# Patient Record
Sex: MALE | Race: BLACK OR AFRICAN AMERICAN | NOT HISPANIC OR LATINO | Employment: OTHER | URBAN - METROPOLITAN AREA
[De-identification: names, ages, dates, MRNs, and addresses within clinical notes are randomized per-mention and may not be internally consistent; named-entity substitution may affect disease eponyms.]

---

## 2017-01-24 DIAGNOSIS — E11.00 UNCONTROLLED TYPE 2 DIABETES MELLITUS WITH HYPEROSMOLARITY WITHOUT COMA, WITHOUT LONG-TERM CURRENT USE OF INSULIN: ICD-10-CM

## 2017-02-14 ENCOUNTER — TELEPHONE (OUTPATIENT)
Dept: WOUND CARE | Facility: CLINIC | Age: 67
End: 2017-02-14

## 2017-02-14 NOTE — TELEPHONE ENCOUNTER
----- Message from Silvia Melendrez sent at 2/14/2017 12:21 PM CST -----  Contact: self  Pt states he has blisters forming around ankle where left foot was amputated. Please call Sajivirgilio garcia @ [phone].Thank you.

## 2017-02-17 ENCOUNTER — OFFICE VISIT (OUTPATIENT)
Dept: WOUND CARE | Facility: CLINIC | Age: 67
End: 2017-02-17
Payer: MEDICARE

## 2017-02-17 VITALS
DIASTOLIC BLOOD PRESSURE: 63 MMHG | TEMPERATURE: 98 F | WEIGHT: 261.81 LBS | SYSTOLIC BLOOD PRESSURE: 142 MMHG | HEIGHT: 69 IN | HEART RATE: 80 BPM | BODY MASS INDEX: 38.78 KG/M2

## 2017-02-17 DIAGNOSIS — S91.302A WOUND, OPEN, FOOT WITH COMPLICATION, LEFT, INITIAL ENCOUNTER: ICD-10-CM

## 2017-02-17 DIAGNOSIS — E11.42 DIABETIC POLYNEUROPATHY ASSOCIATED WITH TYPE 2 DIABETES MELLITUS: ICD-10-CM

## 2017-02-17 DIAGNOSIS — B35.3 TINEA PEDIS OF BOTH FEET: ICD-10-CM

## 2017-02-17 DIAGNOSIS — L97.321 ULCER OF ANKLE, LEFT, LIMITED TO BREAKDOWN OF SKIN: ICD-10-CM

## 2017-02-17 PROBLEM — L97.309 ULCER OF ANKLE: Status: ACTIVE | Noted: 2017-02-17

## 2017-02-17 PROCEDURE — 4010F ACE/ARB THERAPY RXD/TAKEN: CPT | Mod: S$GLB,,, | Performed by: NURSE PRACTITIONER

## 2017-02-17 PROCEDURE — 1159F MED LIST DOCD IN RCRD: CPT | Mod: S$GLB,,, | Performed by: NURSE PRACTITIONER

## 2017-02-17 PROCEDURE — 3078F DIAST BP <80 MM HG: CPT | Mod: S$GLB,,, | Performed by: NURSE PRACTITIONER

## 2017-02-17 PROCEDURE — 99999 PR PBB SHADOW E&M-EST. PATIENT-LVL V: CPT | Mod: PBBFAC,,, | Performed by: NURSE PRACTITIONER

## 2017-02-17 PROCEDURE — 2022F DILAT RTA XM EVC RTNOPTHY: CPT | Mod: S$GLB,,, | Performed by: NURSE PRACTITIONER

## 2017-02-17 PROCEDURE — 99212 OFFICE O/P EST SF 10 MIN: CPT | Mod: 25,S$GLB,, | Performed by: NURSE PRACTITIONER

## 2017-02-17 PROCEDURE — 1125F AMNT PAIN NOTED PAIN PRSNT: CPT | Mod: S$GLB,,, | Performed by: NURSE PRACTITIONER

## 2017-02-17 PROCEDURE — 3044F HG A1C LEVEL LT 7.0%: CPT | Mod: S$GLB,,, | Performed by: NURSE PRACTITIONER

## 2017-02-17 PROCEDURE — 1160F RVW MEDS BY RX/DR IN RCRD: CPT | Mod: S$GLB,,, | Performed by: NURSE PRACTITIONER

## 2017-02-17 PROCEDURE — 11042 DBRDMT SUBQ TIS 1ST 20SQCM/<: CPT | Mod: LT,S$GLB,, | Performed by: NURSE PRACTITIONER

## 2017-02-17 PROCEDURE — 3077F SYST BP >= 140 MM HG: CPT | Mod: S$GLB,,, | Performed by: NURSE PRACTITIONER

## 2017-02-17 PROCEDURE — 1157F ADVNC CARE PLAN IN RCRD: CPT | Mod: S$GLB,,, | Performed by: NURSE PRACTITIONER

## 2017-02-17 NOTE — PATIENT INSTRUCTIONS
"Cleanse wounds with wound .  Triamcinolone cream to left lower leg twice daily.  Econazole cream to feet daily.  Apply hydrofera blue ready to left foot and ankle wounds, cover with gauze and secure with roll gauze.  Compression with one 4" and one 6" ace wrap left lower leg.  "

## 2017-02-17 NOTE — MR AVS SNAPSHOT
[facility]  [address]  [facility] [address]  Phone: [phone]                  Saji Castañeda   2017 10:20 AM   Office Visit    Description:  Male : 1950   Provider:  Priya Uribe NP   Department:  [facility]           Reason for Visit     Wound Check           Diagnoses this Visit        Comments    Uncontrolled type 2 diabetes mellitus with complication, with long-term current use of insulin    -  Primary     Wound, open, foot with complication, left, initial encounter         Ulcer of ankle, left, limited to breakdown of skin         Diabetic polyneuropathy associated with type 2 diabetes mellitus         Tinea pedis of both feet                To Do List           Goals (5 Years of Data)     None      Follow-Up and Disposition     Return in about 2 weeks (around 3/3/2017) for Wound evaluation.      [facility] On Call     [facility] On Call Nurse Care Line -  Assistance  Registered nurses in the [facility] On Call Center provide clinical advisement, health education, appointment booking, and other advisory services.  Call for this free service at [phone].             Medications           Message regarding Medications     Verify the changes and/or additions to your medication regime listed below are the same as discussed with your clinician today.  If any of these changes or additions are incorrect, please notify your healthcare provider.             Verify that the below list of medications is an accurate representation of the medications you are currently taking.  If none reported, the list may be blank. If incorrect, please contact your healthcare provider. Carry this list with you in case of emergency.           Current Medications     ammonium lactate (LAC-HYDRIN) 12 % lotion     aspirin (ECOTRIN) 81 MG EC tablet Take 81 mg by mouth once daily.    atorvastatin (LIPITOR) 20 MG tablet TK 1 T PO QD    atorvastatin (LIPITOR) 40 MG tablet Take 40 mg by  "mouth every evening.     BESIVANCE 0.6 % DrpS INSTILL 1 DROP INTO THE OS TID    blood sugar diagnostic (CONTOUR TEST STRIPS) Strp Inject 1 strip into the skin 2 (two) times daily before meals.    blood sugar diagnostic Strp Accu-Chek Test Strips to be tested 2 times daily    DUREZOL 0.05 % Drop ophthalmic solution INSTILL 1 DROP INTO THE OS QID    econazole nitrate 1 % cream APPLY EXTERNALLY TO THE AFFECTED AREA ONCE DAILY    ILEVRO 0.3 % DrpS INSTILL 1 DROP INTO THE OS QD    insulin detemir (LEVEMIR FLEXTOUCH) 100 unit/mL (3 mL) SubQ InPn pen Inject 25 Units into the skin 2 (two) times daily.    insulin detemir (LEVEMIR FLEXTOUCH) 100 unit/mL (3 mL) SubQ InPn pen Inject 25 units into the skin twice daily    lisinopril 10 MG tablet Take 10 mg by mouth once daily.     losartan (COZAAR) 50 MG tablet     metformin (GLUCOPHAGE) 500 MG tablet Take 2 tablets (1,000 mg total) by mouth 2 (two) times daily with meals.    MICROLET LANCET Misc     pen needle, diabetic (BD ULTRA-FINE ADITYA PEN NEEDLES) 32 gauge x 5/32" Ndle 4 Act by Misc.(Non-Drug; Combo Route) route 4 (four) times daily with meals and nightly.    sitagliptin (JANUVIA) 100 MG Tab Take 1 tablet (100 mg total) by mouth once daily.    tamsulosin (FLOMAX) 0.4 mg Cp24 Take 0.4 mg by mouth once daily.    triamcinolone acetonide 0.1% (KENALOG) 0.1 % cream APPLY EXTERNALLY TO THE AFFECTED AREA TWICE DAILY AS DIRECTED           Clinical Reference Information           Your Vitals Were     Height Weight BMI          5' 9" (1.753 m) 118.8 kg (261 lb 12.8 oz) 38.66 kg/m2        Allergies as of 2/17/2017     No Known Allergies      Immunizations Administered on Date of Encounter - 2/17/2017     None      [facility] Sign-Up     Activating your [facility] account is as easy as 1-2-3!     1) Visit [email or web address], select Sign Up Now, enter this activation code and your date of birth, then select Next.  [email or web address]  Expires: 4/3/2017 10:56 AM      2) Create a username and " "password to use when you visit [facility] in the future and select a security question in case you lose your password and select Next.    3) Enter your e-mail address and click Sign Up!    Additional Information  If you have questions, please e-mail [email or web address] or call [phone] to talk to our [facility] staff. Remember, [facility] is NOT to be used for urgent needs. For medical emergencies, dial 911.         Instructions    Cleanse wounds with wound .  Triamcinolone cream to left lower leg twice daily.  Econazole cream to feet daily.  Apply hydrofera blue ready to left foot and ankle wounds, cover with gauze and secure with roll gauze.  Compression with one 4" and one 6" ace wrap left lower leg.       Language Assistance Services     ATTENTION: Language assistance services are available, free of charge. Please call [phone].      ATENCIÓN: Si scott iniguez, tiene a glover disposición servicios gratuitos de asistencia lingüística. Llame al [phone].     VAL Ý: N?u b?n nói Ti?ng Vi?t, có các d?ch v? h? tr? ngôn ng? mi?n phí dành cho b?n. G?i s? [phone].         [facility] complies with applicable Federal civil rights laws and does not discriminate on the basis of race, color, national origin, age, disability, or sex.        "

## 2017-02-17 NOTE — PROGRESS NOTES
Subjective:       Patient ID: Saji Castañeda is a 67 y.o. male.    Chief Complaint: Wound Check    Wound Check     This patient is well-known to my service.  He is seen today with complaint of blisters to the medial and lateral aspect of the left lower leg.  This was first noticed on 2/8/17.  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  He was given orthotic shoes to wear because of the anatomy of the left leg.  However, he was wearing a pair of tennis shoes instead because he said they were more comfortable.  Because they were ill-fitting, they rubbed the medial and lateral aspect of the left ankle causing blisters.  In addition he has not been using his leg and foot creams routinely as prescribed.  As a result he has a thick callous over the previously healed surgical site that now appears wet. He is afebrile.  He denies increased redness, swelling or purulent drainage.  His pain level is 8/10.  His wound healing has been complicated by his type II diabetes which has been poorly controlled and by leg edema.  He has been keeping the wounds clean and dry but not using anything topically.       Review of Systems   Constitutional: Negative for chills, diaphoresis and fever.   HENT: Negative for hearing loss, postnasal drip, rhinorrhea, sinus pressure, sneezing, sore throat, tinnitus and trouble swallowing.    Eyes: Positive for visual disturbance (blurred vision).   Respiratory: Negative for cough, shortness of breath and wheezing.    Cardiovascular: Positive for leg swelling. Negative for chest pain and palpitations.   Gastrointestinal: Negative for constipation, diarrhea, nausea and vomiting.   Genitourinary: Negative for difficulty urinating, dysuria and hematuria.   Musculoskeletal: Positive for joint swelling (knees). Negative for arthralgias, back pain and myalgias.   Skin: Negative for wound.   Neurological: Negative for dizziness, light-headedness and headaches.   Hematological:  Does not bruise/bleed easily.   Psychiatric/Behavioral: Negative for decreased concentration and sleep disturbance. The patient is not nervous/anxious.        Objective:      Physical Exam   Constitutional: He is oriented to person, place, and time. He appears well-developed and well-nourished. No distress.   HENT:   Head: Normocephalic and atraumatic.   Neck: Normal range of motion.   Pulmonary/Chest: Effort normal.   Musculoskeletal: Normal range of motion. He exhibits edema. He exhibits no tenderness.        Feet:    Neurological: He is alert and oriented to person, place, and time.   Skin: Skin is warm, dry and intact. No rash noted. He is not diaphoretic. No cyanosis or erythema. Nails show no clubbing.   Psychiatric: He has a normal mood and affect. His behavior is normal. Judgment and thought content normal.   Nursing note and vitals reviewed.        Hemoglobin A1C   Date Value Ref Range Status   10/25/2016 6.9 (H) 4.5 - 6.2 % Final     Comment:     According to ADA guidelines, hemoglobin A1C <7.0% represents  optimal control in non-pregnant diabetic patients.  Different  metrics may apply to specific populations.   Standards of Medical Care in Diabetes - 2016.  For the purpose of screening for the presence of diabetes:  <5.7%     Consistent with the absence of diabetes  5.7-6.4%  Consistent with increasing risk for diabetes   (prediabetes)  >or=6.5%  Consistent with diabetes  Currently no consensus exists for use of hemoglobin A1C  for diagnosis of diabetes for children.     07/14/2016 7.8 (H) 4.5 - 6.2 % Final     Comment:     According to ADA guidelines, hemoglobin A1C <7.0% represents  optimal control in non-pregnant diabetic patients.  Different  metrics may apply to specific populations.   Standards of Medical Care in Diabetes - 2016.  For the purpose of screening for the presence of diabetes:  <5.7%     Consistent with the absence of diabetes  5.7-6.4%  Consistent with increasing risk for diabetes  "  (prediabetes)  >or=6.5%  Consistent with diabetes  Currently no consensus exists for use of hemoglobin A1C  for diagnosis of diabetes for children.     04/06/2016 10.5 (H) 4.5 - 6.2 % Final     Procedure: Saji was seen in the clinic room and placed in the supine position on the treatment table. Attention was directed to the wound which was located on the left foot, where an wound measuring 1.7x2.1 cm is noted. The wound was covered with 100% fibrin and a thick callused rim. No acute signs of infection were noted. The wound was non-tender. The wound was prepped with alcohol. Utilizing a #15 scalpel and pickups, I debrided the wound full-thickness through skin and subcutaneous tissue to excise viable and nonviable tissue inside and outside the wound margins. The patient tolerated well. Because of a lack of sensation, anesthesia was not necessary. The wound was flushed with wound . There was minimal bleeding with debridement which was well controlled with direct pressure. The wound was then dressed with hydrofera blue ready. The patient tolerated the procedure well.  Assessment:       1. Uncontrolled type 2 diabetes mellitus with complication, with long-term current use of insulin    2. Wound, open, foot with complication, left, initial encounter    3. Ulcer of ankle, left, limited to breakdown of skin    4. Diabetic polyneuropathy associated with type 2 diabetes mellitus    5. Tinea pedis of both feet        Plan:           Cleanse wounds with wound .  Triamcinolone cream to left lower leg twice daily.  Econazole cream to feet daily.  Apply hydrofera blue ready to left foot and ankle wounds, cover with gauze and secure with roll gauze.  Compression with one 4" and one 6" ace wrap left lower leg.  Return to this clinic in 2 weeks.  Because this patient is homebound, he would qualify for home health care assistance and has had this in the past.  He will contact his PCP to see about ordering " this.    Left foot       Left lateral ankle      Left medial ankle

## 2017-02-24 RX ORDER — INSULIN DETEMIR 100 [IU]/ML
INJECTION, SOLUTION SUBCUTANEOUS
Qty: 15 ML | Refills: 4 | Status: SHIPPED | OUTPATIENT
Start: 2017-02-24 | End: 2017-07-15 | Stop reason: SDUPTHER

## 2017-03-09 ENCOUNTER — TELEPHONE (OUTPATIENT)
Dept: WOUND CARE | Facility: CLINIC | Age: 67
End: 2017-03-09

## 2017-03-09 ENCOUNTER — OFFICE VISIT (OUTPATIENT)
Dept: WOUND CARE | Facility: CLINIC | Age: 67
End: 2017-03-09
Payer: MEDICARE

## 2017-03-09 VITALS
DIASTOLIC BLOOD PRESSURE: 73 MMHG | HEIGHT: 69 IN | WEIGHT: 262.38 LBS | SYSTOLIC BLOOD PRESSURE: 130 MMHG | BODY MASS INDEX: 38.86 KG/M2 | TEMPERATURE: 99 F | HEART RATE: 74 BPM

## 2017-03-09 DIAGNOSIS — L97.321 ULCER OF ANKLE, LEFT, LIMITED TO BREAKDOWN OF SKIN: ICD-10-CM

## 2017-03-09 DIAGNOSIS — B35.3 TINEA PEDIS OF BOTH FEET: ICD-10-CM

## 2017-03-09 DIAGNOSIS — R60.0 BILATERAL EDEMA OF LOWER EXTREMITY: Chronic | ICD-10-CM

## 2017-03-09 DIAGNOSIS — S91.302A WOUND, OPEN, FOOT WITH COMPLICATION, LEFT, INITIAL ENCOUNTER: ICD-10-CM

## 2017-03-09 PROCEDURE — 99211 OFF/OP EST MAY X REQ PHY/QHP: CPT | Mod: S$GLB,,, | Performed by: NURSE PRACTITIONER

## 2017-03-09 PROCEDURE — 99999 PR PBB SHADOW E&M-EST. PATIENT-LVL V: CPT | Mod: PBBFAC,,, | Performed by: NURSE PRACTITIONER

## 2017-03-09 NOTE — MR AVS SNAPSHOT
[facility]  [address]  [facility] [address]  Phone: [phone]                  Saji Castañeda   3/9/2017 11:40 AM   Office Visit    Description:  Male : 1950   Provider:  Priya Uribe NP   Department:  [facility]           Reason for Visit     Wound Check           Diagnoses this Visit        Comments    Uncontrolled type 2 diabetes mellitus with complication, with long-term current use of insulin    -  Primary     Ulcer of ankle, left, limited to breakdown of skin         Wound, open, foot with complication, left, initial encounter         Tinea pedis of both feet         Bilateral edema of lower extremity                To Do List           Future Appointments        Provider Department Dept Phone    3/9/2017 11:40 AM SHAYNE Cm [facility] [phone]      Goals (5 Years of Data)     None      Follow-Up and Disposition     Return if symptoms worsen or fail to improve.      [facility] On Call     [facility] On Call Nurse Care Line -  Assistance  Registered nurses in the [facility] On Call Center provide clinical advisement, health education, appointment booking, and other advisory services.  Call for this free service at [phone].             Medications           Message regarding Medications     Verify the changes and/or additions to your medication regime listed below are the same as discussed with your clinician today.  If any of these changes or additions are incorrect, please notify your healthcare provider.             Verify that the below list of medications is an accurate representation of the medications you are currently taking.  If none reported, the list may be blank. If incorrect, please contact your healthcare provider. Carry this list with you in case of emergency.           Current Medications     ammonium lactate (LAC-HYDRIN) 12 % lotion     aspirin (ECOTRIN) 81 MG EC tablet Take 81 mg by mouth once daily.    atorvastatin  "(LIPITOR) 20 MG tablet TK 1 T PO QD    atorvastatin (LIPITOR) 40 MG tablet Take 40 mg by mouth every evening.     BESIVANCE 0.6 % DrpS INSTILL 1 DROP INTO THE OS TID    blood sugar diagnostic (CONTOUR TEST STRIPS) Strp Inject 1 strip into the skin 2 (two) times daily before meals.    blood sugar diagnostic Strp Accu-Chek Test Strips to be tested 2 times daily    DUREZOL 0.05 % Drop ophthalmic solution INSTILL 1 DROP INTO THE OS QID    econazole nitrate 1 % cream APPLY EXTERNALLY TO THE AFFECTED AREA ONCE DAILY    ILEVRO 0.3 % DrpS INSTILL 1 DROP INTO THE OS QD    insulin detemir (LEVEMIR FLEXTOUCH) 100 unit/mL (3 mL) SubQ InPn pen Inject 25 Units into the skin 2 (two) times daily.    LEVEMIR FLEXTOUCH 100 unit/mL (3 mL) InPn pen INJECT 25 UNITS SUBCUTANEOUS TWICE DAILY    lisinopril 10 MG tablet Take 10 mg by mouth once daily.     losartan (COZAAR) 50 MG tablet     metformin (GLUCOPHAGE) 500 MG tablet Take 2 tablets (1,000 mg total) by mouth 2 (two) times daily with meals.    MICROLET LANCET Misc     pen needle, diabetic (BD ULTRA-FINE ADITYA PEN NEEDLES) 32 gauge x 5/32" Ndle 4 Act by Misc.(Non-Drug; Combo Route) route 4 (four) times daily with meals and nightly.    sitagliptin (JANUVIA) 100 MG Tab Take 1 tablet (100 mg total) by mouth once daily.    tamsulosin (FLOMAX) 0.4 mg Cp24 Take 0.4 mg by mouth once daily.    triamcinolone acetonide 0.1% (KENALOG) 0.1 % cream APPLY EXTERNALLY TO THE AFFECTED AREA TWICE DAILY AS DIRECTED           Clinical Reference Information           Your Vitals Were     Height Weight BMI          5' 9" (1.753 m) 119 kg (262 lb 6.4 oz) 38.75 kg/m2        Allergies as of 3/9/2017     No Known Allergies      Immunizations Administered on Date of Encounter - 3/9/2017     None      [facility] Sign-Up     Activating your [facility] account is as easy as 1-2-3!     1) Visit [email or web address], select Sign Up Now, enter this activation code and your date of birth, then select " "Next.  [email or web address]  Expires: 4/3/2017 10:56 AM      2) Create a username and password to use when you visit [facility] in the future and select a security question in case you lose your password and select Next.    3) Enter your e-mail address and click Sign Up!    Additional Information  If you have questions, please e-mail [email or web address] or call [phone] to talk to our [facility] staff. Remember, [facility] is NOT to be used for urgent needs. For medical emergencies, dial 911.         Instructions    Spectazole cream to feet daily.  Triamcinolone cream to lower leg twice daily.  Compression with one 4" and one 6" ace wrap left lower leg.    Go to [employer] or [employer] to get new footwear.       Language Assistance Services     ATTENTION: Language assistance services are available, free of charge. Please call [phone].      ATENCIÓN: Si scott iniguez, tiene a glover disposición servicios gratuitos de asistencia lingüística. Llame al [phone].     VAL Ý: N?u b?n nói Ti?ng Vi?t, có các d?ch v? h? tr? ngôn ng? mi?n phí dành cho b?n. G?i s? [phone].         [facility] complies with applicable Federal civil rights laws and does not discriminate on the basis of race, color, national origin, age, disability, or sex.        "

## 2017-03-09 NOTE — PATIENT INSTRUCTIONS
"Spectazole cream to feet daily.  Triamcinolone cream to lower leg twice daily.  Compression with one 4" and one 6" ace wrap left lower leg.    Go to [facility] or [employer] to get new footwear.  "

## 2017-03-09 NOTE — TELEPHONE ENCOUNTER
----- Message from Vickie Garg LPN sent at 3/9/2017  2:18 PM CST -----      ----- Message -----     From: Dimitri Marx     Sent: 3/9/2017   2:15 PM       To: [facility] Staff    Patient would like to give the office the number for the home health nurse that comes to his home;Pt doesn't remember her name but the # [phone]//please call back at [phone]//thank you

## 2017-03-09 NOTE — PROGRESS NOTES
Subjective:       Patient ID: Saji Castañeda is a 67 y.o. male.    Chief Complaint: Wound Check    Wound Check     This patient is seen today for reevaluation of blisters to the medial and lateral aspect of the left lower leg.  This was first noticed on 2/8/17.  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  He was given orthotic shoes to wear because of the anatomy of the left leg.  However, he was wearing a pair of tennis shoes instead because he said they were more comfortable.  Because they were ill-fitting, they rubbed the medial and lateral aspect of the left ankle causing blisters.  Hydrofera blue is being used on the wounds and they are now healed. He is afebrile.  He denies increased redness, swelling or purulent drainage.  He does not have any pain.  His medical history is significant for type II diabetes which has been poorly controlled and by leg edema.         Review of Systems   Constitutional: Negative for chills, diaphoresis and fever.   HENT: Negative for hearing loss, postnasal drip, rhinorrhea, sinus pressure, sneezing, sore throat, tinnitus and trouble swallowing.    Eyes: Positive for visual disturbance (blurred vision).   Respiratory: Negative for cough, shortness of breath and wheezing.    Cardiovascular: Positive for leg swelling. Negative for chest pain and palpitations.   Gastrointestinal: Negative for constipation, diarrhea, nausea and vomiting.   Genitourinary: Negative for difficulty urinating, dysuria and hematuria.   Musculoskeletal: Positive for joint swelling (knees). Negative for arthralgias, back pain and myalgias.   Skin: Negative for wound.   Neurological: Negative for dizziness, light-headedness and headaches.   Hematological: Does not bruise/bleed easily.   Psychiatric/Behavioral: Negative for decreased concentration and sleep disturbance. The patient is not nervous/anxious.        Objective:      Physical Exam   Constitutional: He is oriented to  person, place, and time. He appears well-developed and well-nourished. No distress.   HENT:   Head: Normocephalic and atraumatic.   Neck: Normal range of motion.   Pulmonary/Chest: Effort normal.   Musculoskeletal: Normal range of motion. He exhibits edema. He exhibits no tenderness.        Feet:    Neurological: He is alert and oriented to person, place, and time.   Skin: Skin is warm, dry and intact. No rash noted. He is not diaphoretic. No cyanosis or erythema. Nails show no clubbing.   Psychiatric: He has a normal mood and affect. His behavior is normal. Judgment and thought content normal.   Nursing note and vitals reviewed.        Hemoglobin A1C   Date Value Ref Range Status   10/25/2016 6.9 (H) 4.5 - 6.2 % Final     Comment:     According to ADA guidelines, hemoglobin A1C <7.0% represents  optimal control in non-pregnant diabetic patients.  Different  metrics may apply to specific populations.   Standards of Medical Care in Diabetes - 2016.  For the purpose of screening for the presence of diabetes:  <5.7%     Consistent with the absence of diabetes  5.7-6.4%  Consistent with increasing risk for diabetes   (prediabetes)  >or=6.5%  Consistent with diabetes  Currently no consensus exists for use of hemoglobin A1C  for diagnosis of diabetes for children.     07/14/2016 7.8 (H) 4.5 - 6.2 % Final     Comment:     According to ADA guidelines, hemoglobin A1C <7.0% represents  optimal control in non-pregnant diabetic patients.  Different  metrics may apply to specific populations.   Standards of Medical Care in Diabetes - 2016.  For the purpose of screening for the presence of diabetes:  <5.7%     Consistent with the absence of diabetes  5.7-6.4%  Consistent with increasing risk for diabetes   (prediabetes)  >or=6.5%  Consistent with diabetes  Currently no consensus exists for use of hemoglobin A1C  for diagnosis of diabetes for children.     04/06/2016 10.5 (H) 4.5 - 6.2 % Final     Procedure: Saji was seen in the  "clinic room and placed in the supine position on the treatment table. Attention was directed to the wound which was located on the left foot, where an wound measuring 1.7x2.1 cm is noted. The wound was covered with 100% fibrin and a thick callused rim. No acute signs of infection were noted. The wound was non-tender. The wound was prepped with alcohol. Utilizing a #15 scalpel and pickups, I debrided the wound full-thickness through skin and subcutaneous tissue to excise viable and nonviable tissue inside and outside the wound margins. The patient tolerated well. Because of a lack of sensation, anesthesia was not necessary. The wound was flushed with wound . There was minimal bleeding with debridement which was well controlled with direct pressure. The wound was then dressed with hydrofera blue ready. The patient tolerated the procedure well.  Assessment:       1. Uncontrolled type 2 diabetes mellitus with complication, with long-term current use of insulin    2. Ulcer of ankle, left, limited to breakdown of skin    3. Wound, open, foot with complication, left, initial encounter    4. Tinea pedis of both feet    5. Bilateral edema of lower extremity        Plan:           Spectazole cream to feet daily.  Triamcinolone cream to lower leg twice daily.  Compression with one 4" and one 6" ace wrap left lower leg.  Return to this clinic as needed.      Left foot       Left lateral ankle      Left medial ankle                                                                                                                                                                                                                          "

## 2017-03-28 RX ORDER — SITAGLIPTIN 100 MG/1
TABLET, FILM COATED ORAL
Qty: 90 TABLET | Refills: 1 | Status: ON HOLD | OUTPATIENT
Start: 2017-03-28 | End: 2020-12-14

## 2017-04-13 RX ORDER — PEN NEEDLE, DIABETIC 31 GX5/16"
NEEDLE, DISPOSABLE MISCELLANEOUS
Qty: 100 EACH | Refills: 11 | Status: SHIPPED | OUTPATIENT
Start: 2017-04-13 | End: 2018-05-03 | Stop reason: SDUPTHER

## 2017-06-13 ENCOUNTER — ANESTHESIA (OUTPATIENT)
Dept: SURGERY | Facility: OTHER | Age: 67
End: 2017-06-13
Payer: MEDICARE

## 2017-06-13 ENCOUNTER — ANESTHESIA EVENT (OUTPATIENT)
Dept: SURGERY | Facility: OTHER | Age: 67
End: 2017-06-13
Payer: MEDICARE

## 2017-06-13 ENCOUNTER — SURGERY (OUTPATIENT)
Age: 67
End: 2017-06-13

## 2017-06-13 ENCOUNTER — HOSPITAL ENCOUNTER (OUTPATIENT)
Facility: OTHER | Age: 67
Discharge: HOME | End: 2017-06-13
Attending: OPHTHALMOLOGY | Admitting: OPHTHALMOLOGY
Payer: MEDICARE

## 2017-06-13 VITALS
HEART RATE: 60 BPM | OXYGEN SATURATION: 98 % | DIASTOLIC BLOOD PRESSURE: 66 MMHG | TEMPERATURE: 99 F | RESPIRATION RATE: 16 BRPM | SYSTOLIC BLOOD PRESSURE: 146 MMHG

## 2017-06-13 DIAGNOSIS — H25.019 CATARACT CORTICAL, SENILE, UNSPECIFIED LATERALITY: Primary | ICD-10-CM

## 2017-06-13 DIAGNOSIS — H25.012 CATARACT CORTICAL, SENILE, LEFT: ICD-10-CM

## 2017-06-13 LAB
POCT GLUCOSE: 74 MG/DL (ref 70–110)
POCT GLUCOSE: 76 MG/DL (ref 70–110)

## 2017-06-13 PROCEDURE — 36000707: Performed by: OPHTHALMOLOGY

## 2017-06-13 PROCEDURE — 37000009 HC ANESTHESIA EA ADD 15 MINS: Performed by: OPHTHALMOLOGY

## 2017-06-13 PROCEDURE — 25000003 PHARM REV CODE 250: Performed by: SPECIALIST

## 2017-06-13 PROCEDURE — 63600175 PHARM REV CODE 636 W HCPCS: Performed by: NURSE ANESTHETIST, CERTIFIED REGISTERED

## 2017-06-13 PROCEDURE — 25000003 PHARM REV CODE 250: Performed by: OPHTHALMOLOGY

## 2017-06-13 PROCEDURE — 37000008 HC ANESTHESIA 1ST 15 MINUTES: Performed by: OPHTHALMOLOGY

## 2017-06-13 PROCEDURE — 71000015 HC POSTOP RECOV 1ST HR: Performed by: OPHTHALMOLOGY

## 2017-06-13 PROCEDURE — 71000016 HC POSTOP RECOV ADDL HR: Performed by: OPHTHALMOLOGY

## 2017-06-13 PROCEDURE — V2632 POST CHMBR INTRAOCULAR LENS: HCPCS | Performed by: OPHTHALMOLOGY

## 2017-06-13 PROCEDURE — 36000706: Performed by: OPHTHALMOLOGY

## 2017-06-13 PROCEDURE — 25000003 PHARM REV CODE 250: Performed by: NURSE ANESTHETIST, CERTIFIED REGISTERED

## 2017-06-13 DEVICE — LENS 19.0 ACRYSOF: Type: IMPLANTABLE DEVICE | Site: EYE | Status: FUNCTIONAL

## 2017-06-13 RX ORDER — TROPICAMIDE 10 MG/ML
1 SOLUTION/ DROPS OPHTHALMIC EVERY 5 MIN PRN
Status: COMPLETED | OUTPATIENT
Start: 2017-06-13 | End: 2017-06-13

## 2017-06-13 RX ORDER — TETRACAINE HYDROCHLORIDE 5 MG/ML
SOLUTION OPHTHALMIC
Status: DISCONTINUED | OUTPATIENT
Start: 2017-06-13 | End: 2017-06-13 | Stop reason: HOSPADM

## 2017-06-13 RX ORDER — MIDAZOLAM HYDROCHLORIDE 1 MG/ML
INJECTION INTRAMUSCULAR; INTRAVENOUS
Status: DISCONTINUED | OUTPATIENT
Start: 2017-06-13 | End: 2017-06-13

## 2017-06-13 RX ORDER — HYDRALAZINE HYDROCHLORIDE 20 MG/ML
INJECTION INTRAMUSCULAR; INTRAVENOUS
Status: DISCONTINUED | OUTPATIENT
Start: 2017-06-13 | End: 2017-06-13

## 2017-06-13 RX ORDER — FENTANYL CITRATE 50 UG/ML
INJECTION, SOLUTION INTRAMUSCULAR; INTRAVENOUS
Status: DISCONTINUED | OUTPATIENT
Start: 2017-06-13 | End: 2017-06-13

## 2017-06-13 RX ORDER — SODIUM CHLORIDE, SODIUM LACTATE, POTASSIUM CHLORIDE, CALCIUM CHLORIDE 600; 310; 30; 20 MG/100ML; MG/100ML; MG/100ML; MG/100ML
INJECTION, SOLUTION INTRAVENOUS CONTINUOUS PRN
Status: DISCONTINUED | OUTPATIENT
Start: 2017-06-13 | End: 2017-06-13

## 2017-06-13 RX ORDER — LIDOCAINE HYDROCHLORIDE 40 MG/ML
INJECTION, SOLUTION RETROBULBAR
Status: DISCONTINUED | OUTPATIENT
Start: 2017-06-13 | End: 2017-06-13 | Stop reason: HOSPADM

## 2017-06-13 RX ORDER — PHENYLEPHRINE HYDROCHLORIDE 25 MG/ML
1 SOLUTION/ DROPS OPHTHALMIC EVERY 5 MIN PRN
Status: COMPLETED | OUTPATIENT
Start: 2017-06-13 | End: 2017-06-13

## 2017-06-13 RX ORDER — DEXTROSE MONOHYDRATE 50 MG/ML
INJECTION, SOLUTION INTRAVENOUS CONTINUOUS PRN
Status: DISCONTINUED | OUTPATIENT
Start: 2017-06-13 | End: 2017-06-13

## 2017-06-13 RX ORDER — HYDROCODONE BITARTRATE AND ACETAMINOPHEN 5; 325 MG/1; MG/1
1 TABLET ORAL EVERY 4 HOURS PRN
Status: DISCONTINUED | OUTPATIENT
Start: 2017-06-13 | End: 2017-06-13 | Stop reason: HOSPADM

## 2017-06-13 RX ADMIN — HYDRALAZINE HYDROCHLORIDE 5 MG: 20 INJECTION INTRAMUSCULAR; INTRAVENOUS at 02:06

## 2017-06-13 RX ADMIN — MIDAZOLAM HYDROCHLORIDE 1 MG: 1 INJECTION, SOLUTION INTRAMUSCULAR; INTRAVENOUS at 02:06

## 2017-06-13 RX ADMIN — FENTANYL CITRATE 25 MCG: 50 INJECTION, SOLUTION INTRAMUSCULAR; INTRAVENOUS at 02:06

## 2017-06-13 RX ADMIN — TROPICAMIDE 1 DROP: 10 SOLUTION/ DROPS OPHTHALMIC at 01:06

## 2017-06-13 RX ADMIN — DEXTROSE: 5 SOLUTION INTRAVENOUS at 01:06

## 2017-06-13 RX ADMIN — PHENYLEPHRINE HYDROCHLORIDE 1 DROP: 25 SOLUTION/ DROPS OPHTHALMIC at 01:06

## 2017-06-13 RX ADMIN — LIDOCAINE HYDROCHLORIDE 3 DROP: 40 INJECTION, SOLUTION RETROBULBAR; TOPICAL at 01:06

## 2017-06-13 RX ADMIN — BALANCED SALT SOLUTION ENRICHED WITH BICARBONATE, DEXTROSE, AND GLUTATHIONE 15 ML: KIT at 01:06

## 2017-06-13 RX ADMIN — TETRACAINE HYDROCHLORIDE 1 DROP: 5 SOLUTION OPHTHALMIC at 01:06

## 2017-06-13 RX ADMIN — SODIUM CHLORIDE, SODIUM LACTATE, POTASSIUM CHLORIDE, AND CALCIUM CHLORIDE: 600; 310; 30; 20 INJECTION, SOLUTION INTRAVENOUS at 01:06

## 2017-06-13 NOTE — ANESTHESIA PREPROCEDURE EVALUATION
06/13/2017  Saji Castañeda is a 67 y.o., male.    Anesthesia Evaluation    I have reviewed the Patient Summary Reports.    I have reviewed the Nursing Notes.   I have reviewed the Medications.     Review of Systems  Anesthesia Hx:  No problems with previous Anesthesia    Social:  Non-Smoker, No Alcohol Use    Hematology/Oncology:  Hematology Normal   Oncology Normal     EENT/Dental:EENT/Dental Normal   Cardiovascular:   Exercise tolerance: poor Hypertension, well controlled PVD    Pulmonary:  Pulmonary Normal    Renal/:  Renal/ Normal     Hepatic/GI:  Hepatic/GI Normal    Musculoskeletal:  Musculoskeletal Normal    Neurological:  Neurology Normal    Endocrine:   Diabetes, poorly controlled, type 2, using insulin    Dermatological:  Skin Normal    Psych:  Psychiatric Normal           Physical Exam  General:  Well nourished, Obesity    Airway/Jaw/Neck:  Airway Findings: Mouth Opening: Normal Tongue: Normal  General Airway Assessment: Adult, Average  Mallampati: I  TM Distance: Normal, at least 6 cm  Jaw/Neck Findings:  Neck ROM: Normal ROM      Dental:  Dental Findings: Periodontal disease, Severe        Mental Status:  Mental Status Findings:  Cooperative, Alert and Oriented         Anesthesia Plan  Type of Anesthesia, risks & benefits discussed:  Anesthesia Type:  MAC  Patient's Preference:   Intra-op Monitoring Plan:   Intra-op Monitoring Plan Comments:   Post Op Pain Control Plan:   Post Op Pain Control Plan Comments:   Induction:    Beta Blocker:         Informed Consent:  Anesthesia consent signed with patient.  ASA Score: 3     Day of Surgery Review of History & Physical:    H&P update referred to the surgeon.         Ready For Surgery From Anesthesia Perspective.

## 2017-06-13 NOTE — INTERVAL H&P NOTE
The patient has been examined and the H&P has been reviewed:    I concur with the findings and no changes have occurred since H&P was written.    Anesthesia/Surgery risks, benefits and alternative options discussed and understood by patient/family.          Active Hospital Problems    Diagnosis  POA    Cataract cortical, senile [H25.019]  Yes      Resolved Hospital Problems    Diagnosis Date Resolved POA   No resolved problems to display.

## 2017-06-13 NOTE — OP NOTE
SURGEON:Epi Mccarty M.D.   PREOPERATIVE DIAGNOSIS:   Visually significant cataract.   POSTOPERATIVE DIAGNOSIS:   Visually significant cataract.   PROCEDURES:   Phacoemulsification with intraocular lens, left eye (75590)   ANESTHESIA: MAC with topical Lidocaine   COMPLICATIONS: None   ESTIMATED BLOOD LOSS: None   SPECIMENS: None   INDICATIONS:   The patient has a history of painless progressive visual loss and difficulty with activities of daily living secondary to cataract formation. After a thorough discussion of the risks, benefits, and alternatives to cataract surgery, including, but not limited to, the rare risks of infection, retinal detachment, hemorrhage, need for additional surgery, loss of vision, and even loss of the eye, the patient voices understanding and desires to proceed.   DESCRIPTION OF PROCEDURE:   The patients IOL calculations were reviewed, and the lens selection confirmed. After verification and marking of the proper eye in the preop holding area, the patient was brought to the operating room in supine position where the eye was prepped and draped in standard sterile fashion with 5% Betadine and a lid speculum placed in the eye. Topical 4% Lidocaine was used in addition to the preoperative anesthesia and the procedure was begun by the creation of a paracentesis incision through which viscoelastic was used to fill the anterior chamber. Next, a keratome blade was used to create a triplanar temporal clear corneal incision and a cystotome and Utrata forceps used to fashion a continuous curvilinear capsulorrhexis. Hydrodissection was carried out using the Kam hydrodissection cannula and the nucleus was found to be mobile. Phacoemulsification of the nucleus was carried out using a quick chop technique, and all remaining epinuclear and cortical material was removed. The eye was then reformed with Viscoelastic and the intraocular lens was implanted into the capsular bag. All remaining  viscoelastics were removed from the eye and at the end of the case the pupil was round, the lens was well-centered within the capsular bag and all wounds were found to be water tight. Drops of Vigamox and Pred Forte were instilled and a shield was placed over the eye. The patient will follow up with Dr. Mccarty in the morning.

## 2017-06-13 NOTE — BRIEF OP NOTE
"[facility]  Brief Operative Note     SUMMARY     Surgery Date: 6/13/2017     Surgeon(s) and Role:     * Epi Mccarty MD - Primary    Assisting Surgeon: None    Pre-op Diagnosis:  Cataract cortical, senile, left [H25.012]    Post-op Diagnosis:  Post-Op Diagnosis Codes:     * Cataract cortical, senile, left [H25.012]    Procedure(s) (LRB):  PHACOEMULSIFICATION-ASPIRATION-CATARACT (Left)    Anesthesia: Monitor Anesthesia Care    Description of the findings of the procedure: Cataract OS     Findings/Key Components: Cataract left eye    Estimated Blood Loss: * No values recorded between 6/13/2017  2:25 PM and 6/13/2017  2:47 PM *         Specimens:   Specimen (12h ago through future)    None          Discharge Note    SUMMARY     Admit Date: 6/13/2017    Discharge Date and Time:  06/13/2017 2:53 PM    Hospital Course (synopsis of major diagnoses, care, treatment, and services provided during the course of the hospital stay): Cataract surgery achieved     Final Diagnosis: Post-Op Diagnosis Codes:     * Cataract cortical, senile, left [H25.012]    Disposition: Home or Self Care    Follow Up/Patient Instructions:     Medications:  Reconciled Home Medications:   Current Discharge Medication List      CONTINUE these medications which have NOT CHANGED    Details   ammonium lactate (LAC-HYDRIN) 12 % lotion       !! atorvastatin (LIPITOR) 20 MG tablet TK 1 T PO QD  Refills: 1      !! atorvastatin (LIPITOR) 40 MG tablet Take 40 mg by mouth every evening.       BD ULTRA-FINE ADITYA PEN NEEDLES 32 gauge x 5/32" Ndle USE FOUR TIMES DAILY WITH MEALS AND NIGHTLY  Qty: 100 each, Refills: 11    Associated Diagnoses: Type II diabetes mellitus, uncontrolled      DUREZOL 0.05 % Drop ophthalmic solution INSTILL 1 DROP INTO THE OS QID  Refills: 2      econazole nitrate 1 % cream APPLY EXTERNALLY TO THE AFFECTED AREA ONCE DAILY  Qty: 85 g, Refills: 2      ILEVRO 0.3 % DrpS INSTILL 1 DROP INTO THE OS QD  Refills: 2    "   !! insulin detemir (LEVEMIR FLEXTOUCH) 100 unit/mL (3 mL) SubQ InPn pen Inject 25 Units into the skin 2 (two) times daily.  Qty: 1 Box, Refills: 6    Associated Diagnoses: Type II diabetes mellitus, uncontrolled      !! JANUVIA 100 mg Tab TAKE 1 TABLET BY MOUTH ONCE DAILY  Qty: 90 tablet, Refills: 1    Associated Diagnoses: Type 2 diabetes, uncontrolled, with neuropathy      losartan (COZAAR) 50 MG tablet       metformin (GLUCOPHAGE) 500 MG tablet Take 2 tablets (1,000 mg total) by mouth 2 (two) times daily with meals.  Qty: 360 tablet, Refills: 3    Comments: **Patient requests 90 days supply**      !! sitagliptin (JANUVIA) 100 MG Tab Take 1 tablet (100 mg total) by mouth once daily.  Qty: 90 tablet, Refills: 3    Comments: **Patient requests 90 days supply**  Associated Diagnoses: Type 2 diabetes, uncontrolled, with neuropathy      tamsulosin (FLOMAX) 0.4 mg Cp24 Take 0.4 mg by mouth once daily.      triamcinolone acetonide 0.1% (KENALOG) 0.1 % cream APPLY EXTERNALLY TO THE AFFECTED AREA TWICE DAILY AS DIRECTED  Qty: 454 g, Refills: 0    Associated Diagnoses: Dermatitis      aspirin (ECOTRIN) 81 MG EC tablet Take 81 mg by mouth once daily.      BESIVANCE 0.6 % DrpS INSTILL 1 DROP INTO THE OS TID  Refills: 2      !! blood sugar diagnostic (CONTOUR TEST STRIPS) Strp Inject 1 strip into the skin 2 (two) times daily before meals.  Qty: 100 strip, Refills: 6    Comments: To use with Contour Ascencia meter  Associated Diagnoses: Type II diabetes mellitus, uncontrolled      !! blood sugar diagnostic Strp Accu-Chek Test Strips to be tested 2 times daily  Qty: 100 strip, Refills: 6      !! LEVEMIR FLEXTOUCH 100 unit/mL (3 mL) InPn pen INJECT 25 UNITS SUBCUTANEOUS TWICE DAILY  Qty: 15 mL, Refills: 4    Associated Diagnoses: Type II diabetes mellitus, uncontrolled      MICROLET LANCET Misc Refills: 0       !! - Potential duplicate medications found. Please discuss with provider.          Discharge Procedure Orders  Diet  general     Activity as tolerated     Lifting restrictions   Order Comments: No heavy lifting anything over 10 pounds. No bending at the waist for the first night.       Follow-up Information     Epi Mccarty MD In 1 day.    Specialty:  Ophthalmology  Why:  Tomorrow 730am [place] office.  Bring all three eyedrops  Contact information:  [address] [address] [phone]

## 2017-06-13 NOTE — PLAN OF CARE
Patient prefers to have Kye Maite (friend) present for discharge teaching. Please contact them @[phone].

## 2017-06-13 NOTE — DISCHARGE INSTRUCTIONS
Ilevro, Besivance and Durezol qid OS today.      Anesthesia: Monitored Anesthesia Care (MAC)    Anesthesia Safety  · Have an adult family member or friend drive you home after the procedure.  · For the first 24 hours after your surgery:  ¨ Do not drive or use heavy equipment.  ¨ Do not make important decisions or sign documents.  ¨ Avoid alcohol.  ¨ Have someone stay with you, if possible. They can watch for problems and help keep you safe.

## 2017-06-13 NOTE — ANESTHESIA POSTPROCEDURE EVALUATION
Anesthesia Post Evaluation    Patient: Saji Castañeda    Procedure(s) Performed: Procedure(s) (LRB):  PHACOEMULSIFICATION-ASPIRATION-CATARACT (Left)    Final Anesthesia Type: MAC  Patient location during evaluation: [facility]  Patient participation: Yes- Able to Participate  Level of consciousness: awake and alert  Post-procedure vital signs: reviewed and stable  Pain management: adequate  Airway patency: patent  PONV status at discharge: No PONV  Anesthetic complications: no      Cardiovascular status: hypertensive and bradycardic  Respiratory status: unassisted, spontaneous ventilation and room air  Hydration status: euvolemic  Follow-up not needed.        Visit Vitals  /69 (BP Location: Right arm, Patient Position: Sitting, BP Method: Automatic)   Pulse 66   Temp 37.3 °C (99.1 °F) (Oral)   Resp 16   SpO2 97%       Pain/Wing Score: Pain Assessment Performed: Yes (6/13/2017  1:00 PM)  Presence of Pain: complains of pain/discomfort (6/13/2017  1:00 PM)

## 2017-07-15 RX ORDER — INSULIN DETEMIR 100 [IU]/ML
INJECTION, SOLUTION SUBCUTANEOUS
Qty: 1 BOX | Refills: 1 | Status: SHIPPED | OUTPATIENT
Start: 2017-07-15 | End: 2017-09-12 | Stop reason: SDUPTHER

## 2017-07-15 NOTE — TELEPHONE ENCOUNTER
Please call patient and have him schedule an appointment with TYRONE Rivas NP. He has not been seen since July 2016. I would send in rx for insulin with only 1 refill as he needs an appointment.

## 2017-09-05 ENCOUNTER — ANESTHESIA EVENT (OUTPATIENT)
Dept: SURGERY | Facility: OTHER | Age: 67
End: 2017-09-05
Payer: MEDICARE

## 2017-09-05 ENCOUNTER — ANESTHESIA (OUTPATIENT)
Dept: SURGERY | Facility: OTHER | Age: 67
End: 2017-09-05
Payer: MEDICARE

## 2017-09-05 ENCOUNTER — HOSPITAL ENCOUNTER (OUTPATIENT)
Facility: OTHER | Age: 67
Discharge: HOME | End: 2017-09-05
Attending: OPHTHALMOLOGY | Admitting: OPHTHALMOLOGY
Payer: MEDICARE

## 2017-09-05 VITALS
TEMPERATURE: 98 F | OXYGEN SATURATION: 97 % | DIASTOLIC BLOOD PRESSURE: 75 MMHG | BODY MASS INDEX: 38.95 KG/M2 | HEART RATE: 72 BPM | RESPIRATION RATE: 16 BRPM | SYSTOLIC BLOOD PRESSURE: 171 MMHG | WEIGHT: 263 LBS | HEIGHT: 69 IN

## 2017-09-05 DIAGNOSIS — H25.011 CORTICAL AGE-RELATED CATARACT OF RIGHT EYE: Primary | ICD-10-CM

## 2017-09-05 LAB — POCT GLUCOSE: 128 MG/DL (ref 70–110)

## 2017-09-05 PROCEDURE — 36000707: Performed by: OPHTHALMOLOGY

## 2017-09-05 PROCEDURE — 37000009 HC ANESTHESIA EA ADD 15 MINS: Performed by: OPHTHALMOLOGY

## 2017-09-05 PROCEDURE — 37000008 HC ANESTHESIA 1ST 15 MINUTES: Performed by: OPHTHALMOLOGY

## 2017-09-05 PROCEDURE — 82962 GLUCOSE BLOOD TEST: CPT | Performed by: OPHTHALMOLOGY

## 2017-09-05 PROCEDURE — 63600175 PHARM REV CODE 636 W HCPCS: Performed by: NURSE ANESTHETIST, CERTIFIED REGISTERED

## 2017-09-05 PROCEDURE — V2632 POST CHMBR INTRAOCULAR LENS: HCPCS | Performed by: OPHTHALMOLOGY

## 2017-09-05 PROCEDURE — 25000003 PHARM REV CODE 250: Performed by: OPHTHALMOLOGY

## 2017-09-05 PROCEDURE — 36000706: Performed by: OPHTHALMOLOGY

## 2017-09-05 PROCEDURE — 25000003 PHARM REV CODE 250: Performed by: ANESTHESIOLOGY

## 2017-09-05 PROCEDURE — 71000015 HC POSTOP RECOV 1ST HR: Performed by: OPHTHALMOLOGY

## 2017-09-05 DEVICE — LENS 20.0: Type: IMPLANTABLE DEVICE | Site: EYE | Status: FUNCTIONAL

## 2017-09-05 RX ORDER — PHENYLEPHRINE HYDROCHLORIDE 25 MG/ML
1 SOLUTION/ DROPS OPHTHALMIC EVERY 5 MIN PRN
Status: COMPLETED | OUTPATIENT
Start: 2017-09-05 | End: 2017-09-05

## 2017-09-05 RX ORDER — HYDROCODONE BITARTRATE AND ACETAMINOPHEN 5; 325 MG/1; MG/1
1 TABLET ORAL EVERY 4 HOURS PRN
Status: DISCONTINUED | OUTPATIENT
Start: 2017-09-05 | End: 2017-09-05 | Stop reason: HOSPADM

## 2017-09-05 RX ORDER — FENTANYL CITRATE 50 UG/ML
INJECTION, SOLUTION INTRAMUSCULAR; INTRAVENOUS
Status: DISCONTINUED | OUTPATIENT
Start: 2017-09-05 | End: 2017-09-05

## 2017-09-05 RX ORDER — FENTANYL CITRATE 50 UG/ML
25 INJECTION, SOLUTION INTRAMUSCULAR; INTRAVENOUS EVERY 5 MIN PRN
Status: DISCONTINUED | OUTPATIENT
Start: 2017-09-05 | End: 2017-09-05 | Stop reason: HOSPADM

## 2017-09-05 RX ORDER — TROPICAMIDE 10 MG/ML
1 SOLUTION/ DROPS OPHTHALMIC EVERY 5 MIN PRN
Status: COMPLETED | OUTPATIENT
Start: 2017-09-05 | End: 2017-09-05

## 2017-09-05 RX ORDER — HYDROMORPHONE HYDROCHLORIDE 2 MG/ML
0.4 INJECTION, SOLUTION INTRAMUSCULAR; INTRAVENOUS; SUBCUTANEOUS EVERY 5 MIN PRN
Status: DISCONTINUED | OUTPATIENT
Start: 2017-09-05 | End: 2017-09-05 | Stop reason: HOSPADM

## 2017-09-05 RX ORDER — LIDOCAINE HYDROCHLORIDE 40 MG/ML
INJECTION, SOLUTION RETROBULBAR
Status: DISCONTINUED | OUTPATIENT
Start: 2017-09-05 | End: 2017-09-05 | Stop reason: HOSPADM

## 2017-09-05 RX ORDER — SODIUM CHLORIDE, SODIUM LACTATE, POTASSIUM CHLORIDE, CALCIUM CHLORIDE 600; 310; 30; 20 MG/100ML; MG/100ML; MG/100ML; MG/100ML
INJECTION, SOLUTION INTRAVENOUS CONTINUOUS PRN
Status: DISCONTINUED | OUTPATIENT
Start: 2017-09-05 | End: 2017-09-05

## 2017-09-05 RX ORDER — HYDRALAZINE HYDROCHLORIDE 20 MG/ML
INJECTION INTRAMUSCULAR; INTRAVENOUS
Status: DISCONTINUED | OUTPATIENT
Start: 2017-09-05 | End: 2017-09-05

## 2017-09-05 RX ORDER — SODIUM CHLORIDE 0.9 % (FLUSH) 0.9 %
3 SYRINGE (ML) INJECTION
Status: DISCONTINUED | OUTPATIENT
Start: 2017-09-05 | End: 2017-09-05 | Stop reason: HOSPADM

## 2017-09-05 RX ORDER — TETRACAINE HYDROCHLORIDE 5 MG/ML
SOLUTION OPHTHALMIC
Status: DISCONTINUED | OUTPATIENT
Start: 2017-09-05 | End: 2017-09-05 | Stop reason: HOSPADM

## 2017-09-05 RX ORDER — MEPERIDINE HYDROCHLORIDE 50 MG/ML
12.5 INJECTION INTRAMUSCULAR; INTRAVENOUS; SUBCUTANEOUS ONCE AS NEEDED
Status: DISCONTINUED | OUTPATIENT
Start: 2017-09-05 | End: 2017-09-05 | Stop reason: HOSPADM

## 2017-09-05 RX ORDER — OXYCODONE HYDROCHLORIDE 5 MG/1
5 TABLET ORAL
Status: DISCONTINUED | OUTPATIENT
Start: 2017-09-05 | End: 2017-09-05 | Stop reason: HOSPADM

## 2017-09-05 RX ORDER — PREDNISOLONE ACETATE 10 MG/ML
SUSPENSION/ DROPS OPHTHALMIC
Status: DISCONTINUED | OUTPATIENT
Start: 2017-09-05 | End: 2017-09-05 | Stop reason: HOSPADM

## 2017-09-05 RX ORDER — MOXIFLOXACIN 5 MG/ML
SOLUTION/ DROPS OPHTHALMIC
Status: DISCONTINUED | OUTPATIENT
Start: 2017-09-05 | End: 2017-09-05 | Stop reason: HOSPADM

## 2017-09-05 RX ORDER — LIDOCAINE HYDROCHLORIDE 20 MG/ML
JELLY TOPICAL
Status: DISCONTINUED | OUTPATIENT
Start: 2017-09-05 | End: 2017-09-05 | Stop reason: HOSPADM

## 2017-09-05 RX ORDER — MIDAZOLAM HYDROCHLORIDE 1 MG/ML
INJECTION INTRAMUSCULAR; INTRAVENOUS
Status: DISCONTINUED | OUTPATIENT
Start: 2017-09-05 | End: 2017-09-05

## 2017-09-05 RX ADMIN — TROPICAMIDE 1 DROP: 10 SOLUTION/ DROPS OPHTHALMIC at 10:09

## 2017-09-05 RX ADMIN — FENTANYL CITRATE 100 MCG: 50 INJECTION, SOLUTION INTRAMUSCULAR; INTRAVENOUS at 12:09

## 2017-09-05 RX ADMIN — PHENYLEPHRINE HYDROCHLORIDE 1 DROP: 25 SOLUTION/ DROPS OPHTHALMIC at 10:09

## 2017-09-05 RX ADMIN — SODIUM CHLORIDE, SODIUM LACTATE, POTASSIUM CHLORIDE, AND CALCIUM CHLORIDE: 600; 310; 30; 20 INJECTION, SOLUTION INTRAVENOUS at 12:09

## 2017-09-05 RX ADMIN — HYDRALAZINE HYDROCHLORIDE 10 MG: 20 INJECTION INTRAMUSCULAR; INTRAVENOUS at 12:09

## 2017-09-05 RX ADMIN — MIDAZOLAM HYDROCHLORIDE 2 MG: 1 INJECTION, SOLUTION INTRAMUSCULAR; INTRAVENOUS at 12:09

## 2017-09-05 NOTE — ANESTHESIA PREPROCEDURE EVALUATION
09/05/2017  Saji Castañeda is a 67 y.o., male.    Anesthesia Evaluation    I have reviewed the Patient Summary Reports.    I have reviewed the Nursing Notes.   I have reviewed the Medications.     Review of Systems  Anesthesia Hx:  No problems with previous Anesthesia  Denies Family Hx of Anesthesia complications.   Denies Personal Hx of Anesthesia complications.   Social:  Non-Smoker    Hematology/Oncology:  Hematology Normal   Oncology Normal     Cardiovascular:   Hypertension    Pulmonary:  Pulmonary Normal    Renal/:  Renal/ Normal     Hepatic/GI:  Hepatic/GI Normal    Musculoskeletal:   Foot amputation   Neurological:   Peripheral Neuropathy    Endocrine:   Diabetes, well controlled, type 1        Physical Exam  General:  Well nourished    Airway/Jaw/Neck:  Airway Findings: Mouth Opening: Normal Tongue: Normal  General Airway Assessment: Adult  Mallampati: II  TM Distance: Normal, at least 6 cm         Dental:  Dental Findings: Periodontal disease, SevereOnly three upper teeth but carious             Anesthesia Plan  Type of Anesthesia, risks & benefits discussed:  Anesthesia Type:  MAC  Patient's Preference:   Intra-op Monitoring Plan: standard ASA monitors  Intra-op Monitoring Plan Comments:   Post Op Pain Control Plan:   Post Op Pain Control Plan Comments:   Induction:    Beta Blocker:         Informed Consent: Patient understands risks and agrees with Anesthesia plan.  Questions answered. Anesthesia consent signed with patient.  ASA Score: 3     Day of Surgery Review of History & Physical:    H&P update referred to the surgeon.         Ready For Surgery From Anesthesia Perspective.

## 2017-09-05 NOTE — BRIEF OP NOTE
[facility]  Brief Operative Note     SUMMARY     Surgery Date: 9/5/2017     Surgeon(s) and Role:     * Epi Mccarty MD - Primary    Assisting Surgeon: None    Pre-op Diagnosis:  Cataract cortical, senile, right [H25.011]    Post-op Diagnosis:  Post-Op Diagnosis Codes:     * Cataract cortical, senile, right [H25.011]    Procedure(s) (LRB):  PHACOEMULSIFICATION-ASPIRATION-CATARACT (Right)  INSERTION-INTRAOCULAR LENS (IOL) (Right)    Anesthesia: Monitor Anesthesia Care    Description of the findings of the procedure: Phaco with IOL right eye    Findings/Key Components: same    Estimated Blood Loss: * No values recorded between 9/5/2017 12:43 PM and 9/5/2017  1:05 PM *         Specimens:   Specimen (12h ago through future)    None          Discharge Note    SUMMARY     Admit Date: 9/5/2017    Discharge Date and Time:  09/05/2017 1:13 PM    Hospital Course (synopsis of major diagnoses, care, treatment, and services provided during the course of the hospital stay): Uncomplicated surgery     Final Diagnosis: Post-Op Diagnosis Codes:     * Cataract cortical, senile, right [H25.011]    Disposition: Home or Self Care    Follow Up/Patient Instructions:     Medications:  Reconciled Home Medications:   Current Discharge Medication List      CONTINUE these medications which have NOT CHANGED    Details   ammonium lactate (LAC-HYDRIN) 12 % lotion       aspirin (ECOTRIN) 81 MG EC tablet Take 81 mg by mouth once daily.      !! atorvastatin (LIPITOR) 20 MG tablet TK 1 T PO QD  Refills: 1      !! atorvastatin (LIPITOR) 40 MG tablet Take 40 mg by mouth every evening.       BESIVANCE 0.6 % DrpS INSTILL 1 DROP INTO THE OS TID  Refills: 2      DUREZOL 0.05 % Drop ophthalmic solution INSTILL 1 DROP INTO THE OS QID  Refills: 2      econazole nitrate 1 % cream APPLY EXTERNALLY TO THE AFFECTED AREA ONCE DAILY  Qty: 85 g, Refills: 2      ILEVRO 0.3 % DrpS INSTILL 1 DROP INTO THE OS QD  Refills: 2      !! JANUVIA 100 mg  "Tab TAKE 1 TABLET BY MOUTH ONCE DAILY  Qty: 90 tablet, Refills: 1    Associated Diagnoses: Type 2 diabetes, uncontrolled, with neuropathy      LEVEMIR FLEXTOUCH 100 unit/mL (3 mL) InPn pen INJECT 25 UNITS SUBCUTANEOUS TWICE DAILY  Qty: 1 Box, Refills: 1    Associated Diagnoses: Type II diabetes mellitus, uncontrolled      losartan (COZAAR) 50 MG tablet       metformin (GLUCOPHAGE) 500 MG tablet Take 2 tablets (1,000 mg total) by mouth 2 (two) times daily with meals.  Qty: 360 tablet, Refills: 3    Comments: **Patient requests 90 days supply**      !! sitagliptin (JANUVIA) 100 MG Tab Take 1 tablet (100 mg total) by mouth once daily.  Qty: 90 tablet, Refills: 3    Comments: **Patient requests 90 days supply**  Associated Diagnoses: Type 2 diabetes, uncontrolled, with neuropathy      tamsulosin (FLOMAX) 0.4 mg Cp24 Take 0.4 mg by mouth once daily.      triamcinolone acetonide 0.1% (KENALOG) 0.1 % cream APPLY EXTERNALLY TO THE AFFECTED AREA TWICE DAILY AS DIRECTED  Qty: 454 g, Refills: 0    Associated Diagnoses: Dermatitis      BD ULTRA-FINE ADITYA PEN NEEDLES 32 gauge x 5/32" Ndle USE FOUR TIMES DAILY WITH MEALS AND NIGHTLY  Qty: 100 each, Refills: 11    Associated Diagnoses: Type II diabetes mellitus, uncontrolled      !! blood sugar diagnostic (CONTOUR TEST STRIPS) Strp Inject 1 strip into the skin 2 (two) times daily before meals.  Qty: 100 strip, Refills: 6    Comments: To use with Contour Ascencia meter  Associated Diagnoses: Type II diabetes mellitus, uncontrolled      !! blood sugar diagnostic Strp Accu-Chek Test Strips to be tested 2 times daily  Qty: 100 strip, Refills: 6      MICROLET LANCET Misc Refills: 0       !! - Potential duplicate medications found. Please discuss with provider.          Discharge Procedure Orders  Diet general     Activity as tolerated     Lifting restrictions   Order Comments: No heavy lifting anything over 10 pounds. No bending at the waist for the first night.       Follow-up " Information     Epi Mccarty MD In 1 day.    Specialty:  Ophthalmology  Why:  745am. Bring all eyedrops tomorrow  Contact information:  [address] [facility]  [address]  [place] [id number] [phone]

## 2017-09-05 NOTE — PLAN OF CARE
Saji Castañeda has met all discharge criteria from Phase II. Vital Signs are stable, ambulating  without difficulty. Discharge instructions given, patient verbalized understanding. Discharged from facility via wheelchair in stable condition.

## 2017-09-05 NOTE — ANESTHESIA POSTPROCEDURE EVALUATION
"Anesthesia Post Evaluation    Patient: Saji Castañeda    Procedure(s) Performed: Procedure(s) (LRB):  PHACOEMULSIFICATION-ASPIRATION-CATARACT (Right)  INSERTION-INTRAOCULAR LENS (IOL) (Right)    Final Anesthesia Type: MAC  Patient location during evaluation: [facility]  Patient participation: Yes- Able to Participate  Level of consciousness: awake and alert  Post-procedure vital signs: reviewed and stable  Pain management: adequate  Airway patency: patent  PONV status at discharge: No PONV  Anesthetic complications: no      Cardiovascular status: stable  Respiratory status: unassisted, spontaneous ventilation and room air  Hydration status: euvolemic  Follow-up not needed.        Visit Vitals  BP (!) 154/82 (BP Location: Right arm, Patient Position: Sitting)   Pulse 68   Temp 37.2 °C (98.9 °F) (Oral)   Resp 16   Ht 5' 9" (1.753 m)   Wt 119.3 kg (263 lb)   SpO2 97%   BMI 38.84 kg/m²       Pain/Wing Score: Pain Assessment Performed: Yes (9/5/2017 10:24 AM)  Presence of Pain: denies (9/5/2017 10:24 AM)      "

## 2017-09-05 NOTE — OP NOTE
SURGEON:Epi Mccarty M.D.   PREOPERATIVE DIAGNOSIS:   Visually significant cataract.   POSTOPERATIVE DIAGNOSIS:   Visually significant cataract.   PROCEDURES:   Phacoemulsification with intraocular lens, Right eye (58476)   ANESTHESIA: MAC with topical Lidocaine   COMPLICATIONS: None   ESTIMATED BLOOD LOSS: None   SPECIMENS: None   INDICATIONS:   The patient has a history of painless progressive visual loss and difficulty with activities of daily living secondary to cataract formation. After a thorough discussion of the risks, benefits, and alternatives to cataract surgery, including, but not limited to, the rare risks of infection, retinal detachment, hemorrhage, need for additional surgery, loss of vision, and even loss of the eye, the patient voices understanding and desires to proceed.   DESCRIPTION OF PROCEDURE:   The patients IOL calculations were reviewed, and the lens selection confirmed. After verification and marking of the proper eye in the preop holding area, the patient was brought to the operating room in supine position where the eye was prepped and draped in standard sterile fashion with 5% Betadine and a lid speculum placed in the eye. Topical 4% Lidocaine was used in addition to the preoperative anesthesia and the procedure was begun by the creation of a paracentesis incision through which viscoelastic was used to fill the anterior chamber. Next, a keratome blade was used to create a triplanar temporal clear corneal incision and a cystotome and Utrata forceps used to fashion a continuous curvilinear capsulorrhexis. Hydrodissection was carried out using the Kam hydrodissection cannula and the nucleus was found to be mobile. Phacoemulsification of the nucleus was carried out using a quick chop technique, and all remaining epinuclear and cortical material was removed. The eye was then reformed with Viscoelastic and the intraocular lens was implanted into the capsular bag. All remaining  viscoelastics were removed from the eye and at the end of the case the pupil was round, the lens was well-centered within the capsular bag and all wounds were found to be water tight. Drops of Vigamox and Pred Forte were instilled and a shield was placed over the eye. The patient will follow up with Dr. Mccarty in the morning.

## 2017-09-05 NOTE — DISCHARGE INSTRUCTIONS
No bending, lifting, avoid sleeping on operative site     1) Clean shield on topical cases.   2) Keep eye shield on operative site until seen in clinic.  3) Instruct patient to keep operative eye patched or shielded at bedtime for 4 days        Follow any other instructions given per Dr Mccarty!!!      Anesthesia: Monitored Anesthesia Care (MAC)    Anesthesia Safety  · Have an adult family member or friend drive you home after the procedure.  · For the first 24 hours after your surgery:  ¨ Do not drive or use heavy equipment.  ¨ Do not make important decisions or sign documents.  ¨ Avoid alcohol.  ¨ Have someone stay with you, if possible. They can watch for problems and help keep you safe.        Home Care Instructions for Eye Surgeries    1. ACTIVITY:   Limit your activity today. Increase activity gradually. You may return to work or school as directed by your physician.    2. DIET:   Drink plenty of fluids. Resume your normal diet unless instructed otherwise.  3. PAIN:   Expect a moderate amount of pain. If a prescription for pain is not sent home with you, you may take your commonly used pain reliever as directed. If this is not sufficient, call your physician. You may resume any other prescription medication unless otherwise directed by your physician.     4. DRESSING:   Keep your dressing dry and intact.  5. COMMENTS:   No driving, operating heavy machinery or singing legal documents for 24 hours.    No bending forward at the waist.   See attached schedule of eye drops.    Discuss any problem with your physician as soon as it arises. Do not Delay.      EMERGENCY- If you are unable to contact your physician, please go to the nearest Emergency Room.

## 2017-09-05 NOTE — PLAN OF CARE
Patient prefers to have  Raghu marinew present for discharge teaching. Please contact them @ [phone].

## 2017-09-11 RX ORDER — INSULIN DETEMIR 100 [IU]/ML
INJECTION, SOLUTION SUBCUTANEOUS
Qty: 15 ML | Refills: 0 | OUTPATIENT
Start: 2017-09-11

## 2017-09-12 DIAGNOSIS — Z79.4 UNCONTROLLED TYPE 2 DIABETES MELLITUS WITH HYPEROSMOLAR COMA, WITH LONG-TERM CURRENT USE OF INSULIN: ICD-10-CM

## 2017-09-12 DIAGNOSIS — E11.01 UNCONTROLLED TYPE 2 DIABETES MELLITUS WITH HYPEROSMOLAR COMA, WITH LONG-TERM CURRENT USE OF INSULIN: ICD-10-CM

## 2017-09-12 NOTE — TELEPHONE ENCOUNTER
----- Message from Veena Coburn sent at 9/12/2017  2:04 PM CDT -----  Contact: [facility]  Patient needs a refill on Levemir 25 units 2 x's day.  Pharmacy # is [phone]

## 2017-10-05 NOTE — INTERVAL H&P NOTE
The patient has been examined and the H&P has been reviewed:    I concur with the findings and no changes have occurred since H&P was written.    Anesthesia/Surgery risks, benefits and alternative options discussed and understood by patient/family.          Active Hospital Problems    Diagnosis  POA    Cortical age-related cataract of right eye [H25.011]  Yes      Resolved Hospital Problems    Diagnosis Date Resolved POA   No resolved problems to display.

## 2017-11-13 RX ORDER — CALCIUM CITRATE/VITAMIN D3 200MG-6.25
TABLET ORAL
Qty: 100 STRIP | Refills: 0 | Status: CANCELLED | OUTPATIENT
Start: 2017-11-13

## 2018-02-17 DIAGNOSIS — E11.01 UNCONTROLLED TYPE 2 DIABETES MELLITUS WITH HYPEROSMOLAR COMA, WITH LONG-TERM CURRENT USE OF INSULIN: ICD-10-CM

## 2018-02-17 DIAGNOSIS — Z79.4 UNCONTROLLED TYPE 2 DIABETES MELLITUS WITH HYPEROSMOLAR COMA, WITH LONG-TERM CURRENT USE OF INSULIN: ICD-10-CM

## 2018-05-03 RX ORDER — PEN NEEDLE, DIABETIC 31 GX5/16"
NEEDLE, DISPOSABLE MISCELLANEOUS
Qty: 100 EACH | Refills: 6 | Status: SHIPPED | OUTPATIENT
Start: 2018-05-03 | End: 2019-04-21 | Stop reason: SDUPTHER

## 2018-06-10 DIAGNOSIS — Z79.4 UNCONTROLLED TYPE 2 DIABETES MELLITUS WITH HYPEROSMOLAR COMA, WITH LONG-TERM CURRENT USE OF INSULIN: ICD-10-CM

## 2018-06-10 DIAGNOSIS — E11.01 UNCONTROLLED TYPE 2 DIABETES MELLITUS WITH HYPEROSMOLAR COMA, WITH LONG-TERM CURRENT USE OF INSULIN: ICD-10-CM

## 2018-10-01 ENCOUNTER — HOSPITAL ENCOUNTER (EMERGENCY)
Facility: HOSPITAL | Age: 68
Discharge: HOME | End: 2018-10-01
Attending: EMERGENCY MEDICINE
Payer: MEDICARE

## 2018-10-01 VITALS
DIASTOLIC BLOOD PRESSURE: 60 MMHG | BODY MASS INDEX: 38.51 KG/M2 | WEIGHT: 260 LBS | TEMPERATURE: 100 F | HEART RATE: 76 BPM | RESPIRATION RATE: 18 BRPM | HEIGHT: 69 IN | OXYGEN SATURATION: 94 % | SYSTOLIC BLOOD PRESSURE: 123 MMHG

## 2018-10-01 DIAGNOSIS — N39.0 URINARY TRACT INFECTION WITHOUT HEMATURIA, SITE UNSPECIFIED: Primary | ICD-10-CM

## 2018-10-01 LAB
ALBUMIN SERPL BCP-MCNC: 2.7 G/DL
ALP SERPL-CCNC: 104 U/L
ALT SERPL W/O P-5'-P-CCNC: 49 U/L
ANION GAP SERPL CALC-SCNC: 11 MMOL/L
AST SERPL-CCNC: 52 U/L
B-OH-BUTYR BLD STRIP-SCNC: 0.7 MMOL/L
BASOPHILS # BLD AUTO: 0.02 K/UL
BASOPHILS NFR BLD: 0.2 %
BILIRUB SERPL-MCNC: 0.7 MG/DL
BUN SERPL-MCNC: 13 MG/DL
CALCIUM SERPL-MCNC: 9.2 MG/DL
CHLORIDE SERPL-SCNC: 96 MMOL/L
CO2 SERPL-SCNC: 27 MMOL/L
CREAT SERPL-MCNC: 1.3 MG/DL
DIFFERENTIAL METHOD: ABNORMAL
EOSINOPHIL # BLD AUTO: 0 K/UL
EOSINOPHIL NFR BLD: 0 %
ERYTHROCYTE [DISTWIDTH] IN BLOOD BY AUTOMATED COUNT: 15 %
EST. GFR  (AFRICAN AMERICAN): >60 ML/MIN/1.73 M^2
EST. GFR  (NON AFRICAN AMERICAN): 55.7 ML/MIN/1.73 M^2
GLUCOSE SERPL-MCNC: 168 MG/DL
HCT VFR BLD AUTO: 36.7 %
HGB BLD-MCNC: 12 G/DL
IMM GRANULOCYTES # BLD AUTO: 0.06 K/UL
IMM GRANULOCYTES NFR BLD AUTO: 0.6 %
LACTATE SERPL-SCNC: 1.7 MMOL/L
LYMPHOCYTES # BLD AUTO: 0.9 K/UL
LYMPHOCYTES NFR BLD: 8.8 %
MCH RBC QN AUTO: 28.6 PG
MCHC RBC AUTO-ENTMCNC: 32.7 G/DL
MCV RBC AUTO: 87 FL
MONOCYTES # BLD AUTO: 0.5 K/UL
MONOCYTES NFR BLD: 4.7 %
NEUTROPHILS # BLD AUTO: 9 K/UL
NEUTROPHILS NFR BLD: 85.7 %
NRBC BLD-RTO: 0 /100 WBC
PLATELET # BLD AUTO: 355 K/UL
PMV BLD AUTO: 9.8 FL
POTASSIUM SERPL-SCNC: 4.4 MMOL/L
PROT SERPL-MCNC: 8 G/DL
RBC # BLD AUTO: 4.2 M/UL
SODIUM SERPL-SCNC: 134 MMOL/L
WBC # BLD AUTO: 10.48 K/UL

## 2018-10-01 PROCEDURE — 80053 COMPREHEN METABOLIC PANEL: CPT

## 2018-10-01 PROCEDURE — 87040 BLOOD CULTURE FOR BACTERIA: CPT

## 2018-10-01 PROCEDURE — 96361 HYDRATE IV INFUSION ADD-ON: CPT

## 2018-10-01 PROCEDURE — 96365 THER/PROPH/DIAG IV INF INIT: CPT

## 2018-10-01 PROCEDURE — 99284 EMERGENCY DEPT VISIT MOD MDM: CPT | Mod: ,,, | Performed by: EMERGENCY MEDICINE

## 2018-10-01 PROCEDURE — 63600175 PHARM REV CODE 636 W HCPCS: Performed by: EMERGENCY MEDICINE

## 2018-10-01 PROCEDURE — 82010 KETONE BODYS QUAN: CPT

## 2018-10-01 PROCEDURE — 83605 ASSAY OF LACTIC ACID: CPT

## 2018-10-01 PROCEDURE — 85025 COMPLETE CBC W/AUTO DIFF WBC: CPT

## 2018-10-01 PROCEDURE — 99284 EMERGENCY DEPT VISIT MOD MDM: CPT | Mod: 25

## 2018-10-01 PROCEDURE — 25000003 PHARM REV CODE 250: Performed by: EMERGENCY MEDICINE

## 2018-10-01 RX ORDER — ACETAMINOPHEN 500 MG
1000 TABLET ORAL
Status: COMPLETED | OUTPATIENT
Start: 2018-10-01 | End: 2018-10-01

## 2018-10-01 RX ORDER — LEVOFLOXACIN 500 MG/1
500 TABLET, FILM COATED ORAL DAILY
Qty: 7 TABLET | Refills: 0 | Status: SHIPPED | OUTPATIENT
Start: 2018-10-01 | End: 2018-10-06

## 2018-10-01 RX ORDER — TRAMADOL HYDROCHLORIDE 50 MG/1
50 TABLET ORAL EVERY 6 HOURS PRN
Qty: 12 TABLET | Refills: 0 | Status: SHIPPED | OUTPATIENT
Start: 2018-10-01 | End: 2018-10-11

## 2018-10-01 RX ORDER — IBUPROFEN 600 MG/1
600 TABLET ORAL EVERY 6 HOURS PRN
Qty: 20 TABLET | Refills: 0 | Status: ON HOLD | OUTPATIENT
Start: 2018-10-01 | End: 2018-10-16 | Stop reason: HOSPADM

## 2018-10-01 RX ADMIN — CEFTRIAXONE SODIUM 2 G: 2 INJECTION, POWDER, FOR SOLUTION INTRAMUSCULAR; INTRAVENOUS at 09:10

## 2018-10-01 RX ADMIN — SODIUM CHLORIDE 1000 ML: 0.9 INJECTION, SOLUTION INTRAVENOUS at 09:10

## 2018-10-01 RX ADMIN — ACETAMINOPHEN 1000 MG: 500 TABLET ORAL at 09:10

## 2018-10-02 NOTE — ED TRIAGE NOTES
Pt started taking sulfamethoxazole-TMP about two weeks ago for UTI. Pt reports abd pain and diarrhea since starting medication. Pt also reports increase urination.

## 2018-10-02 NOTE — ED PROVIDER NOTES
"Encounter Date: 10/1/2018    SCRIBE #1 NOTE: I, Sarai Muñoz, am scribing for, and in the presence of,  Dr. Claros. I have scribed the entire note.       History     Chief Complaint   Patient presents with    Dysuria     Currently taking Bactrin for "Infection in the urine." Also reports abd pain.      Time patient was seen by the provider: 8:21 PM      The patient is a 68 y.o. male with co-morbidities including: T2DM and HLD, who presents to the ED with a complaint of dysuria x1 week. He was started on bactrim for UTI, but continues to have symptoms, and has developed a fever. He endorses suprapubic discomfort.       The history is provided by the patient.     Review of patient's allergies indicates:  No Known Allergies  Past Medical History:   Diagnosis Date    Arthritis     legs    Diabetes mellitus     Diabetes mellitus, type 2     Hyperlipidemia     Osteomyelitis      Past Surgical History:   Procedure Laterality Date    ANGIOGRAM-EXTREMITY-LOWER Left 5/29/2014    Performed by [facility] at [facility] 2ND FLR    APPLICATION, GRAFT, SKIN, SPLIT-THICKNESS Left 2/19/2014    Performed by [facility]aga [facility] at [facility] OR 2ND FLR    APPLICATION, GRAFT, SKIN, SPLIT-THICKNESS Left 1/15/2013    Performed by David Gandhi MD at [facility] OR 2ND FLR    DEBRIDEMENT-WOUND Left 5/29/2014    Performed by [facility]aga [facility] at [facility] OR 2ND FLR    FOOT AMPUTATION  October 2010    left high midfoot amputation    INSERTION-INTRAOCULAR LENS (IOL) Right 9/5/2017    Performed by Epi [facility]arty [facility] at [facility] OR    PHACOEMULSIFICATION-ASPIRATION-CATARACT Right 9/5/2017    Performed by Epi [facility] at [facility] OR    PHACOEMULSIFICATION-ASPIRATION-CATARACT Left 6/13/2017    Performed by Epi [facility]ccarty [facility] at [facility] OR     Family History   Problem Relation Age of Onset    Diabetes Mother     Heart disease Mother     Stroke Sister     Cancer Brother     Diabetes Sister      Social History     Tobacco Use    " Smoking status: Never Smoker    Smokeless tobacco: Never Used   Substance Use Topics    Alcohol use: No     Comment: occassional    Drug use: No     Review of Systems   Constitutional: Positive for fever.   HENT: Negative for sore throat.    Eyes: Negative for visual disturbance.   Respiratory: Negative for shortness of breath.    Cardiovascular: Negative for chest pain.   Gastrointestinal: Positive for abdominal pain (suprapubic).   Genitourinary: Positive for dysuria.   Musculoskeletal: Negative for back pain.   Skin: Negative for rash.   Neurological: Negative for weakness.       Physical Exam     Initial Vitals [10/01/18 1954]   BP Pulse Resp Temp SpO2   130/60 70 16 (!) 100.9 °F (38.3 °C) 99 %      MAP       --         Physical Exam    Nursing note and vitals reviewed.  Constitutional: He appears well-developed and well-nourished. He is not diaphoretic. No distress.   HENT:   Head: Normocephalic and atraumatic.   Eyes: Conjunctivae and EOM are normal. Pupils are equal, round, and reactive to light.   Neck: Normal range of motion. Neck supple. No JVD present.   Cardiovascular: Normal rate, regular rhythm and normal heart sounds.   No murmur heard.  Pulmonary/Chest: Breath sounds normal. No respiratory distress. He has no wheezes. He has no rhonchi. He has no rales.   Abdominal: Soft. Bowel sounds are normal. He exhibits no distension and no mass. There is no rebound and no guarding.   No focal tenderness to palpation.    Musculoskeletal: Normal range of motion. He exhibits no edema or tenderness.   Left sided below the knee amputation. Venous stasis changes to right lower extremity.    Neurological: He is alert and oriented to person, place, and time. He has normal strength. No cranial nerve deficit or sensory deficit.   Skin: Skin is warm and dry. No rash noted.   Psychiatric: He has a normal mood and affect.         ED Course   Procedures  Labs Reviewed   CBC W/ AUTO DIFFERENTIAL - Abnormal; Notable for the  following components:       Result Value    RBC 4.20 (*)     Hemoglobin 12.0 (*)     Hematocrit 36.7 (*)     RDW 15.0 (*)     Platelets 355 (*)     Immature Granulocytes 0.6 (*)     Gran # (ANC) 9.0 (*)     Immature Grans (Abs) 0.06 (*)     Lymph # 0.9 (*)     Gran% 85.7 (*)     Lymph% 8.8 (*)     All other components within normal limits   COMPREHENSIVE METABOLIC PANEL - Abnormal; Notable for the following components:    Sodium 134 (*)     Glucose 168 (*)     Albumin 2.7 (*)     AST 52 (*)     ALT 49 (*)     eGFR if non  55.7 (*)     All other components within normal limits   BETA - HYDROXYBUTYRATE, SERUM - Abnormal; Notable for the following components:    Beta-Hydroxybutyrate 0.7 (*)     All other components within normal limits   CULTURE, BLOOD   CULTURE, BLOOD   LACTIC ACID, PLASMA   URINALYSIS, REFLEX TO URINE CULTURE          Imaging Results    None          Medical Decision Making:   History:   Old Medical Records: I decided to obtain old medical records.  Clinical Tests:   Lab Tests: Reviewed and Ordered  ED Management:  Patient's vitals improved with fever control, patient's lab work was all grossly within normal limits. This point in time the patient has no vital signs that are consistent with SIRS criteria, because of abnormal blood cell count, normal lactic acid, the patient will be simply started on a more appropriate p.o. antibiotics.  He was given instructions to follow up closely with his primary care physician for further assessment evaluation, and to return to the ER for any acute negative symptoms. The patient verbalized understanding of this medical plan and agreed            Scribe Attestation:   Scribe #1: I performed the above scribed service and the documentation accurately describes the services I performed. I attest to the accuracy of the note.               Clinical Impression:   The encounter diagnosis was Urinary tract infection without hematuria, site  unspecified.                             Ugo Claros MD  10/01/18 8754

## 2018-10-06 LAB
BACTERIA BLD CULT: NORMAL
BACTERIA BLD CULT: NORMAL

## 2018-10-15 ENCOUNTER — HOSPITAL ENCOUNTER (OUTPATIENT)
Facility: HOSPITAL | Age: 68
Discharge: HOME | End: 2018-10-16
Attending: EMERGENCY MEDICINE | Admitting: EMERGENCY MEDICINE
Payer: MEDICARE

## 2018-10-15 DIAGNOSIS — N17.9 AKI (ACUTE KIDNEY INJURY): Primary | ICD-10-CM

## 2018-10-15 DIAGNOSIS — R06.02 SOBOE (SHORTNESS OF BREATH ON EXERTION): ICD-10-CM

## 2018-10-15 DIAGNOSIS — R07.9 CHEST PAIN: ICD-10-CM

## 2018-10-15 DIAGNOSIS — N18.30 CHRONIC KIDNEY DISEASE, STAGE III (MODERATE): ICD-10-CM

## 2018-10-15 DIAGNOSIS — R79.89 ELEVATED TROPONIN: ICD-10-CM

## 2018-10-15 DIAGNOSIS — R07.89 ATYPICAL CHEST PAIN: ICD-10-CM

## 2018-10-15 DIAGNOSIS — R07.89 CHEST DISCOMFORT: ICD-10-CM

## 2018-10-15 PROBLEM — D64.9 ANEMIA: Status: ACTIVE | Noted: 2018-10-15

## 2018-10-15 LAB
ALBUMIN SERPL BCP-MCNC: 2.2 G/DL
ALP SERPL-CCNC: 73 U/L
ALT SERPL W/O P-5'-P-CCNC: 76 U/L
ANION GAP SERPL CALC-SCNC: 7 MMOL/L
AST SERPL-CCNC: 76 U/L
BACTERIA #/AREA URNS AUTO: NORMAL /HPF
BASOPHILS # BLD AUTO: 0.06 K/UL
BASOPHILS NFR BLD: 0.6 %
BILIRUB SERPL-MCNC: 0.3 MG/DL
BILIRUB UR QL STRIP: NEGATIVE
BNP SERPL-MCNC: 292 PG/ML
BUN SERPL-MCNC: 29 MG/DL
CALCIUM SERPL-MCNC: 9.7 MG/DL
CHLORIDE SERPL-SCNC: 102 MMOL/L
CLARITY UR REFRACT.AUTO: CLEAR
CO2 SERPL-SCNC: 26 MMOL/L
COLOR UR AUTO: YELLOW
CREAT SERPL-MCNC: 2 MG/DL
D DIMER PPP IA.FEU-MCNC: 2.47 MG/L FEU
DIFFERENTIAL METHOD: ABNORMAL
EOSINOPHIL # BLD AUTO: 0.1 K/UL
EOSINOPHIL NFR BLD: 0.7 %
ERYTHROCYTE [DISTWIDTH] IN BLOOD BY AUTOMATED COUNT: 15.6 %
EST. GFR  (AFRICAN AMERICAN): 38.2 ML/MIN/1.73 M^2
EST. GFR  (NON AFRICAN AMERICAN): 33.1 ML/MIN/1.73 M^2
FOLATE SERPL-MCNC: 3.6 NG/ML
GLUCOSE SERPL-MCNC: 109 MG/DL
GLUCOSE UR QL STRIP: NEGATIVE
HCT VFR BLD AUTO: 33.2 %
HGB BLD-MCNC: 10.3 G/DL
HGB UR QL STRIP: NEGATIVE
HYALINE CASTS UR QL AUTO: 0 /LPF
IMM GRANULOCYTES # BLD AUTO: 0.04 K/UL
IMM GRANULOCYTES NFR BLD AUTO: 0.4 %
IRON SERPL-MCNC: 47 UG/DL
KETONES UR QL STRIP: NEGATIVE
LEUKOCYTE ESTERASE UR QL STRIP: NEGATIVE
LYMPHOCYTES # BLD AUTO: 0.8 K/UL
LYMPHOCYTES NFR BLD: 8.7 %
MCH RBC QN AUTO: 27.2 PG
MCHC RBC AUTO-ENTMCNC: 31 G/DL
MCV RBC AUTO: 88 FL
MICROSCOPIC COMMENT: NORMAL
MONOCYTES # BLD AUTO: 1 K/UL
MONOCYTES NFR BLD: 10.1 %
NEUTROPHILS # BLD AUTO: 7.6 K/UL
NEUTROPHILS NFR BLD: 79.5 %
NITRITE UR QL STRIP: NEGATIVE
NRBC BLD-RTO: 0 /100 WBC
PH UR STRIP: 7 [PH] (ref 5–8)
PLATELET # BLD AUTO: 352 K/UL
PMV BLD AUTO: 8.7 FL
POCT GLUCOSE: 75 MG/DL (ref 70–110)
POTASSIUM SERPL-SCNC: 4.1 MMOL/L
PROT SERPL-MCNC: 7.4 G/DL
PROT UR QL STRIP: ABNORMAL
RBC # BLD AUTO: 3.78 M/UL
RBC #/AREA URNS AUTO: 0 /HPF (ref 0–4)
SATURATED IRON: 29 %
SODIUM SERPL-SCNC: 135 MMOL/L
SP GR UR STRIP: 1.01 (ref 1–1.03)
SQUAMOUS #/AREA URNS AUTO: 0 /HPF
TOTAL IRON BINDING CAPACITY: 163 UG/DL
TRANSFERRIN SERPL-MCNC: 110 MG/DL
TROPONIN I SERPL DL<=0.01 NG/ML-MCNC: 0.05 NG/ML
TROPONIN I SERPL DL<=0.01 NG/ML-MCNC: 0.05 NG/ML
TROPONIN I SERPL DL<=0.01 NG/ML-MCNC: 0.07 NG/ML
TSH SERPL DL<=0.005 MIU/L-ACNC: 2 UIU/ML
URN SPEC COLLECT METH UR: ABNORMAL
UROBILINOGEN UR STRIP-ACNC: NEGATIVE EU/DL
WBC # BLD AUTO: 9.52 K/UL
WBC #/AREA URNS AUTO: 4 /HPF (ref 0–5)

## 2018-10-15 PROCEDURE — 80053 COMPREHEN METABOLIC PANEL: CPT

## 2018-10-15 PROCEDURE — 99220 PR INITIAL OBSERVATION CARE,LEVL III: CPT | Mod: ,,, | Performed by: PHYSICIAN ASSISTANT

## 2018-10-15 PROCEDURE — 63600175 PHARM REV CODE 636 W HCPCS: Performed by: PHYSICIAN ASSISTANT

## 2018-10-15 PROCEDURE — 99285 EMERGENCY DEPT VISIT HI MDM: CPT | Mod: ,,, | Performed by: PHYSICIAN ASSISTANT

## 2018-10-15 PROCEDURE — 83036 HEMOGLOBIN GLYCOSYLATED A1C: CPT

## 2018-10-15 PROCEDURE — 82962 GLUCOSE BLOOD TEST: CPT

## 2018-10-15 PROCEDURE — 83540 ASSAY OF IRON: CPT

## 2018-10-15 PROCEDURE — 83880 ASSAY OF NATRIURETIC PEPTIDE: CPT

## 2018-10-15 PROCEDURE — 85379 FIBRIN DEGRADATION QUANT: CPT

## 2018-10-15 PROCEDURE — 85025 COMPLETE CBC W/AUTO DIFF WBC: CPT

## 2018-10-15 PROCEDURE — 99285 EMERGENCY DEPT VISIT HI MDM: CPT | Mod: 25

## 2018-10-15 PROCEDURE — 96360 HYDRATION IV INFUSION INIT: CPT | Mod: 59

## 2018-10-15 PROCEDURE — 25000003 PHARM REV CODE 250: Performed by: PHYSICIAN ASSISTANT

## 2018-10-15 PROCEDURE — 36415 COLL VENOUS BLD VENIPUNCTURE: CPT

## 2018-10-15 PROCEDURE — 81001 URINALYSIS AUTO W/SCOPE: CPT

## 2018-10-15 PROCEDURE — 84484 ASSAY OF TROPONIN QUANT: CPT | Mod: 91

## 2018-10-15 PROCEDURE — 25500020 PHARM REV CODE 255: Performed by: EMERGENCY MEDICINE

## 2018-10-15 PROCEDURE — 84443 ASSAY THYROID STIM HORMONE: CPT

## 2018-10-15 PROCEDURE — G0378 HOSPITAL OBSERVATION PER HR: HCPCS

## 2018-10-15 PROCEDURE — 84484 ASSAY OF TROPONIN QUANT: CPT

## 2018-10-15 PROCEDURE — 82746 ASSAY OF FOLIC ACID SERUM: CPT

## 2018-10-15 RX ORDER — LOSARTAN POTASSIUM 50 MG/1
50 TABLET ORAL DAILY
Status: DISCONTINUED | OUTPATIENT
Start: 2018-10-16 | End: 2018-10-16

## 2018-10-15 RX ORDER — IBUPROFEN 200 MG
24 TABLET ORAL
Status: DISCONTINUED | OUTPATIENT
Start: 2018-10-15 | End: 2018-10-16 | Stop reason: HOSPADM

## 2018-10-15 RX ORDER — ONDANSETRON 2 MG/ML
4 INJECTION INTRAMUSCULAR; INTRAVENOUS EVERY 8 HOURS PRN
Status: DISCONTINUED | OUTPATIENT
Start: 2018-10-15 | End: 2018-10-16 | Stop reason: HOSPADM

## 2018-10-15 RX ORDER — HEPARIN SODIUM 5000 [USP'U]/ML
5000 INJECTION, SOLUTION INTRAVENOUS; SUBCUTANEOUS EVERY 8 HOURS
Status: DISCONTINUED | OUTPATIENT
Start: 2018-10-15 | End: 2018-10-16 | Stop reason: HOSPADM

## 2018-10-15 RX ORDER — AMOXICILLIN 250 MG
1 CAPSULE ORAL 2 TIMES DAILY
Status: DISCONTINUED | OUTPATIENT
Start: 2018-10-15 | End: 2018-10-16 | Stop reason: HOSPADM

## 2018-10-15 RX ORDER — ASPIRIN 81 MG/1
81 TABLET ORAL DAILY
Status: DISCONTINUED | OUTPATIENT
Start: 2018-10-16 | End: 2018-10-16 | Stop reason: HOSPADM

## 2018-10-15 RX ORDER — LIDOCAINE 50 MG/G
1 PATCH TOPICAL
Status: DISCONTINUED | OUTPATIENT
Start: 2018-10-16 | End: 2018-10-16 | Stop reason: HOSPADM

## 2018-10-15 RX ORDER — GLUCAGON 1 MG
1 KIT INJECTION
Status: DISCONTINUED | OUTPATIENT
Start: 2018-10-15 | End: 2018-10-16 | Stop reason: HOSPADM

## 2018-10-15 RX ORDER — ATORVASTATIN CALCIUM 20 MG/1
40 TABLET, FILM COATED ORAL NIGHTLY
Status: DISCONTINUED | OUTPATIENT
Start: 2018-10-15 | End: 2018-10-16 | Stop reason: HOSPADM

## 2018-10-15 RX ORDER — ONDANSETRON 8 MG/1
8 TABLET, ORALLY DISINTEGRATING ORAL EVERY 8 HOURS PRN
Status: DISCONTINUED | OUTPATIENT
Start: 2018-10-15 | End: 2018-10-16 | Stop reason: HOSPADM

## 2018-10-15 RX ORDER — POLYETHYLENE GLYCOL 3350 17 G/17G
17 POWDER, FOR SOLUTION ORAL DAILY
Status: DISCONTINUED | OUTPATIENT
Start: 2018-10-16 | End: 2018-10-16 | Stop reason: HOSPADM

## 2018-10-15 RX ORDER — RAMELTEON 8 MG/1
8 TABLET ORAL NIGHTLY PRN
Status: DISCONTINUED | OUTPATIENT
Start: 2018-10-15 | End: 2018-10-16 | Stop reason: HOSPADM

## 2018-10-15 RX ORDER — ACETAMINOPHEN 325 MG/1
650 TABLET ORAL EVERY 8 HOURS PRN
Status: DISCONTINUED | OUTPATIENT
Start: 2018-10-15 | End: 2018-10-16 | Stop reason: HOSPADM

## 2018-10-15 RX ORDER — INSULIN ASPART 100 [IU]/ML
6 INJECTION, SOLUTION INTRAVENOUS; SUBCUTANEOUS
Status: DISCONTINUED | OUTPATIENT
Start: 2018-10-16 | End: 2018-10-16 | Stop reason: HOSPADM

## 2018-10-15 RX ORDER — TAMSULOSIN HYDROCHLORIDE 0.4 MG/1
0.4 CAPSULE ORAL DAILY
Status: DISCONTINUED | OUTPATIENT
Start: 2018-10-16 | End: 2018-10-16 | Stop reason: HOSPADM

## 2018-10-15 RX ORDER — SODIUM CHLORIDE 0.9 % (FLUSH) 0.9 %
5 SYRINGE (ML) INJECTION
Status: DISCONTINUED | OUTPATIENT
Start: 2018-10-15 | End: 2018-10-16 | Stop reason: HOSPADM

## 2018-10-15 RX ORDER — IBUPROFEN 200 MG
16 TABLET ORAL
Status: DISCONTINUED | OUTPATIENT
Start: 2018-10-15 | End: 2018-10-16 | Stop reason: HOSPADM

## 2018-10-15 RX ADMIN — INSULIN DETEMIR 22 UNITS: 100 INJECTION, SOLUTION SUBCUTANEOUS at 10:10

## 2018-10-15 RX ADMIN — SODIUM CHLORIDE 250 ML: 0.9 INJECTION, SOLUTION INTRAVENOUS at 12:10

## 2018-10-15 RX ADMIN — SODIUM CHLORIDE 1000 ML: 0.9 INJECTION, SOLUTION INTRAVENOUS at 02:10

## 2018-10-15 RX ADMIN — HEPARIN SODIUM 5000 UNITS: 5000 INJECTION, SOLUTION INTRAVENOUS; SUBCUTANEOUS at 10:10

## 2018-10-15 RX ADMIN — IOHEXOL 100 ML: 350 INJECTION, SOLUTION INTRAVENOUS at 02:10

## 2018-10-15 NOTE — ED TRIAGE NOTES
+ Fever (seen by gen care dr told has ??pneumonia last week on antibiotics bladder infection, fever)

## 2018-10-15 NOTE — SUBJECTIVE & OBJECTIVE
Past Medical History:   Diagnosis Date    Arthritis     legs    Diabetes mellitus     Diabetes mellitus, type 2     Hyperlipidemia     Osteomyelitis        Past Surgical History:   Procedure Laterality Date    ANGIOGRAM-EXTREMITY-LOWER Left 5/29/2014    Performed by [facility] at [facility] OR 2ND FLR    APPLICATION, GRAFT, SKIN, SPLIT-THICKNESS Left 2/19/2014    Performed by [facility]ett [facility]aga [facility] at [facility] OR 2ND FLR    APPLICATION, GRAFT, SKIN, SPLIT-THICKNESS Left 1/15/2013    Performed by David Gandhi MD at [facility] OR 2ND FLR    DEBRIDEMENT-WOUND Left 5/29/2014    Performed by Lissett Arteaga MD at [facility] OR 2ND FLR    FOOT AMPUTATION  October 2010    left high midfoot amputation    INSERTION-INTRAOCULAR LENS (IOL) Right 9/5/2017    Performed by pEi [facility]ccarty [facility] at [facility]    PHACOEMULSIFICATION-ASPIRATION-CATARACT Right 9/5/2017    Performed by Epi [facility]ccarty [facility] at [facility] OR    PHACOEMULSIFICATION-ASPIRATION-CATARACT Left 6/13/2017    Performed by Epi Mccarty MD at [facility] OR       Review of patient's allergies indicates:  No Known Allergies    No current facility-administered medications on file prior to encounter.      Current Outpatient Medications on File Prior to Encounter   Medication Sig    atorvastatin (LIPITOR) 20 MG tablet TK 1 T PO QD    insulin detemir (LEVEMIR FLEXTOUCH) 100 unit/mL (3 mL) SubQ InPn pen Inject 25 units twice a day, need appt.    JANUVIA 100 mg Tab TAKE 1 TABLET BY MOUTH ONCE DAILY    metformin (GLUCOPHAGE) 500 MG tablet Take 2 tablets (1,000 mg total) by mouth 2 (two) times daily with meals.    sitagliptin (JANUVIA) 100 MG Tab Take 1 tablet (100 mg total) by mouth once daily.    tamsulosin (FLOMAX) 0.4 mg Cp24 Take 0.4 mg by mouth once daily.    ammonium lactate (LAC-HYDRIN) 12 % lotion     aspirin (ECOTRIN) 81 MG EC tablet Take 81 mg by mouth once daily.    atorvastatin (LIPITOR) 40 MG tablet Take 40 mg by mouth every evening.     BD ULTRA-FINE ADITYA  Thoracic Surgery Post Op Clinic Visit    Patient: Breonna Gutierrez Date: 2018   : 1949 Meds: Reviewed and updated in Epic   68 year old female      Date and type of surgery: Robotic-assisted left thoracoscopy with left upper lobectomy and lymph node dissection with Dr. Cesar Pitt on 2018 at [facility]    Reason for visit: Post-op visit    PMD: Holli Chacon MD    Oncologist: Dr. Castellanos     HPI:  Ms. Gutierrez is a 68-year-old female with long-standing history of tobacco use and COPD. She had an initial lung screening CT in 2017 which showed scattered bilateral lung nodules with focal nodular pleural thickening of the left lung apex measuring 1.6 x 1.1 cm.  Follow-up CT chest 2017 noted that the dominant nodule in the left lung apex was not changed, however a satellite nodule had increased in size to 6 mm (previously 3 millimeters).  Subsequently, a CT scan of the chest noted that the lobulated nodule left upper lobe had increased in size to 22 mm.  PET/CT was obtained due to concern for primary lung maligancy which noted an FDG avid left hilar lymph node measuring 1.8 cm, SUV max was 5.8. In addition AP window node with SUV max of 4.2 measuring 1.3 x 1.7 cm. The left apical pleural-based nodule measured 2.2 cm with an SUV of 5.8. CT-guided biopsy confirmed poorly differentiated adenocarcinoma consistent with lung primary. She underwent an endoscopic bronchial ultrasound on 3/30/2018. The trachea, right and left bronchial tree were unremarkable with no endobronchial lesion identified. A left hilar lymph node was positive for malignant cells, consistent with involvement by the patient's previously diagnosed adenocarcinoma.  Clinically staged as T1N2M0 (stage IIIA). She elected to undergo neoadjuvant chemotherapy and radiation prior to consideration for surgical resection. She completed 4500cGy in 25 fractions with concurrent carboplatin/paclitaxel on 2018.  Re-staging PET/CT on  "PEN NEEDLE 32 gauge x 5/32" Ndle USE FOUR TIMES DAILY WITH MEALS AND NIGHTLY    BESIVANCE 0.6 % DrpS INSTILL 1 DROP INTO THE OS TID    blood sugar diagnostic (CONTOUR TEST STRIPS) Strp Inject 1 strip into the skin 2 (two) times daily before meals.    blood sugar diagnostic Strp True metrix  Strips to be tested 2 times daily    DUREZOL 0.05 % Drop ophthalmic solution INSTILL 1 DROP INTO THE OS QID    econazole nitrate 1 % cream APPLY EXTERNALLY TO THE AFFECTED AREA ONCE DAILY    ibuprofen (ADVIL,MOTRIN) 600 MG tablet Take 1 tablet (600 mg total) by mouth every 6 (six) hours as needed.    ILEVRO 0.3 % DrpS INSTILL 1 DROP INTO THE OS QD    losartan (COZAAR) 50 MG tablet     MICROLET LANCET Misc     triamcinolone acetonide 0.1% (KENALOG) 0.1 % cream APPLY EXTERNALLY TO THE AFFECTED AREA TWICE DAILY AS DIRECTED     Family History     Problem Relation (Age of Onset)    Cancer Brother    Diabetes Mother, Sister    Heart disease Mother    Stroke Sister        Tobacco Use    Smoking status: Never Smoker    Smokeless tobacco: Never Used   Substance and Sexual Activity    Alcohol use: No     Comment: occassional    Drug use: No    Sexual activity: Not Currently     ROS   Constitution: Negative for fever, chills, weight loss or gain.   HENT: Negative for sore throat, rhinorrhea, or headache.  Eyes: Negative for blurred or double vision.   Cardiovascular: See above  Pulmonary: Positive for SOB   Gastrointestinal: Negative for abdominal pain, nausea, vomiting, or diarrhea.   : Negative for dysuria.   Neurological: Negative for focal weakness or sensory changes.    Objective:     Vital Signs (Most Recent):  Temp: 98.4 °F (36.9 °C) (10/15/18 0911)  Pulse: 90 (10/15/18 1037)  Resp: 17 (10/15/18 1037)  BP: 113/63 (10/15/18 1037)  SpO2: 96 % (10/15/18 1037) Vital Signs (24h Range):  Temp:  [98.4 °F (36.9 °C)] 98.4 °F (36.9 °C)  Pulse:  [81-90] 90  Resp:  [17-18] 17  SpO2:  [96 %] 96 %  BP: (113-139)/(58-63) 113/63 " 5/21/2018 showed no definite evidence of disease progression.  Chest CT with contrast showed marked decrease in size of the left upper lobe adenocarcinoma as well as decrease in size of the subaortic and left hilar lymphadenopathy.  Lobectomy was indicated. A robotic left upper lobectomy was recommended.  Patient underwent the above mentioned procedure on 6/28/2018.  Chest tube was removed on POD#2 and was discharged home on the same day. Final surgical pathology of pT1aN0.      Patient presents today for post-operative follow-up with her . Feels surgical site pain is not improving. Pain medication makes her drowsy but only slightly improves pain, described mostly as neuropathic. Taking 2 tabs Percocet nightly and 1 tab q4h during the day. Taking Zanaflex in am and nightly. Tried cold compresses which provided a little relief. Trying to walk a little more each day. Denies cough, fever/chills, or issues with surgical incisions. Appetite is fair.     Activity/Exercise: Accomplishing all ADL's Walking    Physical Examination    Vitals:    07/11/18 1420   BP: 108/68   Pulse: 89   Resp: 18   SpO2: 94%   Weight: 76.2 kg   Height: 5' 4\" (1.626 m)       Weight    07/11/18 1420   Weight: 76.2 kg       Lungs:Diminished breath sounds  base - left  Heart:Regular rate and rhythm, S1, S2 normal, no murmur, click, rub or gallop  Incision(s):Left thoracoscopy Clean, dry and intact and chest tube suture removed intact, anterior left chest incision slightly open, no drainage, erythema, warmth or swelling, cleansed and steri strips applied    Diagnostics:     07/11/18   XR Chest PA and Lateral    Impression IMPRESSION:    Postsurgical changes of left upper lobe lobectomy with interval removal of  left apical chest tube. No definite pneumothorax.           Pathology:   Pathologic Diagnosis :     A: Lymph node, station 9L, biopsy:     - Benign lymph node fragments.         B: Lymph node, station 10 L, biopsy:     - Benign lymph      Weight: 117.9 kg (260 lb)  Body mass index is 38.4 kg/m².    SpO2: 96 %       No intake or output data in the 24 hours ending 10/15/18 1355    Lines/Drains/Airways     Peripherally Inserted Central Catheter Line                 PICC Double Lumen 06/19/14 1639 right brachial 1578 days          Drain                 Closed/Suction Drain Left Foot Other (Comment) 1699 days          Airway                 Airway - Non-Surgical 06/13/17 1422 488 days         Airway - Non-Surgical 09/05/17 1230 Nasal Cannula 405 days          Peripheral Intravenous Line                 Peripheral IV - Single Lumen 06/13/17 1342 Left Hand 489 days         Peripheral IV - Single Lumen 10/15/18 1230 Right Hand less than 1 day         Peripheral IV - Single Lumen 10/15/18 1349 Left Forearm less than 1 day                Physical Exam  Constitutional: NAD, conversant  HEENT: Sclera anicteric, PERRLA, EOMI  Neck: No JVD, no carotid bruits  CV: RRR, no murmur, normal S1/S2, No Pericardial rub  Pulm: CTAB with no wheezes, rales, or rhonchi  GI: Abdomen soft, NTND, +BS  Extremities: 2+ edema B/L, stasis dermatitis, left foot stump w/o evidence of tenderness, erythema, swelling or discharge , warm and well perfused, No cyanosis, No clubbing  Psych: AOx3, appropriate affect    Significant Labs:   Blood Culture: No results for input(s): LABBLOO in the last 48 hours., CMP   Recent Labs   Lab  10/15/18   1036   NA  135*   K  4.1   CL  102   CO2  26   GLU  109   BUN  29*   CREATININE  2.0*   CALCIUM  9.7   PROT  7.4   ALBUMIN  2.2*   BILITOT  0.3   ALKPHOS  73   AST  76*   ALT  76*   ANIONGAP  7*   ESTGFRAFRICA  38.2*   EGFRNONAA  33.1*   , CBC   Recent Labs   Lab  10/15/18   1036   WBC  9.52   HGB  10.3*   HCT  33.2*   PLT  352*   , INR No results for input(s): INR, PROTIME in the last 48 hours., Lipid Panel No results for input(s): CHOL, HDL, LDLCALC, TRIG, CHOLHDL in the last 48 hours., Troponin   Recent Labs   Lab  10/15/18   1036   TROPONINI   node fragments.         C: Lymph node, station 7, biopsy:     - Benign lymph node fragments.         D: Lymph node, station 5L, biopsy:     - Benign lymph node fragments with evidence of completely treated tumor.         E: Lymph node, station 12 L, biopsy:     - Benign lymph node fragments with evidence of completely treated tumor.         F: Lymph node, station 11 L, biopsy:     - Benign lymph node fragments.         G: Lung, left upper lobe, lobectomy:     - Minimal residual adenocarcinoma (scattered foci of tumor measure    approximately 1 cm in aggregate) in a background of extensive treatment    effect.     - Two benign lymph nodes (0/2).     - Surgical margin negative.         H: Lymph node, station 8L, biopsy:     - Benign lymph node.         Diagnosis Comment:     LUNG     SPECIMEN          Procedure:   Lobectomy     Specimen Laterality:   Left     TUMOR          Tumor Site:   Upper lobe     Histologic Type :            Invasive adenocarcinoma, predominant subtype cannot be determined:      minimal residual disease          Tumor Size:   Cannot be determined:   scattered minute foci of residual tumor, measuring approximately 1 cm in aggregate     Tumor Focality :   Cannot be determined:   scattered foci of residual tumor     Tumor Extent          Visceral Pleura Invasion:   Not identified     Direct Invasion of Adjacent Structures:   No adjacent structures present     Accessory Findings          Treatment Effect:   Less than 10% residual viable tumor     Lymphovascular Invasion:   Not identified     MARGINS          Margins:   All margins are uninvolved by carcinoma          Margins Examined:   Bronchial, Vascular     Distance of Invasive Carcinoma from Closest Margin in Centimeters:   4.0     Centimeters (cm)          Closest Margin:   Bronchial, Vascular     LYMPH NODES          Number of Lymph Nodes Involved:   0     Number of Lymph Nodes Examined:   Number cannot be determined:   2 intact     lymph  0.052*    and All pertinent lab results from the last 24 hours have been reviewed.    Significant Imaging: Echocardiogram: 2D echo with color flow doppler: No results found for this or any previous visit.   nodes, multiple lymph node fragments          Levar Stations Examined:   9L: Pulmonary ligament, 10L: Hilar, 7:     Subcarinal, 5: Subaortic/ aortopulmonary (AP) / AP window, 11L: Interlobar,     12L: Lobar, 8L: Para-esophageal     PATHOLOGIC STAGE CLASSIFICATION (pTNM, AJCC 8th Edition)          TNM Descriptors:   y (post-treatment)     Primary Tumor (pT):   pT1a: Tumor <= 1 cm in greatest dimension. A     superficial, spreading tumor of any size whose invasive component is limited     to the bronchial wall and may extend proximal to the main bronchus also is     classified as T1a, but these tumors are uncommon     Regional Lymph Nodes (pN):   pN0: No regional lymph node metastasis       Stage: pT1aN0    Impression: Ms. Gutierrez is a 68-year old female with non-small cell lung cancer (adenocarcinoma) of the left upper lobe, [id number] (stage IIIA - 8th), s/p neoadjuvant chemoRT and robotic left upper lobectomy on 6/28/2018. Surgical pathology as above pT1aN0.  Reports persistent moderate surgical site pain not adequately relieved with Percocet and Tizanidine. Cannot take Gabapentin - causes vomiting. Otherwise making a satisfactory recovery.      Plan:   Continue Percocet and Tizanidine  Try Ibuprofen 600mg q6h PRN and cold compresses PRN  Call if pain is not improving with above  Follow-up with Dr. Castellanos as scheduled.   Follow-up with Dr. Pitt as needed.       Thoracic Surgery  Jessica RAMAN [phone]    CC:   MD Dr. Jasmin Joy

## 2018-10-15 NOTE — ASSESSMENT & PLAN NOTE
Mr. Saji Castañeda is a 69 yo M with PMH of HTN, HLD, T2DM on insulin, left foot OM s/p foot amputation, recent UTI treated with bactrim who presented to the ED with c/o right lateral chest pain, right paraspinal back pain, and right hip pain that started about 2 weeks ago when he fell from his wheelchair when trying to stand up to urinate in his bathroom. Cardiology consulted for elevated troponin and BNP.    Recommend:  Admit to [facility] for YNES  CP is non-cardiac; most likely musculoskeletal in nature 2/2 recent traumatic fall  Trend troponin - believe troponin rise is 2/2 underlying YNES  Get TTE to assess baseline LV function  IV fluids for YNES  Can follow up in cardiology clinic if the patient shows persistence in exertional symptoms of CP or SOB

## 2018-10-15 NOTE — ED PROVIDER NOTES
Encounter Date: 10/15/2018    SCRIBE #1 NOTE: I, Luly Mendoza, am scribing for, and in the presence of,  Dr. Maciel . I have scribed the following portions of the note - the EKG reading.       History     Chief Complaint   Patient presents with    Fever     seen by gen care dr told has ??pneumonia last week on antibiotics bladder infection, fever     68 year old male with medical history of DM, HLD, Hx of R foot amputation 2/2 osteomyelitis presenting to the ED with the chief complaint of right sided chest pain. Patient reports the pain began 2 weeks ago after a fall. He describes it as an aching sensation with intermittent sharp pains whenever he takes a deep breath or with position changes. He reports associated green productive cough and subjective fever. He saw his PCP 1 week ago regarding his symptoms and was diagnosed with pneumonia after having a CXR. Patient reports he is currently on ABx, but he does not know the name of the antibiotics. Patient was seen in the ED 2 weeks ago and diagnosed with UTI and given Levaquin that he is also currently still taking. He reports his urinary symptoms have not improved much despite being compliant with ABx therapy. Patient uses a wheelchair and walker for ambulation. He denies shortness of breath, abdominal pain, nausea, vomiting, appetite changes, diarrhea, constipation.           Review of patient's allergies indicates:  No Known Allergies  Past Medical History:   Diagnosis Date    Arthritis     legs    Diabetes mellitus     Diabetes mellitus, type 2     Hyperlipidemia     Osteomyelitis      Past Surgical History:   Procedure Laterality Date    ANGIOGRAM-EXTREMITY-LOWER Left 5/29/2014    Performed by [facility]aga [facility] at [facility] OR 2ND FLR    APPLICATION, GRAFT, SKIN, SPLIT-THICKNESS Left 2/19/2014    Performed by [facility]ett [facility]aga [facility] at [facility] OR 2ND FLR    APPLICATION, GRAFT, SKIN, SPLIT-THICKNESS Left 1/15/2013    Performed by David Gandhi MD at [facility]  OR 2ND FLR    DEBRIDEMENT-WOUND Left 5/29/2014    Performed by [facility] at [facility] OR 2ND FLR    FOOT AMPUTATION  October 2010    left high midfoot amputation    INSERTION-INTRAOCULAR LENS (IOL) Right 9/5/2017    Performed by Epi [facility]ccarty [facility] at [facility] OR    PHACOEMULSIFICATION-ASPIRATION-CATARACT Right 9/5/2017    Performed by Epi [facility]cc[facility] at [facility] OR    PHACOEMULSIFICATION-ASPIRATION-CATARACT Left 6/13/2017    Performed by Epi [facility]ccarty [facility] at [facility] OR     Family History   Problem Relation Age of Onset    Diabetes Mother     Heart disease Mother     Stroke Sister     Cancer Brother     Diabetes Sister      Social History     Tobacco Use    Smoking status: Never Smoker    Smokeless tobacco: Never Used   Substance Use Topics    Alcohol use: No     Comment: occassional    Drug use: No     Review of Systems   Constitutional: Positive for fatigue and fever (subjective). Negative for chills and diaphoresis.   HENT: Negative for congestion, sore throat and trouble swallowing.    Eyes: Negative for pain and redness.   Respiratory: Positive for cough. Negative for chest tightness and shortness of breath.    Cardiovascular: Positive for chest pain (R lower ribs, R lateral thorax).   Gastrointestinal: Negative for abdominal pain, diarrhea, nausea and vomiting.   Genitourinary: Positive for dysuria. Negative for flank pain and hematuria.   Musculoskeletal: Negative for back pain, neck pain and neck stiffness.   Skin: Negative for wound.   Neurological: Negative for weakness, light-headedness and headaches.       Physical Exam     Initial Vitals [10/15/18 0911]   BP Pulse Resp Temp SpO2   (!) 139/58 81 18 98.4 °F (36.9 °C) 96 %      MAP       --         Physical Exam    Constitutional: He appears well-developed and well-nourished. He is not diaphoretic. No distress.   HENT:   Head: Normocephalic and atraumatic.   Mouth/Throat: Oropharynx is clear and moist. No oropharyngeal exudate.   Eyes: EOM  are normal. Pupils are equal, round, and reactive to light.   Neck: Normal range of motion. Neck supple.   Cardiovascular: Normal rate, regular rhythm and intact distal pulses.   +1 pitting edema LLE (R foot amputation)   Pulmonary/Chest: Breath sounds normal. No respiratory distress. He has no wheezes.         He exhibits tenderness (Reproducible R sided chest pain to palpation).       Abdominal: Soft. There is no tenderness.   Genitourinary:   Genitourinary Comments: No CVA tenderness   Musculoskeletal: Normal range of motion. He exhibits no tenderness.   R foot amputation to ankle  No midline spinal tenderness   Neurological: He is alert and oriented to person, place, and time. He has normal strength. No cranial nerve deficit or sensory deficit.   Skin: Skin is warm and dry. No erythema.        ED Course   Procedures  Labs Reviewed   CBC W/ AUTO DIFFERENTIAL - Abnormal; Notable for the following components:       Result Value    RBC 3.78 (*)     Hemoglobin 10.3 (*)     Hematocrit 33.2 (*)     MCHC 31.0 (*)     RDW 15.6 (*)     Platelets 352 (*)     MPV 8.7 (*)     Lymph # 0.8 (*)     Gran% 79.5 (*)     Lymph% 8.7 (*)     All other components within normal limits   TROPONIN I - Abnormal; Notable for the following components:    Troponin I 0.052 (*)     All other components within normal limits   URINALYSIS, REFLEX TO URINE CULTURE - Abnormal; Notable for the following components:    Protein, UA 2+ (*)     All other components within normal limits    Narrative:     Preferred Collection Type->Urine, Clean Catch   B-TYPE NATRIURETIC PEPTIDE - Abnormal; Notable for the following components:     (*)     All other components within normal limits   COMPREHENSIVE METABOLIC PANEL - Abnormal; Notable for the following components:    Sodium 135 (*)     BUN, Bld 29 (*)     Creatinine 2.0 (*)     Albumin 2.2 (*)     AST 76 (*)     ALT 76 (*)     Anion Gap 7 (*)     eGFR if  38.2 (*)     eGFR if non   33.1 (*)     All other components within normal limits   TROPONIN I - Abnormal; Notable for the following components:    Troponin I 0.050 (*)     All other components within normal limits    Narrative:     Please draw a second troponin at 1:26pm   D DIMER, QUANTITATIVE - Abnormal; Notable for the following components:    D-Dimer 2.47 (*)     All other components within normal limits    Narrative:     ADD ON DDIMR PER: ANGELA OWENS MD  10/15/2018  12:36    D DIMER, QUANTITATIVE   URINALYSIS MICROSCOPIC    Narrative:     Preferred Collection Type->Urine, Clean Catch   POCT GLUCOSE   POCT GLUCOSE MONITORING CONTINUOUS     EKG Readings: (Independently Interpreted)   Narrow complex QRS with normal rate of 88. No STEMI. No discernible P waves.        Imaging Results          CTA Chest Non-Coronary (PE Study) (Final result)     Abnormal  Result time 10/15/18 15:52:35    Final result by Meli Witt MD (10/15/18 15:52:35)                 Impression:      Exam significantly limited secondary to extensive patient motion artifact and suboptimal contrast bolus timing.  Within limitations, no evidence of pulmonary embolism into the lobar pulmonary arteries or evidence to suggest pulmonary infarction.    Partially visualized left kidney demonstrates mildly irregular contour; consider dedicated ultrasound evaluation if clinically indicated.    Stable mediastinal lymphadenopathy unchanged dating back to February 2011.    Additional, stable findings as above.    This report was flagged in Epic as abnormal.    Electronically signed by resident: Joo George  Date:    10/15/2018  Time:    15:09    Electronically signed by: Meli Witt MD  Date:    10/15/2018  Time:    15:52             Narrative:    EXAMINATION:  CTA CHEST NON CORONARY    CLINICAL HISTORY:  Chest pain, acute, PE suspected, intermed prob, positive D-dimer;    TECHNIQUE:  Low dose axial images, sagittal and coronal reformations were  obtained from the thoracic inlet to the lung bases following the IV administration of 100 mL of Omnipaque 350.  Contrast timing was optimized to evaluate the pulmonary arteries.  Exam significantly limited secondary to extensive patient motion artifact and suboptimal contrast bolus timing.    COMPARISON:  Chest radiograph 10/15/2018, CT chest 02/09/2011    FINDINGS:  No convincing evidence of abnormality at the base of the neck.  There is left-sided arch with 3 branch vessels.  Aorta maintains normal caliber, contour and course without significant calcific atherosclerosis.    Heart is normal in size without significant pericardial effusion.  Redemonstration of mediastinal lymphadenopathy with paratracheal lymph node measuring up to 1.5 cm in greatest diameter, similar to prior.    Pulmonary arteries distribute normally.  Accounting for significant limitations, there is no intraluminal filling defect out to the lobar pulmonary arteries to suggest pulmonary embolism.  Systemic and pulmonary veno atrial connections are concordant.    Trachea and proximal airways remain patent.  Lungs are symmetrically expanded without evidence of pulmonary infarction, significant consolidation, pleural effusion, pneumothorax, or mass.    Esophagus maintains normal caliber and course.  Partially visualized left kidney demonstrates mildly irregular contour.  No acute osseous abnormality and no acute abnormality of the extrathoracic soft tissues.                               X-Ray Chest PA And Lateral (Final result)  Result time 10/15/18 10:17:32    Final result by Leonel Palma III, MD (10/15/18 10:17:32)                 Impression:      See above    No acute process seen.      Electronically signed by: Leonel Palma MD  Date:    10/15/2018  Time:    10:17             Narrative:    EXAMINATION:  XR CHEST PA AND LATERAL    CLINICAL HISTORY:  Other chest pain    FINDINGS:  There is borderline cardiomegaly and DJD.  Lungs are clear.                                X-Ray Ribs 2 View Right (Final result)  Result time 10/15/18 10:18:09    Final result by Leonel Palma III, MD (10/15/18 10:18:09)                 Impression:      See above      Electronically signed by: Leonel Palma MD  Date:    10/15/2018  Time:    10:18             Narrative:    EXAMINATION:  XR RIBS 2 VIEW RIGHT    CLINICAL HISTORY:  Other chest pain    FINDINGS:  No rib fracture or rib lesion seen.  No pneumothorax pleural fluid or lung contusion seen.                                 Medical Decision Making:   Independently Interpreted Test(s):   I have ordered and independently interpreted EKG Reading(s) - see prior notes       APC / Resident Notes:   68 year old male with medical history of DM, HLD, Hx of R foot amputation 2/2 osteomyelitis presenting to the ED c/o 2 weeks of R sided chest pain and fatigue. DDx includes but not limited to chest wall strain, rib injury, contusion, pneumonia, pneumothorax, ACS, cardiac arrhythmia, electrolyte disturbance, rhabdo, dehydration, YNES, UTI. Will get CXR and rib x-ray. Will get labs.     11:45 PM  Work-up shows WBC 9.5, H/H 10/33, Trop elevated 0.052 (B/L 0.007 on 7/18/11), BNP elevated 292 (B/L 7/17/11), Creatine elevated 2.0 (1.3 on 10/1/18), GFR decreased 38 (>60 10/1/18), Na 135, K 4.1, CO2 26, CXR without acute intrathoracic processes. UA negative for UTI. Will add D-dimer, 2nd Trop, and discuss with Cardiology.    3:42 PM  Cardiology evaluated patient at bedside. Do not feel chest pain related to cardiac process. ECG consistent with Type I AV block per their interpretation. Recommending admission to medicine for IV fluids for YNES, troponing trending, and TTE for LV functioning evaluation. Discussed CTA with radiology and do not see signs of PE. Discussed with CM and will place in observation for further management. Patient agreeable to the plan. I have discussed the care of this patient with my supervising physician.           Scribe Attestation:   Scribe #1: I performed the above scribed service and the documentation accurately describes the services I performed. I attest to the accuracy of the note.    Attending Attestation:     Physician Attestation Statement for NP/PA:   I have conducted a face to face encounter with this patient in addition to the NP/PA, due to Medical Complexity        Physician Attestation for Scribe:  Physician Attestation Statement for Scribe #1: I, Raheem GOMEZ, reviewed documentation, as scribed by in my presence, and it is both accurate and complete.                    Clinical Impression:   The primary encounter diagnosis was YNES (acute kidney injury). Diagnoses of Chest discomfort, Elevated troponin, and SOBOE (shortness of breath on exertion) were also pertinent to this visit.      Disposition:   Disposition: Placed in Observation  Condition: Fair                        Juan Anders PA-C  10/15/18 1819       Hermilo Maciel MD  10/16/18 6528

## 2018-10-15 NOTE — ASSESSMENT & PLAN NOTE
- DM diet  - Home regimen: insulin detemir 30u AM, 26u PM  - Hospital regimen: insulin determir 22u BID; aspart 6u TIDWM  - will hold JANUVIA 100 mg Tab, metformin (GLUCOPHAGE) 500 MG tablet   yes/with open heart surgery

## 2018-10-15 NOTE — ED NOTES
Patient identifiers verified and correct for Saji Castañeda.   LOC: The patient is awake, alert and aware of environment with an appropriate affect, the patient is oriented x 3 and speaking appropriately.   APPEARANCE: Patient appears comfortable and in no acute distress, patient is clean and well groomed.  SKIN: The skin is warm and dry, color consistent with ethnicity, patient has normal skin turgor and moist mucus membranes, skin intact, no breakdown or bruising noted.   MUSCULOSKELETAL: Patient moving all extremities spontaneously, no swelling noted. Pt has special shoe on left foot s/p foot amputation.   RESPIRATORY: Airway is open and patent, respirations are spontaneous, patient has a normal effort and rate, no accessory muscle use noted, pt placed on continuous pulse ox with O2 sats noted at 96% on room air.  CARDIAC: Pt placed on cardiac monitor. Patient has a normal rate and regular rhythm, no edema noted, capillary refill < 3 seconds.   GASTRO: Soft and non tender to palpation, no distention noted, normoactive bowel sounds present in all four quadrants. Pt states bowel movements have been regular.  : Pt denies any pain or frequency with urination.  NEURO: Pt opens eyes spontaneously, behavior appropriate to situation, follows commands, facial expression symmetrical, bilateral hand grasp equal and even, purposeful motor response noted, normal sensation in all extremities when touched with a finger.

## 2018-10-15 NOTE — CONSULTS
[facility]  Cardiology  Consult Note    Patient Name: Saji Castañeda  MRN: [id number]  Admission Date: 10/15/2018  Hospital Length of Stay: 0 days  Code Status: No Order   Attending Provider: Hermilo Maciel MD   Consulting Provider: Brittany Vargas MD  Primary Care Physician: Serge Holland MD  Principal Problem:<principal problem not specified>    Patient information was obtained from patient and past medical records.     Inpatient consult to Cardiology  Consult performed by: Brittany Vargas MD  Consult ordered by: Juan Anders PA-C  Reason for consult: chest pain, elevated troponin        Subjective:     Chief Complaint:  Chest pain     HPI:   Mr. Saji Castañeda is a 67 yo M with PMH of HTN, HLD, T2DM on insulin, left foot OM s/p foot amputation, recent UTI treated with bactrim who presented to the ED with c/o right lateral chest pain, right paraspinal back pain, and right hip pain that started about 2 weeks ago when he fell from his wheelchair when trying to stand up to urinate in his bathroom. He reports he fell on his right side and the point of impact was his right arm and right hip. Reports his right CP is aggravated with right arm movement and deep breathing. Reports chronic SOB after using pushing his wheelchair up to 300 feet. Denies any exertional component to his chest pain. Denies any prior cardiac hx of MI, PCI, CABG. In the ED, his EKG showed NSR, rate 88, 1st degree AVB (P buried in T wave) - ISIAH >400. In the past EKGs, he is known to have this. Tn 0.052, , CXR/RIB series negative for fractures, acute lung infiltrates or evidence of pulmonary edema. Rest of lab work showed a rise in AST 76, ALT 76, TBILI 0.3, Cr 2.0 (up from baseline of 1.1). He was seen in the ED for recent UTI that had failed bactrim so he was prescribed levofloxacin, ibuprofen 600 mg QID PRN, and tramadol.       Past Medical History:   Diagnosis Date    Arthritis     legs    Diabetes  "mellitus     Diabetes mellitus, type 2     Hyperlipidemia     Osteomyelitis        Past Surgical History:   Procedure Laterality Date    ANGIOGRAM-EXTREMITY-LOWER Left 5/29/2014    Performed by L[facility]ett Ar[facility]aga [facility] at [facility] OR 2ND FLR    APPLICATION, GRAFT, SKIN, SPLIT-THICKNESS Left 2/19/2014    Performed by Lissett Arteaga MD at [facility] OR 2ND FLR    APPLICATION, GRAFT, SKIN, SPLIT-THICKNESS Left 1/15/2013    Performed by David Gandhi MD at [facility] OR 2ND FLR    DEBRIDEMENT-WOUND Left 5/29/2014    Performed by Lissett Arteaga MD at [facility] OR 2ND FLR    FOOT AMPUTATION  October 2010    left high midfoot amputation    INSERTION-INTRAOCULAR LENS (IOL) Right 9/5/2017    Performed by [facility] at [facility] OR    PHACOEMULSIFICATION-ASPIRATION-CATARACT Right 9/5/2017    Performed by Epi Mccarty [facility] at [facility] OR    PHACOEMULSIFICATION-ASPIRATION-CATARACT Left 6/13/2017    Performed by Epi Mccarty MD at [facility] OR       Review of patient's allergies indicates:  No Known Allergies    No current facility-administered medications on file prior to encounter.      Current Outpatient Medications on File Prior to Encounter   Medication Sig    atorvastatin (LIPITOR) 20 MG tablet TK 1 T PO QD    insulin detemir (LEVEMIR FLEXTOUCH) 100 unit/mL (3 mL) SubQ InPn pen Inject 25 units twice a day, need appt.    JANUVIA 100 mg Tab TAKE 1 TABLET BY MOUTH ONCE DAILY    metformin (GLUCOPHAGE) 500 MG tablet Take 2 tablets (1,000 mg total) by mouth 2 (two) times daily with meals.    sitagliptin (JANUVIA) 100 MG Tab Take 1 tablet (100 mg total) by mouth once daily.    tamsulosin (FLOMAX) 0.4 mg Cp24 Take 0.4 mg by mouth once daily.    ammonium lactate (LAC-HYDRIN) 12 % lotion     aspirin (ECOTRIN) 81 MG EC tablet Take 81 mg by mouth once daily.    atorvastatin (LIPITOR) 40 MG tablet Take 40 mg by mouth every evening.     BD ULTRA-FINE ADITYA PEN NEEDLE 32 gauge x 5/32" Ndle USE FOUR TIMES DAILY WITH MEALS AND " NIGHTLY    BESIVANCE 0.6 % DrpS INSTILL 1 DROP INTO THE OS TID    blood sugar diagnostic (CONTOUR TEST STRIPS) Strp Inject 1 strip into the skin 2 (two) times daily before meals.    blood sugar diagnostic Strp True metrix  Strips to be tested 2 times daily    DUREZOL 0.05 % Drop ophthalmic solution INSTILL 1 DROP INTO THE OS QID    econazole nitrate 1 % cream APPLY EXTERNALLY TO THE AFFECTED AREA ONCE DAILY    ibuprofen (ADVIL,MOTRIN) 600 MG tablet Take 1 tablet (600 mg total) by mouth every 6 (six) hours as needed.    ILEVRO 0.3 % DrpS INSTILL 1 DROP INTO THE OS QD    losartan (COZAAR) 50 MG tablet     MICROLET LANCET Misc     triamcinolone acetonide 0.1% (KENALOG) 0.1 % cream APPLY EXTERNALLY TO THE AFFECTED AREA TWICE DAILY AS DIRECTED     Family History     Problem Relation (Age of Onset)    Cancer Brother    Diabetes Mother, Sister    Heart disease Mother    Stroke Sister        Tobacco Use    Smoking status: Never Smoker    Smokeless tobacco: Never Used   Substance and Sexual Activity    Alcohol use: No     Comment: occassional    Drug use: No    Sexual activity: Not Currently     ROS   Constitution: Negative for fever, chills, weight loss or gain.   HENT: Negative for sore throat, rhinorrhea, or headache.  Eyes: Negative for blurred or double vision.   Cardiovascular: See above  Pulmonary: Positive for SOB   Gastrointestinal: Negative for abdominal pain, nausea, vomiting, or diarrhea.   : Negative for dysuria.   Neurological: Negative for focal weakness or sensory changes.    Objective:     Vital Signs (Most Recent):  Temp: 98.4 °F (36.9 °C) (10/15/18 0911)  Pulse: 90 (10/15/18 1037)  Resp: 17 (10/15/18 1037)  BP: 113/63 (10/15/18 1037)  SpO2: 96 % (10/15/18 1037) Vital Signs (24h Range):  Temp:  [98.4 °F (36.9 °C)] 98.4 °F (36.9 °C)  Pulse:  [81-90] 90  Resp:  [17-18] 17  SpO2:  [96 %] 96 %  BP: (113-139)/(58-63) 113/63     Weight: 117.9 kg (260 lb)  Body mass index is 38.4 kg/m².    SpO2:  96 %       No intake or output data in the 24 hours ending 10/15/18 1355    Lines/Drains/Airways     Peripherally Inserted Central Catheter Line                 PICC Double Lumen 06/19/14 1639 right brachial 1578 days          Drain                 Closed/Suction Drain Left Foot Other (Comment) 1699 days          Airway                 Airway - Non-Surgical 06/13/17 1422 488 days         Airway - Non-Surgical 09/05/17 1230 Nasal Cannula 405 days          Peripheral Intravenous Line                 Peripheral IV - Single Lumen 06/13/17 1342 Left Hand 489 days         Peripheral IV - Single Lumen 10/15/18 1230 Right Hand less than 1 day         Peripheral IV - Single Lumen 10/15/18 1349 Left Forearm less than 1 day                Physical Exam  Constitutional: NAD, conversant  HEENT: Sclera anicteric, PERRLA, EOMI  Neck: No JVD, no carotid bruits  CV: RRR, no murmur, normal S1/S2, No Pericardial rub  Pulm: CTAB with no wheezes, rales, or rhonchi  GI: Abdomen soft, NTND, +BS  Extremities: 2+ edema B/L, stasis dermatitis, left foot stump w/o evidence of tenderness, erythema, swelling or discharge , warm and well perfused, No cyanosis, No clubbing  Psych: AOx3, appropriate affect    Significant Labs:   Blood Culture: No results for input(s): LABBLOO in the last 48 hours., CMP   Recent Labs   Lab  10/15/18   1036   NA  135*   K  4.1   CL  102   CO2  26   GLU  109   BUN  29*   CREATININE  2.0*   CALCIUM  9.7   PROT  7.4   ALBUMIN  2.2*   BILITOT  0.3   ALKPHOS  73   AST  76*   ALT  76*   ANIONGAP  7*   ESTGFRAFRICA  38.2*   EGFRNONAA  33.1*   , CBC   Recent Labs   Lab  10/15/18   1036   WBC  9.52   HGB  10.3*   HCT  33.2*   PLT  352*   , INR No results for input(s): INR, PROTIME in the last 48 hours., Lipid Panel No results for input(s): CHOL, HDL, LDLCALC, TRIG, CHOLHDL in the last 48 hours., Troponin   Recent Labs   Lab  10/15/18   1036   TROPONINI  0.052*    and All pertinent lab results from the last 24 hours have been  reviewed.    Significant Imaging: Echocardiogram: 2D echo with color flow doppler: No results found for this or any previous visit.    Assessment and Plan:     Atypical chest pain    Mr. Saji Castañeda is a 69 yo M with PMH of HTN, HLD, T2DM on insulin, left foot OM s/p foot amputation, recent UTI treated with bactrim who presented to the ED with c/o right lateral chest pain, right paraspinal back pain, and right hip pain that started about 2 weeks ago when he fell from his wheelchair when trying to stand up to urinate in his bathroom. Cardiology consulted for elevated troponin and BNP.    Recommend:  Admit to HM for YNES  CP is non-cardiac; most likely musculoskeletal in nature 2/2 recent traumatic fall  Trend troponin - believe troponin rise is 2/2 underlying YNES  Get TTE to assess baseline LV function  IV fluids for YNES  Can follow up in cardiology clinic if the patient shows persistence in exertional symptoms of CP or SOB              VTE Risk Mitigation (From admission, onward)    None          Thank you for your consult. I will follow-up with patient. Please contact us if you have any additional questions.    Brittany Vargas MD  Cardiology   [facility]

## 2018-10-15 NOTE — SUBJECTIVE & OBJECTIVE
Past Medical History:   Diagnosis Date    Arthritis     legs    Diabetes mellitus     Diabetes mellitus, type 2     Hyperlipidemia     Osteomyelitis        Past Surgical History:   Procedure Laterality Date    ANGIOGRAM-EXTREMITY-LOWER Left 5/29/2014    Performed by Lissett Ar[facility]aga [facility] at [facility] OR 2ND [facility]    APPLICATION, GRAFT, SKIN, SPLIT-THICKNESS Left 2/19/2014    Performed by Lissett [facility]teaga MD at [facility] OR [facility]    APPLICATION, GRAFT, SKIN, SPLIT-THICKNESS Left 1/15/2013    Performed by David Gandhi MD at [facility] OR [facility]    DEBRIDEMENT-WOUND Left 5/29/2014    Performed by Lissett Arteaga MD at [facility] OR [facility]    FOOT AMPUTATION  October 2010    left high midfoot amputation    INSERTION-INTRAOCULAR LENS (IOL) Right 9/5/2017    Performed by Epi Mccarty MD at [facility] [place]    PHACOEMULSIFICATION-ASPIRATION-CATARACT Right 9/5/2017    Performed by Epi Mccarty MD at [facility] [place]    PHACOEMULSIFICATION-ASPIRATION-CATARACT Left 6/13/2017    Performed by Epi Mccarty MD at [facility] [place]       Review of patient's allergies indicates:  No Known Allergies    No current facility-administered medications on file prior to encounter.      Current Outpatient Medications on File Prior to Encounter   Medication Sig    atorvastatin (LIPITOR) 20 MG tablet TK 1 T PO QD    insulin detemir (LEVEMIR FLEXTOUCH) 100 unit/mL (3 mL) SubQ InPn pen Inject 25 units twice a day, need appt.    JANUVIA 100 mg Tab TAKE 1 TABLET BY MOUTH ONCE DAILY    metformin (GLUCOPHAGE) 500 MG tablet Take 2 tablets (1,000 mg total) by mouth 2 (two) times daily with meals.    sitagliptin (JANUVIA) 100 MG Tab Take 1 tablet (100 mg total) by mouth once daily.    tamsulosin (FLOMAX) 0.4 mg Cp24 Take 0.4 mg by mouth once daily.    ammonium lactate (LAC-HYDRIN) 12 % lotion     aspirin (ECOTRIN) 81 MG EC tablet Take 81 mg by mouth once daily.    atorvastatin (LIPITOR) 40 MG tablet Take 40 mg by mouth every evening.     BD ULTRA-FINE ADITYA  "PEN NEEDLE 32 gauge x 5/32" Ndle USE FOUR TIMES DAILY WITH MEALS AND NIGHTLY    BESIVANCE 0.6 % DrpS INSTILL 1 DROP INTO THE OS TID    blood sugar diagnostic (CONTOUR TEST STRIPS) Strp Inject 1 strip into the skin 2 (two) times daily before meals.    blood sugar diagnostic Strp True metrix  Strips to be tested 2 times daily    DUREZOL 0.05 % Drop ophthalmic solution INSTILL 1 DROP INTO THE OS QID    econazole nitrate 1 % cream APPLY EXTERNALLY TO THE AFFECTED AREA ONCE DAILY    ibuprofen (ADVIL,MOTRIN) 600 MG tablet Take 1 tablet (600 mg total) by mouth every 6 (six) hours as needed.    ILEVRO 0.3 % DrpS INSTILL 1 DROP INTO THE OS QD    losartan (COZAAR) 50 MG tablet     MICROLET LANCET Misc     triamcinolone acetonide 0.1% (KENALOG) 0.1 % cream APPLY EXTERNALLY TO THE AFFECTED AREA TWICE DAILY AS DIRECTED     Family History     Problem Relation (Age of Onset)    Cancer Brother    Diabetes Mother, Sister    Heart disease Mother    Stroke Sister        Tobacco Use    Smoking status: Never Smoker    Smokeless tobacco: Never Used   Substance and Sexual Activity    Alcohol use: No     Comment: occassional    Drug use: No    Sexual activity: Not Currently     Review of Systems   Constitutional: Positive for fever.   HENT: Positive for sinus pressure. Negative for facial swelling, trouble swallowing and voice change.    Eyes: Negative for photophobia and visual disturbance.   Respiratory: Positive for cough (greensh sputum) and shortness of breath.    Cardiovascular: Positive for chest pain and leg swelling. Negative for palpitations.   Gastrointestinal: Positive for nausea. Negative for abdominal pain and constipation.   Genitourinary: Positive for dysuria. Negative for difficulty urinating.   Musculoskeletal: Positive for gait problem. Negative for back pain.   Neurological: Positive for weakness. Negative for dizziness, tremors, light-headedness and headaches.   Psychiatric/Behavioral: Negative for " agitation and behavioral problems.     Objective:     Vital Signs (Most Recent):  Temp: 98.4 °F (36.9 °C) (10/15/18 0911)  Pulse: 62 (10/15/18 1746)  Resp: 17 (10/15/18 1037)  BP: 131/65 (10/15/18 1746)  SpO2: 97 % (10/15/18 1746) Vital Signs (24h Range):  Temp:  [98.4 °F (36.9 °C)] 98.4 °F (36.9 °C)  Pulse:  [62-90] 62  Resp:  [17-18] 17  SpO2:  [96 %-97 %] 97 %  BP: (113-147)/(58-69) 131/65     Weight: 117.9 kg (260 lb)  Body mass index is 38.4 kg/m².    Physical Exam   Constitutional: He is oriented to person, place, and time. He appears well-nourished. No distress.   HENT:   Head: Normocephalic.   Eyes: EOM are normal.   Neck: Normal range of motion.   Cardiovascular: Normal rate and regular rhythm.   Pulmonary/Chest: Effort normal and breath sounds normal. No respiratory distress. He has no wheezes. He exhibits no tenderness.   Abdominal: Soft. Bowel sounds are normal. He exhibits no distension. There is tenderness.   Musculoskeletal: Normal range of motion. He exhibits edema (3+pitting) and tenderness.   L BKA   Neurological: He is alert and oriented to person, place, and time.   Skin: Skin is warm and dry.   Psychiatric: He has a normal mood and affect. His behavior is normal.   Nursing note and vitals reviewed.        CRANIAL NERVES     CN III, IV, VI   Extraocular motions are normal.        Significant Labs: All pertinent labs within the past 24 hours have been reviewed.    Significant Imaging: I have reviewed all pertinent imaging results/findings within the past 24 hours.

## 2018-10-16 VITALS
WEIGHT: 260 LBS | OXYGEN SATURATION: 98 % | HEIGHT: 69 IN | SYSTOLIC BLOOD PRESSURE: 119 MMHG | RESPIRATION RATE: 18 BRPM | BODY MASS INDEX: 38.51 KG/M2 | TEMPERATURE: 98 F | DIASTOLIC BLOOD PRESSURE: 61 MMHG | HEART RATE: 90 BPM

## 2018-10-16 LAB
ALBUMIN SERPL BCP-MCNC: 2 G/DL
ALP SERPL-CCNC: 72 U/L
ALT SERPL W/O P-5'-P-CCNC: 64 U/L
ANION GAP SERPL CALC-SCNC: 8 MMOL/L
ANION GAP SERPL CALC-SCNC: 8 MMOL/L
AORTIC VALVE REGURGITATION: NORMAL
AST SERPL-CCNC: 70 U/L
BASOPHILS # BLD AUTO: 0.07 K/UL
BASOPHILS NFR BLD: 0.9 %
BILIRUB SERPL-MCNC: 0.3 MG/DL
BUN SERPL-MCNC: 23 MG/DL
BUN SERPL-MCNC: 25 MG/DL
CALCIUM SERPL-MCNC: 9.2 MG/DL
CALCIUM SERPL-MCNC: 9.4 MG/DL
CHLORIDE SERPL-SCNC: 103 MMOL/L
CHLORIDE SERPL-SCNC: 107 MMOL/L
CO2 SERPL-SCNC: 19 MMOL/L
CO2 SERPL-SCNC: 22 MMOL/L
CREAT SERPL-MCNC: 1.8 MG/DL
CREAT SERPL-MCNC: 1.9 MG/DL
DIASTOLIC DYSFUNCTION: NO
DIFFERENTIAL METHOD: ABNORMAL
EOSINOPHIL # BLD AUTO: 0.2 K/UL
EOSINOPHIL NFR BLD: 2.7 %
ERYTHROCYTE [DISTWIDTH] IN BLOOD BY AUTOMATED COUNT: 15.9 %
EST. GFR  (AFRICAN AMERICAN): 40.7 ML/MIN/1.73 M^2
EST. GFR  (AFRICAN AMERICAN): 43.4 ML/MIN/1.73 M^2
EST. GFR  (NON AFRICAN AMERICAN): 35.2 ML/MIN/1.73 M^2
EST. GFR  (NON AFRICAN AMERICAN): 37.6 ML/MIN/1.73 M^2
ESTIMATED AVG GLUCOSE: 174 MG/DL
ESTIMATED PA SYSTOLIC PRESSURE: 26.04
GLUCOSE SERPL-MCNC: 215 MG/DL
GLUCOSE SERPL-MCNC: 78 MG/DL
HBA1C MFR BLD HPLC: 7.7 %
HCT VFR BLD AUTO: 33.8 %
HGB BLD-MCNC: 10.1 G/DL
IMM GRANULOCYTES # BLD AUTO: 0.03 K/UL
IMM GRANULOCYTES NFR BLD AUTO: 0.4 %
LYMPHOCYTES # BLD AUTO: 0.9 K/UL
LYMPHOCYTES NFR BLD: 11.5 %
MAGNESIUM SERPL-MCNC: 1.6 MG/DL
MCH RBC QN AUTO: 27.3 PG
MCHC RBC AUTO-ENTMCNC: 29.9 G/DL
MCV RBC AUTO: 91 FL
MITRAL VALVE MOBILITY: NORMAL
MONOCYTES # BLD AUTO: 0.8 K/UL
MONOCYTES NFR BLD: 10.8 %
NEUTROPHILS # BLD AUTO: 5.5 K/UL
NEUTROPHILS NFR BLD: 73.7 %
NRBC BLD-RTO: 0 /100 WBC
PHOSPHATE SERPL-MCNC: 2.9 MG/DL
PLATELET # BLD AUTO: 280 K/UL
PMV BLD AUTO: 9.7 FL
POCT GLUCOSE: 131 MG/DL (ref 70–110)
POCT GLUCOSE: 183 MG/DL (ref 70–110)
POTASSIUM SERPL-SCNC: 4.2 MMOL/L
POTASSIUM SERPL-SCNC: 4.8 MMOL/L
PROT SERPL-MCNC: 6.7 G/DL
RBC # BLD AUTO: 3.7 M/UL
RETIRED EF AND QEF - SEE NOTES: 60 (ref 55–65)
SODIUM SERPL-SCNC: 133 MMOL/L
SODIUM SERPL-SCNC: 134 MMOL/L
TRICUSPID VALVE REGURGITATION: NORMAL
TROPONIN I SERPL DL<=0.01 NG/ML-MCNC: 0.11 NG/ML
WBC # BLD AUTO: 7.48 K/UL

## 2018-10-16 PROCEDURE — 90471 IMMUNIZATION ADMIN: CPT | Performed by: INTERNAL MEDICINE

## 2018-10-16 PROCEDURE — 90670 PCV13 VACCINE IM: CPT | Performed by: INTERNAL MEDICINE

## 2018-10-16 PROCEDURE — 93306 TTE W/DOPPLER COMPLETE: CPT

## 2018-10-16 PROCEDURE — G0378 HOSPITAL OBSERVATION PER HR: HCPCS

## 2018-10-16 PROCEDURE — 85025 COMPLETE CBC W/AUTO DIFF WBC: CPT

## 2018-10-16 PROCEDURE — 93010 ELECTROCARDIOGRAM REPORT: CPT | Mod: ,,, | Performed by: INTERNAL MEDICINE

## 2018-10-16 PROCEDURE — 63600175 PHARM REV CODE 636 W HCPCS: Performed by: PHYSICIAN ASSISTANT

## 2018-10-16 PROCEDURE — 25000003 PHARM REV CODE 250: Performed by: PHYSICIAN ASSISTANT

## 2018-10-16 PROCEDURE — 36415 COLL VENOUS BLD VENIPUNCTURE: CPT

## 2018-10-16 PROCEDURE — 84100 ASSAY OF PHOSPHORUS: CPT

## 2018-10-16 PROCEDURE — 83735 ASSAY OF MAGNESIUM: CPT

## 2018-10-16 PROCEDURE — 93306 TTE W/DOPPLER COMPLETE: CPT | Mod: 26,,, | Performed by: INTERNAL MEDICINE

## 2018-10-16 PROCEDURE — 63600175 PHARM REV CODE 636 W HCPCS: Performed by: INTERNAL MEDICINE

## 2018-10-16 PROCEDURE — 93005 ELECTROCARDIOGRAM TRACING: CPT

## 2018-10-16 PROCEDURE — 80053 COMPREHEN METABOLIC PANEL: CPT

## 2018-10-16 PROCEDURE — 99217 PR OBSERVATION CARE DISCHARGE: CPT | Mod: ,,, | Performed by: PHYSICIAN ASSISTANT

## 2018-10-16 PROCEDURE — 80048 BASIC METABOLIC PNL TOTAL CA: CPT

## 2018-10-16 PROCEDURE — G0009 ADMIN PNEUMOCOCCAL VACCINE: HCPCS | Performed by: INTERNAL MEDICINE

## 2018-10-16 PROCEDURE — 84484 ASSAY OF TROPONIN QUANT: CPT

## 2018-10-16 RX ADMIN — HEPARIN SODIUM 5000 UNITS: 5000 INJECTION, SOLUTION INTRAVENOUS; SUBCUTANEOUS at 05:10

## 2018-10-16 RX ADMIN — POLYETHYLENE GLYCOL 3350 17 G: 17 POWDER, FOR SOLUTION ORAL at 10:10

## 2018-10-16 RX ADMIN — PNEUMOCOCCAL 13-VALENT CONJUGATE VACCINE 0.5 ML: 2.2; 2.2; 2.2; 2.2; 2.2; 4.4; 2.2; 2.2; 2.2; 2.2; 2.2; 2.2; 2.2 INJECTION, SUSPENSION INTRAMUSCULAR at 05:10

## 2018-10-16 RX ADMIN — SENNOSIDES AND DOCUSATE SODIUM 1 TABLET: 8.6; 5 TABLET ORAL at 10:10

## 2018-10-16 RX ADMIN — INSULIN DETEMIR 22 UNITS: 100 INJECTION, SOLUTION SUBCUTANEOUS at 10:10

## 2018-10-16 RX ADMIN — HEPARIN SODIUM 5000 UNITS: 5000 INJECTION, SOLUTION INTRAVENOUS; SUBCUTANEOUS at 02:10

## 2018-10-16 RX ADMIN — TAMSULOSIN HYDROCHLORIDE 0.4 MG: 0.4 CAPSULE ORAL at 09:10

## 2018-10-16 RX ADMIN — SODIUM CHLORIDE 1000 ML: 0.9 INJECTION, SOLUTION INTRAVENOUS at 11:10

## 2018-10-16 RX ADMIN — INSULIN ASPART 6 UNITS: 100 INJECTION, SOLUTION INTRAVENOUS; SUBCUTANEOUS at 06:10

## 2018-10-16 RX ADMIN — INSULIN ASPART 6 UNITS: 100 INJECTION, SOLUTION INTRAVENOUS; SUBCUTANEOUS at 10:10

## 2018-10-16 RX ADMIN — ASPIRIN 81 MG: 81 TABLET, COATED ORAL at 09:10

## 2018-10-16 RX ADMIN — LIDOCAINE 1 PATCH: 50 PATCH TOPICAL at 09:10

## 2018-10-16 NOTE — PROGRESS NOTES
Reviewed AVS d/c instructions with patient, voices understanding. Telemetry d/ahmet. PIV d/ahmet with cath intact, gauze dressing applied. Waiting on mels w/c van transport to arrive.

## 2018-10-16 NOTE — ASSESSMENT & PLAN NOTE
Chronic kidney disease, stage III (moderate)  2.0 on admit (baseline 0.9-1)  - low flow IVFs  - UA negative  - avoid nephrotoxins

## 2018-10-16 NOTE — H&P
"[facility] Hospital Medicine  History & Physical    Patient Name: Saji Castañeda  MRN: [id number]  Admission Date: 10/15/2018  Attending Physician: Hermilo Maciel MD   Primary Care Provider: Serge Holland MD    [facility] Medicine Team: [facility] [place] Desmond Doyle PA-C     Patient information was obtained from patient and ER records.     Subjective:     Principal Problem:Atypical chest pain    Chief Complaint:   Chief Complaint   Patient presents with    Fever     seen by gen care dr told has ??pneumonia last week on antibiotics bladder infection, fever        HPI: Saji Castañeda  is a 69 yo M with PMH of HTN, HLD, T2DM on insulin, left foot OM s/p foot amputation, recent UTI treated with an unknown antibiotic who presents with complaints of right lateral chest pain and right paraspinal back pain that started about 2 weeks ago when he fell from his wheelchair when trying to stand up to urinate in his bathroom. He describes the pain as someone "sticking needles" into his chest.  Pain has become worse over the weekend and is aggravated with movement and palpation. He reports SOB since September that is worsened with exertion. Denies any exertional component to his chest pain. He was seen in the ED for recent UTI and discharged but reports little resolution of symptoms. Patient lives with his two sisters and was brought to the ED by his nephew.     ED: EKG showed NSR, rate 88. Tn 0.052, , CXR/RIB series negative for fractures, acute lung infiltrates or evidence of pulmonary edema.  AST 76, ALT 76, Cr 2.0 (up from baseline of 1.1).     Past Medical History:   Diagnosis Date    Arthritis     legs    Diabetes mellitus     Diabetes mellitus, type 2     Hyperlipidemia     Osteomyelitis        Past Surgical History:   Procedure Laterality Date    ANGIOGRAM-EXTREMITY-LOWER Left 5/29/2014    Performed by [facility]ett [facility]aga [facility] at [facility]    APPLICATION, GRAFT, SKIN, " "SPLIT-THICKNESS Left 2/19/2014    Performed by Lissett Ar[facility]aga MD at [facility] OR 2ND [facility]LR    APPLICATION, GRAFT, SKIN, SPLIT-THICKNESS Left 1/15/2013    Performed by David Gandhi MD at [facility] OR [facility]ND [facility]    DEBRIDEMENT-WOUND Left 5/29/2014    Performed by Lissett Arteaga MD at [facility] OR [facility]    FOOT AMPUTATION  October 2010    left high midfoot amputation    INSERTION-INTRAOCULAR LENS (IOL) Right 9/5/2017    Performed by Epi Mccarty MD at [facility]    PHACOEMULSIFICATION-ASPIRATION-CATARACT Right 9/5/2017    Performed by Epi Mccarty MD at [facility] OR    PHACOEMULSIFICATION-ASPIRATION-CATARACT Left 6/13/2017    Performed by Epi Mccarty MD at [facility] OR       Review of patient's allergies indicates:  No Known Allergies    No current facility-administered medications on file prior to encounter.      Current Outpatient Medications on File Prior to Encounter   Medication Sig    atorvastatin (LIPITOR) 20 MG tablet TK 1 T PO QD    insulin detemir (LEVEMIR FLEXTOUCH) 100 unit/mL (3 mL) SubQ InPn pen Inject 25 units twice a day, need appt.    JANUVIA 100 mg Tab TAKE 1 TABLET BY MOUTH ONCE DAILY    metformin (GLUCOPHAGE) 500 MG tablet Take 2 tablets (1,000 mg total) by mouth 2 (two) times daily with meals.    sitagliptin (JANUVIA) 100 MG Tab Take 1 tablet (100 mg total) by mouth once daily.    tamsulosin (FLOMAX) 0.4 mg Cp24 Take 0.4 mg by mouth once daily.    ammonium lactate (LAC-HYDRIN) 12 % lotion     aspirin (ECOTRIN) 81 MG EC tablet Take 81 mg by mouth once daily.    atorvastatin (LIPITOR) 40 MG tablet Take 40 mg by mouth every evening.     BD ULTRA-FINE ADITYA PEN NEEDLE 32 gauge x 5/32" Ndle USE FOUR TIMES DAILY WITH MEALS AND NIGHTLY    BESIVANCE 0.6 % DrpS INSTILL 1 DROP INTO THE OS TID    blood sugar diagnostic (CONTOUR TEST STRIPS) Strp Inject 1 strip into the skin 2 (two) times daily before meals.    blood sugar diagnostic Strp True metrix  Strips to be tested 2 times daily    DUREZOL " 0.05 % Drop ophthalmic solution INSTILL 1 DROP INTO THE OS QID    econazole nitrate 1 % cream APPLY EXTERNALLY TO THE AFFECTED AREA ONCE DAILY    ibuprofen (ADVIL,MOTRIN) 600 MG tablet Take 1 tablet (600 mg total) by mouth every 6 (six) hours as needed.    ILEVRO 0.3 % DrpS INSTILL 1 DROP INTO THE OS QD    losartan (COZAAR) 50 MG tablet     MICROLET LANCET Misc     triamcinolone acetonide 0.1% (KENALOG) 0.1 % cream APPLY EXTERNALLY TO THE AFFECTED AREA TWICE DAILY AS DIRECTED     Family History     Problem Relation (Age of Onset)    Cancer Brother    Diabetes Mother, Sister    Heart disease Mother    Stroke Sister        Tobacco Use    Smoking status: Never Smoker    Smokeless tobacco: Never Used   Substance and Sexual Activity    Alcohol use: No     Comment: occassional    Drug use: No    Sexual activity: Not Currently     Review of Systems   Constitutional: Positive for fever.   HENT: Positive for sinus pressure. Negative for facial swelling, trouble swallowing and voice change.    Eyes: Negative for photophobia and visual disturbance.   Respiratory: Positive for cough (greensh sputum) and shortness of breath.    Cardiovascular: Positive for chest pain and leg swelling. Negative for palpitations.   Gastrointestinal: Positive for nausea. Negative for abdominal pain and constipation.   Genitourinary: Positive for dysuria. Negative for difficulty urinating.   Musculoskeletal: Positive for gait problem. Negative for back pain.   Neurological: Positive for weakness. Negative for dizziness, tremors, light-headedness and headaches.   Psychiatric/Behavioral: Negative for agitation and behavioral problems.     Objective:     Vital Signs (Most Recent):  Temp: 98.4 °F (36.9 °C) (10/15/18 0911)  Pulse: 62 (10/15/18 1746)  Resp: 17 (10/15/18 1037)  BP: 131/65 (10/15/18 1746)  SpO2: 97 % (10/15/18 1746) Vital Signs (24h Range):  Temp:  [98.4 °F (36.9 °C)] 98.4 °F (36.9 °C)  Pulse:  [62-90] 62  Resp:  [17-18]  17  SpO2:  [96 %-97 %] 97 %  BP: (113-147)/(58-69) 131/65     Weight: 117.9 kg (260 lb)  Body mass index is 38.4 kg/m².    Physical Exam   Constitutional: He is oriented to person, place, and time. He appears well-nourished. No distress.   HENT:   Head: Normocephalic.   Eyes: EOM are normal.   Neck: Normal range of motion.   Cardiovascular: Normal rate and regular rhythm.   Pulmonary/Chest: Effort normal and breath sounds normal. No respiratory distress. He has no wheezes. He exhibits no tenderness.   Abdominal: Soft. Bowel sounds are normal. He exhibits no distension. There is tenderness.   Musculoskeletal: Normal range of motion. He exhibits edema (3+pitting) and tenderness.   L BKA   Neurological: He is alert and oriented to person, place, and time.   Skin: Skin is warm and dry.   Psychiatric: He has a normal mood and affect. His behavior is normal.   Nursing note and vitals reviewed.        CRANIAL NERVES     CN III, IV, VI   Extraocular motions are normal.        Significant Labs: All pertinent labs within the past 24 hours have been reviewed.    Significant Imaging: I have reviewed all pertinent imaging results/findings within the past 24 hours.    Assessment/Plan:     * Atypical chest pain    68 y.o. who presents with atypical chest pain and R chest wall pain that is reproducible on exam.      - EKG without ST-segment elevation or depression, new left bundle branch block\cb3   - troponin 0.052-->0.050   - cards consulted in ED and do not believe pain of cardiac origin   - CXR demonstrating borderline cardiomegaly, no acute process;    - 2D ECHO ordered for further evaluation   - D.dimer 2.47--- CTA within limitations (pt motion), no evidence of pulmonary embolism into the lobar pulmonary arteries or evidence to suggest pulmonary infarction  - CBC, CMP (goal Mag>2, K>4) daily; lipid panel ordered  - PRN anti-inflammatories  - cardiac telemetry        YNES (acute kidney injury)    Chronic kidney disease,  stage III (moderate)  2.0 on admit (baseline 0.9-1)  - low flow IVFs  - UA negative  - avoid nephrotoxins         Anemia    Chronic  - denies active bleeding  - iron studies ordered        Type II diabetes mellitus with complication, uncontrolled    - DM diet  - Home regimen: insulin detemir 30u AM, 26u PM  - Hospital regimen: insulin determir 22u BID; aspart 6u TIDWM  - will hold JANUVIA 100 mg Tab, metformin (GLUCOPHAGE) 500 MG tablet          VTE Risk Mitigation (From admission, onward)        Ordered     heparin (porcine) injection 5,000 Units  Every 8 hours      10/15/18 1750     IP VTE HIGH RISK PATIENT  Once      10/15/18 1750             Desmond Doyle PA-C  Department of Hospital Medicine   [facility]

## 2018-10-16 NOTE — ASSESSMENT & PLAN NOTE
68 y.o. who presents with atypical chest pain and R chest wall pain that is reproducible on exam.      - EKG without ST-segment elevation or depression, new left bundle branch block\cb3   - troponin 0.052-->0.050   - cards consulted in ED and do not believe pain of cardiac origin   - CXR demonstrating borderline cardiomegaly, no acute process;    - 2D ECHO ordered for further evaluation   - D.dimer 2.47--- CTA within limitations (pt motion), no evidence of pulmonary embolism into the lobar pulmonary arteries or evidence to suggest pulmonary infarction  - CBC, CMP (goal Mag>2, K>4) daily; lipid panel ordered  - PRN anti-inflammatories  - cardiac telemetry

## 2018-10-16 NOTE — PLAN OF CARE
LEW was informed by [facility]'s transportation that the WC van transportation has been delayed until 1800. LEW informed the patient's nurse, Ma ([phone]), of above. Will continue to follow.

## 2018-10-16 NOTE — PLAN OF CARE
Problem: Patient Care Overview  Goal: Plan of Care Review  Outcome: Ongoing (interventions implemented as appropriate)  Report received, care assumed. Patient arrived to the unit per stretcher, was able to assist w transfer from stretcher to bed w assist of 1. Unit routines  Safety:  call light in reach, patient oriented to room & instructed how to notify nurse if assistance is needed, questions & concerns addressed, bed in lowest position with wheels locked & side rails up X 2, fall precautions followed, patient free from fall & injury thus far this shift;  VTE/bleeding precautions maintained.  Activity:  patient up with assistance, weight shifted at least every other hour.  Neurological:  patient A&O X 4, follows commands, equal  strength & dorsi/plantarflexion, neuro checks performed  & WDL.  Respiratory:  patient tolerates room air without distress, denies SOB.  Cardiac:  Denies chest pain, BP stable.  HR stable.  NSR on telemetry.  Afebrile this shift.  GI:  Patient tolerates PO intake well, denies nausea, LBM 10/14/18.  :  patient voids clear yellow urine without foul odor spontaneously & without difficulty, adequate output for shift.  Skin:  CDI.  Devices:  PIV CDI, negative for s/sx of infection & infiltration.  Pain:  patient's pain controlled w lidocaine patch.  Will continue to monitor patient.

## 2018-10-16 NOTE — HPI
"Saji Castañeda  is a 67 yo M with PMH of HTN, HLD, T2DM on insulin, left foot OM s/p foot amputation, recent UTI treated with an unknown antibiotic who presents with complaints of right lateral chest pain and right paraspinal back pain that started about 2 weeks ago when he fell from his wheelchair when trying to stand up to urinate in his bathroom. He describes the pain as someone "sticking needles" into his chest.  Pain has become worse over the weekend and is aggravated with movement and palpation. He reports SOB since September that is worsened with exertion. Denies any exertional component to his chest pain. He was seen in the ED for recent UTI and discharged but reports little resolution of symptoms. Patient lives with his two sisters and was brought to the ED by his nephew.     ED: EKG showed NSR, rate 88. Tn 0.052, , CXR/RIB series negative for fractures, acute lung infiltrates or evidence of pulmonary edema.  AST 76, ALT 76, Cr 2.0 (up from baseline of 1.1).   "

## 2018-10-16 NOTE — PROGRESS NOTES
Patient noticed to have occasional PVCs on the monotor, asymptomatic, Most recent Potassium WNLs., troponin elavated. MDs are aware of the lab values. Will continue to monitor.

## 2018-10-16 NOTE — PLAN OF CARE
10/16/18 1135   Discharge Assessment   Assessment Type Discharge Planning Assessment   Confirmed/corrected address and phone number on facesheet? Yes   Assessment information obtained from? Patient   Expected Length of Stay (days) 2   Communicated expected length of stay with patient/caregiver yes   Prior to hospitilization cognitive status: Alert/Oriented   Prior to hospitalization functional status: Assistive Equipment   Current cognitive status: Alert/Oriented   Current Functional Status: Needs Assistance   Lives With sibling(s)  (2 sisters, Mc Silva ([phone]), & January Nicholson ([phone]))   Able to Return to Prior Arrangements yes   Is patient able to care for self after discharge? Yes   Patient's perception of discharge disposition home or selfcare   Readmission Within The Last 30 Days no previous admission in last 30 days   Patient currently being followed by outpatient case management? No   Patient currently receives any other outside agency services? No   Equipment Currently Used at Home walker, rolling;wheelchair;glucometer;other (see comments)  (left boot for amputated foot)   Do you have any problems affording any of your prescribed medications? No   Is the patient taking medications as prescribed? yes   Does the patient have transportation home? No   Does the patient receive services at the [facility]? No   Discharge Plan A Home with family   Discharge Plan B Home Health   Patient/Family In Agreement With Plan yes     Dx: chest pain  Consult: mitzi  Pharm: [facility] f/u appt: Dr. Lalo Heath (PCP) on 10/18/18 at 0900

## 2018-10-16 NOTE — ED NOTES
Telemetry Verification   Patient placed on Telemetry Box  Verified with War Room  Box # [id number]   Monitor Tech Tuyet   Rate 64   Rhythm NSR

## 2018-10-16 NOTE — PLAN OF CARE
LEW was informed by JOHNNY Ruiz that the patient will discharge home today. [facility]  van transportation arranged by this CM for pickup at 1730. LEW informed the patient's nurse, Ma ([phone]), of above. Will continue to follow

## 2018-10-17 NOTE — ASSESSMENT & PLAN NOTE
68 y.o. who presents with atypical chest pain and R chest wall pain that is reproducible on exam.      - EKG without ST-segment elevation or depression, new left bundle branch block\cb3   - troponin 0.052-->0.050-->0.10   - cards consulted in ED and do not believe pain of cardiac origin    - repeat EKG remains unchanged, no ST changes   - cardiology reevaluated, not ischemic chest pain  - CXR demonstrating borderline cardiomegaly, no acute process;    - 2D ECHO unremarkable  - D.dimer 2.47--- CTA within limitations (pt motion), no evidence of pulmonary embolism into the lobar pulmonary arteries or evidence to suggest pulmonary infarction  - CBC, CMP (goal Mag>2, K>4) daily; lipid panel ordered  - PRN anti-inflammatories  - cardiac telemetry

## 2018-10-17 NOTE — DISCHARGE SUMMARY
"[facility] Hospital Medicine  Discharge Summary      Patient Name: Saji Castañeda  MRN: [id number]  Admission Date: 10/15/2018  Hospital Length of Stay: 0 days  Discharge Date and Time: 10/16/2018  7:45 PM  Attending Physician: No att. providers found   Discharging Provider: Moise Ruiz PA-C  Primary Care Provider: Lalo Heath MD  [facility] Medicine Team: [facility] MED E Moise Ruiz PA-C    HPI:   Saji Castañeda  is a 69 yo M with PMH of HTN, HLD, T2DM on insulin, left foot OM s/p foot amputation, recent UTI treated with an unknown antibiotic who presents with complaints of right lateral chest pain and right paraspinal back pain that started about 2 weeks ago when he fell from his wheelchair when trying to stand up to urinate in his bathroom. He describes the pain as someone "sticking needles" into his chest.  Pain has become worse over the weekend and is aggravated with movement and palpation. He reports SOB since September that is worsened with exertion. Denies any exertional component to his chest pain. He was seen in the ED for recent UTI and discharged but reports little resolution of symptoms. Patient lives with his two sisters and was brought to the ED by his nephew.     ED: EKG showed NSR, rate 88. Tn 0.052, , CXR/RIB series negative for fractures, acute lung infiltrates or evidence of pulmonary edema.  AST 76, ALT 76, Cr 2.0 (up from baseline of 1.1).     * No surgery found *      Hospital Course:   Patient was admitted to observation for atypical chest pain. Cardiology consulted, evaluated elevated troponin (0.05->0.10) with no ischemic changes on EKG and determined chest pain is related to fall and likely musculoskeletal. Patient YNES improved with fluids 2.0-->1.8, patient instructed to hold Losartan and Metformin until 10/18/18 and repeat BMP in 1 week. Patient stable for discharge.     Consults:   Consults (From admission, onward)        Status Ordering " Provider     Inpatient consult to Cardiology  Once     Provider:  (Not yet assigned)    Completed MYRIAM WEISS     IP consult to case management  Once     Provider:  (Not yet assigned)    Completed CORWIN CUMMINS          * Atypical chest pain    68 y.o. who presents with atypical chest pain and R chest wall pain that is reproducible on exam.      - EKG without ST-segment elevation or depression, new left bundle branch block\cb3   - troponin 0.052-->0.050-->0.10   - cards consulted in ED and do not believe pain of cardiac origin    - repeat EKG remains unchanged, no ST changes   - cardiology reevaluated, not ischemic chest pain  - CXR demonstrating borderline cardiomegaly, no acute process;    - 2D ECHO unremarkable  - D.dimer 2.47--- CTA within limitations (pt motion), no evidence of pulmonary embolism into the lobar pulmonary arteries or evidence to suggest pulmonary infarction  - CBC, CMP (goal Mag>2, K>4) daily; lipid panel ordered  - PRN anti-inflammatories  - cardiac telemetry        Anemia    Chronic  - denies active bleeding        YNES (acute kidney injury)    Chronic kidney disease, stage III (moderate)  2.0 on admit (baseline 0.9-1)  - low flow IVFs  - UA negative  - avoid nephrotoxins  - improved with fluids, Cr 1.8  - repeat BMP in 1 week        Type II diabetes mellitus with complication, uncontrolled    - DM diet  - Home regimen: insulin detemir 30u AM, 26u PM  - Hospital regimen: insulin determir 22u BID; aspart 6u TIDWM  - will hold JANUVIA 100 mg Tab, metformin (GLUCOPHAGE) 500 MG tablet  - resume metformin on 10/18/18          Final Active Diagnoses:    Diagnosis Date Noted POA    PRINCIPAL PROBLEM:  Atypical chest pain [R07.89] 10/15/2018 Yes    YNES (acute kidney injury) [N17.9] 10/15/2018 Yes    Chronic kidney disease, stage III (moderate) [N18.3] 10/15/2018 Yes    Anemia [D64.9] 10/15/2018 Yes    Type II diabetes mellitus with complication, uncontrolled [E11.8, E11.65] 04/21/2016  Yes      Problems Resolved During this Admission:       Discharged Condition: good    Disposition: Home or Self Care    Follow Up:  Follow-up Information     Lalo Heath MD On 10/18/2018.    Specialty:  Internal Medicine  Why:  at 9:00AM; [facility] to provide transportation  Contact information:  [address]  [facility]  [place] [address]  [phone]                 Patient Instructions:      Basic metabolic panel   Standing Status: Future Standing Exp. Date: 12/15/19     Diet diabetic     Diet Cardiac     Notify your health care provider if you experience any of the following:  temperature >100.4     Notify your health care provider if you experience any of the following:  persistent nausea and vomiting or diarrhea     Notify your health care provider if you experience any of the following:  severe uncontrolled pain     Notify your health care provider if you experience any of the following:  difficulty breathing or increased cough     Activity as tolerated       Significant Diagnostic Studies: Labs:   BMP:   Recent Labs   Lab  10/15/18   1036  10/16/18   0341  10/16/18   1448   GLU  109  78  215*   NA  135*  134*  133*   K  4.1  4.8  4.2   CL  102  107  103   CO2  26  19*  22*   BUN  29*  25*  23   CREATININE  2.0*  1.9*  1.8*   CALCIUM  9.7  9.4  9.2   MG   --   1.6   --    , Troponin   Recent Labs   Lab  10/16/18   1122   TROPONINI  0.106*    and All labs within the past 24 hours have been reviewed  Cardiac Graphics: Echocardiogram:   2D echo with color flow doppler:   Results for orders placed or performed during the hospital encounter of 10/15/18   2D echo with color flow doppler   Result Value Ref Range    EF 60 55 - 65    Diastolic Dysfunction No     Aortic Valve Regurgitation TRIVIAL     Est. PA Systolic Pressure 26.04     Mitral Valve Mobility NORMAL     Tricuspid Valve Regurgitation TRIVIAL        Pending Diagnostic Studies:     None         Medications:  Reconciled Home  "Medications:      Medication List      CONTINUE taking these medications    ammonium lactate 12 % lotion  Commonly known as:  LAC-HYDRIN     aspirin 81 MG EC tablet  Commonly known as:  ECOTRIN  Take 81 mg by mouth once daily.     * atorvastatin 40 MG tablet  Commonly known as:  LIPITOR  Take 40 mg by mouth every evening.     * atorvastatin 20 MG tablet  Commonly known as:  LIPITOR  TK 1 T PO QD     BD ULTRA-FINE ADITYA PEN NEEDLE 32 gauge x 5/32" Ndle  Generic drug:  pen needle, diabetic  USE FOUR TIMES DAILY WITH MEALS AND NIGHTLY     BESIVANCE 0.6 % Drps  Generic drug:  besifloxacin  INSTILL 1 DROP INTO THE OS TID     * blood sugar diagnostic Strp  Commonly known as:  CONTOUR TEST STRIPS  Inject 1 strip into the skin 2 (two) times daily before meals.     * blood sugar diagnostic Strp  True metrix  Strips to be tested 2 times daily     DUREZOL 0.05 % Drop ophthalmic solution  Generic drug:  difluprednate  INSTILL 1 DROP INTO THE OS QID     econazole nitrate 1 % cream  APPLY EXTERNALLY TO THE AFFECTED AREA ONCE DAILY     ILEVRO 0.3 % Drps  Generic drug:  nepafenac  INSTILL 1 DROP INTO THE OS QD     insulin detemir U-100 100 unit/mL (3 mL) Inpn pen  Commonly known as:  LEVEMIR FLEXTOUCH U-100 INSULN  Inject 25 units twice a day, need appt.     losartan 50 MG tablet  Commonly known as:  COZAAR     metFORMIN 500 MG tablet  Commonly known as:  GLUCOPHAGE  Take 2 tablets (1,000 mg total) by mouth 2 (two) times daily with meals.     MICROLET LANCET Misc  Generic drug:  lancets     * SITagliptin 100 MG Tab  Commonly known as:  JANUVIA  Take 1 tablet (100 mg total) by mouth once daily.     * JANUVIA 100 MG Tab  Generic drug:  SITagliptin  TAKE 1 TABLET BY MOUTH ONCE DAILY     tamsulosin 0.4 mg Cap  Commonly known as:  FLOMAX  Take 0.4 mg by mouth once daily.     triamcinolone acetonide 0.1% 0.1 % cream  Commonly known as:  KENALOG  APPLY EXTERNALLY TO THE AFFECTED AREA TWICE DAILY AS DIRECTED         * This list has 6 " medication(s) that are the same as other medications prescribed for you. Read the directions carefully, and ask your doctor or other care provider to review them with you.            STOP taking these medications    ibuprofen 600 MG tablet  Commonly known as:  ADVIL,MOTRIN            Indwelling Lines/Drains at time of discharge:   Lines/Drains/Airways     Peripherally Inserted Central Catheter Line                 PICC Double Lumen 06/19/14 1639 right brachial 1580 days          Drain                 Closed/Suction Drain Left Foot Other (Comment) 1700 days          Airway                 Airway - Non-Surgical 06/13/17 1422 490 days         Airway - Non-Surgical 09/05/17 1230 Nasal Cannula 406 days                Time spent on the discharge of patient: 37 minutes  Patient was seen and examined on the date of discharge and determined to be suitable for discharge.         Moise Ruiz PA-C  Department of Hospital Medicine  [facility]

## 2018-10-17 NOTE — HOSPITAL COURSE
Patient was admitted to observation for atypical chest pain. Cardiology consulted, evaluated elevated troponin (0.05->0.10) with no ischemic changes on EKG and determined chest pain is related to fall and likely musculoskeletal. Patient NYES improved with fluids 2.0-->1.8, patient instructed to hold Losartan and Metformin until 10/18/18 and repeat BMP in 1 week. Patient stable for discharge.

## 2018-10-17 NOTE — ASSESSMENT & PLAN NOTE
Chronic kidney disease, stage III (moderate)  2.0 on admit (baseline 0.9-1)  - low flow IVFs  - UA negative  - avoid nephrotoxins  - improved with fluids, Cr 1.8  - repeat BMP in 1 week

## 2018-10-17 NOTE — PLAN OF CARE
10/17/18 0555   Final Note   Assessment Type Final Discharge Note     Patient discharged home on 10/16/18. [facility]'s  van transportation arranged by LEW Tavarez, BEREKET/LEW. Discharge summary faxed to Dr. Lalo Heath (PCP at [facility]) f [phone].

## 2018-10-17 NOTE — ASSESSMENT & PLAN NOTE
- DM diet  - Home regimen: insulin detemir 30u AM, 26u PM  - Hospital regimen: insulin determir 22u BID; aspart 6u TIDWM  - will hold JANUVIA 100 mg Tab, metformin (GLUCOPHAGE) 500 MG tablet  - resume metformin on 10/18/18

## 2018-12-07 RX ORDER — CALCIUM CITRATE/VITAMIN D3 200MG-6.25
TABLET ORAL
Qty: 100 STRIP | Refills: 6 | Status: ON HOLD | OUTPATIENT
Start: 2018-12-07 | End: 2020-12-14 | Stop reason: ALTCHOICE

## 2019-04-22 RX ORDER — PEN NEEDLE, DIABETIC 31 GX5/16"
NEEDLE, DISPOSABLE MISCELLANEOUS
Qty: 100 EACH | Refills: 0 | Status: SHIPPED | OUTPATIENT
Start: 2019-04-22 | End: 2019-06-04 | Stop reason: SDUPTHER

## 2019-06-04 RX ORDER — PEN NEEDLE, DIABETIC 31 GX5/16"
NEEDLE, DISPOSABLE MISCELLANEOUS
Qty: 100 EACH | Refills: 0 | Status: SHIPPED | OUTPATIENT
Start: 2019-06-04 | End: 2019-08-29 | Stop reason: SDUPTHER

## 2019-08-29 RX ORDER — PEN NEEDLE, DIABETIC 31 GX5/16"
NEEDLE, DISPOSABLE MISCELLANEOUS
Qty: 100 EACH | Refills: 0 | Status: SHIPPED | OUTPATIENT
Start: 2019-08-29 | End: 2019-11-20 | Stop reason: SDUPTHER

## 2019-10-30 RX ORDER — INSULIN DETEMIR 100 [IU]/ML
INJECTION, SOLUTION SUBCUTANEOUS
Qty: 15 ML | Refills: 0 | Status: ON HOLD | OUTPATIENT
Start: 2019-10-30 | End: 2020-12-14

## 2019-11-20 RX ORDER — PEN NEEDLE, DIABETIC 31 GX5/16"
NEEDLE, DISPOSABLE MISCELLANEOUS
Qty: 100 EACH | Refills: 0 | Status: ON HOLD | OUTPATIENT
Start: 2019-11-20

## 2019-12-04 ENCOUNTER — TELEPHONE (OUTPATIENT)
Dept: VASCULAR SURGERY | Facility: CLINIC | Age: 69
End: 2019-12-04

## 2019-12-04 ENCOUNTER — HOSPITAL ENCOUNTER (OUTPATIENT)
Facility: HOSPITAL | Age: 69
Discharge: HOME | DRG: 638 | End: 2019-12-06
Attending: EMERGENCY MEDICINE | Admitting: INTERNAL MEDICINE
Payer: MEDICARE

## 2019-12-04 DIAGNOSIS — I44.1 MOBITZ TYPE 1 SECOND DEGREE AV BLOCK: ICD-10-CM

## 2019-12-04 DIAGNOSIS — L97.509 FOOT ULCER: ICD-10-CM

## 2019-12-04 DIAGNOSIS — I44.1 SECOND DEGREE AV BLOCK, MOBITZ TYPE I: ICD-10-CM

## 2019-12-04 DIAGNOSIS — I10 ESSENTIAL HYPERTENSION: ICD-10-CM

## 2019-12-04 DIAGNOSIS — E11.621 ISCHEMIC ULCER DIABETIC FOOT: Primary | ICD-10-CM

## 2019-12-04 DIAGNOSIS — L97.509 ISCHEMIC ULCER DIABETIC FOOT: Primary | ICD-10-CM

## 2019-12-04 DIAGNOSIS — L98.499 ISCHEMIC ULCER, UNSPECIFIED ULCER STAGE: Primary | ICD-10-CM

## 2019-12-04 PROBLEM — D63.8 ANEMIA OF CHRONIC DISEASE: Status: ACTIVE | Noted: 2018-10-15

## 2019-12-04 LAB
ALBUMIN SERPL BCP-MCNC: 3.1 G/DL (ref 3.5–5.2)
ALP SERPL-CCNC: 72 U/L (ref 55–135)
ALT SERPL W/O P-5'-P-CCNC: 22 U/L (ref 10–44)
ANION GAP SERPL CALC-SCNC: 10 MMOL/L (ref 8–16)
AST SERPL-CCNC: 23 U/L (ref 10–40)
BASOPHILS # BLD AUTO: 0.04 K/UL (ref 0–0.2)
BASOPHILS NFR BLD: 0.6 % (ref 0–1.9)
BILIRUB SERPL-MCNC: 0.3 MG/DL (ref 0.1–1)
BUN SERPL-MCNC: 19 MG/DL (ref 8–23)
CALCIUM SERPL-MCNC: 9.2 MG/DL (ref 8.7–10.5)
CHLORIDE SERPL-SCNC: 105 MMOL/L (ref 95–110)
CO2 SERPL-SCNC: 26 MMOL/L (ref 23–29)
CREAT SERPL-MCNC: 1.3 MG/DL (ref 0.5–1.4)
DIFFERENTIAL METHOD: ABNORMAL
EOSINOPHIL # BLD AUTO: 0.3 K/UL (ref 0–0.5)
EOSINOPHIL NFR BLD: 3.6 % (ref 0–8)
ERYTHROCYTE [DISTWIDTH] IN BLOOD BY AUTOMATED COUNT: 15.2 % (ref 11.5–14.5)
EST. GFR  (AFRICAN AMERICAN): >60 ML/MIN/1.73 M^2
EST. GFR  (NON AFRICAN AMERICAN): 55.3 ML/MIN/1.73 M^2
GLUCOSE SERPL-MCNC: 132 MG/DL (ref 70–110)
HCT VFR BLD AUTO: 35.9 % (ref 40–54)
HGB BLD-MCNC: 10.8 G/DL (ref 14–18)
IMM GRANULOCYTES # BLD AUTO: 0.01 K/UL (ref 0–0.04)
IMM GRANULOCYTES NFR BLD AUTO: 0.1 % (ref 0–0.5)
LYMPHOCYTES # BLD AUTO: 2 K/UL (ref 1–4.8)
LYMPHOCYTES NFR BLD: 28.4 % (ref 18–48)
MCH RBC QN AUTO: 27.7 PG (ref 27–31)
MCHC RBC AUTO-ENTMCNC: 30.1 G/DL (ref 32–36)
MCV RBC AUTO: 92 FL (ref 82–98)
MONOCYTES # BLD AUTO: 0.7 K/UL (ref 0.3–1)
MONOCYTES NFR BLD: 10.4 % (ref 4–15)
NEUTROPHILS # BLD AUTO: 4.1 K/UL (ref 1.8–7.7)
NEUTROPHILS NFR BLD: 56.9 % (ref 38–73)
NRBC BLD-RTO: 0 /100 WBC
PLATELET # BLD AUTO: 261 K/UL (ref 150–350)
PMV BLD AUTO: 10.1 FL (ref 9.2–12.9)
POCT GLUCOSE: 140 MG/DL (ref 70–110)
POTASSIUM SERPL-SCNC: 4.7 MMOL/L (ref 3.5–5.1)
PROT SERPL-MCNC: 7.4 G/DL (ref 6–8.4)
RBC # BLD AUTO: 3.9 M/UL (ref 4.6–6.2)
SODIUM SERPL-SCNC: 141 MMOL/L (ref 136–145)
WBC # BLD AUTO: 7.14 K/UL (ref 3.9–12.7)

## 2019-12-04 PROCEDURE — 99285 EMERGENCY DEPT VISIT HI MDM: CPT | Mod: 25

## 2019-12-04 PROCEDURE — 11000001 HC ACUTE MED/SURG PRIVATE ROOM

## 2019-12-04 PROCEDURE — 99223 1ST HOSP IP/OBS HIGH 75: CPT | Mod: ,,, | Performed by: INTERNAL MEDICINE

## 2019-12-04 PROCEDURE — 85025 COMPLETE CBC W/AUTO DIFF WBC: CPT

## 2019-12-04 PROCEDURE — G0378 HOSPITAL OBSERVATION PER HR: HCPCS

## 2019-12-04 PROCEDURE — 99223 PR INITIAL HOSPITAL CARE,LEVL III: ICD-10-PCS | Mod: ,,, | Performed by: INTERNAL MEDICINE

## 2019-12-04 PROCEDURE — 99285 PR EMERGENCY DEPT VISIT,LEVEL V: ICD-10-PCS | Mod: ,,, | Performed by: PHYSICIAN ASSISTANT

## 2019-12-04 PROCEDURE — 99285 EMERGENCY DEPT VISIT HI MDM: CPT | Mod: ,,, | Performed by: PHYSICIAN ASSISTANT

## 2019-12-04 PROCEDURE — 80053 COMPREHEN METABOLIC PANEL: CPT

## 2019-12-04 PROCEDURE — 82962 GLUCOSE BLOOD TEST: CPT

## 2019-12-04 RX ORDER — ENOXAPARIN SODIUM 100 MG/ML
40 INJECTION SUBCUTANEOUS EVERY 24 HOURS
Status: DISCONTINUED | OUTPATIENT
Start: 2019-12-05 | End: 2019-12-06 | Stop reason: HOSPADM

## 2019-12-04 RX ORDER — SODIUM CHLORIDE 0.9 % (FLUSH) 0.9 %
10 SYRINGE (ML) INJECTION
Status: DISCONTINUED | OUTPATIENT
Start: 2019-12-04 | End: 2019-12-04

## 2019-12-04 RX ORDER — TALC
6 POWDER (GRAM) TOPICAL NIGHTLY PRN
Status: DISCONTINUED | OUTPATIENT
Start: 2019-12-04 | End: 2019-12-06 | Stop reason: HOSPADM

## 2019-12-04 RX ORDER — LOSARTAN POTASSIUM 25 MG/1
50 TABLET ORAL DAILY
Status: DISCONTINUED | OUTPATIENT
Start: 2019-12-05 | End: 2019-12-06 | Stop reason: HOSPADM

## 2019-12-04 RX ORDER — OXYCODONE HYDROCHLORIDE 5 MG/1
5 TABLET ORAL EVERY 6 HOURS PRN
Status: DISCONTINUED | OUTPATIENT
Start: 2019-12-04 | End: 2019-12-06 | Stop reason: HOSPADM

## 2019-12-04 RX ORDER — ATORVASTATIN CALCIUM 20 MG/1
40 TABLET, FILM COATED ORAL NIGHTLY
Status: DISCONTINUED | OUTPATIENT
Start: 2019-12-04 | End: 2019-12-06 | Stop reason: HOSPADM

## 2019-12-04 RX ORDER — ASPIRIN 81 MG/1
81 TABLET ORAL DAILY
Status: DISCONTINUED | OUTPATIENT
Start: 2019-12-05 | End: 2019-12-06 | Stop reason: HOSPADM

## 2019-12-04 RX ORDER — IPRATROPIUM BROMIDE AND ALBUTEROL SULFATE 2.5; .5 MG/3ML; MG/3ML
3 SOLUTION RESPIRATORY (INHALATION) EVERY 6 HOURS PRN
Status: DISCONTINUED | OUTPATIENT
Start: 2019-12-04 | End: 2019-12-06 | Stop reason: HOSPADM

## 2019-12-04 RX ORDER — ACETAMINOPHEN 325 MG/1
650 TABLET ORAL EVERY 4 HOURS PRN
Status: DISCONTINUED | OUTPATIENT
Start: 2019-12-04 | End: 2019-12-06 | Stop reason: HOSPADM

## 2019-12-04 RX ORDER — ONDANSETRON 8 MG/1
8 TABLET, ORALLY DISINTEGRATING ORAL EVERY 8 HOURS PRN
Status: DISCONTINUED | OUTPATIENT
Start: 2019-12-04 | End: 2019-12-06 | Stop reason: HOSPADM

## 2019-12-04 RX ORDER — TAMSULOSIN HYDROCHLORIDE 0.4 MG/1
0.4 CAPSULE ORAL DAILY
Status: DISCONTINUED | OUTPATIENT
Start: 2019-12-05 | End: 2019-12-06 | Stop reason: HOSPADM

## 2019-12-04 RX ORDER — AMOXICILLIN 250 MG
1 CAPSULE ORAL 2 TIMES DAILY PRN
Status: DISCONTINUED | OUTPATIENT
Start: 2019-12-04 | End: 2019-12-06 | Stop reason: HOSPADM

## 2019-12-04 RX ORDER — HYDRALAZINE HYDROCHLORIDE 25 MG/1
25 TABLET, FILM COATED ORAL EVERY 8 HOURS PRN
Status: DISCONTINUED | OUTPATIENT
Start: 2019-12-05 | End: 2019-12-06 | Stop reason: HOSPADM

## 2019-12-04 RX ORDER — SODIUM CHLORIDE 0.9 % (FLUSH) 0.9 %
10 SYRINGE (ML) INJECTION
Status: DISCONTINUED | OUTPATIENT
Start: 2019-12-04 | End: 2019-12-06 | Stop reason: HOSPADM

## 2019-12-04 RX ORDER — DIFLUPREDNATE OPHTHALMIC 0.5 MG/ML
1 EMULSION OPHTHALMIC 4 TIMES DAILY
Status: DISCONTINUED | OUTPATIENT
Start: 2019-12-05 | End: 2019-12-06 | Stop reason: HOSPADM

## 2019-12-04 RX ORDER — PROMETHAZINE HYDROCHLORIDE 25 MG/1
25 TABLET ORAL EVERY 6 HOURS PRN
Status: DISCONTINUED | OUTPATIENT
Start: 2019-12-04 | End: 2019-12-06 | Stop reason: HOSPADM

## 2019-12-04 NOTE — TELEPHONE ENCOUNTER
Contacted Paola in response to message. States she spoke with Hailey and has decided to send patient to ED at [facility] as this is urgent.   ----- Message from Rik Fan sent at 12/4/2019  1:46 PM CST -----  Contact: Paola with Marychuy Moreno in [place]      The caller states that the Pt needs to be seen STAT for Right Hallux Ischemic Ulcer.  Please contact the Pt to schedule asap.    Phone # [phone] for Paola with Dr.Erin Pak

## 2019-12-04 NOTE — TELEPHONE ENCOUNTER
----- Message from Sheri Joe MA sent at 12/4/2019  2:06 PM CST -----  Contact: Paola with Marychuy Moreno in [place]      ----- Message -----  From: Rik Fan  Sent: 12/4/2019   1:46 PM CST  To: Ricco Dukes Staff, [place] FARRIS Staff, #        The caller states that the Pt needs to be seen STAT for Right Hallux Ischemic Ulcer.  Please contact the Pt to schedule asap.    Phone # [phone] for Paola with Dr.Erin Pak

## 2019-12-04 NOTE — ED PROVIDER NOTES
Encounter Date: 12/4/2019       History     Chief Complaint   Patient presents with    Wound Infection     arrived via [facility] from home with c/o infection to right lower leg, possibly gangrene, diabetic     This is a 69 year old male with a PMH of DM who presents to the ED with a chief complaint of foot ulcer. He has a hx of left midfoot amputation. Reports a 2 week history of ulceration to the right hallux. He endorses pain and  paresthesias to the mid lower leg. He does report drainage from the area initially, however no current drainage. He was seen by his podiatrist at an outside clinic today and referred to the ED for admission.  Denies fever, chills, trauma, joint swelling, erythema or additional complaints. Reports good glucose control 100-120s fasting, reports compliance with insulin.        Review of patient's allergies indicates:  No Known Allergies  Past Medical History:   Diagnosis Date    Arthritis     legs    Diabetes mellitus     Diabetes mellitus, type 2     Hyperlipidemia     Osteomyelitis      Past Surgical History:   Procedure Laterality Date    FOOT AMPUTATION  October 2010    left high midfoot amputation     Family History   Problem Relation Age of Onset    Diabetes Mother     Heart disease Mother     Stroke Sister     Cancer Brother     Diabetes Sister      Social History     Tobacco Use    Smoking status: Never Smoker    Smokeless tobacco: Never Used   Substance Use Topics    Alcohol use: No     Comment: occassional    Drug use: No     Review of Systems   Constitutional: Negative for chills and fever.   HENT: Negative for sore throat.    Respiratory: Negative for shortness of breath.    Cardiovascular: Negative for chest pain.   Gastrointestinal: Negative for nausea and vomiting.   Genitourinary: Negative for dysuria.   Musculoskeletal: Negative for back pain.   Skin: Positive for wound.   Neurological: Negative for weakness.   Hematological: Does not bruise/bleed easily.        Physical Exam     Initial Vitals [12/04/19 1548]   BP Pulse Resp Temp SpO2   (!) 156/63 65 16 98.8 °F (37.1 °C) 98 %      MAP       --         Physical Exam    Constitutional: He appears well-developed and well-nourished. No distress.   HENT:   Head: Atraumatic.   Eyes: Conjunctivae and EOM are normal. Pupils are equal, round, and reactive to light.   Cardiovascular: Normal rate, regular rhythm and normal heart sounds.   Bilateral lower extremity edema.  Biphasic DP signal by Doppler in the right foot.   Pulmonary/Chest: Breath sounds normal. No respiratory distress. He has no wheezes. He has no rhonchi. He has no rales.   Abdominal: Soft. Bowel sounds are normal. There is no tenderness.   Neurological: He is alert and oriented to person, place, and time.   Skin: Skin is warm and dry.   Ulceration of right great toe with black eschar.  Decreased cap refill.  No significant surrounding erythema.                     ED Course   Procedures  Labs Reviewed   CBC W/ AUTO DIFFERENTIAL - Abnormal; Notable for the following components:       Result Value    RBC 3.90 (*)     Hemoglobin 10.8 (*)     Hematocrit 35.9 (*)     Mean Corpuscular Hemoglobin Conc 30.1 (*)     RDW 15.2 (*)     All other components within normal limits   COMPREHENSIVE METABOLIC PANEL - Abnormal; Notable for the following components:    Glucose 132 (*)     Albumin 3.1 (*)     eGFR if non  55.3 (*)     All other components within normal limits   POCT GLUCOSE - Abnormal; Notable for the following components:    POCT Glucose 140 (*)     All other components within normal limits   POCT GLUCOSE MONITORING CONTINUOUS          Imaging Results          US Lower Extremity Arteries Bilateral (In process)                X-Ray Foot Complete Right (Final result)  Result time 12/04/19 18:20:20    Final result by Keerthi Mcguire MD (12/04/19 18:20:20)                 Impression:      1. The 5th metatarsal findings may be related to previous  trauma versus surgery.  2. Findings suggestive of air within the soft tissues about the 5th metatarsal.  A soft tissue defect is also identified in the region of the posterior calcaneus.  These may be sites of ulceration for the patient.  No other soft tissue abnormalities that would be compatible with ulceration.  3. No evidence on this exam for osteomyelitis.      Electronically signed by: Keerthi Mcguire MD  Date:    12/04/2019  Time:    18:20             Narrative:    EXAMINATION:  XR FOOT COMPLETE 3 VIEW RIGHT    CLINICAL HISTORY:  . Non-pressure chronic ulcer of other part of unspecified foot with unspecified severity    TECHNIQUE:  AP, lateral, and oblique views of the right foot were performed.    COMPARISON:  08/31/2006    FINDINGS:  There is diffuse osteopenia..  The lateral radiograph is limited by positioning.  The 5th metatarsal diaphysis is irregular in its shape.  No acute fractures are identified.  There is no cortical disruption of this bone.  Furthermore, the remaining osseous structures show no evidence of fracture or cortical destruction.  The soft tissues are remarkable for advanced calcified peripheral vascular atherosclerosis.  There are findings suggestive of subcutaneous air in the soft tissues adjacent to the 5th metatarsal.  This is only appreciated on the AP view and not supported on the remaining views.  Findings suggestive of a soft tissue defect in the region of the posterior calcaneus.  The joint spaces are relatively preserved.                                 Medical Decision Making:   History:   Old Medical Records: I decided to obtain old medical records.  Old Records Summarized: records from clinic visits.  Clinical Tests:   Lab Tests: Ordered and Reviewed  Radiological Study: Ordered and Reviewed  Other:   I have discussed this case with another health care provider.       <> Summary of the Discussion: Hospital medicine       APC / Resident Notes:   69-year-old diabetic male  presenting with ischemic ulceration of right great toe.    Labs demonstrate H&H 10/35, no leukocytosis, stable renal function Cr 1.3, glucose 132.  X-ray of the right foot demonstrates no evidence of osteomyelitis.  There is an incidental note of findings related to the 5th metatarsal, which is unrelated to the presenting complaint.                            Clinical Impression:       ICD-10-CM ICD-9-CM   1. Ischemic ulcer diabetic foot E11.621 250.80    L97.509 707.15   2. Foot ulcer L97.509 707.15         Disposition:   Disposition: Admitted  Condition: Stable                     Yudi Mcfadden PA-C  12/04/19 1939

## 2019-12-04 NOTE — TELEPHONE ENCOUNTER
----- Message from Rik Dafnejonathan sent at 12/4/2019  1:46 PM CST -----  Contact: Paola with [facility] Moreno in [place]      The caller states that the Pt needs to be seen STAT for Right Hallux Ischemic Ulcer.  Please contact the Pt to schedule asap.    Phone # [phone] for Paola with Dr.Erin Pak

## 2019-12-04 NOTE — TELEPHONE ENCOUNTER
"Returned call spoke with Paola ([facility]) states " we are bringing Mr Castañeda to the emergency room."  "

## 2019-12-05 ENCOUNTER — TELEPHONE (OUTPATIENT)
Dept: CARDIOLOGY | Facility: CLINIC | Age: 69
End: 2019-12-05

## 2019-12-05 LAB
ANION GAP SERPL CALC-SCNC: 9 MMOL/L (ref 8–16)
BASOPHILS # BLD AUTO: 0.02 K/UL (ref 0–0.2)
BASOPHILS NFR BLD: 0.3 % (ref 0–1.9)
BUN SERPL-MCNC: 18 MG/DL (ref 8–23)
CALCIUM SERPL-MCNC: 8.9 MG/DL (ref 8.7–10.5)
CHLORIDE SERPL-SCNC: 105 MMOL/L (ref 95–110)
CO2 SERPL-SCNC: 28 MMOL/L (ref 23–29)
CREAT SERPL-MCNC: 1.2 MG/DL (ref 0.5–1.4)
CRP SERPL-MCNC: 12.5 MG/L (ref 0–8.2)
DIFFERENTIAL METHOD: ABNORMAL
EOSINOPHIL # BLD AUTO: 0.2 K/UL (ref 0–0.5)
EOSINOPHIL NFR BLD: 3.8 % (ref 0–8)
ERYTHROCYTE [DISTWIDTH] IN BLOOD BY AUTOMATED COUNT: 15.1 % (ref 11.5–14.5)
ERYTHROCYTE [SEDIMENTATION RATE] IN BLOOD BY WESTERGREN METHOD: 73 MM/HR (ref 0–23)
EST. GFR  (AFRICAN AMERICAN): >60 ML/MIN/1.73 M^2
EST. GFR  (NON AFRICAN AMERICAN): >60 ML/MIN/1.73 M^2
ESTIMATED AVG GLUCOSE: 235 MG/DL (ref 68–131)
GLUCOSE SERPL-MCNC: 101 MG/DL (ref 70–110)
HBA1C MFR BLD HPLC: 9.8 % (ref 4–5.6)
HCT VFR BLD AUTO: 32.6 % (ref 40–54)
HGB BLD-MCNC: 10 G/DL (ref 14–18)
IMM GRANULOCYTES # BLD AUTO: 0.01 K/UL (ref 0–0.04)
IMM GRANULOCYTES NFR BLD AUTO: 0.2 % (ref 0–0.5)
IRON SERPL-MCNC: 34 UG/DL (ref 45–160)
LYMPHOCYTES # BLD AUTO: 1.9 K/UL (ref 1–4.8)
LYMPHOCYTES NFR BLD: 30.3 % (ref 18–48)
MAGNESIUM SERPL-MCNC: 1.7 MG/DL (ref 1.6–2.6)
MCH RBC QN AUTO: 27.9 PG (ref 27–31)
MCHC RBC AUTO-ENTMCNC: 30.7 G/DL (ref 32–36)
MCV RBC AUTO: 91 FL (ref 82–98)
MONOCYTES # BLD AUTO: 0.7 K/UL (ref 0.3–1)
MONOCYTES NFR BLD: 12.1 % (ref 4–15)
NEUTROPHILS # BLD AUTO: 3.3 K/UL (ref 1.8–7.7)
NEUTROPHILS NFR BLD: 53.3 % (ref 38–73)
NRBC BLD-RTO: 0 /100 WBC
PHOSPHATE SERPL-MCNC: 3.2 MG/DL (ref 2.7–4.5)
PLATELET # BLD AUTO: 234 K/UL (ref 150–350)
PMV BLD AUTO: 10.6 FL (ref 9.2–12.9)
POCT GLUCOSE: 101 MG/DL (ref 70–110)
POCT GLUCOSE: 114 MG/DL (ref 70–110)
POCT GLUCOSE: 116 MG/DL (ref 70–110)
POCT GLUCOSE: 123 MG/DL (ref 70–110)
POCT GLUCOSE: 180 MG/DL (ref 70–110)
POTASSIUM SERPL-SCNC: 3.9 MMOL/L (ref 3.5–5.1)
RBC # BLD AUTO: 3.58 M/UL (ref 4.6–6.2)
SATURATED IRON: 14 % (ref 20–50)
SODIUM SERPL-SCNC: 142 MMOL/L (ref 136–145)
TOTAL IRON BINDING CAPACITY: 237 UG/DL (ref 250–450)
TRANSFERRIN SERPL-MCNC: 160 MG/DL (ref 200–375)
WBC # BLD AUTO: 6.11 K/UL (ref 3.9–12.7)

## 2019-12-05 PROCEDURE — 25000003 PHARM REV CODE 250: Performed by: PHYSICIAN ASSISTANT

## 2019-12-05 PROCEDURE — 99223 1ST HOSP IP/OBS HIGH 75: CPT | Mod: ,,, | Performed by: PODIATRIST

## 2019-12-05 PROCEDURE — 83036 HEMOGLOBIN GLYCOSYLATED A1C: CPT

## 2019-12-05 PROCEDURE — 85652 RBC SED RATE AUTOMATED: CPT

## 2019-12-05 PROCEDURE — 83540 ASSAY OF IRON: CPT

## 2019-12-05 PROCEDURE — 25000003 PHARM REV CODE 250: Performed by: INTERNAL MEDICINE

## 2019-12-05 PROCEDURE — 97802 MEDICAL NUTRITION INDIV IN: CPT

## 2019-12-05 PROCEDURE — 63600175 PHARM REV CODE 636 W HCPCS: Performed by: INTERNAL MEDICINE

## 2019-12-05 PROCEDURE — 80048 BASIC METABOLIC PNL TOTAL CA: CPT

## 2019-12-05 PROCEDURE — 84100 ASSAY OF PHOSPHORUS: CPT

## 2019-12-05 PROCEDURE — G0378 HOSPITAL OBSERVATION PER HR: HCPCS

## 2019-12-05 PROCEDURE — 96372 THER/PROPH/DIAG INJ SC/IM: CPT

## 2019-12-05 PROCEDURE — 97165 OT EVAL LOW COMPLEX 30 MIN: CPT

## 2019-12-05 PROCEDURE — 86140 C-REACTIVE PROTEIN: CPT

## 2019-12-05 PROCEDURE — 99222 1ST HOSP IP/OBS MODERATE 55: CPT | Mod: GC,,, | Performed by: SURGERY

## 2019-12-05 PROCEDURE — 83735 ASSAY OF MAGNESIUM: CPT

## 2019-12-05 PROCEDURE — C9399 UNCLASSIFIED DRUGS OR BIOLOG: HCPCS | Performed by: INTERNAL MEDICINE

## 2019-12-05 PROCEDURE — 85025 COMPLETE CBC W/AUTO DIFF WBC: CPT

## 2019-12-05 PROCEDURE — 99223 PR INITIAL HOSPITAL CARE,LEVL III: ICD-10-PCS | Mod: ,,, | Performed by: PODIATRIST

## 2019-12-05 PROCEDURE — 97161 PT EVAL LOW COMPLEX 20 MIN: CPT

## 2019-12-05 PROCEDURE — 36415 COLL VENOUS BLD VENIPUNCTURE: CPT

## 2019-12-05 PROCEDURE — 99222 PR INITIAL HOSPITAL CARE,LEVL II: ICD-10-PCS | Mod: GC,,, | Performed by: SURGERY

## 2019-12-05 PROCEDURE — 11000001 HC ACUTE MED/SURG PRIVATE ROOM

## 2019-12-05 RX ORDER — INSULIN ASPART 100 [IU]/ML
5 INJECTION, SOLUTION INTRAVENOUS; SUBCUTANEOUS
Status: DISCONTINUED | OUTPATIENT
Start: 2019-12-05 | End: 2019-12-06 | Stop reason: HOSPADM

## 2019-12-05 RX ORDER — INSULIN ASPART 100 [IU]/ML
0-5 INJECTION, SOLUTION INTRAVENOUS; SUBCUTANEOUS
Status: DISCONTINUED | OUTPATIENT
Start: 2019-12-05 | End: 2019-12-06 | Stop reason: HOSPADM

## 2019-12-05 RX ORDER — IBUPROFEN 200 MG
24 TABLET ORAL
Status: DISCONTINUED | OUTPATIENT
Start: 2019-12-05 | End: 2019-12-06 | Stop reason: HOSPADM

## 2019-12-05 RX ORDER — DEXTROSE MONOHYDRATE 100 MG/ML
25 INJECTION, SOLUTION INTRAVENOUS
Status: DISCONTINUED | OUTPATIENT
Start: 2019-12-05 | End: 2019-12-06 | Stop reason: HOSPADM

## 2019-12-05 RX ORDER — IBUPROFEN 200 MG
16 TABLET ORAL
Status: DISCONTINUED | OUTPATIENT
Start: 2019-12-05 | End: 2019-12-06 | Stop reason: HOSPADM

## 2019-12-05 RX ORDER — GLUCAGON 1 MG
1 KIT INJECTION
Status: DISCONTINUED | OUTPATIENT
Start: 2019-12-05 | End: 2019-12-06 | Stop reason: HOSPADM

## 2019-12-05 RX ORDER — DEXTROSE MONOHYDRATE 100 MG/ML
12.5 INJECTION, SOLUTION INTRAVENOUS
Status: DISCONTINUED | OUTPATIENT
Start: 2019-12-05 | End: 2019-12-06 | Stop reason: HOSPADM

## 2019-12-05 RX ADMIN — ATORVASTATIN CALCIUM 40 MG: 20 TABLET, FILM COATED ORAL at 12:12

## 2019-12-05 RX ADMIN — ASPIRIN 81 MG: 81 TABLET, DELAYED RELEASE ORAL at 08:12

## 2019-12-05 RX ADMIN — DUREZOL 1 DROP: 0.5 EMULSION OPHTHALMIC at 09:12

## 2019-12-05 RX ADMIN — TAMSULOSIN HYDROCHLORIDE 0.4 MG: 0.4 CAPSULE ORAL at 08:12

## 2019-12-05 RX ADMIN — ENOXAPARIN SODIUM 40 MG: 40 INJECTION SUBCUTANEOUS at 04:12

## 2019-12-05 RX ADMIN — DUREZOL 1 DROP: 0.5 EMULSION OPHTHALMIC at 11:12

## 2019-12-05 RX ADMIN — LOSARTAN POTASSIUM 50 MG: 25 TABLET ORAL at 08:12

## 2019-12-05 RX ADMIN — INSULIN ASPART 5 UNITS: 100 INJECTION, SOLUTION INTRAVENOUS; SUBCUTANEOUS at 08:12

## 2019-12-05 RX ADMIN — HYDRALAZINE HYDROCHLORIDE 25 MG: 25 TABLET, FILM COATED ORAL at 09:12

## 2019-12-05 RX ADMIN — INSULIN ASPART 5 UNITS: 100 INJECTION, SOLUTION INTRAVENOUS; SUBCUTANEOUS at 11:12

## 2019-12-05 RX ADMIN — INSULIN DETEMIR 20 UNITS: 100 INJECTION, SOLUTION SUBCUTANEOUS at 08:12

## 2019-12-05 RX ADMIN — INSULIN DETEMIR 20 UNITS: 100 INJECTION, SOLUTION SUBCUTANEOUS at 09:12

## 2019-12-05 RX ADMIN — ATORVASTATIN CALCIUM 40 MG: 20 TABLET, FILM COATED ORAL at 09:12

## 2019-12-05 RX ADMIN — DUREZOL 1 DROP: 0.5 EMULSION OPHTHALMIC at 04:12

## 2019-12-05 RX ADMIN — INSULIN ASPART 5 UNITS: 100 INJECTION, SOLUTION INTRAVENOUS; SUBCUTANEOUS at 04:12

## 2019-12-05 NOTE — PROGRESS NOTES
"Food & Nutrition Education    Diet Education: A1C Diabetes Management  Learners:Pt    Nutrition education provided with handout: Meal Planning Using the Plate Method    Comments: Pt stated he was aware of A1C levels but could not describe what it was. Explained to pt what A1C levels were and what they monitor. Pt stated he keeps tracks of CHO intake sometimes but usually "falls off" after a few days. Discussed how CHO affects blood sugar. Used handout to give examples of ways to make a balanced meal with appropriate CHO servings. Pt stated he understood how to use the tips in the handout to make dietary adjustments. Left handout with pt.    All questions and concerns answered: yes    Follow up: 12/13/19    Please re-consult as needed.    Thanks!  Va Ayala"

## 2019-12-05 NOTE — CONSULTS
"[facility]  Vascular Surgery  Consult Note    Inpatient consult to Vascular Surgery  Consult performed by: Luma Mcleod MD  Consult ordered by: Nael Villarreal MD  Reason for consult: Eval arterial disease        Subjective:     Chief Complaint/Reason for Admission: Dry gangrene of the R great toe    History of Present Illness: Mr Castañeda is a 68yo male with PMH poorly controlled DM and HTN who was admitted to the medicine service with dry gangrene of the R great toe. Vascular surgery was consulted for evaluation of arterial disease.    Patient states that two weeks ago he developed a blister over his R great toe. This burst and then he noted that his toe began turning black. He denies erythema, warmth, tenderness or discharge from the area. He presented to podiatry clinic yesterday for routine follow up and was recommended to be evaluated at the hospital for admission. He has no history of claudication symptoms, denies chest pain or shortness of breath. Has never smoked. No history of heart attack or stroke. Not currently on aspirin or statin at home. MARIO on the R was 0.9, has L choparts amputation.     Medications Prior to Admission   Medication Sig Dispense Refill Last Dose    ammonium lactate (LAC-HYDRIN) 12 % lotion    12/4/2019    aspirin (ECOTRIN) 81 MG EC tablet Take 81 mg by mouth once daily.   12/4/2019    atorvastatin (LIPITOR) 20 MG tablet TK 1 T PO QD  1 12/4/2019    atorvastatin (LIPITOR) 40 MG tablet Take 40 mg by mouth every evening.    12/4/2019    BD ULTRA-FINE ADITYA PEN NEEDLE 32 gauge x 5/32" Ndle USE FOUR TIMES DAILY WITH MEALS AND NIGHTLY 100 each 0 12/4/2019    BESIVANCE 0.6 % DrpS INSTILL 1 DROP INTO THE OS TID  2 12/4/2019    blood sugar diagnostic (CONTOUR TEST STRIPS) Strp Inject 1 strip into the skin 2 (two) times daily before meals. 100 strip 6 12/4/2019    DUREZOL 0.05 % Drop ophthalmic solution INSTILL 1 DROP INTO THE OS QID  2 12/4/2019    econazole " nitrate 1 % cream APPLY EXTERNALLY TO THE AFFECTED AREA ONCE DAILY 85 g 2 12/4/2019    ILEVRO 0.3 % DrpS INSTILL 1 DROP INTO THE OS QD  2 12/4/2019    JANUVIA 100 mg Tab TAKE 1 TABLET BY MOUTH ONCE DAILY 90 tablet 1 12/4/2019    LEVEMIR FLEXTOUCH U-100 INSULN 100 unit/mL (3 mL) InPn pen INJECT 25 UNITS UNDER THE SKIN TWICE DAILY 15 mL 0 12/4/2019    losartan (COZAAR) 50 MG tablet    12/4/2019    metformin (GLUCOPHAGE) 500 MG tablet Take 2 tablets (1,000 mg total) by mouth 2 (two) times daily with meals. 360 tablet 3 12/4/2019    MICROLET LANCET Misc   0 12/4/2019    sitagliptin (JANUVIA) 100 MG Tab Take 1 tablet (100 mg total) by mouth once daily. 90 tablet 3 12/4/2019    tamsulosin (FLOMAX) 0.4 mg Cp24 Take 0.4 mg by mouth once daily.   12/4/2019    triamcinolone acetonide 0.1% (KENALOG) 0.1 % cream APPLY EXTERNALLY TO THE AFFECTED AREA TWICE DAILY AS DIRECTED 454 g 0 12/4/2019    TRUE METRIX GLUCOSE TEST STRIP Strp USE TO TEST TWICE DAILY 100 strip 6 12/4/2019       Review of patient's allergies indicates:  No Known Allergies    Past Medical History:   Diagnosis Date    Arthritis     legs    Diabetes mellitus     Diabetes mellitus, type 2     Hyperlipidemia     Osteomyelitis      Past Surgical History:   Procedure Laterality Date    FOOT AMPUTATION  October 2010    left high midfoot amputation     Family History     Problem Relation (Age of Onset)    Cancer Brother    Diabetes Mother, Sister    Heart disease Mother    Stroke Sister        Tobacco Use    Smoking status: Never Smoker    Smokeless tobacco: Never Used   Substance and Sexual Activity    Alcohol use: No     Comment: occassional    Drug use: No    Sexual activity: Not Currently     Review of Systems   Constitutional: Negative for chills and fever.   Respiratory: Negative for chest tightness and shortness of breath.    Cardiovascular: Negative for chest pain.   Gastrointestinal: Negative for abdominal pain.   Genitourinary: Negative  for dysuria and hematuria.   Musculoskeletal: Negative for arthralgias.   Neurological: Negative for dizziness and headaches.   Hematological: Negative for adenopathy.   Psychiatric/Behavioral: Negative for agitation.     Objective:     Vital Signs (Most Recent):  Temp: 98.3 °F (36.8 °C) (12/05/19 1100)  Pulse: (!) 40 (12/05/19 1100)  Resp: 18 (12/05/19 1100)  BP: (!) 165/72 (12/05/19 1100)  SpO2: 98 % (12/05/19 1100) Vital Signs (24h Range):  Temp:  [98.2 °F (36.8 °C)-98.8 °F (37.1 °C)] 98.3 °F (36.8 °C)  Pulse:  [40-65] 40  Resp:  [15-18] 18  SpO2:  [97 %-99 %] 98 %  BP: (133-194)/(63-81) 165/72     Weight: 117.9 kg (260 lb)  Body mass index is 38.4 kg/m².    Physical Exam   Constitutional: He is oriented to person, place, and time. He appears well-developed and well-nourished.   HENT:   Head: Normocephalic and atraumatic.   Cardiovascular: Normal rate.   Pulmonary/Chest: Effort normal.   Musculoskeletal:   R DP multiphasic, PT monophasic signals.    R foot with dry gangrene of the great toe (see pictures below). No erythema, warmth, tenderness, drainage. Diffuse woody skin changes to the anterior skin with discoloration consistent with venous stasis and lymphedema.   Neurological: He is alert and oriented to person, place, and time.   Skin: Skin is warm and dry.   Nursing note and vitals reviewed.      Significant Labs:  CBC:   Recent Labs   Lab 12/05/19  0542   WBC 6.11   RBC 3.58*   HGB 10.0*   HCT 32.6*      MCV 91   MCH 27.9   MCHC 30.7*     CMP:   Recent Labs   Lab 12/04/19  1734 12/05/19  0542   * 101   CALCIUM 9.2 8.9   ALBUMIN 3.1*  --    PROT 7.4  --     142   K 4.7 3.9   CO2 26 28    105   BUN 19 18   CREATININE 1.3 1.2   ALKPHOS 72  --    ALT 22  --    AST 23  --    BILITOT 0.3  --        Significant Diagnostics:  U/S:   Impression:       Right-sided MARIO 0.9.    Left-sided MARIO unable to be obtained.   Right-sided arterial waveforms appear relatively well preserved.    Left  arterial waveforms are monophasic beginning in the left superficial femoral artery, but without visualized stenosis or occlusion.      Assessment/Plan:     * Ischemic ulcer diabetic foot  68yo male with dry gangrene of the R great toe    - US with normal MARIO on the R, multiphasic signals in DP - should have adequate blood flow to heal wound, suspect that patient has microvascular disease due to poorly controlled diabetes which is contributing to his non-healing wound  - recommend aspirin 81mg daily, statin and BP control   - ok for discharge from vascular surgery standpoint with outpatient follow up  - will follow peripherally, please call with questions        Thank you for your consult. I will sign off. Please contact us if you have any additional questions.    Luma Mcleod MD  Vascular Surgery  [facility]

## 2019-12-05 NOTE — ED NOTES
Pt's first and last name and birthday confirmed.   LOC: The patient is awake, alert and aware of environment with an appropriate affect, the patient is oriented x 3 and speaking appropriately.  APPEARANCE: Patient resting comfortably and in no acute distress, patient is clean and well groomed.  SKIN: The skin is warm and dry, patient has normal skin turgor and moist mucus membranes, skin intact, no breakdown or brusing noted. Eschar noted to the right 1st toe. Pt reports decreased sensation. Capillary refill is <2 seconds. Dorsalis pedis pulsed obtained by doppler and is strong.   MUSKULOSKELETAL: Patient moving all extremities well, no obvious swelling or deformities noted.Left foot amputation noted.   RESPIRATORY: Airway is open and patent, respirations are spontaneous, patient has a normal effort and rate.   CARDIAC:  No peripheral edema.    ABDOMEN: Soft and non tender to palpation, no distention noted.   NEURO: No neuro deficits, hand grasp equal, no drift noted, no facial droop noted. Speech is clear.

## 2019-12-05 NOTE — HPI
Mr Castañeda is a 70yo male with PMH poorly controlled DM and HTN who was admitted to the medicine service with dry gangrene of the R great toe. Vascular surgery was consulted for evaluation of arterial disease.    Patient states that two weeks ago he developed a blister over his R great toe. This burst and then he noted that his toe began turning black. He denies erythema, warmth, tenderness or discharge from the area. He presented to podiatry clinic yesterday for routine follow up and was recommended to be evaluated at the hospital for admission. He has no history of claudication symptoms, denies chest pain or shortness of breath. Has never smoked. No history of heart attack or stroke. Not currently on aspirin or statin at home. MARIO on the R was 0.9, has L choparts amputation.

## 2019-12-05 NOTE — HPI
Saji Castañeda is a 69 y.o. male who  has a past medical history of Arthritis, Diabetes mellitus, Diabetes mellitus, type 2, Hyperlipidemia, and Osteomyelitis.    Patient Admited for right foot ischemic ulcer.  Has history of left foot choparts amputation.  He states he is regularly seen by podiatrist on [facility].  States that he was seen yesterday by the podiatrist told that his right great toe is turning gangrenous.  States that some weeks ago he had a blister form on his right great toe that since was necrotic.  He states that he did notice some serous fluid.  But denies any purulence.  Denies fever chills nausea vomiting. States that he walks with a walker with a special shoe on the left leg, Complains of bilateral leg swelling that is so bad that he can't put on his shoe on his left leg.

## 2019-12-05 NOTE — TELEPHONE ENCOUNTER
I called and spoke to patient , and we scheduled him an appointment to see , [facility] D/leidy.      Josie Knott (rita).                      ----- Message from Nael Villarreal MD sent at 12/5/2019 10:05 AM CST -----  Please make an appointment for the above patient with Dr. Wu within the next 14 days for PAD, gangrene on right great toe.        Thanks!    Nael Villarreal

## 2019-12-05 NOTE — PLAN OF CARE
Lalo Heath MD       [id number] - [place], [place] - [address] AT [facility] [address]  [address]  [place] [address]  Phone: [phone] Fax: [phone]    Payor: [employer] MEDICARE / Plan: [employer] MEDICARE HMO / Product Type: Capitation /       12/05/19 1100   Discharge Assessment   Assessment Type Discharge Planning Assessment   Confirmed/corrected address and phone number on facesheet? Yes   Assessment information obtained from? Patient   Expected Length of Stay (days) 1   Communicated expected length of stay with patient/caregiver yes   Prior to hospitilization cognitive status: Alert/Oriented   Prior to hospitalization functional status: Needs Assistance;Assistive Equipment   Current cognitive status: Alert/Oriented   Current Functional Status: Assistive Equipment;Needs Assistance   Lives With sibling(s)  (Kandy Starr)   Able to Return to Prior Arrangements yes   Is patient able to care for self after discharge? Yes   Who are your caregiver(s) and their phone number(s)? Kandy Starr     [phone]   Patient's perception of discharge disposition home or selfcare   Readmission Within the Last 30 Days no previous admission in last 30 days   Patient currently being followed by outpatient case management? No   Patient currently receives any other outside agency services? No   Equipment Currently Used at Home wheelchair   Do you have any problems affording any of your prescribed medications? No   Is the patient taking medications as prescribed? yes   Does the patient have transportation home? Yes   Transportation Anticipated family or friend will provide   Dialysis Name and Scheduled days N/A   Does the patient receive services at the Coumadin Clinic? No   Discharge Plan A Home with family   Discharge Plan B Home   DME Needed Upon Discharge  rollator  ( put in order with [facility] for a rollator for the patient.)    Patient/Family in Agreement with Plan yes

## 2019-12-05 NOTE — SUBJECTIVE & OBJECTIVE
"Medications Prior to Admission   Medication Sig Dispense Refill Last Dose    ammonium lactate (LAC-HYDRIN) 12 % lotion    12/4/2019    aspirin (ECOTRIN) 81 MG EC tablet Take 81 mg by mouth once daily.   12/4/2019    atorvastatin (LIPITOR) 20 MG tablet TK 1 T PO QD  1 12/4/2019    atorvastatin (LIPITOR) 40 MG tablet Take 40 mg by mouth every evening.    12/4/2019    BD ULTRA-FINE ADITYA PEN NEEDLE 32 gauge x 5/32" Ndle USE FOUR TIMES DAILY WITH MEALS AND NIGHTLY 100 each 0 12/4/2019    BESIVANCE 0.6 % DrpS INSTILL 1 DROP INTO THE OS TID  2 12/4/2019    blood sugar diagnostic (CONTOUR TEST STRIPS) Strp Inject 1 strip into the skin 2 (two) times daily before meals. 100 strip 6 12/4/2019    DUREZOL 0.05 % Drop ophthalmic solution INSTILL 1 DROP INTO THE OS QID  2 12/4/2019    econazole nitrate 1 % cream APPLY EXTERNALLY TO THE AFFECTED AREA ONCE DAILY 85 g 2 12/4/2019    ILEVRO 0.3 % DrpS INSTILL 1 DROP INTO THE OS QD  2 12/4/2019    JANUVIA 100 mg Tab TAKE 1 TABLET BY MOUTH ONCE DAILY 90 tablet 1 12/4/2019    LEVEMIR FLEXTOUCH U-100 INSULN 100 unit/mL (3 mL) InPn pen INJECT 25 UNITS UNDER THE SKIN TWICE DAILY 15 mL 0 12/4/2019    losartan (COZAAR) 50 MG tablet    12/4/2019    metformin (GLUCOPHAGE) 500 MG tablet Take 2 tablets (1,000 mg total) by mouth 2 (two) times daily with meals. 360 tablet 3 12/4/2019    MICROLET LANCET Misc   0 12/4/2019    sitagliptin (JANUVIA) 100 MG Tab Take 1 tablet (100 mg total) by mouth once daily. 90 tablet 3 12/4/2019    tamsulosin (FLOMAX) 0.4 mg Cp24 Take 0.4 mg by mouth once daily.   12/4/2019    triamcinolone acetonide 0.1% (KENALOG) 0.1 % cream APPLY EXTERNALLY TO THE AFFECTED AREA TWICE DAILY AS DIRECTED 454 g 0 12/4/2019    TRUE METRIX GLUCOSE TEST STRIP Strp USE TO TEST TWICE DAILY 100 strip 6 12/4/2019       Review of patient's allergies indicates:  No Known Allergies    Past Medical History:   Diagnosis Date    Arthritis     legs    Diabetes mellitus     " Diabetes mellitus, type 2     Hyperlipidemia     Osteomyelitis      Past Surgical History:   Procedure Laterality Date    FOOT AMPUTATION  October 2010    left high midfoot amputation     Family History     Problem Relation (Age of Onset)    Cancer Brother    Diabetes Mother, Sister    Heart disease Mother    Stroke Sister        Tobacco Use    Smoking status: Never Smoker    Smokeless tobacco: Never Used   Substance and Sexual Activity    Alcohol use: No     Comment: occassional    Drug use: No    Sexual activity: Not Currently     Review of Systems   Constitutional: Negative for chills and fever.   Respiratory: Negative for chest tightness and shortness of breath.    Cardiovascular: Negative for chest pain.   Gastrointestinal: Negative for abdominal pain.   Genitourinary: Negative for dysuria and hematuria.   Musculoskeletal: Negative for arthralgias.   Neurological: Negative for dizziness and headaches.   Hematological: Negative for adenopathy.   Psychiatric/Behavioral: Negative for agitation.     Objective:     Vital Signs (Most Recent):  Temp: 98.3 °F (36.8 °C) (12/05/19 1100)  Pulse: (!) 40 (12/05/19 1100)  Resp: 18 (12/05/19 1100)  BP: (!) 165/72 (12/05/19 1100)  SpO2: 98 % (12/05/19 1100) Vital Signs (24h Range):  Temp:  [98.2 °F (36.8 °C)-98.8 °F (37.1 °C)] 98.3 °F (36.8 °C)  Pulse:  [40-65] 40  Resp:  [15-18] 18  SpO2:  [97 %-99 %] 98 %  BP: (133-194)/(63-81) 165/72     Weight: 117.9 kg (260 lb)  Body mass index is 38.4 kg/m².    Physical Exam   Constitutional: He is oriented to person, place, and time. He appears well-developed and well-nourished.   HENT:   Head: Normocephalic and atraumatic.   Cardiovascular: Normal rate.   Pulmonary/Chest: Effort normal.   Musculoskeletal:   R DP multiphasic, PT monophasic signals.    R foot with dry gangrene of the great toe (see pictures below). No erythema, warmth, tenderness, drainage. Diffuse woody skin changes to the anterior skin with discoloration  consistent with venous stasis and lymphedema.   Neurological: He is alert and oriented to person, place, and time.   Skin: Skin is warm and dry.   Nursing note and vitals reviewed.      Significant Labs:  CBC:   Recent Labs   Lab 12/05/19  0542   WBC 6.11   RBC 3.58*   HGB 10.0*   HCT 32.6*      MCV 91   MCH 27.9   MCHC 30.7*     CMP:   Recent Labs   Lab 12/04/19  1734 12/05/19  0542   * 101   CALCIUM 9.2 8.9   ALBUMIN 3.1*  --    PROT 7.4  --     142   K 4.7 3.9   CO2 26 28    105   BUN 19 18   CREATININE 1.3 1.2   ALKPHOS 72  --    ALT 22  --    AST 23  --    BILITOT 0.3  --        Significant Diagnostics:  U/S:   Impression:       Right-sided MARIO 0.9.    Left-sided MARIO unable to be obtained.   Right-sided arterial waveforms appear relatively well preserved.    Left arterial waveforms are monophasic beginning in the left superficial femoral artery, but without visualized stenosis or occlusion.

## 2019-12-05 NOTE — NURSING
Assumed care of pt [id number]  Agree w/ prev RN assessment. Remains oriented, up in wheelchair @ time of assessment. Pulse checks unchanged. Denied pain. No other issues this shift. Safety and fall precautions reviewed w/ patient, who indicated understanding. See flowsheet for detail.

## 2019-12-05 NOTE — PT/OT/SLP EVAL
Physical Therapy Evaluation and Discharge Note    Patient Name:  Saji Castañeda   MRN:  [id number]    Recommendations:     Discharge Recommendations:  home   Discharge Equipment Recommendations: rollator   Barriers to discharge: None    Assessment:     Saji Castañeda is a 69 y.o. male admitted with a medical diagnosis of Ischemic ulcer diabetic foot. Patient is independent with all ADLs and functional mobility at this time.  Patient appears to be at baseline for ambulation, transfers and bed mobility.  Patient able to safely ambulate 15 ft with SBA with rolling walker and flexed posture.  Patient with good understanding of current functional level and is without further skilled PT needs at this time.      Recent Surgery: * No surgery found *      Plan:     During this hospitalization, patient does not require further acute PT services.  Please re-consult if situation changes.      Subjective       Patient/Family Comments/goals: To go home.  Pain/Comfort:  · Pain Rating 1: 0/10  · Pain Rating Post-Intervention 1: 0/10    Patients cultural, spiritual, Hinduism conflicts given the current situation: no    Living Environment:  Patient lives with brother and sister in a single story home with 3 steps to enter.  Patient has a Jacuzzi tub and typically uses a wheelchair for most mobility or a standard walker when out of the house.  Equipment available at home: wheelchair, bedside commode, walker, standard, grab bar.   Upon discharge, patient will have assistance from siblings.    Objective:     Communicated with RN prior to session.  Patient found up in wheelchair. with (no lines)  upon PT entry to room.    General Precautions: Standard, fall   Orthopedic Precautions:N/A   Braces: N/A     Exams:  · RLE ROM: WFL  · RLE Strength: WFL  · LLE ROM: WFL  · LLE Strength: WFL  ·     Functional Mobility:  · Bed Mobility:     · Supine to Sit: sitting in wheelchair on arrival reporting getting to wheelchair from bed  independently.  · Transfers:     · Sit to Stand:  stand by assistance with rolling walker  · Gait: Patient ambulated 15 ft with rolling walker and stand by assistance.      Therapeutic Activities and Exercises:   Patient safe with mobility with flexed posture during ambulation but good safety during ambulation.      Patient Education:    Patient educated on Fall risk, gait training, home safety, Home exercise program and transfer training by explanation.  Patient was receptive to education and verbalizes understanding.   AM-PAC 6 CLICK MOBILITY  Total Score:22     Patient left up in chair with all lines intact and call button in reach.    GOALS:   Multidisciplinary Problems     Physical Therapy Goals     Not on file          Multidisciplinary Problems (Resolved)        Problem: Physical Therapy Goal    Goal Priority Disciplines Outcome Goal Variances Interventions   Physical Therapy Goal   (Resolved)     PT, PT/OT Met                     History:     Past Medical History:   Diagnosis Date    Arthritis     legs    Diabetes mellitus     Diabetes mellitus, type 2     Hyperlipidemia     Osteomyelitis        Past Surgical History:   Procedure Laterality Date    FOOT AMPUTATION  October 2010    left high midfoot amputation       Time Tracking:     PT Received On: 12/05/19  PT Start Time: 1416     PT Stop Time: 1429  PT Total Time (min): 13 min     Billable Minutes: Evaluation 13 min        Trip Davis, PT  12/05/2019

## 2019-12-05 NOTE — PT/OT/SLP EVAL
Occupational Therapy   Evaluation and Discharge Note    Name: Saji Castañeda  MRN: [id number]  Admitting Diagnosis:  Ischemic ulcer diabetic foot      Recommendations:     Discharge Recommendations: home  Discharge Equipment Recommendations:  none  Barriers to discharge:  None    Assessment:     Saji Castañeda is a 69 y.o. male with a medical diagnosis of Ischemic ulcer diabetic foot. At this time, patient is functioning at their prior level of function and does not require further acute OT services.     Plan:     During this hospitalization, patient does not require further acute OT services.  Please re-consult if situation changes.    · Plan of Care Reviewed with: patient    Subjective     Chief Complaint: none  Patient/Family Comments/goals: to go home    Occupational Profile:  Living Environment: Pt lives with his brother and sister in a [facility] w/ 3 LON and B HR support that he can reach at the same time. Pt uses a [employer] tub.   Previous level of function: PTA, pt reports being independent w/ ADLs, only requiring assistance to don L sock and shoe. Pt uses a RW and w/c for functional mobility. Pt states he does not have any trouble ascending / descending steps at home or transferring into tub  Roles and Routines: brother  Equipment Used at home:  wheelchair, bedside commode, walker, rolling, grab bar  Assistance upon Discharge: pt states he will continue to have assistance from his brother and sister upon d/c    Pain/Comfort:  · Pain Rating 1: 0/10  · Pain Rating Post-Intervention 1: 0/10    Patients cultural, spiritual, Restorationist conflicts given the current situation: no    Objective:     Communicated with: RN prior to session.  Patient found sitting in the w/c with (no lines ) upon OT entry to room.    General Precautions: Standard, fall   Orthopedic Precautions:N/A   Braces: N/A     Occupational Performance:    Bed Mobility:    · n/a    Functional Mobility/Transfers:  · Patient completed Sit <> Stand Transfer with  supervision  with  rolling walker   · Functional Mobility: Pt ambulated 25 ft w/ supervision using RW w/ no LOB or SOB noted.     Activities of Daily Living:  · Pt had already completed full dressing prior to OT entrance and had completed all other ADLs    Cognitive/Visual Perceptual:  Cognitive/Psychosocial Skills:     -       Oriented to: Person, Place and Time   -       Follows Commands/attention:Follows multistep  commands  -       Communication: clear/fluent    Physical Exam:  Upper Extremity Range of Motion:     -       Right Upper Extremity: WFL  -       Left Upper Extremity: WFL  Upper Extremity Strength:    -       Right Upper Extremity: WFL  -       Left Upper Extremity: WFL   Strength:    -       Right Upper Extremity: WFL  -       Left Upper Extremity: WFL  Fine Motor Coordination:    -       Intact    AMPAC 6 Click ADL:  AMPAC Total Score: 24    Treatment & Education:  - OT role/POC  - Importance of OOB activity to maximize recovery     Patient left up in chair with call button in reach and RN notified    GOALS:   Multidisciplinary Problems     Occupational Therapy Goals     Not on file          Multidisciplinary Problems (Resolved)        Problem: Occupational Therapy Goal    Goal Priority Disciplines Outcome Interventions   Occupational Therapy Goal   (Resolved)     OT, PT/OT Met                    History:     Past Medical History:   Diagnosis Date    Arthritis     legs    Diabetes mellitus     Diabetes mellitus, type 2     Hyperlipidemia     Osteomyelitis        Past Surgical History:   Procedure Laterality Date    FOOT AMPUTATION  October 2010    left high midfoot amputation       Time Tracking:     OT Date of Treatment: 12/05/19  OT Start Time: 1414  OT Stop Time: 1427  OT Total Time (min): 13 min    Billable Minutes:Evaluation 13    Carlyn Roberto OT  12/5/2019

## 2019-12-05 NOTE — CARE UPDATE
Rapid Response Nurse Chart Check     Chart check completed, abnormal VS noted. Bedside RN Trace contacted, no concerns verbalized at this time. MD made of aware of HR in the 40s. Other vital signs stable. Instructed to call [phone] for further concerns or assistance.

## 2019-12-05 NOTE — PLAN OF CARE
OT eval complete. Pt tolerated session well. Pt is functioning at his PLOF and does not require acute OT at this time and pt to be d/c'ed.

## 2019-12-05 NOTE — ASSESSMENT & PLAN NOTE
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 Luisa Lara, CMA

## 2019-12-05 NOTE — SUBJECTIVE & OBJECTIVE
Subjective:             Scheduled Meds:   aspirin  81 mg Oral Daily    atorvastatin  40 mg Oral QHS    difluprednate  1 drop Left Eye QID    enoxaparin  40 mg Subcutaneous Daily    insulin aspart U-100  5 Units Subcutaneous TIDWM    insulin detemir U-100  20 Units Subcutaneous BID    losartan  50 mg Oral Daily    tamsulosin  0.4 mg Oral Daily     Continuous Infusions:  PRN Meds:acetaminophen, albuterol-ipratropium, dextrose 10 % in water (D10W), dextrose 10 % in water (D10W), glucagon (human recombinant), glucose, glucose, hydrALAZINE, insulin aspart U-100, melatonin, ondansetron, oxyCODONE, promethazine, senna-docusate 8.6-50 mg, sodium chloride 0.9%    Review of Systems   Constitutional: Negative for chills and fever.   HENT: Negative for facial swelling and hearing loss.    Respiratory: Negative for cough and shortness of breath.    Cardiovascular: Positive for leg swelling.   Gastrointestinal: Negative for nausea and vomiting.   Musculoskeletal: Negative for arthralgias and joint swelling.   Skin: Positive for wound. Negative for rash.   Psychiatric/Behavioral: Negative for agitation and confusion.     Objective:     Vital Signs (Most Recent):  Temp: 98.5 °F (36.9 °C) (12/05/19 0817)  Pulse: (!) 49 (12/05/19 0817)  Resp: 16 (12/05/19 0518)  BP: (!) 180/81 (12/05/19 0817)  SpO2: 97 % (12/05/19 0817) Vital Signs (24h Range):  Temp:  [98.2 °F (36.8 °C)-98.8 °F (37.1 °C)] 98.5 °F (36.9 °C)  Pulse:  [41-65] 49  Resp:  [15-16] 16  SpO2:  [97 %-99 %] 97 %  BP: (133-194)/(63-81) 180/81     Weight: 117.9 kg (260 lb)  Body mass index is 38.4 kg/m².    Foot Exam    General  Orientation: alert and oriented to person, place, and time   Affect: appropriate       Right Foot/Ankle     Inspection and Palpation  Ecchymosis: none    Neurovascular  Dorsalis pedis: absent  Posterior tibial: absent  Saphenous nerve sensation: diminished  Tibial nerve sensation: diminished  Superficial peroneal nerve sensation:  diminished  Deep peroneal nerve sensation: diminished  Sural nerve sensation: diminished      Left Foot/Ankle      Inspection and Palpation  Ecchymosis: none    Neurovascular  Saphenous nerve sensation: diminished  Tibial nerve sensation: diminished  Superficial peroneal nerve sensation: diminished  Deep peroneal nerve sensation: diminished  Sural nerve sensation: diminished            Laboratory:  A1C:   Recent Labs   Lab 12/05/19  0542   HGBA1C 9.8*     CBC:   Recent Labs   Lab 12/05/19  0542   WBC 6.11   RBC 3.58*   HGB 10.0*   HCT 32.6*      MCV 91   MCH 27.9   MCHC 30.7*     CMP:   Recent Labs   Lab 12/04/19  1734 12/05/19  0542   * 101   CALCIUM 9.2 8.9   ALBUMIN 3.1*  --    PROT 7.4  --     142   K 4.7 3.9   CO2 26 28    105   BUN 19 18   CREATININE 1.3 1.2   ALKPHOS 72  --    ALT 22  --    AST 23  --    BILITOT 0.3  --      CRP: No results for input(s): CRP in the last 168 hours.  ESR: No results for input(s): SEDRATE in the last 168 hours.  Prealbumin: No results for input(s): PREALBUMIN in the last 48 hours.  Wound Cultures: No results for input(s): LABAERO in the last 4320 hours.  Microbiology Results (last 7 days)     ** No results found for the last 168 hours. **          Diagnostic Results:  Imaging Results          US Lower Extremity Arteries Bilateral (Final result)  Result time 12/04/19 21:39:28    Final result by Chilo Paige MD (12/04/19 21:39:28)                 Impression:      Right-sided arterial waveforms appear relatively well preserved.    Left arterial waveforms are monophasic beginning in the left superficial femoral artery, but without visualized stenosis or occlusion.    Electronically signed by resident: Trip Oliveros  Date:    12/04/2019  Time:    20:09    Electronically signed by: Chilo Paige MD  Date:    12/04/2019  Time:    21:39             Narrative:    EXAMINATION:  US LOWER EXTREMITY ARTERIES BILATERAL    CLINICAL HISTORY:  Type 2 diabetes  mellitus with foot ulcer    TECHNIQUE:  Ultrasound arterial lower extremity bilateral    COMPARISON:  03/19/2012    FINDINGS:  Right lower extremity.    CFA : 137 cm/sec, triphasic    DFA: 98.8 cm/sec, biphasic    Prox SFA: 160 cm/sec, triphasic    Mid SFA : 155 cm/sec, triphasic    Dist SFA : 151 cm/sec, triphasic    Pop A: : 202 cm/sec, triphasic    CHRISTIAN: 198 cm/sec, monophasic    Peroneal A: 124 cm/sec, monophasic    PTA: 67 cm/sec, monophasic    Mild spectral broadening in the calf arteries.    Left lower extremity    CFA: 184 cm/sec, monophasic    DFA: 105 cm/sec, triphasic    Prox SFA: 136 cm/sec, monophasic    Mid SFA: 166 cm/sec, monophasic    Dist SFA: 198 cm/sec, monophasic    Pop A: 154 cm/sec, monophasic    CHRISTIAN: 133 cm/sec, monophasic    Peroneal A: 84 cm/sec, monophasic    PTA: 108 cm/sec, monophasic    Mild spectral broadening of the calf arteries.                               X-Ray Foot Complete Right (Final result)  Result time 12/04/19 18:20:20    Final result by Keerthi Mcguire MD (12/04/19 18:20:20)                 Impression:      1. The 5th metatarsal findings may be related to previous trauma versus surgery.  2. Findings suggestive of air within the soft tissues about the 5th metatarsal.  A soft tissue defect is also identified in the region of the posterior calcaneus.  These may be sites of ulceration for the patient.  No other soft tissue abnormalities that would be compatible with ulceration.  3. No evidence on this exam for osteomyelitis.      Electronically signed by: Keerthi Mcguire MD  Date:    12/04/2019  Time:    18:20             Narrative:    EXAMINATION:  XR FOOT COMPLETE 3 VIEW RIGHT    CLINICAL HISTORY:  . Non-pressure chronic ulcer of other part of unspecified foot with unspecified severity    TECHNIQUE:  AP, lateral, and oblique views of the right foot were performed.    COMPARISON:  08/31/2006    FINDINGS:  There is diffuse osteopenia..  The lateral radiograph is limited  by positioning.  The 5th metatarsal diaphysis is irregular in its shape.  No acute fractures are identified.  There is no cortical disruption of this bone.  Furthermore, the remaining osseous structures show no evidence of fracture or cortical destruction.  The soft tissues are remarkable for advanced calcified peripheral vascular atherosclerosis.  There are findings suggestive of subcutaneous air in the soft tissues adjacent to the 5th metatarsal.  This is only appreciated on the AP view and not supported on the remaining views.  Findings suggestive of a soft tissue defect in the region of the posterior calcaneus.  The joint spaces are relatively preserved.                                  Clinical Findings:  Necrotic eschar overlying the medial distal.  No drainage, No purulence, erythema, or malodor      Hallux on the right foot. Show parts amputation on the left ft.  No open wounds.    Bilateral lower extremity edema.  Atrophic skin bilaterally with absent pedal hair growth and nonpalpable pedal pulses.

## 2019-12-05 NOTE — PLAN OF CARE
Problem: Physical Therapy Goal  Goal: Physical Therapy Goal  Outcome: Met     PT evaluation completed and patient demonstrates safe mobility.  Patient appears to be at baseline for ambulation, transfers and bed mobility.  Patient able to safely ambulate 15 ft with SBA with rolling walker and flexed posture.  Patient with good understanding of current functional level and is without further skilled PT needs at this time.      Trip Davis PT, DPT  12/5/2019  Pager #: [phone]

## 2019-12-05 NOTE — ASSESSMENT & PLAN NOTE
Saji Castañeda is a 69 y.o. male with necrotic eschar right hallux.  Stable.    -WBC within normal limits.  Ordered ESR and CRP.  -Imaging: Xray showing no concerning osseous pathology.  -Discussed treatment options with patient.  Plan on local wound care.  Would recommend against antibiotics at this time.  There are concerns for  possible microvascular disease.  Would appreciate recommendations from vascular surgery.  Vascular surgery consulted.  -ft is not infected, no surgical intervention indicated at this time.  Patient okay to be discharged from podiatry point of view pending vascular recommendations.  -patient with Betadine, nursing to paint with Betadine daily  -Elevate legs for edema control    DC Instructions:  Patient is to follow up with his podiatrist within 10 days of discharge.

## 2019-12-05 NOTE — CONSULTS
[facility]  Podiatry  Consult Note    Patient Name: Saji Castañeda  MRN: [id number]  Admission Date: 12/4/2019  Hospital Length of Stay: 1 days  Attending Physician: Juan Munson MD  Primary Care Provider: Lalo Heath MD     Inpatient consult to Podiatry  Consult performed by: Nael Villarreal MD  Consult ordered by: JANE Gibson MD        Subjective:     History of Present Illness:  Saji Castañeda is a 69 y.o. male who  has a past medical history of Arthritis, Diabetes mellitus, Diabetes mellitus, type 2, Hyperlipidemia, and Osteomyelitis.    Patient Admited for right foot ischemic ulcer.  Has history of left foot choparts amputation.  He states he is regularly seen by podiatrist on [facility].  States that he was seen yesterday by the podiatrist told that his right great toe is turning gangrenous.  States that some weeks ago he had a blister form on his right great toe that since was necrotic.  He states that he did notice some serous fluid.  But denies any purulence.  Denies fever chills nausea vomiting. States that he walks with a walker with a special shoe on the left leg, Complains of bilateral leg swelling that is so bad that he can't put on his shoe on his left leg.        Subjective:             Scheduled Meds:   aspirin  81 mg Oral Daily    atorvastatin  40 mg Oral QHS    difluprednate  1 drop Left Eye QID    enoxaparin  40 mg Subcutaneous Daily    insulin aspart U-100  5 Units Subcutaneous TIDWM    insulin detemir U-100  20 Units Subcutaneous BID    losartan  50 mg Oral Daily    tamsulosin  0.4 mg Oral Daily     Continuous Infusions:  PRN Meds:acetaminophen, albuterol-ipratropium, dextrose 10 % in water (D10W), dextrose 10 % in water (D10W), glucagon (human recombinant), glucose, glucose, hydrALAZINE, insulin aspart U-100, melatonin, ondansetron, oxyCODONE, promethazine, senna-docusate 8.6-50 mg, sodium chloride 0.9%    Review of Systems   Constitutional:  Negative for chills and fever.   HENT: Negative for facial swelling and hearing loss.    Respiratory: Negative for cough and shortness of breath.    Cardiovascular: Positive for leg swelling.   Gastrointestinal: Negative for nausea and vomiting.   Musculoskeletal: Negative for arthralgias and joint swelling.   Skin: Positive for wound. Negative for rash.   Psychiatric/Behavioral: Negative for agitation and confusion.     Objective:     Vital Signs (Most Recent):  Temp: 98.5 °F (36.9 °C) (12/05/19 0817)  Pulse: (!) 49 (12/05/19 0817)  Resp: 16 (12/05/19 0518)  BP: (!) 180/81 (12/05/19 0817)  SpO2: 97 % (12/05/19 0817) Vital Signs (24h Range):  Temp:  [98.2 °F (36.8 °C)-98.8 °F (37.1 °C)] 98.5 °F (36.9 °C)  Pulse:  [41-65] 49  Resp:  [15-16] 16  SpO2:  [97 %-99 %] 97 %  BP: (133-194)/(63-81) 180/81     Weight: 117.9 kg (260 lb)  Body mass index is 38.4 kg/m².    Foot Exam    General  Orientation: alert and oriented to person, place, and time   Affect: appropriate       Right Foot/Ankle     Inspection and Palpation  Ecchymosis: none    Neurovascular  Dorsalis pedis: absent  Posterior tibial: absent  Saphenous nerve sensation: diminished  Tibial nerve sensation: diminished  Superficial peroneal nerve sensation: diminished  Deep peroneal nerve sensation: diminished  Sural nerve sensation: diminished      Left Foot/Ankle      Inspection and Palpation  Ecchymosis: none    Neurovascular  Saphenous nerve sensation: diminished  Tibial nerve sensation: diminished  Superficial peroneal nerve sensation: diminished  Deep peroneal nerve sensation: diminished  Sural nerve sensation: diminished            Laboratory:  A1C:   Recent Labs   Lab 12/05/19  0542   HGBA1C 9.8*     CBC:   Recent Labs   Lab 12/05/19  0542   WBC 6.11   RBC 3.58*   HGB 10.0*   HCT 32.6*      MCV 91   MCH 27.9   MCHC 30.7*     CMP:   Recent Labs   Lab 12/04/19  1734 12/05/19  0542   * 101   CALCIUM 9.2 8.9   ALBUMIN 3.1*  --    PROT 7.4  --    NA  141 142   K 4.7 3.9   CO2 26 28    105   BUN 19 18   CREATININE 1.3 1.2   ALKPHOS 72  --    ALT 22  --    AST 23  --    BILITOT 0.3  --      CRP: No results for input(s): CRP in the last 168 hours.  ESR: No results for input(s): SEDRATE in the last 168 hours.  Prealbumin: No results for input(s): PREALBUMIN in the last 48 hours.  Wound Cultures: No results for input(s): LABAERO in the last 4320 hours.  Microbiology Results (last 7 days)     ** No results found for the last 168 hours. **          Diagnostic Results:  Imaging Results          US Lower Extremity Arteries Bilateral (Final result)  Result time 12/04/19 21:39:28    Final result by Chilo Paige MD (12/04/19 21:39:28)                 Impression:      Right-sided arterial waveforms appear relatively well preserved.    Left arterial waveforms are monophasic beginning in the left superficial femoral artery, but without visualized stenosis or occlusion.    Electronically signed by resident: Trip Oliveros  Date:    12/04/2019  Time:    20:09    Electronically signed by: Chilo Paige MD  Date:    12/04/2019  Time:    21:39             Narrative:    EXAMINATION:  US LOWER EXTREMITY ARTERIES BILATERAL    CLINICAL HISTORY:  Type 2 diabetes mellitus with foot ulcer    TECHNIQUE:  Ultrasound arterial lower extremity bilateral    COMPARISON:  03/19/2012    FINDINGS:  Right lower extremity.    CFA : 137 cm/sec, triphasic    DFA: 98.8 cm/sec, biphasic    Prox SFA: 160 cm/sec, triphasic    Mid SFA : 155 cm/sec, triphasic    Dist SFA : 151 cm/sec, triphasic    Pop A: : 202 cm/sec, triphasic    CHRISTIAN: 198 cm/sec, monophasic    Peroneal A: 124 cm/sec, monophasic    PTA: 67 cm/sec, monophasic    Mild spectral broadening in the calf arteries.    Left lower extremity    CFA: 184 cm/sec, monophasic    DFA: 105 cm/sec, triphasic    Prox SFA: 136 cm/sec, monophasic    Mid SFA: 166 cm/sec, monophasic    Dist SFA: 198 cm/sec, monophasic    Pop A: 154 cm/sec,  monophasic    CHRISTIAN: 133 cm/sec, monophasic    Peroneal A: 84 cm/sec, monophasic    PTA: 108 cm/sec, monophasic    Mild spectral broadening of the calf arteries.                               X-Ray Foot Complete Right (Final result)  Result time 12/04/19 18:20:20    Final result by Keerthi Mcguire MD (12/04/19 18:20:20)                 Impression:      1. The 5th metatarsal findings may be related to previous trauma versus surgery.  2. Findings suggestive of air within the soft tissues about the 5th metatarsal.  A soft tissue defect is also identified in the region of the posterior calcaneus.  These may be sites of ulceration for the patient.  No other soft tissue abnormalities that would be compatible with ulceration.  3. No evidence on this exam for osteomyelitis.      Electronically signed by: Keerthi Mcguire MD  Date:    12/04/2019  Time:    18:20             Narrative:    EXAMINATION:  XR FOOT COMPLETE 3 VIEW RIGHT    CLINICAL HISTORY:  . Non-pressure chronic ulcer of other part of unspecified foot with unspecified severity    TECHNIQUE:  AP, lateral, and oblique views of the right foot were performed.    COMPARISON:  08/31/2006    FINDINGS:  There is diffuse osteopenia..  The lateral radiograph is limited by positioning.  The 5th metatarsal diaphysis is irregular in its shape.  No acute fractures are identified.  There is no cortical disruption of this bone.  Furthermore, the remaining osseous structures show no evidence of fracture or cortical destruction.  The soft tissues are remarkable for advanced calcified peripheral vascular atherosclerosis.  There are findings suggestive of subcutaneous air in the soft tissues adjacent to the 5th metatarsal.  This is only appreciated on the AP view and not supported on the remaining views.  Findings suggestive of a soft tissue defect in the region of the posterior calcaneus.  The joint spaces are relatively preserved.                                  Clinical  Findings:  Necrotic eschar overlying the medial distal.  No drainage, No purulence, erythema, or malodor      Hallux on the right foot. Show parts amputation on the left ft.  No open wounds.    Bilateral lower extremity edema.  Atrophic skin bilaterally with absent pedal hair growth and nonpalpable pedal pulses.              Assessment/Plan:     * Ischemic ulcer diabetic foot   Saji Castañeda is a 69 y.o. male with necrotic eschar right hallux.  Stable.    -WBC within normal limits.  Ordered ESR and CRP.  -Imaging: Xray showing no concerning osseous pathology.  -Discussed treatment options with patient.  Plan on local wound care.  Would recommend against antibiotics at this time.  There are concerns for  possible microvascular disease.  Would appreciate recommendations from vascular surgery.  Vascular surgery consulted.  -ft is not infected, no surgical intervention indicated at this time.  Patient okay to be discharged from podiatry point of view pending vascular recommendations.  -patient with Betadine, nursing to paint with Betadine daily  -Elevate legs for edema control    DC Instructions:  Patient is to follow up with his podiatrist within 10 days of discharge.              Anemia of chronic disease  Per primary      Type 2 diabetes, uncontrolled, with neuropathy  A1c 9.8  Per primary          Thank you for your consult.     Nael Soto MD  Podiatry  [facility]

## 2019-12-05 NOTE — H&P
"[facility] Medicine  History & Physical    Patient Name: Saji Castañeda  MRN: [id number]  Admission Date: 12/4/2019  Attending Physician: JANE Gibson MD   Primary Care Provider: Lalo Heath MD    [facility] Medicine Team: [facility] MED B ADAM Gibson MD     Patient information was obtained from patient, past medical records and ER records.     Subjective:     Principal Problem:Ischemic ulcer diabetic foot    Chief Complaint:   Chief Complaint   Patient presents with    Wound Infection     arrived via [facility] from home with c/o infection to right lower leg, possibly gangrene, diabetic        HPI: Mr. Castañeda is a very nice 68yo man with a past medical history of poorly controlled DM2 and HTN.  This led to left foot amputation in the past.  He follows closely with Podiatry as well.  He states that about 2 weeks ago he developed a blister to the right great toe medial aspect.  He put some salve on it and it gradually, "dried up and scabbed over.  I thought it was healing."  He tells me that he followed up with his Podiatrist today, who sent him to the ED.  He denies any pain, redness or drainage to the area.  He denies any fever or chills.  I cannot find any Podiatry notes in our Epic recently.  It does appear a home health nurse from [facility] was trying to get him a clinic appointment with Vascular surgery STAT (multiple telephone encounters for this over the past two days).    Past Medical History:   Diagnosis Date    Arthritis     legs    Diabetes mellitus     Diabetes mellitus, type 2     Hyperlipidemia     Osteomyelitis        Past Surgical History:   Procedure Laterality Date    FOOT AMPUTATION  October 2010    left high midfoot amputation       Review of patient's allergies indicates:  No Known Allergies    No current facility-administered medications on file prior to encounter.      Current Outpatient Medications on File Prior to Encounter   Medication Sig    " "ammonium lactate (LAC-HYDRIN) 12 % lotion     aspirin (ECOTRIN) 81 MG EC tablet Take 81 mg by mouth once daily.    atorvastatin (LIPITOR) 20 MG tablet TK 1 T PO QD    atorvastatin (LIPITOR) 40 MG tablet Take 40 mg by mouth every evening.     BD ULTRA-FINE ADITYA PEN NEEDLE 32 gauge x 5/32" Ndle USE FOUR TIMES DAILY WITH MEALS AND NIGHTLY    BESIVANCE 0.6 % DrpS INSTILL 1 DROP INTO THE OS TID    blood sugar diagnostic (CONTOUR TEST STRIPS) Strp Inject 1 strip into the skin 2 (two) times daily before meals.    DUREZOL 0.05 % Drop ophthalmic solution INSTILL 1 DROP INTO THE OS QID    econazole nitrate 1 % cream APPLY EXTERNALLY TO THE AFFECTED AREA ONCE DAILY    ILEVRO 0.3 % DrpS INSTILL 1 DROP INTO THE OS QD    JANUVIA 100 mg Tab TAKE 1 TABLET BY MOUTH ONCE DAILY    LEVEMIR FLEXTOUCH U-100 INSULN 100 unit/mL (3 mL) InPn pen INJECT 25 UNITS UNDER THE SKIN TWICE DAILY    losartan (COZAAR) 50 MG tablet     metformin (GLUCOPHAGE) 500 MG tablet Take 2 tablets (1,000 mg total) by mouth 2 (two) times daily with meals.    MICROLET LANCET Misc     sitagliptin (JANUVIA) 100 MG Tab Take 1 tablet (100 mg total) by mouth once daily.    tamsulosin (FLOMAX) 0.4 mg Cp24 Take 0.4 mg by mouth once daily.    triamcinolone acetonide 0.1% (KENALOG) 0.1 % cream APPLY EXTERNALLY TO THE AFFECTED AREA TWICE DAILY AS DIRECTED    TRUE METRIX GLUCOSE TEST STRIP Strp USE TO TEST TWICE DAILY     Family History     Problem Relation (Age of Onset)    Cancer Brother    Diabetes Mother, Sister    Heart disease Mother    Stroke Sister        Tobacco Use    Smoking status: Never Smoker    Smokeless tobacco: Never Used   Substance and Sexual Activity    Alcohol use: No     Comment: occassional    Drug use: No    Sexual activity: Not Currently     Review of Systems   Constitutional: Negative for chills, fatigue and fever.   HENT: Negative for congestion.    Eyes: Negative for itching.   Respiratory: Negative for cough and shortness of " breath.    Cardiovascular: Negative for chest pain.   Gastrointestinal: Positive for diarrhea. Negative for abdominal pain, constipation, nausea and vomiting.   Endocrine: Negative for cold intolerance.   Genitourinary: Negative for dysuria.   Musculoskeletal: Negative for myalgias.   Skin: Positive for color change, rash and wound.   Allergic/Immunologic: Negative for immunocompromised state.   Neurological: Negative for dizziness and weakness.   Hematological: Does not bruise/bleed easily.   Psychiatric/Behavioral: Negative for confusion. The patient is not nervous/anxious.      Objective:     Vital Signs (Most Recent):  Temp: 98.2 °F (36.8 °C) (12/04/19 2252)  Pulse: (!) 42 (12/04/19 2252)  Resp: 16 (12/04/19 2252)  BP: (!) 194/77 (12/04/19 2252)  SpO2: 99 % (12/04/19 2252) Vital Signs (24h Range):  Temp:  [98.2 °F (36.8 °C)-98.8 °F (37.1 °C)] 98.2 °F (36.8 °C)  Pulse:  [42-65] 42  Resp:  [15-16] 16  SpO2:  [98 %-99 %] 99 %  BP: (153-194)/(63-77) 194/77     Weight: 117.9 kg (260 lb)  Body mass index is 38.4 kg/m².    Physical Exam   Constitutional: He is oriented to person, place, and time. He appears well-developed and well-nourished.  Non-toxic appearance. He does not have a sickly appearance. He does not appear ill. No distress.   HENT:   Head: Normocephalic and atraumatic.   Nose: Nose normal.   Mouth/Throat: Oropharynx is clear and moist. Abnormal dentition. No oropharyngeal exudate.   Eyes: Pupils are equal, round, and reactive to light. Conjunctivae and EOM are normal. Right eye exhibits no discharge. Left eye exhibits no discharge. No scleral icterus.   Arroyo sclerae   Neck: Normal range of motion. Neck supple. No JVD present. No tracheal deviation present. No thyromegaly present.   Cardiovascular: Normal rate, regular rhythm, normal heart sounds and intact distal pulses. Exam reveals no gallop and no friction rub.   No murmur heard.  Pulmonary/Chest: Effort normal and breath sounds normal. No stridor. No  tachypnea and no bradypnea. No respiratory distress. He has no wheezes. He has no rhonchi. He has no rales. He exhibits no tenderness.   Abdominal: Soft. Bowel sounds are normal. He exhibits no distension and no mass. There is no tenderness. There is no rebound and no guarding.   Genitourinary:   Genitourinary Comments: No fung in place   Musculoskeletal: Normal range of motion. He exhibits no edema or tenderness.   Left BKA with hypertrophic, cornified skin.   Lymphadenopathy:     He has no cervical adenopathy.   No peripheral edema   Neurological: He is alert and oriented to person, place, and time. He displays normal reflexes. No cranial nerve deficit. He exhibits normal muscle tone. Coordination normal. GCS eye subscore is 4. GCS verbal subscore is 5. GCS motor subscore is 6.   Skin: Skin is warm and dry. No rash noted. No erythema. No pallor.   Medial great toe ulcer with hypertrophic dried dead tissue around it.  No surrounding redness or drainage to suggest active infection.     Psychiatric: He has a normal mood and affect. His speech is normal and behavior is normal. Judgment and thought content normal. Cognition and memory are normal.   Nursing note and vitals reviewed.        CRANIAL NERVES     CN III, IV, VI   Pupils are equal, round, and reactive to light.  Extraocular motions are normal.       Significant Labs:   Recent Results (from the past 24 hour(s))   POCT glucose    Collection Time: 12/04/19  5:33 PM   Result Value Ref Range    POCT Glucose 140 (H) 70 - 110 mg/dL   CBC auto differential    Collection Time: 12/04/19  5:34 PM   Result Value Ref Range    WBC 7.14 3.90 - 12.70 K/uL    RBC 3.90 (L) 4.60 - 6.20 M/uL    Hemoglobin 10.8 (L) 14.0 - 18.0 g/dL    Hematocrit 35.9 (L) 40.0 - 54.0 %    Mean Corpuscular Volume 92 82 - 98 fL    Mean Corpuscular Hemoglobin 27.7 27.0 - 31.0 pg    Mean Corpuscular Hemoglobin Conc 30.1 (L) 32.0 - 36.0 g/dL    RDW 15.2 (H) 11.5 - 14.5 %    Platelets 261 150 - 350  K/uL    MPV 10.1 9.2 - 12.9 fL    Immature Granulocytes 0.1 0.0 - 0.5 %    Gran # (ANC) 4.1 1.8 - 7.7 K/uL    Immature Grans (Abs) 0.01 0.00 - 0.04 K/uL    Lymph # 2.0 1.0 - 4.8 K/uL    Mono # 0.7 0.3 - 1.0 K/uL    Eos # 0.3 0.0 - 0.5 K/uL    Baso # 0.04 0.00 - 0.20 K/uL    nRBC 0 0 /100 WBC    Gran% 56.9 38.0 - 73.0 %    Lymph% 28.4 18.0 - 48.0 %    Mono% 10.4 4.0 - 15.0 %    Eosinophil% 3.6 0.0 - 8.0 %    Basophil% 0.6 0.0 - 1.9 %    Differential Method Automated    Comprehensive metabolic panel    Collection Time: 12/04/19  5:34 PM   Result Value Ref Range    Sodium 141 136 - 145 mmol/L    Potassium 4.7 3.5 - 5.1 mmol/L    Chloride 105 95 - 110 mmol/L    CO2 26 23 - 29 mmol/L    Glucose 132 (H) 70 - 110 mg/dL    BUN, Bld 19 8 - 23 mg/dL    Creatinine 1.3 0.5 - 1.4 mg/dL    Calcium 9.2 8.7 - 10.5 mg/dL    Total Protein 7.4 6.0 - 8.4 g/dL    Albumin 3.1 (L) 3.5 - 5.2 g/dL    Total Bilirubin 0.3 0.1 - 1.0 mg/dL    Alkaline Phosphatase 72 55 - 135 U/L    AST 23 10 - 40 U/L    ALT 22 10 - 44 U/L    Anion Gap 10 8 - 16 mmol/L    eGFR if African American >60.0 >60 mL/min/1.73 m^2    eGFR if non  55.3 (A) >60 mL/min/1.73 m^2         Significant Imaging:   XR FOOT COMPLETE 3 VIEW RIGHT    CLINICAL HISTORY:  . Non-pressure chronic ulcer of other part of unspecified foot with unspecified severity    TECHNIQUE:  AP, lateral, and oblique views of the right foot were performed.    COMPARISON:  08/31/2006    FINDINGS:  There is diffuse osteopenia..  The lateral radiograph is limited by positioning.  The 5th metatarsal diaphysis is irregular in its shape.  No acute fractures are identified.  There is no cortical disruption of this bone.  Furthermore, the remaining osseous structures show no evidence of fracture or cortical destruction.  The soft tissues are remarkable for advanced calcified peripheral vascular atherosclerosis.  There are findings suggestive of subcutaneous air in the soft tissues adjacent to the  5th metatarsal.  This is only appreciated on the AP view and not supported on the remaining views.  Findings suggestive of a soft tissue defect in the region of the posterior calcaneus.  The joint spaces are relatively preserved.      Impression       1. The 5th metatarsal findings may be related to previous trauma versus surgery.  2. Findings suggestive of air within the soft tissues about the 5th metatarsal.  A soft tissue defect is also identified in the region of the posterior calcaneus.  These may be sites of ulceration for the patient.  No other soft tissue abnormalities that would be compatible with ulceration.  3. No evidence on this exam for osteomyelitis.      Electronically signed by: Keerthi Mcguire MD  Date: 12/04/2019  Time: 18:20       Assessment/Plan:      Ischemic ulcer diabetic foot, right great toe  -Podiatry consult  -No georgette redness or drainage to suggest acute infection   -Will hold on antibiotics for now  -Ordered bilateral arterial dopplers with MARIO   -Vascular surgery consult if shows macro-vessel disease  -aspirin EC tablet 81 mg, 81 mg, Oral, Daily  -atorvastatin tablet 40 mg, 40 mg, Oral, QHS    Diabetes mellitus 2, uncontrolled, neuropathy  -insulin detemir U-100 pen 20 Units, 20 Units, Subcutaneous, BID   -On 25U BID at home  -Aspart 5U TID with meals  -Check HgA1c  -Low dose SSI protocol    Essential hypertension  -losartan tablet 50 mg, 50 mg, Oral, Daily  -hydrALAZINE tablet 25 mg, 25 mg, Oral, Q8H PRN    Chronic kidney disease, stage III  -Stable at baseline to monitor  -Avoid IV dye    Anemia of chronic disease  -Check B12, folate, Fe studies  -At baseline    BPH without obstruction  -tamsulosin 24 hr capsule 0.4 mg, 0.4 mg, Oral, Daily    VTE Risk Mitigation (From admission, onward)         Ordered     enoxaparin injection 40 mg  Daily      12/04/19 2250     IP VTE HIGH RISK PATIENT  Once      12/04/19 2250                  W Vidal Gibson MD  Department of Hospital Medicine    [facility]

## 2019-12-06 VITALS
HEIGHT: 69 IN | SYSTOLIC BLOOD PRESSURE: 189 MMHG | OXYGEN SATURATION: 100 % | RESPIRATION RATE: 18 BRPM | BODY MASS INDEX: 38.51 KG/M2 | TEMPERATURE: 98 F | HEART RATE: 63 BPM | WEIGHT: 260 LBS | DIASTOLIC BLOOD PRESSURE: 86 MMHG

## 2019-12-06 PROBLEM — I44.1 MOBITZ (TYPE) I (WENCKEBACH'S) ATRIOVENTRICULAR BLOCK: Status: ACTIVE | Noted: 2019-12-06

## 2019-12-06 LAB
ANION GAP SERPL CALC-SCNC: 6 MMOL/L (ref 8–16)
BASOPHILS # BLD AUTO: 0.03 K/UL (ref 0–0.2)
BASOPHILS NFR BLD: 0.5 % (ref 0–1.9)
BUN SERPL-MCNC: 16 MG/DL (ref 8–23)
CALCIUM SERPL-MCNC: 9 MG/DL (ref 8.7–10.5)
CHLORIDE SERPL-SCNC: 105 MMOL/L (ref 95–110)
CO2 SERPL-SCNC: 29 MMOL/L (ref 23–29)
CREAT SERPL-MCNC: 1.1 MG/DL (ref 0.5–1.4)
DIFFERENTIAL METHOD: ABNORMAL
EOSINOPHIL # BLD AUTO: 0.3 K/UL (ref 0–0.5)
EOSINOPHIL NFR BLD: 5.8 % (ref 0–8)
ERYTHROCYTE [DISTWIDTH] IN BLOOD BY AUTOMATED COUNT: 15.1 % (ref 11.5–14.5)
EST. GFR  (AFRICAN AMERICAN): >60 ML/MIN/1.73 M^2
EST. GFR  (NON AFRICAN AMERICAN): >60 ML/MIN/1.73 M^2
GLUCOSE SERPL-MCNC: 134 MG/DL (ref 70–110)
HCT VFR BLD AUTO: 33.7 % (ref 40–54)
HGB BLD-MCNC: 10.3 G/DL (ref 14–18)
IMM GRANULOCYTES # BLD AUTO: 0.01 K/UL (ref 0–0.04)
IMM GRANULOCYTES NFR BLD AUTO: 0.2 % (ref 0–0.5)
LYMPHOCYTES # BLD AUTO: 1.8 K/UL (ref 1–4.8)
LYMPHOCYTES NFR BLD: 32.7 % (ref 18–48)
MAGNESIUM SERPL-MCNC: 1.9 MG/DL (ref 1.6–2.6)
MCH RBC QN AUTO: 27.9 PG (ref 27–31)
MCHC RBC AUTO-ENTMCNC: 30.6 G/DL (ref 32–36)
MCV RBC AUTO: 91 FL (ref 82–98)
MONOCYTES # BLD AUTO: 0.6 K/UL (ref 0.3–1)
MONOCYTES NFR BLD: 11.2 % (ref 4–15)
NEUTROPHILS # BLD AUTO: 2.8 K/UL (ref 1.8–7.7)
NEUTROPHILS NFR BLD: 49.6 % (ref 38–73)
NRBC BLD-RTO: 0 /100 WBC
PHOSPHATE SERPL-MCNC: 3.2 MG/DL (ref 2.7–4.5)
PLATELET # BLD AUTO: 238 K/UL (ref 150–350)
PMV BLD AUTO: 10.3 FL (ref 9.2–12.9)
POCT GLUCOSE: 105 MG/DL (ref 70–110)
POCT GLUCOSE: 161 MG/DL (ref 70–110)
POTASSIUM SERPL-SCNC: 4.3 MMOL/L (ref 3.5–5.1)
RBC # BLD AUTO: 3.69 M/UL (ref 4.6–6.2)
SODIUM SERPL-SCNC: 140 MMOL/L (ref 136–145)
WBC # BLD AUTO: 5.54 K/UL (ref 3.9–12.7)

## 2019-12-06 PROCEDURE — 96372 THER/PROPH/DIAG INJ SC/IM: CPT

## 2019-12-06 PROCEDURE — 99223 1ST HOSP IP/OBS HIGH 75: CPT | Mod: ,,, | Performed by: PODIATRIST

## 2019-12-06 PROCEDURE — 93010 EKG 12-LEAD: ICD-10-PCS | Mod: 76,,, | Performed by: INTERNAL MEDICINE

## 2019-12-06 PROCEDURE — 25000003 PHARM REV CODE 250: Performed by: INTERNAL MEDICINE

## 2019-12-06 PROCEDURE — 99238 HOSP IP/OBS DSCHRG MGMT 30/<: CPT | Mod: ,,, | Performed by: INTERNAL MEDICINE

## 2019-12-06 PROCEDURE — 85025 COMPLETE CBC W/AUTO DIFF WBC: CPT

## 2019-12-06 PROCEDURE — 93010 ELECTROCARDIOGRAM REPORT: CPT | Mod: 76,,, | Performed by: INTERNAL MEDICINE

## 2019-12-06 PROCEDURE — 36415 COLL VENOUS BLD VENIPUNCTURE: CPT

## 2019-12-06 PROCEDURE — 93005 ELECTROCARDIOGRAM TRACING: CPT

## 2019-12-06 PROCEDURE — G0378 HOSPITAL OBSERVATION PER HR: HCPCS

## 2019-12-06 PROCEDURE — 80048 BASIC METABOLIC PNL TOTAL CA: CPT

## 2019-12-06 PROCEDURE — 94761 N-INVAS EAR/PLS OXIMETRY MLT: CPT

## 2019-12-06 PROCEDURE — 83735 ASSAY OF MAGNESIUM: CPT

## 2019-12-06 PROCEDURE — 99238 PR HOSPITAL DISCHARGE DAY,<30 MIN: ICD-10-PCS | Mod: ,,, | Performed by: INTERNAL MEDICINE

## 2019-12-06 PROCEDURE — 99223 PR INITIAL HOSPITAL CARE,LEVL III: ICD-10-PCS | Mod: ,,, | Performed by: PODIATRIST

## 2019-12-06 PROCEDURE — 84100 ASSAY OF PHOSPHORUS: CPT

## 2019-12-06 RX ADMIN — INSULIN ASPART 5 UNITS: 100 INJECTION, SOLUTION INTRAVENOUS; SUBCUTANEOUS at 07:12

## 2019-12-06 RX ADMIN — DUREZOL 1 DROP: 0.5 EMULSION OPHTHALMIC at 09:12

## 2019-12-06 RX ADMIN — ASPIRIN 81 MG: 81 TABLET, DELAYED RELEASE ORAL at 09:12

## 2019-12-06 RX ADMIN — DUREZOL 1 DROP: 0.5 EMULSION OPHTHALMIC at 01:12

## 2019-12-06 RX ADMIN — LOSARTAN POTASSIUM 50 MG: 25 TABLET ORAL at 09:12

## 2019-12-06 RX ADMIN — TAMSULOSIN HYDROCHLORIDE 0.4 MG: 0.4 CAPSULE ORAL at 09:12

## 2019-12-06 RX ADMIN — INSULIN DETEMIR 20 UNITS: 100 INJECTION, SOLUTION SUBCUTANEOUS at 09:12

## 2019-12-06 RX ADMIN — INSULIN ASPART 5 UNITS: 100 INJECTION, SOLUTION INTRAVENOUS; SUBCUTANEOUS at 12:12

## 2019-12-06 NOTE — PLAN OF CARE
12/06/19 0850   Post-Acute Status   Post-Acute Authorization Other   Other Status No Post-Acute Service Needs

## 2019-12-06 NOTE — HPI
70 yo male with a past medical hx of DM type 2 and HTN s/p Left foot amputation here with dry gangrene of R great toe and has received a Vascular surgery consultation for evaluation of this recently. They noted that he has multiphasic peripheral pulses on this foot and while has no evidence of large vessel disease could have an element of microvascular dysfunction that is contributing to his discoloration of his toe. They recommended medical therapy. The patient has no evidence of infection (afebrile and no WCC). Blood cultures are negative.     Cardiology has been consulted for 2:1 AV block in a patient with a hx of known Mobitz type I block. Had this intermittently on a ambulatory visit in 01/07/13 he had this rhythm and was noted to be asymptomatic. When it occurred today the patient was

## 2019-12-06 NOTE — SUBJECTIVE & OBJECTIVE
"Past Medical History:   Diagnosis Date    Arthritis     legs    Diabetes mellitus     Diabetes mellitus, type 2     Hyperlipidemia     Osteomyelitis        Past Surgical History:   Procedure Laterality Date    FOOT AMPUTATION  October 2010    left high midfoot amputation       Review of patient's allergies indicates:  No Known Allergies    No current facility-administered medications on file prior to encounter.      Current Outpatient Medications on File Prior to Encounter   Medication Sig    ammonium lactate (LAC-HYDRIN) 12 % lotion     aspirin (ECOTRIN) 81 MG EC tablet Take 81 mg by mouth once daily.    atorvastatin (LIPITOR) 20 MG tablet TK 1 T PO QD    atorvastatin (LIPITOR) 40 MG tablet Take 40 mg by mouth every evening.     BD ULTRA-FINE ADITYA PEN NEEDLE 32 gauge x 5/32" Ndle USE FOUR TIMES DAILY WITH MEALS AND NIGHTLY    BESIVANCE 0.6 % DrpS INSTILL 1 DROP INTO THE OS TID    blood sugar diagnostic (CONTOUR TEST STRIPS) Strp Inject 1 strip into the skin 2 (two) times daily before meals.    DUREZOL 0.05 % Drop ophthalmic solution INSTILL 1 DROP INTO THE OS QID    econazole nitrate 1 % cream APPLY EXTERNALLY TO THE AFFECTED AREA ONCE DAILY    ILEVRO 0.3 % DrpS INSTILL 1 DROP INTO THE OS QD    JANUVIA 100 mg Tab TAKE 1 TABLET BY MOUTH ONCE DAILY    LEVEMIR FLEXTOUCH U-100 INSULN 100 unit/mL (3 mL) InPn pen INJECT 25 UNITS UNDER THE SKIN TWICE DAILY    losartan (COZAAR) 50 MG tablet     metformin (GLUCOPHAGE) 500 MG tablet Take 2 tablets (1,000 mg total) by mouth 2 (two) times daily with meals.    MICROLET LANCET Misc     sitagliptin (JANUVIA) 100 MG Tab Take 1 tablet (100 mg total) by mouth once daily.    tamsulosin (FLOMAX) 0.4 mg Cp24 Take 0.4 mg by mouth once daily.    triamcinolone acetonide 0.1% (KENALOG) 0.1 % cream APPLY EXTERNALLY TO THE AFFECTED AREA TWICE DAILY AS DIRECTED    TRUE METRIX GLUCOSE TEST STRIP Strp USE TO TEST TWICE DAILY     Family History     Problem Relation (Age " of Onset)    Cancer Brother    Diabetes Mother, Sister    Heart disease Mother    Stroke Sister        Tobacco Use    Smoking status: Never Smoker    Smokeless tobacco: Never Used   Substance and Sexual Activity    Alcohol use: No     Comment: occassional    Drug use: No    Sexual activity: Not Currently     Review of Systems   All other systems reviewed and are negative.    Objective:     Vital Signs (Most Recent):  Temp: 98.3 °F (36.8 °C) (12/06/19 1141)  Pulse: 63 (12/06/19 1141)  Resp: 18 (12/06/19 1141)  BP: (!) 170/77 (12/06/19 1141)  SpO2: 100 % (12/06/19 1141) Vital Signs (24h Range):  Temp:  [97 °F (36.1 °C)-98.7 °F (37.1 °C)] 98.3 °F (36.8 °C)  Pulse:  [36-76] 63  Resp:  [18] 18  SpO2:  [98 %-100 %] 100 %  BP: (136-195)/(60-86) 170/77     Weight: 117.9 kg (260 lb)  Body mass index is 38.4 kg/m².    SpO2: 100 %  O2 Device (Oxygen Therapy): room air      Intake/Output Summary (Last 24 hours) at 12/6/2019 1148  Last data filed at 12/5/2019 1731  Gross per 24 hour   Intake 240 ml   Output 300 ml   Net -60 ml       Lines/Drains/Airways     Peripherally Inserted Central Catheter Line                 PICC Double Lumen 06/19/14 1639 right brachial 1995 days          Drain                 Closed/Suction Drain Left Foot Other (Comment) 2115 days          Airway                 Airway - Non-Surgical 06/13/17 1422 905 days          Peripheral Intravenous Line                 Peripheral IV - Single Lumen 06/13/17 1342 Left Hand 905 days                Physical Exam   Constitutional: He is oriented to person, place, and time. He appears well-developed and well-nourished.   HENT:   Head: Normocephalic and atraumatic.   Eyes: Pupils are equal, round, and reactive to light. EOM are normal.   Neck: Normal range of motion. Neck supple. No JVD present.   Cardiovascular: Normal rate, regular rhythm and intact distal pulses.   No murmur heard.  Pulmonary/Chest: Effort normal and breath sounds normal.   Abdominal: Soft.  Bowel sounds are normal.   Musculoskeletal: Normal range of motion. He exhibits no edema.   Neurological: He is alert and oriented to person, place, and time.   Skin: Skin is warm.     Palpable pulses on DP and PT on right foot. Warm foot noted    Significant Labs:   CMP   Recent Labs   Lab 12/04/19  1734 12/05/19 0542 12/06/19 0518    142 140   K 4.7 3.9 4.3    105 105   CO2 26 28 29   * 101 134*   BUN 19 18 16   CREATININE 1.3 1.2 1.1   CALCIUM 9.2 8.9 9.0   PROT 7.4  --   --    ALBUMIN 3.1*  --   --    BILITOT 0.3  --   --    ALKPHOS 72  --   --    AST 23  --   --    ALT 22  --   --    ANIONGAP 10 9 6*   ESTGFRAFRICA >60.0 >60.0 >60.0   EGFRNONAA 55.3* >60.0 >60.0   , CBC   Recent Labs   Lab 12/04/19  1734 12/05/19 0542 12/06/19 0518   WBC 7.14 6.11 5.54   HGB 10.8* 10.0* 10.3*   HCT 35.9* 32.6* 33.7*    234 238    and All pertinent lab results from the last 24 hours have been reviewed.    Significant Imaging: Echocardiogram:   2D echo with color flow doppler:   Results for orders placed or performed during the hospital encounter of 10/15/18   2D echo with color flow doppler   Result Value Ref Range    QEF 60 55 - 65    Diastolic Dysfunction No     Aortic Valve Regurgitation TRIVIAL     Est. PA Systolic Pressure 26.04     Mitral Valve Mobility NORMAL     Tricuspid Valve Regurgitation TRIVIAL     Narrative    Date of Procedure: 10/16/2018        TEST DESCRIPTION   Technical Quality: This is a technically challenging study. There is poor endocardial definition.     Aorta: The aortic root is normal in size, measuring 3.1 cm at sinotubular junction and 3.5 cm at Sinuses of Valsalva. The proximal ascending aorta is normal in size, measuring 3.4 cm across.     Left Atrium: The left atrial volume index is mildly enlarged, measuring 38.46 cc/m2.     Left Ventricle: The left ventricle is normal in size, with an end-diastolic diameter of 4.1 cm, and an end-systolic diameter of 2.7 cm. LV wall  thickness is normal, with the septum and the posterior wall each measuring 0.8 cm across. Relative wall   thickness was normal at 0.39, and the LV mass index was 46.5 g/m2 consistent with normal left ventricular mass. There are no regional wall motion abnormalities. Left ventricular systolic function appears normal. Visually estimated ejection fraction is   60-65%. The LV Doppler derived stroke volume equals 52.0 ccs.     Diastolic indices: E/e' ratio(avg) 4. Diastolic function is normal.     Right Atrium: The right atrium is normal in size, measuring 4.3 cm in length and 3.6 cm in width in the apical view.     Right Ventricle: The right ventricle is normal in size measuring 3.4 cm at the base in the apical right ventricle-focused view. Global right ventricular systolic function appears normal. Tricuspid annular plane systolic excursion (TAPSE) is 1.9 cm.   Tissue Doppler-derived tricuspid annular peak systolic velocity (S prime) is 9.9 cm/s. The estimated PA systolic pressure is 26 mmHg.     Aortic Valve:  Aortic valve is normal in structure with normal leaflet mobility. The aortic valve is normal in structure with normal leaflet mobility. The aortic valve is tri-leaflet in structure. Additionally, there is trivial aortic regurgitation.     Mitral Valve:  Mitral valve is normal in structure with normal leaflet mobility. The mitral valve is normal in structure with normal leaflet mobility.     Tricuspid Valve:  Tricuspid valve is normal in structure with normal leaflet mobility. The tricuspid valve is normal in structure with normal leaflet mobility. There is trivial tricuspid regurgitation.     Pulmonary Valve:  Pulmonary valve is normal in structure with normal leaflet mobility. The pulmonic valve is normal in structure with normal leaflet mobility. There is trivial pulmonic regurgitation.     IVC: IVC is normal in size and collapses > 50% with a sniff, suggesting normal right atrial pressure of 3 mmHg.      Intracavitary: There is no evidence of pericardial effusion, intracavity mass, thrombi, or vegetation.         CONCLUSIONS     1 - Normal left ventricular systolic function (EF 60-65%).     2 - Normal left ventricular diastolic function.     3 - Normal right ventricular systolic function .     4 - Trivial aortic regurgitation.     5 - Trivial tricuspid regurgitation.     6 - The estimated PA systolic pressure is 26 mmHg.     7 - Mild left atrial enlargement.             This document has been electronically    SIGNED BY: Mart Alcala MD On: 10/16/2018 11:00    and Transthoracic echo (TTE) complete (Cupid Only): No results found for this or any previous visit. and X-Ray: CXR: X-Ray Chest 1 View (CXR): No results found for this visit on 12/04/19.

## 2019-12-06 NOTE — CONSULTS
[facility]  Cardiology  Consult Note    Patient Name: Saji Castañeda  MRN: [id number]  Admission Date: 12/4/2019  Hospital Length of Stay: 2 days  Code Status: Full Code   Attending Provider: Juan Munson MD   Consulting Provider: Anabel Haines MD  Primary Care Physician: Lalo Heath MD  Principal Problem:Ischemic ulcer diabetic foot    Patient information was obtained from patient, past medical records and ER records.     Inpatient consult to Cardiology  Consult performed by: Anabel Haines MD  Consult ordered by: Juan Munson MD  Reason for consult: Asymptomatic bradycardia        Subjective:     Chief Complaint:  Bradycardia     HPI:   70 yo male with a past medical hx of DM type 2 and HTN s/p Left foot amputation here with dry gangrene of R great toe and has received a Vascular surgery consultation for evaluation of this recently. They noted that he has multiphasic peripheral pulses on this foot and while has no evidence of large vessel disease could have an element of microvascular dysfunction that is contributing to his discoloration of his toe. They recommended medical therapy. The patient has no evidence of infection (afebrile and no WCC). Blood cultures are negative.     Cardiology has been consulted for 2:1 AV block in a patient with a hx of known Mobitz type I block. Had this intermittently on a ambulatory visit in 01/07/13 he had this rhythm and was noted to be asymptomatic. When it occurred today the patient was       Past Medical History:   Diagnosis Date    Arthritis     legs    Diabetes mellitus     Diabetes mellitus, type 2     Hyperlipidemia     Osteomyelitis        Past Surgical History:   Procedure Laterality Date    FOOT AMPUTATION  October 2010    left high midfoot amputation       Review of patient's allergies indicates:  No Known Allergies    No current facility-administered medications on file prior to encounter.      Current Outpatient Medications  "on File Prior to Encounter   Medication Sig    ammonium lactate (LAC-HYDRIN) 12 % lotion     aspirin (ECOTRIN) 81 MG EC tablet Take 81 mg by mouth once daily.    atorvastatin (LIPITOR) 20 MG tablet TK 1 T PO QD    atorvastatin (LIPITOR) 40 MG tablet Take 40 mg by mouth every evening.     BD ULTRA-FINE ADITYA PEN NEEDLE 32 gauge x 5/32" Ndle USE FOUR TIMES DAILY WITH MEALS AND NIGHTLY    BESIVANCE 0.6 % DrpS INSTILL 1 DROP INTO THE OS TID    blood sugar diagnostic (CONTOUR TEST STRIPS) Strp Inject 1 strip into the skin 2 (two) times daily before meals.    DUREZOL 0.05 % Drop ophthalmic solution INSTILL 1 DROP INTO THE OS QID    econazole nitrate 1 % cream APPLY EXTERNALLY TO THE AFFECTED AREA ONCE DAILY    ILEVRO 0.3 % DrpS INSTILL 1 DROP INTO THE OS QD    JANUVIA 100 mg Tab TAKE 1 TABLET BY MOUTH ONCE DAILY    LEVEMIR FLEXTOUCH U-100 INSULN 100 unit/mL (3 mL) InPn pen INJECT 25 UNITS UNDER THE SKIN TWICE DAILY    losartan (COZAAR) 50 MG tablet     metformin (GLUCOPHAGE) 500 MG tablet Take 2 tablets (1,000 mg total) by mouth 2 (two) times daily with meals.    MICROLET LANCET Misc     sitagliptin (JANUVIA) 100 MG Tab Take 1 tablet (100 mg total) by mouth once daily.    tamsulosin (FLOMAX) 0.4 mg Cp24 Take 0.4 mg by mouth once daily.    triamcinolone acetonide 0.1% (KENALOG) 0.1 % cream APPLY EXTERNALLY TO THE AFFECTED AREA TWICE DAILY AS DIRECTED    TRUE METRIX GLUCOSE TEST STRIP Strp USE TO TEST TWICE DAILY     Family History     Problem Relation (Age of Onset)    Cancer Brother    Diabetes Mother, Sister    Heart disease Mother    Stroke Sister        Tobacco Use    Smoking status: Never Smoker    Smokeless tobacco: Never Used   Substance and Sexual Activity    Alcohol use: No     Comment: occassional    Drug use: No    Sexual activity: Not Currently     Review of Systems   All other systems reviewed and are negative.    Objective:     Vital Signs (Most Recent):  Temp: 98.3 °F (36.8 °C) " (12/06/19 1141)  Pulse: 63 (12/06/19 1141)  Resp: 18 (12/06/19 1141)  BP: (!) 170/77 (12/06/19 1141)  SpO2: 100 % (12/06/19 1141) Vital Signs (24h Range):  Temp:  [97 °F (36.1 °C)-98.7 °F (37.1 °C)] 98.3 °F (36.8 °C)  Pulse:  [36-76] 63  Resp:  [18] 18  SpO2:  [98 %-100 %] 100 %  BP: (136-195)/(60-86) 170/77     Weight: 117.9 kg (260 lb)  Body mass index is 38.4 kg/m².    SpO2: 100 %  O2 Device (Oxygen Therapy): room air      Intake/Output Summary (Last 24 hours) at 12/6/2019 1148  Last data filed at 12/5/2019 1731  Gross per 24 hour   Intake 240 ml   Output 300 ml   Net -60 ml       Lines/Drains/Airways     Peripherally Inserted Central Catheter Line                 PICC Double Lumen 06/19/14 1639 right brachial 1995 days          Drain                 Closed/Suction Drain Left Foot Other (Comment) 2115 days          Airway                 Airway - Non-Surgical 06/13/17 1422 905 days          Peripheral Intravenous Line                 Peripheral IV - Single Lumen 06/13/17 1342 Left Hand 905 days                Physical Exam   Constitutional: He is oriented to person, place, and time. He appears well-developed and well-nourished.   HENT:   Head: Normocephalic and atraumatic.   Eyes: Pupils are equal, round, and reactive to light. EOM are normal.   Neck: Normal range of motion. Neck supple. No JVD present.   Cardiovascular: Normal rate, regular rhythm and intact distal pulses.   No murmur heard.  Pulmonary/Chest: Effort normal and breath sounds normal.   Abdominal: Soft. Bowel sounds are normal.   Musculoskeletal: Normal range of motion. He exhibits no edema.   Neurological: He is alert and oriented to person, place, and time.   Skin: Skin is warm.     Palpable pulses on DP and PT on right foot. Warm foot noted    Significant Labs:   CMP   Recent Labs   Lab 12/04/19  1734 12/05/19  0542 12/06/19  0518    142 140   K 4.7 3.9 4.3    105 105   CO2 26 28 29   * 101 134*   BUN 19 18 16   CREATININE 1.3  1.2 1.1   CALCIUM 9.2 8.9 9.0   PROT 7.4  --   --    ALBUMIN 3.1*  --   --    BILITOT 0.3  --   --    ALKPHOS 72  --   --    AST 23  --   --    ALT 22  --   --    ANIONGAP 10 9 6*   ESTGFRAFRICA >60.0 >60.0 >60.0   EGFRNONAA 55.3* >60.0 >60.0   , CBC   Recent Labs   Lab 12/04/19  1734 12/05/19  0542 12/06/19  0518   WBC 7.14 6.11 5.54   HGB 10.8* 10.0* 10.3*   HCT 35.9* 32.6* 33.7*    234 238    and All pertinent lab results from the last 24 hours have been reviewed.    Significant Imaging: Echocardiogram:   2D echo with color flow doppler:   Results for orders placed or performed during the hospital encounter of 10/15/18   2D echo with color flow doppler   Result Value Ref Range    QEF 60 55 - 65    Diastolic Dysfunction No     Aortic Valve Regurgitation TRIVIAL     Est. PA Systolic Pressure 26.04     Mitral Valve Mobility NORMAL     Tricuspid Valve Regurgitation TRIVIAL     Narrative    Date of Procedure: 10/16/2018        TEST DESCRIPTION   Technical Quality: This is a technically challenging study. There is poor endocardial definition.     Aorta: The aortic root is normal in size, measuring 3.1 cm at sinotubular junction and 3.5 cm at Sinuses of Valsalva. The proximal ascending aorta is normal in size, measuring 3.4 cm across.     Left Atrium: The left atrial volume index is mildly enlarged, measuring 38.46 cc/m2.     Left Ventricle: The left ventricle is normal in size, with an end-diastolic diameter of 4.1 cm, and an end-systolic diameter of 2.7 cm. LV wall thickness is normal, with the septum and the posterior wall each measuring 0.8 cm across. Relative wall   thickness was normal at 0.39, and the LV mass index was 46.5 g/m2 consistent with normal left ventricular mass. There are no regional wall motion abnormalities. Left ventricular systolic function appears normal. Visually estimated ejection fraction is   60-65%. The LV Doppler derived stroke volume equals 52.0 ccs.     Diastolic indices: E/e'  ratio(avg) 4. Diastolic function is normal.     Right Atrium: The right atrium is normal in size, measuring 4.3 cm in length and 3.6 cm in width in the apical view.     Right Ventricle: The right ventricle is normal in size measuring 3.4 cm at the base in the apical right ventricle-focused view. Global right ventricular systolic function appears normal. Tricuspid annular plane systolic excursion (TAPSE) is 1.9 cm.   Tissue Doppler-derived tricuspid annular peak systolic velocity (S prime) is 9.9 cm/s. The estimated PA systolic pressure is 26 mmHg.     Aortic Valve:  Aortic valve is normal in structure with normal leaflet mobility. The aortic valve is normal in structure with normal leaflet mobility. The aortic valve is tri-leaflet in structure. Additionally, there is trivial aortic regurgitation.     Mitral Valve:  Mitral valve is normal in structure with normal leaflet mobility. The mitral valve is normal in structure with normal leaflet mobility.     Tricuspid Valve:  Tricuspid valve is normal in structure with normal leaflet mobility. The tricuspid valve is normal in structure with normal leaflet mobility. There is trivial tricuspid regurgitation.     Pulmonary Valve:  Pulmonary valve is normal in structure with normal leaflet mobility. The pulmonic valve is normal in structure with normal leaflet mobility. There is trivial pulmonic regurgitation.     IVC: IVC is normal in size and collapses > 50% with a sniff, suggesting normal right atrial pressure of 3 mmHg.     Intracavitary: There is no evidence of pericardial effusion, intracavity mass, thrombi, or vegetation.         CONCLUSIONS     1 - Normal left ventricular systolic function (EF 60-65%).     2 - Normal left ventricular diastolic function.     3 - Normal right ventricular systolic function .     4 - Trivial aortic regurgitation.     5 - Trivial tricuspid regurgitation.     6 - The estimated PA systolic pressure is 26 mmHg.     7 - Mild left atrial  enlargement.             This document has been electronically    SIGNED BY: Mart Alcala MD On: 10/16/2018 11:00    and Transthoracic echo (TTE) complete (Cupid Only): No results found for this or any previous visit. and X-Ray: CXR: X-Ray Chest 1 View (CXR): No results found for this visit on 12/04/19.    Assessment and Plan:     Mobitz (type) I (Wenckebach's) atrioventricular block  68 yo male with a past medical hx of DM type 2 and HTN s/p Left foot amputation here with 2:1 AV conduction that has subsequently resolved on repeat EKG. Asymptomatic at the time of 2:1 AV conduction. Had prior Mobitz type 1 conduction noted in the ambulatory setting when he was asymptomatic. Mobiltz type 1 actually noted today. Therefore patient has asymptomatic Mobitz type 1.      Plan:  - No need for PPM at this time. Safe to discharge.  - If patient develops symptoms such as pre-syncope or syncope would consider holter monitor at that time.    Discussed with Dr. Vargas,        VTE Risk Mitigation (From admission, onward)         Ordered     enoxaparin injection 40 mg  Daily      12/04/19 2250     IP VTE HIGH RISK PATIENT  Once      12/04/19 2250                Thank you for your consult. I will sign off. Please contact us if you have any additional questions.    Anabel Haines MD  Cardiology   [facility]

## 2019-12-06 NOTE — NURSING
"DR Munson notified patient of patient B/ P 169/74 and apical heart 39 . Machine was 41 heart rate . Dr Munson stated, " He will look into it . "  "

## 2019-12-06 NOTE — PLAN OF CARE
Quiet hours. No complaints. Up in chair most of night. Heart rate down in the 40's when asleep, when awake up in the 50-60's. Bp elevated, hydralazine given once and came down. No iv access, none needed at present, md aware because pt is hard stick. Safety maintained.

## 2019-12-06 NOTE — NURSING
Pt discharged to home via wheelchair. Pt  copy of discharged papers instructions. Pt denies pain at this time. Pt educated on signs and symptoms of when to call the doctor. All belongings with the patient . Pt verbalized understanding.

## 2019-12-06 NOTE — ASSESSMENT & PLAN NOTE
70 yo male with a past medical hx of DM type 2 and HTN s/p Left foot amputation here with 2:1 AV conduction that has subsequently resolved on repeat EKG. Asymptomatic at the time of 2:1 AV conduction. Had prior Mobitz type 1 conduction noted in the ambulatory setting when he was asymptomatic. Mobiltz type 1 actually noted today. Therefore patient has asymptomatic Mobitz type 1.      Plan:  - No need for PPM at this time. Safe to discharge.  - If patient develops symptoms such as pre-syncope or syncope would consider holter monitor at that time.    Discussed with Dr. Vargas,

## 2019-12-07 NOTE — DISCHARGE SUMMARY
"Discharge Summary  Hospital Medicine    Attending Provider on Discharge: Juan Munson MD  Hospital Medicine Team: [facility]  Date of Admission:  12/4/2019     Date of Discharge:  12/6/2019  Length of Stay:  LOS: 2 days   Code status: Full Code        Active Hospital Problems    Diagnosis  POA    *Ischemic ulcer diabetic foot [E11.621, L97.509]  Yes    Mobitz (type) I (Wenckebach's) atrioventricular block [I44.1]  No    Chronic kidney disease, stage III (moderate) [N18.3]  Yes    Anemia of chronic disease [D63.8]  Yes    Type 2 diabetes, uncontrolled, with neuropathy [E11.40, E11.65]  Yes      Resolved Hospital Problems    Diagnosis Date Resolved POA    Essential hypertension [I10] 12/06/2019 Yes        HPI Mr. Castañeda is a very nice 68yo man with a past medical history of poorly controlled DM2 and HTN.  This led to left foot amputation in the past.  He follows closely with Podiatry as well.  He states that about 2 weeks ago he developed a blister to the right great toe medial aspect.  He put some salve on it and it gradually, "dried up and scabbed over.  I thought it was healing."  He tells me that he followed up with his Podiatrist today, who sent him to the ED.  He denies any pain, redness or drainage to the area.  He denies any fever or chills.  I cannot find any Podiatry notes in our Epic recently.  It does appear a home health nurse from [facility] was trying to get him a clinic appointment with Vascular surgery STAT (multiple telephone encounters for this over the past two days).      Hospital Course    Ischemic ulcer diabetic foot, right great toe  Seen by podiatry and vascular, no further w/u is needed. No macrovascular disease found.  -No georgette redness or drainage to suggest acute infection              -aspirin EC tablet 81 mg, 81 mg, Oral, Daily  -atorvastatin tablet 40 mg, 40 mg, Oral, QHS     Diabetes mellitus 2, uncontrolled, neuropathy  -insulin detemir U-100 pen 20 Units, 20 Units, " Subcutaneous, BID              -On 25U BID at home  -Aspart 5U TID with meals  -Check HgA1c  -Low dose SSI protocol     Essential hypertension  -losartan tablet 50 mg, 50 mg, Oral, Daily  -hydrALAZINE tablet 25 mg, 25 mg, Oral, Q8H PRN     Chronic kidney disease, stage III  -Stable at baseline to monitor  -Avoid IV dye     Anemia of chronic disease  -Check B12, folate, Fe studies  -At baseline     BPH without obstruction  -tamsulosin 24 hr capsule 0.4 mg, 0.4 mg, Oral, Daily       Recent Labs   Lab 12/04/19  1734 12/05/19 0542 12/06/19 0518   WBC 7.14 6.11 5.54   HGB 10.8* 10.0* 10.3*   HCT 35.9* 32.6* 33.7*    234 238     Recent Labs   Lab 12/04/19  1734 12/05/19 0542 12/06/19 0518    142 140   K 4.7 3.9 4.3    105 105   CO2 26 28 29   BUN 19 18 16   CREATININE 1.3 1.2 1.1   * 101 134*   CALCIUM 9.2 8.9 9.0   MG  --  1.7 1.9   PHOS  --  3.2 3.2     Recent Labs   Lab 12/04/19  1734   ALKPHOS 72   ALT 22   AST 23   ALBUMIN 3.1*   PROT 7.4   BILITOT 0.3      No results for input(s): CPK, CPKMB, MB, TROPONINI in the last 72 hours.  No results for input(s): LACTATE in the last 72 hours.    Recent Labs   Lab 12/05/19  0813 12/05/19  1142 12/05/19  1611 12/05/19  2139 12/06/19  0741 12/06/19  1200   POCTGLUCOSE 116* 123* 114* 180* 105 161*      Hemoglobin A1C   Date Value Ref Range Status   12/05/2019 9.8 (H) 4.0 - 5.6 % Final     Comment:     ADA Screening Guidelines:  5.7-6.4%  Consistent with prediabetes  >or=6.5%  Consistent with diabetes  High levels of fetal hemoglobin interfere with the HbA1C  assay. Heterozygous hemoglobin variants (HbS, HgC, etc)do  not significantly interfere with this assay.   However, presence of multiple variants may affect accuracy.     10/15/2018 7.7 (H) 4.0 - 5.6 % Final     Comment:     ADA Screening Guidelines:  5.7-6.4%  Consistent with prediabetes  >or=6.5%  Consistent with diabetes  High levels of fetal hemoglobin interfere with the HbA1C  assay.  "Heterozygous hemoglobin variants (HbS, HgC, etc)do  not significantly interfere with this assay.   However, presence of multiple variants may affect accuracy.     10/25/2016 6.9 (H) 4.5 - 6.2 % Final     Comment:     According to ADA guidelines, hemoglobin A1C <7.0% represents  optimal control in non-pregnant diabetic patients.  Different  metrics may apply to specific populations.   Standards of Medical Care in Diabetes - 2016.  For the purpose of screening for the presence of diabetes:  <5.7%     Consistent with the absence of diabetes  5.7-6.4%  Consistent with increasing risk for diabetes   (prediabetes)  >or=6.5%  Consistent with diabetes  Currently no consensus exists for use of hemoglobin A1C  for diagnosis of diabetes for children.         Procedures: none    Consultants: Podiatry, vascular    Discharge Medication List as of 12/6/2019  3:01 PM      CONTINUE these medications which have NOT CHANGED    Details   ammonium lactate (LAC-HYDRIN) 12 % lotion Starting 2/5/2015, Until Discontinued, Historical Med      aspirin (ECOTRIN) 81 MG EC tablet Take 81 mg by mouth once daily., Until Discontinued, Historical Med      !! atorvastatin (LIPITOR) 20 MG tablet TK 1 T PO QD, Historical Med      !! atorvastatin (LIPITOR) 40 MG tablet Take 40 mg by mouth every evening. , Starting 5/1/2014, Until Discontinued, Historical Med      BD ULTRA-FINE ADITYA PEN NEEDLE 32 gauge x 5/32" Ndle USE FOUR TIMES DAILY WITH MEALS AND NIGHTLY, Normal      BESIVANCE 0.6 % DrpS INSTILL 1 DROP INTO THE OS TID, Historical Med      !! blood sugar diagnostic (CONTOUR TEST STRIPS) Strp Inject 1 strip into the skin 2 (two) times daily before meals., Starting 5/27/2016, Until Discontinued, Normal      DUREZOL 0.05 % Drop ophthalmic solution INSTILL 1 DROP INTO THE OS QID, Historical Med      econazole nitrate 1 % cream APPLY EXTERNALLY TO THE AFFECTED AREA ONCE DAILY, Normal      ILEVRO 0.3 % DrpS INSTILL 1 DROP INTO THE OS QD, Historical Med    "   !! JANUVIA 100 mg Tab TAKE 1 TABLET BY MOUTH ONCE DAILY, Normal      LEVEMIR FLEXTOUCH U-100 INSULN 100 unit/mL (3 mL) InPn pen INJECT 25 UNITS UNDER THE SKIN TWICE DAILY, Normal      losartan (COZAAR) 50 MG tablet Starting 7/31/2014, Until Discontinued, Historical Med      metformin (GLUCOPHAGE) 500 MG tablet Take 2 tablets (1,000 mg total) by mouth 2 (two) times daily with meals., Starting 7/13/2016, Until Discontinued, Normal      MICROLET LANCET Misc Starting 4/18/2014, Until Discontinued, Historical Med      !! sitagliptin (JANUVIA) 100 MG Tab Take 1 tablet (100 mg total) by mouth once daily., Starting 7/13/2016, Until Discontinued, Normal      tamsulosin (FLOMAX) 0.4 mg Cp24 Take 0.4 mg by mouth once daily., Until Discontinued, Historical Med      triamcinolone acetonide 0.1% (KENALOG) 0.1 % cream APPLY EXTERNALLY TO THE AFFECTED AREA TWICE DAILY AS DIRECTED, Normal      !! TRUE METRIX GLUCOSE TEST STRIP Strp USE TO TEST TWICE DAILY, Normal       !! - Potential duplicate medications found. Please discuss with provider.          Discharge Diet:diabetic diet: 2000 calorie     Activity: activity as tolerated    Discharge Condition: Good    Disposition: Home or Self Care    Tests pending at the time of discharge: none      Time spent  on the discharge of the patient including review of hospital course with the patient. reviewing discharge medications and arranging follow-up care 35 minutes    Discharge examination Patient was seen and examined on the date of discharge and determined to be suitable for discharge.    Discharge plan     Future Appointments   Date Time Provider Department Center   12/23/2019  8:20 AM Cesar Wu MD [facility] CARDIO Goldy Munson MD

## 2019-12-07 NOTE — NURSING
"Patient instructed to follow up with Cardiologist appt. Patient stated,"ok" " Nurse removed cardiac monitor ( tele box) removed . Brother present patient left his wheelchair with brother at his side .  "

## 2019-12-09 NOTE — PLAN OF CARE
Future Appointments   Date Time Provider Department Center   12/23/2019  8:20 AM Cesar Wu MD [facility] CARDIO [facility]        12/09/19 0850   Final Note   Assessment Type Final Discharge Note   Anticipated Discharge Disposition Home   What phone number can be called within the next 1-3 days to see how you are doing after discharge? [phone]   Hospital Follow Up  Appt(s) scheduled? Yes   Discharge plans and expectations educations in teach back method with documentation complete? Yes   Right Care Referral Info   Post Acute Recommendation No Care

## 2019-12-10 NOTE — PHYSICIAN QUERY
PT Name: Saji Castañeda  MR #: [id number]     Physician Query Form - Diabetic Condition Clarification   Racquel Hernandez RN, CCDS  Desk # [phone]; sandip # [phone] [email or web address]    This form is a permanent document in the medical record.     Query Date: December 10, 2019    By submitting this query, we are merely seeking further clarification of documentation to reflect the severity of illness of your patient. Please utilize your independent clinical judgment when addressing the question(s) below.    The Medical record reflects the following:     Indicators   Supporting Clinical Findings Location in Medical Record   x Diabetes uncontrolled documented Diabetes mellitus 2, uncontrolled, neuropathy   H&P     x Lab Value(s), POCT glucose value(s) A1C 9.8 on 12/5  eAVG Glucose 235 on 12/5    Glucose: 132 on 12/4;  101 on 12/5;  134 on 12/6 12/5/2019 11:42 12/5/2019 16:11 12/5/2019 21:39 12/6/2019 07:41 12/6/2019 12:00   POCT Glucose 123 (H) 114 (H) 180 (H) 105 161 (H)    Lab               x Treatment                                 Medication -insulin detemir U-100 pen 20 Units, 20 Units, Subcutaneous, BID  -On 25U BID at home  -Aspart 5U TID with meals  -Check HgA1c  -Low dose SSI protocol H&P   x Other pmhx: poorly controlled DMT2 & HTN  ckd 3; AoCD; BPH w/o obstruction  Left BKA with hypertrophic, cornified skin.?  Medial great toe ulcer with hypertrophic dried dead tissue around it.     Home Meds per H&P:   JANUVIA 100 mg Tab TAKE 1 TABLET BY MOUTH ONCE DAILY    LEVEMIR FLEXTOUCH  INJECT 25 UNITS UNDER THE SKIN TWICE DAILY    metformin 500 MG tablet Take 2 tablets (1,000 mg total) by mouth 2 (two) times daily with meals.    (JANUVIA) 100 MG Tab Take 1 tablet (100 mg total) by mouth once daily.    H&P     Provider, please specify the meaning of the term uncontrolled:    [ x  ] Diabetes mellitus Type 2 with hyperglycemia   [   ] Other diabetes complication (please specify): ____________   [   ]   Clinically Undetermined     Please document in your progress notes daily for the duration of treatment until resolved, and include in your discharge summary.

## 2020-02-21 ENCOUNTER — TELEPHONE (OUTPATIENT)
Dept: CARDIOLOGY | Facility: CLINIC | Age: 70
End: 2020-02-21

## 2020-02-21 DIAGNOSIS — I44.1 MOBITZ (TYPE) I (WENCKEBACH'S) ATRIOVENTRICULAR BLOCK: Primary | ICD-10-CM

## 2020-02-21 DIAGNOSIS — E11.9 DIABETES MELLITUS WITHOUT COMPLICATION: ICD-10-CM

## 2020-12-08 ENCOUNTER — HOSPITAL ENCOUNTER (INPATIENT)
Facility: HOSPITAL | Age: 70
LOS: 6 days | Discharge: HOME HEALTH | DRG: 629 | End: 2020-12-14
Attending: EMERGENCY MEDICINE | Admitting: EMERGENCY MEDICINE
Payer: MEDICARE

## 2020-12-08 DIAGNOSIS — I49.9 ABNORMAL HEART RHYTHM: ICD-10-CM

## 2020-12-08 DIAGNOSIS — M86.162 OTHER ACUTE OSTEOMYELITIS OF LEFT TIBIA: ICD-10-CM

## 2020-12-08 DIAGNOSIS — M86.9 OSTEOMYELITIS: ICD-10-CM

## 2020-12-08 DIAGNOSIS — S91.302A OPEN WOUND OF LEFT FOOT WITH COMPLICATION, INITIAL ENCOUNTER: ICD-10-CM

## 2020-12-08 DIAGNOSIS — M86.9 OSTEOMYELITIS OF LEFT FOOT, UNSPECIFIED TYPE: ICD-10-CM

## 2020-12-08 DIAGNOSIS — M86.9 OSTEOMYELITIS, UNSPECIFIED SITE, UNSPECIFIED TYPE: Primary | ICD-10-CM

## 2020-12-08 DIAGNOSIS — R60.0 EDEMA OF LEG: Chronic | ICD-10-CM

## 2020-12-08 DIAGNOSIS — S91.302D OPEN WOUND OF LEFT FOOT WITH COMPLICATION, SUBSEQUENT ENCOUNTER: ICD-10-CM

## 2020-12-08 DIAGNOSIS — M86.472 CHRONIC OSTEOMYELITIS OF LEFT ANKLE WITH DRAINING SINUS: ICD-10-CM

## 2020-12-08 DIAGNOSIS — I73.9 PERIPHERAL ARTERIAL DISEASE: Chronic | ICD-10-CM

## 2020-12-08 DIAGNOSIS — E11.42 DIABETIC POLYNEUROPATHY ASSOCIATED WITH TYPE 2 DIABETES MELLITUS: ICD-10-CM

## 2020-12-08 DIAGNOSIS — M79.662 PAIN OF LEFT LOWER LEG: ICD-10-CM

## 2020-12-08 DIAGNOSIS — S81.802A LEG WOUND, LEFT: ICD-10-CM

## 2020-12-08 DIAGNOSIS — I48.92 ATRIAL FLUTTER: ICD-10-CM

## 2020-12-08 DIAGNOSIS — L97.509 FOOT ULCER: ICD-10-CM

## 2020-12-08 DIAGNOSIS — S81.802D WOUND OF LEFT LOWER EXTREMITY, SUBSEQUENT ENCOUNTER: ICD-10-CM

## 2020-12-08 LAB
ALBUMIN SERPL BCP-MCNC: 2.9 G/DL (ref 3.5–5.2)
ALP SERPL-CCNC: 76 U/L (ref 55–135)
ALT SERPL W/O P-5'-P-CCNC: 16 U/L (ref 10–44)
ANION GAP SERPL CALC-SCNC: 9 MMOL/L (ref 8–16)
AST SERPL-CCNC: 18 U/L (ref 10–40)
BACTERIA #/AREA URNS AUTO: ABNORMAL /HPF
BASOPHILS # BLD AUTO: 0.02 K/UL (ref 0–0.2)
BASOPHILS NFR BLD: 0.3 % (ref 0–1.9)
BILIRUB SERPL-MCNC: 0.3 MG/DL (ref 0.1–1)
BILIRUB UR QL STRIP: NEGATIVE
BUN SERPL-MCNC: 17 MG/DL (ref 8–23)
CALCIUM SERPL-MCNC: 9.1 MG/DL (ref 8.7–10.5)
CHLORIDE SERPL-SCNC: 99 MMOL/L (ref 95–110)
CLARITY UR REFRACT.AUTO: CLEAR
CO2 SERPL-SCNC: 31 MMOL/L (ref 23–29)
COLOR UR AUTO: YELLOW
CREAT SERPL-MCNC: 1.3 MG/DL (ref 0.5–1.4)
CRP SERPL-MCNC: 31.4 MG/L (ref 0–8.2)
CTP QC/QA: YES
DIFFERENTIAL METHOD: ABNORMAL
EOSINOPHIL # BLD AUTO: 0.3 K/UL (ref 0–0.5)
EOSINOPHIL NFR BLD: 4.7 % (ref 0–8)
ERYTHROCYTE [DISTWIDTH] IN BLOOD BY AUTOMATED COUNT: 16.8 % (ref 11.5–14.5)
ERYTHROCYTE [SEDIMENTATION RATE] IN BLOOD BY WESTERGREN METHOD: 80 MM/HR (ref 0–23)
EST. GFR  (AFRICAN AMERICAN): >60 ML/MIN/1.73 M^2
EST. GFR  (NON AFRICAN AMERICAN): 54.9 ML/MIN/1.73 M^2
ESTIMATED AVG GLUCOSE: 180 MG/DL (ref 68–131)
GLUCOSE SERPL-MCNC: 163 MG/DL (ref 70–110)
GLUCOSE SERPL-MCNC: 185 MG/DL (ref 70–110)
GLUCOSE UR QL STRIP: NEGATIVE
HBA1C MFR BLD HPLC: 7.9 % (ref 4–5.6)
HCT VFR BLD AUTO: 36.3 % (ref 40–54)
HGB BLD-MCNC: 10.9 G/DL (ref 14–18)
HGB UR QL STRIP: ABNORMAL
HYALINE CASTS UR QL AUTO: 0 /LPF
IMM GRANULOCYTES # BLD AUTO: 0.02 K/UL (ref 0–0.04)
IMM GRANULOCYTES NFR BLD AUTO: 0.3 % (ref 0–0.5)
KETONES UR QL STRIP: NEGATIVE
LACTATE SERPL-SCNC: 1.7 MMOL/L (ref 0.5–2.2)
LACTATE SERPL-SCNC: 2.5 MMOL/L (ref 0.5–2.2)
LEUKOCYTE ESTERASE UR QL STRIP: ABNORMAL
LYMPHOCYTES # BLD AUTO: 1.4 K/UL (ref 1–4.8)
LYMPHOCYTES NFR BLD: 22.5 % (ref 18–48)
MCH RBC QN AUTO: 25.5 PG (ref 27–31)
MCHC RBC AUTO-ENTMCNC: 30 G/DL (ref 32–36)
MCV RBC AUTO: 85 FL (ref 82–98)
MICROSCOPIC COMMENT: ABNORMAL
MONOCYTES # BLD AUTO: 0.6 K/UL (ref 0.3–1)
MONOCYTES NFR BLD: 9.6 % (ref 4–15)
NEUTROPHILS # BLD AUTO: 4 K/UL (ref 1.8–7.7)
NEUTROPHILS NFR BLD: 62.6 % (ref 38–73)
NITRITE UR QL STRIP: NEGATIVE
NRBC BLD-RTO: 0 /100 WBC
PH UR STRIP: 6 [PH] (ref 5–8)
PLATELET # BLD AUTO: 325 K/UL (ref 150–350)
PMV BLD AUTO: 10.2 FL (ref 9.2–12.9)
POCT GLUCOSE: 185 MG/DL (ref 70–110)
POCT GLUCOSE: 49 MG/DL (ref 70–110)
POCT GLUCOSE: 71 MG/DL (ref 70–110)
POTASSIUM SERPL-SCNC: 4 MMOL/L (ref 3.5–5.1)
PROCALCITONIN SERPL IA-MCNC: 0.03 NG/ML
PROT SERPL-MCNC: 8.3 G/DL (ref 6–8.4)
PROT UR QL STRIP: ABNORMAL
RBC # BLD AUTO: 4.28 M/UL (ref 4.6–6.2)
RBC #/AREA URNS AUTO: 5 /HPF (ref 0–4)
SARS-COV-2 RDRP RESP QL NAA+PROBE: NEGATIVE
SODIUM SERPL-SCNC: 139 MMOL/L (ref 136–145)
SP GR UR STRIP: 1.01 (ref 1–1.03)
SQUAMOUS #/AREA URNS AUTO: 1 /HPF
URN SPEC COLLECT METH UR: ABNORMAL
WBC # BLD AUTO: 6.36 K/UL (ref 3.9–12.7)
WBC #/AREA URNS AUTO: 36 /HPF (ref 0–5)
YEAST UR QL AUTO: ABNORMAL

## 2020-12-08 PROCEDURE — 63600175 PHARM REV CODE 636 W HCPCS: Performed by: EMERGENCY MEDICINE

## 2020-12-08 PROCEDURE — 81001 URINALYSIS AUTO W/SCOPE: CPT

## 2020-12-08 PROCEDURE — 94761 N-INVAS EAR/PLS OXIMETRY MLT: CPT

## 2020-12-08 PROCEDURE — 85652 RBC SED RATE AUTOMATED: CPT

## 2020-12-08 PROCEDURE — 25000003 PHARM REV CODE 250: Performed by: EMERGENCY MEDICINE

## 2020-12-08 PROCEDURE — 99285 EMERGENCY DEPT VISIT HI MDM: CPT | Mod: 25

## 2020-12-08 PROCEDURE — 87086 URINE CULTURE/COLONY COUNT: CPT

## 2020-12-08 PROCEDURE — 80053 COMPREHEN METABOLIC PANEL: CPT

## 2020-12-08 PROCEDURE — U0002 COVID-19 LAB TEST NON-CDC: HCPCS | Performed by: EMERGENCY MEDICINE

## 2020-12-08 PROCEDURE — 63600175 PHARM REV CODE 636 W HCPCS: Performed by: HOSPITALIST

## 2020-12-08 PROCEDURE — 85025 COMPLETE CBC W/AUTO DIFF WBC: CPT

## 2020-12-08 PROCEDURE — 99233 PR SUBSEQUENT HOSPITAL CARE,LEVL III: ICD-10-PCS | Mod: ,,, | Performed by: PODIATRIST

## 2020-12-08 PROCEDURE — C9399 UNCLASSIFIED DRUGS OR BIOLOG: HCPCS | Performed by: HOSPITALIST

## 2020-12-08 PROCEDURE — 87040 BLOOD CULTURE FOR BACTERIA: CPT

## 2020-12-08 PROCEDURE — 83605 ASSAY OF LACTIC ACID: CPT

## 2020-12-08 PROCEDURE — 12000002 HC ACUTE/MED SURGE SEMI-PRIVATE ROOM

## 2020-12-08 PROCEDURE — 99285 PR EMERGENCY DEPT VISIT,LEVEL V: ICD-10-PCS | Mod: ,,, | Performed by: EMERGENCY MEDICINE

## 2020-12-08 PROCEDURE — 25000003 PHARM REV CODE 250: Performed by: HOSPITALIST

## 2020-12-08 PROCEDURE — 99233 SBSQ HOSP IP/OBS HIGH 50: CPT | Mod: ,,, | Performed by: PODIATRIST

## 2020-12-08 PROCEDURE — 99285 EMERGENCY DEPT VISIT HI MDM: CPT | Mod: ,,, | Performed by: EMERGENCY MEDICINE

## 2020-12-08 PROCEDURE — 86140 C-REACTIVE PROTEIN: CPT

## 2020-12-08 PROCEDURE — 84145 PROCALCITONIN (PCT): CPT

## 2020-12-08 PROCEDURE — 83036 HEMOGLOBIN GLYCOSYLATED A1C: CPT

## 2020-12-08 RX ORDER — AMOXICILLIN 250 MG
1 CAPSULE ORAL DAILY
Status: DISCONTINUED | OUTPATIENT
Start: 2020-12-09 | End: 2020-12-14 | Stop reason: HOSPADM

## 2020-12-08 RX ORDER — ZINC SULFATE 50(220)MG
220 CAPSULE ORAL DAILY
Status: DISCONTINUED | OUTPATIENT
Start: 2020-12-09 | End: 2020-12-14 | Stop reason: HOSPADM

## 2020-12-08 RX ORDER — INSULIN ASPART 100 [IU]/ML
8 INJECTION, SOLUTION INTRAVENOUS; SUBCUTANEOUS
Status: DISCONTINUED | OUTPATIENT
Start: 2020-12-08 | End: 2020-12-14

## 2020-12-08 RX ORDER — TALC
6 POWDER (GRAM) TOPICAL NIGHTLY PRN
Status: DISCONTINUED | OUTPATIENT
Start: 2020-12-08 | End: 2020-12-14 | Stop reason: HOSPADM

## 2020-12-08 RX ORDER — ONDANSETRON 8 MG/1
8 TABLET, ORALLY DISINTEGRATING ORAL EVERY 8 HOURS PRN
Status: DISCONTINUED | OUTPATIENT
Start: 2020-12-08 | End: 2020-12-14 | Stop reason: HOSPADM

## 2020-12-08 RX ORDER — NAPROXEN SODIUM 220 MG/1
81 TABLET, FILM COATED ORAL DAILY
Status: DISCONTINUED | OUTPATIENT
Start: 2020-12-09 | End: 2020-12-14 | Stop reason: HOSPADM

## 2020-12-08 RX ORDER — ACETAMINOPHEN 325 MG/1
650 TABLET ORAL EVERY 6 HOURS PRN
Status: DISCONTINUED | OUTPATIENT
Start: 2020-12-08 | End: 2020-12-14 | Stop reason: HOSPADM

## 2020-12-08 RX ORDER — TALC
6 POWDER (GRAM) TOPICAL NIGHTLY PRN
Status: CANCELLED | OUTPATIENT
Start: 2020-12-08

## 2020-12-08 RX ORDER — IBUPROFEN 200 MG
16 TABLET ORAL
Status: DISCONTINUED | OUTPATIENT
Start: 2020-12-08 | End: 2020-12-14 | Stop reason: HOSPADM

## 2020-12-08 RX ORDER — INSULIN ASPART 100 [IU]/ML
0-5 INJECTION, SOLUTION INTRAVENOUS; SUBCUTANEOUS
Status: DISCONTINUED | OUTPATIENT
Start: 2020-12-08 | End: 2020-12-14 | Stop reason: HOSPADM

## 2020-12-08 RX ORDER — MORPHINE SULFATE 2 MG/ML
2 INJECTION, SOLUTION INTRAMUSCULAR; INTRAVENOUS EVERY 6 HOURS PRN
Status: DISCONTINUED | OUTPATIENT
Start: 2020-12-08 | End: 2020-12-14 | Stop reason: HOSPADM

## 2020-12-08 RX ORDER — GLUCAGON 1 MG
1 KIT INJECTION
Status: DISCONTINUED | OUTPATIENT
Start: 2020-12-08 | End: 2020-12-14 | Stop reason: HOSPADM

## 2020-12-08 RX ORDER — LOSARTAN POTASSIUM 50 MG/1
50 TABLET ORAL DAILY
Status: DISCONTINUED | OUTPATIENT
Start: 2020-12-09 | End: 2020-12-14

## 2020-12-08 RX ORDER — ATORVASTATIN CALCIUM 20 MG/1
40 TABLET, FILM COATED ORAL NIGHTLY
Status: DISCONTINUED | OUTPATIENT
Start: 2020-12-08 | End: 2020-12-14 | Stop reason: HOSPADM

## 2020-12-08 RX ORDER — IBUPROFEN 200 MG
24 TABLET ORAL
Status: DISCONTINUED | OUTPATIENT
Start: 2020-12-08 | End: 2020-12-14 | Stop reason: HOSPADM

## 2020-12-08 RX ORDER — HYDROCODONE BITARTRATE AND ACETAMINOPHEN 5; 325 MG/1; MG/1
1 TABLET ORAL EVERY 6 HOURS PRN
Status: DISCONTINUED | OUTPATIENT
Start: 2020-12-08 | End: 2020-12-14 | Stop reason: HOSPADM

## 2020-12-08 RX ORDER — SODIUM CHLORIDE 0.9 % (FLUSH) 0.9 %
10 SYRINGE (ML) INJECTION
Status: CANCELLED | OUTPATIENT
Start: 2020-12-08

## 2020-12-08 RX ORDER — TAMSULOSIN HYDROCHLORIDE 0.4 MG/1
0.4 CAPSULE ORAL DAILY
Status: DISCONTINUED | OUTPATIENT
Start: 2020-12-09 | End: 2020-12-14 | Stop reason: HOSPADM

## 2020-12-08 RX ORDER — BISACODYL 10 MG
10 SUPPOSITORY, RECTAL RECTAL DAILY PRN
Status: DISCONTINUED | OUTPATIENT
Start: 2020-12-08 | End: 2020-12-14 | Stop reason: HOSPADM

## 2020-12-08 RX ORDER — SODIUM CHLORIDE 0.9 % (FLUSH) 0.9 %
10 SYRINGE (ML) INJECTION
Status: DISCONTINUED | OUTPATIENT
Start: 2020-12-08 | End: 2020-12-14 | Stop reason: HOSPADM

## 2020-12-08 RX ORDER — ENOXAPARIN SODIUM 100 MG/ML
40 INJECTION SUBCUTANEOUS EVERY 24 HOURS
Status: DISCONTINUED | OUTPATIENT
Start: 2020-12-08 | End: 2020-12-10

## 2020-12-08 RX ORDER — HYDRALAZINE HYDROCHLORIDE 25 MG/1
25 TABLET, FILM COATED ORAL EVERY 8 HOURS PRN
Status: DISCONTINUED | OUTPATIENT
Start: 2020-12-08 | End: 2020-12-14 | Stop reason: HOSPADM

## 2020-12-08 RX ADMIN — ENOXAPARIN SODIUM 40 MG: 40 INJECTION SUBCUTANEOUS at 05:12

## 2020-12-08 RX ADMIN — VANCOMYCIN HYDROCHLORIDE 2500 MG: 1 INJECTION, POWDER, LYOPHILIZED, FOR SOLUTION INTRAVENOUS at 08:12

## 2020-12-08 RX ADMIN — Medication 24 G: at 05:12

## 2020-12-08 RX ADMIN — PIPERACILLIN AND TAZOBACTAM 4.5 G: 4; .5 INJECTION, POWDER, LYOPHILIZED, FOR SOLUTION INTRAVENOUS; PARENTERAL at 05:12

## 2020-12-08 RX ADMIN — ATORVASTATIN CALCIUM 40 MG: 20 TABLET, FILM COATED ORAL at 09:12

## 2020-12-08 RX ADMIN — INSULIN DETEMIR 25 UNITS: 100 INJECTION, SOLUTION SUBCUTANEOUS at 09:12

## 2020-12-08 NOTE — CONSULTS
[facility]  Podiatry  Consult Note    Patient Name: Saji Castañeda  MRN: [id number]  Admission Date: 12/8/2020  Hospital Length of Stay: 0 days  Attending Physician: Emily Salcido MD  Primary Care Provider: Lalo Heath MD     Inpatient consult to Podiatry  Consult performed by: Chaka Canas MD  Consult ordered by: Emily Salcido MD        Subjective:     History of Present Illness:  Saji Castañeda is a 70 y.o. male who  has a past medical history of Arthritis, Diabetes mellitus, Diabetes mellitus, type 2, Hyperlipidemia, and Osteomyelitis.    Patient admitted for worsening and nonhealing right and left lower extremity wounds.  The left is worse than right.  He follows with Podiatry at [facility].  He had been on antibiotics for the last 2 weeks with worsening of the wounds.  The was prompted to come to the ED for evaluation.  He denies any fever, nausea, vomiting or chills.  He admits to pain to bilateral lower extremities that is worse at night before going to sleep.  He reports the pain is a sharp and shooting pain.  He reports history of ambulation but has not been ambulatory since he developed the heel wound on the left lower extremity.      Scheduled Meds:   [START ON 12/9/2020] aspirin  81 mg Oral Daily    atorvastatin  40 mg Oral QHS    enoxaparin  40 mg Subcutaneous Q24H    insulin aspart U-100  8 Units Subcutaneous TIDWM    insulin detemir U-100  25 Units Subcutaneous QHS    [START ON 12/9/2020] losartan  50 mg Oral Daily    [START ON 12/9/2020] multivitamin  1 tablet Oral Daily    piperacillin-tazobactam (ZOSYN) IVPB  4.5 g Intravenous Once    [START ON 12/9/2020] piperacillin-tazobactam (ZOSYN) IVPB  4.5 g Intravenous Q8H    [START ON 12/9/2020] senna-docusate 8.6-50 mg  1 tablet Oral Daily    [START ON 12/9/2020] tamsulosin  0.4 mg Oral Daily    vancomycin (VANCOCIN) IVPB  2,500 mg Intravenous Once    [START ON 12/9/2020] vancomycin (VANCOCIN) IVPB  1,500 mg  Intravenous Q12H    [START ON 12/9/2020] zinc sulfate  220 mg Oral Daily     Continuous Infusions:  PRN Meds:acetaminophen, bisacodyL, dextrose 50%, dextrose 50%, glucagon (human recombinant), glucose, glucose, hydrALAZINE, HYDROcodone-acetaminophen, insulin aspart U-100, melatonin, morphine, ondansetron, sodium chloride 0.9%, Pharmacy to dose Vancomycin consult **AND** vancomycin - pharmacy to dose    Review of patient's allergies indicates:  No Known Allergies     Past Medical History:   Diagnosis Date    Arthritis     legs    Diabetes mellitus     Diabetes mellitus, type 2     Hyperlipidemia     Osteomyelitis      Past Surgical History:   Procedure Laterality Date    FOOT AMPUTATION  October 2010    left high midfoot amputation       Family History     Problem Relation (Age of Onset)    Cancer Brother    Diabetes Mother, Sister    Heart disease Mother    Stroke Sister        Tobacco Use    Smoking status: Never Smoker    Smokeless tobacco: Never Used   Substance and Sexual Activity    Alcohol use: No     Comment: occassional    Drug use: No    Sexual activity: Not Currently     Review of Systems   Constitutional: Negative for chills and fever.   Cardiovascular: Positive for leg swelling.   Gastrointestinal: Negative for nausea and vomiting.   Musculoskeletal: Positive for gait problem.   Skin: Positive for color change and wound.     Objective:     Vital Signs (Most Recent):  Temp: 99.4 °F (37.4 °C) (12/08/20 1233)  Pulse: (!) 46 (12/08/20 1315)  Resp: 18 (12/08/20 1315)  BP: (!) 173/71 (12/08/20 1315)  SpO2: 96 % (12/08/20 1315) Vital Signs (24h Range):  Temp:  [99.4 °F (37.4 °C)] 99.4 °F (37.4 °C)  Pulse:  [46-52] 46  Resp:  [18] 18  SpO2:  [96 %] 96 %  BP: (173)/(71) 173/71     Weight: 128.8 kg (284 lb)  Body mass index is 41.94 kg/m².    Foot Exam    Right Foot/Ankle     Inspection and Palpation  Ecchymosis: none  Tenderness: (Tenderness over the superficial peroneal nerve)  Swelling: (Diffuse  lower extremity edema)  Skin Exam: drainage, dry skin, abnormal color and ulcer;     Neurovascular  Dorsalis pedis: 1+  Posterior tibial: 1+  Saphenous nerve sensation: diminished  Tibial nerve sensation: diminished  Superficial peroneal nerve sensation: diminished  Deep peroneal nerve sensation: diminished  Sural nerve sensation: diminished      Left Foot/Ankle      Inspection and Palpation  Ecchymosis: none  Tenderness: (Tenderness over superficial peroneal nerve)  Swelling: (Diffuse lower extremity edema)  Skin Exam: drainage, dry skin, abnormal color and ulcer;     Neurovascular  Dorsalis pedis: 1+  Posterior tibial: 1+  Saphenous nerve sensation: diminished  Tibial nerve sensation: diminished  Superficial peroneal nerve sensation: diminished  Deep peroneal nerve sensation: diminished  Sural nerve sensation: diminished            Laboratory:  CBC:   Recent Labs   Lab 12/08/20  1326   WBC 6.36   RBC 4.28*   HGB 10.9*   HCT 36.3*      MCV 85   MCH 25.5*   MCHC 30.0*     CMP:   Recent Labs   Lab 12/08/20  1326   *   CALCIUM 9.1   ALBUMIN 2.9*   PROT 8.3      K 4.0   CO2 31*   CL 99   BUN 17   CREATININE 1.3   ALKPHOS 76   ALT 16   AST 18   BILITOT 0.3     CRP:   Recent Labs   Lab 12/08/20  1326   CRP 31.4*     ESR:   Recent Labs   Lab 12/08/20  1326   SEDRATE 80*       Diagnostic Results:  I have reviewed all pertinent imaging results/findings within the past 24 hours.  Imaging Results          X-Ray Ankle Complete Left (Final result)  Result time 12/08/20 15:38:52    Final result by John Thomas MD (12/08/20 15:38:52)                 Impression:      Abnormal findings and recommendations as detailed above.      Electronically signed by: John Thomas  Date:    12/08/2020  Time:    15:38             Narrative:    EXAMINATION:  XR ANKLE COMPLETE 3 VIEW LEFT    CLINICAL HISTORY:  Non-pressure chronic ulcer of other part of unspecified foot with unspecified severity    TECHNIQUE:  AP, lateral  and oblique views of the left ankle were performed.    COMPARISON:  March 18, 2012    FINDINGS:  As before, there has been partial amputation of the left foot to include most of the midfoot osseous structures, metatarsals, and toes.  With respect to the remaining hindfoot structures, reconfirmed osteopenia and spurring about the Achilles tendon and plantar aponeurosis attachments.  With respect to the remaining tarsal bones, on the AP view, new areas of suggested cortical irregularity and bone loss of concern for osteomyelitis.  There is associated soft tissue swelling about the stump with areas of questionable soft tissue gas.  Would suggest further evaluation with MRI.  Extensive small vessel vascular calcifications.                               X-Ray Tibia Fibula 2 View Left (Final result)  Result time 12/08/20 14:35:39    Final result by Leonel Palma III, MD (12/08/20 14:35:39)                 Narrative:    EXAMINATION:  XR TIBIA FIBULA 2 VIEW LEFT    CLINICAL HISTORY:  Unspecified open wound, left lower leg, initial encounter    FINDINGS:  Two views tib fib left: There is baseline DJD and soft tissue swelling.  No fracture dislocation bone destruction seen.  There is severe degenerative change of the ankle and hindfoot.  If osteomyelitis is suspected MRI could be helpful.      Electronically signed by: Leonel Palma MD  Date:    12/08/2020  Time:    14:35                             X-Ray Chest AP Portable (Final result)  Result time 12/08/20 14:32:43    Final result by Meli Witt MD (12/08/20 14:32:43)                 Impression:      No pneumonia or other source of sepsis identified.      Electronically signed by: Meli Witt MD  Date:    12/08/2020  Time:    14:32             Narrative:    EXAMINATION:  XR CHEST AP PORTABLE    CLINICAL HISTORY:  Sepsis;    TECHNIQUE:  Single frontal view of the chest was performed.    COMPARISON:  10/15/2018.  03/18/2012.    FINDINGS:  X-ray beam attenuation  and scatter occur in generous overlying soft tissues.    Mediastinal structures are midline. Cardiac silhouette and pulmonary vascular distribution are normal.    Lung volumes are normal and symmetric. I detect no pulmonary disease, pleural fluid, lymph node enlargement, cardiac decompensation, pneumothorax, pneumomediastinum, pneumoperitoneum or significant osseous abnormality.                                Clinical Findings:  Left lower extremity  12/08/2020  Patient has multiple lower leg wounds that are superficial with minor drainage.  There are no periwound erythema, fluctuance or crepitus.  No purulent drainage.    His anterior and posterior foot wound are much deeper.  The posterior heel wound is about 1 cm deep but does not probe or have any exposed bone.  There is no periwound signs of infection to the posterior heel wound.    The anterior wound is deep with bony exposure at the lateral part of the wound.                  Right lower extremity  12/08/2020  Superficial wounds without any significant signs of infection.  There is some minor drainage otherwise there is no fluctuance or crepitus.                  Assessment/Plan:     Osteomyelitis  Patient has bilateral lower extremity venous stasis ulcerations.  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  The anterior wound has exposed bone with likely osteomyelitis.    Plan:  - MRI of the foot is pending.  - plan for bone biopsy tomorrow for antibiotic guidance..  - will likely need 6 weeks of IV antibiotics and Infectious Disease consult.  - nursing to continue local wound care as ordered.  - offload the heels with Z flex boot  - podiatry will follow        Thank you for your consult. I will follow-up with patient. Please contact us if you have any additional questions.    Chaka Canas MD  Podiatry  [facility]

## 2020-12-08 NOTE — ED TRIAGE NOTES
Pt states that the wounds on his lower legs haven't healed for 90 days. Has previously been on antibiotics and said the wounds have not yet resolved.

## 2020-12-08 NOTE — HPI
Saji Catsañeda is a 70 y.o. male who  has a past medical history of Arthritis, Diabetes mellitus, Diabetes mellitus, type 2, Hyperlipidemia, and Osteomyelitis.    Patient admitted for worsening and nonhealing right and left lower extremity wounds.  The left is worse than right.  He follows with Podiatry at [facility].  He had been on antibiotics for the last 2 weeks with worsening of the wounds.  The was prompted to come to the ED for evaluation.  He denies any fever, nausea, vomiting or chills.  He admits to pain to bilateral lower extremities that is worse at night before going to sleep.  He reports the pain is a sharp and shooting pain.  He reports history of ambulation but has not been ambulatory since he developed the heel wound on the left lower extremity.

## 2020-12-08 NOTE — ASSESSMENT & PLAN NOTE
Patient has bilateral lower extremity venous stasis ulcerations.  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  The anterior wound has exposed bone with likely osteomyelitis.    Plan:  - MRI of the foot is pending.  - plan for bone biopsy tomorrow for antibiotic guidance..  - will likely need 6 weeks of IV antibiotics and Infectious Disease consult.  - nursing to continue local wound care as ordered.  - offload the heels with Z flex boot  - podiatry will follow

## 2020-12-08 NOTE — PROGRESS NOTES
Pharmacokinetic Initial Assessment: IV Vancomycin    Assessment/Plan:    Initiate intravenous vancomycin with loading dose of 2500 mg once followed by a maintenance dose of vancomycin 1500 mg IV every 12 hours.  Desired empiric serum trough concentration is 15 to 20 mcg/mL.  Draw vancomycin trough level 60 min prior to fourth dose on 12/10/2020 at 0500.  Pharmacy will continue to follow and monitor vancomycin.      Please contact pharmacy at extension [phone] with any questions regarding this assessment.     Thank you for the consult,   Maryam Soni       Patient brief summary:  Saji Castañeda is a 70 y.o. male initiated on antimicrobial therapy with IV Vancomycin for treatment of suspected bone/joint infection.    Drug Allergies:   Review of patient's allergies indicates:  No Known Allergies    Actual Body Weight:   128.8 kg    Renal Function:   Estimated Creatinine Clearance: 69.2 mL/min (based on SCr of 1.3 mg/dL).    CBC (last 72 hours):  Recent Labs   Lab Result Units 12/08/20  1326   WBC K/uL 6.36   Hemoglobin g/dL 10.9*   Hematocrit % 36.3*   Platelets K/uL 325   Gran % % 62.6   Lymph % % 22.5   Mono % % 9.6   Eosinophil % % 4.7   Basophil % % 0.3   Differential Method  Automated       Metabolic Panel (last 72 hours):  Recent Labs   Lab Result Units 12/08/20  1326   Sodium mmol/L 139   Potassium mmol/L 4.0   Chloride mmol/L 99   CO2 mmol/L 31*   Glucose mg/dL 163*   BUN mg/dL 17   Creatinine mg/dL 1.3   Albumin g/dL 2.9*   Total Bilirubin mg/dL 0.3   Alkaline Phosphatase U/L 76   AST U/L 18   ALT U/L 16       Drug levels (last 3 results):  No results for input(s): VANCOMYCINRA, VANCOMYCINPE, VANCOMYCINTR in the last 72 hours.    Microbiologic Results:  Microbiology Results (last 7 days)     Procedure Component Value Units Date/Time    Blood culture x two cultures. Draw prior to antibiotics. [[id number]] Collected: 12/08/20 1326    Order Status: Sent Specimen: Blood from Peripheral, Hand, Left Updated:  12/08/20 1446    Blood culture x two cultures. Draw prior to antibiotics. [[id number]] Collected: 12/08/20 1326    Order Status: Sent Specimen: Blood from Peripheral, Hand, Right Updated: 12/08/20 1446

## 2020-12-08 NOTE — SUBJECTIVE & OBJECTIVE
Scheduled Meds:   [START ON 12/9/2020] aspirin  81 mg Oral Daily    atorvastatin  40 mg Oral QHS    enoxaparin  40 mg Subcutaneous Q24H    insulin aspart U-100  8 Units Subcutaneous TIDWM    insulin detemir U-100  25 Units Subcutaneous QHS    [START ON 12/9/2020] losartan  50 mg Oral Daily    [START ON 12/9/2020] multivitamin  1 tablet Oral Daily    piperacillin-tazobactam (ZOSYN) IVPB  4.5 g Intravenous Once    [START ON 12/9/2020] piperacillin-tazobactam (ZOSYN) IVPB  4.5 g Intravenous Q8H    [START ON 12/9/2020] senna-docusate 8.6-50 mg  1 tablet Oral Daily    [START ON 12/9/2020] tamsulosin  0.4 mg Oral Daily    vancomycin (VANCOCIN) IVPB  2,500 mg Intravenous Once    [START ON 12/9/2020] vancomycin (VANCOCIN) IVPB  1,500 mg Intravenous Q12H    [START ON 12/9/2020] zinc sulfate  220 mg Oral Daily     Continuous Infusions:  PRN Meds:acetaminophen, bisacodyL, dextrose 50%, dextrose 50%, glucagon (human recombinant), glucose, glucose, hydrALAZINE, HYDROcodone-acetaminophen, insulin aspart U-100, melatonin, morphine, ondansetron, sodium chloride 0.9%, Pharmacy to dose Vancomycin consult **AND** vancomycin - pharmacy to dose    Review of patient's allergies indicates:  No Known Allergies     Past Medical History:   Diagnosis Date    Arthritis     legs    Diabetes mellitus     Diabetes mellitus, type 2     Hyperlipidemia     Osteomyelitis      Past Surgical History:   Procedure Laterality Date    FOOT AMPUTATION  October 2010    left high midfoot amputation       Family History     Problem Relation (Age of Onset)    Cancer Brother    Diabetes Mother, Sister    Heart disease Mother    Stroke Sister        Tobacco Use    Smoking status: Never Smoker    Smokeless tobacco: Never Used   Substance and Sexual Activity    Alcohol use: No     Comment: occassional    Drug use: No    Sexual activity: Not Currently     Review of Systems   Constitutional: Negative for chills and fever.   Cardiovascular:  Positive for leg swelling.   Gastrointestinal: Negative for nausea and vomiting.   Musculoskeletal: Positive for gait problem.   Skin: Positive for color change and wound.     Objective:     Vital Signs (Most Recent):  Temp: 99.4 °F (37.4 °C) (12/08/20 1233)  Pulse: (!) 46 (12/08/20 1315)  Resp: 18 (12/08/20 1315)  BP: (!) 173/71 (12/08/20 1315)  SpO2: 96 % (12/08/20 1315) Vital Signs (24h Range):  Temp:  [99.4 °F (37.4 °C)] 99.4 °F (37.4 °C)  Pulse:  [46-52] 46  Resp:  [18] 18  SpO2:  [96 %] 96 %  BP: (173)/(71) 173/71     Weight: 128.8 kg (284 lb)  Body mass index is 41.94 kg/m².    Foot Exam    Right Foot/Ankle     Inspection and Palpation  Ecchymosis: none  Tenderness: (Tenderness over the superficial peroneal nerve)  Swelling: (Diffuse lower extremity edema)  Skin Exam: drainage, dry skin, abnormal color and ulcer;     Neurovascular  Dorsalis pedis: 1+  Posterior tibial: 1+  Saphenous nerve sensation: diminished  Tibial nerve sensation: diminished  Superficial peroneal nerve sensation: diminished  Deep peroneal nerve sensation: diminished  Sural nerve sensation: diminished      Left Foot/Ankle      Inspection and Palpation  Ecchymosis: none  Tenderness: (Tenderness over superficial peroneal nerve)  Swelling: (Diffuse lower extremity edema)  Skin Exam: drainage, dry skin, abnormal color and ulcer;     Neurovascular  Dorsalis pedis: 1+  Posterior tibial: 1+  Saphenous nerve sensation: diminished  Tibial nerve sensation: diminished  Superficial peroneal nerve sensation: diminished  Deep peroneal nerve sensation: diminished  Sural nerve sensation: diminished            Laboratory:  CBC:   Recent Labs   Lab 12/08/20  1326   WBC 6.36   RBC 4.28*   HGB 10.9*   HCT 36.3*      MCV 85   MCH 25.5*   MCHC 30.0*     CMP:   Recent Labs   Lab 12/08/20  1326   *   CALCIUM 9.1   ALBUMIN 2.9*   PROT 8.3      K 4.0   CO2 31*   CL 99   BUN 17   CREATININE 1.3   ALKPHOS 76   ALT 16   AST 18   BILITOT 0.3      CRP:   Recent Labs   Lab 12/08/20  1326   CRP 31.4*     ESR:   Recent Labs   Lab 12/08/20  1326   SEDRATE 80*       Diagnostic Results:  I have reviewed all pertinent imaging results/findings within the past 24 hours.  Imaging Results          X-Ray Ankle Complete Left (Final result)  Result time 12/08/20 15:38:52    Final result by John Thomas MD (12/08/20 15:38:52)                 Impression:      Abnormal findings and recommendations as detailed above.      Electronically signed by: John Thomas  Date:    12/08/2020  Time:    15:38             Narrative:    EXAMINATION:  XR ANKLE COMPLETE 3 VIEW LEFT    CLINICAL HISTORY:  Non-pressure chronic ulcer of other part of unspecified foot with unspecified severity    TECHNIQUE:  AP, lateral and oblique views of the left ankle were performed.    COMPARISON:  March 18, 2012    FINDINGS:  As before, there has been partial amputation of the left foot to include most of the midfoot osseous structures, metatarsals, and toes.  With respect to the remaining hindfoot structures, reconfirmed osteopenia and spurring about the Achilles tendon and plantar aponeurosis attachments.  With respect to the remaining tarsal bones, on the AP view, new areas of suggested cortical irregularity and bone loss of concern for osteomyelitis.  There is associated soft tissue swelling about the stump with areas of questionable soft tissue gas.  Would suggest further evaluation with MRI.  Extensive small vessel vascular calcifications.                               X-Ray Tibia Fibula 2 View Left (Final result)  Result time 12/08/20 14:35:39    Final result by Leonel Palma III, MD (12/08/20 14:35:39)                 Narrative:    EXAMINATION:  XR TIBIA FIBULA 2 VIEW LEFT    CLINICAL HISTORY:  Unspecified open wound, left lower leg, initial encounter    FINDINGS:  Two views tib fib left: There is baseline DJD and soft tissue swelling.  No fracture dislocation bone destruction seen.  There  is severe degenerative change of the ankle and hindfoot.  If osteomyelitis is suspected MRI could be helpful.      Electronically signed by: Leonel Palma MD  Date:    12/08/2020  Time:    14:35                             X-Ray Chest AP Portable (Final result)  Result time 12/08/20 14:32:43    Final result by Meli Witt MD (12/08/20 14:32:43)                 Impression:      No pneumonia or other source of sepsis identified.      Electronically signed by: Meli Witt MD  Date:    12/08/2020  Time:    14:32             Narrative:    EXAMINATION:  XR CHEST AP PORTABLE    CLINICAL HISTORY:  Sepsis;    TECHNIQUE:  Single frontal view of the chest was performed.    COMPARISON:  10/15/2018.  03/18/2012.    FINDINGS:  X-ray beam attenuation and scatter occur in generous overlying soft tissues.    Mediastinal structures are midline. Cardiac silhouette and pulmonary vascular distribution are normal.    Lung volumes are normal and symmetric. I detect no pulmonary disease, pleural fluid, lymph node enlargement, cardiac decompensation, pneumothorax, pneumomediastinum, pneumoperitoneum or significant osseous abnormality.                                Clinical Findings:  Left lower extremity  12/08/2020  Patient has multiple lower leg wounds that are superficial with minor drainage.  There are no periwound erythema, fluctuance or crepitus.  No purulent drainage.    His anterior and posterior foot wound are much deeper.  The posterior heel wound is about 1 cm deep but does not probe or have any exposed bone.  There is no periwound signs of infection to the posterior heel wound.    The anterior wound is deep with bony exposure at the lateral part of the wound.                  Right lower extremity  12/08/2020  Superficial wounds without any significant signs of infection.  There is some minor drainage otherwise there is no fluctuance or crepitus.

## 2020-12-08 NOTE — ED PROVIDER NOTES
Encounter Date: 12/8/2020       History     Chief Complaint   Patient presents with    Wound Infection     to both lower legs, draining, finished antibiotics last week     71 yo m, h/o DM, HTN, left foot amputation, chronic LE wounds, c/o worsening appearance and pain to LE wounds, L > R.  Pt reports that wounds on his LE have been present for months but he feels that they are worsening.  He was on antibiotics recently, completed 2 weeks ago.  He currently has a home health nurse coming to his home 3x/week to provide wound care, last visit yesterday.  Denies fever/chills.  No n/v/d.     The history is provided by the patient.     Review of patient's allergies indicates:  No Known Allergies  Past Medical History:   Diagnosis Date    Arthritis     legs    Diabetes mellitus     Diabetes mellitus, type 2     Hyperlipidemia     Osteomyelitis      Past Surgical History:   Procedure Laterality Date    FOOT AMPUTATION  October 2010    left high midfoot amputation     Family History   Problem Relation Age of Onset    Diabetes Mother     Heart disease Mother     Stroke Sister     Cancer Brother     Diabetes Sister      Social History     Tobacco Use    Smoking status: Never Smoker    Smokeless tobacco: Never Used   Substance Use Topics    Alcohol use: No     Comment: occassional    Drug use: No     Review of Systems   Constitutional: Negative for chills and fever.   Respiratory: Negative for shortness of breath.    Cardiovascular: Negative for chest pain.   Gastrointestinal: Negative for abdominal pain.   All other systems reviewed and are negative.      Physical Exam     Initial Vitals   BP Pulse Resp Temp SpO2   12/08/20 1315 12/08/20 1233 12/08/20 1233 12/08/20 1233 12/08/20 1233   (!) 173/71 (!) 52 18 99.4 °F (37.4 °C) 96 %      MAP       --                Physical Exam    Nursing note and vitals reviewed.  Constitutional: He is not diaphoretic. He is Obese . He does not appear ill. No distress.   HENT:    Mouth/Throat: Oropharynx is clear and moist.   Eyes: Conjunctivae are normal.   Neck: Neck supple.   Cardiovascular: Normal rate and regular rhythm.   Pulmonary/Chest: No respiratory distress.   Abdominal: Soft. He exhibits no distension. There is no abdominal tenderness.   Musculoskeletal:      Comments: L LE:  Skin is dry, scaly.  Distal stump is black with several ulcerations noted, foul-smelling.  Skin seems warm.  Green drainage on dressing    R LE: skin hyperpigmented but no actual ulcerations, area of drainage, unable to palpate DP pulse   Neurological: He is alert and oriented to person, place, and time.   Skin: Rash noted.                 ED Course   Procedures  Labs Reviewed   CBC W/ AUTO DIFFERENTIAL - Abnormal; Notable for the following components:       Result Value    RBC 4.28 (*)     Hemoglobin 10.9 (*)     Hematocrit 36.3 (*)     MCH 25.5 (*)     MCHC 30.0 (*)     RDW 16.8 (*)     All other components within normal limits   COMPREHENSIVE METABOLIC PANEL - Abnormal; Notable for the following components:    CO2 31 (*)     Glucose 163 (*)     Albumin 2.9 (*)     eGFR if non  54.9 (*)     All other components within normal limits   LACTIC ACID, PLASMA - Abnormal; Notable for the following components:    Lactate (Lactic Acid) 2.5 (*)     All other components within normal limits   URINALYSIS, REFLEX TO URINE CULTURE - Abnormal; Notable for the following components:    Protein, UA 2+ (*)     Occult Blood UA 1+ (*)     Leukocytes, UA Trace (*)     All other components within normal limits    Narrative:     Specimen Source->Urine   SEDIMENTATION RATE - Abnormal; Notable for the following components:    Sed Rate 80 (*)     All other components within normal limits   C-REACTIVE PROTEIN - Abnormal; Notable for the following components:    CRP 31.4 (*)     All other components within normal limits   URINALYSIS MICROSCOPIC - Abnormal; Notable for the following components:    RBC, UA 5 (*)      WBC, UA 36 (*)     Yeast, UA Rare (*)     All other components within normal limits    Narrative:     Specimen Source->Urine   HEMOGLOBIN A1C - Abnormal; Notable for the following components:    Hemoglobin A1C 7.9 (*)     Estimated Avg Glucose 180 (*)     All other components within normal limits    Narrative:     ADD-ON [facility] #[id number] PER CHER ANDRE RN  12/08/2020  20:41    POCT GLUCOSE - Abnormal; Notable for the following components:    POCT Glucose 49 (*)     All other components within normal limits   POCT GLUCOSE - Abnormal; Notable for the following components:    POCT Glucose 185 (*)     All other components within normal limits   CULTURE, BLOOD   CULTURE, BLOOD   CULTURE, URINE   PROCALCITONIN   LACTIC ACID, PLASMA   HEMOGLOBIN A1C   SARS-COV-2 RDRP GENE    Narrative:     This test utilizes isothermal nucleic acid amplification   technology to detect the SARS-CoV-2 RdRp nucleic acid segment.   The analytical sensitivity (limit of detection) is 125 genome   equivalents/mL.   A POSITIVE result implies infection with the SARS-CoV-2 virus;   the patient is presumed to be contagious.     A NEGATIVE result means that SARS-CoV-2 nucleic acids are not   present above the limit of detection. A NEGATIVE result should be   treated as presumptive. It does not rule out the possibility of   COVID-19 and should not be the sole basis for treatment decisions.   If COVID-19 is strongly suspected based on clinical and exposure   history, re-testing using an alternate molecular assay should be   considered.   This test is only for use under the Food and Drug   Administration s Emergency Use Authorization (EUA).   Commercial kits are provided by [employer].   Performance characteristics of the EUA have been independently   verified by [facility] Department of   Pathology and Laboratory Medicine.   _________________________________________________________________   The authorized Fact Sheet for Healthcare  Providers and the authorized Fact   Sheet for Patients of the ID NOW COVID-19 are available on the FDA   website:     https://www.fda.gov/media/950262/download  https://www.fda.gov/media/504516/download       POCT GLUCOSE   POCT GLUCOSE MONITORING CONTINUOUS          Imaging Results          MRI Tibia Fibula Without Contrast Left (Final result)  Result time 12/08/20 20:40:53    Final result by Milton Dove MD (12/08/20 20:40:53)                 Impression:      In this exam severely limited secondary to patient motion artifact, there is diffuse skin thickening and soft tissue edema throughout the left lower extremity, concerning for cellulitis.  Marrow signal of the tibia and fibula are normal without evidence of infiltrative process or edema to suggest osteomyelitis.    Surgical changes of foot amputation.  Increased STIR signal within the osseous structures of the remaining forefoot is suspected to reflect that of artifact given motion versus surgical change.  No associated low T1 signal to suggest definitively changes of osteomyelitis.  If there is continued concern, consider MRI of the foot for further evaluation.    Extensive intramuscular edema throughout the musculature of the left lower extremity.    Electronically signed by resident: Blayne Flores  Date:    12/08/2020  Time:    19:52    Electronically signed by: Milton Dove MD  Date:    12/08/2020  Time:    20:40             Narrative:    EXAMINATION:  MRI TIBIA FIBULA WITHOUT CONTRAST LEFT    CLINICAL HISTORY:  Lower leg pain, osteomyelitis suspected, initial exam;  Pain in left lower leg    TECHNIQUE:  Multiplanar, multisequence imaging was obtained of the left lower extremity (lower leg) without the use of intravenous contrast.    COMPARISON:  Radiograph tibia-fibula two view left 12/08/2020.    FINDINGS:  Exam severely limited secondary to patient motion artifact.    Osseous structures are intact.  No acute fracture.  There is edema like signal  involving the remaining osseous structures of the distal forefoot, versus artifact.  There does not appear to be convincing low T1 signal in the structures to convincingly reflect change of osteomyelitis.  There is slight increased STIR signal in the distal aspect of the calcaneus, this may reflect small focus of marrow edema however no convincing low T1 signal.    Left ankle joint alignment is within normal limits.  No joint effusions noted.    Extensive intramuscular edema throughout the musculature of the left lower extremity.    Diffuse skin thickening and soft tissue edema throughout the left lower extremity.    Visualized vascular structures are unremarkable.                               X-Ray Ankle Complete Left (Final result)  Result time 12/08/20 15:38:52    Final result by John Thomas MD (12/08/20 15:38:52)                 Impression:      Abnormal findings and recommendations as detailed above.      Electronically signed by: John Thomas  Date:    12/08/2020  Time:    15:38             Narrative:    EXAMINATION:  XR ANKLE COMPLETE 3 VIEW LEFT    CLINICAL HISTORY:  Non-pressure chronic ulcer of other part of unspecified foot with unspecified severity    TECHNIQUE:  AP, lateral and oblique views of the left ankle were performed.    COMPARISON:  March 18, 2012    FINDINGS:  As before, there has been partial amputation of the left foot to include most of the midfoot osseous structures, metatarsals, and toes.  With respect to the remaining hindfoot structures, reconfirmed osteopenia and spurring about the Achilles tendon and plantar aponeurosis attachments.  With respect to the remaining tarsal bones, on the AP view, new areas of suggested cortical irregularity and bone loss of concern for osteomyelitis.  There is associated soft tissue swelling about the stump with areas of questionable soft tissue gas.  Would suggest further evaluation with MRI.  Extensive small vessel vascular calcifications.                                X-Ray Tibia Fibula 2 View Left (Final result)  Result time 12/08/20 14:35:39    Final result by Leonel Palma III, MD (12/08/20 14:35:39)                 Narrative:    EXAMINATION:  XR TIBIA FIBULA 2 VIEW LEFT    CLINICAL HISTORY:  Unspecified open wound, left lower leg, initial encounter    FINDINGS:  Two views tib fib left: There is baseline DJD and soft tissue swelling.  No fracture dislocation bone destruction seen.  There is severe degenerative change of the ankle and hindfoot.  If osteomyelitis is suspected MRI could be helpful.      Electronically signed by: Leonel Palma MD  Date:    12/08/2020  Time:    14:35                             X-Ray Chest AP Portable (Final result)  Result time 12/08/20 14:32:43    Final result by Meli Witt MD (12/08/20 14:32:43)                 Impression:      No pneumonia or other source of sepsis identified.      Electronically signed by: Meli Witt MD  Date:    12/08/2020  Time:    14:32             Narrative:    EXAMINATION:  XR CHEST AP PORTABLE    CLINICAL HISTORY:  Sepsis;    TECHNIQUE:  Single frontal view of the chest was performed.    COMPARISON:  10/15/2018.  03/18/2012.    FINDINGS:  X-ray beam attenuation and scatter occur in generous overlying soft tissues.    Mediastinal structures are midline. Cardiac silhouette and pulmonary vascular distribution are normal.    Lung volumes are normal and symmetric. I detect no pulmonary disease, pleural fluid, lymph node enlargement, cardiac decompensation, pneumothorax, pneumomediastinum, pneumoperitoneum or significant osseous abnormality.                                 Medical Decision Making:   History:   Old Medical Records: I decided to obtain old medical records.  Initial Assessment:   71 yo m, complex PMH as above, now here with worsening appearance of LE wounds, L>R    Low grade temp  Differential Diagnosis:   R/o underlying osteomyelitis  Worsening ischemia  Clinical Tests:    Lab Tests: Ordered and Reviewed  Radiological Study: Reviewed and Ordered  Medical Tests: Ordered and Reviewed  ED Management:  Labs, including cultures, ESR/CRP  xrays  Likely admit                   ED Course as of Dec 08 2153   Tue Dec 08, 2020   1640 Osteomyelitis.  Given broad spectrum antibiotics and admitted to medicine for further management   X-Ray Ankle Complete Left [AS]      ED Course User Index  [AS] Kendra Huynh MD            Clinical Impression:       ICD-10-CM ICD-9-CM   1. Osteomyelitis, unspecified site, unspecified type  M86.9 730.20   2. Leg wound, left  S81.802A 891.0   3. Foot ulcer  L97.509 707.15   4. Pain of left lower leg  M79.662 729.5   5. Type 2 diabetes, uncontrolled, with neuropathy  E11.40 250.62    E11.65 357.2   6. Type II diabetes mellitus with complication, uncontrolled  E11.8 250.92    E11.65    7. Open wound of left foot with complication, initial encounter  S91.302A 892.1                          ED Disposition Condition    Admit                             Kendra Huynh MD  12/08/20 5105

## 2020-12-08 NOTE — PLAN OF CARE
Patient seen and examined    Open wound Left leg   Concern for osteomyelitis  MRI LE  Ultrasound arterial  Failed outpatient abx - continue broad spectrum  Levemir 25 nightly and novolog with meals - will need to adjust  Continue aspirin and plavix  Podiatry assessment of left heel as this is predominant issue    Emily Salcido

## 2020-12-09 LAB
ALBUMIN SERPL BCP-MCNC: 2.3 G/DL (ref 3.5–5.2)
ALP SERPL-CCNC: 70 U/L (ref 55–135)
ALT SERPL W/O P-5'-P-CCNC: 13 U/L (ref 10–44)
ANION GAP SERPL CALC-SCNC: 8 MMOL/L (ref 8–16)
AST SERPL-CCNC: 17 U/L (ref 10–40)
BACTERIA UR CULT: NO GROWTH
BASOPHILS # BLD AUTO: 0.03 K/UL (ref 0–0.2)
BASOPHILS NFR BLD: 0.4 % (ref 0–1.9)
BILIRUB SERPL-MCNC: 0.3 MG/DL (ref 0.1–1)
BUN SERPL-MCNC: 16 MG/DL (ref 8–23)
CALCIUM SERPL-MCNC: 8.6 MG/DL (ref 8.7–10.5)
CHLORIDE SERPL-SCNC: 101 MMOL/L (ref 95–110)
CO2 SERPL-SCNC: 28 MMOL/L (ref 23–29)
CREAT SERPL-MCNC: 1.2 MG/DL (ref 0.5–1.4)
DIFFERENTIAL METHOD: ABNORMAL
EOSINOPHIL # BLD AUTO: 0.2 K/UL (ref 0–0.5)
EOSINOPHIL NFR BLD: 2.4 % (ref 0–8)
ERYTHROCYTE [DISTWIDTH] IN BLOOD BY AUTOMATED COUNT: 16.6 % (ref 11.5–14.5)
EST. GFR  (AFRICAN AMERICAN): >60 ML/MIN/1.73 M^2
EST. GFR  (NON AFRICAN AMERICAN): >60 ML/MIN/1.73 M^2
GLUCOSE SERPL-MCNC: 117 MG/DL (ref 70–110)
GRAM STN SPEC: NORMAL
HCT VFR BLD AUTO: 31 % (ref 40–54)
HGB BLD-MCNC: 9.5 G/DL (ref 14–18)
IMM GRANULOCYTES # BLD AUTO: 0.04 K/UL (ref 0–0.04)
IMM GRANULOCYTES NFR BLD AUTO: 0.5 % (ref 0–0.5)
LYMPHOCYTES # BLD AUTO: 1.3 K/UL (ref 1–4.8)
LYMPHOCYTES NFR BLD: 16.6 % (ref 18–48)
MCH RBC QN AUTO: 25.7 PG (ref 27–31)
MCHC RBC AUTO-ENTMCNC: 30.6 G/DL (ref 32–36)
MCV RBC AUTO: 84 FL (ref 82–98)
MONOCYTES # BLD AUTO: 0.8 K/UL (ref 0.3–1)
MONOCYTES NFR BLD: 9.6 % (ref 4–15)
NEUTROPHILS # BLD AUTO: 5.6 K/UL (ref 1.8–7.7)
NEUTROPHILS NFR BLD: 70.5 % (ref 38–73)
NRBC BLD-RTO: 0 /100 WBC
PLATELET # BLD AUTO: 299 K/UL (ref 150–350)
PMV BLD AUTO: 9.4 FL (ref 9.2–12.9)
POCT GLUCOSE: 115 MG/DL (ref 70–110)
POCT GLUCOSE: 138 MG/DL (ref 70–110)
POCT GLUCOSE: 150 MG/DL (ref 70–110)
POCT GLUCOSE: 168 MG/DL (ref 70–110)
POTASSIUM SERPL-SCNC: 3.8 MMOL/L (ref 3.5–5.1)
PROT SERPL-MCNC: 6.8 G/DL (ref 6–8.4)
RBC # BLD AUTO: 3.7 M/UL (ref 4.6–6.2)
SODIUM SERPL-SCNC: 137 MMOL/L (ref 136–145)
WBC # BLD AUTO: 7.9 K/UL (ref 3.9–12.7)

## 2020-12-09 PROCEDURE — 25000003 PHARM REV CODE 250: Performed by: HOSPITALIST

## 2020-12-09 PROCEDURE — 87206 SMEAR FLUORESCENT/ACID STAI: CPT

## 2020-12-09 PROCEDURE — 93005 ELECTROCARDIOGRAM TRACING: CPT

## 2020-12-09 PROCEDURE — 86580 TB INTRADERMAL TEST: CPT | Performed by: HOSPITALIST

## 2020-12-09 PROCEDURE — 88311 PR  DECALCIFY TISSUE: ICD-10-PCS | Mod: 26,,, | Performed by: PATHOLOGY

## 2020-12-09 PROCEDURE — 25000003 PHARM REV CODE 250: Performed by: PHYSICIAN ASSISTANT

## 2020-12-09 PROCEDURE — 97165 OT EVAL LOW COMPLEX 30 MIN: CPT

## 2020-12-09 PROCEDURE — 30200315 PPD INTRADERMAL TEST REV CODE 302: Performed by: HOSPITALIST

## 2020-12-09 PROCEDURE — 63600175 PHARM REV CODE 636 W HCPCS: Performed by: PHYSICIAN ASSISTANT

## 2020-12-09 PROCEDURE — 99223 1ST HOSP IP/OBS HIGH 75: CPT | Mod: ,,, | Performed by: PHYSICIAN ASSISTANT

## 2020-12-09 PROCEDURE — 93010 EKG 12-LEAD: ICD-10-PCS | Mod: ,,, | Performed by: INTERNAL MEDICINE

## 2020-12-09 PROCEDURE — 87205 SMEAR GRAM STAIN: CPT

## 2020-12-09 PROCEDURE — 85025 COMPLETE CBC W/AUTO DIFF WBC: CPT

## 2020-12-09 PROCEDURE — 88307 PR  SURG PATH,LEVEL V: ICD-10-PCS | Mod: 26,,, | Performed by: PATHOLOGY

## 2020-12-09 PROCEDURE — 87075 CULTR BACTERIA EXCEPT BLOOD: CPT

## 2020-12-09 PROCEDURE — 93010 ELECTROCARDIOGRAM REPORT: CPT | Mod: ,,, | Performed by: INTERNAL MEDICINE

## 2020-12-09 PROCEDURE — 87186 SC STD MICRODIL/AGAR DIL: CPT

## 2020-12-09 PROCEDURE — 99233 SBSQ HOSP IP/OBS HIGH 50: CPT | Mod: 25,,, | Performed by: PODIATRIST

## 2020-12-09 PROCEDURE — 88311 DECALCIFY TISSUE: CPT | Mod: 26,,, | Performed by: PATHOLOGY

## 2020-12-09 PROCEDURE — 63600175 PHARM REV CODE 636 W HCPCS: Performed by: HOSPITALIST

## 2020-12-09 PROCEDURE — 97161 PT EVAL LOW COMPLEX 20 MIN: CPT

## 2020-12-09 PROCEDURE — 11044 DBRDMT BONE 1ST 20 SQ CM/<: CPT | Mod: ,,, | Performed by: PODIATRIST

## 2020-12-09 PROCEDURE — 88307 TISSUE EXAM BY PATHOLOGIST: CPT | Mod: 26,,, | Performed by: PATHOLOGY

## 2020-12-09 PROCEDURE — 87070 CULTURE OTHR SPECIMN AEROBIC: CPT

## 2020-12-09 PROCEDURE — 99233 PR SUBSEQUENT HOSPITAL CARE,LEVL III: ICD-10-PCS | Mod: 25,,, | Performed by: PODIATRIST

## 2020-12-09 PROCEDURE — 97542 WHEELCHAIR MNGMENT TRAINING: CPT

## 2020-12-09 PROCEDURE — 88311 DECALCIFY TISSUE: CPT | Performed by: PATHOLOGY

## 2020-12-09 PROCEDURE — 87102 FUNGUS ISOLATION CULTURE: CPT

## 2020-12-09 PROCEDURE — 87077 CULTURE AEROBIC IDENTIFY: CPT

## 2020-12-09 PROCEDURE — 99223 PR INITIAL HOSPITAL CARE,LEVL III: ICD-10-PCS | Mod: ,,, | Performed by: PHYSICIAN ASSISTANT

## 2020-12-09 PROCEDURE — 87116 MYCOBACTERIA CULTURE: CPT

## 2020-12-09 PROCEDURE — 11000001 HC ACUTE MED/SURG PRIVATE ROOM

## 2020-12-09 PROCEDURE — 87015 SPECIMEN INFECT AGNT CONCNTJ: CPT

## 2020-12-09 PROCEDURE — 97535 SELF CARE MNGMENT TRAINING: CPT

## 2020-12-09 PROCEDURE — 88307 TISSUE EXAM BY PATHOLOGIST: CPT | Performed by: PATHOLOGY

## 2020-12-09 PROCEDURE — 11044 PR DEBRIDEMENT, SKIN, SUB-Q TISSUE,MUSCLE,BONE,=<20 SQ CM: ICD-10-PCS | Mod: ,,, | Performed by: PODIATRIST

## 2020-12-09 PROCEDURE — 80053 COMPREHEN METABOLIC PANEL: CPT

## 2020-12-09 RX ORDER — METRONIDAZOLE 500 MG/1
500 TABLET ORAL EVERY 8 HOURS
Status: DISCONTINUED | OUTPATIENT
Start: 2020-12-09 | End: 2020-12-11

## 2020-12-09 RX ORDER — CEFEPIME HYDROCHLORIDE 1 G/1
2 INJECTION, POWDER, FOR SOLUTION INTRAMUSCULAR; INTRAVENOUS
Status: DISCONTINUED | OUTPATIENT
Start: 2020-12-09 | End: 2020-12-13

## 2020-12-09 RX ORDER — ISOSORBIDE DINITRATE AND HYDRALAZINE HYDROCHLORIDE 37.5; 2 MG/1; MG/1
1 TABLET ORAL 3 TIMES DAILY
Status: DISCONTINUED | OUTPATIENT
Start: 2020-12-09 | End: 2020-12-14

## 2020-12-09 RX ADMIN — ASPIRIN 81 MG CHEWABLE TABLET 81 MG: 81 TABLET CHEWABLE at 12:12

## 2020-12-09 RX ADMIN — METRONIDAZOLE 500 MG: 500 TABLET ORAL at 09:12

## 2020-12-09 RX ADMIN — VANCOMYCIN HYDROCHLORIDE 1500 MG: 1.5 INJECTION, POWDER, LYOPHILIZED, FOR SOLUTION INTRAVENOUS at 06:12

## 2020-12-09 RX ADMIN — TAMSULOSIN HYDROCHLORIDE 0.4 MG: 0.4 CAPSULE ORAL at 12:12

## 2020-12-09 RX ADMIN — HYDROCODONE BITARTRATE AND ACETAMINOPHEN 1 TABLET: 5; 325 TABLET ORAL at 04:12

## 2020-12-09 RX ADMIN — HYDRALAZINE HYDROCHLORIDE 25 MG: 25 TABLET ORAL at 04:12

## 2020-12-09 RX ADMIN — HYDRALAZINE HYDROCHLORIDE 25 MG: 25 TABLET ORAL at 12:12

## 2020-12-09 RX ADMIN — INSULIN ASPART 8 UNITS: 100 INJECTION, SOLUTION INTRAVENOUS; SUBCUTANEOUS at 08:12

## 2020-12-09 RX ADMIN — DOCUSATE SODIUM 50MG AND SENNOSIDES 8.6MG 1 TABLET: 8.6; 5 TABLET, FILM COATED ORAL at 12:12

## 2020-12-09 RX ADMIN — INSULIN DETEMIR 25 UNITS: 100 INJECTION, SOLUTION SUBCUTANEOUS at 09:12

## 2020-12-09 RX ADMIN — ZINC SULFATE 220 MG (50 MG) CAPSULE 220 MG: CAPSULE at 03:12

## 2020-12-09 RX ADMIN — PIPERACILLIN AND TAZOBACTAM 4.5 G: 4; .5 INJECTION, POWDER, LYOPHILIZED, FOR SOLUTION INTRAVENOUS; PARENTERAL at 01:12

## 2020-12-09 RX ADMIN — INSULIN ASPART 8 UNITS: 100 INJECTION, SOLUTION INTRAVENOUS; SUBCUTANEOUS at 06:12

## 2020-12-09 RX ADMIN — CEFEPIME 2 G: 1 INJECTION, POWDER, FOR SOLUTION INTRAMUSCULAR; INTRAVENOUS at 10:12

## 2020-12-09 RX ADMIN — ATORVASTATIN CALCIUM 40 MG: 20 TABLET, FILM COATED ORAL at 09:12

## 2020-12-09 RX ADMIN — ENOXAPARIN SODIUM 40 MG: 40 INJECTION SUBCUTANEOUS at 04:12

## 2020-12-09 RX ADMIN — LOSARTAN POTASSIUM 50 MG: 50 TABLET, FILM COATED ORAL at 12:12

## 2020-12-09 RX ADMIN — THERA TABS 1 TABLET: TAB at 12:12

## 2020-12-09 RX ADMIN — ACETAMINOPHEN 650 MG: 325 TABLET ORAL at 12:12

## 2020-12-09 RX ADMIN — TUBERCULIN PURIFIED PROTEIN DERIVATIVE 5 UNITS: 5 INJECTION, SOLUTION INTRADERMAL at 06:12

## 2020-12-09 RX ADMIN — ACETAMINOPHEN 650 MG: 325 TABLET ORAL at 04:12

## 2020-12-09 RX ADMIN — ATORVASTATIN CALCIUM 40 MG: 20 TABLET, FILM COATED ORAL at 12:12

## 2020-12-09 RX ADMIN — INSULIN ASPART 8 UNITS: 100 INJECTION, SOLUTION INTRAVENOUS; SUBCUTANEOUS at 12:12

## 2020-12-09 RX ADMIN — HYDRALAZINE HYDROCHLORIDE AND ISOSORBIDE DINITRATE 1 TABLET: 37.5; 2 TABLET, FILM COATED ORAL at 09:12

## 2020-12-09 RX ADMIN — PIPERACILLIN AND TAZOBACTAM 4.5 G: 4; .5 INJECTION, POWDER, LYOPHILIZED, FOR SOLUTION INTRAVENOUS; PARENTERAL at 12:12

## 2020-12-09 NOTE — ASSESSMENT & PLAN NOTE
70 year old male with chronic stasis ulcers.  US reviewed.  Waveforms are at least biphasic throughout left lower extremity.  Appears to be purely venous in nature    -Recommend elevation of bilateral extremities and compression stockings 40mmHg  -Recommend good local wound care and regular appointments with wound care outpatient.    -No urgent vascular intervention planned.

## 2020-12-09 NOTE — HPI
Saji Castañeda is a 70 y.o. male who  has a past medical history of Arthritis, Diabetes mellitus, Diabetes mellitus, type 2, Hyperlipidemia, and Osteomyelitis, chronic lymphedema with chronic bilateral lower extremity wounds who presents with worsening and nonhealing right and left lower extremity wounds.  The left is worse than right.  He follows with Podiatry at [facility].  He had previous TMA of the L foot.  He had been on antibiotics for the last 2 weeks with worsening of the wounds.  The was prompted to come to the ED for evaluation.  He denies any fever, nausea, vomiting or chills.  He admits to pain to bilateral lower extremities.  He does not wrap or elevate his legs consistently. He as not been ambulatory since he developed the heel wound on the left lower extremity.Vascular surgery is consulted for evaluation

## 2020-12-09 NOTE — CONSULTS
[facility]  Infectious Disease  Consult Note    Patient Name: Saji Castañeda  MRN: [id number]  Admission Date: 12/8/2020  Hospital Length of Stay: 1 days  Attending Physician: Emily Salcido MD  Primary Care Provider: Lalo Heath MD     Isolation Status: No active isolations       Inpatient consult to Infectious Diseases  Consult performed by: Raheem White PA-C  Consult ordered by: Emily Salcido MD        Assessment/Plan:     Osteomyelitis  Saji Castañeda is a 70 y.o. male with DMII, HLD presents to ED with worsening wounds of his bilateral lower extremities. ID consulted for osteo of left foot.     MRI tib/fib: Increased STIR signal within the osseous structures of the remaining forefoot is suspected to reflect that of artifact given motion versus surgical change.  No associated low T1 signal to suggest definitively changes of osteomyelitis    Seen by podiatry, underwent bone biopsy today. On vancomycin and zosyn. Pt remained afebrile, stable. No leukocytosis.     Recommendations  1. Continue vancomycin. Trough goal 15-20. Discontinue zosyn and started cefepime and metronidazole  2. Will adjust abx accordingly  3. Vascular evaluation pending  4. Anticipate long term abx therapy for osteomyelitis  4. ID will follow              Thank you for your consult. I will follow-up with patient. Please contact us if you have any additional questions.    Raheem White PA-C  Infectious Disease  [facility]    Subjective:     Principal Problem: <principal problem not specified>    HPI: Saji Castañeda is a 70 y.o. male who  has a past medical history of Arthritis, Diabetes mellitus, Diabetes mellitus, type 2, Hyperlipidemia, and Osteomyelitis.     Patient admitted for worsening and nonhealing right and left lower extremity wounds.  The left is worse than right.  He follows with Podiatry at [facility].  He had been on antibiotics for the last 2 weeks with worsening of the wounds.   The was prompted to come to the ED for evaluation.  He denies any fever, nausea, vomiting or chills.  He admits to pain to bilateral lower extremities that is worse at night before going to sleep.  He reports the pain is a sharp and shooting pain.  He reports history of ambulation but has not been ambulatory since he developed the heel wound on the left lower extremity.    Per podiatry, Patient has bilateral lower extremity venous stasis ulcerations.  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  The anterior wound has exposed bone with likely osteomyelitis.       Past Medical History:   Diagnosis Date    Arthritis     legs    Diabetes mellitus     Diabetes mellitus, type 2     Hyperlipidemia     Osteomyelitis        Past Surgical History:   Procedure Laterality Date    FOOT AMPUTATION  October 2010    left high midfoot amputation       Review of patient's allergies indicates:  No Known Allergies    Medications:  (Not in a hospital admission)    Antibiotics (From admission, onward)    Start     Stop Route Frequency Ordered    12/09/20 1445  ceFEPIme injection 2 g      -- IV Every 8 hours (non-standard times) 12/09/20 1334    12/09/20 1445  metroNIDAZOLE tablet 500 mg      -- Oral Every 8 hours 12/09/20 1334    12/09/20 0600  vancomycin 1.5 g in dextrose 5 % 250 mL IVPB (ready to mix)      -- IV Every 12 hours (non-standard times) 12/08/20 1635    12/08/20 1723  vancomycin - pharmacy to dose  (vancomycin IVPB)      -- IV pharmacy to manage frequency 12/08/20 1624        Antifungals (From admission, onward)    None        Antivirals (From admission, onward)    None           Immunization History   Administered Date(s) Administered    Pneumococcal Conjugate - 13 Valent 10/16/2018       Family History     Problem Relation (Age of Onset)    Cancer Brother    Diabetes Mother, Sister    Heart disease Mother    Stroke Sister        Social History      Socioeconomic History    Marital status: Single     Spouse name: Not on file    Number of children: Not on file    Years of education: Not on file    Highest education level: Not on file   Occupational History    Not on file   Social Needs    Financial resource strain: Not on file    Food insecurity     Worry: Not on file     Inability: Not on file    Transportation needs     Medical: Not on file     Non-medical: Not on file   Tobacco Use    Smoking status: Never Smoker    Smokeless tobacco: Never Used   Substance and Sexual Activity    Alcohol use: No     Comment: occassional    Drug use: No    Sexual activity: Not Currently   Lifestyle    Physical activity     Days per week: Not on file     Minutes per session: Not on file    Stress: Not on file   Relationships    Social connections     Talks on phone: Not on file     Gets together: Not on file     Attends [employer] service: Not on file     Active member of club or organization: Not on file     Attends meetings of clubs or organizations: Not on file     Relationship status: Not on file   Other Topics Concern    Not on file   Social History Narrative    Not currently working; lives with family     Review of Systems   Constitutional: Negative for activity change, appetite change, chills, diaphoresis, fatigue and fever.   Respiratory: Negative for cough and shortness of breath.    Cardiovascular: Negative for chest pain, palpitations and leg swelling.   Gastrointestinal: Negative for abdominal pain, diarrhea, nausea and vomiting.   Genitourinary: Negative for dysuria.   Musculoskeletal: Negative for back pain.   Skin: Positive for wound. Negative for color change and pallor.   Neurological: Negative for dizziness and headaches.   All other systems reviewed and are negative.    Objective:     Vital Signs (Most Recent):  Temp: 99.1 °F (37.3 °C) (12/09/20 1235)  Pulse: (!) 46 (12/09/20 0610)  Resp: 16 (12/09/20 0610)  BP: (!) 206/84 (12/09/20  1235)  SpO2: 96 % (12/09/20 1235) Vital Signs (24h Range):  Temp:  [98.5 °F (36.9 °C)-99.7 °F (37.6 °C)] 99.1 °F (37.3 °C)  Pulse:  [44-70] 46  Resp:  [16] 16  SpO2:  [96 %-100 %] 96 %  BP: (176-233)/() 206/84     Weight: 128.8 kg (284 lb)  Body mass index is 41.94 kg/m².    Estimated Creatinine Clearance: 75 mL/min (based on SCr of 1.2 mg/dL).    Physical Exam  Vitals signs and nursing note reviewed.   Constitutional:       General: He is not in acute distress.     Appearance: Normal appearance. He is not ill-appearing, toxic-appearing or diaphoretic.   HENT:      Head: Normocephalic.      Nose: Nose normal.   Eyes:      Pupils: Pupils are equal, round, and reactive to light.   Cardiovascular:      Rate and Rhythm: Normal rate and regular rhythm.   Pulmonary:      Effort: Pulmonary effort is normal. No respiratory distress.      Breath sounds: Normal breath sounds. No stridor.   Abdominal:      General: Abdomen is flat. There is no distension.      Palpations: Abdomen is soft. There is no mass.      Tenderness: There is no abdominal tenderness.      Hernia: No hernia is present.   Musculoskeletal:         General: Tenderness present. No swelling, deformity or signs of injury.   Skin:     General: Skin is warm and dry.      Coloration: Skin is not jaundiced or pale.      Comments: BLE wrapped at this time. Refer to media   Neurological:      General: No focal deficit present.      Mental Status: He is alert and oriented to person, place, and time.   Psychiatric:         Mood and Affect: Mood normal.         Significant Labs: All pertinent labs within the past 24 hours have been reviewed.    Significant Imaging: I have reviewed all pertinent imaging results/findings within the past 24 hours.

## 2020-12-09 NOTE — SUBJECTIVE & OBJECTIVE
"Medications Prior to Admission   Medication Sig Dispense Refill Last Dose    metformin (GLUCOPHAGE) 500 MG tablet Take 2 tablets (1,000 mg total) by mouth 2 (two) times daily with meals. 360 tablet 3 12/8/2020    ammonium lactate (LAC-HYDRIN) 12 % lotion        aspirin (ECOTRIN) 81 MG EC tablet Take 81 mg by mouth once daily.       atorvastatin (LIPITOR) 20 MG tablet TK 1 T PO QD  1     atorvastatin (LIPITOR) 40 MG tablet Take 40 mg by mouth every evening.        BD ULTRA-FINE ADITYA PEN NEEDLE 32 gauge x 5/32" Ndle USE FOUR TIMES DAILY WITH MEALS AND NIGHTLY 100 each 0     BESIVANCE 0.6 % DrpS INSTILL 1 DROP INTO THE OS TID  2     blood sugar diagnostic (CONTOUR TEST STRIPS) Strp Inject 1 strip into the skin 2 (two) times daily before meals. 100 strip 6     DUREZOL 0.05 % Drop ophthalmic solution INSTILL 1 DROP INTO THE OS QID  2     econazole nitrate 1 % cream APPLY EXTERNALLY TO THE AFFECTED AREA ONCE DAILY 85 g 2     ILEVRO 0.3 % DrpS INSTILL 1 DROP INTO THE OS QD  2     JANUVIA 100 mg Tab TAKE 1 TABLET BY MOUTH ONCE DAILY 90 tablet 1     LEVEMIR FLEXTOUCH U-100 INSULN 100 unit/mL (3 mL) InPn pen INJECT 25 UNITS UNDER THE SKIN TWICE DAILY 15 mL 0     losartan (COZAAR) 50 MG tablet        MICROLET LANCET Misc   0     sitagliptin (JANUVIA) 100 MG Tab Take 1 tablet (100 mg total) by mouth once daily. 90 tablet 3     tamsulosin (FLOMAX) 0.4 mg Cp24 Take 0.4 mg by mouth once daily.       triamcinolone acetonide 0.1% (KENALOG) 0.1 % cream APPLY EXTERNALLY TO THE AFFECTED AREA TWICE DAILY AS DIRECTED 454 g 0     TRUE METRIX GLUCOSE TEST STRIP Strp USE TO TEST TWICE DAILY 100 strip 6        Review of patient's allergies indicates:  No Known Allergies    Past Medical History:   Diagnosis Date    Arthritis     legs    Diabetes mellitus     Diabetes mellitus, type 2     Hyperlipidemia     Osteomyelitis      Past Surgical History:   Procedure Laterality Date    FOOT AMPUTATION  October 2010    left " high midfoot amputation     Family History     Problem Relation (Age of Onset)    Cancer Brother    Diabetes Mother, Sister    Heart disease Mother    Stroke Sister        Tobacco Use    Smoking status: Never Smoker    Smokeless tobacco: Never Used   Substance and Sexual Activity    Alcohol use: No     Comment: occassional    Drug use: No    Sexual activity: Not Currently     Review of Systems   Constitutional: Negative for activity change, appetite change, chills, diaphoresis, fatigue and fever.   Respiratory: Negative for cough and shortness of breath.    Cardiovascular: Negative for chest pain, palpitations and leg swelling.   Gastrointestinal: Negative for abdominal pain, diarrhea, nausea and vomiting.   Genitourinary: Negative for dysuria.   Musculoskeletal: Negative for back pain.   Skin: Positive for wound. Negative for color change and pallor.   Neurological: Negative for dizziness and headaches.   All other systems reviewed and are negative.    Objective:     Vital Signs (Most Recent):  Temp: 98 °F (36.7 °C) (12/09/20 1553)  Pulse: 63 (12/09/20 1553)  Resp: 18 (12/09/20 1553)  BP: (!) 183/73 (12/09/20 1553)  SpO2: 96 % (12/09/20 1553) Vital Signs (24h Range):  Temp:  [98 °F (36.7 °C)-99.7 °F (37.6 °C)] 98 °F (36.7 °C)  Pulse:  [44-70] 63  Resp:  [16-18] 18  SpO2:  [96 %-100 %] 96 %  BP: (176-233)/() 183/73     Weight: 128.8 kg (284 lb)  Body mass index is 41.94 kg/m².    Physical Exam  Vitals signs and nursing note reviewed.   Constitutional:       General: He is not in acute distress.     Appearance: Normal appearance. He is not ill-appearing, toxic-appearing or diaphoretic.   HENT:      Head: Normocephalic.      Nose: Nose normal.   Eyes:      Pupils: Pupils are equal, round, and reactive to light.   Cardiovascular:      Rate and Rhythm: Normal rate and regular rhythm.      Comments: No palpable distal pulses.  Right foot appears perfused.  Left TMA stump appears perfused.  No doppler  immediately available  Pulmonary:      Effort: Pulmonary effort is normal. No respiratory distress.      Breath sounds: Normal breath sounds. No stridor.   Abdominal:      General: Abdomen is flat. There is no distension.      Palpations: Abdomen is soft. There is no mass.      Tenderness: There is no abdominal tenderness.      Hernia: No hernia is present.   Musculoskeletal:         General: Tenderness present. No swelling, deformity or signs of injury.      Comments: 1cm ulcer at distal aspect of L TMA.  Multiple ulcers scattered along bilateral lower extremities.   Skin:     General: Skin is warm and dry.      Coloration: Skin is not jaundiced or pale.      Comments: BLE with chronic skin changes including thickening and scaling consistent with chronic stasis   Neurological:      General: No focal deficit present.      Mental Status: He is alert and oriented to person, place, and time.   Psychiatric:         Mood and Affect: Mood normal.         Significant Labs:  BMP:   Recent Labs   Lab 12/09/20  0355   *      K 3.8      CO2 28   BUN 16   CREATININE 1.2   CALCIUM 8.6*     CBC:   Recent Labs   Lab 12/09/20  0355   WBC 7.90   RBC 3.70*   HGB 9.5*   HCT 31.0*      MCV 84   MCH 25.7*   MCHC 30.6*       Significant Diagnostics:  EXAMINATION:  US LOWER EXTREMITY ARTERIES BILATERAL     CLINICAL HISTORY:  PVD;  Type 2 diabetes mellitus with diabetic neuropathy, unspecified     TECHNIQUE:  Bilateral lower extremity arterial duplex ultrasound examination performed. Multiple gray scale and color doppler images were obtained in addition to waveform analysis.  Ankle-brachial indices were calculated.     COMPARISON:  Ultrasound bilateral lower extremity arteries 12/04/2019.     FINDINGS:  The peak systolic velocities on the right are as follows, in centimeters/second:     Common femoral artery: 190     Deep femoral artery: 78     Superficial femoral artery, proximal: 118     Superficial femoral  artery, mid portion: 132     Superficial femoral artery, distal: 112     Popliteal artery, proximal: 67     Popliteal artery, distal: 69     Posterior tibial artery: 32     Anterior tibial artery: 150     The peak systolic velocities on the left are as follows, in centimeters/second:     Common femoral artery: 120     Deep femoral artery: 89     Superficial femoral artery, proximal: 119     Superficial femoral artery, mid portion: 176     Superficial femoral artery, distal: 97     Popliteal artery, proximal: 130     Popliteal artery, distal: 125     Posterior tibial artery: 90     Anterior tibial artery: 123     Left arterial waveforms are monophasic beginning in the left superficial femoral artery, but without visualized stenosis or occlusion.  This is similar to exam from 12/04/2019.     Right posterior tibial artery demonstrates monophasic waveforms.     Impression:     Doubling of velocity in the right anterior tibial artery and diminished velocity within the right posterior tibial artery accompanied by abnormal waveforms suggesting significant stenoses.  Abnormal waveforms throughout the left lower extremity without significant velocity change may indicate more proximal stenosis, similar to prior study.

## 2020-12-09 NOTE — PT/OT/SLP EVAL
Occupational Therapy  Co-Evaluation with PT    Name: Saji Castañeda  MRN: [id number]  Admitting Diagnosis:  Wound Infection  Length of Stay: 1 days    Recommendations:     Discharge Recommendations: home with home health, home health OT  Discharge Equipment Recommendations:  none  Barriers to discharge:  None    Plan:     Patient to be seen 2 x/week to address the above listed problems via self-care/home management, therapeutic activities, therapeutic exercises  · Plan of Care Expires: 01/09/21  · Plan of Care Reviewed with: patient    Assessment:     Saji Castañeda is a 70 y.o. male with a medical diagnosis of wound infection.  He presents with the following performance deficits affecting function: weakness, impaired endurance, gait instability, impaired balance, decreased safety awareness, impaired functional mobilty, decreased lower extremity function, impaired skin.      Pt presenting with impaired functional mobility 2* decreased BLE function upon evaluation this date. Pt with LLE foot amputation and performs stand pivot transfers. Primarily uses wheelchair at this time 2* unable to don LLE prosthetic due to leg wounds. Pt would benefit from continued acute skilled OT services at this time to increase functional performance, and improve quality of life. OT to recommend Home Health at D/C once medically appropriate for increased functional independence and to improve patient safety before returning home.    Rehab Prognosis: Good; patient would benefit from acute skilled OT services to address these deficits and reach maximum level of function.       Subjective   Communicated with: RN prior to session.  Patient found seated in wheelchair preparing to enter bathroom with telemetry upon OT entry to room.    Chief Complaint: Wound Infection (to both lower legs, draining, finished antibiotics last week)    Patient/Family Comments/goals: To return to PLOF and be able to use his prosthetic for walking  "again    Pain/Comfort:  Pain Rating 1: 0/10  Pain Rating Post-Intervention 1: 0/10    Patients cultural, spiritual, Uatsdin conflicts given the current situation: no    Occupational Profile:  Living Environment: pt lives with sister and brother in [facility], 3 LON, B rails; reports independently going up/down steps and brings wheelchair with him; reports jacuzzi style tub  Prior Level of Function: Patient reports being Mod-I with mobility & with ADLs; reports gives self sponge baths while seated in wheelchair in bathroom.   Patient uses DME as follows: wheelchair, walker, standard, grab bar.   DME owned (not currently used): none.  Roles/Repsonsibilities:   Hand Dominance: right   Work: no.   Drive: no.   Managing Medicines/Managing Home: yes.   Hobbies: frequently propels self in wheelchair to [employer] to spend day "hanging out".  Equipment Used at Home:  wheelchair, walker, standard, grab bar    Patient reports they will have assistance from sister and brother upon discharge.      Objective:     Patient found with: telemetry   General Precautions: Standard, Cardiac fall   Orthopedic Precautions:LLE weight bearing as tolerated   Braces: N/A   Oxygen Device: Room Air  Vitals: BP (!) 206/84 (BP Location: Right arm, Patient Position: Sitting)   Pulse (!) 46   Temp 99.1 °F (37.3 °C) (Oral)   Resp 16   Ht 5' 9" (1.753 m)   Wt 128.8 kg (284 lb)   SpO2 96%   BMI 41.94 kg/m²     Cognitive and Psychosocial Function:   · AxOx4 -- Person, Place, Time and Situation   · Follows Commands/attention:follows one-step commands  · Communication:  clear/fluent  · Memory: No Deficits noted  · Safety awareness/insight to disability: impaired and wheelchair/ transfer safety   · Mood/Affect/Coping skills/emotional control: Appropriate to situation    Hearing: Intact    Vision:  Intact visual fields    Physical Exam:  Postural examination/scapula alignment:    -       No postural abnormalities identified  Skin integrity: Wound BLE     " Left UE Right UE   UE Edema absent absent   UE ROM AROM WFL AROM WFL   UE Strength 5/5 5/5    Strength grasp WFL grasp WFL   Sensation LUE INTACT:WFL RUE INTACT: WFL   Fine Motor Coordination:  LUE INTACT: WFL RUE INTACT: WFL   Gross Motor Coordination: LUE INTACT: WFL RUE INTACT:WFL     Occupational Performance:  Bed Mobility:    · Pt performed facilitated bed transfer from/to wheelchair with Supervision using stand pivot technique    Functional Mobility/Transfers:   Static Sitting in wheelchair: [place]   Dynamic Sitting in wheelchair: [place]   Patient completed Sit <> Stand Transfer from wheelchair with stand by assistance  with no additional assistive device    Static Standing Balance: SBA while supported with BUE   Dynamic Standing Balance: SBA-CGA while supported with BUE   Patient completed Bed <> WheelChair Transfer using Stand Pivot technique with contact guard assistance with hand-held assist and using rails for support   Patient completed Toilet Transfer from toilet Step Transfer technique with contact guard assistance with  hand-held assist and using grab bars and sink for support      Activities of Daily Living:  · Grooming: independence to wash hands seated at sink in wheelchair  · Upper Body Dressing: minimum assistance to don gown seated in wheelchair  · Lower Body Dressing: stand by assistance to don pants while in stance and unilaterally supporting self with UE   · Toileting: modified independence to perform pericare seated on toilet      AMPAC 6 Click ADL:  AMPAC Total Score: 21    Treatment & Education:  -Pt education on OT role and POC.  -Importance of E/OOB activity with staff assistance, emphasis on daily participation  -Multiple self-care tasks and functional mobility completed -- assistance level noted above  -Safety during functional transfer and mobility ensured  -Patient provided with education on importance of Bilateral UB/LB integration during functional tasks for  improvement in functional performance.   -Education provided/reviewed, questions answered within OT scope of practice.   -Patient demonstrates understanding and learning this date.         Co-treat with PT due to medical complexity of pt and to optimize functional performance.    Patient left seated in wheelchair with all lines intact, call button in reach and RN notified    GOALS:   Multidisciplinary Problems     Occupational Therapy Goals        Problem: Occupational Therapy Goal    Goal Priority Disciplines Outcome Interventions   Occupational Therapy Goal     OT, PT/OT Ongoing, Progressing    Description: Goals set on 12/9, with expiration date 12/23:  Patient will increase functional independence with ADLs by performing:    Bed mobility with Neptune  Grooming while seated at sink with Neptune  UB Dressing with Neptune.  LB Dressing with Neptune.  Toileting from toilet with stand pivot technique with Neptune  for hygiene and clothing management.   Functional mobility of household and community distance with Neptune and AD as needed  Pt will demonstrate understanding of education provided regarding energy conservation and task modification through teach-back method.                       History:     Past Medical History:   Diagnosis Date    Arthritis     legs    Diabetes mellitus     Diabetes mellitus, type 2     Hyperlipidemia     Osteomyelitis          Past Surgical History:   Procedure Laterality Date    FOOT AMPUTATION  October 2010    left high midfoot amputation       Time Tracking:       OT Date of Treatment: 12/09/20  OT Start Time: 1004  OT Stop Time: 1028  OT Total Time (min): 24 min  Additional staff present: PT      Billable Minutes:Evaluation 10 mins  Self Care/Home Management 14 mins      JEANNE Prince  12/9/2020

## 2020-12-09 NOTE — ASSESSMENT & PLAN NOTE
Saji Castañeda is a 70 y.o. male with DMII, HLD presents to ED with worsening wounds of his bilateral lower extremities. ID consulted for osteo of left foot.     MRI tib/fib: Increased STIR signal within the osseous structures of the remaining forefoot is suspected to reflect that of artifact given motion versus surgical change.  No associated low T1 signal to suggest definitively changes of osteomyelitis    Seen by podiatry, underwent bone biopsy today. On vancomycin and zosyn. Pt remained afebrile, stable. No leukocytosis.     Recommendations  1. Continue vancomycin. Trough goal 15-20. Discontinue zosyn and started cefepime and metronidazole  2. Will adjust abx accordingly  3. Vascular evaluation pending  4. Anticipate long term abx therapy for osteomyelitis  4. ID will follow

## 2020-12-09 NOTE — ASSESSMENT & PLAN NOTE
Patient has bilateral lower extremity venous stasis ulcerations.  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  The anterior wound has exposed bone with likely osteomyelitis.    Plan:  - MRI of the leg was reviewed with no deep infection.  - wound was debrided today in cultures were taken of both the anterior foot wound and posterior leg wound.  Details of the procedure below.  - pending culture results.  Patient will need antibiotics according to Infectious Disease recommendations.  - nursing to continue local wound care as ordered.  - offload the heels with Z flex boot  - podiatry will follow

## 2020-12-09 NOTE — H&P
"[facility]  History & Physical    SUBJECTIVE:     Chief Complaint/Reason for Admission: non healing leg wounds    History of Present Illness:  Patient is a 70 y.o. male past medical hx of DM2 insulin deendent, HLP, HTN and peripheral venouse stasis, PVD and prior left foot osteomyelitis requiring amputation presenting with 2 weeks of worsening wounds to left LE - particular issue with heel wound and anterior wound a the stump site. Bone is exposed on the anterior wound. Patient to admitted for osteomyelitis evaluation. MRI is concerning for osteomyelitis. Podiatry with bone biopsy today and redressing all wounds. Infectious disease consulted - cefepime and flagyl. U/S arterial bilateral lower extremities with PVD but no surgical intervention by vascular surgery.     PTA Medications   Medication Sig    metformin (GLUCOPHAGE) 500 MG tablet Take 2 tablets (1,000 mg total) by mouth 2 (two) times daily with meals.    ammonium lactate (LAC-HYDRIN) 12 % lotion     aspirin (ECOTRIN) 81 MG EC tablet Take 81 mg by mouth once daily.    atorvastatin (LIPITOR) 20 MG tablet TK 1 T PO QD    atorvastatin (LIPITOR) 40 MG tablet Take 40 mg by mouth every evening.     BD ULTRA-FINE ADITYA PEN NEEDLE 32 gauge x 5/32" Ndle USE FOUR TIMES DAILY WITH MEALS AND NIGHTLY    BESIVANCE 0.6 % DrpS INSTILL 1 DROP INTO THE OS TID    blood sugar diagnostic (CONTOUR TEST STRIPS) Strp Inject 1 strip into the skin 2 (two) times daily before meals.    DUREZOL 0.05 % Drop ophthalmic solution INSTILL 1 DROP INTO THE OS QID    econazole nitrate 1 % cream APPLY EXTERNALLY TO THE AFFECTED AREA ONCE DAILY    ILEVRO 0.3 % DrpS INSTILL 1 DROP INTO THE OS QD    JANUVIA 100 mg Tab TAKE 1 TABLET BY MOUTH ONCE DAILY    LEVEMIR FLEXTOUCH U-100 INSULN 100 unit/mL (3 mL) InPn pen INJECT 25 UNITS UNDER THE SKIN TWICE DAILY    losartan (COZAAR) 50 MG tablet     MICROLET LANCET Misc     sitagliptin (JANUVIA) 100 MG Tab Take 1 tablet " (100 mg total) by mouth once daily.    tamsulosin (FLOMAX) 0.4 mg Cp24 Take 0.4 mg by mouth once daily.    triamcinolone acetonide 0.1% (KENALOG) 0.1 % cream APPLY EXTERNALLY TO THE AFFECTED AREA TWICE DAILY AS DIRECTED    TRUE METRIX GLUCOSE TEST STRIP Strp USE TO TEST TWICE DAILY       Review of patient's allergies indicates:  No Known Allergies    Past Medical History:   Diagnosis Date    Arthritis     legs    Diabetes mellitus     Diabetes mellitus, type 2     Hyperlipidemia     Osteomyelitis      Past Surgical History:   Procedure Laterality Date    FOOT AMPUTATION  October 2010    left high midfoot amputation     Family History   Problem Relation Age of Onset    Diabetes Mother     Heart disease Mother     Stroke Sister     Cancer Brother     Diabetes Sister      Social History     Tobacco Use    Smoking status: Never Smoker    Smokeless tobacco: Never Used   Substance Use Topics    Alcohol use: No     Comment: occassional    Drug use: No        Review of Systems:   Constitutional: Negative for chills and fever.   Respiratory: Negative for shortness of breath.    Cardiovascular: Negative for chest pain.   Gastrointestinal: Negative for abdominal pain.   All other systems reviewed and are negative.       OBJECTIVE:     Vital Signs (Most Recent):  Temp: 98 °F (36.7 °C) (12/09/20 1553)  Pulse: 63 (12/09/20 1553)  Resp: 18 (12/09/20 1641)  BP: (!) 183/73 (12/09/20 1553)  SpO2: 96 % (12/09/20 1553)    Physical Exam:  Nursing note and vitals reviewed.  Constitutional: He is not diaphoretic. He is Obese . He does not appear ill. No distress.   HENT:   Mouth/Throat: Oropharynx is clear and moist.   Eyes: Conjunctivae are normal.   Neck: Neck supple.   Cardiovascular: Normal rate and regular rhythm.   Pulmonary/Chest: No respiratory distress.   Abdominal: Soft. He exhibits no distension. There is no abdominal tenderness.   Musculoskeletal:      Comments: L LE:  Skin is dry, scaly.  Distal stump is  black with several ulcerations noted, foul-smelling.  Skin seems warm.  Green drainage on dressing    R LE: skin hyperpigmented but no actual ulcerations, area of drainage, unable to palpate DP pulse   Neurological: He is alert and oriented to person, place, and time.   Skin: Rash noted.    Laboratory:  CBC:   Recent Labs   Lab 12/09/20  0355   WBC 7.90   RBC 3.70*   HGB 9.5*   HCT 31.0*      MCV 84   MCH 25.7*   MCHC 30.6*     CMP:   Recent Labs   Lab 12/09/20  0355   *   CALCIUM 8.6*   ALBUMIN 2.3*   PROT 6.8      K 3.8   CO2 28      BUN 16   CREATININE 1.2   ALKPHOS 70   ALT 13   AST 17   BILITOT 0.3       Diagnostic Results:  MRI: Reviewed    ASSESSMENT/PLAN:     Active Hospital Problems    Diagnosis  POA    *Osteomyelitis [M86.9]  Yes    Mobitz (type) I (Wenckebach's) atrioventricular block [I44.1]  Yes    Type 2 diabetes, uncontrolled, with neuropathy [E11.40, E11.65]  Yes    Diabetic neuropathy [E11.40]  Yes    Wound, open, foot with complication [S91.309A]  Yes    Edema of leg [R60.0]  Yes     Chronic      Resolved Hospital Problems   No resolved problems to display.       Plan:    1. Left LE wounds concern for osteomyelitis  - Appreciate podiatry assessment - bone biopsy completed at bedside along with other wound cultures. Dressing and wound care completed today.   - continue cefepime and flagyl per ID recs.   - no indication for vascular surgery at this interval  - most likely will need long term placement for ABX as poor home candidate.     2. DM2  - home regimen lantus 68 U QPM - does not think he takes meal time insulin   - remains on metfomin at home but has stopped januvia.   - inpatient regimen   Levemir 25 U QPM   Novolog 8 U TID  - diabetic diet  - HgA1c pending    3. Hypertension  - home regimen loasartan  - Remains hypertensive in hospital  - Patient with Heart block/bradycardia and LE edema limiting options  - will trial bidil     4. HLP on lipitor 40 mg  daily    5. PVD  - see vascular surgery assesssment  - continue aspirin and statin    6. debiliy  - patient does live independently but with need for IV abx and wound care needs may benefit from SNF    DVT ppx: lovenox  Discharge: pending final ABX recs -SNF may be best choice    Emily martinez

## 2020-12-09 NOTE — CONSULTS
"[facility]  Vascular Surgery  Consult Note    Inpatient consult to Vascular Surgery  Consult performed by: Zane Cameron MD  Consult ordered by: Emily Salcido MD        Subjective:     Chief Complaint/Reason for Admission: Bilateral non-healing leg wounds    History of Present Illness: Saji Castañeda is a 70 y.o. male who  has a past medical history of Arthritis, Diabetes mellitus, Diabetes mellitus, type 2, Hyperlipidemia, and Osteomyelitis, chronic lymphedema with chronic bilateral lower extremity wounds who presents with worsening and nonhealing right and left lower extremity wounds.  The left is worse than right.  He follows with Podiatry at [facility].  He had previous TMA of the L foot.  He had been on antibiotics for the last 2 weeks with worsening of the wounds.  The was prompted to come to the ED for evaluation.  He denies any fever, nausea, vomiting or chills.  He admits to pain to bilateral lower extremities.  He does not wrap or elevate his legs consistently. He as not been ambulatory since he developed the heel wound on the left lower extremity.Vascular surgery is consulted for evaluation    Medications Prior to Admission   Medication Sig Dispense Refill Last Dose    metformin (GLUCOPHAGE) 500 MG tablet Take 2 tablets (1,000 mg total) by mouth 2 (two) times daily with meals. 360 tablet 3 12/8/2020    ammonium lactate (LAC-HYDRIN) 12 % lotion        aspirin (ECOTRIN) 81 MG EC tablet Take 81 mg by mouth once daily.       atorvastatin (LIPITOR) 20 MG tablet TK 1 T PO QD  1     atorvastatin (LIPITOR) 40 MG tablet Take 40 mg by mouth every evening.        BD ULTRA-FINE ADITYA PEN NEEDLE 32 gauge x 5/32" Ndle USE FOUR TIMES DAILY WITH MEALS AND NIGHTLY 100 each 0     BESIVANCE 0.6 % DrpS INSTILL 1 DROP INTO THE OS TID  2     blood sugar diagnostic (CONTOUR TEST STRIPS) Strp Inject 1 strip into the skin 2 (two) times daily before meals. 100 strip 6     DUREZOL 0.05 % Drop " ophthalmic solution INSTILL 1 DROP INTO THE OS QID  2     econazole nitrate 1 % cream APPLY EXTERNALLY TO THE AFFECTED AREA ONCE DAILY 85 g 2     ILEVRO 0.3 % DrpS INSTILL 1 DROP INTO THE OS QD  2     JANUVIA 100 mg Tab TAKE 1 TABLET BY MOUTH ONCE DAILY 90 tablet 1     LEVEMIR FLEXTOUCH U-100 INSULN 100 unit/mL (3 mL) InPn pen INJECT 25 UNITS UNDER THE SKIN TWICE DAILY 15 mL 0     losartan (COZAAR) 50 MG tablet        MICROLET LANCET Misc   0     sitagliptin (JANUVIA) 100 MG Tab Take 1 tablet (100 mg total) by mouth once daily. 90 tablet 3     tamsulosin (FLOMAX) 0.4 mg Cp24 Take 0.4 mg by mouth once daily.       triamcinolone acetonide 0.1% (KENALOG) 0.1 % cream APPLY EXTERNALLY TO THE AFFECTED AREA TWICE DAILY AS DIRECTED 454 g 0     TRUE METRIX GLUCOSE TEST STRIP Strp USE TO TEST TWICE DAILY 100 strip 6        Review of patient's allergies indicates:  No Known Allergies    Past Medical History:   Diagnosis Date    Arthritis     legs    Diabetes mellitus     Diabetes mellitus, type 2     Hyperlipidemia     Osteomyelitis      Past Surgical History:   Procedure Laterality Date    FOOT AMPUTATION  October 2010    left high midfoot amputation     Family History     Problem Relation (Age of Onset)    Cancer Brother    Diabetes Mother, Sister    Heart disease Mother    Stroke Sister        Tobacco Use    Smoking status: Never Smoker    Smokeless tobacco: Never Used   Substance and Sexual Activity    Alcohol use: No     Comment: occassional    Drug use: No    Sexual activity: Not Currently     Review of Systems   Constitutional: Negative for activity change, appetite change, chills, diaphoresis, fatigue and fever.   Respiratory: Negative for cough and shortness of breath.    Cardiovascular: Negative for chest pain, palpitations and leg swelling.   Gastrointestinal: Negative for abdominal pain, diarrhea, nausea and vomiting.   Genitourinary: Negative for dysuria.   Musculoskeletal: Negative for back  pain.   Skin: Positive for wound. Negative for color change and pallor.   Neurological: Negative for dizziness and headaches.   All other systems reviewed and are negative.    Objective:     Vital Signs (Most Recent):  Temp: 98 °F (36.7 °C) (12/09/20 1553)  Pulse: 63 (12/09/20 1553)  Resp: 18 (12/09/20 1553)  BP: (!) 183/73 (12/09/20 1553)  SpO2: 96 % (12/09/20 1553) Vital Signs (24h Range):  Temp:  [98 °F (36.7 °C)-99.7 °F (37.6 °C)] 98 °F (36.7 °C)  Pulse:  [44-70] 63  Resp:  [16-18] 18  SpO2:  [96 %-100 %] 96 %  BP: (176-233)/() 183/73     Weight: 128.8 kg (284 lb)  Body mass index is 41.94 kg/m².    Physical Exam  Vitals signs and nursing note reviewed.   Constitutional:       General: He is not in acute distress.     Appearance: Normal appearance. He is not ill-appearing, toxic-appearing or diaphoretic.   HENT:      Head: Normocephalic.      Nose: Nose normal.   Eyes:      Pupils: Pupils are equal, round, and reactive to light.   Cardiovascular:      Rate and Rhythm: Normal rate and regular rhythm.      Comments: No palpable distal pulses.  Right foot appears perfused.  Left TMA stump appears perfused.  No doppler immediately available  Pulmonary:      Effort: Pulmonary effort is normal. No respiratory distress.      Breath sounds: Normal breath sounds. No stridor.   Abdominal:      General: Abdomen is flat. There is no distension.      Palpations: Abdomen is soft. There is no mass.      Tenderness: There is no abdominal tenderness.      Hernia: No hernia is present.   Musculoskeletal:         General: Tenderness present. No swelling, deformity or signs of injury.      Comments: 1cm ulcer at distal aspect of L TMA.  Multiple ulcers scattered along bilateral lower extremities.   Skin:     General: Skin is warm and dry.      Coloration: Skin is not jaundiced or pale.      Comments: BLE with chronic skin changes including thickening and scaling consistent with chronic stasis   Neurological:      General: No  focal deficit present.      Mental Status: He is alert and oriented to person, place, and time.   Psychiatric:         Mood and Affect: Mood normal.         Significant Labs:  BMP:   Recent Labs   Lab 12/09/20  0355   *      K 3.8      CO2 28   BUN 16   CREATININE 1.2   CALCIUM 8.6*     CBC:   Recent Labs   Lab 12/09/20  0355   WBC 7.90   RBC 3.70*   HGB 9.5*   HCT 31.0*      MCV 84   MCH 25.7*   MCHC 30.6*       Significant Diagnostics:  EXAMINATION:  US LOWER EXTREMITY ARTERIES BILATERAL     CLINICAL HISTORY:  PVD;  Type 2 diabetes mellitus with diabetic neuropathy, unspecified     TECHNIQUE:  Bilateral lower extremity arterial duplex ultrasound examination performed. Multiple gray scale and color doppler images were obtained in addition to waveform analysis.  Ankle-brachial indices were calculated.     COMPARISON:  Ultrasound bilateral lower extremity arteries 12/04/2019.     FINDINGS:  The peak systolic velocities on the right are as follows, in centimeters/second:     Common femoral artery: 190     Deep femoral artery: 78     Superficial femoral artery, proximal: 118     Superficial femoral artery, mid portion: 132     Superficial femoral artery, distal: 112     Popliteal artery, proximal: 67     Popliteal artery, distal: 69     Posterior tibial artery: 32     Anterior tibial artery: 150     The peak systolic velocities on the left are as follows, in centimeters/second:     Common femoral artery: 120     Deep femoral artery: 89     Superficial femoral artery, proximal: 119     Superficial femoral artery, mid portion: 176     Superficial femoral artery, distal: 97     Popliteal artery, proximal: 130     Popliteal artery, distal: 125     Posterior tibial artery: 90     Anterior tibial artery: 123     Left arterial waveforms are monophasic beginning in the left superficial femoral artery, but without visualized stenosis or occlusion.  This is similar to exam from 12/04/2019.     Right  posterior tibial artery demonstrates monophasic waveforms.     Impression:     Doubling of velocity in the right anterior tibial artery and diminished velocity within the right posterior tibial artery accompanied by abnormal waveforms suggesting significant stenoses.  Abnormal waveforms throughout the left lower extremity without significant velocity change may indicate more proximal stenosis, similar to prior study.       Assessment/Plan:     Edema of leg  70 year old male with chronic stasis ulcers.  US reviewed.  Waveforms are at least biphasic throughout left lower extremity.  Appears to be purely venous in nature    -Recommend elevation of bilateral extremities and compression stockings 40mmHg  -Recommend good local wound care and regular appointments with wound care outpatient.    -No urgent vascular intervention planned.        Thank you for your consult. I will follow-up with patient. Please contact us if you have any additional questions.    Zane Cameron MD  Vascular Surgery  [facility]

## 2020-12-09 NOTE — HPI
Saji Castañeda is a 70 y.o. male who  has a past medical history of Arthritis, Diabetes mellitus, Diabetes mellitus, type 2, Hyperlipidemia, and Osteomyelitis.     Patient admitted for worsening and nonhealing right and left lower extremity wounds.  The left is worse than right.  He follows with Podiatry at [facility].  He had been on antibiotics for the last 2 weeks with worsening of the wounds.  The was prompted to come to the ED for evaluation.  He denies any fever, nausea, vomiting or chills.  He admits to pain to bilateral lower extremities that is worse at night before going to sleep.  He reports the pain is a sharp and shooting pain.  He reports history of ambulation but has not been ambulatory since he developed the heel wound on the left lower extremity.    Per podiatry, Patient has bilateral lower extremity venous stasis ulcerations.  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  The anterior wound has exposed bone with likely osteomyelitis.

## 2020-12-09 NOTE — SUBJECTIVE & OBJECTIVE
Past Medical History:   Diagnosis Date    Arthritis     legs    Diabetes mellitus     Diabetes mellitus, type 2     Hyperlipidemia     Osteomyelitis        Past Surgical History:   Procedure Laterality Date    FOOT AMPUTATION  October 2010    left high midfoot amputation       Review of patient's allergies indicates:  No Known Allergies    Medications:  (Not in a hospital admission)    Antibiotics (From admission, onward)    Start     Stop Route Frequency Ordered    12/09/20 1445  ceFEPIme injection 2 g      -- IV Every 8 hours (non-standard times) 12/09/20 1334    12/09/20 1445  metroNIDAZOLE tablet 500 mg      -- Oral Every 8 hours 12/09/20 1334    12/09/20 0600  vancomycin 1.5 g in dextrose 5 % 250 mL IVPB (ready to mix)      -- IV Every 12 hours (non-standard times) 12/08/20 1635    12/08/20 1723  vancomycin - pharmacy to dose  (vancomycin IVPB)      -- IV pharmacy to manage frequency 12/08/20 1624        Antifungals (From admission, onward)    None        Antivirals (From admission, onward)    None           Immunization History   Administered Date(s) Administered    Pneumococcal Conjugate - 13 Valent 10/16/2018       Family History     Problem Relation (Age of Onset)    Cancer Brother    Diabetes Mother, Sister    Heart disease Mother    Stroke Sister        Social History     Socioeconomic History    Marital status: Single     Spouse name: Not on file    Number of children: Not on file    Years of education: Not on file    Highest education level: Not on file   Occupational History    Not on file   Social Needs    Financial resource strain: Not on file    Food insecurity     Worry: Not on file     Inability: Not on file    Transportation needs     Medical: Not on file     Non-medical: Not on file   Tobacco Use    Smoking status: Never Smoker    Smokeless tobacco: Never Used   Substance and Sexual Activity    Alcohol use: No     Comment: occassional    Drug use: No    Sexual activity: Not  Currently   Lifestyle    Physical activity     Days per week: Not on file     Minutes per session: Not on file    Stress: Not on file   Relationships    Social connections     Talks on phone: Not on file     Gets together: Not on file     Attends Gnosticist service: Not on file     Active member of club or organization: Not on file     Attends meetings of clubs or organizations: Not on file     Relationship status: Not on file   Other Topics Concern    Not on file   Social History Narrative    Not currently working; lives with family     Review of Systems   Constitutional: Negative for activity change, appetite change, chills, diaphoresis, fatigue and fever.   Respiratory: Negative for cough and shortness of breath.    Cardiovascular: Negative for chest pain, palpitations and leg swelling.   Gastrointestinal: Negative for abdominal pain, diarrhea, nausea and vomiting.   Genitourinary: Negative for dysuria.   Musculoskeletal: Negative for back pain.   Skin: Positive for wound. Negative for color change and pallor.   Neurological: Negative for dizziness and headaches.   All other systems reviewed and are negative.    Objective:     Vital Signs (Most Recent):  Temp: 99.1 °F (37.3 °C) (12/09/20 1235)  Pulse: (!) 46 (12/09/20 0610)  Resp: 16 (12/09/20 0610)  BP: (!) 206/84 (12/09/20 1235)  SpO2: 96 % (12/09/20 1235) Vital Signs (24h Range):  Temp:  [98.5 °F (36.9 °C)-99.7 °F (37.6 °C)] 99.1 °F (37.3 °C)  Pulse:  [44-70] 46  Resp:  [16] 16  SpO2:  [96 %-100 %] 96 %  BP: (176-233)/() 206/84     Weight: 128.8 kg (284 lb)  Body mass index is 41.94 kg/m².    Estimated Creatinine Clearance: 75 mL/min (based on SCr of 1.2 mg/dL).    Physical Exam  Vitals signs and nursing note reviewed.   Constitutional:       General: He is not in acute distress.     Appearance: Normal appearance. He is not ill-appearing, toxic-appearing or diaphoretic.   HENT:      Head: Normocephalic.      Nose: Nose normal.   Eyes:      Pupils:  Pupils are equal, round, and reactive to light.   Cardiovascular:      Rate and Rhythm: Normal rate and regular rhythm.   Pulmonary:      Effort: Pulmonary effort is normal. No respiratory distress.      Breath sounds: Normal breath sounds. No stridor.   Abdominal:      General: Abdomen is flat. There is no distension.      Palpations: Abdomen is soft. There is no mass.      Tenderness: There is no abdominal tenderness.      Hernia: No hernia is present.   Musculoskeletal:         General: Tenderness present. No swelling, deformity or signs of injury.   Skin:     General: Skin is warm and dry.      Coloration: Skin is not jaundiced or pale.      Comments: BLE wrapped at this time. Refer to media   Neurological:      General: No focal deficit present.      Mental Status: He is alert and oriented to person, place, and time.   Psychiatric:         Mood and Affect: Mood normal.         Significant Labs: All pertinent labs within the past 24 hours have been reviewed.    Significant Imaging: I have reviewed all pertinent imaging results/findings within the past 24 hours.

## 2020-12-09 NOTE — PLAN OF CARE
Saji Castañeda is a 70 y.o. male admitted to [facility] on 2020 for osteomyelitis. Saji Castañeda tolerated evaluation fair today. He presents with bilateral venous stasis ulcerations over BLE (worse on L than R). 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 States he has been previously able to ambulate without device using special shoe to L foot but since wounds have worsened, he has been primarily using wheelchair for mobility at home. Today he demonstrates ability to stand from wheelchair and pivot to/from toilet with supervision. Performed similar transfer from wheelchair to/from bed with supervision as well, no assistive device used. He does bear minimal weight onto LLE (residual Charcot amputation) but primarily relies on RLE for transfers/standing. He uses UE and LE to propel his own wheelchair within the room today. He feels if his wounds improved that he would be able to bear more weight onto his LLE for standing and ambulation. I spoke with Dr. Salcido and Dr. White (podiatry) after session, podiatry adding padding to L residual foot and ok with proceeding with weightbearing onto the L residual limb. Discussed PT role, POC, goals and recommendations (Home with HH PT/OT as he does have potential to ambulate using a walker, offload LLE as needed; no current DME needs as he has walker, wheelchair in place at home) with patient; verbalized understanding. Saji Castañeda would benefit from acute PT services to promote mobility during this admission and improve return to PLOF.    Problem: Physical Therapy Goal  Goal: Physical Therapy Goal  Description: Goals to be met by: 20     Patient will increase functional independence with mobility by performin. Supine to sit with Modified [place] - Not met  2. Sit to stand transfer with Supervision using RW - Not met  3. Gait  x 20 feet with Contact Guard Assistance using Rolling Walker - Not met  4.  Wheelchair propulsion x 100 feet with Supervision using bilateral upper and lower extremities - Not met  5. Stand for 5 minutes with Stand-by Assistance using Rolling Walker or UE support surface as needed - Not met  Outcome: Ongoing, Progressing    Gurmeet Prado, PT  12/9/2020

## 2020-12-09 NOTE — PROGRESS NOTES
[facility]  Podiatry  Progress Note    Patient Name: Saji Castañeda  MRN: [id number]  Admission Date: 12/8/2020  Hospital Length of Stay: 1 days  Attending Physician: Emily Salcido MD  Primary Care Provider: Lalo Heath MD   Interval history:  No acute events overnight.  Patient was resting comfortably in his wheelchair upon examination.  His pants are wet and soiled.    Scheduled Meds:   aspirin  81 mg Oral Daily    atorvastatin  40 mg Oral QHS    ceFEPime (MAXIPIME) IVPB  2 g Intravenous Q8H    enoxaparin  40 mg Subcutaneous Q24H    insulin aspart U-100  8 Units Subcutaneous TIDWM    insulin detemir U-100  25 Units Subcutaneous QHS    losartan  50 mg Oral Daily    metroNIDAZOLE  500 mg Oral Q8H    multivitamin  1 tablet Oral Daily    senna-docusate 8.6-50 mg  1 tablet Oral Daily    tamsulosin  0.4 mg Oral Daily    vancomycin (VANCOCIN) IVPB  1,500 mg Intravenous Q12H    zinc sulfate  220 mg Oral Daily     Continuous Infusions:  PRN Meds:acetaminophen, bisacodyL, dextrose 50%, dextrose 50%, glucagon (human recombinant), glucose, glucose, hydrALAZINE, HYDROcodone-acetaminophen, insulin aspart U-100, melatonin, morphine, ondansetron, sodium chloride 0.9%, Pharmacy to dose Vancomycin consult **AND** vancomycin - pharmacy to dose    Review of patient's allergies indicates:  No Known Allergies     Past Medical History:   Diagnosis Date    Arthritis     legs    Diabetes mellitus     Diabetes mellitus, type 2     Hyperlipidemia     Osteomyelitis      Past Surgical History:   Procedure Laterality Date    FOOT AMPUTATION  October 2010    left high midfoot amputation       Family History     Problem Relation (Age of Onset)    Cancer Brother    Diabetes Mother, Sister    Heart disease Mother    Stroke Sister        Tobacco Use    Smoking status: Never Smoker    Smokeless tobacco: Never Used   Substance and Sexual Activity    Alcohol use: No     Comment: occassional    Drug use:  No    Sexual activity: Not Currently     Review of Systems   Constitutional: Negative for chills and fever.   Cardiovascular: Positive for leg swelling.   Gastrointestinal: Negative for nausea and vomiting.   Musculoskeletal: Positive for gait problem.   Skin: Positive for color change and wound.     Objective:     Vital Signs (Most Recent):  Temp: 99.1 °F (37.3 °C) (12/09/20 1235)  Pulse: (!) 46 (12/09/20 0610)  Resp: 16 (12/09/20 0610)  BP: (!) 206/84 (12/09/20 1235)  SpO2: 96 % (12/09/20 1235) Vital Signs (24h Range):  Temp:  [98.5 °F (36.9 °C)-99.7 °F (37.6 °C)] 99.1 °F (37.3 °C)  Pulse:  [44-70] 46  Resp:  [16] 16  SpO2:  [96 %-100 %] 96 %  BP: (176-233)/() 206/84     Weight: 128.8 kg (284 lb)  Body mass index is 41.94 kg/m².    Foot Exam    Right Foot/Ankle     Inspection and Palpation  Ecchymosis: none  Tenderness: (Tenderness over the superficial peroneal nerve)  Swelling: (Diffuse lower extremity edema)  Skin Exam: drainage, dry skin, abnormal color and ulcer;     Neurovascular  Dorsalis pedis: 1+  Posterior tibial: 1+  Saphenous nerve sensation: diminished  Tibial nerve sensation: diminished  Superficial peroneal nerve sensation: diminished  Deep peroneal nerve sensation: diminished  Sural nerve sensation: diminished      Left Foot/Ankle      Inspection and Palpation  Ecchymosis: none  Tenderness: (Tenderness over superficial peroneal nerve)  Swelling: (Diffuse lower extremity edema)  Skin Exam: drainage, dry skin, abnormal color and ulcer;     Neurovascular  Dorsalis pedis: 1+  Posterior tibial: 1+  Saphenous nerve sensation: diminished  Tibial nerve sensation: diminished  Superficial peroneal nerve sensation: diminished  Deep peroneal nerve sensation: diminished  Sural nerve sensation: diminished            Laboratory:  CBC:   Recent Labs   Lab 12/09/20  0355   WBC 7.90   RBC 3.70*   HGB 9.5*   HCT 31.0*      MCV 84   MCH 25.7*   MCHC 30.6*     CMP:   Recent Labs   Lab 12/09/20  0355   GLU  117*   CALCIUM 8.6*   ALBUMIN 2.3*   PROT 6.8      K 3.8   CO2 28      BUN 16   CREATININE 1.2   ALKPHOS 70   ALT 13   AST 17   BILITOT 0.3     CRP:   Recent Labs   Lab 12/08/20  1326   CRP 31.4*     ESR:   Recent Labs   Lab 12/08/20  1326   SEDRATE 80*       Diagnostic Results:  I have reviewed all pertinent imaging results/findings within the past 24 hours.  Imaging Results          US Lower Extremity Arteries Bilateral (Final result)  Result time 12/09/20 09:51:55   Procedure changed from US Lower Extrem Arteries Bilat with MARIO (xpd)     Final result by Sukumar Krause MD (12/09/20 09:51:55)                 Impression:      Doubling of velocity in the right anterior tibial artery and diminished velocity within the right posterior tibial artery accompanied by abnormal waveforms suggesting significant stenoses.  Abnormal waveforms throughout the left lower extremity without significant velocity change may indicate more proximal stenosis, similar to prior study.    Electronically signed by resident: Luly Narvaez  Date:    12/09/2020  Time:    09:30    Electronically signed by: Sukumar Krause MD  Date:    12/09/2020  Time:    09:51             Narrative:    EXAMINATION:  US LOWER EXTREMITY ARTERIES BILATERAL    CLINICAL HISTORY:  PVD;  Type 2 diabetes mellitus with diabetic neuropathy, unspecified    TECHNIQUE:  Bilateral lower extremity arterial duplex ultrasound examination performed. Multiple gray scale and color doppler images were obtained in addition to waveform analysis.  Ankle-brachial indices were calculated.    COMPARISON:  Ultrasound bilateral lower extremity arteries 12/04/2019.    FINDINGS:  The peak systolic velocities on the right are as follows, in centimeters/second:    Common femoral artery: 190    Deep femoral artery: 78    Superficial femoral artery, proximal: 118    Superficial femoral artery, mid portion: 132    Superficial femoral artery, distal: 112    Popliteal artery, proximal:  67    Popliteal artery, distal: 69    Posterior tibial artery: 32    Anterior tibial artery: 150    The peak systolic velocities on the left are as follows, in centimeters/second:    Common femoral artery: 120    Deep femoral artery: 89    Superficial femoral artery, proximal: 119    Superficial femoral artery, mid portion: 176    Superficial femoral artery, distal: 97    Popliteal artery, proximal: 130    Popliteal artery, distal: 125    Posterior tibial artery: 90    Anterior tibial artery: 123    Left arterial waveforms are monophasic beginning in the left superficial femoral artery, but without visualized stenosis or occlusion.  This is similar to exam from 12/04/2019.    Right posterior tibial artery demonstrates monophasic waveforms.                               MRI Tibia Fibula Without Contrast Left (Final result)  Result time 12/08/20 20:40:53    Final result by Milton Dove MD (12/08/20 20:40:53)                 Impression:      In this exam severely limited secondary to patient motion artifact, there is diffuse skin thickening and soft tissue edema throughout the left lower extremity, concerning for cellulitis.  Marrow signal of the tibia and fibula are normal without evidence of infiltrative process or edema to suggest osteomyelitis.    Surgical changes of foot amputation.  Increased STIR signal within the osseous structures of the remaining forefoot is suspected to reflect that of artifact given motion versus surgical change.  No associated low T1 signal to suggest definitively changes of osteomyelitis.  If there is continued concern, consider MRI of the foot for further evaluation.    Extensive intramuscular edema throughout the musculature of the left lower extremity.    Electronically signed by resident: Blayne Flores  Date:    12/08/2020  Time:    19:52    Electronically signed by: Milton Dove MD  Date:    12/08/2020  Time:    20:40             Narrative:    EXAMINATION:  MRI TIBIA FIBULA  WITHOUT CONTRAST LEFT    CLINICAL HISTORY:  Lower leg pain, osteomyelitis suspected, initial exam;  Pain in left lower leg    TECHNIQUE:  Multiplanar, multisequence imaging was obtained of the left lower extremity (lower leg) without the use of intravenous contrast.    COMPARISON:  Radiograph tibia-fibula two view left 12/08/2020.    FINDINGS:  Exam severely limited secondary to patient motion artifact.    Osseous structures are intact.  No acute fracture.  There is edema like signal involving the remaining osseous structures of the distal forefoot, versus artifact.  There does not appear to be convincing low T1 signal in the structures to convincingly reflect change of osteomyelitis.  There is slight increased STIR signal in the distal aspect of the calcaneus, this may reflect small focus of marrow edema however no convincing low T1 signal.    Left ankle joint alignment is within normal limits.  No joint effusions noted.    Extensive intramuscular edema throughout the musculature of the left lower extremity.    Diffuse skin thickening and soft tissue edema throughout the left lower extremity.    Visualized vascular structures are unremarkable.                               X-Ray Ankle Complete Left (Final result)  Result time 12/08/20 15:38:52    Final result by John Thomas MD (12/08/20 15:38:52)                 Impression:      Abnormal findings and recommendations as detailed above.      Electronically signed by: John Thomas  Date:    12/08/2020  Time:    15:38             Narrative:    EXAMINATION:  XR ANKLE COMPLETE 3 VIEW LEFT    CLINICAL HISTORY:  Non-pressure chronic ulcer of other part of unspecified foot with unspecified severity    TECHNIQUE:  AP, lateral and oblique views of the left ankle were performed.    COMPARISON:  March 18, 2012    FINDINGS:  As before, there has been partial amputation of the left foot to include most of the midfoot osseous structures, metatarsals, and toes.  With respect  to the remaining hindfoot structures, reconfirmed osteopenia and spurring about the Achilles tendon and plantar aponeurosis attachments.  With respect to the remaining tarsal bones, on the AP view, new areas of suggested cortical irregularity and bone loss of concern for osteomyelitis.  There is associated soft tissue swelling about the stump with areas of questionable soft tissue gas.  Would suggest further evaluation with MRI.  Extensive small vessel vascular calcifications.                               X-Ray Tibia Fibula 2 View Left (Final result)  Result time 12/08/20 14:35:39    Final result by Leonel Palma III, MD (12/08/20 14:35:39)                 Narrative:    EXAMINATION:  XR TIBIA FIBULA 2 VIEW LEFT    CLINICAL HISTORY:  Unspecified open wound, left lower leg, initial encounter    FINDINGS:  Two views tib fib left: There is baseline DJD and soft tissue swelling.  No fracture dislocation bone destruction seen.  There is severe degenerative change of the ankle and hindfoot.  If osteomyelitis is suspected MRI could be helpful.      Electronically signed by: Leonel Palma MD  Date:    12/08/2020  Time:    14:35                             X-Ray Chest AP Portable (Final result)  Result time 12/08/20 14:32:43    Final result by Meli Witt MD (12/08/20 14:32:43)                 Impression:      No pneumonia or other source of sepsis identified.      Electronically signed by: Meli Witt MD  Date:    12/08/2020  Time:    14:32             Narrative:    EXAMINATION:  XR CHEST AP PORTABLE    CLINICAL HISTORY:  Sepsis;    TECHNIQUE:  Single frontal view of the chest was performed.    COMPARISON:  10/15/2018.  03/18/2012.    FINDINGS:  X-ray beam attenuation and scatter occur in generous overlying soft tissues.    Mediastinal structures are midline. Cardiac silhouette and pulmonary vascular distribution are normal.    Lung volumes are normal and symmetric. I detect no pulmonary disease, pleural  fluid, lymph node enlargement, cardiac decompensation, pneumothorax, pneumomediastinum, pneumoperitoneum or significant osseous abnormality.                                Clinical Findings:  Left lower extremity  12/08/2020  Patient has multiple lower leg wounds that are superficial with minor drainage.  There are no periwound erythema, fluctuance or crepitus.  No purulent drainage.    His anterior and posterior foot wound are much deeper.  The posterior heel wound is about 1 cm deep but does not probe or have any exposed bone.  There is no periwound signs of infection to the posterior heel wound.    The anterior wound is deep with bony exposure at the lateral part of the wound.                  Right lower extremity  12/08/2020  Superficial wounds without any significant signs of infection.  There is some minor drainage otherwise there is no fluctuance or crepitus.          12/09/2020  Post debridement photo of the left anterior stump wound.  Measures 1 x 1.5 x 1.2 cm.              Assessment/Plan:     Osteomyelitis  Patient has bilateral lower extremity venous stasis ulcerations.  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  The anterior wound has exposed bone with likely osteomyelitis.    Plan:  - MRI of the leg was reviewed with no deep infection.  - wound was debrided today in cultures were taken of both the anterior foot wound and posterior leg wound.  Details of the procedure below.  - pending culture results.  Patient will need antibiotics according to Infectious Disease recommendations.  - nursing to continue local wound care as ordered.  - offload the heels with Z flex boot  - podiatry will follow    Procedure: Excisional Debridement    Supervising Provider: Dr. Narciso Hernandez DPM  Performing Provider: Chaka Canas DPM PGY3    12/09/2020    Written consent was obtained from the patient, the wound was thoroughly irrigated with normal saline and  clean with a sterile gauze.  A rongeur was used to excise 2 small portions of for culture and pathology. A dermal curette was used to debride the wound base and the periwound skin. The wound was debrided to the level of bone. skin, subcutaneous tissue, bone and biofilm was removed and a healthy granular base was established. Patient tolerated the procedure well with no complications.  A swab culture was then taken of the posterior calf wound.  The dressings were changed to bilateral lower extremities    Post-op measurements anterior stump: 1.0x1.5x1.2cm    Chaka Canas MD  Podiatry  [facility]

## 2020-12-09 NOTE — PT/OT/SLP EVAL
Physical Therapy  Evaluation and Treatment    Saji Castañeda   [id number]    Time Tracking:     PT Received On: 12/09/20   PT Start Time: 1004   PT Stop Time: 1029   PT Total Time (min): 25 min    Billable Minutes: Evaluation 1 procedure and Train/Wheelchair Management 10 minutes      *This session was completed as a co-evaluation with OT secondary to initial evaluation where therapists unsure how much assistance would be needed for patient to mobilize*    Recommendations:     Discharge recommendations: Home with  PT/OT     Equipment recommendations: None (has wheelchair, standard walker, grab bars next to toilet at home)    Barriers to Discharge: Inaccessible home environment (3 LON but patient states this has not been a barrier for him, performs independently using handrail)    Patient Information:     Recent Surgery: * No surgery found *      Diagnosis: Osteomyelitis    Length of Stay: 1 days    General Precautions: Standard, fall  Orthopedic Precautions: LLE WBAT (clarified with podiatry, Dr. White)  Brace: None    Assessment:     Saji Castañeda is a 70 y.o. male admitted to [facility] on 12/8/2020 for osteomyelitis. Saji Castañeda tolerated evaluation fair today. He presents with bilateral venous stasis ulcerations over BLE (worse on L than R). 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 States he has been previously able to ambulate without device using special shoe to L foot but since wounds have worsened, he has been primarily using wheelchair for mobility at home. Today he demonstrates ability to stand from wheelchair and pivot to/from toilet with supervision. Performed similar transfer from wheelchair to/from bed with supervision as well, no assistive device used. He does bear minimal weight onto LLE (residual Charcot amputation) but primarily relies on RLE for transfers/standing. He uses UE and LE to propel his own wheelchair within the room today. He feels if  "his wounds improved that he would be able to bear more weight onto his LLE for standing and ambulation. I spoke with Dr. Salcido and Dr. White (podiatry) after session, podiatry adding padding to L residual foot and ok with proceeding with weightbearing onto the L residual limb. Discussed PT role, POC, goals and recommendations (Home with HH PT/OT as he does have potential to ambulate using a walker, offload LLE as needed; no current DME needs as he has walker, wheelchair in place at home) with patient; verbalized understanding. Saji Castañeda would benefit from acute PT services to promote mobility during this admission and improve return to PLOF.    Problem List: weakness, decreased endurance, impaired self-care skills, impaired mobility, decreased sitting or standing balance, gait instability and impaired skin    Rehab Prognosis: Fair; patient would benefit from acute skilled PT services to address these deficits and reach maximum level of function.    Plan:     Patient to be seen 2 x/week to address the above listed problems via gait training, therapeutic activities, therapeutic exercises, neuromuscular re-education, wheelchair management/training    Plan of Care Expires: 01/08/21  Plan of Care reviewed with: patient    Subjective:     Communicated with RN prior to evaluation, appropriate to see for evaluation.    Pt found sitting up in wheelchair in bathroom upon PT entry to room, agreeable to evaluation.    Patient commenting: "I used to be able to walk with a special shoe but now the wounds are getting worse so I've just been using the wheelchair to get around."    Does this patient have any cultural, spiritual, Mu-ism conflicts given the current situation? Patient has no barriers to learning. Patient verbalizes understanding of his/her program and goals and demonstrates them correctly. No cultural, spiritual, or educational needs identified.    Past Medical History:   Diagnosis Date    Arthritis     " legs    Diabetes mellitus     Diabetes mellitus, type 2     Hyperlipidemia     Osteomyelitis       Past Surgical History:   Procedure Laterality Date    FOOT AMPUTATION  October 2010    left high midfoot amputation       Living Environment:  Pt lives with his sister, brother in a 1  with 3 LON with HR available. Has a jacuzzi tub at home for showering but states he simply propels himself in wheelchair next to tub, fills up a basin with soap/water and washes up while sitting in his wheelchair (does not get into tub).    PLOF:  Prior to admission, patient has been able to ambulate without device despite L Charcot amputation (states he was using a special shoe, likely a darco). But recently with wounds worsening, has been relying on his wheelchair to get around the home. Performs toileting mod (I), uses a grab bar next to toilet to assist with standing. Performs bathing while sitting up in wheelchair next to his jacuzzi tub (see living environment above). Family obtains groceries, sometimes he goes with family and uses the power/riding shopping [employer]. His sister is a retired dietician (who does outpatient dialysis) so fixes most of his meals. Brother is healthy and at home, available to assist if needed.    DME:  Patient owns or has access to the following DME: Standard Walker, Wheelchair and Grab bar (next to toilet)    Upon discharge, patient will have assistance from sister, brother.    Objective:     Patient found with: telemetry    Pain:  Pain Rating 1: 0/10  Pain Rating Post-Intervention 1: 0/10    Cognitive Exam:  Patient is oriented to Person, Place, Time and Situation.  Patient follows 100% of single-step commands.    Sensation:   Diminished to BLE for LT    Lower Extremity Range of Motion:  Right Lower Extremity: WFL actively  Left Lower Extremity: WFL actively (L charcot amputation)    Lower Extremity Strength:  Right Lower Extremity: grossly 4/5 via MMT  Left Lower Extremity: grossly 4-/5 via  MMT    Functional Mobility:    · Bed Mobility:  · NT; OOB throughout assessment    · Transfers (patient appropriately locks wheelchair brakes before getting in/out of wheelchair, without cueing from therapist):  · Bed to Chair: Supervision from wheelchair to bed with no AD via stand pivot (relying on RLE but does place minimal weight onto LLE) x 1 trial  · Chair to Bed: Supervision from bed to wheelchair with no AD via stand pivot (relying on RLE but does place minimal weight onto LLE) x 1 trial    · Toilet Transfer: Supervision on/off toilet to/from wheelchair with no AD x 1 trial either direction    · Gait:  · NT due to LE ulcerations. He's able to stand and pivot from bed <> wheelchair, minimal weight onto LLE, decreased stance time on L compared to R (I.e. in standing he is weightshifted to the R to offload L side)    · Balance:  · Static Sit: Independent at EOB    · Static Stand: Supervision with no AD, relying on RLE in standing, minimal weightbearing to LLE    · Wheelchair Propulsion:  · Pt propelled Standard wheelchair x 15 feet on Level tile with  Right upper extremity, Left upper extremity and Right lower extremity with Modified Independent.       Additional Therapeutic Activity/Exercises:     1. I spoke with Dr. Salcido and Dr. White (podiatry) after session, podiatry adding padding to L residual foot and ok with proceeding with weightbearing onto the L residual limb.    2. Discussed PT role, POC, goals and recommendations (Home with HH PT/OT as he does have potential to ambulate using a walker, offload LLE as needed; no current DME needs as he has walker, wheelchair in place at home) with patient; verbalized understanding.    3. Whiteboard was updated.    AM-PAC 6 CLICK MOBILITY  Turning over in bed (including adjusting bedclothes, sheets and blankets)?: 4  Sitting down on and standing up from a chair with arms (e.g., wheelchair, bedside commode, etc.): 4  Moving from lying on back to sitting on the  side of the bed?: 4  Moving to and from a bed to a chair (including a wheelchair)?: 4  Need to walk in hospital room?: 2  Climbing 3-5 steps with a railing?: 2  Basic Mobility Total Score: 20    Patient was left sitting up in wheelchair with all lines intact.    Clinical Decision Making for Evaluation Complexity:  1. Body System(s) Examination: 1-2  2. Clinical Presentation: Evolving  3. Evaluation Complexity: Low    GOALS:   Multidisciplinary Problems     Physical Therapy Goals        Problem: Physical Therapy Goal    Goal Priority Disciplines Outcome Goal Variances Interventions   Physical Therapy Goal     PT, PT/OT      Description: Goals to be met by: 20     Patient will increase functional independence with mobility by performin. Supine to sit with Modified Holt - Not met  2. Sit to stand transfer with Supervision using RW - Not met  3. Gait  x 20 feet with Contact Guard Assistance using Rolling Walker - Not met  4. Wheelchair propulsion x 100 feet with Supervision using bilateral upper and lower extremities - Not met  5. Stand for 5 minutes with Stand-by Assistance using Rolling Walker or UE support surface as needed - Not met                 Gurmeet Prado, PT  2020

## 2020-12-09 NOTE — NURSING
Received patient to room via wheelchair from ED. Report received from BEREKET Alvarez. Patient is AAOx4. Independent with ADLs. AC&HS daily. Insulin pens are in bin. Tele present with Bradycardia. Patient can go into low 40's. Visi placed on patient. Left foot amputated and wrapped in Kerlex and ACE bandage. Right leg dry and  wrapped with Kerlex and ACE bandage. Will continue to monitor patient.

## 2020-12-09 NOTE — SUBJECTIVE & OBJECTIVE
Interval history:  No acute events overnight.  Patient was resting comfortably in his wheelchair upon examination.  His pants are wet and soiled.    Scheduled Meds:   aspirin  81 mg Oral Daily    atorvastatin  40 mg Oral QHS    ceFEPime (MAXIPIME) IVPB  2 g Intravenous Q8H    enoxaparin  40 mg Subcutaneous Q24H    insulin aspart U-100  8 Units Subcutaneous TIDWM    insulin detemir U-100  25 Units Subcutaneous QHS    losartan  50 mg Oral Daily    metroNIDAZOLE  500 mg Oral Q8H    multivitamin  1 tablet Oral Daily    senna-docusate 8.6-50 mg  1 tablet Oral Daily    tamsulosin  0.4 mg Oral Daily    vancomycin (VANCOCIN) IVPB  1,500 mg Intravenous Q12H    zinc sulfate  220 mg Oral Daily     Continuous Infusions:  PRN Meds:acetaminophen, bisacodyL, dextrose 50%, dextrose 50%, glucagon (human recombinant), glucose, glucose, hydrALAZINE, HYDROcodone-acetaminophen, insulin aspart U-100, melatonin, morphine, ondansetron, sodium chloride 0.9%, Pharmacy to dose Vancomycin consult **AND** vancomycin - pharmacy to dose    Review of patient's allergies indicates:  No Known Allergies     Past Medical History:   Diagnosis Date    Arthritis     legs    Diabetes mellitus     Diabetes mellitus, type 2     Hyperlipidemia     Osteomyelitis      Past Surgical History:   Procedure Laterality Date    FOOT AMPUTATION  October 2010    left high midfoot amputation       Family History     Problem Relation (Age of Onset)    Cancer Brother    Diabetes Mother, Sister    Heart disease Mother    Stroke Sister        Tobacco Use    Smoking status: Never Smoker    Smokeless tobacco: Never Used   Substance and Sexual Activity    Alcohol use: No     Comment: occassional    Drug use: No    Sexual activity: Not Currently     Review of Systems   Constitutional: Negative for chills and fever.   Cardiovascular: Positive for leg swelling.   Gastrointestinal: Negative for nausea and vomiting.   Musculoskeletal: Positive for gait  problem.   Skin: Positive for color change and wound.     Objective:     Vital Signs (Most Recent):  Temp: 99.1 °F (37.3 °C) (12/09/20 1235)  Pulse: (!) 46 (12/09/20 0610)  Resp: 16 (12/09/20 0610)  BP: (!) 206/84 (12/09/20 1235)  SpO2: 96 % (12/09/20 1235) Vital Signs (24h Range):  Temp:  [98.5 °F (36.9 °C)-99.7 °F (37.6 °C)] 99.1 °F (37.3 °C)  Pulse:  [44-70] 46  Resp:  [16] 16  SpO2:  [96 %-100 %] 96 %  BP: (176-233)/() 206/84     Weight: 128.8 kg (284 lb)  Body mass index is 41.94 kg/m².    Foot Exam    Right Foot/Ankle     Inspection and Palpation  Ecchymosis: none  Tenderness: (Tenderness over the superficial peroneal nerve)  Swelling: (Diffuse lower extremity edema)  Skin Exam: drainage, dry skin, abnormal color and ulcer;     Neurovascular  Dorsalis pedis: 1+  Posterior tibial: 1+  Saphenous nerve sensation: diminished  Tibial nerve sensation: diminished  Superficial peroneal nerve sensation: diminished  Deep peroneal nerve sensation: diminished  Sural nerve sensation: diminished      Left Foot/Ankle      Inspection and Palpation  Ecchymosis: none  Tenderness: (Tenderness over superficial peroneal nerve)  Swelling: (Diffuse lower extremity edema)  Skin Exam: drainage, dry skin, abnormal color and ulcer;     Neurovascular  Dorsalis pedis: 1+  Posterior tibial: 1+  Saphenous nerve sensation: diminished  Tibial nerve sensation: diminished  Superficial peroneal nerve sensation: diminished  Deep peroneal nerve sensation: diminished  Sural nerve sensation: diminished            Laboratory:  CBC:   Recent Labs   Lab 12/09/20  0355   WBC 7.90   RBC 3.70*   HGB 9.5*   HCT 31.0*      MCV 84   MCH 25.7*   MCHC 30.6*     CMP:   Recent Labs   Lab 12/09/20  0355   *   CALCIUM 8.6*   ALBUMIN 2.3*   PROT 6.8      K 3.8   CO2 28      BUN 16   CREATININE 1.2   ALKPHOS 70   ALT 13   AST 17   BILITOT 0.3     CRP:   Recent Labs   Lab 12/08/20  1326   CRP 31.4*     ESR:   Recent Labs   Lab  12/08/20  1326   SEDRATE 80*       Diagnostic Results:  I have reviewed all pertinent imaging results/findings within the past 24 hours.  Imaging Results          US Lower Extremity Arteries Bilateral (Final result)  Result time 12/09/20 09:51:55   Procedure changed from US Lower Extrem Arteries Bilat with MARIO (xpd)     Final result by Sukumar Krause MD (12/09/20 09:51:55)                 Impression:      Doubling of velocity in the right anterior tibial artery and diminished velocity within the right posterior tibial artery accompanied by abnormal waveforms suggesting significant stenoses.  Abnormal waveforms throughout the left lower extremity without significant velocity change may indicate more proximal stenosis, similar to prior study.    Electronically signed by resident: Luly Narvaez  Date:    12/09/2020  Time:    09:30    Electronically signed by: Sukumar Krause MD  Date:    12/09/2020  Time:    09:51             Narrative:    EXAMINATION:  US LOWER EXTREMITY ARTERIES BILATERAL    CLINICAL HISTORY:  PVD;  Type 2 diabetes mellitus with diabetic neuropathy, unspecified    TECHNIQUE:  Bilateral lower extremity arterial duplex ultrasound examination performed. Multiple gray scale and color doppler images were obtained in addition to waveform analysis.  Ankle-brachial indices were calculated.    COMPARISON:  Ultrasound bilateral lower extremity arteries 12/04/2019.    FINDINGS:  The peak systolic velocities on the right are as follows, in centimeters/second:    Common femoral artery: 190    Deep femoral artery: 78    Superficial femoral artery, proximal: 118    Superficial femoral artery, mid portion: 132    Superficial femoral artery, distal: 112    Popliteal artery, proximal: 67    Popliteal artery, distal: 69    Posterior tibial artery: 32    Anterior tibial artery: 150    The peak systolic velocities on the left are as follows, in centimeters/second:    Common femoral artery: 120    Deep femoral artery:  89    Superficial femoral artery, proximal: 119    Superficial femoral artery, mid portion: 176    Superficial femoral artery, distal: 97    Popliteal artery, proximal: 130    Popliteal artery, distal: 125    Posterior tibial artery: 90    Anterior tibial artery: 123    Left arterial waveforms are monophasic beginning in the left superficial femoral artery, but without visualized stenosis or occlusion.  This is similar to exam from 12/04/2019.    Right posterior tibial artery demonstrates monophasic waveforms.                               MRI Tibia Fibula Without Contrast Left (Final result)  Result time 12/08/20 20:40:53    Final result by Milton Dove MD (12/08/20 20:40:53)                 Impression:      In this exam severely limited secondary to patient motion artifact, there is diffuse skin thickening and soft tissue edema throughout the left lower extremity, concerning for cellulitis.  Marrow signal of the tibia and fibula are normal without evidence of infiltrative process or edema to suggest osteomyelitis.    Surgical changes of foot amputation.  Increased STIR signal within the osseous structures of the remaining forefoot is suspected to reflect that of artifact given motion versus surgical change.  No associated low T1 signal to suggest definitively changes of osteomyelitis.  If there is continued concern, consider MRI of the foot for further evaluation.    Extensive intramuscular edema throughout the musculature of the left lower extremity.    Electronically signed by resident: Blayne Flores  Date:    12/08/2020  Time:    19:52    Electronically signed by: Milton Dove MD  Date:    12/08/2020  Time:    20:40             Narrative:    EXAMINATION:  MRI TIBIA FIBULA WITHOUT CONTRAST LEFT    CLINICAL HISTORY:  Lower leg pain, osteomyelitis suspected, initial exam;  Pain in left lower leg    TECHNIQUE:  Multiplanar, multisequence imaging was obtained of the left lower extremity (lower leg) without the  use of intravenous contrast.    COMPARISON:  Radiograph tibia-fibula two view left 12/08/2020.    FINDINGS:  Exam severely limited secondary to patient motion artifact.    Osseous structures are intact.  No acute fracture.  There is edema like signal involving the remaining osseous structures of the distal forefoot, versus artifact.  There does not appear to be convincing low T1 signal in the structures to convincingly reflect change of osteomyelitis.  There is slight increased STIR signal in the distal aspect of the calcaneus, this may reflect small focus of marrow edema however no convincing low T1 signal.    Left ankle joint alignment is within normal limits.  No joint effusions noted.    Extensive intramuscular edema throughout the musculature of the left lower extremity.    Diffuse skin thickening and soft tissue edema throughout the left lower extremity.    Visualized vascular structures are unremarkable.                               X-Ray Ankle Complete Left (Final result)  Result time 12/08/20 15:38:52    Final result by John Thomas MD (12/08/20 15:38:52)                 Impression:      Abnormal findings and recommendations as detailed above.      Electronically signed by: John Thomas  Date:    12/08/2020  Time:    15:38             Narrative:    EXAMINATION:  XR ANKLE COMPLETE 3 VIEW LEFT    CLINICAL HISTORY:  Non-pressure chronic ulcer of other part of unspecified foot with unspecified severity    TECHNIQUE:  AP, lateral and oblique views of the left ankle were performed.    COMPARISON:  March 18, 2012    FINDINGS:  As before, there has been partial amputation of the left foot to include most of the midfoot osseous structures, metatarsals, and toes.  With respect to the remaining hindfoot structures, reconfirmed osteopenia and spurring about the Achilles tendon and plantar aponeurosis attachments.  With respect to the remaining tarsal bones, on the AP view, new areas of suggested cortical  irregularity and bone loss of concern for osteomyelitis.  There is associated soft tissue swelling about the stump with areas of questionable soft tissue gas.  Would suggest further evaluation with MRI.  Extensive small vessel vascular calcifications.                               X-Ray Tibia Fibula 2 View Left (Final result)  Result time 12/08/20 14:35:39    Final result by Leonel Palma III, MD (12/08/20 14:35:39)                 Narrative:    EXAMINATION:  XR TIBIA FIBULA 2 VIEW LEFT    CLINICAL HISTORY:  Unspecified open wound, left lower leg, initial encounter    FINDINGS:  Two views tib fib left: There is baseline DJD and soft tissue swelling.  No fracture dislocation bone destruction seen.  There is severe degenerative change of the ankle and hindfoot.  If osteomyelitis is suspected MRI could be helpful.      Electronically signed by: Leonel Palma MD  Date:    12/08/2020  Time:    14:35                             X-Ray Chest AP Portable (Final result)  Result time 12/08/20 14:32:43    Final result by Meli Witt MD (12/08/20 14:32:43)                 Impression:      No pneumonia or other source of sepsis identified.      Electronically signed by: Meli Witt MD  Date:    12/08/2020  Time:    14:32             Narrative:    EXAMINATION:  XR CHEST AP PORTABLE    CLINICAL HISTORY:  Sepsis;    TECHNIQUE:  Single frontal view of the chest was performed.    COMPARISON:  10/15/2018.  03/18/2012.    FINDINGS:  X-ray beam attenuation and scatter occur in generous overlying soft tissues.    Mediastinal structures are midline. Cardiac silhouette and pulmonary vascular distribution are normal.    Lung volumes are normal and symmetric. I detect no pulmonary disease, pleural fluid, lymph node enlargement, cardiac decompensation, pneumothorax, pneumomediastinum, pneumoperitoneum or significant osseous abnormality.                                Clinical Findings:  Left lower extremity  12/08/2020  Patient  has multiple lower leg wounds that are superficial with minor drainage.  There are no periwound erythema, fluctuance or crepitus.  No purulent drainage.    His anterior and posterior foot wound are much deeper.  The posterior heel wound is about 1 cm deep but does not probe or have any exposed bone.  There is no periwound signs of infection to the posterior heel wound.    The anterior wound is deep with bony exposure at the lateral part of the wound.                  Right lower extremity  12/08/2020  Superficial wounds without any significant signs of infection.  There is some minor drainage otherwise there is no fluctuance or crepitus.          12/09/2020  Post debridement photo of the left anterior stump wound.  Measures 1 x 1.5 x 1.2 cm.

## 2020-12-09 NOTE — PLAN OF CARE
OT evaluation completed, POC established.    Problem: Occupational Therapy Goal  Goal: Occupational Therapy Goal  Description: Goals set on 12/9, with expiration date 12/23:  Patient will increase functional independence with ADLs by performing:    Bed mobility with [place]  Grooming while seated at sink with [place]  UB Dressing with [place].  LB Dressing with [place].  Toileting from toilet with stand pivot technique with [place]  for hygiene and clothing management.   Functional mobility of household and community distance with [place] and AD as needed  Pt will demonstrate understanding of education provided regarding energy conservation and task modification through teach-back method.      Outcome: Ongoing, Progressing    JEANNE Prince  12/9/2020

## 2020-12-10 PROBLEM — I73.9 PERIPHERAL ARTERIAL DISEASE: Chronic | Status: ACTIVE | Noted: 2020-12-10

## 2020-12-10 LAB
ALBUMIN SERPL BCP-MCNC: 2.4 G/DL (ref 3.5–5.2)
ALP SERPL-CCNC: 64 U/L (ref 55–135)
ALT SERPL W/O P-5'-P-CCNC: 14 U/L (ref 10–44)
ANION GAP SERPL CALC-SCNC: 10 MMOL/L (ref 8–16)
AST SERPL-CCNC: 21 U/L (ref 10–40)
BASOPHILS # BLD AUTO: 0.03 K/UL (ref 0–0.2)
BASOPHILS NFR BLD: 0.5 % (ref 0–1.9)
BILIRUB SERPL-MCNC: 0.3 MG/DL (ref 0.1–1)
BUN SERPL-MCNC: 14 MG/DL (ref 8–23)
CALCIUM SERPL-MCNC: 8.5 MG/DL (ref 8.7–10.5)
CHLORIDE SERPL-SCNC: 102 MMOL/L (ref 95–110)
CO2 SERPL-SCNC: 24 MMOL/L (ref 23–29)
CREAT SERPL-MCNC: 1.2 MG/DL (ref 0.5–1.4)
DIFFERENTIAL METHOD: ABNORMAL
EOSINOPHIL # BLD AUTO: 0.4 K/UL (ref 0–0.5)
EOSINOPHIL NFR BLD: 5.8 % (ref 0–8)
ERYTHROCYTE [DISTWIDTH] IN BLOOD BY AUTOMATED COUNT: 16.6 % (ref 11.5–14.5)
EST. GFR  (AFRICAN AMERICAN): >60 ML/MIN/1.73 M^2
EST. GFR  (NON AFRICAN AMERICAN): >60 ML/MIN/1.73 M^2
GLUCOSE SERPL-MCNC: 121 MG/DL (ref 70–110)
HCT VFR BLD AUTO: 33.3 % (ref 40–54)
HGB BLD-MCNC: 10.4 G/DL (ref 14–18)
IMM GRANULOCYTES # BLD AUTO: 0.03 K/UL (ref 0–0.04)
IMM GRANULOCYTES NFR BLD AUTO: 0.5 % (ref 0–0.5)
LYMPHOCYTES # BLD AUTO: 1 K/UL (ref 1–4.8)
LYMPHOCYTES NFR BLD: 15.2 % (ref 18–48)
MCH RBC QN AUTO: 25.9 PG (ref 27–31)
MCHC RBC AUTO-ENTMCNC: 31.2 G/DL (ref 32–36)
MCV RBC AUTO: 83 FL (ref 82–98)
MONOCYTES # BLD AUTO: 0.7 K/UL (ref 0.3–1)
MONOCYTES NFR BLD: 11.4 % (ref 4–15)
NEUTROPHILS # BLD AUTO: 4.3 K/UL (ref 1.8–7.7)
NEUTROPHILS NFR BLD: 66.6 % (ref 38–73)
NRBC BLD-RTO: 0 /100 WBC
PLATELET # BLD AUTO: 257 K/UL (ref 150–350)
PMV BLD AUTO: 10.7 FL (ref 9.2–12.9)
POCT GLUCOSE: 129 MG/DL (ref 70–110)
POCT GLUCOSE: 200 MG/DL (ref 70–110)
POCT GLUCOSE: 201 MG/DL (ref 70–110)
POCT GLUCOSE: 318 MG/DL (ref 70–110)
POCT GLUCOSE: 325 MG/DL (ref 70–110)
POTASSIUM SERPL-SCNC: 4.2 MMOL/L (ref 3.5–5.1)
PROT SERPL-MCNC: 7.1 G/DL (ref 6–8.4)
RBC # BLD AUTO: 4.02 M/UL (ref 4.6–6.2)
SODIUM SERPL-SCNC: 136 MMOL/L (ref 136–145)
VANCOMYCIN TROUGH SERPL-MCNC: 11.7 UG/ML (ref 10–22)
WBC # BLD AUTO: 6.4 K/UL (ref 3.9–12.7)

## 2020-12-10 PROCEDURE — 63600175 PHARM REV CODE 636 W HCPCS: Performed by: HOSPITALIST

## 2020-12-10 PROCEDURE — 99233 SBSQ HOSP IP/OBS HIGH 50: CPT | Mod: ,,, | Performed by: HOSPITALIST

## 2020-12-10 PROCEDURE — 80202 ASSAY OF VANCOMYCIN: CPT

## 2020-12-10 PROCEDURE — 25000003 PHARM REV CODE 250: Performed by: HOSPITALIST

## 2020-12-10 PROCEDURE — 25000003 PHARM REV CODE 250: Performed by: PHYSICIAN ASSISTANT

## 2020-12-10 PROCEDURE — 99233 SBSQ HOSP IP/OBS HIGH 50: CPT | Mod: ,,, | Performed by: PHYSICIAN ASSISTANT

## 2020-12-10 PROCEDURE — 11000001 HC ACUTE MED/SURG PRIVATE ROOM

## 2020-12-10 PROCEDURE — 80053 COMPREHEN METABOLIC PANEL: CPT

## 2020-12-10 PROCEDURE — 97802 MEDICAL NUTRITION INDIV IN: CPT

## 2020-12-10 PROCEDURE — 36415 COLL VENOUS BLD VENIPUNCTURE: CPT

## 2020-12-10 PROCEDURE — 99233 PR SUBSEQUENT HOSPITAL CARE,LEVL III: ICD-10-PCS | Mod: ,,, | Performed by: PHYSICIAN ASSISTANT

## 2020-12-10 PROCEDURE — 63600175 PHARM REV CODE 636 W HCPCS: Performed by: PHYSICIAN ASSISTANT

## 2020-12-10 PROCEDURE — 99232 SBSQ HOSP IP/OBS MODERATE 35: CPT | Mod: ,,, | Performed by: SURGERY

## 2020-12-10 PROCEDURE — 85025 COMPLETE CBC W/AUTO DIFF WBC: CPT

## 2020-12-10 PROCEDURE — 99233 PR SUBSEQUENT HOSPITAL CARE,LEVL III: ICD-10-PCS | Mod: ,,, | Performed by: HOSPITALIST

## 2020-12-10 PROCEDURE — 99232 PR SUBSEQUENT HOSPITAL CARE,LEVL II: ICD-10-PCS | Mod: ,,, | Performed by: SURGERY

## 2020-12-10 RX ORDER — ENOXAPARIN SODIUM 100 MG/ML
40 INJECTION SUBCUTANEOUS EVERY 12 HOURS
Status: DISCONTINUED | OUTPATIENT
Start: 2020-12-10 | End: 2020-12-14 | Stop reason: HOSPADM

## 2020-12-10 RX ADMIN — HYDRALAZINE HYDROCHLORIDE AND ISOSORBIDE DINITRATE 1 TABLET: 37.5; 2 TABLET, FILM COATED ORAL at 09:12

## 2020-12-10 RX ADMIN — HYDROCODONE BITARTRATE AND ACETAMINOPHEN 1 TABLET: 5; 325 TABLET ORAL at 08:12

## 2020-12-10 RX ADMIN — METRONIDAZOLE 500 MG: 500 TABLET ORAL at 05:12

## 2020-12-10 RX ADMIN — METRONIDAZOLE 500 MG: 500 TABLET ORAL at 10:12

## 2020-12-10 RX ADMIN — CEFEPIME 2 G: 1 INJECTION, POWDER, FOR SOLUTION INTRAMUSCULAR; INTRAVENOUS at 04:12

## 2020-12-10 RX ADMIN — ENOXAPARIN SODIUM 40 MG: 40 INJECTION SUBCUTANEOUS at 10:12

## 2020-12-10 RX ADMIN — ASPIRIN 81 MG CHEWABLE TABLET 81 MG: 81 TABLET CHEWABLE at 09:12

## 2020-12-10 RX ADMIN — INSULIN ASPART 8 UNITS: 100 INJECTION, SOLUTION INTRAVENOUS; SUBCUTANEOUS at 05:12

## 2020-12-10 RX ADMIN — HYDRALAZINE HYDROCHLORIDE AND ISOSORBIDE DINITRATE 1 TABLET: 37.5; 2 TABLET, FILM COATED ORAL at 10:12

## 2020-12-10 RX ADMIN — ATORVASTATIN CALCIUM 40 MG: 20 TABLET, FILM COATED ORAL at 10:12

## 2020-12-10 RX ADMIN — INSULIN ASPART 8 UNITS: 100 INJECTION, SOLUTION INTRAVENOUS; SUBCUTANEOUS at 11:12

## 2020-12-10 RX ADMIN — HYDRALAZINE HYDROCHLORIDE 25 MG: 25 TABLET ORAL at 04:12

## 2020-12-10 RX ADMIN — ZINC SULFATE 220 MG (50 MG) CAPSULE 220 MG: CAPSULE at 09:12

## 2020-12-10 RX ADMIN — VANCOMYCIN HYDROCHLORIDE 1500 MG: 1.5 INJECTION, POWDER, LYOPHILIZED, FOR SOLUTION INTRAVENOUS at 06:12

## 2020-12-10 RX ADMIN — METRONIDAZOLE 500 MG: 500 TABLET ORAL at 01:12

## 2020-12-10 RX ADMIN — VANCOMYCIN HYDROCHLORIDE 1500 MG: 1.5 INJECTION, POWDER, LYOPHILIZED, FOR SOLUTION INTRAVENOUS at 05:12

## 2020-12-10 RX ADMIN — INSULIN DETEMIR 25 UNITS: 100 INJECTION, SOLUTION SUBCUTANEOUS at 10:12

## 2020-12-10 RX ADMIN — INSULIN ASPART 2 UNITS: 100 INJECTION, SOLUTION INTRAVENOUS; SUBCUTANEOUS at 11:12

## 2020-12-10 RX ADMIN — CEFEPIME 2 G: 1 INJECTION, POWDER, FOR SOLUTION INTRAMUSCULAR; INTRAVENOUS at 11:12

## 2020-12-10 RX ADMIN — HYDRALAZINE HYDROCHLORIDE AND ISOSORBIDE DINITRATE 1 TABLET: 37.5; 2 TABLET, FILM COATED ORAL at 03:12

## 2020-12-10 RX ADMIN — INSULIN ASPART 2 UNITS: 100 INJECTION, SOLUTION INTRAVENOUS; SUBCUTANEOUS at 10:12

## 2020-12-10 RX ADMIN — THERA TABS 1 TABLET: TAB at 09:12

## 2020-12-10 RX ADMIN — HYDROCODONE BITARTRATE AND ACETAMINOPHEN 1 TABLET: 5; 325 TABLET ORAL at 03:12

## 2020-12-10 RX ADMIN — LOSARTAN POTASSIUM 50 MG: 50 TABLET, FILM COATED ORAL at 09:12

## 2020-12-10 RX ADMIN — ACETAMINOPHEN 650 MG: 325 TABLET ORAL at 09:12

## 2020-12-10 RX ADMIN — ACETAMINOPHEN 650 MG: 325 TABLET ORAL at 03:12

## 2020-12-10 RX ADMIN — DOCUSATE SODIUM 50MG AND SENNOSIDES 8.6MG 1 TABLET: 8.6; 5 TABLET, FILM COATED ORAL at 09:12

## 2020-12-10 RX ADMIN — TAMSULOSIN HYDROCHLORIDE 0.4 MG: 0.4 CAPSULE ORAL at 09:12

## 2020-12-10 NOTE — NURSING
Notified by Telemetry room patient is in Afib HR dropped down to 36. Pt asymptomatic at time. Denies any chest pain. B/P 196/86 P 69. Dr. Michele notified at 2118. Charge nurse notified. RR nurses notified. Will cont. To monitor pt.

## 2020-12-10 NOTE — NURSING
Pt has remained in Afib throughout the night, HR dropping at times between 38-39, B/P 176/75. PRN Hydralazine administered, See MAR. Reported this to SHAYNE Michele with team GEORGIE. Suggested a consult with cardiology. Charge nurse aware. RR nurses aware. Will cont. To monitor pt closely and respond as needed.

## 2020-12-10 NOTE — SUBJECTIVE & OBJECTIVE
Interval History:   Vascular surgery following, possible angiogram Monday pending vascular studies  Wound cultures +proteus  Bone cultures gram stain +GNR  Aerobic cultures pending  Blood cultures NGTD  Remained afebrile, stable, no acute complaints or concerns.     Review of Systems   Constitutional: Negative for activity change, appetite change, chills, diaphoresis, fatigue and fever.   Respiratory: Negative for cough and shortness of breath.    Cardiovascular: Negative for chest pain, palpitations and leg swelling.   Gastrointestinal: Negative for abdominal pain, diarrhea, nausea and vomiting.   Genitourinary: Negative for dysuria.   Musculoskeletal: Negative for back pain.   Skin: Positive for wound. Negative for color change and pallor.   Neurological: Negative for dizziness and headaches.   All other systems reviewed and are negative.    Objective:     Vital Signs (Most Recent):  Temp: 98.6 °F (37 °C) (12/10/20 0856)  Pulse: 63 (12/10/20 1000)  Resp: 19 (12/10/20 0859)  BP: (!) 188/82 (12/10/20 0856)  SpO2: 98 % (12/10/20 0856) Vital Signs (24h Range):  Temp:  [96.2 °F (35.7 °C)-99.1 °F (37.3 °C)] 98.6 °F (37 °C)  Pulse:  [] 63  Resp:  [18-20] 19  SpO2:  [92 %-98 %] 98 %  BP: (127-206)/(61-84) 188/82     Weight: 136 kg (299 lb 13.2 oz)  Body mass index is 44.28 kg/m².    Estimated Creatinine Clearance: 77.3 mL/min (based on SCr of 1.2 mg/dL).    Physical Exam  Vitals signs and nursing note reviewed.   Constitutional:       General: He is not in acute distress.     Appearance: Normal appearance. He is not ill-appearing, toxic-appearing or diaphoretic.   HENT:      Head: Normocephalic.      Nose: Nose normal.   Eyes:      Pupils: Pupils are equal, round, and reactive to light.   Cardiovascular:      Rate and Rhythm: Normal rate and regular rhythm.   Pulmonary:      Effort: Pulmonary effort is normal. No respiratory distress.      Breath sounds: Normal breath sounds. No stridor.   Abdominal:      General:  Abdomen is flat. There is no distension.      Palpations: Abdomen is soft. There is no mass.      Tenderness: There is no abdominal tenderness.      Hernia: No hernia is present.   Musculoskeletal:         General: Tenderness present. No deformity or signs of injury.      Left lower leg: Edema present.   Skin:     General: Skin is warm and dry.      Coloration: Skin is not jaundiced or pale.      Comments: BLE wrapped at this time. Refer to media   Neurological:      General: No focal deficit present.      Mental Status: He is alert and oriented to person, place, and time.   Psychiatric:         Mood and Affect: Mood normal.         Significant Labs: All pertinent labs within the past 24 hours have been reviewed.    Significant Imaging: I have reviewed all pertinent imaging results/findings within the past 24 hours.

## 2020-12-10 NOTE — CARE UPDATE
"RAPID RESPONSE NURSE PROACTIVE ROUNDING NOTE     Time of Visit: 250    Admit Date: 2020  LOS: 2  Code Status: Prior   Date of Visit: 12/10/2020  : 1950  Age: 70 y.o.  Sex: male  Race: Black or   Bed: 625/[facility] A:   MRN: [id number]  Was the patient discharged from an ICU this admission? no   Was the patient discharged from a PACU within last 24 hours?  no   Did the patient receive conscious sedation/general anesthesia in last 24 hours?  no  Was the patient in the ED within the past 24 hours?  yes  Was the patient started on NIPPV within the past 24 hours?  no  Attending Physician: Emily Salcido MD  Primary Service: [facility] MED A    ASSESSMENT     Notified by charge RN during rounding.  Reason for alert: bradycardia    Diagnosis: Osteomyelitis    Abnormal Vital Signs: BP (!) 160/70 (Patient Position: Lying)   Pulse 68   Temp 98.2 °F (36.8 °C) (Oral)   Resp 18   Ht 5' 9" (1.753 m)   Wt 136 kg (299 lb 13.2 oz)   SpO2 95%   BMI 44.28 kg/m²      Clinical Issues: Dysrythmia    Patient  has a past medical history of Arthritis, Diabetes mellitus, Diabetes mellitus, type 2, Hyperlipidemia, and Osteomyelitis.    While on 6th floor for proactive rounding notified by charge BEREKET Haider and bedside BEREKET Valle of patient having episodes of bradycardia as low as 37bpm noted on telemetry monitor     INTERVENTIONS/ RECOMMENDATIONS     Patient not sustaining bradycardia to 30s. HR irregular with a rate between 40 and 60s. /70. Patient asymptomatic, aaox4. Denies any dizziness/lightheadedness or palpitations. States he has never been told he needs a pacemaker.  Continue telemetry monitoring. Consider consult to cardiology    Discussed plan of care with Leonard CUBA.    PHYSICIAN ESCALATION     Yes/No  no    Disposition: Remain in room 625.    FOLLOW-UP     Call back the Rapid Response Nurse, Racquel Feliciano RN at [phone] for additional questions or concerns.        "

## 2020-12-10 NOTE — PROGRESS NOTES
No complaints, looks okay this morning.  Plan to elevate legs with 3 pillows at all times to reduce edema.  Will check vascular lab noninvasive studies tomorrow.  If necessary, angiogram likely Monday.    Narciso Perez MD PGY-7  Vascular and Endovascular Surgery Fellow  12/10/2020

## 2020-12-10 NOTE — PROGRESS NOTES
Pharmacokinetic Assessment Follow Up: IV Vancomycin    Vancomycin serum concentration assessment(s):  · Trough of 11.7 mcg/mL was drawn appropriately and will be used to guide therapy  · Subtherapeutic trough for target of 15-20 mcg/mL. However, expect that patient will accumulate due to obesity and age.   · Renal function at baseline    Vancomycin Regimen Plan:  1. Will continue vancomycin 1500mg q12 hours  2. Repeat trough prior to 4th dose on 12/12 @ 0500    Drug levels (last 3 results):  Recent Labs   Lab Result Units 12/10/20  0513   Vancomycin-Trough ug/mL 11.7       Pharmacy will continue to follow and monitor vancomycin.    Please contact pharmacy at extension [phone] for questions regarding this assessment.    Thank you for the consult,   Lisbeth Rothman       Patient brief summary:  Saji Castañeda is a 70 y.o. male initiated on antimicrobial therapy with IV Vancomycin for treatment of bone/joint infection    The patient's current regimen is 1500mg q12 hours    Drug Allergies:   Review of patient's allergies indicates:  No Known Allergies    Actual Body Weight:   136 kg    Renal Function:   Estimated Creatinine Clearance: 77.3 mL/min (based on SCr of 1.2 mg/dL).,       CBC (last 72 hours):  Recent Labs   Lab Result Units 12/08/20  1326 12/09/20  0355 12/10/20  0513   WBC K/uL 6.36 7.90 6.40   Hemoglobin g/dL 10.9* 9.5* 10.4*   Hemoglobin A1C % 7.9*  --   --    Hematocrit % 36.3* 31.0* 33.3*   Platelets K/uL 325 299 257   Gran % % 62.6 70.5 66.6   Lymph % % 22.5 16.6* 15.2*   Mono % % 9.6 9.6 11.4   Eosinophil % % 4.7 2.4 5.8   Basophil % % 0.3 0.4 0.5   Differential Method  Automated Automated Automated       Metabolic Panel (last 72 hours):  Recent Labs   Lab Result Units 12/08/20  1326 12/08/20  1533 12/09/20  0355 12/10/20  0513   Sodium mmol/L 139  --  137 136   Potassium mmol/L 4.0  --  3.8 4.2   Chloride mmol/L 99  --  101 102   CO2 mmol/L 31*  --  28 24   Glucose mg/dL 163*  --  117* 121*   Glucose, UA    --  Negative  --   --    BUN mg/dL 17  --  16 14   Creatinine mg/dL 1.3  --  1.2 1.2   Albumin g/dL 2.9*  --  2.3* 2.4*   Total Bilirubin mg/dL 0.3  --  0.3 0.3   Alkaline Phosphatase U/L 76  --  70 64   AST U/L 18  --  17 21   ALT U/L 16  --  13 14       Vancomycin Administrations:  vancomycin given in the last 96 hours                   vancomycin 1.5 g in dextrose 5 % 250 mL IVPB (ready to mix) (mg) 1,500 mg New Bag 12/10/20 0531     1,500 mg New Bag 12/09/20 1840     1,500 mg New Bag  0610    vancomycin (VANCOCIN) 2,500 mg in dextrose 5 % 500 mL IVPB (mg) 2,500 mg New Bag 12/08/20 2004                Microbiologic Results:  Microbiology Results (last 7 days)     Procedure Component Value Units Date/Time    Gram stain [[id number]] Collected: 12/09/20 1153    Order Status: Completed Specimen: Bone from Foot, Left Updated: 12/09/20 2209     Gram Stain Result No WBC's      Rare Gram negative rods    Urine culture [[id number]] Collected: 12/08/20 1533    Order Status: Completed Specimen: Urine Updated: 12/09/20 1944     Urine Culture, Routine No growth    Narrative:      Specimen Source->Urine    Gram stain [[id number]] Collected: 12/09/20 1154    Order Status: Completed Specimen: Wound from Leg, Left Updated: 12/09/20 1717     Gram Stain Result Rare WBC's      Rare Gram positive cocci      Few Gram negative rods    Blood culture x two cultures. Draw prior to antibiotics. [[id number]] Collected: 12/08/20 1326    Order Status: Completed Specimen: Blood from Peripheral, Hand, Left Updated: 12/09/20 1612     Blood Culture, Routine No Growth to date      No Growth to date    Narrative:      Aerobic and anaerobic    Blood culture x two cultures. Draw prior to antibiotics. [[id number]] Collected: 12/08/20 1326    Order Status: Completed Specimen: Blood from Peripheral, Hand, Right Updated: 12/09/20 1612     Blood Culture, Routine No Growth to date      No Growth to date    Narrative:      Aerobic and anaerobic    Culture,  Anaerobe [[id number]] Collected: 12/09/20 1154    Order Status: Sent Specimen: Wound from Leg, Left Updated: 12/09/20 1223    Aerobic culture [[id number]] Collected: 12/09/20 1154    Order Status: Sent Specimen: Wound from Leg, Left Updated: 12/09/20 1222    AFB Culture & Smear [[id number]] Collected: 12/09/20 1153    Order Status: Sent Specimen: Bone from Foot, Left Updated: 12/09/20 1219    Fungus culture [[id number]] Collected: 12/09/20 1153    Order Status: Sent Specimen: Bone from Foot, Left Updated: 12/09/20 1218    Aerobic culture [[id number]] Collected: 12/09/20 1153    Order Status: Sent Specimen: Bone from Foot, Left Updated: 12/09/20 1218    Culture, Anaerobe [[id number]] Collected: 12/09/20 1153    Order Status: Sent Specimen: Bone from Foot, Left Updated: 12/09/20 1217

## 2020-12-10 NOTE — CONSULTS
"  [facility]  Adult Nutrition  Consult Note    SUMMARY     Recommendations    Recommendation/Intervention:   1. Continue Diabetic 2000 kcal diet.  2. Add Kvng TID.   3. Add Beneprotein TID.    Goals: 1. Pt's intake meals >75% x 7 days.  Nutrition Goal Status: new  Communication of RD Recs: reviewed with physician    Reason for Assessment    Reason For Assessment: consult  Diagnosis: (osteoporosis left leg)  Relevant Medical History: s/p amputation left foot, DM, HLD, peripheral venous stasis, PVD  General Information Comments: Pt s/p recent amputation left foot r/t osteomyelitis admitted for worsening wounds to left stump.Pt with 75% intake meals and reports good appetite and good po intake PTA. Completed NFPE today: pt appears nourished, mild lower body edema. Pt with A1c 7.9. Sister cooks for him at home and he drinks sweet tea and eats fried foods regularly. Educated pt on healthy lower carb drinks and carb consistent meals using My Plate method.    Nutrition Discharge Planning: Discharge on high protein, carb consistent diet for wound healing.    Nutrition Risk Screen    Nutrition Risk Screen: large or nonhealing wound, burn or pressure injury    Nutrition/Diet History    Patient Reported Diet/Restrictions/Preferences: general  Spiritual, Cultural Beliefs, Confucianist Practices, Values that Affect Care: no    Anthropometrics    Temp: 98.6 °F (37 °C)  Height Method: Stated  Height: 5' 9" (175.3 cm)  Height (inches): 69 in  Weight Method: Bed Scale  Weight: 136 kg (299 lb 13.2 oz)  Weight (lb): 299.83 lb  Ideal Body Weight (IBW), Male: 160 lb  % Ideal Body Weight, Male (lb): 187.39 %  BMI (Calculated): 44.3       Lab/Procedures/Meds    Pertinent Labs Reviewed: reviewed  Pertinent Labs Comments: A1c 7.9, Glu 117, Alb 2.3   Pertinent Medications Reviewed: reviewed  Pertinent Medications Comments: statin, aspart, detemir, MVI, senna, colace, MVI, zinc sulfate    Estimated/Assessed Needs    Weight " Used For Calorie Calculations: 136 kg (299 lb 13.2 oz)  Energy Calorie Requirements (kcal): 2316 kcal  Energy Need Method: [place]-St Jeor(PAL 1.10)  Protein Requirements: 109-122g  Weight Used For Protein Calculations: 136 kg (299 lb 13.2 oz)        RDA Method (mL): 2316  CHO Requirement: 289 g      Nutrition Prescription Ordered    Current Diet Order: Diabetic 2000 kcal    Evaluation of Received Nutrient/Fluid Intake    Comments: LBM 12/09  % Intake of Estimated Energy Needs: 75 - 100 %  % Meal Intake: 75 - 100 %    Nutrition Risk    Level of Risk/Frequency of Follow-up: low     Assessment and Plan  Nutrition Problem  Increased nutrient needs: protein    Related to (etiology):   Wound healing    Signs and Symptoms (as evidenced by):   Pt with osteomyelities s/p left foot amputation    Interventions(treatment strategy):  Collaboration of care with providers.  Modified diet: carb consistent    Nutrition Diagnosis Status:   New       Monitor and Evaluation    Food and Nutrient Intake: energy intake, food and beverage intake  Food and Nutrient Adminstration: diet order  Knowledge/Beliefs/Attitudes: food and nutrition knowledge/skill  Anthropometric Measurements: weight, weight change, body mass index  Biochemical Data, Medical Tests and Procedures: electrolyte and renal panel, gastrointestinal profile, glucose/endocrine profile, inflammatory profile, lipid profile  Nutrition-Focused Physical Findings: overall appearance, extremities, muscles and bones, head and eyes, skin     Malnutrition Assessment                     Restoration Region (Muscle Loss): well nourished  Clavicle Bone Region (Muscle Loss): well nourished  Clavicle and Acromion Bone Region (Muscle Loss): well nourished  Scapular Bone Region (Muscle Loss): well nourished  Dorsal Hand (Muscle Loss): well nourished  Patellar Region (Muscle Loss): well nourished  Anterior Thigh Region (Muscle Loss): well nourished  Posterior Calf Region (Muscle Loss): well  nourished   Edema (Fluid Accumulation): 2-->mild(Pt holding some fluid in lower body)             Nutrition Follow-Up    RD Follow-up?: Yes

## 2020-12-10 NOTE — PLAN OF CARE
Problem: Adult Inpatient Plan of Care  Goal: Plan of Care Review  Outcome: Ongoing, Progressing   Recommendations     Recommendation/Intervention:   1. Continue Diabetic 2000 kcal diet.  2. Add Kvng TID.   3. Add Beneprotein TID.     Goals: 1. Pt's intake meals >75% x 7 days.  Nutrition Goal Status: new  Communication of RD Recs: reviewed with physician

## 2020-12-10 NOTE — PROGRESS NOTES
[facility]  Infectious Disease  Progress Note    Patient Name: Saji Castañeda  MRN: [id number]  Admission Date: 12/8/2020  Length of Stay: 2 days  Attending Physician: Kun Shook MD  Primary Care Provider: Lalo Heath MD    Isolation Status: No active isolations  Assessment/Plan:      * Osteomyelitis  Saji Castañeda is a 70 y.o. male with DMII, HLD presents to ED with worsening wounds of his bilateral lower extremities. ID consulted for osteo of left foot.     MRI tib/fib: Increased STIR signal within the osseous structures of the remaining forefoot is suspected to reflect that of artifact given motion versus surgical change.  No associated low T1 signal to suggest definitively changes of osteomyelitis    Seen by podiatry, underwent bone biopsy 12/9. Gram stain +GNR. Aerobic/anaerobic cultures pending. Wound cultures +proteus. Was on vancomycin and zosyn and is now on vanc/cefepime/metrondiazole. Pt remained afebrile, stable. No leukocytosis.     Recommendations  1. Continue vancomycin. Trough goal 15-20. continue cefepime and metronidazole  2. Will adjust abx accordingly  3. Vascular surgery following, possible angiogram Monday pending vascular studies.  4. Anticipate long term abx therapy for osteomyelitis. recommend snf/ltac placement if able. Pt agreeable.   4. Continue judicial leg elevation.   5. ID will follow                Thank you for your consult. I will follow-up with patient. Please contact us if you have any additional questions.    Raheem White PA-C  Infectious Disease  [facility]    Subjective:     Principal Problem:Osteomyelitis    HPI: Saji Castañeda is a 70 y.o. male who  has a past medical history of Arthritis, Diabetes mellitus, Diabetes mellitus, type 2, Hyperlipidemia, and Osteomyelitis.     Patient admitted for worsening and nonhealing right and left lower extremity wounds.  The left is worse than right.  He follows with Podiatry at [facility].  He had been on antibiotics for the last 2 weeks with worsening of the wounds.  The was prompted to come to the ED for evaluation.  He denies any fever, nausea, vomiting or chills.  He admits to pain to bilateral lower extremities that is worse at night before going to sleep.  He reports the pain is a sharp and shooting pain.  He reports history of ambulation but has not been ambulatory since he developed the heel wound on the left lower extremity.    Per podiatry, Patient has bilateral lower extremity venous stasis ulcerations.  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  The anterior wound has exposed bone with likely osteomyelitis.     Interval History:   Vascular surgery following, possible angiogram Monday pending vascular studies  Wound cultures +proteus  Bone cultures gram stain +GNR  Aerobic cultures pending  Blood cultures NGTD  Remained afebrile, stable, no acute complaints or concerns.     Review of Systems   Constitutional: Negative for activity change, appetite change, chills, diaphoresis, fatigue and fever.   Respiratory: Negative for cough and shortness of breath.    Cardiovascular: Negative for chest pain, palpitations and leg swelling.   Gastrointestinal: Negative for abdominal pain, diarrhea, nausea and vomiting.   Genitourinary: Negative for dysuria.   Musculoskeletal: Negative for back pain.   Skin: Positive for wound. Negative for color change and pallor.   Neurological: Negative for dizziness and headaches.   All other systems reviewed and are negative.    Objective:     Vital Signs (Most Recent):  Temp: 98.6 °F (37 °C) (12/10/20 0856)  Pulse: 63 (12/10/20 1000)  Resp: 19 (12/10/20 0859)  BP: (!) 188/82 (12/10/20 0856)  SpO2: 98 % (12/10/20 0856) Vital Signs (24h Range):  Temp:  [96.2 °F (35.7 °C)-99.1 °F (37.3 °C)] 98.6 °F (37 °C)  Pulse:  [] 63  Resp:  [18-20] 19  SpO2:  [92 %-98 %] 98 %  BP: (127-206)/(61-84)  188/82     Weight: 136 kg (299 lb 13.2 oz)  Body mass index is 44.28 kg/m².    Estimated Creatinine Clearance: 77.3 mL/min (based on SCr of 1.2 mg/dL).    Physical Exam  Vitals signs and nursing note reviewed.   Constitutional:       General: He is not in acute distress.     Appearance: Normal appearance. He is not ill-appearing, toxic-appearing or diaphoretic.   HENT:      Head: Normocephalic.      Nose: Nose normal.   Eyes:      Pupils: Pupils are equal, round, and reactive to light.   Cardiovascular:      Rate and Rhythm: Normal rate and regular rhythm.   Pulmonary:      Effort: Pulmonary effort is normal. No respiratory distress.      Breath sounds: Normal breath sounds. No stridor.   Abdominal:      General: Abdomen is flat. There is no distension.      Palpations: Abdomen is soft. There is no mass.      Tenderness: There is no abdominal tenderness.      Hernia: No hernia is present.   Musculoskeletal:         General: Tenderness present. No deformity or signs of injury.      Left lower leg: Edema present.   Skin:     General: Skin is warm and dry.      Coloration: Skin is not jaundiced or pale.      Comments: BLE wrapped at this time. Refer to media   Neurological:      General: No focal deficit present.      Mental Status: He is alert and oriented to person, place, and time.   Psychiatric:         Mood and Affect: Mood normal.         Significant Labs: All pertinent labs within the past 24 hours have been reviewed.    Significant Imaging: I have reviewed all pertinent imaging results/findings within the past 24 hours.

## 2020-12-10 NOTE — PROGRESS NOTES
"VASCULAR SURGERY  Afternoon Progress Note    Assessment/Plan: 70 year old male with chronic stasis ulcers.  US reviewed. Waveforms are at least biphasic throughout left lower extremity.  Appears to be purely venous in nature    -Recommend elevation of bilateral extremities and compression stockings 40mmHg  -Recommend good local wound care and regular appointments with wound care outpatient.    -No urgent vascular intervention planned.  - CTA ordered    Subjective:  - No changes since previous exam this morning    Objective:  BP (!) 129/58 (BP Location: Right arm, Patient Position: Sitting)   Pulse 93   Temp 99.3 °F (37.4 °C) (Oral)   Resp 18   Ht 5' 9" (1.753 m)   Wt 136 kg (299 lb 13.2 oz)   SpO2 95%   BMI 44.28 kg/m²   - Resting comfortably  - No changes in physical exam since previous visit  - Reminded to elevate legs    Jori Solis MD  PGY - 1  [facility]  "

## 2020-12-10 NOTE — CONSULTS
Consult received per MD for left lower extremity.    Pt was admitted on 12/8/20 with Osteomyelitis and non healing leg wounds. Pt has a PMHx of DM2 insulin deendent, HLP, HTN and peripheral venouse stasis, PVD and prior left foot osteomyelitis requiring amputation presenting with 2 weeks of worsening wounds to left LE - particular issue with heel wound and anterior wound a the stump site. Chart reflects that bone is exposed on the anterior wound. Patient to admitted for osteomyelitis evaluation.    Podiatry also consulted. Pt was seen by Dr. Canas with Podiatry 12/9/20. Per Dr. Canas's progress note wound was debrided with cultures taken. Nursing to continue local wound care as ordered. Podiatry will follow.    Due to Podiatry following pt, wound care will sign off at this time. Please reconsult if further assistance is needed. Nursing to defer to Podiatry for questions regarding pt's care.

## 2020-12-10 NOTE — PLAN OF CARE
12/10/20 1538   Post-Acute Status   Post-Acute Authorization Placement   Post-Acute Placement Status Awaiting Internal Medical Clearance     Sw sent LTAC referrals as Pt was agreeable for abx and wound care, Sw to follow, RICHARD, Reyna, Yovanny and Tarik consulted.

## 2020-12-10 NOTE — PLAN OF CARE
CM assessment complete, patient AAOX4 and participated in interview at bedside. Pt states he lives with his two sisters. He transfers to his own wheelchair (at bedside) and has other DME for safe ambulation.  He uses [facility] for inclusive health services. Pt states he received HH recently but doesn't remember the name of the agency. He states his correct PCP is Dr. Holland. CM will make changes to demographic section and will cont to follow.    Admit DX: Type II diabetes mellitus with complication, uncontrolled [E11.8, E11.65]  Foot ulcer [L97.509]  Type 2 diabetes, uncontrolled, with neuropathy [E11.40, E11.65]  Leg wound, left [S81.802A]  Pain of left lower leg [M79.662]  Open wound of left foot with complication, initial encounter [S91.302A]  Osteomyelitis, unspecified site, unspecified type [M86.9]    Patient Care Team:  Patient Payor:Payor: MEDICAID / Plan: MEDICAID OF [place] / Product Type: Government /     Patient Pharmacy:  [id number] - [place] - [facility] [address] [facility]  [place] [address] [place] [address]  Phone: [phone] Fax: [phone]     12/10/20 1014   Discharge Assessment   Assessment Type Discharge Planning Reassessment   Confirmed/corrected address and phone number on facesheet? Yes   Assessment information obtained from? Patient   Expected Length of Stay (days) 4   Communicated expected length of stay with patient/caregiver yes   Prior to hospitilization cognitive status: Alert/Oriented   Prior to hospitalization functional status: Assistive Equipment   Current cognitive status: Alert/Oriented   Current Functional Status: Assistive Equipment   Facility Arrived From: home   Lives With sibling(s)   Able to Return to Prior Arrangements   (TBD)   Is patient able to care for self after discharge? Unable to determine at this time (comments)   Who are your caregiver(s) and their phone number(s)? Kandy Curtisner (sister) (  [phone])   Patient's perception of discharge disposition home health   Readmission Within the Last 30 Days no previous admission in last 30 days   Patient currently being followed by outpatient case management? No   Patient currently receives any other outside agency services? Yes   Name and contact number of agency or person providing outside services [facility]   Is it the patient/care giver preference to resume care with the current outside agency? Yes   Equipment Currently Used at Home walker, rolling;bedside commode;wheelchair   Do you have any problems affording any of your prescribed medications? No   Is the patient taking medications as prescribed? yes   Does the patient have transportation home? Yes   Transportation Anticipated family or friend will provide;health plan transportation   Does the patient receive services at the [facility]? No   Discharge Plan A Home;Home Health   Discharge Plan B Skilled Nursing Facility   DME Needed Upon Discharge    (TBD)   Patient/Family in Agreement with Plan yes   Cherrie Wallace, MSN  Case Management  Ext [phone]

## 2020-12-10 NOTE — ASSESSMENT & PLAN NOTE
Saji Castañeda is a 70 y.o. male with DMII, HLD presents to ED with worsening wounds of his bilateral lower extremities. ID consulted for osteo of left foot.     MRI tib/fib: Increased STIR signal within the osseous structures of the remaining forefoot is suspected to reflect that of artifact given motion versus surgical change.  No associated low T1 signal to suggest definitively changes of osteomyelitis    Seen by podiatry, underwent bone biopsy 12/9. Gram stain +GNR. Aerobic/anaerobic cultures pending. Wound cultures +proteus. Was on vancomycin and zosyn and is now on vanc/cefepime/metrondiazole. Pt remained afebrile, stable. No leukocytosis.     Recommendations  1. Continue vancomycin. Trough goal 15-20. continue cefepime and metronidazole  2. Will adjust abx accordingly  3. Vascular surgery following, possible angiogram Monday pending vascular studies.  4. Anticipate long term abx therapy for osteomyelitis. recommend snf/ltac placement if able. Pt agreeable.   4. Continue judicial leg elevation.   5. ID will follow

## 2020-12-11 LAB
ALBUMIN SERPL BCP-MCNC: 2.2 G/DL (ref 3.5–5.2)
ALP SERPL-CCNC: 56 U/L (ref 55–135)
ALT SERPL W/O P-5'-P-CCNC: 11 U/L (ref 10–44)
ANION GAP SERPL CALC-SCNC: 5 MMOL/L (ref 8–16)
AST SERPL-CCNC: 15 U/L (ref 10–40)
BACTERIA SPEC AEROBE CULT: ABNORMAL
BASOPHILS # BLD AUTO: 0.03 K/UL (ref 0–0.2)
BASOPHILS NFR BLD: 0.5 % (ref 0–1.9)
BILIRUB SERPL-MCNC: 0.2 MG/DL (ref 0.1–1)
BUN SERPL-MCNC: 16 MG/DL (ref 8–23)
CALCIUM SERPL-MCNC: 8 MG/DL (ref 8.7–10.5)
CHLORIDE SERPL-SCNC: 101 MMOL/L (ref 95–110)
CO2 SERPL-SCNC: 29 MMOL/L (ref 23–29)
CREAT SERPL-MCNC: 1.3 MG/DL (ref 0.5–1.4)
DIFFERENTIAL METHOD: ABNORMAL
EOSINOPHIL # BLD AUTO: 0.4 K/UL (ref 0–0.5)
EOSINOPHIL NFR BLD: 6.3 % (ref 0–8)
ERYTHROCYTE [DISTWIDTH] IN BLOOD BY AUTOMATED COUNT: 16.9 % (ref 11.5–14.5)
EST. GFR  (AFRICAN AMERICAN): >60 ML/MIN/1.73 M^2
EST. GFR  (NON AFRICAN AMERICAN): 54.9 ML/MIN/1.73 M^2
GLUCOSE SERPL-MCNC: 211 MG/DL (ref 70–110)
HCT VFR BLD AUTO: 29.5 % (ref 40–54)
HGB BLD-MCNC: 9 G/DL (ref 14–18)
IMM GRANULOCYTES # BLD AUTO: 0.01 K/UL (ref 0–0.04)
IMM GRANULOCYTES NFR BLD AUTO: 0.2 % (ref 0–0.5)
LYMPHOCYTES # BLD AUTO: 1 K/UL (ref 1–4.8)
LYMPHOCYTES NFR BLD: 17.3 % (ref 18–48)
MCH RBC QN AUTO: 25.3 PG (ref 27–31)
MCHC RBC AUTO-ENTMCNC: 30.5 G/DL (ref 32–36)
MCV RBC AUTO: 83 FL (ref 82–98)
MONOCYTES # BLD AUTO: 0.7 K/UL (ref 0.3–1)
MONOCYTES NFR BLD: 11.9 % (ref 4–15)
NEUTROPHILS # BLD AUTO: 3.7 K/UL (ref 1.8–7.7)
NEUTROPHILS NFR BLD: 63.8 % (ref 38–73)
NRBC BLD-RTO: 0 /100 WBC
PLATELET # BLD AUTO: 321 K/UL (ref 150–350)
PMV BLD AUTO: 10.1 FL (ref 9.2–12.9)
POCT GLUCOSE: 140 MG/DL (ref 70–110)
POCT GLUCOSE: 175 MG/DL (ref 70–110)
POCT GLUCOSE: 215 MG/DL (ref 70–110)
POCT GLUCOSE: 222 MG/DL (ref 70–110)
POTASSIUM SERPL-SCNC: 4 MMOL/L (ref 3.5–5.1)
PROT SERPL-MCNC: 6.4 G/DL (ref 6–8.4)
RBC # BLD AUTO: 3.56 M/UL (ref 4.6–6.2)
SODIUM SERPL-SCNC: 135 MMOL/L (ref 136–145)
WBC # BLD AUTO: 5.72 K/UL (ref 3.9–12.7)

## 2020-12-11 PROCEDURE — 63600175 PHARM REV CODE 636 W HCPCS: Performed by: HOSPITALIST

## 2020-12-11 PROCEDURE — 25000003 PHARM REV CODE 250: Performed by: HOSPITALIST

## 2020-12-11 PROCEDURE — 99233 PR SUBSEQUENT HOSPITAL CARE,LEVL III: ICD-10-PCS | Mod: ,,, | Performed by: NURSE PRACTITIONER

## 2020-12-11 PROCEDURE — 99233 SBSQ HOSP IP/OBS HIGH 50: CPT | Mod: ,,, | Performed by: HOSPITALIST

## 2020-12-11 PROCEDURE — 99233 SBSQ HOSP IP/OBS HIGH 50: CPT | Mod: ,,, | Performed by: NURSE PRACTITIONER

## 2020-12-11 PROCEDURE — 97116 GAIT TRAINING THERAPY: CPT

## 2020-12-11 PROCEDURE — 80053 COMPREHEN METABOLIC PANEL: CPT

## 2020-12-11 PROCEDURE — 36415 COLL VENOUS BLD VENIPUNCTURE: CPT

## 2020-12-11 PROCEDURE — 99233 PR SUBSEQUENT HOSPITAL CARE,LEVL III: ICD-10-PCS | Mod: ,,, | Performed by: HOSPITALIST

## 2020-12-11 PROCEDURE — 11000001 HC ACUTE MED/SURG PRIVATE ROOM

## 2020-12-11 PROCEDURE — 25500020 PHARM REV CODE 255: Performed by: HOSPITALIST

## 2020-12-11 PROCEDURE — 63600175 PHARM REV CODE 636 W HCPCS: Performed by: PHYSICIAN ASSISTANT

## 2020-12-11 PROCEDURE — 85025 COMPLETE CBC W/AUTO DIFF WBC: CPT

## 2020-12-11 PROCEDURE — 25000003 PHARM REV CODE 250: Performed by: PHYSICIAN ASSISTANT

## 2020-12-11 RX ORDER — GABAPENTIN 100 MG/1
200 CAPSULE ORAL 2 TIMES DAILY
Status: DISCONTINUED | OUTPATIENT
Start: 2020-12-11 | End: 2020-12-14 | Stop reason: HOSPADM

## 2020-12-11 RX ADMIN — INSULIN ASPART 8 UNITS: 100 INJECTION, SOLUTION INTRAVENOUS; SUBCUTANEOUS at 07:12

## 2020-12-11 RX ADMIN — HYDROCODONE BITARTRATE AND ACETAMINOPHEN 1 TABLET: 5; 325 TABLET ORAL at 02:12

## 2020-12-11 RX ADMIN — METRONIDAZOLE 500 MG: 500 TABLET ORAL at 05:12

## 2020-12-11 RX ADMIN — ZINC SULFATE 220 MG (50 MG) CAPSULE 220 MG: CAPSULE at 08:12

## 2020-12-11 RX ADMIN — ENOXAPARIN SODIUM 40 MG: 40 INJECTION SUBCUTANEOUS at 09:12

## 2020-12-11 RX ADMIN — INSULIN ASPART 1 UNITS: 100 INJECTION, SOLUTION INTRAVENOUS; SUBCUTANEOUS at 10:12

## 2020-12-11 RX ADMIN — INSULIN ASPART 8 UNITS: 100 INJECTION, SOLUTION INTRAVENOUS; SUBCUTANEOUS at 04:12

## 2020-12-11 RX ADMIN — VANCOMYCIN HYDROCHLORIDE 1500 MG: 1.5 INJECTION, POWDER, LYOPHILIZED, FOR SOLUTION INTRAVENOUS at 05:12

## 2020-12-11 RX ADMIN — METRONIDAZOLE 500 MG: 500 TABLET ORAL at 02:12

## 2020-12-11 RX ADMIN — INSULIN ASPART 2 UNITS: 100 INJECTION, SOLUTION INTRAVENOUS; SUBCUTANEOUS at 08:12

## 2020-12-11 RX ADMIN — CEFEPIME 2 G: 1 INJECTION, POWDER, FOR SOLUTION INTRAMUSCULAR; INTRAVENOUS at 12:12

## 2020-12-11 RX ADMIN — ASPIRIN 81 MG CHEWABLE TABLET 81 MG: 81 TABLET CHEWABLE at 08:12

## 2020-12-11 RX ADMIN — TAMSULOSIN HYDROCHLORIDE 0.4 MG: 0.4 CAPSULE ORAL at 08:12

## 2020-12-11 RX ADMIN — ATORVASTATIN CALCIUM 40 MG: 20 TABLET, FILM COATED ORAL at 09:12

## 2020-12-11 RX ADMIN — IOHEXOL 125 ML: 350 INJECTION, SOLUTION INTRAVENOUS at 12:12

## 2020-12-11 RX ADMIN — INSULIN ASPART 8 UNITS: 100 INJECTION, SOLUTION INTRAVENOUS; SUBCUTANEOUS at 12:12

## 2020-12-11 RX ADMIN — HYDRALAZINE HYDROCHLORIDE AND ISOSORBIDE DINITRATE 1 TABLET: 37.5; 2 TABLET, FILM COATED ORAL at 09:12

## 2020-12-11 RX ADMIN — INSULIN DETEMIR 25 UNITS: 100 INJECTION, SOLUTION SUBCUTANEOUS at 10:12

## 2020-12-11 RX ADMIN — CEFEPIME 2 G: 1 INJECTION, POWDER, FOR SOLUTION INTRAMUSCULAR; INTRAVENOUS at 05:12

## 2020-12-11 RX ADMIN — HYDROCODONE BITARTRATE AND ACETAMINOPHEN 1 TABLET: 5; 325 TABLET ORAL at 08:12

## 2020-12-11 RX ADMIN — GABAPENTIN 200 MG: 100 CAPSULE ORAL at 09:12

## 2020-12-11 RX ADMIN — ENOXAPARIN SODIUM 40 MG: 40 INJECTION SUBCUTANEOUS at 08:12

## 2020-12-11 RX ADMIN — LOSARTAN POTASSIUM 50 MG: 50 TABLET, FILM COATED ORAL at 08:12

## 2020-12-11 RX ADMIN — CEFEPIME 2 G: 1 INJECTION, POWDER, FOR SOLUTION INTRAMUSCULAR; INTRAVENOUS at 09:12

## 2020-12-11 RX ADMIN — HYDRALAZINE HYDROCHLORIDE AND ISOSORBIDE DINITRATE 1 TABLET: 37.5; 2 TABLET, FILM COATED ORAL at 02:12

## 2020-12-11 RX ADMIN — HYDRALAZINE HYDROCHLORIDE AND ISOSORBIDE DINITRATE 1 TABLET: 37.5; 2 TABLET, FILM COATED ORAL at 08:12

## 2020-12-11 RX ADMIN — THERA TABS 1 TABLET: TAB at 08:12

## 2020-12-11 RX ADMIN — DOCUSATE SODIUM 50MG AND SENNOSIDES 8.6MG 1 TABLET: 8.6; 5 TABLET, FILM COATED ORAL at 08:12

## 2020-12-11 RX ADMIN — HYDROCODONE BITARTRATE AND ACETAMINOPHEN 1 TABLET: 5; 325 TABLET ORAL at 03:12

## 2020-12-11 NOTE — PLAN OF CARE
Update: Pt is established with [facility]  HH with PCP Dr. Holland will cont to follow.  Cherrie Wallace, MSN  Case Management  Ext [phone]

## 2020-12-11 NOTE — NURSING
Pt is in Afib, HR drops drown to 38-39. Pt asymptomatic. Shaka Michele NP notified. Will cont. To monitor.

## 2020-12-11 NOTE — PROGRESS NOTES
Therapy with vancomycin has been discontinued.  Will sign off, please re-consult, if needed.      Anuja Hernandez, Pharm. D.  Pharmacist   [facility]

## 2020-12-11 NOTE — PLAN OF CARE
12/11/20 0936   Post-Acute Status   Post-Acute Authorization Placement   Post-Acute Placement Status Awaiting Internal Medical Clearance     Sushila Montanez will meet with Pt and follow with medical stability.

## 2020-12-11 NOTE — SUBJECTIVE & OBJECTIVE
Interval History:   Afebrile, no leukocytosis.  Sitting up in wheelchair.  No complaints  Vascular surgery following, possible angiogram Monday pending vascular studies  Wound cultures +proteus  Bone cultures + proteus      Review of Systems   Constitutional: Negative for activity change, appetite change, chills, diaphoresis, fatigue and fever.   Respiratory: Negative for cough and shortness of breath.    Cardiovascular: Negative for chest pain, palpitations and leg swelling.   Gastrointestinal: Negative for abdominal pain, diarrhea, nausea and vomiting.   Genitourinary: Negative for dysuria.   Musculoskeletal: Negative for back pain.   Skin: Positive for wound. Negative for color change and pallor.   Neurological: Negative for dizziness and headaches.   All other systems reviewed and are negative.    Objective:     Vital Signs (Most Recent):  Temp: 98.4 °F (36.9 °C) (12/11/20 1106)  Pulse: 83 (12/11/20 1106)  Resp: 18 (12/11/20 0830)  BP: 133/70 (12/11/20 1106)  SpO2: (!) 94 % (12/11/20 1106) Vital Signs (24h Range):  Temp:  [97.3 °F (36.3 °C)-99.3 °F (37.4 °C)] 98.4 °F (36.9 °C)  Pulse:  [43-93] 83  Resp:  [17-18] 18  SpO2:  [94 %-99 %] 94 %  BP: (129-156)/(58-82) 133/70     Weight: 136 kg (299 lb 13.2 oz)  Body mass index is 44.28 kg/m².    Estimated Creatinine Clearance: 71.4 mL/min (based on SCr of 1.3 mg/dL).    Physical Exam  Vitals signs and nursing note reviewed.   Constitutional:       General: He is not in acute distress.     Appearance: Normal appearance. He is not ill-appearing, toxic-appearing or diaphoretic.   HENT:      Head: Normocephalic.      Nose: Nose normal.   Eyes:      Pupils: Pupils are equal, round, and reactive to light.   Cardiovascular:      Rate and Rhythm: Normal rate and regular rhythm.   Pulmonary:      Effort: Pulmonary effort is normal. No respiratory distress.      Breath sounds: Normal breath sounds. No stridor.   Abdominal:      General: Abdomen is flat. There is no distension.       Palpations: Abdomen is soft. There is no mass.      Tenderness: There is no abdominal tenderness.      Hernia: No hernia is present.   Musculoskeletal:         General: Tenderness present. No deformity or signs of injury.      Right lower leg: Edema present.      Left lower leg: Edema present.   Skin:     General: Skin is warm and dry.      Coloration: Skin is not jaundiced or pale.      Comments: BLE wrapped at this time. Podiatry photos reviewed.  See below.    Neurological:      General: No focal deficit present.      Mental Status: He is alert and oriented to person, place, and time.   Psychiatric:         Mood and Affect: Mood normal.                             Significant Labs: All pertinent labs within the past 24 hours have been reviewed.    Significant Imaging: I have reviewed all pertinent imaging results/findings within the past 24 hours.

## 2020-12-11 NOTE — PT/OT/SLP PROGRESS
Physical Therapy Treatment    Patient Name:  Saji Castañeda   MRN:  [id number]    Recommendations:     Discharge Recommendations:  home health PT   Discharge Equipment Recommendations: none   Barriers to discharge: None    Assessment:     Saji Castañeda is a 70 y.o. male admitted with a medical diagnosis of Osteomyelitis.  He presents with the following impairments/functional limitations:  weakness, impaired functional mobilty, decreased lower extremity function, impaired skin, impaired balance, gait instability, orthopedic precautions. Pt tolerated PT session well with good participation. Pt required CGA-Min a for sit<>stand transfer and amb 9 ft with RW WBAT LLE with min A for balance and RW management. Pt's personal w/c needs brakes adjusted for stronger locking. Pt progressing with improved mobility    Rehab Prognosis: Good; patient would benefit from acute skilled PT services to address these deficits and reach maximum level of function.    Recent Surgery: * No surgery found *      Plan:     During this hospitalization, patient to be seen 2 x/week to address the identified rehab impairments via gait training, therapeutic activities, therapeutic exercises, neuromuscular re-education, wheelchair management/training and progress toward the following goals:    · Plan of Care Expires:  01/08/21    Subjective     Chief Complaint: hole in left foot  Patient/Family Comments/goals: this is the second w/c I've had and it needs davonte work  Pain/Comfort:  · Pain Rating 1: 0/10  · Pain Rating Post-Intervention 1: 0/10      Objective:     Communicated with RN prior to session.  Patient found up in personal w/c with telemetry, peripheral IV(PIV inactive) upon PT entry to room.     General Precautions: Standard, fall   Orthopedic Precautions:LLE weight bearing as tolerated   Braces: N/A     Functional Mobility:  · Transfers:     · Sit to Stand:  contact guard assistance- minimum assistance with rolling walker  · Gait: amb 9 ft with  RW WBAT LLE with min A for balance and RW management ; slow faviola; VC to stay properly positioned within RW  · Balance: CGA for staic standing with RW      AM-PAC 6 CLICK MOBILITY  Turning over in bed (including adjusting bedclothes, sheets and blankets)?: 4  Sitting down on and standing up from a chair with arms (e.g., wheelchair, bedside commode, etc.): 3  Moving from lying on back to sitting on the side of the bed?: 3  Moving to and from a bed to a chair (including a wheelchair)?: 3  Need to walk in hospital room?: 3  Climbing 3-5 steps with a railing?: 1  Basic Mobility Total Score: 17       Therapeutic Activities and Exercises:   Patient educated in:  -safety with transfers including hand placement  -gait sequencing and RW management  -OOB activity to maximize recovery   -pt performed 3 sets of 10 reps in sitting: LAQ, hip flex, GS      Patient left in w/c with call button in reach and RN present to give meds..    GOALS:   Multidisciplinary Problems     Physical Therapy Goals        Problem: Physical Therapy Goal    Goal Priority Disciplines Outcome Goal Variances Interventions   Physical Therapy Goal     PT, PT/OT Ongoing, Progressing     Description: Goals to be met by: 20     Patient will increase functional independence with mobility by performin. Supine to sit with Modified [place] - Not met  2. Sit to stand transfer with Supervision using RW - Not met  3. Gait  x 20 feet with Contact Guard Assistance using Rolling Walker - Not met  4. Wheelchair propulsion x 100 feet with Supervision using bilateral upper and lower extremities - Not met  5. Stand for 5 minutes with Stand-by Assistance using Rolling Walker or UE support surface as needed - Not met                   Time Tracking:     PT Received On: 20  PT Start Time:      PT Stop Time: 1612  PT Total Time (min): 16 min     Billable Minutes: Gait Training 16    Treatment Type: Treatment  PT/PTA: PT     PTA Visit Number: 0      Anuja Garcia, PT  12/11/2020

## 2020-12-11 NOTE — PROGRESS NOTES
[facility] Hospital Medicine  Progress Note    Patient Name: Saji Castañeda  MRN: [id number]  Patient Class: IP- Inpatient   Admission Date: 12/8/2020  Length of Stay: 2 days  Attending Physician: Kun Shook MD  Primary Care Provider: Lalo Heath MD    [facility] [place] [facility] [place] [facility] [place] Kun Shook [place]    Subjective:     Principal Problem:Osteomyelitis    Interval History: assumed care of patient    Seen at bedside  Reports bilateral legs hurting today  Pain medication is helping to control pain    S/p Bone biopsy with podiatry yesterday     Urinating normally, having Bm, appetite is stable.     Prior to admit pt was using a prosthetic for left lower leg.     Review of Systems   Constitutional: Negative for appetite change and fever.   Respiratory: Negative for chest tightness and shortness of breath.    Cardiovascular: Negative for chest pain.   Gastrointestinal: Negative for constipation.   Genitourinary: Negative for dysuria.     Objective:     Vital Signs (Most Recent):  Temp: 99.3 °F (37.4 °C) (12/10/20 1158)  Pulse: 93 (12/10/20 1546)  Resp: 18 (12/10/20 1547)  BP: (!) 129/58 (12/10/20 1546)  SpO2: 95 % (12/10/20 1158) Vital Signs (24h Range):  Temp:  [96.2 °F (35.7 °C)-99.3 °F (37.4 °C)] 99.3 °F (37.4 °C)  Pulse:  [] 93  Resp:  [17-20] 18  SpO2:  [92 %-98 %] 95 %  BP: (127-196)/(58-84) 129/58   Intake/[place]:  This Shift:  No intake/output data recorded.    Net I/O past 24h:     Intake/Output Summary (Last 24 hours) at 12/10/2020 1823  Last data filed at 12/10/2020 0600  Gross per 24 hour   Intake 120 ml   Output --   Net 120 ml         Weight: 136 kg (299 lb 13.2 oz)  Body mass index is 44.28 kg/m².  Physical Exam  Constitutional:       General: He is not in acute distress.     Appearance: He is obese. He is not toxic-appearing.   Cardiovascular:      Rate and Rhythm: Normal rate and regular rhythm.      Pulses: Normal pulses.      Heart sounds: No murmur.    Pulmonary:      Effort: Pulmonary effort is normal.      Breath sounds: No rales.   Abdominal:      General: Abdomen is flat. There is no distension.      Palpations: Abdomen is soft.      Tenderness: There is no abdominal tenderness.   Musculoskeletal:      Comments: S/p left BKA, distal end of leg in dressing  RLE/Foot dry skin, shin in wound dressing    Skin:     General: Skin is dry.   Neurological:      Mental Status: He is alert and oriented to person, place, and time.           Significant Labs:   CBC:   Recent Labs   Lab 12/09/20 0355 12/10/20  0513   WBC 7.90 6.40   HGB 9.5* 10.4*   HCT 31.0* 33.3*    257     CMP:   Recent Labs   Lab 12/09/20  0355 12/10/20  0513    136   K 3.8 4.2    102   CO2 28 24   * 121*   BUN 16 14   CREATININE 1.2 1.2   CALCIUM 8.6* 8.5*   PROT 6.8 7.1   ALBUMIN 2.3* 2.4*   BILITOT 0.3 0.3   ALKPHOS 70 64   AST 17 21   ALT 13 14   ANIONGAP 8 10   EGFRNONAA >60.0 >60.0       Significant Imaging: I have reviewed all pertinent imaging results/findings within the past 24 hours.    MEDICATIONS  Scheduled Meds:   aspirin  81 mg Oral Daily    atorvastatin  40 mg Oral QHS    ceFEPime (MAXIPIME) IVPB  2 g Intravenous Q8H    enoxaparin  40 mg Subcutaneous Q12H    insulin aspart U-100  8 Units Subcutaneous TIDWM    insulin detemir U-100  25 Units Subcutaneous QHS    isosorbide-hydrALAZINE 20-37.5 mg  1 tablet Oral TID    losartan  50 mg Oral Daily    metroNIDAZOLE  500 mg Oral Q8H    multivitamin  1 tablet Oral Daily    senna-docusate 8.6-50 mg  1 tablet Oral Daily    tamsulosin  0.4 mg Oral Daily    vancomycin (VANCOCIN) IVPB  1,500 mg Intravenous Q12H    zinc sulfate  220 mg Oral Daily                             Continuous Infusions:  PRN Meds:.acetaminophen, bisacodyL, dextrose 50%, dextrose 50%, glucagon (human recombinant), glucose, glucose, hydrALAZINE, HYDROcodone-acetaminophen, insulin aspart U-100, melatonin, morphine, ondansetron, sodium  chloride 0.9%, Pharmacy to dose Vancomycin consult **AND** vancomycin - pharmacy to dose    VTE Risk Mitigation (From admission, onward)         Ordered     enoxaparin injection 40 mg  Every 12 hours      12/10/20 1027     IP VTE HIGH RISK PATIENT  Once      12/08/20 1629     Place sequential compression device  Until discontinued      12/08/20 1629                Assessment/Plan:     Overview/Hospital Course:     69 y/o AAM hx of Type 2 DM, HLD, PAD s/p Left BKA who is admitted with bilateral LE wounds and concern for LLE osteomyelitis secondary poorly healing wounds/ulcers.     1. Acute osteomyelitis   -s/p Bone Marrow Biopsy LLE (12/09)  - on Empiric vancomycin, Cefepime, Flagyl pending biopsy & bone culture results.   - Podiatry consults following, managing wound care to Left leg and RLE    2. Peripheral arterial disease   -vascular surg recs 3 pillow elevation of legs at all times  -vascular lab studies 12/11 and potential angiography on Monday 12/14.     3. Type 2 Diabetes Mellitus, uncontrolled w/neuropathy   -continue aspart 8 units TID and basal insulin 25 units nightly  -well controlled glucose levels at this time.   -A1c 7.9 @ admission.     4. Essential Hypertension   -continue on BIDIL, Losartan      5. BPH   -continue on home flomax.         Code Status: Prior    High Risk Conditions:  Patient has a condition that poses threat to life and bodily function: Acute Osteomyelitis  Patient is currently on drug therapy requiring intensive monitoring for toxicity: Vancomycin    Discharge Planning   OXANA: 12/15/2020     Code Status: Prior   Is the patient medically ready for discharge?: No    Reason for patient still in hospital (select all that apply): Patient trending condition, Laboratory test, Treatment, Imaging and Consult recommendations  Discharge Plan A: Home, Home Health          Active Hospital Problems    Diagnosis  POA    *Osteomyelitis [M86.9]  Yes     Priority: 1 - High    Peripheral arterial  disease [I73.9]  Yes     Priority: 2      Chronic    Wound, open, foot with complication [S91.309A]  Yes     Priority: 2     Type 2 diabetes, uncontrolled, with neuropathy [E11.40, E11.65]  Yes     Priority: 3      Chronic    Diabetic neuropathy [E11.40]  Yes     Priority: 4      Chronic    Edema of leg [R60.0]  Yes     Priority: 5      Chronic    Mobitz (type) I (Wenckebach's) atrioventricular block [I44.1]  Yes     Priority: 6     Essential hypertension [I10]  Yes     Chronic      Resolved Hospital Problems   No resolved problems to display.                 Kun Shook M.D.  Attending Physician  [facility] Medicine Dept.  Pager: [phone]  [facility]  [id number]

## 2020-12-11 NOTE — PLAN OF CARE
Problem: Physical Therapy Goal  Goal: Physical Therapy Goal  Description: Goals to be met by: 20     Patient will increase functional independence with mobility by performin. Supine to sit with Modified [place] - Not met  2. Sit to stand transfer with Supervision using RW - Not met  3. Gait  x 20 feet with Contact Guard Assistance using Rolling Walker - Not met  4. Wheelchair propulsion x 100 feet with Supervision using bilateral upper and lower extremities - Not met  5. Stand for 5 minutes with Stand-by Assistance using Rolling Walker or UE support surface as needed - Not met  Outcome: Ongoing, Progressing   Goals reviewed and remain appropriate.  Anuja Garcia PT  2020

## 2020-12-12 LAB
ALBUMIN SERPL BCP-MCNC: 2.1 G/DL (ref 3.5–5.2)
ALP SERPL-CCNC: 56 U/L (ref 55–135)
ALT SERPL W/O P-5'-P-CCNC: 15 U/L (ref 10–44)
ANION GAP SERPL CALC-SCNC: 8 MMOL/L (ref 8–16)
AST SERPL-CCNC: 21 U/L (ref 10–40)
BASOPHILS # BLD AUTO: 0.03 K/UL (ref 0–0.2)
BASOPHILS NFR BLD: 0.5 % (ref 0–1.9)
BILIRUB SERPL-MCNC: 0.3 MG/DL (ref 0.1–1)
BUN SERPL-MCNC: 15 MG/DL (ref 8–23)
CALCIUM SERPL-MCNC: 8.3 MG/DL (ref 8.7–10.5)
CHLORIDE SERPL-SCNC: 104 MMOL/L (ref 95–110)
CO2 SERPL-SCNC: 26 MMOL/L (ref 23–29)
CREAT SERPL-MCNC: 1.2 MG/DL (ref 0.5–1.4)
DIFFERENTIAL METHOD: ABNORMAL
EOSINOPHIL # BLD AUTO: 0.5 K/UL (ref 0–0.5)
EOSINOPHIL NFR BLD: 8.2 % (ref 0–8)
ERYTHROCYTE [DISTWIDTH] IN BLOOD BY AUTOMATED COUNT: 17.1 % (ref 11.5–14.5)
EST. GFR  (AFRICAN AMERICAN): >60 ML/MIN/1.73 M^2
EST. GFR  (NON AFRICAN AMERICAN): >60 ML/MIN/1.73 M^2
GLUCOSE SERPL-MCNC: 170 MG/DL (ref 70–110)
HCT VFR BLD AUTO: 31.1 % (ref 40–54)
HGB BLD-MCNC: 9.2 G/DL (ref 14–18)
IMM GRANULOCYTES # BLD AUTO: 0.02 K/UL (ref 0–0.04)
IMM GRANULOCYTES NFR BLD AUTO: 0.3 % (ref 0–0.5)
LYMPHOCYTES # BLD AUTO: 1 K/UL (ref 1–4.8)
LYMPHOCYTES NFR BLD: 15.8 % (ref 18–48)
MCH RBC QN AUTO: 25.2 PG (ref 27–31)
MCHC RBC AUTO-ENTMCNC: 29.6 G/DL (ref 32–36)
MCV RBC AUTO: 85 FL (ref 82–98)
MONOCYTES # BLD AUTO: 0.7 K/UL (ref 0.3–1)
MONOCYTES NFR BLD: 11 % (ref 4–15)
NEUTROPHILS # BLD AUTO: 4 K/UL (ref 1.8–7.7)
NEUTROPHILS NFR BLD: 64.2 % (ref 38–73)
NRBC BLD-RTO: 0 /100 WBC
PLATELET # BLD AUTO: 332 K/UL (ref 150–350)
PMV BLD AUTO: 9.9 FL (ref 9.2–12.9)
POCT GLUCOSE: 153 MG/DL (ref 70–110)
POCT GLUCOSE: 213 MG/DL (ref 70–110)
POCT GLUCOSE: 249 MG/DL (ref 70–110)
POCT GLUCOSE: 251 MG/DL (ref 70–110)
POTASSIUM SERPL-SCNC: 3.9 MMOL/L (ref 3.5–5.1)
PROT SERPL-MCNC: 6.3 G/DL (ref 6–8.4)
RBC # BLD AUTO: 3.65 M/UL (ref 4.6–6.2)
SODIUM SERPL-SCNC: 138 MMOL/L (ref 136–145)
TB INDURATION 48 - 72 HR READ: 72 MM
WBC # BLD AUTO: 6.19 K/UL (ref 3.9–12.7)

## 2020-12-12 PROCEDURE — 63600175 PHARM REV CODE 636 W HCPCS: Performed by: HOSPITALIST

## 2020-12-12 PROCEDURE — 85025 COMPLETE CBC W/AUTO DIFF WBC: CPT

## 2020-12-12 PROCEDURE — 25000003 PHARM REV CODE 250: Performed by: HOSPITALIST

## 2020-12-12 PROCEDURE — 80053 COMPREHEN METABOLIC PANEL: CPT

## 2020-12-12 PROCEDURE — 99232 SBSQ HOSP IP/OBS MODERATE 35: CPT | Mod: ,,, | Performed by: HOSPITALIST

## 2020-12-12 PROCEDURE — 99232 PR SUBSEQUENT HOSPITAL CARE,LEVL II: ICD-10-PCS | Mod: ,,, | Performed by: HOSPITALIST

## 2020-12-12 PROCEDURE — 36415 COLL VENOUS BLD VENIPUNCTURE: CPT

## 2020-12-12 PROCEDURE — 63600175 PHARM REV CODE 636 W HCPCS: Performed by: PHYSICIAN ASSISTANT

## 2020-12-12 PROCEDURE — 11000001 HC ACUTE MED/SURG PRIVATE ROOM

## 2020-12-12 RX ADMIN — INSULIN ASPART 3 UNITS: 100 INJECTION, SOLUTION INTRAVENOUS; SUBCUTANEOUS at 05:12

## 2020-12-12 RX ADMIN — THERA TABS 1 TABLET: TAB at 08:12

## 2020-12-12 RX ADMIN — TAMSULOSIN HYDROCHLORIDE 0.4 MG: 0.4 CAPSULE ORAL at 08:12

## 2020-12-12 RX ADMIN — GABAPENTIN 200 MG: 100 CAPSULE ORAL at 08:12

## 2020-12-12 RX ADMIN — INSULIN ASPART 8 UNITS: 100 INJECTION, SOLUTION INTRAVENOUS; SUBCUTANEOUS at 05:12

## 2020-12-12 RX ADMIN — ENOXAPARIN SODIUM 40 MG: 40 INJECTION SUBCUTANEOUS at 08:12

## 2020-12-12 RX ADMIN — CEFEPIME 2 G: 1 INJECTION, POWDER, FOR SOLUTION INTRAMUSCULAR; INTRAVENOUS at 03:12

## 2020-12-12 RX ADMIN — HYDROCODONE BITARTRATE AND ACETAMINOPHEN 1 TABLET: 5; 325 TABLET ORAL at 04:12

## 2020-12-12 RX ADMIN — HYDRALAZINE HYDROCHLORIDE AND ISOSORBIDE DINITRATE 1 TABLET: 37.5; 2 TABLET, FILM COATED ORAL at 02:12

## 2020-12-12 RX ADMIN — ASPIRIN 81 MG CHEWABLE TABLET 81 MG: 81 TABLET CHEWABLE at 08:12

## 2020-12-12 RX ADMIN — ZINC SULFATE 220 MG (50 MG) CAPSULE 220 MG: CAPSULE at 08:12

## 2020-12-12 RX ADMIN — INSULIN ASPART 8 UNITS: 100 INJECTION, SOLUTION INTRAVENOUS; SUBCUTANEOUS at 08:12

## 2020-12-12 RX ADMIN — HYDRALAZINE HYDROCHLORIDE AND ISOSORBIDE DINITRATE 1 TABLET: 37.5; 2 TABLET, FILM COATED ORAL at 08:12

## 2020-12-12 RX ADMIN — INSULIN ASPART 1 UNITS: 100 INJECTION, SOLUTION INTRAVENOUS; SUBCUTANEOUS at 08:12

## 2020-12-12 RX ADMIN — INSULIN ASPART 8 UNITS: 100 INJECTION, SOLUTION INTRAVENOUS; SUBCUTANEOUS at 12:12

## 2020-12-12 RX ADMIN — INSULIN DETEMIR 25 UNITS: 100 INJECTION, SOLUTION SUBCUTANEOUS at 08:12

## 2020-12-12 RX ADMIN — ATORVASTATIN CALCIUM 40 MG: 20 TABLET, FILM COATED ORAL at 08:12

## 2020-12-12 RX ADMIN — CEFEPIME 2 G: 1 INJECTION, POWDER, FOR SOLUTION INTRAMUSCULAR; INTRAVENOUS at 11:12

## 2020-12-12 RX ADMIN — INSULIN ASPART 2 UNITS: 100 INJECTION, SOLUTION INTRAVENOUS; SUBCUTANEOUS at 12:12

## 2020-12-12 RX ADMIN — LOSARTAN POTASSIUM 50 MG: 50 TABLET, FILM COATED ORAL at 08:12

## 2020-12-12 RX ADMIN — CEFEPIME 2 G: 1 INJECTION, POWDER, FOR SOLUTION INTRAMUSCULAR; INTRAVENOUS at 09:12

## 2020-12-12 NOTE — ASSESSMENT & PLAN NOTE
70 y.o. 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 ID consulted for osteo of left foot.      Per Podiatry he has anterior and posterior wound that are both fairly deep.  The anterior wound has exposed bone with likely osteomyelitis.  An MRI tib/fib inconclusive.  He underwent   bone biopsy on  12/9.   Wound gram stain with rare GPC.  Bone gram stain negative.  Both wound and bone cultures + for Proteus - bone susceptibilities pending.  Currently on vanc/cefepime/metrondiazole.  Vascular surgery is following.  Possible angiogram on Monday pending vascular studies.  Afebrile, stable. No leukocytosis.     Recommendations  1. Discontinue IV vancomycin and flagyl.   2. Continue cefepime for now pending confirmation that bone susceptibilities are same as wound.  If similar, will de-escalate to cefazolin IV.   3. Anticipate 6 weeks of IV antibiotics for osteomyelitis.   4. Will need weekly cbc, cmp, crp  5. Recommend snf/ltac placement if able. Pt agreeable.   6. Leg elevation.   7. ID will follow  cultures from [place] over the weekend and will adjust antibiotics accordingly  8. Will follow up Monday with final recommendations.     For any questions or concerns over the weekend, please call JOHNNY Carter at [phone] during daytime hours or page ID staff on call.     Data reviewed and plan discussed with ID staff  Discussed with Primary Team

## 2020-12-12 NOTE — PROGRESS NOTES
[facility]  Infectious Disease  Progress Note    Patient Name: Saji Castañeda  MRN: [id number]  Admission Date: 12/8/2020  Length of Stay: 3 days  Attending Physician: Kun Shook MD  Primary Care Provider: Lalo Heath MD    Isolation Status: No active isolations  Assessment/Plan:      * Osteomyelitis     70 y.o. 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 ID consulted for osteo of left foot.      Per Podiatry he has anterior and posterior wound that are both fairly deep.  The anterior wound has exposed bone with likely osteomyelitis.  An MRI tib/fib inconclusive.  He underwent   bone biopsy on  12/9.   Wound gram stain with rare GPC.  Bone gram stain negative.  Both wound and bone cultures + for Proteus - bone susceptibilities pending.  Currently on vanc/cefepime/metrondiazole.  Vascular surgery is following.  Possible angiogram on Monday pending vascular studies.  Afebrile, stable. No leukocytosis.     Recommendations  1. Discontinue IV vancomycin and flagyl.   2. Continue cefepime for now pending confirmation that bone susceptibilities are same as wound.  If similar, will de-escalate to cefazolin IV.   3. Anticipate 6 weeks of IV antibiotics for osteomyelitis.   4. Will need weekly cbc, cmp, crp  5. Recommend snf/ltac placement if able. Pt agreeable.   6. Leg elevation.   7. ID will follow  cultures from [place] over the weekend and will adjust antibiotics accordingly  8. Will follow up Monday with final recommendations.     For any questions or concerns over the weekend, please call JOHNNY Carter at [phone] during daytime hours or page ID staff on call.     Data reviewed and plan discussed with ID staff  Discussed with Primary Team            Thank you.   Please call for any questions or concerns.  ANIYAH Maloney, ANP-C  [phone] pager, [place] [phone]    Subjective:      Principal Problem:Osteomyelitis    HPI: Saji Castañeda is a 70 y.o. male who  has a past medical history of Arthritis, Diabetes mellitus, Diabetes mellitus, type 2, Hyperlipidemia, and Osteomyelitis.     Patient admitted for worsening and nonhealing right and left lower extremity wounds.  The left is worse than right.  He follows with Podiatry at [facility].  He had been on antibiotics for the last 2 weeks with worsening of the wounds.  The was prompted to come to the ED for evaluation.  He denies any fever, nausea, vomiting or chills.  He admits to pain to bilateral lower extremities that is worse at night before going to sleep.  He reports the pain is a sharp and shooting pain.  He reports history of ambulation but has not been ambulatory since he developed the heel wound on the left lower extremity.    Per podiatry, Patient has bilateral lower extremity venous stasis ulcerations.  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  The anterior wound has exposed bone with likely osteomyelitis.     Interval History:   Afebrile, no leukocytosis.  Sitting up in wheelchair.  No complaints  Vascular surgery following, possible angiogram Monday pending vascular studies  Wound cultures +proteus  Bone cultures + proteus      Review of Systems   Constitutional: Negative for activity change, appetite change, chills, diaphoresis, fatigue and fever.   Respiratory: Negative for cough and shortness of breath.    Cardiovascular: Negative for chest pain, palpitations and leg swelling.   Gastrointestinal: Negative for abdominal pain, diarrhea, nausea and vomiting.   Genitourinary: Negative for dysuria.   Musculoskeletal: Negative for back pain.   Skin: Positive for wound. Negative for color change and pallor.   Neurological: Negative for dizziness and headaches.   All other systems reviewed and are negative.    Objective:     Vital Signs (Most Recent):  Temp: 98.4 °F  (36.9 °C) (12/11/20 1106)  Pulse: 83 (12/11/20 1106)  Resp: 18 (12/11/20 0830)  BP: 133/70 (12/11/20 1106)  SpO2: (!) 94 % (12/11/20 1106) Vital Signs (24h Range):  Temp:  [97.3 °F (36.3 °C)-99.3 °F (37.4 °C)] 98.4 °F (36.9 °C)  Pulse:  [43-93] 83  Resp:  [17-18] 18  SpO2:  [94 %-99 %] 94 %  BP: (129-156)/(58-82) 133/70     Weight: 136 kg (299 lb 13.2 oz)  Body mass index is 44.28 kg/m².    Estimated Creatinine Clearance: 71.4 mL/min (based on SCr of 1.3 mg/dL).    Physical Exam  Vitals signs and nursing note reviewed.   Constitutional:       General: He is not in acute distress.     Appearance: Normal appearance. He is not ill-appearing, toxic-appearing or diaphoretic.   HENT:      Head: Normocephalic.      Nose: Nose normal.   Eyes:      Pupils: Pupils are equal, round, and reactive to light.   Cardiovascular:      Rate and Rhythm: Normal rate and regular rhythm.   Pulmonary:      Effort: Pulmonary effort is normal. No respiratory distress.      Breath sounds: Normal breath sounds. No stridor.   Abdominal:      General: Abdomen is flat. There is no distension.      Palpations: Abdomen is soft. There is no mass.      Tenderness: There is no abdominal tenderness.      Hernia: No hernia is present.   Musculoskeletal:         General: Tenderness present. No deformity or signs of injury.      Right lower leg: Edema present.      Left lower leg: Edema present.   Skin:     General: Skin is warm and dry.      Coloration: Skin is not jaundiced or pale.      Comments: BLE wrapped at this time. Podiatry photos reviewed.  See below.    Neurological:      General: No focal deficit present.      Mental Status: He is alert and oriented to person, place, and time.   Psychiatric:         Mood and Affect: Mood normal.                             Significant Labs: All pertinent labs within the past 24 hours have been reviewed.    Significant Imaging: I have reviewed all pertinent imaging results/findings within the past 24  hours.

## 2020-12-12 NOTE — PROGRESS NOTES
[facility] Hospital Medicine  Progress Note    Patient Name: Saji Castañeda  MRN: [id number]  Patient Class: IP- Inpatient   Admission Date: 12/8/2020  Length of Stay: 3 days  Attending Physician: Kun Shook MD  Primary Care Provider: Lalo Heath MD    [facility] Medicine Team: [facility] [facility] [place] Kun Shook [place]    Subjective:     Principal Problem:Osteomyelitis    Interval History:   Sitting in wheelchair today     Vascular surgery ordered CTA w/runoff, will have to check if based on results if plans for angiography next week.     Wound cultures growing proteus species. Per ID vancomycin to be discontinued     Prior to admit pt was using a prosthetic for left lower leg.     Review of Systems   Constitutional: Negative for appetite change and fever.   Respiratory: Negative for chest tightness and shortness of breath.    Cardiovascular: Negative for chest pain.   Gastrointestinal: Negative for constipation.   Genitourinary: Negative for dysuria.     Objective:     Vital Signs (Most Recent):  Temp: 98 °F (36.7 °C) (12/11/20 1523)  Pulse: 98 (12/11/20 1721)  Resp: 18 (12/11/20 1555)  BP: 137/60 (12/11/20 1523)  SpO2: 99 % (12/11/20 1523) Vital Signs (24h Range):  Temp:  [97.3 °F (36.3 °C)-98.6 °F (37 °C)] 98 °F (36.7 °C)  Pulse:  [44-98] 98  Resp:  [17-18] 18  SpO2:  [94 %-99 %] 99 %  BP: (133-156)/(60-82) 137/60   Intake/[place]:  This Shift:  No intake/output data recorded.    Net I/O past 24h:     Intake/Output Summary (Last 24 hours) at 12/11/2020 1930  Last data filed at 12/11/2020 0630  Gross per 24 hour   Intake 60 ml   Output --   Net 60 ml         Weight: 136 kg (299 lb 13.2 oz)  Body mass index is 44.28 kg/m².  Physical Exam  Constitutional:       General: He is not in acute distress.     Appearance: He is obese. He is not toxic-appearing.   Cardiovascular:      Rate and Rhythm: Normal rate and regular rhythm.      Pulses: Normal pulses.      Heart sounds: No murmur.   Pulmonary:       Effort: Pulmonary effort is normal.      Breath sounds: No rales.   Abdominal:      General: Abdomen is flat. There is no distension.      Palpations: Abdomen is soft.      Tenderness: There is no abdominal tenderness.   Musculoskeletal:      Comments: S/p left BKA, distal end of leg in dressing  RLE/Foot dry skin, shin in wound dressing    Skin:     General: Skin is dry.   Neurological:      Mental Status: He is alert and oriented to person, place, and time.           Significant Labs:   CBC:   Recent Labs   Lab 12/10/20  0513 12/11/20 0424   WBC 6.40 5.72   HGB 10.4* 9.0*   HCT 33.3* 29.5*    321     CMP:   Recent Labs   Lab 12/10/20  0513 12/11/20  0424    135*   K 4.2 4.0    101   CO2 24 29   * 211*   BUN 14 16   CREATININE 1.2 1.3   CALCIUM 8.5* 8.0*   PROT 7.1 6.4   ALBUMIN 2.4* 2.2*   BILITOT 0.3 0.2   ALKPHOS 64 56   AST 21 15   ALT 14 11   ANIONGAP 10 5*   EGFRNONAA >60.0 54.9*       Significant Imaging: I have reviewed all pertinent imaging results/findings within the past 24 hours.    CTA RUNOFF ABD PEL BILAT LOWER EXT     CLINICAL HISTORY:  Ankle swelling/redness, cellulitis suspected;Peripheral arterial disease (PAD), prior revasc, symptomatic;     TECHNIQUE:  CTA of the abdomen, pelvis and bilateral lower extremities was performed following the intravenous administration of 125 cc Omnipaque 350.  Axial sagittal and coronal reformatted images submitted.     COMPARISON:  CT abdomen pelvis without contrast February 2012     FINDINGS:  Evaluation of the arterial system demonstrates celiac and SMA to be patent.  Likewise right and left renal arteries are patent.  Visualized GEORGIE is patent.     On the right the right common iliac internal and external iliac arteries are patent.  Right common femoral as well as the superficial and profundus femoral arteries are patent.  There is suboptimal contrast opacification.  There is some calcified plaque in the SFA with moderate narrowing  in the proximal popliteal artery at least 50% diameter narrowing.  Scattered calcified plaque in the more distal popliteal though no high-grade stenosis.  Again moderate narrowing in the distal popliteal just proximal to the trifurcation.  There is significant plaque at the origin of the right anterior tibial artery perhaps focal occlusion.  Significant calcified plaque in the tibioperoneal trunk.  There is significant calcified plaque in the peroneal artery.  Anterior tibial artery appears to be the dominant supply to the right foot.     On the left, common iliac as well as internal and external iliac arteries are patent.  No significant stenoses.  Left common femoral as well as the superficial and profundus femoral arteries are patent.  There is scattered plaque in the left SFA approximately 50% narrowing distally.  There is moderate narrowing in the adductor canal extending into the popliteal artery.  Moderate calcified plaque in the popliteal with 50-70% diameter narrowing at the level of femoral condyle.  Plaque extends caudally.  Again there is significant plaque in the distal popliteal at the trifurcation.  There is calcified plaque in all 3 calf vessels.  Again the anterior tibial appears to be the dominant supply with significant calcified plaque in the posterior tibial and peroneal arteries.  More distally all 3 vessels appear patent.  There is calcified plaque in the dorsalis pedis and posterior tibial.  Amputation left foot at the distal tarsal level.  Soft tissue defect anteriorly noted.  There is significant edema bilaterally.     In the chest no significant pleural or pericardial fluid.  Significant respiratory motion artifact.  No confluent consolidation.     In the abdomen liver is normal in size.  No focal masses is visualized on this CTA.  Some high-density about the periphery of the gallbladder wall may be early enhancement.  Pancreas is normal in size and contour.  Spleen not enlarged.  No  adrenal masses.  Hypodensity left kidney consistent with a cyst.  3 mm left lower pole renal calculus.     No significant para-aortic adenopathy.  IVC filter noted.  Nonenlarged pelvic nodes bilaterally.  Likewise bilateral inguinal nodes.  6.5 cm hypodensity in the right hemiscrotum.  Nonemergent ultrasound suggested.  There appears to be a soft tissue component inferiorly.  Prostate mildly enlarged.  Calcification abuts the anterior aspect of the bladder though was present on the prior study.     Evaluation of the bowel demonstrates feces in the colon.  Eventration of the anterior abdominal wall.  No focal bowel dilatation.  No convincing mesenteric adenopathy.     There is some atrophic changes in the bilateral lower extremity musculature.  Soft tissue stranding and edematous changes particularly in the calves though does extend into the thighs.  Skin defect abutting the left forefoot amputation.     Evaluation of the bones demonstrate hypertrophic degenerative changes.  Fusion of the SI joints.  There is some cortical thickening and periosteal new bone about the distal right fibula likely posttraumatic.  Postop change about the left foot with some osseous irregularity at the resection site.  Bones are demineralized.  There is fusion of the left tibia and fibula superiorly.     Impression:     Suboptimal opacification of the arterial system.  There is moderate plaque in the SFA and popliteal arteries bilaterally.  Significant three-vessel calf disease though the dominant supply appears to be the anterior tibial artery bilaterally.  Details as above.     Postop amputation of the left forefoot likely at the distal tarsal bones.  There appears to be some irregularity of the residual navicula and cuboid within adjacent soft tissue defect.  Osteomyelitis not excluded.  MRI left foot may be helpful.     Bilateral lower extremity edema/cellulitis.     Probable posttraumatic change right fibula.     6.5 cm hypodensity  right hemiscrotum.  This may be a hydrocele.  Ultrasound may be helpful.     Additional findings as above.     This report was flagged in Epic as abnormal.        Electronically signed by: Harris Espino MD  Date:                                            12/11/2020    MEDICATIONS  Scheduled Meds:   aspirin  81 mg Oral Daily    atorvastatin  40 mg Oral QHS    ceFEPime (MAXIPIME) IVPB  2 g Intravenous Q8H    enoxaparin  40 mg Subcutaneous Q12H    insulin aspart U-100  8 Units Subcutaneous TIDWM    insulin detemir U-100  25 Units Subcutaneous QHS    isosorbide-hydrALAZINE 20-37.5 mg  1 tablet Oral TID    losartan  50 mg Oral Daily    metroNIDAZOLE  500 mg Oral Q8H    multivitamin  1 tablet Oral Daily    senna-docusate 8.6-50 mg  1 tablet Oral Daily    tamsulosin  0.4 mg Oral Daily    zinc sulfate  220 mg Oral Daily                             Continuous Infusions:  PRN Meds:.acetaminophen, bisacodyL, dextrose 50%, dextrose 50%, glucagon (human recombinant), glucose, glucose, hydrALAZINE, HYDROcodone-acetaminophen, insulin aspart U-100, melatonin, morphine, ondansetron, sodium chloride 0.9%    VTE Risk Mitigation (From admission, onward)         Ordered     enoxaparin injection 40 mg  Every 12 hours      12/10/20 1027     IP VTE HIGH RISK PATIENT  Once      12/08/20 1629     Place sequential compression device  Until discontinued      12/08/20 1629                Assessment/Plan:     Overview/Hospital Course:     69 y/o AAM hx of Type 2 DM, HLD, PAD s/p Left BKA who is admitted with bilateral LE wounds and concern for LLE osteomyelitis secondary poorly healing wounds/ulcers.     1. Acute osteomyelitis   -s/p Bone Marrow Biopsy LLE (12/09)  -Left LEg Wound with Preoteus Species on bone and wound cultures   >ID discontinuing vancomycin    >Continue Cefepime & Flagyl pending final culture results.   - Podiatry consults following, managing wound care to Left leg and RLE    2. Peripheral arterial disease    -vascular surg recs 3 pillow elevation of legs at all times  -vascular ordered CTA with runoff, some suboptimal study quality reported but f/u this weekend with vascular surgery re: any potential angiography on Monday 12/14.     3. Type 2 Diabetes Mellitus, uncontrolled w/neuropathy   -continue aspart 8 units TID and basal insulin 25 units nightly  -well controlled glucose levels at this time.   -A1c 7.9 @ admission.   -Discussed trial of Gabapentin to help with chronic pain in his legs, will start @ 200 mg BID dosage this evening and eval based on pt response.     4. Essential Hypertension   -continue on BIDIL, Losartan      5. BPH   -continue on home flomax.         Code Status: Prior    High Risk Conditions:  Patient has a condition that poses threat to life and bodily function: Acute Osteomyelitis  Patient is currently on drug therapy requiring intensive monitoring for toxicity: Vancomycin    Discharge Planning   OXANA: 12/15/2020     Code Status: Prior   Is the patient medically ready for discharge?: No    Reason for patient still in hospital (select all that apply): Patient trending condition, Laboratory test, Treatment, Imaging and Consult recommendations  Discharge Plan A: Home, Home Health          Active Hospital Problems    Diagnosis  POA    *Osteomyelitis [M86.9]  Yes     Priority: 1 - High    Peripheral arterial disease [I73.9]  Yes     Priority: 2      Chronic    Wound, open, foot with complication [S91.309A]  Yes     Priority: 2     Type 2 diabetes, uncontrolled, with neuropathy [E11.40, E11.65]  Yes     Priority: 3      Chronic    Diabetic neuropathy [E11.40]  Yes     Priority: 4      Chronic    Edema of leg [R60.0]  Yes     Priority: 5      Chronic    Mobitz (type) I (Wenckebach's) atrioventricular block [I44.1]  Yes     Priority: 6     Essential hypertension [I10]  Yes     Chronic      Resolved Hospital Problems   No resolved problems to display.                 Kun Shook,  M.D.  Attending [facility] Medicine Dept.  Pager: [phone]  [facility]  [id number]

## 2020-12-13 LAB
BACTERIA BLD CULT: NORMAL
BACTERIA BLD CULT: NORMAL
POCT GLUCOSE: 210 MG/DL (ref 70–110)
POCT GLUCOSE: 285 MG/DL (ref 70–110)
POCT GLUCOSE: 288 MG/DL (ref 70–110)

## 2020-12-13 PROCEDURE — 25000003 PHARM REV CODE 250: Performed by: HOSPITALIST

## 2020-12-13 PROCEDURE — 63600175 PHARM REV CODE 636 W HCPCS: Performed by: PHYSICIAN ASSISTANT

## 2020-12-13 PROCEDURE — 99232 PR SUBSEQUENT HOSPITAL CARE,LEVL II: ICD-10-PCS | Mod: ,,, | Performed by: HOSPITALIST

## 2020-12-13 PROCEDURE — 63600175 PHARM REV CODE 636 W HCPCS: Performed by: HOSPITALIST

## 2020-12-13 PROCEDURE — 99232 SBSQ HOSP IP/OBS MODERATE 35: CPT | Mod: ,,, | Performed by: HOSPITALIST

## 2020-12-13 PROCEDURE — 11000001 HC ACUTE MED/SURG PRIVATE ROOM

## 2020-12-13 RX ADMIN — INSULIN ASPART 3 UNITS: 100 INJECTION, SOLUTION INTRAVENOUS; SUBCUTANEOUS at 04:12

## 2020-12-13 RX ADMIN — ATORVASTATIN CALCIUM 40 MG: 20 TABLET, FILM COATED ORAL at 08:12

## 2020-12-13 RX ADMIN — ENOXAPARIN SODIUM 40 MG: 40 INJECTION SUBCUTANEOUS at 08:12

## 2020-12-13 RX ADMIN — THERA TABS 1 TABLET: TAB at 08:12

## 2020-12-13 RX ADMIN — ZINC SULFATE 220 MG (50 MG) CAPSULE 220 MG: CAPSULE at 08:12

## 2020-12-13 RX ADMIN — INSULIN ASPART 8 UNITS: 100 INJECTION, SOLUTION INTRAVENOUS; SUBCUTANEOUS at 08:12

## 2020-12-13 RX ADMIN — INSULIN ASPART 1 UNITS: 100 INJECTION, SOLUTION INTRAVENOUS; SUBCUTANEOUS at 08:12

## 2020-12-13 RX ADMIN — TAMSULOSIN HYDROCHLORIDE 0.4 MG: 0.4 CAPSULE ORAL at 08:12

## 2020-12-13 RX ADMIN — LOSARTAN POTASSIUM 50 MG: 50 TABLET, FILM COATED ORAL at 08:12

## 2020-12-13 RX ADMIN — HYDRALAZINE HYDROCHLORIDE AND ISOSORBIDE DINITRATE 1 TABLET: 37.5; 2 TABLET, FILM COATED ORAL at 04:12

## 2020-12-13 RX ADMIN — INSULIN ASPART 3 UNITS: 100 INJECTION, SOLUTION INTRAVENOUS; SUBCUTANEOUS at 11:12

## 2020-12-13 RX ADMIN — INSULIN DETEMIR 25 UNITS: 100 INJECTION, SOLUTION SUBCUTANEOUS at 08:12

## 2020-12-13 RX ADMIN — ASPIRIN 81 MG CHEWABLE TABLET 81 MG: 81 TABLET CHEWABLE at 08:12

## 2020-12-13 RX ADMIN — HYDROCODONE BITARTRATE AND ACETAMINOPHEN 1 TABLET: 5; 325 TABLET ORAL at 08:12

## 2020-12-13 RX ADMIN — GABAPENTIN 200 MG: 100 CAPSULE ORAL at 08:12

## 2020-12-13 RX ADMIN — INSULIN ASPART 8 UNITS: 100 INJECTION, SOLUTION INTRAVENOUS; SUBCUTANEOUS at 11:12

## 2020-12-13 RX ADMIN — CEFEPIME 2 G: 1 INJECTION, POWDER, FOR SOLUTION INTRAMUSCULAR; INTRAVENOUS at 05:12

## 2020-12-13 RX ADMIN — INSULIN ASPART 8 UNITS: 100 INJECTION, SOLUTION INTRAVENOUS; SUBCUTANEOUS at 04:12

## 2020-12-13 RX ADMIN — HYDRALAZINE HYDROCHLORIDE 25 MG: 25 TABLET ORAL at 11:12

## 2020-12-13 RX ADMIN — HYDRALAZINE HYDROCHLORIDE AND ISOSORBIDE DINITRATE 1 TABLET: 37.5; 2 TABLET, FILM COATED ORAL at 08:12

## 2020-12-13 RX ADMIN — CEFEPIME 2 G: 1 INJECTION, POWDER, FOR SOLUTION INTRAMUSCULAR; INTRAVENOUS at 11:12

## 2020-12-13 RX ADMIN — DEXTROSE 6 G: 5 SOLUTION INTRAVENOUS at 09:12

## 2020-12-13 RX ADMIN — INSULIN ASPART 2 UNITS: 100 INJECTION, SOLUTION INTRAVENOUS; SUBCUTANEOUS at 08:12

## 2020-12-13 NOTE — PLAN OF CARE
PT AAOX4, VSS, complaints of pain managed with PRN medication per orders. BS and tele monitoring maintained per orders. TB test read and negative. NO acute changes, see previous notes, will continue to monitor.   Problem: Fall Injury Risk  Goal: Absence of Fall and Fall-Related Injury  Outcome: Ongoing, Progressing  Intervention: Identify and Manage Contributors to Fall Injury Risk  Flowsheets (Taken 12/12/2020 1811)  Self-Care Promotion:   independence encouraged   safe use of adaptive equipment encouraged   BADL personal routines maintained   BADL personal objects within reach   meal setup provided  Medication Review/Management:   medications reviewed   high risk medications identified  Intervention: Promote Injury-Free Environment  Flowsheets (Taken 12/12/2020 1811)  Safety Promotion/Fall Prevention:   side rails raised x 2   bed alarm set   assistive device/personal item within reach   commode/urinal/bedpan at bedside   diversional activities provided   Fall Risk reviewed with patient/family   Fall Risk signage in place   high risk medications identified   lighting adjusted   medications reviewed   nonskid shoes/socks when out of bed   instructed to call staff for mobility  Environmental Safety Modification:   assistive device/personal items within reach   clutter free environment maintained   lighting adjusted   room organization consistent     Problem: Adult Inpatient Plan of Care  Goal: Plan of Care Review  Outcome: Ongoing, Progressing  Flowsheets (Taken 12/12/2020 1811)  Plan of Care Reviewed With: patient  Goal: Patient-Specific Goal (Individualization)  Outcome: Ongoing, Progressing  Goal: Absence of Hospital-Acquired Illness or Injury  Outcome: Ongoing, Progressing  Intervention: Identify and Manage Fall Risk  Flowsheets (Taken 12/12/2020 1811)  Safety Promotion/Fall Prevention:   side rails raised x 2   bed alarm set   assistive device/personal item within reach   commode/urinal/bedpan at bedside    diversional activities provided   Fall Risk reviewed with patient/family   Fall Risk signage in place   high risk medications identified   lighting adjusted   medications reviewed   nonskid shoes/socks when out of bed   instructed to call staff for mobility  Intervention: Prevent VTE (venous thromboembolism)  Flowsheets (Taken 12/12/2020 1811)  VTE Prevention/Management:   ambulation promoted   bleeding precautions maintained   bleeding risk assessed   bleeding risk factor(s) identified, provider notified   fluids promoted  Goal: Optimal Comfort and Wellbeing  Outcome: Ongoing, Progressing  Intervention: Provide Person-Centered Care  Flowsheets (Taken 12/12/2020 1811)  Trust Relationship/Rapport:   care explained   questions encouraged   choices provided   reassurance provided   emotional support provided   thoughts/feelings acknowledged   empathic listening provided   questions answered  Goal: Readiness for Transition of Care  Outcome: Ongoing, Progressing  Goal: Rounds/Family Conference  Outcome: Ongoing, Progressing     Problem: Bariatric Environmental Safety  Goal: Safety Maintained with Care  Outcome: Ongoing, Progressing     Problem: Diabetes Comorbidity  Goal: Blood Glucose Level Within Desired Range  Outcome: Ongoing, Progressing     Problem: Electrolyte Imbalance (Acute Kidney Injury/Impairment)  Goal: Serum Electrolyte Balance  Outcome: Ongoing, Progressing     Problem: Fluid Imbalance (Acute Kidney Injury/Impairment)  Goal: Optimal Fluid Balance  Outcome: Ongoing, Progressing     Problem: Hematologic Alteration (Acute Kidney Injury/Impairment)  Goal: Hemoglobin, Hematocrit and Platelets Within Normal Range  Outcome: Ongoing, Progressing     Problem: Oral Intake Inadequate (Acute Kidney Injury/Impairment)  Goal: Optimal Nutrition Intake  Outcome: Ongoing, Progressing     Problem: Renal Function Impairment (Acute Kidney Injury/Impairment)  Goal: Effective Renal Function  Outcome: Ongoing, Progressing      Problem: Skin Injury Risk Increased  Goal: Skin Health and Integrity  Outcome: Ongoing, Progressing     Problem: Wound  Goal: Optimal Wound Healing  Outcome: Ongoing, Progressing

## 2020-12-13 NOTE — PLAN OF CARE
Problem: Fall Injury Risk  Goal: Absence of Fall and Fall-Related Injury  Outcome: Ongoing, Progressing     Problem: Adult Inpatient Plan of Care  Goal: Plan of Care Review  Outcome: Ongoing, Progressing  Goal: Patient-Specific Goal (Individualization)  Outcome: Ongoing, Progressing  Goal: Absence of Hospital-Acquired Illness or Injury  Outcome: Ongoing, Progressing  Goal: Optimal Comfort and Wellbeing  Outcome: Ongoing, Progressing  Goal: Readiness for Transition of Care  Outcome: Ongoing, Progressing  Goal: Rounds/Family Conference  Outcome: Ongoing, Progressing     Problem: Bariatric Environmental Safety  Goal: Safety Maintained with Care  Outcome: Ongoing, Progressing     Problem: Diabetes Comorbidity  Goal: Blood Glucose Level Within Desired Range  Outcome: Ongoing, Progressing     Problem: Electrolyte Imbalance (Acute Kidney Injury/Impairment)  Goal: Serum Electrolyte Balance  Outcome: Ongoing, Progressing     Problem: Fluid Imbalance (Acute Kidney Injury/Impairment)  Goal: Optimal Fluid Balance  Outcome: Ongoing, Progressing     Problem: Hematologic Alteration (Acute Kidney Injury/Impairment)  Goal: Hemoglobin, Hematocrit and Platelets Within Normal Range  Outcome: Ongoing, Progressing     Problem: Oral Intake Inadequate (Acute Kidney Injury/Impairment)  Goal: Optimal Nutrition Intake  Outcome: Ongoing, Progressing     Problem: Renal Function Impairment (Acute Kidney Injury/Impairment)  Goal: Effective Renal Function  Outcome: Ongoing, Progressing     Problem: Skin Injury Risk Increased  Goal: Skin Health and Integrity  Outcome: Ongoing, Progressing     Problem: Wound  Goal: Optimal Wound Healing  Outcome: Ongoing, Progressing     AAOx4. VS stable. PRN B/P meds administered for SBP >170, relief noted. No falls or injuries. Bed low, wheels locked side rails up x2, call light within reach. Will continue to monitor pt.

## 2020-12-13 NOTE — PLAN OF CARE
Pt alert. V/s stable. Weight shift assistance provided. Uses wheelchair to maneuver independently. Dressing clean, dry, intact. No complaints of pain or discomfort at this time. Will continue to monitor and interventions as appropriate.     Problem: Fall Injury Risk  Goal: Absence of Fall and Fall-Related Injury  Outcome: Ongoing, Progressing     Problem: Adult Inpatient Plan of Care  Goal: Plan of Care Review  Outcome: Ongoing, Progressing     Problem: Adult Inpatient Plan of Care  Goal: Absence of Hospital-Acquired Illness or Injury  Outcome: Ongoing, Progressing     Problem: Adult Inpatient Plan of Care  Goal: Optimal Comfort and Wellbeing  Outcome: Ongoing, Progressing

## 2020-12-13 NOTE — PROGRESS NOTES
[facility] Hospital Medicine  Progress Note    Patient Name: Saji Castañeda  MRN: [id number]  Patient Class: IP- Inpatient   Admission Date: 12/8/2020  Length of Stay: 4 days  Attending Physician: Kun Shook MD  Primary Care Provider: Lalo Heath MD    [facility] Medicine Team: [facility] Kun Shook MD    Subjective:     Principal Problem:Osteomyelitis    Interval History:   Reports intensity of pain has eased some since receiving gabapentin yesterday evening and this morning    Does report some somnolence after med    Slept well overnight    Sensitivity back on Proteus culture will have to discuss final abx choice with ID.  Possible that pt could take Cipro Oral therapy      Review of Systems   Constitutional: Negative for appetite change and fever.   Respiratory: Negative for chest tightness and shortness of breath.    Cardiovascular: Negative for chest pain.   Gastrointestinal: Negative for constipation.   Genitourinary: Negative for dysuria.     Objective:     Vital Signs (Most Recent):  Temp: 99 °F (37.2 °C) (12/12/20 2004)  Pulse: (!) 47 (12/12/20 2004)  Resp: 16 (12/12/20 2004)  BP: (!) 168/72 (12/12/20 2004)  SpO2: (!) 92 % (12/12/20 2004) Vital Signs (24h Range):  Temp:  [97.5 °F (36.4 °C)-99 °F (37.2 °C)] 99 °F (37.2 °C)  Pulse:  [43-83] 47  Resp:  [16-19] 16  SpO2:  [92 %-98 %] 92 %  BP: (130-188)/(70-80) 168/72   Intake/[place]ake:  This Shift:  No intake/output data recorded.    Net I/O past 24h:     Intake/Output Summary (Last 24 hours) at 12/12/2020 2132  Last data filed at 12/12/2020 1416  Gross per 24 hour   Intake 90 ml   Output 450 ml   Net -360 ml         Weight: 136 kg (299 lb 13.2 oz)  Body mass index is 44.28 kg/m².  Physical Exam  Constitutional:       General: He is not in acute distress.     Appearance: He is obese. He is not toxic-appearing.   Cardiovascular:      Rate and Rhythm: Normal rate and regular rhythm.      Pulses: Normal pulses.      Heart  sounds: No murmur.   Pulmonary:      Effort: Pulmonary effort is normal.      Breath sounds: No rales.   Abdominal:      General: Abdomen is flat. There is no distension.      Palpations: Abdomen is soft.      Tenderness: There is no abdominal tenderness.   Musculoskeletal:      Comments: S/p left BKA, distal end of leg in dressing  RLE/Foot dry skin, shin in wound dressing    Skin:     General: Skin is dry.   Neurological:      Mental Status: He is alert and oriented to person, place, and time.           Significant Labs:   CBC:   Recent Labs   Lab 12/11/20  0424 12/12/20  0616   WBC 5.72 6.19   HGB 9.0* 9.2*   HCT 29.5* 31.1*    332     CMP:   Recent Labs   Lab 12/11/20  0424 12/12/20  0616   * 138   K 4.0 3.9    104   CO2 29 26   * 170*   BUN 16 15   CREATININE 1.3 1.2   CALCIUM 8.0* 8.3*   PROT 6.4 6.3   ALBUMIN 2.2* 2.1*   BILITOT 0.2 0.3   ALKPHOS 56 56   AST 15 21   ALT 11 15   ANIONGAP 5* 8   EGFRNONAA 54.9* >60.0       Significant Imaging: I have reviewed all pertinent imaging results/findings within the past 24 hours.    CTA RUNOFF ABD PEL BILAT LOWER EXT     CLINICAL HISTORY:  Ankle swelling/redness, cellulitis suspected;Peripheral arterial disease (PAD), prior revasc, symptomatic;     TECHNIQUE:  CTA of the abdomen, pelvis and bilateral lower extremities was performed following the intravenous administration of 125 cc Omnipaque 350.  Axial sagittal and coronal reformatted images submitted.     COMPARISON:  CT abdomen pelvis without contrast February 2012     FINDINGS:  Evaluation of the arterial system demonstrates celiac and SMA to be patent.  Likewise right and left renal arteries are patent.  Visualized GEORGIE is patent.     On the right the right common iliac internal and external iliac arteries are patent.  Right common femoral as well as the superficial and profundus femoral arteries are patent.  There is suboptimal contrast opacification.  There is some calcified plaque in  the SFA with moderate narrowing in the proximal popliteal artery at least 50% diameter narrowing.  Scattered calcified plaque in the more distal popliteal though no high-grade stenosis.  Again moderate narrowing in the distal popliteal just proximal to the trifurcation.  There is significant plaque at the origin of the right anterior tibial artery perhaps focal occlusion.  Significant calcified plaque in the tibioperoneal trunk.  There is significant calcified plaque in the peroneal artery.  Anterior tibial artery appears to be the dominant supply to the right foot.     On the left, common iliac as well as internal and external iliac arteries are patent.  No significant stenoses.  Left common femoral as well as the superficial and profundus femoral arteries are patent.  There is scattered plaque in the left SFA approximately 50% narrowing distally.  There is moderate narrowing in the adductor canal extending into the popliteal artery.  Moderate calcified plaque in the popliteal with 50-70% diameter narrowing at the level of femoral condyle.  Plaque extends caudally.  Again there is significant plaque in the distal popliteal at the trifurcation.  There is calcified plaque in all 3 calf vessels.  Again the anterior tibial appears to be the dominant supply with significant calcified plaque in the posterior tibial and peroneal arteries.  More distally all 3 vessels appear patent.  There is calcified plaque in the dorsalis pedis and posterior tibial.  Amputation left foot at the distal tarsal level.  Soft tissue defect anteriorly noted.  There is significant edema bilaterally.     In the chest no significant pleural or pericardial fluid.  Significant respiratory motion artifact.  No confluent consolidation.     In the abdomen liver is normal in size.  No focal masses is visualized on this CTA.  Some high-density about the periphery of the gallbladder wall may be early enhancement.  Pancreas is normal in size and contour.   Spleen not enlarged.  No adrenal masses.  Hypodensity left kidney consistent with a cyst.  3 mm left lower pole renal calculus.     No significant para-aortic adenopathy.  IVC filter noted.  Nonenlarged pelvic nodes bilaterally.  Likewise bilateral inguinal nodes.  6.5 cm hypodensity in the right hemiscrotum.  Nonemergent ultrasound suggested.  There appears to be a soft tissue component inferiorly.  Prostate mildly enlarged.  Calcification abuts the anterior aspect of the bladder though was present on the prior study.     Evaluation of the bowel demonstrates feces in the colon.  Eventration of the anterior abdominal wall.  No focal bowel dilatation.  No convincing mesenteric adenopathy.     There is some atrophic changes in the bilateral lower extremity musculature.  Soft tissue stranding and edematous changes particularly in the calves though does extend into the thighs.  Skin defect abutting the left forefoot amputation.     Evaluation of the bones demonstrate hypertrophic degenerative changes.  Fusion of the SI joints.  There is some cortical thickening and periosteal new bone about the distal right fibula likely posttraumatic.  Postop change about the left foot with some osseous irregularity at the resection site.  Bones are demineralized.  There is fusion of the left tibia and fibula superiorly.     Impression:     Suboptimal opacification of the arterial system.  There is moderate plaque in the SFA and popliteal arteries bilaterally.  Significant three-vessel calf disease though the dominant supply appears to be the anterior tibial artery bilaterally.  Details as above.     Postop amputation of the left forefoot likely at the distal tarsal bones.  There appears to be some irregularity of the residual navicula and cuboid within adjacent soft tissue defect.  Osteomyelitis not excluded.  MRI left foot may be helpful.     Bilateral lower extremity edema/cellulitis.     Probable posttraumatic change right  fibula.     6.5 cm hypodensity right hemiscrotum.  This may be a hydrocele.  Ultrasound may be helpful.     Additional findings as above.     This report was flagged in Epic as abnormal.        Electronically signed by: Harris Espino MD  Date:                                            12/11/2020    MEDICATIONS  Scheduled Meds:   aspirin  81 mg Oral Daily    atorvastatin  40 mg Oral QHS    ceFEPime (MAXIPIME) IVPB  2 g Intravenous Q8H    enoxaparin  40 mg Subcutaneous Q12H    gabapentin  200 mg Oral BID    insulin aspart U-100  8 Units Subcutaneous TIDWM    insulin detemir U-100  25 Units Subcutaneous QHS    isosorbide-hydrALAZINE 20-37.5 mg  1 tablet Oral TID    losartan  50 mg Oral Daily    multivitamin  1 tablet Oral Daily    senna-docusate 8.6-50 mg  1 tablet Oral Daily    tamsulosin  0.4 mg Oral Daily    zinc sulfate  220 mg Oral Daily                             Continuous Infusions:  PRN Meds:.acetaminophen, bisacodyL, dextrose 50%, dextrose 50%, glucagon (human recombinant), glucose, glucose, hydrALAZINE, HYDROcodone-acetaminophen, insulin aspart U-100, melatonin, morphine, ondansetron, sodium chloride 0.9%    VTE Risk Mitigation (From admission, onward)         Ordered     enoxaparin injection 40 mg  Every 12 hours      12/10/20 1027     IP VTE HIGH RISK PATIENT  Once      12/08/20 1629     Place sequential compression device  Until discontinued      12/08/20 1629                Assessment/Plan:     Overview/Hospital Course:     69 y/o AAM hx of Type 2 DM, HLD, PAD s/p Left BKA who is admitted with bilateral LE wounds and concern for LLE osteomyelitis secondary poorly healing wounds/ulcers.     1. Acute osteomyelitis   -s/p Bone Marrow Biopsy LLE (12/09)  -Left LEg Wound with Preoteus Species on bone and wound cultures   >ID discontinued vancomycin & flagyl   >Continue Cefepime pending final ID recs.   - Podiatry consults following, managing wound care to Left leg and RLE    2. Peripheral  arterial disease   -vascular surg recs 3 pillow elevation of legs at all times  -vascular ordered CTA with runoff, some suboptimal study quality reported but f/u this weekend with vascular surgery re: any potential angiography on Monday 12/14.     3. Type 2 Diabetes Mellitus, uncontrolled w/neuropathy   -continue aspart 8 units TID and basal insulin 25 units nightly  -well controlled glucose levels at this time.   -A1c 7.9 @ admission.   -Continue Gabapentin 200mg PO BID, if pt tolerating well consider titration in coming days.     4. Essential Hypertension   -continue on BIDIL, Losartan      5. BPH   -continue on home flomax.         Code Status: Full Code    High Risk Conditions:  Patient has a condition that poses threat to life and bodily function: Acute Osteomyelitis  Patient is currently on drug therapy requiring intensive monitoring for toxicity: Vancomycin    Discharge Planning   OXANA: 12/15/2020     Code Status: Prior   Is the patient medically ready for discharge?: No    Reason for patient still in hospital (select all that apply): Patient trending condition, Laboratory test, Treatment, Imaging and Consult recommendations  Discharge Plan A: Home, Home Health          Active Hospital Problems    Diagnosis  POA    *Osteomyelitis [M86.9]  Yes     Priority: 1 - High    Peripheral arterial disease [I73.9]  Yes     Priority: 2      Chronic    Wound, open, foot with complication [S91.309A]  Yes     Priority: 2     Type 2 diabetes, uncontrolled, with neuropathy [E11.40, E11.65]  Yes     Priority: 3      Chronic    Diabetic neuropathy [E11.40]  Yes     Priority: 4      Chronic    Edema of leg [R60.0]  Yes     Priority: 5      Chronic    Mobitz (type) I (Wenckebach's) atrioventricular block [I44.1]  Yes     Priority: 6     Leg wound, left [S81.802A]  Yes    Essential hypertension [I10]  Yes     Chronic      Resolved Hospital Problems   No resolved problems to display.                 Kun Shook,  M.D.  Attending [facility] Medicine Dept.  Pager: [phone]  [facility]  [phone]

## 2020-12-14 VITALS
SYSTOLIC BLOOD PRESSURE: 187 MMHG | DIASTOLIC BLOOD PRESSURE: 84 MMHG | HEART RATE: 81 BPM | HEIGHT: 69 IN | BODY MASS INDEX: 44.4 KG/M2 | WEIGHT: 299.81 LBS | OXYGEN SATURATION: 98 % | TEMPERATURE: 98 F | RESPIRATION RATE: 18 BRPM

## 2020-12-14 LAB
ALBUMIN SERPL BCP-MCNC: 2.2 G/DL (ref 3.5–5.2)
ANION GAP SERPL CALC-SCNC: 9 MMOL/L (ref 8–16)
BACTERIA SPEC AEROBE CULT: ABNORMAL
BASOPHILS # BLD AUTO: 0.05 K/UL (ref 0–0.2)
BASOPHILS NFR BLD: 0.8 % (ref 0–1.9)
BUN SERPL-MCNC: 15 MG/DL (ref 8–23)
CALCIUM SERPL-MCNC: 8.2 MG/DL (ref 8.7–10.5)
CHLORIDE SERPL-SCNC: 104 MMOL/L (ref 95–110)
CO2 SERPL-SCNC: 24 MMOL/L (ref 23–29)
CREAT SERPL-MCNC: 1.3 MG/DL (ref 0.5–1.4)
CRP SERPL-MCNC: 17 MG/L (ref 0–8.2)
DIFFERENTIAL METHOD: ABNORMAL
EOSINOPHIL # BLD AUTO: 0.6 K/UL (ref 0–0.5)
EOSINOPHIL NFR BLD: 9.3 % (ref 0–8)
ERYTHROCYTE [DISTWIDTH] IN BLOOD BY AUTOMATED COUNT: 17 % (ref 11.5–14.5)
EST. GFR  (AFRICAN AMERICAN): >60 ML/MIN/1.73 M^2
EST. GFR  (NON AFRICAN AMERICAN): 54.9 ML/MIN/1.73 M^2
FINAL PATHOLOGIC DIAGNOSIS: NORMAL
GLUCOSE SERPL-MCNC: 225 MG/DL (ref 70–110)
GROSS: NORMAL
HCT VFR BLD AUTO: 30.5 % (ref 40–54)
HGB BLD-MCNC: 9 G/DL (ref 14–18)
IMM GRANULOCYTES # BLD AUTO: 0.04 K/UL (ref 0–0.04)
IMM GRANULOCYTES NFR BLD AUTO: 0.7 % (ref 0–0.5)
LYMPHOCYTES # BLD AUTO: 1.3 K/UL (ref 1–4.8)
LYMPHOCYTES NFR BLD: 20.8 % (ref 18–48)
Lab: NORMAL
MAGNESIUM SERPL-MCNC: 2 MG/DL (ref 1.6–2.6)
MCH RBC QN AUTO: 25.4 PG (ref 27–31)
MCHC RBC AUTO-ENTMCNC: 29.5 G/DL (ref 32–36)
MCV RBC AUTO: 86 FL (ref 82–98)
MONOCYTES # BLD AUTO: 0.7 K/UL (ref 0.3–1)
MONOCYTES NFR BLD: 10.9 % (ref 4–15)
NEUTROPHILS # BLD AUTO: 3.5 K/UL (ref 1.8–7.7)
NEUTROPHILS NFR BLD: 57.5 % (ref 38–73)
NRBC BLD-RTO: 0 /100 WBC
PHOSPHATE SERPL-MCNC: 3 MG/DL (ref 2.7–4.5)
PLATELET # BLD AUTO: 340 K/UL (ref 150–350)
PMV BLD AUTO: 9.7 FL (ref 9.2–12.9)
POCT GLUCOSE: 245 MG/DL (ref 70–110)
POCT GLUCOSE: 258 MG/DL (ref 70–110)
POCT GLUCOSE: 259 MG/DL (ref 70–110)
POTASSIUM SERPL-SCNC: 4 MMOL/L (ref 3.5–5.1)
RBC # BLD AUTO: 3.54 M/UL (ref 4.6–6.2)
SODIUM SERPL-SCNC: 137 MMOL/L (ref 136–145)
WBC # BLD AUTO: 6.15 K/UL (ref 3.9–12.7)

## 2020-12-14 PROCEDURE — 99239 PR HOSPITAL DISCHARGE DAY,>30 MIN: ICD-10-PCS | Mod: ,,, | Performed by: HOSPITALIST

## 2020-12-14 PROCEDURE — 25000003 PHARM REV CODE 250: Performed by: HOSPITALIST

## 2020-12-14 PROCEDURE — 85025 COMPLETE CBC W/AUTO DIFF WBC: CPT

## 2020-12-14 PROCEDURE — 99239 HOSP IP/OBS DSCHRG MGMT >30: CPT | Mod: ,,, | Performed by: HOSPITALIST

## 2020-12-14 PROCEDURE — 80069 RENAL FUNCTION PANEL: CPT

## 2020-12-14 PROCEDURE — 97116 GAIT TRAINING THERAPY: CPT

## 2020-12-14 PROCEDURE — 99233 SBSQ HOSP IP/OBS HIGH 50: CPT | Mod: ,,, | Performed by: PHYSICIAN ASSISTANT

## 2020-12-14 PROCEDURE — 63600175 PHARM REV CODE 636 W HCPCS: Performed by: HOSPITALIST

## 2020-12-14 PROCEDURE — 83735 ASSAY OF MAGNESIUM: CPT

## 2020-12-14 PROCEDURE — 99233 PR SUBSEQUENT HOSPITAL CARE,LEVL III: ICD-10-PCS | Mod: ,,, | Performed by: PHYSICIAN ASSISTANT

## 2020-12-14 PROCEDURE — 36415 COLL VENOUS BLD VENIPUNCTURE: CPT

## 2020-12-14 PROCEDURE — 86140 C-REACTIVE PROTEIN: CPT

## 2020-12-14 PROCEDURE — 97530 THERAPEUTIC ACTIVITIES: CPT

## 2020-12-14 RX ORDER — ACETAMINOPHEN 325 MG/1
650 TABLET ORAL EVERY 6 HOURS PRN
Refills: 0 | Status: ON HOLD | COMMUNITY
Start: 2020-12-14 | End: 2022-06-20 | Stop reason: CLARIF

## 2020-12-14 RX ORDER — LOSARTAN POTASSIUM 50 MG/1
50 TABLET ORAL
Status: DISCONTINUED | OUTPATIENT
Start: 2020-12-14 | End: 2020-12-14 | Stop reason: HOSPADM

## 2020-12-14 RX ORDER — INSULIN GLARGINE 100 [IU]/ML
68 INJECTION, SOLUTION SUBCUTANEOUS NIGHTLY
Status: ON HOLD | COMMUNITY
End: 2021-02-28 | Stop reason: HOSPADM

## 2020-12-14 RX ORDER — CIPROFLOXACIN 750 MG/1
750 TABLET, FILM COATED ORAL EVERY 12 HOURS
Qty: 76 TABLET | Refills: 0 | Status: SHIPPED | OUTPATIENT
Start: 2020-12-14 | End: 2021-01-21

## 2020-12-14 RX ORDER — INSULIN ASPART 100 [IU]/ML
10 INJECTION, SOLUTION INTRAVENOUS; SUBCUTANEOUS
Status: DISCONTINUED | OUTPATIENT
Start: 2020-12-14 | End: 2020-12-14 | Stop reason: HOSPADM

## 2020-12-14 RX ORDER — ISOSORBIDE DINITRATE AND HYDRALAZINE HYDROCHLORIDE 37.5; 2 MG/1; MG/1
2 TABLET ORAL 3 TIMES DAILY
Status: DISCONTINUED | OUTPATIENT
Start: 2020-12-14 | End: 2020-12-14

## 2020-12-14 RX ORDER — HYDROCODONE BITARTRATE AND ACETAMINOPHEN 5; 325 MG/1; MG/1
1 TABLET ORAL EVERY 8 HOURS PRN
Qty: 21 TABLET | Refills: 0 | Status: SHIPPED | OUTPATIENT
Start: 2020-12-14 | End: 2020-12-21

## 2020-12-14 RX ORDER — METFORMIN HYDROCHLORIDE 500 MG/1
500 TABLET, EXTENDED RELEASE ORAL DAILY
COMMUNITY
Start: 2020-11-02 | End: 2021-08-18 | Stop reason: SDUPTHER

## 2020-12-14 RX ORDER — LOSARTAN POTASSIUM 50 MG/1
50 TABLET ORAL 2 TIMES DAILY
Qty: 60 TABLET | Refills: 1 | Status: SHIPPED | OUTPATIENT
Start: 2020-12-14 | End: 2021-06-09

## 2020-12-14 RX ORDER — ZINC SULFATE 50(220)MG
220 CAPSULE ORAL DAILY
Status: ON HOLD | COMMUNITY
Start: 2020-12-15 | End: 2022-06-20

## 2020-12-14 RX ORDER — ISOSORBIDE DINITRATE AND HYDRALAZINE HYDROCHLORIDE 37.5; 2 MG/1; MG/1
1 TABLET ORAL 3 TIMES DAILY
Qty: 90 TABLET | Refills: 2 | Status: ON HOLD | OUTPATIENT
Start: 2020-12-14 | End: 2022-06-20

## 2020-12-14 RX ORDER — ISOSORBIDE DINITRATE AND HYDRALAZINE HYDROCHLORIDE 37.5; 2 MG/1; MG/1
1 TABLET ORAL 3 TIMES DAILY
Status: DISCONTINUED | OUTPATIENT
Start: 2020-12-14 | End: 2020-12-14 | Stop reason: HOSPADM

## 2020-12-14 RX ORDER — ATORVASTATIN CALCIUM 40 MG/1
40 TABLET, FILM COATED ORAL NIGHTLY
Qty: 30 TABLET | Refills: 1 | Status: SHIPPED | OUTPATIENT
Start: 2020-12-14 | End: 2021-06-09

## 2020-12-14 RX ORDER — GLIPIZIDE 10 MG/1
10 TABLET ORAL DAILY
Status: ON HOLD | COMMUNITY
Start: 2020-09-30 | End: 2022-06-24 | Stop reason: HOSPADM

## 2020-12-14 RX ORDER — CIPROFLOXACIN 500 MG/1
500 TABLET ORAL 2 TIMES DAILY
Status: ON HOLD | COMMUNITY
Start: 2020-12-04 | End: 2020-12-14

## 2020-12-14 RX ORDER — GABAPENTIN 100 MG/1
200 CAPSULE ORAL 2 TIMES DAILY
Qty: 120 CAPSULE | Refills: 1 | Status: ON HOLD | OUTPATIENT
Start: 2020-12-14 | End: 2023-10-16

## 2020-12-14 RX ORDER — DOXYCYCLINE 100 MG/1
100 CAPSULE ORAL 2 TIMES DAILY
Status: ON HOLD | COMMUNITY
Start: 2020-11-20 | End: 2020-12-14 | Stop reason: HOSPADM

## 2020-12-14 RX ADMIN — THERA TABS 1 TABLET: TAB at 08:12

## 2020-12-14 RX ADMIN — INSULIN ASPART 10 UNITS: 100 INJECTION, SOLUTION INTRAVENOUS; SUBCUTANEOUS at 12:12

## 2020-12-14 RX ADMIN — HYDRALAZINE HYDROCHLORIDE AND ISOSORBIDE DINITRATE 1 TABLET: 37.5; 2 TABLET, FILM COATED ORAL at 08:12

## 2020-12-14 RX ADMIN — LOSARTAN POTASSIUM 50 MG: 50 TABLET, FILM COATED ORAL at 04:12

## 2020-12-14 RX ADMIN — INSULIN ASPART 2 UNITS: 100 INJECTION, SOLUTION INTRAVENOUS; SUBCUTANEOUS at 08:12

## 2020-12-14 RX ADMIN — LOSARTAN POTASSIUM 50 MG: 50 TABLET, FILM COATED ORAL at 08:12

## 2020-12-14 RX ADMIN — ZINC SULFATE 220 MG (50 MG) CAPSULE 220 MG: CAPSULE at 08:12

## 2020-12-14 RX ADMIN — INSULIN ASPART 8 UNITS: 100 INJECTION, SOLUTION INTRAVENOUS; SUBCUTANEOUS at 08:12

## 2020-12-14 RX ADMIN — ENOXAPARIN SODIUM 40 MG: 40 INJECTION SUBCUTANEOUS at 08:12

## 2020-12-14 RX ADMIN — ASPIRIN 81 MG CHEWABLE TABLET 81 MG: 81 TABLET CHEWABLE at 08:12

## 2020-12-14 RX ADMIN — HYDRALAZINE HYDROCHLORIDE AND ISOSORBIDE DINITRATE 1 TABLET: 37.5; 2 TABLET, FILM COATED ORAL at 02:12

## 2020-12-14 RX ADMIN — TAMSULOSIN HYDROCHLORIDE 0.4 MG: 0.4 CAPSULE ORAL at 08:12

## 2020-12-14 RX ADMIN — INSULIN ASPART 3 UNITS: 100 INJECTION, SOLUTION INTRAVENOUS; SUBCUTANEOUS at 12:12

## 2020-12-14 RX ADMIN — HYDRALAZINE HYDROCHLORIDE 25 MG: 25 TABLET ORAL at 08:12

## 2020-12-14 RX ADMIN — GABAPENTIN 200 MG: 100 CAPSULE ORAL at 08:12

## 2020-12-14 NOTE — PT/OT/SLP PROGRESS
Physical Therapy Treatment    Patient Name:  Saji Castañeda   MRN:  [id number]    Recommendations:     Discharge Recommendations:  home health PT   Discharge Equipment Recommendations: none   Barriers to discharge: None    Assessment:     Saji Castañeda is a 70 y.o. male admitted with a medical diagnosis of 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.  He presents with the following impairments/functional limitations:  weakness, impaired endurance, gait instability, pain, impaired skin, decreased lower extremity function, orthopedic precautions, impaired functional mobilty, decreased safety awareness, impaired balance. Pt was without c/o L foot pain today and was able to increase amb with RW WBAT LLE to 22ft X 2 with SBA and IV pole management. SBA for sit<>stand transfer with RW from EOB. Pt will benefit from HHPT upon d/c when medically cleared.    Rehab Prognosis: Good; patient would benefit from acute skilled PT services to address these deficits and reach maximum level of function.    Recent Surgery: * No surgery found *      Plan:     During this hospitalization, patient to be seen 2 x/week to address the identified rehab impairments via gait training, therapeutic activities, therapeutic exercises, neuromuscular re-education, wheelchair management/training and progress toward the following goals:    · Plan of Care Expires:  01/08/21    Subjective     Chief Complaint: No complaints today  Patient/Family Comments/goals: Reports left foot and leg are much more comfortable allowing more wt to be put on it; will have brother and sister at home to help me  Pain/Comfort:  · Pain Rating 1: 0/10  · Pain Rating Post-Intervention 1: 0/10      Objective:     Communicated with RN prior to session.  Patient found sitting on EOB with telemetry, peripheral IV upon PT entry to room.     General Precautions: Standard, fall   Orthopedic Precautions:LLE weight bearing as tolerated   Braces: N/A     Functional  Mobility:  · Transfers:     · Sit to Stand:  stand by assistance with rolling walker  · Gait:   amb with RW WBAT LLE to 22ft X 2 with SBA and IV pole management; no c/o pain during amb ; not SOB or LOB; good rw management      AM-PAC 6 CLICK MOBILITY  Turning over in bed (including adjusting bedclothes, sheets and blankets)?: 4  Sitting down on and standing up from a chair with arms (e.g., wheelchair, bedside commode, etc.): 3  Moving from lying on back to sitting on the side of the bed?: 3  Moving to and from a bed to a chair (including a wheelchair)?: 3  Need to walk in hospital room?: 3  Climbing 3-5 steps with a railing?: 1  Basic Mobility Total Score: 17       Therapeutic Activities and Exercises:   Patient educated in/reviewed with:  - Plan of Care and goals prior to discharge and post d/c HHPT plans  -safety with transfers including hand placement  -gait sequencing and RW management  -OOB activity to maximize recovery including ambulating with family at home upon d/c        Patient left sitting EOB with all lines intact and call button in reach..    GOALS:   Multidisciplinary Problems     Physical Therapy Goals     Not on file          Multidisciplinary Problems (Resolved)        Problem: Physical Therapy Goal    Goal Priority Disciplines Outcome Goal Variances Interventions   Physical Therapy Goal   (Resolved)     PT, PT/OT Met     Description: Goals to be met by: 20     Patient will increase functional independence with mobility by performin. Supine to sit with Modified [place] - Not met  2. Sit to stand transfer with Supervision using RW - Not met  3. Gait  x 20 feet with Contact Guard Assistance using Rolling Walker - met  4. Wheelchair propulsion x 100 feet with Supervision using bilateral upper and lower extremities - Not met  5. Stand for 5 minutes with Stand-by Assistance using Rolling Walker or UE support surface as needed - Not met                   Time Tracking:     PT Received  On: 12/14/20  PT Start Time: 1457     PT Stop Time: 1509  PT Total Time (min): 12 min     Billable Minutes: Gait Training 12    Treatment Type: Treatment  PT/PTA: PT     PTA Visit Number: 0     Anuja Garcia, PT  12/14/2020

## 2020-12-14 NOTE — CARE UPDATE
Rapid Response Nurse Chart Check     Chart check completed, abnormal VS noted. bedside RNIrish contacted. No concerns verbalized at this time. Instructed to call [phone] for further concerns or assistance.

## 2020-12-14 NOTE — PT/OT/SLP PROGRESS
"Occupational Therapy Treatment    Patient Name:  Saji Castañeda   MRN:  [id number]  Admit Date: 12/8/2020  Admitting Diagnosis:  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   Length of Stay: 6 days  Recent Surgery: * No surgery found *      Recommendations:     Discharge Recommendations: home with home health  Discharge Equipment Recommendations:  none  Barriers to discharge:  None    Plan:     Patient to be seen 2 x/week to address the above listed problems via self-care/home management, therapeutic activities, therapeutic exercises  · Plan of Care Expires: 01/09/21  · Plan of Care Reviewed with: patient    Assessment:   Saji Castañeda is a 70 y.o. male with a medical diagnosis of 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.  He presents with the following performance deficits affecting function: weakness, impaired endurance, impaired self care skills, gait instability, decreased lower extremity function, decreased coordination, decreased safety awareness, impaired functional mobilty, orthopedic precautions, impaired skin.      Pt tolerated session well this date with time spent on discussion and education for safe discharge home anticipated for later this afternoon. Pt worked with PT prior to OT session and declined any additional needs at this time. Patient is progressing towards established goals, and continues to benefit from acute skilled OT services to increase functional performance and improve quality of life. OT to continue to recommend Home Health at D/C to improve pt functional independence and increase patient safety before returning home.      Rehab Prognosis: Good; patient would benefit from acute skilled OT services to address these deficits and reach maximum level of function.        Subjective   Communicated with: RN prior to session.  Patient found seated EOB with telemetry, peripheral IV upon OT entry to room. Pt agreeable to participate at this time.    Patient: "I feel good " "for the most part. I've been having this issue with the wounds. And fluid too."    Pain/Comfort:  · Pain Rating 1: 0/10  · Pain Rating Post-Intervention 1: 0/10    Objective:   Patient found with: telemetry, peripheral IV   General Precautions: Standard, Cardiac fall   Orthopedic Precautions:LLE weight bearing as tolerated   Braces: N/A   Oxygen Device: Room Air  Vitals: BP (!) 187/84 (BP Location: Left arm, Patient Position: Sitting)   Pulse 81   Temp 98 °F (36.7 °C) (Oral)   Resp 18   Ht 5' 9" (1.753 m)   Wt 136 kg (299 lb 13.2 oz)   SpO2 98%   BMI 44.28 kg/m²     Outcome Measures:  AMPAC 6 Click ADL: 21    Cognition:   · Oriented X 4 and Cooperative  · Command following: follows one-step commands  · Communication: clear/fluent    Occupational Performance:  Bed Mobility:    · Patient seated EOB with [place]    Functional Mobility/Transfers:   Static Sitting EOB: [place]   Dynamic Sitting EOB: Colbert    Activities of Daily Living:  · Pt declines any additional needs at this time, states that urinal is near by and he will get dressed when he is disconnected from his IV.     AMPAC 6 Click ADL:  AMPAC Total Score: 21    Treatment & Education:  -Pt education on OT role and POC.  -Importance of E/OOB activity with staff assistance, emphasis on daily participation  -Patient provided with education on importance of Bilateral UB/LB integration during functional tasks for improvement in functional performance.   -Education provided/reviewed, questions answered within OT scope of practice.   -Education provided and reviewed for transition to safe discharge home  -Patient demonstrates understanding and learning this date.         Patient left seated EOB with all lines intact and call button in reach    GOALS:   Multidisciplinary Problems     Occupational Therapy Goals     Not on file          Multidisciplinary Problems (Resolved)        Problem: Occupational Therapy Goal    Goal Priority Disciplines " Outcome Interventions   Occupational Therapy Goal   (Resolved)     OT, PT/OT Met    Description: Goals set on 12/9, with expiration date 12/23:  Patient will increase functional independence with ADLs by performing:    Bed mobility with Morgan  Grooming while seated at sink with Morgan  UB Dressing with Morgan.  LB Dressing with Morgan.  Toileting from toilet with stand pivot technique with Morgan  for hygiene and clothing management.   Functional mobility of household and community distance with Morgan and AD as needed  Pt will demonstrate understanding of education provided regarding energy conservation and task modification through teach-back method.                       Time Tracking:     OT Date of Treatment: 12/14/20  OT Start Time: 1523  OT Stop Time: 1533  OT Total Time (min): 10 min  Additional staff present: N/A      Billable Minutes:Therapeutic Activity 10 mins      JEANNE Prince  12/14/2020

## 2020-12-14 NOTE — PROGRESS NOTES
[facility]  Infectious Disease  Progress Note    Patient Name: Saji Castañeda  MRN: [id number]  Admission Date: 12/8/2020  Length of Stay: 6 days  Attending Physician: Kun Shook MD  Primary Care Provider: Lalo Heath MD    Isolation Status: No active isolations  Assessment/Plan:      * 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     70 y.o. 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 ID consulted for osteo of left foot.      Per Podiatry he has anterior and posterior wound that are both fairly deep.  The anterior wound has exposed bone with likely osteomyelitis.  An MRI tib/fib inconclusive.  He underwent bone biopsy on  12/9.  No surgical intervention indicated per podiatry. Wound gram stain with rare GPC.  Bone gram stain negative.  Both wound and bone cultures + for Proteus. Currently on cefepime/metrondiazole.  Vascular surgery is following. No acute surgical intervention at this time.  Afebrile, stable. No leukocytosis.     Recommendations  2. On cefazolin 6g continuous infusion  3. May discharge on ciprofloxacin 750 mg po bid x 6 weeks, EOC 1/20/2021. Please have pt follow up with wound care and podiatry (pt may see his podiatrist if preferred)   3. Please take ciprofloxacin 2 hours before or after mineral supplements, oral multivitamins, dairy products, or calcium-fortified juices due to food-drug interactions.  4. Will need weekly cbc, cmp, crp  6. Continue Leg elevation.   7. Discussed with ID staff, podiatry, and primary team. ID will sign off. Please call if with questions.              Thank you for your consult. I will sign off. Please contact us if you have any additional questions.    Raheem White PA-C  Infectious Disease  [facility]    Subjective:     Principal Problem:Chronic osteomyelitis of left ankle with draining  sinus    HPI: Saji Castañeda is a 70 y.o. male who  has a past medical history of Arthritis, Diabetes mellitus, Diabetes mellitus, type 2, Hyperlipidemia, and Osteomyelitis.     Patient admitted for worsening and nonhealing right and left lower extremity wounds.  The left is worse than right.  He follows with Podiatry at [facility].  He had been on antibiotics for the last 2 weeks with worsening of the wounds.  The was prompted to come to the ED for evaluation.  He denies any fever, nausea, vomiting or chills.  He admits to pain to bilateral lower extremities that is worse at night before going to sleep.  He reports the pain is a sharp and shooting pain.  He reports history of ambulation but has not been ambulatory since he developed the heel wound on the left lower extremity.    Per podiatry, Patient has bilateral lower extremity venous stasis ulcerations.  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  The anterior wound has exposed bone with likely osteomyelitis.     Interval History:     Wound cultures +proteus  Bone cultures gram stain +proteus  Blood cultures NGTD  Remained afebrile, stable, no acute complaints or concerns.     Review of Systems   Constitutional: Negative for activity change, appetite change, chills, diaphoresis, fatigue and fever.   Respiratory: Negative for cough and shortness of breath.    Cardiovascular: Negative for chest pain, palpitations and leg swelling.   Gastrointestinal: Negative for abdominal pain, diarrhea, nausea and vomiting.   Genitourinary: Negative for dysuria.   Musculoskeletal: Negative for back pain.   Skin: Positive for wound. Negative for color change and pallor.   Neurological: Negative for dizziness and headaches.   All other systems reviewed and are negative.    Objective:     Vital Signs (Most Recent):  Temp: 98 °F (36.7 °C) (12/14/20 1250)  Pulse: (!) 47 (12/14/20 1130)  Resp: 18 (12/14/20 0753)  BP: (!)  187/84 (12/14/20 0753)  SpO2: 98 % (12/14/20 0753) Vital Signs (24h Range):  Temp:  [96.6 °F (35.9 °C)-98.7 °F (37.1 °C)] 98 °F (36.7 °C)  Pulse:  [35-56] 47  Resp:  [15-21] 18  SpO2:  [92 %-98 %] 98 %  BP: (158-187)/(62-84) 187/84     Weight: 136 kg (299 lb 13.2 oz)  Body mass index is 44.28 kg/m².    Estimated Creatinine Clearance: 71.4 mL/min (based on SCr of 1.3 mg/dL).    Physical Exam  Vitals signs and nursing note reviewed.   Constitutional:       General: He is not in acute distress.     Appearance: Normal appearance. He is not ill-appearing, toxic-appearing or diaphoretic.   HENT:      Head: Normocephalic.      Nose: Nose normal.   Eyes:      Pupils: Pupils are equal, round, and reactive to light.   Cardiovascular:      Rate and Rhythm: Normal rate and regular rhythm.   Pulmonary:      Effort: Pulmonary effort is normal. No respiratory distress.      Breath sounds: Normal breath sounds. No stridor.   Abdominal:      General: Abdomen is flat. There is no distension.      Palpations: Abdomen is soft. There is no mass.      Tenderness: There is no abdominal tenderness.      Hernia: No hernia is present.   Musculoskeletal:         General: Tenderness present. No deformity or signs of injury.      Left lower leg: Edema present.   Skin:     General: Skin is warm and dry.      Coloration: Skin is not jaundiced or pale.      Comments: BLE wrapped at this time. Refer to media   Neurological:      General: No focal deficit present.      Mental Status: He is alert and oriented to person, place, and time.   Psychiatric:         Mood and Affect: Mood normal.         Significant Labs: All pertinent labs within the past 24 hours have been reviewed.    Significant Imaging: I have reviewed all pertinent imaging results/findings within the past 24 hours.

## 2020-12-14 NOTE — PLAN OF CARE
Pt 's dc today with follow up Dr. Holland at [facility] on 12/16/20 @0850 [phone]. Pt will cont services with [facility] (awaiting HH orders).   ID appointment 1/21/21 complete. CM attempted to secure appt for podiatry in one week , office will call with appt. Final note will follow when dc is complete.      Cherrie Wallace, MSN  Case Management  Ext [phone]

## 2020-12-14 NOTE — PLAN OF CARE
Upon DC:    Wound Care  - Dressings to be changed M and Th as follows: Remove old dressings to both legs. Cleanse thoroughly with wound cleanser and dry.   - Left leg - pack the anterior and posterior foot wounds with luciana moistened with saline and cover with nicolasa foam. Apply hydrafera classic moistened with saline to the left leg wounds. Secure with megan. Wrap from the foot to the knee with cast padding and secure with ACE with moderate compression. Apply flexinet over the ACE.  - Right leg - Apply hydrafera classic moistened with saline to the left leg wounds. Secure with megan. Wrap from the foot to the knee with cast padding and secure with ACE with moderate compression. Apply flexinet over the ACE    Dressings can be changed more frequently depending on the amount of drainage.     Follow up with Podiatry within 1 week upon DC from facility.

## 2020-12-14 NOTE — PLAN OF CARE
12/14/20 0859   Post-Acute Status   Post-Acute Authorization Placement   Post-Acute Placement Status Referrals Sent     Pt accepted by [facility], submitted to [employer]'s [employer]. Sw met with Pt, agreeable to [facility], will follow in the pm, nurse Francesco diaz. P2P requested by insurance, staff aware, Sw to follow.

## 2020-12-14 NOTE — PROGRESS NOTES
Reviewed with staff. Will plan for angiogram in the near future as OR scheduling permits.    Sammy Cameron MD  Vascular Fellow PGY6

## 2020-12-14 NOTE — ASSESSMENT & PLAN NOTE
70 y.o. 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 ID consulted for osteo of left foot.      Per Podiatry he has anterior and posterior wound that are both fairly deep.  The anterior wound has exposed bone with likely osteomyelitis.  An MRI tib/fib inconclusive.  He underwent bone biopsy on  12/9.  No surgical intervention indicated per podiatry. Wound gram stain with rare GPC.  Bone gram stain negative.  Both wound and bone cultures + for Proteus. Currently on cefepime/metrondiazole.  Vascular surgery is following. No acute surgical intervention at this time.  Afebrile, stable. No leukocytosis.     Recommendations  2. On cefazolin 6g continuous infusion  3. May discharge on ciprofloxacin 750 mg po bid x 6 weeks, EOC 1/20/2021. Please have pt follow up with wound care and podiatry (pt may see his podiatrist if preferred)   3. Please take ciprofloxacin 2 hours before or after mineral supplements, oral multivitamins, dairy products, or calcium-fortified juices due to food-drug interactions.  4. Will need weekly cbc, cmp, crp  6. Continue Leg elevation.   7. Discussed with ID staff, podiatry, and primary team. ID will sign off. Please call if with questions.

## 2020-12-14 NOTE — PLAN OF CARE
Pt alert. V/s stable. Ancef infusing continuously. Weight shift assistance provided. Uses wheelchair to maneuver independently. Dressing clean, dry, intact. No complaints of pain or discomfort at this time. Will continue to monitor and interventions as appropriate.     Problem: Fall Injury Risk  Goal: Absence of Fall and Fall-Related Injury  Outcome: Ongoing, Progressing     Problem: Adult Inpatient Plan of Care  Goal: Plan of Care Review  Outcome: Ongoing, Progressing     Problem: Adult Inpatient Plan of Care  Goal: Absence of Hospital-Acquired Illness or Injury  Outcome: Ongoing, Progressing     Problem: Adult Inpatient Plan of Care  Goal: Optimal Comfort and Wellbeing  Outcome: Ongoing, Progressing

## 2020-12-14 NOTE — SUBJECTIVE & OBJECTIVE
Interval History:     Wound cultures +proteus  Bone cultures gram stain +proteus  Blood cultures NGTD  Remained afebrile, stable, no acute complaints or concerns.     Review of Systems   Constitutional: Negative for activity change, appetite change, chills, diaphoresis, fatigue and fever.   Respiratory: Negative for cough and shortness of breath.    Cardiovascular: Negative for chest pain, palpitations and leg swelling.   Gastrointestinal: Negative for abdominal pain, diarrhea, nausea and vomiting.   Genitourinary: Negative for dysuria.   Musculoskeletal: Negative for back pain.   Skin: Positive for wound. Negative for color change and pallor.   Neurological: Negative for dizziness and headaches.   All other systems reviewed and are negative.    Objective:     Vital Signs (Most Recent):  Temp: 98 °F (36.7 °C) (12/14/20 1250)  Pulse: (!) 47 (12/14/20 1130)  Resp: 18 (12/14/20 0753)  BP: (!) 187/84 (12/14/20 0753)  SpO2: 98 % (12/14/20 0753) Vital Signs (24h Range):  Temp:  [96.6 °F (35.9 °C)-98.7 °F (37.1 °C)] 98 °F (36.7 °C)  Pulse:  [35-56] 47  Resp:  [15-21] 18  SpO2:  [92 %-98 %] 98 %  BP: (158-187)/(62-84) 187/84     Weight: 136 kg (299 lb 13.2 oz)  Body mass index is 44.28 kg/m².    Estimated Creatinine Clearance: 71.4 mL/min (based on SCr of 1.3 mg/dL).    Physical Exam  Vitals signs and nursing note reviewed.   Constitutional:       General: He is not in acute distress.     Appearance: Normal appearance. He is not ill-appearing, toxic-appearing or diaphoretic.   HENT:      Head: Normocephalic.      Nose: Nose normal.   Eyes:      Pupils: Pupils are equal, round, and reactive to light.   Cardiovascular:      Rate and Rhythm: Normal rate and regular rhythm.   Pulmonary:      Effort: Pulmonary effort is normal. No respiratory distress.      Breath sounds: Normal breath sounds. No stridor.   Abdominal:      General: Abdomen is flat. There is no distension.      Palpations: Abdomen is soft. There is no mass.       Tenderness: There is no abdominal tenderness.      Hernia: No hernia is present.   Musculoskeletal:         General: Tenderness present. No deformity or signs of injury.      Left lower leg: Edema present.   Skin:     General: Skin is warm and dry.      Coloration: Skin is not jaundiced or pale.      Comments: BLE wrapped at this time. Refer to media   Neurological:      General: No focal deficit present.      Mental Status: He is alert and oriented to person, place, and time.   Psychiatric:         Mood and Affect: Mood normal.         Significant Labs: All pertinent labs within the past 24 hours have been reviewed.    Significant Imaging: I have reviewed all pertinent imaging results/findings within the past 24 hours.

## 2020-12-14 NOTE — PLAN OF CARE
Pt 's dc today with follow up Dr. Holland at [facility] on 12/16/20 @0850 [phone]. Pt will cont services with [facility] (awaiting HH orders).   ID appointment 1/21/21 complete. CM attempted to secure appt for podiatry in one week , office will call with appt. Final note will follow when dc is complete.      Future Appointments   Date Time Provider Department Center   12/29/2020 10:15 AM ADAM Jauregui III, MD [facility] JONATHANUR Aaron [facility]   1/21/2021 11:30 AM Huy Melendez MD [facility] ID Aaron y          12/14/20 [id number]   Final Note   Assessment Type Final Discharge Note   Anticipated Discharge Disposition Home-Health   Hospital Follow Up  Appt(s) scheduled? Yes   Post-Acute Status   Post-Acute Authorization Home Health   Home Health Status Set-up Complete   Discharge Delays None known at this time   Cherrie Wallace, MSN  Case Management  Ext [phone]

## 2020-12-14 NOTE — PROGRESS NOTES
[facility] Hospital Medicine  Progress Note    Patient Name: Saji Castañeda  MRN: [id number]  Patient Class: IP- Inpatient   Admission Date: 12/8/2020  Length of Stay: 5 days  Attending Physician: Kun Shook MD  Primary Care Provider: Lalo Heath MD    [facility] Medicine Team: [facility] A Kun Shook MD    Subjective:     Principal Problem:Osteomyelitis    Interval History:   Lab holiday    Discuss final care plans with ID/vascular surgery     Sensitivity back on Proteus culture will have to discuss final abx choice with ID.  Possible that pt could take Cipro Oral therapy      Review of Systems   Constitutional: Negative for appetite change and fever.   Respiratory: Negative for chest tightness and shortness of breath.    Cardiovascular: Negative for chest pain.   Gastrointestinal: Negative for constipation.   Genitourinary: Negative for dysuria.     Objective:     Vital Signs (Most Recent):  Temp: 97.3 °F (36.3 °C) (12/13/20 1459)  Pulse: (!) 51 (12/13/20 1927)  Resp: (!) 21 (12/13/20 0710)  BP: (!) 172/77 (12/13/20 1459)  SpO2: 97 % (12/13/20 1459) Vital Signs (24h Range):  Temp:  [97.3 °F (36.3 °C)-99 °F (37.2 °C)] 97.3 °F (36.3 °C)  Pulse:  [41-58] 51  Resp:  [16-21] 21  SpO2:  [92 %-98 %] 97 %  BP: (147-175)/(63-77) 172/77   Intake/Outake:  This Shift:  No intake/output data recorded.    Net I/O past 24h:     Intake/Output Summary (Last 24 hours) at 12/13/2020 1931  Last data filed at 12/13/2020 1700  Gross per 24 hour   Intake 1090 ml   Output 1600 ml   Net -510 ml         Weight: 136 kg (299 lb 13.2 oz)  Body mass index is 44.28 kg/m².  Physical Exam  Constitutional:       General: He is not in acute distress.     Appearance: He is obese. He is not toxic-appearing.   Cardiovascular:      Rate and Rhythm: Normal rate and regular rhythm.      Pulses: Normal pulses.      Heart sounds: No murmur.   Pulmonary:      Effort: Pulmonary effort is normal.      Breath sounds: No  rales.   Abdominal:      General: Abdomen is flat. There is no distension.      Palpations: Abdomen is soft.      Tenderness: There is no abdominal tenderness.   Musculoskeletal:      Comments: S/p left BKA, distal end of leg in dressing  RLE/Foot dry skin, shin in wound dressing    Skin:     General: Skin is dry.   Neurological:      Mental Status: He is alert and oriented to person, place, and time.           Significant Labs:   CBC:   Recent Labs   Lab 12/12/20  0616   WBC 6.19   HGB 9.2*   HCT 31.1*        CMP:   Recent Labs   Lab 12/12/20  0616      K 3.9      CO2 26   *   BUN 15   CREATININE 1.2   CALCIUM 8.3*   PROT 6.3   ALBUMIN 2.1*   BILITOT 0.3   ALKPHOS 56   AST 21   ALT 15   ANIONGAP 8   EGFRNONAA >60.0       Significant Imaging: I have reviewed all pertinent imaging results/findings within the past 24 hours.    CTA RUNOFF ABD PEL BILAT LOWER EXT     CLINICAL HISTORY:  Ankle swelling/redness, cellulitis suspected;Peripheral arterial disease (PAD), prior revasc, symptomatic;     TECHNIQUE:  CTA of the abdomen, pelvis and bilateral lower extremities was performed following the intravenous administration of 125 cc Omnipaque 350.  Axial sagittal and coronal reformatted images submitted.     COMPARISON:  CT abdomen pelvis without contrast February 2012     FINDINGS:  Evaluation of the arterial system demonstrates celiac and SMA to be patent.  Likewise right and left renal arteries are patent.  Visualized GEORGIE is patent.     On the right the right common iliac internal and external iliac arteries are patent.  Right common femoral as well as the superficial and profundus femoral arteries are patent.  There is suboptimal contrast opacification.  There is some calcified plaque in the SFA with moderate narrowing in the proximal popliteal artery at least 50% diameter narrowing.  Scattered calcified plaque in the more distal popliteal though no high-grade stenosis.  Again moderate narrowing  in the distal popliteal just proximal to the trifurcation.  There is significant plaque at the origin of the right anterior tibial artery perhaps focal occlusion.  Significant calcified plaque in the tibioperoneal trunk.  There is significant calcified plaque in the peroneal artery.  Anterior tibial artery appears to be the dominant supply to the right foot.     On the left, common iliac as well as internal and external iliac arteries are patent.  No significant stenoses.  Left common femoral as well as the superficial and profundus femoral arteries are patent.  There is scattered plaque in the left SFA approximately 50% narrowing distally.  There is moderate narrowing in the adductor canal extending into the popliteal artery.  Moderate calcified plaque in the popliteal with 50-70% diameter narrowing at the level of femoral condyle.  Plaque extends caudally.  Again there is significant plaque in the distal popliteal at the trifurcation.  There is calcified plaque in all 3 calf vessels.  Again the anterior tibial appears to be the dominant supply with significant calcified plaque in the posterior tibial and peroneal arteries.  More distally all 3 vessels appear patent.  There is calcified plaque in the dorsalis pedis and posterior tibial.  Amputation left foot at the distal tarsal level.  Soft tissue defect anteriorly noted.  There is significant edema bilaterally.     In the chest no significant pleural or pericardial fluid.  Significant respiratory motion artifact.  No confluent consolidation.     In the abdomen liver is normal in size.  No focal masses is visualized on this CTA.  Some high-density about the periphery of the gallbladder wall may be early enhancement.  Pancreas is normal in size and contour.  Spleen not enlarged.  No adrenal masses.  Hypodensity left kidney consistent with a cyst.  3 mm left lower pole renal calculus.     No significant para-aortic adenopathy.  IVC filter noted.  Nonenlarged pelvic  nodes bilaterally.  Likewise bilateral inguinal nodes.  6.5 cm hypodensity in the right hemiscrotum.  Nonemergent ultrasound suggested.  There appears to be a soft tissue component inferiorly.  Prostate mildly enlarged.  Calcification abuts the anterior aspect of the bladder though was present on the prior study.     Evaluation of the bowel demonstrates feces in the colon.  Eventration of the anterior abdominal wall.  No focal bowel dilatation.  No convincing mesenteric adenopathy.     There is some atrophic changes in the bilateral lower extremity musculature.  Soft tissue stranding and edematous changes particularly in the calves though does extend into the thighs.  Skin defect abutting the left forefoot amputation.     Evaluation of the bones demonstrate hypertrophic degenerative changes.  Fusion of the SI joints.  There is some cortical thickening and periosteal new bone about the distal right fibula likely posttraumatic.  Postop change about the left foot with some osseous irregularity at the resection site.  Bones are demineralized.  There is fusion of the left tibia and fibula superiorly.     Impression:     Suboptimal opacification of the arterial system.  There is moderate plaque in the SFA and popliteal arteries bilaterally.  Significant three-vessel calf disease though the dominant supply appears to be the anterior tibial artery bilaterally.  Details as above.     Postop amputation of the left forefoot likely at the distal tarsal bones.  There appears to be some irregularity of the residual navicula and cuboid within adjacent soft tissue defect.  Osteomyelitis not excluded.  MRI left foot may be helpful.     Bilateral lower extremity edema/cellulitis.     Probable posttraumatic change right fibula.     6.5 cm hypodensity right hemiscrotum.  This may be a hydrocele.  Ultrasound may be helpful.     Additional findings as above.     This report was flagged in Epic as abnormal.        Electronically signed  by: Harris Espino MD  Date:                                            12/11/2020    MEDICATIONS  Scheduled Meds:   aspirin  81 mg Oral Daily    atorvastatin  40 mg Oral QHS    ceFEPime (MAXIPIME) IVPB  2 g Intravenous Q8H    enoxaparin  40 mg Subcutaneous Q12H    gabapentin  200 mg Oral BID    insulin aspart U-100  8 Units Subcutaneous TIDWM    insulin detemir U-100  25 Units Subcutaneous QHS    isosorbide-hydrALAZINE 20-37.5 mg  1 tablet Oral TID    losartan  50 mg Oral Daily    multivitamin  1 tablet Oral Daily    senna-docusate 8.6-50 mg  1 tablet Oral Daily    tamsulosin  0.4 mg Oral Daily    zinc sulfate  220 mg Oral Daily                             Continuous Infusions:  PRN Meds:.acetaminophen, bisacodyL, dextrose 50%, dextrose 50%, glucagon (human recombinant), glucose, glucose, hydrALAZINE, HYDROcodone-acetaminophen, insulin aspart U-100, melatonin, morphine, ondansetron, sodium chloride 0.9%    VTE Risk Mitigation (From admission, onward)         Ordered     enoxaparin injection 40 mg  Every 12 hours      12/10/20 1027     IP VTE HIGH RISK PATIENT  Once      12/08/20 1629     Place sequential compression device  Until discontinued      12/08/20 1629                Assessment/Plan:     Overview/Hospital Course:     71 y/o AAM hx of Type 2 DM, HLD, PAD s/p Left BKA who is admitted with bilateral LE wounds and concern for LLE osteomyelitis secondary poorly healing wounds/ulcers.     1. Acute osteomyelitis   -s/p Bone Marrow Biopsy LLE (12/09)  -Left LEg Wound with Preoteus Species on bone and wound cultures   >ID discontinued vancomycin & flagyl   >Continue Cefepime pending final ID recs.   - Podiatry consults following, managing wound care to Left leg and RLE    2. Peripheral arterial disease   -vascular surg recs 3 pillow elevation of legs at all times   >-Recommend elevation of bilateral extremities and compression stockings 40mmHg  -vascular ordered CTA with runoff, some suboptimal study  quality reported but f/u this weekend with vascular surgery re: any potential angiography on Monday 12/14.       3. Type 2 Diabetes Mellitus, uncontrolled w/neuropathy   -continue aspart 8 units TID and basal insulin 25 units nightly  -well controlled glucose levels at this time.   -A1c 7.9 @ admission.   -Continue Gabapentin 200mg PO BID, if pt tolerating well consider titration in coming days.     4. Essential Hypertension   -continue on BIDIL, Losartan      5. BPH   -continue on home flomax.         Code Status: Full Code    High Risk Conditions:  Patient has a condition that poses threat to life and bodily function: Acute Osteomyelitis  Patient is currently on drug therapy requiring intensive monitoring for toxicity: Vancomycin    Discharge Planning   OXANA: 12/15/2020     Code Status: Full Code   Is the patient medically ready for discharge?: No    Reason for patient still in hospital (select all that apply): Patient trending condition, Laboratory test, Treatment, Imaging and Consult recommendations  Discharge Plan A: Home, Home Health          Active Hospital Problems    Diagnosis  POA    *Osteomyelitis [M86.9]  Yes     Priority: 1 - High    Peripheral arterial disease [I73.9]  Yes     Priority: 2      Chronic    Wound, open, foot with complication [S91.309A]  Yes     Priority: 2     Type 2 diabetes, uncontrolled, with neuropathy [E11.40, E11.65]  Yes     Priority: 3      Chronic    Diabetic neuropathy [E11.40]  Yes     Priority: 4      Chronic    Edema of leg [R60.0]  Yes     Priority: 5      Chronic    Mobitz (type) I (Wenckebach's) atrioventricular block [I44.1]  Yes     Priority: 6     Leg wound, left [S81.802A]  Yes    Essential hypertension [I10]  Yes     Chronic      Resolved Hospital Problems   No resolved problems to display.                 Kun Shook M.D.  Attending Physician  [facility] Medicine Dept.  Pager: [phone]  [facility]  [id number]

## 2020-12-14 NOTE — PLAN OF CARE
[facility]    HOME HEALTH ORDERS  FACE TO FACE ENCOUNTER    Patient Name: Saji Castañeda  YOB: 1950    PCP: Lalo Heath MD   PCP Address: [facility] [facility] / [place]  PCP Phone Number: [phone]  PCP Fax: [phone]    Encounter Date: 12/14/2020    Admit to Home Health    Diagnoses:  Active Hospital Problems    Diagnosis  POA    *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 [M86.472]  Yes     Priority: 1 - High    Peripheral arterial disease [I73.9]  Yes     Priority: 2      Chronic    Wound, open, foot with complication [S91.309A]  Yes     Priority: 2     Type 2 diabetes, uncontrolled, with neuropathy [E11.40, E11.65]  Yes     Priority: 3      Chronic    Diabetic neuropathy [E11.40]  Yes     Priority: 4      Chronic    Edema of leg [R60.0]  Yes     Priority: 5      Chronic    Mobitz (type) I (Wenckebach's) atrioventricular block [I44.1]  Yes     Priority: 6     Leg wound, left [S81.802A]  Yes    Essential hypertension [I10]  Yes     Chronic      Resolved Hospital Problems   No resolved problems to display.       No future appointments.  Follow-up Information     Serge Holland MD.    Specialty: Internal Medicine  Why: Hospital follow up @12/16/20 @0895  Contact information:  [facility] [facility]  [place] [id number]  [phone]             [id number]    Specialty: Podiatry  Why: Hospital follow up in one week, office will call with appointment  Contact information:  [address]  [facility] [address] [phone]  Additional information:  [facility] - Main Building, 5th Floor   Please park in [facility] and use [facility] elevator                   I have seen and examined this patient face to face today. My clinical findings that support the need for the home health skilled services and home bound status are the following:  Weakness/numbness  causing balance and gait disturbance due to Weakness/Debility and Left Lower LImb Osteomyelitis making it taxing to leave home.  Requiring assistive device to leave home due to unsteady gait caused by  Infection, Weakness/Debility and Left Lower Limb Osteomyelitis.  Patient with medication mismanagement issues requiring home bound status as evidenced by  Poor understanding of medication regimen/dosage.  Medical restrictions requiring assistance of another human to leave home due to  Wound care needs and Weight bearing restricted.    Allergies:Review of patient's allergies indicates:  No Known Allergies    Diet: diabetic diet: 2000 calorie    Activities: activity as tolerated    Nursing:   SN to complete comprehensive assessment including routine vital signs. Instruct on disease process and s/s of complications to report to MD. Review/verify medication list sent home with the patient at time of discharge  and instruct patient/caregiver as needed. Frequency may be adjusted depending on start of care date.    Notify MD if SBP > 160 or < 90; DBP > 90 or < 50; HR > 120 or < 50; Temp > 101; Other:  New or Worsening changes to Left Leg Wound.       CONSULTS:    Physical Therapy to evaluate and treat. Evaluate for home safety and equipment needs; Establish/upgrade home exercise program. Perform / instruct on therapeutic exercises, gait training, transfer training, and Range of Motion.  Occupational Therapy to evaluate and treat. Evaluate home environment for safety and equipment needs. Perform/Instruct on transfers, ADL training, ROM, and therapeutic exercises.   to evaluate for community resources/long-range planning.  Aide to provide assistance with personal care, ADLs, and vital signs.    MISCELLANEOUS CARE:  Diabetic Care:   SN to perform and educate Diabetic management with blood glucose monitoring:, Fingerstick blood sugar a.m. and p.m. and Report CBG < 60 or > 350 to physician.    LABORATORY  1. DRAW  CBC, CMP, CRP, ESR WEEKLY -   2. FAX RESULTS to [facility] @ [phone].   ATTN - TUSUE NEW PA-C        WOUND CARE ORDERS  yes:  Foot Ulcer:  Location: Left Lower Extremity BKA Stump  FREQUENCY - CHANGE TWICE WEEKLY - Monday & Thursday  1. Remove old dressings to both legs. Cleanse thoroughly with wound cleanser and dry.   2. pack the anterior and posterior foot wounds with luciana moistened with saline  3. Cover with nicolasa foam.   4. Apply hydrafera classic moistened with saline to the left leg wounds.   5. Secure with megan.   6.Wrap from the foot to the knee with cast padding and secure with ACE with moderate compression.   7. Apply flexinet over the ACE.      Yes - Leg Ulcer   Location - RIght Lower leg   FREQUENCY - CHANGE TWICE WEEKLY - Monday & Thursday  1. Remove old dressings to both legs. Cleanse thoroughly with wound cleanser and dry.   2. Apply hydrafera classic moistened with saline to the left leg wounds.   3. Secure with megan.   4. Wrap from the foot to the knee with cast padding   5. secure with ACE with moderate compression.   6. Apply flexinet over the ACE    Dressings can be changed more frequently depending on the amount of drainage.     Medications: Review discharge medications with patient and family and provide education.      Current Discharge Medication List      START taking these medications    Details   acetaminophen (TYLENOL) 325 MG tablet Take 2 tablets (650 mg total) by mouth every 6 (six) hours as needed for Pain.  Qty:  , Refills: 0      gabapentin (NEURONTIN) 100 MG capsule Take 2 capsules (200 mg total) by mouth 2 (two) times daily.  Qty: 120 capsule, Refills: 1      HYDROcodone-acetaminophen (NORCO) 5-325 mg per tablet Take 1 tablet by mouth every 8 (eight) hours as needed (Severe Foot/Leg pain).  Qty: 21 tablet, Refills: 0    Comments: Quantity prescribed more than 7 day supply? No      isosorbide-hydrALAZINE 20-37.5 mg (BIDIL) 20-37.5 mg Tab Take  "1 tablet by mouth 3 (three) times daily.  Qty: 90 tablet, Refills: 2      multivitamin Tab Take 1 tablet by mouth once daily.  Qty:        zinc sulfate (ZINCATE) 220 (50) mg capsule Take 1 capsule (220 mg total) by mouth once daily.  Qty:           CONTINUE these medications which have CHANGED    Details   atorvastatin (LIPITOR) 40 MG tablet Take 1 tablet (40 mg total) by mouth every evening.  Qty: 30 tablet, Refills: 1      ciprofloxacin HCl (CIPRO) 750 MG tablet Take 1 tablet (750 mg total) by mouth every 12 (twelve) hours. Please take 2 hours before or after mineral supplements, oral multivitamins, dairy products, or calcium-fortified juices due to food-drug interactions.  Qty: 76 tablet, Refills: 0    Comments: Left Lower Limb osteomyelitis      losartan (COZAAR) 50 MG tablet Take 1 tablet (50 mg total) by mouth 2 (two) times a day.  Qty: 60 tablet, Refills: 1    Comments: .         CONTINUE these medications which have NOT CHANGED    Details   aspirin (ECOTRIN) 81 MG EC tablet Take 81 mg by mouth once daily.      BD ULTRA-FINE ADITYA PEN NEEDLE 32 gauge x 5/32" Ndle USE FOUR TIMES DAILY WITH MEALS AND NIGHTLY  Qty: 100 each, Refills: 0    Associated Diagnoses: Type II diabetes mellitus, uncontrolled      blood sugar diagnostic (CONTOUR TEST STRIPS) Strp Inject 1 strip into the skin 2 (two) times daily before meals.  Qty: 100 strip, Refills: 6    Comments: To use with Contour Ascencia meter  Associated Diagnoses: Type II diabetes mellitus, uncontrolled      dimethicon/petrolat/A,C,E/aloe (GOLD BOND ULTIMATE DIABETICS' TOP) Apply 1 application topically every evening. Gold mahoney diabetic lotion      glipiZIDE (GLUCOTROL) 10 MG tablet Take 10 mg by mouth daily with breakfast. HOLD THIS MEDICATION IF YOU ARE SKIPPING A MEAL      insulin (LANTUS SOLOSTAR U-100 INSULIN) glargine 100 units/mL (3mL) SubQ pen Inject 68 Units into the skin every evening.       metFORMIN (GLUCOPHAGE-XR) 500 MG ER 24hr tablet Take 500 mg by " mouth once daily.      MICROLET LANCET Misc Refills: 0         STOP taking these medications       metformin (GLUCOPHAGE) 500 MG tablet Comments:   Reason for Stopping: REPLACED BY DAILY FORMULATION        doxycycline (MONODOX) 100 MG capsule Comments:   Reason for Stopping: REPLACED BY CURRENT ABX              I certify that this patient is confined to his home and needs intermittent skilled nursing care, physical therapy and occupational therapy.        Kun Shook M.D.  Attending Physician  [facility] Medicine Dept.

## 2020-12-15 ENCOUNTER — TELEPHONE (OUTPATIENT)
Dept: WOUND CARE | Facility: CLINIC | Age: 70
End: 2020-12-15

## 2020-12-15 LAB
BACTERIA SPEC ANAEROBE CULT: ABNORMAL
BACTERIA SPEC ANAEROBE CULT: NORMAL

## 2020-12-15 NOTE — TELEPHONE ENCOUNTER
Call no answer left voice message asking patient to return call regarding message from  for appointment with wound care in one week.

## 2020-12-15 NOTE — TELEPHONE ENCOUNTER
----- Message from Yudi Dent RN sent at 12/14/2020  5:02 PM CST -----  Contact: [facility] case [facility] @ [id number]  Vickie,    See below.    Yudi  ----- Message -----  From: Tosin Pitts  Sent: 12/14/2020   3:13 PM CST  To: Jojo Powers Staff    Please call pt at [phone]    Requesting a hospital discharge wound care appt within one week    Thank you

## 2020-12-15 NOTE — PT/OT/SLP DISCHARGE
Occupational Therapy Discharge Summary    Saji Castañeda  MRN: [id number]   Principal Problem: 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      Patient Discharged from acute Occupational Therapy on 12/14/2020.  Please refer to prior OT note dated 12/14/2020 for functional status.    Assessment:      Patient appropriate for care in another setting.    Objective:     GOALS:   Multidisciplinary Problems     Occupational Therapy Goals     Not on file          Multidisciplinary Problems (Resolved)        Problem: Occupational Therapy Goal    Goal Priority Disciplines Outcome Interventions   Occupational Therapy Goal   (Resolved)     OT, PT/OT Met    Description: Goals set on 12/9, with expiration date 12/23:  Patient will increase functional independence with ADLs by performing:    Bed mobility with [place]  Grooming while seated at sink with [place]  UB Dressing with [place].  LB Dressing with [place].  Toileting from toilet with stand pivot technique with [place]  for hygiene and clothing management.   Functional mobility of household and community distance with [place] and AD as needed  Pt will demonstrate understanding of education provided regarding energy conservation and task modification through teach-back method.                     Established OT goals not achieved prior to discharge.    Reasons for Discontinuation of Therapy Services  Transfer to alternate level of care.      Plan:     Patient Discharged to: Home with Home Health Service    Anabel Michaud OT  12/15/2020

## 2020-12-15 NOTE — TELEPHONE ENCOUNTER
----- Message from Yudi Dent RN sent at 12/14/2020  5:01 PM CST -----  Contact: [facility] case [employer] @ [id number]  See below.  Yudi  ----- Message -----  From: Tosin Pitts  Sent: 12/14/2020   3:13 PM CST  To: Jojo Powers Staff    Please call pt at [phone]    Requesting a hospital discharge wound care appt within one week    Thank you

## 2020-12-15 NOTE — TELEPHONE ENCOUNTER
Patient returned call and accepted appointment date/time for Monday, 12/21/2020 at 11am for hospital f/u with wound care clinic.

## 2020-12-16 ENCOUNTER — PATIENT OUTREACH (OUTPATIENT)
Dept: ADMINISTRATIVE | Facility: CLINIC | Age: 70
End: 2020-12-16

## 2020-12-16 LAB — POCT GLUCOSE: 235 MG/DL (ref 70–110)

## 2020-12-16 NOTE — TELEPHONE ENCOUNTER
C3 nurse attempted to contact patient. The following occurred:   C3 nurse attempted to contact Saji Castañeda for a TCC post hospital discharge follow up call. Call cancelled.  The patient had a scheduled HOSFU appointment with [facility] on 12/16/2020. Patient PCP is not within [facility].

## 2020-12-21 ENCOUNTER — OFFICE VISIT (OUTPATIENT)
Dept: WOUND CARE | Facility: CLINIC | Age: 70
End: 2020-12-21
Payer: MEDICARE

## 2020-12-21 VITALS
BODY MASS INDEX: 44.28 KG/M2 | DIASTOLIC BLOOD PRESSURE: 65 MMHG | TEMPERATURE: 99 F | WEIGHT: 299 LBS | SYSTOLIC BLOOD PRESSURE: 180 MMHG | HEART RATE: 45 BPM | HEIGHT: 69 IN

## 2020-12-21 DIAGNOSIS — S91.309A: Primary | ICD-10-CM

## 2020-12-21 PROCEDURE — 99999 PR PBB SHADOW E&M-EST. PATIENT-LVL V: CPT | Mod: PBBFAC,,, | Performed by: NURSE PRACTITIONER

## 2020-12-21 PROCEDURE — 99213 PR OFFICE/OUTPT VISIT, EST, LEVL III, 20-29 MIN: ICD-10-PCS | Mod: S$GLB,,, | Performed by: NURSE PRACTITIONER

## 2020-12-21 PROCEDURE — 99215 OFFICE O/P EST HI 40 MIN: CPT | Mod: PBBFAC | Performed by: NURSE PRACTITIONER

## 2020-12-21 PROCEDURE — 99213 OFFICE O/P EST LOW 20 MIN: CPT | Mod: S$GLB,,, | Performed by: NURSE PRACTITIONER

## 2020-12-21 PROCEDURE — 99999 PR PBB SHADOW E&M-EST. PATIENT-LVL V: ICD-10-PCS | Mod: PBBFAC,,, | Performed by: NURSE PRACTITIONER

## 2020-12-21 NOTE — DISCHARGE SUMMARY
[facility] Hospital Medicine  Discharge Summary      Patient Name: Saji Castañeda  MRN: [id number]  Admission Date: 12/8/2020  Hospital Length of Stay: 6 days  Discharge Date and Time: 12/14/2020  5:32 PM  Attending Physician: Kun Shook MD  Discharging Provider: Kun Shook MD  Primary Care Provider: Lalo Heath MD    [facility] Medicine Team: [facility] [place] [facility] MED A Kun Shook MD    HPI: Patient is a 70 y.o. male past medical hx of DM2 insulin deendent, HLP, HTN and peripheral venouse stasis, PVD and prior left foot osteomyelitis requiring amputation presenting with 2 weeks of worsening wounds to left LE - particular issue with heel wound and anterior wound a the stump site. Bone is exposed on the anterior wound. Patient to admitted for osteomyelitis evaluation. MRI is concerning for osteomyelitis. Podiatry with bone biopsy today and redressing all wounds. Infectious disease consulted - cefepime and flagyl. U/S arterial bilateral lower extremities with PVD but no surgical intervention by vascular surgery.     * No surgery found *      Hospital Course:   69 y/o AAM hx of Type 2 DM, HLD, PAD s/p Left BKA who is admitted with bilateral LE wounds and concern for LLE osteomyelitis secondary poorly healing wounds/ulcers. He was evalauted by podiatry, infectious diseases & vascular surgery consultants.  He had bone biopsy of the left leg wound/ulcer and tissue cultures sent that revealed a Proteus species bacteria.  Initially he was started on broad spectrum IV antibiotics Vancomycin/Cefepime and Flagyl and after final culture and sensitivity returned, antibiotics were narrowed to oral Ciprofloxacin per ID team for long term course.     Vascular surgery evaluated patient and he had vascular ultrasound/CT studies but final recs for outpatient f/u no inpatient angiography or procedure needed.     Podiatry provided wound care management inpatient and gave final discharge wound care recs which  were continued on his home health orders, and he was recommended to follow up with podiatry as an outpatient.     For lower extremity pain he was started on gabapentin low dose 200mg BID which was helping his symptoms and can be uptitrated as an outpatient if needed.     Hypertension was stable on his home medication regimen.       Physical Exam  Constitutional:       General: He is not in acute distress.     Appearance: He is obese. He is not toxic-appearing.   Cardiovascular:      Rate and Rhythm: Normal rate and regular rhythm.      Pulses: Normal pulses.      Heart sounds: No murmur.   Pulmonary:      Effort: Pulmonary effort is normal.      Breath sounds: No rales.   Abdominal:      General: Abdomen is flat. There is no distension.      Palpations: Abdomen is soft.      Tenderness: There is no abdominal tenderness.   Musculoskeletal:      Comments: S/p left BKA, distal end of leg in dressing  RLE/Foot dry skin, shin in wound dressing    Skin:     General: Skin is dry.   Neurological:      Mental Status: He is alert and oriented to person, place, and time.     Consults:   Consults (From admission, onward)        Status Ordering Provider     Inpatient consult to Infectious Diseases  Once     Provider:  (Not yet assigned)    Completed PADMA ALCANTAR     Inpatient consult to Podiatry  Once     Provider:  (Not yet assigned)    Completed PADMA ALCANTAR     Inpatient consult to Registered Dietitian/Nutritionist  Once     Provider:  (Not yet assigned)    PADMA Mandujano     Inpatient consult to Social Work  Once     Provider:  (Not yet assigned)    PADMA Mandujano     Inpatient consult to Vascular Surgery  Once     Provider:  (Not yet assigned)    PADMA Mandujano          Final Active Diagnoses:    Diagnosis Date Noted POA    PRINCIPAL PROBLEM:  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 [M86.472] 06/20/2014 Yes    Peripheral arterial disease [I73.9] 12/10/2020  Yes     Chronic    Wound, open, foot with complication [S91.309A] 02/19/2014 Yes    Type 2 diabetes, uncontrolled, with neuropathy [E11.40, E11.65] 09/23/2014 Yes     Chronic    Diabetic neuropathy [E11.40] 08/05/2014 Yes     Chronic    Edema of leg [R60.0] 07/31/2012 Yes     Chronic    Mobitz (type) I (Wenckebach's) atrioventricular block [I44.1] 12/06/2019 Yes    Leg wound, left [S81.802A]  Yes    Essential hypertension [I10] 08/05/2014 Yes     Chronic      Problems Resolved During this Admission:      Discharged Condition: stable    Disposition: Home-Health Care [facility]    Follow Up:  Follow-up Information     Serge Holland MD.    Specialty: Internal Medicine  Why: Hospital follow up @12/16/20 @0850  Contact information:  [address]  [place] [address]  [phone]             [id number]    Specialty: Podiatry  Why: Hospital follow up in one week, office will call with appointment  Contact information:  [address]  [facility] [address] [phone]  Additional information:  [facility] - Main Building, 5th Floor   Please park in [address] and use Atrium elevator           [facility]  [place] In 1 day.    Specialty: Home Health Services  Contact information:  [address]  [address]  [place] [address]  [phone]             [facility] - Vascular Surg [facility].    Specialty: Vascular Surgery  Why: Hospital follow up office will call patient with appointment  Contact information:  [address]  [facility] [address] [phone]  Additional information:  Main Building, 5th Floor   Please park in [address] and use Clinic elevator               Patient Instructions:      Ambulatory referral/consult to Podiatry   Standing Status: Future   Referral Priority: Routine Referral Type: Consultation   Referral Reason: Specialty Services Required   Requested Specialty: Podiatry   Number of Visits  Requested: 1     Ambulatory referral/consult to Infectious Disease   Standing Status: Future   Referral Priority: Routine Referral Type: Consultation   Referral Reason: Specialty Services Required   Requested Specialty: Infectious Diseases   Number of Visits Requested: 1     Ambulatory referral/consult to Vascular Surgery   Standing Status: Future   Referral Priority: Routine Referral Type: Consultation   Referral Reason: Specialty Services Required   Requested Specialty: Vascular Surgery   Number of Visits Requested: 1     Diet diabetic     Notify your health care provider if you experience any of the following:  temperature >100.4     Notify your health care provider if you experience any of the following:  persistent nausea and vomiting or diarrhea     Notify your health care provider if you experience any of the following:  severe uncontrolled pain     Notify your health care provider if you experience any of the following:  difficulty breathing or increased cough     Notify your health care provider if you experience any of the following:  severe persistent headache     Notify your health care provider if you experience any of the following:  persistent dizziness, light-headedness, or visual disturbances     Notify your health care provider if you experience any of the following:  increased confusion or weakness     Activity as tolerated     Medications:  Reconciled Home Medications:      Medication List      START taking these medications    acetaminophen 325 MG tablet  Commonly known as: TYLENOL  Take 2 tablets (650 mg total) by mouth every 6 (six) hours as needed for Pain.     gabapentin 100 MG capsule  Commonly known as: NEURONTIN  Take 2 capsules (200 mg total) by mouth 2 (two) times daily.     HYDROcodone-acetaminophen 5-325 mg per tablet  Commonly known as: NORCO  Take 1 tablet by mouth every 8 (eight) hours as needed (Severe Foot/Leg pain).     isosorbide-hydrALAZINE 20-37.5 mg 20-37.5 mg Tab  Commonly  "known as: BIDIL  Take 1 tablet by mouth 3 (three) times daily.     multivitamin Tab  Take 1 tablet by mouth once daily.     zinc sulfate 220 (50) mg capsule  Commonly known as: ZINCATE  Take 1 capsule (220 mg total) by mouth once daily.        CHANGE how you take these medications    ciprofloxacin HCl 750 MG tablet  Commonly known as: CIPRO  Take 1 tablet (750 mg total) by mouth every 12 (twelve) hours. Please take 2 hours before or after mineral supplements, oral multivitamins, dairy products, or calcium-fortified juices due to food-drug interactions.  What changed:   · medication strength  · how much to take  · when to take this  · additional instructions     losartan 50 MG tablet  Commonly known as: COZAAR  Take 1 tablet (50 mg total) by mouth 2 (two) times a day.  What changed: when to take this     metFORMIN 500 MG ER 24hr tablet  Commonly known as: GLUCOPHAGE-XR  Take 500 mg by mouth once daily.  What changed: Another medication with the same name was removed. Continue taking this medication, and follow the directions you see here.        CONTINUE taking these medications    aspirin 81 MG EC tablet  Commonly known as: ECOTRIN  Take 81 mg by mouth once daily.     atorvastatin 40 MG tablet  Commonly known as: LIPITOR  Take 1 tablet (40 mg total) by mouth every evening.     BD ULTRA-FINE ADITYA PEN NEEDLE 32 gauge x 5/32" Ndle  Generic drug: pen needle, diabetic  USE FOUR TIMES DAILY WITH MEALS AND NIGHTLY     blood sugar diagnostic Strp  Commonly known as: CONTOUR TEST STRIPS  Inject 1 strip into the skin 2 (two) times daily before meals.     glipiZIDE 10 MG tablet  Commonly known as: GLUCOTROL  Take 10 mg by mouth daily with breakfast. HOLD THIS MEDICATION IF YOU ARE SKIPPING A MEAL     GOLD BOND [facility]' TOP  Apply 1 application topically every evening. Gold mahoney diabetic lotion     LANTUS SOLOSTAR U-100 INSULIN glargine 100 units/mL (3mL) SubQ pen  Generic drug: insulin  Inject 68 Units into the skin " every evening.     MICROLET LANCET Misc  Generic drug: lancets        STOP taking these medications    doxycycline 100 MG capsule  Commonly known as: MONODOX            Significant Diagnostic Studies:    Results for KAYLYN KINSEY (MRN [id number]) as of 12/21/2020 15:08   Ref. Range 12/10/2020 05:13 12/11/2020 04:24 12/12/2020 06:16 12/14/2020 03:58   WBC Latest Ref Range: 3.90 - 12.70 K/uL 6.40 5.72 6.19 6.15   RBC Latest Ref Range: 4.60 - 6.20 M/uL 4.02 (L) 3.56 (L) 3.65 (L) 3.54 (L)   Hemoglobin Latest Ref Range: 14.0 - 18.0 g/dL 10.4 (L) 9.0 (L) 9.2 (L) 9.0 (L)   Hematocrit Latest Ref Range: 40.0 - 54.0 % 33.3 (L) 29.5 (L) 31.1 (L) 30.5 (L)   MCV Latest Ref Range: 82 - 98 fL 83 83 85 86   MCH Latest Ref Range: 27.0 - 31.0 pg 25.9 (L) 25.3 (L) 25.2 (L) 25.4 (L)   MCHC Latest Ref Range: 32.0 - 36.0 g/dL 31.2 (L) 30.5 (L) 29.6 (L) 29.5 (L)   RDW Latest Ref Range: 11.5 - 14.5 % 16.6 (H) 16.9 (H) 17.1 (H) 17.0 (H)   Platelets Latest Ref Range: 150 - 350 K/uL 257 321 332 340   MPV Latest Ref Range: 9.2 - 12.9 fL 10.7 10.1 9.9 9.7   Gran % Latest Ref Range: 38.0 - 73.0 % 66.6 63.8 64.2 57.5   Lymph % Latest Ref Range: 18.0 - 48.0 % 15.2 (L) 17.3 (L) 15.8 (L) 20.8   Mono % Latest Ref Range: 4.0 - 15.0 % 11.4 11.9 11.0 10.9   Eosinophil % Latest Ref Range: 0.0 - 8.0 % 5.8 6.3 8.2 (H) 9.3 (H)   Basophil % Latest Ref Range: 0.0 - 1.9 % 0.5 0.5 0.5 0.8          Procedure Component Value Units Date/Time   Culture, Anaerobe [[id number]] (Abnormal) Collected: 12/09/20 1154   Order Status: Completed Specimen: Wound from Leg, Left Updated: 12/15/20 0843    Anaerobic Culture PRESUMPTIVE PREVOTELLA/PORPHYROMONAS GROUP   Few   Abnormal    Aerobic culture [[id number]] (Abnormal)  Collected: 12/09/20 1154   Order Status: Completed Specimen: Wound from Leg, Left Updated: 12/11/20 1314    Aerobic Bacterial Culture PROTEUS MIRABILIS   Moderate   Abnormal    Susceptibility     Proteus mirabilis     CULTURE, AEROBIC  (SPECIFY SOURCE)      Amox/K Clav'ate <=8/4 mcg/mL Sensitive     Amp/Sulbactam <=8/4 mcg/mL Sensitive     Ampicillin >16 mcg/mL Resistant     Cefazolin <=2 mcg/mL Sensitive     Cefepime <=2 mcg/mL Sensitive     Ceftriaxone <=1 mcg/mL Sensitive     Ciprofloxacin <=1 mcg/mL Sensitive     Ertapenem <=0.5 mcg/mL Sensitive     Gentamicin <=4 mcg/mL Sensitive     Levofloxacin <=2 mcg/mL Sensitive     Meropenem <=1 mcg/mL Sensitive     Piperacillin/Tazo <=16 mcg/mL Sensitive     Tobramycin <=4 mcg/mL Sensitive     Trimeth/Sulfa <=2/38 mcg/mL Sensitive            Linear View         Gram stain [[id number]] Collected: 12/09/20 1154   Order Status: Completed Specimen: Wound from Leg, Left Updated: 12/09/20 1717    Gram Stain Result Rare WBC's     Rare Gram positive cocci     Few Gram negative rods   Culture, Anaerobe [[id number]] Collected: 12/09/20 1153   Order Status: Completed Specimen: Bone from Foot, Left Updated: 12/15/20 0843    Anaerobic Culture No anaerobes isolated   Aerobic culture [[id number]] (Abnormal)  Collected: 12/09/20 1153   Order Status: Completed Specimen: Bone from Foot, Left Updated: 12/14/20 1349    Aerobic Bacterial Culture PROTEUS MIRABILIS   Many   Abnormal    Susceptibility     Proteus mirabilis     CULTURE, AEROBIC  (SPECIFY SOURCE)     Amox/K Clav'ate <=8/4 mcg/mL Sensitive     Amp/Sulbactam <=8/4 mcg/mL Sensitive     Ampicillin >16 mcg/mL Resistant     Cefazolin 4 mcg/mL Sensitive     Cefepime <=2 mcg/mL Sensitive     Ceftriaxone <=1 mcg/mL Sensitive     Ciprofloxacin <=1 mcg/mL Sensitive     Ertapenem <=0.5 mcg/mL Sensitive     Gentamicin <=4 mcg/mL Sensitive     Levofloxacin <=2 mcg/mL Sensitive     Meropenem <=1 mcg/mL Sensitive     Piperacillin/Tazo <=16 mcg/mL Sensitive     Tobramycin <=4 mcg/mL Sensitive     Trimeth/Sulfa <=2/38 mcg/mL Sensitive            Linear View         Gram stain [[id number]] Collected: 12/09/20 1153   Order Status: Completed Specimen: Bone from Foot, Left Updated: 12/09/20 2209    Gram  Stain Result No WBC's     Rare Gram negative rods   AFB Culture & Smear [[id number]] Collected: 12/09/20 1153   Order Status: Completed Specimen: Bone from Foot, Left Updated: 12/10/20 2127    AFB Culture & Smear Culture in progress    AFB CULTURE STAIN No acid fast bacilli seen.   Fungus culture [[id number]] Collected: 12/09/20 1153   Order Status: Completed Specimen: Bone from Foot, Left Updated: 12/10/20 1118    Fungus (Mycology) Culture Culture in progress   Urine culture [[id number]] Collected: 12/08/20 1533   Order Status: Completed Specimen: Urine Updated: 12/09/20 1944    Urine Culture, Routine No growth   Narrative:     Specimen Source->Urine   Blood culture x two cultures. Draw prior to antibiotics. [[id number]] Collected: 12/08/20 1326   Order Status: Completed Specimen: Blood from Peripheral, Hand, Left Updated: 12/13/20 1612    Blood Culture, Routine No growth after 5 days.   Narrative:     Aerobic and anaerobic   Blood culture x two cultures. Draw prior to antibiotics. [[id number]] Collected: 12/08/20 1326   Order Status: Completed Specimen: Blood from Peripheral, Hand, Right Updated: 12/13/20 1612    Blood Culture, Routine No growth after 5 days.   Narrative:     Aerobic and anaerobic     MRI TIBIA FIBULA WITHOUT CONTRAST LEFT     CLINICAL HISTORY:  Lower leg pain, osteomyelitis suspected, initial exam;  Pain in left lower leg     TECHNIQUE:  Multiplanar, multisequence imaging was obtained of the left lower extremity (lower leg) without the use of intravenous contrast.     COMPARISON:  Radiograph tibia-fibula two view left 12/08/2020.     FINDINGS:  Exam severely limited secondary to patient motion artifact.     Osseous structures are intact.  No acute fracture.  There is edema like signal involving the remaining osseous structures of the distal forefoot, versus artifact.  There does not appear to be convincing low T1 signal in the structures to convincingly reflect change of osteomyelitis.  There is  slight increased STIR signal in the distal aspect of the calcaneus, this may reflect small focus of marrow edema however no convincing low T1 signal.     Left ankle joint alignment is within normal limits.  No joint effusions noted.     Extensive intramuscular edema throughout the musculature of the left lower extremity.     Diffuse skin thickening and soft tissue edema throughout the left lower extremity.     Visualized vascular structures are unremarkable.     Impression:     In this exam severely limited secondary to patient motion artifact, there is diffuse skin thickening and soft tissue edema throughout the left lower extremity, concerning for cellulitis.  Marrow signal of the tibia and fibula are normal without evidence of infiltrative process or edema to suggest osteomyelitis.     Surgical changes of foot amputation.  Increased STIR signal within the osseous structures of the remaining forefoot is suspected to reflect that of artifact given motion versus surgical change.  No associated low T1 signal to suggest definitively changes of osteomyelitis.  If there is continued concern, consider MRI of the foot for further evaluation.     Extensive intramuscular edema throughout the musculature of the left lower extremity.     Electronically signed by resident: Blayne Flores  Date:                                            12/08/2020  Time:                                           19:52     Electronically signed by: Milton Dove MD  Date:                                            12/08/2020  Time:                                           20:40    US LOWER EXTREMITY ARTERIES BILATERAL     CLINICAL HISTORY:  PVD;  Type 2 diabetes mellitus with diabetic neuropathy, unspecified     TECHNIQUE:  Bilateral lower extremity arterial duplex ultrasound examination performed. Multiple gray scale and color doppler images were obtained in addition to waveform analysis.  Ankle-brachial indices were  calculated.     COMPARISON:  Ultrasound bilateral lower extremity arteries 12/04/2019.     FINDINGS:  The peak systolic velocities on the right are as follows, in centimeters/second:     Common femoral artery: 190     Deep femoral artery: 78     Superficial femoral artery, proximal: 118     Superficial femoral artery, mid portion: 132     Superficial femoral artery, distal: 112     Popliteal artery, proximal: 67     Popliteal artery, distal: 69     Posterior tibial artery: 32     Anterior tibial artery: 150     The peak systolic velocities on the left are as follows, in centimeters/second:     Common femoral artery: 120     Deep femoral artery: 89     Superficial femoral artery, proximal: 119     Superficial femoral artery, mid portion: 176     Superficial femoral artery, distal: 97     Popliteal artery, proximal: 130     Popliteal artery, distal: 125     Posterior tibial artery: 90     Anterior tibial artery: 123     Left arterial waveforms are monophasic beginning in the left superficial femoral artery, but without visualized stenosis or occlusion.  This is similar to exam from 12/04/2019.     Right posterior tibial artery demonstrates monophasic waveforms.     Impression:     Doubling of velocity in the right anterior tibial artery and diminished velocity within the right posterior tibial artery accompanied by abnormal waveforms suggesting significant stenoses.  Abnormal waveforms throughout the left lower extremity without significant velocity change may indicate more proximal stenosis, similar to prior study.     Electronically signed by resident: Luly Narvaez  Date:                                            12/09/2020  Time:                                           09:30     Electronically signed by: Sukumar Krause MD  Date:                                            12/09/2020    CTA RUNOFF ABD PEL BILAT LOWER EXT     CLINICAL HISTORY:  Ankle swelling/redness, cellulitis suspected;Peripheral arterial  disease (PAD), prior revasc, symptomatic;     TECHNIQUE:  CTA of the abdomen, pelvis and bilateral lower extremities was performed following the intravenous administration of 125 cc Omnipaque 350.  Axial sagittal and coronal reformatted images submitted.     COMPARISON:  CT abdomen pelvis without contrast February 2012     FINDINGS:  Evaluation of the arterial system demonstrates celiac and SMA to be patent.  Likewise right and left renal arteries are patent.  Visualized GEORGIE is patent.     On the right the right common iliac internal and external iliac arteries are patent.  Right common femoral as well as the superficial and profundus femoral arteries are patent.  There is suboptimal contrast opacification.  There is some calcified plaque in the SFA with moderate narrowing in the proximal popliteal artery at least 50% diameter narrowing.  Scattered calcified plaque in the more distal popliteal though no high-grade stenosis.  Again moderate narrowing in the distal popliteal just proximal to the trifurcation.  There is significant plaque at the origin of the right anterior tibial artery perhaps focal occlusion.  Significant calcified plaque in the tibioperoneal trunk.  There is significant calcified plaque in the peroneal artery.  Anterior tibial artery appears to be the dominant supply to the right foot.     On the left, common iliac as well as internal and external iliac arteries are patent.  No significant stenoses.  Left common femoral as well as the superficial and profundus femoral arteries are patent.  There is scattered plaque in the left SFA approximately 50% narrowing distally.  There is moderate narrowing in the adductor canal extending into the popliteal artery.  Moderate calcified plaque in the popliteal with 50-70% diameter narrowing at the level of femoral condyle.  Plaque extends caudally.  Again there is significant plaque in the distal popliteal at the trifurcation.  There is calcified plaque in all  3 calf vessels.  Again the anterior tibial appears to be the dominant supply with significant calcified plaque in the posterior tibial and peroneal arteries.  More distally all 3 vessels appear patent.  There is calcified plaque in the dorsalis pedis and posterior tibial.  Amputation left foot at the distal tarsal level.  Soft tissue defect anteriorly noted.  There is significant edema bilaterally.     In the chest no significant pleural or pericardial fluid.  Significant respiratory motion artifact.  No confluent consolidation.     In the abdomen liver is normal in size.  No focal masses is visualized on this CTA.  Some high-density about the periphery of the gallbladder wall may be early enhancement.  Pancreas is normal in size and contour.  Spleen not enlarged.  No adrenal masses.  Hypodensity left kidney consistent with a cyst.  3 mm left lower pole renal calculus.     No significant para-aortic adenopathy.  IVC filter noted.  Nonenlarged pelvic nodes bilaterally.  Likewise bilateral inguinal nodes.  6.5 cm hypodensity in the right hemiscrotum.  Nonemergent ultrasound suggested.  There appears to be a soft tissue component inferiorly.  Prostate mildly enlarged.  Calcification abuts the anterior aspect of the bladder though was present on the prior study.     Evaluation of the bowel demonstrates feces in the colon.  Eventration of the anterior abdominal wall.  No focal bowel dilatation.  No convincing mesenteric adenopathy.     There is some atrophic changes in the bilateral lower extremity musculature.  Soft tissue stranding and edematous changes particularly in the calves though does extend into the thighs.  Skin defect abutting the left forefoot amputation.     Evaluation of the bones demonstrate hypertrophic degenerative changes.  Fusion of the SI joints.  There is some cortical thickening and periosteal new bone about the distal right fibula likely posttraumatic.  Postop change about the left foot with some  osseous irregularity at the resection site.  Bones are demineralized.  There is fusion of the left tibia and fibula superiorly.     Impression:     Suboptimal opacification of the arterial system.  There is moderate plaque in the SFA and popliteal arteries bilaterally.  Significant three-vessel calf disease though the dominant supply appears to be the anterior tibial artery bilaterally.  Details as above.     Postop amputation of the left forefoot likely at the distal tarsal bones.  There appears to be some irregularity of the residual navicula and cuboid within adjacent soft tissue defect.  Osteomyelitis not excluded.  MRI left foot may be helpful.     Bilateral lower extremity edema/cellulitis.     Probable posttraumatic change right fibula.     6.5 cm hypodensity right hemiscrotum.  This may be a hydrocele.  Ultrasound may be helpful.     Additional findings as above.     This report was flagged in Epic as abnormal.        Electronically signed by: Harris Espino MD  Date:                                            12/11/2020    Pending Diagnostic Studies:     None        Indwelling Lines/Drains at time of discharge:   Lines/Drains/Airways     None                 Time spent on the discharge of patient: 45 minutes  Patient was seen and examined on the date of discharge and determined to be suitable for discharge.         Kun Shook MD  Department of Hospital Medicine  [facility]

## 2020-12-21 NOTE — PROGRESS NOTES
Subjective:       Patient ID: Saji Castañeda is a 70 y.o. male.    Chief Complaint: Wound Check    HPI     Patient is a 70 y.o. male with history of diabetses, HTN, PAD,osteomyelitis of left foot requiring amputation in 2010.  He presetned to the ED recently for worsen wounds of BLE.  He was  hospitalized and discharged with home health on 12/16/20 for treatment of chronic osteomyelitis of LLE and ulcers to BLE that have been presents for a few months.  He was followed by Podiatry and ID in the hop\[facility]", currently on antibiotics.  Had exposed bone that was biospy with no surgical interventions needed per podiatry.  Denies fever, chills, pain, erythema, warmth, drainage.     Past Medical History:   Diagnosis Date    Arthritis     legs    Diabetes mellitus     Diabetes mellitus, type 2     Hyperlipidemia     Osteomyelitis      Past Surgical History:   Procedure Laterality Date    FOOT AMPUTATION  October 2010    left high midfoot amputation       Review of Systems   Constitutional: Negative for diaphoresis and fever.   Respiratory: Negative for shortness of breath.    Cardiovascular: Negative for chest pain and leg swelling.   Musculoskeletal: Positive for gait problem.   Skin: Positive for wound.   All other systems reviewed and are negative.            Objective:      Physical Exam  Vitals signs reviewed.   Constitutional:       General: He is not in acute distress.     Appearance: Normal appearance. He is well-developed.   Cardiovascular:      Rate and Rhythm: Normal rate.   Pulmonary:      Effort: Pulmonary effort is normal. No respiratory distress.   Skin:     General: Skin is warm and dry.      Capillary Refill: Capillary refill takes less than 2 seconds.   Neurological:      Mental Status: He is alert and oriented to person, place, and time.   Psychiatric:         Behavior: Behavior normal.         Assessment:       1. Open wound of foot with complication, unspecified laterality, initial encounter       Ulcers to BLE  Chronic osteomyelitis  PAD         Wound 12/12/20 0730 Left anterior Foot #1 (Active)   12/12/20 0730    Pre-existing:    Primary Wound Type:    Side: Left   Orientation: anterior   Location: Foot   Wound Number (optional): #1   Ankle-Brachial Index:    Pulses:    Removal Indication and Assessment:    Wound Outcome: Amputation   (Retired) Wound Type:    (Retired) Wound Length (cm):    (Retired) Wound Width (cm):    (Retired) Depth (cm):    Wound Description (Comments):    Removal Indications:    Wound Image   12/21/20 1112   Dressing Appearance Intact;Moist drainage 12/21/20 1112   Drainage Amount Moderate 12/21/20 1112   Drainage Characteristics/Odor Serosanguineous 12/21/20 1112   Appearance Red;Slough 12/21/20 1112   Tissue loss description Full thickness 12/21/20 1112   Red (%), Wound Tissue Color 50 % 12/21/20 1112   Yellow (%), Wound Tissue Color 50 % 12/21/20 1112   Periwound Area Edematous;Dry 12/21/20 1112   Wound Edges Undefined 12/21/20 1112   Wound Length (cm) 1.5 cm 12/21/20 1112   Wound Width (cm) 2 cm 12/21/20 1112   Wound Depth (cm) 0.5 cm 12/21/20 1112   Wound Volume (cm^3) 1.5 cm^3 12/21/20 1112   Wound Surface Area (cm^2) 3 cm^2 12/21/20 1112   Care Cleansed with:;Wound cleanser 12/21/20 1112   Dressing Applied;Honey;Other (see comments);Rolled gauze;Cast padding;Elastic bandage 12/21/20 1112            Wound 12/21/20 1114 Ulceration Left posterior Leg (Active)   12/21/20 1114    Pre-existing: Yes   Primary Wound Type: Ulceration   Side: Left   Orientation: posterior   Location: Leg   Wound Number (optional):    Ankle-Brachial Index:    Pulses:    Removal Indication and Assessment:    Wound Outcome:    (Retired) Wound Type:    (Retired) Wound Length (cm):    (Retired) Wound Width (cm):    (Retired) Depth (cm):    Wound Description (Comments):    Removal Indications:    Wound Image   12/21/20 1113   Dressing Appearance Intact;Dried drainage 12/21/20 1113   Drainage Amount  Moderate 12/21/20 1113   Drainage Characteristics/Odor Serosanguineous 12/21/20 1113   Appearance Pink;Slough 12/21/20 1113   Tissue loss description Full thickness 12/21/20 1113   Red (%), Wound Tissue Color 50 % 12/21/20 1113   Yellow (%), Wound Tissue Color 50 % 12/21/20 1113   Periwound Area Dry;Edematous 12/21/20 1113   Wound Edges Undefined 12/21/20 1113   Wound Length (cm) 2 cm 12/21/20 1113   Wound Width (cm) 3 cm 12/21/20 1113   Wound Depth (cm) 0.5 cm 12/21/20 1113   Wound Volume (cm^3) 3 cm^3 12/21/20 1113   Wound Surface Area (cm^2) 6 cm^2 12/21/20 1113   Care Cleansed with:;Wound cleanser 12/21/20 1113   Dressing Applied;Rolled gauze;Elastic bandage;Honey 12/21/20 1113            Wound 12/21/20 1115 Ulceration Left lateral Leg (Active)   12/21/20 1115    Pre-existing: Yes   Primary Wound Type: Ulceration   Side: Left   Orientation: lateral   Location: Leg   Wound Number (optional):    Ankle-Brachial Index:    Pulses:    Removal Indication and Assessment:    Wound Outcome:    (Retired) Wound Type:    (Retired) Wound Length (cm):    (Retired) Wound Width (cm):    (Retired) Depth (cm):    Wound Description (Comments):    Removal Indications:    Wound Image   12/21/20 1113   Dressing Appearance Intact;Dried drainage 12/21/20 1113   Drainage Amount Moderate 12/21/20 1113   Drainage Characteristics/Odor Serosanguineous 12/21/20 1113   Appearance Red;Slough 12/21/20 1113   Tissue loss description Full thickness 12/21/20 1113   Red (%), Wound Tissue Color 50 % 12/21/20 1113   Yellow (%), Wound Tissue Color 50 % 12/21/20 1113   Periwound Area Edematous;Intact 12/21/20 1113   Wound Edges Undefined 12/21/20 1113   Wound Length (cm) 3 cm 12/21/20 1113   Wound Width (cm) 7 cm 12/21/20 1113   Wound Depth (cm) 0.5 cm 12/21/20 1113   Wound Volume (cm^3) 10.5 cm^3 12/21/20 1113   Wound Surface Area (cm^2) 21 cm^2 12/21/20 1113            Wound 12/21/20 1117 Ulceration Left posterior Heel (Active)   12/21/20 1117     Pre-existing: Yes   Primary Wound Type: Ulceration   Side: Left   Orientation: posterior   Location: Heel   Wound Number (optional):    Ankle-Brachial Index:    Pulses:    Removal Indication and Assessment:    Wound Outcome:    (Retired) Wound Type:    (Retired) Wound Length (cm):    (Retired) Wound Width (cm):    (Retired) Depth (cm):    Wound Description (Comments):    Removal Indications:    Wound Image   12/21/20 1113   Dressing Appearance Intact;Dry 12/21/20 1113   Drainage Amount Moderate 12/21/20 1113   Drainage Characteristics/Odor Serosanguineous 12/21/20 1113   Appearance Red;Slough 12/21/20 1113   Tissue loss description Full thickness 12/21/20 1113   Red (%), Wound Tissue Color 50 % 12/21/20 1113   Yellow (%), Wound Tissue Color 50 % 12/21/20 1113   Periwound Area Intact 12/21/20 1113   Wound Edges Undefined 12/21/20 1113   Wound Length (cm) 1 cm 12/21/20 1113   Wound Width (cm) 2 cm 12/21/20 1113   Wound Depth (cm) 0.3 cm 12/21/20 1113   Wound Volume (cm^3) 0.6 cm^3 12/21/20 1113   Wound Surface Area (cm^2) 2 cm^2 12/21/20 1113   Care Cleansed with:;Wound cleanser 12/21/20 1113   Dressing Applied;Honey 12/21/20 1113            Wound 12/21/20 1120 Ulceration Right posterior Leg (Active)   12/21/20 1120    Pre-existing: Yes   Primary Wound Type: Ulceration   Side: Right   Orientation: posterior   Location: Leg   Wound Number (optional):    Ankle-Brachial Index:    Pulses:    Removal Indication and Assessment:    Wound Outcome:    (Retired) Wound Type:    (Retired) Wound Length (cm):    (Retired) Wound Width (cm):    (Retired) Depth (cm):    Wound Description (Comments):    Removal Indications:    Wound Image   12/21/20 1113   Dressing Appearance Dry;Intact 12/21/20 1113   Drainage Amount Small 12/21/20 1113   Appearance Red;Slough 12/21/20 1113   Tissue loss description Full thickness 12/21/20 1113   Red (%), Wound Tissue Color 50 % 12/21/20 1113   Yellow (%), Wound Tissue Color 50 % 12/21/20 1113    Periwound Area Intact 12/21/20 1113   Wound Edges Undefined 12/21/20 1113   Wound Length (cm) 2 cm 12/21/20 1113   Wound Width (cm) 1.7 cm 12/21/20 1113   Wound Depth (cm) 0.3 cm 12/21/20 1113   Wound Volume (cm^3) 1.02 cm^3 12/21/20 1113   Wound Surface Area (cm^2) 3.4 cm^2 12/21/20 1113   Dressing Applied;Honey;Gauze;Tubular bandage 12/21/20 1113           Plan:         Wounds dressed with medihoney and hydrofera blue ready and kerlix and coban  Verbal orders given  RTC 1 month      [facility]    Clean wounds with wound cleanser  Left anterior and posterior and lateral leg: apply medihoney and hydrofera classic moistened with saline and cover with border foam dressing  Wrap from the foot to the knee with kerlix and secure with coban for moderate compression.   Apply flexinet over the ACE.    Right leg ulcers:  Apply medihoney and hydrafera classic moistened with saline, cover with border foam dressing  Wrap from the toes to the knee with cast padding and coban for moderate compression.   Apply flexinet over the ACE     Skilled nursing twice weekly

## 2020-12-23 ENCOUNTER — TELEPHONE (OUTPATIENT)
Dept: INFECTIOUS DISEASES | Facility: CLINIC | Age: 70
End: 2020-12-23

## 2020-12-31 ENCOUNTER — TELEPHONE (OUTPATIENT)
Dept: INFECTIOUS DISEASES | Facility: CLINIC | Age: 70
End: 2020-12-31

## 2021-01-12 LAB — FUNGUS SPEC CULT: NORMAL

## 2021-01-28 ENCOUNTER — TELEPHONE (OUTPATIENT)
Dept: CARDIOLOGY | Facility: CLINIC | Age: 71
End: 2021-01-28

## 2021-01-29 ENCOUNTER — OFFICE VISIT (OUTPATIENT)
Dept: VASCULAR SURGERY | Facility: CLINIC | Age: 71
End: 2021-01-29
Attending: SURGERY
Payer: MEDICARE

## 2021-01-29 ENCOUNTER — LAB VISIT (OUTPATIENT)
Dept: INTERNAL MEDICINE | Facility: CLINIC | Age: 71
End: 2021-01-29
Attending: SURGERY
Payer: MEDICARE

## 2021-01-29 ENCOUNTER — OFFICE VISIT (OUTPATIENT)
Dept: INFECTIOUS DISEASES | Facility: CLINIC | Age: 71
End: 2021-01-29
Payer: MEDICARE

## 2021-01-29 ENCOUNTER — OFFICE VISIT (OUTPATIENT)
Dept: WOUND CARE | Facility: CLINIC | Age: 71
End: 2021-01-29
Payer: MEDICARE

## 2021-01-29 VITALS
BODY MASS INDEX: 44.15 KG/M2 | SYSTOLIC BLOOD PRESSURE: 164 MMHG | TEMPERATURE: 98 F | BODY MASS INDEX: 44.28 KG/M2 | HEIGHT: 69 IN | HEIGHT: 69 IN | SYSTOLIC BLOOD PRESSURE: 172 MMHG | DIASTOLIC BLOOD PRESSURE: 68 MMHG | WEIGHT: 298.94 LBS | DIASTOLIC BLOOD PRESSURE: 67 MMHG | HEART RATE: 52 BPM | HEART RATE: 44 BPM | TEMPERATURE: 98 F | RESPIRATION RATE: 20 BRPM

## 2021-01-29 VITALS
HEART RATE: 62 BPM | BODY MASS INDEX: 44.15 KG/M2 | TEMPERATURE: 98 F | HEIGHT: 69 IN | SYSTOLIC BLOOD PRESSURE: 183 MMHG | DIASTOLIC BLOOD PRESSURE: 76 MMHG

## 2021-01-29 DIAGNOSIS — Z01.818 PREOP EXAMINATION: ICD-10-CM

## 2021-01-29 DIAGNOSIS — I73.9 PERIPHERAL ARTERIAL DISEASE: Chronic | ICD-10-CM

## 2021-01-29 DIAGNOSIS — S91.302A OPEN WOUND OF LEFT FOOT WITH COMPLICATION, INITIAL ENCOUNTER: ICD-10-CM

## 2021-01-29 DIAGNOSIS — L97.222 SKIN ULCER OF LEFT CALF WITH FAT LAYER EXPOSED: ICD-10-CM

## 2021-01-29 DIAGNOSIS — L98.499 ISCHEMIC ULCER, UNSPECIFIED ULCER STAGE: Primary | ICD-10-CM

## 2021-01-29 DIAGNOSIS — B35.3 TINEA PEDIS OF BOTH FEET: ICD-10-CM

## 2021-01-29 DIAGNOSIS — E11.621 ISCHEMIC ULCER DIABETIC FOOT: Primary | ICD-10-CM

## 2021-01-29 DIAGNOSIS — L97.509 ISCHEMIC ULCER DIABETIC FOOT: Primary | ICD-10-CM

## 2021-01-29 DIAGNOSIS — R60.0 EDEMA OF LEG: ICD-10-CM

## 2021-01-29 DIAGNOSIS — L97.522 FOOT ULCER WITH FAT LAYER EXPOSED, LEFT: ICD-10-CM

## 2021-01-29 DIAGNOSIS — I73.9 PERIPHERAL ARTERIAL DISEASE: ICD-10-CM

## 2021-01-29 DIAGNOSIS — I87.2 VENOUS INSUFFICIENCY OF BOTH LOWER EXTREMITIES: ICD-10-CM

## 2021-01-29 DIAGNOSIS — L30.9 DERMATITIS: ICD-10-CM

## 2021-01-29 DIAGNOSIS — S91.302A OPEN WOUND OF LEFT FOOT WITH COMPLICATION, INITIAL ENCOUNTER: Primary | ICD-10-CM

## 2021-01-29 DIAGNOSIS — L97.212 SKIN ULCER OF RIGHT CALF WITH FAT LAYER EXPOSED: ICD-10-CM

## 2021-01-29 PROCEDURE — 3078F DIAST BP <80 MM HG: CPT | Mod: CPTII,S$GLB,, | Performed by: SURGERY

## 2021-01-29 PROCEDURE — 1101F PT FALLS ASSESS-DOCD LE1/YR: CPT | Mod: CPTII,S$GLB,, | Performed by: SURGERY

## 2021-01-29 PROCEDURE — 1126F AMNT PAIN NOTED NONE PRSNT: CPT | Mod: S$GLB,,, | Performed by: SURGERY

## 2021-01-29 PROCEDURE — 99213 OFFICE O/P EST LOW 20 MIN: CPT | Mod: PBBFAC,27 | Performed by: INTERNAL MEDICINE

## 2021-01-29 PROCEDURE — 1159F PR MEDICATION LIST DOCUMENTED IN MEDICAL RECORD: ICD-10-PCS | Mod: S$GLB,,, | Performed by: SURGERY

## 2021-01-29 PROCEDURE — 3078F PR MOST RECENT DIASTOLIC BLOOD PRESSURE < 80 MM HG: ICD-10-PCS | Mod: CPTII,S$GLB,, | Performed by: SURGERY

## 2021-01-29 PROCEDURE — 99213 OFFICE O/P EST LOW 20 MIN: CPT | Mod: S$GLB,,, | Performed by: NURSE PRACTITIONER

## 2021-01-29 PROCEDURE — 99999 PR PBB SHADOW E&M-EST. PATIENT-LVL V: CPT | Mod: PBBFAC,,, | Performed by: NURSE PRACTITIONER

## 2021-01-29 PROCEDURE — 99214 PR OFFICE/OUTPT VISIT, EST, LEVL IV, 30-39 MIN: ICD-10-PCS | Mod: S$GLB,,, | Performed by: SURGERY

## 2021-01-29 PROCEDURE — 3008F BODY MASS INDEX DOCD: CPT | Mod: CPTII,S$GLB,, | Performed by: SURGERY

## 2021-01-29 PROCEDURE — 3077F PR MOST RECENT SYSTOLIC BLOOD PRESSURE >= 140 MM HG: ICD-10-PCS | Mod: CPTII,S$GLB,, | Performed by: SURGERY

## 2021-01-29 PROCEDURE — 99999 PR PBB SHADOW E&M-EST. PATIENT-LVL V: ICD-10-PCS | Mod: PBBFAC,,, | Performed by: NURSE PRACTITIONER

## 2021-01-29 PROCEDURE — 3288F PR FALLS RISK ASSESSMENT DOCUMENTED: ICD-10-PCS | Mod: CPTII,S$GLB,, | Performed by: SURGERY

## 2021-01-29 PROCEDURE — 99214 OFFICE O/P EST MOD 30 MIN: CPT | Mod: S$GLB,,, | Performed by: INTERNAL MEDICINE

## 2021-01-29 PROCEDURE — 99215 OFFICE O/P EST HI 40 MIN: CPT | Mod: PBBFAC | Performed by: NURSE PRACTITIONER

## 2021-01-29 PROCEDURE — 3008F PR BODY MASS INDEX (BMI) DOCUMENTED: ICD-10-PCS | Mod: CPTII,S$GLB,, | Performed by: SURGERY

## 2021-01-29 PROCEDURE — U0003 INFECTIOUS AGENT DETECTION BY NUCLEIC ACID (DNA OR RNA); SEVERE ACUTE RESPIRATORY SYNDROME CORONAVIRUS 2 (SARS-COV-2) (CORONAVIRUS DISEASE [COVID-19]), AMPLIFIED PROBE TECHNIQUE, MAKING USE OF HIGH THROUGHPUT TECHNOLOGIES AS DESCRIBED BY CMS-2020-01-R: HCPCS

## 2021-01-29 PROCEDURE — 3077F SYST BP >= 140 MM HG: CPT | Mod: CPTII,S$GLB,, | Performed by: SURGERY

## 2021-01-29 PROCEDURE — 99999 PR PBB SHADOW E&M-EST. PATIENT-LVL V: CPT | Mod: PBBFAC,,, | Performed by: SURGERY

## 2021-01-29 PROCEDURE — 1126F PR PAIN SEVERITY QUANTIFIED, NO PAIN PRESENT: ICD-10-PCS | Mod: S$GLB,,, | Performed by: SURGERY

## 2021-01-29 PROCEDURE — 99999 PR PBB SHADOW E&M-EST. PATIENT-LVL III: ICD-10-PCS | Mod: PBBFAC,,, | Performed by: INTERNAL MEDICINE

## 2021-01-29 PROCEDURE — 99214 OFFICE O/P EST MOD 30 MIN: CPT | Mod: S$GLB,,, | Performed by: SURGERY

## 2021-01-29 PROCEDURE — 3288F FALL RISK ASSESSMENT DOCD: CPT | Mod: CPTII,S$GLB,, | Performed by: SURGERY

## 2021-01-29 PROCEDURE — 1159F MED LIST DOCD IN RCRD: CPT | Mod: S$GLB,,, | Performed by: SURGERY

## 2021-01-29 PROCEDURE — 99999 PR PBB SHADOW E&M-EST. PATIENT-LVL III: CPT | Mod: PBBFAC,,, | Performed by: INTERNAL MEDICINE

## 2021-01-29 PROCEDURE — 99214 PR OFFICE/OUTPT VISIT, EST, LEVL IV, 30-39 MIN: ICD-10-PCS | Mod: S$GLB,,, | Performed by: INTERNAL MEDICINE

## 2021-01-29 PROCEDURE — 1101F PR PT FALLS ASSESS DOC 0-1 FALLS W/OUT INJ PAST YR: ICD-10-PCS | Mod: CPTII,S$GLB,, | Performed by: SURGERY

## 2021-01-29 PROCEDURE — 99999 PR PBB SHADOW E&M-EST. PATIENT-LVL V: ICD-10-PCS | Mod: PBBFAC,,, | Performed by: SURGERY

## 2021-01-29 PROCEDURE — 99213 PR OFFICE/OUTPT VISIT, EST, LEVL III, 20-29 MIN: ICD-10-PCS | Mod: S$GLB,,, | Performed by: NURSE PRACTITIONER

## 2021-01-29 RX ORDER — KETOCONAZOLE 20 MG/G
CREAM TOPICAL DAILY
Qty: 60 G | Refills: 1 | Status: ON HOLD | OUTPATIENT
Start: 2021-01-29 | End: 2022-06-20

## 2021-01-29 RX ORDER — TRIAMCINOLONE ACETONIDE 1 MG/G
CREAM TOPICAL 2 TIMES DAILY
Qty: 453.6 G | Refills: 0 | Status: ON HOLD | OUTPATIENT
Start: 2021-01-29 | End: 2022-06-20

## 2021-01-30 LAB — SARS-COV-2 RNA RESP QL NAA+PROBE: NOT DETECTED

## 2021-02-01 ENCOUNTER — TELEPHONE (OUTPATIENT)
Dept: VASCULAR SURGERY | Facility: CLINIC | Age: 71
End: 2021-02-01

## 2021-02-02 ENCOUNTER — TELEPHONE (OUTPATIENT)
Dept: VASCULAR SURGERY | Facility: CLINIC | Age: 71
End: 2021-02-02

## 2021-02-02 RX ORDER — PANTOPRAZOLE SODIUM 40 MG/1
40 TABLET, DELAYED RELEASE ORAL DAILY
Status: ON HOLD | COMMUNITY
End: 2022-06-20

## 2021-02-03 ENCOUNTER — ANESTHESIA (OUTPATIENT)
Dept: SURGERY | Facility: HOSPITAL | Age: 71
End: 2021-02-03
Payer: MEDICARE

## 2021-02-03 ENCOUNTER — ANESTHESIA EVENT (OUTPATIENT)
Dept: SURGERY | Facility: HOSPITAL | Age: 71
End: 2021-02-03
Payer: MEDICARE

## 2021-02-03 ENCOUNTER — HOSPITAL ENCOUNTER (OUTPATIENT)
Facility: HOSPITAL | Age: 71
Discharge: HOME | End: 2021-02-03
Attending: SURGERY | Admitting: SURGERY
Payer: MEDICARE

## 2021-02-03 VITALS
HEART RATE: 73 BPM | TEMPERATURE: 98 F | SYSTOLIC BLOOD PRESSURE: 170 MMHG | RESPIRATION RATE: 20 BRPM | OXYGEN SATURATION: 95 % | DIASTOLIC BLOOD PRESSURE: 76 MMHG

## 2021-02-03 DIAGNOSIS — L97.222 SKIN ULCER OF LEFT CALF WITH FAT LAYER EXPOSED: Primary | ICD-10-CM

## 2021-02-03 DIAGNOSIS — I73.9 PVD (PERIPHERAL VASCULAR DISEASE): ICD-10-CM

## 2021-02-03 LAB
POCT GLUCOSE: 138 MG/DL (ref 70–110)
POCT GLUCOSE: 156 MG/DL (ref 70–110)
SARS-COV-2 RDRP RESP QL NAA+PROBE: NEGATIVE

## 2021-02-03 PROCEDURE — 75710 ARTERY X-RAYS ARM/LEG: CPT | Mod: 26,,, | Performed by: SURGERY

## 2021-02-03 PROCEDURE — 36000707: Performed by: SURGERY

## 2021-02-03 PROCEDURE — U0002 COVID-19 LAB TEST NON-CDC: HCPCS

## 2021-02-03 PROCEDURE — D9220A PRA ANESTHESIA: Mod: ANES,,, | Performed by: ANESTHESIOLOGY

## 2021-02-03 PROCEDURE — 63600175 PHARM REV CODE 636 W HCPCS: Performed by: SURGERY

## 2021-02-03 PROCEDURE — 71000015 HC POSTOP RECOV 1ST HR: Performed by: SURGERY

## 2021-02-03 PROCEDURE — 37000009 HC ANESTHESIA EA ADD 15 MINS: Performed by: SURGERY

## 2021-02-03 PROCEDURE — C1887 CATHETER, GUIDING: HCPCS | Performed by: SURGERY

## 2021-02-03 PROCEDURE — 36247 PR PLACE CATH SUBSUBSELECT ART,ABD/PEL: ICD-10-PCS | Mod: LT,,, | Performed by: SURGERY

## 2021-02-03 PROCEDURE — 25000003 PHARM REV CODE 250: Performed by: STUDENT IN AN ORGANIZED HEALTH CARE EDUCATION/TRAINING PROGRAM

## 2021-02-03 PROCEDURE — 82962 GLUCOSE BLOOD TEST: CPT | Performed by: SURGERY

## 2021-02-03 PROCEDURE — 75710 PR  ANGIO EXTREMITY UNILAT: ICD-10-PCS | Mod: 26,,, | Performed by: SURGERY

## 2021-02-03 PROCEDURE — D9220A PRA ANESTHESIA: ICD-10-PCS | Mod: CRNA,,, | Performed by: NURSE ANESTHETIST, CERTIFIED REGISTERED

## 2021-02-03 PROCEDURE — 37000008 HC ANESTHESIA 1ST 15 MINUTES: Performed by: SURGERY

## 2021-02-03 PROCEDURE — C1894 INTRO/SHEATH, NON-LASER: HCPCS | Performed by: SURGERY

## 2021-02-03 PROCEDURE — 63600175 PHARM REV CODE 636 W HCPCS: Performed by: NURSE ANESTHETIST, CERTIFIED REGISTERED

## 2021-02-03 PROCEDURE — C1769 GUIDE WIRE: HCPCS | Performed by: SURGERY

## 2021-02-03 PROCEDURE — 36247 INS CATH ABD/L-EXT ART 3RD: CPT | Mod: LT,,, | Performed by: SURGERY

## 2021-02-03 PROCEDURE — 36000706: Performed by: SURGERY

## 2021-02-03 PROCEDURE — 25500020 PHARM REV CODE 255: Performed by: SURGERY

## 2021-02-03 PROCEDURE — 25000003 PHARM REV CODE 250: Performed by: NURSE ANESTHETIST, CERTIFIED REGISTERED

## 2021-02-03 PROCEDURE — C1760 CLOSURE DEV, VASC: HCPCS | Performed by: SURGERY

## 2021-02-03 PROCEDURE — 71000016 HC POSTOP RECOV ADDL HR: Performed by: SURGERY

## 2021-02-03 PROCEDURE — 25000003 PHARM REV CODE 250: Performed by: SURGERY

## 2021-02-03 PROCEDURE — D9220A PRA ANESTHESIA: Mod: CRNA,,, | Performed by: NURSE ANESTHETIST, CERTIFIED REGISTERED

## 2021-02-03 PROCEDURE — D9220A PRA ANESTHESIA: ICD-10-PCS | Mod: ANES,,, | Performed by: ANESTHESIOLOGY

## 2021-02-03 RX ORDER — MIDAZOLAM HYDROCHLORIDE 1 MG/ML
INJECTION, SOLUTION INTRAMUSCULAR; INTRAVENOUS
Status: DISCONTINUED | OUTPATIENT
Start: 2021-02-03 | End: 2021-02-03

## 2021-02-03 RX ORDER — LORAZEPAM 2 MG/ML
0.25 INJECTION INTRAMUSCULAR ONCE AS NEEDED
Status: DISCONTINUED | OUTPATIENT
Start: 2021-02-03 | End: 2021-02-03 | Stop reason: HOSPADM

## 2021-02-03 RX ORDER — LABETALOL HYDROCHLORIDE 5 MG/ML
INJECTION, SOLUTION INTRAVENOUS
Status: DISCONTINUED | OUTPATIENT
Start: 2021-02-03 | End: 2021-02-03

## 2021-02-03 RX ORDER — SODIUM CHLORIDE 9 MG/ML
INJECTION, SOLUTION INTRAVENOUS CONTINUOUS
Status: DISCONTINUED | OUTPATIENT
Start: 2021-02-03 | End: 2021-02-03 | Stop reason: HOSPADM

## 2021-02-03 RX ORDER — IODIXANOL 320 MG/ML
INJECTION, SOLUTION INTRAVASCULAR
Status: DISCONTINUED | OUTPATIENT
Start: 2021-02-03 | End: 2021-02-03 | Stop reason: HOSPADM

## 2021-02-03 RX ORDER — OXYCODONE AND ACETAMINOPHEN 5; 325 MG/1; MG/1
1 TABLET ORAL EVERY 4 HOURS PRN
Status: DISCONTINUED | OUTPATIENT
Start: 2021-02-03 | End: 2021-02-03 | Stop reason: HOSPADM

## 2021-02-03 RX ORDER — KETAMINE HCL IN 0.9 % NACL 50 MG/5 ML
SYRINGE (ML) INTRAVENOUS
Status: DISCONTINUED | OUTPATIENT
Start: 2021-02-03 | End: 2021-02-03

## 2021-02-03 RX ORDER — PROPOFOL 10 MG/ML
VIAL (ML) INTRAVENOUS CONTINUOUS PRN
Status: DISCONTINUED | OUTPATIENT
Start: 2021-02-03 | End: 2021-02-03

## 2021-02-03 RX ORDER — PROPOFOL 10 MG/ML
VIAL (ML) INTRAVENOUS
Status: DISCONTINUED | OUTPATIENT
Start: 2021-02-03 | End: 2021-02-03

## 2021-02-03 RX ORDER — LIDOCAINE HYDROCHLORIDE 10 MG/ML
INJECTION, SOLUTION EPIDURAL; INFILTRATION; INTRACAUDAL; PERINEURAL
Status: DISCONTINUED | OUTPATIENT
Start: 2021-02-03 | End: 2021-02-03 | Stop reason: HOSPADM

## 2021-02-03 RX ORDER — FENTANYL CITRATE 50 UG/ML
INJECTION, SOLUTION INTRAMUSCULAR; INTRAVENOUS
Status: DISCONTINUED | OUTPATIENT
Start: 2021-02-03 | End: 2021-02-03

## 2021-02-03 RX ORDER — HYDRALAZINE HYDROCHLORIDE 20 MG/ML
INJECTION INTRAMUSCULAR; INTRAVENOUS
Status: DISCONTINUED | OUTPATIENT
Start: 2021-02-03 | End: 2021-02-03

## 2021-02-03 RX ORDER — MEPERIDINE HYDROCHLORIDE 50 MG/ML
12.5 INJECTION INTRAMUSCULAR; INTRAVENOUS; SUBCUTANEOUS ONCE AS NEEDED
Status: DISCONTINUED | OUTPATIENT
Start: 2021-02-03 | End: 2021-02-03 | Stop reason: HOSPADM

## 2021-02-03 RX ORDER — HYDROMORPHONE HYDROCHLORIDE 1 MG/ML
0.2 INJECTION, SOLUTION INTRAMUSCULAR; INTRAVENOUS; SUBCUTANEOUS EVERY 5 MIN PRN
Status: DISCONTINUED | OUTPATIENT
Start: 2021-02-03 | End: 2021-02-03 | Stop reason: HOSPADM

## 2021-02-03 RX ORDER — HEPARIN SODIUM 1000 [USP'U]/ML
INJECTION, SOLUTION INTRAVENOUS; SUBCUTANEOUS
Status: DISCONTINUED | OUTPATIENT
Start: 2021-02-03 | End: 2021-02-03 | Stop reason: HOSPADM

## 2021-02-03 RX ORDER — LABETALOL HYDROCHLORIDE 5 MG/ML
15 INJECTION, SOLUTION INTRAVENOUS EVERY 10 MIN PRN
Status: DISCONTINUED | OUTPATIENT
Start: 2021-02-03 | End: 2021-02-03

## 2021-02-03 RX ADMIN — FENTANYL CITRATE 50 MCG: 50 INJECTION, SOLUTION INTRAMUSCULAR; INTRAVENOUS at 11:02

## 2021-02-03 RX ADMIN — OXYCODONE HYDROCHLORIDE AND ACETAMINOPHEN 1 TABLET: 5; 325 TABLET ORAL at 12:02

## 2021-02-03 RX ADMIN — MIDAZOLAM HYDROCHLORIDE 2 MG: 1 INJECTION, SOLUTION INTRAMUSCULAR; INTRAVENOUS at 10:02

## 2021-02-03 RX ADMIN — FENTANYL CITRATE 25 MCG: 50 INJECTION, SOLUTION INTRAMUSCULAR; INTRAVENOUS at 11:02

## 2021-02-03 RX ADMIN — HYDRALAZINE HYDROCHLORIDE 10 MG: 20 INJECTION INTRAMUSCULAR; INTRAVENOUS at 12:02

## 2021-02-03 RX ADMIN — PROPOFOL 50 MCG/KG/MIN: 10 INJECTION, EMULSION INTRAVENOUS at 11:02

## 2021-02-03 RX ADMIN — Medication 30 MG: at 11:02

## 2021-02-03 RX ADMIN — DEXTROSE 2 G: 50 INJECTION, SOLUTION INTRAVENOUS at 11:02

## 2021-02-03 RX ADMIN — LABETALOL HYDROCHLORIDE 20 MG: 5 INJECTION, SOLUTION INTRAVENOUS at 12:02

## 2021-02-03 RX ADMIN — DEXTROSE 1 G: 50 INJECTION, SOLUTION INTRAVENOUS at 11:02

## 2021-02-03 RX ADMIN — GLYCOPYRROLATE 0.2 MG: 0.2 INJECTION, SOLUTION INTRAMUSCULAR; INTRAVITREAL at 11:02

## 2021-02-03 RX ADMIN — SODIUM CHLORIDE: 0.9 INJECTION, SOLUTION INTRAVENOUS at 10:02

## 2021-02-03 RX ADMIN — PROPOFOL 40 MG: 10 INJECTION, EMULSION INTRAVENOUS at 11:02

## 2021-02-10 LAB
ACID FAST MOD KINY STN SPEC: NORMAL
MYCOBACTERIUM SPEC QL CULT: NORMAL

## 2021-02-15 ENCOUNTER — TELEPHONE (OUTPATIENT)
Dept: WOUND CARE | Facility: CLINIC | Age: 71
End: 2021-02-15

## 2021-02-17 ENCOUNTER — HOSPITAL ENCOUNTER (INPATIENT)
Facility: HOSPITAL | Age: 71
LOS: 12 days | Discharge: NURSING, REHAB OR LONG-TERM CARE | DRG: 628 | End: 2021-03-03
Attending: EMERGENCY MEDICINE | Admitting: HOSPITALIST
Payer: MEDICARE

## 2021-02-17 ENCOUNTER — TELEPHONE (OUTPATIENT)
Dept: WOUND CARE | Facility: CLINIC | Age: 71
End: 2021-02-17

## 2021-02-17 DIAGNOSIS — E66.01 MORBID OBESITY: ICD-10-CM

## 2021-02-17 DIAGNOSIS — M86.072 ACUTE HEMATOGENOUS OSTEOMYELITIS OF LEFT FOOT: Primary | ICD-10-CM

## 2021-02-17 DIAGNOSIS — L97.509 ISCHEMIC ULCER DIABETIC FOOT: ICD-10-CM

## 2021-02-17 DIAGNOSIS — D63.8 ANEMIA OF CHRONIC DISEASE: ICD-10-CM

## 2021-02-17 DIAGNOSIS — L03.116 CELLULITIS OF LEFT LOWER EXTREMITY WITHOUT FOOT: ICD-10-CM

## 2021-02-17 DIAGNOSIS — D50.8 IRON DEFICIENCY ANEMIA SECONDARY TO INADEQUATE DIETARY IRON INTAKE: ICD-10-CM

## 2021-02-17 DIAGNOSIS — E87.6 HYPOKALEMIA: ICD-10-CM

## 2021-02-17 DIAGNOSIS — M86.9 OSTEOMYELITIS OF LEFT FOOT, UNSPECIFIED TYPE: ICD-10-CM

## 2021-02-17 DIAGNOSIS — I10 ESSENTIAL HYPERTENSION: Chronic | ICD-10-CM

## 2021-02-17 DIAGNOSIS — R07.9 CHEST PAIN: ICD-10-CM

## 2021-02-17 DIAGNOSIS — I44.1 MOBITZ (TYPE) I (WENCKEBACH'S) ATRIOVENTRICULAR BLOCK: ICD-10-CM

## 2021-02-17 DIAGNOSIS — Z73.6 LIMITATION OF ACTIVITIES DUE TO DISABILITY: ICD-10-CM

## 2021-02-17 DIAGNOSIS — E11.621 ISCHEMIC ULCER DIABETIC FOOT: ICD-10-CM

## 2021-02-17 DIAGNOSIS — R00.1 BRADYCARDIA: ICD-10-CM

## 2021-02-17 LAB
ALBUMIN SERPL BCP-MCNC: 2.5 G/DL (ref 3.5–5.2)
ALP SERPL-CCNC: 84 U/L (ref 55–135)
ALT SERPL W/O P-5'-P-CCNC: 22 U/L (ref 10–44)
ANION GAP SERPL CALC-SCNC: 6 MMOL/L (ref 8–16)
AST SERPL-CCNC: 25 U/L (ref 10–40)
BASOPHILS # BLD AUTO: 0.03 K/UL (ref 0–0.2)
BASOPHILS NFR BLD: 0.3 % (ref 0–1.9)
BILIRUB SERPL-MCNC: 0.3 MG/DL (ref 0.1–1)
BUN SERPL-MCNC: 10 MG/DL (ref 8–23)
CALCIUM SERPL-MCNC: 9.2 MG/DL (ref 8.7–10.5)
CHLORIDE SERPL-SCNC: 95 MMOL/L (ref 95–110)
CO2 SERPL-SCNC: 32 MMOL/L (ref 23–29)
CREAT SERPL-MCNC: 1.1 MG/DL (ref 0.5–1.4)
CRP SERPL-MCNC: 88.1 MG/L (ref 0–8.2)
DIFFERENTIAL METHOD: ABNORMAL
EOSINOPHIL # BLD AUTO: 0.2 K/UL (ref 0–0.5)
EOSINOPHIL NFR BLD: 1.9 % (ref 0–8)
ERYTHROCYTE [DISTWIDTH] IN BLOOD BY AUTOMATED COUNT: 17.4 % (ref 11.5–14.5)
ERYTHROCYTE [SEDIMENTATION RATE] IN BLOOD BY WESTERGREN METHOD: >120 MM/HR (ref 0–23)
EST. GFR  (AFRICAN AMERICAN): >60 ML/MIN/1.73 M^2
EST. GFR  (NON AFRICAN AMERICAN): >60 ML/MIN/1.73 M^2
GLUCOSE SERPL-MCNC: 165 MG/DL (ref 70–110)
HCT VFR BLD AUTO: 29.6 % (ref 40–54)
HGB BLD-MCNC: 8.9 G/DL (ref 14–18)
IMM GRANULOCYTES # BLD AUTO: 0.04 K/UL (ref 0–0.04)
IMM GRANULOCYTES NFR BLD AUTO: 0.4 % (ref 0–0.5)
LACTATE SERPL-SCNC: 1.9 MMOL/L (ref 0.5–2.2)
LYMPHOCYTES # BLD AUTO: 1.6 K/UL (ref 1–4.8)
LYMPHOCYTES NFR BLD: 15.6 % (ref 18–48)
MCH RBC QN AUTO: 25.4 PG (ref 27–31)
MCHC RBC AUTO-ENTMCNC: 30.1 G/DL (ref 32–36)
MCV RBC AUTO: 85 FL (ref 82–98)
MONOCYTES # BLD AUTO: 0.8 K/UL (ref 0.3–1)
MONOCYTES NFR BLD: 7.7 % (ref 4–15)
NEUTROPHILS # BLD AUTO: 7.4 K/UL (ref 1.8–7.7)
NEUTROPHILS NFR BLD: 74.1 % (ref 38–73)
NRBC BLD-RTO: 0 /100 WBC
PLATELET # BLD AUTO: 627 K/UL (ref 150–350)
PMV BLD AUTO: 9.8 FL (ref 9.2–12.9)
POTASSIUM SERPL-SCNC: 3.4 MMOL/L (ref 3.5–5.1)
PROT SERPL-MCNC: 8.2 G/DL (ref 6–8.4)
RBC # BLD AUTO: 3.5 M/UL (ref 4.6–6.2)
SODIUM SERPL-SCNC: 133 MMOL/L (ref 136–145)
WBC # BLD AUTO: 9.99 K/UL (ref 3.9–12.7)

## 2021-02-17 PROCEDURE — 86140 C-REACTIVE PROTEIN: CPT

## 2021-02-17 PROCEDURE — 93005 ELECTROCARDIOGRAM TRACING: CPT

## 2021-02-17 PROCEDURE — 85652 RBC SED RATE AUTOMATED: CPT

## 2021-02-17 PROCEDURE — 99284 PR EMERGENCY DEPT VISIT,LEVEL IV: ICD-10-PCS | Mod: GC,CS,, | Performed by: EMERGENCY MEDICINE

## 2021-02-17 PROCEDURE — 80053 COMPREHEN METABOLIC PANEL: CPT

## 2021-02-17 PROCEDURE — 99284 EMERGENCY DEPT VISIT MOD MDM: CPT | Mod: GC,CS,, | Performed by: EMERGENCY MEDICINE

## 2021-02-17 PROCEDURE — 83605 ASSAY OF LACTIC ACID: CPT

## 2021-02-17 PROCEDURE — 99285 EMERGENCY DEPT VISIT HI MDM: CPT | Mod: 25

## 2021-02-17 PROCEDURE — 93010 EKG 12-LEAD: ICD-10-PCS | Mod: ,,, | Performed by: INTERNAL MEDICINE

## 2021-02-17 PROCEDURE — 96374 THER/PROPH/DIAG INJ IV PUSH: CPT

## 2021-02-17 PROCEDURE — 63600175 PHARM REV CODE 636 W HCPCS: Performed by: STUDENT IN AN ORGANIZED HEALTH CARE EDUCATION/TRAINING PROGRAM

## 2021-02-17 PROCEDURE — 85025 COMPLETE CBC W/AUTO DIFF WBC: CPT

## 2021-02-17 PROCEDURE — 93010 ELECTROCARDIOGRAM REPORT: CPT | Mod: ,,, | Performed by: INTERNAL MEDICINE

## 2021-02-17 RX ORDER — MORPHINE SULFATE 4 MG/ML
4 INJECTION, SOLUTION INTRAMUSCULAR; INTRAVENOUS
Status: COMPLETED | OUTPATIENT
Start: 2021-02-17 | End: 2021-02-17

## 2021-02-17 RX ADMIN — MORPHINE SULFATE 4 MG: 4 INJECTION INTRAVENOUS at 10:02

## 2021-02-18 PROBLEM — L03.116 CELLULITIS OF LEFT ANKLE: Status: ACTIVE | Noted: 2021-02-18

## 2021-02-18 PROBLEM — Z73.6 LIMITATION OF ACTIVITIES DUE TO DISABILITY: Status: ACTIVE | Noted: 2021-02-18

## 2021-02-18 PROBLEM — E66.01 MORBID OBESITY: Status: ACTIVE | Noted: 2021-02-18

## 2021-02-18 PROBLEM — L89.624 PRESSURE INJURY OF LEFT HEEL, STAGE 4: Status: ACTIVE | Noted: 2021-02-18

## 2021-02-18 PROBLEM — L03.116 CELLULITIS OF LEFT LOWER EXTREMITY WITHOUT FOOT: Status: ACTIVE | Noted: 2021-02-18

## 2021-02-18 PROBLEM — E87.6 HYPOKALEMIA: Status: ACTIVE | Noted: 2021-02-18

## 2021-02-18 LAB
ALBUMIN SERPL BCP-MCNC: 2.3 G/DL (ref 3.5–5.2)
ALP SERPL-CCNC: 80 U/L (ref 55–135)
ALT SERPL W/O P-5'-P-CCNC: 18 U/L (ref 10–44)
ANION GAP SERPL CALC-SCNC: 8 MMOL/L (ref 8–16)
ANISOCYTOSIS BLD QL SMEAR: SLIGHT
AST SERPL-CCNC: 17 U/L (ref 10–40)
BASOPHILS # BLD AUTO: 0.04 K/UL (ref 0–0.2)
BASOPHILS NFR BLD: 0.5 % (ref 0–1.9)
BILIRUB SERPL-MCNC: 0.3 MG/DL (ref 0.1–1)
BUN SERPL-MCNC: 9 MG/DL (ref 8–23)
CALCIUM SERPL-MCNC: 9 MG/DL (ref 8.7–10.5)
CHLORIDE SERPL-SCNC: 98 MMOL/L (ref 95–110)
CO2 SERPL-SCNC: 32 MMOL/L (ref 23–29)
CREAT SERPL-MCNC: 1 MG/DL (ref 0.5–1.4)
CTP QC/QA: YES
DIFFERENTIAL METHOD: ABNORMAL
EOSINOPHIL # BLD AUTO: 0.3 K/UL (ref 0–0.5)
EOSINOPHIL NFR BLD: 2.9 % (ref 0–8)
ERYTHROCYTE [DISTWIDTH] IN BLOOD BY AUTOMATED COUNT: 17.1 % (ref 11.5–14.5)
EST. GFR  (AFRICAN AMERICAN): >60 ML/MIN/1.73 M^2
EST. GFR  (NON AFRICAN AMERICAN): >60 ML/MIN/1.73 M^2
GLUCOSE SERPL-MCNC: 106 MG/DL (ref 70–110)
GLUCOSE SERPL-MCNC: 111 MG/DL (ref 70–110)
GLUCOSE SERPL-MCNC: 116 MG/DL (ref 70–110)
HCT VFR BLD AUTO: 29.8 % (ref 40–54)
HGB BLD-MCNC: 9 G/DL (ref 14–18)
HYPOCHROMIA BLD QL SMEAR: ABNORMAL
IMM GRANULOCYTES # BLD AUTO: 0.04 K/UL (ref 0–0.04)
IMM GRANULOCYTES NFR BLD AUTO: 0.5 % (ref 0–0.5)
LYMPHOCYTES # BLD AUTO: 1.9 K/UL (ref 1–4.8)
LYMPHOCYTES NFR BLD: 21.7 % (ref 18–48)
MAGNESIUM SERPL-MCNC: 1.7 MG/DL (ref 1.6–2.6)
MCH RBC QN AUTO: 25.4 PG (ref 27–31)
MCHC RBC AUTO-ENTMCNC: 30.2 G/DL (ref 32–36)
MCV RBC AUTO: 84 FL (ref 82–98)
MONOCYTES # BLD AUTO: 0.9 K/UL (ref 0.3–1)
MONOCYTES NFR BLD: 10 % (ref 4–15)
NEUTROPHILS # BLD AUTO: 5.5 K/UL (ref 1.8–7.7)
NEUTROPHILS NFR BLD: 64.4 % (ref 38–73)
NRBC BLD-RTO: 0 /100 WBC
OVALOCYTES BLD QL SMEAR: ABNORMAL
PHOSPHATE SERPL-MCNC: 3.6 MG/DL (ref 2.7–4.5)
PLATELET # BLD AUTO: 630 K/UL (ref 150–350)
PLATELET BLD QL SMEAR: ABNORMAL
PMV BLD AUTO: 8.8 FL (ref 9.2–12.9)
POCT GLUCOSE: 106 MG/DL (ref 70–110)
POCT GLUCOSE: 127 MG/DL (ref 70–110)
POCT GLUCOSE: 177 MG/DL (ref 70–110)
POCT GLUCOSE: 267 MG/DL (ref 70–110)
POIKILOCYTOSIS BLD QL SMEAR: SLIGHT
POTASSIUM SERPL-SCNC: 3 MMOL/L (ref 3.5–5.1)
PROT SERPL-MCNC: 7.7 G/DL (ref 6–8.4)
RBC # BLD AUTO: 3.55 M/UL (ref 4.6–6.2)
SARS-COV-2 RDRP RESP QL NAA+PROBE: NEGATIVE
SODIUM SERPL-SCNC: 138 MMOL/L (ref 136–145)
WBC # BLD AUTO: 8.57 K/UL (ref 3.9–12.7)

## 2021-02-18 PROCEDURE — 96372 THER/PROPH/DIAG INJ SC/IM: CPT

## 2021-02-18 PROCEDURE — 85025 COMPLETE CBC W/AUTO DIFF WBC: CPT

## 2021-02-18 PROCEDURE — 25000003 PHARM REV CODE 250: Performed by: INTERNAL MEDICINE

## 2021-02-18 PROCEDURE — 25000003 PHARM REV CODE 250: Performed by: STUDENT IN AN ORGANIZED HEALTH CARE EDUCATION/TRAINING PROGRAM

## 2021-02-18 PROCEDURE — 99233 SBSQ HOSP IP/OBS HIGH 50: CPT | Mod: ,,, | Performed by: PODIATRIST

## 2021-02-18 PROCEDURE — 99220 PR INITIAL OBSERVATION CARE,LEVL III: ICD-10-PCS | Mod: ,,, | Performed by: INTERNAL MEDICINE

## 2021-02-18 PROCEDURE — 96375 TX/PRO/DX INJ NEW DRUG ADDON: CPT

## 2021-02-18 PROCEDURE — C9399 UNCLASSIFIED DRUGS OR BIOLOG: HCPCS | Performed by: INTERNAL MEDICINE

## 2021-02-18 PROCEDURE — 99233 PR SUBSEQUENT HOSPITAL CARE,LEVL III: ICD-10-PCS | Mod: ,,, | Performed by: INTERNAL MEDICINE

## 2021-02-18 PROCEDURE — 25000003 PHARM REV CODE 250: Performed by: PHYSICIAN ASSISTANT

## 2021-02-18 PROCEDURE — G0378 HOSPITAL OBSERVATION PER HR: HCPCS

## 2021-02-18 PROCEDURE — 83735 ASSAY OF MAGNESIUM: CPT

## 2021-02-18 PROCEDURE — 63600175 PHARM REV CODE 636 W HCPCS: Performed by: INTERNAL MEDICINE

## 2021-02-18 PROCEDURE — 84100 ASSAY OF PHOSPHORUS: CPT

## 2021-02-18 PROCEDURE — 99233 SBSQ HOSP IP/OBS HIGH 50: CPT | Mod: ,,, | Performed by: INTERNAL MEDICINE

## 2021-02-18 PROCEDURE — 63600175 PHARM REV CODE 636 W HCPCS: Performed by: STUDENT IN AN ORGANIZED HEALTH CARE EDUCATION/TRAINING PROGRAM

## 2021-02-18 PROCEDURE — 25500020 PHARM REV CODE 255: Performed by: EMERGENCY MEDICINE

## 2021-02-18 PROCEDURE — 99233 PR SUBSEQUENT HOSPITAL CARE,LEVL III: ICD-10-PCS | Mod: ,,, | Performed by: PODIATRIST

## 2021-02-18 PROCEDURE — U0002 COVID-19 LAB TEST NON-CDC: HCPCS | Performed by: EMERGENCY MEDICINE

## 2021-02-18 PROCEDURE — 80053 COMPREHEN METABOLIC PANEL: CPT

## 2021-02-18 PROCEDURE — 99220 PR INITIAL OBSERVATION CARE,LEVL III: CPT | Mod: ,,, | Performed by: INTERNAL MEDICINE

## 2021-02-18 PROCEDURE — 63600175 PHARM REV CODE 636 W HCPCS: Performed by: PHYSICIAN ASSISTANT

## 2021-02-18 PROCEDURE — 96376 TX/PRO/DX INJ SAME DRUG ADON: CPT

## 2021-02-18 RX ORDER — CEFEPIME HYDROCHLORIDE 1 G/1
1 INJECTION, POWDER, FOR SOLUTION INTRAMUSCULAR; INTRAVENOUS
Status: DISCONTINUED | OUTPATIENT
Start: 2021-02-18 | End: 2021-02-26

## 2021-02-18 RX ORDER — PROMETHAZINE HYDROCHLORIDE 25 MG/1
25 TABLET ORAL EVERY 6 HOURS PRN
Status: DISCONTINUED | OUTPATIENT
Start: 2021-02-18 | End: 2021-03-03 | Stop reason: HOSPADM

## 2021-02-18 RX ORDER — HYDRALAZINE HYDROCHLORIDE 25 MG/1
25 TABLET, FILM COATED ORAL EVERY 8 HOURS
Status: DISCONTINUED | OUTPATIENT
Start: 2021-02-18 | End: 2021-02-18

## 2021-02-18 RX ORDER — ENOXAPARIN SODIUM 100 MG/ML
40 INJECTION SUBCUTANEOUS EVERY 24 HOURS
Status: DISCONTINUED | OUTPATIENT
Start: 2021-02-18 | End: 2021-02-19

## 2021-02-18 RX ORDER — TALC
6 POWDER (GRAM) TOPICAL NIGHTLY PRN
Status: DISCONTINUED | OUTPATIENT
Start: 2021-02-18 | End: 2021-03-03 | Stop reason: HOSPADM

## 2021-02-18 RX ORDER — ISOSORBIDE DINITRATE AND HYDRALAZINE HYDROCHLORIDE 37.5; 2 MG/1; MG/1
1 TABLET ORAL 3 TIMES DAILY
Status: DISCONTINUED | OUTPATIENT
Start: 2021-02-18 | End: 2021-02-21

## 2021-02-18 RX ORDER — ACETAMINOPHEN 325 MG/1
650 TABLET ORAL EVERY 8 HOURS PRN
Status: DISCONTINUED | OUTPATIENT
Start: 2021-02-18 | End: 2021-03-03 | Stop reason: HOSPADM

## 2021-02-18 RX ORDER — ATORVASTATIN CALCIUM 20 MG/1
40 TABLET, FILM COATED ORAL DAILY
Status: DISCONTINUED | OUTPATIENT
Start: 2021-02-18 | End: 2021-03-03 | Stop reason: HOSPADM

## 2021-02-18 RX ORDER — POTASSIUM CHLORIDE 20 MEQ/1
40 TABLET, EXTENDED RELEASE ORAL EVERY 4 HOURS
Status: COMPLETED | OUTPATIENT
Start: 2021-02-18 | End: 2021-02-18

## 2021-02-18 RX ORDER — SODIUM CHLORIDE 0.9 % (FLUSH) 0.9 %
10 SYRINGE (ML) INJECTION
Status: DISCONTINUED | OUTPATIENT
Start: 2021-02-18 | End: 2021-03-03 | Stop reason: HOSPADM

## 2021-02-18 RX ORDER — IBUPROFEN 200 MG
16 TABLET ORAL
Status: DISCONTINUED | OUTPATIENT
Start: 2021-02-18 | End: 2021-03-03 | Stop reason: HOSPADM

## 2021-02-18 RX ORDER — CEFEPIME HYDROCHLORIDE 2 G/1
2 INJECTION, POWDER, FOR SOLUTION INTRAVENOUS
Status: COMPLETED | OUTPATIENT
Start: 2021-02-18 | End: 2021-02-18

## 2021-02-18 RX ORDER — PANTOPRAZOLE SODIUM 40 MG/1
40 TABLET, DELAYED RELEASE ORAL DAILY
Status: DISCONTINUED | OUTPATIENT
Start: 2021-02-18 | End: 2021-03-03 | Stop reason: HOSPADM

## 2021-02-18 RX ORDER — HYDRALAZINE HYDROCHLORIDE 20 MG/ML
10 INJECTION INTRAMUSCULAR; INTRAVENOUS ONCE
Status: DISCONTINUED | OUTPATIENT
Start: 2021-02-18 | End: 2021-02-18

## 2021-02-18 RX ORDER — ONDANSETRON 8 MG/1
8 TABLET, ORALLY DISINTEGRATING ORAL EVERY 8 HOURS PRN
Status: DISCONTINUED | OUTPATIENT
Start: 2021-02-18 | End: 2021-03-03 | Stop reason: HOSPADM

## 2021-02-18 RX ORDER — ASPIRIN 81 MG/1
81 TABLET ORAL DAILY
Status: DISCONTINUED | OUTPATIENT
Start: 2021-02-18 | End: 2021-03-03 | Stop reason: HOSPADM

## 2021-02-18 RX ORDER — CEFEPIME HYDROCHLORIDE 1 G/1
1 INJECTION, POWDER, FOR SOLUTION INTRAMUSCULAR; INTRAVENOUS
Status: DISCONTINUED | OUTPATIENT
Start: 2021-02-18 | End: 2021-02-18

## 2021-02-18 RX ORDER — GLUCAGON 1 MG
1 KIT INJECTION
Status: DISCONTINUED | OUTPATIENT
Start: 2021-02-18 | End: 2021-03-03 | Stop reason: HOSPADM

## 2021-02-18 RX ORDER — HYDRALAZINE HYDROCHLORIDE 25 MG/1
25 TABLET, FILM COATED ORAL EVERY 8 HOURS PRN
Status: DISCONTINUED | OUTPATIENT
Start: 2021-02-18 | End: 2021-03-03 | Stop reason: HOSPADM

## 2021-02-18 RX ORDER — IBUPROFEN 200 MG
24 TABLET ORAL
Status: DISCONTINUED | OUTPATIENT
Start: 2021-02-18 | End: 2021-03-03 | Stop reason: HOSPADM

## 2021-02-18 RX ORDER — HYDRALAZINE HYDROCHLORIDE 25 MG/1
25 TABLET, FILM COATED ORAL ONCE
Status: COMPLETED | OUTPATIENT
Start: 2021-02-18 | End: 2021-02-18

## 2021-02-18 RX ORDER — POLYETHYLENE GLYCOL 3350 17 G/17G
17 POWDER, FOR SOLUTION ORAL 2 TIMES DAILY PRN
Status: DISCONTINUED | OUTPATIENT
Start: 2021-02-18 | End: 2021-02-20

## 2021-02-18 RX ORDER — SODIUM CHLORIDE 0.9 % (FLUSH) 0.9 %
10 SYRINGE (ML) INJECTION
Status: CANCELLED | OUTPATIENT
Start: 2021-02-18

## 2021-02-18 RX ORDER — INSULIN ASPART 100 [IU]/ML
0-5 INJECTION, SOLUTION INTRAVENOUS; SUBCUTANEOUS
Status: DISCONTINUED | OUTPATIENT
Start: 2021-02-18 | End: 2021-03-03 | Stop reason: HOSPADM

## 2021-02-18 RX ORDER — LOSARTAN POTASSIUM 50 MG/1
50 TABLET ORAL 2 TIMES DAILY
Status: DISCONTINUED | OUTPATIENT
Start: 2021-02-18 | End: 2021-03-03 | Stop reason: HOSPADM

## 2021-02-18 RX ORDER — MAGNESIUM SULFATE HEPTAHYDRATE 40 MG/ML
2 INJECTION, SOLUTION INTRAVENOUS ONCE
Status: COMPLETED | OUTPATIENT
Start: 2021-02-18 | End: 2021-02-18

## 2021-02-18 RX ORDER — GABAPENTIN 100 MG/1
200 CAPSULE ORAL 2 TIMES DAILY
Status: DISCONTINUED | OUTPATIENT
Start: 2021-02-18 | End: 2021-03-03 | Stop reason: HOSPADM

## 2021-02-18 RX ORDER — TALC
6 POWDER (GRAM) TOPICAL NIGHTLY PRN
Status: CANCELLED | OUTPATIENT
Start: 2021-02-18

## 2021-02-18 RX ORDER — IPRATROPIUM BROMIDE AND ALBUTEROL SULFATE 2.5; .5 MG/3ML; MG/3ML
3 SOLUTION RESPIRATORY (INHALATION) EVERY 6 HOURS PRN
Status: DISCONTINUED | OUTPATIENT
Start: 2021-02-18 | End: 2021-03-03 | Stop reason: HOSPADM

## 2021-02-18 RX ADMIN — CEFEPIME 1 G: 1 INJECTION, POWDER, FOR SOLUTION INTRAMUSCULAR; INTRAVENOUS at 12:02

## 2021-02-18 RX ADMIN — VANCOMYCIN HYDROCHLORIDE 2250 MG: 750 INJECTION, POWDER, LYOPHILIZED, FOR SOLUTION INTRAVENOUS at 02:02

## 2021-02-18 RX ADMIN — ASPIRIN 81 MG: 81 TABLET, COATED ORAL at 09:02

## 2021-02-18 RX ADMIN — LOSARTAN POTASSIUM 50 MG: 50 TABLET, FILM COATED ORAL at 02:02

## 2021-02-18 RX ADMIN — HYDRALAZINE HYDROCHLORIDE 25 MG: 25 TABLET, FILM COATED ORAL at 12:02

## 2021-02-18 RX ADMIN — ATORVASTATIN CALCIUM 40 MG: 20 TABLET, FILM COATED ORAL at 09:02

## 2021-02-18 RX ADMIN — ENOXAPARIN SODIUM 40 MG: 40 INJECTION SUBCUTANEOUS at 04:02

## 2021-02-18 RX ADMIN — CEFEPIME 1 G: 1 INJECTION, POWDER, FOR SOLUTION INTRAMUSCULAR; INTRAVENOUS at 10:02

## 2021-02-18 RX ADMIN — INSULIN DETEMIR 50 UNITS: 100 INJECTION, SOLUTION SUBCUTANEOUS at 03:02

## 2021-02-18 RX ADMIN — GABAPENTIN 200 MG: 100 CAPSULE ORAL at 09:02

## 2021-02-18 RX ADMIN — CEFEPIME 2 G: 2 INJECTION, POWDER, FOR SOLUTION INTRAVENOUS at 01:02

## 2021-02-18 RX ADMIN — HYDRALAZINE HYDROCHLORIDE 25 MG: 25 TABLET, FILM COATED ORAL at 10:02

## 2021-02-18 RX ADMIN — LOSARTAN POTASSIUM 50 MG: 50 TABLET, FILM COATED ORAL at 09:02

## 2021-02-18 RX ADMIN — PANTOPRAZOLE SODIUM 40 MG: 40 TABLET, DELAYED RELEASE ORAL at 09:02

## 2021-02-18 RX ADMIN — ACETAMINOPHEN 650 MG: 325 TABLET ORAL at 10:02

## 2021-02-18 RX ADMIN — HYDRALAZINE HYDROCHLORIDE 25 MG: 25 TABLET, FILM COATED ORAL at 07:02

## 2021-02-18 RX ADMIN — INSULIN DETEMIR 50 UNITS: 100 INJECTION, SOLUTION SUBCUTANEOUS at 10:02

## 2021-02-18 RX ADMIN — IOHEXOL 100 ML: 350 INJECTION, SOLUTION INTRAVENOUS at 12:02

## 2021-02-18 RX ADMIN — VANCOMYCIN HYDROCHLORIDE 1500 MG: 1.5 INJECTION, POWDER, LYOPHILIZED, FOR SOLUTION INTRAVENOUS at 03:02

## 2021-02-18 RX ADMIN — CEFEPIME 1 G: 1 INJECTION, POWDER, FOR SOLUTION INTRAMUSCULAR; INTRAVENOUS at 04:02

## 2021-02-18 RX ADMIN — POTASSIUM CHLORIDE 40 MEQ: 1500 TABLET, EXTENDED RELEASE ORAL at 09:02

## 2021-02-18 RX ADMIN — POTASSIUM CHLORIDE 40 MEQ: 1500 TABLET, EXTENDED RELEASE ORAL at 01:02

## 2021-02-18 RX ADMIN — INSULIN ASPART 1 UNITS: 100 INJECTION, SOLUTION INTRAVENOUS; SUBCUTANEOUS at 10:02

## 2021-02-18 RX ADMIN — HYDRALAZINE HYDROCHLORIDE AND ISOSORBIDE DINITRATE 1 TABLET: 37.5; 2 TABLET, FILM COATED ORAL at 09:02

## 2021-02-18 RX ADMIN — MAGNESIUM SULFATE 2 G: 2 INJECTION INTRAVENOUS at 09:02

## 2021-02-19 LAB
ALBUMIN SERPL BCP-MCNC: 2 G/DL (ref 3.5–5.2)
ALP SERPL-CCNC: 69 U/L (ref 55–135)
ALT SERPL W/O P-5'-P-CCNC: 17 U/L (ref 10–44)
ANION GAP SERPL CALC-SCNC: 7 MMOL/L (ref 8–16)
AST SERPL-CCNC: 16 U/L (ref 10–40)
BASOPHILS # BLD AUTO: 0.03 K/UL (ref 0–0.2)
BASOPHILS NFR BLD: 0.5 % (ref 0–1.9)
BILIRUB SERPL-MCNC: 0.3 MG/DL (ref 0.1–1)
BUN SERPL-MCNC: 9 MG/DL (ref 8–23)
CALCIUM SERPL-MCNC: 8.8 MG/DL (ref 8.7–10.5)
CHLORIDE SERPL-SCNC: 100 MMOL/L (ref 95–110)
CO2 SERPL-SCNC: 32 MMOL/L (ref 23–29)
CREAT SERPL-MCNC: 0.9 MG/DL (ref 0.5–1.4)
DIFFERENTIAL METHOD: ABNORMAL
EOSINOPHIL # BLD AUTO: 0.2 K/UL (ref 0–0.5)
EOSINOPHIL NFR BLD: 3.7 % (ref 0–8)
ERYTHROCYTE [DISTWIDTH] IN BLOOD BY AUTOMATED COUNT: 17.1 % (ref 11.5–14.5)
EST. GFR  (AFRICAN AMERICAN): >60 ML/MIN/1.73 M^2
EST. GFR  (NON AFRICAN AMERICAN): >60 ML/MIN/1.73 M^2
FERRITIN SERPL-MCNC: 190 NG/ML (ref 20–300)
FOLATE SERPL-MCNC: 4.2 NG/ML (ref 4–24)
GLUCOSE SERPL-MCNC: 85 MG/DL (ref 70–110)
HCT VFR BLD AUTO: 27.7 % (ref 40–54)
HGB BLD-MCNC: 8.4 G/DL (ref 14–18)
IMM GRANULOCYTES # BLD AUTO: 0.02 K/UL (ref 0–0.04)
IMM GRANULOCYTES NFR BLD AUTO: 0.3 % (ref 0–0.5)
IRON SERPL-MCNC: 20 UG/DL (ref 45–160)
LYMPHOCYTES # BLD AUTO: 1.2 K/UL (ref 1–4.8)
LYMPHOCYTES NFR BLD: 18.4 % (ref 18–48)
MAGNESIUM SERPL-MCNC: 2.1 MG/DL (ref 1.6–2.6)
MCH RBC QN AUTO: 25 PG (ref 27–31)
MCHC RBC AUTO-ENTMCNC: 30.3 G/DL (ref 32–36)
MCV RBC AUTO: 82 FL (ref 82–98)
MONOCYTES # BLD AUTO: 0.6 K/UL (ref 0.3–1)
MONOCYTES NFR BLD: 9 % (ref 4–15)
NEUTROPHILS # BLD AUTO: 4.4 K/UL (ref 1.8–7.7)
NEUTROPHILS NFR BLD: 68.1 % (ref 38–73)
NRBC BLD-RTO: 0 /100 WBC
PHOSPHATE SERPL-MCNC: 3.4 MG/DL (ref 2.7–4.5)
PLATELET # BLD AUTO: 586 K/UL (ref 150–350)
PMV BLD AUTO: 9.1 FL (ref 9.2–12.9)
POCT GLUCOSE: 117 MG/DL (ref 70–110)
POCT GLUCOSE: 185 MG/DL (ref 70–110)
POCT GLUCOSE: 186 MG/DL (ref 70–110)
POCT GLUCOSE: 234 MG/DL (ref 70–110)
POTASSIUM SERPL-SCNC: 3.5 MMOL/L (ref 3.5–5.1)
PROT SERPL-MCNC: 7 G/DL (ref 6–8.4)
RBC # BLD AUTO: 3.36 M/UL (ref 4.6–6.2)
SATURATED IRON: 13 % (ref 20–50)
SODIUM SERPL-SCNC: 139 MMOL/L (ref 136–145)
TOTAL IRON BINDING CAPACITY: 160 UG/DL (ref 250–450)
TRANSFERRIN SERPL-MCNC: 108 MG/DL (ref 200–375)
VANCOMYCIN TROUGH SERPL-MCNC: 21.3 UG/ML (ref 10–22)
VIT B12 SERPL-MCNC: 403 PG/ML (ref 210–950)
WBC # BLD AUTO: 6.47 K/UL (ref 3.9–12.7)

## 2021-02-19 PROCEDURE — 96372 THER/PROPH/DIAG INJ SC/IM: CPT

## 2021-02-19 PROCEDURE — 63600175 PHARM REV CODE 636 W HCPCS: Performed by: INTERNAL MEDICINE

## 2021-02-19 PROCEDURE — 82746 ASSAY OF FOLIC ACID SERUM: CPT

## 2021-02-19 PROCEDURE — 25500020 PHARM REV CODE 255: Performed by: INTERNAL MEDICINE

## 2021-02-19 PROCEDURE — 80202 ASSAY OF VANCOMYCIN: CPT

## 2021-02-19 PROCEDURE — 83735 ASSAY OF MAGNESIUM: CPT

## 2021-02-19 PROCEDURE — 25000003 PHARM REV CODE 250: Performed by: INTERNAL MEDICINE

## 2021-02-19 PROCEDURE — 11000001 HC ACUTE MED/SURG PRIVATE ROOM

## 2021-02-19 PROCEDURE — 99233 SBSQ HOSP IP/OBS HIGH 50: CPT | Mod: ,,, | Performed by: PODIATRIST

## 2021-02-19 PROCEDURE — 25000003 PHARM REV CODE 250: Performed by: STUDENT IN AN ORGANIZED HEALTH CARE EDUCATION/TRAINING PROGRAM

## 2021-02-19 PROCEDURE — 82728 ASSAY OF FERRITIN: CPT

## 2021-02-19 PROCEDURE — 36415 COLL VENOUS BLD VENIPUNCTURE: CPT

## 2021-02-19 PROCEDURE — A9585 GADOBUTROL INJECTION: HCPCS | Performed by: INTERNAL MEDICINE

## 2021-02-19 PROCEDURE — 83540 ASSAY OF IRON: CPT

## 2021-02-19 PROCEDURE — 96376 TX/PRO/DX INJ SAME DRUG ADON: CPT

## 2021-02-19 PROCEDURE — 84100 ASSAY OF PHOSPHORUS: CPT

## 2021-02-19 PROCEDURE — 82607 VITAMIN B-12: CPT

## 2021-02-19 PROCEDURE — 99233 PR SUBSEQUENT HOSPITAL CARE,LEVL III: ICD-10-PCS | Mod: ,,, | Performed by: PHYSICIAN ASSISTANT

## 2021-02-19 PROCEDURE — 99233 PR SUBSEQUENT HOSPITAL CARE,LEVL III: ICD-10-PCS | Mod: ,,, | Performed by: PODIATRIST

## 2021-02-19 PROCEDURE — 85025 COMPLETE CBC W/AUTO DIFF WBC: CPT

## 2021-02-19 PROCEDURE — 99233 SBSQ HOSP IP/OBS HIGH 50: CPT | Mod: ,,, | Performed by: PHYSICIAN ASSISTANT

## 2021-02-19 PROCEDURE — 63600175 PHARM REV CODE 636 W HCPCS: Performed by: PHYSICIAN ASSISTANT

## 2021-02-19 PROCEDURE — 25000003 PHARM REV CODE 250: Performed by: PHYSICIAN ASSISTANT

## 2021-02-19 PROCEDURE — 80053 COMPREHEN METABOLIC PANEL: CPT

## 2021-02-19 RX ORDER — ENOXAPARIN SODIUM 100 MG/ML
40 INJECTION SUBCUTANEOUS EVERY 12 HOURS
Status: DISCONTINUED | OUTPATIENT
Start: 2021-02-19 | End: 2021-03-03 | Stop reason: HOSPADM

## 2021-02-19 RX ORDER — GADOBUTROL 604.72 MG/ML
10 INJECTION INTRAVENOUS
Status: COMPLETED | OUTPATIENT
Start: 2021-02-19 | End: 2021-02-19

## 2021-02-19 RX ADMIN — CEFEPIME 1 G: 1 INJECTION, POWDER, FOR SOLUTION INTRAMUSCULAR; INTRAVENOUS at 11:02

## 2021-02-19 RX ADMIN — GABAPENTIN 200 MG: 100 CAPSULE ORAL at 08:02

## 2021-02-19 RX ADMIN — LOSARTAN POTASSIUM 50 MG: 50 TABLET, FILM COATED ORAL at 09:02

## 2021-02-19 RX ADMIN — HYDRALAZINE HYDROCHLORIDE AND ISOSORBIDE DINITRATE 1 TABLET: 37.5; 2 TABLET, FILM COATED ORAL at 08:02

## 2021-02-19 RX ADMIN — Medication: at 01:02

## 2021-02-19 RX ADMIN — CEFEPIME 1 G: 1 INJECTION, POWDER, FOR SOLUTION INTRAMUSCULAR; INTRAVENOUS at 05:02

## 2021-02-19 RX ADMIN — VANCOMYCIN HYDROCHLORIDE 1500 MG: 1.5 INJECTION, POWDER, LYOPHILIZED, FOR SOLUTION INTRAVENOUS at 03:02

## 2021-02-19 RX ADMIN — ASPIRIN 81 MG: 81 TABLET, COATED ORAL at 08:02

## 2021-02-19 RX ADMIN — GABAPENTIN 200 MG: 100 CAPSULE ORAL at 09:02

## 2021-02-19 RX ADMIN — INSULIN DETEMIR 50 UNITS: 100 INJECTION, SOLUTION SUBCUTANEOUS at 10:02

## 2021-02-19 RX ADMIN — ENOXAPARIN SODIUM 40 MG: 40 INJECTION SUBCUTANEOUS at 04:02

## 2021-02-19 RX ADMIN — PANTOPRAZOLE SODIUM 40 MG: 40 TABLET, DELAYED RELEASE ORAL at 08:02

## 2021-02-19 RX ADMIN — VANCOMYCIN HYDROCHLORIDE 1250 MG: 1.25 INJECTION, POWDER, LYOPHILIZED, FOR SOLUTION INTRAVENOUS at 04:02

## 2021-02-19 RX ADMIN — CEFEPIME 1 G: 1 INJECTION, POWDER, FOR SOLUTION INTRAMUSCULAR; INTRAVENOUS at 09:02

## 2021-02-19 RX ADMIN — ATORVASTATIN CALCIUM 40 MG: 20 TABLET, FILM COATED ORAL at 08:02

## 2021-02-19 RX ADMIN — HYDRALAZINE HYDROCHLORIDE AND ISOSORBIDE DINITRATE 1 TABLET: 37.5; 2 TABLET, FILM COATED ORAL at 09:02

## 2021-02-19 RX ADMIN — HYDRALAZINE HYDROCHLORIDE AND ISOSORBIDE DINITRATE 1 TABLET: 37.5; 2 TABLET, FILM COATED ORAL at 03:02

## 2021-02-19 RX ADMIN — GADOBUTROL 10 ML: 604.72 INJECTION INTRAVENOUS at 10:02

## 2021-02-19 RX ADMIN — LOSARTAN POTASSIUM 50 MG: 50 TABLET, FILM COATED ORAL at 08:02

## 2021-02-20 LAB
ALBUMIN SERPL BCP-MCNC: 1.9 G/DL (ref 3.5–5.2)
ALP SERPL-CCNC: 66 U/L (ref 55–135)
ALT SERPL W/O P-5'-P-CCNC: 15 U/L (ref 10–44)
ANION GAP SERPL CALC-SCNC: 7 MMOL/L (ref 8–16)
AST SERPL-CCNC: 13 U/L (ref 10–40)
BASOPHILS # BLD AUTO: 0.03 K/UL (ref 0–0.2)
BASOPHILS NFR BLD: 0.4 % (ref 0–1.9)
BILIRUB SERPL-MCNC: 0.2 MG/DL (ref 0.1–1)
BUN SERPL-MCNC: 11 MG/DL (ref 8–23)
CALCIUM SERPL-MCNC: 8.4 MG/DL (ref 8.7–10.5)
CHLORIDE SERPL-SCNC: 100 MMOL/L (ref 95–110)
CO2 SERPL-SCNC: 32 MMOL/L (ref 23–29)
CREAT SERPL-MCNC: 1 MG/DL (ref 0.5–1.4)
DIFFERENTIAL METHOD: ABNORMAL
EOSINOPHIL # BLD AUTO: 0.3 K/UL (ref 0–0.5)
EOSINOPHIL NFR BLD: 4.2 % (ref 0–8)
ERYTHROCYTE [DISTWIDTH] IN BLOOD BY AUTOMATED COUNT: 17 % (ref 11.5–14.5)
EST. GFR  (AFRICAN AMERICAN): >60 ML/MIN/1.73 M^2
EST. GFR  (NON AFRICAN AMERICAN): >60 ML/MIN/1.73 M^2
GLUCOSE SERPL-MCNC: 199 MG/DL (ref 70–110)
HCT VFR BLD AUTO: 26.4 % (ref 40–54)
HGB BLD-MCNC: 8.2 G/DL (ref 14–18)
IMM GRANULOCYTES # BLD AUTO: 0.02 K/UL (ref 0–0.04)
IMM GRANULOCYTES NFR BLD AUTO: 0.3 % (ref 0–0.5)
LYMPHOCYTES # BLD AUTO: 1.3 K/UL (ref 1–4.8)
LYMPHOCYTES NFR BLD: 18.8 % (ref 18–48)
MAGNESIUM SERPL-MCNC: 1.9 MG/DL (ref 1.6–2.6)
MCH RBC QN AUTO: 25.5 PG (ref 27–31)
MCHC RBC AUTO-ENTMCNC: 31.1 G/DL (ref 32–36)
MCV RBC AUTO: 82 FL (ref 82–98)
MONOCYTES # BLD AUTO: 0.7 K/UL (ref 0.3–1)
MONOCYTES NFR BLD: 11 % (ref 4–15)
NEUTROPHILS # BLD AUTO: 4.4 K/UL (ref 1.8–7.7)
NEUTROPHILS NFR BLD: 65.3 % (ref 38–73)
NRBC BLD-RTO: 0 /100 WBC
PHOSPHATE SERPL-MCNC: 3 MG/DL (ref 2.7–4.5)
PLATELET # BLD AUTO: 552 K/UL (ref 150–350)
PMV BLD AUTO: 9.5 FL (ref 9.2–12.9)
POCT GLUCOSE: 185 MG/DL (ref 70–110)
POCT GLUCOSE: 228 MG/DL (ref 70–110)
POTASSIUM SERPL-SCNC: 3.6 MMOL/L (ref 3.5–5.1)
PROT SERPL-MCNC: 6.6 G/DL (ref 6–8.4)
RBC # BLD AUTO: 3.22 M/UL (ref 4.6–6.2)
SODIUM SERPL-SCNC: 139 MMOL/L (ref 136–145)
WBC # BLD AUTO: 6.72 K/UL (ref 3.9–12.7)

## 2021-02-20 PROCEDURE — 99233 SBSQ HOSP IP/OBS HIGH 50: CPT | Mod: ,,, | Performed by: PHYSICIAN ASSISTANT

## 2021-02-20 PROCEDURE — 80053 COMPREHEN METABOLIC PANEL: CPT

## 2021-02-20 PROCEDURE — 63600175 PHARM REV CODE 636 W HCPCS: Performed by: PHYSICIAN ASSISTANT

## 2021-02-20 PROCEDURE — 36415 COLL VENOUS BLD VENIPUNCTURE: CPT

## 2021-02-20 PROCEDURE — 99233 PR SUBSEQUENT HOSPITAL CARE,LEVL III: ICD-10-PCS | Mod: ,,, | Performed by: PHYSICIAN ASSISTANT

## 2021-02-20 PROCEDURE — 25000003 PHARM REV CODE 250: Performed by: INTERNAL MEDICINE

## 2021-02-20 PROCEDURE — 63600175 PHARM REV CODE 636 W HCPCS: Performed by: INTERNAL MEDICINE

## 2021-02-20 PROCEDURE — 11000001 HC ACUTE MED/SURG PRIVATE ROOM

## 2021-02-20 PROCEDURE — 83735 ASSAY OF MAGNESIUM: CPT

## 2021-02-20 PROCEDURE — 25000003 PHARM REV CODE 250: Performed by: PHYSICIAN ASSISTANT

## 2021-02-20 PROCEDURE — 85025 COMPLETE CBC W/AUTO DIFF WBC: CPT

## 2021-02-20 PROCEDURE — 84100 ASSAY OF PHOSPHORUS: CPT

## 2021-02-20 RX ORDER — POLYETHYLENE GLYCOL 3350 17 G/17G
17 POWDER, FOR SOLUTION ORAL 2 TIMES DAILY
Status: DISCONTINUED | OUTPATIENT
Start: 2021-02-20 | End: 2021-03-03 | Stop reason: HOSPADM

## 2021-02-20 RX ORDER — SENNOSIDES 8.6 MG/1
8.6 TABLET ORAL 2 TIMES DAILY
Status: DISCONTINUED | OUTPATIENT
Start: 2021-02-20 | End: 2021-03-03 | Stop reason: HOSPADM

## 2021-02-20 RX ADMIN — HYDRALAZINE HYDROCHLORIDE AND ISOSORBIDE DINITRATE 1 TABLET: 37.5; 2 TABLET, FILM COATED ORAL at 09:02

## 2021-02-20 RX ADMIN — HYDRALAZINE HYDROCHLORIDE AND ISOSORBIDE DINITRATE 1 TABLET: 37.5; 2 TABLET, FILM COATED ORAL at 04:02

## 2021-02-20 RX ADMIN — ASPIRIN 81 MG: 81 TABLET, COATED ORAL at 08:02

## 2021-02-20 RX ADMIN — GABAPENTIN 200 MG: 100 CAPSULE ORAL at 09:02

## 2021-02-20 RX ADMIN — LOSARTAN POTASSIUM 50 MG: 50 TABLET, FILM COATED ORAL at 08:02

## 2021-02-20 RX ADMIN — LOSARTAN POTASSIUM 50 MG: 50 TABLET, FILM COATED ORAL at 09:02

## 2021-02-20 RX ADMIN — INSULIN DETEMIR 50 UNITS: 100 INJECTION, SOLUTION SUBCUTANEOUS at 09:02

## 2021-02-20 RX ADMIN — POLYETHYLENE GLYCOL 3350 17 G: 17 POWDER, FOR SOLUTION ORAL at 12:02

## 2021-02-20 RX ADMIN — CEFEPIME 1 G: 1 INJECTION, POWDER, FOR SOLUTION INTRAMUSCULAR; INTRAVENOUS at 04:02

## 2021-02-20 RX ADMIN — ENOXAPARIN SODIUM 40 MG: 40 INJECTION SUBCUTANEOUS at 09:02

## 2021-02-20 RX ADMIN — INSULIN ASPART 2 UNITS: 100 INJECTION, SOLUTION INTRAVENOUS; SUBCUTANEOUS at 12:02

## 2021-02-20 RX ADMIN — ONDANSETRON 8 MG: 8 TABLET, ORALLY DISINTEGRATING ORAL at 04:02

## 2021-02-20 RX ADMIN — VANCOMYCIN HYDROCHLORIDE 1250 MG: 1.25 INJECTION, POWDER, LYOPHILIZED, FOR SOLUTION INTRAVENOUS at 04:02

## 2021-02-20 RX ADMIN — ATORVASTATIN CALCIUM 40 MG: 20 TABLET, FILM COATED ORAL at 08:02

## 2021-02-20 RX ADMIN — SENNOSIDES 8.6 MG: 8.6 TABLET, FILM COATED ORAL at 09:02

## 2021-02-20 RX ADMIN — ACETAMINOPHEN 650 MG: 325 TABLET ORAL at 12:02

## 2021-02-20 RX ADMIN — CEFEPIME 1 G: 1 INJECTION, POWDER, FOR SOLUTION INTRAMUSCULAR; INTRAVENOUS at 09:02

## 2021-02-20 RX ADMIN — MELATONIN TAB 3 MG 6 MG: 3 TAB at 09:02

## 2021-02-20 RX ADMIN — SENNOSIDES 8.6 MG: 8.6 TABLET, FILM COATED ORAL at 12:02

## 2021-02-20 RX ADMIN — GABAPENTIN 200 MG: 100 CAPSULE ORAL at 08:02

## 2021-02-20 RX ADMIN — INSULIN ASPART 1 UNITS: 100 INJECTION, SOLUTION INTRAVENOUS; SUBCUTANEOUS at 09:02

## 2021-02-20 RX ADMIN — CEFEPIME 1 G: 1 INJECTION, POWDER, FOR SOLUTION INTRAMUSCULAR; INTRAVENOUS at 12:02

## 2021-02-20 RX ADMIN — PANTOPRAZOLE SODIUM 40 MG: 40 TABLET, DELAYED RELEASE ORAL at 08:02

## 2021-02-20 RX ADMIN — ENOXAPARIN SODIUM 40 MG: 40 INJECTION SUBCUTANEOUS at 08:02

## 2021-02-20 RX ADMIN — HYDRALAZINE HYDROCHLORIDE AND ISOSORBIDE DINITRATE 1 TABLET: 37.5; 2 TABLET, FILM COATED ORAL at 08:02

## 2021-02-21 PROBLEM — D50.9 IRON DEFICIENCY ANEMIA: Status: ACTIVE | Noted: 2021-02-21

## 2021-02-21 PROBLEM — M86.9 OSTEOMYELITIS OF LEFT FOOT: Status: ACTIVE | Noted: 2021-02-18

## 2021-02-21 LAB
ALBUMIN SERPL BCP-MCNC: 2 G/DL (ref 3.5–5.2)
ALP SERPL-CCNC: 68 U/L (ref 55–135)
ALT SERPL W/O P-5'-P-CCNC: 14 U/L (ref 10–44)
ANION GAP SERPL CALC-SCNC: 10 MMOL/L (ref 8–16)
ANISOCYTOSIS BLD QL SMEAR: SLIGHT
AST SERPL-CCNC: 14 U/L (ref 10–40)
BASOPHILS # BLD AUTO: 0.06 K/UL (ref 0–0.2)
BASOPHILS NFR BLD: 0.9 % (ref 0–1.9)
BILIRUB SERPL-MCNC: 0.2 MG/DL (ref 0.1–1)
BUN SERPL-MCNC: 12 MG/DL (ref 8–23)
CALCIUM SERPL-MCNC: 8.3 MG/DL (ref 8.7–10.5)
CHLORIDE SERPL-SCNC: 96 MMOL/L (ref 95–110)
CO2 SERPL-SCNC: 28 MMOL/L (ref 23–29)
CREAT SERPL-MCNC: 1.1 MG/DL (ref 0.5–1.4)
DIFFERENTIAL METHOD: ABNORMAL
EOSINOPHIL # BLD AUTO: 0.3 K/UL (ref 0–0.5)
EOSINOPHIL NFR BLD: 5 % (ref 0–8)
ERYTHROCYTE [DISTWIDTH] IN BLOOD BY AUTOMATED COUNT: 17 % (ref 11.5–14.5)
EST. GFR  (AFRICAN AMERICAN): >60 ML/MIN/1.73 M^2
EST. GFR  (NON AFRICAN AMERICAN): >60 ML/MIN/1.73 M^2
GLUCOSE SERPL-MCNC: 279 MG/DL (ref 70–110)
HCT VFR BLD AUTO: 27.6 % (ref 40–54)
HGB BLD-MCNC: 8.4 G/DL (ref 14–18)
HYPOCHROMIA BLD QL SMEAR: ABNORMAL
IMM GRANULOCYTES # BLD AUTO: 0.03 K/UL (ref 0–0.04)
IMM GRANULOCYTES NFR BLD AUTO: 0.4 % (ref 0–0.5)
LYMPHOCYTES # BLD AUTO: 1.3 K/UL (ref 1–4.8)
LYMPHOCYTES NFR BLD: 19.2 % (ref 18–48)
MAGNESIUM SERPL-MCNC: 1.7 MG/DL (ref 1.6–2.6)
MCH RBC QN AUTO: 25.1 PG (ref 27–31)
MCHC RBC AUTO-ENTMCNC: 30.4 G/DL (ref 32–36)
MCV RBC AUTO: 83 FL (ref 82–98)
MONOCYTES # BLD AUTO: 0.6 K/UL (ref 0.3–1)
MONOCYTES NFR BLD: 8.6 % (ref 4–15)
NEUTROPHILS # BLD AUTO: 4.5 K/UL (ref 1.8–7.7)
NEUTROPHILS NFR BLD: 65.9 % (ref 38–73)
NRBC BLD-RTO: 0 /100 WBC
OVALOCYTES BLD QL SMEAR: ABNORMAL
PHOSPHATE SERPL-MCNC: 3.3 MG/DL (ref 2.7–4.5)
PLATELET # BLD AUTO: 528 K/UL (ref 150–350)
PLATELET BLD QL SMEAR: ABNORMAL
PMV BLD AUTO: 9.6 FL (ref 9.2–12.9)
POCT GLUCOSE: 228 MG/DL (ref 70–110)
POCT GLUCOSE: 250 MG/DL (ref 70–110)
POCT GLUCOSE: 252 MG/DL (ref 70–110)
POCT GLUCOSE: 259 MG/DL (ref 70–110)
POCT GLUCOSE: 273 MG/DL (ref 70–110)
POCT GLUCOSE: 292 MG/DL (ref 70–110)
POIKILOCYTOSIS BLD QL SMEAR: SLIGHT
POTASSIUM SERPL-SCNC: 4.1 MMOL/L (ref 3.5–5.1)
PROT SERPL-MCNC: 6.9 G/DL (ref 6–8.4)
RBC # BLD AUTO: 3.34 M/UL (ref 4.6–6.2)
SODIUM SERPL-SCNC: 134 MMOL/L (ref 136–145)
VANCOMYCIN TROUGH SERPL-MCNC: 19 UG/ML (ref 10–22)
WBC # BLD AUTO: 6.83 K/UL (ref 3.9–12.7)

## 2021-02-21 PROCEDURE — 25000003 PHARM REV CODE 250: Performed by: INTERNAL MEDICINE

## 2021-02-21 PROCEDURE — 85025 COMPLETE CBC W/AUTO DIFF WBC: CPT

## 2021-02-21 PROCEDURE — 80202 ASSAY OF VANCOMYCIN: CPT

## 2021-02-21 PROCEDURE — 11000001 HC ACUTE MED/SURG PRIVATE ROOM

## 2021-02-21 PROCEDURE — 84100 ASSAY OF PHOSPHORUS: CPT

## 2021-02-21 PROCEDURE — 99233 PR SUBSEQUENT HOSPITAL CARE,LEVL III: ICD-10-PCS | Mod: ,,, | Performed by: PHYSICIAN ASSISTANT

## 2021-02-21 PROCEDURE — 25000003 PHARM REV CODE 250: Performed by: PHYSICIAN ASSISTANT

## 2021-02-21 PROCEDURE — 80053 COMPREHEN METABOLIC PANEL: CPT

## 2021-02-21 PROCEDURE — 83735 ASSAY OF MAGNESIUM: CPT

## 2021-02-21 PROCEDURE — 99233 PR SUBSEQUENT HOSPITAL CARE,LEVL III: ICD-10-PCS | Mod: ,,, | Performed by: PODIATRIST

## 2021-02-21 PROCEDURE — 99233 SBSQ HOSP IP/OBS HIGH 50: CPT | Mod: ,,, | Performed by: PODIATRIST

## 2021-02-21 PROCEDURE — 99233 SBSQ HOSP IP/OBS HIGH 50: CPT | Mod: ,,, | Performed by: PHYSICIAN ASSISTANT

## 2021-02-21 PROCEDURE — 63600175 PHARM REV CODE 636 W HCPCS: Performed by: INTERNAL MEDICINE

## 2021-02-21 PROCEDURE — 63600175 PHARM REV CODE 636 W HCPCS: Performed by: PHYSICIAN ASSISTANT

## 2021-02-21 RX ORDER — INSULIN ASPART 100 [IU]/ML
5 INJECTION, SOLUTION INTRAVENOUS; SUBCUTANEOUS
Status: DISCONTINUED | OUTPATIENT
Start: 2021-02-21 | End: 2021-02-24

## 2021-02-21 RX ORDER — ISOSORBIDE DINITRATE AND HYDRALAZINE HYDROCHLORIDE 37.5; 2 MG/1; MG/1
2 TABLET ORAL 3 TIMES DAILY
Status: DISCONTINUED | OUTPATIENT
Start: 2021-02-21 | End: 2021-03-03 | Stop reason: HOSPADM

## 2021-02-21 RX ADMIN — ASPIRIN 81 MG: 81 TABLET, COATED ORAL at 08:02

## 2021-02-21 RX ADMIN — MELATONIN TAB 3 MG 6 MG: 3 TAB at 09:02

## 2021-02-21 RX ADMIN — ATORVASTATIN CALCIUM 40 MG: 20 TABLET, FILM COATED ORAL at 08:02

## 2021-02-21 RX ADMIN — INSULIN ASPART 3 UNITS: 100 INJECTION, SOLUTION INTRAVENOUS; SUBCUTANEOUS at 08:02

## 2021-02-21 RX ADMIN — INSULIN ASPART 1 UNITS: 100 INJECTION, SOLUTION INTRAVENOUS; SUBCUTANEOUS at 09:02

## 2021-02-21 RX ADMIN — SENNOSIDES 8.6 MG: 8.6 TABLET, FILM COATED ORAL at 08:02

## 2021-02-21 RX ADMIN — ENOXAPARIN SODIUM 40 MG: 40 INJECTION SUBCUTANEOUS at 08:02

## 2021-02-21 RX ADMIN — PANTOPRAZOLE SODIUM 40 MG: 40 TABLET, DELAYED RELEASE ORAL at 08:02

## 2021-02-21 RX ADMIN — INSULIN ASPART 5 UNITS: 100 INJECTION, SOLUTION INTRAVENOUS; SUBCUTANEOUS at 01:02

## 2021-02-21 RX ADMIN — INSULIN ASPART 2 UNITS: 100 INJECTION, SOLUTION INTRAVENOUS; SUBCUTANEOUS at 05:02

## 2021-02-21 RX ADMIN — CEFEPIME 1 G: 1 INJECTION, POWDER, FOR SOLUTION INTRAMUSCULAR; INTRAVENOUS at 01:02

## 2021-02-21 RX ADMIN — HYDRALAZINE HYDROCHLORIDE AND ISOSORBIDE DINITRATE 2 TABLET: 37.5; 2 TABLET, FILM COATED ORAL at 09:02

## 2021-02-21 RX ADMIN — VANCOMYCIN HYDROCHLORIDE 1250 MG: 1.25 INJECTION, POWDER, LYOPHILIZED, FOR SOLUTION INTRAVENOUS at 05:02

## 2021-02-21 RX ADMIN — GABAPENTIN 200 MG: 100 CAPSULE ORAL at 08:02

## 2021-02-21 RX ADMIN — HYDRALAZINE HYDROCHLORIDE AND ISOSORBIDE DINITRATE 2 TABLET: 37.5; 2 TABLET, FILM COATED ORAL at 03:02

## 2021-02-21 RX ADMIN — ENOXAPARIN SODIUM 40 MG: 40 INJECTION SUBCUTANEOUS at 09:02

## 2021-02-21 RX ADMIN — HYDRALAZINE HYDROCHLORIDE AND ISOSORBIDE DINITRATE 2 TABLET: 37.5; 2 TABLET, FILM COATED ORAL at 08:02

## 2021-02-21 RX ADMIN — SENNOSIDES 8.6 MG: 8.6 TABLET, FILM COATED ORAL at 09:02

## 2021-02-21 RX ADMIN — LOSARTAN POTASSIUM 50 MG: 50 TABLET, FILM COATED ORAL at 08:02

## 2021-02-21 RX ADMIN — POLYETHYLENE GLYCOL 3350 17 G: 17 POWDER, FOR SOLUTION ORAL at 09:02

## 2021-02-21 RX ADMIN — CEFEPIME 1 G: 1 INJECTION, POWDER, FOR SOLUTION INTRAMUSCULAR; INTRAVENOUS at 05:02

## 2021-02-21 RX ADMIN — INSULIN DETEMIR 50 UNITS: 100 INJECTION, SOLUTION SUBCUTANEOUS at 09:02

## 2021-02-21 RX ADMIN — CEFEPIME 1 G: 1 INJECTION, POWDER, FOR SOLUTION INTRAMUSCULAR; INTRAVENOUS at 09:02

## 2021-02-21 RX ADMIN — LOSARTAN POTASSIUM 50 MG: 50 TABLET, FILM COATED ORAL at 09:02

## 2021-02-21 RX ADMIN — GABAPENTIN 200 MG: 100 CAPSULE ORAL at 09:02

## 2021-02-21 RX ADMIN — INSULIN ASPART 3 UNITS: 100 INJECTION, SOLUTION INTRAVENOUS; SUBCUTANEOUS at 01:02

## 2021-02-21 RX ADMIN — INSULIN ASPART 5 UNITS: 100 INJECTION, SOLUTION INTRAVENOUS; SUBCUTANEOUS at 05:02

## 2021-02-22 ENCOUNTER — ANESTHESIA EVENT (OUTPATIENT)
Dept: SURGERY | Facility: HOSPITAL | Age: 71
DRG: 628 | End: 2021-02-22
Payer: MEDICARE

## 2021-02-22 LAB
ALBUMIN SERPL BCP-MCNC: 2 G/DL (ref 3.5–5.2)
ALP SERPL-CCNC: 63 U/L (ref 55–135)
ALT SERPL W/O P-5'-P-CCNC: 13 U/L (ref 10–44)
ANION GAP SERPL CALC-SCNC: 7 MMOL/L (ref 8–16)
AST SERPL-CCNC: 13 U/L (ref 10–40)
BASOPHILS # BLD AUTO: 0.05 K/UL (ref 0–0.2)
BASOPHILS NFR BLD: 0.6 % (ref 0–1.9)
BILIRUB SERPL-MCNC: 0.2 MG/DL (ref 0.1–1)
BUN SERPL-MCNC: 14 MG/DL (ref 8–23)
CALCIUM SERPL-MCNC: 8.4 MG/DL (ref 8.7–10.5)
CHLORIDE SERPL-SCNC: 99 MMOL/L (ref 95–110)
CO2 SERPL-SCNC: 32 MMOL/L (ref 23–29)
CREAT SERPL-MCNC: 1.2 MG/DL (ref 0.5–1.4)
DIFFERENTIAL METHOD: ABNORMAL
EOSINOPHIL # BLD AUTO: 0.3 K/UL (ref 0–0.5)
EOSINOPHIL NFR BLD: 4.4 % (ref 0–8)
ERYTHROCYTE [DISTWIDTH] IN BLOOD BY AUTOMATED COUNT: 17.2 % (ref 11.5–14.5)
EST. GFR  (AFRICAN AMERICAN): >60 ML/MIN/1.73 M^2
EST. GFR  (NON AFRICAN AMERICAN): >60 ML/MIN/1.73 M^2
GLUCOSE SERPL-MCNC: 246 MG/DL (ref 70–110)
HCT VFR BLD AUTO: 26.1 % (ref 40–54)
HGB BLD-MCNC: 7.7 G/DL (ref 14–18)
IMM GRANULOCYTES # BLD AUTO: 0.03 K/UL (ref 0–0.04)
IMM GRANULOCYTES NFR BLD AUTO: 0.4 % (ref 0–0.5)
LYMPHOCYTES # BLD AUTO: 1.5 K/UL (ref 1–4.8)
LYMPHOCYTES NFR BLD: 19.1 % (ref 18–48)
MAGNESIUM SERPL-MCNC: 1.7 MG/DL (ref 1.6–2.6)
MCH RBC QN AUTO: 24.9 PG (ref 27–31)
MCHC RBC AUTO-ENTMCNC: 29.5 G/DL (ref 32–36)
MCV RBC AUTO: 85 FL (ref 82–98)
MONOCYTES # BLD AUTO: 0.6 K/UL (ref 0.3–1)
MONOCYTES NFR BLD: 8.3 % (ref 4–15)
NEUTROPHILS # BLD AUTO: 5.2 K/UL (ref 1.8–7.7)
NEUTROPHILS NFR BLD: 67.2 % (ref 38–73)
NRBC BLD-RTO: 0 /100 WBC
PHOSPHATE SERPL-MCNC: 3.3 MG/DL (ref 2.7–4.5)
PLATELET # BLD AUTO: 497 K/UL (ref 150–350)
PMV BLD AUTO: 9.4 FL (ref 9.2–12.9)
POCT GLUCOSE: 184 MG/DL (ref 70–110)
POCT GLUCOSE: 232 MG/DL (ref 70–110)
POCT GLUCOSE: 235 MG/DL (ref 70–110)
POCT GLUCOSE: 275 MG/DL (ref 70–110)
POCT GLUCOSE: 295 MG/DL (ref 70–110)
POTASSIUM SERPL-SCNC: 3.8 MMOL/L (ref 3.5–5.1)
PROT SERPL-MCNC: 6.7 G/DL (ref 6–8.4)
RBC # BLD AUTO: 3.09 M/UL (ref 4.6–6.2)
SARS-COV-2 RNA RESP QL NAA+PROBE: NOT DETECTED
SODIUM SERPL-SCNC: 138 MMOL/L (ref 136–145)
VANCOMYCIN TROUGH SERPL-MCNC: 20.8 UG/ML (ref 10–22)
WBC # BLD AUTO: 7.7 K/UL (ref 3.9–12.7)

## 2021-02-22 PROCEDURE — 83735 ASSAY OF MAGNESIUM: CPT

## 2021-02-22 PROCEDURE — U0003 INFECTIOUS AGENT DETECTION BY NUCLEIC ACID (DNA OR RNA); SEVERE ACUTE RESPIRATORY SYNDROME CORONAVIRUS 2 (SARS-COV-2) (CORONAVIRUS DISEASE [COVID-19]), AMPLIFIED PROBE TECHNIQUE, MAKING USE OF HIGH THROUGHPUT TECHNOLOGIES AS DESCRIBED BY CMS-2020-01-R: HCPCS

## 2021-02-22 PROCEDURE — 99233 PR SUBSEQUENT HOSPITAL CARE,LEVL III: ICD-10-PCS | Mod: ,,, | Performed by: PHYSICIAN ASSISTANT

## 2021-02-22 PROCEDURE — 11000001 HC ACUTE MED/SURG PRIVATE ROOM

## 2021-02-22 PROCEDURE — 25000003 PHARM REV CODE 250: Performed by: INTERNAL MEDICINE

## 2021-02-22 PROCEDURE — 36415 COLL VENOUS BLD VENIPUNCTURE: CPT

## 2021-02-22 PROCEDURE — 25000003 PHARM REV CODE 250: Performed by: PHYSICIAN ASSISTANT

## 2021-02-22 PROCEDURE — 80053 COMPREHEN METABOLIC PANEL: CPT

## 2021-02-22 PROCEDURE — 84100 ASSAY OF PHOSPHORUS: CPT

## 2021-02-22 PROCEDURE — U0005 INFEC AGEN DETEC AMPLI PROBE: HCPCS

## 2021-02-22 PROCEDURE — 63600175 PHARM REV CODE 636 W HCPCS: Performed by: PHYSICIAN ASSISTANT

## 2021-02-22 PROCEDURE — 63600175 PHARM REV CODE 636 W HCPCS: Performed by: INTERNAL MEDICINE

## 2021-02-22 PROCEDURE — 99233 SBSQ HOSP IP/OBS HIGH 50: CPT | Mod: ,,, | Performed by: PHYSICIAN ASSISTANT

## 2021-02-22 PROCEDURE — 97530 THERAPEUTIC ACTIVITIES: CPT

## 2021-02-22 PROCEDURE — 80202 ASSAY OF VANCOMYCIN: CPT

## 2021-02-22 PROCEDURE — 85025 COMPLETE CBC W/AUTO DIFF WBC: CPT

## 2021-02-22 PROCEDURE — 97165 OT EVAL LOW COMPLEX 30 MIN: CPT

## 2021-02-22 PROCEDURE — 97161 PT EVAL LOW COMPLEX 20 MIN: CPT

## 2021-02-22 RX ORDER — BISACODYL 10 MG
10 SUPPOSITORY, RECTAL RECTAL ONCE
Status: DISCONTINUED | OUTPATIENT
Start: 2021-02-22 | End: 2021-03-03

## 2021-02-22 RX ORDER — VANCOMYCIN HCL IN 5 % DEXTROSE 1G/250ML
1000 PLASTIC BAG, INJECTION (ML) INTRAVENOUS
Status: DISCONTINUED | OUTPATIENT
Start: 2021-02-23 | End: 2021-02-25

## 2021-02-22 RX ADMIN — HYDRALAZINE HYDROCHLORIDE AND ISOSORBIDE DINITRATE 2 TABLET: 37.5; 2 TABLET, FILM COATED ORAL at 03:02

## 2021-02-22 RX ADMIN — VANCOMYCIN HYDROCHLORIDE 1250 MG: 1.25 INJECTION, POWDER, LYOPHILIZED, FOR SOLUTION INTRAVENOUS at 05:02

## 2021-02-22 RX ADMIN — HYDRALAZINE HYDROCHLORIDE AND ISOSORBIDE DINITRATE 2 TABLET: 37.5; 2 TABLET, FILM COATED ORAL at 08:02

## 2021-02-22 RX ADMIN — GABAPENTIN 200 MG: 100 CAPSULE ORAL at 08:02

## 2021-02-22 RX ADMIN — PANTOPRAZOLE SODIUM 40 MG: 40 TABLET, DELAYED RELEASE ORAL at 08:02

## 2021-02-22 RX ADMIN — INSULIN ASPART 5 UNITS: 100 INJECTION, SOLUTION INTRAVENOUS; SUBCUTANEOUS at 04:02

## 2021-02-22 RX ADMIN — INSULIN ASPART 5 UNITS: 100 INJECTION, SOLUTION INTRAVENOUS; SUBCUTANEOUS at 08:02

## 2021-02-22 RX ADMIN — INSULIN ASPART 5 UNITS: 100 INJECTION, SOLUTION INTRAVENOUS; SUBCUTANEOUS at 12:02

## 2021-02-22 RX ADMIN — POLYETHYLENE GLYCOL 3350 17 G: 17 POWDER, FOR SOLUTION ORAL at 08:02

## 2021-02-22 RX ADMIN — CEFEPIME 1 G: 1 INJECTION, POWDER, FOR SOLUTION INTRAMUSCULAR; INTRAVENOUS at 08:02

## 2021-02-22 RX ADMIN — ASPIRIN 81 MG: 81 TABLET, COATED ORAL at 08:02

## 2021-02-22 RX ADMIN — INSULIN ASPART 2 UNITS: 100 INJECTION, SOLUTION INTRAVENOUS; SUBCUTANEOUS at 12:02

## 2021-02-22 RX ADMIN — CEFEPIME 1 G: 1 INJECTION, POWDER, FOR SOLUTION INTRAMUSCULAR; INTRAVENOUS at 12:02

## 2021-02-22 RX ADMIN — SENNOSIDES 8.6 MG: 8.6 TABLET, FILM COATED ORAL at 08:02

## 2021-02-22 RX ADMIN — CEFEPIME 1 G: 1 INJECTION, POWDER, FOR SOLUTION INTRAMUSCULAR; INTRAVENOUS at 05:02

## 2021-02-22 RX ADMIN — INSULIN ASPART 3 UNITS: 100 INJECTION, SOLUTION INTRAVENOUS; SUBCUTANEOUS at 08:02

## 2021-02-22 RX ADMIN — ATORVASTATIN CALCIUM 40 MG: 20 TABLET, FILM COATED ORAL at 08:02

## 2021-02-22 RX ADMIN — INSULIN DETEMIR 50 UNITS: 100 INJECTION, SOLUTION SUBCUTANEOUS at 08:02

## 2021-02-22 RX ADMIN — ENOXAPARIN SODIUM 40 MG: 40 INJECTION SUBCUTANEOUS at 08:02

## 2021-02-22 RX ADMIN — LOSARTAN POTASSIUM 50 MG: 50 TABLET, FILM COATED ORAL at 08:02

## 2021-02-23 ENCOUNTER — ANESTHESIA (OUTPATIENT)
Dept: SURGERY | Facility: HOSPITAL | Age: 71
DRG: 628 | End: 2021-02-23
Payer: MEDICARE

## 2021-02-23 LAB
ABO + RH BLD: NORMAL
ALBUMIN SERPL BCP-MCNC: 2.1 G/DL (ref 3.5–5.2)
ALP SERPL-CCNC: 69 U/L (ref 55–135)
ALT SERPL W/O P-5'-P-CCNC: 15 U/L (ref 10–44)
ANION GAP SERPL CALC-SCNC: 6 MMOL/L (ref 8–16)
AST SERPL-CCNC: 17 U/L (ref 10–40)
BASOPHILS # BLD AUTO: 0.05 K/UL (ref 0–0.2)
BASOPHILS NFR BLD: 0.7 % (ref 0–1.9)
BILIRUB SERPL-MCNC: 0.2 MG/DL (ref 0.1–1)
BLD GP AB SCN CELLS X3 SERPL QL: NORMAL
BUN SERPL-MCNC: 15 MG/DL (ref 8–23)
CALCIUM SERPL-MCNC: 8.8 MG/DL (ref 8.7–10.5)
CHLORIDE SERPL-SCNC: 100 MMOL/L (ref 95–110)
CO2 SERPL-SCNC: 32 MMOL/L (ref 23–29)
CREAT SERPL-MCNC: 1 MG/DL (ref 0.5–1.4)
DIFFERENTIAL METHOD: ABNORMAL
EOSINOPHIL # BLD AUTO: 0.4 K/UL (ref 0–0.5)
EOSINOPHIL NFR BLD: 5.2 % (ref 0–8)
ERYTHROCYTE [DISTWIDTH] IN BLOOD BY AUTOMATED COUNT: 17.2 % (ref 11.5–14.5)
EST. GFR  (AFRICAN AMERICAN): >60 ML/MIN/1.73 M^2
EST. GFR  (NON AFRICAN AMERICAN): >60 ML/MIN/1.73 M^2
GLUCOSE SERPL-MCNC: 170 MG/DL (ref 70–110)
GRAM STN SPEC: NORMAL
GRAM STN SPEC: NORMAL
HCT VFR BLD AUTO: 28.4 % (ref 40–54)
HGB BLD-MCNC: 8.8 G/DL (ref 14–18)
IMM GRANULOCYTES # BLD AUTO: 0.02 K/UL (ref 0–0.04)
IMM GRANULOCYTES NFR BLD AUTO: 0.3 % (ref 0–0.5)
LYMPHOCYTES # BLD AUTO: 1.4 K/UL (ref 1–4.8)
LYMPHOCYTES NFR BLD: 20.7 % (ref 18–48)
MAGNESIUM SERPL-MCNC: 1.7 MG/DL (ref 1.6–2.6)
MCH RBC QN AUTO: 25.1 PG (ref 27–31)
MCHC RBC AUTO-ENTMCNC: 31 G/DL (ref 32–36)
MCV RBC AUTO: 81 FL (ref 82–98)
MONOCYTES # BLD AUTO: 0.6 K/UL (ref 0.3–1)
MONOCYTES NFR BLD: 9 % (ref 4–15)
NEUTROPHILS # BLD AUTO: 4.4 K/UL (ref 1.8–7.7)
NEUTROPHILS NFR BLD: 64.1 % (ref 38–73)
NRBC BLD-RTO: 0 /100 WBC
PLATELET # BLD AUTO: 506 K/UL (ref 150–350)
PMV BLD AUTO: 9 FL (ref 9.2–12.9)
POCT GLUCOSE: 164 MG/DL (ref 70–110)
POCT GLUCOSE: 166 MG/DL (ref 70–110)
POCT GLUCOSE: 187 MG/DL (ref 70–110)
POCT GLUCOSE: 187 MG/DL (ref 70–110)
POCT GLUCOSE: 205 MG/DL (ref 70–110)
POTASSIUM SERPL-SCNC: 4 MMOL/L (ref 3.5–5.1)
PROT SERPL-MCNC: 7.3 G/DL (ref 6–8.4)
RBC # BLD AUTO: 3.51 M/UL (ref 4.6–6.2)
SODIUM SERPL-SCNC: 138 MMOL/L (ref 136–145)
WBC # BLD AUTO: 6.9 K/UL (ref 3.9–12.7)

## 2021-02-23 PROCEDURE — D9220A PRA ANESTHESIA: Mod: ,,, | Performed by: ANESTHESIOLOGY

## 2021-02-23 PROCEDURE — 82962 GLUCOSE BLOOD TEST: CPT | Performed by: PODIATRIST

## 2021-02-23 PROCEDURE — 80053 COMPREHEN METABOLIC PANEL: CPT

## 2021-02-23 PROCEDURE — 88305 TISSUE EXAM BY PATHOLOGIST: CPT | Performed by: STUDENT IN AN ORGANIZED HEALTH CARE EDUCATION/TRAINING PROGRAM

## 2021-02-23 PROCEDURE — 88305 TISSUE EXAM BY PATHOLOGIST: CPT | Mod: 26,,, | Performed by: STUDENT IN AN ORGANIZED HEALTH CARE EDUCATION/TRAINING PROGRAM

## 2021-02-23 PROCEDURE — 11044 DBRDMT BONE 1ST 20 SQ CM/<: CPT | Mod: ,,, | Performed by: PODIATRIST

## 2021-02-23 PROCEDURE — 87075 CULTR BACTERIA EXCEPT BLOOD: CPT

## 2021-02-23 PROCEDURE — 36000706: Performed by: PODIATRIST

## 2021-02-23 PROCEDURE — 88305 TISSUE EXAM BY PATHOLOGIST: ICD-10-PCS | Mod: 26,,, | Performed by: STUDENT IN AN ORGANIZED HEALTH CARE EDUCATION/TRAINING PROGRAM

## 2021-02-23 PROCEDURE — 37000009 HC ANESTHESIA EA ADD 15 MINS: Performed by: PODIATRIST

## 2021-02-23 PROCEDURE — D9220A PRA ANESTHESIA: ICD-10-PCS | Mod: ,,, | Performed by: ANESTHESIOLOGY

## 2021-02-23 PROCEDURE — 25000003 PHARM REV CODE 250: Performed by: PHYSICIAN ASSISTANT

## 2021-02-23 PROCEDURE — 71000015 HC POSTOP RECOV 1ST HR: Performed by: PODIATRIST

## 2021-02-23 PROCEDURE — 63600175 PHARM REV CODE 636 W HCPCS: Performed by: STUDENT IN AN ORGANIZED HEALTH CARE EDUCATION/TRAINING PROGRAM

## 2021-02-23 PROCEDURE — 85025 COMPLETE CBC W/AUTO DIFF WBC: CPT

## 2021-02-23 PROCEDURE — 11047 DBRDMT BONE EACH ADDL: CPT | Mod: ,,, | Performed by: PODIATRIST

## 2021-02-23 PROCEDURE — 11000001 HC ACUTE MED/SURG PRIVATE ROOM

## 2021-02-23 PROCEDURE — 36415 COLL VENOUS BLD VENIPUNCTURE: CPT

## 2021-02-23 PROCEDURE — 99233 SBSQ HOSP IP/OBS HIGH 50: CPT | Mod: ,,, | Performed by: PHYSICIAN ASSISTANT

## 2021-02-23 PROCEDURE — 87205 SMEAR GRAM STAIN: CPT

## 2021-02-23 PROCEDURE — 36000707: Performed by: PODIATRIST

## 2021-02-23 PROCEDURE — 87102 FUNGUS ISOLATION CULTURE: CPT

## 2021-02-23 PROCEDURE — 86900 BLOOD TYPING SEROLOGIC ABO: CPT

## 2021-02-23 PROCEDURE — 99233 PR SUBSEQUENT HOSPITAL CARE,LEVL III: ICD-10-PCS | Mod: ,,, | Performed by: PHYSICIAN ASSISTANT

## 2021-02-23 PROCEDURE — 87116 MYCOBACTERIA CULTURE: CPT

## 2021-02-23 PROCEDURE — 63600175 PHARM REV CODE 636 W HCPCS: Performed by: INTERNAL MEDICINE

## 2021-02-23 PROCEDURE — 87206 SMEAR FLUORESCENT/ACID STAI: CPT

## 2021-02-23 PROCEDURE — 25000003 PHARM REV CODE 250: Performed by: INTERNAL MEDICINE

## 2021-02-23 PROCEDURE — 25000003 PHARM REV CODE 250: Performed by: STUDENT IN AN ORGANIZED HEALTH CARE EDUCATION/TRAINING PROGRAM

## 2021-02-23 PROCEDURE — 87076 CULTURE ANAEROBE IDENT EACH: CPT

## 2021-02-23 PROCEDURE — 11047: ICD-10-PCS | Mod: ,,, | Performed by: PODIATRIST

## 2021-02-23 PROCEDURE — 83735 ASSAY OF MAGNESIUM: CPT

## 2021-02-23 PROCEDURE — 71000044 HC DOSC ROUTINE RECOVERY FIRST HOUR: Performed by: PODIATRIST

## 2021-02-23 PROCEDURE — 11044 PR DEBRIDEMENT, SKIN, SUB-Q TISSUE,MUSCLE,BONE,=<20 SQ CM: ICD-10-PCS | Mod: ,,, | Performed by: PODIATRIST

## 2021-02-23 PROCEDURE — 87070 CULTURE OTHR SPECIMN AEROBIC: CPT

## 2021-02-23 PROCEDURE — 25000003 PHARM REV CODE 250: Performed by: PODIATRIST

## 2021-02-23 PROCEDURE — 37000008 HC ANESTHESIA 1ST 15 MINUTES: Performed by: PODIATRIST

## 2021-02-23 PROCEDURE — 63600175 PHARM REV CODE 636 W HCPCS: Performed by: PHYSICIAN ASSISTANT

## 2021-02-23 RX ORDER — LIDOCAINE HYDROCHLORIDE AND EPINEPHRINE 10; 10 MG/ML; UG/ML
INJECTION, SOLUTION INFILTRATION; PERINEURAL
Status: DISCONTINUED | OUTPATIENT
Start: 2021-02-23 | End: 2021-02-23 | Stop reason: HOSPADM

## 2021-02-23 RX ORDER — BUPIVACAINE HYDROCHLORIDE 5 MG/ML
INJECTION, SOLUTION EPIDURAL; INTRACAUDAL
Status: DISPENSED
Start: 2021-02-23 | End: 2021-02-23

## 2021-02-23 RX ORDER — BUPIVACAINE HYDROCHLORIDE 5 MG/ML
INJECTION, SOLUTION EPIDURAL; INTRACAUDAL
Status: DISCONTINUED | OUTPATIENT
Start: 2021-02-23 | End: 2021-02-23 | Stop reason: HOSPADM

## 2021-02-23 RX ORDER — FENTANYL CITRATE 50 UG/ML
INJECTION, SOLUTION INTRAMUSCULAR; INTRAVENOUS
Status: DISCONTINUED | OUTPATIENT
Start: 2021-02-23 | End: 2021-02-23

## 2021-02-23 RX ORDER — PROPOFOL 10 MG/ML
VIAL (ML) INTRAVENOUS
Status: DISCONTINUED | OUTPATIENT
Start: 2021-02-23 | End: 2021-02-23

## 2021-02-23 RX ORDER — LIDOCAINE HYDROCHLORIDE 10 MG/ML
INJECTION INFILTRATION; PERINEURAL
Status: DISPENSED
Start: 2021-02-23 | End: 2021-02-23

## 2021-02-23 RX ORDER — SODIUM CHLORIDE 0.9 % (FLUSH) 0.9 %
10 SYRINGE (ML) INJECTION
Status: DISCONTINUED | OUTPATIENT
Start: 2021-02-23 | End: 2021-02-23 | Stop reason: HOSPADM

## 2021-02-23 RX ORDER — HYDROMORPHONE HYDROCHLORIDE 1 MG/ML
0.2 INJECTION, SOLUTION INTRAMUSCULAR; INTRAVENOUS; SUBCUTANEOUS EVERY 5 MIN PRN
Status: DISCONTINUED | OUTPATIENT
Start: 2021-02-23 | End: 2021-02-23 | Stop reason: HOSPADM

## 2021-02-23 RX ADMIN — SENNOSIDES 8.6 MG: 8.6 TABLET, FILM COATED ORAL at 08:02

## 2021-02-23 RX ADMIN — ATORVASTATIN CALCIUM 40 MG: 20 TABLET, FILM COATED ORAL at 08:02

## 2021-02-23 RX ADMIN — LOSARTAN POTASSIUM 50 MG: 50 TABLET, FILM COATED ORAL at 08:02

## 2021-02-23 RX ADMIN — PROPOFOL 30 MG: 10 INJECTION, EMULSION INTRAVENOUS at 11:02

## 2021-02-23 RX ADMIN — VANCOMYCIN HYDROCHLORIDE 1000 MG: 1 INJECTION, POWDER, LYOPHILIZED, FOR SOLUTION INTRAVENOUS at 08:02

## 2021-02-23 RX ADMIN — FENTANYL CITRATE 25 MCG: 50 INJECTION, SOLUTION INTRAMUSCULAR; INTRAVENOUS at 11:02

## 2021-02-23 RX ADMIN — GABAPENTIN 200 MG: 100 CAPSULE ORAL at 08:02

## 2021-02-23 RX ADMIN — INSULIN ASPART 2 UNITS: 100 INJECTION, SOLUTION INTRAVENOUS; SUBCUTANEOUS at 05:02

## 2021-02-23 RX ADMIN — CEFEPIME 1 G: 1 INJECTION, POWDER, FOR SOLUTION INTRAMUSCULAR; INTRAVENOUS at 09:02

## 2021-02-23 RX ADMIN — CEFEPIME 1 G: 1 INJECTION, POWDER, FOR SOLUTION INTRAMUSCULAR; INTRAVENOUS at 04:02

## 2021-02-23 RX ADMIN — POLYETHYLENE GLYCOL 3350 17 G: 17 POWDER, FOR SOLUTION ORAL at 08:02

## 2021-02-23 RX ADMIN — PROPOFOL 150 MCG/KG/MIN: 10 INJECTION, EMULSION INTRAVENOUS at 11:02

## 2021-02-23 RX ADMIN — ENOXAPARIN SODIUM 40 MG: 40 INJECTION SUBCUTANEOUS at 08:02

## 2021-02-23 RX ADMIN — INSULIN ASPART 5 UNITS: 100 INJECTION, SOLUTION INTRAVENOUS; SUBCUTANEOUS at 05:02

## 2021-02-23 RX ADMIN — INSULIN DETEMIR 50 UNITS: 100 INJECTION, SOLUTION SUBCUTANEOUS at 09:02

## 2021-02-23 RX ADMIN — HYDRALAZINE HYDROCHLORIDE AND ISOSORBIDE DINITRATE 2 TABLET: 37.5; 2 TABLET, FILM COATED ORAL at 04:02

## 2021-02-23 RX ADMIN — CEFEPIME 1 G: 1 INJECTION, POWDER, FOR SOLUTION INTRAMUSCULAR; INTRAVENOUS at 01:02

## 2021-02-23 RX ADMIN — GLYCOPYRROLATE 0.1 MCG: 0.2 INJECTION, SOLUTION INTRAMUSCULAR; INTRAVITREAL at 11:02

## 2021-02-23 RX ADMIN — ASPIRIN 81 MG: 81 TABLET, COATED ORAL at 08:02

## 2021-02-23 RX ADMIN — HYDRALAZINE HYDROCHLORIDE AND ISOSORBIDE DINITRATE 2 TABLET: 37.5; 2 TABLET, FILM COATED ORAL at 08:02

## 2021-02-23 RX ADMIN — SODIUM CHLORIDE: 0.9 INJECTION, SOLUTION INTRAVENOUS at 11:02

## 2021-02-23 RX ADMIN — PANTOPRAZOLE SODIUM 40 MG: 40 TABLET, DELAYED RELEASE ORAL at 08:02

## 2021-02-24 LAB
ANION GAP SERPL CALC-SCNC: 9 MMOL/L (ref 8–16)
BASOPHILS # BLD AUTO: 0.03 K/UL (ref 0–0.2)
BASOPHILS NFR BLD: 0.5 % (ref 0–1.9)
BUN SERPL-MCNC: 16 MG/DL (ref 8–23)
CALCIUM SERPL-MCNC: 8.8 MG/DL (ref 8.7–10.5)
CHLORIDE SERPL-SCNC: 100 MMOL/L (ref 95–110)
CO2 SERPL-SCNC: 29 MMOL/L (ref 23–29)
CREAT SERPL-MCNC: 1.2 MG/DL (ref 0.5–1.4)
DIFFERENTIAL METHOD: ABNORMAL
EOSINOPHIL # BLD AUTO: 0.3 K/UL (ref 0–0.5)
EOSINOPHIL NFR BLD: 5.1 % (ref 0–8)
ERYTHROCYTE [DISTWIDTH] IN BLOOD BY AUTOMATED COUNT: 17.4 % (ref 11.5–14.5)
EST. GFR  (AFRICAN AMERICAN): >60 ML/MIN/1.73 M^2
EST. GFR  (NON AFRICAN AMERICAN): >60 ML/MIN/1.73 M^2
GLUCOSE SERPL-MCNC: 173 MG/DL (ref 70–110)
HCT VFR BLD AUTO: 27.9 % (ref 40–54)
HGB BLD-MCNC: 8.7 G/DL (ref 14–18)
IMM GRANULOCYTES # BLD AUTO: 0.04 K/UL (ref 0–0.04)
IMM GRANULOCYTES NFR BLD AUTO: 0.6 % (ref 0–0.5)
LYMPHOCYTES # BLD AUTO: 1.3 K/UL (ref 1–4.8)
LYMPHOCYTES NFR BLD: 19.6 % (ref 18–48)
MCH RBC QN AUTO: 24.9 PG (ref 27–31)
MCHC RBC AUTO-ENTMCNC: 31.2 G/DL (ref 32–36)
MCV RBC AUTO: 80 FL (ref 82–98)
MONOCYTES # BLD AUTO: 0.6 K/UL (ref 0.3–1)
MONOCYTES NFR BLD: 9.4 % (ref 4–15)
NEUTROPHILS # BLD AUTO: 4.2 K/UL (ref 1.8–7.7)
NEUTROPHILS NFR BLD: 64.8 % (ref 38–73)
NRBC BLD-RTO: 0 /100 WBC
PLATELET # BLD AUTO: 458 K/UL (ref 150–350)
PMV BLD AUTO: 9.5 FL (ref 9.2–12.9)
POCT GLUCOSE: 207 MG/DL (ref 70–110)
POCT GLUCOSE: 208 MG/DL (ref 70–110)
POCT GLUCOSE: 215 MG/DL (ref 70–110)
POCT GLUCOSE: 266 MG/DL (ref 70–110)
POTASSIUM SERPL-SCNC: 4.2 MMOL/L (ref 3.5–5.1)
RBC # BLD AUTO: 3.49 M/UL (ref 4.6–6.2)
SODIUM SERPL-SCNC: 138 MMOL/L (ref 136–145)
VANCOMYCIN TROUGH SERPL-MCNC: 15.5 UG/ML (ref 10–22)
WBC # BLD AUTO: 6.52 K/UL (ref 3.9–12.7)

## 2021-02-24 PROCEDURE — 85025 COMPLETE CBC W/AUTO DIFF WBC: CPT

## 2021-02-24 PROCEDURE — 63600175 PHARM REV CODE 636 W HCPCS: Performed by: INTERNAL MEDICINE

## 2021-02-24 PROCEDURE — 99223 1ST HOSP IP/OBS HIGH 75: CPT | Mod: ,,, | Performed by: NURSE PRACTITIONER

## 2021-02-24 PROCEDURE — 80202 ASSAY OF VANCOMYCIN: CPT

## 2021-02-24 PROCEDURE — 25000003 PHARM REV CODE 250: Performed by: INTERNAL MEDICINE

## 2021-02-24 PROCEDURE — 99233 SBSQ HOSP IP/OBS HIGH 50: CPT | Mod: ,,, | Performed by: PODIATRIST

## 2021-02-24 PROCEDURE — 97110 THERAPEUTIC EXERCISES: CPT

## 2021-02-24 PROCEDURE — 99223 PR INITIAL HOSPITAL CARE,LEVL III: ICD-10-PCS | Mod: ,,, | Performed by: NURSE PRACTITIONER

## 2021-02-24 PROCEDURE — 99233 PR SUBSEQUENT HOSPITAL CARE,LEVL III: ICD-10-PCS | Mod: ,,, | Performed by: PODIATRIST

## 2021-02-24 PROCEDURE — 63600175 PHARM REV CODE 636 W HCPCS: Performed by: PHYSICIAN ASSISTANT

## 2021-02-24 PROCEDURE — 99233 SBSQ HOSP IP/OBS HIGH 50: CPT | Mod: ,,, | Performed by: PHYSICIAN ASSISTANT

## 2021-02-24 PROCEDURE — 36415 COLL VENOUS BLD VENIPUNCTURE: CPT

## 2021-02-24 PROCEDURE — 11000001 HC ACUTE MED/SURG PRIVATE ROOM

## 2021-02-24 PROCEDURE — 80048 BASIC METABOLIC PNL TOTAL CA: CPT

## 2021-02-24 PROCEDURE — 25000003 PHARM REV CODE 250: Performed by: PHYSICIAN ASSISTANT

## 2021-02-24 PROCEDURE — 99233 PR SUBSEQUENT HOSPITAL CARE,LEVL III: ICD-10-PCS | Mod: ,,, | Performed by: PHYSICIAN ASSISTANT

## 2021-02-24 RX ORDER — INSULIN ASPART 100 [IU]/ML
8 INJECTION, SOLUTION INTRAVENOUS; SUBCUTANEOUS
Status: DISCONTINUED | OUTPATIENT
Start: 2021-02-24 | End: 2021-02-25

## 2021-02-24 RX ORDER — LACTULOSE 10 G/15ML
20 SOLUTION ORAL EVERY 6 HOURS
Status: DISCONTINUED | OUTPATIENT
Start: 2021-02-24 | End: 2021-02-28

## 2021-02-24 RX ORDER — LACTULOSE 10 G/15ML
20 SOLUTION ORAL EVERY 6 HOURS
Status: DISCONTINUED | OUTPATIENT
Start: 2021-02-24 | End: 2021-02-24

## 2021-02-24 RX ADMIN — LOSARTAN POTASSIUM 50 MG: 50 TABLET, FILM COATED ORAL at 09:02

## 2021-02-24 RX ADMIN — ATORVASTATIN CALCIUM 40 MG: 20 TABLET, FILM COATED ORAL at 08:02

## 2021-02-24 RX ADMIN — ENOXAPARIN SODIUM 40 MG: 40 INJECTION SUBCUTANEOUS at 08:02

## 2021-02-24 RX ADMIN — ASPIRIN 81 MG: 81 TABLET, COATED ORAL at 08:02

## 2021-02-24 RX ADMIN — INSULIN ASPART 8 UNITS: 100 INJECTION, SOLUTION INTRAVENOUS; SUBCUTANEOUS at 04:02

## 2021-02-24 RX ADMIN — POLYETHYLENE GLYCOL 3350 17 G: 17 POWDER, FOR SOLUTION ORAL at 09:02

## 2021-02-24 RX ADMIN — CEFEPIME 1 G: 1 INJECTION, POWDER, FOR SOLUTION INTRAMUSCULAR; INTRAVENOUS at 04:02

## 2021-02-24 RX ADMIN — INSULIN ASPART 2 UNITS: 100 INJECTION, SOLUTION INTRAVENOUS; SUBCUTANEOUS at 04:02

## 2021-02-24 RX ADMIN — SENNOSIDES 8.6 MG: 8.6 TABLET, FILM COATED ORAL at 09:02

## 2021-02-24 RX ADMIN — INSULIN DETEMIR 50 UNITS: 100 INJECTION, SOLUTION SUBCUTANEOUS at 09:02

## 2021-02-24 RX ADMIN — LACTULOSE 20 G: 10 SOLUTION ORAL at 08:02

## 2021-02-24 RX ADMIN — INSULIN ASPART 3 UNITS: 100 INJECTION, SOLUTION INTRAVENOUS; SUBCUTANEOUS at 11:02

## 2021-02-24 RX ADMIN — INSULIN ASPART 5 UNITS: 100 INJECTION, SOLUTION INTRAVENOUS; SUBCUTANEOUS at 08:02

## 2021-02-24 RX ADMIN — LACTULOSE 20 G: 10 SOLUTION ORAL at 12:02

## 2021-02-24 RX ADMIN — SENNOSIDES 8.6 MG: 8.6 TABLET, FILM COATED ORAL at 08:02

## 2021-02-24 RX ADMIN — PANTOPRAZOLE SODIUM 40 MG: 40 TABLET, DELAYED RELEASE ORAL at 08:02

## 2021-02-24 RX ADMIN — HYDRALAZINE HYDROCHLORIDE AND ISOSORBIDE DINITRATE 2 TABLET: 37.5; 2 TABLET, FILM COATED ORAL at 09:02

## 2021-02-24 RX ADMIN — ENOXAPARIN SODIUM 40 MG: 40 INJECTION SUBCUTANEOUS at 09:02

## 2021-02-24 RX ADMIN — HYDRALAZINE HYDROCHLORIDE AND ISOSORBIDE DINITRATE 2 TABLET: 37.5; 2 TABLET, FILM COATED ORAL at 08:02

## 2021-02-24 RX ADMIN — VANCOMYCIN HYDROCHLORIDE 1000 MG: 1 INJECTION, POWDER, LYOPHILIZED, FOR SOLUTION INTRAVENOUS at 08:02

## 2021-02-24 RX ADMIN — HYDRALAZINE HYDROCHLORIDE 25 MG: 25 TABLET, FILM COATED ORAL at 08:02

## 2021-02-24 RX ADMIN — INSULIN ASPART 2 UNITS: 100 INJECTION, SOLUTION INTRAVENOUS; SUBCUTANEOUS at 08:02

## 2021-02-24 RX ADMIN — GABAPENTIN 200 MG: 100 CAPSULE ORAL at 08:02

## 2021-02-24 RX ADMIN — Medication 1 CAPSULE: at 08:02

## 2021-02-24 RX ADMIN — INSULIN ASPART 8 UNITS: 100 INJECTION, SOLUTION INTRAVENOUS; SUBCUTANEOUS at 11:02

## 2021-02-24 RX ADMIN — CEFEPIME 1 G: 1 INJECTION, POWDER, FOR SOLUTION INTRAMUSCULAR; INTRAVENOUS at 09:02

## 2021-02-24 RX ADMIN — POLYETHYLENE GLYCOL 3350 17 G: 17 POWDER, FOR SOLUTION ORAL at 08:02

## 2021-02-24 RX ADMIN — VANCOMYCIN HYDROCHLORIDE 1000 MG: 1 INJECTION, POWDER, LYOPHILIZED, FOR SOLUTION INTRAVENOUS at 09:02

## 2021-02-24 RX ADMIN — CEFEPIME 1 G: 1 INJECTION, POWDER, FOR SOLUTION INTRAMUSCULAR; INTRAVENOUS at 12:02

## 2021-02-24 RX ADMIN — LACTULOSE 20 G: 10 SOLUTION ORAL at 05:02

## 2021-02-24 RX ADMIN — LOSARTAN POTASSIUM 50 MG: 50 TABLET, FILM COATED ORAL at 08:02

## 2021-02-24 RX ADMIN — GABAPENTIN 200 MG: 100 CAPSULE ORAL at 09:02

## 2021-02-24 RX ADMIN — HYDRALAZINE HYDROCHLORIDE AND ISOSORBIDE DINITRATE 2 TABLET: 37.5; 2 TABLET, FILM COATED ORAL at 02:02

## 2021-02-25 LAB
ANION GAP SERPL CALC-SCNC: 8 MMOL/L (ref 8–16)
BACTERIA SPEC AEROBE CULT: ABNORMAL
BASOPHILS # BLD AUTO: 0.03 K/UL (ref 0–0.2)
BASOPHILS NFR BLD: 0.5 % (ref 0–1.9)
BUN SERPL-MCNC: 15 MG/DL (ref 8–23)
CALCIUM SERPL-MCNC: 8.4 MG/DL (ref 8.7–10.5)
CHLORIDE SERPL-SCNC: 98 MMOL/L (ref 95–110)
CO2 SERPL-SCNC: 27 MMOL/L (ref 23–29)
CREAT SERPL-MCNC: 1.1 MG/DL (ref 0.5–1.4)
DIFFERENTIAL METHOD: ABNORMAL
EOSINOPHIL # BLD AUTO: 0.3 K/UL (ref 0–0.5)
EOSINOPHIL NFR BLD: 5 % (ref 0–8)
ERYTHROCYTE [DISTWIDTH] IN BLOOD BY AUTOMATED COUNT: 17.9 % (ref 11.5–14.5)
EST. GFR  (AFRICAN AMERICAN): >60 ML/MIN/1.73 M^2
EST. GFR  (NON AFRICAN AMERICAN): >60 ML/MIN/1.73 M^2
GLUCOSE SERPL-MCNC: 208 MG/DL (ref 70–110)
HCT VFR BLD AUTO: 27.8 % (ref 40–54)
HGB BLD-MCNC: 8.5 G/DL (ref 14–18)
IMM GRANULOCYTES # BLD AUTO: 0.02 K/UL (ref 0–0.04)
IMM GRANULOCYTES NFR BLD AUTO: 0.3 % (ref 0–0.5)
LYMPHOCYTES # BLD AUTO: 1.5 K/UL (ref 1–4.8)
LYMPHOCYTES NFR BLD: 23.2 % (ref 18–48)
MCH RBC QN AUTO: 25.5 PG (ref 27–31)
MCHC RBC AUTO-ENTMCNC: 30.6 G/DL (ref 32–36)
MCV RBC AUTO: 84 FL (ref 82–98)
MONOCYTES # BLD AUTO: 0.7 K/UL (ref 0.3–1)
MONOCYTES NFR BLD: 10.6 % (ref 4–15)
NEUTROPHILS # BLD AUTO: 3.8 K/UL (ref 1.8–7.7)
NEUTROPHILS NFR BLD: 60.4 % (ref 38–73)
NRBC BLD-RTO: 0 /100 WBC
PLATELET # BLD AUTO: 423 K/UL (ref 150–350)
PMV BLD AUTO: 9.7 FL (ref 9.2–12.9)
POCT GLUCOSE: 187 MG/DL (ref 70–110)
POCT GLUCOSE: 196 MG/DL (ref 70–110)
POCT GLUCOSE: 198 MG/DL (ref 70–110)
POCT GLUCOSE: 209 MG/DL (ref 70–110)
POTASSIUM SERPL-SCNC: 3.9 MMOL/L (ref 3.5–5.1)
RBC # BLD AUTO: 3.33 M/UL (ref 4.6–6.2)
SODIUM SERPL-SCNC: 133 MMOL/L (ref 136–145)
WBC # BLD AUTO: 6.24 K/UL (ref 3.9–12.7)

## 2021-02-25 PROCEDURE — 25000003 PHARM REV CODE 250: Performed by: PHYSICIAN ASSISTANT

## 2021-02-25 PROCEDURE — C9399 UNCLASSIFIED DRUGS OR BIOLOG: HCPCS | Performed by: PHYSICIAN ASSISTANT

## 2021-02-25 PROCEDURE — 63600175 PHARM REV CODE 636 W HCPCS: Performed by: PHYSICIAN ASSISTANT

## 2021-02-25 PROCEDURE — 25000003 PHARM REV CODE 250: Performed by: INTERNAL MEDICINE

## 2021-02-25 PROCEDURE — 99233 SBSQ HOSP IP/OBS HIGH 50: CPT | Mod: ,,, | Performed by: PHYSICIAN ASSISTANT

## 2021-02-25 PROCEDURE — 97530 THERAPEUTIC ACTIVITIES: CPT | Mod: CQ

## 2021-02-25 PROCEDURE — 63600175 PHARM REV CODE 636 W HCPCS: Performed by: INTERNAL MEDICINE

## 2021-02-25 PROCEDURE — 99233 PR SUBSEQUENT HOSPITAL CARE,LEVL III: ICD-10-PCS | Mod: ,,, | Performed by: PHYSICIAN ASSISTANT

## 2021-02-25 PROCEDURE — 11000001 HC ACUTE MED/SURG PRIVATE ROOM

## 2021-02-25 PROCEDURE — 85025 COMPLETE CBC W/AUTO DIFF WBC: CPT

## 2021-02-25 PROCEDURE — 36415 COLL VENOUS BLD VENIPUNCTURE: CPT

## 2021-02-25 PROCEDURE — 80048 BASIC METABOLIC PNL TOTAL CA: CPT

## 2021-02-25 PROCEDURE — 97110 THERAPEUTIC EXERCISES: CPT | Mod: CQ

## 2021-02-25 RX ORDER — METRONIDAZOLE 500 MG/1
500 TABLET ORAL EVERY 8 HOURS
Status: DISCONTINUED | OUTPATIENT
Start: 2021-02-25 | End: 2021-03-03 | Stop reason: HOSPADM

## 2021-02-25 RX ORDER — VANCOMYCIN HCL IN 5 % DEXTROSE 1G/250ML
1000 PLASTIC BAG, INJECTION (ML) INTRAVENOUS
Status: DISCONTINUED | OUTPATIENT
Start: 2021-02-25 | End: 2021-02-26

## 2021-02-25 RX ORDER — INSULIN ASPART 100 [IU]/ML
10 INJECTION, SOLUTION INTRAVENOUS; SUBCUTANEOUS
Status: DISCONTINUED | OUTPATIENT
Start: 2021-02-25 | End: 2021-02-27

## 2021-02-25 RX ADMIN — INSULIN ASPART 8 UNITS: 100 INJECTION, SOLUTION INTRAVENOUS; SUBCUTANEOUS at 08:02

## 2021-02-25 RX ADMIN — INSULIN ASPART 10 UNITS: 100 INJECTION, SOLUTION INTRAVENOUS; SUBCUTANEOUS at 05:02

## 2021-02-25 RX ADMIN — ASPIRIN 81 MG: 81 TABLET, COATED ORAL at 08:02

## 2021-02-25 RX ADMIN — LOSARTAN POTASSIUM 50 MG: 50 TABLET, FILM COATED ORAL at 08:02

## 2021-02-25 RX ADMIN — ENOXAPARIN SODIUM 40 MG: 40 INJECTION SUBCUTANEOUS at 08:02

## 2021-02-25 RX ADMIN — SENNOSIDES 8.6 MG: 8.6 TABLET, FILM COATED ORAL at 09:02

## 2021-02-25 RX ADMIN — METRONIDAZOLE 500 MG: 500 TABLET ORAL at 02:02

## 2021-02-25 RX ADMIN — CEFEPIME 1 G: 1 INJECTION, POWDER, FOR SOLUTION INTRAMUSCULAR; INTRAVENOUS at 12:02

## 2021-02-25 RX ADMIN — METRONIDAZOLE 500 MG: 500 TABLET ORAL at 09:02

## 2021-02-25 RX ADMIN — VANCOMYCIN HYDROCHLORIDE 1000 MG: 1 INJECTION, POWDER, LYOPHILIZED, FOR SOLUTION INTRAVENOUS at 08:02

## 2021-02-25 RX ADMIN — Medication 1 CAPSULE: at 08:02

## 2021-02-25 RX ADMIN — CEFEPIME 1 G: 1 INJECTION, POWDER, FOR SOLUTION INTRAMUSCULAR; INTRAVENOUS at 09:02

## 2021-02-25 RX ADMIN — INSULIN ASPART 2 UNITS: 100 INJECTION, SOLUTION INTRAVENOUS; SUBCUTANEOUS at 05:02

## 2021-02-25 RX ADMIN — ENOXAPARIN SODIUM 40 MG: 40 INJECTION SUBCUTANEOUS at 09:02

## 2021-02-25 RX ADMIN — HYDRALAZINE HYDROCHLORIDE AND ISOSORBIDE DINITRATE 2 TABLET: 37.5; 2 TABLET, FILM COATED ORAL at 02:02

## 2021-02-25 RX ADMIN — CEFEPIME 1 G: 1 INJECTION, POWDER, FOR SOLUTION INTRAMUSCULAR; INTRAVENOUS at 04:02

## 2021-02-25 RX ADMIN — HYDRALAZINE HYDROCHLORIDE AND ISOSORBIDE DINITRATE 2 TABLET: 37.5; 2 TABLET, FILM COATED ORAL at 08:02

## 2021-02-25 RX ADMIN — HYDRALAZINE HYDROCHLORIDE AND ISOSORBIDE DINITRATE 2 TABLET: 37.5; 2 TABLET, FILM COATED ORAL at 09:02

## 2021-02-25 RX ADMIN — INSULIN DETEMIR 55 UNITS: 100 INJECTION, SOLUTION SUBCUTANEOUS at 09:02

## 2021-02-25 RX ADMIN — LACTULOSE 20 G: 10 SOLUTION ORAL at 12:02

## 2021-02-25 RX ADMIN — LOSARTAN POTASSIUM 50 MG: 50 TABLET, FILM COATED ORAL at 09:02

## 2021-02-25 RX ADMIN — GABAPENTIN 200 MG: 100 CAPSULE ORAL at 09:02

## 2021-02-25 RX ADMIN — INSULIN ASPART 8 UNITS: 100 INJECTION, SOLUTION INTRAVENOUS; SUBCUTANEOUS at 12:02

## 2021-02-25 RX ADMIN — GABAPENTIN 200 MG: 100 CAPSULE ORAL at 08:02

## 2021-02-25 RX ADMIN — PANTOPRAZOLE SODIUM 40 MG: 40 TABLET, DELAYED RELEASE ORAL at 08:02

## 2021-02-25 RX ADMIN — ATORVASTATIN CALCIUM 40 MG: 20 TABLET, FILM COATED ORAL at 08:02

## 2021-02-26 PROBLEM — M86.9 OSTEOMYELITIS OF LEFT FOOT: Status: RESOLVED | Noted: 2021-02-18 | Resolved: 2021-02-26

## 2021-02-26 LAB
ANION GAP SERPL CALC-SCNC: 10 MMOL/L (ref 8–16)
BUN SERPL-MCNC: 17 MG/DL (ref 8–23)
CALCIUM SERPL-MCNC: 8.1 MG/DL (ref 8.7–10.5)
CHLORIDE SERPL-SCNC: 104 MMOL/L (ref 95–110)
CO2 SERPL-SCNC: 25 MMOL/L (ref 23–29)
CREAT SERPL-MCNC: 1.2 MG/DL (ref 0.5–1.4)
CRP SERPL-MCNC: 23.2 MG/L (ref 0–8.2)
ERYTHROCYTE [SEDIMENTATION RATE] IN BLOOD BY WESTERGREN METHOD: >120 MM/HR (ref 0–23)
EST. GFR  (AFRICAN AMERICAN): >60 ML/MIN/1.73 M^2
EST. GFR  (NON AFRICAN AMERICAN): >60 ML/MIN/1.73 M^2
GLUCOSE SERPL-MCNC: 170 MG/DL (ref 70–110)
POCT GLUCOSE: 159 MG/DL (ref 70–110)
POCT GLUCOSE: 176 MG/DL (ref 70–110)
POCT GLUCOSE: 181 MG/DL (ref 70–110)
POCT GLUCOSE: 224 MG/DL (ref 70–110)
POTASSIUM SERPL-SCNC: 4.4 MMOL/L (ref 3.5–5.1)
SODIUM SERPL-SCNC: 139 MMOL/L (ref 136–145)

## 2021-02-26 PROCEDURE — 99233 SBSQ HOSP IP/OBS HIGH 50: CPT | Mod: ,,, | Performed by: PHYSICIAN ASSISTANT

## 2021-02-26 PROCEDURE — 11000001 HC ACUTE MED/SURG PRIVATE ROOM

## 2021-02-26 PROCEDURE — 63600175 PHARM REV CODE 636 W HCPCS: Performed by: PHYSICIAN ASSISTANT

## 2021-02-26 PROCEDURE — 63600175 PHARM REV CODE 636 W HCPCS: Performed by: INTERNAL MEDICINE

## 2021-02-26 PROCEDURE — 25000003 PHARM REV CODE 250: Performed by: PHYSICIAN ASSISTANT

## 2021-02-26 PROCEDURE — 97530 THERAPEUTIC ACTIVITIES: CPT | Mod: CQ

## 2021-02-26 PROCEDURE — 86140 C-REACTIVE PROTEIN: CPT

## 2021-02-26 PROCEDURE — 99233 PR SUBSEQUENT HOSPITAL CARE,LEVL III: ICD-10-PCS | Mod: ,,, | Performed by: PHYSICIAN ASSISTANT

## 2021-02-26 PROCEDURE — 25000003 PHARM REV CODE 250: Performed by: INTERNAL MEDICINE

## 2021-02-26 PROCEDURE — 80048 BASIC METABOLIC PNL TOTAL CA: CPT

## 2021-02-26 PROCEDURE — 85652 RBC SED RATE AUTOMATED: CPT

## 2021-02-26 PROCEDURE — 97530 THERAPEUTIC ACTIVITIES: CPT

## 2021-02-26 PROCEDURE — 36415 COLL VENOUS BLD VENIPUNCTURE: CPT

## 2021-02-26 RX ADMIN — LOSARTAN POTASSIUM 50 MG: 50 TABLET, FILM COATED ORAL at 09:02

## 2021-02-26 RX ADMIN — Medication 1 CAPSULE: at 09:02

## 2021-02-26 RX ADMIN — ASPIRIN 81 MG: 81 TABLET, COATED ORAL at 09:02

## 2021-02-26 RX ADMIN — HYDRALAZINE HYDROCHLORIDE AND ISOSORBIDE DINITRATE 2 TABLET: 37.5; 2 TABLET, FILM COATED ORAL at 09:02

## 2021-02-26 RX ADMIN — ENOXAPARIN SODIUM 40 MG: 40 INJECTION SUBCUTANEOUS at 09:02

## 2021-02-26 RX ADMIN — INSULIN DETEMIR 55 UNITS: 100 INJECTION, SOLUTION SUBCUTANEOUS at 09:02

## 2021-02-26 RX ADMIN — PANTOPRAZOLE SODIUM 40 MG: 40 TABLET, DELAYED RELEASE ORAL at 09:02

## 2021-02-26 RX ADMIN — GABAPENTIN 200 MG: 100 CAPSULE ORAL at 09:02

## 2021-02-26 RX ADMIN — VANCOMYCIN HYDROCHLORIDE 1000 MG: 1 INJECTION, POWDER, LYOPHILIZED, FOR SOLUTION INTRAVENOUS at 12:02

## 2021-02-26 RX ADMIN — CEFEPIME 1 G: 1 INJECTION, POWDER, FOR SOLUTION INTRAMUSCULAR; INTRAVENOUS at 05:02

## 2021-02-26 RX ADMIN — HYDRALAZINE HYDROCHLORIDE AND ISOSORBIDE DINITRATE 2 TABLET: 37.5; 2 TABLET, FILM COATED ORAL at 04:02

## 2021-02-26 RX ADMIN — POLYETHYLENE GLYCOL 3350 17 G: 17 POWDER, FOR SOLUTION ORAL at 09:02

## 2021-02-26 RX ADMIN — INSULIN ASPART 10 UNITS: 100 INJECTION, SOLUTION INTRAVENOUS; SUBCUTANEOUS at 09:02

## 2021-02-26 RX ADMIN — CEFTRIAXONE SODIUM 2 G: 2 INJECTION, POWDER, FOR SOLUTION INTRAMUSCULAR; INTRAVENOUS at 09:02

## 2021-02-26 RX ADMIN — LACTULOSE 20 G: 10 SOLUTION ORAL at 06:02

## 2021-02-26 RX ADMIN — SENNOSIDES 8.6 MG: 8.6 TABLET, FILM COATED ORAL at 09:02

## 2021-02-26 RX ADMIN — LACTULOSE 20 G: 10 SOLUTION ORAL at 01:02

## 2021-02-26 RX ADMIN — LACTULOSE 20 G: 10 SOLUTION ORAL at 12:02

## 2021-02-26 RX ADMIN — CEFEPIME 1 G: 1 INJECTION, POWDER, FOR SOLUTION INTRAMUSCULAR; INTRAVENOUS at 01:02

## 2021-02-26 RX ADMIN — INSULIN ASPART 10 UNITS: 100 INJECTION, SOLUTION INTRAVENOUS; SUBCUTANEOUS at 01:02

## 2021-02-26 RX ADMIN — VANCOMYCIN HYDROCHLORIDE 1000 MG: 1 INJECTION, POWDER, LYOPHILIZED, FOR SOLUTION INTRAVENOUS at 01:02

## 2021-02-26 RX ADMIN — INSULIN ASPART 2 UNITS: 100 INJECTION, SOLUTION INTRAVENOUS; SUBCUTANEOUS at 01:02

## 2021-02-26 RX ADMIN — ATORVASTATIN CALCIUM 40 MG: 20 TABLET, FILM COATED ORAL at 09:02

## 2021-02-26 RX ADMIN — METRONIDAZOLE 500 MG: 500 TABLET ORAL at 01:02

## 2021-02-26 RX ADMIN — METRONIDAZOLE 500 MG: 500 TABLET ORAL at 09:02

## 2021-02-26 RX ADMIN — METRONIDAZOLE 500 MG: 500 TABLET ORAL at 05:02

## 2021-02-26 RX ADMIN — LACTULOSE 20 G: 10 SOLUTION ORAL at 05:02

## 2021-02-26 RX ADMIN — INSULIN ASPART 10 UNITS: 100 INJECTION, SOLUTION INTRAVENOUS; SUBCUTANEOUS at 04:02

## 2021-02-27 LAB
ANION GAP SERPL CALC-SCNC: 6 MMOL/L (ref 8–16)
BUN SERPL-MCNC: 15 MG/DL (ref 8–23)
CALCIUM SERPL-MCNC: 8.4 MG/DL (ref 8.7–10.5)
CHLORIDE SERPL-SCNC: 104 MMOL/L (ref 95–110)
CO2 SERPL-SCNC: 28 MMOL/L (ref 23–29)
CREAT SERPL-MCNC: 1.2 MG/DL (ref 0.5–1.4)
EST. GFR  (AFRICAN AMERICAN): >60 ML/MIN/1.73 M^2
EST. GFR  (NON AFRICAN AMERICAN): >60 ML/MIN/1.73 M^2
GLUCOSE SERPL-MCNC: 176 MG/DL (ref 70–110)
POCT GLUCOSE: 104 MG/DL (ref 70–110)
POCT GLUCOSE: 115 MG/DL (ref 70–110)
POCT GLUCOSE: 144 MG/DL (ref 70–110)
POCT GLUCOSE: 167 MG/DL (ref 70–110)
POTASSIUM SERPL-SCNC: 4.5 MMOL/L (ref 3.5–5.1)
SODIUM SERPL-SCNC: 138 MMOL/L (ref 136–145)

## 2021-02-27 PROCEDURE — 80048 BASIC METABOLIC PNL TOTAL CA: CPT

## 2021-02-27 PROCEDURE — 99233 PR SUBSEQUENT HOSPITAL CARE,LEVL III: ICD-10-PCS | Mod: ,,, | Performed by: PHYSICIAN ASSISTANT

## 2021-02-27 PROCEDURE — 63600175 PHARM REV CODE 636 W HCPCS: Performed by: PHYSICIAN ASSISTANT

## 2021-02-27 PROCEDURE — 36415 COLL VENOUS BLD VENIPUNCTURE: CPT

## 2021-02-27 PROCEDURE — 11000001 HC ACUTE MED/SURG PRIVATE ROOM

## 2021-02-27 PROCEDURE — 25000003 PHARM REV CODE 250: Performed by: PHYSICIAN ASSISTANT

## 2021-02-27 PROCEDURE — 25000003 PHARM REV CODE 250: Performed by: INTERNAL MEDICINE

## 2021-02-27 PROCEDURE — 99233 SBSQ HOSP IP/OBS HIGH 50: CPT | Mod: ,,, | Performed by: PHYSICIAN ASSISTANT

## 2021-02-27 RX ORDER — INSULIN ASPART 100 [IU]/ML
12 INJECTION, SOLUTION INTRAVENOUS; SUBCUTANEOUS
Status: DISCONTINUED | OUTPATIENT
Start: 2021-02-27 | End: 2021-03-03 | Stop reason: HOSPADM

## 2021-02-27 RX ADMIN — PANTOPRAZOLE SODIUM 40 MG: 40 TABLET, DELAYED RELEASE ORAL at 11:02

## 2021-02-27 RX ADMIN — GABAPENTIN 200 MG: 100 CAPSULE ORAL at 11:02

## 2021-02-27 RX ADMIN — ATORVASTATIN CALCIUM 40 MG: 20 TABLET, FILM COATED ORAL at 12:02

## 2021-02-27 RX ADMIN — ENOXAPARIN SODIUM 40 MG: 40 INJECTION SUBCUTANEOUS at 09:02

## 2021-02-27 RX ADMIN — GABAPENTIN 200 MG: 100 CAPSULE ORAL at 09:02

## 2021-02-27 RX ADMIN — INSULIN ASPART 10 UNITS: 100 INJECTION, SOLUTION INTRAVENOUS; SUBCUTANEOUS at 12:02

## 2021-02-27 RX ADMIN — METRONIDAZOLE 500 MG: 500 TABLET ORAL at 09:02

## 2021-02-27 RX ADMIN — Medication 1 CAPSULE: at 11:02

## 2021-02-27 RX ADMIN — LOSARTAN POTASSIUM 50 MG: 50 TABLET, FILM COATED ORAL at 11:02

## 2021-02-27 RX ADMIN — SENNOSIDES 8.6 MG: 8.6 TABLET, FILM COATED ORAL at 09:02

## 2021-02-27 RX ADMIN — HYDRALAZINE HYDROCHLORIDE AND ISOSORBIDE DINITRATE 2 TABLET: 37.5; 2 TABLET, FILM COATED ORAL at 11:02

## 2021-02-27 RX ADMIN — LACTULOSE 20 G: 10 SOLUTION ORAL at 05:02

## 2021-02-27 RX ADMIN — CEFTRIAXONE SODIUM 2 G: 2 INJECTION, POWDER, FOR SOLUTION INTRAMUSCULAR; INTRAVENOUS at 09:02

## 2021-02-27 RX ADMIN — HYDRALAZINE HYDROCHLORIDE AND ISOSORBIDE DINITRATE 2 TABLET: 37.5; 2 TABLET, FILM COATED ORAL at 04:02

## 2021-02-27 RX ADMIN — ENOXAPARIN SODIUM 40 MG: 40 INJECTION SUBCUTANEOUS at 11:02

## 2021-02-27 RX ADMIN — LACTULOSE 20 G: 10 SOLUTION ORAL at 12:02

## 2021-02-27 RX ADMIN — LOSARTAN POTASSIUM 50 MG: 50 TABLET, FILM COATED ORAL at 09:02

## 2021-02-27 RX ADMIN — METRONIDAZOLE 500 MG: 500 TABLET ORAL at 04:02

## 2021-02-27 RX ADMIN — METRONIDAZOLE 500 MG: 500 TABLET ORAL at 05:02

## 2021-02-27 RX ADMIN — POLYETHYLENE GLYCOL 3350 17 G: 17 POWDER, FOR SOLUTION ORAL at 09:02

## 2021-02-27 RX ADMIN — HYDRALAZINE HYDROCHLORIDE AND ISOSORBIDE DINITRATE 2 TABLET: 37.5; 2 TABLET, FILM COATED ORAL at 09:02

## 2021-02-27 RX ADMIN — INSULIN DETEMIR 55 UNITS: 100 INJECTION, SOLUTION SUBCUTANEOUS at 09:02

## 2021-02-27 RX ADMIN — INSULIN ASPART 12 UNITS: 100 INJECTION, SOLUTION INTRAVENOUS; SUBCUTANEOUS at 05:02

## 2021-02-27 RX ADMIN — INSULIN ASPART 10 UNITS: 100 INJECTION, SOLUTION INTRAVENOUS; SUBCUTANEOUS at 08:02

## 2021-02-27 RX ADMIN — ASPIRIN 81 MG: 81 TABLET, COATED ORAL at 11:02

## 2021-02-28 LAB
ANION GAP SERPL CALC-SCNC: 7 MMOL/L (ref 8–16)
BASOPHILS # BLD AUTO: 0.04 K/UL (ref 0–0.2)
BASOPHILS NFR BLD: 0.7 % (ref 0–1.9)
BUN SERPL-MCNC: 15 MG/DL (ref 8–23)
CALCIUM SERPL-MCNC: 8.2 MG/DL (ref 8.7–10.5)
CHLORIDE SERPL-SCNC: 104 MMOL/L (ref 95–110)
CO2 SERPL-SCNC: 24 MMOL/L (ref 23–29)
CREAT SERPL-MCNC: 1.1 MG/DL (ref 0.5–1.4)
DIFFERENTIAL METHOD: ABNORMAL
EOSINOPHIL # BLD AUTO: 0.4 K/UL (ref 0–0.5)
EOSINOPHIL NFR BLD: 7.2 % (ref 0–8)
ERYTHROCYTE [DISTWIDTH] IN BLOOD BY AUTOMATED COUNT: 18.6 % (ref 11.5–14.5)
EST. GFR  (AFRICAN AMERICAN): >60 ML/MIN/1.73 M^2
EST. GFR  (NON AFRICAN AMERICAN): >60 ML/MIN/1.73 M^2
GLUCOSE SERPL-MCNC: 175 MG/DL (ref 70–110)
HCT VFR BLD AUTO: 30.4 % (ref 40–54)
HGB BLD-MCNC: 9.2 G/DL (ref 14–18)
IMM GRANULOCYTES # BLD AUTO: 0.01 K/UL (ref 0–0.04)
IMM GRANULOCYTES NFR BLD AUTO: 0.2 % (ref 0–0.5)
LYMPHOCYTES # BLD AUTO: 1.5 K/UL (ref 1–4.8)
LYMPHOCYTES NFR BLD: 24.3 % (ref 18–48)
MCH RBC QN AUTO: 25.6 PG (ref 27–31)
MCHC RBC AUTO-ENTMCNC: 30.3 G/DL (ref 32–36)
MCV RBC AUTO: 85 FL (ref 82–98)
MONOCYTES # BLD AUTO: 0.6 K/UL (ref 0.3–1)
MONOCYTES NFR BLD: 10.2 % (ref 4–15)
NEUTROPHILS # BLD AUTO: 3.5 K/UL (ref 1.8–7.7)
NEUTROPHILS NFR BLD: 57.4 % (ref 38–73)
NRBC BLD-RTO: 0 /100 WBC
PLATELET # BLD AUTO: 332 K/UL (ref 150–350)
PMV BLD AUTO: 10.3 FL (ref 9.2–12.9)
POCT GLUCOSE: 170 MG/DL (ref 70–110)
POCT GLUCOSE: 171 MG/DL (ref 70–110)
POCT GLUCOSE: 177 MG/DL (ref 70–110)
POCT GLUCOSE: 188 MG/DL (ref 70–110)
POTASSIUM SERPL-SCNC: 4.4 MMOL/L (ref 3.5–5.1)
RBC # BLD AUTO: 3.59 M/UL (ref 4.6–6.2)
SODIUM SERPL-SCNC: 135 MMOL/L (ref 136–145)
WBC # BLD AUTO: 6 K/UL (ref 3.9–12.7)

## 2021-02-28 PROCEDURE — 63600175 PHARM REV CODE 636 W HCPCS: Performed by: PHYSICIAN ASSISTANT

## 2021-02-28 PROCEDURE — 99232 PR SUBSEQUENT HOSPITAL CARE,LEVL II: ICD-10-PCS | Mod: ,,, | Performed by: INTERNAL MEDICINE

## 2021-02-28 PROCEDURE — 25000003 PHARM REV CODE 250: Performed by: PHYSICIAN ASSISTANT

## 2021-02-28 PROCEDURE — 11000001 HC ACUTE MED/SURG PRIVATE ROOM

## 2021-02-28 PROCEDURE — 36415 COLL VENOUS BLD VENIPUNCTURE: CPT

## 2021-02-28 PROCEDURE — 85025 COMPLETE CBC W/AUTO DIFF WBC: CPT

## 2021-02-28 PROCEDURE — 25000003 PHARM REV CODE 250: Performed by: INTERNAL MEDICINE

## 2021-02-28 PROCEDURE — 99232 SBSQ HOSP IP/OBS MODERATE 35: CPT | Mod: ,,, | Performed by: INTERNAL MEDICINE

## 2021-02-28 PROCEDURE — 80048 BASIC METABOLIC PNL TOTAL CA: CPT

## 2021-02-28 RX ORDER — METRONIDAZOLE 500 MG/1
500 TABLET ORAL EVERY 8 HOURS
Start: 2021-02-28 | End: 2022-06-15 | Stop reason: CLARIF

## 2021-02-28 RX ORDER — AMLODIPINE BESYLATE 5 MG/1
5 TABLET ORAL DAILY
Qty: 30 TABLET | Refills: 11
Start: 2021-02-28 | End: 2021-06-09

## 2021-02-28 RX ORDER — LACTULOSE 10 G/15ML
20 SOLUTION ORAL 3 TIMES DAILY
Status: DISCONTINUED | OUTPATIENT
Start: 2021-02-28 | End: 2021-03-03 | Stop reason: HOSPADM

## 2021-02-28 RX ORDER — TALC
6 POWDER (GRAM) TOPICAL NIGHTLY PRN
Refills: 0 | Status: ON HOLD
Start: 2021-02-28 | End: 2022-06-20

## 2021-02-28 RX ORDER — POLYETHYLENE GLYCOL 3350 17 G/17G
17 POWDER, FOR SOLUTION ORAL DAILY
Refills: 0 | Status: ON HOLD
Start: 2021-02-28 | End: 2022-06-20

## 2021-02-28 RX ORDER — INSULIN ASPART 100 [IU]/ML
0-5 INJECTION, SOLUTION INTRAVENOUS; SUBCUTANEOUS
Refills: 0 | Status: ON HOLD
Start: 2021-02-28 | End: 2022-06-20

## 2021-02-28 RX ORDER — SENNOSIDES 8.6 MG/1
1 TABLET ORAL 2 TIMES DAILY
Status: ON HOLD
Start: 2021-02-28 | End: 2022-06-20

## 2021-02-28 RX ORDER — AMLODIPINE BESYLATE 5 MG/1
5 TABLET ORAL DAILY
Status: DISCONTINUED | OUTPATIENT
Start: 2021-02-28 | End: 2021-03-03 | Stop reason: HOSPADM

## 2021-02-28 RX ORDER — LACTULOSE 10 G/15ML
20 SOLUTION ORAL 3 TIMES DAILY
Qty: 900 ML | Refills: 0
Start: 2021-02-28 | End: 2021-03-10

## 2021-02-28 RX ORDER — INSULIN ASPART 100 [IU]/ML
12 INJECTION, SOLUTION INTRAVENOUS; SUBCUTANEOUS 3 TIMES DAILY
Refills: 0
Start: 2021-02-28 | End: 2022-02-28

## 2021-02-28 RX ADMIN — HYDRALAZINE HYDROCHLORIDE AND ISOSORBIDE DINITRATE 2 TABLET: 37.5; 2 TABLET, FILM COATED ORAL at 08:02

## 2021-02-28 RX ADMIN — GABAPENTIN 200 MG: 100 CAPSULE ORAL at 09:02

## 2021-02-28 RX ADMIN — HYDRALAZINE HYDROCHLORIDE AND ISOSORBIDE DINITRATE 2 TABLET: 37.5; 2 TABLET, FILM COATED ORAL at 03:02

## 2021-02-28 RX ADMIN — METRONIDAZOLE 500 MG: 500 TABLET ORAL at 06:02

## 2021-02-28 RX ADMIN — ENOXAPARIN SODIUM 40 MG: 40 INJECTION SUBCUTANEOUS at 08:02

## 2021-02-28 RX ADMIN — METRONIDAZOLE 500 MG: 500 TABLET ORAL at 03:02

## 2021-02-28 RX ADMIN — INSULIN ASPART 12 UNITS: 100 INJECTION, SOLUTION INTRAVENOUS; SUBCUTANEOUS at 05:02

## 2021-02-28 RX ADMIN — ENOXAPARIN SODIUM 40 MG: 40 INJECTION SUBCUTANEOUS at 09:02

## 2021-02-28 RX ADMIN — METRONIDAZOLE 500 MG: 500 TABLET ORAL at 10:02

## 2021-02-28 RX ADMIN — HYDRALAZINE HYDROCHLORIDE AND ISOSORBIDE DINITRATE 2 TABLET: 37.5; 2 TABLET, FILM COATED ORAL at 09:02

## 2021-02-28 RX ADMIN — PANTOPRAZOLE SODIUM 40 MG: 40 TABLET, DELAYED RELEASE ORAL at 09:02

## 2021-02-28 RX ADMIN — SENNOSIDES 8.6 MG: 8.6 TABLET, FILM COATED ORAL at 08:02

## 2021-02-28 RX ADMIN — ATORVASTATIN CALCIUM 40 MG: 20 TABLET, FILM COATED ORAL at 09:02

## 2021-02-28 RX ADMIN — LOSARTAN POTASSIUM 50 MG: 50 TABLET, FILM COATED ORAL at 09:02

## 2021-02-28 RX ADMIN — ASPIRIN 81 MG: 81 TABLET, COATED ORAL at 09:02

## 2021-02-28 RX ADMIN — INSULIN ASPART 12 UNITS: 100 INJECTION, SOLUTION INTRAVENOUS; SUBCUTANEOUS at 12:02

## 2021-02-28 RX ADMIN — POLYETHYLENE GLYCOL 3350 17 G: 17 POWDER, FOR SOLUTION ORAL at 08:02

## 2021-02-28 RX ADMIN — GABAPENTIN 200 MG: 100 CAPSULE ORAL at 08:02

## 2021-02-28 RX ADMIN — CEFTRIAXONE SODIUM 2 G: 2 INJECTION, POWDER, FOR SOLUTION INTRAMUSCULAR; INTRAVENOUS at 08:02

## 2021-02-28 RX ADMIN — LOSARTAN POTASSIUM 50 MG: 50 TABLET, FILM COATED ORAL at 08:02

## 2021-02-28 RX ADMIN — AMLODIPINE BESYLATE 5 MG: 5 TABLET ORAL at 05:02

## 2021-02-28 RX ADMIN — INSULIN DETEMIR 55 UNITS: 100 INJECTION, SOLUTION SUBCUTANEOUS at 08:02

## 2021-02-28 RX ADMIN — INSULIN ASPART 12 UNITS: 100 INJECTION, SOLUTION INTRAVENOUS; SUBCUTANEOUS at 07:02

## 2021-02-28 RX ADMIN — LACTULOSE 20 G: 10 SOLUTION ORAL at 08:02

## 2021-02-28 RX ADMIN — Medication 1 CAPSULE: at 09:02

## 2021-03-01 LAB
ANION GAP SERPL CALC-SCNC: 10 MMOL/L (ref 8–16)
ANISOCYTOSIS BLD QL SMEAR: SLIGHT
BACTERIA SPEC ANAEROBE CULT: NORMAL
BASOPHILS # BLD AUTO: 0.03 K/UL (ref 0–0.2)
BASOPHILS NFR BLD: 0.5 % (ref 0–1.9)
BUN SERPL-MCNC: 14 MG/DL (ref 8–23)
BURR CELLS BLD QL SMEAR: ABNORMAL
CALCIUM SERPL-MCNC: 9.1 MG/DL (ref 8.7–10.5)
CHLORIDE SERPL-SCNC: 103 MMOL/L (ref 95–110)
CO2 SERPL-SCNC: 24 MMOL/L (ref 23–29)
CREAT SERPL-MCNC: 1.1 MG/DL (ref 0.5–1.4)
CRP SERPL-MCNC: 15.7 MG/L (ref 0–8.2)
DIFFERENTIAL METHOD: ABNORMAL
EOSINOPHIL # BLD AUTO: 0.4 K/UL (ref 0–0.5)
EOSINOPHIL NFR BLD: 6.8 % (ref 0–8)
ERYTHROCYTE [DISTWIDTH] IN BLOOD BY AUTOMATED COUNT: 18.7 % (ref 11.5–14.5)
ERYTHROCYTE [SEDIMENTATION RATE] IN BLOOD BY WESTERGREN METHOD: >120 MM/HR (ref 0–23)
EST. GFR  (AFRICAN AMERICAN): >60 ML/MIN/1.73 M^2
EST. GFR  (NON AFRICAN AMERICAN): >60 ML/MIN/1.73 M^2
GLUCOSE SERPL-MCNC: 150 MG/DL (ref 70–110)
HCT VFR BLD AUTO: 34.3 % (ref 40–54)
HGB BLD-MCNC: 10.2 G/DL (ref 14–18)
IMM GRANULOCYTES # BLD AUTO: 0.01 K/UL (ref 0–0.04)
IMM GRANULOCYTES NFR BLD AUTO: 0.2 % (ref 0–0.5)
LYMPHOCYTES # BLD AUTO: 1.6 K/UL (ref 1–4.8)
LYMPHOCYTES NFR BLD: 24.6 % (ref 18–48)
MCH RBC QN AUTO: 25.6 PG (ref 27–31)
MCHC RBC AUTO-ENTMCNC: 29.7 G/DL (ref 32–36)
MCV RBC AUTO: 86 FL (ref 82–98)
MONOCYTES # BLD AUTO: 0.6 K/UL (ref 0.3–1)
MONOCYTES NFR BLD: 10.1 % (ref 4–15)
NEUTROPHILS # BLD AUTO: 3.7 K/UL (ref 1.8–7.7)
NEUTROPHILS NFR BLD: 57.8 % (ref 38–73)
NRBC BLD-RTO: 0 /100 WBC
OVALOCYTES BLD QL SMEAR: ABNORMAL
PLATELET # BLD AUTO: 394 K/UL (ref 150–350)
PMV BLD AUTO: 10.2 FL (ref 9.2–12.9)
POCT GLUCOSE: 135 MG/DL (ref 70–110)
POCT GLUCOSE: 145 MG/DL (ref 70–110)
POCT GLUCOSE: 152 MG/DL (ref 70–110)
POCT GLUCOSE: 163 MG/DL (ref 70–110)
POCT GLUCOSE: 174 MG/DL (ref 70–110)
POCT GLUCOSE: 179 MG/DL (ref 70–110)
POIKILOCYTOSIS BLD QL SMEAR: SLIGHT
POLYCHROMASIA BLD QL SMEAR: ABNORMAL
POTASSIUM SERPL-SCNC: 4.7 MMOL/L (ref 3.5–5.1)
RBC # BLD AUTO: 3.98 M/UL (ref 4.6–6.2)
SODIUM SERPL-SCNC: 137 MMOL/L (ref 136–145)
WBC # BLD AUTO: 6.31 K/UL (ref 3.9–12.7)

## 2021-03-01 PROCEDURE — 97530 THERAPEUTIC ACTIVITIES: CPT

## 2021-03-01 PROCEDURE — 85025 COMPLETE CBC W/AUTO DIFF WBC: CPT

## 2021-03-01 PROCEDURE — 97116 GAIT TRAINING THERAPY: CPT

## 2021-03-01 PROCEDURE — 97535 SELF CARE MNGMENT TRAINING: CPT

## 2021-03-01 PROCEDURE — 25000003 PHARM REV CODE 250: Performed by: INTERNAL MEDICINE

## 2021-03-01 PROCEDURE — 11000001 HC ACUTE MED/SURG PRIVATE ROOM

## 2021-03-01 PROCEDURE — 25000003 PHARM REV CODE 250: Performed by: PHYSICIAN ASSISTANT

## 2021-03-01 PROCEDURE — 36415 COLL VENOUS BLD VENIPUNCTURE: CPT

## 2021-03-01 PROCEDURE — 99232 SBSQ HOSP IP/OBS MODERATE 35: CPT | Mod: ,,, | Performed by: INTERNAL MEDICINE

## 2021-03-01 PROCEDURE — 63600175 PHARM REV CODE 636 W HCPCS: Performed by: PHYSICIAN ASSISTANT

## 2021-03-01 PROCEDURE — 99232 PR SUBSEQUENT HOSPITAL CARE,LEVL II: ICD-10-PCS | Mod: ,,, | Performed by: INTERNAL MEDICINE

## 2021-03-01 PROCEDURE — C9399 UNCLASSIFIED DRUGS OR BIOLOG: HCPCS | Performed by: PHYSICIAN ASSISTANT

## 2021-03-01 PROCEDURE — 86140 C-REACTIVE PROTEIN: CPT

## 2021-03-01 PROCEDURE — 85652 RBC SED RATE AUTOMATED: CPT

## 2021-03-01 PROCEDURE — 80048 BASIC METABOLIC PNL TOTAL CA: CPT

## 2021-03-01 RX ADMIN — LOSARTAN POTASSIUM 50 MG: 50 TABLET, FILM COATED ORAL at 10:03

## 2021-03-01 RX ADMIN — INSULIN DETEMIR 55 UNITS: 100 INJECTION, SOLUTION SUBCUTANEOUS at 10:03

## 2021-03-01 RX ADMIN — ATORVASTATIN CALCIUM 40 MG: 20 TABLET, FILM COATED ORAL at 10:03

## 2021-03-01 RX ADMIN — AMLODIPINE BESYLATE 5 MG: 5 TABLET ORAL at 10:03

## 2021-03-01 RX ADMIN — INSULIN ASPART 12 UNITS: 100 INJECTION, SOLUTION INTRAVENOUS; SUBCUTANEOUS at 08:03

## 2021-03-01 RX ADMIN — ENOXAPARIN SODIUM 40 MG: 40 INJECTION SUBCUTANEOUS at 10:03

## 2021-03-01 RX ADMIN — METRONIDAZOLE 500 MG: 500 TABLET ORAL at 09:03

## 2021-03-01 RX ADMIN — HYDRALAZINE HYDROCHLORIDE AND ISOSORBIDE DINITRATE 2 TABLET: 37.5; 2 TABLET, FILM COATED ORAL at 10:03

## 2021-03-01 RX ADMIN — CEFTRIAXONE SODIUM 2 G: 2 INJECTION, POWDER, FOR SOLUTION INTRAMUSCULAR; INTRAVENOUS at 09:03

## 2021-03-01 RX ADMIN — METRONIDAZOLE 500 MG: 500 TABLET ORAL at 02:03

## 2021-03-01 RX ADMIN — GABAPENTIN 200 MG: 100 CAPSULE ORAL at 10:03

## 2021-03-01 RX ADMIN — INSULIN ASPART 12 UNITS: 100 INJECTION, SOLUTION INTRAVENOUS; SUBCUTANEOUS at 12:03

## 2021-03-01 RX ADMIN — Medication 1 CAPSULE: at 10:03

## 2021-03-01 RX ADMIN — METRONIDAZOLE 500 MG: 500 TABLET ORAL at 06:03

## 2021-03-01 RX ADMIN — GABAPENTIN 200 MG: 100 CAPSULE ORAL at 09:03

## 2021-03-01 RX ADMIN — HYDRALAZINE HYDROCHLORIDE AND ISOSORBIDE DINITRATE 2 TABLET: 37.5; 2 TABLET, FILM COATED ORAL at 03:03

## 2021-03-01 RX ADMIN — INSULIN ASPART 12 UNITS: 100 INJECTION, SOLUTION INTRAVENOUS; SUBCUTANEOUS at 05:03

## 2021-03-01 RX ADMIN — ASPIRIN 81 MG: 81 TABLET, COATED ORAL at 10:03

## 2021-03-01 RX ADMIN — HYDRALAZINE HYDROCHLORIDE AND ISOSORBIDE DINITRATE 2 TABLET: 37.5; 2 TABLET, FILM COATED ORAL at 09:03

## 2021-03-01 RX ADMIN — PANTOPRAZOLE SODIUM 40 MG: 40 TABLET, DELAYED RELEASE ORAL at 10:03

## 2021-03-01 RX ADMIN — ENOXAPARIN SODIUM 40 MG: 40 INJECTION SUBCUTANEOUS at 09:03

## 2021-03-01 RX ADMIN — LOSARTAN POTASSIUM 50 MG: 50 TABLET, FILM COATED ORAL at 09:03

## 2021-03-02 LAB
ANION GAP SERPL CALC-SCNC: 11 MMOL/L (ref 8–16)
BASOPHILS # BLD AUTO: 0.04 K/UL (ref 0–0.2)
BASOPHILS NFR BLD: 0.6 % (ref 0–1.9)
BUN SERPL-MCNC: 13 MG/DL (ref 8–23)
CALCIUM SERPL-MCNC: 9 MG/DL (ref 8.7–10.5)
CHLORIDE SERPL-SCNC: 104 MMOL/L (ref 95–110)
CO2 SERPL-SCNC: 23 MMOL/L (ref 23–29)
CREAT SERPL-MCNC: 1 MG/DL (ref 0.5–1.4)
DIFFERENTIAL METHOD: ABNORMAL
EOSINOPHIL # BLD AUTO: 0.4 K/UL (ref 0–0.5)
EOSINOPHIL NFR BLD: 6 % (ref 0–8)
ERYTHROCYTE [DISTWIDTH] IN BLOOD BY AUTOMATED COUNT: 18.9 % (ref 11.5–14.5)
EST. GFR  (AFRICAN AMERICAN): >60 ML/MIN/1.73 M^2
EST. GFR  (NON AFRICAN AMERICAN): >60 ML/MIN/1.73 M^2
FINAL PATHOLOGIC DIAGNOSIS: NORMAL
GLUCOSE SERPL-MCNC: 161 MG/DL (ref 70–110)
GROSS: NORMAL
HCT VFR BLD AUTO: 31.4 % (ref 40–54)
HGB BLD-MCNC: 9.5 G/DL (ref 14–18)
IMM GRANULOCYTES # BLD AUTO: 0.02 K/UL (ref 0–0.04)
IMM GRANULOCYTES NFR BLD AUTO: 0.3 % (ref 0–0.5)
LYMPHOCYTES # BLD AUTO: 1.4 K/UL (ref 1–4.8)
LYMPHOCYTES NFR BLD: 21.7 % (ref 18–48)
Lab: NORMAL
MCH RBC QN AUTO: 25.5 PG (ref 27–31)
MCHC RBC AUTO-ENTMCNC: 30.3 G/DL (ref 32–36)
MCV RBC AUTO: 84 FL (ref 82–98)
MICROSCOPIC EXAM: NORMAL
MONOCYTES # BLD AUTO: 0.6 K/UL (ref 0.3–1)
MONOCYTES NFR BLD: 8.7 % (ref 4–15)
NEUTROPHILS # BLD AUTO: 4.1 K/UL (ref 1.8–7.7)
NEUTROPHILS NFR BLD: 62.7 % (ref 38–73)
NRBC BLD-RTO: 0 /100 WBC
PLATELET # BLD AUTO: 378 K/UL (ref 150–350)
PMV BLD AUTO: 10 FL (ref 9.2–12.9)
POCT GLUCOSE: 138 MG/DL (ref 70–110)
POCT GLUCOSE: 156 MG/DL (ref 70–110)
POCT GLUCOSE: 162 MG/DL (ref 70–110)
POCT GLUCOSE: 210 MG/DL (ref 70–110)
POTASSIUM SERPL-SCNC: 4.1 MMOL/L (ref 3.5–5.1)
RBC # BLD AUTO: 3.73 M/UL (ref 4.6–6.2)
SARS-COV-2 RNA RESP QL NAA+PROBE: NOT DETECTED
SODIUM SERPL-SCNC: 138 MMOL/L (ref 136–145)
WBC # BLD AUTO: 6.46 K/UL (ref 3.9–12.7)

## 2021-03-02 PROCEDURE — 11000001 HC ACUTE MED/SURG PRIVATE ROOM

## 2021-03-02 PROCEDURE — 25000003 PHARM REV CODE 250: Performed by: PHYSICIAN ASSISTANT

## 2021-03-02 PROCEDURE — U0003 INFECTIOUS AGENT DETECTION BY NUCLEIC ACID (DNA OR RNA); SEVERE ACUTE RESPIRATORY SYNDROME CORONAVIRUS 2 (SARS-COV-2) (CORONAVIRUS DISEASE [COVID-19]), AMPLIFIED PROBE TECHNIQUE, MAKING USE OF HIGH THROUGHPUT TECHNOLOGIES AS DESCRIBED BY CMS-2020-01-R: HCPCS

## 2021-03-02 PROCEDURE — 86580 TB INTRADERMAL TEST: CPT | Performed by: INTERNAL MEDICINE

## 2021-03-02 PROCEDURE — 99232 PR SUBSEQUENT HOSPITAL CARE,LEVL II: ICD-10-PCS | Mod: ,,, | Performed by: INTERNAL MEDICINE

## 2021-03-02 PROCEDURE — 63600175 PHARM REV CODE 636 W HCPCS: Performed by: PHYSICIAN ASSISTANT

## 2021-03-02 PROCEDURE — 30200315 PPD INTRADERMAL TEST REV CODE 302: Performed by: INTERNAL MEDICINE

## 2021-03-02 PROCEDURE — U0005 INFEC AGEN DETEC AMPLI PROBE: HCPCS

## 2021-03-02 PROCEDURE — 99232 SBSQ HOSP IP/OBS MODERATE 35: CPT | Mod: ,,, | Performed by: INTERNAL MEDICINE

## 2021-03-02 PROCEDURE — 25000003 PHARM REV CODE 250: Performed by: INTERNAL MEDICINE

## 2021-03-02 PROCEDURE — 36415 COLL VENOUS BLD VENIPUNCTURE: CPT

## 2021-03-02 PROCEDURE — 85025 COMPLETE CBC W/AUTO DIFF WBC: CPT

## 2021-03-02 PROCEDURE — C9399 UNCLASSIFIED DRUGS OR BIOLOG: HCPCS | Performed by: PHYSICIAN ASSISTANT

## 2021-03-02 PROCEDURE — 80048 BASIC METABOLIC PNL TOTAL CA: CPT

## 2021-03-02 RX ADMIN — INSULIN DETEMIR 55 UNITS: 100 INJECTION, SOLUTION SUBCUTANEOUS at 08:03

## 2021-03-02 RX ADMIN — ATORVASTATIN CALCIUM 40 MG: 20 TABLET, FILM COATED ORAL at 08:03

## 2021-03-02 RX ADMIN — INSULIN ASPART 2 UNITS: 100 INJECTION, SOLUTION INTRAVENOUS; SUBCUTANEOUS at 12:03

## 2021-03-02 RX ADMIN — AMLODIPINE BESYLATE 5 MG: 5 TABLET ORAL at 08:03

## 2021-03-02 RX ADMIN — GABAPENTIN 200 MG: 100 CAPSULE ORAL at 08:03

## 2021-03-02 RX ADMIN — METRONIDAZOLE 500 MG: 500 TABLET ORAL at 05:03

## 2021-03-02 RX ADMIN — INSULIN ASPART 12 UNITS: 100 INJECTION, SOLUTION INTRAVENOUS; SUBCUTANEOUS at 12:03

## 2021-03-02 RX ADMIN — PANTOPRAZOLE SODIUM 40 MG: 40 TABLET, DELAYED RELEASE ORAL at 08:03

## 2021-03-02 RX ADMIN — ENOXAPARIN SODIUM 40 MG: 40 INJECTION SUBCUTANEOUS at 08:03

## 2021-03-02 RX ADMIN — Medication 1 CAPSULE: at 08:03

## 2021-03-02 RX ADMIN — LOSARTAN POTASSIUM 50 MG: 50 TABLET, FILM COATED ORAL at 08:03

## 2021-03-02 RX ADMIN — ASPIRIN 81 MG: 81 TABLET, COATED ORAL at 09:03

## 2021-03-02 RX ADMIN — TUBERCULIN PURIFIED PROTEIN DERIVATIVE 5 UNITS: 5 INJECTION, SOLUTION INTRADERMAL at 11:03

## 2021-03-02 RX ADMIN — METRONIDAZOLE 500 MG: 500 TABLET ORAL at 10:03

## 2021-03-02 RX ADMIN — INSULIN ASPART 12 UNITS: 100 INJECTION, SOLUTION INTRAVENOUS; SUBCUTANEOUS at 05:03

## 2021-03-02 RX ADMIN — CEFTRIAXONE SODIUM 2 G: 2 INJECTION, POWDER, FOR SOLUTION INTRAMUSCULAR; INTRAVENOUS at 11:03

## 2021-03-02 RX ADMIN — HYDRALAZINE HYDROCHLORIDE AND ISOSORBIDE DINITRATE 2 TABLET: 37.5; 2 TABLET, FILM COATED ORAL at 03:03

## 2021-03-02 RX ADMIN — INSULIN ASPART 12 UNITS: 100 INJECTION, SOLUTION INTRAVENOUS; SUBCUTANEOUS at 08:03

## 2021-03-02 RX ADMIN — HYDRALAZINE HYDROCHLORIDE AND ISOSORBIDE DINITRATE 2 TABLET: 37.5; 2 TABLET, FILM COATED ORAL at 08:03

## 2021-03-02 RX ADMIN — METRONIDAZOLE 500 MG: 500 TABLET ORAL at 03:03

## 2021-03-03 VITALS
TEMPERATURE: 99 F | DIASTOLIC BLOOD PRESSURE: 67 MMHG | RESPIRATION RATE: 15 BRPM | WEIGHT: 287.94 LBS | BODY MASS INDEX: 42.65 KG/M2 | OXYGEN SATURATION: 97 % | HEART RATE: 58 BPM | HEIGHT: 69 IN | SYSTOLIC BLOOD PRESSURE: 129 MMHG

## 2021-03-03 LAB
ANION GAP SERPL CALC-SCNC: 9 MMOL/L (ref 8–16)
ANISOCYTOSIS BLD QL SMEAR: SLIGHT
BASOPHILS # BLD AUTO: 0.03 K/UL (ref 0–0.2)
BASOPHILS NFR BLD: 0.5 % (ref 0–1.9)
BUN SERPL-MCNC: 15 MG/DL (ref 8–23)
BURR CELLS BLD QL SMEAR: ABNORMAL
CALCIUM SERPL-MCNC: 8.6 MG/DL (ref 8.7–10.5)
CHLORIDE SERPL-SCNC: 102 MMOL/L (ref 95–110)
CO2 SERPL-SCNC: 24 MMOL/L (ref 23–29)
CREAT SERPL-MCNC: 1.1 MG/DL (ref 0.5–1.4)
DIFFERENTIAL METHOD: ABNORMAL
EOSINOPHIL # BLD AUTO: 0.4 K/UL (ref 0–0.5)
EOSINOPHIL NFR BLD: 6.8 % (ref 0–8)
ERYTHROCYTE [DISTWIDTH] IN BLOOD BY AUTOMATED COUNT: 19.1 % (ref 11.5–14.5)
EST. GFR  (AFRICAN AMERICAN): >60 ML/MIN/1.73 M^2
EST. GFR  (NON AFRICAN AMERICAN): >60 ML/MIN/1.73 M^2
GLUCOSE SERPL-MCNC: 135 MG/DL (ref 70–110)
HCT VFR BLD AUTO: 31.1 % (ref 40–54)
HGB BLD-MCNC: 9.3 G/DL (ref 14–18)
IMM GRANULOCYTES # BLD AUTO: 0.03 K/UL (ref 0–0.04)
IMM GRANULOCYTES NFR BLD AUTO: 0.5 % (ref 0–0.5)
LYMPHOCYTES # BLD AUTO: 1.5 K/UL (ref 1–4.8)
LYMPHOCYTES NFR BLD: 26.7 % (ref 18–48)
MCH RBC QN AUTO: 25.6 PG (ref 27–31)
MCHC RBC AUTO-ENTMCNC: 29.9 G/DL (ref 32–36)
MCV RBC AUTO: 86 FL (ref 82–98)
MONOCYTES # BLD AUTO: 0.6 K/UL (ref 0.3–1)
MONOCYTES NFR BLD: 10 % (ref 4–15)
NEUTROPHILS # BLD AUTO: 3.1 K/UL (ref 1.8–7.7)
NEUTROPHILS NFR BLD: 55.5 % (ref 38–73)
NRBC BLD-RTO: 0 /100 WBC
OVALOCYTES BLD QL SMEAR: ABNORMAL
PLATELET # BLD AUTO: 362 K/UL (ref 150–350)
PLATELET BLD QL SMEAR: ABNORMAL
PMV BLD AUTO: 9.9 FL (ref 9.2–12.9)
POCT GLUCOSE: 124 MG/DL (ref 70–110)
POCT GLUCOSE: 177 MG/DL (ref 70–110)
POIKILOCYTOSIS BLD QL SMEAR: SLIGHT
POTASSIUM SERPL-SCNC: 4.2 MMOL/L (ref 3.5–5.1)
RBC # BLD AUTO: 3.63 M/UL (ref 4.6–6.2)
SODIUM SERPL-SCNC: 135 MMOL/L (ref 136–145)
WBC # BLD AUTO: 5.58 K/UL (ref 3.9–12.7)

## 2021-03-03 PROCEDURE — 25000003 PHARM REV CODE 250: Performed by: PHYSICIAN ASSISTANT

## 2021-03-03 PROCEDURE — 36573 INSJ PICC RS&I 5 YR+: CPT

## 2021-03-03 PROCEDURE — 85025 COMPLETE CBC W/AUTO DIFF WBC: CPT | Performed by: PHYSICIAN ASSISTANT

## 2021-03-03 PROCEDURE — 80048 BASIC METABOLIC PNL TOTAL CA: CPT | Performed by: PHYSICIAN ASSISTANT

## 2021-03-03 PROCEDURE — 76937 US GUIDE VASCULAR ACCESS: CPT

## 2021-03-03 PROCEDURE — 36415 COLL VENOUS BLD VENIPUNCTURE: CPT

## 2021-03-03 PROCEDURE — 25000003 PHARM REV CODE 250: Performed by: INTERNAL MEDICINE

## 2021-03-03 PROCEDURE — 63600175 PHARM REV CODE 636 W HCPCS: Performed by: PHYSICIAN ASSISTANT

## 2021-03-03 PROCEDURE — C1751 CATH, INF, PER/CENT/MIDLINE: HCPCS

## 2021-03-03 PROCEDURE — 99239 PR HOSPITAL DISCHARGE DAY,>30 MIN: ICD-10-PCS | Mod: ,,, | Performed by: INTERNAL MEDICINE

## 2021-03-03 PROCEDURE — 99239 HOSP IP/OBS DSCHRG MGMT >30: CPT | Mod: ,,, | Performed by: INTERNAL MEDICINE

## 2021-03-03 PROCEDURE — A4216 STERILE WATER/SALINE, 10 ML: HCPCS | Performed by: INTERNAL MEDICINE

## 2021-03-03 RX ORDER — SODIUM CHLORIDE 0.9 % (FLUSH) 0.9 %
10 SYRINGE (ML) INJECTION EVERY 6 HOURS
Status: DISCONTINUED | OUTPATIENT
Start: 2021-03-03 | End: 2021-03-03 | Stop reason: HOSPADM

## 2021-03-03 RX ORDER — SODIUM CHLORIDE 0.9 % (FLUSH) 0.9 %
10 SYRINGE (ML) INJECTION
Status: DISCONTINUED | OUTPATIENT
Start: 2021-03-03 | End: 2021-03-03 | Stop reason: HOSPADM

## 2021-03-03 RX ORDER — MUPIROCIN 20 MG/G
OINTMENT TOPICAL 2 TIMES DAILY
Status: DISCONTINUED | OUTPATIENT
Start: 2021-03-03 | End: 2021-03-03 | Stop reason: HOSPADM

## 2021-03-03 RX ADMIN — GABAPENTIN 200 MG: 100 CAPSULE ORAL at 08:03

## 2021-03-03 RX ADMIN — INSULIN ASPART 12 UNITS: 100 INJECTION, SOLUTION INTRAVENOUS; SUBCUTANEOUS at 08:03

## 2021-03-03 RX ADMIN — Medication 1 CAPSULE: at 08:03

## 2021-03-03 RX ADMIN — ATORVASTATIN CALCIUM 40 MG: 20 TABLET, FILM COATED ORAL at 08:03

## 2021-03-03 RX ADMIN — LOSARTAN POTASSIUM 50 MG: 50 TABLET, FILM COATED ORAL at 08:03

## 2021-03-03 RX ADMIN — AMLODIPINE BESYLATE 5 MG: 5 TABLET ORAL at 08:03

## 2021-03-03 RX ADMIN — INSULIN ASPART 12 UNITS: 100 INJECTION, SOLUTION INTRAVENOUS; SUBCUTANEOUS at 01:03

## 2021-03-03 RX ADMIN — PANTOPRAZOLE SODIUM 40 MG: 40 TABLET, DELAYED RELEASE ORAL at 08:03

## 2021-03-03 RX ADMIN — ENOXAPARIN SODIUM 40 MG: 40 INJECTION SUBCUTANEOUS at 08:03

## 2021-03-03 RX ADMIN — Medication 10 ML: at 01:03

## 2021-03-03 RX ADMIN — METRONIDAZOLE 500 MG: 500 TABLET ORAL at 06:03

## 2021-03-03 RX ADMIN — METRONIDAZOLE 500 MG: 500 TABLET ORAL at 01:03

## 2021-03-03 RX ADMIN — HYDRALAZINE HYDROCHLORIDE AND ISOSORBIDE DINITRATE 2 TABLET: 37.5; 2 TABLET, FILM COATED ORAL at 08:03

## 2021-03-03 RX ADMIN — ASPIRIN 81 MG: 81 TABLET, COATED ORAL at 08:03

## 2021-03-04 ENCOUNTER — TELEPHONE (OUTPATIENT)
Dept: PODIATRY | Facility: CLINIC | Age: 71
End: 2021-03-04

## 2021-03-04 DIAGNOSIS — L97.222 SKIN ULCER OF LEFT CALF WITH FAT LAYER EXPOSED: Primary | ICD-10-CM

## 2021-03-04 DIAGNOSIS — S91.302A NON HEALING LEFT HEEL WOUND: ICD-10-CM

## 2021-03-09 ENCOUNTER — OFFICE VISIT (OUTPATIENT)
Dept: PODIATRY | Facility: CLINIC | Age: 71
End: 2021-03-09
Payer: MEDICARE

## 2021-03-09 VITALS
SYSTOLIC BLOOD PRESSURE: 156 MMHG | HEIGHT: 69 IN | HEART RATE: 49 BPM | RESPIRATION RATE: 18 BRPM | DIASTOLIC BLOOD PRESSURE: 65 MMHG | BODY MASS INDEX: 42.52 KG/M2

## 2021-03-09 DIAGNOSIS — S91.302A NON HEALING LEFT HEEL WOUND: Primary | ICD-10-CM

## 2021-03-09 DIAGNOSIS — I73.9 PERIPHERAL ARTERIAL DISEASE: ICD-10-CM

## 2021-03-09 DIAGNOSIS — I73.9 PVD (PERIPHERAL VASCULAR DISEASE): ICD-10-CM

## 2021-03-09 PROCEDURE — 99999 PR PBB SHADOW E&M-EST. PATIENT-LVL III: CPT | Mod: PBBFAC,,, | Performed by: PODIATRIST

## 2021-03-09 PROCEDURE — 99213 OFFICE O/P EST LOW 20 MIN: CPT | Mod: PBBFAC | Performed by: PODIATRIST

## 2021-03-09 PROCEDURE — 99213 OFFICE O/P EST LOW 20 MIN: CPT | Mod: S$GLB,,, | Performed by: PODIATRIST

## 2021-03-09 PROCEDURE — 99999 PR PBB SHADOW E&M-EST. PATIENT-LVL III: ICD-10-PCS | Mod: PBBFAC,,, | Performed by: PODIATRIST

## 2021-03-09 PROCEDURE — 99213 PR OFFICE/OUTPT VISIT, EST, LEVL III, 20-29 MIN: ICD-10-PCS | Mod: S$GLB,,, | Performed by: PODIATRIST

## 2021-03-10 ENCOUNTER — TELEPHONE (OUTPATIENT)
Dept: INFECTIOUS DISEASES | Facility: CLINIC | Age: 71
End: 2021-03-10

## 2021-03-12 ENCOUNTER — TELEPHONE (OUTPATIENT)
Dept: INFECTIOUS DISEASES | Facility: CLINIC | Age: 71
End: 2021-03-12

## 2021-03-15 ENCOUNTER — LAB VISIT (OUTPATIENT)
Dept: LAB | Facility: OTHER | Age: 71
End: 2021-03-15
Payer: MEDICAID

## 2021-03-15 DIAGNOSIS — Z20.822 ENCOUNTER FOR LABORATORY TESTING FOR COVID-19 VIRUS: ICD-10-CM

## 2021-03-15 PROCEDURE — U0003 INFECTIOUS AGENT DETECTION BY NUCLEIC ACID (DNA OR RNA); SEVERE ACUTE RESPIRATORY SYNDROME CORONAVIRUS 2 (SARS-COV-2) (CORONAVIRUS DISEASE [COVID-19]), AMPLIFIED PROBE TECHNIQUE, MAKING USE OF HIGH THROUGHPUT TECHNOLOGIES AS DESCRIBED BY CMS-2020-01-R: HCPCS | Performed by: NURSE PRACTITIONER

## 2021-03-16 LAB — SARS-COV-2 RNA RESP QL NAA+PROBE: NOT DETECTED

## 2021-03-23 ENCOUNTER — OFFICE VISIT (OUTPATIENT)
Dept: INFECTIOUS DISEASES | Facility: CLINIC | Age: 71
End: 2021-03-23
Payer: MEDICARE

## 2021-03-23 ENCOUNTER — OFFICE VISIT (OUTPATIENT)
Dept: PODIATRY | Facility: CLINIC | Age: 71
End: 2021-03-23
Payer: MEDICARE

## 2021-03-23 VITALS
HEART RATE: 53 BPM | DIASTOLIC BLOOD PRESSURE: 69 MMHG | BODY MASS INDEX: 42.52 KG/M2 | HEIGHT: 69 IN | RESPIRATION RATE: 18 BRPM | SYSTOLIC BLOOD PRESSURE: 143 MMHG

## 2021-03-23 DIAGNOSIS — S91.302D OPEN WOUND OF LEFT FOOT WITH COMPLICATION, SUBSEQUENT ENCOUNTER: ICD-10-CM

## 2021-03-23 DIAGNOSIS — Z79.2 RECEIVING INTRAVENOUS ANTIBIOTIC TREATMENT AS OUTPATIENT: Primary | ICD-10-CM

## 2021-03-23 DIAGNOSIS — I73.9 PERIPHERAL ARTERIAL DISEASE: ICD-10-CM

## 2021-03-23 DIAGNOSIS — S91.302A NON HEALING LEFT HEEL WOUND: Primary | ICD-10-CM

## 2021-03-23 DIAGNOSIS — I73.9 PVD (PERIPHERAL VASCULAR DISEASE): ICD-10-CM

## 2021-03-23 DIAGNOSIS — M86.172 ACUTE OSTEOMYELITIS OF CALCANEUM, LEFT: ICD-10-CM

## 2021-03-23 LAB — FUNGUS SPEC CULT: NORMAL

## 2021-03-23 PROCEDURE — 99212 OFFICE O/P EST SF 10 MIN: CPT | Mod: PBBFAC | Performed by: PODIATRIST

## 2021-03-23 PROCEDURE — 99999 PR PBB SHADOW E&M-EST. PATIENT-LVL II: ICD-10-PCS | Mod: PBBFAC,,, | Performed by: PODIATRIST

## 2021-03-23 PROCEDURE — 99213 PR OFFICE/OUTPT VISIT, EST, LEVL III, 20-29 MIN: ICD-10-PCS | Mod: S$GLB,,, | Performed by: PHYSICIAN ASSISTANT

## 2021-03-23 PROCEDURE — 99999 PR PBB SHADOW E&M-EST. PATIENT-LVL III: ICD-10-PCS | Mod: PBBFAC,,, | Performed by: PHYSICIAN ASSISTANT

## 2021-03-23 PROCEDURE — 99999 PR PBB SHADOW E&M-EST. PATIENT-LVL III: CPT | Mod: PBBFAC,,, | Performed by: PHYSICIAN ASSISTANT

## 2021-03-23 PROCEDURE — 99999 PR PBB SHADOW E&M-EST. PATIENT-LVL II: CPT | Mod: PBBFAC,,, | Performed by: PODIATRIST

## 2021-03-23 PROCEDURE — 99213 PR OFFICE/OUTPT VISIT, EST, LEVL III, 20-29 MIN: ICD-10-PCS | Mod: S$GLB,,, | Performed by: PODIATRIST

## 2021-03-23 PROCEDURE — 99213 OFFICE O/P EST LOW 20 MIN: CPT | Mod: S$GLB,,, | Performed by: PODIATRIST

## 2021-03-23 PROCEDURE — 99213 OFFICE O/P EST LOW 20 MIN: CPT | Mod: S$GLB,,, | Performed by: PHYSICIAN ASSISTANT

## 2021-03-23 PROCEDURE — 99213 OFFICE O/P EST LOW 20 MIN: CPT | Mod: PBBFAC | Performed by: PHYSICIAN ASSISTANT

## 2021-03-29 ENCOUNTER — LAB VISIT (OUTPATIENT)
Dept: LAB | Facility: OTHER | Age: 71
End: 2021-03-29
Payer: MEDICARE

## 2021-03-29 DIAGNOSIS — Z20.822 ENCOUNTER FOR LABORATORY TESTING FOR COVID-19 VIRUS: ICD-10-CM

## 2021-03-29 PROCEDURE — U0003 INFECTIOUS AGENT DETECTION BY NUCLEIC ACID (DNA OR RNA); SEVERE ACUTE RESPIRATORY SYNDROME CORONAVIRUS 2 (SARS-COV-2) (CORONAVIRUS DISEASE [COVID-19]), AMPLIFIED PROBE TECHNIQUE, MAKING USE OF HIGH THROUGHPUT TECHNOLOGIES AS DESCRIBED BY CMS-2020-01-R: HCPCS | Performed by: NURSE PRACTITIONER

## 2021-03-30 LAB — SARS-COV-2 RNA RESP QL NAA+PROBE: NOT DETECTED

## 2021-04-27 LAB
ACID FAST MOD KINY STN SPEC: NORMAL
MYCOBACTERIUM SPEC QL CULT: NORMAL

## 2021-06-04 ENCOUNTER — TELEPHONE (OUTPATIENT)
Dept: WOUND CARE | Facility: CLINIC | Age: 71
End: 2021-06-04

## 2021-06-04 PROCEDURE — G0179 PR HOME HEALTH MD RECERTIFICATION: ICD-10-PCS | Mod: ,,, | Performed by: NURSE PRACTITIONER

## 2021-06-04 PROCEDURE — G0179 MD RECERTIFICATION HHA PT: HCPCS | Mod: ,,, | Performed by: NURSE PRACTITIONER

## 2021-06-09 ENCOUNTER — OFFICE VISIT (OUTPATIENT)
Dept: WOUND CARE | Facility: CLINIC | Age: 71
End: 2021-06-09
Payer: MEDICARE

## 2021-06-09 VITALS
HEART RATE: 42 BPM | TEMPERATURE: 99 F | SYSTOLIC BLOOD PRESSURE: 175 MMHG | WEIGHT: 280 LBS | BODY MASS INDEX: 41.47 KG/M2 | HEIGHT: 69 IN | DIASTOLIC BLOOD PRESSURE: 62 MMHG

## 2021-06-09 DIAGNOSIS — S81.802A OPEN WOUND OF LEFT LOWER LEG, INITIAL ENCOUNTER: Primary | ICD-10-CM

## 2021-06-09 DIAGNOSIS — I73.9 PERIPHERAL ARTERIAL DISEASE: Chronic | ICD-10-CM

## 2021-06-09 PROCEDURE — 3051F PR MOST RECENT HEMOGLOBIN A1C LEVEL 7.0 - < 8.0%: ICD-10-PCS | Mod: CPTII,S$GLB,, | Performed by: NURSE PRACTITIONER

## 2021-06-09 PROCEDURE — 3051F HG A1C>EQUAL 7.0%<8.0%: CPT | Mod: CPTII,S$GLB,, | Performed by: NURSE PRACTITIONER

## 2021-06-09 PROCEDURE — 3008F PR BODY MASS INDEX (BMI) DOCUMENTED: ICD-10-PCS | Mod: CPTII,S$GLB,, | Performed by: NURSE PRACTITIONER

## 2021-06-09 PROCEDURE — 1125F AMNT PAIN NOTED PAIN PRSNT: CPT | Mod: S$GLB,,, | Performed by: NURSE PRACTITIONER

## 2021-06-09 PROCEDURE — 99999 PR PBB SHADOW E&M-EST. PATIENT-LVL V: CPT | Mod: PBBFAC,,, | Performed by: NURSE PRACTITIONER

## 2021-06-09 PROCEDURE — 3288F FALL RISK ASSESSMENT DOCD: CPT | Mod: CPTII,S$GLB,, | Performed by: NURSE PRACTITIONER

## 2021-06-09 PROCEDURE — 1101F PT FALLS ASSESS-DOCD LE1/YR: CPT | Mod: CPTII,S$GLB,, | Performed by: NURSE PRACTITIONER

## 2021-06-09 PROCEDURE — 1101F PR PT FALLS ASSESS DOC 0-1 FALLS W/OUT INJ PAST YR: ICD-10-PCS | Mod: CPTII,S$GLB,, | Performed by: NURSE PRACTITIONER

## 2021-06-09 PROCEDURE — 1159F PR MEDICATION LIST DOCUMENTED IN MEDICAL RECORD: ICD-10-PCS | Mod: S$GLB,,, | Performed by: NURSE PRACTITIONER

## 2021-06-09 PROCEDURE — 99214 PR OFFICE/OUTPT VISIT, EST, LEVL IV, 30-39 MIN: ICD-10-PCS | Mod: S$GLB,,, | Performed by: NURSE PRACTITIONER

## 2021-06-09 PROCEDURE — 99999 PR PBB SHADOW E&M-EST. PATIENT-LVL V: ICD-10-PCS | Mod: PBBFAC,,, | Performed by: NURSE PRACTITIONER

## 2021-06-09 PROCEDURE — 99214 OFFICE O/P EST MOD 30 MIN: CPT | Mod: S$GLB,,, | Performed by: NURSE PRACTITIONER

## 2021-06-09 PROCEDURE — 1159F MED LIST DOCD IN RCRD: CPT | Mod: S$GLB,,, | Performed by: NURSE PRACTITIONER

## 2021-06-09 PROCEDURE — 3008F BODY MASS INDEX DOCD: CPT | Mod: CPTII,S$GLB,, | Performed by: NURSE PRACTITIONER

## 2021-06-09 PROCEDURE — 1125F PR PAIN SEVERITY QUANTIFIED, PAIN PRESENT: ICD-10-PCS | Mod: S$GLB,,, | Performed by: NURSE PRACTITIONER

## 2021-06-09 PROCEDURE — 3288F PR FALLS RISK ASSESSMENT DOCUMENTED: ICD-10-PCS | Mod: CPTII,S$GLB,, | Performed by: NURSE PRACTITIONER

## 2021-06-09 RX ORDER — ASCORBIC ACID 500 MG
500 TABLET ORAL 2 TIMES DAILY
Status: ON HOLD | COMMUNITY
Start: 2021-05-05 | End: 2022-06-20

## 2021-06-09 RX ORDER — METFORMIN HYDROCHLORIDE EXTENDED-RELEASE TABLETS 500 MG/1
1000 TABLET, FILM COATED, EXTENDED RELEASE ORAL 2 TIMES DAILY
COMMUNITY
Start: 2021-04-09 | End: 2021-08-18 | Stop reason: SDUPTHER

## 2021-06-09 RX ORDER — INSULIN GLARGINE 100 [IU]/ML
68 INJECTION, SOLUTION SUBCUTANEOUS NIGHTLY
Status: ON HOLD | COMMUNITY
Start: 2021-05-13 | End: 2022-06-24 | Stop reason: SDUPTHER

## 2021-06-09 RX ORDER — LOSARTAN POTASSIUM AND HYDROCHLOROTHIAZIDE 25; 100 MG/1; MG/1
1 TABLET ORAL DAILY
Status: ON HOLD | COMMUNITY
Start: 2021-04-28 | End: 2022-06-20

## 2021-06-09 RX ORDER — ROSUVASTATIN CALCIUM 40 MG/1
40 TABLET, COATED ORAL DAILY
Status: ON HOLD | COMMUNITY
Start: 2021-04-09

## 2021-06-21 ENCOUNTER — TELEPHONE (OUTPATIENT)
Dept: PODIATRY | Facility: CLINIC | Age: 71
End: 2021-06-21

## 2021-06-23 ENCOUNTER — OFFICE VISIT (OUTPATIENT)
Dept: PODIATRY | Facility: CLINIC | Age: 71
End: 2021-06-23
Payer: MEDICARE

## 2021-06-23 VITALS
BODY MASS INDEX: 41.47 KG/M2 | DIASTOLIC BLOOD PRESSURE: 64 MMHG | WEIGHT: 280 LBS | SYSTOLIC BLOOD PRESSURE: 158 MMHG | HEART RATE: 78 BPM | HEIGHT: 69 IN

## 2021-06-23 DIAGNOSIS — L97.222 SKIN ULCER OF LEFT CALF WITH FAT LAYER EXPOSED: Primary | ICD-10-CM

## 2021-06-23 DIAGNOSIS — I73.9 PERIPHERAL ARTERIAL DISEASE: ICD-10-CM

## 2021-06-23 DIAGNOSIS — S91.302A NON HEALING LEFT HEEL WOUND: ICD-10-CM

## 2021-06-23 PROCEDURE — 1159F MED LIST DOCD IN RCRD: CPT | Mod: S$GLB,,, | Performed by: PODIATRIST

## 2021-06-23 PROCEDURE — 11042 DBRDMT SUBQ TIS 1ST 20SQCM/<: CPT | Mod: LT,S$GLB,, | Performed by: PODIATRIST

## 2021-06-23 PROCEDURE — 99213 PR OFFICE/OUTPT VISIT, EST, LEVL III, 20-29 MIN: ICD-10-PCS | Mod: 25,S$GLB,, | Performed by: PODIATRIST

## 2021-06-23 PROCEDURE — 99999 PR PBB SHADOW E&M-EST. PATIENT-LVL IV: ICD-10-PCS | Mod: PBBFAC,,, | Performed by: PODIATRIST

## 2021-06-23 PROCEDURE — 11042 DBRDMT SUBQ TIS 1ST 20SQCM/<: CPT | Mod: PBBFAC,LT | Performed by: PODIATRIST

## 2021-06-23 PROCEDURE — 1159F PR MEDICATION LIST DOCUMENTED IN MEDICAL RECORD: ICD-10-PCS | Mod: S$GLB,,, | Performed by: PODIATRIST

## 2021-06-23 PROCEDURE — 11042 PR DEBRIDEMENT, SKIN, SUB-Q TISSUE,=<20 SQ CM: ICD-10-PCS | Mod: LT,S$GLB,, | Performed by: PODIATRIST

## 2021-06-23 PROCEDURE — 99999 PR PBB SHADOW E&M-EST. PATIENT-LVL IV: CPT | Mod: PBBFAC,,, | Performed by: PODIATRIST

## 2021-06-23 PROCEDURE — 99214 OFFICE O/P EST MOD 30 MIN: CPT | Mod: PBBFAC,25 | Performed by: PODIATRIST

## 2021-06-23 PROCEDURE — 99213 OFFICE O/P EST LOW 20 MIN: CPT | Mod: 25,S$GLB,, | Performed by: PODIATRIST

## 2021-06-24 ENCOUNTER — EXTERNAL HOME HEALTH (OUTPATIENT)
Dept: HOME HEALTH SERVICES | Facility: HOSPITAL | Age: 71
End: 2021-06-24
Payer: MEDICARE

## 2021-06-30 ENCOUNTER — DOCUMENT SCAN (OUTPATIENT)
Dept: HOME HEALTH SERVICES | Facility: HOSPITAL | Age: 71
End: 2021-06-30
Payer: MEDICARE

## 2021-07-07 ENCOUNTER — OFFICE VISIT (OUTPATIENT)
Dept: PODIATRY | Facility: CLINIC | Age: 71
End: 2021-07-07
Payer: MEDICARE

## 2021-07-07 VITALS — RESPIRATION RATE: 18 BRPM | HEIGHT: 69 IN | WEIGHT: 279 LBS | BODY MASS INDEX: 41.32 KG/M2

## 2021-07-07 DIAGNOSIS — I73.9 PERIPHERAL ARTERIAL DISEASE: Primary | ICD-10-CM

## 2021-07-07 DIAGNOSIS — S91.302S OPEN WOUND OF LEFT HEEL, SEQUELA: ICD-10-CM

## 2021-07-07 PROCEDURE — 1159F MED LIST DOCD IN RCRD: CPT | Mod: CPTII,S$GLB,, | Performed by: PODIATRIST

## 2021-07-07 PROCEDURE — 3051F HG A1C>EQUAL 7.0%<8.0%: CPT | Mod: CPTII,S$GLB,, | Performed by: PODIATRIST

## 2021-07-07 PROCEDURE — 99999 PR PBB SHADOW E&M-EST. PATIENT-LVL IV: ICD-10-PCS | Mod: PBBFAC,,, | Performed by: PODIATRIST

## 2021-07-07 PROCEDURE — 99999 PR PBB SHADOW E&M-EST. PATIENT-LVL IV: CPT | Mod: PBBFAC,,, | Performed by: PODIATRIST

## 2021-07-07 PROCEDURE — 99213 PR OFFICE/OUTPT VISIT, EST, LEVL III, 20-29 MIN: ICD-10-PCS | Mod: S$GLB,,, | Performed by: PODIATRIST

## 2021-07-07 PROCEDURE — 1125F PR PAIN SEVERITY QUANTIFIED, PAIN PRESENT: ICD-10-PCS | Mod: CPTII,S$GLB,, | Performed by: PODIATRIST

## 2021-07-07 PROCEDURE — 3008F BODY MASS INDEX DOCD: CPT | Mod: CPTII,S$GLB,, | Performed by: PODIATRIST

## 2021-07-07 PROCEDURE — 3051F PR MOST RECENT HEMOGLOBIN A1C LEVEL 7.0 - < 8.0%: ICD-10-PCS | Mod: CPTII,S$GLB,, | Performed by: PODIATRIST

## 2021-07-07 PROCEDURE — 3008F PR BODY MASS INDEX (BMI) DOCUMENTED: ICD-10-PCS | Mod: CPTII,S$GLB,, | Performed by: PODIATRIST

## 2021-07-07 PROCEDURE — 99213 OFFICE O/P EST LOW 20 MIN: CPT | Mod: S$GLB,,, | Performed by: PODIATRIST

## 2021-07-07 PROCEDURE — 1125F AMNT PAIN NOTED PAIN PRSNT: CPT | Mod: CPTII,S$GLB,, | Performed by: PODIATRIST

## 2021-07-07 PROCEDURE — 1159F PR MEDICATION LIST DOCUMENTED IN MEDICAL RECORD: ICD-10-PCS | Mod: CPTII,S$GLB,, | Performed by: PODIATRIST

## 2021-07-21 ENCOUNTER — TELEPHONE (OUTPATIENT)
Dept: PODIATRY | Facility: CLINIC | Age: 71
End: 2021-07-21

## 2021-07-22 ENCOUNTER — OFFICE VISIT (OUTPATIENT)
Dept: PODIATRY | Facility: CLINIC | Age: 71
End: 2021-07-22
Payer: MEDICARE

## 2021-07-22 VITALS — HEIGHT: 69 IN | BODY MASS INDEX: 41.34 KG/M2 | WEIGHT: 279.13 LBS

## 2021-07-22 DIAGNOSIS — S91.302A NON HEALING LEFT HEEL WOUND: Primary | ICD-10-CM

## 2021-07-22 DIAGNOSIS — L97.222 SKIN ULCER OF LEFT CALF WITH FAT LAYER EXPOSED: ICD-10-CM

## 2021-07-22 DIAGNOSIS — I73.9 PVD (PERIPHERAL VASCULAR DISEASE): ICD-10-CM

## 2021-07-22 PROCEDURE — 11043 PR DEBRIDEMENT, SKIN, SUB-Q TISSUE,MUSCLE,=<20 SQ CM: ICD-10-PCS | Mod: S$GLB,,, | Performed by: PODIATRIST

## 2021-07-22 PROCEDURE — 11042 DBRDMT SUBQ TIS 1ST 20SQCM/<: CPT | Mod: PBBFAC | Performed by: PODIATRIST

## 2021-07-22 PROCEDURE — 99499 NO LOS: ICD-10-PCS | Mod: S$GLB,,, | Performed by: PODIATRIST

## 2021-07-22 PROCEDURE — 99999 PR PBB SHADOW E&M-EST. PATIENT-LVL III: ICD-10-PCS | Mod: PBBFAC,,, | Performed by: PODIATRIST

## 2021-07-22 PROCEDURE — 99499 UNLISTED E&M SERVICE: CPT | Mod: S$GLB,,, | Performed by: PODIATRIST

## 2021-07-22 PROCEDURE — 11044 DBRDMT BONE 1ST 20 SQ CM/<: CPT | Mod: PBBFAC | Performed by: PODIATRIST

## 2021-07-22 PROCEDURE — 99213 OFFICE O/P EST LOW 20 MIN: CPT | Mod: PBBFAC,25 | Performed by: PODIATRIST

## 2021-07-22 PROCEDURE — 11046 DBRDMT MUSC&/FSCA EA ADDL: CPT | Mod: S$GLB,,, | Performed by: PODIATRIST

## 2021-07-22 PROCEDURE — 11046 WOUND DEBRIDEMENT: ICD-10-PCS | Mod: S$GLB,,, | Performed by: PODIATRIST

## 2021-07-22 PROCEDURE — 11043 DBRDMT MUSC&/FSCA 1ST 20/<: CPT | Mod: PBBFAC | Performed by: PODIATRIST

## 2021-07-22 PROCEDURE — 11043 DBRDMT MUSC&/FSCA 1ST 20/<: CPT | Mod: S$GLB,,, | Performed by: PODIATRIST

## 2021-07-22 PROCEDURE — 99999 PR PBB SHADOW E&M-EST. PATIENT-LVL III: CPT | Mod: PBBFAC,,, | Performed by: PODIATRIST

## 2021-07-22 RX ORDER — AMOXICILLIN AND CLAVULANATE POTASSIUM 875; 125 MG/1; MG/1
1 TABLET, FILM COATED ORAL 2 TIMES DAILY
Qty: 20 TABLET | Refills: 0 | Status: SHIPPED | OUTPATIENT
Start: 2021-07-22 | End: 2022-03-09 | Stop reason: ALTCHOICE

## 2021-07-28 ENCOUNTER — OFFICE VISIT (OUTPATIENT)
Dept: PODIATRY | Facility: CLINIC | Age: 71
End: 2021-07-28
Payer: MEDICARE

## 2021-07-28 VITALS — HEIGHT: 69 IN | BODY MASS INDEX: 41.32 KG/M2 | TEMPERATURE: 99 F | WEIGHT: 279 LBS

## 2021-07-28 DIAGNOSIS — I73.9 PVD (PERIPHERAL VASCULAR DISEASE): Primary | ICD-10-CM

## 2021-07-28 DIAGNOSIS — S91.302A NON HEALING LEFT HEEL WOUND: ICD-10-CM

## 2021-07-28 PROCEDURE — 99499 UNLISTED E&M SERVICE: CPT | Mod: S$GLB,,, | Performed by: PODIATRIST

## 2021-07-28 PROCEDURE — 99499 NO LOS: ICD-10-PCS | Mod: S$GLB,,, | Performed by: PODIATRIST

## 2021-07-28 PROCEDURE — 11042 DBRDMT SUBQ TIS 1ST 20SQCM/<: CPT | Mod: S$GLB,,, | Performed by: PODIATRIST

## 2021-07-28 PROCEDURE — 99999 PR PBB SHADOW E&M-EST. PATIENT-LVL III: CPT | Mod: PBBFAC,,, | Performed by: PODIATRIST

## 2021-07-28 PROCEDURE — 11042 PR DEBRIDEMENT, SKIN, SUB-Q TISSUE,=<20 SQ CM: ICD-10-PCS | Mod: S$GLB,,, | Performed by: PODIATRIST

## 2021-07-28 PROCEDURE — 99213 OFFICE O/P EST LOW 20 MIN: CPT | Mod: PBBFAC,25 | Performed by: PODIATRIST

## 2021-07-28 PROCEDURE — 11042 DBRDMT SUBQ TIS 1ST 20SQCM/<: CPT | Mod: PBBFAC | Performed by: PODIATRIST

## 2021-07-28 PROCEDURE — 99999 PR PBB SHADOW E&M-EST. PATIENT-LVL III: ICD-10-PCS | Mod: PBBFAC,,, | Performed by: PODIATRIST

## 2021-07-28 RX ORDER — FUROSEMIDE 40 MG/1
40 TABLET ORAL EVERY OTHER DAY
COMMUNITY
Start: 2021-07-27 | End: 2022-06-15 | Stop reason: CLARIF

## 2021-07-30 ENCOUNTER — TELEPHONE (OUTPATIENT)
Dept: WOUND CARE | Facility: CLINIC | Age: 71
End: 2021-07-30

## 2021-08-04 ENCOUNTER — OFFICE VISIT (OUTPATIENT)
Dept: PODIATRY | Facility: CLINIC | Age: 71
End: 2021-08-04
Payer: MEDICARE

## 2021-08-04 VITALS — TEMPERATURE: 98 F | BODY MASS INDEX: 40.73 KG/M2 | HEIGHT: 69 IN | RESPIRATION RATE: 18 BRPM | WEIGHT: 275 LBS

## 2021-08-04 DIAGNOSIS — S91.302A NON HEALING LEFT HEEL WOUND: ICD-10-CM

## 2021-08-04 DIAGNOSIS — I73.9 PVD (PERIPHERAL VASCULAR DISEASE): Primary | ICD-10-CM

## 2021-08-04 PROCEDURE — 99213 PR OFFICE/OUTPT VISIT, EST, LEVL III, 20-29 MIN: ICD-10-PCS | Mod: S$GLB,,, | Performed by: PODIATRIST

## 2021-08-04 PROCEDURE — 99212 OFFICE O/P EST SF 10 MIN: CPT | Mod: PBBFAC | Performed by: PODIATRIST

## 2021-08-04 PROCEDURE — 99999 PR PBB SHADOW E&M-EST. PATIENT-LVL II: CPT | Mod: PBBFAC,,, | Performed by: PODIATRIST

## 2021-08-04 PROCEDURE — 99999 PR PBB SHADOW E&M-EST. PATIENT-LVL II: ICD-10-PCS | Mod: PBBFAC,,, | Performed by: PODIATRIST

## 2021-08-04 PROCEDURE — 99213 OFFICE O/P EST LOW 20 MIN: CPT | Mod: S$GLB,,, | Performed by: PODIATRIST

## 2021-08-11 ENCOUNTER — EXTERNAL HOME HEALTH (OUTPATIENT)
Dept: HOME HEALTH SERVICES | Facility: HOSPITAL | Age: 71
End: 2021-08-11
Payer: MEDICARE

## 2021-08-18 ENCOUNTER — OFFICE VISIT (OUTPATIENT)
Dept: PODIATRY | Facility: CLINIC | Age: 71
End: 2021-08-18
Payer: MEDICARE

## 2021-08-18 VITALS — HEIGHT: 69 IN | RESPIRATION RATE: 18 BRPM | BODY MASS INDEX: 40.73 KG/M2 | WEIGHT: 275 LBS

## 2021-08-18 DIAGNOSIS — S91.302A NON HEALING LEFT HEEL WOUND: ICD-10-CM

## 2021-08-18 DIAGNOSIS — I73.9 PVD (PERIPHERAL VASCULAR DISEASE): Primary | ICD-10-CM

## 2021-08-18 DIAGNOSIS — I73.9 PERIPHERAL ARTERIAL DISEASE: ICD-10-CM

## 2021-08-18 PROCEDURE — 4010F ACE/ARB THERAPY RXD/TAKEN: CPT | Mod: CPTII,S$GLB,, | Performed by: PODIATRIST

## 2021-08-18 PROCEDURE — 3008F BODY MASS INDEX DOCD: CPT | Mod: CPTII,S$GLB,, | Performed by: PODIATRIST

## 2021-08-18 PROCEDURE — 1159F PR MEDICATION LIST DOCUMENTED IN MEDICAL RECORD: ICD-10-PCS | Mod: CPTII,S$GLB,, | Performed by: PODIATRIST

## 2021-08-18 PROCEDURE — 3008F PR BODY MASS INDEX (BMI) DOCUMENTED: ICD-10-PCS | Mod: CPTII,S$GLB,, | Performed by: PODIATRIST

## 2021-08-18 PROCEDURE — 3051F HG A1C>EQUAL 7.0%<8.0%: CPT | Mod: CPTII,S$GLB,, | Performed by: PODIATRIST

## 2021-08-18 PROCEDURE — 99999 PR PBB SHADOW E&M-EST. PATIENT-LVL IV: CPT | Mod: PBBFAC,,, | Performed by: PODIATRIST

## 2021-08-18 PROCEDURE — 99213 OFFICE O/P EST LOW 20 MIN: CPT | Mod: S$GLB,,, | Performed by: PODIATRIST

## 2021-08-18 PROCEDURE — 1125F AMNT PAIN NOTED PAIN PRSNT: CPT | Mod: CPTII,S$GLB,, | Performed by: PODIATRIST

## 2021-08-18 PROCEDURE — 1125F PR PAIN SEVERITY QUANTIFIED, PAIN PRESENT: ICD-10-PCS | Mod: CPTII,S$GLB,, | Performed by: PODIATRIST

## 2021-08-18 PROCEDURE — 3051F PR MOST RECENT HEMOGLOBIN A1C LEVEL 7.0 - < 8.0%: ICD-10-PCS | Mod: CPTII,S$GLB,, | Performed by: PODIATRIST

## 2021-08-18 PROCEDURE — 99213 PR OFFICE/OUTPT VISIT, EST, LEVL III, 20-29 MIN: ICD-10-PCS | Mod: S$GLB,,, | Performed by: PODIATRIST

## 2021-08-18 PROCEDURE — 1159F MED LIST DOCD IN RCRD: CPT | Mod: CPTII,S$GLB,, | Performed by: PODIATRIST

## 2021-08-18 PROCEDURE — 99999 PR PBB SHADOW E&M-EST. PATIENT-LVL IV: ICD-10-PCS | Mod: PBBFAC,,, | Performed by: PODIATRIST

## 2021-08-18 PROCEDURE — 4010F PR ACE/ARB THEARPY RXD/TAKEN: ICD-10-PCS | Mod: CPTII,S$GLB,, | Performed by: PODIATRIST

## 2021-08-20 ENCOUNTER — TELEPHONE (OUTPATIENT)
Dept: WOUND CARE | Facility: CLINIC | Age: 71
End: 2021-08-20

## 2021-08-24 ENCOUNTER — TELEPHONE (OUTPATIENT)
Dept: PODIATRY | Facility: CLINIC | Age: 71
End: 2021-08-24

## 2021-09-23 ENCOUNTER — TELEPHONE (OUTPATIENT)
Dept: PODIATRY | Facility: CLINIC | Age: 71
End: 2021-09-23

## 2021-09-28 ENCOUNTER — EXTERNAL HOME HEALTH (OUTPATIENT)
Dept: HOME HEALTH SERVICES | Facility: HOSPITAL | Age: 71
End: 2021-09-28
Payer: MEDICARE

## 2021-10-02 PROCEDURE — G0179 PR HOME HEALTH MD RECERTIFICATION: ICD-10-PCS | Mod: ,,, | Performed by: PODIATRIST

## 2021-10-02 PROCEDURE — G0179 MD RECERTIFICATION HHA PT: HCPCS | Mod: ,,, | Performed by: PODIATRIST

## 2021-10-06 ENCOUNTER — OFFICE VISIT (OUTPATIENT)
Dept: PODIATRY | Facility: CLINIC | Age: 71
End: 2021-10-06
Payer: MEDICARE

## 2021-10-06 VITALS — BODY MASS INDEX: 40.6 KG/M2 | WEIGHT: 274.94 LBS

## 2021-10-06 DIAGNOSIS — S91.302A NON HEALING LEFT HEEL WOUND: ICD-10-CM

## 2021-10-06 DIAGNOSIS — I73.9 PVD (PERIPHERAL VASCULAR DISEASE): Primary | ICD-10-CM

## 2021-10-06 PROCEDURE — 99213 OFFICE O/P EST LOW 20 MIN: CPT | Mod: S$GLB,,, | Performed by: PODIATRIST

## 2021-10-06 PROCEDURE — 3008F BODY MASS INDEX DOCD: CPT | Mod: CPTII,S$GLB,, | Performed by: PODIATRIST

## 2021-10-06 PROCEDURE — 3008F PR BODY MASS INDEX (BMI) DOCUMENTED: ICD-10-PCS | Mod: CPTII,S$GLB,, | Performed by: PODIATRIST

## 2021-10-06 PROCEDURE — 3051F PR MOST RECENT HEMOGLOBIN A1C LEVEL 7.0 - < 8.0%: ICD-10-PCS | Mod: CPTII,S$GLB,, | Performed by: PODIATRIST

## 2021-10-06 PROCEDURE — 99999 PR PBB SHADOW E&M-EST. PATIENT-LVL III: ICD-10-PCS | Mod: PBBFAC,,, | Performed by: PODIATRIST

## 2021-10-06 PROCEDURE — 99999 PR PBB SHADOW E&M-EST. PATIENT-LVL III: CPT | Mod: PBBFAC,,, | Performed by: PODIATRIST

## 2021-10-06 PROCEDURE — 4010F ACE/ARB THERAPY RXD/TAKEN: CPT | Mod: CPTII,S$GLB,, | Performed by: PODIATRIST

## 2021-10-06 PROCEDURE — 99213 PR OFFICE/OUTPT VISIT, EST, LEVL III, 20-29 MIN: ICD-10-PCS | Mod: S$GLB,,, | Performed by: PODIATRIST

## 2021-10-06 PROCEDURE — 3051F HG A1C>EQUAL 7.0%<8.0%: CPT | Mod: CPTII,S$GLB,, | Performed by: PODIATRIST

## 2021-10-06 PROCEDURE — 4010F PR ACE/ARB THEARPY RXD/TAKEN: ICD-10-PCS | Mod: CPTII,S$GLB,, | Performed by: PODIATRIST

## 2021-10-06 PROCEDURE — 1125F PR PAIN SEVERITY QUANTIFIED, PAIN PRESENT: ICD-10-PCS | Mod: CPTII,S$GLB,, | Performed by: PODIATRIST

## 2021-10-06 PROCEDURE — 1125F AMNT PAIN NOTED PAIN PRSNT: CPT | Mod: CPTII,S$GLB,, | Performed by: PODIATRIST

## 2021-10-11 ENCOUNTER — EXTERNAL HOME HEALTH (OUTPATIENT)
Dept: HOME HEALTH SERVICES | Facility: HOSPITAL | Age: 71
End: 2021-10-11
Payer: MEDICARE

## 2021-10-19 ENCOUNTER — OFFICE VISIT (OUTPATIENT)
Dept: PODIATRY | Facility: CLINIC | Age: 71
End: 2021-10-19
Payer: MEDICARE

## 2021-10-19 VITALS
WEIGHT: 274.94 LBS | HEART RATE: 43 BPM | SYSTOLIC BLOOD PRESSURE: 118 MMHG | DIASTOLIC BLOOD PRESSURE: 59 MMHG | BODY MASS INDEX: 40.6 KG/M2

## 2021-10-19 DIAGNOSIS — E11.49 TYPE II DIABETES MELLITUS WITH NEUROLOGICAL MANIFESTATIONS: Primary | ICD-10-CM

## 2021-10-19 DIAGNOSIS — L97.422 HEEL ULCER, LEFT, WITH FAT LAYER EXPOSED: ICD-10-CM

## 2021-10-19 DIAGNOSIS — L97.922 LEG ULCER, LEFT, WITH FAT LAYER EXPOSED: ICD-10-CM

## 2021-10-19 PROCEDURE — 3074F SYST BP LT 130 MM HG: CPT | Mod: CPTII,S$GLB,, | Performed by: PODIATRIST

## 2021-10-19 PROCEDURE — 1125F AMNT PAIN NOTED PAIN PRSNT: CPT | Mod: CPTII,S$GLB,, | Performed by: PODIATRIST

## 2021-10-19 PROCEDURE — 3078F PR MOST RECENT DIASTOLIC BLOOD PRESSURE < 80 MM HG: ICD-10-PCS | Mod: CPTII,S$GLB,, | Performed by: PODIATRIST

## 2021-10-19 PROCEDURE — 4010F ACE/ARB THERAPY RXD/TAKEN: CPT | Mod: CPTII,S$GLB,, | Performed by: PODIATRIST

## 2021-10-19 PROCEDURE — 3078F DIAST BP <80 MM HG: CPT | Mod: CPTII,S$GLB,, | Performed by: PODIATRIST

## 2021-10-19 PROCEDURE — 1159F PR MEDICATION LIST DOCUMENTED IN MEDICAL RECORD: ICD-10-PCS | Mod: CPTII,S$GLB,, | Performed by: PODIATRIST

## 2021-10-19 PROCEDURE — 3008F PR BODY MASS INDEX (BMI) DOCUMENTED: ICD-10-PCS | Mod: CPTII,S$GLB,, | Performed by: PODIATRIST

## 2021-10-19 PROCEDURE — 11045 WOUND DEBRIDEMENT: ICD-10-PCS | Mod: S$GLB,,, | Performed by: PODIATRIST

## 2021-10-19 PROCEDURE — 99499 UNLISTED E&M SERVICE: CPT | Mod: S$GLB,,, | Performed by: PODIATRIST

## 2021-10-19 PROCEDURE — 99499 NO LOS: ICD-10-PCS | Mod: S$GLB,,, | Performed by: PODIATRIST

## 2021-10-19 PROCEDURE — 3008F BODY MASS INDEX DOCD: CPT | Mod: CPTII,S$GLB,, | Performed by: PODIATRIST

## 2021-10-19 PROCEDURE — 1159F MED LIST DOCD IN RCRD: CPT | Mod: CPTII,S$GLB,, | Performed by: PODIATRIST

## 2021-10-19 PROCEDURE — 99999 PR PBB SHADOW E&M-EST. PATIENT-LVL IV: CPT | Mod: PBBFAC,,, | Performed by: PODIATRIST

## 2021-10-19 PROCEDURE — 99999 PR PBB SHADOW E&M-EST. PATIENT-LVL IV: ICD-10-PCS | Mod: PBBFAC,,, | Performed by: PODIATRIST

## 2021-10-19 PROCEDURE — 11042 DBRDMT SUBQ TIS 1ST 20SQCM/<: CPT | Mod: S$GLB,,, | Performed by: PODIATRIST

## 2021-10-19 PROCEDURE — 1160F RVW MEDS BY RX/DR IN RCRD: CPT | Mod: CPTII,S$GLB,, | Performed by: PODIATRIST

## 2021-10-19 PROCEDURE — 11045 DBRDMT SUBQ TISS EACH ADDL: CPT | Mod: S$GLB,,, | Performed by: PODIATRIST

## 2021-10-19 PROCEDURE — 11042 WOUND DEBRIDEMENT: ICD-10-PCS | Mod: S$GLB,,, | Performed by: PODIATRIST

## 2021-10-19 PROCEDURE — 4010F PR ACE/ARB THEARPY RXD/TAKEN: ICD-10-PCS | Mod: CPTII,S$GLB,, | Performed by: PODIATRIST

## 2021-10-19 PROCEDURE — 1160F PR REVIEW ALL MEDS BY PRESCRIBER/CLIN PHARMACIST DOCUMENTED: ICD-10-PCS | Mod: CPTII,S$GLB,, | Performed by: PODIATRIST

## 2021-10-19 PROCEDURE — 3074F PR MOST RECENT SYSTOLIC BLOOD PRESSURE < 130 MM HG: ICD-10-PCS | Mod: CPTII,S$GLB,, | Performed by: PODIATRIST

## 2021-10-19 PROCEDURE — 1125F PR PAIN SEVERITY QUANTIFIED, PAIN PRESENT: ICD-10-PCS | Mod: CPTII,S$GLB,, | Performed by: PODIATRIST

## 2021-11-16 ENCOUNTER — OFFICE VISIT (OUTPATIENT)
Dept: PODIATRY | Facility: CLINIC | Age: 71
End: 2021-11-16
Payer: MEDICARE

## 2021-11-16 DIAGNOSIS — L97.922 LEG ULCER, LEFT, WITH FAT LAYER EXPOSED: ICD-10-CM

## 2021-11-16 DIAGNOSIS — L97.422 HEEL ULCER, LEFT, WITH FAT LAYER EXPOSED: ICD-10-CM

## 2021-11-16 DIAGNOSIS — E11.49 TYPE II DIABETES MELLITUS WITH NEUROLOGICAL MANIFESTATIONS: Primary | ICD-10-CM

## 2021-11-16 PROCEDURE — 4010F PR ACE/ARB THEARPY RXD/TAKEN: ICD-10-PCS | Mod: CPTII,S$GLB,, | Performed by: PODIATRIST

## 2021-11-16 PROCEDURE — 99213 PR OFFICE/OUTPT VISIT, EST, LEVL III, 20-29 MIN: ICD-10-PCS | Mod: S$GLB,,, | Performed by: PODIATRIST

## 2021-11-16 PROCEDURE — 4010F ACE/ARB THERAPY RXD/TAKEN: CPT | Mod: CPTII,S$GLB,, | Performed by: PODIATRIST

## 2021-11-16 PROCEDURE — 99213 OFFICE O/P EST LOW 20 MIN: CPT | Mod: S$GLB,,, | Performed by: PODIATRIST

## 2021-11-17 ENCOUNTER — EXTERNAL HOME HEALTH (OUTPATIENT)
Dept: HOME HEALTH SERVICES | Facility: HOSPITAL | Age: 71
End: 2021-11-17
Payer: MEDICARE

## 2021-11-30 ENCOUNTER — OFFICE VISIT (OUTPATIENT)
Dept: PODIATRY | Facility: CLINIC | Age: 71
End: 2021-11-30
Payer: MEDICARE

## 2021-11-30 VITALS — BODY MASS INDEX: 40.88 KG/M2 | RESPIRATION RATE: 18 BRPM | WEIGHT: 276 LBS | HEIGHT: 69 IN

## 2021-11-30 DIAGNOSIS — E11.49 TYPE II DIABETES MELLITUS WITH NEUROLOGICAL MANIFESTATIONS: Primary | ICD-10-CM

## 2021-11-30 DIAGNOSIS — L97.922 LEG ULCER, LEFT, WITH FAT LAYER EXPOSED: ICD-10-CM

## 2021-11-30 DIAGNOSIS — L97.422 HEEL ULCER, LEFT, WITH FAT LAYER EXPOSED: ICD-10-CM

## 2021-11-30 PROCEDURE — 4010F ACE/ARB THERAPY RXD/TAKEN: CPT | Mod: CPTII,S$GLB,, | Performed by: PODIATRIST

## 2021-11-30 PROCEDURE — 99213 OFFICE O/P EST LOW 20 MIN: CPT | Mod: S$GLB,,, | Performed by: PODIATRIST

## 2021-11-30 PROCEDURE — 4010F PR ACE/ARB THEARPY RXD/TAKEN: ICD-10-PCS | Mod: CPTII,S$GLB,, | Performed by: PODIATRIST

## 2021-11-30 PROCEDURE — 99213 PR OFFICE/OUTPT VISIT, EST, LEVL III, 20-29 MIN: ICD-10-PCS | Mod: S$GLB,,, | Performed by: PODIATRIST

## 2021-11-30 PROCEDURE — 99999 PR PBB SHADOW E&M-EST. PATIENT-LVL IV: ICD-10-PCS | Mod: PBBFAC,,, | Performed by: PODIATRIST

## 2021-11-30 PROCEDURE — 99999 PR PBB SHADOW E&M-EST. PATIENT-LVL IV: CPT | Mod: PBBFAC,,, | Performed by: PODIATRIST

## 2021-12-01 ENCOUNTER — EXTERNAL HOME HEALTH (OUTPATIENT)
Dept: HOME HEALTH SERVICES | Facility: HOSPITAL | Age: 71
End: 2021-12-01
Payer: MEDICARE

## 2021-12-27 ENCOUNTER — TELEPHONE (OUTPATIENT)
Dept: PODIATRY | Facility: CLINIC | Age: 71
End: 2021-12-27
Payer: MEDICARE

## 2022-01-04 ENCOUNTER — OFFICE VISIT (OUTPATIENT)
Dept: PODIATRY | Facility: CLINIC | Age: 72
End: 2022-01-04
Payer: MEDICARE

## 2022-01-04 VITALS — BODY MASS INDEX: 40.76 KG/M2 | HEIGHT: 69 IN

## 2022-01-04 DIAGNOSIS — I73.9 PVD (PERIPHERAL VASCULAR DISEASE): ICD-10-CM

## 2022-01-04 DIAGNOSIS — L97.922 LEG ULCER, LEFT, WITH FAT LAYER EXPOSED: ICD-10-CM

## 2022-01-04 DIAGNOSIS — E11.49 TYPE II DIABETES MELLITUS WITH NEUROLOGICAL MANIFESTATIONS: Primary | ICD-10-CM

## 2022-01-04 DIAGNOSIS — L97.422 HEEL ULCER, LEFT, WITH FAT LAYER EXPOSED: ICD-10-CM

## 2022-01-04 PROCEDURE — 99213 OFFICE O/P EST LOW 20 MIN: CPT | Mod: S$GLB,,, | Performed by: PODIATRIST

## 2022-01-04 PROCEDURE — 99999 PR PBB SHADOW E&M-EST. PATIENT-LVL II: CPT | Mod: PBBFAC,,, | Performed by: PODIATRIST

## 2022-01-04 PROCEDURE — 1125F PR PAIN SEVERITY QUANTIFIED, PAIN PRESENT: ICD-10-PCS | Mod: CPTII,S$GLB,, | Performed by: PODIATRIST

## 2022-01-04 PROCEDURE — 1125F AMNT PAIN NOTED PAIN PRSNT: CPT | Mod: CPTII,S$GLB,, | Performed by: PODIATRIST

## 2022-01-04 PROCEDURE — 3008F BODY MASS INDEX DOCD: CPT | Mod: CPTII,S$GLB,, | Performed by: PODIATRIST

## 2022-01-04 PROCEDURE — 3008F PR BODY MASS INDEX (BMI) DOCUMENTED: ICD-10-PCS | Mod: CPTII,S$GLB,, | Performed by: PODIATRIST

## 2022-01-04 PROCEDURE — 99213 PR OFFICE/OUTPT VISIT, EST, LEVL III, 20-29 MIN: ICD-10-PCS | Mod: S$GLB,,, | Performed by: PODIATRIST

## 2022-01-04 PROCEDURE — 99999 PR PBB SHADOW E&M-EST. PATIENT-LVL II: ICD-10-PCS | Mod: PBBFAC,,, | Performed by: PODIATRIST

## 2022-01-04 RX ORDER — APIXABAN 5 MG/1
TABLET, FILM COATED ORAL
Status: ON HOLD | COMMUNITY
Start: 2021-10-14 | End: 2022-06-20

## 2022-01-04 RX ORDER — ERGOCALCIFEROL 1.25 MG/1
50000 CAPSULE ORAL
Status: ON HOLD | COMMUNITY
Start: 2021-08-12 | End: 2022-06-20

## 2022-01-04 RX ORDER — METFORMIN HYDROCHLORIDE 500 MG/1
500 TABLET, EXTENDED RELEASE ORAL DAILY
Status: ON HOLD | COMMUNITY
Start: 2021-10-06 | End: 2022-06-24 | Stop reason: HOSPADM

## 2022-01-20 NOTE — PROGRESS NOTES
Subjective:      Patient ID: Saji Castañeda is a 71 y.o. male.    Chief Complaint: Diabetes Mellitus    Saji is a 71 y.o. male who presents to the clinic for evaluation and treatment of high risk feet. Saji has a past medical history of Arthritis, Diabetes mellitus, Diabetes mellitus, type 2, Hyperlipidemia, and Osteomyelitis. The patient's chief complaint is diabetic foot ulcer, L heel and leg . This patient has documented high risk feet requiring routine maintenance secondary to peripheral neuropathy.    PCP: Serge Holland MD    Date Last Seen by PCP: Patient does not have a PCP or has not yet seen their PCP      Current shoe gear:  Affected Foot: Football and Darco shoe on the affected foot     Unaffected Foot: Darco shoe with no sock or football/dressing.    History of Trauma: negative  Sign of Infection: none    Hemoglobin A1C   Date Value Ref Range Status   12/08/2020 7.9 (H) 4.0 - 5.6 % Final     Comment:     ADA Screening Guidelines:  5.7-6.4%  Consistent with prediabetes  >or=6.5%  Consistent with diabetes  High levels of fetal hemoglobin interfere with the HbA1C  assay. Heterozygous hemoglobin variants (HbS, HgC, etc)do  not significantly interfere with this assay.   However, presence of multiple variants may affect accuracy.     12/05/2019 9.8 (H) 4.0 - 5.6 % Final     Comment:     ADA Screening Guidelines:  5.7-6.4%  Consistent with prediabetes  >or=6.5%  Consistent with diabetes  High levels of fetal hemoglobin interfere with the HbA1C  assay. Heterozygous hemoglobin variants (HbS, HgC, etc)do  not significantly interfere with this assay.   However, presence of multiple variants may affect accuracy.     10/15/2018 7.7 (H) 4.0 - 5.6 % Final     Comment:     ADA Screening Guidelines:  5.7-6.4%  Consistent with prediabetes  >or=6.5%  Consistent with diabetes  High levels of fetal hemoglobin interfere with the HbA1C  assay. Heterozygous hemoglobin variants (HbS, HgC, etc)do  not  significantly interfere with this assay.   However, presence of multiple variants may affect accuracy.         Review of Systems   Constitutional: Negative for chills and fever.   Cardiovascular: Positive for leg swelling. Negative for chest pain and claudication.   Respiratory: Negative for cough and shortness of breath.    Skin: Positive for dry skin, nail changes (R foot only. L foot amputation) and poor wound healing (L leg and heel ulcers). Negative for flushing and itching.   Musculoskeletal: Negative for back pain.        L foot amputation (Choparts)    Gastrointestinal: Negative for nausea and vomiting.   Neurological: Positive for numbness and sensory change. Negative for paresthesias.   Psychiatric/Behavioral: Negative for altered mental status.           Objective:      Physical Exam  Vitals reviewed.   Constitutional:       Appearance: He is well-developed.   HENT:      Head: Normocephalic.   Cardiovascular:      Comments: DP/PT pulses diminished b/l CRT < 3 sec to tips of toes.    Pulmonary:      Effort: No respiratory distress.   Musculoskeletal:      Right lower le+ Edema present.      Left lower le+ Edema present.      Comments: L foot chopart's amputation.      Skin:     General: Skin is warm and dry.      Findings: No erythema.      Comments: Overall thickening of skin of b/l legs and ankles  Mild hyperpigmentation to skin of both ankles and/or feet, consistent with hemosiderin deposits.   Diminished pedal hair b/l.   Interdigital spaces clean, dry and intact .   No erythema noted to R foot or L leg.        Ulcer L heel: 4.9 x 2.7 x 0.2cm   Depth: subcutaneous tissue layer  Periphery: hyperkeratotic rim  Base: granular with biofilm  Drainage: Serosanguinous  SOI: none    Ulcer anterior L mid leg: 5.5 x 5.0 x 0.1cm   Depth: subcutaneous tissue layer  Periphery: rolled in, slightly hyperkeratotic rim  Base: granular with biofilm  Drainage: Serosanguinous  SOI: none    Ulcer Posterior L mid  leg: 0.5 x 0.3x 0.1cm  Depth: subcutaneous tissue layer  Periphery: hyperkeratotic rim  Base: granular with biofilm  Drainage: Serosanguinous  SOI: none   Neurological:      Mental Status: He is alert and oriented to person, place, and time.      Sensory: Sensory deficit present.      Comments: Light touch, proprioception, and sharp/dull sensation are all intact bilaterally. Protective threshold with the [place]-Wienstein monofilament is diminished bilaterally.    Psychiatric:         Behavior: Behavior normal.         Thought Content: Thought content normal.         Judgment: Judgment normal.               Assessment:       Encounter Diagnoses   Name Primary?    Type II diabetes mellitus with neurological manifestations Yes    Heel ulcer, left, with fat layer exposed     Leg ulcer, left, with fat layer exposed     PVD (peripheral vascular disease)          Plan:       Saji was seen today for diabetes mellitus.    Diagnoses and all orders for this visit:    Type II diabetes mellitus with neurological manifestations    Heel ulcer, left, with fat layer exposed    Leg ulcer, left, with fat layer exposed    PVD (peripheral vascular disease)      I counseled the patient on his conditions, their implications and medical management.    L heel and leg ulcers assessed and examined.     area deeply cleansed with saline moistened gauze     Applied Shelly to all wounds, covered with wound foam     No current SOI. Continue HH as ordered .     RTC 2 weeks, sooner PRN

## 2022-01-25 ENCOUNTER — OFFICE VISIT (OUTPATIENT)
Dept: PODIATRY | Facility: CLINIC | Age: 72
End: 2022-01-25
Payer: MEDICARE

## 2022-01-25 VITALS
RESPIRATION RATE: 18 BRPM | HEART RATE: 40 BPM | DIASTOLIC BLOOD PRESSURE: 50 MMHG | BODY MASS INDEX: 40.88 KG/M2 | WEIGHT: 276 LBS | SYSTOLIC BLOOD PRESSURE: 150 MMHG | HEIGHT: 69 IN

## 2022-01-25 DIAGNOSIS — L97.922 LEG ULCER, LEFT, WITH FAT LAYER EXPOSED: ICD-10-CM

## 2022-01-25 DIAGNOSIS — S91.302A NON HEALING LEFT HEEL WOUND: ICD-10-CM

## 2022-01-25 DIAGNOSIS — I73.9 PERIPHERAL ARTERIAL DISEASE: ICD-10-CM

## 2022-01-25 DIAGNOSIS — I73.9 PVD (PERIPHERAL VASCULAR DISEASE): ICD-10-CM

## 2022-01-25 DIAGNOSIS — S80.821A BLISTER OF RIGHT LOWER EXTREMITY, INITIAL ENCOUNTER: ICD-10-CM

## 2022-01-25 DIAGNOSIS — E11.49 TYPE II DIABETES MELLITUS WITH NEUROLOGICAL MANIFESTATIONS: Primary | ICD-10-CM

## 2022-01-25 PROCEDURE — 1159F PR MEDICATION LIST DOCUMENTED IN MEDICAL RECORD: ICD-10-PCS | Mod: CPTII,S$GLB,, | Performed by: PODIATRIST

## 2022-01-25 PROCEDURE — 3078F PR MOST RECENT DIASTOLIC BLOOD PRESSURE < 80 MM HG: ICD-10-PCS | Mod: CPTII,S$GLB,, | Performed by: PODIATRIST

## 2022-01-25 PROCEDURE — 99214 PR OFFICE/OUTPT VISIT, EST, LEVL IV, 30-39 MIN: ICD-10-PCS | Mod: 25,S$GLB,, | Performed by: PODIATRIST

## 2022-01-25 PROCEDURE — 1125F PR PAIN SEVERITY QUANTIFIED, PAIN PRESENT: ICD-10-PCS | Mod: CPTII,S$GLB,, | Performed by: PODIATRIST

## 2022-01-25 PROCEDURE — 29580 PR STRAPPING UNNA BOOT: ICD-10-PCS | Mod: 50,S$GLB,, | Performed by: PODIATRIST

## 2022-01-25 PROCEDURE — 3077F PR MOST RECENT SYSTOLIC BLOOD PRESSURE >= 140 MM HG: ICD-10-PCS | Mod: CPTII,S$GLB,, | Performed by: PODIATRIST

## 2022-01-25 PROCEDURE — 3077F SYST BP >= 140 MM HG: CPT | Mod: CPTII,S$GLB,, | Performed by: PODIATRIST

## 2022-01-25 PROCEDURE — 3008F PR BODY MASS INDEX (BMI) DOCUMENTED: ICD-10-PCS | Mod: CPTII,S$GLB,, | Performed by: PODIATRIST

## 2022-01-25 PROCEDURE — 3078F DIAST BP <80 MM HG: CPT | Mod: CPTII,S$GLB,, | Performed by: PODIATRIST

## 2022-01-25 PROCEDURE — 99999 PR PBB SHADOW E&M-EST. PATIENT-LVL IV: CPT | Mod: PBBFAC,,, | Performed by: PODIATRIST

## 2022-01-25 PROCEDURE — 29580 STRAPPING UNNA BOOT: CPT | Mod: 50,S$GLB,, | Performed by: PODIATRIST

## 2022-01-25 PROCEDURE — 99999 PR PBB SHADOW E&M-EST. PATIENT-LVL IV: ICD-10-PCS | Mod: PBBFAC,,, | Performed by: PODIATRIST

## 2022-01-25 PROCEDURE — 1125F AMNT PAIN NOTED PAIN PRSNT: CPT | Mod: CPTII,S$GLB,, | Performed by: PODIATRIST

## 2022-01-25 PROCEDURE — 1159F MED LIST DOCD IN RCRD: CPT | Mod: CPTII,S$GLB,, | Performed by: PODIATRIST

## 2022-01-25 PROCEDURE — 99214 OFFICE O/P EST MOD 30 MIN: CPT | Mod: 25,S$GLB,, | Performed by: PODIATRIST

## 2022-01-25 PROCEDURE — 3008F BODY MASS INDEX DOCD: CPT | Mod: CPTII,S$GLB,, | Performed by: PODIATRIST

## 2022-01-26 ENCOUNTER — TELEPHONE (OUTPATIENT)
Dept: PODIATRY | Facility: CLINIC | Age: 72
End: 2022-01-26
Payer: MEDICARE

## 2022-01-27 ENCOUNTER — TELEPHONE (OUTPATIENT)
Dept: PODIATRY | Facility: CLINIC | Age: 72
End: 2022-01-27
Payer: MEDICARE

## 2022-01-28 ENCOUNTER — TELEPHONE (OUTPATIENT)
Dept: PODIATRY | Facility: CLINIC | Age: 72
End: 2022-01-28
Payer: MEDICARE

## 2022-01-28 NOTE — TELEPHONE ENCOUNTER
----- Message from Barb Elkins sent at 1/28/2022 10:01 AM CST -----      Please reach out to [facility] regarding above patient, they need to discuss with provider at your earliest convenience      [phone] (Calista)

## 2022-01-28 NOTE — TELEPHONE ENCOUNTER
Pt is no longer with [facility] he is now with [facility]. Message relayed to [facility].

## 2022-01-30 PROCEDURE — G0179 PR HOME HEALTH MD RECERTIFICATION: ICD-10-PCS | Mod: ,,, | Performed by: PODIATRIST

## 2022-01-30 PROCEDURE — G0179 MD RECERTIFICATION HHA PT: HCPCS | Mod: ,,, | Performed by: PODIATRIST

## 2022-02-01 ENCOUNTER — TELEPHONE (OUTPATIENT)
Dept: PODIATRY | Facility: CLINIC | Age: 72
End: 2022-02-01
Payer: MEDICARE

## 2022-02-01 NOTE — TELEPHONE ENCOUNTER
Spoke with rep from [facility] to explain that patient has HH and they are responsible to provide dressing supplies.  Our providers do not sign for additional dressing supplies

## 2022-02-01 NOTE — TELEPHONE ENCOUNTER
----- Message from Vickie Garg LPN sent at 2/1/2022  4:04 PM CST -----  Regarding: FW: speak with nurse  Contact: mariam Castellanos afternoon,    This message was sent in error to our office - this patient is currently seeing Dr. Kenyon in podiatry.  Saji Castañeda - MRN [id number].  See message below    Thank you,  Vickie Garg LPN  [facility]    ----- Message -----  From: Anaya Nicholson  Sent: 2/1/2022   3:52 PM CST  To: [facility] Staff  Subject: speak with nurse                                 please mariam arceo from [facility] [phone] ref#[id number]  calling concerning a standard written form that was sent over in January they have never heard back from the office can someone please call them thanks.

## 2022-02-02 NOTE — PROGRESS NOTES
Subjective:      Patient ID: Saji Castañeda is a 71 y.o. male.    Chief Complaint: Follow-up (Wound care ), Foot Ulcer (Left leg ), Foot Problem (Rt leg blisters and wound ), and Foot Pain    Saji is a 71 y.o. male who presents to the clinic for evaluation and treatment of high risk feet. Saji has a past medical history of Arthritis, Diabetes mellitus, Diabetes mellitus, type 2, Hyperlipidemia, and Osteomyelitis. The patient's chief complaint is diabetic foot ulcer, L heel and leg . This patient has documented high risk feet requiring routine maintenance secondary to peripheral neuropathy.    PCP: Serge Holland MD    Date Last Seen by PCP: Patient does not have a PCP or has not yet seen their PCP     Chief Complaint   Patient presents with    Follow-up     Wound care     Foot Ulcer     Left leg     Foot Problem     Rt leg blisters and wound     Foot Pain     History of Trauma: negative  Sign of Infection: none    Hemoglobin A1C   Date Value Ref Range Status   12/08/2020 7.9 (H) 4.0 - 5.6 % Final     Comment:     ADA Screening Guidelines:  5.7-6.4%  Consistent with prediabetes  >or=6.5%  Consistent with diabetes  High levels of fetal hemoglobin interfere with the HbA1C  assay. Heterozygous hemoglobin variants (HbS, HgC, etc)do  not significantly interfere with this assay.   However, presence of multiple variants may affect accuracy.     12/05/2019 9.8 (H) 4.0 - 5.6 % Final     Comment:     ADA Screening Guidelines:  5.7-6.4%  Consistent with prediabetes  >or=6.5%  Consistent with diabetes  High levels of fetal hemoglobin interfere with the HbA1C  assay. Heterozygous hemoglobin variants (HbS, HgC, etc)do  not significantly interfere with this assay.   However, presence of multiple variants may affect accuracy.     10/15/2018 7.7 (H) 4.0 - 5.6 % Final     Comment:     ADA Screening Guidelines:  5.7-6.4%  Consistent with prediabetes  >or=6.5%  Consistent with diabetes  High levels of fetal  "hemoglobin interfere with the HbA1C  assay. Heterozygous hemoglobin variants (HbS, HgC, etc)do  not significantly interfere with this assay.   However, presence of multiple variants may affect accuracy.         Review of Systems   Constitutional: Negative for chills and fever.   Cardiovascular: Positive for leg swelling. Negative for chest pain and claudication.   Respiratory: Negative for cough and shortness of breath.    Skin: Positive for dry skin, nail changes (R foot only. L foot amputation) and poor wound healing (L leg and heel ulcers). Negative for flushing, itching and rash.   Musculoskeletal: Negative for back pain.        L foot amputation (Choparts)    Gastrointestinal: Negative for nausea and vomiting.   Neurological: Positive for numbness and sensory change. Negative for paresthesias.   Psychiatric/Behavioral: Negative for altered mental status.           Objective:       Vitals:    22 1320   BP: (!) 150/50   Pulse: (!) 40   Resp: 18   Weight: 125.2 kg (276 lb)   Height: 5' 9" (1.753 m)   PainSc:   5   PainLoc: Foot        Physical Exam  Vitals reviewed.   Constitutional:       Appearance: He is well-developed.   HENT:      Head: Normocephalic.   Cardiovascular:      Comments: DP/PT pulses diminished b/l CRT < 3 sec to tips of toes.    Pulmonary:      Effort: No respiratory distress.   Musculoskeletal:      Right lower le+ Edema present.      Left lower le+ Edema present.      Comments: L foot chopart's amputation.      Skin:     General: Skin is warm and dry.      Findings: No erythema.      Comments: Overall thickening of skin of b/l legs and ankles  Mild hyperpigmentation to skin of both ankles and/or feet, consistent with hemosiderin deposits.     Diminished pedal hair b/l.     Interdigital spaces clean, dry and intact .     No erythema noted to R foot or L leg.      Anterior R shin blister No surrounding erythema, edema, malodor, nor drainage noted       Ulcer L heel: 4.9 x 2.7 x " 0.2cm   Depth: subcutaneous tissue layer  Periphery: hyperkeratotic rim  Base: granular with biofilm  Drainage: Serosanguinous  SOI: none    Ulcer anterior L mid leg: 5.5 x 5.0 x 0.1cm   Depth: subcutaneous tissue layer  Periphery: rolled in, slightly hyperkeratotic rim  Base: granular with biofilm  Drainage: Serosanguinous  SOI: none    Ulcer Posterior L mid leg: 0.5 x 0.3x 0.1cm  Depth: subcutaneous tissue layer  Periphery: hyperkeratotic rim  Base: granular with biofilm  Drainage: Serosanguinous  SOI: none   Neurological:      Mental Status: He is alert and oriented to person, place, and time.      Sensory: Sensory deficit present.      Comments: Light touch, proprioception, and sharp/dull sensation are all intact bilaterally. Protective threshold with the [place]-Wienstein monofilament is diminished bilaterally.    Psychiatric:         Behavior: Behavior normal.         Thought Content: Thought content normal.         Judgment: Judgment normal.               Assessment:       Encounter Diagnoses   Name Primary?    Type II diabetes mellitus with neurological manifestations Yes    PVD (peripheral vascular disease)     Peripheral arterial disease     Leg ulcer, left, with fat layer exposed     Non healing left heel wound          Plan:       Saji was seen today for follow-up, foot ulcer, foot problem and foot pain.    Diagnoses and all orders for this visit:    Type II diabetes mellitus with neurological manifestations  -     WHEELCHAIR FOR HOME USE  -     SUBSEQUENT HOME HEALTH ORDERS    PVD (peripheral vascular disease)  -     WHEELCHAIR FOR HOME USE    Peripheral arterial disease  -     WHEELCHAIR FOR HOME USE  -     SUBSEQUENT HOME HEALTH ORDERS    Leg ulcer, left, with fat layer exposed  -     SUBSEQUENT HOME HEALTH ORDERS    Non healing left heel wound  -     SUBSEQUENT HOME HEALTH ORDERS      I counseled the patient on his conditions, their implications and medical management.    L heel and leg ulcers  assessed and examined.     area deeply cleansed with saline moistened gauze     New anterior R leg blister    With patient's permission, I applied an b/l Unna boot dressing (bi-layered pink Coflex brand)  using a spiral technique beginning with the foam layer followed by the cohesive bandage avoiding creases or folds.  The wrap was started behind the first metatarsal and ended below the tibial tubercle of the knee.  There was overlap of each turn half the width of the previous turn in order to better control edema. Patient tolerated well and related comfort to the b/l lower extremity .     Applied HB to all wounds,     Wounds stagnant however no SOI     Advised of importance of not using L leg to propel wheel chair , current chair is broken, new rx dispensed     No current SOI. Continue HH as ordered .     RTC 2 weeks, sooner PRN

## 2022-02-03 ENCOUNTER — TELEPHONE (OUTPATIENT)
Dept: WOUND CARE | Facility: CLINIC | Age: 72
End: 2022-02-03
Payer: MEDICARE

## 2022-02-03 NOTE — TELEPHONE ENCOUNTER
Returned call and spoke with Patel from [facility] - Patel advised wound care orders from 7/30/2021, 9/28/2021 and 11/30/2021 needs to be signed for this patient.  Provider Antonio last saw this patient on 6/09/2021.  Patient seeing [facility] [place] [place] podiatry and saw Dr. Schroeder and has a follow next week on 02/09/2022 with Dr. Schroeder.  Patel will route orders to PCP or to Dr. Schroeder for signature.

## 2022-02-03 NOTE — TELEPHONE ENCOUNTER
----- Message from Annalee Hale sent at 2/3/2022 10:13 AM CST -----  Regarding: [facility]  Regarding:[facility] called about 3 home health orders they need to be signed by  Ms. Alvarez     Requesting Call back number: [phone] Patel

## 2022-02-04 ENCOUNTER — TELEPHONE (OUTPATIENT)
Dept: PODIATRY | Facility: CLINIC | Age: 72
End: 2022-02-04
Payer: MEDICARE

## 2022-02-04 NOTE — TELEPHONE ENCOUNTER
----- Message from Barb Elkins sent at 2/4/2022 11:14 AM CST -----      [facility]    They are requesting to see if Dr. Nunez will sign home health orders that they can send. Please contact at number below if Dr. Nunez approves signing those orders    Please contact  [phone]   [facility]

## 2022-02-04 NOTE — TELEPHONE ENCOUNTER
Spoke with hermilo, original orders signed by physician at rehab facility patient was in, now he is at home and being followed for wound care here by Dr Kenyon.  Informed her that Dr Kenyon would sign subsequent home health wound care orders.

## 2022-02-08 ENCOUNTER — EXTERNAL HOME HEALTH (OUTPATIENT)
Dept: HOME HEALTH SERVICES | Facility: HOSPITAL | Age: 72
End: 2022-02-08
Payer: MEDICARE

## 2022-02-09 ENCOUNTER — OFFICE VISIT (OUTPATIENT)
Dept: PODIATRY | Facility: CLINIC | Age: 72
End: 2022-02-09
Payer: MEDICARE

## 2022-02-09 VITALS
HEART RATE: 67 BPM | DIASTOLIC BLOOD PRESSURE: 60 MMHG | HEIGHT: 69 IN | BODY MASS INDEX: 40.73 KG/M2 | SYSTOLIC BLOOD PRESSURE: 176 MMHG | RESPIRATION RATE: 18 BRPM | WEIGHT: 275 LBS

## 2022-02-09 DIAGNOSIS — L97.424 HEEL ULCER, LEFT, WITH NECROSIS OF BONE: ICD-10-CM

## 2022-02-09 DIAGNOSIS — E11.49 TYPE II DIABETES MELLITUS WITH NEUROLOGICAL MANIFESTATIONS: Primary | ICD-10-CM

## 2022-02-09 PROCEDURE — 11044 DBRDMT BONE 1ST 20 SQ CM/<: CPT | Mod: S$GLB,,, | Performed by: PODIATRIST

## 2022-02-09 PROCEDURE — 99999 PR PBB SHADOW E&M-EST. PATIENT-LVL V: ICD-10-PCS | Mod: PBBFAC,,, | Performed by: PODIATRIST

## 2022-02-09 PROCEDURE — 87077 CULTURE AEROBIC IDENTIFY: CPT | Performed by: PODIATRIST

## 2022-02-09 PROCEDURE — 99999 PR PBB SHADOW E&M-EST. PATIENT-LVL V: CPT | Mod: PBBFAC,,, | Performed by: PODIATRIST

## 2022-02-09 PROCEDURE — 88305 TISSUE EXAM BY PATHOLOGIST: CPT | Performed by: PATHOLOGY

## 2022-02-09 PROCEDURE — 1126F AMNT PAIN NOTED NONE PRSNT: CPT | Mod: CPTII,S$GLB,, | Performed by: PODIATRIST

## 2022-02-09 PROCEDURE — 87186 SC STD MICRODIL/AGAR DIL: CPT | Performed by: PODIATRIST

## 2022-02-09 PROCEDURE — 88305 TISSUE EXAM BY PATHOLOGIST: CPT | Mod: 26,,, | Performed by: PATHOLOGY

## 2022-02-09 PROCEDURE — 87075 CULTR BACTERIA EXCEPT BLOOD: CPT | Performed by: PODIATRIST

## 2022-02-09 PROCEDURE — 3077F SYST BP >= 140 MM HG: CPT | Mod: CPTII,S$GLB,, | Performed by: PODIATRIST

## 2022-02-09 PROCEDURE — 87176 TISSUE HOMOGENIZATION CULTR: CPT | Performed by: PODIATRIST

## 2022-02-09 PROCEDURE — 3008F PR BODY MASS INDEX (BMI) DOCUMENTED: ICD-10-PCS | Mod: CPTII,S$GLB,, | Performed by: PODIATRIST

## 2022-02-09 PROCEDURE — 1126F PR PAIN SEVERITY QUANTIFIED, NO PAIN PRESENT: ICD-10-PCS | Mod: CPTII,S$GLB,, | Performed by: PODIATRIST

## 2022-02-09 PROCEDURE — 87070 CULTURE OTHR SPECIMN AEROBIC: CPT | Performed by: PODIATRIST

## 2022-02-09 PROCEDURE — 3008F BODY MASS INDEX DOCD: CPT | Mod: CPTII,S$GLB,, | Performed by: PODIATRIST

## 2022-02-09 PROCEDURE — 99499 NO LOS: ICD-10-PCS | Mod: S$GLB,,, | Performed by: PODIATRIST

## 2022-02-09 PROCEDURE — 99499 UNLISTED E&M SERVICE: CPT | Mod: S$GLB,,, | Performed by: PODIATRIST

## 2022-02-09 PROCEDURE — 3077F PR MOST RECENT SYSTOLIC BLOOD PRESSURE >= 140 MM HG: ICD-10-PCS | Mod: CPTII,S$GLB,, | Performed by: PODIATRIST

## 2022-02-09 PROCEDURE — 11044 PR DEBRIDEMENT, SKIN, SUB-Q TISSUE,MUSCLE,BONE,=<20 SQ CM: ICD-10-PCS | Mod: S$GLB,,, | Performed by: PODIATRIST

## 2022-02-09 PROCEDURE — 3078F PR MOST RECENT DIASTOLIC BLOOD PRESSURE < 80 MM HG: ICD-10-PCS | Mod: CPTII,S$GLB,, | Performed by: PODIATRIST

## 2022-02-09 PROCEDURE — 88305 TISSUE EXAM BY PATHOLOGIST: ICD-10-PCS | Mod: 26,,, | Performed by: PATHOLOGY

## 2022-02-09 PROCEDURE — 3078F DIAST BP <80 MM HG: CPT | Mod: CPTII,S$GLB,, | Performed by: PODIATRIST

## 2022-02-09 RX ORDER — DOXYCYCLINE HYCLATE 100 MG
100 TABLET ORAL EVERY 12 HOURS
Qty: 20 TABLET | Refills: 0 | Status: SHIPPED | OUTPATIENT
Start: 2022-02-09 | End: 2022-02-19

## 2022-02-12 LAB
BACTERIA SPEC AEROBE CULT: ABNORMAL
BACTERIA SPEC AEROBE CULT: ABNORMAL

## 2022-02-14 ENCOUNTER — TELEPHONE (OUTPATIENT)
Dept: PODIATRY | Facility: CLINIC | Age: 72
End: 2022-02-14
Payer: MEDICARE

## 2022-02-14 LAB — BACTERIA SPEC ANAEROBE CULT: NORMAL

## 2022-02-14 RX ORDER — CIPROFLOXACIN 500 MG/1
500 TABLET ORAL EVERY 12 HOURS
Qty: 28 TABLET | Refills: 0 | Status: SHIPPED | OUTPATIENT
Start: 2022-02-14 | End: 2022-02-28

## 2022-02-14 NOTE — TELEPHONE ENCOUNTER
Left voice message for pt to give the office a call back at [phone]. Called to inform pt to stop taking Doxycycline  and start taking Cipro.

## 2022-02-15 DIAGNOSIS — L97.424 HEEL ULCER, LEFT, WITH NECROSIS OF BONE: ICD-10-CM

## 2022-02-15 DIAGNOSIS — E11.49 TYPE II DIABETES MELLITUS WITH NEUROLOGICAL MANIFESTATIONS: Primary | ICD-10-CM

## 2022-02-15 LAB
FINAL PATHOLOGIC DIAGNOSIS: NORMAL
GROSS: NORMAL
Lab: NORMAL

## 2022-02-23 ENCOUNTER — HOSPITAL ENCOUNTER (OUTPATIENT)
Dept: RADIOLOGY | Facility: HOSPITAL | Age: 72
Discharge: HOME | End: 2022-02-23
Attending: PODIATRIST
Payer: MEDICARE

## 2022-02-23 ENCOUNTER — OFFICE VISIT (OUTPATIENT)
Dept: PODIATRY | Facility: CLINIC | Age: 72
End: 2022-02-23
Payer: MEDICARE

## 2022-02-23 ENCOUNTER — OFFICE VISIT (OUTPATIENT)
Dept: INFECTIOUS DISEASES | Facility: CLINIC | Age: 72
End: 2022-02-23
Payer: MEDICARE

## 2022-02-23 VITALS
HEART RATE: 42 BPM | SYSTOLIC BLOOD PRESSURE: 178 MMHG | BODY MASS INDEX: 40.73 KG/M2 | HEIGHT: 69 IN | RESPIRATION RATE: 18 BRPM | WEIGHT: 275 LBS | DIASTOLIC BLOOD PRESSURE: 63 MMHG

## 2022-02-23 DIAGNOSIS — I73.9 PVD (PERIPHERAL VASCULAR DISEASE): ICD-10-CM

## 2022-02-23 DIAGNOSIS — E11.49 TYPE II DIABETES MELLITUS WITH NEUROLOGICAL MANIFESTATIONS: ICD-10-CM

## 2022-02-23 DIAGNOSIS — L97.222 SKIN ULCER OF LEFT CALF WITH FAT LAYER EXPOSED: ICD-10-CM

## 2022-02-23 DIAGNOSIS — M86.172 ACUTE OSTEOMYELITIS OF CALCANEUM, LEFT: Primary | ICD-10-CM

## 2022-02-23 DIAGNOSIS — L97.424 HEEL ULCER, LEFT, WITH NECROSIS OF BONE: ICD-10-CM

## 2022-02-23 DIAGNOSIS — L97.222 SKIN ULCER OF LEFT CALF WITH FAT LAYER EXPOSED: Primary | ICD-10-CM

## 2022-02-23 PROCEDURE — 73630 XR FOOT COMPLETE 3 VIEW RIGHT: ICD-10-PCS | Mod: 26,RT,, | Performed by: RADIOLOGY

## 2022-02-23 PROCEDURE — 11044 DBRDMT BONE 1ST 20 SQ CM/<: CPT | Mod: S$GLB,,, | Performed by: PODIATRIST

## 2022-02-23 PROCEDURE — 99215 PR OFFICE/OUTPT VISIT, EST, LEVL V, 40-54 MIN: ICD-10-PCS | Mod: S$GLB,,, | Performed by: INTERNAL MEDICINE

## 2022-02-23 PROCEDURE — 3008F BODY MASS INDEX DOCD: CPT | Mod: CPTII,S$GLB,, | Performed by: PODIATRIST

## 2022-02-23 PROCEDURE — 3078F PR MOST RECENT DIASTOLIC BLOOD PRESSURE < 80 MM HG: ICD-10-PCS | Mod: CPTII,S$GLB,, | Performed by: PODIATRIST

## 2022-02-23 PROCEDURE — 99215 OFFICE O/P EST HI 40 MIN: CPT | Mod: S$GLB,,, | Performed by: INTERNAL MEDICINE

## 2022-02-23 PROCEDURE — 73630 X-RAY EXAM OF FOOT: CPT | Mod: TC,RT

## 2022-02-23 PROCEDURE — 99999 PR PBB SHADOW E&M-EST. PATIENT-LVL V: ICD-10-PCS | Mod: PBBFAC,,, | Performed by: PODIATRIST

## 2022-02-23 PROCEDURE — 3078F DIAST BP <80 MM HG: CPT | Mod: CPTII,S$GLB,, | Performed by: PODIATRIST

## 2022-02-23 PROCEDURE — 99999 PR PBB SHADOW E&M-EST. PATIENT-LVL V: CPT | Mod: PBBFAC,,, | Performed by: PODIATRIST

## 2022-02-23 PROCEDURE — 11044 PR DEBRIDEMENT, SKIN, SUB-Q TISSUE,MUSCLE,BONE,=<20 SQ CM: ICD-10-PCS | Mod: S$GLB,,, | Performed by: PODIATRIST

## 2022-02-23 PROCEDURE — 1126F AMNT PAIN NOTED NONE PRSNT: CPT | Mod: CPTII,S$GLB,, | Performed by: PODIATRIST

## 2022-02-23 PROCEDURE — 3077F SYST BP >= 140 MM HG: CPT | Mod: CPTII,S$GLB,, | Performed by: PODIATRIST

## 2022-02-23 PROCEDURE — 3077F PR MOST RECENT SYSTOLIC BLOOD PRESSURE >= 140 MM HG: ICD-10-PCS | Mod: CPTII,S$GLB,, | Performed by: PODIATRIST

## 2022-02-23 PROCEDURE — 1126F PR PAIN SEVERITY QUANTIFIED, NO PAIN PRESENT: ICD-10-PCS | Mod: CPTII,S$GLB,, | Performed by: PODIATRIST

## 2022-02-23 PROCEDURE — 73590 X-RAY EXAM OF LOWER LEG: CPT | Mod: 26,LT,, | Performed by: RADIOLOGY

## 2022-02-23 PROCEDURE — 3008F PR BODY MASS INDEX (BMI) DOCUMENTED: ICD-10-PCS | Mod: CPTII,S$GLB,, | Performed by: PODIATRIST

## 2022-02-23 PROCEDURE — 99214 PR OFFICE/OUTPT VISIT, EST, LEVL IV, 30-39 MIN: ICD-10-PCS | Mod: 25,S$GLB,, | Performed by: PODIATRIST

## 2022-02-23 PROCEDURE — 73590 XR TIBIA FIBULA 2 VIEW LEFT: ICD-10-PCS | Mod: 26,LT,, | Performed by: RADIOLOGY

## 2022-02-23 PROCEDURE — 99214 OFFICE O/P EST MOD 30 MIN: CPT | Mod: 25,S$GLB,, | Performed by: PODIATRIST

## 2022-02-23 PROCEDURE — 73590 X-RAY EXAM OF LOWER LEG: CPT | Mod: TC,LT

## 2022-02-23 PROCEDURE — 73630 X-RAY EXAM OF FOOT: CPT | Mod: 26,RT,, | Performed by: RADIOLOGY

## 2022-02-23 RX ORDER — CIPROFLOXACIN 500 MG/1
500 TABLET ORAL 2 TIMES DAILY
Qty: 84 TABLET | Refills: 0 | Status: SHIPPED | OUTPATIENT
Start: 2022-02-23 | End: 2022-03-09 | Stop reason: SDUPTHER

## 2022-02-23 NOTE — PROGRESS NOTES
"Infectious Diseases Clinic Note    Subjective:       Patient ID: Saji Castañeda is a 72 y.o. male.    Chief Complaint: No chief complaint on file.    HPI    New ulcer anterior R leg, L anterior calcaneus bone exposed cultured 2/9 growing proteus mirabilis and PsA, Xray 2/23 with possible osteo of calcaneus  Past Medical History:   Diagnosis Date    Arthritis     legs    Diabetes mellitus     Diabetes mellitus, type 2     Hyperlipidemia     Osteomyelitis        Social History     Socioeconomic History    Marital status: Single   Tobacco Use    Smoking status: Never Smoker    Smokeless tobacco: Never Used   Substance and Sexual Activity    Alcohol use: No     Comment: occassional    Drug use: No    Sexual activity: Not Currently   Social History Narrative    Not currently working; lives with family         Current Outpatient Medications:     acetaminophen (TYLENOL) 325 MG tablet, Take 2 tablets (650 mg total) by mouth every 6 (six) hours as needed for Pain., Disp:  , Rfl: 0    amoxicillin-clavulanate 875-125mg (AUGMENTIN) 875-125 mg per tablet, Take 1 tablet by mouth 2 (two) times daily., Disp: 20 tablet, Rfl: 0    aspirin (ECOTRIN) 81 MG EC tablet, Take 81 mg by mouth once daily., Disp: , Rfl:     BD ULTRA-FINE ADITYA PEN NEEDLE 32 gauge x 5/32" Ndle, USE FOUR TIMES DAILY WITH MEALS AND NIGHTLY, Disp: 100 each, Rfl: 0    blood sugar diagnostic (CONTOUR TEST STRIPS) Strp, Inject 1 strip into the skin 2 (two) times daily before meals., Disp: 100 strip, Rfl: 6    ciprofloxacin HCl (CIPRO) 500 MG tablet, Take 1 tablet (500 mg total) by mouth every 12 (twelve) hours. for 14 days, Disp: 28 tablet, Rfl: 0    ELIQUIS 5 mg Tab, , Disp: , Rfl:     furosemide (LASIX) 40 MG tablet, Take 40 mg by mouth every other day., Disp: , Rfl:     gabapentin (NEURONTIN) 100 MG capsule, Take 2 capsules (200 mg total) by mouth 2 (two) times daily., Disp: 120 capsule, Rfl: 1    glipiZIDE (GLUCOTROL) 10 MG tablet, Take 10 mg " by mouth daily with breakfast. HOLD THIS MEDICATION IF YOU ARE SKIPPING A MEAL, Disp: , Rfl:     insulin aspart U-100 (NOVOLOG) 100 unit/mL (3 mL) InPn pen, Inject 12 Units into the skin 3 (three) times daily., Disp: , Rfl: 0    insulin aspart U-100 (NOVOLOG) 100 unit/mL (3 mL) InPn pen, Inject 0-5 Units into the skin before meals and at bedtime as needed (Hyperglycemia)., Disp: , Rfl: 0    insulin detemir U-100 (LEVEMIR FLEXTOUCH) 100 unit/mL (3 mL) SubQ InPn pen, Inject 55 Units into the skin every evening., Disp: , Rfl: 0    isosorbide-hydrALAZINE 20-37.5 mg (BIDIL) 20-37.5 mg Tab, Take 1 tablet by mouth 3 (three) times daily., Disp: 90 tablet, Rfl: 2    ketoconazole (NIZORAL) 2 % cream, Apply topically once daily., Disp: 60 g, Rfl: 1    LANTUS SOLOSTAR U-100 INSULIN glargine 100 units/mL (3mL) SubQ pen, , Disp: , Rfl:     losartan-hydrochlorothiazide 100-25 mg (HYZAAR) 100-25 mg per tablet, Take 1 tablet by mouth once daily., Disp: , Rfl:     melatonin (MELATIN) 3 mg tablet, Take 2 tablets (6 mg total) by mouth nightly as needed for Insomnia., Disp: , Rfl: 0    metFORMIN (GLUCOPHAGE-XR) 500 MG ER 24hr tablet, , Disp: , Rfl:     metroNIDAZOLE (FLAGYL) 500 MG tablet, Take 1 tablet (500 mg total) by mouth every 8 (eight) hours., Disp: , Rfl:     MICROLET LANCET Misc, , Disp: , Rfl: 0    multivitamin Tab, Take 1 tablet by mouth once daily., Disp:  , Rfl:     multivitamin with minerals tablet, 1 tablet 2 TIMES DAILY (route: oral), Disp: , Rfl:     pantoprazole (PROTONIX) 40 MG tablet, Take 40 mg by mouth once daily., Disp: , Rfl:     polyethylene glycol (GLYCOLAX) 17 gram PwPk, Take 17 g by mouth once daily., Disp: , Rfl: 0    rosuvastatin (CRESTOR) 40 MG Tab, Take 40 mg by mouth once daily., Disp: , Rfl:     senna (SENOKOT) 8.6 mg tablet, Take 1 tablet by mouth 2 (two) times daily., Disp: , Rfl:     triamcinolone acetonide 0.1% (KENALOG) 0.1 % cream, Apply topically 2 (two) times daily. Apply to  affected area twice daily as directed., Disp: 453.6 g, Rfl: 0    VITAMIN C WITH CARLOS HIPS 500 MG tablet, Take 500 mg by mouth 2 (two) times daily., Disp: , Rfl:     VITAMIN D2 1,250 mcg (50,000 unit) capsule, Take 50,000 Units by mouth every 7 days., Disp: , Rfl:     zinc sulfate (ZINCATE) 220 (50) mg capsule, Take 1 capsule (220 mg total) by mouth once daily., Disp:  , Rfl:     Review of Systems   Constitutional: Negative for activity change, appetite change, chills, fatigue and fever.   HENT: Negative for congestion, dental problem, mouth sores and sinus pressure.    Eyes: Negative for pain, redness and visual disturbance.   Respiratory: Negative for cough, shortness of breath and wheezing.    Cardiovascular: Positive for leg swelling. Negative for chest pain.   Gastrointestinal: Negative for abdominal distention, abdominal pain, diarrhea, nausea and vomiting.   Endocrine: Negative for polyuria.   Genitourinary: Negative for decreased urine volume, dysuria and frequency.   Musculoskeletal: Negative for joint swelling.   Skin: Negative for color change.   Allergic/Immunologic: Negative for food allergies.   Neurological: Negative for dizziness, weakness and headaches.   Hematological: Negative for adenopathy.   Psychiatric/Behavioral: Negative for agitation and confusion. The patient is not nervous/anxious.          Past Surgical History:   Procedure Laterality Date    ANGIOGRAPHY OF LOWER EXTREMITY N/A 2/3/2021    Procedure: Angiogram Extremity Bilateral;  Surgeon: Ernst Chacko MD;  Location: [address];  Service: Peripheral Vascular;  Laterality: N/A;  7.4 mintues fluoroscopy time  816.15 mGy  170.17 Gycm2    AORTOGRAPHY WITH EXTREMITY RUNOFF Bilateral 2/3/2021    Procedure: AORTOGRAM, WITH EXTREMITY RUNOFF;  Surgeon: Ernst Chacko MD;  Location: [address];  Service: Peripheral Vascular;  Laterality: Bilateral;    DEBRIDEMENT OF FOOT Left 2/23/2021    Procedure: DEBRIDEMENT, LEFT  HEEL;  Surgeon: Mayra Schroeder DPM;  Location: [facility] OR [address] [place] [address];  Service: Podiatry;  Laterality: Left;    FOOT AMPUTATION  October 2010    left high midfoot amputation        Reviewed records today as well as relevant labs, cultures, and imaging    Extremely medically complex  Patient is high risk for infectious complications givenwound  Objective:      There were no vitals filed for this visit.  Physical Exam  Constitutional:       Appearance: He is well-developed.   HENT:      Head: Normocephalic and atraumatic.   Eyes:      Conjunctiva/sclera: Conjunctivae normal.   Cardiovascular:      Rate and Rhythm: Normal rate and regular rhythm.      Heart sounds: Normal heart sounds. No murmur heard.  Pulmonary:      Effort: Pulmonary effort is normal. No respiratory distress.      Breath sounds: Normal breath sounds. No wheezing.   Abdominal:      General: Bowel sounds are normal. There is no distension.      Palpations: Abdomen is soft.      Tenderness: There is no abdominal tenderness.   Musculoskeletal:         General: No tenderness. Normal range of motion.      Cervical back: Neck supple.   Lymphadenopathy:      Cervical: No cervical adenopathy.   Skin:     General: Skin is warm and dry.      Findings: No rash.      Comments: B/l LE edema and chronic skin changes, L calcaneous wound with exposed bone   Neurological:      Mental Status: He is alert and oriented to person, place, and time.      Coordination: Coordination normal.   Psychiatric:         Behavior: Behavior normal.             Assessment/Plan:          73 y/o M h/o DM, HLD, CKD3, PVD s/p amputation of L foot, chronic L ankle osteo s/p 6 weeks of ciprofloxacin now presenting with new ulcer anterior R leg, L anterior calcaneus bone exposed cultured 2/9 growing proteus mirabilis and PsA, Xray 2/23 with possible osteo of calcaneus  - will give 6 week course of ciprofloxacin  - seen with podiatry today and we discussed amputation will be definitive  therapy that may help him walk with prosthetic at some point  - labs today  Discussed care with podiatry team/provider

## 2022-03-09 ENCOUNTER — OFFICE VISIT (OUTPATIENT)
Dept: PODIATRY | Facility: CLINIC | Age: 72
End: 2022-03-09
Payer: MEDICARE

## 2022-03-09 VITALS
HEART RATE: 68 BPM | RESPIRATION RATE: 18 BRPM | WEIGHT: 275 LBS | DIASTOLIC BLOOD PRESSURE: 83 MMHG | BODY MASS INDEX: 40.73 KG/M2 | SYSTOLIC BLOOD PRESSURE: 149 MMHG | HEIGHT: 69 IN

## 2022-03-09 DIAGNOSIS — L97.222 SKIN ULCER OF LEFT CALF WITH FAT LAYER EXPOSED: Primary | ICD-10-CM

## 2022-03-09 DIAGNOSIS — M79.89 LEG SWELLING: ICD-10-CM

## 2022-03-09 PROCEDURE — 3008F BODY MASS INDEX DOCD: CPT | Mod: CPTII,S$GLB,, | Performed by: PODIATRIST

## 2022-03-09 PROCEDURE — 3079F DIAST BP 80-89 MM HG: CPT | Mod: CPTII,S$GLB,, | Performed by: PODIATRIST

## 2022-03-09 PROCEDURE — 1159F PR MEDICATION LIST DOCUMENTED IN MEDICAL RECORD: ICD-10-PCS | Mod: CPTII,S$GLB,, | Performed by: PODIATRIST

## 2022-03-09 PROCEDURE — 3079F PR MOST RECENT DIASTOLIC BLOOD PRESSURE 80-89 MM HG: ICD-10-PCS | Mod: CPTII,S$GLB,, | Performed by: PODIATRIST

## 2022-03-09 PROCEDURE — 1125F PR PAIN SEVERITY QUANTIFIED, PAIN PRESENT: ICD-10-PCS | Mod: CPTII,S$GLB,, | Performed by: PODIATRIST

## 2022-03-09 PROCEDURE — 99999 PR PBB SHADOW E&M-EST. PATIENT-LVL III: CPT | Mod: PBBFAC,,, | Performed by: PODIATRIST

## 2022-03-09 PROCEDURE — 11044 PR DEBRIDEMENT, SKIN, SUB-Q TISSUE,MUSCLE,BONE,=<20 SQ CM: ICD-10-PCS | Mod: S$GLB,,, | Performed by: PODIATRIST

## 2022-03-09 PROCEDURE — 11044 DBRDMT BONE 1ST 20 SQ CM/<: CPT | Mod: S$GLB,,, | Performed by: PODIATRIST

## 2022-03-09 PROCEDURE — 3077F PR MOST RECENT SYSTOLIC BLOOD PRESSURE >= 140 MM HG: ICD-10-PCS | Mod: CPTII,S$GLB,, | Performed by: PODIATRIST

## 2022-03-09 PROCEDURE — 1125F AMNT PAIN NOTED PAIN PRSNT: CPT | Mod: CPTII,S$GLB,, | Performed by: PODIATRIST

## 2022-03-09 PROCEDURE — 1159F MED LIST DOCD IN RCRD: CPT | Mod: CPTII,S$GLB,, | Performed by: PODIATRIST

## 2022-03-09 PROCEDURE — 3077F SYST BP >= 140 MM HG: CPT | Mod: CPTII,S$GLB,, | Performed by: PODIATRIST

## 2022-03-09 PROCEDURE — 3008F PR BODY MASS INDEX (BMI) DOCUMENTED: ICD-10-PCS | Mod: CPTII,S$GLB,, | Performed by: PODIATRIST

## 2022-03-09 PROCEDURE — 99214 OFFICE O/P EST MOD 30 MIN: CPT | Mod: 25,S$GLB,, | Performed by: PODIATRIST

## 2022-03-09 PROCEDURE — 99999 PR PBB SHADOW E&M-EST. PATIENT-LVL III: ICD-10-PCS | Mod: PBBFAC,,, | Performed by: PODIATRIST

## 2022-03-09 PROCEDURE — 99214 PR OFFICE/OUTPT VISIT, EST, LEVL IV, 30-39 MIN: ICD-10-PCS | Mod: 25,S$GLB,, | Performed by: PODIATRIST

## 2022-03-09 RX ORDER — CIPROFLOXACIN 500 MG/1
500 TABLET ORAL 2 TIMES DAILY
Qty: 84 TABLET | Refills: 0 | Status: SHIPPED | OUTPATIENT
Start: 2022-03-09 | End: 2022-04-20

## 2022-03-11 ENCOUNTER — HOSPITAL ENCOUNTER (OUTPATIENT)
Dept: VASCULAR SURGERY | Facility: CLINIC | Age: 72
Discharge: HOME | End: 2022-03-11
Attending: PODIATRIST
Payer: MEDICARE

## 2022-03-11 DIAGNOSIS — M79.89 LEG SWELLING: ICD-10-CM

## 2022-03-11 DIAGNOSIS — L97.222 SKIN ULCER OF LEFT CALF WITH FAT LAYER EXPOSED: ICD-10-CM

## 2022-03-11 PROCEDURE — 93971 PR US DUPLEX, UPPER OR LOWER EXT VENOUS,UNILAT OR LTD: ICD-10-PCS | Mod: S$GLB,,, | Performed by: SURGERY

## 2022-03-11 PROCEDURE — 93971 EXTREMITY STUDY: CPT | Mod: S$GLB,,, | Performed by: SURGERY

## 2022-03-21 NOTE — PROGRESS NOTES
Subjective:      Patient ID: Saji Castañeda is a 72 y.o. male.    Chief Complaint: Wound Care (Bilateral leg ), Foot Ulcer, and Dressing Change (Unna Boots )    Saji is a 72 y.o. male who presents to the clinic for evaluation and treatment of high risk feet. Saji has a past medical history of Arthritis, Diabetes mellitus, Diabetes mellitus, type 2, Hyperlipidemia, and Osteomyelitis. The patient's chief complaint is diabetic foot ulcer, L heel and leg . This patient has documented high risk feet requiring routine maintenance secondary to peripheral neuropathy.    PCP: Serge Holland MD    Date Last Seen by PCP: Patient does not have a PCP or has not yet seen their PCP     Chief Complaint   Patient presents with    Wound Care     Bilateral leg     Foot Ulcer    Dressing Change     Unna Boots      History of Trauma: negative  Sign of Infection: none    Hemoglobin A1C   Date Value Ref Range Status   12/08/2020 7.9 (H) 4.0 - 5.6 % Final     Comment:     ADA Screening Guidelines:  5.7-6.4%  Consistent with prediabetes  >or=6.5%  Consistent with diabetes  High levels of fetal hemoglobin interfere with the HbA1C  assay. Heterozygous hemoglobin variants (HbS, HgC, etc)do  not significantly interfere with this assay.   However, presence of multiple variants may affect accuracy.     12/05/2019 9.8 (H) 4.0 - 5.6 % Final     Comment:     ADA Screening Guidelines:  5.7-6.4%  Consistent with prediabetes  >or=6.5%  Consistent with diabetes  High levels of fetal hemoglobin interfere with the HbA1C  assay. Heterozygous hemoglobin variants (HbS, HgC, etc)do  not significantly interfere with this assay.   However, presence of multiple variants may affect accuracy.     10/15/2018 7.7 (H) 4.0 - 5.6 % Final     Comment:     ADA Screening Guidelines:  5.7-6.4%  Consistent with prediabetes  >or=6.5%  Consistent with diabetes  High levels of fetal hemoglobin interfere with the HbA1C  assay. Heterozygous hemoglobin variants  "(HbS, HgC, etc)do  not significantly interfere with this assay.   However, presence of multiple variants may affect accuracy.         Review of Systems   Constitutional: Negative for chills and fever.   Cardiovascular: Positive for leg swelling. Negative for chest pain and claudication.   Respiratory: Negative for cough and shortness of breath.    Skin: Positive for dry skin, nail changes (R foot only. L foot amputation) and poor wound healing (L leg and heel ulcers). Negative for flushing, itching and rash.   Musculoskeletal: Negative for back pain.        L foot amputation (Choparts)    Gastrointestinal: Negative for nausea and vomiting.   Neurological: Positive for numbness and sensory change. Negative for paresthesias.   Psychiatric/Behavioral: Negative for altered mental status.           Objective:       Vitals:    22 1307   BP: (!) 178/63   Pulse: (!) 42   Resp: 18   Weight: 124.7 kg (275 lb)   Height: 5' 9" (1.753 m)   PainSc: 0-No pain        Physical Exam  Vitals reviewed.   Constitutional:       Appearance: He is well-developed.   HENT:      Head: Normocephalic.   Cardiovascular:      Comments: DP/PT pulses diminished b/l CRT < 3 sec to tips of toes.    Pulmonary:      Effort: No respiratory distress.   Musculoskeletal:      Right lower le+ Edema present.      Left lower le+ Edema present.      Comments: L foot chopart's amputation.      Skin:     General: Skin is warm and dry.      Findings: No erythema.      Comments: Overall thickening of skin of b/l legs and ankles  Mild hyperpigmentation to skin of both ankles and/or feet, consistent with hemosiderin deposits.     Diminished pedal hair b/l.     Ulcer L heel: 3.0x2.8x0.6  Depth: bone  Periphery: hyperkeratotic rim + maceration- improved  Base: granular with exposed bone  Drainage: Serosanguinous  SOI:  Exposed bone , improved  Drainage and leg swelling    Ulcer anterior L mid leg: 0.3x0.4x0.1.cm   Depth: subcutaneous tissue " layer  Periphery:intact   Base: granular with biofilm  Drainage: Serosanguinous  SOI: none    Ulcer Posterior L mid leg: 0.5 x 0.3x 0.1cm  Depth: subcutaneous tissue layer  Periphery: hyperkeratotic rim  Base: granular with biofilm  Drainage: Serosanguinous  SOI: none   Neurological:      Mental Status: He is alert and oriented to person, place, and time.      Sensory: Sensory deficit present.      Comments: Light touch, proprioception, and sharp/dull sensation are all intact bilaterally. Protective threshold with the [place]-Wienstein monofilament is diminished bilaterally.    Psychiatric:         Behavior: Behavior normal.         Thought Content: Thought content normal.         Judgment: Judgment normal.               Assessment:       Encounter Diagnoses   Name Primary?    Skin ulcer of left calf with fat layer exposed Yes    Type II diabetes mellitus with neurological manifestations     Heel ulcer, left, with necrosis of bone     PVD (peripheral vascular disease)              Plan:       Saji was seen today for wound care, foot ulcer and dressing change.    Diagnoses and all orders for this visit:    Skin ulcer of left calf with fat layer exposed  -     X-Ray Tibia Fibula 2 View Left; Future    Type II diabetes mellitus with neurological manifestations  -     X-Ray Foot Complete Right; Future    Heel ulcer, left, with necrosis of bone    PVD (peripheral vascular disease)      I counseled the patient on his conditions, their implications and medical management.     heel and leg ulcers assessed and examined.     Radiographs: soft tissue swelling, shows DJD changes, likely chronic ? OM.    L heel ulcer slightly improved in appearance, pt feeling better. Did not get blood work after last visit     Nonviable tissues were debrided beyond the level of  bone utilizing a  sterile No. 15 scalpel and forceps. Minimal bleeding controlled with direct pressure  The patient tolerated this well.     Applied HB to all  wounds,     Ashanti Nolen MA assisted w/ application of football dressing under my direct supervision. Darco shoe applied to offload the area. Advised patient that this should be worn on the affected foot at all times when ambulating     I discussed with the  patient  signs and symptoms of infection including redness, drainage, purulence, odor, pain, elevated BS, streaking, fever, chills, etc . Patient is   to seek medical attention (ER or urgent care) if these symptoms occur      RTC 2 weeks, sooner PRN

## 2022-03-21 NOTE — PROGRESS NOTES
Subjective:      Patient ID: Saji Castañeda is a 72 y.o. male.    Chief Complaint: Wound Care (Bilateral foot and leg wounds ), Foot Ulcer, and Foot Swelling    Saji is a 72 y.o. male who presents to the clinic for evaluation and treatment of high risk feet. Saji has a past medical history of Arthritis, Diabetes mellitus, Diabetes mellitus, type 2, Hyperlipidemia, and Osteomyelitis. The patient's chief complaint is diabetic foot ulcer, L heel and leg . This patient has documented high risk feet requiring routine maintenance secondary to peripheral neuropathy.    PCP: Serge Holland MD    Date Last Seen by PCP: Patient does not have a PCP or has not yet seen their PCP     Chief Complaint   Patient presents with    Wound Care     Bilateral foot and leg wounds     Foot Ulcer    Foot Swelling     History of Trauma: negative  Sign of Infection: none    Hemoglobin A1C   Date Value Ref Range Status   12/08/2020 7.9 (H) 4.0 - 5.6 % Final     Comment:     ADA Screening Guidelines:  5.7-6.4%  Consistent with prediabetes  >or=6.5%  Consistent with diabetes  High levels of fetal hemoglobin interfere with the HbA1C  assay. Heterozygous hemoglobin variants (HbS, HgC, etc)do  not significantly interfere with this assay.   However, presence of multiple variants may affect accuracy.     12/05/2019 9.8 (H) 4.0 - 5.6 % Final     Comment:     ADA Screening Guidelines:  5.7-6.4%  Consistent with prediabetes  >or=6.5%  Consistent with diabetes  High levels of fetal hemoglobin interfere with the HbA1C  assay. Heterozygous hemoglobin variants (HbS, HgC, etc)do  not significantly interfere with this assay.   However, presence of multiple variants may affect accuracy.     10/15/2018 7.7 (H) 4.0 - 5.6 % Final     Comment:     ADA Screening Guidelines:  5.7-6.4%  Consistent with prediabetes  >or=6.5%  Consistent with diabetes  High levels of fetal hemoglobin interfere with the HbA1C  assay. Heterozygous hemoglobin  "variants (HbS, HgC, etc)do  not significantly interfere with this assay.   However, presence of multiple variants may affect accuracy.         Review of Systems   Constitutional: Negative for chills and fever.   Cardiovascular: Positive for leg swelling. Negative for chest pain and claudication.   Respiratory: Negative for cough and shortness of breath.    Skin: Positive for dry skin, nail changes (R foot only. L foot amputation) and poor wound healing (L leg and heel ulcers). Negative for flushing, itching and rash.   Musculoskeletal: Negative for back pain.        L foot amputation (Choparts)    Gastrointestinal: Negative for nausea and vomiting.   Neurological: Positive for numbness and sensory change. Negative for paresthesias.   Psychiatric/Behavioral: Negative for altered mental status.           Objective:       Vitals:    22 1412   BP: (!) 176/60   Pulse: 67   Resp: 18   Weight: 124.7 kg (275 lb)   Height: 5' 9" (1.753 m)   PainSc: 0-No pain        Physical Exam  Vitals reviewed.   Constitutional:       Appearance: He is well-developed.   HENT:      Head: Normocephalic.   Cardiovascular:      Comments: DP/PT pulses diminished b/l CRT < 3 sec to tips of toes.    Pulmonary:      Effort: No respiratory distress.   Musculoskeletal:      Right lower le+ Edema present.      Left lower le+ Edema present.      Comments: L foot chopart's amputation.      Skin:     General: Skin is warm and dry.      Findings: No erythema.      Comments: Overall thickening of skin of b/l legs and ankles  Mild hyperpigmentation to skin of both ankles and/or feet, consistent with hemosiderin deposits.     Diminished pedal hair b/l.     Ulcer L heel: 3.0x3.0x0.6  Depth: bone  Periphery: hyperkeratotic rim + maceration  Base: granular with exposed bone and debris  Drainage: Serosanguinous  SOI: yes, increased depth, exposed bone, increased drainage, leg swelling    Ulcer anterior L mid leg: 0.3x0.4x0.1.cm   Depth: " subcutaneous tissue layer  Periphery:intact   Base: granular with biofilm  Drainage: Serosanguinous  SOI: none    Ulcer Posterior L mid leg: 0.3 x 0.3x 0.1cm  Depth: subcutaneous tissue layer  Periphery: hyperkeratotic rim  Base: granular with biofilm  Drainage: Serosanguinous  SOI: none   Neurological:      Mental Status: He is alert and oriented to person, place, and time.      Sensory: Sensory deficit present.      Comments: Light touch, proprioception, and sharp/dull sensation are all intact bilaterally. Protective threshold with the [place]-Wienstein monofilament is diminished bilaterally.    Psychiatric:         Behavior: Behavior normal.         Thought Content: Thought content normal.         Judgment: Judgment normal.               Assessment:       Encounter Diagnoses   Name Primary?    Type II diabetes mellitus with neurological manifestations Yes    Heel ulcer, left, with necrosis of bone              Plan:       Saji was seen today for wound care, foot ulcer and foot swelling.    Diagnoses and all orders for this visit:    Type II diabetes mellitus with neurological manifestations  -     C-reactive protein; Future  -     Sedimentation rate; Future  -     Comprehensive Metabolic Panel; Future  -     CBC Auto Differential; Future  -     X-Ray Foot Complete Left; Future  -     Cancel: X-Ray Tibia Fibula 2 View Right; Future  -     Aerobic culture  -     Culture, Anaerobic  -     Specimen to Pathology Other    Heel ulcer, left, with necrosis of bone  -     C-reactive protein; Future  -     Sedimentation rate; Future  -     Comprehensive Metabolic Panel; Future  -     CBC Auto Differential; Future  -     X-Ray Foot Complete Left; Future  -     Cancel: X-Ray Tibia Fibula 2 View Right; Future  -     Aerobic culture  -     Culture, Anaerobic  -     Specimen to Pathology Other    Other orders  -     doxycycline (VIBRA-TABS) 100 MG tablet; Take 1 tablet (100 mg total) by mouth every 12 (twelve) hours. for 10  days      I counseled the patient on his conditions, their implications and medical management.     heel and leg ulcers assessed and examined.     L heel ulcer now w/ increased drainage and exposed bone    Nonviable tissues were debrided beyond the level of  bone utilizing a  sterile No. 15 scalpel and forceps. Minimal bleeding controlled with direct pressure  The patient tolerated this well.     R leg blister healed     Increased leg swelling L w/ pt complaining of chills + acute wound worsening     Aerobic/anaerobic cultures obtained from wound. Prescription written for broad spectrum abx, will monitor results and tailor abx as needed.     Rx Doxy     Infection labs  Ordered, ID consulted  Applied HB to all wounds,     I discussed with the  patient  signs and symptoms of infection including redness, drainage, purulence, odor, pain, elevated BS, streaking, fever, chills, etc . Patient is   to seek medical attention (ER or urgent care) if these symptoms occur      RTC 2 weeks, sooner PRN

## 2022-03-21 NOTE — PROGRESS NOTES
Subjective:      Patient ID: Saji Castañeda is a 72 y.o. male.    Chief Complaint: Follow-up (Wound care ), Dressing Change (Unna boots ), and Foot Ulcer (Left leg )    Saji is a 72 y.o. male who presents to the clinic for evaluation and treatment of high risk feet. Saji has a past medical history of Arthritis, Diabetes mellitus, Diabetes mellitus, type 2, Hyperlipidemia, and Osteomyelitis. The patient's chief complaint is diabetic foot ulcer, L heel and leg . This patient has documented high risk feet requiring routine maintenance secondary to peripheral neuropathy.    PCP: Serge Holland MD    Date Last Seen by PCP: Patient does not have a PCP or has not yet seen their PCP     Chief Complaint   Patient presents with    Follow-up     Wound care     Dressing Change     Unna boots     Foot Ulcer     Left leg      History of Trauma: negative  Sign of Infection: none    Hemoglobin A1C   Date Value Ref Range Status   12/08/2020 7.9 (H) 4.0 - 5.6 % Final     Comment:     ADA Screening Guidelines:  5.7-6.4%  Consistent with prediabetes  >or=6.5%  Consistent with diabetes  High levels of fetal hemoglobin interfere with the HbA1C  assay. Heterozygous hemoglobin variants (HbS, HgC, etc)do  not significantly interfere with this assay.   However, presence of multiple variants may affect accuracy.     12/05/2019 9.8 (H) 4.0 - 5.6 % Final     Comment:     ADA Screening Guidelines:  5.7-6.4%  Consistent with prediabetes  >or=6.5%  Consistent with diabetes  High levels of fetal hemoglobin interfere with the HbA1C  assay. Heterozygous hemoglobin variants (HbS, HgC, etc)do  not significantly interfere with this assay.   However, presence of multiple variants may affect accuracy.     10/15/2018 7.7 (H) 4.0 - 5.6 % Final     Comment:     ADA Screening Guidelines:  5.7-6.4%  Consistent with prediabetes  >or=6.5%  Consistent with diabetes  High levels of fetal hemoglobin interfere with the HbA1C  assay. Heterozygous  "hemoglobin variants (HbS, HgC, etc)do  not significantly interfere with this assay.   However, presence of multiple variants may affect accuracy.         Review of Systems   Constitutional: Negative for chills and fever.   Cardiovascular: Positive for leg swelling. Negative for chest pain and claudication.   Respiratory: Negative for cough and shortness of breath.    Skin: Positive for color change, dry skin, nail changes (R foot only. L foot amputation) and poor wound healing (L leg and heel ulcers). Negative for flushing, itching and rash.   Musculoskeletal: Negative for back pain.        L foot amputation (Choparts)    Gastrointestinal: Negative for nausea and vomiting.   Neurological: Positive for numbness and sensory change. Negative for paresthesias.   Psychiatric/Behavioral: Negative for altered mental status.           Objective:       Vitals:    22 1329   BP: (!) 149/83   Pulse: 68   Resp: 18   Weight: 124.7 kg (275 lb)   Height: 5' 9" (1.753 m)   PainSc: 10-Worst pain ever   PainLoc: Leg        Physical Exam  Vitals reviewed.   Constitutional:       Appearance: He is well-developed.   HENT:      Head: Normocephalic.   Cardiovascular:      Comments: DP/PT pulses diminished b/l CRT < 3 sec to tips of toes.    Pulmonary:      Effort: No respiratory distress.   Musculoskeletal:      Right lower le+ Edema present.      Left lower le+ Edema present.      Comments: L foot chopart's amputation.      Skin:     General: Skin is warm and dry.      Findings: No erythema.      Comments: Overall thickening of skin of b/l legs and ankles  Mild hyperpigmentation to skin of both ankles and/or feet, consistent with hemosiderin deposits.     Diminished pedal hair b/l.     Ulcer L heel: 3.0x2.8x0.5  Depth: bone  Periphery: hyperkeratotic rim + maceration- improved  Base: granular with exposed bone  Drainage: Serosanguinous  SOI:  Exposed bone , - stable, leg swelling    Ulcer anterior L mid leg: healed     Ulcer " Posterior L mid leg: 0.5 x 0.3x 0.1cm  Depth: subcutaneous tissue layer  Periphery: hyperkeratotic rim  Base: granular with biofilm  Drainage: Serosanguinous  SOI: none   Neurological:      Mental Status: He is alert and oriented to person, place, and time.      Sensory: Sensory deficit present.      Comments: Light touch, proprioception, and sharp/dull sensation are all intact bilaterally. Protective threshold with the [place]-Wienstein monofilament is diminished bilaterally.    Psychiatric:         Behavior: Behavior normal.         Thought Content: Thought content normal.         Judgment: Judgment normal.               Assessment:       Encounter Diagnoses   Name Primary?    Skin ulcer of left calf with fat layer exposed Yes    Leg swelling              Plan:       Saji was seen today for follow-up, dressing change and foot ulcer.    Diagnoses and all orders for this visit:    Skin ulcer of left calf with fat layer exposed  -     VAS US Venous Leg Left; Future    Leg swelling  -     VAS US Venous Leg Left; Future    Other orders  -     ciprofloxacin HCl (CIPRO) 500 MG tablet; Take 1 tablet (500 mg total) by mouth 2 (two) times daily. (Patient not taking: Reported on 3/9/2022)      I counseled the patient on his conditions, their implications and medical management.     heel and leg ulcers assessed and examined.     Cipro as per Dr Amin-- appreciate his input     Long conversation with patient regarding wound progression. He has an extremely proximal foot amputation which makes fitting of shoe gear unlikely. Currently due to foot pt is wheel chair bound. Current wheel chair is broken. New wheelchair rx dispensed 2 visits ago. Pt has not contacted insurance company regarding fabrication of new wheel chair. He also uses the L foot stump for propulsion. Coupled w/ hx of PVD, chronic pressures, wheelchair bound and utilization of limb for propulsion we discussed BKA. pt currently declines. I do believer her would  benefit from a BKA which should allow weight bearing for proper ambulation.     Nonviable tissues were debrided beyond the level of  bone utilizing a  sterile No. 15 scalpel and forceps. Minimal bleeding controlled with direct pressure  The patient tolerated this well.     Applied HB to all wounds,     Ashanti oNlen MA assisted w/ application of football dressing under my direct supervision. Darco shoe applied to offload the area. Advised patient that this should be worn on the affected foot at all times when ambulating     I discussed with the  patient  signs and symptoms of infection including redness, drainage, purulence, odor, pain, elevated BS, streaking, fever, chills, etc . Patient is   to seek medical attention (ER or urgent care) if these symptoms occur      RTC 2 weeks, sooner PRN

## 2022-03-23 ENCOUNTER — OFFICE VISIT (OUTPATIENT)
Dept: PODIATRY | Facility: CLINIC | Age: 72
End: 2022-03-23
Payer: MEDICARE

## 2022-03-23 VITALS
BODY MASS INDEX: 40.73 KG/M2 | DIASTOLIC BLOOD PRESSURE: 66 MMHG | WEIGHT: 275 LBS | HEIGHT: 69 IN | SYSTOLIC BLOOD PRESSURE: 179 MMHG | HEART RATE: 49 BPM

## 2022-03-23 DIAGNOSIS — L97.222 SKIN ULCER OF LEFT CALF WITH FAT LAYER EXPOSED: Primary | ICD-10-CM

## 2022-03-23 DIAGNOSIS — L97.424 HEEL ULCER, LEFT, WITH NECROSIS OF BONE: ICD-10-CM

## 2022-03-23 DIAGNOSIS — E11.49 TYPE II DIABETES MELLITUS WITH NEUROLOGICAL MANIFESTATIONS: ICD-10-CM

## 2022-03-23 DIAGNOSIS — M79.89 LEG SWELLING: ICD-10-CM

## 2022-03-23 PROCEDURE — 99999 PR PBB SHADOW E&M-EST. PATIENT-LVL II: CPT | Mod: PBBFAC,,, | Performed by: PODIATRIST

## 2022-03-23 PROCEDURE — 3078F PR MOST RECENT DIASTOLIC BLOOD PRESSURE < 80 MM HG: ICD-10-PCS | Mod: CPTII,S$GLB,, | Performed by: PODIATRIST

## 2022-03-23 PROCEDURE — 1125F AMNT PAIN NOTED PAIN PRSNT: CPT | Mod: CPTII,S$GLB,, | Performed by: PODIATRIST

## 2022-03-23 PROCEDURE — 1125F PR PAIN SEVERITY QUANTIFIED, PAIN PRESENT: ICD-10-PCS | Mod: CPTII,S$GLB,, | Performed by: PODIATRIST

## 2022-03-23 PROCEDURE — 99499 NO LOS: ICD-10-PCS | Mod: S$GLB,,, | Performed by: PODIATRIST

## 2022-03-23 PROCEDURE — 3077F PR MOST RECENT SYSTOLIC BLOOD PRESSURE >= 140 MM HG: ICD-10-PCS | Mod: CPTII,S$GLB,, | Performed by: PODIATRIST

## 2022-03-23 PROCEDURE — 99999 PR PBB SHADOW E&M-EST. PATIENT-LVL II: ICD-10-PCS | Mod: PBBFAC,,, | Performed by: PODIATRIST

## 2022-03-23 PROCEDURE — 1159F MED LIST DOCD IN RCRD: CPT | Mod: CPTII,S$GLB,, | Performed by: PODIATRIST

## 2022-03-23 PROCEDURE — 3008F BODY MASS INDEX DOCD: CPT | Mod: CPTII,S$GLB,, | Performed by: PODIATRIST

## 2022-03-23 PROCEDURE — 3008F PR BODY MASS INDEX (BMI) DOCUMENTED: ICD-10-PCS | Mod: CPTII,S$GLB,, | Performed by: PODIATRIST

## 2022-03-23 PROCEDURE — 3077F SYST BP >= 140 MM HG: CPT | Mod: CPTII,S$GLB,, | Performed by: PODIATRIST

## 2022-03-23 PROCEDURE — 11044 DBRDMT BONE 1ST 20 SQ CM/<: CPT | Mod: S$GLB,,, | Performed by: PODIATRIST

## 2022-03-23 PROCEDURE — 99499 UNLISTED E&M SERVICE: CPT | Mod: S$GLB,,, | Performed by: PODIATRIST

## 2022-03-23 PROCEDURE — 11044 PR DEBRIDEMENT, SKIN, SUB-Q TISSUE,MUSCLE,BONE,=<20 SQ CM: ICD-10-PCS | Mod: S$GLB,,, | Performed by: PODIATRIST

## 2022-03-23 PROCEDURE — 1159F PR MEDICATION LIST DOCUMENTED IN MEDICAL RECORD: ICD-10-PCS | Mod: CPTII,S$GLB,, | Performed by: PODIATRIST

## 2022-03-23 PROCEDURE — 3078F DIAST BP <80 MM HG: CPT | Mod: CPTII,S$GLB,, | Performed by: PODIATRIST

## 2022-03-23 NOTE — PROGRESS NOTES
Subjective:      Patient ID: Saji Castañeda is a 72 y.o. male.    Chief Complaint: Wound Care (Left leg large blister and wound ), Foot Ulcer, Dressing Change, Drainage (Left leg ), and Foot Pain    Saji is a 72 y.o. male who presents to the clinic for evaluation and treatment of high risk feet. Saji has a past medical history of Arthritis, Diabetes mellitus, Diabetes mellitus, type 2, Hyperlipidemia, and Osteomyelitis. The patient's chief complaint is diabetic foot ulcer, L heel and leg . This patient has documented high risk feet requiring routine maintenance secondary to peripheral neuropathy.  No issues w/ abx       PCP: Serge Holland MD    Date Last Seen by PCP: Patient does not have a PCP or has not yet seen their PCP     Chief Complaint   Patient presents with    Wound Care     Left leg large blister and wound     Foot Ulcer    Dressing Change    Drainage     Left leg     Foot Pain     History of Trauma: negative  Sign of Infection: none    Hemoglobin A1C   Date Value Ref Range Status   12/08/2020 7.9 (H) 4.0 - 5.6 % Final     Comment:     ADA Screening Guidelines:  5.7-6.4%  Consistent with prediabetes  >or=6.5%  Consistent with diabetes  High levels of fetal hemoglobin interfere with the HbA1C  assay. Heterozygous hemoglobin variants (HbS, HgC, etc)do  not significantly interfere with this assay.   However, presence of multiple variants may affect accuracy.     12/05/2019 9.8 (H) 4.0 - 5.6 % Final     Comment:     ADA Screening Guidelines:  5.7-6.4%  Consistent with prediabetes  >or=6.5%  Consistent with diabetes  High levels of fetal hemoglobin interfere with the HbA1C  assay. Heterozygous hemoglobin variants (HbS, HgC, etc)do  not significantly interfere with this assay.   However, presence of multiple variants may affect accuracy.     10/15/2018 7.7 (H) 4.0 - 5.6 % Final     Comment:     ADA Screening Guidelines:  5.7-6.4%  Consistent with prediabetes  >or=6.5%  Consistent with  "diabetes  High levels of fetal hemoglobin interfere with the HbA1C  assay. Heterozygous hemoglobin variants (HbS, HgC, etc)do  not significantly interfere with this assay.   However, presence of multiple variants may affect accuracy.         Review of Systems   Constitutional: Negative for chills and fever.   Cardiovascular: Positive for leg swelling. Negative for chest pain and claudication.   Respiratory: Negative for cough and shortness of breath.    Skin: Positive for color change, dry skin, nail changes (R foot only. L foot amputation) and poor wound healing (L leg and heel ulcers). Negative for flushing, itching and rash.   Musculoskeletal: Negative for back pain.        L foot amputation (Choparts)    Gastrointestinal: Negative for nausea and vomiting.   Neurological: Positive for numbness and sensory change. Negative for paresthesias.   Psychiatric/Behavioral: Negative for altered mental status.           Objective:       Vitals:    22 1317   BP: (!) 179/66   Pulse: (!) 49   Weight: 124.7 kg (275 lb)   Height: 5' 9" (1.753 m)   PainSc:   9   PainLoc: Leg        Physical Exam  Vitals reviewed.   Constitutional:       Appearance: He is well-developed.   HENT:      Head: Normocephalic.   Cardiovascular:      Comments: DP/PT pulses diminished b/l CRT < 3 sec to tips of toes.    Pulmonary:      Effort: No respiratory distress.   Musculoskeletal:      Right lower le+ Edema present.      Left lower le+ Edema present.      Comments: L foot chopart's amputation.      Skin:     General: Skin is warm and dry.      Coloration: Skin is not jaundiced or pale.      Findings: No bruising or erythema.      Comments: Overall thickening of skin of b/l legs and ankles    Mild hyperpigmentation to skin of both ankles and/or feet, consistent with hemosiderin deposits.     Diminished pedal hair b/l.     Ulcer L heel: 2.9x2.8x0.5  Depth: bone  Periphery: hyperkeratotic rim - improved  Base: granular with exposed " bone  Drainage: Serosanguinous  SOI:  Exposed bone , - stable, leg swelling    Ulcer anterior L mid leg: healed     Ulcer Posterior L mid leg: 0.4 x 0.3x 0.1cm  Depth: subcutaneous tissue layer  Periphery: hyperkeratotic rim  Base: granular with biofilm  Drainage: Serosanguinous  SOI: none   Neurological:      Mental Status: He is alert and oriented to person, place, and time.      Sensory: Sensory deficit present.      Comments: Light touch, proprioception, and sharp/dull sensation are all intact bilaterally. Protective threshold with the [place]-Wienstein monofilament is diminished bilaterally.    Psychiatric:         Behavior: Behavior normal.         Thought Content: Thought content normal.         Judgment: Judgment normal.               Assessment:       Encounter Diagnoses   Name Primary?    Skin ulcer of left calf with fat layer exposed Yes    Leg swelling     Type II diabetes mellitus with neurological manifestations     Heel ulcer, left, with necrosis of bone              Plan:       Saji was seen today for wound care, foot ulcer, dressing change, drainage and foot pain.    Diagnoses and all orders for this visit:    Skin ulcer of left calf with fat layer exposed    Leg swelling    Type II diabetes mellitus with neurological manifestations    Heel ulcer, left, with necrosis of bone      I counseled the patient on his conditions, their implications and medical management.     heel and leg ulcers assessed and examined.     Cipro as per Dr Amin-- appreciate his input     Long conversation with patient regarding wound progression. He has an extremely proximal foot amputation which makes fitting of shoe gear unlikely. Currently due to foot pt is wheel chair bound. Current wheel chair is broken. New wheelchair rx dispensed 2 visits ago. Pt has not contacted insurance company regarding fabrication of new wheel chair. He also uses the L foot stump for propulsion. Coupled w/ hx of PVD, chronic pressures,  wheelchair bound and utilization of limb for propulsion we discussed BKA. pt currently declines. I do believer her would benefit from a BKA which should allow weight bearing for proper ambulation.     Nonviable tissues were debrided beyond the level of  bone utilizing a  sterile No. 15 scalpel and forceps. Minimal bleeding controlled with direct pressure  The patient tolerated this well.     Applied HB to all wounds,     Hailey BRANNON RN  assisted w/ application of football dressing under my direct supervision. Darco shoe applied to offload the area. Advised patient that this should be worn on the affected foot at all times when ambulating     I discussed with the  patient  signs and symptoms of infection including redness, drainage, purulence, odor, pain, elevated BS, streaking, fever, chills, etc . Patient is   to seek medical attention (ER or urgent care) if these symptoms occur      RTC 2 weeks, sooner PRN

## 2022-03-31 PROCEDURE — G0180 MD CERTIFICATION HHA PATIENT: HCPCS | Mod: ,,, | Performed by: PODIATRIST

## 2022-03-31 PROCEDURE — G0180 PR HOME HEALTH MD CERTIFICATION: ICD-10-PCS | Mod: ,,, | Performed by: PODIATRIST

## 2022-04-01 ENCOUNTER — TELEPHONE (OUTPATIENT)
Dept: PODIATRY | Facility: CLINIC | Age: 72
End: 2022-04-01
Payer: MEDICARE

## 2022-04-01 NOTE — TELEPHONE ENCOUNTER
----- Message from Марина Ríos sent at 4/1/2022 11:33 AM CDT -----  Contact: Sandy ([facility])  Sandy with [facility] requesting call back re: updated wound care orders for pt. Caller states she can take a verbal order if that is easier than faxing.    Confirmed contact below:  Contact Name: Sandy  Phone Number: [phone]

## 2022-04-05 ENCOUNTER — EXTERNAL HOME HEALTH (OUTPATIENT)
Dept: HOME HEALTH SERVICES | Facility: HOSPITAL | Age: 72
End: 2022-04-05
Payer: MEDICARE

## 2022-04-06 ENCOUNTER — OFFICE VISIT (OUTPATIENT)
Dept: PODIATRY | Facility: CLINIC | Age: 72
End: 2022-04-06
Payer: MEDICARE

## 2022-04-06 VITALS — BODY MASS INDEX: 40.88 KG/M2 | HEIGHT: 69 IN | WEIGHT: 276 LBS

## 2022-04-06 DIAGNOSIS — E11.49 TYPE II DIABETES MELLITUS WITH NEUROLOGICAL MANIFESTATIONS: Primary | ICD-10-CM

## 2022-04-06 DIAGNOSIS — M79.89 LEG SWELLING: ICD-10-CM

## 2022-04-06 DIAGNOSIS — I73.9 PVD (PERIPHERAL VASCULAR DISEASE): ICD-10-CM

## 2022-04-06 DIAGNOSIS — L97.222 SKIN ULCER OF LEFT CALF WITH FAT LAYER EXPOSED: ICD-10-CM

## 2022-04-06 DIAGNOSIS — L97.424 HEEL ULCER, LEFT, WITH NECROSIS OF BONE: ICD-10-CM

## 2022-04-06 PROCEDURE — 99499 UNLISTED E&M SERVICE: CPT | Mod: S$GLB,,, | Performed by: PODIATRIST

## 2022-04-06 PROCEDURE — 4010F ACE/ARB THERAPY RXD/TAKEN: CPT | Mod: CPTII,S$GLB,, | Performed by: PODIATRIST

## 2022-04-06 PROCEDURE — 11044 PR DEBRIDEMENT, SKIN, SUB-Q TISSUE,MUSCLE,BONE,=<20 SQ CM: ICD-10-PCS | Mod: S$GLB,,, | Performed by: PODIATRIST

## 2022-04-06 PROCEDURE — 99999 PR PBB SHADOW E&M-EST. PATIENT-LVL V: CPT | Mod: PBBFAC,,, | Performed by: PODIATRIST

## 2022-04-06 PROCEDURE — 3008F BODY MASS INDEX DOCD: CPT | Mod: CPTII,S$GLB,, | Performed by: PODIATRIST

## 2022-04-06 PROCEDURE — 99999 PR PBB SHADOW E&M-EST. PATIENT-LVL V: ICD-10-PCS | Mod: PBBFAC,,, | Performed by: PODIATRIST

## 2022-04-06 PROCEDURE — 11044 DBRDMT BONE 1ST 20 SQ CM/<: CPT | Mod: S$GLB,,, | Performed by: PODIATRIST

## 2022-04-06 PROCEDURE — 4010F PR ACE/ARB THEARPY RXD/TAKEN: ICD-10-PCS | Mod: CPTII,S$GLB,, | Performed by: PODIATRIST

## 2022-04-06 PROCEDURE — 3008F PR BODY MASS INDEX (BMI) DOCUMENTED: ICD-10-PCS | Mod: CPTII,S$GLB,, | Performed by: PODIATRIST

## 2022-04-06 PROCEDURE — 1126F PR PAIN SEVERITY QUANTIFIED, NO PAIN PRESENT: ICD-10-PCS | Mod: CPTII,S$GLB,, | Performed by: PODIATRIST

## 2022-04-06 PROCEDURE — 1126F AMNT PAIN NOTED NONE PRSNT: CPT | Mod: CPTII,S$GLB,, | Performed by: PODIATRIST

## 2022-04-06 PROCEDURE — 99499 NO LOS: ICD-10-PCS | Mod: S$GLB,,, | Performed by: PODIATRIST

## 2022-04-06 RX ORDER — CIPROFLOXACIN 500 MG/1
1 TABLET ORAL 2 TIMES DAILY
COMMUNITY
Start: 2022-03-11 | End: 2022-06-15 | Stop reason: CLARIF

## 2022-04-13 ENCOUNTER — TELEPHONE (OUTPATIENT)
Dept: PODIATRY | Facility: CLINIC | Age: 72
End: 2022-04-13
Payer: MEDICARE

## 2022-04-13 NOTE — TELEPHONE ENCOUNTER
----- Message from Chrissy Trujillo sent at 4/13/2022 10:42 AM CDT -----  Lindsay with [facility]  Calling to  Speak with staff regarding pts care     Ph

## 2022-04-20 ENCOUNTER — OFFICE VISIT (OUTPATIENT)
Dept: PODIATRY | Facility: CLINIC | Age: 72
End: 2022-04-20
Payer: MEDICARE

## 2022-04-20 VITALS — WEIGHT: 274 LBS | RESPIRATION RATE: 18 BRPM | BODY MASS INDEX: 40.58 KG/M2 | HEIGHT: 69 IN

## 2022-04-20 DIAGNOSIS — I73.9 PVD (PERIPHERAL VASCULAR DISEASE): ICD-10-CM

## 2022-04-20 DIAGNOSIS — E11.49 TYPE II DIABETES MELLITUS WITH NEUROLOGICAL MANIFESTATIONS: Primary | ICD-10-CM

## 2022-04-20 DIAGNOSIS — L97.222 SKIN ULCER OF LEFT CALF WITH FAT LAYER EXPOSED: ICD-10-CM

## 2022-04-20 DIAGNOSIS — L97.424 HEEL ULCER, LEFT, WITH NECROSIS OF BONE: ICD-10-CM

## 2022-04-20 PROCEDURE — 99999 PR PBB SHADOW E&M-EST. PATIENT-LVL V: ICD-10-PCS | Mod: PBBFAC,,, | Performed by: PODIATRIST

## 2022-04-20 PROCEDURE — 4010F PR ACE/ARB THEARPY RXD/TAKEN: ICD-10-PCS | Mod: CPTII,S$GLB,, | Performed by: PODIATRIST

## 2022-04-20 PROCEDURE — 99213 PR OFFICE/OUTPT VISIT, EST, LEVL III, 20-29 MIN: ICD-10-PCS | Mod: 25,S$GLB,, | Performed by: PODIATRIST

## 2022-04-20 PROCEDURE — 11042 PR DEBRIDEMENT, SKIN, SUB-Q TISSUE,=<20 SQ CM: ICD-10-PCS | Mod: S$GLB,,, | Performed by: PODIATRIST

## 2022-04-20 PROCEDURE — 99213 OFFICE O/P EST LOW 20 MIN: CPT | Mod: 25,S$GLB,, | Performed by: PODIATRIST

## 2022-04-20 PROCEDURE — 3008F PR BODY MASS INDEX (BMI) DOCUMENTED: ICD-10-PCS | Mod: CPTII,S$GLB,, | Performed by: PODIATRIST

## 2022-04-20 PROCEDURE — 11042 DBRDMT SUBQ TIS 1ST 20SQCM/<: CPT | Mod: S$GLB,,, | Performed by: PODIATRIST

## 2022-04-20 PROCEDURE — 1126F PR PAIN SEVERITY QUANTIFIED, NO PAIN PRESENT: ICD-10-PCS | Mod: CPTII,S$GLB,, | Performed by: PODIATRIST

## 2022-04-20 PROCEDURE — 4010F ACE/ARB THERAPY RXD/TAKEN: CPT | Mod: CPTII,S$GLB,, | Performed by: PODIATRIST

## 2022-04-20 PROCEDURE — 1126F AMNT PAIN NOTED NONE PRSNT: CPT | Mod: CPTII,S$GLB,, | Performed by: PODIATRIST

## 2022-04-20 PROCEDURE — 3008F BODY MASS INDEX DOCD: CPT | Mod: CPTII,S$GLB,, | Performed by: PODIATRIST

## 2022-04-20 PROCEDURE — 99999 PR PBB SHADOW E&M-EST. PATIENT-LVL V: CPT | Mod: PBBFAC,,, | Performed by: PODIATRIST

## 2022-04-22 ENCOUNTER — OFFICE VISIT (OUTPATIENT)
Dept: UROLOGY | Facility: CLINIC | Age: 72
End: 2022-04-22
Payer: MEDICARE

## 2022-04-22 VITALS
HEIGHT: 69 IN | HEART RATE: 86 BPM | DIASTOLIC BLOOD PRESSURE: 56 MMHG | BODY MASS INDEX: 40.59 KG/M2 | SYSTOLIC BLOOD PRESSURE: 140 MMHG | WEIGHT: 274.06 LBS

## 2022-04-22 DIAGNOSIS — R35.0 FREQUENCY OF URINATION: Primary | ICD-10-CM

## 2022-04-22 DIAGNOSIS — R97.20 ELEVATED PSA: ICD-10-CM

## 2022-04-22 DIAGNOSIS — E11.49 TYPE II DIABETES MELLITUS WITH NEUROLOGICAL MANIFESTATIONS: ICD-10-CM

## 2022-04-22 LAB
BACTERIA #/AREA URNS AUTO: ABNORMAL /HPF
BILIRUB SERPL-MCNC: ABNORMAL MG/DL
BLOOD URINE, POC: ABNORMAL
CLARITY, POC UA: ABNORMAL
COLOR, POC UA: YELLOW
GLUCOSE UR QL STRIP: 250
KETONES UR QL STRIP: ABNORMAL
LEUKOCYTE ESTERASE URINE, POC: ABNORMAL
MICROSCOPIC COMMENT: ABNORMAL
NITRITE, POC UA: ABNORMAL
PH, POC UA: 5
POC RESIDUAL URINE VOLUME: 237 ML (ref 0–100)
PROTEIN, POC: ABNORMAL
RBC #/AREA URNS AUTO: 28 /HPF (ref 0–4)
SPECIFIC GRAVITY, POC UA: 1.01
SQUAMOUS #/AREA URNS AUTO: 1 /HPF
UROBILINOGEN, POC UA: ABNORMAL
WBC #/AREA URNS AUTO: 92 /HPF (ref 0–5)
WBC CLUMPS UR QL AUTO: ABNORMAL

## 2022-04-22 PROCEDURE — 3288F FALL RISK ASSESSMENT DOCD: CPT | Mod: CPTII,S$GLB,, | Performed by: UROLOGY

## 2022-04-22 PROCEDURE — 3077F PR MOST RECENT SYSTOLIC BLOOD PRESSURE >= 140 MM HG: ICD-10-PCS | Mod: CPTII,S$GLB,, | Performed by: UROLOGY

## 2022-04-22 PROCEDURE — 1159F MED LIST DOCD IN RCRD: CPT | Mod: CPTII,S$GLB,, | Performed by: UROLOGY

## 2022-04-22 PROCEDURE — 3078F DIAST BP <80 MM HG: CPT | Mod: CPTII,S$GLB,, | Performed by: UROLOGY

## 2022-04-22 PROCEDURE — 1101F PT FALLS ASSESS-DOCD LE1/YR: CPT | Mod: CPTII,S$GLB,, | Performed by: UROLOGY

## 2022-04-22 PROCEDURE — 3008F BODY MASS INDEX DOCD: CPT | Mod: CPTII,S$GLB,, | Performed by: UROLOGY

## 2022-04-22 PROCEDURE — 99204 OFFICE O/P NEW MOD 45 MIN: CPT | Mod: S$GLB,,, | Performed by: UROLOGY

## 2022-04-22 PROCEDURE — 87086 URINE CULTURE/COLONY COUNT: CPT | Performed by: UROLOGY

## 2022-04-22 PROCEDURE — 99204 PR OFFICE/OUTPT VISIT, NEW, LEVL IV, 45-59 MIN: ICD-10-PCS | Mod: S$GLB,,, | Performed by: UROLOGY

## 2022-04-22 PROCEDURE — 1126F PR PAIN SEVERITY QUANTIFIED, NO PAIN PRESENT: ICD-10-PCS | Mod: CPTII,S$GLB,, | Performed by: UROLOGY

## 2022-04-22 PROCEDURE — 51798 US URINE CAPACITY MEASURE: CPT | Mod: S$GLB,,, | Performed by: UROLOGY

## 2022-04-22 PROCEDURE — 51798 POCT BLADDER SCAN: ICD-10-PCS | Mod: S$GLB,,, | Performed by: UROLOGY

## 2022-04-22 PROCEDURE — 87088 URINE BACTERIA CULTURE: CPT | Performed by: UROLOGY

## 2022-04-22 PROCEDURE — 3008F PR BODY MASS INDEX (BMI) DOCUMENTED: ICD-10-PCS | Mod: CPTII,S$GLB,, | Performed by: UROLOGY

## 2022-04-22 PROCEDURE — 3078F PR MOST RECENT DIASTOLIC BLOOD PRESSURE < 80 MM HG: ICD-10-PCS | Mod: CPTII,S$GLB,, | Performed by: UROLOGY

## 2022-04-22 PROCEDURE — 81002 URINALYSIS NONAUTO W/O SCOPE: CPT | Mod: S$GLB,,, | Performed by: UROLOGY

## 2022-04-22 PROCEDURE — 1101F PR PT FALLS ASSESS DOC 0-1 FALLS W/OUT INJ PAST YR: ICD-10-PCS | Mod: CPTII,S$GLB,, | Performed by: UROLOGY

## 2022-04-22 PROCEDURE — 99999 PR PBB SHADOW E&M-EST. PATIENT-LVL III: ICD-10-PCS | Mod: PBBFAC,,, | Performed by: UROLOGY

## 2022-04-22 PROCEDURE — 81002 POCT URINE DIPSTICK WITHOUT MICROSCOPE: ICD-10-PCS | Mod: S$GLB,,, | Performed by: UROLOGY

## 2022-04-22 PROCEDURE — 1160F RVW MEDS BY RX/DR IN RCRD: CPT | Mod: CPTII,S$GLB,, | Performed by: UROLOGY

## 2022-04-22 PROCEDURE — 99999 PR PBB SHADOW E&M-EST. PATIENT-LVL III: CPT | Mod: PBBFAC,,, | Performed by: UROLOGY

## 2022-04-22 PROCEDURE — 81001 URINALYSIS AUTO W/SCOPE: CPT | Performed by: UROLOGY

## 2022-04-22 PROCEDURE — 3288F PR FALLS RISK ASSESSMENT DOCUMENTED: ICD-10-PCS | Mod: CPTII,S$GLB,, | Performed by: UROLOGY

## 2022-04-22 PROCEDURE — 87077 CULTURE AEROBIC IDENTIFY: CPT | Performed by: UROLOGY

## 2022-04-22 PROCEDURE — 1159F PR MEDICATION LIST DOCUMENTED IN MEDICAL RECORD: ICD-10-PCS | Mod: CPTII,S$GLB,, | Performed by: UROLOGY

## 2022-04-22 PROCEDURE — 87186 SC STD MICRODIL/AGAR DIL: CPT | Performed by: UROLOGY

## 2022-04-22 PROCEDURE — 3077F SYST BP >= 140 MM HG: CPT | Mod: CPTII,S$GLB,, | Performed by: UROLOGY

## 2022-04-22 PROCEDURE — 1160F PR REVIEW ALL MEDS BY PRESCRIBER/CLIN PHARMACIST DOCUMENTED: ICD-10-PCS | Mod: CPTII,S$GLB,, | Performed by: UROLOGY

## 2022-04-22 PROCEDURE — 1126F AMNT PAIN NOTED NONE PRSNT: CPT | Mod: CPTII,S$GLB,, | Performed by: UROLOGY

## 2022-04-22 RX ORDER — TAMSULOSIN HYDROCHLORIDE 0.4 MG/1
0.8 CAPSULE ORAL DAILY
Qty: 90 CAPSULE | Refills: 3 | Status: ON HOLD | OUTPATIENT
Start: 2022-04-22 | End: 2022-06-20 | Stop reason: SDUPTHER

## 2022-04-22 RX ORDER — TAMSULOSIN HYDROCHLORIDE 0.4 MG/1
CAPSULE ORAL
COMMUNITY
Start: 2021-11-24 | End: 2022-04-22

## 2022-04-22 NOTE — PROGRESS NOTES
Subjective:       Patient ID: Saji Castañeda is a 72 y.o. male.    Chief Complaint: Urinary Frequency     This is a 72 y.o.  male patient that is new to me referred for frequency of urination.  Poorly controlled diabetic on lasix with frequency of urination.  Currently on antibiotic for LE wounds.  Limited mobility.  Significant frequency and nocturia, no incontinence.  Also has intermittency, weak stream, nocturia.  AUA SS 28/6,  and states voided to completion. No hematuria.      IPSS Questionnaire (AUA-SS):  Over the past month    1)  Incomplete Emptying - How often have you had a sensation of not emptying your bladder completely after you finish urinating?  4 - More than half the time   2)  Frequency - How often have you had to urinate again less than two hours after you finished urinating? 4 - More than half the time   3)  Intermittency - How often have you found you stopped and started again several times when you urinated?  5 - Almost always   4) Urgency - How difficult have you found it to postpone urination?  4 - More than half the time   5) Weak Stream - How often have you had a weak urinary stream?  5 - Almost always   6) Straining  - How often have you had to push or strain to begin urination?  2 - Less than half the time   7) Nocturia - How many times did you most typically get up to urinate from the time you went to bed until the time you got up in the morning?  4 - 4 times   Total score:  0-7 mild, 8-19 moderate, 20-35 severe 28   Quality of Life:  6 - Terrible             LAST PSA  Lab Results   Component Value Date    PSA 4.8 (H) 12/23/2005       Lab Results   Component Value Date    CREATININE 1.0 02/23/2022       ---  Past Medical History:   Diagnosis Date    Arthritis     legs    Diabetes mellitus     Diabetes mellitus, type 2     Hyperlipidemia     Osteomyelitis        Past Surgical History:   Procedure Laterality Date    ANGIOGRAPHY OF LOWER EXTREMITY N/A 2/3/2021    Procedure:  "Angiogram Extremity Bilateral;  Surgeon: Ernst Chacko MD;  Location: [facility] OR 2ND FLR;  Service: Peripheral Vascular;  Laterality: N/A;  7.4 mintues fluoroscopy time  816.15 mGy  170.17 Gycm2    AORTOGRAPHY WITH EXTREMITY RUNOFF Bilateral 2/3/2021    Procedure: AORTOGRAM, WITH EXTREMITY RUNOFF;  Surgeon: Ernst Chacko MD;  Location: [facility] OR 2ND FLR;  Service: Peripheral Vascular;  Laterality: Bilateral;    DEBRIDEMENT OF FOOT Left 2/23/2021    Procedure: DEBRIDEMENT, LEFT HEEL;  Surgeon: Mayra Schroeder DPM;  Location: [facility] OR 1ST FLR;  Service: Podiatry;  Laterality: Left;    FOOT AMPUTATION  October 2010    left high midfoot amputation       Family History   Problem Relation Age of Onset    Diabetes Mother     Heart disease Mother     Stroke Sister     Cancer Brother     Diabetes Sister        Social History     Tobacco Use    Smoking status: Never Smoker    Smokeless tobacco: Never Used   Substance Use Topics    Alcohol use: No     Comment: occassional    Drug use: No       Current Outpatient Medications on File Prior to Visit   Medication Sig Dispense Refill    acetaminophen (TYLENOL) 325 MG tablet Take 2 tablets (650 mg total) by mouth every 6 (six) hours as needed for Pain.  0    aspirin (ECOTRIN) 81 MG EC tablet Take 81 mg by mouth once daily.      BD ULTRA-FINE ADITYA PEN NEEDLE 32 gauge x 5/32" Ndle USE FOUR TIMES DAILY WITH MEALS AND NIGHTLY 100 each 0    blood sugar diagnostic (CONTOUR TEST STRIPS) Strp Inject 1 strip into the skin 2 (two) times daily before meals. 100 strip 6    ciprofloxacin HCl (CIPRO) 500 MG tablet Take 1 tablet by mouth 2 (two) times daily.      ELIQUIS 5 mg Tab       furosemide (LASIX) 40 MG tablet Take 40 mg by mouth every other day.      glipiZIDE (GLUCOTROL) 10 MG tablet Take 10 mg by mouth daily with breakfast. HOLD THIS MEDICATION IF YOU ARE SKIPPING A MEAL      ketoconazole (NIZORAL) 2 % cream Apply topically once daily. 60 g 1    " LANTUS SOLOSTAR U-100 INSULIN glargine 100 units/mL (3mL) SubQ pen       losartan-hydrochlorothiazide 100-25 mg (HYZAAR) 100-25 mg per tablet Take 1 tablet by mouth once daily.      melatonin (MELATIN) 3 mg tablet Take 2 tablets (6 mg total) by mouth nightly as needed for Insomnia.  0    metFORMIN (GLUCOPHAGE-XR) 500 MG ER 24hr tablet       metroNIDAZOLE (FLAGYL) 500 MG tablet Take 1 tablet (500 mg total) by mouth every 8 (eight) hours.      MICROLET LANCET Misc   0    multivitamin Tab Take 1 tablet by mouth once daily.      multivitamin with minerals tablet 1 tablet 2 TIMES DAILY (route: oral)      pantoprazole (PROTONIX) 40 MG tablet Take 40 mg by mouth once daily.      polyethylene glycol (GLYCOLAX) 17 gram PwPk Take 17 g by mouth once daily.  0    rosuvastatin (CRESTOR) 40 MG Tab Take 40 mg by mouth once daily.      senna (SENOKOT) 8.6 mg tablet Take 1 tablet by mouth 2 (two) times daily.      triamcinolone acetonide 0.1% (KENALOG) 0.1 % cream Apply topically 2 (two) times daily. Apply to affected area twice daily as directed. 453.6 g 0    VITAMIN C WITH CARLOS HIPS 500 MG tablet Take 500 mg by mouth 2 (two) times daily.      VITAMIN D2 1,250 mcg (50,000 unit) capsule Take 50,000 Units by mouth every 7 days.      zinc sulfate (ZINCATE) 220 (50) mg capsule Take 1 capsule (220 mg total) by mouth once daily.      [DISCONTINUED] tamsulosin (FLOMAX) 0.4 mg Cap 1 capsule  at bedtime  TAKE 1 CAPSULE BY MOUTH AT BEDTIME      gabapentin (NEURONTIN) 100 MG capsule Take 2 capsules (200 mg total) by mouth 2 (two) times daily. 120 capsule 1    insulin aspart U-100 (NOVOLOG) 100 unit/mL (3 mL) InPn pen Inject 0-5 Units into the skin before meals and at bedtime as needed (Hyperglycemia).  0    insulin detemir U-100 (LEVEMIR FLEXTOUCH) 100 unit/mL (3 mL) SubQ InPn pen Inject 55 Units into the skin every evening.  0    isosorbide-hydrALAZINE 20-37.5 mg (BIDIL) 20-37.5 mg Tab Take 1 tablet by mouth 3 (three)  times daily. 90 tablet 2     No current facility-administered medications on file prior to visit.       Review of patient's allergies indicates:  No Known Allergies    Review of Systems   Constitutional: Negative for activity change, chills and fever.   HENT: Negative for congestion.    Respiratory: Negative for cough, chest tightness and shortness of breath.    Cardiovascular: Negative for chest pain and palpitations.   Gastrointestinal: Negative for abdominal distention, abdominal pain, nausea and vomiting.   Genitourinary: Positive for frequency. Negative for difficulty urinating, flank pain, hematuria, penile pain, scrotal swelling and testicular pain.   Musculoskeletal: Negative for gait problem.       Objective:      Physical Exam  Constitutional:       Appearance: Normal appearance.   HENT:      Head: Normocephalic.   Pulmonary:      Effort: Pulmonary effort is normal.      Breath sounds: Normal breath sounds.   Abdominal:      General: Abdomen is flat. Bowel sounds are normal.      Palpations: Abdomen is soft.      Tenderness: There is no abdominal tenderness. There is no right CVA tenderness, left CVA tenderness or guarding.   Musculoskeletal:      Cervical back: Normal range of motion.   Skin:     General: Skin is warm.   Neurological:      Mental Status: He is alert.         Assessment:     Problem Noted   Frequency of Urination 4/22/2022    Frequency of urination, on tamsulosin, likely incomplete emptying from diabetic state and condition with lasix  AUA SS 28/6           Plan:     1. Repeat PSA given elevated in 2005  2. Double tamsulosin, side effects discussed  3. Follow up in 1 month  4. UA and urine culture given possible infection     Hai Watson MD

## 2022-04-25 DIAGNOSIS — N39.0 URINARY TRACT INFECTION WITHOUT HEMATURIA, SITE UNSPECIFIED: Primary | ICD-10-CM

## 2022-04-25 LAB — BACTERIA UR CULT: ABNORMAL

## 2022-04-25 RX ORDER — NITROFURANTOIN 25; 75 MG/1; MG/1
100 CAPSULE ORAL 2 TIMES DAILY
Qty: 14 CAPSULE | Refills: 0 | Status: SHIPPED | OUTPATIENT
Start: 2022-04-25 | End: 2022-05-02

## 2022-05-03 ENCOUNTER — EXTERNAL HOME HEALTH (OUTPATIENT)
Dept: HOME HEALTH SERVICES | Facility: HOSPITAL | Age: 72
End: 2022-05-03
Payer: MEDICARE

## 2022-05-04 ENCOUNTER — TELEPHONE (OUTPATIENT)
Dept: PODIATRY | Facility: CLINIC | Age: 72
End: 2022-05-04
Payer: MEDICARE

## 2022-05-04 NOTE — TELEPHONE ENCOUNTER
Patient was notified Dr. Kenyon is out of clinic to H/h will continue to see him . Please until on 05/18/2022 next visit with Dr. Kenyon

## 2022-05-05 ENCOUNTER — TELEPHONE (OUTPATIENT)
Dept: PODIATRY | Facility: CLINIC | Age: 72
End: 2022-05-05
Payer: MEDICARE

## 2022-05-05 NOTE — TELEPHONE ENCOUNTER
----- Message from Blas Mcbride LPN sent at 5/5/2022  6:52 AM CDT -----  Hi, Can you inform pt about his luzma tomorrow

## 2022-05-05 NOTE — TELEPHONE ENCOUNTER
Called patient to inform him of the appointment and the need to have it done, he verbalized understanding and stated he will be there at 8:00 am at 5th floor vascular lab on 5/6/22

## 2022-05-05 NOTE — TELEPHONE ENCOUNTER
Left voice message for pt to give the office a call back at [phone]. Called to reminding pt about MARIO appointment in the vascular lab on 5/6.

## 2022-05-05 NOTE — TELEPHONE ENCOUNTER
After Visit Summary   9/17/2018    Jo Ann Patel    MRN: [id number]           Patient Information     Date Of Birth          1986        Visit Information        Provider Department      9/17/2018 9:40 AM Rebecca Gil, PT; [place] [employer] [facility]        Today's Diagnoses     Cervical radiculopathy        Lumbago           Follow-ups after your visit        Your next 10 appointments already scheduled     Sep 27, 2018  9:00 AM CDT   TAYA Spine with Rebecca Gil, PT   [facility] ([facility]  )    [address]  [facility] [address]   [phone]            Oct 01, 2018  9:40 AM CDT   TAYA Spine with Rebecca Gil, PT   [facility] ([facility]  )    [address]  [facility] [address]   [phone]            Oct 08, 2018  9:00 AM CDT   TAYA Spine with Rebecca Gil, PT   [facility] ([facility]  )    [address]  [facility] [address]   [phone]            Oct 15, 2018  9:00 AM CDT   TAAY Spine with Rebecca Gil, PT   [facility] ([facility]  )    [address]  [facility] [address]   [phone]            Nov 12, 2018  9:00 AM CST   Nurse Only with NURSE ONLY WOMENS   [facility]    [address]  [facility] [address]   [phone]              Who to contact     If you have questions or need follow up information about today's clinic visit or your schedule please contact [facility] directly at [phone].  Normal or non-critical lab and imaging results will be communicated to you by MyChart, letter or phone within 4 business days after the clinic has received the results. If you do not hear from us within 7  ----- Message from Blas Mcbride LPN sent at 5/5/2022  6:52 AM CDT -----  Hi, Can you inform pt about his luzma tomorrow     "days, please contact the clinic through Qubrit or phone. If you have a critical or abnormal lab result, we will notify you by phone as soon as possible.  Submit refill requests through Qubrit or call your pharmacy and they will forward the refill request to us. Please allow 3 business days for your refill to be completed.          Additional Information About Your Visit        [employer] Information     Qubrit lets you send messages to your doctor, view your test results, renew your prescriptions, schedule appointments and more. To sign up, go to [email or web address] . Click on \"Log in\" on the left side of the screen, which will take you to the Welcome page. Then click on \"Sign up Now\" on the right side of the page.     You will be asked to enter the access code listed below, as well as some personal information. Please follow the directions to create your username and password.     Your access code is: [email or web address]  Expires: 12/10/2018  3:10 PM     Your access code will  in 90 days. If you need help or a new code, please call your [place] clinic or [phone].        Care EveryWhere ID     This is your Care EveryWhere ID. This could be used by other organizations to access your [place] medical records  [id number]        Your Vitals Were     Last Period                   2018            Blood Pressure from Last 3 Encounters:   18 102/66   18 94/62   18 112/70    Weight from Last 3 Encounters:   18 53.6 kg (118 lb 1.6 oz)   18 54 kg (119 lb)   18 54.8 kg (120 lb 12.8 oz)              We Performed the Following     TAYA PROGRESS NOTES REPORT     MANUAL THER TECH,1+REGIONS,EA 15 MIN     THERAPEUTIC EXERCISES        Primary Care Provider Office Phone # Fax #    [facility] [phone] [address]  [facility] [address]        Equal Access to Services     IRAM GORDON AH: liliana Arevalo, " anna gilmorelinda missyin hayaan adeeg kharash la'aan ah. So [facility] [phone].    ATENCIÓN: Si hosea anthony, tiene a schafer disposición servicios gratuitos de asistencia lingüística. Ralph al [phone].    We comply with applicable federal civil rights laws and [place] laws. We do not discriminate on the basis of race, color, national origin, age, disability, sex, sexual orientation, or gender identity.            Thank you!     Thank you for choosing [facility]  for your care. Our goal is always to provide you with excellent care. Hearing back from our patients is one way we can continue to improve our services. Please take a few minutes to complete the written survey that you may receive in the mail after your visit with us. Thank you!             Your Updated Medication List - Protect others around you: Learn how to safely use, store and throw away your medicines at www.disposemymeds.org.          This list is accurate as of 9/17/18 10:59 AM.  Always use your most recent med list.                   Brand Name Dispense Instructions for use Diagnosis    * medroxyPROGESTERone 150 MG/ML injection    DEPO-PROVERA    1 mL    Inject 1 mL (150 mg) into the muscle every 3 months    Encounter for initial prescription of injectable contraceptive       * medroxyPROGESTERone 150 MG/ML injection    DEPO-PROVERA    3 mL    Inject 1 mL (150 mg) into the muscle every 3 months    Encounter for other contraceptive management       * Notice:  This list has 2 medication(s) that are the same as other medications prescribed for you. Read the directions carefully, and ask your doctor or other care provider to review them with you.

## 2022-05-13 ENCOUNTER — TELEPHONE (OUTPATIENT)
Dept: PODIATRY | Facility: CLINIC | Age: 72
End: 2022-05-13
Payer: MEDICARE

## 2022-05-13 NOTE — TELEPHONE ENCOUNTER
called patient to reschedule no answer unable to leave a voice mail. Appointment reschedule with Dr White on Friday 20/22

## 2022-05-16 ENCOUNTER — TELEPHONE (OUTPATIENT)
Dept: PODIATRY | Facility: CLINIC | Age: 72
End: 2022-05-16
Payer: MEDICARE

## 2022-05-16 NOTE — TELEPHONE ENCOUNTER
Message and contact info left on recorder to please continue HH and we would send updated orders at his next visit on Friday.  Note added to appt

## 2022-05-16 NOTE — TELEPHONE ENCOUNTER
----- Message from Chrystal Downing sent at 5/16/2022 10:51 AM CDT -----  Zeb case management w/ [facility] calling regarding stated it's time to recertified pt for wound care.  She want to know if you want pt to continue care with them.  If so, she need updated wound care orders.  call back [phone] and fax  [phone]

## 2022-05-24 NOTE — PROGRESS NOTES
Subjective:      Patient ID: Saji Castañeda is a 72 y.o. male.    Chief Complaint: Follow-up (Wound care ), Foot Ulcer (Left leg ), and Dressing Change (Unna boot )    Saji is a 72 y.o. male who presents to the clinic for evaluation and treatment of high risk feet. Saji has a past medical history of Arthritis, Diabetes mellitus, Diabetes mellitus, type 2, Hyperlipidemia, and Osteomyelitis. The patient's chief complaint is diabetic foot ulcer, L heel and leg . This patient has documented high risk feet requiring routine maintenance secondary to peripheral neuropathy.  No issues w/ abx       PCP: Serge Holland MD    Date Last Seen by PCP: Patient does not have a PCP or has not yet seen their PCP     Chief Complaint   Patient presents with    Follow-up     Wound care     Foot Ulcer     Left leg     Dressing Change     Unna boot      History of Trauma: negative  Sign of Infection: none    Hemoglobin A1C   Date Value Ref Range Status   12/08/2020 7.9 (H) 4.0 - 5.6 % Final     Comment:     ADA Screening Guidelines:  5.7-6.4%  Consistent with prediabetes  >or=6.5%  Consistent with diabetes  High levels of fetal hemoglobin interfere with the HbA1C  assay. Heterozygous hemoglobin variants (HbS, HgC, etc)do  not significantly interfere with this assay.   However, presence of multiple variants may affect accuracy.     12/05/2019 9.8 (H) 4.0 - 5.6 % Final     Comment:     ADA Screening Guidelines:  5.7-6.4%  Consistent with prediabetes  >or=6.5%  Consistent with diabetes  High levels of fetal hemoglobin interfere with the HbA1C  assay. Heterozygous hemoglobin variants (HbS, HgC, etc)do  not significantly interfere with this assay.   However, presence of multiple variants may affect accuracy.     10/15/2018 7.7 (H) 4.0 - 5.6 % Final     Comment:     ADA Screening Guidelines:  5.7-6.4%  Consistent with prediabetes  >or=6.5%  Consistent with diabetes  High levels of fetal hemoglobin interfere with the  "HbA1C  assay. Heterozygous hemoglobin variants (HbS, HgC, etc)do  not significantly interfere with this assay.   However, presence of multiple variants may affect accuracy.         Review of Systems   Constitutional: Negative for chills and fever.   Cardiovascular: Positive for leg swelling. Negative for chest pain and claudication.   Respiratory: Negative for cough and shortness of breath.    Skin: Positive for color change, dry skin, nail changes (R foot only. L foot amputation) and poor wound healing (L leg and heel ulcers). Negative for flushing, itching and rash.   Musculoskeletal: Negative for back pain, falls, gout and joint pain.        L foot amputation (Choparts)    Gastrointestinal: Negative for nausea and vomiting.   Neurological: Positive for numbness and sensory change. Negative for paresthesias.   Psychiatric/Behavioral: Negative for altered mental status.           Objective:       Vitals:    22 1359   Weight: 125.2 kg (276 lb)   Height: 5' 9" (1.753 m)   PainSc: 0-No pain        Physical Exam  Vitals reviewed.   Constitutional:       Appearance: He is well-developed.   HENT:      Head: Normocephalic.   Cardiovascular:      Comments: DP/PT pulses diminished b/l CRT < 3 sec to tips of toes.    Pulmonary:      Effort: No respiratory distress.   Musculoskeletal:      Right lower le+ Edema present.      Left lower le+ Edema present.      Comments: L foot chopart's amputation.      Skin:     General: Skin is warm and dry.      Coloration: Skin is not jaundiced or pale.      Findings: No bruising or erythema.      Comments: Overall thickening of skin of b/l legs and ankles    Mild hyperpigmentation to skin of both ankles and/or feet, consistent with hemosiderin deposits.     Diminished pedal hair b/l.     Ulcer L heel: 4.3x3.2x0.3  Depth: bone  Periphery: hyperkeratotic rim - improved  Base: granular with exposed bone(improved)  Drainage: Serosanguinous  SOI:   - stable, leg swelling    Ulcer " anterior L mid leg: healed     Ulcer Posterior L mid leg: 0.4 x 0.3x 0.1cm  Depth: subcutaneous tissue layer  Periphery: hyperkeratotic rim  Base: granular with biofilm  Drainage: Serosanguinous  SOI: none   Neurological:      Mental Status: He is alert and oriented to person, place, and time.      Sensory: Sensory deficit present.      Comments: Light touch, proprioception, and sharp/dull sensation are all intact bilaterally. Protective threshold with the [place]-Wienstein monofilament is diminished bilaterally.    Psychiatric:         Behavior: Behavior normal.         Thought Content: Thought content normal.         Judgment: Judgment normal.               Assessment:       Encounter Diagnoses   Name Primary?    Type II diabetes mellitus with neurological manifestations Yes    Leg swelling     PVD (peripheral vascular disease)     Heel ulcer, left, with necrosis of bone     Skin ulcer of left calf with fat layer exposed              Plan:       Saji was seen today for follow-up, foot ulcer and dressing change.    Diagnoses and all orders for this visit:    Type II diabetes mellitus with neurological manifestations  -     Ambulatory referral/consult to Urology; Future  -     Ambulatory referral/consult to Wound Clinic; Future    Leg swelling    PVD (peripheral vascular disease)    Heel ulcer, left, with necrosis of bone    Skin ulcer of left calf with fat layer exposed      I counseled the patient on his conditions, their implications and medical management.     heel and leg ulcers assessed and examined.     Nonviable tissues were debrided beyond the level of  bone utilizing a  sterile No. 15 scalpel and forceps. Minimal bleeding controlled with direct pressure  The patient tolerated this well.     Applied HB to all wounds,     Ashanti Nolen MA assisted w/ application of football dressing under my direct supervision. Darco shoe applied to offload the area. Advised patient that this should be worn on the  affected foot at all times when ambulating     I discussed with the  patient  signs and symptoms of infection including redness, drainage, purulence, odor, pain, elevated BS, streaking, fever, chills, etc . Patient is   to seek medical attention (ER or urgent care) if these symptoms occur      RTC 2 weeks, sooner PRN

## 2022-05-27 ENCOUNTER — TELEPHONE (OUTPATIENT)
Dept: PODIATRY | Facility: CLINIC | Age: 72
End: 2022-05-27
Payer: MEDICARE

## 2022-05-27 NOTE — TELEPHONE ENCOUNTER
"Spoke to Tani, pt's HH nurse that states, Pt's insurance company states they have denied pt's HH visits because they are " no longer medically neccessary". Pt's nurse states they have initiated an appeal with the company however, they will no longer be able to see the pt until after that is resolved.     Tani ( Nurse) [phone]    Staff Message sent to Dr. Kostas Alvares  "

## 2022-05-27 NOTE — TELEPHONE ENCOUNTER
----- Message from Dimitri Diaz sent at 5/27/2022  4:27 PM CDT -----  Contact: Bret w [facility] @ [phone]   Caller calling to inform insurance has denied further visit , HH is unable to visit until the issue is  resolved.

## 2022-06-05 ENCOUNTER — TELEPHONE (OUTPATIENT)
Dept: PODIATRY | Facility: CLINIC | Age: 72
End: 2022-06-05
Payer: MEDICARE

## 2022-06-06 ENCOUNTER — TELEPHONE (OUTPATIENT)
Dept: PODIATRY | Facility: CLINIC | Age: 72
End: 2022-06-06
Payer: MEDICARE

## 2022-06-07 ENCOUNTER — OFFICE VISIT (OUTPATIENT)
Dept: PODIATRY | Facility: CLINIC | Age: 72
End: 2022-06-07
Payer: MEDICARE

## 2022-06-07 VITALS — BODY MASS INDEX: 40.47 KG/M2 | WEIGHT: 274.06 LBS

## 2022-06-07 DIAGNOSIS — I73.9 PVD (PERIPHERAL VASCULAR DISEASE): ICD-10-CM

## 2022-06-07 DIAGNOSIS — E11.49 TYPE II DIABETES MELLITUS WITH NEUROLOGICAL MANIFESTATIONS: Primary | ICD-10-CM

## 2022-06-07 DIAGNOSIS — M79.89 LEG SWELLING: ICD-10-CM

## 2022-06-07 DIAGNOSIS — L97.424 HEEL ULCER, LEFT, WITH NECROSIS OF BONE: ICD-10-CM

## 2022-06-07 DIAGNOSIS — L97.222 SKIN ULCER OF LEFT CALF WITH FAT LAYER EXPOSED: ICD-10-CM

## 2022-06-07 DIAGNOSIS — I73.9 PERIPHERAL ARTERIAL DISEASE: ICD-10-CM

## 2022-06-07 PROCEDURE — 4010F PR ACE/ARB THEARPY RXD/TAKEN: ICD-10-PCS | Mod: CPTII,S$GLB,, | Performed by: PODIATRIST

## 2022-06-07 PROCEDURE — 99999 PR PBB SHADOW E&M-EST. PATIENT-LVL IV: ICD-10-PCS | Mod: PBBFAC,,, | Performed by: PODIATRIST

## 2022-06-07 PROCEDURE — 99499 UNLISTED E&M SERVICE: CPT | Mod: S$GLB,,, | Performed by: PODIATRIST

## 2022-06-07 PROCEDURE — 4010F ACE/ARB THERAPY RXD/TAKEN: CPT | Mod: CPTII,S$GLB,, | Performed by: PODIATRIST

## 2022-06-07 PROCEDURE — 99999 PR PBB SHADOW E&M-EST. PATIENT-LVL IV: CPT | Mod: PBBFAC,,, | Performed by: PODIATRIST

## 2022-06-07 PROCEDURE — 11044 DBRDMT BONE 1ST 20 SQ CM/<: CPT | Mod: S$GLB,,, | Performed by: PODIATRIST

## 2022-06-07 PROCEDURE — 1125F AMNT PAIN NOTED PAIN PRSNT: CPT | Mod: CPTII,S$GLB,, | Performed by: PODIATRIST

## 2022-06-07 PROCEDURE — 11044 PR DEBRIDEMENT, SKIN, SUB-Q TISSUE,MUSCLE,BONE,=<20 SQ CM: ICD-10-PCS | Mod: S$GLB,,, | Performed by: PODIATRIST

## 2022-06-07 PROCEDURE — 99499 NO LOS: ICD-10-PCS | Mod: S$GLB,,, | Performed by: PODIATRIST

## 2022-06-07 PROCEDURE — 1125F PR PAIN SEVERITY QUANTIFIED, PAIN PRESENT: ICD-10-PCS | Mod: CPTII,S$GLB,, | Performed by: PODIATRIST

## 2022-06-07 PROCEDURE — 3008F BODY MASS INDEX DOCD: CPT | Mod: CPTII,S$GLB,, | Performed by: PODIATRIST

## 2022-06-07 PROCEDURE — 3008F PR BODY MASS INDEX (BMI) DOCUMENTED: ICD-10-PCS | Mod: CPTII,S$GLB,, | Performed by: PODIATRIST

## 2022-06-07 RX ORDER — LOSARTAN POTASSIUM 100 MG/1
TABLET ORAL
Status: ON HOLD | COMMUNITY
Start: 2022-03-24 | End: 2022-06-20

## 2022-06-07 RX ORDER — HYDROCHLOROTHIAZIDE 25 MG/1
25 TABLET ORAL DAILY
Status: ON HOLD | COMMUNITY
Start: 2022-03-24 | End: 2022-06-24 | Stop reason: SDUPTHER

## 2022-06-07 NOTE — PROGRESS NOTES
Subjective:      Patient ID: Saji Castañeda is a 72 y.o. male.    Chief Complaint: Post-op Evaluation (1st visit  left foot ), Foot Pain, and Wound Care (Unna boot left foot and leg)    Saji is a 72 y.o. male who presents to the clinic for evaluation and treatment of high risk feet. Saji has a past medical history of Arthritis, Diabetes mellitus, Diabetes mellitus, type 2, Hyperlipidemia, and Osteomyelitis. The patient's chief complaint is diabetic foot ulcer, L heel and leg . This patient has documented high risk feet requiring routine maintenance secondary to peripheral neuropathy.  No issues w/ abx   6/7/22: Saji Castañeda presebnts for f/u l heel ulcer. Has been lost to follow up since April. Patient currently  denies nausea, vomiting, fever, chills, fatigue, and  diarrhea. Blood sugars have been stable      PCP: Serge Holland MD    Date Last Seen by PCP: Patient does not have a PCP or has not yet seen their PCP     Chief Complaint   Patient presents with    Post-op Evaluation     1st visit  left foot     Foot Pain    Wound Care     Unna boot left foot and leg     History of Trauma: negative  Sign of Infection: none    Hemoglobin A1C   Date Value Ref Range Status   12/08/2020 7.9 (H) 4.0 - 5.6 % Final     Comment:     ADA Screening Guidelines:  5.7-6.4%  Consistent with prediabetes  >or=6.5%  Consistent with diabetes  High levels of fetal hemoglobin interfere with the HbA1C  assay. Heterozygous hemoglobin variants (HbS, HgC, etc)do  not significantly interfere with this assay.   However, presence of multiple variants may affect accuracy.     12/05/2019 9.8 (H) 4.0 - 5.6 % Final     Comment:     ADA Screening Guidelines:  5.7-6.4%  Consistent with prediabetes  >or=6.5%  Consistent with diabetes  High levels of fetal hemoglobin interfere with the HbA1C  assay. Heterozygous hemoglobin variants (HbS, HgC, etc)do  not significantly interfere with this assay.   However, presence of multiple  variants may affect accuracy.     10/15/2018 7.7 (H) 4.0 - 5.6 % Final     Comment:     ADA Screening Guidelines:  5.7-6.4%  Consistent with prediabetes  >or=6.5%  Consistent with diabetes  High levels of fetal hemoglobin interfere with the HbA1C  assay. Heterozygous hemoglobin variants (HbS, HgC, etc)do  not significantly interfere with this assay.   However, presence of multiple variants may affect accuracy.         Review of Systems   Constitutional: Negative for chills and fever.   Cardiovascular: Positive for leg swelling. Negative for chest pain and claudication.   Respiratory: Negative for cough and shortness of breath.    Skin: Positive for color change, dry skin, nail changes (R foot only. L foot amputation) and poor wound healing (L leg and heel ulcers). Negative for flushing, itching and rash.   Musculoskeletal: Negative for back pain, falls, gout and joint pain.        L foot amputation (Choparts)    Gastrointestinal: Negative for nausea and vomiting.   Neurological: Positive for numbness and sensory change. Negative for paresthesias.   Psychiatric/Behavioral: Negative for altered mental status.           Objective:       Vitals:    22 1301   Weight: 124.3 kg (274 lb 0.5 oz)   PainSc:   9   PainLoc: Foot        Physical Exam  Vitals reviewed.   Constitutional:       Appearance: He is well-developed.   HENT:      Head: Normocephalic.   Cardiovascular:      Comments: DP/PT pulses diminished b/l CRT < 3 sec to tips of toes.    Pulmonary:      Effort: No respiratory distress.   Musculoskeletal:      Right lower le+ Edema present.      Left lower le+ Edema present.      Comments: L foot chopart's amputation.      Skin:     General: Skin is warm and dry.      Coloration: Skin is not jaundiced or pale.      Findings: No bruising or erythema.      Comments: Overall thickening of skin of b/l legs and ankles    Mild hyperpigmentation to skin of both ankles and/or feet, consistent with hemosiderin  deposits.     Diminished pedal hair b/l.     Ulcer L heel: 4.5x3.5x0.3  Depth: bone  Periphery: hyperkeratotic rim w/ minimal maceration  Base: granular with exposed bone  Drainage: Serosanguinous    Ulcer anterior L mid leg: healed     Ulcer Posterior L mid leg: 0.5 x 0.5x 0.3cm  Depth: subcutaneous tissue layer  Periphery: hyperkeratotic rim  Base: granular with biofilm  Drainage: Serosanguinous  SOI: none     Neurological:      Mental Status: He is alert and oriented to person, place, and time.      Sensory: Sensory deficit present.      Comments: Light touch, proprioception, and sharp/dull sensation are all intact bilaterally. Protective threshold with the [place]-Wienstein monofilament is diminished bilaterally.    Psychiatric:         Behavior: Behavior normal.         Thought Content: Thought content normal.         Judgment: Judgment normal.               Assessment:       Encounter Diagnoses   Name Primary?    Type II diabetes mellitus with neurological manifestations Yes    Skin ulcer of left calf with fat layer exposed     PVD (peripheral vascular disease)     Heel ulcer, left, with necrosis of bone     Leg swelling     Peripheral arterial disease              Plan:       Saji was seen today for post-op evaluation, foot pain and wound care.    Diagnoses and all orders for this visit:    Type II diabetes mellitus with neurological manifestations    Skin ulcer of left calf with fat layer exposed    PVD (peripheral vascular disease)    Heel ulcer, left, with necrosis of bone    Leg swelling    Peripheral arterial disease      I counseled the patient on his conditions, their implications and medical management.     heel and leg ulcers assessed and examined.   Patient has been lost to follow up since April   Has had [facility] d/c 2/2 insurance issues   Current dressing has been in place for 2-3 weeks   Leg swelling, non pittting noted, likely due to lack of compression and constant  leg dependency   Discussed  again the chronicity of his L heel condition. Discussed BKA   Pt not interested at this time     Nonviable tissues were debrided beyond the level of  bone utilizing a  sterile No. 15 scalpel and forceps. Minimal bleeding controlled with direct pressure  The patient tolerated this well.     Applied HB to all wounds, With patient's permission, I applied an Unna boot dressing (bi-layered pink Coflex brand)  using a spiral technique beginning with the foam layer followed by the cohesive bandage avoiding creases or folds.  The wrap was started behind the first metatarsal and ended below the tibial tubercle of the knee.  There was overlap of each turn half the width of the previous turn in order to better control edema. Patient tolerated well and related comfort to the L lower extremity .     Marko Marquez MA assisted w/ application of football dressing under my direct supervision. Darco shoe applied to offload the area. Advised patient that this should be worn on the affected foot at all times when ambulating     I discussed with the  patient  signs and symptoms of infection including redness, drainage, purulence, odor, pain, elevated BS, streaking, fever, chills, etc . Patient is   to seek medical attention (ER or urgent care) if these symptoms occur      RTC 1week, sooner PRN

## 2022-06-15 ENCOUNTER — OFFICE VISIT (OUTPATIENT)
Dept: PODIATRY | Facility: CLINIC | Age: 72
End: 2022-06-15
Payer: MEDICARE

## 2022-06-15 ENCOUNTER — HOSPITAL ENCOUNTER (INPATIENT)
Facility: HOSPITAL | Age: 72
LOS: 23 days | Discharge: NURSING, REHAB OR LONG-TERM CARE | DRG: 629 | End: 2022-07-08
Attending: EMERGENCY MEDICINE | Admitting: HOSPITALIST
Payer: MEDICARE

## 2022-06-15 VITALS
WEIGHT: 274.06 LBS | SYSTOLIC BLOOD PRESSURE: 179 MMHG | HEIGHT: 69 IN | HEART RATE: 43 BPM | DIASTOLIC BLOOD PRESSURE: 52 MMHG | BODY MASS INDEX: 40.59 KG/M2

## 2022-06-15 DIAGNOSIS — M79.89 LEG SWELLING: ICD-10-CM

## 2022-06-15 DIAGNOSIS — E11.621 ISCHEMIC ULCER DIABETIC FOOT: ICD-10-CM

## 2022-06-15 DIAGNOSIS — L97.922 LEG ULCER, LEFT, WITH FAT LAYER EXPOSED: ICD-10-CM

## 2022-06-15 DIAGNOSIS — L97.424 HEEL ULCER, LEFT, WITH NECROSIS OF BONE: ICD-10-CM

## 2022-06-15 DIAGNOSIS — M79.672 FOOT PAIN, LEFT: ICD-10-CM

## 2022-06-15 DIAGNOSIS — I73.9 PVD (PERIPHERAL VASCULAR DISEASE): ICD-10-CM

## 2022-06-15 DIAGNOSIS — M86.9 OSTEOMYELITIS OF LEFT LOWER EXTREMITY: ICD-10-CM

## 2022-06-15 DIAGNOSIS — M79.89 LEFT LEG SWELLING: ICD-10-CM

## 2022-06-15 DIAGNOSIS — M86.172 ACUTE OSTEOMYELITIS OF CALCANEUM, LEFT: Primary | ICD-10-CM

## 2022-06-15 DIAGNOSIS — R35.0 FREQUENCY OF URINATION: ICD-10-CM

## 2022-06-15 DIAGNOSIS — R00.1 BRADYCARDIA: ICD-10-CM

## 2022-06-15 DIAGNOSIS — I73.9 PAD (PERIPHERAL ARTERY DISEASE): ICD-10-CM

## 2022-06-15 DIAGNOSIS — L97.509 ISCHEMIC ULCER DIABETIC FOOT: ICD-10-CM

## 2022-06-15 DIAGNOSIS — M79.605 LEG PAIN, DIFFUSE, LEFT: ICD-10-CM

## 2022-06-15 DIAGNOSIS — M79.606 LEG PAIN: ICD-10-CM

## 2022-06-15 DIAGNOSIS — E11.49 TYPE II DIABETES MELLITUS WITH NEUROLOGICAL MANIFESTATIONS: Primary | ICD-10-CM

## 2022-06-15 LAB
ALBUMIN SERPL BCP-MCNC: 2.8 G/DL (ref 3.5–5.2)
ALP SERPL-CCNC: 87 U/L (ref 55–135)
ALT SERPL W/O P-5'-P-CCNC: 11 U/L (ref 10–44)
ANION GAP SERPL CALC-SCNC: 10 MMOL/L (ref 8–16)
AST SERPL-CCNC: 12 U/L (ref 10–40)
BACTERIA #/AREA URNS AUTO: ABNORMAL /HPF
BASOPHILS # BLD AUTO: 0.04 K/UL (ref 0–0.2)
BASOPHILS NFR BLD: 0.6 % (ref 0–1.9)
BILIRUB SERPL-MCNC: 0.4 MG/DL (ref 0.1–1)
BILIRUB UR QL STRIP: NEGATIVE
BUN SERPL-MCNC: 10 MG/DL (ref 8–23)
CALCIUM SERPL-MCNC: 9.2 MG/DL (ref 8.7–10.5)
CHLORIDE SERPL-SCNC: 105 MMOL/L (ref 95–110)
CLARITY UR REFRACT.AUTO: CLEAR
CO2 SERPL-SCNC: 25 MMOL/L (ref 23–29)
COLOR UR AUTO: YELLOW
CREAT SERPL-MCNC: 1.1 MG/DL (ref 0.5–1.4)
CRP SERPL-MCNC: 26.7 MG/L (ref 0–8.2)
CTP QC/QA: YES
DIFFERENTIAL METHOD: ABNORMAL
EOSINOPHIL # BLD AUTO: 0.3 K/UL (ref 0–0.5)
EOSINOPHIL NFR BLD: 4.5 % (ref 0–8)
ERYTHROCYTE [DISTWIDTH] IN BLOOD BY AUTOMATED COUNT: 17.2 % (ref 11.5–14.5)
ERYTHROCYTE [SEDIMENTATION RATE] IN BLOOD BY WESTERGREN METHOD: >120 MM/HR (ref 0–23)
EST. GFR  (AFRICAN AMERICAN): >60 ML/MIN/1.73 M^2
EST. GFR  (NON AFRICAN AMERICAN): >60 ML/MIN/1.73 M^2
GLUCOSE SERPL-MCNC: 68 MG/DL (ref 70–110)
GLUCOSE UR QL STRIP: NEGATIVE
HCT VFR BLD AUTO: 35 % (ref 40–54)
HGB BLD-MCNC: 10.7 G/DL (ref 14–18)
HGB UR QL STRIP: NEGATIVE
HYALINE CASTS UR QL AUTO: 1 /LPF
IMM GRANULOCYTES # BLD AUTO: 0.02 K/UL (ref 0–0.04)
IMM GRANULOCYTES NFR BLD AUTO: 0.3 % (ref 0–0.5)
KETONES UR QL STRIP: NEGATIVE
LACTATE SERPL-SCNC: 1.4 MMOL/L (ref 0.5–2.2)
LEUKOCYTE ESTERASE UR QL STRIP: ABNORMAL
LYMPHOCYTES # BLD AUTO: 1.7 K/UL (ref 1–4.8)
LYMPHOCYTES NFR BLD: 25.6 % (ref 18–48)
MAGNESIUM SERPL-MCNC: 1.7 MG/DL (ref 1.6–2.6)
MCH RBC QN AUTO: 25.2 PG (ref 27–31)
MCHC RBC AUTO-ENTMCNC: 30.6 G/DL (ref 32–36)
MCV RBC AUTO: 82 FL (ref 82–98)
MICROSCOPIC COMMENT: ABNORMAL
MONOCYTES # BLD AUTO: 0.7 K/UL (ref 0.3–1)
MONOCYTES NFR BLD: 9.9 % (ref 4–15)
NEUTROPHILS # BLD AUTO: 3.9 K/UL (ref 1.8–7.7)
NEUTROPHILS NFR BLD: 59.1 % (ref 38–73)
NITRITE UR QL STRIP: NEGATIVE
NRBC BLD-RTO: 0 /100 WBC
PH UR STRIP: 7 [PH] (ref 5–8)
PHOSPHATE SERPL-MCNC: 3.2 MG/DL (ref 2.7–4.5)
PLATELET # BLD AUTO: 341 K/UL (ref 150–450)
PMV BLD AUTO: 9.7 FL (ref 9.2–12.9)
POTASSIUM SERPL-SCNC: 3.9 MMOL/L (ref 3.5–5.1)
PROT SERPL-MCNC: 8 G/DL (ref 6–8.4)
PROT UR QL STRIP: ABNORMAL
RBC # BLD AUTO: 4.25 M/UL (ref 4.6–6.2)
RBC #/AREA URNS AUTO: 4 /HPF (ref 0–4)
SARS-COV-2 RDRP RESP QL NAA+PROBE: NEGATIVE
SODIUM SERPL-SCNC: 140 MMOL/L (ref 136–145)
SP GR UR STRIP: 1.01 (ref 1–1.03)
SQUAMOUS #/AREA URNS AUTO: 0 /HPF
URN SPEC COLLECT METH UR: ABNORMAL
WBC # BLD AUTO: 6.67 K/UL (ref 3.9–12.7)
WBC #/AREA URNS AUTO: 46 /HPF (ref 0–5)

## 2022-06-15 PROCEDURE — 12000002 HC ACUTE/MED SURGE SEMI-PRIVATE ROOM

## 2022-06-15 PROCEDURE — 25000003 PHARM REV CODE 250: Performed by: STUDENT IN AN ORGANIZED HEALTH CARE EDUCATION/TRAINING PROGRAM

## 2022-06-15 PROCEDURE — 99223 PR INITIAL HOSPITAL CARE,LEVL III: ICD-10-PCS | Mod: ,,, | Performed by: HOSPITALIST

## 2022-06-15 PROCEDURE — 80053 COMPREHEN METABOLIC PANEL: CPT | Performed by: STUDENT IN AN ORGANIZED HEALTH CARE EDUCATION/TRAINING PROGRAM

## 2022-06-15 PROCEDURE — 99285 EMERGENCY DEPT VISIT HI MDM: CPT | Mod: 25

## 2022-06-15 PROCEDURE — 87086 URINE CULTURE/COLONY COUNT: CPT | Performed by: STUDENT IN AN ORGANIZED HEALTH CARE EDUCATION/TRAINING PROGRAM

## 2022-06-15 PROCEDURE — 1159F PR MEDICATION LIST DOCUMENTED IN MEDICAL RECORD: ICD-10-PCS | Mod: CPTII,S$GLB,, | Performed by: PODIATRIST

## 2022-06-15 PROCEDURE — 25000003 PHARM REV CODE 250: Performed by: HOSPITALIST

## 2022-06-15 PROCEDURE — 1125F PR PAIN SEVERITY QUANTIFIED, PAIN PRESENT: ICD-10-PCS | Mod: CPTII,S$GLB,, | Performed by: PODIATRIST

## 2022-06-15 PROCEDURE — C9399 UNCLASSIFIED DRUGS OR BIOLOG: HCPCS | Performed by: HOSPITALIST

## 2022-06-15 PROCEDURE — 3077F SYST BP >= 140 MM HG: CPT | Mod: CPTII,S$GLB,, | Performed by: PODIATRIST

## 2022-06-15 PROCEDURE — 85652 RBC SED RATE AUTOMATED: CPT | Performed by: STUDENT IN AN ORGANIZED HEALTH CARE EDUCATION/TRAINING PROGRAM

## 2022-06-15 PROCEDURE — 85025 COMPLETE CBC W/AUTO DIFF WBC: CPT | Performed by: STUDENT IN AN ORGANIZED HEALTH CARE EDUCATION/TRAINING PROGRAM

## 2022-06-15 PROCEDURE — 86140 C-REACTIVE PROTEIN: CPT | Performed by: STUDENT IN AN ORGANIZED HEALTH CARE EDUCATION/TRAINING PROGRAM

## 2022-06-15 PROCEDURE — 3008F BODY MASS INDEX DOCD: CPT | Mod: CPTII,S$GLB,, | Performed by: PODIATRIST

## 2022-06-15 PROCEDURE — 99214 PR OFFICE/OUTPT VISIT, EST, LEVL IV, 30-39 MIN: ICD-10-PCS | Mod: S$GLB,,, | Performed by: PODIATRIST

## 2022-06-15 PROCEDURE — 84100 ASSAY OF PHOSPHORUS: CPT | Performed by: STUDENT IN AN ORGANIZED HEALTH CARE EDUCATION/TRAINING PROGRAM

## 2022-06-15 PROCEDURE — 3008F PR BODY MASS INDEX (BMI) DOCUMENTED: ICD-10-PCS | Mod: CPTII,S$GLB,, | Performed by: PODIATRIST

## 2022-06-15 PROCEDURE — 1125F AMNT PAIN NOTED PAIN PRSNT: CPT | Mod: CPTII,S$GLB,, | Performed by: PODIATRIST

## 2022-06-15 PROCEDURE — 4010F ACE/ARB THERAPY RXD/TAKEN: CPT | Mod: CPTII,S$GLB,, | Performed by: PODIATRIST

## 2022-06-15 PROCEDURE — 83735 ASSAY OF MAGNESIUM: CPT | Performed by: STUDENT IN AN ORGANIZED HEALTH CARE EDUCATION/TRAINING PROGRAM

## 2022-06-15 PROCEDURE — 3078F DIAST BP <80 MM HG: CPT | Mod: CPTII,S$GLB,, | Performed by: PODIATRIST

## 2022-06-15 PROCEDURE — 99285 PR EMERGENCY DEPT VISIT,LEVEL V: ICD-10-PCS | Mod: CS,,, | Performed by: EMERGENCY MEDICINE

## 2022-06-15 PROCEDURE — 3077F PR MOST RECENT SYSTOLIC BLOOD PRESSURE >= 140 MM HG: ICD-10-PCS | Mod: CPTII,S$GLB,, | Performed by: PODIATRIST

## 2022-06-15 PROCEDURE — 3078F PR MOST RECENT DIASTOLIC BLOOD PRESSURE < 80 MM HG: ICD-10-PCS | Mod: CPTII,S$GLB,, | Performed by: PODIATRIST

## 2022-06-15 PROCEDURE — 81001 URINALYSIS AUTO W/SCOPE: CPT | Performed by: STUDENT IN AN ORGANIZED HEALTH CARE EDUCATION/TRAINING PROGRAM

## 2022-06-15 PROCEDURE — 99999 PR PBB SHADOW E&M-EST. PATIENT-LVL IV: ICD-10-PCS | Mod: PBBFAC,,, | Performed by: PODIATRIST

## 2022-06-15 PROCEDURE — 99223 1ST HOSP IP/OBS HIGH 75: CPT | Mod: ,,, | Performed by: HOSPITALIST

## 2022-06-15 PROCEDURE — 87040 BLOOD CULTURE FOR BACTERIA: CPT | Mod: 59 | Performed by: STUDENT IN AN ORGANIZED HEALTH CARE EDUCATION/TRAINING PROGRAM

## 2022-06-15 PROCEDURE — 99999 PR PBB SHADOW E&M-EST. PATIENT-LVL IV: CPT | Mod: PBBFAC,,, | Performed by: PODIATRIST

## 2022-06-15 PROCEDURE — 63600175 PHARM REV CODE 636 W HCPCS: Performed by: HOSPITALIST

## 2022-06-15 PROCEDURE — 99214 OFFICE O/P EST MOD 30 MIN: CPT | Mod: S$GLB,,, | Performed by: PODIATRIST

## 2022-06-15 PROCEDURE — 99285 EMERGENCY DEPT VISIT HI MDM: CPT | Mod: CS,,, | Performed by: EMERGENCY MEDICINE

## 2022-06-15 PROCEDURE — 4010F PR ACE/ARB THEARPY RXD/TAKEN: ICD-10-PCS | Mod: CPTII,S$GLB,, | Performed by: PODIATRIST

## 2022-06-15 PROCEDURE — 1159F MED LIST DOCD IN RCRD: CPT | Mod: CPTII,S$GLB,, | Performed by: PODIATRIST

## 2022-06-15 PROCEDURE — U0002 COVID-19 LAB TEST NON-CDC: HCPCS | Performed by: STUDENT IN AN ORGANIZED HEALTH CARE EDUCATION/TRAINING PROGRAM

## 2022-06-15 PROCEDURE — 83605 ASSAY OF LACTIC ACID: CPT | Performed by: STUDENT IN AN ORGANIZED HEALTH CARE EDUCATION/TRAINING PROGRAM

## 2022-06-15 RX ORDER — IBUPROFEN 200 MG
24 TABLET ORAL
Status: DISCONTINUED | OUTPATIENT
Start: 2022-06-15 | End: 2022-06-15

## 2022-06-15 RX ORDER — IBUPROFEN 200 MG
16 TABLET ORAL
Status: DISCONTINUED | OUTPATIENT
Start: 2022-06-15 | End: 2022-06-15

## 2022-06-15 RX ORDER — ACETAMINOPHEN 325 MG/1
650 TABLET ORAL EVERY 4 HOURS PRN
Status: DISCONTINUED | OUTPATIENT
Start: 2022-06-15 | End: 2022-07-08 | Stop reason: HOSPADM

## 2022-06-15 RX ORDER — IBUPROFEN 200 MG
24 TABLET ORAL
Status: DISCONTINUED | OUTPATIENT
Start: 2022-06-15 | End: 2022-06-18

## 2022-06-15 RX ORDER — ASPIRIN 81 MG/1
81 TABLET ORAL DAILY
Status: DISCONTINUED | OUTPATIENT
Start: 2022-06-17 | End: 2022-07-08 | Stop reason: HOSPADM

## 2022-06-15 RX ORDER — NALOXONE HCL 0.4 MG/ML
0.02 VIAL (ML) INJECTION
Status: DISCONTINUED | OUTPATIENT
Start: 2022-06-15 | End: 2022-07-08 | Stop reason: HOSPADM

## 2022-06-15 RX ORDER — OXYCODONE AND ACETAMINOPHEN 5; 325 MG/1; MG/1
1 TABLET ORAL EVERY 6 HOURS PRN
Status: DISCONTINUED | OUTPATIENT
Start: 2022-06-15 | End: 2022-07-08 | Stop reason: HOSPADM

## 2022-06-15 RX ORDER — HYDRALAZINE HYDROCHLORIDE 50 MG/1
50 TABLET, FILM COATED ORAL EVERY 8 HOURS PRN
Status: DISCONTINUED | OUTPATIENT
Start: 2022-06-15 | End: 2022-06-16

## 2022-06-15 RX ORDER — OXYCODONE AND ACETAMINOPHEN 5; 325 MG/1; MG/1
1 TABLET ORAL
Status: COMPLETED | OUTPATIENT
Start: 2022-06-15 | End: 2022-06-15

## 2022-06-15 RX ORDER — IPRATROPIUM BROMIDE AND ALBUTEROL SULFATE 2.5; .5 MG/3ML; MG/3ML
3 SOLUTION RESPIRATORY (INHALATION) EVERY 6 HOURS PRN
Status: DISCONTINUED | OUTPATIENT
Start: 2022-06-15 | End: 2022-07-08 | Stop reason: HOSPADM

## 2022-06-15 RX ORDER — SODIUM CHLORIDE 0.9 % (FLUSH) 0.9 %
10 SYRINGE (ML) INJECTION
Status: DISCONTINUED | OUTPATIENT
Start: 2022-06-15 | End: 2022-07-08 | Stop reason: HOSPADM

## 2022-06-15 RX ORDER — PROCHLORPERAZINE EDISYLATE 5 MG/ML
5 INJECTION INTRAMUSCULAR; INTRAVENOUS EVERY 6 HOURS PRN
Status: DISCONTINUED | OUTPATIENT
Start: 2022-06-15 | End: 2022-07-08 | Stop reason: HOSPADM

## 2022-06-15 RX ORDER — GABAPENTIN 100 MG/1
200 CAPSULE ORAL 2 TIMES DAILY
Status: DISCONTINUED | OUTPATIENT
Start: 2022-06-15 | End: 2022-07-08 | Stop reason: HOSPADM

## 2022-06-15 RX ORDER — MAG HYDROX/ALUMINUM HYD/SIMETH 200-200-20
30 SUSPENSION, ORAL (FINAL DOSE FORM) ORAL
Status: DISCONTINUED | OUTPATIENT
Start: 2022-06-16 | End: 2022-06-21

## 2022-06-15 RX ORDER — LOSARTAN POTASSIUM AND HYDROCHLOROTHIAZIDE 25; 100 MG/1; MG/1
1 TABLET ORAL DAILY
Status: DISCONTINUED | OUTPATIENT
Start: 2022-06-16 | End: 2022-06-20

## 2022-06-15 RX ORDER — INSULIN ASPART 100 [IU]/ML
1-10 INJECTION, SOLUTION INTRAVENOUS; SUBCUTANEOUS
Status: DISCONTINUED | OUTPATIENT
Start: 2022-06-15 | End: 2022-06-18

## 2022-06-15 RX ORDER — TALC
6 POWDER (GRAM) TOPICAL NIGHTLY PRN
Status: DISCONTINUED | OUTPATIENT
Start: 2022-06-15 | End: 2022-07-08 | Stop reason: HOSPADM

## 2022-06-15 RX ORDER — HYDRALAZINE HYDROCHLORIDE 25 MG/1
50 TABLET, FILM COATED ORAL ONCE
Status: COMPLETED | OUTPATIENT
Start: 2022-06-15 | End: 2022-06-15

## 2022-06-15 RX ORDER — GLUCAGON 1 MG
1 KIT INJECTION
Status: DISCONTINUED | OUTPATIENT
Start: 2022-06-15 | End: 2022-06-18

## 2022-06-15 RX ORDER — TAMSULOSIN HYDROCHLORIDE 0.4 MG/1
0.8 CAPSULE ORAL DAILY
Status: DISCONTINUED | OUTPATIENT
Start: 2022-06-16 | End: 2022-06-20

## 2022-06-15 RX ORDER — ATORVASTATIN CALCIUM 20 MG/1
80 TABLET, FILM COATED ORAL DAILY
Status: DISCONTINUED | OUTPATIENT
Start: 2022-06-16 | End: 2022-07-08 | Stop reason: HOSPADM

## 2022-06-15 RX ORDER — SUCRALFATE 1 G/10ML
1 SUSPENSION ORAL EVERY 6 HOURS
Status: DISCONTINUED | OUTPATIENT
Start: 2022-06-16 | End: 2022-06-21

## 2022-06-15 RX ORDER — ONDANSETRON 2 MG/ML
4 INJECTION INTRAMUSCULAR; INTRAVENOUS EVERY 8 HOURS PRN
Status: DISCONTINUED | OUTPATIENT
Start: 2022-06-15 | End: 2022-07-08 | Stop reason: HOSPADM

## 2022-06-15 RX ORDER — IBUPROFEN 200 MG
16 TABLET ORAL
Status: DISCONTINUED | OUTPATIENT
Start: 2022-06-15 | End: 2022-06-18

## 2022-06-15 RX ADMIN — HYDRALAZINE HYDROCHLORIDE 50 MG: 25 TABLET, FILM COATED ORAL at 08:06

## 2022-06-15 RX ADMIN — INSULIN DETEMIR 15 UNITS: 100 INJECTION, SOLUTION SUBCUTANEOUS at 11:06

## 2022-06-15 RX ADMIN — GABAPENTIN 200 MG: 100 CAPSULE ORAL at 11:06

## 2022-06-15 RX ADMIN — SUCRALFATE 1 G: 1 SUSPENSION ORAL at 11:06

## 2022-06-15 RX ADMIN — INSULIN ASPART 2 UNITS: 100 INJECTION, SOLUTION INTRAVENOUS; SUBCUTANEOUS at 11:06

## 2022-06-15 RX ADMIN — OXYCODONE HYDROCHLORIDE AND ACETAMINOPHEN 1 TABLET: 5; 325 TABLET ORAL at 06:06

## 2022-06-15 RX ADMIN — OXYCODONE HYDROCHLORIDE AND ACETAMINOPHEN 1 TABLET: 5; 325 TABLET ORAL at 11:06

## 2022-06-15 NOTE — PROGRESS NOTES
Subjective:      Patient ID: Saji Castañeda is a 72 y.o. male.    Chief Complaint: Foot Ulcer (Left foot) and Diabetes Mellitus (Pcp - Serge Holland MD - last week)    Saji is a 72 y.o. male who presents to the clinic for evaluation and treatment of high risk feet. Saji has a past medical history of Arthritis, Diabetes mellitus, Diabetes mellitus, type 2, Hyperlipidemia, and Osteomyelitis. The patient's chief complaint is diabetic foot ulcer, L heel and leg . This patient has documented high risk feet requiring routine maintenance secondary to peripheral neuropathy.  No issues w/ abx   6/7/22: Saji Castañeda presebnts for f/u l heel ulcer. Has been lost to follow up since April. Patient currently  denies nausea, vomiting, fever, chills, fatigue, and  diarrhea. Blood sugars have been stable  6/15/22: Saji Castañeda Patient has been in football dressing/unna, x 1 week with out issues       PCP: Serge Holland MD    Date Last Seen by PCP: Patient does not have a PCP or has not yet seen their PCP     Chief Complaint   Patient presents with    Foot Ulcer     Left foot    Diabetes Mellitus     Pcp - Serge Holland MD - last week     History of Trauma: negative  Sign of Infection: none    Hemoglobin A1C   Date Value Ref Range Status   12/08/2020 7.9 (H) 4.0 - 5.6 % Final     Comment:     ADA Screening Guidelines:  5.7-6.4%  Consistent with prediabetes  >or=6.5%  Consistent with diabetes  High levels of fetal hemoglobin interfere with the HbA1C  assay. Heterozygous hemoglobin variants (HbS, HgC, etc)do  not significantly interfere with this assay.   However, presence of multiple variants may affect accuracy.     12/05/2019 9.8 (H) 4.0 - 5.6 % Final     Comment:     ADA Screening Guidelines:  5.7-6.4%  Consistent with prediabetes  >or=6.5%  Consistent with diabetes  High levels of fetal hemoglobin interfere with the HbA1C  assay. Heterozygous hemoglobin variants (HbS, HgC, etc)do  not  "significantly interfere with this assay.   However, presence of multiple variants may affect accuracy.     10/15/2018 7.7 (H) 4.0 - 5.6 % Final     Comment:     ADA Screening Guidelines:  5.7-6.4%  Consistent with prediabetes  >or=6.5%  Consistent with diabetes  High levels of fetal hemoglobin interfere with the HbA1C  assay. Heterozygous hemoglobin variants (HbS, HgC, etc)do  not significantly interfere with this assay.   However, presence of multiple variants may affect accuracy.         Review of Systems   Constitutional: Negative for chills and fever.   Cardiovascular: Positive for leg swelling. Negative for chest pain and claudication.   Respiratory: Negative for cough and shortness of breath.    Skin: Positive for color change, dry skin, nail changes (R foot only. L foot amputation) and poor wound healing (L leg and heel ulcers). Negative for flushing, itching and rash.   Musculoskeletal: Negative for back pain, falls, gout and joint pain.        L foot amputation (Choparts)    Gastrointestinal: Negative for nausea and vomiting.   Neurological: Positive for numbness and sensory change. Negative for paresthesias.   Psychiatric/Behavioral: Negative for altered mental status.           Objective:       Vitals:    06/15/22 1307   BP: (!) 179/52   Pulse: (!) 43   Weight: 124.3 kg (274 lb 0.5 oz)   Height: 5' 9" (1.753 m)   PainSc: 10-Worst pain ever        Physical Exam  Vitals reviewed.   Constitutional:       Appearance: He is well-developed.   HENT:      Head: Normocephalic.   Cardiovascular:      Comments: DP/PT pulses diminished b/l CRT < 3 sec to tips of toes.    Pulmonary:      Effort: No respiratory distress.   Musculoskeletal:      Right lower le+ Edema present.      Left lower le+ Edema present.      Comments: L foot chopart's amputation.      Skin:     General: Skin is warm and dry.      Coloration: Skin is not jaundiced or pale.      Findings: No bruising or erythema.       Diminished pedal hair " b/l.     Ulcer L heel: 4.5x3.5x0.3  Depth: bone  Periphery: hyperkeratotic rim w/ ++ maceration  Base: granular with exposed bone++  Drainage: Serosanguinous copious       Ulcer Posterior L mid leg: 0.5 x 0.5x 0.5cm  Depth: subcutaneous tissue layer  Periphery: hyperkeratotic rim ++maceration  Base: granular with biofilm  Drainage: thick brownish ,malodorous   SOI: odor, depth     Neurological:      Mental Status: He is alert and oriented to person, place, and time.      Sensory: Sensory deficit present.      Comments: Light touch, proprioception, and sharp/dull sensation are all intact bilaterally. Protective threshold with the [place] monofilament is diminished bilaterally.    Psychiatric:         Behavior: Behavior normal.         Thought Content: Thought content normal.         Judgment: Judgment normal.               Assessment:       Encounter Diagnoses   Name Primary?    Type II diabetes mellitus with neurological manifestations Yes    PVD (peripheral vascular disease)     Heel ulcer, left, with necrosis of bone     Leg swelling     Leg ulcer, left, with fat layer exposed     Left leg swelling     Leg pain, diffuse, left              Plan:       Saji was seen today for foot ulcer and diabetes mellitus.    Diagnoses and all orders for this visit:    Type II diabetes mellitus with neurological manifestations    PVD (peripheral vascular disease)    Heel ulcer, left, with necrosis of bone    Leg swelling    Leg ulcer, left, with fat layer exposed    Left leg swelling    Leg pain, diffuse, left      I counseled the patient on his conditions, their implications and medical management.  L foot/leg extremely malodorous, odor noted while standing several feet from patient   Leg swelling has improved however the entire leg is ++++TTP   Posterior leg ulceration w/ increased drainage and depth   L heel stump lesion w/ increased exposed bone   Pt requires IV abx, Leg US, MRI  Escorted to ED by Katarzyna Arteaga  LPN for further evaluation

## 2022-06-15 NOTE — ED TRIAGE NOTES
Saji Castañeda, a 72 y.o. male presents to the ED w/ complaint of worsening left foot infection. Sent from podiatry office. Pt also c/o severe pain to left leg that radiates down to foot. Pt has amputation of all toes to left foot. Pt arrived with leg wrapped in gauze, yellow drainage noted bottom of foot.     Triage note:  Chief Complaint   Patient presents with    Wound Check     Sent from podiatry and stated in pain. L toes amputated     Review of patient's allergies indicates:  No Known Allergies  Past Medical History:   Diagnosis Date    Arthritis     legs    Diabetes mellitus     Diabetes mellitus, type 2     Hyperlipidemia     Osteomyelitis        Patient identifiers verified verbally with patient and correct for Saji Castañeda.    LOC/ APPEARANCE: The patient is AAOx4. Pt is speaking appropriately, no slurred speech. Pt changed into hospital gown. Continuous cardiac monitor, cont pulse ox, and auto BP cuff applied to patient. Pt is clean and well groomed. No JVD visible. Pt reports pain level of 10/10. Pt updated on POC. Bed low and locked with side rails up x2, call bell in pt reach.  SKIN: Positive for wound to left foot, positive for amputation of all toes to left foot.   RESPIRATORY: Airway is open and patent. Respirations-spontaneous, unlabored, regular rate, equal bilaterally on inspiration and expiration. No accessory muscle use noted.  CARDIAC: Patient is bradycardic at baseline. No peripheral edema noted, and patient has no c/o chest pain. Peripheral pulses present equal and strong throughout.  ABDOMEN: Soft and non-tender to palpation with no distention noted. Pt has no complaints of abnormal bowel movements, denies blood in stool. Pt reports normal appetite.   NEUROLOGIC: Eyes open spontaneously and facial expression symmetrical. Pt behavior appropriate to situation, and pt follows commands. Pt reports sensation present in all extremities when touched with a finger, denies any numbness or  tingling. Positive for BLE edema  MUSCULOSKELETAL: Spontaneous movement noted to all extremities.   : Positive for urinary frequency.

## 2022-06-15 NOTE — ED PROVIDER NOTES
Encounter Date: 6/15/2022       History     Chief Complaint   Patient presents with    Wound Check     Sent from podiatry and stated in pain. L toes amputated     71yo male with PMH of arthritis, HLD, DM-2 c/b peripheral neuropathy and osteomyelitis s/p left high midfoot amputation who was referred to the ED from podiatry clinic for evaluation of diabetic left foot wound. He was seen in podiatry clinic on 6/7/22 and was placed in football dressing/unna with plan for 1 wk follow-up today. He now reports there has been malodorous yellow drainage from the heel wound over the past 3-4 days. He denies fevers, chills, nausea, vomiting. Podiatry was concerned for osteo and per their note, recommended patient be brought to the ED for further management.         Review of patient's allergies indicates:  No Known Allergies  Past Medical History:   Diagnosis Date    Arthritis     legs    Diabetes mellitus     Diabetes mellitus, type 2     Hyperlipidemia     Osteomyelitis      Past Surgical History:   Procedure Laterality Date    ANGIOGRAPHY OF LOWER EXTREMITY N/A 2/3/2021    Procedure: Angiogram Extremity Bilateral;  Surgeon: Ernst Chacko MD;  Location: [facility] OR [address] [place] [address];  Service: Peripheral Vascular;  Laterality: N/A;  7.4 mintues fluoroscopy time  816.15 mGy  170.17 Gycm2    AORTOGRAPHY WITH EXTREMITY RUNOFF Bilateral 2/3/2021    Procedure: AORTOGRAM, WITH EXTREMITY RUNOFF;  Surgeon: Ernst Chacko MD;  Location: [facility] OR [address] [place] [address];  Service: Peripheral Vascular;  Laterality: Bilateral;    DEBRIDEMENT OF FOOT Left 2/23/2021    Procedure: DEBRIDEMENT, LEFT HEEL;  Surgeon: Mayra Schroeder DPM;  Location: [facility] OR [address] [place] [address];  Service: Podiatry;  Laterality: Left;    FOOT AMPUTATION  October 2010    left high midfoot amputation     Family History   Problem Relation Age of Onset    Diabetes Mother     Heart disease Mother     Stroke Sister     Cancer Brother     Diabetes Sister      Social History      Tobacco Use    Smoking status: Never Smoker    Smokeless tobacco: Never Used   Substance Use Topics    Alcohol use: No     Comment: occassional    Drug use: No     Review of Systems   Constitutional: Negative for chills and fever.   HENT: Negative for congestion and rhinorrhea.    Eyes: Negative for pain and visual disturbance.   Respiratory: Negative for cough and shortness of breath.    Cardiovascular: Negative for chest pain and palpitations.   Gastrointestinal: Negative for abdominal pain, nausea and vomiting.   Genitourinary: Negative for difficulty urinating and dysuria.   Musculoskeletal: Negative for joint swelling and neck pain.        Foot pain   Skin: Positive for color change and wound.   Neurological: Negative for light-headedness and headaches.       Physical Exam     Initial Vitals [06/15/22 1347]   BP Pulse Resp Temp SpO2   135/70 (!) 55 16 99 °F (37.2 °C) 96 %      MAP       --         Physical Exam    Nursing note and vitals reviewed.  Constitutional: He is not diaphoretic. No distress.   HENT:   Head: Normocephalic and atraumatic.   Mouth/Throat: Oropharynx is clear and moist.   Eyes: Conjunctivae and EOM are normal.   Neck: Neck supple.   Normal range of motion.  Cardiovascular: Normal rate, regular rhythm and normal heart sounds.   Pulmonary/Chest: Breath sounds normal. He has no wheezes. He has no rhonchi. He has no rales.   Abdominal: Abdomen is soft. He exhibits no distension. There is no abdominal tenderness.   Musculoskeletal:         General: Tenderness present. Normal range of motion.      Cervical back: Normal range of motion and neck supple.     Neurological: He is alert and oriented to person, place, and time.   Skin: Skin is warm and dry.   Left heel ulcer with visible bone. Hyperkeratotic tissue at margins. No visible active drainage. Minimal malodor. L posterior calf thickened skin with maceration. No active drainage. (See photos in Media tab)         ED Course    Procedures  Labs Reviewed   CBC W/ AUTO DIFFERENTIAL - Abnormal; Notable for the following components:       Result Value    RBC 4.25 (*)     Hemoglobin 10.7 (*)     Hematocrit 35.0 (*)     MCH 25.2 (*)     MCHC 30.6 (*)     RDW 17.2 (*)     All other components within normal limits   COMPREHENSIVE METABOLIC PANEL - Abnormal; Notable for the following components:    Glucose 68 (*)     Albumin 2.8 (*)     All other components within normal limits   URINALYSIS, REFLEX TO URINE CULTURE - Abnormal; Notable for the following components:    Protein, UA 2+ (*)     Leukocytes, UA 1+ (*)     All other components within normal limits    Narrative:     Specimen Source->Urine   SEDIMENTATION RATE - Abnormal; Notable for the following components:    Sed Rate >120 (*)     All other components within normal limits   C-REACTIVE PROTEIN - Abnormal; Notable for the following components:    CRP 26.7 (*)     All other components within normal limits   URINALYSIS MICROSCOPIC - Abnormal; Notable for the following components:    WBC, UA 46 (*)     All other components within normal limits    Narrative:     Specimen Source->Urine   CULTURE, BLOOD   CULTURE, BLOOD   CULTURE, URINE   LACTIC ACID, PLASMA   MAGNESIUM   PHOSPHORUS   SARS-COV-2 RDRP GENE    Narrative:     This test utilizes isothermal nucleic acid amplification   technology to detect the SARS-CoV-2 RdRp nucleic acid segment.   The analytical sensitivity (limit of detection) is 125 genome   equivalents/mL.   A POSITIVE result implies infection with the SARS-CoV-2 virus;   the patient is presumed to be contagious.     A NEGATIVE result means that SARS-CoV-2 nucleic acids are not   present above the limit of detection. A NEGATIVE result should be   treated as presumptive. It does not rule out the possibility of   COVID-19 and should not be the sole basis for treatment decisions.   If COVID-19 is strongly suspected based on clinical and exposure   history, re-testing using an  alternate molecular assay should be   considered.   This test is only for use under the Food and Drug   Administration s Emergency Use Authorization (EUA).   Commercial kits are provided by [employer].   Performance characteristics of the EUA have been independently   verified by [facility] Department of   Pathology and Laboratory Medicine.   _________________________________________________________________   The authorized Fact Sheet for Healthcare Providers and the authorized Fact   Sheet for Patients of the ID NOW COVID-19 are available on the FDA   website:     https://www.fda.gov/media/237521/download  https://www.fda.gov/media/518805/download           POCT GLUCOSE MONITORING CONTINUOUS          Imaging Results           US Lower Extremity Arteries Left (Final result)  Result time 06/15/22 18:08:46    Final result by Juan Hernandez MD (06/15/22 18:08:46)                 Impression:      Monophasic waveform within the left distal lower extremity, consistent with atherosclerotic disease.  Areas of high-grade stenosis within the popliteal artery.    The left peroneal artery is not visualized, and may be occluded.    This report was flagged in Epic as abnormal.    Electronically signed by resident: Lissett Kearney  Date:    06/15/2022  Time:    17:59    Electronically signed by: Juan Hernandez MD  Date:    06/15/2022  Time:    18:08             Narrative:    EXAMINATION:  US LOWER EXTREMITY ARTERIES LEFT    CLINICAL HISTORY:  Pain in leg, unspecified    TECHNIQUE:  Left lower extremity arterial duplex ultrasound examination performed. Multiple gray scale and color doppler images were obtained in addition to waveform analysis.    COMPARISON:  Ultrasound lower extremity arteries bilateral 12/09/2020    FINDINGS:  The peak systolic velocities on the left are as follows, in centimeters/second:    Common femoral artery: 174, triphasic    Superficial femoral artery, proximal: 130,  triphasic    Superficial femoral artery, mid portion: 177, triphasic    Superficial femoral artery, distal: 144, triphasic    Popliteal artery: 140 proximally, monophasic and 89 distally, monophasic    Posterior tibial artery: 86, monophasic    Anterior tibial artery: 134, monophasic                               X-Ray Tibia Fibula 2 View Left (Final result)  Result time 06/15/22 15:38:24    Final result by Leonel Palma III, MD (06/15/22 15:38:24)                 Impression:      Ulcer crater down to bone with osteomyelitis of the posterior calcaneus.      Electronically signed by: Leonel Palma MD  Date:    06/15/2022  Time:    15:38             Narrative:    EXAMINATION:  XR TIBIA FIBULA 2 VIEW LEFT    CLINICAL HISTORY:  Pain in left foot    FINDINGS:  Tib fib two views left:    There is DJD of the knee and ankle.  There is amputation at the hindfoot midfoot junction.  There is a large ulcer crater with calcaneal destruction posteriorly.  There are diabetic vascular calcifications and there is DJD and neuropathic change.                                 Medications   aspirin EC tablet 81 mg (has no administration in time range)   sodium chloride 0.9% flush 10 mL (has no administration in time range)   albuterol-ipratropium 2.5 mg-0.5 mg/3 mL nebulizer solution 3 mL (has no administration in time range)   melatonin tablet 6 mg (has no administration in time range)   ondansetron injection 4 mg (has no administration in time range)   prochlorperazine injection Soln 5 mg (has no administration in time range)   acetaminophen tablet 650 mg (has no administration in time range)   naloxone 0.4 mg/mL injection 0.02 mg (has no administration in time range)   glucose chewable tablet 16 g (has no administration in time range)   glucose chewable tablet 24 g (has no administration in time range)   glucagon (human recombinant) injection 1 mg (has no administration in time range)   dextrose 10% bolus 125 mL (has no  administration in time range)   dextrose 10% bolus 250 mL (has no administration in time range)   insulin aspart U-100 pen 1-10 Units (has no administration in time range)   hydrALAZINE tablet 50 mg (has no administration in time range)   gabapentin capsule 200 mg (has no administration in time range)   losartan-hydrochlorothiazide 100-25 mg per tablet 1 tablet (has no administration in time range)   sucralfate 100 mg/mL suspension 1 g (has no administration in time range)   aluminum-magnesium hydroxide-simethicone 200-200-20 mg/5 mL suspension 30 mL (has no administration in time range)   atorvastatin tablet 80 mg (has no administration in time range)   tamsulosin 24 hr capsule 0.8 mg (has no administration in time range)   insulin detemir U-100 pen 15 Units (has no administration in time range)   oxyCODONE-acetaminophen 5-325 mg per tablet 1 tablet (has no administration in time range)   oxyCODONE-acetaminophen 5-325 mg per tablet 1 tablet (1 tablet Oral Given 6/15/22 1813)   hydrALAZINE tablet 50 mg (50 mg Oral Given 6/15/22 2028)     Medical Decision Making:   History:   Old Medical Records: I decided to obtain old medical records.  Initial Assessment:   71yo male with PMH of arthritis, HLD, DM-2 c/b peripheral neuropathy and osteomyelitis s/p left high midfoot amputation who was referred to the ED from podiatry clinic for evaluation of diabetic left foot wound.   Differential Diagnosis:   Osteomyelitis  Bacteremia  Cellulitis  Abscess  Chronic diabetic ulcer  Clinical Tests:   Lab Tests: Ordered and Reviewed  Radiological Study: Ordered and Reviewed  ED Management:  Patient is hypertensive but otherwise hemodynamically stable.  Percocet given for pain. EKG shows mildly bradycardic A-Fib with no ST changes.  He appears comfortable and nontoxic.  XR L tib-fib consistent with osteomyelitis. ESR and CRP elevated. He is afebrile, hemodynamically stable with no leukocytosis. Podiatry consulted and are planning to  biopsy in the AM. They requested to hold off on abx until after biopsy.  Patient admitted to Hospital Medicine for further management.  Patient is in agreement with plan.  All questions answered.                       Clinical Impression:   Final diagnoses:  [M79.672] Foot pain, left  [M79.606] Leg pain          ED Disposition Condition    Admit               Aniya Melton MD  Resident  06/15/22 9425

## 2022-06-16 LAB
ALBUMIN SERPL BCP-MCNC: 2.6 G/DL (ref 3.5–5.2)
ALP SERPL-CCNC: 84 U/L (ref 55–135)
ALT SERPL W/O P-5'-P-CCNC: 7 U/L (ref 10–44)
ANION GAP SERPL CALC-SCNC: 10 MMOL/L (ref 8–16)
AST SERPL-CCNC: 11 U/L (ref 10–40)
BACTERIA UR CULT: NO GROWTH
BASOPHILS # BLD AUTO: 0.02 K/UL (ref 0–0.2)
BASOPHILS NFR BLD: 0.4 % (ref 0–1.9)
BILIRUB SERPL-MCNC: 0.5 MG/DL (ref 0.1–1)
BUN SERPL-MCNC: 9 MG/DL (ref 8–23)
CALCIUM SERPL-MCNC: 9.1 MG/DL (ref 8.7–10.5)
CHLORIDE SERPL-SCNC: 104 MMOL/L (ref 95–110)
CO2 SERPL-SCNC: 24 MMOL/L (ref 23–29)
CREAT SERPL-MCNC: 0.9 MG/DL (ref 0.5–1.4)
DIFFERENTIAL METHOD: ABNORMAL
EOSINOPHIL # BLD AUTO: 0.2 K/UL (ref 0–0.5)
EOSINOPHIL NFR BLD: 5.3 % (ref 0–8)
ERYTHROCYTE [DISTWIDTH] IN BLOOD BY AUTOMATED COUNT: 17.1 % (ref 11.5–14.5)
EST. GFR  (AFRICAN AMERICAN): >60 ML/MIN/1.73 M^2
EST. GFR  (NON AFRICAN AMERICAN): >60 ML/MIN/1.73 M^2
ESTIMATED AVG GLUCOSE: 226 MG/DL (ref 68–131)
GLUCOSE SERPL-MCNC: 81 MG/DL (ref 70–110)
GRAM STN SPEC: NORMAL
HBA1C MFR BLD: 9.5 % (ref 4–5.6)
HCT VFR BLD AUTO: 36 % (ref 40–54)
HGB BLD-MCNC: 10.8 G/DL (ref 14–18)
IMM GRANULOCYTES # BLD AUTO: 0.01 K/UL (ref 0–0.04)
IMM GRANULOCYTES NFR BLD AUTO: 0.2 % (ref 0–0.5)
LYMPHOCYTES # BLD AUTO: 1.1 K/UL (ref 1–4.8)
LYMPHOCYTES NFR BLD: 24.6 % (ref 18–48)
MCH RBC QN AUTO: 25.4 PG (ref 27–31)
MCHC RBC AUTO-ENTMCNC: 30 G/DL (ref 32–36)
MCV RBC AUTO: 85 FL (ref 82–98)
MONOCYTES # BLD AUTO: 0.5 K/UL (ref 0.3–1)
MONOCYTES NFR BLD: 10.5 % (ref 4–15)
NEUTROPHILS # BLD AUTO: 2.7 K/UL (ref 1.8–7.7)
NEUTROPHILS NFR BLD: 59 % (ref 38–73)
NRBC BLD-RTO: 0 /100 WBC
PLATELET # BLD AUTO: 320 K/UL (ref 150–450)
PMV BLD AUTO: 9.6 FL (ref 9.2–12.9)
POCT GLUCOSE: 103 MG/DL (ref 70–110)
POCT GLUCOSE: 161 MG/DL (ref 70–110)
POCT GLUCOSE: 165 MG/DL (ref 70–110)
POCT GLUCOSE: 222 MG/DL (ref 70–110)
POCT GLUCOSE: 90 MG/DL (ref 70–110)
POTASSIUM SERPL-SCNC: 3.7 MMOL/L (ref 3.5–5.1)
PROT SERPL-MCNC: 7.5 G/DL (ref 6–8.4)
RBC # BLD AUTO: 4.26 M/UL (ref 4.6–6.2)
SODIUM SERPL-SCNC: 138 MMOL/L (ref 136–145)
WBC # BLD AUTO: 4.56 K/UL (ref 3.9–12.7)

## 2022-06-16 PROCEDURE — 25000003 PHARM REV CODE 250: Performed by: HOSPITALIST

## 2022-06-16 PROCEDURE — 63600175 PHARM REV CODE 636 W HCPCS: Performed by: STUDENT IN AN ORGANIZED HEALTH CARE EDUCATION/TRAINING PROGRAM

## 2022-06-16 PROCEDURE — 87075 CULTR BACTERIA EXCEPT BLOOD: CPT | Performed by: STUDENT IN AN ORGANIZED HEALTH CARE EDUCATION/TRAINING PROGRAM

## 2022-06-16 PROCEDURE — 25000003 PHARM REV CODE 250: Performed by: STUDENT IN AN ORGANIZED HEALTH CARE EDUCATION/TRAINING PROGRAM

## 2022-06-16 PROCEDURE — 87077 CULTURE AEROBIC IDENTIFY: CPT | Performed by: STUDENT IN AN ORGANIZED HEALTH CARE EDUCATION/TRAINING PROGRAM

## 2022-06-16 PROCEDURE — 20220 BONE BIOPSY TROCAR/NDL SUPFC: CPT

## 2022-06-16 PROCEDURE — 20220: ICD-10-PCS | Mod: ,,, | Performed by: STUDENT IN AN ORGANIZED HEALTH CARE EDUCATION/TRAINING PROGRAM

## 2022-06-16 PROCEDURE — 63600175 PHARM REV CODE 636 W HCPCS: Performed by: HOSPITALIST

## 2022-06-16 PROCEDURE — 87205 SMEAR GRAM STAIN: CPT | Mod: 59 | Performed by: STUDENT IN AN ORGANIZED HEALTH CARE EDUCATION/TRAINING PROGRAM

## 2022-06-16 PROCEDURE — 99233 SBSQ HOSP IP/OBS HIGH 50: CPT | Mod: ,,, | Performed by: STUDENT IN AN ORGANIZED HEALTH CARE EDUCATION/TRAINING PROGRAM

## 2022-06-16 PROCEDURE — 93923 PR NON-INVASIVE PHYSIOLOGIC STUDY EXTREMITY 3 LEVELS: ICD-10-PCS | Mod: 26,,, | Performed by: SURGERY

## 2022-06-16 PROCEDURE — 20220 BONE BIOPSY TROCAR/NDL SUPFC: CPT | Mod: ,,, | Performed by: STUDENT IN AN ORGANIZED HEALTH CARE EDUCATION/TRAINING PROGRAM

## 2022-06-16 PROCEDURE — 25500020 PHARM REV CODE 255: Performed by: STUDENT IN AN ORGANIZED HEALTH CARE EDUCATION/TRAINING PROGRAM

## 2022-06-16 PROCEDURE — 99233 PR SUBSEQUENT HOSPITAL CARE,LEVL III: ICD-10-PCS | Mod: ,,, | Performed by: STUDENT IN AN ORGANIZED HEALTH CARE EDUCATION/TRAINING PROGRAM

## 2022-06-16 PROCEDURE — C9399 UNCLASSIFIED DRUGS OR BIOLOG: HCPCS | Performed by: STUDENT IN AN ORGANIZED HEALTH CARE EDUCATION/TRAINING PROGRAM

## 2022-06-16 PROCEDURE — 93923 UPR/LXTR ART STDY 3+ LVLS: CPT | Mod: 26,,, | Performed by: SURGERY

## 2022-06-16 PROCEDURE — 87147 CULTURE TYPE IMMUNOLOGIC: CPT | Performed by: STUDENT IN AN ORGANIZED HEALTH CARE EDUCATION/TRAINING PROGRAM

## 2022-06-16 PROCEDURE — 87186 SC STD MICRODIL/AGAR DIL: CPT | Mod: 59 | Performed by: STUDENT IN AN ORGANIZED HEALTH CARE EDUCATION/TRAINING PROGRAM

## 2022-06-16 PROCEDURE — 87070 CULTURE OTHR SPECIMN AEROBIC: CPT | Mod: 59 | Performed by: STUDENT IN AN ORGANIZED HEALTH CARE EDUCATION/TRAINING PROGRAM

## 2022-06-16 PROCEDURE — 87176 TISSUE HOMOGENIZATION CULTR: CPT | Performed by: STUDENT IN AN ORGANIZED HEALTH CARE EDUCATION/TRAINING PROGRAM

## 2022-06-16 PROCEDURE — 99223 PR INITIAL HOSPITAL CARE,LEVL III: ICD-10-PCS | Mod: ,,, | Performed by: SURGERY

## 2022-06-16 PROCEDURE — 83036 HEMOGLOBIN GLYCOSYLATED A1C: CPT | Performed by: STUDENT IN AN ORGANIZED HEALTH CARE EDUCATION/TRAINING PROGRAM

## 2022-06-16 PROCEDURE — 99223 1ST HOSP IP/OBS HIGH 75: CPT | Mod: ,,, | Performed by: SURGERY

## 2022-06-16 PROCEDURE — 80053 COMPREHEN METABOLIC PANEL: CPT | Performed by: STUDENT IN AN ORGANIZED HEALTH CARE EDUCATION/TRAINING PROGRAM

## 2022-06-16 PROCEDURE — 20600001 HC STEP DOWN PRIVATE ROOM

## 2022-06-16 PROCEDURE — 87076 CULTURE ANAEROBE IDENT EACH: CPT | Performed by: STUDENT IN AN ORGANIZED HEALTH CARE EDUCATION/TRAINING PROGRAM

## 2022-06-16 PROCEDURE — 85025 COMPLETE CBC W/AUTO DIFF WBC: CPT | Performed by: STUDENT IN AN ORGANIZED HEALTH CARE EDUCATION/TRAINING PROGRAM

## 2022-06-16 PROCEDURE — 36415 COLL VENOUS BLD VENIPUNCTURE: CPT | Performed by: STUDENT IN AN ORGANIZED HEALTH CARE EDUCATION/TRAINING PROGRAM

## 2022-06-16 RX ORDER — HYDRALAZINE HYDROCHLORIDE 20 MG/ML
10 INJECTION INTRAMUSCULAR; INTRAVENOUS ONCE
Status: COMPLETED | OUTPATIENT
Start: 2022-06-16 | End: 2022-06-16

## 2022-06-16 RX ORDER — HYDRALAZINE HYDROCHLORIDE 20 MG/ML
10 INJECTION INTRAMUSCULAR; INTRAVENOUS EVERY 6 HOURS PRN
Status: DISCONTINUED | OUTPATIENT
Start: 2022-06-16 | End: 2022-06-18

## 2022-06-16 RX ORDER — CEFEPIME HYDROCHLORIDE 1 G/50ML
2 INJECTION, SOLUTION INTRAVENOUS
Status: DISCONTINUED | OUTPATIENT
Start: 2022-06-16 | End: 2022-06-19

## 2022-06-16 RX ORDER — POTASSIUM CHLORIDE 20 MEQ/1
40 TABLET, EXTENDED RELEASE ORAL ONCE
Status: COMPLETED | OUTPATIENT
Start: 2022-06-16 | End: 2022-06-16

## 2022-06-16 RX ORDER — HYDRALAZINE HYDROCHLORIDE 25 MG/1
25 TABLET, FILM COATED ORAL EVERY 8 HOURS
Status: DISCONTINUED | OUTPATIENT
Start: 2022-06-16 | End: 2022-06-18

## 2022-06-16 RX ADMIN — INSULIN ASPART 2 UNITS: 100 INJECTION, SOLUTION INTRAVENOUS; SUBCUTANEOUS at 09:06

## 2022-06-16 RX ADMIN — OXYCODONE HYDROCHLORIDE AND ACETAMINOPHEN 1 TABLET: 5; 325 TABLET ORAL at 09:06

## 2022-06-16 RX ADMIN — VANCOMYCIN HYDROCHLORIDE 2000 MG: 10 INJECTION, POWDER, LYOPHILIZED, FOR SOLUTION INTRAVENOUS at 06:06

## 2022-06-16 RX ADMIN — ALUMINUM HYDROXIDE, MAGNESIUM HYDROXIDE, AND SIMETHICONE 30 ML: 200; 200; 20 SUSPENSION ORAL at 05:06

## 2022-06-16 RX ADMIN — HYDRALAZINE HYDROCHLORIDE 50 MG: 50 TABLET ORAL at 05:06

## 2022-06-16 RX ADMIN — HYDRALAZINE HYDROCHLORIDE 10 MG: 20 INJECTION, SOLUTION INTRAMUSCULAR; INTRAVENOUS at 12:06

## 2022-06-16 RX ADMIN — GABAPENTIN 200 MG: 100 CAPSULE ORAL at 09:06

## 2022-06-16 RX ADMIN — SUCRALFATE 1 G: 1 SUSPENSION ORAL at 05:06

## 2022-06-16 RX ADMIN — INSULIN DETEMIR 10 UNITS: 100 INJECTION, SOLUTION SUBCUTANEOUS at 09:06

## 2022-06-16 RX ADMIN — ATORVASTATIN CALCIUM 80 MG: 20 TABLET, FILM COATED ORAL at 08:06

## 2022-06-16 RX ADMIN — TAMSULOSIN HYDROCHLORIDE 0.8 MG: 0.4 CAPSULE ORAL at 08:06

## 2022-06-16 RX ADMIN — GABAPENTIN 200 MG: 100 CAPSULE ORAL at 08:06

## 2022-06-16 RX ADMIN — IOHEXOL 100 ML: 350 INJECTION, SOLUTION INTRAVENOUS at 02:06

## 2022-06-16 RX ADMIN — HYDRALAZINE HYDROCHLORIDE 25 MG: 25 TABLET, FILM COATED ORAL at 09:06

## 2022-06-16 RX ADMIN — CEFEPIME HYDROCHLORIDE 2 G: 2 INJECTION, SOLUTION INTRAVENOUS at 05:06

## 2022-06-16 RX ADMIN — HYDRALAZINE HYDROCHLORIDE 25 MG: 25 TABLET, FILM COATED ORAL at 01:06

## 2022-06-16 RX ADMIN — SUCRALFATE 1 G: 1 SUSPENSION ORAL at 06:06

## 2022-06-16 RX ADMIN — ALUMINUM HYDROXIDE, MAGNESIUM HYDROXIDE, AND SIMETHICONE 30 ML: 200; 200; 20 SUSPENSION ORAL at 09:06

## 2022-06-16 RX ADMIN — LOSARTAN POTASSIUM AND HYDROCHLOROTHIAZIDE 1 TABLET: 100; 25 TABLET, FILM COATED ORAL at 08:06

## 2022-06-16 RX ADMIN — POTASSIUM CHLORIDE 40 MEQ: 1500 TABLET, EXTENDED RELEASE ORAL at 03:06

## 2022-06-16 NOTE — PROGRESS NOTES
[facility] - Telemetry [facility] Medicine  Progress Note    Patient Name: Saji Castañeda  MRN: [id number]  Patient Class: IP- Inpatient   Admission Date: 6/15/2022  Length of Stay: 1 days  Attending Physician: Ortiz Bob*  Primary Care Provider: Serge Holland MD        Subjective:     Principal Problem:Osteomyelitis of left lower extremity        HPI:  71 y.o. M with PMHx DM2 and PVD with chronic diabetic foot wounds s/p L sided BKA, HTN, asymptomatic bradycardia, CKD 3 and HLD who presented to the ED for worsening R foot pain and drainage. Pt. With known chronic ulceration/osteomylitis to L heel and ID has recommended BKA for definitive therapy, but the patient reports that he has not been interested in amputation. Today, he had his usual f/u appointment with ID, and the patient was referred to the ED for imaging with bone biopsy and potential abx because of worsening drainage and pain. Pt. Denies any current fevers, chills, nausea, or other systemic signs of infection.      Overview/Hospital Course:  Patient admitted to hospital medicine. Podiatry and vascular surgery consulted.       Interval History: Patient hypertensive overnight and this morning. Patient feels well, has no complaints. Considering surgery.     Review of Systems  Objective:     Vital Signs (Most Recent):  Temp: 98.4 °F (36.9 °C) (06/16/22 1102)  Pulse: (!) 50 (06/16/22 1104)  Resp: 18 (06/16/22 1102)  BP: (!) 183/79 (06/16/22 1104)  SpO2: 95 % (06/16/22 1104)   Vital Signs (24h Range):  Temp:  [98.2 °F (36.8 °C)-99 °F (37.2 °C)] 98.4 °F (36.9 °C)  Pulse:  [40-68] 50  Resp:  [14-19] 18  SpO2:  [93 %-98 %] 95 %  BP: (135-229)/() 183/79     Weight: 124.3 kg (274 lb 0.5 oz)  Body mass index is 40.47 kg/m².    Intake/Output Summary (Last 24 hours) at 6/16/2022 1304  Last data filed at 6/16/2022 0548  Gross per 24 hour   Intake --   Output 700 ml   Net -700 ml      Physical Exam  Constitutional:       General: He is not  in acute distress.     Appearance: Normal appearance. He is not toxic-appearing or diaphoretic.   Cardiovascular:      Rate and Rhythm: Regular rhythm. Bradycardia present.   Pulmonary:      Effort: Pulmonary effort is normal. No respiratory distress.      Breath sounds: No wheezing.   Abdominal:      General: Abdomen is flat. There is no distension.      Palpations: Abdomen is soft.      Tenderness: There is no abdominal tenderness. There is no guarding or rebound.   Musculoskeletal:         General: Deformity present.      Cervical back: Normal range of motion and neck supple.   Skin:     General: Skin is warm and dry.   Neurological:      Mental Status: He is alert.       Computed MELD-Na score unavailable. Necessary lab results were not found in the last year.  Computed MELD score unavailable. Necessary lab results were not found in the last year.    Significant Labs:  CBC:  Recent Labs   Lab 06/15/22  1633 06/16/22  1100   WBC 6.67 4.56   HGB 10.7* 10.8*   HCT 35.0* 36.0*    320     CMP:  Recent Labs   Lab 06/15/22  1633 06/16/22  1100    138   K 3.9 3.7    104   CO2 25 24   GLU 68* 81   BUN 10 9   CREATININE 1.1 0.9   CALCIUM 9.2 9.1   PROT 8.0 7.5   ALBUMIN 2.8* 2.6*   BILITOT 0.4 0.5   ALKPHOS 87 84   AST 12 11   ALT 11 7*   ANIONGAP 10 10   EGFRNONAA >60.0 >60.0     PTINR:  No results for input(s): INR in the last 48 hours.    Significant Procedures:   Dobutamine Stress Test with Color Flow: No results found for this or any previous visit.      Assessment/Plan:      * Osteomyelitis of left lower extremity  -Pt. With acute on chronic osteomyleitis of LLE including heel with purulent foul smelling drainage  -Podiatry consulted, pt. Not spetic appearing. Per ED, podiatry requested hold abx until after bone biopsy  -Pt. NPO at midnight for biopsy, plan to start broad spectrum abx after procedure with ID consult once culture data results for treatment plan      Peripheral arterial  disease  -PVD contributing to current non-healing foot wound. Arterial U/S shows LLE atherosclerotic disease Areas of high-grade stenosis within the L popliteal artery  -Continue home statin, ASA  -Vascular surgery consulted for recommendations regarding potential revascularization although BKA may be the only means of definitive treatment for his foot infection    Asymptomatic Mobitz (type) I (Wenckebach's) atrioventricular block  -Bradycardic in ED but asymptomatic, chronic issue      Chronic kidney disease, stage III (moderate)  -Cr at baseline 1.1, no acute issues  -Continue to monitor    Type 2 diabetes, uncontrolled, with neuropathy  -Last A1c 2020, repeat ordered. Per pt., taking 68 units lantus QHS along with metformin  mg and glipizide 10 mg daily  -Mildly hypoglycemic on admit to 63, moderate dose SSI ordered   -Pt. To be NPO, will recheck BG and give decreased dose of 15 units levemir tonight if pt. Becomes hyperglycemic. Titrate up as needed    Essential hypertension  -Med rec shows pt. Taking losartan/HCTZ and has been dispensed both the combination of this med as well as the separate pills recently. He is uncertain of what he takes, remarking he only takes one small pill in the am for BP  -Markedly HTN in ED, started losartan 100mg and HCTZ 25 mg with hydralazine 25 mg PO q8h      VTE Risk Mitigation (From admission, onward)         Ordered     IP VTE HIGH RISK PATIENT  Once         06/15/22 1957     Place sequential compression device  Until discontinued         06/15/22 1957                Discharge Planning   OXANA:      Code Status: Full Code   Is the patient medically ready for discharge?:     Reason for patient still in hospital (select all that apply): Patient trending condition, Treatment and Consult recommendations             Ortiz Benitez MD  Department of Hospital Medicine   [facility] - Telemetry Stepdown

## 2022-06-16 NOTE — ASSESSMENT & PLAN NOTE
-PVD contributing to current non-healing foot wound. Arterial U/S shows LLE atherosclerotic disease Areas of high-grade stenosis within the L popliteal artery  -Continue home statin, ASA  -Vascular surgery consulted for recommendations regarding poentrial revascularization although BKA may be the only means of definitive treatment for his foot infection

## 2022-06-16 NOTE — NURSING
Pt hypertensive this AM. PRN hydralazine given. Pt asymptomatic at this time. Paged Med A twice, awaiting reply.

## 2022-06-16 NOTE — PLAN OF CARE
Problem: Adult Inpatient Plan of Care  Goal: Plan of Care Review  Outcome: Ongoing, Progressing  Goal: Patient-Specific Goal (Individualized)  Outcome: Ongoing, Progressing  Goal: Optimal Comfort and Wellbeing  Outcome: Ongoing, Progressing     Problem: Diabetes Comorbidity  Goal: Blood Glucose Level Within Targeted Range  Outcome: Ongoing, Progressing  Intervention: Monitor and Manage Glycemia  Flowsheets (Taken 6/16/2022 2973)  Glycemic Management: blood glucose monitored     POC reviewed. Address questions and concerns. AAOX4. Hypertensive. Asymptomatic. Remain IV Abx. Pain manage,prn. Wound care perform by MD-ace wrap to left lower leg. Frequent safety checks. Call light and urinal in reach. TM

## 2022-06-16 NOTE — CONSULTS
[facility] - Telemetry Stepdown  Podiatry  Consult Note    Patient Name: Sjai Castañeda  MRN: [id number]  Admission Date: 6/15/2022  Hospital Length of Stay: 1 days  Attending Physician: Ortiz Bob*  Primary Care Provider: Serge Holland MD     Inpatient consult to Podiatry  Consult performed by: Ortiz John MD  Consult ordered by: Aniya Melton MD        Subjective:     History of Present Illness:  70 yo M with Hx of DM, PAD, CKD, L Chopart amputation admitted 6/15 for L foot infection. Has chronic nonhealing left heel wound with recurrent infections, has been offered/recommended major amputation in the past by multiple services but has declined. Minimal ambulation at baseline, only stands on LLE for transfers. Uses wheelchair for locomotion. Sent to the ED by ID for worsening drainage and pain at the wound. Denies fever, chills, nausea, vomiting.       Scheduled Meds:   aluminum-magnesium hydroxide-simethicone  30 mL Oral QID (AC & HS)    [START ON 6/17/2022] aspirin  81 mg Oral Daily    atorvastatin  80 mg Oral Daily    gabapentin  200 mg Oral BID    hydrALAZINE  25 mg Oral Q8H    insulin detemir U-100  10 Units Subcutaneous QHS    losartan-hydrochlorothiazide 100-25 mg  1 tablet Oral Daily    potassium chloride  40 mEq Oral Once    sucralfate  1 g Oral Q6H    tamsulosin  0.8 mg Oral Daily     Continuous Infusions:  PRN Meds:acetaminophen, albuterol-ipratropium, dextrose 10%, dextrose 10%, glucagon (human recombinant), glucose, glucose, hydrALAZINE, insulin aspart U-100, melatonin, naloxone, ondansetron, oxyCODONE-acetaminophen, prochlorperazine, sodium chloride 0.9%    Review of patient's allergies indicates:  No Known Allergies     Past Medical History:   Diagnosis Date    Arthritis     legs    Diabetes mellitus     Diabetes mellitus, type 2     Hyperlipidemia     Osteomyelitis      Past Surgical History:   Procedure Laterality Date    ANGIOGRAPHY OF LOWER EXTREMITY N/A  2/3/2021    Procedure: Angiogram Extremity Bilateral;  Surgeon: Ernst Chacko MD;  Location: [facility] OR 2ND FLR;  Service: Peripheral Vascular;  Laterality: N/A;  7.4 mintues fluoroscopy time  816.15 mGy  170.17 Gycm2    AORTOGRAPHY WITH EXTREMITY RUNOFF Bilateral 2/3/2021    Procedure: AORTOGRAM, WITH EXTREMITY RUNOFF;  Surgeon: Ernst Chacko MD;  Location: [facility] OR 2ND FLR;  Service: Peripheral Vascular;  Laterality: Bilateral;    DEBRIDEMENT OF FOOT Left 2/23/2021    Procedure: DEBRIDEMENT, LEFT HEEL;  Surgeon: Mayra Schroeder DPM;  Location: [facility] OR 1ST FLR;  Service: Podiatry;  Laterality: Left;    FOOT AMPUTATION  October 2010    left high midfoot amputation       Family History       Problem Relation (Age of Onset)    Cancer Brother    Diabetes Mother, Sister    Heart disease Mother    Stroke Sister          Tobacco Use    Smoking status: Never Smoker    Smokeless tobacco: Never Used   Substance and Sexual Activity    Alcohol use: No     Comment: occassional    Drug use: No    Sexual activity: Not Currently     Review of Systems   Constitutional:  Negative for activity change, chills, fever and unexpected weight change.   HENT:  Negative for congestion and sore throat.    Respiratory:  Negative for cough, shortness of breath and wheezing.    Cardiovascular:  Negative for chest pain, palpitations and leg swelling.   Gastrointestinal:  Negative for abdominal pain, blood in stool, nausea and vomiting.   Genitourinary:  Negative for dysuria and hematuria.   Musculoskeletal:  Positive for gait problem. Negative for arthralgias and neck pain.   Skin:  Positive for wound. Negative for color change and rash.   Neurological:  Positive for numbness. Negative for dizziness and seizures.   Psychiatric/Behavioral:  Negative for hallucinations and suicidal ideas.    All other systems reviewed and are negative.  Objective:     Vital Signs (Most Recent):  Temp: 98.4 °F (36.9 °C) (06/16/22  1102)  Pulse: (!) 50 (06/16/22 1104)  Resp: 18 (06/16/22 1102)  BP: (!) 183/79 (06/16/22 1104)  SpO2: 95 % (06/16/22 1237)   Vital Signs (24h Range):  Temp:  [98.2 °F (36.8 °C)-98.6 °F (37 °C)] 98.4 °F (36.9 °C)  Pulse:  [40-68] 50  Resp:  [14-19] 18  SpO2:  [93 %-98 %] 95 %  BP: (176-229)/() 183/79     Weight: 124.3 kg (274 lb 0.5 oz)  Body mass index is 40.47 kg/m².    Foot Exam    General  Orientation: alert and oriented to person, place, and time   Affect: appropriate       Right Foot/Ankle     Inspection and Palpation  Ecchymosis: none  Tenderness: none   Swelling: (BLE edema)  Skin Exam: skin intact;     Neurovascular  Dorsalis pedis: doppler  Posterior tibial: doppler  Saphenous nerve sensation: diminished  Tibial nerve sensation: diminished  Superficial peroneal nerve sensation: diminished  Deep peroneal nerve sensation: diminished  Sural nerve sensation: diminished      Left Foot/Ankle      Inspection and Palpation  Ecchymosis: none  Tenderness: (Tenderness to the posterior heel wound)  Swelling: (significant b/l LE edema)  Skin Exam: drainage, skin changes and ulcer;     Neurovascular  Dorsalis pedis: doppler  Posterior tibial: doppler  Saphenous nerve sensation: diminished  Tibial nerve sensation: diminished  Superficial peroneal nerve sensation: diminished  Deep peroneal nerve sensation: diminished  Sural nerve sensation: diminished    Comments  Chopart amputation    Laboratory:  Blood Cultures:   Recent Labs   Lab 06/15/22  1633 06/15/22  1634   LABBLOO No Growth to date No Growth to date     CBC:   Recent Labs   Lab 06/16/22  1100   WBC 4.56   RBC 4.26*   HGB 10.8*   HCT 36.0*      MCV 85   MCH 25.4*   MCHC 30.0*     CMP:   Recent Labs   Lab 06/16/22  1100   GLU 81   CALCIUM 9.1   ALBUMIN 2.6*   PROT 7.5      K 3.7   CO2 24      BUN 9   CREATININE 0.9   ALKPHOS 84   ALT 7*   AST 11   BILITOT 0.5     CRP:   Recent Labs   Lab 06/15/22  1633   CRP 26.7*     ESR:   Recent Labs    Lab 06/15/22  1633   SEDRATE >120*     Microbiology Results (last 7 days)       Procedure Component Value Units Date/Time    Gram stain [[id number]] Collected: 06/16/22 1305    Order Status: Sent Specimen: Wound from Foot, Left Updated: 06/16/22 1357    Culture, Anaerobe [[id number]] Collected: 06/16/22 1305    Order Status: Sent Specimen: Wound from Foot, Left Updated: 06/16/22 1355    Aerobic culture [[id number]] Collected: 06/16/22 1305    Order Status: Sent Specimen: Wound from Foot, Left Updated: 06/16/22 1351    Aerobic culture [[id number]] Collected: 06/16/22 1308    Order Status: Sent Specimen: Bone from Foot, Left Updated: 06/16/22 1330    Gram stain [[id number]] Collected: 06/16/22 1308    Order Status: Sent Specimen: Bone from Foot, Left Updated: 06/16/22 1330    Culture, Anaerobe [[id number]] Collected: 06/16/22 1308    Order Status: Sent Specimen: Bone from Foot, Left Updated: 06/16/22 1327    Blood culture x two cultures. Draw prior to antibiotics. [[id number]] Collected: 06/15/22 1633    Order Status: Completed Specimen: Blood from Peripheral, Antecubital, Right Updated: 06/16/22 0115     Blood Culture, Routine No Growth to date    Narrative:      Aerobic and anaerobic    Blood culture x two cultures. Draw prior to antibiotics. [[id number]] Collected: 06/15/22 1634    Order Status: Completed Specimen: Blood from Peripheral, Antecubital, Left Updated: 06/16/22 0115     Blood Culture, Routine No Growth to date    Narrative:      Aerobic and anaerobic    Urine culture [[id number]] Collected: 06/15/22 1817    Order Status: No result Specimen: Urine Updated: 06/15/22 1919          Specimen (24h ago, onward)                 Start     Ordered    06/16/22 1238  Specimen to Pathology, Surgery Orthopedics  Once        Comments: Pre-op Diagnosis: osteomyelitis Number of specimens: 1Name of specimens: left calcaneus     References:    Click here for ordering Quick Tip   Question Answer Comment   Procedure Type:  Orthopedics    Which provider would you like to cc? JG MEJIA    Release to patient Immediate        06/16/22 7899                      Clinical Findings:  -Left posterior heel wound down to subcutaneous tissue with thin soft tissue covering overlying calcaneus. Wound bed viable. Serous drainage. Mild tenderness. Periwound maceration, improving.             Assessment/Plan:     * Osteomyelitis of left lower extremity  Assessment: IDSA moderate to severe diabetic foot infection with left calcaneal osteomyelitis and overlying wound, possible significant PAD. Wound and bone cultures pending. L arterial US + for significant stenoses, L MARIO 1.43. CTA pending.     Plan:  -Left heel wound and bone cultured today.  -ID consulted. Antibiotic plan per ID.  -Appreciate vascular surgery recs. Workup ongoing.   -WB to E for transfers only.   -Dressing changes by nursing, ordered.  -Podiatry will follow.     Foot pain, left  See rest of plan    Peripheral arterial disease  Per vascular surgery        Thank you for your consult. I will follow-up with patient. Please contact us if you have any additional questions.      Jg Mejia DPM PGY-2  Podiatric Medicine & Surgery  [facility]  Secure Chat Preferred  Mobile: [phone]  Pager: [phone]

## 2022-06-16 NOTE — HOSPITAL COURSE
Patient admitted to hospital medicine. Podiatry and vascular surgery consulted. Patient underwent bone biopsy and culture. Infectious disease consulted.

## 2022-06-16 NOTE — HPI
72 yo M with Hx of DM, PAD, CKD, L Chopart amputation admitted 6/15 for L foot infection. Has chronic nonhealing left heel wound with recurrent infections, has been offered/recommended major amputation in the past by multiple services but has declined. Minimal ambulation at baseline, only stands on LLE for transfers. Uses wheelchair for locomotion. Sent to the ED by ID for worsening drainage and pain at the wound. Denies fever, chills, nausea, vomiting.

## 2022-06-16 NOTE — SUBJECTIVE & OBJECTIVE
"Medications Prior to Admission   Medication Sig Dispense Refill Last Dose    acetaminophen (TYLENOL) 325 MG tablet Take 2 tablets (650 mg total) by mouth every 6 (six) hours as needed for Pain.  0     aspirin (ECOTRIN) 81 MG EC tablet Take 81 mg by mouth once daily.       BD ULTRA-FINE ADITYA PEN NEEDLE 32 gauge x 5/32" Ndle USE FOUR TIMES DAILY WITH MEALS AND NIGHTLY 100 each 0     blood sugar diagnostic (CONTOUR TEST STRIPS) Strp Inject 1 strip into the skin 2 (two) times daily before meals. 100 strip 6     ELIQUIS 5 mg Tab        gabapentin (NEURONTIN) 100 MG capsule Take 2 capsules (200 mg total) by mouth 2 (two) times daily. 120 capsule 1     glipiZIDE (GLUCOTROL) 10 MG tablet Take 10 mg by mouth daily with breakfast. HOLD THIS MEDICATION IF YOU ARE SKIPPING A MEAL       hydroCHLOROthiazide (HYDRODIURIL) 25 MG tablet        insulin aspart U-100 (NOVOLOG) 100 unit/mL (3 mL) InPn pen Inject 0-5 Units into the skin before meals and at bedtime as needed (Hyperglycemia).  0     insulin detemir U-100 (LEVEMIR FLEXTOUCH) 100 unit/mL (3 mL) SubQ InPn pen Inject 55 Units into the skin every evening.  0     isosorbide-hydrALAZINE 20-37.5 mg (BIDIL) 20-37.5 mg Tab Take 1 tablet by mouth 3 (three) times daily. 90 tablet 2     ketoconazole (NIZORAL) 2 % cream Apply topically once daily. 60 g 1     LANTUS SOLOSTAR U-100 INSULIN glargine 100 units/mL (3mL) SubQ pen 68 Units every evening.       losartan (COZAAR) 100 MG tablet        losartan-hydrochlorothiazide 100-25 mg (HYZAAR) 100-25 mg per tablet Take 1 tablet by mouth once daily.       melatonin (MELATIN) 3 mg tablet Take 2 tablets (6 mg total) by mouth nightly as needed for Insomnia.  0     metFORMIN (GLUCOPHAGE-XR) 500 MG ER 24hr tablet        MICROLET LANCET Misc   0     multivitamin Tab Take 1 tablet by mouth once daily.       multivitamin with minerals tablet 1 tablet 2 TIMES DAILY (route: oral)       pantoprazole (PROTONIX) 40 MG tablet Take 40 mg by mouth once " daily.       polyethylene glycol (GLYCOLAX) 17 gram PwPk Take 17 g by mouth once daily.  0     rosuvastatin (CRESTOR) 40 MG Tab Take 40 mg by mouth once daily.       senna (SENOKOT) 8.6 mg tablet Take 1 tablet by mouth 2 (two) times daily.       tamsulosin (FLOMAX) 0.4 mg Cap Take 2 capsules (0.8 mg total) by mouth once daily. 90 capsule 3     triamcinolone acetonide 0.1% (KENALOG) 0.1 % cream Apply topically 2 (two) times daily. Apply to affected area twice daily as directed. 453.6 g 0     VITAMIN C WITH CARLOS HIPS 500 MG tablet Take 500 mg by mouth 2 (two) times daily.       VITAMIN D2 1,250 mcg (50,000 unit) capsule Take 50,000 Units by mouth every 7 days.       zinc sulfate (ZINCATE) 220 (50) mg capsule Take 1 capsule (220 mg total) by mouth once daily.          Review of patient's allergies indicates:  No Known Allergies    Past Medical History:   Diagnosis Date    Arthritis     legs    Diabetes mellitus     Diabetes mellitus, type 2     Hyperlipidemia     Osteomyelitis      Past Surgical History:   Procedure Laterality Date    ANGIOGRAPHY OF LOWER EXTREMITY N/A 2/3/2021    Procedure: Angiogram Extremity Bilateral;  Surgeon: Ernst Chacko MD;  Location: [facility] OR [address] [place] [address];  Service: Peripheral Vascular;  Laterality: N/A;  7.4 mintues fluoroscopy time  816.15 mGy  170.17 Gycm2    AORTOGRAPHY WITH EXTREMITY RUNOFF Bilateral 2/3/2021    Procedure: AORTOGRAM, WITH EXTREMITY RUNOFF;  Surgeon: Ernst Chacko MD;  Location: [facility] OR [address] [place] [address];  Service: Peripheral Vascular;  Laterality: Bilateral;    DEBRIDEMENT OF FOOT Left 2/23/2021    Procedure: DEBRIDEMENT, LEFT HEEL;  Surgeon: Mayra Schroeder DPM;  Location: [facility] OR [address] [place] [address];  Service: Podiatry;  Laterality: Left;    FOOT AMPUTATION  October 2010    left high midfoot amputation     Family History       Problem Relation (Age of Onset)    Cancer Brother    Diabetes Mother, Sister    Heart disease Mother    Stroke Sister          Tobacco Use    Smoking  status: Never Smoker    Smokeless tobacco: Never Used   Substance and Sexual Activity    Alcohol use: No     Comment: occassional    Drug use: No    Sexual activity: Not Currently     Review of Systems   All other systems reviewed and are negative.  Objective:     Vital Signs (Most Recent):  Temp: 98.2 °F (36.8 °C) (06/16/22 0341)  Pulse: 64 (06/16/22 0739)  Resp: 14 (06/16/22 0341)  BP: (!) 221/90 (06/16/22 0807)  SpO2: 98 % (06/16/22 0341)   Vital Signs (24h Range):  Temp:  [98.2 °F (36.8 °C)-99 °F (37.2 °C)] 98.2 °F (36.8 °C)  Pulse:  [40-68] 64  Resp:  [14-19] 14  SpO2:  [95 %-98 %] 98 %  BP: (135-229)/() 221/90     Weight: 124.3 kg (274 lb 0.5 oz)  Body mass index is 40.47 kg/m².    Physical Exam  Vitals reviewed.   Constitutional:       General: He is not in acute distress.     Appearance: He is well-developed.   HENT:      Head: Normocephalic and atraumatic.   Eyes:      Extraocular Movements: Extraocular movements intact.      Pupils: Pupils are equal, round, and reactive to light.   Neck:      Vascular: No JVD.      Trachea: No tracheal deviation.   Cardiovascular:      Rate and Rhythm: Regular rhythm. Bradycardia present.      Pulses:           Dorsalis pedis pulses are 0 on the right side and 0 on the left side.      Heart sounds: No murmur heard.    No friction rub. No gallop.   Pulmonary:      Effort: No respiratory distress.      Breath sounds: Normal breath sounds. No wheezing or rales.   Abdominal:      General: Bowel sounds are normal. There is no distension.      Palpations: Abdomen is soft. There is no mass.      Tenderness: There is no abdominal tenderness.   Musculoskeletal:         General: No deformity.      Cervical back: Neck supple.      Comments: LLE BKA, RLE with extensive foot swelling and open wound to heal with purulent drainage   Lymphadenopathy:      Cervical: No cervical adenopathy.   Skin:     General: Skin is warm and dry.      Findings: No rash.   Neurological:      Mental  Status: He is alert and oriented to person, place, and time.           Significant Labs:  All pertinent labs from the last 24 hours have been reviewed.    Significant Diagnostics:  I have reviewed all pertinent imaging results/findings within the past 24 hours.

## 2022-06-16 NOTE — NURSING
Patient to vascular ultrasound via stretcher with telemetry monitor. AAOX4. Pain medication administered.

## 2022-06-16 NOTE — ASSESSMENT & PLAN NOTE
Saji Castañeda is a 70 y.o. male who has a past medical history of Arthritis, Diabetes mellitus, Diabetes mellitus, type 2, Hyperlipidemia, and Osteomyelitis, chronic lymphedema with chronic bilateral lower extremity wounds who presents with worsening and nonhealing left lower extremity wounds:     -- Angiogram from 2/2021: Tortuous but non-diseased aortoiliac system, Left Femoral popliteal segments without disease, Left tibial vessels without disease, 3 vessel runoff to foot, R femoral popliteal segments without disease  , and R 2 vessel runoff to foot.  -- S/p bone biopsy with podiatry   -- Patient will eventually need a BKA vs AKA based on imaging and physical exam   -- He will make a decision in the following days

## 2022-06-16 NOTE — ASSESSMENT & PLAN NOTE
-Pt. With acute on chronic osteomyleitis of LLE including heel with purulent foul smelling drainage  -Podiatry consulted, pt. Not spetic appearing. Per ED, podiatry requested hold abx until after bone biopsy  -Pt. NPO at midnight for biopsy, plan to start broad spectrum abx after procedure with ID consult once culture data results for treatment plan

## 2022-06-16 NOTE — ASSESSMENT & PLAN NOTE
-Last A1c 2020, repeat ordered. Per pt., taking 68 units lantus QHS along with metformin  mg and glipizide 10 mg daily  -Mildly hypoglycemic on admit to 63, moderate dose SSI ordered   -Pt. To be NPO, will recheck BG and give decreased dose of 15 units levemir tonight if pt. Becomes hyperglycemic. Titrate up as needed

## 2022-06-16 NOTE — CARE UPDATE
RAPID RESPONSE NOTE     Called by telemetry tech with concern for bradycardia (HR 38). Per MD notes, pt with known asymptomatic Mobitz type I AV block. Vital signs otherwise stable.     Please call Rapid Response RN, Josefa Mcnamara RN with any questions or concerns at [phone].

## 2022-06-16 NOTE — H&P
[facility] - Emergency Dept  [facility]  History & Physical    Patient Name: Saji Castañeda  MRN: [id number]  Patient Class: IP- Inpatient  Admission Date: 6/15/2022  Attending Physician: Milton Shelton MD   Primary Care Provider: Serge Holland MD         Patient information was obtained from patient, past medical records and ER records.     Subjective:     Principal Problem:Osteomyelitis of left lower extremity    Chief Complaint:   Chief Complaint   Patient presents with    Wound Check     Sent from podiatry and stated in pain. L toes amputated        HPI: 71 y.o. M with PMHx DM2 and PVD with chronic diabetic foot wounds s/p L sided BKA, HTN, asymptomatic bradycardia, CKD 3 and HLD who presented to the ED for worsening R foot pain and drainage. Pt. With known chronic ulceration/osteomylitis to L heel and ID has recommended BKA for definitive therapy, but the patient reports that he has not been interested in amputation. Today, he had his usual f/u appointment with ID, and the patient was referred to the ED for imaging with bone biopsy and potential abx because of worsening drainage and pain. Pt. Denies any current fevers, chills, nausea, or other systemic signs of infection.      Past Medical History:   Diagnosis Date    Arthritis     legs    Diabetes mellitus     Diabetes mellitus, type 2     Hyperlipidemia     Osteomyelitis        Past Surgical History:   Procedure Laterality Date    ANGIOGRAPHY OF LOWER EXTREMITY N/A 2/3/2021    Procedure: Angiogram Extremity Bilateral;  Surgeon: Ernst Chacko MD;  Location: [address];  Service: Peripheral Vascular;  Laterality: N/A;  7.4 mintues fluoroscopy time  816.15 mGy  170.17 Gycm2    AORTOGRAPHY WITH EXTREMITY RUNOFF Bilateral 2/3/2021    Procedure: AORTOGRAM, WITH EXTREMITY RUNOFF;  Surgeon: Ernst Chacko MD;  Location: [facility] OR [address] [place] [address];  Service: Peripheral Vascular;  Laterality: Bilateral;    DEBRIDEMENT OF FOOT Left 2/23/2021  "   Procedure: DEBRIDEMENT, LEFT HEEL;  Surgeon: Mayra Schroeder DPM;  Location: [facility] OR [address] [place] [address];  Service: Podiatry;  Laterality: Left;    FOOT AMPUTATION  October 2010    left high midfoot amputation       Review of patient's allergies indicates:  No Known Allergies    No current facility-administered medications on file prior to encounter.     Current Outpatient Medications on File Prior to Encounter   Medication Sig    acetaminophen (TYLENOL) 325 MG tablet Take 2 tablets (650 mg total) by mouth every 6 (six) hours as needed for Pain.    aspirin (ECOTRIN) 81 MG EC tablet Take 81 mg by mouth once daily.    BD ULTRA-FINE ADITYA PEN NEEDLE 32 gauge x 5/32" Ndle USE FOUR TIMES DAILY WITH MEALS AND NIGHTLY    blood sugar diagnostic (CONTOUR TEST STRIPS) Strp Inject 1 strip into the skin 2 (two) times daily before meals.    ELIQUIS 5 mg Tab     gabapentin (NEURONTIN) 100 MG capsule Take 2 capsules (200 mg total) by mouth 2 (two) times daily.    glipiZIDE (GLUCOTROL) 10 MG tablet Take 10 mg by mouth daily with breakfast. HOLD THIS MEDICATION IF YOU ARE SKIPPING A MEAL    hydroCHLOROthiazide (HYDRODIURIL) 25 MG tablet     insulin aspart U-100 (NOVOLOG) 100 unit/mL (3 mL) InPn pen Inject 0-5 Units into the skin before meals and at bedtime as needed (Hyperglycemia).    insulin detemir U-100 (LEVEMIR FLEXTOUCH) 100 unit/mL (3 mL) SubQ InPn pen Inject 55 Units into the skin every evening.    isosorbide-hydrALAZINE 20-37.5 mg (BIDIL) 20-37.5 mg Tab Take 1 tablet by mouth 3 (three) times daily.    ketoconazole (NIZORAL) 2 % cream Apply topically once daily.    LANTUS SOLOSTAR U-100 INSULIN glargine 100 units/mL (3mL) SubQ pen 68 Units every evening.    losartan (COZAAR) 100 MG tablet     losartan-hydrochlorothiazide 100-25 mg (HYZAAR) 100-25 mg per tablet Take 1 tablet by mouth once daily.    melatonin (MELATIN) 3 mg tablet Take 2 tablets (6 mg total) by mouth nightly as needed for Insomnia.    metFORMIN " (GLUCOPHAGE-XR) 500 MG ER 24hr tablet     MICROLET LANCET Misc     multivitamin Tab Take 1 tablet by mouth once daily.    multivitamin with minerals tablet 1 tablet 2 TIMES DAILY (route: oral)    pantoprazole (PROTONIX) 40 MG tablet Take 40 mg by mouth once daily.    polyethylene glycol (GLYCOLAX) 17 gram PwPk Take 17 g by mouth once daily.    rosuvastatin (CRESTOR) 40 MG Tab Take 40 mg by mouth once daily.    senna (SENOKOT) 8.6 mg tablet Take 1 tablet by mouth 2 (two) times daily.    tamsulosin (FLOMAX) 0.4 mg Cap Take 2 capsules (0.8 mg total) by mouth once daily.    triamcinolone acetonide 0.1% (KENALOG) 0.1 % cream Apply topically 2 (two) times daily. Apply to affected area twice daily as directed.    VITAMIN C WITH CARLOS HIPS 500 MG tablet Take 500 mg by mouth 2 (two) times daily.    VITAMIN D2 1,250 mcg (50,000 unit) capsule Take 50,000 Units by mouth every 7 days.    zinc sulfate (ZINCATE) 220 (50) mg capsule Take 1 capsule (220 mg total) by mouth once daily.    [DISCONTINUED] ciprofloxacin HCl (CIPRO) 500 MG tablet Take 1 tablet by mouth 2 (two) times daily.    [DISCONTINUED] furosemide (LASIX) 40 MG tablet Take 40 mg by mouth every other day.    [DISCONTINUED] metroNIDAZOLE (FLAGYL) 500 MG tablet Take 1 tablet (500 mg total) by mouth every 8 (eight) hours.     Family History       Problem Relation (Age of Onset)    Cancer Brother    Diabetes Mother, Sister    Heart disease Mother    Stroke Sister          Tobacco Use    Smoking status: Never Smoker    Smokeless tobacco: Never Used   Substance and Sexual Activity    Alcohol use: No     Comment: occassional    Drug use: No    Sexual activity: Not Currently     Review of Systems   Constitutional:  Negative for activity change, chills, fever and unexpected weight change.   HENT:  Negative for congestion and sore throat.    Respiratory:  Negative for cough, shortness of breath and wheezing.    Cardiovascular:  Negative for chest pain,  palpitations and leg swelling.   Gastrointestinal:  Negative for abdominal pain, blood in stool, nausea and vomiting.   Genitourinary:  Negative for dysuria and hematuria.   Musculoskeletal:  Negative for arthralgias and neck pain.   Skin:  Positive for wound. Negative for color change and rash.        Owrsening L foot wound with drainage   Neurological:  Negative for dizziness, seizures and numbness.   Psychiatric/Behavioral:  Negative for hallucinations and suicidal ideas.    Objective:     Vital Signs (Most Recent):  Temp: 99 °F (37.2 °C) (06/15/22 1347)  Pulse: (!) 40 (06/15/22 2103)  Resp: 15 (06/15/22 2103)  BP: (!) 182/77 (06/15/22 2103)  SpO2: 95 % (06/15/22 2103)   Vital Signs (24h Range):  Temp:  [99 °F (37.2 °C)] 99 °F (37.2 °C)  Pulse:  [40-55] 40  Resp:  [15-18] 15  SpO2:  [95 %-96 %] 95 %  BP: (135-229)/(52-89) 182/77     Weight: 124.3 kg (274 lb 0.5 oz)  Body mass index is 40.47 kg/m².    Physical Exam  Vitals reviewed.   Constitutional:       General: He is not in acute distress.     Appearance: He is well-developed.   HENT:      Head: Normocephalic and atraumatic.   Eyes:      Extraocular Movements: Extraocular movements intact.      Pupils: Pupils are equal, round, and reactive to light.   Neck:      Vascular: No JVD.      Trachea: No tracheal deviation.   Cardiovascular:      Rate and Rhythm: Regular rhythm. Bradycardia present.      Pulses:           Dorsalis pedis pulses are 0 on the right side and 0 on the left side.      Heart sounds: No murmur heard.    No friction rub. No gallop.   Pulmonary:      Effort: No respiratory distress.      Breath sounds: Normal breath sounds. No wheezing or rales.   Abdominal:      General: Bowel sounds are normal. There is no distension.      Palpations: Abdomen is soft. There is no mass.      Tenderness: There is no abdominal tenderness.   Musculoskeletal:         General: No deformity.      Cervical back: Neck supple.      Comments: LLE BKA, RLE with extensive  foot swelling and open wound to heal with purulent drainage   Lymphadenopathy:      Cervical: No cervical adenopathy.   Skin:     General: Skin is warm and dry.      Findings: No rash.   Neurological:      Mental Status: He is alert and oriented to person, place, and time.         CRANIAL NERVES     CN III, IV, VI   Pupils are equal, round, and reactive to light.     Significant Labs: All pertinent labs within the past 24 hours have been reviewed.    Significant Imaging: I have reviewed all pertinent imaging results/findings within the past 24 hours.    Assessment/Plan:     * Osteomyelitis of left lower extremity  -Pt. With acute on chronic osteomyleitis of LLE including heel with purulent foul smelling drainage  -Podiatry consulted, pt. Not spetic appearing. Per ED, podiatry requested hold abx until after bone biopsy  -Pt. NPO at midnight for biopsy, plan to start broad spectrum abx after procedure with ID consult once culture data results for treatment plan      Peripheral arterial disease  -PVD contributing to current non-healing foot wound. Arterial U/S shows LLE atherosclerotic disease Areas of high-grade stenosis within the L popliteal artery  -Continue home statin, ASA  -Vascular surgery consulted for recommendations regarding poentrial revascularization although BKA may be the only means of definitive treatment for his foot infection    Asymptomatic Mobitz (type) I (Wenckebach's) atrioventricular block  -Bradycardic in ED but asymptomatic, chronic issue      Chronic kidney disease, stage III (moderate)  -Cr at baseline 1.1, no acute issues  -Continue to monitor    Type 2 diabetes, uncontrolled, with neuropathy  -Last A1c 2020, repeat ordered. Per pt., taking 68 units lantus QHS along with metformin  mg and glipizide 10 mg daily  -Mildly hypoglycemic on admit to 63, moderate dose SSI ordered with diabetic diet  -Pt. To be NPO, will recheck BG and give decreased dose of 20 units levemir tonight if pt.  Becomes hyperglycemic. Titrate up as needed      Essential hypertension  -Med rec shows pt. Taking losartan/HCTZ and has been dispensed both the combination of this med as well as the separate pills recently. He is uncertain of what he takes, remarking he only takes one small pill in the am for BP  -Markedly HTN in ED, will start losartan 100mg and HCTZ 25 mg with hydralazine 50 mg PO PRN for BP >170        VTE Risk Mitigation (From admission, onward)         Ordered     IP VTE HIGH RISK PATIENT  Once         06/15/22 1957     Place sequential compression device  Until discontinued         06/15/22 1957                   Esdras Arteaga MD  Department of Hospital Medicine   [facility] - Emergency Dept

## 2022-06-16 NOTE — SUBJECTIVE & OBJECTIVE
Interval History: Patient hypertensive overnight and this morning. Patient feels well, has no complaints. Considering surgery.     Review of Systems  Objective:     Vital Signs (Most Recent):  Temp: 98.4 °F (36.9 °C) (06/16/22 1102)  Pulse: (!) 50 (06/16/22 1104)  Resp: 18 (06/16/22 1102)  BP: (!) 183/79 (06/16/22 1104)  SpO2: 95 % (06/16/22 1104)   Vital Signs (24h Range):  Temp:  [98.2 °F (36.8 °C)-99 °F (37.2 °C)] 98.4 °F (36.9 °C)  Pulse:  [40-68] 50  Resp:  [14-19] 18  SpO2:  [93 %-98 %] 95 %  BP: (135-229)/() 183/79     Weight: 124.3 kg (274 lb 0.5 oz)  Body mass index is 40.47 kg/m².    Intake/Output Summary (Last 24 hours) at 6/16/2022 1304  Last data filed at 6/16/2022 0548  Gross per 24 hour   Intake --   Output 700 ml   Net -700 ml      Physical Exam  Constitutional:       General: He is not in acute distress.     Appearance: Normal appearance. He is not toxic-appearing or diaphoretic.   Cardiovascular:      Rate and Rhythm: Regular rhythm. Bradycardia present.   Pulmonary:      Effort: Pulmonary effort is normal. No respiratory distress.      Breath sounds: No wheezing.   Abdominal:      General: Abdomen is flat. There is no distension.      Palpations: Abdomen is soft.      Tenderness: There is no abdominal tenderness. There is no guarding or rebound.   Musculoskeletal:         General: Deformity present.      Cervical back: Normal range of motion and neck supple.   Skin:     General: Skin is warm and dry.   Neurological:      Mental Status: He is alert.       Computed MELD-Na score unavailable. Necessary lab results were not found in the last year.  Computed MELD score unavailable. Necessary lab results were not found in the last year.    Significant Labs:  CBC:  Recent Labs   Lab 06/15/22  1633 06/16/22  1100   WBC 6.67 4.56   HGB 10.7* 10.8*   HCT 35.0* 36.0*    320     CMP:  Recent Labs   Lab 06/15/22  1633 06/16/22  1100    138   K 3.9 3.7    104   CO2 25 24   GLU 68* 81    BUN 10 9   CREATININE 1.1 0.9   CALCIUM 9.2 9.1   PROT 8.0 7.5   ALBUMIN 2.8* 2.6*   BILITOT 0.4 0.5   ALKPHOS 87 84   AST 12 11   ALT 11 7*   ANIONGAP 10 10   EGFRNONAA >60.0 >60.0     PTINR:  No results for input(s): INR in the last 48 hours.    Significant Procedures:   Dobutamine Stress Test with Color Flow: No results found for this or any previous visit.

## 2022-06-16 NOTE — HPI
71 y.o. M with PMHx DM2 and PVD with chronic diabetic foot wounds s/p L sided BKA, HTN, asymptomatic bradycardia, CKD 3 and HLD who presented to the ED for worsening R foot pain and drainage. Pt. With known chronic ulceration/osteomylitis to L heel and ID has recommended BKA for definitive therapy, but the patient reports that he has not been interested in amputation. Today, he had his usual f/u appointment with ID, and the patient was referred to the ED for imaging with bone biopsy and potential abx because of worsening drainage and pain. Pt. Denies any current fevers, chills, nausea, or other systemic signs of infection.

## 2022-06-16 NOTE — ED NOTES
Telemetry Verification   Patient placed on Telemetry Box  Verified with War Room  Box # [id number]   Monitor Tech    Rate    Rhythm

## 2022-06-16 NOTE — ASSESSMENT & PLAN NOTE
-Med rec shows pt. Taking losartan/HCTZ and has been dispensed both the combination of this med as well as the separate pills recently. He is uncertain of what he takes, remarking he only takes one small pill in the am for BP  -Markedly HTN in ED, will start losartan 100mg and HCTZ 25 mg with hydralazine 50 mg PO PRN for BP >170

## 2022-06-16 NOTE — HPI
71M with T2DM, PVD (s/p L sided BKA,) chronic diabetic foot wounds  HTN, and CKD 3 who presented to the ED for worsening R foot pain and drainage. Pt. With known chronic ulceration/osteomylitis to L heel and ID has recommended BKA for definitive therapy, but the patient reports that he has not been interested in amputation. Today, he had his usual f/u appointment with ID, and the patient was referred to the ED for imaging with bone biopsy and potential abx because of worsening drainage and pain. Pt. Denies any current fevers, chills, nausea, or other systemic signs of infection.Vascular surgery was consulted for evaluation

## 2022-06-16 NOTE — ASSESSMENT & PLAN NOTE
Assessment: IDSA moderate to severe diabetic foot infection with left calcaneal osteomyelitis and overlying wound, possible significant PAD. Wound and bone cultures pending. L arterial US + for significant stenoses, L MARIO 1.43. CTA pending.     Plan:  -Left heel wound and bone cultured today.  -ID consulted. Antibiotic plan per ID.  -Appreciate vascular surgery recs. Workup ongoing.   -WB to [facility] for transfers only.   -Dressing changes by nursing, ordered.  -Podiatry will follow.

## 2022-06-16 NOTE — ASSESSMENT & PLAN NOTE
-PVD contributing to current non-healing foot wound. Arterial U/S shows LLE atherosclerotic disease Areas of high-grade stenosis within the L popliteal artery  -Continue home statin, ASA  -Vascular surgery consulted for recommendations regarding potential revascularization although BKA may be the only means of definitive treatment for his foot infection

## 2022-06-16 NOTE — SUBJECTIVE & OBJECTIVE
Scheduled Meds:   aluminum-magnesium hydroxide-simethicone  30 mL Oral QID (AC & HS)    [START ON 6/17/2022] aspirin  81 mg Oral Daily    atorvastatin  80 mg Oral Daily    gabapentin  200 mg Oral BID    hydrALAZINE  25 mg Oral Q8H    insulin detemir U-100  10 Units Subcutaneous QHS    losartan-hydrochlorothiazide 100-25 mg  1 tablet Oral Daily    potassium chloride  40 mEq Oral Once    sucralfate  1 g Oral Q6H    tamsulosin  0.8 mg Oral Daily     Continuous Infusions:  PRN Meds:acetaminophen, albuterol-ipratropium, dextrose 10%, dextrose 10%, glucagon (human recombinant), glucose, glucose, hydrALAZINE, insulin aspart U-100, melatonin, naloxone, ondansetron, oxyCODONE-acetaminophen, prochlorperazine, sodium chloride 0.9%    Review of patient's allergies indicates:  No Known Allergies     Past Medical History:   Diagnosis Date    Arthritis     legs    Diabetes mellitus     Diabetes mellitus, type 2     Hyperlipidemia     Osteomyelitis      Past Surgical History:   Procedure Laterality Date    ANGIOGRAPHY OF LOWER EXTREMITY N/A 2/3/2021    Procedure: Angiogram Extremity Bilateral;  Surgeon: Ernst Chacko MD;  Location: [facility] OR [address] [place] [address];  Service: Peripheral Vascular;  Laterality: N/A;  7.4 mintues fluoroscopy time  816.15 mGy  170.17 Gycm2    AORTOGRAPHY WITH EXTREMITY RUNOFF Bilateral 2/3/2021    Procedure: AORTOGRAM, WITH EXTREMITY RUNOFF;  Surgeon: Ernst Chacko MD;  Location: [facility] OR [address] [place] [address];  Service: Peripheral Vascular;  Laterality: Bilateral;    DEBRIDEMENT OF FOOT Left 2/23/2021    Procedure: DEBRIDEMENT, LEFT HEEL;  Surgeon: Mayra Schroeder DPM;  Location: [facility] OR [address] [place] [address];  Service: Podiatry;  Laterality: Left;    FOOT AMPUTATION  October 2010    left high midfoot amputation       Family History       Problem Relation (Age of Onset)    Cancer Brother    Diabetes Mother, Sister    Heart disease Mother    Stroke Sister          Tobacco Use    Smoking status: Never Smoker    Smokeless  tobacco: Never Used   Substance and Sexual Activity    Alcohol use: No     Comment: occassional    Drug use: No    Sexual activity: Not Currently     Review of Systems   Constitutional:  Negative for activity change, chills, fever and unexpected weight change.   HENT:  Negative for congestion and sore throat.    Respiratory:  Negative for cough, shortness of breath and wheezing.    Cardiovascular:  Negative for chest pain, palpitations and leg swelling.   Gastrointestinal:  Negative for abdominal pain, blood in stool, nausea and vomiting.   Genitourinary:  Negative for dysuria and hematuria.   Musculoskeletal:  Positive for gait problem. Negative for arthralgias and neck pain.   Skin:  Positive for wound. Negative for color change and rash.   Neurological:  Positive for numbness. Negative for dizziness and seizures.   Psychiatric/Behavioral:  Negative for hallucinations and suicidal ideas.    All other systems reviewed and are negative.  Objective:     Vital Signs (Most Recent):  Temp: 98.4 °F (36.9 °C) (06/16/22 1102)  Pulse: (!) 50 (06/16/22 1104)  Resp: 18 (06/16/22 1102)  BP: (!) 183/79 (06/16/22 1104)  SpO2: 95 % (06/16/22 1237)   Vital Signs (24h Range):  Temp:  [98.2 °F (36.8 °C)-98.6 °F (37 °C)] 98.4 °F (36.9 °C)  Pulse:  [40-68] 50  Resp:  [14-19] 18  SpO2:  [93 %-98 %] 95 %  BP: (176-229)/() 183/79     Weight: 124.3 kg (274 lb 0.5 oz)  Body mass index is 40.47 kg/m².    Foot Exam    General  Orientation: alert and oriented to person, place, and time   Affect: appropriate       Right Foot/Ankle     Inspection and Palpation  Ecchymosis: none  Tenderness: none   Swelling: (BLE edema)  Skin Exam: skin intact;     Neurovascular  Dorsalis pedis: doppler  Posterior tibial: doppler  Saphenous nerve sensation: diminished  Tibial nerve sensation: diminished  Superficial peroneal nerve sensation: diminished  Deep peroneal nerve sensation: diminished  Sural nerve sensation: diminished      Left Foot/Ankle       Inspection and Palpation  Ecchymosis: none  Tenderness: (Tenderness to the posterior heel wound)  Swelling: (significant b/l LE edema)  Skin Exam: drainage, skin changes and ulcer;     Neurovascular  Dorsalis pedis: doppler  Posterior tibial: doppler  Saphenous nerve sensation: diminished  Tibial nerve sensation: diminished  Superficial peroneal nerve sensation: diminished  Deep peroneal nerve sensation: diminished  Sural nerve sensation: diminished    Comments  Chopart amputation    Laboratory:  Blood Cultures:   Recent Labs   Lab 06/15/22  1633 06/15/22  1634   LABBLOO No Growth to date No Growth to date     CBC:   Recent Labs   Lab 06/16/22  1100   WBC 4.56   RBC 4.26*   HGB 10.8*   HCT 36.0*      MCV 85   MCH 25.4*   MCHC 30.0*     CMP:   Recent Labs   Lab 06/16/22  1100   GLU 81   CALCIUM 9.1   ALBUMIN 2.6*   PROT 7.5      K 3.7   CO2 24      BUN 9   CREATININE 0.9   ALKPHOS 84   ALT 7*   AST 11   BILITOT 0.5     CRP:   Recent Labs   Lab 06/15/22  1633   CRP 26.7*     ESR:   Recent Labs   Lab 06/15/22  1633   SEDRATE >120*     Microbiology Results (last 7 days)       Procedure Component Value Units Date/Time    Gram stain [[id number]] Collected: 06/16/22 1305    Order Status: Sent Specimen: Wound from Foot, Left Updated: 06/16/22 1357    Culture, Anaerobe [[id number]] Collected: 06/16/22 1305    Order Status: Sent Specimen: Wound from Foot, Left Updated: 06/16/22 1355    Aerobic culture [[id number]] Collected: 06/16/22 1305    Order Status: Sent Specimen: Wound from Foot, Left Updated: 06/16/22 1351    Aerobic culture [[id number]] Collected: 06/16/22 1308    Order Status: Sent Specimen: Bone from Foot, Left Updated: 06/16/22 1330    Gram stain [[id number]] Collected: 06/16/22 1308    Order Status: Sent Specimen: Bone from Foot, Left Updated: 06/16/22 1330    Culture, Anaerobe [[id number]] Collected: 06/16/22 1308    Order Status: Sent Specimen: Bone from Foot, Left Updated: 06/16/22 1327     Blood culture x two cultures. Draw prior to antibiotics. [[id number]] Collected: 06/15/22 1633    Order Status: Completed Specimen: Blood from Peripheral, Antecubital, Right Updated: 06/16/22 0115     Blood Culture, Routine No Growth to date    Narrative:      Aerobic and anaerobic    Blood culture x two cultures. Draw prior to antibiotics. [[id number]] Collected: 06/15/22 1634    Order Status: Completed Specimen: Blood from Peripheral, Antecubital, Left Updated: 06/16/22 0115     Blood Culture, Routine No Growth to date    Narrative:      Aerobic and anaerobic    Urine culture [[id number]] Collected: 06/15/22 1817    Order Status: No result Specimen: Urine Updated: 06/15/22 1919          Specimen (24h ago, onward)                 Start     Ordered    06/16/22 1238  Specimen to Pathology, Surgery Orthopedics  Once        Comments: Pre-op Diagnosis: osteomyelitis Number of specimens: 1Name of specimens: left calcaneus     References:    Click here for ordering Quick Tip   Question Answer Comment   Procedure Type: Orthopedics    Which provider would you like to cc? JG MEJIA    Release to patient Immediate        06/16/22 1238                      Clinical Findings:  -Left posterior heel wound down to subcutaneous tissue with thin soft tissue covering overlying calcaneus. Wound bed viable. Serous drainage. Mild tenderness. Periwound maceration, improving.

## 2022-06-16 NOTE — PROCEDURES
"Saji Castañeda is a 72 y.o. male patient.    Temp: 98.4 °F (36.9 °C) (06/16/22 1102)  Pulse: (!) 50 (06/16/22 1104)  Resp: 18 (06/16/22 1102)  BP: (!) 183/79 (06/16/22 1104)  SpO2: 95 % (06/16/22 1237)  Weight: 124.3 kg (274 lb 0.5 oz) (06/15/22 1347)  Height: 5' 9" (175.3 cm) (06/15/22 1347)       Bone biopsy: left calcaneus    Date/Time: 6/16/2022 12:00 PM  Performed by: Ortiz John MD  Authorized by: Stacey Tafoya DPM     Consent Done?: Yes (Written)      Position: supine  Anesthesia: local infiltration  Local anesthetic: lidocaine 1% without epinephrine  Aspiration?: No    Biopsy?: Yes    Number of Specimens: 2  Estimated blood loss (cc):5  Patient tolerated the procedure well with no immediate complications.    2 specimens of left calcaneus: 1 for micro and 1 for path        6/16/2022  "

## 2022-06-16 NOTE — SUBJECTIVE & OBJECTIVE
"Past Medical History:   Diagnosis Date    Arthritis     legs    Diabetes mellitus     Diabetes mellitus, type 2     Hyperlipidemia     Osteomyelitis        Past Surgical History:   Procedure Laterality Date    ANGIOGRAPHY OF LOWER EXTREMITY N/A 2/3/2021    Procedure: Angiogram Extremity Bilateral;  Surgeon: Ernst Chacko MD;  Location: [facility] OR [address] [place] [address];  Service: Peripheral Vascular;  Laterality: N/A;  7.4 mintues fluoroscopy time  816.15 mGy  170.17 Gycm2    AORTOGRAPHY WITH EXTREMITY RUNOFF Bilateral 2/3/2021    Procedure: AORTOGRAM, WITH EXTREMITY RUNOFF;  Surgeon: Ernst Chacko MD;  Location: [facility] OR [address] [place] [address];  Service: Peripheral Vascular;  Laterality: Bilateral;    DEBRIDEMENT OF FOOT Left 2/23/2021    Procedure: DEBRIDEMENT, LEFT HEEL;  Surgeon: Mayra Schroeder DPM;  Location: [facility] OR [address] [place] [address];  Service: Podiatry;  Laterality: Left;    FOOT AMPUTATION  October 2010    left high midfoot amputation       Review of patient's allergies indicates:  No Known Allergies    No current facility-administered medications on file prior to encounter.     Current Outpatient Medications on File Prior to Encounter   Medication Sig    acetaminophen (TYLENOL) 325 MG tablet Take 2 tablets (650 mg total) by mouth every 6 (six) hours as needed for Pain.    aspirin (ECOTRIN) 81 MG EC tablet Take 81 mg by mouth once daily.    BD ULTRA-FINE ADITYA PEN NEEDLE 32 gauge x 5/32" Ndle USE FOUR TIMES DAILY WITH MEALS AND NIGHTLY    blood sugar diagnostic (CONTOUR TEST STRIPS) Strp Inject 1 strip into the skin 2 (two) times daily before meals.    ELIQUIS 5 mg Tab     gabapentin (NEURONTIN) 100 MG capsule Take 2 capsules (200 mg total) by mouth 2 (two) times daily.    glipiZIDE (GLUCOTROL) 10 MG tablet Take 10 mg by mouth daily with breakfast. HOLD THIS MEDICATION IF YOU ARE SKIPPING A MEAL    hydroCHLOROthiazide (HYDRODIURIL) 25 MG tablet     insulin aspart U-100 (NOVOLOG) 100 unit/mL (3 mL) InPn pen " Inject 0-5 Units into the skin before meals and at bedtime as needed (Hyperglycemia).    insulin detemir U-100 (LEVEMIR FLEXTOUCH) 100 unit/mL (3 mL) SubQ InPn pen Inject 55 Units into the skin every evening.    isosorbide-hydrALAZINE 20-37.5 mg (BIDIL) 20-37.5 mg Tab Take 1 tablet by mouth 3 (three) times daily.    ketoconazole (NIZORAL) 2 % cream Apply topically once daily.    LANTUS SOLOSTAR U-100 INSULIN glargine 100 units/mL (3mL) SubQ pen 68 Units every evening.    losartan (COZAAR) 100 MG tablet     losartan-hydrochlorothiazide 100-25 mg (HYZAAR) 100-25 mg per tablet Take 1 tablet by mouth once daily.    melatonin (MELATIN) 3 mg tablet Take 2 tablets (6 mg total) by mouth nightly as needed for Insomnia.    metFORMIN (GLUCOPHAGE-XR) 500 MG ER 24hr tablet     MICROLET LANCET Misc     multivitamin Tab Take 1 tablet by mouth once daily.    multivitamin with minerals tablet 1 tablet 2 TIMES DAILY (route: oral)    pantoprazole (PROTONIX) 40 MG tablet Take 40 mg by mouth once daily.    polyethylene glycol (GLYCOLAX) 17 gram PwPk Take 17 g by mouth once daily.    rosuvastatin (CRESTOR) 40 MG Tab Take 40 mg by mouth once daily.    senna (SENOKOT) 8.6 mg tablet Take 1 tablet by mouth 2 (two) times daily.    tamsulosin (FLOMAX) 0.4 mg Cap Take 2 capsules (0.8 mg total) by mouth once daily.    triamcinolone acetonide 0.1% (KENALOG) 0.1 % cream Apply topically 2 (two) times daily. Apply to affected area twice daily as directed.    VITAMIN C WITH CARLOS HIPS 500 MG tablet Take 500 mg by mouth 2 (two) times daily.    VITAMIN D2 1,250 mcg (50,000 unit) capsule Take 50,000 Units by mouth every 7 days.    zinc sulfate (ZINCATE) 220 (50) mg capsule Take 1 capsule (220 mg total) by mouth once daily.    [DISCONTINUED] ciprofloxacin HCl (CIPRO) 500 MG tablet Take 1 tablet by mouth 2 (two) times daily.    [DISCONTINUED] furosemide (LASIX) 40 MG tablet Take 40 mg by mouth every other day.    [DISCONTINUED]  metroNIDAZOLE (FLAGYL) 500 MG tablet Take 1 tablet (500 mg total) by mouth every 8 (eight) hours.     Family History       Problem Relation (Age of Onset)    Cancer Brother    Diabetes Mother, Sister    Heart disease Mother    Stroke Sister          Tobacco Use    Smoking status: Never Smoker    Smokeless tobacco: Never Used   Substance and Sexual Activity    Alcohol use: No     Comment: occassional    Drug use: No    Sexual activity: Not Currently     Review of Systems   Constitutional:  Negative for activity change, chills, fever and unexpected weight change.   HENT:  Negative for congestion and sore throat.    Respiratory:  Negative for cough, shortness of breath and wheezing.    Cardiovascular:  Negative for chest pain, palpitations and leg swelling.   Gastrointestinal:  Negative for abdominal pain, blood in stool, nausea and vomiting.   Genitourinary:  Negative for dysuria and hematuria.   Musculoskeletal:  Negative for arthralgias and neck pain.   Skin:  Positive for wound. Negative for color change and rash.        Owrsening L foot wound with drainage   Neurological:  Negative for dizziness, seizures and numbness.   Psychiatric/Behavioral:  Negative for hallucinations and suicidal ideas.    Objective:     Vital Signs (Most Recent):  Temp: 99 °F (37.2 °C) (06/15/22 1347)  Pulse: (!) 40 (06/15/22 2103)  Resp: 15 (06/15/22 2103)  BP: (!) 182/77 (06/15/22 2103)  SpO2: 95 % (06/15/22 2103)   Vital Signs (24h Range):  Temp:  [99 °F (37.2 °C)] 99 °F (37.2 °C)  Pulse:  [40-55] 40  Resp:  [15-18] 15  SpO2:  [95 %-96 %] 95 %  BP: (135-229)/(52-89) 182/77     Weight: 124.3 kg (274 lb 0.5 oz)  Body mass index is 40.47 kg/m².    Physical Exam  Vitals reviewed.   Constitutional:       General: He is not in acute distress.     Appearance: He is well-developed.   HENT:      Head: Normocephalic and atraumatic.   Eyes:      Extraocular Movements: Extraocular movements intact.      Pupils: Pupils are equal, round, and  reactive to light.   Neck:      Vascular: No JVD.      Trachea: No tracheal deviation.   Cardiovascular:      Rate and Rhythm: Regular rhythm. Bradycardia present.      Pulses:           Dorsalis pedis pulses are 0 on the right side and 0 on the left side.      Heart sounds: No murmur heard.    No friction rub. No gallop.   Pulmonary:      Effort: No respiratory distress.      Breath sounds: Normal breath sounds. No wheezing or rales.   Abdominal:      General: Bowel sounds are normal. There is no distension.      Palpations: Abdomen is soft. There is no mass.      Tenderness: There is no abdominal tenderness.   Musculoskeletal:         General: No deformity.      Cervical back: Neck supple.      Comments: LLE BKA, RLE with extensive foot swelling and open wound to heal with purulent drainage   Lymphadenopathy:      Cervical: No cervical adenopathy.   Skin:     General: Skin is warm and dry.      Findings: No rash.   Neurological:      Mental Status: He is alert and oriented to person, place, and time.         CRANIAL NERVES     CN III, IV, VI   Pupils are equal, round, and reactive to light.     Significant Labs: All pertinent labs within the past 24 hours have been reviewed.    Significant Imaging: I have reviewed all pertinent imaging results/findings within the past 24 hours.

## 2022-06-16 NOTE — CONSULTS
"[facility] - Telemetry Stepdown  Vascular Surgery  Consult Note    Consults  Subjective:     Chief Complaint/Reason for Admission: LLE wound    History of Present Illness: 71M with T2DM, PVD (s/p L sided BKA,) chronic diabetic foot wounds  HTN, and CKD 3 who presented to the ED for worsening R foot pain and drainage. Pt. With known chronic ulceration/osteomylitis to L heel and ID has recommended BKA for definitive therapy, but the patient reports that he has not been interested in amputation. Today, he had his usual f/u appointment with ID, and the patient was referred to the ED for imaging with bone biopsy and potential abx because of worsening drainage and pain. Pt. Denies any current fevers, chills, nausea, or other systemic signs of infection.Vascular surgery was consulted for evaluation         Medications Prior to Admission   Medication Sig Dispense Refill Last Dose    acetaminophen (TYLENOL) 325 MG tablet Take 2 tablets (650 mg total) by mouth every 6 (six) hours as needed for Pain.  0     aspirin (ECOTRIN) 81 MG EC tablet Take 81 mg by mouth once daily.       BD ULTRA-FINE ADITYA PEN NEEDLE 32 gauge x 5/32" Ndle USE FOUR TIMES DAILY WITH MEALS AND NIGHTLY 100 each 0     blood sugar diagnostic (CONTOUR TEST STRIPS) Strp Inject 1 strip into the skin 2 (two) times daily before meals. 100 strip 6     ELIQUIS 5 mg Tab        gabapentin (NEURONTIN) 100 MG capsule Take 2 capsules (200 mg total) by mouth 2 (two) times daily. 120 capsule 1     glipiZIDE (GLUCOTROL) 10 MG tablet Take 10 mg by mouth daily with breakfast. HOLD THIS MEDICATION IF YOU ARE SKIPPING A MEAL       hydroCHLOROthiazide (HYDRODIURIL) 25 MG tablet        insulin aspart U-100 (NOVOLOG) 100 unit/mL (3 mL) InPn pen Inject 0-5 Units into the skin before meals and at bedtime as needed (Hyperglycemia).  0     insulin detemir U-100 (LEVEMIR FLEXTOUCH) 100 unit/mL (3 mL) SubQ InPn pen Inject 55 Units into the skin every evening.  0     " isosorbide-hydrALAZINE 20-37.5 mg (BIDIL) 20-37.5 mg Tab Take 1 tablet by mouth 3 (three) times daily. 90 tablet 2     ketoconazole (NIZORAL) 2 % cream Apply topically once daily. 60 g 1     LANTUS SOLOSTAR U-100 INSULIN glargine 100 units/mL (3mL) SubQ pen 68 Units every evening.       losartan (COZAAR) 100 MG tablet        losartan-hydrochlorothiazide 100-25 mg (HYZAAR) 100-25 mg per tablet Take 1 tablet by mouth once daily.       melatonin (MELATIN) 3 mg tablet Take 2 tablets (6 mg total) by mouth nightly as needed for Insomnia.  0     metFORMIN (GLUCOPHAGE-XR) 500 MG ER 24hr tablet        MICROLET LANCET Misc   0     multivitamin Tab Take 1 tablet by mouth once daily.       multivitamin with minerals tablet 1 tablet 2 TIMES DAILY (route: oral)       pantoprazole (PROTONIX) 40 MG tablet Take 40 mg by mouth once daily.       polyethylene glycol (GLYCOLAX) 17 gram PwPk Take 17 g by mouth once daily.  0     rosuvastatin (CRESTOR) 40 MG Tab Take 40 mg by mouth once daily.       senna (SENOKOT) 8.6 mg tablet Take 1 tablet by mouth 2 (two) times daily.       tamsulosin (FLOMAX) 0.4 mg Cap Take 2 capsules (0.8 mg total) by mouth once daily. 90 capsule 3     triamcinolone acetonide 0.1% (KENALOG) 0.1 % cream Apply topically 2 (two) times daily. Apply to affected area twice daily as directed. 453.6 g 0     VITAMIN C WITH CARLOS HIPS 500 MG tablet Take 500 mg by mouth 2 (two) times daily.       VITAMIN D2 1,250 mcg (50,000 unit) capsule Take 50,000 Units by mouth every 7 days.       zinc sulfate (ZINCATE) 220 (50) mg capsule Take 1 capsule (220 mg total) by mouth once daily.          Review of patient's allergies indicates:  No Known Allergies    Past Medical History:   Diagnosis Date    Arthritis     legs    Diabetes mellitus     Diabetes mellitus, type 2     Hyperlipidemia     Osteomyelitis      Past Surgical History:   Procedure Laterality Date    ANGIOGRAPHY OF LOWER EXTREMITY N/A 2/3/2021     Procedure: Angiogram Extremity Bilateral;  Surgeon: Ernst Chacko MD;  Location: [facility] OR 2ND FLR;  Service: Peripheral Vascular;  Laterality: N/A;  7.4 mintues fluoroscopy time  816.15 mGy  170.17 Gycm2    AORTOGRAPHY WITH EXTREMITY RUNOFF Bilateral 2/3/2021    Procedure: AORTOGRAM, WITH EXTREMITY RUNOFF;  Surgeon: Ernst Chacko MD;  Location: [facility] OR 2ND FLR;  Service: Peripheral Vascular;  Laterality: Bilateral;    DEBRIDEMENT OF FOOT Left 2/23/2021    Procedure: DEBRIDEMENT, LEFT HEEL;  Surgeon: Mayra Schroeder DPM;  Location: [facility] OR 1ST FLR;  Service: Podiatry;  Laterality: Left;    FOOT AMPUTATION  October 2010    left high midfoot amputation     Family History       Problem Relation (Age of Onset)    Cancer Brother    Diabetes Mother, Sister    Heart disease Mother    Stroke Sister          Tobacco Use    Smoking status: Never Smoker    Smokeless tobacco: Never Used   Substance and Sexual Activity    Alcohol use: No     Comment: occassional    Drug use: No    Sexual activity: Not Currently     Review of Systems   All other systems reviewed and are negative.  Objective:     Vital Signs (Most Recent):  Temp: 98.2 °F (36.8 °C) (06/16/22 0341)  Pulse: 64 (06/16/22 0739)  Resp: 14 (06/16/22 0341)  BP: (!) 221/90 (06/16/22 0807)  SpO2: 98 % (06/16/22 0341)   Vital Signs (24h Range):  Temp:  [98.2 °F (36.8 °C)-99 °F (37.2 °C)] 98.2 °F (36.8 °C)  Pulse:  [40-68] 64  Resp:  [14-19] 14  SpO2:  [95 %-98 %] 98 %  BP: (135-229)/() 221/90     Weight: 124.3 kg (274 lb 0.5 oz)  Body mass index is 40.47 kg/m².    Physical Exam  Vitals reviewed.   Constitutional:       General: He is not in acute distress.     Appearance: He is well-developed.   HENT:      Head: Normocephalic and atraumatic.   Eyes:      Extraocular Movements: Extraocular movements intact.      Pupils: Pupils are equal, round, and reactive to light.   Neck:      Vascular: No JVD.      Trachea: No tracheal deviation.    Cardiovascular:      Rate and Rhythm: Regular rhythm. Bradycardia present.      Pulses:           Dorsalis pedis pulses are 0 on the right side and 0 on the left side.      Heart sounds: No murmur heard.    No friction rub. No gallop.   Pulmonary:      Effort: No respiratory distress.      Breath sounds: Normal breath sounds. No wheezing or rales.   Abdominal:      General: Bowel sounds are normal. There is no distension.      Palpations: Abdomen is soft. There is no mass.      Tenderness: There is no abdominal tenderness.   Musculoskeletal:         General: No deformity.      Cervical back: Neck supple.      Comments: LLE BKA, RLE with extensive foot swelling and open wound to heal with purulent drainage   Lymphadenopathy:      Cervical: No cervical adenopathy.   Skin:     General: Skin is warm and dry.      Findings: No rash.   Neurological:      Mental Status: He is alert and oriented to person, place, and time.           Significant Labs:  All pertinent labs from the last 24 hours have been reviewed.    Significant Diagnostics:  I have reviewed all pertinent imaging results/findings within the past 24 hours.    Assessment/Plan:     Peripheral arterial disease  Saji Castañeda is a 70 y.o. male who has a past medical history of Arthritis, Diabetes mellitus, Diabetes mellitus, type 2, Hyperlipidemia, and Osteomyelitis, chronic lymphedema with chronic bilateral lower extremity wounds who presents with worsening and nonhealing left lower extremity wounds:     -- Angiogram from 2/2021: Tortuous but non-diseased aortoiliac system, Left Femoral popliteal segments without disease, Left tibial vessels without disease, 3 vessel runoff to foot, R femoral popliteal segments without disease  , and R 2 vessel runoff to foot.  -- MARIO/TBI  -- Heel protectors   -- Rec consult to podiatry for possible debridement   -- Will consider repeat angiogram pending discussion with staff           Thank you for your consult. I will  follow-up with patient. Please contact us if you have any additional questions.    Ervin Lopez MD  Vascular Surgery  [facility] - Telemetry Stepdown

## 2022-06-16 NOTE — PROGRESS NOTES
Pharmacokinetic Initial Assessment: IV Vancomycin    Assessment/Plan:    Initiate intravenous vancomycin with loading dose of 2000 mg once followed by a maintenance dose of vancomycin 1750mg IV every 12 hours  Desired empiric serum trough concentration is 15 to 20 mcg/mL  Draw vancomycin trough level 60 min prior to fourth dose on 6/18 at approximately 0430  Pharmacy will continue to follow and monitor vancomycin.      Please contact pharmacy at extension [phone] with any questions regarding this assessment.     Thank you for the consult,   Stefan Joya       Patient brief summary:  Saji Castañeda is a 72 y.o. male initiated on antimicrobial therapy with IV Vancomycin for treatment of suspected bone/joint infection    Drug Allergies:   Review of patient's allergies indicates:  No Known Allergies    Actual Body Weight:   124.3kg    Renal Function:   Estimated Creatinine Clearance: 95.2 mL/min (based on SCr of 0.9 mg/dL).,     Dialysis Method (if applicable):  N/A    CBC (last 72 hours):  Recent Labs   Lab Result Units 06/15/22  1633 06/16/22  1100   WBC K/uL 6.67 4.56   Hemoglobin g/dL 10.7* 10.8*   Hemoglobin A1C %  --  9.5*   Hematocrit % 35.0* 36.0*   Platelets K/uL 341 320   Gran % % 59.1 59.0   Lymph % % 25.6 24.6   Mono % % 9.9 10.5   Eosinophil % % 4.5 5.3   Basophil % % 0.6 0.4   Differential Method  Automated Automated       Metabolic Panel (last 72 hours):  Recent Labs   Lab Result Units 06/15/22  1633 06/15/22  1817 06/16/22  1100   Sodium mmol/L 140  --  138   Potassium mmol/L 3.9  --  3.7   Chloride mmol/L 105  --  104   CO2 mmol/L 25  --  24   Glucose mg/dL 68*  --  81   Glucose, UA   --  Negative  --    BUN mg/dL 10  --  9   Creatinine mg/dL 1.1  --  0.9   Albumin g/dL 2.8*  --  2.6*   Total Bilirubin mg/dL 0.4  --  0.5   Alkaline Phosphatase U/L 87  --  84   AST U/L 12  --  11   ALT U/L 11  --  7*   Magnesium mg/dL 1.7  --   --    Phosphorus mg/dL 3.2  --   --        Drug levels (last 3 results):  No  results for input(s): VANCOMYCINRA, VANCORANDOM, VANCOMYCINPE, VANCOPEAK, VANCOMYCINTR, VANCOTROUGH in the last 72 hours.    Microbiologic Results:  Microbiology Results (last 7 days)     Procedure Component Value Units Date/Time    Gram stain [[id number]] Collected: 06/16/22 1305    Order Status: Sent Specimen: Wound from Foot, Left Updated: 06/16/22 1357    Culture, Anaerobe [[id number]] Collected: 06/16/22 1305    Order Status: Sent Specimen: Wound from Foot, Left Updated: 06/16/22 1355    Aerobic culture [[id number]] Collected: 06/16/22 1305    Order Status: Sent Specimen: Wound from Foot, Left Updated: 06/16/22 1351    Aerobic culture [[id number]] Collected: 06/16/22 1308    Order Status: Sent Specimen: Bone from Foot, Left Updated: 06/16/22 1330    Gram stain [[id number]] Collected: 06/16/22 1308    Order Status: Sent Specimen: Bone from Foot, Left Updated: 06/16/22 1330    Culture, Anaerobe [[id number]] Collected: 06/16/22 1308    Order Status: Sent Specimen: Bone from Foot, Left Updated: 06/16/22 1327    Blood culture x two cultures. Draw prior to antibiotics. [[id number]] Collected: 06/15/22 1633    Order Status: Completed Specimen: Blood from Peripheral, Antecubital, Right Updated: 06/16/22 0115     Blood Culture, Routine No Growth to date    Narrative:      Aerobic and anaerobic    Blood culture x two cultures. Draw prior to antibiotics. [[id number]] Collected: 06/15/22 1634    Order Status: Completed Specimen: Blood from Peripheral, Antecubital, Left Updated: 06/16/22 0115     Blood Culture, Routine No Growth to date    Narrative:      Aerobic and anaerobic    Urine culture [[id number]] Collected: 06/15/22 1817    Order Status: No result Specimen: Urine Updated: 06/15/22 1919

## 2022-06-16 NOTE — ASSESSMENT & PLAN NOTE
-Med rec shows pt. Taking losartan/HCTZ and has been dispensed both the combination of this med as well as the separate pills recently. He is uncertain of what he takes, remarking he only takes one small pill in the am for BP  -Markedly HTN in ED, started losartan 100mg and HCTZ 25 mg with hydralazine 25 mg PO q8h

## 2022-06-17 LAB
POCT GLUCOSE: 215 MG/DL (ref 70–110)
POCT GLUCOSE: 229 MG/DL (ref 70–110)
POCT GLUCOSE: 251 MG/DL (ref 70–110)
POCT GLUCOSE: 330 MG/DL (ref 70–110)

## 2022-06-17 PROCEDURE — 99223 1ST HOSP IP/OBS HIGH 75: CPT | Mod: ,,, | Performed by: INTERNAL MEDICINE

## 2022-06-17 PROCEDURE — 99232 PR SUBSEQUENT HOSPITAL CARE,LEVL II: ICD-10-PCS | Mod: ,,, | Performed by: STUDENT IN AN ORGANIZED HEALTH CARE EDUCATION/TRAINING PROGRAM

## 2022-06-17 PROCEDURE — 99223 PR INITIAL HOSPITAL CARE,LEVL III: ICD-10-PCS | Mod: ,,, | Performed by: INTERNAL MEDICINE

## 2022-06-17 PROCEDURE — 20220 BONE BIOPSY TROCAR/NDL SUPFC: CPT | Mod: ,,, | Performed by: PODIATRIST

## 2022-06-17 PROCEDURE — 99223 PR INITIAL HOSPITAL CARE,LEVL III: ICD-10-PCS | Mod: 25,,, | Performed by: PODIATRIST

## 2022-06-17 PROCEDURE — 25000003 PHARM REV CODE 250: Performed by: HOSPITALIST

## 2022-06-17 PROCEDURE — 63600175 PHARM REV CODE 636 W HCPCS: Performed by: STUDENT IN AN ORGANIZED HEALTH CARE EDUCATION/TRAINING PROGRAM

## 2022-06-17 PROCEDURE — 25000003 PHARM REV CODE 250: Performed by: STUDENT IN AN ORGANIZED HEALTH CARE EDUCATION/TRAINING PROGRAM

## 2022-06-17 PROCEDURE — 20600001 HC STEP DOWN PRIVATE ROOM

## 2022-06-17 PROCEDURE — 99232 SBSQ HOSP IP/OBS MODERATE 35: CPT | Mod: ,,, | Performed by: STUDENT IN AN ORGANIZED HEALTH CARE EDUCATION/TRAINING PROGRAM

## 2022-06-17 PROCEDURE — 99223 1ST HOSP IP/OBS HIGH 75: CPT | Mod: 25,,, | Performed by: PODIATRIST

## 2022-06-17 PROCEDURE — 20220 PR BONE BIOPSY,TROCAR/NEEDLE SUPERF: ICD-10-PCS | Mod: ,,, | Performed by: PODIATRIST

## 2022-06-17 RX ADMIN — ALUMINUM HYDROXIDE, MAGNESIUM HYDROXIDE, AND SIMETHICONE 30 ML: 200; 200; 20 SUSPENSION ORAL at 04:06

## 2022-06-17 RX ADMIN — HYDRALAZINE HYDROCHLORIDE 25 MG: 25 TABLET, FILM COATED ORAL at 09:06

## 2022-06-17 RX ADMIN — INSULIN ASPART 4 UNITS: 100 INJECTION, SOLUTION INTRAVENOUS; SUBCUTANEOUS at 05:06

## 2022-06-17 RX ADMIN — TAMSULOSIN HYDROCHLORIDE 0.8 MG: 0.4 CAPSULE ORAL at 08:06

## 2022-06-17 RX ADMIN — ALUMINUM HYDROXIDE, MAGNESIUM HYDROXIDE, AND SIMETHICONE 30 ML: 200; 200; 20 SUSPENSION ORAL at 06:06

## 2022-06-17 RX ADMIN — VANCOMYCIN HYDROCHLORIDE 1750 MG: 10 INJECTION, POWDER, LYOPHILIZED, FOR SOLUTION INTRAVENOUS at 05:06

## 2022-06-17 RX ADMIN — ALUMINUM HYDROXIDE, MAGNESIUM HYDROXIDE, AND SIMETHICONE 30 ML: 200; 200; 20 SUSPENSION ORAL at 12:06

## 2022-06-17 RX ADMIN — LOSARTAN POTASSIUM AND HYDROCHLOROTHIAZIDE 1 TABLET: 100; 25 TABLET, FILM COATED ORAL at 08:06

## 2022-06-17 RX ADMIN — VANCOMYCIN HYDROCHLORIDE 1750 MG: 10 INJECTION, POWDER, LYOPHILIZED, FOR SOLUTION INTRAVENOUS at 06:06

## 2022-06-17 RX ADMIN — CEFEPIME HYDROCHLORIDE 2 G: 2 INJECTION, SOLUTION INTRAVENOUS at 04:06

## 2022-06-17 RX ADMIN — SUCRALFATE 1 G: 1 SUSPENSION ORAL at 12:06

## 2022-06-17 RX ADMIN — SUCRALFATE 1 G: 1 SUSPENSION ORAL at 02:06

## 2022-06-17 RX ADMIN — HYDRALAZINE HYDROCHLORIDE 25 MG: 25 TABLET, FILM COATED ORAL at 06:06

## 2022-06-17 RX ADMIN — CEFEPIME HYDROCHLORIDE 2 G: 2 INJECTION, SOLUTION INTRAVENOUS at 09:06

## 2022-06-17 RX ADMIN — CEFEPIME HYDROCHLORIDE 2 G: 2 INJECTION, SOLUTION INTRAVENOUS at 02:06

## 2022-06-17 RX ADMIN — INSULIN ASPART 4 UNITS: 100 INJECTION, SOLUTION INTRAVENOUS; SUBCUTANEOUS at 08:06

## 2022-06-17 RX ADMIN — ATORVASTATIN CALCIUM 80 MG: 20 TABLET, FILM COATED ORAL at 08:06

## 2022-06-17 RX ADMIN — INSULIN ASPART 4 UNITS: 100 INJECTION, SOLUTION INTRAVENOUS; SUBCUTANEOUS at 09:06

## 2022-06-17 RX ADMIN — ASPIRIN 81 MG: 81 TABLET, COATED ORAL at 08:06

## 2022-06-17 RX ADMIN — HYDRALAZINE HYDROCHLORIDE 25 MG: 25 TABLET, FILM COATED ORAL at 03:06

## 2022-06-17 RX ADMIN — GABAPENTIN 200 MG: 100 CAPSULE ORAL at 09:06

## 2022-06-17 RX ADMIN — ALUMINUM HYDROXIDE, MAGNESIUM HYDROXIDE, AND SIMETHICONE 30 ML: 200; 200; 20 SUSPENSION ORAL at 09:06

## 2022-06-17 RX ADMIN — SUCRALFATE 1 G: 1 SUSPENSION ORAL at 05:06

## 2022-06-17 RX ADMIN — GABAPENTIN 200 MG: 100 CAPSULE ORAL at 08:06

## 2022-06-17 RX ADMIN — SUCRALFATE 1 G: 1 SUSPENSION ORAL at 06:06

## 2022-06-17 NOTE — ASSESSMENT & PLAN NOTE
-Last A1c 2020, repeat ordered. Per pt., taking 68 units lantus QHS along with metformin  mg and glipizide 10 mg daily  -Mildly hypoglycemic on admit to 63, moderate dose SSI ordered   -give decreased dose of 15 units levemir, Titrate up as needed

## 2022-06-17 NOTE — PROGRESS NOTES
[facility] - Telemetry Stepdown  Podiatry  Progress Note    Patient Name: Saji Castañeda  MRN: [id number]  Admission Date: 6/15/2022  Hospital Length of Stay: 2 days  Attending Physician: Ortiz Bob*  Primary Care Provider: Mart Escalante MD     Subjective:     Interval History: Tmax 100.7 F, other vitals stable. No new pedal complaints. Dressings clean dry, intact.       Scheduled Meds:   aluminum-magnesium hydroxide-simethicone  30 mL Oral QID (AC & HS)    aspirin  81 mg Oral Daily    atorvastatin  80 mg Oral Daily    ceFEPime (MAXIPIME) IVPB  2 g Intravenous Q8H    gabapentin  200 mg Oral BID    hydrALAZINE  25 mg Oral Q8H    insulin detemir U-100  15 Units Subcutaneous QHS    losartan-hydrochlorothiazide 100-25 mg  1 tablet Oral Daily    sucralfate  1 g Oral Q6H    tamsulosin  0.8 mg Oral Daily    vancomycin (VANCOCIN) IVPB  1,750 mg Intravenous Q12H     Continuous Infusions:  PRN Meds:acetaminophen, albuterol-ipratropium, dextrose 10%, dextrose 10%, glucagon (human recombinant), glucose, glucose, hydrALAZINE, insulin aspart U-100, melatonin, naloxone, ondansetron, oxyCODONE-acetaminophen, prochlorperazine, sodium chloride 0.9%    Review of Systems   Constitutional:  Negative for activity change, chills, fever and unexpected weight change.   HENT:  Negative for congestion and sore throat.    Respiratory:  Negative for cough, shortness of breath and wheezing.    Cardiovascular:  Negative for chest pain, palpitations and leg swelling.   Gastrointestinal:  Negative for abdominal pain, blood in stool, nausea and vomiting.   Genitourinary:  Negative for dysuria and hematuria.   Musculoskeletal:  Positive for gait problem. Negative for arthralgias and neck pain.   Skin:  Positive for wound. Negative for color change and rash.   Neurological:  Positive for numbness. Negative for dizziness and seizures.   Psychiatric/Behavioral:  Negative for hallucinations and suicidal ideas.    All other systems  reviewed and are negative.  Objective:     Vital Signs (Most Recent):  Temp: 98.9 °F (37.2 °C) (06/17/22 1504)  Pulse: 75 (06/17/22 1504)  Resp: 20 (06/17/22 1504)  BP: (!) 141/64 (06/17/22 1504)  SpO2: 96 % (06/17/22 1637)   Vital Signs (24h Range):  Temp:  [97.9 °F (36.6 °C)-100.7 °F (38.2 °C)] 98.9 °F (37.2 °C)  Pulse:  [48-79] 75  Resp:  [16-20] 20  SpO2:  [91 %-96 %] 96 %  BP: (136-187)/(61-78) 141/64     Weight: 124.3 kg (274 lb 0.5 oz)  Body mass index is 40.47 kg/m².    Foot Exam    General  Orientation: alert and oriented to person, place, and time   Affect: appropriate       Right Foot/Ankle     Inspection and Palpation  Ecchymosis: none  Tenderness: none   Swelling: (BLE edema)  Skin Exam: skin intact;     Neurovascular  Dorsalis pedis: doppler  Posterior tibial: doppler  Saphenous nerve sensation: diminished  Tibial nerve sensation: diminished  Superficial peroneal nerve sensation: diminished  Deep peroneal nerve sensation: diminished  Sural nerve sensation: diminished      Left Foot/Ankle      Inspection and Palpation  Ecchymosis: none  Tenderness: (Tenderness to the posterior heel wound)  Swelling: (significant b/l LE edema)  Skin Exam: drainage, skin changes and ulcer;     Neurovascular  Dorsalis pedis: doppler  Posterior tibial: doppler  Saphenous nerve sensation: diminished  Tibial nerve sensation: diminished  Superficial peroneal nerve sensation: diminished  Deep peroneal nerve sensation: diminished  Sural nerve sensation: diminished    Comments  Chopart amputation    Laboratory:  Blood Cultures: No results for input(s): LABBLOO in the last 48 hours.  CBC:   Recent Labs   Lab 06/16/22  1100   WBC 4.56   RBC 4.26*   HGB 10.8*   HCT 36.0*      MCV 85   MCH 25.4*   MCHC 30.0*     CMP:   Recent Labs   Lab 06/16/22  1100   GLU 81   CALCIUM 9.1   ALBUMIN 2.6*   PROT 7.5      K 3.7   CO2 24      BUN 9   CREATININE 0.9   ALKPHOS 84   ALT 7*   AST 11   BILITOT 0.5     CRP:   Recent Labs    Lab 06/15/22  1633   CRP 26.7*     ESR:   Recent Labs   Lab 06/15/22  1633   SEDRATE >120*     Microbiology Results (last 7 days)       Procedure Component Value Units Date/Time    Aerobic culture [[id number]]  (Abnormal) Collected: 06/16/22 1305    Order Status: Completed Specimen: Wound from Foot, Left Updated: 06/17/22 1030     Aerobic Bacterial Culture STREPTOCOCCUS AGALACTIAE (GROUP B)  Moderate  Beta-hemolytic streptococci are routinely susceptible to   penicillins,cephalosporins and carbapenems.  Susceptibility testing not routinely performed        STAPHYLOCOCCUS AUREUS  Moderate  Susceptibility pending      Culture, Anaerobe [[id number]] Collected: 06/16/22 1308    Order Status: Completed Specimen: Bone from Foot, Left Updated: 06/17/22 0721     Anaerobic Culture Culture in progress    Culture, Anaerobe [[id number]] Collected: 06/16/22 1305    Order Status: Completed Specimen: Wound from Foot, Left Updated: 06/17/22 0721     Anaerobic Culture Culture in progress    Aerobic culture [[id number]] Collected: 06/16/22 1308    Order Status: Completed Specimen: Bone from Foot, Left Updated: 06/17/22 0700     Aerobic Bacterial Culture No growth    Urine culture [[id number]] Collected: 06/15/22 1817    Order Status: Completed Specimen: Urine Updated: 06/16/22 2030     Urine Culture, Routine No growth    Narrative:      Specimen Source->Urine    Gram stain [[id number]] Collected: 06/16/22 1308    Order Status: Completed Specimen: Bone from Foot, Left Updated: 06/16/22 1813     Gram Stain Result No WBC's      No organisms seen    Blood culture x two cultures. Draw prior to antibiotics. [[id number]] Collected: 06/15/22 1634    Order Status: Completed Specimen: Blood from Peripheral, Antecubital, Left Updated: 06/16/22 1812     Blood Culture, Routine No Growth to date      No Growth to date    Narrative:      Aerobic and anaerobic    Blood culture x two cultures. Draw prior to antibiotics. [[id number]] Collected: 06/15/22  1633    Order Status: Completed Specimen: Blood from Peripheral, Antecubital, Right Updated: 06/16/22 1812     Blood Culture, Routine No Growth to date      No Growth to date    Narrative:      Aerobic and anaerobic    Gram stain [[id number]] Collected: 06/16/22 1305    Order Status: Completed Specimen: Wound from Foot, Left Updated: 06/16/22 1803     Gram Stain Result Rare WBC's      Rare Gram positive cocci          Specimen (24h ago, onward)                None            Clinical Findings:  -Left posterior heel wound down to subcutaneous tissue with thin soft tissue covering overlying calcaneus. Wound bed viable. Serous drainage. Mild tenderness. Periwound maceration improved. Periwound callus.              Assessment/Plan:     * Osteomyelitis of left lower extremity  Assessment: IDSA moderate to severe diabetic foot infection with left calcaneal osteomyelitis and overlying wound, possible significant PAD. Wound culture + for GBS and Staph aureus, bone culture pending. L arterial US + for significant stenoses, L MARIO 1.43.     Plan:  -No surgical intervention planned by podiatry. Other services have recommended major amputation, patient not amenable at this time. Attempting limb salvage with wound care and antibiotics.   -Antibiotic plan per ID.  -Appreciate vascular surgery recs: patient will eventually need major amputation.   -WB to E for transfers only.   -Dressing changes by nursing.   -Podiatry will follow.     Foot pain, left  See rest of plan    Peripheral arterial disease  Per vascular surgery          Ortiz John DPM PGY-2  Podiatric Medicine & Surgery  [facility]  Secure Chat Preferred  Mobile: [phone]  Pager: [phone]

## 2022-06-17 NOTE — PROGRESS NOTES
[facility] - Telemetry [facility] Medicine  Progress Note    Patient Name: Saji Castañeda  MRN: [id number]  Patient Class: IP- Inpatient   Admission Date: 6/15/2022  Length of Stay: 2 days  Attending Physician: Ortiz Bob*  Primary Care Provider: Mart Escalante MD        Subjective:     Principal Problem:Osteomyelitis of left lower extremity        HPI:  71 y.o. M with PMHx DM2 and PVD with chronic diabetic foot wounds s/p L sided BKA, HTN, asymptomatic bradycardia, CKD 3 and HLD who presented to the ED for worsening R foot pain and drainage. Pt. With known chronic ulceration/osteomylitis to L heel and ID has recommended BKA for definitive therapy, but the patient reports that he has not been interested in amputation. Today, he had his usual f/u appointment with ID, and the patient was referred to the ED for imaging with bone biopsy and potential abx because of worsening drainage and pain. Pt. Denies any current fevers, chills, nausea, or other systemic signs of infection.      Overview/Hospital Course:  Patient admitted to hospital medicine. Podiatry and vascular surgery consulted. Patient underwent bone biopsy and culture. Infectious disease consulted.       Interval History: Culture positive GBS and SA. Awaiting decision regarding surgery.     Review of Systems  Objective:     Vital Signs (Most Recent):  Temp: 99.2 °F (37.3 °C) (06/17/22 1128)  Pulse: 70 (06/17/22 1128)  Resp: 18 (06/17/22 1128)  BP: 136/67 (06/17/22 1128)  SpO2: (!) 92 % (06/17/22 1237)   Vital Signs (24h Range):  Temp:  [97.5 °F (36.4 °C)-100.7 °F (38.2 °C)] 99.2 °F (37.3 °C)  Pulse:  [48-83] 70  Resp:  [16-20] 18  SpO2:  [91 %-95 %] 92 %  BP: (136-187)/(67-78) 136/67     Weight: 124.3 kg (274 lb 0.5 oz)  Body mass index is 40.47 kg/m².    Intake/Output Summary (Last 24 hours) at 6/17/2022 1337  Last data filed at 6/17/2022 1254  Gross per 24 hour   Intake 600 ml   Output 1700 ml   Net -1100 ml      Physical  Exam  Constitutional:       General: He is not in acute distress.     Appearance: Normal appearance. He is not toxic-appearing or diaphoretic.   Cardiovascular:      Rate and Rhythm: Regular rhythm.   Pulmonary:      Effort: Pulmonary effort is normal. No respiratory distress.      Breath sounds: No wheezing.   Abdominal:      General: Abdomen is flat. There is no distension.      Palpations: Abdomen is soft.      Tenderness: There is no abdominal tenderness. There is no guarding or rebound.   Musculoskeletal:         General: Deformity present.      Cervical back: Normal range of motion and neck supple.   Skin:     General: Skin is warm and dry.   Neurological:      Mental Status: He is alert.       Computed MELD-Na score unavailable. Necessary lab results were not found in the last year.  Computed MELD score unavailable. Necessary lab results were not found in the last year.    Significant Labs:  CBC:  Recent Labs   Lab 06/15/22  1633 06/16/22  1100   WBC 6.67 4.56   HGB 10.7* 10.8*   HCT 35.0* 36.0*    320     CMP:  Recent Labs   Lab 06/15/22  1633 06/16/22  1100    138   K 3.9 3.7    104   CO2 25 24   GLU 68* 81   BUN 10 9   CREATININE 1.1 0.9   CALCIUM 9.2 9.1   PROT 8.0 7.5   ALBUMIN 2.8* 2.6*   BILITOT 0.4 0.5   ALKPHOS 87 84   AST 12 11   ALT 11 7*   ANIONGAP 10 10   EGFRNONAA >60.0 >60.0     PTINR:  No results for input(s): INR in the last 48 hours.    Significant Procedures:   Dobutamine Stress Test with Color Flow: No results found for this or any previous visit.      Assessment/Plan:      * Osteomyelitis of left lower extremity  -Pt. With acute on chronic osteomyleitis of LLE including heel with purulent foul smelling drainage  -Podiatry consulted, pt. Not spetic appearing. S/p bone biopsy.  -ID consulted. Cultures growing SA and GBS      Peripheral arterial disease  -PVD contributing to current non-healing foot wound. Arterial U/S shows LLE atherosclerotic disease Areas of high-grade  stenosis within the L popliteal artery  -Continue home statin, ASA  -Vascular surgery consulted - patient deciding regarding need for surgery    Asymptomatic Mobitz (type) I (Wenckebach's) atrioventricular block  -Bradycardic in ED but asymptomatic, chronic issue      Chronic kidney disease, stage III (moderate)  -Cr at baseline 1.1, no acute issues  -Continue to monitor    Type 2 diabetes, uncontrolled, with neuropathy  -Last A1c 2020, repeat ordered. Per pt., taking 68 units lantus QHS along with metformin  mg and glipizide 10 mg daily  -Mildly hypoglycemic on admit to 63, moderate dose SSI ordered   -give decreased dose of 15 units levemir, Titrate up as needed    Essential hypertension  Continue losartan 100mg and HCTZ 25 mg, with hydralazine 25 mg PO q8h      VTE Risk Mitigation (From admission, onward)         Ordered     IP VTE HIGH RISK PATIENT  Once         06/15/22 1957     Place sequential compression device  Until discontinued         06/15/22 1957                Discharge Planning   OXANA: 6/20/2022     Code Status: Full Code   Is the patient medically ready for discharge?:     Reason for patient still in hospital (select all that apply): Patient trending condition and Consult recommendations  Discharge Plan A: Home with family                  Ortiz Benitez MD  Department of Hospital Medicine   [facility] - Telemetry Stepdown

## 2022-06-17 NOTE — ASSESSMENT & PLAN NOTE
-Pt. With acute on chronic osteomyleitis of LLE including heel with purulent foul smelling drainage  -Podiatry consulted, pt. Not spetic appearing. S/p bone biopsy.  -ID consulted. Cultures growing SA and GBS

## 2022-06-17 NOTE — PLAN OF CARE
[facility] - Telemetry Stepdown  Initial Discharge Assessment       Primary Care Provider: Mart Escalante MD    Admission Diagnosis: Leg pain [M79.606]  Foot pain, left [M79.672]    Admission Date: 6/15/2022  Expected Discharge Date:          Payor: HU[employer] MANAGED MEDICARE / Plan: HUMANA SNP (SPECIAL NEEDS PLAN) / Product Type: Medicare Advantage /     Extended Emergency Contact Information  Primary Emergency Contact: Aleshia Castañedanda   [place]  Mobile Phone: [phone]  Relation: Sister  Secondary Emergency Contact: January Nicholson   [place]  Mobile Phone: [phone]  Relation: Sister    Discharge Plan A: Home with family  Discharge Plan B: [id number] - [place]  [address] [facility] [address] [place] [address]  Phone: [phone] Fax: [phone]      Initial Assessment (most recent)     Adult Discharge Assessment - 06/17/22 1144        Discharge Assessment    Assessment Type Discharge Planning Assessment     Confirmed/corrected address, phone number and insurance Yes     Confirmed Demographics Correct on Facesheet     Source of Information patient     Communicated OXNAA with patient/caregiver Yes     Reason For Admission Left foot pain     Lives With sibling(s)   Patient lives with his brother and sister.    Do you expect to return to your current living situation? Yes     Do you have help at home or someone to help you manage your care at home? Yes     Who are your caregiver(s) and their phone number(s)? Kandy Castañeda (sister) [phone]     Prior to hospitilization cognitive status: Alert/Oriented     Current cognitive status: Alert/Oriented     Walking or Climbing Stairs Difficulty ambulation difficulty, requires equipment     Dressing/Bathing Difficulty none     Equipment Currently Used at Home walker, rolling;wheelchair     Readmission within 30 days? No     Patient currently  being followed by outpatient case management? No     Do you currently have service(s) that help you manage your care at home? No     Do you take prescription medications? Yes     Do you have prescription coverage? Yes     Do you have any problems affording any of your prescribed medications? No     Is the patient taking medications as prescribed? yes     How do you get to doctors appointments? health plan transportation     Are you on dialysis? No     Do you take coumadin? No     Discharge Plan A Home with family     Discharge Plan B Home Health     DME Needed Upon Discharge  other (see comments)   TBD    Discharge Plan discussed with: Patient               Patient states that he goes to the Podiatry Clinic for wound care.    Barb Brown RN  Ext [phone]

## 2022-06-17 NOTE — CONSULTS
[facility] Medicine  Telemedicine Consult Note    Patient Name: Saji Castañeda  MRN: [id number]  Admission Date: 6/15/2022  Hospital Length of Stay: 2 days  Attending Physician: Ortiz Bob*   Primary Care Provider: Mart Escalante MD     Thank you for your consult to [facility]. We have reviewed the patient chart. This patient does meet criteria for [facility] service at this time. Will assume care on 06/18/22 at 7AM.          Salma Strauss MD  Department of Hospital Medicine   [facility] - [facility]

## 2022-06-17 NOTE — PLAN OF CARE
Problem: Adult Inpatient Plan of Care  Goal: Plan of Care Review  Outcome: Ongoing, Progressing  Goal: Optimal Comfort and Wellbeing  Outcome: Ongoing, Progressing     Problem: Diabetes Comorbidity  Goal: Blood Glucose Level Within Targeted Range  Outcome: Ongoing, Progressing  Intervention: Monitor and Manage Glycemia  Flowsheets (Taken 6/17/2022 0810)  Glycemic Management:   blood glucose monitored   supplemental insulin given     Problem: Skin Injury Risk Increased  Goal: Skin Health and Integrity  Outcome: Ongoing, Progressing     Problem: Impaired Wound Healing  Goal: Optimal Wound Healing  Outcome: Ongoing, Progressing     POC reviewed. AAOX4. Wound care performed. No acute changes. Encourage patient to reposition but refused. Frequent safety checks. Call light and urinal within reach. TM

## 2022-06-17 NOTE — ASSESSMENT & PLAN NOTE
-PVD contributing to current non-healing foot wound. Arterial U/S shows LLE atherosclerotic disease Areas of high-grade stenosis within the L popliteal artery  -Continue home statin, ASA  -Vascular surgery consulted - patient deciding regarding need for surgery

## 2022-06-17 NOTE — HPI
Mr. 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 Pt was seen in podiatry clinic on 6/7 and was found to chronic nonhealing left heal with nonviable tissues, BKA was discussed, but pt was not interested. He followed up one week later on 6/15 where the wound was found to be extremely malodorous, posterior leg ulceration with increased drainage and depth. The left hellp stump lesion was found to have increased exposed bone. Therefore the pt was sent to the ED for further eval and IV abx therapy.     To note, pt has minimal ability to ambulate at baseline, uses a wheelchair for locomotion.     Since admission, pt has remained hypertensive, with an isolated fever of 100.7 today on 6/17, remains on RA. No leukocytosis noted, elevated sed rate (>120), elevated CRP (27), low albumin noted, UA without concerns for infection. Blood cultures on 6/15 NGTD, urine culture NGTD. Wound culture from left foot + streptococcus agalactiae (GBS), staph aureus, sensitives pending. Bone culture from left foot pending.     Left lower leg arterial US was concerning for significant stenosis within the popliteal artery. Mild peripheral arterial disease noted on left/right leg ABIs. CTA significant for severe arthrosclerotic disease of the vasculature, narrowing/occlusions of the calf vessels. Tib/fib xray of the left leg notable for ulcer crater down to bone with osteomyelitis of the posterior calcaneus.    Pt is on cefepime and vancomycin. ID was consulted for suspected osteomyelitis left calcaneus.

## 2022-06-17 NOTE — ASSESSMENT & PLAN NOTE
Assessment: IDSA moderate to severe diabetic foot infection with left calcaneal osteomyelitis and overlying wound, possible significant PAD. Wound culture + for GBS and Staph aureus, bone culture pending. L arterial US + for significant stenoses, L MARIO 1.43.     Plan:  -No surgical intervention planned by podiatry. Other services have recommended major amputation, patient not amenable at this time. Attempting limb salvage with wound care and antibiotics.   -Antibiotic plan per ID.  -Appreciate vascular surgery recs: patient will eventually need major amputation.   - to [facility] for transfers only.   -Dressing changes by nursing.   -Podiatry will follow.

## 2022-06-17 NOTE — CARE UPDATE
"RAPID RESPONSE NURSE CHART REVIEW       Chart Reviewed: 06/17/2022, 2:35 PM    MRN: [id number]  Bed: 8092/8092 A    Dx: Osteomyelitis of left lower extremity    Saji Castañeda has a past medical history of Arthritis, Diabetes mellitus, Diabetes mellitus, type 2, Hyperlipidemia, and Osteomyelitis.    Last VS: /67 (BP Location: Left arm, Patient Position: Lying)   Pulse 71   Temp 99.2 °F (37.3 °C) (Oral)   Resp 18   Ht 5' 9" (1.753 m)   Wt 124.3 kg (274 lb 0.5 oz)   SpO2 (!) 92%   BMI 40.47 kg/m²     24H Vital Sign Range:  Temp:  [97.5 °F (36.4 °C)-100.7 °F (38.2 °C)]   Pulse:  [48-83]   Resp:  [16-20]   BP: (136-187)/(67-78)   SpO2:  [91 %-95 %]     Level of Consciousness (AVPU): alert    Recent Labs     06/15/22  1633 06/16/22  1100   WBC 6.67 4.56   HGB 10.7* 10.8*   HCT 35.0* 36.0*    320       Recent Labs     06/15/22  1633 06/16/22  1100    138   K 3.9 3.7    104   CO2 25 24   CREATININE 1.1 0.9   GLU 68* 81   PHOS 3.2  --    MG 1.7  --         No results for input(s): PH, PCO2, PO2, HCO3, POCSATURATED, BE in the last 72 hours.     OXYGEN:        O2 Device (Oxygen Therapy): room air    MEWS score: 1    charge RN, Maria Teresa contacted. No concerns verbalized at this time. Instructed to call [phone] for further concerns or assistance.    Rosalio Granados RN        "

## 2022-06-17 NOTE — SUBJECTIVE & OBJECTIVE
Subjective:     Interval History: Tmax 100.7 F, other vitals stable. No new pedal complaints. Dressings clean dry, intact.       Scheduled Meds:   aluminum-magnesium hydroxide-simethicone  30 mL Oral QID (AC & HS)    aspirin  81 mg Oral Daily    atorvastatin  80 mg Oral Daily    ceFEPime (MAXIPIME) IVPB  2 g Intravenous Q8H    gabapentin  200 mg Oral BID    hydrALAZINE  25 mg Oral Q8H    insulin detemir U-100  15 Units Subcutaneous QHS    losartan-hydrochlorothiazide 100-25 mg  1 tablet Oral Daily    sucralfate  1 g Oral Q6H    tamsulosin  0.8 mg Oral Daily    vancomycin (VANCOCIN) IVPB  1,750 mg Intravenous Q12H     Continuous Infusions:  PRN Meds:acetaminophen, albuterol-ipratropium, dextrose 10%, dextrose 10%, glucagon (human recombinant), glucose, glucose, hydrALAZINE, insulin aspart U-100, melatonin, naloxone, ondansetron, oxyCODONE-acetaminophen, prochlorperazine, sodium chloride 0.9%    Review of Systems   Constitutional:  Negative for activity change, chills, fever and unexpected weight change.   HENT:  Negative for congestion and sore throat.    Respiratory:  Negative for cough, shortness of breath and wheezing.    Cardiovascular:  Negative for chest pain, palpitations and leg swelling.   Gastrointestinal:  Negative for abdominal pain, blood in stool, nausea and vomiting.   Genitourinary:  Negative for dysuria and hematuria.   Musculoskeletal:  Positive for gait problem. Negative for arthralgias and neck pain.   Skin:  Positive for wound. Negative for color change and rash.   Neurological:  Positive for numbness. Negative for dizziness and seizures.   Psychiatric/Behavioral:  Negative for hallucinations and suicidal ideas.    All other systems reviewed and are negative.  Objective:     Vital Signs (Most Recent):  Temp: 98.9 °F (37.2 °C) (06/17/22 1504)  Pulse: 75 (06/17/22 1504)  Resp: 20 (06/17/22 1504)  BP: (!) 141/64 (06/17/22 1504)  SpO2: 96 % (06/17/22 1637)   Vital Signs (24h Range):  Temp:  [97.9  °F (36.6 °C)-100.7 °F (38.2 °C)] 98.9 °F (37.2 °C)  Pulse:  [48-79] 75  Resp:  [16-20] 20  SpO2:  [91 %-96 %] 96 %  BP: (136-187)/(61-78) 141/64     Weight: 124.3 kg (274 lb 0.5 oz)  Body mass index is 40.47 kg/m².    Foot Exam    General  Orientation: alert and oriented to person, place, and time   Affect: appropriate       Right Foot/Ankle     Inspection and Palpation  Ecchymosis: none  Tenderness: none   Swelling: (BLE edema)  Skin Exam: skin intact;     Neurovascular  Dorsalis pedis: doppler  Posterior tibial: doppler  Saphenous nerve sensation: diminished  Tibial nerve sensation: diminished  Superficial peroneal nerve sensation: diminished  Deep peroneal nerve sensation: diminished  Sural nerve sensation: diminished      Left Foot/Ankle      Inspection and Palpation  Ecchymosis: none  Tenderness: (Tenderness to the posterior heel wound)  Swelling: (significant b/l LE edema)  Skin Exam: drainage, skin changes and ulcer;     Neurovascular  Dorsalis pedis: doppler  Posterior tibial: doppler  Saphenous nerve sensation: diminished  Tibial nerve sensation: diminished  Superficial peroneal nerve sensation: diminished  Deep peroneal nerve sensation: diminished  Sural nerve sensation: diminished    Comments  Chopart amputation    Laboratory:  Blood Cultures: No results for input(s): LABBLOO in the last 48 hours.  CBC:   Recent Labs   Lab 06/16/22  1100   WBC 4.56   RBC 4.26*   HGB 10.8*   HCT 36.0*      MCV 85   MCH 25.4*   MCHC 30.0*     CMP:   Recent Labs   Lab 06/16/22  1100   GLU 81   CALCIUM 9.1   ALBUMIN 2.6*   PROT 7.5      K 3.7   CO2 24      BUN 9   CREATININE 0.9   ALKPHOS 84   ALT 7*   AST 11   BILITOT 0.5     CRP:   Recent Labs   Lab 06/15/22  1633   CRP 26.7*     ESR:   Recent Labs   Lab 06/15/22  1633   SEDRATE >120*     Microbiology Results (last 7 days)       Procedure Component Value Units Date/Time    Aerobic culture [[id number]]  (Abnormal) Collected: 06/16/22 1305    Order Status:  Completed Specimen: Wound from Foot, Left Updated: 06/17/22 1030     Aerobic Bacterial Culture STREPTOCOCCUS AGALACTIAE (GROUP B)  Moderate  Beta-hemolytic streptococci are routinely susceptible to   penicillins,cephalosporins and carbapenems.  Susceptibility testing not routinely performed        STAPHYLOCOCCUS AUREUS  Moderate  Susceptibility pending      Culture, Anaerobe [[id number]] Collected: 06/16/22 1308    Order Status: Completed Specimen: Bone from Foot, Left Updated: 06/17/22 0721     Anaerobic Culture Culture in progress    Culture, Anaerobe [[id number]] Collected: 06/16/22 1305    Order Status: Completed Specimen: Wound from Foot, Left Updated: 06/17/22 0721     Anaerobic Culture Culture in progress    Aerobic culture [[id number]] Collected: 06/16/22 1308    Order Status: Completed Specimen: Bone from Foot, Left Updated: 06/17/22 0700     Aerobic Bacterial Culture No growth    Urine culture [[id number]] Collected: 06/15/22 1817    Order Status: Completed Specimen: Urine Updated: 06/16/22 2030     Urine Culture, Routine No growth    Narrative:      Specimen Source->Urine    Gram stain [[id number]] Collected: 06/16/22 1308    Order Status: Completed Specimen: Bone from Foot, Left Updated: 06/16/22 1813     Gram Stain Result No WBC's      No organisms seen    Blood culture x two cultures. Draw prior to antibiotics. [[id number]] Collected: 06/15/22 1634    Order Status: Completed Specimen: Blood from Peripheral, Antecubital, Left Updated: 06/16/22 1812     Blood Culture, Routine No Growth to date      No Growth to date    Narrative:      Aerobic and anaerobic    Blood culture x two cultures. Draw prior to antibiotics. [[id number]] Collected: 06/15/22 1633    Order Status: Completed Specimen: Blood from Peripheral, Antecubital, Right Updated: 06/16/22 1812     Blood Culture, Routine No Growth to date      No Growth to date    Narrative:      Aerobic and anaerobic    Gram stain [[id number]] Collected: 06/16/22 1305     Order Status: Completed Specimen: Wound from Foot, Left Updated: 06/16/22 6518     Gram Stain Result Rare WBC's      Rare Gram positive cocci          Specimen (24h ago, onward)                None            Clinical Findings:  -Left posterior heel wound down to subcutaneous tissue with thin soft tissue covering overlying calcaneus. Wound bed viable. Serous drainage. Mild tenderness. Periwound maceration improved. Periwound callus.

## 2022-06-17 NOTE — SUBJECTIVE & OBJECTIVE
"Past Medical History:   Diagnosis Date    Arthritis     legs    Diabetes mellitus     Diabetes mellitus, type 2     Hyperlipidemia     Osteomyelitis        Past Surgical History:   Procedure Laterality Date    ANGIOGRAPHY OF LOWER EXTREMITY N/A 2/3/2021    Procedure: Angiogram Extremity Bilateral;  Surgeon: Ernst Chacko MD;  Location: [facility] OR [address] [place] [address];  Service: Peripheral Vascular;  Laterality: N/A;  7.4 mintues fluoroscopy time  816.15 mGy  170.17 Gycm2    AORTOGRAPHY WITH EXTREMITY RUNOFF Bilateral 2/3/2021    Procedure: AORTOGRAM, WITH EXTREMITY RUNOFF;  Surgeon: Ernst Chacko MD;  Location: [facility] OR [address] [place] [address];  Service: Peripheral Vascular;  Laterality: Bilateral;    DEBRIDEMENT OF FOOT Left 2/23/2021    Procedure: DEBRIDEMENT, LEFT HEEL;  Surgeon: Mayra Schroeder DPM;  Location: [facility] OR [address] [place] [address];  Service: Podiatry;  Laterality: Left;    FOOT AMPUTATION  October 2010    left high midfoot amputation       Review of patient's allergies indicates:  No Known Allergies    Medications:  Medications Prior to Admission   Medication Sig    acetaminophen (TYLENOL) 325 MG tablet Take 2 tablets (650 mg total) by mouth every 6 (six) hours as needed for Pain.    aspirin (ECOTRIN) 81 MG EC tablet Take 81 mg by mouth once daily.    BD ULTRA-FINE ADITYA PEN NEEDLE 32 gauge x 5/32" Ndle USE FOUR TIMES DAILY WITH MEALS AND NIGHTLY    blood sugar diagnostic (CONTOUR TEST STRIPS) Strp Inject 1 strip into the skin 2 (two) times daily before meals.    ELIQUIS 5 mg Tab     gabapentin (NEURONTIN) 100 MG capsule Take 2 capsules (200 mg total) by mouth 2 (two) times daily.    glipiZIDE (GLUCOTROL) 10 MG tablet Take 10 mg by mouth daily with breakfast. HOLD THIS MEDICATION IF YOU ARE SKIPPING A MEAL    hydroCHLOROthiazide (HYDRODIURIL) 25 MG tablet     insulin aspart U-100 (NOVOLOG) 100 unit/mL (3 mL) InPn pen Inject 0-5 Units into the skin before meals and at bedtime as needed (Hyperglycemia).    insulin detemir U-100 " (LEVEMIR FLEXTOUCH) 100 unit/mL (3 mL) SubQ InPn pen Inject 55 Units into the skin every evening.    isosorbide-hydrALAZINE 20-37.5 mg (BIDIL) 20-37.5 mg Tab Take 1 tablet by mouth 3 (three) times daily.    ketoconazole (NIZORAL) 2 % cream Apply topically once daily.    LANTUS SOLOSTAR U-100 INSULIN glargine 100 units/mL (3mL) SubQ pen 68 Units every evening.    losartan (COZAAR) 100 MG tablet     losartan-hydrochlorothiazide 100-25 mg (HYZAAR) 100-25 mg per tablet Take 1 tablet by mouth once daily.    melatonin (MELATIN) 3 mg tablet Take 2 tablets (6 mg total) by mouth nightly as needed for Insomnia.    metFORMIN (GLUCOPHAGE-XR) 500 MG ER 24hr tablet     MICROLET LANCET Misc     multivitamin Tab Take 1 tablet by mouth once daily.    multivitamin with minerals tablet 1 tablet 2 TIMES DAILY (route: oral)    pantoprazole (PROTONIX) 40 MG tablet Take 40 mg by mouth once daily.    polyethylene glycol (GLYCOLAX) 17 gram PwPk Take 17 g by mouth once daily.    rosuvastatin (CRESTOR) 40 MG Tab Take 40 mg by mouth once daily.    senna (SENOKOT) 8.6 mg tablet Take 1 tablet by mouth 2 (two) times daily.    tamsulosin (FLOMAX) 0.4 mg Cap Take 2 capsules (0.8 mg total) by mouth once daily.    triamcinolone acetonide 0.1% (KENALOG) 0.1 % cream Apply topically 2 (two) times daily. Apply to affected area twice daily as directed.    VITAMIN C WITH CARLOS HIPS 500 MG tablet Take 500 mg by mouth 2 (two) times daily.    VITAMIN D2 1,250 mcg (50,000 unit) capsule Take 50,000 Units by mouth every 7 days.    zinc sulfate (ZINCATE) 220 (50) mg capsule Take 1 capsule (220 mg total) by mouth once daily.     Antibiotics (From admission, onward)                Start     Stop Route Frequency Ordered    06/17/22 0530  vancomycin 1.75 g in 5 % dextrose 500 mL IVPB         -- IV Every 12 hours (non-standard times) 06/16/22 1632    06/16/22 1730  cefepime in dextrose 5 % IVPB 2 g         -- IV Every 8 hours (non-standard times) 06/16/22 4604           Antifungals (From admission, onward)                None          Antivirals (From admission, onward)      None             Immunization History   Administered Date(s) Administered    Influenza - Trivalent (ADULT) 10/08/2020    PPD Test 12/09/2020, 03/02/2021    Pneumococcal Conjugate - 13 Valent 10/16/2018       Family History       Problem Relation (Age of Onset)    Cancer Brother    Diabetes Mother, Sister    Heart disease Mother    Stroke Sister          Social History     Socioeconomic History    Marital status: Single   Tobacco Use    Smoking status: Never Smoker    Smokeless tobacco: Never Used   Substance and Sexual Activity    Alcohol use: No     Comment: occassional    Drug use: No    Sexual activity: Not Currently   Social History Narrative    Not currently working; lives with family     Review of Systems   Constitutional:  Negative for activity change, chills, diaphoresis, fatigue and fever.   Respiratory:  Negative for cough, chest tightness and shortness of breath.    Cardiovascular:  Negative for chest pain, palpitations and leg swelling.   Gastrointestinal:  Negative for abdominal distention, abdominal pain, constipation, diarrhea and vomiting.   Genitourinary:  Negative for difficulty urinating.   Musculoskeletal:  Positive for gait problem. Negative for arthralgias, joint swelling and myalgias.   Skin:  Negative for color change and wound.   Neurological:  Negative for dizziness and headaches.   Psychiatric/Behavioral:  Negative for agitation and confusion. The patient is not nervous/anxious.    Objective:     Vital Signs (Most Recent):  Temp: 99.2 °F (37.3 °C) (06/17/22 1128)  Pulse: 70 (06/17/22 1128)  Resp: 18 (06/17/22 1128)  BP: 136/67 (06/17/22 1128)  SpO2: (!) 92 % (06/17/22 1237)   Vital Signs (24h Range):  Temp:  [97.5 °F (36.4 °C)-100.7 °F (38.2 °C)] 99.2 °F (37.3 °C)  Pulse:  [48-83] 70  Resp:  [16-20] 18  SpO2:  [91 %-95 %] 92 %  BP: (136-187)/(67-78) 136/67     Weight: 124.3 kg  (274 lb 0.5 oz)  Body mass index is 40.47 kg/m².    Estimated Creatinine Clearance: 95.2 mL/min (based on SCr of 0.9 mg/dL).    Physical Exam  Constitutional:       General: He is not in acute distress.     Appearance: Normal appearance. He is normal weight. He is not ill-appearing, toxic-appearing or diaphoretic.   HENT:      Head: Normocephalic.      Nose: Nose normal.      Mouth/Throat:      Mouth: Mucous membranes are moist.      Pharynx: Oropharynx is clear.   Eyes:      Conjunctiva/sclera: Conjunctivae normal.   Cardiovascular:      Rate and Rhythm: Normal rate and regular rhythm.      Pulses: Normal pulses.      Heart sounds: Normal heart sounds. No murmur heard.  Pulmonary:      Effort: Pulmonary effort is normal. No respiratory distress.      Breath sounds: Normal breath sounds.   Abdominal:      General: Abdomen is flat. Bowel sounds are normal. There is no distension.      Palpations: Abdomen is soft.   Musculoskeletal:         General: Swelling and deformity (L foot amputation) present. Normal range of motion.      Cervical back: Normal range of motion.      Right lower leg: Edema present.      Left lower leg: Edema present.   Skin:     General: Skin is warm and dry.   Neurological:      General: No focal deficit present.      Mental Status: He is alert and oriented to person, place, and time. Mental status is at baseline.      Motor: No weakness.      Gait: Gait abnormal.   Psychiatric:         Mood and Affect: Mood normal.         Behavior: Behavior normal.         Thought Content: Thought content normal.         Judgment: Judgment normal.       Significant Labs: All pertinent labs within the past 24 hours have been reviewed.    Significant Imaging: I have reviewed all pertinent imaging results/findings within the past 24 hours.

## 2022-06-17 NOTE — SUBJECTIVE & OBJECTIVE
Interval History: Culture positive GBS and SA. Awaiting decision regarding surgery.     Review of Systems  Objective:     Vital Signs (Most Recent):  Temp: 99.2 °F (37.3 °C) (06/17/22 1128)  Pulse: 70 (06/17/22 1128)  Resp: 18 (06/17/22 1128)  BP: 136/67 (06/17/22 1128)  SpO2: (!) 92 % (06/17/22 1237)   Vital Signs (24h Range):  Temp:  [97.5 °F (36.4 °C)-100.7 °F (38.2 °C)] 99.2 °F (37.3 °C)  Pulse:  [48-83] 70  Resp:  [16-20] 18  SpO2:  [91 %-95 %] 92 %  BP: (136-187)/(67-78) 136/67     Weight: 124.3 kg (274 lb 0.5 oz)  Body mass index is 40.47 kg/m².    Intake/Output Summary (Last 24 hours) at 6/17/2022 1337  Last data filed at 6/17/2022 1254  Gross per 24 hour   Intake 600 ml   Output 1700 ml   Net -1100 ml      Physical Exam  Constitutional:       General: He is not in acute distress.     Appearance: Normal appearance. He is not toxic-appearing or diaphoretic.   Cardiovascular:      Rate and Rhythm: Regular rhythm.   Pulmonary:      Effort: Pulmonary effort is normal. No respiratory distress.      Breath sounds: No wheezing.   Abdominal:      General: Abdomen is flat. There is no distension.      Palpations: Abdomen is soft.      Tenderness: There is no abdominal tenderness. There is no guarding or rebound.   Musculoskeletal:         General: Deformity present.      Cervical back: Normal range of motion and neck supple.   Skin:     General: Skin is warm and dry.   Neurological:      Mental Status: He is alert.       Computed MELD-Na score unavailable. Necessary lab results were not found in the last year.  Computed MELD score unavailable. Necessary lab results were not found in the last year.    Significant Labs:  CBC:  Recent Labs   Lab 06/15/22  1633 06/16/22  1100   WBC 6.67 4.56   HGB 10.7* 10.8*   HCT 35.0* 36.0*    320     CMP:  Recent Labs   Lab 06/15/22  1633 06/16/22  1100    138   K 3.9 3.7    104   CO2 25 24   GLU 68* 81   BUN 10 9   CREATININE 1.1 0.9   CALCIUM 9.2 9.1   PROT 8.0  7.5   ALBUMIN 2.8* 2.6*   BILITOT 0.4 0.5   ALKPHOS 87 84   AST 12 11   ALT 11 7*   ANIONGAP 10 10   EGFRNONAA >60.0 >60.0     PTINR:  No results for input(s): INR in the last 48 hours.    Significant Procedures:   Dobutamine Stress Test with Color Flow: No results found for this or any previous visit.

## 2022-06-17 NOTE — PROGRESS NOTES
"[facility] - Telemetry Stepdown  Vascular Surgery  Progress Note    Patient Name: Saji Castañeda  MRN: [id number]  Admission Date: 6/15/2022  Primary Care Provider: Serge Holland MD    Subjective:     Interval History:   No acute events overnight  Bone biopsy with podiatry     Post-Op Info:  * No surgery found *         Medications Prior to Admission   Medication Sig Dispense Refill Last Dose    acetaminophen (TYLENOL) 325 MG tablet Take 2 tablets (650 mg total) by mouth every 6 (six) hours as needed for Pain.  0     aspirin (ECOTRIN) 81 MG EC tablet Take 81 mg by mouth once daily.       BD ULTRA-FINE ADITYA PEN NEEDLE 32 gauge x 5/32" Ndle USE FOUR TIMES DAILY WITH MEALS AND NIGHTLY 100 each 0     blood sugar diagnostic (CONTOUR TEST STRIPS) Strp Inject 1 strip into the skin 2 (two) times daily before meals. 100 strip 6     ELIQUIS 5 mg Tab        gabapentin (NEURONTIN) 100 MG capsule Take 2 capsules (200 mg total) by mouth 2 (two) times daily. 120 capsule 1     glipiZIDE (GLUCOTROL) 10 MG tablet Take 10 mg by mouth daily with breakfast. HOLD THIS MEDICATION IF YOU ARE SKIPPING A MEAL       hydroCHLOROthiazide (HYDRODIURIL) 25 MG tablet        insulin aspart U-100 (NOVOLOG) 100 unit/mL (3 mL) InPn pen Inject 0-5 Units into the skin before meals and at bedtime as needed (Hyperglycemia).  0     insulin detemir U-100 (LEVEMIR FLEXTOUCH) 100 unit/mL (3 mL) SubQ InPn pen Inject 55 Units into the skin every evening.  0     isosorbide-hydrALAZINE 20-37.5 mg (BIDIL) 20-37.5 mg Tab Take 1 tablet by mouth 3 (three) times daily. 90 tablet 2     ketoconazole (NIZORAL) 2 % cream Apply topically once daily. 60 g 1     LANTUS SOLOSTAR U-100 INSULIN glargine 100 units/mL (3mL) SubQ pen 68 Units every evening.       losartan (COZAAR) 100 MG tablet        losartan-hydrochlorothiazide 100-25 mg (HYZAAR) 100-25 mg per tablet Take 1 tablet by mouth once daily.       melatonin (MELATIN) 3 mg tablet Take 2 tablets (6 mg " total) by mouth nightly as needed for Insomnia.  0     metFORMIN (GLUCOPHAGE-XR) 500 MG ER 24hr tablet        MICROLET LANCET Misc   0     multivitamin Tab Take 1 tablet by mouth once daily.       multivitamin with minerals tablet 1 tablet 2 TIMES DAILY (route: oral)       pantoprazole (PROTONIX) 40 MG tablet Take 40 mg by mouth once daily.       polyethylene glycol (GLYCOLAX) 17 gram PwPk Take 17 g by mouth once daily.  0     rosuvastatin (CRESTOR) 40 MG Tab Take 40 mg by mouth once daily.       senna (SENOKOT) 8.6 mg tablet Take 1 tablet by mouth 2 (two) times daily.       tamsulosin (FLOMAX) 0.4 mg Cap Take 2 capsules (0.8 mg total) by mouth once daily. 90 capsule 3     triamcinolone acetonide 0.1% (KENALOG) 0.1 % cream Apply topically 2 (two) times daily. Apply to affected area twice daily as directed. 453.6 g 0     VITAMIN C WITH CARLOS HIPS 500 MG tablet Take 500 mg by mouth 2 (two) times daily.       VITAMIN D2 1,250 mcg (50,000 unit) capsule Take 50,000 Units by mouth every 7 days.       zinc sulfate (ZINCATE) 220 (50) mg capsule Take 1 capsule (220 mg total) by mouth once daily.          Review of patient's allergies indicates:  No Known Allergies    Past Medical History:   Diagnosis Date    Arthritis     legs    Diabetes mellitus     Diabetes mellitus, type 2     Hyperlipidemia     Osteomyelitis      Past Surgical History:   Procedure Laterality Date    ANGIOGRAPHY OF LOWER EXTREMITY N/A 2/3/2021    Procedure: Angiogram Extremity Bilateral;  Surgeon: Ernst Chacko MD;  Location: [address];  Service: Peripheral Vascular;  Laterality: N/A;  7.4 mintues fluoroscopy time  816.15 mGy  170.17 Gycm2    AORTOGRAPHY WITH EXTREMITY RUNOFF Bilateral 2/3/2021    Procedure: AORTOGRAM, WITH EXTREMITY RUNOFF;  Surgeon: Ernst Chakco MD;  Location: [facility] OR [address] [place] [address];  Service: Peripheral Vascular;  Laterality: Bilateral;    DEBRIDEMENT OF FOOT Left 2/23/2021    Procedure: DEBRIDEMENT,  LEFT HEEL;  Surgeon: Mayra Schroeder DPM;  Location: [address];  Service: Podiatry;  Laterality: Left;    FOOT AMPUTATION  October 2010    left high midfoot amputation     Family History       Problem Relation (Age of Onset)    Cancer Brother    Diabetes Mother, Sister    Heart disease Mother    Stroke Sister          Tobacco Use    Smoking status: Never Smoker    Smokeless tobacco: Never Used   Substance and Sexual Activity    Alcohol use: No     Comment: occassional    Drug use: No    Sexual activity: Not Currently     Review of Systems   All other systems reviewed and are negative.  Objective:     Vital Signs (Most Recent):  Temp: 98.2 °F (36.8 °C) (06/16/22 0341)  Pulse: 64 (06/16/22 0739)  Resp: 14 (06/16/22 0341)  BP: (!) 221/90 (06/16/22 0807)  SpO2: 98 % (06/16/22 0341)   Vital Signs (24h Range):  Temp:  [98.2 °F (36.8 °C)-99 °F (37.2 °C)] 98.2 °F (36.8 °C)  Pulse:  [40-68] 64  Resp:  [14-19] 14  SpO2:  [95 %-98 %] 98 %  BP: (135-229)/() 221/90     Weight: 124.3 kg (274 lb 0.5 oz)  Body mass index is 40.47 kg/m².    Physical Exam  Vitals reviewed.   Constitutional:       General: He is not in acute distress.     Appearance: He is well-developed.   HENT:      Head: Normocephalic and atraumatic.   Eyes:      Extraocular Movements: Extraocular movements intact.      Pupils: Pupils are equal, round, and reactive to light.   Neck:      Vascular: No JVD.      Trachea: No tracheal deviation.   Cardiovascular:      Rate and Rhythm: Regular rhythm. Bradycardia present.      Pulses:           Dorsalis pedis pulses are 0 on the right side and 0 on the left side.      Heart sounds: No murmur heard.    No friction rub. No gallop.   Pulmonary:      Effort: No respiratory distress.      Breath sounds: Normal breath sounds. No wheezing or rales.   Abdominal:      General: Bowel sounds are normal. There is no distension.      Palpations: Abdomen is soft. There is no mass.      Tenderness: There is  no abdominal tenderness.   Musculoskeletal:         General: No deformity.      Cervical back: Neck supple.      Comments: LLE BKA, RLE with extensive foot swelling and open wound to heal with purulent drainage   Lymphadenopathy:      Cervical: No cervical adenopathy.   Skin:     General: Skin is warm and dry.      Findings: No rash.   Neurological:      Mental Status: He is alert and oriented to person, place, and time.           Significant Labs:  All pertinent labs from the last 24 hours have been reviewed.    Significant Diagnostics:  I have reviewed all pertinent imaging results/findings within the past 24 hours.    Assessment/Plan:     Peripheral arterial disease  Saji Castañeda is a 70 y.o. male who has a past medical history of Arthritis, Diabetes mellitus, Diabetes mellitus, type 2, Hyperlipidemia, and Osteomyelitis, chronic lymphedema with chronic bilateral lower extremity wounds who presents with worsening and nonhealing left lower extremity wounds:     -- Angiogram from 2/2021: Tortuous but non-diseased aortoiliac system, Left Femoral popliteal segments without disease, Left tibial vessels without disease, 3 vessel runoff to foot, R femoral popliteal segments without disease  , and R 2 vessel runoff to foot.  -- S/p bone biopsy with podiatry   -- Patient will eventually need a BKA vs AKA based on imaging and physical exam   -- He will make a decision in the following days         Jonathan Hayden MD  Vascular Surgery  [facility] - Tele[facility] Stepdown

## 2022-06-17 NOTE — CONSULTS
[facility] - Tele[facility] Stepdown  Infectious Disease  Consult Note    Patient Name: Saji Castañeda  MRN: [id number]  Admission Date: 6/15/2022  Hospital Length of Stay: 2 days  Attending Physician: Ortiz Bob*  Primary Care Provider: Mart Escalante MD     Isolation Status: No active isolations    Patient information was obtained from patient, past medical records and ER records.      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 Wound was found to be malodorous, ulcerated, increased drainage and depth, and exposed bone. See HPI for further detail.     6/15- blood culture- pending  6/15- L wound culture- + GBS/S. Aureus, pending.   6/15- L bone culture- pending     Isolated fever at 100.7, no leukocytosis noted. Vascular studies concerning for left lower leg arterial stenosis and calf vessel narrowing/occulsions. Tib/fib xray of the left leg notable for ulcer crater down to bone with osteomyelitis of the posterior calcaneus.    Pt is on cefepime and vancomycin. ID was consulted for suspected osteomyelitis left calcaneus.     Recommendations:  - Continue Cefepime and Vancomycin. Inpatient pharmacy to manage vanco dosing.   - Follow culture data, will tailor abxs accordingly.   - Follow vascular/podiatry surgical plans.   - Pt reviewed and seen per ID staff, Dr. Leyva. ID will follow.     Foot pain, left  See A/P above    Peripheral arterial disease  See AP above          Thank you for your consult. I will follow-up with patient. Please contact us if you have any additional questions.    Dolly Gastelum NP  Infectious  [facility] - Telemetry Stepdown    Subjective:     Principal Problem: Osteomyelitis of left lower extremity    HPI: Mr. 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 Pt was seen in podiatry clinic on 6/7 and was found to chronic nonhealing left heal with nonviable tissues, BKA was discussed, but pt was not interested. He followed up one week later on 6/15 where the wound was found to be extremely malodorous, posterior leg ulceration with increased drainage and depth. The left hellp stump lesion was found to have increased exposed bone. Therefore the pt was sent to the ED for further eval and IV abx therapy.     To note, pt has minimal ability to ambulate at baseline, uses a wheelchair for locomotion.     Since admission, pt has remained hypertensive, with an isolated fever of 100.7 today on 6/17, remains on RA. No leukocytosis noted, elevated sed rate (>120), elevated CRP (27), low albumin noted, UA without concerns for infection. Blood cultures on 6/15 NGTD, urine culture NGTD. Wound culture from left foot + streptococcus agalactiae (GBS), staph aureus, sensitives pending. Bone culture from left foot pending.     Left lower leg arterial US was concerning for significant stenosis within the popliteal artery. Mild peripheral arterial disease noted on left/right leg ABIs. CTA significant for severe arthrosclerotic disease of the vasculature, narrowing/occlusions of the calf vessels. Tib/fib xray of the left leg notable for ulcer crater down to bone with osteomyelitis of the posterior calcaneus.    Pt is on cefepime and vancomycin. ID was consulted for suspected osteomyelitis left calcaneus.             Past Medical History:   Diagnosis Date    Arthritis     legs    Diabetes mellitus     Diabetes  "mellitus, type 2     Hyperlipidemia     Osteomyelitis        Past Surgical History:   Procedure Laterality Date    ANGIOGRAPHY OF LOWER EXTREMITY N/A 2/3/2021    Procedure: Angiogram Extremity Bilateral;  Surgeon: Ernst Chacko MD;  Location: [facility] OR [address] [place] [address];  Service: Peripheral Vascular;  Laterality: N/A;  7.4 mintues fluoroscopy time  816.15 mGy  170.17 Gycm2    AORTOGRAPHY WITH EXTREMITY RUNOFF Bilateral 2/3/2021    Procedure: AORTOGRAM, WITH EXTREMITY RUNOFF;  Surgeon: Ernst Chacko MD;  [facility];  Service: Peripheral Vascular;  Laterality: Bilateral;    DEBRIDEMENT OF FOOT Left 2/23/2021    Procedure: DEBRIDEMENT, LEFT HEEL;  Surgeon: Mayra Schroeder DPM;  Location: [facility] OR [address] [place] [address];  Service: Podiatry;  Laterality: Left;    FOOT AMPUTATION  October 2010    left high midfoot amputation       Review of patient's allergies indicates:  No Known Allergies    Medications:  Medications Prior to Admission   Medication Sig    acetaminophen (TYLENOL) 325 MG tablet Take 2 tablets (650 mg total) by mouth every 6 (six) hours as needed for Pain.    aspirin (ECOTRIN) 81 MG EC tablet Take 81 mg by mouth once daily.    BD ULTRA-FINE ADITYA PEN NEEDLE 32 gauge x 5/32" Ndle USE FOUR TIMES DAILY WITH MEALS AND NIGHTLY    blood sugar diagnostic (CONTOUR TEST STRIPS) Strp Inject 1 strip into the skin 2 (two) times daily before meals.    ELIQUIS 5 mg Tab     gabapentin (NEURONTIN) 100 MG capsule Take 2 capsules (200 mg total) by mouth 2 (two) times daily.    glipiZIDE (GLUCOTROL) 10 MG tablet Take 10 mg by mouth daily with breakfast. HOLD THIS MEDICATION IF YOU ARE SKIPPING A MEAL    hydroCHLOROthiazide (HYDRODIURIL) 25 MG tablet     insulin aspart U-100 (NOVOLOG) 100 unit/mL (3 mL) InPn pen Inject 0-5 Units into the skin before meals and at bedtime as needed (Hyperglycemia).    insulin detemir U-100 (LEVEMIR FLEXTOUCH) 100 unit/mL (3 mL) SubQ InPn pen Inject 55 Units into the " skin every evening.    isosorbide-hydrALAZINE 20-37.5 mg (BIDIL) 20-37.5 mg Tab Take 1 tablet by mouth 3 (three) times daily.    ketoconazole (NIZORAL) 2 % cream Apply topically once daily.    LANTUS SOLOSTAR U-100 INSULIN glargine 100 units/mL (3mL) SubQ pen 68 Units every evening.    losartan (COZAAR) 100 MG tablet     losartan-hydrochlorothiazide 100-25 mg (HYZAAR) 100-25 mg per tablet Take 1 tablet by mouth once daily.    melatonin (MELATIN) 3 mg tablet Take 2 tablets (6 mg total) by mouth nightly as needed for Insomnia.    metFORMIN (GLUCOPHAGE-XR) 500 MG ER 24hr tablet     MICROLET LANCET Misc     multivitamin Tab Take 1 tablet by mouth once daily.    multivitamin with minerals tablet 1 tablet 2 TIMES DAILY (route: oral)    pantoprazole (PROTONIX) 40 MG tablet Take 40 mg by mouth once daily.    polyethylene glycol (GLYCOLAX) 17 gram PwPk Take 17 g by mouth once daily.    rosuvastatin (CRESTOR) 40 MG Tab Take 40 mg by mouth once daily.    senna (SENOKOT) 8.6 mg tablet Take 1 tablet by mouth 2 (two) times daily.    tamsulosin (FLOMAX) 0.4 mg Cap Take 2 capsules (0.8 mg total) by mouth once daily.    triamcinolone acetonide 0.1% (KENALOG) 0.1 % cream Apply topically 2 (two) times daily. Apply to affected area twice daily as directed.    VITAMIN C WITH CARLOS HIPS 500 MG tablet Take 500 mg by mouth 2 (two) times daily.    VITAMIN D2 1,250 mcg (50,000 unit) capsule Take 50,000 Units by mouth every 7 days.    zinc sulfate (ZINCATE) 220 (50) mg capsule Take 1 capsule (220 mg total) by mouth once daily.     Antibiotics (From admission, onward)                Start     Stop Route Frequency Ordered    06/17/22 0530  vancomycin 1.75 g in 5 % dextrose 500 mL IVPB         -- IV Every 12 hours (non-standard times) 06/16/22 1632    06/16/22 1730  cefepime in dextrose 5 % IVPB 2 g         -- IV Every 8 hours (non-standard times) 06/16/22 1617          Antifungals (From admission, onward)                None           Antivirals (From admission, onward)      None             Immunization History   Administered Date(s) Administered    Influenza - Trivalent (ADULT) 10/08/2020    PPD Test 12/09/2020, 03/02/2021    Pneumococcal Conjugate - 13 Valent 10/16/2018       Family History       Problem Relation (Age of Onset)    Cancer Brother    Diabetes Mother, Sister    Heart disease Mother    Stroke Sister          Social History     Socioeconomic History    Marital status: Single   Tobacco Use    Smoking status: Never Smoker    Smokeless tobacco: Never Used   Substance and Sexual Activity    Alcohol use: No     Comment: occassional    Drug use: No    Sexual activity: Not Currently   Social History Narrative    Not currently working; lives with family     Review of Systems   Constitutional:  Negative for activity change, chills, diaphoresis, fatigue and fever.   Respiratory:  Negative for cough, chest tightness and shortness of breath.    Cardiovascular:  Negative for chest pain, palpitations and leg swelling.   Gastrointestinal:  Negative for abdominal distention, abdominal pain, constipation, diarrhea and vomiting.   Genitourinary:  Negative for difficulty urinating.   Musculoskeletal:  Positive for gait problem. Negative for arthralgias, joint swelling and myalgias.   Skin:  Negative for color change and wound.   Neurological:  Negative for dizziness and headaches.   Psychiatric/Behavioral:  Negative for agitation and confusion. The patient is not nervous/anxious.    Objective:     Vital Signs (Most Recent):  Temp: 99.2 °F (37.3 °C) (06/17/22 1128)  Pulse: 70 (06/17/22 1128)  Resp: 18 (06/17/22 1128)  BP: 136/67 (06/17/22 1128)  SpO2: (!) 92 % (06/17/22 1237)   Vital Signs (24h Range):  Temp:  [97.5 °F (36.4 °C)-100.7 °F (38.2 °C)] 99.2 °F (37.3 °C)  Pulse:  [48-83] 70  Resp:  [16-20] 18  SpO2:  [91 %-95 %] 92 %  BP: (136-187)/(67-78) 136/67     Weight: 124.3 kg (274 lb 0.5 oz)  Body mass index is 40.47  4 kg/m².    Estimated Creatinine Clearance: 95.2 mL/min (based on SCr of 0.9 mg/dL).    Physical Exam  Constitutional:       General: He is not in acute distress.     Appearance: Normal appearance. He is normal weight. He is not ill-appearing, toxic-appearing or diaphoretic.   HENT:      Head: Normocephalic.      Nose: Nose normal.      Mouth/Throat:      Mouth: Mucous membranes are moist.      Pharynx: Oropharynx is clear.   Eyes:      Conjunctiva/sclera: Conjunctivae normal.   Cardiovascular:      Rate and Rhythm: Normal rate and regular rhythm.      Pulses: Normal pulses.      Heart sounds: Normal heart sounds. No murmur heard.  Pulmonary:      Effort: Pulmonary effort is normal. No respiratory distress.      Breath sounds: Normal breath sounds.   Abdominal:      General: Abdomen is flat. Bowel sounds are normal. There is no distension.      Palpations: Abdomen is soft.   Musculoskeletal:         General: Swelling and deformity (L foot amputation) present. Normal range of motion.      Cervical back: Normal range of motion.      Right lower leg: Edema present.      Left lower leg: Edema present.   Skin:     General: Skin is warm and dry.   Neurological:      General: No focal deficit present.      Mental Status: He is alert and oriented to person, place, and time. Mental status is at baseline.      Motor: No weakness.      Gait: Gait abnormal.   Psychiatric:         Mood and Affect: Mood normal.         Behavior: Behavior normal.         Thought Content: Thought content normal.         Judgment: Judgment normal.       Significant Labs: All pertinent labs within the past 24 hours have been reviewed.    Significant Imaging: I have reviewed all pertinent imaging results/findings within the past 24 hours.

## 2022-06-17 NOTE — ASSESSMENT & PLAN NOTE
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 Wound was found to be malodorous, ulcerated, increased drainage and depth, and exposed bone. See HPI for further detail.     6/15- blood culture- pending  6/15- L wound culture- + GBS/S. Aureus, pending.   6/15- L bone culture- pending     Isolated fever at 100.7, no leukocytosis noted. Vascular studies concerning for left lower leg arterial stenosis and calf vessel narrowing/occulsions. Tib/fib xray of the left leg notable for ulcer crater down to bone with osteomyelitis of the posterior calcaneus.    Pt is on cefepime and vancomycin. ID was consulted for suspected osteomyelitis left calcaneus.     Recommendations:  - Continue Cefepime and Vancomycin. Inpatient pharmacy to manage vanco dosing.   - Follow culture data, will tailor abxs accordingly.   - Follow vascular/podiatry surgical plans.   - Pt reviewed and seen per ID staff, Dr. Leyva. ID will follow.

## 2022-06-18 LAB
ALBUMIN SERPL BCP-MCNC: 2.4 G/DL (ref 3.5–5.2)
ALP SERPL-CCNC: 73 U/L (ref 55–135)
ALT SERPL W/O P-5'-P-CCNC: 8 U/L (ref 10–44)
ANION GAP SERPL CALC-SCNC: 11 MMOL/L (ref 8–16)
AST SERPL-CCNC: 12 U/L (ref 10–40)
BASOPHILS # BLD AUTO: 0.02 K/UL (ref 0–0.2)
BASOPHILS NFR BLD: 0.4 % (ref 0–1.9)
BILIRUB SERPL-MCNC: 0.3 MG/DL (ref 0.1–1)
BUN SERPL-MCNC: 13 MG/DL (ref 8–23)
CALCIUM SERPL-MCNC: 9.2 MG/DL (ref 8.7–10.5)
CHLORIDE SERPL-SCNC: 98 MMOL/L (ref 95–110)
CO2 SERPL-SCNC: 25 MMOL/L (ref 23–29)
CREAT SERPL-MCNC: 1.2 MG/DL (ref 0.5–1.4)
DIFFERENTIAL METHOD: ABNORMAL
EOSINOPHIL # BLD AUTO: 0.3 K/UL (ref 0–0.5)
EOSINOPHIL NFR BLD: 7 % (ref 0–8)
ERYTHROCYTE [DISTWIDTH] IN BLOOD BY AUTOMATED COUNT: 16.9 % (ref 11.5–14.5)
EST. GFR  (AFRICAN AMERICAN): >60 ML/MIN/1.73 M^2
EST. GFR  (NON AFRICAN AMERICAN): 59.6 ML/MIN/1.73 M^2
GLUCOSE SERPL-MCNC: 246 MG/DL (ref 70–110)
HCT VFR BLD AUTO: 32.8 % (ref 40–54)
HGB BLD-MCNC: 10.3 G/DL (ref 14–18)
IMM GRANULOCYTES # BLD AUTO: 0.01 K/UL (ref 0–0.04)
IMM GRANULOCYTES NFR BLD AUTO: 0.2 % (ref 0–0.5)
LYMPHOCYTES # BLD AUTO: 0.9 K/UL (ref 1–4.8)
LYMPHOCYTES NFR BLD: 18.1 % (ref 18–48)
MCH RBC QN AUTO: 25.6 PG (ref 27–31)
MCHC RBC AUTO-ENTMCNC: 31.4 G/DL (ref 32–36)
MCV RBC AUTO: 82 FL (ref 82–98)
MONOCYTES # BLD AUTO: 0.8 K/UL (ref 0.3–1)
MONOCYTES NFR BLD: 15.4 % (ref 4–15)
NEUTROPHILS # BLD AUTO: 2.9 K/UL (ref 1.8–7.7)
NEUTROPHILS NFR BLD: 58.9 % (ref 38–73)
NRBC BLD-RTO: 0 /100 WBC
PLATELET # BLD AUTO: 315 K/UL (ref 150–450)
PMV BLD AUTO: 10.2 FL (ref 9.2–12.9)
POCT GLUCOSE: 254 MG/DL (ref 70–110)
POCT GLUCOSE: 256 MG/DL (ref 70–110)
POCT GLUCOSE: 269 MG/DL (ref 70–110)
POCT GLUCOSE: 286 MG/DL (ref 70–110)
POTASSIUM SERPL-SCNC: 4.3 MMOL/L (ref 3.5–5.1)
PROT SERPL-MCNC: 7.1 G/DL (ref 6–8.4)
RBC # BLD AUTO: 4.02 M/UL (ref 4.6–6.2)
SODIUM SERPL-SCNC: 134 MMOL/L (ref 136–145)
VANCOMYCIN TROUGH SERPL-MCNC: 23 UG/ML (ref 10–22)
WBC # BLD AUTO: 4.86 K/UL (ref 3.9–12.7)

## 2022-06-18 PROCEDURE — 99233 PR SUBSEQUENT HOSPITAL CARE,LEVL III: ICD-10-PCS | Mod: 95,,, | Performed by: INTERNAL MEDICINE

## 2022-06-18 PROCEDURE — C9399 UNCLASSIFIED DRUGS OR BIOLOG: HCPCS | Performed by: INTERNAL MEDICINE

## 2022-06-18 PROCEDURE — 80202 ASSAY OF VANCOMYCIN: CPT | Performed by: STUDENT IN AN ORGANIZED HEALTH CARE EDUCATION/TRAINING PROGRAM

## 2022-06-18 PROCEDURE — 25000003 PHARM REV CODE 250: Performed by: HOSPITALIST

## 2022-06-18 PROCEDURE — 99233 SBSQ HOSP IP/OBS HIGH 50: CPT | Mod: 95,,, | Performed by: INTERNAL MEDICINE

## 2022-06-18 PROCEDURE — 25000003 PHARM REV CODE 250: Performed by: STUDENT IN AN ORGANIZED HEALTH CARE EDUCATION/TRAINING PROGRAM

## 2022-06-18 PROCEDURE — 25000003 PHARM REV CODE 250: Performed by: INTERNAL MEDICINE

## 2022-06-18 PROCEDURE — 85025 COMPLETE CBC W/AUTO DIFF WBC: CPT | Performed by: STUDENT IN AN ORGANIZED HEALTH CARE EDUCATION/TRAINING PROGRAM

## 2022-06-18 PROCEDURE — 36415 COLL VENOUS BLD VENIPUNCTURE: CPT | Performed by: STUDENT IN AN ORGANIZED HEALTH CARE EDUCATION/TRAINING PROGRAM

## 2022-06-18 PROCEDURE — 80053 COMPREHEN METABOLIC PANEL: CPT | Performed by: STUDENT IN AN ORGANIZED HEALTH CARE EDUCATION/TRAINING PROGRAM

## 2022-06-18 PROCEDURE — 20600001 HC STEP DOWN PRIVATE ROOM

## 2022-06-18 PROCEDURE — 63600175 PHARM REV CODE 636 W HCPCS: Performed by: INTERNAL MEDICINE

## 2022-06-18 PROCEDURE — 63600175 PHARM REV CODE 636 W HCPCS: Performed by: STUDENT IN AN ORGANIZED HEALTH CARE EDUCATION/TRAINING PROGRAM

## 2022-06-18 RX ORDER — NIFEDIPINE 30 MG/1
30 TABLET, EXTENDED RELEASE ORAL DAILY
Status: DISCONTINUED | OUTPATIENT
Start: 2022-06-18 | End: 2022-06-20

## 2022-06-18 RX ORDER — INSULIN ASPART 100 [IU]/ML
0-5 INJECTION, SOLUTION INTRAVENOUS; SUBCUTANEOUS
Status: DISCONTINUED | OUTPATIENT
Start: 2022-06-18 | End: 2022-07-08 | Stop reason: HOSPADM

## 2022-06-18 RX ORDER — HYDRALAZINE HYDROCHLORIDE 25 MG/1
25 TABLET, FILM COATED ORAL EVERY 8 HOURS PRN
Status: DISCONTINUED | OUTPATIENT
Start: 2022-06-18 | End: 2022-06-23

## 2022-06-18 RX ORDER — GLUCAGON 1 MG
1 KIT INJECTION
Status: DISCONTINUED | OUTPATIENT
Start: 2022-06-18 | End: 2022-07-08 | Stop reason: HOSPADM

## 2022-06-18 RX ORDER — INSULIN ASPART 100 [IU]/ML
4 INJECTION, SOLUTION INTRAVENOUS; SUBCUTANEOUS
Status: DISCONTINUED | OUTPATIENT
Start: 2022-06-19 | End: 2022-06-19

## 2022-06-18 RX ORDER — IBUPROFEN 200 MG
24 TABLET ORAL
Status: DISCONTINUED | OUTPATIENT
Start: 2022-06-18 | End: 2022-07-08 | Stop reason: HOSPADM

## 2022-06-18 RX ORDER — IBUPROFEN 200 MG
16 TABLET ORAL
Status: DISCONTINUED | OUTPATIENT
Start: 2022-06-18 | End: 2022-07-08 | Stop reason: HOSPADM

## 2022-06-18 RX ADMIN — TAMSULOSIN HYDROCHLORIDE 0.8 MG: 0.4 CAPSULE ORAL at 08:06

## 2022-06-18 RX ADMIN — GABAPENTIN 200 MG: 100 CAPSULE ORAL at 08:06

## 2022-06-18 RX ADMIN — SUCRALFATE 1 G: 1 SUSPENSION ORAL at 06:06

## 2022-06-18 RX ADMIN — ALUMINUM HYDROXIDE, MAGNESIUM HYDROXIDE, AND SIMETHICONE 30 ML: 200; 200; 20 SUSPENSION ORAL at 05:06

## 2022-06-18 RX ADMIN — ALUMINUM HYDROXIDE, MAGNESIUM HYDROXIDE, AND SIMETHICONE 30 ML: 200; 200; 20 SUSPENSION ORAL at 06:06

## 2022-06-18 RX ADMIN — LOSARTAN POTASSIUM AND HYDROCHLOROTHIAZIDE 1 TABLET: 100; 25 TABLET, FILM COATED ORAL at 08:06

## 2022-06-18 RX ADMIN — GABAPENTIN 200 MG: 100 CAPSULE ORAL at 09:06

## 2022-06-18 RX ADMIN — SUCRALFATE 1 G: 1 SUSPENSION ORAL at 12:06

## 2022-06-18 RX ADMIN — CEFEPIME HYDROCHLORIDE 2 G: 2 INJECTION, SOLUTION INTRAVENOUS at 05:06

## 2022-06-18 RX ADMIN — HYDRALAZINE HYDROCHLORIDE 25 MG: 25 TABLET, FILM COATED ORAL at 06:06

## 2022-06-18 RX ADMIN — VANCOMYCIN HYDROCHLORIDE 1500 MG: 1.5 INJECTION, POWDER, LYOPHILIZED, FOR SOLUTION INTRAVENOUS at 06:06

## 2022-06-18 RX ADMIN — ASPIRIN 81 MG: 81 TABLET, COATED ORAL at 08:06

## 2022-06-18 RX ADMIN — NIFEDIPINE 30 MG: 30 TABLET, FILM COATED, EXTENDED RELEASE ORAL at 12:06

## 2022-06-18 RX ADMIN — INSULIN ASPART 1 UNITS: 100 INJECTION, SOLUTION INTRAVENOUS; SUBCUTANEOUS at 09:06

## 2022-06-18 RX ADMIN — INSULIN ASPART 6 UNITS: 100 INJECTION, SOLUTION INTRAVENOUS; SUBCUTANEOUS at 08:06

## 2022-06-18 RX ADMIN — SUCRALFATE 1 G: 1 SUSPENSION ORAL at 01:06

## 2022-06-18 RX ADMIN — VANCOMYCIN HYDROCHLORIDE 1750 MG: 10 INJECTION, POWDER, LYOPHILIZED, FOR SOLUTION INTRAVENOUS at 06:06

## 2022-06-18 RX ADMIN — INSULIN DETEMIR 15 UNITS: 100 INJECTION, SOLUTION SUBCUTANEOUS at 09:06

## 2022-06-18 RX ADMIN — INSULIN ASPART 6 UNITS: 100 INJECTION, SOLUTION INTRAVENOUS; SUBCUTANEOUS at 12:06

## 2022-06-18 RX ADMIN — SUCRALFATE 1 G: 1 SUSPENSION ORAL at 05:06

## 2022-06-18 RX ADMIN — ATORVASTATIN CALCIUM 80 MG: 20 TABLET, FILM COATED ORAL at 08:06

## 2022-06-18 RX ADMIN — CEFEPIME HYDROCHLORIDE 2 G: 2 INJECTION, SOLUTION INTRAVENOUS at 01:06

## 2022-06-18 RX ADMIN — ALUMINUM HYDROXIDE, MAGNESIUM HYDROXIDE, AND SIMETHICONE 30 ML: 200; 200; 20 SUSPENSION ORAL at 12:06

## 2022-06-18 RX ADMIN — INSULIN ASPART 6 UNITS: 100 INJECTION, SOLUTION INTRAVENOUS; SUBCUTANEOUS at 05:06

## 2022-06-18 RX ADMIN — ALUMINUM HYDROXIDE, MAGNESIUM HYDROXIDE, AND SIMETHICONE 30 ML: 200; 200; 20 SUSPENSION ORAL at 09:06

## 2022-06-18 RX ADMIN — CEFEPIME HYDROCHLORIDE 2 G: 2 INJECTION, SOLUTION INTRAVENOUS at 08:06

## 2022-06-18 NOTE — PLAN OF CARE
Problem: Adult Inpatient Plan of Care  Goal: Plan of Care Review  Outcome: Ongoing, Progressing  Goal: Patient-Specific Goal (Individualized)  Outcome: Ongoing, Progressing  Goal: Optimal Comfort and Wellbeing  Outcome: Ongoing, Progressing     Problem: Diabetes Comorbidity  Goal: Blood Glucose Level Within Targeted Range  Outcome: Ongoing, Progressing     Problem: Impaired Wound Healing  Goal: Optimal Wound Healing  Outcome: Ongoing, Progressing     Problem: Renal Function Impairment (Acute Kidney Injury/Impairment)  Goal: Effective Renal Function  Outcome: Ongoing, Progressing     Problem: Skin Injury Risk Increased  Goal: Skin Health and Integrity  Outcome: Ongoing, Progressing     POC reviewed. Pt refused to reposition. Educated on important of prevent skin breakdown. No complaint of discomfort/pain. Call light in reach. Frequent safety check performed. [facility]

## 2022-06-18 NOTE — SUBJECTIVE & OBJECTIVE
Telemedicine  This service was provided by Virtual Visit.    Patient was transferred to [facility] on:  6/18/2022    Chief Complaint   Patient presents with    Wound Check     Sent from podiatry and stated in pain. L toes amputated     The patient location is: 8092/8092 A   Admitted 6/15/2022  2:10 PM  Present with the patient at the time of the telemed/virtual assessment: Telepresenter    Interval History / Events Overnight:   The patient is able to provide adequate history. Additional history was obtained from past medical records. No significant events reported by Nursing. BP elevated.  Patient complains of nothing specific. Symptoms have been unchanged since yesterday. Associated symptoms include: fatigue. Symptoms are stable.     Lab test(s) reviewed: hyperglycemia    Review of Systems   Constitutional:  Negative for fever.   Respiratory:  Negative for shortness of breath.      Objective:     Vital Signs (Most Recent):  Temp: 96.6 °F (35.9 °C) (06/18/22 0713)  Pulse: (!) 53 (06/18/22 0714)  Resp: 20 (06/18/22 0714)  BP: (!) 173/74 (06/18/22 0714)  SpO2: (!) 94 % (06/18/22 0837)   Vital Signs (24h Range):  Temp:  [96.6 °F (35.9 °C)-99.2 °F (37.3 °C)] 96.6 °F (35.9 °C)  Pulse:  [53-75] 53  Resp:  [14-20] 20  SpO2:  [92 %-96 %] 94 %  BP: (136-174)/() 173/74     Weight: 124.3 kg (274 lb 0.5 oz)  Body mass index is 40.47 kg/m².    Intake/Output Summary (Last 24 hours) at 6/18/2022 1020  Last data filed at 6/18/2022 0928  Gross per 24 hour   Intake 940 ml   Output 2050 ml   Net -1110 ml      Physical Exam  Constitutional:       General: He is not in acute distress.     Appearance: Normal appearance.   Eyes:      General: Lids are normal. No scleral icterus.        Right eye: No discharge.         Left eye: No discharge.      Conjunctiva/sclera: Conjunctivae normal.   Neck:      Trachea: Phonation normal.   Cardiovascular:      Rate and Rhythm: Bradycardia present.      Comments: Monitor / Vital  signs reviewed at time of visit  Pulmonary:      Effort: Pulmonary effort is normal. No tachypnea, accessory muscle usage or respiratory distress.   Abdominal:      General: There is no distension.   Musculoskeletal:      Left foot: Deformity (s/p partial amputation) present.   Skin:     Coloration: Skin is not cyanotic.   Neurological:      Mental Status: He is alert. He is not disoriented.   Psychiatric:         Attention and Perception: Attention normal.         Behavior: Behavior is cooperative.       Significant Labs:   Recent Labs   Lab 06/16/22  1100   HGBA1C 9.5*     Recent Labs   Lab 06/18/22  0714 06/18/22  1145 06/18/22  1715   POCTGLUCOSE 256* 286* 254*     Recent Labs   Lab 06/15/22  1633 06/16/22  1100 06/18/22  0328   WBC 6.67 4.56 4.86   HGB 10.7* 10.8* 10.3*   HCT 35.0* 36.0* 32.8*    320 315     Recent Labs   Lab 06/15/22  1633 06/16/22  1100 06/18/22  0328   GRAN 59.1  3.9 59.0  2.7 58.9  2.9   LYMPH 25.6  1.7 24.6  1.1 18.1  0.9*   MONO 9.9  0.7 10.5  0.5 15.4*  0.8   EOS 0.3 0.2 0.3     Recent Labs   Lab 06/15/22  1633 06/16/22  1100 06/18/22  0328    138 134*   K 3.9 3.7 4.3    104 98   CO2 25 24 25   BUN 10 9 13   CREATININE 1.1 0.9 1.2   GLU 68* 81 246*   CALCIUM 9.2 9.1 9.2   ALBUMIN 2.8* 2.6* 2.4*   MG 1.7  --   --    PHOS 3.2  --   --      Recent Labs   Lab 06/15/22  1633 06/16/22  1100 06/18/22  0328   ALKPHOS 87 84 73   ALT 11 7* 8*   AST 12 11 12   PROT 8.0 7.5 7.1   BILITOT 0.4 0.5 0.3   CRP 26.7*  --   --      Recent Labs   Lab 02/23/22  1407 06/15/22  1633   LACTATE  --  1.4   SEDRATE >120* >120*     SARS-CoV2 (COVID-19) Qualitative PCR (no units)   Date Value   03/29/2021 Not Detected   03/15/2021 Not Detected   03/02/2021 Not Detected   02/22/2021 Not Detected   01/29/2021 Not Detected     SARS-CoV-2 RNA, Amplification, Qual (no units)   Date Value   02/03/2021 Negative     POC Rapid COVID (no units)   Date Value   06/15/2022 Negative   02/18/2021  Negative   12/08/2020 Negative     CTA Runoff ABD PEL Bilat Lower Ext  Narrative: EXAMINATION:  CTA RUNOFF ABD PEL BILAT LOWER EXT    CLINICAL HISTORY:  Claudication or leg ischemia;Peripheral arterial disease (PAD), asymptomatic;    TECHNIQUE:  CT angiogram of the abdomen/pelvis with lower extremity runoff using xyz mL of  Omnipaque 350 IV contrast.  Multiplanar reconstructions performed.    COMPARISON:  CT abdomen pelvis 02/03/2012    FINDINGS:  Upper extremities within the gantry resulting in beam hardening artifact limiting assessment.    Abdominopelvic Vasculature:    Visualized aorta demonstrates mild calcific atherosclerosis with no convincing evidence of dissection, aneurysm, high-grade stenosis.    Renal, superior mesenteric, and celiac arteries are patent.  GEORGIE is not well assessed due to artifact and poor opacification however appears patent with possible mild to moderate narrowing at the origin.    Common and external iliac arteries demonstrate no hemodynamically significant stenosis.  Bilateral internal iliac arteries demonstrate calcific and noncalcific atherosclerosis likely resulting in areas of moderate narrowing not well assessed due to artifact and poor opacification.    Right lower extremity:    Right common femoral and proximal superficial femoral arteries demonstrate no hemodynamically significant stenosis.  Right DFA with probable multifocal high-grade narrowing/occlusions versus poor opacification.  Moderate atherosclerotic disease of the mid to distal SFA with suspected areas of moderate to high-grade narrowing difficult to assess due to poor opacification.    Right popliteal artery with suspected multifocal moderate to high-grade narrowing limited assessment due to poor opacification.    Moderate narrowing at the trifurcation.    Suspected moderate narrowing at the peroneal origin with severe atherosclerotic disease and probable occlusions distally.    Severe narrowing at the origin of the  posterior tibial artery with multifocal severe atherosclerotic disease distally limiting assessment.    Suspected high-grade narrowing at the origin of the anterior tibial artery, otherwise patent.    Left lower extremity:    Left common femoral and superficial femoral arteries are likely patent.  Left DFA with suspected multifocal high-grade narrowing/occlusions versus poor opacification.    Suspected multifocal occlusions of the distal left popliteal artery.  Trifurcation with high-grade narrowing.    Severe atherosclerotic disease of the posterior tibial artery and suspected occlusion of the origin.    Multifocal occlusions or high-grade narrowing of the peroneal artery.    Anterior tibial artery with areas of patency otherwise there is severe atherosclerotic disease with multifocal high-grade narrowing and possible short-segment occlusions.    Bilateral lower extremities demonstrate diffuse subcutaneous edema and left-sided asymmetric muscular atrophy compared to the right.  Advanced degenerative changes of the knees.    Inferior Mediastinum/Heart: Normal.    Lung Bases: No nodules or consolidation.    Peritoneal Space: No ascites. No free air.    Liver: No focal mass.    Gallbladder: Tiny suspected gas foci within the gallbladder neck lumen which may suggest stones containing gas.    Bile Ducts: No evidence of dilated ducts.    Pancreas: No mass or peripancreatic fat stranding.    Spleen: Normal.    Adrenals: Unremarkable.    Bowel/Mesentery: No evidence of bowel obstruction or inflammation.  Diverticulosis coli.    Urinary Tract: No evidence of obstructive uropathy. Kidneys enhance symmetrically.  Left renal 1.8 cm cyst.    Retroperitoneum:  No significant adenopathy.    Pelvis:  Prostatomegaly.    Thoracic/Abdominal wall:  Gynecomastia bilaterally.    Bones: Moderate degenerative changes of the spine with multilevel mild endplate compression deformities of the lumbar spine unchanged.  Impression: Poor  contrast opacification of the vasculature limits assessment.    Severe atherosclerotic disease of the vasculature.  Multifocal high-grade narrowing or occlusions of the calf vessels.  Vascular narrowing/occlusions may be overestimated due to poor opacification.    Electronically signed by resident: Jaylan Griffin  Date:    06/16/2022  Time:    15:08    Electronically signed by: Eric Carlos MD  Date:    06/16/2022  Time:    16:18  VAS Toe Brachial Indices Resting  Indication  ========    Peripheral Arterial Disease    Pressure Lower  ============    Rt brachial A syst BP  178 mmHg  Rt PTA BP  255 mmHg  Rt dors pedis A  mmHg  Rt toe BP  130 mmHg  Right MARIO ratio use:   post. tibial  Rt MARIO post tibial (rt post tib A BP / max brach A BP) 1.43  Rt MARIO (rt dors ped A BP / max brach A BP) 1.43  Rt ankle BP / max brach A BP   1.43  Rt TBI (rt toe BP / max brach A BP)    0.73  Lt PTA BP  255 mmHg  Left MARIO ratio use:    post. tibial  Lt MARIO (lt post tibial A BP / max brach A BP)  1.43  Lt ankle BP / max brach A BP   1.43    PPG Lower  =========    Rt toe BP - PPG    130 mmHg  Rt toe digit waveform: severely dampened    Impression  =========    Right Leg: Segmental pressures are unreliable due to medial calcinosis; however, PVR waveforms suggest mild peripheral arterial  occlusive disease.  Left Leg: Segmental pressures are unreliable due to medial calcinosis; however, PVR waveforms suggest mild peripheral arterial  occlusive disease. Absent DPA signal.    -Bilateral Toe PPG's and Pressures were performed.  Rt lower digits: Toe PPG waveforms as described above. - TBI of 0.73 with a Great toe pressure of 130 mmHg mmHg is noted.  Lt lower digits: History of foot amputation.    DATE OF SERVICE: 06/16/2022                                                      Sonographer: Leatha Devi RDMS, RVT  Electronically Signed by: ADAM Jauregui M.D. at 06/16/2022-10:53      Labs and Imaging within the last 24 hours listed above  were reviewed.       Diet: Diet diabetic [facility]; 2000 Calorie; Cardiac (Low Na/Chol)  Significant LDAs:   IV Access Type: Peripheral  Urinary Catheter Indication if present: Patient Does Not Have Urinary Catheter  Other Lines/Tubes/Drains:    HIGH RISK CONDITION(S):   Patient has a condition that poses threat to life and bodily function: ischemic limb with need for probable amputation     Goals of Care:    Previous admission:  2/17/21  Likely prognosis:  Fair  Code Status: Full Code  Comfort Only: No  Hospice: No  Goals at discharge: remain at home, with physician follow-up    Discharge Planning:  OXANA: 6/20/2022     Code Status: Full Code   Is the patient medically ready for discharge?: No    Reason for patient still in hospital (select all that apply): Patient trending condition, Treatment, Consult recommendations, and Pending disposition  Discharge Plan A: Home with family

## 2022-06-19 LAB
POCT GLUCOSE: 152 MG/DL (ref 70–110)
POCT GLUCOSE: 224 MG/DL (ref 70–110)
POCT GLUCOSE: 327 MG/DL (ref 70–110)
POCT GLUCOSE: 341 MG/DL (ref 70–110)

## 2022-06-19 PROCEDURE — 99233 PR SUBSEQUENT HOSPITAL CARE,LEVL III: ICD-10-PCS | Mod: ,,, | Performed by: PHYSICIAN ASSISTANT

## 2022-06-19 PROCEDURE — 99233 PR SUBSEQUENT HOSPITAL CARE,LEVL III: ICD-10-PCS | Mod: 95,,, | Performed by: INTERNAL MEDICINE

## 2022-06-19 PROCEDURE — 20600001 HC STEP DOWN PRIVATE ROOM

## 2022-06-19 PROCEDURE — 93010 EKG 12-LEAD: ICD-10-PCS | Mod: ,,, | Performed by: INTERNAL MEDICINE

## 2022-06-19 PROCEDURE — 25000003 PHARM REV CODE 250: Performed by: INTERNAL MEDICINE

## 2022-06-19 PROCEDURE — 99233 SBSQ HOSP IP/OBS HIGH 50: CPT | Mod: ,,, | Performed by: PHYSICIAN ASSISTANT

## 2022-06-19 PROCEDURE — 99233 SBSQ HOSP IP/OBS HIGH 50: CPT | Mod: 95,,, | Performed by: INTERNAL MEDICINE

## 2022-06-19 PROCEDURE — 63600175 PHARM REV CODE 636 W HCPCS: Performed by: INTERNAL MEDICINE

## 2022-06-19 PROCEDURE — 93010 ELECTROCARDIOGRAM REPORT: CPT | Mod: ,,, | Performed by: INTERNAL MEDICINE

## 2022-06-19 PROCEDURE — 63600175 PHARM REV CODE 636 W HCPCS: Performed by: PHYSICIAN ASSISTANT

## 2022-06-19 PROCEDURE — 93005 ELECTROCARDIOGRAM TRACING: CPT

## 2022-06-19 PROCEDURE — 25000003 PHARM REV CODE 250: Performed by: HOSPITALIST

## 2022-06-19 PROCEDURE — 63600175 PHARM REV CODE 636 W HCPCS: Performed by: STUDENT IN AN ORGANIZED HEALTH CARE EDUCATION/TRAINING PROGRAM

## 2022-06-19 RX ORDER — INSULIN ASPART 100 [IU]/ML
8 INJECTION, SOLUTION INTRAVENOUS; SUBCUTANEOUS
Status: DISCONTINUED | OUTPATIENT
Start: 2022-06-19 | End: 2022-06-20

## 2022-06-19 RX ADMIN — SUCRALFATE 1 G: 1 SUSPENSION ORAL at 06:06

## 2022-06-19 RX ADMIN — CEFEPIME HYDROCHLORIDE 2 G: 2 INJECTION, SOLUTION INTRAVENOUS at 09:06

## 2022-06-19 RX ADMIN — HYDRALAZINE HYDROCHLORIDE 25 MG: 25 TABLET, FILM COATED ORAL at 09:06

## 2022-06-19 RX ADMIN — ALUMINUM HYDROXIDE, MAGNESIUM HYDROXIDE, AND SIMETHICONE 30 ML: 200; 200; 20 SUSPENSION ORAL at 06:06

## 2022-06-19 RX ADMIN — GABAPENTIN 200 MG: 100 CAPSULE ORAL at 08:06

## 2022-06-19 RX ADMIN — SUCRALFATE 1 G: 1 SUSPENSION ORAL at 11:06

## 2022-06-19 RX ADMIN — LOSARTAN POTASSIUM AND HYDROCHLOROTHIAZIDE 1 TABLET: 100; 25 TABLET, FILM COATED ORAL at 08:06

## 2022-06-19 RX ADMIN — INSULIN ASPART 8 UNITS: 100 INJECTION, SOLUTION INTRAVENOUS; SUBCUTANEOUS at 04:06

## 2022-06-19 RX ADMIN — ALUMINUM HYDROXIDE, MAGNESIUM HYDROXIDE, AND SIMETHICONE 30 ML: 200; 200; 20 SUSPENSION ORAL at 09:06

## 2022-06-19 RX ADMIN — ASPIRIN 81 MG: 81 TABLET, COATED ORAL at 08:06

## 2022-06-19 RX ADMIN — INSULIN ASPART 1 UNITS: 100 INJECTION, SOLUTION INTRAVENOUS; SUBCUTANEOUS at 10:06

## 2022-06-19 RX ADMIN — VANCOMYCIN HYDROCHLORIDE 1500 MG: 1.5 INJECTION, POWDER, LYOPHILIZED, FOR SOLUTION INTRAVENOUS at 07:06

## 2022-06-19 RX ADMIN — INSULIN ASPART 4 UNITS: 100 INJECTION, SOLUTION INTRAVENOUS; SUBCUTANEOUS at 07:06

## 2022-06-19 RX ADMIN — GABAPENTIN 200 MG: 100 CAPSULE ORAL at 09:06

## 2022-06-19 RX ADMIN — ATORVASTATIN CALCIUM 80 MG: 20 TABLET, FILM COATED ORAL at 08:06

## 2022-06-19 RX ADMIN — ALUMINUM HYDROXIDE, MAGNESIUM HYDROXIDE, AND SIMETHICONE 30 ML: 200; 200; 20 SUSPENSION ORAL at 11:06

## 2022-06-19 RX ADMIN — SUCRALFATE 1 G: 1 SUSPENSION ORAL at 01:06

## 2022-06-19 RX ADMIN — TAMSULOSIN HYDROCHLORIDE 0.8 MG: 0.4 CAPSULE ORAL at 08:06

## 2022-06-19 RX ADMIN — INSULIN ASPART 5 UNITS: 100 INJECTION, SOLUTION INTRAVENOUS; SUBCUTANEOUS at 07:06

## 2022-06-19 RX ADMIN — VANCOMYCIN HYDROCHLORIDE 1500 MG: 1.5 INJECTION, POWDER, LYOPHILIZED, FOR SOLUTION INTRAVENOUS at 06:06

## 2022-06-19 RX ADMIN — INSULIN ASPART 8 UNITS: 100 INJECTION, SOLUTION INTRAVENOUS; SUBCUTANEOUS at 11:06

## 2022-06-19 RX ADMIN — SUCRALFATE 1 G: 1 SUSPENSION ORAL at 07:06

## 2022-06-19 RX ADMIN — INSULIN ASPART 4 UNITS: 100 INJECTION, SOLUTION INTRAVENOUS; SUBCUTANEOUS at 11:06

## 2022-06-19 RX ADMIN — CEFTRIAXONE 2 G: 2 INJECTION, SOLUTION INTRAVENOUS at 11:06

## 2022-06-19 RX ADMIN — ALUMINUM HYDROXIDE, MAGNESIUM HYDROXIDE, AND SIMETHICONE 30 ML: 200; 200; 20 SUSPENSION ORAL at 04:06

## 2022-06-19 RX ADMIN — CEFEPIME HYDROCHLORIDE 2 G: 2 INJECTION, SOLUTION INTRAVENOUS at 01:06

## 2022-06-19 RX ADMIN — NIFEDIPINE 30 MG: 30 TABLET, FILM COATED, EXTENDED RELEASE ORAL at 08:06

## 2022-06-19 NOTE — ASSESSMENT & PLAN NOTE
-Bradycardic in ED but asymptomatic, chronic issue  -Repeat EKG 6/19:  BPM 44 Normal sinus rhythm - AV block, 2:1 - Nonspecific T wave abnormality

## 2022-06-19 NOTE — PROGRESS NOTES
[facility] - Telemetry Stepdown  Vascular Surgery  Progress Note    Patient Name: Saji Castañeda  MRN: [id number]  Admission Date: 6/15/2022  Primary Care Provider: Mart Escalante MD    Subjective:     Interval History:   No acute events overnight  Bone biopsy with podiatry --> osteo  On vanc and Cefepime    Post-Op Info:  * No surgery found *         No new subjective & objective note has been filed under this hospital service since the last note was generated.    Assessment/Plan:     Peripheral arterial disease  Saji Castañeda is a 70 y.o. male who has a past medical history of Arthritis, Diabetes mellitus, Diabetes mellitus, type 2, Hyperlipidemia, and Osteomyelitis, chronic lymphedema with chronic bilateral lower extremity wounds who presents with worsening and nonhealing left lower extremity wounds:     -- Patient still refusing any amputation at this time   -- CTA shows left Popliteal occlusions and distal SFA disease, tibials appear patent but heavily calcified  -- Angiogram from 2/2021: Tortuous but non-diseased aortoiliac system, Left Femoral popliteal segments without disease, Left tibial vessels without disease, 3 vessel runoff to foot, R femoral popliteal segments without disease, and R 2 vessel runoff to foot.  -- S/P bone biopsy with podiatry    -- He will make a decision in the following days       Manny Darden MD  Vascular Surgery  [facility] - Telemetry Stepdown

## 2022-06-19 NOTE — SUBJECTIVE & OBJECTIVE
Telemedicine  This service was provided by Virtual Visit.    Patient was transferred to [facility] on:  6/18/2022    Chief Complaint   Patient presents with    Wound Check     Sent from podiatry and stated in pain. L toes amputated     The patient location is: 8092/8092 A   Admitted 6/15/2022  2:10 PM  Present with the patient at the time of the telemed/virtual assessment: Telepresenter    Interval History / Events Overnight:   The patient is able to provide adequate history. Additional history was obtained from past medical records. No significant events reported by Nursing. BP elevated. Jose Cruz this AM; asymptomatic  Patient complains of nothing specific. Symptoms have been unchanged since yesterday. Associated symptoms include: fatigue. Symptoms are stable.     Lab test(s) reviewed: hyperglycemia  Other diagnostic test reviewed EKG    Review of Systems   Constitutional:  Negative for fever.   Respiratory:  Negative for shortness of breath.      Objective:     Vital Signs (Most Recent):  Temp: 96.6 °F (35.9 °C) (06/19/22 0706)  Pulse: (!) 43 (06/19/22 0706)  Resp: 18 (06/19/22 0706)  BP: (!) 148/71 (06/19/22 0706)  SpO2: (!) 94 % (06/19/22 0706)   Vital Signs (24h Range):  Temp:  [96.6 °F (35.9 °C)-98.8 °F (37.1 °C)] 96.6 °F (35.9 °C)  Pulse:  [43-82] 43  Resp:  [16-18] 18  SpO2:  [92 %-94 %] 94 %  BP: (128-167)/(60-72) 148/71     Weight: 124.3 kg (274 lb 0.5 oz)  Body mass index is 40.47 kg/m².    Intake/Output Summary (Last 24 hours) at 6/19/2022 1026  Last data filed at 6/19/2022 0901  Gross per 24 hour   Intake 480 ml   Output 1300 ml   Net -820 ml        Physical Exam  Constitutional:       General: He is not in acute distress.     Appearance: Normal appearance.   Eyes:      General: Lids are normal. No scleral icterus.        Right eye: No discharge.         Left eye: No discharge.      Conjunctiva/sclera: Conjunctivae normal.   Neck:      Trachea: Phonation normal.   Cardiovascular:      Rate  and Rhythm: Bradycardia present.      Comments: Monitor / Vital signs reviewed at time of visit  Pulmonary:      Effort: Pulmonary effort is normal. No tachypnea, accessory muscle usage or respiratory distress.   Abdominal:      General: There is no distension.   Musculoskeletal:      Left foot: Deformity (s/p partial amputation) present.   Skin:     Coloration: Skin is not cyanotic.   Neurological:      Mental Status: He is alert. He is not disoriented.   Psychiatric:         Attention and Perception: Attention normal.         Behavior: Behavior is cooperative.       Significant Labs:   Recent Labs   Lab 06/16/22  1100   HGBA1C 9.5*       Recent Labs   Lab 06/18/22  1715 06/18/22 2126 06/19/22  0709   POCTGLUCOSE 254* 269* 327*       Recent Labs   Lab 06/15/22  1633 06/16/22  1100 06/18/22  0328   WBC 6.67 4.56 4.86   HGB 10.7* 10.8* 10.3*   HCT 35.0* 36.0* 32.8*    320 315       Recent Labs   Lab 06/15/22  1633 06/16/22  1100 06/18/22  0328   GRAN 59.1  3.9 59.0  2.7 58.9  2.9   LYMPH 25.6  1.7 24.6  1.1 18.1  0.9*   MONO 9.9  0.7 10.5  0.5 15.4*  0.8   EOS 0.3 0.2 0.3       Recent Labs   Lab 06/15/22  1633 06/16/22  1100 06/18/22  0328    138 134*   K 3.9 3.7 4.3    104 98   CO2 25 24 25   BUN 10 9 13   CREATININE 1.1 0.9 1.2   GLU 68* 81 246*   CALCIUM 9.2 9.1 9.2   ALBUMIN 2.8* 2.6* 2.4*   MG 1.7  --   --    PHOS 3.2  --   --        Recent Labs   Lab 06/15/22  1633 06/16/22  1100 06/18/22  0328   ALKPHOS 87 84 73   ALT 11 7* 8*   AST 12 11 12   PROT 8.0 7.5 7.1   BILITOT 0.4 0.5 0.3   CRP 26.7*  --   --        Recent Labs   Lab 02/23/22  1407 06/15/22  1633   LACTATE  --  1.4   SEDRATE >120* >120*       SARS-CoV2 (COVID-19) Qualitative PCR (no units)   Date Value   03/29/2021 Not Detected   03/15/2021 Not Detected   03/02/2021 Not Detected   02/22/2021 Not Detected   01/29/2021 Not Detected     SARS-CoV-2 RNA, Amplification, Qual (no units)   Date Value   02/03/2021 Negative     POC  Rapid COVID (no units)   Date Value   06/15/2022 Negative   02/18/2021 Negative   12/08/2020 Negative     CTA Runoff ABD PEL Bilat Lower Ext  Narrative: EXAMINATION:  CTA RUNOFF ABD PEL BILAT LOWER EXT    CLINICAL HISTORY:  Claudication or leg ischemia;Peripheral arterial disease (PAD), asymptomatic;    TECHNIQUE:  CT angiogram of the abdomen/pelvis with lower extremity runoff using xyz mL of  Omnipaque 350 IV contrast.  Multiplanar reconstructions performed.    COMPARISON:  CT abdomen pelvis 02/03/2012    FINDINGS:  Upper extremities within the gantry resulting in beam hardening artifact limiting assessment.    Abdominopelvic Vasculature:    Visualized aorta demonstrates mild calcific atherosclerosis with no convincing evidence of dissection, aneurysm, high-grade stenosis.    Renal, superior mesenteric, and celiac arteries are patent.  GEORGIE is not well assessed due to artifact and poor opacification however appears patent with possible mild to moderate narrowing at the origin.    Common and external iliac arteries demonstrate no hemodynamically significant stenosis.  Bilateral internal iliac arteries demonstrate calcific and noncalcific atherosclerosis likely resulting in areas of moderate narrowing not well assessed due to artifact and poor opacification.    Right lower extremity:    Right common femoral and proximal superficial femoral arteries demonstrate no hemodynamically significant stenosis.  Right DFA with probable multifocal high-grade narrowing/occlusions versus poor opacification.  Moderate atherosclerotic disease of the mid to distal SFA with suspected areas of moderate to high-grade narrowing difficult to assess due to poor opacification.    Right popliteal artery with suspected multifocal moderate to high-grade narrowing limited assessment due to poor opacification.    Moderate narrowing at the trifurcation.    Suspected moderate narrowing at the peroneal origin with severe atherosclerotic disease and  probable occlusions distally.    Severe narrowing at the origin of the posterior tibial artery with multifocal severe atherosclerotic disease distally limiting assessment.    Suspected high-grade narrowing at the origin of the anterior tibial artery, otherwise patent.    Left lower extremity:    Left common femoral and superficial femoral arteries are likely patent.  Left DFA with suspected multifocal high-grade narrowing/occlusions versus poor opacification.    Suspected multifocal occlusions of the distal left popliteal artery.  Trifurcation with high-grade narrowing.    Severe atherosclerotic disease of the posterior tibial artery and suspected occlusion of the origin.    Multifocal occlusions or high-grade narrowing of the peroneal artery.    Anterior tibial artery with areas of patency otherwise there is severe atherosclerotic disease with multifocal high-grade narrowing and possible short-segment occlusions.    Bilateral lower extremities demonstrate diffuse subcutaneous edema and left-sided asymmetric muscular atrophy compared to the right.  Advanced degenerative changes of the knees.    Inferior Mediastinum/Heart: Normal.    Lung Bases: No nodules or consolidation.    Peritoneal Space: No ascites. No free air.    Liver: No focal mass.    Gallbladder: Tiny suspected gas foci within the gallbladder neck lumen which may suggest stones containing gas.    Bile Ducts: No evidence of dilated ducts.    Pancreas: No mass or peripancreatic fat stranding.    Spleen: Normal.    Adrenals: Unremarkable.    Bowel/Mesentery: No evidence of bowel obstruction or inflammation.  Diverticulosis coli.    Urinary Tract: No evidence of obstructive uropathy. Kidneys enhance symmetrically.  Left renal 1.8 cm cyst.    Retroperitoneum:  No significant adenopathy.    Pelvis:  Prostatomegaly.    Thoracic/Abdominal wall:  Gynecomastia bilaterally.    Bones: Moderate degenerative changes of the spine with multilevel mild endplate  compression deformities of the lumbar spine unchanged.  Impression: Poor contrast opacification of the vasculature limits assessment.    Severe atherosclerotic disease of the vasculature.  Multifocal high-grade narrowing or occlusions of the calf vessels.  Vascular narrowing/occlusions may be overestimated due to poor opacification.    Electronically signed by resident: Jaylan Griffin  Date:    06/16/2022  Time:    15:08    Electronically signed by: Eric Carlos MD  Date:    06/16/2022  Time:    16:18  VAS Toe Brachial Indices Resting  Indication  ========    Peripheral Arterial Disease    Pressure Lower  ============    Rt brachial A syst BP  178 mmHg  Rt PTA BP  255 mmHg  Rt dors pedis A  mmHg  Rt toe BP  130 mmHg  Right MARIO ratio use:   post. tibial  Rt MARIO post tibial (rt post tib A BP / max brach A BP) 1.43  Rt MARIO (rt dors ped A BP / max brach A BP) 1.43  Rt ankle BP / max brach A BP   1.43  Rt TBI (rt toe BP / max brach A BP)    0.73  Lt PTA BP  255 mmHg  Left MARIO ratio use:    post. tibial  Lt MARIO (lt post tibial A BP / max brach A BP)  1.43  Lt ankle BP / max brach A BP   1.43    PPG Lower  =========    Rt toe BP - PPG    130 mmHg  Rt toe digit waveform: severely dampened    Impression  =========    Right Leg: Segmental pressures are unreliable due to medial calcinosis; however, PVR waveforms suggest mild peripheral arterial  occlusive disease.  Left Leg: Segmental pressures are unreliable due to medial calcinosis; however, PVR waveforms suggest mild peripheral arterial  occlusive disease. Absent DPA signal.    -Bilateral Toe PPG's and Pressures were performed.  Rt lower digits: Toe PPG waveforms as described above. - TBI of 0.73 with a Great toe pressure of 130 mmHg mmHg is noted.  Lt lower digits: History of foot amputation.    DATE OF SERVICE: 06/16/2022                                                      Sonographer: Leatha Devi RDMS, RVT  Electronically Signed by: ADAM Jauregui M.D. at  06/16/2022-10:53      Labs and Imaging within the last 24 hours listed above were reviewed.       Diet: Diet diabetic [facility]; 2000 Calorie; Cardiac (Low Na/Chol)  Significant LDAs:   IV Access Type: Peripheral  Urinary Catheter Indication if present: Patient Does Not Have Urinary Catheter  Other Lines/Tubes/Drains:    HIGH RISK CONDITION(S):   Patient has a condition that poses threat to life and bodily function: ischemic limb with need for probable amputation     Goals of Care:    Previous admission:  2/17/21  Likely prognosis:  Fair  Code Status: Full Code  Comfort Only: No  Hospice: No  Goals at discharge: remain at home, with physician follow-up    Discharge Planning:  OXANA: 6/22/2022     Code Status: Full Code   Is the patient medically ready for discharge?: No    Reason for patient still in hospital (select all that apply): Patient trending condition, Treatment, Consult recommendations, and Pending disposition  Discharge Plan A: Home with family

## 2022-06-19 NOTE — ASSESSMENT & PLAN NOTE
-Last A1c 2020, repeat ordered. Per patient, taking 68 units Lantus QHS along with metformin  mg and glipizide 10 mg daily (Possibly, he is not sure)  -Mildly hypoglycemic on admit with BG 63  -decreased dose of basal insulin to 15 units levemir, Titrate up as needed. Added prandial insulin 6/19

## 2022-06-19 NOTE — ASSESSMENT & PLAN NOTE
-PVD contributing to current non-healing foot wound. Arterial U/S shows LLE atherosclerotic disease Areas of high-grade stenosis within the L popliteal artery  -Continue home statin, ASA  -Vascular surgery consulted - patient considering surgery (BKA); seems reluctant primarily due to the time he expects it to take to obtain a prosthetic limb.

## 2022-06-19 NOTE — PROGRESS NOTES
"[facility] - Telemetry Stepdown  Infectious Disease  Progress Note    Patient Name: Saji Castañeda  MRN: [id number]  Admission Date: 6/15/2022  Length of Stay: 4 days  Attending Physician: Salma Strauss MD  Primary Care Provider: Mart Escalante MD    Isolation Status: No active isolations  Assessment/Plan:      * Osteomyelitis of left lower extremity  72 year old male with history of DM2 with peripheral neuropathy, left foot osteomyelitis s/p left  midtarsal amputation who was admitted for infected left heel ulceration. Wound was found to be malodorous with increased drainage,  Depth and exposed bone. See HPI for further detail.     6/15- blood culture- NGTD  6/15- L wound culture- + GBS and MRSA   6/15- L bone culture- "no significant isolate"    Vascular studies concerning for left lower leg arterial stenosis and calf vessel narrowing/occulsions. Tib/fib xray of the left leg notable for ulcer crater down to bone with osteomyelitis of the posterior calcaneus. Pt is on cefepime and vancomycin. ID was consulted for suspected osteomyelitis left calcaneus. Vascular surgery recommends AKA vs BKA.    Recommendations:  - Continue Vancomycin. Inpatient pharmacy to manage vanco dosing.   - Deescalate Cefepime to Ceftriaxone  - Will need to ask micro lab to work up bone culture further   - Will follow vascular surgery plans and follow cultures to guide antibiotics   - Discussed plan with ID staff. ID will follow.        Thank you for the consult. Please call for any questions.  Kimmy Morales PA-C  ID EVELINA Spectra: [id number]  Subjective:     Principal Problem:Osteomyelitis of left lower extremity    HPI: Mr. 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 Pt was seen in podiatry clinic on 6/7 and was found to chronic " nonhealing left heal with nonviable tissues, BKA was discussed, but pt was not interested. He followed up one week later on 6/15 where the wound was found to be extremely malodorous, posterior leg ulceration with increased drainage and depth. The left hellp stump lesion was found to have increased exposed bone. Therefore the pt was sent to the ED for further eval and IV abx therapy.     To note, pt has minimal ability to ambulate at baseline, uses a wheelchair for locomotion.     Since admission, pt has remained hypertensive, with an isolated fever of 100.7 today on 6/17, remains on RA. No leukocytosis noted, elevated sed rate (>120), elevated CRP (27), low albumin noted, UA without concerns for infection. Blood cultures on 6/15 NGTD, urine culture NGTD. Wound culture from left foot + streptococcus agalactiae (GBS), staph aureus, sensitives pending. Bone culture from left foot pending.     Left lower leg arterial US was concerning for significant stenosis within the popliteal artery. Mild peripheral arterial disease noted on left/right leg ABIs. CTA significant for severe arthrosclerotic disease of the vasculature, narrowing/occlusions of the calf vessels. Tib/fib xray of the left leg notable for ulcer crater down to bone with osteomyelitis of the posterior calcaneus.    Pt is on cefepime and vancomycin. ID was consulted for suspected osteomyelitis left calcaneus.           Interval History: NAEON. Afebrile. No leukocytosis. Patient refusing amputation today but would like to continue thinking about his decision. No new complaints.     Review of Systems   Constitutional:  Negative for chills, diaphoresis, fatigue and fever.   Respiratory:  Negative for cough and shortness of breath.    Cardiovascular:  Negative for chest pain, palpitations and leg swelling.   Gastrointestinal:  Negative for abdominal distention, abdominal pain, diarrhea and vomiting.   Genitourinary:  Negative for difficulty urinating.    Musculoskeletal:  Positive for gait problem. Negative for arthralgias, joint swelling and myalgias.   Skin:  Positive for wound. Negative for color change.   Neurological:  Positive for numbness. Negative for dizziness and headaches.   Psychiatric/Behavioral:  Negative for agitation and confusion. The patient is not nervous/anxious.    Objective:     Vital Signs (Most Recent):  Temp: 98.3 °F (36.8 °C) (06/19/22 1119)  Pulse: (!) 47 (06/19/22 1119)  Resp: 18 (06/19/22 1119)  BP: (!) 153/69 (06/19/22 1119)  SpO2: 95 % (06/19/22 1237)   Vital Signs (24h Range):  Temp:  [96.6 °F (35.9 °C)-98.8 °F (37.1 °C)] 98.3 °F (36.8 °C)  Pulse:  [43-82] 47  Resp:  [16-18] 18  SpO2:  [92 %-95 %] 95 %  BP: (128-162)/(60-71) 153/69     Weight: 124.3 kg (274 lb 0.5 oz)  Body mass index is 40.47 kg/m².    Estimated Creatinine Clearance: 71.4 mL/min (based on SCr of 1.2 mg/dL).    Physical Exam  Constitutional:       General: He is not in acute distress.     Appearance: He is obese. He is not ill-appearing, toxic-appearing or diaphoretic.   HENT:      Head: Normocephalic.      Nose: Nose normal.      Mouth/Throat:      Mouth: Mucous membranes are moist.      Pharynx: Oropharynx is clear.   Eyes:      Conjunctiva/sclera: Conjunctivae normal.   Cardiovascular:      Rate and Rhythm: Normal rate and regular rhythm.   Pulmonary:      Effort: Pulmonary effort is normal. No respiratory distress.      Breath sounds: Normal breath sounds.   Abdominal:      General: Abdomen is flat. There is no distension.      Palpations: Abdomen is soft.      Tenderness: There is no abdominal tenderness.   Musculoskeletal:         General: Swelling and deformity (L foot amputation) present.      Right lower leg: Edema present.      Left lower leg: Edema present.   Skin:     General: Skin is warm and dry.      Comments: Left heel wound dressed. See photo in media tab   Neurological:      Mental Status: He is alert and oriented to person, place, and time. Mental  status is at baseline.      Sensory: Sensory deficit present.      Motor: No weakness.   Psychiatric:         Mood and Affect: Mood normal.         Behavior: Behavior normal.       Significant Labs:   Microbiology Results (last 7 days)       Procedure Component Value Units Date/Time    Blood culture x two cultures. Draw prior to antibiotics. [[id number]] Collected: 06/15/22 1634    Order Status: Completed Specimen: Blood from Peripheral, Antecubital, Left Updated: 06/18/22 1812     Blood Culture, Routine No Growth to date      No Growth to date      No Growth to date      No Growth to date    Narrative:      Aerobic and anaerobic    Blood culture x two cultures. Draw prior to antibiotics. [[id number]] Collected: 06/15/22 1633    Order Status: Completed Specimen: Blood from Peripheral, Antecubital, Right Updated: 06/18/22 1812     Blood Culture, Routine No Growth to date      No Growth to date      No Growth to date      No Growth to date    Narrative:      Aerobic and anaerobic    Aerobic culture [[id number]] Collected: 06/16/22 1308    Order Status: Completed Specimen: Bone from Foot, Left Updated: 06/18/22 1214     Aerobic Bacterial Culture No significant isolate    Aerobic culture [[id number]]  (Abnormal)  (Susceptibility) Collected: 06/16/22 1305    Order Status: Completed Specimen: Wound from Foot, Left Updated: 06/18/22 1124     Aerobic Bacterial Culture STREPTOCOCCUS AGALACTIAE (GROUP B)  Moderate  Beta-hemolytic streptococci are routinely susceptible to   penicillins,cephalosporins and carbapenems.  Susceptibility testing not routinely performed        METHICILLIN RESISTANT STAPHYLOCOCCUS AUREUS  Moderate      Culture, Anaerobe [[id number]] Collected: 06/16/22 1308    Order Status: Completed Specimen: Bone from Foot, Left Updated: 06/17/22 0721     Anaerobic Culture Culture in progress    Culture, Anaerobe [[id number]] Collected: 06/16/22 1305    Order Status: Completed Specimen: Wound from Foot, Left Updated:  06/17/22 0721     Anaerobic Culture Culture in progress    Urine culture [[id number]] Collected: 06/15/22 1817    Order Status: Completed Specimen: Urine Updated: 06/16/22 2030     Urine Culture, Routine No growth    Narrative:      Specimen Source->Urine    Gram stain [[id number]] Collected: 06/16/22 1308    Order Status: Completed Specimen: Bone from Foot, Left Updated: 06/16/22 1813     Gram Stain Result No WBC's      No organisms seen    Gram stain [[id number]] Collected: 06/16/22 1305    Order Status: Completed Specimen: Wound from Foot, Left Updated: 06/16/22 1803     Gram Stain Result Rare WBC's      Rare Gram positive cocci          Recent Lab Results         06/19/22  1119   06/19/22  0709   06/18/22  2126   06/18/22  1715        POCT Glucose 341   327   269   254               Significant Imaging: I have reviewed all pertinent imaging results/findings within the past 24 hours.

## 2022-06-19 NOTE — ASSESSMENT & PLAN NOTE
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 S/p bone biopsy.  -ID consulted. Cultures growing SA and GBS

## 2022-06-19 NOTE — ASSESSMENT & PLAN NOTE
"72 year old male with history of DM2 with peripheral neuropathy, left foot osteomyelitis s/p left  midtarsal amputation who was admitted for infected left heel ulceration. Wound was found to be malodorous with increased drainage,  Depth and exposed bone. See HPI for further detail.     6/15- blood culture- NGTD  6/15- L wound culture- + GBS and MRSA   6/15- L bone culture- "no significant isolate"    Vascular studies concerning for left lower leg arterial stenosis and calf vessel narrowing/occulsions. Tib/fib xray of the left leg notable for ulcer crater down to bone with osteomyelitis of the posterior calcaneus. Pt is on cefepime and vancomycin. ID was consulted for suspected osteomyelitis left calcaneus. Vascular surgery recommends AKA vs BKA.    Recommendations:  - Continue Vancomycin. Inpatient pharmacy to manage vanco dosing.   - Deescalate Cefepime to Ceftriaxone  - Will need to ask micro lab to work up bone culture further   - Will follow vascular surgery plans and follow cultures to guide antibiotics   - Discussed plan with ID staff. ID will follow.  "

## 2022-06-19 NOTE — PROGRESS NOTES
Aaron Berg - Telemetry [facility] Medicine  Telemedicine Progress Note    Patient Name: Saji Castañeda  MRN: [id number]  Patient Class: IP- Inpatient   Admission Date: 6/15/2022  Length of Stay: 4 days  Attending Physician: Salma Strauss MD  Primary Care Provider: Mart Escalante MD      Subjective:     Principal Problem:Osteomyelitis of left lower extremity    HPI:  71 y.o. M with PMHx DM2 and PVD with chronic diabetic foot wounds s/p L sided BKA, HTN, asymptomatic bradycardia, CKD 3 and HLD who presented to the ED for worsening R foot pain and drainage. Pt. With known chronic ulceration/osteomylitis to L heel and ID has recommended BKA for definitive therapy, but the patient reports that he has not been interested in amputation. Today, he had his usual f/u appointment with ID, and the patient was referred to the ED for imaging with bone biopsy and potential abx because of worsening drainage and pain. Pt. Denies any current fevers, chills, nausea, or other systemic signs of infection.    Overview/Hospital Course:  Patient admitted to hospital medicine. Podiatry and vascular surgery consulted. Patient underwent bone biopsy and culture. Infectious disease consulted.     Telemedicine  This service was provided by Virtual Visit.    Patient was transferred to [facility] on:  6/18/2022    Chief Complaint   Patient presents with    Wound Check     Sent from podiatry and stated in pain. L toes amputated     The patient location is: [id number] A   Admitted 6/15/2022  2:10 PM  Present with the patient at the time of the telemed/virtual assessment: Telepresenter    Interval History / Events Overnight:   The patient is able to provide adequate history. Additional history was obtained from past medical records. No significant events reported by Nursing. BP elevated. Jose Cruz this AM; asymptomatic  Patient complains of nothing specific. Symptoms have been unchanged since yesterday. Associated symptoms  include: fatigue. Symptoms are stable.     Lab test(s) reviewed: hyperglycemia  Other diagnostic test reviewed EKG    Review of Systems   Constitutional:  Negative for fever.   Respiratory:  Negative for shortness of breath.      Objective:     Vital Signs (Most Recent):  Temp: 96.6 °F (35.9 °C) (06/19/22 0706)  Pulse: (!) 43 (06/19/22 0706)  Resp: 18 (06/19/22 0706)  BP: (!) 148/71 (06/19/22 0706)  SpO2: (!) 94 % (06/19/22 0706)   Vital Signs (24h Range):  Temp:  [96.6 °F (35.9 °C)-98.8 °F (37.1 °C)] 96.6 °F (35.9 °C)  Pulse:  [43-82] 43  Resp:  [16-18] 18  SpO2:  [92 %-94 %] 94 %  BP: (128-167)/(60-72) 148/71     Weight: 124.3 kg (274 lb 0.5 oz)  Body mass index is 40.47 kg/m².    Intake/Output Summary (Last 24 hours) at 6/19/2022 1026  Last data filed at 6/19/2022 0901  Gross per 24 hour   Intake 480 ml   Output 1300 ml   Net -820 ml        Physical Exam  Constitutional:       General: He is not in acute distress.     Appearance: Normal appearance.   Eyes:      General: Lids are normal. No scleral icterus.        Right eye: No discharge.         Left eye: No discharge.      Conjunctiva/sclera: Conjunctivae normal.   Neck:      Trachea: Phonation normal.   Cardiovascular:      Rate and Rhythm: Bradycardia present.      Comments: Monitor / Vital signs reviewed at time of visit  Pulmonary:      Effort: Pulmonary effort is normal. No tachypnea, accessory muscle usage or respiratory distress.   Abdominal:      General: There is no distension.   Musculoskeletal:      Left foot: Deformity (s/p partial amputation) present.   Skin:     Coloration: Skin is not cyanotic.   Neurological:      Mental Status: He is alert. He is not disoriented.   Psychiatric:         Attention and Perception: Attention normal.         Behavior: Behavior is cooperative.       Significant Labs:   Recent Labs   Lab 06/16/22  1100   HGBA1C 9.5*       Recent Labs   Lab 06/18/22  1715 06/18/22  2126 06/19/22  0709   POCTGLUCOSE 254* 269* 327*        Recent Labs   Lab 06/15/22  1633 06/16/22  1100 06/18/22  0328   WBC 6.67 4.56 4.86   HGB 10.7* 10.8* 10.3*   HCT 35.0* 36.0* 32.8*    320 315       Recent Labs   Lab 06/15/22  1633 06/16/22  1100 06/18/22  0328   GRAN 59.1  3.9 59.0  2.7 58.9  2.9   LYMPH 25.6  1.7 24.6  1.1 18.1  0.9*   MONO 9.9  0.7 10.5  0.5 15.4*  0.8   EOS 0.3 0.2 0.3       Recent Labs   Lab 06/15/22  1633 06/16/22  1100 06/18/22  0328    138 134*   K 3.9 3.7 4.3    104 98   CO2 25 24 25   BUN 10 9 13   CREATININE 1.1 0.9 1.2   GLU 68* 81 246*   CALCIUM 9.2 9.1 9.2   ALBUMIN 2.8* 2.6* 2.4*   MG 1.7  --   --    PHOS 3.2  --   --        Recent Labs   Lab 06/15/22  1633 06/16/22  1100 06/18/22  0328   ALKPHOS 87 84 73   ALT 11 7* 8*   AST 12 11 12   PROT 8.0 7.5 7.1   BILITOT 0.4 0.5 0.3   CRP 26.7*  --   --        Recent Labs   Lab 02/23/22  1407 06/15/22  1633   LACTATE  --  1.4   SEDRATE >120* >120*       SARS-CoV2 (COVID-19) Qualitative PCR (no units)   Date Value   03/29/2021 Not Detected   03/15/2021 Not Detected   03/02/2021 Not Detected   02/22/2021 Not Detected   01/29/2021 Not Detected     SARS-CoV-2 RNA, Amplification, Qual (no units)   Date Value   02/03/2021 Negative     POC Rapid COVID (no units)   Date Value   06/15/2022 Negative   02/18/2021 Negative   12/08/2020 Negative     CTA Runoff ABD PEL Bilat Lower Ext  Narrative: EXAMINATION:  CTA RUNOFF ABD PEL BILAT LOWER EXT    CLINICAL HISTORY:  Claudication or leg ischemia;Peripheral arterial disease (PAD), asymptomatic;    TECHNIQUE:  CT angiogram of the abdomen/pelvis with lower extremity runoff using xyz mL of  Omnipaque 350 IV contrast.  Multiplanar reconstructions performed.    COMPARISON:  CT abdomen pelvis 02/03/2012    FINDINGS:  Upper extremities within the gantry resulting in beam hardening artifact limiting assessment.    Abdominopelvic Vasculature:    Visualized aorta demonstrates mild calcific atherosclerosis with no convincing evidence  of dissection, aneurysm, high-grade stenosis.    Renal, superior mesenteric, and celiac arteries are patent.  GEORGIE is not well assessed due to artifact and poor opacification however appears patent with possible mild to moderate narrowing at the origin.    Common and external iliac arteries demonstrate no hemodynamically significant stenosis.  Bilateral internal iliac arteries demonstrate calcific and noncalcific atherosclerosis likely resulting in areas of moderate narrowing not well assessed due to artifact and poor opacification.    Right lower extremity:    Right common femoral and proximal superficial femoral arteries demonstrate no hemodynamically significant stenosis.  Right DFA with probable multifocal high-grade narrowing/occlusions versus poor opacification.  Moderate atherosclerotic disease of the mid to distal SFA with suspected areas of moderate to high-grade narrowing difficult to assess due to poor opacification.    Right popliteal artery with suspected multifocal moderate to high-grade narrowing limited assessment due to poor opacification.    Moderate narrowing at the trifurcation.    Suspected moderate narrowing at the peroneal origin with severe atherosclerotic disease and probable occlusions distally.    Severe narrowing at the origin of the posterior tibial artery with multifocal severe atherosclerotic disease distally limiting assessment.    Suspected high-grade narrowing at the origin of the anterior tibial artery, otherwise patent.    Left lower extremity:    Left common femoral and superficial femoral arteries are likely patent.  Left DFA with suspected multifocal high-grade narrowing/occlusions versus poor opacification.    Suspected multifocal occlusions of the distal left popliteal artery.  Trifurcation with high-grade narrowing.    Severe atherosclerotic disease of the posterior tibial artery and suspected occlusion of the origin.    Multifocal occlusions or high-grade narrowing of the  peroneal artery.    Anterior tibial artery with areas of patency otherwise there is severe atherosclerotic disease with multifocal high-grade narrowing and possible short-segment occlusions.    Bilateral lower extremities demonstrate diffuse subcutaneous edema and left-sided asymmetric muscular atrophy compared to the right.  Advanced degenerative changes of the knees.    Inferior Mediastinum/Heart: Normal.    Lung Bases: No nodules or consolidation.    Peritoneal Space: No ascites. No free air.    Liver: No focal mass.    Gallbladder: Tiny suspected gas foci within the gallbladder neck lumen which may suggest stones containing gas.    Bile Ducts: No evidence of dilated ducts.    Pancreas: No mass or peripancreatic fat stranding.    Spleen: Normal.    Adrenals: Unremarkable.    Bowel/Mesentery: No evidence of bowel obstruction or inflammation.  Diverticulosis coli.    Urinary Tract: No evidence of obstructive uropathy. Kidneys enhance symmetrically.  Left renal 1.8 cm cyst.    Retroperitoneum:  No significant adenopathy.    Pelvis:  Prostatomegaly.    Thoracic/Abdominal wall:  Gynecomastia bilaterally.    Bones: Moderate degenerative changes of the spine with multilevel mild endplate compression deformities of the lumbar spine unchanged.  Impression: Poor contrast opacification of the vasculature limits assessment.    Severe atherosclerotic disease of the vasculature.  Multifocal high-grade narrowing or occlusions of the calf vessels.  Vascular narrowing/occlusions may be overestimated due to poor opacification.    Electronically signed by resident: Jaylan Griffin  Date:    06/16/2022  Time:    15:08    Electronically signed by: Eric Carlos MD  Date:    06/16/2022  Time:    16:18  VAS Toe Brachial Indices Resting  Indication  ========    Peripheral Arterial Disease    Pressure Lower  ============    Rt brachial A syst BP  178 mmHg  Rt PTA BP  255 mmHg  Rt dors pedis A  mmHg  Rt toe BP  130 mmHg  Right MARIO  ratio use:   post. tibial  Rt MARIO post tibial (rt post tib A BP / max brach A BP) 1.43  Rt MARIO (rt dors ped A BP / max brach A BP) 1.43  Rt ankle BP / max brach A BP   1.43  Rt TBI (rt toe BP / max brach A BP)    0.73  Lt PTA BP  255 mmHg  Left MARIO ratio use:    post. tibial  Lt MARIO (lt post tibial A BP / max brach A BP)  1.43  Lt ankle BP / max brach A BP   1.43    PPG Lower  =========    Rt toe BP - PPG    130 mmHg  Rt toe digit waveform: severely dampened    Impression  =========    Right Leg: Segmental pressures are unreliable due to medial calcinosis; however, PVR waveforms suggest mild peripheral arterial  occlusive disease.  Left Leg: Segmental pressures are unreliable due to medial calcinosis; however, PVR waveforms suggest mild peripheral arterial  occlusive disease. Absent DPA signal.    -Bilateral Toe PPG's and Pressures were performed.  Rt lower digits: Toe PPG waveforms as described above. - TBI of 0.73 with a Great toe pressure of 130 mmHg mmHg is noted.  Lt lower digits: History of foot amputation.    DATE OF SERVICE: 06/16/2022                                                      Sonographer: Leatha Devi RDMS, RVT  Electronically Signed by: ADAM Jauregui M.D. at 06/16/2022-10:53      Labs and Imaging within the last 24 hours listed above were reviewed.       Diet: Diet diabetic [facility]; 2000 Calorie; Cardiac (Low Na/Chol)  Significant LDAs:   IV Access Type: Peripheral  Urinary Catheter Indication if present: Patient Does Not Have Urinary Catheter  Other Lines/Tubes/Drains:    HIGH RISK CONDITION(S):   Patient has a condition that poses threat to life and bodily function: ischemic limb with need for probable amputation     Goals of Care:    Previous admission:  2/17/21  Likely prognosis:  Fair  Code Status: Full Code  Comfort Only: No  Hospice: No  Goals at discharge: remain at home, with physician follow-up    Discharge Planning:  OXANA: 6/22/2022     Code Status: Full Code   Is the patient  medically ready for discharge?: No    Reason for patient still in hospital (select all that apply): Patient trending condition, Treatment, Consult recommendations, and Pending disposition  Discharge Plan A: Home with family       Assessment/Plan:      * Osteomyelitis of left lower extremity  -With acute on chronic osteomyleitis of LLE including heel with purulent foul smelling drainage  -Podiatry consulted, Not septic appearing. S/p bone biopsy.  -ID consulted. Cultures growing Staph aureus and GBS    Peripheral arterial disease  -PVD contributing to current non-healing foot wound. Arterial US shows LLE atherosclerotic disease, areas of high-grade stenosis within the L popliteal artery  -Continue home statin, ASA  -Vascular Surgery consulted - patient considering surgery (BKA); seems reluctant primarily due to the time he expects it to take to obtain a prosthetic limb. Wants to try conservative management with antibiotics.    Asymptomatic Mobitz (type) I (Wenckebach's) atrioventricular block  -Bradycardic in ED but asymptomatic, chronic issue  -Repeat EKG 6/19:  BPM 44 Normal sinus rhythm - AV block, 2:1 - Nonspecific T wave abnormality     Chronic kidney disease, stage III (moderate)  -sCr at baseline 1.1, no acute issues  -Continue to monitor    Type 2 diabetes, uncontrolled, with neuropathy  -Last A1c 2020, repeat > 9%. Per patient, taking 68 units Lantus QHS along with metformin  mg and glipizide 10 mg daily (Possibly, he is not sure)  -Mildly hypoglycemic on admit with BG 63  -decreased dose of basal insulin to 15 units Levemir, Titrate up as needed. Added prandial insulin 6/19  -worsening hyperglycemia as oral agents wash out. Levemir increased to BID and aspart increased with meals.    Essential hypertension  Continue losartan 100mg and HCTZ 25 mg, with hydralazine 25 mg PO q8h PRN  Added nifedipine 6/18  Consult to PharmD for med history.        Active Hospital Problems    Diagnosis  POA     *Osteomyelitis of left lower extremity [M86.9]  Yes    Foot pain, left [M79.672]  Yes    Peripheral arterial disease [I73.9]  Yes     Chronic    Asymptomatic Mobitz (type) I (Wenckebach's) atrioventricular block [I44.1]  Yes    Chronic kidney disease, stage III (moderate) [N18.30]  Yes    Type 2 diabetes, uncontrolled, with neuropathy [E11.40, E11.65]  Yes     Chronic    Essential hypertension [I10]  Yes     Chronic      Resolved Hospital Problems   No resolved problems to display.       Inpatient Medications Prescribed for Management of Current Problems:     Scheduled Meds:    aluminum-magnesium hydroxide-simethicone  30 mL Oral QID (AC & HS)    aspirin  81 mg Oral Daily    atorvastatin  80 mg Oral Daily    cefTRIAXone (ROCEPHIN) IVPB  2 g Intravenous Q24H    gabapentin  200 mg Oral BID    insulin aspart U-100  8 Units Subcutaneous TIDWM    insulin detemir U-100  15 Units Subcutaneous BID    losartan-hydrochlorothiazide 100-25 mg  1 tablet Oral Daily    NIFEdipine  30 mg Oral Daily    sucralfate  1 g Oral Q6H    tamsulosin  0.8 mg Oral Daily    vancomycin (VANCOCIN) IVPB  1,500 mg Intravenous Q12H     Continuous Infusions:   As Needed: acetaminophen, albuterol-ipratropium, dextrose 10%, dextrose 10%, glucagon (human recombinant), glucose, glucose, hydrALAZINE, insulin aspart U-100, melatonin, naloxone, ondansetron, oxyCODONE-acetaminophen, prochlorperazine, sars-cov-2 (covid-19), sodium chloride 0.9%    VTE Risk Mitigation (From admission, onward)         Ordered     IP VTE HIGH RISK PATIENT  Once         06/15/22 1957     Place sequential compression device  Until discontinued         06/15/22 1957              I have assessed these finding virtually using telemed platform and with assistance of bedside nurse     The attending portion of this evaluation, treatment, and documentation was performed per Salma Strauss MD via Telemedicine AudioVisual using the secure [employer] software platform with 2  way audio/video. The provider was located off-site and the patient is located in the hospital. The aforementioned video software was utilized to document the relevant history and physical exam    Salma Strauss MD  Department of Hospital Medicine   [facility] - Telemetry Stepdown

## 2022-06-19 NOTE — ASSESSMENT & PLAN NOTE
-PVD contributing to current non-healing foot wound. Arterial US shows LLE atherosclerotic disease, areas of high-grade stenosis within the L popliteal artery  -Continue home statin, ASA  -Vascular Surgery consulted - patient considering surgery (BKA); seems reluctant primarily due to the time he expects it to take to obtain a prosthetic limb. Wants to try conservative management with antibiotics.

## 2022-06-19 NOTE — SUBJECTIVE & OBJECTIVE
Interval History: NAEON. Afebrile. No leukocytosis. Patient refusing amputation today but would like to continue thinking about his decision. No new complaints.     Review of Systems   Constitutional:  Negative for chills, diaphoresis, fatigue and fever.   Respiratory:  Negative for cough and shortness of breath.    Cardiovascular:  Negative for chest pain, palpitations and leg swelling.   Gastrointestinal:  Negative for abdominal distention, abdominal pain, diarrhea and vomiting.   Genitourinary:  Negative for difficulty urinating.   Musculoskeletal:  Positive for gait problem. Negative for arthralgias, joint swelling and myalgias.   Skin:  Positive for wound. Negative for color change.   Neurological:  Positive for numbness. Negative for dizziness and headaches.   Psychiatric/Behavioral:  Negative for agitation and confusion. The patient is not nervous/anxious.    Objective:     Vital Signs (Most Recent):  Temp: 98.3 °F (36.8 °C) (06/19/22 1119)  Pulse: (!) 47 (06/19/22 1119)  Resp: 18 (06/19/22 1119)  BP: (!) 153/69 (06/19/22 1119)  SpO2: 95 % (06/19/22 1237)   Vital Signs (24h Range):  Temp:  [96.6 °F (35.9 °C)-98.8 °F (37.1 °C)] 98.3 °F (36.8 °C)  Pulse:  [43-82] 47  Resp:  [16-18] 18  SpO2:  [92 %-95 %] 95 %  BP: (128-162)/(60-71) 153/69     Weight: 124.3 kg (274 lb 0.5 oz)  Body mass index is 40.47 kg/m².    Estimated Creatinine Clearance: 71.4 mL/min (based on SCr of 1.2 mg/dL).    Physical Exam  Constitutional:       General: He is not in acute distress.     Appearance: He is obese. He is not ill-appearing, toxic-appearing or diaphoretic.   HENT:      Head: Normocephalic.      Nose: Nose normal.      Mouth/Throat:      Mouth: Mucous membranes are moist.      Pharynx: Oropharynx is clear.   Eyes:      Conjunctiva/sclera: Conjunctivae normal.   Cardiovascular:      Rate and Rhythm: Normal rate and regular rhythm.   Pulmonary:      Effort: Pulmonary effort is normal. No respiratory distress.      Breath  sounds: Normal breath sounds.   Abdominal:      General: Abdomen is flat. There is no distension.      Palpations: Abdomen is soft.      Tenderness: There is no abdominal tenderness.   Musculoskeletal:         General: Swelling and deformity (L foot amputation) present.      Right lower leg: Edema present.      Left lower leg: Edema present.   Skin:     General: Skin is warm and dry.      Comments: Left heel wound dressed. See photo in media tab   Neurological:      Mental Status: He is alert and oriented to person, place, and time. Mental status is at baseline.      Sensory: Sensory deficit present.      Motor: No weakness.   Psychiatric:         Mood and Affect: Mood normal.         Behavior: Behavior normal.       Significant Labs:   Microbiology Results (last 7 days)       Procedure Component Value Units Date/Time    Blood culture x two cultures. Draw prior to antibiotics. [[id number]] Collected: 06/15/22 1634    Order Status: Completed Specimen: Blood from Peripheral, Antecubital, Left Updated: 06/18/22 1812     Blood Culture, Routine No Growth to date      No Growth to date      No Growth to date      No Growth to date    Narrative:      Aerobic and anaerobic    Blood culture x two cultures. Draw prior to antibiotics. [[id number]] Collected: 06/15/22 1633    Order Status: Completed Specimen: Blood from Peripheral, Antecubital, Right Updated: 06/18/22 1812     Blood Culture, Routine No Growth to date      No Growth to date      No Growth to date      No Growth to date    Narrative:      Aerobic and anaerobic    Aerobic culture [[id number]] Collected: 06/16/22 1308    Order Status: Completed Specimen: Bone from Foot, Left Updated: 06/18/22 1214     Aerobic Bacterial Culture No significant isolate    Aerobic culture [[id number]]  (Abnormal)  (Susceptibility) Collected: 06/16/22 1305    Order Status: Completed Specimen: Wound from Foot, Left Updated: 06/18/22 1124     Aerobic Bacterial Culture STREPTOCOCCUS AGALACTIAE  (GROUP B)  Moderate  Beta-hemolytic streptococci are routinely susceptible to   penicillins,cephalosporins and carbapenems.  Susceptibility testing not routinely performed        METHICILLIN RESISTANT STAPHYLOCOCCUS AUREUS  Moderate      Culture, Anaerobe [[id number]] Collected: 06/16/22 1308    Order Status: Completed Specimen: Bone from Foot, Left Updated: 06/17/22 0721     Anaerobic Culture Culture in progress    Culture, Anaerobe [[id number]] Collected: 06/16/22 1305    Order Status: Completed Specimen: Wound from Foot, Left Updated: 06/17/22 0721     Anaerobic Culture Culture in progress    Urine culture [[id number]] Collected: 06/15/22 1817    Order Status: Completed Specimen: Urine Updated: 06/16/22 2030     Urine Culture, Routine No growth    Narrative:      Specimen Source->Urine    Gram stain [[id number]] Collected: 06/16/22 1308    Order Status: Completed Specimen: Bone from Foot, Left Updated: 06/16/22 1813     Gram Stain Result No WBC's      No organisms seen    Gram stain [[id number]] Collected: 06/16/22 1305    Order Status: Completed Specimen: Wound from Foot, Left Updated: 06/16/22 1803     Gram Stain Result Rare WBC's      Rare Gram positive cocci          Recent Lab Results         06/19/22  1119   06/19/22  0709   06/18/22  2126   06/18/22  1715        POCT Glucose 341   327   269   254               Significant Imaging: I have reviewed all pertinent imaging results/findings within the past 24 hours.

## 2022-06-19 NOTE — ASSESSMENT & PLAN NOTE
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 S/p bone biopsy.  -ID consulted. Cultures growing Staph aureus and GBS

## 2022-06-19 NOTE — ASSESSMENT & PLAN NOTE
-Last A1c 2020, repeat > 9%. Per patient, taking 68 units Lantus QHS along with metformin  mg and glipizide 10 mg daily (Possibly, he is not sure)  -Mildly hypoglycemic on admit with BG 63  -decreased dose of basal insulin to 15 units Levemir, Titrate up as needed. Added prandial insulin 6/19  -worsening hyperglycemia as oral agents wash out. Levemir increased to BID and aspart increased with meals.

## 2022-06-19 NOTE — ASSESSMENT & PLAN NOTE
Continue losartan 100mg and HCTZ 25 mg, with hydralazine 25 mg PO q8h PRN  Added nifedipine 6/18  Consult to PharmD for med history.

## 2022-06-19 NOTE — PROGRESS NOTES
[facility] - Telemetry [facility]  Telemedicine Progress Note    Patient Name: Saji Castañeda  MRN: [id number]  Patient Class: IP- Inpatient   Admission Date: 6/15/2022  Length of Stay: 3 days  Attending Physician: Salma Strauss MD  Primary Care Provider: Mart Escalante MD    Subjective:     Principal Problem:Osteomyelitis of left lower extremity    HPI:  71 y.o. M with PMHx DM2 and PVD with chronic diabetic foot wounds s/p L sided BKA, HTN, asymptomatic bradycardia, CKD 3 and HLD who presented to the ED for worsening R foot pain and drainage. Pt. With known chronic ulceration/osteomylitis to L heel and ID has recommended BKA for definitive therapy, but the patient reports that he has not been interested in amputation. Today, he had his usual f/u appointment with ID, and the patient was referred to the ED for imaging with bone biopsy and potential abx because of worsening drainage and pain. Pt. Denies any current fevers, chills, nausea, or other systemic signs of infection.    Overview/Hospital Course:  Patient admitted to hospital medicine. Podiatry and vascular surgery consulted. Patient underwent bone biopsy and culture. Infectious disease consulted.     Telemedicine  This service was provided by Virtual Visit.    Patient was transferred to [facility] on:  6/18/2022    Chief Complaint   Patient presents with    Wound Check     Sent from podiatry and stated in pain. L toes amputated     The patient location is: 8092/8092 A   Admitted 6/15/2022  2:10 PM  Present with the patient at the time of the telemed/virtual assessment: Telepresenter    Interval History / Events Overnight:   The patient is able to provide adequate history. Additional history was obtained from past medical records. No significant events reported by Nursing. BP elevated.  Patient complains of nothing specific. Symptoms have been unchanged since yesterday. Associated symptoms include: fatigue. Symptoms are  stable.     Lab test(s) reviewed: hyperglycemia    Review of Systems   Constitutional:  Negative for fever.   Respiratory:  Negative for shortness of breath.      Objective:     Vital Signs (Most Recent):  Temp: 96.6 °F (35.9 °C) (06/18/22 0713)  Pulse: (!) 53 (06/18/22 0714)  Resp: 20 (06/18/22 0714)  BP: (!) 173/74 (06/18/22 0714)  SpO2: (!) 94 % (06/18/22 0837)   Vital Signs (24h Range):  Temp:  [96.6 °F (35.9 °C)-99.2 °F (37.3 °C)] 96.6 °F (35.9 °C)  Pulse:  [53-75] 53  Resp:  [14-20] 20  SpO2:  [92 %-96 %] 94 %  BP: (136-174)/() 173/74     Weight: 124.3 kg (274 lb 0.5 oz)  Body mass index is 40.47 kg/m².    Intake/Output Summary (Last 24 hours) at 6/18/2022 1020  Last data filed at 6/18/2022 0928  Gross per 24 hour   Intake 940 ml   Output 2050 ml   Net -1110 ml      Physical Exam  Constitutional:       General: He is not in acute distress.     Appearance: Normal appearance.   Eyes:      General: Lids are normal. No scleral icterus.        Right eye: No discharge.         Left eye: No discharge.      Conjunctiva/sclera: Conjunctivae normal.   Neck:      Trachea: Phonation normal.   Cardiovascular:      Rate and Rhythm: Bradycardia present.      Comments: Monitor / Vital signs reviewed at time of visit  Pulmonary:      Effort: Pulmonary effort is normal. No tachypnea, accessory muscle usage or respiratory distress.   Abdominal:      General: There is no distension.   Musculoskeletal:      Left foot: Deformity (s/p partial amputation) present.   Skin:     Coloration: Skin is not cyanotic.   Neurological:      Mental Status: He is alert. He is not disoriented.   Psychiatric:         Attention and Perception: Attention normal.         Behavior: Behavior is cooperative.       Significant Labs:   Recent Labs   Lab 06/16/22  1100   HGBA1C 9.5*     Recent Labs   Lab 06/18/22  0714 06/18/22  1145 06/18/22  1715   POCTGLUCOSE 256* 286* 254*     Recent Labs   Lab 06/15/22  1633 06/16/22  1100 06/18/22  0328   WBC  6.67 4.56 4.86   HGB 10.7* 10.8* 10.3*   HCT 35.0* 36.0* 32.8*    320 315     Recent Labs   Lab 06/15/22  1633 06/16/22  1100 06/18/22  0328   GRAN 59.1  3.9 59.0  2.7 58.9  2.9   LYMPH 25.6  1.7 24.6  1.1 18.1  0.9*   MONO 9.9  0.7 10.5  0.5 15.4*  0.8   EOS 0.3 0.2 0.3     Recent Labs   Lab 06/15/22  1633 06/16/22  1100 06/18/22  0328    138 134*   K 3.9 3.7 4.3    104 98   CO2 25 24 25   BUN 10 9 13   CREATININE 1.1 0.9 1.2   GLU 68* 81 246*   CALCIUM 9.2 9.1 9.2   ALBUMIN 2.8* 2.6* 2.4*   MG 1.7  --   --    PHOS 3.2  --   --      Recent Labs   Lab 06/15/22  1633 06/16/22  1100 06/18/22  0328   ALKPHOS 87 84 73   ALT 11 7* 8*   AST 12 11 12   PROT 8.0 7.5 7.1   BILITOT 0.4 0.5 0.3   CRP 26.7*  --   --      Recent Labs   Lab 02/23/22  1407 06/15/22  1633   LACTATE  --  1.4   SEDRATE >120* >120*     SARS-CoV2 (COVID-19) Qualitative PCR (no units)   Date Value   03/29/2021 Not Detected   03/15/2021 Not Detected   03/02/2021 Not Detected   02/22/2021 Not Detected   01/29/2021 Not Detected     SARS-CoV-2 RNA, Amplification, Qual (no units)   Date Value   02/03/2021 Negative     POC Rapid COVID (no units)   Date Value   06/15/2022 Negative   02/18/2021 Negative   12/08/2020 Negative     CTA Runoff ABD PEL Bilat Lower Ext  Narrative: EXAMINATION:  CTA RUNOFF ABD PEL BILAT LOWER EXT    CLINICAL HISTORY:  Claudication or leg ischemia;Peripheral arterial disease (PAD), asymptomatic;    TECHNIQUE:  CT angiogram of the abdomen/pelvis with lower extremity runoff using xyz mL of  Omnipaque 350 IV contrast.  Multiplanar reconstructions performed.    COMPARISON:  CT abdomen pelvis 02/03/2012    FINDINGS:  Upper extremities within the gantry resulting in beam hardening artifact limiting assessment.    Abdominopelvic Vasculature:    Visualized aorta demonstrates mild calcific atherosclerosis with no convincing evidence of dissection, aneurysm, high-grade stenosis.    Renal, superior mesenteric, and  celiac arteries are patent.  GEORGIE is not well assessed due to artifact and poor opacification however appears patent with possible mild to moderate narrowing at the origin.    Common and external iliac arteries demonstrate no hemodynamically significant stenosis.  Bilateral internal iliac arteries demonstrate calcific and noncalcific atherosclerosis likely resulting in areas of moderate narrowing not well assessed due to artifact and poor opacification.    Right lower extremity:    Right common femoral and proximal superficial femoral arteries demonstrate no hemodynamically significant stenosis.  Right DFA with probable multifocal high-grade narrowing/occlusions versus poor opacification.  Moderate atherosclerotic disease of the mid to distal SFA with suspected areas of moderate to high-grade narrowing difficult to assess due to poor opacification.    Right popliteal artery with suspected multifocal moderate to high-grade narrowing limited assessment due to poor opacification.    Moderate narrowing at the trifurcation.    Suspected moderate narrowing at the peroneal origin with severe atherosclerotic disease and probable occlusions distally.    Severe narrowing at the origin of the posterior tibial artery with multifocal severe atherosclerotic disease distally limiting assessment.    Suspected high-grade narrowing at the origin of the anterior tibial artery, otherwise patent.    Left lower extremity:    Left common femoral and superficial femoral arteries are likely patent.  Left DFA with suspected multifocal high-grade narrowing/occlusions versus poor opacification.    Suspected multifocal occlusions of the distal left popliteal artery.  Trifurcation with high-grade narrowing.    Severe atherosclerotic disease of the posterior tibial artery and suspected occlusion of the origin.    Multifocal occlusions or high-grade narrowing of the peroneal artery.    Anterior tibial artery with areas of patency otherwise there is  severe atherosclerotic disease with multifocal high-grade narrowing and possible short-segment occlusions.    Bilateral lower extremities demonstrate diffuse subcutaneous edema and left-sided asymmetric muscular atrophy compared to the right.  Advanced degenerative changes of the knees.    Inferior Mediastinum/Heart: Normal.    Lung Bases: No nodules or consolidation.    Peritoneal Space: No ascites. No free air.    Liver: No focal mass.    Gallbladder: Tiny suspected gas foci within the gallbladder neck lumen which may suggest stones containing gas.    Bile Ducts: No evidence of dilated ducts.    Pancreas: No mass or peripancreatic fat stranding.    Spleen: Normal.    Adrenals: Unremarkable.    Bowel/Mesentery: No evidence of bowel obstruction or inflammation.  Diverticulosis coli.    Urinary Tract: No evidence of obstructive uropathy. Kidneys enhance symmetrically.  Left renal 1.8 cm cyst.    Retroperitoneum:  No significant adenopathy.    Pelvis:  Prostatomegaly.    Thoracic/Abdominal wall:  Gynecomastia bilaterally.    Bones: Moderate degenerative changes of the spine with multilevel mild endplate compression deformities of the lumbar spine unchanged.  Impression: Poor contrast opacification of the vasculature limits assessment.    Severe atherosclerotic disease of the vasculature.  Multifocal high-grade narrowing or occlusions of the calf vessels.  Vascular narrowing/occlusions may be overestimated due to poor opacification.    Electronically signed by resident: Jaylan Griffin  Date:    06/16/2022  Time:    15:08    Electronically signed by: Eric Carlos MD  Date:    06/16/2022  Time:    16:18  VAS Toe Brachial Indices Resting  Indication  ========    Peripheral Arterial Disease    Pressure Lower  ============    Rt brachial A syst BP  178 mmHg  Rt PTA BP  255 mmHg  Rt dors pedis A  mmHg  Rt toe BP  130 mmHg  Right MARIO ratio use:   post. tibial  Rt MARIO post tibial (rt post tib A BP / max brach A BP)  1.43  Rt MARIO (rt dors ped A BP / max brach A BP) 1.43  Rt ankle BP / max brach A BP   1.43  Rt TBI (rt toe BP / max brach A BP)    0.73  Lt PTA BP  255 mmHg  Left MARIO ratio use:    post. tibial  Lt MARIO (lt post tibial A BP / max brach A BP)  1.43  Lt ankle BP / max brach A BP   1.43    PPG Lower  =========    Rt toe BP - PPG    130 mmHg  Rt toe digit waveform: severely dampened    Impression  =========    Right Leg: Segmental pressures are unreliable due to medial calcinosis; however, PVR waveforms suggest mild peripheral arterial  occlusive disease.  Left Leg: Segmental pressures are unreliable due to medial calcinosis; however, PVR waveforms suggest mild peripheral arterial  occlusive disease. Absent DPA signal.    -Bilateral Toe PPG's and Pressures were performed.  Rt lower digits: Toe PPG waveforms as described above. - TBI of 0.73 with a Great toe pressure of 130 mmHg mmHg is noted.  Lt lower digits: History of foot amputation.    DATE OF SERVICE: 06/16/2022                                                      Sonographer: Leatha Devi RDMS, RVT  Electronically Signed by: ADAM Jauregui M.D. at 06/16/2022-10:53      Labs and Imaging within the last 24 hours listed above were reviewed.       Diet: Diet diabetic [facility]; 2000 Calorie; Cardiac (Low Na/Chol)  Significant LDAs:   IV Access Type: Peripheral  Urinary Catheter Indication if present: Patient Does Not Have Urinary Catheter  Other Lines/Tubes/Drains:    HIGH RISK CONDITION(S):   Patient has a condition that poses threat to life and bodily function: ischemic limb with need for probable amputation     Goals of Care:    Previous admission:  2/17/21  Likely prognosis:  Fair  Code Status: Full Code  Comfort Only: No  Hospice: No  Goals at discharge: remain at home, with physician follow-up    Discharge Planning:  OXANA: 6/20/2022     Code Status: Full Code   Is the patient medically ready for discharge?: No    Reason for patient still in hospital  (select all that apply): Patient trending condition, Treatment, Consult recommendations, and Pending disposition  Discharge Plan A: Home with family       Assessment/Plan:      * Osteomyelitis of left lower extremity  -With acute on chronic osteomyleitis of LLE including heel with purulent foul smelling drainage  -Podiatry consulted, Not septic appearing. S/p bone biopsy.  -ID consulted. Cultures growing SA and GBS    Peripheral arterial disease  -PVD contributing to current non-healing foot wound. Arterial U/S shows LLE atherosclerotic disease Areas of high-grade stenosis within the L popliteal artery  -Continue home statin, ASA  -Vascular surgery consulted - patient considering surgery (BKA); seems reluctant primarily due to the time he expects it to take to obtain a prosthetic limb.    Asymptomatic Mobitz (type) I (Wenckebach's) atrioventricular block  -Bradycardic in ED but asymptomatic, chronic issue    Chronic kidney disease, stage III (moderate)  -Cr at baseline 1.1, no acute issues  -Continue to monitor    Type 2 diabetes, uncontrolled, with neuropathy  -Last A1c 2020, repeat ordered. Per patient, taking 68 units Lantus QHS along with metformin  mg and glipizide 10 mg daily (Possibly, he is not sure)  -Mildly hypoglycemic on admit with BG 63  -decreased dose of basal insulin to 15 units levemir, Titrate up as needed. Added prandial insulin 6/19    Essential hypertension  Continue losartan 100mg and HCTZ 25 mg, with hydralazine 25 mg PO q8h PRN  Added nifedipine 6/18  Consult to PharmD for med history.        Active Hospital Problems    Diagnosis  POA    *Osteomyelitis of left lower extremity [M86.9]  Yes    Foot pain, left [M79.672]  Yes    Peripheral arterial disease [I73.9]  Yes     Chronic    Asymptomatic Mobitz (type) I (Wenckebach's) atrioventricular block [I44.1]  Yes    Chronic kidney disease, stage III (moderate) [N18.30]  Yes    Type 2 diabetes, uncontrolled, with neuropathy [E11.40,  E11.65]  Yes     Chronic    Essential hypertension [I10]  Yes     Chronic      Resolved Hospital Problems   No resolved problems to display.       Inpatient Medications Prescribed for Management of Current Problems:     Scheduled Meds:    aluminum-magnesium hydroxide-simethicone  30 mL Oral QID (AC & HS)    aspirin  81 mg Oral Daily    atorvastatin  80 mg Oral Daily    ceFEPime (MAXIPIME) IVPB  2 g Intravenous Q8H    gabapentin  200 mg Oral BID    [START ON 6/19/2022] insulin aspart U-100  4 Units Subcutaneous TIDWM    insulin detemir U-100  15 Units Subcutaneous QHS    losartan-hydrochlorothiazide 100-25 mg  1 tablet Oral Daily    NIFEdipine  30 mg Oral Daily    sucralfate  1 g Oral Q6H    tamsulosin  0.8 mg Oral Daily    vancomycin (VANCOCIN) IVPB  1,500 mg Intravenous Q12H     Continuous Infusions:   As Needed: acetaminophen, albuterol-ipratropium, dextrose 10%, dextrose 10%, glucagon (human recombinant), glucose, glucose, hydrALAZINE, insulin aspart U-100, melatonin, naloxone, ondansetron, oxyCODONE-acetaminophen, prochlorperazine, sars-cov-2 (covid-19), sodium chloride 0.9%    VTE Risk Mitigation (From admission, onward)         Ordered     IP VTE HIGH RISK PATIENT  Once         06/15/22 1957     Place sequential compression device  Until discontinued         06/15/22 1957              I have assessed these finding virtually using telemed platform and with assistance of bedside nurse     The attending portion of this evaluation, treatment, and documentation was performed per Salma Strauss MD via Telemedicine AudioVisual using the secure [employer] software platform with 2 way audio/video. The provider was located off-site and the patient is located in the hospital. The aforementioned video software was utilized to document the relevant history and physical exam    Salma Strauss MD  Department of Hospital Medicine   [facility] - Telemetry Stepdown

## 2022-06-20 PROBLEM — N28.9 ACUTE RENAL INSUFFICIENCY: Status: ACTIVE | Noted: 2022-06-20

## 2022-06-20 LAB
ALBUMIN SERPL BCP-MCNC: 2.4 G/DL (ref 3.5–5.2)
ALP SERPL-CCNC: 69 U/L (ref 55–135)
ALT SERPL W/O P-5'-P-CCNC: 9 U/L (ref 10–44)
ANION GAP SERPL CALC-SCNC: 9 MMOL/L (ref 8–16)
AST SERPL-CCNC: 14 U/L (ref 10–40)
BACTERIA BLD CULT: NORMAL
BACTERIA BLD CULT: NORMAL
BACTERIA SPEC ANAEROBE CULT: ABNORMAL
BACTERIA SPEC ANAEROBE CULT: ABNORMAL
BACTERIA SPEC ANAEROBE CULT: NORMAL
BASOPHILS # BLD AUTO: 0.03 K/UL (ref 0–0.2)
BASOPHILS NFR BLD: 0.5 % (ref 0–1.9)
BILIRUB SERPL-MCNC: 0.3 MG/DL (ref 0.1–1)
BUN SERPL-MCNC: 20 MG/DL (ref 8–23)
CALCIUM SERPL-MCNC: 9.3 MG/DL (ref 8.7–10.5)
CHLORIDE SERPL-SCNC: 98 MMOL/L (ref 95–110)
CO2 SERPL-SCNC: 28 MMOL/L (ref 23–29)
CREAT SERPL-MCNC: 1.4 MG/DL (ref 0.5–1.4)
DIFFERENTIAL METHOD: ABNORMAL
EOSINOPHIL # BLD AUTO: 0.5 K/UL (ref 0–0.5)
EOSINOPHIL NFR BLD: 7.9 % (ref 0–8)
ERYTHROCYTE [DISTWIDTH] IN BLOOD BY AUTOMATED COUNT: 16.5 % (ref 11.5–14.5)
EST. GFR  (AFRICAN AMERICAN): 57.2 ML/MIN/1.73 M^2
EST. GFR  (NON AFRICAN AMERICAN): 49.5 ML/MIN/1.73 M^2
GLUCOSE SERPL-MCNC: 281 MG/DL (ref 70–110)
HCT VFR BLD AUTO: 33.4 % (ref 40–54)
HGB BLD-MCNC: 10.4 G/DL (ref 14–18)
IMM GRANULOCYTES # BLD AUTO: 0.01 K/UL (ref 0–0.04)
IMM GRANULOCYTES NFR BLD AUTO: 0.2 % (ref 0–0.5)
LYMPHOCYTES # BLD AUTO: 1 K/UL (ref 1–4.8)
LYMPHOCYTES NFR BLD: 16.6 % (ref 18–48)
MCH RBC QN AUTO: 25.5 PG (ref 27–31)
MCHC RBC AUTO-ENTMCNC: 31.1 G/DL (ref 32–36)
MCV RBC AUTO: 82 FL (ref 82–98)
MONOCYTES # BLD AUTO: 0.7 K/UL (ref 0.3–1)
MONOCYTES NFR BLD: 11.7 % (ref 4–15)
NEUTROPHILS # BLD AUTO: 3.9 K/UL (ref 1.8–7.7)
NEUTROPHILS NFR BLD: 63.1 % (ref 38–73)
NRBC BLD-RTO: 0 /100 WBC
PLATELET # BLD AUTO: 342 K/UL (ref 150–450)
PMV BLD AUTO: 9.6 FL (ref 9.2–12.9)
POCT GLUCOSE: 235 MG/DL (ref 70–110)
POCT GLUCOSE: 293 MG/DL (ref 70–110)
POCT GLUCOSE: 357 MG/DL (ref 70–110)
POTASSIUM SERPL-SCNC: 4.3 MMOL/L (ref 3.5–5.1)
PROT SERPL-MCNC: 7 G/DL (ref 6–8.4)
RBC # BLD AUTO: 4.08 M/UL (ref 4.6–6.2)
SODIUM SERPL-SCNC: 135 MMOL/L (ref 136–145)
VANCOMYCIN TROUGH SERPL-MCNC: 29.7 UG/ML (ref 10–22)
WBC # BLD AUTO: 6.22 K/UL (ref 3.9–12.7)

## 2022-06-20 PROCEDURE — 97530 THERAPEUTIC ACTIVITIES: CPT

## 2022-06-20 PROCEDURE — 80053 COMPREHEN METABOLIC PANEL: CPT | Performed by: STUDENT IN AN ORGANIZED HEALTH CARE EDUCATION/TRAINING PROGRAM

## 2022-06-20 PROCEDURE — 80202 ASSAY OF VANCOMYCIN: CPT | Performed by: INTERNAL MEDICINE

## 2022-06-20 PROCEDURE — 27000207 HC ISOLATION

## 2022-06-20 PROCEDURE — 25000003 PHARM REV CODE 250: Performed by: REGISTERED NURSE

## 2022-06-20 PROCEDURE — 63600175 PHARM REV CODE 636 W HCPCS: Performed by: PHYSICIAN ASSISTANT

## 2022-06-20 PROCEDURE — 99232 PR SUBSEQUENT HOSPITAL CARE,LEVL II: ICD-10-PCS | Mod: ,,, | Performed by: SURGERY

## 2022-06-20 PROCEDURE — 99233 SBSQ HOSP IP/OBS HIGH 50: CPT | Mod: 95,,, | Performed by: INTERNAL MEDICINE

## 2022-06-20 PROCEDURE — 97162 PT EVAL MOD COMPLEX 30 MIN: CPT

## 2022-06-20 PROCEDURE — 25000003 PHARM REV CODE 250: Performed by: HOSPITALIST

## 2022-06-20 PROCEDURE — 20600001 HC STEP DOWN PRIVATE ROOM

## 2022-06-20 PROCEDURE — 85025 COMPLETE CBC W/AUTO DIFF WBC: CPT | Performed by: STUDENT IN AN ORGANIZED HEALTH CARE EDUCATION/TRAINING PROGRAM

## 2022-06-20 PROCEDURE — 99232 SBSQ HOSP IP/OBS MODERATE 35: CPT | Mod: ,,, | Performed by: SURGERY

## 2022-06-20 PROCEDURE — 63600175 PHARM REV CODE 636 W HCPCS: Performed by: INTERNAL MEDICINE

## 2022-06-20 PROCEDURE — 25000003 PHARM REV CODE 250: Performed by: INTERNAL MEDICINE

## 2022-06-20 PROCEDURE — 99233 PR SUBSEQUENT HOSPITAL CARE,LEVL III: ICD-10-PCS | Mod: 95,,, | Performed by: INTERNAL MEDICINE

## 2022-06-20 RX ORDER — ASCORBIC ACID 500 MG
500 TABLET ORAL 2 TIMES DAILY
Status: ON HOLD | COMMUNITY

## 2022-06-20 RX ORDER — METRONIDAZOLE 500 MG/1
500 TABLET ORAL EVERY 8 HOURS
Status: DISCONTINUED | OUTPATIENT
Start: 2022-06-20 | End: 2022-07-01

## 2022-06-20 RX ORDER — NIFEDIPINE 30 MG/1
60 TABLET, EXTENDED RELEASE ORAL DAILY
Status: DISCONTINUED | OUTPATIENT
Start: 2022-06-21 | End: 2022-06-21

## 2022-06-20 RX ORDER — ACETAMINOPHEN 500 MG
1000 TABLET ORAL EVERY 8 HOURS PRN
Status: ON HOLD | COMMUNITY
End: 2023-03-15 | Stop reason: HOSPADM

## 2022-06-20 RX ORDER — TAMSULOSIN HYDROCHLORIDE 0.4 MG/1
0.4 CAPSULE ORAL DAILY
Status: DISCONTINUED | OUTPATIENT
Start: 2022-06-21 | End: 2022-07-08 | Stop reason: HOSPADM

## 2022-06-20 RX ORDER — TAMSULOSIN HYDROCHLORIDE 0.4 MG/1
0.4 CAPSULE ORAL DAILY
Start: 2022-06-20 | End: 2023-03-09 | Stop reason: SDUPTHER

## 2022-06-20 RX ORDER — SODIUM CHLORIDE 9 MG/ML
INJECTION, SOLUTION INTRAVENOUS CONTINUOUS
Status: ACTIVE | OUTPATIENT
Start: 2022-06-20 | End: 2022-06-21

## 2022-06-20 RX ORDER — INSULIN ASPART 100 [IU]/ML
10 INJECTION, SOLUTION INTRAVENOUS; SUBCUTANEOUS
Status: DISCONTINUED | OUTPATIENT
Start: 2022-06-20 | End: 2022-06-21

## 2022-06-20 RX ADMIN — ATORVASTATIN CALCIUM 80 MG: 20 TABLET, FILM COATED ORAL at 08:06

## 2022-06-20 RX ADMIN — CEFTRIAXONE 2 G: 2 INJECTION, SOLUTION INTRAVENOUS at 12:06

## 2022-06-20 RX ADMIN — NIFEDIPINE 30 MG: 30 TABLET, FILM COATED, EXTENDED RELEASE ORAL at 08:06

## 2022-06-20 RX ADMIN — SUCRALFATE 1 G: 1 SUSPENSION ORAL at 05:06

## 2022-06-20 RX ADMIN — ALUMINUM HYDROXIDE, MAGNESIUM HYDROXIDE, AND SIMETHICONE 30 ML: 200; 200; 20 SUSPENSION ORAL at 12:06

## 2022-06-20 RX ADMIN — GABAPENTIN 200 MG: 100 CAPSULE ORAL at 08:06

## 2022-06-20 RX ADMIN — METRONIDAZOLE 500 MG: 500 TABLET ORAL at 02:06

## 2022-06-20 RX ADMIN — INSULIN ASPART 8 UNITS: 100 INJECTION, SOLUTION INTRAVENOUS; SUBCUTANEOUS at 08:06

## 2022-06-20 RX ADMIN — ALUMINUM HYDROXIDE, MAGNESIUM HYDROXIDE, AND SIMETHICONE 30 ML: 200; 200; 20 SUSPENSION ORAL at 09:06

## 2022-06-20 RX ADMIN — METRONIDAZOLE 500 MG: 500 TABLET ORAL at 09:06

## 2022-06-20 RX ADMIN — SODIUM CHLORIDE: 0.9 INJECTION, SOLUTION INTRAVENOUS at 09:06

## 2022-06-20 RX ADMIN — SUCRALFATE 1 G: 1 SUSPENSION ORAL at 12:06

## 2022-06-20 RX ADMIN — ALUMINUM HYDROXIDE, MAGNESIUM HYDROXIDE, AND SIMETHICONE 30 ML: 200; 200; 20 SUSPENSION ORAL at 03:06

## 2022-06-20 RX ADMIN — INSULIN ASPART 2 UNITS: 100 INJECTION, SOLUTION INTRAVENOUS; SUBCUTANEOUS at 05:06

## 2022-06-20 RX ADMIN — VANCOMYCIN HYDROCHLORIDE 1500 MG: 1.5 INJECTION, POWDER, LYOPHILIZED, FOR SOLUTION INTRAVENOUS at 06:06

## 2022-06-20 RX ADMIN — GABAPENTIN 200 MG: 100 CAPSULE ORAL at 09:06

## 2022-06-20 RX ADMIN — LOSARTAN POTASSIUM AND HYDROCHLOROTHIAZIDE 1 TABLET: 100; 25 TABLET, FILM COATED ORAL at 08:06

## 2022-06-20 RX ADMIN — ASPIRIN 81 MG: 81 TABLET, COATED ORAL at 08:06

## 2022-06-20 RX ADMIN — SUCRALFATE 1 G: 1 SUSPENSION ORAL at 06:06

## 2022-06-20 RX ADMIN — INSULIN ASPART 1 UNITS: 100 INJECTION, SOLUTION INTRAVENOUS; SUBCUTANEOUS at 09:06

## 2022-06-20 RX ADMIN — INSULIN ASPART 4 UNITS: 100 INJECTION, SOLUTION INTRAVENOUS; SUBCUTANEOUS at 08:06

## 2022-06-20 RX ADMIN — TAMSULOSIN HYDROCHLORIDE 0.8 MG: 0.4 CAPSULE ORAL at 08:06

## 2022-06-20 RX ADMIN — INSULIN ASPART 3 UNITS: 100 INJECTION, SOLUTION INTRAVENOUS; SUBCUTANEOUS at 12:06

## 2022-06-20 RX ADMIN — INSULIN ASPART 10 UNITS: 100 INJECTION, SOLUTION INTRAVENOUS; SUBCUTANEOUS at 05:06

## 2022-06-20 RX ADMIN — INSULIN ASPART 10 UNITS: 100 INJECTION, SOLUTION INTRAVENOUS; SUBCUTANEOUS at 12:06

## 2022-06-20 RX ADMIN — ALUMINUM HYDROXIDE, MAGNESIUM HYDROXIDE, AND SIMETHICONE 30 ML: 200; 200; 20 SUSPENSION ORAL at 06:06

## 2022-06-20 RX ADMIN — HYDRALAZINE HYDROCHLORIDE 25 MG: 25 TABLET, FILM COATED ORAL at 03:06

## 2022-06-20 RX ADMIN — SUCRALFATE 1 G: 1 SUSPENSION ORAL at 11:06

## 2022-06-20 NOTE — SUBJECTIVE & OBJECTIVE
"Medications Prior to Admission   Medication Sig Dispense Refill Last Dose    acetaminophen (TYLENOL) 325 MG tablet Take 2 tablets (650 mg total) by mouth every 6 (six) hours as needed for Pain.  0     aspirin (ECOTRIN) 81 MG EC tablet Take 81 mg by mouth once daily.       BD ULTRA-FINE ADITYA PEN NEEDLE 32 gauge x 5/32" Ndle USE FOUR TIMES DAILY WITH MEALS AND NIGHTLY 100 each 0     blood sugar diagnostic (CONTOUR TEST STRIPS) Strp Inject 1 strip into the skin 2 (two) times daily before meals. 100 strip 6     ELIQUIS 5 mg Tab        gabapentin (NEURONTIN) 100 MG capsule Take 2 capsules (200 mg total) by mouth 2 (two) times daily. 120 capsule 1     glipiZIDE (GLUCOTROL) 10 MG tablet Take 10 mg by mouth daily with breakfast. HOLD THIS MEDICATION IF YOU ARE SKIPPING A MEAL       hydroCHLOROthiazide (HYDRODIURIL) 25 MG tablet        insulin aspart U-100 (NOVOLOG) 100 unit/mL (3 mL) InPn pen Inject 0-5 Units into the skin before meals and at bedtime as needed (Hyperglycemia).  0     insulin detemir U-100 (LEVEMIR FLEXTOUCH) 100 unit/mL (3 mL) SubQ InPn pen Inject 55 Units into the skin every evening.  0     isosorbide-hydrALAZINE 20-37.5 mg (BIDIL) 20-37.5 mg Tab Take 1 tablet by mouth 3 (three) times daily. 90 tablet 2     ketoconazole (NIZORAL) 2 % cream Apply topically once daily. 60 g 1     LANTUS SOLOSTAR U-100 INSULIN glargine 100 units/mL (3mL) SubQ pen 68 Units every evening.       losartan (COZAAR) 100 MG tablet        losartan-hydrochlorothiazide 100-25 mg (HYZAAR) 100-25 mg per tablet Take 1 tablet by mouth once daily.       melatonin (MELATIN) 3 mg tablet Take 2 tablets (6 mg total) by mouth nightly as needed for Insomnia.  0     metFORMIN (GLUCOPHAGE-XR) 500 MG ER 24hr tablet        MICROLET LANCET Misc   0     multivitamin Tab Take 1 tablet by mouth once daily.       multivitamin with minerals tablet 1 tablet 2 TIMES DAILY (route: oral)       pantoprazole (PROTONIX) 40 MG tablet Take 40 mg by mouth once " daily.       polyethylene glycol (GLYCOLAX) 17 gram PwPk Take 17 g by mouth once daily.  0     rosuvastatin (CRESTOR) 40 MG Tab Take 40 mg by mouth once daily.       senna (SENOKOT) 8.6 mg tablet Take 1 tablet by mouth 2 (two) times daily.       tamsulosin (FLOMAX) 0.4 mg Cap Take 2 capsules (0.8 mg total) by mouth once daily. 90 capsule 3     triamcinolone acetonide 0.1% (KENALOG) 0.1 % cream Apply topically 2 (two) times daily. Apply to affected area twice daily as directed. 453.6 g 0     VITAMIN C WITH CARLOS HIPS 500 MG tablet Take 500 mg by mouth 2 (two) times daily.       VITAMIN D2 1,250 mcg (50,000 unit) capsule Take 50,000 Units by mouth every 7 days.       zinc sulfate (ZINCATE) 220 (50) mg capsule Take 1 capsule (220 mg total) by mouth once daily.          Review of patient's allergies indicates:  No Known Allergies    Past Medical History:   Diagnosis Date    Arthritis     legs    Diabetes mellitus     Diabetes mellitus, type 2     Hyperlipidemia     Osteomyelitis      Past Surgical History:   Procedure Laterality Date    ANGIOGRAPHY OF LOWER EXTREMITY N/A 2/3/2021    Procedure: Angiogram Extremity Bilateral;  Surgeon: Ernst Chacko MD;  Location: [facility] OR [address] [place] [address];  Service: Peripheral Vascular;  Laterality: N/A;  7.4 mintues fluoroscopy time  816.15 mGy  170.17 Gycm2    AORTOGRAPHY WITH EXTREMITY RUNOFF Bilateral 2/3/2021    Procedure: AORTOGRAM, WITH EXTREMITY RUNOFF;  Surgeon: Ernst Chacko MD;  Location: [facility] OR [address] [place] [address];  Service: Peripheral Vascular;  Laterality: Bilateral;    DEBRIDEMENT OF FOOT Left 2/23/2021    Procedure: DEBRIDEMENT, LEFT HEEL;  Surgeon: Mayra Schroeder DPM;  Location: [facility] OR [address] [place] [address];  Service: Podiatry;  Laterality: Left;    FOOT AMPUTATION  October 2010    left high midfoot amputation     Family History       Problem Relation (Age of Onset)    Cancer Brother    Diabetes Mother, Sister    Heart disease Mother    Stroke Sister          Tobacco Use    Smoking  status: Never Smoker    Smokeless tobacco: Never Used   Substance and Sexual Activity    Alcohol use: No     Comment: occassional    Drug use: No    Sexual activity: Not Currently     Review of Systems   All other systems reviewed and are negative.  Objective:     Vital Signs (Most Recent):  Temp: 98 °F (36.7 °C) (06/20/22 0728)  Pulse: 67 (06/20/22 0728)  Resp: 20 (06/20/22 0728)  BP: (!) 170/77 (06/20/22 0728)  SpO2: (!) 94 % (06/20/22 0728)   Vital Signs (24h Range):  Temp:  [98 °F (36.7 °C)-98.9 °F (37.2 °C)] 98 °F (36.7 °C)  Pulse:  [47-77] 67  Resp:  [16-25] 20  SpO2:  [92 %-96 %] 94 %  BP: (153-186)/(69-77) 170/77     Weight: 124.3 kg (274 lb 0.5 oz)  Body mass index is 40.47 kg/m².    Physical Exam  Vitals reviewed.   Constitutional:       General: He is not in acute distress.     Appearance: He is well-developed.   HENT:      Head: Normocephalic and atraumatic.   Eyes:      Extraocular Movements: Extraocular movements intact.      Pupils: Pupils are equal, round, and reactive to light.   Neck:      Vascular: No JVD.      Trachea: No tracheal deviation.   Cardiovascular:      Rate and Rhythm: Regular rhythm. Bradycardia present.      Pulses:           Dorsalis pedis pulses are 0 on the right side and 0 on the left side.      Heart sounds: No murmur heard.    No friction rub. No gallop.   Pulmonary:      Effort: No respiratory distress.      Breath sounds: Normal breath sounds. No wheezing or rales.   Abdominal:      General: Bowel sounds are normal. There is no distension.      Palpations: Abdomen is soft. There is no mass.      Tenderness: There is no abdominal tenderness.   Musculoskeletal:         General: No deformity.      Cervical back: Neck supple.      Comments: Ulcer in the LLE stump, non healing   Chronic skin changes    Lymphadenopathy:      Cervical: No cervical adenopathy.   Skin:     General: Skin is warm and dry.      Findings: No rash.   Neurological:      Mental Status: He is alert and  oriented to person, place, and time.           Significant Labs:  All pertinent labs from the last 24 hours have been reviewed.    Significant Diagnostics:  I have reviewed all pertinent imaging results/findings within the past 24 hours.

## 2022-06-20 NOTE — PHARMACY MED REC
"  Admission Medication History     The home medication history was taken by Kylie Rand.    You may go to "Admission" then "Reconcile Home Medications" tabs to review and/or act upon these items.      The home medication list has been updated by the Pharmacy department.    Please read ALL comments highlighted in yellow.    Please address this information as you see fit.     Feel free to contact us if you have any questions or require assistance.      The medications listed below were removed from the home medication list. Please reorder if appropriate:  Patient reports no longer taking the following medication(s):   APIXABAN 5MG   INSULIN ASPART U-100   INSULIN DETEMIR U-100   ISOSORBIDE-HYDRALAZINE 20-37.5MG   KETOCONAZOLE 2% CREAM   LOSARTAN 100MG   LOSARTAN-HYDROCHLOROTHIAZIDE 100-25MG   MELATONIN 3MG   MULTIVITAMIN   MULTIVITAMIN WITH MINERALS   PANTOPRAZOLE 40MG   POLYETHYLENE GLYCOL 17 GRAM   SENNA 8.6MG   TRIAMCINOLONE ACETONIDE 0.1%   VITAMIN D2 1,250 MCG   ZINC SULFATE 220MG    Medications listed below were obtained from: Patient/family  PTA Medications   Medication Sig    acetaminophen (TYLENOL) 500 MG tablet   Take 1,000 mg by mouth every 8 (eight) hours as needed for Pain.    ascorbic acid, vitamin C, (VITAMIN C) 500 MG tablet   Take 500 mg by mouth once daily.    aspirin (ECOTRIN) 81 MG EC tablet   Take 81 mg by mouth once daily.    gabapentin (NEURONTIN) 100 MG capsule   Take 100 mg by mouth once daily.    glipiZIDE (GLUCOTROL) 10 MG tablet   Take 10 mg by mouth once daily. HOLD THIS MEDICATION IF YOU ARE SKIPPING A MEAL    hydroCHLOROthiazide (HYDRODIURIL) 25 MG tablet   Take 25 mg by mouth once daily.    LANTUS SOLOSTAR U-100 INSULIN glargine 100 units/mL (3mL) SubQ pen   Inject 68 Units into the skin every evening.    metFORMIN (GLUCOPHAGE-XR) 500 MG ER 24hr tablet   Take 500 mg by mouth once daily.    rosuvastatin (CRESTOR) 40 MG Tab   Take 40 mg by mouth once daily. " "   tamsulosin (FLOMAX) 0.4 mg Cap   Take 0.4 mg by mouth once daily.    BD ULTRA-FINE ADITYA PEN NEEDLE 32 gauge x 5/32" Ndle   USE FOUR TIMES DAILY WITH MEALS AND NIGHTLY    blood sugar diagnostic (CONTOUR TEST STRIPS) Strp   Inject 1 strip into the skin 2 (two) times daily before meals.    MICROLET LANCET Misc        Potential issues to be addressed PRIOR TO DISCHARGE  Please discuss with the patient barriers to adherence with medication treatment plans  Patient requires education regarding drug therapies     Kylie Rand  [phone]                .          "

## 2022-06-20 NOTE — PLAN OF CARE
PT evaluation complete - see note for details. POC and goals established.    Problem: Physical Therapy  Goal: Physical Therapy Goal  Description: Goals to be met by: 22     Patient will increase functional independence with mobility by performin. Supine to sit with Modified Startex  2. Sit to stand transfer with Supervision  3. Bed to chair transfer with Stand-by Assistance using LRAD  4. Gait  x 10 feet with Stand-by Assistance using LRAD    Outcome: Ongoing, Progressing     2022

## 2022-06-20 NOTE — PLAN OF CARE
Pt not seen today. Chart reviewed. L foot wound anaerobic culture + Prevotella and Porphyromonas Somerae. PO flagyl added. Left foot bone culture + staph species, will continue IV ceftriaxone and vancomycin. Plan reviewed with ID staff, Dr. Collins.

## 2022-06-20 NOTE — SUBJECTIVE & OBJECTIVE
Telemedicine  This service was provided by Virtual Visit.    Patient was transferred to [facility] on:  6/18/2022    Chief Complaint   Patient presents with    Wound Check     Sent from podiatry and stated in pain. L toes amputated     The patient location is: 8092/8092 A   Admitted 6/15/2022  2:10 PM  Present with the patient at the time of the telemed/virtual assessment: Telepresenter    Interval History / Events Overnight:   The patient is able to provide adequate history. Additional history was obtained from past medical records. No significant events reported by Nursing. BP elevated before AM medications. Pharmacy medication history reviewed.  Patient complains of nothing specific. Symptoms have been unchanged since yesterday. Associated symptoms include: fatigue. Symptoms are stable.     Lab test(s) reviewed: hyperglycemia, vancomycin level elevated.    Review of Systems   Constitutional:  Negative for fever.   Respiratory:  Negative for shortness of breath.      Objective:     Vital Signs (Most Recent):  Temp: 98 °F (36.7 °C) (06/20/22 0728)  Pulse: 62 (06/20/22 1041)  Resp: 17 (06/20/22 1041)  BP: (!) 170/77 (06/20/22 0728)  SpO2: (!) 94 % (06/20/22 0728)   Vital Signs (24h Range):  Temp:  [98 °F (36.7 °C)-98.9 °F (37.2 °C)] 98 °F (36.7 °C)  Pulse:  [53-85] 62  Resp:  [16-25] 17  SpO2:  [92 %-96 %] 94 %  BP: (158-186)/(74-77) 170/77     Weight: 124.3 kg (274 lb 0.5 oz)  Body mass index is 40.47 kg/m².    Intake/Output Summary (Last 24 hours) at 6/20/2022 1130  Last data filed at 6/20/2022 0923  Gross per 24 hour   Intake 320 ml   Output 700 ml   Net -380 ml        Physical Exam  Constitutional:       General: He is not in acute distress.     Appearance: Normal appearance.   Eyes:      General: Lids are normal. No scleral icterus.        Right eye: No discharge.         Left eye: No discharge.      Conjunctiva/sclera: Conjunctivae normal.   Neck:      Trachea: Phonation normal.    Cardiovascular:      Rate and Rhythm: Bradycardia present.      Comments: Monitor / Vital signs reviewed at time of visit  Pulmonary:      Effort: Pulmonary effort is normal. No tachypnea, accessory muscle usage or respiratory distress.   Abdominal:      General: There is no distension.   Musculoskeletal:      Left foot: Deformity (s/p partial amputation) present.   Skin:     Coloration: Skin is not cyanotic.   Neurological:      Mental Status: He is alert. He is not disoriented.   Psychiatric:         Attention and Perception: Attention normal.         Behavior: Behavior is cooperative.       Significant Labs:   Recent Labs   Lab 06/16/22  1100   HGBA1C 9.5*       Recent Labs   Lab 06/19/22  1649 06/19/22  2200 06/20/22  0823   POCTGLUCOSE 152* 224* 357*       Recent Labs   Lab 06/16/22  1100 06/18/22  0328 06/20/22  0425   WBC 4.56 4.86 6.22   HGB 10.8* 10.3* 10.4*   HCT 36.0* 32.8* 33.4*    315 342       Recent Labs   Lab 06/16/22  1100 06/18/22 0328 06/20/22  0425   GRAN 59.0  2.7 58.9  2.9 63.1  3.9   LYMPH 24.6  1.1 18.1  0.9* 16.6*  1.0   MONO 10.5  0.5 15.4*  0.8 11.7  0.7   EOS 0.2 0.3 0.5       Recent Labs   Lab 06/15/22  1633 06/16/22  1100 06/18/22  0328 06/20/22  0425    138 134* 135*   K 3.9 3.7 4.3 4.3    104 98 98   CO2 25 24 25 28   BUN 10 9 13 20   CREATININE 1.1 0.9 1.2 1.4   GLU 68* 81 246* 281*   CALCIUM 9.2 9.1 9.2 9.3   ALBUMIN 2.8* 2.6* 2.4* 2.4*   MG 1.7  --   --   --    PHOS 3.2  --   --   --        Recent Labs   Lab 06/15/22  1633 06/16/22  1100 06/18/22  0328 06/20/22  0425   ALKPHOS 87 84 73 69   ALT 11 7* 8* 9*   AST 12 11 12 14   PROT 8.0 7.5 7.1 7.0   BILITOT 0.4 0.5 0.3 0.3   CRP 26.7*  --   --   --        Recent Labs   Lab 02/23/22  1407 06/15/22  1633   LACTATE  --  1.4   SEDRATE >120* >120*       SARS-CoV2 (COVID-19) Qualitative PCR (no units)   Date Value   03/29/2021 Not Detected   03/15/2021 Not Detected   03/02/2021 Not Detected   02/22/2021 Not  Detected   01/29/2021 Not Detected     SARS-CoV-2 RNA, Amplification, Qual (no units)   Date Value   02/03/2021 Negative     POC Rapid COVID (no units)   Date Value   06/15/2022 Negative   02/18/2021 Negative   12/08/2020 Negative     CTA Runoff ABD PEL Bilat Lower Ext  Narrative: EXAMINATION:  CTA RUNOFF ABD PEL BILAT LOWER EXT    CLINICAL HISTORY:  Claudication or leg ischemia;Peripheral arterial disease (PAD), asymptomatic;    TECHNIQUE:  CT angiogram of the abdomen/pelvis with lower extremity runoff using xyz mL of  Omnipaque 350 IV contrast.  Multiplanar reconstructions performed.    COMPARISON:  CT abdomen pelvis 02/03/2012    FINDINGS:  Upper extremities within the gantry resulting in beam hardening artifact limiting assessment.    Abdominopelvic Vasculature:    Visualized aorta demonstrates mild calcific atherosclerosis with no convincing evidence of dissection, aneurysm, high-grade stenosis.    Renal, superior mesenteric, and celiac arteries are patent.  GEORGIE is not well assessed due to artifact and poor opacification however appears patent with possible mild to moderate narrowing at the origin.    Common and external iliac arteries demonstrate no hemodynamically significant stenosis.  Bilateral internal iliac arteries demonstrate calcific and noncalcific atherosclerosis likely resulting in areas of moderate narrowing not well assessed due to artifact and poor opacification.    Right lower extremity:    Right common femoral and proximal superficial femoral arteries demonstrate no hemodynamically significant stenosis.  Right DFA with probable multifocal high-grade narrowing/occlusions versus poor opacification.  Moderate atherosclerotic disease of the mid to distal SFA with suspected areas of moderate to high-grade narrowing difficult to assess due to poor opacification.    Right popliteal artery with suspected multifocal moderate to high-grade narrowing limited assessment due to poor  opacification.    Moderate narrowing at the trifurcation.    Suspected moderate narrowing at the peroneal origin with severe atherosclerotic disease and probable occlusions distally.    Severe narrowing at the origin of the posterior tibial artery with multifocal severe atherosclerotic disease distally limiting assessment.    Suspected high-grade narrowing at the origin of the anterior tibial artery, otherwise patent.    Left lower extremity:    Left common femoral and superficial femoral arteries are likely patent.  Left DFA with suspected multifocal high-grade narrowing/occlusions versus poor opacification.    Suspected multifocal occlusions of the distal left popliteal artery.  Trifurcation with high-grade narrowing.    Severe atherosclerotic disease of the posterior tibial artery and suspected occlusion of the origin.    Multifocal occlusions or high-grade narrowing of the peroneal artery.    Anterior tibial artery with areas of patency otherwise there is severe atherosclerotic disease with multifocal high-grade narrowing and possible short-segment occlusions.    Bilateral lower extremities demonstrate diffuse subcutaneous edema and left-sided asymmetric muscular atrophy compared to the right.  Advanced degenerative changes of the knees.    Inferior Mediastinum/Heart: Normal.    Lung Bases: No nodules or consolidation.    Peritoneal Space: No ascites. No free air.    Liver: No focal mass.    Gallbladder: Tiny suspected gas foci within the gallbladder neck lumen which may suggest stones containing gas.    Bile Ducts: No evidence of dilated ducts.    Pancreas: No mass or peripancreatic fat stranding.    Spleen: Normal.    Adrenals: Unremarkable.    Bowel/Mesentery: No evidence of bowel obstruction or inflammation.  Diverticulosis coli.    Urinary Tract: No evidence of obstructive uropathy. Kidneys enhance symmetrically.  Left renal 1.8 cm cyst.    Retroperitoneum:  No significant adenopathy.    Pelvis:   Prostatomegaly.    Thoracic/Abdominal wall:  Gynecomastia bilaterally.    Bones: Moderate degenerative changes of the spine with multilevel mild endplate compression deformities of the lumbar spine unchanged.  Impression: Poor contrast opacification of the vasculature limits assessment.    Severe atherosclerotic disease of the vasculature.  Multifocal high-grade narrowing or occlusions of the calf vessels.  Vascular narrowing/occlusions may be overestimated due to poor opacification.    Electronically signed by resident: Jaylan Griffin  Date:    06/16/2022  Time:    15:08    Electronically signed by: Eric Carlos MD  Date:    06/16/2022  Time:    16:18  VAS Toe Brachial Indices Resting  Indication  ========    Peripheral Arterial Disease    Pressure Lower  ============    Rt brachial A syst BP  178 mmHg  Rt PTA BP  255 mmHg  Rt dors pedis A  mmHg  Rt toe BP  130 mmHg  Right MARIO ratio use:   post. tibial  Rt MARIO post tibial (rt post tib A BP / max brach A BP) 1.43  Rt MARIO (rt dors ped A BP / max brach A BP) 1.43  Rt ankle BP / max brach A BP   1.43  Rt TBI (rt toe BP / max brach A BP)    0.73  Lt PTA BP  255 mmHg  Left MARIO ratio use:    post. tibial  Lt MARIO (lt post tibial A BP / max brach A BP)  1.43  Lt ankle BP / max brach A BP   1.43    PPG Lower  =========    Rt toe BP - PPG    130 mmHg  Rt toe digit waveform: severely dampened    Impression  =========    Right Leg: Segmental pressures are unreliable due to medial calcinosis; however, PVR waveforms suggest mild peripheral arterial  occlusive disease.  Left Leg: Segmental pressures are unreliable due to medial calcinosis; however, PVR waveforms suggest mild peripheral arterial  occlusive disease. Absent DPA signal.    -Bilateral Toe PPG's and Pressures were performed.  Rt lower digits: Toe PPG waveforms as described above. - TBI of 0.73 with a Great toe pressure of 130 mmHg mmHg is noted.  Lt lower digits: History of foot amputation.    DATE OF SERVICE:  06/16/2022                                                      Sonographer: Leatha Devi RDMS, RVT  Electronically Signed by: ADAM Jauregui M.D. at 06/16/2022-10:53      Labs and Imaging within the last 24 hours listed above were reviewed.       Diet: Diet diabetic [facility]; 2000 Calorie; Cardiac (Low Na/Chol)  Significant LDAs:   IV Access Type: Peripheral  Urinary Catheter Indication if present: Patient Does Not Have Urinary Catheter  Other Lines/Tubes/Drains:    HIGH RISK CONDITION(S):   Patient has a condition that poses threat to life and bodily function: ischemic limb likely in need of amputation     Goals of Care:    Previous admission:  2/17/21  Likely prognosis:  Fair  Code Status: Full Code  Comfort Only: No  Hospice: No  Goals at discharge: remain at home, with physician follow-up    Discharge Planning:  OXANA: 6/22/2022     Code Status: Full Code   Is the patient medically ready for discharge?: No    Reason for patient still in hospital (select all that apply): Patient trending condition, Treatment, Consult recommendations, and Pending disposition  Discharge Plan A: Home with family

## 2022-06-20 NOTE — CONSULTS
PharmD Consult received for:    1.) Admit medication history/reconciliation:  · Completed; please see the pharmacy med rec note documented by Kylie Rand on 06/20/2022 for the updated medication list.     Potentially problematic discrepancies with current MAR  Patient is taking a drug DIFFERENTLY than how ordered upon admit  Tamsulosin 0.4 mg PO daily (home regimen); inpatient order- 0.8 mg daily     Pharmacy will sign-off, please re-consult as needed.      Thank you for the consult,  Sarahy Abebe, PharmD, BCPS  [id number]     **Note: Consults are reviewed Monday-Friday 7:00am-3:30pm. The above recommendations are only suggested. The recommendations should be considered in conjunction with all patient factors.**

## 2022-06-20 NOTE — ASSESSMENT & PLAN NOTE
Saji Castañeda is a 70 y.o. male who has a past medical history of Arthritis, Diabetes mellitus, Diabetes mellitus, type 2, Hyperlipidemia, and Osteomyelitis, chronic lymphedema with chronic bilateral lower extremity wounds who presents with worsening and nonhealing left lower extremity wounds:      -- Patient still refusing any amputation at this time   -- CTA shows left Popliteal occlusions and distal SFA disease, tibials appear patent but heavily calcified  -- Angiogram from 2/2021: Tortuous but non-diseased aortoiliac system, Left Femoral popliteal segments without disease, Left tibial vessels without disease, 3 vessel runoff to foot, R femoral popliteal segments without disease, and R 2 vessel runoff to foot.  -- S/P bone biopsy with podiatry

## 2022-06-20 NOTE — PLAN OF CARE
Problem: Diabetes Comorbidity  Goal: Blood Glucose Level Within Targeted Range  Outcome: Ongoing, Progressing  Intervention: Monitor and Manage Glycemia  Flowsheets (Taken 6/20/2022 0652)  Glycemic Management:   blood glucose monitored   insulin dose matched to carbohydrate intake   supplemental insulin given     Problem: Impaired Wound Healing  Goal: Optimal Wound Healing  Outcome: Ongoing, Progressing  Intervention: Promote Wound Healing  Flowsheets (Taken 6/20/2022 0652)  Oral Nutrition Promotion:   rest periods promoted   safe use of adaptive equipment encouraged  Activity Management:   Rolling - L1   Arm raise - L1     Problem: Skin Injury Risk Increased  Goal: Skin Health and Integrity  Outcome: Ongoing, Progressing  Intervention: Optimize Skin Protection  Flowsheets (Taken 6/20/2022 0652)  Pressure Reduction Techniques:   frequent weight shift encouraged   weight shift assistance provided  Pressure Reduction Devices:   foam padding utilized   heel offloading device utilized  Skin Protection:   skin sealant/moisture barrier applied   tubing/devices free from skin contact  Head of Bed (HOB) Positioning: HOB elevated  Intervention: Promote and Optimize Oral Intake  Flowsheets (Taken 6/20/2022 0652)  Oral Nutrition Promotion:   rest periods promoted   safe use of adaptive equipment encouraged     POC discussed with fito

## 2022-06-20 NOTE — PT/OT/SLP EVAL
Physical Therapy Evaluation and Treatment     Patient Name:  Saji Castañeda  MRN: [id number]    Admit Date: 6/15/2022  Admitting Diagnosis:  Osteomyelitis of left lower extremity  Length of Stay: 5 days  Recent Surgery: * No surgery found *      Recommendations:     Discharge Recommendations: Skilled Nursing Facility   Equipment recommendations: rolling walker and bedside commode  Barriers to discharge: Decreased caregiver support available  and Increased level of skilled assistance required    Assessment:     Saji Castañeda is a 72 y.o. male [place] [facility] [place] with medical diagnosis of Osteomyelitis of left lower extremity. Pt presents with weakness, impaired endurance, impaired sensation, impaired self care skills, impaired functional mobilty, gait instability, impaired balance, orthopedic precautions, decreased lower extremity function. These deficits effect their roles and responsibilities in which they were able to complete prior to admit. Pt agreeable to therapy session. PTA, pt was able to t/f to and from his w/c (I). Saji Castañeda would benefit from acute PT intervention to improve quality of life, focus on recovery of impairments, provide patient/caregiver education, reduce fall risk, and maximize (I) and safety with functional mobility. Once medically stable, recommending pt discharge to Skilled Nursing Facility .      Rehab Prognosis: Good    Plan:     During this hospitalization, patient to be seen 3 x/week to address the identified rehab impairments via gait training, therapeutic activities, therapeutic exercises, neuromuscular re-education and progress towards stated goals.     Plan of Care Expires:  07/20/22  Plan of Care reviewed with: patient    This plan of care has been discussed with the patient/caregiver, who was included in its development and is in agreement with the identified goals and treatment plan.     Subjective     Communicated with RN prior to session.  Patient found supine  "upon PT entry to room, agreeable to evaluation. Pt alone during session.    Chief Complaint: none stated      Patient/Family Comments/goals: "I would really like to be able to walk again"    Pain/Comfort:  · Pain Rating 1: 0/10  · Pain Rating Post-Intervention 1: 0/10    Patients cultural, spiritual, Hoahaoism conflicts given the current situation: None identified     Patient History: information obtained from pt     Living Environment: Pt lives with 2 sisters in single level home  with 3 LON (joe HR, can reach both at same time). Bathroom set-up: tub/shower combo. Pt states that he spongebathes himself in wc  Prior Level of Function: modified (I) for mobility and ADLs using w/c as AD   DME owned: standard walker and wheelchair  Support Available/Caregiver Assistance: pt and his sisters are home 24/7. pt states one sister is currently hospitalized at [facility].   Occupation: pt is not currently working  Driving: pt does not drive    Objective:     Patient found with: telemetry, peripheral IV, PRAFO    Recent Surgery: * No surgery found *    General Precautions: Standard, fall, contact   Orthopedic Precautions:LLE partial weight bearing (to L heel for t/f only)   Braces: N/A   Oxygen Device: room air      Exams:     Cognition:  · Oriented X 4  · Command following: Follows multistep verbal commands  · Communication: clear/fluent     Sensation:   o Light touch sensation: Impaired LLE      Gross Motor Coordination: No deficits noted during functional mobility tasks      Edema/Skin Integrity: None noted; Dry; pt was picking at dry skin, encouraged him not to in order to prevent infection     Postural examination/scapula alignment: Rounded shoulder and Head forward     Lower Extremity Range of Motion:  o Right Lower Extremity: WFL  o Left Lower Extremity: WFL     Lower Extremity Strength:  o Right Lower Extremity: grossly 4+/5  o Left Lower Extremity: grossly 4+/5    Functional Mobility:    Bed Mobility: pt prefers to " get out on the R side of the bed as this is how he does it at home  · Scooting with stand by assistance  · Supine > Sit with minimum assistance  · Assistance with trunk  · Sit > Supine with contact guard assistance    Transfers:   · Sit <> Stand Transfer:  ·  Minimal Assistance x 1 trials from eob with RW AD   · CGA x1 trial from eob with RW  · Bed <> Chair: deferred 2/2 pt had bowel movement while standing             Gait:  · deferred    Balance:  · Dynamic Sitting: FAIR+: Maintains balance through MINIMAL excursions of active trunk motion  · Standing:  · Static: FAIR: Maintains without assist but unable to take challenges   · x2 trials of 1 minute each for static standing trials with RW  · V/c for pt to keep LLE PWB to heel during trials    Outcome Measure: AM-PAC 6 CLICK MOBILITY  Total Score:15     Patient/Caregiver Education:     Therapist educated pt/caregiver regarding:    PT POC and goals for therapy    Safety with mobility and fall risk    Instruction on use of call button and importance of calling nursing staff for assistance with mobility    Time provided for therapeutic counseling and discussion of current health disposition. All questions answered to satisfaction, within scope of PT practice     Patient/caregiver able to verbalize understanding and expressed no further questions this visit; will follow-up with pt/caregiver during current admit for additional questions/concerns within scope of practice.     White board updated.     Patient left supine with all lines intact, call button in reach and PCT notified re: soiled sheets.    GOALS:   Multidisciplinary Problems     Physical Therapy Goals        Problem: Physical Therapy    Goal Priority Disciplines Outcome Goal Variances Interventions   Physical Therapy Goal     PT, PT/OT Ongoing, Progressing     Description: Goals to be met by: 22     Patient will increase functional independence with mobility by performin. Supine to sit with  Modified [place]  2. Sit to stand transfer with Supervision  3. Bed to chair transfer with Stand-by Assistance using LRAD  4. Gait  x 10 feet with Stand-by Assistance using LRAD                       History:     Past Medical History:   Diagnosis Date    Arthritis     legs    Diabetes mellitus     Diabetes mellitus, type 2     Hyperlipidemia     Osteomyelitis        Past Surgical History:   Procedure Laterality Date    ANGIOGRAPHY OF LOWER EXTREMITY N/A 2/3/2021    Procedure: Angiogram Extremity Bilateral;  Surgeon: Ernst Chacko MD;  Location: [facility] OR [address] [place] [address];  Service: Peripheral Vascular;  Laterality: N/A;  7.4 mintues fluoroscopy time  816.15 mGy  170.17 Gycm2    AORTOGRAPHY WITH EXTREMITY RUNOFF Bilateral 2/3/2021    Procedure: AORTOGRAM, WITH EXTREMITY RUNOFF;  Surgeon: Ernst Chacko MD;  Location: [facility] OR [address] [place] [address];  Service: Peripheral Vascular;  Laterality: Bilateral;    DEBRIDEMENT OF FOOT Left 2/23/2021    Procedure: DEBRIDEMENT, LEFT HEEL;  Surgeon: Mayra Schroeder DPM;  Location: [facility] OR [address] [place] [address];  Service: Podiatry;  Laterality: Left;    FOOT AMPUTATION  October 2010    left high midfoot amputation       Time Tracking:     PT Received On: 06/20/22  PT Start Time: 1624     PT Stop Time: 1657  PT Total Time (min): 33 min     Billable Minutes: Evaluation 10 and Therapeutic Activity 23 06/20/2022

## 2022-06-20 NOTE — PLAN OF CARE
SW met with  pt to discuss discharge plan and recommendations.  SW [facility] with patient [facility]erral sent to [facility].  SW sent referral for IV ABX to [facility].  Pt reported he has limited family support at home to provide help.  QUINCY advised pt we will follow up with him once teaching is completed.        06/20/22 1535   Post-Acute Status   Post-Acute Authorization Home Health;IV Infusion   Home Health Status Referrals Sent   IV Infusion Status Referral(s) sent   Discharge Delays None known at this time   Discharge Plan   Discharge Plan A Home Health;Home;Other  (IV ABX pending)   Discharge Plan B Home;Home Health;Other  (IV ABX)     Leonor Levine LMSW  PRN-  [facility]  Ext. [phone]

## 2022-06-20 NOTE — PROGRESS NOTES
"[facility] - Telemetry Stepdown  Vascular Surgery  Progress Note    Patient Name: Saji Castañeda  MRN: [id number]  Admission Date: 6/15/2022  Primary Care Provider: Mart Escalante MD    Subjective:     Interval History:   No acute events overnight   Still refusing an amputation    Post-Op Info:  * No surgery found *         Medications Prior to Admission   Medication Sig Dispense Refill Last Dose    acetaminophen (TYLENOL) 325 MG tablet Take 2 tablets (650 mg total) by mouth every 6 (six) hours as needed for Pain.  0     aspirin (ECOTRIN) 81 MG EC tablet Take 81 mg by mouth once daily.       BD ULTRA-FINE ADITYA PEN NEEDLE 32 gauge x 5/32" Ndle USE FOUR TIMES DAILY WITH MEALS AND NIGHTLY 100 each 0     blood sugar diagnostic (CONTOUR TEST STRIPS) Strp Inject 1 strip into the skin 2 (two) times daily before meals. 100 strip 6     ELIQUIS 5 mg Tab        gabapentin (NEURONTIN) 100 MG capsule Take 2 capsules (200 mg total) by mouth 2 (two) times daily. 120 capsule 1     glipiZIDE (GLUCOTROL) 10 MG tablet Take 10 mg by mouth daily with breakfast. HOLD THIS MEDICATION IF YOU ARE SKIPPING A MEAL       hydroCHLOROthiazide (HYDRODIURIL) 25 MG tablet        insulin aspart U-100 (NOVOLOG) 100 unit/mL (3 mL) InPn pen Inject 0-5 Units into the skin before meals and at bedtime as needed (Hyperglycemia).  0     insulin detemir U-100 (LEVEMIR FLEXTOUCH) 100 unit/mL (3 mL) SubQ InPn pen Inject 55 Units into the skin every evening.  0     isosorbide-hydrALAZINE 20-37.5 mg (BIDIL) 20-37.5 mg Tab Take 1 tablet by mouth 3 (three) times daily. 90 tablet 2     ketoconazole (NIZORAL) 2 % cream Apply topically once daily. 60 g 1     LANTUS SOLOSTAR U-100 INSULIN glargine 100 units/mL (3mL) SubQ pen 68 Units every evening.       losartan (COZAAR) 100 MG tablet        losartan-hydrochlorothiazide 100-25 mg (HYZAAR) 100-25 mg per tablet Take 1 tablet by mouth once daily.       melatonin (MELATIN) 3 mg tablet Take 2 tablets (6 mg " total) by mouth nightly as needed for Insomnia.  0     metFORMIN (GLUCOPHAGE-XR) 500 MG ER 24hr tablet        MICROLET LANCET Misc   0     multivitamin Tab Take 1 tablet by mouth once daily.       multivitamin with minerals tablet 1 tablet 2 TIMES DAILY (route: oral)       pantoprazole (PROTONIX) 40 MG tablet Take 40 mg by mouth once daily.       polyethylene glycol (GLYCOLAX) 17 gram PwPk Take 17 g by mouth once daily.  0     rosuvastatin (CRESTOR) 40 MG Tab Take 40 mg by mouth once daily.       senna (SENOKOT) 8.6 mg tablet Take 1 tablet by mouth 2 (two) times daily.       tamsulosin (FLOMAX) 0.4 mg Cap Take 2 capsules (0.8 mg total) by mouth once daily. 90 capsule 3     triamcinolone acetonide 0.1% (KENALOG) 0.1 % cream Apply topically 2 (two) times daily. Apply to affected area twice daily as directed. 453.6 g 0     VITAMIN C WITH CARLOS HIPS 500 MG tablet Take 500 mg by mouth 2 (two) times daily.       VITAMIN D2 1,250 mcg (50,000 unit) capsule Take 50,000 Units by mouth every 7 days.       zinc sulfate (ZINCATE) 220 (50) mg capsule Take 1 capsule (220 mg total) by mouth once daily.          Review of patient's allergies indicates:  No Known Allergies    Past Medical History:   Diagnosis Date    Arthritis     legs    Diabetes mellitus     Diabetes mellitus, type 2     Hyperlipidemia     Osteomyelitis      Past Surgical History:   Procedure Laterality Date    ANGIOGRAPHY OF LOWER EXTREMITY N/A 2/3/2021    Procedure: Angiogram Extremity Bilateral;  Surgeon: Ernst Chacko MD;  Location: [address];  Service: Peripheral Vascular;  Laterality: N/A;  7.4 mintues fluoroscopy time  816.15 mGy  170.17 Gycm2    AORTOGRAPHY WITH EXTREMITY RUNOFF Bilateral 2/3/2021    Procedure: AORTOGRAM, WITH EXTREMITY RUNOFF;  Surgeon: Ernst Chacko MD;  Location: [facility] OR [address] [place] [address];  Service: Peripheral Vascular;  Laterality: Bilateral;    DEBRIDEMENT OF FOOT Left 2/23/2021    Procedure: DEBRIDEMENT,  LEFT HEEL;  Surgeon: Mayra Schroeder DPM;  Location: [address];  Service: Podiatry;  Laterality: Left;    FOOT AMPUTATION  October 2010    left high midfoot amputation     Family History       Problem Relation (Age of Onset)    Cancer Brother    Diabetes Mother, Sister    Heart disease Mother    Stroke Sister          Tobacco Use    Smoking status: Never Smoker    Smokeless tobacco: Never Used   Substance and Sexual Activity    Alcohol use: No     Comment: occassional    Drug use: No    Sexual activity: Not Currently     Review of Systems   All other systems reviewed and are negative.  Objective:     Vital Signs (Most Recent):  Temp: 98 °F (36.7 °C) (06/20/22 0728)  Pulse: 67 (06/20/22 0728)  Resp: 20 (06/20/22 0728)  BP: (!) 170/77 (06/20/22 0728)  SpO2: (!) 94 % (06/20/22 0728)   Vital Signs (24h Range):  Temp:  [98 °F (36.7 °C)-98.9 °F (37.2 °C)] 98 °F (36.7 °C)  Pulse:  [47-77] 67  Resp:  [16-25] 20  SpO2:  [92 %-96 %] 94 %  BP: (153-186)/(69-77) 170/77     Weight: 124.3 kg (274 lb 0.5 oz)  Body mass index is 40.47 kg/m².    Physical Exam  Vitals reviewed.   Constitutional:       General: He is not in acute distress.     Appearance: He is well-developed.   HENT:      Head: Normocephalic and atraumatic.   Eyes:      Extraocular Movements: Extraocular movements intact.      Pupils: Pupils are equal, round, and reactive to light.   Neck:      Vascular: No JVD.      Trachea: No tracheal deviation.   Cardiovascular:      Rate and Rhythm: Regular rhythm. Bradycardia present.      Pulses:           Dorsalis pedis pulses are 0 on the right side and 0 on the left side.      Heart sounds: No murmur heard.    No friction rub. No gallop.   Pulmonary:      Effort: No respiratory distress.      Breath sounds: Normal breath sounds. No wheezing or rales.   Abdominal:      General: Bowel sounds are normal. There is no distension.      Palpations: Abdomen is soft. There is no mass.      Tenderness: There is no  abdominal tenderness.   Musculoskeletal:         General: No deformity.      Cervical back: Neck supple.      Comments: Ulcer in the LLE stump, non healing   Chronic skin changes    Lymphadenopathy:      Cervical: No cervical adenopathy.   Skin:     General: Skin is warm and dry.      Findings: No rash.   Neurological:      Mental Status: He is alert and oriented to person, place, and time.           Significant Labs:  All pertinent labs from the last 24 hours have been reviewed.    Significant Diagnostics:  I have reviewed all pertinent imaging results/findings within the past 24 hours.    Assessment/Plan:     Peripheral arterial disease  Saji Castañeda is a 70 y.o. male who has a past medical history of Arthritis, Diabetes mellitus, Diabetes mellitus, type 2, Hyperlipidemia, and Osteomyelitis, chronic lymphedema with chronic bilateral lower extremity wounds who presents with worsening and nonhealing left lower extremity wounds:      -- Patient still refusing any amputation at this time   -- CTA shows left Popliteal occlusions and distal SFA disease, tibials appear patent but heavily calcified  -- Angiogram from 2/2021: Tortuous but non-diseased aortoiliac system, Left Femoral popliteal segments without disease, Left tibial vessels without disease, 3 vessel runoff to foot, R femoral popliteal segments without disease, and R 2 vessel runoff to foot.  -- S/P bone biopsy with podiatry            Jonathan Hayden MD  Vascular Surgery  [facility] - Telemetry Stepdown

## 2022-06-20 NOTE — PLAN OF CARE
Problem: Adult Inpatient Plan of Care  Goal: Plan of Care Review  Outcome: Ongoing, Progressing  Goal: Patient-Specific Goal (Individualized)  Outcome: Ongoing, Progressing  Goal: Absence of Hospital-Acquired Illness or Injury  Outcome: Ongoing, Progressing  Goal: Optimal Comfort and Wellbeing  Outcome: Ongoing, Progressing  POC reviewed with pt. A&Ox4. Room air. PRN medication administered for hypertension. No acute changes. Safety checks performed. Bed in lowest position. Wheels locked. Call light in reach. Will continue to monitor.

## 2022-06-20 NOTE — CONSULTS
Pharmacokinetic Assessment Follow Up: IV Vancomycin    Vancomycin serum concentration assessment/plan:   Trough resulted as 29.7 mcg/mL (~9 hour level); Goal 15-20 mcg/mL, Bone/joint Infection    Patient meets criteria for YNES and Serum creatinine increased from 1.2 to 1.4 mg/dL   Hold Vancomycin today; re-dose when random level is less than 20 mcg/mL   Next level to be drawn on 6/21 at 0430    Drug levels (last 3 results):  Recent Labs   Lab Result Units 06/18/22 0328 06/20/22  0425   Vancomycin-Trough ug/mL 23.0* 29.7*       Pharmacy will continue to follow and monitor vancomycin.    Please contact pharmacy at extension [phone] for questions regarding this assessment.    Thank you for the consult,   Sarahy Abebe     Patient brief summary:  Saji Castañeda is a 72 y.o. male initiated on antimicrobial therapy with IV Vancomycin for treatment of bone/joint infection    The patient's current regimen is 1500 mg IV q12h    Drug Allergies:   Review of patient's allergies indicates:  No Known Allergies    Actual Body Weight:   124.3 kg    Renal Function:   Estimated Creatinine Clearance: 61.2 mL/min (based on SCr of 1.4 mg/dL).,     Dialysis Method (if applicable):  N/A    CBC (last 72 hours):  Recent Labs   Lab Result Units 06/18/22 0328 06/20/22  0425   WBC K/uL 4.86 6.22   Hemoglobin g/dL 10.3* 10.4*   Hematocrit % 32.8* 33.4*   Platelets K/uL 315 342   Gran % % 58.9 63.1   Lymph % % 18.1 16.6*   Mono % % 15.4* 11.7   Eosinophil % % 7.0 7.9   Basophil % % 0.4 0.5   Differential Method  Automated Automated       Metabolic Panel (last 72 hours):  Recent Labs   Lab Result Units 06/18/22 0328 06/20/22  0425   Sodium mmol/L 134* 135*   Potassium mmol/L 4.3 4.3   Chloride mmol/L 98 98   CO2 mmol/L 25 28   Glucose mg/dL 246* 281*   BUN mg/dL 13 20   Creatinine mg/dL 1.2 1.4   Albumin g/dL 2.4* 2.4*   Total Bilirubin mg/dL 0.3 0.3   Alkaline Phosphatase U/L 73 69   AST U/L 12 14   ALT U/L 8* 9*       Vancomycin  Administrations:  vancomycin given in the last 96 hours                   vancomycin 1.5 g in dextrose 5 % 250 mL IVPB (ready to mix) (mg) 1,500 mg New Bag 06/20/22 0642     1,500 mg New Bag 06/19/22 1944     1,500 mg New Bag  0629     1,500 mg New Bag 06/18/22 1827    vancomycin 1.75 g in 5 % dextrose 500 mL IVPB (mg) 1,750 mg New Bag 06/18/22 0630     1,750 mg New Bag 06/17/22 1734     1,750 mg New Bag  0619    vancomycin 2 g in dextrose 5 % 500 mL IVPB (mg) 2,000 mg New Bag 06/16/22 1825                Microbiologic Results:  Microbiology Results (last 7 days)     Procedure Component Value Units Date/Time    Aerobic culture [[id number]]  (Abnormal) Collected: 06/16/22 1308    Order Status: Completed Specimen: Bone from Foot, Left Updated: 06/20/22 1234     Aerobic Bacterial Culture STAPHYLOCOCCUS SPECIES  Rare  Identification and susceptibility pending      Culture, Anaerobe [[id number]]  (Abnormal) Collected: 06/16/22 1305    Order Status: Completed Specimen: Wound from Foot, Left Updated: 06/20/22 1209     Anaerobic Culture PRESUMPTIVE PREVOTELLA/PORPHYROMONAS GROUP  Rare        PORPHYROMONAS SOMERAE  Rare      Culture, Anaerobe [[id number]] Collected: 06/16/22 1308    Order Status: Completed Specimen: Bone from Foot, Left Updated: 06/20/22 0736     Anaerobic Culture No anaerobes isolated    Blood culture x two cultures. Draw prior to antibiotics. [[id number]] Collected: 06/15/22 1633    Order Status: Completed Specimen: Blood from Peripheral, Antecubital, Right Updated: 06/19/22 1812     Blood Culture, Routine No Growth to date      No Growth to date      No Growth to date      No Growth to date      No Growth to date    Narrative:      Aerobic and anaerobic    Blood culture x two cultures. Draw prior to antibiotics. [[id number]] Collected: 06/15/22 1634    Order Status: Completed Specimen: Blood from Peripheral, Antecubital, Left Updated: 06/19/22 1812     Blood Culture, Routine No Growth to date      No Growth to  date      No Growth to date      No Growth to date      No Growth to date    Narrative:      Aerobic and anaerobic    Aerobic culture [[id number]]  (Abnormal)  (Susceptibility) Collected: 06/16/22 1305    Order Status: Completed Specimen: Wound from Foot, Left Updated: 06/18/22 1124     Aerobic Bacterial Culture STREPTOCOCCUS AGALACTIAE (GROUP B)  Moderate  Beta-hemolytic streptococci are routinely susceptible to   penicillins,cephalosporins and carbapenems.  Susceptibility testing not routinely performed        METHICILLIN RESISTANT STAPHYLOCOCCUS AUREUS  Moderate      Urine culture [[id number]] Collected: 06/15/22 1817    Order Status: Completed Specimen: Urine Updated: 06/16/22 2030     Urine Culture, Routine No growth    Narrative:      Specimen Source->Urine    Gram stain [[id number]] Collected: 06/16/22 1308    Order Status: Completed Specimen: Bone from Foot, Left Updated: 06/16/22 1813     Gram Stain Result No WBC's      No organisms seen    Gram stain [[id number]] Collected: 06/16/22 1305    Order Status: Completed Specimen: Wound from Foot, Left Updated: 06/16/22 1803     Gram Stain Result Rare WBC's      Rare Gram positive cocci

## 2022-06-21 LAB
POCT GLUCOSE: 206 MG/DL (ref 70–110)
POCT GLUCOSE: 210 MG/DL (ref 70–110)
POCT GLUCOSE: 228 MG/DL (ref 70–110)
POCT GLUCOSE: 235 MG/DL (ref 70–110)
POCT GLUCOSE: 244 MG/DL (ref 70–110)
POCT GLUCOSE: 264 MG/DL (ref 70–110)
VANCOMYCIN SERPL-MCNC: 23.8 UG/ML

## 2022-06-21 PROCEDURE — 63600175 PHARM REV CODE 636 W HCPCS: Performed by: PHYSICIAN ASSISTANT

## 2022-06-21 PROCEDURE — 27000207 HC ISOLATION

## 2022-06-21 PROCEDURE — 25000003 PHARM REV CODE 250: Performed by: HOSPITALIST

## 2022-06-21 PROCEDURE — 25000003 PHARM REV CODE 250: Performed by: INTERNAL MEDICINE

## 2022-06-21 PROCEDURE — 36415 COLL VENOUS BLD VENIPUNCTURE: CPT | Performed by: INTERNAL MEDICINE

## 2022-06-21 PROCEDURE — 99233 SBSQ HOSP IP/OBS HIGH 50: CPT | Mod: ,,, | Performed by: PHYSICIAN ASSISTANT

## 2022-06-21 PROCEDURE — 99233 PR SUBSEQUENT HOSPITAL CARE,LEVL III: ICD-10-PCS | Mod: 95,,, | Performed by: INTERNAL MEDICINE

## 2022-06-21 PROCEDURE — 99233 PR SUBSEQUENT HOSPITAL CARE,LEVL III: ICD-10-PCS | Mod: ,,, | Performed by: PHYSICIAN ASSISTANT

## 2022-06-21 PROCEDURE — 63600175 PHARM REV CODE 636 W HCPCS: Performed by: INTERNAL MEDICINE

## 2022-06-21 PROCEDURE — 99233 SBSQ HOSP IP/OBS HIGH 50: CPT | Mod: 95,,, | Performed by: INTERNAL MEDICINE

## 2022-06-21 PROCEDURE — 20600001 HC STEP DOWN PRIVATE ROOM

## 2022-06-21 PROCEDURE — 86580 TB INTRADERMAL TEST: CPT | Performed by: INTERNAL MEDICINE

## 2022-06-21 PROCEDURE — 30200315 PPD INTRADERMAL TEST REV CODE 302: Performed by: INTERNAL MEDICINE

## 2022-06-21 PROCEDURE — 97165 OT EVAL LOW COMPLEX 30 MIN: CPT

## 2022-06-21 PROCEDURE — 97535 SELF CARE MNGMENT TRAINING: CPT

## 2022-06-21 PROCEDURE — 25000003 PHARM REV CODE 250: Performed by: REGISTERED NURSE

## 2022-06-21 PROCEDURE — 80202 ASSAY OF VANCOMYCIN: CPT | Performed by: INTERNAL MEDICINE

## 2022-06-21 RX ORDER — MAG HYDROX/ALUMINUM HYD/SIMETH 200-200-20
30 SUSPENSION, ORAL (FINAL DOSE FORM) ORAL EVERY 6 HOURS PRN
Status: DISCONTINUED | OUTPATIENT
Start: 2022-06-21 | End: 2022-07-08 | Stop reason: HOSPADM

## 2022-06-21 RX ORDER — INSULIN ASPART 100 [IU]/ML
11 INJECTION, SOLUTION INTRAVENOUS; SUBCUTANEOUS
Status: DISCONTINUED | OUTPATIENT
Start: 2022-06-21 | End: 2022-06-22

## 2022-06-21 RX ORDER — NIFEDIPINE 30 MG/1
90 TABLET, EXTENDED RELEASE ORAL DAILY
Status: DISCONTINUED | OUTPATIENT
Start: 2022-06-22 | End: 2022-07-08 | Stop reason: HOSPADM

## 2022-06-21 RX ADMIN — INSULIN ASPART 2 UNITS: 100 INJECTION, SOLUTION INTRAVENOUS; SUBCUTANEOUS at 08:06

## 2022-06-21 RX ADMIN — INSULIN ASPART 10 UNITS: 100 INJECTION, SOLUTION INTRAVENOUS; SUBCUTANEOUS at 08:06

## 2022-06-21 RX ADMIN — GABAPENTIN 200 MG: 100 CAPSULE ORAL at 09:06

## 2022-06-21 RX ADMIN — METRONIDAZOLE 500 MG: 500 TABLET ORAL at 01:06

## 2022-06-21 RX ADMIN — INSULIN ASPART 2 UNITS: 100 INJECTION, SOLUTION INTRAVENOUS; SUBCUTANEOUS at 05:06

## 2022-06-21 RX ADMIN — INSULIN ASPART 11 UNITS: 100 INJECTION, SOLUTION INTRAVENOUS; SUBCUTANEOUS at 05:06

## 2022-06-21 RX ADMIN — NIFEDIPINE 60 MG: 30 TABLET, FILM COATED, EXTENDED RELEASE ORAL at 08:06

## 2022-06-21 RX ADMIN — Medication 1 CAPSULE: at 09:06

## 2022-06-21 RX ADMIN — ASPIRIN 81 MG: 81 TABLET, COATED ORAL at 08:06

## 2022-06-21 RX ADMIN — TUBERCULIN PURIFIED PROTEIN DERIVATIVE 5 UNITS: 5 INJECTION, SOLUTION INTRADERMAL at 11:06

## 2022-06-21 RX ADMIN — GABAPENTIN 200 MG: 100 CAPSULE ORAL at 08:06

## 2022-06-21 RX ADMIN — HYDRALAZINE HYDROCHLORIDE 25 MG: 25 TABLET, FILM COATED ORAL at 05:06

## 2022-06-21 RX ADMIN — METRONIDAZOLE 500 MG: 500 TABLET ORAL at 09:06

## 2022-06-21 RX ADMIN — INSULIN ASPART 3 UNITS: 100 INJECTION, SOLUTION INTRAVENOUS; SUBCUTANEOUS at 11:06

## 2022-06-21 RX ADMIN — CEFTRIAXONE 2 G: 2 INJECTION, SOLUTION INTRAVENOUS at 11:06

## 2022-06-21 RX ADMIN — VANCOMYCIN HYDROCHLORIDE 750 MG: 750 INJECTION, POWDER, LYOPHILIZED, FOR SOLUTION INTRAVENOUS at 01:06

## 2022-06-21 RX ADMIN — INSULIN ASPART 1 UNITS: 100 INJECTION, SOLUTION INTRAVENOUS; SUBCUTANEOUS at 09:06

## 2022-06-21 RX ADMIN — Medication 1 CAPSULE: at 12:06

## 2022-06-21 RX ADMIN — METRONIDAZOLE 500 MG: 500 TABLET ORAL at 05:06

## 2022-06-21 RX ADMIN — INSULIN ASPART 11 UNITS: 100 INJECTION, SOLUTION INTRAVENOUS; SUBCUTANEOUS at 11:06

## 2022-06-21 RX ADMIN — ALUMINUM HYDROXIDE, MAGNESIUM HYDROXIDE, AND SIMETHICONE 30 ML: 200; 200; 20 SUSPENSION ORAL at 05:06

## 2022-06-21 RX ADMIN — TAMSULOSIN HYDROCHLORIDE 0.4 MG: 0.4 CAPSULE ORAL at 08:06

## 2022-06-21 RX ADMIN — ATORVASTATIN CALCIUM 80 MG: 20 TABLET, FILM COATED ORAL at 08:06

## 2022-06-21 RX ADMIN — SUCRALFATE 1 G: 1 SUSPENSION ORAL at 05:06

## 2022-06-21 NOTE — PROGRESS NOTES
[facility] - Telemetry Stepdown  Infectious Disease  Progress Note    Patient Name: Saji Castañeda  MRN: [id number]  Admission Date: 6/15/2022  Length of Stay: 6 days  Attending Physician: Salma Strauss MD  Primary Care Provider: Mart Escalante MD    Isolation Status: Contact  Assessment/Plan:       Peripheral arterial disease     See assessment and plan below     Osteomyelitis of left lower extremity     72 year old male with history of DM2 with peripheral neuropathy, left midtarsal amputation 2010, left calcaneal osteomyelitis s/p treatment in the past who was admitted for infected left heel ulceration. Tib/fib xray of the left leg notable for osteomyelitis of the posterior calcaneus. Vascular studies concerning for left lower leg arterial stenosis and calf vessel narrowing/occulsions.    Wound culture is positive for GBS, MRSA, prevotella and porphromonas.   Bone culture is positive for Staph species. Pathology pending.     Patient is currently on ceftriaxone, vancomycin and flagyl. Fevers resolved. No leukocytosis. Vascular surgery recommends AKA vs BKA. Patient continues to weigh his decisions.     Recommendations:  - Continue Vancomycin. Inpatient pharmacy to manage vanco dosing.   - Continue Ceftriaxone and Flagyl  - Follow up bone culture and pathology results  - If patient undergoes a more proximal amputation, long term antibiotics will not be indicated   - Discussed plan with ID staff and Dr. Strauss. ID will follow.        Thank you for the consult. Please call for any questions.  Kimmy Morales PA-C  ID EVELINA Spectra: [phone]    Subjective:     Principal Problem:Osteomyelitis of left lower extremity    HPI: Mr. Castañeda is a 72 year old male with PMH of DM2 with peripheral neuropathy, left foot amputation at the midtarsal joint, osteomyelitis s/p left high midfoot amputation who was referred to [facility] ED following podiatry clinic evaluation of diabetic foot wound. Pt was seen in podiatry clinic on 6/7 and  was found to chronic nonhealing left heal with nonviable tissues, BKA was discussed, but pt was not interested. He followed up one week later on 6/15 where the wound was found to be extremely malodorous, posterior leg ulceration with increased drainage and depth. The left hellp stump lesion was found to have increased exposed bone. Therefore the pt was sent to the ED for further eval and IV abx therapy.     To note, pt has minimal ability to ambulate at baseline, uses a wheelchair for locomotion.     Since admission, pt has remained hypertensive, with an isolated fever of 100.7 today on 6/17, remains on RA. No leukocytosis noted, elevated sed rate (>120), elevated CRP (27), low albumin noted, UA without concerns for infection. Blood cultures on 6/15 NGTD, urine culture NGTD. Wound culture from left foot + streptococcus agalactiae (GBS), staph aureus, sensitives pending. Bone culture from left foot pending.     Left lower leg arterial US was concerning for significant stenosis within the popliteal artery. Mild peripheral arterial disease noted on left/right leg ABIs. CTA significant for severe arthrosclerotic disease of the vasculature, narrowing/occlusions of the calf vessels. Tib/fib xray of the left leg notable for ulcer crater down to bone with osteomyelitis of the posterior calcaneus.    Pt is on cefepime and vancomycin. ID was consulted for suspected osteomyelitis left calcaneus.           Interval History: NAEON. Afebrile. No leukocytosis. Patient tolerating antibiotics. Denies fevers, chills, sweats, itching, rash. Reports loose stools with miralax. Discussed amputation today. Patient is nonambulatory. He is very independent at home and transfers himself. His main concern about amputation appears to be getting fitted for a prosthetic limb and he would like more information on this. He likes the idea of achieving source control with amputation and seems more open to this. Discussed with Dr. Strauss.     Review  of Systems   Constitutional:  Negative for chills, diaphoresis, fatigue and fever.   Respiratory:  Negative for cough and shortness of breath.    Cardiovascular:  Negative for chest pain and leg swelling.   Gastrointestinal:  Positive for diarrhea. Negative for abdominal pain and vomiting.   Genitourinary:  Negative for difficulty urinating.   Musculoskeletal:  Positive for gait problem. Negative for arthralgias, joint swelling and myalgias.   Skin:  Positive for wound. Negative for color change.   Neurological:  Positive for numbness. Negative for dizziness and headaches.   Psychiatric/Behavioral:  Negative for agitation and confusion. The patient is not nervous/anxious.    Objective:     Vital Signs (Most Recent):  Temp: 98.3 °F (36.8 °C) (06/21/22 1145)  Pulse: 63 (06/21/22 1145)  Resp: (!) 24 (06/21/22 1145)  BP: (!) 176/76 (06/21/22 1145)  SpO2: 96 % (06/21/22 1145)   Vital Signs (24h Range):  Temp:  [98.1 °F (36.7 °C)-98.6 °F (37 °C)] 98.3 °F (36.8 °C)  Pulse:  [] 63  Resp:  [16-24] 24  SpO2:  [91 %-96 %] 96 %  BP: (128-187)/(62-87) 176/76     Weight: 124.3 kg (274 lb 0.5 oz)  Body mass index is 40.47 kg/m².    Estimated Creatinine Clearance: 61.2 mL/min (based on SCr of 1.4 mg/dL).    Physical Exam  Constitutional:       General: He is not in acute distress.     Appearance: He is obese. He is not ill-appearing, toxic-appearing or diaphoretic.   HENT:      Head: Normocephalic.      Nose: Nose normal.      Mouth/Throat:      Mouth: Mucous membranes are moist.      Pharynx: Oropharynx is clear.   Eyes:      Conjunctiva/sclera: Conjunctivae normal.   Cardiovascular:      Rate and Rhythm: Normal rate and regular rhythm.   Pulmonary:      Effort: Pulmonary effort is normal. No respiratory distress.      Breath sounds: Normal breath sounds.   Abdominal:      General: Abdomen is flat. There is no distension.      Palpations: Abdomen is soft.      Tenderness: There is no abdominal tenderness.   Musculoskeletal:          General: Swelling and deformity (L foot amputation) present.      Right lower leg: Edema present.      Left lower leg: Edema present.   Skin:     General: Skin is warm and dry.      Comments: Left heel wound dressed. See photo in media tab   Neurological:      Mental Status: He is alert and oriented to person, place, and time. Mental status is at baseline.      Sensory: Sensory deficit present.      Motor: No weakness.   Psychiatric:         Mood and Affect: Mood normal.         Behavior: Behavior normal.       Significant Labs:   Microbiology Results (last 7 days)       Procedure Component Value Units Date/Time    Aerobic culture [[id number]]  (Abnormal) Collected: 06/16/22 1308    Order Status: Completed Specimen: Bone from Foot, Left Updated: 06/21/22 0737     Aerobic Bacterial Culture STAPHYLOCOCCUS SPECIES  Rare  Identification and susceptibility pending      Blood culture x two cultures. Draw prior to antibiotics. [[id number]] Collected: 06/15/22 1633    Order Status: Completed Specimen: Blood from Peripheral, Antecubital, Right Updated: 06/20/22 1812     Blood Culture, Routine No growth after 5 days.    Narrative:      Aerobic and anaerobic    Blood culture x two cultures. Draw prior to antibiotics. [[id number]] Collected: 06/15/22 1634    Order Status: Completed Specimen: Blood from Peripheral, Antecubital, Left Updated: 06/20/22 1812     Blood Culture, Routine No growth after 5 days.    Narrative:      Aerobic and anaerobic    Culture, Anaerobe [[id number]]  (Abnormal) Collected: 06/16/22 1305    Order Status: Completed Specimen: Wound from Foot, Left Updated: 06/20/22 1209     Anaerobic Culture PRESUMPTIVE PREVOTELLA/PORPHYROMONAS GROUP  Rare        PORPHYROMONAS SOMERAE  Rare      Culture, Anaerobe [[id number]] Collected: 06/16/22 1308    Order Status: Completed Specimen: Bone from Foot, Left Updated: 06/20/22 0736     Anaerobic Culture No anaerobes isolated    Aerobic culture [[id number]]  (Abnormal)   (Susceptibility) Collected: 06/16/22 1305    Order Status: Completed Specimen: Wound from Foot, Left Updated: 06/18/22 1124     Aerobic Bacterial Culture STREPTOCOCCUS AGALACTIAE (GROUP B)  Moderate  Beta-hemolytic streptococci are routinely susceptible to   penicillins,cephalosporins and carbapenems.  Susceptibility testing not routinely performed        METHICILLIN RESISTANT STAPHYLOCOCCUS AUREUS  Moderate      Urine culture [[id number]] Collected: 06/15/22 1817    Order Status: Completed Specimen: Urine Updated: 06/16/22 2030     Urine Culture, Routine No growth    Narrative:      Specimen Source->Urine    Gram stain [[id number]] Collected: 06/16/22 1308    Order Status: Completed Specimen: Bone from Foot, Left Updated: 06/16/22 1813     Gram Stain Result No WBC's      No organisms seen    Gram stain [[id number]] Collected: 06/16/22 1305    Order Status: Completed Specimen: Wound from Foot, Left Updated: 06/16/22 1803     Gram Stain Result Rare WBC's      Rare Gram positive cocci          Recent Lab Results  (Last 5 results in the past 24 hours)        06/21/22  1136   06/21/22  0944   06/21/22  0817   06/20/22  2104   06/20/22  1731        POCT Glucose 264     228   235   210       Vancomycin, Random   23.8                                    Significant Imaging: I have reviewed all pertinent imaging results/findings within the past 24 hours.

## 2022-06-21 NOTE — ASSESSMENT & PLAN NOTE
Per med history, patient no longer taking ARB.  sCr up slightly and vanco level > 20.  Discontinuing ARB, holding diuretic for now. Trial of gentle hydration.

## 2022-06-21 NOTE — PLAN OF CARE
Problem: Occupational Therapy  Goal: Occupational Therapy Goal  Description: Goals to be met by: 07-02-22     Patient will increase functional independence with ADLs by performing:    UE Dressing with Set-up Assistance.  LE Dressing with Minimal Assistance.  Grooming while seated with Set-up Assistance.  Toileting from bedside commode with Stand-by Assistance for hygiene and clothing management.   Supine to sit with Stand-by Assistance.  Stand pivot transfers with Stand-by Assistance with RW and PWB in left heel only  Toilet transfer to bedside commode with Stand-by Assistance.  Pt. To perform scoot pivot transfer with Supervision to and from the wheelchair    Outcome: Ongoing, Progressing

## 2022-06-21 NOTE — PLAN OF CARE
06/21/22 1434   Post-Acute Status   Post-Acute Authorization Placement   Post-Acute Placement Status Referrals Sent   Home Health Status Discharge Plan Changed   Patient is agreeable to SNF placement, [facility] is first preference. Referrals sent to [facility] as well as several additional SNFs.    Attempted to call in LOCET to [facility] and was informed they will return call to  at a later time.    4:19 PM  Completed LOCET and faxed PASRR to the [facility]. Awaiting issuance of form 142.    Toshia Dennis LMSW  [facility]  Ext. [phone]

## 2022-06-21 NOTE — CONSULTS
Pharmacokinetic Assessment Follow Up: IV Vancomycin    Vancomycin serum concentration assessment/plan:   Random level resulted as 23.8 mcg/mL; Goal 15-20 mcg/mL, Bone/joint Infection    Patient meets criteria for YNES and Serum creatinine increased from 1.2 to 1.4 mg/dL   Re-dose Vancomycin 750 mg IV x 1; continue to dose by level when the random is less than 20 mcg/mL   Next level to be drawn on 6/22 at 0430    Drug levels (last 3 results):  Recent Labs   Lab Result Units 06/20/22  0425 06/21/22  0944   Vancomycin, Random ug/mL  --  23.8   Vancomycin-Trough ug/mL 29.7*  --        Pharmacy will continue to follow and monitor vancomycin.    Please contact pharmacy at extension [phone] for questions regarding this assessment.    Thank you for the consult,   Sarahy Abebe     Patient brief summary:  Saji Castañeda is a 72 y.o. male initiated on antimicrobial therapy with IV Vancomycin for treatment of bone/joint infection    The patient's current regimen is 1500 mg IV q12h    Drug Allergies:   Review of patient's allergies indicates:  No Known Allergies    Actual Body Weight:   124.3 kg    Renal Function:   Estimated Creatinine Clearance: 61.2 mL/min (based on SCr of 1.4 mg/dL).,     Dialysis Method (if applicable):  N/A    CBC (last 72 hours):  Recent Labs   Lab Result Units 06/20/22  0425   WBC K/uL 6.22   Hemoglobin g/dL 10.4*   Hematocrit % 33.4*   Platelets K/uL 342   Gran % % 63.1   Lymph % % 16.6*   Mono % % 11.7   Eosinophil % % 7.9   Basophil % % 0.5   Differential Method  Automated       Metabolic Panel (last 72 hours):  Recent Labs   Lab Result Units 06/20/22  0425   Sodium mmol/L 135*   Potassium mmol/L 4.3   Chloride mmol/L 98   CO2 mmol/L 28   Glucose mg/dL 281*   BUN mg/dL 20   Creatinine mg/dL 1.4   Albumin g/dL 2.4*   Total Bilirubin mg/dL 0.3   Alkaline Phosphatase U/L 69   AST U/L 14   ALT U/L 9*       Vancomycin Administrations:  vancomycin given in the last 96 hours                    vancomycin 1.5 g in dextrose 5 % 250 mL IVPB (ready to mix) (mg) 1,500 mg New Bag 06/20/22 0642     1,500 mg New Bag 06/19/22 1944     1,500 mg New Bag  0629     1,500 mg New Bag 06/18/22 1827    vancomycin 1.75 g in 5 % dextrose 500 mL IVPB (mg) 1,750 mg New Bag 06/18/22 0630     1,750 mg New Bag 06/17/22 1734     1,750 mg New Bag  0619    vancomycin 2 g in dextrose 5 % 500 mL IVPB (mg) 2,000 mg New Bag 06/16/22 1825                Microbiologic Results:  Microbiology Results (last 7 days)     Procedure Component Value Units Date/Time    Aerobic culture [[id number]]  (Abnormal) Collected: 06/16/22 1308    Order Status: Completed Specimen: Bone from Foot, Left Updated: 06/21/22 0737     Aerobic Bacterial Culture STAPHYLOCOCCUS SPECIES  Rare  Identification and susceptibility pending      Blood culture x two cultures. Draw prior to antibiotics. [[id number]] Collected: 06/15/22 1633    Order Status: Completed Specimen: Blood from Peripheral, Antecubital, Right Updated: 06/20/22 1812     Blood Culture, Routine No growth after 5 days.    Narrative:      Aerobic and anaerobic    Blood culture x two cultures. Draw prior to antibiotics. [[id number]] Collected: 06/15/22 1634    Order Status: Completed Specimen: Blood from Peripheral, Antecubital, Left Updated: 06/20/22 1812     Blood Culture, Routine No growth after 5 days.    Narrative:      Aerobic and anaerobic    Culture, Anaerobe [[id number]]  (Abnormal) Collected: 06/16/22 1305    Order Status: Completed Specimen: Wound from Foot, Left Updated: 06/20/22 1209     Anaerobic Culture PRESUMPTIVE PREVOTELLA/PORPHYROMONAS GROUP  Rare        PORPHYROMONAS SOMERAE  Rare      Culture, Anaerobe [[id number]] Collected: 06/16/22 1308    Order Status: Completed Specimen: Bone from Foot, Left Updated: 06/20/22 0736     Anaerobic Culture No anaerobes isolated    Aerobic culture [[id number]]  (Abnormal)  (Susceptibility) Collected: 06/16/22 1305    Order Status: Completed Specimen: Wound  from Foot, Left Updated: 06/18/22 1124     Aerobic Bacterial Culture STREPTOCOCCUS AGALACTIAE (GROUP B)  Moderate  Beta-hemolytic streptococci are routinely susceptible to   penicillins,cephalosporins and carbapenems.  Susceptibility testing not routinely performed        METHICILLIN RESISTANT STAPHYLOCOCCUS AUREUS  Moderate      Urine culture [[id number]] Collected: 06/15/22 1817    Order Status: Completed Specimen: Urine Updated: 06/16/22 2030     Urine Culture, Routine No growth    Narrative:      Specimen Source->Urine    Gram stain [[id number]] Collected: 06/16/22 1308    Order Status: Completed Specimen: Bone from Foot, Left Updated: 06/16/22 1813     Gram Stain Result No WBC's      No organisms seen    Gram stain [[id number]] Collected: 06/16/22 1305    Order Status: Completed Specimen: Wound from Foot, Left Updated: 06/16/22 1803     Gram Stain Result Rare WBC's      Rare Gram positive cocci

## 2022-06-21 NOTE — SUBJECTIVE & OBJECTIVE
Telemedicine  This service was provided by Virtual Visit.    Patient was transferred to [facility] on:  6/18/2022    Chief Complaint   Patient presents with    Wound Check     Sent from podiatry and stated in pain. L toes amputated     The patient location is: 8092/8092 A   Admitted 6/15/2022  2:10 PM  Present with the patient at the time of the telemed/virtual assessment: Telepresenter    Interval History / Events Overnight:   The patient is able to provide adequate history. Additional history was obtained from past medical records. No significant events reported by Nursing.   Patient complains of diarrhea. Symptoms have worsened since yesterday. Associated symptoms include: fatigue. Symptoms are increasing in frequency.     Lab test(s) reviewed: hyperglycemia, vancomycin level elevated.    Review of Systems   Constitutional:  Negative for fever.   Respiratory:  Negative for shortness of breath.      Objective:     Vital Signs (Most Recent):  Temp: 98.6 °F (37 °C) (06/21/22 0712)  Pulse: (!) 58 (06/21/22 0755)  Resp: 16 (06/21/22 0712)  BP: (!) 163/70 (06/21/22 0712)  SpO2: (!) 94 % (06/21/22 0712)   Vital Signs (24h Range):  Temp:  [98.1 °F (36.7 °C)-98.6 °F (37 °C)] 98.6 °F (37 °C)  Pulse:  [] 58  Resp:  [16-20] 16  SpO2:  [91 %-95 %] 94 %  BP: (128-187)/(62-87) 163/70     Weight: 124.3 kg (274 lb 0.5 oz)  Body mass index is 40.47 kg/m².    Intake/Output Summary (Last 24 hours) at 6/21/2022 1121  Last data filed at 6/20/2022 1829  Gross per 24 hour   Intake 400 ml   Output 300 ml   Net 100 ml        Physical Exam  Constitutional:       General: He is not in acute distress.     Appearance: Normal appearance.   Eyes:      General: Lids are normal. No scleral icterus.        Right eye: No discharge.         Left eye: No discharge.      Conjunctiva/sclera: Conjunctivae normal.   Neck:      Trachea: Phonation normal.   Cardiovascular:      Rate and Rhythm: Bradycardia present.      Comments:  Monitor / Vital signs reviewed at time of visit  Pulmonary:      Effort: Pulmonary effort is normal. No tachypnea, accessory muscle usage or respiratory distress.   Abdominal:      General: There is no distension.   Musculoskeletal:      Left foot: Deformity (s/p partial amputation) present.   Skin:     Coloration: Skin is not cyanotic.   Neurological:      Mental Status: He is alert. He is not disoriented.   Psychiatric:         Attention and Perception: Attention normal.         Behavior: Behavior is cooperative.       Significant Labs:   Recent Labs   Lab 06/16/22  1100   HGBA1C 9.5*       Recent Labs   Lab 06/20/22  1139 06/20/22  1526 06/20/22  1731   POCTGLUCOSE 293* 235* 210*       Recent Labs   Lab 06/16/22  1100 06/18/22  0328 06/20/22  0425   WBC 4.56 4.86 6.22   HGB 10.8* 10.3* 10.4*   HCT 36.0* 32.8* 33.4*    315 342       Recent Labs   Lab 06/16/22  1100 06/18/22 0328 06/20/22  0425   GRAN 59.0  2.7 58.9  2.9 63.1  3.9   LYMPH 24.6  1.1 18.1  0.9* 16.6*  1.0   MONO 10.5  0.5 15.4*  0.8 11.7  0.7   EOS 0.2 0.3 0.5       Recent Labs   Lab 06/15/22  1633 06/16/22  1100 06/18/22  0328 06/20/22  0425    138 134* 135*   K 3.9 3.7 4.3 4.3    104 98 98   CO2 25 24 25 28   BUN 10 9 13 20   CREATININE 1.1 0.9 1.2 1.4   GLU 68* 81 246* 281*   CALCIUM 9.2 9.1 9.2 9.3   ALBUMIN 2.8* 2.6* 2.4* 2.4*   MG 1.7  --   --   --    PHOS 3.2  --   --   --        Recent Labs   Lab 06/15/22  1633 06/16/22  1100 06/18/22  0328 06/20/22  0425   ALKPHOS 87 84 73 69   ALT 11 7* 8* 9*   AST 12 11 12 14   PROT 8.0 7.5 7.1 7.0   BILITOT 0.4 0.5 0.3 0.3   CRP 26.7*  --   --   --        Recent Labs   Lab 02/23/22  1407 06/15/22  1633   LACTATE  --  1.4   SEDRATE >120* >120*       SARS-CoV2 (COVID-19) Qualitative PCR (no units)   Date Value   03/29/2021 Not Detected   03/15/2021 Not Detected   03/02/2021 Not Detected   02/22/2021 Not Detected   01/29/2021 Not Detected     SARS-CoV-2 RNA, Amplification, Qual  (no units)   Date Value   02/03/2021 Negative     POC Rapid COVID (no units)   Date Value   06/15/2022 Negative   02/18/2021 Negative   12/08/2020 Negative     CTA Runoff ABD PEL Bilat Lower Ext  Narrative: EXAMINATION:  CTA RUNOFF ABD PEL BILAT LOWER EXT    CLINICAL HISTORY:  Claudication or leg ischemia;Peripheral arterial disease (PAD), asymptomatic;    TECHNIQUE:  CT angiogram of the abdomen/pelvis with lower extremity runoff using xyz mL of  Omnipaque 350 IV contrast.  Multiplanar reconstructions performed.    COMPARISON:  CT abdomen pelvis 02/03/2012    FINDINGS:  Upper extremities within the gantry resulting in beam hardening artifact limiting assessment.    Abdominopelvic Vasculature:    Visualized aorta demonstrates mild calcific atherosclerosis with no convincing evidence of dissection, aneurysm, high-grade stenosis.    Renal, superior mesenteric, and celiac arteries are patent.  GEORGIE is not well assessed due to artifact and poor opacification however appears patent with possible mild to moderate narrowing at the origin.    Common and external iliac arteries demonstrate no hemodynamically significant stenosis.  Bilateral internal iliac arteries demonstrate calcific and noncalcific atherosclerosis likely resulting in areas of moderate narrowing not well assessed due to artifact and poor opacification.    Right lower extremity:    Right common femoral and proximal superficial femoral arteries demonstrate no hemodynamically significant stenosis.  Right DFA with probable multifocal high-grade narrowing/occlusions versus poor opacification.  Moderate atherosclerotic disease of the mid to distal SFA with suspected areas of moderate to high-grade narrowing difficult to assess due to poor opacification.    Right popliteal artery with suspected multifocal moderate to high-grade narrowing limited assessment due to poor opacification.    Moderate narrowing at the trifurcation.    Suspected moderate narrowing at the  peroneal origin with severe atherosclerotic disease and probable occlusions distally.    Severe narrowing at the origin of the posterior tibial artery with multifocal severe atherosclerotic disease distally limiting assessment.    Suspected high-grade narrowing at the origin of the anterior tibial artery, otherwise patent.    Left lower extremity:    Left common femoral and superficial femoral arteries are likely patent.  Left DFA with suspected multifocal high-grade narrowing/occlusions versus poor opacification.    Suspected multifocal occlusions of the distal left popliteal artery.  Trifurcation with high-grade narrowing.    Severe atherosclerotic disease of the posterior tibial artery and suspected occlusion of the origin.    Multifocal occlusions or high-grade narrowing of the peroneal artery.    Anterior tibial artery with areas of patency otherwise there is severe atherosclerotic disease with multifocal high-grade narrowing and possible short-segment occlusions.    Bilateral lower extremities demonstrate diffuse subcutaneous edema and left-sided asymmetric muscular atrophy compared to the right.  Advanced degenerative changes of the knees.    Inferior Mediastinum/Heart: Normal.    Lung Bases: No nodules or consolidation.    Peritoneal Space: No ascites. No free air.    Liver: No focal mass.    Gallbladder: Tiny suspected gas foci within the gallbladder neck lumen which may suggest stones containing gas.    Bile Ducts: No evidence of dilated ducts.    Pancreas: No mass or peripancreatic fat stranding.    Spleen: Normal.    Adrenals: Unremarkable.    Bowel/Mesentery: No evidence of bowel obstruction or inflammation.  Diverticulosis coli.    Urinary Tract: No evidence of obstructive uropathy. Kidneys enhance symmetrically.  Left renal 1.8 cm cyst.    Retroperitoneum:  No significant adenopathy.    Pelvis:  Prostatomegaly.    Thoracic/Abdominal wall:  Gynecomastia bilaterally.    Bones: Moderate degenerative  changes of the spine with multilevel mild endplate compression deformities of the lumbar spine unchanged.  Impression: Poor contrast opacification of the vasculature limits assessment.    Severe atherosclerotic disease of the vasculature.  Multifocal high-grade narrowing or occlusions of the calf vessels.  Vascular narrowing/occlusions may be overestimated due to poor opacification.    Electronically signed by resident: Jaylan Griffin  Date:    06/16/2022  Time:    15:08    Electronically signed by: Eric Carlos MD  Date:    06/16/2022  Time:    16:18  VAS Toe Brachial Indices Resting  Indication  ========    Peripheral Arterial Disease    Pressure Lower  ============    Rt brachial A syst BP  178 mmHg  Rt PTA BP  255 mmHg  Rt dors pedis A  mmHg  Rt toe BP  130 mmHg  Right MARIO ratio use:   post. tibial  Rt MARIO post tibial (rt post tib A BP / max brach A BP) 1.43  Rt MARIO (rt dors ped A BP / max brach A BP) 1.43  Rt ankle BP / max brach A BP   1.43  Rt TBI (rt toe BP / max brach A BP)    0.73  Lt PTA BP  255 mmHg  Left MARIO ratio use:    post. tibial  Lt MARIO (lt post tibial A BP / max brach A BP)  1.43  Lt ankle BP / max brach A BP   1.43    PPG Lower  =========    Rt toe BP - PPG    130 mmHg  Rt toe digit waveform: severely dampened    Impression  =========    Right Leg: Segmental pressures are unreliable due to medial calcinosis; however, PVR waveforms suggest mild peripheral arterial  occlusive disease.  Left Leg: Segmental pressures are unreliable due to medial calcinosis; however, PVR waveforms suggest mild peripheral arterial  occlusive disease. Absent DPA signal.    -Bilateral Toe PPG's and Pressures were performed.  Rt lower digits: Toe PPG waveforms as described above. - TBI of 0.73 with a Great toe pressure of 130 mmHg mmHg is noted.  Lt lower digits: History of foot amputation.    DATE OF SERVICE: 06/16/2022                                                      Sonographer: Leatha Devi RDMS,  RVT  Electronically Signed by: ADAM Jauregui M.D. at 06/16/2022-10:53      Labs and Imaging within the last 24 hours listed above were reviewed.       Diet: Diet diabetic [facility]; 2000 Calorie; Cardiac (Low Na/Chol)  Significant LDAs:   IV Access Type: Peripheral  Urinary Catheter Indication if present: Patient Does Not Have Urinary Catheter  Other Lines/Tubes/Drains:    HIGH RISK CONDITION(S):   Patient has a condition that poses threat to life and bodily function: ischemic limb likely in need of amputation     Goals of Care:    Previous admission:  2/17/21  Likely prognosis:  Fair  Code Status: Full Code  Comfort Only: No  Hospice: No  Goals at discharge: remain at home, with physician follow-up    Discharge Planning:  OXANA: 6/22/2022     Code Status: Full Code   Is the patient medically ready for discharge?: No    Reason for patient still in hospital (select all that apply): Patient trending condition, Treatment, Consult recommendations, and Pending disposition  Discharge Plan A: Home Health, Home, Other (IV ABX pending)

## 2022-06-21 NOTE — ASSESSMENT & PLAN NOTE
72 year old male with history of DM2 with peripheral neuropathy, left midtarsal amputation 2010, left calcaneal osteomyelitis s/p treatment in the past who was admitted for infected left heel ulceration. Tib/fib xray of the left leg notable for osteomyelitis of the posterior calcaneus. Vascular studies concerning for left lower leg arterial stenosis and calf vessel narrowing/occulsions.    Wound culture is positive for GBS, MRSA, prevotella and porphromonas.   Bone culture is positive for Staph species. Pathology pending.     Patient is currently on ceftriaxone, vancomycin and flagyl. Fevers resolved. No leukocytosis. Vascular surgery recommends AKA vs BKA. Patient continues to weigh his decisions.     Recommendations:  - Continue Vancomycin. Inpatient pharmacy to manage vanco dosing.   - Continue Ceftriaxone and Flagyl  - Follow up bone culture and pathology results  - If patient undergoes a more proximal amputation, long term antibiotics will not be indicated   - Discussed plan with ID staff and Dr. Strauss. ID will follow.

## 2022-06-21 NOTE — PROGRESS NOTES
[facility]aron [facility] - Telemetry [facility] Medicine  Telemedicine Progress Note    Patient Name: Saji Castañeda  MRN: [id number]  Patient Class: IP- Inpatient   Admission Date: 6/15/2022  Length of Stay: 5 days  Attending Physician: Salma Strauss MD  Primary Care Provider: Mart Escalante MD      Subjective:     Principal Problem:Osteomyelitis of left lower extremity    HPI:  71 y.o. M with PMHx DM2 and PVD with chronic diabetic foot wounds s/p L sided BKA, HTN, asymptomatic bradycardia, CKD 3 and HLD who presented to the ED for worsening R foot pain and drainage. Pt. With known chronic ulceration/osteomylitis to L heel and ID has recommended BKA for definitive therapy, but the patient reports that he has not been interested in amputation. Today, he had his usual f/u appointment with ID, and the patient was referred to the ED for imaging with bone biopsy and potential abx because of worsening drainage and pain. Pt. Denies any current fevers, chills, nausea, or other systemic signs of infection.      Overview/Hospital Course:  Patient admitted to hospital medicine. Podiatry and vascular surgery consulted. Patient underwent bone biopsy and culture. Infectious disease consulted.       Telemedicine  This service was provided by Virtual Visit.    Patient was transferred to [facility] on:  6/18/2022    Chief Complaint   Patient presents with    Wound Check     Sent from podiatry and stated in pain. L toes amputated     The patient location is: 8092/8092 A   Admitted 6/15/2022  2:10 PM  Present with the patient at the time of the telemed/virtual assessment: Telepresenter    Interval History / Events Overnight:   The patient is able to provide adequate history. Additional history was obtained from past medical records. No significant events reported by Nursing. BP elevated before AM medications. Pharmacy medication history reviewed.  Patient complains of nothing specific. Symptoms have been unchanged since  yesterday. Associated symptoms include: fatigue. Symptoms are stable.     Lab test(s) reviewed: hyperglycemia, vancomycin level elevated.    Review of Systems   Constitutional:  Negative for fever.   Respiratory:  Negative for shortness of breath.      Objective:     Vital Signs (Most Recent):  Temp: 98 °F (36.7 °C) (06/20/22 0728)  Pulse: 62 (06/20/22 1041)  Resp: 17 (06/20/22 1041)  BP: (!) 170/77 (06/20/22 0728)  SpO2: (!) 94 % (06/20/22 0728)   Vital Signs (24h Range):  Temp:  [98 °F (36.7 °C)-98.9 °F (37.2 °C)] 98 °F (36.7 °C)  Pulse:  [53-85] 62  Resp:  [16-25] 17  SpO2:  [92 %-96 %] 94 %  BP: (158-186)/(74-77) 170/77     Weight: 124.3 kg (274 lb 0.5 oz)  Body mass index is 40.47 kg/m².    Intake/Output Summary (Last 24 hours) at 6/20/2022 1130  Last data filed at 6/20/2022 0923  Gross per 24 hour   Intake 320 ml   Output 700 ml   Net -380 ml        Physical Exam  Constitutional:       General: He is not in acute distress.     Appearance: Normal appearance.   Eyes:      General: Lids are normal. No scleral icterus.        Right eye: No discharge.         Left eye: No discharge.      Conjunctiva/sclera: Conjunctivae normal.   Neck:      Trachea: Phonation normal.   Cardiovascular:      Rate and Rhythm: Bradycardia present.      Comments: Monitor / Vital signs reviewed at time of visit  Pulmonary:      Effort: Pulmonary effort is normal. No tachypnea, accessory muscle usage or respiratory distress.   Abdominal:      General: There is no distension.   Musculoskeletal:      Left foot: Deformity (s/p partial amputation) present.   Skin:     Coloration: Skin is not cyanotic.   Neurological:      Mental Status: He is alert. He is not disoriented.   Psychiatric:         Attention and Perception: Attention normal.         Behavior: Behavior is cooperative.       Significant Labs:   Recent Labs   Lab 06/16/22  1100   HGBA1C 9.5*       Recent Labs   Lab 06/19/22  1649 06/19/22  2200 06/20/22  0823   POCTGLUCOSE 152* 224*  357*       Recent Labs   Lab 06/16/22  1100 06/18/22  0328 06/20/22  0425   WBC 4.56 4.86 6.22   HGB 10.8* 10.3* 10.4*   HCT 36.0* 32.8* 33.4*    315 342       Recent Labs   Lab 06/16/22  1100 06/18/22  0328 06/20/22  0425   GRAN 59.0  2.7 58.9  2.9 63.1  3.9   LYMPH 24.6  1.1 18.1  0.9* 16.6*  1.0   MONO 10.5  0.5 15.4*  0.8 11.7  0.7   EOS 0.2 0.3 0.5       Recent Labs   Lab 06/15/22  1633 06/16/22  1100 06/18/22  0328 06/20/22  0425    138 134* 135*   K 3.9 3.7 4.3 4.3    104 98 98   CO2 25 24 25 28   BUN 10 9 13 20   CREATININE 1.1 0.9 1.2 1.4   GLU 68* 81 246* 281*   CALCIUM 9.2 9.1 9.2 9.3   ALBUMIN 2.8* 2.6* 2.4* 2.4*   MG 1.7  --   --   --    PHOS 3.2  --   --   --        Recent Labs   Lab 06/15/22  1633 06/16/22 1100 06/18/22 0328 06/20/22  0425   ALKPHOS 87 84 73 69   ALT 11 7* 8* 9*   AST 12 11 12 14   PROT 8.0 7.5 7.1 7.0   BILITOT 0.4 0.5 0.3 0.3   CRP 26.7*  --   --   --        Recent Labs   Lab 02/23/22  1407 06/15/22  1633   LACTATE  --  1.4   SEDRATE >120* >120*       SARS-CoV2 (COVID-19) Qualitative PCR (no units)   Date Value   03/29/2021 Not Detected   03/15/2021 Not Detected   03/02/2021 Not Detected   02/22/2021 Not Detected   01/29/2021 Not Detected     SARS-CoV-2 RNA, Amplification, Qual (no units)   Date Value   02/03/2021 Negative     POC Rapid COVID (no units)   Date Value   06/15/2022 Negative   02/18/2021 Negative   12/08/2020 Negative     CTA Runoff ABD PEL Bilat Lower Ext  Narrative: EXAMINATION:  CTA RUNOFF ABD PEL BILAT LOWER EXT    CLINICAL HISTORY:  Claudication or leg ischemia;Peripheral arterial disease (PAD), asymptomatic;    TECHNIQUE:  CT angiogram of the abdomen/pelvis with lower extremity runoff using xyz mL of  Omnipaque 350 IV contrast.  Multiplanar reconstructions performed.    COMPARISON:  CT abdomen pelvis 02/03/2012    FINDINGS:  Upper extremities within the gantry resulting in beam hardening artifact limiting assessment.    Abdominopelvic  Vasculature:    Visualized aorta demonstrates mild calcific atherosclerosis with no convincing evidence of dissection, aneurysm, high-grade stenosis.    Renal, superior mesenteric, and celiac arteries are patent.  GEORGIE is not well assessed due to artifact and poor opacification however appears patent with possible mild to moderate narrowing at the origin.    Common and external iliac arteries demonstrate no hemodynamically significant stenosis.  Bilateral internal iliac arteries demonstrate calcific and noncalcific atherosclerosis likely resulting in areas of moderate narrowing not well assessed due to artifact and poor opacification.    Right lower extremity:    Right common femoral and proximal superficial femoral arteries demonstrate no hemodynamically significant stenosis.  Right DFA with probable multifocal high-grade narrowing/occlusions versus poor opacification.  Moderate atherosclerotic disease of the mid to distal SFA with suspected areas of moderate to high-grade narrowing difficult to assess due to poor opacification.    Right popliteal artery with suspected multifocal moderate to high-grade narrowing limited assessment due to poor opacification.    Moderate narrowing at the trifurcation.    Suspected moderate narrowing at the peroneal origin with severe atherosclerotic disease and probable occlusions distally.    Severe narrowing at the origin of the posterior tibial artery with multifocal severe atherosclerotic disease distally limiting assessment.    Suspected high-grade narrowing at the origin of the anterior tibial artery, otherwise patent.    Left lower extremity:    Left common femoral and superficial femoral arteries are likely patent.  Left DFA with suspected multifocal high-grade narrowing/occlusions versus poor opacification.    Suspected multifocal occlusions of the distal left popliteal artery.  Trifurcation with high-grade narrowing.    Severe atherosclerotic disease of the posterior tibial  artery and suspected occlusion of the origin.    Multifocal occlusions or high-grade narrowing of the peroneal artery.    Anterior tibial artery with areas of patency otherwise there is severe atherosclerotic disease with multifocal high-grade narrowing and possible short-segment occlusions.    Bilateral lower extremities demonstrate diffuse subcutaneous edema and left-sided asymmetric muscular atrophy compared to the right.  Advanced degenerative changes of the knees.    Inferior Mediastinum/Heart: Normal.    Lung Bases: No nodules or consolidation.    Peritoneal Space: No ascites. No free air.    Liver: No focal mass.    Gallbladder: Tiny suspected gas foci within the gallbladder neck lumen which may suggest stones containing gas.    Bile Ducts: No evidence of dilated ducts.    Pancreas: No mass or peripancreatic fat stranding.    Spleen: Normal.    Adrenals: Unremarkable.    Bowel/Mesentery: No evidence of bowel obstruction or inflammation.  Diverticulosis coli.    Urinary Tract: No evidence of obstructive uropathy. Kidneys enhance symmetrically.  Left renal 1.8 cm cyst.    Retroperitoneum:  No significant adenopathy.    Pelvis:  Prostatomegaly.    Thoracic/Abdominal wall:  Gynecomastia bilaterally.    Bones: Moderate degenerative changes of the spine with multilevel mild endplate compression deformities of the lumbar spine unchanged.  Impression: Poor contrast opacification of the vasculature limits assessment.    Severe atherosclerotic disease of the vasculature.  Multifocal high-grade narrowing or occlusions of the calf vessels.  Vascular narrowing/occlusions may be overestimated due to poor opacification.    Electronically signed by resident: Jaylan Griffin  Date:    06/16/2022  Time:    15:08    Electronically signed by: Eric Carlos MD  Date:    06/16/2022  Time:    16:18  VAS Toe Brachial Indices Resting  Indication  ========    Peripheral Arterial Disease    Pressure Lower  ============    Rt brachial A  syst BP  178 mmHg  Rt PTA BP  255 mmHg  Rt dors pedis A  mmHg  Rt toe BP  130 mmHg  Right MARIO ratio use:   post. tibial  Rt MARIO post tibial (rt post tib A BP / max brach A BP) 1.43  Rt MARIO (rt dors ped A BP / max brach A BP) 1.43  Rt ankle BP / max brach A BP   1.43  Rt TBI (rt toe BP / max brach A BP)    0.73  Lt PTA BP  255 mmHg  Left MARIO ratio use:    post. tibial  Lt MARIO (lt post tibial A BP / max brach A BP)  1.43  Lt ankle BP / max brach A BP   1.43    PPG Lower  =========    Rt toe BP - PPG    130 mmHg  Rt toe digit waveform: severely dampened    Impression  =========    Right Leg: Segmental pressures are unreliable due to medial calcinosis; however, PVR waveforms suggest mild peripheral arterial  occlusive disease.  Left Leg: Segmental pressures are unreliable due to medial calcinosis; however, PVR waveforms suggest mild peripheral arterial  occlusive disease. Absent DPA signal.    -Bilateral Toe PPG's and Pressures were performed.  Rt lower digits: Toe PPG waveforms as described above. - TBI of 0.73 with a Great toe pressure of 130 mmHg mmHg is noted.  Lt lower digits: History of foot amputation.    DATE OF SERVICE: 06/16/2022                                                      Sonographer: Leatha Devi RDMS, RVT  Electronically Signed by: ADAM Jauregui M.D. at 06/16/2022-10:53      Labs and Imaging within the last 24 hours listed above were reviewed.       Diet: Diet diabetic [facility]; 2000 Calorie; Cardiac (Low Na/Chol)  Significant LDAs:   IV Access Type: Peripheral  Urinary Catheter Indication if present: Patient Does Not Have Urinary Catheter  Other Lines/Tubes/Drains:    HIGH RISK CONDITION(S):   Patient has a condition that poses threat to life and bodily function: ischemic limb likely in need of amputation     Goals of Care:    Previous admission:  2/17/21  Likely prognosis:  Fair  Code Status: Full Code  Comfort Only: No  Hospice: No  Goals at discharge: remain at home, with  physician follow-up    Discharge Planning:  OXANA: 6/22/2022     Code Status: Full Code   Is the patient medically ready for discharge?: No    Reason for patient still in hospital (select all that apply): Patient trending condition, Treatment, Consult recommendations, and Pending disposition  Discharge Plan A: Home with family       Assessment/Plan:      * Osteomyelitis of left lower extremity  -With acute on chronic osteomyleitis of LLE including heel with purulent foul smelling drainage  -Podiatry consulted, Not septic appearing. S/p bone biopsy.  -ID consulted. Cultures growing Staph aureus and GBS   - L foot wound anaerobic culture growing Prevotella and Porphyromonas Somerae. PO Flagyl added.    - L foot bone culture growing Staph species, continuing IV ceftriaxone and vancomycin (per Pharmacy dosing).    Acute renal insufficiency  Per med history, patient no longer taking ARB.  sCr up slightly and vanco level > 20.  Discontinuing ARB, holding diuretic for now. Trial of gentle hydration.    Peripheral arterial disease  -PVD contributing to current non-healing foot wound. Arterial US shows LLE atherosclerotic disease, areas of high-grade stenosis within the L popliteal artery  -Continue home statin, ASA  -Vascular Surgery consulted - patient considering surgery (BKA); seems reluctant primarily due to the time he expects it to take to obtain a prosthetic limb. Wants to try conservative management with antibiotics.    Asymptomatic Mobitz (type) I (Wenckebach's) atrioventricular block  -Bradycardic in ED but asymptomatic, chronic issue  -Repeat EKG 6/19:  BPM 44 Normal sinus rhythm - AV block, 2:1 - Nonspecific T wave abnormality     Chronic kidney disease, stage III (moderate)  -sCr baseline 1.1  -Continue to monitor    Type 2 diabetes, uncontrolled, with neuropathy  -Last A1c 2020, repeat > 9%. Per patient, taking 68 units Lantus QHS along with metformin  mg and glipizide 10 mg daily (Possibly, he is not  sure)  -Mildly hypoglycemic on admit with BG 63  -decreased dose of basal insulin to 15 units Levemir, Titrate up as needed. Added prandial insulin 6/19  -worsening hyperglycemia as oral agents wash out. Levemir increased to BID and aspart increased with meals.    Essential hypertension  Continue losartan 100mg and HCTZ 25 mg, with hydralazine 25 mg PO q8h PRN  Added nifedipine 6/18  Consulted PharmD for med history.  Discontinue ARB, hold HCTZ due to increasing aCr. Continue nifedipine for BP control.        Active Hospital Problems    Diagnosis  POA    *Osteomyelitis of left lower extremity [M86.9]  Yes    Acute renal insufficiency [N28.9]  No    Foot pain, left [M79.672]  Yes    Peripheral arterial disease [I73.9]  Yes     Chronic    Asymptomatic Mobitz (type) I (Wenckebach's) atrioventricular block [I44.1]  Yes    Chronic kidney disease, stage III (moderate) [N18.30]  Yes    Type 2 diabetes, uncontrolled, with neuropathy [E11.40, E11.65]  Yes     Chronic    Essential hypertension [I10]  Yes     Chronic      Resolved Hospital Problems   No resolved problems to display.       Inpatient Medications Prescribed for Management of Current Problems:     Scheduled Meds:    aluminum-magnesium hydroxide-simethicone  30 mL Oral QID (AC & HS)    aspirin  81 mg Oral Daily    atorvastatin  80 mg Oral Daily    cefTRIAXone (ROCEPHIN) IVPB  2 g Intravenous Q24H    gabapentin  200 mg Oral BID    insulin aspart U-100  10 Units Subcutaneous TIDWM    insulin detemir U-100  15 Units Subcutaneous BID    metroNIDAZOLE  500 mg Oral Q8H    [START ON 6/21/2022] NIFEdipine  60 mg Oral Daily    sucralfate  1 g Oral Q6H    [START ON 6/21/2022] tamsulosin  0.4 mg Oral Daily     Continuous Infusions:    sodium chloride 0.9%       As Needed: acetaminophen, albuterol-ipratropium, dextrose 10%, dextrose 10%, glucagon (human recombinant), glucose, glucose, hydrALAZINE, insulin aspart U-100, melatonin, naloxone, ondansetron,  oxyCODONE-acetaminophen, prochlorperazine, sars-cov-2 (covid-19), sodium chloride 0.9%, vancomycin - pharmacy to dose    VTE Risk Mitigation (From admission, onward)         Ordered     IP VTE HIGH RISK PATIENT  Once         06/15/22 1957     Place sequential compression device  Until discontinued         06/15/22 1957              I have assessed these finding virtually using telemed platform and with assistance of bedside nurse     The attending portion of this evaluation, treatment, and documentation was performed per Salma Strauss MD via Telemedicine AudioVisual using the secure [employer] software platform with 2 way audio/video. The provider was located off-site and the patient is located in the hospital. The aforementioned video software was utilized to document the relevant history and physical exam.    Salma Strauss MD  Department of Hospital Medicine   [facility] - Telemetry Stepdown

## 2022-06-21 NOTE — ASSESSMENT & PLAN NOTE
Continue losartan 100mg and HCTZ 25 mg, with hydralazine 25 mg PO q8h PRN  Added nifedipine 6/18  Consulted PharmD for med history.  Discontinue ARB, hold HCTZ due to increasing aCr. Continue nifedipine for BP control.

## 2022-06-21 NOTE — PT/OT/SLP EVAL
Occupational Therapy   Evaluation/tx    Name: Saji Castañeda  MRN: [id number]  Admitting Diagnosis:  Osteomyelitis of left lower extremity  Recent Surgery: * No surgery found *      Recommendations:     Discharge Recommendations: nursing facility, skilled  Discharge Equipment Recommendations:  wheelchair, bedside commode, walker, rolling  Barriers to discharge:  Decreased caregiver support, Inaccessible home environment (sister in hospital; steps to enter)    Assessment:     Saji Castañeda is a 72 y.o. male with a medical diagnosis of Osteomyelitis of left lower extremity.  He presents with deficits in functional mobility and self-care. Pt. agreeable to therapy session and motivated. Pt. Requires Min A for transfer from sit to stand with RW and PWB to LLE heel only.  Pt. Is a high fall risk at this time and would benefit on safety training with ADL task performance. Pt. Is recommended for SNF on d/c from acute care setting.  Performance deficits affecting function: weakness, impaired endurance, impaired self care skills, impaired functional mobilty, gait instability, decreased lower extremity function, decreased upper extremity function, decreased ROM.      Rehab Prognosis: Good; patient would benefit from acute skilled OT services to address these deficits and reach maximum level of function.       Plan:     Patient to be seen 3 x/week to address the above listed problems via self-care/home management, therapeutic activities, therapeutic exercises  · Plan of Care Expires: 07/21/22  · Plan of Care Reviewed with: patient    Subjective     Chief Complaint: reported he keeps going to the bathroom. Nurse notified.   Patient/Family Comments/goals: to get better at moving around     Occupational Profile:  Living Environment: Pt. Re Pt. Reported transferring self to toilet; washing with basin in his w/csides in ss house with 2 sisters one of which is currently in the hospital.  Pt. Has 3 steps to enter the house and BHR.  Pt.  Has been using rails to get into house and also to lift and manage his w/c into his home.    Previous level of function: Pt. Reported transferring self to toilet; washing with basin in his w/c  Roles and Routines: caretaker of self, brother  Equipment Used at Home:  wheelchair, walker, standard  Current wheelchair is in POOR condition  Assistance upon Discharge: one sister may be able to assist some    Pain/Comfort:  · Pain Rating 1: 0/10  · Pain Rating Post-Intervention 1: 0/10    Patients cultural, spiritual, Amish conflicts given the current situation: no    Objective:     Communicated with: nurse prior to session.  Patient found supine with peripheral IV, telemetry upon OT entry to room.    General Precautions: Standard, fall, contact  diabetic  Orthopedic Precautions:LLE partial weight bearing (through heel for transfers only)   Braces:  (has darco shoe he has been wearing on RLE per patient)  Respiratory Status: Room air    Occupational Performance:    Bed Mobility:    · Patient completed Supine to Sit with minimum assistance  · Patient completed Sit to Supine with minimum assistance    Functional Mobility/Transfers:  · Patient completed Sit <> Stand Transfer with minimum assistance  with  rolling walker  and PWB through left heel  · Functional Mobility: not tested  · Activities of Daily Living:  · Upper Body Dressing: minimum assistance to don fresh gown  · Toileting: total assistance for cleaning    Cognitive/Visual Perceptual:  Cognitive/Psychosocial Skills:     -       Oriented to: Person, Place, Time and Situation   -       Follows Commands/attention:Follows multistep  commands  -       Communication: clear/fluent  -       Memory: No Deficits noted  -       Safety awareness/insight to disability: some decreased insight noted   -       Mood/Affect/Coping skills/emotional control: Appropriate to situation  Visual/Perceptual:      -Intact .    Physical Exam:  Balance: -       sit: good; stand:  poor  Postural examination/scapula alignment:    -       Rounded shoulders  -       Forward head  -       Posterior pelvic tilt  Skin integrity: LLE with ace wrap covering effected region  Edema:  Moderate in RUE  Dominant hand: -       right  Upper Extremity Range of Motion:     -       Right Upper Extremity: WFL  -       Left Upper Extremity: WNL   Strength:    -       Right Upper Extremity: WFL  -       Left Upper Extremity: WFL    AMPAC 6 Click ADL:  AMPAC Total Score: 17    Treatment & Education:  Pt. Educated on role of OT and POC  Pt. Educated on safety with mobility and weight bearing precautions to LLE per orders  Education:    Patient left supine with all lines intact, call button in reach and bed alarm on    GOALS:   Multidisciplinary Problems     Occupational Therapy Goals        Problem: Occupational Therapy    Goal Priority Disciplines Outcome Interventions   Occupational Therapy Goal     OT, PT/OT     Description: Goals to be met by: 07-02-22     Patient will increase functional independence with ADLs by performing:    UE Dressing with Set-up Assistance.  LE Dressing with Minimal Assistance.  Grooming while seated with Set-up Assistance.  Toileting from bedside commode with Stand-by Assistance for hygiene and clothing management.   Supine to sit with Stand-by Assistance.  Stand pivot transfers with Stand-by Assistance with RW and PWB in left heel only  Toilet transfer to bedside commode with Stand-by Assistance.  Pt. To perform scoot pivot transfer with Supervision to and from the wheelchair                     History:     Past Medical History:   Diagnosis Date    Arthritis     legs    Diabetes mellitus     Diabetes mellitus, type 2     Hyperlipidemia     Osteomyelitis        Past Surgical History:   Procedure Laterality Date    ANGIOGRAPHY OF LOWER EXTREMITY N/A 2/3/2021    Procedure: Angiogram Extremity Bilateral;  Surgeon: Ernst Chacko MD;  Location: [facility] OR [address] [place] [address];  Service:  Peripheral Vascular;  Laterality: N/A;  7.4 mintues fluoroscopy time  816.15 mGy  170.17 Gycm2    AORTOGRAPHY WITH EXTREMITY RUNOFF Bilateral 2/3/2021    Procedure: AORTOGRAM, WITH EXTREMITY RUNOFF;  Surgeon: Ernst Chacko MD;  Location: [facility] OR [address] [place] [address];  Service: Peripheral Vascular;  Laterality: Bilateral;    DEBRIDEMENT OF FOOT Left 2/23/2021    Procedure: DEBRIDEMENT, LEFT HEEL;  Surgeon: Mayra Schroeder DPM;  Location: [facility] OR [address] [place] [address];  Service: Podiatry;  Laterality: Left;    FOOT AMPUTATION  October 2010    left high midfoot amputation       Time Tracking:     OT Date of Treatment: 06/21/22  OT Start Time: 0929  OT Stop Time: 1002  OT Total Time (min): 33 min    Billable Minutes:Evaluation 15  Self Care/Home Management 18    6/21/2022

## 2022-06-21 NOTE — PLAN OF CARE
Problem: Adult Inpatient Plan of Care  Goal: Plan of Care Review  Outcome: Ongoing, Progressing  Goal: Patient-Specific Goal (Individualized)  Outcome: Ongoing, Progressing  Goal: Absence of Hospital-Acquired Illness or Injury  Outcome: Ongoing, Progressing  Goal: Optimal Comfort and Wellbeing  Outcome: Ongoing, Progressing  Goal: Readiness for Transition of Care  Outcome: Ongoing, Progressing     Alert and oriented x4. Vitals stable. Free from falls and injuries during shift. No concerns or requests voiced. Bed low with side rails up x2. Call bell within reach. Will continue plan of care.

## 2022-06-21 NOTE — SUBJECTIVE & OBJECTIVE
Interval History: NAEON. Afebrile. No leukocytosis. Patient tolerating antibiotics. Denies fevers, chills, sweats, itching, rash. Reports loose stools with miralax. Discussed amputation today. Patient is nonambulatory. He is very independent at home and transfers himself. His main concern about amputation appears to be getting fitted for a prosthetic limb and he would like more information on this. He likes the idea of achieving source control with amputation and seems more open to this. Discussed with Dr. Strauss.     Review of Systems   Constitutional:  Negative for chills, diaphoresis, fatigue and fever.   Respiratory:  Negative for cough and shortness of breath.    Cardiovascular:  Negative for chest pain and leg swelling.   Gastrointestinal:  Positive for diarrhea. Negative for abdominal pain and vomiting.   Genitourinary:  Negative for difficulty urinating.   Musculoskeletal:  Positive for gait problem. Negative for arthralgias, joint swelling and myalgias.   Skin:  Positive for wound. Negative for color change.   Neurological:  Positive for numbness. Negative for dizziness and headaches.   Psychiatric/Behavioral:  Negative for agitation and confusion. The patient is not nervous/anxious.    Objective:     Vital Signs (Most Recent):  Temp: 98.3 °F (36.8 °C) (06/21/22 1145)  Pulse: 63 (06/21/22 1145)  Resp: (!) 24 (06/21/22 1145)  BP: (!) 176/76 (06/21/22 1145)  SpO2: 96 % (06/21/22 1145)   Vital Signs (24h Range):  Temp:  [98.1 °F (36.7 °C)-98.6 °F (37 °C)] 98.3 °F (36.8 °C)  Pulse:  [] 63  Resp:  [16-24] 24  SpO2:  [91 %-96 %] 96 %  BP: (128-187)/(62-87) 176/76     Weight: 124.3 kg (274 lb 0.5 oz)  Body mass index is 40.47 kg/m².    Estimated Creatinine Clearance: 61.2 mL/min (based on SCr of 1.4 mg/dL).    Physical Exam  Constitutional:       General: He is not in acute distress.     Appearance: He is obese. He is not ill-appearing, toxic-appearing or diaphoretic.   HENT:      Head: Normocephalic.       Nose: Nose normal.      Mouth/Throat:      Mouth: Mucous membranes are moist.      Pharynx: Oropharynx is clear.   Eyes:      Conjunctiva/sclera: Conjunctivae normal.   Cardiovascular:      Rate and Rhythm: Normal rate and regular rhythm.   Pulmonary:      Effort: Pulmonary effort is normal. No respiratory distress.      Breath sounds: Normal breath sounds.   Abdominal:      General: Abdomen is flat. There is no distension.      Palpations: Abdomen is soft.      Tenderness: There is no abdominal tenderness.   Musculoskeletal:         General: Swelling and deformity (L foot amputation) present.      Right lower leg: Edema present.      Left lower leg: Edema present.   Skin:     General: Skin is warm and dry.      Comments: Left heel wound dressed. See photo in media tab   Neurological:      Mental Status: He is alert and oriented to person, place, and time. Mental status is at baseline.      Sensory: Sensory deficit present.      Motor: No weakness.   Psychiatric:         Mood and Affect: Mood normal.         Behavior: Behavior normal.       Significant Labs:   Microbiology Results (last 7 days)       Procedure Component Value Units Date/Time    Aerobic culture [[id number]]  (Abnormal) Collected: 06/16/22 1308    Order Status: Completed Specimen: Bone from Foot, Left Updated: 06/21/22 0737     Aerobic Bacterial Culture STAPHYLOCOCCUS SPECIES  Rare  Identification and susceptibility pending      Blood culture x two cultures. Draw prior to antibiotics. [[id number]] Collected: 06/15/22 1633    Order Status: Completed Specimen: Blood from Peripheral, Antecubital, Right Updated: 06/20/22 1812     Blood Culture, Routine No growth after 5 days.    Narrative:      Aerobic and anaerobic    Blood culture x two cultures. Draw prior to antibiotics. [[id number]] Collected: 06/15/22 1634    Order Status: Completed Specimen: Blood from Peripheral, Antecubital, Left Updated: 06/20/22 1812     Blood Culture, Routine No growth after 5  days.    Narrative:      Aerobic and anaerobic    Culture, Anaerobe [[id number]]  (Abnormal) Collected: 06/16/22 1305    Order Status: Completed Specimen: Wound from Foot, Left Updated: 06/20/22 1209     Anaerobic Culture PRESUMPTIVE PREVOTELLA/PORPHYROMONAS GROUP  Rare        PORPHYROMONAS SOMERAE  Rare      Culture, Anaerobe [[id number]] Collected: 06/16/22 1308    Order Status: Completed Specimen: Bone from Foot, Left Updated: 06/20/22 0736     Anaerobic Culture No anaerobes isolated    Aerobic culture [[id number]]  (Abnormal)  (Susceptibility) Collected: 06/16/22 1305    Order Status: Completed Specimen: Wound from Foot, Left Updated: 06/18/22 1124     Aerobic Bacterial Culture STREPTOCOCCUS AGALACTIAE (GROUP B)  Moderate  Beta-hemolytic streptococci are routinely susceptible to   penicillins,cephalosporins and carbapenems.  Susceptibility testing not routinely performed        METHICILLIN RESISTANT STAPHYLOCOCCUS AUREUS  Moderate      Urine culture [[id number]] Collected: 06/15/22 1817    Order Status: Completed Specimen: Urine Updated: 06/16/22 2030     Urine Culture, Routine No growth    Narrative:      Specimen Source->Urine    Gram stain [[id number]] Collected: 06/16/22 1308    Order Status: Completed Specimen: Bone from Foot, Left Updated: 06/16/22 1813     Gram Stain Result No WBC's      No organisms seen    Gram stain [[id number]] Collected: 06/16/22 1305    Order Status: Completed Specimen: Wound from Foot, Left Updated: 06/16/22 1803     Gram Stain Result Rare WBC's      Rare Gram positive cocci          Recent Lab Results  (Last 5 results in the past 24 hours)        06/21/22  1136   06/21/22  0944   06/21/22  0817   06/20/22  2104   06/20/22  1731        POCT Glucose 264     228   235   210       Vancomycin, Random   23.8                                    Significant Imaging: I have reviewed all pertinent imaging results/findings within the past 24 hours.

## 2022-06-21 NOTE — ASSESSMENT & PLAN NOTE
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 S/p bone biopsy.  -ID consulted. Cultures growing Staph aureus and GBS   - L foot wound anaerobic culture growing Prevotella and Porphyromonas Somerae. PO Flagyl added.    - L foot bone culture growing Staph species, continuing IV ceftriaxone and vancomycin (per Pharmacy dosing).

## 2022-06-21 NOTE — PLAN OF CARE
Problem: Adult Inpatient Plan of Care  Goal: Plan of Care Review  Outcome: Ongoing, Progressing  Goal: Patient-Specific Goal (Individualized)  Outcome: Ongoing, Progressing  Goal: Absence of Hospital-Acquired Illness or Injury  Outcome: Ongoing, Progressing  Goal: Optimal Comfort and Wellbeing  Outcome: Ongoing, Progressing  POC reviewed with pt. A&Ox4. PRN medication administered for hypertension. Wound care complete. BG monitored. Safety checks performed. Bed in lowest position. Wheels locked. Call light in reach. WCTM.

## 2022-06-21 NOTE — PLAN OF CARE
[facility] - Telemetry Stepdown  Discharge Reassessment    Primary Care Provider: Mart Escalante MD    Expected Discharge Date: 6/22/2022    Reassessment (most recent)     Discharge Reassessment - 06/21/22 1451        Discharge Reassessment    Assessment Type Discharge Planning Reassessment     Did the patient's condition or plan change since previous assessment? Yes     Discharge Plan discussed with: Patient     Discharge Plan A Skilled Nursing Facility     Discharge Plan B Home Health     DME Needed Upon Discharge  other (see comments)   TBD    Why the patient remains in the hospital Requires continued medical care        Post-Acute Status    Post-Acute Authorization Placement     Post-Acute Placement Status Referrals Sent               Barb Brown RN  Ext [phone]

## 2022-06-22 LAB
ALBUMIN SERPL BCP-MCNC: 2.6 G/DL (ref 3.5–5.2)
ALP SERPL-CCNC: 76 U/L (ref 55–135)
ALT SERPL W/O P-5'-P-CCNC: 10 U/L (ref 10–44)
ANION GAP SERPL CALC-SCNC: 7 MMOL/L (ref 8–16)
AST SERPL-CCNC: 15 U/L (ref 10–40)
BASOPHILS # BLD AUTO: 0.03 K/UL (ref 0–0.2)
BASOPHILS NFR BLD: 0.5 % (ref 0–1.9)
BILIRUB SERPL-MCNC: 0.2 MG/DL (ref 0.1–1)
BUN SERPL-MCNC: 26 MG/DL (ref 8–23)
CALCIUM SERPL-MCNC: 9 MG/DL (ref 8.7–10.5)
CHLORIDE SERPL-SCNC: 100 MMOL/L (ref 95–110)
CO2 SERPL-SCNC: 30 MMOL/L (ref 23–29)
CREAT SERPL-MCNC: 1.7 MG/DL (ref 0.5–1.4)
DIFFERENTIAL METHOD: ABNORMAL
EOSINOPHIL # BLD AUTO: 0.5 K/UL (ref 0–0.5)
EOSINOPHIL NFR BLD: 8.1 % (ref 0–8)
ERYTHROCYTE [DISTWIDTH] IN BLOOD BY AUTOMATED COUNT: 16.6 % (ref 11.5–14.5)
EST. GFR  (AFRICAN AMERICAN): 45.2 ML/MIN/1.73 M^2
EST. GFR  (NON AFRICAN AMERICAN): 39.1 ML/MIN/1.73 M^2
GLUCOSE SERPL-MCNC: 256 MG/DL (ref 70–110)
HCT VFR BLD AUTO: 33 % (ref 40–54)
HGB BLD-MCNC: 10.2 G/DL (ref 14–18)
IMM GRANULOCYTES # BLD AUTO: 0.02 K/UL (ref 0–0.04)
IMM GRANULOCYTES NFR BLD AUTO: 0.3 % (ref 0–0.5)
LYMPHOCYTES # BLD AUTO: 1.3 K/UL (ref 1–4.8)
LYMPHOCYTES NFR BLD: 19.9 % (ref 18–48)
MCH RBC QN AUTO: 25.2 PG (ref 27–31)
MCHC RBC AUTO-ENTMCNC: 30.9 G/DL (ref 32–36)
MCV RBC AUTO: 82 FL (ref 82–98)
MONOCYTES # BLD AUTO: 0.6 K/UL (ref 0.3–1)
MONOCYTES NFR BLD: 9.6 % (ref 4–15)
NEUTROPHILS # BLD AUTO: 3.9 K/UL (ref 1.8–7.7)
NEUTROPHILS NFR BLD: 61.6 % (ref 38–73)
NRBC BLD-RTO: 0 /100 WBC
PLATELET # BLD AUTO: 320 K/UL (ref 150–450)
PMV BLD AUTO: 9.3 FL (ref 9.2–12.9)
POCT GLUCOSE: 237 MG/DL (ref 70–110)
POCT GLUCOSE: 245 MG/DL (ref 70–110)
POCT GLUCOSE: 283 MG/DL (ref 70–110)
POCT GLUCOSE: 333 MG/DL (ref 70–110)
POTASSIUM SERPL-SCNC: 4.7 MMOL/L (ref 3.5–5.1)
PROT SERPL-MCNC: 6.7 G/DL (ref 6–8.4)
RBC # BLD AUTO: 4.05 M/UL (ref 4.6–6.2)
SODIUM SERPL-SCNC: 137 MMOL/L (ref 136–145)
VANCOMYCIN SERPL-MCNC: 16.6 UG/ML
WBC # BLD AUTO: 6.33 K/UL (ref 3.9–12.7)

## 2022-06-22 PROCEDURE — 27000207 HC ISOLATION

## 2022-06-22 PROCEDURE — 63600175 PHARM REV CODE 636 W HCPCS: Performed by: PHYSICIAN ASSISTANT

## 2022-06-22 PROCEDURE — 99233 PR SUBSEQUENT HOSPITAL CARE,LEVL III: ICD-10-PCS | Mod: 95,,, | Performed by: INTERNAL MEDICINE

## 2022-06-22 PROCEDURE — 99233 SBSQ HOSP IP/OBS HIGH 50: CPT | Mod: 95,,, | Performed by: INTERNAL MEDICINE

## 2022-06-22 PROCEDURE — 80053 COMPREHEN METABOLIC PANEL: CPT | Performed by: STUDENT IN AN ORGANIZED HEALTH CARE EDUCATION/TRAINING PROGRAM

## 2022-06-22 PROCEDURE — 99233 SBSQ HOSP IP/OBS HIGH 50: CPT | Mod: ,,, | Performed by: PHYSICIAN ASSISTANT

## 2022-06-22 PROCEDURE — 25000003 PHARM REV CODE 250: Performed by: PHYSICIAN ASSISTANT

## 2022-06-22 PROCEDURE — 97110 THERAPEUTIC EXERCISES: CPT

## 2022-06-22 PROCEDURE — 20600001 HC STEP DOWN PRIVATE ROOM

## 2022-06-22 PROCEDURE — 63600175 PHARM REV CODE 636 W HCPCS: Performed by: INTERNAL MEDICINE

## 2022-06-22 PROCEDURE — 25000003 PHARM REV CODE 250: Performed by: REGISTERED NURSE

## 2022-06-22 PROCEDURE — 25000003 PHARM REV CODE 250: Performed by: INTERNAL MEDICINE

## 2022-06-22 PROCEDURE — 97530 THERAPEUTIC ACTIVITIES: CPT

## 2022-06-22 PROCEDURE — 80202 ASSAY OF VANCOMYCIN: CPT | Performed by: INTERNAL MEDICINE

## 2022-06-22 PROCEDURE — 85025 COMPLETE CBC W/AUTO DIFF WBC: CPT | Performed by: STUDENT IN AN ORGANIZED HEALTH CARE EDUCATION/TRAINING PROGRAM

## 2022-06-22 PROCEDURE — 99233 PR SUBSEQUENT HOSPITAL CARE,LEVL III: ICD-10-PCS | Mod: ,,, | Performed by: PHYSICIAN ASSISTANT

## 2022-06-22 PROCEDURE — 25000003 PHARM REV CODE 250: Performed by: HOSPITALIST

## 2022-06-22 RX ORDER — INSULIN ASPART 100 [IU]/ML
12 INJECTION, SOLUTION INTRAVENOUS; SUBCUTANEOUS
Status: DISCONTINUED | OUTPATIENT
Start: 2022-06-23 | End: 2022-06-23

## 2022-06-22 RX ORDER — VANCOMYCIN HCL IN 5 % DEXTROSE 1G/250ML
1000 PLASTIC BAG, INJECTION (ML) INTRAVENOUS ONCE
Status: COMPLETED | OUTPATIENT
Start: 2022-06-22 | End: 2022-06-22

## 2022-06-22 RX ADMIN — TAMSULOSIN HYDROCHLORIDE 0.4 MG: 0.4 CAPSULE ORAL at 08:06

## 2022-06-22 RX ADMIN — ATORVASTATIN CALCIUM 80 MG: 20 TABLET, FILM COATED ORAL at 08:06

## 2022-06-22 RX ADMIN — INSULIN ASPART 1 UNITS: 100 INJECTION, SOLUTION INTRAVENOUS; SUBCUTANEOUS at 09:06

## 2022-06-22 RX ADMIN — METRONIDAZOLE 500 MG: 500 TABLET ORAL at 03:06

## 2022-06-22 RX ADMIN — DAPTOMYCIN 995 MG: 350 INJECTION, POWDER, LYOPHILIZED, FOR SOLUTION INTRAVENOUS at 06:06

## 2022-06-22 RX ADMIN — INSULIN ASPART 4 UNITS: 100 INJECTION, SOLUTION INTRAVENOUS; SUBCUTANEOUS at 11:06

## 2022-06-22 RX ADMIN — METRONIDAZOLE 500 MG: 500 TABLET ORAL at 09:06

## 2022-06-22 RX ADMIN — VANCOMYCIN HYDROCHLORIDE 1000 MG: 1 INJECTION, POWDER, LYOPHILIZED, FOR SOLUTION INTRAVENOUS at 09:06

## 2022-06-22 RX ADMIN — INSULIN ASPART 2 UNITS: 100 INJECTION, SOLUTION INTRAVENOUS; SUBCUTANEOUS at 05:06

## 2022-06-22 RX ADMIN — INSULIN ASPART 11 UNITS: 100 INJECTION, SOLUTION INTRAVENOUS; SUBCUTANEOUS at 05:06

## 2022-06-22 RX ADMIN — INSULIN ASPART 11 UNITS: 100 INJECTION, SOLUTION INTRAVENOUS; SUBCUTANEOUS at 08:06

## 2022-06-22 RX ADMIN — INSULIN ASPART 11 UNITS: 100 INJECTION, SOLUTION INTRAVENOUS; SUBCUTANEOUS at 11:06

## 2022-06-22 RX ADMIN — HYDRALAZINE HYDROCHLORIDE 25 MG: 25 TABLET, FILM COATED ORAL at 08:06

## 2022-06-22 RX ADMIN — NIFEDIPINE 90 MG: 30 TABLET, FILM COATED, EXTENDED RELEASE ORAL at 08:06

## 2022-06-22 RX ADMIN — METRONIDAZOLE 500 MG: 500 TABLET ORAL at 05:06

## 2022-06-22 RX ADMIN — CEFTRIAXONE 2 G: 2 INJECTION, SOLUTION INTRAVENOUS at 11:06

## 2022-06-22 RX ADMIN — GABAPENTIN 200 MG: 100 CAPSULE ORAL at 08:06

## 2022-06-22 RX ADMIN — Medication 1 CAPSULE: at 08:06

## 2022-06-22 RX ADMIN — INSULIN ASPART 3 UNITS: 100 INJECTION, SOLUTION INTRAVENOUS; SUBCUTANEOUS at 08:06

## 2022-06-22 RX ADMIN — ASPIRIN 81 MG: 81 TABLET, COATED ORAL at 08:06

## 2022-06-22 RX ADMIN — HYDRALAZINE HYDROCHLORIDE 25 MG: 25 TABLET, FILM COATED ORAL at 05:06

## 2022-06-22 NOTE — PROGRESS NOTES
[facility] - Telemetry Stepdown  Infectious Disease  Progress Note    Patient Name: Saji Castañeda  MRN: [id number]  Admission Date: 6/15/2022  Length of Stay: 7 days  Attending Physician: Salma Strauss MD  Primary Care Provider: Mart Escalante MD    Isolation Status: Contact  Assessment/Plan:      * Osteomyelitis of left lower extremity     72 year old male with history of DM2 with peripheral neuropathy, left midtarsal amputation 2010, left calcaneal osteomyelitis s/p treatment in the past who was admitted for infected left heel ulceration. Tib/fib xray of the left leg notable for osteomyelitis of the posterior calcaneus. Vascular studies concerning for left lower leg arterial stenosis and calf vessel narrowing/occulsions.    Wound culture is positive for GBS, MRSA, prevotella and porphromonas.   Bone culture is positive for Staph species. Pathology pending.     Patient is currently on ceftriaxone, vancomycin and flagyl. Fevers resolved. No leukocytosis. Vascular surgery recommends AKA vs BKA. Patient continues to weigh his decisions though appears amenable to BKA.  Mild YNES    Recommendations:  - DC Vancomycin given developing YNES.  - DC ceftriaxone as no GNR on cx and start Daptomycin as covers for MRSA and GBS on wound cx and Staph species on bone cx.  - Continue Flagyl for now given anaerobes on wound culture though none on bone cultures  - Follow up bone culture Staph Species and pathology results  - If patient undergoes a more proximal amputation, long term antibiotics will not be indicated - appear to be possibly amenable to BKA.   - Discussed that efficacy of IV abx in the setting of vascular dz of RLE and likely would have minimal benefit and ultimately may need amputation at some point.   - if refuses any sx, will need line for abx.  - Discussed with ID staff   - Will follow    Foot pain, left  See A/P above    Peripheral arterial disease     See assessment and plan below      Anticipated Disposition:  tana    Thank you for your consult. I will follow-up with patient. Please contact us if you have any additional questions.    JOHNNY Goel  Infectious Disease  [facility] - Telemetry Stepdown    Subjective:     Principal Problem:Osteomyelitis of left lower extremity    HPI: Mr. Castañeda is a 72 year old male with PMH of DM2 with peripheral neuropathy, left foot amputation at the midtarsal joint, osteomyelitis s/p left high midfoot amputation who was referred to [facility] ED following podiatry clinic evaluation of diabetic foot wound. Pt was seen in podiatry clinic on 6/7 and was found to chronic nonhealing left heal with nonviable tissues, BKA was discussed, but pt was not interested. He followed up one week later on 6/15 where the wound was found to be extremely malodorous, posterior leg ulceration with increased drainage and depth. The left hellp stump lesion was found to have increased exposed bone. Therefore the pt was sent to the ED for further eval and IV abx therapy.     To note, pt has minimal ability to ambulate at baseline, uses a wheelchair for locomotion.     Since admission, pt has remained hypertensive, with an isolated fever of 100.7 today on 6/17, remains on RA. No leukocytosis noted, elevated sed rate (>120), elevated CRP (27), low albumin noted, UA without concerns for infection. Blood cultures on 6/15 NGTD, urine culture NGTD. Wound culture from left foot + streptococcus agalactiae (GBS), staph aureus, sensitives pending. Bone culture from left foot pending.     Left lower leg arterial US was concerning for significant stenosis within the popliteal artery. Mild peripheral arterial disease noted on left/right leg ABIs. CTA significant for severe arthrosclerotic disease of the vasculature, narrowing/occlusions of the calf vessels. Tib/fib xray of the left leg notable for ulcer crater down to bone with osteomyelitis of the posterior calcaneus.    Pt is on cefepime and vancomycin. ID was consulted for  suspected osteomyelitis left calcaneus.           Interval History:   No AEON.  Afebrile and WBC WNL.  Cr up to 1.7  OR Bone cultures L foot - Staph Species  The patient denies any recent fever, chills, or sweats.      Review of Systems   Constitutional:  Negative for chills, diaphoresis, fatigue and fever.   Respiratory:  Negative for cough and shortness of breath.    Cardiovascular:  Negative for chest pain and leg swelling.   Gastrointestinal:  Positive for diarrhea. Negative for abdominal pain and vomiting.   Genitourinary:  Negative for difficulty urinating.   Musculoskeletal:  Positive for gait problem. Negative for arthralgias, joint swelling and myalgias.   Skin:  Positive for wound. Negative for color change.   Neurological:  Positive for numbness. Negative for dizziness and headaches.   Psychiatric/Behavioral:  Negative for agitation and confusion. The patient is not nervous/anxious.    Objective:     Vital Signs (Most Recent):  Temp: 98.5 °F (36.9 °C) (06/22/22 0736)  Pulse: (!) 46 (06/22/22 0853)  Resp: 18 (06/22/22 0736)  BP: (!) 163/74 (06/22/22 0736)  SpO2: (!) 92 % (06/22/22 0736)   Vital Signs (24h Range):  Temp:  [96.6 °F (35.9 °C)-98.5 °F (36.9 °C)] 98.5 °F (36.9 °C)  Pulse:  [46-78] 46  Resp:  [15-24] 18  SpO2:  [92 %-97 %] 92 %  BP: (161-176)/(69-77) 163/74     Weight: 124.3 kg (274 lb 0.5 oz)  Body mass index is 40.47 kg/m².    Estimated Creatinine Clearance: 50.4 mL/min (A) (based on SCr of 1.7 mg/dL (H)).    Physical Exam  Constitutional:       General: He is not in acute distress.     Appearance: He is obese. He is not ill-appearing, toxic-appearing or diaphoretic.   HENT:      Head: Normocephalic.      Nose: Nose normal.      Mouth/Throat:      Mouth: Mucous membranes are moist.      Pharynx: Oropharynx is clear.   Eyes:      Conjunctiva/sclera: Conjunctivae normal.   Cardiovascular:      Rate and Rhythm: Normal rate and regular rhythm.   Pulmonary:      Effort: Pulmonary effort is normal.  No respiratory distress.      Breath sounds: Normal breath sounds.   Abdominal:      General: Abdomen is flat. There is no distension.      Palpations: Abdomen is soft.      Tenderness: There is no abdominal tenderness.   Musculoskeletal:         General: Swelling and deformity (L foot amputation) present.      Right lower leg: Edema present.      Left lower leg: Edema present.   Skin:     General: Skin is warm and dry.      Comments: Left heel wound dressed. See photo in media tab   Neurological:      Mental Status: He is alert and oriented to person, place, and time. Mental status is at baseline.      Sensory: Sensory deficit present.      Motor: No weakness.   Psychiatric:         Mood and Affect: Mood normal.         Behavior: Behavior normal.                 Significant Labs:   Microbiology Results (last 7 days)       Procedure Component Value Units Date/Time    Aerobic culture [[id number]]  (Abnormal) Collected: 06/16/22 1308    Order Status: Completed Specimen: Bone from Foot, Left Updated: 06/21/22 0737     Aerobic Bacterial Culture STAPHYLOCOCCUS SPECIES  Rare  Identification and susceptibility pending      Blood culture x two cultures. Draw prior to antibiotics. [[id number]] Collected: 06/15/22 1633    Order Status: Completed Specimen: Blood from Peripheral, Antecubital, Right Updated: 06/20/22 1812     Blood Culture, Routine No growth after 5 days.    Narrative:      Aerobic and anaerobic    Blood culture x two cultures. Draw prior to antibiotics. [[id number]] Collected: 06/15/22 1634    Order Status: Completed Specimen: Blood from Peripheral, Antecubital, Left Updated: 06/20/22 1812     Blood Culture, Routine No growth after 5 days.    Narrative:      Aerobic and anaerobic    Culture, Anaerobe [[id number]]  (Abnormal) Collected: 06/16/22 1305    Order Status: Completed Specimen: Wound from Foot, Left Updated: 06/20/22 1209     Anaerobic Culture PRESUMPTIVE PREVOTELLA/PORPHYROMONAS GROUP  Rare         PORPHYROMONAS SOMERAE  Rare      Culture, Anaerobe [[id number]] Collected: 06/16/22 1308    Order Status: Completed Specimen: Bone from Foot, Left Updated: 06/20/22 0736     Anaerobic Culture No anaerobes isolated    Aerobic culture [[id number]]  (Abnormal)  (Susceptibility) Collected: 06/16/22 1305    Order Status: Completed Specimen: Wound from Foot, Left Updated: 06/18/22 1124     Aerobic Bacterial Culture STREPTOCOCCUS AGALACTIAE (GROUP B)  Moderate  Beta-hemolytic streptococci are routinely susceptible to   penicillins,cephalosporins and carbapenems.  Susceptibility testing not routinely performed        METHICILLIN RESISTANT STAPHYLOCOCCUS AUREUS  Moderate      Urine culture [[id number]] Collected: 06/15/22 1817    Order Status: Completed Specimen: Urine Updated: 06/16/22 2030     Urine Culture, Routine No growth    Narrative:      Specimen Source->Urine    Gram stain [[id number]] Collected: 06/16/22 1308    Order Status: Completed Specimen: Bone from Foot, Left Updated: 06/16/22 1813     Gram Stain Result No WBC's      No organisms seen    Gram stain [[id number]] Collected: 06/16/22 1305    Order Status: Completed Specimen: Wound from Foot, Left Updated: 06/16/22 1803     Gram Stain Result Rare WBC's      Rare Gram positive cocci          Recent Lab Results  (Last 5 results in the past 24 hours)        06/22/22  0735   06/22/22  0416   06/21/22  2109   06/21/22  1732   06/21/22  1136        Albumin   2.6             Alkaline Phosphatase   76             ALT   10             Anion Gap   7             AST   15             Baso #   0.03             Basophil %   0.5             BILIRUBIN TOTAL   0.2  Comment: For infants and newborns, interpretation of results should be based  on gestational age, weight and in agreement with clinical  observations.    Premature Infant recommended reference ranges:  Up to 24 hours.............<8.0 mg/dL  Up to 48 hours............<12.0 mg/dL  3-5 days..................<15.0 mg/dL  6-29  days.................<15.0 mg/dL               BUN   26             Calcium   9.0             Chloride   100             CO2   30             Creatinine   1.7             Differential Method   Automated             eGFR if    45.2             eGFR if non    39.1  Comment: Calculation used to obtain the estimated glomerular filtration  rate (eGFR) is the CKD-EPI equation.                Eos #   0.5             Eosinophil %   8.1             Glucose   256             Gran # (ANC)   3.9             Gran %   61.6             Hematocrit   33.0             Hemoglobin   10.2             Immature Grans (Abs)   0.02  Comment: Mild elevation in immature granulocytes is non specific and   can be seen in a variety of conditions including stress response,   acute inflammation, trauma and pregnancy. Correlation with other   laboratory and clinical findings is essential.               Immature Granulocytes   0.3             Lymph #   1.3             Lymph %   19.9             MCH   25.2             MCHC   30.9             MCV   82             Mono #   0.6             Mono %   9.6             MPV   9.3             nRBC   0             Platelets   320             POCT Glucose 283     244   206   264       Potassium   4.7             PROTEIN TOTAL   6.7             RBC   4.05             RDW   16.6             Sodium   137             Vancomycin, Random   16.6             WBC   6.33                                    Significant Imaging: I have reviewed all pertinent imaging results/findings within the past 24 hours.  CTA Runoff ABD PEL Bilat Lower Ext [[id number]] Resulted: 06/16/22 1618   Order Status: Completed Updated: 06/16/22 1621   Narrative:     EXAMINATION:   CTA RUNOFF ABD PEL BILAT LOWER EXT     CLINICAL HISTORY:   Claudication or leg ischemia;Peripheral arterial disease (PAD), asymptomatic;     TECHNIQUE:   CT angiogram of the abdomen/pelvis with lower extremity runoff using xyz mL of   Omnipaque 350 IV contrast.  Multiplanar reconstructions performed.     COMPARISON:   CT abdomen pelvis 02/03/2012     FINDINGS:   Upper extremities within the gantry resulting in beam hardening artifact limiting assessment.     Abdominopelvic Vasculature:     Visualized aorta demonstrates mild calcific atherosclerosis with no convincing evidence of dissection, aneurysm, high-grade stenosis.     Renal, superior mesenteric, and celiac arteries are patent.  GEORGIE is not well assessed due to artifact and poor opacification however appears patent with possible mild to moderate narrowing at the origin.     Common and external iliac arteries demonstrate no hemodynamically significant stenosis.  Bilateral internal iliac arteries demonstrate calcific and noncalcific atherosclerosis likely resulting in areas of moderate narrowing not well assessed due to artifact and poor opacification.     Right lower extremity:     Right common femoral and proximal superficial femoral arteries demonstrate no hemodynamically significant stenosis.  Right DFA with probable multifocal high-grade narrowing/occlusions versus poor opacification.  Moderate atherosclerotic disease of the mid to distal SFA with suspected areas of moderate to high-grade narrowing difficult to assess due to poor opacification.     Right popliteal artery with suspected multifocal moderate to high-grade narrowing limited assessment due to poor opacification.     Moderate narrowing at the trifurcation.     Suspected moderate narrowing at the peroneal origin with severe atherosclerotic disease and probable occlusions distally.     Severe narrowing at the origin of the posterior tibial artery with multifocal severe atherosclerotic disease distally limiting assessment.     Suspected high-grade narrowing at the origin of the anterior tibial artery, otherwise patent.     Left lower extremity:     Left common femoral and superficial femoral arteries are likely patent.  Left DFA  with suspected multifocal high-grade narrowing/occlusions versus poor opacification.     Suspected multifocal occlusions of the distal left popliteal artery.  Trifurcation with high-grade narrowing.     Severe atherosclerotic disease of the posterior tibial artery and suspected occlusion of the origin.     Multifocal occlusions or high-grade narrowing of the peroneal artery.     Anterior tibial artery with areas of patency otherwise there is severe atherosclerotic disease with multifocal high-grade narrowing and possible short-segment occlusions.     Bilateral lower extremities demonstrate diffuse subcutaneous edema and left-sided asymmetric muscular atrophy compared to the right.  Advanced degenerative changes of the knees.     Inferior Mediastinum/Heart: Normal.     Lung Bases: No nodules or consolidation.     Peritoneal Space: No ascites. No free air.     Liver: No focal mass.     Gallbladder: Tiny suspected gas foci within the gallbladder neck lumen which may suggest stones containing gas.     Bile Ducts: No evidence of dilated ducts.     Pancreas: No mass or peripancreatic fat stranding.     Spleen: Normal.     Adrenals: Unremarkable.     Bowel/Mesentery: No evidence of bowel obstruction or inflammation.  Diverticulosis coli.     Urinary Tract: No evidence of obstructive uropathy. Kidneys enhance symmetrically.  Left renal 1.8 cm cyst.     Retroperitoneum:  No significant adenopathy.     Pelvis:  Prostatomegaly.     Thoracic/Abdominal wall:  Gynecomastia bilaterally.     Bones: Moderate degenerative changes of the spine with multilevel mild endplate compression deformities of the lumbar spine unchanged.    Impression:       Poor contrast opacification of the vasculature limits assessment.     Severe atherosclerotic disease of the vasculature.  Multifocal high-grade narrowing or occlusions of the calf vessels.  Vascular narrowing/occlusions may be overestimated due to poor opacification.     Electronically  signed by resident: Jaylan Griffin   Date: 06/16/2022   Time: 15:08     Electronically signed by: Eric Carlos MD   Date: 06/16/2022   Time: 16:18   VAS Toe Brachial Indices Resting [[id number]] Collected: 06/16/22 0948   Order Status: Completed Updated: 06/16/22 1056   Narrative:       Indication   ========     Peripheral Arterial Disease     Pressure Lower   ============     Rt brachial A syst BP  178 mmHg   Rt PTA BP  255 mmHg   Rt dors pedis A  mmHg   Rt toe BP  130 mmHg   Right MARIO ratio use:   post. tibial   Rt MARIO post tibial (rt post tib A BP / max brach A BP) 1.43   Rt MARIO (rt dors ped A BP / max brach A BP) 1.43   Rt ankle BP / max brach A BP   1.43   Rt TBI (rt toe BP / max brach A BP)    0.73   Lt PTA BP  255 mmHg   Left MARIO ratio use:    post. tibial   Lt MARIO (lt post tibial A BP / max brach A BP)  1.43   Lt ankle BP / max brach A BP   1.43     PPG Lower   =========     Rt toe BP - PPG    130 mmHg   Rt toe digit waveform: severely dampened     Impression   =========     Right Leg: Segmental pressures are unreliable due to medial calcinosis; however, PVR waveforms suggest mild peripheral arterial   occlusive disease.   Left Leg: Segmental pressures are unreliable due to medial calcinosis; however, PVR waveforms suggest mild peripheral arterial   occlusive disease. Absent DPA signal.     -Bilateral Toe PPG's and Pressures were performed.   Rt lower digits: Toe PPG waveforms as described above. - TBI of 0.73 with a Great toe pressure of 130 mmHg mmHg is noted.   Lt lower digits: History of foot amputation.       DATE OF SERVICE: 06/16/2022                                                        Sonographer: Leatha Devi RDMS, RVT   Electronically Signed by: ADAM Jauregui M.D. at 06/16/2022-10:53   US Lower Extremity Arteries Left [[id number]] (Abnormal) Resulted: 06/15/22 1808   Order Status: Completed Updated: 06/15/22 1811   Narrative:     EXAMINATION:   US LOWER EXTREMITY ARTERIES LEFT     CLINICAL  HISTORY:   Pain in leg, unspecified     TECHNIQUE:   Left lower extremity arterial duplex ultrasound examination performed. Multiple gray scale and color doppler images were obtained in addition to waveform analysis.     COMPARISON:   Ultrasound lower extremity arteries bilateral 12/09/2020     FINDINGS:   The peak systolic velocities on the left are as follows, in centimeters/second:     Common femoral artery: 174, triphasic     Superficial femoral artery, proximal: 130, triphasic     Superficial femoral artery, mid portion: 177, triphasic     Superficial femoral artery, distal: 144, triphasic     Popliteal artery: 140 proximally, monophasic and 89 distally, monophasic     Posterior tibial artery: 86, monophasic     Anterior tibial artery: 134, monophasic    Impression:       Monophasic waveform within the left distal lower extremity, consistent with atherosclerotic disease.  Areas of high-grade stenosis within the popliteal artery.     The left peroneal artery is not visualized, and may be occluded.     This report was flagged in Epic as abnormal.     Electronically signed by resident: Lissett Kearney   Date: 06/15/2022   Time: 17:59     Electronically signed by: Juan Hernandez MD   Date: 06/15/2022   Time: 18:08       Imaging History    2022  Date Procedure Name Study Review link PACS Link Status Accession Number Location   06/16/22 03:07 PM CTA Runoff ABD PEL Bilat Lower Ext  Study Review  Images Final [id number] [facility]   06/16/22 08:39 AM VAS Toe Brachial Indices Resting  Study Review  Images Final [id number]    06/15/22 05:28 PM US Lower Extremity Arteries Left  Study Review  Images Final [id number] [facility]   06/15/22 03:36 PM X-Ray Tibia Fibula 2 View Left  Study Review  Images Final [id number] [facility]

## 2022-06-22 NOTE — PROGRESS NOTES
VANCOMYCIN DOSING BY PHARMACY DISCONTINUATION NOTE    Saji Castañeda is a 72 y.o. male who had been consulted for vancomycin dosing.    The pharmacy consult for vancomycin dosing has been discontinued.     Vancomycin Dosing by Pharmacy Consult will sign-off. Please reconsult if necessary. Thank you for allowing us to participate in this patient's care.    Beronica Antunez PharmD  [id number]

## 2022-06-22 NOTE — SUBJECTIVE & OBJECTIVE
Interval History:   No AEON.  Afebrile and WBC WNL.  Cr up to 1.7  OR Bone cultures L foot - Staph Species  The patient denies any recent fever, chills, or sweats.      Review of Systems   Constitutional:  Negative for chills, diaphoresis, fatigue and fever.   Respiratory:  Negative for cough and shortness of breath.    Cardiovascular:  Negative for chest pain and leg swelling.   Gastrointestinal:  Positive for diarrhea. Negative for abdominal pain and vomiting.   Genitourinary:  Negative for difficulty urinating.   Musculoskeletal:  Positive for gait problem. Negative for arthralgias, joint swelling and myalgias.   Skin:  Positive for wound. Negative for color change.   Neurological:  Positive for numbness. Negative for dizziness and headaches.   Psychiatric/Behavioral:  Negative for agitation and confusion. The patient is not nervous/anxious.    Objective:     Vital Signs (Most Recent):  Temp: 98.5 °F (36.9 °C) (06/22/22 0736)  Pulse: (!) 46 (06/22/22 0853)  Resp: 18 (06/22/22 0736)  BP: (!) 163/74 (06/22/22 0736)  SpO2: (!) 92 % (06/22/22 0736)   Vital Signs (24h Range):  Temp:  [96.6 °F (35.9 °C)-98.5 °F (36.9 °C)] 98.5 °F (36.9 °C)  Pulse:  [46-78] 46  Resp:  [15-24] 18  SpO2:  [92 %-97 %] 92 %  BP: (161-176)/(69-77) 163/74     Weight: 124.3 kg (274 lb 0.5 oz)  Body mass index is 40.47 kg/m².    Estimated Creatinine Clearance: 50.4 mL/min (A) (based on SCr of 1.7 mg/dL (H)).    Physical Exam  Constitutional:       General: He is not in acute distress.     Appearance: He is obese. He is not ill-appearing, toxic-appearing or diaphoretic.   HENT:      Head: Normocephalic.      Nose: Nose normal.      Mouth/Throat:      Mouth: Mucous membranes are moist.      Pharynx: Oropharynx is clear.   Eyes:      Conjunctiva/sclera: Conjunctivae normal.   Cardiovascular:      Rate and Rhythm: Normal rate and regular rhythm.   Pulmonary:      Effort: Pulmonary effort is normal. No respiratory distress.      Breath sounds:  Normal breath sounds.   Abdominal:      General: Abdomen is flat. There is no distension.      Palpations: Abdomen is soft.      Tenderness: There is no abdominal tenderness.   Musculoskeletal:         General: Swelling and deformity (L foot amputation) present.      Right lower leg: Edema present.      Left lower leg: Edema present.   Skin:     General: Skin is warm and dry.      Comments: Left heel wound dressed. See photo in media tab   Neurological:      Mental Status: He is alert and oriented to person, place, and time. Mental status is at baseline.      Sensory: Sensory deficit present.      Motor: No weakness.   Psychiatric:         Mood and Affect: Mood normal.         Behavior: Behavior normal.                 Significant Labs:   Microbiology Results (last 7 days)       Procedure Component Value Units Date/Time    Aerobic culture [[id number]]  (Abnormal) Collected: 06/16/22 1308    Order Status: Completed Specimen: Bone from Foot, Left Updated: 06/21/22 0737     Aerobic Bacterial Culture STAPHYLOCOCCUS SPECIES  Rare  Identification and susceptibility pending      Blood culture x two cultures. Draw prior to antibiotics. [[id number]] Collected: 06/15/22 1633    Order Status: Completed Specimen: Blood from Peripheral, Antecubital, Right Updated: 06/20/22 1812     Blood Culture, Routine No growth after 5 days.    Narrative:      Aerobic and anaerobic    Blood culture x two cultures. Draw prior to antibiotics. [[id number]] Collected: 06/15/22 1634    Order Status: Completed Specimen: Blood from Peripheral, Antecubital, Left Updated: 06/20/22 1812     Blood Culture, Routine No growth after 5 days.    Narrative:      Aerobic and anaerobic    Culture, Anaerobe [[id number]]  (Abnormal) Collected: 06/16/22 1305    Order Status: Completed Specimen: Wound from Foot, Left Updated: 06/20/22 1209     Anaerobic Culture PRESUMPTIVE PREVOTELLA/PORPHYROMONAS GROUP  Rare        PORPHYROMONAS SOMERAE  Rare      Culture, Anaerobe  [[id number]] Collected: 06/16/22 1308    Order Status: Completed Specimen: Bone from Foot, Left Updated: 06/20/22 0736     Anaerobic Culture No anaerobes isolated    Aerobic culture [[id number]]  (Abnormal)  (Susceptibility) Collected: 06/16/22 1305    Order Status: Completed Specimen: Wound from Foot, Left Updated: 06/18/22 1124     Aerobic Bacterial Culture STREPTOCOCCUS AGALACTIAE (GROUP B)  Moderate  Beta-hemolytic streptococci are routinely susceptible to   penicillins,cephalosporins and carbapenems.  Susceptibility testing not routinely performed        METHICILLIN RESISTANT STAPHYLOCOCCUS AUREUS  Moderate      Urine culture [[id number]] Collected: 06/15/22 1817    Order Status: Completed Specimen: Urine Updated: 06/16/22 2030     Urine Culture, Routine No growth    Narrative:      Specimen Source->Urine    Gram stain [[id number]] Collected: 06/16/22 1308    Order Status: Completed Specimen: Bone from Foot, Left Updated: 06/16/22 1813     Gram Stain Result No WBC's      No organisms seen    Gram stain [[id number]] Collected: 06/16/22 1305    Order Status: Completed Specimen: Wound from Foot, Left Updated: 06/16/22 1803     Gram Stain Result Rare WBC's      Rare Gram positive cocci          Recent Lab Results  (Last 5 results in the past 24 hours)        06/22/22  0735   06/22/22  0416   06/21/22  2109   06/21/22  1732   06/21/22  1136        Albumin   2.6             Alkaline Phosphatase   76             ALT   10             Anion Gap   7             AST   15             Baso #   0.03             Basophil %   0.5             BILIRUBIN TOTAL   0.2  Comment: For infants and newborns, interpretation of results should be based  on gestational age, weight and in agreement with clinical  observations.    Premature Infant recommended reference ranges:  Up to 24 hours.............<8.0 mg/dL  Up to 48 hours............<12.0 mg/dL  3-5 days..................<15.0 mg/dL  6-29 days.................<15.0 mg/dL               BUN    26             Calcium   9.0             Chloride   100             CO2   30             Creatinine   1.7             Differential Method   Automated             eGFR if    45.2             eGFR if non    39.1  Comment: Calculation used to obtain the estimated glomerular filtration  rate (eGFR) is the CKD-EPI equation.                Eos #   0.5             Eosinophil %   8.1             Glucose   256             Gran # (ANC)   3.9             Gran %   61.6             Hematocrit   33.0             Hemoglobin   10.2             Immature Grans (Abs)   0.02  Comment: Mild elevation in immature granulocytes is non specific and   can be seen in a variety of conditions including stress response,   acute inflammation, trauma and pregnancy. Correlation with other   laboratory and clinical findings is essential.               Immature Granulocytes   0.3             Lymph #   1.3             Lymph %   19.9             MCH   25.2             MCHC   30.9             MCV   82             Mono #   0.6             Mono %   9.6             MPV   9.3             nRBC   0             Platelets   320             POCT Glucose 283     244   206   264       Potassium   4.7             PROTEIN TOTAL   6.7             RBC   4.05             RDW   16.6             Sodium   137             Vancomycin, Random   16.6             WBC   6.33                                    Significant Imaging: I have reviewed all pertinent imaging results/findings within the past 24 hours.  CTA Runoff ABD PEL Bilat Lower Ext [[id number]] Resulted: 06/16/22 1618   Order Status: Completed Updated: 06/16/22 1621   Narrative:     EXAMINATION:   CTA RUNOFF ABD PEL BILAT LOWER EXT     CLINICAL HISTORY:   Claudication or leg ischemia;Peripheral arterial disease (PAD), asymptomatic;     TECHNIQUE:   CT angiogram of the abdomen/pelvis with lower extremity runoff using xyz mL of  Omnipaque 350 IV contrast.  Multiplanar reconstructions  performed.     COMPARISON:   CT abdomen pelvis 02/03/2012     FINDINGS:   Upper extremities within the gantry resulting in beam hardening artifact limiting assessment.     Abdominopelvic Vasculature:     Visualized aorta demonstrates mild calcific atherosclerosis with no convincing evidence of dissection, aneurysm, high-grade stenosis.     Renal, superior mesenteric, and celiac arteries are patent.  GEORGIE is not well assessed due to artifact and poor opacification however appears patent with possible mild to moderate narrowing at the origin.     Common and external iliac arteries demonstrate no hemodynamically significant stenosis.  Bilateral internal iliac arteries demonstrate calcific and noncalcific atherosclerosis likely resulting in areas of moderate narrowing not well assessed due to artifact and poor opacification.     Right lower extremity:     Right common femoral and proximal superficial femoral arteries demonstrate no hemodynamically significant stenosis.  Right DFA with probable multifocal high-grade narrowing/occlusions versus poor opacification.  Moderate atherosclerotic disease of the mid to distal SFA with suspected areas of moderate to high-grade narrowing difficult to assess due to poor opacification.     Right popliteal artery with suspected multifocal moderate to high-grade narrowing limited assessment due to poor opacification.     Moderate narrowing at the trifurcation.     Suspected moderate narrowing at the peroneal origin with severe atherosclerotic disease and probable occlusions distally.     Severe narrowing at the origin of the posterior tibial artery with multifocal severe atherosclerotic disease distally limiting assessment.     Suspected high-grade narrowing at the origin of the anterior tibial artery, otherwise patent.     Left lower extremity:     Left common femoral and superficial femoral arteries are likely patent.  Left DFA with suspected multifocal high-grade narrowing/occlusions  versus poor opacification.     Suspected multifocal occlusions of the distal left popliteal artery.  Trifurcation with high-grade narrowing.     Severe atherosclerotic disease of the posterior tibial artery and suspected occlusion of the origin.     Multifocal occlusions or high-grade narrowing of the peroneal artery.     Anterior tibial artery with areas of patency otherwise there is severe atherosclerotic disease with multifocal high-grade narrowing and possible short-segment occlusions.     Bilateral lower extremities demonstrate diffuse subcutaneous edema and left-sided asymmetric muscular atrophy compared to the right.  Advanced degenerative changes of the knees.     Inferior Mediastinum/Heart: Normal.     Lung Bases: No nodules or consolidation.     Peritoneal Space: No ascites. No free air.     Liver: No focal mass.     Gallbladder: Tiny suspected gas foci within the gallbladder neck lumen which may suggest stones containing gas.     Bile Ducts: No evidence of dilated ducts.     Pancreas: No mass or peripancreatic fat stranding.     Spleen: Normal.     Adrenals: Unremarkable.     Bowel/Mesentery: No evidence of bowel obstruction or inflammation.  Diverticulosis coli.     Urinary Tract: No evidence of obstructive uropathy. Kidneys enhance symmetrically.  Left renal 1.8 cm cyst.     Retroperitoneum:  No significant adenopathy.     Pelvis:  Prostatomegaly.     Thoracic/Abdominal wall:  Gynecomastia bilaterally.     Bones: Moderate degenerative changes of the spine with multilevel mild endplate compression deformities of the lumbar spine unchanged.    Impression:       Poor contrast opacification of the vasculature limits assessment.     Severe atherosclerotic disease of the vasculature.  Multifocal high-grade narrowing or occlusions of the calf vessels.  Vascular narrowing/occlusions may be overestimated due to poor opacification.     Electronically signed by resident: Jaylan Griffin   Date: 06/16/2022   Time:  15:08     Electronically signed by: Eric Carlos MD   Date: 06/16/2022   Time: 16:18   VAS Toe Brachial Indices Resting [[id number]] Collected: 06/16/22 0948   Order Status: Completed Updated: 06/16/22 1056   Narrative:       Indication   ========     Peripheral Arterial Disease     Pressure Lower   ============     Rt brachial A syst BP  178 mmHg   Rt PTA BP  255 mmHg   Rt dors pedis A  mmHg   Rt toe BP  130 mmHg   Right MARIO ratio use:   post. tibial   Rt MARIO post tibial (rt post tib A BP / max brach A BP) 1.43   Rt MARIO (rt dors ped A BP / max brach A BP) 1.43   Rt ankle BP / max brach A BP   1.43   Rt TBI (rt toe BP / max brach A BP)    0.73   Lt PTA BP  255 mmHg   Left MARIO ratio use:    post. tibial   Lt MARIO (lt post tibial A BP / max brach A BP)  1.43   Lt ankle BP / max brach A BP   1.43     PPG Lower   =========     Rt toe BP - PPG    130 mmHg   Rt toe digit waveform: severely dampened     Impression   =========     Right Leg: Segmental pressures are unreliable due to medial calcinosis; however, PVR waveforms suggest mild peripheral arterial   occlusive disease.   Left Leg: Segmental pressures are unreliable due to medial calcinosis; however, PVR waveforms suggest mild peripheral arterial   occlusive disease. Absent DPA signal.     -Bilateral Toe PPG's and Pressures were performed.   Rt lower digits: Toe PPG waveforms as described above. - TBI of 0.73 with a Great toe pressure of 130 mmHg mmHg is noted.   Lt lower digits: History of foot amputation.       DATE OF SERVICE: 06/16/2022                                                        Sonographer: Leatha Devi RDMS, RVT   Electronically Signed by: ADAM Jauregui M.D. at 06/16/2022-10:53   US Lower Extremity Arteries Left [[id number]] (Abnormal) Resulted: 06/15/22 1808   Order Status: Completed Updated: 06/15/22 1811   Narrative:     EXAMINATION:   US LOWER EXTREMITY ARTERIES LEFT     CLINICAL HISTORY:   Pain in leg, unspecified     TECHNIQUE:   Left  lower extremity arterial duplex ultrasound examination performed. Multiple gray scale and color doppler images were obtained in addition to waveform analysis.     COMPARISON:   Ultrasound lower extremity arteries bilateral 12/09/2020     FINDINGS:   The peak systolic velocities on the left are as follows, in centimeters/second:     Common femoral artery: 174, triphasic     Superficial femoral artery, proximal: 130, triphasic     Superficial femoral artery, mid portion: 177, triphasic     Superficial femoral artery, distal: 144, triphasic     Popliteal artery: 140 proximally, monophasic and 89 distally, monophasic     Posterior tibial artery: 86, monophasic     Anterior tibial artery: 134, monophasic    Impression:       Monophasic waveform within the left distal lower extremity, consistent with atherosclerotic disease.  Areas of high-grade stenosis within the popliteal artery.     The left peroneal artery is not visualized, and may be occluded.     This report was flagged in Epic as abnormal.     Electronically signed by resident: Lissett Kearney   Date: 06/15/2022   Time: 17:59     Electronically signed by: Juan Hernandez MD   Date: 06/15/2022   Time: 18:08       Imaging History    2022  Date Procedure Name Study Review link PACS Link Status Accession Number Location   06/16/22 03:07 PM CTA Runoff ABD PEL Bilat Lower Ext  Study Review  Images Final [id number] [facility]   06/16/22 08:39 AM VAS Toe Brachial Indices Resting  Study Review  Images Final [id number]    06/15/22 05:28 PM US Lower Extremity Arteries Left  Study Review  Images Final [id number] [facility]   06/15/22 03:36 PM X-Ray Tibia Fibula 2 View Left  Study Review  Images Final [id number] [facility]

## 2022-06-22 NOTE — PLAN OF CARE
06/22/22 1251   Post-Acute Status   Post-Acute Authorization Placement   Post-Acute Placement Status Pending post-acute provider review/more information requested   [facility]- would be willing to accept if patient is able to obtain a letter of closure from a medicare claim on 5/25/16    [facility]- will place patient on a waiting list for a private room since patient is not fully vaccinated for covid (2 vaccine doses +booster shot is considered fully vaccinated per admissions).    [facility]- spoke with Yulissa in admissions who reports their D.O.N. will review the referral. Yulissa will notify SW of their decision    142/PASRR uploaded into careport.    Toshia Dennis, LORNA  [facility]  Ext. [phone]

## 2022-06-22 NOTE — PLAN OF CARE
[facility]    Will sign-off as discharge disposition has changed. Please refer again should outpatient infusion needs arise.     [facility]  Alma Jimenez Rn, Clinical Educator  Cell [phone]  Office [phone]  Fax [phone]'

## 2022-06-22 NOTE — ASSESSMENT & PLAN NOTE
72 year old male with history of DM2 with peripheral neuropathy, left midtarsal amputation 2010, left calcaneal osteomyelitis s/p treatment in the past who was admitted for infected left heel ulceration. Tib/fib xray of the left leg notable for osteomyelitis of the posterior calcaneus. Vascular studies concerning for left lower leg arterial stenosis and calf vessel narrowing/occulsions.    Wound culture is positive for GBS, MRSA, prevotella and porphromonas.   Bone culture is positive for Staph species. Pathology pending.     Patient is currently on ceftriaxone, vancomycin and flagyl. Fevers resolved. No leukocytosis. Vascular surgery recommends AKA vs BKA. Patient continues to weigh his decisions though appears amenable to BKA.  Mild YNES    Recommendations:  - DC Vancomycin given developing YNES.  - DC ceftriaxone as no GNR on cx and start Daptomycin as covers for MRSA and GBS on wound cx and Staph species on bone cx.  - Continue Flagyl for now given anaerobes on wound culture though none on bone cultures  - Follow up bone culture Staph Species and pathology results  - If patient undergoes a more proximal amputation, long term antibiotics will not be indicated - appear to be possibly amenable to BKA.   - Discussed that efficacy of IV abx in the setting of vascular dz of RLE and likely would have minimal benefit and ultimately may need amputation at some point.   - if refuses any sx, will need line for abx.  - Discussed with ID staff   - Will follow

## 2022-06-22 NOTE — PROGRESS NOTES
Aaron Berg - Telemetry [facility]  Telemedicine Progress Note    Patient Name: Saji Castañeda  MRN: [id number]  Patient Class: IP- Inpatient   Admission Date: 6/15/2022  Length of Stay: 6 days  Attending Physician: Salma Strauss MD  Primary Care Provider: Mart Escalante MD      Subjective:     Principal Problem:Osteomyelitis of left lower extremity    HPI:  71 y.o. M with PMHx DM2 and PVD with chronic diabetic foot wounds s/p L sided BKA, HTN, asymptomatic bradycardia, CKD 3 and HLD who presented to the ED for worsening R foot pain and drainage. Pt. With known chronic ulceration/osteomylitis to L heel and ID has recommended BKA for definitive therapy, but the patient reports that he has not been interested in amputation. Today, he had his usual f/u appointment with ID, and the patient was referred to the ED for imaging with bone biopsy and potential abx because of worsening drainage and pain. Pt. Denies any current fevers, chills, nausea, or other systemic signs of infection.      Overview/Hospital Course:  Patient admitted to hospital medicine. Podiatry and vascular surgery consulted. Patient underwent bone biopsy and culture. Infectious disease consulted.       Telemedicine  This service was provided by Virtual Visit.    Patient was transferred to [facility] on:  6/18/2022    Chief Complaint   Patient presents with    Wound Check     Sent from podiatry and stated in pain. L toes amputated     The patient location is: 8092/8092 A   Admitted 6/15/2022  2:10 PM  Present with the patient at the time of the telemed/virtual assessment: Telepresenter    Interval History / Events Overnight:   The patient is able to provide adequate history. Additional history was obtained from past medical records. No significant events reported by Nursing.   Patient complains of diarrhea. Symptoms have worsened since yesterday. Associated symptoms include: fatigue. Symptoms are increasing in frequency.      Lab test(s) reviewed: hyperglycemia, vancomycin level elevated.    Review of Systems   Constitutional:  Negative for fever.   Respiratory:  Negative for shortness of breath.      Objective:     Vital Signs (Most Recent):  Temp: 98.6 °F (37 °C) (06/21/22 0712)  Pulse: (!) 58 (06/21/22 0755)  Resp: 16 (06/21/22 0712)  BP: (!) 163/70 (06/21/22 0712)  SpO2: (!) 94 % (06/21/22 0712)   Vital Signs (24h Range):  Temp:  [98.1 °F (36.7 °C)-98.6 °F (37 °C)] 98.6 °F (37 °C)  Pulse:  [] 58  Resp:  [16-20] 16  SpO2:  [91 %-95 %] 94 %  BP: (128-187)/(62-87) 163/70     Weight: 124.3 kg (274 lb 0.5 oz)  Body mass index is 40.47 kg/m².    Intake/Output Summary (Last 24 hours) at 6/21/2022 1121  Last data filed at 6/20/2022 1829  Gross per 24 hour   Intake 400 ml   Output 300 ml   Net 100 ml        Physical Exam  Constitutional:       General: He is not in acute distress.     Appearance: Normal appearance.   Eyes:      General: Lids are normal. No scleral icterus.        Right eye: No discharge.         Left eye: No discharge.      Conjunctiva/sclera: Conjunctivae normal.   Neck:      Trachea: Phonation normal.   Cardiovascular:      Rate and Rhythm: Bradycardia present.      Comments: Monitor / Vital signs reviewed at time of visit  Pulmonary:      Effort: Pulmonary effort is normal. No tachypnea, accessory muscle usage or respiratory distress.   Abdominal:      General: There is no distension.   Musculoskeletal:      Left foot: Deformity (s/p partial amputation) present.   Skin:     Coloration: Skin is not cyanotic.   Neurological:      Mental Status: He is alert. He is not disoriented.   Psychiatric:         Attention and Perception: Attention normal.         Behavior: Behavior is cooperative.       Significant Labs:   Recent Labs   Lab 06/16/22  1100   HGBA1C 9.5*       Recent Labs   Lab 06/20/22  1139 06/20/22  1526 06/20/22  1731   POCTGLUCOSE 293* 235* 210*       Recent Labs   Lab 06/16/22  1100 06/18/22  2155  06/20/22  0425   WBC 4.56 4.86 6.22   HGB 10.8* 10.3* 10.4*   HCT 36.0* 32.8* 33.4*    315 342       Recent Labs   Lab 06/16/22  1100 06/18/22  0328 06/20/22  0425   GRAN 59.0  2.7 58.9  2.9 63.1  3.9   LYMPH 24.6  1.1 18.1  0.9* 16.6*  1.0   MONO 10.5  0.5 15.4*  0.8 11.7  0.7   EOS 0.2 0.3 0.5       Recent Labs   Lab 06/15/22  1633 06/16/22  1100 06/18/22  0328 06/20/22  0425    138 134* 135*   K 3.9 3.7 4.3 4.3    104 98 98   CO2 25 24 25 28   BUN 10 9 13 20   CREATININE 1.1 0.9 1.2 1.4   GLU 68* 81 246* 281*   CALCIUM 9.2 9.1 9.2 9.3   ALBUMIN 2.8* 2.6* 2.4* 2.4*   MG 1.7  --   --   --    PHOS 3.2  --   --   --        Recent Labs   Lab 06/15/22  1633 06/16/22  1100 06/18/22 0328 06/20/22  0425   ALKPHOS 87 84 73 69   ALT 11 7* 8* 9*   AST 12 11 12 14   PROT 8.0 7.5 7.1 7.0   BILITOT 0.4 0.5 0.3 0.3   CRP 26.7*  --   --   --        Recent Labs   Lab 02/23/22  1407 06/15/22  1633   LACTATE  --  1.4   SEDRATE >120* >120*       SARS-CoV2 (COVID-19) Qualitative PCR (no units)   Date Value   03/29/2021 Not Detected   03/15/2021 Not Detected   03/02/2021 Not Detected   02/22/2021 Not Detected   01/29/2021 Not Detected     SARS-CoV-2 RNA, Amplification, Qual (no units)   Date Value   02/03/2021 Negative     POC Rapid COVID (no units)   Date Value   06/15/2022 Negative   02/18/2021 Negative   12/08/2020 Negative     CTA Runoff ABD PEL Bilat Lower Ext  Narrative: EXAMINATION:  CTA RUNOFF ABD PEL BILAT LOWER EXT    CLINICAL HISTORY:  Claudication or leg ischemia;Peripheral arterial disease (PAD), asymptomatic;    TECHNIQUE:  CT angiogram of the abdomen/pelvis with lower extremity runoff using xyz mL of  Omnipaque 350 IV contrast.  Multiplanar reconstructions performed.    COMPARISON:  CT abdomen pelvis 02/03/2012    FINDINGS:  Upper extremities within the gantry resulting in beam hardening artifact limiting assessment.    Abdominopelvic Vasculature:    Visualized aorta demonstrates mild  calcific atherosclerosis with no convincing evidence of dissection, aneurysm, high-grade stenosis.    Renal, superior mesenteric, and celiac arteries are patent.  GEORGIE is not well assessed due to artifact and poor opacification however appears patent with possible mild to moderate narrowing at the origin.    Common and external iliac arteries demonstrate no hemodynamically significant stenosis.  Bilateral internal iliac arteries demonstrate calcific and noncalcific atherosclerosis likely resulting in areas of moderate narrowing not well assessed due to artifact and poor opacification.    Right lower extremity:    Right common femoral and proximal superficial femoral arteries demonstrate no hemodynamically significant stenosis.  Right DFA with probable multifocal high-grade narrowing/occlusions versus poor opacification.  Moderate atherosclerotic disease of the mid to distal SFA with suspected areas of moderate to high-grade narrowing difficult to assess due to poor opacification.    Right popliteal artery with suspected multifocal moderate to high-grade narrowing limited assessment due to poor opacification.    Moderate narrowing at the trifurcation.    Suspected moderate narrowing at the peroneal origin with severe atherosclerotic disease and probable occlusions distally.    Severe narrowing at the origin of the posterior tibial artery with multifocal severe atherosclerotic disease distally limiting assessment.    Suspected high-grade narrowing at the origin of the anterior tibial artery, otherwise patent.    Left lower extremity:    Left common femoral and superficial femoral arteries are likely patent.  Left DFA with suspected multifocal high-grade narrowing/occlusions versus poor opacification.    Suspected multifocal occlusions of the distal left popliteal artery.  Trifurcation with high-grade narrowing.    Severe atherosclerotic disease of the posterior tibial artery and suspected occlusion of the  origin.    Multifocal occlusions or high-grade narrowing of the peroneal artery.    Anterior tibial artery with areas of patency otherwise there is severe atherosclerotic disease with multifocal high-grade narrowing and possible short-segment occlusions.    Bilateral lower extremities demonstrate diffuse subcutaneous edema and left-sided asymmetric muscular atrophy compared to the right.  Advanced degenerative changes of the knees.    Inferior Mediastinum/Heart: Normal.    Lung Bases: No nodules or consolidation.    Peritoneal Space: No ascites. No free air.    Liver: No focal mass.    Gallbladder: Tiny suspected gas foci within the gallbladder neck lumen which may suggest stones containing gas.    Bile Ducts: No evidence of dilated ducts.    Pancreas: No mass or peripancreatic fat stranding.    Spleen: Normal.    Adrenals: Unremarkable.    Bowel/Mesentery: No evidence of bowel obstruction or inflammation.  Diverticulosis coli.    Urinary Tract: No evidence of obstructive uropathy. Kidneys enhance symmetrically.  Left renal 1.8 cm cyst.    Retroperitoneum:  No significant adenopathy.    Pelvis:  Prostatomegaly.    Thoracic/Abdominal wall:  Gynecomastia bilaterally.    Bones: Moderate degenerative changes of the spine with multilevel mild endplate compression deformities of the lumbar spine unchanged.  Impression: Poor contrast opacification of the vasculature limits assessment.    Severe atherosclerotic disease of the vasculature.  Multifocal high-grade narrowing or occlusions of the calf vessels.  Vascular narrowing/occlusions may be overestimated due to poor opacification.    Electronically signed by resident: Jaylan Griffin  Date:    06/16/2022  Time:    15:08    Electronically signed by: Eric Carlos MD  Date:    06/16/2022  Time:    16:18  VAS Toe Brachial Indices Resting  Indication  ========    Peripheral Arterial Disease    Pressure Lower  ============    Rt brachial A syst BP  178 mmHg  Rt PTA BP  255  mmHg  Rt dors pedis A  mmHg  Rt toe BP  130 mmHg  Right MARIO ratio use:   post. tibial  Rt MARIO post tibial (rt post tib A BP / max brach A BP) 1.43  Rt MARIO (rt dors ped A BP / max brach A BP) 1.43  Rt ankle BP / max brach A BP   1.43  Rt TBI (rt toe BP / max brach A BP)    0.73  Lt PTA BP  255 mmHg  Left MARIO ratio use:    post. tibial  Lt MARIO (lt post tibial A BP / max brach A BP)  1.43  Lt ankle BP / max brach A BP   1.43    PPG Lower  =========    Rt toe BP - PPG    130 mmHg  Rt toe digit waveform: severely dampened    Impression  =========    Right Leg: Segmental pressures are unreliable due to medial calcinosis; however, PVR waveforms suggest mild peripheral arterial  occlusive disease.  Left Leg: Segmental pressures are unreliable due to medial calcinosis; however, PVR waveforms suggest mild peripheral arterial  occlusive disease. Absent DPA signal.    -Bilateral Toe PPG's and Pressures were performed.  Rt lower digits: Toe PPG waveforms as described above. - TBI of 0.73 with a Great toe pressure of 130 mmHg mmHg is noted.  Lt lower digits: History of foot amputation.    DATE OF SERVICE: 06/16/2022                                                      Sonographer: Leatha Devi RDMS, RVT  Electronically Signed by: ADAM Jauregui M.D. at 06/16/2022-10:53      Labs and Imaging within the last 24 hours listed above were reviewed.       Diet: Diet diabetic [facility]; 2000 Calorie; Cardiac (Low Na/Chol)  Significant LDAs:   IV Access Type: Peripheral  Urinary Catheter Indication if present: Patient Does Not Have Urinary Catheter  Other Lines/Tubes/Drains:    HIGH RISK CONDITION(S):   Patient has a condition that poses threat to life and bodily function: ischemic limb likely in need of amputation     Goals of Care:    Previous admission:  2/17/21  Likely prognosis:  Fair  Code Status: Full Code  Comfort Only: No  Hospice: No  Goals at discharge: remain at home, with physician follow-up    Discharge  Planning:  OXANA: 6/22/2022     Code Status: Full Code   Is the patient medically ready for discharge?: No    Reason for patient still in hospital (select all that apply): Patient trending condition, Treatment, Consult recommendations, and Pending disposition  Discharge Plan A: Home Health, Home, Other (IV ABX pending)       Assessment/Plan:      * Osteomyelitis of left lower extremity  -With acute on chronic osteomyleitis of LLE including heel with purulent foul smelling drainage  -Podiatry consulted, Not septic appearing. S/p bone biopsy.  -ID consulted. Cultures growing Staph aureus and GBS   - L foot wound anaerobic culture growing Prevotella and Porphyromonas Somerae. PO Flagyl added.    - L foot bone culture growing Staph species, continuing IV ceftriaxone and vancomycin (per Pharmacy dosing).    Acute renal insufficiency  Per med history, patient no longer taking ARB.  sCr up slightly and vanco level > 20.  Discontinued ARB, holding diuretic for now. Trial of gentle hydration.    Peripheral arterial disease  -PVD contributing to current non-healing foot wound. Arterial US shows LLE atherosclerotic disease, areas of high-grade stenosis within the L popliteal artery  -Continue home statin, ASA  -Vascular Surgery consulted - patient considering surgery (BKA); seems reluctant primarily due to the time he expects it to take to obtain a prosthetic limb. Wants to try conservative management with antibiotics.    Asymptomatic Mobitz (type) I (Wenckebach's) atrioventricular block  -Bradycardic in ED but asymptomatic, chronic issue  -Repeat EKG 6/19:  BPM 44 Normal sinus rhythm - AV block, 2:1 - Nonspecific T wave abnormality     Chronic kidney disease, stage III (moderate)  -sCr baseline 1.1  -Continue to monitor    Type 2 diabetes, uncontrolled, with neuropathy  -Last A1c 2020, repeat > 9%. Per patient, taking 68 units Lantus QHS along with metformin  mg and glipizide 10 mg daily (Possibly, he is not  sure)  -Mildly hypoglycemic on admit with BG 63  -decreased dose of basal insulin to 15 units Levemir, Titrate up as needed. Added prandial insulin 6/19  -worsening hyperglycemia as oral agents wash out. Levemir increased to BID and aspart increased with meals.  - doses adjusted 6/21    Essential hypertension  Continue losartan 100mg and HCTZ 25 mg, with hydralazine 25 mg PO q8h PRN  Added nifedipine 6/18  Consulted PharmD for med history.  Discontinue ARB, hold HCTZ due to increasing aCr. Continue nifedipine for BP control.        Active Hospital Problems    Diagnosis  POA    *Osteomyelitis of left lower extremity [M86.9]  Yes    Acute renal insufficiency [N28.9]  No    Foot pain, left [M79.672]  Yes    Peripheral arterial disease [I73.9]  Yes     Chronic    Asymptomatic Mobitz (type) I (Wenckebach's) atrioventricular block [I44.1]  Yes    Chronic kidney disease, stage III (moderate) [N18.30]  Yes    Type 2 diabetes, uncontrolled, with neuropathy [E11.40, E11.65]  Yes     Chronic    Essential hypertension [I10]  Yes     Chronic      Resolved Hospital Problems   No resolved problems to display.       Inpatient Medications Prescribed for Management of Current Problems:     Scheduled Meds:    aspirin  81 mg Oral Daily    atorvastatin  80 mg Oral Daily    cefTRIAXone (ROCEPHIN) IVPB  2 g Intravenous Q24H    gabapentin  200 mg Oral BID    insulin aspart U-100  11 Units Subcutaneous TIDWM    insulin detemir U-100  16 Units Subcutaneous BID    Lactobacillus rhamnosus GG  1 capsule Oral BID    metroNIDAZOLE  500 mg Oral Q8H    [START ON 6/22/2022] NIFEdipine  90 mg Oral Daily    tamsulosin  0.4 mg Oral Daily     Continuous Infusions:   As Needed: acetaminophen, albuterol-ipratropium, aluminum-magnesium hydroxide-simethicone, dextrose 10%, dextrose 10%, glucagon (human recombinant), glucose, glucose, hydrALAZINE, insulin aspart U-100, melatonin, naloxone, ondansetron, oxyCODONE-acetaminophen,  prochlorperazine, sars-cov-2 (covid-19), sodium chloride 0.9%, vancomycin - pharmacy to dose    VTE Risk Mitigation (From admission, onward)         Ordered     IP VTE HIGH RISK PATIENT  Once         06/15/22 1957     Place sequential compression device  Until discontinued         06/15/22 1957              I have assessed these finding virtually using telemed platform and with assistance of bedside nurse       The attending portion of this evaluation, treatment, and documentation was performed per Salma Strauss MD via Telemedicine AudioVisual using the secure [employer] Interactive software platform with 2 way audio/video. The provider was located off-site and the patient is located in the hospital. The aforementioned video software was utilized to document the relevant history and physical exam    Salma Strauss MD  Department of Hospital Medicine   [facility] - Telemetry Stepdown

## 2022-06-22 NOTE — PLAN OF CARE
Problem: Adult Inpatient Plan of Care  Goal: Plan of Care Review  Outcome: Ongoing, Progressing  Goal: Patient-Specific Goal (Individualized)  Outcome: Ongoing, Progressing  Goal: Absence of Hospital-Acquired Illness or Injury  Outcome: Ongoing, Progressing  Goal: Optimal Comfort and Wellbeing  Outcome: Ongoing, Progressing  Goal: Readiness for Transition of Care  Outcome: Ongoing, Progressing     Alert and oriented x4. Vitals stable. Free from falls and injuries during shift. No concerns or requests voiced. Bed low with side rails up x2. Call bell within reach. Will continue plan of care.      Calm/Appropriate

## 2022-06-22 NOTE — PT/OT/SLP PROGRESS
Physical Therapy Treatment    Patient Name:  Saji Castañeda   MRN:  [id number]    Recommendations:     Discharge Recommendations:  nursing facility, skilled   Discharge Equipment Recommendations: wheelchair, bedside commode, walker, rolling   Barriers to discharge: Decreased caregiver support and increased assist required    Assessment:     Saji Castañeda is a 72 y.o. male admitted with a medical diagnosis of Osteomyelitis of left lower extremity.  He presents with the following impairments/functional limitations:  weakness, impaired endurance, impaired self care skills, impaired functional mobilty, gait instability, impaired balance, decreased lower extremity function, decreased upper extremity function, impaired skin, decreased safety awareness. Saji Castañeda would benefit from acute PT intervention to address listed functional deficits, provide patient/caregiver education, reduce fall risk, and maximize (I) and safety with functional mobility.    Pt presents with significant functional mobility deficits and is functioning below baseline. Pt continues to participate well in PT sessions, demonstrating good motivation and engagement throughout. Pt limited by weight bearing status, but is making progress towards goals. Session focused on pre-gait training and therex. Pt will continue to benefit from acute PT services in order to maximize safety and (I) with functional mobility.    After hospital discharge, pt would benefit from SNF to continue addressing therapy impairments.    Rehab Prognosis: Good; patient would benefit from acute skilled PT services to address these deficits and reach maximum level of function.      Plan:     During this hospitalization, patient to be seen 3 x/week to address the identified rehab impairments via gait training, therapeutic activities, therapeutic exercises, neuromuscular re-education and progress toward the following goals:    · Plan of Care Expires:  07/20/22    This plan of care  has been discussed with the patient, who was included in its development and is in agreement with the identified goals and treatment plan.     Subjective     Communicated with RN prior to session.  Patient agreeable to participate.     Chief Complaint: none noted  Patient/Family Comments/goals: to get better  Pt pain prior to session: 0/10  Pt pain following session: 0/10    Objective:     Patient found HOB elevated with Other (comments) (no active lines)  upon PT entry to room.    General Precautions: Standard, fall, contact   Orthopedic Precautions:LLE partial weight bearing (through heel for transfers only)   Braces:  (has darco shoe he has been wearing on RLE per patient)     Functional Mobility:    Bed Mobility:  · Supine to Sit: Stand-by Assistance  · Sit to Supine: Stand-by Assistance  · Scooting anteriorly to EOB to plant feet on floor: Stand-by Assistance    Transfers:   · Sit to Stand Transfer: Contact Guard Assistance  from EOB with RW   · Performed x 3 trials  · Cues for hand placement on bed instead of RW             Gait:  · Patient received PRE-gait training with Contact Guard Assistance and rolling walker  · Pt performed static marches with LLE only (RLE weight bearing) x 30 reps  · Practiced advancing RW and safely pulling it back x 5 reps  · Cues to decrease WBing through UEs throuhgout    Balance:  · Static Sit: Supervision at EOB x ~10 minutes  · Static Stand: Contact-Guard Assist with Rolling walker      Therapeutic Activities/Exercises     Pt performed bed level therex:  SAQs 2 x 10  Glute sets 2 x 5  *instructed to perform x 3-5x/day    Patient educated on the importance of early mobility to prevent functional decline during hospital stay    Patient was instructed to utilize staff assistance for mobility/transfers.  Patient was educated on PT POC and all questions answered within PT scope of practice.    Patient able to verbalize understanding; will follow-up with pt during current admit for  additional questions/concerns within scope of practice.     AM-PAC 6 CLICK MOBILITY  Total Score:15     Patient left HOB elevated with all lines intact and call button in reach.        History/Goals:     PAST MEDICAL HISTORY:  Past Medical History:   Diagnosis Date    Arthritis     legs    Diabetes mellitus     Diabetes mellitus, type 2     Hyperlipidemia     Osteomyelitis        Past Surgical History:   Procedure Laterality Date    ANGIOGRAPHY OF LOWER EXTREMITY N/A 2/3/2021    Procedure: Angiogram Extremity Bilateral;  Surgeon: Ernst Chacko MD;  Location: [facility] OR [address] [place] [address];  Service: Peripheral Vascular;  Laterality: N/A;  7.4 mintues fluoroscopy time  816.15 mGy  170.17 Gycm2    AORTOGRAPHY WITH EXTREMITY RUNOFF Bilateral 2/3/2021    Procedure: AORTOGRAM, WITH EXTREMITY RUNOFF;  Surgeon: Ernst Chacko MD;  Location: [facility] OR [address] [place] [address];  Service: Peripheral Vascular;  Laterality: Bilateral;    DEBRIDEMENT OF FOOT Left 2021    Procedure: DEBRIDEMENT, LEFT HEEL;  Surgeon: Mayra Schroeder DPM;  Location: [facility] OR [address] [place] [address];  Service: Podiatry;  Laterality: Left;    FOOT AMPUTATION  2010    left high midfoot amputation       GOALS:   Multidisciplinary Problems     Physical Therapy Goals        Problem: Physical Therapy    Goal Priority Disciplines Outcome Goal Variances Interventions   Physical Therapy Goal     PT, PT/OT Ongoing, Progressing     Description: Goals to be met by: 22     Patient will increase functional independence with mobility by performin. Supine to sit with Modified [place]  2. Sit to stand transfer with Supervision  3. Bed to chair transfer with Stand-by Assistance using LRAD  4. Gait  x 10 feet with Stand-by Assistance using LRAD                     Time Tracking:     PT Received On: 22  PT Start Time: 1416     PT Stop Time: 1447  PT Total Time (min): 31 min     Billable Minutes: Therapeutic Activity 16 and Therapeutic Exercise  15      Sonny Madrid, PT  06/22/2022

## 2022-06-22 NOTE — ASSESSMENT & PLAN NOTE
Per med history, patient no longer taking ARB.  sCr up slightly and vanco level > 20.  Discontinued ARB, holding diuretic for now. Trial of gentle hydration.

## 2022-06-22 NOTE — PLAN OF CARE
Problem: Adult Inpatient Plan of Care  Goal: Plan of Care Review  Outcome: Ongoing, Progressing  Goal: Patient-Specific Goal (Individualized)  Outcome: Ongoing, Progressing  Goal: Absence of Hospital-Acquired Illness or Injury  Outcome: Ongoing, Progressing  Goal: Optimal Comfort and Wellbeing  Outcome: Ongoing, Progressing  Goal: Readiness for Transition of Care  Outcome: Ongoing, Progressing     Problem: Bariatric Environmental Safety  Goal: Safety Maintained with Care  Outcome: Ongoing, Progressing     Problem: Diabetes Comorbidity  Goal: Blood Glucose Level Within Targeted Range  Outcome: Ongoing, Progressing     Problem: Oral Intake Inadequate (Acute Kidney Injury/Impairment)  Goal: Optimal Nutrition Intake  Outcome: Ongoing, Progressing     Problem: Impaired Wound Healing  Goal: Optimal Wound Healing  Outcome: Ongoing, Progressing     Problem: Skin Injury Risk Increased  Goal: Skin Health and Integrity  Outcome: Ongoing, Progressing     Problem: Infection  Goal: Absence of Infection Signs and Symptoms  Outcome: Ongoing, Progressing   Pt was stable and calm throughout shift. Pt had some elevated blood sugars today all were covered by scheduled and PRN insulin. Pt was educated and has no questions at this time.

## 2022-06-22 NOTE — CONSULTS
Pharmacokinetic Assessment Follow Up: IV Vancomycin    Vancomycin serum concentration assessment/plan:   Random level resulted as 16.6 mcg/mL; Goal 15-20 mcg/mL, Bone/joint Infection    Patient meets criteria for YNES and Serum creatinine 1.7 mg/dL   Re-dose Vancomycin 1000 mg IV x 1; continue to dose by level when the random is less than 20 mcg/mL   Next level to be drawn on 6/23 at 0430    Drug levels (last 3 results):  Recent Labs   Lab Result Units 06/20/22  0425 06/21/22  0944 06/22/22  0416   Vancomycin, Random ug/mL  --  23.8 16.6   Vancomycin-Trough ug/mL 29.7*  --   --        Pharmacy will continue to follow and monitor vancomycin.    Please contact pharmacy at extension [phone] for questions regarding this assessment.    Thank you for the consult,   Sarahy Abebe     Patient brief summary:  Saji Castañeda is a 72 y.o. male initiated on antimicrobial therapy with IV Vancomycin for treatment of bone/joint infection    Drug Allergies:   Review of patient's allergies indicates:  No Known Allergies    Actual Body Weight:   124.3 kg    Renal Function:   Estimated Creatinine Clearance: 50.4 mL/min (A) (based on SCr of 1.7 mg/dL (H)).,     Dialysis Method (if applicable):  N/A    CBC (last 72 hours):  Recent Labs   Lab Result Units 06/20/22 0425 06/22/22  0416   WBC K/uL 6.22 6.33   Hemoglobin g/dL 10.4* 10.2*   Hematocrit % 33.4* 33.0*   Platelets K/uL 342 320   Gran % % 63.1 61.6   Lymph % % 16.6* 19.9   Mono % % 11.7 9.6   Eosinophil % % 7.9 8.1*   Basophil % % 0.5 0.5   Differential Method  Automated Automated       Metabolic Panel (last 72 hours):  Recent Labs   Lab Result Units 06/20/22  0425 06/22/22  0416   Sodium mmol/L 135* 137   Potassium mmol/L 4.3 4.7   Chloride mmol/L 98 100   CO2 mmol/L 28 30*   Glucose mg/dL 281* 256*   BUN mg/dL 20 26*   Creatinine mg/dL 1.4 1.7*   Albumin g/dL 2.4* 2.6*   Total Bilirubin mg/dL 0.3 0.2   Alkaline Phosphatase U/L 69 76   AST U/L 14 15   ALT U/L 9* 10        Vancomycin Administrations:  vancomycin given in the last 96 hours                   vancomycin 1.5 g in dextrose 5 % 250 mL IVPB (ready to mix) (mg) 1,500 mg New Bag 06/20/22 0642     1,500 mg New Bag 06/19/22 1944     1,500 mg New Bag  0629     1,500 mg New Bag 06/18/22 1827    vancomycin 1.75 g in 5 % dextrose 500 mL IVPB (mg) 1,750 mg New Bag 06/18/22 0630     1,750 mg New Bag 06/17/22 1734     1,750 mg New Bag  0619    vancomycin 2 g in dextrose 5 % 500 mL IVPB (mg) 2,000 mg New Bag 06/16/22 1825                Microbiologic Results:  Microbiology Results (last 7 days)     Procedure Component Value Units Date/Time    Aerobic culture [[id number]]  (Abnormal) Collected: 06/16/22 1308    Order Status: Completed Specimen: Bone from Foot, Left Updated: 06/21/22 0737     Aerobic Bacterial Culture STAPHYLOCOCCUS SPECIES  Rare  Identification and susceptibility pending      Blood culture x two cultures. Draw prior to antibiotics. [[id number]] Collected: 06/15/22 1633    Order Status: Completed Specimen: Blood from Peripheral, Antecubital, Right Updated: 06/20/22 1812     Blood Culture, Routine No growth after 5 days.    Narrative:      Aerobic and anaerobic    Blood culture x two cultures. Draw prior to antibiotics. [[id number]] Collected: 06/15/22 1634    Order Status: Completed Specimen: Blood from Peripheral, Antecubital, Left Updated: 06/20/22 1812     Blood Culture, Routine No growth after 5 days.    Narrative:      Aerobic and anaerobic    Culture, Anaerobe [[id number]]  (Abnormal) Collected: 06/16/22 1305    Order Status: Completed Specimen: Wound from Foot, Left Updated: 06/20/22 1209     Anaerobic Culture PRESUMPTIVE PREVOTELLA/PORPHYROMONAS GROUP  Rare        PORPHYROMONAS SOMERAE  Rare      Culture, Anaerobe [[id number]] Collected: 06/16/22 1308    Order Status: Completed Specimen: Bone from Foot, Left Updated: 06/20/22 0736     Anaerobic Culture No anaerobes isolated    Aerobic culture [[id number]]   (Abnormal)  (Susceptibility) Collected: 06/16/22 1305    Order Status: Completed Specimen: Wound from Foot, Left Updated: 06/18/22 1124     Aerobic Bacterial Culture STREPTOCOCCUS AGALACTIAE (GROUP B)  Moderate  Beta-hemolytic streptococci are routinely susceptible to   penicillins,cephalosporins and carbapenems.  Susceptibility testing not routinely performed        METHICILLIN RESISTANT STAPHYLOCOCCUS AUREUS  Moderate      Urine culture [[id number]] Collected: 06/15/22 1817    Order Status: Completed Specimen: Urine Updated: 06/16/22 2030     Urine Culture, Routine No growth    Narrative:      Specimen Source->Urine    Gram stain [[id number]] Collected: 06/16/22 1308    Order Status: Completed Specimen: Bone from Foot, Left Updated: 06/16/22 1813     Gram Stain Result No WBC's      No organisms seen    Gram stain [[id number]] Collected: 06/16/22 1305    Order Status: Completed Specimen: Wound from Foot, Left Updated: 06/16/22 1803     Gram Stain Result Rare WBC's      Rare Gram positive cocci

## 2022-06-22 NOTE — SUBJECTIVE & OBJECTIVE
Telemedicine  This service was provided by Virtual Visit.    Patient was transferred to [facility] on:  6/18/2022    Chief Complaint   Patient presents with    Wound Check     Sent from podiatry and stated in pain. L toes amputated     The patient location is: [id number] A   Admitted 6/15/2022  2:10 PM  Present with the patient at the time of the telemed/virtual assessment: Telepresenter    Interval History / Events Overnight:   The patient is able to provide adequate history. Additional history was obtained from past medical records. No significant events reported by Nursing.   Patient complains of diarrhea. Symptoms have resolved since yesterday. Associated symptoms include: fatigue. Symptoms are stable.     Lab test(s) reviewed: hyperglycemia, vancomycin level improved but sCr has increased.    Review of Systems   Constitutional:  Negative for fever.   Respiratory:  Negative for shortness of breath.      Objective:     Vital Signs (Most Recent):  Temp: 98.5 °F (36.9 °C) (06/22/22 0736)  Pulse: (!) 46 (06/22/22 0853)  Resp: 18 (06/22/22 0736)  BP: (!) 163/74 (06/22/22 0736)  SpO2: (!) 92 % (06/22/22 0736)   Vital Signs (24h Range):  Temp:  [96.6 °F (35.9 °C)-98.5 °F (36.9 °C)] 98.5 °F (36.9 °C)  Pulse:  [46-78] 46  Resp:  [15-24] 18  SpO2:  [92 %-97 %] 92 %  BP: (161-176)/(69-77) 163/74     Weight: 124.3 kg (274 lb 0.5 oz)  Body mass index is 40.47 kg/m².    Intake/Output Summary (Last 24 hours) at 6/22/2022 1112  Last data filed at 6/22/2022 0432  Gross per 24 hour   Intake --   Output 300 ml   Net -300 ml        Physical Exam  Constitutional:       General: He is not in acute distress.     Appearance: Normal appearance.   Eyes:      General: Lids are normal. No scleral icterus.        Right eye: No discharge.         Left eye: No discharge.      Conjunctiva/sclera: Conjunctivae normal.   Neck:      Trachea: Phonation normal.   Cardiovascular:      Rate and Rhythm: Bradycardia present.       Comments: Monitor / Vital signs reviewed at time of visit  Pulmonary:      Effort: Pulmonary effort is normal. No tachypnea, accessory muscle usage or respiratory distress.   Abdominal:      General: There is no distension.   Musculoskeletal:      Left foot: Deformity (s/p partial amputation) present.   Skin:     Coloration: Skin is not cyanotic.   Neurological:      Mental Status: He is alert. He is not disoriented.   Psychiatric:         Attention and Perception: Attention normal.         Behavior: Behavior is cooperative.       Significant Labs:   Recent Labs   Lab 06/16/22  1100   HGBA1C 9.5*       Recent Labs   Lab 06/21/22  1732 06/21/22  2109 06/22/22  0735   POCTGLUCOSE 206* 244* 283*       Recent Labs   Lab 06/18/22  0328 06/20/22  0425 06/22/22  0416   WBC 4.86 6.22 6.33   HGB 10.3* 10.4* 10.2*   HCT 32.8* 33.4* 33.0*    342 320       Recent Labs   Lab 06/18/22  0328 06/20/22  0425 06/22/22  0416   GRAN 58.9  2.9 63.1  3.9 61.6  3.9   LYMPH 18.1  0.9* 16.6*  1.0 19.9  1.3   MONO 15.4*  0.8 11.7  0.7 9.6  0.6   EOS 0.3 0.5 0.5       Recent Labs   Lab 06/15/22  1633 06/16/22  1100 06/18/22 0328 06/20/22  0425 06/22/22  0416      < > 134* 135* 137   K 3.9   < > 4.3 4.3 4.7      < > 98 98 100   CO2 25   < > 25 28 30*   BUN 10   < > 13 20 26*   CREATININE 1.1   < > 1.2 1.4 1.7*   GLU 68*   < > 246* 281* 256*   CALCIUM 9.2   < > 9.2 9.3 9.0   ALBUMIN 2.8*   < > 2.4* 2.4* 2.6*   MG 1.7  --   --   --   --    PHOS 3.2  --   --   --   --     < > = values in this interval not displayed.       Recent Labs   Lab 06/15/22  1633 06/16/22  1100 06/18/22  0328 06/20/22  0425 06/22/22  0416   ALKPHOS 87   < > 73 69 76   ALT 11   < > 8* 9* 10   AST 12   < > 12 14 15   PROT 8.0   < > 7.1 7.0 6.7   BILITOT 0.4   < > 0.3 0.3 0.2   CRP 26.7*  --   --   --   --     < > = values in this interval not displayed.       Recent Labs   Lab 02/23/22  1407 06/15/22  1633   LACTATE  --  1.4   SEDRATE >120*  >120*       SARS-CoV2 (COVID-19) Qualitative PCR (no units)   Date Value   03/29/2021 Not Detected   03/15/2021 Not Detected   03/02/2021 Not Detected   02/22/2021 Not Detected   01/29/2021 Not Detected     SARS-CoV-2 RNA, Amplification, Qual (no units)   Date Value   02/03/2021 Negative     POC Rapid COVID (no units)   Date Value   06/15/2022 Negative   02/18/2021 Negative   12/08/2020 Negative     CTA Runoff ABD PEL Bilat Lower Ext  Narrative: EXAMINATION:  CTA RUNOFF ABD PEL BILAT LOWER EXT    CLINICAL HISTORY:  Claudication or leg ischemia;Peripheral arterial disease (PAD), asymptomatic;    TECHNIQUE:  CT angiogram of the abdomen/pelvis with lower extremity runoff using xyz mL of  Omnipaque 350 IV contrast.  Multiplanar reconstructions performed.    COMPARISON:  CT abdomen pelvis 02/03/2012    FINDINGS:  Upper extremities within the gantry resulting in beam hardening artifact limiting assessment.    Abdominopelvic Vasculature:    Visualized aorta demonstrates mild calcific atherosclerosis with no convincing evidence of dissection, aneurysm, high-grade stenosis.    Renal, superior mesenteric, and celiac arteries are patent.  GEORGIE is not well assessed due to artifact and poor opacification however appears patent with possible mild to moderate narrowing at the origin.    Common and external iliac arteries demonstrate no hemodynamically significant stenosis.  Bilateral internal iliac arteries demonstrate calcific and noncalcific atherosclerosis likely resulting in areas of moderate narrowing not well assessed due to artifact and poor opacification.    Right lower extremity:    Right common femoral and proximal superficial femoral arteries demonstrate no hemodynamically significant stenosis.  Right DFA with probable multifocal high-grade narrowing/occlusions versus poor opacification.  Moderate atherosclerotic disease of the mid to distal SFA with suspected areas of moderate to high-grade narrowing difficult to assess  due to poor opacification.    Right popliteal artery with suspected multifocal moderate to high-grade narrowing limited assessment due to poor opacification.    Moderate narrowing at the trifurcation.    Suspected moderate narrowing at the peroneal origin with severe atherosclerotic disease and probable occlusions distally.    Severe narrowing at the origin of the posterior tibial artery with multifocal severe atherosclerotic disease distally limiting assessment.    Suspected high-grade narrowing at the origin of the anterior tibial artery, otherwise patent.    Left lower extremity:    Left common femoral and superficial femoral arteries are likely patent.  Left DFA with suspected multifocal high-grade narrowing/occlusions versus poor opacification.    Suspected multifocal occlusions of the distal left popliteal artery.  Trifurcation with high-grade narrowing.    Severe atherosclerotic disease of the posterior tibial artery and suspected occlusion of the origin.    Multifocal occlusions or high-grade narrowing of the peroneal artery.    Anterior tibial artery with areas of patency otherwise there is severe atherosclerotic disease with multifocal high-grade narrowing and possible short-segment occlusions.    Bilateral lower extremities demonstrate diffuse subcutaneous edema and left-sided asymmetric muscular atrophy compared to the right.  Advanced degenerative changes of the knees.    Inferior Mediastinum/Heart: Normal.    Lung Bases: No nodules or consolidation.    Peritoneal Space: No ascites. No free air.    Liver: No focal mass.    Gallbladder: Tiny suspected gas foci within the gallbladder neck lumen which may suggest stones containing gas.    Bile Ducts: No evidence of dilated ducts.    Pancreas: No mass or peripancreatic fat stranding.    Spleen: Normal.    Adrenals: Unremarkable.    Bowel/Mesentery: No evidence of bowel obstruction or inflammation.  Diverticulosis coli.    Urinary Tract: No evidence of  obstructive uropathy. Kidneys enhance symmetrically.  Left renal 1.8 cm cyst.    Retroperitoneum:  No significant adenopathy.    Pelvis:  Prostatomegaly.    Thoracic/Abdominal wall:  Gynecomastia bilaterally.    Bones: Moderate degenerative changes of the spine with multilevel mild endplate compression deformities of the lumbar spine unchanged.  Impression: Poor contrast opacification of the vasculature limits assessment.    Severe atherosclerotic disease of the vasculature.  Multifocal high-grade narrowing or occlusions of the calf vessels.  Vascular narrowing/occlusions may be overestimated due to poor opacification.    Electronically signed by resident: Jaylan Griffin  Date:    06/16/2022  Time:    15:08    Electronically signed by: Eric Carlos MD  Date:    06/16/2022  Time:    16:18  VAS Toe Brachial Indices Resting  Indication  ========    Peripheral Arterial Disease    Pressure Lower  ============    Rt brachial A syst BP  178 mmHg  Rt PTA BP  255 mmHg  Rt dors pedis A  mmHg  Rt toe BP  130 mmHg  Right MARIO ratio use:   post. tibial  Rt MARIO post tibial (rt post tib A BP / max brach A BP) 1.43  Rt MARIO (rt dors ped A BP / max brach A BP) 1.43  Rt ankle BP / max brach A BP   1.43  Rt TBI (rt toe BP / max brach A BP)    0.73  Lt PTA BP  255 mmHg  Left MARIO ratio use:    post. tibial  Lt MARIO (lt post tibial A BP / max brach A BP)  1.43  Lt ankle BP / max brach A BP   1.43    PPG Lower  =========    Rt toe BP - PPG    130 mmHg  Rt toe digit waveform: severely dampened    Impression  =========    Right Leg: Segmental pressures are unreliable due to medial calcinosis; however, PVR waveforms suggest mild peripheral arterial  occlusive disease.  Left Leg: Segmental pressures are unreliable due to medial calcinosis; however, PVR waveforms suggest mild peripheral arterial  occlusive disease. Absent DPA signal.    -Bilateral Toe PPG's and Pressures were performed.  Rt lower digits: Toe PPG waveforms as described  above. - TBI of 0.73 with a Great toe pressure of 130 mmHg mmHg is noted.  Lt lower digits: History of foot amputation.    DATE OF SERVICE: 06/16/2022                                                      Sonographer: Leatha Devi RDMS, RVT  Electronically Signed by: ADAM Jauregui M.D. at 06/16/2022-10:53      Labs and Imaging within the last 24 hours listed above were reviewed.       Diet: Diet diabetic [facility]; 2000 Calorie; Cardiac (Low Na/Chol)  Significant LDAs:   IV Access Type: Peripheral  Urinary Catheter Indication if present: Patient Does Not Have Urinary Catheter  Other Lines/Tubes/Drains:    HIGH RISK CONDITION(S):   Patient has a condition that poses threat to life and bodily function: ischemic limb likely in need of amputation; vancomycin     Goals of Care:    Previous admission:  2/17/21  Likely prognosis:  Fair  Code Status: Full Code  Comfort Only: No  Hospice: No  Goals at discharge: remain at home, with physician follow-up    Discharge Planning:  OXANA: 6/25/2022     Code Status: Full Code   Is the patient medically ready for discharge?: No    Reason for patient still in hospital (select all that apply): Patient trending condition, Treatment, Consult recommendations, and Pending disposition  Discharge Plan A: Skilled Nursing Facility

## 2022-06-22 NOTE — ASSESSMENT & PLAN NOTE
-Last A1c 2020, repeat > 9%. Per patient, taking 68 units Lantus QHS along with metformin  mg and glipizide 10 mg daily (Possibly, he is not sure)  -Mildly hypoglycemic on admit with BG 63  -decreased dose of basal insulin to 15 units Levemir, Titrate up as needed. Added prandial insulin 6/19  -worsening hyperglycemia as oral agents wash out. Levemir increased to BID and aspart increased with meals.  - doses adjusted 6/21

## 2022-06-23 LAB
ALBUMIN SERPL BCP-MCNC: 2.6 G/DL (ref 3.5–5.2)
ANION GAP SERPL CALC-SCNC: 8 MMOL/L (ref 8–16)
BACTERIA SPEC AEROBE CULT: ABNORMAL
BUN SERPL-MCNC: 27 MG/DL (ref 8–23)
CALCIUM SERPL-MCNC: 9.1 MG/DL (ref 8.7–10.5)
CHLORIDE SERPL-SCNC: 99 MMOL/L (ref 95–110)
CK SERPL-CCNC: 131 U/L (ref 20–200)
CO2 SERPL-SCNC: 28 MMOL/L (ref 23–29)
CREAT SERPL-MCNC: 1.6 MG/DL (ref 0.5–1.4)
EST. GFR  (AFRICAN AMERICAN): 48.7 ML/MIN/1.73 M^2
EST. GFR  (NON AFRICAN AMERICAN): 42.1 ML/MIN/1.73 M^2
GLUCOSE SERPL-MCNC: 295 MG/DL (ref 70–110)
PHOSPHATE SERPL-MCNC: 3.2 MG/DL (ref 2.7–4.5)
POCT GLUCOSE: 214 MG/DL (ref 70–110)
POCT GLUCOSE: 236 MG/DL (ref 70–110)
POCT GLUCOSE: 253 MG/DL (ref 70–110)
POCT GLUCOSE: 269 MG/DL (ref 70–110)
POCT GLUCOSE: 272 MG/DL (ref 70–110)
POTASSIUM SERPL-SCNC: 4.2 MMOL/L (ref 3.5–5.1)
SODIUM SERPL-SCNC: 135 MMOL/L (ref 136–145)

## 2022-06-23 PROCEDURE — 80069 RENAL FUNCTION PANEL: CPT | Performed by: INTERNAL MEDICINE

## 2022-06-23 PROCEDURE — 36415 COLL VENOUS BLD VENIPUNCTURE: CPT | Performed by: PHYSICIAN ASSISTANT

## 2022-06-23 PROCEDURE — 97535 SELF CARE MNGMENT TRAINING: CPT

## 2022-06-23 PROCEDURE — 99233 PR SUBSEQUENT HOSPITAL CARE,LEVL III: ICD-10-PCS | Mod: ,,, | Performed by: PHYSICIAN ASSISTANT

## 2022-06-23 PROCEDURE — 25000003 PHARM REV CODE 250: Performed by: HOSPITALIST

## 2022-06-23 PROCEDURE — 97110 THERAPEUTIC EXERCISES: CPT

## 2022-06-23 PROCEDURE — 99233 SBSQ HOSP IP/OBS HIGH 50: CPT | Mod: ,,, | Performed by: PHYSICIAN ASSISTANT

## 2022-06-23 PROCEDURE — 99233 PR SUBSEQUENT HOSPITAL CARE,LEVL III: ICD-10-PCS | Mod: 95,,, | Performed by: INTERNAL MEDICINE

## 2022-06-23 PROCEDURE — 20600001 HC STEP DOWN PRIVATE ROOM

## 2022-06-23 PROCEDURE — 36415 COLL VENOUS BLD VENIPUNCTURE: CPT | Performed by: INTERNAL MEDICINE

## 2022-06-23 PROCEDURE — 99233 SBSQ HOSP IP/OBS HIGH 50: CPT | Mod: 95,,, | Performed by: INTERNAL MEDICINE

## 2022-06-23 PROCEDURE — 27000207 HC ISOLATION

## 2022-06-23 PROCEDURE — 25000003 PHARM REV CODE 250: Performed by: REGISTERED NURSE

## 2022-06-23 PROCEDURE — 82550 ASSAY OF CK (CPK): CPT | Performed by: PHYSICIAN ASSISTANT

## 2022-06-23 PROCEDURE — 25000003 PHARM REV CODE 250: Performed by: INTERNAL MEDICINE

## 2022-06-23 RX ORDER — HYDRALAZINE HYDROCHLORIDE 50 MG/1
50 TABLET, FILM COATED ORAL EVERY 8 HOURS PRN
Status: DISCONTINUED | OUTPATIENT
Start: 2022-06-23 | End: 2022-06-23

## 2022-06-23 RX ORDER — HYDRALAZINE HYDROCHLORIDE 50 MG/1
50 TABLET, FILM COATED ORAL EVERY 6 HOURS PRN
Status: DISCONTINUED | OUTPATIENT
Start: 2022-06-23 | End: 2022-06-28

## 2022-06-23 RX ORDER — INSULIN ASPART 100 [IU]/ML
15 INJECTION, SOLUTION INTRAVENOUS; SUBCUTANEOUS
Status: DISCONTINUED | OUTPATIENT
Start: 2022-06-24 | End: 2022-06-24

## 2022-06-23 RX ADMIN — METRONIDAZOLE 500 MG: 500 TABLET ORAL at 10:06

## 2022-06-23 RX ADMIN — NIFEDIPINE 90 MG: 30 TABLET, FILM COATED, EXTENDED RELEASE ORAL at 08:06

## 2022-06-23 RX ADMIN — METRONIDAZOLE 500 MG: 500 TABLET ORAL at 02:06

## 2022-06-23 RX ADMIN — INSULIN ASPART 3 UNITS: 100 INJECTION, SOLUTION INTRAVENOUS; SUBCUTANEOUS at 12:06

## 2022-06-23 RX ADMIN — TAMSULOSIN HYDROCHLORIDE 0.4 MG: 0.4 CAPSULE ORAL at 08:06

## 2022-06-23 RX ADMIN — INSULIN DETEMIR 20 UNITS: 100 INJECTION, SOLUTION SUBCUTANEOUS at 10:06

## 2022-06-23 RX ADMIN — Medication 1 CAPSULE: at 10:06

## 2022-06-23 RX ADMIN — METRONIDAZOLE 500 MG: 500 TABLET ORAL at 06:06

## 2022-06-23 RX ADMIN — INSULIN ASPART 1 UNITS: 100 INJECTION, SOLUTION INTRAVENOUS; SUBCUTANEOUS at 10:06

## 2022-06-23 RX ADMIN — INSULIN ASPART 2 UNITS: 100 INJECTION, SOLUTION INTRAVENOUS; SUBCUTANEOUS at 05:06

## 2022-06-23 RX ADMIN — Medication 1 CAPSULE: at 08:06

## 2022-06-23 RX ADMIN — ATORVASTATIN CALCIUM 80 MG: 20 TABLET, FILM COATED ORAL at 08:06

## 2022-06-23 RX ADMIN — INSULIN ASPART 3 UNITS: 100 INJECTION, SOLUTION INTRAVENOUS; SUBCUTANEOUS at 07:06

## 2022-06-23 RX ADMIN — GABAPENTIN 200 MG: 100 CAPSULE ORAL at 10:06

## 2022-06-23 RX ADMIN — INSULIN ASPART 12 UNITS: 100 INJECTION, SOLUTION INTRAVENOUS; SUBCUTANEOUS at 07:06

## 2022-06-23 RX ADMIN — ASPIRIN 81 MG: 81 TABLET, COATED ORAL at 08:06

## 2022-06-23 RX ADMIN — INSULIN ASPART 12 UNITS: 100 INJECTION, SOLUTION INTRAVENOUS; SUBCUTANEOUS at 05:06

## 2022-06-23 RX ADMIN — HYDRALAZINE HYDROCHLORIDE 50 MG: 50 TABLET ORAL at 10:06

## 2022-06-23 RX ADMIN — GABAPENTIN 200 MG: 100 CAPSULE ORAL at 08:06

## 2022-06-23 RX ADMIN — HYDRALAZINE HYDROCHLORIDE 25 MG: 25 TABLET, FILM COATED ORAL at 04:06

## 2022-06-23 RX ADMIN — INSULIN ASPART 12 UNITS: 100 INJECTION, SOLUTION INTRAVENOUS; SUBCUTANEOUS at 12:06

## 2022-06-23 NOTE — SUBJECTIVE & OBJECTIVE
Interval History:   No AEON.  Afebrile and WBC WNL.  Cr down to 1.6  OR Bone cultures L foot - Staph Epi - dapto sensitive.  Desires to try abx alone - defers surgical intervention.  The patient denies any recent fever, chills, or sweats.      Review of Systems   Constitutional:  Negative for chills, diaphoresis, fatigue and fever.   Respiratory:  Negative for cough and shortness of breath.    Cardiovascular:  Negative for chest pain and leg swelling.   Gastrointestinal:  Positive for diarrhea. Negative for abdominal pain and vomiting.   Genitourinary:  Negative for difficulty urinating.   Musculoskeletal:  Positive for gait problem. Negative for arthralgias, joint swelling and myalgias.   Skin:  Positive for wound. Negative for color change.   Neurological:  Positive for numbness. Negative for dizziness and headaches.   Psychiatric/Behavioral:  Negative for agitation and confusion. The patient is not nervous/anxious.    Objective:     Vital Signs (Most Recent):  Temp: 98.5 °F (36.9 °C) (06/23/22 0751)  Pulse: (!) 45 (06/23/22 0751)  Resp: 18 (06/23/22 0751)  BP: (!) 187/77 (06/23/22 0751)  SpO2: (!) 92 % (06/23/22 0751)   Vital Signs (24h Range):  Temp:  [98.1 °F (36.7 °C)-98.8 °F (37.1 °C)] 98.5 °F (36.9 °C)  Pulse:  [43-68] 45  Resp:  [18-29] 18  SpO2:  [92 %-96 %] 92 %  BP: (146-201)/(67-82) 187/77     Weight: 124.3 kg (274 lb 0.5 oz)  Body mass index is 40.47 kg/m².    Estimated Creatinine Clearance: 50.4 mL/min (A) (based on SCr of 1.7 mg/dL (H)).    Physical Exam  Constitutional:       General: He is not in acute distress.     Appearance: He is obese. He is not ill-appearing, toxic-appearing or diaphoretic.   HENT:      Head: Normocephalic.      Nose: Nose normal.      Mouth/Throat:      Mouth: Mucous membranes are moist.      Pharynx: Oropharynx is clear.   Eyes:      Conjunctiva/sclera: Conjunctivae normal.   Cardiovascular:      Rate and Rhythm: Normal rate and regular rhythm.   Pulmonary:      Effort:  Pulmonary effort is normal. No respiratory distress.      Breath sounds: Normal breath sounds.   Abdominal:      General: Abdomen is flat. There is no distension.      Palpations: Abdomen is soft.      Tenderness: There is no abdominal tenderness.   Musculoskeletal:         General: Swelling and deformity (L foot amputation) present.      Right lower leg: Edema present.      Left lower leg: Edema present.   Skin:     General: Skin is warm and dry.      Comments: Left heel wound dressed. See photo in media tab   Neurological:      Mental Status: He is alert and oriented to person, place, and time. Mental status is at baseline.      Sensory: Sensory deficit present.      Motor: No weakness.   Psychiatric:         Mood and Affect: Mood normal.         Behavior: Behavior normal.                 Significant Labs:   Microbiology Results (last 7 days)       Procedure Component Value Units Date/Time    Aerobic culture [[id number]]  (Abnormal)  (Susceptibility) Collected: 06/16/22 1308    Order Status: Completed Specimen: Bone from Foot, Left Updated: 06/23/22 1024     Aerobic Bacterial Culture STAPHYLOCOCCUS EPIDERMIDIS  Rare      Blood culture x two cultures. Draw prior to antibiotics. [[id number]] Collected: 06/15/22 1633    Order Status: Completed Specimen: Blood from Peripheral, Antecubital, Right Updated: 06/20/22 1812     Blood Culture, Routine No growth after 5 days.    Narrative:      Aerobic and anaerobic    Blood culture x two cultures. Draw prior to antibiotics. [[id number]] Collected: 06/15/22 1634    Order Status: Completed Specimen: Blood from Peripheral, Antecubital, Left Updated: 06/20/22 1812     Blood Culture, Routine No growth after 5 days.    Narrative:      Aerobic and anaerobic    Culture, Anaerobe [[id number]]  (Abnormal) Collected: 06/16/22 1305    Order Status: Completed Specimen: Wound from Foot, Left Updated: 06/20/22 1209     Anaerobic Culture PRESUMPTIVE PREVOTELLA/PORPHYROMONAS GROUP  Rare         PORPHYROMONAS SOMERAE  Rare      Culture, Anaerobe [[id number]] Collected: 06/16/22 1308    Order Status: Completed Specimen: Bone from Foot, Left Updated: 06/20/22 0736     Anaerobic Culture No anaerobes isolated    Aerobic culture [[id number]]  (Abnormal)  (Susceptibility) Collected: 06/16/22 1305    Order Status: Completed Specimen: Wound from Foot, Left Updated: 06/18/22 1124     Aerobic Bacterial Culture STREPTOCOCCUS AGALACTIAE (GROUP B)  Moderate  Beta-hemolytic streptococci are routinely susceptible to   penicillins,cephalosporins and carbapenems.  Susceptibility testing not routinely performed        METHICILLIN RESISTANT STAPHYLOCOCCUS AUREUS  Moderate      Urine culture [[id number]] Collected: 06/15/22 1817    Order Status: Completed Specimen: Urine Updated: 06/16/22 2030     Urine Culture, Routine No growth    Narrative:      Specimen Source->Urine    Gram stain [[id number]] Collected: 06/16/22 1308    Order Status: Completed Specimen: Bone from Foot, Left Updated: 06/16/22 1813     Gram Stain Result No WBC's      No organisms seen    Gram stain [[id number]] Collected: 06/16/22 1305    Order Status: Completed Specimen: Wound from Foot, Left Updated: 06/16/22 1803     Gram Stain Result Rare WBC's      Rare Gram positive cocci          Recent Lab Results  (Last 5 results in the past 24 hours)        06/23/22  0724   06/23/22  0439   06/22/22  2142   06/22/22  1519   06/22/22  1137        CPK   131             POCT Glucose 272     237   245   333                              Significant Imaging: I have reviewed all pertinent imaging results/findings within the past 24 hours.  CTA Runoff ABD PEL Bilat Lower Ext [[id number]] Resulted: 06/16/22 1618   Order Status: Completed Updated: 06/16/22 1621   Narrative:     EXAMINATION:   CTA RUNOFF ABD PEL BILAT LOWER EXT     CLINICAL HISTORY:   Claudication or leg ischemia;Peripheral arterial disease (PAD), asymptomatic;     TECHNIQUE:   CT angiogram of the abdomen/pelvis  with lower extremity runoff using xyz mL of  Omnipaque 350 IV contrast.  Multiplanar reconstructions performed.     COMPARISON:   CT abdomen pelvis 02/03/2012     FINDINGS:   Upper extremities within the gantry resulting in beam hardening artifact limiting assessment.     Abdominopelvic Vasculature:     Visualized aorta demonstrates mild calcific atherosclerosis with no convincing evidence of dissection, aneurysm, high-grade stenosis.     Renal, superior mesenteric, and celiac arteries are patent.  GEORGIE is not well assessed due to artifact and poor opacification however appears patent with possible mild to moderate narrowing at the origin.     Common and external iliac arteries demonstrate no hemodynamically significant stenosis.  Bilateral internal iliac arteries demonstrate calcific and noncalcific atherosclerosis likely resulting in areas of moderate narrowing not well assessed due to artifact and poor opacification.     Right lower extremity:     Right common femoral and proximal superficial femoral arteries demonstrate no hemodynamically significant stenosis.  Right DFA with probable multifocal high-grade narrowing/occlusions versus poor opacification.  Moderate atherosclerotic disease of the mid to distal SFA with suspected areas of moderate to high-grade narrowing difficult to assess due to poor opacification.     Right popliteal artery with suspected multifocal moderate to high-grade narrowing limited assessment due to poor opacification.     Moderate narrowing at the trifurcation.     Suspected moderate narrowing at the peroneal origin with severe atherosclerotic disease and probable occlusions distally.     Severe narrowing at the origin of the posterior tibial artery with multifocal severe atherosclerotic disease distally limiting assessment.     Suspected high-grade narrowing at the origin of the anterior tibial artery, otherwise patent.     Left lower extremity:     Left common femoral and superficial  femoral arteries are likely patent.  Left DFA with suspected multifocal high-grade narrowing/occlusions versus poor opacification.     Suspected multifocal occlusions of the distal left popliteal artery.  Trifurcation with high-grade narrowing.     Severe atherosclerotic disease of the posterior tibial artery and suspected occlusion of the origin.     Multifocal occlusions or high-grade narrowing of the peroneal artery.     Anterior tibial artery with areas of patency otherwise there is severe atherosclerotic disease with multifocal high-grade narrowing and possible short-segment occlusions.     Bilateral lower extremities demonstrate diffuse subcutaneous edema and left-sided asymmetric muscular atrophy compared to the right.  Advanced degenerative changes of the knees.     Inferior Mediastinum/Heart: Normal.     Lung Bases: No nodules or consolidation.     Peritoneal Space: No ascites. No free air.     Liver: No focal mass.     Gallbladder: Tiny suspected gas foci within the gallbladder neck lumen which may suggest stones containing gas.     Bile Ducts: No evidence of dilated ducts.     Pancreas: No mass or peripancreatic fat stranding.     Spleen: Normal.     Adrenals: Unremarkable.     Bowel/Mesentery: No evidence of bowel obstruction or inflammation.  Diverticulosis coli.     Urinary Tract: No evidence of obstructive uropathy. Kidneys enhance symmetrically.  Left renal 1.8 cm cyst.     Retroperitoneum:  No significant adenopathy.     Pelvis:  Prostatomegaly.     Thoracic/Abdominal wall:  Gynecomastia bilaterally.     Bones: Moderate degenerative changes of the spine with multilevel mild endplate compression deformities of the lumbar spine unchanged.    Impression:       Poor contrast opacification of the vasculature limits assessment.     Severe atherosclerotic disease of the vasculature.  Multifocal high-grade narrowing or occlusions of the calf vessels.  Vascular narrowing/occlusions may be overestimated due  to poor opacification.     Electronically signed by resident: Jaylan Griffin   Date: 06/16/2022   Time: 15:08     Electronically signed by: Eric Carlos MD   Date: 06/16/2022   Time: 16:18   VAS Toe Brachial Indices Resting [[id number]] Collected: 06/16/22 0948   Order Status: Completed Updated: 06/16/22 1056   Narrative:       Indication   ========     Peripheral Arterial Disease     Pressure Lower   ============     Rt brachial A syst BP  178 mmHg   Rt PTA BP  255 mmHg   Rt dors pedis A  mmHg   Rt toe BP  130 mmHg   Right MARIO ratio use:   post. tibial   Rt MARIO post tibial (rt post tib A BP / max brach A BP) 1.43   Rt MARIO (rt dors ped A BP / max brach A BP) 1.43   Rt ankle BP / max brach A BP   1.43   Rt TBI (rt toe BP / max brach A BP)    0.73   Lt PTA BP  255 mmHg   Left MARIO ratio use:    post. tibial   Lt MARIO (lt post tibial A BP / max brach A BP)  1.43   Lt ankle BP / max brach A BP   1.43     PPG Lower   =========     Rt toe BP - PPG    130 mmHg   Rt toe digit waveform: severely dampened     Impression   =========     Right Leg: Segmental pressures are unreliable due to medial calcinosis; however, PVR waveforms suggest mild peripheral arterial   occlusive disease.   Left Leg: Segmental pressures are unreliable due to medial calcinosis; however, PVR waveforms suggest mild peripheral arterial   occlusive disease. Absent DPA signal.     -Bilateral Toe PPG's and Pressures were performed.   Rt lower digits: Toe PPG waveforms as described above. - TBI of 0.73 with a Great toe pressure of 130 mmHg mmHg is noted.   Lt lower digits: History of foot amputation.       DATE OF SERVICE: 06/16/2022                                                        Sonographer: Leatha Devi RDMS, RVT   Electronically Signed by: ADAM Jauregui M.D. at 06/16/2022-10:53   US Lower Extremity Arteries Left [[id number]] (Abnormal) Resulted: 06/15/22 1808   Order Status: Completed Updated: 06/15/22 1811   Narrative:     EXAMINATION:   US  LOWER EXTREMITY ARTERIES LEFT     CLINICAL HISTORY:   Pain in leg, unspecified     TECHNIQUE:   Left lower extremity arterial duplex ultrasound examination performed. Multiple gray scale and color doppler images were obtained in addition to waveform analysis.     COMPARISON:   Ultrasound lower extremity arteries bilateral 12/09/2020     FINDINGS:   The peak systolic velocities on the left are as follows, in centimeters/second:     Common femoral artery: 174, triphasic     Superficial femoral artery, proximal: 130, triphasic     Superficial femoral artery, mid portion: 177, triphasic     Superficial femoral artery, distal: 144, triphasic     Popliteal artery: 140 proximally, monophasic and 89 distally, monophasic     Posterior tibial artery: 86, monophasic     Anterior tibial artery: 134, monophasic    Impression:       Monophasic waveform within the left distal lower extremity, consistent with atherosclerotic disease.  Areas of high-grade stenosis within the popliteal artery.     The left peroneal artery is not visualized, and may be occluded.     This report was flagged in Epic as abnormal.     Electronically signed by resident: Lissett Kearney   Date: 06/15/2022   Time: 17:59     Electronically signed by: Juan Hernandez MD   Date: 06/15/2022   Time: 18:08       Imaging History    2022  Date Procedure Name Study Review link PACS Link Status Accession Number Location   06/16/22 03:07 PM CTA Runoff ABD PEL Bilat Lower Ext  Study Review  Images Final [id number] [facility]   06/16/22 08:39 AM VAS Toe Brachial Indices Resting  Study Review  Images Final [id number]    06/15/22 05:28 PM US Lower Extremity Arteries Left  [facility]  [id number]   06/15/22 03:36 PM X-Ray Tibia Fibula 2 View Left  Study Review  Images Final [id number] [facility]

## 2022-06-23 NOTE — SUBJECTIVE & OBJECTIVE
Telemedicine  This service was provided by Virtual Visit.    Patient was transferred to [facility] on:  6/18/2022    Chief Complaint   Patient presents with    Wound Check     Sent from podiatry and stated in pain. L toes amputated     The patient location is: [id number] A   Admitted 6/15/2022  2:10 PM  Present with the patient at the time of the telemed/virtual assessment: Telepresenter    Interval History / Events Overnight:   The patient is able to provide adequate history. Additional history was obtained from past medical records. No significant events reported by Nursing. and On Call Provider contacted about hypertension  BP remains elevated.  Patient complains of nothing specific. Symptoms have been unchanged since yesterday. Associated symptoms include: fatigue. Symptoms are stable.     Lab test(s) reviewed: hyperglycemia, sCr stable.    Review of Systems   Constitutional:  Negative for fever.   Respiratory:  Negative for shortness of breath.      Objective:     Vital Signs (Most Recent):  Temp: 98.4 °F (36.9 °C) (06/23/22 1133)  Pulse: (!) 48 (06/23/22 1224)  Resp: 18 (06/23/22 1133)  BP: (!) 169/76 (06/23/22 1133)  SpO2: 95 % (06/23/22 1133)   Vital Signs (24h Range):  Temp:  [98.1 °F (36.7 °C)-98.8 °F (37.1 °C)] 98.4 °F (36.9 °C)  Pulse:  [43-68] 48  Resp:  [18-29] 18  SpO2:  [92 %-96 %] 95 %  BP: (146-201)/(67-82) 169/76     Weight: 124.3 kg (274 lb 0.5 oz)  Body mass index is 40.47 kg/m².    Intake/Output Summary (Last 24 hours) at 6/23/2022 1249  Last data filed at 6/23/2022 0643  Gross per 24 hour   Intake 200 ml   Output 1375 ml   Net -1175 ml        Physical Exam  Constitutional:       General: He is not in acute distress.     Appearance: Normal appearance.   Eyes:      General: Lids are normal. No scleral icterus.        Right eye: No discharge.         Left eye: No discharge.      Conjunctiva/sclera: Conjunctivae normal.   Neck:      Trachea: Phonation normal.   Cardiovascular:       Rate and Rhythm: Bradycardia present.      Comments: Monitor / Vital signs reviewed at time of visit  Pulmonary:      Effort: Pulmonary effort is normal. No tachypnea, accessory muscle usage or respiratory distress.   Abdominal:      General: There is no distension.   Musculoskeletal:      Left foot: Deformity (s/p partial amputation) present.   Skin:     Coloration: Skin is not cyanotic.   Neurological:      Mental Status: He is alert. He is not disoriented.   Psychiatric:         Attention and Perception: Attention normal.         Behavior: Behavior is cooperative.       Significant Labs:   Recent Labs   Lab 06/16/22  1100   HGBA1C 9.5*       Recent Labs   Lab 06/23/22  0724 06/23/22  1131 06/23/22  1235   POCTGLUCOSE 272* 269* 253*       Recent Labs   Lab 06/18/22 0328 06/20/22  0425 06/22/22  0416   WBC 4.86 6.22 6.33   HGB 10.3* 10.4* 10.2*   HCT 32.8* 33.4* 33.0*    342 320       Recent Labs   Lab 06/18/22 0328 06/20/22  0425 06/22/22  0416   GRAN 58.9  2.9 63.1  3.9 61.6  3.9   LYMPH 18.1  0.9* 16.6*  1.0 19.9  1.3   MONO 15.4*  0.8 11.7  0.7 9.6  0.6   EOS 0.3 0.5 0.5       Recent Labs   Lab 06/20/22  0425 06/22/22  0416 06/23/22  0951   * 137 135*   K 4.3 4.7 4.2   CL 98 100 99   CO2 28 30* 28   BUN 20 26* 27*   CREATININE 1.4 1.7* 1.6*   * 256* 295*   CALCIUM 9.3 9.0 9.1   ALBUMIN 2.4* 2.6* 2.6*   PHOS  --   --  3.2       Recent Labs   Lab 06/18/22 0328 06/20/22  0425 06/22/22  0416   ALKPHOS 73 69 76   ALT 8* 9* 10   AST 12 14 15   PROT 7.1 7.0 6.7   BILITOT 0.3 0.3 0.2       Recent Labs   Lab 02/23/22  1407 06/15/22  1633   LACTATE  --  1.4   SEDRATE >120* >120*       SARS-CoV2 (COVID-19) Qualitative PCR (no units)   Date Value   03/29/2021 Not Detected   03/15/2021 Not Detected   03/02/2021 Not Detected   02/22/2021 Not Detected   01/29/2021 Not Detected     SARS-CoV-2 RNA, Amplification, Qual (no units)   Date Value   02/03/2021 Negative     POC Rapid COVID (no units)    Date Value   06/15/2022 Negative   02/18/2021 Negative   12/08/2020 Negative     CTA Runoff ABD PEL Bilat Lower Ext  Narrative: EXAMINATION:  CTA RUNOFF ABD PEL BILAT LOWER EXT    CLINICAL HISTORY:  Claudication or leg ischemia;Peripheral arterial disease (PAD), asymptomatic;    TECHNIQUE:  CT angiogram of the abdomen/pelvis with lower extremity runoff using xyz mL of  Omnipaque 350 IV contrast.  Multiplanar reconstructions performed.    COMPARISON:  CT abdomen pelvis 02/03/2012    FINDINGS:  Upper extremities within the gantry resulting in beam hardening artifact limiting assessment.    Abdominopelvic Vasculature:    Visualized aorta demonstrates mild calcific atherosclerosis with no convincing evidence of dissection, aneurysm, high-grade stenosis.    Renal, superior mesenteric, and celiac arteries are patent.  GEORGIE is not well assessed due to artifact and poor opacification however appears patent with possible mild to moderate narrowing at the origin.    Common and external iliac arteries demonstrate no hemodynamically significant stenosis.  Bilateral internal iliac arteries demonstrate calcific and noncalcific atherosclerosis likely resulting in areas of moderate narrowing not well assessed due to artifact and poor opacification.    Right lower extremity:    Right common femoral and proximal superficial femoral arteries demonstrate no hemodynamically significant stenosis.  Right DFA with probable multifocal high-grade narrowing/occlusions versus poor opacification.  Moderate atherosclerotic disease of the mid to distal SFA with suspected areas of moderate to high-grade narrowing difficult to assess due to poor opacification.    Right popliteal artery with suspected multifocal moderate to high-grade narrowing limited assessment due to poor opacification.    Moderate narrowing at the trifurcation.    Suspected moderate narrowing at the peroneal origin with severe atherosclerotic disease and probable occlusions  distally.    Severe narrowing at the origin of the posterior tibial artery with multifocal severe atherosclerotic disease distally limiting assessment.    Suspected high-grade narrowing at the origin of the anterior tibial artery, otherwise patent.    Left lower extremity:    Left common femoral and superficial femoral arteries are likely patent.  Left DFA with suspected multifocal high-grade narrowing/occlusions versus poor opacification.    Suspected multifocal occlusions of the distal left popliteal artery.  Trifurcation with high-grade narrowing.    Severe atherosclerotic disease of the posterior tibial artery and suspected occlusion of the origin.    Multifocal occlusions or high-grade narrowing of the peroneal artery.    Anterior tibial artery with areas of patency otherwise there is severe atherosclerotic disease with multifocal high-grade narrowing and possible short-segment occlusions.    Bilateral lower extremities demonstrate diffuse subcutaneous edema and left-sided asymmetric muscular atrophy compared to the right.  Advanced degenerative changes of the knees.    Inferior Mediastinum/Heart: Normal.    Lung Bases: No nodules or consolidation.    Peritoneal Space: No ascites. No free air.    Liver: No focal mass.    Gallbladder: Tiny suspected gas foci within the gallbladder neck lumen which may suggest stones containing gas.    Bile Ducts: No evidence of dilated ducts.    Pancreas: No mass or peripancreatic fat stranding.    Spleen: Normal.    Adrenals: Unremarkable.    Bowel/Mesentery: No evidence of bowel obstruction or inflammation.  Diverticulosis coli.    Urinary Tract: No evidence of obstructive uropathy. Kidneys enhance symmetrically.  Left renal 1.8 cm cyst.    Retroperitoneum:  No significant adenopathy.    Pelvis:  Prostatomegaly.    Thoracic/Abdominal wall:  Gynecomastia bilaterally.    Bones: Moderate degenerative changes of the spine with multilevel mild endplate compression deformities of  the lumbar spine unchanged.  Impression: Poor contrast opacification of the vasculature limits assessment.    Severe atherosclerotic disease of the vasculature.  Multifocal high-grade narrowing or occlusions of the calf vessels.  Vascular narrowing/occlusions may be overestimated due to poor opacification.    Electronically signed by resident: Jaylan Griffin  Date:    06/16/2022  Time:    15:08    Electronically signed by: Eric Carlos MD  Date:    06/16/2022  Time:    16:18  VAS Toe Brachial Indices Resting  Indication  ========    Peripheral Arterial Disease    Pressure Lower  ============    Rt brachial A syst BP  178 mmHg  Rt PTA BP  255 mmHg  Rt dors pedis A  mmHg  Rt toe BP  130 mmHg  Right MARIO ratio use:   post. tibial  Rt MARIO post tibial (rt post tib A BP / max brach A BP) 1.43  Rt MARIO (rt dors ped A BP / max brach A BP) 1.43  Rt ankle BP / max brach A BP   1.43  Rt TBI (rt toe BP / max brach A BP)    0.73  Lt PTA BP  255 mmHg  Left MARIO ratio use:    post. tibial  Lt MARIO (lt post tibial A BP / max brach A BP)  1.43  Lt ankle BP / max brach A BP   1.43    PPG Lower  =========    Rt toe BP - PPG    130 mmHg  Rt toe digit waveform: severely dampened    Impression  =========    Right Leg: Segmental pressures are unreliable due to medial calcinosis; however, PVR waveforms suggest mild peripheral arterial  occlusive disease.  Left Leg: Segmental pressures are unreliable due to medial calcinosis; however, PVR waveforms suggest mild peripheral arterial  occlusive disease. Absent DPA signal.    -Bilateral Toe PPG's and Pressures were performed.  Rt lower digits: Toe PPG waveforms as described above. - TBI of 0.73 with a Great toe pressure of 130 mmHg mmHg is noted.  Lt lower digits: History of foot amputation.    DATE OF SERVICE: 06/16/2022                                                      Sonographer: Leatha Devi RDMS, RVT  Electronically Signed by: ADAM Jauregui M.D. at 06/16/2022-10:53      Labs and  Imaging within the last 24 hours listed above were reviewed.       Diet: Diet diabetic [facility]; 2000 Calorie; Cardiac (Low Na/Chol)  Significant LDAs:   IV Access Type: Peripheral  Urinary Catheter Indication if present: Patient Does Not Have Urinary Catheter  Other Lines/Tubes/Drains:    HIGH RISK CONDITION(S):   Patient has a condition that poses threat to life and bodily function: limb ischemia likely to prevent wound healing      Goals of Care:    Previous admission:  2/17/21  Likely prognosis:  Fair  Code Status: Full Code  Comfort Only: No  Hospice: No  Goals at discharge: remain at home, with physician follow-up    Discharge Planning:  OXANA: 6/27/2022     Code Status: Full Code   Is the patient medically ready for discharge?: No    Reason for patient still in hospital (select all that apply): Patient trending condition, Treatment, and Pending disposition  Discharge Plan A: Skilled Nursing Facility

## 2022-06-23 NOTE — PROGRESS NOTES
[facility] - Telemetry Stepdown  Infectious Disease  Progress Note    Patient Name: Saji Castañeda  MRN: [id number]  Admission Date: 6/15/2022  Length of Stay: 8 days  Attending Physician: Salma Strauss MD  Primary Care Provider: Mart Escalante MD    Isolation Status: Contact  Assessment/Plan:      * Osteomyelitis of left lower extremity     72 year old male with history of DM2 with peripheral neuropathy, left midtarsal amputation 2010, left calcaneal osteomyelitis s/p treatment in the past who was admitted for infected left heel ulceration. Tib/fib xray of the left leg notable for osteomyelitis of the posterior calcaneus. Vascular studies concerning for left lower leg arterial stenosis and calf vessel narrowing/occulsions.    Wound culture is positive for GBS, MRSA, prevotella and porphromonas.   Bone culture is positive for Staph species. Pathology pending.     Patient was currently on ceftriaxone, vancomycin and flagyl. Fevers resolved. No leukocytosis. Vascular surgery recommends AKA vs BKA. Patient decided to attempt abx and defers sx.  Mild YNES likely from vanc.  Staph Epi on bone cultures    Recommendations:  -  Continue Daptomycin 8mg/kg IV q24h as covers for MRSA and GBS on wound cx and Staph Epi on bone cx - plan for 6 weeks the po abx x 6 months as is suspected chronic oste given seen on Xray  - Continue Flagyl x 2 weeks total  - weekly labs as below  - Discussed that efficacy of IV abx in the setting of vascular dz of RLE and likely would have minimal benefit and ultimately may need amputation at some point.   - PICC placement  - Rec patient follow up with ID and POD at wound care center at [place] as well as Dr. Wu ([facility] sx) for evaluation for vascular salvage treatment  - Discussed with ID staff   - Will sign off    Outpatient Antibiotic Therapy Plan:    Please send referral to [facility].    1) Infection: Osteomyelitis L calcaneus    2) Discharge  Antibiotics:    Intravenous antibiotics:   Daptomycin 8mg/kg IV q24 h    Oral antibiotics:   Flagyl 500mg po tid x 10d to complete today of 14d    3) Therapy Duration:  6 weeks of Daptomycin    Estimated end date of IV antibiotics: 7/28/22    4) Outpatient Weekly Labs:    Order the following labs to be drawn on Mondays:    CBC   CMP    ESR    CRP   CPK (when on Daptomycin)      5) Fax Lab Results to Infectious Diseases Provider: Patel WORLEY MPH    [facility] Fax Number: [phone]    6) Outpatient Infectious Diseases Follow-up     Follow-up appointment will be arranged by the ID clinic and will be found in the patient's appointments tab.     Prior to discharge, please ensure the patient's follow-up has been scheduled.     If there is still no follow-up scheduled prior to discharge, please send an EPIC message to [facility] in Infectious Diseases.              Foot pain, left  See A/P above    Peripheral arterial disease     See assessment and plan below        Anticipated Disposition: tbd    Thank you for your consult. I will sign off. Please contact us if you have any additional questions.    JOHNNY Goel  Infectious Disease  [facility] - [facility]    Subjective:     Principal Problem:Osteomyelitis of left lower extremity    HPI: Mr. Castañeda is a 72 year old male with PMH of DM2 with peripheral neuropathy, left foot amputation at the midtarsal joint, osteomyelitis s/p left high midfoot amputation who was referred to [facility] ED following podiatry clinic evaluation of diabetic foot wound. Pt was seen in podiatry clinic on 6/7 and was found to chronic nonhealing left heal with nonviable tissues, BKA was discussed, but pt was not interested. He followed up one week later on 6/15 where the wound was found to be extremely malodorous, posterior leg ulceration with increased drainage and depth. The left hellp stump lesion was found to have increased exposed bone. Therefore the pt  was sent to the ED for further eval and IV abx therapy.     To note, pt has minimal ability to ambulate at baseline, uses a wheelchair for locomotion.     Since admission, pt has remained hypertensive, with an isolated fever of 100.7 today on 6/17, remains on RA. No leukocytosis noted, elevated sed rate (>120), elevated CRP (27), low albumin noted, UA without concerns for infection. Blood cultures on 6/15 NGTD, urine culture NGTD. Wound culture from left foot + streptococcus agalactiae (GBS), staph aureus, sensitives pending. Bone culture from left foot pending.     Left lower leg arterial US was concerning for significant stenosis within the popliteal artery. Mild peripheral arterial disease noted on left/right leg ABIs. CTA significant for severe arthrosclerotic disease of the vasculature, narrowing/occlusions of the calf vessels. Tib/fib xray of the left leg notable for ulcer crater down to bone with osteomyelitis of the posterior calcaneus.    Pt is on cefepime and vancomycin. ID was consulted for suspected osteomyelitis left calcaneus.           Interval History:   No AEON.  Afebrile and WBC WNL.  Cr down to 1.6  OR Bone cultures L foot - Staph Epi - dapto sensitive.  Desires to try abx alone - defers surgical intervention.  The patient denies any recent fever, chills, or sweats.      Review of Systems   Constitutional:  Negative for chills, diaphoresis, fatigue and fever.   Respiratory:  Negative for cough and shortness of breath.    Cardiovascular:  Negative for chest pain and leg swelling.   Gastrointestinal:  Positive for diarrhea. Negative for abdominal pain and vomiting.   Genitourinary:  Negative for difficulty urinating.   Musculoskeletal:  Positive for gait problem. Negative for arthralgias, joint swelling and myalgias.   Skin:  Positive for wound. Negative for color change.   Neurological:  Positive for numbness. Negative for dizziness and headaches.   Psychiatric/Behavioral:  Negative for agitation  and confusion. The patient is not nervous/anxious.    Objective:     Vital Signs (Most Recent):  Temp: 98.5 °F (36.9 °C) (06/23/22 0751)  Pulse: (!) 45 (06/23/22 0751)  Resp: 18 (06/23/22 0751)  BP: (!) 187/77 (06/23/22 0751)  SpO2: (!) 92 % (06/23/22 0751)   Vital Signs (24h Range):  Temp:  [98.1 °F (36.7 °C)-98.8 °F (37.1 °C)] 98.5 °F (36.9 °C)  Pulse:  [43-68] 45  Resp:  [18-29] 18  SpO2:  [92 %-96 %] 92 %  BP: (146-201)/(67-82) 187/77     Weight: 124.3 kg (274 lb 0.5 oz)  Body mass index is 40.47 kg/m².    Estimated Creatinine Clearance: 50.4 mL/min (A) (based on SCr of 1.7 mg/dL (H)).    Physical Exam  Constitutional:       General: He is not in acute distress.     Appearance: He is obese. He is not ill-appearing, toxic-appearing or diaphoretic.   HENT:      Head: Normocephalic.      Nose: Nose normal.      Mouth/Throat:      Mouth: Mucous membranes are moist.      Pharynx: Oropharynx is clear.   Eyes:      Conjunctiva/sclera: Conjunctivae normal.   Cardiovascular:      Rate and Rhythm: Normal rate and regular rhythm.   Pulmonary:      Effort: Pulmonary effort is normal. No respiratory distress.      Breath sounds: Normal breath sounds.   Abdominal:      General: Abdomen is flat. There is no distension.      Palpations: Abdomen is soft.      Tenderness: There is no abdominal tenderness.   Musculoskeletal:         General: Swelling and deformity (L foot amputation) present.      Right lower leg: Edema present.      Left lower leg: Edema present.   Skin:     General: Skin is warm and dry.      Comments: Left heel wound dressed. See photo in media tab   Neurological:      Mental Status: He is alert and oriented to person, place, and time. Mental status is at baseline.      Sensory: Sensory deficit present.      Motor: No weakness.   Psychiatric:         Mood and Affect: Mood normal.         Behavior: Behavior normal.                 Significant Labs:   Microbiology Results (last 7 days)       Procedure Component  Value Units Date/Time    Aerobic culture [[id number]]  (Abnormal)  (Susceptibility) Collected: 06/16/22 1308    Order Status: Completed Specimen: Bone from Foot, Left Updated: 06/23/22 1024     Aerobic Bacterial Culture STAPHYLOCOCCUS EPIDERMIDIS  Rare      Blood culture x two cultures. Draw prior to antibiotics. [[id number]] Collected: 06/15/22 1633    Order Status: Completed Specimen: Blood from Peripheral, Antecubital, Right Updated: 06/20/22 1812     Blood Culture, Routine No growth after 5 days.    Narrative:      Aerobic and anaerobic    Blood culture x two cultures. Draw prior to antibiotics. [[id number]] Collected: 06/15/22 1634    Order Status: Completed Specimen: Blood from Peripheral, Antecubital, Left Updated: 06/20/22 1812     Blood Culture, Routine No growth after 5 days.    Narrative:      Aerobic and anaerobic    Culture, Anaerobe [[id number]]  (Abnormal) Collected: 06/16/22 1305    Order Status: Completed Specimen: Wound from Foot, Left Updated: 06/20/22 1209     Anaerobic Culture PRESUMPTIVE PREVOTELLA/PORPHYROMONAS GROUP  Rare        PORPHYROMONAS SOMERAE  Rare      Culture, Anaerobe [[id number]] Collected: 06/16/22 1308    Order Status: Completed Specimen: Bone from Foot, Left Updated: 06/20/22 0736     Anaerobic Culture No anaerobes isolated    Aerobic culture [[id number]]  (Abnormal)  (Susceptibility) Collected: 06/16/22 1305    Order Status: Completed Specimen: Wound from Foot, Left Updated: 06/18/22 1124     Aerobic Bacterial Culture STREPTOCOCCUS AGALACTIAE (GROUP B)  Moderate  Beta-hemolytic streptococci are routinely susceptible to   penicillins,cephalosporins and carbapenems.  Susceptibility testing not routinely performed        METHICILLIN RESISTANT STAPHYLOCOCCUS AUREUS  Moderate      Urine culture [[id number]] Collected: 06/15/22 1817    Order Status: Completed Specimen: Urine Updated: 06/16/22 2030     Urine Culture, Routine No growth    Narrative:      Specimen Source->Urine    Gram stain  [[id number]] Collected: 06/16/22 1308    Order Status: Completed Specimen: Bone from Foot, Left Updated: 06/16/22 1813     Gram Stain Result No WBC's      No organisms seen    Gram stain [[id number]] Collected: 06/16/22 1305    Order Status: Completed Specimen: Wound from Foot, Left Updated: 06/16/22 1803     Gram Stain Result Rare WBC's      Rare Gram positive cocci          Recent Lab Results  (Last 5 results in the past 24 hours)        06/23/22  0724   06/23/22  0439   06/22/22  2142   06/22/22  1519   06/22/22  1137        CPK   131             POCT Glucose 272     237   245   333                              Significant Imaging: I have reviewed all pertinent imaging results/findings within the past 24 hours.  CTA Runoff ABD PEL Bilat Lower Ext [[id number]] Resulted: 06/16/22 1618   Order Status: Completed Updated: 06/16/22 1621   Narrative:     EXAMINATION:   CTA RUNOFF ABD PEL BILAT LOWER EXT     CLINICAL HISTORY:   Claudication or leg ischemia;Peripheral arterial disease (PAD), asymptomatic;     TECHNIQUE:   CT angiogram of the abdomen/pelvis with lower extremity runoff using xyz mL of  Omnipaque 350 IV contrast.  Multiplanar reconstructions performed.     COMPARISON:   CT abdomen pelvis 02/03/2012     FINDINGS:   Upper extremities within the gantry resulting in beam hardening artifact limiting assessment.     Abdominopelvic Vasculature:     Visualized aorta demonstrates mild calcific atherosclerosis with no convincing evidence of dissection, aneurysm, high-grade stenosis.     Renal, superior mesenteric, and celiac arteries are patent.  GEORGIE is not well assessed due to artifact and poor opacification however appears patent with possible mild to moderate narrowing at the origin.     Common and external iliac arteries demonstrate no hemodynamically significant stenosis.  Bilateral internal iliac arteries demonstrate calcific and noncalcific atherosclerosis likely resulting in areas of moderate narrowing not well  assessed due to artifact and poor opacification.     Right lower extremity:     Right common femoral and proximal superficial femoral arteries demonstrate no hemodynamically significant stenosis.  Right DFA with probable multifocal high-grade narrowing/occlusions versus poor opacification.  Moderate atherosclerotic disease of the mid to distal SFA with suspected areas of moderate to high-grade narrowing difficult to assess due to poor opacification.     Right popliteal artery with suspected multifocal moderate to high-grade narrowing limited assessment due to poor opacification.     Moderate narrowing at the trifurcation.     Suspected moderate narrowing at the peroneal origin with severe atherosclerotic disease and probable occlusions distally.     Severe narrowing at the origin of the posterior tibial artery with multifocal severe atherosclerotic disease distally limiting assessment.     Suspected high-grade narrowing at the origin of the anterior tibial artery, otherwise patent.     Left lower extremity:     Left common femoral and superficial femoral arteries are likely patent.  Left DFA with suspected multifocal high-grade narrowing/occlusions versus poor opacification.     Suspected multifocal occlusions of the distal left popliteal artery.  Trifurcation with high-grade narrowing.     Severe atherosclerotic disease of the posterior tibial artery and suspected occlusion of the origin.     Multifocal occlusions or high-grade narrowing of the peroneal artery.     Anterior tibial artery with areas of patency otherwise there is severe atherosclerotic disease with multifocal high-grade narrowing and possible short-segment occlusions.     Bilateral lower extremities demonstrate diffuse subcutaneous edema and left-sided asymmetric muscular atrophy compared to the right.  Advanced degenerative changes of the knees.     Inferior Mediastinum/Heart: Normal.     Lung Bases: No nodules or consolidation.     Peritoneal  Space: No ascites. No free air.     Liver: No focal mass.     Gallbladder: Tiny suspected gas foci within the gallbladder neck lumen which may suggest stones containing gas.     Bile Ducts: No evidence of dilated ducts.     Pancreas: No mass or peripancreatic fat stranding.     Spleen: Normal.     Adrenals: Unremarkable.     Bowel/Mesentery: No evidence of bowel obstruction or inflammation.  Diverticulosis coli.     Urinary Tract: No evidence of obstructive uropathy. Kidneys enhance symmetrically.  Left renal 1.8 cm cyst.     Retroperitoneum:  No significant adenopathy.     Pelvis:  Prostatomegaly.     Thoracic/Abdominal wall:  Gynecomastia bilaterally.     Bones: Moderate degenerative changes of the spine with multilevel mild endplate compression deformities of the lumbar spine unchanged.    Impression:       Poor contrast opacification of the vasculature limits assessment.     Severe atherosclerotic disease of the vasculature.  Multifocal high-grade narrowing or occlusions of the calf vessels.  Vascular narrowing/occlusions may be overestimated due to poor opacification.     Electronically signed by resident: Jaylan Griffin   Date: 06/16/2022   Time: 15:08     Electronically signed by: Eric Carlos MD   Date: 06/16/2022   Time: 16:18   VAS Toe Brachial Indices Resting [[id number]] Collected: 06/16/22 0948   Order Status: Completed Updated: 06/16/22 1056   Narrative:       Indication   ========     Peripheral Arterial Disease     Pressure Lower   ============     Rt brachial A syst BP  178 mmHg   Rt PTA BP  255 mmHg   Rt dors pedis A  mmHg   Rt toe BP  130 mmHg   Right MARIO ratio use:   post. tibial   Rt MARIO post tibial (rt post tib A BP / max brach A BP) 1.43   Rt MARIO (rt dors ped A BP / max brach A BP) 1.43   Rt ankle BP / max brach A BP   1.43   Rt TBI (rt toe BP / max brach A BP)    0.73   Lt PTA BP  255 mmHg   Left MARIO ratio use:    post. tibial   Lt MARIO (lt post tibial A BP / max brach A BP)  1.43   Lt  ankle BP / max brach A BP   1.43     PPG Lower   =========     Rt toe BP - PPG    130 mmHg   Rt toe digit waveform: severely dampened     Impression   =========     Right Leg: Segmental pressures are unreliable due to medial calcinosis; however, PVR waveforms suggest mild peripheral arterial   occlusive disease.   Left Leg: Segmental pressures are unreliable due to medial calcinosis; however, PVR waveforms suggest mild peripheral arterial   occlusive disease. Absent DPA signal.     -Bilateral Toe PPG's and Pressures were performed.   Rt lower digits: Toe PPG waveforms as described above. - TBI of 0.73 with a Great toe pressure of 130 mmHg mmHg is noted.   Lt lower digits: History of foot amputation.       DATE OF SERVICE: 06/16/2022                                                        Sonographer: Leatha Devi RDMS, RVT   Electronically Signed by: ADAM Jauregui M.D. at 06/16/2022-10:53   US Lower Extremity Arteries Left [[id number]] (Abnormal) Resulted: 06/15/22 1808   Order Status: Completed Updated: 06/15/22 1811   Narrative:     EXAMINATION:   US LOWER EXTREMITY ARTERIES LEFT     CLINICAL HISTORY:   Pain in leg, unspecified     TECHNIQUE:   Left lower extremity arterial duplex ultrasound examination performed. Multiple gray scale and color doppler images were obtained in addition to waveform analysis.     COMPARISON:   Ultrasound lower extremity arteries bilateral 12/09/2020     FINDINGS:   The peak systolic velocities on the left are as follows, in centimeters/second:     Common femoral artery: 174, triphasic     Superficial femoral artery, proximal: 130, triphasic     Superficial femoral artery, mid portion: 177, triphasic     Superficial femoral artery, distal: 144, triphasic     Popliteal artery: 140 proximally, monophasic and 89 distally, monophasic     Posterior tibial artery: 86, monophasic     Anterior tibial artery: 134, monophasic    Impression:       Monophasic waveform within the left distal lower  extremity, consistent with atherosclerotic disease.  Areas of high-grade stenosis within the popliteal artery.     The left peroneal artery is not visualized, and may be occluded.     This report was flagged in Epic as abnormal.     Electronically signed by resident: Lissett Kearney   Date: 06/15/2022   Time: 17:59     Electronically signed by: Juan Hernandez MD   Date: 06/15/2022   Time: 18:08       Imaging History    2022  Date Procedure Name Study Review link PACS Link Status Accession Number Location   06/16/22 03:07 PM CTA Runoff ABD PEL Bilat Lower Ext  Study Review  Images Final [id number] [facility]   06/16/22 08:39 AM VAS Toe Brachial Indices Resting  Study Review  Images Final [id number]    06/15/22 05:28 PM US Lower Extremity Arteries Left  Study Review  Images Final [id number] [facility]   06/15/22 03:36 PM X-Ray Tibia Fibula 2 View Left  Study Review  Images Final [id number] [facility]

## 2022-06-23 NOTE — ASSESSMENT & PLAN NOTE
72 year old male with history of DM2 with peripheral neuropathy, left midtarsal amputation 2010, left calcaneal osteomyelitis s/p treatment in the past who was admitted for infected left heel ulceration. Tib/fib xray of the left leg notable for osteomyelitis of the posterior calcaneus. Vascular studies concerning for left lower leg arterial stenosis and calf vessel narrowing/occulsions.    Wound culture is positive for GBS, MRSA, prevotella and porphromonas.   Bone culture is positive for Staph species. Pathology pending.     Patient was currently on ceftriaxone, vancomycin and flagyl. Fevers resolved. No leukocytosis. Vascular surgery recommends AKA vs BKA. Patient decided to attempt abx and defers sx.  Mild YNES likely from vanc.  Staph Epi on bone cultures    Recommendations:  -  Continue Daptomycin 8mg/kg IV q24h as covers for MRSA and GBS on wound cx and Staph Epi on bone cx - plan for 6 weeks the po abx x 6 months as is suspected chronic oste given seen on Xray  - Continue Flagyl x 2 weeks total  - weekly labs as below  - Discussed that efficacy of IV abx in the setting of vascular dz of RLE and likely would have minimal benefit and ultimately may need amputation at some point.   - PICC placement  - Rec patient follow up with ID and POD at wound care center at [place] as well as Dr. Wu ([facility] sx) for evaluation for vascular salvage treatment  - Discussed with ID staff   - Will sign off    Outpatient Antibiotic Therapy Plan:    Please send referral to [facility].    1) Infection: Osteomyelitis L calcaneus    2) Discharge Antibiotics:    Intravenous antibiotics:   Daptomycin 8mg/kg IV q24 h    Oral antibiotics:   Flagyl 500mg po tid x 10d to complete today of 14d    3) Therapy Duration:  6 weeks of Daptomycin    Estimated end date of IV antibiotics: 7/28/22    4) Outpatient Weekly Labs:    Order the following labs to be drawn on Mondays:    CBC   CMP    ESR     CRP   CPK (when on Daptomycin)      5) Fax Lab Results to Infectious Diseases Provider: Patel WORLEY MPH    [facility] Fax Number: [phone]    6) Outpatient Infectious Diseases Follow-up     Follow-up appointment will be arranged by the ID clinic and will be found in the patient's appointments tab.     Prior to discharge, please ensure the patient's follow-up has been scheduled.     If there is still no follow-up scheduled prior to discharge, please send an EPIC message to [facility] in Infectious Diseases.

## 2022-06-23 NOTE — PLAN OF CARE
06/23/22 1450   Post-Acute Status   Post-Acute Authorization Placement   Post-Acute Placement Status Pending medical clearance/testing   Spoke with Yulissa ([phone]) in admissions at [facility] who reports patient is accepted pending insurance auth and family completing paperwork. Informed of current OXANA 6/27/22.    Toshia Dennis LMSW  [facility]  Ext. [phone]

## 2022-06-23 NOTE — PLAN OF CARE
Problem: Adult Inpatient Plan of Care  Goal: Plan of Care Review  Outcome: Ongoing, Progressing  Goal: Patient-Specific Goal (Individualized)  Outcome: Ongoing, Progressing  Goal: Absence of Hospital-Acquired Illness or Injury  Outcome: Ongoing, Progressing  Goal: Optimal Comfort and Wellbeing  Outcome: Ongoing, Progressing  POC reviewed with pt. A&Ox4. Hypertensive. MD notified. PRN medication administered. Mild relief obtained. BG monitored. WC complete. Safety checks performed. Bed in lowest position. Wheels locked. Call light in reach. Will continue to monitor.

## 2022-06-23 NOTE — ASSESSMENT & PLAN NOTE
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 S/p bone biopsy.  -ID consulted. Cultures growing Staph aureus and GBS   - L foot wound anaerobic culture growing Prevotella and Porphyromonas Somerae. PO Flagyl added.    - L foot bone culture growing Staph species, continued IV ceftriaxone and vancomycin (per Pharmacy dosing).  - with acute renal insufficiency, vancomycin changed to daptomycin and ceftriaxone discontinued as not needed based on current cultures. Continuing Flagyl

## 2022-06-23 NOTE — PT/OT/SLP PROGRESS
Occupational Therapy   Treatment    Name: Saji Castañeda  MRN: [id number]  Admitting Diagnosis:  Osteomyelitis of left lower extremity       Recommendations:     Discharge Recommendations: nursing facility, skilled  Discharge Equipment Recommendations:  wheelchair, bedside commode, walker, rolling  Barriers to discharge:  Decreased caregiver support    Assessment:     Saji Castañeda is a 72 y.o. male with a medical diagnosis of Osteomyelitis of left lower extremity.  He presents with deficits in self-care tasks and mobility. Pt. Noted to have pain reported in left heel on this date.Pt. continues to be a high fall risk and would benefit from continued OT services.  Performance deficits affecting function are weakness, impaired endurance, impaired self care skills, impaired functional mobilty, gait instability, pain, decreased lower extremity function.     Rehab Prognosis:  Good; patient would benefit from acute skilled OT services to address these deficits and reach maximum level of function.       Plan:     Patient to be seen 3 x/week to address the above listed problems via self-care/home management, therapeutic activities, therapeutic exercises  · Plan of Care Expires: 07/21/22  · Plan of Care Reviewed with: patient    Subjective   Pt. Reported his left heel was hurting today  Pain/Comfort:  · Pain Rating 1: 5/10  · Location - Side 1: Left  · Location 1: heel  · Pain Rating Post-Intervention 1: 5/10    Objective:     Communicated with: nurse prior to session.  Patient found supine with peripheral IV upon OT entry to room.    General Precautions: Standard, fall, contact   Orthopedic Precautions:LLE partial weight bearing (through heel for transfers only)   Braces:  (Darco shoe reportedly wears on RLE)  Respiratory Status: Room air     Occupational Performance:     Bed Mobility:    · Patient completed Supine to Sit with stand by assistance  · Patient completed Sit to Supine with contact guard assistance     Functional  Mobility/Transfers:  · Patient completed Sit <> Stand Transfer with Min  assistance  with  rolling walker x 2 trials from EOB and maintained stand with RW with NWB on LLE and CGA  · Functional Mobility: not tested    Activities of Daily Living:  · Bathing: moderate assistance with assist to wipe buttocks region when standing with NWB in LLE  · Upper Body Dressing: minimum assistance to don gown   · LBD: Total A to don Darco shoe      AMPAC 6 Click ADL: 17    Treatment & Education:  Pt. Performed 2 sest 10 reps of BUE/BLE AROM there ex for all major planes of available motion seated EOB    Patient left supine with all lines intact and call button in reachEducation:      GOALS:   Multidisciplinary Problems     Occupational Therapy Goals        Problem: Occupational Therapy    Goal Priority Disciplines Outcome Interventions   Occupational Therapy Goal     OT, PT/OT Ongoing, Progressing    Description: Goals to be met by: 07-02-22     Patient will increase functional independence with ADLs by performing:    UE Dressing with Set-up Assistance.  LE Dressing with Minimal Assistance.  Grooming while seated with Set-up Assistance.  Toileting from bedside commode with Stand-by Assistance for hygiene and clothing management.   Supine to sit with Stand-by Assistance.  Stand pivot transfers with Stand-by Assistance with RW and PWB in left heel only  Toilet transfer to bedside commode with Stand-by Assistance.  Pt. To perform scoot pivot transfer with Supervision to and from the wheelchair                     Time Tracking:     OT Date of Treatment: 06/23/22  OT Start Time: 1506  OT Stop Time: 1531  OT Total Time (min): 25 min    Billable Minutes:Self Care/Home Management 15  Therapeutic Exercise 10    OT/KATHLEEN: OT          6/23/2022

## 2022-06-23 NOTE — ASSESSMENT & PLAN NOTE
-Last A1c 2020, repeat > 9%. Per patient, taking 68 units Lantus QHS along with metformin  mg and glipizide 10 mg daily (Possibly, he is not sure)  -Mildly hypoglycemic on admit with BG 63  -decreased dose of basal insulin to 15 units Levemir, Titrate up as needed. Added prandial insulin 6/19  -worsening hyperglycemia as oral agents wash out. Levemir increased to BID and aspart increased with meals.  - doses adjusted 6/21; increased for 6/23

## 2022-06-23 NOTE — PROGRESS NOTES
[facility] - Telemetry [facility] Medicine  Telemedicine Progress Note    Patient Name: Saji Castañeda  MRN: [id number]  Patient Class: IP- Inpatient   Admission Date: 6/15/2022  Length of Stay: 7 days  Attending Physician: Salma Strauss MD  Primary Care Provider: Mart Escalante MD      Subjective:     Principal Problem:Osteomyelitis of left lower extremity    HPI:  71 y.o. M with PMHx DM2 and PVD with chronic diabetic foot wounds s/p L sided BKA, HTN, asymptomatic bradycardia, CKD 3 and HLD who presented to the ED for worsening R foot pain and drainage. Pt. With known chronic ulceration/osteomylitis to L heel and ID has recommended BKA for definitive therapy, but the patient reports that he has not been interested in amputation. Today, he had his usual f/u appointment with ID, and the patient was referred to the ED for imaging with bone biopsy and potential abx because of worsening drainage and pain. Pt. Denies any current fevers, chills, nausea, or other systemic signs of infection.      Overview/Hospital Course:  Patient admitted to hospital medicine. Podiatry and vascular surgery consulted. Patient underwent bone biopsy and culture. Infectious disease consulted.       Telemedicine  This service was provided by Virtual Visit.    Patient was transferred to [facility] on:  6/18/2022    Chief Complaint   Patient presents with    Wound Check     Sent from podiatry and stated in pain. L toes amputated     The patient location is: 8092/8092 A   Admitted 6/15/2022  2:10 PM  Present with the patient at the time of the telemed/virtual assessment: Telepresenter    Interval History / Events Overnight:   The patient is able to provide adequate history. Additional history was obtained from past medical records. No significant events reported by Nursing.   Patient complains of diarrhea. Symptoms have resolved since yesterday. Associated symptoms include: fatigue. Symptoms are stable.     Lab test(s)  reviewed: hyperglycemia, vancomycin level improved but sCr has increased.    Review of Systems   Constitutional:  Negative for fever.   Respiratory:  Negative for shortness of breath.      Objective:     Vital Signs (Most Recent):  Temp: 98.5 °F (36.9 °C) (06/22/22 0736)  Pulse: (!) 46 (06/22/22 0853)  Resp: 18 (06/22/22 0736)  BP: (!) 163/74 (06/22/22 0736)  SpO2: (!) 92 % (06/22/22 0736)   Vital Signs (24h Range):  Temp:  [96.6 °F (35.9 °C)-98.5 °F (36.9 °C)] 98.5 °F (36.9 °C)  Pulse:  [46-78] 46  Resp:  [15-24] 18  SpO2:  [92 %-97 %] 92 %  BP: (161-176)/(69-77) 163/74     Weight: 124.3 kg (274 lb 0.5 oz)  Body mass index is 40.47 kg/m².    Intake/Output Summary (Last 24 hours) at 6/22/2022 1112  Last data filed at 6/22/2022 0432  Gross per 24 hour   Intake --   Output 300 ml   Net -300 ml        Physical Exam  Constitutional:       General: He is not in acute distress.     Appearance: Normal appearance.   Eyes:      General: Lids are normal. No scleral icterus.        Right eye: No discharge.         Left eye: No discharge.      Conjunctiva/sclera: Conjunctivae normal.   Neck:      Trachea: Phonation normal.   Cardiovascular:      Rate and Rhythm: Bradycardia present.      Comments: Monitor / Vital signs reviewed at time of visit  Pulmonary:      Effort: Pulmonary effort is normal. No tachypnea, accessory muscle usage or respiratory distress.   Abdominal:      General: There is no distension.   Musculoskeletal:      Left foot: Deformity (s/p partial amputation) present.   Skin:     Coloration: Skin is not cyanotic.   Neurological:      Mental Status: He is alert. He is not disoriented.   Psychiatric:         Attention and Perception: Attention normal.         Behavior: Behavior is cooperative.       Significant Labs:   Recent Labs   Lab 06/16/22  1100   HGBA1C 9.5*       Recent Labs   Lab 06/21/22  1732 06/21/22  2109 06/22/22  0735   POCTGLUCOSE 206* 244* 283*       Recent Labs   Lab 06/18/22  0327  06/20/22 0425 06/22/22  0416   WBC 4.86 6.22 6.33   HGB 10.3* 10.4* 10.2*   HCT 32.8* 33.4* 33.0*    342 320       Recent Labs   Lab 06/18/22  0328 06/20/22 0425 06/22/22 0416   GRAN 58.9  2.9 63.1  3.9 61.6  3.9   LYMPH 18.1  0.9* 16.6*  1.0 19.9  1.3   MONO 15.4*  0.8 11.7  0.7 9.6  0.6   EOS 0.3 0.5 0.5       Recent Labs   Lab 06/15/22  1633 06/16/22  1100 06/18/22 0328 06/20/22 0425 06/22/22 0416      < > 134* 135* 137   K 3.9   < > 4.3 4.3 4.7      < > 98 98 100   CO2 25   < > 25 28 30*   BUN 10   < > 13 20 26*   CREATININE 1.1   < > 1.2 1.4 1.7*   GLU 68*   < > 246* 281* 256*   CALCIUM 9.2   < > 9.2 9.3 9.0   ALBUMIN 2.8*   < > 2.4* 2.4* 2.6*   MG 1.7  --   --   --   --    PHOS 3.2  --   --   --   --     < > = values in this interval not displayed.       Recent Labs   Lab 06/15/22  1633 06/16/22  1100 06/18/22 0328 06/20/22 0425 06/22/22 0416   ALKPHOS 87   < > 73 69 76   ALT 11   < > 8* 9* 10   AST 12   < > 12 14 15   PROT 8.0   < > 7.1 7.0 6.7   BILITOT 0.4   < > 0.3 0.3 0.2   CRP 26.7*  --   --   --   --     < > = values in this interval not displayed.       Recent Labs   Lab 02/23/22  1407 06/15/22  1633   LACTATE  --  1.4   SEDRATE >120* >120*       SARS-CoV2 (COVID-19) Qualitative PCR (no units)   Date Value   03/29/2021 Not Detected   03/15/2021 Not Detected   03/02/2021 Not Detected   02/22/2021 Not Detected   01/29/2021 Not Detected     SARS-CoV-2 RNA, Amplification, Qual (no units)   Date Value   02/03/2021 Negative     POC Rapid COVID (no units)   Date Value   06/15/2022 Negative   02/18/2021 Negative   12/08/2020 Negative     CTA Runoff ABD PEL Bilat Lower Ext  Narrative: EXAMINATION:  CTA RUNOFF ABD PEL BILAT LOWER EXT    CLINICAL HISTORY:  Claudication or leg ischemia;Peripheral arterial disease (PAD), asymptomatic;    TECHNIQUE:  CT angiogram of the abdomen/pelvis with lower extremity runoff using xyz mL of  Omnipaque 350 IV contrast.  Multiplanar  reconstructions performed.    COMPARISON:  CT abdomen pelvis 02/03/2012    FINDINGS:  Upper extremities within the gantry resulting in beam hardening artifact limiting assessment.    Abdominopelvic Vasculature:    Visualized aorta demonstrates mild calcific atherosclerosis with no convincing evidence of dissection, aneurysm, high-grade stenosis.    Renal, superior mesenteric, and celiac arteries are patent.  GEORGIE is not well assessed due to artifact and poor opacification however appears patent with possible mild to moderate narrowing at the origin.    Common and external iliac arteries demonstrate no hemodynamically significant stenosis.  Bilateral internal iliac arteries demonstrate calcific and noncalcific atherosclerosis likely resulting in areas of moderate narrowing not well assessed due to artifact and poor opacification.    Right lower extremity:    Right common femoral and proximal superficial femoral arteries demonstrate no hemodynamically significant stenosis.  Right DFA with probable multifocal high-grade narrowing/occlusions versus poor opacification.  Moderate atherosclerotic disease of the mid to distal SFA with suspected areas of moderate to high-grade narrowing difficult to assess due to poor opacification.    Right popliteal artery with suspected multifocal moderate to high-grade narrowing limited assessment due to poor opacification.    Moderate narrowing at the trifurcation.    Suspected moderate narrowing at the peroneal origin with severe atherosclerotic disease and probable occlusions distally.    Severe narrowing at the origin of the posterior tibial artery with multifocal severe atherosclerotic disease distally limiting assessment.    Suspected high-grade narrowing at the origin of the anterior tibial artery, otherwise patent.    Left lower extremity:    Left common femoral and superficial femoral arteries are likely patent.  Left DFA with suspected multifocal high-grade narrowing/occlusions  versus poor opacification.    Suspected multifocal occlusions of the distal left popliteal artery.  Trifurcation with high-grade narrowing.    Severe atherosclerotic disease of the posterior tibial artery and suspected occlusion of the origin.    Multifocal occlusions or high-grade narrowing of the peroneal artery.    Anterior tibial artery with areas of patency otherwise there is severe atherosclerotic disease with multifocal high-grade narrowing and possible short-segment occlusions.    Bilateral lower extremities demonstrate diffuse subcutaneous edema and left-sided asymmetric muscular atrophy compared to the right.  Advanced degenerative changes of the knees.    Inferior Mediastinum/Heart: Normal.    Lung Bases: No nodules or consolidation.    Peritoneal Space: No ascites. No free air.    Liver: No focal mass.    Gallbladder: Tiny suspected gas foci within the gallbladder neck lumen which may suggest stones containing gas.    Bile Ducts: No evidence of dilated ducts.    Pancreas: No mass or peripancreatic fat stranding.    Spleen: Normal.    Adrenals: Unremarkable.    Bowel/Mesentery: No evidence of bowel obstruction or inflammation.  Diverticulosis coli.    Urinary Tract: No evidence of obstructive uropathy. Kidneys enhance symmetrically.  Left renal 1.8 cm cyst.    Retroperitoneum:  No significant adenopathy.    Pelvis:  Prostatomegaly.    Thoracic/Abdominal wall:  Gynecomastia bilaterally.    Bones: Moderate degenerative changes of the spine with multilevel mild endplate compression deformities of the lumbar spine unchanged.  Impression: Poor contrast opacification of the vasculature limits assessment.    Severe atherosclerotic disease of the vasculature.  Multifocal high-grade narrowing or occlusions of the calf vessels.  Vascular narrowing/occlusions may be overestimated due to poor opacification.    Electronically signed by resident: Jaylan Griffin  Date:    06/16/2022  Time:    15:08    Electronically  signed by: Eric Carlos MD  Date:    06/16/2022  Time:    16:18  VAS Toe Brachial Indices Resting  Indication  ========    Peripheral Arterial Disease    Pressure Lower  ============    Rt brachial A syst BP  178 mmHg  Rt PTA BP  255 mmHg  Rt dors pedis A  mmHg  Rt toe BP  130 mmHg  Right MARIO ratio use:   post. tibial  Rt MARIO post tibial (rt post tib A BP / max brach A BP) 1.43  Rt MARIO (rt dors ped A BP / max brach A BP) 1.43  Rt ankle BP / max brach A BP   1.43  Rt TBI (rt toe BP / max brach A BP)    0.73  Lt PTA BP  255 mmHg  Left MARIO ratio use:    post. tibial  Lt MARIO (lt post tibial A BP / max brach A BP)  1.43  Lt ankle BP / max brach A BP   1.43    PPG Lower  =========    Rt toe BP - PPG    130 mmHg  Rt toe digit waveform: severely dampened    Impression  =========    Right Leg: Segmental pressures are unreliable due to medial calcinosis; however, PVR waveforms suggest mild peripheral arterial  occlusive disease.  Left Leg: Segmental pressures are unreliable due to medial calcinosis; however, PVR waveforms suggest mild peripheral arterial  occlusive disease. Absent DPA signal.    -Bilateral Toe PPG's and Pressures were performed.  Rt lower digits: Toe PPG waveforms as described above. - TBI of 0.73 with a Great toe pressure of 130 mmHg mmHg is noted.  Lt lower digits: History of foot amputation.    DATE OF SERVICE: 06/16/2022                                                      Sonographer: Leatha Devi RDMS, RVT  Electronically Signed by: ADAM Jauregui M.D. at 06/16/2022-10:53      Labs and Imaging within the last 24 hours listed above were reviewed.       Diet: Diet diabetic [facility]; 2000 Calorie; Cardiac (Low Na/Chol)  Significant LDAs:   IV Access Type: Peripheral  Urinary Catheter Indication if present: Patient Does Not Have Urinary Catheter  Other Lines/Tubes/Drains:    HIGH RISK CONDITION(S):   Patient has a condition that poses threat to life and bodily function: ischemic limb likely  in need of amputation; vancomycin     Goals of Care:    Previous admission:  2/17/21  Likely prognosis:  Fair  Code Status: Full Code  Comfort Only: No  Hospice: No  Goals at discharge: remain at home, with physician follow-up    Discharge Planning:  OXANA: 6/25/2022     Code Status: Full Code   Is the patient medically ready for discharge?: No    Reason for patient still in hospital (select all that apply): Patient trending condition, Treatment, Consult recommendations, and Pending disposition  Discharge Plan A: Skilled Nursing Facility       Assessment/Plan:      * Osteomyelitis of left lower extremity  -With acute on chronic osteomyleitis of LLE including heel with purulent foul smelling drainage  -Podiatry consulted, Not septic appearing. S/p bone biopsy.  -ID consulted. Cultures growing Staph aureus and GBS   - L foot wound anaerobic culture growing Prevotella and Porphyromonas Somerae. PO Flagyl added.    - L foot bone culture growing Staph species, continued IV ceftriaxone and vancomycin (per Pharmacy dosing).  - with acute renal insufficiency, vancomycin changed to daptomycin and ceftriaxone discontinued as not needed based on current cultures. Continuing Flagyl    Acute renal insufficiency  Per med history, patient no longer taking ARB.  sCr up slightly and vanco level > 20.  Discontinued ARB, holding diuretic for now. Trial of gentle hydration ineffective.  Discontinuing vancomycin.    Peripheral arterial disease  -PVD contributing to current non-healing foot wound. Arterial US shows LLE atherosclerotic disease, areas of high-grade stenosis within the L popliteal artery  -Continue home statin, ASA  -Vascular Surgery consulted - patient considering surgery (BKA); seems reluctant primarily due to the time he expects it to take to obtain a prosthetic limb. Wants to try conservative management with antibiotics.    Asymptomatic Mobitz (type) I (Wenckebach's) atrioventricular block  -Bradycardic in ED but  asymptomatic, chronic issue  -Repeat EKG 6/19:  BPM 44 Normal sinus rhythm - AV block, 2:1 - Nonspecific T wave abnormality     Chronic kidney disease, stage III (moderate)  -sCr baseline 1.1  -Continue to monitor    Type 2 diabetes, uncontrolled, with neuropathy  -Last A1c 2020, repeat > 9%. Per patient, taking 68 units Lantus QHS along with metformin  mg and glipizide 10 mg daily (Possibly, he is not sure)  -Mildly hypoglycemic on admit with BG 63  -decreased dose of basal insulin to 15 units Levemir, Titrate up as needed. Added prandial insulin 6/19  -worsening hyperglycemia as oral agents wash out. Levemir increased to BID and aspart increased with meals.  - doses adjusted 6/21; increased for 6/23    Essential hypertension  Continue losartan 100mg and HCTZ 25 mg, with hydralazine 25 mg PO q8h PRN  Added nifedipine 6/18  Consulted PharmD for med history.  Discontinue ARB, hold HCTZ due to increasing sCr. Continue nifedipine for BP control.        Active Hospital Problems    Diagnosis  POA    *Osteomyelitis of left lower extremity [M86.9]  Yes    Acute renal insufficiency [N28.9]  No    Foot pain, left [M79.672]  Yes    Peripheral arterial disease [I73.9]  Yes     Chronic    Asymptomatic Mobitz (type) I (Wenckebach's) atrioventricular block [I44.1]  Yes    Chronic kidney disease, stage III (moderate) [N18.30]  Yes    Type 2 diabetes, uncontrolled, with neuropathy [E11.40, E11.65]  Yes     Chronic    Essential hypertension [I10]  Yes     Chronic      Resolved Hospital Problems   No resolved problems to display.       Inpatient Medications Prescribed for Management of Current Problems:     Scheduled Meds:    aspirin  81 mg Oral Daily    atorvastatin  80 mg Oral Daily    DAPTOmycin (CUBICIN)  IV  8 mg/kg Intravenous Q24H    gabapentin  200 mg Oral BID    [START ON 6/23/2022] insulin aspart U-100  12 Units Subcutaneous TIDWM    [START ON 6/23/2022] insulin detemir U-100  17 Units Subcutaneous BID     Lactobacillus rhamnosus GG  1 capsule Oral BID    metroNIDAZOLE  500 mg Oral Q8H    NIFEdipine  90 mg Oral Daily    tamsulosin  0.4 mg Oral Daily     Continuous Infusions:   As Needed: acetaminophen, albuterol-ipratropium, aluminum-magnesium hydroxide-simethicone, dextrose 10%, dextrose 10%, glucagon (human recombinant), glucose, glucose, hydrALAZINE, insulin aspart U-100, melatonin, naloxone, ondansetron, oxyCODONE-acetaminophen, prochlorperazine, sars-cov-2 (covid-19), sodium chloride 0.9%    VTE Risk Mitigation (From admission, onward)         Ordered     IP VTE HIGH RISK PATIENT  Once         06/15/22 1957     Place sequential compression device  Until discontinued         06/15/22 1957              I have assessed these finding virtually using telemed platform and with assistance of bedside nurse     The attending portion of this evaluation, treatment, and documentation was performed per Salma Strauss MD via Telemedicine AudioVisual using the secure [employer] software platform with 2 way audio/video. The provider was located off-site and the patient is located in the hospital. The aforementioned video software was utilized to document the relevant history and physical exam    Salma Strauss MD  Department of Hospital Medicine   [facility] - Telemetry Stepdown

## 2022-06-23 NOTE — ASSESSMENT & PLAN NOTE
Per med history, patient no longer taking ARB.  sCr up slightly and vanco level > 20.  Discontinued ARB, holding diuretic for now. Trial of gentle hydration ineffective.  Discontinuing vancomycin.

## 2022-06-23 NOTE — CONSULTS
VANCOMYCIN DOSING BY PHARMACY DISCONTINUATION NOTE    Saji Castañeda is a 72 y.o. male who had been consulted for vancomycin dosing.    The pharmacy consult for vancomycin dosing has been discontinued.     Vancomycin Dosing by Pharmacy Consult will sign-off. Please reconsult if necessary. Thank you for allowing us to participate in this patient's care.     Sarahy Abebe, PharmD, BCPS  [id number]

## 2022-06-23 NOTE — ASSESSMENT & PLAN NOTE
Continue losartan 100mg and HCTZ 25 mg, with hydralazine 25 mg PO q8h PRN  Added nifedipine 6/18  Consulted PharmD for med history.  Discontinue ARB, hold HCTZ due to increasing sCr. Continue nifedipine for BP control.

## 2022-06-24 LAB
ALBUMIN SERPL BCP-MCNC: 2.6 G/DL (ref 3.5–5.2)
ALP SERPL-CCNC: 71 U/L (ref 55–135)
ALT SERPL W/O P-5'-P-CCNC: 10 U/L (ref 10–44)
ANION GAP SERPL CALC-SCNC: 9 MMOL/L (ref 8–16)
AST SERPL-CCNC: 18 U/L (ref 10–40)
BASOPHILS # BLD AUTO: 0.03 K/UL (ref 0–0.2)
BASOPHILS NFR BLD: 0.4 % (ref 0–1.9)
BILIRUB SERPL-MCNC: 0.2 MG/DL (ref 0.1–1)
BUN SERPL-MCNC: 24 MG/DL (ref 8–23)
CALCIUM SERPL-MCNC: 8.9 MG/DL (ref 8.7–10.5)
CHLORIDE SERPL-SCNC: 100 MMOL/L (ref 95–110)
CO2 SERPL-SCNC: 24 MMOL/L (ref 23–29)
CREAT SERPL-MCNC: 1.5 MG/DL (ref 0.5–1.4)
DIFFERENTIAL METHOD: ABNORMAL
EOSINOPHIL # BLD AUTO: 0.5 K/UL (ref 0–0.5)
EOSINOPHIL NFR BLD: 6.9 % (ref 0–8)
ERYTHROCYTE [DISTWIDTH] IN BLOOD BY AUTOMATED COUNT: 16.3 % (ref 11.5–14.5)
EST. GFR  (AFRICAN AMERICAN): 52.6 ML/MIN/1.73 M^2
EST. GFR  (NON AFRICAN AMERICAN): 45.5 ML/MIN/1.73 M^2
GLUCOSE SERPL-MCNC: 229 MG/DL (ref 70–110)
HCT VFR BLD AUTO: 32.3 % (ref 40–54)
HGB BLD-MCNC: 10.1 G/DL (ref 14–18)
IMM GRANULOCYTES # BLD AUTO: 0.02 K/UL (ref 0–0.04)
IMM GRANULOCYTES NFR BLD AUTO: 0.3 % (ref 0–0.5)
LYMPHOCYTES # BLD AUTO: 1.4 K/UL (ref 1–4.8)
LYMPHOCYTES NFR BLD: 19.3 % (ref 18–48)
MCH RBC QN AUTO: 25.3 PG (ref 27–31)
MCHC RBC AUTO-ENTMCNC: 31.3 G/DL (ref 32–36)
MCV RBC AUTO: 81 FL (ref 82–98)
MONOCYTES # BLD AUTO: 0.7 K/UL (ref 0.3–1)
MONOCYTES NFR BLD: 9 % (ref 4–15)
NEUTROPHILS # BLD AUTO: 4.6 K/UL (ref 1.8–7.7)
NEUTROPHILS NFR BLD: 64.1 % (ref 38–73)
NRBC BLD-RTO: 0 /100 WBC
PLATELET # BLD AUTO: 333 K/UL (ref 150–450)
PMV BLD AUTO: 10 FL (ref 9.2–12.9)
POCT GLUCOSE: 224 MG/DL (ref 70–110)
POCT GLUCOSE: 228 MG/DL (ref 70–110)
POCT GLUCOSE: 242 MG/DL (ref 70–110)
POCT GLUCOSE: 249 MG/DL (ref 70–110)
POCT GLUCOSE: 260 MG/DL (ref 70–110)
POCT GLUCOSE: 278 MG/DL (ref 70–110)
POTASSIUM SERPL-SCNC: 4.4 MMOL/L (ref 3.5–5.1)
PROT SERPL-MCNC: 6.5 G/DL (ref 6–8.4)
RBC # BLD AUTO: 4 M/UL (ref 4.6–6.2)
SODIUM SERPL-SCNC: 133 MMOL/L (ref 136–145)
WBC # BLD AUTO: 7.24 K/UL (ref 3.9–12.7)

## 2022-06-24 PROCEDURE — 36415 COLL VENOUS BLD VENIPUNCTURE: CPT | Performed by: STUDENT IN AN ORGANIZED HEALTH CARE EDUCATION/TRAINING PROGRAM

## 2022-06-24 PROCEDURE — 99233 PR SUBSEQUENT HOSPITAL CARE,LEVL III: ICD-10-PCS | Mod: 95,,, | Performed by: INTERNAL MEDICINE

## 2022-06-24 PROCEDURE — 25000003 PHARM REV CODE 250: Performed by: HOSPITALIST

## 2022-06-24 PROCEDURE — 25000003 PHARM REV CODE 250: Performed by: REGISTERED NURSE

## 2022-06-24 PROCEDURE — 85025 COMPLETE CBC W/AUTO DIFF WBC: CPT | Performed by: STUDENT IN AN ORGANIZED HEALTH CARE EDUCATION/TRAINING PROGRAM

## 2022-06-24 PROCEDURE — 63600175 PHARM REV CODE 636 W HCPCS: Performed by: PHYSICIAN ASSISTANT

## 2022-06-24 PROCEDURE — 25000003 PHARM REV CODE 250: Performed by: INTERNAL MEDICINE

## 2022-06-24 PROCEDURE — C9399 UNCLASSIFIED DRUGS OR BIOLOG: HCPCS | Performed by: INTERNAL MEDICINE

## 2022-06-24 PROCEDURE — 20600001 HC STEP DOWN PRIVATE ROOM

## 2022-06-24 PROCEDURE — 36573 INSJ PICC RS&I 5 YR+: CPT

## 2022-06-24 PROCEDURE — A4216 STERILE WATER/SALINE, 10 ML: HCPCS | Performed by: INTERNAL MEDICINE

## 2022-06-24 PROCEDURE — 27000207 HC ISOLATION

## 2022-06-24 PROCEDURE — 99233 SBSQ HOSP IP/OBS HIGH 50: CPT | Mod: 95,,, | Performed by: INTERNAL MEDICINE

## 2022-06-24 PROCEDURE — 25000003 PHARM REV CODE 250: Performed by: PHYSICIAN ASSISTANT

## 2022-06-24 PROCEDURE — 80053 COMPREHEN METABOLIC PANEL: CPT | Performed by: STUDENT IN AN ORGANIZED HEALTH CARE EDUCATION/TRAINING PROGRAM

## 2022-06-24 PROCEDURE — C1751 CATH, INF, PER/CENT/MIDLINE: HCPCS

## 2022-06-24 PROCEDURE — 76937 US GUIDE VASCULAR ACCESS: CPT

## 2022-06-24 RX ORDER — INSULIN ASPART 100 [IU]/ML
0-5 INJECTION, SOLUTION INTRAVENOUS; SUBCUTANEOUS
Refills: 0 | Status: ON HOLD
Start: 2022-06-24 | End: 2023-03-15 | Stop reason: HOSPADM

## 2022-06-24 RX ORDER — INSULIN ASPART 100 [IU]/ML
15 INJECTION, SOLUTION INTRAVENOUS; SUBCUTANEOUS
Refills: 0
Start: 2022-06-24 | End: 2022-06-27 | Stop reason: SDUPTHER

## 2022-06-24 RX ORDER — MUPIROCIN 20 MG/G
OINTMENT TOPICAL 2 TIMES DAILY
Status: COMPLETED | OUTPATIENT
Start: 2022-06-24 | End: 2022-06-29

## 2022-06-24 RX ORDER — METRONIDAZOLE 500 MG/1
500 TABLET ORAL EVERY 8 HOURS
Qty: 90 TABLET | Refills: 1
Start: 2022-06-24 | End: 2022-06-24 | Stop reason: SDUPTHER

## 2022-06-24 RX ORDER — HYDROCHLOROTHIAZIDE 25 MG/1
12.5 TABLET ORAL DAILY
Start: 2022-06-28 | End: 2022-06-30 | Stop reason: SDUPTHER

## 2022-06-24 RX ORDER — SODIUM CHLORIDE 0.9 % (FLUSH) 0.9 %
10 SYRINGE (ML) INJECTION
Status: DISCONTINUED | OUTPATIENT
Start: 2022-06-24 | End: 2022-07-08

## 2022-06-24 RX ORDER — INSULIN ASPART 100 [IU]/ML
18 INJECTION, SOLUTION INTRAVENOUS; SUBCUTANEOUS
Status: DISCONTINUED | OUTPATIENT
Start: 2022-06-25 | End: 2022-06-26

## 2022-06-24 RX ORDER — HYDRALAZINE HYDROCHLORIDE 50 MG/1
50 TABLET, FILM COATED ORAL EVERY 12 HOURS
Start: 2022-06-24 | End: 2022-06-29 | Stop reason: SDUPTHER

## 2022-06-24 RX ORDER — METRONIDAZOLE 500 MG/1
500 TABLET ORAL EVERY 8 HOURS
Start: 2022-06-24 | End: 2022-07-08 | Stop reason: HOSPADM

## 2022-06-24 RX ORDER — INSULIN GLARGINE 100 [IU]/ML
40 INJECTION, SOLUTION SUBCUTANEOUS NIGHTLY
Refills: 0
Start: 2022-06-24 | End: 2022-07-08 | Stop reason: SDUPTHER

## 2022-06-24 RX ORDER — SODIUM CHLORIDE 0.9 % (FLUSH) 0.9 %
10 SYRINGE (ML) INJECTION EVERY 6 HOURS
Status: DISCONTINUED | OUTPATIENT
Start: 2022-06-24 | End: 2022-07-08

## 2022-06-24 RX ORDER — NIFEDIPINE 90 MG/1
90 TABLET, EXTENDED RELEASE ORAL DAILY
Start: 2022-06-25 | End: 2023-03-09 | Stop reason: DRUGHIGH

## 2022-06-24 RX ADMIN — Medication 1 CAPSULE: at 09:06

## 2022-06-24 RX ADMIN — INSULIN ASPART 3 UNITS: 100 INJECTION, SOLUTION INTRAVENOUS; SUBCUTANEOUS at 05:06

## 2022-06-24 RX ADMIN — Medication 10 ML: at 05:06

## 2022-06-24 RX ADMIN — Medication 1 CAPSULE: at 08:06

## 2022-06-24 RX ADMIN — INSULIN ASPART 15 UNITS: 100 INJECTION, SOLUTION INTRAVENOUS; SUBCUTANEOUS at 12:06

## 2022-06-24 RX ADMIN — INSULIN ASPART 15 UNITS: 100 INJECTION, SOLUTION INTRAVENOUS; SUBCUTANEOUS at 05:06

## 2022-06-24 RX ADMIN — HYDRALAZINE HYDROCHLORIDE 50 MG: 50 TABLET ORAL at 08:06

## 2022-06-24 RX ADMIN — INSULIN ASPART 3 UNITS: 100 INJECTION, SOLUTION INTRAVENOUS; SUBCUTANEOUS at 12:06

## 2022-06-24 RX ADMIN — TAMSULOSIN HYDROCHLORIDE 0.4 MG: 0.4 CAPSULE ORAL at 08:06

## 2022-06-24 RX ADMIN — INSULIN ASPART 15 UNITS: 100 INJECTION, SOLUTION INTRAVENOUS; SUBCUTANEOUS at 07:06

## 2022-06-24 RX ADMIN — METRONIDAZOLE 500 MG: 500 TABLET ORAL at 09:06

## 2022-06-24 RX ADMIN — INSULIN DETEMIR 20 UNITS: 100 INJECTION, SOLUTION SUBCUTANEOUS at 08:06

## 2022-06-24 RX ADMIN — Medication 10 ML: at 12:06

## 2022-06-24 RX ADMIN — HYDRALAZINE HYDROCHLORIDE 50 MG: 50 TABLET ORAL at 12:06

## 2022-06-24 RX ADMIN — INSULIN ASPART 2 UNITS: 100 INJECTION, SOLUTION INTRAVENOUS; SUBCUTANEOUS at 07:06

## 2022-06-24 RX ADMIN — OXYCODONE HYDROCHLORIDE AND ACETAMINOPHEN 1 TABLET: 5; 325 TABLET ORAL at 03:06

## 2022-06-24 RX ADMIN — METRONIDAZOLE 500 MG: 500 TABLET ORAL at 02:06

## 2022-06-24 RX ADMIN — METRONIDAZOLE 500 MG: 500 TABLET ORAL at 06:06

## 2022-06-24 RX ADMIN — ATORVASTATIN CALCIUM 80 MG: 20 TABLET, FILM COATED ORAL at 08:06

## 2022-06-24 RX ADMIN — Medication 10 ML: at 11:06

## 2022-06-24 RX ADMIN — GABAPENTIN 200 MG: 100 CAPSULE ORAL at 08:06

## 2022-06-24 RX ADMIN — ASPIRIN 81 MG: 81 TABLET, COATED ORAL at 08:06

## 2022-06-24 RX ADMIN — INSULIN DETEMIR 20 UNITS: 100 INJECTION, SOLUTION SUBCUTANEOUS at 09:06

## 2022-06-24 RX ADMIN — GABAPENTIN 200 MG: 100 CAPSULE ORAL at 09:06

## 2022-06-24 RX ADMIN — MUPIROCIN: 20 OINTMENT TOPICAL at 11:06

## 2022-06-24 RX ADMIN — DAPTOMYCIN 995 MG: 350 INJECTION, POWDER, LYOPHILIZED, FOR SOLUTION INTRAVENOUS at 05:06

## 2022-06-24 RX ADMIN — NIFEDIPINE 90 MG: 30 TABLET, FILM COATED, EXTENDED RELEASE ORAL at 08:06

## 2022-06-24 NOTE — ASSESSMENT & PLAN NOTE
-PVD contributing to current non-healing foot wound. Arterial US shows LLE atherosclerotic disease, areas of high-grade stenosis within the L popliteal artery  -Continue home statin, ASA  -Vascular Surgery consulted - patient considering surgery (BKA); seems reluctant primarily due to the time he expects it to take to obtain a prosthetic limb. Wants to try conservative management with antibiotics.  -Plan for SNF for IV antibiotics

## 2022-06-24 NOTE — CONSULTS
Double lumen PICC to right basilic vein.  40 cm in length, 38 cm arm circumference and 0 cm exposed.   Lot # [id number].

## 2022-06-24 NOTE — ASSESSMENT & PLAN NOTE
-Bradycardic in ED but asymptomatic, chronic issue  -Repeat EKG 6/19:  BPM 44 Normal sinus rhythm - AV block, 2:1 - Nonspecific T wave abnormality   -avoid BB and chronotropic CCB

## 2022-06-24 NOTE — PLAN OF CARE
Problem: Adult Inpatient Plan of Care  Goal: Plan of Care Review  Outcome: Ongoing, Progressing  Goal: Patient-Specific Goal (Individualized)  Outcome: Ongoing, Progressing  Goal: Absence of Hospital-Acquired Illness or Injury  Outcome: Ongoing, Progressing  Goal: Optimal Comfort and Wellbeing  Outcome: Ongoing, Progressing  POC reviewed with pt. A&Ox4. Room air. PRN medication administered for hypertension. BG monitored. PRN insulin given. Safety checks performed. Bed in lowest position. Wheels locked. Call light in reach. Will continue to monitor.

## 2022-06-24 NOTE — PLAN OF CARE
06/24/22 0836   Post-Acute Status   Post-Acute Authorization Placement   Post-Acute Placement Status Pending payor review/awaiting authorization (if required)   Spoke with Yulissa ([phone]) in admissions at [facility] who reports family is unable to complete paperwork, but she can complete paperwork with patient at bedside.    Met with patient at bedside and updated on acceptance to [facility] and completing paperwork. Patient is agreeable to [facility] and completing the paperwork.    Updated Yulissa who reports she will update SW after the SNF's morning meeting regarding when she is able to do paperwork with the patient. Yulissa reports insurance auth is still pending.    10:03 AM  Spoke with with Yulissa who reports she will be on her way to visit with patient in about 10 minutes. Updated team.    10:47 AM  Spoke with Yulissa who has arrived to complete paperwork with patient.    12:12 PM  142/PASRR sent in careport to [facility]. Yulissa left voicemail requesting vaccination status. Per MD and RN, patient is vaccinated and the records are in links. [facility] is unable to access the vaccine record. Updated MD.    1:01 PM  Sent requested documents to [facility]. Still need copy of covid vaccine record.    2:43 PM  Received voicemail from Yulissa at [facility] who reports she is waiting on insurance auth. Escalated auth to leadership.    Obtained vaccine status records from [facility]. Attempted to contact Yulissa at [facility] and left voicemail requesting return call.    2:48 PM  Spoke with Yulissa at [facility] who reports [employer] requested additional/updated clinicals to be faxed to them for review. [facility] provided Yulissa with patient's vaccine record information.    3:40 PM  Sent SNF orders to [facility].    Toshia Dennis, [facility]  [facility]  Ext. [phone]

## 2022-06-24 NOTE — PLAN OF CARE
.     NURSING HOME ORDERS    06/30/2022  [facility]  GLADIS SHEEHAN - TELEMETRY STEPDOWN  [address]  [facility] [address]  Dept: [phone]  Loc: [phone]     Admit to Nursing Home:  Skilled Nursing Facility    Diagnoses:  Active Hospital Problems    Diagnosis  POA    *Osteomyelitis of left lower extremity [M86.9]  Yes    Acute deep vein thrombosis (DVT) of right upper extremity [I82.621]  Unknown    Acute renal insufficiency [N28.9]  No    Foot pain, left [M79.672]  Yes    Peripheral arterial disease [I73.9]  Yes     Chronic    Asymptomatic Mobitz (type) I (Wenckebach's) atrioventricular block [I44.1]  Yes    Chronic kidney disease, stage III (moderate) [N18.30]  Yes    Type 2 diabetes, uncontrolled, with neuropathy [E11.40, E11.65]  Yes     Chronic    Essential hypertension [I10]  Yes     Chronic      Resolved Hospital Problems   No resolved problems to display.       Patient is homebound due to:  Osteomyelitis of left lower extremity    Allergies:Review of patient's allergies indicates:  No Known Allergies    Vitals:  Every shift    Diet: diabetic diet: 2000 calorie     Activities:    Up in a chair each morning as tolerated, Activity as tolerated     Weight bearing status: LLE partial weight bearing (through heel for transfers only)     Goals of Care Treatment Preferences:  Code Status: Full Code    Labs:  Per facility protocol       Labs to be drawn on Mondays:   · CBC  · CMP   · ESR   · CRP  · CPK (when on Daptomycin)   Fax Lab Results to Infectious Diseases Provider: Patel WORLEY MPH  --  [facility] Fax Number: [phone]    Nursing:  Fall and Pressure ulcer prevention   -place pressure relieving boots to feet when in bed   - arrange for transport to follow up appointments:     Follow-up Information     Mart Escalante MD. Schedule an appointment as soon as possible for a visit in 3 week(s).    Specialty: Family Medicine  Why: For discharge from  hospital follow up  Contact information:  [address] [place] [address]  [phone]             JOHNNY Goel. Schedule an appointment as soon as possible for a visit in 2 week(s).    Specialty: Infectious Diseases  Why: For discharge from hospital follow up, As previously planned  Contact information:  [address]  [place] [address]  [phone]             Mayra Schroeder DPM. Go in 4 week(s).    Specialty: Podiatry  Why: As Scheduled, For discharge from hospital follow up, Wound check  Contact information:  [address]  [place] [address]  [phone]                       Future Appointments   Date Time Provider Department Center   7/13/2022  1:00 PM DAYNA Teixeira POD [facility]   7/18/2022  1:00 PM Anuja Farrell DO [facility] WOUND [facility]   7/20/2022  1:00 PM DAYNA Teixeira   7/27/2022  1:00 PM DAYNA Teixeira POD [facility]       Consults:   PT to evaluate and treat- 5 times a week, OT to evaluate and treat- 5 times a week, Wound Care and Nutrition to evaluate and recommend diet     Miscellaneous Care:     Midline Care:  Scrub the Hub: Prior to accessing the line, always perform a 30 second alcohol scrub  Each lumen of the midline is to be flushed at least daily with 10 mL Normal Saline and 3 mL Heparin flush (10 units/mL)  Routine MidLine Maintenance:   - Sterile dressing changes are done weekly and as needed.   - Use chlor-hexadine scrub to cleanse site, apply Biopatch to insertion site,       apply securement device dressing   - Injection caps are changed weekly and after EVERY lab draw.   - If sterile gauze is under dressing to control oozing,                 dressing change must be performed every 24 hours until gauze is not needed.    Wound Care: Vascular Wound:  Dressing changes 3x/week: cleanse left foot wound and left posterior leg wound with saline, apply hydrofera blue ready to both,  wrap foot and lower leg with cast padding and coban.            Diabetes Care:  SN to perform and educate Diabetic management with blood glucose monitoring:   Fingerstick blood sugar AC and HS and Report CBG < 70 or > 280 to physician.    Medications: Discontinue all previous medication orders, if any. See new list below.    · Daptomycin 8mg/kg IV q24 h  -  Estimated end date of IV antibiotics: 7/28/22          Medication List      START taking these medications    apixaban 5 mg Tab  Commonly known as: ELIQUIS  Take 2 tablets (10 mg total) by mouth 2 (two) times daily for 4 days, THEN 1 tablet (5 mg total) 2 (two) times daily.  Start taking on: July 8, 2022     hydrALAZINE 25 MG tablet  Commonly known as: APRESOLINE  Take 1 tablet (25 mg total) by mouth 3 (three) times daily.     * insulin aspart U-100 100 unit/mL (3 mL) Inpn pen  Commonly known as: NovoLOG  Inject 0-5 Units into the skin before meals and at bedtime as needed (Hyperglycemia). **LOW CORRECTION DOSE**  Blood Glucose  mg/dL                  Pre-meal                2200  151-200                0 unit                      0 unit  201-250                2 units                    1 unit  251-300                3 units                    1 unit  301-350                4 units                    2 units  >350                     5 units                    3 units  Administer subcutaneously if needed at times designated by monitoring schedule.     * insulin aspart U-100 100 unit/mL (3 mL) Inpn pen  Commonly known as: NovoLOG  Inject 20 Units into the skin 3 (three) times daily with meals.     isosorbide dinitrate 10 MG tablet  Commonly known as: ISORDIL  Take 1 tablet (10 mg total) by mouth 3 (three) times daily.     Lactobacillus rhamnosus GG 10 billion cell capsule  Commonly known as: CULTURELLE  Take 1 capsule by mouth 2 (two) times daily.     NIFEdipine 90 MG (OSM) 24 hr tablet  Commonly known as: PROCARDIA-XL  Take 1 tablet (90 mg total) by mouth once  "daily.     sodium chloride 0.9% SolP 50 mL with DAPTOmycin 500 mg SolR 994.5 mg  Inject 994.5 mg into the vein once daily.         * This list has 2 medication(s) that are the same as other medications prescribed for you. Read the directions carefully, and ask your doctor or other care provider to review them with you.            CHANGE how you take these medications    gabapentin 100 MG capsule  Commonly known as: NEURONTIN  Take 2 capsules (200 mg total) by mouth 2 (two) times daily.  What changed:   · how much to take  · when to take this     LANTUS SOLOSTAR U-100 INSULIN glargine 100 units/mL SubQ pen  Generic drug: insulin  Inject 45 Units into the skin every evening.  What changed: how much to take        CONTINUE taking these medications    acetaminophen 500 MG tablet  Commonly known as: TYLENOL  Take 1,000 mg by mouth every 8 (eight) hours as needed for Pain.     aspirin 81 MG EC tablet  Commonly known as: ECOTRIN  Take 81 mg by mouth once daily.     BD ULTRA-FINE ADITYA PEN NEEDLE 32 gauge x 5/32" Ndle  Generic drug: pen needle, diabetic  USE FOUR TIMES DAILY WITH MEALS AND NIGHTLY     blood sugar diagnostic Strp  Commonly known as: CONTOUR TEST STRIPS  Inject 1 strip into the skin 2 (two) times daily before meals.     MICROLET LANCET Misc  Generic drug: lancets     rosuvastatin 40 MG Tab  Commonly known as: CRESTOR  Take 40 mg by mouth once daily.     tamsulosin 0.4 mg Cap  Commonly known as: FLOMAX  Take 1 capsule (0.4 mg total) by mouth once daily.     VITAMIN C 500 MG tablet  Generic drug: ascorbic acid (vitamin C)  Take 500 mg by mouth once daily.        STOP taking these medications    glipiZIDE 10 MG tablet  Commonly known as: GLUCOTROL     hydroCHLOROthiazide 25 MG tablet  Commonly known as: HYDRODIURIL     metFORMIN 500 MG ER 24hr tablet  Commonly known as: GLUCOPHAGE-XR              Salma Strauss MD  07/08/2022        "

## 2022-06-24 NOTE — SUBJECTIVE & OBJECTIVE
Telemedicine  This service was provided by Virtual Visit.    Patient was transferred to [facility] on:  6/18/2022    Chief Complaint   Patient presents with    Wound Check     Sent from podiatry and stated in pain. L toes amputated     The patient location is: 8092/8092 A   Admitted 6/15/2022  2:10 PM  Present with the patient at the time of the telemed/virtual assessment: Telepresenter    Interval History / Events Overnight:   The patient is able to provide adequate history. Additional history was obtained from past medical records. No significant events reported by Nursing. BP remains elevated but improved.  Patient complains of nothing specific. Symptoms have been unchanged since yesterday. Associated symptoms include: fatigue. Symptoms are stable.     Lab test(s) reviewed: hyperglycemia, sCr stable.    Review of Systems   Constitutional:  Negative for fever.   Respiratory:  Negative for shortness of breath.      Objective:     Vital Signs (Most Recent):  Temp: 98 °F (36.7 °C) (06/24/22 0748)  Pulse: (!) 45 (06/24/22 0748)  Resp: 16 (06/24/22 0748)  BP: (!) 168/72 (06/24/22 0748)  SpO2: (!) 94 % (06/24/22 0748)   Vital Signs (24h Range):  Temp:  [96.1 °F (35.6 °C)-98.5 °F (36.9 °C)] 98 °F (36.7 °C)  Pulse:  [45-79] 45  Resp:  [14-18] 16  SpO2:  [93 %-95 %] 94 %  BP: (144-175)/(65-76) 168/72     Weight: 124.3 kg (274 lb 0.5 oz)  Body mass index is 40.47 kg/m².    Intake/Output Summary (Last 24 hours) at 6/24/2022 1113  Last data filed at 6/24/2022 0955  Gross per 24 hour   Intake 200 ml   Output 400 ml   Net -200 ml        Physical Exam  Constitutional:       General: He is not in acute distress.     Appearance: Normal appearance.   Eyes:      General: Lids are normal. No scleral icterus.        Right eye: No discharge.         Left eye: No discharge.      Conjunctiva/sclera: Conjunctivae normal.   Neck:      Trachea: Phonation normal.   Cardiovascular:      Rate and Rhythm: Bradycardia present.       Comments: Monitor / Vital signs reviewed at time of visit  Pulmonary:      Effort: Pulmonary effort is normal. No tachypnea, accessory muscle usage or respiratory distress.   Abdominal:      General: There is no distension.   Musculoskeletal:      Left foot: Deformity (s/p partial amputation) present.   Skin:     Coloration: Skin is not cyanotic.   Neurological:      Mental Status: He is alert. He is not disoriented.   Psychiatric:         Attention and Perception: Attention normal.         Behavior: Behavior is cooperative.       Significant Labs:   Recent Labs   Lab 06/16/22  1100   HGBA1C 9.5*       Recent Labs   Lab 06/23/22  1741 06/23/22  2210 06/24/22  0749   POCTGLUCOSE 236* 214* 224*       Recent Labs   Lab 06/20/22  0425 06/22/22  0416 06/24/22  0545   WBC 6.22 6.33 7.24   HGB 10.4* 10.2* 10.1*   HCT 33.4* 33.0* 32.3*    320 333       Recent Labs   Lab 06/20/22 0425 06/22/22  0416 06/24/22  0545   GRAN 63.1  3.9 61.6  3.9 64.1  4.6   LYMPH 16.6*  1.0 19.9  1.3 19.3  1.4   MONO 11.7  0.7 9.6  0.6 9.0  0.7   EOS 0.5 0.5 0.5       Recent Labs   Lab 06/22/22  0416 06/23/22  0951 06/24/22  0545    135* 133*   K 4.7 4.2 4.4    99 100   CO2 30* 28 24   BUN 26* 27* 24*   CREATININE 1.7* 1.6* 1.5*   * 295* 229*   CALCIUM 9.0 9.1 8.9   ALBUMIN 2.6* 2.6* 2.6*   PHOS  --  3.2  --        Recent Labs   Lab 06/20/22 0425 06/22/22  0416 06/24/22  0545   ALKPHOS 69 76 71   ALT 9* 10 10   AST 14 15 18   PROT 7.0 6.7 6.5   BILITOT 0.3 0.2 0.2       Recent Labs   Lab 02/23/22  1407 06/15/22  1633   LACTATE  --  1.4   SEDRATE >120* >120*       SARS-CoV2 (COVID-19) Qualitative PCR (no units)   Date Value   03/29/2021 Not Detected   03/15/2021 Not Detected   03/02/2021 Not Detected   02/22/2021 Not Detected   01/29/2021 Not Detected     SARS-CoV-2 RNA, Amplification, Qual (no units)   Date Value   02/03/2021 Negative     POC Rapid COVID (no units)   Date Value   06/15/2022 Negative    02/18/2021 Negative   12/08/2020 Negative     CTA Runoff ABD PEL Bilat Lower Ext  Narrative: EXAMINATION:  CTA RUNOFF ABD PEL BILAT LOWER EXT    CLINICAL HISTORY:  Claudication or leg ischemia;Peripheral arterial disease (PAD), asymptomatic;    TECHNIQUE:  CT angiogram of the abdomen/pelvis with lower extremity runoff using xyz mL of  Omnipaque 350 IV contrast.  Multiplanar reconstructions performed.    COMPARISON:  CT abdomen pelvis 02/03/2012    FINDINGS:  Upper extremities within the gantry resulting in beam hardening artifact limiting assessment.    Abdominopelvic Vasculature:    Visualized aorta demonstrates mild calcific atherosclerosis with no convincing evidence of dissection, aneurysm, high-grade stenosis.    Renal, superior mesenteric, and celiac arteries are patent.  GEORGIE is not well assessed due to artifact and poor opacification however appears patent with possible mild to moderate narrowing at the origin.    Common and external iliac arteries demonstrate no hemodynamically significant stenosis.  Bilateral internal iliac arteries demonstrate calcific and noncalcific atherosclerosis likely resulting in areas of moderate narrowing not well assessed due to artifact and poor opacification.    Right lower extremity:    Right common femoral and proximal superficial femoral arteries demonstrate no hemodynamically significant stenosis.  Right DFA with probable multifocal high-grade narrowing/occlusions versus poor opacification.  Moderate atherosclerotic disease of the mid to distal SFA with suspected areas of moderate to high-grade narrowing difficult to assess due to poor opacification.    Right popliteal artery with suspected multifocal moderate to high-grade narrowing limited assessment due to poor opacification.    Moderate narrowing at the trifurcation.    Suspected moderate narrowing at the peroneal origin with severe atherosclerotic disease and probable occlusions distally.    Severe narrowing at the  origin of the posterior tibial artery with multifocal severe atherosclerotic disease distally limiting assessment.    Suspected high-grade narrowing at the origin of the anterior tibial artery, otherwise patent.    Left lower extremity:    Left common femoral and superficial femoral arteries are likely patent.  Left DFA with suspected multifocal high-grade narrowing/occlusions versus poor opacification.    Suspected multifocal occlusions of the distal left popliteal artery.  Trifurcation with high-grade narrowing.    Severe atherosclerotic disease of the posterior tibial artery and suspected occlusion of the origin.    Multifocal occlusions or high-grade narrowing of the peroneal artery.    Anterior tibial artery with areas of patency otherwise there is severe atherosclerotic disease with multifocal high-grade narrowing and possible short-segment occlusions.    Bilateral lower extremities demonstrate diffuse subcutaneous edema and left-sided asymmetric muscular atrophy compared to the right.  Advanced degenerative changes of the knees.    Inferior Mediastinum/Heart: Normal.    Lung Bases: No nodules or consolidation.    Peritoneal Space: No ascites. No free air.    Liver: No focal mass.    Gallbladder: Tiny suspected gas foci within the gallbladder neck lumen which may suggest stones containing gas.    Bile Ducts: No evidence of dilated ducts.    Pancreas: No mass or peripancreatic fat stranding.    Spleen: Normal.    Adrenals: Unremarkable.    Bowel/Mesentery: No evidence of bowel obstruction or inflammation.  Diverticulosis coli.    Urinary Tract: No evidence of obstructive uropathy. Kidneys enhance symmetrically.  Left renal 1.8 cm cyst.    Retroperitoneum:  No significant adenopathy.    Pelvis:  Prostatomegaly.    Thoracic/Abdominal wall:  Gynecomastia bilaterally.    Bones: Moderate degenerative changes of the spine with multilevel mild endplate compression deformities of the lumbar spine  unchanged.  Impression: Poor contrast opacification of the vasculature limits assessment.    Severe atherosclerotic disease of the vasculature.  Multifocal high-grade narrowing or occlusions of the calf vessels.  Vascular narrowing/occlusions may be overestimated due to poor opacification.    Electronically signed by resident: Jaylan Griffin  Date:    06/16/2022  Time:    15:08    Electronically signed by: Eric Carlos MD  Date:    06/16/2022  Time:    16:18  VAS Toe Brachial Indices Resting  Indication  ========    Peripheral Arterial Disease    Pressure Lower  ============    Rt brachial A syst BP  178 mmHg  Rt PTA BP  255 mmHg  Rt dors pedis A  mmHg  Rt toe BP  130 mmHg  Right MARIO ratio use:   post. tibial  Rt MARIO post tibial (rt post tib A BP / max brach A BP) 1.43  Rt MARIO (rt dors ped A BP / max brach A BP) 1.43  Rt ankle BP / max brach A BP   1.43  Rt TBI (rt toe BP / max brach A BP)    0.73  Lt PTA BP  255 mmHg  Left MARIO ratio use:    post. tibial  Lt MARIO (lt post tibial A BP / max brach A BP)  1.43  Lt ankle BP / max brach A BP   1.43    PPG Lower  =========    Rt toe BP - PPG    130 mmHg  Rt toe digit waveform: severely dampened    Impression  =========    Right Leg: Segmental pressures are unreliable due to medial calcinosis; however, PVR waveforms suggest mild peripheral arterial  occlusive disease.  Left Leg: Segmental pressures are unreliable due to medial calcinosis; however, PVR waveforms suggest mild peripheral arterial  occlusive disease. Absent DPA signal.    -Bilateral Toe PPG's and Pressures were performed.  Rt lower digits: Toe PPG waveforms as described above. - TBI of 0.73 with a Great toe pressure of 130 mmHg mmHg is noted.  Lt lower digits: History of foot amputation.    DATE OF SERVICE: 06/16/2022                                                      Sonographer: Leatha Devi RDMS, RVT  Electronically Signed by: ADAM Jauregui M.D. at 06/16/2022-10:53      Labs and Imaging within the  last 24 hours listed above were reviewed.       Diet: Diet diabetic [facility]; 2000 Calorie; Cardiac (Low Na/Chol)  Significant LDAs:   IV Access Type: Peripheral  Urinary Catheter Indication if present: Patient Does Not Have Urinary Catheter  Other Lines/Tubes/Drains:    HIGH RISK CONDITION(S):   Patient has a condition that poses threat to life and bodily function: limb ischemia likely to prevent wound healing      Goals of Care:    Previous admission:  2/17/21  Likely prognosis:  Fair  Code Status: Full Code  Comfort Only: No  Hospice: No  Goals at discharge: remain at home, with physician follow-up    Discharge Planning:  OXANA: 6/24/2022     Code Status: Full Code   Is the patient medically ready for discharge?: Yes    Reason for patient still in hospital (select all that apply): Patient trending condition, Treatment, and Pending disposition  Discharge Plan A: Skilled Nursing Facility

## 2022-06-24 NOTE — ASSESSMENT & PLAN NOTE
-Last A1c 2020, repeat > 9%. Per patient, taking 68 units Lantus QHS along with metformin  mg and glipizide 10 mg daily (Possibly, he is not sure)  -Per Pharm med history: patient is no longer taking insulin  -Mildly hypoglycemic on admit with BG 63  -decreased dose of basal insulin to 15 units Levemir, Titrate up as needed. Added prandial insulin 6/19  -worsening hyperglycemia as oral agents wash out. Levemir increased to BID and aspart increased with meals.  - doses adjusted 6/21; increased for 6/23, and again for 6/24

## 2022-06-24 NOTE — ASSESSMENT & PLAN NOTE
Assessment: IDSA moderate to severe diabetic foot infection with left calcaneal osteomyelitis and overlying wound, possible significant PAD. Wound culture + for GBS and Staph aureus, bone culture pending. L arterial US + for significant stenoses, L MARIO 1.43.     Plan:  -No surgical intervention planned by podiatry. Other services have recommended major amputation, patient not amenable at this time. Attempting limb salvage with wound care and antibiotics.   -Antibiotic plan per ID.  -Appreciate vascular surgery recs: patient will eventually need major amputation.   -Heel protector boot LLE at all times while in bed or chair.   -WB to LLE for transfers only.   -Dressing changes by nursing.   -Podiatry will follow.   DC Instructions: Please order ambulatory referral to podiatry (Kostas) for followup within 2 weeks of discharge. WB LLE for transfers only. Dressing changes 3x/week: cleanse left foot wound with saline, apply hydrofera blue ready, wrap with cast padding and coban.

## 2022-06-24 NOTE — ASSESSMENT & PLAN NOTE
Continued losartan 100mg and HCTZ 25 mg, with hydralazine 25 mg PO q8h PRN  Added nifedipine 6/18  Consulted PharmD for med history: patient reports no longer taking isosorbide-hydralazine, losartan, and losartan-HCTZ  Discontinued ARB, held HCTZ due to increasing sCr. Continued nifedipine for BP control.

## 2022-06-24 NOTE — ASSESSMENT & PLAN NOTE
Per med history, patient no longer taking ARB.  sCr up slightly and vanco level > 20.  Discontinued ARB, holding diuretic for now. Trial of gentle hydration ineffective.  Discontinued vancomycin.  sCr improved slightly 6/23

## 2022-06-24 NOTE — PROGRESS NOTES
[facility] - Telemetry [facility]  Telemedicine Progress Note    Patient Name: Saji Castañeda  MRN: [id number]  Patient Class: IP- Inpatient   Admission Date: 6/15/2022  Length of Stay: 8 days  Attending Physician: Salma Strauss MD  Primary Care Provider: Mart Escalante MD      Subjective:     Principal Problem:Osteomyelitis of left lower extremity    HPI:  71 y.o. M with PMHx DM2 and PVD with chronic diabetic foot wounds s/p L sided BKA, HTN, asymptomatic bradycardia, CKD 3 and HLD who presented to the ED for worsening R foot pain and drainage. Pt. With known chronic ulceration/osteomylitis to L heel and ID has recommended BKA for definitive therapy, but the patient reports that he has not been interested in amputation. Today, he had his usual f/u appointment with ID, and the patient was referred to the ED for imaging with bone biopsy and potential abx because of worsening drainage and pain. Pt. Denies any current fevers, chills, nausea, or other systemic signs of infection.    Overview/Hospital Course:  Patient admitted to hospital medicine. Podiatry and vascular surgery consulted. Patient underwent bone biopsy and culture. Infectious disease consulted.       Telemedicine  This service was provided by Virtual Visit.    Patient was transferred to [facility] on:  6/18/2022    Chief Complaint   Patient presents with    Wound Check     Sent from podiatry and stated in pain. L toes amputated     The patient location is: 8092/8092 A   Admitted 6/15/2022  2:10 PM  Present with the patient at the time of the telemed/virtual assessment: Telepresenter    Interval History / Events Overnight:   The patient is able to provide adequate history. Additional history was obtained from past medical records. No significant events reported by Nursing. and On Call Provider contacted about hypertension  BP remains elevated.  Patient complains of nothing specific. Symptoms have been unchanged since  yesterday. Associated symptoms include: fatigue. Symptoms are stable.     Lab test(s) reviewed: hyperglycemia, sCr stable.    Review of Systems   Constitutional:  Negative for fever.   Respiratory:  Negative for shortness of breath.      Objective:     Vital Signs (Most Recent):  Temp: 98.4 °F (36.9 °C) (06/23/22 1133)  Pulse: (!) 48 (06/23/22 1224)  Resp: 18 (06/23/22 1133)  BP: (!) 169/76 (06/23/22 1133)  SpO2: 95 % (06/23/22 1133)   Vital Signs (24h Range):  Temp:  [98.1 °F (36.7 °C)-98.8 °F (37.1 °C)] 98.4 °F (36.9 °C)  Pulse:  [43-68] 48  Resp:  [18-29] 18  SpO2:  [92 %-96 %] 95 %  BP: (146-201)/(67-82) 169/76     Weight: 124.3 kg (274 lb 0.5 oz)  Body mass index is 40.47 kg/m².    Intake/Output Summary (Last 24 hours) at 6/23/2022 1249  Last data filed at 6/23/2022 0643  Gross per 24 hour   Intake 200 ml   Output 1375 ml   Net -1175 ml        Physical Exam  Constitutional:       General: He is not in acute distress.     Appearance: Normal appearance.   Eyes:      General: Lids are normal. No scleral icterus.        Right eye: No discharge.         Left eye: No discharge.      Conjunctiva/sclera: Conjunctivae normal.   Neck:      Trachea: Phonation normal.   Cardiovascular:      Rate and Rhythm: Bradycardia present.      Comments: Monitor / Vital signs reviewed at time of visit  Pulmonary:      Effort: Pulmonary effort is normal. No tachypnea, accessory muscle usage or respiratory distress.   Abdominal:      General: There is no distension.   Musculoskeletal:      Left foot: Deformity (s/p partial amputation) present.   Skin:     Coloration: Skin is not cyanotic.   Neurological:      Mental Status: He is alert. He is not disoriented.   Psychiatric:         Attention and Perception: Attention normal.         Behavior: Behavior is cooperative.       Significant Labs:   Recent Labs   Lab 06/16/22  1100   HGBA1C 9.5*       Recent Labs   Lab 06/23/22  0724 06/23/22  1131 06/23/22  1235   POCTGLUCOSE 272* 269* 253*        Recent Labs   Lab 06/18/22 0328 06/20/22  0425 06/22/22  0416   WBC 4.86 6.22 6.33   HGB 10.3* 10.4* 10.2*   HCT 32.8* 33.4* 33.0*    342 320       Recent Labs   Lab 06/18/22 0328 06/20/22  0425 06/22/22  0416   GRAN 58.9  2.9 63.1  3.9 61.6  3.9   LYMPH 18.1  0.9* 16.6*  1.0 19.9  1.3   MONO 15.4*  0.8 11.7  0.7 9.6  0.6   EOS 0.3 0.5 0.5       Recent Labs   Lab 06/20/22 0425 06/22/22  0416 06/23/22  0951   * 137 135*   K 4.3 4.7 4.2   CL 98 100 99   CO2 28 30* 28   BUN 20 26* 27*   CREATININE 1.4 1.7* 1.6*   * 256* 295*   CALCIUM 9.3 9.0 9.1   ALBUMIN 2.4* 2.6* 2.6*   PHOS  --   --  3.2       Recent Labs   Lab 06/18/22 0328 06/20/22  0425 06/22/22  0416   ALKPHOS 73 69 76   ALT 8* 9* 10   AST 12 14 15   PROT 7.1 7.0 6.7   BILITOT 0.3 0.3 0.2       Recent Labs   Lab 02/23/22  1407 06/15/22  1633   LACTATE  --  1.4   SEDRATE >120* >120*       SARS-CoV2 (COVID-19) Qualitative PCR (no units)   Date Value   03/29/2021 Not Detected   03/15/2021 Not Detected   03/02/2021 Not Detected   02/22/2021 Not Detected   01/29/2021 Not Detected     SARS-CoV-2 RNA, Amplification, Qual (no units)   Date Value   02/03/2021 Negative     POC Rapid COVID (no units)   Date Value   06/15/2022 Negative   02/18/2021 Negative   12/08/2020 Negative     CTA Runoff ABD PEL Bilat Lower Ext  Narrative: EXAMINATION:  CTA RUNOFF ABD PEL BILAT LOWER EXT    CLINICAL HISTORY:  Claudication or leg ischemia;Peripheral arterial disease (PAD), asymptomatic;    TECHNIQUE:  CT angiogram of the abdomen/pelvis with lower extremity runoff using xyz mL of  Omnipaque 350 IV contrast.  Multiplanar reconstructions performed.    COMPARISON:  CT abdomen pelvis 02/03/2012    FINDINGS:  Upper extremities within the gantry resulting in beam hardening artifact limiting assessment.    Abdominopelvic Vasculature:    Visualized aorta demonstrates mild calcific atherosclerosis with no convincing evidence of dissection, aneurysm,  high-grade stenosis.    Renal, superior mesenteric, and celiac arteries are patent.  GEORGIE is not well assessed due to artifact and poor opacification however appears patent with possible mild to moderate narrowing at the origin.    Common and external iliac arteries demonstrate no hemodynamically significant stenosis.  Bilateral internal iliac arteries demonstrate calcific and noncalcific atherosclerosis likely resulting in areas of moderate narrowing not well assessed due to artifact and poor opacification.    Right lower extremity:    Right common femoral and proximal superficial femoral arteries demonstrate no hemodynamically significant stenosis.  Right DFA with probable multifocal high-grade narrowing/occlusions versus poor opacification.  Moderate atherosclerotic disease of the mid to distal SFA with suspected areas of moderate to high-grade narrowing difficult to assess due to poor opacification.    Right popliteal artery with suspected multifocal moderate to high-grade narrowing limited assessment due to poor opacification.    Moderate narrowing at the trifurcation.    Suspected moderate narrowing at the peroneal origin with severe atherosclerotic disease and probable occlusions distally.    Severe narrowing at the origin of the posterior tibial artery with multifocal severe atherosclerotic disease distally limiting assessment.    Suspected high-grade narrowing at the origin of the anterior tibial artery, otherwise patent.    Left lower extremity:    Left common femoral and superficial femoral arteries are likely patent.  Left DFA with suspected multifocal high-grade narrowing/occlusions versus poor opacification.    Suspected multifocal occlusions of the distal left popliteal artery.  Trifurcation with high-grade narrowing.    Severe atherosclerotic disease of the posterior tibial artery and suspected occlusion of the origin.    Multifocal occlusions or high-grade narrowing of the peroneal  artery.    Anterior tibial artery with areas of patency otherwise there is severe atherosclerotic disease with multifocal high-grade narrowing and possible short-segment occlusions.    Bilateral lower extremities demonstrate diffuse subcutaneous edema and left-sided asymmetric muscular atrophy compared to the right.  Advanced degenerative changes of the knees.    Inferior Mediastinum/Heart: Normal.    Lung Bases: No nodules or consolidation.    Peritoneal Space: No ascites. No free air.    Liver: No focal mass.    Gallbladder: Tiny suspected gas foci within the gallbladder neck lumen which may suggest stones containing gas.    Bile Ducts: No evidence of dilated ducts.    Pancreas: No mass or peripancreatic fat stranding.    Spleen: Normal.    Adrenals: Unremarkable.    Bowel/Mesentery: No evidence of bowel obstruction or inflammation.  Diverticulosis coli.    Urinary Tract: No evidence of obstructive uropathy. Kidneys enhance symmetrically.  Left renal 1.8 cm cyst.    Retroperitoneum:  No significant adenopathy.    Pelvis:  Prostatomegaly.    Thoracic/Abdominal wall:  Gynecomastia bilaterally.    Bones: Moderate degenerative changes of the spine with multilevel mild endplate compression deformities of the lumbar spine unchanged.  Impression: Poor contrast opacification of the vasculature limits assessment.    Severe atherosclerotic disease of the vasculature.  Multifocal high-grade narrowing or occlusions of the calf vessels.  Vascular narrowing/occlusions may be overestimated due to poor opacification.    Electronically signed by resident: Jaylan Griffin  Date:    06/16/2022  Time:    15:08    Electronically signed by: Eric Carlos MD  Date:    06/16/2022  Time:    16:18  VAS Toe Brachial Indices Resting  Indication  ========    Peripheral Arterial Disease    Pressure Lower  ============    Rt brachial A syst BP  178 mmHg  Rt PTA BP  255 mmHg  Rt dors pedis A  mmHg  Rt toe BP  130 mmHg  Right MARIO ratio use:    post. tibial  Rt MARIO post tibial (rt post tib A BP / max brach A BP) 1.43  Rt MARIO (rt dors ped A BP / max brach A BP) 1.43  Rt ankle BP / max brach A BP   1.43  Rt TBI (rt toe BP / max brach A BP)    0.73  Lt PTA BP  255 mmHg  Left MARIO ratio use:    post. tibial  Lt MARIO (lt post tibial A BP / max brach A BP)  1.43  Lt ankle BP / max brach A BP   1.43    PPG Lower  =========    Rt toe BP - PPG    130 mmHg  Rt toe digit waveform: severely dampened    Impression  =========    Right Leg: Segmental pressures are unreliable due to medial calcinosis; however, PVR waveforms suggest mild peripheral arterial  occlusive disease.  Left Leg: Segmental pressures are unreliable due to medial calcinosis; however, PVR waveforms suggest mild peripheral arterial  occlusive disease. Absent DPA signal.    -Bilateral Toe PPG's and Pressures were performed.  Rt lower digits: Toe PPG waveforms as described above. - TBI of 0.73 with a Great toe pressure of 130 mmHg mmHg is noted.  Lt lower digits: History of foot amputation.    DATE OF SERVICE: 06/16/2022                                                      Sonographer: Leatha Devi RDMS, RVT  Electronically Signed by: ADAM Jauregui M.D. at 06/16/2022-10:53      Labs and Imaging within the last 24 hours listed above were reviewed.       Diet: Diet diabetic [facility]; 2000 Calorie; Cardiac (Low Na/Chol)  Significant LDAs:   IV Access Type: Peripheral  Urinary Catheter Indication if present: Patient Does Not Have Urinary Catheter  Other Lines/Tubes/Drains:    HIGH RISK CONDITION(S):   Patient has a condition that poses threat to life and bodily function: limb ischemia likely to prevent wound healing      Goals of Care:    Previous admission:  2/17/21  Likely prognosis:  Fair  Code Status: Full Code  Comfort Only: No  Hospice: No  Goals at discharge: remain at home, with physician follow-up    Discharge Planning:  OXANA: 6/27/2022     Code Status: Full Code   Is the patient medically  ready for discharge?: No    Reason for patient still in hospital (select all that apply): Patient trending condition, Treatment, and Pending disposition  Discharge Plan A: Skilled Nursing Facility       Assessment/Plan:      * Osteomyelitis of left lower extremity  -With acute on chronic osteomyleitis of LLE including heel with purulent foul smelling drainage  -Podiatry consulted, Not septic appearing. S/p bone biopsy.  -ID consulted. Cultures growing Staph aureus and GBS   - L foot wound anaerobic culture growing Prevotella and Porphyromonas Somerae. PO Flagyl added.    - L foot bone culture growing Staph species, continued IV ceftriaxone and vancomycin (per Pharmacy dosing).  - with acute renal insufficiency, vancomycin changed to daptomycin and ceftriaxone discontinued as not needed based on current cultures. Continuing Flagyl    Acute renal insufficiency  Per med history, patient no longer taking ARB.  sCr up slightly and vanco level > 20.  Discontinued ARB, holding diuretic for now. Trial of gentle hydration ineffective.  Discontinued vancomycin.  sCr improved slightly 6/23    Peripheral arterial disease  -PVD contributing to current non-healing foot wound. Arterial US shows LLE atherosclerotic disease, areas of high-grade stenosis within the L popliteal artery  -Continue home statin, ASA  -Vascular Surgery consulted - patient considering surgery (BKA); seems reluctant primarily due to the time he expects it to take to obtain a prosthetic limb. Wants to try conservative management with antibiotics.  -Plan for SNF for IV antibiotics    Asymptomatic Mobitz (type) I (Wenckebach's) atrioventricular block  -Bradycardic in ED but asymptomatic, chronic issue  -Repeat EKG 6/19:  BPM 44 Normal sinus rhythm - AV block, 2:1 - Nonspecific T wave abnormality   -avoid BB and chronotropic CCB    Chronic kidney disease, stage III (moderate)  -sCr baseline 1.1  -Continue to monitor    Type 2 diabetes, uncontrolled, with  neuropathy  -Last A1c 2020, repeat > 9%. Per patient, taking 68 units Lantus QHS along with metformin  mg and glipizide 10 mg daily (Possibly, he is not sure)  -Per Pharm med history: patient is no longer taking insulin  -Mildly hypoglycemic on admit with BG 63  -decreased dose of basal insulin to 15 units Levemir, Titrate up as needed. Added prandial insulin 6/19  -worsening hyperglycemia as oral agents wash out. Levemir increased to BID and aspart increased with meals.  - doses adjusted 6/21; increased for 6/23, and again for 6/24    Essential hypertension  Continued losartan 100mg and HCTZ 25 mg, with hydralazine 25 mg PO q8h PRN  Added nifedipine 6/18  Consulted PharmD for med history: patient reports no longer taking isosorbide-hydralazine, losartan, and losartan-HCTZ  Discontinued ARB, held HCTZ due to increasing sCr. Continued nifedipine for BP control.        Active Hospital Problems    Diagnosis  POA    *Osteomyelitis of left lower extremity [M86.9]  Yes    Acute renal insufficiency [N28.9]  No    Foot pain, left [M79.672]  Yes    Peripheral arterial disease [I73.9]  Yes     Chronic    Asymptomatic Mobitz (type) I (Wenckebach's) atrioventricular block [I44.1]  Yes    Chronic kidney disease, stage III (moderate) [N18.30]  Yes    Type 2 diabetes, uncontrolled, with neuropathy [E11.40, E11.65]  Yes     Chronic    Essential hypertension [I10]  Yes     Chronic      Resolved Hospital Problems   No resolved problems to display.       Inpatient Medications Prescribed for Management of Current Problems:     Scheduled Meds:    aspirin  81 mg Oral Daily    atorvastatin  80 mg Oral Daily    DAPTOmycin (CUBICIN)  IV  8 mg/kg Intravenous Q24H    gabapentin  200 mg Oral BID    [START ON 6/24/2022] insulin aspart U-100  15 Units Subcutaneous TIDWM    insulin detemir U-100  20 Units Subcutaneous BID    Lactobacillus rhamnosus GG  1 capsule Oral BID    metroNIDAZOLE  500 mg Oral Q8H    NIFEdipine  90  mg Oral Daily    tamsulosin  0.4 mg Oral Daily     Continuous Infusions:   As Needed: acetaminophen, albuterol-ipratropium, aluminum-magnesium hydroxide-simethicone, dextrose 10%, dextrose 10%, glucagon (human recombinant), glucose, glucose, hydrALAZINE, insulin aspart U-100, melatonin, naloxone, ondansetron, oxyCODONE-acetaminophen, prochlorperazine, sars-cov-2 (covid-19), sodium chloride 0.9%    VTE Risk Mitigation (From admission, onward)         Ordered     IP VTE HIGH RISK PATIENT  Once         06/15/22 1957     Place sequential compression device  Until discontinued         06/15/22 1957              I have assessed these finding virtually using telemed platform and with assistance of bedside nurse     The attending portion of this evaluation, treatment, and documentation was performed per Salma Strauss MD via Telemedicine AudioVisual using the secure [employer] software platform with 2 way audio/video. The provider was located off-site and the patient is located in the hospital. The aforementioned video software was utilized to document the relevant history and physical exam. Secure [employer] software platform was used instead of [employer] for this visit.      Salma Strauss MD  Department of Hospital Medicine   [facility] - Telemetry Stepdown

## 2022-06-24 NOTE — SUBJECTIVE & OBJECTIVE
Subjective:     Interval History: Bradycardic, hypertensive, other vitals stable. No new pedal complaints. Dressings clean dry, intact.       Scheduled Meds:   aspirin  81 mg Oral Daily    atorvastatin  80 mg Oral Daily    DAPTOmycin (CUBICIN)  IV  8 mg/kg Intravenous Q24H    gabapentin  200 mg Oral BID    insulin aspart U-100  15 Units Subcutaneous TIDWM    insulin detemir U-100  20 Units Subcutaneous BID    Lactobacillus rhamnosus GG  1 capsule Oral BID    metroNIDAZOLE  500 mg Oral Q8H    NIFEdipine  90 mg Oral Daily    sodium chloride 0.9%  10 mL Intravenous Q6H    tamsulosin  0.4 mg Oral Daily     Continuous Infusions:  PRN Meds:acetaminophen, albuterol-ipratropium, aluminum-magnesium hydroxide-simethicone, dextrose 10%, dextrose 10%, glucagon (human recombinant), glucose, glucose, hydrALAZINE, insulin aspart U-100, melatonin, naloxone, ondansetron, oxyCODONE-acetaminophen, prochlorperazine, sars-cov-2 (covid-19), sodium chloride 0.9%, Flushing PICC Protocol **AND** sodium chloride 0.9% **AND** sodium chloride 0.9%    Review of Systems   Constitutional:  Negative for activity change, chills, fever and unexpected weight change.   HENT:  Negative for congestion and sore throat.    Respiratory:  Negative for cough, shortness of breath and wheezing.    Cardiovascular:  Negative for chest pain, palpitations and leg swelling.   Gastrointestinal:  Negative for abdominal pain, blood in stool, nausea and vomiting.   Genitourinary:  Negative for dysuria and hematuria.   Musculoskeletal:  Positive for gait problem. Negative for arthralgias and neck pain.   Skin:  Positive for wound. Negative for color change and rash.   Neurological:  Positive for numbness. Negative for dizziness and seizures.   Psychiatric/Behavioral:  Negative for hallucinations and suicidal ideas.    All other systems reviewed and are negative.  Objective:     Vital Signs (Most Recent):  Temp: 97.9 °F (36.6 °C) (06/24/22 1513)  Pulse: (!) 53  (06/24/22 1513)  Resp: 17 (06/24/22 1513)  BP: (!) 159/71 (06/24/22 1513)  SpO2: (!) 92 % (06/24/22 1513)   Vital Signs (24h Range):  Temp:  [96.1 °F (35.6 °C)-98.5 °F (36.9 °C)] 97.9 °F (36.6 °C)  Pulse:  [45-79] 53  Resp:  [14-18] 17  SpO2:  [92 %-94 %] 92 %  BP: (144-175)/(65-75) 159/71     Weight: 124.3 kg (274 lb 0.5 oz)  Body mass index is 40.47 kg/m².    Foot Exam    General  Orientation: alert and oriented to person, place, and time   Affect: appropriate       Right Foot/Ankle     Inspection and Palpation  Ecchymosis: none  Tenderness: none   Swelling: (BLE edema)  Skin Exam: skin intact;     Neurovascular  Dorsalis pedis: doppler  Posterior tibial: doppler  Saphenous nerve sensation: diminished  Tibial nerve sensation: diminished  Superficial peroneal nerve sensation: diminished  Deep peroneal nerve sensation: diminished  Sural nerve sensation: diminished      Left Foot/Ankle      Inspection and Palpation  Ecchymosis: none  Tenderness: (Tenderness to the posterior heel wound)  Swelling: (significant b/l LE edema)  Skin Exam: drainage (serous), skin changes and ulcer;     Neurovascular  Dorsalis pedis: doppler  Posterior tibial: doppler  Saphenous nerve sensation: diminished  Tibial nerve sensation: diminished  Superficial peroneal nerve sensation: diminished  Deep peroneal nerve sensation: diminished  Sural nerve sensation: diminished    Comments  Chopart amputation    Laboratory:  Blood Cultures: No results for input(s): LABBLOO in the last 48 hours.  CBC:   Recent Labs   Lab 06/24/22  0545   WBC 7.24   RBC 4.00*   HGB 10.1*   HCT 32.3*      MCV 81*   MCH 25.3*   MCHC 31.3*     CMP:   Recent Labs   Lab 06/24/22  0545   *   CALCIUM 8.9   ALBUMIN 2.6*   PROT 6.5   *   K 4.4   CO2 24      BUN 24*   CREATININE 1.5*   ALKPHOS 71   ALT 10   AST 18   BILITOT 0.2     CRP: No results for input(s): CRP in the last 168 hours.  ESR: No results for input(s): SEDRATE in the last 168  hours.  Microbiology Results (last 7 days)       Procedure Component Value Units Date/Time    Aerobic culture [[id number]]  (Abnormal)  (Susceptibility) Collected: 06/16/22 1305    Order Status: Completed Specimen: Wound from Foot, Left Updated: 06/23/22 1036     Aerobic Bacterial Culture STREPTOCOCCUS AGALACTIAE (GROUP B)  Moderate  Beta-hemolytic streptococci are routinely susceptible to   penicillins,cephalosporins and carbapenems.  Susceptibility testing not routinely performed        METHICILLIN RESISTANT STAPHYLOCOCCUS AUREUS  Moderate      Aerobic culture [[id number]]  (Abnormal)  (Susceptibility) Collected: 06/16/22 1308    Order Status: Completed Specimen: Bone from Foot, Left Updated: 06/23/22 1034     Aerobic Bacterial Culture STAPHYLOCOCCUS EPIDERMIDIS  Rare      Blood culture x two cultures. Draw prior to antibiotics. [[id number]] Collected: 06/15/22 1633    Order Status: Completed Specimen: Blood from Peripheral, Antecubital, Right Updated: 06/20/22 1812     Blood Culture, Routine No growth after 5 days.    Narrative:      Aerobic and anaerobic    Blood culture x two cultures. Draw prior to antibiotics. [[id number]] Collected: 06/15/22 1634    Order Status: Completed Specimen: Blood from Peripheral, Antecubital, Left Updated: 06/20/22 1812     Blood Culture, Routine No growth after 5 days.    Narrative:      Aerobic and anaerobic    Culture, Anaerobe [[id number]]  (Abnormal) Collected: 06/16/22 1305    Order Status: Completed Specimen: Wound from Foot, Left Updated: 06/20/22 1209     Anaerobic Culture PRESUMPTIVE PREVOTELLA/PORPHYROMONAS GROUP  Rare        PORPHYROMONAS SOMERAE  Rare      Culture, Anaerobe [[id number]] Collected: 06/16/22 1308    Order Status: Completed Specimen: Bone from Foot, Left Updated: 06/20/22 0736     Anaerobic Culture No anaerobes isolated          Specimen (24h ago, onward)                None              Clinical Findings:  -Left posterior heel wound down to subcutaneous tissue  with thin soft tissue covering overlying calcaneus. Wound bed viable. Serous drainage. Mild tenderness. Periwound maceration improved. Periwound callus.

## 2022-06-24 NOTE — PROCEDURES
"Saji Castañeda is a 72 y.o. male patient.    Temp: 98 °F (36.7 °C) (06/24/22 0748)  Pulse: (!) 45 (06/24/22 0748)  Resp: 16 (06/24/22 0748)  BP: (!) 168/72 (06/24/22 0748)  SpO2: (!) 94 % (06/24/22 0748)  Weight: 124.3 kg (274 lb 0.5 oz) (06/15/22 1347)  Height: 5' 9" (175.3 cm) (06/15/22 1347)    PICC  Date/Time: 6/24/2022 10:46 AM  Performed by: Yolanda Nicholson RN  Assisting provider: Serge Bennett RN  Consent Done: Yes  Time out: Immediately prior to procedure a time out was called to verify the correct patient, procedure, equipment, support staff and site/side marked as required  Indications: med administration and vascular access  Anesthesia: local infiltration  Local anesthetic: lidocaine 1% without epinephrine  Anesthetic Total (mL): 3  Description of findings: PICC  Preparation: skin prepped with ChloraPrep  Skin prep agent dried: skin prep agent completely dried prior to procedure  Sterile barriers: all five maximum sterile barriers used - cap, mask, sterile gown, sterile gloves, and large sterile sheet  Hand hygiene: hand hygiene performed prior to central venous catheter insertion  Location details: right basilic  Catheter type: double lumen  Catheter size: 5 Fr  Catheter Length: 40cm    Ultrasound guidance: yes  Vessel Caliber: medium and patent, compressibility normal  Vascular Doppler: not done  Needle advanced into vessel with real time Ultrasound guidance.  Guidewire confirmed in vessel.  Image recorded and saved.  Sterile sheath used.  Number of attempts: 1  Post-procedure: blood return through all ports, chlorhexidine patch and sterile dressing applied  Technical procedures used: 3CG  Specimens: No  Implants: No  Assessment: placement verified by x-ray  Complications: none          Name   6/24/2022  "

## 2022-06-24 NOTE — PROGRESS NOTES
[facility] - Telemetry Stepdown  Podiatry  Progress Note    Patient Name: Saji Castañeda  MRN: [id number]  Admission Date: 6/15/2022  Hospital Length of Stay: 9 days  Attending Physician: Salma Strauss MD  Primary Care Provider: Mart Escalante MD     Subjective:     Interval History: Bradycardic, hypertensive, other vitals stable. No new pedal complaints. Dressings clean dry, intact.       Scheduled Meds:   aspirin  81 mg Oral Daily    atorvastatin  80 mg Oral Daily    DAPTOmycin (CUBICIN)  IV  8 mg/kg Intravenous Q24H    gabapentin  200 mg Oral BID    insulin aspart U-100  15 Units Subcutaneous TIDWM    insulin detemir U-100  20 Units Subcutaneous BID    Lactobacillus rhamnosus GG  1 capsule Oral BID    metroNIDAZOLE  500 mg Oral Q8H    NIFEdipine  90 mg Oral Daily    sodium chloride 0.9%  10 mL Intravenous Q6H    tamsulosin  0.4 mg Oral Daily     Continuous Infusions:  PRN Meds:acetaminophen, albuterol-ipratropium, aluminum-magnesium hydroxide-simethicone, dextrose 10%, dextrose 10%, glucagon (human recombinant), glucose, glucose, hydrALAZINE, insulin aspart U-100, melatonin, naloxone, ondansetron, oxyCODONE-acetaminophen, prochlorperazine, sars-cov-2 (covid-19), sodium chloride 0.9%, Flushing PICC Protocol **AND** sodium chloride 0.9% **AND** sodium chloride 0.9%    Review of Systems   Constitutional:  Negative for activity change, chills, fever and unexpected weight change.   HENT:  Negative for congestion and sore throat.    Respiratory:  Negative for cough, shortness of breath and wheezing.    Cardiovascular:  Negative for chest pain, palpitations and leg swelling.   Gastrointestinal:  Negative for abdominal pain, blood in stool, nausea and vomiting.   Genitourinary:  Negative for dysuria and hematuria.   Musculoskeletal:  Positive for gait problem. Negative for arthralgias and neck pain.   Skin:  Positive for wound. Negative for color change and rash.   Neurological:  Positive for numbness.  Negative for dizziness and seizures.   Psychiatric/Behavioral:  Negative for hallucinations and suicidal ideas.    All other systems reviewed and are negative.  Objective:     Vital Signs (Most Recent):  Temp: 97.9 °F (36.6 °C) (06/24/22 1513)  Pulse: (!) 53 (06/24/22 1513)  Resp: 17 (06/24/22 1513)  BP: (!) 159/71 (06/24/22 1513)  SpO2: (!) 92 % (06/24/22 1513)   Vital Signs (24h Range):  Temp:  [96.1 °F (35.6 °C)-98.5 °F (36.9 °C)] 97.9 °F (36.6 °C)  Pulse:  [45-79] 53  Resp:  [14-18] 17  SpO2:  [92 %-94 %] 92 %  BP: (144-175)/(65-75) 159/71     Weight: 124.3 kg (274 lb 0.5 oz)  Body mass index is 40.47 kg/m².    Foot Exam    General  Orientation: alert and oriented to person, place, and time   Affect: appropriate       Right Foot/Ankle     Inspection and Palpation  Ecchymosis: none  Tenderness: none   Swelling: (BLE edema)  Skin Exam: skin intact;     Neurovascular  Dorsalis pedis: doppler  Posterior tibial: doppler  Saphenous nerve sensation: diminished  Tibial nerve sensation: diminished  Superficial peroneal nerve sensation: diminished  Deep peroneal nerve sensation: diminished  Sural nerve sensation: diminished      Left Foot/Ankle      Inspection and Palpation  Ecchymosis: none  Tenderness: (Tenderness to the posterior heel wound)  Swelling: (significant b/l LE edema)  Skin Exam: drainage (serous), skin changes and ulcer;     Neurovascular  Dorsalis pedis: doppler  Posterior tibial: doppler  Saphenous nerve sensation: diminished  Tibial nerve sensation: diminished  Superficial peroneal nerve sensation: diminished  Deep peroneal nerve sensation: diminished  Sural nerve sensation: diminished    Comments  Chopart amputation    Laboratory:  Blood Cultures: No results for input(s): LABBLOO in the last 48 hours.  CBC:   Recent Labs   Lab 06/24/22  0545   WBC 7.24   RBC 4.00*   HGB 10.1*   HCT 32.3*      MCV 81*   MCH 25.3*   MCHC 31.3*     CMP:   Recent Labs   Lab 06/24/22  0545   *   CALCIUM 8.9    ALBUMIN 2.6*   PROT 6.5   *   K 4.4   CO2 24      BUN 24*   CREATININE 1.5*   ALKPHOS 71   ALT 10   AST 18   BILITOT 0.2     CRP: No results for input(s): CRP in the last 168 hours.  ESR: No results for input(s): SEDRATE in the last 168 hours.  Microbiology Results (last 7 days)       Procedure Component Value Units Date/Time    Aerobic culture [[id number]]  (Abnormal)  (Susceptibility) Collected: 06/16/22 1305    Order Status: Completed Specimen: Wound from Foot, Left Updated: 06/23/22 1036     Aerobic Bacterial Culture STREPTOCOCCUS AGALACTIAE (GROUP B)  Moderate  Beta-hemolytic streptococci are routinely susceptible to   penicillins,cephalosporins and carbapenems.  Susceptibility testing not routinely performed        METHICILLIN RESISTANT STAPHYLOCOCCUS AUREUS  Moderate      Aerobic culture [[id number]]  (Abnormal)  (Susceptibility) Collected: 06/16/22 1308    Order Status: Completed Specimen: Bone from Foot, Left Updated: 06/23/22 1034     Aerobic Bacterial Culture STAPHYLOCOCCUS EPIDERMIDIS  Rare      Blood culture x two cultures. Draw prior to antibiotics. [[id number]] Collected: 06/15/22 1633    Order Status: Completed Specimen: Blood from Peripheral, Antecubital, Right Updated: 06/20/22 1812     Blood Culture, Routine No growth after 5 days.    Narrative:      Aerobic and anaerobic    Blood culture x two cultures. Draw prior to antibiotics. [[id number]] Collected: 06/15/22 1634    Order Status: Completed Specimen: Blood from Peripheral, Antecubital, Left Updated: 06/20/22 1812     Blood Culture, Routine No growth after 5 days.    Narrative:      Aerobic and anaerobic    Culture, Anaerobe [[id number]]  (Abnormal) Collected: 06/16/22 1305    Order Status: Completed Specimen: Wound from Foot, Left Updated: 06/20/22 1209     Anaerobic Culture PRESUMPTIVE PREVOTELLA/PORPHYROMONAS GROUP  Rare        PORPHYROMONAS SOMERAE  Rare      Culture, Anaerobe [[id number]] Collected: 06/16/22 1308    Order Status:  Completed Specimen: Bone from Foot, Left Updated: 06/20/22 0736     Anaerobic Culture No anaerobes isolated          Specimen (24h ago, onward)                None              Clinical Findings:  -Left posterior heel wound down to subcutaneous tissue with thin soft tissue covering overlying calcaneus. Wound bed viable. Serous drainage. Mild tenderness. Periwound maceration improved. Periwound callus.              Assessment/Plan:     * Osteomyelitis of left lower extremity  Assessment: IDSA moderate to severe diabetic foot infection with left calcaneal osteomyelitis and overlying wound, possible significant PAD. Wound culture + for GBS and Staph aureus, bone culture pending. L arterial US + for significant stenoses, L MARIO 1.43.     Plan:  -No surgical intervention planned by podiatry. Other services have recommended major amputation, patient not amenable at this time. Attempting limb salvage with wound care and antibiotics.   -Antibiotic plan per ID.  -Appreciate vascular surgery recs: patient will eventually need major amputation.   -Heel protector boot LLE at all times while in bed or chair.   -WB to LLE for transfers only.   -Dressing changes by nursing.   -Podiatry will follow.   DC Instructions: Please order ambulatory referral to podiatry ([facility]) for followup within 2 weeks of discharge. WB LLE for transfers only. Dressing changes 3x/week: cleanse left foot wound with saline, apply hydrofera blue ready, wrap with cast padding and coban.     Peripheral arterial disease  Per vascular surgery        Ortiz John DPM PGY-2  Podiatric Medicine & Surgery  [facility]  Secure Chat Preferred  Mobile: [phone]  Pager: [phone]

## 2022-06-25 LAB
POCT GLUCOSE: 149 MG/DL (ref 70–110)
POCT GLUCOSE: 196 MG/DL (ref 70–110)
POCT GLUCOSE: 225 MG/DL (ref 70–110)
POCT GLUCOSE: 228 MG/DL (ref 70–110)

## 2022-06-25 PROCEDURE — 63600175 PHARM REV CODE 636 W HCPCS: Performed by: PHYSICIAN ASSISTANT

## 2022-06-25 PROCEDURE — 25000003 PHARM REV CODE 250: Performed by: REGISTERED NURSE

## 2022-06-25 PROCEDURE — A4216 STERILE WATER/SALINE, 10 ML: HCPCS | Performed by: INTERNAL MEDICINE

## 2022-06-25 PROCEDURE — 25000003 PHARM REV CODE 250: Performed by: PHYSICIAN ASSISTANT

## 2022-06-25 PROCEDURE — 25000003 PHARM REV CODE 250: Performed by: HOSPITALIST

## 2022-06-25 PROCEDURE — 99233 PR SUBSEQUENT HOSPITAL CARE,LEVL III: ICD-10-PCS | Mod: 95,,, | Performed by: INTERNAL MEDICINE

## 2022-06-25 PROCEDURE — 63600175 PHARM REV CODE 636 W HCPCS: Performed by: INTERNAL MEDICINE

## 2022-06-25 PROCEDURE — 27000207 HC ISOLATION

## 2022-06-25 PROCEDURE — 25000003 PHARM REV CODE 250: Performed by: INTERNAL MEDICINE

## 2022-06-25 PROCEDURE — 20600001 HC STEP DOWN PRIVATE ROOM

## 2022-06-25 PROCEDURE — 99233 SBSQ HOSP IP/OBS HIGH 50: CPT | Mod: 95,,, | Performed by: INTERNAL MEDICINE

## 2022-06-25 RX ADMIN — INSULIN ASPART 18 UNITS: 100 INJECTION, SOLUTION INTRAVENOUS; SUBCUTANEOUS at 06:06

## 2022-06-25 RX ADMIN — METRONIDAZOLE 500 MG: 500 TABLET ORAL at 10:06

## 2022-06-25 RX ADMIN — Medication 10 ML: at 04:06

## 2022-06-25 RX ADMIN — Medication 10 ML: at 11:06

## 2022-06-25 RX ADMIN — Medication 10 ML: at 12:06

## 2022-06-25 RX ADMIN — INSULIN ASPART 18 UNITS: 100 INJECTION, SOLUTION INTRAVENOUS; SUBCUTANEOUS at 12:06

## 2022-06-25 RX ADMIN — INSULIN ASPART 2 UNITS: 100 INJECTION, SOLUTION INTRAVENOUS; SUBCUTANEOUS at 12:06

## 2022-06-25 RX ADMIN — ASPIRIN 81 MG: 81 TABLET, COATED ORAL at 08:06

## 2022-06-25 RX ADMIN — TAMSULOSIN HYDROCHLORIDE 0.4 MG: 0.4 CAPSULE ORAL at 08:06

## 2022-06-25 RX ADMIN — DAPTOMYCIN 995 MG: 350 INJECTION, POWDER, LYOPHILIZED, FOR SOLUTION INTRAVENOUS at 06:06

## 2022-06-25 RX ADMIN — Medication 10 ML: at 06:06

## 2022-06-25 RX ADMIN — INSULIN DETEMIR 23 UNITS: 100 INJECTION, SOLUTION SUBCUTANEOUS at 08:06

## 2022-06-25 RX ADMIN — METRONIDAZOLE 500 MG: 500 TABLET ORAL at 03:06

## 2022-06-25 RX ADMIN — Medication 1 CAPSULE: at 10:06

## 2022-06-25 RX ADMIN — Medication 1 CAPSULE: at 08:06

## 2022-06-25 RX ADMIN — MUPIROCIN: 20 OINTMENT TOPICAL at 08:06

## 2022-06-25 RX ADMIN — GABAPENTIN 200 MG: 100 CAPSULE ORAL at 10:06

## 2022-06-25 RX ADMIN — MUPIROCIN: 20 OINTMENT TOPICAL at 10:06

## 2022-06-25 RX ADMIN — INSULIN DETEMIR 23 UNITS: 100 INJECTION, SOLUTION SUBCUTANEOUS at 10:06

## 2022-06-25 RX ADMIN — INSULIN ASPART 18 UNITS: 100 INJECTION, SOLUTION INTRAVENOUS; SUBCUTANEOUS at 08:06

## 2022-06-25 RX ADMIN — NIFEDIPINE 90 MG: 30 TABLET, FILM COATED, EXTENDED RELEASE ORAL at 08:06

## 2022-06-25 RX ADMIN — METRONIDAZOLE 500 MG: 500 TABLET ORAL at 04:06

## 2022-06-25 RX ADMIN — ATORVASTATIN CALCIUM 80 MG: 20 TABLET, FILM COATED ORAL at 08:06

## 2022-06-25 RX ADMIN — GABAPENTIN 200 MG: 100 CAPSULE ORAL at 08:06

## 2022-06-25 NOTE — PROGRESS NOTES
[facility] - Tele[facility]  Telemedicine Progress Note    Patient Name: Saji Castañeda  MRN: [id number]  Patient Class: IP- Inpatient   Admission Date: 6/15/2022  Length of Stay: 10 days  Attending Physician: Salma Strauss MD  Primary Care Provider: Mart Escalante MD    Subjective:     Principal Problem:Osteomyelitis of left lower extremity    HPI:  71 y.o. M with PMHx DM2 and PVD with chronic diabetic foot wounds s/p L sided BKA, HTN, asymptomatic bradycardia, CKD 3 and HLD who presented to the ED for worsening R foot pain and drainage. Pt. With known chronic ulceration/osteomylitis to L heel and ID has recommended BKA for definitive therapy, but the patient reports that he has not been interested in amputation. Today, he had his usual f/u appointment with ID, and the patient was referred to the ED for imaging with bone biopsy and potential abx because of worsening drainage and pain. Pt. Denies any current fevers, chills, nausea, or other systemic signs of infection.      Overview/Hospital Course:  Patient admitted to hospital medicine. Podiatry and vascular surgery consulted. Patient underwent bone biopsy and culture. Infectious disease consulted.       Telemedicine  This service was provided by Virtual Visit.    Patient was transferred to [facility] on:  6/18/2022    Chief Complaint   Patient presents with    Wound Check     Sent from podiatry and stated in pain. L toes amputated     The patient location is: 8092/8092 A   Admitted 6/15/2022  2:10 PM  Present with the patient at the time of the telemed/virtual assessment: Telepresenter    Interval History / Events Overnight:   The patient is able to provide adequate history. Additional history was obtained from past medical records. No significant events reported by Nursing. BP remains elevated  Patient complains of nothing specific. Symptoms have been unchanged since yesterday. Associated symptoms include: fatigue.  Symptoms are stable.     Lab test(s) reviewed: hyperglycemia, sCr stable.    Review of Systems   Constitutional:  Negative for fever.   Respiratory:  Negative for shortness of breath.      Objective:     Vital Signs (Most Recent):  Temp: 98 °F (36.7 °C) (06/25/22 1529)  Pulse: (!) 46 (06/25/22 1529)  Resp: 19 (06/25/22 1529)  BP: (!) 163/72 (06/25/22 1529)  SpO2: (!) 92 % (06/25/22 1529)   Vital Signs (24h Range):  Temp:  [97.8 °F (36.6 °C)-98.6 °F (37 °C)] 97.8 °F (36.6 °C)  Pulse:  [40-60] 40  Resp:  [17-19] 19  SpO2:  [92 %-98 %] 98 %  BP: (154-187)/(70-82) 187/82     Weight: 124.3 kg (274 lb 0.5 oz)  Body mass index is 40.47 kg/m².    Intake/Output Summary (Last 24 hours) at 6/25/2022 1033  Last data filed at 6/25/2022 0800  Gross per 24 hour   Intake 480 ml   Output 700 ml   Net -220 ml        Physical Exam  Constitutional:       General: He is not in acute distress.     Appearance: Normal appearance.   Eyes:      General: Lids are normal. No scleral icterus.        Right eye: No discharge.         Left eye: No discharge.      Conjunctiva/sclera: Conjunctivae normal.   Neck:      Trachea: Phonation normal.   Cardiovascular:      Rate and Rhythm: Bradycardia present.      Comments: Monitor / Vital signs reviewed at time of visit  Pulmonary:      Effort: Pulmonary effort is normal. No tachypnea, accessory muscle usage or respiratory distress.   Abdominal:      General: There is no distension.   Musculoskeletal:      Left foot: Deformity (s/p partial amputation) present.   Skin:     Coloration: Skin is not cyanotic.   Neurological:      Mental Status: He is alert. He is not disoriented.   Psychiatric:         Attention and Perception: Attention normal.         Behavior: Behavior is cooperative.       Significant Labs:   Recent Labs   Lab 06/16/22  1100   HGBA1C 9.5*       Recent Labs   Lab 06/24/22  1514 06/24/22  1729 06/24/22  2126   POCTGLUCOSE 249* 260* 242*       Recent Labs   Lab 06/20/22  0694  06/22/22  0416 06/24/22  0545   WBC 6.22 6.33 7.24   HGB 10.4* 10.2* 10.1*   HCT 33.4* 33.0* 32.3*    320 333       Recent Labs   Lab 06/20/22  0425 06/22/22  0416 06/24/22  0545   GRAN 63.1  3.9 61.6  3.9 64.1  4.6   LYMPH 16.6*  1.0 19.9  1.3 19.3  1.4   MONO 11.7  0.7 9.6  0.6 9.0  0.7   EOS 0.5 0.5 0.5       Recent Labs   Lab 06/22/22  0416 06/23/22  0951 06/24/22  0545    135* 133*   K 4.7 4.2 4.4    99 100   CO2 30* 28 24   BUN 26* 27* 24*   CREATININE 1.7* 1.6* 1.5*   * 295* 229*   CALCIUM 9.0 9.1 8.9   ALBUMIN 2.6* 2.6* 2.6*   PHOS  --  3.2  --        Recent Labs   Lab 06/20/22  0425 06/22/22  0416 06/24/22  0545   ALKPHOS 69 76 71   ALT 9* 10 10   AST 14 15 18   PROT 7.0 6.7 6.5   BILITOT 0.3 0.2 0.2       Recent Labs   Lab 02/23/22  1407 06/15/22  1633   LACTATE  --  1.4   SEDRATE >120* >120*       SARS-CoV2 (COVID-19) Qualitative PCR (no units)   Date Value   03/29/2021 Not Detected   03/15/2021 Not Detected   03/02/2021 Not Detected   02/22/2021 Not Detected   01/29/2021 Not Detected     SARS-CoV-2 RNA, Amplification, Qual (no units)   Date Value   02/03/2021 Negative     POC Rapid COVID (no units)   Date Value   06/15/2022 Negative   02/18/2021 Negative   12/08/2020 Negative     X-Ray Chest 1 View S/P PICC Line by Nursing  Narrative: EXAMINATION:  XR CHEST 1 VIEW S/P PICC LINE BY NURSING    CLINICAL HISTORY:  s/p picc placement;    TECHNIQUE:  Single frontal view of chest.    COMPARISON:  March 3, 2021    FINDINGS:  Right PICC line now evident, tip superimposing lower superior vena caval soft tissues.  Normal heart size.  Normal pulmonary vasculature.  No focal infiltrates.  No pleural fluid or pneumothorax.  No acute bony findings.  Impression: 1. Right PICC line tip superimposes lower superior vena caval soft tissues.  2. No active cardiac or pulmonary findings    Electronically signed by: John Thomas  Date:    06/24/2022  Time:    12:31      Labs and Imaging within  the last 24 hours listed above were reviewed.       Diet: Diet diabetic [facility]; 2000 Calorie; Cardiac (Low Na/Chol)  Diet diabetic  Significant LDAs:   IV Access Type: Peripheral  Urinary Catheter Indication if present: Patient Does Not Have Urinary Catheter  Other Lines/Tubes/Drains:    HIGH RISK CONDITION(S):   Patient has a condition that poses threat to life and bodily function: limb ischemia likely to prevent wound healing      Goals of Care:    Previous admission:  2/17/21  Likely prognosis:  Fair  Code Status: Full Code  Comfort Only: No  Hospice: No  Goals at discharge: remain at home, with physician follow-up    Discharge Planning:  OXANA: 6/27/2022     Code Status: Full Code   Is the patient medically ready for discharge?: Yes    Reason for patient still in hospital (select all that apply): Patient trending condition, Treatment, and Pending disposition  Discharge Plan A: Skilled Nursing Facility       Assessment/Plan:      * Osteomyelitis of left lower extremity  -With acute on chronic osteomyleitis of LLE including heel with purulent foul smelling drainage  -Podiatry consulted, Not septic appearing. S/p bone biopsy.  -ID consulted. Cultures growing Staph aureus and GBS   - L foot wound anaerobic culture growing Prevotella and Porphyromonas Somerae. PO Flagyl added.    - L foot bone culture growing Staph species, continued IV ceftriaxone and vancomycin (per Pharmacy dosing).  - with acute renal insufficiency, vancomycin changed to daptomycin and ceftriaxone discontinued as not needed based on current cultures. Continuing Flagyl  -Continuing daptomycin and Flagyl    Acute renal insufficiency  Per med history, patient no longer taking ARB.  sCr up slightly and vanco level > 20.  Discontinued ARB, holding diuretic for now. Trial of gentle hydration ineffective.  Discontinued vancomycin.  sCr improved     Peripheral arterial disease  -PVD contributing to current non-healing foot wound. Arterial US  shows LLE atherosclerotic disease, areas of high-grade stenosis within the L popliteal artery  -Continue home statin, ASA  -Vascular Surgery consulted - patient considering surgery (BKA); seems reluctant primarily due to the time he expects it to take to obtain a prosthetic limb. Wants to try conservative management with antibiotics.  -Plan for SNF for IV antibiotics  - Follow up with Podiatry, ID and Vascular Surgery    Asymptomatic Mobitz (type) I (Wenckebach's) atrioventricular block  -Bradycardic in ED but asymptomatic, chronic issue  -Repeat EKG 6/19:  BPM 44 Normal sinus rhythm - AV block, 2:1 - Nonspecific T wave abnormality   -avoid BB and chronotropic CCB    Chronic kidney disease, stage III (moderate)  -sCr baseline 1.1  -Continue to monitor  -sCr remains > baseline    Type 2 diabetes, uncontrolled, with neuropathy  -Last A1c 2020, repeat > 9%. Per patient, taking 68 units Lantus QHS along with metformin  mg and glipizide 10 mg daily (Possibly, he is not sure)  -Per Pharm med history: patient is no longer taking insulin  -Mildly hypoglycemic on admit with BG 63  -decreased dose of basal insulin to 15 units Levemir, Titrate up as needed. Added prandial insulin 6/19  -worsening hyperglycemia as oral agents wash out. Levemir increased to BID and aspart increased with meals.  - doses adjusted 6/21; increased for 6/23, and again for 6/25    Essential hypertension  Continued losartan 100mg and HCTZ 25 mg, with hydralazine 25 mg PO q8h PRN  Added nifedipine 6/18  Consulted PharmD for med history: patient reports no longer taking isosorbide-hydralazine, losartan, and losartan-HCTZ  Discontinued ARB, held HCTZ due to increasing sCr. Continued nifedipine for BP control.  Consider resuming diuretic soon        Active Hospital Problems    Diagnosis  POA    *Osteomyelitis of left lower extremity [M86.9]  Yes    Acute renal insufficiency [N28.9]  No    Foot pain, left [M79.672]  Yes    Peripheral arterial  disease [I73.9]  Yes     Chronic    Asymptomatic Mobitz (type) I (Wenckebach's) atrioventricular block [I44.1]  Yes    Chronic kidney disease, stage III (moderate) [N18.30]  Yes    Type 2 diabetes, uncontrolled, with neuropathy [E11.40, E11.65]  Yes     Chronic    Essential hypertension [I10]  Yes     Chronic      Resolved Hospital Problems   No resolved problems to display.       Inpatient Medications Prescribed for Management of Current Problems:     Scheduled Meds:    aspirin  81 mg Oral Daily    atorvastatin  80 mg Oral Daily    DAPTOmycin (CUBICIN)  IV  8 mg/kg Intravenous Q24H    gabapentin  200 mg Oral BID    insulin aspart U-100  18 Units Subcutaneous TIDWM    insulin detemir U-100  23 Units Subcutaneous BID    Lactobacillus rhamnosus GG  1 capsule Oral BID    metroNIDAZOLE  500 mg Oral Q8H    mupirocin   Nasal BID    NIFEdipine  90 mg Oral Daily    sodium chloride 0.9%  10 mL Intravenous Q6H    tamsulosin  0.4 mg Oral Daily     Continuous Infusions:   As Needed: acetaminophen, albuterol-ipratropium, aluminum-magnesium hydroxide-simethicone, dextrose 10%, dextrose 10%, glucagon (human recombinant), glucose, glucose, hydrALAZINE, insulin aspart U-100, melatonin, naloxone, ondansetron, oxyCODONE-acetaminophen, prochlorperazine, sars-cov-2 (covid-19), sodium chloride 0.9%, Flushing PICC Protocol **AND** sodium chloride 0.9% **AND** sodium chloride 0.9%    VTE Risk Mitigation (From admission, onward)         Ordered     IP VTE HIGH RISK PATIENT  Once         06/15/22 1957     Place sequential compression device  Until discontinued         06/15/22 1957              I have assessed these finding virtually using telemed platform and with assistance of bedside nurse     The attending portion of this evaluation, treatment, and documentation was performed per Salma Strauss MD via Telemedicine AudioVisual using the secure [employer] software platform with 2 way audio/video. The provider was located  off-site and the patient is located in the hospital. The aforementioned video software was utilized to document the relevant history and physical exam.     Salma Strauss MD  Department of Hospital Medicine   [facility] - Telemetry Stepdown

## 2022-06-25 NOTE — ASSESSMENT & PLAN NOTE
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 S/p bone biopsy.  -ID consulted. Cultures growing Staph aureus and GBS   - L foot wound anaerobic culture growing Prevotella and Porphyromonas Somerae. PO Flagyl added.    - L foot bone culture growing Staph species, continued IV ceftriaxone and vancomycin (per Pharmacy dosing).  - with acute renal insufficiency, vancomycin changed to daptomycin and ceftriaxone discontinued as not needed based on current cultures. Continuing Flagyl  -Continuing daptomycin and Flagyl

## 2022-06-25 NOTE — ASSESSMENT & PLAN NOTE
-PVD contributing to current non-healing foot wound. Arterial US shows LLE atherosclerotic disease, areas of high-grade stenosis within the L popliteal artery  -Continue home statin, ASA  -Vascular Surgery consulted - patient considering surgery (BKA); seems reluctant primarily due to the time he expects it to take to obtain a prosthetic limb. Wants to try conservative management with antibiotics.  -Plan for SNF for IV antibiotics  - Follow up with Podiatry, ID and Vascular Surgery

## 2022-06-25 NOTE — PLAN OF CARE
Problem: Adult Inpatient Plan of Care  Goal: Plan of Care Review  Outcome: Ongoing, Progressing  Goal: Patient-Specific Goal (Individualized)  Outcome: Ongoing, Progressing  Goal: Absence of Hospital-Acquired Illness or Injury  Outcome: Ongoing, Progressing  Goal: Optimal Comfort and Wellbeing  Outcome: Ongoing, Progressing  Goal: Readiness for Transition of Care  Outcome: Ongoing, Progressing     Problem: Bariatric Environmental Safety  Goal: Safety Maintained with Care  Outcome: Ongoing, Progressing     Problem: Diabetes Comorbidity  Goal: Blood Glucose Level Within Targeted Range  Outcome: Ongoing, Progressing     Problem: Fluid and Electrolyte Imbalance (Acute Kidney Injury/Impairment)  Goal: Fluid and Electrolyte Balance  Outcome: Ongoing, Progressing     Problem: Oral Intake Inadequate (Acute Kidney Injury/Impairment)  Goal: Optimal Nutrition Intake  Outcome: Ongoing, Progressing     Problem: Renal Function Impairment (Acute Kidney Injury/Impairment)  Goal: Effective Renal Function  Outcome: Ongoing, Progressing     Problem: Impaired Wound Healing  Goal: Optimal Wound Healing  Outcome: Ongoing, Progressing     Problem: Skin Injury Risk Increased  Goal: Skin Health and Integrity  Outcome: Ongoing, Progressing     Problem: Infection  Goal: Absence of Infection Signs and Symptoms  Outcome: Ongoing, Progressing       Pt AO x4, VSS on RA; meds given without issue; pt has orders for cont pulse O2, but there is currently no equip available at this time; pt rested throughout shift; no issues to report

## 2022-06-25 NOTE — ASSESSMENT & PLAN NOTE
-Last A1c 2020, repeat > 9%. Per patient, taking 68 units Lantus QHS along with metformin  mg and glipizide 10 mg daily (Possibly, he is not sure)  -Per Pharm med history: patient is no longer taking insulin  -Mildly hypoglycemic on admit with BG 63  -decreased dose of basal insulin to 15 units Levemir, Titrate up as needed. Added prandial insulin 6/19  -worsening hyperglycemia as oral agents wash out. Levemir increased to BID and aspart increased with meals.  - doses adjusted 6/21; increased for 6/23, and again for 6/25

## 2022-06-25 NOTE — ASSESSMENT & PLAN NOTE
Per med history, patient no longer taking ARB.  sCr up slightly and vanco level > 20.  Discontinued ARB, holding diuretic for now. Trial of gentle hydration ineffective.  Discontinued vancomycin.  sCr improved

## 2022-06-25 NOTE — PROGRESS NOTES
[facility] - Telemetry [facility] Medicine  Telemedicine Progress Note    Patient Name: Saji Castañeda  MRN: [id number]  Patient Class: IP- Inpatient   Admission Date: 6/15/2022  Length of Stay: 9 days  Attending Physician: Salma Strauss MD  Primary Care Provider: Mart Escalante MD    Subjective:     Principal Problem:Osteomyelitis of left lower extremity    HPI:  71 y.o. M with PMHx DM2 and PVD with chronic diabetic foot wounds s/p L sided BKA, HTN, asymptomatic bradycardia, CKD 3 and HLD who presented to the ED for worsening R foot pain and drainage. Pt. With known chronic ulceration/osteomylitis to L heel and ID has recommended BKA for definitive therapy, but the patient reports that he has not been interested in amputation. Today, he had his usual f/u appointment with ID, and the patient was referred to the ED for imaging with bone biopsy and potential abx because of worsening drainage and pain. Pt. Denies any current fevers, chills, nausea, or other systemic signs of infection.      Overview/Hospital Course:  Patient admitted to hospital medicine. Podiatry and vascular surgery consulted. Patient underwent bone biopsy and culture. Infectious disease consulted.       Telemedicine  This service was provided by Virtual Visit.    Patient was transferred to [facility] on:  6/18/2022    Chief Complaint   Patient presents with    Wound Check     Sent from podiatry and stated in pain. L toes amputated     The patient location is: 8092/8092 A   Admitted 6/15/2022  2:10 PM  Present with the patient at the time of the telemed/virtual assessment: Telepresenter    Interval History / Events Overnight:   The patient is able to provide adequate history. Additional history was obtained from past medical records. No significant events reported by Nursing. BP remains elevated but improved.  Patient complains of nothing specific. Symptoms have been unchanged since yesterday. Associated symptoms include:  fatigue. Symptoms are stable.     Lab test(s) reviewed: hyperglycemia, sCr stable.    Review of Systems   Constitutional:  Negative for fever.   Respiratory:  Negative for shortness of breath.      Objective:     Vital Signs (Most Recent):  Temp: 98 °F (36.7 °C) (06/24/22 0748)  Pulse: (!) 45 (06/24/22 0748)  Resp: 16 (06/24/22 0748)  BP: (!) 168/72 (06/24/22 0748)  SpO2: (!) 94 % (06/24/22 0748)   Vital Signs (24h Range):  Temp:  [96.1 °F (35.6 °C)-98.5 °F (36.9 °C)] 98 °F (36.7 °C)  Pulse:  [45-79] 45  Resp:  [14-18] 16  SpO2:  [93 %-95 %] 94 %  BP: (144-175)/(65-76) 168/72     Weight: 124.3 kg (274 lb 0.5 oz)  Body mass index is 40.47 kg/m².    Intake/Output Summary (Last 24 hours) at 6/24/2022 1113  Last data filed at 6/24/2022 0955  Gross per 24 hour   Intake 200 ml   Output 400 ml   Net -200 ml        Physical Exam  Constitutional:       General: He is not in acute distress.     Appearance: Normal appearance.   Eyes:      General: Lids are normal. No scleral icterus.        Right eye: No discharge.         Left eye: No discharge.      Conjunctiva/sclera: Conjunctivae normal.   Neck:      Trachea: Phonation normal.   Cardiovascular:      Rate and Rhythm: Bradycardia present.      Comments: Monitor / Vital signs reviewed at time of visit  Pulmonary:      Effort: Pulmonary effort is normal. No tachypnea, accessory muscle usage or respiratory distress.   Abdominal:      General: There is no distension.   Musculoskeletal:      Left foot: Deformity (s/p partial amputation) present.   Skin:     Coloration: Skin is not cyanotic.   Neurological:      Mental Status: He is alert. He is not disoriented.   Psychiatric:         Attention and Perception: Attention normal.         Behavior: Behavior is cooperative.       Significant Labs:   Recent Labs   Lab 06/16/22  1100   HGBA1C 9.5*       Recent Labs   Lab 06/23/22  1741 06/23/22  2210 06/24/22  0749   POCTGLUCOSE 236* 214* 224*       Recent Labs   Lab 06/20/22  1815  06/22/22  0416 06/24/22  0545   WBC 6.22 6.33 7.24   HGB 10.4* 10.2* 10.1*   HCT 33.4* 33.0* 32.3*    320 333       Recent Labs   Lab 06/20/22  0425 06/22/22  0416 06/24/22  0545   GRAN 63.1  3.9 61.6  3.9 64.1  4.6   LYMPH 16.6*  1.0 19.9  1.3 19.3  1.4   MONO 11.7  0.7 9.6  0.6 9.0  0.7   EOS 0.5 0.5 0.5       Recent Labs   Lab 06/22/22  0416 06/23/22  0951 06/24/22  0545    135* 133*   K 4.7 4.2 4.4    99 100   CO2 30* 28 24   BUN 26* 27* 24*   CREATININE 1.7* 1.6* 1.5*   * 295* 229*   CALCIUM 9.0 9.1 8.9   ALBUMIN 2.6* 2.6* 2.6*   PHOS  --  3.2  --        Recent Labs   Lab 06/20/22  0425 06/22/22  0416 06/24/22  0545   ALKPHOS 69 76 71   ALT 9* 10 10   AST 14 15 18   PROT 7.0 6.7 6.5   BILITOT 0.3 0.2 0.2       Recent Labs   Lab 02/23/22  1407 06/15/22  1633   LACTATE  --  1.4   SEDRATE >120* >120*       SARS-CoV2 (COVID-19) Qualitative PCR (no units)   Date Value   03/29/2021 Not Detected   03/15/2021 Not Detected   03/02/2021 Not Detected   02/22/2021 Not Detected   01/29/2021 Not Detected     SARS-CoV-2 RNA, Amplification, Qual (no units)   Date Value   02/03/2021 Negative     POC Rapid COVID (no units)   Date Value   06/15/2022 Negative   02/18/2021 Negative   12/08/2020 Negative     CTA Runoff ABD PEL Bilat Lower Ext  Narrative: EXAMINATION:  CTA RUNOFF ABD PEL BILAT LOWER EXT    CLINICAL HISTORY:  Claudication or leg ischemia;Peripheral arterial disease (PAD), asymptomatic;    TECHNIQUE:  CT angiogram of the abdomen/pelvis with lower extremity runoff using xyz mL of  Omnipaque 350 IV contrast.  Multiplanar reconstructions performed.    COMPARISON:  CT abdomen pelvis 02/03/2012    FINDINGS:  Upper extremities within the gantry resulting in beam hardening artifact limiting assessment.    Abdominopelvic Vasculature:    Visualized aorta demonstrates mild calcific atherosclerosis with no convincing evidence of dissection, aneurysm, high-grade stenosis.    Renal, superior  mesenteric, and celiac arteries are patent.  GEORGIE is not well assessed due to artifact and poor opacification however appears patent with possible mild to moderate narrowing at the origin.    Common and external iliac arteries demonstrate no hemodynamically significant stenosis.  Bilateral internal iliac arteries demonstrate calcific and noncalcific atherosclerosis likely resulting in areas of moderate narrowing not well assessed due to artifact and poor opacification.    Right lower extremity:    Right common femoral and proximal superficial femoral arteries demonstrate no hemodynamically significant stenosis.  Right DFA with probable multifocal high-grade narrowing/occlusions versus poor opacification.  Moderate atherosclerotic disease of the mid to distal SFA with suspected areas of moderate to high-grade narrowing difficult to assess due to poor opacification.    Right popliteal artery with suspected multifocal moderate to high-grade narrowing limited assessment due to poor opacification.    Moderate narrowing at the trifurcation.    Suspected moderate narrowing at the peroneal origin with severe atherosclerotic disease and probable occlusions distally.    Severe narrowing at the origin of the posterior tibial artery with multifocal severe atherosclerotic disease distally limiting assessment.    Suspected high-grade narrowing at the origin of the anterior tibial artery, otherwise patent.    Left lower extremity:    Left common femoral and superficial femoral arteries are likely patent.  Left DFA with suspected multifocal high-grade narrowing/occlusions versus poor opacification.    Suspected multifocal occlusions of the distal left popliteal artery.  Trifurcation with high-grade narrowing.    Severe atherosclerotic disease of the posterior tibial artery and suspected occlusion of the origin.    Multifocal occlusions or high-grade narrowing of the peroneal artery.    Anterior tibial artery with areas of patency  otherwise there is severe atherosclerotic disease with multifocal high-grade narrowing and possible short-segment occlusions.    Bilateral lower extremities demonstrate diffuse subcutaneous edema and left-sided asymmetric muscular atrophy compared to the right.  Advanced degenerative changes of the knees.    Inferior Mediastinum/Heart: Normal.    Lung Bases: No nodules or consolidation.    Peritoneal Space: No ascites. No free air.    Liver: No focal mass.    Gallbladder: Tiny suspected gas foci within the gallbladder neck lumen which may suggest stones containing gas.    Bile Ducts: No evidence of dilated ducts.    Pancreas: No mass or peripancreatic fat stranding.    Spleen: Normal.    Adrenals: Unremarkable.    Bowel/Mesentery: No evidence of bowel obstruction or inflammation.  Diverticulosis coli.    Urinary Tract: No evidence of obstructive uropathy. Kidneys enhance symmetrically.  Left renal 1.8 cm cyst.    Retroperitoneum:  No significant adenopathy.    Pelvis:  Prostatomegaly.    Thoracic/Abdominal wall:  Gynecomastia bilaterally.    Bones: Moderate degenerative changes of the spine with multilevel mild endplate compression deformities of the lumbar spine unchanged.  Impression: Poor contrast opacification of the vasculature limits assessment.    Severe atherosclerotic disease of the vasculature.  Multifocal high-grade narrowing or occlusions of the calf vessels.  Vascular narrowing/occlusions may be overestimated due to poor opacification.    Electronically signed by resident: Jaylan Griffin  Date:    06/16/2022  Time:    15:08    Electronically signed by: Eric Carlos MD  Date:    06/16/2022  Time:    16:18  VAS Toe Brachial Indices Resting  Indication  ========    Peripheral Arterial Disease    Pressure Lower  ============    Rt brachial A syst BP  178 mmHg  Rt PTA BP  255 mmHg  Rt dors pedis A  mmHg  Rt toe BP  130 mmHg  Right MARIO ratio use:   post. tibial  Rt MARIO post tibial (rt post tib A BP /  max brach A BP) 1.43  Rt MARIO (rt dors ped A BP / max brach A BP) 1.43  Rt ankle BP / max brach A BP   1.43  Rt TBI (rt toe BP / max brach A BP)    0.73  Lt PTA BP  255 mmHg  Left MARIO ratio use:    post. tibial  Lt MARIO (lt post tibial A BP / max brach A BP)  1.43  Lt ankle BP / max brach A BP   1.43    PPG Lower  =========    Rt toe BP - PPG    130 mmHg  Rt toe digit waveform: severely dampened    Impression  =========    Right Leg: Segmental pressures are unreliable due to medial calcinosis; however, PVR waveforms suggest mild peripheral arterial  occlusive disease.  Left Leg: Segmental pressures are unreliable due to medial calcinosis; however, PVR waveforms suggest mild peripheral arterial  occlusive disease. Absent DPA signal.    -Bilateral Toe PPG's and Pressures were performed.  Rt lower digits: Toe PPG waveforms as described above. - TBI of 0.73 with a Great toe pressure of 130 mmHg mmHg is noted.  Lt lower digits: History of foot amputation.    DATE OF SERVICE: 06/16/2022                                                      Sonographer: Leatha Devi RDMS, RVT  Electronically Signed by: ADAM Jauregui M.D. at 06/16/2022-10:53      Labs and Imaging within the last 24 hours listed above were reviewed.       Diet: Diet diabetic [facility]; 2000 Calorie; Cardiac (Low Na/Chol)  Significant LDAs:   IV Access Type: Peripheral  Urinary Catheter Indication if present: Patient Does Not Have Urinary Catheter  Other Lines/Tubes/Drains:    HIGH RISK CONDITION(S):   Patient has a condition that poses threat to life and bodily function: limb ischemia likely to prevent wound healing      Goals of Care:    Previous admission:  2/17/21  Likely prognosis:  Fair  Code Status: Full Code  Comfort Only: No  Hospice: No  Goals at discharge: remain at home, with physician follow-up    Discharge Planning:  OXANA: 6/24/2022     Code Status: Full Code   Is the patient medically ready for discharge?: Yes    Reason for patient still in  hospital (select all that apply): Patient trending condition, Treatment, and Pending disposition  Discharge Plan A: Skilled Nursing Facility       Assessment/Plan:      * Osteomyelitis of left lower extremity  -With acute on chronic osteomyleitis of LLE including heel with purulent foul smelling drainage  -Podiatry consulted, Not septic appearing. S/p bone biopsy.  -ID consulted. Cultures growing Staph aureus and GBS   - L foot wound anaerobic culture growing Prevotella and Porphyromonas Somerae. PO Flagyl added.    - L foot bone culture growing Staph species, continued IV ceftriaxone and vancomycin (per Pharmacy dosing).  - with acute renal insufficiency, vancomycin changed to daptomycin and ceftriaxone discontinued as not needed based on current cultures. Continuing Flagyl  -Continuing daptomycin and Flagyl    Acute renal insufficiency  Per med history, patient no longer taking ARB.  sCr up slightly and vanco level > 20.  Discontinued ARB, holding diuretic for now. Trial of gentle hydration ineffective.  Discontinued vancomycin.  sCr improved     Peripheral arterial disease  -PVD contributing to current non-healing foot wound. Arterial US shows LLE atherosclerotic disease, areas of high-grade stenosis within the L popliteal artery  -Continue home statin, ASA  -Vascular Surgery consulted - patient considering surgery (BKA); seems reluctant primarily due to the time he expects it to take to obtain a prosthetic limb. Wants to try conservative management with antibiotics.  -Plan for SNF for IV antibiotics  - Follow up with Podiatry, ID and Vascular Surgery    Asymptomatic Mobitz (type) I (Wenckebach's) atrioventricular block  -Bradycardic in ED but asymptomatic, chronic issue  -Repeat EKG 6/19:  BPM 44 Normal sinus rhythm - AV block, 2:1 - Nonspecific T wave abnormality   -avoid BB and chronotropic CCB    Chronic kidney disease, stage III (moderate)  -sCr baseline 1.1  -Continue to monitor    Type 2 diabetes,  uncontrolled, with neuropathy  -Last A1c 2020, repeat > 9%. Per patient, taking 68 units Lantus QHS along with metformin  mg and glipizide 10 mg daily (Possibly, he is not sure)  -Per Pharm med history: patient is no longer taking insulin  -Mildly hypoglycemic on admit with BG 63  -decreased dose of basal insulin to 15 units Levemir, Titrate up as needed. Added prandial insulin 6/19  -worsening hyperglycemia as oral agents wash out. Levemir increased to BID and aspart increased with meals.  - doses adjusted 6/21; increased for 6/23, and again for 6/24    Essential hypertension  Continued losartan 100mg and HCTZ 25 mg, with hydralazine 25 mg PO q8h PRN  Added nifedipine 6/18  Consulted PharmD for med history: patient reports no longer taking isosorbide-hydralazine, losartan, and losartan-HCTZ  Discontinued ARB, held HCTZ due to increasing sCr. Continued nifedipine for BP control.  Consider resuming diuretic soon        Active Hospital Problems    Diagnosis  POA    *Osteomyelitis of left lower extremity [M86.9]  Yes    Acute renal insufficiency [N28.9]  No    Foot pain, left [M79.672]  Yes    Peripheral arterial disease [I73.9]  Yes     Chronic    Asymptomatic Mobitz (type) I (Wenckebach's) atrioventricular block [I44.1]  Yes    Chronic kidney disease, stage III (moderate) [N18.30]  Yes    Type 2 diabetes, uncontrolled, with neuropathy [E11.40, E11.65]  Yes     Chronic    Essential hypertension [I10]  Yes     Chronic      Resolved Hospital Problems   No resolved problems to display.       Inpatient Medications Prescribed for Management of Current Problems:     Scheduled Meds:    aspirin  81 mg Oral Daily    atorvastatin  80 mg Oral Daily    DAPTOmycin (CUBICIN)  IV  8 mg/kg Intravenous Q24H    gabapentin  200 mg Oral BID    insulin aspart U-100  15 Units Subcutaneous TIDWM    insulin detemir U-100  20 Units Subcutaneous BID    Lactobacillus rhamnosus GG  1 capsule Oral BID    metroNIDAZOLE  500  mg Oral Q8H    NIFEdipine  90 mg Oral Daily    sodium chloride 0.9%  10 mL Intravenous Q6H    tamsulosin  0.4 mg Oral Daily     Continuous Infusions:   As Needed: acetaminophen, albuterol-ipratropium, aluminum-magnesium hydroxide-simethicone, dextrose 10%, dextrose 10%, glucagon (human recombinant), glucose, glucose, hydrALAZINE, insulin aspart U-100, melatonin, naloxone, ondansetron, oxyCODONE-acetaminophen, prochlorperazine, sars-cov-2 (covid-19), sodium chloride 0.9%, Flushing PICC Protocol **AND** sodium chloride 0.9% **AND** sodium chloride 0.9%    VTE Risk Mitigation (From admission, onward)         Ordered     IP VTE HIGH RISK PATIENT  Once         06/15/22 1957     Place sequential compression device  Until discontinued         06/15/22 1957              I have assessed these finding virtually using telemed platform and with assistance of bedside nurse     The attending portion of this evaluation, treatment, and documentation was performed per Salma Strauss MD via Telemedicine AudioVisual using the secure [employer] software platform with 2 way audio/video. The provider was located off-site and the patient is located in the hospital. The aforementioned video software was utilized to document the relevant history and physical exam.     Salma Strauss MD  Department of Hospital Medicine   [facility] - Telemetry Stepdown

## 2022-06-25 NOTE — ASSESSMENT & PLAN NOTE
Continued losartan 100mg and HCTZ 25 mg, with hydralazine 25 mg PO q8h PRN  Added nifedipine 6/18  Consulted PharmD for med history: patient reports no longer taking isosorbide-hydralazine, losartan, and losartan-HCTZ  Discontinued ARB, held HCTZ due to increasing sCr. Continued nifedipine for BP control.  Consider resuming diuretic soon

## 2022-06-25 NOTE — SUBJECTIVE & OBJECTIVE
Telemedicine  This service was provided by Virtual Visit.    Patient was transferred to [facility] on:  6/18/2022    Chief Complaint   Patient presents with    Wound Check     Sent from podiatry and stated in pain. L toes amputated     The patient location is: 8092/8092 A   Admitted 6/15/2022  2:10 PM  Present with the patient at the time of the telemed/virtual assessment: Telepresenter    Interval History / Events Overnight:   The patient is able to provide adequate history. Additional history was obtained from past medical records. No significant events reported by Nursing. BP remains elevated  Patient complains of nothing specific. Symptoms have been unchanged since yesterday. Associated symptoms include: fatigue. Symptoms are stable.     Lab test(s) reviewed: hyperglycemia, sCr stable.    Review of Systems   Constitutional:  Negative for fever.   Respiratory:  Negative for shortness of breath.      Objective:     Vital Signs (Most Recent):  Temp: 98 °F (36.7 °C) (06/25/22 1529)  Pulse: (!) 46 (06/25/22 1529)  Resp: 19 (06/25/22 1529)  BP: (!) 163/72 (06/25/22 1529)  SpO2: (!) 92 % (06/25/22 1529)   Vital Signs (24h Range):  Temp:  [97.8 °F (36.6 °C)-98.6 °F (37 °C)] 97.8 °F (36.6 °C)  Pulse:  [40-60] 40  Resp:  [17-19] 19  SpO2:  [92 %-98 %] 98 %  BP: (154-187)/(70-82) 187/82     Weight: 124.3 kg (274 lb 0.5 oz)  Body mass index is 40.47 kg/m².    Intake/Output Summary (Last 24 hours) at 6/25/2022 1033  Last data filed at 6/25/2022 0800  Gross per 24 hour   Intake 480 ml   Output 700 ml   Net -220 ml        Physical Exam  Constitutional:       General: He is not in acute distress.     Appearance: Normal appearance.   Eyes:      General: Lids are normal. No scleral icterus.        Right eye: No discharge.         Left eye: No discharge.      Conjunctiva/sclera: Conjunctivae normal.   Neck:      Trachea: Phonation normal.   Cardiovascular:      Rate and Rhythm: Bradycardia present.      Comments:  Monitor / Vital signs reviewed at time of visit  Pulmonary:      Effort: Pulmonary effort is normal. No tachypnea, accessory muscle usage or respiratory distress.   Abdominal:      General: There is no distension.   Musculoskeletal:      Left foot: Deformity (s/p partial amputation) present.   Skin:     Coloration: Skin is not cyanotic.   Neurological:      Mental Status: He is alert. He is not disoriented.   Psychiatric:         Attention and Perception: Attention normal.         Behavior: Behavior is cooperative.       Significant Labs:   Recent Labs   Lab 06/16/22  1100   HGBA1C 9.5*       Recent Labs   Lab 06/24/22  1514 06/24/22  1729 06/24/22  2126   POCTGLUCOSE 249* 260* 242*       Recent Labs   Lab 06/20/22  0425 06/22/22  0416 06/24/22  0545   WBC 6.22 6.33 7.24   HGB 10.4* 10.2* 10.1*   HCT 33.4* 33.0* 32.3*    320 333       Recent Labs   Lab 06/20/22 0425 06/22/22  0416 06/24/22  0545   GRAN 63.1  3.9 61.6  3.9 64.1  4.6   LYMPH 16.6*  1.0 19.9  1.3 19.3  1.4   MONO 11.7  0.7 9.6  0.6 9.0  0.7   EOS 0.5 0.5 0.5       Recent Labs   Lab 06/22/22  0416 06/23/22  0951 06/24/22  0545    135* 133*   K 4.7 4.2 4.4    99 100   CO2 30* 28 24   BUN 26* 27* 24*   CREATININE 1.7* 1.6* 1.5*   * 295* 229*   CALCIUM 9.0 9.1 8.9   ALBUMIN 2.6* 2.6* 2.6*   PHOS  --  3.2  --        Recent Labs   Lab 06/20/22 0425 06/22/22  0416 06/24/22  0545   ALKPHOS 69 76 71   ALT 9* 10 10   AST 14 15 18   PROT 7.0 6.7 6.5   BILITOT 0.3 0.2 0.2       Recent Labs   Lab 02/23/22  1407 06/15/22  1633   LACTATE  --  1.4   SEDRATE >120* >120*       SARS-CoV2 (COVID-19) Qualitative PCR (no units)   Date Value   03/29/2021 Not Detected   03/15/2021 Not Detected   03/02/2021 Not Detected   02/22/2021 Not Detected   01/29/2021 Not Detected     SARS-CoV-2 RNA, Amplification, Qual (no units)   Date Value   02/03/2021 Negative     POC Rapid COVID (no units)   Date Value   06/15/2022 Negative   02/18/2021  Negative   12/08/2020 Negative     X-Ray Chest 1 View S/P PICC Line by Nursing  Narrative: EXAMINATION:  XR CHEST 1 VIEW S/P PICC LINE BY NURSING    CLINICAL HISTORY:  s/p picc placement;    TECHNIQUE:  Single frontal view of chest.    COMPARISON:  March 3, 2021    FINDINGS:  Right PICC line now evident, tip superimposing lower superior vena caval soft tissues.  Normal heart size.  Normal pulmonary vasculature.  No focal infiltrates.  No pleural fluid or pneumothorax.  No acute bony findings.  Impression: 1. Right PICC line tip superimposes lower superior vena caval soft tissues.  2. No active cardiac or pulmonary findings    Electronically signed by: John Thomas  Date:    06/24/2022  Time:    12:31      Labs and Imaging within the last 24 hours listed above were reviewed.       Diet: Diet diabetic [facility]; 2000 Calorie; Cardiac (Low Na/Chol)  Diet diabetic  Significant LDAs:   IV Access Type: Peripheral  Urinary Catheter Indication if present: Patient Does Not Have Urinary Catheter  Other Lines/Tubes/Drains:    HIGH RISK CONDITION(S):   Patient has a condition that poses threat to life and bodily function: limb ischemia likely to prevent wound healing      Goals of Care:    Previous admission:  2/17/21  Likely prognosis:  Fair  Code Status: Full Code  Comfort Only: No  Hospice: No  Goals at discharge: remain at home, with physician follow-up    Discharge Planning:  OXANA: 6/27/2022     Code Status: Full Code   Is the patient medically ready for discharge?: Yes    Reason for patient still in hospital (select all that apply): Patient trending condition, Treatment, and Pending disposition  Discharge Plan A: Skilled Nursing Facility

## 2022-06-25 NOTE — PLAN OF CARE
Problem: Adult Inpatient Plan of Care  Goal: Plan of Care Review  Outcome: Ongoing, Progressing  Goal: Patient-Specific Goal (Individualized)  Outcome: Ongoing, Progressing  Goal: Absence of Hospital-Acquired Illness or Injury  Outcome: Ongoing, Progressing  Goal: Optimal Comfort and Wellbeing  Outcome: Ongoing, Progressing  Goal: Readiness for Transition of Care  Outcome: Ongoing, Progressing     Problem: Bariatric Environmental Safety  Goal: Safety Maintained with Care  Outcome: Ongoing, Progressing     Problem: Diabetes Comorbidity  Goal: Blood Glucose Level Within Targeted Range  Outcome: Ongoing, Progressing     Problem: Fluid and Electrolyte Imbalance (Acute Kidney Injury/Impairment)  Goal: Fluid and Electrolyte Balance  Outcome: Ongoing, Progressing     Problem: Oral Intake Inadequate (Acute Kidney Injury/Impairment)  Goal: Optimal Nutrition Intake  Outcome: Ongoing, Progressing     Problem: Renal Function Impairment (Acute Kidney Injury/Impairment)  Goal: Effective Renal Function  Outcome: Ongoing, Progressing     Problem: Impaired Wound Healing  Goal: Optimal Wound Healing  Outcome: Ongoing, Progressing     Problem: Skin Injury Risk Increased  Goal: Skin Health and Integrity  Outcome: Ongoing, Progressing     Problem: Infection  Goal: Absence of Infection Signs and Symptoms  Outcome: Ongoing, Progressing     Problem: Fall Injury Risk  Goal: Absence of Fall and Fall-Related Injury  Outcome: Ongoing, Progressing     Patient in good condition. Wound care performed. Frequent checks performed and ongoing tele monitoring in progress. Edu and POC reviewed with patient.

## 2022-06-26 LAB
ALBUMIN SERPL BCP-MCNC: 2.5 G/DL (ref 3.5–5.2)
ALP SERPL-CCNC: 69 U/L (ref 55–135)
ALT SERPL W/O P-5'-P-CCNC: 14 U/L (ref 10–44)
ANION GAP SERPL CALC-SCNC: 9 MMOL/L (ref 8–16)
AST SERPL-CCNC: 19 U/L (ref 10–40)
BASOPHILS # BLD AUTO: 0.03 K/UL (ref 0–0.2)
BASOPHILS NFR BLD: 0.4 % (ref 0–1.9)
BILIRUB SERPL-MCNC: 0.2 MG/DL (ref 0.1–1)
BUN SERPL-MCNC: 22 MG/DL (ref 8–23)
CALCIUM SERPL-MCNC: 8.7 MG/DL (ref 8.7–10.5)
CHLORIDE SERPL-SCNC: 103 MMOL/L (ref 95–110)
CO2 SERPL-SCNC: 25 MMOL/L (ref 23–29)
CREAT SERPL-MCNC: 1.3 MG/DL (ref 0.5–1.4)
DIFFERENTIAL METHOD: ABNORMAL
EOSINOPHIL # BLD AUTO: 0.6 K/UL (ref 0–0.5)
EOSINOPHIL NFR BLD: 6.9 % (ref 0–8)
ERYTHROCYTE [DISTWIDTH] IN BLOOD BY AUTOMATED COUNT: 16.4 % (ref 11.5–14.5)
EST. GFR  (AFRICAN AMERICAN): >60 ML/MIN/1.73 M^2
EST. GFR  (NON AFRICAN AMERICAN): 54.1 ML/MIN/1.73 M^2
GLUCOSE SERPL-MCNC: 215 MG/DL (ref 70–110)
HCT VFR BLD AUTO: 34.3 % (ref 40–54)
HGB BLD-MCNC: 10.6 G/DL (ref 14–18)
IMM GRANULOCYTES # BLD AUTO: 0.02 K/UL (ref 0–0.04)
IMM GRANULOCYTES NFR BLD AUTO: 0.2 % (ref 0–0.5)
LYMPHOCYTES # BLD AUTO: 1.4 K/UL (ref 1–4.8)
LYMPHOCYTES NFR BLD: 16.8 % (ref 18–48)
MCH RBC QN AUTO: 25.8 PG (ref 27–31)
MCHC RBC AUTO-ENTMCNC: 30.9 G/DL (ref 32–36)
MCV RBC AUTO: 84 FL (ref 82–98)
MONOCYTES # BLD AUTO: 0.7 K/UL (ref 0.3–1)
MONOCYTES NFR BLD: 8.6 % (ref 4–15)
NEUTROPHILS # BLD AUTO: 5.7 K/UL (ref 1.8–7.7)
NEUTROPHILS NFR BLD: 67.1 % (ref 38–73)
NRBC BLD-RTO: 0 /100 WBC
PLATELET # BLD AUTO: 371 K/UL (ref 150–450)
PMV BLD AUTO: 10.4 FL (ref 9.2–12.9)
POCT GLUCOSE: 175 MG/DL (ref 70–110)
POCT GLUCOSE: 179 MG/DL (ref 70–110)
POCT GLUCOSE: 205 MG/DL (ref 70–110)
POCT GLUCOSE: 258 MG/DL (ref 70–110)
POTASSIUM SERPL-SCNC: 4.1 MMOL/L (ref 3.5–5.1)
PROT SERPL-MCNC: 7.1 G/DL (ref 6–8.4)
RBC # BLD AUTO: 4.11 M/UL (ref 4.6–6.2)
SODIUM SERPL-SCNC: 137 MMOL/L (ref 136–145)
WBC # BLD AUTO: 8.49 K/UL (ref 3.9–12.7)

## 2022-06-26 PROCEDURE — 99233 SBSQ HOSP IP/OBS HIGH 50: CPT | Mod: 95,,, | Performed by: INTERNAL MEDICINE

## 2022-06-26 PROCEDURE — 63600175 PHARM REV CODE 636 W HCPCS: Performed by: PHYSICIAN ASSISTANT

## 2022-06-26 PROCEDURE — 25000003 PHARM REV CODE 250: Performed by: HOSPITALIST

## 2022-06-26 PROCEDURE — 25000003 PHARM REV CODE 250: Performed by: REGISTERED NURSE

## 2022-06-26 PROCEDURE — A4216 STERILE WATER/SALINE, 10 ML: HCPCS | Performed by: INTERNAL MEDICINE

## 2022-06-26 PROCEDURE — 25000003 PHARM REV CODE 250: Performed by: PHYSICIAN ASSISTANT

## 2022-06-26 PROCEDURE — 80053 COMPREHEN METABOLIC PANEL: CPT | Performed by: STUDENT IN AN ORGANIZED HEALTH CARE EDUCATION/TRAINING PROGRAM

## 2022-06-26 PROCEDURE — 99233 PR SUBSEQUENT HOSPITAL CARE,LEVL III: ICD-10-PCS | Mod: 95,,, | Performed by: INTERNAL MEDICINE

## 2022-06-26 PROCEDURE — 94760 N-INVAS EAR/PLS OXIMETRY 1: CPT

## 2022-06-26 PROCEDURE — 36415 COLL VENOUS BLD VENIPUNCTURE: CPT | Performed by: STUDENT IN AN ORGANIZED HEALTH CARE EDUCATION/TRAINING PROGRAM

## 2022-06-26 PROCEDURE — 20600001 HC STEP DOWN PRIVATE ROOM

## 2022-06-26 PROCEDURE — 27000207 HC ISOLATION

## 2022-06-26 PROCEDURE — C9399 UNCLASSIFIED DRUGS OR BIOLOG: HCPCS | Performed by: INTERNAL MEDICINE

## 2022-06-26 PROCEDURE — 25000003 PHARM REV CODE 250: Performed by: INTERNAL MEDICINE

## 2022-06-26 PROCEDURE — 85025 COMPLETE CBC W/AUTO DIFF WBC: CPT | Performed by: STUDENT IN AN ORGANIZED HEALTH CARE EDUCATION/TRAINING PROGRAM

## 2022-06-26 RX ORDER — INSULIN ASPART 100 [IU]/ML
20 INJECTION, SOLUTION INTRAVENOUS; SUBCUTANEOUS
Status: DISCONTINUED | OUTPATIENT
Start: 2022-06-26 | End: 2022-07-08 | Stop reason: HOSPADM

## 2022-06-26 RX ORDER — HYDROCHLOROTHIAZIDE 12.5 MG/1
12.5 TABLET ORAL DAILY
Status: DISCONTINUED | OUTPATIENT
Start: 2022-06-27 | End: 2022-06-30

## 2022-06-26 RX ADMIN — Medication 10 ML: at 12:06

## 2022-06-26 RX ADMIN — INSULIN ASPART 3 UNITS: 100 INJECTION, SOLUTION INTRAVENOUS; SUBCUTANEOUS at 12:06

## 2022-06-26 RX ADMIN — TAMSULOSIN HYDROCHLORIDE 0.4 MG: 0.4 CAPSULE ORAL at 08:06

## 2022-06-26 RX ADMIN — METRONIDAZOLE 500 MG: 500 TABLET ORAL at 03:06

## 2022-06-26 RX ADMIN — METRONIDAZOLE 500 MG: 500 TABLET ORAL at 05:06

## 2022-06-26 RX ADMIN — INSULIN ASPART 20 UNITS: 100 INJECTION, SOLUTION INTRAVENOUS; SUBCUTANEOUS at 06:06

## 2022-06-26 RX ADMIN — INSULIN DETEMIR 23 UNITS: 100 INJECTION, SOLUTION SUBCUTANEOUS at 08:06

## 2022-06-26 RX ADMIN — MUPIROCIN: 20 OINTMENT TOPICAL at 09:06

## 2022-06-26 RX ADMIN — NIFEDIPINE 90 MG: 30 TABLET, FILM COATED, EXTENDED RELEASE ORAL at 08:06

## 2022-06-26 RX ADMIN — GABAPENTIN 200 MG: 100 CAPSULE ORAL at 08:06

## 2022-06-26 RX ADMIN — Medication 1 CAPSULE: at 09:06

## 2022-06-26 RX ADMIN — HYDRALAZINE HYDROCHLORIDE 50 MG: 50 TABLET ORAL at 04:06

## 2022-06-26 RX ADMIN — Medication 10 ML: at 05:06

## 2022-06-26 RX ADMIN — INSULIN ASPART 2 UNITS: 100 INJECTION, SOLUTION INTRAVENOUS; SUBCUTANEOUS at 08:06

## 2022-06-26 RX ADMIN — ATORVASTATIN CALCIUM 80 MG: 20 TABLET, FILM COATED ORAL at 08:06

## 2022-06-26 RX ADMIN — Medication 1 CAPSULE: at 08:06

## 2022-06-26 RX ADMIN — METRONIDAZOLE 500 MG: 500 TABLET ORAL at 09:06

## 2022-06-26 RX ADMIN — GABAPENTIN 200 MG: 100 CAPSULE ORAL at 09:06

## 2022-06-26 RX ADMIN — INSULIN ASPART 20 UNITS: 100 INJECTION, SOLUTION INTRAVENOUS; SUBCUTANEOUS at 12:06

## 2022-06-26 RX ADMIN — DAPTOMYCIN 995 MG: 350 INJECTION, POWDER, LYOPHILIZED, FOR SOLUTION INTRAVENOUS at 05:06

## 2022-06-26 RX ADMIN — ASPIRIN 81 MG: 81 TABLET, COATED ORAL at 08:06

## 2022-06-26 RX ADMIN — MUPIROCIN: 20 OINTMENT TOPICAL at 08:06

## 2022-06-26 RX ADMIN — INSULIN ASPART 18 UNITS: 100 INJECTION, SOLUTION INTRAVENOUS; SUBCUTANEOUS at 08:06

## 2022-06-26 NOTE — PLAN OF CARE
Problem: Adult Inpatient Plan of Care  Goal: Plan of Care Review  Outcome: Ongoing, Progressing  Goal: Patient-Specific Goal (Individualized)  Outcome: Ongoing, Progressing  Goal: Absence of Hospital-Acquired Illness or Injury  Outcome: Ongoing, Progressing  Goal: Optimal Comfort and Wellbeing  Outcome: Ongoing, Progressing  Goal: Readiness for Transition of Care  Outcome: Ongoing, Progressing     Problem: Bariatric Environmental Safety  Goal: Safety Maintained with Care  Outcome: Ongoing, Progressing     Problem: Diabetes Comorbidity  Goal: Blood Glucose Level Within Targeted Range  Outcome: Ongoing, Progressing     Problem: Fluid and Electrolyte Imbalance (Acute Kidney Injury/Impairment)  Goal: Fluid and Electrolyte Balance  Outcome: Ongoing, Progressing     Problem: Oral Intake Inadequate (Acute Kidney Injury/Impairment)  Goal: Optimal Nutrition Intake  Outcome: Ongoing, Progressing     Problem: Renal Function Impairment (Acute Kidney Injury/Impairment)  Goal: Effective Renal Function  Outcome: Ongoing, Progressing     Problem: Impaired Wound Healing  Goal: Optimal Wound Healing  Outcome: Ongoing, Progressing     Problem: Skin Injury Risk Increased  Goal: Skin Health and Integrity  Outcome: Ongoing, Progressing     Problem: Infection  Goal: Absence of Infection Signs and Symptoms  Outcome: Ongoing, Progressing     Problem: Fall Injury Risk  Goal: Absence of Fall and Fall-Related Injury  Outcome: Ongoing, Progressing     Pt in good condition throughout shift, without distress or incident. Continuous tele monitoring in progress. Frequent checks performed. All ADLs completed

## 2022-06-26 NOTE — PLAN OF CARE
Problem: Adult Inpatient Plan of Care  Goal: Plan of Care Review  Outcome: Ongoing, Progressing  Goal: Patient-Specific Goal (Individualized)  Outcome: Ongoing, Progressing  Goal: Absence of Hospital-Acquired Illness or Injury  Outcome: Ongoing, Progressing  Goal: Optimal Comfort and Wellbeing  Outcome: Ongoing, Progressing  Goal: Readiness for Transition of Care  Outcome: Ongoing, Progressing     Problem: Bariatric Environmental Safety  Goal: Safety Maintained with Care  Outcome: Ongoing, Progressing     Problem: Diabetes Comorbidity  Goal: Blood Glucose Level Within Targeted Range  Outcome: Ongoing, Progressing     Problem: Fluid and Electrolyte Imbalance (Acute Kidney Injury/Impairment)  Goal: Fluid and Electrolyte Balance  Outcome: Ongoing, Progressing     Problem: Oral Intake Inadequate (Acute Kidney Injury/Impairment)  Goal: Optimal Nutrition Intake  Outcome: Ongoing, Progressing     Problem: Renal Function Impairment (Acute Kidney Injury/Impairment)  Goal: Effective Renal Function  Outcome: Ongoing, Progressing     Problem: Impaired Wound Healing  Goal: Optimal Wound Healing  Outcome: Ongoing, Progressing     Problem: Skin Injury Risk Increased  Goal: Skin Health and Integrity  Outcome: Ongoing, Progressing     Problem: Infection  Goal: Absence of Infection Signs and Symptoms  Outcome: Ongoing, Progressing     Problem: Fall Injury Risk  Goal: Absence of Fall and Fall-Related Injury  Outcome: Ongoing, Progressing     Pt AO x4, VSS on RA; meds given without issue; BG monitored and covered per order; pt rested throughout shift; no issues to report

## 2022-06-26 NOTE — SUBJECTIVE & OBJECTIVE
Telemedicine  This service was provided by Virtual Visit.    Patient was transferred to [facility] on:  6/18/2022    Chief Complaint   Patient presents with    Wound Check     Sent from podiatry and stated in pain. L toes amputated     The patient location is: 8092/8092 A   Admitted 6/15/2022  2:10 PM  Present with the patient at the time of the telemed/virtual assessment: Telepresenter    Interval History / Events Overnight:   The patient is able to provide adequate history. Additional history was obtained from past medical records. No significant events reported by Nursing. BP remains elevated  Patient complains of nothing specific. Symptoms have been unchanged since yesterday. Associated symptoms include: fatigue. Symptoms are stable.     Lab test(s) reviewed: hyperglycemia, sCr stable.    Review of Systems   Constitutional:  Negative for fever.   Respiratory:  Negative for shortness of breath.      Objective:     Vital Signs (Most Recent):  Temp: 98.7 °F (37.1 °C) (06/26/22 0823)  Pulse: (!) 47 (06/26/22 0823)  Resp: 16 (06/26/22 0823)  BP: (!) 170/72 (06/26/22 0823)  SpO2: 95 % (06/26/22 0823)   Vital Signs (24h Range):  Temp:  [98 °F (36.7 °C)-98.7 °F (37.1 °C)] 98.7 °F (37.1 °C)  Pulse:  [43-53] 47  Resp:  [16-19] 16  SpO2:  [92 %-96 %] 95 %  BP: (153-188)/(67-77) 170/72     Weight: 124.3 kg (274 lb 0.5 oz)  Body mass index is 40.47 kg/m².    Intake/Output Summary (Last 24 hours) at 6/26/2022 1108  Last data filed at 6/26/2022 0800  Gross per 24 hour   Intake 350 ml   Output 1000 ml   Net -650 ml        Physical Exam  Constitutional:       General: He is not in acute distress.     Appearance: Normal appearance.   Eyes:      General: Lids are normal. No scleral icterus.        Right eye: No discharge.         Left eye: No discharge.      Conjunctiva/sclera: Conjunctivae normal.   Neck:      Trachea: Phonation normal.   Cardiovascular:      Rate and Rhythm: Bradycardia present.      Comments:  Monitor / Vital signs reviewed at time of visit  Pulmonary:      Effort: Pulmonary effort is normal. No tachypnea, accessory muscle usage or respiratory distress.   Abdominal:      General: There is no distension.   Musculoskeletal:      Left foot: Deformity (s/p partial amputation) present.   Skin:     Coloration: Skin is not cyanotic.   Neurological:      Mental Status: He is alert. He is not disoriented.   Psychiatric:         Attention and Perception: Attention normal.         Behavior: Behavior is cooperative.       Significant Labs:   Recent Labs   Lab 06/16/22  1100   HGBA1C 9.5*       Recent Labs   Lab 06/25/22  1527 06/25/22  2208 06/26/22  0825   POCTGLUCOSE 196* 149* 205*       Recent Labs   Lab 06/22/22  0416 06/24/22  0545 06/26/22  0538   WBC 6.33 7.24 8.49   HGB 10.2* 10.1* 10.6*   HCT 33.0* 32.3* 34.3*    333 371       Recent Labs   Lab 06/22/22  0416 06/24/22  0545 06/26/22  0538   GRAN 61.6  3.9 64.1  4.6 67.1  5.7   LYMPH 19.9  1.3 19.3  1.4 16.8*  1.4   MONO 9.6  0.6 9.0  0.7 8.6  0.7   EOS 0.5 0.5 0.6*       Recent Labs   Lab 06/23/22  0951 06/24/22  0545 06/26/22  0538   * 133* 137   K 4.2 4.4 4.1   CL 99 100 103   CO2 28 24 25   BUN 27* 24* 22   CREATININE 1.6* 1.5* 1.3   * 229* 215*   CALCIUM 9.1 8.9 8.7   ALBUMIN 2.6* 2.6* 2.5*   PHOS 3.2  --   --        Recent Labs   Lab 06/22/22  0416 06/24/22  0545 06/26/22  0538   ALKPHOS 76 71 69   ALT 10 10 14   AST 15 18 19   PROT 6.7 6.5 7.1   BILITOT 0.2 0.2 0.2       Recent Labs   Lab 02/23/22  1407 06/15/22  1633   LACTATE  --  1.4   SEDRATE >120* >120*       SARS-CoV2 (COVID-19) Qualitative PCR (no units)   Date Value   03/29/2021 Not Detected   03/15/2021 Not Detected   03/02/2021 Not Detected   02/22/2021 Not Detected   01/29/2021 Not Detected     SARS-CoV-2 RNA, Amplification, Qual (no units)   Date Value   02/03/2021 Negative     POC Rapid COVID (no units)   Date Value   06/15/2022 Negative   02/18/2021 Negative    12/08/2020 Negative     X-Ray Chest 1 View S/P PICC Line by Nursing  Narrative: EXAMINATION:  XR CHEST 1 VIEW S/P PICC LINE BY NURSING    CLINICAL HISTORY:  s/p picc placement;    TECHNIQUE:  Single frontal view of chest.    COMPARISON:  March 3, 2021    FINDINGS:  Right PICC line now evident, tip superimposing lower superior vena caval soft tissues.  Normal heart size.  Normal pulmonary vasculature.  No focal infiltrates.  No pleural fluid or pneumothorax.  No acute bony findings.  Impression: 1. Right PICC line tip superimposes lower superior vena caval soft tissues.  2. No active cardiac or pulmonary findings    Electronically signed by: John Thomas  Date:    06/24/2022  Time:    12:31      Labs and Imaging within the last 24 hours listed above were reviewed.       Diet: Diet diabetic [facility]; 2000 Calorie; Cardiac (Low Na/Chol)  Diet diabetic  Significant LDAs:   IV Access Type: Peripheral  Urinary Catheter Indication if present: Patient Does Not Have Urinary Catheter  Other Lines/Tubes/Drains:    HIGH RISK CONDITION(S):   Patient has a condition that poses threat to life and bodily function: limb ischemia likely to prevent wound healing      Goals of Care:    Previous admission:  2/17/21  Likely prognosis:  Fair  Code Status: Full Code  Comfort Only: No  Hospice: No  Goals at discharge: remain at home, with physician follow-up    Discharge Planning:  OXANA: 6/27/2022     Code Status: Full Code   Is the patient medically ready for discharge?: Yes    Reason for patient still in hospital (select all that apply): Patient trending condition, Treatment, and Pending disposition  Discharge Plan A: Skilled Nursing Facility

## 2022-06-27 LAB
ANION GAP SERPL CALC-SCNC: 8 MMOL/L (ref 8–16)
BUN SERPL-MCNC: 23 MG/DL (ref 8–23)
CALCIUM SERPL-MCNC: 8.7 MG/DL (ref 8.7–10.5)
CHLORIDE SERPL-SCNC: 104 MMOL/L (ref 95–110)
CO2 SERPL-SCNC: 24 MMOL/L (ref 23–29)
CREAT SERPL-MCNC: 1.4 MG/DL (ref 0.5–1.4)
EST. GFR  (AFRICAN AMERICAN): 57.2 ML/MIN/1.73 M^2
EST. GFR  (NON AFRICAN AMERICAN): 49.5 ML/MIN/1.73 M^2
GLUCOSE SERPL-MCNC: 168 MG/DL (ref 70–110)
MAGNESIUM SERPL-MCNC: 1.7 MG/DL (ref 1.6–2.6)
POCT GLUCOSE: 134 MG/DL (ref 70–110)
POCT GLUCOSE: 140 MG/DL (ref 70–110)
POCT GLUCOSE: 204 MG/DL (ref 70–110)
POCT GLUCOSE: 228 MG/DL (ref 70–110)
POTASSIUM SERPL-SCNC: 4.2 MMOL/L (ref 3.5–5.1)
SODIUM SERPL-SCNC: 136 MMOL/L (ref 136–145)

## 2022-06-27 PROCEDURE — 25000003 PHARM REV CODE 250: Performed by: HOSPITALIST

## 2022-06-27 PROCEDURE — 83735 ASSAY OF MAGNESIUM: CPT | Performed by: PHYSICIAN ASSISTANT

## 2022-06-27 PROCEDURE — 25000003 PHARM REV CODE 250: Performed by: INTERNAL MEDICINE

## 2022-06-27 PROCEDURE — 99233 PR SUBSEQUENT HOSPITAL CARE,LEVL III: ICD-10-PCS | Mod: 95,,, | Performed by: INTERNAL MEDICINE

## 2022-06-27 PROCEDURE — 25000003 PHARM REV CODE 250: Performed by: PHYSICIAN ASSISTANT

## 2022-06-27 PROCEDURE — 63600175 PHARM REV CODE 636 W HCPCS: Performed by: PHYSICIAN ASSISTANT

## 2022-06-27 PROCEDURE — 20600001 HC STEP DOWN PRIVATE ROOM

## 2022-06-27 PROCEDURE — 27000207 HC ISOLATION

## 2022-06-27 PROCEDURE — 99233 SBSQ HOSP IP/OBS HIGH 50: CPT | Mod: 95,,, | Performed by: INTERNAL MEDICINE

## 2022-06-27 PROCEDURE — A4216 STERILE WATER/SALINE, 10 ML: HCPCS | Performed by: INTERNAL MEDICINE

## 2022-06-27 PROCEDURE — 36415 COLL VENOUS BLD VENIPUNCTURE: CPT | Performed by: PHYSICIAN ASSISTANT

## 2022-06-27 PROCEDURE — 80048 BASIC METABOLIC PNL TOTAL CA: CPT | Performed by: PHYSICIAN ASSISTANT

## 2022-06-27 PROCEDURE — 25000003 PHARM REV CODE 250: Performed by: REGISTERED NURSE

## 2022-06-27 RX ORDER — INSULIN ASPART 100 [IU]/ML
20 INJECTION, SOLUTION INTRAVENOUS; SUBCUTANEOUS
Refills: 0 | Status: ON HOLD
Start: 2022-06-27 | End: 2023-05-25 | Stop reason: SDUPTHER

## 2022-06-27 RX ADMIN — DAPTOMYCIN 995 MG: 350 INJECTION, POWDER, LYOPHILIZED, FOR SOLUTION INTRAVENOUS at 05:06

## 2022-06-27 RX ADMIN — INSULIN ASPART 2 UNITS: 100 INJECTION, SOLUTION INTRAVENOUS; SUBCUTANEOUS at 12:06

## 2022-06-27 RX ADMIN — HYDRALAZINE HYDROCHLORIDE 50 MG: 50 TABLET ORAL at 09:06

## 2022-06-27 RX ADMIN — MUPIROCIN: 20 OINTMENT TOPICAL at 08:06

## 2022-06-27 RX ADMIN — GABAPENTIN 200 MG: 100 CAPSULE ORAL at 08:06

## 2022-06-27 RX ADMIN — METRONIDAZOLE 500 MG: 500 TABLET ORAL at 05:06

## 2022-06-27 RX ADMIN — NIFEDIPINE 90 MG: 30 TABLET, FILM COATED, EXTENDED RELEASE ORAL at 08:06

## 2022-06-27 RX ADMIN — Medication 10 ML: at 06:06

## 2022-06-27 RX ADMIN — INSULIN ASPART 20 UNITS: 100 INJECTION, SOLUTION INTRAVENOUS; SUBCUTANEOUS at 08:06

## 2022-06-27 RX ADMIN — Medication 1 CAPSULE: at 08:06

## 2022-06-27 RX ADMIN — Medication 10 ML: at 05:06

## 2022-06-27 RX ADMIN — INSULIN ASPART 20 UNITS: 100 INJECTION, SOLUTION INTRAVENOUS; SUBCUTANEOUS at 05:06

## 2022-06-27 RX ADMIN — GABAPENTIN 200 MG: 100 CAPSULE ORAL at 09:06

## 2022-06-27 RX ADMIN — INSULIN ASPART 20 UNITS: 100 INJECTION, SOLUTION INTRAVENOUS; SUBCUTANEOUS at 12:06

## 2022-06-27 RX ADMIN — HYDRALAZINE HYDROCHLORIDE 50 MG: 50 TABLET ORAL at 12:06

## 2022-06-27 RX ADMIN — METRONIDAZOLE 500 MG: 500 TABLET ORAL at 01:06

## 2022-06-27 RX ADMIN — ASPIRIN 81 MG: 81 TABLET, COATED ORAL at 09:06

## 2022-06-27 RX ADMIN — Medication 1 CAPSULE: at 09:06

## 2022-06-27 RX ADMIN — TAMSULOSIN HYDROCHLORIDE 0.4 MG: 0.4 CAPSULE ORAL at 08:06

## 2022-06-27 RX ADMIN — Medication 10 ML: at 12:06

## 2022-06-27 RX ADMIN — MUPIROCIN: 20 OINTMENT TOPICAL at 09:06

## 2022-06-27 RX ADMIN — INSULIN ASPART 2 UNITS: 100 INJECTION, SOLUTION INTRAVENOUS; SUBCUTANEOUS at 09:06

## 2022-06-27 RX ADMIN — METRONIDAZOLE 500 MG: 500 TABLET ORAL at 09:06

## 2022-06-27 RX ADMIN — ATORVASTATIN CALCIUM 80 MG: 20 TABLET, FILM COATED ORAL at 08:06

## 2022-06-27 RX ADMIN — HYDROCHLOROTHIAZIDE 12.5 MG: 12.5 TABLET ORAL at 09:06

## 2022-06-27 NOTE — SUBJECTIVE & OBJECTIVE
Telemedicine  This service was provided by Virtual Visit.    Patient was transferred to [facility] on:  6/18/2022    Chief Complaint   Patient presents with    Wound Check     Sent from podiatry and stated in pain. L toes amputated     The patient location is: 8092/8092 A   Admitted 6/15/2022  2:10 PM  Present with the patient at the time of the telemed/virtual assessment: Telepresenter    Interval History / Events Overnight:   The patient is able to provide adequate history. Additional history was obtained from past medical records. No significant events reported by Nursing. BP improved slightly  Patient complains of nothing specific. Symptoms have been unchanged since yesterday. Associated symptoms include: fatigue. Symptoms are stable.     Lab test(s) reviewed: hyperglycemia, sCr stable.    Review of Systems   Constitutional:  Negative for fever.   Respiratory:  Negative for shortness of breath.      Objective:     Vital Signs (Most Recent):  Temp: 98.2 °F (36.8 °C) (06/27/22 0841)  Pulse: 60 (06/27/22 0841)  Resp: 20 (06/27/22 0841)  BP: (!) 159/70 (06/27/22 0903)  SpO2: 97 % (06/27/22 0841)   Vital Signs (24h Range):  Temp:  [98.2 °F (36.8 °C)-98.8 °F (37.1 °C)] 98.2 °F (36.8 °C)  Pulse:  [40-74] 60  Resp:  [18-23] 20  SpO2:  [94 %-97 %] 97 %  BP: (137-185)/(66-78) 159/70     Weight: 124.3 kg (274 lb 0.5 oz)  Body mass index is 40.47 kg/m².    Intake/Output Summary (Last 24 hours) at 6/27/2022 1132  Last data filed at 6/26/2022 2100  Gross per 24 hour   Intake 400 ml   Output 150 ml   Net 250 ml        Physical Exam  Constitutional:       General: He is not in acute distress.     Appearance: Normal appearance.   Eyes:      General: Lids are normal. No scleral icterus.        Right eye: No discharge.         Left eye: No discharge.      Conjunctiva/sclera: Conjunctivae normal.   Neck:      Trachea: Phonation normal.   Cardiovascular:      Rate and Rhythm: Bradycardia present.      Comments:  Monitor / Vital signs reviewed at time of visit  Pulmonary:      Effort: Pulmonary effort is normal. No tachypnea, accessory muscle usage or respiratory distress.   Abdominal:      General: There is no distension.   Musculoskeletal:      Left foot: Deformity (s/p partial amputation) present.   Skin:     Coloration: Skin is not cyanotic.   Neurological:      Mental Status: He is alert. He is not disoriented.   Psychiatric:         Attention and Perception: Attention normal.         Behavior: Behavior is cooperative.       Significant Labs:   Recent Labs   Lab 06/16/22  1100   HGBA1C 9.5*       Recent Labs   Lab 06/26/22  1808 06/26/22  2119 06/27/22  0844   POCTGLUCOSE 179* 175* 228*       Recent Labs   Lab 06/22/22  0416 06/24/22  0545 06/26/22  0538   WBC 6.33 7.24 8.49   HGB 10.2* 10.1* 10.6*   HCT 33.0* 32.3* 34.3*    333 371       Recent Labs   Lab 06/22/22 0416 06/24/22  0545 06/26/22  0538   GRAN 61.6  3.9 64.1  4.6 67.1  5.7   LYMPH 19.9  1.3 19.3  1.4 16.8*  1.4   MONO 9.6  0.6 9.0  0.7 8.6  0.7   EOS 0.5 0.5 0.6*       Recent Labs   Lab 06/23/22  0951 06/24/22  0545 06/26/22  0538 06/27/22  0520   * 133* 137 136   K 4.2 4.4 4.1 4.2   CL 99 100 103 104   CO2 28 24 25 24   BUN 27* 24* 22 23   CREATININE 1.6* 1.5* 1.3 1.4   * 229* 215* 168*   CALCIUM 9.1 8.9 8.7 8.7   ALBUMIN 2.6* 2.6* 2.5*  --    MG  --   --   --  1.7   PHOS 3.2  --   --   --        Recent Labs   Lab 06/22/22 0416 06/24/22  0545 06/26/22  0538   ALKPHOS 76 71 69   ALT 10 10 14   AST 15 18 19   PROT 6.7 6.5 7.1   BILITOT 0.2 0.2 0.2       Recent Labs   Lab 02/23/22  1407 06/15/22  1633   LACTATE  --  1.4   SEDRATE >120* >120*       SARS-CoV2 (COVID-19) Qualitative PCR (no units)   Date Value   03/29/2021 Not Detected   03/15/2021 Not Detected   03/02/2021 Not Detected   02/22/2021 Not Detected   01/29/2021 Not Detected     SARS-CoV-2 RNA, Amplification, Qual (no units)   Date Value   02/03/2021 Negative     POC  Rapid COVID (no units)   Date Value   06/15/2022 Negative   02/18/2021 Negative   12/08/2020 Negative     X-Ray Chest 1 View S/P PICC Line by Nursing  Narrative: EXAMINATION:  XR CHEST 1 VIEW S/P PICC LINE BY NURSING    CLINICAL HISTORY:  s/p picc placement;    TECHNIQUE:  Single frontal view of chest.    COMPARISON:  March 3, 2021    FINDINGS:  Right PICC line now evident, tip superimposing lower superior vena caval soft tissues.  Normal heart size.  Normal pulmonary vasculature.  No focal infiltrates.  No pleural fluid or pneumothorax.  No acute bony findings.  Impression: 1. Right PICC line tip superimposes lower superior vena caval soft tissues.  2. No active cardiac or pulmonary findings    Electronically signed by: John Thomas  Date:    06/24/2022  Time:    12:31      Labs and Imaging within the last 24 hours listed above were reviewed.       Diet: Diet diabetic [facility]; 2000 Calorie; Cardiac (Low Na/Chol)  Diet diabetic  Significant LDAs:   IV Access Type: Peripheral  Urinary Catheter Indication if present: Patient Does Not Have Urinary Catheter  Other Lines/Tubes/Drains:    HIGH RISK CONDITION(S):   Patient has a condition that poses threat to life and bodily function: limb ischemia likely to prevent wound healing      Goals of Care:    Previous admission:  2/17/21  Likely prognosis:  Fair  Code Status: Full Code  Comfort Only: No  Hospice: No  Goals at discharge: remain at home, with physician follow-up    Discharge Planning:  OXANA: 6/27/2022     Code Status: Full Code   Is the patient medically ready for discharge?: Yes    Reason for patient still in hospital (select all that apply): Patient trending condition, Treatment, and Pending disposition  Discharge Plan A: Skilled Nursing Facility

## 2022-06-27 NOTE — PLAN OF CARE
Problem: Adult Inpatient Plan of Care  Goal: Plan of Care Review  Outcome: Ongoing, Progressing  Goal: Patient-Specific Goal (Individualized)  Outcome: Ongoing, Progressing  Goal: Absence of Hospital-Acquired Illness or Injury  Outcome: Ongoing, Progressing  Goal: Optimal Comfort and Wellbeing  Outcome: Ongoing, Progressing  Goal: Readiness for Transition of Care  Outcome: Ongoing, Progressing     Problem: Bariatric Environmental Safety  Goal: Safety Maintained with Care  Outcome: Ongoing, Progressing     Problem: Diabetes Comorbidity  Goal: Blood Glucose Level Within Targeted Range  Outcome: Ongoing, Progressing     Problem: Fluid and Electrolyte Imbalance (Acute Kidney Injury/Impairment)  Goal: Fluid and Electrolyte Balance  Outcome: Ongoing, Progressing     Problem: Oral Intake Inadequate (Acute Kidney Injury/Impairment)  Goal: Optimal Nutrition Intake  Outcome: Ongoing, Progressing     Problem: Renal Function Impairment (Acute Kidney Injury/Impairment)  Goal: Effective Renal Function  Outcome: Ongoing, Progressing     Problem: Impaired Wound Healing  Goal: Optimal Wound Healing  Outcome: Ongoing, Progressing     Problem: Skin Injury Risk Increased  Goal: Skin Health and Integrity  Outcome: Ongoing, Progressing     Problem: Infection  Goal: Absence of Infection Signs and Symptoms  Outcome: Ongoing, Progressing     Problem: Fall Injury Risk  Goal: Absence of Fall and Fall-Related Injury  Outcome: Ongoing, Progressing     Pt AO x4, VSS on RA; meds given without issue; BP monitored and meds given per order; pt rested throughout shift; no issues to report

## 2022-06-27 NOTE — PHYSICIAN QUERY
PT Name: Saji Castañeda  MR #: [id number]     DOCUMENTATION CLARIFICATION     CDS/: Cherrie Jackman RN, CDS   Contact Information: [email or web address]  This form is a permanent document in the medical record.    Query Date: June 27, 2022    By submitting this query, we are merely seeking further clarification of documentation.  Please utilize your independent clinical judgment when addressing the question(s) below.    The Medical Record contains the following:   Indicator Supporting Clinical Findings Location in Medical Record   x Kidney (Renal) Insufficiency Acute renal insufficiency  Per med history, patient no longer taking ARB.  sCr up slightly and vanco level > 20.    vancomycin level improved but sCr has increased.   Hospital Medicine 6/21        [facility] 6/22   x Kidney (Renal) Failure/Injury Mild YNES Infectious disease 6/22    Nephrotoxic Agents     x BUN/Creatinine           GFR BUN    9    CR  0.9    GFR  > 60  BUN  26    CR   1.7   GFR  45.2  BUN   27    CR    1.6  GFR  48.7  BUN  24    CR   1.5   GFR  52.6  BUN  23    CR    1.4  GFR  57.2 Labs 6/16  Labs 6/22  Labs 6/23  Labs 6/24  Labs 6/26   x Urine: Casts         Eosinophils Hyaline casts 1 Urinalysis 6/15    Dehydration      Nausea/Vomiting      Dialysis/CRRT     x Treatment Discontinued ARB, holding diuretic for now. Trial of gentle hydration.   with acute renal insufficiency, vancomycin changed to daptomycin and ceftriaxone discontinued as not needed based on current cultures. Continuing Flagyl    Consider resuming diuretic now that renal insufficiency has resolved. [facility] 6/21    [facility] 6/22        [facility] 6/26    Other         [facility] approved diagnostic criteria for acute kidney injury is based on KDIGO criteria:    An increase in serum creatinine > 0.3mg/dl within 48 hours  OR  Increase in serum creatinine to > 1.5x baseline, which is known or presumed to have occurred within the prior 7  days  OR  Urine volume <0.5 ml/kg/hr for 6 hours       The clinical guidelines noted above are only a system guideline. It does not replace the providers clinical judgment.     Provider, due to a discrepancy in the medical record, please specify the diagnosis associated with the above clinical findings.     [    ] Unspecified Acute Kidney Failure/Injury     [  x   ] Acute Renal Insufficiency  - Consider if SCr rise is transient and normalizes quickly with no efforts at real resuscitation of vital signs and perfusion   [    ] Other (please specify): _______________________________   [  ] Clinically Undetermined       Please document in your progress notes daily for the duration of treatment until resolved and include in your discharge summary.    References:   KDIGO Clinical Practice Guideline for Acute Kidney Injury. (2012, March). Retrieved October 21, 2020, from https://kdigo.org/wp-content/uploads/2016/10/QMIVJ-8263-NMU-Guideline-English.pdf    KVNG Garland MD, HERIBERTO Lemons MD, & VANESSA Ahuja MD. (1960). Renal medullary necrosis [Abstract]. The American Journal of Medicine, 29(1), 132-156. Doi:https://www.sciencedirect.com/science/article/abs/pii/6819676831501063    VANESSA Ramirez MD, & SONAL Gates MD, MS. (2020, June 18). Definition and staging of chronic kidney disease in adults ([id number] HERIBERTO Celestin MD, ScD & [id number] EMILY Cornelius MD, MSc, Eds.). Retrieved October 21, 2020, from https://www.viavoo.[employer]/contents/definition-and-staging-of-chronic-kidney-disease-in-adults?search=ckd%20staging&source=search_result&selectedTitle=1~150&usage_type=default&display_rank=1     JOSEF Chakraborty MD, FACP. (2015, Rina 15). Acute kidney injury revisited. Retrieved October 21, 2020, from https://acphospitalist.org/archives/2015/06/coding-acute-kidney-injury.htm    RUSSEL Molina MD. (2019, July). Renal Cortical Necrosis. Retrieved October 21, 2020, from  https://www.[employer]/professional/genitourinary-disorders/renovascular-disorders/renal-cortical-necrosis    Form No. 51849

## 2022-06-27 NOTE — PROGRESS NOTES
[facility] - Telemetry [facility]  Telemedicine Progress Note    Patient Name: Saji Castañeda  MRN: [id number]  Patient Class: IP- Inpatient   Admission Date: 6/15/2022  Length of Stay: 11 days  Attending Physician: Salma Strauss MD  Primary Care Provider: Mart Escalante MD      Subjective:     Principal Problem:Osteomyelitis of left lower extremity    HPI:  71 y.o. M with PMHx DM2 and PVD with chronic diabetic foot wounds s/p L sided BKA, HTN, asymptomatic bradycardia, CKD 3 and HLD who presented to the ED for worsening R foot pain and drainage. Pt. With known chronic ulceration/osteomylitis to L heel and ID has recommended BKA for definitive therapy, but the patient reports that he has not been interested in amputation. Today, he had his usual f/u appointment with ID, and the patient was referred to the ED for imaging with bone biopsy and potential abx because of worsening drainage and pain. Pt. Denies any current fevers, chills, nausea, or other systemic signs of infection.      Overview/Hospital Course:  Patient admitted to hospital medicine. Podiatry and vascular surgery consulted. Patient underwent bone biopsy and culture. Infectious disease consulted.       Telemedicine  This service was provided by Virtual Visit.    Patient was transferred to [facility] on:  6/18/2022    Chief Complaint   Patient presents with    Wound Check     Sent from podiatry and stated in pain. L toes amputated     The patient location is: 8092/8092 A   Admitted 6/15/2022  2:10 PM  Present with the patient at the time of the telemed/virtual assessment: Telepresenter    Interval History / Events Overnight:   The patient is able to provide adequate history. Additional history was obtained from past medical records. No significant events reported by Nursing. BP remains elevated  Patient complains of nothing specific. Symptoms have been unchanged since yesterday. Associated symptoms include: fatigue.  Symptoms are stable.     Lab test(s) reviewed: hyperglycemia, sCr stable.    Review of Systems   Constitutional:  Negative for fever.   Respiratory:  Negative for shortness of breath.      Objective:     Vital Signs (Most Recent):  Temp: 98.7 °F (37.1 °C) (06/26/22 0823)  Pulse: (!) 47 (06/26/22 0823)  Resp: 16 (06/26/22 0823)  BP: (!) 170/72 (06/26/22 0823)  SpO2: 95 % (06/26/22 0823)   Vital Signs (24h Range):  Temp:  [98 °F (36.7 °C)-98.7 °F (37.1 °C)] 98.7 °F (37.1 °C)  Pulse:  [43-53] 47  Resp:  [16-19] 16  SpO2:  [92 %-96 %] 95 %  BP: (153-188)/(67-77) 170/72     Weight: 124.3 kg (274 lb 0.5 oz)  Body mass index is 40.47 kg/m².    Intake/Output Summary (Last 24 hours) at 6/26/2022 1108  Last data filed at 6/26/2022 0800  Gross per 24 hour   Intake 350 ml   Output 1000 ml   Net -650 ml        Physical Exam  Constitutional:       General: He is not in acute distress.     Appearance: Normal appearance.   Eyes:      General: Lids are normal. No scleral icterus.        Right eye: No discharge.         Left eye: No discharge.      Conjunctiva/sclera: Conjunctivae normal.   Neck:      Trachea: Phonation normal.   Cardiovascular:      Rate and Rhythm: Bradycardia present.      Comments: Monitor / Vital signs reviewed at time of visit  Pulmonary:      Effort: Pulmonary effort is normal. No tachypnea, accessory muscle usage or respiratory distress.   Abdominal:      General: There is no distension.   Musculoskeletal:      Left foot: Deformity (s/p partial amputation) present.   Skin:     Coloration: Skin is not cyanotic.   Neurological:      Mental Status: He is alert. He is not disoriented.   Psychiatric:         Attention and Perception: Attention normal.         Behavior: Behavior is cooperative.       Significant Labs:   Recent Labs   Lab 06/16/22  1100   HGBA1C 9.5*       Recent Labs   Lab 06/25/22  1527 06/25/22  2208 06/26/22  0825   POCTGLUCOSE 196* 149* 205*       Recent Labs   Lab 06/22/22  0416 06/24/22  0545  06/26/22  0538   WBC 6.33 7.24 8.49   HGB 10.2* 10.1* 10.6*   HCT 33.0* 32.3* 34.3*    333 371       Recent Labs   Lab 06/22/22  0416 06/24/22  0545 06/26/22  0538   GRAN 61.6  3.9 64.1  4.6 67.1  5.7   LYMPH 19.9  1.3 19.3  1.4 16.8*  1.4   MONO 9.6  0.6 9.0  0.7 8.6  0.7   EOS 0.5 0.5 0.6*       Recent Labs   Lab 06/23/22  0951 06/24/22  0545 06/26/22  0538   * 133* 137   K 4.2 4.4 4.1   CL 99 100 103   CO2 28 24 25   BUN 27* 24* 22   CREATININE 1.6* 1.5* 1.3   * 229* 215*   CALCIUM 9.1 8.9 8.7   ALBUMIN 2.6* 2.6* 2.5*   PHOS 3.2  --   --        Recent Labs   Lab 06/22/22  0416 06/24/22  0545 06/26/22  0538   ALKPHOS 76 71 69   ALT 10 10 14   AST 15 18 19   PROT 6.7 6.5 7.1   BILITOT 0.2 0.2 0.2       Recent Labs   Lab 02/23/22  1407 06/15/22  1633   LACTATE  --  1.4   SEDRATE >120* >120*       SARS-CoV2 (COVID-19) Qualitative PCR (no units)   Date Value   03/29/2021 Not Detected   03/15/2021 Not Detected   03/02/2021 Not Detected   02/22/2021 Not Detected   01/29/2021 Not Detected     SARS-CoV-2 RNA, Amplification, Qual (no units)   Date Value   02/03/2021 Negative     POC Rapid COVID (no units)   Date Value   06/15/2022 Negative   02/18/2021 Negative   12/08/2020 Negative     X-Ray Chest 1 View S/P PICC Line by Nursing  Narrative: EXAMINATION:  XR CHEST 1 VIEW S/P PICC LINE BY NURSING    CLINICAL HISTORY:  s/p picc placement;    TECHNIQUE:  Single frontal view of chest.    COMPARISON:  March 3, 2021    FINDINGS:  Right PICC line now evident, tip superimposing lower superior vena caval soft tissues.  Normal heart size.  Normal pulmonary vasculature.  No focal infiltrates.  No pleural fluid or pneumothorax.  No acute bony findings.  Impression: 1. Right PICC line tip superimposes lower superior vena caval soft tissues.  2. No active cardiac or pulmonary findings    Electronically signed by: John Thomas  Date:    06/24/2022  Time:    12:31      Labs and Imaging within the last 24 hours  listed above were reviewed.       Diet: Diet diabetic [facility]; 2000 Calorie; Cardiac (Low Na/Chol)  Diet diabetic  Significant LDAs:   IV Access Type: Peripheral  Urinary Catheter Indication if present: Patient Does Not Have Urinary Catheter  Other Lines/Tubes/Drains:    HIGH RISK CONDITION(S):   Patient has a condition that poses threat to life and bodily function: limb ischemia likely to prevent wound healing      Goals of Care:    Previous admission:  2/17/21  Likely prognosis:  Fair  Code Status: Full Code  Comfort Only: No  Hospice: No  Goals at discharge: remain at home, with physician follow-up    Discharge Planning:  OXANA: 6/27/2022     Code Status: Full Code   Is the patient medically ready for discharge?: Yes    Reason for patient still in hospital (select all that apply): Patient trending condition, Treatment, and Pending disposition  Discharge Plan A: Skilled Nursing Facility       Assessment/Plan:      * Osteomyelitis of left lower extremity  -With acute on chronic osteomyleitis of LLE including heel with purulent foul smelling drainage  -Podiatry consulted, Not septic appearing. S/p bone biopsy.  -ID consulted. Cultures growing Staph aureus and GBS   - L foot wound anaerobic culture growing Prevotella and Porphyromonas Somerae. PO Flagyl added.    - L foot bone culture growing Staph species, continued IV ceftriaxone and vancomycin (per Pharmacy dosing).  - with acute renal insufficiency, vancomycin changed to daptomycin and ceftriaxone discontinued as not needed based on current cultures. Continuing Flagyl  -Continuing daptomycin and Flagyl    Acute renal insufficiency  Per med history, patient no longer taking ARB.  sCr up slightly and vanco level > 20.  Discontinued ARB, holding diuretic for now. Trial of gentle hydration ineffective.  Discontinued vancomycin.  sCr improved     Peripheral arterial disease  -PVD contributing to current non-healing foot wound. Arterial US shows LLE  atherosclerotic disease, areas of high-grade stenosis within the L popliteal artery  -Continue home statin, ASA  -Vascular Surgery consulted - patient considering surgery (BKA); seems reluctant primarily due to the time he expects it to take to obtain a prosthetic limb. Wants to try conservative management with antibiotics.  -Plan for SNF for IV antibiotics  - Follow up with Podiatry, ID and Vascular Surgery    Asymptomatic Mobitz (type) I (Wenckebach's) atrioventricular block  -Bradycardic in ED but asymptomatic, chronic issue  -Repeat EKG 6/19:  BPM 44 Normal sinus rhythm - AV block, 2:1 - Nonspecific T wave abnormality   -avoid BB and chronotropic CCB    Chronic kidney disease, stage III (moderate)  -sCr baseline 1.1  -Continue to monitor  -sCr remained > baseline; near to baseline 6/26    Type 2 diabetes, uncontrolled, with neuropathy  -Last A1c 2020, repeat > 9%. Per patient, taking 68 units Lantus QHS along with metformin  mg and glipizide 10 mg daily (Possibly, he is not sure)  -Per Pharm med history: patient is no longer taking insulin  -Mildly hypoglycemic on admit with BG 63  -decreased dose of basal insulin to 15 units Levemir, Titrate up as needed. Added prandial insulin 6/19  -worsening hyperglycemia as oral agents wash out. Levemir increased to BID and aspart increased with meals.  - doses adjusted 6/21; increased for 6/23, and again for 6/25    Essential hypertension  Continued losartan 100mg and HCTZ 25 mg, with hydralazine 25 mg PO q8h PRN  Added nifedipine 6/18  Consulted PharmD for med history: patient reports no longer taking isosorbide-hydralazine, losartan, and losartan-HCTZ  Discontinued ARB, held HCTZ due to increasing sCr. Continued nifedipine for BP control.  Consider resuming diuretic now that renal insufficiency has resolved.        Active Hospital Problems    Diagnosis  POA    *Osteomyelitis of left lower extremity [M86.9]  Yes    Acute renal insufficiency [N28.9]  No    Foot  pain, left [M79.672]  Yes    Peripheral arterial disease [I73.9]  Yes     Chronic    Asymptomatic Mobitz (type) I (Wenckebach's) atrioventricular block [I44.1]  Yes    Chronic kidney disease, stage III (moderate) [N18.30]  Yes    Type 2 diabetes, uncontrolled, with neuropathy [E11.40, E11.65]  Yes     Chronic    Essential hypertension [I10]  Yes     Chronic      Resolved Hospital Problems   No resolved problems to display.       Inpatient Medications Prescribed for Management of Current Problems:     Scheduled Meds:    aspirin  81 mg Oral Daily    atorvastatin  80 mg Oral Daily    DAPTOmycin (CUBICIN)  IV  8 mg/kg Intravenous Q24H    gabapentin  200 mg Oral BID    [START ON 6/27/2022] hydroCHLOROthiazide  12.5 mg Oral Daily    insulin aspart U-100  20 Units Subcutaneous TIDWM    insulin detemir U-100  25 Units Subcutaneous BID    Lactobacillus rhamnosus GG  1 capsule Oral BID    metroNIDAZOLE  500 mg Oral Q8H    mupirocin   Nasal BID    NIFEdipine  90 mg Oral Daily    sodium chloride 0.9%  10 mL Intravenous Q6H    tamsulosin  0.4 mg Oral Daily     Continuous Infusions:   As Needed: acetaminophen, albuterol-ipratropium, aluminum-magnesium hydroxide-simethicone, dextrose 10%, dextrose 10%, glucagon (human recombinant), glucose, glucose, hydrALAZINE, insulin aspart U-100, melatonin, naloxone, ondansetron, oxyCODONE-acetaminophen, prochlorperazine, sars-cov-2 (covid-19), sodium chloride 0.9%, Flushing PICC Protocol **AND** sodium chloride 0.9% **AND** sodium chloride 0.9%    VTE Risk Mitigation (From admission, onward)         Ordered     IP VTE HIGH RISK PATIENT  Once         06/15/22 1957     Place sequential compression device  Until discontinued         06/15/22 1957              I have assessed these finding virtually using telemed platform and with assistance of bedside nurse     The attending portion of this evaluation, treatment, and documentation was performed per Salma Strauss MD via  Telemedicine AudioVisual using the secure TrustedAd software platform with 2 way audio/video. The provider was located off-site and the patient is located in the hospital. The aforementioned video software was utilized to document the relevant history and physical exam.    Salma Strauss MD  Department of Hospital Medicine   [facility] - Telemetry Stepdown

## 2022-06-27 NOTE — ASSESSMENT & PLAN NOTE
Continued losartan 100mg and HCTZ 25 mg, with hydralazine 25 mg PO q8h PRN  Added nifedipine 6/18  Consulted PharmD for med history: patient reports no longer taking isosorbide-hydralazine, losartan, and losartan-HCTZ  Discontinued ARB, held HCTZ due to increasing sCr. Continued nifedipine for BP control.  Consider resuming diuretic now that renal insufficiency has resolved.

## 2022-06-27 NOTE — PLAN OF CARE
06/27/22 0942   Post-Acute Status   Post-Acute Authorization Placement   Post-Acute Placement Status Pending payor review/awaiting authorization (if required)   Spoke with Brandy ([phone]) at [facility] who reports SNF auth is still pending.    10:28 AM  Insurance auth escalated to leadership.    2:17 PM  Received call from Brandy at [facility] who reports [employer] denied the SNF request.    3:03 PM  Met with patient at bedside and provided the [employer] denial letter. Patient reports his sister (who he lives with) is also in the hospital and cannot care for him at home. Patient reports he does not feel he will be able to administer his own IV antibiotics and still needs more PT/OT prior to discharging home safely. QUINCY assisted patient in dialing the fast appeal phone number. SW will follow up with patient at a later time to check status of appeal.    4:43 PM  Spoke with patient who reports he was unable to reach a live individual on the fast appeals line, only robot answering services. QUINCY discussed the option of appointing SW as his representative to navigate the fast appeals process. Patient is agreeable. SW and patient completed the Human appointment of representative form. SW faxed the form to [employer] (Ph: [phone] / Fax: [phone]).    Toshia Dennis, LORNA  [facility]  Ext. [phone]

## 2022-06-28 LAB
ALBUMIN SERPL BCP-MCNC: 2.6 G/DL (ref 3.5–5.2)
ALP SERPL-CCNC: 66 U/L (ref 55–135)
ALT SERPL W/O P-5'-P-CCNC: 14 U/L (ref 10–44)
ANION GAP SERPL CALC-SCNC: 10 MMOL/L (ref 8–16)
AST SERPL-CCNC: 17 U/L (ref 10–40)
BASOPHILS # BLD AUTO: 0.05 K/UL (ref 0–0.2)
BASOPHILS NFR BLD: 0.6 % (ref 0–1.9)
BILIRUB SERPL-MCNC: 0.3 MG/DL (ref 0.1–1)
BUN SERPL-MCNC: 22 MG/DL (ref 8–23)
CALCIUM SERPL-MCNC: 8.8 MG/DL (ref 8.7–10.5)
CHLORIDE SERPL-SCNC: 104 MMOL/L (ref 95–110)
CO2 SERPL-SCNC: 23 MMOL/L (ref 23–29)
CREAT SERPL-MCNC: 1.4 MG/DL (ref 0.5–1.4)
DIFFERENTIAL METHOD: ABNORMAL
EOSINOPHIL # BLD AUTO: 0.7 K/UL (ref 0–0.5)
EOSINOPHIL NFR BLD: 8.5 % (ref 0–8)
ERYTHROCYTE [DISTWIDTH] IN BLOOD BY AUTOMATED COUNT: 16.8 % (ref 11.5–14.5)
EST. GFR  (AFRICAN AMERICAN): 57.2 ML/MIN/1.73 M^2
EST. GFR  (NON AFRICAN AMERICAN): 49.5 ML/MIN/1.73 M^2
GLUCOSE SERPL-MCNC: 174 MG/DL (ref 70–110)
HCT VFR BLD AUTO: 34 % (ref 40–54)
HGB BLD-MCNC: 10.8 G/DL (ref 14–18)
IMM GRANULOCYTES # BLD AUTO: 0.02 K/UL (ref 0–0.04)
IMM GRANULOCYTES NFR BLD AUTO: 0.2 % (ref 0–0.5)
LYMPHOCYTES # BLD AUTO: 1.5 K/UL (ref 1–4.8)
LYMPHOCYTES NFR BLD: 17.6 % (ref 18–48)
MCH RBC QN AUTO: 25.5 PG (ref 27–31)
MCHC RBC AUTO-ENTMCNC: 31.8 G/DL (ref 32–36)
MCV RBC AUTO: 80 FL (ref 82–98)
MONOCYTES # BLD AUTO: 0.8 K/UL (ref 0.3–1)
MONOCYTES NFR BLD: 8.7 % (ref 4–15)
NEUTROPHILS # BLD AUTO: 5.6 K/UL (ref 1.8–7.7)
NEUTROPHILS NFR BLD: 64.4 % (ref 38–73)
NRBC BLD-RTO: 0 /100 WBC
PLATELET # BLD AUTO: 364 K/UL (ref 150–450)
PMV BLD AUTO: 9.9 FL (ref 9.2–12.9)
POCT GLUCOSE: 187 MG/DL (ref 70–110)
POCT GLUCOSE: 218 MG/DL (ref 70–110)
POCT GLUCOSE: 230 MG/DL (ref 70–110)
POCT GLUCOSE: 318 MG/DL (ref 70–110)
POTASSIUM SERPL-SCNC: 4.5 MMOL/L (ref 3.5–5.1)
PROT SERPL-MCNC: 6.6 G/DL (ref 6–8.4)
RBC # BLD AUTO: 4.23 M/UL (ref 4.6–6.2)
SODIUM SERPL-SCNC: 137 MMOL/L (ref 136–145)
WBC # BLD AUTO: 8.71 K/UL (ref 3.9–12.7)

## 2022-06-28 PROCEDURE — 20600001 HC STEP DOWN PRIVATE ROOM

## 2022-06-28 PROCEDURE — 63600175 PHARM REV CODE 636 W HCPCS: Performed by: PHYSICIAN ASSISTANT

## 2022-06-28 PROCEDURE — 97530 THERAPEUTIC ACTIVITIES: CPT

## 2022-06-28 PROCEDURE — 80053 COMPREHEN METABOLIC PANEL: CPT | Performed by: STUDENT IN AN ORGANIZED HEALTH CARE EDUCATION/TRAINING PROGRAM

## 2022-06-28 PROCEDURE — 99233 PR SUBSEQUENT HOSPITAL CARE,LEVL III: ICD-10-PCS | Mod: 95,,, | Performed by: INTERNAL MEDICINE

## 2022-06-28 PROCEDURE — 25000003 PHARM REV CODE 250: Performed by: REGISTERED NURSE

## 2022-06-28 PROCEDURE — 27000207 HC ISOLATION

## 2022-06-28 PROCEDURE — 85025 COMPLETE CBC W/AUTO DIFF WBC: CPT | Performed by: STUDENT IN AN ORGANIZED HEALTH CARE EDUCATION/TRAINING PROGRAM

## 2022-06-28 PROCEDURE — A4216 STERILE WATER/SALINE, 10 ML: HCPCS | Performed by: INTERNAL MEDICINE

## 2022-06-28 PROCEDURE — 99233 SBSQ HOSP IP/OBS HIGH 50: CPT | Mod: 95,,, | Performed by: INTERNAL MEDICINE

## 2022-06-28 PROCEDURE — 25000003 PHARM REV CODE 250: Performed by: NURSE PRACTITIONER

## 2022-06-28 PROCEDURE — 36415 COLL VENOUS BLD VENIPUNCTURE: CPT | Performed by: STUDENT IN AN ORGANIZED HEALTH CARE EDUCATION/TRAINING PROGRAM

## 2022-06-28 PROCEDURE — 25000003 PHARM REV CODE 250: Performed by: INTERNAL MEDICINE

## 2022-06-28 PROCEDURE — 25000003 PHARM REV CODE 250: Performed by: PHYSICIAN ASSISTANT

## 2022-06-28 PROCEDURE — 25000003 PHARM REV CODE 250: Performed by: HOSPITALIST

## 2022-06-28 RX ORDER — HYDRALAZINE HYDROCHLORIDE 25 MG/1
25 TABLET, FILM COATED ORAL ONCE
Status: COMPLETED | OUTPATIENT
Start: 2022-06-28 | End: 2022-06-28

## 2022-06-28 RX ORDER — HYDRALAZINE HYDROCHLORIDE 50 MG/1
50 TABLET, FILM COATED ORAL EVERY 8 HOURS
Status: DISCONTINUED | OUTPATIENT
Start: 2022-06-29 | End: 2022-06-29

## 2022-06-28 RX ADMIN — GABAPENTIN 200 MG: 100 CAPSULE ORAL at 08:06

## 2022-06-28 RX ADMIN — Medication 10 ML: at 12:06

## 2022-06-28 RX ADMIN — MUPIROCIN: 20 OINTMENT TOPICAL at 09:06

## 2022-06-28 RX ADMIN — INSULIN ASPART 20 UNITS: 100 INJECTION, SOLUTION INTRAVENOUS; SUBCUTANEOUS at 05:06

## 2022-06-28 RX ADMIN — ASPIRIN 81 MG: 81 TABLET, COATED ORAL at 08:06

## 2022-06-28 RX ADMIN — HYDRALAZINE HYDROCHLORIDE 25 MG: 25 TABLET, FILM COATED ORAL at 10:06

## 2022-06-28 RX ADMIN — METRONIDAZOLE 500 MG: 500 TABLET ORAL at 06:06

## 2022-06-28 RX ADMIN — GABAPENTIN 200 MG: 100 CAPSULE ORAL at 09:06

## 2022-06-28 RX ADMIN — INSULIN ASPART 20 UNITS: 100 INJECTION, SOLUTION INTRAVENOUS; SUBCUTANEOUS at 11:06

## 2022-06-28 RX ADMIN — NIFEDIPINE 90 MG: 30 TABLET, FILM COATED, EXTENDED RELEASE ORAL at 08:06

## 2022-06-28 RX ADMIN — Medication 10 ML: at 06:06

## 2022-06-28 RX ADMIN — METRONIDAZOLE 500 MG: 500 TABLET ORAL at 01:06

## 2022-06-28 RX ADMIN — Medication 1 CAPSULE: at 09:06

## 2022-06-28 RX ADMIN — HYDROCHLOROTHIAZIDE 12.5 MG: 12.5 TABLET ORAL at 08:06

## 2022-06-28 RX ADMIN — Medication 1 CAPSULE: at 08:06

## 2022-06-28 RX ADMIN — DAPTOMYCIN 995 MG: 350 INJECTION, POWDER, LYOPHILIZED, FOR SOLUTION INTRAVENOUS at 05:06

## 2022-06-28 RX ADMIN — INSULIN ASPART 1 UNITS: 100 INJECTION, SOLUTION INTRAVENOUS; SUBCUTANEOUS at 09:06

## 2022-06-28 RX ADMIN — INSULIN ASPART 20 UNITS: 100 INJECTION, SOLUTION INTRAVENOUS; SUBCUTANEOUS at 08:06

## 2022-06-28 RX ADMIN — Medication 10 ML: at 05:06

## 2022-06-28 RX ADMIN — HYDRALAZINE HYDROCHLORIDE 50 MG: 50 TABLET ORAL at 11:06

## 2022-06-28 RX ADMIN — TAMSULOSIN HYDROCHLORIDE 0.4 MG: 0.4 CAPSULE ORAL at 08:06

## 2022-06-28 RX ADMIN — HYDRALAZINE HYDROCHLORIDE 50 MG: 50 TABLET ORAL at 04:06

## 2022-06-28 RX ADMIN — METRONIDAZOLE 500 MG: 500 TABLET ORAL at 09:06

## 2022-06-28 RX ADMIN — INSULIN ASPART 2 UNITS: 100 INJECTION, SOLUTION INTRAVENOUS; SUBCUTANEOUS at 05:06

## 2022-06-28 RX ADMIN — ATORVASTATIN CALCIUM 80 MG: 20 TABLET, FILM COATED ORAL at 08:06

## 2022-06-28 RX ADMIN — Medication 10 ML: at 11:06

## 2022-06-28 RX ADMIN — HYDRALAZINE HYDROCHLORIDE 50 MG: 50 TABLET ORAL at 07:06

## 2022-06-28 RX ADMIN — INSULIN ASPART 4 UNITS: 100 INJECTION, SOLUTION INTRAVENOUS; SUBCUTANEOUS at 11:06

## 2022-06-28 NOTE — PLAN OF CARE
Problem: Occupational Therapy  Goal: Occupational Therapy Goal  Description: Goals to be met by: 07-02-22     Patient will increase functional independence with ADLs by performing:    UE Dressing with Set-up Assistance.  LE Dressing with Minimal Assistance.  Grooming while seated with Set-up Assistance.  Toileting from bedside commode with Stand-by Assistance for hygiene and clothing management.   Supine to sit with Stand-by Assistance. Goal met 6/28  Stand pivot transfers with Stand-by Assistance with RW and PWB in left heel only  Toilet transfer to bedside commode with Stand-by Assistance.  Pt. To perform scoot pivot transfer with Supervision to and from the wheelchair    Outcome: Ongoing, Progressing   Pts goals remain appropriate.

## 2022-06-28 NOTE — PROGRESS NOTES
[facility] - Tele[facility]  Telemedicine Progress Note    Patient Name: Saji Castañeda  MRN: [id number]  Patient Class: IP- Inpatient   Admission Date: 6/15/2022  Length of Stay: 12 days  Attending Physician: Salma Strauss MD  Primary Care Provider: Mart Escalante MD      Subjective:     Principal Problem:Osteomyelitis of left lower extremity    HPI:  71 y.o. M with PMHx DM2 and PVD with chronic diabetic foot wounds s/p L sided BKA, HTN, asymptomatic bradycardia, CKD 3 and HLD who presented to the ED for worsening R foot pain and drainage. Pt. With known chronic ulceration/osteomylitis to L heel and ID has recommended BKA for definitive therapy, but the patient reports that he has not been interested in amputation. Today, he had his usual f/u appointment with ID, and the patient was referred to the ED for imaging with bone biopsy and potential abx because of worsening drainage and pain. Pt. Denies any current fevers, chills, nausea, or other systemic signs of infection.      Overview/Hospital Course:  Patient admitted to hospital medicine. Podiatry and vascular surgery consulted. Patient underwent bone biopsy and culture. Infectious disease consulted.       Telemedicine  This service was provided by Virtual Visit.    Patient was transferred to [facility] on:  6/18/2022    Chief Complaint   Patient presents with    Wound Check     Sent from podiatry and stated in pain. L toes amputated     The patient location is: 8092/8092 A   Admitted 6/15/2022  2:10 PM  Present with the patient at the time of the telemed/virtual assessment: Telepresenter    Interval History / Events Overnight:   The patient is able to provide adequate history. Additional history was obtained from past medical records. No significant events reported by Nursing. BP improved slightly  Patient complains of nothing specific. Symptoms have been unchanged since yesterday. Associated symptoms include: fatigue.  Symptoms are stable.     Lab test(s) reviewed: hyperglycemia, sCr stable.    Review of Systems   Constitutional:  Negative for fever.   Respiratory:  Negative for shortness of breath.      Objective:     Vital Signs (Most Recent):  Temp: 98.2 °F (36.8 °C) (06/27/22 0841)  Pulse: 60 (06/27/22 0841)  Resp: 20 (06/27/22 0841)  BP: (!) 159/70 (06/27/22 0903)  SpO2: 97 % (06/27/22 0841)   Vital Signs (24h Range):  Temp:  [98.2 °F (36.8 °C)-98.8 °F (37.1 °C)] 98.2 °F (36.8 °C)  Pulse:  [40-74] 60  Resp:  [18-23] 20  SpO2:  [94 %-97 %] 97 %  BP: (137-185)/(66-78) 159/70     Weight: 124.3 kg (274 lb 0.5 oz)  Body mass index is 40.47 kg/m².    Intake/Output Summary (Last 24 hours) at 6/27/2022 1132  Last data filed at 6/26/2022 2100  Gross per 24 hour   Intake 400 ml   Output 150 ml   Net 250 ml        Physical Exam  Constitutional:       General: He is not in acute distress.     Appearance: Normal appearance.   Eyes:      General: Lids are normal. No scleral icterus.        Right eye: No discharge.         Left eye: No discharge.      Conjunctiva/sclera: Conjunctivae normal.   Neck:      Trachea: Phonation normal.   Cardiovascular:      Rate and Rhythm: Bradycardia present.      Comments: Monitor / Vital signs reviewed at time of visit  Pulmonary:      Effort: Pulmonary effort is normal. No tachypnea, accessory muscle usage or respiratory distress.   Abdominal:      General: There is no distension.   Musculoskeletal:      Left foot: Deformity (s/p partial amputation) present.   Skin:     Coloration: Skin is not cyanotic.   Neurological:      Mental Status: He is alert. He is not disoriented.   Psychiatric:         Attention and Perception: Attention normal.         Behavior: Behavior is cooperative.       Significant Labs:   Recent Labs   Lab 06/16/22  1100   HGBA1C 9.5*       Recent Labs   Lab 06/26/22  1808 06/26/22  2119 06/27/22  0844   POCTGLUCOSE 179* 175* 228*       Recent Labs   Lab 06/22/22  0416 06/24/22  0545  06/26/22  0538   WBC 6.33 7.24 8.49   HGB 10.2* 10.1* 10.6*   HCT 33.0* 32.3* 34.3*    333 371       Recent Labs   Lab 06/22/22  0416 06/24/22  0545 06/26/22  0538   GRAN 61.6  3.9 64.1  4.6 67.1  5.7   LYMPH 19.9  1.3 19.3  1.4 16.8*  1.4   MONO 9.6  0.6 9.0  0.7 8.6  0.7   EOS 0.5 0.5 0.6*       Recent Labs   Lab 06/23/22  0951 06/24/22  0545 06/26/22  0538 06/27/22  0520   * 133* 137 136   K 4.2 4.4 4.1 4.2   CL 99 100 103 104   CO2 28 24 25 24   BUN 27* 24* 22 23   CREATININE 1.6* 1.5* 1.3 1.4   * 229* 215* 168*   CALCIUM 9.1 8.9 8.7 8.7   ALBUMIN 2.6* 2.6* 2.5*  --    MG  --   --   --  1.7   PHOS 3.2  --   --   --        Recent Labs   Lab 06/22/22  0416 06/24/22  0545 06/26/22  0538   ALKPHOS 76 71 69   ALT 10 10 14   AST 15 18 19   PROT 6.7 6.5 7.1   BILITOT 0.2 0.2 0.2       Recent Labs   Lab 02/23/22  1407 06/15/22  1633   LACTATE  --  1.4   SEDRATE >120* >120*       SARS-CoV2 (COVID-19) Qualitative PCR (no units)   Date Value   03/29/2021 Not Detected   03/15/2021 Not Detected   03/02/2021 Not Detected   02/22/2021 Not Detected   01/29/2021 Not Detected     SARS-CoV-2 RNA, Amplification, Qual (no units)   Date Value   02/03/2021 Negative     POC Rapid COVID (no units)   Date Value   06/15/2022 Negative   02/18/2021 Negative   12/08/2020 Negative     X-Ray Chest 1 View S/P PICC Line by Nursing  Narrative: EXAMINATION:  XR CHEST 1 VIEW S/P PICC LINE BY NURSING    CLINICAL HISTORY:  s/p picc placement;    TECHNIQUE:  Single frontal view of chest.    COMPARISON:  March 3, 2021    FINDINGS:  Right PICC line now evident, tip superimposing lower superior vena caval soft tissues.  Normal heart size.  Normal pulmonary vasculature.  No focal infiltrates.  No pleural fluid or pneumothorax.  No acute bony findings.  Impression: 1. Right PICC line tip superimposes lower superior vena caval soft tissues.  2. No active cardiac or pulmonary findings    Electronically signed by: John  William  Date:    06/24/2022  Time:    12:31      Labs and Imaging within the last 24 hours listed above were reviewed.       Diet: Diet diabetic [facility]; 2000 Calorie; Cardiac (Low Na/Chol)  Diet diabetic  Significant LDAs:   IV Access Type: Peripheral  Urinary Catheter Indication if present: Patient Does Not Have Urinary Catheter  Other Lines/Tubes/Drains:    HIGH RISK CONDITION(S):   Patient has a condition that poses threat to life and bodily function: limb ischemia likely to prevent wound healing      Goals of Care:    Previous admission:  2/17/21  Likely prognosis:  Fair  Code Status: Full Code  Comfort Only: No  Hospice: No  Goals at discharge: remain at home, with physician follow-up    Discharge Planning:  OXANA: 6/27/2022     Code Status: Full Code   Is the patient medically ready for discharge?: Yes    Reason for patient still in hospital (select all that apply): Patient trending condition, Treatment, and Pending disposition  Discharge Plan A: Skilled Nursing Facility       Assessment/Plan:      * Osteomyelitis of left lower extremity  -With acute on chronic osteomyleitis of LLE including heel with purulent foul smelling drainage  -Podiatry consulted, Not septic appearing. S/p bone biopsy.  -ID consulted. Cultures growing Staph aureus and GBS   - L foot wound anaerobic culture growing Prevotella and Porphyromonas Somerae. PO Flagyl added.    - L foot bone culture growing Staph species, continued IV ceftriaxone and vancomycin (per Pharmacy dosing).  - with acute renal insufficiency, vancomycin changed to daptomycin and ceftriaxone discontinued as not needed based on current cultures. Continuing Flagyl  -Continuing daptomycin and Flagyl; Estimated end date of daptomycin: 7/28/22. Estimated end date of Flagyl: 7/4/22    Acute renal insufficiency  Per med history, patient no longer taking ARB.  sCr up slightly and vanco level > 20.  Discontinued ARB, holding diuretic for now. Trial of gentle hydration  ineffective.  Discontinued vancomycin.  sCr improved     Peripheral arterial disease  -PVD contributing to current non-healing foot wound. Arterial US shows LLE atherosclerotic disease, areas of high-grade stenosis within the L popliteal artery  -Continue home statin, ASA  -Vascular Surgery consulted - patient considering surgery (BKA); seems reluctant primarily due to the time he expects it to take to obtain a prosthetic limb. Wants to try conservative management with antibiotics.  -Plan for SNF for IV antibiotics, OT/PT  - Follow up with Podiatry, ID and Vascular Surgery    Asymptomatic Mobitz (type) I (Wenckebach's) atrioventricular block  -Bradycardic in ED but asymptomatic, chronic issue  -Repeat EKG 6/19:  BPM 44 Normal sinus rhythm - AV block, 2:1 - Nonspecific T wave abnormality   -avoid BB and chronotropic CCB    Chronic kidney disease, stage III (moderate)  -sCr baseline 1.1  -Continue to monitor  -sCr remained > baseline; near to baseline 6/26    Type 2 diabetes, uncontrolled, with neuropathy  -Last A1c 2020, repeat > 9%. Per patient, taking 68 units Lantus QHS along with metformin  mg and glipizide 10 mg daily (Possibly, he is not sure)  -Per Pharm med history: patient is no longer taking insulin  -Mildly hypoglycemic on admit with BG 63  -decreased dose of basal insulin to 15 units Levemir, Titrate up as needed. Added prandial insulin 6/19  -worsening hyperglycemia as oral agents wash out. Levemir increased to BID and aspart increased with meals.  - doses adjusted 6/21; increased for 6/23, and again for 6/25  - some hyperglycemia due to dietary indiscretion; decreasing basal insulin 6/27    Essential hypertension  Continued losartan 100mg and HCTZ 25 mg, with hydralazine 25 mg PO q8h PRN  Added nifedipine 6/18  Consulted PharmD for med history: patient reports no longer taking isosorbide-hydralazine, losartan, and losartan-HCTZ  Discontinued ARB, held HCTZ due to increasing sCr. Continued  nifedipine for BP control.  Resuming diuretic now that renal insufficiency has resolved.        Active Hospital Problems    Diagnosis  POA    *Osteomyelitis of left lower extremity [M86.9]  Yes    Acute renal insufficiency [N28.9]  No    Foot pain, left [M79.672]  Yes    Peripheral arterial disease [I73.9]  Yes     Chronic    Asymptomatic Mobitz (type) I (Wenckebach's) atrioventricular block [I44.1]  Yes    Chronic kidney disease, stage III (moderate) [N18.30]  Yes    Type 2 diabetes, uncontrolled, with neuropathy [E11.40, E11.65]  Yes     Chronic    Essential hypertension [I10]  Yes     Chronic      Resolved Hospital Problems   No resolved problems to display.       Inpatient Medications Prescribed for Management of Current Problems:     Scheduled Meds:    aspirin  81 mg Oral Daily    atorvastatin  80 mg Oral Daily    DAPTOmycin (CUBICIN)  IV  8 mg/kg Intravenous Q24H    gabapentin  200 mg Oral BID    hydroCHLOROthiazide  12.5 mg Oral Daily    insulin aspart U-100  20 Units Subcutaneous TIDWM    insulin detemir U-100  20 Units Subcutaneous BID    Lactobacillus rhamnosus GG  1 capsule Oral BID    metroNIDAZOLE  500 mg Oral Q8H    mupirocin   Nasal BID    NIFEdipine  90 mg Oral Daily    sodium chloride 0.9%  10 mL Intravenous Q6H    tamsulosin  0.4 mg Oral Daily     Continuous Infusions:   As Needed: acetaminophen, albuterol-ipratropium, aluminum-magnesium hydroxide-simethicone, dextrose 10%, dextrose 10%, glucagon (human recombinant), glucose, glucose, hydrALAZINE, insulin aspart U-100, melatonin, naloxone, ondansetron, oxyCODONE-acetaminophen, prochlorperazine, sars-cov-2 (covid-19), sodium chloride 0.9%, Flushing PICC Protocol **AND** sodium chloride 0.9% **AND** sodium chloride 0.9%    VTE Risk Mitigation (From admission, onward)         Ordered     IP VTE HIGH RISK PATIENT  Once         06/15/22 1957     Place sequential compression device  Until discontinued         06/15/22 1957               I have assessed these finding virtually using telemed platform and with assistance of bedside nurse     The attending portion of this evaluation, treatment, and documentation was performed per Salma Strauss MD via Telemedicine AudioVisual using the secure [employer] software platform with 2 way audio/video. The provider was located off-site and the patient is located in the hospital. The aforementioned video software was utilized to document the relevant history and physical exam    Salma Strauss MD  Department of Hospital Medicine   [facility] - Telemetry Stepdown

## 2022-06-28 NOTE — PLAN OF CARE
Problem: Adult Inpatient Plan of Care  Goal: Plan of Care Review  Outcome: Ongoing, Progressing  Goal: Patient-Specific Goal (Individualized)  Outcome: Ongoing, Progressing  Goal: Optimal Comfort and Wellbeing  Outcome: Ongoing, Progressing     Problem: Diabetes Comorbidity  Goal: Blood Glucose Level Within Targeted Range  Outcome: Ongoing, Progressing  Intervention: Monitor and Manage Glycemia  Flowsheets (Taken 6/28/2022 8189)  Glycemic Management:   blood glucose monitored   supplemental insulin given     Problem: Infection  Goal: Absence of Infection Signs and Symptoms  Outcome: Ongoing, Progressing     Problem: Impaired Wound Healing  Goal: Optimal Wound Healing  Outcome: Ongoing, Progressing     Problem: Skin Injury Risk Increased  Goal: Skin Health and Integrity  Outcome: Ongoing, Progressing     Problem: Fall Injury Risk  Goal: Absence of Fall and Fall-Related Injury  Outcome: Ongoing, Progressing     POC reviewed. Address questions and concerns. AAOX4. Hydralazine,prn. Encourage reposition. Remain IV abx.  Heel boots in place. Urinal and call light in reach. No complaint of discomfort/pain. Frequent safety checks performed. TM

## 2022-06-28 NOTE — SUBJECTIVE & OBJECTIVE
Telemedicine  This service was provided by Virtual Visit.    Patient was transferred to [facility] on:  6/18/2022    Chief Complaint   Patient presents with    Wound Check     Sent from podiatry and stated in pain. L toes amputated     The patient location is: 8092/8092 A   Admitted 6/15/2022  2:10 PM  Present with the patient at the time of the telemed/virtual assessment: Telepresenter    Interval History / Events Overnight:   The patient is able to provide adequate history. Additional history was obtained from past medical records. No significant events reported by Nursing.   Patient complains of nothing specific. Symptoms have been unchanged since yesterday. Associated symptoms include: fatigue. Symptoms are stable.     Lab test(s) reviewed:  sCr stable.    Review of Systems   Constitutional:  Negative for fever.   Respiratory:  Negative for shortness of breath.      Objective:     Vital Signs (Most Recent):  Temp: 98.3 °F (36.8 °C) (06/28/22 0745)  Pulse: 63 (06/28/22 0745)  Resp: 18 (06/28/22 0745)  BP: (!) 174/77 (06/28/22 0745)  SpO2: 96 % (06/28/22 0745)   Vital Signs (24h Range):  Temp:  [98.1 °F (36.7 °C)-98.8 °F (37.1 °C)] 98.3 °F (36.8 °C)  Pulse:  [53-72] 63  Resp:  [18] 18  SpO2:  [93 %-96 %] 96 %  BP: (139-174)/(63-77) 174/77     Weight: 124.3 kg (274 lb 0.5 oz)  Body mass index is 40.47 kg/m².    Intake/Output Summary (Last 24 hours) at 6/28/2022 1120  Last data filed at 6/28/2022 0857  Gross per 24 hour   Intake 240 ml   Output 600 ml   Net -360 ml        Physical Exam  Constitutional:       General: He is not in acute distress.     Appearance: Normal appearance.   Eyes:      General: Lids are normal. No scleral icterus.        Right eye: No discharge.         Left eye: No discharge.      Conjunctiva/sclera: Conjunctivae normal.   Neck:      Trachea: Phonation normal.   Cardiovascular:      Rate and Rhythm: Bradycardia present.      Comments: Monitor / Vital signs reviewed at time of  visit  Pulmonary:      Effort: Pulmonary effort is normal. No tachypnea, accessory muscle usage or respiratory distress.   Abdominal:      General: There is no distension.   Musculoskeletal:      Left foot: Deformity (s/p partial amputation) present.   Skin:     Coloration: Skin is not cyanotic.   Neurological:      Mental Status: He is alert. He is not disoriented.   Psychiatric:         Attention and Perception: Attention normal.         Behavior: Behavior is cooperative.       Significant Labs:   Recent Labs   Lab 06/16/22  1100   HGBA1C 9.5*       Recent Labs   Lab 06/27/22  1616 06/27/22  2112 06/28/22  0747   POCTGLUCOSE 134* 140* 187*       Recent Labs   Lab 06/24/22  0545 06/26/22  0538 06/28/22  0621   WBC 7.24 8.49 8.71   HGB 10.1* 10.6* 10.8*   HCT 32.3* 34.3* 34.0*    371 364       Recent Labs   Lab 06/24/22  0545 06/26/22  0538 06/28/22  0621   GRAN 64.1  4.6 67.1  5.7 64.4  5.6   LYMPH 19.3  1.4 16.8*  1.4 17.6*  1.5   MONO 9.0  0.7 8.6  0.7 8.7  0.8   EOS 0.5 0.6* 0.7*       Recent Labs   Lab 06/23/22  0951 06/24/22  0545 06/26/22  0538 06/27/22  0520 06/28/22  0621   * 133* 137 136 137   K 4.2 4.4 4.1 4.2 4.5   CL 99 100 103 104 104   CO2 28 24 25 24 23   BUN 27* 24* 22 23 22   CREATININE 1.6* 1.5* 1.3 1.4 1.4   * 229* 215* 168* 174*   CALCIUM 9.1 8.9 8.7 8.7 8.8   ALBUMIN 2.6* 2.6* 2.5*  --  2.6*   MG  --   --   --  1.7  --    PHOS 3.2  --   --   --   --        Recent Labs   Lab 06/24/22  0545 06/26/22  0538 06/28/22  0621   ALKPHOS 71 69 66   ALT 10 14 14   AST 18 19 17   PROT 6.5 7.1 6.6   BILITOT 0.2 0.2 0.3       Recent Labs   Lab 02/23/22  1407 06/15/22  1633   LACTATE  --  1.4   SEDRATE >120* >120*       SARS-CoV2 (COVID-19) Qualitative PCR (no units)   Date Value   03/29/2021 Not Detected   03/15/2021 Not Detected   03/02/2021 Not Detected   02/22/2021 Not Detected   01/29/2021 Not Detected     SARS-CoV-2 RNA, Amplification, Qual (no units)   Date Value    02/03/2021 Negative     POC Rapid COVID (no units)   Date Value   06/15/2022 Negative   02/18/2021 Negative   12/08/2020 Negative     X-Ray Chest 1 View S/P PICC Line by Nursing  Narrative: EXAMINATION:  XR CHEST 1 VIEW S/P PICC LINE BY NURSING    CLINICAL HISTORY:  s/p picc placement;    TECHNIQUE:  Single frontal view of chest.    COMPARISON:  March 3, 2021    FINDINGS:  Right PICC line now evident, tip superimposing lower superior vena caval soft tissues.  Normal heart size.  Normal pulmonary vasculature.  No focal infiltrates.  No pleural fluid or pneumothorax.  No acute bony findings.  Impression: 1. Right PICC line tip superimposes lower superior vena caval soft tissues.  2. No active cardiac or pulmonary findings    Electronically signed by: John Thomas  Date:    06/24/2022  Time:    12:31      Labs and Imaging within the last 24 hours listed above were reviewed.       Diet: Diet diabetic [facility]; 2000 Calorie; Cardiac (Low Na/Chol)  Diet diabetic  Significant LDAs:   IV Access Type: Peripheral  Urinary Catheter Indication if present: Patient Does Not Have Urinary Catheter  Other Lines/Tubes/Drains:    HIGH RISK CONDITION(S):   Patient has a condition that poses threat to life and bodily function: limb ischemia likely to prevent wound healing      Goals of Care:    Previous admission:  2/17/21  Likely prognosis:  Fair  Code Status: Full Code  Comfort Only: No  Hospice: No  Goals at discharge: remain at home, with physician follow-up    Discharge Planning:  OXANA: 6/28/2022     Code Status: Full Code   Is the patient medically ready for discharge?: Yes    Reason for patient still in hospital (select all that apply): Patient trending condition, Treatment, and Pending disposition  Discharge Plan A: Skilled Nursing Facility

## 2022-06-28 NOTE — PLAN OF CARE
Problem: Adult Inpatient Plan of Care  Goal: Plan of Care Review  Outcome: Ongoing, Progressing  Goal: Patient-Specific Goal (Individualized)  Outcome: Ongoing, Progressing  Goal: Absence of Hospital-Acquired Illness or Injury  Outcome: Ongoing, Progressing  Goal: Optimal Comfort and Wellbeing  Outcome: Ongoing, Progressing  Goal: Readiness for Transition of Care  Outcome: Ongoing, Progressing     Problem: Bariatric Environmental Safety  Goal: Safety Maintained with Care  Outcome: Ongoing, Progressing     Problem: Diabetes Comorbidity  Goal: Blood Glucose Level Within Targeted Range  Outcome: Ongoing, Progressing     Problem: Fluid and Electrolyte Imbalance (Acute Kidney Injury/Impairment)  Goal: Fluid and Electrolyte Balance  Outcome: Ongoing, Progressing     Problem: Oral Intake Inadequate (Acute Kidney Injury/Impairment)  Goal: Optimal Nutrition Intake  Outcome: Ongoing, Progressing     Problem: Renal Function Impairment (Acute Kidney Injury/Impairment)  Goal: Effective Renal Function  Outcome: Ongoing, Progressing     Problem: Impaired Wound Healing  Goal: Optimal Wound Healing  Outcome: Ongoing, Progressing     Problem: Skin Injury Risk Increased  Goal: Skin Health and Integrity  Outcome: Ongoing, Progressing     Problem: Infection  Goal: Absence of Infection Signs and Symptoms  Outcome: Ongoing, Progressing     Problem: Fall Injury Risk  Goal: Absence of Fall and Fall-Related Injury  Outcome: Ongoing, Progressing    Patient is in good condition. Anticipating discharge to SNF. All ADLs me, frequent checks performed. Ongoing tele monitoring in progress.

## 2022-06-28 NOTE — ASSESSMENT & PLAN NOTE
Continued losartan 100mg and HCTZ 25 mg, with hydralazine 25 mg PO q8h PRN  Added nifedipine 6/18  Consulted PharmD for med history: patient reports no longer taking isosorbide-hydralazine, losartan, and losartan-HCTZ  Discontinued ARB, held HCTZ due to increasing sCr. Continued nifedipine for BP control.  Resuming diuretic now that renal insufficiency has resolved.

## 2022-06-28 NOTE — PT/OT/SLP PROGRESS
Occupational Therapy   Treatment    Name: Saji Castañeda  MRN: [id number]  Admitting Diagnosis:  Osteomyelitis of left lower extremity       Recommendations:     Discharge Recommendations: nursing facility, skilled  Discharge Equipment Recommendations:  wheelchair, bedside commode, walker, rolling  Barriers to discharge:  Decreased caregiver support    Assessment:     Saji Castañeda is a 72 y.o. male with a medical diagnosis of Osteomyelitis of left lower extremity.  He presents with agreement to get OOB. Pt required SBA from supine<>sit. Pt total A donning Tata shoe on R foot. Pt appeared disappointed at his performance as he wanted to stand with the RW but was unable secondary to weakness.Performance deficits affecting function are weakness, impaired endurance, impaired self care skills, impaired functional mobilty, gait instability, pain, decreased lower extremity function. Recommending SNF  once medically appropriate for discharge to increase maximal independence, reduce burden of care, and ensure safety. Patient would benefit from continued skilled acute OT 3x/wk to improve functional mobility, increase independence with ADLs, and address established goals.    Rehab Prognosis:  Good; patient would benefit from acute skilled OT services to address these deficits and reach maximum level of function.       Plan:     Patient to be seen 3 x/week to address the above listed problems via self-care/home management, therapeutic activities  · Plan of Care Expires: 07/21/22  · Plan of Care Reviewed with: patient    Subjective     Pain/Comfort:  · Pain Rating 1: other (see comments) (unrated)  · Location - Side 1: Left  · Location - Orientation 1: generalized  · Location 1: hand  · Pain Addressed 1: Distraction    Objective:     Communicated with: nurse prior to session.  Patient found HOB elevated with telemetry upon OT entry to room.    General Precautions: Standard, fall, contact   Orthopedic Precautions:RLE partial  weight bearing   Braces: N/A  Respiratory Status: Room air     Occupational Performance:     Bed Mobility:    · Patient completed Rolling/Turning to Right with stand by assistance  · Patient completed Scooting/Bridging with stand by assistance  · Patient completed Supine to Sit with stand by assistance  · Patient completed Sit to Supine with stand by assistance     Functional Mobility/Transfers:  · Patient completed Sit <> Stand Transfer with contact guard assistance  with  rolling walker   · Functional Mobility:unable to perfrom secondary to weakness upon attempt.    Activities of Daily Living:  · Lower Body Dressing: maximal assistance don/doff Tata shoe RLE      [facility] 6 Click ADL: 17    Treatment & Education:  Role of OT and POC  Safety  ADL retraining  Functional mobility training  Discharge planning  Importance of EOB/OOB activity      Patient left HOB elevated with all lines intact, call button in reach and nurse notifiedEducation:      GOALS:   Multidisciplinary Problems     Occupational Therapy Goals        Problem: Occupational Therapy    Goal Priority Disciplines Outcome Interventions   Occupational Therapy Goal     OT, PT/OT Ongoing, Progressing    Description: Goals to be met by: 07-02-22     Patient will increase functional independence with ADLs by performing:    UE Dressing with Set-up Assistance.  LE Dressing with Minimal Assistance.  Grooming while seated with Set-up Assistance.  Toileting from bedside commode with Stand-by Assistance for hygiene and clothing management.   Supine to sit with Stand-by Assistance.  Stand pivot transfers with Stand-by Assistance with RW and PWB in left heel only  Toilet transfer to bedside commode with Stand-by Assistance.  Pt. To perform scoot pivot transfer with Supervision to and from the wheelchair                     Time Tracking:     OT Date of Treatment: 06/28/22  OT Start Time: 1615  OT Stop Time: 1641  OT Total Time (min): 26 min    Billable  Minutes:Therapeutic Activity 26    OT/KATHLEEN: OT          6/28/2022

## 2022-06-28 NOTE — PLAN OF CARE
06/28/22 0952   Post-Acute Status   Post-Acute Authorization Placement   Post-Acute Placement Status Pending payor review/awaiting authorization (if required)   Spoke with Priya (Ph: [phone], ext [phone] / Fax: [phone]) with [employer]. Discussed the appeals process and faxed all updated documentation to Priya. The decision can take up to 72 hours. Updated medical team and Brandy at [facility].    Toshia Dennis LMSW  [facility]  Ext. [phone]

## 2022-06-28 NOTE — ASSESSMENT & PLAN NOTE
-Last A1c 2020, repeat > 9%. Per patient, taking 68 units Lantus QHS along with metformin  mg and glipizide 10 mg daily (Possibly, he is not sure)  -Per Pharm med history: patient is no longer taking insulin  -Mildly hypoglycemic on admit with BG 63  -decreased dose of basal insulin to 15 units Levemir, Titrate up as needed. Added prandial insulin 6/19  -worsening hyperglycemia as oral agents wash out. Levemir increased to BID and aspart increased with meals.  - doses adjusted 6/21; increased for 6/23, and again for 6/25  - some hyperglycemia due to dietary indiscretion; decreasing basal insulin 6/27

## 2022-06-28 NOTE — PLAN OF CARE
[facility] - Telemetry Stepdown  Discharge Reassessment    Primary Care Provider: Mart Escalante MD    Expected Discharge Date: 6/28/2022    Reassessment (most recent)     Discharge Reassessment - 06/28/22 1503        Discharge Reassessment    Assessment Type Discharge Planning Reassessment     Did the patient's condition or plan change since previous assessment? No     Discharge Plan discussed with: Patient     Discharge Plan A Skilled Nursing Facility     Discharge Plan B Long-term acute care facility (LTAC)     DME Needed Upon Discharge  other (see comments)   TBD    Why the patient remains in the hospital Insurance issues        Post-Acute Status    Post-Acute Authorization Placement     Post-Acute Placement Status Pending payor review/awaiting authorization (if required)               Barb Brown RN  Ext [phone]

## 2022-06-28 NOTE — ASSESSMENT & PLAN NOTE
-PVD contributing to current non-healing foot wound. Arterial US shows LLE atherosclerotic disease, areas of high-grade stenosis within the L popliteal artery  -Continue home statin, ASA  -Vascular Surgery consulted - patient considering surgery (BKA); seems reluctant primarily due to the time he expects it to take to obtain a prosthetic limb. Wants to try conservative management with antibiotics.  -Plan for SNF for IV antibiotics, OT/PT  - Follow up with Podiatry, ID and Vascular Surgery

## 2022-06-28 NOTE — ASSESSMENT & PLAN NOTE
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 S/p bone biopsy.  -ID consulted. Cultures growing Staph aureus and GBS   - L foot wound anaerobic culture growing Prevotella and Porphyromonas Somerae. PO Flagyl added.    - L foot bone culture growing Staph species, continued IV ceftriaxone and vancomycin (per Pharmacy dosing).  - with acute renal insufficiency, vancomycin changed to daptomycin and ceftriaxone discontinued as not needed based on current cultures. Continuing Flagyl  -Continuing daptomycin and Flagyl; Estimated end date of daptomycin: 7/28/22. Estimated end date of Flagyl: 7/4/22

## 2022-06-28 NOTE — PT/OT/SLP PROGRESS
Physical Therapy      Patient Name:  Saji Castañeda   MRN:  [id number]    Patient not seen today secondary to Other (Comment). Per chart review, patient planning to d/c today in PM. POC remains appropriate. Will follow-up when appropriate.

## 2022-06-29 LAB
POCT GLUCOSE: 190 MG/DL (ref 70–110)
POCT GLUCOSE: 226 MG/DL (ref 70–110)
POCT GLUCOSE: 227 MG/DL (ref 70–110)
POCT GLUCOSE: 246 MG/DL (ref 70–110)

## 2022-06-29 PROCEDURE — A4216 STERILE WATER/SALINE, 10 ML: HCPCS | Performed by: INTERNAL MEDICINE

## 2022-06-29 PROCEDURE — 97530 THERAPEUTIC ACTIVITIES: CPT

## 2022-06-29 PROCEDURE — 63600175 PHARM REV CODE 636 W HCPCS: Performed by: PHYSICIAN ASSISTANT

## 2022-06-29 PROCEDURE — 97110 THERAPEUTIC EXERCISES: CPT

## 2022-06-29 PROCEDURE — 99233 SBSQ HOSP IP/OBS HIGH 50: CPT | Mod: 95,,, | Performed by: INTERNAL MEDICINE

## 2022-06-29 PROCEDURE — 99233 PR SUBSEQUENT HOSPITAL CARE,LEVL III: ICD-10-PCS | Mod: 95,,, | Performed by: INTERNAL MEDICINE

## 2022-06-29 PROCEDURE — 25000003 PHARM REV CODE 250: Performed by: REGISTERED NURSE

## 2022-06-29 PROCEDURE — 20600001 HC STEP DOWN PRIVATE ROOM

## 2022-06-29 PROCEDURE — 25000003 PHARM REV CODE 250: Performed by: NURSE PRACTITIONER

## 2022-06-29 PROCEDURE — 27000207 HC ISOLATION

## 2022-06-29 PROCEDURE — 25000003 PHARM REV CODE 250: Performed by: INTERNAL MEDICINE

## 2022-06-29 PROCEDURE — 82550 ASSAY OF CK (CPK): CPT | Performed by: INTERNAL MEDICINE

## 2022-06-29 PROCEDURE — 25000003 PHARM REV CODE 250: Performed by: HOSPITALIST

## 2022-06-29 PROCEDURE — 63600175 PHARM REV CODE 636 W HCPCS: Performed by: INTERNAL MEDICINE

## 2022-06-29 PROCEDURE — 36415 COLL VENOUS BLD VENIPUNCTURE: CPT | Performed by: INTERNAL MEDICINE

## 2022-06-29 PROCEDURE — 25000003 PHARM REV CODE 250: Performed by: PHYSICIAN ASSISTANT

## 2022-06-29 RX ORDER — HYDRALAZINE HYDROCHLORIDE 50 MG/1
75 TABLET, FILM COATED ORAL EVERY 12 HOURS
Start: 2022-06-29 | End: 2022-07-08 | Stop reason: HOSPADM

## 2022-06-29 RX ORDER — HYDRALAZINE HYDROCHLORIDE 25 MG/1
25 TABLET, FILM COATED ORAL EVERY 8 HOURS PRN
Status: DISCONTINUED | OUTPATIENT
Start: 2022-06-29 | End: 2022-07-08 | Stop reason: HOSPADM

## 2022-06-29 RX ORDER — HYDRALAZINE HYDROCHLORIDE 25 MG/1
25 TABLET, FILM COATED ORAL EVERY 8 HOURS
Status: DISCONTINUED | OUTPATIENT
Start: 2022-06-29 | End: 2022-07-02

## 2022-06-29 RX ADMIN — INSULIN ASPART 2 UNITS: 100 INJECTION, SOLUTION INTRAVENOUS; SUBCUTANEOUS at 05:06

## 2022-06-29 RX ADMIN — HYDRALAZINE HYDROCHLORIDE 25 MG: 25 TABLET, FILM COATED ORAL at 01:06

## 2022-06-29 RX ADMIN — INSULIN ASPART 2 UNITS: 100 INJECTION, SOLUTION INTRAVENOUS; SUBCUTANEOUS at 08:06

## 2022-06-29 RX ADMIN — ATORVASTATIN CALCIUM 80 MG: 20 TABLET, FILM COATED ORAL at 08:06

## 2022-06-29 RX ADMIN — TAMSULOSIN HYDROCHLORIDE 0.4 MG: 0.4 CAPSULE ORAL at 08:06

## 2022-06-29 RX ADMIN — Medication 10 ML: at 06:06

## 2022-06-29 RX ADMIN — GABAPENTIN 200 MG: 100 CAPSULE ORAL at 09:06

## 2022-06-29 RX ADMIN — INSULIN ASPART 20 UNITS: 100 INJECTION, SOLUTION INTRAVENOUS; SUBCUTANEOUS at 01:06

## 2022-06-29 RX ADMIN — Medication 10 ML: at 01:06

## 2022-06-29 RX ADMIN — NIFEDIPINE 90 MG: 30 TABLET, FILM COATED, EXTENDED RELEASE ORAL at 08:06

## 2022-06-29 RX ADMIN — METRONIDAZOLE 500 MG: 500 TABLET ORAL at 01:06

## 2022-06-29 RX ADMIN — HYDROCHLOROTHIAZIDE 12.5 MG: 12.5 TABLET ORAL at 08:06

## 2022-06-29 RX ADMIN — ASPIRIN 81 MG: 81 TABLET, COATED ORAL at 08:06

## 2022-06-29 RX ADMIN — GABAPENTIN 200 MG: 100 CAPSULE ORAL at 08:06

## 2022-06-29 RX ADMIN — Medication 1 CAPSULE: at 08:06

## 2022-06-29 RX ADMIN — HYDRALAZINE HYDROCHLORIDE 25 MG: 25 TABLET, FILM COATED ORAL at 09:06

## 2022-06-29 RX ADMIN — DAPTOMYCIN 995 MG: 350 INJECTION, POWDER, LYOPHILIZED, FOR SOLUTION INTRAVENOUS at 06:06

## 2022-06-29 RX ADMIN — Medication 1 CAPSULE: at 09:06

## 2022-06-29 RX ADMIN — INSULIN ASPART 20 UNITS: 100 INJECTION, SOLUTION INTRAVENOUS; SUBCUTANEOUS at 05:06

## 2022-06-29 RX ADMIN — HYDRALAZINE HYDROCHLORIDE 25 MG: 25 TABLET, FILM COATED ORAL at 05:06

## 2022-06-29 RX ADMIN — MUPIROCIN: 20 OINTMENT TOPICAL at 08:06

## 2022-06-29 RX ADMIN — METRONIDAZOLE 500 MG: 500 TABLET ORAL at 09:06

## 2022-06-29 RX ADMIN — METRONIDAZOLE 500 MG: 500 TABLET ORAL at 05:06

## 2022-06-29 RX ADMIN — INSULIN ASPART 20 UNITS: 100 INJECTION, SOLUTION INTRAVENOUS; SUBCUTANEOUS at 08:06

## 2022-06-29 RX ADMIN — INSULIN ASPART 2 UNITS: 100 INJECTION, SOLUTION INTRAVENOUS; SUBCUTANEOUS at 01:06

## 2022-06-29 NOTE — SUBJECTIVE & OBJECTIVE
Telemedicine  This service was provided by Virtual Visit.    Patient was transferred to [facility] on:  6/18/2022    Chief Complaint   Patient presents with    Wound Check     Sent from podiatry and stated in pain. L toes amputated     The patient location is: 8092/8092 A   Admitted 6/15/2022  2:10 PM  Present with the patient at the time of the telemed/virtual assessment: Telepresenter    Interval History / Events Overnight:   The patient is able to provide adequate history. Additional history was obtained from past medical records. No significant events reported by Nursing.   Patient complains of nothing specific. Symptoms have been unchanged since yesterday. Associated symptoms include: fatigue. Symptoms are stable.     Lab test(s) reviewed:  sCr stable.    Review of Systems   Constitutional:  Negative for fever.   Respiratory:  Negative for shortness of breath.    Gastrointestinal:  Negative for diarrhea.     Objective:     Vital Signs (Most Recent):  Temp: 98.3 °F (36.8 °C) (06/29/22 1105)  Pulse: (!) 52 (06/29/22 1105)  Resp: 18 (06/29/22 1105)  BP: 138/61 (06/29/22 1105)  SpO2: 96 % (06/29/22 1105)   Vital Signs (24h Range):  Temp:  [97.1 °F (36.2 °C)-98.4 °F (36.9 °C)] 98.3 °F (36.8 °C)  Pulse:  [52-88] 52  Resp:  [18-20] 18  SpO2:  [96 %-98 %] 96 %  BP: (138-201)/(61-84) 138/61     Weight: 124.3 kg (274 lb 0.5 oz)  Body mass index is 40.47 kg/m².    Intake/Output Summary (Last 24 hours) at 6/29/2022 1121  Last data filed at 6/29/2022 1105  Gross per 24 hour   Intake 120 ml   Output 1100 ml   Net -980 ml        Physical Exam  Constitutional:       General: He is not in acute distress.     Appearance: Normal appearance.   Eyes:      General: Lids are normal. No scleral icterus.        Right eye: No discharge.         Left eye: No discharge.      Conjunctiva/sclera: Conjunctivae normal.   Neck:      Trachea: Phonation normal.   Cardiovascular:      Rate and Rhythm: Bradycardia present.       Comments: Monitor / Vital signs reviewed at time of visit  Pulmonary:      Effort: Pulmonary effort is normal. No tachypnea, accessory muscle usage or respiratory distress.   Abdominal:      General: There is no distension.   Musculoskeletal:      Left foot: Deformity (s/p partial amputation) present.   Skin:     Coloration: Skin is not cyanotic.   Neurological:      Mental Status: He is alert. He is not disoriented.   Psychiatric:         Attention and Perception: Attention normal.         Behavior: Behavior is cooperative.       Significant Labs:   Recent Labs   Lab 06/16/22  1100   HGBA1C 9.5*       Recent Labs   Lab 06/28/22  2112 06/29/22  0735 06/29/22  1106   POCTGLUCOSE 230* 227* 226*       Recent Labs   Lab 06/24/22  0545 06/26/22  0538 06/28/22  0621   WBC 7.24 8.49 8.71   HGB 10.1* 10.6* 10.8*   HCT 32.3* 34.3* 34.0*    371 364       Recent Labs   Lab 06/24/22  0545 06/26/22  0538 06/28/22  0621   GRAN 64.1  4.6 67.1  5.7 64.4  5.6   LYMPH 19.3  1.4 16.8*  1.4 17.6*  1.5   MONO 9.0  0.7 8.6  0.7 8.7  0.8   EOS 0.5 0.6* 0.7*       Recent Labs   Lab 06/23/22  0951 06/24/22  0545 06/26/22  0538 06/27/22  0520 06/28/22  0621   * 133* 137 136 137   K 4.2 4.4 4.1 4.2 4.5   CL 99 100 103 104 104   CO2 28 24 25 24 23   BUN 27* 24* 22 23 22   CREATININE 1.6* 1.5* 1.3 1.4 1.4   * 229* 215* 168* 174*   CALCIUM 9.1 8.9 8.7 8.7 8.8   ALBUMIN 2.6* 2.6* 2.5*  --  2.6*   MG  --   --   --  1.7  --    PHOS 3.2  --   --   --   --        Recent Labs   Lab 06/24/22  0545 06/26/22  0538 06/28/22  0621   ALKPHOS 71 69 66   ALT 10 14 14   AST 18 19 17   PROT 6.5 7.1 6.6   BILITOT 0.2 0.2 0.3       Recent Labs   Lab 02/23/22  1407 06/15/22  1633   LACTATE  --  1.4   SEDRATE >120* >120*       SARS-CoV2 (COVID-19) Qualitative PCR (no units)   Date Value   03/29/2021 Not Detected   03/15/2021 Not Detected   03/02/2021 Not Detected   02/22/2021 Not Detected   01/29/2021 Not Detected     SARS-CoV-2 RNA,  Amplification, Qual (no units)   Date Value   02/03/2021 Negative     POC Rapid COVID (no units)   Date Value   06/15/2022 Negative   02/18/2021 Negative   12/08/2020 Negative     X-Ray Chest 1 View S/P PICC Line by Nursing  Narrative: EXAMINATION:  XR CHEST 1 VIEW S/P PICC LINE BY NURSING    CLINICAL HISTORY:  s/p picc placement;    TECHNIQUE:  Single frontal view of chest.    COMPARISON:  March 3, 2021    FINDINGS:  Right PICC line now evident, tip superimposing lower superior vena caval soft tissues.  Normal heart size.  Normal pulmonary vasculature.  No focal infiltrates.  No pleural fluid or pneumothorax.  No acute bony findings.  Impression: 1. Right PICC line tip superimposes lower superior vena caval soft tissues.  2. No active cardiac or pulmonary findings    Electronically signed by: John Thomas  Date:    06/24/2022  Time:    12:31      Labs and Imaging within the last 24 hours listed above were reviewed.       Diet: Diet diabetic [facility]; 2000 Calorie; Cardiac (Low Na/Chol)  Diet diabetic  Significant LDAs:   IV Access Type: Peripheral  Urinary Catheter Indication if present: Patient Does Not Have Urinary Catheter  Other Lines/Tubes/Drains:    HIGH RISK CONDITION(S):   Patient has a condition that poses threat to life and bodily function: limb ischemia likely to prevent wound healing      Goals of Care:    Previous admission:  2/17/21  Likely prognosis:  Fair  Code Status: Full Code  Comfort Only: No  Hospice: No  Goals at discharge: remain at home, with physician follow-up    Discharge Planning:  OXANA: 6/29/2022     Code Status: Full Code   Is the patient medically ready for discharge?: Yes    Reason for patient still in hospital (select all that apply): Patient trending condition, Treatment, and Pending disposition  Discharge Plan A: Skilled Nursing Facility

## 2022-06-29 NOTE — PT/OT/SLP PROGRESS
"Occupational Therapy   Co-Treatment    Name: Saji Castañeda  MRN: [id number]  Admitting Diagnosis:  Osteomyelitis of left lower extremity        Co-evaluation/treatment performed due to patient's multiple deficits requiring two skilled therapists to appropriately and safely assess patient's strength and endurance while facilitating functional tasks in addition to accommodating for patient's activity tolerance.       Recommendations:     Discharge Recommendations: nursing facility, skilled  Discharge Equipment Recommendations:  wheelchair, bedside commode, walker, rolling  Barriers to discharge:  Decreased caregiver support    Assessment:     Saji Castañeda is a 72 y.o. male with a medical diagnosis of Osteomyelitis of left lower extremity.  Patient participated well in therapy session, and will still benefit from SNF placement.  Pt completed bed mobility with min A, sitting EOB with SBA, and sit to stand transfer with mod A x2 persons with RW.  Pt completed step transfer from EOB to upright chair with mod A x2 persons.  Pt required assistance donning AFO while sitting EOB, and expressed difficulty with TherEx attempts due to edema in BUE.     He presents with performance deficits affecting function are weakness, impaired self care skills, impaired balance, decreased safety awareness, impaired skin, decreased lower extremity function, gait instability, impaired sensation, impaired functional mobilty, orthopedic precautions.     Rehab Prognosis:  Good; patient would benefit from acute skilled OT services to address these deficits and reach maximum level of function.       Plan:     Patient to be seen 3 x/week to address the above listed problems via self-care/home management, therapeutic activities, therapeutic exercises  · Plan of Care Expires: 07/21/22  · Plan of Care Reviewed with: patient    Subjective     "Yeah, I gotta get out of this bed"     Pain/Comfort:  · Pain Rating 1: 0/10  · Pain Rating Post-Intervention 1: " 0/10    Objective:     Communicated with: BEREKET Pelaez prior to session.  Patient found supine with telemetry, PRAFO upon OT entry to room.    General Precautions: Standard, fall, special contact   Orthopedic Precautions:RLE partial weight bearing   Braces: N/A  Respiratory Status: Room air     Occupational Performance:     Bed Mobility:    · Patient completed Rolling/Turning to Left with  minimum assistance  · Patient completed Rolling/Turning to Right with minimum assistance  · Patient completed Scooting/Bridging with minimum assistance  · Patient completed Supine to Sit with minimum assistance with trunk stability     Functional Mobility/Transfers:  · Patient completed Sit <> Stand Transfer with moderate assistance of 2 persons  with  rolling walker   · Patient completed Bed <> Chair Transfer using Step Transfer technique with moderate assistance of 2 persons with rolling walker    Activities of Daily Living:  - Don AFO on right foot, total Assist from EOB   - pt states he can typically don AFO from wheelchair    Therapeutic Exercises:   -From upright chair, pt completed 10reps bilateral shoulder extension/flexion  -From upright chair, pt completed 10reps bilateral shoulder adduction/abduction    [facility] 6 Click ADL: 17    Treatment & Education:   Pt educated on role of OT, POC, and goals for therapy.     POC was dicussed with patient/caregiver, who was included in its development and is in agreement with the identified goals and treatment plan.    Patient and family aware of patient's deficits and therapy progression.    Time provided for therapeutic counseling and discussion of health disposition.    Educated on importance of EOB/OOB mobility, maintaining routine, sitting up in chair, and maximizing independence with ADLs during admission     Pt/caregiver verbalized understanding and expressed no further concerns/questions.      Patient left up in chair with all lines intact, call button in reach and RN  notifiedEducation:  .  Food tray/tabletop set up for pt in upright chair     GOALS:   Multidisciplinary Problems     Occupational Therapy Goals        Problem: Occupational Therapy    Goal Priority Disciplines Outcome Interventions   Occupational Therapy Goal     OT, PT/OT Ongoing, Progressing    Description: Goals to be met by: 07-02-22     Patient will increase functional independence with ADLs by performing:    UE Dressing with Set-up Assistance.  LE Dressing with Minimal Assistance.  Grooming while seated with Set-up Assistance.  Toileting from bedside commode with Stand-by Assistance for hygiene and clothing management.   Supine to sit with Stand-by Assistance.  Stand pivot transfers with Stand-by Assistance with RW and PWB in left heel only  Toilet transfer to bedside commode with Stand-by Assistance.  Pt. To perform scoot pivot transfer with Supervision to and from the wheelchair                     Time Tracking:     OT Date of Treatment: 06/29/22  OT Start Time: 1303  OT Stop Time: 1321  OT Total Time (min): 18 min    Billable Minutes:Therapeutic Exercise 18    OT/KATHLEEN: OT          6/29/2022

## 2022-06-29 NOTE — PT/OT/SLP PROGRESS
Physical Therapy Co-Treatment  Co-treatment with OT for maximal pt participation, safety, and activity tolerance    Patient Name:  Saji Castañeda   MRN:  [id number]  Admitting Diagnosis:  Osteomyelitis of left lower extremity   Recent Surgery: * No surgery found *    Admit Date: 6/15/2022  Length of Stay: 14 days    Recommendations:     Discharge Recommendations:  nursing facility, skilled   Discharge Equipment Recommendations: wheelchair, bedside commode, walker, rolling   Barriers to discharge: Decreased Caregiver support and Evolving Clinical Presentation    Assessment:     Saji Castañeda is a 72 y.o. male admitted with a medical diagnosis of Osteomyelitis of left lower extremity.  He presents with the following impairments/functional limitations:  weakness, impaired self care skills, impaired balance, decreased safety awareness, impaired skin, decreased lower extremity function, gait instability, impaired sensation, impaired endurance, impaired functional mobilty, orthopedic precautions.     Pt presents today eager to get into bedside chair. Bilateral feet in Prevalon boots, R residual foot wrapped in ace wrap. Pt sits EOB from supine with min A for getting torso fully upright. Pt stands from EOB with mod A x2, xfers to bedside chair with mod A x2, heel bearing WB only maintained, DARCO worn on R. Pt tolerated treatment well, continue to progress mobility as able.     2nd visit to assist patient back to bed from bedside chair. Pt stood from chair with maximal assistance and transferred back to bed with step transfer and RW with mod A.     Rehab Prognosis: Fair; patient would benefit from acute skilled PT services to address these deficits and reach maximum level of function.    Recent Surgery: * No surgery found *      Highest level of mobility achieved this visit: mod A x2 step transfer to bedside chair    Activity with RN/PCT: transfer with 2 person assist and walker    Plan:     During this hospitalization,  "patient to be seen 3 x/week to address the identified rehab impairments via gait training, therapeutic activities, therapeutic exercises, neuromuscular re-education and progress toward the following goals:    · Plan of Care Expires:  07/20/22    Subjective     RN notified prior to session. No family present upon PT entrance into room.    Chief Complaint: LE weakness  Patient/Family Comments/goals: "I'll feel better if I get over to that chair."  Pain/Comfort:  · Pain Rating 1: 0/10      Objective:     Additional staff present: OT Lisbeth    Patient found supine with: telemetry, PRAFO   Cognition:   · Alert and Cooperative  · Command following: Follows two-step verbal commands  · Fluency: mildly difficult to understand - mumbles at times  General Precautions: Standard, Cardiac fall, special contact   Orthopedic Precautions:RLE partial weight bearing (heel only for very short transfers)   Braces: N/A   Body mass index is 40.47 kg/m².  Oxygen Device: Room Air      Vitals: /61 (BP Location: Left arm, Patient Position: Lying)   Pulse (!) 52   Temp 98.3 °F (36.8 °C) (Oral)   Resp 18   Ht 5' 9" (1.753 m)   Wt 124.3 kg (274 lb 0.5 oz)   SpO2 96%   BMI 40.47 kg/m²     Outcome Measures:  AM-PAC 6 CLICK MOBILITY  Turning over in bed (including adjusting bedclothes, sheets and blankets)?: 3  Sitting down on and standing up from a chair with arms (e.g., wheelchair, bedside commode, etc.): 2  Moving from lying on back to sitting on the side of the bed?: 3  Moving to and from a bed to a chair (including a wheelchair)?: 2  Need to walk in hospital room?: 2  Climbing 3-5 steps with a railing?: 1  Basic Mobility Total Score: 13     Functional Mobility:    Bed Mobility:   · Supine to Sit: minimum assistance; from R side of bed  · Scooting anteriorly to EOB to have both feet planted on floor: contact guard assistance  · Pt left in bedside chair    Sitting Balance at Edge of Bed:   Static Sitting Balance: Good- : able to " accept minimal resistance   Dynamic Sitting Balance: Good- : able to sit unsupported and weight shift across midline minimally   Assistance Level Required: Supervision   Time: 8 min   Postural deviations noted: mild posterior lean    Transfers:   · Sit <> Stand Transfer: moderate assistance of 2 persons with rolling walker and maximum assistance of 1 person with rolling walker  · Stand <> Sit Transfer: minimum assistance with rolling walker   · [id number] from EOB   · x1 trial from bedside chair  · Bed <> Chair Transfer: Step Transfer technique with moderate assistance of 2 persons with rolling walker  · Chair on patient's R  · Chair <> Bed Transfer: Step transfer technique with moderate assistance with rolling walker    Standing Balance:   Static Standing Balance: Fair : able to stand unsupported without UE support and without LOB for 1 minute   Dynamic Standing Balance: Fair : stand independently unsupported, weight shift, and reach ipsilaterally. LOB noted when crossing midline.   Assistance Level Required: Minimal Assistance   Patient used: rolling walker    Time: 5 min   Postural deviations noted: no deviations noted    Gait:  · Patient ambulated: small steps to bedside chair and back to bed on second visit  · Patient required: minimal assist  · Patient used:  rolling walker   · Gait belt utilized    Therapeutic Exercises:    Seated A: Pt performed Marches, LAQs x 10 repetitions with facilitation for correct performance and sequencing. Exercises performed to develop and maintain pt's  strength and endurance.     Education:   Time provided for education, counseling and discussion of health disposition in regards to patient's current status   All questions answered within PT scope of practice and to patient's satisfaction   PT role in POC to address current functional deficits   Pt educated on proper body mechanics, safety techniques, and energy conservation with PT facilitation and cueing throughout  session    Patient left up in chair with all lines intact, call button in reach and RN notified.  Patient back to bed and left with nursing for clean-up after second visit.    GOALS:   Multidisciplinary Problems     Physical Therapy Goals        Problem: Physical Therapy    Goal Priority Disciplines Outcome Goal Variances Interventions   Physical Therapy Goal     PT, PT/OT Ongoing, Progressing     Description: Goals to be met by: 22     Patient will increase functional independence with mobility by performin. Supine to sit with Modified [place]  2. Sit to stand transfer with Supervision  3. Bed to chair transfer with Stand-by Assistance using LRAD  4. Gait  x 10 feet with Stand-by Assistance using LRAD                   Time Tracking:     PT Received On: 22  PT Start Time: 1303   1450  PT Stop Time: 1321  1500  PT Total Time (min): 18 min + 10 min      Billable Minutes:   · Therapeutic Activity 14 min   · Therapeutic Activity 10 min    Treatment Type: Treatment  PT/PTA: PT       Magdi Kumar, PT, DPT  2022

## 2022-06-29 NOTE — PROGRESS NOTES
"  [facility] - Telemetry Stepdown  Adult Nutrition  Consult Note    SUMMARY     Recommendations    1. Continue current Cardiac, Diabetic diet.   2. RD to monitor & follow-up.    Goals: Meet % EEN, EPN by RD f/u date  Nutrition Goal Status: new  Communication of RD Recs: reviewed with RN    Assessment and Plan    Nutrition Problem:  Excessive CHO intake    Related to (etiology):   Food and nutrition related knowledge deficit     Signs and Symptoms (as evidenced by):   A1C 9.5    Interventions(treatment strategy):  Collaboration of nutrition care w/ other providers    Nutrition Diagnosis Status:   New    Reason for Assessment    Reason For Assessment: identified at risk by screening criteria, length of stay, other (see comments) (A1C 9.5)  Diagnosis: other (see comments) (Osteomylitis)  Relevant Medical History: DM, L. BKA, CKD  Interdisciplinary Rounds: did not attend    General Information Comments: Unable to assess pt this AM, provider at bedside. Per RN documentation, pt tolerating diet w/ 50-75% PO intake. UBW: 275#, per chart review. Appears nourished w/ no indicators of malnutrition. Noted A1C of 9.5 - RD to attach Diabetic diet education to pt's discharge paperwork. Possible discharge today.  Nutrition Discharge Planning: Adequate PO intake    Nutrition/Diet History    Spiritual, Cultural Beliefs, Spiritism Practices, Values that Affect Care: no  Factors Affecting Nutritional Intake: None identified at this time    Anthropometrics    Temp: 98.2 °F (36.8 °C)  Height Method: Stated  Height: 5' 9" (175.3 cm)  Height (inches): 69 in  Weight Method: Bed Scale  Weight: 124.3 kg (274 lb 0.5 oz)  Weight (lb): 274.03 lb  Ideal Body Weight (IBW), Male: 160 lb  % Ideal Body Weight, Male (lb): 171.27 %  BMI (Calculated): 40.4  BMI Grade: greater than 40 - morbid obesity  Amputation %: 5.9  Total Amputation %: 5.9  Amputation Ideal Body Weight (IBW), Male (lb): 154.1 lb  Amputee BMI (kg/m2): 43.12 " kg/m2    Lab/Procedures/Meds    Pertinent Labs Reviewed: reviewed  Pertinent Labs Comments: GFR 57.2, A1C 9.5  Pertinent Medications Reviewed: reviewed  Pertinent Medications Comments: Statin    Estimated/Assessed Needs    Weight Used For Calorie Calculations: 124.3 kg (274 lb 0.5 oz)     Energy Calorie Requirements (kcal): 1978 kcal/d  Energy Need Method: [place]-St Jeor (1.0 PAL)     Protein Requirements: 113 g/d (.9 g/kg)   Weight Used For Protein Calculations: 124.8 kg (275 lb 2.2 oz)     Estimated Fluid Requirement Method: RDA Method (Per MD or 1 mL/kcal)  RDA Method (mL): 1978    Nutrition Prescription Ordered    Current Diet Order: 2000 kcal ADA, Cardiac    Evaluation of Received Nutrient/Fluid Intake    I/O: -8.5L since admit    Comments: LBM: 6/28    Tolerance: tolerating    Nutrition Risk    Level of Risk/Frequency of Follow-up:  (1x/week)     Monitor and Evaluation    Food and Nutrient Intake: energy intake, food and beverage intake  Food and Nutrient Adminstration: diet order  Physical Activity and Function: nutrition-related ADLs and IADLs  Anthropometric Measurements: weight, weight change  Biochemical Data, Medical Tests and Procedures: lipid profile, inflammatory profile, glucose/endocrine profile, gastrointestinal profile, electrolyte and renal panel  Nutrition-Focused Physical Findings: overall appearance     Nutrition Follow-Up    RD Follow-up?: Yes

## 2022-06-29 NOTE — PLAN OF CARE
Recommendations     1. Continue current Cardiac, Diabetic diet.   2. RD to monitor & follow-up.     Goals: Meet % EEN, EPN by RD f/u date  Nutrition Goal Status: new  Communication of RD Recs: reviewed with RN

## 2022-06-29 NOTE — PLAN OF CARE
Problem: Adult Inpatient Plan of Care  Goal: Plan of Care Review  Outcome: Ongoing, Progressing  Goal: Optimal Comfort and Wellbeing  Outcome: Ongoing, Progressing     Problem: Diabetes Comorbidity  Goal: Blood Glucose Level Within Targeted Range  Outcome: Ongoing, Progressing  Intervention: Monitor and Manage Glycemia  Flowsheets (Taken 6/29/2022 1806)  Glycemic Management:   blood glucose monitored   supplemental insulin given     Problem: Fluid and Electrolyte Imbalance (Acute Kidney Injury/Impairment)  Goal: Fluid and Electrolyte Balance  Outcome: Ongoing, Progressing     Problem: Impaired Wound Healing  Goal: Optimal Wound Healing  Outcome: Ongoing, Progressing     Problem: Infection  Goal: Absence of Infection Signs and Symptoms  Outcome: Ongoing, Progressing     Problem: Skin Injury Risk Increased  Goal: Skin Health and Integrity  Outcome: Ongoing, Progressing     POC reviewed. AAOX4. No acute changes. Pt was up in stretcher chair tolerated well. Wound care performed. Frequent safety checks performed. Urinal and call light in reach. TM

## 2022-06-29 NOTE — ASSESSMENT & PLAN NOTE
Per med history, patient no longer taking ARB.  sCr up slightly and vanco level > 20.  Discontinued ARB, holding diuretic for now. Trial of gentle hydration ineffective.  Discontinued vancomycin.  sCr improved, stable

## 2022-06-29 NOTE — PROGRESS NOTES
[facility] - [facility] Medicine  Telemedicine Progress Note    Patient Name: Saji Castañeda  MRN: [id number]  Patient Class: IP- Inpatient   Admission Date: 6/15/2022  Length of Stay: 13 days  Attending Physician: Salma Strauss MD  Primary Care Provider: Mart Escalante MD    Subjective:     Principal Problem:Osteomyelitis of left lower extremity    HPI:  71 y.o. M with PMHx DM2 and PVD with chronic diabetic foot wounds s/p L sided BKA, HTN, asymptomatic bradycardia, CKD 3 and HLD who presented to the ED for worsening R foot pain and drainage. Pt. With known chronic ulceration/osteomylitis to L heel and ID has recommended BKA for definitive therapy, but the patient reports that he has not been interested in amputation. Today, he had his usual f/u appointment with ID, and the patient was referred to the ED for imaging with bone biopsy and potential abx because of worsening drainage and pain. Pt. Denies any current fevers, chills, nausea, or other systemic signs of infection.      Overview/Hospital Course:  Patient admitted to hospital medicine. Podiatry and vascular surgery consulted. Patient underwent bone biopsy and culture. Infectious disease consulted.       Telemedicine  This service was provided by Virtual Visit.    Patient was transferred to [facility] on:  6/18/2022    Chief Complaint   Patient presents with    Wound Check     Sent from podiatry and stated in pain. L toes amputated     The patient location is: 8092/8092 A   Admitted 6/15/2022  2:10 PM  Present with the patient at the time of the telemed/virtual assessment: Telepresenter    Interval History / Events Overnight:   The patient is able to provide adequate history. Additional history was obtained from past medical records. No significant events reported by Nursing.   Patient complains of nothing specific. Symptoms have been unchanged since yesterday. Associated symptoms include: fatigue. Symptoms are stable.      Lab test(s) reviewed:  sCr stable.    Review of Systems   Constitutional:  Negative for fever.   Respiratory:  Negative for shortness of breath.      Objective:     Vital Signs (Most Recent):  Temp: 98.3 °F (36.8 °C) (06/28/22 0745)  Pulse: 63 (06/28/22 0745)  Resp: 18 (06/28/22 0745)  BP: (!) 174/77 (06/28/22 0745)  SpO2: 96 % (06/28/22 0745)   Vital Signs (24h Range):  Temp:  [98.1 °F (36.7 °C)-98.8 °F (37.1 °C)] 98.3 °F (36.8 °C)  Pulse:  [53-72] 63  Resp:  [18] 18  SpO2:  [93 %-96 %] 96 %  BP: (139-174)/(63-77) 174/77     Weight: 124.3 kg (274 lb 0.5 oz)  Body mass index is 40.47 kg/m².    Intake/Output Summary (Last 24 hours) at 6/28/2022 1120  Last data filed at 6/28/2022 0857  Gross per 24 hour   Intake 240 ml   Output 600 ml   Net -360 ml        Physical Exam  Constitutional:       General: He is not in acute distress.     Appearance: Normal appearance.   Eyes:      General: Lids are normal. No scleral icterus.        Right eye: No discharge.         Left eye: No discharge.      Conjunctiva/sclera: Conjunctivae normal.   Neck:      Trachea: Phonation normal.   Cardiovascular:      Rate and Rhythm: Bradycardia present.      Comments: Monitor / Vital signs reviewed at time of visit  Pulmonary:      Effort: Pulmonary effort is normal. No tachypnea, accessory muscle usage or respiratory distress.   Abdominal:      General: There is no distension.   Musculoskeletal:      Left foot: Deformity (s/p partial amputation) present.   Skin:     Coloration: Skin is not cyanotic.   Neurological:      Mental Status: He is alert. He is not disoriented.   Psychiatric:         Attention and Perception: Attention normal.         Behavior: Behavior is cooperative.       Significant Labs:   Recent Labs   Lab 06/16/22  1100   HGBA1C 9.5*       Recent Labs   Lab 06/27/22  1616 06/27/22  2112 06/28/22  0747   POCTGLUCOSE 134* 140* 187*       Recent Labs   Lab 06/24/22  0545 06/26/22  0538 06/28/22  0621   WBC 7.24 8.49 8.71    HGB 10.1* 10.6* 10.8*   HCT 32.3* 34.3* 34.0*    371 364       Recent Labs   Lab 06/24/22  0545 06/26/22  0538 06/28/22  0621   GRAN 64.1  4.6 67.1  5.7 64.4  5.6   LYMPH 19.3  1.4 16.8*  1.4 17.6*  1.5   MONO 9.0  0.7 8.6  0.7 8.7  0.8   EOS 0.5 0.6* 0.7*       Recent Labs   Lab 06/23/22  0951 06/24/22  0545 06/26/22  0538 06/27/22  0520 06/28/22  0621   * 133* 137 136 137   K 4.2 4.4 4.1 4.2 4.5   CL 99 100 103 104 104   CO2 28 24 25 24 23   BUN 27* 24* 22 23 22   CREATININE 1.6* 1.5* 1.3 1.4 1.4   * 229* 215* 168* 174*   CALCIUM 9.1 8.9 8.7 8.7 8.8   ALBUMIN 2.6* 2.6* 2.5*  --  2.6*   MG  --   --   --  1.7  --    PHOS 3.2  --   --   --   --        Recent Labs   Lab 06/24/22  0545 06/26/22  0538 06/28/22  0621   ALKPHOS 71 69 66   ALT 10 14 14   AST 18 19 17   PROT 6.5 7.1 6.6   BILITOT 0.2 0.2 0.3       Recent Labs   Lab 02/23/22  1407 06/15/22  1633   LACTATE  --  1.4   SEDRATE >120* >120*       SARS-CoV2 (COVID-19) Qualitative PCR (no units)   Date Value   03/29/2021 Not Detected   03/15/2021 Not Detected   03/02/2021 Not Detected   02/22/2021 Not Detected   01/29/2021 Not Detected     SARS-CoV-2 RNA, Amplification, Qual (no units)   Date Value   02/03/2021 Negative     POC Rapid COVID (no units)   Date Value   06/15/2022 Negative   02/18/2021 Negative   12/08/2020 Negative     X-Ray Chest 1 View S/P PICC Line by Nursing  Narrative: EXAMINATION:  XR CHEST 1 VIEW S/P PICC LINE BY NURSING    CLINICAL HISTORY:  s/p picc placement;    TECHNIQUE:  Single frontal view of chest.    COMPARISON:  March 3, 2021    FINDINGS:  Right PICC line now evident, tip superimposing lower superior vena caval soft tissues.  Normal heart size.  Normal pulmonary vasculature.  No focal infiltrates.  No pleural fluid or pneumothorax.  No acute bony findings.  Impression: 1. Right PICC line tip superimposes lower superior vena caval soft tissues.  2. No active cardiac or pulmonary findings    Electronically  signed by: John Thomas  Date:    06/24/2022  Time:    12:31      Labs and Imaging within the last 24 hours listed above were reviewed.       Diet: Diet diabetic [facility]; 2000 Calorie; Cardiac (Low Na/Chol)  Diet diabetic  Significant LDAs:   IV Access Type: Peripheral  Urinary Catheter Indication if present: Patient Does Not Have Urinary Catheter  Other Lines/Tubes/Drains:    HIGH RISK CONDITION(S):   Patient has a condition that poses threat to life and bodily function: limb ischemia likely to prevent wound healing      Goals of Care:    Previous admission:  2/17/21  Likely prognosis:  Fair  Code Status: Full Code  Comfort Only: No  Hospice: No  Goals at discharge: remain at home, with physician follow-up    Discharge Planning:  OXANA: 6/28/2022     Code Status: Full Code   Is the patient medically ready for discharge?: Yes    Reason for patient still in hospital (select all that apply): Patient trending condition, Treatment, and Pending disposition  Discharge Plan A: Skilled Nursing Facility         Assessment/Plan:      * Osteomyelitis of left lower extremity  -With acute on chronic osteomyleitis of LLE including heel with purulent foul smelling drainage  -Podiatry consulted, Not septic appearing. S/p bone biopsy.  -ID consulted. Cultures growing Staph aureus and GBS   - L foot wound anaerobic culture growing Prevotella and Porphyromonas Somerae. PO Flagyl added.    - L foot bone culture growing Staph species, continued IV ceftriaxone and vancomycin (per Pharmacy dosing).  - with acute renal insufficiency, vancomycin changed to daptomycin and ceftriaxone discontinued as not needed based on current cultures. Continuing Flagyl  -Continuing daptomycin and Flagyl; Estimated end date of daptomycin: 7/28/22. Estimated end date of Flagyl: 7/4/22    Acute renal insufficiency  Per med history, patient no longer taking ARB.  sCr up slightly and vanco level > 20.  Discontinued ARB, holding diuretic for now. Trial of  gentle hydration ineffective.  Discontinued vancomycin.  sCr improved, stable     Peripheral arterial disease  -PVD contributing to current non-healing foot wound. Arterial US shows LLE atherosclerotic disease, areas of high-grade stenosis within the L popliteal artery  -Continue home statin, ASA  -Vascular Surgery consulted - patient considering surgery (BKA); seems reluctant primarily due to the time he expects it to take to obtain a prosthetic limb. Wants to try conservative management with antibiotics.  -Plan for SNF for IV antibiotics, OT/PT  - Follow up with Podiatry, ID and Vascular Surgery    Asymptomatic Mobitz (type) I (Wenckebach's) atrioventricular block  -Bradycardic in ED but asymptomatic, chronic issue  -Repeat EKG 6/19:  BPM 44 Normal sinus rhythm - AV block, 2:1 - Nonspecific T wave abnormality   -avoid BB and chronotropic CCB    Chronic kidney disease, stage III (moderate)  -sCr baseline 1.1  -Continue to monitor  -sCr remained > baseline; near to baseline 6/26    Type 2 diabetes, uncontrolled, with neuropathy  -Last A1c 2020, repeat > 9%. Per patient, taking 68 units Lantus QHS along with metformin  mg and glipizide 10 mg daily (Possibly, he is not sure)  -Per Pharm med history: patient is no longer taking insulin  -Mildly hypoglycemic on admit with BG 63  -decreased dose of basal insulin to 15 units Levemir, Titrate up as needed. Added prandial insulin 6/19  -worsening hyperglycemia as oral agents wash out. Levemir increased to BID and aspart increased with meals.  - doses adjusted 6/21; increased for 6/23, and again for 6/25  - some hyperglycemia due to dietary indiscretion; decreased basal insulin 6/27    Essential hypertension  Continued losartan 100mg and HCTZ 25 mg, with hydralazine 25 mg PO q8h PRN  Added nifedipine 6/18  Consulted PharmD for med history: patient reports no longer taking isosorbide-hydralazine, losartan, and losartan-HCTZ  Discontinued ARB, held HCTZ due to increasing  sCr. Continued nifedipine for BP control.  Resuming diuretic now that renal insufficiency has resolved.        Active Hospital Problems    Diagnosis  POA    *Osteomyelitis of left lower extremity [M86.9]  Yes    Acute renal insufficiency [N28.9]  No    Foot pain, left [M79.672]  Yes    Peripheral arterial disease [I73.9]  Yes     Chronic    Asymptomatic Mobitz (type) I (Wenckebach's) atrioventricular block [I44.1]  Yes    Chronic kidney disease, stage III (moderate) [N18.30]  Yes    Type 2 diabetes, uncontrolled, with neuropathy [E11.40, E11.65]  Yes     Chronic    Essential hypertension [I10]  Yes     Chronic      Resolved Hospital Problems   No resolved problems to display.       Inpatient Medications Prescribed for Management of Current Problems:     Scheduled Meds:    aspirin  81 mg Oral Daily    atorvastatin  80 mg Oral Daily    DAPTOmycin (CUBICIN)  IV  8 mg/kg Intravenous Q24H    gabapentin  200 mg Oral BID    hydrALAZINE  25 mg Oral Once    [START ON 6/29/2022] hydrALAZINE  50 mg Oral Q8H    hydroCHLOROthiazide  12.5 mg Oral Daily    insulin aspart U-100  20 Units Subcutaneous TIDWM    insulin detemir U-100  20 Units Subcutaneous BID    Lactobacillus rhamnosus GG  1 capsule Oral BID    metroNIDAZOLE  500 mg Oral Q8H    mupirocin   Nasal BID    NIFEdipine  90 mg Oral Daily    sodium chloride 0.9%  10 mL Intravenous Q6H    tamsulosin  0.4 mg Oral Daily     Continuous Infusions:   As Needed: acetaminophen, albuterol-ipratropium, aluminum-magnesium hydroxide-simethicone, dextrose 10%, dextrose 10%, glucagon (human recombinant), glucose, glucose, insulin aspart U-100, melatonin, naloxone, ondansetron, oxyCODONE-acetaminophen, prochlorperazine, sars-cov-2 (covid-19), sodium chloride 0.9%, Flushing PICC Protocol **AND** sodium chloride 0.9% **AND** sodium chloride 0.9%    VTE Risk Mitigation (From admission, onward)         Ordered     IP VTE HIGH RISK PATIENT  Once         06/15/22 1957      Place sequential compression device  Until discontinued         06/15/22 1957                I have assessed these finding virtually using telemed platform and with assistance of bedside nurse     The attending portion of this evaluation, treatment, and documentation was performed per Salma Strauss MD via Telemedicine AudioVisual using the secure [employer] software platform with 2 way audio/video. The provider was located off-site and the patient is located in the hospital. The aforementioned video software was utilized to document the relevant history and physical exam.    Salma Strauss MD  Department of Hospital Medicine   [facility] - Telemetry Stepdown

## 2022-06-29 NOTE — ASSESSMENT & PLAN NOTE
-Last A1c 2020, repeat > 9%. Per patient, taking 68 units Lantus QHS along with metformin  mg and glipizide 10 mg daily (Possibly, he is not sure)  -Per Pharm med history: patient is no longer taking insulin  -Mildly hypoglycemic on admit with BG 63  -decreased dose of basal insulin to 15 units Levemir, Titrate up as needed. Added prandial insulin 6/19  -worsening hyperglycemia as oral agents wash out. Levemir increased to BID and aspart increased with meals.  - doses adjusted 6/21; increased for 6/23, and again for 6/25  - some hyperglycemia due to dietary indiscretion; decreased basal insulin 6/27

## 2022-06-29 NOTE — PLAN OF CARE
Pt tolerated session well, with pleasant demeanor.     Problem: Occupational Therapy  Goal: Occupational Therapy Goal  Description: Goals to be met by: 07-02-22     Patient will increase functional independence with ADLs by performing:    UE Dressing with Set-up Assistance.  LE Dressing with Minimal Assistance.  Grooming while seated with Set-up Assistance.  Toileting from bedside commode with Stand-by Assistance for hygiene and clothing management.   Supine to sit with Stand-by Assistance.  Stand pivot transfers with Stand-by Assistance with RW and PWB in left heel only  Toilet transfer to bedside commode with Stand-by Assistance.  Pt. To perform scoot pivot transfer with Supervision to and from the wheelchair    Outcome: Ongoing, Progressing

## 2022-06-29 NOTE — PLAN OF CARE
Spoke with Arlen (Ph: [phone] / Fax: [phone]) at [facility] who requested updated MD and PT/OT notes. Explained PT note is pending, but will fax the latest MD and OT notes (done). Will send PT note when available. Updated team.    2:51 PM  Faxed updated PT/OT notes to Arlen at [facility].    Toshia Dennis LMSW  [facility]  Ext. [phone]

## 2022-06-29 NOTE — PLAN OF CARE
Fast appeal;Auth escalation submitted to Manager Rachel Velazquez, [facility]  Case Management Social Worker   [facility]

## 2022-06-30 LAB
ALBUMIN SERPL BCP-MCNC: 2.7 G/DL (ref 3.5–5.2)
ALP SERPL-CCNC: 69 U/L (ref 55–135)
ALT SERPL W/O P-5'-P-CCNC: 13 U/L (ref 10–44)
ANION GAP SERPL CALC-SCNC: 7 MMOL/L (ref 8–16)
AST SERPL-CCNC: 15 U/L (ref 10–40)
BASOPHILS # BLD AUTO: 0.06 K/UL (ref 0–0.2)
BASOPHILS NFR BLD: 0.8 % (ref 0–1.9)
BILIRUB SERPL-MCNC: 0.2 MG/DL (ref 0.1–1)
BUN SERPL-MCNC: 27 MG/DL (ref 8–23)
CALCIUM SERPL-MCNC: 8.9 MG/DL (ref 8.7–10.5)
CHLORIDE SERPL-SCNC: 104 MMOL/L (ref 95–110)
CK SERPL-CCNC: 131 U/L (ref 20–200)
CO2 SERPL-SCNC: 25 MMOL/L (ref 23–29)
CREAT SERPL-MCNC: 1.6 MG/DL (ref 0.5–1.4)
DIFFERENTIAL METHOD: ABNORMAL
EOSINOPHIL # BLD AUTO: 0.9 K/UL (ref 0–0.5)
EOSINOPHIL NFR BLD: 11.2 % (ref 0–8)
ERYTHROCYTE [DISTWIDTH] IN BLOOD BY AUTOMATED COUNT: 16.7 % (ref 11.5–14.5)
EST. GFR  (AFRICAN AMERICAN): 48.7 ML/MIN/1.73 M^2
EST. GFR  (NON AFRICAN AMERICAN): 42.1 ML/MIN/1.73 M^2
GLUCOSE SERPL-MCNC: 251 MG/DL (ref 70–110)
HCT VFR BLD AUTO: 32.8 % (ref 40–54)
HGB BLD-MCNC: 10.5 G/DL (ref 14–18)
IMM GRANULOCYTES # BLD AUTO: 0.02 K/UL (ref 0–0.04)
IMM GRANULOCYTES NFR BLD AUTO: 0.3 % (ref 0–0.5)
LYMPHOCYTES # BLD AUTO: 1.6 K/UL (ref 1–4.8)
LYMPHOCYTES NFR BLD: 20.1 % (ref 18–48)
MCH RBC QN AUTO: 25 PG (ref 27–31)
MCHC RBC AUTO-ENTMCNC: 32 G/DL (ref 32–36)
MCV RBC AUTO: 78 FL (ref 82–98)
MONOCYTES # BLD AUTO: 0.7 K/UL (ref 0.3–1)
MONOCYTES NFR BLD: 9.2 % (ref 4–15)
NEUTROPHILS # BLD AUTO: 4.6 K/UL (ref 1.8–7.7)
NEUTROPHILS NFR BLD: 58.4 % (ref 38–73)
NRBC BLD-RTO: 0 /100 WBC
PLATELET # BLD AUTO: 375 K/UL (ref 150–450)
PMV BLD AUTO: 9.4 FL (ref 9.2–12.9)
POCT GLUCOSE: 129 MG/DL (ref 70–110)
POCT GLUCOSE: 209 MG/DL (ref 70–110)
POCT GLUCOSE: 254 MG/DL (ref 70–110)
POTASSIUM SERPL-SCNC: 4.7 MMOL/L (ref 3.5–5.1)
PROT SERPL-MCNC: 6.7 G/DL (ref 6–8.4)
RBC # BLD AUTO: 4.2 M/UL (ref 4.6–6.2)
SODIUM SERPL-SCNC: 136 MMOL/L (ref 136–145)
WBC # BLD AUTO: 7.83 K/UL (ref 3.9–12.7)

## 2022-06-30 PROCEDURE — 93010 ELECTROCARDIOGRAM REPORT: CPT | Mod: ,,, | Performed by: INTERNAL MEDICINE

## 2022-06-30 PROCEDURE — 27000207 HC ISOLATION

## 2022-06-30 PROCEDURE — 80053 COMPREHEN METABOLIC PANEL: CPT | Performed by: STUDENT IN AN ORGANIZED HEALTH CARE EDUCATION/TRAINING PROGRAM

## 2022-06-30 PROCEDURE — 25000003 PHARM REV CODE 250: Performed by: NURSE PRACTITIONER

## 2022-06-30 PROCEDURE — A4216 STERILE WATER/SALINE, 10 ML: HCPCS | Performed by: INTERNAL MEDICINE

## 2022-06-30 PROCEDURE — 20600001 HC STEP DOWN PRIVATE ROOM

## 2022-06-30 PROCEDURE — 25000003 PHARM REV CODE 250: Performed by: REGISTERED NURSE

## 2022-06-30 PROCEDURE — 99232 PR SUBSEQUENT HOSPITAL CARE,LEVL II: ICD-10-PCS | Mod: 95,,, | Performed by: INTERNAL MEDICINE

## 2022-06-30 PROCEDURE — 93005 ELECTROCARDIOGRAM TRACING: CPT

## 2022-06-30 PROCEDURE — 85025 COMPLETE CBC W/AUTO DIFF WBC: CPT | Performed by: STUDENT IN AN ORGANIZED HEALTH CARE EDUCATION/TRAINING PROGRAM

## 2022-06-30 PROCEDURE — 99233 PR SUBSEQUENT HOSPITAL CARE,LEVL III: ICD-10-PCS | Mod: ,,, | Performed by: PODIATRIST

## 2022-06-30 PROCEDURE — 99232 SBSQ HOSP IP/OBS MODERATE 35: CPT | Mod: 95,,, | Performed by: INTERNAL MEDICINE

## 2022-06-30 PROCEDURE — 25000003 PHARM REV CODE 250: Performed by: INTERNAL MEDICINE

## 2022-06-30 PROCEDURE — 63600175 PHARM REV CODE 636 W HCPCS: Performed by: PHYSICIAN ASSISTANT

## 2022-06-30 PROCEDURE — 25000003 PHARM REV CODE 250: Performed by: PHYSICIAN ASSISTANT

## 2022-06-30 PROCEDURE — 36415 COLL VENOUS BLD VENIPUNCTURE: CPT | Performed by: STUDENT IN AN ORGANIZED HEALTH CARE EDUCATION/TRAINING PROGRAM

## 2022-06-30 PROCEDURE — 25000003 PHARM REV CODE 250: Performed by: HOSPITALIST

## 2022-06-30 PROCEDURE — 99233 SBSQ HOSP IP/OBS HIGH 50: CPT | Mod: ,,, | Performed by: PODIATRIST

## 2022-06-30 PROCEDURE — 93010 EKG 12-LEAD: ICD-10-PCS | Mod: ,,, | Performed by: INTERNAL MEDICINE

## 2022-06-30 RX ORDER — OXYCODONE AND ACETAMINOPHEN 5; 325 MG/1; MG/1
1 TABLET ORAL EVERY 6 HOURS PRN
Qty: 15 TABLET | Refills: 0 | Status: SHIPPED | OUTPATIENT
Start: 2022-06-30 | End: 2022-07-08 | Stop reason: HOSPADM

## 2022-06-30 RX ORDER — HYDROCHLOROTHIAZIDE 25 MG/1
12.5 TABLET ORAL
Start: 2022-07-04 | End: 2022-07-08 | Stop reason: HOSPADM

## 2022-06-30 RX ADMIN — DAPTOMYCIN 995 MG: 350 INJECTION, POWDER, LYOPHILIZED, FOR SOLUTION INTRAVENOUS at 05:06

## 2022-06-30 RX ADMIN — INSULIN ASPART 3 UNITS: 100 INJECTION, SOLUTION INTRAVENOUS; SUBCUTANEOUS at 08:06

## 2022-06-30 RX ADMIN — INSULIN ASPART 20 UNITS: 100 INJECTION, SOLUTION INTRAVENOUS; SUBCUTANEOUS at 08:06

## 2022-06-30 RX ADMIN — INSULIN ASPART 20 UNITS: 100 INJECTION, SOLUTION INTRAVENOUS; SUBCUTANEOUS at 12:06

## 2022-06-30 RX ADMIN — HYDROCHLOROTHIAZIDE 12.5 MG: 12.5 TABLET ORAL at 11:06

## 2022-06-30 RX ADMIN — Medication 10 ML: at 05:06

## 2022-06-30 RX ADMIN — OXYCODONE HYDROCHLORIDE AND ACETAMINOPHEN 1 TABLET: 5; 325 TABLET ORAL at 05:06

## 2022-06-30 RX ADMIN — HYDRALAZINE HYDROCHLORIDE 25 MG: 25 TABLET, FILM COATED ORAL at 01:06

## 2022-06-30 RX ADMIN — METRONIDAZOLE 500 MG: 500 TABLET ORAL at 05:06

## 2022-06-30 RX ADMIN — METRONIDAZOLE 500 MG: 500 TABLET ORAL at 09:06

## 2022-06-30 RX ADMIN — NIFEDIPINE 90 MG: 30 TABLET, FILM COATED, EXTENDED RELEASE ORAL at 08:06

## 2022-06-30 RX ADMIN — INSULIN ASPART 2 UNITS: 100 INJECTION, SOLUTION INTRAVENOUS; SUBCUTANEOUS at 12:06

## 2022-06-30 RX ADMIN — Medication 10 ML: at 06:06

## 2022-06-30 RX ADMIN — METRONIDAZOLE 500 MG: 500 TABLET ORAL at 01:06

## 2022-06-30 RX ADMIN — TAMSULOSIN HYDROCHLORIDE 0.4 MG: 0.4 CAPSULE ORAL at 08:06

## 2022-06-30 RX ADMIN — Medication 10 ML: at 12:06

## 2022-06-30 RX ADMIN — HYDRALAZINE HYDROCHLORIDE 25 MG: 25 TABLET, FILM COATED ORAL at 05:06

## 2022-06-30 RX ADMIN — Medication 1 CAPSULE: at 08:06

## 2022-06-30 RX ADMIN — ASPIRIN 81 MG: 81 TABLET, COATED ORAL at 08:06

## 2022-06-30 RX ADMIN — ATORVASTATIN CALCIUM 80 MG: 20 TABLET, FILM COATED ORAL at 08:06

## 2022-06-30 RX ADMIN — HYDRALAZINE HYDROCHLORIDE 25 MG: 25 TABLET, FILM COATED ORAL at 09:06

## 2022-06-30 RX ADMIN — Medication 10 ML: at 01:06

## 2022-06-30 RX ADMIN — GABAPENTIN 200 MG: 100 CAPSULE ORAL at 08:06

## 2022-06-30 RX ADMIN — INSULIN ASPART 20 UNITS: 100 INJECTION, SOLUTION INTRAVENOUS; SUBCUTANEOUS at 05:06

## 2022-06-30 NOTE — PLAN OF CARE
Problem: Adult Inpatient Plan of Care  Goal: Plan of Care Review  Outcome: Ongoing, Progressing  Goal: Patient-Specific Goal (Individualized)  Outcome: Ongoing, Progressing  Goal: Optimal Comfort and Wellbeing  Outcome: Ongoing, Progressing     Problem: Diabetes Comorbidity  Goal: Blood Glucose Level Within Targeted Range  Outcome: Ongoing, Progressing     Problem: Fluid and Electrolyte Imbalance (Acute Kidney Injury/Impairment)  Goal: Fluid and Electrolyte Balance  Outcome: Ongoing, Progressing     Problem: Impaired Wound Healing  Goal: Optimal Wound Healing  Outcome: Ongoing, Progressing     Problem: Skin Injury Risk Increased  Goal: Skin Health and Integrity  Outcome: Ongoing, Progressing     POC reviewed. AAOX4. Pt run SB thru out shift. Episode of hypertensive. SNF decline due Abx. No complaint of discomfort or pain. Frequent safety checks performed. Call light and urinal @bedside. WCTM

## 2022-06-30 NOTE — ASSESSMENT & PLAN NOTE
Assessment: IDSA moderate to severe diabetic foot infection with left calcaneal osteomyelitis and overlying wound, possible significant PAD. Wound culture + for GBS and Staph aureus, bone culture pending. L arterial US + for significant stenoses, L MARIO 1.43.     Plan:  -No surgical intervention planned by podiatry. Other services have recommended major amputation, patient not amenable at this time. Attempting limb salvage with wound care and antibiotics.   -Antibiotic plan per ID.  -Appreciate vascular surgery recs: patient will eventually need major amputation.   -Heel protector boot LLE at all times while in bed or chair.   -WB to LLE for transfers only.   -Dressing changes by nursing.   -Podiatry will follow.   DC Instructions: Please order ambulatory referral to podiatry (Kostas) for followup within 2 weeks of discharge. WB LLE for transfers only. Dressing changes 3x/week: cleanse left foot wound and left posterior leg wound with saline, apply hydrofera blue ready to both, wrap foot and lower leg with cast padding and coban.

## 2022-06-30 NOTE — PLAN OF CARE
Problem: Adult Inpatient Plan of Care  Goal: Plan of Care Review  Outcome: Ongoing, Progressing     Problem: Adult Inpatient Plan of Care  Goal: Absence of Hospital-Acquired Illness or Injury  Outcome: Ongoing, Progressing     Problem: Adult Inpatient Plan of Care  Goal: Optimal Comfort and Wellbeing  Outcome: Ongoing, Progressing     POC reviewed with pt. Answered all questions. A&Ox4. Pt very quiet. Denies needs. Complained of pain around 0530 to LLE - PRN medication given with approval of on call JOHNYN Kaur with current HR. Bed in lowest position, call light within reach, non skid socks on, bed alarm set, instructed to call staff for needs - pt verbalized understanding.

## 2022-06-30 NOTE — PLAN OF CARE
06/30/22 1419   Post-Acute Status   Post-Acute Authorization Placement   Post-Acute Placement Status Set-up Complete/Auth obtained   Updated SNF orders were sent to Brandy at [facility].    Per Brandy roa [facility], nurse can call report to Racquel at [phone] he is going to room 24A. Nurse notified.    Spoke with team, therapy recommending stretcher due to needing two individuals to assist him to/from the w/c. SW met with patient at bedside and provided full update. Patient states he would rather be sent in a w/c van, and noted that his personal w/c is in the room and he prefers this method. Updated team.    QUINCY arranged wheelchair transport via [facility]. Requested  time is 3:35 PM. QUINCY did note to [facility] that patient requires x2 assistance. Requested  time does not guarantee arrival time.    2:38 PM  Received call from [facility]'s D.O.N. who reports they cannot accept if patient is on iv dapto. QUINCY updated team, inquiring if patent is able to have an alternate iv abx. QUINCY is in communication with [facility] to postpone transport request.    3:53 PM  Updated patient at bedside.    Toshia Dennis LMSW  [facility]  Ext. [phone]

## 2022-06-30 NOTE — PLAN OF CARE
Received voicemail from Priya at [facility] who reports SNF has been approved.    Attempted to contact Brandy ([phone]) in admissions at [facility] and left voicemail requesting return call.    10:00 AM  Spoke with Brandy at [facility] who states the approval they were sent by [facility] appears to have the incorrect admit date. It also states [facility] must send 72hrs worth of PT/OT/clinical updates by tomorrow, 7/1/22.    Attempted to contact Anaya (Ph: [phone], ext [phone] / Fax: [phone]) with [facility] and left voicemail requesting return call.    10:48 AM  Attempted to contact Renata at [facility] and left voicemail.    Escalated the above issue to leadership.    11:29 AM  Received call back from Priya at [facility] who attempted to transfer SW to another [facility] department, however, that number was disconnected.    Spoke with Brandy at [facility] who states she has left a voicemail and email for their [facility] representative regarding the admit date issue on the auth.    11:32 AM  Spoke with Anaya at [facility] and explained the above situation. Anaya states she will speak with her supervisor.    11:35 AM  Sent updated SNF orders to [facility].    1:23 PM  Spoke with Brandy at [facility] who reports they can accept patient if SW send the last 72hrs of clinicals. QUINCY sent the requested documentation to [facility]. [facility] reports if patient does not arrive tonight by midnight, the entire insurance authorization process must be restarted. QUINCY updated team.    Toshia Dennis, LORNA  [facility]  Ext. [phone]

## 2022-06-30 NOTE — ASSESSMENT & PLAN NOTE
-sCr baseline 1.1  -Continue to monitor  -sCr remained > baseline; near to baseline 6/26  -sCr 1.4 on 6/29; HCTZ was resumed on 6/27 for hypertension

## 2022-06-30 NOTE — PROGRESS NOTES
Aaron [facility] - Telemetry [facility] Medicine  Telemedicine Progress Note    Patient Name: Saji Castañeda  MRN: [id number]  Patient Class: IP- Inpatient   Admission Date: 6/15/2022  Length of Stay: 14 days  Attending Physician: Salma Strauss MD  Primary Care Provider: Mart Escalante MD    Subjective:     Principal Problem:Osteomyelitis of left lower extremity    HPI:  71 y.o. M with PMHx DM2 and PVD with chronic diabetic foot wounds s/p L sided BKA, HTN, asymptomatic bradycardia, CKD 3 and HLD who presented to the ED for worsening R foot pain and drainage. Pt. With known chronic ulceration/osteomylitis to L heel and ID has recommended BKA for definitive therapy, but the patient reports that he has not been interested in amputation. Today, he had his usual f/u appointment with ID, and the patient was referred to the ED for imaging with bone biopsy and potential abx because of worsening drainage and pain. Pt. Denies any current fevers, chills, nausea, or other systemic signs of infection.    Overview/Hospital Course:  Patient admitted to hospital medicine. Podiatry and vascular surgery consulted. Patient underwent bone biopsy and culture. Infectious disease consulted.     Telemedicine  This service was provided by Virtual Visit.    Patient was transferred to [facility] on:  6/18/2022    Chief Complaint   Patient presents with    Wound Check     Sent from podiatry and stated in pain. L toes amputated     The patient location is: [id number] A   Admitted 6/15/2022  2:10 PM  Present with the patient at the time of the telemed/virtual assessment: Telepresenter    Interval History / Events Overnight:   The patient is able to provide adequate history. Additional history was obtained from past medical records. No significant events reported by Nursing.   Patient complains of nothing specific. Symptoms have been unchanged since yesterday. Associated symptoms include: fatigue. Symptoms are stable.     Lab  test(s) reviewed:  sCr stable.    Review of Systems   Constitutional:  Negative for fever.   Respiratory:  Negative for shortness of breath.    Gastrointestinal:  Negative for diarrhea.     Objective:     Vital Signs (Most Recent):  Temp: 98.3 °F (36.8 °C) (06/29/22 1105)  Pulse: (!) 52 (06/29/22 1105)  Resp: 18 (06/29/22 1105)  BP: 138/61 (06/29/22 1105)  SpO2: 96 % (06/29/22 1105)   Vital Signs (24h Range):  Temp:  [97.1 °F (36.2 °C)-98.4 °F (36.9 °C)] 98.3 °F (36.8 °C)  Pulse:  [52-88] 52  Resp:  [18-20] 18  SpO2:  [96 %-98 %] 96 %  BP: (138-201)/(61-84) 138/61     Weight: 124.3 kg (274 lb 0.5 oz)  Body mass index is 40.47 kg/m².    Intake/Output Summary (Last 24 hours) at 6/29/2022 1121  Last data filed at 6/29/2022 1105  Gross per 24 hour   Intake 120 ml   Output 1100 ml   Net -980 ml        Physical Exam  Constitutional:       General: He is not in acute distress.     Appearance: Normal appearance.   Eyes:      General: Lids are normal. No scleral icterus.        Right eye: No discharge.         Left eye: No discharge.      Conjunctiva/sclera: Conjunctivae normal.   Neck:      Trachea: Phonation normal.   Cardiovascular:      Rate and Rhythm: Bradycardia present.      Comments: Monitor / Vital signs reviewed at time of visit  Pulmonary:      Effort: Pulmonary effort is normal. No tachypnea, accessory muscle usage or respiratory distress.   Abdominal:      General: There is no distension.   Musculoskeletal:      Left foot: Deformity (s/p partial amputation) present.   Skin:     Coloration: Skin is not cyanotic.   Neurological:      Mental Status: He is alert. He is not disoriented.   Psychiatric:         Attention and Perception: Attention normal.         Behavior: Behavior is cooperative.       Significant Labs:   Recent Labs   Lab 06/16/22  1100   HGBA1C 9.5*       Recent Labs   Lab 06/28/22  2112 06/29/22  0735 06/29/22  1106   POCTGLUCOSE 230* 227* 226*       Recent Labs   Lab 06/24/22  0545 06/26/22  0538  06/28/22  0621   WBC 7.24 8.49 8.71   HGB 10.1* 10.6* 10.8*   HCT 32.3* 34.3* 34.0*    371 364       Recent Labs   Lab 06/24/22  0545 06/26/22  0538 06/28/22  0621   GRAN 64.1  4.6 67.1  5.7 64.4  5.6   LYMPH 19.3  1.4 16.8*  1.4 17.6*  1.5   MONO 9.0  0.7 8.6  0.7 8.7  0.8   EOS 0.5 0.6* 0.7*       Recent Labs   Lab 06/23/22  0951 06/24/22  0545 06/26/22  0538 06/27/22  0520 06/28/22  0621   * 133* 137 136 137   K 4.2 4.4 4.1 4.2 4.5   CL 99 100 103 104 104   CO2 28 24 25 24 23   BUN 27* 24* 22 23 22   CREATININE 1.6* 1.5* 1.3 1.4 1.4   * 229* 215* 168* 174*   CALCIUM 9.1 8.9 8.7 8.7 8.8   ALBUMIN 2.6* 2.6* 2.5*  --  2.6*   MG  --   --   --  1.7  --    PHOS 3.2  --   --   --   --        Recent Labs   Lab 06/24/22  0545 06/26/22  0538 06/28/22  0621   ALKPHOS 71 69 66   ALT 10 14 14   AST 18 19 17   PROT 6.5 7.1 6.6   BILITOT 0.2 0.2 0.3       Recent Labs   Lab 02/23/22  1407 06/15/22  1633   LACTATE  --  1.4   SEDRATE >120* >120*       SARS-CoV2 (COVID-19) Qualitative PCR (no units)   Date Value   03/29/2021 Not Detected   03/15/2021 Not Detected   03/02/2021 Not Detected   02/22/2021 Not Detected   01/29/2021 Not Detected     SARS-CoV-2 RNA, Amplification, Qual (no units)   Date Value   02/03/2021 Negative     POC Rapid COVID (no units)   Date Value   06/15/2022 Negative   02/18/2021 Negative   12/08/2020 Negative     X-Ray Chest 1 View S/P PICC Line by Nursing  Narrative: EXAMINATION:  XR CHEST 1 VIEW S/P PICC LINE BY NURSING    CLINICAL HISTORY:  s/p picc placement;    TECHNIQUE:  Single frontal view of chest.    COMPARISON:  March 3, 2021    FINDINGS:  Right PICC line now evident, tip superimposing lower superior vena caval soft tissues.  Normal heart size.  Normal pulmonary vasculature.  No focal infiltrates.  No pleural fluid or pneumothorax.  No acute bony findings.  Impression: 1. Right PICC line tip superimposes lower superior vena caval soft tissues.  2. No active cardiac or  pulmonary findings    Electronically signed by: John Thomas  Date:    06/24/2022  Time:    12:31      Labs and Imaging within the last 24 hours listed above were reviewed.       Diet: Diet diabetic [facility]; 2000 Calorie; Cardiac (Low Na/Chol)  Diet diabetic  Significant LDAs:   IV Access Type: Peripheral  Urinary Catheter Indication if present: Patient Does Not Have Urinary Catheter  Other Lines/Tubes/Drains:    HIGH RISK CONDITION(S):   Patient has a condition that poses threat to life and bodily function: limb ischemia likely to prevent wound healing      Goals of Care:    Previous admission:  2/17/21  Likely prognosis:  Fair  Code Status: Full Code  Comfort Only: No  Hospice: No  Goals at discharge: remain at home, with physician follow-up    Discharge Planning:  OXANA: 6/29/2022     Code Status: Full Code   Is the patient medically ready for discharge?: Yes    Reason for patient still in hospital (select all that apply): Patient trending condition, Treatment, and Pending disposition  Discharge Plan A: Skilled Nursing Facility       Assessment/Plan:      * Osteomyelitis of left lower extremity  -With acute on chronic osteomyleitis of LLE including heel with purulent foul smelling drainage  -Podiatry consulted, Not septic appearing. S/p bone biopsy.  -ID consulted. Cultures growing Staph aureus and GBS   - L foot wound anaerobic culture growing Prevotella and Porphyromonas Somerae. PO Flagyl added.    - L foot bone culture growing Staph species, continued IV ceftriaxone and vancomycin (per Pharmacy dosing).  - with acute renal insufficiency, vancomycin changed to daptomycin and ceftriaxone discontinued as not needed based on current cultures. Continuing Flagyl  -Continuing daptomycin and Flagyl; Estimated end date of daptomycin: 7/28/22. Estimated end date of Flagyl: 7/4/22    Acute renal insufficiency  Per med history, patient no longer taking ARB.  sCr up slightly and vanco level > 20.  Discontinued ARB,  holding diuretic for now. Trial of gentle hydration ineffective.  Discontinued vancomycin.  sCr improved, stable     Peripheral arterial disease  -PVD contributing to current non-healing foot wound. Arterial US shows LLE atherosclerotic disease, areas of high-grade stenosis within the L popliteal artery  -Continue home statin, ASA  -Vascular Surgery consulted - patient considering surgery (BKA); seems reluctant primarily due to the time he expects it to take to obtain a prosthetic limb. Wants to try conservative management with antibiotics.  -Plan for SNF for IV antibiotics, OT/PT  - Follow up with Podiatry, ID and Vascular Surgery    Asymptomatic Mobitz (type) I (Wenckebach's) atrioventricular block  -Bradycardic in ED but asymptomatic, chronic issue  -Repeat EKG 6/19:  BPM 44 Normal sinus rhythm - AV block, 2:1 - Nonspecific T wave abnormality   -avoid BB and chronotropic CCB    Chronic kidney disease, stage III (moderate)  -sCr baseline 1.1  -Continue to monitor  -sCr remained > baseline; near to baseline 6/26  -sCr 1.4 on 6/29; HCTZ was resumed on 6/27 for hypertension    Type 2 diabetes, uncontrolled, with neuropathy  -Last A1c 2020, repeat > 9%. Per patient, taking 68 units Lantus QHS along with metformin  mg and glipizide 10 mg daily (Possibly, he is not sure)  -Per Pharm med history: patient is no longer taking insulin  -Mildly hypoglycemic on admit with BG 63  -decreased dose of basal insulin to 15 units Levemir, Titrate up as needed. Added prandial insulin 6/19  -worsening hyperglycemia as oral agents wash out. Levemir increased to BID and aspart increased with meals.  - doses adjusted 6/21; increased for 6/23, and again for 6/25  - some hyperglycemia due to dietary indiscretion; decreased basal insulin 6/27  - BGs controlled when patient adheres to diet (no regular sodas)    Essential hypertension  Continued losartan 100mg and HCTZ 25 mg, with hydralazine 25 mg PO q8h PRN  Added nifedipine  6/18  Consulted PharmD for med history: patient reports no longer taking isosorbide-hydralazine, losartan, and losartan-HCTZ  Discontinued ARB, held HCTZ due to increasing sCr. Continued nifedipine for BP control.  Resumed diuretic now that renal insufficiency improved; may not be able to tolerate.   Hydralazine dose changed multiple times; continue to adjust.        Active Hospital Problems    Diagnosis  POA    *Osteomyelitis of left lower extremity [M86.9]  Yes    Acute renal insufficiency [N28.9]  No    Foot pain, left [M79.672]  Yes    Peripheral arterial disease [I73.9]  Yes     Chronic    Asymptomatic Mobitz (type) I (Wenckebach's) atrioventricular block [I44.1]  Yes    Chronic kidney disease, stage III (moderate) [N18.30]  Yes    Type 2 diabetes, uncontrolled, with neuropathy [E11.40, E11.65]  Yes     Chronic    Essential hypertension [I10]  Yes     Chronic      Resolved Hospital Problems   No resolved problems to display.       Inpatient Medications Prescribed for Management of Current Problems:     Scheduled Meds:    aspirin  81 mg Oral Daily    atorvastatin  80 mg Oral Daily    DAPTOmycin (CUBICIN)  IV  8 mg/kg Intravenous Q24H    gabapentin  200 mg Oral BID    hydrALAZINE  25 mg Oral Q8H    hydroCHLOROthiazide  12.5 mg Oral Daily    insulin aspart U-100  20 Units Subcutaneous TIDWM    insulin detemir U-100  20 Units Subcutaneous BID    Lactobacillus rhamnosus GG  1 capsule Oral BID    metroNIDAZOLE  500 mg Oral Q8H    NIFEdipine  90 mg Oral Daily    sodium chloride 0.9%  10 mL Intravenous Q6H    tamsulosin  0.4 mg Oral Daily     Continuous Infusions:   As Needed: acetaminophen, albuterol-ipratropium, aluminum-magnesium hydroxide-simethicone, dextrose 10%, dextrose 10%, glucagon (human recombinant), glucose, glucose, hydrALAZINE, insulin aspart U-100, melatonin, naloxone, ondansetron, oxyCODONE-acetaminophen, prochlorperazine, sars-cov-2 (covid-19), sodium chloride 0.9%, Flushing PICC  Protocol **AND** sodium chloride 0.9% **AND** sodium chloride 0.9%    VTE Risk Mitigation (From admission, onward)         Ordered     IP VTE HIGH RISK PATIENT  Once         06/15/22 1957     Place sequential compression device  Until discontinued         06/15/22 1957              I have assessed these finding virtually using telemed platform and with assistance of bedside nurse     The attending portion of this evaluation, treatment, and documentation was performed per Salma Strauss MD via Telemedicine AudioVisual using the secure "[employer]" software platform with 2 way audio/video. The provider was located off-site and the patient is located in the hospital. The aforementioned video software was utilized to document the relevant history and physical exam    Salma Strauss MD  Department of Hospital Medicine   [facility] - Telemetry Stepdown

## 2022-06-30 NOTE — ASSESSMENT & PLAN NOTE
-Last A1c 2020, repeat > 9%. Per patient, taking 68 units Lantus QHS along with metformin  mg and glipizide 10 mg daily (Possibly, he is not sure)  -Per Pharm med history: patient is no longer taking insulin  -Mildly hypoglycemic on admit with BG 63  -decreased dose of basal insulin to 15 units Levemir, Titrate up as needed. Added prandial insulin 6/19  -worsening hyperglycemia as oral agents wash out. Levemir increased to BID and aspart increased with meals.  - doses adjusted 6/21; increased for 6/23, and again for 6/25  - some hyperglycemia due to dietary indiscretion; decreased basal insulin 6/27  - BGs controlled when patient adheres to diet (no regular sodas)

## 2022-06-30 NOTE — SUBJECTIVE & OBJECTIVE
Subjective:     Interval History: Vitals stable. No new pedal complaints. Dressings clean dry, intact. Now with mild left ankle pain over past day.       Scheduled Meds:   aspirin  81 mg Oral Daily    atorvastatin  80 mg Oral Daily    DAPTOmycin (CUBICIN)  IV  8 mg/kg Intravenous Q24H    gabapentin  200 mg Oral BID    hydrALAZINE  25 mg Oral Q8H    hydroCHLOROthiazide  12.5 mg Oral Daily    insulin aspart U-100  20 Units Subcutaneous TIDWM    insulin detemir U-100  20 Units Subcutaneous BID    Lactobacillus rhamnosus GG  1 capsule Oral BID    metroNIDAZOLE  500 mg Oral Q8H    NIFEdipine  90 mg Oral Daily    sodium chloride 0.9%  10 mL Intravenous Q6H    tamsulosin  0.4 mg Oral Daily     Continuous Infusions:  PRN Meds:acetaminophen, albuterol-ipratropium, aluminum-magnesium hydroxide-simethicone, dextrose 10%, dextrose 10%, glucagon (human recombinant), glucose, glucose, hydrALAZINE, insulin aspart U-100, melatonin, naloxone, ondansetron, oxyCODONE-acetaminophen, prochlorperazine, sars-cov-2 (covid-19), sodium chloride 0.9%, Flushing PICC Protocol **AND** sodium chloride 0.9% **AND** sodium chloride 0.9%    Review of Systems   Constitutional:  Negative for activity change, chills, fever and unexpected weight change.   HENT:  Negative for congestion and sore throat.    Respiratory:  Negative for cough, shortness of breath and wheezing.    Cardiovascular:  Negative for chest pain, palpitations and leg swelling.   Gastrointestinal:  Negative for abdominal pain, blood in stool, nausea and vomiting.   Genitourinary:  Negative for dysuria and hematuria.   Musculoskeletal:  Positive for gait problem. Negative for arthralgias and neck pain.   Skin:  Positive for wound. Negative for color change and rash.   Neurological:  Positive for numbness. Negative for dizziness and seizures.   Psychiatric/Behavioral:  Negative for hallucinations and suicidal ideas.    All other systems reviewed and are negative.  Objective:     Vital  Signs (Most Recent):  Temp: 98.3 °F (36.8 °C) (06/30/22 1116)  Pulse: (!) 50 (06/30/22 1116)  Resp: 18 (06/30/22 1116)  BP: (!) 178/76 (06/30/22 1116)  SpO2: 96 % (06/30/22 1237)   Vital Signs (24h Range):  Temp:  [98 °F (36.7 °C)-98.8 °F (37.1 °C)] 98.3 °F (36.8 °C)  Pulse:  [44-69] 50  Resp:  [15-20] 18  SpO2:  [92 %-96 %] 96 %  BP: (146-178)/(67-76) 178/76     Weight: 124.3 kg (274 lb 0.5 oz)  Body mass index is 40.47 kg/m².    Foot Exam    General  Orientation: alert and oriented to person, place, and time   Affect: appropriate       Right Foot/Ankle     Inspection and Palpation  Ecchymosis: none  Tenderness: none   Swelling: (BLE edema)  Skin Exam: skin intact;     Neurovascular  Dorsalis pedis: doppler  Posterior tibial: doppler  Saphenous nerve sensation: diminished  Tibial nerve sensation: diminished  Superficial peroneal nerve sensation: diminished  Deep peroneal nerve sensation: diminished  Sural nerve sensation: diminished      Left Foot/Ankle      Inspection and Palpation  Ecchymosis: none  Tenderness: (Tenderness to the posterior heel wound)  Swelling: (significant b/l LE edema)  Skin Exam: skin changes and ulcer; no drainage (resolved)     Neurovascular  Dorsalis pedis: doppler  Posterior tibial: doppler  Saphenous nerve sensation: diminished  Tibial nerve sensation: diminished  Superficial peroneal nerve sensation: diminished  Deep peroneal nerve sensation: diminished  Sural nerve sensation: diminished    Comments  -Left Chopart amputation  --Left posterior heel wound down to subcutaneous tissue with thin soft tissue covering overlying calcaneus. Wound bed viable. Serous drainage now resolved. Mild tenderness. Periwound maceration improved. Periwound callus.  -Left ankle mild pain with tib-fib squeeze but not with PROM of ankle or end range of ankle.     Laboratory:  Blood Cultures: No results for input(s): LABBLOO in the last 48 hours.  CBC:   Recent Labs   Lab 06/30/22  0541   WBC 7.83   RBC 4.20*    HGB 10.5*   HCT 32.8*      MCV 78*   MCH 25.0*   MCHC 32.0     CMP:   Recent Labs   Lab 06/30/22  0541   *   CALCIUM 8.9   ALBUMIN 2.7*   PROT 6.7      K 4.7   CO2 25      BUN 27*   CREATININE 1.6*   ALKPHOS 69   ALT 13   AST 15   BILITOT 0.2     CRP: No results for input(s): CRP in the last 168 hours.  ESR: No results for input(s): SEDRATE in the last 168 hours.  Microbiology Results (last 7 days)       Procedure Component Value Units Date/Time    Clostridium difficile EIA [[id number]]     Order Status: Canceled Specimen: Stool     Stool culture [[id number]]     Order Status: No result Specimen: Stool           Specimen (24h ago, onward)                None              Clinical Findings:  As above

## 2022-06-30 NOTE — ASSESSMENT & PLAN NOTE
Continued losartan 100mg and HCTZ 25 mg, with hydralazine 25 mg PO q8h PRN  Added nifedipine 6/18  Consulted PharmD for med history: patient reports no longer taking isosorbide-hydralazine, losartan, and losartan-HCTZ  Discontinued ARB, held HCTZ due to increasing sCr. Continued nifedipine for BP control.  Resumed diuretic now that renal insufficiency improved; may not be able to tolerate.   Hydralazine dose changed multiple times; continue to adjust.

## 2022-06-30 NOTE — PROGRESS NOTES
[facility] - Telemetry Stepdown  Podiatry  Progress Note    Patient Name: Saji Castañeda  MRN: [id number]  Admission Date: 6/15/2022  Hospital Length of Stay: 15 days  Attending Physician: Keerthi Mayorga MD  Primary Care Provider: Mart Escalante MD     Subjective:     Interval History: Vitals stable. No new pedal complaints. Dressings clean dry, intact. Now with mild left ankle pain over past day.       Scheduled Meds:   aspirin  81 mg Oral Daily    atorvastatin  80 mg Oral Daily    DAPTOmycin (CUBICIN)  IV  8 mg/kg Intravenous Q24H    gabapentin  200 mg Oral BID    hydrALAZINE  25 mg Oral Q8H    hydroCHLOROthiazide  12.5 mg Oral Daily    insulin aspart U-100  20 Units Subcutaneous TIDWM    insulin detemir U-100  20 Units Subcutaneous BID    Lactobacillus rhamnosus GG  1 capsule Oral BID    metroNIDAZOLE  500 mg Oral Q8H    NIFEdipine  90 mg Oral Daily    sodium chloride 0.9%  10 mL Intravenous Q6H    tamsulosin  0.4 mg Oral Daily     Continuous Infusions:  PRN Meds:acetaminophen, albuterol-ipratropium, aluminum-magnesium hydroxide-simethicone, dextrose 10%, dextrose 10%, glucagon (human recombinant), glucose, glucose, hydrALAZINE, insulin aspart U-100, melatonin, naloxone, ondansetron, oxyCODONE-acetaminophen, prochlorperazine, sars-cov-2 (covid-19), sodium chloride 0.9%, Flushing PICC Protocol **AND** sodium chloride 0.9% **AND** sodium chloride 0.9%    Review of Systems   Constitutional:  Negative for activity change, chills, fever and unexpected weight change.   HENT:  Negative for congestion and sore throat.    Respiratory:  Negative for cough, shortness of breath and wheezing.    Cardiovascular:  Negative for chest pain, palpitations and leg swelling.   Gastrointestinal:  Negative for abdominal pain, blood in stool, nausea and vomiting.   Genitourinary:  Negative for dysuria and hematuria.   Musculoskeletal:  Positive for gait problem. Negative for arthralgias and neck pain.   Skin:  Positive for  wound. Negative for color change and rash.   Neurological:  Positive for numbness. Negative for dizziness and seizures.   Psychiatric/Behavioral:  Negative for hallucinations and suicidal ideas.    All other systems reviewed and are negative.  Objective:     Vital Signs (Most Recent):  Temp: 98.3 °F (36.8 °C) (06/30/22 1116)  Pulse: (!) 50 (06/30/22 1116)  Resp: 18 (06/30/22 1116)  BP: (!) 178/76 (06/30/22 1116)  SpO2: 96 % (06/30/22 1237)   Vital Signs (24h Range):  Temp:  [98 °F (36.7 °C)-98.8 °F (37.1 °C)] 98.3 °F (36.8 °C)  Pulse:  [44-69] 50  Resp:  [15-20] 18  SpO2:  [92 %-96 %] 96 %  BP: (146-178)/(67-76) 178/76     Weight: 124.3 kg (274 lb 0.5 oz)  Body mass index is 40.47 kg/m².    Foot Exam    General  Orientation: alert and oriented to person, place, and time   Affect: appropriate       Right Foot/Ankle     Inspection and Palpation  Ecchymosis: none  Tenderness: none   Swelling: (BLE edema)  Skin Exam: skin intact;     Neurovascular  Dorsalis pedis: doppler  Posterior tibial: doppler  Saphenous nerve sensation: diminished  Tibial nerve sensation: diminished  Superficial peroneal nerve sensation: diminished  Deep peroneal nerve sensation: diminished  Sural nerve sensation: diminished      Left Foot/Ankle      Inspection and Palpation  Ecchymosis: none  Tenderness: (Tenderness to the posterior heel wound)  Swelling: (significant b/l LE edema)  Skin Exam: skin changes and ulcer; no drainage (resolved)     Neurovascular  Dorsalis pedis: doppler  Posterior tibial: doppler  Saphenous nerve sensation: diminished  Tibial nerve sensation: diminished  Superficial peroneal nerve sensation: diminished  Deep peroneal nerve sensation: diminished  Sural nerve sensation: diminished    Comments  -Left Chopart amputation  --Left posterior heel wound down to subcutaneous tissue with thin soft tissue covering overlying calcaneus. Wound bed viable. Serous drainage now resolved. Mild tenderness. Periwound maceration improved.  Periwound callus.  -Left ankle mild pain with tib-fib squeeze but not with PROM of ankle or end range of ankle.     Laboratory:  Blood Cultures: No results for input(s): LABBLOO in the last 48 hours.  CBC:   Recent Labs   Lab 06/30/22  0541   WBC 7.83   RBC 4.20*   HGB 10.5*   HCT 32.8*      MCV 78*   MCH 25.0*   MCHC 32.0     CMP:   Recent Labs   Lab 06/30/22  0541   *   CALCIUM 8.9   ALBUMIN 2.7*   PROT 6.7      K 4.7   CO2 25      BUN 27*   CREATININE 1.6*   ALKPHOS 69   ALT 13   AST 15   BILITOT 0.2     CRP: No results for input(s): CRP in the last 168 hours.  ESR: No results for input(s): SEDRATE in the last 168 hours.  Microbiology Results (last 7 days)       Procedure Component Value Units Date/Time    Clostridium difficile EIA [[id number]]     Order Status: Canceled Specimen: Stool     Stool culture [[id number]]     Order Status: No result Specimen: Stool           Specimen (24h ago, onward)                None              Clinical Findings:  As above                Assessment/Plan:     * Osteomyelitis of left lower extremity  Assessment: IDSA moderate to severe diabetic foot infection with left calcaneal osteomyelitis and overlying wound, possible significant PAD. Wound culture + for GBS and Staph aureus, bone culture pending. L arterial US + for significant stenoses, L MARIO 1.43.     Plan:  -No surgical intervention planned by podiatry. Other services have recommended major amputation, patient not amenable at this time. Attempting limb salvage with wound care and antibiotics.   -Antibiotic plan per ID.  -Appreciate vascular surgery recs: patient will eventually need major amputation.   -Heel protector boot LLE at all times while in bed or chair.   -WB to LLE for transfers only.   -Dressing changes by nursing.   -Podiatry will follow.   DC Instructions: Please order ambulatory referral to podiatry ([facility]) for followup within 2 weeks of discharge. WB LLE for transfers only. Dressing  changes 3x/week: cleanse left foot wound and left posterior leg wound with saline, apply hydrofera blue ready to both, wrap foot and lower leg with cast padding and coban.           Ortiz John DPM PGY-2  Podiatric Medicine & Surgery  [facility]  Secure Chat Preferred  Mobile: [phone]  Pager: [phone]

## 2022-07-01 LAB
ALBUMIN SERPL BCP-MCNC: 2.7 G/DL (ref 3.5–5.2)
ANION GAP SERPL CALC-SCNC: 8 MMOL/L (ref 8–16)
BUN SERPL-MCNC: 27 MG/DL (ref 8–23)
CALCIUM SERPL-MCNC: 9.5 MG/DL (ref 8.7–10.5)
CHLORIDE SERPL-SCNC: 104 MMOL/L (ref 95–110)
CO2 SERPL-SCNC: 24 MMOL/L (ref 23–29)
CREAT SERPL-MCNC: 1.4 MG/DL (ref 0.5–1.4)
EST. GFR  (AFRICAN AMERICAN): 57.2 ML/MIN/1.73 M^2
EST. GFR  (NON AFRICAN AMERICAN): 49.5 ML/MIN/1.73 M^2
GLUCOSE SERPL-MCNC: 165 MG/DL (ref 70–110)
PHOSPHATE SERPL-MCNC: 4 MG/DL (ref 2.7–4.5)
POCT GLUCOSE: 159 MG/DL (ref 70–110)
POCT GLUCOSE: 188 MG/DL (ref 70–110)
POCT GLUCOSE: 190 MG/DL (ref 70–110)
POCT GLUCOSE: 224 MG/DL (ref 70–110)
POCT GLUCOSE: 264 MG/DL (ref 70–110)
POTASSIUM SERPL-SCNC: 4.4 MMOL/L (ref 3.5–5.1)
SODIUM SERPL-SCNC: 136 MMOL/L (ref 136–145)

## 2022-07-01 PROCEDURE — 25000003 PHARM REV CODE 250: Performed by: NURSE PRACTITIONER

## 2022-07-01 PROCEDURE — 80069 RENAL FUNCTION PANEL: CPT | Performed by: INTERNAL MEDICINE

## 2022-07-01 PROCEDURE — 97112 NEUROMUSCULAR REEDUCATION: CPT

## 2022-07-01 PROCEDURE — 99232 SBSQ HOSP IP/OBS MODERATE 35: CPT | Mod: 95,,, | Performed by: INTERNAL MEDICINE

## 2022-07-01 PROCEDURE — 20600001 HC STEP DOWN PRIVATE ROOM

## 2022-07-01 PROCEDURE — 25000003 PHARM REV CODE 250: Performed by: REGISTERED NURSE

## 2022-07-01 PROCEDURE — 27000207 HC ISOLATION

## 2022-07-01 PROCEDURE — 25000003 PHARM REV CODE 250: Performed by: INTERNAL MEDICINE

## 2022-07-01 PROCEDURE — 63600175 PHARM REV CODE 636 W HCPCS: Performed by: PHYSICIAN ASSISTANT

## 2022-07-01 PROCEDURE — 97530 THERAPEUTIC ACTIVITIES: CPT

## 2022-07-01 PROCEDURE — 25000003 PHARM REV CODE 250: Performed by: PHYSICIAN ASSISTANT

## 2022-07-01 PROCEDURE — 25000003 PHARM REV CODE 250: Performed by: HOSPITALIST

## 2022-07-01 PROCEDURE — 99232 PR SUBSEQUENT HOSPITAL CARE,LEVL II: ICD-10-PCS | Mod: 95,,, | Performed by: INTERNAL MEDICINE

## 2022-07-01 PROCEDURE — 36415 COLL VENOUS BLD VENIPUNCTURE: CPT | Performed by: INTERNAL MEDICINE

## 2022-07-01 PROCEDURE — A4216 STERILE WATER/SALINE, 10 ML: HCPCS | Performed by: INTERNAL MEDICINE

## 2022-07-01 RX ORDER — METRONIDAZOLE 500 MG/1
500 TABLET ORAL EVERY 8 HOURS
Status: COMPLETED | OUTPATIENT
Start: 2022-07-01 | End: 2022-07-04

## 2022-07-01 RX ADMIN — INSULIN ASPART 20 UNITS: 100 INJECTION, SOLUTION INTRAVENOUS; SUBCUTANEOUS at 06:07

## 2022-07-01 RX ADMIN — HYDRALAZINE HYDROCHLORIDE 25 MG: 25 TABLET, FILM COATED ORAL at 05:07

## 2022-07-01 RX ADMIN — DAPTOMYCIN 995 MG: 350 INJECTION, POWDER, LYOPHILIZED, FOR SOLUTION INTRAVENOUS at 05:07

## 2022-07-01 RX ADMIN — Medication 10 ML: at 12:07

## 2022-07-01 RX ADMIN — HYDRALAZINE HYDROCHLORIDE 25 MG: 25 TABLET, FILM COATED ORAL at 01:07

## 2022-07-01 RX ADMIN — METRONIDAZOLE 500 MG: 500 TABLET ORAL at 05:07

## 2022-07-01 RX ADMIN — ASPIRIN 81 MG: 81 TABLET, COATED ORAL at 08:07

## 2022-07-01 RX ADMIN — Medication 10 ML: at 10:07

## 2022-07-01 RX ADMIN — INSULIN ASPART 20 UNITS: 100 INJECTION, SOLUTION INTRAVENOUS; SUBCUTANEOUS at 01:07

## 2022-07-01 RX ADMIN — Medication 10 ML: at 06:07

## 2022-07-01 RX ADMIN — INSULIN ASPART 3 UNITS: 100 INJECTION, SOLUTION INTRAVENOUS; SUBCUTANEOUS at 06:07

## 2022-07-01 RX ADMIN — INSULIN ASPART 20 UNITS: 100 INJECTION, SOLUTION INTRAVENOUS; SUBCUTANEOUS at 08:07

## 2022-07-01 RX ADMIN — Medication 1 CAPSULE: at 10:07

## 2022-07-01 RX ADMIN — Medication 10 ML: at 05:07

## 2022-07-01 RX ADMIN — GABAPENTIN 200 MG: 100 CAPSULE ORAL at 10:07

## 2022-07-01 RX ADMIN — INSULIN ASPART 1 UNITS: 100 INJECTION, SOLUTION INTRAVENOUS; SUBCUTANEOUS at 10:07

## 2022-07-01 RX ADMIN — Medication 1 CAPSULE: at 08:07

## 2022-07-01 RX ADMIN — METRONIDAZOLE 500 MG: 500 TABLET ORAL at 10:07

## 2022-07-01 RX ADMIN — NIFEDIPINE 90 MG: 30 TABLET, FILM COATED, EXTENDED RELEASE ORAL at 08:07

## 2022-07-01 RX ADMIN — GABAPENTIN 200 MG: 100 CAPSULE ORAL at 08:07

## 2022-07-01 RX ADMIN — TAMSULOSIN HYDROCHLORIDE 0.4 MG: 0.4 CAPSULE ORAL at 08:07

## 2022-07-01 RX ADMIN — METRONIDAZOLE 500 MG: 500 TABLET ORAL at 01:07

## 2022-07-01 RX ADMIN — Medication 10 ML: at 01:07

## 2022-07-01 RX ADMIN — HYDRALAZINE HYDROCHLORIDE 25 MG: 25 TABLET, FILM COATED ORAL at 10:07

## 2022-07-01 RX ADMIN — ATORVASTATIN CALCIUM 80 MG: 20 TABLET, FILM COATED ORAL at 08:07

## 2022-07-01 NOTE — ASSESSMENT & PLAN NOTE
Continued losartan 100mg and HCTZ 25 mg, with hydralazine 25 mg PO q8h PRN  Added nifedipine 6/18  Consulted PharmD for med history: patient reports no longer taking isosorbide-hydralazine, losartan, and losartan-HCTZ  Discontinued ARB, held HCTZ due to increasing sCr. Continued nifedipine for BP control.  Resumed diuretic now that renal insufficiency improved; but did not tolerate so stopped  Hydralazine dose changed multiple times; continue to adjust.

## 2022-07-01 NOTE — PLAN OF CARE
Problem: Adult Inpatient Plan of Care  Goal: Plan of Care Review  Outcome: Ongoing, Progressing  Goal: Patient-Specific Goal (Individualized)  Outcome: Ongoing, Progressing  Goal: Absence of Hospital-Acquired Illness or Injury  Outcome: Ongoing, Progressing  Goal: Optimal Comfort and Wellbeing  Outcome: Ongoing, Progressing  Goal: Readiness for Transition of Care  Outcome: Ongoing, Progressing     Problem: Diabetes Comorbidity  Goal: Blood Glucose Level Within Targeted Range  Outcome: Ongoing, Progressing     Problem: Skin Injury Risk Increased  Goal: Skin Health and Integrity  Outcome: Ongoing, Progressing     Problem: Impaired Wound Healing  Goal: Optimal Wound Healing  Outcome: Ongoing, Progressing     Problem: Infection  Goal: Absence of Infection Signs and Symptoms  Outcome: Ongoing, Progressing     Problem: Fall Injury Risk  Goal: Absence of Fall and Fall-Related Injury  Outcome: Ongoing, Progressing     POC reviewed. AAOX4. VVS. Up in chair tolerated well. Ambulated to BR with per self.  Remain on Abx. Awaiting authorization to LTAC. No complaint of discomfort. Safety checks performed. Call light in reach. [facility]

## 2022-07-01 NOTE — PT/OT/SLP PROGRESS
"Occupational Therapy  Co- Treatment    Name: Saji Castañeda  MRN: [id number]  Admitting Diagnosis:  Osteomyelitis of left lower extremity        Co-evaluation/treatment performed due to patient's multiple deficits requiring two skilled therapists to appropriately and safely assess patient's strength and endurance while facilitating functional tasks in addition to accommodating for patient's activity tolerance.       Recommendations:     Discharge Recommendations: nursing facility, skilled  Discharge Equipment Recommendations:  wheelchair, bedside commode, walker, rolling  Barriers to discharge:  Decreased caregiver support    Assessment:     Saji Castañeda is a 72 y.o. male with a medical diagnosis of Osteomyelitis of left lower extremity. Pt completed bed mobility with SBA, sit to stand transfers with mod A x 2 persons and RW with moderate amount of reminders for WB status, and step transfer from bed to upright chair with mod A x2.  Pt completed stand to sit with min A.     He presents with performance deficits affecting function are weakness, impaired self care skills, impaired functional mobilty, decreased safety awareness, gait instability, impaired balance, impaired skin, decreased lower extremity function, orthopedic precautions, impaired sensation.     Rehab Prognosis:  Good; patient would benefit from acute skilled OT services to address these deficits and reach maximum level of function.       Plan:     Patient to be seen 3 x/week to address the above listed problems via self-care/home management, therapeutic activities, therapeutic exercises  · Plan of Care Expires: 07/21/22  · Plan of Care Reviewed with: patient    Subjective     "It would be good to get out of this bed"   "My upper body is so weak - I don't know how I'm going to use the wheelchair again"     Pain/Comfort:  · Pain Rating 1: 0/10  · Location - Side 1: Left  · Location - Orientation 1: generalized  · Location 1: hand  · Pain Addressed 1: " Distraction  · Pain Rating Post-Intervention 1: 0/10    Objective:     Communicated with: BEREKET Pelaez prior to session.  Patient found supine with PRAFO, telemetry upon OT entry to room.    General Precautions: Standard, fall, special contact   Orthopedic Precautions:RLE partial weight bearing   Braces: N/A  Respiratory Status: Room air     Occupational Performance:     Bed Mobility:    · Patient completed Rolling/Turning to Left with  stand by assistance  · Patient completed Rolling/Turning to Right with stand by assistance  · Patient completed Scooting/Bridging with stand by assistance  · Patient completed Supine to Sit with stand by assistance     Functional Mobility/Transfers:  · Patient completed Sit <> Stand Transfer   · From EOB with moderate assistance of 2 persons  with  rolling walker   · To upright chair with min A with maximal reminders for safe body dynamics for controlled sit  · Functional Mobility: Pt completed bed to chair step transfer with mod A x2 persons and RW with maximal amount of cues for WB status, and safe dynamics with RW    Activities of Daily Living:  · Lower Body Dressing: maximal assistance donning brace while sitting EOB   · Upper Body Dressing: mod A managing hospital gown to maximize coverage, affix ties      [facility] 6 Click ADL: 17    Treatment & Education:   POC was dicussed with patient/caregiver, who was included in its development and is in agreement with the identified goals and treatment plan.    Time provided for therapeutic counseling and discussion of health disposition.    Educated on importance of EOB/OOB mobility, maintaining routine, sitting up in chair, and maximizing independence with ADLs during admission    Pt completed ADLs and functional mobility for treatment session as noted above    Pt/caregiver verbalized understanding and expressed no further concerns/questions.      Patient left up in chair with all lines intact, call button in reach and RN  notifiedEducation:      GOALS:   Multidisciplinary Problems     Occupational Therapy Goals        Problem: Occupational Therapy    Goal Priority Disciplines Outcome Interventions   Occupational Therapy Goal     OT, PT/OT Ongoing, Progressing    Description: Goals to be met by: 07-02-22     Patient will increase functional independence with ADLs by performing:    UE Dressing with Set-up Assistance.  LE Dressing with Minimal Assistance.  Grooming while seated with Set-up Assistance.  Toileting from bedside commode with Stand-by Assistance for hygiene and clothing management.   Supine to sit with Stand-by Assistance.  Stand pivot transfers with Stand-by Assistance with RW and PWB in left heel only  Toilet transfer to bedside commode with Stand-by Assistance.  Pt. To perform scoot pivot transfer with Supervision to and from the wheelchair                     Time Tracking:     OT Date of Treatment: 07/01/22  OT Start Time: 1454  OT Stop Time: 1507  OT Total Time (min): 13 min    Billable Minutes:Therapeutic Activity 13    OT/KATHLEEN: OT          7/1/2022

## 2022-07-01 NOTE — PROGRESS NOTES
[facility]  Telemedicine Progress Note    Patient Name: Saji Castañeda  MRN: [id number]  Patient Class: IP- Inpatient   Admission Date: 6/15/2022  Length of Stay: 15 days  Attending Physician: Keerthi Mayorga MD  Primary Care Provider: Mart Escalante MD          Subjective:     Principal Problem:Osteomyelitis of left lower extremity        HPI:  71 y.o. M with PMHx DM2 and PVD with chronic diabetic foot wounds s/p L sided BKA, HTN, asymptomatic bradycardia, CKD 3 and HLD who presented to the ED for worsening R foot pain and drainage. Pt. With known chronic ulceration/osteomylitis to L heel and ID has recommended BKA for definitive therapy, but the patient reports that he has not been interested in amputation. Today, he had his usual f/u appointment with ID, and the patient was referred to the ED for imaging with bone biopsy and potential abx because of worsening drainage and pain. Pt. Denies any current fevers, chills, nausea, or other systemic signs of infection.      Overview/Hospital Course:  Patient admitted to hospital medicine. Podiatry and vascular surgery consulted. Patient underwent bone biopsy and culture. Infectious disease consulted.         This encounter was provided through telemedicine.  Patient was transferred to the telemedicine service on:  06/18/2022   [facility] 6/15/2022  2:10 PM.  Present with the patient at the time of the telemed/virtual assessment: Telepresenter    Interval History/Overnight Events:   Clinical record since admit reviewed.  Patient complained of increased left foot pain which started early this a.m. required oxycodone for alleviation; he is not taking oxycodone for several days; podiatry evaluated and updated wound care orders; wound appears stable; increased pain may have been due to work he did with PT the day prior  -he was accepted by a SNF but subsequently declined when they were unable to  provide antibiotic, Daptomycin    Review of Systems   Constitutional:  Positive for activity change.   Respiratory:  Negative for cough and shortness of breath.    Cardiovascular:  Negative for chest pain.   Gastrointestinal:  Negative for diarrhea and vomiting.   Musculoskeletal:  Positive for arthralgias and myalgias.      Inpatient Medications:  Scheduled Meds:   aspirin  81 mg Oral Daily    atorvastatin  80 mg Oral Daily    DAPTOmycin (CUBICIN)  IV  8 mg/kg Intravenous Q24H    gabapentin  200 mg Oral BID    hydrALAZINE  25 mg Oral Q8H    hydroCHLOROthiazide  12.5 mg Oral Daily    insulin aspart U-100  20 Units Subcutaneous TIDWM    insulin detemir U-100  20 Units Subcutaneous BID    Lactobacillus rhamnosus GG  1 capsule Oral BID    metroNIDAZOLE  500 mg Oral Q8H    NIFEdipine  90 mg Oral Daily    sodium chloride 0.9%  10 mL Intravenous Q6H    tamsulosin  0.4 mg Oral Daily     Continuous Infusions:  PRN Meds:.acetaminophen, albuterol-ipratropium, aluminum-magnesium hydroxide-simethicone, dextrose 10%, dextrose 10%, glucagon (human recombinant), glucose, glucose, hydrALAZINE, insulin aspart U-100, melatonin, naloxone, ondansetron, oxyCODONE-acetaminophen, prochlorperazine, sars-cov-2 (covid-19), sodium chloride 0.9%, Flushing PICC Protocol **AND** sodium chloride 0.9% **AND** sodium chloride 0.9%      Objective:     Temp:  [98.1 °F (36.7 °C)-98.5 °F (36.9 °C)] 98.4 °F (36.9 °C)  Pulse:  [44-67] 66  Resp:  [16-20] 16  SpO2:  [92 %-96 %] 94 %  BP: (146-178)/(67-76) 150/67      Intake/Output Summary (Last 24 hours) at 6/30/2022 2008  Last data filed at 6/30/2022 1757  Gross per 24 hour   Intake 580 ml   Output 1400 ml   Net -820 ml        Body mass index is 40.47 kg/m².    Physical Exam  Vitals and nursing note reviewed.   Constitutional:       General: He is not in acute distress.     Appearance: Normal appearance. He is normal weight. He is not ill-appearing.   HENT:      Head: Normocephalic and  atraumatic.      Right Ear: Hearing normal.      Left Ear: Hearing normal.      Nose: Nose normal.   Eyes:      General: No scleral icterus.        Right eye: No discharge.         Left eye: No discharge.      Extraocular Movements: Extraocular movements intact.   Cardiovascular:      Rate and Rhythm: Normal rate.   Pulmonary:      Effort: Pulmonary effort is normal. No accessory muscle usage or respiratory distress.   Musculoskeletal:      Comments: Left foot with dressing; patient notes edema improved from admit   Neurological:      General: No focal deficit present.      Mental Status: He is alert and oriented to person, place, and time.      Cranial Nerves: No cranial nerve deficit.      Motor: No weakness.   Psychiatric:         Attention and Perception: Attention normal.         Mood and Affect: Mood normal.         Speech: Speech normal.         Behavior: Behavior is cooperative.        Labs:  Recent Results (from the past 24 hour(s))   CK    Collection Time: 06/29/22 11:50 PM   Result Value Ref Range     20 - 200 U/L   Comprehensive Metabolic Panel (CMP)    Collection Time: 06/30/22  5:41 AM   Result Value Ref Range    Sodium 136 136 - 145 mmol/L    Potassium 4.7 3.5 - 5.1 mmol/L    Chloride 104 95 - 110 mmol/L    CO2 25 23 - 29 mmol/L    Glucose 251 (H) 70 - 110 mg/dL    BUN 27 (H) 8 - 23 mg/dL    Creatinine 1.6 (H) 0.5 - 1.4 mg/dL    Calcium 8.9 8.7 - 10.5 mg/dL    Total Protein 6.7 6.0 - 8.4 g/dL    Albumin 2.7 (L) 3.5 - 5.2 g/dL    Total Bilirubin 0.2 0.1 - 1.0 mg/dL    Alkaline Phosphatase 69 55 - 135 U/L    AST 15 10 - 40 U/L    ALT 13 10 - 44 U/L    Anion Gap 7 (L) 8 - 16 mmol/L    eGFR if African American 48.7 (A) >60 mL/min/1.73 m^2    eGFR if non  42.1 (A) >60 mL/min/1.73 m^2   CBC with Automated Differential    Collection Time: 06/30/22  5:41 AM   Result Value Ref Range    WBC 7.83 3.90 - 12.70 K/uL    RBC 4.20 (L) 4.60 - 6.20 M/uL    Hemoglobin 10.5 (L) 14.0 - 18.0 g/dL     Hematocrit 32.8 (L) 40.0 - 54.0 %    MCV 78 (L) 82 - 98 fL    MCH 25.0 (L) 27.0 - 31.0 pg    MCHC 32.0 32.0 - 36.0 g/dL    RDW 16.7 (H) 11.5 - 14.5 %    Platelets 375 150 - 450 K/uL    MPV 9.4 9.2 - 12.9 fL    Immature Granulocytes 0.3 0.0 - 0.5 %    Gran # (ANC) 4.6 1.8 - 7.7 K/uL    Immature Grans (Abs) 0.02 0.00 - 0.04 K/uL    Lymph # 1.6 1.0 - 4.8 K/uL    Mono # 0.7 0.3 - 1.0 K/uL    Eos # 0.9 (H) 0.0 - 0.5 K/uL    Baso # 0.06 0.00 - 0.20 K/uL    nRBC 0 0 /100 WBC    Gran % 58.4 38.0 - 73.0 %    Lymph % 20.1 18.0 - 48.0 %    Mono % 9.2 4.0 - 15.0 %    Eosinophil % 11.2 (H) 0.0 - 8.0 %    Basophil % 0.8 0.0 - 1.9 %    Differential Method Automated    POCT glucose    Collection Time: 06/30/22  7:35 AM   Result Value Ref Range    POCT Glucose 254 (H) 70 - 110 mg/dL   POCT glucose    Collection Time: 06/30/22 11:54 AM   Result Value Ref Range    POCT Glucose 209 (H) 70 - 110 mg/dL   POCT glucose    Collection Time: 06/30/22  3:15 PM   Result Value Ref Range    POCT Glucose 129 (H) 70 - 110 mg/dL        Lab Results   Component Value Date    [id number] Negative 06/15/2022       Recent Labs   Lab 06/26/22  0538 06/28/22  0621 06/30/22  0541   WBC 8.49 8.71 7.83   LYMPH 16.8*  1.4 17.6*  1.5 20.1  1.6   HGB 10.6* 10.8* 10.5*   HCT 34.3* 34.0* 32.8*    364 375     Recent Labs   Lab 06/27/22  0520 06/28/22  0621 06/30/22  0541    137 136   K 4.2 4.5 4.7    104 104   CO2 24 23 25   BUN 23 22 27*   CREATININE 1.4 1.4 1.6*   * 174* 251*   CALCIUM 8.7 8.8 8.9   MG 1.7  --   --      Recent Labs   Lab 06/26/22  0538 06/28/22  0621 06/30/22  0541   ALKPHOS 69 66 69   ALT 14 14 13   AST 19 17 15   ALBUMIN 2.5* 2.6* 2.7*   PROT 7.1 6.6 6.7   BILITOT 0.2 0.3 0.2        Recent Labs     06/29/22  2350          All labs within the last 24 hours were reviewed.     Microbiology:  Microbiology Results (last 7 days)       Procedure Component Value Units Date/Time    Clostridium difficile EIA  [[id number]]     Order Status: Canceled Specimen: Stool     Stool culture [[id number]]     Order Status: No result Specimen: Stool               Imaging  ECG Results    None         No results found for this or any previous visit.      X-Ray Chest 1 View S/P PICC Line by Nursing  Narrative: EXAMINATION:  XR CHEST 1 VIEW S/P PICC LINE BY NURSING    CLINICAL HISTORY:  s/p picc placement;    TECHNIQUE:  Single frontal view of chest.    COMPARISON:  March 3, 2021    FINDINGS:  Right PICC line now evident, tip superimposing lower superior vena caval soft tissues.  Normal heart size.  Normal pulmonary vasculature.  No focal infiltrates.  No pleural fluid or pneumothorax.  No acute bony findings.  Impression: 1. Right PICC line tip superimposes lower superior vena caval soft tissues.  2. No active cardiac or pulmonary findings    Electronically signed by: John Thomas  Date:    06/24/2022  Time:    12:31      All imaging within the last 24 hours was reviewed.       Discharge Planning   OXANA: 7/1/2022     Code Status: Full Code   Is the patient medically ready for discharge?: Yes    Reason for patient still in hospital (select all that apply): Patient trending condition, Treatment, and Pending disposition  Discharge Plan A: Skilled Nursing Facility   Discharge Delays: None known at this time          Assessment/Plan:      * Osteomyelitis of left lower extremity  -With acute on chronic osteomyleitis of LLE including heel with purulent foul smelling drainage  -Podiatry consulted, Not septic appearing. S/p bone biopsy.  -ID consulted. Cultures growing Staph aureus and GBS   - L foot wound anaerobic culture growing Prevotella and Porphyromonas Somerae. PO Flagyl added.    - L foot bone culture growing Staph species, continued IV ceftriaxone and vancomycin (per Pharmacy dosing).  - with acute renal insufficiency, vancomycin changed to daptomycin and ceftriaxone discontinued as not needed based on current cultures. Continuing  Flagyl  -Continuing daptomycin and Flagyl; Estimated end date of daptomycin: 7/28/22. Estimated end date of Flagyl: 7/4/22    Acute renal insufficiency  Estimated Creatinine Clearance: 53.6 mL/min (A) (based on SCr of 1.6 mg/dL (H)).  Per med history, patient no longer taking ARB.  sCr up slightly and vanco level > 20.  Discontinued ARB, holding diuretic for now. Trial of gentle hydration ineffective.  Discontinued vancomycin.  -variable sCr     Peripheral arterial disease  -PVD contributing to current non-healing foot wound. Arterial US shows LLE atherosclerotic disease, areas of high-grade stenosis within the L popliteal artery  -Continue home statin, ASA  -Vascular Surgery consulted - patient considering surgery (BKA); seems reluctant primarily due to the time he expects it to take to obtain a prosthetic limb. Wants to try conservative management with antibiotics.  -Plan for SNF for IV antibiotics, OT/PT  - Follow up with Podiatry, ID and Vascular Surgery    Asymptomatic Mobitz (type) I (Wenckebach's) atrioventricular block  -Bradycardic in ED but asymptomatic, chronic issue  -Repeat EKG 6/19:  BPM 44 Normal sinus rhythm - AV block, 2:1 - Nonspecific T wave abnormality   -avoid BB and chronotropic CCB    Chronic kidney disease, stage III (moderate)  -sCr baseline 1.1  -Continue to monitor  -sCr remained > baseline; near to baseline 6/26  -sCr 1.4 on 6/29; HCTZ was resumed on 6/27 for hypertension but stopped on 6/30 when Cr elevated    Type 2 diabetes, uncontrolled, with neuropathy  -Last A1c 2020, repeat > 9%. Per patient, taking 68 units Lantus QHS along with metformin  mg and glipizide 10 mg daily (Possibly, he is not sure)  -Per Pharm med history: patient is no longer taking insulin  -Mildly hypoglycemic on admit with BG 63  -decreased dose of basal insulin to 15 units Levemir, Titrate up as needed. Added prandial insulin 6/19  -worsening hyperglycemia as oral agents wash out. Levemir increased to BID  and aspart increased with meals.  - doses adjusted 6/21; increased for 6/23, and again for 6/25  - some hyperglycemia due to dietary indiscretion; decreased basal insulin 6/27  - BGs controlled when patient adheres to diet (no regular sodas)    Essential hypertension  Continued losartan 100mg and HCTZ 25 mg, with hydralazine 25 mg PO q8h PRN  Added nifedipine 6/18  Consulted PharmD for med history: patient reports no longer taking isosorbide-hydralazine, losartan, and losartan-HCTZ  Discontinued ARB, held HCTZ due to increasing sCr. Continued nifedipine for BP control.  Resumed diuretic now that renal insufficiency improved; but did not tolerate so stopped  Hydralazine dose changed multiple times; continue to adjust.      VTE Risk Mitigation (From admission, onward)         Ordered     IP VTE HIGH RISK PATIENT  Once         06/15/22 1957     Place sequential compression device  Until discontinued         06/15/22 1957              I have assessed these findings virtually using a telemed platform and with assistance of the bedside nurse.        The attending portion of this evaluation, treatment, and documentation was performed per Keerthi Mayorga MD via Telemedicine AudioVisual using the secure [employer] software platform with 2 way audio/video. The provider was located off-site and the patient is located in the hospital. The aforementioned video software was utilized to document the relevant history and physical exam    Keerthi Mayorga MD  Department of Hospital Medicine   [facility] - Telemetry Stepdown

## 2022-07-01 NOTE — ASSESSMENT & PLAN NOTE
-sCr baseline 1.1  -Continue to monitor  -sCr remained > baseline; near to baseline 6/26  -sCr 1.4 on 6/29; HCTZ was resumed on 6/27 for hypertension but stopped on 6/30 when Cr elevated

## 2022-07-01 NOTE — PROGRESS NOTES
Aaron Berg - Telemetry [facility] Medicine  Telemedicine Progress Note    Patient Name: Saji Castañeda  MRN: [id number]  Patient Class: IP- Inpatient   Admission Date: 6/15/2022  Length of Stay: 16 days  Attending Physician: Keerthi Mayorga MD  Primary Care Provider: Mart Escalante MD          Subjective:     Principal Problem:Osteomyelitis of left lower extremity        HPI:  71 y.o. M with PMHx DM2 and PVD with chronic diabetic foot wounds s/p L sided BKA, HTN, asymptomatic bradycardia, CKD 3 and HLD who presented to the ED for worsening R foot pain and drainage. Pt. With known chronic ulceration/osteomylitis to L heel and ID has recommended BKA for definitive therapy, but the patient reports that he has not been interested in amputation. Today, he had his usual f/u appointment with ID, and the patient was referred to the ED for imaging with bone biopsy and potential abx because of worsening drainage and pain. Pt. Denies any current fevers, chills, nausea, or other systemic signs of infection.      Overview/Hospital Course:  Patient admitted to hospital medicine. Podiatry and vascular surgery consulted. Patient underwent bone biopsy and culture. Infectious disease consulted.         This encounter was provided through telemedicine.  Patient was transferred to the telemedicine service on:  06/18/2022   The patient location is: [facility] admitted 6/15/2022  2:10 PM.  Present with the patient at the time of the telemed/virtual assessment: Telepresenter    Interval History/Overnight Events:   -uneventful night; frustrated with denial by the nursing home; foot pain has not recurred since yesterday; otherwise, well  -he was accepted by a SNF but subsequently declined when they were unable to provide antibiotic, Daptomycin    Review of Systems   Constitutional:  Positive for activity change.   Respiratory:  Negative for cough and shortness of breath.    Cardiovascular:  Negative for chest pain.    Gastrointestinal:  Negative for diarrhea and vomiting.   Musculoskeletal:  Negative for arthralgias and myalgias.      Inpatient Medications:  Scheduled Meds:   aspirin  81 mg Oral Daily    atorvastatin  80 mg Oral Daily    DAPTOmycin (CUBICIN)  IV  8 mg/kg Intravenous Q24H    gabapentin  200 mg Oral BID    hydrALAZINE  25 mg Oral Q8H    insulin aspart U-100  20 Units Subcutaneous TIDWM    insulin detemir U-100  20 Units Subcutaneous BID    Lactobacillus rhamnosus GG  1 capsule Oral BID    metroNIDAZOLE  500 mg Oral Q8H    NIFEdipine  90 mg Oral Daily    sodium chloride 0.9%  10 mL Intravenous Q6H    tamsulosin  0.4 mg Oral Daily     Continuous Infusions:  PRN Meds:.acetaminophen, albuterol-ipratropium, aluminum-magnesium hydroxide-simethicone, dextrose 10%, dextrose 10%, glucagon (human recombinant), glucose, glucose, hydrALAZINE, insulin aspart U-100, melatonin, naloxone, ondansetron, oxyCODONE-acetaminophen, prochlorperazine, sars-cov-2 (covid-19), sodium chloride 0.9%, Flushing PICC Protocol **AND** sodium chloride 0.9% **AND** sodium chloride 0.9%      Objective:     Temp:  [98.2 °F (36.8 °C)-99 °F (37.2 °C)] 99 °F (37.2 °C)  Pulse:  [43-69] 69  Resp:  [15-20] 18  SpO2:  [92 %-97 %] 97 %  BP: (138-167)/(64-74) 138/64      Intake/Output Summary (Last 24 hours) at 7/1/2022 1657  Last data filed at 7/1/2022 1315  Gross per 24 hour   Intake 780 ml   Output 1675 ml   Net -895 ml          Body mass index is 40.47 kg/m².    Physical Exam  Vitals and nursing note reviewed.   Constitutional:       General: He is not in acute distress.     Appearance: Normal appearance. He is normal weight. He is not ill-appearing.   HENT:      Head: Normocephalic and atraumatic.      Right Ear: Hearing normal.      Left Ear: Hearing normal.      Nose: Nose normal.   Eyes:      General: No scleral icterus.        Right eye: No discharge.         Left eye: No discharge.      Extraocular Movements: Extraocular movements  intact.   Cardiovascular:      Rate and Rhythm: Normal rate.   Pulmonary:      Effort: Pulmonary effort is normal. No accessory muscle usage or respiratory distress.   Musculoskeletal:      Right lower leg: Edema present.      Left lower leg: Edema present.      Comments: Left foot with dressing; patient notes edema improved from admit   Neurological:      General: No focal deficit present.      Mental Status: He is alert and oriented to person, place, and time.      Cranial Nerves: No cranial nerve deficit.      Motor: No weakness.   Psychiatric:         Attention and Perception: Attention normal.         Mood and Affect: Mood normal.         Speech: Speech normal.         Behavior: Behavior is cooperative.        Labs:  Recent Results (from the past 24 hour(s))   POCT glucose    Collection Time: 06/30/22  8:40 PM   Result Value Ref Range    POCT Glucose 159 (H) 70 - 110 mg/dL   Renal function panel    Collection Time: 07/01/22  4:41 AM   Result Value Ref Range    Glucose 165 (H) 70 - 110 mg/dL    Sodium 136 136 - 145 mmol/L    Potassium 4.4 3.5 - 5.1 mmol/L    Chloride 104 95 - 110 mmol/L    CO2 24 23 - 29 mmol/L    BUN 27 (H) 8 - 23 mg/dL    Calcium 9.5 8.7 - 10.5 mg/dL    Creatinine 1.4 0.5 - 1.4 mg/dL    Albumin 2.7 (L) 3.5 - 5.2 g/dL    Phosphorus 4.0 2.7 - 4.5 mg/dL    eGFR if  57.2 (A) >60 mL/min/1.73 m^2    eGFR if non African American 49.5 (A) >60 mL/min/1.73 m^2    Anion Gap 8 8 - 16 mmol/L   POCT glucose    Collection Time: 07/01/22  7:44 AM   Result Value Ref Range    POCT Glucose 190 (H) 70 - 110 mg/dL   POCT glucose    Collection Time: 07/01/22 11:09 AM   Result Value Ref Range    POCT Glucose 188 (H) 70 - 110 mg/dL   POCT glucose    Collection Time: 07/01/22  3:16 PM   Result Value Ref Range    POCT Glucose 264 (H) 70 - 110 mg/dL        Lab Results   Component Value Date    [id number] Negative 06/15/2022       Recent Labs   Lab 06/26/22  0538 06/28/22  0621 06/30/22  0541   WBC 8.49  8.71 7.83   LYMPH 16.8*  1.4 17.6*  1.5 20.1  1.6   HGB 10.6* 10.8* 10.5*   HCT 34.3* 34.0* 32.8*    364 375       Recent Labs   Lab 06/27/22  0520 06/28/22  0621 06/30/22  0541 07/01/22  0441    137 136 136   K 4.2 4.5 4.7 4.4    104 104 104   CO2 24 23 25 24   BUN 23 22 27* 27*   CREATININE 1.4 1.4 1.6* 1.4   * 174* 251* 165*   CALCIUM 8.7 8.8 8.9 9.5   MG 1.7  --   --   --    PHOS  --   --   --  4.0       Recent Labs   Lab 06/26/22  0538 06/28/22 0621 06/30/22  0541 07/01/22  0441   ALKPHOS 69 66 69  --    ALT 14 14 13  --    AST 19 17 15  --    ALBUMIN 2.5* 2.6* 2.7* 2.7*   PROT 7.1 6.6 6.7  --    BILITOT 0.2 0.3 0.2  --           Recent Labs     06/29/22  2350            All labs within the last 24 hours were reviewed.     Microbiology:  Microbiology Results (last 7 days)       Procedure Component Value Units Date/Time    Clostridium difficile EIA [[id number]]     Order Status: Canceled Specimen: Stool     Stool culture [[id number]]     Order Status: No result Specimen: Stool               Imaging  ECG Results    None         No results found for this or any previous visit.      X-Ray Chest 1 View S/P PICC Line by Nursing  Narrative: EXAMINATION:  XR CHEST 1 VIEW S/P PICC LINE BY NURSING    CLINICAL HISTORY:  s/p picc placement;    TECHNIQUE:  Single frontal view of chest.    COMPARISON:  March 3, 2021    FINDINGS:  Right PICC line now evident, tip superimposing lower superior vena caval soft tissues.  Normal heart size.  Normal pulmonary vasculature.  No focal infiltrates.  No pleural fluid or pneumothorax.  No acute bony findings.  Impression: 1. Right PICC line tip superimposes lower superior vena caval soft tissues.  2. No active cardiac or pulmonary findings    Electronically signed by: John Thomas  Date:    06/24/2022  Time:    12:31      All imaging within the last 24 hours was reviewed.       Discharge Planning   OXANA: 7/5/2022     Code Status: Full Code   Is the  patient medically ready for discharge?: Yes    Reason for patient still in hospital (select all that apply): Patient trending condition, Treatment, and Pending disposition  Discharge Plan A: Skilled Nursing Facility   Discharge Delays: None known at this time          Assessment/Plan:      * Osteomyelitis of left lower extremity  -With acute on chronic osteomyleitis of LLE including heel with purulent foul smelling drainage  -Podiatry consulted, Not septic appearing. S/p bone biopsy.  -ID consulted. Cultures growing Staph aureus and GBS   - L foot wound anaerobic culture growing Prevotella and Porphyromonas Somerae. PO Flagyl added.    - L foot bone culture growing Staph species, continued IV ceftriaxone and vancomycin (per Pharmacy dosing).  - with acute renal insufficiency, vancomycin changed to daptomycin and ceftriaxone discontinued as not needed based on current cultures. Continuing Flagyl  -Continuing daptomycin and Flagyl; Estimated end date of daptomycin: 7/28/22. Estimated end date of Flagyl: 7/4/22    Acute renal insufficiency  Estimated Creatinine Clearance: 61.2 mL/min (based on SCr of 1.4 mg/dL).  Per med history, patient no longer taking ARB.  sCr up slightly and vanco level > 20.  Discontinued ARB, holding diuretic for now. Trial of gentle hydration ineffective.  Discontinued vancomycin.  -variable sCr     Peripheral arterial disease  -PVD contributing to current non-healing foot wound. Arterial US shows LLE atherosclerotic disease, areas of high-grade stenosis within the L popliteal artery  -Continue home statin, ASA  -Vascular Surgery consulted - patient considering surgery (BKA); seems reluctant primarily due to the time he expects it to take to obtain a prosthetic limb. Wants to try conservative management with antibiotics.  -Plan for SNF for IV antibiotics, OT/PT  - Follow up with Podiatry, ID and Vascular Surgery    Asymptomatic Mobitz (type) I (Wenckebach's) atrioventricular block  -Bradycardic  in ED but asymptomatic, chronic issue  -Repeat EKG 6/19:  BPM 44 Normal sinus rhythm - AV block, 2:1 - Nonspecific T wave abnormality   -avoid BB and chronotropic CCB    Chronic kidney disease, stage III (moderate)  -sCr baseline 1.1  -Continue to monitor  -sCr remained > baseline; near to baseline 6/26  -sCr 1.4 on 6/29; HCTZ was resumed on 6/27 for hypertension but stopped on 6/30 when Cr elevated    Type 2 diabetes, uncontrolled, with neuropathy  -Last A1c 2020, repeat > 9%. Per patient, taking 68 units Lantus QHS along with metformin  mg and glipizide 10 mg daily (Possibly, he is not sure)  -Per Pharm med history: patient is no longer taking insulin  -Mildly hypoglycemic on admit with BG 63  -decreased dose of basal insulin to 15 units Levemir, Titrate up as needed. Added prandial insulin 6/19  -worsening hyperglycemia as oral agents wash out. Levemir increased to BID and aspart increased with meals.  - doses adjusted 6/21; increased for 6/23, and again for 6/25  - some hyperglycemia due to dietary indiscretion; decreased basal insulin 6/27  - BGs controlled when patient adheres to diet (no regular sodas)    Essential hypertension  Continued losartan 100mg and HCTZ 25 mg, with hydralazine 25 mg PO q8h PRN  Added nifedipine 6/18  Consulted PharmD for med history: patient reports no longer taking isosorbide-hydralazine, losartan, and losartan-HCTZ  Discontinued ARB, held HCTZ due to increasing sCr. Continued nifedipine for BP control.  Resumed diuretic now that renal insufficiency improved; but did not tolerate so stopped  Hydralazine dose changed multiple times; continue to adjust.      VTE Risk Mitigation (From admission, onward)         Ordered     IP VTE HIGH RISK PATIENT  Once         06/15/22 1957     Place sequential compression device  Until discontinued         06/15/22 1957                I have assessed these findings virtually using a telemed platform and with assistance of the bedside nurse.         The attending portion of this evaluation, treatment, and documentation was performed per Keerthi Mayorga MD via Telemedicine AudioVisual using the secure [employer] software platform with 2 way audio/video. The provider was located off-site and the patient is located in the hospital. The aforementioned video software was utilized to document the relevant history and physical exam    Keerthi Mayorga MD  Department of Hospital Medicine   [facility] - Telemetry Stepdown

## 2022-07-01 NOTE — PLAN OF CARE
Problem: Adult Inpatient Plan of Care  Goal: Plan of Care Review  Outcome: Ongoing, Progressing     Problem: Adult Inpatient Plan of Care  Goal: Optimal Comfort and Wellbeing  Outcome: Ongoing, Progressing     Problem: Adult Inpatient Plan of Care  Goal: Patient-Specific Goal (Individualized)  Outcome: Ongoing, Progressing     POC reviewed with pt. Answered all questions. A&Ox4. Denies pain. Intermittent asymptomatic bradycardia throughout night. Pt noted to be withdrawn. Pt states he is fine. Bed in lowest position, call light within reach, non skid socks on, bed alarm set, instructed to call staff for needs with call bell - pt verbalized understanding.

## 2022-07-01 NOTE — ASSESSMENT & PLAN NOTE
Estimated Creatinine Clearance: 53.6 mL/min (A) (based on SCr of 1.6 mg/dL (H)).  Per med history, patient no longer taking ARB.  sCr up slightly and vanco level > 20.  Discontinued ARB, holding diuretic for now. Trial of gentle hydration ineffective.  Discontinued vancomycin.  -variable sCr

## 2022-07-01 NOTE — ASSESSMENT & PLAN NOTE
Estimated Creatinine Clearance: 61.2 mL/min (based on SCr of 1.4 mg/dL).  Per med history, patient no longer taking ARB.  sCr up slightly and vanco level > 20.  Discontinued ARB, holding diuretic for now. Trial of gentle hydration ineffective.  Discontinued vancomycin.  -variable sCr

## 2022-07-01 NOTE — PLAN OF CARE
07/01/22 0930   Post-Acute Status   Post-Acute Authorization Placement   Post-Acute Placement Status Pending payor review/awaiting authorization (if required)   [facility] and other SNFs have denied patient due to the cost of daptomycin.    A referral was sent to [facility] who has accepted and will submit for auth.    Toshia Dennis LMSW  [facility]  Ext. [phone]

## 2022-07-01 NOTE — SUBJECTIVE & OBJECTIVE
This encounter was provided through telemedicine.  Patient was transferred to the telemedicine service on:  06/18/2022   The patient location is: [id number] A admitted 6/15/2022  2:10 PM.  Present with the patient at the time of the telemed/virtual assessment: Telepresenter    Interval History/Overnight Events:   -uneventful night; frustrated with denial by the nursing home; foot pain has not recurred since yesterday; otherwise, well  -he was accepted by a SNF but subsequently declined when they were unable to provide antibiotic, Daptomycin    Review of Systems   Constitutional:  Positive for activity change.   Respiratory:  Negative for cough and shortness of breath.    Cardiovascular:  Negative for chest pain.   Gastrointestinal:  Negative for diarrhea and vomiting.   Musculoskeletal:  Negative for arthralgias and myalgias.      Inpatient Medications:  Scheduled Meds:   aspirin  81 mg Oral Daily    atorvastatin  80 mg Oral Daily    DAPTOmycin (CUBICIN)  IV  8 mg/kg Intravenous Q24H    gabapentin  200 mg Oral BID    hydrALAZINE  25 mg Oral Q8H    insulin aspart U-100  20 Units Subcutaneous TIDWM    insulin detemir U-100  20 Units Subcutaneous BID    Lactobacillus rhamnosus GG  1 capsule Oral BID    metroNIDAZOLE  500 mg Oral Q8H    NIFEdipine  90 mg Oral Daily    sodium chloride 0.9%  10 mL Intravenous Q6H    tamsulosin  0.4 mg Oral Daily     Continuous Infusions:  PRN Meds:.acetaminophen, albuterol-ipratropium, aluminum-magnesium hydroxide-simethicone, dextrose 10%, dextrose 10%, glucagon (human recombinant), glucose, glucose, hydrALAZINE, insulin aspart U-100, melatonin, naloxone, ondansetron, oxyCODONE-acetaminophen, prochlorperazine, sars-cov-2 (covid-19), sodium chloride 0.9%, Flushing PICC Protocol **AND** sodium chloride 0.9% **AND** sodium chloride 0.9%      Objective:     Temp:  [98.2 °F (36.8 °C)-99 °F (37.2 °C)] 99 °F (37.2 °C)  Pulse:  [43-69] 69  Resp:  [15-20] 18  SpO2:  [92 %-97 %] 97 %  BP:  (138-167)/(64-74) 138/64      Intake/Output Summary (Last 24 hours) at 7/1/2022 1657  Last data filed at 7/1/2022 1315  Gross per 24 hour   Intake 780 ml   Output 1675 ml   Net -895 ml          Body mass index is 40.47 kg/m².    Physical Exam  Vitals and nursing note reviewed.   Constitutional:       General: He is not in acute distress.     Appearance: Normal appearance. He is normal weight. He is not ill-appearing.   HENT:      Head: Normocephalic and atraumatic.      Right Ear: Hearing normal.      Left Ear: Hearing normal.      Nose: Nose normal.   Eyes:      General: No scleral icterus.        Right eye: No discharge.         Left eye: No discharge.      Extraocular Movements: Extraocular movements intact.   Cardiovascular:      Rate and Rhythm: Normal rate.   Pulmonary:      Effort: Pulmonary effort is normal. No accessory muscle usage or respiratory distress.   Musculoskeletal:      Right lower leg: Edema present.      Left lower leg: Edema present.      Comments: Left foot with dressing; patient notes edema improved from admit   Neurological:      General: No focal deficit present.      Mental Status: He is alert and oriented to person, place, and time.      Cranial Nerves: No cranial nerve deficit.      Motor: No weakness.   Psychiatric:         Attention and Perception: Attention normal.         Mood and Affect: Mood normal.         Speech: Speech normal.         Behavior: Behavior is cooperative.        Labs:  Recent Results (from the past 24 hour(s))   POCT glucose    Collection Time: 06/30/22  8:40 PM   Result Value Ref Range    POCT Glucose 159 (H) 70 - 110 mg/dL   Renal function panel    Collection Time: 07/01/22  4:41 AM   Result Value Ref Range    Glucose 165 (H) 70 - 110 mg/dL    Sodium 136 136 - 145 mmol/L    Potassium 4.4 3.5 - 5.1 mmol/L    Chloride 104 95 - 110 mmol/L    CO2 24 23 - 29 mmol/L    BUN 27 (H) 8 - 23 mg/dL    Calcium 9.5 8.7 - 10.5 mg/dL    Creatinine 1.4 0.5 - 1.4 mg/dL    Albumin  2.7 (L) 3.5 - 5.2 g/dL    Phosphorus 4.0 2.7 - 4.5 mg/dL    eGFR if  57.2 (A) >60 mL/min/1.73 m^2    eGFR if non African American 49.5 (A) >60 mL/min/1.73 m^2    Anion Gap 8 8 - 16 mmol/L   POCT glucose    Collection Time: 07/01/22  7:44 AM   Result Value Ref Range    POCT Glucose 190 (H) 70 - 110 mg/dL   POCT glucose    Collection Time: 07/01/22 11:09 AM   Result Value Ref Range    POCT Glucose 188 (H) 70 - 110 mg/dL   POCT glucose    Collection Time: 07/01/22  3:16 PM   Result Value Ref Range    POCT Glucose 264 (H) 70 - 110 mg/dL        Lab Results   Component Value Date    [id number] Negative 06/15/2022       Recent Labs   Lab 06/26/22 0538 06/28/22 0621 06/30/22  0541   WBC 8.49 8.71 7.83   LYMPH 16.8*  1.4 17.6*  1.5 20.1  1.6   HGB 10.6* 10.8* 10.5*   HCT 34.3* 34.0* 32.8*    364 375       Recent Labs   Lab 06/27/22 0520 06/28/22 0621 06/30/22  0541 07/01/22  0441    137 136 136   K 4.2 4.5 4.7 4.4    104 104 104   CO2 24 23 25 24   BUN 23 22 27* 27*   CREATININE 1.4 1.4 1.6* 1.4   * 174* 251* 165*   CALCIUM 8.7 8.8 8.9 9.5   MG 1.7  --   --   --    PHOS  --   --   --  4.0       Recent Labs   Lab 06/26/22  0538 06/28/22  0621 06/30/22  0541 07/01/22  0441   ALKPHOS 69 66 69  --    ALT 14 14 13  --    AST 19 17 15  --    ALBUMIN 2.5* 2.6* 2.7* 2.7*   PROT 7.1 6.6 6.7  --    BILITOT 0.2 0.3 0.2  --           Recent Labs     06/29/22  2350            All labs within the last 24 hours were reviewed.     Microbiology:  Microbiology Results (last 7 days)       Procedure Component Value Units Date/Time    Clostridium difficile EIA [[id number]]     Order Status: Canceled Specimen: Stool     Stool culture [[id number]]     Order Status: No result Specimen: Stool               Imaging  ECG Results    None         No results found for this or any previous visit.      X-Ray Chest 1 View S/P PICC Line by Nursing  Narrative: EXAMINATION:  XR CHEST 1 VIEW S/P PICC LINE  BY NURSING    CLINICAL HISTORY:  s/p picc placement;    TECHNIQUE:  Single frontal view of chest.    COMPARISON:  March 3, 2021    FINDINGS:  Right PICC line now evident, tip superimposing lower superior vena caval soft tissues.  Normal heart size.  Normal pulmonary vasculature.  No focal infiltrates.  No pleural fluid or pneumothorax.  No acute bony findings.  Impression: 1. Right PICC line tip superimposes lower superior vena caval soft tissues.  2. No active cardiac or pulmonary findings    Electronically signed by: John Thomas  Date:    06/24/2022  Time:    12:31      All imaging within the last 24 hours was reviewed.       Discharge Planning   OXANA: 7/5/2022     Code Status: Full Code   Is the patient medically ready for discharge?: Yes    Reason for patient still in hospital (select all that apply): Patient trending condition, Treatment, and Pending disposition  Discharge Plan A: Skilled Nursing Facility   Discharge Delays: None known at this time

## 2022-07-01 NOTE — PLAN OF CARE
CM met with patient to update him on his discharge plan. As [facility] is unable to accept patient, Mr. Castañeda is agreeable to going to an LTAC facility to receive his IV Daptomycin which he will need until 7/28. Referral sent to [facility] in [place]. Will continue to follow.    Barb Brown RN  Ext [phone]

## 2022-07-01 NOTE — SUBJECTIVE & OBJECTIVE
This encounter was provided through telemedicine.  Patient was transferred to the telemedicine service on:  06/18/2022   The patient location is: 8092/8092 A admitted 6/15/2022  2:10 PM.  Present with the patient at the time of the telemed/virtual assessment: Telepresenter    Interval History/Overnight Events:   Clinical record since admit reviewed.  Patient complained of increased left foot pain which started early this a.m. required oxycodone for alleviation; he is not taking oxycodone for several days; podiatry evaluated and updated wound care orders; wound appears stable; increased pain may have been due to work he did with PT the day prior  -he was accepted by a SNF but subsequently declined when they were unable to provide antibiotic, Daptomycin    Review of Systems   Constitutional:  Positive for activity change.   Respiratory:  Negative for cough and shortness of breath.    Cardiovascular:  Negative for chest pain.   Gastrointestinal:  Negative for diarrhea and vomiting.   Musculoskeletal:  Positive for arthralgias and myalgias.      Inpatient Medications:  Scheduled Meds:   aspirin  81 mg Oral Daily    atorvastatin  80 mg Oral Daily    DAPTOmycin (CUBICIN)  IV  8 mg/kg Intravenous Q24H    gabapentin  200 mg Oral BID    hydrALAZINE  25 mg Oral Q8H    hydroCHLOROthiazide  12.5 mg Oral Daily    insulin aspart U-100  20 Units Subcutaneous TIDWM    insulin detemir U-100  20 Units Subcutaneous BID    Lactobacillus rhamnosus GG  1 capsule Oral BID    metroNIDAZOLE  500 mg Oral Q8H    NIFEdipine  90 mg Oral Daily    sodium chloride 0.9%  10 mL Intravenous Q6H    tamsulosin  0.4 mg Oral Daily     Continuous Infusions:  PRN Meds:.acetaminophen, albuterol-ipratropium, aluminum-magnesium hydroxide-simethicone, dextrose 10%, dextrose 10%, glucagon (human recombinant), glucose, glucose, hydrALAZINE, insulin aspart U-100, melatonin, naloxone, ondansetron, oxyCODONE-acetaminophen, prochlorperazine, sars-cov-2 (covid-19),  sodium chloride 0.9%, Flushing PICC Protocol **AND** sodium chloride 0.9% **AND** sodium chloride 0.9%      Objective:     Temp:  [98.1 °F (36.7 °C)-98.5 °F (36.9 °C)] 98.4 °F (36.9 °C)  Pulse:  [44-67] 66  Resp:  [16-20] 16  SpO2:  [92 %-96 %] 94 %  BP: (146-178)/(67-76) 150/67      Intake/Output Summary (Last 24 hours) at 6/30/2022 2008  Last data filed at 6/30/2022 1757  Gross per 24 hour   Intake 580 ml   Output 1400 ml   Net -820 ml        Body mass index is 40.47 kg/m².    Physical Exam  Vitals and nursing note reviewed.   Constitutional:       General: He is not in acute distress.     Appearance: Normal appearance. He is normal weight. He is not ill-appearing.   HENT:      Head: Normocephalic and atraumatic.      Right Ear: Hearing normal.      Left Ear: Hearing normal.      Nose: Nose normal.   Eyes:      General: No scleral icterus.        Right eye: No discharge.         Left eye: No discharge.      Extraocular Movements: Extraocular movements intact.   Cardiovascular:      Rate and Rhythm: Normal rate.   Pulmonary:      Effort: Pulmonary effort is normal. No accessory muscle usage or respiratory distress.   Musculoskeletal:      Comments: Left foot with dressing; patient notes edema improved from admit   Neurological:      General: No focal deficit present.      Mental Status: He is alert and oriented to person, place, and time.      Cranial Nerves: No cranial nerve deficit.      Motor: No weakness.   Psychiatric:         Attention and Perception: Attention normal.         Mood and Affect: Mood normal.         Speech: Speech normal.         Behavior: Behavior is cooperative.        Labs:  Recent Results (from the past 24 hour(s))   CK    Collection Time: 06/29/22 11:50 PM   Result Value Ref Range     20 - 200 U/L   Comprehensive Metabolic Panel (CMP)    Collection Time: 06/30/22  5:41 AM   Result Value Ref Range    Sodium 136 136 - 145 mmol/L    Potassium 4.7 3.5 - 5.1 mmol/L    Chloride 104 95 -  110 mmol/L    CO2 25 23 - 29 mmol/L    Glucose 251 (H) 70 - 110 mg/dL    BUN 27 (H) 8 - 23 mg/dL    Creatinine 1.6 (H) 0.5 - 1.4 mg/dL    Calcium 8.9 8.7 - 10.5 mg/dL    Total Protein 6.7 6.0 - 8.4 g/dL    Albumin 2.7 (L) 3.5 - 5.2 g/dL    Total Bilirubin 0.2 0.1 - 1.0 mg/dL    Alkaline Phosphatase 69 55 - 135 U/L    AST 15 10 - 40 U/L    ALT 13 10 - 44 U/L    Anion Gap 7 (L) 8 - 16 mmol/L    eGFR if African American 48.7 (A) >60 mL/min/1.73 m^2    eGFR if non  42.1 (A) >60 mL/min/1.73 m^2   CBC with Automated Differential    Collection Time: 06/30/22  5:41 AM   Result Value Ref Range    WBC 7.83 3.90 - 12.70 K/uL    RBC 4.20 (L) 4.60 - 6.20 M/uL    Hemoglobin 10.5 (L) 14.0 - 18.0 g/dL    Hematocrit 32.8 (L) 40.0 - 54.0 %    MCV 78 (L) 82 - 98 fL    MCH 25.0 (L) 27.0 - 31.0 pg    MCHC 32.0 32.0 - 36.0 g/dL    RDW 16.7 (H) 11.5 - 14.5 %    Platelets 375 150 - 450 K/uL    MPV 9.4 9.2 - 12.9 fL    Immature Granulocytes 0.3 0.0 - 0.5 %    Gran # (ANC) 4.6 1.8 - 7.7 K/uL    Immature Grans (Abs) 0.02 0.00 - 0.04 K/uL    Lymph # 1.6 1.0 - 4.8 K/uL    Mono # 0.7 0.3 - 1.0 K/uL    Eos # 0.9 (H) 0.0 - 0.5 K/uL    Baso # 0.06 0.00 - 0.20 K/uL    nRBC 0 0 /100 WBC    Gran % 58.4 38.0 - 73.0 %    Lymph % 20.1 18.0 - 48.0 %    Mono % 9.2 4.0 - 15.0 %    Eosinophil % 11.2 (H) 0.0 - 8.0 %    Basophil % 0.8 0.0 - 1.9 %    Differential Method Automated    POCT glucose    Collection Time: 06/30/22  7:35 AM   Result Value Ref Range    POCT Glucose 254 (H) 70 - 110 mg/dL   POCT glucose    Collection Time: 06/30/22 11:54 AM   Result Value Ref Range    POCT Glucose 209 (H) 70 - 110 mg/dL   POCT glucose    Collection Time: 06/30/22  3:15 PM   Result Value Ref Range    POCT Glucose 129 (H) 70 - 110 mg/dL        Lab Results   Component Value Date    [id number] Negative 06/15/2022       Recent Labs   Lab 06/26/22  0538 06/28/22  0621 06/30/22  0541   WBC 8.49 8.71 7.83   LYMPH 16.8*  1.4 17.6*  1.5 20.1  1.6   HGB 10.6*  10.8* 10.5*   HCT 34.3* 34.0* 32.8*    364 375     Recent Labs   Lab 06/27/22  0520 06/28/22  0621 06/30/22  0541    137 136   K 4.2 4.5 4.7    104 104   CO2 24 23 25   BUN 23 22 27*   CREATININE 1.4 1.4 1.6*   * 174* 251*   CALCIUM 8.7 8.8 8.9   MG 1.7  --   --      Recent Labs   Lab 06/26/22  0538 06/28/22  0621 06/30/22  0541   ALKPHOS 69 66 69   ALT 14 14 13   AST 19 17 15   ALBUMIN 2.5* 2.6* 2.7*   PROT 7.1 6.6 6.7   BILITOT 0.2 0.3 0.2        Recent Labs     06/29/22  2350          All labs within the last 24 hours were reviewed.     Microbiology:  Microbiology Results (last 7 days)       Procedure Component Value Units Date/Time    Clostridium difficile EIA [[id number]]     Order Status: Canceled Specimen: Stool     Stool culture [[id number]]     Order Status: No result Specimen: Stool               Imaging  ECG Results    None         No results found for this or any previous visit.      X-Ray Chest 1 View S/P PICC Line by Nursing  Narrative: EXAMINATION:  XR CHEST 1 VIEW S/P PICC LINE BY NURSING    CLINICAL HISTORY:  s/p picc placement;    TECHNIQUE:  Single frontal view of chest.    COMPARISON:  March 3, 2021    FINDINGS:  Right PICC line now evident, tip superimposing lower superior vena caval soft tissues.  Normal heart size.  Normal pulmonary vasculature.  No focal infiltrates.  No pleural fluid or pneumothorax.  No acute bony findings.  Impression: 1. Right PICC line tip superimposes lower superior vena caval soft tissues.  2. No active cardiac or pulmonary findings    Electronically signed by: John Thomas  Date:    06/24/2022  Time:    12:31      All imaging within the last 24 hours was reviewed.       Discharge Planning   OXANA: 7/1/2022     Code Status: Full Code   Is the patient medically ready for discharge?: Yes    Reason for patient still in hospital (select all that apply): Patient trending condition, Treatment, and Pending disposition  Discharge Plan A: Skilled  Nursing Facility   Discharge Delays: None known at this time

## 2022-07-01 NOTE — PT/OT/SLP PROGRESS
Physical Therapy Co Treatment    Patient Name:  Saji Castañeda   MRN:  [id number]  *Co treatment of 2 skilled therapists indicated in order to maximize function and safety of Pt.  Recommendations:     Discharge Recommendations:  nursing facility, skilled   Discharge Equipment Recommendations: wheelchair, bedside commode, walker, rolling   Barriers to discharge: Inaccessible home and Decreased caregiver support    Assessment:     Saji Castañeda is a 72 y.o. male admitted with a medical diagnosis of Osteomyelitis of left lower extremity.  He presents with the following impairments/functional limitations:  weakness, impaired self care skills, impaired functional mobilty, decreased safety awareness, gait instability, impaired balance, impaired skin, decreased lower extremity function, orthopedic precautions, impaired endurance, impaired sensation. Saji Castañeda would benefit from acute PT intervention to address listed functional deficits, provide patient/caregiver education, reduce fall risk, and maximize (I) and safety with functional mobility.    Pt presents with significant functional mobility deficits and is functioning below baseline. Pt participated well throughout session. Performed bed mobility with SBA, sit to stand and bed to chair transfers with min-modA x 2.  Pt will continue to benefit from acute PT services in order to maximize safety and (I) with functional mobility.    After hospital discharge, pt would benefit from SNF to continue addressing therapy impairments.    Rehab Prognosis: Good; patient would benefit from acute skilled PT services to address these deficits and reach maximum level of function.      Plan:     During this hospitalization, patient to be seen 3 x/week to address the identified rehab impairments via gait training, therapeutic activities, therapeutic exercises, neuromuscular re-education and progress toward the following goals:    · Plan of Care Expires:  07/20/22    This plan of care  has been discussed with the patient/caregiver, who was included in its development and is in agreement with the identified goals and treatment plan.     Subjective     Communicated with RN prior to session.  Patient agreeable to participate.     Chief Complaint: none noted  Patient/Family Comments/goals: to get better  Pt pain prior to session: 0/10  Pt pain following session: 0/10    Objective:     Patient found HOB elevated with PRAFO, telemetry  upon PT entry to room.    General Precautions: Standard, fall, special contact   Orthopedic Precautions:RLE partial weight bearing   Braces:       Functional Mobility:    Bed Mobility:  · Supine to Sit: Stand-by Assistance  · Scooting anteriorly to EOB to plant feet on floor: Stand-by Assistance    Transfers:   · Sit to Stand Transfer: Minimal Assistance and Moderate Assistance x 2 from EOB with RW AD   · Bed to Chair: Minimal Assistance and Moderate Assistance x 2 with RW AD     Balance:  · Static Sit: Supervision at EOB x 5 minutes  · Static Stand: Min Assist with Rolling walker   · PT providing cues for hand/foot placement and AD management      Therapeutic Activities/Exercises       Patient educated on the importance of early mobility to prevent functional decline during hospital stay    Patient was instructed to utilize staff assistance for mobility/transfers.  Patient was educated on PT POC and all questions answered within PT scope of practice.    Patient/caregiver able to verbalize understanding; will follow-up with pt/caregiver during current admit for additional questions/concerns within scope of practice.     White board updated.     AM-PAC 6 CLICK MOBILITY  Total Score:13     Patient left up in chair with all lines intact and call button in reach.        History/Goals:     PAST MEDICAL HISTORY:  Past Medical History:   Diagnosis Date    Arthritis     legs    Diabetes mellitus     Diabetes mellitus, type 2     Hyperlipidemia     Osteomyelitis        Past  Surgical History:   Procedure Laterality Date    ANGIOGRAPHY OF LOWER EXTREMITY N/A 2/3/2021    Procedure: Angiogram Extremity Bilateral;  Surgeon: Ernst Chacko MD;  Location: [facility] OR [address] [place] [address];  Service: Peripheral Vascular;  Laterality: N/A;  7.4 mintues fluoroscopy time  816.15 mGy  170.17 Gycm2    AORTOGRAPHY WITH EXTREMITY RUNOFF Bilateral 2/3/2021    Procedure: AORTOGRAM, WITH EXTREMITY RUNOFF;  Surgeon: Ernst Chacko MD;  [facility];  Service: Peripheral Vascular;  Laterality: Bilateral;    DEBRIDEMENT OF FOOT Left 2021    Procedure: DEBRIDEMENT, LEFT HEEL;  Surgeon: Mayra Schroeder DPM;  Location: [facility] OR [address] [place] [address];  Service: Podiatry;  Laterality: Left;    FOOT AMPUTATION  2010    left high midfoot amputation       GOALS:   Multidisciplinary Problems     Physical Therapy Goals        Problem: Physical Therapy    Goal Priority Disciplines Outcome Goal Variances Interventions   Physical Therapy Goal     PT, PT/OT Ongoing, Progressing     Description: Goals to be met by: 22     Patient will increase functional independence with mobility by performin. Supine to sit with Modified [place]  2. Sit to stand transfer with Supervision  3. Bed to chair transfer with Stand-by Assistance using LRAD  4. Gait  x 10 feet with Stand-by Assistance using LRAD                     Time Tracking:     PT Received On: 22  PT Start Time: 1454     PT Stop Time: 1507  PT Total Time (min): 13 min     Billable Minutes: Neuromuscular Re-education 13      Sonny Madrid, PT  2022

## 2022-07-01 NOTE — PLAN OF CARE
Pt tolerated therapy session well, with good motivation and participation     Problem: Occupational Therapy  Goal: Occupational Therapy Goal  Description: Goals to be met by: 07-02-22     Patient will increase functional independence with ADLs by performing:    UE Dressing with Set-up Assistance.  LE Dressing with Minimal Assistance.  Grooming while seated with Set-up Assistance.  Toileting from bedside commode with Stand-by Assistance for hygiene and clothing management.   Supine to sit with Stand-by Assistance.  Stand pivot transfers with Stand-by Assistance with RW and PWB in left heel only  Toilet transfer to bedside commode with Stand-by Assistance.  Pt. To perform scoot pivot transfer with Supervision to and from the wheelchair    Outcome: Ongoing, Progressing

## 2022-07-02 LAB
ALBUMIN SERPL BCP-MCNC: 2.6 G/DL (ref 3.5–5.2)
ALP SERPL-CCNC: 65 U/L (ref 55–135)
ALT SERPL W/O P-5'-P-CCNC: 13 U/L (ref 10–44)
ANION GAP SERPL CALC-SCNC: 10 MMOL/L (ref 8–16)
ANION GAP SERPL CALC-SCNC: 6 MMOL/L (ref 8–16)
AST SERPL-CCNC: 18 U/L (ref 10–40)
BASOPHILS # BLD AUTO: 0.06 K/UL (ref 0–0.2)
BASOPHILS NFR BLD: 0.7 % (ref 0–1.9)
BILIRUB SERPL-MCNC: 0.3 MG/DL (ref 0.1–1)
BUN SERPL-MCNC: 27 MG/DL (ref 8–23)
BUN SERPL-MCNC: 28 MG/DL (ref 8–23)
CALCIUM SERPL-MCNC: 8.5 MG/DL (ref 8.7–10.5)
CALCIUM SERPL-MCNC: 9.1 MG/DL (ref 8.7–10.5)
CHLORIDE SERPL-SCNC: 105 MMOL/L (ref 95–110)
CHLORIDE SERPL-SCNC: 107 MMOL/L (ref 95–110)
CO2 SERPL-SCNC: 20 MMOL/L (ref 23–29)
CO2 SERPL-SCNC: 25 MMOL/L (ref 23–29)
CREAT SERPL-MCNC: 1.4 MG/DL (ref 0.5–1.4)
CREAT SERPL-MCNC: 1.4 MG/DL (ref 0.5–1.4)
DIFFERENTIAL METHOD: ABNORMAL
EOSINOPHIL # BLD AUTO: 0.7 K/UL (ref 0–0.5)
EOSINOPHIL NFR BLD: 9 % (ref 0–8)
ERYTHROCYTE [DISTWIDTH] IN BLOOD BY AUTOMATED COUNT: 17.1 % (ref 11.5–14.5)
EST. GFR  (AFRICAN AMERICAN): 57.2 ML/MIN/1.73 M^2
EST. GFR  (AFRICAN AMERICAN): 57.2 ML/MIN/1.73 M^2
EST. GFR  (NON AFRICAN AMERICAN): 49.5 ML/MIN/1.73 M^2
EST. GFR  (NON AFRICAN AMERICAN): 49.5 ML/MIN/1.73 M^2
GLUCOSE SERPL-MCNC: 160 MG/DL (ref 70–110)
GLUCOSE SERPL-MCNC: 211 MG/DL (ref 70–110)
HCT VFR BLD AUTO: 32.3 % (ref 40–54)
HGB BLD-MCNC: 10 G/DL (ref 14–18)
IMM GRANULOCYTES # BLD AUTO: 0.04 K/UL (ref 0–0.04)
IMM GRANULOCYTES NFR BLD AUTO: 0.5 % (ref 0–0.5)
LYMPHOCYTES # BLD AUTO: 1.8 K/UL (ref 1–4.8)
LYMPHOCYTES NFR BLD: 21.7 % (ref 18–48)
MAGNESIUM SERPL-MCNC: 1.9 MG/DL (ref 1.6–2.6)
MCH RBC QN AUTO: 25.8 PG (ref 27–31)
MCHC RBC AUTO-ENTMCNC: 31 G/DL (ref 32–36)
MCV RBC AUTO: 84 FL (ref 82–98)
MONOCYTES # BLD AUTO: 0.9 K/UL (ref 0.3–1)
MONOCYTES NFR BLD: 10.5 % (ref 4–15)
NEUTROPHILS # BLD AUTO: 4.7 K/UL (ref 1.8–7.7)
NEUTROPHILS NFR BLD: 57.6 % (ref 38–73)
NRBC BLD-RTO: 0 /100 WBC
PHOSPHATE SERPL-MCNC: 3.2 MG/DL (ref 2.7–4.5)
PLATELET # BLD AUTO: 372 K/UL (ref 150–450)
PMV BLD AUTO: 10.2 FL (ref 9.2–12.9)
POCT GLUCOSE: 127 MG/DL (ref 70–110)
POCT GLUCOSE: 168 MG/DL (ref 70–110)
POCT GLUCOSE: 198 MG/DL (ref 70–110)
POCT GLUCOSE: 227 MG/DL (ref 70–110)
POTASSIUM SERPL-SCNC: 4 MMOL/L (ref 3.5–5.1)
POTASSIUM SERPL-SCNC: 5.4 MMOL/L (ref 3.5–5.1)
PROT SERPL-MCNC: 6.6 G/DL (ref 6–8.4)
RBC # BLD AUTO: 3.87 M/UL (ref 4.6–6.2)
SODIUM SERPL-SCNC: 136 MMOL/L (ref 136–145)
SODIUM SERPL-SCNC: 137 MMOL/L (ref 136–145)
WBC # BLD AUTO: 8.1 K/UL (ref 3.9–12.7)

## 2022-07-02 PROCEDURE — 25000003 PHARM REV CODE 250: Performed by: INTERNAL MEDICINE

## 2022-07-02 PROCEDURE — 85025 COMPLETE CBC W/AUTO DIFF WBC: CPT | Performed by: STUDENT IN AN ORGANIZED HEALTH CARE EDUCATION/TRAINING PROGRAM

## 2022-07-02 PROCEDURE — 99232 PR SUBSEQUENT HOSPITAL CARE,LEVL II: ICD-10-PCS | Mod: 95,,, | Performed by: INTERNAL MEDICINE

## 2022-07-02 PROCEDURE — 93010 ELECTROCARDIOGRAM REPORT: CPT | Mod: ,,, | Performed by: INTERNAL MEDICINE

## 2022-07-02 PROCEDURE — 84100 ASSAY OF PHOSPHORUS: CPT | Performed by: INTERNAL MEDICINE

## 2022-07-02 PROCEDURE — 36415 COLL VENOUS BLD VENIPUNCTURE: CPT | Performed by: STUDENT IN AN ORGANIZED HEALTH CARE EDUCATION/TRAINING PROGRAM

## 2022-07-02 PROCEDURE — 99232 SBSQ HOSP IP/OBS MODERATE 35: CPT | Mod: 95,,, | Performed by: INTERNAL MEDICINE

## 2022-07-02 PROCEDURE — 83735 ASSAY OF MAGNESIUM: CPT | Performed by: INTERNAL MEDICINE

## 2022-07-02 PROCEDURE — 93010 EKG 12-LEAD: ICD-10-PCS | Mod: ,,, | Performed by: INTERNAL MEDICINE

## 2022-07-02 PROCEDURE — 93005 ELECTROCARDIOGRAM TRACING: CPT

## 2022-07-02 PROCEDURE — 25000003 PHARM REV CODE 250: Performed by: HOSPITALIST

## 2022-07-02 PROCEDURE — 25000003 PHARM REV CODE 250: Performed by: NURSE PRACTITIONER

## 2022-07-02 PROCEDURE — 20600001 HC STEP DOWN PRIVATE ROOM

## 2022-07-02 PROCEDURE — 80048 BASIC METABOLIC PNL TOTAL CA: CPT | Mod: XB | Performed by: INTERNAL MEDICINE

## 2022-07-02 PROCEDURE — 36415 COLL VENOUS BLD VENIPUNCTURE: CPT | Performed by: INTERNAL MEDICINE

## 2022-07-02 PROCEDURE — 80053 COMPREHEN METABOLIC PANEL: CPT | Performed by: STUDENT IN AN ORGANIZED HEALTH CARE EDUCATION/TRAINING PROGRAM

## 2022-07-02 PROCEDURE — A4216 STERILE WATER/SALINE, 10 ML: HCPCS | Performed by: INTERNAL MEDICINE

## 2022-07-02 PROCEDURE — 27000207 HC ISOLATION

## 2022-07-02 PROCEDURE — 63600175 PHARM REV CODE 636 W HCPCS: Performed by: PHYSICIAN ASSISTANT

## 2022-07-02 PROCEDURE — 25000003 PHARM REV CODE 250: Performed by: PHYSICIAN ASSISTANT

## 2022-07-02 RX ORDER — HYDRALAZINE HYDROCHLORIDE 25 MG/1
25 TABLET, FILM COATED ORAL 3 TIMES DAILY
Status: DISCONTINUED | OUTPATIENT
Start: 2022-07-02 | End: 2022-07-08 | Stop reason: HOSPADM

## 2022-07-02 RX ORDER — ISOSORBIDE DINITRATE 10 MG/1
10 TABLET ORAL 3 TIMES DAILY
Status: DISCONTINUED | OUTPATIENT
Start: 2022-07-02 | End: 2022-07-08 | Stop reason: HOSPADM

## 2022-07-02 RX ADMIN — HYDRALAZINE HYDROCHLORIDE 25 MG: 25 TABLET, FILM COATED ORAL at 09:07

## 2022-07-02 RX ADMIN — INSULIN ASPART 20 UNITS: 100 INJECTION, SOLUTION INTRAVENOUS; SUBCUTANEOUS at 05:07

## 2022-07-02 RX ADMIN — METRONIDAZOLE 500 MG: 500 TABLET ORAL at 01:07

## 2022-07-02 RX ADMIN — ISOSORBIDE DINITRATE 10 MG: 10 TABLET ORAL at 09:07

## 2022-07-02 RX ADMIN — OXYCODONE HYDROCHLORIDE AND ACETAMINOPHEN 1 TABLET: 5; 325 TABLET ORAL at 09:07

## 2022-07-02 RX ADMIN — Medication 10 ML: at 05:07

## 2022-07-02 RX ADMIN — MELATONIN TAB 3 MG 6 MG: 3 TAB at 09:07

## 2022-07-02 RX ADMIN — HYDRALAZINE HYDROCHLORIDE 25 MG: 25 TABLET, FILM COATED ORAL at 05:07

## 2022-07-02 RX ADMIN — Medication 1 CAPSULE: at 08:07

## 2022-07-02 RX ADMIN — ATORVASTATIN CALCIUM 80 MG: 20 TABLET, FILM COATED ORAL at 08:07

## 2022-07-02 RX ADMIN — GABAPENTIN 200 MG: 100 CAPSULE ORAL at 09:07

## 2022-07-02 RX ADMIN — HYDRALAZINE HYDROCHLORIDE 25 MG: 25 TABLET, FILM COATED ORAL at 04:07

## 2022-07-02 RX ADMIN — ISOSORBIDE DINITRATE 10 MG: 10 TABLET ORAL at 04:07

## 2022-07-02 RX ADMIN — INSULIN ASPART 20 UNITS: 100 INJECTION, SOLUTION INTRAVENOUS; SUBCUTANEOUS at 08:07

## 2022-07-02 RX ADMIN — TAMSULOSIN HYDROCHLORIDE 0.4 MG: 0.4 CAPSULE ORAL at 08:07

## 2022-07-02 RX ADMIN — INSULIN DETEMIR 22 UNITS: 100 INJECTION, SOLUTION SUBCUTANEOUS at 09:07

## 2022-07-02 RX ADMIN — DAPTOMYCIN 995 MG: 350 INJECTION, POWDER, LYOPHILIZED, FOR SOLUTION INTRAVENOUS at 05:07

## 2022-07-02 RX ADMIN — Medication 10 ML: at 11:07

## 2022-07-02 RX ADMIN — METRONIDAZOLE 500 MG: 500 TABLET ORAL at 09:07

## 2022-07-02 RX ADMIN — ASPIRIN 81 MG: 81 TABLET, COATED ORAL at 08:07

## 2022-07-02 RX ADMIN — Medication 1 CAPSULE: at 09:07

## 2022-07-02 RX ADMIN — Medication 10 ML: at 12:07

## 2022-07-02 RX ADMIN — INSULIN ASPART 2 UNITS: 100 INJECTION, SOLUTION INTRAVENOUS; SUBCUTANEOUS at 12:07

## 2022-07-02 RX ADMIN — INSULIN ASPART 20 UNITS: 100 INJECTION, SOLUTION INTRAVENOUS; SUBCUTANEOUS at 12:07

## 2022-07-02 RX ADMIN — METRONIDAZOLE 500 MG: 500 TABLET ORAL at 05:07

## 2022-07-02 RX ADMIN — GABAPENTIN 200 MG: 100 CAPSULE ORAL at 08:07

## 2022-07-02 RX ADMIN — NIFEDIPINE 90 MG: 30 TABLET, FILM COATED, EXTENDED RELEASE ORAL at 08:07

## 2022-07-02 NOTE — PROGRESS NOTES
[facility] - Telemetry [facility]  Telemedicine Progress Note    Patient Name: Saji Castañeda  MRN: [id number]  Patient Class: IP- Inpatient   Admission Date: 6/15/2022  Length of Stay: 17 days  Attending Physician: Keerthi Mayorga MD  Primary Care Provider: Mart Escalante MD          Subjective:     Principal Problem:Osteomyelitis of left lower extremity        HPI:  71 y.o. M with PMHx DM2 and PVD with chronic diabetic foot wounds s/p L sided BKA, HTN, asymptomatic bradycardia, CKD 3 and HLD who presented to the ED for worsening R foot pain and drainage. Pt. With known chronic ulceration/osteomylitis to L heel and ID has recommended BKA for definitive therapy, but the patient reports that he has not been interested in amputation. Today, he had his usual f/u appointment with ID, and the patient was referred to the ED for imaging with bone biopsy and potential abx because of worsening drainage and pain. Pt. Denies any current fevers, chills, nausea, or other systemic signs of infection.      Overview/Hospital Course:  Patient admitted to hospital medicine. Podiatry and vascular surgery consulted. Patient underwent bone biopsy and culture. Infectious disease consulted.         This encounter was provided through telemedicine.  Patient was transferred to the telemedicine service on:  06/18/2022   The [facility] 6/15/2022  2:10 PM.  Present with the patient at the time of the telemed/virtual assessment: Telepresenter    Interval History/Overnight Events:   -bradycardic to 30's overnight - patient asymptomatic; he states he has been told he has a slow heart rate but has never seen a cardiologist; no obvious medication etiologies; only first-degree AV block seen in the past on EKG - discussed with cardiology with no further intervention recommended; potassium initially elevated but normal on repeat labs    -he was accepted by a SNF but subsequently declined when they  were unable to provide antibiotic, Daptomycin    Review of Systems   Constitutional:  Positive for activity change.   Respiratory:  Negative for cough and shortness of breath.    Cardiovascular:  Negative for chest pain.   Gastrointestinal:  Negative for diarrhea and vomiting.   Musculoskeletal:  Negative for arthralgias and myalgias.      Inpatient Medications:  Scheduled Meds:   aspirin  81 mg Oral Daily    atorvastatin  80 mg Oral Daily    DAPTOmycin (CUBICIN)  IV  8 mg/kg Intravenous Q24H    gabapentin  200 mg Oral BID    hydrALAZINE  25 mg Oral Q8H    insulin aspart U-100  20 Units Subcutaneous TIDWM    insulin detemir U-100  20 Units Subcutaneous BID    Lactobacillus rhamnosus GG  1 capsule Oral BID    metroNIDAZOLE  500 mg Oral Q8H    NIFEdipine  90 mg Oral Daily    sodium chloride 0.9%  10 mL Intravenous Q6H    tamsulosin  0.4 mg Oral Daily     Continuous Infusions:  PRN Meds:.acetaminophen, albuterol-ipratropium, aluminum-magnesium hydroxide-simethicone, dextrose 10%, dextrose 10%, glucagon (human recombinant), glucose, glucose, hydrALAZINE, insulin aspart U-100, melatonin, naloxone, ondansetron, oxyCODONE-acetaminophen, prochlorperazine, sars-cov-2 (covid-19), sodium chloride 0.9%, Flushing PICC Protocol **AND** sodium chloride 0.9% **AND** sodium chloride 0.9%      Objective:     Temp:  [97.6 °F (36.4 °C)-99 °F (37.2 °C)] 98.4 °F (36.9 °C)  Pulse:  [38-69] 38  Resp:  [18-19] 18  SpO2:  [93 %-98 %] 95 %  BP: (138-177)/(64-72) 165/71      Intake/Output Summary (Last 24 hours) at 7/2/2022 0905  Last data filed at 7/2/2022 0834  Gross per 24 hour   Intake 120 ml   Output 850 ml   Net -730 ml          Body mass index is 40.47 kg/m².    Physical Exam  Vitals and nursing note reviewed.   Constitutional:       General: He is not in acute distress.     Appearance: Normal appearance. He is normal weight. He is not ill-appearing.   HENT:      Head: Normocephalic and atraumatic.      Right Ear: Hearing  normal.      Left Ear: Hearing normal.      Nose: Nose normal.   Eyes:      General: No scleral icterus.        Right eye: No discharge.         Left eye: No discharge.      Extraocular Movements: Extraocular movements intact.   Cardiovascular:      Rate and Rhythm: Normal rate.   Pulmonary:      Effort: Pulmonary effort is normal. No accessory muscle usage or respiratory distress.   Musculoskeletal:      Right lower leg: Edema present.      Left lower leg: Edema present.      Comments: Left foot with dressing; patient notes edema improved from admit   Neurological:      General: No focal deficit present.      Mental Status: He is alert and oriented to person, place, and time.      Cranial Nerves: No cranial nerve deficit.      Motor: No weakness.   Psychiatric:         Attention and Perception: Attention normal.         Mood and Affect: Mood normal.         Speech: Speech normal.         Behavior: Behavior is cooperative.        Labs:  Recent Results (from the past 24 hour(s))   POCT glucose    Collection Time: 07/01/22 11:09 AM   Result Value Ref Range    POCT Glucose 188 (H) 70 - 110 mg/dL   POCT glucose    Collection Time: 07/01/22  3:16 PM   Result Value Ref Range    POCT Glucose 264 (H) 70 - 110 mg/dL   POCT glucose    Collection Time: 07/01/22 10:11 PM   Result Value Ref Range    POCT Glucose 224 (H) 70 - 110 mg/dL   Comprehensive Metabolic Panel (CMP)    Collection Time: 07/02/22  3:18 AM   Result Value Ref Range    Sodium 137 136 - 145 mmol/L    Potassium 5.4 (H) 3.5 - 5.1 mmol/L    Chloride 107 95 - 110 mmol/L    CO2 20 (L) 23 - 29 mmol/L    Glucose 160 (H) 70 - 110 mg/dL    BUN 28 (H) 8 - 23 mg/dL    Creatinine 1.4 0.5 - 1.4 mg/dL    Calcium 8.5 (L) 8.7 - 10.5 mg/dL    Total Protein 6.6 6.0 - 8.4 g/dL    Albumin 2.6 (L) 3.5 - 5.2 g/dL    Total Bilirubin 0.3 0.1 - 1.0 mg/dL    Alkaline Phosphatase 65 55 - 135 U/L    AST 18 10 - 40 U/L    ALT 13 10 - 44 U/L    Anion Gap 10 8 - 16 mmol/L    eGFR if African  American 57.2 (A) >60 mL/min/1.73 m^2    eGFR if non African American 49.5 (A) >60 mL/min/1.73 m^2   CBC with Automated Differential    Collection Time: 07/02/22  3:18 AM   Result Value Ref Range    WBC 8.10 3.90 - 12.70 K/uL    RBC 3.87 (L) 4.60 - 6.20 M/uL    Hemoglobin 10.0 (L) 14.0 - 18.0 g/dL    Hematocrit 32.3 (L) 40.0 - 54.0 %    MCV 84 82 - 98 fL    MCH 25.8 (L) 27.0 - 31.0 pg    MCHC 31.0 (L) 32.0 - 36.0 g/dL    RDW 17.1 (H) 11.5 - 14.5 %    Platelets 372 150 - 450 K/uL    MPV 10.2 9.2 - 12.9 fL    Immature Granulocytes 0.5 0.0 - 0.5 %    Gran # (ANC) 4.7 1.8 - 7.7 K/uL    Immature Grans (Abs) 0.04 0.00 - 0.04 K/uL    Lymph # 1.8 1.0 - 4.8 K/uL    Mono # 0.9 0.3 - 1.0 K/uL    Eos # 0.7 (H) 0.0 - 0.5 K/uL    Baso # 0.06 0.00 - 0.20 K/uL    nRBC 0 0 /100 WBC    Gran % 57.6 38.0 - 73.0 %    Lymph % 21.7 18.0 - 48.0 %    Mono % 10.5 4.0 - 15.0 %    Eosinophil % 9.0 (H) 0.0 - 8.0 %    Basophil % 0.7 0.0 - 1.9 %    Differential Method Automated         Lab Results   Component Value Date    [id number] Negative 06/15/2022       Recent Labs   Lab 06/28/22  0621 06/30/22  0541 07/02/22  0318   WBC 8.71 7.83 8.10   LYMPH 17.6*  1.5 20.1  1.6 21.7  1.8   HGB 10.8* 10.5* 10.0*   HCT 34.0* 32.8* 32.3*    375 372       Recent Labs   Lab 06/27/22 0520 06/28/22  0621 06/30/22  0541 07/01/22 0441 07/02/22 0318      < > 136 136 137   K 4.2   < > 4.7 4.4 5.4*      < > 104 104 107   CO2 24   < > 25 24 20*   BUN 23   < > 27* 27* 28*   CREATININE 1.4   < > 1.6* 1.4 1.4   *   < > 251* 165* 160*   CALCIUM 8.7   < > 8.9 9.5 8.5*   MG 1.7  --   --   --   --    PHOS  --   --   --  4.0  --     < > = values in this interval not displayed.       Recent Labs   Lab 06/28/22 0621 06/30/22 0541 07/01/22 0441 07/02/22 0318   ALKPHOS 66 69  --  65   ALT 14 13  --  13   AST 17 15  --  18   ALBUMIN 2.6* 2.7* 2.7* 2.6*   PROT 6.6 6.7  --  6.6   BILITOT 0.3 0.2  --  0.3          Recent Labs     06/29/22  3031             All labs within the last 24 hours were reviewed.     Microbiology:  Microbiology Results (last 7 days)       Procedure Component Value Units Date/Time    Clostridium difficile EIA [[id number]]     Order Status: Canceled Specimen: Stool     Stool culture [[id number]]     Order Status: No result Specimen: Stool               Imaging  ECG Results    None         No results found for this or any previous visit.      X-Ray Chest 1 View S/P PICC Line by Nursing  Narrative: EXAMINATION:  XR CHEST 1 VIEW S/P PICC LINE BY NURSING    CLINICAL HISTORY:  s/p picc placement;    TECHNIQUE:  Single frontal view of chest.    COMPARISON:  March 3, 2021    FINDINGS:  Right PICC line now evident, tip superimposing lower superior vena caval soft tissues.  Normal heart size.  Normal pulmonary vasculature.  No focal infiltrates.  No pleural fluid or pneumothorax.  No acute bony findings.  Impression: 1. Right PICC line tip superimposes lower superior vena caval soft tissues.  2. No active cardiac or pulmonary findings    Electronically signed by: John Thomas  Date:    06/24/2022  Time:    12:31      All imaging within the last 24 hours was reviewed.       Discharge Planning   OXANA: 7/5/2022     Code Status: Full Code   Is the patient medically ready for discharge?: Yes    Reason for patient still in hospital (select all that apply): Patient trending condition, Treatment, and Pending disposition  Discharge Plan A: Skilled Nursing Facility   Discharge Delays: None known at this time          Assessment/Plan:      * Osteomyelitis of left lower extremity  -With acute on chronic osteomyleitis of LLE including heel with purulent foul smelling drainage  -Podiatry consulted, Not septic appearing. S/p bone biopsy.  -ID consulted. Cultures growing Staph aureus and GBS   - L foot wound anaerobic culture growing Prevotella and Porphyromonas Somerae. PO Flagyl added.    - L foot bone culture growing Staph species, continued IV  ceftriaxone and vancomycin (per Pharmacy dosing).  - with acute renal insufficiency, vancomycin changed to daptomycin and ceftriaxone discontinued as not needed based on current cultures. Continuing Flagyl  -Continuing daptomycin and Flagyl; Estimated end date of daptomycin: 7/28/22. Estimated end date of Flagyl: 7/4/22    Acute renal insufficiency  Estimated Creatinine Clearance: 61.2 mL/min (based on SCr of 1.4 mg/dL).  Per med history, patient no longer taking ARB.  sCr up slightly and vanco level > 20.  Discontinued ARB, holding diuretic for now. Trial of gentle hydration ineffective.  Discontinued vancomycin.  -variable sCr     Peripheral arterial disease  -PVD contributing to current non-healing foot wound. Arterial US shows LLE atherosclerotic disease, areas of high-grade stenosis within the L popliteal artery  -Continue home statin, ASA  -Vascular Surgery consulted - patient considering surgery (BKA); seems reluctant primarily due to the time he expects it to take to obtain a prosthetic limb. Wants to try conservative management with antibiotics.  -Plan for SNF for IV antibiotics, PT/OT  - Follow up with Podiatry, ID and Vascular Surgery    Asymptomatic Mobitz (type) I (Wenckebach's) atrioventricular block  -Bradycardic in ED but asymptomatic, chronic issue  -Repeat EKG 6/19:  BPM 44 Normal sinus rhythm - AV block, 2:1 - Nonspecific T wave abnormality   -avoid BB and chronotropic CCB  -cardiology reviewed with no intervention recommended; will monitor electrolytes    Chronic kidney disease, stage III (moderate)  -sCr baseline 1.1  -Continue to monitor  -sCr remained > baseline; near to baseline 6/26  -sCr 1.4 on 6/29; HCTZ was resumed on 6/27 for hypertension but stopped on 6/30 when Cr elevated    Type 2 diabetes, uncontrolled, with neuropathy  -Last A1c 2020, repeat > 9%. Per patient, taking 68 units Lantus QHS along with metformin  mg and glipizide 10 mg daily (Possibly, he is not sure)  -Per Pharm  med history: patient is no longer taking insulin  -Mildly hypoglycemic on admit with BG 63  -decreased dose of basal insulin to 15 units Levemir, Titrate up as needed. Added prandial insulin 6/19  -worsening hyperglycemia as oral agents wash out. Levemir increased to BID and aspart increased with meals.  - doses adjusted 6/21; increased for 6/23, and again for 6/25  - some hyperglycemia due to dietary indiscretion; decreased basal insulin 6/27  - BGs controlled when patient adheres to diet (no regular sodas)    Essential hypertension  Continued losartan 100mg and HCTZ 25 mg, with hydralazine 25 mg PO q8h PRN  Added nifedipine 6/18  Consulted PharmD for med history: patient reports no longer taking isosorbide-hydralazine, losartan, and losartan-HCTZ  Discontinued ARB, held HCTZ due to increasing sCr. Continued nifedipine for BP control.  Resumed diuretic now that renal insufficiency improved; but did not tolerate so stopped  Hydralazine dose changed multiple times; isordil added to regimen on 7/2      VTE Risk Mitigation (From admission, onward)         Ordered     IP VTE HIGH RISK PATIENT  Once         06/15/22 1957     Place sequential compression device  Until discontinued         06/15/22 1957                I have assessed these findings virtually using a telemed platform and with assistance of the bedside nurse.          The attending portion of this evaluation, treatment, and documentation was performed per Keerthi Mayorga MD via Telemedicine AudioVisual using the secure Vid[employer] software platform with 2 way audio/video. The provider was located off-site and the patient is located in the hospital. The aforementioned video software was utilized to document the relevant history and physical exam    Keerthi Mayorga MD  Department of Hospital Medicine   [facility] - Telemetry Stepdown

## 2022-07-02 NOTE — PLAN OF CARE
Problem: Adult Inpatient Plan of Care  Goal: Plan of Care Review  Outcome: Ongoing, Progressing  Goal: Patient-Specific Goal (Individualized)  Outcome: Ongoing, Progressing  Goal: Optimal Comfort and Wellbeing  Outcome: Ongoing, Progressing  Intervention: Provide Person-Centered Care  Flowsheets (Taken 7/2/2022 1821)  Trust Relationship/Rapport:   reassurance provided   thoughts/feelings acknowledged  Goal: Readiness for Transition of Care  Outcome: Ongoing, Progressing     Problem: Diabetes Comorbidity  Goal: Blood Glucose Level Within Targeted Range  Outcome: Ongoing, Progressing     Problem: Diabetes Comorbidity  Goal: Blood Glucose Level Within Targeted Range  Outcome: Ongoing, Progressing     Problem: Impaired Wound Healing  Goal: Optimal Wound Healing  Outcome: Ongoing, Progressing     Problem: Infection  Goal: Absence of Infection Signs and Symptoms  Outcome: Ongoing, Progressing     Problem: Skin Injury Risk Increased  Goal: Skin Health and Integrity  Outcome: Ongoing, Progressing  Intervention: Optimize Skin Protection  Flowsheets (Taken 7/2/2022 1821)  Pressure Reduction Techniques:   frequent weight shift encouraged   weight shift assistance provided   heels elevated off bed  Pressure Reduction Devices: heel offloading device utilized  Skin Protection:   adhesive use limited   incontinence pads utilized   protective footwear used     Problem: Fall Injury Risk  Goal: Absence of Fall and Fall-Related Injury  Outcome: Ongoing, Progressing  Intervention: Promote Injury-Free Environment  Flowsheets (Taken 7/2/2022 1821)  Safety Promotion/Fall Prevention:   assistive device/personal item within reach   Fall Risk reviewed with patient/family   side rails raised x 3   nonskid shoes/socks when out of bed     POC reviewed. AAOX4. VVS. Wound care performed. Encourage reposition. No problems. Call light and urinal @bedside. Bed lowest position. [facility]

## 2022-07-02 NOTE — ASSESSMENT & PLAN NOTE
-Bradycardic in ED but asymptomatic, chronic issue  -Repeat EKG 6/19:  BPM 44 Normal sinus rhythm - AV block, 2:1 - Nonspecific T wave abnormality   -avoid BB and chronotropic CCB  -cardiology reviewed with no intervention recommended; will monitor electrolytes

## 2022-07-02 NOTE — SUBJECTIVE & OBJECTIVE
This encounter was provided through telemedicine.  Patient was transferred to the telemedicine service on:  06/18/2022   The patient location is: 8092/8092 A admitted 6/15/2022  2:10 PM.  Present with the patient at the time of the telemed/virtual assessment: Telepresenter    Interval History/Overnight Events:   -bradycardic to 30's overnight - patient asymptomatic; he states he has been told he has a slow heart rate but has never seen a cardiologist; no obvious medication etiologies; only first-degree AV block seen in the past on EKG - discussed with cardiology with no further intervention recommended; potassium initially elevated but normal on repeat labs    -he was accepted by a SNF but subsequently declined when they were unable to provide antibiotic, Daptomycin    Review of Systems   Constitutional:  Positive for activity change.   Respiratory:  Negative for cough and shortness of breath.    Cardiovascular:  Negative for chest pain.   Gastrointestinal:  Negative for diarrhea and vomiting.   Musculoskeletal:  Negative for arthralgias and myalgias.      Inpatient Medications:  Scheduled Meds:   aspirin  81 mg Oral Daily    atorvastatin  80 mg Oral Daily    DAPTOmycin (CUBICIN)  IV  8 mg/kg Intravenous Q24H    gabapentin  200 mg Oral BID    hydrALAZINE  25 mg Oral Q8H    insulin aspart U-100  20 Units Subcutaneous TIDWM    insulin detemir U-100  20 Units Subcutaneous BID    Lactobacillus rhamnosus GG  1 capsule Oral BID    metroNIDAZOLE  500 mg Oral Q8H    NIFEdipine  90 mg Oral Daily    sodium chloride 0.9%  10 mL Intravenous Q6H    tamsulosin  0.4 mg Oral Daily     Continuous Infusions:  PRN Meds:.acetaminophen, albuterol-ipratropium, aluminum-magnesium hydroxide-simethicone, dextrose 10%, dextrose 10%, glucagon (human recombinant), glucose, glucose, hydrALAZINE, insulin aspart U-100, melatonin, naloxone, ondansetron, oxyCODONE-acetaminophen, prochlorperazine, sars-cov-2 (covid-19), sodium chloride 0.9%,  Flushing PICC Protocol **AND** sodium chloride 0.9% **AND** sodium chloride 0.9%      Objective:     Temp:  [97.6 °F (36.4 °C)-99 °F (37.2 °C)] 98.4 °F (36.9 °C)  Pulse:  [38-69] 38  Resp:  [18-19] 18  SpO2:  [93 %-98 %] 95 %  BP: (138-177)/(64-72) 165/71      Intake/Output Summary (Last 24 hours) at 7/2/2022 0914  Last data filed at 7/2/2022 0834  Gross per 24 hour   Intake 120 ml   Output 850 ml   Net -730 ml          Body mass index is 40.47 kg/m².    Physical Exam  Vitals and nursing note reviewed.   Constitutional:       General: He is not in acute distress.     Appearance: Normal appearance. He is normal weight. He is not ill-appearing.   HENT:      Head: Normocephalic and atraumatic.      Right Ear: Hearing normal.      Left Ear: Hearing normal.      Nose: Nose normal.   Eyes:      General: No scleral icterus.        Right eye: No discharge.         Left eye: No discharge.      Extraocular Movements: Extraocular movements intact.   Cardiovascular:      Rate and Rhythm: Normal rate.   Pulmonary:      Effort: Pulmonary effort is normal. No accessory muscle usage or respiratory distress.   Musculoskeletal:      Right lower leg: Edema present.      Left lower leg: Edema present.      Comments: Left foot with dressing; patient notes edema improved from admit   Neurological:      General: No focal deficit present.      Mental Status: He is alert and oriented to person, place, and time.      Cranial Nerves: No cranial nerve deficit.      Motor: No weakness.   Psychiatric:         Attention and Perception: Attention normal.         Mood and Affect: Mood normal.         Speech: Speech normal.         Behavior: Behavior is cooperative.        Labs:  Recent Results (from the past 24 hour(s))   POCT glucose    Collection Time: 07/01/22 11:09 AM   Result Value Ref Range    POCT Glucose 188 (H) 70 - 110 mg/dL   POCT glucose    Collection Time: 07/01/22  3:16 PM   Result Value Ref Range    POCT Glucose 264 (H) 70 - 110  mg/dL   POCT glucose    Collection Time: 07/01/22 10:11 PM   Result Value Ref Range    POCT Glucose 224 (H) 70 - 110 mg/dL   Comprehensive Metabolic Panel (CMP)    Collection Time: 07/02/22  3:18 AM   Result Value Ref Range    Sodium 137 136 - 145 mmol/L    Potassium 5.4 (H) 3.5 - 5.1 mmol/L    Chloride 107 95 - 110 mmol/L    CO2 20 (L) 23 - 29 mmol/L    Glucose 160 (H) 70 - 110 mg/dL    BUN 28 (H) 8 - 23 mg/dL    Creatinine 1.4 0.5 - 1.4 mg/dL    Calcium 8.5 (L) 8.7 - 10.5 mg/dL    Total Protein 6.6 6.0 - 8.4 g/dL    Albumin 2.6 (L) 3.5 - 5.2 g/dL    Total Bilirubin 0.3 0.1 - 1.0 mg/dL    Alkaline Phosphatase 65 55 - 135 U/L    AST 18 10 - 40 U/L    ALT 13 10 - 44 U/L    Anion Gap 10 8 - 16 mmol/L    eGFR if African American 57.2 (A) >60 mL/min/1.73 m^2    eGFR if non African American 49.5 (A) >60 mL/min/1.73 m^2   CBC with Automated Differential    Collection Time: 07/02/22  3:18 AM   Result Value Ref Range    WBC 8.10 3.90 - 12.70 K/uL    RBC 3.87 (L) 4.60 - 6.20 M/uL    Hemoglobin 10.0 (L) 14.0 - 18.0 g/dL    Hematocrit 32.3 (L) 40.0 - 54.0 %    MCV 84 82 - 98 fL    MCH 25.8 (L) 27.0 - 31.0 pg    MCHC 31.0 (L) 32.0 - 36.0 g/dL    RDW 17.1 (H) 11.5 - 14.5 %    Platelets 372 150 - 450 K/uL    MPV 10.2 9.2 - 12.9 fL    Immature Granulocytes 0.5 0.0 - 0.5 %    Gran # (ANC) 4.7 1.8 - 7.7 K/uL    Immature Grans (Abs) 0.04 0.00 - 0.04 K/uL    Lymph # 1.8 1.0 - 4.8 K/uL    Mono # 0.9 0.3 - 1.0 K/uL    Eos # 0.7 (H) 0.0 - 0.5 K/uL    Baso # 0.06 0.00 - 0.20 K/uL    nRBC 0 0 /100 WBC    Gran % 57.6 38.0 - 73.0 %    Lymph % 21.7 18.0 - 48.0 %    Mono % 10.5 4.0 - 15.0 %    Eosinophil % 9.0 (H) 0.0 - 8.0 %    Basophil % 0.7 0.0 - 1.9 %    Differential Method Automated         Lab Results   Component Value Date    [id number] Negative 06/15/2022       Recent Labs   Lab 06/28/22  0621 06/30/22  0541 07/02/22  0318   WBC 8.71 7.83 8.10   LYMPH 17.6*  1.5 20.1  1.6 21.7  1.8   HGB 10.8* 10.5* 10.0*   HCT 34.0* 32.8* 32.3*     375 372       Recent Labs   Lab 06/27/22  0520 06/28/22  0621 06/30/22  0541 07/01/22 0441 07/02/22 0318      < > 136 136 137   K 4.2   < > 4.7 4.4 5.4*      < > 104 104 107   CO2 24   < > 25 24 20*   BUN 23   < > 27* 27* 28*   CREATININE 1.4   < > 1.6* 1.4 1.4   *   < > 251* 165* 160*   CALCIUM 8.7   < > 8.9 9.5 8.5*   MG 1.7  --   --   --   --    PHOS  --   --   --  4.0  --     < > = values in this interval not displayed.       Recent Labs   Lab 06/28/22 0621 06/30/22 0541 07/01/22 0441 07/02/22 0318   ALKPHOS 66 69  --  65   ALT 14 13  --  13   AST 17 15  --  18   ALBUMIN 2.6* 2.7* 2.7* 2.6*   PROT 6.6 6.7  --  6.6   BILITOT 0.3 0.2  --  0.3          Recent Labs     06/29/22  2350            All labs within the last 24 hours were reviewed.     Microbiology:  Microbiology Results (last 7 days)       Procedure Component Value Units Date/Time    Clostridium difficile EIA [[id number]]     Order Status: Canceled Specimen: Stool     Stool culture [[id number]]     Order Status: No result Specimen: Stool               Imaging  ECG Results    None         No results found for this or any previous visit.      X-Ray Chest 1 View S/P PICC Line by Nursing  Narrative: EXAMINATION:  XR CHEST 1 VIEW S/P PICC LINE BY NURSING    CLINICAL HISTORY:  s/p picc placement;    TECHNIQUE:  Single frontal view of chest.    COMPARISON:  March 3, 2021    FINDINGS:  Right PICC line now evident, tip superimposing lower superior vena caval soft tissues.  Normal heart size.  Normal pulmonary vasculature.  No focal infiltrates.  No pleural fluid or pneumothorax.  No acute bony findings.  Impression: 1. Right PICC line tip superimposes lower superior vena caval soft tissues.  2. No active cardiac or pulmonary findings    Electronically signed by: John Thomas  Date:    06/24/2022  Time:    12:31      All imaging within the last 24 hours was reviewed.       Discharge Planning   OXANA: 7/5/2022     Code Status:  Full Code   Is the patient medically ready for discharge?: Yes    Reason for patient still in hospital (select all that apply): Patient trending condition, Treatment, and Pending disposition  Discharge Plan A: Skilled Nursing Facility   Discharge Delays: None known at this time

## 2022-07-02 NOTE — ASSESSMENT & PLAN NOTE
Continued losartan 100mg and HCTZ 25 mg, with hydralazine 25 mg PO q8h PRN  Added nifedipine 6/18  Consulted PharmD for med history: patient reports no longer taking isosorbide-hydralazine, losartan, and losartan-HCTZ  Discontinued ARB, held HCTZ due to increasing sCr. Continued nifedipine for BP control.  Resumed diuretic now that renal insufficiency improved; but did not tolerate so stopped  Hydralazine dose changed multiple times; isordil added to regimen on 7/2

## 2022-07-02 NOTE — NURSING
Notified JOHNNY Joyce pt's HR sustaining below 40. Pt still asymptomatic. Rapid RN also came to evaluate pt. Will continue to monitor

## 2022-07-02 NOTE — ASSESSMENT & PLAN NOTE
-PVD contributing to current non-healing foot wound. Arterial US shows LLE atherosclerotic disease, areas of high-grade stenosis within the L popliteal artery  -Continue home statin, ASA  -Vascular Surgery consulted - patient considering surgery (BKA); seems reluctant primarily due to the time he expects it to take to obtain a prosthetic limb. Wants to try conservative management with antibiotics.  -Plan for SNF for IV antibiotics, PT/OT  - Follow up with Podiatry, ID and Vascular Surgery

## 2022-07-02 NOTE — CARE UPDATE
Cardiology Care Update Note     Cardiology consulted given pt's HR in high 30s and 40s at night and during the day. Pt is asymptomatic and having no issues at this time. No syncopal episodes noted.  Would avoid AV tj blocking medications and replete electrolytes agressively. No indication for any further cardiac workup or PPM given pt without sx.    Cardiology will sign off     Nacho Beck   Department of Cardiovascular Disease, PGY-4   [facility]

## 2022-07-03 LAB
POCT GLUCOSE: 102 MG/DL (ref 70–110)
POCT GLUCOSE: 141 MG/DL (ref 70–110)
POCT GLUCOSE: 155 MG/DL (ref 70–110)
POCT GLUCOSE: 91 MG/DL (ref 70–110)

## 2022-07-03 PROCEDURE — 99232 PR SUBSEQUENT HOSPITAL CARE,LEVL II: ICD-10-PCS | Mod: 95,,, | Performed by: INTERNAL MEDICINE

## 2022-07-03 PROCEDURE — 20600001 HC STEP DOWN PRIVATE ROOM

## 2022-07-03 PROCEDURE — C9399 UNCLASSIFIED DRUGS OR BIOLOG: HCPCS | Performed by: INTERNAL MEDICINE

## 2022-07-03 PROCEDURE — A4216 STERILE WATER/SALINE, 10 ML: HCPCS | Performed by: INTERNAL MEDICINE

## 2022-07-03 PROCEDURE — 25000003 PHARM REV CODE 250: Performed by: PHYSICIAN ASSISTANT

## 2022-07-03 PROCEDURE — 25000003 PHARM REV CODE 250: Performed by: INTERNAL MEDICINE

## 2022-07-03 PROCEDURE — 93005 ELECTROCARDIOGRAM TRACING: CPT

## 2022-07-03 PROCEDURE — 25000003 PHARM REV CODE 250: Performed by: HOSPITALIST

## 2022-07-03 PROCEDURE — 93010 ELECTROCARDIOGRAM REPORT: CPT | Mod: ,,, | Performed by: INTERNAL MEDICINE

## 2022-07-03 PROCEDURE — 93010 EKG 12-LEAD: ICD-10-PCS | Mod: ,,, | Performed by: INTERNAL MEDICINE

## 2022-07-03 PROCEDURE — 99232 SBSQ HOSP IP/OBS MODERATE 35: CPT | Mod: 95,,, | Performed by: INTERNAL MEDICINE

## 2022-07-03 PROCEDURE — 63600175 PHARM REV CODE 636 W HCPCS: Performed by: PHYSICIAN ASSISTANT

## 2022-07-03 PROCEDURE — 27000207 HC ISOLATION

## 2022-07-03 RX ADMIN — NIFEDIPINE 90 MG: 30 TABLET, FILM COATED, EXTENDED RELEASE ORAL at 08:07

## 2022-07-03 RX ADMIN — ISOSORBIDE DINITRATE 10 MG: 10 TABLET ORAL at 10:07

## 2022-07-03 RX ADMIN — METRONIDAZOLE 500 MG: 500 TABLET ORAL at 05:07

## 2022-07-03 RX ADMIN — TAMSULOSIN HYDROCHLORIDE 0.4 MG: 0.4 CAPSULE ORAL at 08:07

## 2022-07-03 RX ADMIN — HYDRALAZINE HYDROCHLORIDE 25 MG: 25 TABLET, FILM COATED ORAL at 10:07

## 2022-07-03 RX ADMIN — GABAPENTIN 200 MG: 100 CAPSULE ORAL at 10:07

## 2022-07-03 RX ADMIN — METRONIDAZOLE 500 MG: 500 TABLET ORAL at 10:07

## 2022-07-03 RX ADMIN — Medication 10 ML: at 05:07

## 2022-07-03 RX ADMIN — Medication 1 CAPSULE: at 08:07

## 2022-07-03 RX ADMIN — HYDRALAZINE HYDROCHLORIDE 25 MG: 25 TABLET, FILM COATED ORAL at 08:07

## 2022-07-03 RX ADMIN — Medication 10 ML: at 12:07

## 2022-07-03 RX ADMIN — DAPTOMYCIN 995 MG: 350 INJECTION, POWDER, LYOPHILIZED, FOR SOLUTION INTRAVENOUS at 05:07

## 2022-07-03 RX ADMIN — INSULIN ASPART 20 UNITS: 100 INJECTION, SOLUTION INTRAVENOUS; SUBCUTANEOUS at 08:07

## 2022-07-03 RX ADMIN — INSULIN ASPART 20 UNITS: 100 INJECTION, SOLUTION INTRAVENOUS; SUBCUTANEOUS at 12:07

## 2022-07-03 RX ADMIN — Medication 1 CAPSULE: at 10:07

## 2022-07-03 RX ADMIN — HYDRALAZINE HYDROCHLORIDE 25 MG: 25 TABLET, FILM COATED ORAL at 03:07

## 2022-07-03 RX ADMIN — INSULIN ASPART 20 UNITS: 100 INJECTION, SOLUTION INTRAVENOUS; SUBCUTANEOUS at 05:07

## 2022-07-03 RX ADMIN — ASPIRIN 81 MG: 81 TABLET, COATED ORAL at 08:07

## 2022-07-03 RX ADMIN — ISOSORBIDE DINITRATE 10 MG: 10 TABLET ORAL at 08:07

## 2022-07-03 RX ADMIN — ATORVASTATIN CALCIUM 80 MG: 20 TABLET, FILM COATED ORAL at 08:07

## 2022-07-03 RX ADMIN — ISOSORBIDE DINITRATE 10 MG: 10 TABLET ORAL at 03:07

## 2022-07-03 RX ADMIN — INSULIN DETEMIR 22 UNITS: 100 INJECTION, SOLUTION SUBCUTANEOUS at 08:07

## 2022-07-03 RX ADMIN — INSULIN DETEMIR 22 UNITS: 100 INJECTION, SOLUTION SUBCUTANEOUS at 10:07

## 2022-07-03 RX ADMIN — METRONIDAZOLE 500 MG: 500 TABLET ORAL at 01:07

## 2022-07-03 RX ADMIN — GABAPENTIN 200 MG: 100 CAPSULE ORAL at 08:07

## 2022-07-03 NOTE — PLAN OF CARE
Problem: Adult Inpatient Plan of Care  Goal: Plan of Care Review  Outcome: Ongoing, Progressing  Goal: Patient-Specific Goal (Individualized)  Outcome: Ongoing, Progressing  Goal: Absence of Hospital-Acquired Illness or Injury  Outcome: Ongoing, Progressing  Goal: Optimal Comfort and Wellbeing  Outcome: Ongoing, Progressing  Goal: Readiness for Transition of Care  Outcome: Ongoing, Progressing     Problem: Bariatric Environmental Safety  Goal: Safety Maintained with Care  Outcome: Ongoing, Progressing     Problem: Diabetes Comorbidity  Goal: Blood Glucose Level Within Targeted Range  Outcome: Ongoing, Progressing     Problem: Fluid and Electrolyte Imbalance (Acute Kidney Injury/Impairment)  Goal: Fluid and Electrolyte Balance  Outcome: Ongoing, Progressing     Problem: Oral Intake Inadequate (Acute Kidney Injury/Impairment)  Goal: Optimal Nutrition Intake  Outcome: Ongoing, Progressing     Problem: Renal Function Impairment (Acute Kidney Injury/Impairment)  Goal: Effective Renal Function  Outcome: Ongoing, Progressing     Problem: Impaired Wound Healing  Goal: Optimal Wound Healing  Outcome: Ongoing, Progressing     Problem: Skin Injury Risk Increased  Goal: Skin Health and Integrity  Outcome: Ongoing, Progressing     Problem: Infection  Goal: Absence of Infection Signs and Symptoms  Outcome: Ongoing, Progressing     Problem: Fall Injury Risk  Goal: Absence of Fall and Fall-Related Injury  Outcome: Ongoing, Progressing

## 2022-07-03 NOTE — NURSING
" Pt heart rate dropping as low as 33 but sustaining at high 30" to 40's. Asymptomatic. Virtual team 10 notify.   "

## 2022-07-03 NOTE — CARE UPDATE
RAPID RESPONSE NURSE PROACTIVE ROUNDING NOTE       Time of Visit: 1005    Admit Date: 6/15/2022  LOS: 18  Code Status: Full Code   Date of Visit: 2022  : 1950  Age: 72 y.o.  Sex: male  Race: Black or   Bed: 8092/8092 A:   MRN: [id number]  Was the patient discharged from an ICU this admission? No   Was the patient discharged from a PACU within last 24 hours? No   Did the patient receive conscious sedation/general anesthesia in last 24 hours? No  Was the patient in the ED within the past 24 hours? No  Was the patient on NIPPV within the past 24 hours? No   Attending Physician: Keerthi Mayorga MD  Primary Service: [facility] VIRTUAL TEAM 10   Time spent at the bedside: < 15 min    SITUATION    Notified by telemetry via phone call.  Reason for alert: bradycardia  Called to evaluate the patient for Dysrythmia    BACKGROUND     Why is the patient in the hospital?: Osteomyelitis of left lower extremity    Patient has a past medical history of Arthritis, Diabetes mellitus, Diabetes mellitus, type 2, Hyperlipidemia, and Osteomyelitis.    Last Vitals:  Temp: 97.9 °F (36.6 °C) ( 155)  Pulse: 51 ( 155)  Resp: 18 ( 155)  BP: 137/62 (1559)  SpO2: 94 % (1559)    24 Hours Vitals Range:  Temp:  [97.7 °F (36.5 °C)-98.6 °F (37 °C)]   Pulse:  [40-60]   Resp:  [16-21]   BP: (120-164)/(57-72)   SpO2:  [92 %-98 %]     Labs:  Recent Labs     22   WBC 8.10   HGB 10.0*   HCT 32.3*          Recent Labs     22  0441 22  0318 22  1133    137 136   K 4.4 5.4* 4.0    107 105   CO2 24 20* 25   CREATININE 1.4 1.4 1.4   * 160* 211*   PHOS 4.0  --  3.2   MG  --   --  1.9        No results for input(s): PH, PCO2, PO2, HCO3, POCSATURATED, BE in the last 72 hours.     ASSESSMENT    Physical Exam    INTERVENTIONS    The patient was seen for a Cardiac problem. Staff concerns included bradycardia. The following interventions were performed: continuous  cardiac monitoring.    RECOMMENDATIONS    Primary team already consulted cardiology.  Pt is asymptomatic.  Medication list reviewed.  Recommend a working IV and pacing pads/Zoll nearby.    PROVIDER ESCALATION    Yes/No  no    Orders received and case discussed with NA.    Disposition: Remain in room 8092.    FOLLOW-UP    charge RNGirish updated on plan of care. Instructed to call the Rapid Response Nurse, Marisol Charles RN at [phone] for additional questions or concerns.

## 2022-07-03 NOTE — SUBJECTIVE & OBJECTIVE
This encounter was provided through telemedicine.  Patient was transferred to the telemedicine service on:  06/18/2022   The patient location is: 8092/8092 A admitted 6/15/2022  2:10 PM.  Present with the patient at the time of the telemed/virtual assessment: Telepresenter    Interval History/Overnight Events:   Again bradycardic to 30's and asymptomatic - only 1st degree block on previous ECG; no foot pain; slept well; denies hx of snoring or sleep apnea    -he was accepted by a SNF but subsequently declined when they were unable to provide antibiotic, Daptomycin; now pursuing LTAC    Review of Systems   Constitutional:  Positive for activity change.   Respiratory:  Negative for cough and shortness of breath.    Cardiovascular:  Negative for chest pain.   Gastrointestinal:  Negative for diarrhea and vomiting.   Musculoskeletal:  Negative for arthralgias and myalgias.      Inpatient Medications:  Scheduled Meds:   aspirin  81 mg Oral Daily    atorvastatin  80 mg Oral Daily    DAPTOmycin (CUBICIN)  IV  8 mg/kg Intravenous Q24H    gabapentin  200 mg Oral BID    hydrALAZINE  25 mg Oral TID    insulin aspart U-100  20 Units Subcutaneous TIDWM    insulin detemir U-100  22 Units Subcutaneous BID    isosorbide dinitrate  10 mg Oral TID    Lactobacillus rhamnosus GG  1 capsule Oral BID    metroNIDAZOLE  500 mg Oral Q8H    NIFEdipine  90 mg Oral Daily    sodium chloride 0.9%  10 mL Intravenous Q6H    tamsulosin  0.4 mg Oral Daily     Continuous Infusions:  PRN Meds:.acetaminophen, albuterol-ipratropium, aluminum-magnesium hydroxide-simethicone, dextrose 10%, dextrose 10%, glucagon (human recombinant), glucose, glucose, hydrALAZINE, insulin aspart U-100, melatonin, naloxone, ondansetron, oxyCODONE-acetaminophen, prochlorperazine, sars-cov-2 (covid-19), sodium chloride 0.9%, Flushing PICC Protocol **AND** sodium chloride 0.9% **AND** sodium chloride 0.9%      Objective:     Temp:  [97.5 °F (36.4 °C)-98.6 °F (37 °C)] 98.1 °F  (36.7 °C)  Pulse:  [39-60] 44  Resp:  [16-21] 18  SpO2:  [92 %-98 %] 92 %  BP: (120-164)/(57-71) 144/63      Intake/Output Summary (Last 24 hours) at 7/3/2022 0917  Last data filed at 7/3/2022 0736  Gross per 24 hour   Intake 600 ml   Output 1000 ml   Net -400 ml          Body mass index is 40.47 kg/m².    Physical Exam  Vitals and nursing note reviewed.   Constitutional:       General: He is not in acute distress.     Appearance: Normal appearance. He is normal weight. He is not ill-appearing.   HENT:      Head: Normocephalic and atraumatic.      Right Ear: Hearing normal.      Left Ear: Hearing normal.      Nose: Nose normal.   Eyes:      General: No scleral icterus.        Right eye: No discharge.         Left eye: No discharge.      Extraocular Movements: Extraocular movements intact.   Cardiovascular:      Rate and Rhythm: Normal rate.   Pulmonary:      Effort: Pulmonary effort is normal. No accessory muscle usage or respiratory distress.   Musculoskeletal:      Right lower leg: Edema present.      Left lower leg: Edema present.      Comments: Left foot with dressing; patient notes edema improved from admit   Neurological:      General: No focal deficit present.      Mental Status: He is alert and oriented to person, place, and time.      Cranial Nerves: No cranial nerve deficit.      Motor: No weakness.   Psychiatric:         Attention and Perception: Attention normal.         Mood and Affect: Mood normal.         Speech: Speech normal.         Behavior: Behavior is cooperative.        Labs:  Recent Results (from the past 24 hour(s))   POCT glucose    Collection Time: 07/02/22 11:07 AM   Result Value Ref Range    POCT Glucose 227 (H) 70 - 110 mg/dL   Basic metabolic panel    Collection Time: 07/02/22 11:33 AM   Result Value Ref Range    Sodium 136 136 - 145 mmol/L    Potassium 4.0 3.5 - 5.1 mmol/L    Chloride 105 95 - 110 mmol/L    CO2 25 23 - 29 mmol/L    Glucose 211 (H) 70 - 110 mg/dL    BUN 27 (H) 8 - 23  mg/dL    Creatinine 1.4 0.5 - 1.4 mg/dL    Calcium 9.1 8.7 - 10.5 mg/dL    Anion Gap 6 (L) 8 - 16 mmol/L    eGFR if African American 57.2 (A) >60 mL/min/1.73 m^2    eGFR if non African American 49.5 (A) >60 mL/min/1.73 m^2   Phosphorus    Collection Time: 07/02/22 11:33 AM   Result Value Ref Range    Phosphorus 3.2 2.7 - 4.5 mg/dL   Magnesium    Collection Time: 07/02/22 11:33 AM   Result Value Ref Range    Magnesium 1.9 1.6 - 2.6 mg/dL   POCT glucose    Collection Time: 07/02/22  3:30 PM   Result Value Ref Range    POCT Glucose 168 (H) 70 - 110 mg/dL   POCT glucose    Collection Time: 07/02/22  9:28 PM   Result Value Ref Range    POCT Glucose 127 (H) 70 - 110 mg/dL   POCT glucose    Collection Time: 07/03/22  7:33 AM   Result Value Ref Range    POCT Glucose 155 (H) 70 - 110 mg/dL        Lab Results   Component Value Date    [id number] Negative 06/15/2022       Recent Labs   Lab 06/28/22  0621 06/30/22  0541 07/02/22  0318   WBC 8.71 7.83 8.10   LYMPH 17.6*  1.5 20.1  1.6 21.7  1.8   HGB 10.8* 10.5* 10.0*   HCT 34.0* 32.8* 32.3*    375 372       Recent Labs   Lab 06/27/22  0520 06/28/22  0621 07/01/22  0441 07/02/22  0318 07/02/22  1133      < > 136 137 136   K 4.2   < > 4.4 5.4* 4.0      < > 104 107 105   CO2 24   < > 24 20* 25   BUN 23   < > 27* 28* 27*   CREATININE 1.4   < > 1.4 1.4 1.4   *   < > 165* 160* 211*   CALCIUM 8.7   < > 9.5 8.5* 9.1   MG 1.7  --   --   --  1.9   PHOS  --   --  4.0  --  3.2    < > = values in this interval not displayed.       Recent Labs   Lab 06/28/22  0621 06/30/22  0541 07/01/22  0441 07/02/22  0318   ALKPHOS 66 69  --  65   ALT 14 13  --  13   AST 17 15  --  18   ALBUMIN 2.6* 2.7* 2.7* 2.6*   PROT 6.6 6.7  --  6.6   BILITOT 0.3 0.2  --  0.3          No results for input(s): DDIMER, FERRITIN, CRP, LDH, BNP, TROPONINI, CPK in the last 72 hours.    Invalid input(s): PROCALCITONIN      All labs within the last 24 hours were reviewed.      Microbiology:  Microbiology Results (last 7 days)       Procedure Component Value Units Date/Time    Clostridium difficile EIA [[id number]]     Order Status: Canceled Specimen: Stool     Stool culture [[id number]]     Order Status: Canceled Specimen: Stool               Imaging  ECG Results    None         No results found for this or any previous visit.      X-Ray Chest 1 View S/P PICC Line by Nursing  Narrative: EXAMINATION:  XR CHEST 1 VIEW S/P PICC LINE BY NURSING    CLINICAL HISTORY:  s/p picc placement;    TECHNIQUE:  Single frontal view of chest.    COMPARISON:  March 3, 2021    FINDINGS:  Right PICC line now evident, tip superimposing lower superior vena caval soft tissues.  Normal heart size.  Normal pulmonary vasculature.  No focal infiltrates.  No pleural fluid or pneumothorax.  No acute bony findings.  Impression: 1. Right PICC line tip superimposes lower superior vena caval soft tissues.  2. No active cardiac or pulmonary findings    Electronically signed by: John Thomas  Date:    06/24/2022  Time:    12:31      All imaging within the last 24 hours was reviewed.       Discharge Planning   OXANA: 7/5/2022     Code Status: Full Code   Is the patient medically ready for discharge?: Yes    Reason for patient still in hospital (select all that apply): Patient trending condition, Treatment, and Pending disposition  Discharge Plan A: Skilled Nursing Facility   Discharge Delays: None known at this time

## 2022-07-03 NOTE — PLAN OF CARE
Problem: Adult Inpatient Plan of Care  Goal: Plan of Care Review  Outcome: Ongoing, Progressing  Goal: Patient-Specific Goal (Individualized)  Outcome: Ongoing, Progressing  Goal: Optimal Comfort and Wellbeing  Outcome: Ongoing, Progressing  Intervention: Provide Person-Centered Care  Flowsheets (Taken 7/3/2022 1802)  Trust Relationship/Rapport:   questions encouraged   reassurance provided   thoughts/feelings acknowledged     Problem: Diabetes Comorbidity  Goal: Blood Glucose Level Within Targeted Range  Outcome: Ongoing, Progressing  Intervention: Monitor and Manage Glycemia  Flowsheets (Taken 7/3/2022 1802)  Glycemic Management:   blood glucose monitored   supplemental insulin given     Problem: Skin Injury Risk Increased  Goal: Skin Health and Integrity  Outcome: Ongoing, Progressing  Intervention: Optimize Skin Protection  Flowsheets (Taken 7/3/2022 1802)  Pressure Reduction Techniques:   frequent weight shift encouraged   weight shift assistance provided  Pressure Reduction Devices: heel offloading device utilized  Skin Protection:   adhesive use limited   incontinence pads utilized  Head of Bed (HOB) Positioning: HOB at 30 degrees     Problem: Infection  Goal: Absence of Infection Signs and Symptoms  Outcome: Ongoing, Progressing     Problem: Fall Injury Risk  Goal: Absence of Fall and Fall-Related Injury  Outcome: Ongoing, Progressing  Intervention: Promote Injury-Free Environment  Flowsheets (Taken 7/3/2022 1802)  Safety Promotion/Fall Prevention:   assistive device/personal item within reach   commode/urinal/bedpan at bedside   Fall Risk signage in place   nonskid shoes/socks when out of bed     POC reviewed. AAOX4. Pt remain SB thru-out shift. Asymptomatic. Pt refused to be reposition. No complaint of discomfort/pain. Call light in reach. Bed in lowest positioned. [facility]

## 2022-07-04 LAB
ALBUMIN SERPL BCP-MCNC: 2.5 G/DL (ref 3.5–5.2)
ALP SERPL-CCNC: 64 U/L (ref 55–135)
ALT SERPL W/O P-5'-P-CCNC: 15 U/L (ref 10–44)
ANION GAP SERPL CALC-SCNC: 8 MMOL/L (ref 8–16)
AST SERPL-CCNC: 18 U/L (ref 10–40)
BASOPHILS # BLD AUTO: 0.05 K/UL (ref 0–0.2)
BASOPHILS NFR BLD: 0.6 % (ref 0–1.9)
BILIRUB SERPL-MCNC: 0.3 MG/DL (ref 0.1–1)
BUN SERPL-MCNC: 30 MG/DL (ref 8–23)
CALCIUM SERPL-MCNC: 8.8 MG/DL (ref 8.7–10.5)
CHLORIDE SERPL-SCNC: 106 MMOL/L (ref 95–110)
CK SERPL-CCNC: 187 U/L (ref 20–200)
CO2 SERPL-SCNC: 22 MMOL/L (ref 23–29)
CREAT SERPL-MCNC: 1.5 MG/DL (ref 0.5–1.4)
CRP SERPL-MCNC: 14.5 MG/L (ref 0–8.2)
DIFFERENTIAL METHOD: ABNORMAL
EOSINOPHIL # BLD AUTO: 0.8 K/UL (ref 0–0.5)
EOSINOPHIL NFR BLD: 10.2 % (ref 0–8)
ERYTHROCYTE [DISTWIDTH] IN BLOOD BY AUTOMATED COUNT: 17 % (ref 11.5–14.5)
EST. GFR  (AFRICAN AMERICAN): 52.6 ML/MIN/1.73 M^2
EST. GFR  (NON AFRICAN AMERICAN): 45.5 ML/MIN/1.73 M^2
GLUCOSE SERPL-MCNC: 261 MG/DL (ref 70–110)
HCT VFR BLD AUTO: 32.7 % (ref 40–54)
HGB BLD-MCNC: 9.9 G/DL (ref 14–18)
IMM GRANULOCYTES # BLD AUTO: 0.01 K/UL (ref 0–0.04)
IMM GRANULOCYTES NFR BLD AUTO: 0.1 % (ref 0–0.5)
LYMPHOCYTES # BLD AUTO: 1.4 K/UL (ref 1–4.8)
LYMPHOCYTES NFR BLD: 16.3 % (ref 18–48)
MAGNESIUM SERPL-MCNC: 1.8 MG/DL (ref 1.6–2.6)
MCH RBC QN AUTO: 25.4 PG (ref 27–31)
MCHC RBC AUTO-ENTMCNC: 30.3 G/DL (ref 32–36)
MCV RBC AUTO: 84 FL (ref 82–98)
MONOCYTES # BLD AUTO: 0.7 K/UL (ref 0.3–1)
MONOCYTES NFR BLD: 8.7 % (ref 4–15)
NEUTROPHILS # BLD AUTO: 5.3 K/UL (ref 1.8–7.7)
NEUTROPHILS NFR BLD: 64.1 % (ref 38–73)
NRBC BLD-RTO: 0 /100 WBC
PHOSPHATE SERPL-MCNC: 3.5 MG/DL (ref 2.7–4.5)
PLATELET # BLD AUTO: 364 K/UL (ref 150–450)
PMV BLD AUTO: 10 FL (ref 9.2–12.9)
POCT GLUCOSE: 145 MG/DL (ref 70–110)
POCT GLUCOSE: 192 MG/DL (ref 70–110)
POCT GLUCOSE: 259 MG/DL (ref 70–110)
POCT GLUCOSE: 260 MG/DL (ref 70–110)
POTASSIUM SERPL-SCNC: 4.2 MMOL/L (ref 3.5–5.1)
PROT SERPL-MCNC: 7 G/DL (ref 6–8.4)
RBC # BLD AUTO: 3.9 M/UL (ref 4.6–6.2)
SODIUM SERPL-SCNC: 136 MMOL/L (ref 136–145)
WBC # BLD AUTO: 8.27 K/UL (ref 3.9–12.7)

## 2022-07-04 PROCEDURE — 82550 ASSAY OF CK (CPK): CPT | Performed by: INTERNAL MEDICINE

## 2022-07-04 PROCEDURE — 84100 ASSAY OF PHOSPHORUS: CPT | Performed by: INTERNAL MEDICINE

## 2022-07-04 PROCEDURE — 94761 N-INVAS EAR/PLS OXIMETRY MLT: CPT

## 2022-07-04 PROCEDURE — 85025 COMPLETE CBC W/AUTO DIFF WBC: CPT | Performed by: INTERNAL MEDICINE

## 2022-07-04 PROCEDURE — 63600175 PHARM REV CODE 636 W HCPCS: Performed by: PHYSICIAN ASSISTANT

## 2022-07-04 PROCEDURE — 99232 SBSQ HOSP IP/OBS MODERATE 35: CPT | Mod: 95,,, | Performed by: INTERNAL MEDICINE

## 2022-07-04 PROCEDURE — 63600175 PHARM REV CODE 636 W HCPCS: Performed by: INTERNAL MEDICINE

## 2022-07-04 PROCEDURE — 20600001 HC STEP DOWN PRIVATE ROOM

## 2022-07-04 PROCEDURE — 25000003 PHARM REV CODE 250: Performed by: INTERNAL MEDICINE

## 2022-07-04 PROCEDURE — 83735 ASSAY OF MAGNESIUM: CPT | Performed by: INTERNAL MEDICINE

## 2022-07-04 PROCEDURE — 86140 C-REACTIVE PROTEIN: CPT | Performed by: INTERNAL MEDICINE

## 2022-07-04 PROCEDURE — 25000003 PHARM REV CODE 250: Performed by: NURSE PRACTITIONER

## 2022-07-04 PROCEDURE — A4216 STERILE WATER/SALINE, 10 ML: HCPCS | Performed by: INTERNAL MEDICINE

## 2022-07-04 PROCEDURE — 25000003 PHARM REV CODE 250: Performed by: PHYSICIAN ASSISTANT

## 2022-07-04 PROCEDURE — 25000003 PHARM REV CODE 250: Performed by: HOSPITALIST

## 2022-07-04 PROCEDURE — 99232 PR SUBSEQUENT HOSPITAL CARE,LEVL II: ICD-10-PCS | Mod: 95,,, | Performed by: INTERNAL MEDICINE

## 2022-07-04 PROCEDURE — 36415 COLL VENOUS BLD VENIPUNCTURE: CPT | Performed by: INTERNAL MEDICINE

## 2022-07-04 PROCEDURE — 27000207 HC ISOLATION

## 2022-07-04 PROCEDURE — 80053 COMPREHEN METABOLIC PANEL: CPT | Performed by: INTERNAL MEDICINE

## 2022-07-04 RX ADMIN — INSULIN DETEMIR 22 UNITS: 100 INJECTION, SOLUTION SUBCUTANEOUS at 10:07

## 2022-07-04 RX ADMIN — ATORVASTATIN CALCIUM 80 MG: 20 TABLET, FILM COATED ORAL at 08:07

## 2022-07-04 RX ADMIN — NIFEDIPINE 90 MG: 30 TABLET, FILM COATED, EXTENDED RELEASE ORAL at 08:07

## 2022-07-04 RX ADMIN — GABAPENTIN 200 MG: 100 CAPSULE ORAL at 08:07

## 2022-07-04 RX ADMIN — METRONIDAZOLE 500 MG: 500 TABLET ORAL at 07:07

## 2022-07-04 RX ADMIN — DAPTOMYCIN 995 MG: 350 INJECTION, POWDER, LYOPHILIZED, FOR SOLUTION INTRAVENOUS at 05:07

## 2022-07-04 RX ADMIN — TAMSULOSIN HYDROCHLORIDE 0.4 MG: 0.4 CAPSULE ORAL at 08:07

## 2022-07-04 RX ADMIN — INSULIN ASPART 20 UNITS: 100 INJECTION, SOLUTION INTRAVENOUS; SUBCUTANEOUS at 05:07

## 2022-07-04 RX ADMIN — INSULIN ASPART 20 UNITS: 100 INJECTION, SOLUTION INTRAVENOUS; SUBCUTANEOUS at 07:07

## 2022-07-04 RX ADMIN — HYDRALAZINE HYDROCHLORIDE 25 MG: 25 TABLET, FILM COATED ORAL at 10:07

## 2022-07-04 RX ADMIN — ISOSORBIDE DINITRATE 10 MG: 10 TABLET ORAL at 02:07

## 2022-07-04 RX ADMIN — ASPIRIN 81 MG: 81 TABLET, COATED ORAL at 08:07

## 2022-07-04 RX ADMIN — ISOSORBIDE DINITRATE 10 MG: 10 TABLET ORAL at 08:07

## 2022-07-04 RX ADMIN — OXYCODONE HYDROCHLORIDE AND ACETAMINOPHEN 1 TABLET: 5; 325 TABLET ORAL at 10:07

## 2022-07-04 RX ADMIN — ISOSORBIDE DINITRATE 10 MG: 10 TABLET ORAL at 10:07

## 2022-07-04 RX ADMIN — INSULIN ASPART 20 UNITS: 100 INJECTION, SOLUTION INTRAVENOUS; SUBCUTANEOUS at 11:07

## 2022-07-04 RX ADMIN — HYDRALAZINE HYDROCHLORIDE 25 MG: 25 TABLET, FILM COATED ORAL at 02:07

## 2022-07-04 RX ADMIN — Medication 10 ML: at 12:07

## 2022-07-04 RX ADMIN — INSULIN DETEMIR 22 UNITS: 100 INJECTION, SOLUTION SUBCUTANEOUS at 08:07

## 2022-07-04 RX ADMIN — GABAPENTIN 200 MG: 100 CAPSULE ORAL at 10:07

## 2022-07-04 RX ADMIN — Medication 10 ML: at 06:07

## 2022-07-04 RX ADMIN — Medication 1 CAPSULE: at 08:07

## 2022-07-04 RX ADMIN — Medication 1 CAPSULE: at 10:07

## 2022-07-04 RX ADMIN — HYDRALAZINE HYDROCHLORIDE 25 MG: 25 TABLET, FILM COATED ORAL at 08:07

## 2022-07-04 NOTE — PROGRESS NOTES
[facility] - Telemetry [facility] Medicine  Telemedicine Progress Note    Patient Name: Saji Castañeda  MRN: [id number]  Patient Class: IP- Inpatient   Admission Date: 6/15/2022  Length of Stay: 18 days  Attending Physician: Keerthi Mayorga MD  Primary Care Provider: Mart Escalante MD          Subjective:     Principal Problem:Osteomyelitis of left lower extremity        HPI:  71 y.o. M with PMHx DM2 and PVD with chronic diabetic foot wounds s/p L sided BKA, HTN, asymptomatic bradycardia, CKD 3 and HLD who presented to the ED for worsening R foot pain and drainage. Pt. With known chronic ulceration/osteomylitis to L heel and ID has recommended BKA for definitive therapy, but the patient reports that he has not been interested in amputation. Today, he had his usual f/u appointment with ID, and the patient was referred to the ED for imaging with bone biopsy and potential abx because of worsening drainage and pain. Pt. Denies any current fevers, chills, nausea, or other systemic signs of infection.      Overview/Hospital Course:  Patient admitted to hospital medicine. Podiatry and vascular surgery consulted. Patient underwent bone biopsy and culture. Infectious disease consulted.         This encounter was provided through telemedicine.  Patient was transferred to the telemedicine service on:  06/18/2022   The patient location [facility] admitted 6/15/2022  2:10 PM.  Present with the patient at the time of the telemed/virtual assessment: Telepresenter    Interval History/Overnight Events:   Again bradycardic to 30's and asymptomatic - only 1st degree block on previous ECG; no foot pain; slept well; denies hx of snoring or sleep apnea    -he was accepted by a SNF but subsequently declined when they were unable to provide antibiotic, Daptomycin; now pursuing LTAC    Review of Systems   Constitutional:  Positive for activity change.   Respiratory:  Negative for cough and shortness of breath.     Cardiovascular:  Negative for chest pain.   Gastrointestinal:  Negative for diarrhea and vomiting.   Musculoskeletal:  Negative for arthralgias and myalgias.      Inpatient Medications:  Scheduled Meds:   aspirin  81 mg Oral Daily    atorvastatin  80 mg Oral Daily    DAPTOmycin (CUBICIN)  IV  8 mg/kg Intravenous Q24H    gabapentin  200 mg Oral BID    hydrALAZINE  25 mg Oral TID    insulin aspart U-100  20 Units Subcutaneous TIDWM    insulin detemir U-100  22 Units Subcutaneous BID    isosorbide dinitrate  10 mg Oral TID    Lactobacillus rhamnosus GG  1 capsule Oral BID    metroNIDAZOLE  500 mg Oral Q8H    NIFEdipine  90 mg Oral Daily    sodium chloride 0.9%  10 mL Intravenous Q6H    tamsulosin  0.4 mg Oral Daily     Continuous Infusions:  PRN Meds:.acetaminophen, albuterol-ipratropium, aluminum-magnesium hydroxide-simethicone, dextrose 10%, dextrose 10%, glucagon (human recombinant), glucose, glucose, hydrALAZINE, insulin aspart U-100, melatonin, naloxone, ondansetron, oxyCODONE-acetaminophen, prochlorperazine, sars-cov-2 (covid-19), sodium chloride 0.9%, Flushing PICC Protocol **AND** sodium chloride 0.9% **AND** sodium chloride 0.9%      Objective:     Temp:  [97.5 °F (36.4 °C)-98.6 °F (37 °C)] 98.1 °F (36.7 °C)  Pulse:  [39-60] 44  Resp:  [16-21] 18  SpO2:  [92 %-98 %] 92 %  BP: (120-164)/(57-71) 144/63      Intake/Output Summary (Last 24 hours) at 7/3/2022 0917  Last data filed at 7/3/2022 0736  Gross per 24 hour   Intake 600 ml   Output 1000 ml   Net -400 ml          Body mass index is 40.47 kg/m².    Physical Exam  Vitals and nursing note reviewed.   Constitutional:       General: He is not in acute distress.     Appearance: Normal appearance. He is normal weight. He is not ill-appearing.   HENT:      Head: Normocephalic and atraumatic.      Right Ear: Hearing normal.      Left Ear: Hearing normal.      Nose: Nose normal.   Eyes:      General: No scleral icterus.        Right eye: No discharge.          Left eye: No discharge.      Extraocular Movements: Extraocular movements intact.   Cardiovascular:      Rate and Rhythm: Normal rate.   Pulmonary:      Effort: Pulmonary effort is normal. No accessory muscle usage or respiratory distress.   Musculoskeletal:      Right lower leg: Edema present.      Left lower leg: Edema present.      Comments: Left foot with dressing; patient notes edema improved from admit   Neurological:      General: No focal deficit present.      Mental Status: He is alert and oriented to person, place, and time.      Cranial Nerves: No cranial nerve deficit.      Motor: No weakness.   Psychiatric:         Attention and Perception: Attention normal.         Mood and Affect: Mood normal.         Speech: Speech normal.         Behavior: Behavior is cooperative.        Labs:  Recent Results (from the past 24 hour(s))   POCT glucose    Collection Time: 07/02/22 11:07 AM   Result Value Ref Range    POCT Glucose 227 (H) 70 - 110 mg/dL   Basic metabolic panel    Collection Time: 07/02/22 11:33 AM   Result Value Ref Range    Sodium 136 136 - 145 mmol/L    Potassium 4.0 3.5 - 5.1 mmol/L    Chloride 105 95 - 110 mmol/L    CO2 25 23 - 29 mmol/L    Glucose 211 (H) 70 - 110 mg/dL    BUN 27 (H) 8 - 23 mg/dL    Creatinine 1.4 0.5 - 1.4 mg/dL    Calcium 9.1 8.7 - 10.5 mg/dL    Anion Gap 6 (L) 8 - 16 mmol/L    eGFR if African American 57.2 (A) >60 mL/min/1.73 m^2    eGFR if non African American 49.5 (A) >60 mL/min/1.73 m^2   Phosphorus    Collection Time: 07/02/22 11:33 AM   Result Value Ref Range    Phosphorus 3.2 2.7 - 4.5 mg/dL   Magnesium    Collection Time: 07/02/22 11:33 AM   Result Value Ref Range    Magnesium 1.9 1.6 - 2.6 mg/dL   POCT glucose    Collection Time: 07/02/22  3:30 PM   Result Value Ref Range    POCT Glucose 168 (H) 70 - 110 mg/dL   POCT glucose    Collection Time: 07/02/22  9:28 PM   Result Value Ref Range    POCT Glucose 127 (H) 70 - 110 mg/dL   POCT glucose    Collection Time:  07/03/22  7:33 AM   Result Value Ref Range    POCT Glucose 155 (H) 70 - 110 mg/dL        Lab Results   Component Value Date    [id number] Negative 06/15/2022       Recent Labs   Lab 06/28/22 0621 06/30/22 0541 07/02/22 0318   WBC 8.71 7.83 8.10   LYMPH 17.6*  1.5 20.1  1.6 21.7  1.8   HGB 10.8* 10.5* 10.0*   HCT 34.0* 32.8* 32.3*    375 372       Recent Labs   Lab 06/27/22 0520 06/28/22 0621 07/01/22 0441 07/02/22 0318 07/02/22  1133      < > 136 137 136   K 4.2   < > 4.4 5.4* 4.0      < > 104 107 105   CO2 24   < > 24 20* 25   BUN 23   < > 27* 28* 27*   CREATININE 1.4   < > 1.4 1.4 1.4   *   < > 165* 160* 211*   CALCIUM 8.7   < > 9.5 8.5* 9.1   MG 1.7  --   --   --  1.9   PHOS  --   --  4.0  --  3.2    < > = values in this interval not displayed.       Recent Labs   Lab 06/28/22 0621 06/30/22 0541 07/01/22 0441 07/02/22 0318   ALKPHOS 66 69  --  65   ALT 14 13  --  13   AST 17 15  --  18   ALBUMIN 2.6* 2.7* 2.7* 2.6*   PROT 6.6 6.7  --  6.6   BILITOT 0.3 0.2  --  0.3          No results for input(s): DDIMER, FERRITIN, CRP, LDH, BNP, TROPONINI, CPK in the last 72 hours.    Invalid input(s): PROCALCITONIN      All labs within the last 24 hours were reviewed.     Microbiology:  Microbiology Results (last 7 days)       Procedure Component Value Units Date/Time    Clostridium difficile EIA [[id number]]     Order Status: Canceled Specimen: Stool     Stool culture [[id number]]     Order Status: Canceled Specimen: Stool               Imaging  ECG Results    None         No results found for this or any previous visit.      X-Ray Chest 1 View S/P PICC Line by Nursing  Narrative: EXAMINATION:  XR CHEST 1 VIEW S/P PICC LINE BY NURSING    CLINICAL HISTORY:  s/p picc placement;    TECHNIQUE:  Single frontal view of chest.    COMPARISON:  March 3, 2021    FINDINGS:  Right PICC line now evident, tip superimposing lower superior vena caval soft tissues.  Normal heart size.  Normal pulmonary  vasculature.  No focal infiltrates.  No pleural fluid or pneumothorax.  No acute bony findings.  Impression: 1. Right PICC line tip superimposes lower superior vena caval soft tissues.  2. No active cardiac or pulmonary findings    Electronically signed by: John Thomas  Date:    06/24/2022  Time:    12:31      All imaging within the last 24 hours was reviewed.       Discharge Planning   OXANA: 7/5/2022     Code Status: Full Code   Is the patient medically ready for discharge?: Yes    Reason for patient still in hospital (select all that apply): Patient trending condition, Treatment, and Pending disposition  Discharge Plan A: Skilled Nursing Facility   Discharge Delays: None known at this time          Assessment/Plan:      * Osteomyelitis of left lower extremity  -With acute on chronic osteomyleitis of LLE including heel with purulent foul smelling drainage  -Podiatry consulted, Not septic appearing. S/p bone biopsy.  -ID consulted. Cultures growing Staph aureus and GBS   - L foot wound anaerobic culture growing Prevotella and Porphyromonas Somerae. PO Flagyl added.    - L foot bone culture growing Staph species, continued IV ceftriaxone and vancomycin (per Pharmacy dosing).  - with acute renal insufficiency, vancomycin changed to daptomycin and ceftriaxone discontinued as not needed based on current cultures. Continuing Flagyl  -Continuing daptomycin and Flagyl; Estimated end date of daptomycin: 7/28/22. Estimated end date of Flagyl: 7/4/22    Acute renal insufficiency  Estimated Creatinine Clearance: 61.2 mL/min (based on SCr of 1.4 mg/dL).  Per med history, patient no longer taking ARB.  sCr up slightly and vanco level > 20.  Discontinued ARB, holding diuretic for now. Trial of gentle hydration ineffective.  Discontinued vancomycin.  -variable sCr     Peripheral arterial disease  -PVD contributing to current non-healing foot wound. Arterial US shows LLE atherosclerotic disease, areas of high-grade stenosis within  the L popliteal artery  -Continue home statin, ASA  -Vascular Surgery consulted - patient considering surgery (BKA); seems reluctant primarily due to the time he expects it to take to obtain a prosthetic limb. Wants to try conservative management with antibiotics.  -Plan for SNF for IV antibiotics, PT/OT  - Follow up with Podiatry, ID and Vascular Surgery    Asymptomatic Mobitz (type) I (Wenckebach's) atrioventricular block  -Bradycardic in ED but asymptomatic, chronic issue  -Repeat EKG 6/19:  BPM 44 Normal sinus rhythm - AV block, 2:1 - Nonspecific T wave abnormality   -avoid BB and chronotropic CCB  -cardiology reviewed with no intervention recommended; will monitor electrolytes    Chronic kidney disease, stage III (moderate)  -sCr baseline 1.1  -Continue to monitor  -sCr remained > baseline; near to baseline 6/26  -sCr 1.4 on 6/29; HCTZ was resumed on 6/27 for hypertension but stopped on 6/30 when Cr elevated    Type 2 diabetes, uncontrolled, with neuropathy  -Last A1c 2020, repeat > 9%. Per patient, taking 68 units Lantus QHS along with metformin  mg and glipizide 10 mg daily (Possibly, he is not sure)  -Per Pharm med history: patient is no longer taking insulin  -Mildly hypoglycemic on admit with BG 63  -decreased dose of basal insulin to 15 units Levemir, Titrate up as needed. Added prandial insulin 6/19  -worsening hyperglycemia as oral agents wash out. Levemir increased to BID and aspart increased with meals.  - doses adjusted 6/21; increased for 6/23, and again for 6/25  - some hyperglycemia due to dietary indiscretion; decreased basal insulin 6/27  - BGs controlled when patient adheres to diet (no regular sodas)    Essential hypertension  Continued losartan 100mg and HCTZ 25 mg, with hydralazine 25 mg PO q8h PRN  Added nifedipine 6/18  Consulted PharmD for med history: patient reports no longer taking isosorbide-hydralazine, losartan, and losartan-HCTZ  Discontinued ARB, held HCTZ due to increasing  sCr. Continued nifedipine for BP control.  Resumed diuretic when renal insufficiency improved; but did not tolerate so stopped  Hydralazine dose changed multiple times; isordil added to regimen on 7/2      VTE Risk Mitigation (From admission, onward)         Ordered     IP VTE HIGH RISK PATIENT  Once         06/15/22 1957     Place sequential compression device  Until discontinued         06/15/22 1957                I have assessed these findings virtually using a telemed platform and with assistance of the bedside nurse.        The attending portion of this evaluation, treatment, and documentation was performed per Keerthi Mayorga MD via Telemedicine AudioVisual using the secure [employer] software platform with 2 way audio/video. The provider was located off-site and the patient is located in the hospital. The aforementioned video software was utilized to document the relevant history and physical exam    Keerthi Mayorga MD  Department of Hospital Medicine   [facility] - Telemetry Stepdown

## 2022-07-04 NOTE — PLAN OF CARE
Patient refused to go to US for edema on RUE. Discussed with patient the importance of ruling out DVT. Patient continued to refused, physician notified.

## 2022-07-04 NOTE — SUBJECTIVE & OBJECTIVE
This encounter was provided through telemedicine.  Patient was transferred to the telemedicine service on:  06/18/2022   The patient location is: [id number] A admitted 6/15/2022  2:10 PM.  Present with the patient at the time of the telemed/virtual assessment: Telepresenter    Interval History/Overnight Events:     Right UE edema unchanged - nursing to assist with elevating arm; patient refused US last pm but understands the need to evaluate right UE; continues with asymptomatic nocturnal bradycardia; no recurrence of foot pain    -he was accepted by a SNF but subsequently declined when they were unable to provide antibiotic, Daptomycin; now pursuing LTAC    Review of Systems   Constitutional:  Positive for activity change.   Respiratory:  Negative for cough and shortness of breath.    Cardiovascular:  Negative for chest pain.   Gastrointestinal:  Negative for diarrhea and vomiting.   Musculoskeletal:  Negative for arthralgias and myalgias.      Inpatient Medications:  Scheduled Meds:   aspirin  81 mg Oral Daily    atorvastatin  80 mg Oral Daily    DAPTOmycin (CUBICIN)  IV  8 mg/kg Intravenous Q24H    gabapentin  200 mg Oral BID    hydrALAZINE  25 mg Oral TID    insulin aspart U-100  20 Units Subcutaneous TIDWM    insulin detemir U-100  22 Units Subcutaneous BID    isosorbide dinitrate  10 mg Oral TID    Lactobacillus rhamnosus GG  1 capsule Oral BID    NIFEdipine  90 mg Oral Daily    sodium chloride 0.9%  10 mL Intravenous Q6H    tamsulosin  0.4 mg Oral Daily     Continuous Infusions:  PRN Meds:.acetaminophen, albuterol-ipratropium, aluminum-magnesium hydroxide-simethicone, dextrose 10%, dextrose 10%, glucagon (human recombinant), glucose, glucose, hydrALAZINE, insulin aspart U-100, melatonin, naloxone, ondansetron, oxyCODONE-acetaminophen, prochlorperazine, sars-cov-2 (covid-19), sodium chloride 0.9%, Flushing PICC Protocol **AND** sodium chloride 0.9% **AND** sodium chloride 0.9%      Objective:     Temp:  [97.6  °F (36.4 °C)-98.2 °F (36.8 °C)] 98.2 °F (36.8 °C)  Pulse:  [44-86] 44  Resp:  [16-20] 16  SpO2:  [93 %-96 %] 95 %  BP: (137-187)/(62-75) 152/69      Intake/Output Summary (Last 24 hours) at 7/4/2022 1147  Last data filed at 7/3/2022 1734  Gross per 24 hour   Intake 480 ml   Output 600 ml   Net -120 ml          Body mass index is 40.47 kg/m².    Physical Exam  Vitals and nursing note reviewed.   Constitutional:       General: He is not in acute distress.     Appearance: Normal appearance. He is normal weight. He is not ill-appearing.   HENT:      Head: Normocephalic and atraumatic.      Right Ear: Hearing normal.      Left Ear: Hearing normal.      Nose: Nose normal.   Eyes:      General: No scleral icterus.        Right eye: No discharge.         Left eye: No discharge.      Extraocular Movements: Extraocular movements intact.   Cardiovascular:      Rate and Rhythm: Normal rate.   Pulmonary:      Effort: Pulmonary effort is normal. No accessory muscle usage or respiratory distress.   Musculoskeletal:      Right lower leg: Edema present.      Left lower leg: Edema present.      Comments: Left foot with dressing; patient notes edema improved from admit   Neurological:      General: No focal deficit present.      Mental Status: He is alert and oriented to person, place, and time.      Cranial Nerves: No cranial nerve deficit.      Motor: No weakness.   Psychiatric:         Attention and Perception: Attention normal.         Mood and Affect: Mood normal.         Speech: Speech normal.         Behavior: Behavior is cooperative.        Labs:  Recent Results (from the past 24 hour(s))   POCT glucose    Collection Time: 07/03/22  3:37 PM   Result Value Ref Range    POCT Glucose 91 70 - 110 mg/dL   POCT glucose    Collection Time: 07/03/22  3:59 PM   Result Value Ref Range    POCT Glucose 102 70 - 110 mg/dL   POCT glucose    Collection Time: 07/03/22 10:35 PM   Result Value Ref Range    POCT Glucose 145 (H) 70 - 110 mg/dL    CBC with Automated Differential    Collection Time: 07/04/22  5:10 AM   Result Value Ref Range    WBC 8.27 3.90 - 12.70 K/uL    RBC 3.90 (L) 4.60 - 6.20 M/uL    Hemoglobin 9.9 (L) 14.0 - 18.0 g/dL    Hematocrit 32.7 (L) 40.0 - 54.0 %    MCV 84 82 - 98 fL    MCH 25.4 (L) 27.0 - 31.0 pg    MCHC 30.3 (L) 32.0 - 36.0 g/dL    RDW 17.0 (H) 11.5 - 14.5 %    Platelets 364 150 - 450 K/uL    MPV 10.0 9.2 - 12.9 fL    Immature Granulocytes 0.1 0.0 - 0.5 %    Gran # (ANC) 5.3 1.8 - 7.7 K/uL    Immature Grans (Abs) 0.01 0.00 - 0.04 K/uL    Lymph # 1.4 1.0 - 4.8 K/uL    Mono # 0.7 0.3 - 1.0 K/uL    Eos # 0.8 (H) 0.0 - 0.5 K/uL    Baso # 0.05 0.00 - 0.20 K/uL    nRBC 0 0 /100 WBC    Gran % 64.1 38.0 - 73.0 %    Lymph % 16.3 (L) 18.0 - 48.0 %    Mono % 8.7 4.0 - 15.0 %    Eosinophil % 10.2 (H) 0.0 - 8.0 %    Basophil % 0.6 0.0 - 1.9 %    Differential Method Automated    CK    Collection Time: 07/04/22  5:10 AM   Result Value Ref Range     20 - 200 U/L   Comprehensive Metabolic Panel (CMP)    Collection Time: 07/04/22  5:10 AM   Result Value Ref Range    Sodium 136 136 - 145 mmol/L    Potassium 4.2 3.5 - 5.1 mmol/L    Chloride 106 95 - 110 mmol/L    CO2 22 (L) 23 - 29 mmol/L    Glucose 261 (H) 70 - 110 mg/dL    BUN 30 (H) 8 - 23 mg/dL    Creatinine 1.5 (H) 0.5 - 1.4 mg/dL    Calcium 8.8 8.7 - 10.5 mg/dL    Total Protein 7.0 6.0 - 8.4 g/dL    Albumin 2.5 (L) 3.5 - 5.2 g/dL    Total Bilirubin 0.3 0.1 - 1.0 mg/dL    Alkaline Phosphatase 64 55 - 135 U/L    AST 18 10 - 40 U/L    ALT 15 10 - 44 U/L    Anion Gap 8 8 - 16 mmol/L    eGFR if African American 52.6 (A) >60 mL/min/1.73 m^2    eGFR if non African American 45.5 (A) >60 mL/min/1.73 m^2   C-Reactive Protein    Collection Time: 07/04/22  5:10 AM   Result Value Ref Range    CRP 14.5 (H) 0.0 - 8.2 mg/L   Magnesium    Collection Time: 07/04/22  5:10 AM   Result Value Ref Range    Magnesium 1.8 1.6 - 2.6 mg/dL   Phosphorus    Collection Time: 07/04/22  5:10 AM   Result Value Ref  Range    Phosphorus 3.5 2.7 - 4.5 mg/dL   POCT glucose    Collection Time: 07/04/22  7:28 AM   Result Value Ref Range    POCT Glucose 259 (H) 70 - 110 mg/dL   POCT glucose    Collection Time: 07/04/22 11:02 AM   Result Value Ref Range    POCT Glucose 260 (H) 70 - 110 mg/dL        Lab Results   Component Value Date    [id number] Negative 06/15/2022       Recent Labs   Lab 06/30/22  0541 07/02/22  0318 07/04/22  0510   WBC 7.83 8.10 8.27   LYMPH 20.1  1.6 21.7  1.8 16.3*  1.4   HGB 10.5* 10.0* 9.9*   HCT 32.8* 32.3* 32.7*    372 364       Recent Labs   Lab 07/01/22  0441 07/02/22 0318 07/02/22  1133 07/04/22  0510    137 136 136   K 4.4 5.4* 4.0 4.2    107 105 106   CO2 24 20* 25 22*   BUN 27* 28* 27* 30*   CREATININE 1.4 1.4 1.4 1.5*   * 160* 211* 261*   CALCIUM 9.5 8.5* 9.1 8.8   MG  --   --  1.9 1.8   PHOS 4.0  --  3.2 3.5       Recent Labs   Lab 06/30/22  0541 07/01/22 0441 07/02/22 0318 07/04/22  0510   ALKPHOS 69  --  65 64   ALT 13  --  13 15   AST 15  --  18 18   ALBUMIN 2.7* 2.7* 2.6* 2.5*   PROT 6.7  --  6.6 7.0   BILITOT 0.2  --  0.3 0.3          Recent Labs     07/04/22  0510   CRP 14.5*            All labs within the last 24 hours were reviewed.     Microbiology:  Microbiology Results (last 7 days)       ** No results found for the last 168 hours. **              Imaging  ECG Results    None         No results found for this or any previous visit.      X-Ray Chest 1 View S/P PICC Line by Nursing  Narrative: EXAMINATION:  XR CHEST 1 VIEW S/P PICC LINE BY NURSING    CLINICAL HISTORY:  s/p picc placement;    TECHNIQUE:  Single frontal view of chest.    COMPARISON:  March 3, 2021    FINDINGS:  Right PICC line now evident, tip superimposing lower superior vena caval soft tissues.  Normal heart size.  Normal pulmonary vasculature.  No focal infiltrates.  No pleural fluid or pneumothorax.  No acute bony findings.  Impression: 1. Right PICC line tip superimposes lower  superior vena caval soft tissues.  2. No active cardiac or pulmonary findings    Electronically signed by: John Thomas  Date:    06/24/2022  Time:    12:31      All imaging within the last 24 hours was reviewed.       Discharge Planning   OXANA: 7/5/2022     Code Status: Full Code   Is the patient medically ready for discharge?: Yes    Reason for patient still in hospital (select all that apply): Patient trending condition, Treatment, and Pending disposition  Discharge Plan A: LTAC  Discharge Delays: no accepting SNF

## 2022-07-04 NOTE — ASSESSMENT & PLAN NOTE
Continued losartan 100mg and HCTZ 25 mg, with hydralazine 25 mg PO q8h PRN  Added nifedipine 6/18  Consulted PharmD for med history: patient reports no longer taking isosorbide-hydralazine, losartan, and losartan-HCTZ  Discontinued ARB, held HCTZ due to increasing sCr. Continued nifedipine for BP control.  Resumed diuretic when renal insufficiency improved; but did not tolerate so stopped  Hydralazine dose changed multiple times; isordil added to regimen on 7/2

## 2022-07-05 LAB
POCT GLUCOSE: 116 MG/DL (ref 70–110)
POCT GLUCOSE: 142 MG/DL (ref 70–110)
POCT GLUCOSE: 181 MG/DL (ref 70–110)
POCT GLUCOSE: 204 MG/DL (ref 70–110)
POCT GLUCOSE: 251 MG/DL (ref 70–110)

## 2022-07-05 PROCEDURE — 20600001 HC STEP DOWN PRIVATE ROOM

## 2022-07-05 PROCEDURE — A4216 STERILE WATER/SALINE, 10 ML: HCPCS | Performed by: INTERNAL MEDICINE

## 2022-07-05 PROCEDURE — 25000003 PHARM REV CODE 250: Performed by: INTERNAL MEDICINE

## 2022-07-05 PROCEDURE — 99232 SBSQ HOSP IP/OBS MODERATE 35: CPT | Mod: 95,,, | Performed by: INTERNAL MEDICINE

## 2022-07-05 PROCEDURE — 25000003 PHARM REV CODE 250: Performed by: HOSPITALIST

## 2022-07-05 PROCEDURE — 99232 PR SUBSEQUENT HOSPITAL CARE,LEVL II: ICD-10-PCS | Mod: 95,,, | Performed by: INTERNAL MEDICINE

## 2022-07-05 PROCEDURE — 27000207 HC ISOLATION

## 2022-07-05 RX ADMIN — HYDRALAZINE HYDROCHLORIDE 25 MG: 25 TABLET, FILM COATED ORAL at 09:07

## 2022-07-05 RX ADMIN — Medication 1 CAPSULE: at 09:07

## 2022-07-05 RX ADMIN — Medication 10 ML: at 05:07

## 2022-07-05 RX ADMIN — ISOSORBIDE DINITRATE 10 MG: 10 TABLET ORAL at 03:07

## 2022-07-05 RX ADMIN — TAMSULOSIN HYDROCHLORIDE 0.4 MG: 0.4 CAPSULE ORAL at 09:07

## 2022-07-05 RX ADMIN — ISOSORBIDE DINITRATE 10 MG: 10 TABLET ORAL at 09:07

## 2022-07-05 RX ADMIN — HYDRALAZINE HYDROCHLORIDE 25 MG: 25 TABLET, FILM COATED ORAL at 02:07

## 2022-07-05 RX ADMIN — Medication 10 ML: at 12:07

## 2022-07-05 RX ADMIN — INSULIN ASPART 20 UNITS: 100 INJECTION, SOLUTION INTRAVENOUS; SUBCUTANEOUS at 11:07

## 2022-07-05 RX ADMIN — INSULIN ASPART 20 UNITS: 100 INJECTION, SOLUTION INTRAVENOUS; SUBCUTANEOUS at 07:07

## 2022-07-05 RX ADMIN — ASPIRIN 81 MG: 81 TABLET, COATED ORAL at 09:07

## 2022-07-05 RX ADMIN — OXYCODONE HYDROCHLORIDE AND ACETAMINOPHEN 1 TABLET: 5; 325 TABLET ORAL at 02:07

## 2022-07-05 RX ADMIN — APIXABAN 10 MG: 5 TABLET, FILM COATED ORAL at 09:07

## 2022-07-05 RX ADMIN — INSULIN ASPART 20 UNITS: 100 INJECTION, SOLUTION INTRAVENOUS; SUBCUTANEOUS at 05:07

## 2022-07-05 RX ADMIN — NIFEDIPINE 90 MG: 30 TABLET, FILM COATED, EXTENDED RELEASE ORAL at 09:07

## 2022-07-05 RX ADMIN — Medication 10 ML: at 06:07

## 2022-07-05 RX ADMIN — ATORVASTATIN CALCIUM 80 MG: 20 TABLET, FILM COATED ORAL at 09:07

## 2022-07-05 RX ADMIN — GABAPENTIN 200 MG: 100 CAPSULE ORAL at 09:07

## 2022-07-05 NOTE — PLAN OF CARE
Patient in bed. AAO x 4. ALEKS Billingsley on tele. Reports pain to LLE. Dressing C/D/I, reinforced. Refused repositioning. Educated on prevention of bed wounds. Refused ultrasound, states that he rather complete in AM. Bed locked and lowered. Call bell in reach.      Problem: Adult Inpatient Plan of Care  Goal: Plan of Care Review  Outcome: Ongoing, Progressing     Problem: Diabetes Comorbidity  Goal: Blood Glucose Level Within Targeted Range  Outcome: Ongoing, Progressing     Problem: Impaired Wound Healing  Goal: Optimal Wound Healing  Outcome: Ongoing, Progressing     Problem: Skin Injury Risk Increased  Goal: Skin Health and Integrity  Outcome: Ongoing, Progressing     Problem: Fall Injury Risk  Goal: Absence of Fall and Fall-Related Injury  Outcome: Ongoing, Progressing

## 2022-07-05 NOTE — PLAN OF CARE
Attempted to contact Seun ([phone]) at [facility] to check status of insurance auth and left voicemail requesting return call.    3:21 PM  Spoke with Seun at [facility] who reports they are still waiting on the insurance auth decision.    Toshia Dennis, QUINCY  [facility]  Ext. [phone]

## 2022-07-05 NOTE — PROGRESS NOTES
Aaron Berg - Telemetry [facility] Medicine  Telemedicine Progress Note    Patient Name: Saji Castañeda  MRN: [id number]  Patient Class: IP- Inpatient   Admission Date: 6/15/2022  Length of Stay: 19 days  Attending Physician: Keerthi Mayorga MD  Primary Care Provider: Mart Escalante MD          Subjective:     Principal Problem:Osteomyelitis of left lower extremity        HPI:  71 y.o. M with PMHx DM2 and PVD with chronic diabetic foot wounds s/p L sided BKA, HTN, asymptomatic bradycardia, CKD 3 and HLD who presented to the ED for worsening R foot pain and drainage. Pt. With known chronic ulceration/osteomylitis to L heel and ID has recommended BKA for definitive therapy, but the patient reports that he has not been interested in amputation. Today, he had his usual f/u appointment with ID, and the patient was referred to the ED for imaging with bone biopsy and potential abx because of worsening drainage and pain. Pt. Denies any current fevers, chills, nausea, or other systemic signs of infection.      Overview/Hospital Course:  Patient admitted to hospital medicine. Podiatry and vascular surgery consulted. Patient underwent bone biopsy and culture. Infectious disease consulted.         This encounter was provided through telemedicine.  Patient was transferred to the telemedicine service on:  06/18/2022   The [facility] is[facility] admitted 6/15/2022  2:10 PM.  Present with the patient at the time of the telemed/virtual assessment: Telepresenter    Interval History/Overnight Events:     Right UE edema unchanged - nursing to assist with elevating arm; patient refused US last pm but understands the need to evaluate right UE; continues with asymptomatic nocturnal bradycardia; no recurrence of foot pain    -he was accepted by a SNF but subsequently declined when they were unable to provide antibiotic, Daptomycin; now pursuing LTAC    Review of Systems   Constitutional:  Positive for activity change.    Respiratory:  Negative for cough and shortness of breath.    Cardiovascular:  Negative for chest pain.   Gastrointestinal:  Negative for diarrhea and vomiting.   Musculoskeletal:  Negative for arthralgias and myalgias.      Inpatient Medications:  Scheduled Meds:   aspirin  81 mg Oral Daily    atorvastatin  80 mg Oral Daily    DAPTOmycin (CUBICIN)  IV  8 mg/kg Intravenous Q24H    gabapentin  200 mg Oral BID    hydrALAZINE  25 mg Oral TID    insulin aspart U-100  20 Units Subcutaneous TIDWM    insulin detemir U-100  22 Units Subcutaneous BID    isosorbide dinitrate  10 mg Oral TID    Lactobacillus rhamnosus GG  1 capsule Oral BID    NIFEdipine  90 mg Oral Daily    sodium chloride 0.9%  10 mL Intravenous Q6H    tamsulosin  0.4 mg Oral Daily     Continuous Infusions:  PRN Meds:.acetaminophen, albuterol-ipratropium, aluminum-magnesium hydroxide-simethicone, dextrose 10%, dextrose 10%, glucagon (human recombinant), glucose, glucose, hydrALAZINE, insulin aspart U-100, melatonin, naloxone, ondansetron, oxyCODONE-acetaminophen, prochlorperazine, sars-cov-2 (covid-19), sodium chloride 0.9%, Flushing PICC Protocol **AND** sodium chloride 0.9% **AND** sodium chloride 0.9%      Objective:     Temp:  [97.6 °F (36.4 °C)-98.2 °F (36.8 °C)] 98.2 °F (36.8 °C)  Pulse:  [44-86] 44  Resp:  [16-20] 16  SpO2:  [93 %-96 %] 95 %  BP: (137-187)/(62-75) 152/69      Intake/Output Summary (Last 24 hours) at 7/4/2022 1147  Last data filed at 7/3/2022 1734  Gross per 24 hour   Intake 480 ml   Output 600 ml   Net -120 ml          Body mass index is 40.47 kg/m².    Physical Exam  Vitals and nursing note reviewed.   Constitutional:       General: He is not in acute distress.     Appearance: Normal appearance. He is normal weight. He is not ill-appearing.   HENT:      Head: Normocephalic and atraumatic.      Right Ear: Hearing normal.      Left Ear: Hearing normal.      Nose: Nose normal.   Eyes:      General: No scleral icterus.         Right eye: No discharge.         Left eye: No discharge.      Extraocular Movements: Extraocular movements intact.   Cardiovascular:      Rate and Rhythm: Normal rate.   Pulmonary:      Effort: Pulmonary effort is normal. No accessory muscle usage or respiratory distress.   Musculoskeletal:      Right lower leg: Edema present.      Left lower leg: Edema present.      Comments: Left foot with dressing; patient notes edema improved from admit   Neurological:      General: No focal deficit present.      Mental Status: He is alert and oriented to person, place, and time.      Cranial Nerves: No cranial nerve deficit.      Motor: No weakness.   Psychiatric:         Attention and Perception: Attention normal.         Mood and Affect: Mood normal.         Speech: Speech normal.         Behavior: Behavior is cooperative.        Labs:  Recent Results (from the past 24 hour(s))   POCT glucose    Collection Time: 07/03/22  3:37 PM   Result Value Ref Range    POCT Glucose 91 70 - 110 mg/dL   POCT glucose    Collection Time: 07/03/22  3:59 PM   Result Value Ref Range    POCT Glucose 102 70 - 110 mg/dL   POCT glucose    Collection Time: 07/03/22 10:35 PM   Result Value Ref Range    POCT Glucose 145 (H) 70 - 110 mg/dL   CBC with Automated Differential    Collection Time: 07/04/22  5:10 AM   Result Value Ref Range    WBC 8.27 3.90 - 12.70 K/uL    RBC 3.90 (L) 4.60 - 6.20 M/uL    Hemoglobin 9.9 (L) 14.0 - 18.0 g/dL    Hematocrit 32.7 (L) 40.0 - 54.0 %    MCV 84 82 - 98 fL    MCH 25.4 (L) 27.0 - 31.0 pg    MCHC 30.3 (L) 32.0 - 36.0 g/dL    RDW 17.0 (H) 11.5 - 14.5 %    Platelets 364 150 - 450 K/uL    MPV 10.0 9.2 - 12.9 fL    Immature Granulocytes 0.1 0.0 - 0.5 %    Gran # (ANC) 5.3 1.8 - 7.7 K/uL    Immature Grans (Abs) 0.01 0.00 - 0.04 K/uL    Lymph # 1.4 1.0 - 4.8 K/uL    Mono # 0.7 0.3 - 1.0 K/uL    Eos # 0.8 (H) 0.0 - 0.5 K/uL    Baso # 0.05 0.00 - 0.20 K/uL    nRBC 0 0 /100 WBC    Gran % 64.1 38.0 - 73.0 %    Lymph % 16.3 (L)  18.0 - 48.0 %    Mono % 8.7 4.0 - 15.0 %    Eosinophil % 10.2 (H) 0.0 - 8.0 %    Basophil % 0.6 0.0 - 1.9 %    Differential Method Automated    CK    Collection Time: 07/04/22  5:10 AM   Result Value Ref Range     20 - 200 U/L   Comprehensive Metabolic Panel (CMP)    Collection Time: 07/04/22  5:10 AM   Result Value Ref Range    Sodium 136 136 - 145 mmol/L    Potassium 4.2 3.5 - 5.1 mmol/L    Chloride 106 95 - 110 mmol/L    CO2 22 (L) 23 - 29 mmol/L    Glucose 261 (H) 70 - 110 mg/dL    BUN 30 (H) 8 - 23 mg/dL    Creatinine 1.5 (H) 0.5 - 1.4 mg/dL    Calcium 8.8 8.7 - 10.5 mg/dL    Total Protein 7.0 6.0 - 8.4 g/dL    Albumin 2.5 (L) 3.5 - 5.2 g/dL    Total Bilirubin 0.3 0.1 - 1.0 mg/dL    Alkaline Phosphatase 64 55 - 135 U/L    AST 18 10 - 40 U/L    ALT 15 10 - 44 U/L    Anion Gap 8 8 - 16 mmol/L    eGFR if African American 52.6 (A) >60 mL/min/1.73 m^2    eGFR if non African American 45.5 (A) >60 mL/min/1.73 m^2   C-Reactive Protein    Collection Time: 07/04/22  5:10 AM   Result Value Ref Range    CRP 14.5 (H) 0.0 - 8.2 mg/L   Magnesium    Collection Time: 07/04/22  5:10 AM   Result Value Ref Range    Magnesium 1.8 1.6 - 2.6 mg/dL   Phosphorus    Collection Time: 07/04/22  5:10 AM   Result Value Ref Range    Phosphorus 3.5 2.7 - 4.5 mg/dL   POCT glucose    Collection Time: 07/04/22  7:28 AM   Result Value Ref Range    POCT Glucose 259 (H) 70 - 110 mg/dL   POCT glucose    Collection Time: 07/04/22 11:02 AM   Result Value Ref Range    POCT Glucose 260 (H) 70 - 110 mg/dL        Lab Results   Component Value Date    [id number] Negative 06/15/2022       Recent Labs   Lab 06/30/22  0541 07/02/22  0318 07/04/22  0510   WBC 7.83 8.10 8.27   LYMPH 20.1  1.6 21.7  1.8 16.3*  1.4   HGB 10.5* 10.0* 9.9*   HCT 32.8* 32.3* 32.7*    372 364       Recent Labs   Lab 07/01/22  0441 07/02/22  0318 07/02/22  1133 07/04/22  0510    137 136 136   K 4.4 5.4* 4.0 4.2    107 105 106   CO2 24 20* 25 22*   BUN  27* 28* 27* 30*   CREATININE 1.4 1.4 1.4 1.5*   * 160* 211* 261*   CALCIUM 9.5 8.5* 9.1 8.8   MG  --   --  1.9 1.8   PHOS 4.0  --  3.2 3.5       Recent Labs   Lab 06/30/22  0541 07/01/22  0441 07/02/22  0318 07/04/22  0510   ALKPHOS 69  --  65 64   ALT 13  --  13 15   AST 15  --  18 18   ALBUMIN 2.7* 2.7* 2.6* 2.5*   PROT 6.7  --  6.6 7.0   BILITOT 0.2  --  0.3 0.3          Recent Labs     07/04/22  0510   CRP 14.5*            All labs within the last 24 hours were reviewed.     Microbiology:  Microbiology Results (last 7 days)       ** No results found for the last 168 hours. **              Imaging  ECG Results    None         No results found for this or any previous visit.      X-Ray Chest 1 View S/P PICC Line by Nursing  Narrative: EXAMINATION:  XR CHEST 1 VIEW S/P PICC LINE BY NURSING    CLINICAL HISTORY:  s/p picc placement;    TECHNIQUE:  Single frontal view of chest.    COMPARISON:  March 3, 2021    FINDINGS:  Right PICC line now evident, tip superimposing lower superior vena caval soft tissues.  Normal heart size.  Normal pulmonary vasculature.  No focal infiltrates.  No pleural fluid or pneumothorax.  No acute bony findings.  Impression: 1. Right PICC line tip superimposes lower superior vena caval soft tissues.  2. No active cardiac or pulmonary findings    Electronically signed by: John Thomas  Date:    06/24/2022  Time:    12:31      All imaging within the last 24 hours was reviewed.       Discharge Planning   OXANA: 7/5/2022     Code Status: Full Code   Is the patient medically ready for discharge?: Yes    Reason for patient still in hospital (select all that apply): Patient trending condition, Treatment, and Pending disposition  Discharge Plan A: LTAC  Discharge Delays: no accepting SNF          Assessment/Plan:      * Osteomyelitis of left lower extremity  -With acute on chronic osteomyleitis of LLE including heel with purulent foul smelling drainage  -Podiatry consulted, Not septic  appearing. S/p bone biopsy.  -ID consulted. Cultures growing Staph aureus and GBS   - L foot wound anaerobic culture growing Prevotella and Porphyromonas Somerae. PO Flagyl added.    - L foot bone culture growing Staph species, continued IV ceftriaxone and vancomycin (per Pharmacy dosing).  - with acute renal insufficiency, vancomycin changed to daptomycin and ceftriaxone discontinued as not needed based on current cultures. Continuing Flagyl  -Continuing daptomycin and Flagyl; Estimated end date of daptomycin: 7/28/22. Estimated end date of Flagyl: 7/4/22    Acute renal insufficiency  Estimated Creatinine Clearance: 57.1 mL/min (A) (based on SCr of 1.5 mg/dL (H)).  Per med history, patient no longer taking ARB.  sCr up slightly and vanco level > 20.  Discontinued ARB, holding diuretic for now. Trial of gentle hydration ineffective.  Discontinued vancomycin.  -variable sCr     Peripheral arterial disease  -PVD contributing to current non-healing foot wound. Arterial US shows LLE atherosclerotic disease, areas of high-grade stenosis within the L popliteal artery  -Continue home statin, ASA  -Vascular Surgery consulted - patient considering surgery (BKA); seems reluctant primarily due to the time he expects it to take to obtain a prosthetic limb. Wants to try conservative management with antibiotics.  -Plan for SNF for IV antibiotics, PT/OT  - Follow up with Podiatry, ID and Vascular Surgery    Asymptomatic Mobitz (type) I (Wenckebach's) atrioventricular block  -Bradycardic in ED but asymptomatic, chronic issue  -Repeat EKG 6/19:  BPM 44 Normal sinus rhythm - AV block, 2:1 - Nonspecific T wave abnormality   -avoid BB and chronotropic CCB  -cardiology reviewed with no intervention recommended; will monitor electrolytes    Chronic kidney disease, stage III (moderate)  -sCr baseline 1.1  -Continue to monitor  -sCr remained > baseline; near to baseline 6/26  -sCr 1.4 on 6/29; HCTZ was resumed on 6/27 for hypertension but  stopped on 6/30 when Cr elevated    Type 2 diabetes, uncontrolled, with neuropathy  -Last A1c 2020, repeat > 9%. Per patient, taking 68 units Lantus QHS along with metformin  mg and glipizide 10 mg daily (Possibly, he is not sure)  -Per Pharm med history: patient is no longer taking insulin  -Mildly hypoglycemic on admit with BG 63  -decreased dose of basal insulin to 15 units Levemir, Titrate up as needed. Added prandial insulin 6/19  -worsening hyperglycemia as oral agents wash out. Levemir increased to BID and aspart increased with meals.  - doses adjusted 6/21; increased for 6/23, and again for 6/25  - some hyperglycemia due to dietary indiscretion; decreased basal insulin 6/27  - BGs controlled when patient adheres to diet (no regular sodas)    Essential hypertension  Continued losartan 100mg and HCTZ 25 mg, with hydralazine 25 mg PO q8h PRN  Added nifedipine 6/18  Consulted PharmD for med history: patient reports no longer taking isosorbide-hydralazine, losartan, and losartan-HCTZ  Discontinued ARB, held HCTZ due to increasing sCr. Continued nifedipine for BP control.  Resumed diuretic when renal insufficiency improved; but did not tolerate so stopped  Hydralazine dose changed multiple times; isordil added to regimen on 7/2      VTE Risk Mitigation (From admission, onward)         Ordered     IP VTE HIGH RISK PATIENT  Once         06/15/22 1957     Place sequential compression device  Until discontinued         06/15/22 1957              I have assessed these findings virtually using a telemed platform and with assistance of the bedside nurse.      The attending portion of this evaluation, treatment, and documentation was performed per Keerthi Mayorga MD via Telemedicine AudioVisual using the secure [employer] software platform with 2 way audio/video. The provider was located off-site and the patient is located in the hospital. The aforementioned video software was utilized to document the relevant history  and physical exam    Keerthi Mayorga MD  Department of Hospital Medicine   [facility] - Telemetry Stepdown

## 2022-07-05 NOTE — ASSESSMENT & PLAN NOTE
Estimated Creatinine Clearance: 57.1 mL/min (A) (based on SCr of 1.5 mg/dL (H)).  Per med history, patient no longer taking ARB.  sCr up slightly and vanco level > 20.  Discontinued ARB, holding diuretic for now. Trial of gentle hydration ineffective.  Discontinued vancomycin.  -variable sCr

## 2022-07-05 NOTE — PT/OT/SLP PROGRESS
Occupational Therapy      Patient Name:  Saji Castañeda   MRN:  [id number]    Patient not seen today secondary to Off the floor for procedure/surgery (RN reported patient awaiting transport for ultrasound). Will follow-up tomorrow per OT POC.    KATHLEEN Ramirez    7/5/2022

## 2022-07-06 PROBLEM — I82.621 ACUTE DEEP VEIN THROMBOSIS (DVT) OF RIGHT UPPER EXTREMITY: Status: ACTIVE | Noted: 2022-07-06

## 2022-07-06 LAB
ALBUMIN SERPL BCP-MCNC: 2.5 G/DL (ref 3.5–5.2)
ALP SERPL-CCNC: 62 U/L (ref 55–135)
ALT SERPL W/O P-5'-P-CCNC: 16 U/L (ref 10–44)
ANION GAP SERPL CALC-SCNC: 11 MMOL/L (ref 8–16)
AST SERPL-CCNC: 23 U/L (ref 10–40)
BASOPHILS # BLD AUTO: 0.05 K/UL (ref 0–0.2)
BASOPHILS NFR BLD: 0.6 % (ref 0–1.9)
BILIRUB SERPL-MCNC: 0.3 MG/DL (ref 0.1–1)
BUN SERPL-MCNC: 29 MG/DL (ref 8–23)
CALCIUM SERPL-MCNC: 8.6 MG/DL (ref 8.7–10.5)
CHLORIDE SERPL-SCNC: 108 MMOL/L (ref 95–110)
CO2 SERPL-SCNC: 19 MMOL/L (ref 23–29)
CREAT SERPL-MCNC: 1.5 MG/DL (ref 0.5–1.4)
DIFFERENTIAL METHOD: ABNORMAL
EOSINOPHIL # BLD AUTO: 1 K/UL (ref 0–0.5)
EOSINOPHIL NFR BLD: 11 % (ref 0–8)
ERYTHROCYTE [DISTWIDTH] IN BLOOD BY AUTOMATED COUNT: 17.2 % (ref 11.5–14.5)
EST. GFR  (AFRICAN AMERICAN): 52.6 ML/MIN/1.73 M^2
EST. GFR  (NON AFRICAN AMERICAN): 45.5 ML/MIN/1.73 M^2
GLUCOSE SERPL-MCNC: 147 MG/DL (ref 70–110)
HCT VFR BLD AUTO: 32.6 % (ref 40–54)
HGB BLD-MCNC: 10.3 G/DL (ref 14–18)
IMM GRANULOCYTES # BLD AUTO: 0.05 K/UL (ref 0–0.04)
IMM GRANULOCYTES NFR BLD AUTO: 0.6 % (ref 0–0.5)
LYMPHOCYTES # BLD AUTO: 1.8 K/UL (ref 1–4.8)
LYMPHOCYTES NFR BLD: 20.5 % (ref 18–48)
MCH RBC QN AUTO: 25.4 PG (ref 27–31)
MCHC RBC AUTO-ENTMCNC: 31.6 G/DL (ref 32–36)
MCV RBC AUTO: 81 FL (ref 82–98)
MONOCYTES # BLD AUTO: 0.8 K/UL (ref 0.3–1)
MONOCYTES NFR BLD: 9.2 % (ref 4–15)
NEUTROPHILS # BLD AUTO: 5.2 K/UL (ref 1.8–7.7)
NEUTROPHILS NFR BLD: 58.1 % (ref 38–73)
NRBC BLD-RTO: 0 /100 WBC
PLATELET # BLD AUTO: 324 K/UL (ref 150–450)
PMV BLD AUTO: 10.1 FL (ref 9.2–12.9)
POCT GLUCOSE: 104 MG/DL (ref 70–110)
POCT GLUCOSE: 154 MG/DL (ref 70–110)
POCT GLUCOSE: 154 MG/DL (ref 70–110)
POCT GLUCOSE: 185 MG/DL (ref 70–110)
POTASSIUM SERPL-SCNC: 4.2 MMOL/L (ref 3.5–5.1)
PROT SERPL-MCNC: 6.9 G/DL (ref 6–8.4)
RBC # BLD AUTO: 4.05 M/UL (ref 4.6–6.2)
SODIUM SERPL-SCNC: 138 MMOL/L (ref 136–145)
WBC # BLD AUTO: 8.92 K/UL (ref 3.9–12.7)

## 2022-07-06 PROCEDURE — 97535 SELF CARE MNGMENT TRAINING: CPT

## 2022-07-06 PROCEDURE — 25000003 PHARM REV CODE 250: Performed by: HOSPITALIST

## 2022-07-06 PROCEDURE — 97530 THERAPEUTIC ACTIVITIES: CPT

## 2022-07-06 PROCEDURE — 85025 COMPLETE CBC W/AUTO DIFF WBC: CPT | Performed by: INTERNAL MEDICINE

## 2022-07-06 PROCEDURE — 25000003 PHARM REV CODE 250: Performed by: PHYSICIAN ASSISTANT

## 2022-07-06 PROCEDURE — 63600175 PHARM REV CODE 636 W HCPCS: Performed by: PHYSICIAN ASSISTANT

## 2022-07-06 PROCEDURE — 25000003 PHARM REV CODE 250: Performed by: NURSE PRACTITIONER

## 2022-07-06 PROCEDURE — A4216 STERILE WATER/SALINE, 10 ML: HCPCS | Performed by: INTERNAL MEDICINE

## 2022-07-06 PROCEDURE — 27000207 HC ISOLATION

## 2022-07-06 PROCEDURE — 25000003 PHARM REV CODE 250: Performed by: INTERNAL MEDICINE

## 2022-07-06 PROCEDURE — 20600001 HC STEP DOWN PRIVATE ROOM

## 2022-07-06 PROCEDURE — 97112 NEUROMUSCULAR REEDUCATION: CPT | Mod: CQ

## 2022-07-06 PROCEDURE — 80053 COMPREHEN METABOLIC PANEL: CPT | Performed by: INTERNAL MEDICINE

## 2022-07-06 PROCEDURE — 36415 COLL VENOUS BLD VENIPUNCTURE: CPT | Performed by: INTERNAL MEDICINE

## 2022-07-06 PROCEDURE — 97530 THERAPEUTIC ACTIVITIES: CPT | Mod: CQ

## 2022-07-06 RX ADMIN — Medication 10 ML: at 06:07

## 2022-07-06 RX ADMIN — TAMSULOSIN HYDROCHLORIDE 0.4 MG: 0.4 CAPSULE ORAL at 08:07

## 2022-07-06 RX ADMIN — HYDRALAZINE HYDROCHLORIDE 25 MG: 25 TABLET, FILM COATED ORAL at 08:07

## 2022-07-06 RX ADMIN — HYDRALAZINE HYDROCHLORIDE 25 MG: 25 TABLET, FILM COATED ORAL at 09:07

## 2022-07-06 RX ADMIN — APIXABAN 10 MG: 5 TABLET, FILM COATED ORAL at 08:07

## 2022-07-06 RX ADMIN — Medication 10 ML: at 12:07

## 2022-07-06 RX ADMIN — INSULIN ASPART 20 UNITS: 100 INJECTION, SOLUTION INTRAVENOUS; SUBCUTANEOUS at 04:07

## 2022-07-06 RX ADMIN — NIFEDIPINE 90 MG: 30 TABLET, FILM COATED, EXTENDED RELEASE ORAL at 08:07

## 2022-07-06 RX ADMIN — HYDRALAZINE HYDROCHLORIDE 25 MG: 25 TABLET, FILM COATED ORAL at 12:07

## 2022-07-06 RX ADMIN — ISOSORBIDE DINITRATE 10 MG: 10 TABLET ORAL at 09:07

## 2022-07-06 RX ADMIN — MELATONIN TAB 3 MG 6 MG: 3 TAB at 09:07

## 2022-07-06 RX ADMIN — ISOSORBIDE DINITRATE 10 MG: 10 TABLET ORAL at 04:07

## 2022-07-06 RX ADMIN — Medication 1 CAPSULE: at 09:07

## 2022-07-06 RX ADMIN — ASPIRIN 81 MG: 81 TABLET, COATED ORAL at 09:07

## 2022-07-06 RX ADMIN — APIXABAN 10 MG: 5 TABLET, FILM COATED ORAL at 09:07

## 2022-07-06 RX ADMIN — INSULIN ASPART 20 UNITS: 100 INJECTION, SOLUTION INTRAVENOUS; SUBCUTANEOUS at 12:07

## 2022-07-06 RX ADMIN — DAPTOMYCIN 995 MG: 350 INJECTION, POWDER, LYOPHILIZED, FOR SOLUTION INTRAVENOUS at 12:07

## 2022-07-06 RX ADMIN — INSULIN ASPART 20 UNITS: 100 INJECTION, SOLUTION INTRAVENOUS; SUBCUTANEOUS at 08:07

## 2022-07-06 RX ADMIN — HYDRALAZINE HYDROCHLORIDE 25 MG: 25 TABLET, FILM COATED ORAL at 04:07

## 2022-07-06 RX ADMIN — GABAPENTIN 200 MG: 100 CAPSULE ORAL at 09:07

## 2022-07-06 RX ADMIN — GABAPENTIN 200 MG: 100 CAPSULE ORAL at 08:07

## 2022-07-06 RX ADMIN — DAPTOMYCIN 995 MG: 350 INJECTION, POWDER, LYOPHILIZED, FOR SOLUTION INTRAVENOUS at 06:07

## 2022-07-06 RX ADMIN — OXYCODONE HYDROCHLORIDE AND ACETAMINOPHEN 1 TABLET: 5; 325 TABLET ORAL at 09:07

## 2022-07-06 RX ADMIN — Medication 1 CAPSULE: at 08:07

## 2022-07-06 RX ADMIN — ATORVASTATIN CALCIUM 80 MG: 20 TABLET, FILM COATED ORAL at 08:07

## 2022-07-06 RX ADMIN — ISOSORBIDE DINITRATE 10 MG: 10 TABLET ORAL at 08:07

## 2022-07-06 NOTE — PT/OT/SLP PROGRESS
Occupational Therapy   Co-Treatment  Co-treatment performed due to patient's multiple deficits requiring two skilled therapists to appropriately and safely assess patient's strength and endurance while facilitating functional tasks in addition to accommodating for patient's activity tolerance.     Name: Saji Castañeda  MRN: [id number]  Admitting Diagnosis:  Osteomyelitis of left lower extremity       Recommendations:     Discharge Recommendations: nursing facility, skilled  Discharge Equipment Recommendations:  bedside commode, walker, rolling, wheelchair  Barriers to discharge:  Decreased caregiver support    Assessment:     Saji Castañeda is a 72 y.o. male with a medical diagnosis of Osteomyelitis of left lower extremity. Performance deficits affecting function are weakness, impaired endurance, impaired self care skills, impaired functional mobilty, gait instability, decreased lower extremity function, impaired skin, decreased safety awareness, pain, edema, orthopedic precautions, decreased coordination, impaired balance. Pt tolerated session well with good participation and motivation to progress toward goals. Pt demonstrated improvements today with prolonged sitting/standing from EOB on this date during performance of ADLs and functional mobility. Pt is progressing well overall but does remain limited by generalized weakness and instability therefore requiring Min-Mod (A) at times. Pt also requires increased time to complete tasks along with cues for safety with transfers.     BP beginning of session EOB: 191/74  BP following prolonged seated rest break EOB: 168/77  BP end of session: 143/64  Pt with no c/o throughout     Rehab Prognosis:  Good; patient would benefit from acute skilled OT services to address these deficits and reach maximum level of function.       Plan:     Patient to be seen 3 x/week to address the above listed problems via self-care/home management, therapeutic activities, therapeutic exercises,  "neuromuscular re-education  · Plan of Care Expires: 07/21/22  · Plan of Care Reviewed with: patient    Subjective   "thank you"  Pain/Comfort:  · Pain Rating 1: 0/10 (unrated)  · Pain Rating Post-Intervention 1: 0/10    Objective:     Communicated with: RN prior to session.  Patient found HOB elevated with telemetry, pulse ox (continuous), bed alarm, pressure relief boots upon OT entry to room.    Additional staff present: RODOLFO Odell    General Precautions: Standard, contact, fall   Orthopedic Precautions:LLE partial weight bearing (for tf's only)   Braces:  (Darco shoe RLE)  Respiratory Status: Room air     Occupational Performance:     Bed Mobility:    · Patient completed Scooting with stand by assistance to bring hips anteriorly  · Patient completed Supine to Sit with moderate assistance for trunk management  · Patient completed Sit to Supine with minimum assistance to raise RLE    Functional Mobility/Transfers:  · Sit <> Stand Transfer with moderate assistance and RW  · x3 trials with 1st attempt unsuccessful and required cues for sequencing, body mechanics, and hand placement  · Tactile/verbal cues for upright posture and forward gaze  · Pt able to tolerate static stand x2 trials for ~1 minute each    Activities of Daily Living:  · Feeding:  set-up (A) to drink water from cup  · Grooming: set-up (A)  to wash face with wash cloth  · Lower Body Dressing: total assistance to don darco shoe RLE  · Toileting: total assistance to cleanse anteriorly/posteriorly in standing due to pt found on soiled pads    [facility] 6 Click ADL: 17    Treatment & Education:  -Education on energy conservation and task modification to maximize safety and (I) during ADLs and mobility  -Education on importance of OOB activity to improve overall activity tolerance and promote recovery  -Pt educated to call for assistance and to transfer with hospital staff only  -Provided education regarding role of OT, POC, & discharge recommendations with pt " verbalizing understanding.  Pt had no further questions & when asked whether there were any concerns pt reported none.    Patient left HOB elevated with all lines intact, call button in reach and RN notifiedEducation:      GOALS:   Multidisciplinary Problems     Occupational Therapy Goals        Problem: Occupational Therapy    Goal Priority Disciplines Outcome Interventions   Occupational Therapy Goal     OT, PT/OT Ongoing, Progressing    Description: Goals to be met by: 07-02-22     Patient will increase functional independence with ADLs by performing:    UE Dressing with Set-up Assistance.  LE Dressing with Minimal Assistance.  Grooming while seated with Set-up Assistance.  Toileting from bedside commode with Stand-by Assistance for hygiene and clothing management.   Supine to sit with Stand-by Assistance.  Stand pivot transfers with Stand-by Assistance with RW and PWB in left heel only  Toilet transfer to bedside commode with Stand-by Assistance.  Pt. To perform scoot pivot transfer with Supervision to and from the wheelchair                     Time Tracking:     OT Date of Treatment: 07/06/22  OT Start Time: 1402  OT Stop Time: 1435  OT Total Time (min): 33 min    Billable Minutes:Self Care/Home Management 15  Therapeutic Activity 18    OT/KATHLEEN: OT          7/6/2022

## 2022-07-06 NOTE — PLAN OF CARE
Problem: Impaired Wound Healing  Goal: Optimal Wound Healing  Outcome: Ongoing, Progressing     Problem: Infection  Goal: Absence of Infection Signs and Symptoms  Outcome: Ongoing, Progressing     Problem: Fall Injury Risk  Goal: Absence of Fall and Fall-Related Injury  Outcome: Ongoing, Progressing     PT's RUE PICC LINE REMOVED D/T THROMBUS CONFIRMED PER US. PIV INSERTED TO LLE. PT RECEIVED DAPTOMYCIN ONCE PIV WAS INSERTED. PT TOLERATED PROCEDURE WELL. PT EDUCATED ON USE OF CALL BELL AND VERBALLY UNDERSTANDS TO CALL FOR ASSISTANCE WHEN GETTING OUT OF BED. CALL LIGHT IN REACH AND BED ALARM ACTIVATED.

## 2022-07-06 NOTE — PLAN OF CARE
Spoke with Seun ([phone]) at [facility] who reports insurance auth is still pending. Insurance auth escalated to leadership.    Updated patient at bedside.    12:35 PM  Spoke with Seun at [facility] who reports since patient's particular [employer] insurance plan is not in network with LTACs in the area, [facility] is attempting to obtain a single case agreement.    Toshia Dennis, LORNA  [facility]  Ext. [phone]

## 2022-07-06 NOTE — PT/OT/SLP PROGRESS
Occupational Therapy      Patient Name:  Saji Castañeda   MRN:  [id number]    Patient not seen today secondary to Nurse/ EVELINA hold due to pt's BP read at 194/74 in supine at rest. Will follow-up this PM if pt appropriate for OT treatment session.    7/6/2022

## 2022-07-06 NOTE — PROGRESS NOTES
"  [facility] - Telemetry Stepdown  Adult Nutrition  Consult Note    SUMMARY     Recommendations    1. Continue current Cardiac, Diabetic diet.   2. RD to monitor & follow-up.    Goals: Meet % EEN, EPN by RD f/u date  Nutrition Goal Status: goal met  Communication of RD Recs: reviewed with RN    Assessment and Plan    Nutrition Problem:  Excessive CHO intake     Related to (etiology):   Food and nutrition related knowledge deficit      Signs and Symptoms (as evidenced by):   A1C 9.5     Interventions(treatment strategy):  Collaboration of nutrition care w/ other providers     Nutrition Diagnosis Status:   Continues     Reason for Assessment    Reason For Assessment: RD follow-up  Diagnosis: other (see comments) (Osteomylitis)  Relevant Medical History: DM, L. BKA, CKD  Interdisciplinary Rounds: did not attend    General Information Comments: Pt continues w/ good appetite, tolerating diet w/ 100% PO intake. UBW: 275#; good appetite PTA. No indicators of malnutrition noted. Diabetic diet education attached to pt's discharge paperwork.   Nutrition Discharge Planning: Adequate PO intake    Nutrition/Diet History    Spiritual, Cultural Beliefs, Baptism Practices, Values that Affect Care: no  Factors Affecting Nutritional Intake: None identified at this time    Anthropometrics    Temp: 99 °F (37.2 °C)  Height Method: Stated  Height: 5' 9" (175.3 cm)  Height (inches): 69 in  Weight Method: Bed Scale  Weight: 124.3 kg (274 lb 0.5 oz)  Weight (lb): 274.03 lb  Ideal Body Weight (IBW), Male: 160 lb  % Ideal Body Weight, Male (lb): 171.27 %  BMI (Calculated): 40.4  BMI Grade: greater than 40 - morbid obesity  Amputation %: 5.9  Total Amputation %: 5.9  Amputation Ideal Body Weight (IBW), Male (lb): 154.1 lb  Amputee BMI (kg/m2): 43.12 kg/m2    Lab/Procedures/Meds    Pertinent Labs Reviewed: reviewed  Pertinent Labs Comments: BUN 29, Creat 1.5, GFR 52.6, A1C 9.5  Pertinent Medications Reviewed: reviewed  Pertinent " Medications Comments: Statin    Estimated/Assessed Needs    Weight Used For Calorie Calculations: 124.3 kg (274 lb 0.5 oz)     Energy Calorie Requirements (kcal): 1978 kcal/d  Energy Need Method: [place]-St Jeor (1.0 PAL)     Protein Requirements: 113 g/d (.9 g/kg)  Weight Used For Protein Calculations: 124.8 kg (275 lb 2.2 oz)     Estimated Fluid Requirement Method: RDA Method (Per MD or 1 mL/kcal)  RDA Method (mL): 1978    Nutrition Prescription Ordered    Current Diet Order: 2000 kcal ADA, Cardiac    Evaluation of Received Nutrient/Fluid Intake    I/O: -8L since 6/22    Comments: LBM: 7/5    Tolerance: tolerating    Nutrition Risk    Level of Risk/Frequency of Follow-up:  (1x/week)     Monitor and Evaluation    Food and Nutrient Intake: energy intake, food and beverage intake  Food and Nutrient Adminstration: diet order  Physical Activity and Function: nutrition-related ADLs and IADLs  Anthropometric Measurements: weight, weight change  Biochemical Data, Medical Tests and Procedures: lipid profile, inflammatory profile, glucose/endocrine profile, gastrointestinal profile, electrolyte and renal panel  Nutrition-Focused Physical Findings: overall appearance     Nutrition Follow-Up    RD Follow-up?: Yes

## 2022-07-06 NOTE — ASSESSMENT & PLAN NOTE
Involving the right subclavian, axillary, basilic, and cephalic veins.   -start apixaban therapy and remove PICC line

## 2022-07-06 NOTE — SUBJECTIVE & OBJECTIVE
This encounter was provided through telemedicine.  Patient was transferred to the telemedicine service on:  06/18/2022   The patient location is: 8092/8092 A admitted 6/15/2022  2:10 PM.  Present with the patient at the time of the telemed/virtual assessment: Telepresenter    Interval History/Overnight Events:     Right UE edema mildly improved with elevation- US done showing DVT of RUE - peripheral IV to be placed and PICC line to be pulled; no dyspnea    -he was accepted by a SNF but subsequently declined when they were unable to provide antibiotic, Daptomycin; now pursuing LTAC    Review of Systems   Constitutional:  Positive for activity change.   Respiratory:  Negative for cough and shortness of breath.    Cardiovascular:  Negative for chest pain.   Gastrointestinal:  Negative for diarrhea and vomiting.   Musculoskeletal:  Negative for arthralgias and myalgias.      Inpatient Medications:  Scheduled Meds:   apixaban  10 mg Oral BID    Followed by    [START ON 7/12/2022] apixaban  5 mg Oral BID    aspirin  81 mg Oral Daily    atorvastatin  80 mg Oral Daily    DAPTOmycin (CUBICIN)  IV  8 mg/kg Intravenous Q24H    gabapentin  200 mg Oral BID    hydrALAZINE  25 mg Oral TID    insulin aspart U-100  20 Units Subcutaneous TIDWM    insulin detemir U-100  24 Units Subcutaneous BID    isosorbide dinitrate  10 mg Oral TID    Lactobacillus rhamnosus GG  1 capsule Oral BID    NIFEdipine  90 mg Oral Daily    sodium chloride 0.9%  10 mL Intravenous Q6H    tamsulosin  0.4 mg Oral Daily     Continuous Infusions:  PRN Meds:.acetaminophen, albuterol-ipratropium, aluminum-magnesium hydroxide-simethicone, dextrose 10%, dextrose 10%, glucagon (human recombinant), glucose, glucose, hydrALAZINE, insulin aspart U-100, melatonin, naloxone, ondansetron, oxyCODONE-acetaminophen, prochlorperazine, sars-cov-2 (covid-19), sodium chloride 0.9%, Flushing PICC Protocol **AND** sodium chloride 0.9% **AND** sodium chloride 0.9%      Objective:      Temp:  [98.3 °F (36.8 °C)-98.7 °F (37.1 °C)] 98.3 °F (36.8 °C)  Pulse:  [49-77] 49  Resp:  [16-18] 16  SpO2:  [93 %-98 %] 93 %  BP: (130-146)/(60-71) 146/66      Intake/Output Summary (Last 24 hours) at 7/6/2022 0008  Last data filed at 7/5/2022 0630  Gross per 24 hour   Intake --   Output 300 ml   Net -300 ml          Body mass index is 40.47 kg/m².    Physical Exam  Vitals and nursing note reviewed.   Constitutional:       General: He is not in acute distress.     Appearance: Normal appearance. He is normal weight. He is not ill-appearing.   HENT:      Head: Normocephalic and atraumatic.      Right Ear: Hearing normal.      Left Ear: Hearing normal.      Nose: Nose normal.   Eyes:      General: No scleral icterus.        Right eye: No discharge.         Left eye: No discharge.      Extraocular Movements: Extraocular movements intact.   Cardiovascular:      Rate and Rhythm: Normal rate.   Pulmonary:      Effort: Pulmonary effort is normal. No accessory muscle usage or respiratory distress.   Musculoskeletal:      Right lower leg: Edema present.      Left lower leg: Edema present.      Comments: Left foot with dressing; patient notes edema improved from admit   Neurological:      General: No focal deficit present.      Mental Status: He is alert and oriented to person, place, and time.      Cranial Nerves: No cranial nerve deficit.      Motor: No weakness.   Psychiatric:         Attention and Perception: Attention normal.         Mood and Affect: Mood normal.         Speech: Speech normal.         Behavior: Behavior is cooperative.        Labs:  Recent Results (from the past 24 hour(s))   POCT glucose    Collection Time: 07/05/22  7:23 AM   Result Value Ref Range    POCT Glucose 251 (H) 70 - 110 mg/dL   POCT glucose    Collection Time: 07/05/22 11:32 AM   Result Value Ref Range    POCT Glucose 204 (H) 70 - 110 mg/dL   POCT glucose    Collection Time: 07/05/22  3:29 PM   Result Value Ref Range    POCT Glucose 116  (H) 70 - 110 mg/dL   POCT glucose    Collection Time: 07/05/22  9:49 PM   Result Value Ref Range    POCT Glucose 142 (H) 70 - 110 mg/dL        Lab Results   Component Value Date    QEC52TCIAVPS Negative 06/15/2022       Recent Labs   Lab 06/30/22  0541 07/02/22  0318 07/04/22  0510   WBC 7.83 8.10 8.27   LYMPH 20.1  1.6 21.7  1.8 16.3*  1.4   HGB 10.5* 10.0* 9.9*   HCT 32.8* 32.3* 32.7*    372 364       Recent Labs   Lab 07/01/22  0441 07/02/22  0318 07/02/22  1133 07/04/22  0510    137 136 136   K 4.4 5.4* 4.0 4.2    107 105 106   CO2 24 20* 25 22*   BUN 27* 28* 27* 30*   CREATININE 1.4 1.4 1.4 1.5*   * 160* 211* 261*   CALCIUM 9.5 8.5* 9.1 8.8   MG  --   --  1.9 1.8   PHOS 4.0  --  3.2 3.5       Recent Labs   Lab 06/30/22  0541 07/01/22 0441 07/02/22  0318 07/04/22  0510   ALKPHOS 69  --  65 64   ALT 13  --  13 15   AST 15  --  18 18   ALBUMIN 2.7* 2.7* 2.6* 2.5*   PROT 6.7  --  6.6 7.0   BILITOT 0.2  --  0.3 0.3          Recent Labs     07/04/22  0510   CRP 14.5*            All labs within the last 24 hours were reviewed.     Microbiology:  Microbiology Results (last 7 days)       ** No results found for the last 168 hours. **              Imaging  ECG Results    None         No results found for this or any previous visit.      US Upper Extremity Veins Right  Narrative: EXAMINATION:  US UPPER EXTREMITY VEINS RIGHT    CLINICAL HISTORY:  edema to RUE;    TECHNIQUE:  Duplex and color flow Doppler evaluation and dynamic compression was performed of the right upper extremity veins.    COMPARISON:  None    FINDINGS:  Right PICC line.    Central veins: The right subclavian vein and right axillary vein are thrombosed and noncompressible.  The internal jugular vein is patent and free of thrombus.    Arm veins: The right basilic vein and right cephalic vein are thrombosed and noncompressible.  The brachial vein is patent and compressible.    Contralateral subclavian/internal jugular  veins: The left subclavian and internal jugular veins are patent and free of thrombus.    There is a PICC catheter, partially visualized within the right subclavian and right basilic vein.  Impression: Thrombosis of the right subclavian, axillary, basilic, and cephalic veins.    This critical information above was relayed by Raegan Melvin MD by telephone to Keerthi Mayorga MD on 07/05/2022 at 18:07.    Electronically signed by resident: Raegan Melvin  Date:    07/05/2022  Time:    17:59    Electronically signed by: Milton Dove MD  Date:    07/05/2022  Time:    18:32      All imaging within the last 24 hours was reviewed.       Discharge Planning   OXANA: 7/6/2022     Code Status: Full Code   Is the patient medically ready for discharge?: Yes    Reason for patient still in hospital (select all that apply): Patient trending condition, Treatment, and Pending disposition  Discharge Plan A: LTAC  Discharge Delays: no accepting SNF

## 2022-07-06 NOTE — PT/OT/SLP PROGRESS
"Physical Therapy Treatment    Patient Name:  Saji Castañeda   MRN:  [id number]    Recommendations:     Discharge Recommendations:  nursing facility, skilled   Discharge Equipment Recommendations: wheelchair, bedside commode, walker, rolling   Barriers to discharge: Inaccessible home and Decreased caregiver support    Co-treatment performed due to patient's multiple deficits requiring two skilled therapists to appropriately and safely assess patient's strength and endurance while facilitating functional tasks in addition to accommodating for patient's activity tolerance.   Assessment:     Saji Castañeda is a 72 y.o. male admitted with a medical diagnosis of Osteomyelitis of left lower extremity.  He presents with the following impairments/functional limitations:  weakness, impaired self care skills, impaired endurance, impaired functional mobilty, impaired balance, decreased lower extremity function, gait instability, impaired skin, decreased coordination, orthopedic precautions.    Pt tolerated the session fairly well with good participation. Pt was able to tolerate sitting at the EOB for ~15 minutes and static standing with the rolling walker for up to 1 minute each trial. Pt will continue to benefit from PT services to improve functional mobility independence.    Rehab Prognosis: Good; patient would benefit from acute skilled PT services to address these deficits and reach maximum level of function.    Recent Surgery: * No surgery found *      Plan:     During this hospitalization, patient to be seen 3 x/week to address the identified rehab impairments via gait training, therapeutic activities, therapeutic exercises, neuromuscular re-education and progress toward the following goals:    · Plan of Care Expires:  07/20/22    Subjective     Chief Complaint: low endurance, fatigue  Patient/Family Comments/goals: "Let's rock and roll."  Pain/Comfort:  · Pain Rating 1: 0/10  · Pain Rating Post-Intervention 1: " 0/10      Objective:     Communicated with RN prior to session.  Patient found HOB elevated with telemetry, pulse ox (continuous), pressure relief boots, bed alarm upon PT entry to room.     Additional staff present: Emily WHEATLEY    General Precautions: Standard, contact, fall   Orthopedic Precautions:LLE partial weight bearing (PWB to L heel for t/f only)   Braces: N/A  Respiratory Status: Nasal cannula, flow 2 L/min     Functional Mobility:  · Bed Mobility:     · Scooting: stand by assistance  · Supine to Sit: moderate assistance for trunk management with HOB elevated  · Sit to Supine: minimum assistance for (R) LE management with HOB flat  · Transfers:     · Sit to Stand: minimum assistance and moderate assistance of 2 persons with rolling walker, 2 trials. Cued for technique and weight-shifting  · 1st trial mod (A) of 2 persons  · 2nd trial min (A) of 2 persons  · Balance:   · Sitting: SBA  · Standing: Mod (A) of 2 persons, with intermittent Min (A)  · Facilitation provided to promote anterior weight-shift and hip extension.      AM-PAC 6 CLICK MOBILITY  Turning over in bed (including adjusting bedclothes, sheets and blankets)?: 3  Sitting down on and standing up from a chair with arms (e.g., wheelchair, bedside commode, etc.): 2  Moving from lying on back to sitting on the side of the bed?: 3  Moving to and from a bed to a chair (including a wheelchair)?: 2  Need to walk in hospital room?: 2  Climbing 3-5 steps with a railing?: 1  Basic Mobility Total Score: 13       Therapeutic Activities and Exercises:  Pt educated on the goals of the session, weight-shifting, WB precautions, transfer technique, and PT POC.    Patient's BP recorded as follows:  -sitting @EOB: 191/74 (106)  -sitting @EOB 5 minutes later: 168/77 (110)  -sitting @EOB after 1st standing trial: 143/65 (94)  -sitting @EOB after 2nd standing trial: 124/59 (85)    Patient left HOB elevated with all lines intact, call button in reach and bed alarm  on..    GOALS:   Multidisciplinary Problems     Physical Therapy Goals        Problem: Physical Therapy    Goal Priority Disciplines Outcome Goal Variances Interventions   Physical Therapy Goal     PT, PT/OT Ongoing, Progressing     Description: Goals to be met by: 22     Patient will increase functional independence with mobility by performin. Supine to sit with Modified [place]  2. Sit to stand transfer with Supervision  3. Bed to chair transfer with Stand-by Assistance using LRAD  4. Gait  x 10 feet with Stand-by Assistance using LRAD                     Time Tracking:     PT Received On: 22  PT Start Time: 1402     PT Stop Time: 1435  PT Total Time (min): 33 min     Billable Minutes: Therapeutic Activity 15 and Neuromuscular Re-education 18    Treatment Type: Treatment  PT/PTA: PTA     PTA Visit Number: 1     2022

## 2022-07-06 NOTE — PROGRESS NOTES
Aaron Berg - Telemetry [facility] Medicine  Telemedicine Progress Note    Patient Name: Saji Castañeda  MRN: [id number]  Patient Class: IP- Inpatient   Admission Date: 6/15/2022  Length of Stay: 21 days  Attending Physician: Keerthi Mayorga MD  Primary Care Provider: Mart Escalante MD          Subjective:     Principal Problem:Osteomyelitis of left lower extremity        HPI:  71 y.o. M with PMHx DM2 and PVD with chronic diabetic foot wounds s/p L sided BKA, HTN, asymptomatic bradycardia, CKD 3 and HLD who presented to the ED for worsening R foot pain and drainage. Pt. With known chronic ulceration/osteomylitis to L heel and ID has recommended BKA for definitive therapy, but the patient reports that he has not been interested in amputation. Today, he had his usual f/u appointment with ID, and the patient was referred to the ED for imaging with bone biopsy and potential abx because of worsening drainage and pain. Pt. Denies any current fevers, chills, nausea, or other systemic signs of infection.      Overview/Hospital Course:  Patient admitted to hospital medicine. Podiatry and vascular surgery consulted. Patient underwent bone biopsy and culture. Infectious disease consulted.         This encounter was provided through telemedicine.  Patient was transferred to the telemedicine service on:  06/18/2022   The [facility] 6/15/2022  2:10 PM.  Present with the patient at the time of the telemed/virtual assessment: Telepresenter    Interval History/Overnight Events:     Right UE edema mildly improved with elevation- US done showing DVT of RUE - peripheral IV to be placed and PICC line to be pulled; no dyspnea    -he was accepted by a SNF but subsequently declined when they were unable to provide antibiotic, Daptomycin; now pursuing LTAC    Review of Systems   Constitutional:  Positive for activity change.   Respiratory:  Negative for cough and shortness of breath.     Cardiovascular:  Negative for chest pain.   Gastrointestinal:  Negative for diarrhea and vomiting.   Musculoskeletal:  Negative for arthralgias and myalgias.      Inpatient Medications:  Scheduled Meds:   apixaban  10 mg Oral BID    Followed by    [START ON 7/12/2022] apixaban  5 mg Oral BID    aspirin  81 mg Oral Daily    atorvastatin  80 mg Oral Daily    DAPTOmycin (CUBICIN)  IV  8 mg/kg Intravenous Q24H    gabapentin  200 mg Oral BID    hydrALAZINE  25 mg Oral TID    insulin aspart U-100  20 Units Subcutaneous TIDWM    insulin detemir U-100  24 Units Subcutaneous BID    isosorbide dinitrate  10 mg Oral TID    Lactobacillus rhamnosus GG  1 capsule Oral BID    NIFEdipine  90 mg Oral Daily    sodium chloride 0.9%  10 mL Intravenous Q6H    tamsulosin  0.4 mg Oral Daily     Continuous Infusions:  PRN Meds:.acetaminophen, albuterol-ipratropium, aluminum-magnesium hydroxide-simethicone, dextrose 10%, dextrose 10%, glucagon (human recombinant), glucose, glucose, hydrALAZINE, insulin aspart U-100, melatonin, naloxone, ondansetron, oxyCODONE-acetaminophen, prochlorperazine, sars-cov-2 (covid-19), sodium chloride 0.9%, Flushing PICC Protocol **AND** sodium chloride 0.9% **AND** sodium chloride 0.9%      Objective:     Temp:  [98.3 °F (36.8 °C)-98.7 °F (37.1 °C)] 98.3 °F (36.8 °C)  Pulse:  [49-77] 49  Resp:  [16-18] 16  SpO2:  [93 %-98 %] 93 %  BP: (130-146)/(60-71) 146/66      Intake/Output Summary (Last 24 hours) at 7/6/2022 0008  Last data filed at 7/5/2022 0630  Gross per 24 hour   Intake --   Output 300 ml   Net -300 ml          Body mass index is 40.47 kg/m².    Physical Exam  Vitals and nursing note reviewed.   Constitutional:       General: He is not in acute distress.     Appearance: Normal appearance. He is normal weight. He is not ill-appearing.   HENT:      Head: Normocephalic and atraumatic.      Right Ear: Hearing normal.      Left Ear: Hearing normal.      Nose: Nose normal.   Eyes:       General: No scleral icterus.        Right eye: No discharge.         Left eye: No discharge.      Extraocular Movements: Extraocular movements intact.   Cardiovascular:      Rate and Rhythm: Normal rate.   Pulmonary:      Effort: Pulmonary effort is normal. No accessory muscle usage or respiratory distress.   Musculoskeletal:      Right lower leg: Edema present.      Left lower leg: Edema present.      Comments: Left foot with dressing; patient notes edema improved from admit   Neurological:      General: No focal deficit present.      Mental Status: He is alert and oriented to person, place, and time.      Cranial Nerves: No cranial nerve deficit.      Motor: No weakness.   Psychiatric:         Attention and Perception: Attention normal.         Mood and Affect: Mood normal.         Speech: Speech normal.         Behavior: Behavior is cooperative.        Labs:  Recent Results (from the past 24 hour(s))   POCT glucose    Collection Time: 07/05/22  7:23 AM   Result Value Ref Range    POCT Glucose 251 (H) 70 - 110 mg/dL   POCT glucose    Collection Time: 07/05/22 11:32 AM   Result Value Ref Range    POCT Glucose 204 (H) 70 - 110 mg/dL   POCT glucose    Collection Time: 07/05/22  3:29 PM   Result Value Ref Range    POCT Glucose 116 (H) 70 - 110 mg/dL   POCT glucose    Collection Time: 07/05/22  9:49 PM   Result Value Ref Range    POCT Glucose 142 (H) 70 - 110 mg/dL        Lab Results   Component Value Date    [id number] Negative 06/15/2022       Recent Labs   Lab 06/30/22  0541 07/02/22  0318 07/04/22  0510   WBC 7.83 8.10 8.27   LYMPH 20.1  1.6 21.7  1.8 16.3*  1.4   HGB 10.5* 10.0* 9.9*   HCT 32.8* 32.3* 32.7*    372 364       Recent Labs   Lab 07/01/22  0441 07/02/22  0318 07/02/22  1133 07/04/22  0510    137 136 136   K 4.4 5.4* 4.0 4.2    107 105 106   CO2 24 20* 25 22*   BUN 27* 28* 27* 30*   CREATININE 1.4 1.4 1.4 1.5*   * 160* 211* 261*   CALCIUM 9.5 8.5* 9.1 8.8   MG  --   --   1.9 1.8   PHOS 4.0  --  3.2 3.5       Recent Labs   Lab 06/30/22  0541 07/01/22  0441 07/02/22  0318 07/04/22  0510   ALKPHOS 69  --  65 64   ALT 13  --  13 15   AST 15  --  18 18   ALBUMIN 2.7* 2.7* 2.6* 2.5*   PROT 6.7  --  6.6 7.0   BILITOT 0.2  --  0.3 0.3          Recent Labs     07/04/22  0510   CRP 14.5*            All labs within the last 24 hours were reviewed.     Microbiology:  Microbiology Results (last 7 days)       ** No results found for the last 168 hours. **              Imaging  ECG Results    None         No results found for this or any previous visit.      US Upper Extremity Veins Right  Narrative: EXAMINATION:  US UPPER EXTREMITY VEINS RIGHT    CLINICAL HISTORY:  edema to RUE;    TECHNIQUE:  Duplex and color flow Doppler evaluation and dynamic compression was performed of the right upper extremity veins.    COMPARISON:  None    FINDINGS:  Right PICC line.    Central veins: The right subclavian vein and right axillary vein are thrombosed and noncompressible.  The internal jugular vein is patent and free of thrombus.    Arm veins: The right basilic vein and right cephalic vein are thrombosed and noncompressible.  The brachial vein is patent and compressible.    Contralateral subclavian/internal jugular veins: The left subclavian and internal jugular veins are patent and free of thrombus.    There is a PICC catheter, partially visualized within the right subclavian and right basilic vein.  Impression: Thrombosis of the right subclavian, axillary, basilic, and cephalic veins.    This critical information above was relayed by Raegan Melvin MD by telephone to Keerthi Mayorga MD on 07/05/2022 at 18:07.    Electronically signed by resident: Raegan Melvin  Date:    07/05/2022  Time:    17:59    Electronically signed by: Milton Dove MD  Date:    07/05/2022  Time:    18:32      All imaging within the last 24 hours was reviewed.       Discharge Planning   OXANA: 7/6/2022     Code Status: Full Code   Is  the patient medically ready for discharge?: Yes    Reason for patient still in hospital (select all that apply): Patient trending condition, Treatment, and Pending disposition  Discharge Plan A: LTAC  Discharge Delays: no accepting SNF          Assessment/Plan:      * Osteomyelitis of left lower extremity  -With acute on chronic osteomyleitis of LLE including heel with purulent foul smelling drainage  -Podiatry consulted, Not septic appearing. S/p bone biopsy.  -ID consulted. Cultures growing Staph aureus and GBS   - L foot wound anaerobic culture growing Prevotella and Porphyromonas Somerae. PO Flagyl added.    - L foot bone culture growing Staph species, continued IV ceftriaxone and vancomycin (per Pharmacy dosing).  - with acute renal insufficiency, vancomycin changed to daptomycin and ceftriaxone discontinued as not needed based on current cultures. Continuing Flagyl  -Continuing daptomycin and Flagyl; Estimated end date of daptomycin: 7/28/22. Estimated end date of Flagyl: 7/4/22    Acute deep vein thrombosis (DVT) of right upper extremity   Involving the right subclavian, axillary, basilic, and cephalic veins.   -start apixaban therapy and remove PICC line      Acute renal insufficiency  Estimated Creatinine Clearance: 57.1 mL/min (A) (based on SCr of 1.5 mg/dL (H)).  Per med history, patient no longer taking ARB.  sCr up slightly and vanco level > 20.  Discontinued ARB, holding diuretic for now. Trial of gentle hydration ineffective.  Discontinued vancomycin.  -variable sCr     Peripheral arterial disease  -PVD contributing to current non-healing foot wound. Arterial US shows LLE atherosclerotic disease, areas of high-grade stenosis within the L popliteal artery  -Continue home statin, ASA  -Vascular Surgery consulted - patient considering surgery (BKA); seems reluctant primarily due to the time he expects it to take to obtain a prosthetic limb. Wants to try conservative management with antibiotics.  -Plan  for SNF for IV antibiotics, PT/OT  - Follow up with Podiatry, ID and Vascular Surgery    Asymptomatic Mobitz (type) I (Wenckebach's) atrioventricular block  -Bradycardic in ED but asymptomatic, chronic issue  -Repeat EKG 6/19:  BPM 44 Normal sinus rhythm - AV block, 2:1 - Nonspecific T wave abnormality   -avoid BB and chronotropic CCB  -cardiology reviewed with no intervention recommended; will monitor electrolytes    Chronic kidney disease, stage III (moderate)  -sCr baseline 1.1  -Continue to monitor  -sCr remained > baseline; near to baseline 6/26  -sCr 1.4 on 6/29; HCTZ was resumed on 6/27 for hypertension but stopped on 6/30 when Cr elevated    Type 2 diabetes, uncontrolled, with neuropathy  -Last A1c 2020, repeat > 9%. Per patient, taking 68 units Lantus QHS along with metformin  mg and glipizide 10 mg daily (Possibly, he is not sure)  -Per Pharm med history: patient is no longer taking insulin  -Mildly hypoglycemic on admit with BG 63  -decreased dose of basal insulin to 15 units Levemir, Titrate up as needed. Added prandial insulin 6/19  -worsening hyperglycemia as oral agents wash out. Levemir increased to BID and aspart increased with meals.  - doses adjusted 6/21; increased for 6/23, and again for 6/25  - some hyperglycemia due to dietary indiscretion; decreased basal insulin 6/27  - BGs controlled when patient adheres to diet (no regular sodas)    Essential hypertension  Continued losartan 100mg and HCTZ 25 mg, with hydralazine 25 mg PO q8h PRN  Added nifedipine 6/18  Consulted PharmD for med history: patient reports no longer taking isosorbide-hydralazine, losartan, and losartan-HCTZ  Discontinued ARB, held HCTZ due to increasing sCr. Continued nifedipine for BP control.  Resumed diuretic when renal insufficiency improved; but did not tolerate so stopped  Hydralazine dose changed multiple times; isordil added to regimen on 7/2      VTE Risk Mitigation (From admission, onward)         Ordered      "apixaban tablet 5 mg  2 times daily        "Followed by" Linked Group Details    07/05/22 1858     apixaban tablet 10 mg  2 times daily        "Followed by" Linked Group Details    07/05/22 1858     IP VTE HIGH RISK PATIENT  Once         06/15/22 1957     Place sequential compression device  Until discontinued         06/15/22 1957                    I have assessed these findings virtually using a telemed platform and with assistance of the bedside nurse.        The attending portion of this evaluation, treatment, and documentation was performed per Keerthi Mayorga MD via Telemedicine AudioVisual using the secure [employer] software platform with 2 way audio/video. The provider was located off-site and the patient is located in the hospital. The aforementioned video software was utilized to document the relevant history and physical exam    Keerthi Mayorga MD  Department of Hospital Medicine   [facility] - Telemetry Stepdown  "

## 2022-07-06 NOTE — PT/OT/SLP PROGRESS
Physical Therapy      Patient Name:  Saji Castañeda   MRN:  [id number]    Patient attempted by PT and OT services @1158, but hold per nurse due to high BP recorded at 194/74 mmHg. Will follow-up in the afternoon.

## 2022-07-07 LAB
POCT GLUCOSE: 107 MG/DL (ref 70–110)
POCT GLUCOSE: 136 MG/DL (ref 70–110)
POCT GLUCOSE: 147 MG/DL (ref 70–110)
POCT GLUCOSE: 155 MG/DL (ref 70–110)

## 2022-07-07 PROCEDURE — 97112 NEUROMUSCULAR REEDUCATION: CPT

## 2022-07-07 PROCEDURE — 20600001 HC STEP DOWN PRIVATE ROOM

## 2022-07-07 PROCEDURE — 25000003 PHARM REV CODE 250: Performed by: HOSPITALIST

## 2022-07-07 PROCEDURE — 63600175 PHARM REV CODE 636 W HCPCS: Performed by: PHYSICIAN ASSISTANT

## 2022-07-07 PROCEDURE — 27000207 HC ISOLATION

## 2022-07-07 PROCEDURE — 25000003 PHARM REV CODE 250: Performed by: INTERNAL MEDICINE

## 2022-07-07 PROCEDURE — 99233 SBSQ HOSP IP/OBS HIGH 50: CPT | Mod: 95,,, | Performed by: INTERNAL MEDICINE

## 2022-07-07 PROCEDURE — 99233 PR SUBSEQUENT HOSPITAL CARE,LEVL III: ICD-10-PCS | Mod: 95,,, | Performed by: INTERNAL MEDICINE

## 2022-07-07 PROCEDURE — 97530 THERAPEUTIC ACTIVITIES: CPT

## 2022-07-07 PROCEDURE — 25000003 PHARM REV CODE 250: Performed by: PHYSICIAN ASSISTANT

## 2022-07-07 PROCEDURE — A4216 STERILE WATER/SALINE, 10 ML: HCPCS | Performed by: INTERNAL MEDICINE

## 2022-07-07 PROCEDURE — 97110 THERAPEUTIC EXERCISES: CPT

## 2022-07-07 RX ADMIN — APIXABAN 10 MG: 5 TABLET, FILM COATED ORAL at 11:07

## 2022-07-07 RX ADMIN — Medication 1 CAPSULE: at 11:07

## 2022-07-07 RX ADMIN — Medication 10 ML: at 03:07

## 2022-07-07 RX ADMIN — TAMSULOSIN HYDROCHLORIDE 0.4 MG: 0.4 CAPSULE ORAL at 08:07

## 2022-07-07 RX ADMIN — OXYCODONE HYDROCHLORIDE AND ACETAMINOPHEN 1 TABLET: 5; 325 TABLET ORAL at 11:07

## 2022-07-07 RX ADMIN — INSULIN ASPART 20 UNITS: 100 INJECTION, SOLUTION INTRAVENOUS; SUBCUTANEOUS at 08:07

## 2022-07-07 RX ADMIN — HYDRALAZINE HYDROCHLORIDE 25 MG: 25 TABLET, FILM COATED ORAL at 11:07

## 2022-07-07 RX ADMIN — Medication 10 ML: at 12:07

## 2022-07-07 RX ADMIN — ASPIRIN 81 MG: 81 TABLET, COATED ORAL at 08:07

## 2022-07-07 RX ADMIN — ATORVASTATIN CALCIUM 80 MG: 20 TABLET, FILM COATED ORAL at 08:07

## 2022-07-07 RX ADMIN — ISOSORBIDE DINITRATE 10 MG: 10 TABLET ORAL at 08:07

## 2022-07-07 RX ADMIN — DAPTOMYCIN 995 MG: 350 INJECTION, POWDER, LYOPHILIZED, FOR SOLUTION INTRAVENOUS at 05:07

## 2022-07-07 RX ADMIN — HYDRALAZINE HYDROCHLORIDE 25 MG: 25 TABLET, FILM COATED ORAL at 08:07

## 2022-07-07 RX ADMIN — Medication 10 ML: at 06:07

## 2022-07-07 RX ADMIN — INSULIN ASPART 20 UNITS: 100 INJECTION, SOLUTION INTRAVENOUS; SUBCUTANEOUS at 05:07

## 2022-07-07 RX ADMIN — ISOSORBIDE DINITRATE 10 MG: 10 TABLET ORAL at 03:07

## 2022-07-07 RX ADMIN — Medication 1 CAPSULE: at 08:07

## 2022-07-07 RX ADMIN — APIXABAN 10 MG: 5 TABLET, FILM COATED ORAL at 08:07

## 2022-07-07 RX ADMIN — Medication 10 ML: at 05:07

## 2022-07-07 RX ADMIN — HYDRALAZINE HYDROCHLORIDE 25 MG: 25 TABLET, FILM COATED ORAL at 03:07

## 2022-07-07 RX ADMIN — ISOSORBIDE DINITRATE 10 MG: 10 TABLET ORAL at 11:07

## 2022-07-07 RX ADMIN — GABAPENTIN 200 MG: 100 CAPSULE ORAL at 08:07

## 2022-07-07 RX ADMIN — NIFEDIPINE 90 MG: 30 TABLET, FILM COATED, EXTENDED RELEASE ORAL at 08:07

## 2022-07-07 RX ADMIN — GABAPENTIN 200 MG: 100 CAPSULE ORAL at 11:07

## 2022-07-07 NOTE — SUBJECTIVE & OBJECTIVE
Subjective:     Interval History: Hypertensive and hypoxic otherwise. Vitals stable. No new pedal complaints. Dressings clean dry, intact.          Scheduled Meds:   apixaban  10 mg Oral BID    Followed by    [START ON 7/12/2022] apixaban  5 mg Oral BID    aspirin  81 mg Oral Daily    atorvastatin  80 mg Oral Daily    DAPTOmycin (CUBICIN)  IV  8 mg/kg Intravenous Q24H    gabapentin  200 mg Oral BID    hydrALAZINE  25 mg Oral TID    insulin aspart U-100  20 Units Subcutaneous TIDWM    insulin detemir U-100  24 Units Subcutaneous BID    isosorbide dinitrate  10 mg Oral TID    Lactobacillus rhamnosus GG  1 capsule Oral BID    NIFEdipine  90 mg Oral Daily    sodium chloride 0.9%  10 mL Intravenous Q6H    tamsulosin  0.4 mg Oral Daily     Continuous Infusions:  PRN Meds:acetaminophen, albuterol-ipratropium, aluminum-magnesium hydroxide-simethicone, dextrose 10%, dextrose 10%, glucagon (human recombinant), glucose, glucose, hydrALAZINE, insulin aspart U-100, melatonin, naloxone, ondansetron, oxyCODONE-acetaminophen, prochlorperazine, sars-cov-2 (covid-19), sodium chloride 0.9%, Flushing PICC Protocol **AND** sodium chloride 0.9% **AND** sodium chloride 0.9%    Review of Systems   Constitutional:  Negative for activity change, chills, fever and unexpected weight change.   HENT:  Negative for congestion and sore throat.    Respiratory:  Negative for cough, shortness of breath and wheezing.    Cardiovascular:  Negative for chest pain, palpitations and leg swelling.   Gastrointestinal:  Negative for abdominal pain, blood in stool, nausea and vomiting.   Genitourinary:  Negative for dysuria and hematuria.   Musculoskeletal:  Positive for gait problem. Negative for arthralgias and neck pain.   Skin:  Positive for wound. Negative for color change and rash.   Neurological:  Positive for numbness. Negative for dizziness and seizures.   Psychiatric/Behavioral:  Negative for hallucinations and suicidal ideas.    All other systems  reviewed and are negative.  Objective:     Vital Signs (Most Recent):  Temp: 99.2 °F (37.3 °C) (07/07/22 1125)  Pulse: 61 (07/07/22 1125)  Resp: 18 (07/07/22 1125)  BP: (!) 141/63 (07/07/22 1125)  SpO2: (!) 94 % (07/07/22 1125)   Vital Signs (24h Range):  Temp:  [97.8 °F (36.6 °C)-99.2 °F (37.3 °C)] 99.2 °F (37.3 °C)  Pulse:  [47-77] 61  Resp:  [17-21] 18  SpO2:  [93 %-98 %] 94 %  BP: (118-169)/(63-75) 141/63     Weight: 124.3 kg (274 lb 0.5 oz)  Body mass index is 40.47 kg/m².    Foot Exam    General  Orientation: alert and oriented to person, place, and time   Affect: appropriate       Right Foot/Ankle     Inspection and Palpation  Ecchymosis: none  Tenderness: none   Swelling: (BLE edema)  Skin Exam: skin intact;     Neurovascular  Dorsalis pedis: doppler  Posterior tibial: doppler  Saphenous nerve sensation: diminished  Tibial nerve sensation: diminished  Superficial peroneal nerve sensation: diminished  Deep peroneal nerve sensation: diminished  Sural nerve sensation: diminished      Left Foot/Ankle      Inspection and Palpation  Ecchymosis: none  Tenderness: (Tenderness to the posterior heel wound)  Swelling: (significant b/l LE edema)  Skin Exam: skin changes and ulcer; no drainage (resolved)     Neurovascular  Dorsalis pedis: doppler  Posterior tibial: doppler  Saphenous nerve sensation: diminished  Tibial nerve sensation: diminished  Superficial peroneal nerve sensation: diminished  Deep peroneal nerve sensation: diminished  Sural nerve sensation: diminished    Comments  -Left Chopart amputation  --Left posterior heel wound down to subcutaneous tissue with thin soft tissue covering overlying calcaneus. Wound bed viable. Serous drainage now resolved. Mild tenderness. Periwound maceration improved. Periwound callus.  -Left posterior left partial thickness ulcer with viable wound bed not clinically infected  -Left ankle mild pain with tib-fib squeeze but not with PROM of ankle or end range of ankle.      7/7 7/7 left posterior leg          Laboratory:  Blood Cultures: No results for input(s): LABBLOO in the last 48 hours.  CBC:   Recent Labs   Lab 07/06/22  0406   WBC 8.92   RBC 4.05*   HGB 10.3*   HCT 32.6*      MCV 81*   MCH 25.4*   MCHC 31.6*     CMP:   Recent Labs   Lab 07/06/22  0406   *   CALCIUM 8.6*   ALBUMIN 2.5*   PROT 6.9      K 4.2   CO2 19*      BUN 29*   CREATININE 1.5*   ALKPHOS 62   ALT 16   AST 23   BILITOT 0.3     CRP:   Recent Labs   Lab 07/04/22  0510   CRP 14.5*     ESR: No results for input(s): SEDRATE in the last 168 hours.  Microbiology Results (last 7 days)       ** No results found for the last 168 hours. **          Specimen (24h ago, onward)                None

## 2022-07-07 NOTE — PT/OT/SLP PROGRESS
Physical Therapy Co Treatment    Patient Name:  Saji Castañeda   MRN:  [id number]  *Co treatment of 2 skilled therapists indicated in order to maximize function and safety of Pt.  Recommendations:     Discharge Recommendations:  nursing facility, skilled   Discharge Equipment Recommendations: wheelchair, bedside commode, walker, rolling   Barriers to discharge: None    Assessment:     Saji Castañeda is a 72 y.o. male admitted with a medical diagnosis of Osteomyelitis of left lower extremity.  He presents with the following impairments/functional limitations:  weakness, impaired self care skills, impaired functional mobilty, impaired endurance, gait instability, impaired balance, decreased lower extremity function, decreased safety awareness, decreased coordination, orthopedic precautions. Saji Castañeda would benefit from acute PT intervention to address listed functional deficits, provide patient/caregiver education, reduce fall risk, and maximize (I) and safety with functional mobility.    Pt presents with significant functional mobility deficits and is functioning below baseline. Pt continues to participate well throughout therapy session, displaying high degree of motivation and engagement throughout. Performed 3 sit to stand transfersw with min-modA and performed bed to bedside chair transfer with Karena x 2. Remainder of session focused on therex. Pt will continue to benefit from acute PT services in order to maximize safety and (I) with functional mobility.    After hospital discharge, pt would benefit from SNF to continue addressing therapy impairments.    Rehab Prognosis: Good; patient would benefit from acute skilled PT services to address these deficits and reach maximum level of function.      Plan:     During this hospitalization, patient to be seen 3 x/week to address the identified rehab impairments via gait training, therapeutic activities, therapeutic exercises, neuromuscular re-education and progress  toward the following goals:    · Plan of Care Expires:  07/20/22    This plan of care has been discussed with the patient/caregiver, who was included in its development and is in agreement with the identified goals and treatment plan.     Subjective     Communicated with RN prior to session.  Patient agreeable to participate.     Chief Complaint: none  Patient/Family Comments/goals: to get better  Pt pain prior to session: 0/10  Pt pain following session: 0/10    Objective:     Patient found HOB elevated with telemetry, pulse ox (continuous), pressure relief boots  upon PT entry to room.    General Precautions: Standard, fall, contact   Orthopedic Precautions:LLE partial weight bearing (PWB to L heel for t/f only)   Braces: N/A     Functional Mobility:    Bed Mobility:  · Supine to Sit: Supervision or Set-up Assistance  · Sit to Supine: Supervision or Set-up Assistance  · Scooting anteriorly to EOB to plant feet on floor: Supervision or Set-up Assistance    Transfers:   · Sit to Stand Transfer: Minimal Assistance  from EOB with RW AD   · Performed 3 trials: trials 1 and 2 required Karena; 3rd trial required mod A  · Bed to Chair: Minimal Assistance x 2 with RW AD   · PT providing max verbal and tactile cues for hand/food placement and proper AD use              Balance:  · Static Sit: Independent at EOB x ~5 minutes  · Static Stand: Min Assist with Rolling walker      Therapeutic Activities/Exercises     Pt performed 1 x 10 LAQs    Patient educated on the importance of early mobility to prevent functional decline during hospital stay    Patient was instructed to utilize staff assistance for mobility/transfers.  Patient was educated on PT POC and all questions answered within PT scope of practice.    Patient/caregiver able to verbalize understanding; will follow-up with pt/caregiver during current admit for additional questions/concerns within scope of practice.     White board updated.     AM-PAC 6 CLICK  MOBILITY  Total Score:14     Patient left up in chair with all lines intact and call button in reach.        History/Goals:     PAST MEDICAL HISTORY:  Past Medical History:   Diagnosis Date    Arthritis     legs    Diabetes mellitus     Diabetes mellitus, type 2     Hyperlipidemia     Osteomyelitis        Past Surgical History:   Procedure Laterality Date    ANGIOGRAPHY OF LOWER EXTREMITY N/A 2/3/2021    Procedure: Angiogram Extremity Bilateral;  Surgeon: Ernst Chacko MD;  Location: [facility] OR [address] [place] [address];  Service: Peripheral Vascular;  Laterality: N/A;  7.4 mintues fluoroscopy time  816.15 mGy  170.17 Gycm2    AORTOGRAPHY WITH EXTREMITY RUNOFF Bilateral 2/3/2021    Procedure: AORTOGRAM, WITH EXTREMITY RUNOFF;  Surgeon: Ernst Chacko MD;  Location: [facility] OR [address] [place] [address];  Service: Peripheral Vascular;  Laterality: Bilateral;    DEBRIDEMENT OF FOOT Left 2021    Procedure: DEBRIDEMENT, LEFT HEEL;  Surgeon: Mayra Schroeder DPM;  Location: [facility] OR [address] [place] [address];  Service: Podiatry;  Laterality: Left;    FOOT AMPUTATION  2010    left high midfoot amputation       GOALS:   Multidisciplinary Problems     Physical Therapy Goals        Problem: Physical Therapy    Goal Priority Disciplines Outcome Goal Variances Interventions   Physical Therapy Goal     PT, PT/OT Ongoing, Progressing     Description: Goals to be met by: 22     Patient will increase functional independence with mobility by performin. Supine to sit with Modified [place]  2. Sit to stand transfer with Supervision  3. Bed to chair transfer with Stand-by Assistance using LRAD  4. Gait  x 10 feet with Stand-by Assistance using LRAD                     Time Tracking:     PT Received On: 22  PT Start Time: 1507     PT Stop Time: 1533  PT Total Time (min): 26 min     Billable Minutes: Therapeutic Activity 15 and Neuromuscular Re-education 10      Sonny Madrid, PT  2022

## 2022-07-07 NOTE — PROGRESS NOTES
[facility] - Telemetry Stepdown  Podiatry  Progress Note    Patient Name: Saji Castañeda  MRN: [id number]  Admission Date: 6/15/2022  Hospital Length of Stay: 22 days  Attending Physician: Salma Strauss MD  Primary Care Provider: Mart Escalante MD     Subjective:     Interval History: Hypertensive and hypoxic otherwise. Vitals stable. No new pedal complaints. Dressings clean dry, intact.          Scheduled Meds:   apixaban  10 mg Oral BID    Followed by    [START ON 7/12/2022] apixaban  5 mg Oral BID    aspirin  81 mg Oral Daily    atorvastatin  80 mg Oral Daily    DAPTOmycin (CUBICIN)  IV  8 mg/kg Intravenous Q24H    gabapentin  200 mg Oral BID    hydrALAZINE  25 mg Oral TID    insulin aspart U-100  20 Units Subcutaneous TIDWM    insulin detemir U-100  24 Units Subcutaneous BID    isosorbide dinitrate  10 mg Oral TID    Lactobacillus rhamnosus GG  1 capsule Oral BID    NIFEdipine  90 mg Oral Daily    sodium chloride 0.9%  10 mL Intravenous Q6H    tamsulosin  0.4 mg Oral Daily     Continuous Infusions:  PRN Meds:acetaminophen, albuterol-ipratropium, aluminum-magnesium hydroxide-simethicone, dextrose 10%, dextrose 10%, glucagon (human recombinant), glucose, glucose, hydrALAZINE, insulin aspart U-100, melatonin, naloxone, ondansetron, oxyCODONE-acetaminophen, prochlorperazine, sars-cov-2 (covid-19), sodium chloride 0.9%, Flushing PICC Protocol **AND** sodium chloride 0.9% **AND** sodium chloride 0.9%    Review of Systems   Constitutional:  Negative for activity change, chills, fever and unexpected weight change.   HENT:  Negative for congestion and sore throat.    Respiratory:  Negative for cough, shortness of breath and wheezing.    Cardiovascular:  Negative for chest pain, palpitations and leg swelling.   Gastrointestinal:  Negative for abdominal pain, blood in stool, nausea and vomiting.   Genitourinary:  Negative for dysuria and hematuria.   Musculoskeletal:  Positive for gait problem. Negative for  arthralgias and neck pain.   Skin:  Positive for wound. Negative for color change and rash.   Neurological:  Positive for numbness. Negative for dizziness and seizures.   Psychiatric/Behavioral:  Negative for hallucinations and suicidal ideas.    All other systems reviewed and are negative.  Objective:     Vital Signs (Most Recent):  Temp: 99.2 °F (37.3 °C) (07/07/22 1125)  Pulse: 61 (07/07/22 1125)  Resp: 18 (07/07/22 1125)  BP: (!) 141/63 (07/07/22 1125)  SpO2: (!) 94 % (07/07/22 1125)   Vital Signs (24h Range):  Temp:  [97.8 °F (36.6 °C)-99.2 °F (37.3 °C)] 99.2 °F (37.3 °C)  Pulse:  [47-77] 61  Resp:  [17-21] 18  SpO2:  [93 %-98 %] 94 %  BP: (118-169)/(63-75) 141/63     Weight: 124.3 kg (274 lb 0.5 oz)  Body mass index is 40.47 kg/m².    Foot Exam    General  Orientation: alert and oriented to person, place, and time   Affect: appropriate       Right Foot/Ankle     Inspection and Palpation  Ecchymosis: none  Tenderness: none   Swelling: (BLE edema)  Skin Exam: skin intact;     Neurovascular  Dorsalis pedis: doppler  Posterior tibial: doppler  Saphenous nerve sensation: diminished  Tibial nerve sensation: diminished  Superficial peroneal nerve sensation: diminished  Deep peroneal nerve sensation: diminished  Sural nerve sensation: diminished      Left Foot/Ankle      Inspection and Palpation  Ecchymosis: none  Tenderness: (Tenderness to the posterior heel wound)  Swelling: (significant b/l LE edema)  Skin Exam: skin changes and ulcer; no drainage (resolved)     Neurovascular  Dorsalis pedis: doppler  Posterior tibial: doppler  Saphenous nerve sensation: diminished  Tibial nerve sensation: diminished  Superficial peroneal nerve sensation: diminished  Deep peroneal nerve sensation: diminished  Sural nerve sensation: diminished    Comments  -Left Chopart amputation  --Left posterior heel wound down to subcutaneous tissue with thin soft tissue covering overlying calcaneus. Wound bed viable. Serous drainage now  resolved. Mild tenderness. Periwound maceration improved. Periwound callus.  -Left posterior left partial thickness ulcer with viable wound bed not clinically infected  -Left ankle mild pain with tib-fib squeeze but not with PROM of ankle or end range of ankle.     7/7 7/7 left posterior leg          Laboratory:  Blood Cultures: No results for input(s): LABBLOO in the last 48 hours.  CBC:   Recent Labs   Lab 07/06/22  0406   WBC 8.92   RBC 4.05*   HGB 10.3*   HCT 32.6*      MCV 81*   MCH 25.4*   MCHC 31.6*     CMP:   Recent Labs   Lab 07/06/22  0406   *   CALCIUM 8.6*   ALBUMIN 2.5*   PROT 6.9      K 4.2   CO2 19*      BUN 29*   CREATININE 1.5*   ALKPHOS 62   ALT 16   AST 23   BILITOT 0.3     CRP:   Recent Labs   Lab 07/04/22  0510   CRP 14.5*     ESR: No results for input(s): SEDRATE in the last 168 hours.  Microbiology Results (last 7 days)       ** No results found for the last 168 hours. **          Specimen (24h ago, onward)                None              Assessment/Plan:     * Osteomyelitis of left lower extremity  Assessment: IDSA moderate to severe diabetic foot infection with left calcaneal osteomyelitis and overlying wound, possible significant PAD. Wound culture + for GBS and Staph aureus, bone culture is + for Staphylococcus epidermidis . L arterial US + for significant stenoses, L MARIO 1.43.     Plan:  -No surgical intervention planned by podiatry. Other services have recommended major amputation, patient not amenable at this time. Attempting limb salvage with wound care and antibiotics.   -Antibiotic plan per ID.  -Appreciate vascular surgery recs: patient will eventually need major amputation.   -Heel protector boot LLE at all times while in bed or chair.   -WB to LLE for transfers only.   -Dressing changes by nursing.   -Podiatry will follow.   DC Instructions: Please order ambulatory referral to podiatry ([facility]) for followup within 2 weeks of discharge. WB LLE for  transfers only. Dressing changes 3x/week: cleanse left foot wound and left posterior leg wound with saline, apply hydrofera blue ready to both, wrap foot and lower leg with cast padding and coban.     Foot pain, left  See rest of plan    Peripheral arterial disease  Per vascular surgery    Type 2 diabetes, uncontrolled, with neuropathy  See rest of plan      Herminia Lauren DPM PGY-1  Podiatric Medicine & Surgery  [facility]  Secure Chat Preferred  Mobile: [phone]  Pager: [phone]

## 2022-07-07 NOTE — ASSESSMENT & PLAN NOTE
Assessment: IDSA moderate to severe diabetic foot infection with left calcaneal osteomyelitis and overlying wound, possible significant PAD. Wound culture + for GBS and Staph aureus, bone culture is + for Staphylococcus epidermidis . L arterial US + for significant stenoses, L MARIO 1.43.     Plan:  -No surgical intervention planned by podiatry. Other services have recommended major amputation, patient not amenable at this time. Attempting limb salvage with wound care and antibiotics.   -Antibiotic plan per ID.  -Appreciate vascular surgery recs: patient will eventually need major amputation.   -Heel protector boot LLE at all times while in bed or chair.   -WB to LLE for transfers only.   -Dressing changes by nursing.   -Podiatry will follow.   DC Instructions: Please order ambulatory referral to podiatry ([facility]) for followup within 2 weeks of discharge. WB LLE for transfers only. Dressing changes 3x/week: cleanse left foot wound and left posterior leg wound with saline, apply hydrofera blue ready to both, wrap foot and lower leg with cast padding and coban.

## 2022-07-07 NOTE — PT/OT/SLP PROGRESS
"Occupational Therapy  Co- Treatment    Name: Saji Castañeda  MRN: [id number]  Admitting Diagnosis:  Osteomyelitis of left lower extremity        Co-evaluation/treatment performed due to patient's multiple deficits requiring two skilled therapists to appropriately and safely assess patient's strength and endurance while facilitating functional tasks in addition to accommodating for patient's activity tolerance.       Recommendations:     Discharge Recommendations: nursing facility, skilled  Discharge Equipment Recommendations:  wheelchair, bedside commode, walker, rolling  Barriers to discharge:  Decreased caregiver support    Assessment:     Saji Castañeda is a 72 y.o. male with a medical diagnosis of Osteomyelitis of left lower extremity.  Pt completed therapy session with good motivation, required reminders for safe body mechanics throughout session. Pt completed bed mobility with supervision, and 3 sit to stand transfers with min A, using RW.  Pt completed step transfer from bed to chair with RW, min A with maximal amount of cues.     He presents with performance deficits affecting function are weakness, impaired self care skills, impaired functional mobilty, gait instability, impaired balance, decreased upper extremity function, decreased lower extremity function, decreased safety awareness, decreased ROM, edema.     Rehab Prognosis:  Good; patient would benefit from acute skilled OT services to address these deficits and reach maximum level of function.       Plan:     Patient to be seen 3 x/week to address the above listed problems via self-care/home management, therapeutic activities, therapeutic exercises, neuromuscular re-education  · Plan of Care Expires: 07/21/22  · Plan of Care Reviewed with: patient    Subjective     "Sure I can get up"     Pain/Comfort:  · Pain Rating 1: 0/10  · Pain Rating Post-Intervention 1: 0/10    Objective:     Communicated with: BEREKET Capone prior to session.  Patient found supine " with telemetry, pulse ox (continuous), pressure relief boots upon OT entry to room.    General Precautions: Standard, fall, contact   Orthopedic Precautions:LLE partial weight bearing (PWB to L heel for t/f only)   Braces: N/A  Respiratory Status: Room air     Occupational Performance:     Bed Mobility:    · Patient completed Rolling/Turning to Left with  supervision  · Patient completed Rolling/Turning to Right with supervision  · Patient completed Scooting/Bridging with supervision  · Patient completed Supine to Sit with supervision     Functional Mobility/Transfers:  · Patient completed Sit <> Stand Transfers across 3 trials  · 1st trial: minimum assistance with RW, 2min  · 2nd trial: min A with RW, 1.5 min while this therapist completed toileting ADLs  · 3rd trial: mod A with RW   · Patient completed Bed <> Chair Transfer using Stand Pivot technique with minimum assistance with rolling walker, max cues for body mechanics and safety     Activities of Daily Living:  · Upper Body Dressing: minimum assistance affixing hospital gown tie and managing gown   · Toileting: maximal assistance completing posterior natalie-care     Therapeutic Exercises:   While upright in bedside chair, pt completed BUE in all planes within ability, 10reps x 1 time      AMPAC 6 Click ADL: 17    Treatment & Education:  This therapist ordered BSC for pt to increase mobility/ADL       Pt instructed to complete TherEx 10x, 3 times a day   POC was dicussed with patient/caregiver, who was included in its development and is in agreement with the identified goals and treatment plan.    Patient and family aware of patient's deficits and therapy progression.    Time provided for therapeutic counseling and discussion of health disposition.    Educated on importance of EOB/OOB mobility, maintaining routine, sitting up in chair, and maximizing independence with ADLs during admission    Pt completed ADLs and functional mobility for treatment session  as noted above    Pt/caregiver verbalized understanding and expressed no further concerns/questions.      Patient left up in chair with all lines intact, call button in reach and RN notifiedEducation:      GOALS:   Multidisciplinary Problems     Occupational Therapy Goals        Problem: Occupational Therapy    Goal Priority Disciplines Outcome Interventions   Occupational Therapy Goal     OT, PT/OT Ongoing, Progressing    Description: Goals to be met by: 07-02-22     Patient will increase functional independence with ADLs by performing:    UE Dressing with Set-up Assistance.  LE Dressing with Minimal Assistance.  Grooming while seated with Set-up Assistance.  Toileting from bedside commode with Stand-by Assistance for hygiene and clothing management.   Supine to sit with Stand-by Assistance.  Stand pivot transfers with Stand-by Assistance with RW and PWB in left heel only  Toilet transfer to bedside commode with Stand-by Assistance.  Pt. To perform scoot pivot transfer with Supervision to and from the wheelchair                     Time Tracking:     OT Date of Treatment: 07/07/22  OT Start Time: 1508  OT Stop Time: 1533  OT Total Time (min): 25 min    Billable Minutes:Therapeutic Activity 17  Therapeutic Exercise 8    OT/KATHLEEN: OT          7/7/2022

## 2022-07-07 NOTE — SUBJECTIVE & OBJECTIVE
Telemedicine  This service was provided by Virtual Visit.    Patient was transferred to [facility] on:  6/18/2022    Chief Complaint   Patient presents with    Wound Check     Sent from podiatry and stated in pain. L toes amputated     The patient location is: 8092/8092 A   Admitted 6/15/2022  2:10 PM  Present with the patient at the time of the telemed/virtual assessment: Telepresenter    Interval History / Events Overnight:   The patient is able to provide adequate history. Additional history was obtained from past medical records. No significant events reported by Nursing.   Patient complains of nothing specific. Symptoms have been unchanged since yesterday. Associated symptoms include: fatigue. Symptoms are stable.     Lab test(s) reviewed:  sCr stable.    Review of Systems   Constitutional:  Negative for fever.   Respiratory:  Negative for shortness of breath.    Gastrointestinal:  Negative for diarrhea.     Objective:     Vital Signs (Most Recent):  Temp: 97.8 °F (36.6 °C) (07/07/22 0725)  Pulse: 66 (07/07/22 0725)  Resp: 18 (07/07/22 0725)  BP: (!) 140/67 (07/07/22 0725)  SpO2: (!) 94 % (07/07/22 0725)   Vital Signs (24h Range):  Temp:  [97.8 °F (36.6 °C)-99 °F (37.2 °C)] 97.8 °F (36.6 °C)  Pulse:  [41-77] 66  Resp:  [17-21] 18  SpO2:  [91 %-98 %] 94 %  BP: (118-169)/() 140/67     Weight: 124.3 kg (274 lb 0.5 oz)  Body mass index is 40.47 kg/m².    Intake/Output Summary (Last 24 hours) at 7/7/2022 1018  Last data filed at 7/7/2022 0800  Gross per 24 hour   Intake 1385 ml   Output 1450 ml   Net -65 ml        Physical Exam  Constitutional:       General: He is not in acute distress.     Appearance: Normal appearance.   Eyes:      General: Lids are normal. No scleral icterus.        Right eye: No discharge.         Left eye: No discharge.      Conjunctiva/sclera: Conjunctivae normal.   Neck:      Trachea: Phonation normal.   Cardiovascular:      Rate and Rhythm: Bradycardia present.       Comments: Monitor / Vital signs reviewed at time of visit  Pulmonary:      Effort: Pulmonary effort is normal. No tachypnea, accessory muscle usage or respiratory distress.   Abdominal:      General: There is no distension.   Musculoskeletal:      Left foot: Deformity (s/p partial amputation) present.   Skin:     Coloration: Skin is not cyanotic.   Neurological:      Mental Status: He is alert. He is not disoriented.   Psychiatric:         Attention and Perception: Attention normal.         Behavior: Behavior is cooperative.       Significant Labs:   Recent Labs   Lab 06/16/22  1100   HGBA1C 9.5*       Recent Labs   Lab 07/06/22  1542 07/06/22  2125 07/07/22  0724   POCTGLUCOSE 154* 104 155*       Recent Labs   Lab 07/02/22  0318 07/04/22  0510 07/06/22  0406   WBC 8.10 8.27 8.92   HGB 10.0* 9.9* 10.3*   HCT 32.3* 32.7* 32.6*    364 324       Recent Labs   Lab 07/02/22 0318 07/04/22  0510 07/06/22  0406   GRAN 57.6  4.7 64.1  5.3 58.1  5.2   LYMPH 21.7  1.8 16.3*  1.4 20.5  1.8   MONO 10.5  0.9 8.7  0.7 9.2  0.8   EOS 0.7* 0.8* 1.0*       Recent Labs   Lab 07/01/22  0441 07/02/22 0318 07/02/22  1133 07/04/22  0510 07/06/22  0406    137 136 136 138   K 4.4 5.4* 4.0 4.2 4.2    107 105 106 108   CO2 24 20* 25 22* 19*   BUN 27* 28* 27* 30* 29*   CREATININE 1.4 1.4 1.4 1.5* 1.5*   * 160* 211* 261* 147*   CALCIUM 9.5 8.5* 9.1 8.8 8.6*   ALBUMIN 2.7* 2.6*  --  2.5* 2.5*   MG  --   --  1.9 1.8  --    PHOS 4.0  --  3.2 3.5  --        Recent Labs   Lab 07/02/22 0318 07/04/22  0510 07/06/22  0406   ALKPHOS 65 64 62   ALT 13 15 16   AST 18 18 23   PROT 6.6 7.0 6.9   BILITOT 0.3 0.3 0.3   CRP  --  14.5*  --        Recent Labs   Lab 02/23/22  1407 06/15/22  1633   LACTATE  --  1.4   SEDRATE >120* >120*       SARS-CoV2 (COVID-19) Qualitative PCR (no units)   Date Value   03/29/2021 Not Detected   03/15/2021 Not Detected   03/02/2021 Not Detected   02/22/2021 Not Detected   01/29/2021 Not  Detected     SARS-CoV-2 RNA, Amplification, Qual (no units)   Date Value   02/03/2021 Negative     POC Rapid COVID (no units)   Date Value   06/15/2022 Negative   02/18/2021 Negative   12/08/2020 Negative     US Upper Extremity Veins Right  Narrative: EXAMINATION:  US UPPER EXTREMITY VEINS RIGHT    CLINICAL HISTORY:  edema to RUE;    TECHNIQUE:  Duplex and color flow Doppler evaluation and dynamic compression was performed of the right upper extremity veins.    COMPARISON:  None    FINDINGS:  Right PICC line.    Central veins: The right subclavian vein and right axillary vein are thrombosed and noncompressible.  The internal jugular vein is patent and free of thrombus.    Arm veins: The right basilic vein and right cephalic vein are thrombosed and noncompressible.  The brachial vein is patent and compressible.    Contralateral subclavian/internal jugular veins: The left subclavian and internal jugular veins are patent and free of thrombus.    There is a PICC catheter, partially visualized within the right subclavian and right basilic vein.  Impression: Thrombosis of the right subclavian, axillary, basilic, and cephalic veins.    This critical information above was relayed by Raegan Melvin MD by telephone to Keerthi Mayorga MD on 07/05/2022 at 18:07.    Electronically signed by resident: Raegan Melvin  Date:    07/05/2022  Time:    17:59    Electronically signed by: Milton Dove MD  Date:    07/05/2022  Time:    18:32      Labs and Imaging within the last 24 hours listed above were reviewed.       Diet: Diet diabetic  Diet diabetic [facility]; 2000 Calorie; Cardiac (Low Na/Chol); Isolation Tray - Styrofoam  Significant LDAs:   IV Access Type: Peripheral  Urinary Catheter Indication if present: Patient Does Not Have Urinary Catheter  Other Lines/Tubes/Drains:    HIGH RISK CONDITION(S):   Patient has a condition that poses threat to life and bodily function: limb ischemia likely to prevent wound healing      Goals of  Care:    Previous admission:  2/17/21  Likely prognosis:  Fair  Code Status: Full Code  Comfort Only: No  Hospice: No  Goals at discharge: remain at home, with physician follow-up    Discharge Planning:  OXANA: 7/6/2022     Code Status: Full Code   Is the patient medically ready for discharge?: Yes    Reason for patient still in hospital (select all that apply): Patient trending condition, Treatment, and Pending disposition  Discharge Plan A: Skilled Nursing Facility

## 2022-07-07 NOTE — PLAN OF CARE
Per Carlyn with [facility], [facility] is offering a peer to peer. Attempted to contact Seun ([phone]) at [facility] and left voicemail.    Spoke with Joya ([phone] ext [phone]) who confirmed [facility] is offering a pre-Peer to Peer. Dr. Strauss agreeable for a PtP at 3:30 PM today. Per Joya, Dr. Strauss must call [phone] at 3:30 PM today for her PtP with Dr. Blanc. Updated MD.    2:28 PM  Spoke with Dr. Strauss who reports PtP has been completed. LTAC denied, but per Dr. Blanc, insurance would be willing to work on dapto carve out for SNF.    Spoke with Brandy ([phone]) at [facility] and updated on above, also sent updated clinicals in [facility]. Brandy reports they will review updated clinicals and begin working on SNF auth/carve out.    Toshia Dennis, LORNA  [facility]  Ext. [phone]

## 2022-07-07 NOTE — PLAN OF CARE
Pt tolerated therapy session well, with good motivation.     Problem: Occupational Therapy  Goal: Occupational Therapy Goal  Description: Goals to be met by: 07-02-22     Patient will increase functional independence with ADLs by performing:    UE Dressing with Set-up Assistance.  LE Dressing with Minimal Assistance.  Grooming while seated with Set-up Assistance.  Toileting from bedside commode with Stand-by Assistance for hygiene and clothing management.   Supine to sit with Stand-by Assistance. - MET  UPDATED: 7/7/22 - Supine to sit with mod I  Stand pivot transfers with Stand-by Assistance with RW and PWB in left heel only  Toilet transfer to bedside commode with Stand-by Assistance.  Pt. To perform scoot pivot transfer with Supervision to and from the wheelchair    Outcome: Ongoing, Progressing

## 2022-07-08 VITALS
SYSTOLIC BLOOD PRESSURE: 186 MMHG | HEART RATE: 44 BPM | BODY MASS INDEX: 40.59 KG/M2 | HEIGHT: 69 IN | RESPIRATION RATE: 18 BRPM | TEMPERATURE: 98 F | DIASTOLIC BLOOD PRESSURE: 86 MMHG | WEIGHT: 274.06 LBS | OXYGEN SATURATION: 96 %

## 2022-07-08 LAB
ALBUMIN SERPL BCP-MCNC: 2.6 G/DL (ref 3.5–5.2)
ALP SERPL-CCNC: 69 U/L (ref 55–135)
ALT SERPL W/O P-5'-P-CCNC: 22 U/L (ref 10–44)
ANION GAP SERPL CALC-SCNC: 9 MMOL/L (ref 8–16)
AST SERPL-CCNC: 29 U/L (ref 10–40)
BASOPHILS # BLD AUTO: 0.05 K/UL (ref 0–0.2)
BASOPHILS NFR BLD: 0.7 % (ref 0–1.9)
BILIRUB SERPL-MCNC: 0.3 MG/DL (ref 0.1–1)
BUN SERPL-MCNC: 26 MG/DL (ref 8–23)
CALCIUM SERPL-MCNC: 9.1 MG/DL (ref 8.7–10.5)
CHLORIDE SERPL-SCNC: 109 MMOL/L (ref 95–110)
CO2 SERPL-SCNC: 20 MMOL/L (ref 23–29)
CREAT SERPL-MCNC: 1.4 MG/DL (ref 0.5–1.4)
DIFFERENTIAL METHOD: ABNORMAL
EOSINOPHIL # BLD AUTO: 0.8 K/UL (ref 0–0.5)
EOSINOPHIL NFR BLD: 11.4 % (ref 0–8)
ERYTHROCYTE [DISTWIDTH] IN BLOOD BY AUTOMATED COUNT: 17.5 % (ref 11.5–14.5)
EST. GFR  (AFRICAN AMERICAN): 57.2 ML/MIN/1.73 M^2
EST. GFR  (NON AFRICAN AMERICAN): 49.5 ML/MIN/1.73 M^2
GLUCOSE SERPL-MCNC: 99 MG/DL (ref 70–110)
HCT VFR BLD AUTO: 35.3 % (ref 40–54)
HGB BLD-MCNC: 10.8 G/DL (ref 14–18)
IMM GRANULOCYTES # BLD AUTO: 0.01 K/UL (ref 0–0.04)
IMM GRANULOCYTES NFR BLD AUTO: 0.1 % (ref 0–0.5)
LYMPHOCYTES # BLD AUTO: 1.9 K/UL (ref 1–4.8)
LYMPHOCYTES NFR BLD: 27.8 % (ref 18–48)
MCH RBC QN AUTO: 25.3 PG (ref 27–31)
MCHC RBC AUTO-ENTMCNC: 30.6 G/DL (ref 32–36)
MCV RBC AUTO: 83 FL (ref 82–98)
MONOCYTES # BLD AUTO: 0.7 K/UL (ref 0.3–1)
MONOCYTES NFR BLD: 9.9 % (ref 4–15)
NEUTROPHILS # BLD AUTO: 3.3 K/UL (ref 1.8–7.7)
NEUTROPHILS NFR BLD: 50.1 % (ref 38–73)
NRBC BLD-RTO: 0 /100 WBC
PLATELET # BLD AUTO: 282 K/UL (ref 150–450)
PMV BLD AUTO: 10.2 FL (ref 9.2–12.9)
POCT GLUCOSE: 107 MG/DL (ref 70–110)
POCT GLUCOSE: 179 MG/DL (ref 70–110)
POTASSIUM SERPL-SCNC: 4.3 MMOL/L (ref 3.5–5.1)
PROT SERPL-MCNC: 7.5 G/DL (ref 6–8.4)
RBC # BLD AUTO: 4.27 M/UL (ref 4.6–6.2)
SODIUM SERPL-SCNC: 138 MMOL/L (ref 136–145)
WBC # BLD AUTO: 6.68 K/UL (ref 3.9–12.7)

## 2022-07-08 PROCEDURE — 93005 ELECTROCARDIOGRAM TRACING: CPT

## 2022-07-08 PROCEDURE — 36410 VNPNXR 3YR/> PHY/QHP DX/THER: CPT

## 2022-07-08 PROCEDURE — 97535 SELF CARE MNGMENT TRAINING: CPT

## 2022-07-08 PROCEDURE — 93010 ELECTROCARDIOGRAM REPORT: CPT | Mod: ,,, | Performed by: INTERNAL MEDICINE

## 2022-07-08 PROCEDURE — 85025 COMPLETE CBC W/AUTO DIFF WBC: CPT | Performed by: INTERNAL MEDICINE

## 2022-07-08 PROCEDURE — 99239 HOSP IP/OBS DSCHRG MGMT >30: CPT | Mod: 95,,, | Performed by: INTERNAL MEDICINE

## 2022-07-08 PROCEDURE — A4216 STERILE WATER/SALINE, 10 ML: HCPCS | Performed by: INTERNAL MEDICINE

## 2022-07-08 PROCEDURE — 1111F DSCHRG MED/CURRENT MED MERGE: CPT | Mod: 95,CPTII,, | Performed by: INTERNAL MEDICINE

## 2022-07-08 PROCEDURE — 25000003 PHARM REV CODE 250: Performed by: HOSPITALIST

## 2022-07-08 PROCEDURE — 76937 US GUIDE VASCULAR ACCESS: CPT

## 2022-07-08 PROCEDURE — 25000003 PHARM REV CODE 250: Performed by: INTERNAL MEDICINE

## 2022-07-08 PROCEDURE — 36415 COLL VENOUS BLD VENIPUNCTURE: CPT | Performed by: INTERNAL MEDICINE

## 2022-07-08 PROCEDURE — 1111F PR DISCHARGE MEDS RECONCILED W/ CURRENT OUTPATIENT MED LIST: ICD-10-PCS | Mod: 95,CPTII,, | Performed by: INTERNAL MEDICINE

## 2022-07-08 PROCEDURE — 80053 COMPREHEN METABOLIC PANEL: CPT | Performed by: INTERNAL MEDICINE

## 2022-07-08 PROCEDURE — 93010 EKG 12-LEAD: ICD-10-PCS | Mod: ,,, | Performed by: INTERNAL MEDICINE

## 2022-07-08 PROCEDURE — C1751 CATH, INF, PER/CENT/MIDLINE: HCPCS

## 2022-07-08 PROCEDURE — 99239 PR HOSPITAL DISCHARGE DAY,>30 MIN: ICD-10-PCS | Mod: 95,,, | Performed by: INTERNAL MEDICINE

## 2022-07-08 RX ORDER — INSULIN GLARGINE 100 [IU]/ML
45 INJECTION, SOLUTION SUBCUTANEOUS NIGHTLY
Refills: 0 | Status: ON HOLD
Start: 2022-07-08 | End: 2024-03-17

## 2022-07-08 RX ORDER — ISOSORBIDE DINITRATE 10 MG/1
10 TABLET ORAL 3 TIMES DAILY
Qty: 90 TABLET | Refills: 11 | Status: ON HOLD
Start: 2022-07-08 | End: 2024-03-17

## 2022-07-08 RX ORDER — HYDRALAZINE HYDROCHLORIDE 25 MG/1
25 TABLET, FILM COATED ORAL 3 TIMES DAILY
Qty: 90 TABLET | Refills: 11
Start: 2022-07-08 | End: 2023-10-02

## 2022-07-08 RX ADMIN — TAMSULOSIN HYDROCHLORIDE 0.4 MG: 0.4 CAPSULE ORAL at 09:07

## 2022-07-08 RX ADMIN — ISOSORBIDE DINITRATE 10 MG: 10 TABLET ORAL at 03:07

## 2022-07-08 RX ADMIN — Medication 10 ML: at 12:07

## 2022-07-08 RX ADMIN — NIFEDIPINE 90 MG: 30 TABLET, FILM COATED, EXTENDED RELEASE ORAL at 10:07

## 2022-07-08 RX ADMIN — Medication 10 ML: at 01:07

## 2022-07-08 RX ADMIN — HYDRALAZINE HYDROCHLORIDE 25 MG: 25 TABLET, FILM COATED ORAL at 09:07

## 2022-07-08 RX ADMIN — GABAPENTIN 200 MG: 100 CAPSULE ORAL at 09:07

## 2022-07-08 RX ADMIN — ISOSORBIDE DINITRATE 10 MG: 10 TABLET ORAL at 10:07

## 2022-07-08 RX ADMIN — HYDRALAZINE HYDROCHLORIDE 25 MG: 25 TABLET, FILM COATED ORAL at 03:07

## 2022-07-08 RX ADMIN — ATORVASTATIN CALCIUM 80 MG: 20 TABLET, FILM COATED ORAL at 09:07

## 2022-07-08 RX ADMIN — OXYCODONE HYDROCHLORIDE AND ACETAMINOPHEN 1 TABLET: 5; 325 TABLET ORAL at 03:07

## 2022-07-08 RX ADMIN — APIXABAN 10 MG: 5 TABLET, FILM COATED ORAL at 09:07

## 2022-07-08 RX ADMIN — Medication 1 CAPSULE: at 09:07

## 2022-07-08 RX ADMIN — ASPIRIN 81 MG: 81 TABLET, COATED ORAL at 09:07

## 2022-07-08 RX ADMIN — Medication 10 ML: at 06:07

## 2022-07-08 RX ADMIN — INSULIN ASPART 20 UNITS: 100 INJECTION, SOLUTION INTRAVENOUS; SUBCUTANEOUS at 01:07

## 2022-07-08 NOTE — ASSESSMENT & PLAN NOTE
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 S/p bone biopsy.  -ID consulted. Cultures growing Staph aureus and GBS   - L foot wound anaerobic culture growing Prevotella and Porphyromonas Somerae. PO Flagyl added.    - L foot bone culture growing Staph species, continued IV ceftriaxone and vancomycin (per Pharmacy dosing).  - with acute renal insufficiency, vancomycin changed to daptomycin and ceftriaxone discontinued as not needed based on current cultures. Continuing Flagyl  -Continuing daptomycin (patient's renal function cannot tolerate vancomycin) and Flagyl; Estimated end date of daptomycin: 7/28/22. Estimated end date of Flagyl: 7/4/22

## 2022-07-08 NOTE — CONSULTS
SHELDON placed 18g, 10 cm Midline in left brachial vein using u/s guidance.  Max dwell date 8/6/2022.  Lot # [id number].    MIDLINE inserted for IV abx: Daptomycin est end date 7/28/2022

## 2022-07-08 NOTE — ASSESSMENT & PLAN NOTE
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 S/p bone biopsy.  -ID consulted. Cultures growing Staph aureus and GBS   - L foot wound anaerobic culture growing Prevotella and Porphyromonas Somerae. PO Flagyl added.    - L foot bone culture growing Staph species, continued IV ceftriaxone and vancomycin (per Pharmacy dosing).  - with acute renal insufficiency, vancomycin changed to daptomycin and ceftriaxone discontinued as not needed based on current cultures. Continuing Flagyl. Estimated end date of Flagyl: 7/4/22  -Continuing daptomycin (patient's renal function cannot tolerate vancomycin) and Flagyl (completed); Estimated end date of daptomycin: 7/28/22.

## 2022-07-08 NOTE — ASSESSMENT & PLAN NOTE
Involving the right subclavian, axillary, basilic, and cephalic veins.   -started apixaban therapy and removed PICC line

## 2022-07-08 NOTE — NURSING
RRT was called for EKG. Unable to get to the right person. RN call [phone] but nobody answered. RN got connected to the RRT on the 3rd floor. Message was given. EKG has not done. Will continue to monitor   High Dose Vitamin A Counseling: Side effects reviewed, pt to contact office should one occur.

## 2022-07-08 NOTE — NURSING
Patient's HR is still at 40s. And /76. Dr Strauss notified. Isosorbide dinitrate and nifedipine were questioned if they were the cause.  MD said it was fine if patient took them. Medications given. Will continue to monitor

## 2022-07-08 NOTE — NURSING
Patient is AAOX4, VSS. NAD. Discharged instructions reviewed and explained. Patient verbalized understanding. PIV removed. The new midline remains. Awaiting for being picked up at 1600.

## 2022-07-08 NOTE — ASSESSMENT & PLAN NOTE
-PVD contributing to current non-healing foot wound. Arterial US shows LLE atherosclerotic disease, areas of high-grade stenosis within the L popliteal artery  -Continue home statin, ASA  -Vascular Surgery consulted - patient considering surgery (BKA); seems reluctant primarily due to the time he expects it to take to obtain a prosthetic limb. Wants to try conservative management with antibiotics.  -Plan for SNF for IV antibiotics, PT/OT  - Follow up with Podiatry, ID and Vascular Surgery as OP

## 2022-07-08 NOTE — DISCHARGE SUMMARY
[facility] - Telemetry [facility] Medicine  Telemedicine Discharge Summary      Patient Name: Saji Castañeda  MRN: [id number]  Patient Class: IP- Inpatient  Admission Date: 6/15/2022  Hospital Length of Stay: 23 days  Discharge Date and Time:  07/08/2022 1:41 PM  Attending Physician: Laly Strauss MD   Discharging Provider: Laly Strauss MD  Primary Care Provider: Mart Escalante MD      HPI:   71 y.o. M with PMHx DM2 and PVD with chronic diabetic foot wounds s/p L sided BKA, HTN, asymptomatic bradycardia, CKD 3 and HLD who presented to the ED for worsening R foot pain and drainage. Pt. With known chronic ulceration/osteomylitis to L heel and ID has recommended BKA for definitive therapy, but the patient reports that he has not been interested in amputation. Today, he had his usual f/u appointment with ID, and the patient was referred to the ED for imaging with bone biopsy and potential abx because of worsening drainage and pain. Pt. Denies any current fevers, chills, nausea, or other systemic signs of infection.      * No surgery found *      Hospital Course:   Patient admitted to hospital medicine. Podiatry and vascular surgery consulted. Patient underwent bone biopsy and culture. Infectious disease consulted.      See Problems listed below for addition details of this hospital stay.    Goals of Care Treatment Preferences:  Code Status: Full Code      Consults:   Consults (From admission, onward)        Status Ordering Provider     Inpatient consult to PICC team (NIAS)  Once        Provider:  (Not yet assigned)    Ordered LALY STRAUSS     Inpatient consult to PICC team ([facility]AS)  Once        Provider:  (Not yet assigned)    Completed LALY STRAUSS     Hospital Medicine PharmD Consult  Once        Provider:  (Not yet assigned)    Completed LALY STRAUSS     Inpatient virtual consult to [facility] Medicine  Once        Provider:  (Not yet assigned)    Completed JG MONTERO      "Pharmacy to dose Vancomycin consult  Once        Provider:  (Not yet assigned)   "And" Linked Group Details    Completed JG MONTERO     Inpatient consult to Infectious Diseases  Once        Provider:  (Not yet assigned)    Completed JG MEJIA     Inpatient consult to Vascular Surgery  Once        Provider:  (Not yet assigned)    Completed ARIA DELGADO     Inpatient consult to Podiatry  Once        Provider:  (Not yet assigned)    Completed LAKIA KILLIAN          * Osteomyelitis of left lower extremity  -With acute on chronic osteomyleitis of LLE including heel with purulent foul smelling drainage  -Podiatry consulted, Not septic appearing. S/p bone biopsy.  -ID consulted. Cultures growing Staph aureus and GBS   - L foot wound anaerobic culture growing Prevotella and Porphyromonas Somerae. PO Flagyl added.    - L foot bone culture growing Staph species, continued IV ceftriaxone and vancomycin (per Pharmacy dosing).  - with acute renal insufficiency, vancomycin changed to daptomycin and ceftriaxone discontinued as not needed based on current cultures. Continuing Flagyl. Estimated end date of Flagyl: 7/4/22  -Continuing daptomycin (patient's renal function cannot tolerate vancomycin) and Flagyl (completed); Estimated end date of daptomycin: 7/28/22.     Acute deep vein thrombosis (DVT) of right upper extremity   Involving the right subclavian, axillary, basilic, and cephalic veins.   -started apixaban therapy and removed PICC line    Acute renal insufficiency  Estimated Creatinine Clearance: 61.2 mL/min (based on SCr of 1.4 mg/dL).  Per med history, patient no longer taking ARB.  sCr up slightly and vanco level > 20.  Discontinued ARB, holding diuretic for now. Trial of gentle hydration ineffective.  Discontinued vancomycin.  -variable sCr but near baseline    Peripheral arterial disease  -PVD contributing to current non-healing foot wound. Arterial US shows LLE atherosclerotic disease, " areas of high-grade stenosis within the L popliteal artery  -Continue home statin, ASA  -Vascular Surgery consulted - patient considering surgery (BKA); seems reluctant primarily due to the time he expects it to take to obtain a prosthetic limb. Wants to try conservative management with antibiotics.  -Plan for SNF for IV antibiotics, PT/OT  - Follow up with Podiatry, ID and Vascular Surgery as OP    Asymptomatic Mobitz (type) I (Wenckebach's) atrioventricular block  -Bradycardic in ED but asymptomatic, chronic issue  -Repeat EKG 6/19:  BPM 44 Normal sinus rhythm - AV block, 2:1 - Nonspecific T wave abnormality   -avoid BB and chronotropic CCB  -Cardiology reviewed with no intervention recommended; monitor electrolytes    Chronic kidney disease, stage III (moderate)  -sCr baseline 1.1  -Continue to monitor  -sCr remained > baseline; near to baseline 6/26  -sCr 1.4 on 6/29; HCTZ was resumed on 6/27 for hypertension but stopped on 6/30 when sCr elevated; discontinued    Type 2 diabetes, uncontrolled, with neuropathy  -Last A1c 2020, repeat > 9%. Per patient, taking 68 units Lantus QHS along with metformin  mg and glipizide 10 mg daily (Possibly, he is not sure)  -Per Pharm med history: patient is no longer taking insulin  -Mildly hypoglycemic on admit with BG 63  -decreased dose of basal insulin to 15 units Levemir, Titrate up as needed. Added prandial insulin 6/19  -worsening hyperglycemia as oral agents wash out. Levemir increased to BID and aspart increased with meals.  - doses adjusted 6/21; increased for 6/23, and again for 6/25  - some hyperglycemia due to dietary indiscretion; decreased basal insulin 6/27  - BGs controlled when patient adheres to diet (no regular sodas)  - Lantus 45 units QHS with aspart 20 units TIDWM    Essential hypertension  Continued losartan 100mg and HCTZ 25 mg, with hydralazine 25 mg PO q8h PRN  Added nifedipine 6/18  Consulted PharmD for med history: patient reports no longer  taking isosorbide-hydralazine, losartan, and losartan-HCTZ  Discontinued ARB, held HCTZ due to increasing sCr. Continued nifedipine for BP control.  Resumed diuretic when renal insufficiency improved; but did not tolerate so stopped  Hydralazine dose changed multiple times; isordil added to hydralazine on 7/2      Final Active Diagnoses:    Diagnosis Date Noted POA    PRINCIPAL PROBLEM:  Osteomyelitis of left lower extremity [M86.9] 02/18/2021 Yes    Acute deep vein thrombosis (DVT) of right upper extremity [I82.621] 07/06/2022 No    Acute renal insufficiency [N28.9] 06/20/2022 No    Foot pain, left [M79.672]  Yes    Peripheral arterial disease [I73.9] 12/10/2020 Yes     Chronic    Asymptomatic Mobitz (type) I (Wenckebach's) atrioventricular block [I44.1] 12/06/2019 Yes    Chronic kidney disease, stage III (moderate) [N18.30] 10/15/2018 Yes    Type 2 diabetes, uncontrolled, with neuropathy [E11.40, E11.65] 09/23/2014 Yes     Chronic    Essential hypertension [I10] 08/05/2014 Yes     Chronic      Problems Resolved During this Admission:       Discharged Condition: stable    Disposition: Skilled Nursing Facility    Follow Up:   Follow-up Information     Mart Escalante MD. Schedule an appointment as soon as possible for a visit in 3 week(s).    Specialty: Family Medicine  Why: For discharge from hospital follow up  [facility] information:  [address] [facility] [id number] [phone]             MOY Goel Schedule an appointment as soon as possible for a visit in 2 week(s).    Specialty: Infectious Diseases  Why: For discharge from hospital follow up, As previously planned  Contact information:  [address] [facility] [id number] [phone]             Mayra Schroeder DPM. Go in 4 week(s).    Specialty: Podiatry  Why: As Scheduled, For discharge from hospital follow up, Wound check  Contact information:  [address] [facility] [id number] [phone]                        Future Appointments   Date Time Provider Department Center   7/13/2022  1:00 PM Mayra Schroeder DPM [facility] POD [address]   7/18/2022  1:00 PM Anuja Farrell DO [facility] WOUND [facility]   7/20/2022  1:00 PM Mayra Schroeder DPM [facility] [address]   7/27/2022  1:00 PM Mayra Schroeder DPM [facility] P[facility] [address]       Patient Instructions:      Ambulatory referral/consult to Podiatry   Standing Status: Future   Referral Priority: Urgent Referral Type: Consultation   Referral Reason: Specialty Services Required   Referred to Provider: MAYRA SCHROEDER Requested Specialty: Podiatry   Number of Visits Requested: 1     Ambulatory referral/consult to Infectious Disease   Standing Status: Future   Referral Priority: Routine Referral Type: Consultation   Referral Reason: Specialty Services Required   Referred to Provider: SOFÍA MORRIS JR Requested Specialty: Infectious Diseases   Number of Visits Requested: 1     Diet diabetic     Change dressing (specify)   Order Comments: Dressing change: Dressing changes 3x/week: cleanse left foot wound and left posterior leg wound with saline, apply hydrofera blue ready to both, wrap foot and lower leg with cast padding and coban.     Activity as tolerated     Weight bearing restrictions (specify):   Order Comments: LLE partial weight bearing (through heel for transfers only)       Significant Diagnostic Studies:     Recent Labs   Lab 06/16/22  1100   HGBA1C 9.5*     Recent Labs   Lab 07/04/22  0510 07/06/22  0406 07/08/22  0655   WBC 8.27 8.92 6.68   HGB 9.9* 10.3* 10.8*   HCT 32.7* 32.6* 35.3*    324 282     Recent Labs   Lab 07/04/22  0510 07/06/22  0406 07/08/22  0655   GRAN 64.1  5.3 58.1  5.2 50.1  3.3   LYMPH 16.3*  1.4 20.5  1.8 27.8  1.9   MONO 8.7  0.7 9.2  0.8 9.9  0.7   EOS 0.8* 1.0* 0.8*     Recent Labs   Lab 07/02/22  1133 07/04/22  0510 07/06/22  0406 07/08/22  0655    136 138 138   K 4.0 4.2 4.2 4.3    106  108 109   CO2 25 22* 19* 20*   BUN 27* 30* 29* 26*   CREATININE 1.4 1.5* 1.5* 1.4   * 261* 147* 99   CALCIUM 9.1 8.8 8.6* 9.1   ALBUMIN  --  2.5* 2.5* 2.6*   MG 1.9 1.8  --   --    PHOS 3.2 3.5  --   --      Recent Labs   Lab 07/04/22  0510 07/06/22  0406 07/08/22  0655   ALKPHOS 64 62 69   ALT 15 16 22   AST 18 23 29   PROT 7.0 6.9 7.5   BILITOT 0.3 0.3 0.3   CRP 14.5*  --   --      Recent Labs   Lab 07/04/22  0510        Recent Labs   Lab 02/23/22  1407 06/15/22  1633   LACTATE  --  1.4   SEDRATE >120* >120*     SARS-CoV2 (COVID-19) Qualitative PCR (no units)   Date Value   03/29/2021 Not Detected   03/15/2021 Not Detected   03/02/2021 Not Detected   02/22/2021 Not Detected   01/29/2021 Not Detected     SARS-CoV-2 RNA, Amplification, Qual (no units)   Date Value   02/03/2021 Negative     POC Rapid COVID (no units)   Date Value   06/15/2022 Negative   02/18/2021 Negative   12/08/2020 Negative       US Upper Extremity Veins Right  Narrative: EXAMINATION:  US UPPER EXTREMITY VEINS RIGHT    CLINICAL HISTORY:  edema to RUE;    TECHNIQUE:  Duplex and color flow Doppler evaluation and dynamic compression was performed of the right upper extremity veins.    COMPARISON:  None    FINDINGS:  Right PICC line.    Central veins: The right subclavian vein and right axillary vein are thrombosed and noncompressible.  The internal jugular vein is patent and free of thrombus.    Arm veins: The right basilic vein and right cephalic vein are thrombosed and noncompressible.  The brachial vein is patent and compressible.    Contralateral subclavian/internal jugular veins: The left subclavian and internal jugular veins are patent and free of thrombus.    There is a PICC catheter, partially visualized within the right subclavian and right basilic vein.  Impression: Thrombosis of the right subclavian, axillary, basilic, and cephalic veins.    This critical information above was relayed by Raegan Melvin MD by telephone to Keerthi  Tierra GOMEZ on 07/05/2022 at 18:07.    Electronically signed by resident: Raegan Melvin  Date:    07/05/2022  Time:    17:59    Electronically signed by: Milton Dove MD  Date:    07/05/2022  Time:    18:32      Pending Diagnostic Studies:     Procedure Component Value Units Date/Time    Specimen to Pathology, Surgery Orthopedics [[id number]] Collected: 06/16/22 1300    Order Status: Sent Lab Status: In process Updated: 06/16/22 1321    Specimen: Tissue          Medications:  Reconciled Home Medications:      Medication List      START taking these medications    apixaban 5 mg Tab  Commonly known as: ELIQUIS  Take 2 tablets (10 mg total) by mouth 2 (two) times daily for 4 days, THEN 1 tablet (5 mg total) 2 (two) times daily.  Start taking on: July 8, 2022     hydrALAZINE 25 MG tablet  Commonly known as: APRESOLINE  Take 1 tablet (25 mg total) by mouth 3 (three) times daily.     * insulin aspart U-100 100 unit/mL (3 mL) Inpn pen  Commonly known as: NovoLOG  Inject 0-5 Units into the skin before meals and at bedtime as needed (Hyperglycemia). **LOW CORRECTION DOSE**  Blood Glucose  mg/dL                  Pre-meal                2200  151-200                0 unit                      0 unit  201-250                2 units                    1 unit  251-300                3 units                    1 unit  301-350                4 units                    2 units  >350                     5 units                    3 units  Administer subcutaneously if needed at times designated by monitoring schedule.     * insulin aspart U-100 100 unit/mL (3 mL) Inpn pen  Commonly known as: NovoLOG  Inject 20 Units into the skin 3 (three) times daily with meals.     isosorbide dinitrate 10 MG tablet  Commonly known as: ISORDIL  Take 1 tablet (10 mg total) by mouth 3 (three) times daily.     Lactobacillus rhamnosus GG 10 billion cell capsule  Commonly known as: CULTURELLE  Take 1 capsule by mouth 2 (two) times daily.     NIFEdipine 90 MG  "(OSM) 24 hr tablet  Commonly known as: PROCARDIA-XL  Take 1 tablet (90 mg total) by mouth once daily.     sodium chloride 0.9% SolP 50 mL with DAPTOmycin 500 mg SolR 994.5 mg  Inject 994.5 mg into the vein once daily.         * This list has 2 medication(s) that are the same as other medications prescribed for you. Read the directions carefully, and ask your doctor or other care provider to review them with you.            CHANGE how you take these medications    gabapentin 100 MG capsule  Commonly known as: NEURONTIN  Take 2 capsules (200 mg total) by mouth 2 (two) times daily.  What changed:   · how much to take  · when to take this     LANTUS SOLOSTAR U-100 INSULIN glargine 100 units/mL SubQ pen  Generic drug: insulin  Inject 45 Units into the skin every evening.  What changed: how much to take        CONTINUE taking these medications    acetaminophen 500 MG tablet  Commonly known as: TYLENOL  Take 1,000 mg by mouth every 8 (eight) hours as needed for Pain.     aspirin 81 MG EC tablet  Commonly known as: ECOTRIN  Take 81 mg by mouth once daily.     BD ULTRA-FINE ADITYA PEN NEEDLE 32 gauge x 5/32" Ndle  Generic drug: pen needle, diabetic  USE FOUR TIMES DAILY WITH MEALS AND NIGHTLY     blood sugar diagnostic Strp  Commonly known as: CONTOUR TEST STRIPS  Inject 1 strip into the skin 2 (two) times daily before meals.     MICROLET LANCET Misc  Generic drug: lancets     rosuvastatin 40 MG Tab  Commonly known as: CRESTOR  Take 40 mg by mouth once daily.     tamsulosin 0.4 mg Cap  Commonly known as: FLOMAX  Take 1 capsule (0.4 mg total) by mouth once daily.     VITAMIN C 500 MG tablet  Generic drug: ascorbic acid (vitamin C)  Take 500 mg by mouth once daily.        STOP taking these medications    glipiZIDE 10 MG tablet  Commonly known as: GLUCOTROL     hydroCHLOROthiazide 25 MG tablet  Commonly known as: HYDRODIURIL     metFORMIN 500 MG ER 24hr tablet  Commonly known as: GLUCOPHAGE-XR            Indwelling Lines/Drains " at time of discharge:   Lines/Drains/Airways        Midline                                                                                                                             Placed 7/8/2022               Time spent on the discharge of patient: 45 minutes  This service was provided by Virtual Visit.   Patient was seen and examined on the date of discharge.  Additional time was spent speaking with consultants and case management, reviewing records, and/or discussing the plan of care with patient/family.  [facility]  Admitted 6/15/2022  2:10 PM  Present with the patient at the time of the telemed/virtual assessment: Telepresenter    Patient was transferred to [facility] on:  6/18/2022  This document was prepared by chart review and may not directly reflect my personal knowledge of the patient's case, clinical course, or significant events during the hospital stay.    The attending portion of this evaluation, treatment, and documentation was performed per Salma Strauss MD via Telemedicine AudioVisual using the secure [employer] software platform with 2 way audio/video. The provider was located off-site and the patient is located in the hospital. The aforementioned video software was utilized to document the relevant history and physical exam    Salma Strauss MD  Department of Hospital Medicine  [facility] - Telemetry Stepdown

## 2022-07-08 NOTE — ASSESSMENT & PLAN NOTE
-Bradycardic in ED but asymptomatic, chronic issue  -Repeat EKG 6/19:  BPM 44 Normal sinus rhythm - AV block, 2:1 - Nonspecific T wave abnormality   -avoid BB and chronotropic CCB  -Cardiology reviewed with no intervention recommended; monitor electrolytes

## 2022-07-08 NOTE — SUBJECTIVE & OBJECTIVE
Telemedicine  This service was provided by Virtual Visit.    Patient was transferred to [facility] on:  6/18/2022    Chief Complaint   Patient presents with    Wound Check     Sent from podiatry and stated in pain. L toes amputated     The patient location is: 8092/8092 A   Admitted 6/15/2022  2:10 PM  Present with the patient at the time of the telemed/virtual assessment: Telepresenter    Interval History / Events Overnight:   The patient is able to provide adequate history. Additional history was obtained from past medical records. No significant events reported by Nursing.   Patient complains of nothing specific. Symptoms have been unchanged since yesterday. Associated symptoms include: fatigue. Symptoms are stable.     Lab test(s) reviewed:  sCr stable.    Review of Systems   Constitutional:  Negative for fever.   Respiratory:  Negative for shortness of breath.    Gastrointestinal:  Negative for diarrhea.     Objective:     Vital Signs (Most Recent):  Temp: 98.6 °F (37 °C) (07/08/22 1243)  Pulse: (!) 54 (07/08/22 1243)  Resp: 16 (07/08/22 0800)  BP: (!) 140/67 (07/08/22 1243)  SpO2: 96 % (07/08/22 1243)   Vital Signs (24h Range):  Temp:  [97.7 °F (36.5 °C)-98.6 °F (37 °C)] 98.6 °F (37 °C)  Pulse:  [39-75] 54  Resp:  [14-22] 16  SpO2:  [93 %-98 %] 96 %  BP: (139-181)/(64-76) 140/67     Weight: 124.3 kg (274 lb 0.5 oz)  Body mass index is 40.47 kg/m².    Intake/Output Summary (Last 24 hours) at 7/8/2022 1339  Last data filed at 7/8/2022 1145  Gross per 24 hour   Intake --   Output 725 ml   Net -725 ml        Physical Exam  Constitutional:       General: He is not in acute distress.     Appearance: Normal appearance.   Eyes:      General: Lids are normal. No scleral icterus.        Right eye: No discharge.         Left eye: No discharge.      Conjunctiva/sclera: Conjunctivae normal.   Neck:      Trachea: Phonation normal.   Cardiovascular:      Rate and Rhythm: Bradycardia present.      Comments:  Monitor / Vital signs reviewed at time of visit  Pulmonary:      Effort: Pulmonary effort is normal. No tachypnea, accessory muscle usage or respiratory distress.   Abdominal:      General: There is no distension.   Musculoskeletal:      Left foot: Deformity (s/p partial amputation) present.   Skin:     Coloration: Skin is not cyanotic.   Neurological:      Mental Status: He is alert. He is not disoriented.   Psychiatric:         Attention and Perception: Attention normal.         Behavior: Behavior is cooperative.       Significant Labs:   Recent Labs   Lab 06/16/22  1100   HGBA1C 9.5*       Recent Labs   Lab 07/07/22  2318 07/08/22  0801 07/08/22  1201   POCTGLUCOSE 147* 107 179*       Recent Labs   Lab 07/04/22  0510 07/06/22  0406 07/08/22  0655   WBC 8.27 8.92 6.68   HGB 9.9* 10.3* 10.8*   HCT 32.7* 32.6* 35.3*    324 282       Recent Labs   Lab 07/04/22  0510 07/06/22  0406 07/08/22  0655   GRAN 64.1  5.3 58.1  5.2 50.1  3.3   LYMPH 16.3*  1.4 20.5  1.8 27.8  1.9   MONO 8.7  0.7 9.2  0.8 9.9  0.7   EOS 0.8* 1.0* 0.8*       Recent Labs   Lab 07/02/22  1133 07/04/22  0510 07/06/22  0406 07/08/22  0655    136 138 138   K 4.0 4.2 4.2 4.3    106 108 109   CO2 25 22* 19* 20*   BUN 27* 30* 29* 26*   CREATININE 1.4 1.5* 1.5* 1.4   * 261* 147* 99   CALCIUM 9.1 8.8 8.6* 9.1   ALBUMIN  --  2.5* 2.5* 2.6*   MG 1.9 1.8  --   --    PHOS 3.2 3.5  --   --        Recent Labs   Lab 07/04/22  0510 07/06/22  0406 07/08/22  0655   ALKPHOS 64 62 69   ALT 15 16 22   AST 18 23 29   PROT 7.0 6.9 7.5   BILITOT 0.3 0.3 0.3   CRP 14.5*  --   --        Recent Labs   Lab 02/23/22  1407 06/15/22  1633   LACTATE  --  1.4   SEDRATE >120* >120*       SARS-CoV2 (COVID-19) Qualitative PCR (no units)   Date Value   03/29/2021 Not Detected   03/15/2021 Not Detected   03/02/2021 Not Detected   02/22/2021 Not Detected   01/29/2021 Not Detected     SARS-CoV-2 RNA, Amplification, Qual (no units)   Date Value    02/03/2021 Negative     POC Rapid COVID (no units)   Date Value   06/15/2022 Negative   02/18/2021 Negative   12/08/2020 Negative     US Upper Extremity Veins Right  Narrative: EXAMINATION:  US UPPER EXTREMITY VEINS RIGHT    CLINICAL HISTORY:  edema to RUE;    TECHNIQUE:  Duplex and color flow Doppler evaluation and dynamic compression was performed of the right upper extremity veins.    COMPARISON:  None    FINDINGS:  Right PICC line.    Central veins: The right subclavian vein and right axillary vein are thrombosed and noncompressible.  The internal jugular vein is patent and free of thrombus.    Arm veins: The right basilic vein and right cephalic vein are thrombosed and noncompressible.  The brachial vein is patent and compressible.    Contralateral subclavian/internal jugular veins: The left subclavian and internal jugular veins are patent and free of thrombus.    There is a PICC catheter, partially visualized within the right subclavian and right basilic vein.  Impression: Thrombosis of the right subclavian, axillary, basilic, and cephalic veins.    This critical information above was relayed by Raegan Melvin MD by telephone to Keerthi Mayorga MD on 07/05/2022 at 18:07.    Electronically signed by resident: Raegan Melvin  Date:    07/05/2022  Time:    17:59    Electronically signed by: Milton Dove MD  Date:    07/05/2022  Time:    18:32      Labs and Imaging within the last 24 hours listed above were reviewed.       Diet: Diet diabetic  Diet diabetic [facility]; 2000 Calorie; Cardiac (Low Na/Chol); Isolation Tray - Styrofoam  Significant LDAs:   IV Access Type: Peripheral  Urinary Catheter Indication if present: Patient Does Not Have Urinary Catheter  Other Lines/Tubes/Drains:    HIGH RISK CONDITION(S):   Patient has a condition that poses threat to life and bodily function: limb ischemia likely to prevent wound healing      Goals of Care:    Previous admission:  2/17/21  Likely prognosis:  Fair  Code  Status: Full Code  Comfort Only: No  Hospice: No  Goals at discharge: remain at home, with physician follow-up    Discharge Planning:  OXANA: 7/8/2022     Code Status: Full Code   Is the patient medically ready for discharge?: Yes    Reason for patient still in hospital (select all that apply): Patient trending condition, Treatment, and Pending disposition  Discharge Plan A: Skilled Nursing Facility

## 2022-07-08 NOTE — ASSESSMENT & PLAN NOTE
Estimated Creatinine Clearance: 61.2 mL/min (based on SCr of 1.4 mg/dL).  Per med history, patient no longer taking ARB.  sCr up slightly and vanco level > 20.  Discontinued ARB, holding diuretic for now. Trial of gentle hydration ineffective.  Discontinued vancomycin.  -variable sCr but near baseline

## 2022-07-08 NOTE — PLAN OF CARE
Problem: Adult Inpatient Plan of Care  Goal: Absence of Hospital-Acquired Illness or Injury  Intervention: Identify and Manage Fall Risk  Flowsheets (Taken 7/8/2022 0450)  Safety Promotion/Fall Prevention:   assistive device/personal item within reach   commode/urinal/bedpan at bedside  Intervention: Prevent Skin Injury  Flowsheets (Taken 7/8/2022 0450)  Body Position: position changed independently  Skin Protection: adhesive use limited  Intervention: Prevent and Manage VTE (Venous Thromboembolism) Risk  Flowsheets (Taken 7/8/2022 0450)  Activity Management: Rolling - L1   Pt in bed with no complaints voiced, no acute distress noted at this time.  Assisted pt with bedtime care.  Bed locked and at lowest position.  Call light in hand.  Frequent assessments ongoing

## 2022-07-08 NOTE — ASSESSMENT & PLAN NOTE
-sCr baseline 1.1  -Continue to monitor  -sCr remained > baseline; near to baseline 6/26  -sCr 1.4 on 6/29; HCTZ was resumed on 6/27 for hypertension but stopped on 6/30 when sCr elevated; discontinued

## 2022-07-08 NOTE — ASSESSMENT & PLAN NOTE
-Last A1c 2020, repeat > 9%. Per patient, taking 68 units Lantus QHS along with metformin  mg and glipizide 10 mg daily (Possibly, he is not sure)  -Per Pharm med history: patient is no longer taking insulin  -Mildly hypoglycemic on admit with BG 63  -decreased dose of basal insulin to 15 units Levemir, Titrate up as needed. Added prandial insulin 6/19  -worsening hyperglycemia as oral agents wash out. Levemir increased to BID and aspart increased with meals.  - doses adjusted 6/21; increased for 6/23, and again for 6/25  - some hyperglycemia due to dietary indiscretion; decreased basal insulin 6/27  - BGs controlled when patient adheres to diet (no regular sodas)  - Lantus 45 units QHS with aspart 20 units TIDWM

## 2022-07-08 NOTE — ASSESSMENT & PLAN NOTE
Estimated Creatinine Clearance: 57.1 mL/min (A) (based on SCr of 1.5 mg/dL (H)).  Per med history, patient no longer taking ARB.  sCr up slightly and vanco level > 20.  Discontinued ARB, holding diuretic for now. Trial of gentle hydration ineffective.  Discontinued vancomycin.  -variable sCr but near baseline

## 2022-07-08 NOTE — PLAN OF CARE
Spoke with Brandy ([phone]) at [facility] who reports they will be able to accept patient today, and should be able to receive the Human auth today. Brandy will reach out to [facility] to obtain the auth date for today and will return call to  with room number and report number. Spoke with Dr. Strauss who reports patient is medically cleared for discharge. Will need updated SNF orders, MD aware.    12:14 PM  Per Brandy, [facility] will send the auth to [facility] today.  Nurse can call report in 15 minutes to [place] at [phone] and patient will transfer to [facility] room 24A. RN and MD notified.  will arrange transportation once [place] is able to review the updated [facility] orders.       07/08/22 1332   Post-Acute Status   Post-Acute Authorization Placement   Post-Acute Placement Status Set-up Complete/Auth obtained   Per Brandy, ready for patient transfer. [facility] RN attempted to provide report, but [facility] RN wanted to review the updated SNF orders.  provided [facility] RN's phone number to [facility] for [facility] RN to call and receive report when ready.     arranged wheelchair transport via [facility]. Requested  time is 2:45 PM. Requested  time does not guarantee arrival time.      1:50 PM  Was informed by MD that patient does not currently have a PICC line. Patient will need a PICC line prior to discharge today. PICC line has been ordered. Contacted [facility] and requested w/c van to be rescheduled to 4:00 PM.  will cancel transportation if necessary. Updated Brandy at [facility].    3:14 PM  Patient received a midline. MD updated SNF orders and SW sent orders to [facility]. Spoke with Brandy, ready for patient. Informed that ETA for w/c van is 4:00 PM.    Toshia Dennis QUINCY  [facility]  Ext. [phone]

## 2022-07-08 NOTE — PROGRESS NOTES
[facility] - Telemetry [facility] Medicine  Telemedicine Progress Note    Patient Name: Saji Castañeda  MRN: [id number]  Patient Class: IP- Inpatient   Admission Date: 6/15/2022  Length of Stay: 22 days  Attending Physician: Salma Strauss MD  Primary Care Provider: Mart Escalante MD      Subjective:     Principal Problem:Osteomyelitis of left lower extremity    HPI:  71 y.o. M with PMHx DM2 and PVD with chronic diabetic foot wounds s/p L sided BKA, HTN, asymptomatic bradycardia, CKD 3 and HLD who presented to the ED for worsening R foot pain and drainage. Pt. With known chronic ulceration/osteomylitis to L heel and ID has recommended BKA for definitive therapy, but the patient reports that he has not been interested in amputation. Today, he had his usual f/u appointment with ID, and the patient was referred to the ED for imaging with bone biopsy and potential abx because of worsening drainage and pain. Pt. Denies any current fevers, chills, nausea, or other systemic signs of infection.      Overview/Hospital Course:  Patient admitted to hospital medicine. Podiatry and vascular surgery consulted. Patient underwent bone biopsy and culture. Infectious disease consulted.       Telemedicine  This service was provided by Virtual Visit.    Patient was transferred to [facility] on:  6/18/2022    Chief Complaint   Patient presents with    Wound Check     Sent from podiatry and stated in pain. L toes amputated     The patient location is: 8092/8092 A   Admitted 6/15/2022  2:10 PM  Present with the patient at the time of the telemed/virtual assessment: Telepresenter    Interval History / Events Overnight:   The patient is able to provide adequate history. Additional history was obtained from past medical records. No significant events reported by Nursing.   Patient complains of nothing specific. Symptoms have been unchanged since yesterday. Associated symptoms include: fatigue. Symptoms are stable.      Lab test(s) reviewed:  sCr stable.    Review of Systems   Constitutional:  Negative for fever.   Respiratory:  Negative for shortness of breath.    Gastrointestinal:  Negative for diarrhea.     Objective:     Vital Signs (Most Recent):  Temp: 97.8 °F (36.6 °C) (07/07/22 0725)  Pulse: 66 (07/07/22 0725)  Resp: 18 (07/07/22 0725)  BP: (!) 140/67 (07/07/22 0725)  SpO2: (!) 94 % (07/07/22 0725)   Vital Signs (24h Range):  Temp:  [97.8 °F (36.6 °C)-99 °F (37.2 °C)] 97.8 °F (36.6 °C)  Pulse:  [41-77] 66  Resp:  [17-21] 18  SpO2:  [91 %-98 %] 94 %  BP: (118-169)/() 140/67     Weight: 124.3 kg (274 lb 0.5 oz)  Body mass index is 40.47 kg/m².    Intake/Output Summary (Last 24 hours) at 7/7/2022 1018  Last data filed at 7/7/2022 0800  Gross per 24 hour   Intake 1385 ml   Output 1450 ml   Net -65 ml        Physical Exam  Constitutional:       General: He is not in acute distress.     Appearance: Normal appearance.   Eyes:      General: Lids are normal. No scleral icterus.        Right eye: No discharge.         Left eye: No discharge.      Conjunctiva/sclera: Conjunctivae normal.   Neck:      Trachea: Phonation normal.   Cardiovascular:      Rate and Rhythm: Bradycardia present.      Comments: Monitor / Vital signs reviewed at time of visit  Pulmonary:      Effort: Pulmonary effort is normal. No tachypnea, accessory muscle usage or respiratory distress.   Abdominal:      General: There is no distension.   Musculoskeletal:      Left foot: Deformity (s/p partial amputation) present.   Skin:     Coloration: Skin is not cyanotic.   Neurological:      Mental Status: He is alert. He is not disoriented.   Psychiatric:         Attention and Perception: Attention normal.         Behavior: Behavior is cooperative.       Significant Labs:   Recent Labs   Lab 06/16/22  1100   HGBA1C 9.5*       Recent Labs   Lab 07/06/22  1542 07/06/22  2125 07/07/22  0724   POCTGLUCOSE 154* 104 155*       Recent Labs   Lab 07/02/22  2889  07/04/22  0510 07/06/22  0406   WBC 8.10 8.27 8.92   HGB 10.0* 9.9* 10.3*   HCT 32.3* 32.7* 32.6*    364 324       Recent Labs   Lab 07/02/22 0318 07/04/22  0510 07/06/22  0406   GRAN 57.6  4.7 64.1  5.3 58.1  5.2   LYMPH 21.7  1.8 16.3*  1.4 20.5  1.8   MONO 10.5  0.9 8.7  0.7 9.2  0.8   EOS 0.7* 0.8* 1.0*       Recent Labs   Lab 07/01/22  0441 07/02/22 0318 07/02/22  1133 07/04/22  0510 07/06/22  0406    137 136 136 138   K 4.4 5.4* 4.0 4.2 4.2    107 105 106 108   CO2 24 20* 25 22* 19*   BUN 27* 28* 27* 30* 29*   CREATININE 1.4 1.4 1.4 1.5* 1.5*   * 160* 211* 261* 147*   CALCIUM 9.5 8.5* 9.1 8.8 8.6*   ALBUMIN 2.7* 2.6*  --  2.5* 2.5*   MG  --   --  1.9 1.8  --    PHOS 4.0  --  3.2 3.5  --        Recent Labs   Lab 07/02/22 0318 07/04/22  0510 07/06/22  0406   ALKPHOS 65 64 62   ALT 13 15 16   AST 18 18 23   PROT 6.6 7.0 6.9   BILITOT 0.3 0.3 0.3   CRP  --  14.5*  --        Recent Labs   Lab 02/23/22  1407 06/15/22  1633   LACTATE  --  1.4   SEDRATE >120* >120*       SARS-CoV2 (COVID-19) Qualitative PCR (no units)   Date Value   03/29/2021 Not Detected   03/15/2021 Not Detected   03/02/2021 Not Detected   02/22/2021 Not Detected   01/29/2021 Not Detected     SARS-CoV-2 RNA, Amplification, Qual (no units)   Date Value   02/03/2021 Negative     POC Rapid COVID (no units)   Date Value   06/15/2022 Negative   02/18/2021 Negative   12/08/2020 Negative     US Upper Extremity Veins Right  Narrative: EXAMINATION:  US UPPER EXTREMITY VEINS RIGHT    CLINICAL HISTORY:  edema to RUE;    TECHNIQUE:  Duplex and color flow Doppler evaluation and dynamic compression was performed of the right upper extremity veins.    COMPARISON:  None    FINDINGS:  Right PICC line.    Central veins: The right subclavian vein and right axillary vein are thrombosed and noncompressible.  The internal jugular vein is patent and free of thrombus.    Arm veins: The right basilic vein and right cephalic vein are  thrombosed and noncompressible.  The brachial vein is patent and compressible.    Contralateral subclavian/internal jugular veins: The left subclavian and internal jugular veins are patent and free of thrombus.    There is a PICC catheter, partially visualized within the right subclavian and right basilic vein.  Impression: Thrombosis of the right subclavian, axillary, basilic, and cephalic veins.    This critical information above was relayed by Raegan Melvin MD by telephone to Keerthi Mayorga MD on 07/05/2022 at 18:07.    Electronically signed by resident: Raegan Melvin  Date:    07/05/2022  Time:    17:59    Electronically signed by: Milton Dove MD  Date:    07/05/2022  Time:    18:32      Labs and Imaging within the last 24 hours listed above were reviewed.       Diet: Diet diabetic  Diet diabetic [facility]; 2000 Calorie; Cardiac (Low Na/Chol); Isolation Tray - Styrofoam  Significant LDAs:   IV Access Type: Peripheral  Urinary Catheter Indication if present: Patient Does Not Have Urinary Catheter  Other Lines/Tubes/Drains:    HIGH RISK CONDITION(S):   Patient has a condition that poses threat to life and bodily function: limb ischemia likely to prevent wound healing      Goals of Care:    Previous admission:  2/17/21  Likely prognosis:  Fair  Code Status: Full Code  Comfort Only: No  Hospice: No  Goals at discharge: remain at home, with physician follow-up    Discharge Planning:  OXANA: 7/6/2022     Code Status: Full Code   Is the patient medically ready for discharge?: Yes    Reason for patient still in hospital (select all that apply): Patient trending condition, Treatment, and Pending disposition  Discharge Plan A: Skilled Nursing Facility       Assessment/Plan:      * Osteomyelitis of left lower extremity  -With acute on chronic osteomyleitis of LLE including heel with purulent foul smelling drainage  -Podiatry consulted, Not septic appearing. S/p bone biopsy.  -ID consulted. Cultures growing Staph aureus  and GBS   - L foot wound anaerobic culture growing Prevotella and Porphyromonas Somerae. PO Flagyl added.    - L foot bone culture growing Staph species, continued IV ceftriaxone and vancomycin (per Pharmacy dosing).  - with acute renal insufficiency, vancomycin changed to daptomycin and ceftriaxone discontinued as not needed based on current cultures. Continuing Flagyl  -Continuing daptomycin (patient's renal function cannot tolerate vancomycin) and Flagyl; Estimated end date of daptomycin: 7/28/22. Estimated end date of Flagyl: 7/4/22    Acute deep vein thrombosis (DVT) of right upper extremity   Involving the right subclavian, axillary, basilic, and cephalic veins.   -started apixaban therapy and removed PICC line    Acute renal insufficiency  Estimated Creatinine Clearance: 57.1 mL/min (A) (based on SCr of 1.5 mg/dL (H)).  Per med history, patient no longer taking ARB.  sCr up slightly and vanco level > 20.  Discontinued ARB, holding diuretic for now. Trial of gentle hydration ineffective.  Discontinued vancomycin.  -variable sCr but near baseline    Peripheral arterial disease  -PVD contributing to current non-healing foot wound. Arterial US shows LLE atherosclerotic disease, areas of high-grade stenosis within the L popliteal artery  -Continue home statin, ASA  -Vascular Surgery consulted - patient considering surgery (BKA); seems reluctant primarily due to the time he expects it to take to obtain a prosthetic limb. Wants to try conservative management with antibiotics.  -Plan for SNF for IV antibiotics, PT/OT  - Follow up with Podiatry, ID and Vascular Surgery    Asymptomatic Mobitz (type) I (Wenckebach's) atrioventricular block  -Bradycardic in ED but asymptomatic, chronic issue  -Repeat EKG 6/19:  BPM 44 Normal sinus rhythm - AV block, 2:1 - Nonspecific T wave abnormality   -avoid BB and chronotropic CCB  -Cardiology reviewed with no intervention recommended; monitor electrolytes    Chronic kidney disease,  stage III (moderate)  -sCr baseline 1.1  -Continue to monitor  -sCr remained > baseline; near to baseline 6/26  -sCr 1.4 on 6/29; HCTZ was resumed on 6/27 for hypertension but stopped on 6/30 when Cr elevated    Type 2 diabetes, uncontrolled, with neuropathy  -Last A1c 2020, repeat > 9%. Per patient, taking 68 units Lantus QHS along with metformin  mg and glipizide 10 mg daily (Possibly, he is not sure)  -Per Pharm med history: patient is no longer taking insulin  -Mildly hypoglycemic on admit with BG 63  -decreased dose of basal insulin to 15 units Levemir, Titrate up as needed. Added prandial insulin 6/19  -worsening hyperglycemia as oral agents wash out. Levemir increased to BID and aspart increased with meals.  - doses adjusted 6/21; increased for 6/23, and again for 6/25  - some hyperglycemia due to dietary indiscretion; decreased basal insulin 6/27  - BGs controlled when patient adheres to diet (no regular sodas)    Essential hypertension  Continued losartan 100mg and HCTZ 25 mg, with hydralazine 25 mg PO q8h PRN  Added nifedipine 6/18  Consulted PharmD for med history: patient reports no longer taking isosorbide-hydralazine, losartan, and losartan-HCTZ  Discontinued ARB, held HCTZ due to increasing sCr. Continued nifedipine for BP control.  Resumed diuretic when renal insufficiency improved; but did not tolerate so stopped  Hydralazine dose changed multiple times; isordil added to regimen on 7/2        Active Hospital Problems    Diagnosis  POA    *Osteomyelitis of left lower extremity [M86.9]  Yes    Acute deep vein thrombosis (DVT) of right upper extremity [I82.621]  Unknown    Acute renal insufficiency [N28.9]  No    Foot pain, left [M79.672]  Yes    Peripheral arterial disease [I73.9]  Yes     Chronic    Asymptomatic Mobitz (type) I (Wenckebach's) atrioventricular block [I44.1]  Yes    Chronic kidney disease, stage III (moderate) [N18.30]  Yes    Type 2 diabetes, uncontrolled, with  "neuropathy [E11.40, E11.65]  Yes     Chronic    Essential hypertension [I10]  Yes     Chronic      Resolved Hospital Problems   No resolved problems to display.       Inpatient Medications Prescribed for Management of Current Problems:     Scheduled Meds:    apixaban  10 mg Oral BID    Followed by    [START ON 7/12/2022] apixaban  5 mg Oral BID    aspirin  81 mg Oral Daily    atorvastatin  80 mg Oral Daily    DAPTOmycin (CUBICIN) IV  8 mg/kg Intravenous Q24H    gabapentin  200 mg Oral BID    hydrALAZINE  25 mg Oral TID    insulin aspart U-100  20 Units Subcutaneous TIDWM    insulin detemir U-100  24 Units Subcutaneous BID    isosorbide dinitrate  10 mg Oral TID    Lactobacillus rhamnosus GG  1 capsule Oral BID    NIFEdipine  90 mg Oral Daily    sodium chloride 0.9%  10 mL Intravenous Q6H    tamsulosin  0.4 mg Oral Daily     Continuous Infusions:   As Needed: acetaminophen, albuterol-ipratropium, aluminum-magnesium hydroxide-simethicone, dextrose 10%, dextrose 10%, glucagon (human recombinant), glucose, glucose, hydrALAZINE, insulin aspart U-100, melatonin, naloxone, ondansetron, oxyCODONE-acetaminophen, prochlorperazine, sars-cov-2 (covid-19), sodium chloride 0.9%, Flushing PICC Protocol **AND** sodium chloride 0.9% **AND** sodium chloride 0.9%    VTE Risk Mitigation (From admission, onward)         Ordered     apixaban tablet 5 mg  2 times daily        "Followed by" Linked Group Details    07/05/22 1858     apixaban tablet 10 mg  2 times daily        "Followed by" Linked Group Details    07/05/22 1858     IP VTE HIGH RISK PATIENT  Once         06/15/22 1957     Place sequential compression device  Until discontinued         06/15/22 1957                I have assessed these finding virtually using telemed platform and with assistance of bedside nurse     The attending portion of this evaluation, treatment, and documentation was performed per Salma Strauss MD via Telemedicine AudioVisual using the " secure [employer] software platform with 2 way audio/video. The provider was located off-site and the patient is located in the hospital. The aforementioned video software was utilized to document the relevant history and physical exam.     Salma Strauss MD  Department of Hospital Medicine   [facility] - Telemetry Stepdown

## 2022-07-08 NOTE — PT/OT/SLP PROGRESS
"Occupational Therapy   Treatment    Name: Saji Castañeda  MRN: [id number]  Admitting Diagnosis:  Osteomyelitis of left lower extremity       Recommendations:     Discharge Recommendations: nursing facility, skilled  Discharge Equipment Recommendations:  bedside commode, walker, rolling  Barriers to discharge:  Decreased caregiver support    Assessment:     Saji Castañeda is a 72 y.o. male with a medical diagnosis of Osteomyelitis of left lower extremity.  He presents with good participation and motivation. Pt continues to require (A) with all activities & is at risk for falls. Goals remain appropriate.. Performance deficits affecting function are weakness, impaired endurance, impaired self care skills, impaired functional mobilty, gait instability, impaired balance, decreased coordination, decreased lower extremity function, decreased safety awareness, decreased ROM, edema, impaired skin, pain, orthopedic precautions.     Rehab Prognosis:  Good; patient would benefit from acute skilled OT services to address these deficits and reach maximum level of function.       Plan:     Patient to be seen 3 x/week to address the above listed problems via self-care/home management, therapeutic activities, therapeutic exercises, neuromuscular re-education  · Plan of Care Expires: 07/21/22  · Plan of Care Reviewed with: patient    Subjective   "doing good"  Pain/Comfort:  · Pain Rating 1: 0/10  · Pain Rating Post-Intervention 1: 0/10    Objective:     Communicated with: RN prior to session.  Patient found HOB elevated with telemetry, pulse ox (continuous) upon OT entry to room.    General Precautions: Standard, fall, contact   Orthopedic Precautions:LLE partial weight bearing (PWB to L heel for tf's only)   Braces:  (Darco RLE)  Respiratory Status: Room air     Occupational Performance:     Bed Mobility:    · Patient completed Scooting/Bridging with stand by assistance  · Patient completed Supine to Sit with stand by assistance "     Functional Mobility/Transfers:  · Sit <> Stand Transfer with contact guard assistance and minimum assistance  with  rolling walker   · x2 trials from EOB  · Cues for hand/foot placement  · Patient completed Bed > Chair Transfer using Step Transfer technique with minimum assistance with rolling walker  · Cues for RW management, upright posture, and sequencing    Activities of Daily Living:  · Upper Body Dressing: set up (A) to don/doff gown anteriorly  · Lower Body Dressing: total assistance to don R darco shoe  · Toileting: maximal assistance to wipe posteriorly in standing due to pt found with soiled pads      [facility] 6 Click ADL: 17    Treatment & Education:  -Education on energy conservation and task modification to maximize safety and (I) during ADLs and mobility  -Education on importance of OOB activity to improve overall activity tolerance and promote recovery  -Pt educated to call for assistance and to transfer with hospital staff only  -Provided education regarding role of OT, POC, & discharge recommendations with pt verbalizing understanding.  Pt had no further questions & when asked whether there were any concerns pt reported none.      Patient left up in chair with all lines intact, call button in reach and MD presentEducation:      GOALS:   Multidisciplinary Problems     Occupational Therapy Goals        Problem: Occupational Therapy    Goal Priority Disciplines Outcome Interventions   Occupational Therapy Goal     OT, PT/OT Ongoing, Progressing    Description: Goals to be met by: 07-02-22     Patient will increase functional independence with ADLs by performing:    UE Dressing with Set-up Assistance.  LE Dressing with Minimal Assistance.  Grooming while seated with Set-up Assistance.  Toileting from bedside commode with Stand-by Assistance for hygiene and clothing management.   Supine to sit with Stand-by Assistance.  Stand pivot transfers with Stand-by Assistance with RW and PWB in left heel  only  Toilet transfer to bedside commode with Stand-by Assistance.  Pt. To perform scoot pivot transfer with Supervision to and from the wheelchair                     Time Tracking:     OT Date of Treatment: 07/08/22  OT Start Time: 1207  OT Stop Time: 1222  OT Total Time (min): 15 min    Billable Minutes:Self Care/Home Management 15    OT/KATHLEEN: OT          7/8/2022

## 2022-07-11 NOTE — PLAN OF CARE
[facility] - Telemetry Stepdown  Discharge Final Note    Primary Care Provider: Mart Escalante MD    Expected Discharge Date: 7/8/2022       Future Appointments   Date Time Provider Department Center   7/13/2022  1:00 PM Mayra Scrhoeder DPM [facility] POD [facility]   7/18/2022  1:00 PM Anuja Farrell DO [facility] WOUND [facility]   7/20/2022  1:00 PM Mayra Schroeder DPM [facility] P[facility]   7/27/2022  1:00 PM Mayra Schroeder DPM [facility]         Final Discharge Note (most recent)     Final Note - 07/11/22 1221        Final Note    Assessment Type Final Discharge Note     Anticipated Discharge Disposition Skilled Nursing Facility     What phone number can be called within the next 1-3 days to see how you are doing after discharge? [phone]     Hospital Resources/Appts/Education Provided Appointments scheduled and added to AVS   Ambulatory referral sent to ID.       Post-Acute Status    Post-Acute Authorization Placement     Post-Acute Placement Status Set-up Complete/Auth obtained   [facility]                Important Message from Medicare  Important Message from Medicare regarding Discharge Appeal Rights: Given to patient/caregiver, Explained to patient/caregiver, Signed/date by patient/caregiver     Date IMM was signed: 07/08/22  Time IMM was signed: 4851    Contact Info     Mart Escalante MD   Specialty: Family Medicine   Relationship: PCP - General    [address] [place] [address]   Phone: [phone]       Next Steps: Schedule an appointment as soon as possible for a visit in 3 week(s)    Instructions: For discharge from hospital follow up    JOHNNY Goel   Specialty: Infectious Diseases    [address]  [place] [address]   Phone: [phone]       Next Steps: Schedule an appointment as soon as possible for a visit in 2 week(s)    Instructions: For discharge from hospital follow up, As previously planned    Mayra Schroeder DPM   Specialty:  Podiatry    [address]  [facility] [address]   Phone: [phone]       Next Steps: Go in 4 week(s)    Instructions: As Scheduled, For discharge from hospital follow up, Wound check    [facility]   Specialty: Skilled Nursing Facility    [address] [place] [address]   Phone: [phone]       Next Steps: Follow up          Barb Brown RN  Ext [phone]

## 2022-07-13 ENCOUNTER — OFFICE VISIT (OUTPATIENT)
Dept: PODIATRY | Facility: CLINIC | Age: 72
End: 2022-07-13
Payer: MEDICARE

## 2022-07-13 VITALS
BODY MASS INDEX: 40.59 KG/M2 | HEIGHT: 69 IN | WEIGHT: 274.06 LBS | DIASTOLIC BLOOD PRESSURE: 72 MMHG | HEART RATE: 72 BPM | SYSTOLIC BLOOD PRESSURE: 185 MMHG

## 2022-07-13 DIAGNOSIS — I73.9 PVD (PERIPHERAL VASCULAR DISEASE): ICD-10-CM

## 2022-07-13 DIAGNOSIS — I73.9 PERIPHERAL ARTERIAL DISEASE: ICD-10-CM

## 2022-07-13 DIAGNOSIS — E11.49 TYPE II DIABETES MELLITUS WITH NEUROLOGICAL MANIFESTATIONS: Primary | ICD-10-CM

## 2022-07-13 DIAGNOSIS — M79.89 LEG SWELLING: ICD-10-CM

## 2022-07-13 DIAGNOSIS — L97.424 HEEL ULCER, LEFT, WITH NECROSIS OF BONE: ICD-10-CM

## 2022-07-13 PROCEDURE — 3077F PR MOST RECENT SYSTOLIC BLOOD PRESSURE >= 140 MM HG: ICD-10-PCS | Mod: CPTII,S$GLB,, | Performed by: PODIATRIST

## 2022-07-13 PROCEDURE — 1159F MED LIST DOCD IN RCRD: CPT | Mod: CPTII,S$GLB,, | Performed by: PODIATRIST

## 2022-07-13 PROCEDURE — 4010F ACE/ARB THERAPY RXD/TAKEN: CPT | Mod: CPTII,S$GLB,, | Performed by: PODIATRIST

## 2022-07-13 PROCEDURE — 1126F AMNT PAIN NOTED NONE PRSNT: CPT | Mod: CPTII,S$GLB,, | Performed by: PODIATRIST

## 2022-07-13 PROCEDURE — 3046F PR MOST RECENT HEMOGLOBIN A1C LEVEL > 9.0%: ICD-10-PCS | Mod: CPTII,S$GLB,, | Performed by: PODIATRIST

## 2022-07-13 PROCEDURE — 3078F DIAST BP <80 MM HG: CPT | Mod: CPTII,S$GLB,, | Performed by: PODIATRIST

## 2022-07-13 PROCEDURE — 3046F HEMOGLOBIN A1C LEVEL >9.0%: CPT | Mod: CPTII,S$GLB,, | Performed by: PODIATRIST

## 2022-07-13 PROCEDURE — 1159F PR MEDICATION LIST DOCUMENTED IN MEDICAL RECORD: ICD-10-PCS | Mod: CPTII,S$GLB,, | Performed by: PODIATRIST

## 2022-07-13 PROCEDURE — 99999 PR PBB SHADOW E&M-EST. PATIENT-LVL III: CPT | Mod: PBBFAC,,, | Performed by: PODIATRIST

## 2022-07-13 PROCEDURE — 99213 PR OFFICE/OUTPT VISIT, EST, LEVL III, 20-29 MIN: ICD-10-PCS | Mod: S$GLB,,, | Performed by: PODIATRIST

## 2022-07-13 PROCEDURE — 1126F PR PAIN SEVERITY QUANTIFIED, NO PAIN PRESENT: ICD-10-PCS | Mod: CPTII,S$GLB,, | Performed by: PODIATRIST

## 2022-07-13 PROCEDURE — 3078F PR MOST RECENT DIASTOLIC BLOOD PRESSURE < 80 MM HG: ICD-10-PCS | Mod: CPTII,S$GLB,, | Performed by: PODIATRIST

## 2022-07-13 PROCEDURE — 4010F PR ACE/ARB THEARPY RXD/TAKEN: ICD-10-PCS | Mod: CPTII,S$GLB,, | Performed by: PODIATRIST

## 2022-07-13 PROCEDURE — 3008F PR BODY MASS INDEX (BMI) DOCUMENTED: ICD-10-PCS | Mod: CPTII,S$GLB,, | Performed by: PODIATRIST

## 2022-07-13 PROCEDURE — 1111F DSCHRG MED/CURRENT MED MERGE: CPT | Mod: CPTII,S$GLB,, | Performed by: PODIATRIST

## 2022-07-13 PROCEDURE — 1111F PR DISCHARGE MEDS RECONCILED W/ CURRENT OUTPATIENT MED LIST: ICD-10-PCS | Mod: CPTII,S$GLB,, | Performed by: PODIATRIST

## 2022-07-13 PROCEDURE — 99213 OFFICE O/P EST LOW 20 MIN: CPT | Mod: S$GLB,,, | Performed by: PODIATRIST

## 2022-07-13 PROCEDURE — 99999 PR PBB SHADOW E&M-EST. PATIENT-LVL III: ICD-10-PCS | Mod: PBBFAC,,, | Performed by: PODIATRIST

## 2022-07-13 PROCEDURE — 3077F SYST BP >= 140 MM HG: CPT | Mod: CPTII,S$GLB,, | Performed by: PODIATRIST

## 2022-07-13 PROCEDURE — 3008F BODY MASS INDEX DOCD: CPT | Mod: CPTII,S$GLB,, | Performed by: PODIATRIST

## 2022-07-18 ENCOUNTER — HOSPITAL ENCOUNTER (OUTPATIENT)
Dept: WOUND CARE | Facility: HOSPITAL | Age: 72
Discharge: HOME | End: 2022-07-18
Attending: SURGERY
Payer: MEDICARE

## 2022-07-18 VITALS
DIASTOLIC BLOOD PRESSURE: 73 MMHG | BODY MASS INDEX: 40.58 KG/M2 | HEART RATE: 60 BPM | RESPIRATION RATE: 18 BRPM | TEMPERATURE: 99 F | WEIGHT: 274 LBS | HEIGHT: 69 IN | SYSTOLIC BLOOD PRESSURE: 183 MMHG

## 2022-07-18 DIAGNOSIS — R00.1 BRADYCARDIA: ICD-10-CM

## 2022-07-18 DIAGNOSIS — M86.172 ACUTE OSTEOMYELITIS OF CALCANEUM, LEFT: Primary | ICD-10-CM

## 2022-07-18 DIAGNOSIS — R94.31 ABNORMAL EKG: ICD-10-CM

## 2022-07-18 DIAGNOSIS — S81.802A OPEN WOUND OF LEFT LOWER LEG, INITIAL ENCOUNTER: ICD-10-CM

## 2022-07-18 DIAGNOSIS — I73.9 PERIPHERAL ARTERIAL DISEASE: ICD-10-CM

## 2022-07-18 DIAGNOSIS — R60.0 EDEMA OF LEG: Chronic | ICD-10-CM

## 2022-07-18 DIAGNOSIS — Z01.810 PRE-PROCEDURAL CARDIOVASCULAR EXAMINATION: ICD-10-CM

## 2022-07-18 PROCEDURE — 99204 OFFICE O/P NEW MOD 45 MIN: CPT

## 2022-07-18 PROCEDURE — 11044 DBRDMT BONE 1ST 20 SQ CM/<: CPT

## 2022-07-18 NOTE — PROGRESS NOTES
"                     [facility]    Subjective:       Patient ID: Saji Castañeda is a 72 y.o. male.    Chief Complaint: Non-healing Wound    7/18/22:  Admit to the [facility] with non-healing wound to the left foot, heel, and leg.  Patient stated, " The wounds have been there a long time." he previously lived at home then was admitted to [facility] for stage IV L heel wound and osteo. He was subsequently discharged to SNF/rehab at [facility]. He was referred to the [facility] by Dr. Kenyon for HBO workup regarding Left heel osteomyelitis.  Patient is currently in [place] rehab and is accompanied by [facility].  LUE PICC line in place for IV antibiotics.  PMH:  DM, neuropathy, HTN, PAD, PVD, CKD, acute osteomyelitis of left calcaneum, DVT, anemia, morbid obesity and LE edema. Sharps debridement of Left heel wound per Dr. Farrell.  Work up for HBO:  Cardiology referral, chest x-ray, CRP, sed rate, CMP and CBC.  New wound care orders sent to [facility].    Review of Systems    All systems were reviewed and are negative, except that mentioned in the HPI.    Objective:        Physical Exam  Constitutional:       Appearance: He is well-developed.   HENT:      Head: Normocephalic and atraumatic.   Eyes:      Pupils: Pupils are equal, round, and reactive to light.   Cardiovascular:      Rate and Rhythm: Normal rate.   Pulmonary:      Effort: Pulmonary effort is normal. No respiratory distress.      Breath sounds: No stridor.   Abdominal:      General: There is no distension.   Musculoskeletal:      Cervical back: Normal range of motion.   Skin:     Comments: See Synopsis for wound details   Neurological:      Mental Status: He is alert and oriented to person, place, and time.            Wound 07/18/22 1333 Diabetic Ulcer Left Heel (Active)   07/18/22 1333    Pre-existing: Yes   Primary Wound Type: Diabetic ulc   Side: Left   Orientation:    Location: Heel   Wound Number:  "   Ankle-Brachial Index:    Pulses:    Removal Indication and Assessment:    Wound Outcome:    (Retired) Wound Type:    (Retired) Wound Length (cm):    (Retired) Wound Width (cm):    (Retired) Depth (cm):    Wound Description (Comments):    Removal Indications:    Wound Image   07/18/22 1300   Dressing Appearance Intact;Moist drainage 07/18/22 1300   Drainage Amount Moderate 07/18/22 1300   Drainage Characteristics/Odor Serosanguineous 07/18/22 1300   Appearance Red;Pink;Moist;Yellow;Bone 07/18/22 1300   Tissue loss description Full thickness 07/18/22 1300   Red (%), Wound Tissue Color 90 % 07/18/22 1300   Yellow (%), Wound Tissue Color 10 % 07/18/22 1300   Periwound Area Edematous;Hemosiderin Staining;Moist 07/18/22 1300   Wound Edges Irregular 07/18/22 1300   Wound Length (cm) 4.8 cm 07/18/22 1300   Wound Width (cm) 3.5 cm 07/18/22 1300   Wound Depth (cm) 0.6 cm 07/18/22 1300   Wound Volume (cm^3) 10.08 cm^3 07/18/22 1300   Wound Surface Area (cm^2) 16.8 cm^2 07/18/22 1300   Care Cleansed with:;Sterile normal saline 07/18/22 1300   Dressing Applied;Foam;Absorptive Pad;Cast padding;Hydrofiber;Elastic bandage;Tubular bandage 07/18/22 1300   Periwound Care Absorptive dressing applied;Moisturizer applied 07/18/22 1300   Compression Tubular elasticized bandage 07/18/22 1300   Off Loading Other (see comments) 07/18/22 1300   Dressing Change Due 07/21/22 07/18/22 1300            Wound 07/18/22 1333 Diabetic Ulcer anterior;lower Leg (Active)   07/18/22 1333    Pre-existing: Yes   Primary Wound Type: Diabetic ulc   Side:    Orientation: anterior;lower   Location: Leg   Wound Number:    Ankle-Brachial Index:    Pulses:    Removal Indication and Assessment:    Wound Outcome:    (Retired) Wound Type:    (Retired) Wound Length (cm):    (Retired) Wound Width (cm):    (Retired) Depth (cm):    Wound Description (Comments):    Removal Indications:    Wound Image   07/18/22 1300   Dressing Appearance Intact;Moist drainage 07/18/22  1300   Drainage Amount Small 07/18/22 1300   Drainage Characteristics/Odor Serosanguineous 07/18/22 1300   Appearance Red;Moist 07/18/22 1300   Tissue loss description Partial thickness 07/18/22 1300   Red (%), Wound Tissue Color 100 % 07/18/22 1300   Periwound Area Dry;Edematous;Hemosiderin Staining 07/18/22 1300   Wound Edges Open 07/18/22 1300   Wound Length (cm) 1.4 cm 07/18/22 1300   Wound Width (cm) 1.5 cm 07/18/22 1300   Wound Depth (cm) 0.1 cm 07/18/22 1300   Wound Volume (cm^3) 0.21 cm^3 07/18/22 1300   Wound Surface Area (cm^2) 2.1 cm^2 07/18/22 1300   Care Cleansed with:;Sterile normal saline 07/18/22 1300   Dressing Applied;Hydrofiber;Cast padding;Elastic bandage;Tubular bandage 07/18/22 1300   Periwound Care Absorptive dressing applied;Moisturizer applied 07/18/22 1300   Compression Tubular elasticized bandage 07/18/22 1300   Dressing Change Due 07/21/22 07/18/22 1300            Wound 07/18/22 1335 Diabetic Ulcer Left distal Foot (Active)   07/18/22 1335    Pre-existing: Yes   Primary Wound Type: Diabetic ulc   Side: Left   Orientation: distal   Location: Foot   Wound Number:    Ankle-Brachial Index:    Pulses:    Removal Indication and Assessment:    Wound Outcome:    (Retired) Wound Type:    (Retired) Wound Length (cm):    (Retired) Wound Width (cm):    (Retired) Depth (cm):    Wound Description (Comments):    Removal Indications:    Wound Image   07/18/22 1300   Dressing Appearance Intact;Moist drainage 07/18/22 1300   Drainage Amount Small 07/18/22 1300   Drainage Characteristics/Odor Serosanguineous 07/18/22 1300   Appearance Pink;White;Wet 07/18/22 1300   Tissue loss description Full thickness 07/18/22 1300   Red (%), Wound Tissue Color 100 % 07/18/22 1300   Periwound Area Macerated;Hemosiderin Staining 07/18/22 1300   Wound Edges Undefined 07/18/22 1300   Peralta Classification (diabetic foot ulcers only) Grade 2 07/18/22 1300   Wound Length (cm) 0.5 cm 07/18/22 1300   Wound Width (cm) 1 cm  07/18/22 1300   Wound Depth (cm) 0.7 cm 07/18/22 1300   Wound Volume (cm^3) 0.35 cm^3 07/18/22 1300   Wound Surface Area (cm^2) 0.5 cm^2 07/18/22 1300   Care Cleansed with:;Sterile normal saline 07/18/22 1300   Dressing Applied;Hydrofiber;Cast padding;Elastic bandage;Tubular bandage 07/18/22 1300   Periwound Care Absorptive dressing applied;Moisturizer applied 07/18/22 1300   Compression Tubular elasticized bandage 07/18/22 1300   Dressing Change Due 07/21/22 07/18/22 1300         Assessment:         ICD-10-CM ICD-9-CM   1. Acute osteomyelitis of calcaneum, left  M86.172 730.07   2. Open wound of left lower leg, initial encounter  S81.802A 891.0   3. Peripheral arterial disease  I73.9 443.9   4. Type II diabetes mellitus with complication, uncontrolled  E11.8 250.92    E11.65    5. Abnormal EKG  R94.31 794.31   6. Bradycardia  R00.1 427.89   7. Pre-procedural cardiovascular examination  Z01.810 V72.81   8. Edema of leg  R60.0 782.3         Plan:   Tissue pathology and/or culture taken:  [] Yes [x] No   Sharp debridement performed:   [x] Yes [] No   Labs ordered this visit:   [x] Yes [] No   Imaging ordered this visit:   [x] Yes [] No           Orders Placed This Encounter   Procedures    X-Ray Chest PA And Lateral     Standing Status:   Future     Standing Expiration Date:   7/18/2023     Order Specific Question:   May the Radiologist modify the order per protocol to meet the clinical needs of the patient?     Answer:   Yes     Order Specific Question:   Release to patient     Answer:   Immediate    CBC auto differential     Standing Status:   Future     Standing Expiration Date:   9/16/2023    Comprehensive metabolic panel     Standing Status:   Future     Standing Expiration Date:   9/16/2023    Sedimentation rate     Standing Status:   Future     Standing Expiration Date:   9/16/2023    C-REACTIVE PROTEIN     Standing Status:   Future     Standing Expiration Date:   9/16/2023    Ambulatory referral/consult  to Cardiology     Standing Status:   Future     Standing Expiration Date:   8/18/2023     Referral Priority:   Routine     Referral Type:   Consultation     Referral Reason:   Specialty Services Required     Requested Specialty:   Cardiology     Number of Visits Requested:   1    Change dressing     Left anterior leg, distal foot, and heel  Cleanse wound with: Normal Saline   Lidocaine: PRN   Silver nitrate: PRN  Periwound care: Moisturizer to leg and foot  Primary dressing: Hydrofera ready  Secondary dressing: Abd pad to heel, cast padding x 2, tubigrip, flexinet  Offloading: Samy foam x 3  above heel wound, float heels  Edema control: Tubigrip G from foot to knee, Elevate LEs as often as possible  Frequency: 2 x per week  Follow-up: with Dr. Farrell in 4 weeks    Home Health:  [facility]:  to perform wound care twice weekly and PRN  Other Orders: Cardiology referral, chest x-ray, CRP, Sed rate, CMP, and CBC  Pending workup for HBO    EKG 12-lead     Standing Status:   Future     Standing Expiration Date:   7/18/2023        Follow up in about 4 weeks (around 8/15/2022).

## 2022-07-18 NOTE — PROGRESS NOTES
Subjective:      Patient ID: Saji Castañeda is a 72 y.o. male.    Chief Complaint: Foot Ulcer (Left foot heel) and Diabetes Mellitus (Pcp - Mart Escalante MD - )    Saji is a 72 y.o. male who presents to the clinic for evaluation and treatment of high risk feet. Saji has a past medical history of Arthritis, Diabetes mellitus, Diabetes mellitus, type 2, Hyperlipidemia, and Osteomyelitis. The patient's chief complaint is diabetic foot ulcer, L heel and leg . This patient has documented high risk feet requiring routine maintenance secondary to peripheral neuropathy.  No issues w/ abx   6/7/22: Saji Castañeda presebnts for f/u l heel ulcer. Has been lost to follow up since April. Patient currently  denies nausea, vomiting, fever, chills, fatigue, and  diarrhea. Blood sugars have been stable  6/15/22: Saji Castañeda Patient has been in football dressing/unna, x 1 week with out issues   7/13/22: Saji Castañeda presents f/u hospital d/c . Is currently at [facility]    PCP: Mart Escalante MD    Date Last Seen by PCP: Patient does not have a PCP or has not yet seen their PCP     Chief Complaint   Patient presents with    Foot Ulcer     Left foot heel    Diabetes Mellitus     Pcp - Mart Escalante MD -      History of Trauma: negative  Sign of Infection: none    Hemoglobin A1C   Date Value Ref Range Status   06/16/2022 9.5 (H) 4.0 - 5.6 % Final     Comment:     ADA Screening Guidelines:  5.7-6.4%  Consistent with prediabetes  >or=6.5%  Consistent with diabetes    High levels of fetal hemoglobin interfere with the HbA1C  assay. Heterozygous hemoglobin variants (HbS, HgC, etc)do  not significantly interfere with this assay.   However, presence of multiple variants may affect accuracy.     12/08/2020 7.9 (H) 4.0 - 5.6 % Final     Comment:     ADA Screening Guidelines:  5.7-6.4%  Consistent with prediabetes  >or=6.5%  Consistent with diabetes  High levels of fetal hemoglobin interfere with the HbA1C  assay.  "Heterozygous hemoglobin variants (HbS, HgC, etc)do  not significantly interfere with this assay.   However, presence of multiple variants may affect accuracy.     2019 9.8 (H) 4.0 - 5.6 % Final     Comment:     ADA Screening Guidelines:  5.7-6.4%  Consistent with prediabetes  >or=6.5%  Consistent with diabetes  High levels of fetal hemoglobin interfere with the HbA1C  assay. Heterozygous hemoglobin variants (HbS, HgC, etc)do  not significantly interfere with this assay.   However, presence of multiple variants may affect accuracy.         Review of Systems   Constitutional: Negative for chills and fever.   Cardiovascular: Positive for leg swelling. Negative for chest pain and claudication.   Respiratory: Negative for cough and shortness of breath.    Skin: Positive for color change, dry skin, nail changes (R foot only. L foot amputation) and poor wound healing (L leg and heel ulcers). Negative for flushing, itching and rash.   Musculoskeletal: Negative for back pain, falls, gout and joint pain.        L foot amputation (Choparts)    Gastrointestinal: Negative for nausea and vomiting.   Neurological: Positive for numbness and sensory change. Negative for paresthesias.   Psychiatric/Behavioral: Negative for altered mental status.           Objective:       Vitals:    22 1330   BP: (!) 185/72   Pulse: 72   Weight: 124.3 kg (274 lb 0.5 oz)   Height: 5' 9" (1.753 m)   PainSc: 0-No pain        Physical Exam  Vitals reviewed.   Constitutional:       Appearance: He is well-developed.   HENT:      Head: Normocephalic.   Cardiovascular:      Comments: DP/PT pulses diminished b/l CRT < 3 sec to tips of toes.    Pulmonary:      Effort: No respiratory distress.   Musculoskeletal:      Right lower le+ Edema present.      Left lower le+ Edema present.      Comments: L foot chopart's amputation.      Skin:     General: Skin is warm and dry.      Coloration: Skin is not jaundiced or pale.      Findings: No " bruising or erythema.       Diminished pedal hair b/l.     Ulcer L heel: 5.0x2.0x0.1  Depth: bone  Periphery: hyperkeratotic rim w/ + maceration  Base: granular   Drainage: Serosanguinous    Neurological:      Mental Status: He is alert and oriented to person, place, and time.      Sensory: Sensory deficit present.      Comments: Light touch, proprioception, and sharp/dull sensation are all intact bilaterally. Protective threshold with the [place]-Wienstein monofilament is diminished bilaterally.    Psychiatric:         Behavior: Behavior normal.         Thought Content: Thought content normal.         Judgment: Judgment normal.                   Assessment:       Encounter Diagnoses   Name Primary?    Type II diabetes mellitus with neurological manifestations Yes    PVD (peripheral vascular disease)     Heel ulcer, left, with necrosis of bone     Leg swelling     Peripheral arterial disease              Plan:       Saji was seen today for foot ulcer and diabetes mellitus.    Diagnoses and all orders for this visit:    Type II diabetes mellitus with neurological manifestations    PVD (peripheral vascular disease)    Heel ulcer, left, with necrosis of bone    Leg swelling    Peripheral arterial disease      I counseled the patient on his conditions, their implications and medical management.  L leg swelling stabilized since d/c   L heel ulcer improved   area deeply cleansed with saline moistened gauze   HB foam   Katarzyna Arteaga assisted w/ application of football dressing under my direct supervision. Darco shoe applied to offload the area. Advised patient that this should be worn on the affected foot at all times when ambulating

## 2022-07-18 NOTE — PROCEDURES
"Debridement    Date/Time: 7/18/2022 12:59 PM  Performed by: Anuja Farrell DO  Authorized by: Anuja Farrell DO     Time out: Immediately prior to procedure a "time out" was called to verify the correct patient, procedure, equipment, support staff and site/side marked as required.    Consent Done?:  Yes (Written)  Local anesthesia used?: Yes    Local anesthetic:  Topical anesthetic    Debridement - 1st Wound - General Location: left heel.    Type of Debridement:  Excisional       Length (cm):  4.8       Area (sq cm):  16.8       Width (cm):  3.5       Percent Debrided (%):  25       Depth (cm):  0.6       Total Area Debrided (sq cm):  4.2    Depth of debridement:  Bone    Tissue debrided:  Bone and Subcutaneous    Devitalized tissue debrided:  Slough, Exudate, Biofilm and Fibrin    Instruments:  Forceps and Scissors    Bleeding:  Minimal  Hemostasis Achieved: Yes    Method Used:  Pressure  Patient tolerance:  Patient tolerated the procedure well with no immediate complications      "

## 2022-07-20 ENCOUNTER — OFFICE VISIT (OUTPATIENT)
Dept: PODIATRY | Facility: CLINIC | Age: 72
End: 2022-07-20
Payer: MEDICARE

## 2022-07-20 VITALS — HEIGHT: 69 IN | TEMPERATURE: 98 F | RESPIRATION RATE: 18 BRPM | BODY MASS INDEX: 40.58 KG/M2 | WEIGHT: 274 LBS

## 2022-07-20 DIAGNOSIS — E11.69 DIABETIC FOOT ULCER WITH OSTEOMYELITIS: ICD-10-CM

## 2022-07-20 DIAGNOSIS — I73.9 PVD (PERIPHERAL VASCULAR DISEASE): ICD-10-CM

## 2022-07-20 DIAGNOSIS — L97.529 ULCER OF LEFT FOOT, UNSPECIFIED ULCER STAGE: ICD-10-CM

## 2022-07-20 DIAGNOSIS — E11.49 TYPE II DIABETES MELLITUS WITH NEUROLOGICAL MANIFESTATIONS: ICD-10-CM

## 2022-07-20 DIAGNOSIS — L97.424 HEEL ULCER, LEFT, WITH NECROSIS OF BONE: Primary | ICD-10-CM

## 2022-07-20 DIAGNOSIS — M86.9 DIABETIC FOOT ULCER WITH OSTEOMYELITIS: ICD-10-CM

## 2022-07-20 DIAGNOSIS — E11.621 DIABETIC FOOT ULCER WITH OSTEOMYELITIS: ICD-10-CM

## 2022-07-20 DIAGNOSIS — M79.89 LEG SWELLING: ICD-10-CM

## 2022-07-20 DIAGNOSIS — L97.509 DIABETIC FOOT ULCER WITH OSTEOMYELITIS: ICD-10-CM

## 2022-07-20 PROCEDURE — 87075 CULTR BACTERIA EXCEPT BLOOD: CPT | Performed by: PODIATRIST

## 2022-07-20 PROCEDURE — 4010F ACE/ARB THERAPY RXD/TAKEN: CPT | Mod: CPTII,S$GLB,, | Performed by: PODIATRIST

## 2022-07-20 PROCEDURE — 1111F DSCHRG MED/CURRENT MED MERGE: CPT | Mod: CPTII,S$GLB,, | Performed by: PODIATRIST

## 2022-07-20 PROCEDURE — 87077 CULTURE AEROBIC IDENTIFY: CPT | Performed by: PODIATRIST

## 2022-07-20 PROCEDURE — 99999 PR PBB SHADOW E&M-EST. PATIENT-LVL IV: ICD-10-PCS | Mod: PBBFAC,,, | Performed by: PODIATRIST

## 2022-07-20 PROCEDURE — 11044 PR DEBRIDEMENT, SKIN, SUB-Q TISSUE,MUSCLE,BONE,=<20 SQ CM: ICD-10-PCS | Mod: S$GLB,,, | Performed by: PODIATRIST

## 2022-07-20 PROCEDURE — 87070 CULTURE OTHR SPECIMN AEROBIC: CPT | Performed by: PODIATRIST

## 2022-07-20 PROCEDURE — 4010F PR ACE/ARB THEARPY RXD/TAKEN: ICD-10-PCS | Mod: CPTII,S$GLB,, | Performed by: PODIATRIST

## 2022-07-20 PROCEDURE — 1159F MED LIST DOCD IN RCRD: CPT | Mod: CPTII,S$GLB,, | Performed by: PODIATRIST

## 2022-07-20 PROCEDURE — 99214 OFFICE O/P EST MOD 30 MIN: CPT | Mod: 25,S$GLB,, | Performed by: PODIATRIST

## 2022-07-20 PROCEDURE — 1126F AMNT PAIN NOTED NONE PRSNT: CPT | Mod: CPTII,S$GLB,, | Performed by: PODIATRIST

## 2022-07-20 PROCEDURE — 99999 PR PBB SHADOW E&M-EST. PATIENT-LVL IV: CPT | Mod: PBBFAC,,, | Performed by: PODIATRIST

## 2022-07-20 PROCEDURE — 3046F PR MOST RECENT HEMOGLOBIN A1C LEVEL > 9.0%: ICD-10-PCS | Mod: CPTII,S$GLB,, | Performed by: PODIATRIST

## 2022-07-20 PROCEDURE — 3008F PR BODY MASS INDEX (BMI) DOCUMENTED: ICD-10-PCS | Mod: CPTII,S$GLB,, | Performed by: PODIATRIST

## 2022-07-20 PROCEDURE — 3046F HEMOGLOBIN A1C LEVEL >9.0%: CPT | Mod: CPTII,S$GLB,, | Performed by: PODIATRIST

## 2022-07-20 PROCEDURE — 87186 SC STD MICRODIL/AGAR DIL: CPT | Performed by: PODIATRIST

## 2022-07-20 PROCEDURE — 1159F PR MEDICATION LIST DOCUMENTED IN MEDICAL RECORD: ICD-10-PCS | Mod: CPTII,S$GLB,, | Performed by: PODIATRIST

## 2022-07-20 PROCEDURE — 1111F PR DISCHARGE MEDS RECONCILED W/ CURRENT OUTPATIENT MED LIST: ICD-10-PCS | Mod: CPTII,S$GLB,, | Performed by: PODIATRIST

## 2022-07-20 PROCEDURE — 99214 PR OFFICE/OUTPT VISIT, EST, LEVL IV, 30-39 MIN: ICD-10-PCS | Mod: 25,S$GLB,, | Performed by: PODIATRIST

## 2022-07-20 PROCEDURE — 1126F PR PAIN SEVERITY QUANTIFIED, NO PAIN PRESENT: ICD-10-PCS | Mod: CPTII,S$GLB,, | Performed by: PODIATRIST

## 2022-07-20 PROCEDURE — 11044 DBRDMT BONE 1ST 20 SQ CM/<: CPT | Mod: S$GLB,,, | Performed by: PODIATRIST

## 2022-07-20 PROCEDURE — 3008F BODY MASS INDEX DOCD: CPT | Mod: CPTII,S$GLB,, | Performed by: PODIATRIST

## 2022-07-20 NOTE — PROGRESS NOTES
Subjective:      Patient ID: Saji Castañeda is a 72 y.o. male.    Chief Complaint: Wound Care (Foot ulcer ) and Dressing Change    Saji is a 72 y.o. male who presents to the clinic for evaluation and treatment of high risk feet. Saji has a past medical history of Arthritis, Diabetes mellitus, Diabetes mellitus, type 2, Hyperlipidemia, and Osteomyelitis. The patient's chief complaint is diabetic foot ulcer, L heel and leg . This patient has documented high risk feet requiring routine maintenance secondary to peripheral neuropathy.  No issues w/ abx   6/7/22: Saji Castañeda presebnts for f/u l heel ulcer. Has been lost to follow up since April. Patient currently  denies nausea, vomiting, fever, chills, fatigue, and  diarrhea. Blood sugars have been stable  6/15/22: Saji Castañeda Patient has been in football dressing/unna, x 1 week with out issues   7/13/22: Saji Castañeda presents f/u hospital d/c . Is currently at [facility]  7/20/22: seen 2 days ago at [facility]. Is being worked up for hbot    PCP: Mart Escalante MD    Date Last Seen by PCP: Patient does not have a PCP or has not yet seen their PCP     Chief Complaint   Patient presents with    Wound Care     Foot ulcer     Dressing Change     History of Trauma: negative  Sign of Infection: none    Hemoglobin A1C   Date Value Ref Range Status   06/16/2022 9.5 (H) 4.0 - 5.6 % Final     Comment:     ADA Screening Guidelines:  5.7-6.4%  Consistent with prediabetes  >or=6.5%  Consistent with diabetes    High levels of fetal hemoglobin interfere with the HbA1C  assay. Heterozygous hemoglobin variants (HbS, HgC, etc)do  not significantly interfere with this assay.   However, presence of multiple variants may affect accuracy.     12/08/2020 7.9 (H) 4.0 - 5.6 % Final     Comment:     ADA Screening Guidelines:  5.7-6.4%  Consistent with prediabetes  >or=6.5%  Consistent with diabetes  High levels of fetal hemoglobin interfere with the HbA1C  assay.  "Heterozygous hemoglobin variants (HbS, HgC, etc)do  not significantly interfere with this assay.   However, presence of multiple variants may affect accuracy.     2019 9.8 (H) 4.0 - 5.6 % Final     Comment:     ADA Screening Guidelines:  5.7-6.4%  Consistent with prediabetes  >or=6.5%  Consistent with diabetes  High levels of fetal hemoglobin interfere with the HbA1C  assay. Heterozygous hemoglobin variants (HbS, HgC, etc)do  not significantly interfere with this assay.   However, presence of multiple variants may affect accuracy.         Review of Systems   Constitutional: Negative for chills and fever.   Cardiovascular: Positive for leg swelling. Negative for chest pain and claudication.   Respiratory: Negative for cough and shortness of breath.    Skin: Positive for color change, dry skin, nail changes (R foot only. L foot amputation) and poor wound healing (L leg and heel ulcers). Negative for flushing, itching and rash.   Musculoskeletal: Negative for back pain, falls, gout and joint pain.        L foot amputation (Choparts)    Gastrointestinal: Negative for nausea and vomiting.   Neurological: Positive for numbness and sensory change. Negative for paresthesias.   Psychiatric/Behavioral: Negative for altered mental status.           Objective:       Vitals:    22 1311   Resp: 18   Temp: 98.2 °F (36.8 °C)   TempSrc: Oral   Weight: 124.3 kg (274 lb)   Height: 5' 9" (1.753 m)   PainSc: 0-No pain        Physical Exam  Vitals reviewed.   Constitutional:       Appearance: He is well-developed.   HENT:      Head: Normocephalic.   Cardiovascular:      Comments: DP/PT pulses diminished b/l CRT < 3 sec to tips of toes.    Pulmonary:      Effort: No respiratory distress.   Musculoskeletal:      Right lower le+ Edema present.      Left lower le+ Edema present.      Comments: L foot chopart's amputation.      Skin:     General: Skin is warm and dry.      Coloration: Skin is not jaundiced or pale.      " Findings: No bruising or erythema.       Diminished pedal hair b/l.     Ulcer L heel:5.3x4.0x0.2  Depth: bone  Periphery: hyperkeratotic rim w/ ++ maceration, + malodor  Base: granular   Drainage: Serosanguinous- greenish    Ulcer location:  left, dorsal distal amp    Measurements: 0.8x1.7x0.2 cm   Signs of infection: No  Erythema: No  Undermining: No  Drainage: Bloody  Periwound skin: intact and hyperkeratotic  Wound Base: granular      Neurological:      Mental Status: He is alert and oriented to person, place, and time.      Sensory: Sensory deficit present.      Comments: Light touch, proprioception, and sharp/dull sensation are all intact bilaterally. Protective threshold with the [place]-Wienstein monofilament is diminished bilaterally.    Psychiatric:         Behavior: Behavior normal.         Thought Content: Thought content normal.         Judgment: Judgment normal.                   7.20.22          Assessment:       Encounter Diagnoses   Name Primary?    Heel ulcer, left, with necrosis of bone Yes    Type II diabetes mellitus with neurological manifestations     PVD (peripheral vascular disease)     Diabetic foot ulcer with osteomyelitis     Leg swelling     Ulcer of left foot, unspecified ulcer stage              Plan:       Saji was seen today for wound care and dressing change.    Diagnoses and all orders for this visit:    Heel ulcer, left, with necrosis of bone  -     Aerobic culture  -     Culture, Anaerobic    Type II diabetes mellitus with neurological manifestations    PVD (peripheral vascular disease)    Diabetic foot ulcer with osteomyelitis    Leg swelling    Ulcer of left foot, unspecified ulcer stage      I counseled the patient on his conditions, their implications and medical management.  L leg swelling increased   New ulceration distal amp site, ? 2/2 increased activity w/ PT ?  Increased posterior heel drainage ( greenish color, suspect pseudomonads) , + malodor , increased size,  + exposed bone today   Aerobic/anaerobic cultures obtained from wound. Prescription written for broad spectrum abx, will monitor results and tailor abx as needed.   area deeply cleansed with vashe moistened gauze   Nonviable tissues were debrided beyond the level of bone utilizing a  sterile No. 15 scalpel and forceps. Minimal bleeding controlled with direct pressure  The patient tolerated this well.     Continue current IV abx, will monitor cx and adjust abx prn  HB foam to L foot/leg ulcers x 3  Katarzyna Arteaga assisted w/ application of football dressing under my direct supervision. Darco shoe applied to offload the area. Advised patient that this should be worn on the affected foot at all times when ambulating   Patient dispensed Tubigrip and instructed on use to assist with edema control

## 2022-07-24 LAB — BACTERIA SPEC AEROBE CULT: ABNORMAL

## 2022-07-25 LAB — BACTERIA SPEC ANAEROBE CULT: NORMAL

## 2022-07-27 ENCOUNTER — OFFICE VISIT (OUTPATIENT)
Dept: INFECTIOUS DISEASES | Facility: CLINIC | Age: 72
End: 2022-07-27
Payer: MEDICARE

## 2022-07-27 ENCOUNTER — OFFICE VISIT (OUTPATIENT)
Dept: PODIATRY | Facility: CLINIC | Age: 72
End: 2022-07-27
Payer: MEDICARE

## 2022-07-27 ENCOUNTER — TELEPHONE (OUTPATIENT)
Dept: INFECTIOUS DISEASES | Facility: CLINIC | Age: 72
End: 2022-07-27
Payer: MEDICARE

## 2022-07-27 VITALS
HEIGHT: 69 IN | WEIGHT: 274 LBS | BODY MASS INDEX: 40.58 KG/M2 | DIASTOLIC BLOOD PRESSURE: 56 MMHG | HEART RATE: 50 BPM | RESPIRATION RATE: 18 BRPM | SYSTOLIC BLOOD PRESSURE: 129 MMHG

## 2022-07-27 DIAGNOSIS — M86.672 CHRONIC OSTEOMYELITIS INVOLVING ANKLE AND FOOT, LEFT: Primary | ICD-10-CM

## 2022-07-27 DIAGNOSIS — L97.424 HEEL ULCER, LEFT, WITH NECROSIS OF BONE: ICD-10-CM

## 2022-07-27 DIAGNOSIS — I73.9 PVD (PERIPHERAL VASCULAR DISEASE): ICD-10-CM

## 2022-07-27 DIAGNOSIS — L89.624 PRESSURE INJURY OF LEFT HEEL, STAGE 4: ICD-10-CM

## 2022-07-27 DIAGNOSIS — Z79.2 RECEIVING INTRAVENOUS ANTIBIOTIC TREATMENT AS OUTPATIENT: ICD-10-CM

## 2022-07-27 DIAGNOSIS — E11.49 TYPE II DIABETES MELLITUS WITH NEUROLOGICAL MANIFESTATIONS: Primary | ICD-10-CM

## 2022-07-27 DIAGNOSIS — M79.89 LEG SWELLING: ICD-10-CM

## 2022-07-27 PROCEDURE — 3046F PR MOST RECENT HEMOGLOBIN A1C LEVEL > 9.0%: ICD-10-PCS | Mod: CPTII,S$GLB,, | Performed by: PODIATRIST

## 2022-07-27 PROCEDURE — 99999 PR PBB SHADOW E&M-EST. PATIENT-LVL IV: CPT | Mod: PBBFAC,,, | Performed by: PODIATRIST

## 2022-07-27 PROCEDURE — 3046F HEMOGLOBIN A1C LEVEL >9.0%: CPT | Mod: CPTII,S$GLB,, | Performed by: PODIATRIST

## 2022-07-27 PROCEDURE — 99499 NO LOS: ICD-10-PCS | Mod: S$GLB,,, | Performed by: PODIATRIST

## 2022-07-27 PROCEDURE — 3074F PR MOST RECENT SYSTOLIC BLOOD PRESSURE < 130 MM HG: ICD-10-PCS | Mod: CPTII,S$GLB,, | Performed by: PODIATRIST

## 2022-07-27 PROCEDURE — 99499 UNLISTED E&M SERVICE: CPT | Mod: S$GLB,,, | Performed by: PODIATRIST

## 2022-07-27 PROCEDURE — 1126F PR PAIN SEVERITY QUANTIFIED, NO PAIN PRESENT: ICD-10-PCS | Mod: CPTII,S$GLB,, | Performed by: PODIATRIST

## 2022-07-27 PROCEDURE — 3078F DIAST BP <80 MM HG: CPT | Mod: CPTII,S$GLB,, | Performed by: PODIATRIST

## 2022-07-27 PROCEDURE — 11044 PR DEBRIDEMENT, SKIN, SUB-Q TISSUE,MUSCLE,BONE,=<20 SQ CM: ICD-10-PCS | Mod: S$GLB,,, | Performed by: PODIATRIST

## 2022-07-27 PROCEDURE — 1159F PR MEDICATION LIST DOCUMENTED IN MEDICAL RECORD: ICD-10-PCS | Mod: CPTII,S$GLB,, | Performed by: PODIATRIST

## 2022-07-27 PROCEDURE — 4010F PR ACE/ARB THEARPY RXD/TAKEN: ICD-10-PCS | Mod: CPTII,S$GLB,, | Performed by: PODIATRIST

## 2022-07-27 PROCEDURE — 99999 PR PBB SHADOW E&M-EST. PATIENT-LVL IV: ICD-10-PCS | Mod: PBBFAC,,, | Performed by: PODIATRIST

## 2022-07-27 PROCEDURE — 11044 DBRDMT BONE 1ST 20 SQ CM/<: CPT | Mod: S$GLB,,, | Performed by: PODIATRIST

## 2022-07-27 PROCEDURE — 4010F ACE/ARB THERAPY RXD/TAKEN: CPT | Mod: CPTII,S$GLB,, | Performed by: PHYSICIAN ASSISTANT

## 2022-07-27 PROCEDURE — 3078F PR MOST RECENT DIASTOLIC BLOOD PRESSURE < 80 MM HG: ICD-10-PCS | Mod: CPTII,S$GLB,, | Performed by: PODIATRIST

## 2022-07-27 PROCEDURE — 99214 PR OFFICE/OUTPT VISIT, EST, LEVL IV, 30-39 MIN: ICD-10-PCS | Mod: S$GLB,,, | Performed by: PHYSICIAN ASSISTANT

## 2022-07-27 PROCEDURE — 99214 OFFICE O/P EST MOD 30 MIN: CPT | Mod: S$GLB,,, | Performed by: PHYSICIAN ASSISTANT

## 2022-07-27 PROCEDURE — 3046F HEMOGLOBIN A1C LEVEL >9.0%: CPT | Mod: CPTII,S$GLB,, | Performed by: PHYSICIAN ASSISTANT

## 2022-07-27 PROCEDURE — 1126F AMNT PAIN NOTED NONE PRSNT: CPT | Mod: CPTII,S$GLB,, | Performed by: PODIATRIST

## 2022-07-27 PROCEDURE — 3008F PR BODY MASS INDEX (BMI) DOCUMENTED: ICD-10-PCS | Mod: CPTII,S$GLB,, | Performed by: PODIATRIST

## 2022-07-27 PROCEDURE — 1159F MED LIST DOCD IN RCRD: CPT | Mod: CPTII,S$GLB,, | Performed by: PODIATRIST

## 2022-07-27 PROCEDURE — 1111F PR DISCHARGE MEDS RECONCILED W/ CURRENT OUTPATIENT MED LIST: ICD-10-PCS | Mod: CPTII,S$GLB,, | Performed by: PHYSICIAN ASSISTANT

## 2022-07-27 PROCEDURE — 4010F ACE/ARB THERAPY RXD/TAKEN: CPT | Mod: CPTII,S$GLB,, | Performed by: PODIATRIST

## 2022-07-27 PROCEDURE — 1111F DSCHRG MED/CURRENT MED MERGE: CPT | Mod: CPTII,S$GLB,, | Performed by: PHYSICIAN ASSISTANT

## 2022-07-27 PROCEDURE — 4010F PR ACE/ARB THEARPY RXD/TAKEN: ICD-10-PCS | Mod: CPTII,S$GLB,, | Performed by: PHYSICIAN ASSISTANT

## 2022-07-27 PROCEDURE — 3046F PR MOST RECENT HEMOGLOBIN A1C LEVEL > 9.0%: ICD-10-PCS | Mod: CPTII,S$GLB,, | Performed by: PHYSICIAN ASSISTANT

## 2022-07-27 PROCEDURE — 3074F SYST BP LT 130 MM HG: CPT | Mod: CPTII,S$GLB,, | Performed by: PODIATRIST

## 2022-07-27 PROCEDURE — 3008F BODY MASS INDEX DOCD: CPT | Mod: CPTII,S$GLB,, | Performed by: PODIATRIST

## 2022-07-27 NOTE — TELEPHONE ENCOUNTER
Spoke with Racquel @ [facility] [phone].  Instructed to continue Dapto x 2 more weeks but to also add Cefepime 2 gms q 12 hours x 2 wks.  Also asked for labs to be sent to our office and gave fax number [phone].

## 2022-07-27 NOTE — PROGRESS NOTES
Subjective:      Patient ID: Saji Castañeda is a 72 y.o. male.    Chief Complaint: Follow-up (Wound care ), Dressing Change, and Foot Problem (Drainage )    Saji is a 72 y.o. male who presents to the clinic for evaluation and treatment of high risk feet. Saji has a past medical history of Arthritis, Diabetes mellitus, Diabetes mellitus, type 2, Hyperlipidemia, and Osteomyelitis. The patient's chief complaint is diabetic foot ulcer, L heel and leg . This patient has documented high risk feet requiring routine maintenance secondary to peripheral neuropathy.  No issues w/ abx   6/7/22: Saji Castañeda presebnts for f/u l heel ulcer. Has been lost to follow up since April. Patient currently  denies nausea, vomiting, fever, chills, fatigue, and  diarrhea. Blood sugars have been stable  6/15/22: Saji Castañeda Patient has been in football dressing/unna, x 1 week with out issues   7/13/22: Saji Castañeda presents f/u hospital d/c . Is currently at [facility]  7/20/22: seen 2 days ago at [facility]. Is being worked up for hbot  7/27/22: reports new onset R heel pain. worse when in bed    PCP: Mart Escalante MD    Date Last Seen by PCP: Patient does not have a PCP or has not yet seen their PCP     Chief Complaint   Patient presents with    Follow-up     Wound care     Dressing Change    Foot Problem     Drainage      History of Trauma: negative  Sign of Infection: none    Hemoglobin A1C   Date Value Ref Range Status   06/16/2022 9.5 (H) 4.0 - 5.6 % Final     Comment:     ADA Screening Guidelines:  5.7-6.4%  Consistent with prediabetes  >or=6.5%  Consistent with diabetes    High levels of fetal hemoglobin interfere with the HbA1C  assay. Heterozygous hemoglobin variants (HbS, HgC, etc)do  not significantly interfere with this assay.   However, presence of multiple variants may affect accuracy.     12/08/2020 7.9 (H) 4.0 - 5.6 % Final     Comment:     ADA Screening Guidelines:  5.7-6.4%  Consistent with  "prediabetes  >or=6.5%  Consistent with diabetes  High levels of fetal hemoglobin interfere with the HbA1C  assay. Heterozygous hemoglobin variants (HbS, HgC, etc)do  not significantly interfere with this assay.   However, presence of multiple variants may affect accuracy.     2019 9.8 (H) 4.0 - 5.6 % Final     Comment:     ADA Screening Guidelines:  5.7-6.4%  Consistent with prediabetes  >or=6.5%  Consistent with diabetes  High levels of fetal hemoglobin interfere with the HbA1C  assay. Heterozygous hemoglobin variants (HbS, HgC, etc)do  not significantly interfere with this assay.   However, presence of multiple variants may affect accuracy.         Review of Systems   Constitutional: Negative for chills and fever.   Cardiovascular: Positive for leg swelling. Negative for chest pain and claudication.   Respiratory: Negative for cough and shortness of breath.    Skin: Positive for color change, dry skin, nail changes (R foot only. L foot amputation) and poor wound healing (L leg and heel ulcers). Negative for flushing, itching and rash.   Musculoskeletal: Negative for back pain, falls, gout and joint pain.        L foot amputation (Choparts)    Gastrointestinal: Negative for nausea and vomiting.   Neurological: Positive for numbness and sensory change. Negative for paresthesias.   Psychiatric/Behavioral: Negative for altered mental status.           Objective:       Vitals:    22 1314   BP: (!) 129/56   Pulse: (!) 50   Resp: 18   Weight: 124.3 kg (274 lb)   Height: 5' 9" (1.753 m)   PainSc: 0-No pain        Physical Exam  Vitals reviewed.   Constitutional:       Appearance: He is well-developed.   HENT:      Head: Normocephalic.   Cardiovascular:      Comments: DP/PT pulses diminished b/l CRT < 3 sec to tips of toes.    Pulmonary:      Effort: No respiratory distress.   Musculoskeletal:      Right lower le+ Edema present.      Left lower le+ Edema present.      Comments: L foot chopart's " amputation.      Skin:     General: Skin is warm and dry.      Coloration: Skin is not jaundiced or pale.      Findings: No bruising or erythema.       Diminished pedal hair b/l.     Ulcer L heel:5.4x3.8x0.3  Depth: bone  Periphery: hyperkeratotic rim w/ ++ maceration, + malodor  Base: granular   Drainage: Serosanguinous- greenish    Ulcer location:  left, dorsal distal amp    Measurements: 2.3x1.5x0.4 cm   Signs of infection: No  Erythema: No  Undermining: No  Drainage: Bloody  Periwound skin: intact and hyperkeratotic  Wound Base: granular      Neurological:      Mental Status: He is alert and oriented to person, place, and time.      Sensory: Sensory deficit present.      Comments: Light touch, proprioception, and sharp/dull sensation are all intact bilaterally. Protective threshold with the [place]ien[place] monofilament is diminished bilaterally.    Psychiatric:         Behavior: Behavior normal.         Thought Content: Thought content normal.         Judgment: Judgment normal.                                     Assessment:       Encounter Diagnoses   Name Primary?    Type II diabetes mellitus with neurological manifestations Yes    PVD (peripheral vascular disease)     Heel ulcer, left, with necrosis of bone     Leg swelling              Plan:       Saji was seen today for follow-up, dressing change and foot problem.    Diagnoses and all orders for this visit:    Type II diabetes mellitus with neurological manifestations    PVD (peripheral vascular disease)    Heel ulcer, left, with necrosis of bone    Leg swelling      I counseled the patient on his conditions, their implications and medical management.  L leg swelling  Still present w/ new superficial skin abrasions likely 2/2 edema   Exposed calc again observed   Kimmy  with the Infectious Disease department evaluated patient in the clinic along with me today. Their input is greatly appreciated.    Plan to add IV Cefepime to cover GNR from last   weeks wound swab   Nonviable tissues were debrided beyond the level of bone utilizing a  sterile No. 15 scalpel and forceps. Minimal bleeding controlled with direct pressure  The patient tolerated this well.   HB foam to L foot/leg ulcers x 3  Katarzyna Arteaga assisted w/ application of football dressing under my direct supervision. Darco shoe applied to offload the area. Advised patient that this should be worn on the affected foot at all times when ambulating   Patient dispensed Tubigrip and instructed on use to assist with edema control    R heel pain appears 2/2 pressure while in bed. Pt should be dispensed multi podus boot by facility and instructed on proper usage

## 2022-07-27 NOTE — PROGRESS NOTES
Subjective:      Patient ID: Saji Castañeda is a 72 y.o. male.    Chief Complaint: osteomyelitis     History of Present Illness    HPI    Mr. Julien is a 72 year old male with history of DM2 with peripheral neuropathy, left midtarsal amputation 2010, left calcaneal osteomyelitis s/p treatment in the past who was admitted in June for infected left heel ulceration. Tib/fib xray of the left leg notable for osteomyelitis of the posterior calcaneus. Vascular studies were concerning for left lower leg arterial stenosis and calf vessel narrowing/occulsions. Wound culture returned positive for GBS, MRSA, prevotella and porphromonas. Bone culture grew Staph epidermidis. Bone never sent for pathology. Vascular surgery recommended AKA vs BKA. Patient decided to attempt abx and deferred surgery.  He was discharged to [facility] on Daptomycin x 6 weeks (end 7/28) and PO Flagyl x 14 days.    He is seen today for follow up in podiatry clinic. Patient is tolerating antibiotics well. Denies fevers, chills, sweats. Denies PICC issues. Reports increased LLE swelling and skin breakdown 2/2 swelling. Also has new stump wound.  He last saw podiatry on 7/20. During that visit, increased green colored heel drainage was noted. Bone exposed. Wound culture obtained show E. cloacae (cipro R).    Review of Systems   Constitutional: Negative for chills, fever, malaise/fatigue and night sweats.   Cardiovascular: Positive for leg swelling. Negative for chest pain.   Respiratory: Negative for cough, shortness of breath and sputum production.    Skin: Positive for color change, dry skin, nail changes and poor wound healing. Negative for itching.   Musculoskeletal: Negative for back pain, joint pain and joint swelling.   Gastrointestinal: Negative for nausea and vomiting.   Genitourinary: Negative for dysuria and flank pain.   Neurological: Positive for numbness. Negative for dizziness and weakness.   Psychiatric/Behavioral: Negative for  altered mental status and depression. The patient is not nervous/anxious.      Objective:   Physical Exam  Constitutional:       General: He is not in acute distress.     Appearance: Normal appearance. He is well-developed. He is obese. He is not ill-appearing, toxic-appearing or diaphoretic.   HENT:      Head: Normocephalic and atraumatic.   Eyes:      General: No scleral icterus.        Right eye: No discharge.         Left eye: No discharge.   Cardiovascular:      Rate and Rhythm: Normal rate and regular rhythm.   Pulmonary:      Effort: Pulmonary effort is normal. No respiratory distress.      Breath sounds: Normal breath sounds.   Abdominal:      General: There is no distension.      Palpations: Abdomen is soft.      Tenderness: There is no abdominal tenderness.   Musculoskeletal:         General: Deformity (L foot amputation) present. No tenderness.      Left lower leg: Edema present.      Comments: Left heel wound with granular base and exposed bone. periwound maceration and drainage.   New wound at stump site.  Increased LLE swelling associated with superficial skin breakdown.   Skin:     General: Skin is warm and dry.      Findings: No erythema or rash.      Comments: Chronic skin changes bilateral lower extremities. Dry flaky skin.  PICC c/d/i   Neurological:      General: No focal deficit present.      Mental Status: He is alert and oriented to person, place, and time.      Sensory: Sensory deficit present.   Psychiatric:         Mood and Affect: Mood normal.         Behavior: Behavior normal.         Thought Content: Thought content normal.                    Assessment:       1. Chronic osteomyelitis involving ankle and foot, left    2. Pressure injury of left heel, stage 4    3. Receiving intravenous antibiotic treatment as outpatient          Plan:   · Extend Daptomycin 8mg/kg IV q24h for two more weeks  · Start IV Cefepime for E. Cloacae treatment. Plan for at minimum two weeks of therapy though  ultimate duration TBD pending clinical response  · Updated antibiotics orders sent to [facility].  · No recent labs available for review. Patient reports getting weekly lab draws.  Will have facility fax over all recent lab work. Will need weekly CBC, CMP, CRP and CPK while on IV antibiotics   · Continue aggressive wound care per podiatry. Follow up with Dr. Wu for evaluation for vascular salvage treatment. If not a candidate for revascularization, there is concerns for efficacy of IV antibiotics in setting of vascular disease and ultimately may need amputation if he fails to improve.  · Will plan to transition to oral antibiotics for a total duration of 6 months for chronic osteomyelitis or until bone is covered   · Discussed plan with Dr. Kenyon. RTC in 2 weeks        30 minutes of total time was spent on this encounter, which includes face to face time and non-face to face time preparing to see the patient (eg, review of tests), Obtaining and/or reviewing separately obtained history, documenting clinical information in the electronic or other health record, independently interpreting results (not separately reported) and communicating results to the patient/family/caregiver, or care coordination (not separately reported).

## 2022-07-28 ENCOUNTER — TELEPHONE (OUTPATIENT)
Dept: PODIATRY | Facility: CLINIC | Age: 72
End: 2022-07-28
Payer: MEDICARE

## 2022-07-28 ENCOUNTER — TELEPHONE (OUTPATIENT)
Dept: INFECTIOUS DISEASES | Facility: CLINIC | Age: 72
End: 2022-07-28
Payer: MEDICARE

## 2022-07-28 NOTE — TELEPHONE ENCOUNTER
Received call from [facility] requesting order be re-faxed (extend IV abx).      Order re-faxed to Fx# [phone], called Brandy (cell# [phone] and advised sent) - [facility] to call back if has any questions.

## 2022-07-28 NOTE — TELEPHONE ENCOUNTER
----- Message from Melba Lora sent at 7/28/2022  8:19 AM CDT -----  Regarding: Chart Notes Needed 07/27/22  Contact: Alfred ([facility])[phone]  Caller (Alfred) from [facility] requesting Chart Notes from 07/27/22 visit.  Please fax Chart Notes to [phone].  Please call to discuss further.

## 2022-08-02 ENCOUNTER — TELEPHONE (OUTPATIENT)
Dept: ADMINISTRATIVE | Facility: OTHER | Age: 72
End: 2022-08-02
Payer: MEDICARE

## 2022-08-19 ENCOUNTER — TELEPHONE (OUTPATIENT)
Dept: PODIATRY | Facility: CLINIC | Age: 72
End: 2022-08-19
Payer: MEDICARE

## 2022-08-19 NOTE — TELEPHONE ENCOUNTER
----- Message from Lety Nicholson sent at 8/19/2022 12:28 PM CDT -----  Regarding: Requesting a call back  Contact: pt @[phone]  Pt is requesting a call back regarding scheduling appt for wound on left  foot .pt states he has been in rehab and would like a follow up . Please call to discuss further

## 2022-08-25 ENCOUNTER — OFFICE VISIT (OUTPATIENT)
Dept: PODIATRY | Facility: CLINIC | Age: 72
End: 2022-08-25
Payer: MEDICARE

## 2022-08-25 VITALS
HEIGHT: 69 IN | HEART RATE: 44 BPM | RESPIRATION RATE: 18 BRPM | WEIGHT: 274 LBS | DIASTOLIC BLOOD PRESSURE: 56 MMHG | SYSTOLIC BLOOD PRESSURE: 178 MMHG | BODY MASS INDEX: 40.58 KG/M2

## 2022-08-25 DIAGNOSIS — E11.49 TYPE II DIABETES MELLITUS WITH NEUROLOGICAL MANIFESTATIONS: Primary | ICD-10-CM

## 2022-08-25 DIAGNOSIS — L97.424 HEEL ULCER, LEFT, WITH NECROSIS OF BONE: ICD-10-CM

## 2022-08-25 DIAGNOSIS — I73.9 PVD (PERIPHERAL VASCULAR DISEASE): ICD-10-CM

## 2022-08-25 DIAGNOSIS — M79.89 LEG SWELLING: ICD-10-CM

## 2022-08-25 PROCEDURE — 1159F PR MEDICATION LIST DOCUMENTED IN MEDICAL RECORD: ICD-10-PCS | Mod: CPTII,S$GLB,, | Performed by: PODIATRIST

## 2022-08-25 PROCEDURE — 3078F DIAST BP <80 MM HG: CPT | Mod: CPTII,S$GLB,, | Performed by: PODIATRIST

## 2022-08-25 PROCEDURE — 1126F AMNT PAIN NOTED NONE PRSNT: CPT | Mod: CPTII,S$GLB,, | Performed by: PODIATRIST

## 2022-08-25 PROCEDURE — 99999 PR PBB SHADOW E&M-EST. PATIENT-LVL III: ICD-10-PCS | Mod: PBBFAC,,, | Performed by: PODIATRIST

## 2022-08-25 PROCEDURE — 3046F HEMOGLOBIN A1C LEVEL >9.0%: CPT | Mod: CPTII,S$GLB,, | Performed by: PODIATRIST

## 2022-08-25 PROCEDURE — 99999 PR PBB SHADOW E&M-EST. PATIENT-LVL III: CPT | Mod: PBBFAC,,, | Performed by: PODIATRIST

## 2022-08-25 PROCEDURE — 4010F ACE/ARB THERAPY RXD/TAKEN: CPT | Mod: CPTII,S$GLB,, | Performed by: PODIATRIST

## 2022-08-25 PROCEDURE — 3077F SYST BP >= 140 MM HG: CPT | Mod: CPTII,S$GLB,, | Performed by: PODIATRIST

## 2022-08-25 PROCEDURE — 99499 UNLISTED E&M SERVICE: CPT | Mod: S$GLB,,, | Performed by: PODIATRIST

## 2022-08-25 PROCEDURE — 3077F PR MOST RECENT SYSTOLIC BLOOD PRESSURE >= 140 MM HG: ICD-10-PCS | Mod: CPTII,S$GLB,, | Performed by: PODIATRIST

## 2022-08-25 PROCEDURE — 3046F PR MOST RECENT HEMOGLOBIN A1C LEVEL > 9.0%: ICD-10-PCS | Mod: CPTII,S$GLB,, | Performed by: PODIATRIST

## 2022-08-25 PROCEDURE — 1126F PR PAIN SEVERITY QUANTIFIED, NO PAIN PRESENT: ICD-10-PCS | Mod: CPTII,S$GLB,, | Performed by: PODIATRIST

## 2022-08-25 PROCEDURE — 99499 NO LOS: ICD-10-PCS | Mod: S$GLB,,, | Performed by: PODIATRIST

## 2022-08-25 PROCEDURE — 1159F MED LIST DOCD IN RCRD: CPT | Mod: CPTII,S$GLB,, | Performed by: PODIATRIST

## 2022-08-25 PROCEDURE — 11044 PR DEBRIDEMENT, SKIN, SUB-Q TISSUE,MUSCLE,BONE,=<20 SQ CM: ICD-10-PCS | Mod: S$GLB,,, | Performed by: PODIATRIST

## 2022-08-25 PROCEDURE — 3008F PR BODY MASS INDEX (BMI) DOCUMENTED: ICD-10-PCS | Mod: CPTII,S$GLB,, | Performed by: PODIATRIST

## 2022-08-25 PROCEDURE — 3078F PR MOST RECENT DIASTOLIC BLOOD PRESSURE < 80 MM HG: ICD-10-PCS | Mod: CPTII,S$GLB,, | Performed by: PODIATRIST

## 2022-08-25 PROCEDURE — 4010F PR ACE/ARB THEARPY RXD/TAKEN: ICD-10-PCS | Mod: CPTII,S$GLB,, | Performed by: PODIATRIST

## 2022-08-25 PROCEDURE — 3008F BODY MASS INDEX DOCD: CPT | Mod: CPTII,S$GLB,, | Performed by: PODIATRIST

## 2022-08-25 PROCEDURE — 11044 DBRDMT BONE 1ST 20 SQ CM/<: CPT | Mod: S$GLB,,, | Performed by: PODIATRIST

## 2022-09-17 ENCOUNTER — HOSPITAL ENCOUNTER (OUTPATIENT)
Facility: HOSPITAL | Age: 72
Discharge: HOME | End: 2022-09-20
Attending: EMERGENCY MEDICINE | Admitting: STUDENT IN AN ORGANIZED HEALTH CARE EDUCATION/TRAINING PROGRAM
Payer: MEDICARE

## 2022-09-17 DIAGNOSIS — I21.4 NSTEMI (NON-ST ELEVATED MYOCARDIAL INFARCTION): ICD-10-CM

## 2022-09-17 DIAGNOSIS — I20.89 STABLE ANGINA PECTORIS: ICD-10-CM

## 2022-09-17 DIAGNOSIS — R07.9 CHEST PAIN: Primary | ICD-10-CM

## 2022-09-17 DIAGNOSIS — I44.1 MOBITZ TYPE 1 SECOND DEGREE AV BLOCK: ICD-10-CM

## 2022-09-17 DIAGNOSIS — E87.6 HYPOKALEMIA: ICD-10-CM

## 2022-09-17 DIAGNOSIS — R07.89 CHEST DISCOMFORT: ICD-10-CM

## 2022-09-17 LAB
ALBUMIN SERPL BCP-MCNC: 2.8 G/DL (ref 3.5–5.2)
ALP SERPL-CCNC: 79 U/L (ref 55–135)
ALT SERPL W/O P-5'-P-CCNC: 6 U/L (ref 10–44)
ANION GAP SERPL CALC-SCNC: 11 MMOL/L (ref 8–16)
AST SERPL-CCNC: 12 U/L (ref 10–40)
BACTERIA #/AREA URNS AUTO: ABNORMAL /HPF
BASOPHILS # BLD AUTO: 0.01 K/UL (ref 0–0.2)
BASOPHILS NFR BLD: 0.3 % (ref 0–1.9)
BILIRUB SERPL-MCNC: 0.4 MG/DL (ref 0.1–1)
BILIRUB UR QL STRIP: NEGATIVE
BNP SERPL-MCNC: 63 PG/ML (ref 0–99)
BUN SERPL-MCNC: 15 MG/DL (ref 8–23)
CALCIUM SERPL-MCNC: 8.6 MG/DL (ref 8.7–10.5)
CHLORIDE SERPL-SCNC: 98 MMOL/L (ref 95–110)
CLARITY UR REFRACT.AUTO: CLEAR
CO2 SERPL-SCNC: 31 MMOL/L (ref 23–29)
COLOR UR AUTO: YELLOW
CREAT SERPL-MCNC: 1.4 MG/DL (ref 0.5–1.4)
D DIMER PPP IA.FEU-MCNC: 1.55 MG/L FEU
DIFFERENTIAL METHOD: ABNORMAL
EOSINOPHIL # BLD AUTO: 0.1 K/UL (ref 0–0.5)
EOSINOPHIL NFR BLD: 2 % (ref 0–8)
ERYTHROCYTE [DISTWIDTH] IN BLOOD BY AUTOMATED COUNT: 17.2 % (ref 11.5–14.5)
EST. GFR  (NO RACE VARIABLE): 53.1 ML/MIN/1.73 M^2
ESTIMATED AVG GLUCOSE: 237 MG/DL (ref 68–131)
GLUCOSE SERPL-MCNC: 127 MG/DL (ref 70–110)
GLUCOSE UR QL STRIP: NEGATIVE
HBA1C MFR BLD: 9.9 % (ref 4–5.6)
HCT VFR BLD AUTO: 44.7 % (ref 40–54)
HCV AB SERPL QL IA: NORMAL
HGB BLD-MCNC: 13.8 G/DL (ref 14–18)
HGB UR QL STRIP: NEGATIVE
HIV 1+2 AB+HIV1 P24 AG SERPL QL IA: NORMAL
HYALINE CASTS UR QL AUTO: 0 /LPF
IMM GRANULOCYTES # BLD AUTO: 0.01 K/UL (ref 0–0.04)
IMM GRANULOCYTES NFR BLD AUTO: 0.3 % (ref 0–0.5)
KETONES UR QL STRIP: NEGATIVE
LEUKOCYTE ESTERASE UR QL STRIP: ABNORMAL
LIPASE SERPL-CCNC: 10 U/L (ref 4–60)
LYMPHOCYTES # BLD AUTO: 0.7 K/UL (ref 1–4.8)
LYMPHOCYTES NFR BLD: 18.4 % (ref 18–48)
MAGNESIUM SERPL-MCNC: 1.7 MG/DL (ref 1.6–2.6)
MCH RBC QN AUTO: 25.7 PG (ref 27–31)
MCHC RBC AUTO-ENTMCNC: 30.9 G/DL (ref 32–36)
MCV RBC AUTO: 83 FL (ref 82–98)
MICROSCOPIC COMMENT: ABNORMAL
MONOCYTES # BLD AUTO: 0.3 K/UL (ref 0.3–1)
MONOCYTES NFR BLD: 8.9 % (ref 4–15)
NEUTROPHILS # BLD AUTO: 2.5 K/UL (ref 1.8–7.7)
NEUTROPHILS NFR BLD: 70.1 % (ref 38–73)
NITRITE UR QL STRIP: NEGATIVE
NRBC BLD-RTO: 0 /100 WBC
PH UR STRIP: 7 [PH] (ref 5–8)
PHOSPHATE SERPL-MCNC: 3.5 MG/DL (ref 2.7–4.5)
PLATELET # BLD AUTO: 243 K/UL (ref 150–450)
PMV BLD AUTO: 9.9 FL (ref 9.2–12.9)
POTASSIUM SERPL-SCNC: 3 MMOL/L (ref 3.5–5.1)
PROT SERPL-MCNC: 7.4 G/DL (ref 6–8.4)
PROT UR QL STRIP: ABNORMAL
RBC # BLD AUTO: 5.38 M/UL (ref 4.6–6.2)
RBC #/AREA URNS AUTO: 2 /HPF (ref 0–4)
SODIUM SERPL-SCNC: 140 MMOL/L (ref 136–145)
SP GR UR STRIP: >1.03 (ref 1–1.03)
SQUAMOUS #/AREA URNS AUTO: 1 /HPF
TROPONIN I SERPL DL<=0.01 NG/ML-MCNC: 0.01 NG/ML (ref 0–0.03)
TROPONIN I SERPL DL<=0.01 NG/ML-MCNC: 0.02 NG/ML (ref 0–0.03)
TSH SERPL DL<=0.005 MIU/L-ACNC: 1.17 UIU/ML (ref 0.4–4)
URN SPEC COLLECT METH UR: ABNORMAL
WBC # BLD AUTO: 3.58 K/UL (ref 3.9–12.7)
WBC #/AREA URNS AUTO: 54 /HPF (ref 0–5)

## 2022-09-17 PROCEDURE — 99285 EMERGENCY DEPT VISIT HI MDM: CPT | Mod: 25

## 2022-09-17 PROCEDURE — 25000003 PHARM REV CODE 250: Performed by: NURSE PRACTITIONER

## 2022-09-17 PROCEDURE — 83690 ASSAY OF LIPASE: CPT | Performed by: PHYSICIAN ASSISTANT

## 2022-09-17 PROCEDURE — 80053 COMPREHEN METABOLIC PANEL: CPT | Performed by: PHYSICIAN ASSISTANT

## 2022-09-17 PROCEDURE — 83735 ASSAY OF MAGNESIUM: CPT | Performed by: PHYSICIAN ASSISTANT

## 2022-09-17 PROCEDURE — 25000003 PHARM REV CODE 250: Performed by: PHYSICIAN ASSISTANT

## 2022-09-17 PROCEDURE — G0378 HOSPITAL OBSERVATION PER HR: HCPCS

## 2022-09-17 PROCEDURE — 99220 PR INITIAL OBSERVATION CARE,LEVL III: ICD-10-PCS | Mod: ,,, | Performed by: NURSE PRACTITIONER

## 2022-09-17 PROCEDURE — 84484 ASSAY OF TROPONIN QUANT: CPT | Mod: 91 | Performed by: NURSE PRACTITIONER

## 2022-09-17 PROCEDURE — 99220 PR INITIAL OBSERVATION CARE,LEVL III: CPT | Mod: ,,, | Performed by: NURSE PRACTITIONER

## 2022-09-17 PROCEDURE — 84100 ASSAY OF PHOSPHORUS: CPT | Performed by: PHYSICIAN ASSISTANT

## 2022-09-17 PROCEDURE — 85379 FIBRIN DEGRADATION QUANT: CPT | Performed by: EMERGENCY MEDICINE

## 2022-09-17 PROCEDURE — 83880 ASSAY OF NATRIURETIC PEPTIDE: CPT | Performed by: PHYSICIAN ASSISTANT

## 2022-09-17 PROCEDURE — 93010 ELECTROCARDIOGRAM REPORT: CPT | Mod: ,,, | Performed by: INTERNAL MEDICINE

## 2022-09-17 PROCEDURE — 25500020 PHARM REV CODE 255: Performed by: EMERGENCY MEDICINE

## 2022-09-17 PROCEDURE — 96372 THER/PROPH/DIAG INJ SC/IM: CPT | Mod: 59 | Performed by: NURSE PRACTITIONER

## 2022-09-17 PROCEDURE — 93010 EKG 12-LEAD: ICD-10-PCS | Mod: ,,, | Performed by: INTERNAL MEDICINE

## 2022-09-17 PROCEDURE — 86803 HEPATITIS C AB TEST: CPT | Performed by: PHYSICIAN ASSISTANT

## 2022-09-17 PROCEDURE — 87389 HIV-1 AG W/HIV-1&-2 AB AG IA: CPT | Performed by: PHYSICIAN ASSISTANT

## 2022-09-17 PROCEDURE — 84443 ASSAY THYROID STIM HORMONE: CPT | Performed by: PHYSICIAN ASSISTANT

## 2022-09-17 PROCEDURE — 99285 PR EMERGENCY DEPT VISIT,LEVEL V: ICD-10-PCS | Mod: ,,, | Performed by: PHYSICIAN ASSISTANT

## 2022-09-17 PROCEDURE — 83036 HEMOGLOBIN GLYCOSYLATED A1C: CPT | Performed by: PHYSICIAN ASSISTANT

## 2022-09-17 PROCEDURE — 81001 URINALYSIS AUTO W/SCOPE: CPT | Performed by: NURSE PRACTITIONER

## 2022-09-17 PROCEDURE — 93005 ELECTROCARDIOGRAM TRACING: CPT

## 2022-09-17 PROCEDURE — 87086 URINE CULTURE/COLONY COUNT: CPT | Performed by: NURSE PRACTITIONER

## 2022-09-17 PROCEDURE — 84484 ASSAY OF TROPONIN QUANT: CPT | Mod: 91 | Performed by: PHYSICIAN ASSISTANT

## 2022-09-17 PROCEDURE — 85025 COMPLETE CBC W/AUTO DIFF WBC: CPT | Performed by: PHYSICIAN ASSISTANT

## 2022-09-17 PROCEDURE — 99285 EMERGENCY DEPT VISIT HI MDM: CPT | Mod: ,,, | Performed by: PHYSICIAN ASSISTANT

## 2022-09-17 RX ORDER — GABAPENTIN 100 MG/1
100 CAPSULE ORAL DAILY
Status: DISCONTINUED | OUTPATIENT
Start: 2022-09-18 | End: 2022-09-20 | Stop reason: HOSPADM

## 2022-09-17 RX ORDER — IBUPROFEN 200 MG
16 TABLET ORAL
Status: DISCONTINUED | OUTPATIENT
Start: 2022-09-17 | End: 2022-09-20 | Stop reason: HOSPADM

## 2022-09-17 RX ORDER — MAGNESIUM SULFATE HEPTAHYDRATE 40 MG/ML
2 INJECTION, SOLUTION INTRAVENOUS ONCE
Status: COMPLETED | OUTPATIENT
Start: 2022-09-17 | End: 2022-09-18

## 2022-09-17 RX ORDER — MAG HYDROX/ALUMINUM HYD/SIMETH 200-200-20
30 SUSPENSION, ORAL (FINAL DOSE FORM) ORAL ONCE
Status: COMPLETED | OUTPATIENT
Start: 2022-09-17 | End: 2022-09-17

## 2022-09-17 RX ORDER — ATORVASTATIN CALCIUM 20 MG/1
80 TABLET, FILM COATED ORAL DAILY
Status: DISCONTINUED | OUTPATIENT
Start: 2022-09-18 | End: 2022-09-20 | Stop reason: HOSPADM

## 2022-09-17 RX ORDER — INSULIN ASPART 100 [IU]/ML
0-5 INJECTION, SOLUTION INTRAVENOUS; SUBCUTANEOUS
Status: DISCONTINUED | OUTPATIENT
Start: 2022-09-17 | End: 2022-09-20 | Stop reason: HOSPADM

## 2022-09-17 RX ORDER — ASCORBIC ACID 500 MG
500 TABLET ORAL DAILY
Status: DISCONTINUED | OUTPATIENT
Start: 2022-09-18 | End: 2022-09-20 | Stop reason: HOSPADM

## 2022-09-17 RX ORDER — IBUPROFEN 200 MG
24 TABLET ORAL
Status: DISCONTINUED | OUTPATIENT
Start: 2022-09-17 | End: 2022-09-20 | Stop reason: HOSPADM

## 2022-09-17 RX ORDER — ACETAMINOPHEN 325 MG/1
650 TABLET ORAL EVERY 6 HOURS PRN
Status: DISCONTINUED | OUTPATIENT
Start: 2022-09-17 | End: 2022-09-20 | Stop reason: HOSPADM

## 2022-09-17 RX ORDER — POTASSIUM CHLORIDE 20 MEQ/1
40 TABLET, EXTENDED RELEASE ORAL
Status: COMPLETED | OUTPATIENT
Start: 2022-09-17 | End: 2022-09-17

## 2022-09-17 RX ORDER — NIFEDIPINE 30 MG/1
90 TABLET, EXTENDED RELEASE ORAL DAILY
Status: DISCONTINUED | OUTPATIENT
Start: 2022-09-18 | End: 2022-09-18

## 2022-09-17 RX ORDER — ISOSORBIDE DINITRATE 10 MG/1
10 TABLET ORAL 3 TIMES DAILY
Status: DISCONTINUED | OUTPATIENT
Start: 2022-09-18 | End: 2022-09-17

## 2022-09-17 RX ORDER — TAMSULOSIN HYDROCHLORIDE 0.4 MG/1
0.4 CAPSULE ORAL DAILY
Status: DISCONTINUED | OUTPATIENT
Start: 2022-09-18 | End: 2022-09-20 | Stop reason: HOSPADM

## 2022-09-17 RX ORDER — LIDOCAINE HYDROCHLORIDE 20 MG/ML
15 SOLUTION OROPHARYNGEAL ONCE
Status: COMPLETED | OUTPATIENT
Start: 2022-09-17 | End: 2022-09-17

## 2022-09-17 RX ORDER — INSULIN ASPART 100 [IU]/ML
12 INJECTION, SOLUTION INTRAVENOUS; SUBCUTANEOUS
Status: DISCONTINUED | OUTPATIENT
Start: 2022-09-18 | End: 2022-09-20 | Stop reason: HOSPADM

## 2022-09-17 RX ORDER — ISOSORBIDE DINITRATE 10 MG/1
10 TABLET ORAL 3 TIMES DAILY
Status: DISCONTINUED | OUTPATIENT
Start: 2022-09-17 | End: 2022-09-19

## 2022-09-17 RX ORDER — ASPIRIN 325 MG
325 TABLET ORAL
Status: COMPLETED | OUTPATIENT
Start: 2022-09-17 | End: 2022-09-17

## 2022-09-17 RX ORDER — ACETAMINOPHEN 500 MG
1000 TABLET ORAL ONCE
Status: COMPLETED | OUTPATIENT
Start: 2022-09-17 | End: 2022-09-17

## 2022-09-17 RX ORDER — GLUCAGON 1 MG
1 KIT INJECTION
Status: DISCONTINUED | OUTPATIENT
Start: 2022-09-17 | End: 2022-09-20 | Stop reason: HOSPADM

## 2022-09-17 RX ORDER — ASPIRIN 81 MG/1
81 TABLET ORAL DAILY
Status: DISCONTINUED | OUTPATIENT
Start: 2022-09-18 | End: 2022-09-20 | Stop reason: HOSPADM

## 2022-09-17 RX ORDER — HYDRALAZINE HYDROCHLORIDE 25 MG/1
25 TABLET, FILM COATED ORAL 3 TIMES DAILY
Status: DISCONTINUED | OUTPATIENT
Start: 2022-09-18 | End: 2022-09-19

## 2022-09-17 RX ADMIN — LIDOCAINE HYDROCHLORIDE 15 ML: 20 SOLUTION OROPHARYNGEAL at 11:09

## 2022-09-17 RX ADMIN — ISOSORBIDE DINITRATE 10 MG: 10 TABLET ORAL at 11:09

## 2022-09-17 RX ADMIN — INSULIN DETEMIR 40 UNITS: 100 INJECTION, SOLUTION SUBCUTANEOUS at 11:09

## 2022-09-17 RX ADMIN — Medication 1 CAPSULE: at 11:09

## 2022-09-17 RX ADMIN — POTASSIUM CHLORIDE 40 MEQ: 1500 TABLET, EXTENDED RELEASE ORAL at 05:09

## 2022-09-17 RX ADMIN — ASPIRIN 325 MG ORAL TABLET 325 MG: 325 PILL ORAL at 05:09

## 2022-09-17 RX ADMIN — ALUMINUM HYDROXIDE, MAGNESIUM HYDROXIDE, AND SIMETHICONE 30 ML: 200; 200; 20 SUSPENSION ORAL at 11:09

## 2022-09-17 RX ADMIN — ACETAMINOPHEN 1000 MG: 500 TABLET ORAL at 11:09

## 2022-09-17 RX ADMIN — IOHEXOL 75 ML: 350 INJECTION, SOLUTION INTRAVENOUS at 08:09

## 2022-09-17 NOTE — Clinical Note
Diagnosis: Chest pain [197860]   Future Attending Provider: JESUS SINGH [[id number]]   Admitting Provider:: JESUS SINGH [[id number]]

## 2022-09-17 NOTE — ED TRIAGE NOTES
"Pt. Is a 72 y.o. male presenting to the ED via personal transport from home. Per the pt., he began having midsternal chest pain this morning while at his home. States the pain was "stabbing," denies radiation.   "

## 2022-09-17 NOTE — ED PROVIDER NOTES
"Encounter Date: 9/17/2022       History     Chief Complaint   Patient presents with    Chest Pain     The history is provided by the patient and medical records. No  was used.     Saji Castañeda is a 72 y.o. male with medical history of T2DM, Peripheral Vascular Disease, Asymptomatic Bradycardia, HLD, CKD, Chronic L foot/ankle osteomyelitis, Hx of DVT presenting to the ED with the chief complaint of chest pain.     Patient developed localized "stabbing" left lower sternal chest pain today around 9am while sitting in his wheelchair watching TV. Pain has been waxing and waning. Denies associated fever, cough, SOB, abdominal pain, nausea, vomiting, urinary or bowel movement changes. Denies history of cardiac disease. Currently undergoing wound care treatments for BLE wounds. Last dressings were changed 2 days ago. No recent medication changes.     Review of patient's allergies indicates:  No Known Allergies  Past Medical History:   Diagnosis Date    Arthritis     legs    Diabetes mellitus     Diabetes mellitus, type 2     Hyperlipidemia     Osteomyelitis      Past Surgical History:   Procedure Laterality Date    ANGIOGRAPHY OF LOWER EXTREMITY N/A 2/3/2021    Procedure: Angiogram Extremity Bilateral;  Surgeon: Ernst Chacko MD;  Location: [facility] OR [address] [place] [address];  Service: Peripheral Vascular;  Laterality: N/A;  7.4 mintues fluoroscopy time  816.15 mGy  170.17 Gycm2    AORTOGRAPHY WITH EXTREMITY RUNOFF Bilateral 2/3/2021    Procedure: AORTOGRAM, WITH EXTREMITY RUNOFF;  Surgeon: Ernst Chacko MD;  Location: [facility] OR [address] [place] [address];  Service: Peripheral Vascular;  Laterality: Bilateral;    DEBRIDEMENT OF FOOT Left 2/23/2021    Procedure: DEBRIDEMENT, LEFT HEEL;  Surgeon: Mayra Schroeder DPM;  Location: [facility] OR [address] [place] [address];  Service: Podiatry;  Laterality: Left;    FOOT AMPUTATION  October 2010    left high midfoot amputation     Family History   Problem Relation Age of Onset    Diabetes Mother     " Heart disease Mother     Stroke Sister     Cancer Brother     Diabetes Sister      Social History     Tobacco Use    Smoking status: Never    Smokeless tobacco: Never   Substance Use Topics    Alcohol use: No     Comment: occassional    Drug use: No     Review of Systems   Constitutional:  Negative for fever.   HENT:  Negative for sore throat.    Eyes:  Negative for pain.   Respiratory:  Negative for shortness of breath.    Cardiovascular:  Positive for chest pain.   Gastrointestinal:  Negative for nausea.   Genitourinary:  Negative for dysuria.   Musculoskeletal:  Negative for back pain.   Skin:  Positive for wound. Negative for rash.   Neurological:  Negative for weakness.   Hematological:  Does not bruise/bleed easily.     Physical Exam     Initial Vitals [09/17/22 1520]   BP Pulse Resp Temp SpO2   (!) 145/64 71 20 98.8 °F (37.1 °C) 96 %      MAP       --         Physical Exam    Constitutional: He appears well-developed and well-nourished. He is not diaphoretic. He is easily aroused.   Obese male   HENT:   Head: Normocephalic and atraumatic.   Mouth/Throat: Oropharynx is clear and moist. No oropharyngeal exudate.   Eyes: EOM and lids are normal. Pupils are equal, round, and reactive to light. No scleral icterus.   Neck: Phonation normal. Neck supple. No stridor present.   Normal range of motion.  Cardiovascular:  Normal rate. An irregularly irregular rhythm present.           Pulmonary/Chest: Breath sounds normal. No stridor. No respiratory distress. He has no wheezes. He has no rales.   Abdominal: Abdomen is soft. He exhibits no distension. There is no abdominal tenderness. There is no rebound.   Musculoskeletal:         General: No tenderness or edema. Normal range of motion.      Cervical back: Normal range of motion and neck supple.      Comments: BLE wound dressings in place. L foot amputation     Neurological: He is alert, oriented to person, place, and time and easily aroused. He has normal strength. No  sensory deficit.   Skin: Skin is warm and dry. No rash noted. No erythema.   Psychiatric: He has a normal mood and affect. His speech is normal.       ED Course   Procedures  Labs Reviewed   CBC W/ AUTO DIFFERENTIAL - Abnormal; Notable for the following components:       Result Value    WBC 3.58 (*)     Hemoglobin 13.8 (*)     MCH 25.7 (*)     MCHC 30.9 (*)     RDW 17.2 (*)     Lymph # 0.7 (*)     All other components within normal limits   COMPREHENSIVE METABOLIC PANEL - Abnormal; Notable for the following components:    Potassium 3.0 (*)     CO2 31 (*)     Glucose 127 (*)     Calcium 8.6 (*)     Albumin 2.8 (*)     ALT 6 (*)     eGFR 53.1 (*)     All other components within normal limits   D DIMER, QUANTITATIVE - Abnormal; Notable for the following components:    D-Dimer 1.55 (*)     All other components within normal limits   URINALYSIS, REFLEX TO URINE CULTURE - Abnormal; Notable for the following components:    Specific Gravity, UA >1.030 (*)     Protein, UA 1+ (*)     Leukocytes, UA 3+ (*)     All other components within normal limits    Narrative:     Specimen Source->Urine   URINALYSIS MICROSCOPIC - Abnormal; Notable for the following components:    WBC, UA 54 (*)     All other components within normal limits    Narrative:     Specimen Source->Urine   HEMOGLOBIN A1C - Abnormal; Notable for the following components:    Hemoglobin A1C 9.9 (*)     Estimated Avg Glucose 237 (*)     All other components within normal limits    Narrative:     ADD ON A1C PER DR GHULAM LINDQUIST/ORDER# [id number] @ 23:03   CULTURE, URINE   HIV 1 / 2 ANTIBODY    Narrative:     Release to patient->Immediate   HEPATITIS C ANTIBODY    Narrative:     Release to patient->Immediate   TSH   MAGNESIUM   PHOSPHORUS   B-TYPE NATRIURETIC PEPTIDE   TROPONIN I   LIPASE   TROPONIN I    Narrative:     2nd trop at 9pm   HEMOGLOBIN A1C   POCT GLUCOSE MONITORING CONTINUOUS        ECG Results              EKG 12-lead (Final result)  Result time 09/18/22  12:17:10      Final result by Interface, Lab In [facility] (09/18/22 12:17:10)                   Narrative:    Test Reason :     Vent. Rate : 054 BPM     Atrial Rate : 079 BPM     P-R Int : 000 ms          QRS Dur : 096 ms      QT Int : 332 ms       P-R-T Axes : 054 046 -56 degrees     QTc Int : 314 ms    Sinus rhythm Second degree AV block(type I - Wenckebach) with occasional   Premature ventricular complexes  Nonspecific T wave abnormality  Abnormal ECG  When compared with ECG of 17-SEP-2022 15:24,  Premature ventricular complexes are now Present  Nonspecific T wave abnormality, worse in Inferior leads  T wave inversion now evident in Anterior leads  QT has shortened  Confirmed by DON BULL MD ([id number]) on 9/18/2022 12:16:59 PM    Referred By: [email or web address]   SELF           Confirmed By:DON BULL MD                                     EKG 12-lead (Final result)  Result time 09/18/22 12:04:04      Final result by Interface, Lab In [facility] (09/18/22 12:04:04)                   Narrative:    Test Reason : R07.89,    Vent. Rate : 060 BPM     Atrial Rate : 080 BPM     P-R Int : 000 ms          QRS Dur : 096 ms      QT Int : 432 ms       P-R-T Axes : 059 008 040 degrees     QTc Int : 432 ms    Sinus rhythm with 2nd degree A-V block (Mobitz I)  Abnormal ECG  When compared with ECG of 08-JUL-2022 10:40,  Sinus rhythm is now with 2nd degree A-V block (Mobitz I)  No significant change was found  Confirmed by DON BULL MD ([id number]) on 9/18/2022 12:04:00 PM    Referred By: [email or web address]   SELF           Confirmed By:DNO BULL MD                                  Imaging Results              CTA Chest Non-Coronary (PE Studies) (Final result)  Result time 09/17/22 21:52:49      Final result by Max Pena MD (09/17/22 21:52:49)                   Impression:      Artifact limited study with suboptimal opacification of the pulmonary arteries, however there is no evidence of large pulmonary thromboembolism.  Further  evaluation/follow-up as clinically warranted.    Minimal patchy ground-glass opacities mainly in the left lower lobe could represent early infectious/inflammatory change.    Dilatation of pulmonary trunk, which can represent pulmonary artery hypertension.    Mediastinal lymphadenopathy, stable from prior.    Additional findings above.    Electronically signed by resident: Fatmata Rm  Date:    09/17/2022  Time:    21:01    Electronically signed by: Max Pena MD  Date:    09/17/2022  Time:    21:52               Narrative:    EXAMINATION:  CTA CHEST NON CORONARY (PE STUDIES)    CLINICAL HISTORY:  Pulmonary embolism (PE) suspected, positive D-dimer;    TECHNIQUE:  Low dose axial images, sagittal and coronal reformations were obtained from the thoracic inlet to the lung bases following the IV administration of 75 mL of Omnipaque 350.  MIP images were performed.    COMPARISON:  Chest radiograph 09/17/2022, CTA chest 10/15/2018    FINDINGS:  Evaluation is limited by extensive streak artifact due to the patient's arms overlying the field of view.  Exam quality also mildly limited by motion.    Base of Neck: No significant abnormality.    Thoracic soft tissues: No axillary lymphadenopathy.  Bilateral gynecomastia.    Aorta/vasculature: 3 vessel left-sided aortic arch.  No aneurysm.  No significant calcific atherosclerosis.    Heart: Left atrial enlargement.  Trace calcification of the left-sided coronary arteries.  No pericardial effusion.    Pulmonary vasculature: This study is suboptimal for the evaluation of the pulmonary arteries. There is no large filling defect of the pulmonary arteries to suggest pulmonary thromboembolism.  Dilatation of pulmonary trunk to 3.5 cm.  The pulmonary veins are unremarkable.    Rochelle/Mediastinum: Mediastinal lymphadenopathy similar to prior, for example the right lower paratracheal node measuring up to 1.8 cm (series 2, image 176).    Airways: Patent.    Lungs/Pleura: Detailed  evaluation of the pulmonary parenchyma is limited by motion and beam hardening artifact.  Mild dependent atelectasis.  Subtle patchy ground-glass opacities mainly involving the left lower lobe, which could represent early infectious/inflammatory change.. No focal consolidation, pleural effusion, or pneumothorax.    Esophagus: Slightly patulous and air-filled.    Upper Abdomen: 1.5 cm hypodensity in the superior pole of the left kidney, favored to represent a cyst.  Layering hyperdensity within the lumen of the stomach, likely ingested material.  There is possible peripheral calcifications or vascularity of the gallbladder wall.  Nitrogen containing gallstones are present.  Detailed evaluation of the upper abdomen is markedly limited by severe beam hardening artifact related to positioning of the patient's arms.    Bones: Osteopenic appearing bones.  Diffuse idiopathic skeletal hyperostosis of the spine.  Osseous degenerative changes are noted.  No acute fracture. No suspicious lytic or sclerotic lesions.                                       X-Ray Chest AP Portable (Final result)  Result time 09/17/22 17:01:28      Final result by Milton Dove MD (09/17/22 17:01:28)                   Impression:      1. Pulmonary findings are likely the result of shallow inspiratory effort and habitus, no large focal consolidation.      Electronically signed by: Milton Dove MD  Date:    09/17/2022  Time:    17:01               Narrative:    EXAMINATION:  XR CHEST AP PORTABLE    CLINICAL HISTORY:  Chest pain, unspecified    TECHNIQUE:  Single frontal view of the chest was performed.    COMPARISON:  06/24/2022    FINDINGS:  The cardiomediastinal silhouette is prominent, magnified by technique, similar to the previous examination.  There is elevation of the right hemidiaphragm..  There is no pleural effusion.  The trachea is midline.  The lungs are symmetrically expanded bilaterally with mildly coarse central hilar  interstitial attenuation accentuated by shallow inspiratory effort.  There is bilateral basilar subsegmental atelectasis..  No large focal consolidation seen.  There is no pneumothorax.  The osseous structures are remarkable for degenerative changes..                                       Medications   acetaminophen tablet 650 mg (has no administration in time range)   ascorbic acid (vitamin C) tablet 500 mg (500 mg Oral Given 9/18/22 0846)   aspirin EC tablet 81 mg (81 mg Oral Given 9/18/22 0846)   gabapentin capsule 100 mg (100 mg Oral Given 9/18/22 0846)   hydrALAZINE tablet 25 mg (25 mg Oral Given 9/18/22 1404)   tamsulosin 24 hr capsule 0.4 mg (0.4 mg Oral Given 9/18/22 0846)   atorvastatin tablet 80 mg (80 mg Oral Given 9/18/22 0845)   Lactobacillus rhamnosus GG capsule 1 capsule (1 capsule Oral Given 9/18/22 0846)   glucose chewable tablet 16 g (has no administration in time range)   glucose chewable tablet 24 g (has no administration in time range)   glucagon (human recombinant) injection 1 mg (has no administration in time range)   dextrose 10% bolus 125 mL (has no administration in time range)   dextrose 10% bolus 250 mL (has no administration in time range)   insulin aspart U-100 pen 12 Units (12 Units Subcutaneous Not Given 9/18/22 1130)   insulin aspart U-100 pen 0-5 Units (has no administration in time range)   insulin detemir U-100 pen 40 Units (40 Units Subcutaneous Given 9/17/22 6169)   isosorbide dinitrate tablet 10 mg (10 mg Oral Given 9/18/22 1404)   sodium chloride 0.9% flush 10 mL (has no administration in time range)   melatonin tablet 6 mg (has no administration in time range)   prochlorperazine injection Soln 5 mg (has no administration in time range)   apixaban tablet 5 mg (5 mg Oral Given 9/18/22 0846)   losartan tablet 100 mg (100 mg Oral Given 9/18/22 0846)   cefTRIAXone (ROCEPHIN) 1 g/50 mL D5W IVPB (0 g Intravenous Stopped 9/18/22 9135)   potassium chloride 10 mEq in 100 mL IVPB (10  mEq Intravenous Not Given 9/18/22 1115)   clopidogreL tablet 75 mg (has no administration in time range)   heparin 25,000 units in dextrose 5% 250 mL (100 units/mL) infusion LOW INTENSITY nomogram Anti- Xa (12 Units/kg/hr × 92.1 kg (Adjusted) Intravenous New Bag 9/18/22 1543)   heparin 25,000 units in dextrose 5% (100 units/ml) IV bolus from bag - ADDITIONAL PRN BOLUS - 60 units/kg (max bolus 4000 units) (has no administration in time range)   heparin 25,000 units in dextrose 5% (100 units/ml) IV bolus from bag - ADDITIONAL PRN BOLUS - 30 units/kg (max bolus 4000 units) (has no administration in time range)   potassium chloride SA CR tablet 40 mEq (40 mEq Oral Given 9/17/22 1737)   aspirin tablet 325 mg (325 mg Oral Given 9/17/22 1737)   iohexoL (OMNIPAQUE 350) injection 75 mL (75 mLs Intravenous Given 9/17/22 2059)   magnesium sulfate 2g in water 50mL IVPB (premix) (0 g Intravenous Stopped 9/18/22 0255)   aluminum-magnesium hydroxide-simethicone 200-200-20 mg/5 mL suspension 30 mL (30 mLs Oral Given 9/17/22 2349)     And   LIDOcaine HCl 2% oral solution 15 mL (15 mLs Oral Given 9/17/22 2349)   acetaminophen tablet 1,000 mg (1,000 mg Oral Given 9/17/22 2348)   potassium bicarbonate disintegrating tablet 50 mEq (50 mEq Oral Given 9/18/22 0426)   potassium chloride SA CR tablet 40 mEq (40 mEq Oral Given 9/18/22 1016)   potassium chloride SA CR tablet 40 mEq (40 mEq Oral Given 9/18/22 1404)   clopidogreL tablet 300 mg (300 mg Oral Given 9/18/22 1535)   heparin 25,000 units in dextrose 5% (100 units/ml) IV bolus from bag INITIAL BOLUS (max bolus 4000 units) (4,000 Units Intravenous Bolus from Bag 9/18/22 1549)     Medical Decision Making:   History:   Old Medical Records: I decided to obtain old medical records.  Old Records Summarized: records from clinic visits and records from previous admission(s).  Independently Interpreted Test(s):   I have ordered and independently interpreted EKG Reading(s) - see summary below        <> Summary of EKG Reading(s): Sinus rhythm with 2nd degree AVB Type 1 with PVCs  Clinical Tests:   Lab Tests: Ordered and Reviewed  Radiological Study: Ordered and Reviewed  Medical Tests: Ordered and Reviewed  Other:   I have discussed this case with another health care provider.       <> Summary of the Discussion: Hospital Medicine  Additional MDM:     Well's Criteria Score:  -Clinical symptoms of DVT (leg swelling, pain with palpation) = 0.0  -Other diagnosis less likely than pulmonary embolism =            0.0  -Heart Rate >100 =   0.0  -Immobilization (= or > than 3 days) or surgery in the previous 4 weeks = 1.5  -Previous DVT/PE = 1.5  -Hemoptysis =          0.0  -Malignancy =           0.0  Well's Probability Score =    3      APC / Resident Notes:   72 y.o. male with medical history of T2DM, Peripheral Vascular Disease, Asymptomatic Bradycardia, HLD, CKD, Chronic L foot/ankle osteomyelitis, Hx of DVT presenting to the ED c/o left lower chest pain. DDx includes but not limited to ACS, CHF, cardiac arrhythmia, PNA, viral syndrome, pulmonary embolism, chest wall strain, myocarditis, pericarditis.        ED Course as of 09/18/22 1754   Sat Sep 17, 2022   2108 Troponin I: 0.010 [BA]   2108 WBC(!): 3.58 [BA]   2108 Hemoglobin(!): 13.8 [BA]   2108 Creatinine: 1.4 [BA]   2108 BNP: 63 [BA]   2108 CXR with no significant pulmonary edema or consolidations [BA]   2109 D-Dimer(!): 1.55  D-dimer obtained given DVT history and decreased mobility. CTA PE study subsequently obtained. In process  [BA]   2135 CTA without evidence of large PE. Discussed with , placed in observation for further evaluation of chest pain. Patient expresses understanding and agreeable to the plan. I have discussed the care of this patient with my supervising physician.  [BA]      ED Course User Index  [BA] Juan Anders PA-C                 Clinical Impression:   Final diagnoses:  [R07.89] Chest discomfort  [I44.1] Mobitz type 1 second  degree AV block  [R07.9] Chest pain (Primary)  [E87.6] Hypokalemia        ED Disposition Condition    Observation Stable                Juan Anders PA-C  09/18/22 6331

## 2022-09-17 NOTE — PROVIDER PROGRESS NOTES - EMERGENCY DEPT.
Encounter Date: 9/17/2022    ED Physician Progress Notes         EKG - STEMI Decision  Initial Reading: No STEMI present.  Second-degree type 1 av Wenckebach block.

## 2022-09-18 LAB
ALBUMIN SERPL BCP-MCNC: 2.6 G/DL (ref 3.5–5.2)
ALP SERPL-CCNC: 75 U/L (ref 55–135)
ALT SERPL W/O P-5'-P-CCNC: 7 U/L (ref 10–44)
ANION GAP SERPL CALC-SCNC: 11 MMOL/L (ref 8–16)
APTT BLDCRRT: 36.1 SEC (ref 21–32)
ASCENDING AORTA: 3.59 CM
AST SERPL-CCNC: 12 U/L (ref 10–40)
AV INDEX (PROSTH): 0.71
AV MEAN GRADIENT: 5 MMHG
AV PEAK GRADIENT: 10 MMHG
AV VALVE AREA: 2.93 CM2
AV VELOCITY RATIO: 0.64
BASOPHILS # BLD AUTO: 0.02 K/UL (ref 0–0.2)
BASOPHILS # BLD AUTO: 0.03 K/UL (ref 0–0.2)
BASOPHILS # BLD AUTO: 0.03 K/UL (ref 0–0.2)
BASOPHILS NFR BLD: 0.3 % (ref 0–1.9)
BASOPHILS NFR BLD: 0.4 % (ref 0–1.9)
BASOPHILS NFR BLD: 0.5 % (ref 0–1.9)
BILIRUB SERPL-MCNC: 0.3 MG/DL (ref 0.1–1)
BSA FOR ECHO PROCEDURE: 2.46 M2
BUN SERPL-MCNC: 14 MG/DL (ref 8–23)
CALCIUM SERPL-MCNC: 8.9 MG/DL (ref 8.7–10.5)
CHLORIDE SERPL-SCNC: 98 MMOL/L (ref 95–110)
CO2 SERPL-SCNC: 30 MMOL/L (ref 23–29)
CREAT SERPL-MCNC: 1.1 MG/DL (ref 0.5–1.4)
CRP SERPL-MCNC: 17.9 MG/L (ref 0–8.2)
CV ECHO LV RWT: 0.51 CM
DIFFERENTIAL METHOD: ABNORMAL
DOP CALC AO PEAK VEL: 1.56 M/S
DOP CALC AO VTI: 36.72 CM
DOP CALC LVOT AREA: 4.1 CM2
DOP CALC LVOT DIAMETER: 2.29 CM
DOP CALC LVOT PEAK VEL: 1 M/S
DOP CALC LVOT STROKE VOLUME: 107.73 CM3
DOP CALCLVOT PEAK VEL VTI: 26.17 CM
E WAVE DECELERATION TIME: 188.89 MSEC
E/A RATIO: 1.01
E/E' RATIO: 6.13 M/S
ECHO LV POSTERIOR WALL: 1.07 CM (ref 0.6–1.1)
EJECTION FRACTION: 60 %
EOSINOPHIL # BLD AUTO: 0.2 K/UL (ref 0–0.5)
EOSINOPHIL # BLD AUTO: 0.3 K/UL (ref 0–0.5)
EOSINOPHIL # BLD AUTO: 0.3 K/UL (ref 0–0.5)
EOSINOPHIL NFR BLD: 3 % (ref 0–8)
EOSINOPHIL NFR BLD: 4.4 % (ref 0–8)
EOSINOPHIL NFR BLD: 4.7 % (ref 0–8)
ERYTHROCYTE [DISTWIDTH] IN BLOOD BY AUTOMATED COUNT: 17.1 % (ref 11.5–14.5)
ERYTHROCYTE [DISTWIDTH] IN BLOOD BY AUTOMATED COUNT: 17.2 % (ref 11.5–14.5)
ERYTHROCYTE [DISTWIDTH] IN BLOOD BY AUTOMATED COUNT: 17.3 % (ref 11.5–14.5)
ERYTHROCYTE [SEDIMENTATION RATE] IN BLOOD BY PHOTOMETRIC METHOD: >120 MM/HR (ref 0–23)
EST. GFR  (NO RACE VARIABLE): >60 ML/MIN/1.73 M^2
FACT X PPP CHRO-ACNC: 0.52 IU/ML (ref 0.3–0.7)
FRACTIONAL SHORTENING: 32 % (ref 28–44)
GLUCOSE SERPL-MCNC: 74 MG/DL (ref 70–110)
HCT VFR BLD AUTO: 28.4 % (ref 40–54)
HCT VFR BLD AUTO: 30.2 % (ref 40–54)
HCT VFR BLD AUTO: 30.4 % (ref 40–54)
HGB BLD-MCNC: 9.3 G/DL (ref 14–18)
HGB BLD-MCNC: 9.6 G/DL (ref 14–18)
HGB BLD-MCNC: 9.6 G/DL (ref 14–18)
IMM GRANULOCYTES # BLD AUTO: 0.02 K/UL (ref 0–0.04)
IMM GRANULOCYTES NFR BLD AUTO: 0.3 % (ref 0–0.5)
IMM GRANULOCYTES NFR BLD AUTO: 0.3 % (ref 0–0.5)
IMM GRANULOCYTES NFR BLD AUTO: 0.4 % (ref 0–0.5)
INR PPP: 1.1 (ref 0.8–1.2)
INTERVENTRICULAR SEPTUM: 1.01 CM (ref 0.6–1.1)
IVRT: 94.2 MSEC
LA MAJOR: 6.35 CM
LA MINOR: 6.41 CM
LA WIDTH: 4.37 CM
LEFT ATRIUM SIZE: 3.64 CM
LEFT ATRIUM VOLUME INDEX MOD: 20.7 ML/M2
LEFT ATRIUM VOLUME INDEX: 36.6 ML/M2
LEFT ATRIUM VOLUME MOD: 48.95 CM3
LEFT ATRIUM VOLUME: 86.26 CM3
LEFT INTERNAL DIMENSION IN SYSTOLE: 2.83 CM (ref 2.1–4)
LEFT VENTRICLE DIASTOLIC VOLUME INDEX: 32.78 ML/M2
LEFT VENTRICLE DIASTOLIC VOLUME: 77.35 ML
LEFT VENTRICLE MASS INDEX: 61 G/M2
LEFT VENTRICLE SYSTOLIC VOLUME INDEX: 12.8 ML/M2
LEFT VENTRICLE SYSTOLIC VOLUME: 30.26 ML
LEFT VENTRICULAR INTERNAL DIMENSION IN DIASTOLE: 4.17 CM (ref 3.5–6)
LEFT VENTRICULAR MASS: 143.4 G
LV LATERAL E/E' RATIO: 6.53 M/S
LV SEPTAL E/E' RATIO: 5.76 M/S
LYMPHOCYTES # BLD AUTO: 1.1 K/UL (ref 1–4.8)
LYMPHOCYTES # BLD AUTO: 1.3 K/UL (ref 1–4.8)
LYMPHOCYTES # BLD AUTO: 1.5 K/UL (ref 1–4.8)
LYMPHOCYTES NFR BLD: 19.5 % (ref 18–48)
LYMPHOCYTES NFR BLD: 20.1 % (ref 18–48)
LYMPHOCYTES NFR BLD: 21.1 % (ref 18–48)
MAGNESIUM SERPL-MCNC: 1.8 MG/DL (ref 1.6–2.6)
MCH RBC QN AUTO: 25.9 PG (ref 27–31)
MCH RBC QN AUTO: 26.2 PG (ref 27–31)
MCH RBC QN AUTO: 26.8 PG (ref 27–31)
MCHC RBC AUTO-ENTMCNC: 31.6 G/DL (ref 32–36)
MCHC RBC AUTO-ENTMCNC: 31.8 G/DL (ref 32–36)
MCHC RBC AUTO-ENTMCNC: 32.7 G/DL (ref 32–36)
MCV RBC AUTO: 80 FL (ref 82–98)
MCV RBC AUTO: 82 FL (ref 82–98)
MCV RBC AUTO: 84 FL (ref 82–98)
MONOCYTES # BLD AUTO: 0.6 K/UL (ref 0.3–1)
MONOCYTES # BLD AUTO: 0.6 K/UL (ref 0.3–1)
MONOCYTES # BLD AUTO: 0.7 K/UL (ref 0.3–1)
MONOCYTES NFR BLD: 9.3 % (ref 4–15)
MONOCYTES NFR BLD: 9.8 % (ref 4–15)
MONOCYTES NFR BLD: 9.9 % (ref 4–15)
MV PEAK A VEL: 0.97 M/S
MV PEAK E VEL: 0.98 M/S
MV STENOSIS PRESSURE HALF TIME: 54.78 MS
MV VALVE AREA P 1/2 METHOD: 4.02 CM2
NEUTROPHILS # BLD AUTO: 3.7 K/UL (ref 1.8–7.7)
NEUTROPHILS # BLD AUTO: 3.8 K/UL (ref 1.8–7.7)
NEUTROPHILS # BLD AUTO: 5 K/UL (ref 1.8–7.7)
NEUTROPHILS NFR BLD: 63.7 % (ref 38–73)
NEUTROPHILS NFR BLD: 65.4 % (ref 38–73)
NEUTROPHILS NFR BLD: 66.9 % (ref 38–73)
NRBC BLD-RTO: 0 /100 WBC
PISA TR MAX VEL: 3.08 M/S
PLATELET # BLD AUTO: 291 K/UL (ref 150–450)
PLATELET # BLD AUTO: 316 K/UL (ref 150–450)
PLATELET # BLD AUTO: 332 K/UL (ref 150–450)
PMV BLD AUTO: 9.1 FL (ref 9.2–12.9)
PMV BLD AUTO: 9.2 FL (ref 9.2–12.9)
PMV BLD AUTO: 9.7 FL (ref 9.2–12.9)
POCT GLUCOSE: 144 MG/DL (ref 70–110)
POCT GLUCOSE: 206 MG/DL (ref 70–110)
POCT GLUCOSE: 65 MG/DL (ref 70–110)
POCT GLUCOSE: 74 MG/DL (ref 70–110)
POCT GLUCOSE: 84 MG/DL (ref 70–110)
POTASSIUM SERPL-SCNC: 3.1 MMOL/L (ref 3.5–5.1)
PROCALCITONIN SERPL IA-MCNC: <0.02 NG/ML
PROT SERPL-MCNC: 7.1 G/DL (ref 6–8.4)
PROTHROMBIN TIME: 10.9 SEC (ref 9–12.5)
RA MAJOR: 5 CM
RA WIDTH: 4 CM
RBC # BLD AUTO: 3.55 M/UL (ref 4.6–6.2)
RBC # BLD AUTO: 3.58 M/UL (ref 4.6–6.2)
RBC # BLD AUTO: 3.71 M/UL (ref 4.6–6.2)
RIGHT VENTRICULAR END-DIASTOLIC DIMENSION: 3.77 CM
RV TISSUE DOPPLER FREE WALL SYSTOLIC VELOCITY 1 (APICAL 4 CHAMBER VIEW): 10.35 CM/S
SINUS: 3.54 CM
SODIUM SERPL-SCNC: 139 MMOL/L (ref 136–145)
STJ: 3.28 CM
TDI LATERAL: 0.15 M/S
TDI SEPTAL: 0.17 M/S
TDI: 0.16 M/S
TR MAX PG: 38 MMHG
TRICUSPID ANNULAR PLANE SYSTOLIC EXCURSION: 2.15 CM
TROPONIN I SERPL DL<=0.01 NG/ML-MCNC: 0.03 NG/ML (ref 0–0.03)
TROPONIN I SERPL DL<=0.01 NG/ML-MCNC: 0.03 NG/ML (ref 0–0.03)
TROPONIN I SERPL DL<=0.01 NG/ML-MCNC: 0.04 NG/ML (ref 0–0.03)
TROPONIN I SERPL DL<=0.01 NG/ML-MCNC: 0.04 NG/ML (ref 0–0.03)
WBC # BLD AUTO: 5.63 K/UL (ref 3.9–12.7)
WBC # BLD AUTO: 5.96 K/UL (ref 3.9–12.7)
WBC # BLD AUTO: 7.4 K/UL (ref 3.9–12.7)

## 2022-09-18 PROCEDURE — 96365 THER/PROPH/DIAG IV INF INIT: CPT

## 2022-09-18 PROCEDURE — 85025 COMPLETE CBC W/AUTO DIFF WBC: CPT | Mod: 91 | Performed by: STUDENT IN AN ORGANIZED HEALTH CARE EDUCATION/TRAINING PROGRAM

## 2022-09-18 PROCEDURE — 85520 HEPARIN ASSAY: CPT | Performed by: STUDENT IN AN ORGANIZED HEALTH CARE EDUCATION/TRAINING PROGRAM

## 2022-09-18 PROCEDURE — 85025 COMPLETE CBC W/AUTO DIFF WBC: CPT | Performed by: NURSE PRACTITIONER

## 2022-09-18 PROCEDURE — 96366 THER/PROPH/DIAG IV INF ADDON: CPT

## 2022-09-18 PROCEDURE — 85610 PROTHROMBIN TIME: CPT | Performed by: STUDENT IN AN ORGANIZED HEALTH CARE EDUCATION/TRAINING PROGRAM

## 2022-09-18 PROCEDURE — 83735 ASSAY OF MAGNESIUM: CPT | Performed by: NURSE PRACTITIONER

## 2022-09-18 PROCEDURE — 99225 PR SUBSEQUENT OBSERVATION CARE,LEVEL II: CPT | Mod: ,,, | Performed by: STUDENT IN AN ORGANIZED HEALTH CARE EDUCATION/TRAINING PROGRAM

## 2022-09-18 PROCEDURE — 86140 C-REACTIVE PROTEIN: CPT | Performed by: NURSE PRACTITIONER

## 2022-09-18 PROCEDURE — G0378 HOSPITAL OBSERVATION PER HR: HCPCS

## 2022-09-18 PROCEDURE — 25000003 PHARM REV CODE 250: Performed by: NURSE PRACTITIONER

## 2022-09-18 PROCEDURE — 63600175 PHARM REV CODE 636 W HCPCS: Performed by: STUDENT IN AN ORGANIZED HEALTH CARE EDUCATION/TRAINING PROGRAM

## 2022-09-18 PROCEDURE — 80053 COMPREHEN METABOLIC PANEL: CPT | Performed by: NURSE PRACTITIONER

## 2022-09-18 PROCEDURE — 84145 PROCALCITONIN (PCT): CPT | Performed by: NURSE PRACTITIONER

## 2022-09-18 PROCEDURE — 99225 PR SUBSEQUENT OBSERVATION CARE,LEVEL II: ICD-10-PCS | Mod: ,,, | Performed by: STUDENT IN AN ORGANIZED HEALTH CARE EDUCATION/TRAINING PROGRAM

## 2022-09-18 PROCEDURE — 96375 TX/PRO/DX INJ NEW DRUG ADDON: CPT | Mod: 59

## 2022-09-18 PROCEDURE — 85730 THROMBOPLASTIN TIME PARTIAL: CPT | Performed by: STUDENT IN AN ORGANIZED HEALTH CARE EDUCATION/TRAINING PROGRAM

## 2022-09-18 PROCEDURE — 63600175 PHARM REV CODE 636 W HCPCS: Performed by: NURSE PRACTITIONER

## 2022-09-18 PROCEDURE — 84484 ASSAY OF TROPONIN QUANT: CPT | Performed by: NURSE PRACTITIONER

## 2022-09-18 PROCEDURE — 25000003 PHARM REV CODE 250: Performed by: STUDENT IN AN ORGANIZED HEALTH CARE EDUCATION/TRAINING PROGRAM

## 2022-09-18 PROCEDURE — 36415 COLL VENOUS BLD VENIPUNCTURE: CPT | Performed by: NURSE PRACTITIONER

## 2022-09-18 PROCEDURE — 36415 COLL VENOUS BLD VENIPUNCTURE: CPT | Performed by: STUDENT IN AN ORGANIZED HEALTH CARE EDUCATION/TRAINING PROGRAM

## 2022-09-18 PROCEDURE — 85652 RBC SED RATE AUTOMATED: CPT | Performed by: NURSE PRACTITIONER

## 2022-09-18 PROCEDURE — 96367 TX/PROPH/DG ADDL SEQ IV INF: CPT

## 2022-09-18 RX ORDER — HEPARIN SODIUM,PORCINE/D5W 25000/250
0-40 INTRAVENOUS SOLUTION INTRAVENOUS CONTINUOUS
Status: DISCONTINUED | OUTPATIENT
Start: 2022-09-18 | End: 2022-09-20 | Stop reason: HOSPADM

## 2022-09-18 RX ORDER — CLOPIDOGREL BISULFATE 75 MG/1
75 TABLET ORAL DAILY
Status: DISCONTINUED | OUTPATIENT
Start: 2022-09-19 | End: 2022-09-20 | Stop reason: HOSPADM

## 2022-09-18 RX ORDER — ENOXAPARIN SODIUM 100 MG/ML
40 INJECTION SUBCUTANEOUS EVERY 12 HOURS
Status: DISCONTINUED | OUTPATIENT
Start: 2022-09-18 | End: 2022-09-18

## 2022-09-18 RX ORDER — POTASSIUM CHLORIDE 20 MEQ/1
40 TABLET, EXTENDED RELEASE ORAL ONCE
Status: COMPLETED | OUTPATIENT
Start: 2022-09-18 | End: 2022-09-18

## 2022-09-18 RX ORDER — POTASSIUM CHLORIDE 7.45 MG/ML
10 INJECTION INTRAVENOUS
Status: ACTIVE | OUTPATIENT
Start: 2022-09-18 | End: 2022-09-18

## 2022-09-18 RX ORDER — LOSARTAN POTASSIUM 50 MG/1
100 TABLET ORAL DAILY
Status: DISCONTINUED | OUTPATIENT
Start: 2022-09-18 | End: 2022-09-20 | Stop reason: HOSPADM

## 2022-09-18 RX ORDER — TALC
6 POWDER (GRAM) TOPICAL NIGHTLY PRN
Status: DISCONTINUED | OUTPATIENT
Start: 2022-09-18 | End: 2022-09-20 | Stop reason: HOSPADM

## 2022-09-18 RX ORDER — PROCHLORPERAZINE EDISYLATE 5 MG/ML
5 INJECTION INTRAMUSCULAR; INTRAVENOUS EVERY 6 HOURS PRN
Status: DISCONTINUED | OUTPATIENT
Start: 2022-09-18 | End: 2022-09-20 | Stop reason: HOSPADM

## 2022-09-18 RX ORDER — SODIUM CHLORIDE 0.9 % (FLUSH) 0.9 %
10 SYRINGE (ML) INJECTION EVERY 12 HOURS PRN
Status: DISCONTINUED | OUTPATIENT
Start: 2022-09-18 | End: 2022-09-20 | Stop reason: HOSPADM

## 2022-09-18 RX ORDER — CLOPIDOGREL BISULFATE 75 MG/1
300 TABLET ORAL ONCE
Status: COMPLETED | OUTPATIENT
Start: 2022-09-18 | End: 2022-09-18

## 2022-09-18 RX ADMIN — Medication 1 CAPSULE: at 08:09

## 2022-09-18 RX ADMIN — ISOSORBIDE DINITRATE 10 MG: 10 TABLET ORAL at 08:09

## 2022-09-18 RX ADMIN — CEFTRIAXONE 1 G: 1 INJECTION, SOLUTION INTRAVENOUS at 04:09

## 2022-09-18 RX ADMIN — HYDRALAZINE HYDROCHLORIDE 25 MG: 25 TABLET, FILM COATED ORAL at 08:09

## 2022-09-18 RX ADMIN — GABAPENTIN 100 MG: 100 CAPSULE ORAL at 08:09

## 2022-09-18 RX ADMIN — CLOPIDOGREL 300 MG: 75 TABLET, FILM COATED ORAL at 03:09

## 2022-09-18 RX ADMIN — APIXABAN 5 MG: 5 TABLET, FILM COATED ORAL at 08:09

## 2022-09-18 RX ADMIN — POTASSIUM BICARBONATE 50 MEQ: 977.5 TABLET, EFFERVESCENT ORAL at 04:09

## 2022-09-18 RX ADMIN — POTASSIUM CHLORIDE 40 MEQ: 1500 TABLET, EXTENDED RELEASE ORAL at 10:09

## 2022-09-18 RX ADMIN — ATORVASTATIN CALCIUM 80 MG: 20 TABLET, FILM COATED ORAL at 08:09

## 2022-09-18 RX ADMIN — POTASSIUM CHLORIDE 40 MEQ: 1500 TABLET, EXTENDED RELEASE ORAL at 02:09

## 2022-09-18 RX ADMIN — INSULIN DETEMIR 35 UNITS: 100 INJECTION, SOLUTION SUBCUTANEOUS at 08:09

## 2022-09-18 RX ADMIN — ISOSORBIDE DINITRATE 10 MG: 10 TABLET ORAL at 02:09

## 2022-09-18 RX ADMIN — MAGNESIUM SULFATE 2 G: 2 INJECTION INTRAVENOUS at 12:09

## 2022-09-18 RX ADMIN — LOSARTAN POTASSIUM 100 MG: 50 TABLET, FILM COATED ORAL at 08:09

## 2022-09-18 RX ADMIN — HYDRALAZINE HYDROCHLORIDE 25 MG: 25 TABLET, FILM COATED ORAL at 02:09

## 2022-09-18 RX ADMIN — HEPARIN SODIUM 12 UNITS/KG/HR: 10000 INJECTION, SOLUTION INTRAVENOUS at 03:09

## 2022-09-18 RX ADMIN — OXYCODONE HYDROCHLORIDE AND ACETAMINOPHEN 500 MG: 500 TABLET ORAL at 08:09

## 2022-09-18 RX ADMIN — ASPIRIN 81 MG: 81 TABLET, COATED ORAL at 08:09

## 2022-09-18 RX ADMIN — TAMSULOSIN HYDROCHLORIDE 0.4 MG: 0.4 CAPSULE ORAL at 08:09

## 2022-09-18 NOTE — ASSESSMENT & PLAN NOTE
-Received ASA in the ED, continue daily.   -Troponin 0.018>0.010, continue to trend.  -Reviewed EKG.  -Continuous telemetry monitoring.  -D dimer 1.55  -PRN EKG for chest pain  - TTE  findings 9/18   1 - Normal left ventricular systolic function (EF 60-65%).     2 - Normal left ventricular diastolic function.     3 - Normal right ventricular systolic function .     4 - Trivial aortic regurgitation.     5 - Trivial tricuspid regurgitation.     6 - The estimated PA systolic pressure is 26 mmHg.     7 - Mild left atrial enlargement.      - CTA:   Artifact limited study with suboptimal opacification of the pulmonary arteries, however there is no evidence of large pulmonary thromboembolism.  Further evaluation/follow-up as clinically warranted.Minimal patchy ground-glass opacities mainly in the left lower lobe could represent early infectious/inflammatory change.Dilatation of pulmonary trunk, which can represent pulmonary artery hypertension.Mediastinal lymphadenopathy, stable from prior.    Plan:  - Trops positive. Treat as NSTEMI. Loaded with plavix. Start plavix 75mg qd from tomorrow. Start heparin drip per ACS protocol. Consulted IC for eval for cath since patient ahs risk factors. Recs as follows.  Patient is currently chest pain-free.  Troponins back down to normal.  No EKG changes concerning for ischemia or infarctions.  Echocardiogram unremarkable.  -recommend medical management with aspirin, Plavix (triple therapy x1 week then stop aspirin then continue on Plavix and Eliquis)  -continue statin.  Cannot do beta-blocker given type 1 Mobitz  -patient may follow-up with me in the clinic  -if patient becomes symptomatic, please feel free to reach out to Interventional Cardiology for any questions or concerns.    -Will likely order stress test in outpatient setting  -recommend strict glycemic control    - Check lipid panel and tsh . AIC 9  - Keep K >4and Mg >2

## 2022-09-18 NOTE — ASSESSMENT & PLAN NOTE
-Hypertensive in the ED, received home medications.  -Continue hydralazine, isodil, losartan, and nifedipine.  -Monitor BP closely.

## 2022-09-18 NOTE — PROGRESS NOTES
"[facility] ([address] Medicine  Progress Note    Patient Name: Saji Castañeda  MRN: [id number]  Patient Class: OP- Observation   Admission Date: 9/17/2022  Length of Stay: 0 days  Attending Physician: Merlin Kearney MD  Primary Care Provider: Mart Escalante MD        Subjective:     Principal Problem:Chest pain        HPI:  Saji Castañeda is a 72 y.o. male with a PMHx of DM2, PVD, HLD, CKD, Chronic L ankle/foot osteomyelitis, and RUE DVT who presents to the ED with complaints of chest pain. The patient reports he developed localized "stabbing" left lower sternal chest pain today around 9am while sitting in his wheelchair watching TV. Pain has been waxing and waning. Denies any aggravating or alleviating factors. Currently undergoing wound care treatments for BLE wounds. Last dressings were changed 2 days ago. No recent medication changes. The patient does endorse mild RICCI and non productive cough. Denies associated fever, SOB, abdominal pain, nausea, vomiting, urinary or bowel movement changes.    In the ED, patient noted to be hypertensive, otherwise VSSAF. WBC 3.58k. Potassium 3.0. Glucose 127. D dimer 1.55. TSH WNL. BNP 63. Troponin 0.018>0.010. EKG shows sinus rhythm, rate of 54 with 2nd degree Mobitz type 1, similar to previous. CXR with pulmonary findings are likely the result of shallow inspiratory effort and habitus, no large focal consolidation. CTA chest in process. The patient received ASA and potassium replacement.      Overview/Hospital Course:  No notes on file    Interval History: Patient remains CP free.Denies SOB, F/C.    Review of Systems   Constitutional:  Negative for appetite change, chills, fatigue, fever and unexpected weight change.   HENT:  Negative for congestion, rhinorrhea, sore throat and trouble swallowing.    Eyes:  Negative for photophobia and visual disturbance.   Respiratory:  Negative for cough (non productive), shortness of breath and wheezing.         " +mild RICCI   Cardiovascular:  Positive for leg swelling (chronic). Negative for palpitations.   Gastrointestinal:  Negative for abdominal distention, abdominal pain, diarrhea, nausea and vomiting.   Genitourinary:  Negative for dysuria, flank pain, frequency and hematuria.   Musculoskeletal:  Negative for arthralgias, back pain, gait problem and myalgias.   Skin:  Positive for wound (BLE). Negative for color change, pallor and rash.   Neurological:  Negative for dizziness, speech difficulty, weakness, light-headedness and numbness.   Psychiatric/Behavioral:  Negative for agitation, confusion and suicidal ideas. The patient is not nervous/anxious.    Objective:     Vital Signs (Most Recent):  Temp: 99.1 °F (37.3 °C) (09/18/22 1150)  Pulse: 71 (09/18/22 1150)  Resp: 18 (09/18/22 1150)  BP: (!) 149/71 (09/18/22 1150)  SpO2: (!) 92 % (09/18/22 1150)   Vital Signs (24h Range):  Temp:  [98 °F (36.7 °C)-99.7 °F (37.6 °C)] 99.1 °F (37.3 °C)  Pulse:  [51-82] 71  Resp:  [16-20] 18  SpO2:  [87 %-97 %] 92 %  BP: (143-187)/(64-83) 149/71     Weight: 124.3 kg (274 lb)  Body mass index is 40.46 kg/m².    Intake/Output Summary (Last 24 hours) at 9/18/2022 1337  Last data filed at 9/18/2022 0500  Gross per 24 hour   Intake --   Output 400 ml   Net -400 ml      Physical Exam  Vitals and nursing note reviewed.   Constitutional:       General: He is not in acute distress.     Appearance: He is obese. He is not toxic-appearing or diaphoretic.   HENT:      Head: Normocephalic and atraumatic.      Nose: Nose normal.      Mouth/Throat:      Mouth: Mucous membranes are moist.   Eyes:      Pupils: Pupils are equal, round, and reactive to light.   Cardiovascular:      Rate and Rhythm: Normal rate and regular rhythm.   Pulmonary:      Effort: Pulmonary effort is normal. No respiratory distress.      Breath sounds: No wheezing, rhonchi or rales.   Abdominal:      General: Bowel sounds are normal. There is no distension.      Palpations: Abdomen  is soft.      Tenderness: There is no abdominal tenderness. There is no guarding.   Musculoskeletal:         General: Deformity (L foot amputation) present. Normal range of motion.      Cervical back: Normal range of motion.      Right lower leg: Edema present.      Left lower leg: Edema present.   Skin:     General: Skin is warm and dry.      Capillary Refill: Capillary refill takes 2 to 3 seconds.      Comments: BLE covered with dressings, CDI   Neurological:      General: No focal deficit present.      Mental Status: He is alert and oriented to person, place, and time.      Sensory: Sensory deficit present.      Motor: No weakness.   Psychiatric:         Mood and Affect: Mood normal.         Behavior: Behavior normal.       Computed MELD-Na score unavailable. Necessary lab results were not found in the last year.  Computed MELD score unavailable. Necessary lab results were not found in the last year.    Significant Labs:  CBC:  Recent Labs   Lab 09/17/22  1609 09/18/22  0240 09/18/22  1153   WBC 3.58* 7.40 5.63   HGB 13.8* 9.3* 9.6*   HCT 44.7 28.4* 30.4*    332 316     CMP:  Recent Labs   Lab 09/17/22  1609 09/18/22  0240    139   K 3.0* 3.1*   CL 98 98   CO2 31* 30*   * 74   BUN 15 14   CREATININE 1.4 1.1   CALCIUM 8.6* 8.9   PROT 7.4 7.1   ALBUMIN 2.8* 2.6*   BILITOT 0.4 0.3   ALKPHOS 79 75   AST 12 12   ALT 6* 7*   ANIONGAP 11 11       Assessment/Plan:      * Chest pain  -Received ASA in the ED, continue daily.   -Troponin 0.018>0.010, continue to trend.  -Reviewed EKG.  -Continuous telemetry monitoring.  -D dimer 1.55  -PRN EKG for chest pain  - TTE  findings 9/18   1 - Normal left ventricular systolic function (EF 60-65%).     2 - Normal left ventricular diastolic function.     3 - Normal right ventricular systolic function .     4 - Trivial aortic regurgitation.     5 - Trivial tricuspid regurgitation.     6 - The estimated PA systolic pressure is 26 mmHg.     7 - Mild left atrial  enlargement.      - CTA:   Artifact limited study with suboptimal opacification of the pulmonary arteries, however there is no evidence of large pulmonary thromboembolism.  Further evaluation/follow-up as clinically warranted.Minimal patchy ground-glass opacities mainly in the left lower lobe could represent early infectious/inflammatory change.Dilatation of pulmonary trunk, which can represent pulmonary artery hypertension.Mediastinal lymphadenopathy, stable from prior.         Acute deep vein thrombosis (DVT) of right upper extremity  -Diagnosed during hospitalization in June.  -Continue eliquis.    Morbid obesity  Body mass index is 40.46 kg/m². Morbid obesity complicates all aspects of disease management from diagnostic modalities to treatment. Weight loss encouraged and health benefits explained to patient.     Hypokalemia  Patient has hypokalemia which is currently uncontrolled. Last electrolytes reviewed- Recent Labs   Lab 09/17/22  1609   K 3.0*   . Will replace potassium and monitor electrolytes closely. Continuous telemetry.    Osteomyelitis of left lower extremity  Pt has a PMHx left midtarsal amputation 2010, left calcaneal osteomyelitis s/p treatment in the past who was admitted in June for infected left heel ulceration. Tib/fib xray of the left leg notable for osteomyelitis of the posterior calcaneus. Vascular studies were concerning for left lower leg arterial stenosis and calf vessel narrowing/occulsions. Wound culture returned positive for GBS, MRSA, prevotella and porphromonas. Bone culture grew Staph epidermidis. Bone never sent for pathology. Vascular surgery recommended AKA vs BKA. Patient decided to attempt abx and deferred surgery.  He was discharged to [facility] on Daptomycin x 6 weeks and PO Flagyl x 14 days. Daptomycin was extended for 2 additional weeks and cefepime added for 2 weeks.  -Follows outpatient with podiatry and ID.  -Wound care consult.    Peripheral arterial  disease  -Continue home statin and ASA    Asymptomatic Mobitz (type) I (Wenckebach's) atrioventricular block  -Chronic issue.  -Avoid BB or CCB.    Anemia of chronic disease  Patient's anemia is currently controlled. S/p 0 units of PRBCs.   Current CBC reviewed-   Lab Results   Component Value Date    HGB 13.8 (L) 09/17/2022    HCT 44.7 09/17/2022     Monitor serial CBC and transfuse if patient becomes hemodynamically unstable, symptomatic or H/H drops below 7/21.     Chronic kidney disease, stage III (moderate)  Creatine stable for now. BMP reviewed- noted Estimated Creatinine Clearance: 61.2 mL/min (based on SCr of 1.4 mg/dL). according to latest data. Monitor UOP and serial BMP and adjust therapy as needed. Renally dose meds.    Type 2 diabetes, uncontrolled, with neuropathy  Patient's FSGs are uncontrolled due to hyperglycemia on current medication regimen.  Last A1c reviewed-   Lab Results   Component Value Date    HGBA1C 9.9 (H) 09/17/2022     Most recent fingerstick glucose reviewed- No results for input(s): POCTGLUCOSE in the last 24 hours.  Current correctional scale  Low  Maintain anti-hyperglycemic dose as follows-   Antihyperglycemics (From admission, onward)    Start     Stop Route Frequency Ordered    09/18/22 0715  insulin aspart U-100 pen 12 Units         -- SubQ 3 times daily with meals 09/17/22 2152 09/17/22 2300  insulin detemir U-100 pen 40 Units         -- SubQ Nightly 09/17/22 2235    09/17/22 2252  insulin aspart U-100 pen 0-5 Units         -- SubQ Before meals & nightly PRN 09/17/22 2152        Hold Oral hypoglycemics while patient is in the hospital.  -Accuchecks AC/HS  -Diabetic/cardiac diet    Essential hypertension  -Hypertensive in the ED, received home medications.  -Continue hydralazine, isodil, losartan, and nifedipine.  -Monitor BP closely.      VTE Risk Mitigation (From admission, onward)         Ordered     apixaban tablet 5 mg  2 times daily         09/18/22 0121     IP VTE  HIGH RISK PATIENT  Once         09/18/22 0108     Place sequential compression device  Until discontinued         09/18/22 0108                Discharge Planning   OXANA:      Code Status: Full Code   Is the patient medically ready for discharge?:     Reason for patient still in hospital (select all that apply): Patient trending condition                     Merlin Kearney MD  Department of Hospital Medicine   [facility] - Intensive Care ([place]-[facility])

## 2022-09-18 NOTE — CARE UPDATE
RAPID RESPONSE NURSE PROACTIVE ROUNDING NOTE       Time of Visit: 0850    Admit Date: 2022  LOS: 0  Code Status: Full Code   Date of Visit: 2022  : 1950  Age: 72 y.o.  Sex: male  Race: Black or   Bed: 75663/01310 A:   MRN: [id number]  Was the patient discharged from an ICU this admission? No   Was the patient discharged from a PACU within last 24 hours? No   Did the patient receive conscious sedation/general anesthesia in last 24 hours? No  Was the patient in the ED within the past 24 hours? Yes  Was the patient on NIPPV within the past 24 hours? No   Attending Physician: Merlin Kearney MD  Primary Service: [facility]   Time spent at the bedside: < 15 min    SITUATION    Notified by telemetry via phone call.  Reason for alert: bradycardia  Called to evaluate the patient for Dysrythmia    BACKGROUND     Why is the patient in the hospital?: Chest pain    Patient has a past medical history of Arthritis, Diabetes mellitus, Diabetes mellitus, type 2, Hyperlipidemia, and Osteomyelitis.    Last Vitals:  Temp: 99.7 °F (37.6 °C) (817)  Pulse: 51 (817)  Resp: 20 (817)  BP: 145/65 (817)  SpO2: 96 % (817)    24 Hours Vitals Range:  Temp:  [98 °F (36.7 °C)-99.7 °F (37.6 °C)]   Pulse:  [51-82]   Resp:  [16-20]   BP: (143-187)/(64-83)   SpO2:  [87 %-97 %]     Labs:  Recent Labs     22  1609 22  0240   WBC 3.58* 7.40   HGB 13.8* 9.3*   HCT 44.7 28.4*    332       Recent Labs     22  1609 22  0240    139   K 3.0* 3.1*   CL 98 98   CO2 31* 30*   CREATININE 1.4 1.1   * 74   PHOS 3.5  --    MG 1.7 1.8        No results for input(s): PH, PCO2, PO2, HCO3, POCSATURATED, BE in the last 72 hours.     ASSESSMENT    Notified by telemetry of pt bradycardia HR 41. Upon assessment, pt AOX4, asymptomatic, /65. RN Priya at bedside, notified physician who placed additional orders. Pt on continuous tele as well as bed side  monitor. HR at time of visit 54. Repeat CBC ordered per order set for 4pt drop in less than 12 hours.    Physical Exam  Eyes:      Pupils: Pupils are equal, round, and reactive to light.   Cardiovascular:      Rate and Rhythm: Bradycardia present.   Pulmonary:      Effort: Pulmonary effort is normal.   Skin:     General: Skin is warm and dry.      Capillary Refill: Capillary refill takes less than 2 seconds.   Neurological:      General: No focal deficit present.      Mental Status: He is alert.       INTERVENTIONS    The patient was seen for Cardiac problem. Staff concerns included bradycardia. The following interventions were performed: continuous cardiac monitoring continued and repeat VS, neuro exam.    RECOMMENDATIONS    Monitor HR and BP per unit policy as well as neuro status. Repeat EKG for any changes noted on telemetry. Repeat labs per primary team.    PROVIDER ESCALATION    Yes/No  yes    Orders received and case discussed with Dr. Kearney per primary RN .    Disposition: Remain in room 51519.    FOLLOW-UP    bedside RN, Priya  updated on plan of care. Instructed to call the Rapid Response Nurse, Rosalio Granados RN at [phone] for additional questions or concerns.

## 2022-09-18 NOTE — ASSESSMENT & PLAN NOTE
-Received ASA in the ED, continue daily.   -Troponin 0.018>0.010, continue to trend.  -Reviewed EKG.  -Continuous telemetry monitoring.  -ECHO ordered.  -D dimer 1.55--- CTA in process, although low suspicion given pt has been compliant with eliquis.  -PRN EKG for chest pain.

## 2022-09-18 NOTE — ASSESSMENT & PLAN NOTE
Patient has hypokalemia which is currently uncontrolled. Last electrolytes reviewed-   Recent Labs   Lab 09/17/22  1609 09/18/22  0240   K 3.0* 3.1*   . Will replace potassium and monitor electrolytes closely. Continuous telemetry.

## 2022-09-18 NOTE — ASSESSMENT & PLAN NOTE
Patient's FSGs are uncontrolled due to hyperglycemia on current medication regimen.  Last A1c reviewed-   Lab Results   Component Value Date    HGBA1C 9.9 (H) 09/17/2022     Most recent fingerstick glucose reviewed- No results for input(s): POCTGLUCOSE in the last 24 hours.  Current correctional scale  Low  Maintain anti-hyperglycemic dose as follows-   Antihyperglycemics (From admission, onward)    Start     Stop Route Frequency Ordered    09/18/22 0715  insulin aspart U-100 pen 12 Units         -- SubQ 3 times daily with meals 09/17/22 2152 09/17/22 2300  insulin detemir U-100 pen 40 Units         -- SubQ Nightly 09/17/22 2235    09/17/22 2252  insulin aspart U-100 pen 0-5 Units         -- SubQ Before meals & nightly PRN 09/17/22 2152        Hold Oral hypoglycemics while patient is in the hospital.  -Accuchecks AC/HS  -Diabetic/cardiac diet

## 2022-09-18 NOTE — ASSESSMENT & PLAN NOTE
Patient has hypokalemia which is currently uncontrolled. Last electrolytes reviewed- Recent Labs   Lab 09/17/22  1609   K 3.0*   . Will replace potassium and monitor electrolytes closely. Continuous telemetry.

## 2022-09-18 NOTE — SUBJECTIVE & OBJECTIVE
Interval History: Patient remains CP free.Denies SOB, F/C.    Review of Systems   Constitutional:  Negative for appetite change, chills, fatigue, fever and unexpected weight change.   HENT:  Negative for congestion, rhinorrhea, sore throat and trouble swallowing.    Eyes:  Negative for photophobia and visual disturbance.   Respiratory:  Negative for cough (non productive), shortness of breath and wheezing.         +mild RICCI   Cardiovascular:  Positive for leg swelling (chronic). Negative for palpitations.   Gastrointestinal:  Negative for abdominal distention, abdominal pain, diarrhea, nausea and vomiting.   Genitourinary:  Negative for dysuria, flank pain, frequency and hematuria.   Musculoskeletal:  Negative for arthralgias, back pain, gait problem and myalgias.   Skin:  Positive for wound (BLE). Negative for color change, pallor and rash.   Neurological:  Negative for dizziness, speech difficulty, weakness, light-headedness and numbness.   Psychiatric/Behavioral:  Negative for agitation, confusion and suicidal ideas. The patient is not nervous/anxious.    Objective:     Vital Signs (Most Recent):  Temp: 99.1 °F (37.3 °C) (09/18/22 1150)  Pulse: 71 (09/18/22 1150)  Resp: 18 (09/18/22 1150)  BP: (!) 149/71 (09/18/22 1150)  SpO2: (!) 92 % (09/18/22 1150)   Vital Signs (24h Range):  Temp:  [98 °F (36.7 °C)-99.7 °F (37.6 °C)] 99.1 °F (37.3 °C)  Pulse:  [51-82] 71  Resp:  [16-20] 18  SpO2:  [87 %-97 %] 92 %  BP: (143-187)/(64-83) 149/71     Weight: 124.3 kg (274 lb)  Body mass index is 40.46 kg/m².    Intake/Output Summary (Last 24 hours) at 9/18/2022 1337  Last data filed at 9/18/2022 0500  Gross per 24 hour   Intake --   Output 400 ml   Net -400 ml      Physical Exam  Vitals and nursing note reviewed.   Constitutional:       General: He is not in acute distress.     Appearance: He is obese. He is not toxic-appearing or diaphoretic.   HENT:      Head: Normocephalic and atraumatic.      Nose: Nose normal.       Mouth/Throat:      Mouth: Mucous membranes are moist.   Eyes:      Pupils: Pupils are equal, round, and reactive to light.   Cardiovascular:      Rate and Rhythm: Normal rate and regular rhythm.   Pulmonary:      Effort: Pulmonary effort is normal. No respiratory distress.      Breath sounds: No wheezing, rhonchi or rales.   Abdominal:      General: Bowel sounds are normal. There is no distension.      Palpations: Abdomen is soft.      Tenderness: There is no abdominal tenderness. There is no guarding.   Musculoskeletal:         General: Deformity (L foot amputation) present. Normal range of motion.      Cervical back: Normal range of motion.      Right lower leg: Edema present.      Left lower leg: Edema present.   Skin:     General: Skin is warm and dry.      Capillary Refill: Capillary refill takes 2 to 3 seconds.      Comments: BLE covered with dressings, CDI   Neurological:      General: No focal deficit present.      Mental Status: He is alert and oriented to person, place, and time.      Sensory: Sensory deficit present.      Motor: No weakness.   Psychiatric:         Mood and Affect: Mood normal.         Behavior: Behavior normal.       Computed MELD-Na score unavailable. Necessary lab results were not found in the last year.  Computed MELD score unavailable. Necessary lab results were not found in the last year.    Significant Labs:  CBC:  Recent Labs   Lab 09/17/22  1609 09/18/22  0240 09/18/22  1153   WBC 3.58* 7.40 5.63   HGB 13.8* 9.3* 9.6*   HCT 44.7 28.4* 30.4*    332 316     CMP:  Recent Labs   Lab 09/17/22  1609 09/18/22  0240    139   K 3.0* 3.1*   CL 98 98   CO2 31* 30*   * 74   BUN 15 14   CREATININE 1.4 1.1   CALCIUM 8.6* 8.9   PROT 7.4 7.1   ALBUMIN 2.8* 2.6*   BILITOT 0.4 0.3   ALKPHOS 79 75   AST 12 12   ALT 6* 7*   ANIONGAP 11 11

## 2022-09-18 NOTE — ASSESSMENT & PLAN NOTE
Patient's anemia is currently controlled. S/p 0 units of PRBCs.   Current CBC reviewed-   Lab Results   Component Value Date    HGB 13.8 (L) 09/17/2022    HCT 44.7 09/17/2022     Monitor serial CBC and transfuse if patient becomes hemodynamically unstable, symptomatic or H/H drops below 7/21.

## 2022-09-18 NOTE — ED NOTES
Assumed care of patient. Pt denies complaints at this time.    Patient identifiers verified and correct for Saji Castañeda    LOC: The patient is awake, alert, and aware of environment. The patient is AOX4 and speaking appropriately.   APPEARANCE: No acute distress noted.   HEENT: WDL, PERRLA  PSYCHOSOCIAL: Patient is calm and cooperative. Denies SI/HI.  SKIN: The skin is warm, dry, color consistent with ethnicity. No breakdown or brusing visible.  RESPIRATORY: Airway is open and patent. Bilateral chest rise and fall. Respiratory rate even and unlabored.  No accessory muscle use noted.  CARDIAC: Bradycardic. No complaints of chest pain.  ABDOMEN/GI: Soft, non tender. No distention noted. Denies n/v/d.   URINARY:  Voids independently without difficulty. No complaints of frequency, urgency, burning, or blood in urine.   NEUROLOGIC: Eyes open spontaneously. Speech clear.  Able to follow commands, demonstrating ability to actively and appropriately communicate within context of current conversation. Symmetrical facial muscles. Movement is purposeful. Denies dizziness/lightheadedness.  MUSCULOSKELETAL: Amputation of left foot, Bandage to right LE.   PERIPHERAL VASCULAR: Cap refill <3 secs bilaterally. No peripheral edema noted. Denies numbness and tingling in extremities.

## 2022-09-18 NOTE — H&P
"[facility]  History & Physical    Patient Name: Saji Castañeda  MRN: [id number]  Patient Class: OP- Observation  Admission Date: 9/17/2022  Attending Physician: Merlin Kearney MD   Primary Care Provider: Mart Escalante MD         Patient information was obtained from patient, past medical records, and ER records.     Subjective:     Principal Problem:Chest pain    Chief Complaint:   Chief Complaint   Patient presents with    Chest Pain        HPI: Saji Castañeda is a 72 y.o. male with a PMHx of DM2, PVD, HLD, CKD, Chronic L ankle/foot osteomyelitis, and RUE DVT who presents to the ED with complaints of chest pain. The patient reports he developed localized "stabbing" left lower sternal chest pain today around 9am while sitting in his wheelchair watching TV. Pain has been waxing and waning. Denies any aggravating or alleviating factors. Currently undergoing wound care treatments for BLE wounds. Last dressings were changed 2 days ago. No recent medication changes. The patient does endorse mild RICCI and non productive cough. Denies associated fever, SOB, abdominal pain, nausea, vomiting, urinary or bowel movement changes.    In the ED, patient noted to be hypertensive, otherwise VSSAF. WBC 3.58k. Potassium 3.0. Glucose 127. D dimer 1.55. TSH WNL. BNP 63. Troponin 0.018>0.010. EKG shows sinus rhythm, rate of 54 with 2nd degree Mobitz type 1, similar to previous. CXR with pulmonary findings are likely the result of shallow inspiratory effort and habitus, no large focal consolidation. CTA chest in process. The patient received ASA and potassium replacement.    Past Medical History:   Diagnosis Date    Arthritis     legs    Diabetes mellitus     Diabetes mellitus, type 2     Hyperlipidemia     Osteomyelitis        Past Surgical History:   Procedure Laterality Date    ANGIOGRAPHY OF LOWER EXTREMITY N/A 2/3/2021    Procedure: Angiogram Extremity Bilateral;  Surgeon: Ernst Chacko MD;  " "Location: [facility] OR 2ND FLR;  Service: Peripheral Vascular;  Laterality: N/A;  7.4 mintues fluoroscopy time  816.15 mGy  170.17 Gycm2    AORTOGRAPHY WITH EXTREMITY RUNOFF Bilateral 2/3/2021    Procedure: AORTOGRAM, WITH EXTREMITY RUNOFF;  Surgeon: Ernst Chacko MD;  Location: [facility] OR 2ND FLR;  Service: Peripheral Vascular;  Laterality: Bilateral;    DEBRIDEMENT OF FOOT Left 2/23/2021    Procedure: DEBRIDEMENT, LEFT HEEL;  Surgeon: Mayra Schroeder DPM;  Location: [facility] OR 1ST FLR;  Service: Podiatry;  Laterality: Left;    FOOT AMPUTATION  October 2010    left high midfoot amputation       Review of patient's allergies indicates:  No Known Allergies    No current facility-administered medications on file prior to encounter.     Current Outpatient Medications on File Prior to Encounter   Medication Sig    acetaminophen (TYLENOL) 500 MG tablet Take 1,000 mg by mouth every 8 (eight) hours as needed for Pain.    ascorbic acid, vitamin C, (VITAMIN C) 500 MG tablet Take 500 mg by mouth once daily.    aspirin (ECOTRIN) 81 MG EC tablet Take 81 mg by mouth once daily.    BD ULTRA-FINE ADITYA PEN NEEDLE 32 gauge x 5/32" Ndle USE FOUR TIMES DAILY WITH MEALS AND NIGHTLY    blood sugar diagnostic (CONTOUR TEST STRIPS) Strp Inject 1 strip into the skin 2 (two) times daily before meals.    gabapentin (NEURONTIN) 100 MG capsule Take 2 capsules (200 mg total) by mouth 2 (two) times daily. (Patient taking differently: Take 100 mg by mouth once daily.)    hydrALAZINE (APRESOLINE) 25 MG tablet Take 1 tablet (25 mg total) by mouth 3 (three) times daily.    insulin aspart U-100 (NOVOLOG) 100 unit/mL (3 mL) InPn pen Inject 0-5 Units into the skin before meals and at bedtime as needed (Hyperglycemia). **LOW CORRECTION DOSE**  Blood Glucose  mg/dL                  Pre-meal                2200  151-200                0 unit                      0 unit  201-250                2 units                    1 unit  251-300                3 " units                    1 unit  301-350                4 units                    2 units  >350                     5 units                    3 units  Administer subcutaneously if needed at times designated by monitoring schedule.    insulin aspart U-100 (NOVOLOG) 100 unit/mL (3 mL) InPn pen Inject 20 Units into the skin 3 (three) times daily with meals.    isosorbide dinitrate (ISORDIL) 10 MG tablet Take 1 tablet (10 mg total) by mouth 3 (three) times daily.    Lactobacillus rhamnosus GG (CULTURELLE) 10 billion cell capsule Take 1 capsule by mouth 2 (two) times daily.    LANTUS SOLOSTAR U-100 INSULIN glargine 100 units/mL SubQ pen Inject 45 Units into the skin every evening.    MICROLET LANCET Misc     NIFEdipine (PROCARDIA-XL) 90 MG (OSM) 24 hr tablet Take 1 tablet (90 mg total) by mouth once daily.    rosuvastatin (CRESTOR) 40 MG Tab Take 40 mg by mouth once daily.    tamsulosin (FLOMAX) 0.4 mg Cap Take 1 capsule (0.4 mg total) by mouth once daily.    [DISCONTINUED] hydroCHLOROthiazide (HYDRODIURIL) 25 MG tablet Take 0.5 tablets (12.5 mg total) by mouth every Mon, Wed, Fri. Beginning on 7/4/2022     Family History       Problem Relation (Age of Onset)    Cancer Brother    Diabetes Mother, Sister    Heart disease Mother    Stroke Sister          Tobacco Use    Smoking status: Never    Smokeless tobacco: Never   Substance and Sexual Activity    Alcohol use: No     Comment: occassional    Drug use: No    Sexual activity: Not Currently     Review of Systems   Constitutional:  Negative for appetite change, chills, fatigue, fever and unexpected weight change.   HENT:  Negative for congestion, rhinorrhea, sore throat and trouble swallowing.    Eyes:  Negative for photophobia and visual disturbance.   Respiratory:  Positive for cough (non productive). Negative for shortness of breath and wheezing.         +mild RICCI   Cardiovascular:  Positive for chest pain and leg swelling (chronic). Negative for palpitations.    Gastrointestinal:  Negative for abdominal distention, abdominal pain, diarrhea, nausea and vomiting.   Genitourinary:  Negative for dysuria, flank pain, frequency and hematuria.   Musculoskeletal:  Negative for arthralgias, back pain, gait problem and myalgias.   Skin:  Positive for wound (BLE). Negative for color change, pallor and rash.   Neurological:  Negative for dizziness, speech difficulty, weakness, light-headedness and numbness.   Psychiatric/Behavioral:  Negative for agitation, confusion and suicidal ideas. The patient is not nervous/anxious.    Objective:     Vital Signs (Most Recent):  Temp: 98.7 °F (37.1 °C) (09/17/22 1939)  Pulse: (!) 53 (09/17/22 1939)  Resp: 16 (09/17/22 1939)  BP: (!) 165/73 (09/17/22 1702)  SpO2: 95 % (09/17/22 1939)   Vital Signs (24h Range):  Temp:  [98.7 °F (37.1 °C)-98.9 °F (37.2 °C)] 98.7 °F (37.1 °C)  Pulse:  [52-71] 53  Resp:  [16-20] 16  SpO2:  [95 %-97 %] 95 %  BP: (145-165)/(64-73) 165/73     Weight: 124.3 kg (274 lb)  Body mass index is 40.46 kg/m².    Physical Exam  Vitals and nursing note reviewed.   Constitutional:       General: He is not in acute distress.     Appearance: He is obese. He is not toxic-appearing or diaphoretic.   HENT:      Head: Normocephalic and atraumatic.      Nose: Nose normal.      Mouth/Throat:      Mouth: Mucous membranes are moist.   Eyes:      Pupils: Pupils are equal, round, and reactive to light.   Cardiovascular:      Rate and Rhythm: Normal rate and regular rhythm.   Pulmonary:      Effort: Pulmonary effort is normal. No respiratory distress.      Breath sounds: No wheezing, rhonchi or rales.   Abdominal:      General: Bowel sounds are normal. There is no distension.      Palpations: Abdomen is soft.      Tenderness: There is no abdominal tenderness. There is no guarding.   Musculoskeletal:         General: Deformity (L foot amputation) present. Normal range of motion.      Cervical back: Normal range of motion.      Right lower leg:  Edema present.      Left lower leg: Edema present.   Skin:     General: Skin is warm and dry.      Capillary Refill: Capillary refill takes 2 to 3 seconds.      Comments: BLE covered with dressings, CDI   Neurological:      General: No focal deficit present.      Mental Status: He is alert and oriented to person, place, and time.      Sensory: Sensory deficit present.      Motor: No weakness.   Psychiatric:         Mood and Affect: Mood normal.         Behavior: Behavior normal.         CRANIAL NERVES     CN III, IV, VI   Pupils are equal, round, and reactive to light.     Significant Labs: All pertinent labs within the past 24 hours have been reviewed.  CBC:   Recent Labs   Lab 09/17/22  1609   WBC 3.58*   HGB 13.8*   HCT 44.7        CMP:   Recent Labs   Lab 09/17/22  1609      K 3.0*   CL 98   CO2 31*   *   BUN 15   CREATININE 1.4   CALCIUM 8.6*   PROT 7.4   ALBUMIN 2.8*   BILITOT 0.4   ALKPHOS 79   AST 12   ALT 6*   ANIONGAP 11     Cardiac Markers:   Recent Labs   Lab 09/17/22  1609   BNP 63     Troponin:   Recent Labs   Lab 09/17/22  1609 09/17/22  1909   TROPONINI 0.018 0.010     TSH:   Recent Labs   Lab 09/17/22  1609   TSH 1.172       Significant Imaging: I have reviewed all pertinent imaging results/findings within the past 24 hours.    Imaging Results              CTA Chest Non-Coronary (PE Studies) (In process)  Result time 09/17/22 21:42:51                     X-Ray Chest AP Portable (Final result)  Result time 09/17/22 17:01:28      Final result by Milton Dove MD (09/17/22 17:01:28)                   Impression:      1. Pulmonary findings are likely the result of shallow inspiratory effort and habitus, no large focal consolidation.      Electronically signed by: Milton Dove MD  Date:    09/17/2022  Time:    17:01               Narrative:    EXAMINATION:  XR CHEST AP PORTABLE    CLINICAL HISTORY:  Chest pain, unspecified    TECHNIQUE:  Single frontal view of the chest was  performed.    COMPARISON:  06/24/2022    FINDINGS:  The cardiomediastinal silhouette is prominent, magnified by technique, similar to the previous examination.  There is elevation of the right hemidiaphragm..  There is no pleural effusion.  The trachea is midline.  The lungs are symmetrically expanded bilaterally with mildly coarse central hilar interstitial attenuation accentuated by shallow inspiratory effort.  There is bilateral basilar subsegmental atelectasis..  No large focal consolidation seen.  There is no pneumothorax.  The osseous structures are remarkable for degenerative changes..                                    Assessment/Plan:     * Chest pain  -Received ASA in the ED, continue daily.   -Troponin 0.018>0.010, continue to trend.  -Reviewed EKG.  -Continuous telemetry monitoring.  -ECHO ordered.  -D dimer 1.55--- CTA in process, although low suspicion given pt has been compliant with eliquis.  -PRN EKG for chest pain.    Acute deep vein thrombosis (DVT) of right upper extremity  -Diagnosed during hospitalization in June.  -Continue eliquis.    Morbid obesity  Body mass index is 40.46 kg/m². Morbid obesity complicates all aspects of disease management from diagnostic modalities to treatment. Weight loss encouraged and health benefits explained to patient.     Hypokalemia  Patient has hypokalemia which is currently uncontrolled. Last electrolytes reviewed- Recent Labs   Lab 09/17/22  1609   K 3.0*   . Will replace potassium and monitor electrolytes closely. Continuous telemetry.    Osteomyelitis of left lower extremity  Pt has a PMHx left midtarsal amputation 2010, left calcaneal osteomyelitis s/p treatment in the past who was admitted in June for infected left heel ulceration. Tib/fib xray of the left leg notable for osteomyelitis of the posterior calcaneus. Vascular studies were concerning for left lower leg arterial stenosis and calf vessel narrowing/occulsions. Wound culture returned positive for  GBS, MRSA, prevotella and porphromonas. Bone culture grew Staph epidermidis. Bone never sent for pathology. Vascular surgery recommended AKA vs BKA. Patient decided to attempt abx and deferred surgery.  He was discharged to [facility] on Daptomycin x 6 weeks and PO Flagyl x 14 days. Daptomycin was extended for 2 additional weeks and cefepime added for 2 weeks.  -Follows outpatient with podiatry and ID.  -Wound care consult.    Peripheral arterial disease  -Continue home statin and ASA    Asymptomatic Mobitz (type) I (Wenckebach's) atrioventricular block  -Chronic issue.  -Avoid BB or CCB.    Anemia of chronic disease  Patient's anemia is currently controlled. S/p 0 units of PRBCs.   Current CBC reviewed-   Lab Results   Component Value Date    HGB 13.8 (L) 09/17/2022    HCT 44.7 09/17/2022     Monitor serial CBC and transfuse if patient becomes hemodynamically unstable, symptomatic or H/H drops below 7/21.     Chronic kidney disease, stage III (moderate)  Creatine stable for now. BMP reviewed- noted Estimated Creatinine Clearance: 61.2 mL/min (based on SCr of 1.4 mg/dL). according to latest data. Monitor UOP and serial BMP and adjust therapy as needed. Renally dose meds.    Type 2 diabetes, uncontrolled, with neuropathy  Patient's FSGs are uncontrolled due to hyperglycemia on current medication regimen.  Last A1c reviewed-   Lab Results   Component Value Date    HGBA1C 9.9 (H) 09/17/2022     Most recent fingerstick glucose reviewed- No results for input(s): POCTGLUCOSE in the last 24 hours.  Current correctional scale  Low  Maintain anti-hyperglycemic dose as follows-   Antihyperglycemics (From admission, onward)      Start     Stop Route Frequency Ordered    09/18/22 0715  insulin aspart U-100 pen 12 Units         -- SubQ 3 times daily with meals 09/17/22 2152 09/17/22 2300  insulin detemir U-100 pen 40 Units         -- SubQ Nightly 09/17/22 2235    09/17/22 2252  insulin aspart U-100 pen 0-5 Units          -- SubQ Before meals & nightly PRN 09/17/22 2152          Hold Oral hypoglycemics while patient is in the hospital.  -Accuchecks AC/HS  -Diabetic/cardiac diet    Essential hypertension  -Hypertensive in the ED, received home medications.  -Continue hydralazine, isodil, losartan, and nifedipine.  -Monitor BP closely.    VTE Risk Mitigation (From admission, onward)           Ordered     apixaban tablet 5 mg  2 times daily         09/18/22 0121     IP VTE HIGH RISK PATIENT  Once         09/18/22 0108     Place sequential compression device  Until discontinued         09/18/22 0108                       Rachna Salcido NP  Department of Hospital Medicine   [facility]

## 2022-09-18 NOTE — ASSESSMENT & PLAN NOTE
Creatine stable for now. BMP reviewed- noted Estimated Creatinine Clearance: 61.2 mL/min (based on SCr of 1.4 mg/dL). according to latest data. Monitor UOP and serial BMP and adjust therapy as needed. Renally dose meds.

## 2022-09-18 NOTE — HPI
"Saji Castañeda is a 72 y.o. male with a PMHx of DM2, PVD, HLD, CKD, Chronic L ankle/foot osteomyelitis, and RUE DVT who presents to the ED with complaints of chest pain. The patient reports he developed localized "stabbing" left lower sternal chest pain today around 9am while sitting in his wheelchair watching TV. Pain has been waxing and waning. Denies any aggravating or alleviating factors. Currently undergoing wound care treatments for BLE wounds. Last dressings were changed 2 days ago. No recent medication changes. The patient does endorse mild RICCI and non productive cough. Denies associated fever, SOB, abdominal pain, nausea, vomiting, urinary or bowel movement changes.    In the ED, patient noted to be hypertensive, otherwise VSSAF. WBC 3.58k. Potassium 3.0. Glucose 127. D dimer 1.55. TSH WNL. BNP 63. Troponin 0.018>0.010. EKG shows sinus rhythm, rate of 54 with 2nd degree Mobitz type 1, similar to previous. CXR with pulmonary findings are likely the result of shallow inspiratory effort and habitus, no large focal consolidation. CTA chest in process. The patient received ASA and potassium replacement.  "

## 2022-09-18 NOTE — ASSESSMENT & PLAN NOTE
Body mass index is 40.46 kg/m². Morbid obesity complicates all aspects of disease management from diagnostic modalities to treatment. Weight loss encouraged and health benefits explained to patient.    .

## 2022-09-18 NOTE — ASSESSMENT & PLAN NOTE
Pt has a PMHx left midtarsal amputation 2010, left calcaneal osteomyelitis s/p treatment in the past who was admitted in June for infected left heel ulceration. Tib/fib xray of the left leg notable for osteomyelitis of the posterior calcaneus. Vascular studies were concerning for left lower leg arterial stenosis and calf vessel narrowing/occulsions. Wound culture returned positive for GBS, MRSA, prevotella and porphromonas. Bone culture grew Staph epidermidis. Bone never sent for pathology. Vascular surgery recommended AKA vs BKA. Patient decided to attempt abx and deferred surgery.  He was discharged to [facility] on Daptomycin x 6 weeks and PO Flagyl x 14 days. Daptomycin was extended for 2 additional weeks and cefepime added for 2 weeks.  -Follows outpatient with podiatry and ID.  -Wound care consult.

## 2022-09-18 NOTE — ASSESSMENT & PLAN NOTE
-Received ASA in the ED, continue daily.   -Troponin 0.018>0.010, continue to trend.  -Reviewed EKG.  -Continuous telemetry monitoring.  -D dimer 1.55  -PRN EKG for chest pain  - TTE  findings 9/18   1 - Normal left ventricular systolic function (EF 60-65%).     2 - Normal left ventricular diastolic function.     3 - Normal right ventricular systolic function .     4 - Trivial aortic regurgitation.     5 - Trivial tricuspid regurgitation.     6 - The estimated PA systolic pressure is 26 mmHg.     7 - Mild left atrial enlargement.      - CTA:   Artifact limited study with suboptimal opacification of the pulmonary arteries, however there is no evidence of large pulmonary thromboembolism.  Further evaluation/follow-up as clinically warranted.Minimal patchy ground-glass opacities mainly in the left lower lobe could represent early infectious/inflammatory change.Dilatation of pulmonary trunk, which can represent pulmonary artery hypertension.Mediastinal lymphadenopathy, stable from prior.

## 2022-09-18 NOTE — NURSING
Notified Dr. Kearney, patient HR on tele dips down to 40's, patient VSS, denies chest pain, BS 65 will hold meal bolus. Will cont to monitor patient

## 2022-09-18 NOTE — ED NOTES
Telemetry Verification   Patient placed on Telemetry Box  Verified with War Room  Box # [id number]       Rate    Rhythm

## 2022-09-18 NOTE — SUBJECTIVE & OBJECTIVE
"Past Medical History:   Diagnosis Date    Arthritis     legs    Diabetes mellitus     Diabetes mellitus, type 2     Hyperlipidemia     Osteomyelitis        Past Surgical History:   Procedure Laterality Date    ANGIOGRAPHY OF LOWER EXTREMITY N/A 2/3/2021    Procedure: Angiogram Extremity Bilateral;  Surgeon: Ernst Chacko MD;  Location: [facility] OR [address] [place] [address];  Service: Peripheral Vascular;  Laterality: N/A;  7.4 mintues fluoroscopy time  816.15 mGy  170.17 Gycm2    AORTOGRAPHY WITH EXTREMITY RUNOFF Bilateral 2/3/2021    Procedure: AORTOGRAM, WITH EXTREMITY RUNOFF;  Surgeon: Ernst Chacko MD;  Location: [facility] OR [address] [place] [address];  Service: Peripheral Vascular;  Laterality: Bilateral;    DEBRIDEMENT OF FOOT Left 2/23/2021    Procedure: DEBRIDEMENT, LEFT HEEL;  Surgeon: Mayra Schroeder DPM;  Location: [facility] OR [address] [place] [address];  Service: Podiatry;  Laterality: Left;    FOOT AMPUTATION  October 2010    left high midfoot amputation       Review of patient's allergies indicates:  No Known Allergies    No current facility-administered medications on file prior to encounter.     Current Outpatient Medications on File Prior to Encounter   Medication Sig    acetaminophen (TYLENOL) 500 MG tablet Take 1,000 mg by mouth every 8 (eight) hours as needed for Pain.    ascorbic acid, vitamin C, (VITAMIN C) 500 MG tablet Take 500 mg by mouth once daily.    aspirin (ECOTRIN) 81 MG EC tablet Take 81 mg by mouth once daily.    BD ULTRA-FINE ADITYA PEN NEEDLE 32 gauge x 5/32" Ndle USE FOUR TIMES DAILY WITH MEALS AND NIGHTLY    blood sugar diagnostic (CONTOUR TEST STRIPS) Strp Inject 1 strip into the skin 2 (two) times daily before meals.    gabapentin (NEURONTIN) 100 MG capsule Take 2 capsules (200 mg total) by mouth 2 (two) times daily. (Patient taking differently: Take 100 mg by mouth once daily.)    hydrALAZINE (APRESOLINE) 25 MG tablet Take 1 tablet (25 mg total) by mouth 3 (three) times daily.    insulin aspart U-100 (NOVOLOG) 100 unit/mL " (3 mL) InPn pen Inject 0-5 Units into the skin before meals and at bedtime as needed (Hyperglycemia). **LOW CORRECTION DOSE**  Blood Glucose  mg/dL                  Pre-meal                2200  151-200                0 unit                      0 unit  201-250                2 units                    1 unit  251-300                3 units                    1 unit  301-350                4 units                    2 units  >350                     5 units                    3 units  Administer subcutaneously if needed at times designated by monitoring schedule.    insulin aspart U-100 (NOVOLOG) 100 unit/mL (3 mL) InPn pen Inject 20 Units into the skin 3 (three) times daily with meals.    isosorbide dinitrate (ISORDIL) 10 MG tablet Take 1 tablet (10 mg total) by mouth 3 (three) times daily.    Lactobacillus rhamnosus GG (CULTURELLE) 10 billion cell capsule Take 1 capsule by mouth 2 (two) times daily.    LANTUS SOLOSTAR U-100 INSULIN glargine 100 units/mL SubQ pen Inject 45 Units into the skin every evening.    MICROLET LANCET Misc     NIFEdipine (PROCARDIA-XL) 90 MG (OSM) 24 hr tablet Take 1 tablet (90 mg total) by mouth once daily.    rosuvastatin (CRESTOR) 40 MG Tab Take 40 mg by mouth once daily.    tamsulosin (FLOMAX) 0.4 mg Cap Take 1 capsule (0.4 mg total) by mouth once daily.    [DISCONTINUED] hydroCHLOROthiazide (HYDRODIURIL) 25 MG tablet Take 0.5 tablets (12.5 mg total) by mouth every Mon, Wed, Fri. Beginning on 7/4/2022     Family History       Problem Relation (Age of Onset)    Cancer Brother    Diabetes Mother, Sister    Heart disease Mother    Stroke Sister          Tobacco Use    Smoking status: Never    Smokeless tobacco: Never   Substance and Sexual Activity    Alcohol use: No     Comment: occassional    Drug use: No    Sexual activity: Not Currently     Review of Systems   Constitutional:  Negative for appetite change, chills, fatigue, fever and unexpected weight change.   HENT:  Negative for  congestion, rhinorrhea, sore throat and trouble swallowing.    Eyes:  Negative for photophobia and visual disturbance.   Respiratory:  Positive for cough (non productive). Negative for shortness of breath and wheezing.         +mild RICCI   Cardiovascular:  Positive for chest pain and leg swelling (chronic). Negative for palpitations.   Gastrointestinal:  Negative for abdominal distention, abdominal pain, diarrhea, nausea and vomiting.   Genitourinary:  Negative for dysuria, flank pain, frequency and hematuria.   Musculoskeletal:  Negative for arthralgias, back pain, gait problem and myalgias.   Skin:  Positive for wound (BLE). Negative for color change, pallor and rash.   Neurological:  Negative for dizziness, speech difficulty, weakness, light-headedness and numbness.   Psychiatric/Behavioral:  Negative for agitation, confusion and suicidal ideas. The patient is not nervous/anxious.    Objective:     Vital Signs (Most Recent):  Temp: 98.7 °F (37.1 °C) (09/17/22 1939)  Pulse: (!) 53 (09/17/22 1939)  Resp: 16 (09/17/22 1939)  BP: (!) 165/73 (09/17/22 1702)  SpO2: 95 % (09/17/22 1939)   Vital Signs (24h Range):  Temp:  [98.7 °F (37.1 °C)-98.9 °F (37.2 °C)] 98.7 °F (37.1 °C)  Pulse:  [52-71] 53  Resp:  [16-20] 16  SpO2:  [95 %-97 %] 95 %  BP: (145-165)/(64-73) 165/73     Weight: 124.3 kg (274 lb)  Body mass index is 40.46 kg/m².    Physical Exam  Vitals and nursing note reviewed.   Constitutional:       General: He is not in acute distress.     Appearance: He is obese. He is not toxic-appearing or diaphoretic.   HENT:      Head: Normocephalic and atraumatic.      Nose: Nose normal.      Mouth/Throat:      Mouth: Mucous membranes are moist.   Eyes:      Pupils: Pupils are equal, round, and reactive to light.   Cardiovascular:      Rate and Rhythm: Normal rate and regular rhythm.   Pulmonary:      Effort: Pulmonary effort is normal. No respiratory distress.      Breath sounds: No wheezing, rhonchi or rales.   Abdominal:       General: Bowel sounds are normal. There is no distension.      Palpations: Abdomen is soft.      Tenderness: There is no abdominal tenderness. There is no guarding.   Musculoskeletal:         General: Deformity (L foot amputation) present. Normal range of motion.      Cervical back: Normal range of motion.      Right lower leg: Edema present.      Left lower leg: Edema present.   Skin:     General: Skin is warm and dry.      Capillary Refill: Capillary refill takes 2 to 3 seconds.      Comments: BLE covered with dressings, CDI   Neurological:      General: No focal deficit present.      Mental Status: He is alert and oriented to person, place, and time.      Sensory: Sensory deficit present.      Motor: No weakness.   Psychiatric:         Mood and Affect: Mood normal.         Behavior: Behavior normal.         CRANIAL NERVES     CN III, IV, VI   Pupils are equal, round, and reactive to light.     Significant Labs: All pertinent labs within the past 24 hours have been reviewed.  CBC:   Recent Labs   Lab 09/17/22  1609   WBC 3.58*   HGB 13.8*   HCT 44.7        CMP:   Recent Labs   Lab 09/17/22  1609      K 3.0*   CL 98   CO2 31*   *   BUN 15   CREATININE 1.4   CALCIUM 8.6*   PROT 7.4   ALBUMIN 2.8*   BILITOT 0.4   ALKPHOS 79   AST 12   ALT 6*   ANIONGAP 11     Cardiac Markers:   Recent Labs   Lab 09/17/22  1609   BNP 63     Troponin:   Recent Labs   Lab 09/17/22  1609 09/17/22  1909   TROPONINI 0.018 0.010     TSH:   Recent Labs   Lab 09/17/22  1609   TSH 1.172       Significant Imaging: I have reviewed all pertinent imaging results/findings within the past 24 hours.    Imaging Results              CTA Chest Non-Coronary (PE Studies) (In process)  Result time 09/17/22 21:42:51                     X-Ray Chest AP Portable (Final result)  Result time 09/17/22 17:01:28      Final result by Milton Dove MD (09/17/22 17:01:28)                   Impression:      1. Pulmonary findings are likely  the result of shallow inspiratory effort and habitus, no large focal consolidation.      Electronically signed by: Milton Dove MD  Date:    09/17/2022  Time:    17:01               Narrative:    EXAMINATION:  XR CHEST AP PORTABLE    CLINICAL HISTORY:  Chest pain, unspecified    TECHNIQUE:  Single frontal view of the chest was performed.    COMPARISON:  06/24/2022    FINDINGS:  The cardiomediastinal silhouette is prominent, magnified by technique, similar to the previous examination.  There is elevation of the right hemidiaphragm..  There is no pleural effusion.  The trachea is midline.  The lungs are symmetrically expanded bilaterally with mildly coarse central hilar interstitial attenuation accentuated by shallow inspiratory effort.  There is bilateral basilar subsegmental atelectasis..  No large focal consolidation seen.  There is no pneumothorax.  The osseous structures are remarkable for degenerative changes..

## 2022-09-18 NOTE — PROGRESS NOTES
"[facility] ([address] Medicine  Progress Note    Patient Name: Saji Castañeda  MRN: [id number]  Patient Class: OP- Observation   Admission Date: 9/17/2022  Length of Stay: 0 days  Attending Physician: Merlin Kearney MD  Primary Care Provider: Mart Escalante MD        Subjective:     Principal Problem:Chest pain        HPI:  Saji Castañeda is a 72 y.o. male with a PMHx of DM2, PVD, HLD, CKD, Chronic L ankle/foot osteomyelitis, and RUE DVT who presents to the ED with complaints of chest pain. The patient reports he developed localized "stabbing" left lower sternal chest pain today around 9am while sitting in his wheelchair watching TV. Pain has been waxing and waning. Denies any aggravating or alleviating factors. Currently undergoing wound care treatments for BLE wounds. Last dressings were changed 2 days ago. No recent medication changes. The patient does endorse mild RICCI and non productive cough. Denies associated fever, SOB, abdominal pain, nausea, vomiting, urinary or bowel movement changes.    In the ED, patient noted to be hypertensive, otherwise VSSAF. WBC 3.58k. Potassium 3.0. Glucose 127. D dimer 1.55. TSH WNL. BNP 63. Troponin 0.018>0.010. EKG shows sinus rhythm, rate of 54 with 2nd degree Mobitz type 1, similar to previous. CXR with pulmonary findings are likely the result of shallow inspiratory effort and habitus, no large focal consolidation. CTA chest in process. The patient received ASA and potassium replacement.      Overview/Hospital Course:  No notes on file    No new subjective & objective note has been filed under this hospital service since the last note was generated.      Assessment/Plan:      * Chest pain  -Received ASA in the ED, continue daily.   -Troponin 0.018>0.010, continue to trend.  -Reviewed EKG.  -Continuous telemetry monitoring.  -D dimer 1.55  -PRN EKG for chest pain  - TTE  findings 9/18   1 - Normal left ventricular systolic function (EF 60-65%). "     2 - Normal left ventricular diastolic function.     3 - Normal right ventricular systolic function .     4 - Trivial aortic regurgitation.     5 - Trivial tricuspid regurgitation.     6 - The estimated PA systolic pressure is 26 mmHg.     7 - Mild left atrial enlargement.      - CTA:   Artifact limited study with suboptimal opacification of the pulmonary arteries, however there is no evidence of large pulmonary thromboembolism.  Further evaluation/follow-up as clinically warranted.Minimal patchy ground-glass opacities mainly in the left lower lobe could represent early infectious/inflammatory change.Dilatation of pulmonary trunk, which can represent pulmonary artery hypertension.Mediastinal lymphadenopathy, stable from prior.    - Keep K >4and Mg >2         Acute deep vein thrombosis (DVT) of right upper extremity  -Diagnosed during hospitalization in June.  -Continue eliquis.    Morbid obesity  Body mass index is 40.46 kg/m². Morbid obesity complicates all aspects of disease management from diagnostic modalities to treatment. Weight loss encouraged and health benefits explained to patient.     Hypokalemia  Patient has hypokalemia which is currently uncontrolled. Last electrolytes reviewed-   Recent Labs   Lab 09/17/22  1609 09/18/22  0240   K 3.0* 3.1*   . Will replace potassium and monitor electrolytes closely. Continuous telemetry.    Osteomyelitis of left lower extremity  Pt has a PMHx left midtarsal amputation 2010, left calcaneal osteomyelitis s/p treatment in the past who was admitted in June for infected left heel ulceration. Tib/fib xray of the left leg notable for osteomyelitis of the posterior calcaneus. Vascular studies were concerning for left lower leg arterial stenosis and calf vessel narrowing/occulsions. Wound culture returned positive for GBS, MRSA, prevotella and porphromonas. Bone culture grew Staph epidermidis. Bone never sent for pathology. Vascular surgery recommended AKA vs BKA. Patient  decided to attempt abx and deferred surgery.  He was discharged to [facility] on Daptomycin x 6 weeks and PO Flagyl x 14 days. Daptomycin was extended for 2 additional weeks and cefepime added for 2 weeks.  -Follows outpatient with podiatry and ID.  -Wound care consult.    Peripheral arterial disease  -Continue home statin and ASA    Asymptomatic Mobitz (type) I (Wenckebach's) atrioventricular block  -Chronic issue.  -Avoid BB or CCB.    Anemia of chronic disease  Patient's anemia is currently controlled. S/p 0 units of PRBCs.   Current CBC reviewed-   Lab Results   Component Value Date    HGB 13.8 (L) 09/17/2022    HCT 44.7 09/17/2022     Monitor serial CBC and transfuse if patient becomes hemodynamically unstable, symptomatic or H/H drops below 7/21.     Chronic kidney disease, stage III (moderate)  Creatine stable for now. BMP reviewed- noted Estimated Creatinine Clearance: 61.2 mL/min (based on SCr of 1.4 mg/dL). according to latest data. Monitor UOP and serial BMP and adjust therapy as needed. Renally dose meds.    Type 2 diabetes, uncontrolled, with neuropathy  Patient's FSGs are uncontrolled due to hyperglycemia on current medication regimen.  Last A1c reviewed-   Lab Results   Component Value Date    HGBA1C 9.9 (H) 09/17/2022     Most recent fingerstick glucose reviewed- No results for input(s): POCTGLUCOSE in the last 24 hours.  Current correctional scale  Low  Maintain anti-hyperglycemic dose as follows-   Antihyperglycemics (From admission, onward)    Start     Stop Route Frequency Ordered    09/18/22 0715  insulin aspart U-100 pen 12 Units         -- SubQ 3 times daily with meals 09/17/22 2152 09/17/22 2300  insulin detemir U-100 pen 40 Units         -- SubQ Nightly 09/17/22 2235    09/17/22 2252  insulin aspart U-100 pen 0-5 Units         -- SubQ Before meals & nightly PRN 09/17/22 2152        Hold Oral hypoglycemics while patient is in the hospital.  -Accuchecks AC/HS  -Diabetic/cardiac  diet    Essential hypertension  -Hypertensive in the ED, received home medications.  -Continue hydralazine, isodil, losartan, and nifedipine.  -Monitor BP closely.      VTE Risk Mitigation (From admission, onward)         Ordered     apixaban tablet 5 mg  2 times daily         09/18/22 0121     IP VTE HIGH RISK PATIENT  Once         09/18/22 0108     Place sequential compression device  Until discontinued         09/18/22 0108                Discharge Planning   OXANA:      Code Status: Full Code   Is the patient medically ready for discharge?:     Reason for patient still in hospital (select all that apply): Patient trending condition                     Merlin Kearney MD  Department of Hospital Medicine   [facility] - Intensive Care ([place]-16)

## 2022-09-19 PROBLEM — I21.4 NSTEMI (NON-ST ELEVATED MYOCARDIAL INFARCTION): Status: ACTIVE | Noted: 2022-09-19

## 2022-09-19 LAB
ALBUMIN SERPL BCP-MCNC: 2.4 G/DL (ref 3.5–5.2)
ALP SERPL-CCNC: 71 U/L (ref 55–135)
ALT SERPL W/O P-5'-P-CCNC: 8 U/L (ref 10–44)
ANION GAP SERPL CALC-SCNC: 5 MMOL/L (ref 8–16)
AST SERPL-CCNC: 12 U/L (ref 10–40)
BACTERIA UR CULT: NORMAL
BACTERIA UR CULT: NORMAL
BASOPHILS # BLD AUTO: 0.02 K/UL (ref 0–0.2)
BASOPHILS NFR BLD: 0.4 % (ref 0–1.9)
BILIRUB SERPL-MCNC: 0.4 MG/DL (ref 0.1–1)
BUN SERPL-MCNC: 15 MG/DL (ref 8–23)
CALCIUM SERPL-MCNC: 8.5 MG/DL (ref 8.7–10.5)
CHLORIDE SERPL-SCNC: 99 MMOL/L (ref 95–110)
CHOLEST SERPL-MCNC: 91 MG/DL (ref 120–199)
CHOLEST/HDLC SERPL: 2.3 {RATIO} (ref 2–5)
CO2 SERPL-SCNC: 29 MMOL/L (ref 23–29)
CREAT SERPL-MCNC: 1.2 MG/DL (ref 0.5–1.4)
DIFFERENTIAL METHOD: ABNORMAL
EOSINOPHIL # BLD AUTO: 0.3 K/UL (ref 0–0.5)
EOSINOPHIL NFR BLD: 6.5 % (ref 0–8)
ERYTHROCYTE [DISTWIDTH] IN BLOOD BY AUTOMATED COUNT: 17.4 % (ref 11.5–14.5)
EST. GFR  (NO RACE VARIABLE): >60 ML/MIN/1.73 M^2
FACT X PPP CHRO-ACNC: 0.58 IU/ML (ref 0.3–0.7)
FACT X PPP CHRO-ACNC: 0.61 IU/ML (ref 0.3–0.7)
FACT X PPP CHRO-ACNC: 0.61 IU/ML (ref 0.3–0.7)
FACT X PPP CHRO-ACNC: 0.62 IU/ML (ref 0.3–0.7)
GLUCOSE SERPL-MCNC: 237 MG/DL (ref 70–110)
HCT VFR BLD AUTO: 28.3 % (ref 40–54)
HDLC SERPL-MCNC: 40 MG/DL (ref 40–75)
HDLC SERPL: 44 % (ref 20–50)
HGB BLD-MCNC: 8.9 G/DL (ref 14–18)
IMM GRANULOCYTES # BLD AUTO: 0.01 K/UL (ref 0–0.04)
IMM GRANULOCYTES NFR BLD AUTO: 0.2 % (ref 0–0.5)
LDLC SERPL CALC-MCNC: 37.4 MG/DL (ref 63–159)
LYMPHOCYTES # BLD AUTO: 1.4 K/UL (ref 1–4.8)
LYMPHOCYTES NFR BLD: 26.9 % (ref 18–48)
MAGNESIUM SERPL-MCNC: 1.8 MG/DL (ref 1.6–2.6)
MCH RBC QN AUTO: 25.9 PG (ref 27–31)
MCHC RBC AUTO-ENTMCNC: 31.4 G/DL (ref 32–36)
MCV RBC AUTO: 83 FL (ref 82–98)
MONOCYTES # BLD AUTO: 0.5 K/UL (ref 0.3–1)
MONOCYTES NFR BLD: 10.1 % (ref 4–15)
NEUTROPHILS # BLD AUTO: 2.9 K/UL (ref 1.8–7.7)
NEUTROPHILS NFR BLD: 55.9 % (ref 38–73)
NONHDLC SERPL-MCNC: 51 MG/DL
NRBC BLD-RTO: 0 /100 WBC
PLATELET # BLD AUTO: 289 K/UL (ref 150–450)
PMV BLD AUTO: 9.7 FL (ref 9.2–12.9)
POCT GLUCOSE: 136 MG/DL (ref 70–110)
POCT GLUCOSE: 152 MG/DL (ref 70–110)
POCT GLUCOSE: 233 MG/DL (ref 70–110)
POCT GLUCOSE: 261 MG/DL (ref 70–110)
POTASSIUM SERPL-SCNC: 4.4 MMOL/L (ref 3.5–5.1)
PROT SERPL-MCNC: 6.7 G/DL (ref 6–8.4)
RBC # BLD AUTO: 3.43 M/UL (ref 4.6–6.2)
SODIUM SERPL-SCNC: 133 MMOL/L (ref 136–145)
TRIGL SERPL-MCNC: 68 MG/DL (ref 30–150)
TROPONIN I SERPL DL<=0.01 NG/ML-MCNC: 0.01 NG/ML (ref 0–0.03)
TSH SERPL DL<=0.005 MIU/L-ACNC: 2.48 UIU/ML (ref 0.4–4)
WBC # BLD AUTO: 5.24 K/UL (ref 3.9–12.7)

## 2022-09-19 PROCEDURE — 63600175 PHARM REV CODE 636 W HCPCS: Performed by: NURSE PRACTITIONER

## 2022-09-19 PROCEDURE — 25000003 PHARM REV CODE 250: Performed by: NURSE PRACTITIONER

## 2022-09-19 PROCEDURE — 80053 COMPREHEN METABOLIC PANEL: CPT | Performed by: NURSE PRACTITIONER

## 2022-09-19 PROCEDURE — 99225 PR SUBSEQUENT OBSERVATION CARE,LEVEL II: CPT | Mod: ,,, | Performed by: STUDENT IN AN ORGANIZED HEALTH CARE EDUCATION/TRAINING PROGRAM

## 2022-09-19 PROCEDURE — 84484 ASSAY OF TROPONIN QUANT: CPT | Performed by: STUDENT IN AN ORGANIZED HEALTH CARE EDUCATION/TRAINING PROGRAM

## 2022-09-19 PROCEDURE — 85025 COMPLETE CBC W/AUTO DIFF WBC: CPT | Performed by: STUDENT IN AN ORGANIZED HEALTH CARE EDUCATION/TRAINING PROGRAM

## 2022-09-19 PROCEDURE — 96366 THER/PROPH/DIAG IV INF ADDON: CPT

## 2022-09-19 PROCEDURE — 80061 LIPID PANEL: CPT | Performed by: STUDENT IN AN ORGANIZED HEALTH CARE EDUCATION/TRAINING PROGRAM

## 2022-09-19 PROCEDURE — 96372 THER/PROPH/DIAG INJ SC/IM: CPT | Performed by: NURSE PRACTITIONER

## 2022-09-19 PROCEDURE — 25000003 PHARM REV CODE 250: Performed by: HOSPITALIST

## 2022-09-19 PROCEDURE — 83735 ASSAY OF MAGNESIUM: CPT | Performed by: NURSE PRACTITIONER

## 2022-09-19 PROCEDURE — 85520 HEPARIN ASSAY: CPT | Mod: 91 | Performed by: STUDENT IN AN ORGANIZED HEALTH CARE EDUCATION/TRAINING PROGRAM

## 2022-09-19 PROCEDURE — G0378 HOSPITAL OBSERVATION PER HR: HCPCS

## 2022-09-19 PROCEDURE — 25000003 PHARM REV CODE 250: Performed by: STUDENT IN AN ORGANIZED HEALTH CARE EDUCATION/TRAINING PROGRAM

## 2022-09-19 PROCEDURE — 99214 OFFICE O/P EST MOD 30 MIN: CPT | Mod: ,,, | Performed by: INTERNAL MEDICINE

## 2022-09-19 PROCEDURE — 99225 PR SUBSEQUENT OBSERVATION CARE,LEVEL II: ICD-10-PCS | Mod: ,,, | Performed by: STUDENT IN AN ORGANIZED HEALTH CARE EDUCATION/TRAINING PROGRAM

## 2022-09-19 PROCEDURE — 99214 PR OFFICE/OUTPT VISIT, EST, LEVL IV, 30-39 MIN: ICD-10-PCS | Mod: ,,, | Performed by: INTERNAL MEDICINE

## 2022-09-19 PROCEDURE — 36415 COLL VENOUS BLD VENIPUNCTURE: CPT | Performed by: STUDENT IN AN ORGANIZED HEALTH CARE EDUCATION/TRAINING PROGRAM

## 2022-09-19 PROCEDURE — 85520 HEPARIN ASSAY: CPT | Performed by: STUDENT IN AN ORGANIZED HEALTH CARE EDUCATION/TRAINING PROGRAM

## 2022-09-19 PROCEDURE — 84443 ASSAY THYROID STIM HORMONE: CPT | Performed by: STUDENT IN AN ORGANIZED HEALTH CARE EDUCATION/TRAINING PROGRAM

## 2022-09-19 RX ORDER — HYDRALAZINE HYDROCHLORIDE 50 MG/1
50 TABLET, FILM COATED ORAL 3 TIMES DAILY
Status: DISCONTINUED | OUTPATIENT
Start: 2022-09-20 | End: 2022-09-20 | Stop reason: HOSPADM

## 2022-09-19 RX ORDER — ASPIRIN 81 MG/1
81 TABLET ORAL DAILY
Qty: 7 TABLET | Refills: 0 | Status: SHIPPED | OUTPATIENT
Start: 2022-09-19 | End: 2023-03-09 | Stop reason: SDUPTHER

## 2022-09-19 RX ORDER — ISOSORBIDE DINITRATE 20 MG/1
20 TABLET ORAL 3 TIMES DAILY
Status: DISCONTINUED | OUTPATIENT
Start: 2022-09-20 | End: 2022-09-20 | Stop reason: HOSPADM

## 2022-09-19 RX ORDER — CLOPIDOGREL BISULFATE 75 MG/1
75 TABLET ORAL DAILY
Qty: 60 TABLET | Refills: 0 | Status: ON HOLD | OUTPATIENT
Start: 2022-09-20 | End: 2023-10-24

## 2022-09-19 RX ADMIN — ASPIRIN 81 MG: 81 TABLET, COATED ORAL at 09:09

## 2022-09-19 RX ADMIN — GABAPENTIN 100 MG: 100 CAPSULE ORAL at 09:09

## 2022-09-19 RX ADMIN — TAMSULOSIN HYDROCHLORIDE 0.4 MG: 0.4 CAPSULE ORAL at 09:09

## 2022-09-19 RX ADMIN — ISOSORBIDE DINITRATE 10 MG: 10 TABLET ORAL at 03:09

## 2022-09-19 RX ADMIN — Medication 1 CAPSULE: at 09:09

## 2022-09-19 RX ADMIN — Medication 1 CAPSULE: at 08:09

## 2022-09-19 RX ADMIN — OXYCODONE HYDROCHLORIDE AND ACETAMINOPHEN 500 MG: 500 TABLET ORAL at 09:09

## 2022-09-19 RX ADMIN — INSULIN ASPART 12 UNITS: 100 INJECTION, SOLUTION INTRAVENOUS; SUBCUTANEOUS at 01:09

## 2022-09-19 RX ADMIN — CEFTRIAXONE 1 G: 1 INJECTION, SOLUTION INTRAVENOUS at 09:09

## 2022-09-19 RX ADMIN — HYDRALAZINE HYDROCHLORIDE 25 MG: 25 TABLET, FILM COATED ORAL at 03:09

## 2022-09-19 RX ADMIN — ATORVASTATIN CALCIUM 80 MG: 20 TABLET, FILM COATED ORAL at 09:09

## 2022-09-19 RX ADMIN — INSULIN DETEMIR 35 UNITS: 100 INJECTION, SOLUTION SUBCUTANEOUS at 10:09

## 2022-09-19 RX ADMIN — HYDRALAZINE HYDROCHLORIDE 25 MG: 25 TABLET, FILM COATED ORAL at 08:09

## 2022-09-19 RX ADMIN — NITROGLYCERIN 2 INCH: 20 OINTMENT TOPICAL at 10:09

## 2022-09-19 RX ADMIN — LOSARTAN POTASSIUM 100 MG: 50 TABLET, FILM COATED ORAL at 09:09

## 2022-09-19 RX ADMIN — INSULIN ASPART 1 UNITS: 100 INJECTION, SOLUTION INTRAVENOUS; SUBCUTANEOUS at 10:09

## 2022-09-19 RX ADMIN — ISOSORBIDE DINITRATE 10 MG: 10 TABLET ORAL at 09:09

## 2022-09-19 RX ADMIN — ISOSORBIDE DINITRATE 10 MG: 10 TABLET ORAL at 08:09

## 2022-09-19 RX ADMIN — HYDRALAZINE HYDROCHLORIDE 25 MG: 25 TABLET, FILM COATED ORAL at 09:09

## 2022-09-19 RX ADMIN — CLOPIDOGREL 75 MG: 75 TABLET, FILM COATED ORAL at 09:09

## 2022-09-19 RX ADMIN — INSULIN ASPART 12 UNITS: 100 INJECTION, SOLUTION INTRAVENOUS; SUBCUTANEOUS at 09:09

## 2022-09-19 NOTE — NURSING
Received new order from Dr. Ny to hold heparin drip until Dr. thomson clarify dual anticoagulation.

## 2022-09-19 NOTE — CONSULTS
"[facility] [place]  Consultation  Interventional Cardiology      Saji Castañeda  YOB: 1950  Medical Record Number:  [id number]  Attending Physician:  Merlin Kearney MD   Date of Admission: 9/17/2022       Hospital Day:  0  Current Principal Problem:  Chest pain      History     Cc:  Chest pain    HPI  Saji Castañeda is a 72 y.o. male with a PMHx of DM2, PVD, HLD, CKD, Chronic L ankle/foot osteomyelitis, and RUE DVT who presented to the ED with complaints of chest pain. The patient reports he developed substernal nonradiating "stabbing" chest pain the day prior to admission while sitting in his wheelchair watching TV.  Patient reports continuous chest pain from Friday afternoon throughout Saturday until he came to the hospital.  Denies any associated symptoms such as shortness of breath, arm pain, back pain, neck discomfort, nausea, vomiting, palpitations, lightheaded, dizziness, or syncope.  He has never felt this pain before.  He is currently chest pain-free and on ACS protocol.  He is also being treated for osteomyelitis of left lower extremity.  Troponins 0.026 ->.044 -> .007.  BNP-63.  EKG notable for type 1 Mobitz AV block otherwise no ST changes concerning for ischemia or infarctions.  Echo with EF of 60% and otherwise unremarkable.  Interventional Cardiology consult for NSTEMI.    Medications - Outpatient  Prior to Admission medications    Medication Sig Start Date End Date Taking? Authorizing Provider   acetaminophen (TYLENOL) 500 MG tablet Take 1,000 mg by mouth every 8 (eight) hours as needed for Pain.    Historical Provider   ascorbic acid, vitamin C, (VITAMIN C) 500 MG tablet Take 500 mg by mouth once daily.    Historical Provider   aspirin (ECOTRIN) 81 MG EC tablet Take 81 mg by mouth once daily.    Historical Provider   BD ULTRA-FINE ADITYA PEN NEEDLE 32 gauge x 5/32" Ndle USE FOUR TIMES DAILY WITH MEALS AND NIGHTLY 11/20/19   Miriam Duong NP   blood sugar diagnostic " (CONTOUR TEST STRIPS) Strp Inject 1 strip into the skin 2 (two) times daily before meals. 5/27/16   Rachel Smith, APRN, FNP   gabapentin (NEURONTIN) 100 MG capsule Take 2 capsules (200 mg total) by mouth 2 (two) times daily.  Patient taking differently: Take 100 mg by mouth once daily. 12/14/20 6/20/22  Kun Shook MD   hydrALAZINE (APRESOLINE) 25 MG tablet Take 1 tablet (25 mg total) by mouth 3 (three) times daily. 7/8/22 7/8/23  Salma Strauss MD   insulin aspart U-100 (NOVOLOG) 100 unit/mL (3 mL) InPn pen Inject 0-5 Units into the skin before meals and at bedtime as needed (Hyperglycemia). **LOW CORRECTION DOSE**  Blood Glucose  mg/dL                  Pre-meal                2200  151-200                0 unit                      0 unit  201-250                2 units                    1 unit  251-300                3 units                    1 unit  301-350                4 units                    2 units  >350                     5 units                    3 units  Administer subcutaneously if needed at times designated by monitoring schedule. 6/24/22 6/24/23  Salma Strauss MD   insulin aspart U-100 (NOVOLOG) 100 unit/mL (3 mL) InPn pen Inject 20 Units into the skin 3 (three) times daily with meals. 6/27/22 6/27/23  Salma Strauss MD   isosorbide dinitrate (ISORDIL) 10 MG tablet Take 1 tablet (10 mg total) by mouth 3 (three) times daily. 7/8/22 7/8/23  Salma Strauss MD   Lactobacillus rhamnosus GG (CULTURELLE) 10 billion cell capsule Take 1 capsule by mouth 2 (two) times daily. 6/24/22   Salma Strauss MD   LANTUS SOLOSTAR U-100 INSULIN glargine 100 units/mL SubQ pen Inject 45 Units into the skin every evening. 7/8/22   Salma Strauss MD   MICROLET LANCET Misc  4/18/14   Historical Provider   NIFEdipine (PROCARDIA-XL) 90 MG (OSM) 24 hr tablet Take 1 tablet (90 mg total) by mouth once daily. 6/25/22 6/25/23  Salma Strauss MD   rosuvastatin (CRESTOR) 40 MG Tab Take 40 mg by  mouth once daily. 4/9/21   Historical Provider   tamsulosin (FLOMAX) 0.4 mg Cap Take 1 capsule (0.4 mg total) by mouth once daily. 6/20/22 12/17/22  Salma Strauss MD   hydroCHLOROthiazide (HYDRODIURIL) 25 MG tablet Take 0.5 tablets (12.5 mg total) by mouth every Mon, Wed, Fri. Beginning on 7/4/2022 7/4/22 7/8/22  Keerthi Mayorga MD         Medications - Current  Scheduled Meds:   ascorbic acid (vitamin C)  500 mg Oral Daily    aspirin  81 mg Oral Daily    atorvastatin  80 mg Oral Daily    cefTRIAXone (ROCEPHIN) IVPB  1 g Intravenous Q24H    clopidogreL  75 mg Oral Daily    gabapentin  100 mg Oral Daily    hydrALAZINE  25 mg Oral TID    insulin aspart U-100  12 Units Subcutaneous TIDWM    insulin detemir U-100  35 Units Subcutaneous QHS    isosorbide dinitrate  10 mg Oral TID    Lactobacillus rhamnosus GG  1 capsule Oral BID    losartan  100 mg Oral Daily    tamsulosin  0.4 mg Oral Daily     Continuous Infusions:   heparin (porcine) in D5W Stopped (09/19/22 0700)     PRN Meds:.acetaminophen, dextrose 10%, dextrose 10%, glucagon (human recombinant), glucose, glucose, heparin (PORCINE), heparin (PORCINE), insulin aspart U-100, melatonin, prochlorperazine, sodium chloride 0.9%      Allergies  Review of patient's allergies indicates:  No Known Allergies      Past Medical History  Past Medical History:   Diagnosis Date    Arthritis     legs    Diabetes mellitus     Diabetes mellitus, type 2     Hyperlipidemia     Osteomyelitis          Past Surgical History  Past Surgical History:   Procedure Laterality Date    ANGIOGRAPHY OF LOWER EXTREMITY N/A 2/3/2021    Procedure: Angiogram Extremity Bilateral;  Surgeon: Ernst Chacko MD;  Location: [facility] OR [address] [place] [address];  Service: Peripheral Vascular;  Laterality: N/A;  7.4 mintues fluoroscopy time  816.15 mGy  170.17 Gycm2    AORTOGRAPHY WITH EXTREMITY RUNOFF Bilateral 2/3/2021    Procedure: AORTOGRAM, WITH EXTREMITY RUNOFF;  Surgeon: Ernst Chacko MD;  Location: [facility] OR  2ND FLR;  Service: Peripheral Vascular;  Laterality: Bilateral;    DEBRIDEMENT OF FOOT Left 2/23/2021    Procedure: DEBRIDEMENT, LEFT HEEL;  Surgeon: Mayra Schroeder DPM;  Location: [facility] OR 1ST FLR;  Service: Podiatry;  Laterality: Left;    FOOT AMPUTATION  October 2010    left high midfoot amputation         Social History  Social History     Socioeconomic History    Marital status: Single   Tobacco Use    Smoking status: Never    Smokeless tobacco: Never   Substance and Sexual Activity    Alcohol use: No     Comment: occassional    Drug use: No    Sexual activity: Not Currently   Social History Narrative    Not currently working; lives with family         ROS   Admits Denies   Constitutional  Chills, diaphoresis, malaise   Eyes  Visual changes   ENMT  Dysphagia, Epistaxis, nasal congestion, hearing loss   Cardiovascular  Chest pain, palpitations, edema   Respiratory  Cough, dyspnea, wheezing   Gastrointestinal  Nausea, vomiting, constipation, diarrhea, anorexia.     Genitourinary  Dysuria, incontinence   Musculoskeletal  Myalgias, joint pain, joint swelling   Integumentary  Rash, inflammation, burning   Neruo-Psychiatric  Anxiety, insomnia.  Changes in speech, strength, sensation.     Endocrine     Hematologic  Abnormal bruising, bleeding   Immunologic  Inflammation, pain at IV sites.  Pruritis.         Physical Examination         Vital Signs  Vitals  Temp: 98.1 °F (36.7 °C)  Temp src: Oral  Pulse: (!) 47  Heart Rate Source: Monitor  Resp: 20  SpO2: 97 %  Pulse Oximetry Type: Intermittent  O2 Device (Oxygen Therapy): room air  BP: (!) 172/74  MAP (mmHg): 102  BP Location: Left arm  BP Method: Manual  Patient Position: Lying            24 Hour VS Range    Temp:  [98.1 °F (36.7 °C)-99.8 °F (37.7 °C)]   Pulse:  [47-78]   Resp:  [18-20]   BP: (130-179)/(62-78)   SpO2:  [92 %-97 %]     Intake/Output Summary (Last 24 hours) at 9/19/2022 0800  Last data filed at 9/19/2022 0400  Gross per 24 hour   Intake 240 ml    Output 1100 ml   Net -860 ml         General:  Lying in bed no acute distress  Head: NCAT  Eyes: conjunctivae and lids normal, no scleral icterus, EOMI.  ENMT:  no gingival bleeding, normal oral mucosa without pallor or cyanosis.   Neck:  JVP normal.  Trachea non-displaced.     Chest:  Normal respiratory effort.  Chest clear to auscultation.  No wheezes, rales, or rhonchi.  Heart:  Normal PMI, S1 S2 normal quality, splitting.  No murmurs rubs or gallops.  Peripheral pulses 2+ bilateral carotids.  1+ bilateral radials.  Left foot amputation.  Abdomen:  Non-distended, normal bowel sounds, non-tender.  No hepato-jugular reflux.  Extremities:  Left lower extremity wrapped with dressings clean and dry.    Skin:  Warm and dry.  No cyanosis or pallor.  No ulcers, stasis.  IV sites without tenderness or inflammation.    Neurological / Psychiatric:  Oriented to person, time, and place.  No facial asymmetry, drift.  Fluent without dysarthria.  Mood euthymic, affect normal.       Data       Recent Labs   Lab 09/17/22  1609 09/18/22  0240 09/18/22  1153 09/18/22  1538   WBC 3.58* 7.40 5.63 5.96   HGB 13.8* 9.3* 9.6* 9.6*   HCT 44.7 28.4* 30.4* 30.2*    332 316 291        Recent Labs   Lab 09/18/22  1538   INR 1.1        Recent Labs   Lab 09/17/22  1609 09/18/22  0240    139   K 3.0* 3.1*   CL 98 98   CO2 31* 30*   BUN 15 14   CREATININE 1.4 1.1   ANIONGAP 11 11   CALCIUM 8.6* 8.9        Recent Labs   Lab 09/17/22  1609 09/18/22  0240   PROT 7.4 7.1   ALBUMIN 2.8* 2.6*   BILITOT 0.4 0.3   ALKPHOS 79 75   AST 12 12   ALT 6* 7*        Recent Labs   Lab 09/17/22  1909 09/17/22  2359 09/18/22  0240 09/18/22  0958 09/18/22  1153   TROPONINI 0.010 0.026 0.044* 0.039* 0.032*        BNP (pg/mL)   Date Value   09/17/2022 63   10/15/2018 292 (H)   07/17/2011 15     Assessment & Plan   Non ST elevated MI:  Patient is currently chest pain-free.  Troponins back down to normal.  No EKG changes concerning for ischemia or  infarctions.  Echocardiogram unremarkable.  -recommend medical management with aspirin, Plavix (triple therapy x1 week then stop aspirin then continue on Plavix and Eliquis)  -continue statin.  Cannot do beta-blocker given type 1 Mobitz  -patient may follow-up with me in the clinic  -if patient becomes symptomatic, please feel free to reach out to Interventional Cardiology for any questions or concerns.    -Will likely order stress test in outpatient setting  -recommend strict glycemic control    History of DVT right upper extremity  -continue Eliquis    Signed:  Adal Rios M.D.  Page # [phone]  Cardiovascular Fellow  [facility]         Staff      I have reviewed the notes, assessments, and/or procedures performed by Dr Rios, I concur with her/his documentation of Saji Castañeda.    Kodak Morris MD [facility]  Interventional Cardiology  Structural/Valvular heart disease  [phone]

## 2022-09-19 NOTE — PROGRESS NOTES
"[facility] ([address] Medicine  Progress Note    Patient Name: Saji Castañeda  MRN: [id number]  Patient Class: OP- Observation   Admission Date: 9/17/2022  Length of Stay: 0 days  Attending Physician: Merlin Kearney MD  Primary Care Provider: Mart Escalante MD        Subjective:     Principal Problem:NSTEMI (non-ST elevated myocardial infarction)        HPI:  Saji Castañeda is a 72 y.o. male with a PMHx of DM2, PVD, HLD, CKD, Chronic L ankle/foot osteomyelitis, and RUE DVT who presents to the ED with complaints of chest pain. The patient reports he developed localized "stabbing" left lower sternal chest pain today around 9am while sitting in his wheelchair watching TV. Pain has been waxing and waning. Denies any aggravating or alleviating factors. Currently undergoing wound care treatments for BLE wounds. Last dressings were changed 2 days ago. No recent medication changes. The patient does endorse mild RICCI and non productive cough. Denies associated fever, SOB, abdominal pain, nausea, vomiting, urinary or bowel movement changes.    In the ED, patient noted to be hypertensive, otherwise VSSAF. WBC 3.58k. Potassium 3.0. Glucose 127. D dimer 1.55. TSH WNL. BNP 63. Troponin 0.018>0.010. EKG shows sinus rhythm, rate of 54 with 2nd degree Mobitz type 1, similar to previous. CXR with pulmonary findings are likely the result of shallow inspiratory effort and habitus, no large focal consolidation. CTA chest in process. The patient received ASA and potassium replacement.      Overview/Hospital Course:  No notes on file    Interval History: Patient remains CP free. Denies SOB, n/v. Diaphoresis or lightheadedness.     Review of Systems   Constitutional:  Negative for appetite change, chills, fatigue, fever and unexpected weight change.   HENT:  Negative for congestion, rhinorrhea, sore throat and trouble swallowing.    Eyes:  Negative for photophobia and visual disturbance.   Respiratory:  Negative " for cough (non productive), shortness of breath and wheezing.         +mild RICCI   Cardiovascular:  Positive for leg swelling (chronic). Negative for palpitations.   Gastrointestinal:  Negative for abdominal distention, abdominal pain, diarrhea, nausea and vomiting.   Genitourinary:  Negative for dysuria, flank pain, frequency and hematuria.   Musculoskeletal:  Negative for arthralgias, back pain, gait problem and myalgias.   Skin:  Positive for wound (BLE). Negative for color change, pallor and rash.   Neurological:  Negative for dizziness, speech difficulty, weakness, light-headedness and numbness.   Psychiatric/Behavioral:  Negative for agitation, confusion and suicidal ideas. The patient is not nervous/anxious.    Objective:     Vital Signs (Most Recent):  Temp: 98.9 °F (37.2 °C) (09/19/22 1206)  Pulse: (!) 46 (09/19/22 1206)  Resp: 20 (09/19/22 1206)  BP: (!) 184/79 (09/19/22 1206)  SpO2: 97 % (09/19/22 1206)   Vital Signs (24h Range):  Temp:  [98.1 °F (36.7 °C)-99.8 °F (37.7 °C)] 98.9 °F (37.2 °C)  Pulse:  [46-78] 46  Resp:  [18-20] 20  SpO2:  [93 %-97 %] 97 %  BP: (130-184)/(62-79) 184/79     Weight: 124.3 kg (274 lb)  Body mass index is 40.46 kg/m².    Intake/Output Summary (Last 24 hours) at 9/19/2022 1216  Last data filed at 9/19/2022 0400  Gross per 24 hour   Intake 240 ml   Output 1100 ml   Net -860 ml      Physical Exam  Vitals and nursing note reviewed.   Constitutional:       General: He is not in acute distress.     Appearance: He is obese. He is not toxic-appearing or diaphoretic.   HENT:      Head: Normocephalic and atraumatic.      Nose: Nose normal.      Mouth/Throat:      Mouth: Mucous membranes are moist.   Eyes:      Pupils: Pupils are equal, round, and reactive to light.   Cardiovascular:      Rate and Rhythm: Normal rate and regular rhythm.   Pulmonary:      Effort: Pulmonary effort is normal. No respiratory distress.      Breath sounds: No wheezing, rhonchi or rales.   Abdominal:       General: Bowel sounds are normal. There is no distension.      Palpations: Abdomen is soft.      Tenderness: There is no abdominal tenderness. There is no guarding.   Musculoskeletal:         General: Deformity (L foot amputation) present. Normal range of motion.      Cervical back: Normal range of motion.      Right lower leg: Edema present.      Left lower leg: Edema present.   Skin:     General: Skin is warm and dry.      Capillary Refill: Capillary refill takes 2 to 3 seconds.      Comments: BLE covered with dressings, CDI   Neurological:      General: No focal deficit present.      Mental Status: He is alert and oriented to person, place, and time.      Sensory: Sensory deficit present.      Motor: No weakness.   Psychiatric:         Mood and Affect: Mood normal.         Behavior: Behavior normal.       MELD-Na score: 9 at 9/19/2022  9:33 AM  MELD score: 9 at 9/19/2022  9:33 AM  Calculated from:  Serum Creatinine: 1.2 mg/dL at 9/19/2022  9:33 AM  Serum Sodium: 133 mmol/L at 9/19/2022  9:33 AM  Total Bilirubin: 0.4 mg/dL (Using min of 1 mg/dL) at 9/19/2022  9:33 AM  INR(ratio): 1.1 at 9/18/2022  3:38 PM  Age: 72 years    Significant Labs:  CBC:  Recent Labs   Lab 09/18/22  1153 09/18/22  1538 09/19/22  0933   WBC 5.63 5.96 5.24   HGB 9.6* 9.6* 8.9*   HCT 30.4* 30.2* 28.3*    291 289     CMP:  Recent Labs   Lab 09/17/22  1609 09/18/22  0240 09/19/22  0933    139 133*   K 3.0* 3.1* 4.4   CL 98 98 99   CO2 31* 30* 29   * 74 237*   BUN 15 14 15   CREATININE 1.4 1.1 1.2   CALCIUM 8.6* 8.9 8.5*   PROT 7.4 7.1 6.7   ALBUMIN 2.8* 2.6* 2.4*   BILITOT 0.4 0.3 0.4   ALKPHOS 79 75 71   AST 12 12 12   ALT 6* 7* 8*   ANIONGAP 11 11 5*     PTINR:  Recent Labs   Lab 09/18/22  1538   INR 1.1           Assessment/Plan:      Chest pain  -Received ASA in the ED, continue daily.   -Troponin 0.018>0.010, continue to trend.  -Reviewed EKG.  -Continuous telemetry monitoring.  -D dimer 1.55  -PRN EKG for chest  pain  - TTE  findings 9/18   1 - Normal left ventricular systolic function (EF 60-65%).     2 - Normal left ventricular diastolic function.     3 - Normal right ventricular systolic function .     4 - Trivial aortic regurgitation.     5 - Trivial tricuspid regurgitation.     6 - The estimated PA systolic pressure is 26 mmHg.     7 - Mild left atrial enlargement.      - CTA:   Artifact limited study with suboptimal opacification of the pulmonary arteries, however there is no evidence of large pulmonary thromboembolism.  Further evaluation/follow-up as clinically warranted.Minimal patchy ground-glass opacities mainly in the left lower lobe could represent early infectious/inflammatory change.Dilatation of pulmonary trunk, which can represent pulmonary artery hypertension.Mediastinal lymphadenopathy, stable from prior.    Plan:  - Trops positive. Treat as NSTEMI. Loaded with plavix. Start plavix 75mg qd from tomorrow. Start heparin drip per ACS protocol. Consulted IC for eval for cath since patient ahs risk factors. Recs as follows.  Patient is currently chest pain-free.  Troponins back down to normal.  No EKG changes concerning for ischemia or infarctions.  Echocardiogram unremarkable.  -recommend medical management with aspirin, Plavix (triple therapy x1 week then stop aspirin then continue on Plavix and Eliquis)  -continue statin.  Cannot do beta-blocker given type 1 Mobitz  -patient may follow-up with me in the clinic  -if patient becomes symptomatic, please feel free to reach out to Interventional Cardiology for any questions or concerns.    -Will likely order stress test in outpatient setting  -recommend strict glycemic control    - Check lipid panel and tsh . AIC 9  - Keep K >4and Mg >2         Acute deep vein thrombosis (DVT) of right upper extremity  -Diagnosed during hospitalization in June.  -Continue eliquis.    Morbid obesity  Body mass index is 40.46 kg/m². Morbid obesity complicates all aspects of  disease management from diagnostic modalities to treatment. Weight loss encouraged and health benefits explained to patient.     Hypokalemia  Patient has hypokalemia which is currently uncontrolled. Last electrolytes reviewed-   Recent Labs   Lab 09/17/22  1609 09/18/22  0240   K 3.0* 3.1*   . Will replace potassium and monitor electrolytes closely. Continuous telemetry.    Osteomyelitis of left lower extremity  Pt has a PMHx left midtarsal amputation 2010, left calcaneal osteomyelitis s/p treatment in the past who was admitted in June for infected left heel ulceration. Tib/fib xray of the left leg notable for osteomyelitis of the posterior calcaneus. Vascular studies were concerning for left lower leg arterial stenosis and calf vessel narrowing/occulsions. Wound culture returned positive for GBS, MRSA, prevotella and porphromonas. Bone culture grew Staph epidermidis. Bone never sent for pathology. Vascular surgery recommended AKA vs BKA. Patient decided to attempt abx and deferred surgery.  He was discharged to [facility] on Daptomycin x 6 weeks and PO Flagyl x 14 days. Daptomycin was extended for 2 additional weeks and cefepime added for 2 weeks.  -Follows outpatient with podiatry and ID.  -Wound care consult.    Peripheral arterial disease  -Continue home statin and ASA    Asymptomatic Mobitz (type) I (Wenckebach's) atrioventricular block  -Chronic issue.  -Avoid BB or CCB.    Anemia of chronic disease  Patient's anemia is currently controlled. S/p 0 units of PRBCs.   Current CBC reviewed-   Lab Results   Component Value Date    HGB 13.8 (L) 09/17/2022    HCT 44.7 09/17/2022     Monitor serial CBC and transfuse if patient becomes hemodynamically unstable, symptomatic or H/H drops below 7/21.     Chronic kidney disease, stage III (moderate)  Creatine stable for now. BMP reviewed- noted Estimated Creatinine Clearance: 61.2 mL/min (based on SCr of 1.4 mg/dL). according to latest data. Monitor UOP and serial BMP  and adjust therapy as needed. Renally dose meds.    Type 2 diabetes, uncontrolled, with neuropathy  Patient's FSGs are uncontrolled due to hyperglycemia on current medication regimen.  Last A1c reviewed-   Lab Results   Component Value Date    HGBA1C 9.9 (H) 09/17/2022     Most recent fingerstick glucose reviewed- No results for input(s): POCTGLUCOSE in the last 24 hours.  Current correctional scale  Low  Maintain anti-hyperglycemic dose as follows-   Antihyperglycemics (From admission, onward)    Start     Stop Route Frequency Ordered    09/18/22 0715  insulin aspart U-100 pen 12 Units         -- SubQ 3 times daily with meals 09/17/22 2152 09/17/22 2300  insulin detemir U-100 pen 40 Units         -- SubQ Nightly 09/17/22 2235    09/17/22 2252  insulin aspart U-100 pen 0-5 Units         -- SubQ Before meals & nightly PRN 09/17/22 2152        Hold Oral hypoglycemics while patient is in the hospital.  -Accuchecks AC/HS  -Diabetic/cardiac diet    Essential hypertension  -Hypertensive in the ED, received home medications.  -Continue hydralazine, isodil, losartan, and nifedipine.  -Monitor BP closely.      VTE Risk Mitigation (From admission, onward)         Ordered     heparin 25,000 units in dextrose 5% (100 units/ml) IV bolus from bag - ADDITIONAL PRN BOLUS - 60 units/kg (max bolus 4000 units)  As needed (PRN)        Question:  Heparin Infusion Adjustment (DO NOT MODIFY ANSWER)  Answer:  [email or web address]\[email or web address]\Images\Pharmacy\HeparinInfusions\heparin LOW INTENSITY nomogram with ANTI-XA [id number]    09/18/22 1455     heparin 25,000 units in dextrose 5% (100 units/ml) IV bolus from bag - ADDITIONAL PRN BOLUS - 30 units/kg (max bolus 4000 units)  As needed (PRN)        Question:  Heparin Infusion Adjustment (DO NOT MODIFY ANSWER)  Answer:  [email or web address]\[email or web address]\Images\Pharmacy\HeparinInfusions\heparin LOW INTENSITY nomogram with ANTI-XA [id number]    09/18/22 1455     heparin 25,000 units in dextrose 5% 250 mL (100  units/mL) infusion LOW INTENSITY nomogram Anti- Xa  Continuous        Question Answer Comment   Heparin Infusion Adjustment (DO NOT MODIFY ANSWER) [email or web address]\[email or web address]\Images\Pharmacy\HeparinInfusions\heparin LOW INTENSITY nomogram with ANTI-XA [id number]    Begin at (in units/kg/hr) 12        09/18/22 1455     IP VTE HIGH RISK PATIENT  Once         09/18/22 0108     Place sequential compression device  Until discontinued         09/18/22 0108                Discharge Planning   OXANA: 9/20/2022     Code Status: Full Code   Is the patient medically ready for discharge?:     Reason for patient still in hospital (select all that apply): Patient trending condition  Discharge Plan A: Home Health                  Merlin Kearney MD  Department of Hospital Medicine   [facility] - Intensive Care ([place]-16)

## 2022-09-19 NOTE — PLAN OF CARE
[facility] ([place] [id number])  Initial Discharge Assessment       Primary Care Provider: Mart Escalante MD    Admission Diagnosis: Hypokalemia [E87.6]  Chest discomfort [R07.89]  Chest pain [R07.9]  Mobitz type 1 second degree AV block [I44.1]    Admission Date: 9/17/2022  Expected Discharge Date:     Discharge Barriers Identified: (P) None    Payor: HU[employer] MANAGED MEDICARE / Plan: HU[employer]NA SNP (SPECIAL NEEDS PLAN) / Product Type: Medicare Advantage /     Extended Emergency Contact Information  Primary Emergency Contact: Kandy Castañeda   [place]  Mobile Phone: [phone]  Relation: Sister  Secondary Emergency Contact: January Nicholson   [place]  Mobile Phone: [phone]  Relation: Sister    Discharge Plan A: (P) [id number] - [place] - [facility] [place] [address] AT [facility] [address] [place]  [facility] [place] [address]  Phone: [phone] Fax: [phone]      Initial Assessment (most recent)       Adult Discharge Assessment - 09/19/22 1044          Discharge Assessment    Assessment Type Discharge Planning Assessment (P)      Confirmed/corrected address, phone number and insurance Yes (P)      Source of Information patient (P)      Reason For Admission hypokalemia, chest pain (P)      Lives With sibling(s) (P)      Facility Arrived From: home (P)      Do you expect to return to your current living situation? Yes (P)      Do you have help at home or someone to help you manage your care at home? Yes (P)      Who are your caregiver(s) and their phone number(s)? [facility] (P)      Prior to hospitilization cognitive status: Alert/Oriented (P)      Current cognitive status: Alert/Oriented (P)      Walking or Climbing Stairs Difficulty other (see comments) (P)    pt has a w/c and a walker rarely uses walker, usually w/c bound    Dressing/Bathing Difficulty bathing difficulty, requires equipment (P)       Dressing/Bathing Management wheelchair (P)      Home Accessibility wheelchair accessible (P)      Home Layout Able to live on 1st floor (P)      Equipment Currently Used at Home wheelchair;walker, rolling (P)      Readmission within 30 days? No (P)      Patient currently being followed by outpatient case management? No (P)      Do you currently have service(s) that help you manage your care at home? Yes (P)      Name and Contact number of agency [facility]  (P)      Is the pt/caregiver preference to resume services with current agency Yes (P)      Do you take prescription medications? Yes (P)      Do you have prescription coverage? Yes (P)      Coverage human[employer] and medicaid (P)      Do you have any problems affording any of your prescribed medications? No (P)      Is the patient taking medications as prescribed? yes (P)      Who is going to help you get home at discharge? transport (P)      How do you get to doctors appointments? health plan transportation (P)      Are you on dialysis? No (P)      Do you take coumadin? No (P)      Discharge Plan A Home Health (P)      DME Needed Upon Discharge  none (P)      Discharge Plan discussed with: Patient (P)      Discharge Barriers Identified None (P)                         Pt lives with his brother and sister, has [facility], uses a w/c,

## 2022-09-19 NOTE — PLAN OF CARE
Problem: Adult Inpatient Plan of Care  Goal: Plan of Care Review  Outcome: Ongoing, Progressing     Problem: Adult Inpatient Plan of Care  Goal: Patient-Specific Goal (Individualized)  Outcome: Ongoing, Progressing     Problem: Adult Inpatient Plan of Care  Goal: Absence of Hospital-Acquired Illness or Injury  Outcome: Ongoing, Progressing     Problem: Adult Inpatient Plan of Care  Goal: Optimal Comfort and Wellbeing  Outcome: Ongoing, Progressing     Problem: Adult Inpatient Plan of Care  Goal: Readiness for Transition of Care  Outcome: Ongoing, Progressing     Problem: Diabetes Comorbidity  Goal: Blood Glucose Level Within Targeted Range  Outcome: Ongoing, Progressing     Problem: Fluid and Electrolyte Imbalance (Acute Kidney Injury/Impairment)  Goal: Fluid and Electrolyte Balance  Outcome: Ongoing, Progressing     Problem: Oral Intake Inadequate (Acute Kidney Injury/Impairment)  Goal: Optimal Nutrition Intake  Outcome: Ongoing, Progressing     Problem: Renal Function Impairment (Acute Kidney Injury/Impairment)  Goal: Effective Renal Function  Outcome: Ongoing, Progressing     Problem: Impaired Wound Healing  Goal: Optimal Wound Healing  Outcome: Ongoing, Progressing   Patient AAOX4 no acute distress noted VSS RA denies any complaints of pain or Sob SB on cardiac monitor [id number] Dr. Ny notified of patient status no new orders noted patient sitting up in w/c watching TV safety measures maintained will cont to monitor.

## 2022-09-19 NOTE — PROGRESS NOTES
Subjective:      Patient ID: Saji Castañeda is a 72 y.o. male.    Chief Complaint: Wound Care and foot ball    Saji is a 72 y.o. male who presents to the clinic for evaluation and treatment of high risk feet. Saji has a past medical history of Arthritis, Diabetes mellitus, Diabetes mellitus, type 2, Hyperlipidemia, and Osteomyelitis. The patient's chief complaint is diabetic foot ulcer, L heel and leg . This patient has documented high risk feet requiring routine maintenance secondary to peripheral neuropathy.  No issues w/ abx   6/7/22: Saji Castañeda presebnts for f/u l heel ulcer. Has been lost to follow up since April. Patient currently  denies nausea, vomiting, fever, chills, fatigue, and  diarrhea. Blood sugars have been stable  6/15/22: Saji Castañeda Patient has been in football dressing/unna, x 1 week with out issues   7/13/22: Saji Castañeda presents f/u hospital d/c . Is currently at [facility]  7/20/22: seen 2 days ago at [facility]. Is being worked up for hbot  7/27/22: reports new onset R heel pain. worse when in bed  8/25/22: denies pain to the heel ulceration     PCP: Mart Escalante MD    Date Last Seen by PCP: Patient does not have a PCP or has not yet seen their PCP     Chief Complaint   Patient presents with    Wound Care    foot ball     History of Trauma: negative  Sign of Infection: none    Hemoglobin A1C   Date Value Ref Range Status   09/17/2022 9.9 (H) 4.0 - 5.6 % Final     Comment:     ADA Screening Guidelines:  5.7-6.4%  Consistent with prediabetes  >or=6.5%  Consistent with diabetes    High levels of fetal hemoglobin interfere with the HbA1C  assay. Heterozygous hemoglobin variants (HbS, HgC, etc)do  not significantly interfere with this assay.   However, presence of multiple variants may affect accuracy.     06/16/2022 9.5 (H) 4.0 - 5.6 % Final     Comment:     ADA Screening Guidelines:  5.7-6.4%  Consistent with prediabetes  >or=6.5%  Consistent with diabetes    High levels of fetal  "hemoglobin interfere with the HbA1C  assay. Heterozygous hemoglobin variants (HbS, HgC, etc)do  not significantly interfere with this assay.   However, presence of multiple variants may affect accuracy.     2020 7.9 (H) 4.0 - 5.6 % Final     Comment:     ADA Screening Guidelines:  5.7-6.4%  Consistent with prediabetes  >or=6.5%  Consistent with diabetes  High levels of fetal hemoglobin interfere with the HbA1C  assay. Heterozygous hemoglobin variants (HbS, HgC, etc)do  not significantly interfere with this assay.   However, presence of multiple variants may affect accuracy.         Review of Systems   Constitutional: Negative for chills and fever.   Cardiovascular: Positive for leg swelling. Negative for chest pain and claudication.   Respiratory: Negative for cough and shortness of breath.    Skin: Positive for color change, dry skin, nail changes (R foot only. L foot amputation) and poor wound healing (L leg and heel ulcers). Negative for flushing, itching and rash.   Musculoskeletal: Negative for back pain, falls, gout and joint pain.        L foot amputation (Choparts)    Gastrointestinal: Negative for nausea and vomiting.   Neurological: Positive for numbness and sensory change. Negative for paresthesias.   Psychiatric/Behavioral: Negative for altered mental status.           Objective:       Vitals:    22 1313   BP: (!) 178/56   Pulse: (!) 44   Resp: 18   Weight: 124.3 kg (274 lb)   Height: 5' 9" (1.753 m)   PainSc: 0-No pain        Physical Exam  Vitals reviewed.   Constitutional:       Appearance: He is well-developed.   HENT:      Head: Normocephalic.   Cardiovascular:      Comments: DP/PT pulses diminished b/l CRT < 3 sec to tips of toes.    Pulmonary:      Effort: No respiratory distress.   Musculoskeletal:      Right lower le+ Edema present.      Left lower le+ Edema present.      Comments: L foot chopart's amputation.      Skin:     General: Skin is warm and dry.      Coloration: " Skin is not jaundiced or pale.      Findings: No bruising or erythema.       Diminished pedal hair b/l.     Ulcer L heel:6.5x3.5x0.3  cm  Depth: bone  Periphery: hyperkeratotic rim w/ +maceration,  malodor(improved)  Base: granular   Drainage: Serosanguinous-       Neurological:      Mental Status: He is alert and oriented to person, place, and time.      Sensory: Sensory deficit present.      Comments: Light touch, proprioception, and sharp/dull sensation are all intact bilaterally. Protective threshold with the [place]-Wienstein monofilament is diminished bilaterally.    Psychiatric:         Behavior: Behavior normal.         Thought Content: Thought content normal.         Judgment: Judgment normal.                                 Assessment:       Encounter Diagnoses   Name Primary?    Type II diabetes mellitus with neurological manifestations Yes    PVD (peripheral vascular disease)     Heel ulcer, left, with necrosis of bone     Leg swelling              Plan:       Saji was seen today for wound care and foot ball.    Diagnoses and all orders for this visit:    Type II diabetes mellitus with neurological manifestations    PVD (peripheral vascular disease)    Heel ulcer, left, with necrosis of bone    Leg swelling    I counseled the patient on his conditions, their implications and medical management.  Again. Long discussion w/ pt regarding limb salvageability  Pt w/ incontinence issues, lives sedentary lifestyle due to heel ulcer. Sits on wheel chair w/ foot on the ground for most of the day   Pt still not interested in proximal amputation   Discussed possibility of ;life threatening infection , pt voices understanding    L leg swelling  Still present w/ new superficial skin abrasions likely 2/2 edema   Exposed calc again observed   Nonviable tissues were debrided beyond the level of bone utilizing a  sterile No. 15 scalpel and forceps. Minimal bleeding controlled with direct pressure  The patient tolerated  this well.   HB foam to L foot/leg ulcers x 3  Katarzyna Arteaga assisted w/ application of football dressing under my direct supervision. Darco shoe applied to offload the area. Advised patient that this should be worn on the affected foot at all times when ambulating   Patient dispensed Tubigrip and instructed on use to assist with edema control

## 2022-09-19 NOTE — SUBJECTIVE & OBJECTIVE
Interval History: Patient remains CP free. Denies SOB, n/v. Diaphoresis or lightheadedness.     Review of Systems   Constitutional:  Negative for appetite change, chills, fatigue, fever and unexpected weight change.   HENT:  Negative for congestion, rhinorrhea, sore throat and trouble swallowing.    Eyes:  Negative for photophobia and visual disturbance.   Respiratory:  Negative for cough (non productive), shortness of breath and wheezing.         +mild RICCI   Cardiovascular:  Positive for leg swelling (chronic). Negative for palpitations.   Gastrointestinal:  Negative for abdominal distention, abdominal pain, diarrhea, nausea and vomiting.   Genitourinary:  Negative for dysuria, flank pain, frequency and hematuria.   Musculoskeletal:  Negative for arthralgias, back pain, gait problem and myalgias.   Skin:  Positive for wound (BLE). Negative for color change, pallor and rash.   Neurological:  Negative for dizziness, speech difficulty, weakness, light-headedness and numbness.   Psychiatric/Behavioral:  Negative for agitation, confusion and suicidal ideas. The patient is not nervous/anxious.    Objective:     Vital Signs (Most Recent):  Temp: 98.9 °F (37.2 °C) (09/19/22 1206)  Pulse: (!) 46 (09/19/22 1206)  Resp: 20 (09/19/22 1206)  BP: (!) 184/79 (09/19/22 1206)  SpO2: 97 % (09/19/22 1206)   Vital Signs (24h Range):  Temp:  [98.1 °F (36.7 °C)-99.8 °F (37.7 °C)] 98.9 °F (37.2 °C)  Pulse:  [46-78] 46  Resp:  [18-20] 20  SpO2:  [93 %-97 %] 97 %  BP: (130-184)/(62-79) 184/79     Weight: 124.3 kg (274 lb)  Body mass index is 40.46 kg/m².    Intake/Output Summary (Last 24 hours) at 9/19/2022 1216  Last data filed at 9/19/2022 0400  Gross per 24 hour   Intake 240 ml   Output 1100 ml   Net -860 ml      Physical Exam  Vitals and nursing note reviewed.   Constitutional:       General: He is not in acute distress.     Appearance: He is obese. He is not toxic-appearing or diaphoretic.   HENT:      Head: Normocephalic and  atraumatic.      Nose: Nose normal.      Mouth/Throat:      Mouth: Mucous membranes are moist.   Eyes:      Pupils: Pupils are equal, round, and reactive to light.   Cardiovascular:      Rate and Rhythm: Normal rate and regular rhythm.   Pulmonary:      Effort: Pulmonary effort is normal. No respiratory distress.      Breath sounds: No wheezing, rhonchi or rales.   Abdominal:      General: Bowel sounds are normal. There is no distension.      Palpations: Abdomen is soft.      Tenderness: There is no abdominal tenderness. There is no guarding.   Musculoskeletal:         General: Deformity (L foot amputation) present. Normal range of motion.      Cervical back: Normal range of motion.      Right lower leg: Edema present.      Left lower leg: Edema present.   Skin:     General: Skin is warm and dry.      Capillary Refill: Capillary refill takes 2 to 3 seconds.      Comments: BLE covered with dressings, CDI   Neurological:      General: No focal deficit present.      Mental Status: He is alert and oriented to person, place, and time.      Sensory: Sensory deficit present.      Motor: No weakness.   Psychiatric:         Mood and Affect: Mood normal.         Behavior: Behavior normal.       MELD-Na score: 9 at 9/19/2022  9:33 AM  MELD score: 9 at 9/19/2022  9:33 AM  Calculated from:  Serum Creatinine: 1.2 mg/dL at 9/19/2022  9:33 AM  Serum Sodium: 133 mmol/L at 9/19/2022  9:33 AM  Total Bilirubin: 0.4 mg/dL (Using min of 1 mg/dL) at 9/19/2022  9:33 AM  INR(ratio): 1.1 at 9/18/2022  3:38 PM  Age: 72 years    Significant Labs:  CBC:  Recent Labs   Lab 09/18/22  1153 09/18/22  1538 09/19/22  0933   WBC 5.63 5.96 5.24   HGB 9.6* 9.6* 8.9*   HCT 30.4* 30.2* 28.3*    291 289     CMP:  Recent Labs   Lab 09/17/22  1609 09/18/22  0240 09/19/22  0933    139 133*   K 3.0* 3.1* 4.4   CL 98 98 99   CO2 31* 30* 29   * 74 237*   BUN 15 14 15   CREATININE 1.4 1.1 1.2   CALCIUM 8.6* 8.9 8.5*   PROT 7.4 7.1 6.7    ALBUMIN 2.8* 2.6* 2.4*   BILITOT 0.4 0.3 0.4   ALKPHOS 79 75 71   AST 12 12 12   ALT 6* 7* 8*   ANIONGAP 11 11 5*     PTINR:  Recent Labs   Lab 09/18/22  1538   INR 1.1

## 2022-09-19 NOTE — CONSULTS
NIAS consulted for PIV access.  NIAS not needed at this time due to patient having adequate access. Re consult NIAS if needed.

## 2022-09-19 NOTE — HOSPITAL COURSE
Saji Castañeda is a 72 y.o. male with a PMHx of DM2, PVD, HLD, CKD, Chronic L ankle/foot osteomyelitis, and RUE DVT who presented to the ED with complaints of substernal chest pain at rest. In the ED, patient noted to be hypertensive, otherwise VSSAF. WBC 3.58k. Potassium 3.0. Glucose 127. D dimer 1.55. TSH WNL. BNP 63. Troponin 0.018>0.010. EKG shows sinus rhythm, rate of 54 with 2nd degree Mobitz type 1, similar to previous. CXR with pulmonary findings are likely the result of shallow inspiratory effort and habitus, no large focal consolidation. CTA chest neg for PE. Patient loaded with aspirin and plavix. Started on heparin drip. IC consulted to eval for cath. Per IC no plancs for cath at this time since trop are back to normal. ABIMBOLA unremakable. Continue medical management with aspirin, Plavix (triple therapy x1 week then stop aspirin then continue on Plavix and Eliquis. Continue statin.  Cannot do beta-blocker given type 1 Mobitz. Patient advised to follow-up with Dr. Morris in the clinic. Referral provided. They will likely order stress test in outpatient setting. Recommend strict glycemic control. Patient remains HDS and CP free. FU with PCP in 1 week post discharge.

## 2022-09-19 NOTE — NURSING
Heparin drip held at 0800am by cardiologist during nightshift.     No orders received to restart the heparin yet. Order not d/c'd either.

## 2022-09-19 NOTE — PLAN OF CARE
[facility] ([place] [address])      HOME HEALTH ORDERS  FACE TO FACE ENCOUNTER    Patient Name: Saji Castañeda  YOB: 1950    PCP: Mart Escalante MD   PCP Address: [address] / [place]  PCP Phone Number: [phone]  PCP Fax: [phone]    Encounter Date: 9/17/22    Admit to Home Health    Diagnoses:  Active Hospital Problems    Diagnosis  POA    *NSTEMI (non-ST elevated myocardial infarction) [I21.4]  Unknown     Priority: 1 - High    Chest pain [R07.9]  Yes     Priority: 1 - High    Acute deep vein thrombosis (DVT) of right upper extremity [I82.621]  Yes    Morbid obesity [E66.01]  Yes    Hypokalemia [E87.6]  Yes    Osteomyelitis of left lower extremity [M86.9]  Yes    Peripheral arterial disease [I73.9]  Yes     Chronic    Asymptomatic Mobitz (type) I (Wenckebach's) atrioventricular block [I44.1]  Yes    Anemia of chronic disease [D63.8]  Yes    Chronic kidney disease, stage III (moderate) [N18.30]  Yes    Type 2 diabetes, uncontrolled, with neuropathy [E11.40, E11.65]  Yes     Chronic    Essential hypertension [I10]  Yes     Chronic      Resolved Hospital Problems   No resolved problems to display.       Follow Up Appointments:  Future Appointments   Date Time Provider Department Center   9/22/2022  1:00 PM Anuja Farrell DO [facility] WOUND [facility]       Allergies:Review of patient's allergies indicates:  No Known Allergies    Medications: Review discharge medications with patient and family and provide education.    Current Facility-Administered Medications   Medication Dose Route Frequency Provider Last Rate Last Admin    acetaminophen tablet 650 mg  650 mg Oral Q6H PRN Rachna Salcido NP        ascorbic acid (vitamin C) tablet 500 mg  500 mg Oral Daily Rachna Salcido NP   500 mg at 09/19/22 0909    aspirin EC tablet 81 mg  81 mg Oral Daily Rachna Salcido NP   81 mg at 09/19/22 0908    atorvastatin tablet 80 mg  80 mg Oral Daily Rachna Salcido,  NP   80 mg at 09/19/22 0908    cefTRIAXone (ROCEPHIN) 1 g/50 mL D5W IVPB  1 g Intravenous Q24H Rachna Salcido NP   Stopped at 09/19/22 0949    clopidogreL tablet 75 mg  75 mg Oral Daily Merlin Kearney MD   75 mg at 09/19/22 0908    dextrose 10% bolus 125 mL  12.5 g Intravenous PRN Rachna Salcido NP        dextrose 10% bolus 250 mL  25 g Intravenous PRN Rachna Salcido NP        gabapentin capsule 100 mg  100 mg Oral Daily Rachna Salcido NP   100 mg at 09/19/22 0908    glucagon (human recombinant) injection 1 mg  1 mg Intramuscular PRN Rachna Salcido NP        glucose chewable tablet 16 g  16 g Oral PRN Rachna Salcido NP        glucose chewable tablet 24 g  24 g Oral PRN Rachna Salcido NP        heparin 25,000 units in dextrose 5% (100 units/ml) IV bolus from bag - ADDITIONAL PRN BOLUS - 30 units/kg (max bolus 4000 units)  30 Units/kg (Adjusted) Intravenous PRN Merlin Kearney MD        heparin 25,000 units in dextrose 5% (100 units/ml) IV bolus from bag - ADDITIONAL PRN BOLUS - 60 units/kg (max bolus 4000 units)  43.4 Units/kg (Adjusted) Intravenous PRN Merlin Kearney MD        heparin 25,000 units in dextrose 5% 250 mL (100 units/mL) infusion LOW INTENSITY nomogram Anti- Xa  0-40 Units/kg/hr (Adjusted) Intravenous Continuous Merlin Kearney MD   Held at 09/19/22 0700    hydrALAZINE tablet 25 mg  25 mg Oral TID Rachna Salcido NP   25 mg at 09/19/22 0908    insulin aspart U-100 pen 0-5 Units  0-5 Units Subcutaneous QID (AC + HS) PRN Rachna Salcido NP        insulin aspart U-100 pen 12 Units  12 Units Subcutaneous TIDWM Rachna Salcido NP   12 Units at 09/19/22 0915    insulin detemir U-100 pen 35 Units  35 Units Subcutaneous QHS Rachna Salcido NP   35 Units at 09/18/22 2046    isosorbide dinitrate tablet 10 mg  10 mg Oral TID Rachna Salcido NP   10 mg at 09/19/22 0908    Lactobacillus rhamnosus GG capsule 1 capsule  1 capsule Oral BID Rachna Salcido NP   1 capsule at  "09/19/22 0909    losartan tablet 100 mg  100 mg Oral Daily Rachna Salcido NP   100 mg at 09/19/22 0908    melatonin tablet 6 mg  6 mg Oral Nightly PRN Rachna Salcido NP        prochlorperazine injection Soln 5 mg  5 mg Intravenous Q6H PRN Rachna Salcido NP        sodium chloride 0.9% flush 10 mL  10 mL Intravenous Q12H PRN Rachna Salcido NP        tamsulosin 24 hr capsule 0.4 mg  0.4 mg Oral Daily Rachna Salcido NP   0.4 mg at 09/19/22 0908     Current Discharge Medication List        START taking these medications    Details   apixaban (ELIQUIS) 5 mg Tab Take 1 tablet (5 mg total) by mouth 2 (two) times daily.  Qty: 60 tablet, Refills: 0      clopidogreL (PLAVIX) 75 mg tablet Take 1 tablet (75 mg total) by mouth once daily.  Qty: 60 tablet, Refills: 0           CONTINUE these medications which have CHANGED    Details   aspirin (ECOTRIN) 81 MG EC tablet Take 1 tablet (81 mg total) by mouth once daily. for 6 days  Qty: 7 tablet, Refills: 0           CONTINUE these medications which have NOT CHANGED    Details   acetaminophen (TYLENOL) 500 MG tablet Take 1,000 mg by mouth every 8 (eight) hours as needed for Pain.      ascorbic acid, vitamin C, (VITAMIN C) 500 MG tablet Take 500 mg by mouth once daily.      BD ULTRA-FINE ADITYA PEN NEEDLE 32 gauge x 5/32" Ndle USE FOUR TIMES DAILY WITH MEALS AND NIGHTLY  Qty: 100 each, Refills: 0    Associated Diagnoses: Type II diabetes mellitus, uncontrolled      blood sugar diagnostic (CONTOUR TEST STRIPS) Strp Inject 1 strip into the skin 2 (two) times daily before meals.  Qty: 100 strip, Refills: 6    Comments: To use with Contour Ascencia meter  Associated Diagnoses: Type II diabetes mellitus, uncontrolled      gabapentin (NEURONTIN) 100 MG capsule Take 2 capsules (200 mg total) by mouth 2 (two) times daily.  Qty: 120 capsule, Refills: 1      hydrALAZINE (APRESOLINE) 25 MG tablet Take 1 tablet (25 mg total) by mouth 3 (three) times daily.  Qty: 90 tablet, " Refills: 11    Comments: .      !! insulin aspart U-100 (NOVOLOG) 100 unit/mL (3 mL) InPn pen Inject 0-5 Units into the skin before meals and at bedtime as needed (Hyperglycemia). **LOW CORRECTION DOSE**  Blood Glucose  mg/dL                  Pre-meal                2200  151-200                0 unit                      0 unit  201-250                2 units                    1 unit  251-300                3 units                    1 unit  301-350                4 units                    2 units  >350                     5 units                    3 units  Administer subcutaneously if needed at times designated by monitoring schedule.  Refills: 0      !! insulin aspart U-100 (NOVOLOG) 100 unit/mL (3 mL) InPn pen Inject 20 Units into the skin 3 (three) times daily with meals.  Refills: 0      isosorbide dinitrate (ISORDIL) 10 MG tablet Take 1 tablet (10 mg total) by mouth 3 (three) times daily.  Qty: 90 tablet, Refills: 11      Lactobacillus rhamnosus GG (CULTURELLE) 10 billion cell capsule Take 1 capsule by mouth 2 (two) times daily.      LANTUS SOLOSTAR U-100 INSULIN glargine 100 units/mL SubQ pen Inject 45 Units into the skin every evening.  Refills: 0      MICROLET LANCET Misc Refills: 0      NIFEdipine (PROCARDIA-XL) 90 MG (OSM) 24 hr tablet Take 1 tablet (90 mg total) by mouth once daily.    Comments: .      rosuvastatin (CRESTOR) 40 MG Tab Take 40 mg by mouth once daily.      tamsulosin (FLOMAX) 0.4 mg Cap Take 1 capsule (0.4 mg total) by mouth once daily.    Associated Diagnoses: Frequency of urination       !! - Potential duplicate medications found. Please discuss with provider.        STOP taking these medications       hydroCHLOROthiazide (HYDRODIURIL) 25 MG tablet Comments:   Reason for Stopping:                 I have seen and examined this patient within the last 30 days. My clinical findings that support the need for the home health skilled services and home bound status are the  following:no   Weakness/numbness causing balance and gait disturbance due to Infection and Weakness/Debility making it taxing to leave home.     Diet:   cardiac diet    Labs:  Report Lab results to PCP.    Referrals/ Consults  Aide to provide assistance with personal care, ADLs, and vital signs.    Activities:   activity as tolerated    Nursing:   Agency to admit patient within 24 hours of hospital discharge unless specified on physician order or at patient request    SN to complete comprehensive assessment including routine vital signs. Instruct on disease process and s/s of complications to report to MD. Review/verify medication list sent home with the patient at time of discharge  and instruct patient/caregiver as needed. Frequency may be adjusted depending on start of care date.     Skilled nurse to perform up to 3 visits PRN for symptoms related to diagnosis    Notify MD if SBP > 160 or < 90; DBP > 90 or < 50; HR > 120 or < 50; Temp > 101; O2 < 88%; Other:       Ok to schedule additional visits based on staff availability and patient request on consecutive days within the home health episode.    When multiple disciplines ordered:    Start of Care occurs on Sunday - Wednesday schedule remaining discipline evaluations as ordered on separate consecutive days following the start of care.    Thursday SOC -schedule subsequent evaluations Friday and Monday the following week.     Friday - Saturday SOC - schedule subsequent discipline evaluations on consecutive days starting Monday of the following week.    For all post-discharge communication and subsequent orders please contact patient's primary care physician. If unable to reach primary care physician or do not receive response within 30 minutes, please contact PCP for clinical staff order clarification    Miscellaneous       Home Health Aide:  Nursing Three times weekly and Home Health Aide Three times weekly    Wound Care Orders  yes:  Foot Ulcer:  Location: b/l  LE    Dry or minimal exudate wound: Apply wound gel daily (supplied by [facility]), cover with telfa dressing    I certify that this patient is confined to his home and needs intermittent skilled nursing care.

## 2022-09-20 VITALS
HEIGHT: 69 IN | RESPIRATION RATE: 19 BRPM | DIASTOLIC BLOOD PRESSURE: 65 MMHG | SYSTOLIC BLOOD PRESSURE: 132 MMHG | HEART RATE: 77 BPM | TEMPERATURE: 98 F | OXYGEN SATURATION: 97 % | BODY MASS INDEX: 40.58 KG/M2 | WEIGHT: 274 LBS

## 2022-09-20 LAB
ALBUMIN SERPL BCP-MCNC: 2.6 G/DL (ref 3.5–5.2)
ALP SERPL-CCNC: 74 U/L (ref 55–135)
ALT SERPL W/O P-5'-P-CCNC: 7 U/L (ref 10–44)
ANION GAP SERPL CALC-SCNC: 10 MMOL/L (ref 8–16)
AST SERPL-CCNC: 12 U/L (ref 10–40)
BASOPHILS # BLD AUTO: 0.03 K/UL (ref 0–0.2)
BASOPHILS NFR BLD: 0.5 % (ref 0–1.9)
BILIRUB SERPL-MCNC: 0.3 MG/DL (ref 0.1–1)
BUN SERPL-MCNC: 15 MG/DL (ref 8–23)
CALCIUM SERPL-MCNC: 9.2 MG/DL (ref 8.7–10.5)
CHLORIDE SERPL-SCNC: 103 MMOL/L (ref 95–110)
CO2 SERPL-SCNC: 25 MMOL/L (ref 23–29)
CREAT SERPL-MCNC: 1.3 MG/DL (ref 0.5–1.4)
DIFFERENTIAL METHOD: ABNORMAL
EOSINOPHIL # BLD AUTO: 0.4 K/UL (ref 0–0.5)
EOSINOPHIL NFR BLD: 6.5 % (ref 0–8)
ERYTHROCYTE [DISTWIDTH] IN BLOOD BY AUTOMATED COUNT: 17.5 % (ref 11.5–14.5)
EST. GFR  (NO RACE VARIABLE): 58 ML/MIN/1.73 M^2
FACT X PPP CHRO-ACNC: 0.12 IU/ML (ref 0.3–0.7)
GLUCOSE SERPL-MCNC: 233 MG/DL (ref 70–110)
HCT VFR BLD AUTO: 28.9 % (ref 40–54)
HGB BLD-MCNC: 9.3 G/DL (ref 14–18)
IMM GRANULOCYTES # BLD AUTO: 0.02 K/UL (ref 0–0.04)
IMM GRANULOCYTES NFR BLD AUTO: 0.3 % (ref 0–0.5)
LYMPHOCYTES # BLD AUTO: 1.1 K/UL (ref 1–4.8)
LYMPHOCYTES NFR BLD: 18.4 % (ref 18–48)
MAGNESIUM SERPL-MCNC: 1.8 MG/DL (ref 1.6–2.6)
MCH RBC QN AUTO: 26.8 PG (ref 27–31)
MCHC RBC AUTO-ENTMCNC: 32.2 G/DL (ref 32–36)
MCV RBC AUTO: 83 FL (ref 82–98)
MONOCYTES # BLD AUTO: 0.6 K/UL (ref 0.3–1)
MONOCYTES NFR BLD: 10.4 % (ref 4–15)
NEUTROPHILS # BLD AUTO: 3.8 K/UL (ref 1.8–7.7)
NEUTROPHILS NFR BLD: 63.9 % (ref 38–73)
NRBC BLD-RTO: 0 /100 WBC
PLATELET # BLD AUTO: 295 K/UL (ref 150–450)
PMV BLD AUTO: 10.1 FL (ref 9.2–12.9)
POCT GLUCOSE: 191 MG/DL (ref 70–110)
POCT GLUCOSE: 198 MG/DL (ref 70–110)
POTASSIUM SERPL-SCNC: 4.3 MMOL/L (ref 3.5–5.1)
PROT SERPL-MCNC: 7.2 G/DL (ref 6–8.4)
RBC # BLD AUTO: 3.47 M/UL (ref 4.6–6.2)
SODIUM SERPL-SCNC: 138 MMOL/L (ref 136–145)
WBC # BLD AUTO: 5.87 K/UL (ref 3.9–12.7)

## 2022-09-20 PROCEDURE — 63600175 PHARM REV CODE 636 W HCPCS: Performed by: HOSPITALIST

## 2022-09-20 PROCEDURE — 63600175 PHARM REV CODE 636 W HCPCS: Performed by: NURSE PRACTITIONER

## 2022-09-20 PROCEDURE — 96375 TX/PRO/DX INJ NEW DRUG ADDON: CPT

## 2022-09-20 PROCEDURE — 85025 COMPLETE CBC W/AUTO DIFF WBC: CPT | Performed by: NURSE PRACTITIONER

## 2022-09-20 PROCEDURE — 99217 PR OBSERVATION CARE DISCHARGE: ICD-10-PCS | Mod: ,,, | Performed by: STUDENT IN AN ORGANIZED HEALTH CARE EDUCATION/TRAINING PROGRAM

## 2022-09-20 PROCEDURE — 83735 ASSAY OF MAGNESIUM: CPT | Performed by: NURSE PRACTITIONER

## 2022-09-20 PROCEDURE — 25000003 PHARM REV CODE 250: Performed by: NURSE PRACTITIONER

## 2022-09-20 PROCEDURE — 85520 HEPARIN ASSAY: CPT | Performed by: STUDENT IN AN ORGANIZED HEALTH CARE EDUCATION/TRAINING PROGRAM

## 2022-09-20 PROCEDURE — 80053 COMPREHEN METABOLIC PANEL: CPT | Performed by: NURSE PRACTITIONER

## 2022-09-20 PROCEDURE — 96366 THER/PROPH/DIAG IV INF ADDON: CPT

## 2022-09-20 PROCEDURE — 25000003 PHARM REV CODE 250: Performed by: HOSPITALIST

## 2022-09-20 PROCEDURE — G0378 HOSPITAL OBSERVATION PER HR: HCPCS

## 2022-09-20 PROCEDURE — 25000003 PHARM REV CODE 250: Performed by: STUDENT IN AN ORGANIZED HEALTH CARE EDUCATION/TRAINING PROGRAM

## 2022-09-20 PROCEDURE — 99217 PR OBSERVATION CARE DISCHARGE: CPT | Mod: ,,, | Performed by: STUDENT IN AN ORGANIZED HEALTH CARE EDUCATION/TRAINING PROGRAM

## 2022-09-20 RX ORDER — HYDRALAZINE HYDROCHLORIDE 20 MG/ML
10 INJECTION INTRAMUSCULAR; INTRAVENOUS ONCE
Status: COMPLETED | OUTPATIENT
Start: 2022-09-20 | End: 2022-09-20

## 2022-09-20 RX ORDER — AMLODIPINE BESYLATE 10 MG/1
10 TABLET ORAL DAILY
Status: DISCONTINUED | OUTPATIENT
Start: 2022-09-20 | End: 2022-09-20 | Stop reason: HOSPADM

## 2022-09-20 RX ADMIN — ASPIRIN 81 MG: 81 TABLET, COATED ORAL at 08:09

## 2022-09-20 RX ADMIN — ATORVASTATIN CALCIUM 80 MG: 20 TABLET, FILM COATED ORAL at 08:09

## 2022-09-20 RX ADMIN — ISOSORBIDE DINITRATE 20 MG: 20 TABLET ORAL at 02:09

## 2022-09-20 RX ADMIN — INSULIN ASPART 12 UNITS: 100 INJECTION, SOLUTION INTRAVENOUS; SUBCUTANEOUS at 07:09

## 2022-09-20 RX ADMIN — TAMSULOSIN HYDROCHLORIDE 0.4 MG: 0.4 CAPSULE ORAL at 08:09

## 2022-09-20 RX ADMIN — GABAPENTIN 100 MG: 100 CAPSULE ORAL at 08:09

## 2022-09-20 RX ADMIN — ISOSORBIDE DINITRATE 20 MG: 20 TABLET ORAL at 08:09

## 2022-09-20 RX ADMIN — Medication 1 CAPSULE: at 08:09

## 2022-09-20 RX ADMIN — OXYCODONE HYDROCHLORIDE AND ACETAMINOPHEN 500 MG: 500 TABLET ORAL at 08:09

## 2022-09-20 RX ADMIN — CEFTRIAXONE 1 G: 1 INJECTION, SOLUTION INTRAVENOUS at 06:09

## 2022-09-20 RX ADMIN — HYDRALAZINE HYDROCHLORIDE 50 MG: 50 TABLET ORAL at 08:09

## 2022-09-20 RX ADMIN — LOSARTAN POTASSIUM 100 MG: 50 TABLET, FILM COATED ORAL at 08:09

## 2022-09-20 RX ADMIN — CLOPIDOGREL 75 MG: 75 TABLET, FILM COATED ORAL at 08:09

## 2022-09-20 RX ADMIN — AMLODIPINE BESYLATE 10 MG: 10 TABLET ORAL at 09:09

## 2022-09-20 RX ADMIN — HYDRALAZINE HYDROCHLORIDE 10 MG: 20 INJECTION, SOLUTION INTRAMUSCULAR; INTRAVENOUS at 12:09

## 2022-09-20 RX ADMIN — HYDRALAZINE HYDROCHLORIDE 50 MG: 50 TABLET ORAL at 02:09

## 2022-09-20 RX ADMIN — INSULIN ASPART 12 UNITS: 100 INJECTION, SOLUTION INTRAVENOUS; SUBCUTANEOUS at 12:09

## 2022-09-20 NOTE — DISCHARGE SUMMARY
"[facility] Tele[facility] Medicine  Discharge Summary      Patient Name: Saji Castañeda  MRN: [id number]  Patient Class: OP- Observation  Admission Date: 9/17/2022  Hospital Length of Stay: 0 days  Discharge Date and Time:  09/20/2022 3:01 PM  Attending Physician: Merlin Kearney MD   Discharging Provider: Merlin Kearney MD  Primary Care Provider: Mart Escalante MD  [facility] Medicine Team: [facility] MED A Merlin Kearney MD    HPI:   Saji Castañeda is a 72 y.o. male with a PMHx of DM2, PVD, HLD, CKD, Chronic L ankle/foot osteomyelitis, and RUE DVT who presents to the ED with complaints of chest pain. The patient reports he developed localized "stabbing" left lower sternal chest pain today around 9am while sitting in his wheelchair watching TV. Pain has been waxing and waning. Denies any aggravating or alleviating factors. Currently undergoing wound care treatments for BLE wounds. Last dressings were changed 2 days ago. No recent medication changes. The patient does endorse mild RICCI and non productive cough. Denies associated fever, SOB, abdominal pain, nausea, vomiting, urinary or bowel movement changes.    In the ED, patient noted to be hypertensive, otherwise VSSAF. WBC 3.58k. Potassium 3.0. Glucose 127. D dimer 1.55. TSH WNL. BNP 63. Troponin 0.018>0.010. EKG shows sinus rhythm, rate of 54 with 2nd degree Mobitz type 1, similar to previous. CXR with pulmonary findings are likely the result of shallow inspiratory effort and habitus, no large focal consolidation. CTA chest in process. The patient received ASA and potassium replacement.      * No surgery found *      Hospital Course:   Saji Castañeda is a 72 y.o. male with a PMHx of DM2, PVD, HLD, CKD, Chronic L ankle/foot osteomyelitis, and RUE DVT who presented to the ED with complaints of substernal chest pain at rest. In the ED, patient noted to be hypertensive, otherwise VSSAF. WBC 3.58k. Potassium 3.0. Glucose 127. D dimer 1.55. TSH WNL. BNP 63. " Troponin 0.018>0.010. EKG shows sinus rhythm, rate of 54 with 2nd degree Mobitz type 1, similar to previous. CXR with pulmonary findings are likely the result of shallow inspiratory effort and habitus, no large focal consolidation. CTA chest neg for PE. Patient loaded with aspirin and plavix. Started on heparin drip. IC consulted to eval for cath. Per IC no plancs for cath at this time since trop are back to normal. ABIMBOLA unremakable. Continue medical management with aspirin, Plavix (triple therapy x1 week then stop aspirin then continue on Plavix and Eliquis. Continue statin.  Cannot do beta-blocker given type 1 Mobitz. Patient advised to follow-up with Dr. Morris in the clinic. Referral provided. They will likely order stress test in outpatient setting. Recommend strict glycemic control. Patient remains HDS and CP free. FU with PCP in 1 week post discharge.       Goals of Care Treatment Preferences:  Code Status: Full Code      Consults:   Consults (From admission, onward)        Status Ordering Provider     [facility]  Once        Provider:  (Not yet assigned)    Completed AMEENA, SEEMAL     Inpatient consult to Interventional Cardiology  Once        Provider:  (Not yet assigned)    Completed AMEENA, SEEMAL          No new Assessment & Plan notes have been filed under this hospital service since the last note was generated.  Service: Hospital Medicine    Final Active Diagnoses:    Diagnosis Date Noted POA    PRINCIPAL PROBLEM:  NSTEMI (non-ST elevated myocardial infarction) [I21.4] 09/19/2022 Unknown    Chest pain [R07.9] 10/15/2018 Yes    Acute deep vein thrombosis (DVT) of right upper extremity [I82.621] 07/06/2022 Yes    Morbid obesity [E66.01] 02/18/2021 Yes    Hypokalemia [E87.6] 02/18/2021 Yes    Osteomyelitis of left lower extremity [M86.9] 02/18/2021 Yes    Peripheral arterial disease [I73.9] 12/10/2020 Yes     Chronic    Asymptomatic Mobitz (type) I (Wenckebach's)  atrioventricular block [I44.1] 12/06/2019 Yes    Anemia of chronic disease [D63.8] 10/15/2018 Yes    Chronic kidney disease, stage III (moderate) [N18.30] 10/15/2018 Yes    Type 2 diabetes, uncontrolled, with neuropathy [E11.40, E11.65] 09/23/2014 Yes     Chronic    Essential hypertension [I10] 08/05/2014 Yes     Chronic      Problems Resolved During this Admission:       Discharged Condition: stable    Disposition: Home or Self Care    Follow Up:    Patient Instructions:      Ambulatory referral/consult to Interventional Cardiology   Standing Status: Future   Referral Priority: Routine Referral Type: Consultation   Referral Reason: Specialty Services Required   Referred to Provider: LOVE BRAND Requested Specialty: Interventional Cardiology   Number of Visits Requested: 1       Significant Diagnostic Studies: Labs:   BMP:   Recent Labs   Lab 09/19/22  0933 09/20/22  0445   * 233*   * 138   K 4.4 4.3   CL 99 103   CO2 29 25   BUN 15 15   CREATININE 1.2 1.3   CALCIUM 8.5* 9.2   MG 1.8 1.8   , CMP   Recent Labs   Lab 09/19/22  0933 09/20/22  0445   * 138   K 4.4 4.3   CL 99 103   CO2 29 25   * 233*   BUN 15 15   CREATININE 1.2 1.3   CALCIUM 8.5* 9.2   PROT 6.7 7.2   ALBUMIN 2.4* 2.6*   BILITOT 0.4 0.3   ALKPHOS 71 74   AST 12 12   ALT 8* 7*   ANIONGAP 5* 10   , CBC   Recent Labs   Lab 09/18/22  1538 09/19/22  0933 09/20/22  0445   WBC 5.96 5.24 5.87   HGB 9.6* 8.9* 9.3*   HCT 30.2* 28.3* 28.9*    289 295    and Troponin   Recent Labs   Lab 09/18/22  0958 09/18/22  1153 09/19/22  0933   TROPONINI 0.039* 0.032* 0.007       Pending Diagnostic Studies:     None         Medications:  Reconciled Home Medications:      Medication List      START taking these medications    apixaban 5 mg Tab  Commonly known as: ELIQUIS  Take 1 tablet (5 mg total) by mouth 2 (two) times daily.     clopidogreL 75 mg tablet  Commonly known as: PLAVIX  Take 1 tablet (75 mg total) by mouth once daily.    "     CHANGE how you take these medications    gabapentin 100 MG capsule  Commonly known as: NEURONTIN  Take 2 capsules (200 mg total) by mouth 2 (two) times daily.  What changed:   · how much to take  · when to take this        CONTINUE taking these medications    acetaminophen 500 MG tablet  Commonly known as: TYLENOL  Take 1,000 mg by mouth every 8 (eight) hours as needed for Pain.     ascorbic acid (vitamin C) 500 MG tablet  Commonly known as: VITAMIN C  Take 500 mg by mouth once daily.     aspirin 81 MG EC tablet  Commonly known as: ECOTRIN  Take 1 tablet (81 mg total) by mouth once daily. for 6 days     BD ULTRA-FINE ADITYA PEN NEEDLE 32 gauge x 5/32" Ndle  Generic drug: pen needle, diabetic  USE FOUR TIMES DAILY WITH MEALS AND NIGHTLY     blood sugar diagnostic Strp  Commonly known as: CONTOUR TEST STRIPS  Inject 1 strip into the skin 2 (two) times daily before meals.     hydrALAZINE 25 MG tablet  Commonly known as: APRESOLINE  Take 1 tablet (25 mg total) by mouth 3 (three) times daily.     * insulin aspart U-100 100 unit/mL (3 mL) Inpn pen  Commonly known as: NovoLOG  Inject 0-5 Units into the skin before meals and at bedtime as needed (Hyperglycemia). **LOW CORRECTION DOSE**  Blood Glucose  mg/dL                  Pre-meal                2200  151-200                0 unit                      0 unit  201-250                2 units                    1 unit  251-300                3 units                    1 unit  301-350                4 units                    2 units  >350                     5 units                    3 units  Administer subcutaneously if needed at times designated by monitoring schedule.     * insulin aspart U-100 100 unit/mL (3 mL) Inpn pen  Commonly known as: NovoLOG  Inject 20 Units into the skin 3 (three) times daily with meals.     isosorbide dinitrate 10 MG tablet  Commonly known as: ISORDIL  Take 1 tablet (10 mg total) by mouth 3 (three) times daily.     Lactobacillus rhamnosus GG " 10 billion cell capsule  Commonly known as: CULTURELLE  Take 1 capsule by mouth 2 (two) times daily.     LANTUS SOLOSTAR U-100 INSULIN glargine 100 units/mL SubQ pen  Generic drug: insulin  Inject 45 Units into the skin every evening.     MICROLET LANCET Misc  Generic drug: lancets     NIFEdipine 90 MG (OSM) 24 hr tablet  Commonly known as: PROCARDIA-XL  Take 1 tablet (90 mg total) by mouth once daily.     rosuvastatin 40 MG Tab  Commonly known as: CRESTOR  Take 40 mg by mouth once daily.     tamsulosin 0.4 mg Cap  Commonly known as: FLOMAX  Take 1 capsule (0.4 mg total) by mouth once daily.         * This list has 2 medication(s) that are the same as other medications prescribed for you. Read the directions carefully, and ask your doctor or other care provider to review them with you.            STOP taking these medications    hydroCHLOROthiazide 25 MG tablet  Commonly known as: HYDRODIURIL            Indwelling Lines/Drains at time of discharge:   Lines/Drains/Airways     None                 Time spent on the discharge of patient: 25 minutes         Merlin Kearney MD  Department of Hospital Medicine  [facility] - Tele[facility] Stepdown

## 2022-09-20 NOTE — PLAN OF CARE
Problem: Adult Inpatient Plan of Care  Goal: Plan of Care Review  Outcome: Adequate for Care Transition  Goal: Patient-Specific Goal (Individualized)  Outcome: Adequate for Care Transition  Goal: Absence of Hospital-Acquired Illness or Injury  Outcome: Adequate for Care Transition  Goal: Optimal Comfort and Wellbeing  Outcome: Adequate for Care Transition  Goal: Readiness for Transition of Care  Outcome: Adequate for Care Transition     Problem: Bariatric Environmental Safety  Goal: Safety Maintained with Care  Outcome: Adequate for Care Transition     Problem: Diabetes Comorbidity  Goal: Blood Glucose Level Within Targeted Range  Outcome: Adequate for Care Transition     Problem: Fluid and Electrolyte Imbalance (Acute Kidney Injury/Impairment)  Goal: Fluid and Electrolyte Balance  Outcome: Adequate for Care Transition     Problem: Oral Intake Inadequate (Acute Kidney Injury/Impairment)  Goal: Optimal Nutrition Intake  Outcome: Adequate for Care Transition     Problem: Renal Function Impairment (Acute Kidney Injury/Impairment)  Goal: Effective Renal Function  Outcome: Adequate for Care Transition     Problem: Impaired Wound Healing  Goal: Optimal Wound Healing  Outcome: Adequate for Care Transition     Problem: Skin Injury Risk Increased  Goal: Skin Health and Integrity  Outcome: Adequate for Care Transition

## 2022-09-20 NOTE — PLAN OF CARE
[facility] - Tele[facility] Stepdown  Discharge Final Note    Primary Care Provider: Mart Escalante MD    Expected Discharge Date: 9/20/2022      Future Appointments   Date Time Provider Department Center   9/22/2022  1:00 PM Anuja Farrell DO [facility] W[facility]        Final Discharge Note (most recent)       Final Note - 09/20/22 1521          Final Note    Assessment Type Final Discharge Note     Anticipated Discharge Disposition Home-Health Care Svc     What phone number can be called within the next 1-3 days to see how you are doing after discharge? [phone]     Hospital Resources/Appts/Education Provided Appointments scheduled and added to AVS        Post-Acute Status    Post-Acute Authorization Home Health     Home Health Status Set-up Complete/Auth obtained   [facility]    Discharge Delays None known at this time                     Important Message from Medicare             Contact Info       Mart Escalante MD   Specialty: Family Medicine   Relationship: PCP - General    [address] [place] [address]   Phone: [phone]       Next Steps: Go on 9/23/2022    Instructions: Hospital Follow-up with Dr. Escalante on 9/23/22 at 10:10 AM    [facility] [place]   Specialty: Home Health Services    [address]  [facility] [place] [address]   Phone: [phone]       Next Steps: Follow up    Instructions: Home Health            Barb Brown RN  Ext [phone]

## 2022-09-20 NOTE — PLAN OF CARE
09/20/22 1517   Post-Acute Status   Post-Acute Authorization Home Health   Home Health Status Set-up Complete/Auth obtained  ([facility] (Ph: [phone] / Fax: [phone])- current, accepted. Faxed HH orders.)   Hospital Resources/Appts/Education Provided Appointments scheduled and added to AVS  (PCP appt and HH updated on AVS)     Toshia Dennis LMSW  [facility]  Ext. [phone]

## 2022-09-22 ENCOUNTER — HOSPITAL ENCOUNTER (OUTPATIENT)
Dept: WOUND CARE | Facility: HOSPITAL | Age: 72
Discharge: HOME | End: 2022-09-22
Attending: SURGERY
Payer: MEDICARE

## 2022-09-22 VITALS
DIASTOLIC BLOOD PRESSURE: 62 MMHG | TEMPERATURE: 98 F | SYSTOLIC BLOOD PRESSURE: 138 MMHG | WEIGHT: 274 LBS | HEART RATE: 71 BPM | HEIGHT: 69 IN | BODY MASS INDEX: 40.58 KG/M2

## 2022-09-22 DIAGNOSIS — I27.20 PULMONARY HTN: ICD-10-CM

## 2022-09-22 DIAGNOSIS — R60.0 EDEMA OF LEG: ICD-10-CM

## 2022-09-22 DIAGNOSIS — L97.922 NONHEALING ULCER OF LEFT LOWER LEG WITH FAT LAYER EXPOSED: ICD-10-CM

## 2022-09-22 DIAGNOSIS — M86.672 CHRONIC OSTEOMYELITIS OF ANKLE AND FOOT, LEFT: Primary | ICD-10-CM

## 2022-09-22 DIAGNOSIS — Z01.810 PRE-OPERATIVE CARDIOVASCULAR EXAMINATION, RECENT MI: ICD-10-CM

## 2022-09-22 DIAGNOSIS — I25.10 CORONARY ARTERY DISEASE INVOLVING NATIVE CORONARY ARTERY OF NATIVE HEART WITHOUT ANGINA PECTORIS: ICD-10-CM

## 2022-09-22 PROCEDURE — 11045 DBRDMT SUBQ TISS EACH ADDL: CPT

## 2022-09-22 PROCEDURE — 99213 OFFICE O/P EST LOW 20 MIN: CPT | Mod: 25

## 2022-09-22 PROCEDURE — 11042 DBRDMT SUBQ TIS 1ST 20SQCM/<: CPT

## 2022-09-22 NOTE — PROGRESS NOTES
[facility]    Subjective:       Patient ID: Saji Castañeda is a 72 y.o. male.    Chief Complaint: Diabetic Foot Ulcer and Wound Consult    Readmit to clinic for wounds to bilateral lower extremities. Patient was seen here once before, July 2022, for HBO eval.  He was not sure he would have the transportation for daily tx at that time. Wounds appear stable. Edema noted bilaterally. Dr Farrell assessed patient and debrided wounds. Bilateral ultrasounds ordered. Patient needs cardiac clearance for possible HBO treatments for chronic refractory osteomylitis. Recent NSTEMI noted. Compression therapy along with hydrafera ready started today and orders sent to home health for dressing changes. F/U in 2 weeks. No other c/o.    Review of Systems   All systems were reviewed and are negative, except that mentioned in the HPI.    Objective:      Physical Exam  Constitutional:       Appearance: He is well-developed.   HENT:      Head: Normocephalic and atraumatic.   Eyes:      Pupils: Pupils are equal, round, and reactive to light.   Cardiovascular:      Rate and Rhythm: Normal rate.   Pulmonary:      Effort: Pulmonary effort is normal. No respiratory distress.      Breath sounds: No stridor.   Abdominal:      General: There is no distension.   Musculoskeletal:      Cervical back: Normal range of motion.   Skin:     Comments: See Synopsis for wound details   Neurological:      Mental Status: He is alert and oriented to person, place, and time.          Altered Skin Integrity 09/22/22 0130 Right lower;lateral Leg (Active)   09/22/22 0130   Altered Skin Integrity Present on Admission:    Side: Right   Orientation: lower;lateral   Location: Leg   Wound Number:    Is this injury device related?:    Primary Wound Type:    Description of Altered Skin Integrity:    Ankle-Brachial Index:    Pulses: positive pulses with doppler   Removal Indication and Assessment:    Wound Outcome:     (Retired) Wound Length (cm):    (Retired) Wound Width (cm):    (Retired) Depth (cm):    Wound Description (Comments):    Removal Indications:    Wound Image   09/22/22 1300   Dressing Appearance Intact;Dry 09/22/22 1300   Drainage Amount Scant 09/22/22 1300   Drainage Characteristics/Odor Serosanguineous 09/22/22 1300   Appearance Red;Pink 09/22/22 1300   Tissue loss description Partial thickness 09/22/22 1300   Red (%), Wound Tissue Color 100 % 09/22/22 1300   Periwound Area Dry 09/22/22 1300   Wound Edges Irregular;Other (see comments) 09/22/22 1300   Wound Length (cm) 6 cm 09/22/22 1300   Wound Width (cm) 6.2 cm 09/22/22 1300   Wound Depth (cm) 0.1 cm 09/22/22 1300   Wound Volume (cm^3) 3.72 cm^3 09/22/22 1300   Wound Surface Area (cm^2) 37.2 cm^2 09/22/22 1300   Care Cleansed with:;Sterile normal saline 09/22/22 1300   Dressing Applied;Absorptive Pad;Compression wrap 09/22/22 1300   Compression Two layer compression 09/22/22 1300   Dressing Change Due 09/26/22 09/22/22 1300            Altered Skin Integrity 09/22/22 0130 Left posterior Calf (Active)   09/22/22 0130   Altered Skin Integrity Present on Admission:    Side: Left   Orientation: posterior   Location: Calf   Wound Number:    Is this injury device related?:    Primary Wound Type:    Description of Altered Skin Integrity:    Ankle-Brachial Index:    Pulses: positive with doppler   Removal Indication and Assessment:    Wound Outcome:    (Retired) Wound Length (cm):    (Retired) Wound Width (cm):    (Retired) Depth (cm):    Wound Description (Comments):    Removal Indications:    Wound Image   09/22/22 1300   Drainage Amount Moderate 09/22/22 1300   Drainage Characteristics/Odor Serous 09/22/22 1300   Appearance Red;Yellow 09/22/22 1300   Tissue loss description Full thickness 09/22/22 1300   Red (%), Wound Tissue Color 80 % 09/22/22 1300   Yellow (%), Wound Tissue Color 20 % 09/22/22 1300   Periwound Area Dry;Intact 09/22/22 1300   Wound Edges Defined  09/22/22 1300   Wound Length (cm) 2 cm 09/22/22 1300   Wound Width (cm) 3.5 cm 09/22/22 1300   Wound Depth (cm) 0.5 cm 09/22/22 1300   Wound Volume (cm^3) 3.5 cm^3 09/22/22 1300   Wound Surface Area (cm^2) 7 cm^2 09/22/22 1300   Care Cleansed with:;Sterile normal saline 09/22/22 1300   Dressing Applied;Absorptive Pad;Compression wrap 09/22/22 1300   Compression Two layer compression 09/22/22 1300   Dressing Change Due 09/26/22 09/22/22 1300            (Retired) Wound 09/22/22 1333 Diabetic Ulcer Left Heel (Active)   09/22/22 1333    Pre-existing: Yes   Primary Wound Type: Diabetic ulc   Side: Left   Orientation:    Location: Heel   Wound Number:    Ankle-Brachial Index:    Pulses:    Removal Indication and Assessment:    Wound Outcome:    (Retired) Wound Type:    (Retired) Wound Length (cm):    (Retired) Wound Width (cm):    (Retired) Depth (cm):    Wound Description (Comments):    Removal Indications:    Wound Image   09/22/22 1300   Dressing Appearance Intact;Moist drainage 09/22/22 1300   Drainage Amount Moderate 09/22/22 1300   Drainage Characteristics/Odor Serosanguineous 09/22/22 1300   Appearance Red;Yellow 09/22/22 1300   Tissue loss description Full thickness 09/22/22 1300   Red (%), Wound Tissue Color 80 % 09/22/22 1300   Yellow (%), Wound Tissue Color 20 % 09/22/22 1300   Periwound Area Edematous 09/22/22 1300   Wound Edges Irregular 09/22/22 1300   Wound Length (cm) 5.3 cm 09/22/22 1300   Wound Width (cm) 3.8 cm 09/22/22 1300   Wound Depth (cm) 0.3 cm 09/22/22 1300   Wound Volume (cm^3) 6.042 cm^3 09/22/22 1300   Wound Surface Area (cm^2) 20.14 cm^2 09/22/22 1300   Care Cleansed with:;Sterile normal saline 09/22/22 1300   Dressing Applied;Absorptive Pad;Compression wrap 09/22/22 1300   Periwound Care Absorptive dressing applied 09/22/22 1300   Compression Two layer compression 09/22/22 1300   Off Loading Off loading shoe 09/22/22 1300   Dressing Change Due 09/26/22 09/22/22 1300         Assessment:          ICD-10-CM ICD-9-CM   1. Chronic osteomyelitis of ankle and foot, left  M86.672 730.17   2. Nonhealing ulcer of left lower leg with fat layer exposed  L97.922 707.19   3. Edema of leg  R60.0 782.3   4. Coronary artery disease involving native coronary artery of native heart without angina pectoris  I25.10 414.01   5. Pulmonary HTN  I27.20 416.8   6. Pre-operative cardiovascular examination, recent MI  Z01.810 V72.81    I21.9 410.92         Plan:   Tissue pathology and/or culture taken:  [] Yes [x] No   Sharp debridement performed:   [x] Yes [] No   Labs ordered this visit:   [] Yes [x] No   Imaging ordered this visit:   [] Yes [x] No           Orders Placed This Encounter   Procedures    US Lower Extremity Veins Bilateral Insufficiency     Please remove leg dressings prior to US     Standing Status:   Future     Standing Expiration Date:   9/22/2023     Order Specific Question:   May the Radiologist modify the order per protocol to meet the clinical needs of the patient?     Answer:   Yes     Order Specific Question:   Release to patient     Answer:   Immediate    Ambulatory referral/consult to Cardiology     Standing Status:   Future     Standing Expiration Date:   10/22/2023     Referral Priority:   Routine     Referral Type:   Consultation     Referral Reason:   Specialty Services Required     Requested Specialty:   Cardiology     Number of Visits Requested:   1    Change dressing     Bilateral leg wounds:    Cleanse wound with:Normal Saline  Lidocaine:PRN  Primary dressing:Hydrafera Ready  Secondary dressing:ABD pad  Edema control: Coflex with calomine  Frequency:Mondays, Thursdays and PRN  Follow-up:2 weeks  Home Health:[facility] to change dressing as ordered above.  Other Orders: Bilateral lower extremity ultrasound.      Pt is unsure if he will have transportation for daily HBO treatments.  In addition, pt was referred to [facility] for eval in July and was unable to go. Since then, he has been recently  dc'd from the hospital due to NSTEMI.  F/u appt with cardiology was made for the patient.  This was discussed with the patient. All questions were answered.  Wound care as noted above.  Follow up in about 2 weeks (around 10/6/2022).

## 2022-09-25 PROBLEM — M86.672: Status: ACTIVE | Noted: 2022-09-25

## 2022-09-25 PROBLEM — L97.922: Status: ACTIVE | Noted: 2022-09-25

## 2022-09-25 NOTE — PROCEDURES
"Debridement    Date/Time: 9/22/2022 12:15 PM  Performed by: Anuja Farrell DO  Authorized by: Anuja Farrell DO     Time out: Immediately prior to procedure a "time out" was called to verify the correct patient, procedure, equipment, support staff and site/side marked as required.    Consent Done?:  Yes (Written)  Local anesthesia used?: Yes    Local anesthetic:  Topical anesthetic    Debridement - 1st Wound - General Location: L calf.    Type of Debridement:  Excisional       Length (cm):  2       Area (sq cm):  7       Width (cm):  3.5       Percent Debrided (%):  100       Depth (cm):  0.5       Total Area Debrided (sq cm):  7    Depth of debridement:  Subcutaneous tissue    Tissue debrided:  Subcutaneous    Devitalized tissue debrided:  Slough, Fibrin, Exudate and Biofilm    Instruments:  Curette    2nd Wound Details:     Debridement - 2nd Wound - General Location: L heel.    Type of Debridement:  Excisional       Length (cm):  5.3       Area (sq cm):  20.14       Width (cm):  3.8       Percent Debrided (%):  100       Depth (cm):  0.3       Total Area Debrided (sq cm):  20.14    Depth of debridement:  Subcutaneous tissue    Tissue debrided:  Subcutaneous    Devitalized tissue debrided:  Slough, Fibrin, Exudate and Biofilm    Instruments:  Curette    Bleeding:  Minimal  Hemostasis Achieved: Yes    Method Used:  Pressure  Patient tolerance:  Patient tolerated the procedure well with no immediate complications  "

## 2022-10-06 ENCOUNTER — HOSPITAL ENCOUNTER (OUTPATIENT)
Dept: WOUND CARE | Facility: HOSPITAL | Age: 72
Discharge: HOME | End: 2022-10-06
Attending: SURGERY
Payer: MEDICARE

## 2022-10-06 VITALS
DIASTOLIC BLOOD PRESSURE: 67 MMHG | SYSTOLIC BLOOD PRESSURE: 167 MMHG | BODY MASS INDEX: 40.58 KG/M2 | WEIGHT: 274 LBS | HEIGHT: 69 IN | TEMPERATURE: 98 F | HEART RATE: 40 BPM

## 2022-10-06 DIAGNOSIS — M86.672 CHRONIC OSTEOMYELITIS OF ANKLE AND FOOT, LEFT: Primary | ICD-10-CM

## 2022-10-06 DIAGNOSIS — I25.10 CORONARY ARTERY DISEASE INVOLVING NATIVE CORONARY ARTERY OF NATIVE HEART WITHOUT ANGINA PECTORIS: ICD-10-CM

## 2022-10-06 DIAGNOSIS — R60.0 EDEMA OF LEG: ICD-10-CM

## 2022-10-06 DIAGNOSIS — L97.912 NONHEALING ULCER OF RIGHT LOWER LEG WITH FAT LAYER EXPOSED: ICD-10-CM

## 2022-10-06 DIAGNOSIS — L97.922 NONHEALING ULCER OF LEFT LOWER LEG WITH FAT LAYER EXPOSED: ICD-10-CM

## 2022-10-06 DIAGNOSIS — I27.20 PULMONARY HTN: ICD-10-CM

## 2022-10-06 PROCEDURE — 11042 DBRDMT SUBQ TIS 1ST 20SQCM/<: CPT

## 2022-10-06 PROCEDURE — 97597 DBRDMT OPN WND 1ST 20 CM/<: CPT | Mod: 59

## 2022-10-06 NOTE — PROGRESS NOTES
[facility]    Subjective:       Patient ID: Saji Castañeda is a 72 y.o. male.    Chief Complaint: Wound Care    Patient to wound care center for BLE wounds. He is not sure he can make daily HBOT visits. He did not see cardiology and was marked as a no-show.  No new concerns , continue with current plan of care. We will help him reschedule his cards appt.  Review of Systems      Objective:      Physical Exam  Constitutional:       Appearance: He is well-developed.   HENT:      Head: Normocephalic and atraumatic.   Eyes:      Pupils: Pupils are equal, round, and reactive to light.   Cardiovascular:      Rate and Rhythm: Normal rate.   Pulmonary:      Effort: Pulmonary effort is normal. No respiratory distress.      Breath sounds: No stridor.   Abdominal:      General: There is no distension.   Musculoskeletal:      Cervical back: Normal range of motion.   Skin:     Comments: See Synopsis for wound details   Neurological:      Mental Status: He is alert and oriented to person, place, and time.          Altered Skin Integrity 09/22/22 0130 Right lower;lateral Leg (Active)   09/22/22 0130   Altered Skin Integrity Present on Admission:    Side: Right   Orientation: lower;lateral   Location: Leg   Wound Number:    Is this injury device related?:    Primary Wound Type:    Description of Altered Skin Integrity:    Ankle-Brachial Index:    Pulses: positive pulses with doppler   Removal Indication and Assessment:    Wound Outcome:    (Retired) Wound Length (cm):    (Retired) Wound Width (cm):    (Retired) Depth (cm):    Wound Description (Comments):    Removal Indications:    Wound Image   10/06/22 1622   Dressing Appearance Intact 10/06/22 1622   Drainage Amount None 10/06/22 1622   Appearance Intact 10/06/22 1622   Tissue loss description Partial thickness 10/06/22 1622   Periwound Area Intact;Dry 10/06/22 1622   Wound Edges Irregular 10/06/22 1622   Wound Length (cm) 5.8 cm  10/06/22 1622   Wound Width (cm) 5 cm 10/06/22 1622   Wound Depth (cm) 0.1 cm 10/06/22 1622   Wound Volume (cm^3) 2.9 cm^3 10/06/22 1622   Wound Surface Area (cm^2) 29 cm^2 10/06/22 1622   Care Cleansed with:;Soap and water;Sterile normal saline;Debrided 10/06/22 1622   Dressing Applied;Changed;Hydrofiber;Absorptive Pad;Compression wrap 10/06/22 1622   Compression Four layer compression 10/06/22 1622   Dressing Change Due 10/20/22 10/06/22 1622            Altered Skin Integrity 09/22/22 0130 Left posterior Calf (Active)   09/22/22 0130   Altered Skin Integrity Present on Admission:    Side: Left   Orientation: posterior   Location: Calf   Wound Number:    Is this injury device related?:    Primary Wound Type:    Description of Altered Skin Integrity:    Ankle-Brachial Index:    Pulses: positive with doppler   Removal Indication and Assessment:    Wound Outcome:    (Retired) Wound Length (cm):    (Retired) Wound Width (cm):    (Retired) Depth (cm):    Wound Description (Comments):    Removal Indications:    Wound Image   10/06/22 1622   Drainage Amount Moderate 10/06/22 1622   Drainage Characteristics/Odor Serous 10/06/22 1622   Appearance Intact;Red;Yellow 10/06/22 1622   Tissue loss description Full thickness 10/06/22 1622   Red (%), Wound Tissue Color 80 % 10/06/22 1622   Yellow (%), Wound Tissue Color 20 % 10/06/22 1622   Periwound Area Intact;Dry 10/06/22 1622   Wound Edges Defined 10/06/22 1622   Wound Length (cm) 2 cm 10/06/22 1622   Wound Width (cm) 3.5 cm 10/06/22 1622   Wound Depth (cm) 0.5 cm 10/06/22 1622   Wound Volume (cm^3) 3.5 cm^3 10/06/22 1622   Wound Surface Area (cm^2) 7 cm^2 10/06/22 1622   Care Cleansed with: 10/06/22 1622   Dressing Applied;Changed;Hydrofiber;Compression wrap 10/06/22 1622   Compression Four layer compression 10/06/22 1622   Dressing Change Due 10/20/22 10/06/22 1622            (Retired) Wound 09/22/22 1333 Diabetic Ulcer Left Heel (Active)   09/22/22 1333    Pre-existing: Yes    Primary Wound Type: Diabetic ulc   Side: Left   Orientation:    Location: Heel   Wound Number:    Ankle-Brachial Index:    Pulses:    Removal Indication and Assessment:    Wound Outcome:    (Retired) Wound Type:    (Retired) Wound Length (cm):    (Retired) Wound Width (cm):    (Retired) Depth (cm):    Wound Description (Comments):    Removal Indications:    Dressing Appearance Intact;Moist drainage 10/06/22 1622   Drainage Amount Moderate 10/06/22 1622   Drainage Characteristics/Odor Serosanguineous 10/06/22 1622   Appearance Intact;Red;Yellow 10/06/22 1622   Tissue loss description Full thickness 10/06/22 1622   Red (%), Wound Tissue Color 80 % 10/06/22 1622   Yellow (%), Wound Tissue Color 20 % 10/06/22 1622   Periwound Area Intact;Edematous 10/06/22 1622   Wound Edges Irregular 10/06/22 1622   Wound Length (cm) 5.3 cm 10/06/22 1622   Wound Width (cm) 3.5 cm 10/06/22 1622   Wound Depth (cm) 0.3 cm 10/06/22 1622   Wound Volume (cm^3) 5.565 cm^3 10/06/22 1622   Wound Surface Area (cm^2) 18.55 cm^2 10/06/22 1622   Care Cleansed with:;Soap and water;Sterile normal saline;Debrided 10/06/22 1622   Dressing Applied;Changed;Hydrofiber;Absorptive Pad;Compression wrap 10/06/22 1622   Periwound Care Absorptive dressing applied 10/06/22 1622   Compression Four layer compression 10/06/22 1622   Off Loading Off loading shoe 10/06/22 1622   Dressing Change Due 10/20/22 10/06/22 1622         Assessment:         ICD-10-CM ICD-9-CM   1. Chronic osteomyelitis of ankle and foot, left  M86.672 730.17   2. Nonhealing ulcer of left lower leg with fat layer exposed  L97.922 707.19   3. Nonhealing ulcer of right lower leg with fat layer exposed  L97.912 707.19   4. Edema of leg  R60.0 782.3   5. Coronary artery disease involving native coronary artery of native heart without angina pectoris  I25.10 414.01   6. Pulmonary HTN  I27.20 416.8         Plan:   Tissue pathology and/or culture taken:  [] Yes [x] No   Sharp debridement performed:    [x] Yes [] No   Labs ordered this visit:   [] Yes [x] No   Imaging ordered this visit:   [x] Yes [] No           Orders Placed This Encounter   Procedures    Change dressing     Bilateral leg wounds:     Cleanse wound with: Normal Saline   Lidocaine: PRN   Primary dressing: Hydrofera Ready   Secondary dressing: ABD pad or Mextra  Edema control: Profore 4-layer dressing toes to knee  Frequency: Mondays, Thursdays and PRN   Follow-up: 2 weeks   Home Health: [facility] to change dressing as ordered above.   Other Orders: Bilateral lower extremity ultrasound.      B/l LE US to eval for venous insufficiency are pending.  Cardiology f/u pending  Follow up in about 2 weeks (around 10/20/2022).

## 2022-10-09 PROBLEM — L97.912 NONHEALING ULCER OF RIGHT LOWER LEG WITH FAT LAYER EXPOSED: Status: ACTIVE | Noted: 2022-10-09

## 2022-10-10 NOTE — PROCEDURES
"Debridement    Date/Time: 10/6/2022 1:54 PM  Performed by: Anuja Farrell DO  Authorized by: Anuja Farrell DO     Time out: Immediately prior to procedure a "time out" was called to verify the correct patient, procedure, equipment, support staff and site/side marked as required.    Consent Done?:  Yes (Written)  Local anesthesia used?: Yes    Local anesthetic:  Topical anesthetic    Debridement - 1st Wound - General Location: L calf.    Type of Debridement:  Excisional       Length (cm):  2       Area (sq cm):  7       Width (cm):  3.5       Percent Debrided (%):  100       Depth (cm):  0.5       Total Area Debrided (sq cm):  7    Depth of debridement:  Subcutaneous tissue    Tissue debrided:  Subcutaneous    Devitalized tissue debrided:  Slough, Fibrin, Exudate and Biofilm    Instruments:  Curette    2nd Wound Details:     Debridement - 2nd Wound - General Location: L heel.    Type of Debridement:  Excisional       Length (cm):  5.3       Area (sq cm):  18.55       Width (cm):  3.5       Percent Debrided (%):  50       Depth (cm):  0.3       Total Area Debrided (sq cm):  9.28    Depth of debridement:  Epidermis/Dermis    Tissue debrided:  Epidermis and Dermis    Devitalized tissue debrided:  Callus    Instruments:  Curette    Bleeding:  Minimal  Hemostasis Achieved: Yes    Method Used:  Pressure  "

## 2022-10-20 ENCOUNTER — HOSPITAL ENCOUNTER (OUTPATIENT)
Dept: WOUND CARE | Facility: HOSPITAL | Age: 72
Discharge: HOME | End: 2022-10-20
Attending: SURGERY
Payer: MEDICARE

## 2022-10-20 VITALS
BODY MASS INDEX: 40.58 KG/M2 | DIASTOLIC BLOOD PRESSURE: 61 MMHG | TEMPERATURE: 99 F | WEIGHT: 274 LBS | SYSTOLIC BLOOD PRESSURE: 135 MMHG | HEART RATE: 52 BPM | HEIGHT: 69 IN

## 2022-10-20 DIAGNOSIS — L97.922 NONHEALING ULCER OF LEFT LOWER LEG WITH FAT LAYER EXPOSED: Primary | ICD-10-CM

## 2022-10-20 DIAGNOSIS — L97.912 NONHEALING ULCER OF RIGHT LOWER LEG WITH FAT LAYER EXPOSED: ICD-10-CM

## 2022-10-20 PROCEDURE — 11042 DBRDMT SUBQ TIS 1ST 20SQCM/<: CPT

## 2022-10-20 PROCEDURE — 11045 DBRDMT SUBQ TISS EACH ADDL: CPT

## 2022-10-20 NOTE — PROGRESS NOTES
[facility]    Subjective:       Patient ID: Saji Castañeda is a 72 y.o. male.    Chief Complaint: No chief complaint on file.    LLE posterior leg debrided. Continue POC. Patient did not have ultrasounds done today due to transportation. Rescheduled for next visit. Orders sent to .  Review of Systems      Objective:      Physical Exam  Constitutional:       Appearance: He is well-developed.   HENT:      Head: Normocephalic and atraumatic.   Eyes:      Pupils: Pupils are equal, round, and reactive to light.   Cardiovascular:      Rate and Rhythm: Normal rate.   Pulmonary:      Effort: Pulmonary effort is normal. No respiratory distress.      Breath sounds: No stridor.   Abdominal:      General: There is no distension.   Musculoskeletal:      Cervical back: Normal range of motion.   Skin:     Comments: See Synopsis for wound details   Neurological:      Mental Status: He is alert and oriented to person, place, and time.          Altered Skin Integrity 09/22/22 0130 Right lower;lateral Leg (Active)   09/22/22 0130   Altered Skin Integrity Present on Admission:    Side: Right   Orientation: lower;lateral   Location: Leg   Wound Number:    Is this injury device related?:    Primary Wound Type:    Description of Altered Skin Integrity:    Ankle-Brachial Index:    Pulses: positive pulses with doppler   Removal Indication and Assessment:    Wound Outcome:    (Retired) Wound Length (cm):    (Retired) Wound Width (cm):    (Retired) Depth (cm):    Wound Description (Comments):    Removal Indications:    Wound Image    10/20/22 1606   Dressing Appearance Moist drainage 10/20/22 1606   Drainage Amount Moderate 10/20/22 1606   Drainage Characteristics/Odor Serosanguineous 10/20/22 1606   Appearance Red;Moist 10/20/22 1606   Tissue loss description Partial thickness 10/20/22 1606   Red (%), Wound Tissue Color 100 % 10/20/22 1606   Periwound Area Edematous;Hemosiderin Staining;Dry  10/20/22 1606   Wound Edges Irregular 10/20/22 1606   Wound Length (cm) 7 cm 10/20/22 1606   Wound Width (cm) 5 cm 10/20/22 1606   Wound Depth (cm) 0.1 cm 10/20/22 1606   Wound Volume (cm^3) 3.5 cm^3 10/20/22 1606   Wound Surface Area (cm^2) 35 cm^2 10/20/22 1606   Care Cleansed with:;Sterile normal saline 10/20/22 1606   Dressing Applied;Hydrofiber;Compression wrap;Absorptive Pad 10/20/22 1606   Periwound Care Moisturizer applied 10/20/22 1606   Compression Four layer compression 10/20/22 1606   Off Loading Off loading shoe 10/20/22 1606   Dressing Change Due 10/24/22 10/20/22 1606            Altered Skin Integrity 09/22/22 0130 Left posterior Calf (Active)   09/22/22 0130   Altered Skin Integrity Present on Admission:    Side: Left   Orientation: posterior   Location: Calf   Wound Number:    Is this injury device related?:    Primary Wound Type:    Description of Altered Skin Integrity:    Ankle-Brachial Index:    Pulses: positive with doppler   Removal Indication and Assessment:    Wound Outcome:    (Retired) Wound Length (cm):    (Retired) Wound Width (cm):    (Retired) Depth (cm):    Wound Description (Comments):    Removal Indications:    Drainage Amount Moderate 10/20/22 1606   Drainage Characteristics/Odor Serosanguineous 10/20/22 1606   Appearance Red;Yellow;Moist 10/20/22 1606   Tissue loss description Full thickness 10/20/22 1606   Red (%), Wound Tissue Color 80 % 10/20/22 1606   Yellow (%), Wound Tissue Color 20 % 10/20/22 1606   Periwound Area Dry;Intact;Edematous;Hemosiderin Staining 10/20/22 1606   Wound Edges Irregular 10/20/22 1606   Wound Length (cm) 3.5 cm 10/20/22 1606   Wound Width (cm) 3 cm 10/20/22 1606   Wound Depth (cm) 0.3 cm 10/20/22 1606   Wound Volume (cm^3) 3.15 cm^3 10/20/22 1606   Wound Surface Area (cm^2) 10.5 cm^2 10/20/22 1606   Care Cleansed with:;Sterile normal saline;Debrided 10/20/22 1606   Dressing Applied;Hydrofiber;Compression wrap;Absorptive Pad 10/20/22 1606   Periwound  Care Absorptive dressing applied 10/20/22 1606   Compression Four layer compression 10/20/22 1606   Dressing Change Due 10/24/22 10/20/22 1606            (Retired) Wound 09/22/22 1333 Diabetic Ulcer Left Heel (Active)   09/22/22 1333    Pre-existing: Yes   Primary Wound Type: Diabetic ulc   Side: Left   Orientation:    Location: Heel   Wound Number:    Ankle-Brachial Index:    Pulses:    Removal Indication and Assessment:    Wound Outcome:    (Retired) Wound Type:    (Retired) Wound Length (cm):    (Retired) Wound Width (cm):    (Retired) Depth (cm):    Wound Description (Comments):    Removal Indications:    Wound Image   10/20/22 1606   Dressing Appearance Moist drainage 10/20/22 1606   Drainage Amount Moderate 10/20/22 1606   Drainage Characteristics/Odor Serosanguineous 10/20/22 1606   Appearance Red;Moist 10/20/22 1606   Tissue loss description Full thickness 10/20/22 1606   Red (%), Wound Tissue Color 100 % 10/20/22 1606   Periwound Area Edematous;Hemosiderin Staining 10/20/22 1606   Wound Edges Irregular 10/20/22 1606   Peralta Classification (diabetic foot ulcers only) Grade 2 10/20/22 1606   Wound Length (cm) 6 cm 10/20/22 1606   Wound Width (cm) 3.5 cm 10/20/22 1606   Wound Depth (cm) 0.3 cm 10/20/22 1606   Wound Volume (cm^3) 6.3 cm^3 10/20/22 1606   Wound Surface Area (cm^2) 21 cm^2 10/20/22 1606   Care Cleansed with:;Sterile normal saline 10/20/22 1606   Dressing Applied;Hydrofiber;Absorptive Pad;Compression wrap 10/20/22 1606   Periwound Care Absorptive dressing applied 10/20/22 1606   Compression Four layer compression 10/20/22 1606   Dressing Change Due 10/24/22 10/20/22 1606         Assessment/Plan:         ICD-10-CM ICD-9-CM   1. Nonhealing ulcer of left lower leg with fat layer exposed  L97.922 707.19   2. Nonhealing ulcer of right lower leg with fat layer exposed  L97.912 707.19           Tissue pathology and/or culture taken:  [] Yes [x] No   Sharp debridement performed:   [x] Yes [] No   Labs  ordered this visit:   [] Yes [x] No   Imaging ordered this visit:   [x] Yes [] No           Orders Placed This Encounter   Procedures    Change dressing     Bilateral leg wounds:     Cleanse wound with: Normal Saline   Lidocaine: PRN   Primary dressing: Hydrofera Ready   Secondary dressing: ABD pad or Mextra   Edema control: Profore 4-layer dressing toes to knee   Frequency: Mondays, Thursdays and PRN   Follow-up: 2 weeks 11/3/22  Home Health: [facility] to change dressing as ordered above.   Other Orders: Bilateral lower extremity ultrasound scheduled for next visit.        Follow up in about 2 weeks (around 11/3/2022).

## 2022-10-24 NOTE — PROCEDURES
"Debridement    Date/Time: 10/20/2022 2:00 PM  Performed by: Anuja Farrell DO  Authorized by: Anuja Farrell DO     Time out: Immediately prior to procedure a "time out" was called to verify the correct patient, procedure, equipment, support staff and site/side marked as required.    Consent Done?:  Yes (Written)  Local anesthesia used?: Yes    Local anesthetic:  Topical anesthetic    Debridement - 1st Wound - General Location: L lat leg.    Type of Debridement:  Excisional       Length (cm):  7       Area (sq cm):  35       Width (cm):  5       Percent Debrided (%):  100       Depth (cm):  0.1       Total Area Debrided (sq cm):  35    Depth of debridement:  Subcutaneous tissue    Tissue debrided:  Subcutaneous    Devitalized tissue debrided:  Slough, Fibrin and Biofilm    Instruments:  Curette    2nd Wound Details:     Debridement - 2nd Wound - General Location: L calf.    Type of Debridement:  Excisional    Depth of debridement:  Subcutaneous tissue    Tissue debrided:  Subcutaneous    Devitalized tissue debrided:  Slough, Fibrin, Exudate and Biofilm    Instruments:  Curette    3rd Wound Details:     Debridement - 3rd Wound - General Location: L heel.    Type of Debridement:  Excisional       Length (cm):  6       Area (sq cm):  21       Width (cm):  3.5       Percent Debrided (%):  100       Depth (cm):  0.3       Total Area Debrided (sq cm):  21    Depth of debridement:  Subcutaneous tissue    Tissue debrided:  Subcutaneous    Devitalized tissue debrided:  Slough, Clots and Biofilm    Instruments:  Curette    Bleeding:  Minimal  Hemostasis Achieved: Yes    Method Used:  Pressure  Patient tolerance:  Patient tolerated the procedure well with no immediate complications  "

## 2022-11-03 ENCOUNTER — HOSPITAL ENCOUNTER (OUTPATIENT)
Dept: WOUND CARE | Facility: HOSPITAL | Age: 72
Discharge: HOME | End: 2022-11-03
Attending: SURGERY
Payer: MEDICARE

## 2022-11-03 ENCOUNTER — HOSPITAL ENCOUNTER (OUTPATIENT)
Dept: RADIOLOGY | Facility: HOSPITAL | Age: 72
Discharge: HOME | End: 2022-11-03
Attending: SURGERY
Payer: MEDICARE

## 2022-11-03 DIAGNOSIS — L92.9 HYPERGRANULATION: ICD-10-CM

## 2022-11-03 DIAGNOSIS — M86.672 CHRONIC OSTEOMYELITIS OF ANKLE AND FOOT, LEFT: ICD-10-CM

## 2022-11-03 DIAGNOSIS — I25.10 CORONARY ARTERY DISEASE INVOLVING NATIVE CORONARY ARTERY OF NATIVE HEART WITHOUT ANGINA PECTORIS: ICD-10-CM

## 2022-11-03 DIAGNOSIS — L97.922 NONHEALING ULCER OF LEFT LOWER LEG WITH FAT LAYER EXPOSED: ICD-10-CM

## 2022-11-03 DIAGNOSIS — I27.20 PULMONARY HTN: ICD-10-CM

## 2022-11-03 DIAGNOSIS — R60.0 EDEMA OF LEG: ICD-10-CM

## 2022-11-03 DIAGNOSIS — L97.912 NONHEALING ULCER OF RIGHT LOWER LEG WITH FAT LAYER EXPOSED: ICD-10-CM

## 2022-11-03 DIAGNOSIS — L97.222 SKIN ULCER OF LEFT CALF WITH FAT LAYER EXPOSED: ICD-10-CM

## 2022-11-03 DIAGNOSIS — I87.2 VENOUS REFLUX: Primary | ICD-10-CM

## 2022-11-03 DIAGNOSIS — L97.212 SKIN ULCER OF RIGHT CALF WITH FAT LAYER EXPOSED: ICD-10-CM

## 2022-11-03 PROCEDURE — 93970 US LOWER EXTREMITY VEINS BILATERAL INSUFFICIENCY: ICD-10-PCS | Mod: 26,,, | Performed by: RADIOLOGY

## 2022-11-03 PROCEDURE — 11042 DBRDMT SUBQ TIS 1ST 20SQCM/<: CPT

## 2022-11-03 PROCEDURE — 93970 EXTREMITY STUDY: CPT | Mod: 26,,, | Performed by: RADIOLOGY

## 2022-11-03 PROCEDURE — 17250 CHEM CAUT OF GRANLTJ TISSUE: CPT

## 2022-11-03 PROCEDURE — 93970 EXTREMITY STUDY: CPT | Mod: TC

## 2022-11-03 NOTE — PROGRESS NOTES
[facility]    Subjective:       Patient ID: Saji Castañeda is a 72 y.o. male.    Chief Complaint: Non-healing Wound Follow Up    Follow for ble wounds. Left posterior leg wound debrided, silver nitrate x 1 to left heel for hypergranulation tissue. Ultrasound results discussed with patient and new plan of care initiated. Dr. Wu was not available to see the patient today. Follow up 2 weeks with  and .     Review of Systems   All systems were reviewed and are negative, except that mentioned in the HPI.    Objective:      Physical Exam  Constitutional:       Appearance: He is well-developed.   HENT:      Head: Normocephalic and atraumatic.   Eyes:      Pupils: Pupils are equal, round, and reactive to light.   Cardiovascular:      Rate and Rhythm: Normal rate.   Pulmonary:      Effort: Pulmonary effort is normal. No respiratory distress.      Breath sounds: No stridor.   Abdominal:      General: There is no distension.   Musculoskeletal:      Cervical back: Normal range of motion.   Skin:     Comments: See Synopsis for wound details   Neurological:      Mental Status: He is alert and oriented to person, place, and time.          Altered Skin Integrity 09/22/22 0130 Right lower;lateral Leg (Active)   09/22/22 0130   Altered Skin Integrity Present on Admission:    Side: Right   Orientation: lower;lateral   Location: Leg   Wound Number:    Is this injury device related?:    Primary Wound Type:    Description of Altered Skin Integrity:    Ankle-Brachial Index:    Pulses: positive pulses with doppler   Removal Indication and Assessment:    Wound Outcome:    (Retired) Wound Length (cm):    (Retired) Wound Width (cm):    (Retired) Depth (cm):    Wound Description (Comments):    Removal Indications:    Wound Image   11/03/22 1450   Dressing Appearance Intact;Moist drainage 11/03/22 1450   Drainage Amount Moderate 11/03/22 1450   Drainage  Characteristics/Odor Serosanguineous 11/03/22 1450   Appearance Pink;Moist;Epithelialization 11/03/22 1450   Tissue loss description Partial thickness 11/03/22 1450   Red (%), Wound Tissue Color 100 % 11/03/22 1450   Periwound Area Edematous;Hemosiderin Staining;Dry 11/03/22 1450   Wound Edges Irregular 11/03/22 1450   Wound Length (cm) 6.5 cm 11/03/22 1450   Wound Width (cm) 4.8 cm 11/03/22 1450   Wound Depth (cm) 0.1 cm 11/03/22 1450   Wound Volume (cm^3) 3.12 cm^3 11/03/22 1450   Wound Surface Area (cm^2) 31.2 cm^2 11/03/22 1450   Care Cleansed with:;Sterile normal saline 11/03/22 1450   Dressing Applied;Absorptive Pad;Compression wrap;Silver;Calcium alginate 11/03/22 1450   Periwound Care Absorptive dressing applied;Dry periwound area maintained 11/03/22 1450   Compression Four layer compression 11/03/22 1450   Off Loading Off loading shoe 11/03/22 1450   Dressing Change Due 11/07/22 11/03/22 1450            Altered Skin Integrity 09/22/22 0130 Left posterior Calf (Active)   09/22/22 0130   Altered Skin Integrity Present on Admission:    Side: Left   Orientation: posterior   Location: Calf   Wound Number:    Is this injury device related?:    Primary Wound Type:    Description of Altered Skin Integrity:    Ankle-Brachial Index:    Pulses: positive with doppler   Removal Indication and Assessment:    Wound Outcome:    (Retired) Wound Length (cm):    (Retired) Wound Width (cm):    (Retired) Depth (cm):    Wound Description (Comments):    Removal Indications:    Wound Image   11/03/22 1450   Dressing Appearance Intact;Moist drainage 11/03/22 1450   Drainage Amount Moderate 11/03/22 1450   Drainage Characteristics/Odor Serosanguineous 11/03/22 1450   Appearance Red;Yellow;Moist 11/03/22 1450   Tissue loss description Full thickness 11/03/22 1450   Red (%), Wound Tissue Color 80 % 11/03/22 1450   Yellow (%), Wound Tissue Color 20 % 11/03/22 1450   Periwound Area Macerated;Hemosiderin Staining;Edematous 11/03/22 9569    Wound Edges Irregular 11/03/22 1450   Wound Length (cm) 3.4 cm 11/03/22 1450   Wound Width (cm) 3 cm 11/03/22 1450   Wound Depth (cm) 0.3 cm 11/03/22 1450   Wound Volume (cm^3) 3.06 cm^3 11/03/22 1450   Wound Surface Area (cm^2) 10.2 cm^2 11/03/22 1450   Care Cleansed with:;Sterile normal saline;Debrided 11/03/22 1450   Dressing Applied;Absorptive Pad;Compression wrap;Silver;Calcium alginate 11/03/22 1450   Periwound Care Absorptive dressing applied 11/03/22 1450   Compression Four layer compression 11/03/22 1450   Dressing Change Due 11/07/22 11/03/22 1450            (Retired) Wound 09/22/22 1333 Diabetic Ulcer Left Heel (Active)   09/22/22 1333    Pre-existing: Yes   Primary Wound Type: Diabetic ulc   Side: Left   Orientation:    Location: Heel   Wound Number:    Ankle-Brachial Index:    Pulses:    Removal Indication and Assessment:    Wound Outcome:    (Retired) Wound Type:    (Retired) Wound Length (cm):    (Retired) Wound Width (cm):    (Retired) Depth (cm):    Wound Description (Comments):    Removal Indications:    Wound Image   11/03/22 1450   Dressing Appearance Intact;Moist drainage 11/03/22 1450   Drainage Amount Moderate 11/03/22 1450   Drainage Characteristics/Odor Serosanguineous 11/03/22 1450   Appearance Red;Moist;Hypergranulation 11/03/22 1450   Tissue loss description Full thickness 11/03/22 1450   Red (%), Wound Tissue Color 100 % 11/03/22 1450   Periwound Area Edematous;Hemosiderin Staining;Macerated 11/03/22 1450   Wound Edges Irregular 11/03/22 1450   Wound Length (cm) 5.8 cm 11/03/22 1450   Wound Width (cm) 3.5 cm 11/03/22 1450   Wound Depth (cm) 0.3 cm 11/03/22 1450   Wound Volume (cm^3) 6.09 cm^3 11/03/22 1450   Wound Surface Area (cm^2) 20.3 cm^2 11/03/22 1450   Care Cleansed with:;Sterile normal saline 11/03/22 1450   Dressing Applied;Absorptive Pad;Cast padding;Compression wrap;Silver;Calcium alginate 11/03/22 1450   Periwound Care Absorptive dressing applied 11/03/22 1450   Compression  Four layer compression 11/03/22 1450   Dressing Change Due 11/07/22 11/03/22 1450     US BLE venous insufficiency 11/3/2022:  Report was discussed with patient.  Impression:   1. There is evidence of hemodynamically significant venous reflux within the right lesser saphenous vein.  2. There is no evidence of bilateral lower extremity deep venous thrombosis.         Assessment/Plan:         ICD-10-CM ICD-9-CM   1. Venous reflux  I87.2 459.81   2. Nonhealing ulcer of left lower leg with fat layer exposed  L97.922 707.19   3. Nonhealing ulcer of right lower leg with fat layer exposed  L97.912 707.19   4. Chronic osteomyelitis of ankle and foot, left  M86.672 730.17   5. Edema of leg  R60.0 782.3   6. Coronary artery disease involving native coronary artery of native heart without angina pectoris  I25.10 414.01   7. Pulmonary HTN  I27.20 416.8   8. Skin ulcer of left calf with fat layer exposed  L97.222 707.12   9. Skin ulcer of right calf with fat layer exposed  L97.212 707.12           Tissue pathology and/or culture taken:  [] Yes [x] No   Sharp debridement performed:   [x] Yes [] No   Labs ordered this visit:   [] Yes [x] No   Imaging ordered this visit:   [] Yes [x] No           Orders Placed This Encounter   Procedures    Ambulatory referral/consult to Interventional Cardiology     Standing Status:   Future     Standing Expiration Date:   12/3/2023     Referral Priority:   Routine     Referral Type:   Consultation     Referral Reason:   Specialty Services Required     Referred to Provider:   Cesar Wu MD     Requested Specialty:   Interventional Cardiology     Number of Visits Requested:   1    Change dressing     Bilateral leg wounds:     Cleanse wound with: Normal Saline   Lidocaine: PRN   Primary dressing:Calcium Alginate AG to wounds.   Secondary dressing: ABD pad or Mextra   Edema control: Profore 4-layer dressing toes to knee   Frequency: Mondays, Thursdays and PRN   Follow-up: 2 weeks 11/15/22   Bud and    Home Health: [facility] to change dressing as ordered above.      Silver nitrate x 1 applied to L heel hypergranulation tissue.  Left calf wound debrided.  US bill insuf report discussed. No reflux LLE. Reflex present RLE.  Follow up in about 2 weeks (around 11/17/2022) for  and .

## 2022-11-06 PROBLEM — L92.9 HYPERGRANULATION: Status: ACTIVE | Noted: 2022-11-06

## 2022-11-06 NOTE — PROCEDURES
"Debridement    Date/Time: 11/3/2022 1:49 PM  Performed by: Anuja Farrell DO  Authorized by: Anuja Farrell DO     Time out: Immediately prior to procedure a "time out" was called to verify the correct patient, procedure, equipment, support staff and site/side marked as required.    Consent Done?:  Yes (Written)  Local anesthesia used?: Yes    Local anesthetic:  Topical anesthetic    Debridement - 1st Wound - General Location: L calf.    Type of Debridement:  Excisional       Length (cm):  3.4       Area (sq cm):  10.2       Width (cm):  3       Percent Debrided (%):  100       Depth (cm):  0.3       Total Area Debrided (sq cm):  10.2    Depth of debridement:  Subcutaneous tissue    Tissue debrided:  Subcutaneous    Devitalized tissue debrided:  Slough, Fibrin and Biofilm    Instruments:  Curette    Bleeding:  Minimal  Hemostasis Achieved: Yes    Method Used:  Pressure  Patient tolerance:  Patient tolerated the procedure well with no immediate complications  "

## 2022-12-06 ENCOUNTER — HOSPITAL ENCOUNTER (OUTPATIENT)
Dept: WOUND CARE | Facility: HOSPITAL | Age: 72
Discharge: HOME | End: 2022-12-06
Attending: SURGERY
Payer: MEDICARE

## 2022-12-06 ENCOUNTER — TELEPHONE (OUTPATIENT)
Dept: WOUND CARE | Facility: HOSPITAL | Age: 72
End: 2022-12-06

## 2022-12-06 VITALS
WEIGHT: 274 LBS | HEIGHT: 69 IN | HEART RATE: 79 BPM | TEMPERATURE: 98 F | SYSTOLIC BLOOD PRESSURE: 166 MMHG | DIASTOLIC BLOOD PRESSURE: 77 MMHG | BODY MASS INDEX: 40.58 KG/M2

## 2022-12-06 DIAGNOSIS — L97.222 SKIN ULCER OF LEFT CALF WITH FAT LAYER EXPOSED: ICD-10-CM

## 2022-12-06 DIAGNOSIS — L92.9 HYPERGRANULATION: ICD-10-CM

## 2022-12-06 DIAGNOSIS — L97.922 NONHEALING ULCER OF LEFT LOWER LEG WITH FAT LAYER EXPOSED: Primary | ICD-10-CM

## 2022-12-06 DIAGNOSIS — E66.01 SEVERE OBESITY (BMI >= 40): ICD-10-CM

## 2022-12-06 DIAGNOSIS — R60.0 EDEMA OF LEG: Chronic | ICD-10-CM

## 2022-12-06 DIAGNOSIS — E11.49 OTHER DIABETIC NEUROLOGICAL COMPLICATION ASSOCIATED WITH TYPE 2 DIABETES MELLITUS: Chronic | ICD-10-CM

## 2022-12-06 DIAGNOSIS — L97.212 SKIN ULCER OF RIGHT CALF WITH FAT LAYER EXPOSED: ICD-10-CM

## 2022-12-06 DIAGNOSIS — L97.912 NONHEALING ULCER OF RIGHT LOWER LEG WITH FAT LAYER EXPOSED: ICD-10-CM

## 2022-12-06 DIAGNOSIS — I87.2 VENOUS REFLUX: ICD-10-CM

## 2022-12-06 PROCEDURE — 17250 CHEM CAUT OF GRANLTJ TISSUE: CPT

## 2022-12-06 PROCEDURE — 29581 APPL MULTLAYER CMPRN SYS LEG: CPT | Mod: GZ

## 2022-12-06 NOTE — TELEPHONE ENCOUNTER
Spoke to Missy with [facility] in regards to bilateral knee high CircAid wraps prescription. Missy informed me that insurance does not reimburse for CircAids, other than [employer]. For patient's wanting to purchase CircAid wraps, the out of pocket expense is $130 per wrap.

## 2022-12-06 NOTE — PROGRESS NOTES
[facility]    Subjective:       Patient ID: Saji Castañeda is a 72 y.o. male.    Chief Complaint: Venous Ulcer    To wound care for ble wounds. Edema improved. Wounds improving.  Silver Nitrate x 1 applied to left heel wound for hypergranulation tissue. No new c/o. Discussed compression stockings vs circaids when wounds have healed. Continued with plan of care as ordered.     Review of Systems   All systems were reviewed and are negative, except that mentioned in the HPI.    Objective:      Physical Exam  Constitutional:       Appearance: He is well-developed.   HENT:      Head: Normocephalic and atraumatic.   Eyes:      Pupils: Pupils are equal, round, and reactive to light.   Cardiovascular:      Rate and Rhythm: Normal rate.   Pulmonary:      Effort: Pulmonary effort is normal. No respiratory distress.      Breath sounds: No stridor.   Abdominal:      General: There is no distension.   Musculoskeletal:      Cervical back: Normal range of motion.   Skin:     Comments: See Synopsis for wound details   Neurological:      Mental Status: He is alert and oriented to person, place, and time.          Altered Skin Integrity 09/22/22 0130 Right lower;lateral Leg (Active)   09/22/22 0130   Altered Skin Integrity Present on Admission:    Side: Right   Orientation: lower;lateral   Location: Leg   Wound Number:    Is this injury device related?:    Primary Wound Type:    Description of Altered Skin Integrity:    Ankle-Brachial Index:    Pulses: positive pulses with doppler   Removal Indication and Assessment:    Wound Outcome:    (Retired) Wound Length (cm):    (Retired) Wound Width (cm):    (Retired) Depth (cm):    Wound Description (Comments):    Removal Indications:    Wound Image    12/06/22 1348   Dressing Appearance Intact;Moist drainage 12/06/22 1348   Drainage Amount Scant 12/06/22 1348   Drainage Characteristics/Odor Serosanguineous 12/06/22 1348   Appearance  Red;Moist 12/06/22 1348   Tissue loss description Partial thickness 12/06/22 1348   Red (%), Wound Tissue Color 100 % 12/06/22 1348   Periwound Area Edematous;Hemosiderin Staining;Satellite lesion 12/06/22 1348   Wound Edges Defined 12/06/22 1348   Wound Length (cm) 0.6 cm 12/06/22 1348   Wound Width (cm) 0.4 cm 12/06/22 1348   Wound Depth (cm) 0.1 cm 12/06/22 1348   Wound Volume (cm^3) 0.024 cm^3 12/06/22 1348   Wound Surface Area (cm^2) 0.24 cm^2 12/06/22 1348   Care Cleansed with:;Soap and water 12/06/22 1348   Dressing Applied;Silver;Calcium alginate;Compression wrap;Absorptive Pad 12/06/22 1348   Periwound Care Absorptive dressing applied;Moisturizer applied 12/06/22 1348   Compression Four layer compression 12/06/22 1348   Off Loading Off loading shoe 12/06/22 1348   Dressing Change Due 12/08/22 12/06/22 1348            Altered Skin Integrity 09/22/22 0130 Left posterior Calf (Active)   09/22/22 0130   Altered Skin Integrity Present on Admission:    Side: Left   Orientation: posterior   Location: Calf   Wound Number:    Is this injury device related?:    Primary Wound Type:    Description of Altered Skin Integrity:    Ankle-Brachial Index:    Pulses: positive with doppler   Removal Indication and Assessment:    Wound Outcome:    (Retired) Wound Length (cm):    (Retired) Wound Width (cm):    (Retired) Depth (cm):    Wound Description (Comments):    Removal Indications:    Wound Image   12/06/22 1348   Dressing Appearance Intact;Moist drainage 12/06/22 1348   Drainage Amount Small 12/06/22 1348   Drainage Characteristics/Odor Serosanguineous 12/06/22 1348   Appearance Red;Moist 12/06/22 1348   Tissue loss description Full thickness 12/06/22 1348   Red (%), Wound Tissue Color 100 % 12/06/22 1348   Periwound Area Hemosiderin Staining;Edematous;Pink 12/06/22 1348   Wound Edges Irregular 12/06/22 1348   Wound Length (cm) 2.2 cm 12/06/22 1348   Wound Width (cm) 1.5 cm 12/06/22 1348   Wound Depth (cm) 0.3 cm 12/06/22  1348   Wound Volume (cm^3) 0.99 cm^3 12/06/22 1348   Wound Surface Area (cm^2) 3.3 cm^2 12/06/22 1348   Care Cleansed with:;Soap and water 12/06/22 1348   Dressing Applied;Silver;Calcium alginate;Absorptive Pad;Compression wrap 12/06/22 1348   Periwound Care Absorptive dressing applied;Moisturizer applied 12/06/22 1348   Compression Four layer compression 12/06/22 1348   Dressing Change Due 12/08/22 12/06/22 1348            (Retired) Wound 09/22/22 1333 Diabetic Ulcer Left Heel (Active)   09/22/22 1333    Pre-existing: Yes   Primary Wound Type: Diabetic ulc   Side: Left   Orientation:    Location: Heel   Wound Number:    Ankle-Brachial Index:    Pulses:    Removal Indication and Assessment:    Wound Outcome:    (Retired) Wound Type:    (Retired) Wound Length (cm):    (Retired) Wound Width (cm):    (Retired) Depth (cm):    Wound Description (Comments):    Removal Indications:    Wound Image     12/06/22 1348   Dressing Appearance Intact;Moist drainage 12/06/22 1348   Drainage Amount Small 12/06/22 1348   Drainage Characteristics/Odor Serosanguineous 12/06/22 1348   Appearance Red;Moist;Hypergranulation 12/06/22 1348   Tissue loss description Full thickness 12/06/22 1348   Red (%), Wound Tissue Color 100 % 12/06/22 1348   Periwound Area Edematous;Pink 12/06/22 1348   Wound Edges Irregular 12/06/22 1348   Wound Length (cm) 5 cm 12/06/22 1348   Wound Width (cm) 2.7 cm 12/06/22 1348   Wound Depth (cm) 0.3 cm 12/06/22 1348   Wound Volume (cm^3) 4.05 cm^3 12/06/22 1348   Wound Surface Area (cm^2) 13.5 cm^2 12/06/22 1348   Care Cleansed with:;Sterile normal saline 12/06/22 1348   Dressing Applied;Silver;Calcium alginate;Absorptive Pad;Compression wrap 12/06/22 1348   Periwound Care Absorptive dressing applied;Moisturizer applied 12/06/22 1348   Compression Four layer compression 12/06/22 1348   Dressing Change Due 12/08/22 12/06/22 1348         Assessment/Plan:         ICD-10-CM ICD-9-CM   1. Nonhealing ulcer of left lower  "leg with fat layer exposed  L97.922 707.19   2. Nonhealing ulcer of right lower leg with fat layer exposed  L97.912 707.19   3. Venous reflux  I87.2 459.81   4. Skin ulcer of left calf with fat layer exposed  L97.222 707.12   5. Skin ulcer of right calf with fat layer exposed  L97.212 707.12   6. Edema of leg  R60.0 782.3   7. Other diabetic neurological complication associated with type 2 diabetes mellitus  E11.49 250.60   8. Severe obesity (BMI >= 40)  E66.01 278.01   9. Hypergranulation  L92.9 701.5           Tissue pathology and/or culture taken:  [] Yes [x] No   Sharp debridement performed:   [] Yes [x] No   Labs ordered this visit:   [] Yes [x] No   Imaging ordered this visit:   [] Yes [x] No           Orders Placed This Encounter   Procedures    HME - OTHER     Order Specific Question:   Type of Equipment:     Answer:   bilateral knee high CircAid with sock     Order Specific Question:   Height:     Answer:   5' 9" (1.753 m)     Order Specific Question:   Weight:     Answer:   124.3 kg (274 lb)     Order Specific Question:   Does patient have medical equipment at home?     Answer:   wheelchair    Change dressing     Bilateral leg wounds:     Cleanse wound with: Normal Saline   Lidocaine: PRN   Maru Wound: Sween or Lotion to BLE   Primary dressing:Calcium Alginate AG to any open wounds.   Secondary dressing: ABD pad   Edema control: Profore 4-layer dressing toes to knee   Frequency: Mondays, Thursdays and PRN   Follow-up: 2 weeks 12/20/22 Dr. Farrell    [facility] as ordered above.  Circaids ordered 12/6/22      Silver nitrate x1 applied to L heel hypergranulation.  Follow up in about 2 weeks (around 12/20/2022) for .    "

## 2022-12-07 NOTE — PLAN OF CARE
Problem: Adult Inpatient Plan of Care  Goal: Plan of Care Review  Outcome: Ongoing, Progressing  Goal: Patient-Specific Goal (Individualized)  Outcome: Ongoing, Progressing  Goal: Absence of Hospital-Acquired Illness or Injury  Outcome: Ongoing, Progressing  Goal: Optimal Comfort and Wellbeing  Outcome: Ongoing, Progressing  Goal: Readiness for Transition of Care  Outcome: Ongoing, Progressing     Problem: Bariatric Environmental Safety  Goal: Safety Maintained with Care  Outcome: Ongoing, Progressing     Problem: Diabetes Comorbidity  Goal: Blood Glucose Level Within Targeted Range  Outcome: Ongoing, Progressing     Problem: Fluid and Electrolyte Imbalance (Acute Kidney Injury/Impairment)  Goal: Fluid and Electrolyte Balance  Outcome: Ongoing, Progressing     Problem: Oral Intake Inadequate (Acute Kidney Injury/Impairment)  Goal: Optimal Nutrition Intake  Outcome: Ongoing, Progressing     Problem: Renal Function Impairment (Acute Kidney Injury/Impairment)  Goal: Effective Renal Function  Outcome: Ongoing, Progressing     Problem: Impaired Wound Healing  Goal: Optimal Wound Healing  Outcome: Ongoing, Progressing     Problem: Skin Injury Risk Increased  Goal: Skin Health and Integrity  Outcome: Ongoing, Progressing     Problem: Infection  Goal: Absence of Infection Signs and Symptoms  Outcome: Ongoing, Progressing     Problem: Fall Injury Risk  Goal: Absence of Fall and Fall-Related Injury  Outcome: Ongoing, Progressing      Patient called for medication refill for  -Oxycodone 10 mg tab  QTY:120  -Diazepam 5 mg tab  QTY:60            Thanks

## 2022-12-19 PROBLEM — I21.4 NSTEMI (NON-ST ELEVATED MYOCARDIAL INFARCTION): Status: RESOLVED | Noted: 2022-09-19 | Resolved: 2022-12-19

## 2022-12-20 ENCOUNTER — HOSPITAL ENCOUNTER (OUTPATIENT)
Dept: WOUND CARE | Facility: HOSPITAL | Age: 72
Discharge: HOME | End: 2022-12-20
Attending: SURGERY
Payer: MEDICARE

## 2022-12-20 VITALS
TEMPERATURE: 98 F | BODY MASS INDEX: 40.58 KG/M2 | WEIGHT: 274 LBS | HEIGHT: 69 IN | DIASTOLIC BLOOD PRESSURE: 89 MMHG | SYSTOLIC BLOOD PRESSURE: 224 MMHG | HEART RATE: 40 BPM

## 2022-12-20 DIAGNOSIS — I87.2 VENOUS REFLUX: ICD-10-CM

## 2022-12-20 DIAGNOSIS — L97.912 NONHEALING ULCER OF RIGHT LOWER LEG WITH FAT LAYER EXPOSED: ICD-10-CM

## 2022-12-20 DIAGNOSIS — R60.0 EDEMA OF LEG: ICD-10-CM

## 2022-12-20 DIAGNOSIS — L97.922 NONHEALING ULCER OF LEFT LOWER LEG WITH FAT LAYER EXPOSED: Primary | ICD-10-CM

## 2022-12-20 PROCEDURE — 29581 APPL MULTLAYER CMPRN SYS LEG: CPT

## 2022-12-20 NOTE — PROGRESS NOTES
[facility]    Subjective:       Patient ID: Saji Castañeda is a 72 y.o. male.    Chief Complaint: Venous Ulcer (Left leg)    RLE no open wounds, wounds have healed. Tubigrip F toes to knee. LLE sites improved. Continue POC. Rx for compression stockings provided (with and without zippers)12/20/22. Orders sent to .    Review of Systems   All systems were reviewed and are negative, except that mentioned in the HPI.    Objective:     Vitals:    12/20/22 1339   BP: (!) 224/89   Pulse: (!) 40   Temp: 97.8 °F (36.6 °C)         Physical Exam  Constitutional:       Appearance: He is well-developed.   HENT:      Head: Normocephalic and atraumatic.   Eyes:      Pupils: Pupils are equal, round, and reactive to light.   Cardiovascular:      Rate and Rhythm: Normal rate.   Pulmonary:      Effort: Pulmonary effort is normal. No respiratory distress.      Breath sounds: No stridor.   Abdominal:      General: There is no distension.   Musculoskeletal:      Cervical back: Normal range of motion.   Skin:     Comments: See Synopsis for wound details   Neurological:      Mental Status: He is alert and oriented to person, place, and time.          Altered Skin Integrity 09/22/22 0130 Left posterior Calf (Active)   09/22/22 0130   Altered Skin Integrity Present on Admission:    Side: Left   Orientation: posterior   Location: Calf   Wound Number:    Is this injury device related?:    Primary Wound Type:    Description of Altered Skin Integrity:    Ankle-Brachial Index:    Pulses: positive with doppler   Removal Indication and Assessment:    Wound Outcome:    (Retired) Wound Length (cm):    (Retired) Wound Width (cm):    (Retired) Depth (cm):    Wound Description (Comments):    Removal Indications:    Wound Image   12/20/22 1331   Dressing Appearance Moist drainage 12/20/22 1331   Drainage Amount Moderate 12/20/22 1331   Drainage Characteristics/Odor Yellow;Serous 12/20/22 1331    Appearance Yellow 12/20/22 1331   Tissue loss description Full thickness 12/20/22 1331   Yellow (%), Wound Tissue Color 100 % 12/20/22 1331   Periwound Area Hemosiderin Staining;Edematous 12/20/22 1331   Wound Edges Irregular 12/20/22 1331   Wound Length (cm) 1 cm 12/20/22 1331   Wound Width (cm) 1.4 cm 12/20/22 1331   Wound Depth (cm) 0.5 cm 12/20/22 1331   Wound Volume (cm^3) 0.7 cm^3 12/20/22 1331   Wound Surface Area (cm^2) 1.4 cm^2 12/20/22 1331   Care Cleansed with:;Sterile normal saline 12/20/22 1331   Dressing Applied;Calcium alginate;Silver;Compression wrap 12/20/22 1331   Periwound Care Moisturizer applied 12/20/22 1331   Compression Four layer compression 12/20/22 1331   Dressing Change Due 12/23/22 12/20/22 1331            Altered Skin Integrity 12/20/22 1300 Left Heel (Active)   12/20/22 1300   Altered Skin Integrity Present on Admission: yes   Side: Left   Orientation:    Location: Heel   Wound Number:    Is this injury device related?:    Primary Wound Type:    Description of Altered Skin Integrity:    Ankle-Brachial Index:    Pulses:    Removal Indication and Assessment:    Wound Outcome:    (Retired) Wound Length (cm):    (Retired) Wound Width (cm):    (Retired) Depth (cm):    Wound Description (Comments):    Removal Indications:    Wound Image   12/20/22 1331   Dressing Appearance Moist drainage 12/20/22 1331   Drainage Amount Moderate 12/20/22 1331   Drainage Characteristics/Odor Serosanguineous 12/20/22 1331   Appearance Pink;Moist 12/20/22 1331   Tissue loss description Full thickness 12/20/22 1331   Red (%), Wound Tissue Color 100 % 12/20/22 1331   Periwound Area Dry 12/20/22 1331   Wound Edges Irregular;Callused 12/20/22 1331   Peralta Classification (diabetic foot ulcers only) Grade 2 12/20/22 1331   Wound Length (cm) 5 cm 12/20/22 1331   Wound Width (cm) 2.5 cm 12/20/22 1331   Wound Depth (cm) 0.2 cm 12/20/22 1331   Wound Volume (cm^3) 2.5 cm^3 12/20/22 1331   Wound Surface Area (cm^2) 12.5  cm^2 12/20/22 1331   Care Cleansed with:;Sterile normal saline 12/20/22 1331   Dressing Applied;Calcium alginate;Silver;Compression wrap 12/20/22 1331   Periwound Care Moisturizer applied 12/20/22 1331   Compression Four layer compression 12/20/22 1331   Dressing Change Due 12/26/22 12/20/22 1331         Assessment/Plan:         ICD-10-CM ICD-9-CM   1. Nonhealing ulcer of left lower leg with fat layer exposed  L97.922 707.19   2. Nonhealing ulcer of right lower leg with fat layer exposed  L97.912 707.19   3. Venous reflux  I87.2 459.81   4. Edema of leg  R60.0 782.3           Tissue pathology and/or culture taken:  [] Yes [x] No   Sharp debridement performed:   [] Yes [x] No   Labs ordered this visit:   [] Yes [x] No   Imaging ordered this visit:   [] Yes [x] No           Orders Placed This Encounter   Procedures    COMPRESSION STOCKINGS     Bilateral knee high compression stockings     Order Specific Question:   Pressure amount:     Answer:   20-30 mmHg    COMPRESSION STOCKINGS     Bilateral knee high compression with ZIPPERS     Order Specific Question:   Pressure amount:     Answer:   20-30 mmHg    Change dressing     Left posterior calf and left heel:    Cleanse wound with: Normal Saline   Lidocaine: PRN   Maru Wound: Sween or Lotion to BLE   Primary dressing:Calcium Alginate AG to any open wounds.   Secondary dressing: ABD pad   Edema control: Profore 4-layer dressing toes to knee LLE. Tubigrip F RLE toes to knee.  Frequency: Mondays, Thursdays and PRN   Follow-up: Dr. Farrell 1/3/23  Home Health: [facility] to change dressing as ordered above.   Circaids ordered 12/6/22. Rx for compression stockings 12/20/22.    Change dressing        Follow up in about 16 days (around 1/5/2023).

## 2022-12-29 NOTE — PROGRESS NOTES
[facility]    Subjective:       Patient ID: Saji Castañeda is a 72 y.o. male.    Chief Complaint: Venous Ulcer (Left leg)    RLE no open wounds, wounds have healed. Tubigrip F toes to knee. LLE sites improved. Continue POC. Rx for compression stockings provided (with and without zippers)12/20/22. Orders sent to .    Review of Systems   All systems were reviewed and are negative, except that mentioned in the HPI.    Objective:     Vitals:    12/20/22 1339   BP: (!) 224/89   Pulse: (!) 40   Temp: 97.8 °F (36.6 °C)         Physical Exam  Constitutional:       Appearance: He is well-developed.   HENT:      Head: Normocephalic and atraumatic.   Eyes:      Pupils: Pupils are equal, round, and reactive to light.   Cardiovascular:      Rate and Rhythm: Normal rate.   Pulmonary:      Effort: Pulmonary effort is normal. No respiratory distress.      Breath sounds: No stridor.   Abdominal:      General: There is no distension.   Musculoskeletal:      Cervical back: Normal range of motion.   Skin:     Comments: See Synopsis for wound details   Neurological:      Mental Status: He is alert and oriented to person, place, and time.          Altered Skin Integrity 09/22/22 0130 Left posterior Calf (Active)   09/22/22 0130   Altered Skin Integrity Present on Admission:    Side: Left   Orientation: posterior   Location: Calf   Wound Number:    Is this injury device related?:    Primary Wound Type:    Description of Altered Skin Integrity:    Ankle-Brachial Index:    Pulses: positive with doppler   Removal Indication and Assessment:    Wound Outcome:    (Retired) Wound Length (cm):    (Retired) Wound Width (cm):    (Retired) Depth (cm):    Wound Description (Comments):    Removal Indications:    Wound Image   12/20/22 1331   Dressing Appearance Moist drainage 12/20/22 1331   Drainage Amount Moderate 12/20/22 1331   Drainage Characteristics/Odor Yellow;Serous 12/20/22 1331    Appearance Yellow 12/20/22 1331   Tissue loss description Full thickness 12/20/22 1331   Yellow (%), Wound Tissue Color 100 % 12/20/22 1331   Periwound Area Hemosiderin Staining;Edematous 12/20/22 1331   Wound Edges Irregular 12/20/22 1331   Wound Length (cm) 1 cm 12/20/22 1331   Wound Width (cm) 1.4 cm 12/20/22 1331   Wound Depth (cm) 0.5 cm 12/20/22 1331   Wound Volume (cm^3) 0.7 cm^3 12/20/22 1331   Wound Surface Area (cm^2) 1.4 cm^2 12/20/22 1331   Care Cleansed with:;Sterile normal saline 12/20/22 1331   Dressing Applied;Calcium alginate;Silver;Compression wrap 12/20/22 1331   Periwound Care Moisturizer applied 12/20/22 1331   Compression Four layer compression 12/20/22 1331   Dressing Change Due 12/23/22 12/20/22 1331            Altered Skin Integrity 12/20/22 1300 Left Heel (Active)   12/20/22 1300   Altered Skin Integrity Present on Admission: yes   Side: Left   Orientation:    Location: Heel   Wound Number:    Is this injury device related?:    Primary Wound Type:    Description of Altered Skin Integrity:    Ankle-Brachial Index:    Pulses:    Removal Indication and Assessment:    Wound Outcome:    (Retired) Wound Length (cm):    (Retired) Wound Width (cm):    (Retired) Depth (cm):    Wound Description (Comments):    Removal Indications:    Wound Image   12/20/22 1331   Dressing Appearance Moist drainage 12/20/22 1331   Drainage Amount Moderate 12/20/22 1331   Drainage Characteristics/Odor Serosanguineous 12/20/22 1331   Appearance Pink;Moist 12/20/22 1331   Tissue loss description Full thickness 12/20/22 1331   Red (%), Wound Tissue Color 100 % 12/20/22 1331   Periwound Area Dry 12/20/22 1331   Wound Edges Irregular;Callused 12/20/22 1331   Peralta Classification (diabetic foot ulcers only) Grade 2 12/20/22 1331   Wound Length (cm) 5 cm 12/20/22 1331   Wound Width (cm) 2.5 cm 12/20/22 1331   Wound Depth (cm) 0.2 cm 12/20/22 1331   Wound Volume (cm^3) 2.5 cm^3 12/20/22 1331   Wound Surface Area (cm^2) 12.5  cm^2 12/20/22 1331   Care Cleansed with:;Sterile normal saline 12/20/22 1331   Dressing Applied;Calcium alginate;Silver;Compression wrap 12/20/22 1331   Periwound Care Moisturizer applied 12/20/22 1331   Compression Four layer compression 12/20/22 1331   Dressing Change Due 12/26/22 12/20/22 1331         Assessment/Plan:         ICD-10-CM ICD-9-CM   1. Nonhealing ulcer of left lower leg with fat layer exposed  L97.922 707.19   2. Nonhealing ulcer of right lower leg with fat layer exposed  L97.912 707.19   3. Venous reflux  I87.2 459.81   4. Edema of leg  R60.0 782.3           Tissue pathology and/or culture taken:  [] Yes [x] No   Sharp debridement performed:   [] Yes [x] No   Labs ordered this visit:   [] Yes [x] No   Imaging ordered this visit:   [] Yes [x] No           Orders Placed This Encounter   Procedures    COMPRESSION STOCKINGS     Bilateral knee high compression stockings     Order Specific Question:   Pressure amount:     Answer:   20-30 mmHg    COMPRESSION STOCKINGS     Bilateral knee high compression with ZIPPERS     Order Specific Question:   Pressure amount:     Answer:   20-30 mmHg    Change dressing     Left posterior calf and left heel:    Cleanse wound with: Normal Saline   Lidocaine: PRN   Maru Wound: Sween or Lotion to BLE   Primary dressing:Calcium Alginate AG to any open wounds.   Secondary dressing: ABD pad   Edema control: Profore 4-layer dressing toes to knee LLE. Tubigrip F RLE toes to knee.  Frequency: Mondays, Thursdays and PRN   Follow-up: Dr. Farrell 1/3/23  Home Health: [facility] to change dressing as ordered above.   Circaids ordered 12/6/22. Rx for compression stockings 12/20/22.    Change dressing        Follow up in about 16 days (around 1/5/2023).

## 2023-01-05 ENCOUNTER — HOSPITAL ENCOUNTER (OUTPATIENT)
Dept: WOUND CARE | Facility: HOSPITAL | Age: 73
Discharge: HOME | End: 2023-01-05
Attending: SURGERY
Payer: MEDICARE

## 2023-01-05 DIAGNOSIS — L97.212 SKIN ULCER OF RIGHT CALF WITH FAT LAYER EXPOSED: ICD-10-CM

## 2023-01-05 DIAGNOSIS — L89.624 PRESSURE INJURY OF LEFT HEEL, STAGE 4: ICD-10-CM

## 2023-01-05 DIAGNOSIS — I87.2 VENOUS REFLUX: ICD-10-CM

## 2023-01-05 DIAGNOSIS — L97.922 NONHEALING ULCER OF LEFT LOWER LEG WITH FAT LAYER EXPOSED: Primary | ICD-10-CM

## 2023-01-05 DIAGNOSIS — Z89.432 STATUS POST PARTIAL AMPUTATION OF LEFT FOOT: ICD-10-CM

## 2023-01-05 DIAGNOSIS — S91.302D OPEN WOUND OF LEFT FOOT WITH COMPLICATION, SUBSEQUENT ENCOUNTER: ICD-10-CM

## 2023-01-05 DIAGNOSIS — R60.0 EDEMA OF LEG: Chronic | ICD-10-CM

## 2023-01-05 PROCEDURE — 29581 APPL MULTLAYER CMPRN SYS LEG: CPT | Mod: 50

## 2023-01-05 NOTE — PROGRESS NOTES
[facility]    Subjective:       Patient ID: Saji Castañeda is a 72 y.o. male.    Chief Complaint: Wound Care    1/5/2023  Follow up with Dr Farrell related to left posterior calf and left heel wounds.  New wound present to right lateral leg, patient states it started a week ago, home health put Aquacel Ag and a profore wrap to the right leg.  New blisters present to left anterior leg, unknown origin.  Wound improvement to left heel noted by MD.  Plan of care updated.  Orders faxed to [facility].  Follow up with Dr Farrell in 2 weeks on 1/19/2023.  Venous reflux confirmed on US in 11/2022. Referral placed for eval/recs and treatment with Interventional cardiologist (Dr. Wu). Pt is disabled, has partial amputation of left foot and is unable to apply traditional compression stockings. He is a great candidate for Circaids and we will begint hat application process.      Review of Systems   All systems were reviewed and are negative, except that mentioned in the HPI.    Objective:   There were no vitals filed for this visit.      Physical Exam  Constitutional:       Appearance: He is well-developed.   HENT:      Head: Normocephalic and atraumatic.   Eyes:      Pupils: Pupils are equal, round, and reactive to light.   Cardiovascular:      Rate and Rhythm: Normal rate.   Pulmonary:      Effort: Pulmonary effort is normal. No respiratory distress.      Breath sounds: No stridor.   Abdominal:      General: There is no distension.   Musculoskeletal:      Cervical back: Normal range of motion.   Skin:     Comments: See Synopsis for wound details   Neurological:      Mental Status: He is alert and oriented to person, place, and time.          Altered Skin Integrity 09/22/22 0130 Left posterior Calf (Active)   09/22/22 0130   Altered Skin Integrity Present on Admission:    Side: Left   Orientation: posterior   Location: Calf   Wound Number:    Is this injury device  related?:    Primary Wound Type:    Description of Altered Skin Integrity:    Ankle-Brachial Index:    Pulses: positive with doppler   Removal Indication and Assessment:    Wound Outcome:    (Retired) Wound Length (cm):    (Retired) Wound Width (cm):    (Retired) Depth (cm):    Wound Description (Comments):    Removal Indications:    Wound Image   01/05/23 1500   Description of Altered Skin Integrity Full thickness tissue loss. Subcutaneous fat may be visible but bone, tendon or muscle are not exposed 01/05/23 1500   Dressing Appearance Moist drainage 01/05/23 1500   Drainage Amount Moderate 01/05/23 1500   Drainage Characteristics/Odor Yellow;Serous 01/05/23 1500   Appearance Red;Moist 01/05/23 1500   Tissue loss description Full thickness 01/05/23 1500   Red (%), Wound Tissue Color 100 % 01/05/23 1500   Periwound Area Hemosiderin Staining;Edematous 01/05/23 1500   Wound Edges Irregular 01/05/23 1500   Wound Length (cm) 1.9 cm 01/05/23 1500   Wound Width (cm) 2 cm 01/05/23 1500   Wound Depth (cm) 0.5 cm 01/05/23 1500   Wound Volume (cm^3) 1.9 cm^3 01/05/23 1500   Wound Surface Area (cm^2) 3.8 cm^2 01/05/23 1500   Care Cleansed with:;Soap and water;Sterile normal saline 01/05/23 1500   Dressing Applied;Hydrofiber;Compression wrap;Absorptive Pad 01/05/23 1500   Periwound Care Moisturizer applied 01/05/23 1500   Compression Four layer compression 01/05/23 1500   Dressing Change Due 01/09/23 01/05/23 1500            Altered Skin Integrity 01/05/23 1512 Right lower;lateral Leg (Active)   01/05/23 1512   Altered Skin Integrity Present on Admission: yes   Side: Right   Orientation: lower;lateral   Location: Leg   Wound Number:    Is this injury device related?:    Primary Wound Type:    Description of Altered Skin Integrity:    Ankle-Brachial Index:    Pulses:    Removal Indication and Assessment:    Wound Outcome:    (Retired) Wound Length (cm):    (Retired) Wound Width (cm):    (Retired) Depth (cm):    Wound Description  (Comments):    Removal Indications:    Wound Image   01/05/23 1500   Description of Altered Skin Integrity Full thickness tissue loss. Subcutaneous fat may be visible but bone, tendon or muscle are not exposed 01/05/23 1500   Dressing Appearance Intact;Moist drainage 01/05/23 1500   Drainage Amount Large 01/05/23 1500   Drainage Characteristics/Odor Serosanguineous 01/05/23 1500   Appearance Red;Moist;Yellow 01/05/23 1500   Tissue loss description Full thickness 01/05/23 1500   Red (%), Wound Tissue Color 80 % 01/05/23 1500   Yellow (%), Wound Tissue Color 20 % 01/05/23 1500   Periwound Area Hemosiderin Staining;Dry;Satellite lesion 01/05/23 1500   Wound Edges Open 01/05/23 1500   Wound Length (cm) 10.5 cm 01/05/23 1500   Wound Width (cm) 13 cm 01/05/23 1500   Wound Depth (cm) 0.1 cm 01/05/23 1500   Wound Volume (cm^3) 13.65 cm^3 01/05/23 1500   Wound Surface Area (cm^2) 136.5 cm^2 01/05/23 1500   Care Cleansed with:;Soap and water;Sterile normal saline 01/05/23 1500   Dressing Applied;Hydrofiber;Compression wrap;Absorptive Pad 01/05/23 1500   Periwound Care Moisturizer applied 01/05/23 1500   Compression Four layer compression 01/05/23 1500   Dressing Change Due 01/09/23 01/05/23 1500            Altered Skin Integrity 01/05/23 1512 Left anterior;lower Leg Venous Ulcer (Active)   01/05/23 1512   Altered Skin Integrity Present on Admission: yes   Side: Left   Orientation: anterior;lower   Location: Leg   Wound Number:    Is this injury device related?:    Primary Wound Type: Venous ulcer   Description of Altered Skin Integrity:    Ankle-Brachial Index:    Pulses:    Removal Indication and Assessment:    Wound Outcome:    (Retired) Wound Length (cm):    (Retired) Wound Width (cm):    (Retired) Depth (cm):    Wound Description (Comments):    Removal Indications:    Wound Image   01/05/23 1500   Description of Altered Skin Integrity Partial thickness tissue loss. Shallow open ulcer with a red or pink wound bed, without  slough. Intact or Open/Ruptured Serum-filled blister. 01/05/23 1500   Dressing Appearance Intact;Moist drainage 01/05/23 1500   Drainage Amount Moderate 01/05/23 1500   Drainage Characteristics/Odor Serous 01/05/23 1500   Appearance Pink;Moist 01/05/23 1500   Tissue loss description Partial thickness 01/05/23 1500   Red (%), Wound Tissue Color 100 % 01/05/23 1500   Periwound Area Intact;Satellite lesion;Dry 01/05/23 1500   Wound Edges Open 01/05/23 1500   Wound Length (cm) 4.8 cm 01/05/23 1500   Wound Width (cm) 3.1 cm 01/05/23 1500   Wound Depth (cm) 0.1 cm 01/05/23 1500   Wound Volume (cm^3) 1.488 cm^3 01/05/23 1500   Wound Surface Area (cm^2) 14.88 cm^2 01/05/23 1500   Care Cleansed with:;Soap and water;Sterile normal saline 01/05/23 1500   Dressing Applied;Hydrofiber;Compression wrap;Absorptive Pad 01/05/23 1500   Periwound Care Moisturizer applied 01/05/23 1500   Compression Four layer compression 01/05/23 1500   Dressing Change Due 01/09/23 01/05/23 1500            (Retired) Wound 09/22/22 1333 Diabetic Ulcer Left Heel (Active)   09/22/22 1333    Pre-existing: Yes   Primary Wound Type: Diabetic ulc   Side: Left   Orientation:    Location: Heel   Wound Number:    Ankle-Brachial Index:    Pulses:    Removal Indication and Assessment:    Wound Outcome:    (Retired) Wound Type:    (Retired) Wound Length (cm):    (Retired) Wound Width (cm):    (Retired) Depth (cm):    Wound Description (Comments):    Removal Indications:    Wound Image   01/05/23 1500   Dressing Appearance Intact;Moist drainage 01/05/23 1500   Drainage Amount Moderate 01/05/23 1500   Drainage Characteristics/Odor Yellow;Serosanguineous 01/05/23 1500   Appearance Red;Moist 01/05/23 1500   Tissue loss description Full thickness 01/05/23 1500   Red (%), Wound Tissue Color 100 % 01/05/23 1500   Periwound Area Dry 01/05/23 1500   Wound Edges Irregular 01/05/23 1500   Wound Length (cm) 2.2 cm 01/05/23 1500   Wound Width (cm) 1.8 cm 01/05/23 1500   Wound  Depth (cm) 0.2 cm 01/05/23 1500   Wound Volume (cm^3) 0.792 cm^3 01/05/23 1500   Wound Surface Area (cm^2) 3.96 cm^2 01/05/23 1500   Care Cleansed with:;Soap and water;Sterile normal saline 01/05/23 1500   Dressing Applied;Hydrofiber;Compression wrap;Absorptive Pad 01/05/23 1500   Periwound Care Moisturizer applied 01/05/23 1500   Compression Four layer compression 01/05/23 1500   Dressing Change Due 01/09/23 01/05/23 1500         Assessment/Plan:         ICD-10-CM ICD-9-CM   1. Nonhealing ulcer of left lower leg with fat layer exposed  L97.922 707.19   2. Skin ulcer of right calf with fat layer exposed  L97.212 707.12   3. Open wound of left foot with complication, subsequent encounter  S91.302D V58.89     892.1   4. Pressure injury of left heel, stage 4  L89.624 707.07     707.24   5. Edema of leg  R60.0 782.3   6. Venous reflux  I87.2 459.81   7. Status post partial amputation of left foot  Z89.432 V49.73           Tissue pathology and/or culture taken:  [] Yes [x] No   Sharp debridement performed:   [] Yes [x] No   Labs ordered this visit:   [] Yes [x] No   Imaging ordered this visit:   [] Yes [x] No           Orders Placed This Encounter   Procedures    Change dressing     Right lateral leg, left posterior calf and left heel:     Cleanse wound with: Normal Saline   Lidocaine: PRN   Maru Wound: Sween or Lotion to BLE   Primary dressing: Hydrofera Blue Ready  Secondary dressing: ABD pad   Edema control: Profore 4-layer dressing toes to knee bilateral legs   Frequency: Mondays, Thursdays and PRN   Follow-up: Dr. Farrell 1/19/23   Home Health: [facility] to change dressing as ordered above twice weekly except when patient is in clinic.  Thank you.   Circaids ordered 12/6/22. Rx for compression stockings 12/20/22.      Pt is not a candidate for traditional compression stocking due to his debility, immobility, social circumstances so Circaids ordered.   Pt referred again to interventional cards for eval/recs  re: venous reflux.  All questions were answered.  Follow up in 2 weeks (on 1/19/2023) for Dr Farrell.

## 2023-01-06 DIAGNOSIS — I87.2 VENOUS REFLUX: ICD-10-CM

## 2023-01-06 DIAGNOSIS — L97.212 SKIN ULCER OF RIGHT CALF WITH FAT LAYER EXPOSED: ICD-10-CM

## 2023-01-06 DIAGNOSIS — L89.624 PRESSURE INJURY OF LEFT HEEL, STAGE 4: ICD-10-CM

## 2023-01-06 DIAGNOSIS — R60.0 EDEMA OF LEG: ICD-10-CM

## 2023-01-06 DIAGNOSIS — L97.922 NONHEALING ULCER OF LEFT LOWER LEG WITH FAT LAYER EXPOSED: Primary | ICD-10-CM

## 2023-01-19 ENCOUNTER — HOSPITAL ENCOUNTER (OUTPATIENT)
Dept: WOUND CARE | Facility: HOSPITAL | Age: 73
Discharge: HOME | End: 2023-01-19
Attending: SURGERY
Payer: MEDICARE

## 2023-01-19 VITALS
HEIGHT: 69 IN | SYSTOLIC BLOOD PRESSURE: 117 MMHG | DIASTOLIC BLOOD PRESSURE: 65 MMHG | TEMPERATURE: 98 F | BODY MASS INDEX: 40.58 KG/M2 | WEIGHT: 274 LBS | HEART RATE: 69 BPM

## 2023-01-19 DIAGNOSIS — L97.922 NONHEALING ULCER OF LEFT LOWER LEG WITH FAT LAYER EXPOSED: ICD-10-CM

## 2023-01-19 DIAGNOSIS — Z89.432 STATUS POST PARTIAL AMPUTATION OF LEFT FOOT: ICD-10-CM

## 2023-01-19 DIAGNOSIS — R60.0 EDEMA OF LEG: ICD-10-CM

## 2023-01-19 DIAGNOSIS — L97.912 NONHEALING ULCER OF RIGHT LOWER LEG WITH FAT LAYER EXPOSED: Primary | ICD-10-CM

## 2023-01-19 PROCEDURE — 29581 APPL MULTLAYER CMPRN SYS LEG: CPT

## 2023-01-19 NOTE — PROGRESS NOTES
[facility]    Subjective:       Patient ID: Saji Castañeda is a 72 y.o. male.    Chief Complaint: Wound Care    Follow up for left posterior calf and left heel wounds. LLE wounds appear improved. Wound on RLE presents macerated. Urinary incontinence noted. New Orders noted and sent to     Review of Systems   All systems were reviewed and are negative, except that mentioned in the HPI.    Objective:     Vitals:    01/19/23 1335   BP: 117/65   Pulse: 69   Temp: 97.5 °F (36.4 °C)         Physical Exam  Constitutional:       Appearance: He is well-developed.   HENT:      Head: Normocephalic and atraumatic.   Eyes:      Pupils: Pupils are equal, round, and reactive to light.   Cardiovascular:      Rate and Rhythm: Normal rate.   Pulmonary:      Effort: Pulmonary effort is normal. No respiratory distress.      Breath sounds: No stridor.   Abdominal:      General: There is no distension.   Musculoskeletal:      Cervical back: Normal range of motion.   Skin:     Comments: See Synopsis for wound details   Neurological:      Mental Status: He is alert and oriented to person, place, and time.          Altered Skin Integrity 09/22/22 0130 Left posterior Calf (Active)   09/22/22 0130   Altered Skin Integrity Present on Admission:    Side: Left   Orientation: posterior   Location: Calf   Wound Number:    Is this injury device related?:    Primary Wound Type:    Description of Altered Skin Integrity:    Ankle-Brachial Index:    Pulses: positive with doppler   Removal Indication and Assessment:    Wound Outcome:    (Retired) Wound Length (cm):    (Retired) Wound Width (cm):    (Retired) Depth (cm):    Wound Description (Comments):    Removal Indications:    Wound Image   01/19/23 1546   Dressing Appearance Intact;Moist drainage 01/19/23 1546   Drainage Amount Moderate 01/19/23 1546   Drainage Characteristics/Odor Serosanguineous;Yellow 01/19/23 1546   Appearance Intact;Red;Moist  01/19/23 1546   Tissue loss description Full thickness 01/19/23 1546   Red (%), Wound Tissue Color 100 % 01/19/23 1546   Periwound Area Hemosiderin Staining;Edematous 01/19/23 1546   Wound Edges Irregular 01/19/23 1546   Wound Length (cm) 1.5 cm 01/19/23 1546   Wound Width (cm) 1.8 cm 01/19/23 1546   Wound Depth (cm) 0.5 cm 01/19/23 1546   Wound Volume (cm^3) 1.35 cm^3 01/19/23 1546   Wound Surface Area (cm^2) 2.7 cm^2 01/19/23 1546   Care Cleansed with:;Soap and water;Sterile normal saline 01/19/23 1546   Dressing Applied;Changed;Calcium alginate;Hydrofiber;Absorptive Pad;Compression wrap 01/19/23 1546   Periwound Care Topical treatment applied 01/19/23 1546   Compression Four layer compression 01/19/23 1546   Dressing Change Due 02/02/23 01/19/23 1546            Altered Skin Integrity 01/05/23 1512 Right lower;lateral Leg (Active)   01/05/23 1512   Altered Skin Integrity Present on Admission: yes   Side: Right   Orientation: lower;lateral   Location: Leg   Wound Number:    Is this injury device related?:    Primary Wound Type:    Description of Altered Skin Integrity:    Ankle-Brachial Index:    Pulses:    Removal Indication and Assessment:    Wound Outcome:    (Retired) Wound Length (cm):    (Retired) Wound Width (cm):    (Retired) Depth (cm):    Wound Description (Comments):    Removal Indications:    Wound Image   01/19/23 1546   Dressing Appearance Intact;Moist drainage 01/19/23 1546   Drainage Amount Moderate 01/19/23 1546   Drainage Characteristics/Odor Serosanguineous 01/19/23 1546   Appearance Intact;Red;Moist 01/19/23 1546   Tissue loss description Full thickness 01/19/23 1546   Red (%), Wound Tissue Color 80 % 01/19/23 1546   Yellow (%), Wound Tissue Color 20 % 01/19/23 1546   Periwound Area Intact;Macerated 01/19/23 1546   Wound Edges Irregular;Open 01/19/23 1546   Wound Length (cm) 10 cm 01/19/23 1546   Wound Width (cm) 12.8 cm 01/19/23 1546   Wound Depth (cm) 0.1 cm 01/19/23 1546   Wound Volume (cm^3)  12.8 cm^3 01/19/23 1546   Wound Surface Area (cm^2) 128 cm^2 01/19/23 1546   Care Cleansed with:;Soap and water;Sterile normal saline 01/19/23 1546   Dressing Applied;Changed;Hydrofiber;Calcium alginate;Compression wrap;Absorptive Pad 01/19/23 1546   Periwound Care Topical treatment applied 01/19/23 1546   Compression Four layer compression 01/19/23 1546   Dressing Change Due 02/02/23 01/19/23 1546            Altered Skin Integrity 01/05/23 1512 Left anterior;lower Leg Venous Ulcer (Active)   01/05/23 1512   Altered Skin Integrity Present on Admission: yes   Side: Left   Orientation: anterior;lower   Location: Leg   Wound Number:    Is this injury device related?:    Primary Wound Type: Venous ulcer   Description of Altered Skin Integrity:    Ankle-Brachial Index:    Pulses:    Removal Indication and Assessment:    Wound Outcome:    (Retired) Wound Length (cm):    (Retired) Wound Width (cm):    (Retired) Depth (cm):    Wound Description (Comments):    Removal Indications:    Dressing Appearance Intact;Moist drainage 01/19/23 1546   Drainage Amount Moderate 01/19/23 1546   Drainage Characteristics/Odor Serosanguineous 01/19/23 1546   Appearance Pink;Moist 01/19/23 1546   Tissue loss description Partial thickness 01/19/23 1546   Red (%), Wound Tissue Color 100 % 01/19/23 1546   Periwound Area Intact;Satellite lesion 01/19/23 1546   Wound Edges Open 01/19/23 1546   Wound Length (cm) 4.5 cm 01/19/23 1546   Wound Width (cm) 3 cm 01/19/23 1546   Wound Depth (cm) 0.1 cm 01/19/23 1546   Wound Volume (cm^3) 1.35 cm^3 01/19/23 1546   Wound Surface Area (cm^2) 13.5 cm^2 01/19/23 1546   Care Cleansed with:;Soap and water;Sterile normal saline 01/19/23 1546   Dressing Applied;Changed;Hydrofiber;Calcium alginate;Absorptive Pad;Compression wrap 01/19/23 1546   Periwound Care Topical treatment applied 01/19/23 1546   Compression Four layer compression 01/19/23 1546   Dressing Change Due 02/02/23 01/19/23 1546            (Retired)  Wound 09/22/22 1333 Diabetic Ulcer Left Heel (Active)   09/22/22 1333    Pre-existing: Yes   Primary Wound Type: Diabetic ulc   Side: Left   Orientation:    Location: Heel   Wound Number:    Ankle-Brachial Index:    Pulses:    Removal Indication and Assessment:    Wound Outcome:    (Retired) Wound Type:    (Retired) Wound Length (cm):    (Retired) Wound Width (cm):    (Retired) Depth (cm):    Wound Description (Comments):    Removal Indications:    Wound Image   01/19/23 1546   Dressing Appearance Intact;Moist drainage 01/19/23 1546   Drainage Amount Moderate 01/19/23 1546   Drainage Characteristics/Odor Serosanguineous;Yellow 01/19/23 1546   Appearance Intact;Moist 01/19/23 1546   Tissue loss description Full thickness 01/19/23 1546   Red (%), Wound Tissue Color 100 % 01/19/23 1546   Periwound Area Intact 01/19/23 1546   Wound Edges Irregular 01/19/23 1546   Wound Length (cm) 2.2 cm 01/19/23 1546   Wound Width (cm) 1.7 cm 01/19/23 1546   Wound Depth (cm) 0.2 cm 01/19/23 1546   Wound Volume (cm^3) 0.748 cm^3 01/19/23 1546   Wound Surface Area (cm^2) 3.74 cm^2 01/19/23 1546   Care Cleansed with:;Soap and water;Sterile normal saline 01/19/23 1546   Dressing Applied;Changed;Hydrofiber;Calcium alginate;Absorptive Pad;Compression wrap 01/19/23 1546   Periwound Care Absorptive dressing applied;Topical treatment applied 01/19/23 1546   Compression Four layer compression 01/19/23 1546   Dressing Change Due 02/02/23 01/19/23 1546         Assessment/Plan:         ICD-10-CM ICD-9-CM   1. Nonhealing ulcer of right lower leg with fat layer exposed  L97.912 707.19   2. Nonhealing ulcer of left lower leg with fat layer exposed  L97.922 707.19   3. Edema of leg  R60.0 782.3   4. Status post partial amputation of left foot  Z89.432 V49.73           Tissue pathology and/or culture taken:  [] Yes [x] No   Sharp debridement performed:   [] Yes [x] No   Labs ordered this visit:   [] Yes [x] No   Imaging ordered this visit:   [] Yes [x]  No           Orders Placed This Encounter   Procedures    Change dressing     Right lateral leg, left posterior calf and left heel:     Cleanse wound with: Normal Saline   Lidocaine: PRN   Maru Wound: Sween or Lotion to BLE   Primary dressing: Hydrofera Blue Ready , Calcium alginate  Secondary dressing: ABD pad   Edema control: Profore 4-layer dressing toes to knee bilateral legs   Frequency: Mondays, Thursdays and PRN   Follow-up: Dr. Farrell 02/02/223  Home Health: [facility] to change dressing as ordered above twice weekly except when patient is in clinic.    Circaids ordered 12/6/22. Rx for compression stockings 12/20/22.        Follow up in about 2 weeks (around 2/2/2023).

## 2023-02-09 ENCOUNTER — HOSPITAL ENCOUNTER (OUTPATIENT)
Dept: WOUND CARE | Facility: HOSPITAL | Age: 73
Discharge: HOME | End: 2023-02-09
Attending: SURGERY
Payer: MEDICARE

## 2023-02-09 VITALS
HEART RATE: 40 BPM | TEMPERATURE: 97 F | DIASTOLIC BLOOD PRESSURE: 81 MMHG | SYSTOLIC BLOOD PRESSURE: 201 MMHG | WEIGHT: 274 LBS | HEIGHT: 69 IN | BODY MASS INDEX: 40.58 KG/M2

## 2023-02-09 DIAGNOSIS — L97.922 NONHEALING ULCER OF LEFT LOWER LEG WITH FAT LAYER EXPOSED: ICD-10-CM

## 2023-02-09 DIAGNOSIS — Z89.432 STATUS POST PARTIAL AMPUTATION OF LEFT FOOT: ICD-10-CM

## 2023-02-09 DIAGNOSIS — S91.302A NON HEALING LEFT HEEL WOUND: ICD-10-CM

## 2023-02-09 DIAGNOSIS — R60.0 EDEMA OF LEG: ICD-10-CM

## 2023-02-09 DIAGNOSIS — L92.9 HYPERGRANULATION: ICD-10-CM

## 2023-02-09 DIAGNOSIS — L97.912 NONHEALING ULCER OF RIGHT LOWER LEG WITH FAT LAYER EXPOSED: Primary | ICD-10-CM

## 2023-02-09 PROCEDURE — 11042 DBRDMT SUBQ TIS 1ST 20SQCM/<: CPT

## 2023-02-09 NOTE — PROGRESS NOTES
[facility]    Subjective:       Patient ID: Saji Castañeda is a 72 y.o. male.    Chief Complaint: Non-healing Wound Follow Up    2/9/23 Patient seen for non-healing wounds to BLE.  Repeated /79-42 on LUE, 223/90-41 on LUE.  Patient declined going to ED, stated that he will call PCP.  Left posterior calf wound debrided, Silver Nitrate x 1 used.Tolerated procedure well.  New orders routed to home health.    Review of Systems   All systems were reviewed and are negative, except that mentioned in the HPI.    Objective:     Vitals:    02/09/23 1419   BP: (!) 201/81   Pulse: (!) 40   Temp: 97.4 °F (36.3 °C)         Physical Exam  Constitutional:       Appearance: He is well-developed.   HENT:      Head: Normocephalic and atraumatic.   Eyes:      Pupils: Pupils are equal, round, and reactive to light.   Cardiovascular:      Rate and Rhythm: Normal rate.   Pulmonary:      Effort: Pulmonary effort is normal. No respiratory distress.      Breath sounds: No stridor.   Abdominal:      General: There is no distension.   Musculoskeletal:      Cervical back: Normal range of motion.   Skin:     Comments: See Synopsis for wound details   Neurological:      Mental Status: He is alert and oriented to person, place, and time.          Altered Skin Integrity 09/22/22 0130 Left posterior Calf (Active)   09/22/22 0130   Altered Skin Integrity Present on Admission:    Side: Left   Orientation: posterior   Location: Calf   Wound Number:    Is this injury device related?:    Primary Wound Type:    Description of Altered Skin Integrity:    Ankle-Brachial Index:    Pulses: positive with doppler   Removal Indication and Assessment:    Wound Outcome:    (Retired) Wound Length (cm):    (Retired) Wound Width (cm):    (Retired) Depth (cm):    Wound Description (Comments):    Removal Indications:    Wound Image   02/09/23 1421   Dressing Appearance Moist drainage 02/09/23 1421   Drainage Amount  Moderate 02/09/23 1421   Drainage Characteristics/Odor Serous 02/09/23 1421   Tissue loss description Full thickness 02/09/23 1421   Red (%), Wound Tissue Color 100 % 02/09/23 1421   Periwound Area Macerated;Moist 02/09/23 1421   Wound Edges Open 02/09/23 1421   Wound Length (cm) 0.8 cm 02/09/23 1421   Wound Width (cm) 1 cm 02/09/23 1421   Wound Depth (cm) 0.3 cm 02/09/23 1421   Wound Volume (cm^3) 0.24 cm^3 02/09/23 1421   Wound Surface Area (cm^2) 0.8 cm^2 02/09/23 1421   Care Cleansed with:;Soap and water;Sterile normal saline;Debrided 02/09/23 1421   Dressing Applied;Calcium alginate 02/09/23 1421   Periwound Care Absorptive dressing applied 02/09/23 1421   Compression Four layer compression 02/09/23 1421   Dressing Change Due 02/13/23 02/09/23 1421            Altered Skin Integrity 01/05/23 1512 Right lower;lateral Leg (Active)   01/05/23 1512   Altered Skin Integrity Present on Admission: yes   Side: Right   Orientation: lower;lateral   Location: Leg   Wound Number:    Is this injury device related?:    Primary Wound Type:    Description of Altered Skin Integrity:    Ankle-Brachial Index:    Pulses:    Removal Indication and Assessment:    Wound Outcome:    (Retired) Wound Length (cm):    (Retired) Wound Width (cm):    (Retired) Depth (cm):    Wound Description (Comments):    Removal Indications:    Wound Image   02/09/23 1421   Dressing Appearance Moist drainage 02/09/23 1421   Drainage Amount Moderate 02/09/23 1421   Drainage Characteristics/Odor Serous 02/09/23 1421   Appearance Pink 02/09/23 1421   Tissue loss description Partial thickness 02/09/23 1421   Red (%), Wound Tissue Color 100 % 02/09/23 1421   Periwound Area Dry 02/09/23 1421   Wound Edges Defined 02/09/23 1421   Wound Length (cm) 6 cm 02/09/23 1421   Wound Width (cm) 13 cm 02/09/23 1421   Wound Depth (cm) 0.1 cm 02/09/23 1421   Wound Volume (cm^3) 7.8 cm^3 02/09/23 1421   Wound Surface Area (cm^2) 78 cm^2 02/09/23 1421   Care Cleansed  with:;Soap and water;Sterile normal saline;Moisturizing agent 02/09/23 1421   Dressing Applied;Calcium alginate 02/09/23 1421   Periwound Care Absorptive dressing applied 02/09/23 1421   Compression Four layer compression 02/09/23 1421   Dressing Change Due 02/13/23 02/09/23 1421            (Retired) Wound 09/22/22 1333 Diabetic Ulcer Left Heel (Active)   09/22/22 1333    Pre-existing: Yes   Primary Wound Type: Diabetic ulc   Side: Left   Orientation:    Location: Heel   Wound Number:    Ankle-Brachial Index:    Pulses:    Removal Indication and Assessment:    Wound Outcome:    (Retired) Wound Type:    (Retired) Wound Length (cm):    (Retired) Wound Width (cm):    (Retired) Depth (cm):    Wound Description (Comments):    Removal Indications:    Wound Image   02/09/23 1421   Dressing Appearance Moist drainage 02/09/23 1421   Drainage Amount Moderate 02/09/23 1421   Drainage Characteristics/Odor Serous 02/09/23 1421   Appearance Moist;White 02/09/23 1421   Tissue loss description Full thickness 02/09/23 1421   Red (%), Wound Tissue Color 100 % 02/09/23 1421   Periwound Area Macerated;Moist 02/09/23 1421   Wound Edges Open 02/09/23 1421   Wound Length (cm) 1 cm 02/09/23 1421   Wound Width (cm) 1.8 cm 02/09/23 1421   Wound Depth (cm) 0.2 cm 02/09/23 1421   Wound Volume (cm^3) 0.36 cm^3 02/09/23 1421   Wound Surface Area (cm^2) 1.8 cm^2 02/09/23 1421   Care Cleansed with:;Soap and water;Sterile normal saline 02/09/23 1421   Dressing Applied;Calcium alginate 02/09/23 1421   Periwound Care Absorptive dressing applied 02/09/23 1421   Compression Four layer compression 02/09/23 1421   Dressing Change Due 02/13/23 02/09/23 1421       [REMOVED]      Altered Skin Integrity 01/05/23 1512 Left anterior;lower Leg Venous Ulcer (Removed)   01/05/23 1512   Altered Skin Integrity Present on Admission: yes   Side: Left   Orientation: anterior;lower   Location: Leg   Wound Number:    Is this injury device related?:    Primary Wound  Type: Venous ulcer   Description of Altered Skin Integrity:    Ankle-Brachial Index:    Pulses:    Removal Indication and Assessment:    Wound Outcome:    (Retired) Wound Length (cm):    (Retired) Wound Width (cm):    (Retired) Depth (cm):    Wound Description (Comments):    Removal Indications:    Removed 02/09/23 1427   Wound Image   02/09/23 1421   Wound Length (cm) 0 cm 02/09/23 1421   Wound Width (cm) 0 cm 02/09/23 1421   Wound Depth (cm) 0 cm 02/09/23 1421   Wound Volume (cm^3) 0 cm^3 02/09/23 1421   Wound Surface Area (cm^2) 0 cm^2 02/09/23 1421   Care Cleansed with:;Soap and water;Sterile normal saline 02/09/23 1421   Dressing Applied;Calcium alginate 02/09/23 1421   Periwound Care Absorptive dressing applied 02/09/23 1421   Compression Four layer compression 02/09/23 1421         Assessment/Plan:         ICD-10-CM ICD-9-CM   1. Nonhealing ulcer of right lower leg with fat layer exposed  L97.912 707.19   2. Nonhealing ulcer of left lower leg with fat layer exposed  L97.922 707.19   3. Edema of leg  R60.0 782.3   4. Status post partial amputation of left foot  Z89.432 V49.73           Tissue pathology and/or culture taken:  [] Yes [x] No   Sharp debridement performed:   [x] Yes [] No   Labs ordered this visit:   [] Yes [x] No   Imaging ordered this visit:   [] Yes [x] No           Orders Placed This Encounter   Procedures    Change dressing     Right lateral leg, left posterior calf and left heel:     Cleanse wound with: Normal Saline   Lidocaine: PRN   Maru Wound: Lac Hydrin/ escribed to patient pharmacy   Primary dressing: Calcium alginate packed into wound bed  Secondary dressing: ABD pad   Edema control: Profore 4-layer dressing toes to knee bilateral legs   Frequency: Mondays, Thursdays and PRN   Follow-up: Dr. Farrell in 3 weeks    Home Health: [facility] to change dressing as ordered above twice weekly except when patient is in clinic.        Follow up in about 3 weeks (around 3/2/2023) for   [facility].

## 2023-02-14 PROBLEM — S91.302A NON HEALING LEFT HEEL WOUND: Status: ACTIVE | Noted: 2023-02-14

## 2023-02-14 NOTE — PROCEDURES
"Debridement    Date/Time: 2/9/2023 12:30 PM  Performed by: Anuja Farrell DO  Authorized by: Anuja Farrell DO     Time out: Immediately prior to procedure a "time out" was called to verify the correct patient, procedure, equipment, support staff and site/side marked as required.    Consent Done?:  Yes (Written)  Local anesthesia used?: Yes    Local anesthetic:  Topical anesthetic    Debridement - 1st Wound - General Location: L posterior calf.    Type of Debridement:  Excisional       Length (cm):  0.8       Area (sq cm):  0.8       Width (cm):  1       Percent Debrided (%):  100       Depth (cm):  0.3       Total Area Debrided (sq cm):  0.8    Depth of debridement:  Subcutaneous tissue    Tissue debrided:  Subcutaneous    Devitalized tissue debrided:  Slough, Fibrin, Exudate and Biofilm    Instruments:  Curette    Bleeding:  Minimal  Hemostasis Achieved: Yes    Method Used:  Pressure  Patient tolerance:  Patient tolerated the procedure well with no immediate complications  "

## 2023-03-09 ENCOUNTER — HOSPITAL ENCOUNTER (INPATIENT)
Facility: HOSPITAL | Age: 73
LOS: 11 days | Discharge: NURSING, REHAB OR LONG-TERM CARE | DRG: 629 | End: 2023-03-22
Attending: EMERGENCY MEDICINE | Admitting: INTERNAL MEDICINE
Payer: MEDICARE

## 2023-03-09 ENCOUNTER — HOSPITAL ENCOUNTER (OUTPATIENT)
Dept: WOUND CARE | Facility: HOSPITAL | Age: 73
Discharge: HOME | End: 2023-03-09
Attending: SURGERY
Payer: MEDICARE

## 2023-03-09 VITALS
TEMPERATURE: 98 F | DIASTOLIC BLOOD PRESSURE: 74 MMHG | SYSTOLIC BLOOD PRESSURE: 170 MMHG | BODY MASS INDEX: 40.58 KG/M2 | WEIGHT: 274 LBS | HEIGHT: 69 IN | HEART RATE: 47 BPM

## 2023-03-09 DIAGNOSIS — M86.171 ACUTE OSTEOMYELITIS OF RIGHT ANKLE: ICD-10-CM

## 2023-03-09 DIAGNOSIS — S91.302A NON HEALING LEFT HEEL WOUND: ICD-10-CM

## 2023-03-09 DIAGNOSIS — E11.41 DIABETIC MONONEUROPATHY ASSOCIATED WITH TYPE 2 DIABETES MELLITUS: Chronic | ICD-10-CM

## 2023-03-09 DIAGNOSIS — M86.179 OTHER ACUTE OSTEOMYELITIS, UNSPECIFIED ANKLE AND FOOT: ICD-10-CM

## 2023-03-09 DIAGNOSIS — L03.90 CELLULITIS, UNSPECIFIED CELLULITIS SITE: Primary | ICD-10-CM

## 2023-03-09 DIAGNOSIS — L89.514 DECUBITUS ULCER OF RIGHT ANKLE, STAGE 4: Primary | ICD-10-CM

## 2023-03-09 DIAGNOSIS — M86.272 SUBACUTE OSTEOMYELITIS OF LEFT FOOT: ICD-10-CM

## 2023-03-09 DIAGNOSIS — L97.922 NONHEALING ULCER OF LEFT LOWER LEG WITH FAT LAYER EXPOSED: ICD-10-CM

## 2023-03-09 DIAGNOSIS — R60.0 EDEMA OF LEG: ICD-10-CM

## 2023-03-09 DIAGNOSIS — S81.809A: ICD-10-CM

## 2023-03-09 DIAGNOSIS — M86.672 CHRONIC OSTEOMYELITIS OF ANKLE AND FOOT, LEFT: ICD-10-CM

## 2023-03-09 DIAGNOSIS — Z89.432 STATUS POST TRANSMETATARSAL AMPUTATION OF FOOT, LEFT: ICD-10-CM

## 2023-03-09 DIAGNOSIS — I49.9 ARRHYTHMIA: ICD-10-CM

## 2023-03-09 DIAGNOSIS — R78.81 BACTEREMIA: ICD-10-CM

## 2023-03-09 DIAGNOSIS — M86.172 ACUTE OSTEOMYELITIS OF CALCANEUM, LEFT: ICD-10-CM

## 2023-03-09 DIAGNOSIS — L97.912 NONHEALING ULCER OF RIGHT LOWER LEG WITH FAT LAYER EXPOSED: ICD-10-CM

## 2023-03-09 DIAGNOSIS — L89.624 DECUBITUS ULCER OF LEFT HEEL, STAGE 4: ICD-10-CM

## 2023-03-09 DIAGNOSIS — R32 URINARY INCONTINENCE, UNSPECIFIED TYPE: ICD-10-CM

## 2023-03-09 DIAGNOSIS — M86.072 ACUTE HEMATOGENOUS OSTEOMYELITIS OF LEFT FOOT: ICD-10-CM

## 2023-03-09 DIAGNOSIS — R00.1 BRADYCARDIA: ICD-10-CM

## 2023-03-09 LAB
ALBUMIN SERPL BCP-MCNC: 2.4 G/DL (ref 3.5–5.2)
ALP SERPL-CCNC: 71 U/L (ref 55–135)
ALT SERPL W/O P-5'-P-CCNC: 28 U/L (ref 10–44)
ANION GAP SERPL CALC-SCNC: 12 MMOL/L (ref 8–16)
AST SERPL-CCNC: 32 U/L (ref 10–40)
BASOPHILS # BLD AUTO: 0.03 K/UL (ref 0–0.2)
BASOPHILS NFR BLD: 0.3 % (ref 0–1.9)
BILIRUB SERPL-MCNC: 0.4 MG/DL (ref 0.1–1)
BUN SERPL-MCNC: 18 MG/DL (ref 8–23)
CALCIUM SERPL-MCNC: 9.2 MG/DL (ref 8.7–10.5)
CHLORIDE SERPL-SCNC: 101 MMOL/L (ref 95–110)
CO2 SERPL-SCNC: 25 MMOL/L (ref 23–29)
CREAT SERPL-MCNC: 1.4 MG/DL (ref 0.5–1.4)
DIFFERENTIAL METHOD: ABNORMAL
EOSINOPHIL # BLD AUTO: 0.2 K/UL (ref 0–0.5)
EOSINOPHIL NFR BLD: 1.4 % (ref 0–8)
ERYTHROCYTE [DISTWIDTH] IN BLOOD BY AUTOMATED COUNT: 15.9 % (ref 11.5–14.5)
EST. GFR  (NO RACE VARIABLE): 53 ML/MIN/1.73 M^2
GLUCOSE SERPL-MCNC: 107 MG/DL (ref 70–110)
HCT VFR BLD AUTO: 29.7 % (ref 40–54)
HGB BLD-MCNC: 9.4 G/DL (ref 14–18)
IMM GRANULOCYTES # BLD AUTO: 0.05 K/UL (ref 0–0.04)
IMM GRANULOCYTES NFR BLD AUTO: 0.5 % (ref 0–0.5)
LACTATE SERPL-SCNC: 1.5 MMOL/L (ref 0.5–2.2)
LYMPHOCYTES # BLD AUTO: 2.2 K/UL (ref 1–4.8)
LYMPHOCYTES NFR BLD: 20 % (ref 18–48)
MCH RBC QN AUTO: 26.7 PG (ref 27–31)
MCHC RBC AUTO-ENTMCNC: 31.6 G/DL (ref 32–36)
MCV RBC AUTO: 84 FL (ref 82–98)
MONOCYTES # BLD AUTO: 0.7 K/UL (ref 0.3–1)
MONOCYTES NFR BLD: 6.2 % (ref 4–15)
NEUTROPHILS # BLD AUTO: 7.8 K/UL (ref 1.8–7.7)
NEUTROPHILS NFR BLD: 71.6 % (ref 38–73)
NRBC BLD-RTO: 0 /100 WBC
PLATELET # BLD AUTO: 374 K/UL (ref 150–450)
PMV BLD AUTO: 10.6 FL (ref 9.2–12.9)
POCT GLUCOSE: 100 MG/DL (ref 70–110)
POTASSIUM SERPL-SCNC: 3.8 MMOL/L (ref 3.5–5.1)
PROT SERPL-MCNC: 8.4 G/DL (ref 6–8.4)
RBC # BLD AUTO: 3.52 M/UL (ref 4.6–6.2)
SODIUM SERPL-SCNC: 138 MMOL/L (ref 136–145)
WBC # BLD AUTO: 10.86 K/UL (ref 3.9–12.7)

## 2023-03-09 PROCEDURE — 25000003 PHARM REV CODE 250: Performed by: STUDENT IN AN ORGANIZED HEALTH CARE EDUCATION/TRAINING PROGRAM

## 2023-03-09 PROCEDURE — 99285 EMERGENCY DEPT VISIT HI MDM: CPT | Mod: 25,27

## 2023-03-09 PROCEDURE — 87150 DNA/RNA AMPLIFIED PROBE: CPT | Mod: 59 | Performed by: PHYSICIAN ASSISTANT

## 2023-03-09 PROCEDURE — 87077 CULTURE AEROBIC IDENTIFY: CPT | Performed by: PHYSICIAN ASSISTANT

## 2023-03-09 PROCEDURE — 80053 COMPREHEN METABOLIC PANEL: CPT | Performed by: EMERGENCY MEDICINE

## 2023-03-09 PROCEDURE — G0378 HOSPITAL OBSERVATION PER HR: HCPCS

## 2023-03-09 PROCEDURE — 25000003 PHARM REV CODE 250: Performed by: EMERGENCY MEDICINE

## 2023-03-09 PROCEDURE — 83605 ASSAY OF LACTIC ACID: CPT | Performed by: EMERGENCY MEDICINE

## 2023-03-09 PROCEDURE — 85025 COMPLETE CBC W/AUTO DIFF WBC: CPT | Performed by: PHYSICIAN ASSISTANT

## 2023-03-09 PROCEDURE — 87186 SC STD MICRODIL/AGAR DIL: CPT | Performed by: PHYSICIAN ASSISTANT

## 2023-03-09 PROCEDURE — 99214 OFFICE O/P EST MOD 30 MIN: CPT

## 2023-03-09 PROCEDURE — 63600175 PHARM REV CODE 636 W HCPCS: Performed by: EMERGENCY MEDICINE

## 2023-03-09 PROCEDURE — 87040 BLOOD CULTURE FOR BACTERIA: CPT | Performed by: PHYSICIAN ASSISTANT

## 2023-03-09 PROCEDURE — 96372 THER/PROPH/DIAG INJ SC/IM: CPT | Performed by: STUDENT IN AN ORGANIZED HEALTH CARE EDUCATION/TRAINING PROGRAM

## 2023-03-09 RX ORDER — DEXTROSE 40 %
30 GEL (GRAM) ORAL
Status: DISCONTINUED | OUTPATIENT
Start: 2023-03-09 | End: 2023-03-22 | Stop reason: HOSPADM

## 2023-03-09 RX ORDER — NIFEDIPINE 60 MG/1
60 TABLET, EXTENDED RELEASE ORAL DAILY
COMMUNITY
Start: 2023-02-06 | End: 2023-10-02

## 2023-03-09 RX ORDER — SODIUM CHLORIDE 0.9 % (FLUSH) 0.9 %
10 SYRINGE (ML) INJECTION EVERY 12 HOURS PRN
Status: DISCONTINUED | OUTPATIENT
Start: 2023-03-09 | End: 2023-03-22 | Stop reason: HOSPADM

## 2023-03-09 RX ORDER — LOSARTAN POTASSIUM AND HYDROCHLOROTHIAZIDE 25; 100 MG/1; MG/1
1 TABLET ORAL DAILY
Status: ON HOLD | COMMUNITY
Start: 2023-01-23 | End: 2023-10-16 | Stop reason: HOSPADM

## 2023-03-09 RX ORDER — NALOXONE HCL 0.4 MG/ML
0.02 VIAL (ML) INJECTION
Status: DISCONTINUED | OUTPATIENT
Start: 2023-03-09 | End: 2023-03-22 | Stop reason: HOSPADM

## 2023-03-09 RX ORDER — METFORMIN HYDROCHLORIDE 1000 MG/1
1000 TABLET, FILM COATED, EXTENDED RELEASE ORAL DAILY
Status: ON HOLD | COMMUNITY
Start: 2023-01-17 | End: 2023-10-16 | Stop reason: HOSPADM

## 2023-03-09 RX ORDER — ASCORBIC ACID 500 MG
500 TABLET ORAL DAILY
Status: DISCONTINUED | OUTPATIENT
Start: 2023-03-10 | End: 2023-03-22 | Stop reason: HOSPADM

## 2023-03-09 RX ORDER — METFORMIN HYDROCHLORIDE EXTENDED-RELEASE TABLETS 1000 MG/1
TABLET, FILM COATED, EXTENDED RELEASE ORAL 2 TIMES DAILY
COMMUNITY
Start: 2023-01-03 | End: 2023-03-09 | Stop reason: DRUGHIGH

## 2023-03-09 RX ORDER — FUROSEMIDE 40 MG/1
40 TABLET ORAL
Status: DISCONTINUED | OUTPATIENT
Start: 2023-03-09 | End: 2023-03-13

## 2023-03-09 RX ORDER — ATORVASTATIN CALCIUM 40 MG/1
80 TABLET, FILM COATED ORAL DAILY
Status: DISCONTINUED | OUTPATIENT
Start: 2023-03-10 | End: 2023-03-22 | Stop reason: HOSPADM

## 2023-03-09 RX ORDER — HYDROCHLOROTHIAZIDE 25 MG/1
25 TABLET ORAL DAILY
Status: DISCONTINUED | OUTPATIENT
Start: 2023-03-10 | End: 2023-03-22 | Stop reason: HOSPADM

## 2023-03-09 RX ORDER — DEXTROSE 40 %
15 GEL (GRAM) ORAL
Status: DISCONTINUED | OUTPATIENT
Start: 2023-03-09 | End: 2023-03-22 | Stop reason: HOSPADM

## 2023-03-09 RX ORDER — GLUCAGON 1 MG
1 KIT INJECTION
Status: DISCONTINUED | OUTPATIENT
Start: 2023-03-09 | End: 2023-03-22 | Stop reason: HOSPADM

## 2023-03-09 RX ORDER — GLIPIZIDE 10 MG/1
20 TABLET ORAL 2 TIMES DAILY
Status: ON HOLD | COMMUNITY
Start: 2023-02-15 | End: 2024-03-17 | Stop reason: HOSPADM

## 2023-03-09 RX ORDER — INSULIN ASPART 100 [IU]/ML
0-5 INJECTION, SOLUTION INTRAVENOUS; SUBCUTANEOUS
Status: DISCONTINUED | OUTPATIENT
Start: 2023-03-09 | End: 2023-03-12

## 2023-03-09 RX ORDER — INSULIN ASPART 100 [IU]/ML
20 INJECTION, SOLUTION INTRAVENOUS; SUBCUTANEOUS
Status: DISCONTINUED | OUTPATIENT
Start: 2023-03-10 | End: 2023-03-15

## 2023-03-09 RX ORDER — HYDRALAZINE HYDROCHLORIDE 25 MG/1
25 TABLET, FILM COATED ORAL 3 TIMES DAILY
Status: DISCONTINUED | OUTPATIENT
Start: 2023-03-09 | End: 2023-03-22 | Stop reason: HOSPADM

## 2023-03-09 RX ORDER — AZITHROMYCIN 250 MG/1
TABLET, FILM COATED ORAL
COMMUNITY
Start: 2022-11-21 | End: 2023-03-09

## 2023-03-09 RX ORDER — NIFEDIPINE 30 MG/1
60 TABLET, EXTENDED RELEASE ORAL DAILY
Status: DISCONTINUED | OUTPATIENT
Start: 2023-03-10 | End: 2023-03-22 | Stop reason: HOSPADM

## 2023-03-09 RX ORDER — CLOPIDOGREL BISULFATE 75 MG/1
75 TABLET ORAL DAILY
Status: DISCONTINUED | OUTPATIENT
Start: 2023-03-10 | End: 2023-03-22 | Stop reason: HOSPADM

## 2023-03-09 RX ORDER — FUROSEMIDE 40 MG/1
40 TABLET ORAL DAILY
Status: ON HOLD | COMMUNITY
Start: 2023-01-17 | End: 2023-10-16 | Stop reason: HOSPADM

## 2023-03-09 RX ORDER — ISOSORBIDE DINITRATE 10 MG/1
10 TABLET ORAL 3 TIMES DAILY
Status: DISCONTINUED | OUTPATIENT
Start: 2023-03-09 | End: 2023-03-22 | Stop reason: HOSPADM

## 2023-03-09 RX ORDER — AZITHROMYCIN 500 MG/1
500 TABLET, FILM COATED ORAL
COMMUNITY
Start: 2022-09-15 | End: 2023-03-09

## 2023-03-09 RX ORDER — OXYCODONE HYDROCHLORIDE 5 MG/1
5 TABLET ORAL ONCE
Status: COMPLETED | OUTPATIENT
Start: 2023-03-09 | End: 2023-03-09

## 2023-03-09 RX ORDER — BENZONATATE 200 MG/1
200 CAPSULE ORAL
COMMUNITY
Start: 2022-11-21 | End: 2023-03-09

## 2023-03-09 RX ORDER — VITAMIN B COMPLEX
1 TABLET ORAL DAILY
Status: ON HOLD | COMMUNITY
Start: 2023-01-03

## 2023-03-09 RX ORDER — TAMSULOSIN HYDROCHLORIDE 0.4 MG/1
0.4 CAPSULE ORAL DAILY
Status: DISCONTINUED | OUTPATIENT
Start: 2023-03-10 | End: 2023-03-22 | Stop reason: HOSPADM

## 2023-03-09 RX ORDER — PANTOPRAZOLE SODIUM 40 MG/1
40 TABLET, DELAYED RELEASE ORAL DAILY
Status: ON HOLD | COMMUNITY
Start: 2023-02-06

## 2023-03-09 RX ORDER — ASPIRIN 81 MG/1
81 TABLET ORAL DAILY
Status: DISCONTINUED | OUTPATIENT
Start: 2023-03-10 | End: 2023-03-22 | Stop reason: HOSPADM

## 2023-03-09 RX ORDER — PANTOPRAZOLE SODIUM 40 MG/1
40 TABLET, DELAYED RELEASE ORAL DAILY
Status: DISCONTINUED | OUTPATIENT
Start: 2023-03-10 | End: 2023-03-22 | Stop reason: HOSPADM

## 2023-03-09 RX ORDER — GABAPENTIN 100 MG/1
200 CAPSULE ORAL 2 TIMES DAILY
Status: DISCONTINUED | OUTPATIENT
Start: 2023-03-09 | End: 2023-03-22 | Stop reason: HOSPADM

## 2023-03-09 RX ORDER — TAMSULOSIN HYDROCHLORIDE 0.4 MG/1
0.4 CAPSULE ORAL DAILY
Status: ON HOLD | COMMUNITY

## 2023-03-09 RX ORDER — ASPIRIN 81 MG/1
81 TABLET ORAL DAILY
Status: ON HOLD | COMMUNITY
End: 2023-05-25 | Stop reason: HOSPADM

## 2023-03-09 RX ORDER — LOSARTAN POTASSIUM 50 MG/1
100 TABLET ORAL DAILY
Status: DISCONTINUED | OUTPATIENT
Start: 2023-03-10 | End: 2023-03-22 | Stop reason: HOSPADM

## 2023-03-09 RX ADMIN — HYDRALAZINE HYDROCHLORIDE 25 MG: 25 TABLET, FILM COATED ORAL at 09:03

## 2023-03-09 RX ADMIN — FUROSEMIDE 40 MG: 40 TABLET ORAL at 09:03

## 2023-03-09 RX ADMIN — ISOSORBIDE DINITRATE 10 MG: 10 TABLET ORAL at 09:03

## 2023-03-09 RX ADMIN — OXYCODONE HYDROCHLORIDE 5 MG: 5 TABLET ORAL at 04:03

## 2023-03-09 RX ADMIN — GABAPENTIN 200 MG: 100 CAPSULE ORAL at 09:03

## 2023-03-09 RX ADMIN — INSULIN DETEMIR 35 UNITS: 100 INJECTION, SOLUTION SUBCUTANEOUS at 09:03

## 2023-03-09 RX ADMIN — VANCOMYCIN HYDROCHLORIDE 2500 MG: 500 INJECTION, POWDER, LYOPHILIZED, FOR SOLUTION INTRAVENOUS at 07:03

## 2023-03-09 RX ADMIN — APIXABAN 5 MG: 5 TABLET, FILM COATED ORAL at 09:03

## 2023-03-09 NOTE — ED NOTES
Pt arrived from home complains of right foot pain X3 weeks. Pt report that he notices this new wound due to pain 3 weeks ago. Pt has history of DM. Pt appears to be comfortable and in no distress.

## 2023-03-09 NOTE — ED PROVIDER NOTES
Emergency Department Encounter  Provider Note  Encounter Date: 3/9/2023    Patient Name: Saji Castañeda  MRN: [id number]    History of Present Illness   HPI  History of Present Illness:    Chief Complaint:   Chief Complaint   Patient presents with    Wound Infection     Pt sent from wound care appointment for worsening and new wounds. Bilateral dressings are in place. Wound care reported a new wound to right ankle with tendon exposure and a left heal wound with bone exposure.        73-year-old male with a history of hypertension, hyperlipidemia, CKD, peripheral vascular disease, chronic left lower extremity osteomyelitis, sent from Wound Care for concern of worsening wounds concerning for infection/bone and tendon involvement.  Difficult to obtain history from patient as he could not get off the phone as he was paying his water bill.  Patient complaining of pain.    The following PMH/PSH/SocHx/FamHx has been reviewed by myself:    Past Medical History:   Diagnosis Date    Arthritis     legs    Diabetes mellitus     Diabetes mellitus, type 2     Hyperlipidemia     Hypertension     Osteomyelitis      Past Surgical History:   Procedure Laterality Date    ANGIOGRAPHY OF LOWER EXTREMITY N/A 2/3/2021    Procedure: Angiogram Extremity Bilateral;  Surgeon: Ernst Chacko MD;  Location: [facility] OR [address] [place] [address];  Service: Peripheral Vascular;  Laterality: N/A;  7.4 mintues fluoroscopy time  816.15 mGy  170.17 Gycm2    AORTOGRAPHY WITH EXTREMITY RUNOFF Bilateral 2/3/2021    Procedure: AORTOGRAM, WITH EXTREMITY RUNOFF;  Surgeon: Ernst Chacko MD;  Location: [facility] OR [address] [place] [address];  Service: Peripheral Vascular;  Laterality: Bilateral;    DEBRIDEMENT OF FOOT Left 2/23/2021    Procedure: DEBRIDEMENT, LEFT HEEL;  Surgeon: Mayra Schroeder DPM;  Location: [facility] OR [address] [place] [address];  Service: Podiatry;  Laterality: Left;    FOOT AMPUTATION  October 2010    left high midfoot amputation     Social History     Tobacco Use    Smoking status:  "Never    Smokeless tobacco: Never   Substance Use Topics    Alcohol use: No     Comment: occassional    Drug use: No     Family History   Problem Relation Age of Onset    Diabetes Mother     Heart disease Mother     Stroke Sister     Cancer Brother     Diabetes Sister        Allergies reviewed:  Review of patient's allergies indicates:  No Known Allergies    Medications reviewed:  Medication List with Changes/Refills   Current Medications    ACETAMINOPHEN (TYLENOL) 500 MG TABLET    Take 1,000 mg by mouth every 8 (eight) hours as needed for Pain.    APIXABAN (ELIQUIS) 5 MG TAB    Take 1 tablet (5 mg total) by mouth 2 (two) times daily.    ASCORBIC ACID, VITAMIN C, (VITAMIN C) 500 MG TABLET    Take 500 mg by mouth once daily.    ASPIRIN (ECOTRIN) 81 MG EC TABLET    Take 1 tablet (81 mg total) by mouth once daily. for 6 days    BD ULTRA-FINE ADITYA PEN NEEDLE 32 GAUGE X 5/32" NDLE    USE FOUR TIMES DAILY WITH MEALS AND NIGHTLY    BLOOD SUGAR DIAGNOSTIC (CONTOUR TEST STRIPS) STRP    Inject 1 strip into the skin 2 (two) times daily before meals.    CLOPIDOGREL (PLAVIX) 75 MG TABLET    Take 1 tablet (75 mg total) by mouth once daily.    GABAPENTIN (NEURONTIN) 100 MG CAPSULE    Take 2 capsules (200 mg total) by mouth 2 (two) times daily.    HYDRALAZINE (APRESOLINE) 25 MG TABLET    Take 1 tablet (25 mg total) by mouth 3 (three) times daily.    INSULIN ASPART U-100 (NOVOLOG) 100 UNIT/ML (3 ML) INPN PEN    Inject 0-5 Units into the skin before meals and at bedtime as needed (Hyperglycemia). **LOW CORRECTION DOSE**  Blood Glucose  mg/dL                  Pre-meal                2200  151-200                0 unit                      0 unit  201-250                2 units                    1 unit  251-300                3 units                    1 unit  301-350                4 units                    2 units  >350                     5 units                    3 units  Administer subcutaneously if needed at times designated " by monitoring schedule.    INSULIN ASPART U-100 (NOVOLOG) 100 UNIT/ML (3 ML) INPN PEN    Inject 20 Units into the skin 3 (three) times daily with meals.    ISOSORBIDE DINITRATE (ISORDIL) 10 MG TABLET    Take 1 tablet (10 mg total) by mouth 3 (three) times daily.    LACTOBACILLUS RHAMNOSUS GG (CULTURELLE) 10 BILLION CELL CAPSULE    Take 1 capsule by mouth 2 (two) times daily.    LANTUS SOLOSTAR U-100 INSULIN GLARGINE 100 UNITS/ML SUBQ PEN    Inject 45 Units into the skin every evening.    MICROLET LANCET MISC        NIFEDIPINE (PROCARDIA-XL) 90 MG (OSM) 24 HR TABLET    Take 1 tablet (90 mg total) by mouth once daily.    ROSUVASTATIN (CRESTOR) 40 MG TAB    Take 40 mg by mouth once daily.    TAMSULOSIN (FLOMAX) 0.4 MG CAP    Take 1 capsule (0.4 mg total) by mouth once daily.       ROS  Review of Systems:    Constitutional:  Negative for fever.   HENT:  Negative for sore throat.    Eyes:  Negative for redness.   Respiratory:  Negative for shortness of breath.    Cardiovascular:  Negative for chest pain.   Gastrointestinal:  Negative for nausea.   Genitourinary:  Negative for dysuria.   Musculoskeletal:  Negative for back pain.   Skin:  Negative for rash.   Neurological:  Negative for weakness.   Hematological:  Does not bruise/bleed easily.   Psychiatric/Behavioral:  The patient is not nervous/anxious.      Physical Exam   Physical Exam    Initial Vitals [03/09/23 1439]   BP Pulse Resp Temp SpO2   (!) 184/84 (!) 51 18 99 °F (37.2 °C) 100 %      MAP       --           Triage vital signs reviewed.    Constitutional: Well-nourished, well-developed. Not in acute distress.  Malodorous.  HENT: Normocephalic, atraumatic. Moist mucous membranes.  Eyes: No conjunctival injection.  Resp: Normal respiratory effort, breathing unlabored.  Cardio: Regular rate and rhythm.  GI: Abdomen non-distended.   MSK:  Bilateral lower extremities wrapped.  Thick scaly skin at bilateral shins.  Please see wound care notes from today for pictures  of his wounds.  Skin: Warm and dry.   Neuro: Awake and alert. Moves all extremities.    ED Course   Procedures    Medical Decision Making    History Acquisition     The history is provided by the patient.   Assessment requiring an independent historian and why historian was needed:  Wound care notes, patient unwilling to get off the phone for evaluation    Review of prior external/non ED notes:  Wound care notes    Differential Diagnoses   Based on available information and initial assessment, the working differential diagnoses considered during this evaluation include but are not limited to   Cellulitis, abscess, necrotizing fasciitis, osteomyelitis, septic joint, MRSA, DVT, drug eruption, allergic/contact dermatitis, EM/SJS, viral exanthem, local trauma/contusion      EKG       Labs   Lab tests ordered and independently reviewed by me:    Labs Reviewed   CBC W/ AUTO DIFFERENTIAL - Abnormal; Notable for the following components:       Result Value    RBC 3.52 (*)     Hemoglobin 9.4 (*)     Hematocrit 29.7 (*)     MCH 26.7 (*)     MCHC 31.6 (*)     RDW 15.9 (*)     Gran # (ANC) 7.8 (*)     Immature Grans (Abs) 0.05 (*)     All other components within normal limits   COMPREHENSIVE METABOLIC PANEL - Abnormal; Notable for the following components:    Albumin 2.4 (*)     eGFR 53 (*)     All other components within normal limits   CULTURE, BLOOD   CULTURE, BLOOD   LACTIC ACID, PLASMA         Imaging   Imaging ordered and independently reviewed by me:   Imaging Results              MRI Ankle Without Contrast Right (In process)                      X-Ray Ankle Complete Right (Final result)  Result time 03/09/23 16:51:54      Final result by Juan Hernandez MD (03/09/23 16:51:54)                   Impression:      Distal right lower leg, ankle and hindfoot diffuse nonspecific soft tissue swelling from edema or cellulitis with superimposed more prominent soft tissue swelling along the anterolateral aspect of the distal lower  leg.  Clinical correlation advised.    Additional chronic appearing findings as above.      Electronically signed by: Juan Hernandez MD  Date:    03/09/2023  Time:    16:51               Narrative:    EXAMINATION:  XR ANKLE COMPLETE 3 VIEW RIGHT    CLINICAL HISTORY:  Unspecified open wound, unspecified lower leg, initial encounter    TECHNIQUE:  AP, lateral, and oblique images of the right ankle were performed.    COMPARISON:  Right foot series 02/23/2022    FINDINGS:  Chronic appearing deformity of the mid to distal femoral shaft presumably sequela of remote trauma.  No acute displaced fracture, dislocation or destructive osseous process.  There is DJD.    Nonspecific soft tissue swelling about the distal right lower leg, ankle and dorsal hindfoot particular along the anterior and lateral aspect likely representing cellulitis.  No large soft tissue defect or subcutaneous emphysema seen.  Scattered atherosclerotic vascular and also nonspecific soft tissue calcifications noted.  No radiodense retained foreign body.  Small enthesophyte at the Achilles insertion site.                                       Xr w/o fracture, viewed Rads read     Additional Consideration   Saji Castañeda  presents to the emergency Department today with concern for worsening bilateral lower extremity wounds.  No fever noted.  Patient is sent from Wound Care for evaluation.  Most likely will need admission for antibiotics and podiatry evaluation.    Additional testing considered but not indicated during the course of this workup: further imaging and labwork, not indicated  Co-morbidities/chronic illness/exacerbation of chronic illness considered which impacted clinical decision making:  Advanced age, hypertension, hyperlipidemia, PVD.  Procedures done in the ED or plan for the OR: Yes, describe:  Possible debridement.  Social determinants of care considered during development of treatment plan include: Decreased medical literacy and Financial  insecurities   Discussion of management or test interpretation with external provider: Yes, describe: admitting team  DNR discussion: No    The patient's list of active medications and allergies as documented per RN staff has been reviewed.  Medications given in the ED and/or prescribed:   Medications   vancomycin (VANCOCIN) 2,500 mg in dextrose 5 % (D5W) 500 mL IVPB (has no administration in time range)   oxyCODONE immediate release tablet 5 mg (5 mg Oral Given 3/9/23 5468)             ED Course as of 03/09/23 1826   Thu Mar 09, 2023   1825 Comprehensive metabolic panel(!) [CS]   1825 CBC auto differential(!) [CS]      ED Course User Index  [CS] Latoya Seay MD       Explanation of disposition: admission for MRI, wound evaluation, cellulitis      Clinical Impression:     1. Cellulitis, unspecified cellulitis site    2. Open wound, lower leg         ED Disposition Condition    Observation Stable               Latoya Seay MD  03/09/23 1826

## 2023-03-09 NOTE — PROGRESS NOTES
[facility]    Subjective:       Patient ID: Saji Castañeda is a 73 y.o. male.    Chief Complaint: Non-healing Wound Follow Up    3/9/23 Patient seen for non-healing BLE wounds.  Of his past 6 appointments, he arrived for 2 appts, canceled 2 appts and no-showed for 2 appts. He was last seen in the  clinic on 2/9/23. He arrived 3/9/2023 with a new right anterior ankle wounds with tendon exposed, new left plantar foot wound and palpable bone now present in left heel wound. These were new findings since last visit.  Patient reports increased pain at right anterior foot/ankle at the site of the new wound.  Pt reports the R anterior ankle pain/wound for 1 week. We were not notified of this new wound so unsure on the timing and if HH was aware. We recommended urgent ER evaluation, treatment with possible IV antibiotics, probable imaging and possible OR debridement discussed.  Patient transported to ER.  Charge nurse report given.   from [employer] [facility] updated regarding new wounds and sending patient to ER. Of note, patient was incontinent of stool and urine upon arrival to  today.    Review of Systems   All systems were reviewed and are negative, except that mentioned in the HPI.    Objective:     Vitals:    03/09/23 1318   BP: (!) 170/74   Pulse: (!) 47   Temp: 98.1 °F (36.7 °C)         Physical Exam  Constitutional:       Appearance: He is well-developed.   HENT:      Head: Normocephalic and atraumatic.   Eyes:      Pupils: Pupils are equal, round, and reactive to light.   Cardiovascular:      Rate and Rhythm: Normal rate.   Pulmonary:      Effort: Pulmonary effort is normal. No respiratory distress.      Breath sounds: No stridor.   Abdominal:      General: There is no distension.   Musculoskeletal:      Cervical back: Normal range of motion.   Skin:     Comments: See Synopsis for wound details   Neurological:      Mental Status: He is alert and  oriented to person, place, and time.          Altered Skin Integrity 09/22/22 0130 Left posterior Calf (Active)   09/22/22 0130   Altered Skin Integrity Present on Admission - Did Patient arrive to the hospital with altered skin?:    Side: Left   Orientation: posterior   Location: Calf   Wound Number:    Is this injury device related?:    Primary Wound Type:    Description of Altered Skin Integrity:    Ankle-Brachial Index:    Pulses: positive with doppler   Removal Indication and Assessment:    Wound Outcome:    (Retired) Wound Length (cm):    (Retired) Wound Width (cm):    (Retired) Depth (cm):    Wound Description (Comments):    Removal Indications:    Wound Image   03/09/23 1343   Dressing Appearance Moist drainage 03/09/23 1343   Drainage Amount Small 03/09/23 1343   Drainage Characteristics/Odor Serous 03/09/23 1343   Appearance White 03/09/23 1343   Tissue loss description Full thickness 03/09/23 1343   Yellow (%), Wound Tissue Color 100 % 03/09/23 1343   Wound Edges Open 03/09/23 1343   Wound Length (cm) 0.5 cm 03/09/23 1343   Wound Width (cm) 0.8 cm 03/09/23 1343   Wound Depth (cm) 0.1 cm 03/09/23 1343   Wound Volume (cm^3) 0.04 cm^3 03/09/23 1343   Wound Surface Area (cm^2) 0.4 cm^2 03/09/23 1343   Care Cleansed with:;Soap and water;Sterile normal saline 03/09/23 1343   Dressing Applied;Other (comment);Gauze 03/09/23 1343            Altered Skin Integrity 03/09/23 1347 Right anterior Ankle (Active)   03/09/23 1347   Altered Skin Integrity Present on Admission - Did Patient arrive to the hospital with altered skin?: yes   Side: Right   Orientation: anterior   Location: Ankle   Wound Number:    Is this injury device related?:    Primary Wound Type:    Description of Altered Skin Integrity:    Ankle-Brachial Index:    Pulses:    Removal Indication and Assessment:    Wound Outcome:    (Retired) Wound Length (cm):    (Retired) Wound Width (cm):    (Retired) Depth (cm):    Wound Description (Comments):     Removal Indications:    Wound Image   03/09/23 1343   Description of Altered Skin Integrity Full thickness tissue loss. Base is covered by slough and/or eschar in the wound bed 03/09/23 1343   Dressing Appearance Moist drainage 03/09/23 1343   Drainage Amount Large 03/09/23 1343   Drainage Characteristics/Odor Purulent;Malodorous 03/09/23 1343   Appearance Tendon;Liang 03/09/23 1343   Tissue loss description Full thickness 03/09/23 1343   Black (%), Wound Tissue Color 100 % 03/09/23 1343   Periwound Area Intact;Dry 03/09/23 1343   Wound Edges Open;Defined 03/09/23 1343   Wound Length (cm) 3.5 cm 03/09/23 1343   Wound Width (cm) 4.6 cm 03/09/23 1343   Wound Depth (cm) 0.8 cm 03/09/23 1343   Wound Volume (cm^3) 12.88 cm^3 03/09/23 1343   Wound Surface Area (cm^2) 16.1 cm^2 03/09/23 1343   Care Cleansed with:;Soap and water;Sterile normal saline 03/09/23 1343   Dressing Applied;Cast padding 03/09/23 1343            Altered Skin Integrity 03/09/23 1349 Left plantar Foot (Active)   03/09/23 1349   Altered Skin Integrity Present on Admission - Did Patient arrive to the hospital with altered skin?: yes   Side: Left   Orientation: plantar   Location: Foot   Wound Number:    Is this injury device related?:    Primary Wound Type:    Description of Altered Skin Integrity:    Ankle-Brachial Index:    Pulses:    Removal Indication and Assessment:    Wound Outcome:    (Retired) Wound Length (cm):    (Retired) Wound Width (cm):    (Retired) Depth (cm):    Wound Description (Comments):    Removal Indications:    Description of Altered Skin Integrity Full thickness tissue loss. Subcutaneous fat may be visible but bone, tendon or muscle are not exposed 03/09/23 1343   Dressing Appearance Moist drainage 03/09/23 1343   Drainage Amount Moderate 03/09/23 1343   Drainage Characteristics/Odor Sanguineous 03/09/23 1343   Appearance Red;Wet;Bleeding 03/09/23 1343   Tissue loss description Full thickness 03/09/23 1343   Red (%), Wound  Tissue Color 100 % 03/09/23 1343   Periwound Area Macerated;Moist;Pale white 03/09/23 1343   Wound Edges Irregular;Jagged 03/09/23 1343   Wound Length (cm) 3 cm 03/09/23 1343   Wound Width (cm) 2.5 cm 03/09/23 1343   Wound Depth (cm) 0.7 cm 03/09/23 1343   Wound Volume (cm^3) 5.25 cm^3 03/09/23 1343   Wound Surface Area (cm^2) 7.5 cm^2 03/09/23 1343   Care Cleansed with:;Soap and water;Sterile normal saline 03/09/23 1343   Dressing Applied;Other (comment);Gauze;Absorptive Pad;Cast padding 03/09/23 1343            (Retired) Wound 09/22/22 1333 Diabetic Ulcer Left Heel (Active)   09/22/22 1333    Pre-existing: Yes   Primary Wound Type: Diabetic ulc   Side: Left   Orientation:    Location: Heel   Wound Number:    Ankle-Brachial Index:    Pulses:    Removal Indication and Assessment:    Wound Outcome:    (Retired) Wound Type:    (Retired) Wound Length (cm):    (Retired) Wound Width (cm):    (Retired) Depth (cm):    Wound Description (Comments):    Removal Indications:    Wound Image   03/09/23 1343   Dressing Appearance Moist drainage 03/09/23 1343   Drainage Amount Large 03/09/23 1343   Drainage Characteristics/Odor Serosanguineous 03/09/23 1343   Appearance Pink;White;Moist;Bone 03/09/23 1343   Tissue loss description Full thickness 03/09/23 1343   Red (%), Wound Tissue Color 100 % 03/09/23 1343   Periwound Area Macerated;Satellite lesion 03/09/23 1343   Wound Edges Irregular;Open 03/09/23 1343   Wound Length (cm) 2.5 cm 03/09/23 1343   Wound Width (cm) 4.3 cm 03/09/23 1343   Wound Depth (cm) 0.6 cm 03/09/23 1343   Wound Volume (cm^3) 6.45 cm^3 03/09/23 1343   Wound Surface Area (cm^2) 10.75 cm^2 03/09/23 1343   Care Cleansed with:;Soap and water;Sterile normal saline 03/09/23 1343   Dressing Applied;Absorptive Pad;Other (comment);Cast padding 03/09/23 1343       [REMOVED]      Altered Skin Integrity 01/05/23 1512 Right lower;lateral Leg (Removed)   01/05/23 1512   Altered Skin Integrity Present on Admission - Did  Patient arrive to the hospital with altered skin?: yes   Side: Right   Orientation: lower;lateral   Location: Leg   Wound Number:    Is this injury device related?:    Primary Wound Type:    Description of Altered Skin Integrity:    Ankle-Brachial Index:    Pulses:    Removal Indication and Assessment:    Wound Outcome: Healed   (Retired) Wound Length (cm):    (Retired) Wound Width (cm):    (Retired) Depth (cm):    Wound Description (Comments):    Removal Indications:    Removed 03/09/23 1345   Wound Image   03/09/23 1343         Assessment/Plan:         ICD-10-CM ICD-9-CM   1. Decubitus ulcer of right ankle, stage 4  L89.514 707.06     707.24   2. Decubitus ulcer of left heel, stage 4  L89.624 707.07     707.24   3. Nonhealing ulcer of right lower leg with fat layer exposed  L97.912 707.19   4. Nonhealing ulcer of left lower leg with fat layer exposed  L97.922 707.19   5. Edema of leg  R60.0 782.3   6. Status post transmetatarsal amputation of foot, left  Z89.432 V49.73   7. Non healing left heel wound [S91.302A (ICD-10-CM)]  S91.302A 892.1   8. Acute hematogenous osteomyelitis of left foot  M86.072 730.07   9. Urinary incontinence, unspecified type  R32 788.30           Tissue pathology and/or culture taken:  [] Yes [x] No   Sharp debridement performed:   [] Yes [x] No   Labs ordered this visit:   [] Yes [x] No   Imaging ordered this visit:   [] Yes [x] No           Orders Placed This Encounter   Procedures    Change dressing     BLE   Apply Santyl to all wounds, follow by gauze+ABD+cast padding for now.  Sent patient to ED for eval and treatment as indicated      As noted in HPI, new stage 4 anterior ankle wound and L heel wound deteriorated to a stage 4.  Transferred to ER for additional workup, possible admission, imaging and IV antibiotics.

## 2023-03-09 NOTE — FIRST PROVIDER EVALUATION
Emergency Department TeleTriage Encounter Note      CHIEF COMPLAINT    Chief Complaint   Patient presents with    Wound Infection     Pt sent from wound care appointment for worsening and new wounds. Bilateral dressings are in place. Wound care reported a new wound to right ankle with tendon exposure and a left heal wound with bone exposure.        VITAL SIGNS   Initial Vitals [03/09/23 1439]   BP Pulse Resp Temp SpO2   (!) 184/84 (!) 51 18 99 °F (37.2 °C) 100 %      MAP       --            ALLERGIES    Review of patient's allergies indicates:  No Known Allergies    PROVIDER TRIAGE NOTE  Patient with non-healing wound to right ankle. Sent from wound care for worsening wound and tendon exposure. Patient reports chills but denies fever.       ORDERS  Labs Reviewed - No data to display    ED Orders (720h ago, onward)      None              Virtual Visit Note: The provider triage portion of this emergency department evaluation and documentation was performed via [employer], a HIPAA-compliant telemedicine application, in concert with a tele-presenter in the room. A face to face patient evaluation with one of my colleagues will occur once the patient is placed in an emergency department room.      DISCLAIMER: This note was prepared with [employer] voice recognition transcription software. Garbled syntax, mangled pronouns, and other bizarre constructions may be attributed to that software system.

## 2023-03-10 PROBLEM — M86.171 ACUTE OSTEOMYELITIS OF RIGHT ANKLE: Status: ACTIVE | Noted: 2023-03-10

## 2023-03-10 LAB
ANION GAP SERPL CALC-SCNC: 11 MMOL/L (ref 8–16)
BASOPHILS # BLD AUTO: 0.05 K/UL (ref 0–0.2)
BASOPHILS NFR BLD: 0.6 % (ref 0–1.9)
BUN SERPL-MCNC: 16 MG/DL (ref 8–23)
CALCIUM SERPL-MCNC: 8.9 MG/DL (ref 8.7–10.5)
CHLORIDE SERPL-SCNC: 102 MMOL/L (ref 95–110)
CO2 SERPL-SCNC: 25 MMOL/L (ref 23–29)
CREAT SERPL-MCNC: 1.3 MG/DL (ref 0.5–1.4)
DIFFERENTIAL METHOD: ABNORMAL
EOSINOPHIL # BLD AUTO: 0.2 K/UL (ref 0–0.5)
EOSINOPHIL NFR BLD: 2.4 % (ref 0–8)
ERYTHROCYTE [DISTWIDTH] IN BLOOD BY AUTOMATED COUNT: 15.7 % (ref 11.5–14.5)
EST. GFR  (NO RACE VARIABLE): 58 ML/MIN/1.73 M^2
ESTIMATED AVG GLUCOSE: 194 MG/DL (ref 68–131)
GLUCOSE SERPL-MCNC: 181 MG/DL (ref 70–110)
HBA1C MFR BLD: 8.4 % (ref 4–5.6)
HCT VFR BLD AUTO: 33.1 % (ref 40–54)
HGB BLD-MCNC: 10.1 G/DL (ref 14–18)
IMM GRANULOCYTES # BLD AUTO: 0.03 K/UL (ref 0–0.04)
IMM GRANULOCYTES NFR BLD AUTO: 0.4 % (ref 0–0.5)
LYMPHOCYTES # BLD AUTO: 1.4 K/UL (ref 1–4.8)
LYMPHOCYTES NFR BLD: 17 % (ref 18–48)
MAGNESIUM SERPL-MCNC: 2 MG/DL (ref 1.6–2.6)
MCH RBC QN AUTO: 26.1 PG (ref 27–31)
MCHC RBC AUTO-ENTMCNC: 30.5 G/DL (ref 32–36)
MCV RBC AUTO: 86 FL (ref 82–98)
MONOCYTES # BLD AUTO: 0.7 K/UL (ref 0.3–1)
MONOCYTES NFR BLD: 7.8 % (ref 4–15)
MRSA ID BY PCR: NEGATIVE
NEUTROPHILS # BLD AUTO: 6.1 K/UL (ref 1.8–7.7)
NEUTROPHILS NFR BLD: 71.8 % (ref 38–73)
NRBC BLD-RTO: 0 /100 WBC
PHOSPHATE SERPL-MCNC: 3.2 MG/DL (ref 2.7–4.5)
PLATELET # BLD AUTO: 416 K/UL (ref 150–450)
PMV BLD AUTO: 9.2 FL (ref 9.2–12.9)
POCT GLUCOSE: 102 MG/DL (ref 70–110)
POCT GLUCOSE: 171 MG/DL (ref 70–110)
POCT GLUCOSE: 178 MG/DL (ref 70–110)
POCT GLUCOSE: 203 MG/DL (ref 70–110)
POTASSIUM SERPL-SCNC: 4.8 MMOL/L (ref 3.5–5.1)
RBC # BLD AUTO: 3.87 M/UL (ref 4.6–6.2)
SODIUM SERPL-SCNC: 138 MMOL/L (ref 136–145)
STAPH AUREUS ID BY PCR: NEGATIVE
WBC # BLD AUTO: 8.48 K/UL (ref 3.9–12.7)

## 2023-03-10 PROCEDURE — 87070 CULTURE OTHR SPECIMN AEROBIC: CPT | Performed by: STUDENT IN AN ORGANIZED HEALTH CARE EDUCATION/TRAINING PROGRAM

## 2023-03-10 PROCEDURE — 80048 BASIC METABOLIC PNL TOTAL CA: CPT | Performed by: STUDENT IN AN ORGANIZED HEALTH CARE EDUCATION/TRAINING PROGRAM

## 2023-03-10 PROCEDURE — 63600175 PHARM REV CODE 636 W HCPCS: Performed by: STUDENT IN AN ORGANIZED HEALTH CARE EDUCATION/TRAINING PROGRAM

## 2023-03-10 PROCEDURE — 84100 ASSAY OF PHOSPHORUS: CPT | Performed by: STUDENT IN AN ORGANIZED HEALTH CARE EDUCATION/TRAINING PROGRAM

## 2023-03-10 PROCEDURE — G0378 HOSPITAL OBSERVATION PER HR: HCPCS

## 2023-03-10 PROCEDURE — 25000003 PHARM REV CODE 250: Performed by: STUDENT IN AN ORGANIZED HEALTH CARE EDUCATION/TRAINING PROGRAM

## 2023-03-10 PROCEDURE — 85025 COMPLETE CBC W/AUTO DIFF WBC: CPT | Performed by: STUDENT IN AN ORGANIZED HEALTH CARE EDUCATION/TRAINING PROGRAM

## 2023-03-10 PROCEDURE — 87075 CULTR BACTERIA EXCEPT BLOOD: CPT | Performed by: STUDENT IN AN ORGANIZED HEALTH CARE EDUCATION/TRAINING PROGRAM

## 2023-03-10 PROCEDURE — 11044 DBRDMT BONE 1ST 20 SQ CM/<: CPT | Mod: ,,, | Performed by: STUDENT IN AN ORGANIZED HEALTH CARE EDUCATION/TRAINING PROGRAM

## 2023-03-10 PROCEDURE — 25000003 PHARM REV CODE 250: Performed by: INTERNAL MEDICINE

## 2023-03-10 PROCEDURE — 25000003 PHARM REV CODE 250

## 2023-03-10 PROCEDURE — 87077 CULTURE AEROBIC IDENTIFY: CPT | Performed by: STUDENT IN AN ORGANIZED HEALTH CARE EDUCATION/TRAINING PROGRAM

## 2023-03-10 PROCEDURE — 99223 1ST HOSP IP/OBS HIGH 75: CPT | Mod: 25,,, | Performed by: STUDENT IN AN ORGANIZED HEALTH CARE EDUCATION/TRAINING PROGRAM

## 2023-03-10 PROCEDURE — 87040 BLOOD CULTURE FOR BACTERIA: CPT | Performed by: PHYSICIAN ASSISTANT

## 2023-03-10 PROCEDURE — 63600175 PHARM REV CODE 636 W HCPCS: Performed by: INTERNAL MEDICINE

## 2023-03-10 PROCEDURE — 96372 THER/PROPH/DIAG INJ SC/IM: CPT | Performed by: STUDENT IN AN ORGANIZED HEALTH CARE EDUCATION/TRAINING PROGRAM

## 2023-03-10 PROCEDURE — 87186 SC STD MICRODIL/AGAR DIL: CPT | Performed by: STUDENT IN AN ORGANIZED HEALTH CARE EDUCATION/TRAINING PROGRAM

## 2023-03-10 PROCEDURE — 83036 HEMOGLOBIN GLYCOSYLATED A1C: CPT | Performed by: STUDENT IN AN ORGANIZED HEALTH CARE EDUCATION/TRAINING PROGRAM

## 2023-03-10 PROCEDURE — 11044 DEBRIDEMENT: ICD-10-PCS | Mod: ,,, | Performed by: STUDENT IN AN ORGANIZED HEALTH CARE EDUCATION/TRAINING PROGRAM

## 2023-03-10 PROCEDURE — 83735 ASSAY OF MAGNESIUM: CPT | Performed by: STUDENT IN AN ORGANIZED HEALTH CARE EDUCATION/TRAINING PROGRAM

## 2023-03-10 PROCEDURE — 11047 DBRDMT BONE EACH ADDL: CPT | Mod: ,,, | Performed by: STUDENT IN AN ORGANIZED HEALTH CARE EDUCATION/TRAINING PROGRAM

## 2023-03-10 PROCEDURE — 99223 PR INITIAL HOSPITAL CARE,LEVL III: ICD-10-PCS | Mod: 25,,, | Performed by: STUDENT IN AN ORGANIZED HEALTH CARE EDUCATION/TRAINING PROGRAM

## 2023-03-10 PROCEDURE — 11047 DEBRIDEMENT: ICD-10-PCS | Mod: ,,, | Performed by: STUDENT IN AN ORGANIZED HEALTH CARE EDUCATION/TRAINING PROGRAM

## 2023-03-10 RX ORDER — OXYCODONE HYDROCHLORIDE 5 MG/1
5 TABLET ORAL 4 TIMES DAILY PRN
Status: DISPENSED | OUTPATIENT
Start: 2023-03-10 | End: 2023-03-11

## 2023-03-10 RX ORDER — NALOXONE HCL 0.4 MG/ML
0.4 VIAL (ML) INJECTION
Status: DISCONTINUED | OUTPATIENT
Start: 2023-03-10 | End: 2023-03-10

## 2023-03-10 RX ORDER — SODIUM CHLORIDE 9 MG/ML
INJECTION, SOLUTION INTRAVENOUS
Status: DISCONTINUED | OUTPATIENT
Start: 2023-03-10 | End: 2023-03-22 | Stop reason: HOSPADM

## 2023-03-10 RX ORDER — METRONIDAZOLE 500 MG/1
500 TABLET ORAL EVERY 8 HOURS
Status: DISCONTINUED | OUTPATIENT
Start: 2023-03-10 | End: 2023-03-22 | Stop reason: HOSPADM

## 2023-03-10 RX ADMIN — ATORVASTATIN CALCIUM 80 MG: 40 TABLET, FILM COATED ORAL at 08:03

## 2023-03-10 RX ADMIN — OXYCODONE HYDROCHLORIDE AND ACETAMINOPHEN 500 MG: 500 TABLET ORAL at 08:03

## 2023-03-10 RX ADMIN — ISOSORBIDE DINITRATE 10 MG: 10 TABLET ORAL at 05:03

## 2023-03-10 RX ADMIN — CEFEPIME 2 G: 2 INJECTION, POWDER, FOR SOLUTION INTRAVENOUS at 05:03

## 2023-03-10 RX ADMIN — HYDRALAZINE HYDROCHLORIDE 25 MG: 25 TABLET, FILM COATED ORAL at 09:03

## 2023-03-10 RX ADMIN — INSULIN ASPART 20 UNITS: 100 INJECTION, SOLUTION INTRAVENOUS; SUBCUTANEOUS at 08:03

## 2023-03-10 RX ADMIN — METRONIDAZOLE 500 MG: 500 TABLET ORAL at 09:03

## 2023-03-10 RX ADMIN — GABAPENTIN 200 MG: 100 CAPSULE ORAL at 08:03

## 2023-03-10 RX ADMIN — PANTOPRAZOLE SODIUM 40 MG: 40 TABLET, DELAYED RELEASE ORAL at 08:03

## 2023-03-10 RX ADMIN — CLOPIDOGREL BISULFATE 75 MG: 75 TABLET ORAL at 08:03

## 2023-03-10 RX ADMIN — APIXABAN 5 MG: 5 TABLET, FILM COATED ORAL at 08:03

## 2023-03-10 RX ADMIN — HYDRALAZINE HYDROCHLORIDE 25 MG: 25 TABLET, FILM COATED ORAL at 08:03

## 2023-03-10 RX ADMIN — TAMSULOSIN HYDROCHLORIDE 0.4 MG: 0.4 CAPSULE ORAL at 08:03

## 2023-03-10 RX ADMIN — ASPIRIN 81 MG: 81 TABLET, COATED ORAL at 08:03

## 2023-03-10 RX ADMIN — HYDRALAZINE HYDROCHLORIDE 25 MG: 25 TABLET, FILM COATED ORAL at 02:03

## 2023-03-10 RX ADMIN — NIFEDIPINE 60 MG: 30 TABLET, FILM COATED, EXTENDED RELEASE ORAL at 08:03

## 2023-03-10 RX ADMIN — LOSARTAN POTASSIUM 100 MG: 50 TABLET, FILM COATED ORAL at 08:03

## 2023-03-10 RX ADMIN — INSULIN ASPART 20 UNITS: 100 INJECTION, SOLUTION INTRAVENOUS; SUBCUTANEOUS at 05:03

## 2023-03-10 RX ADMIN — INSULIN DETEMIR 35 UNITS: 100 INJECTION, SOLUTION SUBCUTANEOUS at 09:03

## 2023-03-10 RX ADMIN — VANCOMYCIN HYDROCHLORIDE 2500 MG: 500 INJECTION, POWDER, LYOPHILIZED, FOR SOLUTION INTRAVENOUS at 10:03

## 2023-03-10 RX ADMIN — ISOSORBIDE DINITRATE 10 MG: 10 TABLET ORAL at 09:03

## 2023-03-10 RX ADMIN — ISOSORBIDE DINITRATE 10 MG: 10 TABLET ORAL at 08:03

## 2023-03-10 RX ADMIN — APIXABAN 5 MG: 5 TABLET, FILM COATED ORAL at 09:03

## 2023-03-10 RX ADMIN — SODIUM CHLORIDE: 9 INJECTION, SOLUTION INTRAVENOUS at 05:03

## 2023-03-10 RX ADMIN — GABAPENTIN 200 MG: 100 CAPSULE ORAL at 09:03

## 2023-03-10 NOTE — ED NOTES
Attempted to get second set of cultures without success. Lab called since pt was made lab collect on dayshift and asked to come try to get second culture before starting abx.

## 2023-03-10 NOTE — PLAN OF CARE
SW met with pt via [employer] to complete assessment. Pt is AxO x3 and able to verbally answer assessment questions. Pt able to confirm demographic information. Pt reports to live at home with his sibling Kandy. Pt reports being able to bathe and clothe himself and Kandy helps wit cooking. Pt reports while at home using wheelchair and walker for baseline ambulation. Pt is active with Home health and receive wound care support and education. At time of discharge pt to be transported home by Brother. SW updated whiteboard with [facility] name and contact information. SW confirmed pt understanding of Observation unit and expected discharge plan. SW will continue to follow pt throughout care and assist with any discharge needs.         03/10/23 1314   Discharge Planning   Assessment Type Discharge Planning Brief Assessment   Resource/Environmental Concerns none   Support Systems Family members   Equipment Currently Used at Home walker, rolling;wheelchair   Current Living Arrangements home   Patient/Family Anticipates Transition to home with family   Patient/Family Anticipated Services at Transition home health care   DME Needed Upon Discharge  none   Discharge Plan A Home with family;Home Health       Future Appointments   Date Time Provider Department Center   3/22/2023 10:00 AM Cesar Wu MD [facility] Case Management  [phone]

## 2023-03-10 NOTE — NURSING
Arrived on the unit, AAOX3 bilamy LE with wound dressing on CDI, per ED RN wound care was done by Md . Plan of care explained, safety measures in place, call bell in reach, instructed to call for assistance, verbalized understanding. Vital signs per flow sheet.

## 2023-03-10 NOTE — ED NOTES
Spoke to MD, okay to start abx without second culture d/t pt being difficult stick, multiple people on unit attempting and lab attempts x2 without success

## 2023-03-10 NOTE — PROGRESS NOTES
"[facility] Medicine Progress Note    Primary Team: [facility] Hospitalist Team B  Attending Physician: Vinod Elise MD  Resident: Bethany Ruiz MD  Intern: Vi Bains MD (Nak)    Hospital Day: 0 days    Chief Complaint: Chronic BLE wounds w/ superimposed cellulitis worsening over 3wks    Subjective:   NAEON. Afebrile w/ stable vitals on room air. Patient seen and examined by me this morning. Pt denies any worsening pain in lower extremities. Pt denies fevers, chills, N/V, SOB, chest pain/pressure, abd pain, or numbness/weakness at this time     Review of Systems   All other systems reviewed and are negative.      Objective:   Last 24 Hour Vital Signs:  BP  Min: 151/65  Max: 184/84  Temp  Av.5 °F (36.9 °C)  Min: 98.1 °F (36.7 °C)  Max: 99 °F (37.2 °C)  Pulse  Av  Min: 45  Max: 68  Resp  Av.8  Min: 16  Max: 20  SpO2  Av.3 %  Min: 93 %  Max: 100 %  Height  Av' 9" (175.3 cm)  Min: 5' 9" (175.3 cm)  Max: 5' 9" (175.3 cm)  Weight  Av.3 kg (274 lb 0.3 oz)  Min: 124.3 kg (274 lb)  Max: 124.3 kg (274 lb 0.5 oz)  No intake or output data in the 24 hours ending 03/10/23 0742     Physical Examination:  Physical Exam     Physical Exam   Constitutional: He is oriented to person, place, and time. normal appearance. He appears obese.  Non-toxic appearance. No distress. He does not appear ill.   HENT:   Mouth/Throat: Mucous membranes are moist. Oropharynx is clear.   Eyes: Pupils are equal, round, and reactive to light.   Cardiovascular: Normal rate, regular rhythm, normal heart sounds and normal pulses.  Pulmonary:     Effort: Pulmonary effort is normal.      Breath sounds: Normal breath sounds.   Abdominal: Soft. Normal appearance and bowel sounds are normal.   Musculoskeletal:         General: Swelling and deformity present.      Comments: See media tab on 3/9/23   Neurological: He is alert and oriented to person, place, and time.   Skin: Skin is warm and dry.   See media tab on 3/9/23 "   Psychiatric: His behavior is normal.      Laboratory:  Recent Labs   Lab 03/09/23  1531 03/09/23  1618 03/10/23  0608   WBC 10.86  --  8.48   HGB 9.4*  --  10.1*   HCT 29.7*  --  33.1*     --  416   MCV 84  --  86   RDW 15.9*  --  15.7*   NA  --  138 138   K  --  3.8 4.8   CL  --  101 102   CO2  --  25 25   BUN  --  18 16   CREATININE  --  1.4 1.3   GLU  --  107 181*   PROT  --  8.4  --    ALBUMIN  --  2.4*  --    BILITOT  --  0.4  --    AST  --  32  --    ALKPHOS  --  71  --    ALT  --  28  --      Microbiology Results (last 7 days)       Procedure Component Value Units Date/Time    Blood culture #2 **CANNOT BE ORDERED STAT** [[id number]] Collected: 03/10/23 0608    Order Status: Sent Specimen: Blood Updated: 03/10/23 0608    Blood culture #1 **CANNOT BE ORDERED STAT** [[id number]] Collected: 03/09/23 1525    Order Status: Completed Specimen: Blood from Peripheral, Hand, Right Updated: 03/10/23 0515     Blood Culture, Routine No Growth to date          I have personally reviewed the above laboratory studies.     Imaging:  EKG (my interpretation): no indication for EKG this admit at this time    X-Ray Ankle Complete Right    Result Date: 3/9/2023  EXAMINATION: XR ANKLE COMPLETE 3 VIEW RIGHT CLINICAL HISTORY: Unspecified open wound, unspecified lower leg, initial encounter TECHNIQUE: AP, lateral, and oblique images of the right ankle were performed. COMPARISON: Right foot series 02/23/2022 FINDINGS: Chronic appearing deformity of the mid to distal femoral shaft presumably sequela of remote trauma.  No acute displaced fracture, dislocation or destructive osseous process.  There is DJD. Nonspecific soft tissue swelling about the distal right lower leg, ankle and dorsal hindfoot particular along the anterior and lateral aspect likely representing cellulitis.  No large soft tissue defect or subcutaneous emphysema seen.  Scattered atherosclerotic vascular and also nonspecific soft tissue calcifications noted.  No  radiodense retained foreign body.  Small enthesophyte at the Achilles insertion site.     Distal right lower leg, ankle and hindfoot diffuse nonspecific soft tissue swelling from edema or cellulitis with superimposed more prominent soft tissue swelling along the anterolateral aspect of the distal lower leg.  Clinical correlation advised. Additional chronic appearing findings as above. Electronically signed by: Juan Hernandez MD Date:    03/09/2023 Time:    16:51    MRI Ankle Without Contrast Right    Result Date: 3/9/2023  EXAMINATION: MRI ANKLE WITHOUT CONTRAST RIGHT CLINICAL HISTORY: Soft tissue infection suspected, ankle, xray done; TECHNIQUE: MRI ankle performed per routine protocol without contrast. COMPARISON: MRI right lower extremity from 09/01/2006.  MRI the contralateral ankle from 02/19/2021.  Radiographs of the right ankle from 03/09/2023 taken at 1543. FINDINGS: Bone: There is no evidence of acute osteomyelitis.  Marrow signal is normal.  No marrow edema or T1 hypointense signal. Articulations: There is cartilage loss of the joint between the navicular and the cuneiform bones with adjacent endplate edema and cystic change in the distal pole of the navicular.  Remaining articulations are grossly unremarkable.  There is no evidence of a large joint effusion. Tendons: Flexor, extensor, and peroneal tendons are grossly intact and demonstrate normal signal.  There is no evidence of tenosynovitis.  The Achilles tendon is intact. Ligaments: The anterior inferior and posteroinferior tibiofibular ligaments are intact.  The anterior talofibular ligament is intact.  The calcaneal fibular and posterior talofibular ligaments are intact.  The deltoid and spring ligaments are intact.  The Lisfranc ligament is not entirely included in the field of view. Soft tissues: There is a large dorsal soft tissue ulcer with exposure of the extensor hallucis tendon.  There is circumferential soft tissue edema the ankle extending  into the dorsum the hindfoot.     1. No acute osteomyelitis. 2. Large soft tissue ulcer of the dorsal aspect of the hindfoot and ankle with exposure of the extensor hallucis tendon.  Surrounding soft tissue edema and cellulitis as above. Electronically signed by: Zane Galeana Date:    03/09/2023 Time:    19:45       Current Medications:     Scheduled:   apixaban  5 mg Oral BID    ascorbic acid (vitamin C)  500 mg Oral Daily    aspirin  81 mg Oral Daily    atorvastatin  80 mg Oral Daily    clopidogreL  75 mg Oral Daily    furosemide  40 mg Oral Q48H    gabapentin  200 mg Oral BID    hydrALAZINE  25 mg Oral TID    hydroCHLOROthiazide  25 mg Oral Daily    insulin aspart U-100  20 Units Subcutaneous TID WM    insulin detemir U-100  35 Units Subcutaneous QHS    isosorbide dinitrate  10 mg Oral TID    losartan  100 mg Oral Daily    NIFEdipine  60 mg Oral Daily    pantoprazole  40 mg Oral Daily    tamsulosin  0.4 mg Oral Daily         Infusions:       PRN:  dextrose 10%, dextrose 10%, dextrose, dextrose, glucagon (human recombinant), insulin aspart U-100, naloxone, sodium chloride 0.9%    Antibiotics and Day Number of Therapy:  Antibiotics (From admission, onward)      None          Assessment:     Saji Castañeda is a 73 y.o. male with a PMHx of chronic LLE osteomyelitis,T2DM c/b neuropathy, PVD, DVT in [facility] (June 2022), HTN, CKD, Mobitz Type 1, NSTEMI (9/2022) presenting with worsening BLE wounds that has been getting worse over the past 3 wks. Pt will be admitted to [facility] for the evaluation and management of chronic BLE wounds w/ superimposed cellulitis    Plan:     Chronic BLE wounds w/ superimposed cellulitis  Per Wound Care, pt has new R anterior ankle wounds w/ tendon exposure & a known L plantar foot wound w/ palpable bone in L heel from previous visit. Pt sent to ED for possible IV abx +/- debridement.  Afebrile w/ no elevations in WBC and ANC, however cannot r/o systemic infxn given pt's poor immune status 2/2  "long T2DM hx  1st B-Cx NGTD; unable to obtain 2nd B-Cx due to difficulty obtaining and ultimately pt refusal  Vanc IV  Consulted ID for abx management/regiment given hx of receiving dapto. per ID:  Pending; appreciate recs  MRI R Ankle showed no acute osteomyelitis but showed large soft tissue ulcer of the dorsal aspect of the hindfoot and ankle with exposure of the extensor hallucis tendon w/ surrounding soft tissue edema and cellulitis.   BLE U/S showed no evidence of vasc occlusion/stenosis in RLE/LLE arterial system, but does display findings suggestive of vasc ds  Pending MRI L Foot  Consulted Podiatry for L heel osteo eval; per Podiatry:  Pending; appreciate recs (pt will be seen 3/10/23 afternoon per Dr Orozco)    T2DM w/ Neuropathy  Last A1c 9.9 about 5 months ago  Home Metformin and Rosuvastatin w/ insulin  POCT glucose checks  A1c 8.4  Currently on Atorvastatin and SSI  Continue home Gabapentin for neuropathy  Continue home Lasix and Tamsulosin for hx inability to empty bladder 2/2 neuropathy     PAD w/ hx of DVT in RUE  DVT in RUE in June 2022  DAPT  Continue Eliquis     HTN  Continue home hydralazine, isodil, losartan, and nifedipine.     Hx of Type 1 Mobitz  Avoid BB  Unremarkable ROS and PE suggestive of cardiac process at this time  CTM     Hx of NSTEMI  Hx of NSTEMI in 9/2022  TTE during NSTEMI work-up showed LVEF ~60% w/ notable Pulm HTN (PA systolic pressure 41mmHg)  Unremarkable ROS and PE suggestive of cardiac process at this time  CTM     Ppx: Apixaban  Diet: Diabetic  Code: Full     Disposition: pending resolution/improvement of worsening BLE wound infxn.    Vi "Kassandra Bains MD  [facility] Internal Medicine, PGY-1    [facility] Medicine Hospitalist Pager numbers:   [facility] Hospitalist Medicine Team A (Lorne/Riki): [phone]  [facility] Hospitalist Medicine Team B (Arik/Lucio):  [phone]      "

## 2023-03-10 NOTE — H&P
"[facility] Medicine H&P Note     Admitting Team: [facility] Hospitalist Team B  Attending Physician: Vinod Elise MD  Resident: Bethany Ruiz MD  Intern: Vi Bains MD (Nak)    Date of Admit: 3/9/2023    Chief Complaint     Wound Infection (Pt sent from wound care appointment for worsening and new wounds. Bilateral dressings are in place. Wound care reported a new wound to right ankle with tendon exposure and a left heal wound with bone exposure. )   for ~3wks    Subjective:      History of Present Illness:  Saji Castañeda is a 73 y.o. male with a PMHx of chronic LLE osteomyelitis,T2DM c/b neuropathy, PVD, DVT in RUE (June 2022), HTN, CKD, Mobitz Type 1, NSTEMI (9/2022) presented on 3/9/2023 from [facility] for evaluation of worsening BLE wounds. Per Wound Care, pt has new R anterior ankle wounds w/ tendon exposure & a known L plantar foot wound w/ palpable bone in L heel from previous visit; pt sent to ED for possible IV abx +/- debridement. In the ED, notable PE findings (see media tab for 3/9/23) however pt was afebrile w/ unremarkable CBC. B-Cx was drawn and pt received Vanc. Pt seen at bedside. Limited hx provided as interview conducted around evening time while pt already asleep. Pt stated pain has been worsening in his lower extremities over the last three weeks, however unable/unwilling to provide any further clarity at this time. Pt denied any fevers, chills, night sweats, SOB, chest pain/pressure, abdominal pains, numbness or weakness. Pt currently lives w/ his brother and sister and is aware of his current presentation. Due to notable increase in irritability and less engagement to interview, interview was ceased at that time. Advised pt to notify staff if pt notices worsening signs or symptoms of fever, chills, or malaise. Pt will be admitted to [facility] IM for the evaluation and management of worsening BLE wound.    Past Medical History:  Past Medical History:   Diagnosis Date    Arthritis "     legs    Diabetes mellitus     Diabetes mellitus, type 2     Hyperlipidemia     Hypertension     Osteomyelitis        Past Surgical History:  Past Surgical History:   Procedure Laterality Date    ANGIOGRAPHY OF LOWER EXTREMITY N/A 2/3/2021    Procedure: Angiogram Extremity Bilateral;  Surgeon: Ernst Chacko MD;  Location: [facility] OR [address] [place] [address];  Service: Peripheral Vascular;  Laterality: N/A;  7.4 mintues fluoroscopy time  816.15 mGy  170.17 Gycm2    AORTOGRAPHY WITH EXTREMITY RUNOFF Bilateral 2/3/2021    Procedure: AORTOGRAM, WITH EXTREMITY RUNOFF;  Surgeon: Ernst Chacko MD;  Location: [facility] OR [address] [place] [address];  Service: Peripheral Vascular;  Laterality: Bilateral;    DEBRIDEMENT OF FOOT Left 2/23/2021    Procedure: DEBRIDEMENT, LEFT HEEL;  Surgeon: Mayra Schroeder DPM;  Location: [facility] OR [address] [place] [address];  Service: Podiatry;  Laterality: Left;    FOOT AMPUTATION  October 2010    left high midfoot amputation       Allergies:  Review of patient's allergies indicates:  No Known Allergies    Home Medications:  Prior to Admission medications    Medication Sig Start Date End Date Taking? Authorizing Provider   aspirin (ECOTRIN) 81 MG EC tablet Take 81 mg by mouth once daily.   Yes Historical Provider   B COMPLEX-VITAMIN B12 tablet Take 1 tablet by mouth once daily. 1/3/23  Yes Historical Provider   clopidogreL (PLAVIX) 75 mg tablet Take 1 tablet (75 mg total) by mouth once daily. 9/20/22 9/20/23 Yes Merlin Kearney MD   furosemide (LASIX) 40 MG tablet Take 40 mg by mouth every 48 hours. 1/17/23  Yes Historical Provider   LANTUS SOLOSTAR U-100 INSULIN glargine 100 units/mL SubQ pen Inject 45 Units into the skin every evening. 7/8/22  Yes Salma Strauss MD   losartan-hydrochlorothiazide 100-25 mg (HYZAAR) 100-25 mg per tablet Take 1 tablet by mouth once daily. 1/23/23  Yes Historical Provider   metFORMIN (GLUCOPHAGE-XR) 500 MG ER 24hr tablet Take 1,000 mg by mouth 2 (two) times a day. 1/17/23  Yes Historical Provider  "  NIFEdipine (ADALAT CC) 60 MG TbSR Take 60 mg by mouth once daily. 2/6/23  Yes Historical Provider   pantoprazole (PROTONIX) 40 MG tablet Take 40 mg by mouth once daily. 2/6/23  Yes Historical Provider   rosuvastatin (CRESTOR) 40 MG Tab Take 40 mg by mouth once daily. 4/9/21  Yes Historical Provider   tamsulosin (FLOMAX) 0.4 mg Cap Take 0.4 mg by mouth once daily.   Yes Historical Provider   acetaminophen (TYLENOL) 500 MG tablet Take 1,000 mg by mouth every 8 (eight) hours as needed for Pain.    Historical Provider   apixaban (ELIQUIS) 5 mg Tab Take 1 tablet (5 mg total) by mouth 2 (two) times daily. 9/19/22   Merlin Kearney MD   ascorbic acid, vitamin C, (VITAMIN C) 500 MG tablet Take 500 mg by mouth once daily.    Historical Provider   BD ULTRA-FINE ADITYA PEN NEEDLE 32 gauge x 5/32" Ndle USE FOUR TIMES DAILY WITH MEALS AND NIGHTLY 11/20/19   Miriam Duong NP   blood sugar diagnostic (CONTOUR TEST STRIPS) Strp Inject 1 strip into the skin 2 (two) times daily before meals. 5/27/16   ANIYAH Borges, FNP   gabapentin (NEURONTIN) 100 MG capsule Take 2 capsules (200 mg total) by mouth 2 (two) times daily.  Patient not taking: Reported on 3/9/2023 12/14/20 6/20/22  Kun Shook MD   glipiZIDE (GLUCOTROL) 10 MG tablet Take 20 mg by mouth 2 (two) times a day. 2/15/23   Historical Provider   hydrALAZINE (APRESOLINE) 25 MG tablet Take 1 tablet (25 mg total) by mouth 3 (three) times daily. 7/8/22 7/8/23  Salma Strauss MD   insulin aspart U-100 (NOVOLOG) 100 unit/mL (3 mL) InPn pen Inject 0-5 Units into the skin before meals and at bedtime as needed (Hyperglycemia). **LOW CORRECTION DOSE**  Blood Glucose  mg/dL                  Pre-meal                2200  151-200                0 unit                      0 unit  201-250                2 units                    1 unit  251-300                3 units                    1 unit  301-350                4 units                    2 units  >350                  "    5 units                    3 units  Administer subcutaneously if needed at times designated by monitoring schedule. 6/24/22 6/24/23  Salma Strauss MD   insulin aspart U-100 (NOVOLOG) 100 unit/mL (3 mL) InPn pen Inject 20 Units into the skin 3 (three) times daily with meals. 6/27/22 6/27/23  Salma Strauss MD   isosorbide dinitrate (ISORDIL) 10 MG tablet Take 1 tablet (10 mg total) by mouth 3 (three) times daily. 7/8/22 7/8/23  Salma Strauss MD   Lactobacillus rhamnosus GG (CULTURELLE) 10 billion cell capsule Take 1 capsule by mouth 2 (two) times daily. 6/24/22   Salma Strauss MD   MICROLET LANCET Misc  4/18/14   Historical Provider   aspirin (ECOTRIN) 81 MG EC tablet Take 1 tablet (81 mg total) by mouth once daily. for 6 days 9/19/22 3/9/23  Merlin Kearney MD   azithromycin (Z-HARRISON) 250 MG tablet  11/21/22 3/9/23  Historical Provider   azithromycin (ZITHROMAX) 500 MG tablet Take 500 mg by mouth. 9/15/22 3/9/23  Historical Provider   benzonatate (TESSALON) 200 MG capsule Take 200 mg by mouth. 11/21/22 3/9/23  Historical Provider   hydroCHLOROthiazide (HYDRODIURIL) 25 MG tablet Take 0.5 tablets (12.5 mg total) by mouth every Mon, Wed, Fri. Beginning on 7/4/2022 7/4/22 7/8/22  Keerthi Mayorga MD   metFORMIN (FORTAMET) 1,000 mg 24hr tablet Take by mouth 2 (two) times daily. 1/3/23 3/9/23  Historical Provider   NIFEdipine (PROCARDIA-XL) 90 MG (OSM) 24 hr tablet Take 1 tablet (90 mg total) by mouth once daily. 6/25/22 3/9/23  Salma Strauss MD   tamsulosin (FLOMAX) 0.4 mg Cap Take 1 capsule (0.4 mg total) by mouth once daily. 6/20/22 3/9/23  Salma Strauss MD       Family History:  Family History   Problem Relation Age of Onset    Diabetes Mother     Heart disease Mother     Stroke Sister     Cancer Brother     Diabetes Sister        Social History:  Social History     Tobacco Use    Smoking status: Never    Smokeless tobacco: Never   Substance Use Topics    Alcohol use: No     Comment: occassional  "   Drug use: No       Review of Systems:  Review of Systems   Constitutional:  Negative for chills and fever.   Respiratory:  Negative for shortness of breath.    Cardiovascular:  Negative for chest pain.   Gastrointestinal:  Negative for nausea and vomiting.   Genitourinary:  Negative for dysuria.   Musculoskeletal:         Bilateral LE pain   All other systems reviewed and are negative.    Health Care Maintenance :   Primary Care Physician: [facility]   Immunizations:   Immunization History   Administered Date(s) Administered    Influenza - Trivalent (ADULT) 10/08/2020    PPD Test 2020, 2021, 2022    Pneumococcal Conjugate - 13 Valent 10/16/2018      Cancer Screening:  Colonoscopy: not current    Objective:   Last 24 Hour Vital Signs:  BP  Min: 167/72  Max: 184/84  Temp  Av.6 °F (37 °C)  Min: 98.1 °F (36.7 °C)  Max: 99 °F (37.2 °C)  Pulse  Av.8  Min: 47  Max: 63  Resp  Av  Min: 18  Max: 18  SpO2  Av.7 %  Min: 93 %  Max: 100 %  Height  Av' 9" (175.3 cm)  Min: 5' 9" (175.3 cm)  Max: 5' 9" (175.3 cm)  Weight  Av.3 kg (274 lb 0.3 oz)  Min: 124.3 kg (274 lb)  Max: 124.3 kg (274 lb 0.5 oz)  Body mass index is 40.47 kg/m².  No intake/output data recorded.    Physical Examination:  Physical Exam   Constitutional: He is oriented to person, place, and time. normal appearance. He appears obese.  Non-toxic appearance. No distress. He does not appear ill.   HENT:   Mouth/Throat: Mucous membranes are moist. Oropharynx is clear.   Eyes: Pupils are equal, round, and reactive to light.   Cardiovascular: Normal rate, regular rhythm, normal heart sounds and normal pulses. Pulmonary:      Effort: Pulmonary effort is normal.      Breath sounds: Normal breath sounds.     Abdominal: Soft. Normal appearance and bowel sounds are normal.   Musculoskeletal:         General: Swelling and deformity present.      Comments: See media tab on 3/9/23   Neurological: He is alert and " oriented to person, place, and time.   Skin: Skin is warm and dry.   See media tab on 3/9/23   Psychiatric: His behavior is normal.      Laboratory:  Most Recent Data:  CBC:   Lab Results   Component Value Date    WBC 10.86 03/09/2023    HGB 9.4 (L) 03/09/2023    HCT 29.7 (L) 03/09/2023     03/09/2023    MCV 84 03/09/2023    RDW 15.9 (H) 03/09/2023     WBC Differential: 7.8 % N, 0 % Bands, 2.2 % L, 0.7 % M, 0.2 % Eo, 0.03 % Baso, no additional cells seen  BMP:   Lab Results   Component Value Date     03/09/2023    K 3.8 03/09/2023     03/09/2023    CO2 25 03/09/2023    BUN 18 03/09/2023    CREATININE 1.4 03/09/2023     03/09/2023    CALCIUM 9.2 03/09/2023    MG 1.8 09/20/2022    PHOS 3.5 09/17/2022     LFTs:   Lab Results   Component Value Date    PROT 8.4 03/09/2023    ALBUMIN 2.4 (L) 03/09/2023    BILITOT 0.4 03/09/2023    AST 32 03/09/2023    ALKPHOS 71 03/09/2023    ALT 28 03/09/2023     Coags:   Lab Results   Component Value Date    INR 1.1 09/18/2022     FLP:   Lab Results   Component Value Date    CHOL 91 (L) 09/19/2022    HDL 40 09/19/2022    LDLCALC 37.4 (L) 09/19/2022    TRIG 68 09/19/2022    CHOLHDL 44.0 09/19/2022     DM:   Lab Results   Component Value Date    HGBA1C 9.9 (H) 09/17/2022    HGBA1C 9.5 (H) 06/16/2022    HGBA1C 7.9 (H) 12/08/2020    LDLCALC 37.4 (L) 09/19/2022    CREATININE 1.4 03/09/2023     Thyroid:   Lab Results   Component Value Date    TSH 2.475 09/19/2022     Anemia:   Lab Results   Component Value Date    IRON 20 (L) 02/19/2021    TIBC 160 (L) 02/19/2021    FERRITIN 190 02/19/2021    DFFKSPLF35 403 02/19/2021    FOLATE 4.2 02/19/2021     Cardiac:   Lab Results   Component Value Date    TROPONINI 0.007 09/19/2022    BNP 63 09/17/2022     Urinalysis:   Lab Results   Component Value Date    LABURIN  09/17/2022     Multiple organisms isolated. None in predominance.  Repeat if    LABURIN clinically necessary. 09/17/2022    COLORU Yellow 09/17/2022    CLARITYU  Slightly Cloudy 04/22/2022    SPECGRAV >1.030 (A) 09/17/2022    NITRITE Negative 09/17/2022    KETONESU Negative 09/17/2022    UROBILINOGEN norm 04/22/2022    WBCUA 54 (H) 09/17/2022     Trended Lab Data:  Recent Labs   Lab 03/09/23  1531 03/09/23  1618   WBC 10.86  --    HGB 9.4*  --    HCT 29.7*  --      --    MCV 84  --    RDW 15.9*  --    NA  --  138   K  --  3.8   CL  --  101   CO2  --  25   BUN  --  18   CREATININE  --  1.4   GLU  --  107   PROT  --  8.4   ALBUMIN  --  2.4*   BILITOT  --  0.4   AST  --  32   ALKPHOS  --  71   ALT  --  28     Trended Cardiac Data:  No results for input(s): TROPONINI, CKTOTAL, CKMB, BNP in the last 168 hours.  Microbiology Data:  Microbiology Results (last 7 days)       Procedure Component Value Units Date/Time    Blood culture #1 **CANNOT BE ORDERED STAT** [[id number]] Collected: 03/09/23 1525    Order Status: Sent Specimen: Blood from Peripheral, Hand, Right Updated: 03/09/23 1532    Blood culture #2 **CANNOT BE ORDERED STAT** [[id number]]     Order Status: Sent Specimen: Blood             Other Results:  EKG (my interpretation): no indication for EKG this admit at this time    Radiology:  X-Ray Ankle Complete Right    Result Date: 3/9/2023  EXAMINATION: XR ANKLE COMPLETE 3 VIEW RIGHT CLINICAL HISTORY: Unspecified open wound, unspecified lower leg, initial encounter TECHNIQUE: AP, lateral, and oblique images of the right ankle were performed. COMPARISON: Right foot series 02/23/2022 FINDINGS: Chronic appearing deformity of the mid to distal femoral shaft presumably sequela of remote trauma.  No acute displaced fracture, dislocation or destructive osseous process.  There is DJD. Nonspecific soft tissue swelling about the distal right lower leg, ankle and dorsal hindfoot particular along the anterior and lateral aspect likely representing cellulitis.  No large soft tissue defect or subcutaneous emphysema seen.  Scattered atherosclerotic vascular and also nonspecific soft  tissue calcifications noted.  No radiodense retained foreign body.  Small enthesophyte at the Achilles insertion site.     Distal right lower leg, ankle and hindfoot diffuse nonspecific soft tissue swelling from edema or cellulitis with superimposed more prominent soft tissue swelling along the anterolateral aspect of the distal lower leg.  Clinical correlation advised. Additional chronic appearing findings as above. Electronically signed by: Juan Hernandez MD Date:    03/09/2023 Time:    16:51    MRI Ankle Without Contrast Right    Result Date: 3/9/2023  EXAMINATION: MRI ANKLE WITHOUT CONTRAST RIGHT CLINICAL HISTORY: Soft tissue infection suspected, ankle, xray done; TECHNIQUE: MRI ankle performed per routine protocol without contrast. COMPARISON: MRI right lower extremity from 09/01/2006.  MRI the contralateral ankle from 02/19/2021.  Radiographs of the right ankle from 03/09/2023 taken at 1543. FINDINGS: Bone: There is no evidence of acute osteomyelitis.  Marrow signal is normal.  No marrow edema or T1 hypointense signal. Articulations: There is cartilage loss of the joint between the navicular and the cuneiform bones with adjacent endplate edema and cystic change in the distal pole of the navicular.  Remaining articulations are grossly unremarkable.  There is no evidence of a large joint effusion. Tendons: Flexor, extensor, and peroneal tendons are grossly intact and demonstrate normal signal.  There is no evidence of tenosynovitis.  The Achilles tendon is intact. Ligaments: The anterior inferior and posteroinferior tibiofibular ligaments are intact.  The anterior talofibular ligament is intact.  The calcaneal fibular and posterior talofibular ligaments are intact.  The deltoid and spring ligaments are intact.  The Lisfranc ligament is not entirely included in the field of view. Soft tissues: There is a large dorsal soft tissue ulcer with exposure of the extensor hallucis tendon.  There is circumferential soft  tissue edema the ankle extending into the dorsum the hindfoot.     1. No acute osteomyelitis. 2. Large soft tissue ulcer of the dorsal aspect of the hindfoot and ankle with exposure of the extensor hallucis tendon.  Surrounding soft tissue edema and cellulitis as above. Electronically signed by: Zane Galeana Date:    03/09/2023 Time:    19:45       Assessment:     Saji Castañeda is a 73 y.o. male with a PMHx of chronic LLE osteomyelitis,T2DM c/b neuropathy, PVD, DVT in RUE (June 2022), HTN, CKD, Mobitz Type 1, NSTEMI (9/2022) presenting with worsening BLE wounds that has been getting worse over the past 3 wks. Pt will be admitted to [facility] IM for the evaluation and management of worsening BLE wound infxn.     Plan:     Wound infxn in BLE  Per Wound Care, pt has new R anterior ankle wounds w/ tendon exposure & a known L plantar foot wound w/ palpable bone in L heel from previous visit. Pt sent to ED for possible IV abx +/- debridement.  Afebrile w/ no elevations in WBC and ANC, however cannot r/o systemic infxn given pt's poor immune status 2/2 long T2DM hx  Pending B-Cx (unable to obtain second B-Cx due to pt difficulty obtaining then pt refused)  Vanc IV  Consulted ID for abx management/regiment. per ID:  Pending; appreciate recs  MRI R Ankle showed no acute osteomyelitis but showed large soft tissue ulcer of the dorsal aspect of the hindfoot and ankle with exposure of the extensor hallucis tendon w/ surrounding soft tissue edema and cellulitis.   Pending BLE U/S  Consulted Podiatry for L heel osteo eval; per Podiatry:  Pending; appreciate recs    T2DM w/ Neuropathy  Last A1c 9.9 about 5 months ago  Home Metformin and Rosuvastatin w/ insulin  POCT glucose checks  Pending A1c  Currently on Atorvastatin and SSI  Continue home Gabapentin for neuropathy  Continue home Lasix and Tamsulosin for hx inability to empty bladder 2/2 neuropathy    PAD w/ hx of DVT in RUE  DVT in RUE in June 2022  DAPT  Continue  "Eliquis    HTN  Continue home hydralazine, isodil, losartan, and nifedipine.    Hx of Type 1 Mobitz  Avoid BB  Unremarkable ROS and PE of cardiac process at this time  CTM    Hx of NSTEMI  Hx of NSTEMI in 9/2022  TTE during NSTEMI work-up showed LVEF ~60% w/ notable Pulm HTN (PA systolic pressure 41mmHg)  Unremarkable ROS and PE of cardiac process at this time  CTM    Ppx: Apixaban  Diet: Diabetic  Code: Full    Disposition: pending resolution/improvement of worsening BLE wound infxn.    Vi "Collin" MD [place]  [facility] Internal Medicine, PGY-1    [facility] Medicine Hospitalist Pager numbers:   [facility] Hospitalist Medicine Team A (Lorne/Riki): [phone]  [facility] Hospitalist Medicine Team B (Arik/Lucio):  [phone]    "

## 2023-03-10 NOTE — SUBJECTIVE & OBJECTIVE
Scheduled Meds:   apixaban  5 mg Oral BID    ascorbic acid (vitamin C)  500 mg Oral Daily    aspirin  81 mg Oral Daily    atorvastatin  80 mg Oral Daily    ceFEPime (MAXIPIME) IVPB  2 g Intravenous Q12H    clopidogreL  75 mg Oral Daily    furosemide  40 mg Oral Q48H    gabapentin  200 mg Oral BID    hydrALAZINE  25 mg Oral TID    hydroCHLOROthiazide  25 mg Oral Daily    insulin aspart U-100  20 Units Subcutaneous TID WM    insulin detemir U-100  35 Units Subcutaneous QHS    isosorbide dinitrate  10 mg Oral TID    losartan  100 mg Oral Daily    metroNIDAZOLE  500 mg Oral Q8H    NIFEdipine  60 mg Oral Daily    pantoprazole  40 mg Oral Daily    tamsulosin  0.4 mg Oral Daily    vancomycin (VANCOCIN) IVPB  2,500 mg Intravenous Once    [START ON 3/11/2023] vancomycin (VANCOCIN) IVPB  1,250 mg Intravenous Q12H     Continuous Infusions:  PRN Meds:sodium chloride 0.9%, dextrose 10%, dextrose 10%, dextrose, dextrose, glucagon (human recombinant), insulin aspart U-100, naloxone, oxyCODONE, sodium chloride 0.9%, Pharmacy to dose Vancomycin consult **AND** vancomycin - pharmacy to dose    Review of patient's allergies indicates:  No Known Allergies     Past Medical History:   Diagnosis Date    Arthritis     legs    Diabetes mellitus     Diabetes mellitus, type 2     Hyperlipidemia     Hypertension     Osteomyelitis      Past Surgical History:   Procedure Laterality Date    ANGIOGRAPHY OF LOWER EXTREMITY N/A 2/3/2021    Procedure: Angiogram Extremity Bilateral;  Surgeon: Ernst Chacko MD;  Location: [address];  Service: Peripheral Vascular;  Laterality: N/A;  7.4 mintues fluoroscopy time  816.15 mGy  170.17 Gycm2    AORTOGRAPHY WITH EXTREMITY RUNOFF Bilateral 2/3/2021    Procedure: AORTOGRAM, WITH EXTREMITY RUNOFF;  Surgeon: Ernst Chacko MD;  Location: [facility] OR [address] [place] [address];  Service: Peripheral Vascular;  Laterality: Bilateral;    DEBRIDEMENT OF FOOT Left 2/23/2021    Procedure:  DEBRIDEMENT, LEFT HEEL;  Surgeon: Mayra Schroeder DPM;  Location: [facility] OR [address] [place] [address];  Service: Podiatry;  Laterality: Left;    FOOT AMPUTATION  2010    left high midfoot amputation       Family History       Problem Relation (Age of Onset)    Cancer Brother    Diabetes Mother, Sister    Heart disease Mother    Stroke Sister          Tobacco Use    Smoking status: Never    Smokeless tobacco: Never   Substance and Sexual Activity    Alcohol use: No     Comment: occassional    Drug use: No    Sexual activity: Not Currently     Review of Systems  Objective:     Vital Signs (Most Recent):  Temp: 97.3 °F (36.3 °C) (03/10/23 1719)  Pulse: 71 (03/10/23 1719)  Resp: 20 (03/10/23 1719)  BP: (!) 157/68 (03/10/23 1719)  SpO2: 95 % (03/10/23 1719)   Vital Signs (24h Range):  Temp:  [97.3 °F (36.3 °C)-99.5 °F (37.5 °C)] 97.3 °F (36.3 °C)  Pulse:  [45-81] 71  Resp:  [16-20] 20  SpO2:  [93 %-98 %] 95 %  BP: (139-176)/(62-74) 157/68     Weight: 121.1 kg (267 lb)  Body mass index is 39.43 kg/m².    Foot Exam    Laboratory:  {[facility] Labs:230772824}    Diagnostic Results:  {[facility] Imagin}    Clinical Findings:  There was {POD MILD/MOD/SEV:31983} {Clin Fndgs:38180} at the {POD gen loc:52122} {POD pricilla loc:66001}.

## 2023-03-10 NOTE — PLAN OF CARE
"Informed by on-call Podiatrist Dr Orozco after BLE debridement that pt's LLE had expected chronic osteomyelitis but in RLE, pt now has infectious process involving the R ankle and recommended surgical consultation w/ anaerobic abx coverage. Pt will be place on Cefepime and Flagyl pending cultures.    Vi Bains MD (Nak)  [facility] Internal Medicine, PGY-1    "

## 2023-03-10 NOTE — CONSULTS
[facility] Infectious Diseases Consult Note    Primary Attending Physician: Dr. Elise  Consultant Attending: Dr. Rosa  Consultant Resident: Dr. Mcmahon    Reason for Consult:     Chronic LLE osteomyelitis with worsening wounds    Assessment and Recommendations:      Saji Casatñeda is a 73 y.o. male with chronic LLE osteomyelitis, Type 2 DM, PVD, HTN, CKD who presented with worsening left foot wound and new right foot wound. MRI of left foot pending. Right foot MRI negative for osteomyelitis. Podiatry consulted for possible bone biopsy.    Recommendations:   -Agree with Podiatry consult. Obtain bone biopsy if able.  -Okay to hold of on antibiotics if going to obtain bone biopsy and remains HDS.  -Will follow-up culture and MRI results.    Thank you for allowing us to participate in the care of this patient. Please contact me if you have any questions regarding this consult.    Kendra Mcmahon, DO  [facility] Internal Medicine/Pediatrics PGY-3  [facility]      Subjective:      History of Present Illness:  Saji Castañeda is a 73 y.o. male with a PMHx of chronic LLE osteomyelitis, T2DM, HTN, CKD, and CAD, who presents after being sent from [facility] who he follows with outpatient who noted worsening LLE wound and new RLE. Patient reports increasing pain and drainage from the area for the past week. No new numbness, swelling, or weakness. He denies any fevers, chills, nausea, vomiting, or malaise. He was admitted to [facility] for evaluation of worsening wounds.    Past Medical History:  Past Medical History:   Diagnosis Date    Arthritis     legs    Diabetes mellitus     Diabetes mellitus, type 2     Hyperlipidemia     Hypertension     Osteomyelitis        Past Surgical History:  Past Surgical History:   Procedure Laterality Date    ANGIOGRAPHY OF LOWER EXTREMITY N/A 2/3/2021    Procedure: Angiogram Extremity Bilateral;  Surgeon: Ernst Chacko MD;  Location: [facility] OR [address] [place] [address];  Service: Peripheral  Vascular;  Laterality: N/A;  7.4 mintues fluoroscopy time  816.15 mGy  170.17 Gycm2    AORTOGRAPHY WITH EXTREMITY RUNOFF Bilateral 2/3/2021    Procedure: AORTOGRAM, WITH EXTREMITY RUNOFF;  Surgeon: Ernst Chacko MD;  Location: [facility] OR 2ND FLR;  Service: Peripheral Vascular;  Laterality: Bilateral;    DEBRIDEMENT OF FOOT Left 2/23/2021    Procedure: DEBRIDEMENT, LEFT HEEL;  Surgeon: Mayra Schroeder DPM;  Location: [facility] OR 1ST FLR;  Service: Podiatry;  Laterality: Left;    FOOT AMPUTATION  October 2010    left high midfoot amputation       Allergies:  Review of patient's allergies indicates:  No Known Allergies    Medications:   In-Hospital Scheduled Medications:   apixaban  5 mg Oral BID    ascorbic acid (vitamin C)  500 mg Oral Daily    aspirin  81 mg Oral Daily    atorvastatin  80 mg Oral Daily    clopidogreL  75 mg Oral Daily    furosemide  40 mg Oral Q48H    gabapentin  200 mg Oral BID    hydrALAZINE  25 mg Oral TID    hydroCHLOROthiazide  25 mg Oral Daily    insulin aspart U-100  20 Units Subcutaneous TID WM    insulin detemir U-100  35 Units Subcutaneous QHS    isosorbide dinitrate  10 mg Oral TID    losartan  100 mg Oral Daily    NIFEdipine  60 mg Oral Daily    pantoprazole  40 mg Oral Daily    tamsulosin  0.4 mg Oral Daily      In-Hospital PRN Medications:  dextrose 10%, dextrose 10%, dextrose, dextrose, glucagon (human recombinant), insulin aspart U-100, naloxone, sodium chloride 0.9%   In-Hospital IV Infusion Medications:     Home Medications:  Prior to Admission medications    Medication Sig Start Date End Date Taking? Authorizing Provider   aspirin (ECOTRIN) 81 MG EC tablet Take 81 mg by mouth once daily.   Yes Historical Provider   B COMPLEX-VITAMIN B12 tablet Take 1 tablet by mouth once daily. 1/3/23  Yes Historical Provider   clopidogreL (PLAVIX) 75 mg tablet Take 1 tablet (75 mg total) by mouth once daily. 9/20/22 9/20/23 Yes Merlin Kearney MD   furosemide (LASIX) 40 MG tablet Take 40  "mg by mouth every 48 hours. 1/17/23  Yes Historical Provider   LANTUS SOLOSTAR U-100 INSULIN glargine 100 units/mL SubQ pen Inject 45 Units into the skin every evening. 7/8/22  Yes Salma Strauss MD   losartan-hydrochlorothiazide 100-25 mg (HYZAAR) 100-25 mg per tablet Take 1 tablet by mouth once daily. 1/23/23  Yes Historical Provider   metFORMIN (GLUCOPHAGE-XR) 500 MG ER 24hr tablet Take 1,000 mg by mouth 2 (two) times a day. 1/17/23  Yes Historical Provider   NIFEdipine (ADALAT CC) 60 MG TbSR Take 60 mg by mouth once daily. 2/6/23  Yes Historical Provider   pantoprazole (PROTONIX) 40 MG tablet Take 40 mg by mouth once daily. 2/6/23  Yes Historical Provider   rosuvastatin (CRESTOR) 40 MG Tab Take 40 mg by mouth once daily. 4/9/21  Yes Historical Provider   tamsulosin (FLOMAX) 0.4 mg Cap Take 0.4 mg by mouth once daily.   Yes Historical Provider   acetaminophen (TYLENOL) 500 MG tablet Take 1,000 mg by mouth every 8 (eight) hours as needed for Pain.    Historical Provider   apixaban (ELIQUIS) 5 mg Tab Take 1 tablet (5 mg total) by mouth 2 (two) times daily. 9/19/22   Merlin Kearney MD   ascorbic acid, vitamin C, (VITAMIN C) 500 MG tablet Take 500 mg by mouth once daily.    Historical Provider   BD ULTRA-FINE ADITYA PEN NEEDLE 32 gauge x 5/32" Ndle USE FOUR TIMES DAILY WITH MEALS AND NIGHTLY 11/20/19   Miriam Duong NP   blood sugar diagnostic (CONTOUR TEST STRIPS) Strp Inject 1 strip into the skin 2 (two) times daily before meals. 5/27/16   ANIYAH Borges, FNP   gabapentin (NEURONTIN) 100 MG capsule Take 2 capsules (200 mg total) by mouth 2 (two) times daily.  Patient not taking: Reported on 3/9/2023 12/14/20 6/20/22  Kun Shook MD   glipiZIDE (GLUCOTROL) 10 MG tablet Take 20 mg by mouth 2 (two) times a day. 2/15/23   Historical Provider   hydrALAZINE (APRESOLINE) 25 MG tablet Take 1 tablet (25 mg total) by mouth 3 (three) times daily. 7/8/22 7/8/23  Salma Strauss MD   insulin aspart U-100 " (NOVOLOG) 100 unit/mL (3 mL) InPn pen Inject 0-5 Units into the skin before meals and at bedtime as needed (Hyperglycemia). **LOW CORRECTION DOSE**  Blood Glucose  mg/dL                  Pre-meal                2200  151-200                0 unit                      0 unit  201-250                2 units                    1 unit  251-300                3 units                    1 unit  301-350                4 units                    2 units  >350                     5 units                    3 units  Administer subcutaneously if needed at times designated by monitoring schedule. 6/24/22 6/24/23  Salma Strauss MD   insulin aspart U-100 (NOVOLOG) 100 unit/mL (3 mL) InPn pen Inject 20 Units into the skin 3 (three) times daily with meals. 6/27/22 6/27/23  Salma Strauss MD   isosorbide dinitrate (ISORDIL) 10 MG tablet Take 1 tablet (10 mg total) by mouth 3 (three) times daily. 7/8/22 7/8/23  Salma Strauss MD   Lactobacillus rhamnosus GG (CULTURELLE) 10 billion cell capsule Take 1 capsule by mouth 2 (two) times daily. 6/24/22   Salma Strauss MD   MICROLET LANCET Misc  4/18/14   Historical Provider   hydroCHLOROthiazide (HYDRODIURIL) 25 MG tablet Take 0.5 tablets (12.5 mg total) by mouth every Mon, Wed, Fri. Beginning on 7/4/2022 7/4/22 7/8/22  Keerthi Mayorga MD       Family History:  Family History   Problem Relation Age of Onset    Diabetes Mother     Heart disease Mother     Stroke Sister     Cancer Brother     Diabetes Sister        Social History:  Social History     Tobacco Use    Smoking status: Never    Smokeless tobacco: Never   Substance Use Topics    Alcohol use: No     Comment: occassional    Drug use: No       Review of Systems   Constitutional:  Negative for chills, fever and malaise/fatigue.   HENT:  Negative for congestion, ear pain and sore throat.    Eyes:  Negative for discharge and redness.   Respiratory:  Negative for cough and shortness of breath.    Cardiovascular:  Negative  "for chest pain.   Gastrointestinal:  Negative for abdominal pain, diarrhea, nausea and vomiting.   Genitourinary:  Negative for dysuria, frequency and urgency.   Musculoskeletal:  Negative for falls.        - trauma   Skin:  Negative for rash.        + wounds        Objective:   Last 24 Hour Vital Signs:  BP  Min: 139/63  Max: 184/84  Temp  Av.7 °F (37.1 °C)  Min: 98.1 °F (36.7 °C)  Max: 99.5 °F (37.5 °C)  Pulse  Av.3  Min: 45  Max: 81  Resp  Av  Min: 16  Max: 20  SpO2  Av.3 %  Min: 93 %  Max: 100 %  Height  Av' 9" (175.3 cm)  Min: 5' 9" (175.3 cm)  Max: 5' 9" (175.3 cm)  Weight  Av.2 kg (271 lb 10.8 oz)  Min: 121.1 kg (267 lb)  Max: 124.3 kg (274 lb 0.5 oz)  No intake/output data recorded.    Physical Exam  Constitutional:       General: He is not in acute distress.     Appearance: He is not ill-appearing or diaphoretic.   HENT:      Nose: Nose normal. No congestion or rhinorrhea.      Mouth/Throat:      Mouth: Mucous membranes are moist.      Pharynx: Oropharynx is clear. No oropharyngeal exudate or posterior oropharyngeal erythema.   Eyes:      General:         Right eye: No discharge.         Left eye: No discharge.      Conjunctiva/sclera: Conjunctivae normal.      Pupils: Pupils are equal, round, and reactive to light.   Cardiovascular:      Rate and Rhythm: Normal rate and regular rhythm.      Pulses: Normal pulses.      Heart sounds: Normal heart sounds. No murmur heard.    No friction rub. No gallop.   Pulmonary:      Effort: Pulmonary effort is normal. No respiratory distress.      Breath sounds: Normal breath sounds. No wheezing or rales.   Abdominal:      General: Abdomen is flat. Bowel sounds are normal. There is no distension.      Palpations: Abdomen is soft.      Tenderness: There is no abdominal tenderness. There is no right CVA tenderness or left CVA tenderness.   Musculoskeletal:         General: Deformity present.      Right lower leg: Edema present.      Left lower " leg: Edema present.      Comments: Hyperpigmentation to bilateral lower extremities with non-pitting edema. Bilateral feet wrapped in bandages with foul smelling purulent drainage stage 4 wounds   Lymphadenopathy:      Cervical: No cervical adenopathy.   Skin:     General: Skin is warm and dry.      Capillary Refill: Capillary refill takes less than 2 seconds.      Findings: Lesion present.   Neurological:      General: No focal deficit present.      Mental Status: He is alert.       Laboratory Results:  Most Recent Data:  CBC:   Lab Results   Component Value Date    WBC 8.48 03/10/2023    HGB 10.1 (L) 03/10/2023    HCT 33.1 (L) 03/10/2023     03/10/2023    MCV 86 03/10/2023    RDW 15.7 (H) 03/10/2023     BMP:   Lab Results   Component Value Date     03/10/2023    K 4.8 03/10/2023     03/10/2023    CO2 25 03/10/2023    BUN 16 03/10/2023     (H) 03/10/2023    CALCIUM 8.9 03/10/2023    MG 2.0 03/10/2023    PHOS 3.2 03/10/2023     LFTs:   Lab Results   Component Value Date    PROT 8.4 03/09/2023    ALBUMIN 2.4 (L) 03/09/2023    BILITOT 0.4 03/09/2023    AST 32 03/09/2023    ALKPHOS 71 03/09/2023    ALT 28 03/09/2023     Coags:   Lab Results   Component Value Date    INR 1.1 09/18/2022     FLP:   Lab Results   Component Value Date    CHOL 91 (L) 09/19/2022    HDL 40 09/19/2022    LDLCALC 37.4 (L) 09/19/2022    TRIG 68 09/19/2022    CHOLHDL 44.0 09/19/2022     DM:   Lab Results   Component Value Date    HGBA1C 8.4 (H) 03/10/2023    HGBA1C 9.9 (H) 09/17/2022    HGBA1C 9.5 (H) 06/16/2022    LDLCALC 37.4 (L) 09/19/2022    CREATININE 1.3 03/10/2023     Thyroid:   Lab Results   Component Value Date    TSH 2.475 09/19/2022     Anemia:   Lab Results   Component Value Date    IRON 20 (L) 02/19/2021    TIBC 160 (L) 02/19/2021    FERRITIN 190 02/19/2021    HKGRUUNJ23 403 02/19/2021    FOLATE 4.2 02/19/2021     Cardiac:   Lab Results   Component Value Date    TROPONINI 0.007 09/19/2022    BNP 63 09/17/2022      Urinalysis:   Lab Results   Component Value Date    LABURIN  09/17/2022     Multiple organisms isolated. None in predominance.  Repeat if    LABURIN clinically necessary. 09/17/2022    COLORU Yellow 09/17/2022    CLARITYU Slightly Cloudy 04/22/2022    SPECGRAV >1.030 (A) 09/17/2022    NITRITE Negative 09/17/2022    KETONESU Negative 09/17/2022    UROBILINOGEN norm 04/22/2022       Trended Lab Data:  Recent Labs   Lab 03/09/23  1531 03/09/23  1618 03/10/23  0608   WBC 10.86  --  8.48   HGB 9.4*  --  10.1*   HCT 29.7*  --  33.1*     --  416   MCV 84  --  86   RDW 15.9*  --  15.7*   NA  --  138 138   K  --  3.8 4.8   CL  --  101 102   CO2  --  25 25   BUN  --  18 16   GLU  --  107 181*   PROT  --  8.4  --    ALBUMIN  --  2.4*  --    BILITOT  --  0.4  --    AST  --  32  --    ALKPHOS  --  71  --    ALT  --  28  --          Microbiology Data:  Blood culture x2 3/9: NGTD  Urine culture 3/9: NGTD    Prior culture results include Enterobacter Cloacae, Staph epi, Prevotella, GBS, and MRSA    Antimicrobials:  S/p 1 dose Vancomycin.    Other Results:  Radiology:  X-Ray Ankle Complete Right    Result Date: 3/9/2023  EXAMINATION: XR ANKLE COMPLETE 3 VIEW RIGHT CLINICAL HISTORY: Unspecified open wound, unspecified lower leg, initial encounter TECHNIQUE: AP, lateral, and oblique images of the right ankle were performed. COMPARISON: Right foot series 02/23/2022 FINDINGS: Chronic appearing deformity of the mid to distal femoral shaft presumably sequela of remote trauma.  No acute displaced fracture, dislocation or destructive osseous process.  There is DJD. Nonspecific soft tissue swelling about the distal right lower leg, ankle and dorsal hindfoot particular along the anterior and lateral aspect likely representing cellulitis.  No large soft tissue defect or subcutaneous emphysema seen.  Scattered atherosclerotic vascular and also nonspecific soft tissue calcifications noted.  No radiodense retained foreign body.  Small  enthesophyte at the Achilles insertion site.     Distal right lower leg, ankle and hindfoot diffuse nonspecific soft tissue swelling from edema or cellulitis with superimposed more prominent soft tissue swelling along the anterolateral aspect of the distal lower leg.  Clinical correlation advised. Additional chronic appearing findings as above. Electronically signed by: Juan Hernandez MD Date:    03/09/2023 Time:    16:51    US Lower Extremity Arteries Bilateral    Result Date: 3/10/2023  EXAMINATION: US LOWER EXTREMITY ARTERIES BILATERAL CLINICAL HISTORY: chronic left heel osteo;  Other chronic osteomyelitis, left ankle and foot TECHNIQUE: Bilateral lower extremity arterial duplex ultrasound examination performed. Multiple gray scale and color doppler images were obtained in addition to waveform analysis. COMPARISON: Multiple prior imaging studies including CT and ultrasound, the most recent is ultrasound left June 2022 and CT bilateral June 2022 FINDINGS: The peak systolic velocities on the right are as follows, in centimeters/second: Common femoral artery: 165 Superficial femoral artery, proximal: 149 Superficial femoral artery, mid portion: 187 Superficial femoral artery, distal: 117 Popliteal artery: 165, 185 Posterior tibial artery: 46, monophasic Anterior tibial artery: 270 The peak systolic velocities on the left are as follows, in centimeters/second: Common femoral artery: 196 Superficial femoral artery, proximal: 162 Superficial femoral artery, mid portion: 162 Superficial femoral artery, distal: 162 Popliteal artery: 106, 96, biphasic Posterior tibial artery: 89, monophasic Anterior tibial artery: 184, biphasic     No evidence of vascular occlusion or hemodynamically significant stenosis demonstrated in the right or left lower extremity arterial system.  Scattered abnormal waveforms and elevated velocities indicate the presence of vascular disease. Electronically signed by: Pat Asencio MD  Date:    03/10/2023 Time:    09:57    MRI Ankle Without Contrast Right    Result Date: 3/9/2023  EXAMINATION: MRI ANKLE WITHOUT CONTRAST RIGHT CLINICAL HISTORY: Soft tissue infection suspected, ankle, xray done; TECHNIQUE: MRI ankle performed per routine protocol without contrast. COMPARISON: MRI right lower extremity from 09/01/2006.  MRI the contralateral ankle from 02/19/2021.  Radiographs of the right ankle from 03/09/2023 taken at 1543. FINDINGS: Bone: There is no evidence of acute osteomyelitis.  Marrow signal is normal.  No marrow edema or T1 hypointense signal. Articulations: There is cartilage loss of the joint between the navicular and the cuneiform bones with adjacent endplate edema and cystic change in the distal pole of the navicular.  Remaining articulations are grossly unremarkable.  There is no evidence of a large joint effusion. Tendons: Flexor, extensor, and peroneal tendons are grossly intact and demonstrate normal signal.  There is no evidence of tenosynovitis.  The Achilles tendon is intact. Ligaments: The anterior inferior and posteroinferior tibiofibular ligaments are intact.  The anterior talofibular ligament is intact.  The calcaneal fibular and posterior talofibular ligaments are intact.  The deltoid and spring ligaments are intact.  The Lisfranc ligament is not entirely included in the field of view. Soft tissues: There is a large dorsal soft tissue ulcer with exposure of the extensor hallucis tendon.  There is circumferential soft tissue edema the ankle extending into the dorsum the hindfoot.     1. No acute osteomyelitis. 2. Large soft tissue ulcer of the dorsal aspect of the hindfoot and ankle with exposure of the extensor hallucis tendon.  Surrounding soft tissue edema and cellulitis as above. Electronically signed by: Zane Galeana Date:    03/09/2023 Time:    19:45

## 2023-03-10 NOTE — PROGRESS NOTES
Pt arrived to unit. Introduced self as VN for this shift. Admission questions completed by VN. Educated pt on safety precautions, and VN's role in pt care. Opportunity given for pt's questions. All questions answered.      03/10/23 1045   Nurse Notification   Bedside Nurse Notified? Yes   Name of Bedside Nurse Blaine Jenkins RN   Nurse Notfication Method Secure Chat   Nurse Notified Of Patient Request   Admission   Initial VN Admission Questions Complete   Communication Issues? None   Shift   Virtual Nurse - Patient Verbalized Approval Of Camera Use   Type of Frequent Check   Type Other (see comments)  (Admission)   Safety/Activity   Patient Rounds bed in low position;call light in patient/parent reach;visualized patient;placement of personal items at bedside   Safety Promotion/Fall Prevention assistive device/personal item within reach;Fall Risk reviewed with patient/family;medications reviewed;side rails raised x 2;instructed to call staff for mobility   Positioning   Body Position position changed independently   Head of Bed (HOB) Positioning HOB at 30-45 degrees   Pain/Comfort/Sleep   Preferred Pain Scale number (Numeric Rating Pain Scale)   Pain Body Location - Side Right   Pain Body Location foot   Pain Rating (0-10): Rest 10

## 2023-03-10 NOTE — CONSULTS
Attempted to see patient today, however he is not in his room, will attempt to see later after full MRI results

## 2023-03-10 NOTE — PROGRESS NOTES
Pharmacokinetic Initial Assessment: IV Vancomycin    Assessment/Plan:    Initiate intravenous vancomycin with loading dose of 2500 mg once followed by a maintenance dose of vancomycin 1250 mg IV every 12 hours  Desired empiric serum trough concentration is 15 to 20 mcg/mL  Draw vancomycin trough level 60 min prior to fourth dose on 3/12/23 at approximately 0430  Pharmacy will continue to follow and monitor vancomycin.      Please contact pharmacy at extension [phone] with any questions regarding this assessment.     Thank you for the consult,   Addie Lewis       Patient brief summary:  Saji Castañeda is a 73 y.o. male initiated on antimicrobial therapy with IV Vancomycin for treatment of suspected bone/joint infection    Drug Allergies:   Review of patient's allergies indicates:  No Known Allergies    Actual Body Weight:   121.1 kg    Renal Function:   Estimated Creatinine Clearance: 64.1 mL/min (based on SCr of 1.3 mg/dL).,     Dialysis Method (if applicable):  N/A    CBC (last 72 hours):  Recent Labs   Lab Result Units 03/09/23  1531 03/10/23  0608   WBC K/uL 10.86 8.48   Hemoglobin g/dL 9.4* 10.1*   Hemoglobin A1C %  --  8.4*   Hematocrit % 29.7* 33.1*   Platelets K/uL 374 416   Gran % % 71.6 71.8   Lymph % % 20.0 17.0*   Mono % % 6.2 7.8   Eosinophil % % 1.4 2.4   Basophil % % 0.3 0.6   Differential Method  Automated Automated       Metabolic Panel (last 72 hours):  Recent Labs   Lab Result Units 03/09/23  1618 03/10/23  0608   Sodium mmol/L 138 138   Potassium mmol/L 3.8 4.8   Chloride mmol/L 101 102   CO2 mmol/L 25 25   Glucose mg/dL 107 181*   BUN mg/dL 18 16   Creatinine mg/dL 1.4 1.3   Albumin g/dL 2.4*  --    Total Bilirubin mg/dL 0.4  --    Alkaline Phosphatase U/L 71  --    AST U/L 32  --    ALT U/L 28  --    Magnesium mg/dL  --  2.0   Phosphorus mg/dL  --  3.2       Drug levels (last 3 results):  No results for input(s): VANCOMYCINRA, VANCORANDOM, VANCOMYCINPE, VANCOPEAK, VANCOMYCINTR, VANCOTROUGH in the  last 72 hours.    Microbiologic Results:  Microbiology Results (last 7 days)       Procedure Component Value Units Date/Time    Culture, Anaerobe [[id number]]     Order Status: No result Specimen: Wound from Foot, Right     Aerobic culture [[id number]]     Order Status: No result Specimen: Wound from Foot, Right     Culture, Anaerobe [[id number]]     Order Status: No result Specimen: Wound from Foot, Left     Aerobic culture [[id number]]     Order Status: No result Specimen: Wound from Foot, Left     Blood culture #2 **CANNOT BE ORDERED STAT** [[id number]] Collected: 03/10/23 0608    Order Status: Sent Specimen: Blood Updated: 03/10/23 1344    Aerobic culture [[id number]]     Order Status: No result Specimen: Wound     Blood culture #1 **CANNOT BE ORDERED STAT** [[id number]] Collected: 03/09/23 1525    Order Status: Completed Specimen: Blood from Peripheral, Hand, Right Updated: 03/10/23 0515     Blood Culture, Routine No Growth to date

## 2023-03-10 NOTE — PHARMACY MED REC
"    [facility]           Pharmacy  Admission Medication History     The home medication history was taken by Shaye Morales.      Medication history obtained from Medications listed below were obtained from: [employer]    Based on information gathered for medication list, you may go to "Admission" then "Reconcile Home Medications" tabs to review and/or act upon those items.     The home medication list has been updated by the Pharmacy department.   Please read ALL comments highlighted in yellow.   Please address this information as you see fit.    Feel free to contact us if you have any questions or require assistance.    The medications listed below were removed from the home medication list.  Please reorder if appropriate:    Patient reports NOT TAKING the following medication(s):  Zpak 250mg  Zithromax 500mg   Tessalon 200mg    No current facility-administered medications on file prior to encounter.     Current Outpatient Medications on File Prior to Encounter   Medication Sig Dispense Refill    aspirin (ECOTRIN) 81 MG EC tablet Take 81 mg by mouth once daily.      B COMPLEX-VITAMIN B12 tablet Take 1 tablet by mouth once daily.      clopidogreL (PLAVIX) 75 mg tablet Take 1 tablet (75 mg total) by mouth once daily. 60 tablet 0    furosemide (LASIX) 40 MG tablet Take 40 mg by mouth every 48 hours.      LANTUS SOLOSTAR U-100 INSULIN glargine 100 units/mL SubQ pen Inject 45 Units into the skin every evening.  0    losartan-hydrochlorothiazide 100-25 mg (HYZAAR) 100-25 mg per tablet Take 1 tablet by mouth once daily.      metFORMIN (GLUCOPHAGE-XR) 500 MG ER 24hr tablet Take 1,000 mg by mouth 2 (two) times a day.      NIFEdipine (ADALAT CC) 60 MG TbSR Take 60 mg by mouth once daily.      pantoprazole (PROTONIX) 40 MG tablet Take 40 mg by mouth once daily.      rosuvastatin (CRESTOR) 40 MG Tab Take 40 mg by mouth once daily.      tamsulosin (FLOMAX) 0.4 mg Cap Take 0.4 mg by mouth " "once daily.      acetaminophen (TYLENOL) 500 MG tablet Take 1,000 mg by mouth every 8 (eight) hours as needed for Pain.      apixaban (ELIQUIS) 5 mg Tab Take 1 tablet (5 mg total) by mouth 2 (two) times daily. 60 tablet 0    ascorbic acid, vitamin C, (VITAMIN C) 500 MG tablet Take 500 mg by mouth once daily.      BD ULTRA-FINE ADITYA PEN NEEDLE 32 gauge x 5/32" Ndle USE FOUR TIMES DAILY WITH MEALS AND NIGHTLY 100 each 0    blood sugar diagnostic (CONTOUR TEST STRIPS) Strp Inject 1 strip into the skin 2 (two) times daily before meals. 100 strip 6    gabapentin (NEURONTIN) 100 MG capsule Take 2 capsules (200 mg total) by mouth 2 (two) times daily. (Patient not taking: Reported on 3/9/2023) 120 capsule 1    glipiZIDE (GLUCOTROL) 10 MG tablet Take 20 mg by mouth 2 (two) times a day.      hydrALAZINE (APRESOLINE) 25 MG tablet Take 1 tablet (25 mg total) by mouth 3 (three) times daily. 90 tablet 11    insulin aspart U-100 (NOVOLOG) 100 unit/mL (3 mL) InPn pen Inject 0-5 Units into the skin before meals and at bedtime as needed (Hyperglycemia). **LOW CORRECTION DOSE**  Blood Glucose  mg/dL                  Pre-meal                2200  151-200                0 unit                      0 unit  201-250                2 units                    1 unit  251-300                3 units                    1 unit  301-350                4 units                    2 units  >350                     5 units                    3 units  Administer subcutaneously if needed at times designated by monitoring schedule.  0    insulin aspart U-100 (NOVOLOG) 100 unit/mL (3 mL) InPn pen Inject 20 Units into the skin 3 (three) times daily with meals.  0    isosorbide dinitrate (ISORDIL) 10 MG tablet Take 1 tablet (10 mg total) by mouth 3 (three) times daily. 90 tablet 11    Lactobacillus rhamnosus GG (CULTURELLE) 10 billion cell capsule Take 1 capsule by mouth 2 (two) times daily.      MICROLET LANCET Misc   0       Please address this " information as you see fit.  Feel free to contact us if you have any questions or require assistance.    Shaye Morales  [phone]              .

## 2023-03-10 NOTE — PLAN OF CARE
Pt is stated to be active with wound care/ home health agency.  Pt is active with [facility]. SW sent update via [employer].        03/10/23 1414   Post-Acute Status   Post-Acute Authorization Home Health   Home Health Status Set-up Complete/Auth obtained   Discharge Delays None known at this time   Discharge Plan   Discharge Plan A Home with family;Home Health       Future Appointments   Date Time Provider Department Center   3/22/2023 10:00 AM Cesar Wu MD [facility] CARDIO [facility]     [facility]  [phone]

## 2023-03-11 LAB
ANION GAP SERPL CALC-SCNC: 6 MMOL/L (ref 8–16)
AV INDEX (PROSTH): 0.65
AV MEAN GRADIENT: 4 MMHG
AV PEAK GRADIENT: 7 MMHG
AV VALVE AREA: 2.44 CM2
AV VELOCITY RATIO: 0.71
BASOPHILS # BLD AUTO: 0.03 K/UL (ref 0–0.2)
BASOPHILS NFR BLD: 0.3 % (ref 0–1.9)
BSA FOR ECHO PROCEDURE: 2.45 M2
BUN SERPL-MCNC: 17 MG/DL (ref 8–23)
CALCIUM SERPL-MCNC: 8.5 MG/DL (ref 8.7–10.5)
CHLORIDE SERPL-SCNC: 100 MMOL/L (ref 95–110)
CO2 SERPL-SCNC: 28 MMOL/L (ref 23–29)
CREAT SERPL-MCNC: 1.4 MG/DL (ref 0.5–1.4)
CV ECHO LV RWT: 0.53 CM
DIFFERENTIAL METHOD: ABNORMAL
DOP CALC AO PEAK VEL: 1.36 M/S
DOP CALC AO VTI: 33.6 CM
DOP CALC LVOT AREA: 3.7 CM2
DOP CALC LVOT DIAMETER: 2.18 CM
DOP CALC LVOT PEAK VEL: 0.97 M/S
DOP CALC LVOT STROKE VOLUME: 82.07 CM3
DOP CALC MV VTI: 33.5 CM
DOP CALCLVOT PEAK VEL VTI: 22 CM
E WAVE DECELERATION TIME: 212.48 MSEC
E/A RATIO: 1.23
E/E' RATIO: 8.96 M/S
ECHO LV POSTERIOR WALL: 1.41 CM (ref 0.6–1.1)
EJECTION FRACTION: 70 %
EOSINOPHIL # BLD AUTO: 0.2 K/UL (ref 0–0.5)
EOSINOPHIL NFR BLD: 2.4 % (ref 0–8)
ERYTHROCYTE [DISTWIDTH] IN BLOOD BY AUTOMATED COUNT: 15.5 % (ref 11.5–14.5)
EST. GFR  (NO RACE VARIABLE): 53 ML/MIN/1.73 M^2
FRACTIONAL SHORTENING: 38 % (ref 28–44)
GLUCOSE SERPL-MCNC: 220 MG/DL (ref 70–110)
HCT VFR BLD AUTO: 26.4 % (ref 40–54)
HGB BLD-MCNC: 8.4 G/DL (ref 14–18)
IMM GRANULOCYTES # BLD AUTO: 0.02 K/UL (ref 0–0.04)
IMM GRANULOCYTES NFR BLD AUTO: 0.2 % (ref 0–0.5)
INTERVENTRICULAR SEPTUM: 1.44 CM (ref 0.6–1.1)
IVC DIAMETER: 2.4 CM
IVRT: 138.92 MSEC
LA MAJOR: 7.5 CM
LA MINOR: 6.53 CM
LEFT ATRIUM SIZE: 3.81 CM
LEFT ATRIUM VOLUME INDEX MOD: 48.1 ML/M2
LEFT ATRIUM VOLUME MOD: 112.99 CM3
LEFT INTERNAL DIMENSION IN SYSTOLE: 3.3 CM (ref 2.1–4)
LEFT VENTRICLE DIASTOLIC VOLUME INDEX: 57.35 ML/M2
LEFT VENTRICLE DIASTOLIC VOLUME: 134.78 ML
LEFT VENTRICLE MASS INDEX: 139 G/M2
LEFT VENTRICLE SYSTOLIC VOLUME INDEX: 18.8 ML/M2
LEFT VENTRICLE SYSTOLIC VOLUME: 44.08 ML
LEFT VENTRICULAR INTERNAL DIMENSION IN DIASTOLE: 5.29 CM (ref 3.5–6)
LEFT VENTRICULAR MASS: 326.18 G
LV LATERAL E/E' RATIO: 9.33 M/S
LV SEPTAL E/E' RATIO: 8.62 M/S
LVOT MG: 2.08 MMHG
LVOT MV: 0.67 CM/S
LYMPHOCYTES # BLD AUTO: 1 K/UL (ref 1–4.8)
LYMPHOCYTES NFR BLD: 11.3 % (ref 18–48)
MAGNESIUM SERPL-MCNC: 2.1 MG/DL (ref 1.6–2.6)
MCH RBC QN AUTO: 26.3 PG (ref 27–31)
MCHC RBC AUTO-ENTMCNC: 31.8 G/DL (ref 32–36)
MCV RBC AUTO: 83 FL (ref 82–98)
MONOCYTES # BLD AUTO: 0.9 K/UL (ref 0.3–1)
MONOCYTES NFR BLD: 10 % (ref 4–15)
MV A" WAVE DURATION": 167.46 MSEC
MV MEAN GRADIENT: 2 MMHG
MV PEAK A VEL: 0.91 M/S
MV PEAK E VEL: 1.12 M/S
MV PEAK GRADIENT: 5 MMHG
MV STENOSIS PRESSURE HALF TIME: 62.28 MS
MV VALVE AREA BY CONTINUITY EQUATION: 2.45 CM2
MV VALVE AREA P 1/2 METHOD: 3.53 CM2
NEUTROPHILS # BLD AUTO: 6.8 K/UL (ref 1.8–7.7)
NEUTROPHILS NFR BLD: 75.8 % (ref 38–73)
NRBC BLD-RTO: 0 /100 WBC
PHOSPHATE SERPL-MCNC: 2.7 MG/DL (ref 2.7–4.5)
PISA MRMAX VEL: 2.36 M/S
PISA TR MAX VEL: 2.64 M/S
PLATELET # BLD AUTO: 385 K/UL (ref 150–450)
PMV BLD AUTO: 8.7 FL (ref 9.2–12.9)
POCT GLUCOSE: 118 MG/DL (ref 70–110)
POCT GLUCOSE: 134 MG/DL (ref 70–110)
POCT GLUCOSE: 208 MG/DL (ref 70–110)
POCT GLUCOSE: 217 MG/DL (ref 70–110)
POTASSIUM SERPL-SCNC: 4.2 MMOL/L (ref 3.5–5.1)
PULM VEIN S/D RATIO: 1.76
PV PEAK D VEL: 0.21 M/S
PV PEAK S VEL: 0.37 M/S
RA MAJOR: 5.63 CM
RA PRESSURE: 8 MMHG
RBC # BLD AUTO: 3.2 M/UL (ref 4.6–6.2)
SINUS: 3.5 CM
SODIUM SERPL-SCNC: 134 MMOL/L (ref 136–145)
TDI LATERAL: 0.12 M/S
TDI SEPTAL: 0.13 M/S
TDI: 0.13 M/S
TR MAX PG: 28 MMHG
TV REST PULMONARY ARTERY PRESSURE: 36 MMHG
WBC # BLD AUTO: 8.92 K/UL (ref 3.9–12.7)

## 2023-03-11 PROCEDURE — 83735 ASSAY OF MAGNESIUM: CPT | Performed by: STUDENT IN AN ORGANIZED HEALTH CARE EDUCATION/TRAINING PROGRAM

## 2023-03-11 PROCEDURE — 25000003 PHARM REV CODE 250

## 2023-03-11 PROCEDURE — 63600175 PHARM REV CODE 636 W HCPCS: Performed by: STUDENT IN AN ORGANIZED HEALTH CARE EDUCATION/TRAINING PROGRAM

## 2023-03-11 PROCEDURE — 11000001 HC ACUTE MED/SURG PRIVATE ROOM

## 2023-03-11 PROCEDURE — 63600175 PHARM REV CODE 636 W HCPCS: Mod: TB,JG | Performed by: INTERNAL MEDICINE

## 2023-03-11 PROCEDURE — 85025 COMPLETE CBC W/AUTO DIFF WBC: CPT | Performed by: STUDENT IN AN ORGANIZED HEALTH CARE EDUCATION/TRAINING PROGRAM

## 2023-03-11 PROCEDURE — 25000003 PHARM REV CODE 250: Performed by: INTERNAL MEDICINE

## 2023-03-11 PROCEDURE — 25000003 PHARM REV CODE 250: Performed by: STUDENT IN AN ORGANIZED HEALTH CARE EDUCATION/TRAINING PROGRAM

## 2023-03-11 PROCEDURE — 84100 ASSAY OF PHOSPHORUS: CPT | Performed by: STUDENT IN AN ORGANIZED HEALTH CARE EDUCATION/TRAINING PROGRAM

## 2023-03-11 PROCEDURE — 36415 COLL VENOUS BLD VENIPUNCTURE: CPT | Performed by: STUDENT IN AN ORGANIZED HEALTH CARE EDUCATION/TRAINING PROGRAM

## 2023-03-11 PROCEDURE — 80048 BASIC METABOLIC PNL TOTAL CA: CPT | Performed by: STUDENT IN AN ORGANIZED HEALTH CARE EDUCATION/TRAINING PROGRAM

## 2023-03-11 RX ADMIN — HYDROCHLOROTHIAZIDE 25 MG: 25 TABLET ORAL at 10:03

## 2023-03-11 RX ADMIN — METRONIDAZOLE 500 MG: 500 TABLET ORAL at 09:03

## 2023-03-11 RX ADMIN — ISOSORBIDE DINITRATE 10 MG: 10 TABLET ORAL at 03:03

## 2023-03-11 RX ADMIN — NIFEDIPINE 60 MG: 30 TABLET, FILM COATED, EXTENDED RELEASE ORAL at 10:03

## 2023-03-11 RX ADMIN — HYDRALAZINE HYDROCHLORIDE 25 MG: 25 TABLET, FILM COATED ORAL at 03:03

## 2023-03-11 RX ADMIN — TAMSULOSIN HYDROCHLORIDE 0.4 MG: 0.4 CAPSULE ORAL at 10:03

## 2023-03-11 RX ADMIN — APIXABAN 5 MG: 5 TABLET, FILM COATED ORAL at 09:03

## 2023-03-11 RX ADMIN — METRONIDAZOLE 500 MG: 500 TABLET ORAL at 05:03

## 2023-03-11 RX ADMIN — METRONIDAZOLE 500 MG: 500 TABLET ORAL at 01:03

## 2023-03-11 RX ADMIN — CLOPIDOGREL BISULFATE 75 MG: 75 TABLET ORAL at 10:03

## 2023-03-11 RX ADMIN — GABAPENTIN 200 MG: 100 CAPSULE ORAL at 10:03

## 2023-03-11 RX ADMIN — INSULIN DETEMIR 35 UNITS: 100 INJECTION, SOLUTION SUBCUTANEOUS at 11:03

## 2023-03-11 RX ADMIN — HYDRALAZINE HYDROCHLORIDE 25 MG: 25 TABLET, FILM COATED ORAL at 09:03

## 2023-03-11 RX ADMIN — ASPIRIN 81 MG: 81 TABLET, COATED ORAL at 10:03

## 2023-03-11 RX ADMIN — CEFEPIME 2 G: 2 INJECTION, POWDER, FOR SOLUTION INTRAVENOUS at 05:03

## 2023-03-11 RX ADMIN — PANTOPRAZOLE SODIUM 40 MG: 40 TABLET, DELAYED RELEASE ORAL at 10:03

## 2023-03-11 RX ADMIN — APIXABAN 5 MG: 5 TABLET, FILM COATED ORAL at 10:03

## 2023-03-11 RX ADMIN — INSULIN ASPART 20 UNITS: 100 INJECTION, SOLUTION INTRAVENOUS; SUBCUTANEOUS at 08:03

## 2023-03-11 RX ADMIN — INSULIN ASPART 20 UNITS: 100 INJECTION, SOLUTION INTRAVENOUS; SUBCUTANEOUS at 12:03

## 2023-03-11 RX ADMIN — ISOSORBIDE DINITRATE 10 MG: 10 TABLET ORAL at 10:03

## 2023-03-11 RX ADMIN — LOSARTAN POTASSIUM 100 MG: 50 TABLET, FILM COATED ORAL at 10:03

## 2023-03-11 RX ADMIN — ATORVASTATIN CALCIUM 80 MG: 40 TABLET, FILM COATED ORAL at 10:03

## 2023-03-11 RX ADMIN — ISOSORBIDE DINITRATE 10 MG: 10 TABLET ORAL at 09:03

## 2023-03-11 RX ADMIN — FUROSEMIDE 40 MG: 40 TABLET ORAL at 09:03

## 2023-03-11 RX ADMIN — GABAPENTIN 200 MG: 100 CAPSULE ORAL at 09:03

## 2023-03-11 RX ADMIN — OXYCODONE HYDROCHLORIDE AND ACETAMINOPHEN 500 MG: 500 TABLET ORAL at 10:03

## 2023-03-11 RX ADMIN — HYDRALAZINE HYDROCHLORIDE 25 MG: 25 TABLET, FILM COATED ORAL at 10:03

## 2023-03-11 RX ADMIN — INSULIN ASPART 20 UNITS: 100 INJECTION, SOLUTION INTRAVENOUS; SUBCUTANEOUS at 05:03

## 2023-03-11 RX ADMIN — VANCOMYCIN HYDROCHLORIDE 1250 MG: 1.25 INJECTION, POWDER, LYOPHILIZED, FOR SOLUTION INTRAVENOUS at 11:03

## 2023-03-11 RX ADMIN — VANCOMYCIN HYDROCHLORIDE 1250 MG: 1.25 INJECTION, POWDER, LYOPHILIZED, FOR SOLUTION INTRAVENOUS at 10:03

## 2023-03-11 NOTE — HPI
Pt is a 74 y/o male  has a past medical history of Arthritis, Diabetes mellitus, Diabetes mellitus, type 2, Hyperlipidemia, Hypertension, and Osteomyelitis. Consulted to Podiatry for bilateral foot wounds. He sees Dr. Farrell in wound clinic. Relates to bilateral foot pain. No further pedal complaints.

## 2023-03-11 NOTE — CONSULTS
[facility] - Telemetry  Podiatry  Consult Note    Patient Name: Saji Castañeda  MRN: [id number]  Admission Date: 3/9/2023  Hospital Length of Stay: 0 days  Attending Physician: Vinod Elise MD  Primary Care Provider: [facility]     Consults  Subjective:     History of Present Illness:  Pt is a 74 y/o male  has a past medical history of Arthritis, Diabetes mellitus, Diabetes mellitus, type 2, Hyperlipidemia, Hypertension, and Osteomyelitis. Consulted to Podiatry for bilateral foot wounds. He sees Dr. Farrell in wound clinic. Relates to bilateral foot pain. No further pedal complaints.      Scheduled Meds:   apixaban  5 mg Oral BID    ascorbic acid (vitamin C)  500 mg Oral Daily    aspirin  81 mg Oral Daily    atorvastatin  80 mg Oral Daily    ceFEPime (MAXIPIME) IVPB  2 g Intravenous Q12H    clopidogreL  75 mg Oral Daily    furosemide  40 mg Oral Q48H    gabapentin  200 mg Oral BID    hydrALAZINE  25 mg Oral TID    hydroCHLOROthiazide  25 mg Oral Daily    insulin aspart U-100  20 Units Subcutaneous TID WM    insulin detemir U-100  35 Units Subcutaneous QHS    isosorbide dinitrate  10 mg Oral TID    losartan  100 mg Oral Daily    metroNIDAZOLE  500 mg Oral Q8H    NIFEdipine  60 mg Oral Daily    pantoprazole  40 mg Oral Daily    tamsulosin  0.4 mg Oral Daily    vancomycin (VANCOCIN) IVPB  2,500 mg Intravenous Once    [START ON 3/11/2023] vancomycin (VANCOCIN) IVPB  1,250 mg Intravenous Q12H     Continuous Infusions:  PRN Meds:sodium chloride 0.9%, dextrose 10%, dextrose 10%, dextrose, dextrose, glucagon (human recombinant), insulin aspart U-100, naloxone, oxyCODONE, sodium chloride 0.9%, Pharmacy to dose Vancomycin consult **AND** vancomycin - pharmacy to dose    Review of patient's allergies indicates:  No Known Allergies     Past Medical History:   Diagnosis Date    Arthritis     legs    Diabetes mellitus     Diabetes mellitus, type 2     Hyperlipidemia     Hypertension      Osteomyelitis      Past Surgical History:   Procedure Laterality Date    ANGIOGRAPHY OF LOWER EXTREMITY N/A 2/3/2021    Procedure: Angiogram Extremity Bilateral;  Surgeon: Ernst Chacko MD;  Location: [facility] OR [address] [place] [address];  Service: Peripheral Vascular;  Laterality: N/A;  7.4 mintues fluoroscopy time  816.15 mGy  170.17 Gycm2    AORTOGRAPHY WITH EXTREMITY RUNOFF Bilateral 2/3/2021    Procedure: AORTOGRAM, WITH EXTREMITY RUNOFF;  Surgeon: Ernst Chacko MD;  Location: [facility] OR [address] [place] [address];  Service: Peripheral Vascular;  Laterality: Bilateral;    DEBRIDEMENT OF FOOT Left 2/23/2021    Procedure: DEBRIDEMENT, LEFT HEEL;  Surgeon: Mayra Schroeder DPM;  Location: [facility] OR [address] [place] [address];  Service: Podiatry;  Laterality: Left;    FOOT AMPUTATION  October 2010    left high midfoot amputation       Family History       Problem Relation (Age of Onset)    Cancer Brother    Diabetes Mother, Sister    Heart disease Mother    Stroke Sister          Tobacco Use    Smoking status: Never    Smokeless tobacco: Never   Substance and Sexual Activity    Alcohol use: No     Comment: occassional    Drug use: No    Sexual activity: Not Currently     Review of Systems   Constitutional:  Negative for activity change, chills, diaphoresis and fever.   HENT:  Negative for congestion and hearing loss.    Respiratory:  Negative for cough and shortness of breath.    Cardiovascular:  Positive for leg swelling.   Gastrointestinal:  Negative for nausea and vomiting.   Musculoskeletal:  Negative for back pain, gait problem, joint swelling and myalgias.   Skin:  Positive for color change, pallor and wound. Negative for rash.   Neurological:  Positive for numbness. Negative for tremors, speech difficulty and weakness.   Psychiatric/Behavioral:  Negative for agitation and confusion. The patient is not nervous/anxious.    Objective:     Vital Signs (Most Recent):  Temp: 97.3 °F (36.3 °C) (03/10/23 1719)  Pulse: 71 (03/10/23 1719)  Resp: 20  (03/10/23 1719)  BP: (!) 157/68 (03/10/23 1719)  SpO2: 95 % (03/10/23 1719)   Vital Signs (24h Range):  Temp:  [97.3 °F (36.3 °C)-99.5 °F (37.5 °C)] 97.3 °F (36.3 °C)  Pulse:  [45-81] 71  Resp:  [16-20] 20  SpO2:  [95 %-98 %] 95 %  BP: (139-176)/(62-74) 157/68     Weight: 121.1 kg (267 lb)  Body mass index is 39.43 kg/m².    Foot Exam    General  General Appearance: appears stated age and healthy   Orientation: alert and oriented to person, place, and time   Affect: appropriate       Right Foot/Ankle     Inspection and Palpation  Ecchymosis: none  Tenderness: none   Swelling: none   Skin Exam: ulcer;     Neurovascular  Dorsalis pedis: absent  Posterior tibial: absent  Saphenous nerve sensation: diminished  Tibial nerve sensation: diminished  Superficial peroneal nerve sensation: diminished  Deep peroneal nerve sensation: diminished  Sural nerve sensation: diminished      Left Foot/Ankle      Inspection and Palpation  Ecchymosis: none  Tenderness: none   Swelling: none   Skin Exam: ulcer;     Neurovascular  Dorsalis pedis: absent  Posterior tibial: absent  Saphenous nerve sensation: diminished  Tibial nerve sensation: diminished  Superficial peroneal nerve sensation: diminished  Deep peroneal nerve sensation: diminished  Sural nerve sensation: diminished        Laboratory:  A1C:   Recent Labs   Lab 09/17/22  1609 03/10/23  0608   HGBA1C 9.9* 8.4*     Blood Cultures:   Recent Labs   Lab 03/09/23  1525   LABBLOO No Growth to date     CBC:   Recent Labs   Lab 03/10/23  0608   WBC 8.48   RBC 3.87*   HGB 10.1*   HCT 33.1*      MCV 86   MCH 26.1*   MCHC 30.5*     CMP:   Recent Labs   Lab 03/09/23  1618 03/10/23  0608    181*   CALCIUM 9.2 8.9   ALBUMIN 2.4*  --    PROT 8.4  --     138   K 3.8 4.8   CO2 25 25    102   BUN 18 16   CREATININE 1.4 1.3   ALKPHOS 71  --    ALT 28  --    AST 32  --    BILITOT 0.4  --      CRP: No results for input(s): CRP in the last 168 hours.  ESR: No results for  input(s): SEDRATE in the last 168 hours.  Prealbumin: No results for input(s): PREALBUMIN in the last 48 hours.  Wound Cultures: No results for input(s): LABAERO in the last 4320 hours.  Microbiology Results (last 7 days)       Procedure Component Value Units Date/Time    Culture, Anaerobe [[id number]] Collected: 03/10/23 1741    Order Status: Sent Specimen: Wound from Leg, Left Updated: 03/10/23 1809    Aerobic culture [[id number]] Collected: 03/10/23 1741    Order Status: Sent Specimen: Wound from Leg, Left Updated: 03/10/23 1808    Aerobic culture [[id number]] Collected: 03/10/23 1741    Order Status: Sent Specimen: Wound from Leg, Left Updated: 03/10/23 1808    Culture, Anaerobe [[id number]] Collected: 03/10/23 1741    Order Status: Sent Specimen: Wound from Leg, Left Updated: 03/10/23 1808    Aerobic culture [[id number]] Collected: 03/10/23 1741    Order Status: Sent Specimen: Wound from Leg, Left Updated: 03/10/23 1807    Blood culture #2 **CANNOT BE ORDERED STAT** [[id number]] Collected: 03/10/23 0608    Order Status: Sent Specimen: Blood Updated: 03/10/23 1344    Blood culture #1 **CANNOT BE ORDERED STAT** [[id number]] Collected: 03/09/23 1525    Order Status: Completed Specimen: Blood from Peripheral, Hand, Right Updated: 03/10/23 0515     Blood Culture, Routine No Growth to date          Specimen (24h ago, onward)      None            Diagnostic Results:  MRI: I have reviewed all pertinent results/findings within the past 24 hours.  reviewed  X-Ray: I have reviewed all pertinent results/findings within the past 24 hours.  reviewed    Clinical Findings:    Ulcer x3 to posterior left heel with no acute signs of infection. Posterior left heel ulcer measures 3.0x4.0x0.2cm with bone exposure. Middle ulcer measures 2.5x2.5x0.3cm and probes to bone. Distal ulcer measures 0.5x0.5x0.4cm and probes to bone. All wounds with mixed fibrogranular base, no malodor.       Ulcer to right anterior ankle, post debridement, measures  4.0x5.0x2.5cm probes to bone over ankle joint, with exposed tendon. With mixed granular and fibronecrotic base. Malodorous    Pre-debridement      Post-debridement        Assessment/Plan:     Neuro  Diabetic neuropathy  Management per Hospital Medicine    Cardiac/Vascular  Peripheral arterial disease  Recommend Interventional Cardiology consult    ID  * Subacute osteomyelitis of left foot  MRI reviewed with osteomyelitis to left heel. Heel wound without acute signs of infection, site with bone exposure. Bone debridement performed today and sent for culture, see procedure note. Continue z-flex boots to both lower extremities. Nursing orders in for wound care. Antibiotic management per Infectious Disease    Acute osteomyelitis of right ankle  Malodorous acutely infected ulcer to right anterior ankle which probes to level of bone and deep to ankle joint. Wound debridement to include soft tissue, tendon and bone, sent for culture. General Surgery consult placed for further management and consideration of BKA. Podiatry will sign off        Thank you for your consult. I will sign off. Please contact us if you have any additional questions.    Barbara Orozco DPM  Podiatry  [facility] - Telemetry

## 2023-03-11 NOTE — ASSESSMENT & PLAN NOTE
MRI reviewed with osteomyelitis to left heel. Heel wound without acute signs of infection, site with bone exposure. Bone debridement performed today and sent for culture, see procedure note. Continue z-flex boots to both lower extremities. Nursing orders in for wound care. Antibiotic management per Infectious Disease

## 2023-03-11 NOTE — CONSULTS
Today`s Date: 3/10/2023     Admit Date: 3/9/2023    Admitting Physician: Vinod Elise MD    Patient`s Name: Saji Castañeda , 73 y.o. male    Reason for consultation  Wound evaluation with possible osteomyelitis   Patient Active Problem List:     Edema of leg     Tinea pedis     Wound, open, foot with complication     Acute hematogenous osteomyelitis of left foot     Diabetic neuropathy     Essential hypertension     Type 2 diabetes, uncontrolled, with neuropathy     Type II diabetes mellitus with complication, uncontrolled     Cataract cortical, senile     Cortical age-related cataract of right eye     Chest pain     YNES (acute kidney injury)     Chronic kidney disease, stage III (moderate)     Anemia of chronic disease     Ischemic ulcer diabetic foot     Asymptomatic Mobitz (type) I (Wenckebach's) atrioventricular block     Peripheral arterial disease     Open wound of left lower leg     Skin ulcer of right calf with fat layer exposed     Skin ulcer of left calf with fat layer exposed     Dermatitis     PVD (peripheral vascular disease)     Cellulitis of left ankle     Osteomyelitis of left lower extremity     Pressure injury of left heel, stage 4     Hypokalemia     Morbid obesity     Limitation of activities due to disability     Iron deficiency anemia     Acute osteomyelitis of calcaneum, left     Leg swelling     Frequency of urination     Foot pain, left     Acute renal insufficiency     Acute deep vein thrombosis (DVT) of right upper extremity     Bradycardia     Pre-procedural cardiovascular examination     Nonhealing ulcer of left lower leg with fat layer exposed     Chronic osteomyelitis of ankle and foot, left     Nonhealing ulcer of right lower leg with fat layer exposed     Hypergranulation     Non healing left heel wound     Subacute osteomyelitis of left foot     Acute osteomyelitis of right ankle      Past Medical History:  No date: Arthritis      Comment:  legs  No date: Diabetes mellitus  No  date: Diabetes mellitus, type 2  No date: Hyperlipidemia  No date: Hypertension  No date: Osteomyelitis    Past Surgical History:  2/3/2021: ANGIOGRAPHY OF LOWER EXTREMITY; N/A      Comment:  Procedure: Angiogram Extremity Bilateral;  Surgeon:                Ernst Chacko MD;  [facility];                 Service: Peripheral Vascular;  Laterality: N/A;  7.4                mintues fluoroscopy [id number] [id number] Gycm2  2/3/2021: AORTOGRAPHY WITH EXTREMITY RUNOFF; Bilateral      Comment:  Procedure: AORTOGRAM, WITH EXTREMITY RUNOFF;  Surgeon:                Ernst Chacko MD;  [facility];                 Service: Peripheral Vascular;  Laterality: Bilateral;  2/23/2021: DEBRIDEMENT OF FOOT; Left      Comment:  Procedure: DEBRIDEMENT, LEFT HEEL;  Surgeon: Mayra Schroeder DPM;  Location: [facility];  Service:               Podiatry;  Laterality: Left;  October 2010: FOOT AMPUTATION      Comment:  left high midfoot amputation    Prior to Admission medications :  Medication aspirin (ECOTRIN) 81 MG EC tablet, Sig Take 81 mg by mouth once daily., Start Date , End Date , Taking? Yes, Authorizing Provider Historical Provider    Medication B COMPLEX-VITAMIN B12 tablet, Sig Take 1 tablet by mouth once daily., Start Date 1/3/23, End Date , Taking? Yes, Authorizing Provider Historical Provider    Medication clopidogreL (PLAVIX) 75 mg tablet, Sig Take 1 tablet (75 mg total) by mouth once daily., Start Date 9/20/22, End Date 9/20/23, Taking? Yes, Authorizing Provider Merlin Kearney MD    Medication furosemide (LASIX) 40 MG tablet, Sig Take 40 mg by mouth every 48 hours., Start Date 1/17/23, End Date , Taking? Yes, Authorizing Provider Historical Provider    Medication LANTUS SOLOSTAR U-100 INSULIN glargine 100 units/mL SubQ pen, Sig Inject 45 Units into the skin every evening., Start Date 7/8/22, End Date , Taking? Yes, Authorizing Provider Salma Strauss,  "MD    Medication losartan-hydrochlorothiazide 100-25 mg (HYZAAR) 100-25 mg per tablet, Sig Take 1 tablet by mouth once daily., Start Date 1/23/23, End Date , Taking? Yes, Authorizing Provider Historical Provider    Medication metFORMIN (GLUCOPHAGE-XR) 500 MG ER 24hr tablet, Sig Take 1,000 mg by mouth 2 (two) times a day., Start Date 1/17/23, End Date , Taking? Yes, Authorizing Provider Historical Provider    Medication NIFEdipine (ADALAT CC) 60 MG TbSR, Sig Take 60 mg by mouth once daily., Start Date 2/6/23, End Date , Taking? Yes, Authorizing Provider Historical Provider    Medication pantoprazole (PROTONIX) 40 MG tablet, Sig Take 40 mg by mouth once daily., Start Date 2/6/23, End Date , Taking? Yes, Authorizing Provider Historical Provider    Medication rosuvastatin (CRESTOR) 40 MG Tab, Sig Take 40 mg by mouth once daily., Start Date 4/9/21, End Date , Taking? Yes, Authorizing Provider Historical Provider    Medication tamsulosin (FLOMAX) 0.4 mg Cap, Sig Take 0.4 mg by mouth once daily., Start Date , End Date , Taking? Yes, Authorizing Provider Historical Provider    Medication acetaminophen (TYLENOL) 500 MG tablet, Sig Take 1,000 mg by mouth every 8 (eight) hours as needed for Pain., Start Date , End Date , Taking? , Authorizing Provider Historical Provider    Medication apixaban (ELIQUIS) 5 mg Tab, Sig Take 1 tablet (5 mg total) by mouth 2 (two) times daily., Start Date 9/19/22, End Date , Taking? , Authorizing Provider Merlin Kearney MD    Medication ascorbic acid, vitamin C, (VITAMIN C) 500 MG tablet, Sig Take 500 mg by mouth once daily., Start Date , End Date , Taking? , Authorizing Provider Historical Provider    Medication BD ULTRA-FINE ADITYA PEN NEEDLE 32 gauge x 5/32" Ndle, Sig USE FOUR TIMES DAILY WITH MEALS AND NIGHTLY, Start Date 11/20/19, End Date , Taking? , Authorizing Provider Miriam Duong NP    Medication blood sugar diagnostic (CONTOUR TEST STRIPS) Strp, Sig Inject 1 strip into the skin 2 (two) " times daily before meals., Start Date 5/27/16, End Date , Taking? , Authorizing Provider Rachel Smith, APRN, FNP    Medication gabapentin (NEURONTIN) 100 MG capsule, Sig Take 2 capsules (200 mg total) by mouth 2 (two) times daily.Patient not taking: Reported on 3/9/2023, Start Date 12/14/20, End Date 6/20/22, Taking? , Authorizing Provider Kun Shook MD    Medication glipiZIDE (GLUCOTROL) 10 MG tablet, Sig Take 20 mg by mouth 2 (two) times a day., Start Date 2/15/23, End Date , Taking? , Authorizing Provider Historical Provider    Medication hydrALAZINE (APRESOLINE) 25 MG tablet, Sig Take 1 tablet (25 mg total) by mouth 3 (three) times daily., Start Date 7/8/22, End Date 7/8/23, Taking? , Authorizing Provider Salma Strauss MD    Medication insulin aspart U-100 (NOVOLOG) 100 unit/mL (3 mL) InPn pen, Sig Inject 0-5 Units into the skin before meals and at bedtime as needed (Hyperglycemia). **LOW CORRECTION DOSE**Blood Glucosemg/dL                  Pre-meal                [id number]                0 unit                      0 [id number]                2 units                    1 [id number]                3 units                    1 [id number]                4 units                    2 units>350                     5 units                    3 unitsAdminister subcutaneously if needed at times designated by monitoring schedule., Start Date 6/24/22, End Date 6/24/23, Taking? , Authorizing Provider Salma Strauss MD    Medication insulin aspart U-100 (NOVOLOG) 100 unit/mL (3 mL) InPn pen, Sig Inject 20 Units into the skin 3 (three) times daily with meals., Start Date 6/27/22, End Date 6/27/23, Taking? , Authorizing Provider Salma Strauss MD    Medication isosorbide dinitrate (ISORDIL) 10 MG tablet, Sig Take 1 tablet (10 mg total) by mouth 3 (three) times daily., Start Date 7/8/22, End Date 7/8/23, Taking? , Authorizing Provider Salma Strauss MD    Medication Lactobacillus rhamnosus GG  (CULTURELLE) 10 billion cell capsule, Sig Take 1 capsule by mouth 2 (two) times daily., Start Date 6/24/22, End Date , Taking? , Authorizing Provider Salma Strauss MD    Medication MICROLET LANCET Misc, Sig , Start Date 4/18/14, End Date , Taking? , Authorizing Provider Historical Provider    Medication hydroCHLOROthiazide (HYDRODIURIL) 25 MG tablet, Sig Take 0.5 tablets (12.5 mg total) by mouth every Mon, Wed, Fri. Beginning on 7/4/2022, Start Date 7/4/22, End Date 7/8/22, Taking? , Authorizing Provider Keerthi Mayorga MD      No current facility-administered medications on file prior to encounter.  Current Outpatient Medications on File Prior to Encounter:  aspirin (ECOTRIN) 81 MG EC tablet, Take 81 mg by mouth once daily., Disp: , Rfl:   B COMPLEX-VITAMIN B12 tablet, Take 1 tablet by mouth once daily., Disp: , Rfl:   clopidogreL (PLAVIX) 75 mg tablet, Take 1 tablet (75 mg total) by mouth once daily., Disp: 60 tablet, Rfl: 0  furosemide (LASIX) 40 MG tablet, Take 40 mg by mouth every 48 hours., Disp: , Rfl:   LANTUS SOLOSTAR U-100 INSULIN glargine 100 units/mL SubQ pen, Inject 45 Units into the skin every evening., Disp: , Rfl: 0  losartan-hydrochlorothiazide 100-25 mg (HYZAAR) 100-25 mg per tablet, Take 1 tablet by mouth once daily., Disp: , Rfl:   metFORMIN (GLUCOPHAGE-XR) 500 MG ER 24hr tablet, Take 1,000 mg by mouth 2 (two) times a day., Disp: , Rfl:   NIFEdipine (ADALAT CC) 60 MG TbSR, Take 60 mg by mouth once daily., Disp: , Rfl:   pantoprazole (PROTONIX) 40 MG tablet, Take 40 mg by mouth once daily., Disp: , Rfl:   rosuvastatin (CRESTOR) 40 MG Tab, Take 40 mg by mouth once daily., Disp: , Rfl:   tamsulosin (FLOMAX) 0.4 mg Cap, Take 0.4 mg by mouth once daily., Disp: , Rfl:   acetaminophen (TYLENOL) 500 MG tablet, Take 1,000 mg by mouth every 8 (eight) hours as needed for Pain., Disp: , Rfl:   apixaban (ELIQUIS) 5 mg Tab, Take 1 tablet (5 mg total) by mouth 2 (two) times daily., Disp: 60 tablet, Rfl:  "0  ascorbic acid, vitamin C, (VITAMIN C) 500 MG tablet, Take 500 mg by mouth once daily., Disp: , Rfl:   BD ULTRA-FINE ADITYA PEN NEEDLE 32 gauge x 5/32" Ndle, USE FOUR TIMES DAILY WITH MEALS AND NIGHTLY, Disp: 100 each, Rfl: 0  blood sugar diagnostic (CONTOUR TEST STRIPS) Strp, Inject 1 strip into the skin 2 (two) times daily before meals., Disp: 100 strip, Rfl: 6  gabapentin (NEURONTIN) 100 MG capsule, Take 2 capsules (200 mg total) by mouth 2 (two) times daily. (Patient not taking: Reported on 3/9/2023), Disp: 120 capsule, Rfl: 1  glipiZIDE (GLUCOTROL) 10 MG tablet, Take 20 mg by mouth 2 (two) times a day., Disp: , Rfl:   hydrALAZINE (APRESOLINE) 25 MG tablet, Take 1 tablet (25 mg total) by mouth 3 (three) times daily., Disp: 90 tablet, Rfl: 11  insulin aspart U-100 (NOVOLOG) 100 unit/mL (3 mL) InPn pen, Inject 0-5 Units into the skin before meals and at bedtime as needed (Hyperglycemia). **LOW CORRECTION DOSE**Blood Glucosemg/dL                  Pre-meal                [id number]                0 unit                      0 [id number]                2 units                    1 [id number]                3 units                    1 [id number]                4 units                    2 units>350                     5 units                    3 unitsAdminister subcutaneously if needed at times designated by monitoring schedule., Disp: , Rfl: 0  insulin aspart U-100 (NOVOLOG) 100 unit/mL (3 mL) InPn pen, Inject 20 Units into the skin 3 (three) times daily with meals., Disp: , Rfl: 0  isosorbide dinitrate (ISORDIL) 10 MG tablet, Take 1 tablet (10 mg total) by mouth 3 (three) times daily., Disp: 90 tablet, Rfl: 11  Lactobacillus rhamnosus GG (CULTURELLE) 10 billion cell capsule, Take 1 capsule by mouth 2 (two) times daily., Disp: , Rfl:   MICROLET LANCET Misc, , Disp: , Rfl: 0  [DISCONTINUED] hydroCHLOROthiazide (HYDRODIURIL) 25 MG tablet, Take 0.5 tablets (12.5 mg total) by mouth every Mon, Wed, Fri. Beginning on " 7/4/2022, Disp: , Rfl:          Review of patient's allergies indicates:  No Known Allergies    Social History:   reports that he has never smoked. He has never used smokeless tobacco. He reports that he does not drink alcohol and does not use drugs.     Review of patient's family history indicates:      PHYSICAL EXAMINATION  Temp:  [97.3 °F (36.3 °C)-99.5 °F (37.5 °C)] 97.3 °F (36.3 °C)  Pulse:  [45-81] 71  Resp:  [16-20] 20  SpO2:  [95 %-98 %] 95 %  BP: (139-176)/(62-74) 157/68    General Condition:   Alert x 3     Head & Neck  Anemia: None  Jaundice: None  Neck vein: Not distended  Carotid Bruits: none  Lymph nodes: none palpable  Thyroid: normal    Chest: normal    Heart: normal    Rt. Breast: not examined  Lt. Breast: not examined  Axillary lymph nodes: none    Abdomen: Soft,  None tender with no palpable mass or organ  Hernia: none    Rectal: Defered    Extremities: infected diabetic left TMA stump  and I&D    Vascular: normal    Specific focus Examination    Imp: diabetic infected left foot with osteomyelitis    Plan: check bone culture ID consult ,local wound care may need BKA

## 2023-03-11 NOTE — ASSESSMENT & PLAN NOTE
Malodorous acutely infected ulcer to right anterior ankle which probes to level of bone and deep to ankle joint. Wound debridement to include soft tissue, tendon and bone, sent for culture. General Surgery consult placed for further management and consideration of BKA. Podiatry will sign off

## 2023-03-11 NOTE — PROCEDURES
"Saji Castañeda is a 73 y.o. male patient.    Temp: 97.3 °F (36.3 °C) (03/10/23 1719)  Pulse: 71 (03/10/23 1719)  Resp: 20 (03/10/23 1719)  BP: (!) 157/68 (03/10/23 1719)  SpO2: 95 % (03/10/23 1719)  Weight: 121.1 kg (267 lb) (03/10/23 1021)  Height: 5' 9" (175.3 cm) (03/10/23 1021)       Debridement    Date/Time: 3/10/2023 6:58 PM  Performed by: Barbara Orozco DPM  Authorized by: Barbara Orozco DPM     Consent Done?:  Yes (Verbal)  Local anesthesia used?: No      Wound Details:    Location:  Right foot    Location:  Right Ankle    Type of Debridement:  Excisional       Length (cm):  4       Area (sq cm):  20       Width (cm):  5       Percent Debrided (%):  100       Depth (cm):  2.5       Total Area Debrided (sq cm):  20    Depth of debridement:  Bone    Tissue debrided:  Subcutaneous, Bone, Muscle and Tendon    Devitalized tissue debrided:  Biofilm, Callus, Fibrin and Necrotic/Eschar    Instruments:  Blade and Curette    Bleeding:  Moderate  Hemostasis Achieved: Yes    Method Used:  Pressure  Patient tolerance:  Patient tolerated the procedure well with no immediate complications     Bone debridement with bone cultures taken during this visit     3/10/2023    "

## 2023-03-11 NOTE — PROGRESS NOTES
"Surgery follow up  BP (!) 167/71 (BP Location: Right arm, Patient Position: Lying)   Pulse 87   Temp 97.6 °F (36.4 °C) (Oral)   Resp 20   Ht 5' 9" (1.753 m)   Wt 123.3 kg (271 lb 13.2 oz)   SpO2 (!) 93%   BMI 40.14 kg/m²   I/O last 3 completed shifts:  In: 200 [P.O.:200]  Out: 775 [Urine:775]  No intake/output data recorded.    Recent Results (from the past 336 hour(s))   CBC with Automated Differential    Collection Time: 03/11/23  5:36 AM   Result Value Ref Range    WBC 8.92 3.90 - 12.70 K/uL    Hemoglobin 8.4 (L) 14.0 - 18.0 g/dL    Hematocrit 26.4 (L) 40.0 - 54.0 %    Platelets 385 150 - 450 K/uL   CBC with Automated Differential    Collection Time: 03/10/23  6:08 AM   Result Value Ref Range    WBC 8.48 3.90 - 12.70 K/uL    Hemoglobin 10.1 (L) 14.0 - 18.0 g/dL    Hematocrit 33.1 (L) 40.0 - 54.0 %    Platelets 416 150 - 450 K/uL   CBC auto differential    Collection Time: 03/09/23  3:31 PM   Result Value Ref Range    WBC 10.86 3.90 - 12.70 K/uL    Hemoglobin 9.4 (L) 14.0 - 18.0 g/dL    Hematocrit 29.7 (L) 40.0 - 54.0 %    Platelets 374 150 - 450 K/uL     Awaiting culture reports and ID recommendation .  "

## 2023-03-11 NOTE — PROCEDURES
"Saji Castañeda is a 73 y.o. male patient.    Temp: 97.3 °F (36.3 °C) (03/10/23 1719)  Pulse: 71 (03/10/23 1719)  Resp: 20 (03/10/23 1719)  BP: (!) 157/68 (03/10/23 1719)  SpO2: 95 % (03/10/23 1719)  Weight: 121.1 kg (267 lb) (03/10/23 1021)  Height: 5' 9" (175.3 cm) (03/10/23 1021)       Debridement    Date/Time: 3/10/2023 7:11 PM  Performed by: Barbara Orozco DPM  Authorized by: Barbara Orozco DPM     Consent Done?:  Yes (Verbal)  Local anesthesia used?: No      Wound Details:    Location:  Left foot    Location:  Left Heel    Type of Debridement:  Excisional       Length (cm):  3       Area (sq cm):  12       Width (cm):  4       Percent Debrided (%):  100       Depth (cm):  0.2       Total Area Debrided (sq cm):  12    Depth of debridement:  Bone    Tissue debrided:  Subcutaneous, Other and Bone    Devitalized tissue debrided:  Biofilm, Callus and Fibrin    Instruments:  Blade and Curette    2nd Wound Details:     Location:  Left foot    Location:  Left Plantar    Location:  Left Plantar       Length (cm):  2.5       Area (sq cm):  6.25       Width (cm):  2.5       Percent Debrided (%):  100       Depth (cm):  0.3       Total Area Debrided (sq cm):  6.25    Depth of debridement:  Bone    Tissue debrided:  Bone    Devitalized tissue debrided:  Necrotic/Eschar and Biofilm    Instruments:  Blade and Curette    3rd Wound Details:     Location:  Left foot    Location:  Left Midfoot    Location:  Left Midfoot    Type of Debridement:  Excisional       Length (cm):  0.5       Area (sq cm):  0.25       Width (cm):  0.5       Percent Debrided (%):  100       Depth (cm):  0.4       Total Area Debrided (sq cm):  0.25    Depth of debridement:  Bone    Tissue debrided:  Bone    Devitalized tissue debrided:  Biofilm and Necrotic/Eschar    Instruments:  Blade and Curette    Bleeding:  Minimal  Patient tolerance:  Patient tolerated the procedure well with no immediate complications     Bone cultures were taken during this " visit     3/10/2023

## 2023-03-11 NOTE — NURSING
Shift assessment complete. Pt is alert and oriented x 4. Denies pain at this time. IV antibiotics infusing per orders. Right and left foot dressings clean, dry, intact. Bed in lowest position, locked, and side rails up x 3. Bed alarm on. Call light in reach.

## 2023-03-11 NOTE — PROGRESS NOTES
Patient reviewed, renal function stable, cultures reviewed, no new levels, continue current therapy;     Next levels due: 3/12/2023 @ 08:00

## 2023-03-11 NOTE — SUBJECTIVE & OBJECTIVE
Scheduled Meds:   apixaban  5 mg Oral BID    ascorbic acid (vitamin C)  500 mg Oral Daily    aspirin  81 mg Oral Daily    atorvastatin  80 mg Oral Daily    ceFEPime (MAXIPIME) IVPB  2 g Intravenous Q12H    clopidogreL  75 mg Oral Daily    furosemide  40 mg Oral Q48H    gabapentin  200 mg Oral BID    hydrALAZINE  25 mg Oral TID    hydroCHLOROthiazide  25 mg Oral Daily    insulin aspart U-100  20 Units Subcutaneous TID WM    insulin detemir U-100  35 Units Subcutaneous QHS    isosorbide dinitrate  10 mg Oral TID    losartan  100 mg Oral Daily    metroNIDAZOLE  500 mg Oral Q8H    NIFEdipine  60 mg Oral Daily    pantoprazole  40 mg Oral Daily    tamsulosin  0.4 mg Oral Daily    vancomycin (VANCOCIN) IVPB  2,500 mg Intravenous Once    [START ON 3/11/2023] vancomycin (VANCOCIN) IVPB  1,250 mg Intravenous Q12H     Continuous Infusions:  PRN Meds:sodium chloride 0.9%, dextrose 10%, dextrose 10%, dextrose, dextrose, glucagon (human recombinant), insulin aspart U-100, naloxone, oxyCODONE, sodium chloride 0.9%, Pharmacy to dose Vancomycin consult **AND** vancomycin - pharmacy to dose    Review of patient's allergies indicates:  No Known Allergies     Past Medical History:   Diagnosis Date    Arthritis     legs    Diabetes mellitus     Diabetes mellitus, type 2     Hyperlipidemia     Hypertension     Osteomyelitis      Past Surgical History:   Procedure Laterality Date    ANGIOGRAPHY OF LOWER EXTREMITY N/A 2/3/2021    Procedure: Angiogram Extremity Bilateral;  Surgeon: Ernst Chacko MD;  Location: [address];  Service: Peripheral Vascular;  Laterality: N/A;  7.4 mintues fluoroscopy time  816.15 mGy  170.17 Gycm2    AORTOGRAPHY WITH EXTREMITY RUNOFF Bilateral 2/3/2021    Procedure: AORTOGRAM, WITH EXTREMITY RUNOFF;  Surgeon: Ernst Chacko MD;  Location: [address];  Service: Peripheral Vascular;  Laterality: Bilateral;    DEBRIDEMENT OF FOOT Left 2/23/2021    Procedure: DEBRIDEMENT, LEFT HEEL;  Surgeon:  Mayra Schroeder DPM;  Location: [address];  Service: Podiatry;  Laterality: Left;    FOOT AMPUTATION  October 2010    left high midfoot amputation       Family History       Problem Relation (Age of Onset)    Cancer Brother    Diabetes Mother, Sister    Heart disease Mother    Stroke Sister          Tobacco Use    Smoking status: Never    Smokeless tobacco: Never   Substance and Sexual Activity    Alcohol use: No     Comment: occassional    Drug use: No    Sexual activity: Not Currently     Review of Systems   Constitutional:  Negative for activity change, chills, diaphoresis and fever.   HENT:  Negative for congestion and hearing loss.    Respiratory:  Negative for cough and shortness of breath.    Cardiovascular:  Positive for leg swelling.   Gastrointestinal:  Negative for nausea and vomiting.   Musculoskeletal:  Negative for back pain, gait problem, joint swelling and myalgias.   Skin:  Positive for color change, pallor and wound. Negative for rash.   Neurological:  Positive for numbness. Negative for tremors, speech difficulty and weakness.   Psychiatric/Behavioral:  Negative for agitation and confusion. The patient is not nervous/anxious.    Objective:     Vital Signs (Most Recent):  Temp: 97.3 °F (36.3 °C) (03/10/23 1719)  Pulse: 71 (03/10/23 1719)  Resp: 20 (03/10/23 1719)  BP: (!) 157/68 (03/10/23 1719)  SpO2: 95 % (03/10/23 1719)   Vital Signs (24h Range):  Temp:  [97.3 °F (36.3 °C)-99.5 °F (37.5 °C)] 97.3 °F (36.3 °C)  Pulse:  [45-81] 71  Resp:  [16-20] 20  SpO2:  [95 %-98 %] 95 %  BP: (139-176)/(62-74) 157/68     Weight: 121.1 kg (267 lb)  Body mass index is 39.43 kg/m².    Foot Exam    General  General Appearance: appears stated age and healthy   Orientation: alert and oriented to person, place, and time   Affect: appropriate       Right Foot/Ankle     Inspection and Palpation  Ecchymosis: none  Tenderness: none   Swelling: none   Skin Exam: ulcer;     Neurovascular  Dorsalis pedis:  absent  Posterior tibial: absent  Saphenous nerve sensation: diminished  Tibial nerve sensation: diminished  Superficial peroneal nerve sensation: diminished  Deep peroneal nerve sensation: diminished  Sural nerve sensation: diminished      Left Foot/Ankle      Inspection and Palpation  Ecchymosis: none  Tenderness: none   Swelling: none   Skin Exam: ulcer;     Neurovascular  Dorsalis pedis: absent  Posterior tibial: absent  Saphenous nerve sensation: diminished  Tibial nerve sensation: diminished  Superficial peroneal nerve sensation: diminished  Deep peroneal nerve sensation: diminished  Sural nerve sensation: diminished        Laboratory:  A1C:   Recent Labs   Lab 09/17/22  1609 03/10/23  0608   HGBA1C 9.9* 8.4*     Blood Cultures:   Recent Labs   Lab 03/09/23  1525   LABBLOO No Growth to date     CBC:   Recent Labs   Lab 03/10/23  0608   WBC 8.48   RBC 3.87*   HGB 10.1*   HCT 33.1*      MCV 86   MCH 26.1*   MCHC 30.5*     CMP:   Recent Labs   Lab 03/09/23  1618 03/10/23  0608    181*   CALCIUM 9.2 8.9   ALBUMIN 2.4*  --    PROT 8.4  --     138   K 3.8 4.8   CO2 25 25    102   BUN 18 16   CREATININE 1.4 1.3   ALKPHOS 71  --    ALT 28  --    AST 32  --    BILITOT 0.4  --      CRP: No results for input(s): CRP in the last 168 hours.  ESR: No results for input(s): SEDRATE in the last 168 hours.  Prealbumin: No results for input(s): PREALBUMIN in the last 48 hours.  Wound Cultures: No results for input(s): LABAERO in the last 4320 hours.  Microbiology Results (last 7 days)       Procedure Component Value Units Date/Time    Culture, Anaerobe [[id number]] Collected: 03/10/23 1741    Order Status: Sent Specimen: Wound from Leg, Left Updated: 03/10/23 1809    Aerobic culture [[id number]] Collected: 03/10/23 1741    Order Status: Sent Specimen: Wound from Leg, Left Updated: 03/10/23 1808    Aerobic culture [[id number]] Collected: 03/10/23 1741    Order Status: Sent Specimen: Wound from Leg, Left  Updated: 03/10/23 1808    Culture, Anaerobe [[id number]] Collected: 03/10/23 1741    Order Status: Sent Specimen: Wound from Leg, Left Updated: 03/10/23 1808    Aerobic culture [[id number]] Collected: 03/10/23 1741    Order Status: Sent Specimen: Wound from Leg, Left Updated: 03/10/23 1807    Blood culture #2 **CANNOT BE ORDERED STAT** [[id number]] Collected: 03/10/23 0608    Order Status: Sent Specimen: Blood Updated: 03/10/23 1344    Blood culture #1 **CANNOT BE ORDERED STAT** [[id number]] Collected: 03/09/23 1525    Order Status: Completed Specimen: Blood from Peripheral, Hand, Right Updated: 03/10/23 0515     Blood Culture, Routine No Growth to date          Specimen (24h ago, onward)      None            Diagnostic Results:  MRI: I have reviewed all pertinent results/findings within the past 24 hours.  reviewed  X-Ray: I have reviewed all pertinent results/findings within the past 24 hours.  reviewed    Clinical Findings:    Ulcer x3 to posterior left heel with no acute signs of infection. Posterior left heel ulcer measures 3.0x4.0x0.2cm with bone exposure. Middle ulcer measures 2.5x2.5x0.3cm and probes to bone. Distal ulcer measures 0.5x0.5x0.4cm and probes to bone. All wounds with mixed fibrogranular base, no malodor.       Ulcer to right anterior ankle, post debridement, measures 4.0x5.0x2.5cm probes to bone over ankle joint, with exposed tendon. With mixed granular and fibronecrotic base. Malodorous    Pre-debridement      Post-debridement

## 2023-03-11 NOTE — PROGRESS NOTES
"[facility] Medicine Progress Note    Primary Team: [facility] [place] Hospitalist Team B  Attending Physician: Vinod Elise MD  Resident: Bethany Ruiz MD  Intern: Vi Bains MD (Nak)    Hospital Day: 0 days    Chief Complaint: Chronic BLE wounds w/ superimposed cellulitis worsening over 3wks    Subjective:   NAEON. Pt tolerate the left wound excisional biopsy yesterday. Patient seen and examined by me this morning. Pt denies any worsening pain in lower extremities. Pt denies fevers, chills, N/V, SOB, chest pain/pressure, abd pain, or numbness/weakness.  Pt remain afebrile w/ stable vitals on room air and no leukocytosis. Pt evaluated by surgery  yesterday. Blood culture grow staph    Review of Systems   All other systems reviewed and are negative.      Objective:   Last 24 Hour Vital Signs:  BP  Min: 139/63  Max: 192/81  Temp  Av °F (37.2 °C)  Min: 97.3 °F (36.3 °C)  Max: 100.2 °F (37.9 °C)  Pulse  Av.7  Min: 53  Max: 92  Resp  Av.3  Min: 18  Max: 20  SpO2  Av.7 %  Min: 93 %  Max: 97 %  Weight  Av.3 kg (271 lb 13.2 oz)  Min: 123.3 kg (271 lb 13.2 oz)  Max: 123.3 kg (271 lb 13.2 oz)    Intake/Output Summary (Last 24 hours) at 3/11/2023 1059  Last data filed at 3/11/2023 0615  Gross per 24 hour   Intake 200 ml   Output 775 ml   Net -575 ml        Physical Examination:  Physical Exam     Physical Exam   Constitutional: He is oriented to person, place, and time. normal appearance. He appears obese.  Non-toxic appearance. No distress. He does not appear ill.   HENT:   Mouth/Throat: Mucous membranes are moist. Oropharynx is clear.   Eyes: Pupils are equal, round, and reactive to light.   Cardiovascular: Normal rate, regular rhythm, normal heart sounds and normal pulses.  Pulmonary:     Effort: Pulmonary effort is normal.      Breath sounds: Normal breath sounds.   Abdominal: Soft. Normal appearance and bowel sounds are normal.   Musculoskeletal:         General: Swelling and deformity present.      " Comments: See media tab on 3/9/23   Neurological: He is alert and oriented to person, place, and time.   Skin: Skin is warm and dry.   See media tab on 3/9/23   Psychiatric: His behavior is normal.      Laboratory:  Recent Labs   Lab 03/09/23  1531 03/09/23  1618 03/10/23  0608 03/11/23  0536   WBC 10.86  --  8.48 8.92   HGB 9.4*  --  10.1* 8.4*   HCT 29.7*  --  33.1* 26.4*     --  416 385   MCV 84  --  86 83   RDW 15.9*  --  15.7* 15.5*   NA  --  138 138 134*   K  --  3.8 4.8 4.2   CL  --  101 102 100   CO2  --  25 25 28   BUN  --  18 16 17   CREATININE  --  1.4 1.3 1.4   GLU  --  107 181* 220*   PROT  --  8.4  --   --    ALBUMIN  --  2.4*  --   --    BILITOT  --  0.4  --   --    AST  --  32  --   --    ALKPHOS  --  71  --   --    ALT  --  28  --   --      Microbiology Results (last 7 days)       Procedure Component Value Units Date/Time    Blood culture #1 **CANNOT BE ORDERED STAT** [[id number]]  (Abnormal) Collected: 03/09/23 1525    Order Status: Completed Specimen: Blood from Peripheral, Hand, Right Updated: 03/11/23 0741     Blood Culture, Routine Gram stain aer bottle: Gram positive cocci in clusters resembling Staph      Results called to and read back by:Hamlet Mullen RN 03/10/2023  20:36      STAPHYLOCOCCUS SPECIES  Identification and susceptibility pending      Aerobic culture [[id number]] Collected: 03/10/23 1741    Order Status: Sent Specimen: Wound from Leg, Left Updated: 03/10/23 2255    Culture, Anaerobe [[id number]] Collected: 03/10/23 1741    Order Status: Sent Specimen: Wound from Leg, Left Updated: 03/10/23 2255    MRSA/SA Rapid ID by PCR from Blood culture [[id number]] Collected: 03/09/23 1525    Order Status: Completed Updated: 03/10/23 2214     Staph aureus ID by PCR Negative     MRSA ID by PCR Negative    Blood culture #2 **CANNOT BE ORDERED STAT** [[id number]] Collected: 03/10/23 0608    Order Status: Completed Specimen: Blood Updated: 03/10/23 2115     Blood Culture, Routine No Growth to  date    Aerobic culture [[id number]] Collected: 03/10/23 1741    Order Status: Canceled Specimen: Wound from Leg, Left     Culture, Anaerobe [[id number]] Collected: 03/10/23 1741    Order Status: Canceled Specimen: Wound from Leg, Left     Aerobic culture [[id number]] Collected: 03/10/23 1741    Order Status: Canceled Specimen: Wound from Leg, Left           I have personally reviewed the above laboratory studies.     Imaging:  EKG (my interpretation): no indication for EKG this admit at this time    X-Ray Ankle Complete Right    Result Date: 3/9/2023  EXAMINATION: XR ANKLE COMPLETE 3 VIEW RIGHT CLINICAL HISTORY: Unspecified open wound, unspecified lower leg, initial encounter TECHNIQUE: AP, lateral, and oblique images of the right ankle were performed. COMPARISON: Right foot series 02/23/2022 FINDINGS: Chronic appearing deformity of the mid to distal femoral shaft presumably sequela of remote trauma.  No acute displaced fracture, dislocation or destructive osseous process.  There is DJD. Nonspecific soft tissue swelling about the distal right lower leg, ankle and dorsal hindfoot particular along the anterior and lateral aspect likely representing cellulitis.  No large soft tissue defect or subcutaneous emphysema seen.  Scattered atherosclerotic vascular and also nonspecific soft tissue calcifications noted.  No radiodense retained foreign body.  Small enthesophyte at the Achilles insertion site.     Distal right lower leg, ankle and hindfoot diffuse nonspecific soft tissue swelling from edema or cellulitis with superimposed more prominent soft tissue swelling along the anterolateral aspect of the distal lower leg.  Clinical correlation advised. Additional chronic appearing findings as above. Electronically signed by: Juan Hernandze MD Date:    03/09/2023 Time:    16:51    MRI Ankle Without Contrast Right    Result Date: 3/9/2023  EXAMINATION: MRI ANKLE WITHOUT CONTRAST RIGHT CLINICAL HISTORY: Soft tissue infection  suspected, ankle, xray done; TECHNIQUE: MRI ankle performed per routine protocol without contrast. COMPARISON: MRI right lower extremity from 09/01/2006.  MRI the contralateral ankle from 02/19/2021.  Radiographs of the right ankle from 03/09/2023 taken at 1543. FINDINGS: Bone: There is no evidence of acute osteomyelitis.  Marrow signal is normal.  No marrow edema or T1 hypointense signal. Articulations: There is cartilage loss of the joint between the navicular and the cuneiform bones with adjacent endplate edema and cystic change in the distal pole of the navicular.  Remaining articulations are grossly unremarkable.  There is no evidence of a large joint effusion. Tendons: Flexor, extensor, and peroneal tendons are grossly intact and demonstrate normal signal.  There is no evidence of tenosynovitis.  The Achilles tendon is intact. Ligaments: The anterior inferior and posteroinferior tibiofibular ligaments are intact.  The anterior talofibular ligament is intact.  The calcaneal fibular and posterior talofibular ligaments are intact.  The deltoid and spring ligaments are intact.  The Lisfranc ligament is not entirely included in the field of view. Soft tissues: There is a large dorsal soft tissue ulcer with exposure of the extensor hallucis tendon.  There is circumferential soft tissue edema the ankle extending into the dorsum the hindfoot.     1. No acute osteomyelitis. 2. Large soft tissue ulcer of the dorsal aspect of the hindfoot and ankle with exposure of the extensor hallucis tendon.  Surrounding soft tissue edema and cellulitis as above. Electronically signed by: Zane Galeana Date:    03/09/2023 Time:    19:45       Current Medications:     Scheduled:   apixaban  5 mg Oral BID    ascorbic acid (vitamin C)  500 mg Oral Daily    aspirin  81 mg Oral Daily    atorvastatin  80 mg Oral Daily    ceFEPime (MAXIPIME) IVPB  2 g Intravenous Q12H    clopidogreL  75 mg Oral Daily    furosemide  40 mg Oral Q48H     gabapentin  200 mg Oral BID    hydrALAZINE  25 mg Oral TID    hydroCHLOROthiazide  25 mg Oral Daily    insulin aspart U-100  20 Units Subcutaneous TID WM    insulin detemir U-100  35 Units Subcutaneous QHS    isosorbide dinitrate  10 mg Oral TID    losartan  100 mg Oral Daily    metroNIDAZOLE  500 mg Oral Q8H    NIFEdipine  60 mg Oral Daily    pantoprazole  40 mg Oral Daily    tamsulosin  0.4 mg Oral Daily    vancomycin (VANCOCIN) IVPB  1,250 mg Intravenous Q12H         Infusions:       PRN:  sodium chloride 0.9%, dextrose 10%, dextrose 10%, dextrose, dextrose, glucagon (human recombinant), insulin aspart U-100, naloxone, oxyCODONE, sodium chloride 0.9%, Pharmacy to dose Vancomycin consult **AND** vancomycin - pharmacy to dose    Antibiotics and Day Number of Therapy:  Antibiotics (From admission, onward)      Start     Stop Route Frequency Ordered    03/11/23 0900  vancomycin 1,250 mg in dextrose 5 % (D5W) 250 mL IVPB (Vial-Mate)         -- IV Every 12 hours (non-standard times) 03/10/23 1655    03/10/23 2200  metroNIDAZOLE tablet 500 mg         -- Oral Every 8 hours 03/10/23 1627    03/10/23 1730  ceFEPIme (MAXIPIME) 2 g in dextrose 5 % in water (D5W) 5 % 50 mL IVPB (MB+)         -- IV Every 12 hours (non-standard times) 03/10/23 1627    03/10/23 1725  vancomycin - pharmacy to dose  (vancomycin IVPB)        See Hyperspace for full Linked Orders Report.    -- IV pharmacy to manage frequency 03/10/23 1627          Assessment:     Saji Castañeda is a 73 y.o. male with a PMHx of chronic LLE osteomyelitis,T2DM c/b neuropathy, PVD, DVT in RUE (June 2022), HTN, CKD, Mobitz Type 1, NSTEMI (9/2022) presenting with worsening BLE wounds that has been getting worse over the past 3 wks. Pt will be admitted to [facility] for the evaluation and management of chronic BLE wounds w/ superimposed cellulitis    Plan:     Chronic BLE wounds w/ superimposed cellulitis  Per Wound Care, pt has new R anterior ankle wounds w/ tendon exposure &  a known L plantar foot wound w/ palpable bone in L heel from previous visit. Pt sent to ED for possible IV abx +/- debridement.  Afebrile w/ no elevations in WBC and ANC, however cannot r/o systemic infxn given pt's poor immune status 2/2 long T2DM hx  1st B-Cx NGTD; unable to obtain 2nd B-Cx due to difficulty obtaining and ultimately pt refusal  Vanc IV  Consulted ID for abx management/regiment given hx of receiving dapto. per ID:  Pending; appreciate recs  MRI R Ankle showed no acute osteomyelitis but showed large soft tissue ulcer of the dorsal aspect of the hindfoot and ankle with exposure of the extensor hallucis tendon w/ surrounding soft tissue edema and cellulitis.   BLE U/S showed no evidence of vasc occlusion/stenosis in RLE/LLE arterial system, but does display findings suggestive of vasc ds   MRI L Foot showed Changes of osteomyelitis involving the posterior calcaneus where there is a large soft tissue defect.  It is similar to 02/19/2021 examination.  There is edema involving the distal Achilles tendon near its insertion onto the calcaneus as well.  Consulted Podiatry for L heel osteo eval; per Podiatry:  Pending; appreciate recs (Excisional biopsy done 3/10/23 afternoon per Dr Orozco)  - Blood culture positive for staph. Ordered TTE; pending  -Tissue culture form left leg: pending  -Surgical consulted, check bone culture ID consult ,local wound care may need BKA    T2DM w/ Neuropathy  Last A1c 9.9 about 5 months ago  Home Metformin and Rosuvastatin w/ insulin  POCT glucose checks  A1c 8.4  Currently on Atorvastatin and SSI  Continue home Gabapentin for neuropathy  Continue home Lasix and Tamsulosin for hx inability to empty bladder 2/2 neuropathy     PAD w/ hx of DVT in RUE  DVT in RUE in June 2022  DAPT  Continue Eliquis     HTN  Continue home hydralazine, isodil, losartan, and nifedipine.     Hx of Type 1 Mobitz  Avoid BB  Unremarkable ROS and PE suggestive of cardiac process at this time  CTM      Hx of NSTEMI  Hx of NSTEMI in 9/2022  TTE during NSTEMI work-up showed LVEF ~60% w/ notable Pulm HTN (PA systolic pressure 41mmHg)  Unremarkable ROS and PE suggestive of cardiac process at this time  CTM     Ppx: Apixaban  Diet: Diabetic  Code: Full     Disposition: pending resolution/improvement of worsening BLE wound infxn.    Jayesh Hoang MD  \[facility] [place] [facility]\"" Internal Medicine, PGY-1    \[facility] [place] [facility]\"" Medicine Hospitalist Pager numbers:   \[facility] [place] [facility]\"" Hospitalist Medicine Team A (Lorne/Riki): [phone]  \[facility] [place] [facility]\"" Hospitalist Medicine Team B (Arik/Lucio):  [phone]       Yes

## 2023-03-12 LAB
ANION GAP SERPL CALC-SCNC: 10 MMOL/L (ref 8–16)
BASOPHILS # BLD AUTO: 0.03 K/UL (ref 0–0.2)
BASOPHILS NFR BLD: 0.3 % (ref 0–1.9)
BUN SERPL-MCNC: 23 MG/DL (ref 8–23)
CALCIUM SERPL-MCNC: 8.6 MG/DL (ref 8.7–10.5)
CHLORIDE SERPL-SCNC: 99 MMOL/L (ref 95–110)
CO2 SERPL-SCNC: 25 MMOL/L (ref 23–29)
CREAT SERPL-MCNC: 1.7 MG/DL (ref 0.5–1.4)
DIFFERENTIAL METHOD: ABNORMAL
EOSINOPHIL # BLD AUTO: 0.3 K/UL (ref 0–0.5)
EOSINOPHIL NFR BLD: 3.6 % (ref 0–8)
ERYTHROCYTE [DISTWIDTH] IN BLOOD BY AUTOMATED COUNT: 15.7 % (ref 11.5–14.5)
EST. GFR  (NO RACE VARIABLE): 42 ML/MIN/1.73 M^2
GLUCOSE SERPL-MCNC: 167 MG/DL (ref 70–110)
HCT VFR BLD AUTO: 27.5 % (ref 40–54)
HGB BLD-MCNC: 8.7 G/DL (ref 14–18)
IMM GRANULOCYTES # BLD AUTO: 0.04 K/UL (ref 0–0.04)
IMM GRANULOCYTES NFR BLD AUTO: 0.4 % (ref 0–0.5)
LYMPHOCYTES # BLD AUTO: 1 K/UL (ref 1–4.8)
LYMPHOCYTES NFR BLD: 11.6 % (ref 18–48)
MAGNESIUM SERPL-MCNC: 2.1 MG/DL (ref 1.6–2.6)
MCH RBC QN AUTO: 26.2 PG (ref 27–31)
MCHC RBC AUTO-ENTMCNC: 31.6 G/DL (ref 32–36)
MCV RBC AUTO: 83 FL (ref 82–98)
MONOCYTES # BLD AUTO: 0.8 K/UL (ref 0.3–1)
MONOCYTES NFR BLD: 8.5 % (ref 4–15)
NEUTROPHILS # BLD AUTO: 6.7 K/UL (ref 1.8–7.7)
NEUTROPHILS NFR BLD: 75.6 % (ref 38–73)
NRBC BLD-RTO: 0 /100 WBC
PHOSPHATE SERPL-MCNC: 3.3 MG/DL (ref 2.7–4.5)
PLATELET # BLD AUTO: 409 K/UL (ref 150–450)
PMV BLD AUTO: 9 FL (ref 9.2–12.9)
POCT GLUCOSE: 113 MG/DL (ref 70–110)
POCT GLUCOSE: 151 MG/DL (ref 70–110)
POCT GLUCOSE: 171 MG/DL (ref 70–110)
POCT GLUCOSE: 193 MG/DL (ref 70–110)
POTASSIUM SERPL-SCNC: 3.9 MMOL/L (ref 3.5–5.1)
RBC # BLD AUTO: 3.32 M/UL (ref 4.6–6.2)
SODIUM SERPL-SCNC: 134 MMOL/L (ref 136–145)
VANCOMYCIN TROUGH SERPL-MCNC: 28 UG/ML (ref 10–22)
WBC # BLD AUTO: 8.91 K/UL (ref 3.9–12.7)

## 2023-03-12 PROCEDURE — 80048 BASIC METABOLIC PNL TOTAL CA: CPT | Performed by: STUDENT IN AN ORGANIZED HEALTH CARE EDUCATION/TRAINING PROGRAM

## 2023-03-12 PROCEDURE — 80202 ASSAY OF VANCOMYCIN: CPT | Performed by: INTERNAL MEDICINE

## 2023-03-12 PROCEDURE — 63600175 PHARM REV CODE 636 W HCPCS: Performed by: STUDENT IN AN ORGANIZED HEALTH CARE EDUCATION/TRAINING PROGRAM

## 2023-03-12 PROCEDURE — 11000001 HC ACUTE MED/SURG PRIVATE ROOM

## 2023-03-12 PROCEDURE — 36415 COLL VENOUS BLD VENIPUNCTURE: CPT | Performed by: INTERNAL MEDICINE

## 2023-03-12 PROCEDURE — 85025 COMPLETE CBC W/AUTO DIFF WBC: CPT | Performed by: STUDENT IN AN ORGANIZED HEALTH CARE EDUCATION/TRAINING PROGRAM

## 2023-03-12 PROCEDURE — 25000003 PHARM REV CODE 250

## 2023-03-12 PROCEDURE — 25000003 PHARM REV CODE 250: Performed by: STUDENT IN AN ORGANIZED HEALTH CARE EDUCATION/TRAINING PROGRAM

## 2023-03-12 PROCEDURE — 83735 ASSAY OF MAGNESIUM: CPT | Performed by: STUDENT IN AN ORGANIZED HEALTH CARE EDUCATION/TRAINING PROGRAM

## 2023-03-12 PROCEDURE — 87040 BLOOD CULTURE FOR BACTERIA: CPT | Performed by: INTERNAL MEDICINE

## 2023-03-12 PROCEDURE — 84100 ASSAY OF PHOSPHORUS: CPT | Performed by: STUDENT IN AN ORGANIZED HEALTH CARE EDUCATION/TRAINING PROGRAM

## 2023-03-12 RX ORDER — INSULIN ASPART 100 [IU]/ML
1-10 INJECTION, SOLUTION INTRAVENOUS; SUBCUTANEOUS
Status: DISCONTINUED | OUTPATIENT
Start: 2023-03-12 | End: 2023-03-22 | Stop reason: HOSPADM

## 2023-03-12 RX ADMIN — HYDRALAZINE HYDROCHLORIDE 25 MG: 25 TABLET, FILM COATED ORAL at 08:03

## 2023-03-12 RX ADMIN — GABAPENTIN 200 MG: 100 CAPSULE ORAL at 10:03

## 2023-03-12 RX ADMIN — INSULIN DETEMIR 35 UNITS: 100 INJECTION, SOLUTION SUBCUTANEOUS at 09:03

## 2023-03-12 RX ADMIN — INSULIN ASPART 20 UNITS: 100 INJECTION, SOLUTION INTRAVENOUS; SUBCUTANEOUS at 08:03

## 2023-03-12 RX ADMIN — INSULIN ASPART 1 UNITS: 100 INJECTION, SOLUTION INTRAVENOUS; SUBCUTANEOUS at 09:03

## 2023-03-12 RX ADMIN — METRONIDAZOLE 500 MG: 500 TABLET ORAL at 02:03

## 2023-03-12 RX ADMIN — CLOPIDOGREL BISULFATE 75 MG: 75 TABLET ORAL at 10:03

## 2023-03-12 RX ADMIN — ASPIRIN 81 MG: 81 TABLET, COATED ORAL at 10:03

## 2023-03-12 RX ADMIN — GABAPENTIN 200 MG: 100 CAPSULE ORAL at 08:03

## 2023-03-12 RX ADMIN — CEFEPIME 2 G: 2 INJECTION, POWDER, FOR SOLUTION INTRAVENOUS at 05:03

## 2023-03-12 RX ADMIN — HYDRALAZINE HYDROCHLORIDE 25 MG: 25 TABLET, FILM COATED ORAL at 02:03

## 2023-03-12 RX ADMIN — INSULIN ASPART 20 UNITS: 100 INJECTION, SOLUTION INTRAVENOUS; SUBCUTANEOUS at 12:03

## 2023-03-12 RX ADMIN — ATORVASTATIN CALCIUM 80 MG: 40 TABLET, FILM COATED ORAL at 10:03

## 2023-03-12 RX ADMIN — APIXABAN 5 MG: 5 TABLET, FILM COATED ORAL at 08:03

## 2023-03-12 RX ADMIN — CEFEPIME 2 G: 2 INJECTION, POWDER, FOR SOLUTION INTRAVENOUS at 04:03

## 2023-03-12 RX ADMIN — HYDRALAZINE HYDROCHLORIDE 25 MG: 25 TABLET, FILM COATED ORAL at 10:03

## 2023-03-12 RX ADMIN — ISOSORBIDE DINITRATE 10 MG: 10 TABLET ORAL at 02:03

## 2023-03-12 RX ADMIN — LOSARTAN POTASSIUM 100 MG: 50 TABLET, FILM COATED ORAL at 10:03

## 2023-03-12 RX ADMIN — ISOSORBIDE DINITRATE 10 MG: 10 TABLET ORAL at 08:03

## 2023-03-12 RX ADMIN — INSULIN ASPART 20 UNITS: 100 INJECTION, SOLUTION INTRAVENOUS; SUBCUTANEOUS at 04:03

## 2023-03-12 RX ADMIN — PANTOPRAZOLE SODIUM 40 MG: 40 TABLET, DELAYED RELEASE ORAL at 10:03

## 2023-03-12 RX ADMIN — HYDROCHLOROTHIAZIDE 25 MG: 25 TABLET ORAL at 10:03

## 2023-03-12 RX ADMIN — APIXABAN 5 MG: 5 TABLET, FILM COATED ORAL at 10:03

## 2023-03-12 RX ADMIN — METRONIDAZOLE 500 MG: 500 TABLET ORAL at 05:03

## 2023-03-12 RX ADMIN — ISOSORBIDE DINITRATE 10 MG: 10 TABLET ORAL at 10:03

## 2023-03-12 RX ADMIN — NIFEDIPINE 60 MG: 30 TABLET, FILM COATED, EXTENDED RELEASE ORAL at 10:03

## 2023-03-12 RX ADMIN — TAMSULOSIN HYDROCHLORIDE 0.4 MG: 0.4 CAPSULE ORAL at 10:03

## 2023-03-12 RX ADMIN — OXYCODONE HYDROCHLORIDE AND ACETAMINOPHEN 500 MG: 500 TABLET ORAL at 10:03

## 2023-03-12 RX ADMIN — METRONIDAZOLE 500 MG: 500 TABLET ORAL at 09:03

## 2023-03-12 NOTE — ASSESSMENT & PLAN NOTE
Saji Castañeda is a 73 y.o. male with a PMHx of chronic LLE osteomyelitis, T2DM, HTN, CKD, and CAD, who presents after being sent from [facility] who he follows with outpatient who noted worsening LLE wound and new RLE. Patient reports increasing pain and drainage from the area for the past week. No new numbness, swelling, or weakness. He denies any fevers, chills, nausea, vomiting, or malaise. He was admitted to [facility] for evaluation of worsening wounds.    Patient placed on vanc, cefepime and flagyl - MRI showed no OM of the right ankle but this lesion probed to bone. MRI showed OM of the left heel - this was debridede and cultures pending.  Blood now growing 1/2 bottles of a CNS - staph pasteur.    1. Keep on vanc, cefepime and flagyl  - awaits wound cultures  - now growing proteus - sensitivity to follow   2. Staph pasteur is a CNS but not part of the skin bossman - for now keep on current abx - will treat this specifically as well - await sensitivities   Repeat BC NGTD

## 2023-03-12 NOTE — PLAN OF CARE
Problem: Adult Inpatient Plan of Care  Goal: Plan of Care Review  Outcome: Ongoing, Progressing  Goal: Optimal Comfort and Wellbeing  Outcome: Ongoing, Progressing     Problem: Bariatric Environmental Safety  Goal: Safety Maintained with Care  Outcome: Ongoing, Progressing     Problem: Impaired Wound Healing  Goal: Optimal Wound Healing  Outcome: Ongoing, Progressing     Problem: Hypertension Comorbidity  Goal: Blood Pressure in Desired Range  Outcome: Ongoing, Progressing

## 2023-03-12 NOTE — PROGRESS NOTES
[facility]  Infectious Disease  Progress Note    Patient Name: Saji Castañeda  MRN: [id number]  Admission Date: 3/9/2023  Length of Stay: 1 days  Attending Physician: Vinod Elise MD  Primary Care Provider: [facility]    Isolation Status: No active isolations  Assessment/Plan:      ID  * Subacute osteomyelitis of left foot  Saji Castañeda is a 73 y.o. male with a PMHx of chronic LLE osteomyelitis, T2DM, HTN, CKD, and CAD, who presents after being sent from [facility] who he follows with outpatient who noted worsening LLE wound and new RLE. Patient reports increasing pain and drainage from the area for the past week. No new numbness, swelling, or weakness. He denies any fevers, chills, nausea, vomiting, or malaise. He was admitted to [facility] for evaluation of worsening wounds.    Patient placed on vanc, cefepime and flagyl - MRI showed no OM of the right ankle but this lesion probed to bone. MRI showed OM of the left heel - this was debridede and cultures pending.  Blood now growing 1/2 bottles of a CNS - staph pasteur.    1. Keep on vanc, cefepime and flagyl  - awaits wound cultures   2. Staph pasteur is a CNS but not part of the skin bossman - for now keep on current abx - will treat this specifically as well - await sensitivities     3. ID will order repeat BC x 2 for the AM draw     Anticipated Disposition:     Thank you for your consult. I will follow-up with patient. Please contact us if you have any additional questions.    David Rosa MD  Infectious Disease  [facility]    Subjective:     Principal Problem:Subacute osteomyelitis of left foot    HPI: Saji Castañeda is a 73 y.o. male with a PMHx of chronic LLE osteomyelitis, T2DM, HTN, CKD, and CAD, who presents after being sent from [facility] who he follows with outpatient who noted worsening LLE wound and new RLE. Patient reports increasing pain and drainage from the area for the past week. No new numbness,  swelling, or weakness. He denies any fevers, chills, nausea, vomiting, or malaise. He was admitted to [facility] for evaluation of worsening wounds.    Patient placed on vanc, cefepime and flagyl - MRI showed no OM of the right ankle but this lesion probed to bone. MRI showed OM of the left heel - this was debridede and cultures pending.  Blood now growing 1/2 bottles of a CNS - staph pasteur.         Interval History: Doing OK today - no fevers or chills - some pain in the feet but otherwise no complaints    Review of Systems   Constitutional:  Positive for activity change. Negative for chills, fatigue and fever.   HENT: Negative.     Eyes: Negative.    Respiratory: Negative.     Cardiovascular: Negative.    Gastrointestinal: Negative.    Endocrine: Negative.    Genitourinary: Negative.    Musculoskeletal: Negative.    Skin:  Positive for wound.     Objective:     Vital Signs (Most Recent):  Temp: 96.2 °F (35.7 °C) (03/12/23 0011)  Pulse: (!) 59 (03/12/23 0011)  Resp: 18 (03/12/23 0011)  BP: (!) 143/65 (03/12/23 0011)  SpO2: (!) 92 % (03/12/23 0011)   Vital Signs (24h Range):  Temp:  [96.2 °F (35.7 °C)-100 °F (37.8 °C)] 96.2 °F (35.7 °C)  Pulse:  [53-87] 59  Resp:  [18-20] 18  SpO2:  [92 %-94 %] 92 %  BP: (140-174)/(63-74) 143/65     Weight: 122.9 kg (271 lb)  Body mass index is 40.02 kg/m².    Estimated Creatinine Clearance: 60 mL/min (based on SCr of 1.4 mg/dL).    Physical Exam  Constitutional:       Appearance: Normal appearance.   HENT:      Head: Atraumatic.   Cardiovascular:      Rate and Rhythm: Normal rate and regular rhythm.      Pulses: Normal pulses.      Heart sounds: Normal heart sounds.   Pulmonary:      Effort: Pulmonary effort is normal.      Breath sounds: Normal breath sounds.   Abdominal:      General: Abdomen is flat.      Palpations: Abdomen is soft.   Musculoskeletal:      Cervical back: Normal range of motion and neck supple.   Skin:     Findings: Lesion present.      Comments: Right ankle -  open wound probes to bone with purulent discharge  Left heel chronic changes noted   Neurological:      Mental Status: He is alert.       Significant Labs: All pertinent labs within the past 24 hours have been reviewed.    Significant Imaging: I have reviewed all pertinent imaging results/findings within the past 24 hours.

## 2023-03-12 NOTE — PROGRESS NOTES
"Surgery follow up  BP (!) 151/67 (BP Location: Left arm, Patient Position: Lying)   Pulse (!) 52   Temp 99.3 °F (37.4 °C) (Oral)   Resp 18   Ht 5' 9" (1.753 m)   Wt 122.9 kg (271 lb)   SpO2 95%   BMI 40.02 kg/m²   I/O last 3 completed shifts:  In: 440 [P.O.:440]  Out: 1075 [Urine:1075]  No intake/output data recorded.    Dressing changed wound ok   Awaiting culture report    Does not want to hear about JEANNE   Continue present treatment.    "

## 2023-03-12 NOTE — PROGRESS NOTES
"[facility] Medicine Progress Note    Primary Team: [facility] Hospitalist Team B  Attending Physician: Vinod Elise MD  Resident: Bethany Ruiz MD  Intern: Vi Bains MD (Nak)    Hospital Day: 1 days    Chief Complaint: Chronic BLE wounds w/ superimposed cellulitis worsening over 3wks    Subjective:   Patient seen and examined by me this morning. Patient seen and examined by me this morning. Patient reports that today his pain is less and he feels much better. Pt denies fevers, chills, N/V, SOB, chest pain, abd pain, or numbness/weakness.  Pt remain afebrile w/ stable vitals on room air and no leukocytosis. Pending repeat blood culture.     Review of Systems   All other systems reviewed and are negative.      Objective:   Last 24 Hour Vital Signs:  BP  Min: 120/57  Max: 167/71  Temp  Av.9 °F (36.6 °C)  Min: 96.2 °F (35.7 °C)  Max: 100 °F (37.8 °C)  Pulse  Av.3  Min: 49  Max: 87  Resp  Av.9  Min: 17  Max: 20  SpO2  Av.3 %  Min: 92 %  Max: 96 %  Height  Av' 9" (175.3 cm)  Min: 5' 9" (175.3 cm)  Max: 5' 9" (175.3 cm)  Weight  Av.9 kg (271 lb)  Min: 122.9 kg (271 lb)  Max: 122.9 kg (271 lb)    Intake/Output Summary (Last 24 hours) at 3/12/2023 0842  Last data filed at 3/12/2023 0537  Gross per 24 hour   Intake 240 ml   Output 300 ml   Net -60 ml          Physical Examination:  Physical Exam     Physical Exam   Constitutional: He is oriented to person, place, and time. normal appearance. He appears obese.  Non-toxic appearance. No distress. He does not appear ill.   HENT:   Mouth/Throat: Mucous membranes are moist. Oropharynx is clear.   Eyes: Pupils are equal, round, and reactive to light.   Cardiovascular: Normal rate, regular rhythm, normal heart sounds and normal pulses.  Pulmonary:     Effort: Pulmonary effort is normal.      Breath sounds: Normal breath sounds.   Abdominal: Soft. Normal appearance and bowel sounds are normal.   Musculoskeletal:         General: Swelling and " deformity present.      Comments: See media tab on 3/9/23   Neurological: He is alert and oriented to person, place, and time.   Skin: Skin is warm and dry.   See media tab on 3/9/23   Psychiatric: His behavior is normal.      Laboratory:  Recent Labs   Lab 03/09/23  1618 03/10/23  0608 03/11/23  0536 03/12/23  0647   WBC  --  8.48 8.92 8.91   HGB  --  10.1* 8.4* 8.7*   HCT  --  33.1* 26.4* 27.5*   PLT  --  416 385 409   MCV  --  86 83 83   RDW  --  15.7* 15.5* 15.7*    138 134* 134*   K 3.8 4.8 4.2 3.9    102 100 99   CO2 25 25 28 25   BUN 18 16 17 23   CREATININE 1.4 1.3 1.4 1.7*    181* 220* 167*   PROT 8.4  --   --   --    ALBUMIN 2.4*  --   --   --    BILITOT 0.4  --   --   --    AST 32  --   --   --    ALKPHOS 71  --   --   --    ALT 28  --   --   --        Microbiology Results (last 7 days)       Procedure Component Value Units Date/Time    Blood culture [[id number]] Collected: 03/12/23 0647    Order Status: Sent Specimen: Blood from Antecubital, Left Updated: 03/12/23 0648    Blood culture [[id number]] Collected: 03/12/23 0648    Order Status: Sent Specimen: Blood Updated: 03/12/23 0648    Blood culture #2 **CANNOT BE ORDERED STAT** [[id number]] Collected: 03/10/23 0608    Order Status: Completed Specimen: Blood Updated: 03/11/23 1612     Blood Culture, Routine No Growth to date      No Growth to date    Blood culture #1 **CANNOT BE ORDERED STAT** [[id number]]  (Abnormal) Collected: 03/09/23 1525    Order Status: Completed Specimen: Blood from Peripheral, Hand, Right Updated: 03/11/23 1308     Blood Culture, Routine Gram stain aer bottle: Gram positive cocci in clusters resembling Staph      Results called to and read back by:Hmalet Mullen RN 03/10/2023  20:36      STAPHYLOCOCCUS SPECIES  Susceptibility pending  Further identified as Staphylococcus pasteuri      Aerobic culture [[id number]] Collected: 03/10/23 9284    Order Status: Sent Specimen: Wound from Leg, Left Updated: 03/10/23 2851     Culture, Anaerobe [[id number]] Collected: 03/10/23 1741    Order Status: Sent Specimen: Wound from Leg, Left Updated: 03/10/23 2255    MRSA/SA Rapid ID by PCR from Blood culture [[id number]] Collected: 03/09/23 1525    Order Status: Completed Updated: 03/10/23 2214     Staph aureus ID by PCR Negative     MRSA ID by PCR Negative    Aerobic culture [[id number]] Collected: 03/10/23 1741    Order Status: Canceled Specimen: Wound from Leg, Left     Culture, Anaerobe [[id number]] Collected: 03/10/23 1741    Order Status: Canceled Specimen: Wound from Leg, Left     Aerobic culture [[id number]] Collected: 03/10/23 1741    Order Status: Canceled Specimen: Wound from Leg, Left           I have personally reviewed the above laboratory studies.     Imaging:  EKG (my interpretation): no indication for EKG this admit at this time    X-Ray Ankle Complete Right    Result Date: 3/9/2023  EXAMINATION: XR ANKLE COMPLETE 3 VIEW RIGHT CLINICAL HISTORY: Unspecified open wound, unspecified lower leg, initial encounter TECHNIQUE: AP, lateral, and oblique images of the right ankle were performed. COMPARISON: Right foot series 02/23/2022 FINDINGS: Chronic appearing deformity of the mid to distal femoral shaft presumably sequela of remote trauma.  No acute displaced fracture, dislocation or destructive osseous process.  There is DJD. Nonspecific soft tissue swelling about the distal right lower leg, ankle and dorsal hindfoot particular along the anterior and lateral aspect likely representing cellulitis.  No large soft tissue defect or subcutaneous emphysema seen.  Scattered atherosclerotic vascular and also nonspecific soft tissue calcifications noted.  No radiodense retained foreign body.  Small enthesophyte at the Achilles insertion site.     Distal right lower leg, ankle and hindfoot diffuse nonspecific soft tissue swelling from edema or cellulitis with superimposed more prominent soft tissue swelling along the anterolateral aspect of the  distal lower leg.  Clinical correlation advised. Additional chronic appearing findings as above. Electronically signed by: Juan Hernandez MD Date:    03/09/2023 Time:    16:51    MRI Ankle Without Contrast Right    Result Date: 3/9/2023  EXAMINATION: MRI ANKLE WITHOUT CONTRAST RIGHT CLINICAL HISTORY: Soft tissue infection suspected, ankle, xray done; TECHNIQUE: MRI ankle performed per routine protocol without contrast. COMPARISON: MRI right lower extremity from 09/01/2006.  MRI the contralateral ankle from 02/19/2021.  Radiographs of the right ankle from 03/09/2023 taken at 1543. FINDINGS: Bone: There is no evidence of acute osteomyelitis.  Marrow signal is normal.  No marrow edema or T1 hypointense signal. Articulations: There is cartilage loss of the joint between the navicular and the cuneiform bones with adjacent endplate edema and cystic change in the distal pole of the navicular.  Remaining articulations are grossly unremarkable.  There is no evidence of a large joint effusion. Tendons: Flexor, extensor, and peroneal tendons are grossly intact and demonstrate normal signal.  There is no evidence of tenosynovitis.  The Achilles tendon is intact. Ligaments: The anterior inferior and posteroinferior tibiofibular ligaments are intact.  The anterior talofibular ligament is intact.  The calcaneal fibular and posterior talofibular ligaments are intact.  The deltoid and spring ligaments are intact.  The Lisfranc ligament is not entirely included in the field of view. Soft tissues: There is a large dorsal soft tissue ulcer with exposure of the extensor hallucis tendon.  There is circumferential soft tissue edema the ankle extending into the dorsum the hindfoot.     1. No acute osteomyelitis. 2. Large soft tissue ulcer of the dorsal aspect of the hindfoot and ankle with exposure of the extensor hallucis tendon.  Surrounding soft tissue edema and cellulitis as above. Electronically signed by: Zane Galeana  Date:    03/09/2023 Time:    19:45       Current Medications:     Scheduled:   apixaban  5 mg Oral BID    ascorbic acid (vitamin C)  500 mg Oral Daily    aspirin  81 mg Oral Daily    atorvastatin  80 mg Oral Daily    ceFEPime (MAXIPIME) IVPB  2 g Intravenous Q12H    clopidogreL  75 mg Oral Daily    furosemide  40 mg Oral Q48H    gabapentin  200 mg Oral BID    hydrALAZINE  25 mg Oral TID    hydroCHLOROthiazide  25 mg Oral Daily    insulin aspart U-100  20 Units Subcutaneous TID WM    insulin detemir U-100  35 Units Subcutaneous QHS    isosorbide dinitrate  10 mg Oral TID    losartan  100 mg Oral Daily    metroNIDAZOLE  500 mg Oral Q8H    NIFEdipine  60 mg Oral Daily    pantoprazole  40 mg Oral Daily    tamsulosin  0.4 mg Oral Daily    vancomycin (VANCOCIN) IVPB  1,250 mg Intravenous Q12H         Infusions:       PRN:  sodium chloride 0.9%, dextrose 10%, dextrose 10%, dextrose, dextrose, glucagon (human recombinant), insulin aspart U-100, naloxone, sodium chloride 0.9%, Pharmacy to dose Vancomycin consult **AND** vancomycin - pharmacy to dose    Antibiotics and Day Number of Therapy:  Antibiotics (From admission, onward)      Start     Stop Route Frequency Ordered    03/11/23 0900  vancomycin 1,250 mg in dextrose 5 % (D5W) 250 mL IVPB (Vial-Mate)         -- IV Every 12 hours (non-standard times) 03/10/23 1655    03/10/23 2200  metroNIDAZOLE tablet 500 mg         -- Oral Every 8 hours 03/10/23 1627    03/10/23 1730  ceFEPIme (MAXIPIME) 2 g in dextrose 5 % in water (D5W) 5 % 50 mL IVPB (MB+)         -- IV Every 12 hours (non-standard times) 03/10/23 1627    03/10/23 1725  vancomycin - pharmacy to dose  (vancomycin IVPB)        See Ham for full Linked Orders Report.    -- IV pharmacy to manage frequency 03/10/23 1627          Assessment:     Saji Castañeda is a 73 y.o. male with a PMHx of chronic LLE osteomyelitis,T2DM c/b neuropathy, PVD, DVT in RUE (June 2022), HTN, CKD, Mobitz Type 1, NSTEMI (9/2022) presenting  with worsening BLE wounds that has been getting worse over the past 3 wks. Pt will be admitted to [facility] for the evaluation and management of chronic BLE wounds w/ superimposed cellulitis    Plan:     Chronic BLE wounds w/ superimposed cellulitis  Per Wound Care, pt has new R anterior ankle wounds w/ tendon exposure & a known L plantar foot wound w/ palpable bone in L heel from previous visit. Pt sent to ED for possible IV abx +/- debridement.  Afebrile w/ no elevations in WBC and ANC, however cannot r/o systemic infxn given pt's poor immune status 2/2 long T2DM hx  1st B-Cx NGTD; unable to obtain 2nd B-Cx due to difficulty obtaining and ultimately pt refusal  Vanc IV  Consulted ID for abx management/regiment given hx of receiving dapto. per ID:  Pending; appreciate recs  MRI R Ankle showed no acute osteomyelitis but showed large soft tissue ulcer of the dorsal aspect of the hindfoot and ankle with exposure of the extensor hallucis tendon w/ surrounding soft tissue edema and cellulitis.   BLE U/S showed no evidence of vasc occlusion/stenosis in RLE/LLE arterial system, but does display findings suggestive of vasc ds   MRI L Foot showed Changes of osteomyelitis involving the posterior calcaneus where there is a large soft tissue defect.  It is similar to 02/19/2021 examination.  There is edema involving the distal Achilles tendon near its insertion onto the calcaneus as well.  Consulted Podiatry for L heel osteo eval; per Podiatry:  Pending; appreciate recs (Excisional biopsy done 3/10/23 afternoon per Dr Orozco)  - Blood culture positive for staph pasteuri  -Ordered TTE 3/11;       The left ventricle is mildly enlarged with concentric hypertrophy and normal systolic function.  The estimated ejection fraction is 70%.  Normal left ventricular diastolic function.  Normal right ventricular size with normal right ventricular systolic function.  Severe left atrial enlargement.  Intermediate central venous pressure (8  mmHg).  The estimated PA systolic pressure is 36 mmHg.  Small pericardial effusion.  Valves not well visualized.  -Tissue culture form left leg: pending  -Surgical consulted, local wound care may need BKA  -ID consulted and recommended continue on vanc, cefepime and flagyl  - awaits wound cultures. Staph pasteur is a CNS but not part of the skin bossman - for now keep on current abx - will treat this specifically as well - await sensitivities   -3/12 Repeat BC x 2; pending     T2DM w/ Neuropathy  Last A1c 9.9 about 5 months ago  Home Metformin and Rosuvastatin w/ insulin  POCT glucose checks  A1c 8.4  Currently on Atorvastatin and SSI  Continue home Gabapentin for neuropathy  Continue home Lasix and Tamsulosin for hx inability to empty bladder 2/2 neuropathy     PAD w/ hx of DVT in RUE  DVT in RUE in June 2022  DAPT  Continue Eliquis     HTN  Continue home hydralazine, isodil, losartan, and nifedipine.     Hx of Type 1 Mobitz  Avoid BB  Unremarkable ROS and PE suggestive of cardiac process at this time  CTM     Hx of NSTEMI  Hx of NSTEMI in 9/2022  TTE during NSTEMI work-up showed LVEF ~60% w/ notable Pulm HTN (PA systolic pressure 41mmHg)  Unremarkable ROS and PE suggestive of cardiac process at this time  CTM     Ppx: Apixaban  Diet: Diabetic  Code: Full     Disposition: pending resolution/improvement of worsening BLE wound infxn.    Jayesh Hoang MD  [facility] Internal Medicine, PGY-1    [facility] Medicine Hospitalist Pager numbers:   [facility] Hospitalist Medicine Team A (Lorne/Riki): [phone]  [facility] Hospitalist Medicine Team B (Arik/Lucio):  [phone]

## 2023-03-12 NOTE — PROGRESS NOTES
[facility]  Infectious Disease  Progress Note    Patient Name: Saji Castañeda  MRN: [id number]  Admission Date: 3/9/2023  Length of Stay: 1 days  Attending Physician: Vinod Elise MD  Primary Care Provider: [facility]    Isolation Status: No active isolations  Assessment/Plan:      ID  * Subacute osteomyelitis of left foot  Saji Castañeda is a 73 y.o. male with a PMHx of chronic LLE osteomyelitis, T2DM, HTN, CKD, and CAD, who presents after being sent from [facility] who he follows with outpatient who noted worsening LLE wound and new RLE. Patient reports increasing pain and drainage from the area for the past week. No new numbness, swelling, or weakness. He denies any fevers, chills, nausea, vomiting, or malaise. He was admitted to [facility] for evaluation of worsening wounds.    Patient placed on vanc, cefepime and flagyl - MRI showed no OM of the right ankle but this lesion probed to bone. MRI showed OM of the left heel - this was debridede and cultures pending.  Blood now growing 1/2 bottles of a CNS - staph pasteur.    1. Keep on vanc, cefepime and flagyl  - awaits wound cultures  - now growing proteus - sensitivity to follow   2. Staph pasteur is a CNS but not part of the skin bossman - for now keep on current abx - will treat this specifically as well - await sensitivities   Repeat BC NGTD        Anticipated Disposition:       Thank you for your consult. I will follow-up with patient. Please contact us if you have any additional questions.    David Rosa MD  Infectious Disease  [facility]    Subjective:     Principal Problem:Subacute osteomyelitis of left foot    HPI: Saji Castañeda is a 73 y.o. male with a PMHx of chronic LLE osteomyelitis, T2DM, HTN, CKD, and CAD, who presents after being sent from [facility] who he follows with outpatient who noted worsening LLE wound and new RLE. Patient reports increasing pain and drainage from the area for the past week. No  new numbness, swelling, or weakness. He denies any fevers, chills, nausea, vomiting, or malaise. He was admitted to [facility] for evaluation of worsening wounds.    Patient placed on vanc, cefepime and flagyl - MRI showed no OM of the right ankle but this lesion probed to bone. MRI showed OM of the left heel - this was debridede and cultures pending.  Blood now growing 1/2 bottles of a CNS - staph pasteur.         Interval History: Doing well today - less pain in the feet - no new issues     Review of Systems    Constitutional:  Positive for activity change. Negative for chills, fatigue and fever.   HENT: Negative.     Eyes: Negative.    Respiratory: Negative.     Cardiovascular: Negative.    Gastrointestinal: Negative.    Endocrine: Negative.    Genitourinary: Negative.    Musculoskeletal: Negative.    Skin:  Positive for wound.   Objective:     Vital Signs (Most Recent):  Temp: 99.3 °F (37.4 °C) (03/12/23 1140)  Pulse: (!) 52 (03/12/23 1140)  Resp: 18 (03/12/23 1140)  BP: (!) 151/67 (03/12/23 1140)  SpO2: 95 % (03/12/23 1140)   Vital Signs (24h Range):  Temp:  [96.2 °F (35.7 °C)-100 °F (37.8 °C)] 99.3 °F (37.4 °C)  Pulse:  [49-63] 52  Resp:  [17-20] 18  SpO2:  [92 %-96 %] 95 %  BP: (120-155)/(57-67) 151/67     Weight: 122.9 kg (271 lb)  Body mass index is 40.02 kg/m².    Estimated Creatinine Clearance: 49.4 mL/min (A) (based on SCr of 1.7 mg/dL (H)).    Physical Exam    Constitutional:       Appearance: Normal appearance.   HENT:      Head: Atraumatic.   Cardiovascular:      Rate and Rhythm: Normal rate and regular rhythm.      Pulses: Normal pulses.      Heart sounds: Normal heart sounds.   Pulmonary:      Effort: Pulmonary effort is normal.      Breath sounds: Normal breath sounds.   Abdominal:      General: Abdomen is flat.      Palpations: Abdomen is soft.   Musculoskeletal:      Cervical back: Normal range of motion and neck supple.   Skin:     Findings: Lesion present.      Comments: Right ankle - open wound  probes to bone with purulent discharge  Left heel chronic changes noted   Neurological:      Mental Status: He is alert.   Significant Labs: All pertinent labs within the past 24 hours have been reviewed.    Significant Imaging: I have reviewed all pertinent imaging results/findings within the past 24 hours.

## 2023-03-12 NOTE — PROGRESS NOTES
Pharmacokinetic Assessment Follow Up: IV Vancomycin    Vancomycin serum concentration assessment(s):    The trough level was drawn correctly and can be used to guide therapy at this time. The measurement is above the desired definitive target range of 15 to 20 mcg/mL.    Vancomycin Regimen Plan:    Re-dose when the random level is less than 20 mcg/mL, next level to be drawn at 04:00 on 3/13/2023    Drug levels (last 3 results):  Recent Labs   Lab Result Units 03/12/23  0954   Vancomycin-Trough ug/mL 28.0*       Pharmacy will continue to follow and monitor vancomycin.    Please contact pharmacy at extension [phone] for questions regarding this assessment.    Thank you for the consult,   Flory Thompson       Patient brief summary:  Saji Castañeda is a 73 y.o. male initiated on antimicrobial therapy with IV Vancomycin for treatment of bone/joint infection    The patient's current regimen is vancomycin 1250 mg every 12 hours.    Drug Allergies:   Review of patient's allergies indicates:  No Known Allergies    Actual Body Weight:   122.9 kg    Renal Function:   Estimated Creatinine Clearance: 49.4 mL/min (A) (based on SCr of 1.7 mg/dL (H)).,     Dialysis Method (if applicable):  N/A    CBC (last 72 hours):  Recent Labs   Lab Result Units 03/09/23  1531 03/10/23  0608 03/11/23  0536 03/12/23  0647   WBC K/uL 10.86 8.48 8.92 8.91   Hemoglobin g/dL 9.4* 10.1* 8.4* 8.7*   Hemoglobin A1C %  --  8.4*  --   --    Hematocrit % 29.7* 33.1* 26.4* 27.5*   Platelets K/uL 374 416 385 409   Gran % % 71.6 71.8 75.8* 75.6*   Lymph % % 20.0 17.0* 11.3* 11.6*   Mono % % 6.2 7.8 10.0 8.5   Eosinophil % % 1.4 2.4 2.4 3.6   Basophil % % 0.3 0.6 0.3 0.3   Differential Method  Automated Automated Automated Automated       Metabolic Panel (last 72 hours):  Recent Labs   Lab Result Units 03/09/23  1618 03/10/23  0608 03/11/23  0536 03/12/23  0647   Sodium mmol/L 138 138 134* 134*   Potassium mmol/L 3.8 4.8 4.2 3.9   Chloride mmol/L 101 102 100 99    CO2 mmol/L 25 25 28 25   Glucose mg/dL 107 181* 220* 167*   BUN mg/dL 18 16 17 23   Creatinine mg/dL 1.4 1.3 1.4 1.7*   Albumin g/dL 2.4*  --   --   --    Total Bilirubin mg/dL 0.4  --   --   --    Alkaline Phosphatase U/L 71  --   --   --    AST U/L 32  --   --   --    ALT U/L 28  --   --   --    Magnesium mg/dL  --  2.0 2.1 2.1   Phosphorus mg/dL  --  3.2 2.7 3.3       Vancomycin Administrations:  vancomycin given in the last 96 hours                     vancomycin 1,250 mg in dextrose 5 % (D5W) 250 mL IVPB (Vial-Mate) (mg) 1,250 mg New Bag 03/11/23 2302     1,250 mg New Bag  1058    vancomycin (VANCOCIN) 2,500 mg in dextrose 5 % (D5W) 500 mL IVPB (mg) 2,500 mg New Bag 03/10/23 2230    vancomycin (VANCOCIN) 2,500 mg in dextrose 5 % (D5W) 500 mL IVPB (mg) 2,500 mg New Bag 03/09/23 1948                    Microbiologic Results:  Microbiology Results (last 7 days)       Procedure Component Value Units Date/Time    Aerobic culture [[id number]]  (Abnormal) Collected: 03/10/23 1741    Order Status: Completed Specimen: Wound from Leg, Left Updated: 03/12/23 0938     Aerobic Bacterial Culture PRESUMPTIVE PROTEUS SPECIES  Few  Identification and susceptibility pending      Narrative:      Left heel wound    Blood culture #1 **CANNOT BE ORDERED STAT** [[id number]]  (Abnormal) Collected: 03/09/23 1525    Order Status: Completed Specimen: Blood from Peripheral, Hand, Right Updated: 03/12/23 0907     Blood Culture, Routine Gram stain aer bottle: Gram positive cocci in clusters resembling Staph      Results called to and read back by:Hamlet Mullen RN 03/10/2023  20:36      STAPHYLOCOCCUS SPECIES  Susceptibility pending  Further identified as Staphylococcus pasteuri      Blood culture [[id number]] Collected: 03/12/23 0647    Order Status: Sent Specimen: Blood from Antecubital, Left Updated: 03/12/23 0648    Blood culture [[id number]] Collected: 03/12/23 0648    Order Status: Sent Specimen: Blood Updated: 03/12/23 0648    Blood  culture #2 **CANNOT BE ORDERED STAT** [[id number]] Collected: 03/10/23 0608    Order Status: Completed Specimen: Blood Updated: 03/11/23 1612     Blood Culture, Routine No Growth to date      No Growth to date    Culture, Anaerobe [[id number]] Collected: 03/10/23 1741    Order Status: Sent Specimen: Wound from Leg, Left Updated: 03/10/23 2255    MRSA/SA Rapid ID by PCR from Blood culture [[id number]] Collected: 03/09/23 1525    Order Status: Completed Updated: 03/10/23 2214     Staph aureus ID by PCR Negative     MRSA ID by PCR Negative    Aerobic culture [[id number]] Collected: 03/10/23 1741    Order Status: Canceled Specimen: Wound from Leg, Left     Culture, Anaerobe [[id number]] Collected: 03/10/23 1741    Order Status: Canceled Specimen: Wound from Leg, Left     Aerobic culture [[id number]] Collected: 03/10/23 1741    Order Status: Canceled Specimen: Wound from Leg, Left

## 2023-03-12 NOTE — SUBJECTIVE & OBJECTIVE
Interval History: Doing OK today - no fevers or chills - some pain in the feet but otherwise no complaints    Review of Systems   Constitutional:  Positive for activity change. Negative for chills, fatigue and fever.   HENT: Negative.     Eyes: Negative.    Respiratory: Negative.     Cardiovascular: Negative.    Gastrointestinal: Negative.    Endocrine: Negative.    Genitourinary: Negative.    Musculoskeletal: Negative.    Skin:  Positive for wound.     Objective:     Vital Signs (Most Recent):  Temp: 96.2 °F (35.7 °C) (03/12/23 0011)  Pulse: (!) 59 (03/12/23 0011)  Resp: 18 (03/12/23 0011)  BP: (!) 143/65 (03/12/23 0011)  SpO2: (!) 92 % (03/12/23 0011)   Vital Signs (24h Range):  Temp:  [96.2 °F (35.7 °C)-100 °F (37.8 °C)] 96.2 °F (35.7 °C)  Pulse:  [53-87] 59  Resp:  [18-20] 18  SpO2:  [92 %-94 %] 92 %  BP: (140-174)/(63-74) 143/65     Weight: 122.9 kg (271 lb)  Body mass index is 40.02 kg/m².    Estimated Creatinine Clearance: 60 mL/min (based on SCr of 1.4 mg/dL).    Physical Exam  Constitutional:       Appearance: Normal appearance.   HENT:      Head: Atraumatic.   Cardiovascular:      Rate and Rhythm: Normal rate and regular rhythm.      Pulses: Normal pulses.      Heart sounds: Normal heart sounds.   Pulmonary:      Effort: Pulmonary effort is normal.      Breath sounds: Normal breath sounds.   Abdominal:      General: Abdomen is flat.      Palpations: Abdomen is soft.   Musculoskeletal:      Cervical back: Normal range of motion and neck supple.   Skin:     Findings: Lesion present.      Comments: Right ankle - open wound probes to bone with purulent discharge  Left heel chronic changes noted   Neurological:      Mental Status: He is alert.       Significant Labs: All pertinent labs within the past 24 hours have been reviewed.    Significant Imaging: I have reviewed all pertinent imaging results/findings within the past 24 hours.

## 2023-03-12 NOTE — ASSESSMENT & PLAN NOTE
Saji Castañeda is a 73 y.o. male with a PMHx of chronic LLE osteomyelitis, T2DM, HTN, CKD, and CAD, who presents after being sent from [facility] who he follows with outpatient who noted worsening LLE wound and new RLE. Patient reports increasing pain and drainage from the area for the past week. No new numbness, swelling, or weakness. He denies any fevers, chills, nausea, vomiting, or malaise. He was admitted to [facility] for evaluation of worsening wounds.    Patient placed on vanc, cefepime and flagyl - MRI showed no OM of the right ankle but this lesion probed to bone. MRI showed OM of the left heel - this was debridede and cultures pending.  Blood now growing 1/2 bottles of a CNS - staph pasteur.    1. Keep on vanc, cefepime and flagyl  - awaits wound cultures   2. Staph pasteur is a CNS but not part of the skin bossman - for now keep on current abx - will treat this specifically as well - await sensitivities

## 2023-03-12 NOTE — HPI
Saji Castañeda is a 73 y.o. male with a PMHx of chronic LLE osteomyelitis, T2DM, HTN, CKD, and CAD, who presents after being sent from [facility] who he follows with outpatient who noted worsening LLE wound and new RLE. Patient reports increasing pain and drainage from the area for the past week. No new numbness, swelling, or weakness. He denies any fevers, chills, nausea, vomiting, or malaise. He was admitted to [facility] for evaluation of worsening wounds.    Patient placed on vanc, cefepime and flagyl - MRI showed no OM of the right ankle but this lesion probed to bone. MRI showed OM of the left heel - this was debridede and cultures pending.  Blood now growing 1/2 bottles of a CNS - staph pasteur.

## 2023-03-12 NOTE — SUBJECTIVE & OBJECTIVE
Interval History: Doing well today - less pain in the feet - no new issues     Review of Systems    Constitutional:  Positive for activity change. Negative for chills, fatigue and fever.   HENT: Negative.     Eyes: Negative.    Respiratory: Negative.     Cardiovascular: Negative.    Gastrointestinal: Negative.    Endocrine: Negative.    Genitourinary: Negative.    Musculoskeletal: Negative.    Skin:  Positive for wound.   Objective:     Vital Signs (Most Recent):  Temp: 99.3 °F (37.4 °C) (03/12/23 1140)  Pulse: (!) 52 (03/12/23 1140)  Resp: 18 (03/12/23 1140)  BP: (!) 151/67 (03/12/23 1140)  SpO2: 95 % (03/12/23 1140)   Vital Signs (24h Range):  Temp:  [96.2 °F (35.7 °C)-100 °F (37.8 °C)] 99.3 °F (37.4 °C)  Pulse:  [49-63] 52  Resp:  [17-20] 18  SpO2:  [92 %-96 %] 95 %  BP: (120-155)/(57-67) 151/67     Weight: 122.9 kg (271 lb)  Body mass index is 40.02 kg/m².    Estimated Creatinine Clearance: 49.4 mL/min (A) (based on SCr of 1.7 mg/dL (H)).    Physical Exam    Constitutional:       Appearance: Normal appearance.   HENT:      Head: Atraumatic.   Cardiovascular:      Rate and Rhythm: Normal rate and regular rhythm.      Pulses: Normal pulses.      Heart sounds: Normal heart sounds.   Pulmonary:      Effort: Pulmonary effort is normal.      Breath sounds: Normal breath sounds.   Abdominal:      General: Abdomen is flat.      Palpations: Abdomen is soft.   Musculoskeletal:      Cervical back: Normal range of motion and neck supple.   Skin:     Findings: Lesion present.      Comments: Right ankle - open wound probes to bone with purulent discharge  Left heel chronic changes noted   Neurological:      Mental Status: He is alert.   Significant Labs: All pertinent labs within the past 24 hours have been reviewed.    Significant Imaging: I have reviewed all pertinent imaging results/findings within the past 24 hours.

## 2023-03-13 LAB
ALBUMIN SERPL BCP-MCNC: 1.9 G/DL (ref 3.5–5.2)
ALP SERPL-CCNC: 54 U/L (ref 55–135)
ALT SERPL W/O P-5'-P-CCNC: 18 U/L (ref 10–44)
ANION GAP SERPL CALC-SCNC: 10 MMOL/L (ref 8–16)
AST SERPL-CCNC: 22 U/L (ref 10–40)
BACTERIA BLD CULT: ABNORMAL
BACTERIA SPEC AEROBE CULT: ABNORMAL
BASOPHILS # BLD AUTO: 0.04 K/UL (ref 0–0.2)
BASOPHILS NFR BLD: 0.5 % (ref 0–1.9)
BILIRUB SERPL-MCNC: 0.2 MG/DL (ref 0.1–1)
BUN SERPL-MCNC: 25 MG/DL (ref 8–23)
CALCIUM SERPL-MCNC: 8.5 MG/DL (ref 8.7–10.5)
CHLORIDE SERPL-SCNC: 100 MMOL/L (ref 95–110)
CO2 SERPL-SCNC: 25 MMOL/L (ref 23–29)
CREAT SERPL-MCNC: 1.7 MG/DL (ref 0.5–1.4)
DIFFERENTIAL METHOD: ABNORMAL
EOSINOPHIL # BLD AUTO: 0.4 K/UL (ref 0–0.5)
EOSINOPHIL NFR BLD: 4.7 % (ref 0–8)
ERYTHROCYTE [DISTWIDTH] IN BLOOD BY AUTOMATED COUNT: 15.7 % (ref 11.5–14.5)
EST. GFR  (NO RACE VARIABLE): 42 ML/MIN/1.73 M^2
GLUCOSE SERPL-MCNC: 172 MG/DL (ref 70–110)
HCT VFR BLD AUTO: 25.6 % (ref 40–54)
HGB BLD-MCNC: 8.1 G/DL (ref 14–18)
IMM GRANULOCYTES # BLD AUTO: 0.05 K/UL (ref 0–0.04)
IMM GRANULOCYTES NFR BLD AUTO: 0.6 % (ref 0–0.5)
LYMPHOCYTES # BLD AUTO: 0.9 K/UL (ref 1–4.8)
LYMPHOCYTES NFR BLD: 11 % (ref 18–48)
MAGNESIUM SERPL-MCNC: 2.2 MG/DL (ref 1.6–2.6)
MCH RBC QN AUTO: 26.5 PG (ref 27–31)
MCHC RBC AUTO-ENTMCNC: 31.6 G/DL (ref 32–36)
MCV RBC AUTO: 84 FL (ref 82–98)
MONOCYTES # BLD AUTO: 0.8 K/UL (ref 0.3–1)
MONOCYTES NFR BLD: 9.5 % (ref 4–15)
NEUTROPHILS # BLD AUTO: 5.9 K/UL (ref 1.8–7.7)
NEUTROPHILS NFR BLD: 73.7 % (ref 38–73)
NRBC BLD-RTO: 0 /100 WBC
PHOSPHATE SERPL-MCNC: 3.4 MG/DL (ref 2.7–4.5)
PLATELET # BLD AUTO: 393 K/UL (ref 150–450)
PMV BLD AUTO: 9.1 FL (ref 9.2–12.9)
POCT GLUCOSE: 143 MG/DL (ref 70–110)
POCT GLUCOSE: 163 MG/DL (ref 70–110)
POCT GLUCOSE: 170 MG/DL (ref 70–110)
POCT GLUCOSE: 208 MG/DL (ref 70–110)
POTASSIUM SERPL-SCNC: 4 MMOL/L (ref 3.5–5.1)
PROT SERPL-MCNC: 7.3 G/DL (ref 6–8.4)
RBC # BLD AUTO: 3.06 M/UL (ref 4.6–6.2)
SODIUM SERPL-SCNC: 135 MMOL/L (ref 136–145)
VANCOMYCIN SERPL-MCNC: 18.1 UG/ML
WBC # BLD AUTO: 8.03 K/UL (ref 3.9–12.7)

## 2023-03-13 PROCEDURE — 25000003 PHARM REV CODE 250

## 2023-03-13 PROCEDURE — C1751 CATH, INF, PER/CENT/MIDLINE: HCPCS

## 2023-03-13 PROCEDURE — 63600175 PHARM REV CODE 636 W HCPCS: Performed by: INTERNAL MEDICINE

## 2023-03-13 PROCEDURE — 80053 COMPREHEN METABOLIC PANEL: CPT | Performed by: STUDENT IN AN ORGANIZED HEALTH CARE EDUCATION/TRAINING PROGRAM

## 2023-03-13 PROCEDURE — 25000003 PHARM REV CODE 250: Performed by: INTERNAL MEDICINE

## 2023-03-13 PROCEDURE — 11000001 HC ACUTE MED/SURG PRIVATE ROOM

## 2023-03-13 PROCEDURE — 25000003 PHARM REV CODE 250: Performed by: STUDENT IN AN ORGANIZED HEALTH CARE EDUCATION/TRAINING PROGRAM

## 2023-03-13 PROCEDURE — 36415 COLL VENOUS BLD VENIPUNCTURE: CPT | Performed by: INTERNAL MEDICINE

## 2023-03-13 PROCEDURE — 83735 ASSAY OF MAGNESIUM: CPT | Performed by: STUDENT IN AN ORGANIZED HEALTH CARE EDUCATION/TRAINING PROGRAM

## 2023-03-13 PROCEDURE — 63600175 PHARM REV CODE 636 W HCPCS: Performed by: STUDENT IN AN ORGANIZED HEALTH CARE EDUCATION/TRAINING PROGRAM

## 2023-03-13 PROCEDURE — 80202 ASSAY OF VANCOMYCIN: CPT | Performed by: INTERNAL MEDICINE

## 2023-03-13 PROCEDURE — 36569 INSJ PICC 5 YR+ W/O IMAGING: CPT

## 2023-03-13 PROCEDURE — 84100 ASSAY OF PHOSPHORUS: CPT | Performed by: STUDENT IN AN ORGANIZED HEALTH CARE EDUCATION/TRAINING PROGRAM

## 2023-03-13 PROCEDURE — 85025 COMPLETE CBC W/AUTO DIFF WBC: CPT | Performed by: STUDENT IN AN ORGANIZED HEALTH CARE EDUCATION/TRAINING PROGRAM

## 2023-03-13 RX ORDER — MUPIROCIN 20 MG/G
OINTMENT TOPICAL 2 TIMES DAILY
Status: DISPENSED | OUTPATIENT
Start: 2023-03-13 | End: 2023-03-18

## 2023-03-13 RX ORDER — SODIUM CHLORIDE 0.9 % (FLUSH) 0.9 %
10 SYRINGE (ML) INJECTION
Status: DISCONTINUED | OUTPATIENT
Start: 2023-03-13 | End: 2023-03-22 | Stop reason: HOSPADM

## 2023-03-13 RX ORDER — SODIUM CHLORIDE 0.9 % (FLUSH) 0.9 %
10 SYRINGE (ML) INJECTION EVERY 6 HOURS
Status: DISCONTINUED | OUTPATIENT
Start: 2023-03-14 | End: 2023-03-22 | Stop reason: HOSPADM

## 2023-03-13 RX ADMIN — ISOSORBIDE DINITRATE 10 MG: 10 TABLET ORAL at 03:03

## 2023-03-13 RX ADMIN — GABAPENTIN 200 MG: 100 CAPSULE ORAL at 08:03

## 2023-03-13 RX ADMIN — ASPIRIN 81 MG: 81 TABLET, COATED ORAL at 08:03

## 2023-03-13 RX ADMIN — CEFTRIAXONE 1 G: 1 INJECTION, POWDER, FOR SOLUTION INTRAMUSCULAR; INTRAVENOUS at 10:03

## 2023-03-13 RX ADMIN — INSULIN ASPART 2 UNITS: 100 INJECTION, SOLUTION INTRAVENOUS; SUBCUTANEOUS at 09:03

## 2023-03-13 RX ADMIN — ISOSORBIDE DINITRATE 10 MG: 10 TABLET ORAL at 08:03

## 2023-03-13 RX ADMIN — METRONIDAZOLE 500 MG: 500 TABLET ORAL at 09:03

## 2023-03-13 RX ADMIN — CLOPIDOGREL BISULFATE 75 MG: 75 TABLET ORAL at 08:03

## 2023-03-13 RX ADMIN — CEFEPIME 2 G: 2 INJECTION, POWDER, FOR SOLUTION INTRAVENOUS at 05:03

## 2023-03-13 RX ADMIN — INSULIN DETEMIR 35 UNITS: 100 INJECTION, SOLUTION SUBCUTANEOUS at 08:03

## 2023-03-13 RX ADMIN — ATORVASTATIN CALCIUM 80 MG: 40 TABLET, FILM COATED ORAL at 08:03

## 2023-03-13 RX ADMIN — NIFEDIPINE 60 MG: 30 TABLET, FILM COATED, EXTENDED RELEASE ORAL at 08:03

## 2023-03-13 RX ADMIN — APIXABAN 5 MG: 5 TABLET, FILM COATED ORAL at 08:03

## 2023-03-13 RX ADMIN — PANTOPRAZOLE SODIUM 40 MG: 40 TABLET, DELAYED RELEASE ORAL at 08:03

## 2023-03-13 RX ADMIN — SODIUM CHLORIDE, PRESERVATIVE FREE 10 ML: 5 INJECTION INTRAVENOUS at 11:03

## 2023-03-13 RX ADMIN — INSULIN ASPART 1 UNITS: 100 INJECTION, SOLUTION INTRAVENOUS; SUBCUTANEOUS at 08:03

## 2023-03-13 RX ADMIN — METRONIDAZOLE 500 MG: 500 TABLET ORAL at 05:03

## 2023-03-13 RX ADMIN — VANCOMYCIN HYDROCHLORIDE 1500 MG: 1.5 INJECTION, POWDER, LYOPHILIZED, FOR SOLUTION INTRAVENOUS at 11:03

## 2023-03-13 RX ADMIN — LOSARTAN POTASSIUM 100 MG: 50 TABLET, FILM COATED ORAL at 08:03

## 2023-03-13 RX ADMIN — METRONIDAZOLE 500 MG: 500 TABLET ORAL at 01:03

## 2023-03-13 RX ADMIN — INSULIN ASPART 20 UNITS: 100 INJECTION, SOLUTION INTRAVENOUS; SUBCUTANEOUS at 05:03

## 2023-03-13 RX ADMIN — INSULIN ASPART 4 UNITS: 100 INJECTION, SOLUTION INTRAVENOUS; SUBCUTANEOUS at 11:03

## 2023-03-13 RX ADMIN — OXYCODONE HYDROCHLORIDE AND ACETAMINOPHEN 500 MG: 500 TABLET ORAL at 08:03

## 2023-03-13 RX ADMIN — HYDRALAZINE HYDROCHLORIDE 25 MG: 25 TABLET, FILM COATED ORAL at 08:03

## 2023-03-13 RX ADMIN — INSULIN ASPART 20 UNITS: 100 INJECTION, SOLUTION INTRAVENOUS; SUBCUTANEOUS at 11:03

## 2023-03-13 RX ADMIN — TAMSULOSIN HYDROCHLORIDE 0.4 MG: 0.4 CAPSULE ORAL at 08:03

## 2023-03-13 RX ADMIN — HYDRALAZINE HYDROCHLORIDE 25 MG: 25 TABLET, FILM COATED ORAL at 03:03

## 2023-03-13 RX ADMIN — MUPIROCIN: 20 OINTMENT TOPICAL at 08:03

## 2023-03-13 RX ADMIN — HYDROCHLOROTHIAZIDE 25 MG: 25 TABLET ORAL at 08:03

## 2023-03-13 RX ADMIN — INSULIN ASPART 20 UNITS: 100 INJECTION, SOLUTION INTRAVENOUS; SUBCUTANEOUS at 08:03

## 2023-03-13 NOTE — PROGRESS NOTES
"Surgery follow up  BP (!) 151/69 (BP Location: Right arm, Patient Position: Lying)   Pulse (!) 58   Temp 98 °F (36.7 °C) (Oral)   Resp 18   Ht 5' 9" (1.753 m)   Wt 122.9 kg (271 lb)   SpO2 96%   BMI 40.02 kg/m²   I/O last 3 completed shifts:  In: 240 [P.O.:240]  Out: 300 [Urine:300]  I/O this shift:  In: -   Out: 275 [Urine:275]    Recent Results (from the past 336 hour(s))   CBC auto differential    Collection Time: 03/13/23  4:28 AM   Result Value Ref Range    WBC 8.03 3.90 - 12.70 K/uL    Hemoglobin 8.1 (L) 14.0 - 18.0 g/dL    Hematocrit 25.6 (L) 40.0 - 54.0 %    Platelets 393 150 - 450 K/uL   CBC with Automated Differential    Collection Time: 03/12/23  6:47 AM   Result Value Ref Range    WBC 8.91 3.90 - 12.70 K/uL    Hemoglobin 8.7 (L) 14.0 - 18.0 g/dL    Hematocrit 27.5 (L) 40.0 - 54.0 %    Platelets 409 150 - 450 K/uL   CBC with Automated Differential    Collection Time: 03/11/23  5:36 AM   Result Value Ref Range    WBC 8.92 3.90 - 12.70 K/uL    Hemoglobin 8.4 (L) 14.0 - 18.0 g/dL    Hematocrit 26.4 (L) 40.0 - 54.0 %    Platelets 385 150 - 450 K/uL     Recent Results (from the past 336 hour(s))   Basic Metabolic Panel (BMP)    Collection Time: 03/12/23  6:47 AM   Result Value Ref Range    Sodium 134 (L) 136 - 145 mmol/L    Potassium 3.9 3.5 - 5.1 mmol/L    Chloride 99 95 - 110 mmol/L    CO2 25 23 - 29 mmol/L    BUN 23 8 - 23 mg/dL    Creatinine 1.7 (H) 0.5 - 1.4 mg/dL    Calcium 8.6 (L) 8.7 - 10.5 mg/dL    Anion Gap 10 8 - 16 mmol/L   Basic Metabolic Panel (BMP)    Collection Time: 03/11/23  5:36 AM   Result Value Ref Range    Sodium 134 (L) 136 - 145 mmol/L    Potassium 4.2 3.5 - 5.1 mmol/L    Chloride 100 95 - 110 mmol/L    CO2 28 23 - 29 mmol/L    BUN 17 8 - 23 mg/dL    Creatinine 1.4 0.5 - 1.4 mg/dL    Calcium 8.5 (L) 8.7 - 10.5 mg/dL    Anion Gap 6 (L) 8 - 16 mmol/L   Basic Metabolic Panel (BMP)    Collection Time: 03/10/23  6:08 AM   Result Value Ref Range    Sodium 138 136 - 145 mmol/L    " Potassium 4.8 3.5 - 5.1 mmol/L    Chloride 102 95 - 110 mmol/L    CO2 25 23 - 29 mmol/L    BUN 16 8 - 23 mg/dL    Creatinine 1.3 0.5 - 1.4 mg/dL    Calcium 8.9 8.7 - 10.5 mg/dL    Anion Gap 11 8 - 16 mmol/L     Wounds unchanged , refusing BKA, continue iv antibiotics and local wound care.

## 2023-03-13 NOTE — PROGRESS NOTES
U Infectious Diseases Progress Note    Assessment/Recommendations:     Saji Castañeda is a 73 y.o. male with a PMHx of chronic LLE osteomyelitis, T2DM, HTN, CKD, and CAD, who presents after being sent from [facility] who he follows with outpatient who noted worsening LLE wound and new RLE. Patient reports increasing pain and drainage from the area for the past week. No new numbness, swelling, or weakness. He denies any fevers, chills, nausea, vomiting, or malaise. He was admitted to [facility] for evaluation of worsening wounds.     Patient placed on vanc, cefepime and flagyl - MRI showed no OM of the right ankle but this lesion probed to bone. MRI showed OM of the left heel - this was debridede and cultures pending.  Blood now growing 1/2 bottles of a CNS - staph pasteur.     Recommendations:  1. Keep on vanc and flagyl  - awaits wound cultures  - now growing proteus   - Switch Cefepime to CTX.  - Please obtain PICC (likely will need 6 weeks of therapy)  2. Staph pasteur is a CNS but not part of the skin bossman - for now keep on current abx - will treat this specifically as well - await sensitivities   Repeat BC NGTD      Thank you for allowing us to participate in the care of this patient. Case has been discussed with consult staff, who is in agreement with assessment and plan. ID will continue to follow.     Kendra Mcmahon, DO  [facility] Internal Medicine/Pediatrics PGY-3  [facility] Infectious Diseases Service      Subjective:      No acute events overnight.      Objective:   Last 24 Hour Vital Signs:  BP  Min: 129/75  Max: 175/76  Temp  Av.9 °F (36.6 °C)  Min: 96.6 °F (35.9 °C)  Max: 99.3 °F (37.4 °C)  Pulse  Av.2  Min: 52  Max: 62  Resp  Av  Min: 18  Max: 18  SpO2  Av.3 %  Min: 92 %  Max: 96 %  I/O last 3 completed shifts:  In: 240 [P.O.:240]  Out: 300 [Urine:300]    Physical Exam  Constitutional:       Appearance: Normal appearance. He is not ill-appearing or diaphoretic.   HENT:       Mouth/Throat:      Mouth: Mucous membranes are moist.   Eyes:      General:         Right eye: No discharge.         Left eye: No discharge.      Conjunctiva/sclera: Conjunctivae normal.   Cardiovascular:      Heart sounds: Normal heart sounds. No murmur heard.    No friction rub. No gallop.   Pulmonary:      Effort: Pulmonary effort is normal. No respiratory distress.      Breath sounds: Normal breath sounds. No wheezing.   Abdominal:      General: Abdomen is flat. Bowel sounds are normal.      Palpations: Abdomen is soft.   Musculoskeletal:      Right lower leg: No edema.      Left lower leg: No edema.   Skin:     General: Skin is warm and dry.      Comments: Bandages on bilateral feet. Clean and dry   Neurological:      Mental Status: He is alert.       Laboratory:  Laboratory Data Reviewed: yes  Pertinent Findings:  Recent Labs   Lab 03/09/23  1618 03/10/23  0608 03/11/23  0536 03/12/23  0647 03/13/23  0428   WBC  --    < > 8.92 8.91 8.03   HGB  --    < > 8.4* 8.7* 8.1*   HCT  --    < > 26.4* 27.5* 25.6*   PLT  --    < > 385 409 393   MCV  --    < > 83 83 84   RDW  --    < > 15.5* 15.7* 15.7*      < > 134* 134* 135*   K 3.8   < > 4.2 3.9 4.0      < > 100 99 100   CO2 25   < > 28 25 25   BUN 18   < > 17 23 25*   CREATININE 1.4   < > 1.4 1.7* 1.7*      < > 220* 167* 172*   PROT 8.4  --   --   --  7.3   ALBUMIN 2.4*  --   --   --  1.9*   BILITOT 0.4  --   --   --  0.2   AST 32  --   --   --  22   ALKPHOS 71  --   --   --  54*   ALT 28  --   --   --  18    < > = values in this interval not displayed.         Microbiology Data:  Aerobic culture [[id number]] (Abnormal)  Collected: 03/10/23 2351   Order Status: Completed Specimen: Wound from Leg, Left Updated: 03/13/23 1219    Aerobic Bacterial Culture PROTEUS MIRABILIS   Few   No other significant isolate    Abnormal    Narrative:     Left heel wound   Susceptibility     Proteus mirabilis     CULTURE, AEROBIC  (SPECIFY SOURCE)     Amox/K Clav'ate  <=8/4 mcg/mL Sensitive     Amp/Sulbactam <=8/4 mcg/mL Sensitive     Ampicillin >16 mcg/mL Resistant     Cefazolin 4 mcg/mL Sensitive     Cefepime <=2 mcg/mL Sensitive     Ceftriaxone <=1 mcg/mL Sensitive     Ciprofloxacin <=1 mcg/mL Sensitive     Ertapenem <=0.5 mcg/mL Sensitive     Gentamicin <=4 mcg/mL Sensitive     Levofloxacin <=2 mcg/mL Sensitive     Meropenem <=1 mcg/mL Sensitive     Piperacillin/Tazo <=16 mcg/mL Sensitive     Tobramycin <=4 mcg/mL Sensitive     Trimeth/Sulfa >2/38 mcg/mL Resistant                    Blood culture #1 **CANNOT BE ORDERED STAT** [[id number]] (Abnormal)  Collected: 03/09/23 1525   Order Status: Completed Specimen: Blood from Peripheral, Hand, Right Updated: 03/13/23 1013    Blood Culture, Routine Gram stain aer bottle: Gram positive cocci in clusters resembling Staph     Results called to and read back by:Hamlet Mullen RN 03/10/2023  20:36     STAPHYLOCOCCUS SPECIES   Further identified as Staphylococcus pasteuri    Abnormal    Susceptibility     Staphylococcus species     CULTURE, BLOOD     Clindamycin <=0.5 mcg/mL Sensitive     Erythromycin <=0.5 mcg/mL Sensitive     Oxacillin >2 mcg/mL Resistant     Penicillin 2 mcg/mL Resistant     Tetracycline <=4 mcg/mL Sensitive     Trimeth/Sulfa <=0.5/9.5 m... Sensitive     Vancomycin 2 mcg/mL Sensitive                   Repeat cultures 3/12: NGTD    Antimicrobials:  Vancomycin   Cefepime  Flagyl    Other Results:  Radiology Results:  X-Ray Ankle Complete Right    Result Date: 3/9/2023  EXAMINATION: XR ANKLE COMPLETE 3 VIEW RIGHT CLINICAL HISTORY: Unspecified open wound, unspecified lower leg, initial encounter TECHNIQUE: AP, lateral, and oblique images of the right ankle were performed. COMPARISON: Right foot series 02/23/2022 FINDINGS: Chronic appearing deformity of the mid to distal femoral shaft presumably sequela of remote trauma.  No acute displaced fracture, dislocation or destructive osseous process.  There is DJD. Nonspecific soft  tissue swelling about the distal right lower leg, ankle and dorsal hindfoot particular along the anterior and lateral aspect likely representing cellulitis.  No large soft tissue defect or subcutaneous emphysema seen.  Scattered atherosclerotic vascular and also nonspecific soft tissue calcifications noted.  No radiodense retained foreign body.  Small enthesophyte at the Achilles insertion site.     Distal right lower leg, ankle and hindfoot diffuse nonspecific soft tissue swelling from edema or cellulitis with superimposed more prominent soft tissue swelling along the anterolateral aspect of the distal lower leg.  Clinical correlation advised. Additional chronic appearing findings as above. Electronically signed by: Juan Hernandez MD Date:    03/09/2023 Time:    16:51    US Lower Extremity Arteries Bilateral    Result Date: 3/10/2023  EXAMINATION: US LOWER EXTREMITY ARTERIES BILATERAL CLINICAL HISTORY: chronic left heel osteo;  Other chronic osteomyelitis, left ankle and foot TECHNIQUE: Bilateral lower extremity arterial duplex ultrasound examination performed. Multiple gray scale and color doppler images were obtained in addition to waveform analysis. COMPARISON: Multiple prior imaging studies including CT and ultrasound, the most recent is ultrasound left June 2022 and CT bilateral June 2022 FINDINGS: The peak systolic velocities on the right are as follows, in centimeters/second: Common femoral artery: 165 Superficial femoral artery, proximal: 149 Superficial femoral artery, mid portion: 187 Superficial femoral artery, distal: 117 Popliteal artery: 165, 185 Posterior tibial artery: 46, monophasic Anterior tibial artery: 270 The peak systolic velocities on the left are as follows, in centimeters/second: Common femoral artery: 196 Superficial femoral artery, proximal: 162 Superficial femoral artery, mid portion: 162 Superficial femoral artery, distal: 162 Popliteal artery: 106, 96, biphasic Posterior tibial artery:  89, monophasic Anterior tibial artery: 184, biphasic     No evidence of vascular occlusion or hemodynamically significant stenosis demonstrated in the right or left lower extremity arterial system.  Scattered abnormal waveforms and elevated velocities indicate the presence of vascular disease. Electronically signed by: Pat Asencio MD Date:    03/10/2023 Time:    09:57    MRI Ankle Without Contrast Right    Result Date: 3/9/2023  EXAMINATION: MRI ANKLE WITHOUT CONTRAST RIGHT CLINICAL HISTORY: Soft tissue infection suspected, ankle, xray done; TECHNIQUE: MRI ankle performed per routine protocol without contrast. COMPARISON: MRI right lower extremity from 09/01/2006.  MRI the contralateral ankle from 02/19/2021.  Radiographs of the right ankle from 03/09/2023 taken at 1543. FINDINGS: Bone: There is no evidence of acute osteomyelitis.  Marrow signal is normal.  No marrow edema or T1 hypointense signal. Articulations: There is cartilage loss of the joint between the navicular and the cuneiform bones with adjacent endplate edema and cystic change in the distal pole of the navicular.  Remaining articulations are grossly unremarkable.  There is no evidence of a large joint effusion. Tendons: Flexor, extensor, and peroneal tendons are grossly intact and demonstrate normal signal.  There is no evidence of tenosynovitis.  The Achilles tendon is intact. Ligaments: The anterior inferior and posteroinferior tibiofibular ligaments are intact.  The anterior talofibular ligament is intact.  The calcaneal fibular and posterior talofibular ligaments are intact.  The deltoid and spring ligaments are intact.  The Lisfranc ligament is not entirely included in the field of view. Soft tissues: There is a large dorsal soft tissue ulcer with exposure of the extensor hallucis tendon.  There is circumferential soft tissue edema the ankle extending into the dorsum the hindfoot.     1. No acute osteomyelitis. 2. Large soft tissue ulcer of  the dorsal aspect of the hindfoot and ankle with exposure of the extensor hallucis tendon.  Surrounding soft tissue edema and cellulitis as above. Electronically signed by: Zane Galeana Date:    03/09/2023 Time:    19:45    Echo    Result Date: 3/11/2023  · The left ventricle is mildly enlarged with concentric hypertrophy and normal systolic function. · The estimated ejection fraction is 70%. · Normal left ventricular diastolic function. · Normal right ventricular size with normal right ventricular systolic function. · Severe left atrial enlargement. · Intermediate central venous pressure (8 mmHg). · The estimated PA systolic pressure is 36 mmHg. · Small pericardial effusion. · Valves not well visualized.      MRI Foot (Hindfoot) Left Without Contrast    Result Date: 3/10/2023  EXAMINATION: MRI FOOT (HINDFOOT) LEFT WITHOUT CONTRAST CLINICAL HISTORY: Osteomyelitis, foot;  Other acute osteomyelitis, unspecified ankle and foot TECHNIQUE: Multiplanar, multisequence MR imaging of the  forefoot without the use of intravenous gadolinium IV contrast. COMPARISON: None 02/19/2021 MRI ankle FINDINGS: There is a large soft tissue defect posterior to the calcaneus. Very subtle edema of the posterior calcaneus suggestive of early changes of osteomyelitis. The tibiotalar joint and subtalar joint demonstrate no joint effusion or advanced degenerative change. The soft tissue deep to the Achilles tendon appear within normal limits.  There is mild edema at the insertion of the Achilles tendon onto the calcaneus.. The tendons appear within normal limits.  The visualized ligaments appear to be within normal limits.  The peroneus brevis tendon is thickened. Significant subcutaneous soft tissue edema of the lower leg which is nonspecific. No soft tissue or osseous abscess seen.     Changes of osteomyelitis involving the posterior calcaneus where there is a large soft tissue defect.  It is similar to 02/19/2021 examination. There is edema  involving the distal Achilles tendon near its insertion onto the calcaneus as well. Electronically signed by: Maddy Weeks MD Date:    03/10/2023 Time:    14:45      Current Medications:     Infusions:       Scheduled:   apixaban  5 mg Oral BID    ascorbic acid (vitamin C)  500 mg Oral Daily    aspirin  81 mg Oral Daily    atorvastatin  80 mg Oral Daily    ceFEPime (MAXIPIME) IVPB  2 g Intravenous Q12H    clopidogreL  75 mg Oral Daily    gabapentin  200 mg Oral BID    hydrALAZINE  25 mg Oral TID    hydroCHLOROthiazide  25 mg Oral Daily    insulin aspart U-100  20 Units Subcutaneous TID WM    insulin detemir U-100  35 Units Subcutaneous QHS    isosorbide dinitrate  10 mg Oral TID    losartan  100 mg Oral Daily    metroNIDAZOLE  500 mg Oral Q8H    mupirocin   Nasal BID    NIFEdipine  60 mg Oral Daily    pantoprazole  40 mg Oral Daily    tamsulosin  0.4 mg Oral Daily    vancomycin (VANCOCIN) IVPB  1,500 mg Intravenous Once        PRN:  sodium chloride 0.9%, dextrose 10%, dextrose 10%, dextrose, dextrose, glucagon (human recombinant), insulin aspart U-100, naloxone, sodium chloride 0.9%, Pharmacy to dose Vancomycin consult **AND** vancomycin - pharmacy to dose

## 2023-03-13 NOTE — PLAN OF CARE
Chart reviewed - case discussed in am MDR      dx:  bilat lower extremity wounds   current with [facility]   ID recc P;  Cx P   CM contacted Elba with [facility]  per Secure Chat - referral sent to agency  per [facility].      CM will continue to monitor pt to determine d/c needs.         03/13/23 1055   Post-Acute Status   Post-Acute Authorization Home Health   Home Health Status Referrals Sent   Discharge Delays None known at this time   Discharge Plan   Discharge Plan A Home;Home with family;Home Health

## 2023-03-13 NOTE — PROGRESS NOTES
Pharmacokinetic Assessment Follow Up: IV Vancomycin    Vancomycin serum concentration assessment(s):    The random level was drawn correctly and can be used to guide therapy at this time. The measurement is within the desired definitive target range of 15 to 20 mcg/mL.    Vancomycin Regimen Plan:    Give IV Vancomycin 1500 mg x 1 dose.   Re-dose when the random level is less than 20 mcg/mL, next level to be drawn at 0400 on 3/14    Drug levels (last 3 results):  Recent Labs   Lab Result Units 03/12/23  0954 03/13/23  0428   Vancomycin, Random ug/mL  --  18.1   Vancomycin-Trough ug/mL 28.0*  --        Pharmacy will continue to follow and monitor vancomycin.    Please contact pharmacy at extension [phone] for questions regarding this assessment.    Thank you for the consult,   Dennise Maria       Patient brief summary:  Saji Castañeda is a 73 y.o. male initiated on antimicrobial therapy with IV Vancomycin for treatment of bone/joint infection    The patient's current regimen is pulse dosing d/t YNES    Drug Allergies:   Review of patient's allergies indicates:  No Known Allergies    Actual Body Weight:   122.9 kg    Renal Function:   Estimated Creatinine Clearance: 49.4 mL/min (A) (based on SCr of 1.7 mg/dL (H)).,     Dialysis Method (if applicable):  N/A    CBC (last 72 hours):  Recent Labs   Lab Result Units 03/10/23  0608 03/11/23  0536 03/12/23  0647 03/13/23  0428   WBC K/uL 8.48 8.92 8.91 8.03   Hemoglobin g/dL 10.1* 8.4* 8.7* 8.1*   Hemoglobin A1C % 8.4*  --   --   --    Hematocrit % 33.1* 26.4* 27.5* 25.6*   Platelets K/uL 416 385 409 393   Gran % % 71.8 75.8* 75.6* 73.7*   Lymph % % 17.0* 11.3* 11.6* 11.0*   Mono % % 7.8 10.0 8.5 9.5   Eosinophil % % 2.4 2.4 3.6 4.7   Basophil % % 0.6 0.3 0.3 0.5   Differential Method  Automated Automated Automated Automated       Metabolic Panel (last 72 hours):  Recent Labs   Lab Result Units 03/10/23  0608 03/11/23  0536 03/12/23  0647 03/13/23  0428   Sodium mmol/L 138  134* 134* 135*   Potassium mmol/L 4.8 4.2 3.9 4.0   Chloride mmol/L 102 100 99 100   CO2 mmol/L 25 28 25 25   Glucose mg/dL 181* 220* 167* 172*   BUN mg/dL 16 17 23 25*   Creatinine mg/dL 1.3 1.4 1.7* 1.7*   Albumin g/dL  --   --   --  1.9*   Total Bilirubin mg/dL  --   --   --  0.2   Alkaline Phosphatase U/L  --   --   --  54*   AST U/L  --   --   --  22   ALT U/L  --   --   --  18   Magnesium mg/dL 2.0 2.1 2.1 2.2   Phosphorus mg/dL 3.2 2.7 3.3 3.4       Vancomycin Administrations:  vancomycin given in the last 96 hours                     vancomycin 1,250 mg in dextrose 5 % (D5W) 250 mL IVPB (Vial-Mate) (mg) 1,250 mg New Bag 03/11/23 2302     1,250 mg New Bag  1058    vancomycin (VANCOCIN) 2,500 mg in dextrose 5 % (D5W) 500 mL IVPB (mg) 2,500 mg New Bag 03/10/23 2230    vancomycin (VANCOCIN) 2,500 mg in dextrose 5 % (D5W) 500 mL IVPB (mg) 2,500 mg New Bag 03/09/23 1948                    Microbiologic Results:  Microbiology Results (last 7 days)       Procedure Component Value Units Date/Time    Blood culture [[id number]] Collected: 03/12/23 0647    Order Status: Completed Specimen: Blood from Antecubital, Left Updated: 03/12/23 1945     Blood Culture, Routine No Growth to date    Blood culture [[id number]] Collected: 03/12/23 0648    Order Status: Completed Specimen: Blood Updated: 03/12/23 1945     Blood Culture, Routine No Growth to date    Blood culture #2 **CANNOT BE ORDERED STAT** [[id number]] Collected: 03/10/23 0608    Order Status: Completed Specimen: Blood Updated: 03/12/23 1612     Blood Culture, Routine No Growth to date      No Growth to date      No Growth to date    Culture, Anaerobe [[id number]] Collected: 03/10/23 1741    Order Status: Completed Specimen: Wound from Leg, Left Updated: 03/12/23 1353     Anaerobic Culture Culture in progress    Blood culture #1 **CANNOT BE ORDERED STAT** [[id number]]  (Abnormal) Collected: 03/09/23 1525    Order Status: Completed Specimen: Blood from Peripheral, Hand,  Right Updated: 03/12/23 1304     Blood Culture, Routine Gram stain aer bottle: Gram positive cocci in clusters resembling Staph      Results called to and read back by:Hamlet Mullen RN 03/10/2023  20:36      STAPHYLOCOCCUS SPECIES  Susceptibility pending  Further identified as Staphylococcus pasteuri      Aerobic culture [[id number]]  (Abnormal) Collected: 03/10/23 1741    Order Status: Completed Specimen: Wound from Leg, Left Updated: 03/12/23 0938     Aerobic Bacterial Culture PRESUMPTIVE PROTEUS SPECIES  Few  Identification and susceptibility pending      Narrative:      Left heel wound    MRSA/SA Rapid ID by PCR from Blood culture [[id number]] Collected: 03/09/23 1525    Order Status: Completed Updated: 03/10/23 2214     Staph aureus ID by PCR Negative     MRSA ID by PCR Negative    Aerobic culture [[id number]] Collected: 03/10/23 1741    Order Status: Canceled Specimen: Wound from Leg, Left     Culture, Anaerobe [[id number]] Collected: 03/10/23 1741    Order Status: Canceled Specimen: Wound from Leg, Left     Aerobic culture [[id number]] Collected: 03/10/23 1741    Order Status: Canceled Specimen: Wound from Leg, Left

## 2023-03-13 NOTE — PROGRESS NOTES
"[facility] Medicine Progress Note    Primary Team: [facility] Hospitalist Team B  Attending Physician: Vinod Elise MD  Resident: Seymour Yan MD  Intern: Vi Bains MD (Nak)    Hospital Day: 2 days    Chief Complaint: Chronic BLE wounds w/ superimposed cellulitis worsening over 3wks    Subjective:   NAEON. Afebrile w/ stable vitals on room air. No I&Os recorded overnight. Pt denies fevers, chills, N/V, SOB, chest pain, abd pain, or numbness/weakness. Pt still processing surgery recs during interview.    Review of Systems   All other systems reviewed and are negative.      Objective:   Last 24 Hour Vital Signs:  BP  Min: 120/57  Max: 175/76  Temp  Av.1 °F (36.7 °C)  Min: 96.6 °F (35.9 °C)  Max: 99.3 °F (37.4 °C)  Pulse  Av.3  Min: 49  Max: 62  Resp  Av  Min: 18  Max: 18  SpO2  Av.8 %  Min: 92 %  Max: 96 %  No intake or output data in the 24 hours ending 23 0742     Physical Examination:    Physical Exam   Constitutional: He is oriented to person, place, and time. normal appearance. He appears obese.  Non-toxic appearance. No distress. He does not appear ill.   HENT:   Mouth/Throat: Mucous membranes are moist. Oropharynx is clear.   Eyes: Pupils are equal, round, and reactive to light.   Cardiovascular: Normal rate, regular rhythm, normal heart sounds and normal pulses.  Pulmonary:     Effort: Pulmonary effort is normal.      Breath sounds: Normal breath sounds.   Abdominal: Soft. Normal appearance and bowel sounds are normal.   Musculoskeletal:         General: Swelling and deformity present; stable.      Comments: See media tab on 3/9/23   Neurological: He is alert and oriented to person, place, and time.   Skin: Skin is warm and dry.   See media tab on 3/9/23   Psychiatric: His behavior is normal.      Laboratory:  Recent Labs   Lab 23  1618 03/10/23  0608 23  0536 23  0647 23  0428   WBC  --    < > 8.92 8.91 8.03   HGB  --    < > 8.4* 8.7* 8.1*   HCT  --  "   < > 26.4* 27.5* 25.6*   PLT  --    < > 385 409 393   MCV  --    < > 83 83 84   RDW  --    < > 15.5* 15.7* 15.7*      < > 134* 134* 135*   K 3.8   < > 4.2 3.9 4.0      < > 100 99 100   CO2 25   < > 28 25 25   BUN 18   < > 17 23 25*   CREATININE 1.4   < > 1.4 1.7* 1.7*      < > 220* 167* 172*   PROT 8.4  --   --   --  7.3   ALBUMIN 2.4*  --   --   --  1.9*   BILITOT 0.4  --   --   --  0.2   AST 32  --   --   --  22   ALKPHOS 71  --   --   --  54*   ALT 28  --   --   --  18    < > = values in this interval not displayed.     Microbiology Results (last 7 days)       Procedure Component Value Units Date/Time    Blood culture [[id number]] Collected: 03/12/23 0647    Order Status: Completed Specimen: Blood from Antecubital, Left Updated: 03/12/23 1945     Blood Culture, Routine No Growth to date    Blood culture [[id number]] Collected: 03/12/23 0648    Order Status: Completed Specimen: Blood Updated: 03/12/23 1945     Blood Culture, Routine No Growth to date    Blood culture #2 **CANNOT BE ORDERED STAT** [[id number]] Collected: 03/10/23 0608    Order Status: Completed Specimen: Blood Updated: 03/12/23 1612     Blood Culture, Routine No Growth to date      No Growth to date      No Growth to date    Culture, Anaerobe [[id number]] Collected: 03/10/23 1741    Order Status: Completed Specimen: Wound from Leg, Left Updated: 03/12/23 1353     Anaerobic Culture Culture in progress    Blood culture #1 **CANNOT BE ORDERED STAT** [[id number]]  (Abnormal) Collected: 03/09/23 1525    Order Status: Completed Specimen: Blood from Peripheral, Hand, Right Updated: 03/12/23 1304     Blood Culture, Routine Gram stain aer bottle: Gram positive cocci in clusters resembling Staph      Results called to and read back by:Hamlet Mullen RN 03/10/2023  20:36      STAPHYLOCOCCUS SPECIES  Susceptibility pending  Further identified as Staphylococcus pasteuri      Aerobic culture [[id number]]  (Abnormal) Collected: 03/10/23 1741    Order  Status: Completed Specimen: Wound from Leg, Left Updated: 03/12/23 0938     Aerobic Bacterial Culture PRESUMPTIVE PROTEUS SPECIES  Few  Identification and susceptibility pending      Narrative:      Left heel wound    MRSA/SA Rapid ID by PCR from Blood culture [[id number]] Collected: 03/09/23 1525    Order Status: Completed Updated: 03/10/23 2214     Staph aureus ID by PCR Negative     MRSA ID by PCR Negative    Aerobic culture [[id number]] Collected: 03/10/23 1741    Order Status: Canceled Specimen: Wound from Leg, Left     Culture, Anaerobe [[id number]] Collected: 03/10/23 1741    Order Status: Canceled Specimen: Wound from Leg, Left     Aerobic culture [[id number]] Collected: 03/10/23 1741    Order Status: Canceled Specimen: Wound from Leg, Left           I have personally reviewed the above laboratory studies.     Imaging:  EKG (my interpretation): no indication for EKG this admit at this time    X-Ray Ankle Complete Right    Result Date: 3/9/2023  EXAMINATION: XR ANKLE COMPLETE 3 VIEW RIGHT CLINICAL HISTORY: Unspecified open wound, unspecified lower leg, initial encounter TECHNIQUE: AP, lateral, and oblique images of the right ankle were performed. COMPARISON: Right foot series 02/23/2022 FINDINGS: Chronic appearing deformity of the mid to distal femoral shaft presumably sequela of remote trauma.  No acute displaced fracture, dislocation or destructive osseous process.  There is DJD. Nonspecific soft tissue swelling about the distal right lower leg, ankle and dorsal hindfoot particular along the anterior and lateral aspect likely representing cellulitis.  No large soft tissue defect or subcutaneous emphysema seen.  Scattered atherosclerotic vascular and also nonspecific soft tissue calcifications noted.  No radiodense retained foreign body.  Small enthesophyte at the Achilles insertion site.     Distal right lower leg, ankle and hindfoot diffuse nonspecific soft tissue swelling from edema or cellulitis with  superimposed more prominent soft tissue swelling along the anterolateral aspect of the distal lower leg.  Clinical correlation advised. Additional chronic appearing findings as above. Electronically signed by: Juan Hernandez MD Date:    03/09/2023 Time:    16:51    MRI Ankle Without Contrast Right    Result Date: 3/9/2023  EXAMINATION: MRI ANKLE WITHOUT CONTRAST RIGHT CLINICAL HISTORY: Soft tissue infection suspected, ankle, xray done; TECHNIQUE: MRI ankle performed per routine protocol without contrast. COMPARISON: MRI right lower extremity from 09/01/2006.  MRI the contralateral ankle from 02/19/2021.  Radiographs of the right ankle from 03/09/2023 taken at 1543. FINDINGS: Bone: There is no evidence of acute osteomyelitis.  Marrow signal is normal.  No marrow edema or T1 hypointense signal. Articulations: There is cartilage loss of the joint between the navicular and the cuneiform bones with adjacent endplate edema and cystic change in the distal pole of the navicular.  Remaining articulations are grossly unremarkable.  There is no evidence of a large joint effusion. Tendons: Flexor, extensor, and peroneal tendons are grossly intact and demonstrate normal signal.  There is no evidence of tenosynovitis.  The Achilles tendon is intact. Ligaments: The anterior inferior and posteroinferior tibiofibular ligaments are intact.  The anterior talofibular ligament is intact.  The calcaneal fibular and posterior talofibular ligaments are intact.  The deltoid and spring ligaments are intact.  The Lisfranc ligament is not entirely included in the field of view. Soft tissues: There is a large dorsal soft tissue ulcer with exposure of the extensor hallucis tendon.  There is circumferential soft tissue edema the ankle extending into the dorsum the hindfoot.     1. No acute osteomyelitis. 2. Large soft tissue ulcer of the dorsal aspect of the hindfoot and ankle with exposure of the extensor hallucis tendon.  Surrounding soft tissue  edema and cellulitis as above. Electronically signed by: Zane Galeana Date:    03/09/2023 Time:    19:45       Current Medications:     Scheduled:   apixaban  5 mg Oral BID    ascorbic acid (vitamin C)  500 mg Oral Daily    aspirin  81 mg Oral Daily    atorvastatin  80 mg Oral Daily    ceFEPime (MAXIPIME) IVPB  2 g Intravenous Q12H    clopidogreL  75 mg Oral Daily    gabapentin  200 mg Oral BID    hydrALAZINE  25 mg Oral TID    hydroCHLOROthiazide  25 mg Oral Daily    insulin aspart U-100  20 Units Subcutaneous TID WM    insulin detemir U-100  35 Units Subcutaneous QHS    isosorbide dinitrate  10 mg Oral TID    losartan  100 mg Oral Daily    metroNIDAZOLE  500 mg Oral Q8H    mupirocin   Nasal BID    NIFEdipine  60 mg Oral Daily    pantoprazole  40 mg Oral Daily    tamsulosin  0.4 mg Oral Daily    vancomycin (VANCOCIN) IVPB  1,500 mg Intravenous Once         Infusions:       PRN:  sodium chloride 0.9%, dextrose 10%, dextrose 10%, dextrose, dextrose, glucagon (human recombinant), insulin aspart U-100, naloxone, sodium chloride 0.9%, Pharmacy to dose Vancomycin consult **AND** vancomycin - pharmacy to dose    Antibiotics and Day Number of Therapy:  Antibiotics (From admission, onward)      Start     Stop Route Frequency Ordered    03/13/23 0900  vancomycin 1,500 mg in dextrose 5 % (D5W) 250 mL IVPB (Vial-Mate)         -- IV Once 03/13/23 0543    03/13/23 0900  mupirocin 2 % ointment         03/18 0859 Nasl 2 times daily 03/13/23 0543    03/10/23 2200  metroNIDAZOLE tablet 500 mg         -- Oral Every 8 hours 03/10/23 1627    03/10/23 1730  ceFEPIme (MAXIPIME) 2 g in dextrose 5 % in water (D5W) 5 % 50 mL IVPB (MB+)         -- IV Every 12 hours (non-standard times) 03/10/23 1627    03/10/23 1725  vancomycin - pharmacy to dose  (vancomycin IVPB)        See Hyperspace for full Linked Orders Report.    -- IV pharmacy to manage frequency 03/10/23 1627          Assessment:     Saji Castañeda is a 73 y.o. male with a PMHx of  chronic LLE osteomyelitis,T2DM c/b neuropathy, PVD, DVT in RUE (June 2022), HTN, CKD, Mobitz Type 1, NSTEMI (9/2022) presenting with worsening BLE wounds that has been getting worse over the past 3 wks. Pt will be admitted to [facility] for the evaluation and management of chronic BLE wounds w/ superimposed cellulitis    Plan:     Chronic BLE wounds w/ superimposed cellulitis  Per Wound Care, pt has new R anterior ankle wounds w/ tendon exposure & a known L plantar foot wound w/ palpable bone in L heel from previous visit. Pt sent to ED for possible IV abx +/- debridement.  Afebrile w/ no elevations in WBC and ANC, however cannot r/o systemic infxn given pt's poor immune status 2/2 long T2DM hx  1st B-Cx NGTD; unable to obtain 2nd B-Cx due to difficulty obtaining and ultimately pt refusal  Consulted ID for abx management/regiment given hx of receiving dapto. per ID:  Pending Cxs  Continue Vanc, Cefepime, and Flagyl  Appreciate recs  MRI R Ankle showed no acute osteomyelitis but showed large soft tissue ulcer of the dorsal aspect of the hindfoot and ankle with exposure of the extensor hallucis tendon w/ surrounding soft tissue edema and cellulitis.   BLE U/S showed no evidence of vasc occlusion/stenosis in RLE/LLE arterial system, but does display findings suggestive of vasc ds  MRI L Foot showed changes of osteomyelitis involving the posterior calcaneus where there is a large soft tissue defect.  It is similar to 02/19/2021 examination.  Consulted Podiatry for L heel osteo eval; per Podiatry:  Subacute osteomyelitis of left foot  MRI reviewed with osteomyelitis to left heel. Heel wound without acute signs of infection, site with bone exposure. Bone debridement performed today and sent for culture, see procedure note. Continue z-flex boots to both lower extremities. Nursing orders in for wound care. Antibiotic management per Infectious Disease   Acute osteomyelitis of right ankle  Malodorous acutely infected ulcer to  "right anterior ankle which probes to level of bone and deep to ankle joint. Wound debridement to include soft tissue, tendon and bone, sent for culture. General Surgery consult placed for further management and consideration of BKA. Podiatry will sign off  Appreciate recs  Consulted Surgery for BKA eval; per Surgery:  Discussed BKA w/ pt and refusing at this time; will continue to discuss  Awaiting cultures and ID recs  Appreciate recs  Pending Bone Cx, 3/12 BC x2    T2DM w/ Neuropathy  Last A1c 9.9 about 5 months ago  Home Metformin and Rosuvastatin w/ insulin  POCT glucose checks  A1c 8.4  Currently on Atorvastatin and SSI  Continue home Gabapentin for neuropathy  Continue home Lasix and Tamsulosin for hx inability to empty bladder 2/2 neuropathy     PAD w/ hx of DVT in RUE  DVT in RUE in June 2022  DAPT  Continue Eliquis     HTN  Continue home Hydralazine, Isodil, Losartan, and Nifedipine.     Hx of Type 1 Mobitz  Avoid BB  Unremarkable ROS and PE suggestive of cardiac process at this time  CTM     Hx of NSTEMI  Hx of NSTEMI in 9/2022  TTE during NSTEMI work-up showed LVEF ~60% w/ notable Pulm HTN (PA systolic pressure 41mmHg)  TTE 3/11 showed LVEF ~70% w/ no other acute/significant changes from previous  Unremarkable ROS and PE suggestive of cardiac process at this time  CTM     Ppx: Apixaban  Diet: Diabetic  Code: Full     Disposition: pending improvements w/ BLE wound management    Vi Bains MD (Nak)  [facility] Internal Medicine, PGY-1    [facility] Medicine Hospitalist Pager numbers:   [facility] Hospitalist Medicine Team A (Lorne/Riki): [phone]  [facility] Hospitalist Medicine Team B (Arik/Lucio):  [phone]      "

## 2023-03-14 ENCOUNTER — PATIENT OUTREACH (OUTPATIENT)
Dept: ADMINISTRATIVE | Facility: OTHER | Age: 73
End: 2023-03-14
Payer: MEDICARE

## 2023-03-14 LAB
ALBUMIN SERPL BCP-MCNC: 1.9 G/DL (ref 3.5–5.2)
ALP SERPL-CCNC: 52 U/L (ref 55–135)
ALT SERPL W/O P-5'-P-CCNC: 14 U/L (ref 10–44)
ANION GAP SERPL CALC-SCNC: 9 MMOL/L (ref 8–16)
AST SERPL-CCNC: 16 U/L (ref 10–40)
BASOPHILS # BLD AUTO: 0.02 K/UL (ref 0–0.2)
BASOPHILS NFR BLD: 0.3 % (ref 0–1.9)
BILIRUB SERPL-MCNC: 0.2 MG/DL (ref 0.1–1)
BUN SERPL-MCNC: 29 MG/DL (ref 8–23)
CALCIUM SERPL-MCNC: 8.7 MG/DL (ref 8.7–10.5)
CHLORIDE SERPL-SCNC: 100 MMOL/L (ref 95–110)
CO2 SERPL-SCNC: 25 MMOL/L (ref 23–29)
CREAT SERPL-MCNC: 1.6 MG/DL (ref 0.5–1.4)
DIFFERENTIAL METHOD: ABNORMAL
EOSINOPHIL # BLD AUTO: 0.5 K/UL (ref 0–0.5)
EOSINOPHIL NFR BLD: 6.2 % (ref 0–8)
ERYTHROCYTE [DISTWIDTH] IN BLOOD BY AUTOMATED COUNT: 15.7 % (ref 11.5–14.5)
EST. GFR  (NO RACE VARIABLE): 45 ML/MIN/1.73 M^2
GLUCOSE SERPL-MCNC: 145 MG/DL (ref 70–110)
HCT VFR BLD AUTO: 27.4 % (ref 40–54)
HGB BLD-MCNC: 8.4 G/DL (ref 14–18)
IMM GRANULOCYTES # BLD AUTO: 0.03 K/UL (ref 0–0.04)
IMM GRANULOCYTES NFR BLD AUTO: 0.4 % (ref 0–0.5)
LYMPHOCYTES # BLD AUTO: 1.1 K/UL (ref 1–4.8)
LYMPHOCYTES NFR BLD: 14.4 % (ref 18–48)
MCH RBC QN AUTO: 25.8 PG (ref 27–31)
MCHC RBC AUTO-ENTMCNC: 30.7 G/DL (ref 32–36)
MCV RBC AUTO: 84 FL (ref 82–98)
MONOCYTES # BLD AUTO: 0.7 K/UL (ref 0.3–1)
MONOCYTES NFR BLD: 9.4 % (ref 4–15)
NEUTROPHILS # BLD AUTO: 5.3 K/UL (ref 1.8–7.7)
NEUTROPHILS NFR BLD: 69.3 % (ref 38–73)
NRBC BLD-RTO: 0 /100 WBC
PLATELET # BLD AUTO: 426 K/UL (ref 150–450)
PMV BLD AUTO: 9 FL (ref 9.2–12.9)
POCT GLUCOSE: 169 MG/DL (ref 70–110)
POCT GLUCOSE: 172 MG/DL (ref 70–110)
POCT GLUCOSE: 176 MG/DL (ref 70–110)
POCT GLUCOSE: 179 MG/DL (ref 70–110)
POTASSIUM SERPL-SCNC: 4.1 MMOL/L (ref 3.5–5.1)
PROT SERPL-MCNC: 7.5 G/DL (ref 6–8.4)
RBC # BLD AUTO: 3.25 M/UL (ref 4.6–6.2)
SODIUM SERPL-SCNC: 134 MMOL/L (ref 136–145)
VANCOMYCIN SERPL-MCNC: 14 UG/ML
WBC # BLD AUTO: 7.58 K/UL (ref 3.9–12.7)

## 2023-03-14 PROCEDURE — 63600175 PHARM REV CODE 636 W HCPCS: Performed by: INTERNAL MEDICINE

## 2023-03-14 PROCEDURE — 63600175 PHARM REV CODE 636 W HCPCS: Performed by: STUDENT IN AN ORGANIZED HEALTH CARE EDUCATION/TRAINING PROGRAM

## 2023-03-14 PROCEDURE — 36415 COLL VENOUS BLD VENIPUNCTURE: CPT | Performed by: STUDENT IN AN ORGANIZED HEALTH CARE EDUCATION/TRAINING PROGRAM

## 2023-03-14 PROCEDURE — 25000003 PHARM REV CODE 250

## 2023-03-14 PROCEDURE — 36415 COLL VENOUS BLD VENIPUNCTURE: CPT | Performed by: INTERNAL MEDICINE

## 2023-03-14 PROCEDURE — 25000003 PHARM REV CODE 250: Performed by: STUDENT IN AN ORGANIZED HEALTH CARE EDUCATION/TRAINING PROGRAM

## 2023-03-14 PROCEDURE — 11000001 HC ACUTE MED/SURG PRIVATE ROOM

## 2023-03-14 PROCEDURE — 25000003 PHARM REV CODE 250: Performed by: INTERNAL MEDICINE

## 2023-03-14 PROCEDURE — A4216 STERILE WATER/SALINE, 10 ML: HCPCS | Performed by: STUDENT IN AN ORGANIZED HEALTH CARE EDUCATION/TRAINING PROGRAM

## 2023-03-14 PROCEDURE — 85025 COMPLETE CBC W/AUTO DIFF WBC: CPT | Performed by: STUDENT IN AN ORGANIZED HEALTH CARE EDUCATION/TRAINING PROGRAM

## 2023-03-14 PROCEDURE — 80202 ASSAY OF VANCOMYCIN: CPT | Performed by: INTERNAL MEDICINE

## 2023-03-14 PROCEDURE — 80053 COMPREHEN METABOLIC PANEL: CPT | Performed by: STUDENT IN AN ORGANIZED HEALTH CARE EDUCATION/TRAINING PROGRAM

## 2023-03-14 RX ADMIN — ISOSORBIDE DINITRATE 10 MG: 10 TABLET ORAL at 02:03

## 2023-03-14 RX ADMIN — INSULIN ASPART 20 UNITS: 100 INJECTION, SOLUTION INTRAVENOUS; SUBCUTANEOUS at 05:03

## 2023-03-14 RX ADMIN — INSULIN ASPART 2 UNITS: 100 INJECTION, SOLUTION INTRAVENOUS; SUBCUTANEOUS at 12:03

## 2023-03-14 RX ADMIN — PANTOPRAZOLE SODIUM 40 MG: 40 TABLET, DELAYED RELEASE ORAL at 09:03

## 2023-03-14 RX ADMIN — SODIUM CHLORIDE, PRESERVATIVE FREE 10 ML: 5 INJECTION INTRAVENOUS at 05:03

## 2023-03-14 RX ADMIN — INSULIN DETEMIR 35 UNITS: 100 INJECTION, SOLUTION SUBCUTANEOUS at 08:03

## 2023-03-14 RX ADMIN — ISOSORBIDE DINITRATE 10 MG: 10 TABLET ORAL at 08:03

## 2023-03-14 RX ADMIN — INSULIN ASPART 2 UNITS: 100 INJECTION, SOLUTION INTRAVENOUS; SUBCUTANEOUS at 09:03

## 2023-03-14 RX ADMIN — MUPIROCIN: 20 OINTMENT TOPICAL at 09:03

## 2023-03-14 RX ADMIN — GABAPENTIN 200 MG: 100 CAPSULE ORAL at 09:03

## 2023-03-14 RX ADMIN — CLOPIDOGREL BISULFATE 75 MG: 75 TABLET ORAL at 09:03

## 2023-03-14 RX ADMIN — GABAPENTIN 200 MG: 100 CAPSULE ORAL at 08:03

## 2023-03-14 RX ADMIN — VANCOMYCIN HYDROCHLORIDE 750 MG: 750 INJECTION, POWDER, LYOPHILIZED, FOR SOLUTION INTRAVENOUS at 02:03

## 2023-03-14 RX ADMIN — NIFEDIPINE 60 MG: 30 TABLET, FILM COATED, EXTENDED RELEASE ORAL at 09:03

## 2023-03-14 RX ADMIN — INSULIN ASPART 2 UNITS: 100 INJECTION, SOLUTION INTRAVENOUS; SUBCUTANEOUS at 05:03

## 2023-03-14 RX ADMIN — METRONIDAZOLE 500 MG: 500 TABLET ORAL at 05:03

## 2023-03-14 RX ADMIN — HYDRALAZINE HYDROCHLORIDE 25 MG: 25 TABLET, FILM COATED ORAL at 09:03

## 2023-03-14 RX ADMIN — ISOSORBIDE DINITRATE 10 MG: 10 TABLET ORAL at 09:03

## 2023-03-14 RX ADMIN — INSULIN ASPART 20 UNITS: 100 INJECTION, SOLUTION INTRAVENOUS; SUBCUTANEOUS at 12:03

## 2023-03-14 RX ADMIN — ASPIRIN 81 MG: 81 TABLET, COATED ORAL at 09:03

## 2023-03-14 RX ADMIN — LOSARTAN POTASSIUM 100 MG: 50 TABLET, FILM COATED ORAL at 09:03

## 2023-03-14 RX ADMIN — INSULIN ASPART 20 UNITS: 100 INJECTION, SOLUTION INTRAVENOUS; SUBCUTANEOUS at 09:03

## 2023-03-14 RX ADMIN — METRONIDAZOLE 500 MG: 500 TABLET ORAL at 02:03

## 2023-03-14 RX ADMIN — CEFTRIAXONE SODIUM 2 G: 2 INJECTION, POWDER, FOR SOLUTION INTRAMUSCULAR; INTRAVENOUS at 09:03

## 2023-03-14 RX ADMIN — HYDRALAZINE HYDROCHLORIDE 25 MG: 25 TABLET, FILM COATED ORAL at 02:03

## 2023-03-14 RX ADMIN — APIXABAN 5 MG: 5 TABLET, FILM COATED ORAL at 08:03

## 2023-03-14 RX ADMIN — HYDRALAZINE HYDROCHLORIDE 25 MG: 25 TABLET, FILM COATED ORAL at 08:03

## 2023-03-14 RX ADMIN — APIXABAN 5 MG: 5 TABLET, FILM COATED ORAL at 09:03

## 2023-03-14 RX ADMIN — HYDROCHLOROTHIAZIDE 25 MG: 25 TABLET ORAL at 09:03

## 2023-03-14 RX ADMIN — TAMSULOSIN HYDROCHLORIDE 0.4 MG: 0.4 CAPSULE ORAL at 09:03

## 2023-03-14 RX ADMIN — MUPIROCIN: 20 OINTMENT TOPICAL at 08:03

## 2023-03-14 RX ADMIN — SODIUM CHLORIDE, PRESERVATIVE FREE 10 ML: 5 INJECTION INTRAVENOUS at 11:03

## 2023-03-14 RX ADMIN — METRONIDAZOLE 500 MG: 500 TABLET ORAL at 09:03

## 2023-03-14 RX ADMIN — SODIUM CHLORIDE, PRESERVATIVE FREE 10 ML: 5 INJECTION INTRAVENOUS at 12:03

## 2023-03-14 RX ADMIN — OXYCODONE HYDROCHLORIDE AND ACETAMINOPHEN 500 MG: 500 TABLET ORAL at 09:03

## 2023-03-14 RX ADMIN — ATORVASTATIN CALCIUM 80 MG: 40 TABLET, FILM COATED ORAL at 09:03

## 2023-03-14 NOTE — PROGRESS NOTES
IP Liaison - Initial Visit Note    Patient: Saji Castañeda  MRN:  [id number]  Date of Service:  3/14/2023  Completed by:  LARON Albert    Reason for Visit   Patient presents with    IP Liaison Initial Visit       RSW met with patient at bedside in order to complete SDOH questionnaire and liaison assessment.  Pt has identified no social barriers to care. Pt stated his brother assists pt with transportation. Pt declined the need for resources at this time.    The following were addressed during this visit:  - Review SDOH Questions   - Complete patient assessment   - Complete initial visit with patient        Patient Summary     IP Liaison Patient Assessment    General  Level of Caregiver support: Member independent and does not need caregiver assistance  Have you had to make a decision between paying for any of the following in the last 2 months?: None  Transportation means: Family  Employment status: Disabled  Assessments  Was the PHQ Depression Screening completed this visit?: Yes  Was the DEL-7 Screening completed this visit?: No       LARON Albert

## 2023-03-14 NOTE — NURSING
AAOx4, VS wnl, on room air 96%. Bilateral dressing changed.POC reviewed w/ Pt. Bed locked, in low position, and call light within reach. Will continue to monitor.

## 2023-03-14 NOTE — PROGRESS NOTES
"Surgery follow up  BP (!) 118/58 (BP Location: Right arm, Patient Position: Lying)   Pulse 67   Temp 98 °F (36.7 °C) (Oral)   Resp 18   Ht 5' 9" (1.753 m)   Wt 122.9 kg (271 lb)   SpO2 96%   BMI 40.02 kg/m²   I/O last 3 completed shifts:  In: -   Out: 275 [Urine:275]  No intake/output data recorded.    Recent Results (from the past 336 hour(s))   CBC auto differential    Collection Time: 03/14/23  8:13 AM   Result Value Ref Range    WBC 7.58 3.90 - 12.70 K/uL    Hemoglobin 8.4 (L) 14.0 - 18.0 g/dL    Hematocrit 27.4 (L) 40.0 - 54.0 %    Platelets 426 150 - 450 K/uL   CBC auto differential    Collection Time: 03/13/23  4:28 AM   Result Value Ref Range    WBC 8.03 3.90 - 12.70 K/uL    Hemoglobin 8.1 (L) 14.0 - 18.0 g/dL    Hematocrit 25.6 (L) 40.0 - 54.0 %    Platelets 393 150 - 450 K/uL   CBC with Automated Differential    Collection Time: 03/12/23  6:47 AM   Result Value Ref Range    WBC 8.91 3.90 - 12.70 K/uL    Hemoglobin 8.7 (L) 14.0 - 18.0 g/dL    Hematocrit 27.5 (L) 40.0 - 54.0 %    Platelets 409 150 - 450 K/uL   Patient refusing further surgical options   Continue local treatment and antibiotics.  "

## 2023-03-14 NOTE — PROGRESS NOTES
Pharmacokinetic Assessment Follow Up: IV Vancomycin    Vancomycin serum concentration assessment(s):    The trough level was drawn correctly and can be used to guide therapy at this time. The measurement is within the desired definitive target range of 15 to 20 mcg/mL.    Vancomycin Regimen Plan:    Change regimen to Vancomycin 750 mg IV every 12 hours with next serum trough concentration measured at 1300 prior to 3rd dose on 3/15    Drug levels (last 3 results):  Recent Labs   Lab Result Units 03/12/23  0954 03/13/23 0428 03/14/23  1109   Vancomycin, Random ug/mL  --  18.1 14.0   Vancomycin-Trough ug/mL 28.0*  --   --        Pharmacy will continue to follow and monitor vancomycin.    Please contact pharmacy at extension [phone] for questions regarding this assessment.    Thank you for the consult,   Raymond White       Patient brief summary:  Saji Castañeda is a 73 y.o. male initiated on antimicrobial therapy with IV Vancomycin for treatment of bone/joint infection    The patient's current regimen is vancomycin 750mg IV q12h    Drug Allergies:   Review of patient's allergies indicates:  No Known Allergies    Actual Body Weight:   122.9kg    Renal Function:   Estimated Creatinine Clearance: 52.5 mL/min (A) (based on SCr of 1.6 mg/dL (H)).,     Dialysis Method (if applicable):      CBC (last 72 hours):  Recent Labs   Lab Result Units 03/12/23  0647 03/13/23 0428 03/14/23  0813   WBC K/uL 8.91 8.03 7.58   Hemoglobin g/dL 8.7* 8.1* 8.4*   Hematocrit % 27.5* 25.6* 27.4*   Platelets K/uL 409 393 426   Gran % % 75.6* 73.7* 69.3   Lymph % % 11.6* 11.0* 14.4*   Mono % % 8.5 9.5 9.4   Eosinophil % % 3.6 4.7 6.2   Basophil % % 0.3 0.5 0.3   Differential Method  Automated Automated Automated       Metabolic Panel (last 72 hours):  Recent Labs   Lab Result Units 03/12/23  0647 03/13/23 0428 03/14/23  0813   Sodium mmol/L 134* 135* 134*   Potassium mmol/L 3.9 4.0 4.1   Chloride mmol/L 99 100 100   CO2 mmol/L 25 25 25   Glucose  mg/dL 167* 172* 145*   BUN mg/dL 23 25* 29*   Creatinine mg/dL 1.7* 1.7* 1.6*   Albumin g/dL  --  1.9* 1.9*   Total Bilirubin mg/dL  --  0.2 0.2   Alkaline Phosphatase U/L  --  54* 52*   AST U/L  --  22 16   ALT U/L  --  18 14   Magnesium mg/dL 2.1 2.2  --    Phosphorus mg/dL 3.3 3.4  --        Vancomycin Administrations:  vancomycin given in the last 96 hours                     vancomycin 1,500 mg in dextrose 5 % (D5W) 250 mL IVPB (Vial-Mate) (mg) 1,500 mg New Bag 03/13/23 1132    vancomycin 1,250 mg in dextrose 5 % (D5W) 250 mL IVPB (Vial-Mate) (mg) 1,250 mg New Bag 03/11/23 2302     1,250 mg New Bag  1058    vancomycin (VANCOCIN) 2,500 mg in dextrose 5 % (D5W) 500 mL IVPB (mg) 2,500 mg New Bag 03/10/23 2230                    Microbiologic Results:  Microbiology Results (last 7 days)       Procedure Component Value Units Date/Time    Culture, Anaerobe [[id number]] Collected: 03/10/23 1741    Order Status: Completed Specimen: Wound from Leg, Left Updated: 03/14/23 1057     Anaerobic Culture Culture in progress    Blood culture #2 **CANNOT BE ORDERED STAT** [[id number]] Collected: 03/10/23 0608    Order Status: Completed Specimen: Blood Updated: 03/13/23 1612     Blood Culture, Routine No Growth to date      No Growth to date      No Growth to date      No Growth to date    Blood culture [[id number]] Collected: 03/12/23 0647    Order Status: Completed Specimen: Blood from Antecubital, Left Updated: 03/13/23 1412     Blood Culture, Routine No Growth to date      No Growth to date    Blood culture [[id number]] Collected: 03/12/23 0648    Order Status: Completed Specimen: Blood Updated: 03/13/23 1412     Blood Culture, Routine No Growth to date      No Growth to date    Aerobic culture [[id number]]  (Abnormal)  (Susceptibility) Collected: 03/10/23 1741    Order Status: Completed Specimen: Wound from Leg, Left Updated: 03/13/23 1219     Aerobic Bacterial Culture PROTEUS MIRABILIS  Few  No other significant isolate       Narrative:      Left heel wound    Blood culture #1 **CANNOT BE ORDERED STAT** [[id number]]  (Abnormal)  (Susceptibility) Collected: 03/09/23 1525    Order Status: Completed Specimen: Blood from Peripheral, Hand, Right Updated: 03/13/23 1013     Blood Culture, Routine Gram stain aer bottle: Gram positive cocci in clusters resembling Staph      Results called to and read back by:Hamlet Mullen RN 03/10/2023  20:36      STAPHYLOCOCCUS SPECIES  Further identified as Staphylococcus pasteuri      MRSA/SA Rapid ID by PCR from Blood culture [[id number]] Collected: 03/09/23 1525    Order Status: Completed Updated: 03/10/23 2214     Staph aureus ID by PCR Negative     MRSA ID by PCR Negative    Aerobic culture [[id number]] Collected: 03/10/23 1741    Order Status: Canceled Specimen: Wound from Leg, Left     Culture, Anaerobe [[id number]] Collected: 03/10/23 1741    Order Status: Canceled Specimen: Wound from Leg, Left     Aerobic culture [[id number]] Collected: 03/10/23 1741    Order Status: Canceled Specimen: Wound from Leg, Left

## 2023-03-14 NOTE — DISCHARGE SUMMARY
"[facility] Hospital Medicine Discharge Summary    Primary Team: [facility] Hospitalist Team B  Attending Physician: Vinod Elise MD  Resident: Seymour Yan MD  Intern: Vi Bains MD (Nak)    Date of Admit: 3/9/2023  Date of Discharge: 3/15/2023    Discharge to: Home w/ HH  Condition: Stable    Discharge Diagnoses     Patient Active Problem List   Diagnosis    Edema of leg    Tinea pedis    Wound, open, foot with complication    Diabetic neuropathy    Essential hypertension    Type 2 diabetes, uncontrolled, with neuropathy    Cataract cortical, senile    Chronic kidney disease, stage III (moderate)    Anemia of chronic disease    Asymptomatic Mobitz (type) I (Wenckebach's) atrioventricular block    Peripheral arterial disease    PVD (peripheral vascular disease)    Cellulitis of left ankle    Osteomyelitis of left lower extremity    Pressure injury of left heel, stage 4    Morbid obesity    Iron deficiency anemia    Acute osteomyelitis of calcaneum, left    Leg swelling    Foot pain, left    Acute deep vein thrombosis (DVT) of right upper extremity    Nonhealing ulcer of right lower leg with fat layer exposed    Hypergranulation    Subacute osteomyelitis of left foot    Acute osteomyelitis of right ankle     Consultants and Procedures     Consultants:  Podiatry, General Surgery, ID, CM    Procedures:   Debridement in BLE; PICC line in LUE    Brief History of Present Illness & Summary of Hospital Course   HPI:   Saji Castañeda is a 73 y.o. male with a PMHx of chronic LLE osteomyelitis,T2DM c/b neuropathy, PVD, DVT in RUE (June 2022), HTN, CKD, Mobitz Type 1, NSTEMI (9/2022) presented on 3/9/2023 from [facility] for evaluation of worsening BLE wounds. Per Wound Care, pt has new R anterior ankle wounds w/ tendon exposure & a known L plantar foot wound w/ palpable bone in L heel from previous visit; pt sent to ED for possible IV abx +/- debridement. Work-up in ED notable for chronic BLE wounds w/ " superimposed cellulitis, which pt was admitted for.     Hospital Course:  Over the course of his hospital stay, pt's work-up was concerning for worsening BLE wounds w/ superimposed cellulitis, subacute osteomyelitis of left foot, and acute osteomyelitis of right ankle. Consulted ID for abx management and recommended to continue Vanc IV, Ceftriaxone IV, and Flagyl PO w/ weekly outpatient follow-up labs. Consulted Podiatry for BLE debridement and bone cxs, which the remaining cx can be followed outpatient given pt's clinical improvements. Consulted Surgery for possible bilateral BKA, which has been discussed several times w/ consistent refusals for procedure. Consulted Case Management for discharge planning (home w/ family to  PT/OT v SNF). On the day of discharge, patient was afebrile with stable vital signs and will be discharged in stable condition to home with close follow up.     Discharge Vitals and Physical Exam:   Last 24 Hour Vital Signs:  BP  Min: 120/54  Max: 167/65  Temp  Av °F (36.7 °C)  Min: 97.3 °F (36.3 °C)  Max: 99.2 °F (37.3 °C)  Pulse  Av  Min: 63  Max: 82  Resp  Av  Min: 18  Max: 18  SpO2  Av.2 %  Min: 90 %  Max: 97 %  Body mass index is 40.02 kg/m².  I/O last 3 completed shifts:  In: -   Out: 275 [Urine:275]    Physical Examination:  Physical Exam   Constitutional: He is oriented to person, place, and time. normal appearance. He appears obese.  Non-toxic appearance. No distress. He does not appear ill.   HENT:   Mouth/Throat: Mucous membranes are moist. Oropharynx is clear.   Eyes: Pupils are equal, round, and reactive to light.   Cardiovascular: Normal rate, regular rhythm, normal heart sounds and normal pulses.  Pulmonary:     Effort: Pulmonary effort is normal.      Breath sounds: Normal breath sounds.   Abdominal: Soft. Normal appearance and bowel sounds are normal.   Musculoskeletal:         General: Swelling and deformity present; stable.      Comments: See media tab on  3/9/23   Neurological: He is alert and oriented to person, place, and time.   Skin: Skin is warm and dry. PICC line site in [facility] c/d/i  See media tab on 3/9/23   Psychiatric: His behavior is normal.      Hospital Course By Problem with Pertinent Findings   Chronic BLE wounds w/ superimposed cellulitis (resolved), subacute osteomyelitis of left foot, and acute osteomyelitis of right ankle  Per Wound Care prior to admission, pt had new R anterior ankle wounds w/ tendon exposure & a known L plantar foot wound w/ palpable bone in L heel from previous visit. Pt was sent to ED for possible IV abx +/- debridement.  In ED, afebrile w/ no elevations in WBC and ANC, however unable to r/o systemic infxn given pt's poor immune status 2/2 prolonged T2DM hx  MRI R Ankle showed no acute osteomyelitis but showed large soft tissue ulcer of the dorsal aspect of the hindfoot and ankle with exposure of the extensor hallucis tendon w/ surrounding soft tissue edema and cellulitis.   BLE U/S showed no evidence of vasc occlusion/stenosis in RLE/LLE arterial system, but does display findings suggestive of vasc ds  MRI L Foot showed changes of osteomyelitis involving the posterior calcaneus where there is a large soft tissue defect.   Consulted ID for abx management/regiment given hx of receiving dapto. per ID:  Pt needed PICC line (placed on 3/13 in [facility])  Vanc & Ceftriaxone IV along w/ Flagyl PO for ~6wks (last Vanc Trough 15.6 on 3/15)  End of IV abx date: 4/20/23  Weekly outpt labs on Mon or Tues while on IV abx:  CBC, CMP or BMP, ESR, CRP, CPK, and Vanc trough (goal: 15-20)  Appreciate recs  Consulted Podiatry for L heel osteo eval; per Podiatry:  Subacute osteomyelitis of left foot  MRI reviewed with osteomyelitis to left heel. Heel wound without acute signs of infection, site with bone exposure. Bone debridement performed today and sent for culture, see procedure note. Continue z-flex boots to both lower extremities. Nursing orders in  for wound care. Antibiotic management per Infectious Disease   Acute osteomyelitis of right ankle  Malodorous acutely infected ulcer to right anterior ankle which probes to level of bone and deep to ankle joint. Wound debridement to include soft tissue, tendon and bone, sent for culture. General Surgery consult placed for further management and consideration of BKA.   Appreciate recs  Consulted Surgery for BKA eval; per Surgery:  Dr Almanzar has been discussing w/ pt about BKA however pt has been refusing.  Continue IV Abx and local wound care  Appreciate recs  3/10 Aerobic B-Cx grew Proteus Mirabilis. 3/12 BC x2 NGTD  Pending 3/10 Bone Cx, which can be followed outpatient  Follow-up outpatient w/ PCP and Wound Care    T2DM w/ Neuropathy  3/10 A1c 8.4 (improved from 5 months ago w/ 9.9)  Continue Metformin, Glipizide, and Rosuvastatin w/ insulin  Continue home Gabapentin for neuropathy  Continue home Lasix and Tamsulosin for hx inability to empty bladder 2/2 neuropathy  Follow-up outpatient w/ PCP     PAD w/ hx of DVT in RUE  Hx of DVT in RUE in June 2022  Continue Eliquis & DAPT  Follow-up outpatient w/ PCP & Cardiology     HTN  Continue home Hydralazine, Isordil, Hyzaar, and Nifedipine.  Follow-up outpatient w/ PCP & Cardiology     Hx of Type 1 Mobitz  Avoid BB  Unremarkable ROS and PE suggestive of cardiac process during this hospital admission  Follow-up outpatient w/ PCP & Cardiology     Hx of NSTEMI  Hx of NSTEMI in 9/2022  TTE 9/18/22 during NSTEMI work-up showed LVEF ~60% w/ Pulm HTN (PA systolic pressure 41mmHg)  TTE 3/11/23 showed LVEF ~70% w/ known Pulm HTN (PA systolic pressure 36mmHg); no other acute/significant changes from previous  Unremarkable ROS and PE suggestive of cardiac process during this hospital admission  Follow-up outpatient w/ PCP & Cardiology    Discharge Medications        Medication List        CHANGE how you take these medications      insulin aspart U-100 100 unit/mL (3 mL) Inpn  "pen  Commonly known as: NovoLOG  Inject 20 Units into the skin 3 (three) times daily with meals.  What changed: Another medication with the same name was removed. Continue taking this medication, and follow the directions you see here.            CONTINUE taking these medications      ascorbic acid (vitamin C) 500 MG tablet  Commonly known as: VITAMIN C     aspirin 81 MG EC tablet  Commonly known as: ECOTRIN     B COMPLEX-VITAMIN B12 tablet  Generic drug: b complex vitamins     BD ULTRA-FINE ADITYA PEN NEEDLE 32 gauge x 5/32" Ndle  Generic drug: pen needle, diabetic  USE FOUR TIMES DAILY WITH MEALS AND NIGHTLY     blood sugar diagnostic Strp  Commonly known as: CONTOUR TEST STRIPS  Inject 1 strip into the skin 2 (two) times daily before meals.     clopidogreL 75 mg tablet  Commonly known as: PLAVIX  Take 1 tablet (75 mg total) by mouth once daily.     ELIQUIS 5 mg Tab  Generic drug: apixaban  Take 1 tablet (5 mg total) by mouth 2 (two) times daily.     FLOMAX 0.4 mg Cap  Generic drug: tamsulosin     furosemide 40 MG tablet  Commonly known as: LASIX     gabapentin 100 MG capsule  Commonly known as: NEURONTIN  Take 2 capsules (200 mg total) by mouth 2 (two) times daily.     glipiZIDE 10 MG tablet  Commonly known as: GLUCOTROL     hydrALAZINE 25 MG tablet  Commonly known as: APRESOLINE  Take 1 tablet (25 mg total) by mouth 3 (three) times daily.     isosorbide dinitrate 10 MG tablet  Commonly known as: ISORDIL  Take 1 tablet (10 mg total) by mouth 3 (three) times daily.     Lactobacillus rhamnosus GG 10 billion cell capsule  Commonly known as: CULTURELLE  Take 1 capsule by mouth 2 (two) times daily.     LANTUS SOLOSTAR U-100 INSULIN glargine 100 units/mL SubQ pen  Generic drug: insulin  Inject 45 Units into the skin every evening.     losartan-hydrochlorothiazide 100-25 mg 100-25 mg per tablet  Commonly known as: HYZAAR     metFORMIN 500 MG ER 24hr tablet  Commonly known as: GLUCOPHAGE-XR     MICROLET LANCET " "Misc  Generic drug: lancets     NIFEdipine 60 MG Tbsr  Commonly known as: ADALAT CC     pantoprazole 40 MG tablet  Commonly known as: PROTONIX     rosuvastatin 40 MG Tab  Commonly known as: CRESTOR            STOP taking these medications      acetaminophen 500 MG tablet  Commonly known as: TYLENOL             Discharge Information:   Diet:  Diabetic    Physical Activity:  As tolerated             Instructions:  1. Take all medications as prescribed  2. Keep all follow-up appointments  3. Return to the hospital or call your primary care physicians if any worsening symptoms such as fever, chest pain, shortness of breath, return of symptoms, or any other concerns.    Follow-Up Appointments:  PCP  Wound Care w/ Dr Farrell  Cardiology w/ Dr Jazmin Lebron "Kassandra Bains MD  [facility], PGY-1   "

## 2023-03-14 NOTE — PROCEDURES
"Saji Castañeda is a 73 y.o. male patient.    Temp: 98.7 °F (37.1 °C) (03/13/23 2030)  Pulse: 70 (03/13/23 2030)  Resp: 18 (03/13/23 2030)  BP: 139/63 (03/13/23 2030)  SpO2: 95 % (03/13/23 2030)  Weight: 122.9 kg (271 lb) (03/11/23 1209)  Height: 5' 9" (175.3 cm) (03/11/23 1209)    PICC  Date/Time: 3/13/2023 8:15 PM  Performed by: Chaka Sarkar RN  Consent Done: Yes  Time out: Immediately prior to procedure a time out was called to verify the correct patient, procedure, equipment, support staff and site/side marked as required  Indications: med administration and vascular access  Anesthesia: local infiltration  Local anesthetic: lidocaine 1% without epinephrine  Anesthetic Total (mL): 3  Preparation: skin prepped with ChloraPrep  Skin prep agent dried: skin prep agent completely dried prior to procedure  Sterile barriers: all five maximum sterile barriers used - cap, mask, sterile gown, sterile gloves, and large sterile sheet  Hand hygiene: hand hygiene performed prior to central venous catheter insertion  Location details: left basilic  Catheter type: double lumen  Catheter size: 5 Fr  Catheter Length: 46cm    Ultrasound guidance: yes  Vessel Caliber: large, compressibility normal  Needle advanced into vessel with real time Ultrasound guidance.  Guidewire confirmed in vessel.  Sterile sheath used.  Number of attempts: 1  Post-procedure: blood return through all ports, chlorhexidine patch and sterile dressing applied    Assessment: placement verified by x-ray, tip termination and successful placement  Comments: LUE PICC placed for long-term IV abx up to 6 weeks. First attempt on right side and xray displayed malpositioned picc. Second attempt on LUE.      Name chaka sarkar RN  3/13/2023    "

## 2023-03-14 NOTE — PROGRESS NOTES
[facility] Progress Note    Assessment/Recommendations:     Saji Castañeda is a 73 y.o. male with a PMHx of chronic LLE osteomyelitis, T2DM, HTN, CKD, and CAD, who presents after being sent from [facility] who he follows with outpatient who noted worsening LLE wound and new RLE. Patient reports increasing pain and drainage from the area for the past week. No new numbness, swelling, or weakness. He denies any fevers, chills, nausea, vomiting, or malaise. He was admitted to [facility] for evaluation of worsening wounds.     Patient placed on vanc, cefepime and flagyl - MRI showed no OM of the right ankle but this lesion probed to bone. MRI showed OM of the left heel - this was debridede and cultures pending.  Blood now growing 1/2 bottles of a CNS - staph pasteur.     Recommendations:  1. Keep on vanc and flagyl and CTX will need to complete 6 weeks of IV therapy.    2. Staph pasteur is a CNS but not part of the skin bossman - for now keep on current abx - will treat this specifically as well  Repeat BC NGTD    Infection: left foot osteomyelitis and staph bacteremia.    Discharge antibiotics:   Vancomycin, CTX, and Flagyl    End date of IV antibiotics: 4/20/23    Weekly outpatient laboratory on Monday or Tuesday while on IV antibiotics.   CBC  CMP or BMP  ESR and CRP  Vancomycin trough. Target 15-20  CPK    If vancomycin trough is not at target (15-20) prior to discharge, the please perform vancomycin trough before their fourth outpatient dose.    Need weekly labs CBC, CMP, ESR and CRP. Labs to be faxed to ID office, attention to Dr Gayle, [phone]. Please schedule [facility] follow up 3-4 weeks after discharge. If unable to schedule [facility] - please let [facility] service know.       Thank you for allowing us to participate in the care of this patient. Case has been discussed with consult staff, who is in agreement with assessment and plan. ID will sign off.    Kendra Mcmahon,  DO  [facility]/Pediatric[facility] PGY-3  [facility] Service      Subjective:      No acute events overnight.      Objective:   Last 24 Hour Vital Signs:  BP  Min: 118/58  Max: 178/77  Temp  Av °F (36.7 °C)  Min: 97.1 °F (36.2 °C)  Max: 98.7 °F (37.1 °C)  Pulse  Av.7  Min: 52  Max: 79  Resp  Av  Min: 18  Max: 18  SpO2  Av.8 %  Min: 94 %  Max: 98 %  I/O last 3 completed shifts:  In: -   Out: 275 [Urine:275]    Physical Exam  Constitutional:       Appearance: Normal appearance. He is not ill-appearing or diaphoretic.   HENT:      Mouth/Throat:      Mouth: Mucous membranes are moist.   Eyes:      General:         Right eye: No discharge.         Left eye: No discharge.      Conjunctiva/sclera: Conjunctivae normal.   Cardiovascular:      Heart sounds: Normal heart sounds. No murmur heard.    No friction rub. No gallop.   Pulmonary:      Effort: Pulmonary effort is normal. No respiratory distress.      Breath sounds: Normal breath sounds. No wheezing.   Abdominal:      General: Abdomen is flat. Bowel sounds are normal.      Palpations: Abdomen is soft.   Musculoskeletal:      Right lower leg: No edema.      Left lower leg: No edema.   Skin:     General: Skin is warm and dry.      Comments: Bandages on bilateral feet. Clean and dry   Neurological:      Mental Status: He is alert.       Laboratory:  Laboratory Data Reviewed: yes  Pertinent Findings:  Recent Labs   Lab 23  1618 03/10/23  0608 23  0647 23  0428 23  0813   WBC  --    < > 8.91 8.03 7.58   HGB  --    < > 8.7* 8.1* 8.4*   HCT  --    < > 27.5* 25.6* 27.4*   PLT  --    < > 409 393 426   MCV  --    < > 83 84 84   RDW  --    < > 15.7* 15.7* 15.7*      < > 134* 135* 134*   K 3.8   < > 3.9 4.0 4.1      < > 99 100 100   CO2 25   < > 25 25 25   BUN 18   < > 23 25* 29*   CREATININE 1.4   < > 1.7* 1.7* 1.6*      < > 167* 172* 145*   PROT 8.4  --   --  7.3 7.5   ALBUMIN 2.4*  --   --  1.9* 1.9*    BILITOT 0.4  --   --  0.2 0.2   AST 32  --   --  22 16   ALKPHOS 71  --   --  54* 52*   ALT 28  --   --  18 14    < > = values in this interval not displayed.           Microbiology Data:  Aerobic culture [[id number]] (Abnormal)  Collected: 03/10/23 1741   Order Status: Completed Specimen: Wound from Leg, Left Updated: 03/13/23 1219    Aerobic Bacterial Culture PROTEUS MIRABILIS   Few   No other significant isolate    Abnormal    Narrative:     Left heel wound   Susceptibility     Proteus mirabilis     CULTURE, AEROBIC  (SPECIFY SOURCE)     Amox/K Clav'ate <=8/4 mcg/mL Sensitive     Amp/Sulbactam <=8/4 mcg/mL Sensitive     Ampicillin >16 mcg/mL Resistant     Cefazolin 4 mcg/mL Sensitive     Cefepime <=2 mcg/mL Sensitive     Ceftriaxone <=1 mcg/mL Sensitive     Ciprofloxacin <=1 mcg/mL Sensitive     Ertapenem <=0.5 mcg/mL Sensitive     Gentamicin <=4 mcg/mL Sensitive     Levofloxacin <=2 mcg/mL Sensitive     Meropenem <=1 mcg/mL Sensitive     Piperacillin/Tazo <=16 mcg/mL Sensitive     Tobramycin <=4 mcg/mL Sensitive     Trimeth/Sulfa >2/38 mcg/mL Resistant                    Blood culture #1 **CANNOT BE ORDERED STAT** [[id number]] (Abnormal)  Collected: 03/09/23 1525   Order Status: Completed Specimen: Blood from Peripheral, Hand, Right Updated: 03/13/23 1013    Blood Culture, Routine Gram stain aer bottle: Gram positive cocci in clusters resembling Staph     Results called to and read back by:Hamlet Mullen RN 03/10/2023  20:36     STAPHYLOCOCCUS SPECIES   Further identified as Staphylococcus pasteuri    Abnormal    Susceptibility     Staphylococcus species     CULTURE, BLOOD     Clindamycin <=0.5 mcg/mL Sensitive     Erythromycin <=0.5 mcg/mL Sensitive     Oxacillin >2 mcg/mL Resistant     Penicillin 2 mcg/mL Resistant     Tetracycline <=4 mcg/mL Sensitive     Trimeth/Sulfa <=0.5/9.5 m... Sensitive     Vancomycin 2 mcg/mL Sensitive                   Repeat cultures 3/12: NGTD    Antimicrobials:  Vancomycin    Cefepime  Flagyl    Other Results:  Radiology Results:  X-Ray Ankle Complete Right    Result Date: 3/9/2023  EXAMINATION: XR ANKLE COMPLETE 3 VIEW RIGHT CLINICAL HISTORY: Unspecified open wound, unspecified lower leg, initial encounter TECHNIQUE: AP, lateral, and oblique images of the right ankle were performed. COMPARISON: Right foot series 02/23/2022 FINDINGS: Chronic appearing deformity of the mid to distal femoral shaft presumably sequela of remote trauma.  No acute displaced fracture, dislocation or destructive osseous process.  There is DJD. Nonspecific soft tissue swelling about the distal right lower leg, ankle and dorsal hindfoot particular along the anterior and lateral aspect likely representing cellulitis.  No large soft tissue defect or subcutaneous emphysema seen.  Scattered atherosclerotic vascular and also nonspecific soft tissue calcifications noted.  No radiodense retained foreign body.  Small enthesophyte at the Achilles insertion site.     Distal right lower leg, ankle and hindfoot diffuse nonspecific soft tissue swelling from edema or cellulitis with superimposed more prominent soft tissue swelling along the anterolateral aspect of the distal lower leg.  Clinical correlation advised. Additional chronic appearing findings as above. Electronically signed by: Juan Hernandez MD Date:    03/09/2023 Time:    16:51    US Lower Extremity Arteries Bilateral    Result Date: 3/10/2023  EXAMINATION: US LOWER EXTREMITY ARTERIES BILATERAL CLINICAL HISTORY: chronic left heel osteo;  Other chronic osteomyelitis, left ankle and foot TECHNIQUE: Bilateral lower extremity arterial duplex ultrasound examination performed. Multiple gray scale and color doppler images were obtained in addition to waveform analysis. COMPARISON: Multiple prior imaging studies including CT and ultrasound, the most recent is ultrasound left June 2022 and CT bilateral June 2022 FINDINGS: The peak systolic velocities on the right are as  follows, in centimeters/second: Common femoral artery: 165 Superficial femoral artery, proximal: 149 Superficial femoral artery, mid portion: 187 Superficial femoral artery, distal: 117 Popliteal artery: 165, 185 Posterior tibial artery: 46, monophasic Anterior tibial artery: 270 The peak systolic velocities on the left are as follows, in centimeters/second: Common femoral artery: 196 Superficial femoral artery, proximal: 162 Superficial femoral artery, mid portion: 162 Superficial femoral artery, distal: 162 Popliteal artery: 106, 96, biphasic Posterior tibial artery: 89, monophasic Anterior tibial artery: 184, biphasic     No evidence of vascular occlusion or hemodynamically significant stenosis demonstrated in the right or left lower extremity arterial system.  Scattered abnormal waveforms and elevated velocities indicate the presence of vascular disease. Electronically signed by: Pat Asencio MD Date:    03/10/2023 Time:    09:57    MRI Ankle Without Contrast Right    Result Date: 3/9/2023  EXAMINATION: MRI ANKLE WITHOUT CONTRAST RIGHT CLINICAL HISTORY: Soft tissue infection suspected, ankle, xray done; TECHNIQUE: MRI ankle performed per routine protocol without contrast. COMPARISON: MRI right lower extremity from 09/01/2006.  MRI the contralateral ankle from 02/19/2021.  Radiographs of the right ankle from 03/09/2023 taken at 1543. FINDINGS: Bone: There is no evidence of acute osteomyelitis.  Marrow signal is normal.  No marrow edema or T1 hypointense signal. Articulations: There is cartilage loss of the joint between the navicular and the cuneiform bones with adjacent endplate edema and cystic change in the distal pole of the navicular.  Remaining articulations are grossly unremarkable.  There is no evidence of a large joint effusion. Tendons: Flexor, extensor, and peroneal tendons are grossly intact and demonstrate normal signal.  There is no evidence of tenosynovitis.  The Achilles tendon is intact.  Ligaments: The anterior inferior and posteroinferior tibiofibular ligaments are intact.  The anterior talofibular ligament is intact.  The calcaneal fibular and posterior talofibular ligaments are intact.  The deltoid and spring ligaments are intact.  The Lisfranc ligament is not entirely included in the field of view. Soft tissues: There is a large dorsal soft tissue ulcer with exposure of the extensor hallucis tendon.  There is circumferential soft tissue edema the ankle extending into the dorsum the hindfoot.     1. No acute osteomyelitis. 2. Large soft tissue ulcer of the dorsal aspect of the hindfoot and ankle with exposure of the extensor hallucis tendon.  Surrounding soft tissue edema and cellulitis as above. Electronically signed by: Zane Galeana Date:    03/09/2023 Time:    19:45    Echo    Result Date: 3/11/2023  · The left ventricle is mildly enlarged with concentric hypertrophy and normal systolic function. · The estimated ejection fraction is 70%. · Normal left ventricular diastolic function. · Normal right ventricular size with normal right ventricular systolic function. · Severe left atrial enlargement. · Intermediate central venous pressure (8 mmHg). · The estimated PA systolic pressure is 36 mmHg. · Small pericardial effusion. · Valves not well visualized.      MRI Foot (Hindfoot) Left Without Contrast    Result Date: 3/10/2023  EXAMINATION: MRI FOOT (HINDFOOT) LEFT WITHOUT CONTRAST CLINICAL HISTORY: Osteomyelitis, foot;  Other acute osteomyelitis, unspecified ankle and foot TECHNIQUE: Multiplanar, multisequence MR imaging of the  forefoot without the use of intravenous gadolinium IV contrast. COMPARISON: None 02/19/2021 MRI ankle FINDINGS: There is a large soft tissue defect posterior to the calcaneus. Very subtle edema of the posterior calcaneus suggestive of early changes of osteomyelitis. The tibiotalar joint and subtalar joint demonstrate no joint effusion or advanced degenerative change. The  soft tissue deep to the Achilles tendon appear within normal limits.  There is mild edema at the insertion of the Achilles tendon onto the calcaneus.. The tendons appear within normal limits.  The visualized ligaments appear to be within normal limits.  The peroneus brevis tendon is thickened. Significant subcutaneous soft tissue edema of the lower leg which is nonspecific. No soft tissue or osseous abscess seen.     Changes of osteomyelitis involving the posterior calcaneus where there is a large soft tissue defect.  It is similar to 02/19/2021 examination. There is edema involving the distal Achilles tendon near its insertion onto the calcaneus as well. Electronically signed by: Maddy Weeks MD Date:    03/10/2023 Time:    14:45      Current Medications:     Infusions:       Scheduled:   apixaban  5 mg Oral BID    ascorbic acid (vitamin C)  500 mg Oral Daily    aspirin  81 mg Oral Daily    atorvastatin  80 mg Oral Daily    cefTRIAXone (ROCEPHIN) IVPB  2 g Intravenous Q24H    clopidogreL  75 mg Oral Daily    gabapentin  200 mg Oral BID    hydrALAZINE  25 mg Oral TID    hydroCHLOROthiazide  25 mg Oral Daily    insulin aspart U-100  20 Units Subcutaneous TID WM    insulin detemir U-100  35 Units Subcutaneous QHS    isosorbide dinitrate  10 mg Oral TID    losartan  100 mg Oral Daily    metroNIDAZOLE  500 mg Oral Q8H    mupirocin   Nasal BID    NIFEdipine  60 mg Oral Daily    pantoprazole  40 mg Oral Daily    sodium chloride 0.9%  10 mL Intravenous Q6H    tamsulosin  0.4 mg Oral Daily    vancomycin (VANCOCIN) IVPB  750 mg Intravenous Q12H        PRN:  sodium chloride 0.9%, dextrose 10%, dextrose 10%, dextrose, dextrose, glucagon (human recombinant), insulin aspart U-100, naloxone, sodium chloride 0.9%, Flushing PICC Protocol **AND** sodium chloride 0.9% **AND** sodium chloride 0.9%, Pharmacy to dose Vancomycin consult **AND** vancomycin - pharmacy to dose

## 2023-03-14 NOTE — PROGRESS NOTES
"[facility] Medicine Progress Note    Primary Team: [facility] Hospitalist Team B  Attending Physician: Vinod Elise MD  Resident: Seymour Yan MD  Intern: Vi Bains MD (Nak)    Hospital Day: 3 days    Chief Complaint: Chronic BLE wounds w/ superimposed cellulitis worsening over 3wks    Subjective:   NAEON. Afebrile w/ stable vitals on room air. BUN, Cr, and H&Hs are stable. Pt seen by writer this morning. Pt received PICC line & BLE wound dressing changes overnight. Pt denied fevers, chills, N/V, SOB, chest pain, abd pain, or numbness/weakness. Pt stated that he is still processing surgery recs during interview.    Review of Systems   All other systems reviewed and are negative.      Objective:   Last 24 Hour Vital Signs:  BP  Min: 139/63  Max: 178/77  Temp  Av °F (36.7 °C)  Min: 97.1 °F (36.2 °C)  Max: 98.7 °F (37.1 °C)  Pulse  Av.5  Min: 54  Max: 79  Resp  Av  Min: 18  Max: 18  SpO2  Av.5 %  Min: 94 %  Max: 96 %    Intake/Output Summary (Last 24 hours) at 3/14/2023 0736  Last data filed at 3/13/2023 1212  Gross per 24 hour   Intake --   Output 275 ml   Net -275 ml        Physical Examination:    Physical Exam   Constitutional: He is oriented to person, place, and time. normal appearance. He appears obese.  Non-toxic appearance. No distress. He does not appear ill.   HENT:   Mouth/Throat: Mucous membranes are moist. Oropharynx is clear.   Eyes: Pupils are equal, round, and reactive to light.   Cardiovascular: Normal rate, regular rhythm, normal heart sounds and normal pulses.  Pulmonary:     Effort: Pulmonary effort is normal.      Breath sounds: Normal breath sounds.   Abdominal: Soft. Normal appearance and bowel sounds are normal.   Musculoskeletal:         General: Swelling and deformity present; stable.      Comments: See media tab on 3/9/23   Neurological: He is alert and oriented to person, place, and time.   Skin: Skin is warm and dry. PICC line site in LUE c/d/i  See media tab " on 3/9/23   Psychiatric: His behavior is normal.      Laboratory:  Recent Labs   Lab 03/09/23  1618 03/10/23  0608 03/11/23  0536 03/12/23  0647 03/13/23  0428   WBC  --    < > 8.92 8.91 8.03   HGB  --    < > 8.4* 8.7* 8.1*   HCT  --    < > 26.4* 27.5* 25.6*   PLT  --    < > 385 409 393   MCV  --    < > 83 83 84   RDW  --    < > 15.5* 15.7* 15.7*      < > 134* 134* 135*   K 3.8   < > 4.2 3.9 4.0      < > 100 99 100   CO2 25   < > 28 25 25   BUN 18   < > 17 23 25*   CREATININE 1.4   < > 1.4 1.7* 1.7*      < > 220* 167* 172*   PROT 8.4  --   --   --  7.3   ALBUMIN 2.4*  --   --   --  1.9*   BILITOT 0.4  --   --   --  0.2   AST 32  --   --   --  22   ALKPHOS 71  --   --   --  54*   ALT 28  --   --   --  18    < > = values in this interval not displayed.       Microbiology Results (last 7 days)       Procedure Component Value Units Date/Time    Blood culture #2 **CANNOT BE ORDERED STAT** [[id number]] Collected: 03/10/23 0608    Order Status: Completed Specimen: Blood Updated: 03/13/23 1612     Blood Culture, Routine No Growth to date      No Growth to date      No Growth to date      No Growth to date    Blood culture [[id number]] Collected: 03/12/23 0647    Order Status: Completed Specimen: Blood from Antecubital, Left Updated: 03/13/23 1412     Blood Culture, Routine No Growth to date      No Growth to date    Blood culture [[id number]] Collected: 03/12/23 0648    Order Status: Completed Specimen: Blood Updated: 03/13/23 1412     Blood Culture, Routine No Growth to date      No Growth to date    Aerobic culture [[id number]]  (Abnormal)  (Susceptibility) Collected: 03/10/23 1741    Order Status: Completed Specimen: Wound from Leg, Left Updated: 03/13/23 1219     Aerobic Bacterial Culture PROTEUS MIRABILIS  Few  No other significant isolate      Narrative:      Left heel wound    Blood culture #1 **CANNOT BE ORDERED STAT** [[id number]]  (Abnormal)  (Susceptibility) Collected: 03/09/23 1525    Order Status:  Completed Specimen: Blood from Peripheral, Hand, Right Updated: 03/13/23 1013     Blood Culture, Routine Gram stain aer bottle: Gram positive cocci in clusters resembling Staph      Results called to and read back by:Hamlet Mullen RN 03/10/2023  20:36      STAPHYLOCOCCUS SPECIES  Further identified as Staphylococcus pasteuri      Culture, Anaerobe [[id number]] Collected: 03/10/23 1741    Order Status: Completed Specimen: Wound from Leg, Left Updated: 03/12/23 1353     Anaerobic Culture Culture in progress    MRSA/SA Rapid ID by PCR from Blood culture [[id number]] Collected: 03/09/23 1525    Order Status: Completed Updated: 03/10/23 2214     Staph aureus ID by PCR Negative     MRSA ID by PCR Negative    Aerobic culture [[id number]] Collected: 03/10/23 1741    Order Status: Canceled Specimen: Wound from Leg, Left     Culture, Anaerobe [[id number]] Collected: 03/10/23 1741    Order Status: Canceled Specimen: Wound from Leg, Left     Aerobic culture [[id number]] Collected: 03/10/23 1741    Order Status: Canceled Specimen: Wound from Leg, Left           I have personally reviewed the above laboratory studies.     Imaging:  EKG (my interpretation): no indication for EKG this admit at this time    X-Ray Ankle Complete Right    Result Date: 3/9/2023  EXAMINATION: XR ANKLE COMPLETE 3 VIEW RIGHT CLINICAL HISTORY: Unspecified open wound, unspecified lower leg, initial encounter TECHNIQUE: AP, lateral, and oblique images of the right ankle were performed. COMPARISON: Right foot series 02/23/2022 FINDINGS: Chronic appearing deformity of the mid to distal femoral shaft presumably sequela of remote trauma.  No acute displaced fracture, dislocation or destructive osseous process.  There is DJD. Nonspecific soft tissue swelling about the distal right lower leg, ankle and dorsal hindfoot particular along the anterior and lateral aspect likely representing cellulitis.  No large soft tissue defect or subcutaneous emphysema seen.  Scattered  atherosclerotic vascular and also nonspecific soft tissue calcifications noted.  No radiodense retained foreign body.  Small enthesophyte at the Achilles insertion site.     Distal right lower leg, ankle and hindfoot diffuse nonspecific soft tissue swelling from edema or cellulitis with superimposed more prominent soft tissue swelling along the anterolateral aspect of the distal lower leg.  Clinical correlation advised. Additional chronic appearing findings as above. Electronically signed by: Juan Hernandez MD Date:    03/09/2023 Time:    16:51    MRI Ankle Without Contrast Right    Result Date: 3/9/2023  EXAMINATION: MRI ANKLE WITHOUT CONTRAST RIGHT CLINICAL HISTORY: Soft tissue infection suspected, ankle, xray done; TECHNIQUE: MRI ankle performed per routine protocol without contrast. COMPARISON: MRI right lower extremity from 09/01/2006.  MRI the contralateral ankle from 02/19/2021.  Radiographs of the right ankle from 03/09/2023 taken at 1543. FINDINGS: Bone: There is no evidence of acute osteomyelitis.  Marrow signal is normal.  No marrow edema or T1 hypointense signal. Articulations: There is cartilage loss of the joint between the navicular and the cuneiform bones with adjacent endplate edema and cystic change in the distal pole of the navicular.  Remaining articulations are grossly unremarkable.  There is no evidence of a large joint effusion. Tendons: Flexor, extensor, and peroneal tendons are grossly intact and demonstrate normal signal.  There is no evidence of tenosynovitis.  The Achilles tendon is intact. Ligaments: The anterior inferior and posteroinferior tibiofibular ligaments are intact.  The anterior talofibular ligament is intact.  The calcaneal fibular and posterior talofibular ligaments are intact.  The deltoid and spring ligaments are intact.  The Lisfranc ligament is not entirely included in the field of view. Soft tissues: There is a large dorsal soft tissue ulcer with exposure of the extensor  hallucis tendon.  There is circumferential soft tissue edema the ankle extending into the dorsum the hindfoot.     1. No acute osteomyelitis. 2. Large soft tissue ulcer of the dorsal aspect of the hindfoot and ankle with exposure of the extensor hallucis tendon.  Surrounding soft tissue edema and cellulitis as above. Electronically signed by: Zane Galeana Date:    03/09/2023 Time:    19:45       Current Medications:     Scheduled:   apixaban  5 mg Oral BID    ascorbic acid (vitamin C)  500 mg Oral Daily    aspirin  81 mg Oral Daily    atorvastatin  80 mg Oral Daily    cefTRIAXone (ROCEPHIN) IVPB  1 g Intravenous Q24H    clopidogreL  75 mg Oral Daily    gabapentin  200 mg Oral BID    hydrALAZINE  25 mg Oral TID    hydroCHLOROthiazide  25 mg Oral Daily    insulin aspart U-100  20 Units Subcutaneous TID WM    insulin detemir U-100  35 Units Subcutaneous QHS    isosorbide dinitrate  10 mg Oral TID    losartan  100 mg Oral Daily    metroNIDAZOLE  500 mg Oral Q8H    mupirocin   Nasal BID    NIFEdipine  60 mg Oral Daily    pantoprazole  40 mg Oral Daily    sodium chloride 0.9%  10 mL Intravenous Q6H    tamsulosin  0.4 mg Oral Daily         Infusions:       PRN:  sodium chloride 0.9%, dextrose 10%, dextrose 10%, dextrose, dextrose, glucagon (human recombinant), insulin aspart U-100, naloxone, sodium chloride 0.9%, Flushing PICC Protocol **AND** sodium chloride 0.9% **AND** sodium chloride 0.9%, Pharmacy to dose Vancomycin consult **AND** vancomycin - pharmacy to dose    Antibiotics and Day Number of Therapy:  Antibiotics (From admission, onward)      Start     Stop Route Frequency Ordered    03/13/23 2230  cefTRIAXone (ROCEPHIN) 1 g in dextrose 5 % in water (D5W) 5 % 50 mL IVPB (MB+)         -- IV Every 24 hours (non-standard times) 03/13/23 1953    03/13/23 0900  mupirocin 2 % ointment         03/18 0859 Nasl 2 times daily 03/13/23 0543    03/10/23 2200  metroNIDAZOLE tablet 500 mg         -- Oral Every 8 hours 03/10/23  1627    03/10/23 1725  vancomycin - pharmacy to dose  (vancomycin IVPB)        See Ham for full Linked Orders Report.    -- IV pharmacy to manage frequency 03/10/23 1627          Assessment:     Saji Castañeda is a 73 y.o. male with a PMHx of chronic LLE osteomyelitis,T2DM c/b neuropathy, PVD, DVT in RUE (June 2022), HTN, CKD, Mobitz Type 1, NSTEMI (9/2022) presenting with worsening BLE wounds that has been getting worse over the past 3 wks. Pt will be admitted to [facility] for the evaluation and management of chronic BLE wounds w/ superimposed cellulitis    Plan:     Chronic BLE wounds w/ superimposed cellulitis  Per Wound Care, pt has new R anterior ankle wounds w/ tendon exposure & a known L plantar foot wound w/ palpable bone in L heel from previous visit. Pt sent to ED for possible IV abx +/- debridement.  Afebrile w/ no elevations in WBC and ANC, however cannot r/o systemic infxn given pt's poor immune status 2/2 long T2DM hx  Consulted ID for abx management/regiment given hx of receiving dapto. per ID:  Pending Wound Cxs; now growing Proteus from bone w/ concerns of staph in blood that may be part of bone infxn  Switch Cefepime to Ceftriaxone  PICC line placed on 3/13 in [facility]; will require 6 wks of therapy  Continue Vanc & Ceftriaxone IV; Flagyl PO  Need repeat B-Cxs that are negative after  Appreciate recs  MRI R Ankle showed no acute osteomyelitis but showed large soft tissue ulcer of the dorsal aspect of the hindfoot and ankle with exposure of the extensor hallucis tendon w/ surrounding soft tissue edema and cellulitis.   BLE U/S showed no evidence of vasc occlusion/stenosis in RLE/LLE arterial system, but does display findings suggestive of vasc ds  MRI L Foot showed changes of osteomyelitis involving the posterior calcaneus where there is a large soft tissue defect.  It is similar to 02/19/2021 examination.  Consulted Podiatry for L heel osteo eval; per Podiatry:  Subacute osteomyelitis of left  "foot  MRI reviewed with osteomyelitis to left heel. Heel wound without acute signs of infection, site with bone exposure. Bone debridement performed today and sent for culture, see procedure note. Continue z-flex boots to both lower extremities. Nursing orders in for wound care. Antibiotic management per Infectious Disease   Acute osteomyelitis of right ankle  Malodorous acutely infected ulcer to right anterior ankle which probes to level of bone and deep to ankle joint. Wound debridement to include soft tissue, tendon and bone, sent for culture. General Surgery consult placed for further management and consideration of BKA. Podiatry will sign off  Appreciate recs  Consulted Surgery for BKA eval; per Surgery:  Dr Almanzar has been discussing w/ pt about BKA however pt has been refusing so far.  Continue IV Abx and local wound care  Appreciate recs  3/10 Aerobic B-Cx grew Proteus Mirabilis. 3/12 BC x2 NGTD  Pending 3/10 Bone Cx    T2DM w/ Neuropathy  Last A1c 9.9 about 5 months ago  Home Metformin and Rosuvastatin w/ insulin  POCT glucose checks  A1c 8.4  Currently on Atorvastatin and SSI  Continue home Gabapentin for neuropathy  Continue home Lasix and Tamsulosin for hx inability to empty bladder 2/2 neuropathy     PAD w/ hx of DVT in RUE  DVT in RUE in June 2022  DAPT  Continue Eliquis     HTN  Continue home Hydralazine, Isodil, Losartan, and Nifedipine.     Hx of Type 1 Mobitz  Avoid BB  Unremarkable ROS and PE suggestive of cardiac process at this time  CTM     Hx of NSTEMI  Hx of NSTEMI in 9/2022  TTE during NSTEMI work-up showed LVEF ~60% w/ notable Pulm HTN (PA systolic pressure 41mmHg)  TTE 3/11 showed LVEF ~70% w/ no other acute/significant changes from previous  Unremarkable ROS and PE suggestive of cardiac process at this time  CTM     Ppx: Apixaban  Diet: Diabetic  Code: Full     Disposition: pending improvements w/ BLE wound management    iV Bains MD (Nak)  [facility], PGY-1    [facility] " Medicine Hospitalist Pager numbers:   [facility] Hospitalist Medicine Team A (Lorne/Riki): [phone]  [facility] Hospitalist Medicine Team B (Arik/Lucio):  [phone]

## 2023-03-15 ENCOUNTER — PATIENT OUTREACH (OUTPATIENT)
Dept: ADMINISTRATIVE | Facility: OTHER | Age: 73
End: 2023-03-15
Payer: MEDICARE

## 2023-03-15 PROBLEM — N28.9 ACUTE RENAL INSUFFICIENCY: Status: RESOLVED | Noted: 2022-06-20 | Resolved: 2023-03-15

## 2023-03-15 PROBLEM — R35.0 FREQUENCY OF URINATION: Status: RESOLVED | Noted: 2022-04-22 | Resolved: 2023-03-15

## 2023-03-15 PROBLEM — L97.509 ISCHEMIC ULCER DIABETIC FOOT: Status: RESOLVED | Noted: 2019-12-04 | Resolved: 2023-03-15

## 2023-03-15 PROBLEM — L97.222: Status: RESOLVED | Noted: 2021-01-29 | Resolved: 2023-03-15

## 2023-03-15 PROBLEM — Z73.6 LIMITATION OF ACTIVITIES DUE TO DISABILITY: Status: RESOLVED | Noted: 2021-02-18 | Resolved: 2023-03-15

## 2023-03-15 PROBLEM — E11.621 ISCHEMIC ULCER DIABETIC FOOT: Status: RESOLVED | Noted: 2019-12-04 | Resolved: 2023-03-15

## 2023-03-15 PROBLEM — S91.302A NON HEALING LEFT HEEL WOUND: Status: RESOLVED | Noted: 2023-02-14 | Resolved: 2023-03-15

## 2023-03-15 PROBLEM — M86.672: Status: RESOLVED | Noted: 2022-09-25 | Resolved: 2023-03-15

## 2023-03-15 PROBLEM — E87.6 HYPOKALEMIA: Status: RESOLVED | Noted: 2021-02-18 | Resolved: 2023-03-15

## 2023-03-15 PROBLEM — L30.9 DERMATITIS: Status: RESOLVED | Noted: 2021-01-29 | Resolved: 2023-03-15

## 2023-03-15 PROBLEM — L97.212: Status: RESOLVED | Noted: 2021-01-29 | Resolved: 2023-03-15

## 2023-03-15 PROBLEM — H25.011 CORTICAL AGE-RELATED CATARACT OF RIGHT EYE: Status: RESOLVED | Noted: 2017-09-05 | Resolved: 2023-03-15

## 2023-03-15 PROBLEM — L97.922: Status: RESOLVED | Noted: 2022-09-25 | Resolved: 2023-03-15

## 2023-03-15 PROBLEM — N17.9 AKI (ACUTE KIDNEY INJURY): Status: RESOLVED | Noted: 2018-10-15 | Resolved: 2023-03-15

## 2023-03-15 PROBLEM — R07.9 CHEST PAIN: Status: RESOLVED | Noted: 2018-10-15 | Resolved: 2023-03-15

## 2023-03-15 LAB
ANION GAP SERPL CALC-SCNC: 9 MMOL/L (ref 8–16)
BACTERIA BLD CULT: NORMAL
BACTERIA SPEC ANAEROBE CULT: NORMAL
BUN SERPL-MCNC: 30 MG/DL (ref 8–23)
CALCIUM SERPL-MCNC: 8.8 MG/DL (ref 8.7–10.5)
CHLORIDE SERPL-SCNC: 100 MMOL/L (ref 95–110)
CO2 SERPL-SCNC: 25 MMOL/L (ref 23–29)
CREAT SERPL-MCNC: 1.6 MG/DL (ref 0.5–1.4)
EST. GFR  (NO RACE VARIABLE): 45 ML/MIN/1.73 M^2
GLUCOSE SERPL-MCNC: 128 MG/DL (ref 70–110)
POCT GLUCOSE: 104 MG/DL (ref 70–110)
POCT GLUCOSE: 110 MG/DL (ref 70–110)
POCT GLUCOSE: 149 MG/DL (ref 70–110)
POCT GLUCOSE: 92 MG/DL (ref 70–110)
POTASSIUM SERPL-SCNC: 4.4 MMOL/L (ref 3.5–5.1)
SODIUM SERPL-SCNC: 134 MMOL/L (ref 136–145)
VANCOMYCIN TROUGH SERPL-MCNC: 15.6 UG/ML (ref 10–22)

## 2023-03-15 PROCEDURE — 25000003 PHARM REV CODE 250: Performed by: STUDENT IN AN ORGANIZED HEALTH CARE EDUCATION/TRAINING PROGRAM

## 2023-03-15 PROCEDURE — 63600175 PHARM REV CODE 636 W HCPCS: Performed by: INTERNAL MEDICINE

## 2023-03-15 PROCEDURE — 36415 COLL VENOUS BLD VENIPUNCTURE: CPT | Performed by: STUDENT IN AN ORGANIZED HEALTH CARE EDUCATION/TRAINING PROGRAM

## 2023-03-15 PROCEDURE — 80202 ASSAY OF VANCOMYCIN: CPT | Performed by: INTERNAL MEDICINE

## 2023-03-15 PROCEDURE — 63600175 PHARM REV CODE 636 W HCPCS: Performed by: STUDENT IN AN ORGANIZED HEALTH CARE EDUCATION/TRAINING PROGRAM

## 2023-03-15 PROCEDURE — A4216 STERILE WATER/SALINE, 10 ML: HCPCS | Performed by: STUDENT IN AN ORGANIZED HEALTH CARE EDUCATION/TRAINING PROGRAM

## 2023-03-15 PROCEDURE — 25000003 PHARM REV CODE 250: Performed by: INTERNAL MEDICINE

## 2023-03-15 PROCEDURE — 11000001 HC ACUTE MED/SURG PRIVATE ROOM

## 2023-03-15 PROCEDURE — 36415 COLL VENOUS BLD VENIPUNCTURE: CPT | Performed by: INTERNAL MEDICINE

## 2023-03-15 PROCEDURE — 25000003 PHARM REV CODE 250

## 2023-03-15 PROCEDURE — 80048 BASIC METABOLIC PNL TOTAL CA: CPT | Performed by: STUDENT IN AN ORGANIZED HEALTH CARE EDUCATION/TRAINING PROGRAM

## 2023-03-15 RX ORDER — INSULIN ASPART 100 [IU]/ML
15 INJECTION, SOLUTION INTRAVENOUS; SUBCUTANEOUS
Status: DISCONTINUED | OUTPATIENT
Start: 2023-03-15 | End: 2023-03-22 | Stop reason: HOSPADM

## 2023-03-15 RX ADMIN — HYDRALAZINE HYDROCHLORIDE 25 MG: 25 TABLET, FILM COATED ORAL at 08:03

## 2023-03-15 RX ADMIN — INSULIN ASPART 20 UNITS: 100 INJECTION, SOLUTION INTRAVENOUS; SUBCUTANEOUS at 12:03

## 2023-03-15 RX ADMIN — HYDROCHLOROTHIAZIDE 25 MG: 25 TABLET ORAL at 08:03

## 2023-03-15 RX ADMIN — HYDRALAZINE HYDROCHLORIDE 25 MG: 25 TABLET, FILM COATED ORAL at 09:03

## 2023-03-15 RX ADMIN — CEFTRIAXONE SODIUM 2 G: 2 INJECTION, POWDER, FOR SOLUTION INTRAMUSCULAR; INTRAVENOUS at 09:03

## 2023-03-15 RX ADMIN — APIXABAN 5 MG: 5 TABLET, FILM COATED ORAL at 08:03

## 2023-03-15 RX ADMIN — ATORVASTATIN CALCIUM 80 MG: 40 TABLET, FILM COATED ORAL at 08:03

## 2023-03-15 RX ADMIN — APIXABAN 5 MG: 5 TABLET, FILM COATED ORAL at 09:03

## 2023-03-15 RX ADMIN — MUPIROCIN: 20 OINTMENT TOPICAL at 09:03

## 2023-03-15 RX ADMIN — OXYCODONE HYDROCHLORIDE AND ACETAMINOPHEN 500 MG: 500 TABLET ORAL at 08:03

## 2023-03-15 RX ADMIN — PANTOPRAZOLE SODIUM 40 MG: 40 TABLET, DELAYED RELEASE ORAL at 08:03

## 2023-03-15 RX ADMIN — ISOSORBIDE DINITRATE 10 MG: 10 TABLET ORAL at 09:03

## 2023-03-15 RX ADMIN — MUPIROCIN: 20 OINTMENT TOPICAL at 08:03

## 2023-03-15 RX ADMIN — VANCOMYCIN HYDROCHLORIDE 750 MG: 750 INJECTION, POWDER, LYOPHILIZED, FOR SOLUTION INTRAVENOUS at 01:03

## 2023-03-15 RX ADMIN — NIFEDIPINE 60 MG: 30 TABLET, FILM COATED, EXTENDED RELEASE ORAL at 08:03

## 2023-03-15 RX ADMIN — SODIUM CHLORIDE, PRESERVATIVE FREE 10 ML: 5 INJECTION INTRAVENOUS at 05:03

## 2023-03-15 RX ADMIN — VANCOMYCIN HYDROCHLORIDE 750 MG: 750 INJECTION, POWDER, LYOPHILIZED, FOR SOLUTION INTRAVENOUS at 02:03

## 2023-03-15 RX ADMIN — HYDRALAZINE HYDROCHLORIDE 25 MG: 25 TABLET, FILM COATED ORAL at 02:03

## 2023-03-15 RX ADMIN — INSULIN DETEMIR 35 UNITS: 100 INJECTION, SOLUTION SUBCUTANEOUS at 09:03

## 2023-03-15 RX ADMIN — TAMSULOSIN HYDROCHLORIDE 0.4 MG: 0.4 CAPSULE ORAL at 08:03

## 2023-03-15 RX ADMIN — ASPIRIN 81 MG: 81 TABLET, COATED ORAL at 08:03

## 2023-03-15 RX ADMIN — SODIUM CHLORIDE, PRESERVATIVE FREE 10 ML: 5 INJECTION INTRAVENOUS at 12:03

## 2023-03-15 RX ADMIN — CLOPIDOGREL BISULFATE 75 MG: 75 TABLET ORAL at 08:03

## 2023-03-15 RX ADMIN — GABAPENTIN 200 MG: 100 CAPSULE ORAL at 09:03

## 2023-03-15 RX ADMIN — LOSARTAN POTASSIUM 100 MG: 50 TABLET, FILM COATED ORAL at 08:03

## 2023-03-15 RX ADMIN — METRONIDAZOLE 500 MG: 500 TABLET ORAL at 09:03

## 2023-03-15 RX ADMIN — GABAPENTIN 200 MG: 100 CAPSULE ORAL at 08:03

## 2023-03-15 RX ADMIN — INSULIN ASPART 20 UNITS: 100 INJECTION, SOLUTION INTRAVENOUS; SUBCUTANEOUS at 08:03

## 2023-03-15 RX ADMIN — ISOSORBIDE DINITRATE 10 MG: 10 TABLET ORAL at 08:03

## 2023-03-15 RX ADMIN — METRONIDAZOLE 500 MG: 500 TABLET ORAL at 05:03

## 2023-03-15 RX ADMIN — METRONIDAZOLE 500 MG: 500 TABLET ORAL at 02:03

## 2023-03-15 RX ADMIN — INSULIN ASPART 15 UNITS: 100 INJECTION, SOLUTION INTRAVENOUS; SUBCUTANEOUS at 05:03

## 2023-03-15 RX ADMIN — ISOSORBIDE DINITRATE 10 MG: 10 TABLET ORAL at 02:03

## 2023-03-15 NOTE — PROGRESS NOTES
LEW communicated with Elba - pt's niece Laquita can't come to help with IV abx more than once/day   - the Vanc q 12 will be problematic as she has to come to pt's home to assist -- she can't come more than once a day - pt isn't a good candidate for self - administration. As it seems he will need Vanc and Ceftriaxone - LEW is  working on plcmt for pt.     SNF referrals to be sent per [facility] to [facility], [facility] and other area facilities.    CARL GOMEZ asked to order pt/ot        03-Jan-2023 13:15

## 2023-03-15 NOTE — PROGRESS NOTES
Pharmacokinetic Assessment Follow Up: IV Vancomycin    Vancomycin serum concentration assessment(s):    The trough level was drawn correctly and can be used to guide therapy at this time. The measurement is within the desired definitive target range of 15 to 20 mcg/mL.    Vancomycin Regimen Plan:    Continue regimen to Vancomycin 750 mg IV every 12 hours with next serum trough concentration measured at 1300 prior to 4th dose on 3/17    Drug levels (last 3 results):  Recent Labs   Lab Result Units 03/13/23  0428 03/14/23  1109 03/15/23  1344   Vancomycin, Random ug/mL 18.1 14.0  --    Vancomycin-Trough ug/mL  --   --  15.6       Pharmacy will continue to follow and monitor vancomycin.    Please contact pharmacy at extension [phone] for questions regarding this assessment.    Thank you for the consult,   Raymond White       Patient brief summary:  Saji Castañeda is a 73 y.o. male initiated on antimicrobial therapy with IV Vancomycin for treatment of bone/joint infection    The patient's current regimen is vancomycin 750mg Q12h    Drug Allergies:   Review of patient's allergies indicates:  No Known Allergies    Actual Body Weight:   122.9kg    Renal Function:   Estimated Creatinine Clearance: 52.5 mL/min (A) (based on SCr of 1.6 mg/dL (H)).,     Dialysis Method (if applicable):      CBC (last 72 hours):  Recent Labs   Lab Result Units 03/13/23 0428 03/14/23  0813   WBC K/uL 8.03 7.58   Hemoglobin g/dL 8.1* 8.4*   Hematocrit % 25.6* 27.4*   Platelets K/uL 393 426   Gran % % 73.7* 69.3   Lymph % % 11.0* 14.4*   Mono % % 9.5 9.4   Eosinophil % % 4.7 6.2   Basophil % % 0.5 0.3   Differential Method  Automated Automated       Metabolic Panel (last 72 hours):  Recent Labs   Lab Result Units 03/13/23 0428 03/14/23  0813 03/15/23  0759   Sodium mmol/L 135* 134* 134*   Potassium mmol/L 4.0 4.1 4.4   Chloride mmol/L 100 100 100   CO2 mmol/L 25 25 25   Glucose mg/dL 172* 145* 128*   BUN mg/dL 25* 29* 30*   Creatinine mg/dL 1.7*  1.6* 1.6*   Albumin g/dL 1.9* 1.9*  --    Total Bilirubin mg/dL 0.2 0.2  --    Alkaline Phosphatase U/L 54* 52*  --    AST U/L 22 16  --    ALT U/L 18 14  --    Magnesium mg/dL 2.2  --   --    Phosphorus mg/dL 3.4  --   --        Vancomycin Administrations:  vancomycin given in the last 96 hours                     vancomycin 750 mg in dextrose 5 % (D5W) 250 mL IVPB (Vial-Mate) (mg) 750 mg New Bag 03/15/23 1421     750 mg New Bag  0110     750 mg New Bag 03/14/23 1421    vancomycin 1,500 mg in dextrose 5 % (D5W) 250 mL IVPB (Vial-Mate) (mg) 1,500 mg New Bag 03/13/23 1132    vancomycin 1,250 mg in dextrose 5 % (D5W) 250 mL IVPB (Vial-Mate) (mg) 1,250 mg New Bag 03/11/23 2302                    Microbiologic Results:  Microbiology Results (last 7 days)       Procedure Component Value Units Date/Time    Blood culture [[id number]] Collected: 03/12/23 0648    Order Status: Completed Specimen: Blood Updated: 03/15/23 1412     Blood Culture, Routine No Growth to date      No Growth to date      No Growth to date      No Growth to date    Blood culture [[id number]] Collected: 03/12/23 0647    Order Status: Completed Specimen: Blood from Antecubital, Left Updated: 03/15/23 1412     Blood Culture, Routine No Growth to date      No Growth to date      No Growth to date      No Growth to date    Culture, Anaerobe [[id number]] Collected: 03/10/23 1741    Order Status: Completed Specimen: Wound from Leg, Left Updated: 03/15/23 1103     Anaerobic Culture No anaerobes isolated    Blood culture #2 **CANNOT BE ORDERED STAT** [[id number]] Collected: 03/10/23 0608    Order Status: Completed Specimen: Blood Updated: 03/14/23 1612     Blood Culture, Routine No Growth to date      No Growth to date      No Growth to date      No Growth to date      No Growth to date    Aerobic culture [[id number]]  (Abnormal)  (Susceptibility) Collected: 03/10/23 1741    Order Status: Completed Specimen: Wound from Leg, Left Updated: 03/13/23 1219     Aerobic  Bacterial Culture PROTEUS MIRABILIS  Few  No other significant isolate      Narrative:      Left heel wound    Blood culture #1 **CANNOT BE ORDERED STAT** [[id number]]  (Abnormal)  (Susceptibility) Collected: 03/09/23 1525    Order Status: Completed Specimen: Blood from Peripheral, Hand, Right Updated: 03/13/23 1013     Blood Culture, Routine Gram stain aer bottle: Gram positive cocci in clusters resembling Staph      Results called to and read back by:Hamlet Mullen RN 03/10/2023  20:36      STAPHYLOCOCCUS SPECIES  Further identified as Staphylococcus pasteuri      MRSA/SA Rapid ID by PCR from Blood culture [[id number]] Collected: 03/09/23 1525    Order Status: Completed Updated: 03/10/23 2214     Staph aureus ID by PCR Negative     MRSA ID by PCR Negative    Aerobic culture [[id number]] Collected: 03/10/23 1741    Order Status: Canceled Specimen: Wound from Leg, Left     Culture, Anaerobe [[id number]] Collected: 03/10/23 1741    Order Status: Canceled Specimen: Wound from Leg, Left     Aerobic culture [[id number]] Collected: 03/10/23 1741    Order Status: Canceled Specimen: Wound from Leg, Left

## 2023-03-15 NOTE — PROGRESS NOTES
"Surgery follow up  BP (!) 148/67 (BP Location: Right arm, Patient Position: Sitting)   Pulse 63   Temp 97.3 °F (36.3 °C) (Oral)   Resp 18   Ht 5' 9" (1.753 m)   Wt 122.9 kg (271 lb)   SpO2 95%   BMI 40.02 kg/m²   I/O last 3 completed shifts:  In: 480 [P.O.:480]  Out: 300 [Urine:300]  No intake/output data recorded.    Recent Results (from the past 336 hour(s))   CBC auto differential    Collection Time: 03/14/23  8:13 AM   Result Value Ref Range    WBC 7.58 3.90 - 12.70 K/uL    Hemoglobin 8.4 (L) 14.0 - 18.0 g/dL    Hematocrit 27.4 (L) 40.0 - 54.0 %    Platelets 426 150 - 450 K/uL   CBC auto differential    Collection Time: 03/13/23  4:28 AM   Result Value Ref Range    WBC 8.03 3.90 - 12.70 K/uL    Hemoglobin 8.1 (L) 14.0 - 18.0 g/dL    Hematocrit 25.6 (L) 40.0 - 54.0 %    Platelets 393 150 - 450 K/uL   CBC with Automated Differential    Collection Time: 03/12/23  6:47 AM   Result Value Ref Range    WBC 8.91 3.90 - 12.70 K/uL    Hemoglobin 8.7 (L) 14.0 - 18.0 g/dL    Hematocrit 27.5 (L) 40.0 - 54.0 %    Platelets 409 150 - 450 K/uL     Defer wound care to Dr Wilson .  "

## 2023-03-15 NOTE — PROGRESS NOTES
RSW met with patient to discuss discharge and additional patient barriers to care. Pt identified no additional social barriers to care. Per pt, pt is not in need of resources at this time.    The following were addressed during this visit:  -Complete follow-up with patient    LARON Albert

## 2023-03-15 NOTE — PLAN OF CARE
CM met with pt per [facility] [facility] -   CM spoke with Elsy at [facility] - she is aware that pt may d/c today - updated notes sent - final ID recc P / HH order pending   Per pt - he isn't sure that his sister Kandy can assist with home IV abx administration.   Pt states he is willing to go to SNF post d/c.      CM called and spoke at length with sister Kandy - pt lives with her and brother Noe -- per Ms. Gaitan - she is too ill to help with IV abx administration.  At Ms. Gaitan's suggestion - CM called pt's sister - January  [phone] -- she is open to assisting with IV abx as she has done it in the past but will be going out of town Saturday.   She is going to speak with pt's brother who lives with pt to see if he is open to instruction.  She feels Mr. Liu is capable of assisting.    CM spoke with Elba - explained that CM is still establishing who the family administration  will be.   CM will update Elba.    Kandy Castañeda  Sister  [phone]  Notify on admission  January Nicholson  Sister  [phone]   Notify on admission  Noe Nicholson  Brother  [phone]  LEW rec'd a call from tommy Coelho  [phone]  - she has assisted with home IV abx in the past and is willing to help pt.   CM gave niabel's # to Elba with [employer] asking that she contact niabel for education.  Ms. Coelho states she has transportation to hosp.        03/15/23 1147   Post-Acute Status   Post-Acute Authorization Home Health;IV Infusion  ([facility]; [facility])   Home Health Status Referrals Sent   IV Infusion Status Referral(s) sent   Discharge Delays (!) Other  (final ID recc)   Discharge Plan   Discharge Plan A Home;Home with family;Home Health   Discharge Plan B Skilled Nursing Facility

## 2023-03-15 NOTE — PROGRESS NOTES
"[facility] Medicine Progress Note    Primary Team: hospitals Hospitalist Team B  Attending Physician: Vinod Elise MD  Resident: Seymour Yan MD  Intern: Vi Bains MD (Nak)    Hospital Day: 4 days    Chief Complaint: Chronic BLE wounds w/ superimposed cellulitis worsening over 3wks    Subjective:   NAEON. Afebrile w/ stable vitals on room air. Pt seen by writer this morning. No concerns voiced about PICC line sight & BLE wounds. Pt denied fevers, chills, N/V, SOB, chest pain, abd pain, BLE pains, or numbness/weakness.     Review of Systems   All other systems reviewed and are negative.      Objective:   Last 24 Hour Vital Signs:  BP  Min: 118/58  Max: 167/72  Temp  Av.1 °F (36.7 °C)  Min: 97.8 °F (36.6 °C)  Max: 98.3 °F (36.8 °C)  Pulse  Av.1  Min: 49  Max: 83  Resp  Av  Min: 18  Max: 18  SpO2  Av %  Min: 94 %  Max: 98 %    Intake/Output Summary (Last 24 hours) at 3/15/2023 0722  Last data filed at 3/15/2023 0546  Gross per 24 hour   Intake 480 ml   Output 300 ml   Net 180 ml       Physical Examination:    Physical Exam   Constitutional: He is oriented to person, place, and time. normal appearance. He appears obese.  Non-toxic appearance. No distress. He does not appear ill.   HENT:   Mouth/Throat: Mucous membranes are moist. Oropharynx is clear.   Eyes: Pupils are equal, round, and reactive to light.   Cardiovascular: Normal rate, regular rhythm, normal heart sounds and normal pulses.  Pulmonary:     Effort: Pulmonary effort is normal.      Breath sounds: Normal breath sounds.   Abdominal: Soft. Normal appearance and bowel sounds are normal.   Musculoskeletal:         General: Swelling and deformity present; stable.      Comments: See media tab on 3/9/23   Neurological: He is alert and oriented to person, place, and time.   Skin: Skin is warm and dry. PICC line site in LUE c/d/i  See media tab on 3/9/23   Psychiatric: His behavior is normal.      Laboratory:  Recent Labs   Lab " 03/09/23  1618 03/10/23  0608 03/12/23  0647 03/13/23  0428 03/14/23  0813   WBC  --    < > 8.91 8.03 7.58   HGB  --    < > 8.7* 8.1* 8.4*   HCT  --    < > 27.5* 25.6* 27.4*   PLT  --    < > 409 393 426   MCV  --    < > 83 84 84   RDW  --    < > 15.7* 15.7* 15.7*      < > 134* 135* 134*   K 3.8   < > 3.9 4.0 4.1      < > 99 100 100   CO2 25   < > 25 25 25   BUN 18   < > 23 25* 29*   CREATININE 1.4   < > 1.7* 1.7* 1.6*      < > 167* 172* 145*   PROT 8.4  --   --  7.3 7.5   ALBUMIN 2.4*  --   --  1.9* 1.9*   BILITOT 0.4  --   --  0.2 0.2   AST 32  --   --  22 16   ALKPHOS 71  --   --  54* 52*   ALT 28  --   --  18 14    < > = values in this interval not displayed.       Microbiology Results (last 7 days)       Procedure Component Value Units Date/Time    Blood culture #2 **CANNOT BE ORDERED STAT** [[id number]] Collected: 03/10/23 0608    Order Status: Completed Specimen: Blood Updated: 03/14/23 1612     Blood Culture, Routine No Growth to date      No Growth to date      No Growth to date      No Growth to date      No Growth to date    Blood culture [[id number]] Collected: 03/12/23 0647    Order Status: Completed Specimen: Blood from Antecubital, Left Updated: 03/14/23 1412     Blood Culture, Routine No Growth to date      No Growth to date      No Growth to date    Blood culture [[id number]] Collected: 03/12/23 0648    Order Status: Completed Specimen: Blood Updated: 03/14/23 1412     Blood Culture, Routine No Growth to date      No Growth to date      No Growth to date    Culture, Anaerobe [[id number]] Collected: 03/10/23 3371    Order Status: Completed Specimen: Wound from Leg, Left Updated: 03/14/23 1057     Anaerobic Culture Culture in progress    Aerobic culture [[id number]]  (Abnormal)  (Susceptibility) Collected: 03/10/23 0290    Order Status: Completed Specimen: Wound from Leg, Left Updated: 03/13/23 1219     Aerobic Bacterial Culture PROTEUS MIRABILIS  Few  No other significant isolate       Narrative:      Left heel wound    Blood culture #1 **CANNOT BE ORDERED STAT** [[id number]]  (Abnormal)  (Susceptibility) Collected: 03/09/23 1525    Order Status: Completed Specimen: Blood from Peripheral, Hand, Right Updated: 03/13/23 1013     Blood Culture, Routine Gram stain aer bottle: Gram positive cocci in clusters resembling Staph      Results called to and read back by:Hamlet Mullen RN 03/10/2023  20:36      STAPHYLOCOCCUS SPECIES  Further identified as Staphylococcus pasteuri      MRSA/SA Rapid ID by PCR from Blood culture [[id number]] Collected: 03/09/23 1525    Order Status: Completed Updated: 03/10/23 2214     Staph aureus ID by PCR Negative     MRSA ID by PCR Negative    Aerobic culture [[id number]] Collected: 03/10/23 1741    Order Status: Canceled Specimen: Wound from Leg, Left     Culture, Anaerobe [[id number]] Collected: 03/10/23 1741    Order Status: Canceled Specimen: Wound from Leg, Left     Aerobic culture [[id number]] Collected: 03/10/23 1741    Order Status: Canceled Specimen: Wound from Leg, Left           I have personally reviewed the above laboratory studies.     Imaging:  EKG (my interpretation): no indication for EKG this admit at this time    X-Ray Ankle Complete Right    Result Date: 3/9/2023  EXAMINATION: XR ANKLE COMPLETE 3 VIEW RIGHT CLINICAL HISTORY: Unspecified open wound, unspecified lower leg, initial encounter TECHNIQUE: AP, lateral, and oblique images of the right ankle were performed. COMPARISON: Right foot series 02/23/2022 FINDINGS: Chronic appearing deformity of the mid to distal femoral shaft presumably sequela of remote trauma.  No acute displaced fracture, dislocation or destructive osseous process.  There is DJD. Nonspecific soft tissue swelling about the distal right lower leg, ankle and dorsal hindfoot particular along the anterior and lateral aspect likely representing cellulitis.  No large soft tissue defect or subcutaneous emphysema seen.  Scattered atherosclerotic  vascular and also nonspecific soft tissue calcifications noted.  No radiodense retained foreign body.  Small enthesophyte at the Achilles insertion site.     Distal right lower leg, ankle and hindfoot diffuse nonspecific soft tissue swelling from edema or cellulitis with superimposed more prominent soft tissue swelling along the anterolateral aspect of the distal lower leg.  Clinical correlation advised. Additional chronic appearing findings as above. Electronically signed by: Juan Hernandez MD Date:    03/09/2023 Time:    16:51    MRI Ankle Without Contrast Right    Result Date: 3/9/2023  EXAMINATION: MRI ANKLE WITHOUT CONTRAST RIGHT CLINICAL HISTORY: Soft tissue infection suspected, ankle, xray done; TECHNIQUE: MRI ankle performed per routine protocol without contrast. COMPARISON: MRI right lower extremity from 09/01/2006.  MRI the contralateral ankle from 02/19/2021.  Radiographs of the right ankle from 03/09/2023 taken at 1543. FINDINGS: Bone: There is no evidence of acute osteomyelitis.  Marrow signal is normal.  No marrow edema or T1 hypointense signal. Articulations: There is cartilage loss of the joint between the navicular and the cuneiform bones with adjacent endplate edema and cystic change in the distal pole of the navicular.  Remaining articulations are grossly unremarkable.  There is no evidence of a large joint effusion. Tendons: Flexor, extensor, and peroneal tendons are grossly intact and demonstrate normal signal.  There is no evidence of tenosynovitis.  The Achilles tendon is intact. Ligaments: The anterior inferior and posteroinferior tibiofibular ligaments are intact.  The anterior talofibular ligament is intact.  The calcaneal fibular and posterior talofibular ligaments are intact.  The deltoid and spring ligaments are intact.  The Lisfranc ligament is not entirely included in the field of view. Soft tissues: There is a large dorsal soft tissue ulcer with exposure of the extensor hallucis tendon.   There is circumferential soft tissue edema the ankle extending into the dorsum the hindfoot.     1. No acute osteomyelitis. 2. Large soft tissue ulcer of the dorsal aspect of the hindfoot and ankle with exposure of the extensor hallucis tendon.  Surrounding soft tissue edema and cellulitis as above. Electronically signed by: Zane Galeana Date:    03/09/2023 Time:    19:45       Current Medications:     Scheduled:   apixaban  5 mg Oral BID    ascorbic acid (vitamin C)  500 mg Oral Daily    aspirin  81 mg Oral Daily    atorvastatin  80 mg Oral Daily    cefTRIAXone (ROCEPHIN) IVPB  2 g Intravenous Q24H    clopidogreL  75 mg Oral Daily    gabapentin  200 mg Oral BID    hydrALAZINE  25 mg Oral TID    hydroCHLOROthiazide  25 mg Oral Daily    insulin aspart U-100  20 Units Subcutaneous TID WM    insulin detemir U-100  35 Units Subcutaneous QHS    isosorbide dinitrate  10 mg Oral TID    losartan  100 mg Oral Daily    metroNIDAZOLE  500 mg Oral Q8H    mupirocin   Nasal BID    NIFEdipine  60 mg Oral Daily    pantoprazole  40 mg Oral Daily    sodium chloride 0.9%  10 mL Intravenous Q6H    tamsulosin  0.4 mg Oral Daily    vancomycin (VANCOCIN) IVPB  750 mg Intravenous Q12H         Infusions:       PRN:  sodium chloride 0.9%, dextrose 10%, dextrose 10%, dextrose, dextrose, glucagon (human recombinant), insulin aspart U-100, naloxone, sodium chloride 0.9%, Flushing PICC Protocol **AND** sodium chloride 0.9% **AND** sodium chloride 0.9%, Pharmacy to dose Vancomycin consult **AND** vancomycin - pharmacy to dose    Antibiotics and Day Number of Therapy:  Antibiotics (From admission, onward)      Start     Stop Route Frequency Ordered    03/14/23 2230  cefTRIAXone (ROCEPHIN) 2 g in dextrose 5 % in water (D5W) 5 % 50 mL IVPB (MB+)         -- IV Every 24 hours (non-standard times) 03/14/23 1428    03/14/23 1400  vancomycin 750 mg in dextrose 5 % (D5W) 250 mL IVPB (Vial-Mate)         -- IV Every 12 hours (non-standard times) 03/14/23  1257    03/13/23 0900  mupirocin 2 % ointment         03/18 0859 Nasl 2 times daily 03/13/23 0543    03/10/23 2200  metroNIDAZOLE tablet 500 mg         -- Oral Every 8 hours 03/10/23 1627    03/10/23 1725  vancomycin - pharmacy to dose  (vancomycin IVPB)        See Ham for full Linked Orders Report.    -- IV pharmacy to manage frequency 03/10/23 1627          Assessment:     Saji Castañeda is a 73 y.o. male with a PMHx of chronic LLE osteomyelitis,T2DM c/b neuropathy, PVD, DVT in RUE (June 2022), HTN, CKD, Mobitz Type 1, NSTEMI (9/2022) presenting with worsening BLE wounds that has been getting worse over the past 3 wks. Pt will be admitted to [facility] for the evaluation and management of chronic BLE wounds w/ superimposed cellulitis    Plan:     Chronic BLE wounds w/ superimposed cellulitis  Per Wound Care, pt has new R anterior ankle wounds w/ tendon exposure & a known L plantar foot wound w/ palpable bone in L heel from previous visit. Pt sent to ED for possible IV abx +/- debridement.  Afebrile w/ no elevations in WBC and ANC, however cannot r/o systemic infxn given pt's poor immune status 2/2 long T2DM hx  Consulted ID for abx management/regiment given hx of receiving dapto. per ID:  PICC line placed on 3/13 in E: continueVanc & Ceftriaxone IV along w/ Flagyl PO for ~6wks; end of IV abx date: 4/20/23  Weekly outpt labs on Mon or Tues while on IV abx:  CBC, CMP or BMP, ESR, CRP, CPK, and Vanc trough (goal: 15-20)  Appreciate recs  MRI R Ankle showed no acute osteomyelitis but showed large soft tissue ulcer of the dorsal aspect of the hindfoot and ankle with exposure of the extensor hallucis tendon w/ surrounding soft tissue edema and cellulitis.   BLE U/S showed no evidence of vasc occlusion/stenosis in RLE/LLE arterial system, but does display findings suggestive of vasc ds  MRI L Foot showed changes of osteomyelitis involving the posterior calcaneus where there is a large soft tissue defect.  It is  similar to 02/19/2021 examination.  Consulted Podiatry for L heel osteo eval; per Podiatry:  Subacute osteomyelitis of left foot  MRI reviewed with osteomyelitis to left heel. Heel wound without acute signs of infection, site with bone exposure. Bone debridement performed today and sent for culture, see procedure note. Continue z-flex boots to both lower extremities. Nursing orders in for wound care. Antibiotic management per Infectious Disease   Acute osteomyelitis of right ankle  Malodorous acutely infected ulcer to right anterior ankle which probes to level of bone and deep to ankle joint. Wound debridement to include soft tissue, tendon and bone, sent for culture. General Surgery consult placed for further management and consideration of BKA. Podiatry will sign off  Appreciate recs  Consulted Surgery for BKA eval; per Surgery:  Dr Almnazar has been discussing w/ pt about BKA however pt has been refusing so far.  Continue IV Abx and local wound care  Appreciate recs  3/10 Aerobic B-Cx grew Proteus Mirabilis. 3/12 BC x2 NGTD  Pending 3/10 Bone Cx    T2DM w/ Neuropathy  Last A1c 9.9 about 5 months ago  Home Metformin and Rosuvastatin w/ insulin  POCT glucose checks  A1c 8.4  Currently on Atorvastatin and SSI  Continue home Gabapentin for neuropathy  Continue home Lasix and Tamsulosin for hx inability to empty bladder 2/2 neuropathy     PAD w/ hx of DVT in RUE  DVT in RUE in June 2022  DAPT  Continue Eliquis     HTN  Continue home Hydralazine, Isodil, Losartan, and Nifedipine.     Hx of Type 1 Mobitz  Avoid BB  Unremarkable ROS and PE suggestive of cardiac process at this time  CTM     Hx of NSTEMI  Hx of NSTEMI in 9/2022  TTE during NSTEMI work-up showed LVEF ~60% w/ notable Pulm HTN (PA systolic pressure 41mmHg)  TTE 3/11 showed LVEF ~70% w/ no other acute/significant changes from previous  Unremarkable ROS and PE suggestive of cardiac process at this time  CTM     Ppx: Apixaban  Diet: Diabetic  Code: Full    "  Disposition: pending improvements w/ BLE wound management and appropriate vanc trough levels    Vi Bains MD (Nak)  [facility] Internal Medicine, PGY-1    [facility] Medicine Hospitalist Pager numbers:   [facility] Hospitalist Medicine Team A (Lorne/Riki): [phone]  [facility] Hospitalist Medicine Team B (Arik/Lucio):  [phone]      "

## 2023-03-15 NOTE — PLAN OF CARE
Kandy Garry  Sister  [phone]  Notify on admission  January Nicholson  Sister  [phone]  Notify on admission  Noe Nicholson  Brother  [phone]   03/15/23 1134   Discharge Planning   Assessment Type Discharge Planning Brief Assessment

## 2023-03-16 LAB
POCT GLUCOSE: 187 MG/DL (ref 70–110)
POCT GLUCOSE: 204 MG/DL (ref 70–110)
POCT GLUCOSE: 228 MG/DL (ref 70–110)
POCT GLUCOSE: 229 MG/DL (ref 70–110)

## 2023-03-16 PROCEDURE — 25000003 PHARM REV CODE 250: Performed by: INTERNAL MEDICINE

## 2023-03-16 PROCEDURE — 63600175 PHARM REV CODE 636 W HCPCS: Performed by: INTERNAL MEDICINE

## 2023-03-16 PROCEDURE — 99233 SBSQ HOSP IP/OBS HIGH 50: CPT | Mod: ,,, | Performed by: STUDENT IN AN ORGANIZED HEALTH CARE EDUCATION/TRAINING PROGRAM

## 2023-03-16 PROCEDURE — 11000001 HC ACUTE MED/SURG PRIVATE ROOM

## 2023-03-16 PROCEDURE — 97165 OT EVAL LOW COMPLEX 30 MIN: CPT

## 2023-03-16 PROCEDURE — 63600175 PHARM REV CODE 636 W HCPCS: Performed by: STUDENT IN AN ORGANIZED HEALTH CARE EDUCATION/TRAINING PROGRAM

## 2023-03-16 PROCEDURE — 99233 PR SUBSEQUENT HOSPITAL CARE,LEVL III: ICD-10-PCS | Mod: ,,, | Performed by: STUDENT IN AN ORGANIZED HEALTH CARE EDUCATION/TRAINING PROGRAM

## 2023-03-16 PROCEDURE — 97535 SELF CARE MNGMENT TRAINING: CPT

## 2023-03-16 PROCEDURE — 25000003 PHARM REV CODE 250

## 2023-03-16 PROCEDURE — 25000003 PHARM REV CODE 250: Performed by: STUDENT IN AN ORGANIZED HEALTH CARE EDUCATION/TRAINING PROGRAM

## 2023-03-16 PROCEDURE — 97161 PT EVAL LOW COMPLEX 20 MIN: CPT

## 2023-03-16 PROCEDURE — 97530 THERAPEUTIC ACTIVITIES: CPT

## 2023-03-16 PROCEDURE — A4216 STERILE WATER/SALINE, 10 ML: HCPCS | Performed by: STUDENT IN AN ORGANIZED HEALTH CARE EDUCATION/TRAINING PROGRAM

## 2023-03-16 RX ORDER — METRONIDAZOLE 500 MG/1
500 TABLET ORAL EVERY 8 HOURS
Qty: 108 TABLET | Refills: 0 | Status: SHIPPED | OUTPATIENT
Start: 2023-03-16 | End: 2023-04-22

## 2023-03-16 RX ADMIN — APIXABAN 5 MG: 5 TABLET, FILM COATED ORAL at 08:03

## 2023-03-16 RX ADMIN — ATORVASTATIN CALCIUM 80 MG: 40 TABLET, FILM COATED ORAL at 08:03

## 2023-03-16 RX ADMIN — INSULIN ASPART 15 UNITS: 100 INJECTION, SOLUTION INTRAVENOUS; SUBCUTANEOUS at 04:03

## 2023-03-16 RX ADMIN — HYDRALAZINE HYDROCHLORIDE 25 MG: 25 TABLET, FILM COATED ORAL at 02:03

## 2023-03-16 RX ADMIN — SODIUM CHLORIDE, PRESERVATIVE FREE 10 ML: 5 INJECTION INTRAVENOUS at 06:03

## 2023-03-16 RX ADMIN — INSULIN ASPART 4 UNITS: 100 INJECTION, SOLUTION INTRAVENOUS; SUBCUTANEOUS at 11:03

## 2023-03-16 RX ADMIN — METRONIDAZOLE 500 MG: 500 TABLET ORAL at 05:03

## 2023-03-16 RX ADMIN — HYDRALAZINE HYDROCHLORIDE 25 MG: 25 TABLET, FILM COATED ORAL at 08:03

## 2023-03-16 RX ADMIN — OXYCODONE HYDROCHLORIDE AND ACETAMINOPHEN 500 MG: 500 TABLET ORAL at 08:03

## 2023-03-16 RX ADMIN — METRONIDAZOLE 500 MG: 500 TABLET ORAL at 09:03

## 2023-03-16 RX ADMIN — SODIUM CHLORIDE, PRESERVATIVE FREE 10 ML: 5 INJECTION INTRAVENOUS at 12:03

## 2023-03-16 RX ADMIN — CLOPIDOGREL BISULFATE 75 MG: 75 TABLET ORAL at 08:03

## 2023-03-16 RX ADMIN — PANTOPRAZOLE SODIUM 40 MG: 40 TABLET, DELAYED RELEASE ORAL at 08:03

## 2023-03-16 RX ADMIN — INSULIN ASPART 2 UNITS: 100 INJECTION, SOLUTION INTRAVENOUS; SUBCUTANEOUS at 08:03

## 2023-03-16 RX ADMIN — INSULIN ASPART 2 UNITS: 100 INJECTION, SOLUTION INTRAVENOUS; SUBCUTANEOUS at 04:03

## 2023-03-16 RX ADMIN — INSULIN DETEMIR 35 UNITS: 100 INJECTION, SOLUTION SUBCUTANEOUS at 08:03

## 2023-03-16 RX ADMIN — GABAPENTIN 200 MG: 100 CAPSULE ORAL at 08:03

## 2023-03-16 RX ADMIN — ASPIRIN 81 MG: 81 TABLET, COATED ORAL at 08:03

## 2023-03-16 RX ADMIN — VANCOMYCIN HYDROCHLORIDE 750 MG: 750 INJECTION, POWDER, LYOPHILIZED, FOR SOLUTION INTRAVENOUS at 03:03

## 2023-03-16 RX ADMIN — NIFEDIPINE 60 MG: 30 TABLET, FILM COATED, EXTENDED RELEASE ORAL at 08:03

## 2023-03-16 RX ADMIN — Medication 10 ML: at 05:03

## 2023-03-16 RX ADMIN — ISOSORBIDE DINITRATE 10 MG: 10 TABLET ORAL at 08:03

## 2023-03-16 RX ADMIN — INSULIN ASPART 15 UNITS: 100 INJECTION, SOLUTION INTRAVENOUS; SUBCUTANEOUS at 11:03

## 2023-03-16 RX ADMIN — METRONIDAZOLE 500 MG: 500 TABLET ORAL at 02:03

## 2023-03-16 RX ADMIN — HYDROCHLOROTHIAZIDE 25 MG: 25 TABLET ORAL at 08:03

## 2023-03-16 RX ADMIN — CEFTRIAXONE SODIUM 2 G: 2 INJECTION, POWDER, FOR SOLUTION INTRAMUSCULAR; INTRAVENOUS at 10:03

## 2023-03-16 RX ADMIN — MUPIROCIN: 20 OINTMENT TOPICAL at 08:03

## 2023-03-16 RX ADMIN — ISOSORBIDE DINITRATE 10 MG: 10 TABLET ORAL at 02:03

## 2023-03-16 RX ADMIN — INSULIN ASPART 15 UNITS: 100 INJECTION, SOLUTION INTRAVENOUS; SUBCUTANEOUS at 08:03

## 2023-03-16 RX ADMIN — TAMSULOSIN HYDROCHLORIDE 0.4 MG: 0.4 CAPSULE ORAL at 08:03

## 2023-03-16 RX ADMIN — INSULIN ASPART 4 UNITS: 100 INJECTION, SOLUTION INTRAVENOUS; SUBCUTANEOUS at 08:03

## 2023-03-16 RX ADMIN — VANCOMYCIN HYDROCHLORIDE 750 MG: 750 INJECTION, POWDER, LYOPHILIZED, FOR SOLUTION INTRAVENOUS at 02:03

## 2023-03-16 RX ADMIN — LOSARTAN POTASSIUM 100 MG: 50 TABLET, FILM COATED ORAL at 08:03

## 2023-03-16 NOTE — PROGRESS NOTES
"[facility] Medicine Progress Note    Primary Team: [facility] [place] Hospital[facility] Team B  Attending Physician: Vinod Elise MD  Resident: Seymour Yan MD  Intern: Vi Bains MD (Nak)    Hospital Day: 5 days    Chief Complaint: Chronic BLE wounds w/ superimposed cellulitis worsening over 3wks    Subjective:   NAEON. Afebrile w/ stable vitals on room air. Pt seen by writer this morning. No concerns voiced about PICC line sight & BLE wounds. Pt denied fevers, chills, N/V, SOB, chest pain, abd pain, BLE pains, or numbness/weakness. Pending discharge disposition.    Review of Systems   All other systems reviewed and are negative.      Objective:   Last 24 Hour Vital Signs:  BP  Min: 120/54  Max: 167/65  Temp  Av.9 °F (36.6 °C)  Min: 97.3 °F (36.3 °C)  Max: 98.7 °F (37.1 °C)  Pulse  Av.5  Min: 61  Max: 81  Resp  Av.8  Min: 17  Max: 18  SpO2  Av.2 %  Min: 90 %  Max: 97 %    Intake/Output Summary (Last 24 hours) at 3/16/2023 0823  Last data filed at 3/16/2023 0604  Gross per 24 hour   Intake 1141.84 ml   Output 2500 ml   Net -1358.16 ml       Physical Examination:    Physical Exam   Constitutional: He is oriented to person, place, and time. normal appearance. He appears obese.  Non-toxic appearance. No distress. He does not appear ill.   HENT:   Mouth/Throat: Mucous membranes are moist. Oropharynx is clear.   Eyes: Pupils are equal, round, and reactive to light.   Cardiovascular: Normal rate, regular rhythm, normal heart sounds and normal pulses.  Pulmonary:     Effort: Pulmonary effort is normal.      Breath sounds: Normal breath sounds.   Abdominal: Soft. Normal appearance and bowel sounds are normal.   Musculoskeletal:         General: Swelling and deformity present; stable.      Comments: See media tab on 3/9/23   Neurological: He is alert and oriented to person, place, and time.   Skin: Skin is warm and dry. PICC line site in LUE c/d/i  See media tab on 3/9/23   Psychiatric: His behavior is normal. "      Laboratory:  Recent Labs   Lab 03/09/23  1618 03/10/23  0608 03/12/23  0647 03/13/23  0428 03/14/23  0813 03/15/23  0759   WBC  --    < > 8.91 8.03 7.58  --    HGB  --    < > 8.7* 8.1* 8.4*  --    HCT  --    < > 27.5* 25.6* 27.4*  --    PLT  --    < > 409 393 426  --    MCV  --    < > 83 84 84  --    RDW  --    < > 15.7* 15.7* 15.7*  --       < > 134* 135* 134* 134*   K 3.8   < > 3.9 4.0 4.1 4.4      < > 99 100 100 100   CO2 25   < > 25 25 25 25   BUN 18   < > 23 25* 29* 30*   CREATININE 1.4   < > 1.7* 1.7* 1.6* 1.6*      < > 167* 172* 145* 128*   PROT 8.4  --   --  7.3 7.5  --    ALBUMIN 2.4*  --   --  1.9* 1.9*  --    BILITOT 0.4  --   --  0.2 0.2  --    AST 32  --   --  22 16  --    ALKPHOS 71  --   --  54* 52*  --    ALT 28  --   --  18 14  --     < > = values in this interval not displayed.       Microbiology Results (last 7 days)       Procedure Component Value Units Date/Time    Blood culture #2 **CANNOT BE ORDERED STAT** [[id number]] Collected: 03/10/23 0608    Order Status: Completed Specimen: Blood Updated: 03/15/23 1612     Blood Culture, Routine No growth after 5 days.    Blood culture [[id number]] Collected: 03/12/23 0648    Order Status: Completed Specimen: Blood Updated: 03/15/23 1412     Blood Culture, Routine No Growth to date      No Growth to date      No Growth to date      No Growth to date    Blood culture [[id number]] Collected: 03/12/23 0647    Order Status: Completed Specimen: Blood from Antecubital, Left Updated: 03/15/23 1412     Blood Culture, Routine No Growth to date      No Growth to date      No Growth to date      No Growth to date    Culture, Anaerobe [[id number]] Collected: 03/10/23 1741    Order Status: Completed Specimen: Wound from Leg, Left Updated: 03/15/23 1103     Anaerobic Culture No anaerobes isolated    Aerobic culture [[id number]]  (Abnormal)  (Susceptibility) Collected: 03/10/23 1741    Order Status: Completed Specimen: Wound from Leg, Left Updated:  03/13/23 1219     Aerobic Bacterial Culture PROTEUS MIRABILIS  Few  No other significant isolate      Narrative:      Left heel wound    Blood culture #1 **CANNOT BE ORDERED STAT** [[id number]]  (Abnormal)  (Susceptibility) Collected: 03/09/23 1525    Order Status: Completed Specimen: Blood from Peripheral, Hand, Right Updated: 03/13/23 1013     Blood Culture, Routine Gram stain aer bottle: Gram positive cocci in clusters resembling Staph      Results called to and read back by:Hamlet Mullen RN 03/10/2023  20:36      STAPHYLOCOCCUS SPECIES  Further identified as Staphylococcus pasteuri      MRSA/SA Rapid ID by PCR from Blood culture [[id number]] Collected: 03/09/23 1525    Order Status: Completed Updated: 03/10/23 2214     Staph aureus ID by PCR Negative     MRSA ID by PCR Negative    Aerobic culture [[id number]] Collected: 03/10/23 1741    Order Status: Canceled Specimen: Wound from Leg, Left     Culture, Anaerobe [[id number]] Collected: 03/10/23 1741    Order Status: Canceled Specimen: Wound from Leg, Left     Aerobic culture [[id number]] Collected: 03/10/23 1741    Order Status: Canceled Specimen: Wound from Leg, Left           I have personally reviewed the above laboratory studies.     Imaging:  EKG (my interpretation): no indication for EKG this admit at this time    X-Ray Ankle Complete Right    Result Date: 3/9/2023  EXAMINATION: XR ANKLE COMPLETE 3 VIEW RIGHT CLINICAL HISTORY: Unspecified open wound, unspecified lower leg, initial encounter TECHNIQUE: AP, lateral, and oblique images of the right ankle were performed. COMPARISON: Right foot series 02/23/2022 FINDINGS: Chronic appearing deformity of the mid to distal femoral shaft presumably sequela of remote trauma.  No acute displaced fracture, dislocation or destructive osseous process.  There is DJD. Nonspecific soft tissue swelling about the distal right lower leg, ankle and dorsal hindfoot particular along the anterior and lateral aspect likely representing  cellulitis.  No large soft tissue defect or subcutaneous emphysema seen.  Scattered atherosclerotic vascular and also nonspecific soft tissue calcifications noted.  No radiodense retained foreign body.  Small enthesophyte at the Achilles insertion site.     Distal right lower leg, ankle and hindfoot diffuse nonspecific soft tissue swelling from edema or cellulitis with superimposed more prominent soft tissue swelling along the anterolateral aspect of the distal lower leg.  Clinical correlation advised. Additional chronic appearing findings as above. Electronically signed by: Juan Hernandez MD Date:    03/09/2023 Time:    16:51    MRI Ankle Without Contrast Right    Result Date: 3/9/2023  EXAMINATION: MRI ANKLE WITHOUT CONTRAST RIGHT CLINICAL HISTORY: Soft tissue infection suspected, ankle, xray done; TECHNIQUE: MRI ankle performed per routine protocol without contrast. COMPARISON: MRI right lower extremity from 09/01/2006.  MRI the contralateral ankle from 02/19/2021.  Radiographs of the right ankle from 03/09/2023 taken at 1543. FINDINGS: Bone: There is no evidence of acute osteomyelitis.  Marrow signal is normal.  No marrow edema or T1 hypointense signal. Articulations: There is cartilage loss of the joint between the navicular and the cuneiform bones with adjacent endplate edema and cystic change in the distal pole of the navicular.  Remaining articulations are grossly unremarkable.  There is no evidence of a large joint effusion. Tendons: Flexor, extensor, and peroneal tendons are grossly intact and demonstrate normal signal.  There is no evidence of tenosynovitis.  The Achilles tendon is intact. Ligaments: The anterior inferior and posteroinferior tibiofibular ligaments are intact.  The anterior talofibular ligament is intact.  The calcaneal fibular and posterior talofibular ligaments are intact.  The deltoid and spring ligaments are intact.  The Lisfranc ligament is not entirely included in the field of view.  Soft tissues: There is a large dorsal soft tissue ulcer with exposure of the extensor hallucis tendon.  There is circumferential soft tissue edema the ankle extending into the dorsum the hindfoot.     1. No acute osteomyelitis. 2. Large soft tissue ulcer of the dorsal aspect of the hindfoot and ankle with exposure of the extensor hallucis tendon.  Surrounding soft tissue edema and cellulitis as above. Electronically signed by: Zane Galeana Date:    03/09/2023 Time:    19:45       Current Medications:     Scheduled:   apixaban  5 mg Oral BID    ascorbic acid (vitamin C)  500 mg Oral Daily    aspirin  81 mg Oral Daily    atorvastatin  80 mg Oral Daily    cefTRIAXone (ROCEPHIN) IVPB  2 g Intravenous Q24H    clopidogreL  75 mg Oral Daily    gabapentin  200 mg Oral BID    hydrALAZINE  25 mg Oral TID    hydroCHLOROthiazide  25 mg Oral Daily    insulin aspart U-100  15 Units Subcutaneous TID WM    insulin detemir U-100  35 Units Subcutaneous QHS    isosorbide dinitrate  10 mg Oral TID    losartan  100 mg Oral Daily    metroNIDAZOLE  500 mg Oral Q8H    mupirocin   Nasal BID    NIFEdipine  60 mg Oral Daily    pantoprazole  40 mg Oral Daily    sodium chloride 0.9%  10 mL Intravenous Q6H    tamsulosin  0.4 mg Oral Daily    vancomycin (VANCOCIN) IVPB  750 mg Intravenous Q12H         Infusions:       PRN:  sodium chloride 0.9%, dextrose 10%, dextrose 10%, dextrose, dextrose, glucagon (human recombinant), insulin aspart U-100, naloxone, sodium chloride 0.9%, Flushing PICC Protocol **AND** sodium chloride 0.9% **AND** sodium chloride 0.9%, Pharmacy to dose Vancomycin consult **AND** vancomycin - pharmacy to dose    Antibiotics and Day Number of Therapy:  Antibiotics (From admission, onward)      Start     Stop Route Frequency Ordered    03/14/23 2230  cefTRIAXone (ROCEPHIN) 2 g in dextrose 5 % in water (D5W) 5 % 50 mL IVPB (MB+)         -- IV Every 24 hours (non-standard times) 03/14/23 1428    03/14/23 1400  vancomycin 750 mg in  dextrose 5 % (D5W) 250 mL IVPB (Vial-Mate)         -- IV Every 12 hours (non-standard times) 03/14/23 1257    03/13/23 0900  mupirocin 2 % ointment         03/18 0859 Nasl 2 times daily 03/13/23 0543    03/10/23 2200  metroNIDAZOLE tablet 500 mg         -- Oral Every 8 hours 03/10/23 1627    03/10/23 1725  vancomycin - pharmacy to dose  (vancomycin IVPB)        See Ham for full Linked Orders Report.    -- IV pharmacy to manage frequency 03/10/23 1627          Assessment:     Saji Castañeda is a 73 y.o. male with a PMHx of chronic LLE osteomyelitis,T2DM c/b neuropathy, PVD, DVT in RUE (June 2022), HTN, CKD, Mobitz Type 1, NSTEMI (9/2022) presenting with worsening BLE wounds that has been getting worse over the past 3 wks. Pt will be admitted to [facility] for the evaluation and management of chronic BLE wounds w/ superimposed cellulitis    Plan:     Chronic BLE wounds w/ superimposed cellulitis  Per Wound Care, pt has new R anterior ankle wounds w/ tendon exposure & a known L plantar foot wound w/ palpable bone in L heel from previous visit. Pt sent to ED for possible IV abx +/- debridement.  Afebrile w/ no elevations in WBC and ANC, however cannot r/o systemic infxn given pt's poor immune status 2/2 long T2DM hx  Consulted ID for abx management/regiment given hx of receiving dapto. per ID:  Pt needed PICC line (placed on 3/13 in [facility])  Vanc & Ceftriaxone IV along w/ Flagyl PO for ~6wks (last Vanc Trough 15.6 on 3/15)  End of IV abx date: 4/20/23  Weekly outpt labs on Mon or Tues while on IV abx:  CBC, CMP or BMP, ESR, CRP, CPK, and Vanc trough (goal: 15-20)  Appreciate recs  MRI R Ankle showed no acute osteomyelitis but showed large soft tissue ulcer of the dorsal aspect of the hindfoot and ankle with exposure of the extensor hallucis tendon w/ surrounding soft tissue edema and cellulitis.   BLE U/S showed no evidence of vasc occlusion/stenosis in RLE/LLE arterial system, but does display findings suggestive of  vasc ds  MRI L Foot showed changes of osteomyelitis involving the posterior calcaneus where there is a large soft tissue defect.  It is similar to 02/19/2021 examination.  Consulted Podiatry for L heel osteo eval; per Podiatry:  Subacute osteomyelitis of left foot  MRI reviewed with osteomyelitis to left heel. Heel wound without acute signs of infection, site with bone exposure. Bone debridement performed today and sent for culture, see procedure note. Continue z-flex boots to both lower extremities. Nursing orders in for wound care. Antibiotic management per Infectious Disease   Acute osteomyelitis of right ankle  Malodorous acutely infected ulcer to right anterior ankle which probes to level of bone and deep to ankle joint. Wound debridement to include soft tissue, tendon and bone, sent for culture. General Surgery consult placed for further management and consideration of BKA. Podiatry will sign off  Appreciate recs  Consulted Surgery for BKA eval; per Surgery:  Dr Almanzar has been discussing w/ pt about BKA however pt has been refusing so far.  Continue IV Abx and local wound care  Appreciate recs  3/10 Aerobic B-Cx grew Proteus Mirabilis. 3/12 BC x2 NGTD  3/10 Bone Cx showed no anaerobic growth    T2DM w/ Neuropathy  Last A1c 9.9 about 5 months ago  Home Metformin and Rosuvastatin w/ insulin  POCT glucose checks  A1c 8.4  Currently on Atorvastatin and SSI  Continue home Gabapentin for neuropathy  Continue home Lasix and Tamsulosin for hx inability to empty bladder 2/2 neuropathy     PAD w/ hx of DVT in RUE  DVT in RUE in June 2022  DAPT  Continue Eliquis     HTN  Continue home Hydralazine, Isodil, Losartan, and Nifedipine.     Hx of Type 1 Mobitz  Avoid BB  Unremarkable ROS and PE suggestive of cardiac process at this time  CTM     Hx of NSTEMI  Hx of NSTEMI in 9/2022  TTE during NSTEMI work-up showed LVEF ~60% w/ notable Pulm HTN (PA systolic pressure 41mmHg)  TTE 3/11 showed LVEF ~70% w/ no other  "acute/significant changes from previous  Unremarkable ROS and PE suggestive of cardiac process at this time  CTM     Ppx: Apixaban  Diet: Diabetic  Code: Full     Disposition: medically stable at this time; pending discharge disposition.    Vi Bains MD (Nak)  [facility] Internal Medicine, PGY-1    [facility] Medicine Hospitalist Pager numbers:   [facility] Hospitalist Medicine Team A (Lorne/Riki): [phone]  [facility] Hospitalist Medicine Team B (Arik/Lucio):  [phone]      "

## 2023-03-16 NOTE — PLAN OF CARE
Problem: Adult Inpatient Plan of Care  Goal: Plan of Care Review  Outcome: Ongoing, Progressing  Goal: Patient-Specific Goal (Individualized)  Outcome: Ongoing, Progressing  Goal: Absence of Hospital-Acquired Illness or Injury  Outcome: Ongoing, Progressing  Goal: Optimal Comfort and Wellbeing  Outcome: Ongoing, Progressing  Goal: Readiness for Transition of Care  Outcome: Ongoing, Progressing     Problem: Diabetes Comorbidity  Goal: Blood Glucose Level Within Targeted Range  Outcome: Ongoing, Progressing     Problem: Impaired Wound Healing  Goal: Optimal Wound Healing  Outcome: Ongoing, Progressing     Problem: Hypertension Comorbidity  Goal: Blood Pressure in Desired Range  Outcome: Ongoing, Progressing     Problem: Fall Injury Risk  Goal: Absence of Fall and Fall-Related Injury  Outcome: Ongoing, Progressing     Problem: Skin Injury Risk Increased  Goal: Skin Health and Integrity  Outcome: Ongoing, Progressing     Problem: Infection  Goal: Absence of Infection Signs and Symptoms  Outcome: Ongoing, Progressing

## 2023-03-16 NOTE — PLAN OF CARE
[facility] - Telemetry      HOME HEALTH ORDERS  FACE TO FACE ENCOUNTER    Patient Name: Saji Castañeda  YOB: 1950    PCP: [facility]   [address] [place] [address]  PCP Phone Number: [phone]  PCP Fax: [phone]    Encounter Date: 3/9/23    Admit to Home Health    Diagnoses:  Active Hospital Problems    Diagnosis  POA    *Acute osteomyelitis of right ankle [M86.171]  Yes    Subacute osteomyelitis of left foot [M86.272]  Yes    Peripheral arterial disease [I73.9]  Yes     Chronic    Diabetic neuropathy [E11.40]  Yes     Chronic      Resolved Hospital Problems   No resolved problems to display.       Follow Up Appointments:  Future Appointments   Date Time Provider Department Center   3/22/2023 10:00 AM Cesar Wu MD [facility]       Allergies:Review of patient's allergies indicates:  No Known Allergies    Medications:   Pt requiring 6 weeks of IV antibiotics. End Date 4/21/23 of   IV Vancomycin 750mg BID  IV Ceftriaxone 2g daily  Oral Metronidazole 500 TID    I have seen and examined this patient within the last 30 days. My clinical findings that support the need for the home health skilled services and home bound status are the following:no   Weakness/numbness causing balance and gait disturbance due to Infection making it taxing to leave home.  Requiring assistive device to leave home due to unsteady gait caused by  Infection.  Patient with medication mismanagement issues requiring home bound status as evidenced by  Poor understanding of medication regimen/dosage.     Diet:   diabetic diet 2000 calorie    Labs:  Report Lab results to PCP. and Dr. Gayle at the ID office, Fax [phone]  Weekly labs of CBC, BMP, ESR, CRP, Vanc trough, CPK.    Referrals/ Consults  Physical Therapy to evaluate and treat. Evaluate for home safety and equipment needs; Establish/upgrade home exercise program. Perform / instruct on therapeutic exercises, gait  training, transfer training, and Range of Motion.  Occupational Therapy to evaluate and treat. Evaluate home environment for safety and equipment needs. Perform/Instruct on transfers, ADL training, ROM, and therapeutic exercises.    Activities:   activity as tolerated    Nursing:   Agency to admit patient within 24 hours of hospital discharge unless specified on physician order or at patient request    SN to complete comprehensive assessment including routine vital signs. Instruct on disease process and s/s of complications to report to MD. Review/verify medication list sent home with the patient at time of discharge  and instruct patient/caregiver as needed. Frequency may be adjusted depending on start of care date.     Skilled nurse to perform up to 3 visits PRN for symptoms related to diagnosis    Notify MD if SBP > 160 or < 90; DBP > 90 or < 50; HR > 120 or < 50; Temp > 101; O2 < 88%.    Ok to schedule additional visits based on staff availability and patient request on consecutive days within the home health episode.    Miscellaneous   Home Infusion Therapy:   SN to perform Infusion Therapy/Central Line Care.  Review Central Line Care & Central Line Flush with patient.    Administer (drug and dose): IV Vancomycin 750mg BID end date 4/21/23  Last dose given: 0700 on 3/16/23                         Home dose due: 1900 3/16/23    Administer (drug and dose): IV Ceftriaxone 2g daily 4/21/23  Last dose given: 2200 3/15/23                         Home dose due: 2200 3/16/23    Administer (drug and dose): Oral Metronidazole 500 TID, end date 4/21/23  Last dose given: 0600 3/16/23                         Home dose due: 1400 3/16/23    Scrub the Hub: Prior to accessing the line, always perform a 30 second alcohol scrub  Each lumen of the central line is to be flushed at least daily with 10 mL Normal Saline and 3 mL Heparin flush (10 units/mL)  Skilled Nurse (SN) may draw blood from IV access  Blood Draw Procedure:   -  Aspirate at least 5 mL of blood   - Discard   - Obtain specimen   - Change injection cap   - Flush with 20 mL Normal Saline followed by a                 3-5 mL Heparin flush (10 units/mL)  Central :   - Sterile dressing changes are done weekly and as needed.   - Use chlor-hexadine scrub to cleanse site, apply Biopatch to insertion site,       apply securement device dressing   - Injection caps are changed weekly and after EVERY lab draw.   - If sterile gauze is under dressing to control oozing,                 dressing change must be performed every 24 hours until gauze is not needed.    Home Health Aide:  Nursing Daily, Physical Therapy Three times weekly, and Occupational Therapy Three times weekly    Wound Care Orders  yes:  Foot Ulcer:  Location: Bilateral lower extremity wounds.    Partial Eschar, dissolving tissue or with pink tissue: Collagenase (Santyl) daily, cover with telfa, wrap with with kerlix dressing    I certify that this patient is confined to his home and needs intermittent skilled nursing care, physical therapy, and speech therapy.    Seymour Yan MD  [facility] Internal Medicine -3  [facility] Hospitalist Team B    [facility] Medicine Hospitalist Pager numbers:   [facility] Hospitalist Medicine Team A (Lorne/Riki): [phone]  [facility] Hospitalist Medicine Team B (Arik/Lucio):  [phone]

## 2023-03-16 NOTE — CONSULTS
Requested by podiatry to apply wound vac dressing to R anterior ankle- place vaseline gauze to tendon and covered with black foam- obtained seal at 100mm/Hg continuous setting.  Covered with abd pad and wrapped with cast padding and lite ace.  Don heel boot. Appreciate PCT assistance.  Will change vac dressing 2x/week- Monday and Thursdays.  Notified nurse of care provided and requested Dr. Orozco to remove old orders for R ankle wound and to clarify L foot wound care orders.

## 2023-03-16 NOTE — PT/OT/SLP EVAL
Physical Therapy Evaluation and Treatment    Patient Name:  Saji Castañeda   MRN:  [id number]    Recommendations:     Discharge Recommendations:  (TBD)   Discharge Equipment Recommendations:  (TBD)   Barriers to discharge: Inaccessible home and increased assist required    Assessment:     Saji Castañeda is a 73 y.o. male admitted with a medical diagnosis of Acute osteomyelitis of right ankle.  He presents with the following impairments/functional limitations: weakness, impaired functional mobility, impaired endurance, gait instability, impaired balance, impaired self care skills, decreased lower extremity function, decreased upper extremity function, decreased safety awareness, edema, decreased ROM, orthopedic precautions, impaired skin, impaired joint extensibility . Pt educated on NWB B LE precautions per podiatry. Pt transitioned supine <> sit with Maile. Pt performed seated scooting laterally along EOB with Maile. D/c recs TBD pending progress.     Rehab Prognosis: Good; patient would benefit from acute skilled PT services to address these deficits and reach maximum level of function.    Recent Surgery: * No surgery found *      Plan:     During this hospitalization, patient to be seen 3 x/week to address the identified rehab impairments via therapeutic activities, therapeutic exercises, neuromuscular re-education, wheelchair management/training and progress toward the following goals:    Plan of Care Expires:  04/16/23    Subjective     Chief Complaint: need to have BM  Patient/Family Comments/goals: none stated  Pain/Comfort:  Pain Rating 1: 0/10    Patients cultural, spiritual, Baptism conflicts given the current situation: no    Living Environment:   Pt lives with brother & sister, SSH, 3STE, BHR, tub/shower combo  Prior to admission, patients level of function was pt reports using WC for mobility, states he stand pivots to WC from bed, sponge bathes.  Equipment used at home: wheelchair, walker, standard.   DME owned (not currently used): standard walker.  Upon discharge, patient will have assistance from unknown level of assistance from family.    Objective:     Communicated with nsg prior to session.  Patient found HOB elevated with pressure relief boots, bed alarm, Condom Catheter  upon PT entry to room.    General Precautions: Standard, fall  Orthopedic Precautions:RLE non weight bearing, LLE non weight bearing   Braces: N/A  Respiratory Status: Room air    Exams:  Cognitive Exam:  Patient is oriented to Person, Place, Time, and Situation  Postural Exam:  Patient presented with the following abnormalities:    -       Rounded shoulders  -       Forward head  Sensation: pt denies any numbness/tingling  Skin Integrity/Edema:      -       Skin integrity:  L foot amputation noted with wound at residual limb, and R anterior ankle wound noted; B lower legs/feet in dressing/bandaging and surgical bootie donned; pressure relief boots in  place; discoloration, increased skin thickness, and flaking of skin B LE  -       Edema: Moderate BLE  RLE ROM: WFL except R ankle ROM limited  RLE Strength: grossly 4/5 except R ankle NT  LLE ROM: WFL hip/knee  LLE Strength: grossly 4/5    Functional Mobility:  Bed Mobility:     Rolling Left:  minimum assistance and moderate assistance  Rolling Right: minimum assistance and moderate assistance  Scooting: Maile to maintain B LE NWB for seated scooting laterally along side of bed X 3 scoots and CGA-Maile scooting EOB; Max-TotalA x 2 scooting supine to HOB with use of draw sheet and bed in trend  Supine to Sit: minimum assistance, HOB elevated  Sit to Supine: minimum assistance       AM-PAC 6 CLICK MOBILITY  Total Score:9       Treatment & Education:  Pt educated on role of PT/POC  Pt requires increased time and effort for mobility   Pt educated on NWB B LE precautions per podiatry.   Pt transitioned supine <> sit with Maile.   Pt performed seated scooting laterally along EOB with Maile as  reported above. Assist to maintain BLE NWB   Good static sitting balance EOB  B rolling and hygiene performed 2/2 pt with BM  Pt requires TotalA for hygiene  Pressure relief boots donned       Patient left HOB elevated with all lines intact, call button in reach, bed alarm on, nsg notified, and PCTs present.    GOALS:   Multidisciplinary Problems       Physical Therapy Goals          Problem: Physical Therapy    Goal Priority Disciplines Outcome Goal Variances Interventions   Physical Therapy Goal     PT, PT/OT Ongoing, Progressing     Description: Goals to be met by: 23     Patient will increase functional independence with mobility by performin. Supine to sit with Stand-by Assistance  2. Sit to supine with Stand-by Assistance  3. Rolling to Left and Right with Stand-by Assistance.  4. Bed to chair transfer with Stand-by Assistance using Slideboard  5. Wheelchair propulsion x 80 feet with Modified [place] using bilateral upper extremities                         History:     Past Medical History:   Diagnosis Date    Arthritis     legs    Diabetes mellitus     Diabetes mellitus, type 2     Hyperlipidemia     Hypertension     Osteomyelitis        Past Surgical History:   Procedure Laterality Date    ANGIOGRAPHY OF LOWER EXTREMITY N/A 2/3/2021    Procedure: Angiogram Extremity Bilateral;  Surgeon: Ernst Chacko MD;  Location: [facility] OR [address] [place] [address];  Service: Peripheral Vascular;  Laterality: N/A;  7.4 mintues fluoroscopy time  816.15 mGy  170.17 Gycm2    AORTOGRAPHY WITH EXTREMITY RUNOFF Bilateral 2/3/2021    Procedure: AORTOGRAM, WITH EXTREMITY RUNOFF;  Surgeon: Ernst Chacko MD;  Location: [facility] OR [address] [place] [address];  Service: Peripheral Vascular;  Laterality: Bilateral;    DEBRIDEMENT OF FOOT Left 2021    Procedure: DEBRIDEMENT, LEFT HEEL;  Surgeon: Mayra Schroeder DPM;  Location: [facility] OR [address] [place] [address];  Service: Podiatry;  Laterality: Left;    FOOT AMPUTATION  2010    left high midfoot  amputation       Time Tracking:     PT Received On: 03/16/23  PT Start Time: 1407     PT Stop Time: 1440  PT Total Time (min): 33 min     Billable Minutes: Evaluation 10 and Therapeutic Activity 23 (cotx with OT)      03/16/2023

## 2023-03-16 NOTE — PLAN OF CARE
Placement efforts continue   Family unable to do home IV abx    Pt declined by 7 skilled units:   [facility] - Needs Exceed Capacity   [facility] - Needs Exceed Capacity   [facility] - Needs Exceed Capacity   [facility] - Needs Exceed Capacity   [facility] - Needs Exceed Capacity   [facility] - Needs Exceed Capacity   [facility]  - Needs Exceed Capacity     LEW spoke with [facility] - as pt's wound involves tendon and bone - too severe for [facility] / SNF setting.      Tuyet with [facility] is evaluating pt.   Referral sent per Mau per Ike, RN         03/16/23 2777   Post-Acute Status   Post-Acute Authorization Placement   Post-Acute Placement Status Referrals Sent   Discharge Delays (!) Other  (placement)   Discharge Plan   Discharge Plan A Long-term acute care facility (LTAC)

## 2023-03-16 NOTE — ASSESSMENT & PLAN NOTE
Ulcer improving today to right anterior ankle, continues to probe to bone, however signs of infection appear to be resolving and increased granular tissue noted. Recommend wound vac application. Orders placed.   Cultures previously taken, recommend 6 weeks of IV antibiotics for osteomyelitis

## 2023-03-16 NOTE — ASSESSMENT & PLAN NOTE
MRI reviewed with osteomyelitis to left heel. Heel wound without acute signs of infection, site with bone exposure. Discussed surgical vs conservative treatment options with amputation vs conservative treatment with 6 weeks of IV antibiotics with no guarantee of resolution. Patient elects to continue with antibiotics for now. Bone debridement previously performed with cultures taken. Antibiotic management per Infectious Disease. He will require 6 weeks of IV antibiotics.   Continue z-flex boots to both lower extremities, he is NWB to bilateral lower extremity.   Nursing orders in for wound care, upon d/c home health vs nursing to continue same dressing changes q3x per week. He is to continue follow up at wound clinic with Dr. Farrell

## 2023-03-16 NOTE — PT/OT/SLP EVAL
Occupational Therapy   Evaluation    Name: Saji Castañeda  MRN: [id number]  Admitting Diagnosis: Acute osteomyelitis of right ankle  Recent Surgery: * No surgery found *      Recommendations:     Discharge Recommendations: other (see comments) (TBD)  Discharge Equipment Recommendations:  other (see comments) (TBD)  Barriers to discharge:  Other (Comment) (Pt requires increased level of assistance)    Assessment:     Saji Castañeda is a 73 y.o. male with a medical diagnosis of Acute osteomyelitis of right ankle.  He presents with The primary encounter diagnosis was Cellulitis, unspecified cellulitis site. Diagnoses of Open wound, lower leg, Subacute osteomyelitis of left foot, Chronic osteomyelitis of ankle and foot, left, Nonhealing ulcer of right lower leg with fat layer exposed, Non healing left heel wound, Other acute osteomyelitis, unspecified ankle and foot, Acute osteomyelitis of right ankle, Bacteremia, Diabetic mononeuropathy associated with type 2 diabetes mellitus, and Acute osteomyelitis of calcaneum, left were also pertinent to this visit. Performance deficits affecting function: weakness, impaired functional mobility, gait instability, impaired endurance, decreased lower extremity function, decreased upper extremity function, decreased ROM, edema, impaired skin, orthopedic precautions, impaired self care skills, impaired balance, decreased safety awareness, decreased coordination, pain, impaired joint extensibility.      Pt would benefit from cont OT services in order to maximize functional independence. Recommending TBD pending progress. Per podiatry pt is NWB to BLE, pt educated on WB status, will need reinforcement. Pt performing sup<>sit with Maile. Pt requires TotalA for hygiene & diaper management in supine 2/2 BM. Will progress as able.     Rehab Prognosis: Good; patient would benefit from acute skilled OT services to address these deficits and reach maximum level of function.       Plan:     Patient  to be seen 3 x/week to address the above listed problems via self-care/home management, therapeutic activities, therapeutic exercises  Plan of Care Expires: 04/16/23  Plan of Care Reviewed with: patient    Subjective     Chief Complaint: Pt soiled in BM    Occupational Profile:  Living Environment: Pt lives with brother & sister, SSH, 3STE, BHR, tub/shower combo  Previous level of function: Pt reports using WC for mobility, states he stand pivots to WC from bed, sponge bathes  Equipment Used at Home: wheelchair, walker, standard  Assistance upon Discharge: Unknown level of assistance from family    Pain/Comfort:  Pain Rating 1: 0/10  Pain Addressed 1: Other (see comments) (none stated)    Patients cultural, spiritual, L[employer] conflicts given the current situation: no    Objective:     Communicated with: nsalonzo prior to session.  Patient found HOB elevated with pressure relief boots, bed alarm upon OT entry to room.    General Precautions: Standard, fall  Orthopedic Precautions: RLE non weight bearing, LLE non weight bearing  Braces: N/A  Respiratory Status: Room air    Occupational Performance:    Bed Mobility:    Patient completed Scooting/Bridging with maximal assistance and 2 persons to scoot to HOB with use of draw sheet  Patient completed Supine to Sit with minimum assistance & increased time  Patient completed Sit to Supine with minimum assistance    Functional Mobility/Transfers:  NT 2/2 BLE NWB  While pt EOB, bed height elevated to maintain BLE NWB & pressure relief boots donned    Activities of Daily Living:  Toileting: total assistance for hygiene & diaper management in supine  UBD: Maile with HOB elevated to doff soiled gown & hiren clean gown    Cognitive/Visual Perceptual:  Cognitive/Psychosocial Skills:     -       Oriented to: Person, Place, Time, and Situation   -       Follows Commands/attention:Follows two-step commands and Follows multistep  commands  -       Safety awareness/insight to disability:  impaired   -       Mood/Affect/Coping skills/emotional control: Cooperative and Flat affect    Physical Exam:  Sensation:    -       Intact  light/touch BUE  Upper Extremity Range of Motion:     -       Right Upper Extremity: Deficits: decreased shoulder flex ~70 degrees  -       Left Upper Extremity: Deficits: decreased shoulder flex ~90 degrees  Upper Extremity Strength:    -       Right Upper Extremity: WFL distally  -       Left Upper Extremity: WFL distally   Strength:    -       Right Upper Extremity: WFL  -       Left Upper Extremity: WFL    AMPAC 6 Click ADL:  AMPA Total Score: 16    Treatment & Education:  Pt would benefit from cont OT services in order to maximize functional independence.   Per podiatry pt is NWB to BLE, pt educated on WB status, will need reinforcement.   Pt performing sup<>sit with Maile with increased time.   Pt with good sitting balance EOB.  Pt scooting laterally EOB with Maile while maintaining BLE NWB status.   Pt requires TotalA for hygiene & diaper management in supine 2/2 BM.   Will progress as able.     Patient left HOB elevated with all lines intact, call button in reach, bed alarm on, nsg notified, PCTs present, and B pressure relief boots donned    GOALS:   Multidisciplinary Problems       Occupational Therapy Goals          Problem: Occupational Therapy    Goal Priority Disciplines Outcome Interventions   Occupational Therapy Goal     OT, PT/OT Ongoing, Progressing    Description: Goals to be met by: 4/16/2023     Patient will increase functional independence with ADLs by performing:    UE Dressing with Set-up Assistance.  Grooming while seated with Set-up Assistance.  Toileting from drop-arm bedside commode with Supervision for hygiene and clothing management.   Rolling to Bilateral with Modified [place].   Supine to sit with Modified [place].  Toilet transfer to bedside commode with Minimal Assistance & use of slide board.                         History:      Past Medical History:   Diagnosis Date    Arthritis     legs    Diabetes mellitus     Diabetes mellitus, type 2     Hyperlipidemia     Hypertension     Osteomyelitis          Past Surgical History:   Procedure Laterality Date    ANGIOGRAPHY OF LOWER EXTREMITY N/A 2/3/2021    Procedure: Angiogram Extremity Bilateral;  Surgeon: Ernst Chacko MD;  Location: [facility] OR [address] [place] [address];  Service: Peripheral Vascular;  Laterality: N/A;  7.4 mintues fluoroscopy time  816.15 mGy  170.17 Gycm2    AORTOGRAPHY WITH EXTREMITY RUNOFF Bilateral 2/3/2021    Procedure: AORTOGRAM, WITH EXTREMITY RUNOFF;  Surgeon: Ernst Chacko MD;  Location: [facility] OR [address] [place] [address];  Service: Peripheral Vascular;  Laterality: Bilateral;    DEBRIDEMENT OF FOOT Left 2/23/2021    Procedure: DEBRIDEMENT, LEFT HEEL;  Surgeon: Mayra Schroeder DPM;  Location: [facility] OR [address] [place] [address];  Service: Podiatry;  Laterality: Left;    FOOT AMPUTATION  October 2010    left high midfoot amputation       Time Tracking:     OT Date of Treatment: 03/16/23  OT Start Time: 1407  OT Stop Time: 1440  OT Total Time (min): 33 min with PT    Billable Minutes:Evaluation 10  Self Care/Home Management 13  Therapeutic Activity 10    3/16/2023

## 2023-03-16 NOTE — PLAN OF CARE
Pt would benefit from cont OT services in order to maximize functional independence. Recommending TBD pending progress. Per podiatry pt is NWB to BLE, pt educated on WB status, will need reinforcement. Pt performing sup<>sit with Maile. Pt requires TotalA for hygiene & diaper management in supine 2/2 BM. Will progress as able.     Problem: Occupational Therapy  Goal: Occupational Therapy Goal  Description: Goals to be met by: 4/16/2023     Patient will increase functional independence with ADLs by performing:    UE Dressing with Set-up Assistance.  Grooming while seated with Set-up Assistance.  Toileting from drop-arm bedside commode with Supervision for hygiene and clothing management.   Rolling to Bilateral with Modified [place].   Supine to sit with Modified [place].  Toilet transfer to bedside commode with Minimal Assistance & use of slide board.    Outcome: Ongoing, Progressing

## 2023-03-16 NOTE — DISCHARGE SUMMARY
"[facility] Hospital Medicine Discharge Summary    Primary Team: [facility] Hospitalist Team B  Attending Physician: Vinod Elise MD  Resident: Seymour Yan MD  Intern: Vi Bains MD (Nak)    Date of Admit: 3/9/2023  Date of Discharge: 3/22/2023    Discharge to: [facility]  Condition: Stable    Discharge Diagnoses     Patient Active Problem List   Diagnosis    Edema of leg    Tinea pedis    Wound, open, foot with complication    Diabetic neuropathy    Essential hypertension    Type 2 diabetes, uncontrolled, with neuropathy    Cataract cortical, senile    Chronic kidney disease, stage III (moderate)    Anemia of chronic disease    Asymptomatic Mobitz (type) I (Wenckebach's) atrioventricular block    Peripheral arterial disease    PVD (peripheral vascular disease)    Cellulitis of left ankle    Osteomyelitis of left lower extremity    Pressure injury of left heel, stage 4    Morbid obesity    Iron deficiency anemia    Acute osteomyelitis of calcaneum, left    Leg swelling    Foot pain, left    Acute deep vein thrombosis (DVT) of right upper extremity    Nonhealing ulcer of right lower leg with fat layer exposed    Hypergranulation    Subacute osteomyelitis of left foot    Acute osteomyelitis of right ankle     Consultants and Procedures     Consultants:  Podiatry, General Surgery, ID, Wound Care, CM     Procedures:   Debridement in BLE; PICC line in LUE    Brief History of Present Illness & Summary of Hospital Course   Saji Castañeda is a 73 y.o. male with a PMHx of chronic LLE osteomyelitis,T2DM c/b neuropathy, PVD, DVT in RUE (June 2022), HTN, CKD, Mobitz Type 1, NSTEMI (9/2022) presented on 3/9/2023 from [facility] for evaluation of worsening BLE wounds. Per Wound Care, pt has new R anterior ankle wounds w/ tendon exposure & a known L plantar foot wound w/ palpable bone in L heel from previous visit; pt sent to ED for possible IV abx +/- debridement. Work-up in ED notable for chronic BLE wounds w/ " "superimposed cellulitis, which pt was admitted for.      Hospital Course:  Over the course of his hospital stay, pt's work-up was concerning for worsening BLE wounds w/ superimposed cellulitis, subacute osteomyelitis of left foot, and acute osteomyelitis of right ankle. Consulted ID for abx management and recommended to continue Vanc IV, Ceftriaxone IV, and Flagyl PO w/ weekly outpatient follow-up labs. Consulted Podiatry for BLE debridement and bone cxs, as well as the pt receiving a wound vac in the RLE aided by LINDA. Consulted Surgery for possible bilateral BKA, which has been discussed w/ pt several times w/ consistent refusals for procedure. Consulted Case Management for discharge planning w/ a final discharge disposition to NH. On the day of discharge, patient was afebrile with stable vital signs and will be discharged in stable condition to NH with close follow up.     Discharge Vitals and Physical Exam:   Last 24 Hour Vital Signs:  BP  Min: 130/60  Max: 178/72  Temp  Av.4 °F (36.3 °C)  Min: 96 °F (35.6 °C)  Max: 98.3 °F (36.8 °C)  Pulse  Av.1  Min: 40  Max: 98  Resp  Av  Min: 18  Max: 18  SpO2  Av.5 %  Min: 95 %  Max: 98 %  Height  Av' 9" (175.3 cm)  Min: 5' 9" (175.3 cm)  Max: 5' 9" (175.3 cm)  Weight  Av.1 kg (275 lb 12.7 oz)  Min: 125.1 kg (275 lb 12.7 oz)  Max: 125.1 kg (275 lb 12.7 oz)(  Body mass index is 40.73 kg/m².  Intake/Output - Last 3 Shifts          0700   0659  0700   0659  0700   0659    Urine (mL/kg/hr) 1000 (0.3) 700 (0.2)     Other 50 50     Stool 0 0     Total Output 1050 750     Net -1050 -750            Urine Occurrence 1 x      Stool Occurrence 1 x 1 x           Physical Examination:  Physical Exam   Constitutional: He is oriented to person, place, and time. normal appearance. He appears obese.  Non-toxic appearance. No distress. He does not appear ill.   HENT:   Mouth/Throat: Mucous membranes are moist. Oropharynx is clear. "   Eyes: Pupils are equal, round, and reactive to light.   Cardiovascular: Normal rate, regular rhythm, normal heart sounds and normal pulses.  Pulmonary:     Effort: Pulmonary effort is normal.      Breath sounds: Normal breath sounds.   Abdominal: Soft. Normal appearance and bowel sounds are normal.   Musculoskeletal:         General: Swelling and deformity present on BLE; stable. Wound vac on RLE c/d/i. LLE dressing c/d/i     Comments: See media tab on 3/9/23   Neurological: He is alert and oriented to person, place, and time.   Skin: Skin is warm and dry. PICC line site in LUE c/d/i  See media tab on 3/9/23   Psychiatric: linear thought, constricted affect;     Hospital Course By Problem with Pertinent Findings   Chronic BLE wounds w/ superimposed cellulitis  Per Wound Care, pt has new R anterior ankle wounds w/ tendon exposure & a known L plantar foot wound w/ palpable bone in L heel from previous visit. Pt sent to ED for possible IV abx +/- debridement.  Afebrile w/ no elevations in WBC and ANC, however cannot r/o systemic infxn given pt's poor immune status 2/2 long T2DM hx  Consulted ID for abx management/regiment given hx of receiving dapto. per ID:  Pt needed PICC line (placed on 3/13 in E)  Vanc & Ceftriaxone IV along w/ Flagyl PO for ~6wks (last Vanc Trough 15.6 on 3/15; goal of 15-20)  End of IV abx date: 4/20/23  Weekly outpt labs on Mon or Tues while on IV abx:  CBC, CMP or BMP, ESR, CRP, CPK, and Vanc trough (goal: 15-20)  Appreciate recs  MRI R Ankle showed no acute osteomyelitis but showed large soft tissue ulcer of the dorsal aspect of the hindfoot and ankle with exposure of the extensor hallucis tendon w/ surrounding soft tissue edema and cellulitis.   BLE U/S showed no evidence of vasc occlusion/stenosis in RLE/LLE arterial system, but does display findings suggestive of vasc ds  MRI L Foot showed changes of osteomyelitis involving the posterior calcaneus where there is a large soft tissue  defect.  It is similar to 02/19/2021 examination.  Consulted Podiatry for L heel osteo eval; per Podiatry on 3/16:  Subacute osteomyelitis of left foot  MRI reviewed with osteomyelitis to left heel. Heel wound without acute signs of infection, site with bone exposure. Discussed surgical vs conservative treatment options with amputation vs conservative treatment with 6 weeks of IV antibiotics with no guarantee of resolution.   Patient elects to continue with antibiotics for now. Bone debridement previously performed with cultures taken. Antibiotic management per Infectious Disease. He will require 6 weeks of IV antibiotics.   Continue z-flex boots to both lower extremities, he is NWB to bilateral lower extremity.   Nursing orders in for wound care to left heel, upon d/c home health vs nursing to continue same dressing changes q3x per week. Wound vac change q3x per week, orders to follow. He is to continue follow up at wound clinic with Dr. Farrell  Acute osteomyelitis of right ankle  Ulcer improving today to right anterior ankle, continues to probe to bone, however signs of infection appear to be resolving and increased granular tissue noted. Recommend wound vac application. Orders placed.   Cultures previously taken, recommend 6 weeks of IV antibiotics for osteomyelitis  Appreciate recs/help  Consulted Surgery for BKA eval; per Surgery:  Dr Almanzar has been discussing w/ pt about BKA however pt has been refusing so far.  Continue IV Abx and local wound care  Defer wound care to Dr Orozco  Appreciate recs  3/10 Aerobic B-Cx grew Proteus Mirabilis. 3/12 BC x2 NGTD  3/10 Bone Cx showed no anaerobic growth  3/17 Wound vac placed on RLE for bleed and to be changed 2x a wk per Wound Care; stable  Follow-up outpatient w/ PCP and Wound Care     T2DM w/ Neuropathy  3/10 A1c 8.4 (improved from 5 months ago w/ 9.9)  While inpt: Atorvastatin 80mg daily, insulin aspart 15U TID w/ meals, insulin detemir 40U nightly, and SSI w/  meals and nightly w/ accucheck  Continue outpatient: Metformin, Glipizide, Rosuvastatin, insulin aspart 20U TID w/ meals, insulin glargine 45U nightly  Continue home Gabapentin for neuropathy  Continue home Lasix and Tamsulosin for hx of inability to empty bladder 2/2 neuropathy  Follow-up outpatient w/ PCP     PAD w/ hx of DVT in RUE  Hx of DVT in RUE in June 2022  Continue Eliquis & DAPT  Follow-up outpatient w/ PCP & Cardiology    Bradycardia  Pt's HR have been ranging b/n 40s to 60s  3/19 EKG: A-fib w/ slow ventricular respone w/ competing junc pacemaker  3/19 TSH slightly above normal ranges (4.103) w/ Free T4 wnl  Cardiovascular ROS and PE have been unremarkable at this time.  Follow-up outpatient w/ PCP & Cardiology    HTN  BP elevated intermittently only in SBP, likely 2/2 BLE pain that present intermittently per pt  Continue home Hydralazine, Isordil, Hyzaar, Nifedipine  Follow-up outpatient w/ PCP & Cardiology     Hx of Type 1 Mobitz  Avoid BB  Unremarkable ROS and PE suggestive of cardiac process during this hospital admission  Follow-up outpatient w/ PCP & Cardiology     Hx of NSTEMI  Hx of NSTEMI in 9/2022  TTE 9/18/22 during NSTEMI work-up showed LVEF ~60% w/ Pulm HTN (PA systolic pressure 41mmHg)  TTE 3/11/23 showed LVEF ~70% w/ known Pulm HTN (PA systolic pressure 36mmHg); no other acute/significant changes from previous  Unremarkable ROS and PE suggestive of cardiac process during this hospital admission  Follow-up outpatient w/ PCP & Cardiology    Discharge Medications        Medication List        START taking these medications      dextrose 5 % (D5W) SolP 250 mL with vancomycin 1.25 gram SolR 1,250 mg  Inject 1,250 mg into the vein once daily.     dextrose 5 % in water (D5W) 5 % PgBk 50 mL with cefTRIAXone 2 gram SolR 2 g  Inject 2 g into the vein once daily.     metroNIDAZOLE 500 MG tablet  Commonly known as: FLAGYL  Take 1 tablet (500 mg total) by mouth every 8 (eight) hours.            CHANGE  "how you take these medications      insulin aspart U-100 100 unit/mL (3 mL) Inpn pen  Commonly known as: NovoLOG  Inject 20 Units into the skin 3 (three) times daily with meals.  What changed: Another medication with the same name was removed. Continue taking this medication, and follow the directions you see here.            CONTINUE taking these medications      ascorbic acid (vitamin C) 500 MG tablet  Commonly known as: VITAMIN C     aspirin 81 MG EC tablet  Commonly known as: ECOTRIN     B COMPLEX-VITAMIN B12 tablet  Generic drug: b complex vitamins     BD ULTRA-FINE ADITYA PEN NEEDLE 32 gauge x 5/32" Ndle  Generic drug: pen needle, diabetic  USE FOUR TIMES DAILY WITH MEALS AND NIGHTLY     blood sugar diagnostic Strp  Commonly known as: CONTOUR TEST STRIPS  Inject 1 strip into the skin 2 (two) times daily before meals.     clopidogreL 75 mg tablet  Commonly known as: PLAVIX  Take 1 tablet (75 mg total) by mouth once daily.     ELIQUIS 5 mg Tab  Generic drug: apixaban  Take 1 tablet (5 mg total) by mouth 2 (two) times daily.     FLOMAX 0.4 mg Cap  Generic drug: tamsulosin     furosemide 40 MG tablet  Commonly known as: LASIX     gabapentin 100 MG capsule  Commonly known as: NEURONTIN  Take 2 capsules (200 mg total) by mouth 2 (two) times daily.     glipiZIDE 10 MG tablet  Commonly known as: GLUCOTROL     hydrALAZINE 25 MG tablet  Commonly known as: APRESOLINE  Take 1 tablet (25 mg total) by mouth 3 (three) times daily.     isosorbide dinitrate 10 MG tablet  Commonly known as: ISORDIL  Take 1 tablet (10 mg total) by mouth 3 (three) times daily.     Lactobacillus rhamnosus GG 10 billion cell capsule  Commonly known as: CULTURELLE  Take 1 capsule by mouth 2 (two) times daily.     LANTUS SOLOSTAR U-100 INSULIN glargine 100 units/mL SubQ pen  Generic drug: insulin  Inject 45 Units into the skin every evening.     losartan-hydrochlorothiazide 100-25 mg 100-25 mg per tablet  Commonly known as: HYZAAR     metFORMIN 500 MG " "ER 24hr tablet  Commonly known as: GLUCOPHAGE-XR     MICROLET LANCET Misc  Generic drug: lancets     NIFEdipine 60 MG Tbsr  Commonly known as: ADALAT CC     pantoprazole 40 MG tablet  Commonly known as: PROTONIX     rosuvastatin 40 MG Tab  Commonly known as: CRESTOR            STOP taking these medications      acetaminophen 500 MG tablet  Commonly known as: TYLENOL               Where to Get Your Medications        These medications were sent to [facility] [place]  [address], [place] [address]      Hours: Mon-Fri, 8a-5:30p Phone: [phone]   metroNIDAZOLE 500 MG tablet       Information about where to get these medications is not yet available    Ask your nurse or doctor about these medications  dextrose 5 % (D5W) SolP 250 mL with vancomycin 1.25 gram SolR 1,250 mg  dextrose 5 % in water (D5W) 5 % PgBk 50 mL with cefTRIAXone 2 gram SolR 2 g          Discharge Information:   Diet:  Cardiac/Diabetic    Physical Activity:  As tolerated             Instructions:  1. Take all medications as prescribed  2. Keep all follow-up appointments  3. Return to the hospital or call your primary care physicians if any worsening symptoms such as fever, chest pain, shortness of breath, return of symptoms, or any other concerns.    Follow-Up Appointments:  PCP  Wound Care w/ Dr Farrell  Cardiology w/ Dr Jazmin Lebron "Kassandra Bains MD  [facility] Internal Medicine, PGY-1    "

## 2023-03-16 NOTE — PROGRESS NOTES
[facility] - Telemetry  Podiatry  Progress Note    Patient Name: Saji Castañeda  MRN: [id number]  Admission Date: 3/9/2023  Hospital Length of Stay: 5 days  Attending Physician: Vinod Elise MD  Primary Care Provider: [facility]     Subjective:     Interval History: Patient seen today at bedside, denies pain, no further pedal complaints        Scheduled Meds:   apixaban  5 mg Oral BID    ascorbic acid (vitamin C)  500 mg Oral Daily    aspirin  81 mg Oral Daily    atorvastatin  80 mg Oral Daily    cefTRIAXone (ROCEPHIN) IVPB  2 g Intravenous Q24H    clopidogreL  75 mg Oral Daily    gabapentin  200 mg Oral BID    hydrALAZINE  25 mg Oral TID    hydroCHLOROthiazide  25 mg Oral Daily    insulin aspart U-100  15 Units Subcutaneous TID WM    insulin detemir U-100  35 Units Subcutaneous QHS    isosorbide dinitrate  10 mg Oral TID    losartan  100 mg Oral Daily    metroNIDAZOLE  500 mg Oral Q8H    mupirocin   Nasal BID    NIFEdipine  60 mg Oral Daily    pantoprazole  40 mg Oral Daily    sodium chloride 0.9%  10 mL Intravenous Q6H    tamsulosin  0.4 mg Oral Daily    vancomycin (VANCOCIN) IVPB  750 mg Intravenous Q12H     Continuous Infusions:  PRN Meds:sodium chloride 0.9%, dextrose 10%, dextrose 10%, dextrose, dextrose, glucagon (human recombinant), insulin aspart U-100, naloxone, sodium chloride 0.9%, Flushing PICC Protocol **AND** sodium chloride 0.9% **AND** sodium chloride 0.9%, Pharmacy to dose Vancomycin consult **AND** vancomycin - pharmacy to dose    Review of Systems   Constitutional:  Negative for activity change, chills, diaphoresis and fever.   HENT:  Negative for congestion and hearing loss.    Respiratory:  Negative for cough and shortness of breath.    Cardiovascular:  Positive for leg swelling.   Gastrointestinal:  Negative for nausea and vomiting.   Musculoskeletal:  Negative for back pain, gait problem, joint swelling and myalgias.   Skin:  Positive for color change, pallor and wound. Negative for  rash.   Neurological:  Positive for numbness. Negative for tremors, speech difficulty and weakness.   Psychiatric/Behavioral:  Negative for agitation and confusion. The patient is not nervous/anxious.    Objective:     Vital Signs (Most Recent):  Temp: 98.5 °F (36.9 °C) (03/16/23 1119)  Pulse: 61 (03/16/23 1119)  Resp: 18 (03/16/23 1119)  BP: 127/62 (03/16/23 1119)  SpO2: (!) 94 % (03/16/23 1119)   Vital Signs (24h Range):  Temp:  [97.3 °F (36.3 °C)-98.7 °F (37.1 °C)] 98.5 °F (36.9 °C)  Pulse:  [61-81] 61  Resp:  [17-18] 18  SpO2:  [90 %-97 %] 94 %  BP: (120-167)/(54-67) 127/62     Weight: 122.9 kg (271 lb)  Body mass index is 40.02 kg/m².    Foot Exam    General  General Appearance: appears stated age and healthy   Orientation: alert and oriented to person, place, and time   Affect: appropriate       Right Foot/Ankle     Inspection and Palpation  Ecchymosis: none  Tenderness: none   Swelling: none   Skin Exam: ulcer;     Neurovascular  Dorsalis pedis: absent  Posterior tibial: absent  Saphenous nerve sensation: diminished  Tibial nerve sensation: diminished  Superficial peroneal nerve sensation: diminished  Deep peroneal nerve sensation: diminished  Sural nerve sensation: diminished      Left Foot/Ankle      Inspection and Palpation  Ecchymosis: none  Tenderness: none   Swelling: none   Skin Exam: ulcer;     Neurovascular  Dorsalis pedis: absent  Posterior tibial: absent  Saphenous nerve sensation: diminished  Tibial nerve sensation: diminished  Superficial peroneal nerve sensation: diminished  Deep peroneal nerve sensation: diminished  Sural nerve sensation: diminished        Laboratory:  A1C:   Recent Labs   Lab 09/17/22  1609 03/10/23  0608   HGBA1C 9.9* 8.4*     Blood Cultures: No results for input(s): LABBLOO in the last 48 hours.  CBC:   Recent Labs   Lab 03/14/23  0813   WBC 7.58   RBC 3.25*   HGB 8.4*   HCT 27.4*      MCV 84   MCH 25.8*   MCHC 30.7*     CMP:   Recent Labs   Lab 03/14/23  0813  03/15/23  0759   * 128*   CALCIUM 8.7 8.8   ALBUMIN 1.9*  --    PROT 7.5  --    * 134*   K 4.1 4.4   CO2 25 25    100   BUN 29* 30*   CREATININE 1.6* 1.6*   ALKPHOS 52*  --    ALT 14  --    AST 16  --    BILITOT 0.2  --      CRP: No results for input(s): CRP in the last 168 hours.  ESR: No results for input(s): SEDRATE in the last 168 hours.  Wound Cultures:   Recent Labs   Lab 03/10/23  1741   LABAERO PROTEUS MIRABILIS  Few  No other significant isolate  *     Microbiology Results (last 7 days)       Procedure Component Value Units Date/Time    Blood culture #2 **CANNOT BE ORDERED STAT** [[id number]] Collected: 03/10/23 0608    Order Status: Completed Specimen: Blood Updated: 03/15/23 1612     Blood Culture, Routine No growth after 5 days.    Blood culture [[id number]] Collected: 03/12/23 0648    Order Status: Completed Specimen: Blood Updated: 03/15/23 1412     Blood Culture, Routine No Growth to date      No Growth to date      No Growth to date      No Growth to date    Blood culture [[id number]] Collected: 03/12/23 0647    Order Status: Completed Specimen: Blood from Antecubital, Left Updated: 03/15/23 1412     Blood Culture, Routine No Growth to date      No Growth to date      No Growth to date      No Growth to date    Culture, Anaerobe [[id number]] Collected: 03/10/23 1741    Order Status: Completed Specimen: Wound from Leg, Left Updated: 03/15/23 1103     Anaerobic Culture No anaerobes isolated    Aerobic culture [[id number]]  (Abnormal)  (Susceptibility) Collected: 03/10/23 1741    Order Status: Completed Specimen: Wound from Leg, Left Updated: 03/13/23 1219     Aerobic Bacterial Culture PROTEUS MIRABILIS  Few  No other significant isolate      Narrative:      Left heel wound    Blood culture #1 **CANNOT BE ORDERED STAT** [[id number]]  (Abnormal)  (Susceptibility) Collected: 03/09/23 1525    Order Status: Completed Specimen: Blood from Peripheral, Hand, Right Updated: 03/13/23 1013     Blood  Culture, Routine Gram stain aer bottle: Gram positive cocci in clusters resembling Staph      Results called to and read back by:Hamlet Mullen RN 03/10/2023  20:36      STAPHYLOCOCCUS SPECIES  Further identified as Staphylococcus pasteuri      MRSA/SA Rapid ID by PCR from Blood culture [[id number]] Collected: 03/09/23 1525    Order Status: Completed Updated: 03/10/23 2214     Staph aureus ID by PCR Negative     MRSA ID by PCR Negative    Aerobic culture [[id number]] Collected: 03/10/23 1741    Order Status: Canceled Specimen: Wound from Leg, Left     Culture, Anaerobe [[id number]] Collected: 03/10/23 1741    Order Status: Canceled Specimen: Wound from Leg, Left     Aerobic culture [[id number]] Collected: 03/10/23 1741    Order Status: Canceled Specimen: Wound from Leg, Left           Specimen (24h ago, onward)      None            Diagnostic Results:  I have reviewed all pertinent imaging results/findings within the past 24 hours.    Clinical Findings:    Ulcer x3 to posterior left heel with no acute signs of infection. Posterior left heel ulcer measures 3.0x4.0x0.2cm with bone exposure. Middle ulcer measures 2.5x2.5x0.3cm and probes to bone. Distal ulcer measures 0.5x0.5x0.4cm and probes to bone. All wounds with mixed fibrogranular base, no malodor.     Ulcer to right anterior ankle, post debridement, measures 4.0x5.0x2.5cm probes to bone over ankle joint, with exposed tendon. With mixed fibrogranular base.     Previous Images:            Assessment/Plan:     Neuro  Diabetic neuropathy  Management per Hospital Medicine    Cardiac/Vascular  Peripheral arterial disease  Recommend Interventional Cardiology consult    ID  * Acute osteomyelitis of right ankle  Ulcer improving today to right anterior ankle, continues to probe to bone, however signs of infection appear to be resolving and increased granular tissue noted. Recommend wound vac application. Orders placed.   Cultures previously taken, recommend 6 weeks of IV  antibiotics for osteomyelitis      Subacute osteomyelitis of left foot  MRI reviewed with osteomyelitis to left heel. Heel wound without acute signs of infection, site with bone exposure. Discussed surgical vs conservative treatment options with amputation vs conservative treatment with 6 weeks of IV antibiotics with no guarantee of resolution. Patient elects to continue with antibiotics for now. Bone debridement previously performed with cultures taken. Antibiotic management per Infectious Disease. He will require 6 weeks of IV antibiotics.   Continue z-flex boots to both lower extremities, he is NWB to bilateral lower extremity.   Nursing orders in for wound care to left heel, upon d/c home health vs nursing to continue same dressing changes q3x per week. Wound vac change q3x per week, orders to follow. He is to continue follow up at wound clinic with Dr. Bud Orozco DPM  Podiatry  [place] [facility], [facility]

## 2023-03-16 NOTE — PLAN OF CARE
Problem: Physical Therapy  Goal: Physical Therapy Goal  Description: Goals to be met by: 23     Patient will increase functional independence with mobility by performin. Supine to sit with Stand-by Assistance  2. Sit to supine with Stand-by Assistance  3. Rolling to Left and Right with Stand-by Assistance.  4. Bed to chair transfer with Stand-by Assistance using Slideboard  5. Wheelchair propulsion x 80 feet with Modified [place] using bilateral upper extremities    Outcome: Ongoing, Progressing     PT Eval completed, note to follow. Pt educated on NWB B LE precautions per podiatry. Pt transitioned supine <> sit with Maile. Pt performed seated scooting laterally along EOB with Maile. D/c recs TBD pending progress.

## 2023-03-16 NOTE — SUBJECTIVE & OBJECTIVE
Subjective:     Interval History: Patient seen today at bedside, denies pain, no further pedal complaints        Scheduled Meds:   apixaban  5 mg Oral BID    ascorbic acid (vitamin C)  500 mg Oral Daily    aspirin  81 mg Oral Daily    atorvastatin  80 mg Oral Daily    cefTRIAXone (ROCEPHIN) IVPB  2 g Intravenous Q24H    clopidogreL  75 mg Oral Daily    gabapentin  200 mg Oral BID    hydrALAZINE  25 mg Oral TID    hydroCHLOROthiazide  25 mg Oral Daily    insulin aspart U-100  15 Units Subcutaneous TID WM    insulin detemir U-100  35 Units Subcutaneous QHS    isosorbide dinitrate  10 mg Oral TID    losartan  100 mg Oral Daily    metroNIDAZOLE  500 mg Oral Q8H    mupirocin   Nasal BID    NIFEdipine  60 mg Oral Daily    pantoprazole  40 mg Oral Daily    sodium chloride 0.9%  10 mL Intravenous Q6H    tamsulosin  0.4 mg Oral Daily    vancomycin (VANCOCIN) IVPB  750 mg Intravenous Q12H     Continuous Infusions:  PRN Meds:sodium chloride 0.9%, dextrose 10%, dextrose 10%, dextrose, dextrose, glucagon (human recombinant), insulin aspart U-100, naloxone, sodium chloride 0.9%, Flushing PICC Protocol **AND** sodium chloride 0.9% **AND** sodium chloride 0.9%, Pharmacy to dose Vancomycin consult **AND** vancomycin - pharmacy to dose    Review of Systems   Constitutional:  Negative for activity change, chills, diaphoresis and fever.   HENT:  Negative for congestion and hearing loss.    Respiratory:  Negative for cough and shortness of breath.    Cardiovascular:  Positive for leg swelling.   Gastrointestinal:  Negative for nausea and vomiting.   Musculoskeletal:  Negative for back pain, gait problem, joint swelling and myalgias.   Skin:  Positive for color change, pallor and wound. Negative for rash.   Neurological:  Positive for numbness. Negative for tremors, speech difficulty and weakness.   Psychiatric/Behavioral:  Negative for agitation and confusion. The patient is not nervous/anxious.    Objective:     Vital Signs (Most  Recent):  Temp: 98.5 °F (36.9 °C) (03/16/23 1119)  Pulse: 61 (03/16/23 1119)  Resp: 18 (03/16/23 1119)  BP: 127/62 (03/16/23 1119)  SpO2: (!) 94 % (03/16/23 1119)   Vital Signs (24h Range):  Temp:  [97.3 °F (36.3 °C)-98.7 °F (37.1 °C)] 98.5 °F (36.9 °C)  Pulse:  [61-81] 61  Resp:  [17-18] 18  SpO2:  [90 %-97 %] 94 %  BP: (120-167)/(54-67) 127/62     Weight: 122.9 kg (271 lb)  Body mass index is 40.02 kg/m².    Foot Exam    General  General Appearance: appears stated age and healthy   Orientation: alert and oriented to person, place, and time   Affect: appropriate       Right Foot/Ankle     Inspection and Palpation  Ecchymosis: none  Tenderness: none   Swelling: none   Skin Exam: ulcer;     Neurovascular  Dorsalis pedis: absent  Posterior tibial: absent  Saphenous nerve sensation: diminished  Tibial nerve sensation: diminished  Superficial peroneal nerve sensation: diminished  Deep peroneal nerve sensation: diminished  Sural nerve sensation: diminished      Left Foot/Ankle      Inspection and Palpation  Ecchymosis: none  Tenderness: none   Swelling: none   Skin Exam: ulcer;     Neurovascular  Dorsalis pedis: absent  Posterior tibial: absent  Saphenous nerve sensation: diminished  Tibial nerve sensation: diminished  Superficial peroneal nerve sensation: diminished  Deep peroneal nerve sensation: diminished  Sural nerve sensation: diminished        Laboratory:  A1C:   Recent Labs   Lab 09/17/22  1609 03/10/23  0608   HGBA1C 9.9* 8.4*     Blood Cultures: No results for input(s): LABBLOO in the last 48 hours.  CBC:   Recent Labs   Lab 03/14/23  0813   WBC 7.58   RBC 3.25*   HGB 8.4*   HCT 27.4*      MCV 84   MCH 25.8*   MCHC 30.7*     CMP:   Recent Labs   Lab 03/14/23  0813 03/15/23  0759   * 128*   CALCIUM 8.7 8.8   ALBUMIN 1.9*  --    PROT 7.5  --    * 134*   K 4.1 4.4   CO2 25 25    100   BUN 29* 30*   CREATININE 1.6* 1.6*   ALKPHOS 52*  --    ALT 14  --    AST 16  --    BILITOT 0.2  --       CRP: No results for input(s): CRP in the last 168 hours.  ESR: No results for input(s): SEDRATE in the last 168 hours.  Wound Cultures:   Recent Labs   Lab 03/10/23  1741   LABAERO PROTEUS MIRABILIS  Few  No other significant isolate  *     Microbiology Results (last 7 days)       Procedure Component Value Units Date/Time    Blood culture #2 **CANNOT BE ORDERED STAT** [[id number]] Collected: 03/10/23 0608    Order Status: Completed Specimen: Blood Updated: 03/15/23 1612     Blood Culture, Routine No growth after 5 days.    Blood culture [[id number]] Collected: 03/12/23 0648    Order Status: Completed Specimen: Blood Updated: 03/15/23 1412     Blood Culture, Routine No Growth to date      No Growth to date      No Growth to date      No Growth to date    Blood culture [[id number]] Collected: 03/12/23 0647    Order Status: Completed Specimen: Blood from Antecubital, Left Updated: 03/15/23 1412     Blood Culture, Routine No Growth to date      No Growth to date      No Growth to date      No Growth to date    Culture, Anaerobe [[id number]] Collected: 03/10/23 1741    Order Status: Completed Specimen: Wound from Leg, Left Updated: 03/15/23 1103     Anaerobic Culture No anaerobes isolated    Aerobic culture [[id number]]  (Abnormal)  (Susceptibility) Collected: 03/10/23 1741    Order Status: Completed Specimen: Wound from Leg, Left Updated: 03/13/23 1219     Aerobic Bacterial Culture PROTEUS MIRABILIS  Few  No other significant isolate      Narrative:      Left heel wound    Blood culture #1 **CANNOT BE ORDERED STAT** [[id number]]  (Abnormal)  (Susceptibility) Collected: 03/09/23 1525    Order Status: Completed Specimen: Blood from Peripheral, Hand, Right Updated: 03/13/23 1013     Blood Culture, Routine Gram stain aer bottle: Gram positive cocci in clusters resembling Staph      Results called to and read back by:Hamlet Mullen RN 03/10/2023  20:36      STAPHYLOCOCCUS SPECIES  Further identified as Staphylococcus pasteuri       MRSA/SA Rapid ID by PCR from Blood culture [[id number]] Collected: 03/09/23 1525    Order Status: Completed Updated: 03/10/23 2214     Staph aureus ID by PCR Negative     MRSA ID by PCR Negative    Aerobic culture [[id number]] Collected: 03/10/23 1741    Order Status: Canceled Specimen: Wound from Leg, Left     Culture, Anaerobe [[id number]] Collected: 03/10/23 1741    Order Status: Canceled Specimen: Wound from Leg, Left     Aerobic culture [[id number]] Collected: 03/10/23 1741    Order Status: Canceled Specimen: Wound from Leg, Left           Specimen (24h ago, onward)      None            Diagnostic Results:  I have reviewed all pertinent imaging results/findings within the past 24 hours.    Clinical Findings:    Ulcer x3 to posterior left heel with no acute signs of infection. Posterior left heel ulcer measures 3.0x4.0x0.2cm with bone exposure. Middle ulcer measures 2.5x2.5x0.3cm and probes to bone. Distal ulcer measures 0.5x0.5x0.4cm and probes to bone. All wounds with mixed fibrogranular base, no malodor.     Ulcer to right anterior ankle, post debridement, measures 4.0x5.0x2.5cm probes to bone over ankle joint, with exposed tendon. With mixed fibrogranular base.     Previous Images:

## 2023-03-17 LAB
ALBUMIN SERPL BCP-MCNC: 2 G/DL (ref 3.5–5.2)
ALP SERPL-CCNC: 52 U/L (ref 55–135)
ALT SERPL W/O P-5'-P-CCNC: 16 U/L (ref 10–44)
ANION GAP SERPL CALC-SCNC: 9 MMOL/L (ref 8–16)
AST SERPL-CCNC: 17 U/L (ref 10–40)
BACTERIA BLD CULT: NORMAL
BACTERIA BLD CULT: NORMAL
BASOPHILS # BLD AUTO: 0.03 K/UL (ref 0–0.2)
BASOPHILS NFR BLD: 0.4 % (ref 0–1.9)
BILIRUB SERPL-MCNC: 0.1 MG/DL (ref 0.1–1)
BUN SERPL-MCNC: 36 MG/DL (ref 8–23)
CALCIUM SERPL-MCNC: 8.4 MG/DL (ref 8.7–10.5)
CHLORIDE SERPL-SCNC: 98 MMOL/L (ref 95–110)
CO2 SERPL-SCNC: 26 MMOL/L (ref 23–29)
CREAT SERPL-MCNC: 1.7 MG/DL (ref 0.5–1.4)
DIFFERENTIAL METHOD: ABNORMAL
EOSINOPHIL # BLD AUTO: 0.5 K/UL (ref 0–0.5)
EOSINOPHIL NFR BLD: 5.6 % (ref 0–8)
ERYTHROCYTE [DISTWIDTH] IN BLOOD BY AUTOMATED COUNT: 15.9 % (ref 11.5–14.5)
EST. GFR  (NO RACE VARIABLE): 42 ML/MIN/1.73 M^2
GLUCOSE SERPL-MCNC: 254 MG/DL (ref 70–110)
HCT VFR BLD AUTO: 26 % (ref 40–54)
HGB BLD-MCNC: 8.2 G/DL (ref 14–18)
IMM GRANULOCYTES # BLD AUTO: 0.05 K/UL (ref 0–0.04)
IMM GRANULOCYTES NFR BLD AUTO: 0.6 % (ref 0–0.5)
LYMPHOCYTES # BLD AUTO: 1.2 K/UL (ref 1–4.8)
LYMPHOCYTES NFR BLD: 14.4 % (ref 18–48)
MCH RBC QN AUTO: 26.4 PG (ref 27–31)
MCHC RBC AUTO-ENTMCNC: 31.5 G/DL (ref 32–36)
MCV RBC AUTO: 84 FL (ref 82–98)
MONOCYTES # BLD AUTO: 0.7 K/UL (ref 0.3–1)
MONOCYTES NFR BLD: 8.2 % (ref 4–15)
NEUTROPHILS # BLD AUTO: 6 K/UL (ref 1.8–7.7)
NEUTROPHILS NFR BLD: 70.8 % (ref 38–73)
NRBC BLD-RTO: 0 /100 WBC
PLATELET # BLD AUTO: 482 K/UL (ref 150–450)
PMV BLD AUTO: 8.7 FL (ref 9.2–12.9)
POCT GLUCOSE: 160 MG/DL (ref 70–110)
POCT GLUCOSE: 177 MG/DL (ref 70–110)
POCT GLUCOSE: 198 MG/DL (ref 70–110)
POCT GLUCOSE: 239 MG/DL (ref 70–110)
POTASSIUM SERPL-SCNC: 4.1 MMOL/L (ref 3.5–5.1)
PROT SERPL-MCNC: 7.4 G/DL (ref 6–8.4)
RBC # BLD AUTO: 3.11 M/UL (ref 4.6–6.2)
SODIUM SERPL-SCNC: 133 MMOL/L (ref 136–145)
WBC # BLD AUTO: 8.46 K/UL (ref 3.9–12.7)

## 2023-03-17 PROCEDURE — 25000003 PHARM REV CODE 250: Performed by: STUDENT IN AN ORGANIZED HEALTH CARE EDUCATION/TRAINING PROGRAM

## 2023-03-17 PROCEDURE — 25000003 PHARM REV CODE 250

## 2023-03-17 PROCEDURE — 97110 THERAPEUTIC EXERCISES: CPT | Mod: CQ

## 2023-03-17 PROCEDURE — 80053 COMPREHEN METABOLIC PANEL: CPT | Performed by: STUDENT IN AN ORGANIZED HEALTH CARE EDUCATION/TRAINING PROGRAM

## 2023-03-17 PROCEDURE — 25000003 PHARM REV CODE 250: Performed by: INTERNAL MEDICINE

## 2023-03-17 PROCEDURE — 11000001 HC ACUTE MED/SURG PRIVATE ROOM

## 2023-03-17 PROCEDURE — A4216 STERILE WATER/SALINE, 10 ML: HCPCS | Performed by: STUDENT IN AN ORGANIZED HEALTH CARE EDUCATION/TRAINING PROGRAM

## 2023-03-17 PROCEDURE — 97110 THERAPEUTIC EXERCISES: CPT | Mod: CO

## 2023-03-17 PROCEDURE — 85025 COMPLETE CBC W/AUTO DIFF WBC: CPT | Performed by: STUDENT IN AN ORGANIZED HEALTH CARE EDUCATION/TRAINING PROGRAM

## 2023-03-17 PROCEDURE — 63600175 PHARM REV CODE 636 W HCPCS: Performed by: INTERNAL MEDICINE

## 2023-03-17 PROCEDURE — 63600175 PHARM REV CODE 636 W HCPCS: Performed by: STUDENT IN AN ORGANIZED HEALTH CARE EDUCATION/TRAINING PROGRAM

## 2023-03-17 PROCEDURE — 97530 THERAPEUTIC ACTIVITIES: CPT | Mod: CO

## 2023-03-17 RX ADMIN — PANTOPRAZOLE SODIUM 40 MG: 40 TABLET, DELAYED RELEASE ORAL at 08:03

## 2023-03-17 RX ADMIN — ISOSORBIDE DINITRATE 10 MG: 10 TABLET ORAL at 09:03

## 2023-03-17 RX ADMIN — INSULIN ASPART 2 UNITS: 100 INJECTION, SOLUTION INTRAVENOUS; SUBCUTANEOUS at 04:03

## 2023-03-17 RX ADMIN — INSULIN ASPART 15 UNITS: 100 INJECTION, SOLUTION INTRAVENOUS; SUBCUTANEOUS at 05:03

## 2023-03-17 RX ADMIN — METRONIDAZOLE 500 MG: 500 TABLET ORAL at 01:03

## 2023-03-17 RX ADMIN — GABAPENTIN 200 MG: 100 CAPSULE ORAL at 08:03

## 2023-03-17 RX ADMIN — NIFEDIPINE 60 MG: 30 TABLET, FILM COATED, EXTENDED RELEASE ORAL at 08:03

## 2023-03-17 RX ADMIN — INSULIN ASPART 15 UNITS: 100 INJECTION, SOLUTION INTRAVENOUS; SUBCUTANEOUS at 08:03

## 2023-03-17 RX ADMIN — VANCOMYCIN HYDROCHLORIDE 750 MG: 750 INJECTION, POWDER, LYOPHILIZED, FOR SOLUTION INTRAVENOUS at 02:03

## 2023-03-17 RX ADMIN — INSULIN ASPART 15 UNITS: 100 INJECTION, SOLUTION INTRAVENOUS; SUBCUTANEOUS at 11:03

## 2023-03-17 RX ADMIN — CLOPIDOGREL BISULFATE 75 MG: 75 TABLET ORAL at 08:03

## 2023-03-17 RX ADMIN — ISOSORBIDE DINITRATE 10 MG: 10 TABLET ORAL at 08:03

## 2023-03-17 RX ADMIN — APIXABAN 5 MG: 5 TABLET, FILM COATED ORAL at 09:03

## 2023-03-17 RX ADMIN — SODIUM CHLORIDE, PRESERVATIVE FREE 10 ML: 5 INJECTION INTRAVENOUS at 11:03

## 2023-03-17 RX ADMIN — GABAPENTIN 200 MG: 100 CAPSULE ORAL at 09:03

## 2023-03-17 RX ADMIN — TAMSULOSIN HYDROCHLORIDE 0.4 MG: 0.4 CAPSULE ORAL at 08:03

## 2023-03-17 RX ADMIN — SODIUM CHLORIDE, PRESERVATIVE FREE 10 ML: 5 INJECTION INTRAVENOUS at 12:03

## 2023-03-17 RX ADMIN — HYDRALAZINE HYDROCHLORIDE 25 MG: 25 TABLET, FILM COATED ORAL at 08:03

## 2023-03-17 RX ADMIN — ATORVASTATIN CALCIUM 80 MG: 40 TABLET, FILM COATED ORAL at 08:03

## 2023-03-17 RX ADMIN — LOSARTAN POTASSIUM 100 MG: 50 TABLET, FILM COATED ORAL at 08:03

## 2023-03-17 RX ADMIN — APIXABAN 5 MG: 5 TABLET, FILM COATED ORAL at 08:03

## 2023-03-17 RX ADMIN — SODIUM CHLORIDE, PRESERVATIVE FREE 10 ML: 5 INJECTION INTRAVENOUS at 06:03

## 2023-03-17 RX ADMIN — SODIUM CHLORIDE: 9 INJECTION, SOLUTION INTRAVENOUS at 01:03

## 2023-03-17 RX ADMIN — METRONIDAZOLE 500 MG: 500 TABLET ORAL at 09:03

## 2023-03-17 RX ADMIN — HYDRALAZINE HYDROCHLORIDE 25 MG: 25 TABLET, FILM COATED ORAL at 09:03

## 2023-03-17 RX ADMIN — VANCOMYCIN HYDROCHLORIDE 750 MG: 750 INJECTION, POWDER, LYOPHILIZED, FOR SOLUTION INTRAVENOUS at 01:03

## 2023-03-17 RX ADMIN — ISOSORBIDE DINITRATE 10 MG: 10 TABLET ORAL at 02:03

## 2023-03-17 RX ADMIN — OXYCODONE HYDROCHLORIDE AND ACETAMINOPHEN 500 MG: 500 TABLET ORAL at 08:03

## 2023-03-17 RX ADMIN — INSULIN ASPART 4 UNITS: 100 INJECTION, SOLUTION INTRAVENOUS; SUBCUTANEOUS at 08:03

## 2023-03-17 RX ADMIN — INSULIN DETEMIR 35 UNITS: 100 INJECTION, SOLUTION SUBCUTANEOUS at 09:03

## 2023-03-17 RX ADMIN — HYDRALAZINE HYDROCHLORIDE 25 MG: 25 TABLET, FILM COATED ORAL at 02:03

## 2023-03-17 RX ADMIN — HYDROCHLOROTHIAZIDE 25 MG: 25 TABLET ORAL at 08:03

## 2023-03-17 RX ADMIN — INSULIN ASPART 2 UNITS: 100 INJECTION, SOLUTION INTRAVENOUS; SUBCUTANEOUS at 11:03

## 2023-03-17 RX ADMIN — CEFTRIAXONE SODIUM 2 G: 2 INJECTION, POWDER, FOR SOLUTION INTRAMUSCULAR; INTRAVENOUS at 09:03

## 2023-03-17 RX ADMIN — ASPIRIN 81 MG: 81 TABLET, COATED ORAL at 08:03

## 2023-03-17 RX ADMIN — MUPIROCIN: 20 OINTMENT TOPICAL at 08:03

## 2023-03-17 RX ADMIN — METRONIDAZOLE 500 MG: 500 TABLET ORAL at 06:03

## 2023-03-17 NOTE — PLAN OF CARE
Pt declined by numerous SNF's.  Referral sent to 61 SNF's.  Also pending LTAC auth from [facility].    I called Dr. Leach with [facility] to discuss LTAC auth pending and pt's need for LTAC.  He is in a meeting at this time and will call me back.        03/17/23 1340   Post-Acute Status   Post-Acute Authorization Placement   Post-Acute Placement Status Referrals Sent       Ike Vargas RN   Supervisor Case Management-[place]  [phone]

## 2023-03-17 NOTE — PLAN OF CARE
Problem: Occupational Therapy  Goal: Occupational Therapy Goal  Description: Goals to be met by: 4/16/2023     Patient will increase functional independence with ADLs by performing:    UE Dressing with Set-up Assistance.  Grooming while seated with Set-up Assistance.  Toileting from drop-arm bedside commode with Supervision for hygiene and clothing management.   Rolling to Bilateral with Modified [place].   Supine to sit with Modified [place].  Toilet transfer to bedside commode with Minimal Assistance & use of slide board.    Outcome: Ongoing, Progressing   Saji Castañeda is a 73 y.o. male with a medical diagnosis of Acute osteomyelitis of right ankle.  Performance deficits affecting function are weakness, impaired endurance, impaired self care skills, impaired functional mobility, gait instability, impaired balance, decreased upper extremity function, decreased lower extremity function, pain, decreased ROM, impaired skin, edema, orthopedic precautions, impaired sensation.   Pt found in bed, agreeable to therapy.  Pt tolerated therapy well. Rec post acute at time of discharge to maximize [place] and safety with ADL's and functional mobility. Continue OT services to address functional goals, progressing as able.

## 2023-03-17 NOTE — PROGRESS NOTES
Pharmacokinetic Assessment Follow Up: IV Vancomycin    Vancomycin serum concentration assessment(s):    Vancomycin Regimen Plan:  Trough level scheduled @ 1300 was not drawn prior to vancomycin administration. Will need to reschedule vancomycin trough for tomorrow @ 0100.      Drug levels (last 3 results):  Recent Labs   Lab Result Units 03/15/23  1344   Vancomycin-Trough ug/mL 15.6       Pharmacy will continue to follow and monitor vancomycin.    Please contact pharmacy at extension [phone] for questions regarding this assessment.    Thank you for the consult,   Raymond White       Patient brief summary:  Saji Castañeda is a 73 y.o. male initiated on antimicrobial therapy with IV Vancomycin for treatment of bone/joint infection    The patient's current regimen is vancomycin 750mg IV 12h    Drug Allergies:   Review of patient's allergies indicates:  No Known Allergies    Actual Body Weight:   122.9kg    Renal Function:   Estimated Creatinine Clearance: 49.4 mL/min (A) (based on SCr of 1.7 mg/dL (H)).,     Dialysis Method (if applicable):      CBC (last 72 hours):  Recent Labs   Lab Result Units 03/17/23  0411   WBC K/uL 8.46   Hemoglobin g/dL 8.2*   Hematocrit % 26.0*   Platelets K/uL 482*   Gran % % 70.8   Lymph % % 14.4*   Mono % % 8.2   Eosinophil % % 5.6   Basophil % % 0.4   Differential Method  Automated       Metabolic Panel (last 72 hours):  Recent Labs   Lab Result Units 03/15/23  0759 03/17/23  0411   Sodium mmol/L 134* 133*   Potassium mmol/L 4.4 4.1   Chloride mmol/L 100 98   CO2 mmol/L 25 26   Glucose mg/dL 128* 254*   BUN mg/dL 30* 36*   Creatinine mg/dL 1.6* 1.7*   Albumin g/dL  --  2.0*   Total Bilirubin mg/dL  --  0.1   Alkaline Phosphatase U/L  --  52*   AST U/L  --  17   ALT U/L  --  16       Vancomycin Administrations:  vancomycin given in the last 96 hours                     vancomycin 750 mg in dextrose 5 % (D5W) 250 mL IVPB (Vial-Mate) (mg) 750 mg New Bag 03/17/23 1325     750 mg New Bag  0206      750 mg New Bag 03/16/23 1507     750 mg New Bag  0243     750 mg New Bag 03/15/23 1421     750 mg New Bag  0110     750 mg New Bag 03/14/23 1421                    Microbiologic Results:  Microbiology Results (last 7 days)       Procedure Component Value Units Date/Time    Blood culture [[id number]] Collected: 03/12/23 0647    Order Status: Completed Specimen: Blood from Antecubital, Left Updated: 03/16/23 1412     Blood Culture, Routine No Growth to date      No Growth to date      No Growth to date      No Growth to date      No Growth to date    Blood culture [[id number]] Collected: 03/12/23 0648    Order Status: Completed Specimen: Blood Updated: 03/16/23 1412     Blood Culture, Routine No Growth to date      No Growth to date      No Growth to date      No Growth to date      No Growth to date    Blood culture #2 **CANNOT BE ORDERED STAT** [[id number]] Collected: 03/10/23 0608    Order Status: Completed Specimen: Blood Updated: 03/15/23 1612     Blood Culture, Routine No growth after 5 days.    Culture, Anaerobe [[id number]] Collected: 03/10/23 1741    Order Status: Completed Specimen: Wound from Leg, Left Updated: 03/15/23 1103     Anaerobic Culture No anaerobes isolated    Aerobic culture [[id number]]  (Abnormal)  (Susceptibility) Collected: 03/10/23 1741    Order Status: Completed Specimen: Wound from Leg, Left Updated: 03/13/23 1219     Aerobic Bacterial Culture PROTEUS MIRABILIS  Few  No other significant isolate      Narrative:      Left heel wound    Blood culture #1 **CANNOT BE ORDERED STAT** [[id number]]  (Abnormal)  (Susceptibility) Collected: 03/09/23 1525    Order Status: Completed Specimen: Blood from Peripheral, Hand, Right Updated: 03/13/23 1013     Blood Culture, Routine Gram stain aer bottle: Gram positive cocci in clusters resembling Staph      Results called to and read back by:Hamlet Mullen RN 03/10/2023  20:36      STAPHYLOCOCCUS SPECIES  Further identified as Staphylococcus pasteuri      MRSA/SA  Rapid ID by PCR from Blood culture [[id number]] Collected: 03/09/23 1525    Order Status: Completed Updated: 03/10/23 2214     Staph aureus ID by PCR Negative     MRSA ID by PCR Negative    Aerobic culture [[id number]] Collected: 03/10/23 1741    Order Status: Canceled Specimen: Wound from Leg, Left     Culture, Anaerobe [[id number]] Collected: 03/10/23 1741    Order Status: Canceled Specimen: Wound from Leg, Left     Aerobic culture [[id number]] Collected: 03/10/23 1741    Order Status: Canceled Specimen: Wound from Leg, Left

## 2023-03-17 NOTE — PROGRESS NOTES
"[facility] Medicine Progress Note    Primary Team: \[facility]\"" Hospitalist Team B  Attending Physician: Vinod Elise MD  Resident: Seymour Yan MD  Intern: Vi Bains MD (Nak)    Hospital Day: 6 days    Chief Complaint: Chronic BLE wounds w/ superimposed cellulitis worsening over 3wks    Subjective:   NAEON. Afebrile w/ stable vitals on room air. Pt seen by writer this morning. No concerns voiced about PICC line sight & BLE wounds. Pt denied fevers, chills, N/V, SOB, chest pain, abd pain, BLE pains, or numbness/weakness.     Review of Systems   All other systems reviewed and are negative.      Objective:   Last 24 Hour Vital Signs:  BP  Min: 127/58  Max: 147/67  Temp  Av.4 °F (36.9 °C)  Min: 97.6 °F (36.4 °C)  Max: 98.7 °F (37.1 °C)  Pulse  Av.8  Min: 56  Max: 76  Resp  Av.4  Min: 17  Max: 18  SpO2  Av.2 %  Min: 93 %  Max: 95 %    Intake/Output Summary (Last 24 hours) at 3/17/2023 0723  Last data filed at 3/17/2023 0616  Gross per 24 hour   Intake 1289.06 ml   Output 1800 ml   Net -510.94 ml     Physical Examination:    Physical Exam   Constitutional: He is oriented to person, place, and time. normal appearance. He appears obese.  Non-toxic appearance. No distress. He does not appear ill.   HENT:   Mouth/Throat: Mucous membranes are moist. Oropharynx is clear.   Eyes: Pupils are equal, round, and reactive to light.   Cardiovascular: Normal rate, regular rhythm, normal heart sounds and normal pulses.  Pulmonary:     Effort: Pulmonary effort is normal.      Breath sounds: Normal breath sounds.   Abdominal: Soft. Normal appearance and bowel sounds are normal.   Musculoskeletal:         General: Swelling and deformity present; stable.      Comments: See media tab on 3/9/23   Neurological: He is alert and oriented to person, place, and time.   Skin: Skin is warm and dry. PICC line site in LUE c/d/i  See media tab on 3/9/23   Psychiatric: His behavior is normal.      Laboratory:  Recent Labs "   Lab 03/13/23  0428 03/14/23  0813 03/15/23  0759 03/17/23  0411   WBC 8.03 7.58  --  8.46   HGB 8.1* 8.4*  --  8.2*   HCT 25.6* 27.4*  --  26.0*    426  --  482*   MCV 84 84  --  84   RDW 15.7* 15.7*  --  15.9*   * 134* 134* 133*   K 4.0 4.1 4.4 4.1    100 100 98   CO2 25 25 25 26   BUN 25* 29* 30* 36*   CREATININE 1.7* 1.6* 1.6* 1.7*   * 145* 128* 254*   PROT 7.3 7.5  --  7.4   ALBUMIN 1.9* 1.9*  --  2.0*   BILITOT 0.2 0.2  --  0.1   AST 22 16  --  17   ALKPHOS 54* 52*  --  52*   ALT 18 14  --  16     Microbiology Results (last 7 days)       Procedure Component Value Units Date/Time    Blood culture [[id number]] Collected: 03/12/23 0647    Order Status: Completed Specimen: Blood from Antecubital, Left Updated: 03/16/23 1412     Blood Culture, Routine No Growth to date      No Growth to date      No Growth to date      No Growth to date      No Growth to date    Blood culture [[id number]] Collected: 03/12/23 0648    Order Status: Completed Specimen: Blood Updated: 03/16/23 1412     Blood Culture, Routine No Growth to date      No Growth to date      No Growth to date      No Growth to date      No Growth to date    Blood culture #2 **CANNOT BE ORDERED STAT** [[id number]] Collected: 03/10/23 0608    Order Status: Completed Specimen: Blood Updated: 03/15/23 1612     Blood Culture, Routine No growth after 5 days.    Culture, Anaerobe [[id number]] Collected: 03/10/23 1741    Order Status: Completed Specimen: Wound from Leg, Left Updated: 03/15/23 1103     Anaerobic Culture No anaerobes isolated    Aerobic culture [[id number]]  (Abnormal)  (Susceptibility) Collected: 03/10/23 1741    Order Status: Completed Specimen: Wound from Leg, Left Updated: 03/13/23 1219     Aerobic Bacterial Culture PROTEUS MIRABILIS  Few  No other significant isolate      Narrative:      Left heel wound    Blood culture #1 **CANNOT BE ORDERED STAT** [[id number]]  (Abnormal)  (Susceptibility) Collected: 03/09/23 1525    Order  Status: Completed Specimen: Blood from Peripheral, Hand, Right Updated: 03/13/23 1013     Blood Culture, Routine Gram stain aer bottle: Gram positive cocci in clusters resembling Staph      Results called to and read back by:Hamlet Mullen RN 03/10/2023  20:36      STAPHYLOCOCCUS SPECIES  Further identified as Staphylococcus pasteuri      MRSA/SA Rapid ID by PCR from Blood culture [[id number]] Collected: 03/09/23 1525    Order Status: Completed Updated: 03/10/23 2214     Staph aureus ID by PCR Negative     MRSA ID by PCR Negative    Aerobic culture [[id number]] Collected: 03/10/23 1741    Order Status: Canceled Specimen: Wound from Leg, Left     Culture, Anaerobe [[id number]] Collected: 03/10/23 1741    Order Status: Canceled Specimen: Wound from Leg, Left     Aerobic culture [[id number]] Collected: 03/10/23 1741    Order Status: Canceled Specimen: Wound from Leg, Left           I have personally reviewed the above laboratory studies.     Imaging:  EKG (my interpretation): no indication for EKG this admit at this time    X-Ray Ankle Complete Right    Result Date: 3/9/2023  EXAMINATION: XR ANKLE COMPLETE 3 VIEW RIGHT CLINICAL HISTORY: Unspecified open wound, unspecified lower leg, initial encounter TECHNIQUE: AP, lateral, and oblique images of the right ankle were performed. COMPARISON: Right foot series 02/23/2022 FINDINGS: Chronic appearing deformity of the mid to distal femoral shaft presumably sequela of remote trauma.  No acute displaced fracture, dislocation or destructive osseous process.  There is DJD. Nonspecific soft tissue swelling about the distal right lower leg, ankle and dorsal hindfoot particular along the anterior and lateral aspect likely representing cellulitis.  No large soft tissue defect or subcutaneous emphysema seen.  Scattered atherosclerotic vascular and also nonspecific soft tissue calcifications noted.  No radiodense retained foreign body.  Small enthesophyte at the Achilles insertion site.      Distal right lower leg, ankle and hindfoot diffuse nonspecific soft tissue swelling from edema or cellulitis with superimposed more prominent soft tissue swelling along the anterolateral aspect of the distal lower leg.  Clinical correlation advised. Additional chronic appearing findings as above. Electronically signed by: Juan Hernandez MD Date:    03/09/2023 Time:    16:51    MRI Ankle Without Contrast Right    Result Date: 3/9/2023  EXAMINATION: MRI ANKLE WITHOUT CONTRAST RIGHT CLINICAL HISTORY: Soft tissue infection suspected, ankle, xray done; TECHNIQUE: MRI ankle performed per routine protocol without contrast. COMPARISON: MRI right lower extremity from 09/01/2006.  MRI the contralateral ankle from 02/19/2021.  Radiographs of the right ankle from 03/09/2023 taken at 1543. FINDINGS: Bone: There is no evidence of acute osteomyelitis.  Marrow signal is normal.  No marrow edema or T1 hypointense signal. Articulations: There is cartilage loss of the joint between the navicular and the cuneiform bones with adjacent endplate edema and cystic change in the distal pole of the navicular.  Remaining articulations are grossly unremarkable.  There is no evidence of a large joint effusion. Tendons: Flexor, extensor, and peroneal tendons are grossly intact and demonstrate normal signal.  There is no evidence of tenosynovitis.  The Achilles tendon is intact. Ligaments: The anterior inferior and posteroinferior tibiofibular ligaments are intact.  The anterior talofibular ligament is intact.  The calcaneal fibular and posterior talofibular ligaments are intact.  The deltoid and spring ligaments are intact.  The Lisfranc ligament is not entirely included in the field of view. Soft tissues: There is a large dorsal soft tissue ulcer with exposure of the extensor hallucis tendon.  There is circumferential soft tissue edema the ankle extending into the dorsum the hindfoot.     1. No acute osteomyelitis. 2. Large soft tissue ulcer  of the dorsal aspect of the hindfoot and ankle with exposure of the extensor hallucis tendon.  Surrounding soft tissue edema and cellulitis as above. Electronically signed by: Zane Galeana Date:    03/09/2023 Time:    19:45       Current Medications:     Scheduled:   apixaban  5 mg Oral BID    ascorbic acid (vitamin C)  500 mg Oral Daily    aspirin  81 mg Oral Daily    atorvastatin  80 mg Oral Daily    cefTRIAXone (ROCEPHIN) IVPB  2 g Intravenous Q24H    clopidogreL  75 mg Oral Daily    gabapentin  200 mg Oral BID    hydrALAZINE  25 mg Oral TID    hydroCHLOROthiazide  25 mg Oral Daily    insulin aspart U-100  15 Units Subcutaneous TID WM    insulin detemir U-100  35 Units Subcutaneous QHS    isosorbide dinitrate  10 mg Oral TID    losartan  100 mg Oral Daily    metroNIDAZOLE  500 mg Oral Q8H    mupirocin   Nasal BID    NIFEdipine  60 mg Oral Daily    pantoprazole  40 mg Oral Daily    sodium chloride 0.9%  10 mL Intravenous Q6H    tamsulosin  0.4 mg Oral Daily    vancomycin (VANCOCIN) IVPB  750 mg Intravenous Q12H         Infusions:       PRN:  sodium chloride 0.9%, dextrose 10%, dextrose 10%, dextrose, dextrose, glucagon (human recombinant), insulin aspart U-100, naloxone, sodium chloride 0.9%, Flushing PICC Protocol **AND** sodium chloride 0.9% **AND** sodium chloride 0.9%, Pharmacy to dose Vancomycin consult **AND** vancomycin - pharmacy to dose    Antibiotics and Day Number of Therapy:  Antibiotics (From admission, onward)      Start     Stop Route Frequency Ordered    03/14/23 2230  cefTRIAXone (ROCEPHIN) 2 g in dextrose 5 % in water (D5W) 5 % 50 mL IVPB (MB+)         -- IV Every 24 hours (non-standard times) 03/14/23 1428    03/14/23 1400  vancomycin 750 mg in dextrose 5 % (D5W) 250 mL IVPB (Vial-Mate)         -- IV Every 12 hours (non-standard times) 03/14/23 1257    03/13/23 0900  mupirocin 2 % ointment         03/18 0859 Nasl 2 times daily 03/13/23 0543    03/10/23 2200  metroNIDAZOLE tablet 500 mg          -- Oral Every 8 hours 03/10/23 1627    03/10/23 1725  vancomycin - pharmacy to dose  (vancomycin IVPB)        See Ham for full Linked Orders Report.    -- IV pharmacy to manage frequency 03/10/23 1627          Assessment:     Saji Castañeda is a 73 y.o. male with a PMHx of chronic LLE osteomyelitis,T2DM c/b neuropathy, PVD, DVT in RUE (June 2022), HTN, CKD, Mobitz Type 1, NSTEMI (9/2022) presenting with worsening BLE wounds that has been getting worse over the past 3 wks. Pt will be admitted to [facility] for the evaluation and management of chronic BLE wounds w/ superimposed cellulitis    Plan:     Chronic BLE wounds w/ superimposed cellulitis  Per Wound Care, pt has new R anterior ankle wounds w/ tendon exposure & a known L plantar foot wound w/ palpable bone in L heel from previous visit. Pt sent to ED for possible IV abx +/- debridement.  Afebrile w/ no elevations in WBC and ANC, however cannot r/o systemic infxn given pt's poor immune status 2/2 long T2DM hx  Consulted ID for abx management/regiment given hx of receiving dapto. per ID:  Pt needed PICC line (placed on 3/13 in [facility])  Vanc & Ceftriaxone IV along w/ Flagyl PO for ~6wks (last Vanc Trough 15.6 on 3/15)  End of IV abx date: 4/20/23  Weekly outpt labs on Mon or Tues while on IV abx:  CBC, CMP or BMP, ESR, CRP, CPK, and Vanc trough (goal: 15-20)  Appreciate recs  MRI R Ankle showed no acute osteomyelitis but showed large soft tissue ulcer of the dorsal aspect of the hindfoot and ankle with exposure of the extensor hallucis tendon w/ surrounding soft tissue edema and cellulitis.   BLE U/S showed no evidence of vasc occlusion/stenosis in RLE/LLE arterial system, but does display findings suggestive of vasc ds  MRI L Foot showed changes of osteomyelitis involving the posterior calcaneus where there is a large soft tissue defect.  It is similar to 02/19/2021 examination.  Consulted Podiatry for L heel osteo eval; per Podiatry:  Subacute osteomyelitis of  left foot  Ulcer improving today to right anterior ankle, continues to probe to bone, however signs of infection appear to be resolving and increased granular tissue noted. Recommend wound vac application. Orders placed.   Cultures previously taken, recommend 6 weeks of IV antibiotics for osteomyelitis   Acute osteomyelitis of right ankle  MRI reviewed with osteomyelitis to left heel. Heel wound without acute signs of infection, site with bone exposure. Discussed surgical vs conservative treatment options with amputation vs conservative treatment with 6 weeks of IV antibiotics with no guarantee of resolution. Patient elects to continue with antibiotics for now. Bone debridement previously performed with cultures taken. Antibiotic management per Infectious Disease. He will require 6 weeks of IV antibiotics.   Continue z-flex boots to both lower extremities, he is NWB to bilateral lower extremity.   Nursing orders in for wound care to left heel, upon d/c home health vs nursing to continue same dressing changes q3x per week. Wound vac change q3x per week, orders to follow. He is to continue follow up at wound clinic with Dr. Farrell  Appreciate recs  Consulted Surgery for BKA eval; per Surgery:  Dr Almanzar has been discussing w/ pt about BKA however pt has been refusing so far.  Continue IV Abx and local wound care  Appreciate recs  3/10 Aerobic B-Cx grew Proteus Mirabilis. 3/12 BC x2 NGTD  3/10 Bone Cx showed no anaerobic growth    T2DM w/ Neuropathy  Last A1c 9.9 about 5 months ago  Home Metformin and Rosuvastatin w/ insulin  POCT glucose checks  A1c 8.4  Currently on Atorvastatin and SSI  Continue home Gabapentin for neuropathy  Continue home Lasix and Tamsulosin for hx inability to empty bladder 2/2 neuropathy     PAD w/ hx of DVT in RUE  DVT in RUE in June 2022  DAPT  Continue Eliquis     HTN  Continue home Hydralazine, Isodil, Losartan, and Nifedipine.     Hx of Type 1 Mobitz  Avoid BB  Unremarkable ROS and PE  "suggestive of cardiac process at this time  CTM     Hx of NSTEMI  Hx of NSTEMI in 9/2022  TTE during NSTEMI work-up showed LVEF ~60% w/ notable Pulm HTN (PA systolic pressure 41mmHg)  TTE 3/11 showed LVEF ~70% w/ no other acute/significant changes from previous  Unremarkable ROS and PE suggestive of cardiac process at this time  CTM     Ppx: Apixaban  Diet: Diabetic  Code: Full     Disposition: medically stable at this time; pending discharge disposition.    Vi Bains MD (Nak)  [facility] Internal Medicine, PGY-1    [facility] Medicine Hospitalist Pager numbers:   [facility] Hospitalist Medicine Team A (Lorne/Riki): [phone]  [facility] Hospitalist Medicine Team B (Arik/Lucio):  [phone]      "

## 2023-03-17 NOTE — PT/OT/SLP PROGRESS
Occupational Therapy   Treatment    Name: Saji Castañeda  MRN: [id number]  Admitting Diagnosis:  Acute osteomyelitis of right ankle       Recommendations:     Discharge Recommendations: (post acute placement)  Discharge Equipment Recommendations:  wheelchair (pt needs a new w/c, brakes broken on one he has in room (donated from friend), not sure when pt recieved a w/c last.), drop arm BSC, sliding board  Barriers to discharge:  Other (Comment) (Increased level of assistance)    Assessment:     Saji Castañeda is a 73 y.o. male with a medical diagnosis of Acute osteomyelitis of right ankle.  Performance deficits affecting function are weakness, impaired endurance, impaired self care skills, impaired functional mobility, gait instability, impaired balance, decreased upper extremity function, decreased lower extremity function, pain, decreased ROM, impaired skin, edema, orthopedic precautions, impaired sensation.   Pt found in bed, agreeable to therapy.  Pt tolerated therapy well. Rec post acute at time of discharge to maximize [place] and safety with ADL's and functional mobility. Continue OT services to address functional goals, progressing as able.      Rehab Prognosis:  Good; patient would benefit from acute skilled OT services to address these deficits and reach maximum level of function.       Plan:     Patient to be seen 3 x/week to address the above listed problems via self-care/home management, therapeutic activities, therapeutic exercises  Plan of Care Expires: 04/16/23  Plan of Care Reviewed with: patient    Subjective     Pain/Comfort:  Pain Rating 1:  (increased pain in R toes-did not rate-RN notified)    Objective:     Communicated with: RN prior to session.  Patient found HOB elevated with Condom Catheter, peripheral IV, telemetry upon OT entry to room.    General Precautions: Standard, fall    Orthopedic Precautions:RLE non weight bearing, LLE non weight bearing  Braces: N/A  Respiratory Status: Room  air     Occupational Performance:     Bed Mobility:    Patient completed Rolling/Turning to Right with minimum assistance use of bed rail  Patient completed Scooting/Bridging with stand by assistance to scoot seated to EOB  Patient completed Supine to Sit with minimum assistance to offload RLE and prevent shearing.     Functional Mobility/Transfers:  Deferred transfer 2/2 pts personal wheelchair in room does not lock and is not safe to transfer using a sliding board.     [facility] 6 Click ADL: 17    Treatment & Education:  Pt sat EOB at Mod I level while performing BUE AROM ex x 10 reps for shld flex, scap retractions, hori abd/add.   Pt scooted seated to R side with Min A to maintain RLE NWB'ing in preparation for transfers using SB.   Slight skin tear noted on back of R calf.  RN notified and assessed.     Patient left sitting edge of bed with all lines intact, call button in reach, bed alarm on, and RN notified    GOALS:   Multidisciplinary Problems       Occupational Therapy Goals          Problem: Occupational Therapy    Goal Priority Disciplines Outcome Interventions   Occupational Therapy Goal     OT, PT/OT Ongoing, Progressing    Description: Goals to be met by: 4/16/2023     Patient will increase functional independence with ADLs by performing:    UE Dressing with Set-up Assistance.  Grooming while seated with Set-up Assistance.  Toileting from drop-arm bedside commode with Supervision for hygiene and clothing management.   Rolling to Bilateral with Modified [place].   Supine to sit with Modified [place].  Toilet transfer to bedside commode with Minimal Assistance & use of slide board.                         Time Tracking:     OT Date of Treatment: 03/17/23  OT Start Time: 1120  OT Stop Time: 1145  OT Total Time (min): 25 min    Billable Minutes:Therapeutic Activity 15  Therapeutic Exercise 10            3/17/2023

## 2023-03-17 NOTE — PLAN OF CARE
Problem: Adult Inpatient Plan of Care  Goal: Plan of Care Review  Outcome: Ongoing, Progressing  Goal: Patient-Specific Goal (Individualized)  Outcome: Ongoing, Progressing  Goal: Readiness for Transition of Care  Outcome: Ongoing, Progressing     Problem: Bariatric Environmental Safety  Goal: Safety Maintained with Care  Outcome: Ongoing, Progressing     Problem: Diabetes Comorbidity  Goal: Blood Glucose Level Within Targeted Range  Outcome: Ongoing, Progressing     Problem: Impaired Wound Healing  Goal: Optimal Wound Healing  Outcome: Ongoing, Progressing     Problem: Fall Injury Risk  Goal: Absence of Fall and Fall-Related Injury  Outcome: Ongoing, Progressing     Problem: Skin or Soft Tissue Infection  Goal: Absence of Infection Signs and Symptoms  Outcome: Ongoing, Progressing     Problem: Infection  Goal: Absence of Infection Signs and Symptoms  Outcome: Ongoing, Progressing

## 2023-03-17 NOTE — PROGRESS NOTES
Nurse reports bleeding from R calf.  Upon assessment with Dr. Elise's  resident at bedside- noted hardened adhere pustule lesion draining blood- no erythema- mild fluctuance- pt denies pain to site.  Resident advised to notify Dr. Orozco- sent secure chat with photo- Dr. Orozco advised nursing to manage dressing changes to site using hydrofera blue- nurse notified.

## 2023-03-17 NOTE — PLAN OF CARE
Problem: Adult Inpatient Plan of Care  Goal: Plan of Care Review  Outcome: Ongoing, Progressing  Goal: Patient-Specific Goal (Individualized)  Outcome: Ongoing, Progressing  Goal: Absence of Hospital-Acquired Illness or Injury  Outcome: Ongoing, Progressing  Goal: Readiness for Transition of Care  Outcome: Ongoing, Progressing     Problem: Impaired Wound Healing  Goal: Optimal Wound Healing  Outcome: Ongoing, Progressing     Problem: Fall Injury Risk  Goal: Absence of Fall and Fall-Related Injury  Outcome: Ongoing, Progressing     Problem: Skin or Soft Tissue Infection  Goal: Absence of Infection Signs and Symptoms  Outcome: Ongoing, Progressing     Problem: Skin Injury Risk Increased  Goal: Skin Health and Integrity  Outcome: Ongoing, Progressing     Problem: Infection  Goal: Absence of Infection Signs and Symptoms  Outcome: Ongoing, Progressing

## 2023-03-17 NOTE — PLAN OF CARE
Placement efforts continue   CM spoke with pt's sister and caregiver Ms. Gaitan today --Kandy Castañeda  Sister  [phone]  - advised that efforts are being made to place pt due to the severity of his wound involving bone, tendon and a wound vac and need for IV abx.    CM Supervisor BEREKET Ramires has sent additional referrals.    NO d/c this weekend as pt does not yet have placement   [facility] declined per insurance .  Seven SNF's declined pt. due to inability to meet pt needs.         03/17/23 1414   Post-Acute Status   Post-Acute Authorization Placement   Post-Acute Placement Status Referrals Sent   Discharge Plan   Discharge Plan A Long-term acute care facility (LTAC)   Discharge Plan B Skilled Nursing Facility

## 2023-03-17 NOTE — PT/OT/SLP PROGRESS
Physical Therapy Treatment    Patient Name:  Saji Castañeda   MRN:  [id number]    Recommendations:     Discharge Recommendations:  (TBD pending pt progress), though appears he will need post acute placement  Discharge Equipment Recommendations: wheelchair (pt needs a new w/c, brakes broken on one he has in room (donated from friend), not sure when pt recieved a w/c last.)  Barriers to discharge: Decreased caregiver support    Assessment:     Saji Castañeda is a 73 y.o. male admitted with a medical diagnosis of Acute osteomyelitis of right ankle.  He presents with the following impairments/functional limitations: weakness, impaired endurance, impaired self care skills, impaired functional mobility, impaired balance, decreased lower extremity function, decreased upper extremity function, pain, impaired skin, edema, orthopedic precautions ; pt with fair mobility today, limited session due to pt's w/c being broken and unable to perform t/f's to it. Also, R calf w/ bright red blood dripping onto floor, nsg. Notified and will redress LE's once pt back in bed.    Rehab Prognosis: Good; patient would benefit from acute skilled PT services to address these deficits and reach maximum level of function.    Recent Surgery: * No surgery found *      Plan:     During this hospitalization, patient to be seen 3 x/week to address the identified rehab impairments via therapeutic activities, therapeutic exercises, neuromuscular re-education, wheelchair management/training and progress toward the following goals:    Plan of Care Expires:  04/16/23    Subjective     Chief Complaint: pain in R toes, ever since woundvac was applied  Patient/Family Comments/goals: pt agreeable to session  Pain/Comfort:  Pain Rating 1:  (did not rate)  Location - Side 1: Right  Location 1:  (toes)  Pain Addressed 1: Reposition, Distraction, Nurse notified      Objective:     Communicated with nurse prior to session.  Patient found sitting edge of bed with TAYLOR  "present, woundvac Condom Catheter, peripheral IV, telemetry upon PT entry to room.     General Precautions: Standard, fall  Orthopedic Precautions: RLE non weight bearing, LLE non weight bearing  Braces: N/A  Respiratory Status: Room air     Functional Mobility:  Bed Mobility:     Scooting: stand by assistance and pt scooting in bed to R side, using UE's and bedrail to assist, w/ SBA and cueing.       AM-PAC 6 CLICK MOBILITY  Turning over in bed (including adjusting bedclothes, sheets and blankets)?: 3  Sitting down on and standing up from a chair with arms (e.g., wheelchair, bedside commode, etc.): 1  Moving from lying on back to sitting on the side of the bed?: 3  Moving to and from a bed to a chair (including a wheelchair)?: 1  Need to walk in hospital room?: 1  Climbing 3-5 steps with a railing?: 1  Basic Mobility Total Score: 10       Treatment & Education:  Perf'd seated hip flex, LAQ"s x 5 ea.   OT present and working on UE strengthening as well.    Patient left sitting edge of bed with all lines intact, call button in reach, and nurse notified..    GOALS:   Multidisciplinary Problems       Physical Therapy Goals          Problem: Physical Therapy    Goal Priority Disciplines Outcome Goal Variances Interventions   Physical Therapy Goal     PT, PT/OT Ongoing, Progressing     Description: Goals to be met by: 23     Patient will increase functional independence with mobility by performin. Supine to sit with Stand-by Assistance  2. Sit to supine with Stand-by Assistance  3. Rolling to Left and Right with Stand-by Assistance.  4. Bed to chair transfer with Stand-by Assistance using Slideboard  5. Wheelchair propulsion x 80 feet with Modified Wilkinson using bilateral upper extremities                         Time Tracking:     PT Received On: 23  PT Start Time: 1125     PT Stop Time: 1145  PT Total Time (min): 10 min (10 min. OT w/ pt)    Billable Minutes: Therapeutic Exercise 10    Treatment " Type: Treatment  PT/PTA: PTA     Number of PTA visits since last PT visit: 1 03/17/2023

## 2023-03-18 LAB
POCT GLUCOSE: 151 MG/DL (ref 70–110)
POCT GLUCOSE: 153 MG/DL (ref 70–110)
POCT GLUCOSE: 174 MG/DL (ref 70–110)
POCT GLUCOSE: 235 MG/DL (ref 70–110)
VANCOMYCIN TROUGH SERPL-MCNC: 21.3 UG/ML (ref 10–22)

## 2023-03-18 PROCEDURE — A4216 STERILE WATER/SALINE, 10 ML: HCPCS | Performed by: STUDENT IN AN ORGANIZED HEALTH CARE EDUCATION/TRAINING PROGRAM

## 2023-03-18 PROCEDURE — 80202 ASSAY OF VANCOMYCIN: CPT | Performed by: INTERNAL MEDICINE

## 2023-03-18 PROCEDURE — 63600175 PHARM REV CODE 636 W HCPCS: Performed by: STUDENT IN AN ORGANIZED HEALTH CARE EDUCATION/TRAINING PROGRAM

## 2023-03-18 PROCEDURE — 25000003 PHARM REV CODE 250

## 2023-03-18 PROCEDURE — 25000003 PHARM REV CODE 250: Performed by: INTERNAL MEDICINE

## 2023-03-18 PROCEDURE — 25000003 PHARM REV CODE 250: Performed by: STUDENT IN AN ORGANIZED HEALTH CARE EDUCATION/TRAINING PROGRAM

## 2023-03-18 PROCEDURE — 11000001 HC ACUTE MED/SURG PRIVATE ROOM

## 2023-03-18 PROCEDURE — 97110 THERAPEUTIC EXERCISES: CPT | Mod: CQ

## 2023-03-18 PROCEDURE — 63600175 PHARM REV CODE 636 W HCPCS: Mod: TB,JG | Performed by: INTERNAL MEDICINE

## 2023-03-18 PROCEDURE — 97530 THERAPEUTIC ACTIVITIES: CPT | Mod: CQ

## 2023-03-18 RX ADMIN — APIXABAN 5 MG: 5 TABLET, FILM COATED ORAL at 10:03

## 2023-03-18 RX ADMIN — HYDRALAZINE HYDROCHLORIDE 25 MG: 25 TABLET, FILM COATED ORAL at 02:03

## 2023-03-18 RX ADMIN — SODIUM CHLORIDE, PRESERVATIVE FREE 10 ML: 5 INJECTION INTRAVENOUS at 05:03

## 2023-03-18 RX ADMIN — CEFTRIAXONE SODIUM 2 G: 2 INJECTION, POWDER, FOR SOLUTION INTRAMUSCULAR; INTRAVENOUS at 10:03

## 2023-03-18 RX ADMIN — METRONIDAZOLE 500 MG: 500 TABLET ORAL at 01:03

## 2023-03-18 RX ADMIN — OXYCODONE HYDROCHLORIDE AND ACETAMINOPHEN 500 MG: 500 TABLET ORAL at 08:03

## 2023-03-18 RX ADMIN — INSULIN ASPART 15 UNITS: 100 INJECTION, SOLUTION INTRAVENOUS; SUBCUTANEOUS at 08:03

## 2023-03-18 RX ADMIN — LOSARTAN POTASSIUM 100 MG: 50 TABLET, FILM COATED ORAL at 08:03

## 2023-03-18 RX ADMIN — SODIUM CHLORIDE: 9 INJECTION, SOLUTION INTRAVENOUS at 01:03

## 2023-03-18 RX ADMIN — NIFEDIPINE 60 MG: 30 TABLET, FILM COATED, EXTENDED RELEASE ORAL at 08:03

## 2023-03-18 RX ADMIN — HYDROCHLOROTHIAZIDE 25 MG: 25 TABLET ORAL at 08:03

## 2023-03-18 RX ADMIN — HYDRALAZINE HYDROCHLORIDE 25 MG: 25 TABLET, FILM COATED ORAL at 10:03

## 2023-03-18 RX ADMIN — ISOSORBIDE DINITRATE 10 MG: 10 TABLET ORAL at 02:03

## 2023-03-18 RX ADMIN — INSULIN DETEMIR 35 UNITS: 100 INJECTION, SOLUTION SUBCUTANEOUS at 10:03

## 2023-03-18 RX ADMIN — HYDRALAZINE HYDROCHLORIDE 25 MG: 25 TABLET, FILM COATED ORAL at 08:03

## 2023-03-18 RX ADMIN — ISOSORBIDE DINITRATE 10 MG: 10 TABLET ORAL at 08:03

## 2023-03-18 RX ADMIN — GABAPENTIN 200 MG: 100 CAPSULE ORAL at 10:03

## 2023-03-18 RX ADMIN — GABAPENTIN 200 MG: 100 CAPSULE ORAL at 08:03

## 2023-03-18 RX ADMIN — METRONIDAZOLE 500 MG: 500 TABLET ORAL at 05:03

## 2023-03-18 RX ADMIN — ISOSORBIDE DINITRATE 10 MG: 10 TABLET ORAL at 10:03

## 2023-03-18 RX ADMIN — SODIUM CHLORIDE, PRESERVATIVE FREE 10 ML: 5 INJECTION INTRAVENOUS at 12:03

## 2023-03-18 RX ADMIN — APIXABAN 5 MG: 5 TABLET, FILM COATED ORAL at 08:03

## 2023-03-18 RX ADMIN — PANTOPRAZOLE SODIUM 40 MG: 40 TABLET, DELAYED RELEASE ORAL at 08:03

## 2023-03-18 RX ADMIN — SODIUM CHLORIDE, PRESERVATIVE FREE 10 ML: 5 INJECTION INTRAVENOUS at 11:03

## 2023-03-18 RX ADMIN — CLOPIDOGREL BISULFATE 75 MG: 75 TABLET ORAL at 08:03

## 2023-03-18 RX ADMIN — INSULIN ASPART 15 UNITS: 100 INJECTION, SOLUTION INTRAVENOUS; SUBCUTANEOUS at 11:03

## 2023-03-18 RX ADMIN — VANCOMYCIN HYDROCHLORIDE 1250 MG: 1.25 INJECTION, POWDER, LYOPHILIZED, FOR SOLUTION INTRAVENOUS at 01:03

## 2023-03-18 RX ADMIN — INSULIN ASPART 2 UNITS: 100 INJECTION, SOLUTION INTRAVENOUS; SUBCUTANEOUS at 05:03

## 2023-03-18 RX ADMIN — INSULIN ASPART 4 UNITS: 100 INJECTION, SOLUTION INTRAVENOUS; SUBCUTANEOUS at 11:03

## 2023-03-18 RX ADMIN — TAMSULOSIN HYDROCHLORIDE 0.4 MG: 0.4 CAPSULE ORAL at 08:03

## 2023-03-18 RX ADMIN — SODIUM CHLORIDE, PRESERVATIVE FREE 10 ML: 5 INJECTION INTRAVENOUS at 06:03

## 2023-03-18 RX ADMIN — METRONIDAZOLE 500 MG: 500 TABLET ORAL at 10:03

## 2023-03-18 RX ADMIN — INSULIN ASPART 15 UNITS: 100 INJECTION, SOLUTION INTRAVENOUS; SUBCUTANEOUS at 05:03

## 2023-03-18 RX ADMIN — ASPIRIN 81 MG: 81 TABLET, COATED ORAL at 08:03

## 2023-03-18 RX ADMIN — ATORVASTATIN CALCIUM 80 MG: 40 TABLET, FILM COATED ORAL at 08:03

## 2023-03-18 NOTE — PHYSICIAN QUERY
PT Name: Saji Castañeda  MR #: [id number]     Documentation Clarification      CDS/: Gilma Aranda RN BSN             Contact information:[email or web address]    This form is a permanent document in the medical record.     Query Date: March 18, 2023    By submitting this query, we are merely seeking further clarification of documentation. Please utilize your independent clinical judgment when addressing the question(s) below.    The Medical Record reflects the following:    Supporting Clinical Findings Location in Medical Record   Location:  Right Ankle    Type of Debridement:  Excisional  Tissue debrided:  Subcutaneous, Bone, Muscle and Tendon 3/10/23 Procedure                                                                                Provider, please specify bone that was debrided in Right Ankle:    [   ] Tibia   [   ] Fibula   [  x ] Talus   [   ] Other (please specify): ____________

## 2023-03-18 NOTE — PLAN OF CARE
Problem: Physical Therapy  Goal: Physical Therapy Goal  Description: Goals to be met by: 23     Patient will increase functional independence with mobility by performin. Supine to sit with Stand-by Assistance  2. Sit to supine with Stand-by Assistance  3. Rolling to Left and Right with Stand-by Assistance.  4. Bed to chair transfer with Stand-by Assistance using Slideboard  5. Wheelchair propulsion x 80 feet with Modified Nuckolls using bilateral upper extremities    Outcome: Ongoing, Progressing   Pt continues to work toward all goals. Able to sit at EOB ~18-20 minutes with SBA/sup. Able to perform BUE and BLE therapeutic exercises 1 x 10 reps each exercise with AROM-AAROM. Able to scoot along EOB x 3-5 reps with CGA, PTA holding BLE's off of floor while pt advancing to HOB.

## 2023-03-18 NOTE — PLAN OF CARE
Problem: Adult Inpatient Plan of Care  Goal: Plan of Care Review  Outcome: Ongoing, Progressing  Goal: Patient-Specific Goal (Individualized)  Outcome: Ongoing, Progressing  Goal: Absence of Hospital-Acquired Illness or Injury  Outcome: Ongoing, Progressing  Goal: Optimal Comfort and Wellbeing  Outcome: Ongoing, Progressing  Goal: Readiness for Transition of Care  Outcome: Ongoing, Progressing     Problem: Bariatric Environmental Safety  Goal: Safety Maintained with Care  Outcome: Ongoing, Progressing     Problem: Diabetes Comorbidity  Goal: Blood Glucose Level Within Targeted Range  Outcome: Ongoing, Progressing     Problem: Diabetes Comorbidity  Goal: Blood Glucose Level Within Targeted Range  Outcome: Ongoing, Progressing     Problem: Impaired Wound Healing  Goal: Optimal Wound Healing  Outcome: Ongoing, Progressing     Problem: Hypertension Comorbidity  Goal: Blood Pressure in Desired Range  Outcome: Ongoing, Progressing     Problem: Fall Injury Risk  Goal: Absence of Fall and Fall-Related Injury  Outcome: Ongoing, Progressing     Problem: Skin or Soft Tissue Infection  Goal: Absence of Infection Signs and Symptoms  Outcome: Ongoing, Progressing     Problem: Skin Injury Risk Increased  Goal: Skin Health and Integrity  Outcome: Ongoing, Progressing     Problem: Infection  Goal: Absence of Infection Signs and Symptoms  Outcome: Ongoing, Progressing      snapShot printed and signed  Anh Spivey CNP

## 2023-03-18 NOTE — PT/OT/SLP PROGRESS
"Physical Therapy Treatment    Patient Name:  Saji Castañeda   MRN:  [id number]    Recommendations:     Discharge Recommendations:  (TBD pending progress though appears he will need post acute placement)  Discharge Equipment Recommendations: wheelchair (Pt needs a new wheelchair, brakes are broken on one side. Not sure when pt last received a wheelchair.)  Barriers to discharge: Inaccessible home, Decreased caregiver support, and increased assistance needed    Assessment:     Saji Castañeda is a 73 y.o. male admitted with a medical diagnosis of Acute osteomyelitis of right ankle.  He presents with the following impairments/functional limitations: weakness, impaired endurance, impaired self care skills, impaired functional mobility, gait instability, impaired balance, decreased coordination, decreased upper extremity function, decreased lower extremity function, decreased safety awareness, pain, decreased ROM, impaired coordination, impaired skin, edema, orthopedic precautions, impaired sensation. Pt able to sit at EOB ~18-20 minutes with SBA/Sup. Pt performed BUE and BLE therapeutic exercises 1 x 10 reps AROM-AAROM. Would benefit from continued PT services to increase pt's transfers out of bed increasing pt's wheelchair functional mobility.    Rehab Prognosis: Good; patient would benefit from acute skilled PT services to address these deficits and reach maximum level of function.    Recent Surgery: * No surgery found *      Plan:     During this hospitalization, patient to be seen 3 x/week to address the identified rehab impairments via therapeutic activities, therapeutic exercises, neuromuscular re-education, wheelchair management/training and progress toward the following goals:    Plan of Care Expires:  04/16/23    Subjective     Chief Complaint: None Expressed  Patient/Family Comments/goals: "Yes, I will work with you".  Pain/Comfort:  Pain Rating 1:  (Did not complain of pain)  Pain Rating Post-Intervention 1:  " (Did not complain of pain)      Objective:     Communicated with nurse prior to session.  Patient found supine with bed alarm, Condom Catheter, telemetry upon PT entry to room.     General Precautions: Standard, fall  Orthopedic Precautions: RLE non weight bearing, LLE non weight bearing  Braces: N/A  Respiratory Status: Room air     Functional Mobility:  Bed Mobility:     Rolling Left:  stand by assistance  Rolling Right: stand by assistance  Scooting: supervision and stand by assistance  Supine to Sit: stand by assistance  Sit to Supine: stand by assistance, contact guard assistance, and slight assistance to advance BLE's      AM-PAC 6 CLICK MOBILITY  Turning over in bed (including adjusting bedclothes, sheets and blankets)?: 4  Sitting down on and standing up from a chair with arms (e.g., wheelchair, bedside commode, etc.): 1  Moving from lying on back to sitting on the side of the bed?: 4  Moving to and from a bed to a chair (including a wheelchair)?: 1  Need to walk in hospital room?: 1  Climbing 3-5 steps with a railing?: 1  Basic Mobility Total Score: 12       Treatment & Education:  Pt able to sit at EOB ~18-20 minutes with SBA/Sup. Able to perform BLE's AROM consisting of hip add/abd and LAQ's 1 x 10 reps each. Received gentle manual stretching to BLE hamstrings 2 sets of 5 reps. BUE therapeutic exercises 1 x 10 reps each exercise consisting of AAROM for shoulder internal/external shoulder rotation with elbow in flexion and at 90 degrees, AROM shoulder flexion/extension, and elbow flexion (bicep curls). Pt able to scoot along EOB to reach HOB 3-5 scoots using BUE's with PTA holding BLE's off of floor while advancing to the side.    Patient left supine with all lines intact, call button in reach, bed alarm on, and nurse notified..    GOALS:   Multidisciplinary Problems       Physical Therapy Goals          Problem: Physical Therapy    Goal Priority Disciplines Outcome Goal Variances Interventions   Physical  Therapy Goal     PT, PT/OT Ongoing, Progressing     Description: Goals to be met by: 23     Patient will increase functional independence with mobility by performin. Supine to sit with Stand-by Assistance  2. Sit to supine with Stand-by Assistance  3. Rolling to Left and Right with Stand-by Assistance.  4. Bed to chair transfer with Stand-by Assistance using Slideboard  5. Wheelchair propulsion x 80 feet with Modified Mora using bilateral upper extremities                         Time Tracking:     PT Received On: 23  PT Start Time: 918     PT Stop Time: 947  PT Total Time (min): 29 min     Billable Minutes: Therapeutic Activity 9 and Therapeutic Exercise 20    Treatment Type: Treatment  PT/PTA: PTA     Number of PTA visits since last PT visit: 2     2023

## 2023-03-18 NOTE — PHYSICIAN QUERY
PT Name: Saji Castañeda  MR #: [id number]     Documentation Clarification      CDS/: Gilma Aranda RN BSN             Contact information:[email or web address]    This form is a permanent document in the medical record.     Query Date: March 18, 2023    By submitting this query, we are merely seeking further clarification of documentation. Please utilize your independent clinical judgment when addressing the question(s) below.    The Medical Record reflects the following:    Supporting Clinical Findings Location in Medical Record   Location:  Left Heel    Type of Debridement:  Excisional    Depth of debridement:  Bone    Tissue debrided:  Subcutaneous, Other and Bone 3/10/23 Procedure                                                                                Provider, please specify bone that was debrided Left Heel:    [x] Calcaneus   [   ] Other (please specify): ____________

## 2023-03-18 NOTE — PROGRESS NOTES
Pharmacokinetic Assessment Follow Up: IV Vancomycin    Vancomycin serum concentration assessment(s):    The trough level was drawn correctly and can be used to guide therapy at this time. The measurement is above the desired definitive target range of 15 to 20 mcg/mL.    Vancomycin Regimen Plan:    Change regimen to Vancomycin 1250 mg IV every 24 hours with next serum trough concentration measured at 1200 prior to 3rd  dose on 3/20    Drug levels (last 3 results):  Recent Labs   Lab Result Units 03/15/23  1344 03/18/23  0033   Vancomycin-Trough ug/mL 15.6 21.3       Pharmacy will continue to follow and monitor vancomycin.    Please contact pharmacy at extension [phone] for questions regarding this assessment.    Thank you for the consult,   Amena Justice       Patient brief summary:  Saji Castañeda is a 73 y.o. male initiated on antimicrobial therapy with IV Vancomycin for treatment of bone/joint infection    The patient's current regimen is changed to 1250 mg Q 24 hrs     Drug Allergies:   Review of patient's allergies indicates:  No Known Allergies    Actual Body Weight:   122.9 kg     Renal Function:   Estimated Creatinine Clearance: 49.4 mL/min (A) (based on SCr of 1.7 mg/dL (H)).,       CBC (last 72 hours):  Recent Labs   Lab Result Units 03/17/23  0411   WBC K/uL 8.46   Hemoglobin g/dL 8.2*   Hematocrit % 26.0*   Platelets K/uL 482*   Gran % % 70.8   Lymph % % 14.4*   Mono % % 8.2   Eosinophil % % 5.6   Basophil % % 0.4   Differential Method  Automated       Metabolic Panel (last 72 hours):  Recent Labs   Lab Result Units 03/15/23  0759 03/17/23  0411   Sodium mmol/L 134* 133*   Potassium mmol/L 4.4 4.1   Chloride mmol/L 100 98   CO2 mmol/L 25 26   Glucose mg/dL 128* 254*   BUN mg/dL 30* 36*   Creatinine mg/dL 1.6* 1.7*   Albumin g/dL  --  2.0*   Total Bilirubin mg/dL  --  0.1   Alkaline Phosphatase U/L  --  52*   AST U/L  --  17   ALT U/L  --  16       Vancomycin Administrations:  vancomycin given in the last  96 hours                     vancomycin 750 mg in dextrose 5 % (D5W) 250 mL IVPB (Vial-Mate) (mg) 750 mg New Bag 03/17/23 1325     750 mg New Bag  0206     750 mg New Bag 03/16/23 1507     750 mg New Bag  0243     750 mg New Bag 03/15/23 1421     750 mg New Bag  0110     750 mg New Bag 03/14/23 1421                    Microbiologic Results:  Microbiology Results (last 7 days)       Procedure Component Value Units Date/Time    Blood culture [[id number]] Collected: 03/12/23 0647    Order Status: Completed Specimen: Blood from Antecubital, Left Updated: 03/17/23 1412     Blood Culture, Routine No growth after 5 days.    Blood culture [[id number]] Collected: 03/12/23 0648    Order Status: Completed Specimen: Blood Updated: 03/17/23 1412     Blood Culture, Routine No growth after 5 days.    Blood culture #2 **CANNOT BE ORDERED STAT** [[id number]] Collected: 03/10/23 0608    Order Status: Completed Specimen: Blood Updated: 03/15/23 1612     Blood Culture, Routine No growth after 5 days.    Culture, Anaerobe [[id number]] Collected: 03/10/23 1741    Order Status: Completed Specimen: Wound from Leg, Left Updated: 03/15/23 1103     Anaerobic Culture No anaerobes isolated    Aerobic culture [[id number]]  (Abnormal)  (Susceptibility) Collected: 03/10/23 1741    Order Status: Completed Specimen: Wound from Leg, Left Updated: 03/13/23 1219     Aerobic Bacterial Culture PROTEUS MIRABILIS  Few  No other significant isolate      Narrative:      Left heel wound    Blood culture #1 **CANNOT BE ORDERED STAT** [[id number]]  (Abnormal)  (Susceptibility) Collected: 03/09/23 1525    Order Status: Completed Specimen: Blood from Peripheral, Hand, Right Updated: 03/13/23 1013     Blood Culture, Routine Gram stain aer bottle: Gram positive cocci in clusters resembling Staph      Results called to and read back by:Hamlet Mullen RN 03/10/2023  20:36      STAPHYLOCOCCUS SPECIES  Further identified as Staphylococcus pasteuri

## 2023-03-18 NOTE — PROGRESS NOTES
"[facility] Medicine Progress Note    Primary Team: [facility] Hospitalist Team B  Attending Physician: Vinod Elise MD;Kumar HAGEN  Resident: Seymour Yan MD  Intern: Vi Bains MD (Nak)    Hospital Day: 7 days    Chief Complaint: Chronic BLE wounds w/ superimposed cellulitis worsening over 3wks    Subjective:   NAEON. Afebrile w/ stable vitals on room air. Pt seen by writer this morning. Pt had wound vac placed on RLE for bleeds w/o issues or concerns voiced. Pt denied fevers, chills, N/V, SOB, chest pain, abd pain, BLE pains, or numbness/weakness.     Review of Systems   All other systems reviewed and are negative.      Objective:   Last 24 Hour Vital Signs:  BP  Min: 121/60  Max: 175/71  Temp  Av.4 °F (36.3 °C)  Min: 96.7 °F (35.9 °C)  Max: 97.9 °F (36.6 °C)  Pulse  Av.2  Min: 47  Max: 80  Resp  Av  Min: 17  Max: 20  SpO2  Av %  Min: 95 %  Max: 97 %    Intake/Output Summary (Last 24 hours) at 3/18/2023 0745  Last data filed at 3/17/2023 1754  Gross per 24 hour   Intake 540 ml   Output 950 ml   Net -410 ml       Physical Examination:    Physical Exam   Constitutional: He is oriented to person, place, and time. normal appearance. He appears obese.  Non-toxic appearance. No distress. He does not appear ill.   HENT:   Mouth/Throat: Mucous membranes are moist. Oropharynx is clear.   Eyes: Pupils are equal, round, and reactive to light.   Cardiovascular: Normal rate, regular rhythm, normal heart sounds and normal pulses.  Pulmonary:     Effort: Pulmonary effort is normal.      Breath sounds: Normal breath sounds.   Abdominal: Soft. Normal appearance and bowel sounds are normal.   Musculoskeletal:         General: Swelling and deformity present on BLE; stable. Wound vac on RLE.      Comments: See media tab on 3/9/23   Neurological: He is alert and oriented to person, place, and time.   Skin: Skin is warm and dry. PICC line site in LUE c/d/i  See media tab on 3/9/23   Psychiatric: linear thought, " constricted affect;     Laboratory:  Recent Labs   Lab 03/13/23  0428 03/14/23  0813 03/15/23  0759 03/17/23  0411   WBC 8.03 7.58  --  8.46   HGB 8.1* 8.4*  --  8.2*   HCT 25.6* 27.4*  --  26.0*    426  --  482*   MCV 84 84  --  84   RDW 15.7* 15.7*  --  15.9*   * 134* 134* 133*   K 4.0 4.1 4.4 4.1    100 100 98   CO2 25 25 25 26   BUN 25* 29* 30* 36*   CREATININE 1.7* 1.6* 1.6* 1.7*   * 145* 128* 254*   PROT 7.3 7.5  --  7.4   ALBUMIN 1.9* 1.9*  --  2.0*   BILITOT 0.2 0.2  --  0.1   AST 22 16  --  17   ALKPHOS 54* 52*  --  52*   ALT 18 14  --  16       Microbiology Results (last 7 days)       Procedure Component Value Units Date/Time    Blood culture [[id number]] Collected: 03/12/23 0647    Order Status: Completed Specimen: Blood from Antecubital, Left Updated: 03/17/23 1412     Blood Culture, Routine No growth after 5 days.    Blood culture [[id number]] Collected: 03/12/23 0648    Order Status: Completed Specimen: Blood Updated: 03/17/23 1412     Blood Culture, Routine No growth after 5 days.    Blood culture #2 **CANNOT BE ORDERED STAT** [[id number]] Collected: 03/10/23 0608    Order Status: Completed Specimen: Blood Updated: 03/15/23 1612     Blood Culture, Routine No growth after 5 days.    Culture, Anaerobe [[id number]] Collected: 03/10/23 1741    Order Status: Completed Specimen: Wound from Leg, Left Updated: 03/15/23 1103     Anaerobic Culture No anaerobes isolated    Aerobic culture [[id number]]  (Abnormal)  (Susceptibility) Collected: 03/10/23 1741    Order Status: Completed Specimen: Wound from Leg, Left Updated: 03/13/23 1219     Aerobic Bacterial Culture PROTEUS MIRABILIS  Few  No other significant isolate      Narrative:      Left heel wound    Blood culture #1 **CANNOT BE ORDERED STAT** [[id number]]  (Abnormal)  (Susceptibility) Collected: 03/09/23 1525    Order Status: Completed Specimen: Blood from Peripheral, Hand, Right Updated: 03/13/23 1013     Blood Culture, Routine Gram  stain aer bottle: Gram positive cocci in clusters resembling Staph      Results called to and read back by:Hamlet Mullen RN 03/10/2023  20:36      STAPHYLOCOCCUS SPECIES  Further identified as Staphylococcus pasteuri            I have personally reviewed the above laboratory studies.     Imaging:  EKG (my interpretation): no indication for EKG this admit at this time    X-Ray Ankle Complete Right    Result Date: 3/9/2023  EXAMINATION: XR ANKLE COMPLETE 3 VIEW RIGHT CLINICAL HISTORY: Unspecified open wound, unspecified lower leg, initial encounter TECHNIQUE: AP, lateral, and oblique images of the right ankle were performed. COMPARISON: Right foot series 02/23/2022 FINDINGS: Chronic appearing deformity of the mid to distal femoral shaft presumably sequela of remote trauma.  No acute displaced fracture, dislocation or destructive osseous process.  There is DJD. Nonspecific soft tissue swelling about the distal right lower leg, ankle and dorsal hindfoot particular along the anterior and lateral aspect likely representing cellulitis.  No large soft tissue defect or subcutaneous emphysema seen.  Scattered atherosclerotic vascular and also nonspecific soft tissue calcifications noted.  No radiodense retained foreign body.  Small enthesophyte at the Achilles insertion site.     Distal right lower leg, ankle and hindfoot diffuse nonspecific soft tissue swelling from edema or cellulitis with superimposed more prominent soft tissue swelling along the anterolateral aspect of the distal lower leg.  Clinical correlation advised. Additional chronic appearing findings as above. Electronically signed by: Juan Hernandez MD Date:    03/09/2023 Time:    16:51    MRI Ankle Without Contrast Right    Result Date: 3/9/2023  EXAMINATION: MRI ANKLE WITHOUT CONTRAST RIGHT CLINICAL HISTORY: Soft tissue infection suspected, ankle, xray done; TECHNIQUE: MRI ankle performed per routine protocol without contrast. COMPARISON: MRI right lower extremity  from 09/01/2006.  MRI the contralateral ankle from 02/19/2021.  Radiographs of the right ankle from 03/09/2023 taken at 1543. FINDINGS: Bone: There is no evidence of acute osteomyelitis.  Marrow signal is normal.  No marrow edema or T1 hypointense signal. Articulations: There is cartilage loss of the joint between the navicular and the cuneiform bones with adjacent endplate edema and cystic change in the distal pole of the navicular.  Remaining articulations are grossly unremarkable.  There is no evidence of a large joint effusion. Tendons: Flexor, extensor, and peroneal tendons are grossly intact and demonstrate normal signal.  There is no evidence of tenosynovitis.  The Achilles tendon is intact. Ligaments: The anterior inferior and posteroinferior tibiofibular ligaments are intact.  The anterior talofibular ligament is intact.  The calcaneal fibular and posterior talofibular ligaments are intact.  The deltoid and spring ligaments are intact.  The Lisfranc ligament is not entirely included in the field of view. Soft tissues: There is a large dorsal soft tissue ulcer with exposure of the extensor hallucis tendon.  There is circumferential soft tissue edema the ankle extending into the dorsum the hindfoot.     1. No acute osteomyelitis. 2. Large soft tissue ulcer of the dorsal aspect of the hindfoot and ankle with exposure of the extensor hallucis tendon.  Surrounding soft tissue edema and cellulitis as above. Electronically signed by: Zane Galeana Date:    03/09/2023 Time:    19:45       Current Medications:     Scheduled:   apixaban  5 mg Oral BID    ascorbic acid (vitamin C)  500 mg Oral Daily    aspirin  81 mg Oral Daily    atorvastatin  80 mg Oral Daily    cefTRIAXone (ROCEPHIN) IVPB  2 g Intravenous Q24H    clopidogreL  75 mg Oral Daily    gabapentin  200 mg Oral BID    hydrALAZINE  25 mg Oral TID    hydroCHLOROthiazide  25 mg Oral Daily    insulin aspart U-100  15 Units Subcutaneous TID WM    insulin detemir  U-100  35 Units Subcutaneous QHS    isosorbide dinitrate  10 mg Oral TID    losartan  100 mg Oral Daily    metroNIDAZOLE  500 mg Oral Q8H    mupirocin   Nasal BID    NIFEdipine  60 mg Oral Daily    pantoprazole  40 mg Oral Daily    sodium chloride 0.9%  10 mL Intravenous Q6H    tamsulosin  0.4 mg Oral Daily    vancomycin (VANCOCIN) IVPB  1,250 mg Intravenous Q24H         Infusions:       PRN:  sodium chloride 0.9%, dextrose 10%, dextrose 10%, dextrose, dextrose, glucagon (human recombinant), insulin aspart U-100, naloxone, sodium chloride 0.9%, Flushing PICC Protocol **AND** sodium chloride 0.9% **AND** sodium chloride 0.9%, Pharmacy to dose Vancomycin consult **AND** vancomycin - pharmacy to dose    Antibiotics and Day Number of Therapy:  Antibiotics (From admission, onward)      Start     Stop Route Frequency Ordered    03/18/23 1300  vancomycin 1,250 mg in dextrose 5 % (D5W) 250 mL IVPB (Vial-Mate)         -- IV Every 24 hours (non-standard times) 03/18/23 0251    03/14/23 2230  cefTRIAXone (ROCEPHIN) 2 g in dextrose 5 % in water (D5W) 5 % 50 mL IVPB (MB+)         -- IV Every 24 hours (non-standard times) 03/14/23 1428    03/13/23 0900  mupirocin 2 % ointment         03/18 0859 Nasl 2 times daily 03/13/23 0543    03/10/23 2200  metroNIDAZOLE tablet 500 mg         -- Oral Every 8 hours 03/10/23 1627    03/10/23 1725  vancomycin - pharmacy to dose  (vancomycin IVPB)        See Ham for full Linked Orders Report.    -- IV pharmacy to manage frequency 03/10/23 1627          Assessment:     Saji Castañeda is a 73 y.o. male with a PMHx of chronic LLE osteomyelitis,T2DM c/b neuropathy, PVD, DVT in RU (June 2022), HTN, CKD, Mobitz Type 1, NSTEMI (9/2022) presenting with worsening BLE wounds that has been getting worse over the past 3 wks. Pt will be admitted to [facility] for the evaluation and management of chronic BLE wounds w/ superimposed cellulitis    Plan:     Chronic BLE wounds w/ superimposed cellulitis  Per  Wound Care, pt has new R anterior ankle wounds w/ tendon exposure & a known L plantar foot wound w/ palpable bone in L heel from previous visit. Pt sent to ED for possible IV abx +/- debridement.  Afebrile w/ no elevations in WBC and ANC, however cannot r/o systemic infxn given pt's poor immune status 2/2 long T2DM hx  Consulted ID for abx management/regiment given hx of receiving dapto. per ID:  Pt needed PICC line (placed on 3/13 in [facility])  Vanc & Ceftriaxone IV along w/ Flagyl PO for ~6wks (last Vanc Trough 15.6 on 3/15)  End of IV abx date: 4/20/23  Weekly outpt labs on Mon or Tues while on IV abx:  CBC, CMP or BMP, ESR, CRP, CPK, and Vanc trough (goal: 15-20)  Appreciate recs  MRI R Ankle showed no acute osteomyelitis but showed large soft tissue ulcer of the dorsal aspect of the hindfoot and ankle with exposure of the extensor hallucis tendon w/ surrounding soft tissue edema and cellulitis.   BLE U/S showed no evidence of vasc occlusion/stenosis in RLE/LLE arterial system, but does display findings suggestive of vasc ds  MRI L Foot showed changes of osteomyelitis involving the posterior calcaneus where there is a large soft tissue defect.  It is similar to 02/19/2021 examination.  Consulted Podiatry for L heel osteo eval; per Podiatry:  Subacute osteomyelitis of left foot  Ulcer improving today to right anterior ankle, continues to probe to bone, however signs of infection appear to be resolving and increased granular tissue noted. Recommend wound vac application. Orders placed.   Cultures previously taken, recommend 6 weeks of IV antibiotics for osteomyelitis   Acute osteomyelitis of right ankle  MRI reviewed with osteomyelitis to left heel. Heel wound without acute signs of infection, site with bone exposure. Discussed surgical vs conservative treatment options with amputation vs conservative treatment with 6 weeks of IV antibiotics with no guarantee of resolution. Patient elects to continue with antibiotics  "for now. Bone debridement previously performed with cultures taken. Antibiotic management per Infectious Disease. He will require 6 weeks of IV antibiotics.   Continue z-flex boots to both lower extremities, he is NWB to bilateral lower extremity.   Nursing orders in for wound care to left heel, upon d/c home health vs nursing to continue same dressing changes q3x per week. Wound vac change q3x per week, orders to follow. He is to continue follow up at wound clinic with Dr. Farrell  Appreciate recs  Consulted Surgery for BKA eval; per Surgery:  Dr Almanzar has been discussing w/ pt about BKA however pt has been refusing so far.  Continue IV Abx and local wound care  Appreciate recs  3/10 Aerobic B-Cx grew Proteus Mirabilis. 3/12 BC x2 NGTD  3/10 Bone Cx showed no anaerobic growth  3/17 Wound vac placed on RLE for bleed; stable    T2DM w/ Neuropathy  Last A1c 9.9 about 5 months ago  Home Metformin and Rosuvastatin w/ insulin  POCT glucose checks  A1c 8.4  Currently on Atorvastatin and SSI  Continue home Gabapentin for neuropathy  Continue home Lasix and Tamsulosin for hx inability to empty bladder 2/2 neuropathy     PAD w/ hx of DVT in RUE  DVT in RUE in June 2022  DAPT  Continue Eliquis     HTN  Continue home Hydralazine, Isodil, Losartan, and Nifedipine.     Hx of Type 1 Mobitz  Avoid BB  Unremarkable ROS and PE suggestive of cardiac process at this time  CTM     Hx of NSTEMI  Hx of NSTEMI in 9/2022  TTE during NSTEMI work-up showed LVEF ~60% w/ notable Pulm HTN (PA systolic pressure 41mmHg)  TTE 3/11 showed LVEF ~70% w/ no other acute/significant changes from previous  Unremarkable ROS and PE suggestive of cardiac process at this time  CTM     Ppx: Apixaban  Diet: Diabetic  Code: Full     Disposition: pending definitive post-discharge plans    Vi Bains MD (Nak)  [facility] Internal Medicine, PGY-1    [facility] Medicine Hospitalist Pager numbers:   [facility] Hospitalist Medicine Team A (Lorne/Riki): [phone]  [facility] " Hospitalist Medicine Team B (Arik/Lucio):  [phone]

## 2023-03-19 LAB
ALBUMIN SERPL BCP-MCNC: 2.1 G/DL (ref 3.5–5.2)
ALP SERPL-CCNC: 49 U/L (ref 55–135)
ALT SERPL W/O P-5'-P-CCNC: 19 U/L (ref 10–44)
ANION GAP SERPL CALC-SCNC: 10 MMOL/L (ref 8–16)
AST SERPL-CCNC: 21 U/L (ref 10–40)
BASOPHILS # BLD AUTO: 0.05 K/UL (ref 0–0.2)
BASOPHILS NFR BLD: 0.6 % (ref 0–1.9)
BILIRUB SERPL-MCNC: 0.1 MG/DL (ref 0.1–1)
BUN SERPL-MCNC: 39 MG/DL (ref 8–23)
CALCIUM SERPL-MCNC: 8.4 MG/DL (ref 8.7–10.5)
CHLORIDE SERPL-SCNC: 102 MMOL/L (ref 95–110)
CO2 SERPL-SCNC: 25 MMOL/L (ref 23–29)
CREAT SERPL-MCNC: 1.8 MG/DL (ref 0.5–1.4)
DIFFERENTIAL METHOD: ABNORMAL
EOSINOPHIL # BLD AUTO: 0.5 K/UL (ref 0–0.5)
EOSINOPHIL NFR BLD: 5.6 % (ref 0–8)
ERYTHROCYTE [DISTWIDTH] IN BLOOD BY AUTOMATED COUNT: 15.9 % (ref 11.5–14.5)
EST. GFR  (NO RACE VARIABLE): 39 ML/MIN/1.73 M^2
GLUCOSE SERPL-MCNC: 200 MG/DL (ref 70–110)
HCT VFR BLD AUTO: 26.3 % (ref 40–54)
HGB BLD-MCNC: 8.3 G/DL (ref 14–18)
IMM GRANULOCYTES # BLD AUTO: 0.03 K/UL (ref 0–0.04)
IMM GRANULOCYTES NFR BLD AUTO: 0.4 % (ref 0–0.5)
LYMPHOCYTES # BLD AUTO: 1.4 K/UL (ref 1–4.8)
LYMPHOCYTES NFR BLD: 17 % (ref 18–48)
MCH RBC QN AUTO: 26.5 PG (ref 27–31)
MCHC RBC AUTO-ENTMCNC: 31.6 G/DL (ref 32–36)
MCV RBC AUTO: 84 FL (ref 82–98)
MONOCYTES # BLD AUTO: 0.6 K/UL (ref 0.3–1)
MONOCYTES NFR BLD: 7.2 % (ref 4–15)
NEUTROPHILS # BLD AUTO: 5.6 K/UL (ref 1.8–7.7)
NEUTROPHILS NFR BLD: 69.2 % (ref 38–73)
NRBC BLD-RTO: 0 /100 WBC
PLATELET # BLD AUTO: 489 K/UL (ref 150–450)
PMV BLD AUTO: 8.5 FL (ref 9.2–12.9)
POCT GLUCOSE: 159 MG/DL (ref 70–110)
POCT GLUCOSE: 186 MG/DL (ref 70–110)
POCT GLUCOSE: 186 MG/DL (ref 70–110)
POCT GLUCOSE: 188 MG/DL (ref 70–110)
POTASSIUM SERPL-SCNC: 4.2 MMOL/L (ref 3.5–5.1)
PROT SERPL-MCNC: 7.3 G/DL (ref 6–8.4)
RBC # BLD AUTO: 3.13 M/UL (ref 4.6–6.2)
SODIUM SERPL-SCNC: 137 MMOL/L (ref 136–145)
WBC # BLD AUTO: 8.04 K/UL (ref 3.9–12.7)

## 2023-03-19 PROCEDURE — 63600175 PHARM REV CODE 636 W HCPCS: Mod: TB,JG | Performed by: INTERNAL MEDICINE

## 2023-03-19 PROCEDURE — 25000003 PHARM REV CODE 250: Performed by: STUDENT IN AN ORGANIZED HEALTH CARE EDUCATION/TRAINING PROGRAM

## 2023-03-19 PROCEDURE — 85025 COMPLETE CBC W/AUTO DIFF WBC: CPT | Performed by: STUDENT IN AN ORGANIZED HEALTH CARE EDUCATION/TRAINING PROGRAM

## 2023-03-19 PROCEDURE — 63600175 PHARM REV CODE 636 W HCPCS: Performed by: STUDENT IN AN ORGANIZED HEALTH CARE EDUCATION/TRAINING PROGRAM

## 2023-03-19 PROCEDURE — 93010 EKG 12-LEAD: ICD-10-PCS | Mod: ,,, | Performed by: INTERNAL MEDICINE

## 2023-03-19 PROCEDURE — 25000003 PHARM REV CODE 250: Performed by: INTERNAL MEDICINE

## 2023-03-19 PROCEDURE — 93005 ELECTROCARDIOGRAM TRACING: CPT

## 2023-03-19 PROCEDURE — 93010 ELECTROCARDIOGRAM REPORT: CPT | Mod: ,,, | Performed by: INTERNAL MEDICINE

## 2023-03-19 PROCEDURE — 80053 COMPREHEN METABOLIC PANEL: CPT | Performed by: STUDENT IN AN ORGANIZED HEALTH CARE EDUCATION/TRAINING PROGRAM

## 2023-03-19 PROCEDURE — 25000003 PHARM REV CODE 250

## 2023-03-19 PROCEDURE — 11000001 HC ACUTE MED/SURG PRIVATE ROOM

## 2023-03-19 PROCEDURE — A4216 STERILE WATER/SALINE, 10 ML: HCPCS | Performed by: STUDENT IN AN ORGANIZED HEALTH CARE EDUCATION/TRAINING PROGRAM

## 2023-03-19 RX ADMIN — OXYCODONE HYDROCHLORIDE AND ACETAMINOPHEN 500 MG: 500 TABLET ORAL at 08:03

## 2023-03-19 RX ADMIN — APIXABAN 5 MG: 5 TABLET, FILM COATED ORAL at 10:03

## 2023-03-19 RX ADMIN — ISOSORBIDE DINITRATE 10 MG: 10 TABLET ORAL at 02:03

## 2023-03-19 RX ADMIN — INSULIN ASPART 15 UNITS: 100 INJECTION, SOLUTION INTRAVENOUS; SUBCUTANEOUS at 06:03

## 2023-03-19 RX ADMIN — SODIUM CHLORIDE, PRESERVATIVE FREE 10 ML: 5 INJECTION INTRAVENOUS at 06:03

## 2023-03-19 RX ADMIN — SODIUM CHLORIDE, PRESERVATIVE FREE 10 ML: 5 INJECTION INTRAVENOUS at 01:03

## 2023-03-19 RX ADMIN — ISOSORBIDE DINITRATE 10 MG: 10 TABLET ORAL at 10:03

## 2023-03-19 RX ADMIN — HYDROCHLOROTHIAZIDE 25 MG: 25 TABLET ORAL at 08:03

## 2023-03-19 RX ADMIN — METRONIDAZOLE 500 MG: 500 TABLET ORAL at 05:03

## 2023-03-19 RX ADMIN — INSULIN ASPART 15 UNITS: 100 INJECTION, SOLUTION INTRAVENOUS; SUBCUTANEOUS at 12:03

## 2023-03-19 RX ADMIN — INSULIN ASPART 15 UNITS: 100 INJECTION, SOLUTION INTRAVENOUS; SUBCUTANEOUS at 08:03

## 2023-03-19 RX ADMIN — ISOSORBIDE DINITRATE 10 MG: 10 TABLET ORAL at 08:03

## 2023-03-19 RX ADMIN — CLOPIDOGREL BISULFATE 75 MG: 75 TABLET ORAL at 08:03

## 2023-03-19 RX ADMIN — HYDRALAZINE HYDROCHLORIDE 25 MG: 25 TABLET, FILM COATED ORAL at 10:03

## 2023-03-19 RX ADMIN — METRONIDAZOLE 500 MG: 500 TABLET ORAL at 01:03

## 2023-03-19 RX ADMIN — METRONIDAZOLE 500 MG: 500 TABLET ORAL at 10:03

## 2023-03-19 RX ADMIN — SODIUM CHLORIDE, PRESERVATIVE FREE 10 ML: 5 INJECTION INTRAVENOUS at 11:03

## 2023-03-19 RX ADMIN — ASPIRIN 81 MG: 81 TABLET, COATED ORAL at 08:03

## 2023-03-19 RX ADMIN — HYDRALAZINE HYDROCHLORIDE 25 MG: 25 TABLET, FILM COATED ORAL at 02:03

## 2023-03-19 RX ADMIN — HYDRALAZINE HYDROCHLORIDE 25 MG: 25 TABLET, FILM COATED ORAL at 09:03

## 2023-03-19 RX ADMIN — INSULIN ASPART 2 UNITS: 100 INJECTION, SOLUTION INTRAVENOUS; SUBCUTANEOUS at 08:03

## 2023-03-19 RX ADMIN — ATORVASTATIN CALCIUM 80 MG: 40 TABLET, FILM COATED ORAL at 08:03

## 2023-03-19 RX ADMIN — GABAPENTIN 200 MG: 100 CAPSULE ORAL at 10:03

## 2023-03-19 RX ADMIN — INSULIN ASPART 2 UNITS: 100 INJECTION, SOLUTION INTRAVENOUS; SUBCUTANEOUS at 12:03

## 2023-03-19 RX ADMIN — SODIUM CHLORIDE, PRESERVATIVE FREE 10 ML: 5 INJECTION INTRAVENOUS at 12:03

## 2023-03-19 RX ADMIN — NIFEDIPINE 60 MG: 30 TABLET, FILM COATED, EXTENDED RELEASE ORAL at 08:03

## 2023-03-19 RX ADMIN — PANTOPRAZOLE SODIUM 40 MG: 40 TABLET, DELAYED RELEASE ORAL at 08:03

## 2023-03-19 RX ADMIN — GABAPENTIN 200 MG: 100 CAPSULE ORAL at 08:03

## 2023-03-19 RX ADMIN — INSULIN ASPART 2 UNITS: 100 INJECTION, SOLUTION INTRAVENOUS; SUBCUTANEOUS at 06:03

## 2023-03-19 RX ADMIN — TAMSULOSIN HYDROCHLORIDE 0.4 MG: 0.4 CAPSULE ORAL at 08:03

## 2023-03-19 RX ADMIN — APIXABAN 5 MG: 5 TABLET, FILM COATED ORAL at 08:03

## 2023-03-19 RX ADMIN — LOSARTAN POTASSIUM 100 MG: 50 TABLET, FILM COATED ORAL at 08:03

## 2023-03-19 RX ADMIN — CEFTRIAXONE SODIUM 2 G: 2 INJECTION, POWDER, FOR SOLUTION INTRAMUSCULAR; INTRAVENOUS at 10:03

## 2023-03-19 RX ADMIN — VANCOMYCIN HYDROCHLORIDE 1250 MG: 1.25 INJECTION, POWDER, LYOPHILIZED, FOR SOLUTION INTRAVENOUS at 01:03

## 2023-03-19 RX ADMIN — SODIUM CHLORIDE, PRESERVATIVE FREE 10 ML: 5 INJECTION INTRAVENOUS at 05:03

## 2023-03-19 NOTE — PLAN OF CARE
Problem: Adult Inpatient Plan of Care  Goal: Plan of Care Review  Outcome: Ongoing, Progressing  Goal: Patient-Specific Goal (Individualized)  Outcome: Ongoing, Progressing  Goal: Absence of Hospital-Acquired Illness or Injury  Outcome: Ongoing, Progressing  Goal: Optimal Comfort and Wellbeing  Outcome: Ongoing, Progressing  Goal: Readiness for Transition of Care  Outcome: Ongoing, Progressing     Problem: Bariatric Environmental Safety  Goal: Safety Maintained with Care  Outcome: Ongoing, Progressing     Problem: Diabetes Comorbidity  Goal: Blood Glucose Level Within Targeted Range  Outcome: Ongoing, Progressing     Problem: Impaired Wound Healing  Goal: Optimal Wound Healing  Outcome: Ongoing, Progressing     Problem: Hypertension Comorbidity  Goal: Blood Pressure in Desired Range  Outcome: Ongoing, Progressing     Problem: Fall Injury Risk  Goal: Absence of Fall and Fall-Related Injury  Outcome: Ongoing, Progressing     Problem: Skin or Soft Tissue Infection  Goal: Absence of Infection Signs and Symptoms  Outcome: Ongoing, Progressing     Problem: Skin Injury Risk Increased  Goal: Skin Health and Integrity  Outcome: Ongoing, Progressing     Problem: Infection  Goal: Absence of Infection Signs and Symptoms  Outcome: Ongoing, Progressing

## 2023-03-19 NOTE — PROGRESS NOTES
"[facility] Medicine Progress Note    Primary Team: [facility] Hospitalist Team B  Attending Physician: Kumar Valdes MD  Resident: Seymour Yan MD  Intern: Vi Bains MD (Nak)    Hospital Day: 8 days    Chief Complaint: Chronic BLE wounds w/ superimposed cellulitis worsening over 3wks    Subjective:   NAEON. . Pt seen by writer this morning. He reports tolerable LE pain 4/10. Pt denied fevers, chills, N/V, SOB, chest pain, abd pain, BLE pains, or numbness/weakness. Afebrile, on room air, pt had a few episodes of high SBP 160s- 170s and bradycardia HR 50    Review of Systems   All other systems reviewed and are negative.      Objective:   Last 24 Hour Vital Signs:  BP  Min: 143/64  Max: 170/71  Temp  Av.3 °F (36.3 °C)  Min: 96.8 °F (36 °C)  Max: 97.7 °F (36.5 °C)  Pulse  Av.2  Min: 53  Max: 60  Resp  Av  Min: 18  Max: 18  SpO2  Av.8 %  Min: 93 %  Max: 98 %    Intake/Output Summary (Last 24 hours) at 3/19/2023 0738  Last data filed at 3/19/2023 0638  Gross per 24 hour   Intake 100 ml   Output 725 ml   Net -625 ml     Physical Examination:    Physical Exam   Constitutional: He is oriented to person, place, and time. normal appearance. He appears obese.  Non-toxic appearance. No distress. He does not appear ill.   HENT:   Mouth/Throat: Mucous membranes are moist. Oropharynx is clear.   Eyes: Pupils are equal, round, and reactive to light.   Cardiovascular: Normal rate, regular rhythm, normal heart sounds and normal pulses.  Pulmonary:     Effort: Pulmonary effort is normal.      Breath sounds: Normal breath sounds.   Abdominal: Soft. Normal appearance and bowel sounds are normal.   Musculoskeletal:         General: Swelling and deformity present; stable.      Comments: See media tab on 3/9/23   Neurological: He is alert and oriented to person, place, and time.   Skin: Skin is warm and dry. PICC line site in LUE c/d/i  See media tab on 3/9/23   Psychiatric: His behavior is normal.  "     Laboratory:  Recent Labs   Lab 03/14/23  0813 03/15/23  0759 03/17/23  0411 03/19/23  0445   WBC 7.58  --  8.46 8.04   HGB 8.4*  --  8.2* 8.3*   HCT 27.4*  --  26.0* 26.3*     --  482* 489*   MCV 84  --  84 84   RDW 15.7*  --  15.9* 15.9*   * 134* 133* 137   K 4.1 4.4 4.1 4.2    100 98 102   CO2 25 25 26 25   BUN 29* 30* 36* 39*   CREATININE 1.6* 1.6* 1.7* 1.8*   * 128* 254* 200*   PROT 7.5  --  7.4 7.3   ALBUMIN 1.9*  --  2.0* 2.1*   BILITOT 0.2  --  0.1 0.1   AST 16  --  17 21   ALKPHOS 52*  --  52* 49*   ALT 14  --  16 19     Microbiology Results (last 7 days)       Procedure Component Value Units Date/Time    Blood culture [[id number]] Collected: 03/12/23 0647    Order Status: Completed Specimen: Blood from Antecubital, Left Updated: 03/17/23 1412     Blood Culture, Routine No growth after 5 days.    Blood culture [[id number]] Collected: 03/12/23 0648    Order Status: Completed Specimen: Blood Updated: 03/17/23 1412     Blood Culture, Routine No growth after 5 days.    Blood culture #2 **CANNOT BE ORDERED STAT** [[id number]] Collected: 03/10/23 0608    Order Status: Completed Specimen: Blood Updated: 03/15/23 1612     Blood Culture, Routine No growth after 5 days.    Culture, Anaerobe [[id number]] Collected: 03/10/23 1741    Order Status: Completed Specimen: Wound from Leg, Left Updated: 03/15/23 1103     Anaerobic Culture No anaerobes isolated    Aerobic culture [[id number]]  (Abnormal)  (Susceptibility) Collected: 03/10/23 1741    Order Status: Completed Specimen: Wound from Leg, Left Updated: 03/13/23 1219     Aerobic Bacterial Culture PROTEUS MIRABILIS  Few  No other significant isolate      Narrative:      Left heel wound    Blood culture #1 **CANNOT BE ORDERED STAT** [[id number]]  (Abnormal)  (Susceptibility) Collected: 03/09/23 1525    Order Status: Completed Specimen: Blood from Peripheral, Hand, Right Updated: 03/13/23 1013     Blood Culture, Routine Gram stain aer bottle: Gram  positive cocci in clusters resembling Staph      Results called to and read back by:Hamlet Mullen RN 03/10/2023  20:36      STAPHYLOCOCCUS SPECIES  Further identified as Staphylococcus pasteuri            I have personally reviewed the above laboratory studies.     Imaging:  EKG (my interpretation): no indication for EKG this admit at this time    X-Ray Ankle Complete Right    Result Date: 3/9/2023  EXAMINATION: XR ANKLE COMPLETE 3 VIEW RIGHT CLINICAL HISTORY: Unspecified open wound, unspecified lower leg, initial encounter TECHNIQUE: AP, lateral, and oblique images of the right ankle were performed. COMPARISON: Right foot series 02/23/2022 FINDINGS: Chronic appearing deformity of the mid to distal femoral shaft presumably sequela of remote trauma.  No acute displaced fracture, dislocation or destructive osseous process.  There is DJD. Nonspecific soft tissue swelling about the distal right lower leg, ankle and dorsal hindfoot particular along the anterior and lateral aspect likely representing cellulitis.  No large soft tissue defect or subcutaneous emphysema seen.  Scattered atherosclerotic vascular and also nonspecific soft tissue calcifications noted.  No radiodense retained foreign body.  Small enthesophyte at the Achilles insertion site.     Distal right lower leg, ankle and hindfoot diffuse nonspecific soft tissue swelling from edema or cellulitis with superimposed more prominent soft tissue swelling along the anterolateral aspect of the distal lower leg.  Clinical correlation advised. Additional chronic appearing findings as above. Electronically signed by: Juan Hernandez MD Date:    03/09/2023 Time:    16:51    MRI Ankle Without Contrast Right    Result Date: 3/9/2023  EXAMINATION: MRI ANKLE WITHOUT CONTRAST RIGHT CLINICAL HISTORY: Soft tissue infection suspected, ankle, xray done; TECHNIQUE: MRI ankle performed per routine protocol without contrast. COMPARISON: MRI right lower extremity from 09/01/2006.  MRI  the contralateral ankle from 02/19/2021.  Radiographs of the right ankle from 03/09/2023 taken at 1543. FINDINGS: Bone: There is no evidence of acute osteomyelitis.  Marrow signal is normal.  No marrow edema or T1 hypointense signal. Articulations: There is cartilage loss of the joint between the navicular and the cuneiform bones with adjacent endplate edema and cystic change in the distal pole of the navicular.  Remaining articulations are grossly unremarkable.  There is no evidence of a large joint effusion. Tendons: Flexor, extensor, and peroneal tendons are grossly intact and demonstrate normal signal.  There is no evidence of tenosynovitis.  The Achilles tendon is intact. Ligaments: The anterior inferior and posteroinferior tibiofibular ligaments are intact.  The anterior talofibular ligament is intact.  The calcaneal fibular and posterior talofibular ligaments are intact.  The deltoid and spring ligaments are intact.  The Lisfranc ligament is not entirely included in the field of view. Soft tissues: There is a large dorsal soft tissue ulcer with exposure of the extensor hallucis tendon.  There is circumferential soft tissue edema the ankle extending into the dorsum the hindfoot.     1. No acute osteomyelitis. 2. Large soft tissue ulcer of the dorsal aspect of the hindfoot and ankle with exposure of the extensor hallucis tendon.  Surrounding soft tissue edema and cellulitis as above. Electronically signed by: Zane Galeana Date:    03/09/2023 Time:    19:45       Current Medications:     Scheduled:   apixaban  5 mg Oral BID    ascorbic acid (vitamin C)  500 mg Oral Daily    aspirin  81 mg Oral Daily    atorvastatin  80 mg Oral Daily    cefTRIAXone (ROCEPHIN) IVPB  2 g Intravenous Q24H    clopidogreL  75 mg Oral Daily    gabapentin  200 mg Oral BID    hydrALAZINE  25 mg Oral TID    hydroCHLOROthiazide  25 mg Oral Daily    insulin aspart U-100  15 Units Subcutaneous TID WM    insulin detemir U-100  35 Units  Subcutaneous QHS    isosorbide dinitrate  10 mg Oral TID    losartan  100 mg Oral Daily    metroNIDAZOLE  500 mg Oral Q8H    NIFEdipine  60 mg Oral Daily    pantoprazole  40 mg Oral Daily    sodium chloride 0.9%  10 mL Intravenous Q6H    tamsulosin  0.4 mg Oral Daily    vancomycin (VANCOCIN) IVPB  1,250 mg Intravenous Q24H         Infusions:       PRN:  sodium chloride 0.9%, dextrose 10%, dextrose 10%, dextrose, dextrose, glucagon (human recombinant), insulin aspart U-100, naloxone, sodium chloride 0.9%, Flushing PICC Protocol **AND** sodium chloride 0.9% **AND** sodium chloride 0.9%, Pharmacy to dose Vancomycin consult **AND** vancomycin - pharmacy to dose    Antibiotics and Day Number of Therapy:  Antibiotics (From admission, onward)      Start     Stop Route Frequency Ordered    03/18/23 1300  vancomycin 1,250 mg in dextrose 5 % (D5W) 250 mL IVPB (Vial-Mate)         -- IV Every 24 hours (non-standard times) 03/18/23 0251    03/14/23 2230  cefTRIAXone (ROCEPHIN) 2 g in dextrose 5 % in water (D5W) 5 % 50 mL IVPB (MB+)         -- IV Every 24 hours (non-standard times) 03/14/23 1428    03/13/23 0900  mupirocin 2 % ointment         03/18 0859 Nasl 2 times daily 03/13/23 0543    03/10/23 2200  metroNIDAZOLE tablet 500 mg         -- Oral Every 8 hours 03/10/23 1627    03/10/23 1725  vancomycin - pharmacy to dose  (vancomycin IVPB)        See Hyperspace for full Linked Orders Report.    -- IV pharmacy to manage frequency 03/10/23 1627          Assessment:     Saji Castañeda is a 73 y.o. male with a PMHx of chronic LLE osteomyelitis,T2DM c/b neuropathy, PVD, DVT in RUE (June 2022), HTN, CKD, Mobitz Type 1, NSTEMI (9/2022) presenting with worsening BLE wounds that has been getting worse over the past 3 wks. Pt will be admitted to [facility] for the evaluation and management of chronic BLE wounds w/ superimposed cellulitis    Plan:     Chronic BLE wounds w/ superimposed cellulitis  Per Wound Care, pt has new R anterior ankle  wounds w/ tendon exposure & a known L plantar foot wound w/ palpable bone in L heel from previous visit. Pt sent to ED for possible IV abx +/- debridement.  Afebrile w/ no elevations in WBC and ANC, however cannot r/o systemic infxn given pt's poor immune status 2/2 long T2DM hx  Consulted ID for abx management/regiment given hx of receiving dapto. per ID:  [place] [facility] [place]  Vanc & Ceftriaxone IV along w/ Flagyl PO for ~6wks (last Vanc Trough 15.6 on 3/15)  End of IV abx date: 4/20/23  Weekly outpt labs on Mon or Tues while on IV abx:  CBC, CMP or BMP, ESR, CRP, CPK, and Vanc trough (goal: 15-20)  Appreciate recs  MRI R Ankle showed no acute osteomyelitis but showed large soft tissue ulcer of the dorsal aspect of the hindfoot and ankle with exposure of the extensor hallucis tendon w/ surrounding soft tissue edema and cellulitis.   BLE U/S showed no evidence of vasc occlusion/stenosis in RLE/LLE arterial system, but does display findings suggestive of vasc ds  MRI L Foot showed changes of osteomyelitis involving the posterior calcaneus where there is a large soft tissue defect.  It is similar to 02/19/2021 examination.  Consulted Podiatry for L heel osteo eval; per Podiatry:  Subacute osteomyelitis of left foot  Ulcer improving today to right anterior ankle, continues to probe to bone, however signs of infection appear to be resolving and increased granular tissue noted. Recommend wound vac application. Orders placed.   Cultures previously taken, recommend 6 weeks of IV antibiotics for osteomyelitis   Acute osteomyelitis of right ankle  MRI reviewed with osteomyelitis to left heel. Heel wound without acute signs of infection, site with bone exposure. Discussed surgical vs conservative treatment options with amputation vs conservative treatment with 6 weeks of IV antibiotics with no guarantee of resolution. Patient elects to continue with antibiotics for now. Bone debridement previously  performed with cultures taken. Antibiotic management per Infectious Disease. He will require 6 weeks of IV antibiotics.   Continue z-flex boots to both lower extremities, he is NWB to bilateral lower extremity.   Nursing orders in for wound care to left heel, upon d/c home health vs nursing to continue same dressing changes q3x per week. Wound vac change q3x per week, orders to follow. He is to continue follow up at wound clinic with Dr. Farrell  Appreciate recs  Consulted Surgery for BKA eval; per Surgery:  Dr Almanzar has been discussing w/ pt about BKA however pt has been refusing so far.  Continue IV Abx and local wound care  Appreciate recs  3/10 Aerobic B-Cx grew Proteus Mirabilis. 3/12 BC x2 NGTD  3/10 Bone Cx showed no anaerobic growth    T2DM w/ Neuropathy  Last A1c 9.9 about 5 months ago  Home Metformin and Rosuvastatin w/ insulin  POCT glucose checks  A1c 8.4  Currently on Atorvastatin and SSI  Continue home Gabapentin for neuropathy  Continue home Lasix and Tamsulosin for hx inability to empty bladder 2/2 neuropathy     PAD w/ hx of DVT in RUE  DVT in RUE in June 2022  DAPT  Continue Eliquis     HTN  Bradycardia  Continue home Hydralazine, Isodil, Losartan, and Nifedipine.  Order EKG, TSH: pending     Hx of Type 1 Mobitz  Avoid BB  Unremarkable ROS and PE suggestive of cardiac process at this time  CTM     Hx of NSTEMI  Hx of NSTEMI in 9/2022  TTE during NSTEMI work-up showed LVEF ~60% w/ notable Pulm HTN (PA systolic pressure 41mmHg)  TTE 3/11 showed LVEF ~70% w/ no other acute/significant changes from previous  Unremarkable ROS and PE suggestive of cardiac process at this time  CTM     Ppx: Apixaban  Diet: Diabetic  Code: Full     Disposition: medically stable at this time; pending discharge disposition.    Jayesh Hoang MD  [facility] Internal Medicine, PGY-1    [facility] Hospitalist Pager numbers:   [facility] Hospitalist Medicine Team A (Lorne/Riki): [phone]  [facility] Hospitalist Medicine Team B  (Arik/Lucio):  [phone]

## 2023-03-19 NOTE — PLAN OF CARE
Problem: Adult Inpatient Plan of Care  Goal: Plan of Care Review  Outcome: Ongoing, Progressing     Problem: Adult Inpatient Plan of Care  Goal: Absence of Hospital-Acquired Illness or Injury  Outcome: Ongoing, Progressing     Problem: Diabetes Comorbidity  Goal: Blood Glucose Level Within Targeted Range  Outcome: Ongoing, Progressing     Problem: Impaired Wound Healing  Goal: Optimal Wound Healing  Outcome: Ongoing, Progressing     Problem: Fall Injury Risk  Goal: Absence of Fall and Fall-Related Injury  Outcome: Ongoing, Progressing     Problem: Skin or Soft Tissue Infection  Goal: Absence of Infection Signs and Symptoms  Outcome: Ongoing, Progressing     Problem: Infection  Goal: Absence of Infection Signs and Symptoms  Outcome: Ongoing, Progressing

## 2023-03-20 ENCOUNTER — PATIENT OUTREACH (OUTPATIENT)
Dept: ADMINISTRATIVE | Facility: OTHER | Age: 73
End: 2023-03-20
Payer: MEDICARE

## 2023-03-20 LAB
POCT GLUCOSE: 166 MG/DL (ref 70–110)
POCT GLUCOSE: 190 MG/DL (ref 70–110)
POCT GLUCOSE: 201 MG/DL (ref 70–110)
POCT GLUCOSE: 225 MG/DL (ref 70–110)
T4 FREE SERPL-MCNC: 0.92 NG/DL (ref 0.71–1.51)
TSH SERPL DL<=0.005 MIU/L-ACNC: 4.1 UIU/ML (ref 0.4–4)
VANCOMYCIN TROUGH SERPL-MCNC: 16.1 UG/ML (ref 10–22)

## 2023-03-20 PROCEDURE — 63600175 PHARM REV CODE 636 W HCPCS: Performed by: STUDENT IN AN ORGANIZED HEALTH CARE EDUCATION/TRAINING PROGRAM

## 2023-03-20 PROCEDURE — 63600175 PHARM REV CODE 636 W HCPCS: Mod: TB,JG | Performed by: INTERNAL MEDICINE

## 2023-03-20 PROCEDURE — 84443 ASSAY THYROID STIM HORMONE: CPT

## 2023-03-20 PROCEDURE — 25000003 PHARM REV CODE 250: Performed by: STUDENT IN AN ORGANIZED HEALTH CARE EDUCATION/TRAINING PROGRAM

## 2023-03-20 PROCEDURE — 97530 THERAPEUTIC ACTIVITIES: CPT | Mod: CO

## 2023-03-20 PROCEDURE — 25000003 PHARM REV CODE 250

## 2023-03-20 PROCEDURE — 84439 ASSAY OF FREE THYROXINE: CPT

## 2023-03-20 PROCEDURE — 25000003 PHARM REV CODE 250: Performed by: INTERNAL MEDICINE

## 2023-03-20 PROCEDURE — 80202 ASSAY OF VANCOMYCIN: CPT | Performed by: INTERNAL MEDICINE

## 2023-03-20 PROCEDURE — 97110 THERAPEUTIC EXERCISES: CPT | Mod: CO

## 2023-03-20 PROCEDURE — 11000001 HC ACUTE MED/SURG PRIVATE ROOM

## 2023-03-20 PROCEDURE — A4216 STERILE WATER/SALINE, 10 ML: HCPCS | Performed by: STUDENT IN AN ORGANIZED HEALTH CARE EDUCATION/TRAINING PROGRAM

## 2023-03-20 RX ADMIN — HYDRALAZINE HYDROCHLORIDE 25 MG: 25 TABLET, FILM COATED ORAL at 02:03

## 2023-03-20 RX ADMIN — VANCOMYCIN HYDROCHLORIDE 1250 MG: 1.25 INJECTION, POWDER, LYOPHILIZED, FOR SOLUTION INTRAVENOUS at 01:03

## 2023-03-20 RX ADMIN — OXYCODONE HYDROCHLORIDE AND ACETAMINOPHEN 500 MG: 500 TABLET ORAL at 09:03

## 2023-03-20 RX ADMIN — METRONIDAZOLE 500 MG: 500 TABLET ORAL at 01:03

## 2023-03-20 RX ADMIN — LOSARTAN POTASSIUM 100 MG: 50 TABLET, FILM COATED ORAL at 09:03

## 2023-03-20 RX ADMIN — GABAPENTIN 200 MG: 100 CAPSULE ORAL at 11:03

## 2023-03-20 RX ADMIN — APIXABAN 5 MG: 5 TABLET, FILM COATED ORAL at 09:03

## 2023-03-20 RX ADMIN — INSULIN ASPART 15 UNITS: 100 INJECTION, SOLUTION INTRAVENOUS; SUBCUTANEOUS at 11:03

## 2023-03-20 RX ADMIN — PANTOPRAZOLE SODIUM 40 MG: 40 TABLET, DELAYED RELEASE ORAL at 09:03

## 2023-03-20 RX ADMIN — INSULIN ASPART 2 UNITS: 100 INJECTION, SOLUTION INTRAVENOUS; SUBCUTANEOUS at 09:03

## 2023-03-20 RX ADMIN — ATORVASTATIN CALCIUM 80 MG: 40 TABLET, FILM COATED ORAL at 09:03

## 2023-03-20 RX ADMIN — METRONIDAZOLE 500 MG: 500 TABLET ORAL at 06:03

## 2023-03-20 RX ADMIN — HYDRALAZINE HYDROCHLORIDE 25 MG: 25 TABLET, FILM COATED ORAL at 09:03

## 2023-03-20 RX ADMIN — ISOSORBIDE DINITRATE 10 MG: 10 TABLET ORAL at 11:03

## 2023-03-20 RX ADMIN — NIFEDIPINE 60 MG: 30 TABLET, FILM COATED, EXTENDED RELEASE ORAL at 09:03

## 2023-03-20 RX ADMIN — HYDRALAZINE HYDROCHLORIDE 25 MG: 25 TABLET, FILM COATED ORAL at 11:03

## 2023-03-20 RX ADMIN — SODIUM CHLORIDE, PRESERVATIVE FREE 10 ML: 5 INJECTION INTRAVENOUS at 11:03

## 2023-03-20 RX ADMIN — INSULIN ASPART 15 UNITS: 100 INJECTION, SOLUTION INTRAVENOUS; SUBCUTANEOUS at 05:03

## 2023-03-20 RX ADMIN — TAMSULOSIN HYDROCHLORIDE 0.4 MG: 0.4 CAPSULE ORAL at 09:03

## 2023-03-20 RX ADMIN — HYDROCHLOROTHIAZIDE 25 MG: 25 TABLET ORAL at 09:03

## 2023-03-20 RX ADMIN — INSULIN ASPART 2 UNITS: 100 INJECTION, SOLUTION INTRAVENOUS; SUBCUTANEOUS at 11:03

## 2023-03-20 RX ADMIN — ISOSORBIDE DINITRATE 10 MG: 10 TABLET ORAL at 02:03

## 2023-03-20 RX ADMIN — INSULIN ASPART 15 UNITS: 100 INJECTION, SOLUTION INTRAVENOUS; SUBCUTANEOUS at 09:03

## 2023-03-20 RX ADMIN — METRONIDAZOLE 500 MG: 500 TABLET ORAL at 11:03

## 2023-03-20 RX ADMIN — ISOSORBIDE DINITRATE 10 MG: 10 TABLET ORAL at 09:03

## 2023-03-20 RX ADMIN — CLOPIDOGREL BISULFATE 75 MG: 75 TABLET ORAL at 09:03

## 2023-03-20 RX ADMIN — GABAPENTIN 200 MG: 100 CAPSULE ORAL at 09:03

## 2023-03-20 RX ADMIN — ASPIRIN 81 MG: 81 TABLET, COATED ORAL at 09:03

## 2023-03-20 RX ADMIN — INSULIN ASPART 4 UNITS: 100 INJECTION, SOLUTION INTRAVENOUS; SUBCUTANEOUS at 05:03

## 2023-03-20 RX ADMIN — SODIUM CHLORIDE, PRESERVATIVE FREE 10 ML: 5 INJECTION INTRAVENOUS at 07:03

## 2023-03-20 RX ADMIN — SODIUM CHLORIDE, PRESERVATIVE FREE 10 ML: 5 INJECTION INTRAVENOUS at 06:03

## 2023-03-20 RX ADMIN — APIXABAN 5 MG: 5 TABLET, FILM COATED ORAL at 11:03

## 2023-03-20 RX ADMIN — CEFTRIAXONE SODIUM 2 G: 2 INJECTION, POWDER, FOR SOLUTION INTRAMUSCULAR; INTRAVENOUS at 11:03

## 2023-03-20 NOTE — PROGRESS NOTES
LEW called pt's sister to advise here that pt 1.  has been denied LTAC plcmt per pt's insurance and 2.  Pt has been accepted by only one SNF due to pt's needs/staff and bed availability - [facility] -  phone # repeatedly busy - LEW will reach out to her later this am.     Kandy Castañeda  Sister  [phone]    ------------------------------  pt's sister called LEW back -- Explained that pt has been denied LTAC per insurance  and no other skilled faciliities are able to take pt  -- Ms. Gaitan stated she understands and now agrees with placement at [facility].  LEW called [place] to notify her - [place] will reach out to pt's sister to complete paperwork.

## 2023-03-20 NOTE — PLAN OF CARE
Problem: Occupational Therapy  Goal: Occupational Therapy Goal  Description: Goals to be met by: 4/16/2023     Patient will increase functional independence with ADLs by performing:    UE Dressing with Set-up Assistance.  Grooming while seated with Set-up Assistance.  Toileting from drop-arm bedside commode with Supervision for hygiene and clothing management.   Rolling to Bilateral with Modified [place].   Supine to sit with Modified [place].  Toilet transfer to bedside commode with Minimal Assistance & use of slide board.    Outcome: Ongoing, Progressing   Saji Castañeda is a 73 y.o. male with a medical diagnosis of Acute osteomyelitis of right ankle.   Performance deficits affecting function are weakness, impaired endurance, impaired self care skills, impaired functional mobility, gait instability, impaired balance, decreased upper extremity function, decreased lower extremity function, decreased ROM, impaired coordination, impaired skin, edema, orthopedic precautions. Pt found in bed, agreeable to therapy.  Pt requires Supervision with bed mobility and tolerated EOB sitting ~20 min while performing BUE/BLE therex.  Pt tolerated well. Continue OT services to address functional goals, progressing as able.

## 2023-03-20 NOTE — PROGRESS NOTES
"[facility] Medicine Progress Note    Primary Team: [facility] Hospitalist Team B  Attending Physician: Kumar Valdes MD  Resident: Seymour Yan MD  Intern: Vi Bains MD (Nak)    Hospital Day: 9 days    Chief Complaint: Chronic BLE wounds w/ superimposed cellulitis worsening over 3wks    Subjective:   NAEON. Afebrile w/ stable vitals on room air. Pt seen by writer this morning. Pt stated having intermittent RLE pain in toes that range from 0 to 4 out of 10 pain scale since having wound vac but improved since yesterday as they occur less frequently. Denied having RLE pain during interview. Denied having issues w/ PICC line sight or LLE wound. Denied fevers, chills, N/V, SOB, chest pain, abd pain, BLE pains, or numbness/weakness.      Review of Systems   All other systems reviewed and are negative.      Objective:   Last 24 Hour Vital Signs:  BP  Min: 139/65  Max: 190/79  Temp  Av.4 °F (36.3 °C)  Min: 96.2 °F (35.7 °C)  Max: 98.2 °F (36.8 °C)  Pulse  Av.9  Min: 47  Max: 65  Resp  Av  Min: 18  Max: 18  SpO2  Av.6 %  Min: 94 %  Max: 98 %  Weight  Av.1 kg (275 lb 12.7 oz)  Min: 125.1 kg (275 lb 12.7 oz)  Max: 125.1 kg (275 lb 12.7 oz)    Intake/Output Summary (Last 24 hours) at 3/20/2023 0819  Last data filed at 3/20/2023 0641  Gross per 24 hour   Intake --   Output 600 ml   Net -600 ml     Physical Examination:    Physical Exam   Constitutional: He is oriented to person, place, and time. normal appearance. He appears obese.  Non-toxic appearance. No distress. He does not appear ill.   HENT:   Mouth/Throat: Mucous membranes are moist. Oropharynx is clear.   Eyes: Pupils are equal, round, and reactive to light.   Cardiovascular: Normal rate, regular rhythm, normal heart sounds and normal pulses.  Pulmonary:     Effort: Pulmonary effort is normal.      Breath sounds: Normal breath sounds.   Abdominal: Soft. Normal appearance and bowel sounds are normal.   Musculoskeletal:         " General: Swelling and deformity present on BLE; stable. Wound vac on RLE.      Comments: See media tab on 3/9/23   Neurological: He is alert and oriented to person, place, and time.   Skin: Skin is warm and dry. PICC line site in LUE c/d/i  See media tab on 3/9/23   Psychiatric: linear thought, constricted affect;     Laboratory:  Recent Labs   Lab 03/14/23  0813 03/15/23  0759 03/17/23  0411 03/19/23  0445   WBC 7.58  --  8.46 8.04   HGB 8.4*  --  8.2* 8.3*   HCT 27.4*  --  26.0* 26.3*     --  482* 489*   MCV 84  --  84 84   RDW 15.7*  --  15.9* 15.9*   * 134* 133* 137   K 4.1 4.4 4.1 4.2    100 98 102   CO2 25 25 26 25   BUN 29* 30* 36* 39*   CREATININE 1.6* 1.6* 1.7* 1.8*   * 128* 254* 200*   PROT 7.5  --  7.4 7.3   ALBUMIN 1.9*  --  2.0* 2.1*   BILITOT 0.2  --  0.1 0.1   AST 16  --  17 21   ALKPHOS 52*  --  52* 49*   ALT 14  --  16 19     Microbiology Results (last 7 days)       Procedure Component Value Units Date/Time    Blood culture [[id number]] Collected: 03/12/23 0647    Order Status: Completed Specimen: Blood from Antecubital, Left Updated: 03/17/23 1412     Blood Culture, Routine No growth after 5 days.    Blood culture [[id number]] Collected: 03/12/23 0648    Order Status: Completed Specimen: Blood Updated: 03/17/23 1412     Blood Culture, Routine No growth after 5 days.    Blood culture #2 **CANNOT BE ORDERED STAT** [[id number]] Collected: 03/10/23 0608    Order Status: Completed Specimen: Blood Updated: 03/15/23 1612     Blood Culture, Routine No growth after 5 days.    Culture, Anaerobe [[id number]] Collected: 03/10/23 1741    Order Status: Completed Specimen: Wound from Leg, Left Updated: 03/15/23 1103     Anaerobic Culture No anaerobes isolated    Aerobic culture [[id number]]  (Abnormal)  (Susceptibility) Collected: 03/10/23 5799    Order Status: Completed Specimen: Wound from Leg, Left Updated: 03/13/23 1219     Aerobic Bacterial Culture PROTEUS MIRABILIS  Few  No other  significant isolate      Narrative:      Left heel wound    Blood culture #1 **CANNOT BE ORDERED STAT** [[id number]]  (Abnormal)  (Susceptibility) Collected: 03/09/23 1525    Order Status: Completed Specimen: Blood from Peripheral, Hand, Right Updated: 03/13/23 1013     Blood Culture, Routine Gram stain aer bottle: Gram positive cocci in clusters resembling Staph      Results called to and read back by:Hamlet Mullen RN 03/10/2023  20:36      STAPHYLOCOCCUS SPECIES  Further identified as Staphylococcus pasteuri            I have personally reviewed the above laboratory studies.     Imaging:  EKG (my interpretation): no indication for EKG this admit at this time    X-Ray Ankle Complete Right    Result Date: 3/9/2023  EXAMINATION: XR ANKLE COMPLETE 3 VIEW RIGHT CLINICAL HISTORY: Unspecified open wound, unspecified lower leg, initial encounter TECHNIQUE: AP, lateral, and oblique images of the right ankle were performed. COMPARISON: Right foot series 02/23/2022 FINDINGS: Chronic appearing deformity of the mid to distal femoral shaft presumably sequela of remote trauma.  No acute displaced fracture, dislocation or destructive osseous process.  There is DJD. Nonspecific soft tissue swelling about the distal right lower leg, ankle and dorsal hindfoot particular along the anterior and lateral aspect likely representing cellulitis.  No large soft tissue defect or subcutaneous emphysema seen.  Scattered atherosclerotic vascular and also nonspecific soft tissue calcifications noted.  No radiodense retained foreign body.  Small enthesophyte at the Achilles insertion site.     Distal right lower leg, ankle and hindfoot diffuse nonspecific soft tissue swelling from edema or cellulitis with superimposed more prominent soft tissue swelling along the anterolateral aspect of the distal lower leg.  Clinical correlation advised. Additional chronic appearing findings as above. Electronically signed by: Juan Hernandez MD Date:    03/09/2023  Time:    16:51    MRI Ankle Without Contrast Right    Result Date: 3/9/2023  EXAMINATION: MRI ANKLE WITHOUT CONTRAST RIGHT CLINICAL HISTORY: Soft tissue infection suspected, ankle, xray done; TECHNIQUE: MRI ankle performed per routine protocol without contrast. COMPARISON: MRI right lower extremity from 09/01/2006.  MRI the contralateral ankle from 02/19/2021.  Radiographs of the right ankle from 03/09/2023 taken at 1543. FINDINGS: Bone: There is no evidence of acute osteomyelitis.  Marrow signal is normal.  No marrow edema or T1 hypointense signal. Articulations: There is cartilage loss of the joint between the navicular and the cuneiform bones with adjacent endplate edema and cystic change in the distal pole of the navicular.  Remaining articulations are grossly unremarkable.  There is no evidence of a large joint effusion. Tendons: Flexor, extensor, and peroneal tendons are grossly intact and demonstrate normal signal.  There is no evidence of tenosynovitis.  The Achilles tendon is intact. Ligaments: The anterior inferior and posteroinferior tibiofibular ligaments are intact.  The anterior talofibular ligament is intact.  The calcaneal fibular and posterior talofibular ligaments are intact.  The deltoid and spring ligaments are intact.  The Lisfranc ligament is not entirely included in the field of view. Soft tissues: There is a large dorsal soft tissue ulcer with exposure of the extensor hallucis tendon.  There is circumferential soft tissue edema the ankle extending into the dorsum the hindfoot.     1. No acute osteomyelitis. 2. Large soft tissue ulcer of the dorsal aspect of the hindfoot and ankle with exposure of the extensor hallucis tendon.  Surrounding soft tissue edema and cellulitis as above. Electronically signed by: Zane Galeana Date:    03/09/2023 Time:    19:45       Current Medications:     Scheduled:   apixaban  5 mg Oral BID    ascorbic acid (vitamin C)  500 mg Oral Daily    aspirin  81 mg Oral  Daily    atorvastatin  80 mg Oral Daily    cefTRIAXone (ROCEPHIN) IVPB  2 g Intravenous Q24H    clopidogreL  75 mg Oral Daily    gabapentin  200 mg Oral BID    hydrALAZINE  25 mg Oral TID    hydroCHLOROthiazide  25 mg Oral Daily    insulin aspart U-100  15 Units Subcutaneous TID WM    insulin detemir U-100  40 Units Subcutaneous QHS    isosorbide dinitrate  10 mg Oral TID    losartan  100 mg Oral Daily    metroNIDAZOLE  500 mg Oral Q8H    NIFEdipine  60 mg Oral Daily    pantoprazole  40 mg Oral Daily    sodium chloride 0.9%  10 mL Intravenous Q6H    tamsulosin  0.4 mg Oral Daily    vancomycin (VANCOCIN) IVPB  1,250 mg Intravenous Q24H         Infusions:       PRN:  sodium chloride 0.9%, dextrose 10%, dextrose 10%, dextrose, dextrose, glucagon (human recombinant), insulin aspart U-100, naloxone, sodium chloride 0.9%, Flushing PICC Protocol **AND** sodium chloride 0.9% **AND** sodium chloride 0.9%, Pharmacy to dose Vancomycin consult **AND** vancomycin - pharmacy to dose    Antibiotics and Day Number of Therapy:  Antibiotics (From admission, onward)      Start     Stop Route Frequency Ordered    03/18/23 1300  vancomycin 1,250 mg in dextrose 5 % (D5W) 250 mL IVPB (Vial-Mate)         -- IV Every 24 hours (non-standard times) 03/18/23 0251    03/14/23 2230  cefTRIAXone (ROCEPHIN) 2 g in dextrose 5 % in water (D5W) 5 % 50 mL IVPB (MB+)         -- IV Every 24 hours (non-standard times) 03/14/23 1428    03/13/23 0900  mupirocin 2 % ointment         03/18 0859 Nasl 2 times daily 03/13/23 0543    03/10/23 2200  metroNIDAZOLE tablet 500 mg         -- Oral Every 8 hours 03/10/23 1627    03/10/23 1725  vancomycin - pharmacy to dose  (vancomycin IVPB)        See Ham for full Linked Orders Report.    -- IV pharmacy to manage frequency 03/10/23 1627          Assessment:     Saji Castañeda is a 73 y.o. male with a PMHx of chronic LLE osteomyelitis,T2DM c/b neuropathy, PVD, DVT in RUE (June 2022), HTN, CKD, Mobitz Type 1,  NSTEMI (9/2022) presenting with worsening BLE wounds that has been getting worse over the past 3 wks. Pt will be admitted to [facility] for the evaluation and management of chronic BLE wounds w/ superimposed cellulitis    Plan:     Chronic BLE wounds w/ superimposed cellulitis  Per Wound Care, pt has new R anterior ankle wounds w/ tendon exposure & a known L plantar foot wound w/ palpable bone in L heel from previous visit. Pt sent to ED for possible IV abx +/- debridement.  Afebrile w/ no elevations in WBC and ANC, however cannot r/o systemic infxn given pt's poor immune status 2/2 long T2DM hx  Consulted ID for abx management/regiment given hx of receiving dapto. per ID:  Pt needed PICC line (placed on 3/13 in [facility])  Vanc & Ceftriaxone IV along w/ Flagyl PO for ~6wks (last Vanc Trough 15.6 on 3/15; goal of 15-20)  End of IV abx date: 4/20/23  Weekly outpt labs on Mon or Tues while on IV abx:  CBC, CMP or BMP, ESR, CRP, CPK, and Vanc trough (goal: 15-20)  Appreciate recs  MRI R Ankle showed no acute osteomyelitis but showed large soft tissue ulcer of the dorsal aspect of the hindfoot and ankle with exposure of the extensor hallucis tendon w/ surrounding soft tissue edema and cellulitis.   BLE U/S showed no evidence of vasc occlusion/stenosis in RLE/LLE arterial system, but does display findings suggestive of vasc ds  MRI L Foot showed changes of osteomyelitis involving the posterior calcaneus where there is a large soft tissue defect.  It is similar to 02/19/2021 examination.  Consulted Podiatry for L heel osteo eval; per Podiatry on 3/16:  Subacute osteomyelitis of left foot  MRI reviewed with osteomyelitis to left heel. Heel wound without acute signs of infection, site with bone exposure. Discussed surgical vs conservative treatment options with amputation vs conservative treatment with 6 weeks of IV antibiotics with no guarantee of resolution.   Patient elects to continue with antibiotics for now. Bone debridement  previously performed with cultures taken. Antibiotic management per Infectious Disease. He will require 6 weeks of IV antibiotics.   Continue z-flex boots to both lower extremities, he is NWB to bilateral lower extremity.   Nursing orders in for wound care to left heel, upon d/c home health vs nursing to continue same dressing changes q3x per week. Wound vac change q3x per week, orders to follow. He is to continue follow up at wound clinic with Dr. Farrell  Acute osteomyelitis of right ankle  Ulcer improving today to right anterior ankle, continues to probe to bone, however signs of infection appear to be resolving and increased granular tissue noted. Recommend wound vac application. Orders placed.   Cultures previously taken, recommend 6 weeks of IV antibiotics for osteomyelitis  Appreciate recs  Consulted Surgery for BKA eval; per Surgery:  Dr Almanzar has been discussing w/ pt about BKA however pt has been refusing so far.  Continue IV Abx and local wound care  Defer wound care to Dr Orozco  Appreciate recs  3/10 Aerobic B-Cx grew Proteus Mirabilis. 3/12 BC x2 NGTD  3/10 Bone Cx showed no anaerobic growth  3/17 Wound vac placed on RLE for bleed; stable    T2DM w/ Neuropathy  Last A1c 9.9 about 5 months ago  Home Metformin and Rosuvastatin w/ insulin  POCT glucose checks  A1c 8.4  Currently on Atorvastatin and SSI  Continue home Gabapentin for neuropathy  Continue home Lasix and Tamsulosin for hx inability to empty bladder 2/2 neuropathy     PAD w/ hx of DVT in RUE  DVT in RUE in June 2022  DAPT  Continue Eliquis     HTN  Continue home Hydralazine, Isodil, Losartan, and Nifedipine.  3/19 TSH (pending)  CTM     Hx of Type 1 Mobitz  Avoid BB  Unremarkable ROS and PE suggestive of cardiac process at this time  CTM     Hx of NSTEMI  Hx of NSTEMI in 9/2022  TTE during NSTEMI work-up showed LVEF ~60% w/ notable Pulm HTN (PA systolic pressure 41mmHg)  TTE 3/11 showed LVEF ~70% w/ no other acute/significant changes from  "previous  Unremarkable ROS and PE suggestive of cardiac process at this time  CTM     Ppx: Apixaban  Diet: Diabetic  Code: Full     Disposition: pending definitive post-discharge plans    Vi Bains MD (Nak)  [facility] Internal Medicine, PGY-1    [facility] Medicine Hospitalist Pager numbers:   [facility] Hospitalist Medicine Team A (Lorne/Riki): [phone]  [facility] Hospitalist Medicine Team B (Arik/Lucio):  [phone]      "

## 2023-03-20 NOTE — PT/OT/SLP PROGRESS
Physical Therapy      Patient Name:  Saji Castañeda   MRN:  [id number]    Patient not seen today secondary to Other (Comment), Patient fatigue (Pt reported he wanted to sleep, PT explained that it is important that he work on transfers. pt still declined and reported he will tomorrow.). Will follow-up 3/21/23.

## 2023-03-20 NOTE — PLAN OF CARE
Problem: Adult Inpatient Plan of Care  Goal: Plan of Care Review  Outcome: Ongoing, Progressing     Problem: Adult Inpatient Plan of Care  Goal: Absence of Hospital-Acquired Illness or Injury  Outcome: Ongoing, Progressing     Problem: Diabetes Comorbidity  Goal: Blood Glucose Level Within Targeted Range  Outcome: Ongoing, Progressing     Problem: Impaired Wound Healing  Goal: Optimal Wound Healing  Outcome: Ongoing, Progressing     Problem: Fall Injury Risk  Goal: Absence of Fall and Fall-Related Injury  Outcome: Ongoing, Progressing     Problem: Skin or Soft Tissue Infection  Goal: Absence of Infection Signs and Symptoms  Outcome: Ongoing, Progressing     Problem: Skin Injury Risk Increased  Goal: Skin Health and Integrity  Outcome: Ongoing, Progressing     Problem: Infection  Goal: Absence of Infection Signs and Symptoms  Outcome: Ongoing, Progressing

## 2023-03-20 NOTE — PHYSICIAN QUERY
PT Name: Saji Castañeda  MR #: [id number]     Documentation Clarification      CDS/: Gilma Aranda RN BSN            Contact information:[email or web address]    This form is a permanent document in the medical record.     Query Date: March 20, 2023    By submitting this query, we are merely seeking further clarification of documentation. Please utilize your independent clinical judgment when addressing the question(s) below.    The Medical Record reflects the following:    Supporting Clinical Findings Location in Medical Record     Location:  Left foot    Location:  Left Midfoot  Type of Debridement:  Excisional  Depth of debridement:  Bone 3/10/23 Procedure                                                                                Provider, please specify bone that was debrided Left Midfoot:    [ x  ] Tarsal   [   ] Cuneiform   [   ] Navicular   [   ] Cuboid   [   ] Other (please specify): ____________

## 2023-03-20 NOTE — PHYSICIAN QUERY
PT Name: Saji Castañeda  MR #: [id number]     Documentation Clarification      CDS/: Gilma Aranda RN BSN            Contact information:[email or web address]    This form is a permanent document in the medical record.     Query Date: March 20, 2023    By submitting this query, we are merely seeking further clarification of documentation. Please utilize your independent clinical judgment when addressing the question(s) below.    The Medical Record reflects the following:    Supporting Clinical Findings Location in Medical Record   Location:  Left foot    Location:  Left Plantar  Tissue debrided:  Bone    Devitalized tissue debrided:  Necrotic/Eschar and Biofilm 3/10/23 Procedure                                                                                Provider, please specify bone that was debrided Left Plantar:    [  x ] Calcaneus   [   ] Tarsal   [   ] Other (please specify): ____________

## 2023-03-20 NOTE — PT/OT/SLP PROGRESS
Occupational Therapy   Treatment    Name: Saji Castañeda  MRN: [id number]  Admitting Diagnosis:  Acute osteomyelitis of right ankle       Recommendations:     Discharge Recommendations: other (see comments) (post acute placement)  Discharge Equipment Recommendations:  other (see comments) (TBD)  Barriers to discharge:  Other (Comment) (Increased level of assistance)    Assessment:     Saji Castañeda is a 73 y.o. male with a medical diagnosis of Acute osteomyelitis of right ankle.   Performance deficits affecting function are weakness, impaired endurance, impaired self care skills, impaired functional mobility, gait instability, impaired balance, decreased upper extremity function, decreased lower extremity function, decreased ROM, impaired coordination, impaired skin, edema, orthopedic precautions. Pt found in bed, agreeable to therapy.  Pt requires Supervision with bed mobility and tolerated EOB sitting ~20 min while performing BUE/BLE therex.  Pt tolerated well. Continue OT services to address functional goals, progressing as able.      Rehab Prognosis:  Good; patient would benefit from acute skilled OT services to address these deficits and reach maximum level of function.       Plan:     Patient to be seen 3 x/week to address the above listed problems via self-care/home management, therapeutic activities, therapeutic exercises  Plan of Care Expires: 04/16/23  Plan of Care Reviewed with: patient    Subjective     Pain/Comfort:  Pain Rating 1: 0/10  Pain Rating Post-Intervention 1: 0/10    Objective:     Communicated with: RN prior to session.  Patient found HOB elevated with bed alarm, Condom Catheter, peripheral IV, telemetry upon OT entry to room.    General Precautions: Standard, fall    Orthopedic Precautions:RLE non weight bearing, LLE non weight bearing  Braces: N/A  Respiratory Status: Room air     Occupational Performance:     Bed Mobility:    Patient completed Rolling/Turning to Right with  supervision  Patient completed Scooting/Bridging with supervision to scoot seated to EOB  Patient completed Supine to Sit with supervision, HOB elevated        AMPAC 6 Click ADL: 17    Treatment & Education:  Pt sat EOB at Mod I level while performing BUE and BLE AROM ex 2 x 10 reps all jts/planes. Pt tolerated well with rest breaks 2/2 muscle fatigue.      Patient left sitting edge of bed with all lines intact, call button in reach, bed alarm on, and RN notified    GOALS:   Multidisciplinary Problems       Occupational Therapy Goals          Problem: Occupational Therapy    Goal Priority Disciplines Outcome Interventions   Occupational Therapy Goal     OT, PT/OT Ongoing, Progressing    Description: Goals to be met by: 4/16/2023     Patient will increase functional independence with ADLs by performing:    UE Dressing with Set-up Assistance.  Grooming while seated with Set-up Assistance.  Toileting from drop-arm bedside commode with Supervision for hygiene and clothing management.   Rolling to Bilateral with Modified [place].   Supine to sit with Modified [place].  Toilet transfer to bedside commode with Minimal Assistance & use of slide board.                         Time Tracking:     OT Date of Treatment: 03/20/23  OT Start Time: 1145  OT Stop Time: 1210  OT Total Time (min): 25 min    Billable Minutes:Therapeutic Activity 15  Therapeutic Exercise 10    3/20/2023

## 2023-03-20 NOTE — PROGRESS NOTES
RSW met with patient to discuss discharge and additional patient barriers to care. Pt identified no additional social barriers to care. Per pt, pt is not in need of resources at this time.    Silva Guillen, LARON

## 2023-03-20 NOTE — PROGRESS NOTES
Pharmacokinetic Assessment Follow Up: IV Vancomycin    Vancomycin serum concentration assessment(s):    The trough level was drawn correctly and can be used to guide therapy at this time. The measurement is within the desired definitive target range of 15 to 20 mcg/mL.    Vancomycin Regimen Plan:    Continue regimen to Vancomycin 1250 mg IV every 24 hours with next serum trough concentration measured at 1200 prior to 3rd dose on 03/20/23    Drug levels (last 3 results):  Recent Labs   Lab Result Units 03/18/23  0033 03/20/23  1200   Vancomycin-Trough ug/mL 21.3 16.1       Pharmacy will continue to follow and monitor vancomycin.    Please contact pharmacy at extension [phone] for questions regarding this assessment.    Thank you for the consult,   Carlyn Baumann       Patient brief summary:  Saji Castañeda is a 73 y.o. male initiated on antimicrobial therapy with IV Vancomycin for treatment of bone/joint infection    The patient's current regimen is 1250 q 24 h    Drug Allergies:   Review of patient's allergies indicates:  No Known Allergies    Actual Body Weight:   125.1 kg    Renal Function:   Estimated Creatinine Clearance: 47.1 mL/min (A) (based on SCr of 1.8 mg/dL (H)).,     Dialysis Method (if applicable):  N/A    CBC (last 72 hours):  Recent Labs   Lab Result Units 03/19/23  0445   WBC K/uL 8.04   Hemoglobin g/dL 8.3*   Hematocrit % 26.3*   Platelets K/uL 489*   Gran % % 69.2   Lymph % % 17.0*   Mono % % 7.2   Eosinophil % % 5.6   Basophil % % 0.6   Differential Method  Automated       Metabolic Panel (last 72 hours):  Recent Labs   Lab Result Units 03/19/23  0445   Sodium mmol/L 137   Potassium mmol/L 4.2   Chloride mmol/L 102   CO2 mmol/L 25   Glucose mg/dL 200*   BUN mg/dL 39*   Creatinine mg/dL 1.8*   Albumin g/dL 2.1*   Total Bilirubin mg/dL 0.1   Alkaline Phosphatase U/L 49*   AST U/L 21   ALT U/L 19       Vancomycin Administrations:  vancomycin given in the last 96 hours                     vancomycin  1,250 mg in dextrose 5 % (D5W) 250 mL IVPB (Vial-Mate) (mg) 1,250 mg New Bag 03/20/23 1359     1,250 mg New Bag 03/19/23 1333     1,250 mg New Bag 03/18/23 1352    vancomycin 750 mg in dextrose 5 % (D5W) 250 mL IVPB (Vial-Mate) (mg) 750 mg New Bag 03/17/23 1325     750 mg New Bag  0206                    Microbiologic Results:  Microbiology Results (last 7 days)       Procedure Component Value Units Date/Time    Blood culture [[id number]] Collected: 03/12/23 0647    Order Status: Completed Specimen: Blood from Antecubital, Left Updated: 03/17/23 1412     Blood Culture, Routine No growth after 5 days.    Blood culture [[id number]] Collected: 03/12/23 0648    Order Status: Completed Specimen: Blood Updated: 03/17/23 1412     Blood Culture, Routine No growth after 5 days.    Blood culture #2 **CANNOT BE ORDERED STAT** [[id number]] Collected: 03/10/23 0608    Order Status: Completed Specimen: Blood Updated: 03/15/23 1612     Blood Culture, Routine No growth after 5 days.    Culture, Anaerobe [[id number]] Collected: 03/10/23 1741    Order Status: Completed Specimen: Wound from Leg, Left Updated: 03/15/23 1103     Anaerobic Culture No anaerobes isolated

## 2023-03-20 NOTE — PROGRESS NOTES
Wound care follow up:  Wound dressing changed to BLE wounds- nurse assisted with dressing changes.     R anterior ankle- 3x5x.2cm- bone/tendon exposed- wound vac dressing changed as directed        L heel- granulation tissue increasing- dressing changed as directed        R calf- less bloody drainage to hard lesion- dressing changed as directed

## 2023-03-21 LAB
ALBUMIN SERPL BCP-MCNC: 2.2 G/DL (ref 3.5–5.2)
ALP SERPL-CCNC: 46 U/L (ref 55–135)
ALT SERPL W/O P-5'-P-CCNC: 16 U/L (ref 10–44)
ANION GAP SERPL CALC-SCNC: 7 MMOL/L (ref 8–16)
AST SERPL-CCNC: 14 U/L (ref 10–40)
BASOPHILS # BLD AUTO: 0.04 K/UL (ref 0–0.2)
BASOPHILS NFR BLD: 0.5 % (ref 0–1.9)
BILIRUB SERPL-MCNC: 0.1 MG/DL (ref 0.1–1)
BUN SERPL-MCNC: 36 MG/DL (ref 8–23)
CALCIUM SERPL-MCNC: 8.8 MG/DL (ref 8.7–10.5)
CHLORIDE SERPL-SCNC: 103 MMOL/L (ref 95–110)
CO2 SERPL-SCNC: 27 MMOL/L (ref 23–29)
CREAT SERPL-MCNC: 1.5 MG/DL (ref 0.5–1.4)
DIFFERENTIAL METHOD: ABNORMAL
EOSINOPHIL # BLD AUTO: 0.5 K/UL (ref 0–0.5)
EOSINOPHIL NFR BLD: 5.8 % (ref 0–8)
ERYTHROCYTE [DISTWIDTH] IN BLOOD BY AUTOMATED COUNT: 15.9 % (ref 11.5–14.5)
EST. GFR  (NO RACE VARIABLE): 49 ML/MIN/1.73 M^2
GLUCOSE SERPL-MCNC: 144 MG/DL (ref 70–110)
HCT VFR BLD AUTO: 27.5 % (ref 40–54)
HGB BLD-MCNC: 8.5 G/DL (ref 14–18)
IMM GRANULOCYTES # BLD AUTO: 0.02 K/UL (ref 0–0.04)
IMM GRANULOCYTES NFR BLD AUTO: 0.2 % (ref 0–0.5)
LYMPHOCYTES # BLD AUTO: 1.6 K/UL (ref 1–4.8)
LYMPHOCYTES NFR BLD: 18.8 % (ref 18–48)
MCH RBC QN AUTO: 26.1 PG (ref 27–31)
MCHC RBC AUTO-ENTMCNC: 30.9 G/DL (ref 32–36)
MCV RBC AUTO: 84 FL (ref 82–98)
MONOCYTES # BLD AUTO: 0.7 K/UL (ref 0.3–1)
MONOCYTES NFR BLD: 7.9 % (ref 4–15)
NEUTROPHILS # BLD AUTO: 5.7 K/UL (ref 1.8–7.7)
NEUTROPHILS NFR BLD: 66.8 % (ref 38–73)
NRBC BLD-RTO: 0 /100 WBC
PLATELET # BLD AUTO: 506 K/UL (ref 150–450)
PMV BLD AUTO: 8.4 FL (ref 9.2–12.9)
POCT GLUCOSE: 146 MG/DL (ref 70–110)
POCT GLUCOSE: 177 MG/DL (ref 70–110)
POCT GLUCOSE: 192 MG/DL (ref 70–110)
POCT GLUCOSE: 203 MG/DL (ref 70–110)
POTASSIUM SERPL-SCNC: 4.1 MMOL/L (ref 3.5–5.1)
PROT SERPL-MCNC: 7.2 G/DL (ref 6–8.4)
RBC # BLD AUTO: 3.26 M/UL (ref 4.6–6.2)
SODIUM SERPL-SCNC: 137 MMOL/L (ref 136–145)
WBC # BLD AUTO: 8.46 K/UL (ref 3.9–12.7)

## 2023-03-21 PROCEDURE — 25000003 PHARM REV CODE 250

## 2023-03-21 PROCEDURE — 25000003 PHARM REV CODE 250: Performed by: INTERNAL MEDICINE

## 2023-03-21 PROCEDURE — 85025 COMPLETE CBC W/AUTO DIFF WBC: CPT | Performed by: STUDENT IN AN ORGANIZED HEALTH CARE EDUCATION/TRAINING PROGRAM

## 2023-03-21 PROCEDURE — A4216 STERILE WATER/SALINE, 10 ML: HCPCS | Performed by: STUDENT IN AN ORGANIZED HEALTH CARE EDUCATION/TRAINING PROGRAM

## 2023-03-21 PROCEDURE — 25000003 PHARM REV CODE 250: Performed by: STUDENT IN AN ORGANIZED HEALTH CARE EDUCATION/TRAINING PROGRAM

## 2023-03-21 PROCEDURE — 63600175 PHARM REV CODE 636 W HCPCS: Performed by: STUDENT IN AN ORGANIZED HEALTH CARE EDUCATION/TRAINING PROGRAM

## 2023-03-21 PROCEDURE — 11000001 HC ACUTE MED/SURG PRIVATE ROOM

## 2023-03-21 PROCEDURE — 63600175 PHARM REV CODE 636 W HCPCS: Mod: TB,JG | Performed by: INTERNAL MEDICINE

## 2023-03-21 PROCEDURE — 80053 COMPREHEN METABOLIC PANEL: CPT | Performed by: STUDENT IN AN ORGANIZED HEALTH CARE EDUCATION/TRAINING PROGRAM

## 2023-03-21 PROCEDURE — 97530 THERAPEUTIC ACTIVITIES: CPT | Mod: CO

## 2023-03-21 PROCEDURE — 97530 THERAPEUTIC ACTIVITIES: CPT | Mod: CQ

## 2023-03-21 RX ORDER — ACETAMINOPHEN 325 MG/1
650 TABLET ORAL EVERY 6 HOURS PRN
Status: DISCONTINUED | OUTPATIENT
Start: 2023-03-21 | End: 2023-03-22 | Stop reason: HOSPADM

## 2023-03-21 RX ADMIN — SODIUM CHLORIDE, PRESERVATIVE FREE 10 ML: 5 INJECTION INTRAVENOUS at 06:03

## 2023-03-21 RX ADMIN — ISOSORBIDE DINITRATE 10 MG: 10 TABLET ORAL at 10:03

## 2023-03-21 RX ADMIN — HYDRALAZINE HYDROCHLORIDE 25 MG: 25 TABLET, FILM COATED ORAL at 10:03

## 2023-03-21 RX ADMIN — METRONIDAZOLE 500 MG: 500 TABLET ORAL at 01:03

## 2023-03-21 RX ADMIN — INSULIN ASPART 15 UNITS: 100 INJECTION, SOLUTION INTRAVENOUS; SUBCUTANEOUS at 10:03

## 2023-03-21 RX ADMIN — OXYCODONE HYDROCHLORIDE AND ACETAMINOPHEN 500 MG: 500 TABLET ORAL at 09:03

## 2023-03-21 RX ADMIN — PANTOPRAZOLE SODIUM 40 MG: 40 TABLET, DELAYED RELEASE ORAL at 10:03

## 2023-03-21 RX ADMIN — ISOSORBIDE DINITRATE 10 MG: 10 TABLET ORAL at 03:03

## 2023-03-21 RX ADMIN — ASPIRIN 81 MG: 81 TABLET, COATED ORAL at 10:03

## 2023-03-21 RX ADMIN — INSULIN ASPART 4 UNITS: 100 INJECTION, SOLUTION INTRAVENOUS; SUBCUTANEOUS at 05:03

## 2023-03-21 RX ADMIN — CEFTRIAXONE SODIUM 2 G: 2 INJECTION, POWDER, FOR SOLUTION INTRAMUSCULAR; INTRAVENOUS at 10:03

## 2023-03-21 RX ADMIN — SODIUM CHLORIDE, PRESERVATIVE FREE 10 ML: 5 INJECTION INTRAVENOUS at 12:03

## 2023-03-21 RX ADMIN — INSULIN ASPART 2 UNITS: 100 INJECTION, SOLUTION INTRAVENOUS; SUBCUTANEOUS at 10:03

## 2023-03-21 RX ADMIN — HYDROCHLOROTHIAZIDE 25 MG: 25 TABLET ORAL at 10:03

## 2023-03-21 RX ADMIN — APIXABAN 5 MG: 5 TABLET, FILM COATED ORAL at 10:03

## 2023-03-21 RX ADMIN — SODIUM CHLORIDE, PRESERVATIVE FREE 10 ML: 5 INJECTION INTRAVENOUS at 01:03

## 2023-03-21 RX ADMIN — HYDRALAZINE HYDROCHLORIDE 25 MG: 25 TABLET, FILM COATED ORAL at 09:03

## 2023-03-21 RX ADMIN — GABAPENTIN 200 MG: 100 CAPSULE ORAL at 09:03

## 2023-03-21 RX ADMIN — TAMSULOSIN HYDROCHLORIDE 0.4 MG: 0.4 CAPSULE ORAL at 10:03

## 2023-03-21 RX ADMIN — CLOPIDOGREL BISULFATE 75 MG: 75 TABLET ORAL at 10:03

## 2023-03-21 RX ADMIN — ISOSORBIDE DINITRATE 10 MG: 10 TABLET ORAL at 09:03

## 2023-03-21 RX ADMIN — INSULIN ASPART 15 UNITS: 100 INJECTION, SOLUTION INTRAVENOUS; SUBCUTANEOUS at 05:03

## 2023-03-21 RX ADMIN — INSULIN ASPART 15 UNITS: 100 INJECTION, SOLUTION INTRAVENOUS; SUBCUTANEOUS at 01:03

## 2023-03-21 RX ADMIN — APIXABAN 5 MG: 5 TABLET, FILM COATED ORAL at 09:03

## 2023-03-21 RX ADMIN — ACETAMINOPHEN 650 MG: 325 TABLET ORAL at 01:03

## 2023-03-21 RX ADMIN — LOSARTAN POTASSIUM 100 MG: 50 TABLET, FILM COATED ORAL at 10:03

## 2023-03-21 RX ADMIN — HYDRALAZINE HYDROCHLORIDE 25 MG: 25 TABLET, FILM COATED ORAL at 03:03

## 2023-03-21 RX ADMIN — VANCOMYCIN HYDROCHLORIDE 1250 MG: 1.25 INJECTION, POWDER, LYOPHILIZED, FOR SOLUTION INTRAVENOUS at 01:03

## 2023-03-21 RX ADMIN — GABAPENTIN 200 MG: 100 CAPSULE ORAL at 10:03

## 2023-03-21 RX ADMIN — NIFEDIPINE 60 MG: 30 TABLET, FILM COATED, EXTENDED RELEASE ORAL at 10:03

## 2023-03-21 RX ADMIN — METRONIDAZOLE 500 MG: 500 TABLET ORAL at 09:03

## 2023-03-21 RX ADMIN — METRONIDAZOLE 500 MG: 500 TABLET ORAL at 06:03

## 2023-03-21 RX ADMIN — ATORVASTATIN CALCIUM 80 MG: 40 TABLET, FILM COATED ORAL at 10:03

## 2023-03-21 NOTE — PLAN OF CARE
Problem: Physical Therapy  Goal: Physical Therapy Goal  Description: Goals to be met by: 23     Patient will increase functional independence with mobility by performin. Supine to sit with Stand-by Assistance Met 3/21/23  2. Sit to supine with Stand-by Assistance  3. Rolling to Left and Right with Stand-by Assistance. Met 3/21/23  4. Bed to chair transfer with Stand-by Assistance using Slideboard  5. Wheelchair propulsion x 80 feet with Modified [place] using bilateral upper extremities    Outcome: Ongoing, Progressing

## 2023-03-21 NOTE — PT/OT/SLP PROGRESS
Physical Therapy Treatment    Patient Name:  Saji Castañeda   MRN:  [id number]    Recommendations:     Discharge Recommendations:  (TBD pending progress though appears he will need post acute placement)  Discharge Equipment Recommendations: wheelchair (Pt needs a new wheelchair, brakes are broken on one side. Not sure when pt last received a wheelchair.)  Barriers to discharge:  decreased functional mobility    Assessment:     Saji Castañeda is a 73 y.o. male admitted with a medical diagnosis of Acute osteomyelitis of right ankle.  He presents with the following impairments/functional limitations: weakness, impaired endurance, impaired self care skills, impaired functional mobility, impaired balance, decreased ROM, orthopedic precautions, decreased lower extremity function, decreased upper extremity function, impaired skin Pt would continue to benefit from P.T. To address impairments listed above.  .    Rehab Prognosis: Fair; patient would benefit from acute skilled PT services to address these deficits and reach maximum level of function.    Recent Surgery: * No surgery found *      Plan:     During this hospitalization, patient to be seen 3 x/week to address the identified rehab impairments via therapeutic activities, therapeutic exercises, neuromuscular re-education, wheelchair management/training and progress toward the following goals:    Plan of Care Expires:  04/16/23    Subjective     Patient/Family Comments/goals: Pt agreed to tx.  Pain/Comfort:  Pain Rating 1: 0/10  Pain Rating Post-Intervention 1: 0/10      Objective:     Communicated with RN prior to session.  Patient found up in chair with bed alarm, Condom Catheter, peripheral IV, telemetry, pressure relief boots, wound vac upon PT entry to room.     General Precautions: Standard, fall  Orthopedic Precautions: RLE non weight bearing, LLE non weight bearing  Braces: N/A  Respiratory Status: Room air     Functional Mobility:  Bed Mobility:     Sit to  Supine: modified independence  Transfers:     Chair to EOB maximal assistance and of 2 persons with  slide board  scooting with assist to maintain NWB BLEs  Balance: sitting good      AM-PAC 6 CLICK MOBILITY  Turning over in bed (including adjusting bedclothes, sheets and blankets)?: 4  Sitting down on and standing up from a chair with arms (e.g., wheelchair, bedside commode, etc.): 1  Moving from lying on back to sitting on the side of the bed?: 4  Moving to and from a bed to a chair (including a wheelchair)?: 2  Need to walk in hospital room?: 1  Climbing 3-5 steps with a railing?: 1  Basic Mobility Total Score: 13       Treatment & Education:  Pt tolerated sitting up in w/c x 2 hours today.  Transfer from w/c to EOB via sliding board scooting up hill with Max A of 2 for scooting and to maintain NWB BLEs with vc's for sequencing and hand placement.  Pt had increased difficulty transferring up hill to EOB and will require more training to master.  Pt remained supine with HOB elevated and pressure relief boots on at end of tx.    Patient left supine with all lines intact, call button in reach, bed alarm on, and RN notified..    GOALS:   Multidisciplinary Problems       Physical Therapy Goals          Problem: Physical Therapy    Goal Priority Disciplines Outcome Goal Variances Interventions   Physical Therapy Goal     PT, PT/OT Ongoing, Progressing     Description: Goals to be met by: 23     Patient will increase functional independence with mobility by performin. Supine to sit with Stand-by Assistance  2. Sit to supine with Stand-by Assistance  3. Rolling to Left and Right with Stand-by Assistance.  4. Bed to chair transfer with Stand-by Assistance using Slideboard  5. Wheelchair propulsion x 80 feet with Modified [place] using bilateral upper extremities                         Time Tracking:     PT Received On: 23  PT Start Time: 1355     PT Stop Time: 1419  PT Total Time (min): 24 min      Billable Minutes: Therapeutic Activity 24    Treatment Type: Treatment  PT/PTA: PTA     Number of PTA visits since last PT visit: 4     03/21/2023

## 2023-03-21 NOTE — PROGRESS NOTES
Auth obtained per [facility] -- the  from the NH went to the pt's sister's home to complete paperwork -- the sister misunderstood the apt time and went to her doctor. The NH  will meet with sister late today or early tomm -- for DC tomorrow   Dr. Yan informed.

## 2023-03-21 NOTE — PROGRESS NOTES
"[facility]  [phone]   [place] went to pt's home to meet with sister Kandy to complete paperwork but no one answered the door - [place] had an apt with Ms. Gaitan.    LEW called sister numerous times and left messages asking that she get in touch with [place] -- also tried to reach pt's brother and other sister Ai.  No Answer.      LEW met with pt per [facility] - asked him to call his family with his phone.  His brother answered and told pt "Kandy had to go to the doctor today".  LEW and [place] will keep trying to reach pt's family to get paperwork done.    Kandy Castañeda  Sister  [phone]  Notify on admission  Ai Nicholson  Sister  [phone] -- LEW SPOKE WITH PT'S SISTER AI - SHE WILL ASK MS. GAITAN TO CALL [place] OR  ASAP.    Notify on admission  Noe Nicholson  Brother  [phone]         "

## 2023-03-21 NOTE — PLAN OF CARE
"  Problem: Adult Inpatient Plan of Care  Goal: Plan of Care Review  Outcome: Ongoing, Progressing     Problem: Adult Inpatient Plan of Care  Goal: Optimal Comfort and Wellbeing  Outcome: Ongoing, Progressing     Problem: Diabetes Comorbidity  Goal: Blood Glucose Level Within Targeted Range  Outcome: Ongoing, Progressing     Problem: Impaired Wound Healing  Goal: Optimal Wound Healing  Outcome: Ongoing, Progressing     Problem: Hypertension Comorbidity  Goal: Blood Pressure in Desired Range  Outcome: Ongoing, Progressing     Problem: Skin or Soft Tissue Infection  Goal: Absence of Infection Signs and Symptoms  Outcome: Ongoing, Progressing     .Plan of care reviewed with the patient. Scheduled medicines given and the patient tolerated well.  Fall and safety precautions taken and the standard interventions are in place. On Telemetry monitoring with  Bradycardia Type I Mobitz and the HR was in 40s through out but the patient was asymptomatic, reported to MD Dr. Cordon, no true "red alarms' noted, no acute distress reported either in the shift. Patient's Accu Check was 225 mg/dl, covered with 2 Units of Aspart.  Patient's BP was 177/71 and 181/76, reported to Dr. Cordon and was asked to monitor the patient. Advised the patient to call for the assistance. Continued monitoring the patient.   "

## 2023-03-21 NOTE — PLAN OF CARE
Problem: Adult Inpatient Plan of Care  Goal: Plan of Care Review  3/21/2023 0912 by Judy C Jolissaint, RN  Outcome: Ongoing, Progressing

## 2023-03-21 NOTE — PLAN OF CARE
[facility]    FACILITY TRANSFER ORDERS      Patient Name: Saji Castañeda  YOB: 1950    PCP: [facility]   PCP Address: [address] / [place]  PCP Phone Number: [phone]  PCP Fax: [phone]    Encounter Date: 03/21/2023    Admit to: [facility]    Vital Signs:  Routine    Diagnoses:   Active Hospital Problems    Diagnosis  POA    *Acute osteomyelitis of right ankle [M86.171]  Yes    Subacute osteomyelitis of left foot [M86.272]  Yes    Peripheral arterial disease [I73.9]  Yes     Chronic    Diabetic neuropathy [E11.40]  Yes     Chronic      Resolved Hospital Problems   No resolved problems to display.       Allergies:Review of patient's allergies indicates:  No Known Allergies    Diet: cardiac diet, diabetic diet: 2000 calorie, and renal diet    Activities: Activity as tolerated    Goals of Care Treatment Preferences:  Code Status: Full Code    Nursing: Routine care.     Labs: CBC, BMP, ESR, CRP, and Vanc trough, CPK.   Weekly and fax to [phone] ATTN: Dr. Gayle.      CONSULTS:    Physical Therapy to evaluate and treat.  and Occupational Therapy to evaluate and treat.    MISCELLANEOUS CARE:  Diabetes Care:   SN to perform and educate Diabetic management with blood glucose monitoring: and Fingerstick blood sugar AC and HS    WOUND CARE ORDERS  Yes: Foot Ulcer:  Location: Bilateral lower extremities  Right lower extremity with wound vac placed. Empty wound vac canister daily. Dressing changes q2x per week. Wound vac change q2x per week. place vaseline gauze to tendon and covered with black foam.    Right Lower Extremity: Daily dressing changes with hydrofera blue    IV Infusion Therapy   Administer (drug and dose): IV Vancomycin 1250 mg daily end date 4/21/23  Last dose given: 1400 on 3/21/23        Next dose Due 1400 3/22/23     Administer (drug and dose): IV Ceftriaxone 2g daily 4/21/23  Last dose given: 2200 3/21/23              Next dose due:  2200 3/22/23     Administer (drug and dose): Oral Metronidazole 500 TID, end date 4/21/23  Last dose given: 0600 3/22/23           Next dose dose due:1400 3/22/23     Scrub the Hub: Prior to accessing the line, always perform a 30 second alcohol scrub  Each lumen of the central line is to be flushed at least daily with 10 mL Normal Saline and 3 mL Heparin flush (10 units/mL)  Skilled Nurse (SN) may draw blood from IV access  Blood Draw Procedure:              - Aspirate at least 5 mL of blood              - Discard              - Obtain specimen              - Change injection cap              - Flush with 20 mL Normal Saline followed by a                 3-5 mL Heparin flush (10 units/mL)  Central :              - Sterile dressing changes are done weekly and as needed.              - Use chlor-hexadine scrub to cleanse site, apply Biopatch to insertion site,                 apply securement device dressing              - Injection caps are changed weekly and after EVERY lab draw.              - If sterile gauze is under dressing to control oozing,                 dressing change must be performed every 24 hours until gauze is not needed.    Medications: Review discharge medications with patient and family and provide education.      Current Discharge Medication List        START taking these medications    Details   dextrose 5 % (D5W) SolP 250 mL with vancomycin 1.25 gram SolR 1,250 mg Inject 1,250 mg into the vein once daily.      dextrose 5 % in water (D5W) 5 % PgBk 50 mL with cefTRIAXone 2 gram SolR 2 g Inject 2 g into the vein once daily.      metroNIDAZOLE (FLAGYL) 500 MG tablet Take 1 tablet (500 mg total) by mouth every 8 (eight) hours.  Qty: 108 tablet, Refills: 0           CONTINUE these medications which have NOT CHANGED    Details   aspirin (ECOTRIN) 81 MG EC tablet Take 81 mg by mouth once daily.      B COMPLEX-VITAMIN B12 tablet Take 1 tablet by mouth once daily.      clopidogreL  "(PLAVIX) 75 mg tablet Take 1 tablet (75 mg total) by mouth once daily.  Qty: 60 tablet, Refills: 0      furosemide (LASIX) 40 MG tablet Take 40 mg by mouth every 48 hours.      LANTUS SOLOSTAR U-100 INSULIN glargine 100 units/mL SubQ pen Inject 45 Units into the skin every evening.  Refills: 0      losartan-hydrochlorothiazide 100-25 mg (HYZAAR) 100-25 mg per tablet Take 1 tablet by mouth once daily.      metFORMIN (GLUCOPHAGE-XR) 500 MG ER 24hr tablet Take 1,000 mg by mouth 2 (two) times a day.      NIFEdipine (ADALAT CC) 60 MG TbSR Take 60 mg by mouth once daily.      pantoprazole (PROTONIX) 40 MG tablet Take 40 mg by mouth once daily.      rosuvastatin (CRESTOR) 40 MG Tab Take 40 mg by mouth once daily.      tamsulosin (FLOMAX) 0.4 mg Cap Take 0.4 mg by mouth once daily.      apixaban (ELIQUIS) 5 mg Tab Take 1 tablet (5 mg total) by mouth 2 (two) times daily.  Qty: 60 tablet, Refills: 0      ascorbic acid, vitamin C, (VITAMIN C) 500 MG tablet Take 500 mg by mouth once daily.      BD ULTRA-FINE ADITYA PEN NEEDLE 32 gauge x 5/32" Ndle USE FOUR TIMES DAILY WITH MEALS AND NIGHTLY  Qty: 100 each, Refills: 0    Associated Diagnoses: Type II diabetes mellitus, uncontrolled      blood sugar diagnostic (CONTOUR TEST STRIPS) Strp Inject 1 strip into the skin 2 (two) times daily before meals.  Qty: 100 strip, Refills: 6    Comments: To use with Contour Ascencia meter  Associated Diagnoses: Type II diabetes mellitus, uncontrolled      gabapentin (NEURONTIN) 100 MG capsule Take 2 capsules (200 mg total) by mouth 2 (two) times daily.  Qty: 120 capsule, Refills: 1      glipiZIDE (GLUCOTROL) 10 MG tablet Take 20 mg by mouth 2 (two) times a day.      hydrALAZINE (APRESOLINE) 25 MG tablet Take 1 tablet (25 mg total) by mouth 3 (three) times daily.  Qty: 90 tablet, Refills: 11    Comments: .      insulin aspart U-100 (NOVOLOG) 100 unit/mL (3 mL) InPn pen Inject 20 Units into the skin 3 (three) times daily with meals.  Refills: 0    "   isosorbide dinitrate (ISORDIL) 10 MG tablet Take 1 tablet (10 mg total) by mouth 3 (three) times daily.  Qty: 90 tablet, Refills: 11      Lactobacillus rhamnosus GG (CULTURELLE) 10 billion cell capsule Take 1 capsule by mouth 2 (two) times daily.      MICROLET LANCET Misc Refills: 0           STOP taking these medications       acetaminophen (TYLENOL) 500 MG tablet Comments:   Reason for Stopping:                  Immunizations Administered as of 3/21/2023       No immunizations on file.            This patient has had both covid vaccinations    Some patients may experience side effects after vaccination.  These may include fever, headache, muscle or joint aches.  Most symptoms resolve with 24-48 hours and do not require urgent medical evaluation unless they persist for more than 72 hours or symptoms are concerning for an unrelated medical condition.        Seymour Yan MD  03/21/2023

## 2023-03-21 NOTE — PT/OT/SLP PROGRESS
Physical Therapy Treatment    Patient Name:  Saji Castañeda   MRN:  [id number]    Recommendations:     Discharge Recommendations:  (TBD pending progress though appears he will need post acute placement)  Discharge Equipment Recommendations: wheelchair (Pt needs a new wheelchair, brakes are broken on one side. Not sure when pt last received a wheelchair.)  Barriers to discharge:  decreased functional mobility/caregiver support    Assessment:     Saji Castañeda is a 73 y.o. male admitted with a medical diagnosis of Acute osteomyelitis of right ankle.  He presents with the following impairments/functional limitations: weakness, impaired endurance, impaired self care skills, impaired functional mobility, impaired balance, impaired coordination, decreased coordination, decreased upper extremity function, decreased lower extremity function, orthopedic precautions, edema, impaired skin Pt would continue to benefit from P.T. To address impairments listed above.  .    Rehab Prognosis: Fair; patient would benefit from acute skilled PT services to address these deficits and reach maximum level of function.    Recent Surgery: * No surgery found *      Plan:     During this hospitalization, patient to be seen 3 x/week to address the identified rehab impairments via therapeutic activities, therapeutic exercises, neuromuscular re-education, wheelchair management/training and progress toward the following goals:    Plan of Care Expires:  04/16/23    Subjective       Patient/Family Comments/goals: Pt agreed to tx.  Pain/Comfort:  Pain Rating 1: 0/10  Pain Rating Post-Intervention 1: 0/10      Objective:     Communicated with RN prior to session.  Patient found supine with bed alarm, Condom Catheter, peripheral IV, telemetry, wound vac upon PT entry to room.     General Precautions: Standard, fall  Orthopedic Precautions: RLE non weight bearing, LLE non weight bearing  Braces: N/A  Respiratory Status: Room air     Functional  Mobility:  Bed Mobility:     Rolling Right: modified independence  Scooting: laterally along EOB to the right with SBA NWB BLEs with increased vc's/tc's for proper technique.  Minimal cleared buttock from bed.  Supine to Sit: modified independence  Transfers:     Bed to Chair: sliding board transfer EOB to w/c with Mod A of 1 and Maile of 1.  See below  Balance: sitting fair+/good       AM-PAC 6 CLICK MOBILITY  Turning over in bed (including adjusting bedclothes, sheets and blankets)?: 4  Sitting down on and standing up from a chair with arms (e.g., wheelchair, bedside commode, etc.): 1  Moving from lying on back to sitting on the side of the bed?: 4  Moving to and from a bed to a chair (including a wheelchair)?: 2  Need to walk in hospital room?: 1  Climbing 3-5 steps with a railing?: 1  Basic Mobility Total Score: 13       Treatment & Education:  Pt sat EOB with S. Pt instructed in scooting laterally to the right sitting EOB with vc's for proper technique with bed elevated to assist with pt maintaining NWB.  Minimal buttock clearance from EOB.  Pt was instructed on proper technique to perform sliding board transfers from EOB to w/c.  Pt required set up of board and assistance by PTA to maintain BLE NWB during transfer and assist by KATHLEEN with vc's for sequencing, occasional assist for proper hand placement, and Maile at end to scoot.  Sliding board removed and pt remained up in w/c.        Patient left up in chair with all lines intact, call button in reach, and RN notified..    GOALS:   Multidisciplinary Problems       Physical Therapy Goals          Problem: Physical Therapy    Goal Priority Disciplines Outcome Goal Variances Interventions   Physical Therapy Goal     PT, PT/OT Ongoing, Progressing     Description: Goals to be met by: 23     Patient will increase functional independence with mobility by performin. Supine to sit with Stand-by Assistance  2. Sit to supine with Stand-by Assistance  3.  Rolling to Left and Right with Stand-by Assistance.  4. Bed to chair transfer with Stand-by Assistance using Slideboard  5. Wheelchair propulsion x 80 feet with Modified [place] using bilateral upper extremities                         Time Tracking:     PT Received On: 03/21/23  PT Start Time: 1140     PT Stop Time: 1205  PT Total Time (min): 25 min     Billable Minutes: Therapeutic Activity 25  Overlap with KATHLEEN for portions of session due to complex nature of patient and for safety with mobility to decrease fall risk for patient and caregiver injury requiring two skilled therapists to provide different interventions.       PT/PTA: PTA     Number of PTA visits since last PT visit: 3     03/21/2023

## 2023-03-21 NOTE — PLAN OF CARE
Problem: Occupational Therapy  Goal: Occupational Therapy Goal  Description: Goals to be met by: 4/16/2023     Patient will increase functional independence with ADLs by performing:    UE Dressing with Set-up Assistance.  Grooming while seated with Set-up Assistance.  Toileting from drop-arm bedside commode with Supervision for hygiene and clothing management.   Rolling to Bilateral with Modified [place].   Supine to sit with Modified [place].  Toilet transfer to bedside commode with Minimal Assistance & use of slide board.    Outcome: Ongoing, Progressing   Saji Castañeda is a 73 y.o. male with a medical diagnosis of Acute osteomyelitis of right ankle.  Performance deficits affecting function are weakness, impaired endurance, impaired self care skills, impaired functional mobility, gait instability, impaired balance, decreased upper extremity function, decreased lower extremity function, pain, decreased ROM, impaired skin, edema, orthopedic precautions, impaired sensation. Pt found in bed, agreeable to therapy.  Pt requires increased assistance, Max A x 2 for sliding board transfers,  wc>EOB in order to maintain BLE NWB'ing.  Pt tolerated OOB in wc ~2 hours. Rec post acute placement at time of discharge to maximize [place] and safety with ADL's and functional mobility. Continue OT services to address functional goals, progressing as able.

## 2023-03-21 NOTE — PROGRESS NOTES
"[facility] Medicine Progress Note    Primary Team: [facility] Hospitalist Team B  Attending Physician: Kumar Valdes MD  Resident: Seymour Yan MD  Intern: Vi Bains MD (Nak)    Hospital Day: 10 days    Chief Complaint: Chronic BLE wounds w/ superimposed cellulitis worsening over 3wks    Subjective:   NAEON. Afebrile w/ stable vitals on room air. Pt seen by writer this morning. Pt has stable intermittent RLE pain in toes up to 4/10 pain scale. Denied having pain during interview. Denied issues/concerns w/ PICC line sight or BLE wounds at this time. Denied fevers, chills, N/V, SOB, chest pain, abd pain, BLE pains, or numbness/weakness.      Review of Systems   All other systems reviewed and are negative.      Objective:   Last 24 Hour Vital Signs:  BP  Min: 137/60  Max: 181/76  Temp  Av.9 °F (36.1 °C)  Min: 96.1 °F (35.6 °C)  Max: 97.6 °F (36.4 °C)  Pulse  Av  Min: 42  Max: 60  Resp  Avg: 15.8  Min: 12  Max: 18  SpO2  Av.1 %  Min: 94 %  Max: 98 %    Intake/Output Summary (Last 24 hours) at 3/21/2023 0800  Last data filed at 3/21/2023 0453  Gross per 24 hour   Intake --   Output 1050 ml   Net -1050 ml     Physical Examination:    Physical Exam   Constitutional: He is oriented to person, place, and time. normal appearance. He appears obese.  Non-toxic appearance. No distress. He does not appear ill.   HENT:   Mouth/Throat: Mucous membranes are moist. Oropharynx is clear.   Eyes: Pupils are equal, round, and reactive to light.   Cardiovascular: Normal rate, regular rhythm, normal heart sounds and normal pulses.  Pulmonary:     Effort: Pulmonary effort is normal.      Breath sounds: Normal breath sounds.   Abdominal: Soft. Normal appearance and bowel sounds are normal.   Musculoskeletal:         General: Swelling and deformity present on BLE; stable. Wound vac on RLE c/d/i. LLE dressing c/d/i     Comments: See media tab on 3/9/23   Neurological: He is alert and oriented to person, place, and " time.   Skin: Skin is warm and dry. PICC line site in LUE c/d/i  See media tab on 3/9/23   Psychiatric: linear thought, constricted affect;     Laboratory:  Recent Labs   Lab 03/17/23  0411 03/19/23  0445 03/21/23  0513   WBC 8.46 8.04 8.46   HGB 8.2* 8.3* 8.5*   HCT 26.0* 26.3* 27.5*   * 489* 506*   MCV 84 84 84   RDW 15.9* 15.9* 15.9*   * 137 137   K 4.1 4.2 4.1   CL 98 102 103   CO2 26 25 27   BUN 36* 39* 36*   CREATININE 1.7* 1.8* 1.5*   * 200* 144*   PROT 7.4 7.3 7.2   ALBUMIN 2.0* 2.1* 2.2*   BILITOT 0.1 0.1 0.1   AST 17 21 14   ALKPHOS 52* 49* 46*   ALT 16 19 16     Microbiology Results (last 7 days)       Procedure Component Value Units Date/Time    Blood culture [[id number]] Collected: 03/12/23 0647    Order Status: Completed Specimen: Blood from Antecubital, Left Updated: 03/17/23 1412     Blood Culture, Routine No growth after 5 days.    Blood culture [[id number]] Collected: 03/12/23 0648    Order Status: Completed Specimen: Blood Updated: 03/17/23 1412     Blood Culture, Routine No growth after 5 days.    Blood culture #2 **CANNOT BE ORDERED STAT** [[id number]] Collected: 03/10/23 0608    Order Status: Completed Specimen: Blood Updated: 03/15/23 1612     Blood Culture, Routine No growth after 5 days.    Culture, Anaerobe [[id number]] Collected: 03/10/23 1741    Order Status: Completed Specimen: Wound from Leg, Left Updated: 03/15/23 1103     Anaerobic Culture No anaerobes isolated          I have personally reviewed the above laboratory studies.     Imaging:  EKG (my interpretation): 3/19: A-fib w/ slow ventricular respone w/ competing junc pacemaker    X-Ray Ankle Complete Right    Result Date: 3/9/2023  EXAMINATION: XR ANKLE COMPLETE 3 VIEW RIGHT CLINICAL HISTORY: Unspecified open wound, unspecified lower leg, initial encounter TECHNIQUE: AP, lateral, and oblique images of the right ankle were performed. COMPARISON: Right foot series 02/23/2022 FINDINGS: Chronic appearing deformity of  the mid to distal femoral shaft presumably sequela of remote trauma.  No acute displaced fracture, dislocation or destructive osseous process.  There is DJD. Nonspecific soft tissue swelling about the distal right lower leg, ankle and dorsal hindfoot particular along the anterior and lateral aspect likely representing cellulitis.  No large soft tissue defect or subcutaneous emphysema seen.  Scattered atherosclerotic vascular and also nonspecific soft tissue calcifications noted.  No radiodense retained foreign body.  Small enthesophyte at the Achilles insertion site.     Distal right lower leg, ankle and hindfoot diffuse nonspecific soft tissue swelling from edema or cellulitis with superimposed more prominent soft tissue swelling along the anterolateral aspect of the distal lower leg.  Clinical correlation advised. Additional chronic appearing findings as above. Electronically signed by: Juan Hernandez MD Date:    03/09/2023 Time:    16:51    MRI Ankle Without Contrast Right    Result Date: 3/9/2023  EXAMINATION: MRI ANKLE WITHOUT CONTRAST RIGHT CLINICAL HISTORY: Soft tissue infection suspected, ankle, xray done; TECHNIQUE: MRI ankle performed per routine protocol without contrast. COMPARISON: MRI right lower extremity from 09/01/2006.  MRI the contralateral ankle from 02/19/2021.  Radiographs of the right ankle from 03/09/2023 taken at 1543. FINDINGS: Bone: There is no evidence of acute osteomyelitis.  Marrow signal is normal.  No marrow edema or T1 hypointense signal. Articulations: There is cartilage loss of the joint between the navicular and the cuneiform bones with adjacent endplate edema and cystic change in the distal pole of the navicular.  Remaining articulations are grossly unremarkable.  There is no evidence of a large joint effusion. Tendons: Flexor, extensor, and peroneal tendons are grossly intact and demonstrate normal signal.  There is no evidence of tenosynovitis.  The Achilles tendon is intact.  Ligaments: The anterior inferior and posteroinferior tibiofibular ligaments are intact.  The anterior talofibular ligament is intact.  The calcaneal fibular and posterior talofibular ligaments are intact.  The deltoid and spring ligaments are intact.  The Lisfranc ligament is not entirely included in the field of view. Soft tissues: There is a large dorsal soft tissue ulcer with exposure of the extensor hallucis tendon.  There is circumferential soft tissue edema the ankle extending into the dorsum the hindfoot.     1. No acute osteomyelitis. 2. Large soft tissue ulcer of the dorsal aspect of the hindfoot and ankle with exposure of the extensor hallucis tendon.  Surrounding soft tissue edema and cellulitis as above. Electronically signed by: Zane Galeana Date:    03/09/2023 Time:    19:45       Current Medications:     Scheduled:   apixaban  5 mg Oral BID    ascorbic acid (vitamin C)  500 mg Oral Daily    aspirin  81 mg Oral Daily    atorvastatin  80 mg Oral Daily    cefTRIAXone (ROCEPHIN) IVPB  2 g Intravenous Q24H    clopidogreL  75 mg Oral Daily    gabapentin  200 mg Oral BID    hydrALAZINE  25 mg Oral TID    hydroCHLOROthiazide  25 mg Oral Daily    insulin aspart U-100  15 Units Subcutaneous TID WM    insulin detemir U-100  40 Units Subcutaneous QHS    isosorbide dinitrate  10 mg Oral TID    losartan  100 mg Oral Daily    metroNIDAZOLE  500 mg Oral Q8H    NIFEdipine  60 mg Oral Daily    pantoprazole  40 mg Oral Daily    sodium chloride 0.9%  10 mL Intravenous Q6H    tamsulosin  0.4 mg Oral Daily    vancomycin (VANCOCIN) IVPB  1,250 mg Intravenous Q24H         Infusions:       PRN:  sodium chloride 0.9%, dextrose 10%, dextrose 10%, dextrose, dextrose, glucagon (human recombinant), insulin aspart U-100, naloxone, sodium chloride 0.9%, Flushing PICC Protocol **AND** sodium chloride 0.9% **AND** sodium chloride 0.9%, Pharmacy to dose Vancomycin consult **AND** vancomycin - pharmacy to dose    Antibiotics and Day  Number of Therapy:  Antibiotics (From admission, onward)      Start     Stop Route Frequency Ordered    03/18/23 1300  vancomycin 1,250 mg in dextrose 5 % (D5W) 250 mL IVPB (Vial-Mate)         -- IV Every 24 hours (non-standard times) 03/18/23 0251    03/14/23 2230  cefTRIAXone (ROCEPHIN) 2 g in dextrose 5 % in water (D5W) 5 % 50 mL IVPB (MB+)         -- IV Every 24 hours (non-standard times) 03/14/23 1428    03/13/23 0900  mupirocin 2 % ointment         03/18 0859 Nasl 2 times daily 03/13/23 0543    03/10/23 2200  metroNIDAZOLE tablet 500 mg         -- Oral Every 8 hours 03/10/23 1627    03/10/23 1725  vancomycin - pharmacy to dose  (vancomycin IVPB)        See Yolandece for full Linked Orders Report.    -- IV pharmacy to manage frequency 03/10/23 1627          Assessment:     Saji Castañeda is a 73 y.o. male with a PMHx of chronic LLE osteomyelitis,T2DM c/b neuropathy, PVD, DVT in RUE (June 2022), HTN, CKD, Mobitz Type 1, NSTEMI (9/2022) presenting with worsening BLE wounds that has been getting worse over the past 3 wks. Pt will be admitted to [facility] for the evaluation and management of chronic BLE wounds w/ superimposed cellulitis    Plan:     Chronic BLE wounds w/ superimposed cellulitis  Per Wound Care, pt has new R anterior ankle wounds w/ tendon exposure & a known L plantar foot wound w/ palpable bone in L heel from previous visit. Pt sent to ED for possible IV abx +/- debridement.  Afebrile w/ no elevations in WBC and ANC, however cannot r/o systemic infxn given pt's poor immune status 2/2 long T2DM hx  Consulted ID for abx management/regiment given hx of receiving dapto. per ID:  Pt needed PICC line ([place] 3[place] [facility]  Vanc & Ceftriaxone IV along w/ Flagyl PO for ~6wks (last Vanc Trough 15.6 on 3/15; goal of 15-20)  End of IV abx date: 4/20/23  Weekly outpt labs on Mon or Tues while on IV abx:  CBC, CMP or BMP, ESR, CRP, CPK, and Vanc trough (goal: 15-20)  Appreciate recs  MRI R Ankle showed no acute  osteomyelitis but showed large soft tissue ulcer of the dorsal aspect of the hindfoot and ankle with exposure of the extensor hallucis tendon w/ surrounding soft tissue edema and cellulitis.   BLE U/S showed no evidence of vasc occlusion/stenosis in RLE/LLE arterial system, but does display findings suggestive of vasc ds  MRI L Foot showed changes of osteomyelitis involving the posterior calcaneus where there is a large soft tissue defect.  It is similar to 02/19/2021 examination.  Consulted Podiatry for L heel osteo eval; per Podiatry on 3/16:  Subacute osteomyelitis of left foot  MRI reviewed with osteomyelitis to left heel. Heel wound without acute signs of infection, site with bone exposure. Discussed surgical vs conservative treatment options with amputation vs conservative treatment with 6 weeks of IV antibiotics with no guarantee of resolution.   Patient elects to continue with antibiotics for now. Bone debridement previously performed with cultures taken. Antibiotic management per Infectious Disease. He will require 6 weeks of IV antibiotics.   Continue z-flex boots to both lower extremities, he is NWB to bilateral lower extremity.   Nursing orders in for wound care to left heel, upon d/c home health vs nursing to continue same dressing changes q3x per week. Wound vac change q3x per week, orders to follow. He is to continue follow up at wound clinic with Dr. Farrell  Acute osteomyelitis of right ankle  Ulcer improving today to right anterior ankle, continues to probe to bone, however signs of infection appear to be resolving and increased granular tissue noted. Recommend wound vac application. Orders placed.   Cultures previously taken, recommend 6 weeks of IV antibiotics for osteomyelitis  Appreciate recs  Consulted Surgery for BKA eval; per Surgery:  Dr Almanzar has been discussing w/ pt about BKA however pt has been refusing so far.  Continue IV Abx and local wound care  Defer wound care to   "Pam  Appreciate recs  3/10 Aerobic B-Cx grew Proteus Mirabilis. 3/12 BC x2 NGTD  3/10 Bone Cx showed no anaerobic growth  3/17 Wound vac placed on RLE for bleed and to be changed 2x a wk per Wound Care; stable    T2DM w/ Neuropathy  Last A1c 9.9 about 5 months ago  Home Metformin and Rosuvastatin w/ insulin  POCT glucose checks  A1c 8.4  Currently on Atorvastatin 80mg daily and SSI (insulin aspart 15U TID w/ meals, insulin detemir 40U nightly, and SSI w/ meals and nightly w/ accucheck)  Continue home Gabapentin for neuropathy  Continue home Lasix and Tamsulosin for hx inability to empty bladder 2/2 neuropathy     PAD w/ hx of DVT in RUE  DVT in RUE in June 2022  Continue Eliquis & DAPT    Bradycardia  Pt's HR have been ranging b/n 40s to 60s  3/19 EKG: A-fib w/ slow ventricular respone w/ competing junc pacemaker  3/19 TSH slightly above normal ranges (4.103) w/ Free T4 wnl  Cardiovascular ROS and PE have been unremarkable at this time.  CTM    HTN  BP elevated intermittently only in SBP, likely 2/2 BLE pain that present intermittently per pt  Continue home Hydralazine, Isodil, Losartan, and Nifedipine.  CTM     Hx of Type 1 Mobitz  Avoid BB  Unremarkable ROS and PE suggestive of cardiac process at this time  CTM     Hx of NSTEMI  Hx of NSTEMI in 9/2022  TTE during NSTEMI work-up showed LVEF ~60% w/ notable Pulm HTN (PA systolic pressure 41mmHg)  TTE 3/11 showed LVEF ~70% w/ no other acute/significant changes from previous  Unremarkable ROS and PE suggestive of cardiac process at this time  CTM     Ppx: Apixaban  Diet: Diabetic/Cardiac  Code: Full     Disposition: pending definitive post-discharge plans    Vi Bains MD (Nak)  \[facility] [place] [facility]\"" Internal Medicine, PGY-1    \[facility] [place] [facility]\"" Medicine Hospitalist Pager numbers:   [facility] Hospitalist Medicine Team A (Lorne/Riki): [phone]  [facility] Hospitalist Medicine Team B (Arik/Lucio):  [phone]      "

## 2023-03-21 NOTE — PT/OT/SLP PROGRESS
Occupational Therapy   Treatment    Name: Saji Castañeda  MRN: [id number]  Admitting Diagnosis:  Acute osteomyelitis of right ankle       Recommendations:     Discharge Recommendations: other (see comments) (post acute placement)  Discharge Equipment Recommendations:  other (see comments) (custom wc evaluation, sliding board, drop arm bariatric BSC, TTB)  Barriers to discharge:  Other (Comment), Decreased caregiver support (Increased level of assistance)    Assessment:     Saji Castañeda is a 73 y.o. male with a medical diagnosis of Acute osteomyelitis of right ankle.  Performance deficits affecting function are weakness, impaired endurance, impaired self care skills, impaired functional mobility, gait instability, impaired balance, decreased upper extremity function, decreased lower extremity function, pain, decreased ROM, impaired skin, edema, orthopedic precautions, impaired sensation. Pt found in bed, agreeable to therapy.  Pt requires increased assistance, Max A x 2 for sliding board transfers,  wc>EOB in order to maintain BLE NWB'ing.  Pt tolerated OOB in wc ~2 hours. Rec post acute placement at time of discharge to maximize Leslie and safety with ADL's and functional mobility. Continue OT services to address functional goals, progressing as able.      Rehab Prognosis:  Good; patient would benefit from acute skilled OT services to address these deficits and reach maximum level of function.       Plan:     Patient to be seen 3 x/week to address the above listed problems via self-care/home management, therapeutic activities, therapeutic exercises  Plan of Care Expires: 04/16/23  Plan of Care Reviewed with: patient    Subjective     Pain/Comfort:  Pain Rating 1: 0/10  Pain Rating Post-Intervention 1: 0/10    Objective:     Communicated with: RN prior to session.  Patient found HOB elevated with bed alarm, Condom Catheter, peripheral IV, telemetry, pressure relief boots upon OT entry to room.    General  Precautions: Standard, fall    Orthopedic Precautions:LLE non weight bearing, RLE non weight bearing  Braces: N/A  Respiratory Status: Room air     Occupational Performance:     Bed Mobility:    Patient completed Rolling/Turning to Right with modified independence  Patient completed Scooting/Bridging with modified independence  Patient completed Supine to Sit with modified independence  Patient completed Sit to Supine with modified independence     Functional Mobility/Transfers:  Patient completed Bed >Wheel Chair Transfer using Slide Board technique with Mod A x 1 and Min A x1, and WC(uphill)>EOB with Max A x 2 using sliding board.    [facility] 6 Click ADL: 17    Treatment & Education:  Pt sat EOB at Mod I level.   Pt instructed on effective scooting technique while maintaining BLE NWB in prep for SB transfer. Pt able to laterally scoot at EOB with SBA with bed elevated to avoid wt bearing.   Pt required set up of sliding board and moderate assistance by PTA to maintain BLE NWB during transfer and minimal assist by KATHLEEN to scoot on to SB with vc's for sequencing, occasional assist for proper hand placement. Sliding board removed and pt remained up in w/c.    [facility] Pt required increased assistance to transfer to elevated surface, Max A x 2 and additional assist from PCT to stabilize wc. PT scoots EOB with SBA and sit>supine at Mod I level.       Patient left HOB elevated with all lines intact, call button in reach, bed alarm on, and RN notified    GOALS:   Multidisciplinary Problems       Occupational Therapy Goals          Problem: Occupational Therapy    Goal Priority Disciplines Outcome Interventions   Occupational Therapy Goal     OT, PT/OT Ongoing, Progressing    Description: Goals to be met by: 4/16/2023     Patient will increase functional independence with ADLs by performing:    UE Dressing with Set-up Assistance.  Grooming while seated with Set-up Assistance.  Toileting from drop-arm  bedside commode with Supervision for hygiene and clothing management.   Rolling to Bilateral with Modified [place].   Supine to sit with Modified [place].  Toilet transfer to bedside commode with Minimal Assistance & use of slide board.                         Time Tracking:     OT Date of Treatment: 03/21/23  OT Start Time: 1140 (1355)  OT Stop Time: 1205 (1419)  OT Total Time (min): 25 min/24 min    Billable Minutes:Therapeutic Activity 2/ TA 2            3/21/2023

## 2023-03-22 ENCOUNTER — PATIENT OUTREACH (OUTPATIENT)
Dept: ADMINISTRATIVE | Facility: OTHER | Age: 73
End: 2023-03-22
Payer: MEDICARE

## 2023-03-22 VITALS
HEIGHT: 69 IN | BODY MASS INDEX: 40.85 KG/M2 | DIASTOLIC BLOOD PRESSURE: 63 MMHG | HEART RATE: 59 BPM | TEMPERATURE: 97 F | WEIGHT: 275.81 LBS | SYSTOLIC BLOOD PRESSURE: 163 MMHG | OXYGEN SATURATION: 97 % | RESPIRATION RATE: 17 BRPM

## 2023-03-22 LAB
POCT GLUCOSE: 131 MG/DL (ref 70–110)
POCT GLUCOSE: 171 MG/DL (ref 70–110)

## 2023-03-22 PROCEDURE — 97530 THERAPEUTIC ACTIVITIES: CPT

## 2023-03-22 PROCEDURE — 93010 ELECTROCARDIOGRAM REPORT: CPT | Mod: ,,, | Performed by: INTERNAL MEDICINE

## 2023-03-22 PROCEDURE — 97530 THERAPEUTIC ACTIVITIES: CPT | Mod: CO

## 2023-03-22 PROCEDURE — 93010 EKG 12-LEAD: ICD-10-PCS | Mod: ,,, | Performed by: INTERNAL MEDICINE

## 2023-03-22 PROCEDURE — A4216 STERILE WATER/SALINE, 10 ML: HCPCS | Performed by: STUDENT IN AN ORGANIZED HEALTH CARE EDUCATION/TRAINING PROGRAM

## 2023-03-22 PROCEDURE — 25000003 PHARM REV CODE 250

## 2023-03-22 PROCEDURE — 93005 ELECTROCARDIOGRAM TRACING: CPT

## 2023-03-22 PROCEDURE — 97110 THERAPEUTIC EXERCISES: CPT | Mod: CO

## 2023-03-22 PROCEDURE — 97542 WHEELCHAIR MNGMENT TRAINING: CPT

## 2023-03-22 PROCEDURE — 25000003 PHARM REV CODE 250: Performed by: STUDENT IN AN ORGANIZED HEALTH CARE EDUCATION/TRAINING PROGRAM

## 2023-03-22 PROCEDURE — 99900035 HC TECH TIME PER 15 MIN (STAT)

## 2023-03-22 RX ADMIN — INSULIN ASPART 15 UNITS: 100 INJECTION, SOLUTION INTRAVENOUS; SUBCUTANEOUS at 09:03

## 2023-03-22 RX ADMIN — HYDRALAZINE HYDROCHLORIDE 25 MG: 25 TABLET, FILM COATED ORAL at 09:03

## 2023-03-22 RX ADMIN — HYDROCHLOROTHIAZIDE 25 MG: 25 TABLET ORAL at 09:03

## 2023-03-22 RX ADMIN — ISOSORBIDE DINITRATE 10 MG: 10 TABLET ORAL at 09:03

## 2023-03-22 RX ADMIN — SODIUM CHLORIDE, PRESERVATIVE FREE 10 ML: 5 INJECTION INTRAVENOUS at 06:03

## 2023-03-22 RX ADMIN — METRONIDAZOLE 500 MG: 500 TABLET ORAL at 06:03

## 2023-03-22 RX ADMIN — OXYCODONE HYDROCHLORIDE AND ACETAMINOPHEN 500 MG: 500 TABLET ORAL at 09:03

## 2023-03-22 RX ADMIN — NIFEDIPINE 60 MG: 30 TABLET, FILM COATED, EXTENDED RELEASE ORAL at 09:03

## 2023-03-22 RX ADMIN — TAMSULOSIN HYDROCHLORIDE 0.4 MG: 0.4 CAPSULE ORAL at 09:03

## 2023-03-22 RX ADMIN — APIXABAN 5 MG: 5 TABLET, FILM COATED ORAL at 09:03

## 2023-03-22 RX ADMIN — PANTOPRAZOLE SODIUM 40 MG: 40 TABLET, DELAYED RELEASE ORAL at 09:03

## 2023-03-22 RX ADMIN — INSULIN ASPART 15 UNITS: 100 INJECTION, SOLUTION INTRAVENOUS; SUBCUTANEOUS at 12:03

## 2023-03-22 RX ADMIN — ASPIRIN 81 MG: 81 TABLET, COATED ORAL at 09:03

## 2023-03-22 RX ADMIN — LOSARTAN POTASSIUM 100 MG: 50 TABLET, FILM COATED ORAL at 09:03

## 2023-03-22 RX ADMIN — SODIUM CHLORIDE, PRESERVATIVE FREE 10 ML: 5 INJECTION INTRAVENOUS at 12:03

## 2023-03-22 RX ADMIN — INSULIN ASPART 2 UNITS: 100 INJECTION, SOLUTION INTRAVENOUS; SUBCUTANEOUS at 12:03

## 2023-03-22 RX ADMIN — CLOPIDOGREL BISULFATE 75 MG: 75 TABLET ORAL at 09:03

## 2023-03-22 RX ADMIN — GABAPENTIN 200 MG: 100 CAPSULE ORAL at 09:03

## 2023-03-22 RX ADMIN — ATORVASTATIN CALCIUM 80 MG: 40 TABLET, FILM COATED ORAL at 09:03

## 2023-03-22 NOTE — PLAN OF CARE
Problem: Occupational Therapy  Goal: Occupational Therapy Goal  Outcome: Ongoing, Progressing   Saji Castañeda is a 73 y.o. male with a medical diagnosis of Acute osteomyelitis of right ankle.  Performance deficits affecting function are weakness, impaired endurance, impaired sensation, impaired self care skills, impaired functional mobility, impaired balance, decreased upper extremity function, decreased lower extremity function, decreased safety awareness, pain, impaired skin, edema, orthopedic precautions. Pt found in bed, agreeable to therapy.  Pt required less assist with transfers with wt bearing through R heel only. No complaints of pain. Pt is progressing towards goals. Continue OT services to address functional goals, progressing as able.

## 2023-03-22 NOTE — PROGRESS NOTES
"CM rec'd call from [place]   Paperwork has been completed per pt's sister   She is returning to facility to review orders - will call CM with "ok" to call report and set up transportation this am.   MD informed.   "

## 2023-03-22 NOTE — PLAN OF CARE
"  Problem: Adult Inpatient Plan of Care  Goal: Plan of Care Review  Outcome: Ongoing, Progressing     Problem: Adult Inpatient Plan of Care  Goal: Optimal Comfort and Wellbeing  Outcome: Ongoing, Progressing     Problem: Diabetes Comorbidity  Goal: Blood Glucose Level Within Targeted Range  Outcome: Ongoing, Progressing     Problem: Impaired Wound Healing  Goal: Optimal Wound Healing  Outcome: Ongoing, Progressing     Problem: Skin Injury Risk Increased  Goal: Skin Health and Integrity  Outcome: Ongoing, Progressing     .Plan of care reviewed with the patient. Scheduled medicines given and the patient tolerated well. Fall and safety precautions taken and the standard interventions are in place. On Telemetry monitoring with Bradycardia and second degree AV block, patient HR goed down to high 30s, reported to MD, Dr. Cordon. Also reported on high /72. no true "red alarms' noted, no acute distress reported either in the shift. Patient's Accu Check was 131 mg/dl. Advised the patient to call for the assistance. Continued monitoring the patient.   "

## 2023-03-22 NOTE — PLAN OF CARE
Problem: Physical Therapy  Goal: Physical Therapy Goal  Description: Description: Goals to be met by: 23      Patient will increase functional independence with mobility by performin. Supine to sit with Stand-by Assistance  2. Sit to supine with Stand-by Assistance  3. Rolling to Left and Right with Stand-by Assistance.  4. Bed to chair transfer with Stand-by Assistance using Slideboard  5. Wheelchair propulsion x 80 feet with Modified [place] using bilateral upper extremities  Outcome: Ongoing, Progressing     Pt educated on updated WB precautions to LLE NWB and WB to R heel for transfers only. Pt requires Maile to transfer bed >w/c and ModA x 2 w/c>bed due to elevated surface. Pt propelled w/c short distances with B UE and SBA with VC's for efficiency.

## 2023-03-22 NOTE — PT/OT/SLP PROGRESS
Physical Therapy Treatment    Patient Name:  Saji Castañeda   MRN:  [id number]    Recommendations:     Discharge Recommendations:  (post acute placement)  Discharge Equipment Recommendations: wheelchair (brakes are broken on current w/c)  Barriers to discharge: Decreased caregiver support and increased level of assistance    Assessment:     Saji Castañeda is a 73 y.o. male admitted with a medical diagnosis of Acute osteomyelitis of right ankle.  He presents with the following impairments/functional limitations: gait instability, weakness, impaired endurance, impaired balance, decreased upper extremity function, decreased lower extremity function, decreased ROM, impaired joint extensibility, impaired coordination, orthopedic precautions, impaired skin, impaired functional mobility, edema, impaired self care skills, impaired sensation, decreased safety awareness, decreased coordination, impaired muscle length .Pt educated on updated WB precautions to LLE NWB and WB to R heel for transfers only. Pt requires Maile to transfer bed >w/c and ModA x 2 w/c>bed due to elevated surface. Pt propelled w/c short distances with B UE and SBA with VC's for efficiency.     Rehab Prognosis: Good; patient would benefit from acute skilled PT services to address these deficits and reach maximum level of function.    Recent Surgery: * No surgery found *      Plan:     During this hospitalization, patient to be seen 3 x/week to address the identified rehab impairments via therapeutic activities, therapeutic exercises, neuromuscular re-education, wheelchair management/training and progress toward the following goals:    Plan of Care Expires:  04/16/23    Subjective     Chief Complaint: none  Patient/Family Comments/goals: pt agreeable to therapy  Pain/Comfort:  Pain Rating 1: 0/10  Pain Rating Post-Intervention 1: 0/10      Objective:     Communicated with nsg prior to session.  Patient found HOB elevated with bed alarm, Condom Catheter,  telemetry, peripheral IV, pressure relief boots, wound vac upon PT entry to room.     General Precautions: Standard, fall  Orthopedic Precautions: LLE non weight bearing (per podiatry (Dr. Orozco: pt able to perform R heel WB for transfers only))  Braces: N/A  Respiratory Status: Room air     Functional Mobility:  Bed Mobility:     Rolling Right: modified independence  Scooting: modified independence  Supine to Sit: modified independence and HOB elevated  Sit to Supine: modified independence and HOB elevated  Transfers:     Bed to Chair: Maile x 2 bed > w/c and ModA x 2 w/c > bed due to elevated surface and to assist with LLE NWB  Wheelchair Propulsion:  Pt propelled Standard wheelchair x 20 ft x 5 trials on Level tile and over threshold X 2 trials  with  Bilateral upper extremity with Stand-by Assistance and VC's for efficiency; B hands wrapped with coban for traction due to gloves at home .       AM-PAC 6 CLICK MOBILITY  Turning over in bed (including adjusting bedclothes, sheets and blankets)?: 4  Sitting down on and standing up from a chair with arms (e.g., wheelchair, bedside commode, etc.): 1  Moving from lying on back to sitting on the side of the bed?: 4  Moving to and from a bed to a chair (including a wheelchair)?: 2  Need to walk in hospital room?: 1  Climbing 3-5 steps with a railing?: 1  Basic Mobility Total Score: 13       Treatment & Education:  Pt performed mobility as reported above  Rest breaks taken during w/c propulsion including rest break on outside balcony  Pt educated on pressure relief methods while in wheelchair    Patient left HOB elevated with all lines intact, call button in reach, bed alarm on, and nsg notified..    GOALS:   Multidisciplinary Problems       Physical Therapy Goals          Problem: Physical Therapy    Goal Priority Disciplines Outcome Goal Variances Interventions   Physical Therapy Goal     PT, PT/OT Ongoing, Progressing     Description: Description: Goals to be met by:  23      Patient will increase functional independence with mobility by performin. Supine to sit with Stand-by Assistance  2. Sit to supine with Stand-by Assistance  3. Rolling to Left and Right with Stand-by Assistance.  4. Bed to chair transfer with Stand-by Assistance using Slideboard  5. Wheelchair propulsion x 80 feet with Modified [place] using bilateral upper extremities                 Multidisciplinary Problems (Resolved)          Problem: Physical Therapy    Goal Priority Disciplines Outcome Goal Variances Interventions   Physical Therapy Goal   (Resolved)     PT, PT/OT Met     Description: Goals to be met by: 23     Patient will increase functional independence with mobility by performin. Supine to sit with Stand-by Assistance  2. Sit to supine with Stand-by Assistance  3. Rolling to Left and Right with Stand-by Assistance.  4. Bed to chair transfer with Stand-by Assistance using Slideboard  5. Wheelchair propulsion x 80 feet with Modified [place] using bilateral upper extremities                         Time Tracking:     PT Received On: 23  PT Start Time: 1100     PT Stop Time: 1140  PT Total Time (min): 40 min     Billable Minutes: Therapeutic Activity 25 and Train/Wheelchair Management 15  (overlapped session with OT)    Treatment Type: Treatment  PT/PTA: PT     Number of PTA visits since last PT visit: 0     2023

## 2023-03-22 NOTE — PROGRESS NOTES
Pt planning for d/c- removed wound vac dressing and placed nonadherent dressing to tendon/bone, covered with hydrofera blue/abd pad and secured with Kerlix. Notified nurse.

## 2023-03-22 NOTE — PLAN OF CARE
"Pt for d/c today to [facility]      Per [place] - paperwork done by pt's sister   Nurse to call report to    room 44B  - Nurse Brittney [place]esinos transport set up for "now"  pt is FREDERICK ARVIZU.          03/22/23 1059   Final Note   Assessment Type Final Discharge Note   Anticipated Discharge Disposition SNF  ([facility])   What phone number can be called within the next 1-3 days to see how you are doing after discharge? [phone]   Hospital Resources/Appts/Education Provided Post-Acute resouces added to AVS   Post-Acute Status   Post-Acute Placement Status Set-up Complete/Auth obtained   Discharge Delays None known at this time       "

## 2023-03-22 NOTE — PROGRESS NOTES
"[facility] - Telemetry  Adult Nutrition  Progress Note    SUMMARY       Recommendations    Recommendation:  1. Add Kvng BID and beneprotein TID to promote wound healing.   2. Encourage intake at meals as tolerated.   3. Monitor weight/labs.   4. RD to follow to monitor po intake    Goals:  Pt will tolerate diet with at least 75% intake at meals by RD follow up  Nutrition Goal Status: new  Communication of RD Recs: reviewed with RN    Assessment and Plan  ID  Subacute osteomyelitis of left foot  Contributing Nutrition Diagnosis  Increased energy/protein needs    Related to (etiology):  Wound healing    Signs and Symptoms (as evidenced by):   R ankle. L foot/calf wounds    Interventions:  Collaboration with other providers  Increased protein diet    Nutrition Diagnosis Status:   New      Malnutrition Assessment  Unable to assess NFPE at visit       Reason for Assessment  Reason For Assessment: length of stay  Diagnosis:  (acute osteomyelitis R ankle)  Relevant Medical History: arthritis, DM, HLD, osteomyelitis, HTN, L foot amputation  General Information Comments: Pt on 2000 ADA diet with 75% intake at meals. s/p debridement 3/10. PICC line. Tomas 17- R ankle, L foot, L calf wounds. Weight stable. Unable to assess NFPE. Noted possible d/c tomorrow.  Nutrition Discharge Planning: pt to d/c on ADA diet    Nutrition Risk Screen  Nutrition Risk Screen: no indicators present    Nutrition/Diet History  Food Preferences: no Christian or cultural food prefs identified  Spiritual, Cultural Beliefs, Anabaptist Practices, Values that Affect Care: no  Factors Affecting Nutritional Intake: None identified at this time    Anthropometrics  Temp: 96.5 °F (35.8 °C)  Height Method: Stated  Height: 5' 9" (175.3 cm)  Height (inches): 69 in  Weight Method: Bed Scale  Weight: 125.1 kg (275 lb 12.7 oz)  Weight (lb): 275.8 lb  Ideal Body Weight (IBW), Male: 160 lb  % Ideal Body Weight, Male (lb): 172.38 %  BMI (Calculated): 40.7  BMI Grade: " greater than 40 - morbid obesity     Lab/Procedures/Meds  Pertinent Labs Reviewed: reviewed  Pertinent Labs Comments: BUN 36H, Crea 1.5H, Glu 144H, Alb 2.2L, PAB 17L  Pertinent Medications Reviewed: reviewed  Pertinent Medications Comments: Vit C, aspirin, rocephin, gabapentin, insulin, pantoprazole    Estimated/Assessed Needs  Weight Used For Calorie Calculations: 125.1 kg (275 lb 12.7 oz)  Energy Calorie Requirements (kcal): 2179  Energy Need Method: Luce-St Jeor (x1.1)  Protein Requirements: 100g (0.8g/kg)  Weight Used For Protein Calculations: 125.1 kg (275 lb 12.7 oz)  Estimated Fluid Requirement Method: RDA Method  RDA Method (mL): 2179  CHO Requirement: 225g    Nutrition Prescription Ordered  Current Diet Order: 2000 ADA    Evaluation of Received Nutrient/Fluid Intake  I/O: 0/1050  Energy Calories Required: meeting needs  Protein Required: meeting needs  Fluid Required: meeting needs  Comments: LBM 3/18  % Intake of Estimated Energy Needs: 75 - 100 %  % Meal Intake: 75 - 100 %    Nutrition Risk  Level of Risk/Frequency of Follow-up:  (2xweekly)     Monitor and Evaluation  Food and Nutrient Intake: food and beverage intake  Food and Nutrient Adminstration: diet order  Physical Activity and Function: nutrition-related ADLs and IADLs  Anthropometric Measurements: weight  Biochemical Data, Medical Tests and Procedures: electrolyte and renal panel  Nutrition-Focused Physical Findings: overall appearance     Nutrition Follow-Up    RD Follow-up?: Yes

## 2023-03-22 NOTE — PROGRESS NOTES
IP Liaison - Final Visit Note    Patient: Saji Castañeda  MRN:  [id number]  Date of Service:  3/22/2023  Completed by:  LARON Albert    Reason for Visit   Patient presents with    IP Liaison Chart Review        Patient Summary     Discharge Date: 03/22/2023  Discharge telephone number/address: [phone] / [facility]  Follow up provider: n/a  Follow up appointments: n/a  Home Health agency & telephone number: n/a  DME ordered &  name: n/a  Assigned OPCM RN/SW: n/a  Report sent to follow up team (PCP/OPCM) via in basket message: n/a  Community Resources arranged including agency name & contact info: n/a      LARON Albert

## 2023-03-22 NOTE — PLAN OF CARE
Recommendation:  1. Add Kvng BID and beneprotein TID to promote wound healing.   2. Encourage intake at meals as tolerated.   3. Monitor weight/labs.   4. RD to follow to monitor po intake    Goals:  Pt will tolerate diet with at least 75% intake at meals by RD follow up  Nutrition Goal Status: new

## 2023-03-22 NOTE — PT/OT/SLP PROGRESS
Occupational Therapy   Treatment    Name: Saji Castañeda  MRN: [id number]  Admitting Diagnosis:  Acute osteomyelitis of right ankle       Recommendations:     Discharge Recommendations: other (see comments) (post acute placement)  Discharge Equipment Recommendations:  wheelchair   Barriers to discharge:  Decreased caregiver support, Other (Comment) (Increased level of assistance)    Assessment:     Saji Castañeda is a 73 y.o. male with a medical diagnosis of Acute osteomyelitis of right ankle.  Performance deficits affecting function are weakness, impaired endurance, impaired sensation, impaired self care skills, impaired functional mobility, impaired balance, decreased upper extremity function, decreased lower extremity function, decreased safety awareness, pain, impaired skin, edema, orthopedic precautions. Pt found in bed, agreeable to therapy.  Pt required less assist with transfers with wt bearing through R heel only. No complaints of pain. Pt is progressing towards goals. Continue OT services to address functional goals, progressing as able.      Rehab Prognosis:  Good; patient would benefit from acute skilled OT services to address these deficits and reach maximum level of function.       Plan:     Patient to be seen 3 x/week to address the above listed problems via self-care/home management, therapeutic activities, therapeutic exercises  Plan of Care Expires: 04/16/23  Plan of Care Reviewed with: patient    Subjective     Pain/Comfort:  Pain Rating 1: 0/10  Pain Rating Post-Intervention 1: 0/10    Objective:     Communicated with: RN prior to session.  Patient found HOB elevated with bed alarm, Condom Catheter, peripheral IV, telemetry, pressure relief boots, wound vac upon OT entry to room.    General Precautions: Standard, fall    Orthopedic Precautions:LLE non weight bearing, RLE non weight bearing (per Dr Pam pichardo for R heel wt bear for transfers only)  Braces: N/A  Respiratory Status: Room air      Occupational Performance:     Bed Mobility:    Patient completed Rolling/Turning to Right with modified independence  Patient completed Scooting/Bridging with modified independence  Patient completed Supine to Sit with modified independence, HOB elevated  Patient completed Sit to Supine with modified independence, HOB elevated    Functional Mobility/Transfers:  Patient completed Bed <> Wheelchair Transfer using Scoot Pivot technique with minimum assistance x 2 EOB>WC, and  moderate assistance x 2 persons(to elevated surface) WC>EOB.   Functional Mobility: Pt propelled wc 5 x ~20 feet, before requiring rest break 2/2 muscle fatigue, with SBA and vcs.  Wrapped pts hand with coban for traction as pt reports using gloves at home.     [facility] 6 Click ADL: 17    Treatment & Education:  Pt propelled wc to outside [facility].    Pt educated on pressure relief techs and instructed to perform every 30 min or as needed to prevent sores.      Patient left HOB elevated with all lines intact, call button in reach, bed alarm on, and RN notified    GOALS:   Multidisciplinary Problems       Occupational Therapy Goals       Not on file              Multidisciplinary Problems (Resolved)          Problem: Occupational Therapy    Goal Priority Disciplines Outcome Interventions   Occupational Therapy Goal   (Resolved)     OT, PT/OT Met    Description: Goals to be met by: 4/16/2023     Patient will increase functional independence with ADLs by performing:    UE Dressing with Set-up Assistance.  Grooming while seated with Set-up Assistance.  Toileting from drop-arm bedside commode with Supervision for hygiene and clothing management.   Rolling to Bilateral with Modified [place].   Supine to sit with Modified [place].  Toilet transfer to bedside commode with Minimal Assistance & use of slide board.                         Time Tracking:     OT Date of Treatment: 03/22/23  OT Start Time: 1100  OT Stop Time: 1140  OT Total Time (min):  40 min    Billable Minutes:Therapeutic Activity 25  Therapeutic Exercise 15            3/22/2023

## 2023-03-22 NOTE — ASSESSMENT & PLAN NOTE
Contributing Nutrition Diagnosis  Increased energy/protein needs    Related to (etiology):  Wound healing    Signs and Symptoms (as evidenced by):   R ankle. L foot/calf wounds    Interventions:  Collaboration with other providers  Increased protein diet    Nutrition Diagnosis Status:   New

## 2023-03-24 ENCOUNTER — PATIENT OUTREACH (OUTPATIENT)
Dept: ADMINISTRATIVE | Facility: CLINIC | Age: 73
End: 2023-03-24
Payer: MEDICARE

## 2023-03-28 ENCOUNTER — OUTSIDE PLACE OF SERVICE (OUTPATIENT)
Dept: ADMINISTRATIVE | Facility: OTHER | Age: 73
End: 2023-03-28
Payer: MEDICARE

## 2023-03-28 PROCEDURE — 99305 PR NURSING FACILITY CARE, INIT, MOD SEVERITY: ICD-10-PCS | Mod: ,,, | Performed by: FAMILY MEDICINE

## 2023-03-28 PROCEDURE — 99305 1ST NF CARE MODERATE MDM 35: CPT | Mod: ,,, | Performed by: FAMILY MEDICINE

## 2023-03-29 ENCOUNTER — LAB VISIT (OUTPATIENT)
Dept: LAB | Facility: HOSPITAL | Age: 73
End: 2023-03-29
Attending: FAMILY MEDICINE
Payer: MEDICARE

## 2023-03-29 DIAGNOSIS — Z51.81 ENCOUNTER FOR THERAPEUTIC DRUG MONITORING: Primary | ICD-10-CM

## 2023-03-29 LAB — VANCOMYCIN TROUGH SERPL-MCNC: 18.6 UG/ML (ref 10–22)

## 2023-03-29 PROCEDURE — 36415 COLL VENOUS BLD VENIPUNCTURE: CPT | Mod: PO | Performed by: FAMILY MEDICINE

## 2023-03-29 PROCEDURE — 80202 ASSAY OF VANCOMYCIN: CPT | Performed by: FAMILY MEDICINE

## 2023-04-01 ENCOUNTER — HOSPITAL ENCOUNTER (EMERGENCY)
Facility: HOSPITAL | Age: 73
Discharge: HOME | End: 2023-04-01
Attending: FAMILY MEDICINE
Payer: MEDICARE

## 2023-04-01 VITALS
BODY MASS INDEX: 38.4 KG/M2 | RESPIRATION RATE: 18 BRPM | WEIGHT: 260 LBS | OXYGEN SATURATION: 99 % | SYSTOLIC BLOOD PRESSURE: 153 MMHG | TEMPERATURE: 99 F | DIASTOLIC BLOOD PRESSURE: 67 MMHG | HEART RATE: 64 BPM

## 2023-04-01 DIAGNOSIS — T82.898A OCCLUSION OF PERIPHERALLY INSERTED CENTRAL CATHETER (PICC) LINE, INITIAL ENCOUNTER: Primary | ICD-10-CM

## 2023-04-01 PROCEDURE — 99284 EMERGENCY DEPT VISIT MOD MDM: CPT | Mod: ER

## 2023-04-01 PROCEDURE — 63600175 PHARM REV CODE 636 W HCPCS: Mod: ER | Performed by: PHYSICIAN ASSISTANT

## 2023-04-01 PROCEDURE — 96374 THER/PROPH/DIAG INJ IV PUSH: CPT | Mod: ER

## 2023-04-01 PROCEDURE — 63600175 PHARM REV CODE 636 W HCPCS: Mod: ER | Performed by: FAMILY MEDICINE

## 2023-04-01 RX ORDER — HEPARIN 100 UNIT/ML
1 SYRINGE INTRAVENOUS
Status: COMPLETED | OUTPATIENT
Start: 2023-04-01 | End: 2023-04-01

## 2023-04-01 RX ADMIN — HEPARIN 100 UNITS: 100 SYRINGE at 04:04

## 2023-04-01 RX ADMIN — HEPARIN 100 UNITS: 100 SYRINGE at 03:04

## 2023-04-01 NOTE — ED PROVIDER NOTES
Encounter Date: 4/1/2023       History     Chief Complaint   Patient presents with    Vascular Access Problem     Pt brought in by EMS from [facility] for clogged PICC line. States it became clogged today and was due for anbx's at 2:00 pm.      73-year-old male sent from [facility] for possible blocked PICC line which was placed for IV antibiotics.  He has osteomyelitis and type 2 diabetes mellitus.  Bilateral lower legs and dressing.  Subacute osteomyelitis of left foot Acute osteomyelitis of right ankle- (BLE debridement and bone cxs, as well as the pt receiving a wound vac)  Pt needed PICC line (placed on 3/13 in LUE)  Vanc & Ceftriaxone IV along w/ Flagyl PO for ~6wks (last Vanc Trough 15.6 on 3/15; goal of 15-20)        The history is provided by the patient.   Review of patient's allergies indicates:  No Known Allergies  Past Medical History:   Diagnosis Date    Arthritis     legs    Diabetes mellitus     Diabetes mellitus, type 2     Hyperlipidemia     Hypertension     Osteomyelitis      Past Surgical History:   Procedure Laterality Date    ANGIOGRAPHY OF LOWER EXTREMITY N/A 2/3/2021    Procedure: Angiogram Extremity Bilateral;  Surgeon: Ernst Chacko MD;  Location: [facility] OR [address] [place] [address];  Service: Peripheral Vascular;  Laterality: N/A;  7.4 mintues fluoroscopy time  816.15 mGy  170.17 Gycm2    AORTOGRAPHY WITH EXTREMITY RUNOFF Bilateral 2/3/2021    Procedure: AORTOGRAM, WITH EXTREMITY RUNOFF;  Surgeon: Ernst Chacko MD;  Location: [facility] OR [address] [place] [address];  Service: Peripheral Vascular;  Laterality: Bilateral;    DEBRIDEMENT OF FOOT Left 2/23/2021    Procedure: DEBRIDEMENT, LEFT HEEL;  Surgeon: Mayra Schroeder DPM;  Location: [facility] OR [address] [place] [address];  Service: Podiatry;  Laterality: Left;    FOOT AMPUTATION  October 2010    left high midfoot amputation     Family History   Problem Relation Age of Onset    Diabetes Mother     Heart disease Mother     Stroke Sister     Cancer Brother      Diabetes Sister      Social History     Tobacco Use    Smoking status: Never    Smokeless tobacco: Never   Substance Use Topics    Alcohol use: No     Comment: occassional    Drug use: No     Review of Systems    Physical Exam     Initial Vitals [04/01/23 1502]   BP Pulse Resp Temp SpO2   (!) 146/68 61 18 98.6 °F (37 °C) 100 %      MAP       --         Physical Exam    Nursing note and vitals reviewed.  Constitutional: He appears well-developed and well-nourished.   Eyes: Conjunctivae are normal.   Cardiovascular:  Normal rate.           Pulmonary/Chest: Breath sounds normal. No respiratory distress. He has no wheezes. He has no rales.   Abdominal: Abdomen is soft. Bowel sounds are normal.   Musculoskeletal:         General: Normal range of motion.      Comments: Bilateral lower legs in dressing.    Left arm PICC line in place.  No pain or swelling.  Both lumens flushed and working.       Neurological: He is oriented to person, place, and time. He has normal strength. GCS score is 15. GCS eye subscore is 4. GCS verbal subscore is 5. GCS motor subscore is 6.   Skin: Skin is warm.       ED Course   Procedures  Labs Reviewed - No data to display       Imaging Results    None          Medications   heparin, porcine (PF) 100 unit/mL injection flush 100 Units (has no administration in time range)   heparin, porcine (PF) 100 unit/mL injection flush 100 Units (100 Units Intravenous Given 4/1/23 1539)     Medical Decision Making:   ED Management:  Left arm PICC- both lumens flushed and working.  Patient discharge to [place].  Continue IV antibiotics.                        Clinical Impression:   Final diagnoses:  [T82.898A] Occlusion of peripherally inserted central catheter (PICC) line, initial encounter (Primary)        ED Disposition Condition    Discharge Stable          ED Prescriptions    None       Follow-up Information    None          Avni Baker MD  04/01/23 4725

## 2023-04-26 ENCOUNTER — TELEPHONE (OUTPATIENT)
Dept: WOUND CARE | Facility: HOSPITAL | Age: 73
End: 2023-04-26
Payer: MEDICARE

## 2023-04-26 NOTE — TELEPHONE ENCOUNTER
Nell called pt to confirm appt for tomorrow 4/26/2023 and pt stated that he had an appt at [facility] wound care 4/27/2023 @ 9am, appt for [facility] wound [facility] canceled

## 2023-05-15 ENCOUNTER — HOSPITAL ENCOUNTER (INPATIENT)
Facility: HOSPITAL | Age: 73
LOS: 4 days | Discharge: ANOTHER HOSPITAL | DRG: 623 | End: 2023-05-19
Attending: EMERGENCY MEDICINE | Admitting: STUDENT IN AN ORGANIZED HEALTH CARE EDUCATION/TRAINING PROGRAM
Payer: MEDICARE

## 2023-05-15 DIAGNOSIS — M79.89 LEG SWELLING: ICD-10-CM

## 2023-05-15 DIAGNOSIS — L03.90 WOUND CELLULITIS: ICD-10-CM

## 2023-05-15 DIAGNOSIS — R07.9 CHEST PAIN: ICD-10-CM

## 2023-05-15 DIAGNOSIS — I73.9 PVD (PERIPHERAL VASCULAR DISEASE): ICD-10-CM

## 2023-05-15 DIAGNOSIS — M86.9 OSTEOMYELITIS OF RIGHT LOWER EXTREMITY: ICD-10-CM

## 2023-05-15 DIAGNOSIS — M86.60 CHRONIC OSTEOMYELITIS: Primary | ICD-10-CM

## 2023-05-15 DIAGNOSIS — I73.9 PERIPHERAL VASCULAR DISEASE: ICD-10-CM

## 2023-05-15 PROBLEM — N17.9 AKI (ACUTE KIDNEY INJURY): Status: ACTIVE | Noted: 2018-10-15

## 2023-05-15 PROBLEM — E11.65 HYPERGLYCEMIA DUE TO DIABETES MELLITUS: Status: ACTIVE | Noted: 2023-05-15

## 2023-05-15 LAB
ALBUMIN SERPL BCP-MCNC: 2.7 G/DL (ref 3.5–5.2)
ALP SERPL-CCNC: 73 U/L (ref 55–135)
ALT SERPL W/O P-5'-P-CCNC: 13 U/L (ref 10–44)
ANION GAP SERPL CALC-SCNC: 10 MMOL/L (ref 8–16)
ANION GAP SERPL CALC-SCNC: 8 MMOL/L (ref 8–16)
AST SERPL-CCNC: 14 U/L (ref 10–40)
BACTERIA #/AREA URNS AUTO: ABNORMAL /HPF
BASOPHILS # BLD AUTO: 0.05 K/UL (ref 0–0.2)
BASOPHILS NFR BLD: 0.6 % (ref 0–1.9)
BILIRUB SERPL-MCNC: 0.3 MG/DL (ref 0.1–1)
BILIRUB UR QL STRIP: NEGATIVE
BUN SERPL-MCNC: 34 MG/DL (ref 8–23)
BUN SERPL-MCNC: 41 MG/DL (ref 8–23)
CALCIUM SERPL-MCNC: 9.7 MG/DL (ref 8.7–10.5)
CALCIUM SERPL-MCNC: 9.9 MG/DL (ref 8.7–10.5)
CHLORIDE SERPL-SCNC: 97 MMOL/L (ref 95–110)
CHLORIDE SERPL-SCNC: 98 MMOL/L (ref 95–110)
CLARITY UR REFRACT.AUTO: ABNORMAL
CO2 SERPL-SCNC: 28 MMOL/L (ref 23–29)
CO2 SERPL-SCNC: 31 MMOL/L (ref 23–29)
COLOR UR AUTO: YELLOW
CREAT SERPL-MCNC: 1.6 MG/DL (ref 0.5–1.4)
CREAT SERPL-MCNC: 1.7 MG/DL (ref 0.5–1.4)
CRP SERPL-MCNC: 50.5 MG/L (ref 0–8.2)
DIFFERENTIAL METHOD: ABNORMAL
EOSINOPHIL # BLD AUTO: 0.2 K/UL (ref 0–0.5)
EOSINOPHIL NFR BLD: 1.9 % (ref 0–8)
ERYTHROCYTE [DISTWIDTH] IN BLOOD BY AUTOMATED COUNT: 17.5 % (ref 11.5–14.5)
ERYTHROCYTE [SEDIMENTATION RATE] IN BLOOD BY PHOTOMETRIC METHOD: 105 MM/HR (ref 0–23)
EST. GFR  (NO RACE VARIABLE): 41.8 ML/MIN/1.73 M^2
EST. GFR  (NO RACE VARIABLE): 44.9 ML/MIN/1.73 M^2
GLUCOSE SERPL-MCNC: 368 MG/DL (ref 70–110)
GLUCOSE SERPL-MCNC: 439 MG/DL (ref 70–110)
GLUCOSE UR QL STRIP: ABNORMAL
HCT VFR BLD AUTO: 30.5 % (ref 40–54)
HCV AB SERPL QL IA: NORMAL
HGB BLD-MCNC: 9.2 G/DL (ref 14–18)
HGB UR QL STRIP: ABNORMAL
HIV 1+2 AB+HIV1 P24 AG SERPL QL IA: NORMAL
IMM GRANULOCYTES # BLD AUTO: 0.03 K/UL (ref 0–0.04)
IMM GRANULOCYTES NFR BLD AUTO: 0.4 % (ref 0–0.5)
KETONES UR QL STRIP: NEGATIVE
LACTATE SERPL-SCNC: 1.8 MMOL/L (ref 0.5–2.2)
LEUKOCYTE ESTERASE UR QL STRIP: ABNORMAL
LYMPHOCYTES # BLD AUTO: 1.4 K/UL (ref 1–4.8)
LYMPHOCYTES NFR BLD: 16.3 % (ref 18–48)
MCH RBC QN AUTO: 25.6 PG (ref 27–31)
MCHC RBC AUTO-ENTMCNC: 30.2 G/DL (ref 32–36)
MCV RBC AUTO: 85 FL (ref 82–98)
MICROSCOPIC COMMENT: ABNORMAL
MONOCYTES # BLD AUTO: 0.8 K/UL (ref 0.3–1)
MONOCYTES NFR BLD: 9.7 % (ref 4–15)
NEUTROPHILS # BLD AUTO: 6 K/UL (ref 1.8–7.7)
NEUTROPHILS NFR BLD: 71.1 % (ref 38–73)
NITRITE UR QL STRIP: NEGATIVE
NRBC BLD-RTO: 0 /100 WBC
PH UR STRIP: 6 [PH] (ref 5–8)
PLATELET # BLD AUTO: 406 K/UL (ref 150–450)
PMV BLD AUTO: 11.3 FL (ref 9.2–12.9)
POCT GLUCOSE: 378 MG/DL (ref 70–110)
POCT GLUCOSE: 428 MG/DL (ref 70–110)
POCT GLUCOSE: 461 MG/DL (ref 70–110)
POCT GLUCOSE: >500 MG/DL (ref 70–110)
POTASSIUM SERPL-SCNC: 4.4 MMOL/L (ref 3.5–5.1)
POTASSIUM SERPL-SCNC: 4.5 MMOL/L (ref 3.5–5.1)
PROT SERPL-MCNC: 7.8 G/DL (ref 6–8.4)
PROT UR QL STRIP: ABNORMAL
RBC # BLD AUTO: 3.59 M/UL (ref 4.6–6.2)
RBC #/AREA URNS AUTO: 26 /HPF (ref 0–4)
SODIUM SERPL-SCNC: 136 MMOL/L (ref 136–145)
SODIUM SERPL-SCNC: 136 MMOL/L (ref 136–145)
SODIUM UR-SCNC: 50 MMOL/L (ref 20–250)
SP GR UR STRIP: 1.01 (ref 1–1.03)
SQUAMOUS #/AREA URNS AUTO: 0 /HPF
URN SPEC COLLECT METH UR: ABNORMAL
UUN UR-MCNC: 397 MG/DL (ref 140–1050)
WBC # BLD AUTO: 8.45 K/UL (ref 3.9–12.7)
WBC #/AREA URNS AUTO: >100 /HPF (ref 0–5)
YEAST UR QL AUTO: ABNORMAL

## 2023-05-15 PROCEDURE — 85652 RBC SED RATE AUTOMATED: CPT

## 2023-05-15 PROCEDURE — 63600175 PHARM REV CODE 636 W HCPCS

## 2023-05-15 PROCEDURE — 83605 ASSAY OF LACTIC ACID: CPT

## 2023-05-15 PROCEDURE — 99222 PR INITIAL HOSPITAL CARE,LEVL II: ICD-10-PCS | Mod: GC,,, | Performed by: SURGERY

## 2023-05-15 PROCEDURE — 81001 URINALYSIS AUTO W/SCOPE: CPT

## 2023-05-15 PROCEDURE — 80053 COMPREHEN METABOLIC PANEL: CPT | Performed by: STUDENT IN AN ORGANIZED HEALTH CARE EDUCATION/TRAINING PROGRAM

## 2023-05-15 PROCEDURE — 87186 SC STD MICRODIL/AGAR DIL: CPT

## 2023-05-15 PROCEDURE — 25000003 PHARM REV CODE 250

## 2023-05-15 PROCEDURE — 99222 1ST HOSP IP/OBS MODERATE 55: CPT | Mod: GC,,, | Performed by: SURGERY

## 2023-05-15 PROCEDURE — 96365 THER/PROPH/DIAG IV INF INIT: CPT

## 2023-05-15 PROCEDURE — 87077 CULTURE AEROBIC IDENTIFY: CPT

## 2023-05-15 PROCEDURE — 87088 URINE BACTERIA CULTURE: CPT

## 2023-05-15 PROCEDURE — 86803 HEPATITIS C AB TEST: CPT | Performed by: PHYSICIAN ASSISTANT

## 2023-05-15 PROCEDURE — 99285 EMERGENCY DEPT VISIT HI MDM: CPT | Mod: GC,,, | Performed by: EMERGENCY MEDICINE

## 2023-05-15 PROCEDURE — 84540 ASSAY OF URINE/UREA-N: CPT

## 2023-05-15 PROCEDURE — 85025 COMPLETE CBC W/AUTO DIFF WBC: CPT

## 2023-05-15 PROCEDURE — 84300 ASSAY OF URINE SODIUM: CPT

## 2023-05-15 PROCEDURE — 87040 BLOOD CULTURE FOR BACTERIA: CPT

## 2023-05-15 PROCEDURE — 87086 URINE CULTURE/COLONY COUNT: CPT

## 2023-05-15 PROCEDURE — 96367 TX/PROPH/DG ADDL SEQ IV INF: CPT

## 2023-05-15 PROCEDURE — 99285 EMERGENCY DEPT VISIT HI MDM: CPT | Mod: 25

## 2023-05-15 PROCEDURE — 99285 PR EMERGENCY DEPT VISIT,LEVEL V: ICD-10-PCS | Mod: GC,,, | Performed by: EMERGENCY MEDICINE

## 2023-05-15 PROCEDURE — 99223 1ST HOSP IP/OBS HIGH 75: CPT | Mod: AI,GC,, | Performed by: STUDENT IN AN ORGANIZED HEALTH CARE EDUCATION/TRAINING PROGRAM

## 2023-05-15 PROCEDURE — 86140 C-REACTIVE PROTEIN: CPT | Performed by: STUDENT IN AN ORGANIZED HEALTH CARE EDUCATION/TRAINING PROGRAM

## 2023-05-15 PROCEDURE — 99223 PR INITIAL HOSPITAL CARE,LEVL III: ICD-10-PCS | Mod: AI,GC,, | Performed by: STUDENT IN AN ORGANIZED HEALTH CARE EDUCATION/TRAINING PROGRAM

## 2023-05-15 PROCEDURE — 20600001 HC STEP DOWN PRIVATE ROOM

## 2023-05-15 PROCEDURE — 80048 BASIC METABOLIC PNL TOTAL CA: CPT | Mod: XB

## 2023-05-15 PROCEDURE — 87389 HIV-1 AG W/HIV-1&-2 AB AG IA: CPT | Performed by: PHYSICIAN ASSISTANT

## 2023-05-15 RX ORDER — DEXTROSE 40 %
30 GEL (GRAM) ORAL
Status: DISCONTINUED | OUTPATIENT
Start: 2023-05-15 | End: 2023-05-19 | Stop reason: HOSPADM

## 2023-05-15 RX ORDER — ATORVASTATIN CALCIUM 40 MG/1
80 TABLET, FILM COATED ORAL DAILY
Status: DISCONTINUED | OUTPATIENT
Start: 2023-05-15 | End: 2023-05-19 | Stop reason: HOSPADM

## 2023-05-15 RX ORDER — ISOSORBIDE DINITRATE 10 MG/1
10 TABLET ORAL 3 TIMES DAILY
Status: DISCONTINUED | OUTPATIENT
Start: 2023-05-15 | End: 2023-05-19 | Stop reason: HOSPADM

## 2023-05-15 RX ORDER — GLUCAGON 1 MG
1 KIT INJECTION
Status: DISCONTINUED | OUTPATIENT
Start: 2023-05-15 | End: 2023-05-16

## 2023-05-15 RX ORDER — TAMSULOSIN HYDROCHLORIDE 0.4 MG/1
0.4 CAPSULE ORAL DAILY
Status: DISCONTINUED | OUTPATIENT
Start: 2023-05-15 | End: 2023-05-19 | Stop reason: HOSPADM

## 2023-05-15 RX ORDER — NALOXONE HCL 0.4 MG/ML
0.02 VIAL (ML) INJECTION
Status: DISCONTINUED | OUTPATIENT
Start: 2023-05-15 | End: 2023-05-19 | Stop reason: HOSPADM

## 2023-05-15 RX ORDER — INSULIN ASPART 100 [IU]/ML
1-10 INJECTION, SOLUTION INTRAVENOUS; SUBCUTANEOUS
Status: DISCONTINUED | OUTPATIENT
Start: 2023-05-15 | End: 2023-05-19 | Stop reason: HOSPADM

## 2023-05-15 RX ORDER — SODIUM CHLORIDE 0.9 % (FLUSH) 0.9 %
10 SYRINGE (ML) INJECTION EVERY 12 HOURS PRN
Status: DISCONTINUED | OUTPATIENT
Start: 2023-05-15 | End: 2023-05-19 | Stop reason: HOSPADM

## 2023-05-15 RX ORDER — CLOPIDOGREL BISULFATE 75 MG/1
75 TABLET ORAL DAILY
Status: DISCONTINUED | OUTPATIENT
Start: 2023-05-16 | End: 2023-05-19 | Stop reason: HOSPADM

## 2023-05-15 RX ORDER — NIFEDIPINE 30 MG/1
60 TABLET, EXTENDED RELEASE ORAL DAILY
Status: DISCONTINUED | OUTPATIENT
Start: 2023-05-16 | End: 2023-05-19 | Stop reason: HOSPADM

## 2023-05-15 RX ORDER — INSULIN ASPART 100 [IU]/ML
10 INJECTION, SOLUTION INTRAVENOUS; SUBCUTANEOUS
Status: DISCONTINUED | OUTPATIENT
Start: 2023-05-15 | End: 2023-05-15

## 2023-05-15 RX ORDER — DEXTROSE 40 %
15 GEL (GRAM) ORAL
Status: DISCONTINUED | OUTPATIENT
Start: 2023-05-15 | End: 2023-05-19 | Stop reason: HOSPADM

## 2023-05-15 RX ORDER — ACETAMINOPHEN 500 MG
1000 TABLET ORAL
Status: COMPLETED | OUTPATIENT
Start: 2023-05-15 | End: 2023-05-15

## 2023-05-15 RX ORDER — TALC
6 POWDER (GRAM) TOPICAL NIGHTLY PRN
Status: DISCONTINUED | OUTPATIENT
Start: 2023-05-15 | End: 2023-05-19 | Stop reason: HOSPADM

## 2023-05-15 RX ORDER — DEXTROSE MONOHYDRATE AND SODIUM CHLORIDE 5; .45 G/100ML; G/100ML
INJECTION, SOLUTION INTRAVENOUS CONTINUOUS PRN
Status: DISCONTINUED | OUTPATIENT
Start: 2023-05-15 | End: 2023-05-16

## 2023-05-15 RX ORDER — INSULIN ASPART 100 [IU]/ML
1-10 INJECTION, SOLUTION INTRAVENOUS; SUBCUTANEOUS
Status: DISCONTINUED | OUTPATIENT
Start: 2023-05-15 | End: 2023-05-15

## 2023-05-15 RX ORDER — SODIUM CHLORIDE 9 MG/ML
1000 INJECTION, SOLUTION INTRAVENOUS CONTINUOUS
Status: ACTIVE | OUTPATIENT
Start: 2023-05-15 | End: 2023-05-16

## 2023-05-15 RX ORDER — INSULIN ASPART 100 [IU]/ML
10 INJECTION, SOLUTION INTRAVENOUS; SUBCUTANEOUS ONCE
Status: COMPLETED | OUTPATIENT
Start: 2023-05-15 | End: 2023-05-15

## 2023-05-15 RX ORDER — LOSARTAN POTASSIUM AND HYDROCHLOROTHIAZIDE 25; 100 MG/1; MG/1
1 TABLET ORAL DAILY
Status: DISCONTINUED | OUTPATIENT
Start: 2023-05-15 | End: 2023-05-15

## 2023-05-15 RX ORDER — ASPIRIN 81 MG/1
81 TABLET ORAL DAILY
Status: DISCONTINUED | OUTPATIENT
Start: 2023-05-16 | End: 2023-05-15

## 2023-05-15 RX ORDER — PANTOPRAZOLE SODIUM 40 MG/1
40 TABLET, DELAYED RELEASE ORAL DAILY
Status: DISCONTINUED | OUTPATIENT
Start: 2023-05-15 | End: 2023-05-19 | Stop reason: HOSPADM

## 2023-05-15 RX ORDER — ACETAMINOPHEN 325 MG/1
650 TABLET ORAL EVERY 6 HOURS PRN
Status: DISCONTINUED | OUTPATIENT
Start: 2023-05-15 | End: 2023-05-19 | Stop reason: HOSPADM

## 2023-05-15 RX ORDER — GLUCAGON 1 MG
1 KIT INJECTION
Status: DISCONTINUED | OUTPATIENT
Start: 2023-05-15 | End: 2023-05-19 | Stop reason: HOSPADM

## 2023-05-15 RX ADMIN — ACETAMINOPHEN 650 MG: 325 TABLET ORAL at 09:05

## 2023-05-15 RX ADMIN — APIXABAN 5 MG: 5 TABLET, FILM COATED ORAL at 09:05

## 2023-05-15 RX ADMIN — INSULIN ASPART 10 UNITS: 100 INJECTION, SOLUTION INTRAVENOUS; SUBCUTANEOUS at 02:05

## 2023-05-15 RX ADMIN — PIPERACILLIN SODIUM AND TAZOBACTAM SODIUM 4.5 G: 4; .5 INJECTION, POWDER, FOR SOLUTION INTRAVENOUS at 01:05

## 2023-05-15 RX ADMIN — ATORVASTATIN CALCIUM 80 MG: 40 TABLET, FILM COATED ORAL at 03:05

## 2023-05-15 RX ADMIN — ACETAMINOPHEN 1000 MG: 500 TABLET ORAL at 01:05

## 2023-05-15 RX ADMIN — VANCOMYCIN HYDROCHLORIDE 2000 MG: 500 INJECTION, POWDER, LYOPHILIZED, FOR SOLUTION INTRAVENOUS at 02:05

## 2023-05-15 RX ADMIN — INSULIN ASPART 5 UNITS: 100 INJECTION, SOLUTION INTRAVENOUS; SUBCUTANEOUS at 09:05

## 2023-05-15 RX ADMIN — TAMSULOSIN HYDROCHLORIDE 0.4 MG: 0.4 CAPSULE ORAL at 02:05

## 2023-05-15 RX ADMIN — PANTOPRAZOLE SODIUM 40 MG: 40 TABLET, DELAYED RELEASE ORAL at 02:05

## 2023-05-15 RX ADMIN — ISOSORBIDE DINITRATE 10 MG: 10 TABLET ORAL at 09:05

## 2023-05-15 RX ADMIN — INSULIN HUMAN 0.1 UNITS/KG/HR: 1 INJECTION, SOLUTION INTRAVENOUS at 11:05

## 2023-05-15 NOTE — CONSULTS
[facility] Dept  Vascular Surgery  Consult Note    Inpatient consult to Vascular Surgery  Consult performed by: Manny Darden MD  Consult ordered by: Monica Mathis DO        Subjective:     Chief Complaint/Reason for Admission: bilateral lower leg wounds    History of Present Illness:  73 year old male with history of T2DM c/b neuropathy, chronic bilateral OM s/p left foot amputation, htn, CKD, PVD, DVT of RUE in June 2022, and asymptomatic Mobitz Type 1 AV block presenting for worsening lower extremity wounds. He has had these wound for years, and follows with [facility]. He recently presented 3/2023 for BLE cellulitis, found to have subacute OM of the left foot, and acute OM of the right ankle. He was treated with IV Vanc and Ceftriaxone as well as po flagyl for six weeks, and this was recently completed. During that admission, surgery was consulted for bilateral BKA, which has been discussed several times. He declined    Patient would no longer heal a BKA he would require AKAs he is no longer ambulatory. He has not ambulated in at least a year and a 1/2.       (Not in a hospital admission)      Review of patient's allergies indicates:  No Known Allergies    Past Medical History:   Diagnosis Date    Arthritis     legs    Diabetes mellitus     Diabetes mellitus, type 2     Hyperlipidemia     Hypertension     Osteomyelitis      Past Surgical History:   Procedure Laterality Date    ANGIOGRAPHY OF LOWER EXTREMITY N/A 2/3/2021    Procedure: Angiogram Extremity Bilateral;  Surgeon: Ernst Chacko MD;  Location: [address];  Service: Peripheral Vascular;  Laterality: N/A;  7.4 mintues fluoroscopy time  816.15 mGy  170.17 Gycm2    AORTOGRAPHY WITH EXTREMITY RUNOFF Bilateral 2/3/2021    Procedure: AORTOGRAM, WITH EXTREMITY RUNOFF;  Surgeon: Ernst Chacko MD;  Location: [facility] OR [address] [place] [address];  Service: Peripheral Vascular;  Laterality: Bilateral;    DEBRIDEMENT OF FOOT Left  2/23/2021    Procedure: DEBRIDEMENT, LEFT HEEL;  Surgeon: Mayra Schroeder DPM;  Location: [facility] OR [address] [place] [address];  Service: Podiatry;  Laterality: Left;    FOOT AMPUTATION  October 2010    left high midfoot amputation     Family History       Problem Relation (Age of Onset)    Cancer Brother    Diabetes Mother, Sister    Heart disease Mother    Stroke Sister          Tobacco Use    Smoking status: Never    Smokeless tobacco: Never   Substance and Sexual Activity    Alcohol use: No     Comment: occassional    Drug use: No    Sexual activity: Not Currently     Review of Systems   Constitutional: Negative.    All other systems reviewed and are negative.  Objective:     Vital Signs (Most Recent):  Temp: 98.4 °F (36.9 °C) (05/15/23 1334)  Pulse: (!) 50 (05/15/23 1703)  Resp: 18 (05/15/23 1501)  BP: (!) 168/78 (05/15/23 1703)  SpO2: 96 % (05/15/23 1704) Vital Signs (24h Range):  Temp:  [98.4 °F (36.9 °C)-100 °F (37.8 °C)] 98.4 °F (36.9 °C)  Pulse:  [47-95] 50  Resp:  [18] 18  SpO2:  [96 %-100 %] 96 %  BP: (136-177)/(76-80) 168/78     Weight: 117.9 kg (260 lb)  Body mass index is 38.4 kg/m².      Physical Exam  Vitals and nursing note reviewed.   Constitutional:       Appearance: He is ill-appearing.   Cardiovascular:      Comments: 2+ femoral pulses bilateral   Pulmonary:      Effort: Pulmonary effort is normal.   Abdominal:      General: Abdomen is flat.   Musculoskeletal:         General: Swelling and signs of injury present.      Right lower leg: Edema present.      Left lower leg: Edema present.   Skin:     General: Skin is warm.      Findings: Bruising, lesion and rash present.   Neurological:      Mental Status: He is alert.        Significant Labs:  CBC:   Recent Labs   Lab 05/15/23  1058   WBC 8.45   RBC 3.59*   HGB 9.2*   HCT 30.5*      MCV 85   MCH 25.6*   MCHC 30.2*     CMP:   Recent Labs   Lab 05/15/23  1416   *   CALCIUM 9.7   ALBUMIN 2.7*   PROT 7.8      K 4.5   CO2 28   CL 98    BUN 41*   CREATININE 1.7*   ALKPHOS 73   ALT 13   AST 14   BILITOT 0.3       Significant Diagnostics:  I have reviewed all pertinent imaging results/findings within the past 24 hours.                    Assessment/Plan:     PVD (peripheral vascular disease)  Patient is non ambulatory with bilateral lower extremity wounds, which appear infected. He has had these wounds for a long time at least 6 months.   He has refused amputation in the past.    Only thing we would offer him is an aka. The patient is refusing this.    Please reconsult if he changes his mind            Thank you for your consult. I will sign off. Please contact us if you have any additional questions.    Manny Darden MD  Vascular Surgery  [facility]

## 2023-05-15 NOTE — ASSESSMENT & PLAN NOTE
MARIO 2019, right side 0.9, left side unable to be obtained. 2/2021 bilateral angiography with no targetable intervention. Has not seen vascular surgery outpatient since Jan 2021.     - On asa/plavix at home. Will hold asa, continue plavix and eliquis (see DVT)  - Vasc sx consulted   - F/u MARIO

## 2023-05-15 NOTE — HPI
73 year old male with history of T2DM c/b neuropathy, chronic bilateral OM s/p left foot amputation, htn, CKD, PVD, DVT of RUE in June 2022, and asymptomatic Mobitz Type 1 AV block presenting for worsening lower extremity wounds. He has had these wound for years, and follows with [facility]. He recently presented 3/2023 for BLE cellulitis, found to have subacute OM of the left foot, and acute OM of the right ankle. He was treated with IV Vanc and Ceftriaxone as well as po flagyl for six weeks, and this was recently completed. During that admission, surgery was consulted for bilateral BKA, which has been discussed several times. He declined    Patient would no longer heal a BKA he would require AKAs he is no longer ambulatory. He has not ambulated in at least a year and a 1/2.

## 2023-05-15 NOTE — ASSESSMENT & PLAN NOTE
73 year old male with history of T2DM c/b neuropathy, chronic bilateral OM s/p left foot amputation and recent completion of 6 weeks IV antibiotic therapy, htn, CKD, PVD, and DVT of RULUNA in June 2022 presenting for worsening lower extremity wounds. He has wound care come to his house twice weekly. Today, he noted that his wound wrappings came loose, and he asked his wound care nurses to bring him to the ED. He endorses leg pain, otherwise no signs of systemic infection. On arrival, he is AF, WBC 8.45, lactate WNL. , CRP 50.5. Suspect progression of chronic OM likely due to diabetic nephropathy with poor wound healing due to PVD and PAD.   - Notably, he also required 6 weeks of IV daptomycin in 7/2022, as well as 6 weeks of IV vanc and ceftriaxone 3/2023. Unclear if he ever fully healed after these treatments. Previous biopsies notably positive for proteus, pseudomonas, porphyromonas, prevotella, strep agalactiae, MRSA, and E. cloacae  -Bilateral foot XR on admission with soft tissue defect along the dorsal aspect of the right midfoot, and irregularity and patchy lucency in the calcaneus, talus, and distal tibia of the left foot. MRI bilateral lower extremities pending.  - ID, podiatry, and vascular surgery consulted.     Plan:  - Given hemodynamic stability, will hold abx until MRI in the event he may need a bone biopsy. If fevers or otherwise decompensates, add vanc/cefepime  - F/u consult recommendations   - F/u blood cultures  - Tylenol prn for pain

## 2023-05-15 NOTE — ED NOTES
Pt changed into gown. Personal belongings placed in belongings bag + labeled at bedside. Pt placed on continuous pulse ox and BP cuff to cycle.

## 2023-05-15 NOTE — SUBJECTIVE & OBJECTIVE
"Past Medical History:   Diagnosis Date    Arthritis     legs    Diabetes mellitus     Diabetes mellitus, type 2     Hyperlipidemia     Hypertension     Osteomyelitis        Past Surgical History:   Procedure Laterality Date    ANGIOGRAPHY OF LOWER EXTREMITY N/A 2/3/2021    Procedure: Angiogram Extremity Bilateral;  Surgeon: Ernst Chacko MD;  Location: [facility] OR [address] [place] [address];  Service: Peripheral Vascular;  Laterality: N/A;  7.4 mintues fluoroscopy time  816.15 mGy  170.17 Gycm2    AORTOGRAPHY WITH EXTREMITY RUNOFF Bilateral 2/3/2021    Procedure: AORTOGRAM, WITH EXTREMITY RUNOFF;  Surgeon: Ernst Chacko MD;  Location: [facility] OR [address] [place] [address];  Service: Peripheral Vascular;  Laterality: Bilateral;    DEBRIDEMENT OF FOOT Left 2/23/2021    Procedure: DEBRIDEMENT, LEFT HEEL;  Surgeon: Mayra Schroeder DPM;  Location: [facility] OR [address] [place] [address];  Service: Podiatry;  Laterality: Left;    FOOT AMPUTATION  October 2010    left high midfoot amputation       Review of patient's allergies indicates:  No Known Allergies    No current facility-administered medications on file prior to encounter.     Current Outpatient Medications on File Prior to Encounter   Medication Sig    apixaban (ELIQUIS) 5 mg Tab Take 1 tablet (5 mg total) by mouth 2 (two) times daily.    ascorbic acid, vitamin C, (VITAMIN C) 500 MG tablet Take 500 mg by mouth once daily.    aspirin (ECOTRIN) 81 MG EC tablet Take 81 mg by mouth once daily.    B COMPLEX-VITAMIN B12 tablet Take 1 tablet by mouth once daily.    BD ULTRA-FINE ADITYA PEN NEEDLE 32 gauge x 5/32" Ndle USE FOUR TIMES DAILY WITH MEALS AND NIGHTLY    blood sugar diagnostic (CONTOUR TEST STRIPS) Strp Inject 1 strip into the skin 2 (two) times daily before meals.    clopidogreL (PLAVIX) 75 mg tablet Take 1 tablet (75 mg total) by mouth once daily.    furosemide (LASIX) 40 MG tablet Take 40 mg by mouth every 48 hours.    gabapentin (NEURONTIN) 100 MG capsule Take 2 capsules (200 mg total) by mouth 2 (two) " times daily. (Patient not taking: Reported on 3/9/2023)    glipiZIDE (GLUCOTROL) 10 MG tablet Take 20 mg by mouth 2 (two) times a day.    hydrALAZINE (APRESOLINE) 25 MG tablet Take 1 tablet (25 mg total) by mouth 3 (three) times daily.    insulin aspart U-100 (NOVOLOG) 100 unit/mL (3 mL) InPn pen Inject 20 Units into the skin 3 (three) times daily with meals.    isosorbide dinitrate (ISORDIL) 10 MG tablet Take 1 tablet (10 mg total) by mouth 3 (three) times daily.    Lactobacillus rhamnosus GG (CULTURELLE) 10 billion cell capsule Take 1 capsule by mouth 2 (two) times daily.    LANTUS SOLOSTAR U-100 INSULIN glargine 100 units/mL SubQ pen Inject 45 Units into the skin every evening.    losartan-hydrochlorothiazide 100-25 mg (HYZAAR) 100-25 mg per tablet Take 1 tablet by mouth once daily.    metFORMIN (GLUCOPHAGE-XR) 500 MG ER 24hr tablet Take 1,000 mg by mouth 2 (two) times a day.    MICROLET LANCET Misc     NIFEdipine (ADALAT CC) 60 MG TbSR Take 60 mg by mouth once daily.    pantoprazole (PROTONIX) 40 MG tablet Take 40 mg by mouth once daily.    rosuvastatin (CRESTOR) 40 MG Tab Take 40 mg by mouth once daily.    tamsulosin (FLOMAX) 0.4 mg Cap Take 0.4 mg by mouth once daily.    [DISCONTINUED] hydroCHLOROthiazide (HYDRODIURIL) 25 MG tablet Take 0.5 tablets (12.5 mg total) by mouth every Mon, Wed, Fri. Beginning on 7/4/2022     Family History       Problem Relation (Age of Onset)    Cancer Brother    Diabetes Mother, Sister    Heart disease Mother    Stroke Sister          Tobacco Use    Smoking status: Never    Smokeless tobacco: Never   Substance and Sexual Activity    Alcohol use: No     Comment: occassional    Drug use: No    Sexual activity: Not Currently     Review of Systems   Constitutional:  Negative for activity change, appetite change, chills, fatigue and fever.   HENT:  Negative for congestion.    Eyes:  Negative for visual disturbance.   Respiratory:  Negative for chest tightness, shortness of breath and  wheezing.    Cardiovascular:  Negative for chest pain and palpitations.   Gastrointestinal:  Positive for diarrhea. Negative for abdominal pain, constipation, rectal pain and vomiting.   Genitourinary:  Positive for frequency. Negative for difficulty urinating.   Musculoskeletal:  Positive for gait problem. Negative for back pain.   Skin:  Positive for wound.   Neurological:  Negative for dizziness, light-headedness and headaches.   Hematological:  Does not bruise/bleed easily.   Psychiatric/Behavioral:  Negative for confusion.    Objective:     Vital Signs (Most Recent):  Temp: 98.4 °F (36.9 °C) (05/15/23 1334)  Pulse: 67 (05/15/23 1501)  Resp: 18 (05/15/23 1501)  BP: (!) 177/76 (05/15/23 1501)  SpO2: 98 % (05/15/23 1501) Vital Signs (24h Range):  Temp:  [98.4 °F (36.9 °C)-100 °F (37.8 °C)] 98.4 °F (36.9 °C)  Pulse:  [67-95] 67  Resp:  [18] 18  SpO2:  [97 %-100 %] 98 %  BP: (136-177)/(76-80) 177/76     Weight: 117.9 kg (260 lb)  Body mass index is 38.4 kg/m².     Physical Exam  Vitals and nursing note reviewed.   Constitutional:       General: He is not in acute distress.     Appearance: Normal appearance. He is ill-appearing.   HENT:      Head: Normocephalic and atraumatic.   Eyes:      General: No scleral icterus.  Cardiovascular:      Rate and Rhythm: Normal rate and regular rhythm.      Heart sounds: Normal heart sounds. No murmur heard.  Pulmonary:      Effort: Pulmonary effort is normal. No respiratory distress.      Breath sounds: Normal breath sounds. No wheezing or rales.   Abdominal:      General: Abdomen is flat. There is no distension.      Palpations: Abdomen is soft.      Tenderness: There is no abdominal tenderness.   Musculoskeletal:         General: Normal range of motion.      Cervical back: Normal range of motion.      Right lower leg: No edema.      Left lower leg: No edema.   Skin:     Findings: Lesion present.      Comments: Left lower extremity with amputated left foot, stump is dry and  intact  Diffuse, cracked erythema along the left leg  Ulcerated lesion with exposed fat and tendon on anterior aspect of right ankle   Right foot cool to touch, pulses unable to be palpated    Neurological:      General: No focal deficit present.      Mental Status: He is alert. Mental status is at baseline.   Psychiatric:         Mood and Affect: Mood normal.         Behavior: Behavior normal.              Significant Labs: All pertinent labs within the past 24 hours have been reviewed.    Significant Imaging: I have reviewed all pertinent imaging results/findings within the past 24 hours.

## 2023-05-15 NOTE — ASSESSMENT & PLAN NOTE
Home regimen: hydralazine 25mg TID, isordil 10mg TID, nifedipine 60 daily, hyzaar 100-25 daily     - restart nifedipine, add other meds as appropriate   - hold hyzaar in setting of YNES

## 2023-05-15 NOTE — ASSESSMENT & PLAN NOTE
Patient admitted with significant hyperglycemia. Most recent fingerstick glucose reviewed-   Recent Labs   Lab 05/15/23  1441 05/15/23  1616   POCTGLUCOSE 461* >500*     On admission: HCO3 28, AG 10, Na 136 (corrected to 142), lactate 1.8. Not concerned for DKA given lack of acidosis, more consistent with HHS.    - Insulin gtt with IV NS  - Hourly glucose checks  - BMP q4 hrs, replete K prn  - F/u UA  - transition to subq insulin when glucose < 200

## 2023-05-15 NOTE — ED PROVIDER NOTES
Encounter Date: 5/15/2023       History     Chief Complaint   Patient presents with    Wound Check     Left and right foot wound. Coming from home. Was supposed to go see wound care today, has missed past 5 wound care apps. EMS reports foul odor, redness, and swelling.      73-year-old male with history of diabetes, hypertension, osteomyelitis is presenting to the emergency department complaining acute on chronic foul smelling bilateral feet wounds.  The patient states that he completed 6 weeks of antibiotics through a PICC line.  States he finished these several weeks ago.  He also reports that he has been receiving wound care at home, despite what triage note reports, last visit was 3 days prior.  It is unclear patient has had fevers at home.  He denies any cough, congestion, chest pain, abdominal pain, nausea, vomiting, diarrhea.    The history is provided by the patient. No  was used.   Review of patient's allergies indicates:  No Known Allergies  Past Medical History:   Diagnosis Date    Arthritis     legs    Diabetes mellitus     Diabetes mellitus, type 2     Hyperlipidemia     Hypertension     Osteomyelitis      Past Surgical History:   Procedure Laterality Date    ANGIOGRAPHY OF LOWER EXTREMITY N/A 2/3/2021    Procedure: Angiogram Extremity Bilateral;  Surgeon: Ernst Chacko MD;  Location: [facility] OR [address] [place] [address];  Service: Peripheral Vascular;  Laterality: N/A;  7.4 mintues fluoroscopy time  816.15 mGy  170.17 Gycm2    AORTOGRAPHY WITH EXTREMITY RUNOFF Bilateral 2/3/2021    Procedure: AORTOGRAM, WITH EXTREMITY RUNOFF;  Surgeon: Ernst Chacko MD;  Location: [facility] OR [address] [place] [address];  Service: Peripheral Vascular;  Laterality: Bilateral;    DEBRIDEMENT OF FOOT Left 2/23/2021    Procedure: DEBRIDEMENT, LEFT HEEL;  Surgeon: Mayra Schroeder DPM;  Location: [facility] OR [address] [place] [address];  Service: Podiatry;  Laterality: Left;    FOOT AMPUTATION  October 2010    left high midfoot amputation     Family  History   Problem Relation Age of Onset    Diabetes Mother     Heart disease Mother     Stroke Sister     Cancer Brother     Diabetes Sister      Social History     Tobacco Use    Smoking status: Never    Smokeless tobacco: Never   Substance Use Topics    Alcohol use: No     Comment: occassional    Drug use: No     Review of Systems   Constitutional:         See HPI     Physical Exam     Initial Vitals [05/15/23 0936]   BP Pulse Resp Temp SpO2   136/80 95 18 100 °F (37.8 °C) 97 %      MAP       --         Physical Exam    Nursing note and vitals reviewed.  Constitutional: He is not diaphoretic. No distress.   Disheveled, malodorous   HENT:   Head: Normocephalic and atraumatic.   Eyes: Conjunctivae and EOM are normal.   Neck: Neck supple.   Normal range of motion.  Cardiovascular:  Normal rate, regular rhythm, normal heart sounds and intact distal pulses.           Pulmonary/Chest: Breath sounds normal. No respiratory distress. He has no wheezes. He has no rhonchi. He has no rales.   Abdominal: Abdomen is soft. Bowel sounds are normal. He exhibits no distension. There is no abdominal tenderness.   Patient's pants saturated with urine There is no rebound and no guarding.   Musculoskeletal:         General: Tenderness present. Normal range of motion.      Cervical back: Normal range of motion and neck supple.      Comments: Left foot amputation.  See image below.  Right anterior distal shin with approximately 2 cm in diameter area of granulation tissue.  No significant warmth in lower extremities.  No crepitus.     Neurological: He is alert. He has normal strength.   Skin: Skin is warm.   Psychiatric: He has a normal mood and affect. Thought content normal.                ED Course   Procedures  Labs Reviewed   CBC W/ AUTO DIFFERENTIAL - Abnormal; Notable for the following components:       Result Value    RBC 3.59 (*)     Hemoglobin 9.2 (*)     Hematocrit 30.5 (*)     MCH 25.6 (*)     MCHC 30.2 (*)     RDW 17.5 (*)      Lymph % 16.3 (*)     All other components within normal limits   SEDIMENTATION RATE - Abnormal; Notable for the following components:    Sed Rate 105 (*)     All other components within normal limits   COMPREHENSIVE METABOLIC PANEL - Abnormal; Notable for the following components:    Glucose 439 (*)     BUN 41 (*)     Creatinine 1.7 (*)     Albumin 2.7 (*)     eGFR 41.8 (*)     All other components within normal limits   C-REACTIVE PROTEIN - Abnormal; Notable for the following components:    CRP 50.5 (*)     All other components within normal limits   URINALYSIS, REFLEX TO URINE CULTURE - Abnormal; Notable for the following components:    Appearance, UA Hazy (*)     Protein, UA Trace (*)     Glucose, UA 4+ (*)     Occult Blood UA Trace (*)     Leukocytes, UA 3+ (*)     All other components within normal limits    Narrative:     Specimen Source->Urine   URINALYSIS MICROSCOPIC - Abnormal; Notable for the following components:    RBC, UA 26 (*)     WBC, UA >100 (*)     Bacteria Few (*)     All other components within normal limits    Narrative:     Specimen Source->Urine   POCT GLUCOSE - Abnormal; Notable for the following components:    POCT Glucose 461 (*)     All other components within normal limits   POCT GLUCOSE - Abnormal; Notable for the following components:    POCT Glucose >500 (*)     All other components within normal limits   POCT GLUCOSE - Abnormal; Notable for the following components:    POCT Glucose 378 (*)     All other components within normal limits   HIV 1 / 2 ANTIBODY    Narrative:     Release to patient->Immediate   HEPATITIS C ANTIBODY    Narrative:     Release to patient->Immediate   LACTIC ACID, PLASMA   UREA NITROGEN, URINE, RANDOM    Narrative:     Specimen Source->Urine   SODIUM, URINE, RANDOM    Narrative:     Specimen Source->Urine          Imaging Results              US Retroperitoneal Complete (Final result)  Result time 05/16/23 06:07:35      Final result by Ish Haro MD  (05/16/23 06:07:35)                   Impression:      Slightly elevated renal arterial resistive indices, nonspecific, but can be seen in medical renal disease.    No hydronephrosis.    Electronically signed by resident: Seamus Fernandes  Date:    05/16/2023  Time:    02:57    Electronically signed by: Ish Haro  Date:    05/16/2023  Time:    06:07               Narrative:    EXAMINATION:  US RETROPERITONEAL COMPLETE    CLINICAL HISTORY:  YNES;    TECHNIQUE:  Ultrasound of the kidneys and urinary bladder was performed including color flow and Doppler evaluation of the kidneys.    COMPARISON:  CTA runoff 06/16/2022    FINDINGS:  Right kidney: The right kidney measures 10.3 cm.  No cortical thinning.  No loss of corticomedullary distinction.  Resistive index measures 0.73.  Decreased perfusion.  No solid mass. No shadowing renal stone. No hydronephrosis.    Left kidney: The left kidney measures 12.4 cm.  No cortical thinning. No loss of corticomedullary distinction.   Resistive index measures 0.76.  Decreased perfusion.  Simple cyst within the upper pole measuring 2.1 x 1.8 x 1.3 cm.  No solid mass. No shadowing renal stone. No hydronephrosis.    The bladder is distended without significant abnormality.    Splenic resistive index: 0.65.                                       X-Ray Foot Complete Bilateral (Final result)  Result time 05/15/23 12:14:57   Procedure changed from X-Ray Foot Complete Left     Final result by Max Pena MD (05/15/23 12:14:57)                   Impression:      Abnormal findings involving both feet as described in the body of the report.      Electronically signed by: Max Pena MD  Date:    05/15/2023  Time:    12:14               Narrative:    EXAMINATION:  XR FOOT COMPLETE 3 VIEW BILATERAL    CLINICAL HISTORY:  wound cellulitis; Cellulitis, unspecified    TECHNIQUE:  AP, lateral, and oblique views of both feet were performed.    COMPARISON:  12/08/2020 and  12/04/2019.    FINDINGS:  Right foot: Bones are mildly demineralized and there are moderate to advanced degenerative changes throughout the foot.  There is a possible soft tissue defect along the dorsal aspect of the midfoot, suggest correlation with direct visualization.  Chronic changes in the right 5th metatarsal.  No acute displaced fracture.  No dislocation.  Small plantar calcaneal spur.  Advanced atherosclerotic vascular calcifications.    Left foot: Exam quality is limited by positioning and motion blur.  Postoperative changes of partial foot amputation at the level of the midfoot.  Irregularity and patchy lucency in the calcaneus, talus, and distal tibia.  There is soft tissue gas adjacent to the calcaneus with probable soft tissue wounds along the stump.  Underlying soft tissue infection and osteomyelitis cannot be excluded.  There is diffuse soft tissue edema and advanced atherosclerotic vascular calcifications.                                       Medications   sodium chloride 0.9% flush 10 mL (has no administration in time range)   naloxone 0.4 mg/mL injection 0.02 mg (has no administration in time range)   melatonin tablet 6 mg (has no administration in time range)   apixaban tablet 5 mg (5 mg Oral Given 5/16/23 1104)   atorvastatin tablet 80 mg (80 mg Oral Given 5/16/23 1104)   tamsulosin 24 hr capsule 0.4 mg (0.4 mg Oral Given 5/16/23 1103)   pantoprazole EC tablet 40 mg (40 mg Oral Given 5/16/23 1104)   clopidogreL tablet 75 mg (75 mg Oral Given 5/16/23 1103)   NIFEdipine 24 hr tablet 60 mg (60 mg Oral Given 5/16/23 1103)   isosorbide dinitrate tablet 10 mg (10 mg Oral Given 5/16/23 1559)   dextrose 40 % gel 15,000 mg (has no administration in time range)   dextrose 40 % gel 30,000 mg (has no administration in time range)   dextrose 10% bolus 125 mL 125 mL (has no administration in time range)   dextrose 10% bolus 250 mL 250 mL (has no administration in time range)   glucagon (human recombinant)  injection 1 mg (has no administration in time range)   insulin aspart U-100 pen 1-10 Units (10 Units Subcutaneous Given 5/16/23 1801)   acetaminophen tablet 650 mg (650 mg Oral Given 5/15/23 2101)   0.9%  NaCl infusion (1,000 mLs Intravenous New Bag 5/16/23 0010)   insulin detemir U-100 (Levemir) pen 30 Units (has no administration in time range)   mupirocin 2 % ointment ( Nasal Given 5/16/23 1114)   meropenem (MERREM) 1 g in sodium chloride 0.9 % 100 mL IVPB (MB+) (1 g Intravenous New Bag 5/16/23 1414)   oxyCODONE immediate release tablet 5 mg (5 mg Oral Given 5/16/23 1752)   collagenase ointment (has no administration in time range)   piperacillin-tazobactam (ZOSYN) 4.5 g in dextrose 5 % in water (D5W) 5 % 100 mL IVPB (MB+) (0 g Intravenous Stopped 5/15/23 1351)   vancomycin 2 g in dextrose 5 % 500 mL IVPB (0 mg Intravenous Stopped 5/15/23 1638)   acetaminophen tablet 1,000 mg (1,000 mg Oral Given 5/15/23 1319)   insulin aspart U-100 pen 10 Units (10 Units Subcutaneous Given 5/15/23 1452)   gadobutroL (GADAVIST) injection 10 mL (10 mLs Intravenous Given 5/16/23 1308)     Medical Decision Making:   Initial Assessment:   73-year-old male with history of diabetes, hypertension, osteomyelitis is presenting to the emergency department complaining acute on chronic foul smelling bilateral feet wounds.  He presents with low-grade fever, otherwise hemodynamically stable.  Differential Diagnosis:   Osteomyelitis, cellulitis, neuropathy, abscess  Clinical Tests:   Lab Tests: Ordered and Reviewed  Radiological Study: Ordered and Reviewed  ED Management:  Elevated inflammatory markers.  X-ray concerning for gas.  MRI to evaluate for osteomyelitis pending at the time of sign-out.  MRI to be followed by inpatient team.  Consult to Podiatry has been placed.  Patient will be admitted to the hospital for continuation of broad-spectrum antibiotics which were initiated in the emergency department.          Attending Attestation:    Physician Attestation Statement for Resident:  As the supervising MD   Physician Attestation Statement: I have personally seen and examined this patient.   I agree with the above history.  -:   As the supervising MD I agree with the above PE.     As the supervising MD I agree with the above treatment, course, plan, and disposition.    I have reviewed and agree with the residents interpretation of the following: lab data, x-rays and CT scans.             Attending ED Notes:   STAFF ATTENDING PHYSICIAN NOTE:  I have individually/jointly evaluated Saji Castañeda and discussed their ED management with the resident physician. I have also reviewed their notes, assessments, and procedures documented.  I was present during all critical portions of any procedure(s) performed on Saji Castañeda.   ____________________  Toño Belcher MD, [facility]  Emergency Medicine Staff      ED Course as of 05/16/23 1955   Mon May 15, 2023   1151 WBC: 8.45  No leukocytosis. [KL]   1151 Hemoglobin(!): 9.2  Improved compared to baseline. [KL]   1231 Lactate, Ten: 1.8 [KL]      ED Course User Index  [KL] Karlee Thomas MD                 Clinical Impression:   Final diagnoses:  [L03.90] Wound cellulitis        ED Disposition Condition    Admit                 Karlee Thomas MD  Resident  05/15/23 1514       Adithya Belcher MD  05/16/23 1956

## 2023-05-15 NOTE — SUBJECTIVE & OBJECTIVE
(Not in a hospital admission)      Review of patient's allergies indicates:  No Known Allergies    Past Medical History:   Diagnosis Date    Arthritis     legs    Diabetes mellitus     Diabetes mellitus, type 2     Hyperlipidemia     Hypertension     Osteomyelitis      Past Surgical History:   Procedure Laterality Date    ANGIOGRAPHY OF LOWER EXTREMITY N/A 2/3/2021    Procedure: Angiogram Extremity Bilateral;  Surgeon: Ernst Chacko MD;  Location: [facility] OR [address] [place] [address];  Service: Peripheral Vascular;  Laterality: N/A;  7.4 mintues fluoroscopy time  816.15 mGy  170.17 Gycm2    AORTOGRAPHY WITH EXTREMITY RUNOFF Bilateral 2/3/2021    Procedure: AORTOGRAM, WITH EXTREMITY RUNOFF;  Surgeon: Ernst Chacko MD;  [facility];  Service: Peripheral Vascular;  Laterality: Bilateral;    DEBRIDEMENT OF FOOT Left 2/23/2021    Procedure: DEBRIDEMENT, LEFT HEEL;  Surgeon: Mayra Schroeder DPM;  Location: [facility] OR [address] [place] [address];  Service: Podiatry;  Laterality: Left;    FOOT AMPUTATION  October 2010    left high midfoot amputation     Family History       Problem Relation (Age of Onset)    Cancer Brother    Diabetes Mother, Sister    Heart disease Mother    Stroke Sister          Tobacco Use    Smoking status: Never    Smokeless tobacco: Never   Substance and Sexual Activity    Alcohol use: No     Comment: occassional    Drug use: No    Sexual activity: Not Currently     Review of Systems   Constitutional: Negative.    All other systems reviewed and are negative.  Objective:     Vital Signs (Most Recent):  Temp: 98.4 °F (36.9 °C) (05/15/23 1334)  Pulse: (!) 50 (05/15/23 1703)  Resp: 18 (05/15/23 1501)  BP: (!) 168/78 (05/15/23 1703)  SpO2: 96 % (05/15/23 1704) Vital Signs (24h Range):  Temp:  [98.4 °F (36.9 °C)-100 °F (37.8 °C)] 98.4 °F (36.9 °C)  Pulse:  [47-95] 50  Resp:  [18] 18  SpO2:  [96 %-100 %] 96 %  BP: (136-177)/(76-80) 168/78     Weight: 117.9 kg (260 lb)  Body mass index is 38.4 kg/m².      Physical  Exam  Vitals and nursing note reviewed.   Constitutional:       Appearance: He is ill-appearing.   Cardiovascular:      Comments: 2+ femoral pulses bilateral   Pulmonary:      Effort: Pulmonary effort is normal.   Abdominal:      General: Abdomen is flat.   Musculoskeletal:         General: Swelling and signs of injury present.      Right lower leg: Edema present.      Left lower leg: Edema present.   Skin:     General: Skin is warm.      Findings: Bruising, lesion and rash present.   Neurological:      Mental Status: He is alert.        Significant Labs:  CBC:   Recent Labs   Lab 05/15/23  1058   WBC 8.45   RBC 3.59*   HGB 9.2*   HCT 30.5*      MCV 85   MCH 25.6*   MCHC 30.2*     CMP:   Recent Labs   Lab 05/15/23  1416   *   CALCIUM 9.7   ALBUMIN 2.7*   PROT 7.8      K 4.5   CO2 28   CL 98   BUN 41*   CREATININE 1.7*   ALKPHOS 73   ALT 13   AST 14   BILITOT 0.3       Significant Diagnostics:  I have reviewed all pertinent imaging results/findings within the past 24 hours.

## 2023-05-15 NOTE — ED NOTES
Pt presents to ED via EMS from home w/ c/o bilateral lower extremity wounds. Pt presents to ED w/ bilateral lower extremity dressings; dressings saturated w/ yellow-green + malodorous drainage. Pt states he has a home wound care nurse -- states he saw her on Friday. Per EMS, pt has missed five consecutive wound care appointments. Pt reports receiving ABX through PICC line recently; PICC line not in place upon arrival to ED. Denies any fever, chills, cough, congestion, chest pain, abdominal pain, nausea, vomiting, diarrhea.    Patient identifiers for Saji Castañeda 73 y.o. male checked and correct.  Chief Complaint   Patient presents with    Wound Check     Left and right foot wound. Coming from home. Was supposed to go see wound care today, has missed past 5 wound care apps. EMS reports foul odor, redness, and swelling.      Past Medical History:   Diagnosis Date    Arthritis     legs    Diabetes mellitus     Diabetes mellitus, type 2     Hyperlipidemia     Hypertension     Osteomyelitis

## 2023-05-15 NOTE — ASSESSMENT & PLAN NOTE
Patient is non ambulatory with bilateral lower extremity wounds, which appear infected. He has had these wounds for a long time at least 6 months.   He has refused amputation in the past.    Only thing we would offer him is an aka. The patient is refusing this.    Please reconsult if he changes his mind    Manny Darden MD PGY7  Vascular Surgery Fellow  [phone]

## 2023-05-15 NOTE — ASSESSMENT & PLAN NOTE
Creatinine 1.7 on admit, recent baseline around 1.6 since March 2023, however was normal previous to this. Will treat as YNES, though could be progression of CKD    Plan:   Lab Results   Component Value Date    CREATININE 1.7 (H) 05/15/2023     - UA, urine lytes, RP US  - Avoid nephrotoxic agents such as NSAIDs, gadolinium and IV radiocontrast.  - Renally dose meds to current GFR.  - Maintain MAP > 65.

## 2023-05-15 NOTE — HPI
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 He has had these wound for years, and follows with [facility]. He recently presented 3/2023 for BLE cellulitis, found to have subacute OM of the left foot, and acute OM of the right ankle. He was treated with IV Vanc and Ceftriaxone as well as po flagyl for six weeks, and this was recently completed. During that admission, surgery was consulted for bilateral BKA, which has been discussed several times. He declined.   Wound care comes to his house where he lives with three roommates twice a week, every Monday and Friday. He feels that the wounds are not being adequately wrapped. Today he comes in because the wound wrappings have come loose and he asked his wound care nurses to bring him to the hospital. He does not notice change in appearance to the wounds. There is constant drainage, however has not increased recently. He otherwise does not care for his wounds. He has a shuttle service take him to wound care appointments, however he has had difficulty with transportation recently, thus causing him to miss appointments. He states he can stand on his legs "a little"; he can walk to his front door with a walker. One episode of diarrhea last week, otherwise has been well and without fevers. He has never smoked, does not drink alcohol, does not utilize illicit drugs.     On arrival to [facility], he has a temp of 100, HR 95, /80, on RA. WBC 8.45, Hgb 9.2 (at baseline), plts 406. CMP pending. . CRP pending. Lactate 1.8. Foot XR wtih soft tissue defect along the dorsal aspect of the right midfoot, and irregularity and patchy lucency in the calcaneus, talus, and distal tibia of the left foot. MRI bilateral lower extremities pending. ID, podiatry, and vascular surgery consulted. " He has been admitted to hospital medicine for management of bilateral LE OM.

## 2023-05-15 NOTE — ASSESSMENT & PLAN NOTE
Patient's FSGs are uncontrolled due to hyperglycemia on current medication regimen.  Last A1c reviewed-   Lab Results   Component Value Date    HGBA1C 8.4 (H) 03/10/2023     Most recent fingerstick glucose reviewed-   Recent Labs   Lab 05/15/23  1441 05/15/23  1616   POCTGLUCOSE 461* >500*     Current correctional scale  Medium  Titrate anti-hyperglycemic dose as follows-   Antihyperglycemics (From admission, onward)    Start     Stop Route Frequency Ordered    05/15/23 1745  insulin regular bolus from bag/infusion 11.79 Units 11.79 mL         -- IV Once 05/15/23 1650    05/15/23 1745  insulin regular in 0.9 % NaCl 100 unit/100 mL (1 unit/mL) infusion        Question Answer Comment   Insulin Rate Adjustment (DO NOT MODIFY ANSWER) [email or web address]    Initial dose (DO NOT CHANGE): 0.1 units/kg/hr        -- IV Continuous 05/15/23 1650    05/15/23 1608  insulin aspart U-100 pen 1-10 Units         -- SubQ Before meals & nightly PRN 05/15/23 1509        Hold Oral hypoglycemics while patient is in the hospital. Takes metformin and glipizide at home, as well as 68 units long acting insulin qHS. Patient reports compliance, states he does not take any meal time insulin

## 2023-05-16 ENCOUNTER — TELEPHONE (OUTPATIENT)
Dept: PODIATRY | Facility: CLINIC | Age: 73
End: 2023-05-16
Payer: MEDICARE

## 2023-05-16 PROBLEM — N39.0 UTI (URINARY TRACT INFECTION): Status: ACTIVE | Noted: 2023-05-16

## 2023-05-16 LAB
ANION GAP SERPL CALC-SCNC: 10 MMOL/L (ref 8–16)
ANION GAP SERPL CALC-SCNC: 11 MMOL/L (ref 8–16)
ANION GAP SERPL CALC-SCNC: 9 MMOL/L (ref 8–16)
BASOPHILS # BLD AUTO: 0.05 K/UL (ref 0–0.2)
BASOPHILS NFR BLD: 0.6 % (ref 0–1.9)
BUN SERPL-MCNC: 28 MG/DL (ref 8–23)
BUN SERPL-MCNC: 28 MG/DL (ref 8–23)
BUN SERPL-MCNC: 31 MG/DL (ref 8–23)
CALCIUM SERPL-MCNC: 9.5 MG/DL (ref 8.7–10.5)
CALCIUM SERPL-MCNC: 9.5 MG/DL (ref 8.7–10.5)
CALCIUM SERPL-MCNC: 9.8 MG/DL (ref 8.7–10.5)
CHLORIDE SERPL-SCNC: 101 MMOL/L (ref 95–110)
CHLORIDE SERPL-SCNC: 98 MMOL/L (ref 95–110)
CHLORIDE SERPL-SCNC: 99 MMOL/L (ref 95–110)
CO2 SERPL-SCNC: 28 MMOL/L (ref 23–29)
CO2 SERPL-SCNC: 28 MMOL/L (ref 23–29)
CO2 SERPL-SCNC: 29 MMOL/L (ref 23–29)
CREAT SERPL-MCNC: 1.3 MG/DL (ref 0.5–1.4)
CREAT SERPL-MCNC: 1.6 MG/DL (ref 0.5–1.4)
CREAT SERPL-MCNC: 1.7 MG/DL (ref 0.5–1.4)
DIFFERENTIAL METHOD: ABNORMAL
EOSINOPHIL # BLD AUTO: 0.2 K/UL (ref 0–0.5)
EOSINOPHIL NFR BLD: 2.7 % (ref 0–8)
ERYTHROCYTE [DISTWIDTH] IN BLOOD BY AUTOMATED COUNT: 17 % (ref 11.5–14.5)
EST. GFR  (NO RACE VARIABLE): 41.8 ML/MIN/1.73 M^2
EST. GFR  (NO RACE VARIABLE): 44.9 ML/MIN/1.73 M^2
EST. GFR  (NO RACE VARIABLE): 57.6 ML/MIN/1.73 M^2
GLUCOSE SERPL-MCNC: 379 MG/DL (ref 70–110)
GLUCOSE SERPL-MCNC: 385 MG/DL (ref 70–110)
GLUCOSE SERPL-MCNC: 89 MG/DL (ref 70–110)
HCT VFR BLD AUTO: 29.1 % (ref 40–54)
HGB BLD-MCNC: 8.8 G/DL (ref 14–18)
IMM GRANULOCYTES # BLD AUTO: 0.03 K/UL (ref 0–0.04)
IMM GRANULOCYTES NFR BLD AUTO: 0.4 % (ref 0–0.5)
LEFT ARM BP: 153 MMHG
LYMPHOCYTES # BLD AUTO: 1.4 K/UL (ref 1–4.8)
LYMPHOCYTES NFR BLD: 17.6 % (ref 18–48)
MAGNESIUM SERPL-MCNC: 2 MG/DL (ref 1.6–2.6)
MCH RBC QN AUTO: 25.9 PG (ref 27–31)
MCHC RBC AUTO-ENTMCNC: 30.2 G/DL (ref 32–36)
MCV RBC AUTO: 86 FL (ref 82–98)
MONOCYTES # BLD AUTO: 1.1 K/UL (ref 0.3–1)
MONOCYTES NFR BLD: 13.3 % (ref 4–15)
NEUTROPHILS # BLD AUTO: 5.3 K/UL (ref 1.8–7.7)
NEUTROPHILS NFR BLD: 65.4 % (ref 38–73)
NRBC BLD-RTO: 0 /100 WBC
PHOSPHATE SERPL-MCNC: 2.7 MG/DL (ref 2.7–4.5)
PLATELET # BLD AUTO: 339 K/UL (ref 150–450)
PMV BLD AUTO: 10.5 FL (ref 9.2–12.9)
POCT GLUCOSE: 175 MG/DL (ref 70–110)
POCT GLUCOSE: 238 MG/DL (ref 70–110)
POCT GLUCOSE: 361 MG/DL (ref 70–110)
POCT GLUCOSE: 373 MG/DL (ref 70–110)
POCT GLUCOSE: 388 MG/DL (ref 70–110)
POCT GLUCOSE: 97 MG/DL (ref 70–110)
POCT GLUCOSE: 98 MG/DL (ref 70–110)
POTASSIUM SERPL-SCNC: 4.1 MMOL/L (ref 3.5–5.1)
POTASSIUM SERPL-SCNC: 4.2 MMOL/L (ref 3.5–5.1)
POTASSIUM SERPL-SCNC: 4.6 MMOL/L (ref 3.5–5.1)
RBC # BLD AUTO: 3.4 M/UL (ref 4.6–6.2)
RIGHT ABI: 1.51
RIGHT ARM BP: 169 MMHG
RIGHT DORSALIS PEDIS: 255 MMHG
RIGHT POSTERIOR TIBIAL: 255 MMHG
SODIUM SERPL-SCNC: 136 MMOL/L (ref 136–145)
SODIUM SERPL-SCNC: 137 MMOL/L (ref 136–145)
SODIUM SERPL-SCNC: 140 MMOL/L (ref 136–145)
WBC # BLD AUTO: 8.06 K/UL (ref 3.9–12.7)

## 2023-05-16 PROCEDURE — 87186 SC STD MICRODIL/AGAR DIL: CPT | Mod: 59

## 2023-05-16 PROCEDURE — 99223 PR INITIAL HOSPITAL CARE,LEVL III: ICD-10-PCS | Mod: 25,,, | Performed by: PODIATRIST

## 2023-05-16 PROCEDURE — 83735 ASSAY OF MAGNESIUM: CPT

## 2023-05-16 PROCEDURE — 99223 1ST HOSP IP/OBS HIGH 75: CPT | Mod: 25,,, | Performed by: PODIATRIST

## 2023-05-16 PROCEDURE — 84100 ASSAY OF PHOSPHORUS: CPT

## 2023-05-16 PROCEDURE — 87075 CULTR BACTERIA EXCEPT BLOOD: CPT | Mod: 59

## 2023-05-16 PROCEDURE — 25000003 PHARM REV CODE 250: Performed by: STUDENT IN AN ORGANIZED HEALTH CARE EDUCATION/TRAINING PROGRAM

## 2023-05-16 PROCEDURE — 99223 1ST HOSP IP/OBS HIGH 75: CPT | Mod: ,,, | Performed by: STUDENT IN AN ORGANIZED HEALTH CARE EDUCATION/TRAINING PROGRAM

## 2023-05-16 PROCEDURE — 25000003 PHARM REV CODE 250

## 2023-05-16 PROCEDURE — 87076 CULTURE ANAEROBE IDENT EACH: CPT

## 2023-05-16 PROCEDURE — 99223 PR INITIAL HOSPITAL CARE,LEVL III: ICD-10-PCS | Mod: ,,, | Performed by: STUDENT IN AN ORGANIZED HEALTH CARE EDUCATION/TRAINING PROGRAM

## 2023-05-16 PROCEDURE — 63600175 PHARM REV CODE 636 W HCPCS

## 2023-05-16 PROCEDURE — 99233 PR SUBSEQUENT HOSPITAL CARE,LEVL III: ICD-10-PCS | Mod: GC,,, | Performed by: STUDENT IN AN ORGANIZED HEALTH CARE EDUCATION/TRAINING PROGRAM

## 2023-05-16 PROCEDURE — 87077 CULTURE AEROBIC IDENTIFY: CPT

## 2023-05-16 PROCEDURE — 51798 US URINE CAPACITY MEASURE: CPT

## 2023-05-16 PROCEDURE — 87070 CULTURE OTHR SPECIMN AEROBIC: CPT | Mod: 59

## 2023-05-16 PROCEDURE — 87076 CULTURE ANAEROBE IDENT EACH: CPT | Mod: 59

## 2023-05-16 PROCEDURE — 20600001 HC STEP DOWN PRIVATE ROOM

## 2023-05-16 PROCEDURE — 85025 COMPLETE CBC W/AUTO DIFF WBC: CPT

## 2023-05-16 PROCEDURE — 80048 BASIC METABOLIC PNL TOTAL CA: CPT | Mod: 91

## 2023-05-16 PROCEDURE — 11043 PR DEBRIDEMENT, SKIN, SUB-Q TISSUE,MUSCLE,=<20 SQ CM: ICD-10-PCS | Mod: ,,, | Performed by: PODIATRIST

## 2023-05-16 PROCEDURE — 36415 COLL VENOUS BLD VENIPUNCTURE: CPT

## 2023-05-16 PROCEDURE — 25500020 PHARM REV CODE 255: Performed by: STUDENT IN AN ORGANIZED HEALTH CARE EDUCATION/TRAINING PROGRAM

## 2023-05-16 PROCEDURE — 99233 SBSQ HOSP IP/OBS HIGH 50: CPT | Mod: GC,,, | Performed by: STUDENT IN AN ORGANIZED HEALTH CARE EDUCATION/TRAINING PROGRAM

## 2023-05-16 PROCEDURE — 11043 DBRDMT MUSC&/FSCA 1ST 20/<: CPT | Mod: ,,, | Performed by: PODIATRIST

## 2023-05-16 PROCEDURE — 97165 OT EVAL LOW COMPLEX 30 MIN: CPT

## 2023-05-16 PROCEDURE — A9585 GADOBUTROL INJECTION: HCPCS | Performed by: STUDENT IN AN ORGANIZED HEALTH CARE EDUCATION/TRAINING PROGRAM

## 2023-05-16 PROCEDURE — 97161 PT EVAL LOW COMPLEX 20 MIN: CPT

## 2023-05-16 PROCEDURE — 87075 CULTR BACTERIA EXCEPT BLOOD: CPT

## 2023-05-16 PROCEDURE — 87077 CULTURE AEROBIC IDENTIFY: CPT | Mod: 59

## 2023-05-16 PROCEDURE — 87070 CULTURE OTHR SPECIMN AEROBIC: CPT

## 2023-05-16 PROCEDURE — 97530 THERAPEUTIC ACTIVITIES: CPT

## 2023-05-16 RX ORDER — GADOBUTROL 604.72 MG/ML
10 INJECTION INTRAVENOUS
Status: COMPLETED | OUTPATIENT
Start: 2023-05-16 | End: 2023-05-16

## 2023-05-16 RX ORDER — MUPIROCIN 20 MG/G
OINTMENT TOPICAL 2 TIMES DAILY
Status: DISCONTINUED | OUTPATIENT
Start: 2023-05-16 | End: 2023-05-19 | Stop reason: HOSPADM

## 2023-05-16 RX ORDER — OXYCODONE HYDROCHLORIDE 5 MG/1
5 TABLET ORAL EVERY 6 HOURS PRN
Status: DISCONTINUED | OUTPATIENT
Start: 2023-05-16 | End: 2023-05-19 | Stop reason: HOSPADM

## 2023-05-16 RX ADMIN — PANTOPRAZOLE SODIUM 40 MG: 40 TABLET, DELAYED RELEASE ORAL at 11:05

## 2023-05-16 RX ADMIN — MUPIROCIN: 20 OINTMENT TOPICAL at 09:05

## 2023-05-16 RX ADMIN — OXYCODONE HYDROCHLORIDE 5 MG: 5 TABLET ORAL at 05:05

## 2023-05-16 RX ADMIN — ISOSORBIDE DINITRATE 10 MG: 10 TABLET ORAL at 11:05

## 2023-05-16 RX ADMIN — SODIUM CHLORIDE 1000 ML: 9 INJECTION, SOLUTION INTRAVENOUS at 12:05

## 2023-05-16 RX ADMIN — NIFEDIPINE 60 MG: 30 TABLET, FILM COATED, EXTENDED RELEASE ORAL at 11:05

## 2023-05-16 RX ADMIN — Medication 6 MG: at 09:05

## 2023-05-16 RX ADMIN — ISOSORBIDE DINITRATE 10 MG: 10 TABLET ORAL at 09:05

## 2023-05-16 RX ADMIN — MEROPENEM 1 G: 1 INJECTION INTRAVENOUS at 09:05

## 2023-05-16 RX ADMIN — TAMSULOSIN HYDROCHLORIDE 0.4 MG: 0.4 CAPSULE ORAL at 11:05

## 2023-05-16 RX ADMIN — INSULIN ASPART 10 UNITS: 100 INJECTION, SOLUTION INTRAVENOUS; SUBCUTANEOUS at 06:05

## 2023-05-16 RX ADMIN — ATORVASTATIN CALCIUM 80 MG: 40 TABLET, FILM COATED ORAL at 11:05

## 2023-05-16 RX ADMIN — CLOPIDOGREL BISULFATE 75 MG: 75 TABLET ORAL at 11:05

## 2023-05-16 RX ADMIN — INSULIN ASPART 2 UNITS: 100 INJECTION, SOLUTION INTRAVENOUS; SUBCUTANEOUS at 11:05

## 2023-05-16 RX ADMIN — ACETAMINOPHEN 650 MG: 325 TABLET ORAL at 09:05

## 2023-05-16 RX ADMIN — MUPIROCIN: 20 OINTMENT TOPICAL at 11:05

## 2023-05-16 RX ADMIN — INSULIN DETEMIR 30 UNITS: 100 INJECTION, SOLUTION SUBCUTANEOUS at 09:05

## 2023-05-16 RX ADMIN — ISOSORBIDE DINITRATE 10 MG: 10 TABLET ORAL at 03:05

## 2023-05-16 RX ADMIN — APIXABAN 5 MG: 5 TABLET, FILM COATED ORAL at 11:05

## 2023-05-16 RX ADMIN — MEROPENEM 1 G: 1 INJECTION INTRAVENOUS at 02:05

## 2023-05-16 RX ADMIN — INSULIN ASPART 5 UNITS: 100 INJECTION, SOLUTION INTRAVENOUS; SUBCUTANEOUS at 09:05

## 2023-05-16 RX ADMIN — APIXABAN 5 MG: 5 TABLET, FILM COATED ORAL at 09:05

## 2023-05-16 RX ADMIN — GADOBUTROL 10 ML: 604.72 INJECTION INTRAVENOUS at 01:05

## 2023-05-16 NOTE — ASSESSMENT & PLAN NOTE
MARIO 2019, right side 0.9, left side unable to be obtained. 2/2021 bilateral angiography with no targetable intervention. Has not seen vascular surgery outpatient since Jan 2021.   - Vasc sx consulted, who offered AKA. Patient declined. They have now signed off. Appreciate their help    - On asa/plavix at home. Will hold asa, continue plavix and eliquis (see DVT)  - F/u MARIO

## 2023-05-16 NOTE — ASSESSMENT & PLAN NOTE
Patient's FSGs are uncontrolled due to hyperglycemia on current medication regimen.  Last A1c reviewed-   Lab Results   Component Value Date    HGBA1C 8.4 (H) 03/10/2023     Most recent fingerstick glucose reviewed-   Recent Labs   Lab 05/16/23  0114 05/16/23  0222 05/16/23  0351 05/16/23  0741   POCTGLUCOSE 238* 98 97 175*     Current correctional scale  Medium  Titrate anti-hyperglycemic dose as follows-   Antihyperglycemics (From admission, onward)    Start     Stop Route Frequency Ordered    05/16/23 2100  insulin detemir U-100 (Levemir) pen 30 Units         -- SubQ Nightly 05/16/23 0236    05/15/23 1608  insulin aspart U-100 pen 1-10 Units         -- SubQ Before meals & nightly PRN 05/15/23 1509        Hold Oral hypoglycemics while patient is in the hospital. Takes metformin and glipizide at home, as well as 68 units long acting insulin qHS. Patient reports compliance, states he does not take any meal time insulin

## 2023-05-16 NOTE — PROGRESS NOTES
Pt BG was 428. Correction scale calls for 5 units- Aspart. Nurse admin 5 unit correction scale. Verified by Veronique Saravia LPN.

## 2023-05-16 NOTE — ASSESSMENT & PLAN NOTE
Creatinine 1.7 on admit, recent baseline around 1.6 since March 2023, however was normal previous to this. Will treat as YNES, though could be progression of CKD. Resolved on admission     Plan:   Lab Results   Component Value Date    CREATININE 1.3 05/16/2023

## 2023-05-16 NOTE — HOSPITAL COURSE
Patient admitted to hospital medicine for management of progression of chronic lower extremity osteomyelitis and non healing wounds. Vascular surgery consulted, who recommended AKA. Patient refused. MARIO's unreliable due to non compressible vessels. ID consulted, who noted that antibiotic therapy would likely be ineffective given his degree of vasculopathy and inability to permeate the wounds. Wound care and podiatry also consulted for assistance. Bilateral LE MRI with left chronic OM and right acute OM. Wound cultures with GNRs, presumptive Proteus and Pseudomonas species. Blood cultures NGTD. UA notable for UTI. Has history of ESBL E coli UTI, initiated on empiric meropenem. Culture with pan sensitive Klebsiella, deescalated to ceftriaxone. Completed total 4 days of recommended 7 day course at time of discharge. Insulin gtt initiated on admission for hyperglycemia, transitioned to basal bolus with daily uptitration. Patient still persistently hyperglycemic. Will need continued insulin adjustments in order to obtain proper regimen. He was transferred to [place] for further evaluation with Dr. Patel/interventional cardiology and possible revascularization.     Final podiatry recommendations:  Wound care: 2 times weekly: Cleanse b/l foot and leg wounds with vashe, apply hydrafera blue ready to all wound bases, follow with 4x4s, kerlix, cast padding, and ace.   WB: Non ambulatory   Follow up with  in [facility] podiatry clinic within 2 weeks of discharge. Referral placed.

## 2023-05-16 NOTE — HPI
74 yo M with DM-ID (8.4, improved from 9.9), CKD, PAD, DVT RUE 2022 (on Eliquis), chronic bilateral OM s/p L TMA, with recent hosp admission at 2023 [facility] for staph pasteuri bacteremia and acute on chronic OM (wound cxs+ proteus mirabilis), compelted 6wks Iv-vanc, CTX, and flagyl (ended 23)  - NOW presents (05/15) with acute on chronic OM of left foot/ tibia. Pt c/o increased foul odor of b/L LLE wounds with increased pain.   X-ray L foot: c/f OM of mid/hind foot and distal tibia. MRI pending.  Consulting podiatry, ID and vascular consult. Vascular only offering and recommending AKA (bilaterally) at this time, as pt would not heal from BKAs; but pt again deferring recommended AKA; given near non-ambulatory status of patient and non-healing progressive wounds causing frequent recurrent hosp admissions. Teams will discuss further with patient given the non-healing nature of his wounds. At last hosp admission (2023) with [facility], pt deferred recommended amputation then. Hosp med holding off on abx as pt is stable, and work up his YNES on CKD.  Pt receives wound care at home, but feels getting inadequate WC for his needs, and has transportation issues, so had missed multiple outpt appts. ID consulted for abc recs and management.    Pt afebrile, no leukocytosis. CRP 50, Cr 1.3 (down from 1.6). Blood cxs NGTD.    Past culture data of L foot/le/2023 proteus, 2022 e.cloacae, 2022 GBS / prevotella / MRSA, 2022 PSA (panS).]  Last VAS US (2022): mild peripheral arterial occlusive disease b/L.

## 2023-05-16 NOTE — ASSESSMENT & PLAN NOTE
74 yo M with multiple co-morbidities, non-ambulatory, including advanced PAD of lower extremities, resulting in chronic non-healing wounds and progressive acute on chronic osteo. Pt presents with worsening and progressed acute on chronic OM despite recent completion of Iv-vanc, CTX and flagyl.  recurrent acute on chronic OM Left TMA stump / tibia  Vascular offering / recommending bilateral AKAs, as pt would no longer be able to heal BKAs. Pt still continues to defer recommended amputation, despite non-ambulatory status.      Recommendations / Plan:  · In the setting of PAD with significant stenosis and not a candidate for revascularization, condition not curative with abx alone -- since abx delivery depends on blood flow. Also, will increase her risk for antibiotic related adverse effects.     -- Discussed with ID staff and primary team   -- ID will sign-off. Please re-consult as needed.

## 2023-05-16 NOTE — SUBJECTIVE & OBJECTIVE
Interval History: NAEON. Patient transitioned off insulin gtt, started basal bolus. LE MRI pending. ID and wound care consulted. Started meropenem for symptomatic UTI    Review of Systems   Constitutional:  Negative for fatigue and fever.   HENT:  Negative for congestion.    Eyes:  Negative for visual disturbance.   Respiratory:  Negative for chest tightness, shortness of breath and wheezing.    Cardiovascular:  Negative for chest pain and palpitations.   Gastrointestinal:  Negative for abdominal pain, constipation, diarrhea, rectal pain and vomiting.   Genitourinary:  Positive for frequency. Negative for difficulty urinating.   Musculoskeletal:  Positive for gait problem. Negative for back pain.        Bilateral leg pain   Skin:  Positive for wound.   Neurological:  Negative for dizziness and headaches.   Hematological:  Does not bruise/bleed easily.   Psychiatric/Behavioral:  Negative for confusion.    Objective:     Vital Signs (Most Recent):  Temp: 98.9 °F (37.2 °C) (05/16/23 0806)  Pulse: 69 (05/16/23 0806)  Resp: 17 (05/16/23 0405)  BP: 135/64 (05/16/23 0806)  SpO2: 99 % (05/16/23 0806) Vital Signs (24h Range):  Temp:  [98.2 °F (36.8 °C)-98.9 °F (37.2 °C)] 98.9 °F (37.2 °C)  Pulse:  [47-87] 69  Resp:  [16-19] 17  SpO2:  [92 %-100 %] 99 %  BP: (122-177)/(64-78) 135/64     Weight: 110.5 kg (243 lb 9.7 oz)  Body mass index is 35.97 kg/m².    Intake/Output Summary (Last 24 hours) at 5/16/2023 1346  Last data filed at 5/16/2023 0536  Gross per 24 hour   Intake --   Output 450 ml   Net -450 ml         Physical Exam  Vitals and nursing note reviewed.   Constitutional:       General: He is not in acute distress.     Appearance: Normal appearance. He is not ill-appearing.   HENT:      Head: Normocephalic and atraumatic.   Eyes:      General: No scleral icterus.  Cardiovascular:      Rate and Rhythm: Normal rate and regular rhythm.      Heart sounds: Normal heart sounds. No murmur heard.  Pulmonary:      Effort:  Pulmonary effort is normal. No respiratory distress.      Breath sounds: Normal breath sounds. No wheezing or rales.   Abdominal:      General: Abdomen is flat. There is no distension.      Palpations: Abdomen is soft.      Comments: Suprapubic tenderness   Musculoskeletal:         General: Normal range of motion.      Cervical back: Normal range of motion.      Right lower leg: No edema.      Left lower leg: No edema.   Skin:     Findings: Lesion present.      Comments: Left lower extremity with amputated left foot, stump is dry and intact  Diffuse, cracked erythema along the left leg  Ulcerated lesion with exposed fat and tendon on anterior aspect of right ankle   Right foot cool to touch, pulses unable to be palpated    Neurological:      General: No focal deficit present.      Mental Status: He is alert. Mental status is at baseline.   Psychiatric:      Comments: Flat affect            Significant Labs: All pertinent labs within the past 24 hours have been reviewed.    Significant Imaging: I have reviewed all pertinent imaging results/findings within the past 24 hours.

## 2023-05-16 NOTE — PLAN OF CARE
Problem: Adult Inpatient Plan of Care  Goal: Plan of Care Review  Outcome: Ongoing, Progressing  Goal: Patient-Specific Goal (Individualized)  Outcome: Ongoing, Progressing  Goal: Absence of Hospital-Acquired Illness or Injury  Outcome: Ongoing, Progressing  Goal: Optimal Comfort and Wellbeing  Outcome: Ongoing, Progressing  Goal: Readiness for Transition of Care  Outcome: Ongoing, Progressing     Problem: Impaired Wound Healing  Goal: Optimal Wound Healing  Outcome: Ongoing, Progressing     Problem: Fluid and Electrolyte Imbalance (Acute Kidney Injury/Impairment)  Goal: Fluid and Electrolyte Balance  Outcome: Ongoing, Progressing

## 2023-05-16 NOTE — PLAN OF CARE
PT Evaluation completed and PT POC established.    Problem: Physical Therapy  Goal: Physical Therapy Goal  Description: Goals to be met by: 2023     Patient will increase functional independence with mobility by performin. Supine to sit with [place]  2. Sit to supine with [place]  3. Bed to wheelchair transfer with Supervision using LRAD  4. Wheelchair propulsion x50' with R heel and B UE as needed supvn     Outcome: Ongoing, Progressing

## 2023-05-16 NOTE — CONSULTS
[facility] ([place] [address])  Infectious Disease  Consult Note    Patient Name: Saji Castañeda  MRN: [id number]  Admission Date: 5/15/2023  Hospital Length of Stay: 1 days  Attending Physician: Jm Rosa MD  Primary Care Provider: [facility]     Isolation Status: No active isolations    Patient information was obtained from patient and ER records.      Inpatient consult to Infectious Diseases  Consult performed by: David Dodson PA-C  Consult ordered by: Monica Mathis DO        Assessment/Plan:     ID  * Chronic osteomyelitis  72 yo M with multiple co-morbidities, non-ambulatory, including advanced PAD of lower extremities, resulting in chronic non-healing wounds and progressive acute on chronic osteo. Pt presents with worsening and progressed acute on chronic OM despite recent completion of Iv-vanc, CTX and flagyl.  recurrent acute on chronic OM Left TMA stump / tibia.  Vascular offering / recommending bilateral AKAs, as pt would no longer be able to heal BKAs. Pt still continues to defer recommended amputation, despite non-ambulatory status.      Recommendations / Plan:  In the setting of PAD with significant stenosis and not a candidate for revascularization, condition not curative with abx alone -- since abx delivery depends on blood flow. Also, will increase her risk for antibiotic related adverse effects.     -- Discussed with ID staff and primary team   -- ID will sign-off. Please re-consult as needed.      Thank you for your consult. I will sign off. Please contact us if you have any additional questions.    David Dodson PA-C  Infectious Disease  [facility] ([place] [address])    Subjective:     Principal Problem: Chronic osteomyelitis    HPI: 72 yo M with DM-ID (8.4, improved from 9.9), CKD, PAD, DVT RUE 06/2022 (on Eliquis), chronic bilateral OM s/p L TMA, with recent hosp admission at March/2023 [facility] for staph pasteuri bacteremia and acute on chronic OM  (wound cxs+ proteus mirabilis), compelted 6wks Iv-vanc, CTX, and flagyl (ended 23)  - NOW presents (05/15) with acute on chronic OM of left foot/ tibia. Pt c/o increased foul odor of b/L LLE wounds with increased pain.   X-ray L foot: c/f OM of mid/hind foot and distal tibia. MRI pending.  Consulting podiatry, ID and vascular consult. Vascular only offering and recommending AKA (bilaterally) at this time, as pt would not heal from BKAs; but pt again deferring recommended AKA; given near non-ambulatory status of patient and non-healing progressive wounds causing frequent recurrent hosp admissions. Teams will discuss further with patient given the non-healing nature of his wounds. At last hosp admission (2023) with [facility], pt deferred recommended amputation then. Hosp med holding off on abx as pt is stable, and work up his YNES on CKD.  Pt receives wound care at home, but feels getting inadequate WC for his needs, and has transportation issues, so had missed multiple outpt appts. ID consulted for abc recs and management.    Pt afebrile, no leukocytosis. CRP 50, Cr 1.3 (down from 1.6). Blood cxs NGTD.    Past culture data of L foot/le/2023 proteus, 2022 e.cloacae, 2022 GBS / prevotella / MRSA, 2022 PSA (panS).]  Last VAS US (2022): mild peripheral arterial occlusive disease b/L.                        Past Medical History:   Diagnosis Date    Arthritis     legs    Diabetes mellitus     Diabetes mellitus, type 2     Hyperlipidemia     Hypertension     Osteomyelitis        Past Surgical History:   Procedure Laterality Date    ANGIOGRAPHY OF LOWER EXTREMITY N/A 2/3/2021    Procedure: Angiogram Extremity Bilateral;  Surgeon: Ernst Chacko MD;  Location: [facility] OR [address] [place] [address];  Service: Peripheral Vascular;  Laterality: N/A;  7.4 mintues fluoroscopy time  816.15 mGy  170.17 Gycm2    AORTOGRAPHY WITH EXTREMITY RUNOFF Bilateral 2/3/2021    Procedure: AORTOGRAM, WITH EXTREMITY RUNOFF;  Surgeon:  "Ernst Chacko MD;  Location: [facility] OR [address] [place] [address];  Service: Peripheral Vascular;  Laterality: Bilateral;    DEBRIDEMENT OF FOOT Left 2/23/2021    Procedure: DEBRIDEMENT, LEFT HEEL;  Surgeon: Mayra Schroeder DPM;  Location: [facility] OR [address] [place] [address];  Service: Podiatry;  Laterality: Left;    FOOT AMPUTATION  October 2010    left high midfoot amputation       Review of patient's allergies indicates:  No Known Allergies    Medications:  Medications Prior to Admission   Medication Sig    apixaban (ELIQUIS) 5 mg Tab Take 1 tablet (5 mg total) by mouth 2 (two) times daily.    ascorbic acid, vitamin C, (VITAMIN C) 500 MG tablet Take 500 mg by mouth once daily.    aspirin (ECOTRIN) 81 MG EC tablet Take 81 mg by mouth once daily.    B COMPLEX-VITAMIN B12 tablet Take 1 tablet by mouth once daily.    BD ULTRA-FINE ADITYA PEN NEEDLE 32 gauge x 5/32" Ndle USE FOUR TIMES DAILY WITH MEALS AND NIGHTLY    blood sugar diagnostic (CONTOUR TEST STRIPS) Strp Inject 1 strip into the skin 2 (two) times daily before meals.    clopidogreL (PLAVIX) 75 mg tablet Take 1 tablet (75 mg total) by mouth once daily.    furosemide (LASIX) 40 MG tablet Take 40 mg by mouth every 48 hours.    gabapentin (NEURONTIN) 100 MG capsule Take 2 capsules (200 mg total) by mouth 2 (two) times daily. (Patient not taking: Reported on 3/9/2023)    glipiZIDE (GLUCOTROL) 10 MG tablet Take 20 mg by mouth 2 (two) times a day.    hydrALAZINE (APRESOLINE) 25 MG tablet Take 1 tablet (25 mg total) by mouth 3 (three) times daily.    insulin aspart U-100 (NOVOLOG) 100 unit/mL (3 mL) InPn pen Inject 20 Units into the skin 3 (three) times daily with meals.    isosorbide dinitrate (ISORDIL) 10 MG tablet Take 1 tablet (10 mg total) by mouth 3 (three) times daily.    Lactobacillus rhamnosus GG (CULTURELLE) 10 billion cell capsule Take 1 capsule by mouth 2 (two) times daily.    LANTUS SOLOSTAR U-100 INSULIN glargine 100 units/mL SubQ pen Inject 45 Units into the skin every evening. "    losartan-hydrochlorothiazide 100-25 mg (HYZAAR) 100-25 mg per tablet Take 1 tablet by mouth once daily.    metFORMIN (GLUCOPHAGE-XR) 500 MG ER 24hr tablet Take 1,000 mg by mouth 2 (two) times a day.    MICROLET LANCET Misc     NIFEdipine (ADALAT CC) 60 MG TbSR Take 60 mg by mouth once daily.    pantoprazole (PROTONIX) 40 MG tablet Take 40 mg by mouth once daily.    rosuvastatin (CRESTOR) 40 MG Tab Take 40 mg by mouth once daily.    tamsulosin (FLOMAX) 0.4 mg Cap Take 0.4 mg by mouth once daily.     Antibiotics (From admission, onward)      Start     Stop Route Frequency Ordered    05/16/23 1315  meropenem (MERREM) 1 g in sodium chloride 0.9 % 100 mL IVPB (MB+)         -- IV Every 8 hours (non-standard times) 05/16/23 1200    05/16/23 1030  mupirocin 2 % ointment         05/21 0859 Nasl 2 times daily 05/16/23 0922          Antifungals (From admission, onward)      None          Antivirals (From admission, onward)      None             Immunization History   Administered Date(s) Administered    Influenza - Trivalent (ADULT) 10/08/2020    PPD Test 12/09/2020, 03/02/2021, 06/21/2022    Pneumococcal Conjugate - 13 Valent 10/16/2018       Family History       Problem Relation (Age of Onset)    Cancer Brother    Diabetes Mother, Sister    Heart disease Mother    Stroke Sister          Social History     Socioeconomic History    Marital status: Single   Tobacco Use    Smoking status: Never    Smokeless tobacco: Never   Substance and Sexual Activity    Alcohol use: No     Comment: occassional    Drug use: No    Sexual activity: Not Currently   Social History Narrative    Not currently working; lives with family     Social Determinants of Health     Financial Resource Strain: Low Risk     Difficulty of Paying Living Expenses: Not hard at all   Food Insecurity: No Food Insecurity    Worried About Running Out of Food in the Last Year: Never true    Ran Out of Food in the Last Year: Never true   Transportation Needs: Unmet  Transportation Needs    Lack of Transportation (Medical): Yes    Lack of Transportation (Non-Medical): Yes   Physical Activity: Inactive    Days of Exercise per Week: 0 days    Minutes of Exercise per Session: 0 min   Stress: No Stress Concern Present    Feeling of Stress : Not at all   Social Connections: Unknown    Frequency of Communication with Friends and Family: Once a week    Frequency of Social Gatherings with Friends and Family: Twice a week    Attends Orthodox Services: 1 to 4 times per year    Active Member of Clubs or Organizations: No    Attends Club or Organization Meetings: Never    Marital Status: Patient refused   Housing Stability: Low Risk     Unable to Pay for Housing in the Last Year: No    Number of Places Lived in the Last Year: 1    Unstable Housing in the Last Year: No     Review of Systems   Constitutional:  Negative for appetite change, chills, diaphoresis and fever.   HENT:  Negative for dental problem, facial swelling and trouble swallowing.    Eyes:  Negative for pain and visual disturbance.   Respiratory:  Negative for cough and shortness of breath.    Cardiovascular:  Positive for leg swelling.   Gastrointestinal:  Negative for abdominal distention, abdominal pain, blood in stool, constipation, diarrhea, nausea and vomiting.   Genitourinary:  Negative for decreased urine volume, difficulty urinating, dysuria, flank pain, hematuria and urgency.   Musculoskeletal:  Negative for arthralgias, back pain, joint swelling and neck pain.   Skin:  Positive for color change and wound. Negative for rash.   Neurological:  Negative for weakness and headaches.   Psychiatric/Behavioral:  Negative for confusion.      Objective:     Vital Signs (Most Recent):  Temp: 98.9 °F (37.2 °C) (05/16/23 0806)  Pulse: 69 (05/16/23 0806)  Resp: 17 (05/16/23 0405)  BP: 135/64 (05/16/23 0806)  SpO2: 99 % (05/16/23 0806) Vital Signs (24h Range):  Temp:  [98.2 °F (36.8 °C)-98.9 °F (37.2 °C)] 98.9 °F (37.2 °C)  Pulse:   [47-87] 69  Resp:  [16-19] 17  SpO2:  [92 %-100 %] 99 %  BP: (122-168)/(64-78) 135/64     Weight: 110.5 kg (243 lb 9.7 oz)  Body mass index is 35.97 kg/m².    Estimated Creatinine Clearance: 61.1 mL/min (based on SCr of 1.3 mg/dL).     Physical Exam  Constitutional:       General: He is not in acute distress.     Appearance: Normal appearance. He is ill-appearing (chronically). He is not toxic-appearing or diaphoretic.   HENT:      Head: Normocephalic and atraumatic.      Mouth/Throat:      Mouth: Mucous membranes are moist.      Pharynx: Oropharynx is clear.   Eyes:      General: No scleral icterus.     Conjunctiva/sclera: Conjunctivae normal.   Cardiovascular:      Rate and Rhythm: Normal rate.      Heart sounds: Normal heart sounds.   Pulmonary:      Effort: Pulmonary effort is normal.      Breath sounds: Normal breath sounds.   Abdominal:      General: Bowel sounds are normal. There is no distension.      Palpations: Abdomen is soft.      Tenderness: There is no abdominal tenderness. There is no right CVA tenderness or left CVA tenderness.   Musculoskeletal:         General: Tenderness present. No swelling. Normal range of motion.      Cervical back: Normal range of motion. No rigidity or tenderness.      Right lower leg: No edema.      Left lower leg: No edema.      Comments: Left lower extremity with amputated left foot, stump is dry and intact  Diffuse, cracked erythema along the left leg  Ulcerated lesion with exposed fat and tendon on anterior aspect of right ankle   Right foot cool to touch, pulses unable to be palpated     Lymphadenopathy:      Cervical: No cervical adenopathy.   Skin:     General: Skin is warm and dry.      Findings: Erythema present. No rash.   Neurological:      Mental Status: He is alert and oriented to person, place, and time. Mental status is at baseline.   Psychiatric:         Behavior: Behavior normal.         Thought Content: Thought content normal.        Significant Labs:  Blood Culture:   Recent Labs   Lab 03/10/23  0608 03/12/23  0647 03/12/23  0648 05/15/23  2024 05/15/23  2105   LABBLOO No growth after 5 days. No growth after 5 days. No growth after 5 days. No Growth to date No Growth to date     CBC:   Recent Labs   Lab 05/15/23  1058 05/16/23  0353   WBC 8.45 8.06   HGB 9.2* 8.8*   HCT 30.5* 29.1*    339     CMP:   Recent Labs   Lab 05/15/23  1416 05/15/23  2025 05/16/23  0352    136 140   K 4.5 4.4 4.1   CL 98 97 101   CO2 28 31* 28   * 368* 89   BUN 41* 34* 31*   CREATININE 1.7* 1.6* 1.3   CALCIUM 9.7 9.9 9.5   PROT 7.8  --   --    ALBUMIN 2.7*  --   --    BILITOT 0.3  --   --    ALKPHOS 73  --   --    AST 14  --   --    ALT 13  --   --    ANIONGAP 10 8 11     Microbiology Results (last 7 days)       Procedure Component Value Units Date/Time    Culture, Anaerobe [[id number]] Collected: 05/16/23 1451    Order Status: Sent Specimen: Wound from Foot, Right Updated: 05/16/23 1452    Aerobic culture [[id number]] Collected: 05/16/23 1451    Order Status: Sent Specimen: Wound from Foot, Right Updated: 05/16/23 1452    Aerobic culture [[id number]] Collected: 05/16/23 1451    Order Status: Sent Specimen: Wound from Foot, Right Updated: 05/16/23 1452    Culture, Anaerobe [[id number]] Collected: 05/16/23 1451    Order Status: Sent Specimen: Wound from Foot, Right Updated: 05/16/23 1452    Culture, Anaerobe [[id number]] Collected: 05/16/23 1451    Order Status: Sent Specimen: Wound from Foot, Right Updated: 05/16/23 1452    Aerobic culture [[id number]] Collected: 05/16/23 1451    Order Status: Sent Specimen: Wound from Foot, Right Updated: 05/16/23 1452    Blood culture [[id number]] Collected: 05/15/23 2105    Order Status: Completed Specimen: Blood Updated: 05/16/23 0515     Blood Culture, Routine No Growth to date    Narrative:      Collection has been rescheduled by DF2 at 05/15/2023 20:30 Reason:   Parker RN  patient requesting pain medication.  Collection has been  rescheduled by DF2 at 05/15/2023 20:30 Reason:   Parker RN  patient requesting pain medication.    Blood culture [[id number]] Collected: 05/15/23 2024    Order Status: Completed Specimen: Blood from Peripheral, Left Hand Updated: 05/16/23 0315     Blood Culture, Routine No Growth to date    Urine culture [[id number]] Collected: 05/15/23 1701    Order Status: No result Specimen: Urine Updated: 05/15/23 1730          Wound Culture:   Recent Labs   Lab 03/10/23  1741   LABAERO PROTEUS MIRABILIS  Few  No other significant isolate  *     All pertinent labs within the past 24 hours have been reviewed.    Significant Imaging: I have reviewed all pertinent imaging results/findings within the past 24 hours.    I personally reviewed records today as well as relevant labs, cultures, and imaging. This includes old hospital and clinic records.     Will continue to monitor for antimicrobial toxicities/No toxicities from antimicrobials    Patient is high risk for infectious complications given pt's age, multiple co-morbidities, and case complexity.      Time: 75 minutes   50% of time spent on face-to-face counseling and coordination of care. Counseling included review of test results, diagnosis, and treatment plan with patient and/or family.      \

## 2023-05-16 NOTE — ASSESSMENT & PLAN NOTE
Home regimen: hydralazine 25mg TID, isordil 10mg TID, nifedipine 60 daily, hyzaar 100-25 daily     - continue nifedipine and isordil, add other meds as appropriate

## 2023-05-16 NOTE — ASSESSMENT & PLAN NOTE
UA on admission with 3+ leukocytes, > 100 WBC, many bacteria. He endorses frequency and suprapubic tenderness; no dysuria, urgency, or change in odor of his urine.  - History of ESBL UTI as recently as 2022    - Start empiric meropenem

## 2023-05-16 NOTE — SUBJECTIVE & OBJECTIVE
"                  Past Medical History:   Diagnosis Date    Arthritis     legs    Diabetes mellitus     Diabetes mellitus, type 2     Hyperlipidemia     Hypertension     Osteomyelitis        Past Surgical History:   Procedure Laterality Date    ANGIOGRAPHY OF LOWER EXTREMITY N/A 2/3/2021    Procedure: Angiogram Extremity Bilateral;  Surgeon: Ernst Chacko MD;  Location: [facility] OR [address] [place] [address];  Service: Peripheral Vascular;  Laterality: N/A;  7.4 mintues fluoroscopy time  816.15 mGy  170.17 Gycm2    AORTOGRAPHY WITH EXTREMITY RUNOFF Bilateral 2/3/2021    Procedure: AORTOGRAM, WITH EXTREMITY RUNOFF;  Surgeon: Ernst Chacko MD;  Location: [facility] OR [address] [place] [address];  Service: Peripheral Vascular;  Laterality: Bilateral;    DEBRIDEMENT OF FOOT Left 2/23/2021    Procedure: DEBRIDEMENT, LEFT HEEL;  Surgeon: Mayra Schroeder DPM;  Location: [facility] OR [address] [place] [address];  Service: Podiatry;  Laterality: Left;    FOOT AMPUTATION  October 2010    left high midfoot amputation       Review of patient's allergies indicates:  No Known Allergies    Medications:  Medications Prior to Admission   Medication Sig    apixaban (ELIQUIS) 5 mg Tab Take 1 tablet (5 mg total) by mouth 2 (two) times daily.    ascorbic acid, vitamin C, (VITAMIN C) 500 MG tablet Take 500 mg by mouth once daily.    aspirin (ECOTRIN) 81 MG EC tablet Take 81 mg by mouth once daily.    B COMPLEX-VITAMIN B12 tablet Take 1 tablet by mouth once daily.    BD ULTRA-FINE ADITYA PEN NEEDLE 32 gauge x 5/32" Ndle USE FOUR TIMES DAILY WITH MEALS AND NIGHTLY    blood sugar diagnostic (CONTOUR TEST STRIPS) Strp Inject 1 strip into the skin 2 (two) times daily before meals.    clopidogreL (PLAVIX) 75 mg tablet Take 1 tablet (75 mg total) by mouth once daily.    furosemide (LASIX) 40 MG tablet Take 40 mg by mouth every 48 hours.    gabapentin (NEURONTIN) 100 MG capsule Take 2 capsules (200 mg total) by mouth 2 (two) times daily. (Patient not taking: Reported on 3/9/2023)    glipiZIDE " (GLUCOTROL) 10 MG tablet Take 20 mg by mouth 2 (two) times a day.    hydrALAZINE (APRESOLINE) 25 MG tablet Take 1 tablet (25 mg total) by mouth 3 (three) times daily.    insulin aspart U-100 (NOVOLOG) 100 unit/mL (3 mL) InPn pen Inject 20 Units into the skin 3 (three) times daily with meals.    isosorbide dinitrate (ISORDIL) 10 MG tablet Take 1 tablet (10 mg total) by mouth 3 (three) times daily.    Lactobacillus rhamnosus GG (CULTURELLE) 10 billion cell capsule Take 1 capsule by mouth 2 (two) times daily.    LANTUS SOLOSTAR U-100 INSULIN glargine 100 units/mL SubQ pen Inject 45 Units into the skin every evening.    losartan-hydrochlorothiazide 100-25 mg (HYZAAR) 100-25 mg per tablet Take 1 tablet by mouth once daily.    metFORMIN (GLUCOPHAGE-XR) 500 MG ER 24hr tablet Take 1,000 mg by mouth 2 (two) times a day.    MICROLET LANCET Misc     NIFEdipine (ADALAT CC) 60 MG TbSR Take 60 mg by mouth once daily.    pantoprazole (PROTONIX) 40 MG tablet Take 40 mg by mouth once daily.    rosuvastatin (CRESTOR) 40 MG Tab Take 40 mg by mouth once daily.    tamsulosin (FLOMAX) 0.4 mg Cap Take 0.4 mg by mouth once daily.     Antibiotics (From admission, onward)      Start     Stop Route Frequency Ordered    05/16/23 1315  meropenem (MERREM) 1 g in sodium chloride 0.9 % 100 mL IVPB (MB+)         -- IV Every 8 hours (non-standard times) 05/16/23 1200    05/16/23 1030  mupirocin 2 % ointment         05/21 0859 Nasl 2 times daily 05/16/23 0922          Antifungals (From admission, onward)      None          Antivirals (From admission, onward)      None             Immunization History   Administered Date(s) Administered    Influenza - Trivalent (ADULT) 10/08/2020    PPD Test 12/09/2020, 03/02/2021, 06/21/2022    Pneumococcal Conjugate - 13 Valent 10/16/2018       Family History       Problem Relation (Age of Onset)    Cancer Brother    Diabetes Mother, Sister    Heart disease Mother    Stroke Sister          Social History      Socioeconomic History    Marital status: Single   Tobacco Use    Smoking status: Never    Smokeless tobacco: Never   Substance and Sexual Activity    Alcohol use: No     Comment: occassional    Drug use: No    Sexual activity: Not Currently   Social History Narrative    Not currently working; lives with family     Social Determinants of Health     Financial Resource Strain: Low Risk     Difficulty of Paying Living Expenses: Not hard at all   Food Insecurity: No Food Insecurity    Worried About Running Out of Food in the Last Year: Never true    Ran Out of Food in the Last Year: Never true   Transportation Needs: Unmet Transportation Needs    Lack of Transportation (Medical): Yes    Lack of Transportation (Non-Medical): Yes   Physical Activity: Inactive    Days of Exercise per Week: 0 days    Minutes of Exercise per Session: 0 min   Stress: No Stress Concern Present    Feeling of Stress : Not at all   Social Connections: Unknown    Frequency of Communication with Friends and Family: Once a week    Frequency of Social Gatherings with Friends and Family: Twice a week    Attends Sabianism Services: 1 to 4 times per year    Active Member of Clubs or Organizations: No    Attends Club or Organization Meetings: Never    Marital Status: Patient refused   Housing Stability: Low Risk     Unable to Pay for Housing in the Last Year: No    Number of Places Lived in the Last Year: 1    Unstable Housing in the Last Year: No     Review of Systems   Constitutional:  Negative for appetite change, chills, diaphoresis and fever.   HENT:  Negative for dental problem, facial swelling and trouble swallowing.    Eyes:  Negative for pain and visual disturbance.   Respiratory:  Negative for cough and shortness of breath.    Cardiovascular:  Positive for leg swelling.   Gastrointestinal:  Negative for abdominal distention, abdominal pain, blood in stool, constipation, diarrhea, nausea and vomiting.   Genitourinary:  Negative for decreased  urine volume, difficulty urinating, dysuria, flank pain, hematuria and urgency.   Musculoskeletal:  Negative for arthralgias, back pain, joint swelling and neck pain.   Skin:  Positive for color change and wound. Negative for rash.   Neurological:  Negative for weakness and headaches.   Psychiatric/Behavioral:  Negative for confusion.      Objective:     Vital Signs (Most Recent):  Temp: 98.9 °F (37.2 °C) (05/16/23 0806)  Pulse: 69 (05/16/23 0806)  Resp: 17 (05/16/23 0405)  BP: 135/64 (05/16/23 0806)  SpO2: 99 % (05/16/23 0806) Vital Signs (24h Range):  Temp:  [98.2 °F (36.8 °C)-98.9 °F (37.2 °C)] 98.9 °F (37.2 °C)  Pulse:  [47-87] 69  Resp:  [16-19] 17  SpO2:  [92 %-100 %] 99 %  BP: (122-168)/(64-78) 135/64     Weight: 110.5 kg (243 lb 9.7 oz)  Body mass index is 35.97 kg/m².    Estimated Creatinine Clearance: 61.1 mL/min (based on SCr of 1.3 mg/dL).     Physical Exam  Constitutional:       General: He is not in acute distress.     Appearance: Normal appearance. He is ill-appearing (chronically). He is not toxic-appearing or diaphoretic.   HENT:      Head: Normocephalic and atraumatic.      Mouth/Throat:      Mouth: Mucous membranes are moist.      Pharynx: Oropharynx is clear.   Eyes:      General: No scleral icterus.     Conjunctiva/sclera: Conjunctivae normal.   Cardiovascular:      Rate and Rhythm: Normal rate.      Heart sounds: Normal heart sounds.   Pulmonary:      Effort: Pulmonary effort is normal.      Breath sounds: Normal breath sounds.   Abdominal:      General: Bowel sounds are normal. There is no distension.      Palpations: Abdomen is soft.      Tenderness: There is no abdominal tenderness. There is no right CVA tenderness or left CVA tenderness.   Musculoskeletal:         General: Tenderness present. No swelling. Normal range of motion.      Cervical back: Normal range of motion. No rigidity or tenderness.      Right lower leg: No edema.      Left lower leg: No edema.      Comments: Left lower  extremity with amputated left foot, stump is dry and intact  Diffuse, cracked erythema along the left leg  Ulcerated lesion with exposed fat and tendon on anterior aspect of right ankle   Right foot cool to touch, pulses unable to be palpated     Lymphadenopathy:      Cervical: No cervical adenopathy.   Skin:     General: Skin is warm and dry.      Findings: Erythema present. No rash.   Neurological:      Mental Status: He is alert and oriented to person, place, and time. Mental status is at baseline.   Psychiatric:         Behavior: Behavior normal.         Thought Content: Thought content normal.        Significant Labs: Blood Culture:   Recent Labs   Lab 03/10/23  0608 03/12/23  0647 03/12/23  0648 05/15/23  2024 05/15/23  2105   LABBLOO No growth after 5 days. No growth after 5 days. No growth after 5 days. No Growth to date No Growth to date     CBC:   Recent Labs   Lab 05/15/23  1058 05/16/23  0353   WBC 8.45 8.06   HGB 9.2* 8.8*   HCT 30.5* 29.1*    339     CMP:   Recent Labs   Lab 05/15/23  1416 05/15/23  2025 05/16/23  0352    136 140   K 4.5 4.4 4.1   CL 98 97 101   CO2 28 31* 28   * 368* 89   BUN 41* 34* 31*   CREATININE 1.7* 1.6* 1.3   CALCIUM 9.7 9.9 9.5   PROT 7.8  --   --    ALBUMIN 2.7*  --   --    BILITOT 0.3  --   --    ALKPHOS 73  --   --    AST 14  --   --    ALT 13  --   --    ANIONGAP 10 8 11     Microbiology Results (last 7 days)       Procedure Component Value Units Date/Time    Culture, Anaerobe [[id number]] Collected: 05/16/23 1451    Order Status: Sent Specimen: Wound from Foot, Right Updated: 05/16/23 1452    Aerobic culture [[id number]] Collected: 05/16/23 1451    Order Status: Sent Specimen: Wound from Foot, Right Updated: 05/16/23 1452    Aerobic culture [[id number]] Collected: 05/16/23 1451    Order Status: Sent Specimen: Wound from Foot, Right Updated: 05/16/23 1452    Culture, Anaerobe [[id number]] Collected: 05/16/23 1451    Order Status: Sent Specimen: Wound from  Foot, Right Updated: 05/16/23 1452    Culture, Anaerobe [[id number]] Collected: 05/16/23 1451    Order Status: Sent Specimen: Wound from Foot, Right Updated: 05/16/23 1452    Aerobic culture [[id number]] Collected: 05/16/23 1451    Order Status: Sent Specimen: Wound from Foot, Right Updated: 05/16/23 1452    Blood culture [[id number]] Collected: 05/15/23 2105    Order Status: Completed Specimen: Blood Updated: 05/16/23 0515     Blood Culture, Routine No Growth to date    Narrative:      Collection has been rescheduled by DF2 at 05/15/2023 20:30 Reason:   Parker RN  patient requesting pain medication.  Collection has been rescheduled by DF2 at 05/15/2023 20:30 Reason:   Parker RN  patient requesting pain medication.    Blood culture [[id number]] Collected: 05/15/23 2024    Order Status: Completed Specimen: Blood from Peripheral, Left Hand Updated: 05/16/23 0315     Blood Culture, Routine No Growth to date    Urine culture [[id number]] Collected: 05/15/23 1701    Order Status: No result Specimen: Urine Updated: 05/15/23 1730          Wound Culture:   Recent Labs   Lab 03/10/23  1741   LABAERO PROTEUS MIRABILIS  Few  No other significant isolate  *     All pertinent labs within the past 24 hours have been reviewed.    Significant Imaging: I have reviewed all pertinent imaging results/findings within the past 24 hours.    I personally reviewed records today as well as relevant labs, cultures, and imaging. This includes old hospital and clinic records.     Will continue to monitor for antimicrobial toxicities/No toxicities from antimicrobials    Patient is high risk for infectious complications given pt's age, multiple co-morbidities, and case complexity.      Time: 75 minutes   50% of time spent on face-to-face counseling and coordination of care. Counseling included review of test results, diagnosis, and treatment plan with patient and/or family.      \

## 2023-05-16 NOTE — CONSULTS
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 He has had these wound for years, and [facility]. He recently presented 3/2023 for BLE cellulitis, found to have subacute OM of the left foot, and acute OM of the right ankle. He was treated with IV Vanc and Ceftriaxone as well as po flagyl for six weeks, and this was recently completed. During that admission, surgery was consulted for bilateral BKA, which has been discussed several times. He declined. Wound care comes to his house where he lives with three roommates twice a week, every Monday and Friday. He feels that the wounds are not being adequately wrapped. Today he comes in because the wound wrappings have come loose and he asked his wound care nurses to bring him to the hospital. He does not notice change in appearance to the wounds. There is constant drainage, however has not increased recently. He otherwise does not care for his wounds. He has a shuttle service take him to wound care appointments, however he has had difficulty with transportation recently, thus causing him to miss appointments.      Scheduled Meds:   apixaban  5 mg Oral BID    atorvastatin  80 mg Oral Daily    clopidogreL  75 mg Oral Daily    insulin detemir U-100  30 Units Subcutaneous QHS    isosorbide dinitrate  10 mg Oral TID    meropenem (MERREM) IVPB  1 g  Intravenous Q8H    mupirocin   Nasal BID    NIFEdipine  60 mg Oral Daily    pantoprazole  40 mg Oral Daily    tamsulosin  0.4 mg Oral Daily     Continuous Infusions:  PRN Meds:acetaminophen, dextrose 10%, dextrose 10%, dextrose, dextrose, glucagon (human recombinant), insulin aspart U-100, melatonin, naloxone, oxyCODONE, sodium chloride 0.9%    Review of patient's allergies indicates:  No Known Allergies     Past Medical History:   Diagnosis Date    Arthritis     legs    Diabetes mellitus     Diabetes mellitus, type 2     Hyperlipidemia     Hypertension     Osteomyelitis      Past Surgical History:   Procedure Laterality Date    ANGIOGRAPHY OF LOWER EXTREMITY N/A 2/3/2021    Procedure: Angiogram Extremity Bilateral;  Surgeon: Ernst Chacko MD;  Location: [facility] OR [address] [place] [address];  Service: Peripheral Vascular;  Laterality: N/A;  7.4 mintues fluoroscopy time  816.15 mGy  170.17 Gycm2    AORTOGRAPHY WITH EXTREMITY RUNOFF Bilateral 2/3/2021    Procedure: AORTOGRAM, WITH EXTREMITY RUNOFF;  Surgeon: Ernst Chacko MD;  Location: [facility] OR [address] [place] [address];  Service: Peripheral Vascular;  Laterality: Bilateral;    DEBRIDEMENT OF FOOT Left 2/23/2021    Procedure: DEBRIDEMENT, LEFT HEEL;  Surgeon: Mayra Schroeder DPM;  Location: [facility] OR [address] [place] [address];  Service: Podiatry;  Laterality: Left;    FOOT AMPUTATION  October 2010    left high midfoot amputation       Family History       Problem Relation (Age of Onset)    Cancer Brother    Diabetes Mother, Sister    Heart disease Mother    Stroke Sister          Tobacco Use    Smoking status: Never    Smokeless tobacco: Never   Substance and Sexual Activity    Alcohol use: No     Comment: occassional    Drug use: No    Sexual activity: Not Currently     Review of Systems   Constitutional:  Negative for appetite change, chills, diaphoresis and fever.   HENT:  Negative for dental problem, facial swelling and trouble swallowing.    Eyes:  Negative for pain and visual disturbance.    Respiratory:  Negative for cough and shortness of breath.    Cardiovascular:  Positive for leg swelling.   Gastrointestinal:  Negative for abdominal distention, abdominal pain, blood in stool, constipation, diarrhea, nausea and vomiting.   Genitourinary:  Negative for decreased urine volume, difficulty urinating, dysuria, flank pain, hematuria and urgency.   Musculoskeletal:  Negative for arthralgias, back pain, joint swelling and neck pain.   Skin:  Positive for color change and wound. Negative for rash.   Neurological:  Negative for weakness and headaches.   Psychiatric/Behavioral:  Negative for confusion.    Objective:     Vital Signs (Most Recent):  Temp: 98.9 °F (37.2 °C) (05/16/23 0806)  Pulse: 69 (05/16/23 0806)  Resp: 17 (05/16/23 0405)  BP: 135/64 (05/16/23 0806)  SpO2: 99 % (05/16/23 0806) Vital Signs (24h Range):  Temp:  [98.2 °F (36.8 °C)-98.9 °F (37.2 °C)] 98.9 °F (37.2 °C)  Pulse:  [50-87] 69  Resp:  [16-19] 17  SpO2:  [92 %-100 %] 99 %  BP: (122-168)/(64-78) 135/64     Weight: 110.5 kg (243 lb 9.7 oz)  Body mass index is 35.97 kg/m².    Foot Exam    General  General Appearance: appears stated age and healthy   Orientation: alert and oriented to person, place, and time   Affect: appropriate       Right Foot/Ankle     Inspection and Palpation  Ecchymosis: none  Tenderness: none   Swelling: none   Skin Exam: dry skin, fissure, skin changes, abnormal color and ulcer; no drainage and skin not intact     Neurovascular  Dorsalis pedis: absent  Posterior tibial: absent  Saphenous nerve sensation: diminished  Tibial nerve sensation: diminished  Superficial peroneal nerve sensation: diminished  Deep peroneal nerve sensation: diminished  Sural nerve sensation: diminished    Comments  Right anterior ankle full thickness ulceration with anterior tibial tendon exposed. Tendon appears desiccated, wound base has thick yellow biofilm and slough. Slight mal odor noted. No purulence or active drainage. No erythema or  edema. No increase in local warmth. Probes deep to bone.     Necrotic posterior heel wound     Left Foot/Ankle      Inspection and Palpation  Ecchymosis: none  Tenderness: none   Swelling: none   Skin Exam: ulcer;     Neurovascular  Dorsalis pedis: absent  Posterior tibial: absent  Saphenous nerve sensation: diminished  Tibial nerve sensation: diminished  Superficial peroneal nerve sensation: diminished  Deep peroneal nerve sensation: diminished  Sural nerve sensation: diminished    Comments  Full thickness heel wound 70% fibrotic, 30% granular, no active drainage, dense layer of slough, no purulence, no direct deep probe to bone.     Several blisters noted on anterior leg     Posterior full thickness leg wound 70% fibrotic, 30% granular, no active drainage, dense layer of slough, no purulence, no direct deep probe to bone.       Laboratory:  A1C:   Recent Labs   Lab 03/10/23  0608   HGBA1C 8.4*     CBC:   Recent Labs   Lab 05/16/23  0353   WBC 8.06   RBC 3.40*   HGB 8.8*   HCT 29.1*      MCV 86   MCH 25.9*   MCHC 30.2*     CMP:   Recent Labs   Lab 05/15/23  1416 05/15/23  2025 05/16/23  0352   *   < > 89   CALCIUM 9.7   < > 9.5   ALBUMIN 2.7*  --   --    PROT 7.8  --   --       < > 140   K 4.5   < > 4.1   CO2 28   < > 28   CL 98   < > 101   BUN 41*   < > 31*   CREATININE 1.7*   < > 1.3   ALKPHOS 73  --   --    ALT 13  --   --    AST 14  --   --    BILITOT 0.3  --   --     < > = values in this interval not displayed.     CRP:   Recent Labs   Lab 05/15/23  1416   CRP 50.5*     ESR:   Recent Labs   Lab 05/15/23  1058   SEDRATE 105*     Microbiology Results (last 7 days)       Procedure Component Value Units Date/Time    Urine culture [[id number]]  (Abnormal) Collected: 05/15/23 1701    Order Status: Completed Specimen: Urine Updated: 05/16/23 1609     Urine Culture, Routine GRAM NEGATIVE TODD  10,000 - 49,999 cfu/ml  Identification and susceptibility pending      Narrative:      Specimen  Source->Urine    Culture, Anaerobe [[id number]] Collected: 05/16/23 1451    Order Status: Sent Specimen: Wound from Foot, Right Updated: 05/16/23 1452    Aerobic culture [[id number]] Collected: 05/16/23 1451    Order Status: Sent Specimen: Wound from Foot, Right Updated: 05/16/23 1452    Aerobic culture [[id number]] Collected: 05/16/23 1451    Order Status: Sent Specimen: Wound from Foot, Right Updated: 05/16/23 1452    Culture, Anaerobe [[id number]] Collected: 05/16/23 1451    Order Status: Sent Specimen: Wound from Foot, Right Updated: 05/16/23 1452    Culture, Anaerobe [[id number]] Collected: 05/16/23 1451    Order Status: Sent Specimen: Wound from Foot, Right Updated: 05/16/23 1452    Aerobic culture [[id number]] Collected: 05/16/23 1451    Order Status: Sent Specimen: Wound from Foot, Right Updated: 05/16/23 1452    Blood culture [[id number]] Collected: 05/15/23 2105    Order Status: Completed Specimen: Blood Updated: 05/16/23 0515     Blood Culture, Routine No Growth to date    Narrative:      Collection has been rescheduled by DF2 at 05/15/2023 20:30 Reason:   Parker RN  patient requesting pain medication.  Collection has been rescheduled by DF2 at 05/15/2023 20:30 Reason:   Parker RN  patient requesting pain medication.    Blood culture [[id number]] Collected: 05/15/23 2024    Order Status: Completed Specimen: Blood from Peripheral, Left Hand Updated: 05/16/23 0315     Blood Culture, Routine No Growth to date          Specimen (24h ago, onward)      None          All pertinent labs reviewed within the last 24 hours.    Diagnostic Results:  I have reviewed all pertinent imaging results/findings within the past 24 hours.    Clinical Findings:                                        Assessment/Plan:     ID  Osteomyelitis of right lower extremity  See rest of plan     Osteomyelitis of left lower extremity  See rest of plan     Orthopedic  Nonhealing ulcer of right lower leg with fat layer exposed  IDSA moderate  diabetic foot infection with bilateral full-thickness ulcerations of the lower extremity and osteomyelitis. Right foot MRI with findings concerning for early osteomyelitis of the navicular and medial cuneiform.  Left foot MRI indicates posterior calcaneal osteomyelitis.  Right foot x-ray with chronic changes in the 5th metatarsal.  Left foot x-ray with potential solitary bubble of soft tissue emphysema just inferior to the calcaneus.  ABIs were ordered and deemed unreliable due to noncompressible calcified vessels.  Bilateral lower extremity ultrasound without evidence for vascular occlusion or hemodynamically significant stenosis of the right or left lower extremity arterial system.  Vascular surgery assess the patient and deemed that he was not a revascularization candidate due to nonambulatory status, noting patient can no longer heel BKA is now recommending bilateral AKA's.  Vascular surgery notes the patient is not amenable.  When I spoke with patient he seemed willing to consider the possibility.    No surgical or bedside intervention planned form Podiatry  Right anterior ankle wound cultures obtained in addition to left plantar wound cultures as well  Santyl enzymatic wound debridment ontment ordered   Hydrafera blue ready ordered   Heel offloading boots ordered, to be wore at all times   Wounds cleansed and dressed per podiatry   Excisional wound debridment of right anterior ankle, necrosing tendon exposed was excsied as serves for path for infection   Antibiotics per primary/ID   Nursing wound care routine ordered  Discussed that if vascular surgery was not confident in the patient's potential to heal a BKA likelihood of antibiotics penetrating and having the necessary bio availability to heal the lower extremity bone infections is very low.  Podiatry will follow    Thank you for your consult. I will follow-up with patient. Please contact us if you have any additional questions.    Ronaldo Duff DPM,  PGY-2  Podiatry / Foot and Ankle Surgery   Page # [phone]  Secure chat preferred

## 2023-05-16 NOTE — PT/OT/SLP EVAL
Occupational Therapy   Evaluation    Name: Saji Castañeda  MRN: [id number]  Admitting Diagnosis: Chronic osteomyelitis  Recent Surgery: * No surgery found *      Recommendations:     Discharge Recommendations: other (see comments)  Discharge Equipment Recommendations:     Barriers to discharge:  Inaccessible home environment, Other (Comment) (pt. has 3 steps with BHR)    Assessment:     Saji Castañeda is a 73 y.o. male with a medical diagnosis of Chronic osteomyelitis.  He presents with deficits in functional mobility as well as ADL tasks. Pt. Pleasant and cooperative on this date. Pt. With significantly impaired skin on RLE anterior and posterior surface as well as previous amputation on LLE (foot). Pt. Reportedly typically slides in and out wheelchair and is able to propel self in w/c. LBD has been difficult for pt. Per chart, it appears as though pt. Is declining sx options . Pt. Does have 3 steps with BHR to enter home and reports not really leaving house. Pt. With NWB to LLE and WB through R heel for transfers only as clarified by MD/( Monica Mathis)  on this date which will make getting back into house challenging for family. Patient would benefit from continued OT services to maximize level of safety and independence with self-care tasks.    . Performance deficits affecting function: weakness, impaired endurance, impaired self care skills, orthopedic precautions, pain, decreased lower extremity function, impaired skin, impaired functional mobility.      Rehab Prognosis: Good; patient would benefit from acute skilled OT services to address these deficits and reach maximum level of function.       Plan:     Patient to be seen 2 x/week to address the above listed problems via self-care/home management, therapeutic activities, therapeutic exercises, neuromuscular re-education  Plan of Care Expires: 06/15/23  Plan of Care Reviewed with: patient, sibling    Subjective     Chief Complaint: sore on LLE  heel  Patient/Family Comments/goals: to get his wheelchair to hospital so he can move around     Occupational Profile:  Living Environment: Pt. Resides with his brother and sister in ss house with 3 steps to enter and BHR. Pt. Has a tub shower but sponge bathes  Previous level of function: Pt. Reported that he typically slides himself into his wheelchair from his bed and then can propel himself. He does not have a slide board. Pt. Reports more difficulty residing pants lately and OT instructed pt. On donning in supine (athletic type shorts)  Roles and Routines: caretaker of self, brother  Equipment Used at Home: walker, standard, wheelchair, grab bar  Assistance upon Discharge: brother and sister    Pain/Comfort:  Pain Rating 1:  (did not quantify)  Location - Side 1: Left  Location - Orientation 1: lower  Location 1: leg  Pain Addressed 1: Reposition, Distraction, Pre-medicate for activity  Pain Rating Post-Intervention 1:  (no increases in pain noted)    Patients cultural, spiritual, Latter-day conflicts given the current situation: no    Objective:     Communicated with: nurse prior to session.  Patient found supine with   upon OT entry to room.    General Precautions: Standard, fall  Orthopedic Precautions: LLE non weight bearing (RLE WB through heel for TRANSFERS ONLY)  Braces: N/A  Respiratory Status: Room air    Occupational Performance:    Bed Mobility:    Patient completed Scooting/Bridging with SBA along EOB using bUE  Patient completed Supine to Sit with supervision  Patient completed Sit to Supine with stand by assistance    Functional Mobility/Transfers:  Not tested    Activities of Daily Living:  Lower Body Dressing: educated on technique in supine and to use shorts with wider leg openings    Cognitive/Visual Perceptual:  Cognitive/Psychosocial Skills:     -       Oriented to: Person, Place, Time, and Situation   -       Follows Commands/attention:Follows multistep  commands  -       Communication:  clear/fluent  -       Memory: No Deficits noted  -       Safety awareness/insight to disability: intact   -       Mood/Affect/Coping skills/emotional control: Appropriate to situation  Visual/Perceptual:      -Intact .    Physical Exam:  Balance: -       sit: good  Postural examination/scapula alignment:    -       Rounded shoulders  -       Posterior pelvic tilt  Skin integrity: Wound RLE anterior ankle and posterior heel region, and LLE  Upper Extremity Range of Motion:     -       Right Upper Extremity: WNL  -       Left Upper Extremity: WNL  Upper Extremity Strength:    -       Right Upper Extremity: WNL  -       Left Upper Extremity: WNL   Strength:    -       Right Upper Extremity: WNL  -       Left Upper Extremity: WNL    AMPAC 6 Click ADL:  AMPAC Total Score: 21    Treatment & Education:  Pt. Educated on role of OT and pOC    Patient left supine with all lines intact and call button in reach    GOALS:   Multidisciplinary Problems       Occupational Therapy Goals          Problem: Occupational Therapy    Goal Priority Disciplines Outcome Interventions   Occupational Therapy Goal     OT, PT/OT Ongoing, Progressing    Description: Goals to be met by: 05-26-23     Patient will increase functional independence with ADLs by performing:    LE Dressing with Set-up Assistance in supine  Toileting from bedside commode  (drop arm) with Modified [place] for hygiene and clothing management.   Toilet transfer to toilet with Modified [place].  Pt. To be I with hEP to improve level of endurance for BUE                         History:     Past Medical History:   Diagnosis Date    Arthritis     legs    Diabetes mellitus     Diabetes mellitus, type 2     Hyperlipidemia     Hypertension     Osteomyelitis          Past Surgical History:   Procedure Laterality Date    ANGIOGRAPHY OF LOWER EXTREMITY N/A 2/3/2021    Procedure: Angiogram Extremity Bilateral;  Surgeon: Ernst Chacko MD;  Location: [facility]  FLR;  Service: Peripheral Vascular;  Laterality: N/A;  7.4 mintues fluoroscopy time  816.15 mGy  170.17 Gycm2    AORTOGRAPHY WITH EXTREMITY RUNOFF Bilateral 2/3/2021    Procedure: AORTOGRAM, WITH EXTREMITY RUNOFF;  Surgeon: Ernst Chacko MD;  Location: [facility] OR 2ND FLR;  Service: Peripheral Vascular;  Laterality: Bilateral;    DEBRIDEMENT OF FOOT Left 2/23/2021    Procedure: DEBRIDEMENT, LEFT HEEL;  Surgeon: Mayra Schroeder DPM;  Location: [facility] OR 1ST FLR;  Service: Podiatry;  Laterality: Left;    FOOT AMPUTATION  October 2010    left high midfoot amputation       Time Tracking:     OT Date of Treatment: 05/16/23  OT Start Time: 1138  OT Stop Time: 1151  OT Total Time (min): 13 min    Billable Minutes:Evaluation 13    5/16/2023

## 2023-05-16 NOTE — PT/OT/SLP EVAL
"Physical Therapy Evaluation    Patient Name:  Saji Castañeda   MRN:  [id number]    Recommendations:     Discharge Recommendations: other (see comments)   Discharge Equipment Recommendations: none   Barriers to discharge: Pt currently requiring increased level of assistance with mobility    Assessment:     Saji Castañeda is a 73 y.o. male admitted with a medical diagnosis of Chronic osteomyelitis.  He presents with the following impairments/functional limitations: weakness, impaired endurance, impaired functional mobility, decreased lower extremity function.    Pleasant and cooperative, motivated. Pt performed bed mobility with 1 person for safety and good sitting balance. Unable to assess transfer due to R heel WB precaution with no [employer] shoe present in room, MD aware and notified. Pt will benefit from skilled PT services while in house in order to address the aforementioned deficits.      Rehab Prognosis: Good; patient would benefit from acute skilled PT services to address these deficits and reach maximum level of function.    Recent Surgery: * No surgery found *      Plan:     During this hospitalization, patient to be seen 3 x/week to address the identified rehab impairments via therapeutic activities, therapeutic exercises, neuromuscular re-education, wheelchair management/training and progress toward the following goals:    Plan of Care Expires:  06/16/23    Subjective     "I'm the oldest of my siblings"    Pain/Comfort:  Pain Rating 1: 0/10  Pain Rating Post-Intervention 1: 0/10    Patients cultural, spiritual, Denominational conflicts given the current situation: no    Living Environment:  Per pt, pt resides with siblings in [facility] with 3 LON B HR. Pt receives sponge baths at home.  Prior to admission, patients level of function was ind squat pivot transfer to wheelchair.  Equipment used at home: walker, standard, wheelchair, grab bar.  DME owned (not currently used): none.  Upon discharge, patient will have " assistance from siblings.    Objective:     Communicated with RN prior to session.  Patient found HOB elevated with    upon PT entry to room.    General Precautions: Standard, fall  Orthopedic Precautions:LLE non weight bearing, RLE partial weight bearing (R heel only)   Braces: N/A  Respiratory Status: Room air    Exams:  RLE ROM: WFL  RLE Strength: Grossly 3/5  LLE ROM: WFL  LLE Strength: Grossly 3/5    Functional Mobility:  Bed Mobility:     Scooting: contact guard assistance  Supine to Sit: contact guard assistance  Sit to Supine: contact guard assistance  Balance:   Good sitting balance      AM-PAC 6 CLICK MOBILITY  Total Score:16       Treatment & Education:  Demonstrated multiple reps of B triceps ext to lift hips off mat  Required new rafiq pads and performed rolling supvn    Educated pt on PT role/POC  Educated pt on importance of OOB activity and daily ambulation   Pt educated on proper body mechanics, safety techniques, and energy conservation with PT facilitation and cueing throughout session   Pt verbalized understanding      Patient left HOB elevated with call button in reach and RN notified.    GOALS:   Multidisciplinary Problems       Physical Therapy Goals          Problem: Physical Therapy    Goal Priority Disciplines Outcome Goal Variances Interventions   Physical Therapy Goal     PT, PT/OT Ongoing, Progressing     Description: Goals to be met by: 2023     Patient will increase functional independence with mobility by performin. Supine to sit with [place]  2. Sit to supine with [place]  3. Bed to wheelchair transfer with Supervision using LRAD  4. Wheelchair propulsion x50' with R heel and B UE as needed supvn                          History:     Past Medical History:   Diagnosis Date    Arthritis     legs    Diabetes mellitus     Diabetes mellitus, type 2     Hyperlipidemia     Hypertension     Osteomyelitis        Past Surgical History:   Procedure Laterality Date     ANGIOGRAPHY OF LOWER EXTREMITY N/A 2/3/2021    Procedure: Angiogram Extremity Bilateral;  Surgeon: Ernst Chacko MD;  Location: [facility] OR 2ND FLR;  Service: Peripheral Vascular;  Laterality: N/A;  7.4 mintues fluoroscopy time  816.15 mGy  170.17 Gycm2    AORTOGRAPHY WITH EXTREMITY RUNOFF Bilateral 2/3/2021    Procedure: AORTOGRAM, WITH EXTREMITY RUNOFF;  Surgeon: Ernst Chacko MD;  Location: [facility] OR 2ND FLR;  Service: Peripheral Vascular;  Laterality: Bilateral;    DEBRIDEMENT OF FOOT Left 2/23/2021    Procedure: DEBRIDEMENT, LEFT HEEL;  Surgeon: Mayra Schroeder DPM;  Location: [facility] OR 1ST FLR;  Service: Podiatry;  Laterality: Left;    FOOT AMPUTATION  October 2010    left high midfoot amputation       Time Tracking:     PT Received On: 05/16/23  PT Start Time: 1603     PT Stop Time: 1619  PT Total Time (min): 16 min     Billable Minutes: Evaluation 8 and Therapeutic Activity 8      05/16/2023

## 2023-05-16 NOTE — HPI
73 year old male with history of T2DM c/b neuropathy, chronic bilateral OM s/p left foot amputation, htn, CKD, PVD, DVT of RUE in June 2022, and asymptomatic Mobitz Type 1 AV block presenting for worsening lower extremity wounds. He has had these wound for years, and follows with [facility]. He recently presented 3/2023 for BLE cellulitis, found to have subacute OM of the left foot, and acute OM of the right ankle. He was treated with IV Vanc and Ceftriaxone as well as po flagyl for six weeks, and this was recently completed. During that admission, surgery was consulted for bilateral BKA, which has been discussed several times. He declined. Wound care comes to his house where he lives with three roommates twice a week, every Monday and Friday. He feels that the wounds are not being adequately wrapped. Today he comes in because the wound wrappings have come loose and he asked his wound care nurses to bring him to the hospital. He does not notice change in appearance to the wounds. There is constant drainage, however has not increased recently. He otherwise does not care for his wounds. He has a shuttle service take him to wound care appointments, however he has had difficulty with transportation recently, thus causing him to miss appointments.

## 2023-05-16 NOTE — PLAN OF CARE
Problem: Occupational Therapy  Goal: Occupational Therapy Goal  Description: Goals to be met by: 05-26-23     Patient will increase functional independence with ADLs by performing:    LE Dressing with Set-up Assistance in supine  Toileting from bedside commode  (drop arm) with Modified [place] for hygiene and clothing management.   Toilet transfer to toilet with Modified [place].  Pt. To be I with hEP to improve level of endurance for BUE    Outcome: Ongoing, Progressing

## 2023-05-16 NOTE — NURSING
Will notify pt refused MRI. Pt educated on the importance of scan, following medical orders,  and hospital policy. Will continue to  monitor pt.

## 2023-05-16 NOTE — PROGRESS NOTES
"[facility] - Intensive Care ([address] Medicine  Progress Note    Patient Name: Saji Castañeda  MRN: [id number]  Patient Class: IP- Inpatient   Admission Date: 5/15/2023  Length of Stay: 1 days  Attending Physician: Jm Rosa MD  Primary Care Provider: [facility]        Subjective:     Principal Problem:Chronic osteomyelitis        HPI:  73 year old male with history of T2DM c/b neuropathy, chronic bilateral OM s/p left foot amputation, htn, CKD, PVD, DVT of RUE in June 2022, and asymptomatic Mobitz Type 1 AV block presenting for worsening lower extremity wounds. He has had these wound for years, and follows with [facility]. He recently presented 3/2023 for BLE cellulitis, found to have subacute OM of the left foot, and acute OM of the right ankle. He was treated with IV Vanc and Ceftriaxone as well as po flagyl for six weeks, and this was recently completed. During that admission, surgery was consulted for bilateral BKA, which has been discussed several times. He declined.   Wound care comes to his house where he lives with three roommates twice a week, every Monday and Friday. He feels that the wounds are not being adequately wrapped. Today he comes in because the wound wrappings have come loose and he asked his wound care nurses to bring him to the hospital. He does not notice change in appearance to the wounds. There is constant drainage, however has not increased recently. He otherwise does not care for his wounds. He has a shuttle service take him to wound care appointments, however he has had difficulty with transportation recently, thus causing him to miss appointments. He states he can stand on his legs "a little"; he can walk to his front door with a walker. One episode of diarrhea last week, otherwise has been well and without fevers. He has never smoked, does not drink alcohol, does not utilize illicit drugs.     On arrival to [facility] [place], [place], he has a temp of 100, HR 95, " /80, on RA. WBC 8.45, Hgb 9.2 (at baseline), plts 406. CMP pending. . CRP pending. Lactate 1.8. Foot XR wtih soft tissue defect along the dorsal aspect of the right midfoot, and irregularity and patchy lucency in the calcaneus, talus, and distal tibia of the left foot. MRI bilateral lower extremities pending. ID, podiatry, and vascular surgery consulted. He has been admitted to hospital medicine for management of bilateral LE OM.       Overview/Hospital Course:  Patient admitted to hospital medicine for management of progression of chronic lower extremity osteomyelitis and non healing wounds. Vascular surgery consulted, who recommended AKA. Patient refused. ID consulted. Wound care consulted. Bilateral LE MRI pending. Blood cultures NGTD. Glucose resolved with insulin gtt, transitioned to basal bolus. UA notable for UTI, patient also endorsing frequency and suprapubic tenderness. Has history of ESBL E coli UTI, initiated on empiric meropenem.       Interval History: NAEON. Patient transitioned off insulin gtt, started basal bolus. LE MRI pending. ID and wound care consulted. Started meropenem for symptomatic UTI    Review of Systems   Constitutional:  Negative for fatigue and fever.   HENT:  Negative for congestion.    Eyes:  Negative for visual disturbance.   Respiratory:  Negative for chest tightness, shortness of breath and wheezing.    Cardiovascular:  Negative for chest pain and palpitations.   Gastrointestinal:  Negative for abdominal pain, constipation, diarrhea, rectal pain and vomiting.   Genitourinary:  Positive for frequency. Negative for difficulty urinating.   Musculoskeletal:  Positive for gait problem. Negative for back pain.        Bilateral leg pain   Skin:  Positive for wound.   Neurological:  Negative for dizziness and headaches.   Hematological:  Does not bruise/bleed easily.   Psychiatric/Behavioral:  Negative for confusion.    Objective:     Vital Signs (Most Recent):  Temp: 98.9  °F (37.2 °C) (05/16/23 0806)  Pulse: 69 (05/16/23 0806)  Resp: 17 (05/16/23 0405)  BP: 135/64 (05/16/23 0806)  SpO2: 99 % (05/16/23 0806) Vital Signs (24h Range):  Temp:  [98.2 °F (36.8 °C)-98.9 °F (37.2 °C)] 98.9 °F (37.2 °C)  Pulse:  [47-87] 69  Resp:  [16-19] 17  SpO2:  [92 %-100 %] 99 %  BP: (122-177)/(64-78) 135/64     Weight: 110.5 kg (243 lb 9.7 oz)  Body mass index is 35.97 kg/m².    Intake/Output Summary (Last 24 hours) at 5/16/2023 1346  Last data filed at 5/16/2023 0536  Gross per 24 hour   Intake --   Output 450 ml   Net -450 ml         Physical Exam  Vitals and nursing note reviewed.   Constitutional:       General: He is not in acute distress.     Appearance: Normal appearance. He is not ill-appearing.   HENT:      Head: Normocephalic and atraumatic.   Eyes:      General: No scleral icterus.  Cardiovascular:      Rate and Rhythm: Normal rate and regular rhythm.      Heart sounds: Normal heart sounds. No murmur heard.  Pulmonary:      Effort: Pulmonary effort is normal. No respiratory distress.      Breath sounds: Normal breath sounds. No wheezing or rales.   Abdominal:      General: Abdomen is flat. There is no distension.      Palpations: Abdomen is soft.      Comments: Suprapubic tenderness   Musculoskeletal:         General: Normal range of motion.      Cervical back: Normal range of motion.      Right lower leg: No edema.      Left lower leg: No edema.   Skin:     Findings: Lesion present.      Comments: Left lower extremity with amputated left foot, stump is dry and intact  Diffuse, cracked erythema along the left leg  Ulcerated lesion with exposed fat and tendon on anterior aspect of right ankle   Right foot cool to touch, pulses unable to be palpated    Neurological:      General: No focal deficit present.      Mental Status: He is alert. Mental status is at baseline.   Psychiatric:      Comments: Flat affect            Significant Labs: All pertinent labs within the past 24 hours have been  reviewed.    Significant Imaging: I have reviewed all pertinent imaging results/findings within the past 24 hours.      Assessment/Plan:      * Chronic osteomyelitis  73 year old male with history of T2DM c/b neuropathy, chronic bilateral OM s/p left foot amputation and recent completion of 6 weeks IV antibiotic therapy, htn, CKD, PVD, and DVT of RUE in June 2022 presenting for worsening lower extremity wounds. He noted that his wound wrappings came loose, and he asked his wound care nurses to bring him to the ED. He endorses leg pain, otherwise no signs of systemic infection. On arrival, he is AF, WBC 8.45, lactate WNL. , CRP 50.5. Suspect progression of chronic OM likely due to diabetic nephropathy with poor wound healing due to PVD/PAD.   - Notably, he also required 6 weeks of IV daptomycin in 7/2022, as well as 6 weeks of IV vanc and ceftriaxone 3/2023. Unclear if he ever fully healed after these treatments. Previous biopsies notably positive for proteus, pseudomonas, porphyromonas, prevotella, strep agalactiae, MRSA, and E. cloacae  -Bilateral foot XR on admission with soft tissue defect along the dorsal aspect of the right midfoot, and irregularity and patchy lucency in the calcaneus, talus, and distal tibia of the left foot. MRI bilateral lower extremities pending.  - Blood cx NGTD  - ID, podiatry, and vascular surgery consulted.     Plan:  - On empiric meropenem for UTI, expect this will also provide coverage for wounds. Anticipate this to influence any future wound culture results, should that be recommended this admission   - F/u ID and podiatry  - F/u MRI  - Tylenol prn for pain       Osteomyelitis of left lower extremity  See chronic OM      Osteomyelitis of right lower extremity  See chronic OM      Nonhealing ulcer of right lower leg with fat layer exposed  See chronic OM      Hyperglycemia due to diabetes mellitus  Patient admitted with significant hyperglycemia. Most recent fingerstick glucose  reviewed-   Recent Labs   Lab 05/16/23  0114 05/16/23  0222 05/16/23  0351 05/16/23  0741   POCTGLUCOSE 238* 98 97 175*     On admission: HCO3 28, AG 10, Na 136 (corrected to 142), lactate 1.8. Not concerned for DKA given lack of acidosis, more consistent with HHS. Now off insulin gtt and on basal bolus regimen. Titrating as necessary     UTI (urinary tract infection)  UA on admission with 3+ leukocytes, > 100 WBC, many bacteria. He endorses frequency and suprapubic tenderness; no dysuria, urgency, or change in odor of his urine.  - History of ESBL UTI as recently as 2022    - Start empiric meropenem       Acute deep vein thrombosis (DVT) of right upper extremity  Noted 6/2022, due to PICC that had been in place at the time    - Continue Eliquis      PVD (peripheral vascular disease)  - See PAD      Peripheral arterial disease  MARIO 2019, right side 0.9, left side unable to be obtained. 2/2021 bilateral angiography with no targetable intervention. Has not seen vascular surgery outpatient since Jan 2021.   - Vasc sx consulted, who offered AKA. Patient declined. They have now signed off. Appreciate their help    - On asa/plavix at home. Will hold asa, continue plavix and eliquis (see DVT)  - F/u MARIO      Asymptomatic Mobitz (type) I (Wenckebach's) atrioventricular block  Patient asymptomatic. Does not follow with cardiology outpatient. HR WNL.     - Cont tele       YNES (acute kidney injury)  Creatinine 1.7 on admit, recent baseline around 1.6 since March 2023, however was normal previous to this. Will treat as YNES, though could be progression of CKD. Resolved on admission     Plan:   Lab Results   Component Value Date    CREATININE 1.3 05/16/2023           Insulin dependent type 2 diabetes mellitus  Patient's FSGs are uncontrolled due to hyperglycemia on current medication regimen.  Last A1c reviewed-   Lab Results   Component Value Date    HGBA1C 8.4 (H) 03/10/2023     Most recent fingerstick glucose reviewed-    Recent Labs   Lab 05/16/23  0114 05/16/23  0222 05/16/23  0351 05/16/23  0741   POCTGLUCOSE 238* 98 97 175*     Current correctional scale  Medium  Titrate anti-hyperglycemic dose as follows-   Antihyperglycemics (From admission, onward)    Start     Stop Route Frequency Ordered    05/16/23 2100  insulin detemir U-100 (Levemir) pen 30 Units         -- SubQ Nightly 05/16/23 0236    05/15/23 1608  insulin aspart U-100 pen 1-10 Units         -- SubQ Before meals & nightly PRN 05/15/23 1509        Hold Oral hypoglycemics while patient is in the hospital. Takes metformin and glipizide at home, as well as 68 units long acting insulin qHS. Patient reports compliance, states he does not take any meal time insulin     Essential hypertension  Home regimen: hydralazine 25mg TID, isordil 10mg TID, nifedipine 60 daily, hyzaar 100-25 daily     - continue nifedipine and isordil, add other meds as appropriate     Diabetic neuropathy  See type 2 DM  - Likely a large source of his nonhealing wounds      VTE Risk Mitigation (From admission, onward)         Ordered     apixaban tablet 5 mg  2 times daily         05/15/23 1433     IP VTE HIGH RISK PATIENT  Once         05/15/23 1431                Discharge Planning   OXANA: 5/19/2023     Code Status: Full Code   Is the patient medically ready for discharge?: No    Reason for patient still in hospital (select all that apply): Patient trending condition and Consult recommendations           Monica Mathis DO PGY1  Department of Hospital Medicine   [facility] - Intensive [facility] ([place]-[facility])

## 2023-05-16 NOTE — ASSESSMENT & PLAN NOTE
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 He noted that his wound wrappings came loose, and he asked his wound care nurses to bring him to the ED. He endorses leg pain, otherwise no signs of systemic infection. On arrival, he is AF, WBC 8.45, lactate WNL. , CRP 50.5. Suspect progression of chronic OM likely due to diabetic nephropathy with poor wound healing due to PVD/PAD.   - Notably, he also required 6 weeks of IV daptomycin in 7/2022, as well as 6 weeks of IV vanc and ceftriaxone 3/2023. Unclear if he ever fully healed after these treatments. Previous biopsies notably positive for proteus, pseudomonas, porphyromonas, prevotella, strep agalactiae, MRSA, and E. cloacae  -Bilateral foot XR on admission with soft tissue defect along the dorsal aspect of the right midfoot, and irregularity and patchy lucency in the calcaneus, talus, and distal tibia of the left foot. MRI bilateral lower extremities pending.  - Blood cx NGTD  - ID, podiatry, and vascular surgery consulted.     Plan:  - On empiric meropenem for UTI, expect this will also provide coverage for wounds. Anticipate this to influence any future wound culture results, should that be recommended this admission   - F/u ID and podiatry  - F/u MRI  - Tylenol prn for pain

## 2023-05-16 NOTE — ASSESSMENT & PLAN NOTE
Patient admitted with significant hyperglycemia. Most recent fingerstick glucose reviewed-   Recent Labs   Lab 05/16/23  0114 05/16/23  0222 05/16/23  0351 05/16/23  0741   POCTGLUCOSE 238* 98 97 175*     On admission: HCO3 28, AG 10, Na 136 (corrected to 142), lactate 1.8. Not concerned for DKA given lack of acidosis, more consistent with HHS. Now off insulin gtt and on basal bolus regimen. Titrating as necessary

## 2023-05-16 NOTE — PLAN OF CARE
[facility] - Intensive Care ([place] [address])  Initial Discharge Assessment       Primary Care Provider: [facility]    Admission Diagnosis: Peripheral vascular disease [I73.9]  Wound cellulitis [L03.90]  Chest pain [R07.9]    Admission Date: 5/15/2023  Expected Discharge Date: 5/19/2023    Transition of Care Barriers: Other (see comments) (resides with sister and sister is requesting help)    Payor: [employer] MEDICARE / Plan: [employer] HMO PPO SPECIAL NEEDS / Product Type: Medicare Advantage /     Extended Emergency Contact Information  Primary Emergency Contact: Kandy Castañeda   [place]  Mobile Phone: [phone]  Relation: Sister  Secondary Emergency Contact: January Nicholson   [place]  Mobile Phone: [phone]  Relation: Sister    Discharge Plan A: Long-term acute care facility (LTAC)  Discharge Plan B: New Nursing Home placement - [facility] care facility      [id number]  [facility] [place] [address]  Phone: [phone] Fax: [phone]      Initial Assessment (most recent)       Adult Discharge Assessment - 05/16/23 1337          Discharge Assessment    Assessment Type Discharge Planning Assessment     Confirmed/corrected address, phone number and insurance Yes     Source of Information family   Kandy Castañeda (Sister)   [phone] (Mobile)    When was your last doctors appointment? --   2/2023    Reason For Admission Chronic osteomyelitis     People in Home sibling(s)     Do you expect to return to your current living situation? Yes     Do you have help at home or someone to help you manage your care at home? No     Prior to hospitilization cognitive status: Alert/Oriented     Current cognitive status: Alert/Oriented     Home Accessibility stairs to enter home     Number of Stairs, Main Entrance six     Stair Railings, Main Entrance railings safe  and in good condition     Equipment Currently Used at Home wheelchair;glucometer     Readmission within 30 days? No     Patient currently being followed by outpatient case management? No   will refer    Do you currently have service(s) that help you manage your care at home? No     Do you take prescription medications? Yes     Do you have prescription coverage? Yes     Coverage [employer] MANA[employer]ED MEDICARE - [employer] [facility] HMO PPO SPECIAL NEEDS     Do you have any problems affording any of your prescribed medications? No     Is the patient taking medications as prescribed? yes     Who is going to help you get home at discharge? Kandy Castañeda (Sister)   [phone] (Mobile)   but states she needs help d/t stroke    How do you get to doctors appointments? health plan transportation     Are you on dialysis? No     Do you take coumadin? No     Discharge Plan A Long-term acute care facility (LTAC)     Discharge Plan B New Nursing Home placement - senior living care facility     Discharge Plan discussed with: Sibling     Name(s) and Number(s) Kandy Castañeda (Sister)   [phone] (Mobile)   Bob Nicholson [phone]    Transition of Care Barriers Other (see comments)   resides with sister and sister is requesting help       Physical Activity    On average, how many days per week do you engage in moderate to strenuous exercise (like a brisk walk)? 0 days     On average, how many minutes do you engage in exercise at this level? 10 min        Financial Resource Strain    How hard is it for you to pay for the very basics like food, housing, medical care, and heating? Not very hard        Housing Stability    In the last 12 months, was there a time when you were not able to pay the mortgage or rent on time? No     In the last 12 months, was there a time when you did not have a steady place to sleep or slept in a shelter (including now)? No        Transportation Needs    In the past 12 months, has lack of transportation kept you  "from medical appointments or from getting medications? No     In the past 12 months, has lack of transportation kept you from meetings, work, or from getting things needed for daily living? No        Food Insecurity    Within the past 12 months, you worried that your food would run out before you got the money to buy more. Never true     Within the past 12 months, the food you bought just didn't last and you didn't have money to get more. Never true        Stress    Do you feel stress - tense, restless, nervous, or anxious, or unable to sleep at night because your mind is troubled all the time - these days? Rather much        Social Connections    In a typical week, how many times do you talk on the phone with family, friends, or neighbors? Never     How often do you get together with friends or relatives? Never     How often do you attend Baptist or Orthodox services? Never     How often do you attend meetings of the clubs or organizations you belong to? Never     Are you , , , , never , or living with a partner? Never         Alcohol Use    Q1: How often do you have a drink containing alcohol? Patient refused     Q2: How many drinks containing alcohol do you have on a typical day when you are drinking? Patient refused     Q3: How often do you have six or more drinks on one occasion? Patient refused        OTHER    Name(s) of People in Home Kandy Castañeda (Sister)   [phone] (Mobile)                      Cm spoke to sister, Kandy Castañeda (Sister) [phone] (Mobile) and patient resides with sister. Patients resides in a house with sister and with 6 LON.  Patient has a w/c for mobility and patients sister states, " I am not able to take care of him, he needs a bath and I had a stroke and I am not able."  Patient is not HD and is on a BT: Eliquis 5 mg BID.  Patients pharmacy is [facility] #[id number].  Patient sister stated. " [facility], if " "they can deliver it to the bedside."  Patient diop not drive and patients uses health  plan transportation.  Patient last HH services were with [facility] back in 3/15/23. Patients sister states the patient maybe with [facility].    CM sent an e-mail to Jackelyn KATHLEEN with [facility] and waiting on a response.    Patient may benefit from LTAC service  vs NH placement.  [facility] refuses NH placement.    Will refer to OP LEW Alex RN  Case Management  [facility]  [phone]              "

## 2023-05-16 NOTE — ASSESSMENT & PLAN NOTE
IDSA moderate diabetic foot infection with bilateral full-thickness ulcerations of the lower extremity and osteomyelitis. Right foot MRI with findings concerning for early osteomyelitis of the navicular and medial cuneiform.  Left foot MRI indicates posterior calcaneal osteomyelitis.  Right foot x-ray with chronic changes in the 5th metatarsal.  Left foot x-ray with potential solitary bubble of soft tissue emphysema just inferior to the calcaneus.  ABIs were ordered and deemed unreliable due to noncompressible calcified vessels.  Bilateral lower extremity ultrasound without evidence for vascular occlusion or hemodynamically significant stenosis of the right or left lower extremity arterial system.  Vascular surgery assess the patient and deemed that he was not a revascularization candidate due to nonambulatory status, noting patient can no longer heel BKA is now recommending bilateral AKA's.  Vascular surgery notes the patient is not amenable.  When I spoke with patient he seemed willing to consider the possibility. Discussed with hospital medicine and Dr. Barlow, and decided to transfer patient to [facility] for interventional cardiology workup      Patient to be transferred to [facility] facility for interventional cardiology  Right anterior ankle wound cultures obtained in addition to left plantar wound cultures as well  Santyl enzymatic wound debridment ontment ordered   Hydrafera blue ready ordered   Heel offloading boots ordered, to be wore at all times   Wounds cleansed and dressed per podiatry   Excisional wound debridment of right anterior ankle, necrosing tendon exposed was excsied as serves for path for infection   Antibiotics per primary/ID   Nursing wound care routine ordered  Podiatry will follow

## 2023-05-17 LAB
ANION GAP SERPL CALC-SCNC: 11 MMOL/L (ref 8–16)
ANION GAP SERPL CALC-SCNC: 6 MMOL/L (ref 8–16)
ANION GAP SERPL CALC-SCNC: 8 MMOL/L (ref 8–16)
ANION GAP SERPL CALC-SCNC: 9 MMOL/L (ref 8–16)
BACTERIA UR CULT: ABNORMAL
BASOPHILS # BLD AUTO: 0.03 K/UL (ref 0–0.2)
BASOPHILS NFR BLD: 0.5 % (ref 0–1.9)
BUN SERPL-MCNC: 25 MG/DL (ref 8–23)
BUN SERPL-MCNC: 26 MG/DL (ref 8–23)
BUN SERPL-MCNC: 29 MG/DL (ref 8–23)
BUN SERPL-MCNC: 29 MG/DL (ref 8–23)
CALCIUM SERPL-MCNC: 9.1 MG/DL (ref 8.7–10.5)
CALCIUM SERPL-MCNC: 9.2 MG/DL (ref 8.7–10.5)
CALCIUM SERPL-MCNC: 9.4 MG/DL (ref 8.7–10.5)
CHLORIDE SERPL-SCNC: 101 MMOL/L (ref 95–110)
CHLORIDE SERPL-SCNC: 102 MMOL/L (ref 95–110)
CHLORIDE SERPL-SCNC: 103 MMOL/L (ref 95–110)
CHLORIDE SERPL-SCNC: 104 MMOL/L (ref 95–110)
CO2 SERPL-SCNC: 26 MMOL/L (ref 23–29)
CO2 SERPL-SCNC: 28 MMOL/L (ref 23–29)
CO2 SERPL-SCNC: 30 MMOL/L (ref 23–29)
CREAT SERPL-MCNC: 1.4 MG/DL (ref 0.5–1.4)
CREAT SERPL-MCNC: 1.5 MG/DL (ref 0.5–1.4)
CREAT SERPL-MCNC: 1.6 MG/DL (ref 0.5–1.4)
CREAT SERPL-MCNC: 1.6 MG/DL (ref 0.5–1.4)
DIFFERENTIAL METHOD: ABNORMAL
EOSINOPHIL # BLD AUTO: 0.2 K/UL (ref 0–0.5)
EOSINOPHIL NFR BLD: 3.8 % (ref 0–8)
ERYTHROCYTE [DISTWIDTH] IN BLOOD BY AUTOMATED COUNT: 17.1 % (ref 11.5–14.5)
EST. GFR  (NO RACE VARIABLE): 44.9 ML/MIN/1.73 M^2
EST. GFR  (NO RACE VARIABLE): 44.9 ML/MIN/1.73 M^2
EST. GFR  (NO RACE VARIABLE): 48.6 ML/MIN/1.73 M^2
EST. GFR  (NO RACE VARIABLE): 52.7 ML/MIN/1.73 M^2
GLUCOSE SERPL-MCNC: 201 MG/DL (ref 70–110)
GLUCOSE SERPL-MCNC: 299 MG/DL (ref 70–110)
GLUCOSE SERPL-MCNC: 301 MG/DL (ref 70–110)
GLUCOSE SERPL-MCNC: 302 MG/DL (ref 70–110)
GLUCOSE SERPL-MCNC: 347 MG/DL (ref 70–110)
GLUCOSE SERPL-MCNC: 354 MG/DL (ref 70–110)
HCT VFR BLD AUTO: 27.1 % (ref 40–54)
HGB BLD-MCNC: 8.3 G/DL (ref 14–18)
IMM GRANULOCYTES # BLD AUTO: 0.02 K/UL (ref 0–0.04)
IMM GRANULOCYTES NFR BLD AUTO: 0.3 % (ref 0–0.5)
LYMPHOCYTES # BLD AUTO: 1.6 K/UL (ref 1–4.8)
LYMPHOCYTES NFR BLD: 24.8 % (ref 18–48)
MAGNESIUM SERPL-MCNC: 1.9 MG/DL (ref 1.6–2.6)
MCH RBC QN AUTO: 26.3 PG (ref 27–31)
MCHC RBC AUTO-ENTMCNC: 30.6 G/DL (ref 32–36)
MCV RBC AUTO: 86 FL (ref 82–98)
MONOCYTES # BLD AUTO: 0.8 K/UL (ref 0.3–1)
MONOCYTES NFR BLD: 11.9 % (ref 4–15)
NEUTROPHILS # BLD AUTO: 3.7 K/UL (ref 1.8–7.7)
NEUTROPHILS NFR BLD: 58.7 % (ref 38–73)
NRBC BLD-RTO: 0 /100 WBC
PHOSPHATE SERPL-MCNC: 3.6 MG/DL (ref 2.7–4.5)
PLATELET # BLD AUTO: 309 K/UL (ref 150–450)
PMV BLD AUTO: 10.4 FL (ref 9.2–12.9)
POCT GLUCOSE: 201 MG/DL (ref 70–110)
POCT GLUCOSE: 286 MG/DL (ref 70–110)
POCT GLUCOSE: 317 MG/DL (ref 70–110)
POCT GLUCOSE: 318 MG/DL (ref 70–110)
POTASSIUM SERPL-SCNC: 4.2 MMOL/L (ref 3.5–5.1)
POTASSIUM SERPL-SCNC: 4.3 MMOL/L (ref 3.5–5.1)
POTASSIUM SERPL-SCNC: 4.4 MMOL/L (ref 3.5–5.1)
POTASSIUM SERPL-SCNC: 4.5 MMOL/L (ref 3.5–5.1)
RBC # BLD AUTO: 3.15 M/UL (ref 4.6–6.2)
SODIUM SERPL-SCNC: 138 MMOL/L (ref 136–145)
SODIUM SERPL-SCNC: 138 MMOL/L (ref 136–145)
SODIUM SERPL-SCNC: 139 MMOL/L (ref 136–145)
SODIUM SERPL-SCNC: 139 MMOL/L (ref 136–145)
SODIUM SERPL-SCNC: 140 MMOL/L (ref 136–145)
SODIUM SERPL-SCNC: 140 MMOL/L (ref 136–145)
WBC # BLD AUTO: 6.32 K/UL (ref 3.9–12.7)

## 2023-05-17 PROCEDURE — 99232 SBSQ HOSP IP/OBS MODERATE 35: CPT | Mod: GC,,, | Performed by: STUDENT IN AN ORGANIZED HEALTH CARE EDUCATION/TRAINING PROGRAM

## 2023-05-17 PROCEDURE — 25000003 PHARM REV CODE 250

## 2023-05-17 PROCEDURE — 63600175 PHARM REV CODE 636 W HCPCS

## 2023-05-17 PROCEDURE — 83735 ASSAY OF MAGNESIUM: CPT

## 2023-05-17 PROCEDURE — 84100 ASSAY OF PHOSPHORUS: CPT

## 2023-05-17 PROCEDURE — 85025 COMPLETE CBC W/AUTO DIFF WBC: CPT

## 2023-05-17 PROCEDURE — 25000003 PHARM REV CODE 250: Performed by: STUDENT IN AN ORGANIZED HEALTH CARE EDUCATION/TRAINING PROGRAM

## 2023-05-17 PROCEDURE — 99233 SBSQ HOSP IP/OBS HIGH 50: CPT | Mod: ,,, | Performed by: PODIATRIST

## 2023-05-17 PROCEDURE — 99233 PR SUBSEQUENT HOSPITAL CARE,LEVL III: ICD-10-PCS | Mod: ,,, | Performed by: PODIATRIST

## 2023-05-17 PROCEDURE — 80048 BASIC METABOLIC PNL TOTAL CA: CPT | Mod: 91

## 2023-05-17 PROCEDURE — 99232 PR SUBSEQUENT HOSPITAL CARE,LEVL II: ICD-10-PCS | Mod: GC,,, | Performed by: STUDENT IN AN ORGANIZED HEALTH CARE EDUCATION/TRAINING PROGRAM

## 2023-05-17 PROCEDURE — 20600001 HC STEP DOWN PRIVATE ROOM

## 2023-05-17 PROCEDURE — 36415 COLL VENOUS BLD VENIPUNCTURE: CPT

## 2023-05-17 RX ORDER — INSULIN ASPART 100 [IU]/ML
8 INJECTION, SOLUTION INTRAVENOUS; SUBCUTANEOUS
Status: DISCONTINUED | OUTPATIENT
Start: 2023-05-17 | End: 2023-05-18

## 2023-05-17 RX ORDER — AMOXICILLIN 250 MG
1 CAPSULE ORAL 2 TIMES DAILY
Status: CANCELLED | OUTPATIENT
Start: 2023-05-17

## 2023-05-17 RX ORDER — POLYETHYLENE GLYCOL 3350 17 G/17G
17 POWDER, FOR SOLUTION ORAL 2 TIMES DAILY
Status: CANCELLED | OUTPATIENT
Start: 2023-05-17

## 2023-05-17 RX ADMIN — COLLAGENASE SANTYL: 250 OINTMENT TOPICAL at 10:05

## 2023-05-17 RX ADMIN — INSULIN ASPART 8 UNITS: 100 INJECTION, SOLUTION INTRAVENOUS; SUBCUTANEOUS at 01:05

## 2023-05-17 RX ADMIN — ISOSORBIDE DINITRATE 10 MG: 10 TABLET ORAL at 08:05

## 2023-05-17 RX ADMIN — INSULIN DETEMIR 30 UNITS: 100 INJECTION, SOLUTION SUBCUTANEOUS at 08:05

## 2023-05-17 RX ADMIN — ACETAMINOPHEN 650 MG: 325 TABLET ORAL at 10:05

## 2023-05-17 RX ADMIN — APIXABAN 5 MG: 5 TABLET, FILM COATED ORAL at 08:05

## 2023-05-17 RX ADMIN — MEROPENEM 1 G: 1 INJECTION INTRAVENOUS at 08:05

## 2023-05-17 RX ADMIN — ISOSORBIDE DINITRATE 10 MG: 10 TABLET ORAL at 04:05

## 2023-05-17 RX ADMIN — INSULIN ASPART 8 UNITS: 100 INJECTION, SOLUTION INTRAVENOUS; SUBCUTANEOUS at 04:05

## 2023-05-17 RX ADMIN — INSULIN ASPART 8 UNITS: 100 INJECTION, SOLUTION INTRAVENOUS; SUBCUTANEOUS at 08:05

## 2023-05-17 RX ADMIN — NIFEDIPINE 60 MG: 30 TABLET, FILM COATED, EXTENDED RELEASE ORAL at 08:05

## 2023-05-17 RX ADMIN — TAMSULOSIN HYDROCHLORIDE 0.4 MG: 0.4 CAPSULE ORAL at 08:05

## 2023-05-17 RX ADMIN — OXYCODONE HYDROCHLORIDE 5 MG: 5 TABLET ORAL at 12:05

## 2023-05-17 RX ADMIN — OXYCODONE HYDROCHLORIDE 5 MG: 5 TABLET ORAL at 05:05

## 2023-05-17 RX ADMIN — PANTOPRAZOLE SODIUM 40 MG: 40 TABLET, DELAYED RELEASE ORAL at 08:05

## 2023-05-17 RX ADMIN — MUPIROCIN: 20 OINTMENT TOPICAL at 08:05

## 2023-05-17 RX ADMIN — INSULIN ASPART 6 UNITS: 100 INJECTION, SOLUTION INTRAVENOUS; SUBCUTANEOUS at 04:05

## 2023-05-17 RX ADMIN — MEROPENEM 1 G: 1 INJECTION INTRAVENOUS at 06:05

## 2023-05-17 RX ADMIN — CLOPIDOGREL BISULFATE 75 MG: 75 TABLET ORAL at 08:05

## 2023-05-17 RX ADMIN — MEROPENEM 1 G: 1 INJECTION INTRAVENOUS at 12:05

## 2023-05-17 RX ADMIN — ATORVASTATIN CALCIUM 80 MG: 40 TABLET, FILM COATED ORAL at 08:05

## 2023-05-17 NOTE — NURSING
Nurses Note -- 4 Eyes      5/16/2023   7:06 PM      Skin assessed during: Admit      [] No Altered Skin Integrity Present    []Prevention Measures Documented      [x] Yes- Altered Skin Integrity Present or Discovered   [] LDA Added if Not in Epic (Describe Wound)   [x] New Altered Skin Integrity was Present on Admit and Documented in LDA   [] Wound Image Taken    Wound Care Consulted? Yes    Attending Nurse:  Con Nguyễn RN     Second RN/Staff Member:  BEREKET Velez

## 2023-05-17 NOTE — SUBJECTIVE & OBJECTIVE
Interval History: Continuing IV Meropenem to treat UTI; awaiting speciation. Spoke with patient regarding grim prognosis with continuing IV antibiotics to treat Osteomyelitis- patient voiced understanding and reported that he would speak with his sister about whether or not to pursue a bilateral AKA.     Review of Systems   Constitutional:  Negative for fatigue and fever.   HENT:  Negative for congestion.    Eyes:  Negative for visual disturbance.   Respiratory:  Negative for chest tightness, shortness of breath and wheezing.    Cardiovascular:  Negative for chest pain and palpitations.   Gastrointestinal:  Negative for abdominal pain, constipation, diarrhea, rectal pain and vomiting.   Genitourinary:  Positive for frequency. Negative for difficulty urinating.   Musculoskeletal:  Positive for gait problem. Negative for back pain.        Bilateral leg pain   Skin:  Positive for wound.   Neurological:  Negative for dizziness and headaches.   Hematological:  Does not bruise/bleed easily.   Psychiatric/Behavioral:  Negative for confusion.    Objective:     Vital Signs (Most Recent):  Temp: 97.9 °F (36.6 °C) (05/17/23 1541)  Pulse: (!) 58 (05/17/23 1541)  Resp: 20 (05/17/23 1541)  BP: (!) 149/64 (05/17/23 1541)  SpO2: 98 % (05/17/23 1541) Vital Signs (24h Range):  Temp:  [97.2 °F (36.2 °C)-98.3 °F (36.8 °C)] 97.9 °F (36.6 °C)  Pulse:  [58-66] 58  Resp:  [18-20] 20  SpO2:  [94 %-98 %] 98 %  BP: (130-165)/(64-72) 149/64     Weight: 110.5 kg (243 lb 9.7 oz)  Body mass index is 35.97 kg/m².  No intake or output data in the 24 hours ending 05/17/23 1641      Physical Exam  Vitals and nursing note reviewed.   Constitutional:       General: He is not in acute distress.     Appearance: Normal appearance. He is not ill-appearing.   HENT:      Head: Normocephalic and atraumatic.   Eyes:      General: No scleral icterus.  Cardiovascular:      Rate and Rhythm: Normal rate and regular rhythm.      Heart sounds: Normal heart sounds.  No murmur heard.  Pulmonary:      Effort: Pulmonary effort is normal. No respiratory distress.      Breath sounds: Normal breath sounds. No wheezing or rales.   Abdominal:      General: Abdomen is flat. There is no distension.      Palpations: Abdomen is soft.      Comments: Suprapubic tenderness   Musculoskeletal:         General: Normal range of motion.      Cervical back: Normal range of motion.      Right lower leg: No edema.      Left lower leg: No edema.   Skin:     Findings: Lesion present.      Comments: Left lower extremity with amputated left foot, stump is dry and intact  Diffuse, cracked erythema along the left leg  Ulcerated lesion with exposed fat and tendon on anterior aspect of right ankle   Right foot cool to touch, pulses unable to be palpated    Neurological:      General: No focal deficit present.      Mental Status: He is alert. Mental status is at baseline.   Psychiatric:      Comments: Flat affect            Significant Labs: All pertinent labs within the past 24 hours have been reviewed.    Significant Imaging: I have reviewed all pertinent imaging results/findings within the past 24 hours.

## 2023-05-17 NOTE — ASSESSMENT & PLAN NOTE
MARIO 2019, right side 0.9, left side unable to be obtained. 2/2021 bilateral angiography with no targetable intervention. Has not seen vascular surgery outpatient since Jan 2021.   - Vasc sx consulted, who offered AKA. Patient declined. They have now signed off. Appreciate their help    - On asa/plavix at home. Will hold asa, continue plavix and eliquis (see DVT)  - F/u MARIO  - Possible Vascular Surgery consult if patient agrees to proceed with AKA

## 2023-05-17 NOTE — PLAN OF CARE
Problem: Adult Inpatient Plan of Care  Goal: Plan of Care Review  Outcome: Ongoing, Progressing  Goal: Patient-Specific Goal (Individualized)  Outcome: Ongoing, Progressing  Goal: Absence of Hospital-Acquired Illness or Injury  Outcome: Ongoing, Progressing  Goal: Optimal Comfort and Wellbeing  Outcome: Ongoing, Progressing  Goal: Readiness for Transition of Care  Outcome: Ongoing, Progressing       Patient resting in bed with some pain, pain meds given.  Wound care done to bilateral lower extremities by MD.  AAOx4, bed alarm on with call light in reach.

## 2023-05-17 NOTE — PLAN OF CARE
Problem: Adult Inpatient Plan of Care  Goal: Plan of Care Review  Outcome: Ongoing, Progressing  Goal: Patient-Specific Goal (Individualized)  Outcome: Ongoing, Progressing  Goal: Absence of Hospital-Acquired Illness or Injury  Outcome: Ongoing, Progressing  Goal: Optimal Comfort and Wellbeing  Outcome: Ongoing, Progressing  Goal: Readiness for Transition of Care  Outcome: Ongoing, Progressing     Problem: Impaired Wound Healing  Goal: Optimal Wound Healing  Outcome: Ongoing, Progressing     Problem: Fluid and Electrolyte Imbalance (Acute Kidney Injury/Impairment)  Goal: Fluid and Electrolyte Balance  Outcome: Ongoing, Progressing     Problem: Oral Intake Inadequate (Acute Kidney Injury/Impairment)  Goal: Optimal Nutrition Intake  Outcome: Ongoing, Progressing

## 2023-05-17 NOTE — PROVIDER TRANSFER
(Physician in Lead of Transfers)   Outside Transfer Acceptance Note / Regional Referral Center    Referring facility: [facility]  Referring provider: COLEEN DIOP  Accepting facility: [facility]  Reason for transfer:  Higher level of care  Transfer diagnosis: chronic bilateral OM   Transfer specialty requested: Interventional Cardiology  Transfer specialty notified: Yes  Transfer level: 2  Isolation status: No active isolations   Admission class or status: IP- Inpatient    Narrative     Patient is a 73 y.o. male who has a past medical history of  T2DM c/b neuropathy, chronic bilateral OM s/p left foot amputation, htn, CKD, PVD, DVT of RUE in June 2022, and asymptomatic Mobitz Type 1 AV block presenting for worsening lower extremity wounds. Patient has chronic lower extremity osteomyelitis and non healing wounds. ID consulted and patient being treated with empiric meropenem, however there is very poor blood flow to the wounds, so do not expect much healing to result from IV antibiotics. Vascular surgery consulted who recommended AKA however patient refused. Facility seeking transfer for interventional cardiology consult for possible revascularization. Interventional cardiology connected to referring and has agreed to consult. Stable for transfer. Transfer cancelled last night (patient refused) but now is agreeable     Objective     Vitals: Temp: 97.9 °F (36.6 °C) (05/17/23 1541)  Pulse: (!) 58 (05/17/23 1541)  Resp: 20 (05/17/23 1759)  BP: (!) 149/64 (05/17/23 1541)  SpO2: 98 % (05/17/23 1541)    Recent Labs: see EPIC  CBC:  Recent Labs   Lab 05/17/23  0445   WBC 6.32   HGB 8.3*   HCT 27.1*        CMP:  Recent Labs   Lab 05/17/23  1529   CALCIUM 9.1      K 4.2   CO2 28      BUN 26*   CREATININE 1.6*     Recent Labs   Lab 05/15/23  1058   LACTATE 1.8     No results for input(s): CPK, CPKMB, TROPONINI, MB in the last 168 hours.  BNP  No results for input(s): BNP,  BNPTRIAGEBLO in the last 168 hours.  ABG  No results for input(s): PH, PO2, PCO2, HCO3, BE in the last 168 hours.   Lab Results   Component Value Date    INR 1.1 09/18/2022    INR 1.0 02/20/2014    INR 1.4 (H) 02/19/2014       Recent imaging:   Ankle Brachial Indices (MARIO)  · MARIO unreliable due to non-compressible vessels.     MRI Foot Toes W WO Contrast Right  Narrative: EXAMINATION:  MRI FOOT TOES W WO CONTRAST RIGHT    CLINICAL HISTORY:  Osteomyelitis, foot;    TECHNIQUE:  Multiplanar, multisequence MR imaging of the right forefoot was obtained after the administration of 10 cc Gadavist intravenous contrast.    COMPARISON:  MRI ankle 03/09/2023, foot radiograph 05/15/2023    FINDINGS:  No acute fracture.  Chronic deformity of the 5th metatarsal likely sequelae of prior trauma.  Skin ulcer at the dorsal midfoot overlying the naviculocuneiform joints.  Subtle diminished T1 signal at the dorsum of the navicular and medial cuneiform with marrow edema and cortical indistinctness (sagittal T1 series 3, image 22) concerning for osteomyelitis.  No organized fluid collection.    Mild degenerative change of the midfoot. Hallux-sesamoid complex is unremarkable.  No joint effusions.    Flexor and extensor tendons of the toes demonstrate normal signal characteristics.  No tenosynovitis.    Lisfranc ligament is intact.  No acute ligamentous injury.    Evidence of diabetes related chronic denervation with severe atrophy of the intrinsic foot musculature.  Dorsal subcutaneous soft tissue edema.  No Contreras's neuroma.  Impression: 1. Dorsal midfoot ulcer with findings at the underlying navicular medial cuneiform joint concerning for early osteomyelitis.  2. Additional findings, as above.    Electronically signed by resident: Kaleb Marc MD  Date:    05/16/2023  Time:    13:23    Electronically signed by: Sukumar Krause MD  Date:    05/16/2023  Time:    14:05  MRI Ankle W WO Contrast Left  Narrative: EXAMINATION:  MRI ANKLE W  WO CONTRAST LEFT    CLINICAL HISTORY:  Osteomyelitis, ankle;    TECHNIQUE:  Multiplanar, multisequence MR imaging of the left ankle was obtained before and after the administration of 10 cc Gadavist intravenous contrast.  Examination is significantly degraded by patient motion artifact.    COMPARISON:  Foot radiograph 05/15/2023, MRI left hindfoot 03/10/2023    FINDINGS:  Operative change of mid tarsal amputation.  Large soft tissue ulcer overlying the calcaneus with confluent low T1 signal, marrow edema, and indistinctness of the posterior calcaneal cortex consistent with osteomyelitis.  Findings are similar to recent prior above comparison MRI.  No discrete marrow signal change of the distal tibia, fibula, or talus.  Tibiotalar and subtalar joint spaces are grossly maintained.    Additional skin defect at the anteromedial midfoot near the resection stump, though evaluation of the underlying bone is again degraded by motion.  No focal subcutaneous fluid collection.    Fusiform thickening and intermediate signal of the distal Achilles tendon.  Thickening of the distal peroneus brevis tendon.  Ligaments are poorly evaluated.  Extensive subcutaneous soft tissue edema and skin thickening.  Impression: 1. Posterior calcaneal osteomyelitis with large overlying skin ulcer.  2. Additional stable findings detailed above.    Electronically signed by resident: Kaleb Marc MD  Date:    05/16/2023  Time:    13:10    Electronically signed by: Sukumar Krause MD  Date:    05/16/2023  Time:    13:42  US Retroperitoneal Complete  Narrative: EXAMINATION:  US RETROPERITONEAL COMPLETE    CLINICAL HISTORY:  YNES;    TECHNIQUE:  Ultrasound of the kidneys and urinary bladder was performed including color flow and Doppler evaluation of the kidneys.    COMPARISON:  CTA runoff 06/16/2022    FINDINGS:  Right kidney: The right kidney measures 10.3 cm.  No cortical thinning.  No loss of corticomedullary distinction.  Resistive index measures  0.73.  Decreased perfusion.  No solid mass. No shadowing renal stone. No hydronephrosis.    Left kidney: The left kidney measures 12.4 cm.  No cortical thinning. No loss of corticomedullary distinction.   Resistive index measures 0.76.  Decreased perfusion.  Simple cyst within the upper pole measuring 2.1 x 1.8 x 1.3 cm.  No solid mass. No shadowing renal stone. No hydronephrosis.    The bladder is distended without significant abnormality.    Splenic resistive index: 0.65.  Impression: Slightly elevated renal arterial resistive indices, nonspecific, but can be seen in medical renal disease.    No hydronephrosis.    Electronically signed by resident: Seamus Fernandes  Date:    05/16/2023  Time:    02:57    Electronically signed by: Ish Haro  Date:    05/16/2023  Time:    06:07      Airway:     Vent settings:         IV access:        Peripheral IV - Single Lumen 05/15/23 1135 20 G Right Antecubital (Active)   Site Assessment Clean;Dry;Intact 05/17/23 1500   Extremity Assessment Distal to IV No abnormal discoloration 05/17/23 0800   Line Status Flushed 05/17/23 0800   Dressing Status Clean;Dry;Intact 05/17/23 1500   Dressing Intervention Integrity maintained 05/16/23 2000            Peripheral IV - Single Lumen 05/16/23 0000 22 G Anterior;Left Forearm (Active)   Site Assessment Clean;Dry;Intact 05/17/23 1500   Extremity Assessment Distal to IV No abnormal discoloration 05/17/23 0800   Line Status Flushed 05/17/23 0800   Dressing Status Clean;Dry;Intact 05/17/23 1500   Dressing Intervention Integrity maintained 05/16/23 2000     Allergies: Review of patient's allergies indicates:  No Known Allergies   NPO: No    Anticoagulation:   Anticoagulants       Ordered     Route Frequency Start Stop    05/15/23 1433  apixaban         Oral 2 times daily 05/15/23 2100 --             Instructions      [facility]  Admit to Hospital Medicine  Upon patient arrival to floor, please contact Hospital Medicine on call.

## 2023-05-17 NOTE — PROCEDURES
"Saji Castañeda is a 73 y.o. male patient.    Temp: 98.3 °F (36.8 °C) (05/16/23 2032)  Pulse: 66 (05/16/23 2032)  Resp: 18 (05/16/23 2032)  BP: 130/65 (05/16/23 2032)  SpO2: 96 % (05/16/23 2032)  Weight: 110.5 kg (243 lb 9.7 oz) (05/15/23 1846)       Debridement    Date/Time: 5/16/2023 2:17 PM  Performed by: Ronaldo Duff DPM  Authorized by: Toribio Wilson DPM     Time out: Immediately prior to procedure a "time out" was called to verify the correct patient, procedure, equipment, support staff and site/side marked as required.    Consent Done?:  Yes (Verbal)    Preparation: Patient was prepped and draped with clean technique    Local anesthesia used?: No      Wound Details:    Location:  Right foot    Location:  Right Ankle    Type of Debridement:  Excisional       Length (cm):  3.5       Area (sq cm):  8.75       Width (cm):  2.5       Percent Debrided (%):  100       Depth (cm):  1.5       Total Area Debrided (sq cm):  8.75    Depth of debridement:  Muscle/fascia/tendon    Tissue debrided:  Adipose, Dermis, Epidermis, Subcutaneous and Tendon    Devitalized tissue debrided:  Biofilm, Callus, Exudate, Fibrin and Slough    Instruments:  Curette  Bleeding:  Minimal  Hemostasis Achieved: Yes  Method Used:  Pressure  Patient tolerance:  Patient tolerated the procedure well with no immediate complications  1st Wound Pain Assessment: 0  Specimen Collected: Specimen sent to microbiology    5/16/2023          Ronaldo Duff DPM, PGY-2  Podiatry / Foot and Ankle Surgery   Page # [phone]  Secure chat preferred     "

## 2023-05-17 NOTE — PROGRESS NOTES
"[facility] ([address] Medicine  Progress Note    Patient Name: Saji Castañeda  MRN: [id number]  Patient Class: IP- Inpatient   Admission Date: 5/15/2023  Length of Stay: 2 days  Attending Physician: Jm Rosa MD  Primary Care Provider: [facility]        Subjective:     Principal Problem:Chronic osteomyelitis        HPI:  73 year old male with history of T2DM c/b neuropathy, chronic bilateral OM s/p left foot amputation, htn, CKD, PVD, DVT of RUE in June 2022, and asymptomatic Mobitz Type 1 AV block presenting for worsening lower extremity wounds. He has had these wound for years, and follows with [facility]. He recently presented 3/2023 for BLE cellulitis, found to have subacute OM of the left foot, and acute OM of the right ankle. He was treated with IV Vanc and Ceftriaxone as well as po flagyl for six weeks, and this was recently completed. During that admission, surgery was consulted for bilateral BKA, which has been discussed several times. He declined.   Wound care comes to his house where he lives with three roommates twice a week, every Monday and Friday. He feels that the wounds are not being adequately wrapped. Today he comes in because the wound wrappings have come loose and he asked his wound care nurses to bring him to the hospital. He does not notice change in appearance to the wounds. There is constant drainage, however has not increased recently. He otherwise does not care for his wounds. He has a shuttle service take him to wound care appointments, however he has had difficulty with transportation recently, thus causing him to miss appointments. He states he can stand on his legs "a little"; he can walk to his front door with a walker. One episode of diarrhea last week, otherwise has been well and without fevers. He has never smoked, does not drink alcohol, does not utilize illicit drugs.     On arrival to [facility], he has a temp of 100, HR 95, " /80, on RA. WBC 8.45, Hgb 9.2 (at baseline), plts 406. CMP pending. . CRP pending. Lactate 1.8. Foot XR wtih soft tissue defect along the dorsal aspect of the right midfoot, and irregularity and patchy lucency in the calcaneus, talus, and distal tibia of the left foot. MRI bilateral lower extremities pending. ID, podiatry, and vascular surgery consulted. He has been admitted to hospital medicine for management of bilateral LE OM.       Overview/Hospital Course:  Patient admitted to hospital medicine for management of progression of chronic lower extremity osteomyelitis and non healing wounds. Vascular surgery consulted, who recommended AKA. Patient refused. ID consulted. Wound care consulted. Bilateral LE MRI pending. Blood cultures NGTD. Glucose resolved with insulin gtt, transitioned to basal bolus. UA notable for UTI, patient also endorsing frequency and suprapubic tenderness. Has history of ESBL E coli UTI, initiated on empiric meropenem. Imaging showed recurrence of acute osteomyelitis, however due to poor peripheral perfusion, IV antibiotics will not have enough penetration to heal wounds.       Interval History: Continuing IV Meropenem to treat UTI; awaiting speciation. Spoke with patient regarding grim prognosis with continuing IV antibiotics to treat Osteomyelitis- patient voiced understanding and reported that he would speak with his sister about whether or not to pursue a bilateral AKA.     Review of Systems   Constitutional:  Negative for fatigue and fever.   HENT:  Negative for congestion.    Eyes:  Negative for visual disturbance.   Respiratory:  Negative for chest tightness, shortness of breath and wheezing.    Cardiovascular:  Negative for chest pain and palpitations.   Gastrointestinal:  Negative for abdominal pain, constipation, diarrhea, rectal pain and vomiting.   Genitourinary:  Positive for frequency. Negative for difficulty urinating.   Musculoskeletal:  Positive for gait problem.  Negative for back pain.        Bilateral leg pain   Skin:  Positive for wound.   Neurological:  Negative for dizziness and headaches.   Hematological:  Does not bruise/bleed easily.   Psychiatric/Behavioral:  Negative for confusion.    Objective:     Vital Signs (Most Recent):  Temp: 97.9 °F (36.6 °C) (05/17/23 1541)  Pulse: (!) 58 (05/17/23 1541)  Resp: 20 (05/17/23 1541)  BP: (!) 149/64 (05/17/23 1541)  SpO2: 98 % (05/17/23 1541) Vital Signs (24h Range):  Temp:  [97.2 °F (36.2 °C)-98.3 °F (36.8 °C)] 97.9 °F (36.6 °C)  Pulse:  [58-66] 58  Resp:  [18-20] 20  SpO2:  [94 %-98 %] 98 %  BP: (130-165)/(64-72) 149/64     Weight: 110.5 kg (243 lb 9.7 oz)  Body mass index is 35.97 kg/m².  No intake or output data in the 24 hours ending 05/17/23 1641      Physical Exam  Vitals and nursing note reviewed.   Constitutional:       General: He is not in acute distress.     Appearance: Normal appearance. He is not ill-appearing.   HENT:      Head: Normocephalic and atraumatic.   Eyes:      General: No scleral icterus.  Cardiovascular:      Rate and Rhythm: Normal rate and regular rhythm.      Heart sounds: Normal heart sounds. No murmur heard.  Pulmonary:      Effort: Pulmonary effort is normal. No respiratory distress.      Breath sounds: Normal breath sounds. No wheezing or rales.   Abdominal:      General: Abdomen is flat. There is no distension.      Palpations: Abdomen is soft.      Comments: Suprapubic tenderness   Musculoskeletal:         General: Normal range of motion.      Cervical back: Normal range of motion.      Right lower leg: No edema.      Left lower leg: No edema.   Skin:     Findings: Lesion present.      Comments: Left lower extremity with amputated left foot, stump is dry and intact  Diffuse, cracked erythema along the left leg  Ulcerated lesion with exposed fat and tendon on anterior aspect of right ankle   Right foot cool to touch, pulses unable to be palpated    Neurological:      General: No focal deficit  present.      Mental Status: He is alert. Mental status is at baseline.   Psychiatric:      Comments: Flat affect            Significant Labs: All pertinent labs within the past 24 hours have been reviewed.    Significant Imaging: I have reviewed all pertinent imaging results/findings within the past 24 hours.      Assessment/Plan:      * Chronic osteomyelitis  73 year old male with history of T2DM c/b neuropathy, chronic bilateral OM s/p left foot amputation and recent completion of 6 weeks IV antibiotic therapy, htn, CKD, PVD, and DVT of RUE in June 2022 presenting for worsening lower extremity wounds. He noted that his wound wrappings came loose, and he asked his wound care nurses to bring him to the ED. He endorses leg pain, otherwise no signs of systemic infection. On arrival, he is AF, WBC 8.45, lactate WNL. , CRP 50.5. Suspect progression of chronic OM likely due to diabetic nephropathy with poor wound healing due to PVD/PAD.   - Notably, he also required 6 weeks of IV daptomycin in 7/2022, as well as 6 weeks of IV vanc and ceftriaxone 3/2023. Unclear if he ever fully healed after these treatments. Previous biopsies notably positive for proteus, pseudomonas, porphyromonas, prevotella, strep agalactiae, MRSA, and E. cloacae  -Bilateral foot XR on admission with soft tissue defect along the dorsal aspect of the right midfoot, and irregularity and patchy lucency in the calcaneus, talus, and distal tibia of the left foot. MRI bilateral lower extremities pending.  - Blood cx NGTD  - ID, podiatry, and vascular surgery consulted.     Plan:  - On empiric meropenem for UTI, however there is very poor blood flow to the wounds, so do not expect much healing to result from IV antibiotics  - Consider Vascular Surgery consult for AKA if patient agrees to proceed  - F/u ID and podiatry  - F/u MRI  - Tylenol prn for pain       UTI (urinary tract infection)  UA on admission with 3+ leukocytes, > 100 WBC, many bacteria.  He endorses frequency and suprapubic tenderness; no dysuria, urgency, or change in odor of his urine.  - History of ESBL UTI as recently as 2022    - Start empiric meropenem       Hyperglycemia due to diabetes mellitus  Patient admitted with significant hyperglycemia. Most recent fingerstick glucose reviewed-   Recent Labs   Lab 05/16/23  0114 05/16/23  0222 05/16/23  0351 05/16/23  0741   POCTGLUCOSE 238* 98 97 175*     On admission: HCO3 28, AG 10, Na 136 (corrected to 142), lactate 1.8. Not concerned for DKA given lack of acidosis, more consistent with HHS. Now off insulin gtt and on basal bolus regimen. Titrating as necessary     Osteomyelitis of right lower extremity  See chronic OM      Nonhealing ulcer of right lower leg with fat layer exposed  See chronic OM      Acute deep vein thrombosis (DVT) of right upper extremity  Noted 6/2022, due to PICC that had been in place at the time    - Continue Eliquis      Osteomyelitis of left lower extremity  See chronic OM      PVD (peripheral vascular disease)  - See PAD      Peripheral arterial disease  MARIO 2019, right side 0.9, left side unable to be obtained. 2/2021 bilateral angiography with no targetable intervention. Has not seen vascular surgery outpatient since Jan 2021.   - Vasc sx consulted, who offered AKA. Patient declined. They have now signed off. Appreciate their help    - On asa/plavix at home. Will hold asa, continue plavix and eliquis (see DVT)  - F/u MARIO  - Possible Vascular Surgery consult if patient agrees to proceed with AKA      Asymptomatic Mobitz (type) I (Wenckebach's) atrioventricular block  Patient asymptomatic. Does not follow with cardiology outpatient. HR WNL.     - Cont tele       YNES (acute kidney injury)  Creatinine 1.7 on admit, recent baseline around 1.6 since March 2023, however was normal previous to this. Will treat as YNES, though could be progression of CKD. Resolved on admission     Plan:   Lab Results   Component Value Date     CREATININE 1.6 (H) 05/17/2023           Insulin dependent type 2 diabetes mellitus  Patient's FSGs are uncontrolled due to hyperglycemia on current medication regimen.  Last A1c reviewed-   Lab Results   Component Value Date    HGBA1C 8.4 (H) 03/10/2023     Most recent fingerstick glucose reviewed-   Recent Labs   Lab 05/16/23  2033 05/17/23  0847 05/17/23  1222 05/17/23  1546   POCTGLUCOSE 373* 317* 318* 286*     Current correctional scale  Medium  Titrate anti-hyperglycemic dose as follows-   Antihyperglycemics (From admission, onward)    Start     Stop Route Frequency Ordered    05/17/23 1130  insulin aspart U-100 pen 8 Units         -- SubQ 3 times daily with meals 05/17/23 0821    05/16/23 2100  insulin detemir U-100 (Levemir) pen 30 Units         -- SubQ Nightly 05/16/23 0236    05/15/23 1608  insulin aspart U-100 pen 1-10 Units         -- SubQ Before meals & nightly PRN 05/15/23 1509        Hold Oral hypoglycemics while patient is in the hospital. Takes metformin and glipizide at home, as well as 68 units long acting insulin qHS. Patient reports compliance, states he does not take any meal time insulin     Essential hypertension  Home regimen: hydralazine 25mg TID, isordil 10mg TID, nifedipine 60 daily, hyzaar 100-25 daily     - continue nifedipine and isordil, add other meds as appropriate     Diabetic neuropathy  See type 2 DM  - Likely a large source of his nonhealing wounds        VTE Risk Mitigation (From admission, onward)         Ordered     apixaban tablet 5 mg  2 times daily         05/15/23 1433     IP VTE HIGH RISK PATIENT  Once         05/15/23 1431                Discharge Planning   OXANA: 5/23/2023     Code Status: Full Code   Is the patient medically ready for discharge?: No    Reason for patient still in hospital (select all that apply): Patient trending condition  Discharge Plan A: Long-term acute care facility (LTAC)                  Kim Rosas MD  Department of Hospital Medicine    [facility] ([place])

## 2023-05-17 NOTE — ASSESSMENT & PLAN NOTE
Patient's FSGs are uncontrolled due to hyperglycemia on current medication regimen.  Last A1c reviewed-   Lab Results   Component Value Date    HGBA1C 8.4 (H) 03/10/2023     Most recent fingerstick glucose reviewed-   Recent Labs   Lab 05/16/23  2033 05/17/23  0847 05/17/23  1222 05/17/23  1546   POCTGLUCOSE 373* 317* 318* 286*     Current correctional scale  Medium  Titrate anti-hyperglycemic dose as follows-   Antihyperglycemics (From admission, onward)    Start     Stop Route Frequency Ordered    05/17/23 1130  insulin aspart U-100 pen 8 Units         -- SubQ 3 times daily with meals 05/17/23 0821    05/16/23 2100  insulin detemir U-100 (Levemir) pen 30 Units         -- SubQ Nightly 05/16/23 0236    05/15/23 1608  insulin aspart U-100 pen 1-10 Units         -- SubQ Before meals & nightly PRN 05/15/23 1509        Hold Oral hypoglycemics while patient is in the hospital. Takes metformin and glipizide at home, as well as 68 units long acting insulin qHS. Patient reports compliance, states he does not take any meal time insulin

## 2023-05-17 NOTE — ASSESSMENT & PLAN NOTE
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 He noted that his wound wrappings came loose, and he asked his wound care nurses to bring him to the ED. He endorses leg pain, otherwise no signs of systemic infection. On arrival, he is AF, WBC 8.45, lactate WNL. , CRP 50.5. Suspect progression of chronic OM likely due to diabetic nephropathy with poor wound healing due to PVD/PAD.   - Notably, he also required 6 weeks of IV daptomycin in 7/2022, as well as 6 weeks of IV vanc and ceftriaxone 3/2023. Unclear if he ever fully healed after these treatments. Previous biopsies notably positive for proteus, pseudomonas, porphyromonas, prevotella, strep agalactiae, MRSA, and E. cloacae  -Bilateral foot XR on admission with soft tissue defect along the dorsal aspect of the right midfoot, and irregularity and patchy lucency in the calcaneus, talus, and distal tibia of the left foot. MRI bilateral lower extremities pending.  - Blood cx NGTD  - ID, podiatry, and vascular surgery consulted.     Plan:  - On empiric meropenem for UTI, however there is very poor blood flow to the wounds, so do not expect much healing to result from IV antibiotics  - Consider Vascular Surgery consult for AKA if patient agrees to proceed  - F/u ID and podiatry  - F/u MRI  - Tylenol prn for pain

## 2023-05-17 NOTE — ASSESSMENT & PLAN NOTE
Creatinine 1.7 on admit, recent baseline around 1.6 since March 2023, however was normal previous to this. Will treat as YNES, though could be progression of CKD. Resolved on admission     Plan:   Lab Results   Component Value Date    CREATININE 1.6 (H) 05/17/2023

## 2023-05-17 NOTE — ACP (ADVANCE CARE PLANNING)
Advance Care Planning     Date: 05/17/2023    [facility]  I engaged the patient in a conversation about advance care planning and we specifically addressed what the goals of care would be moving forward. I spoke with Mr. Castañeda and explained that his osteomyelitis was worsening and could no longer be treated with IV antibiotics due to very poor peripheral perfusion. I explained to Mr. Castañeda that we had exhausted all treatment options excluding a bilateral AKA. The patient acknowledged the severity of his condition and shared why he was reluctant to undergo an AKA in the past. I explained that with prosthesis, Mr. Castañeda could potentially have better mobility and less pain than he currently does. Mr. Castañeda was understanding of his diagnosis and stated that he would discuss with his sister about whether to undergo a bilateral AKA or not.   Will reengage patient tomorrow regarding undergoing above the knee amputation.     Kim Rosas MD PGY2  Internal Medicine

## 2023-05-17 NOTE — PHARMACY MED REC
"  Admission Medication History     The home medication history was taken by Nicole Cullen.    You may go to "Admission" then "Reconcile Home Medications" tabs to review and/or act upon these items.     The home medication list has been updated by the Pharmacy department.   Please read ALL comments highlighted in yellow.   Please address this information as you see fit.    Feel free to contact us if you have any questions or require assistance.      The medications listed below were removed from the home medication list. Please reorder if appropriate:  Patient reports no longer taking the following medication(s):  LACTOBACILLUS RHAMNOSUS GG CAP    Medications listed below were obtained from: Patient/Analytic software- [employer] &  Nurse  PTA Medications   Medication Sig    ascorbic acid, vitamin C, (VITAMIN C) 500 MG tablet   Take 500 mg by mouth once daily.    clopidogreL (PLAVIX) 75 mg tablet   Take 1 tablet (75 mg total) by mouth once daily.    NIFEdipine (ADALAT CC) 60 MG TbSR   Take 60 mg by mouth once daily.    pantoprazole (PROTONIX) 40 MG tablet   Take 40 mg by mouth once daily.    rosuvastatin (CRESTOR) 40 MG Tab   Take 40 mg by mouth once daily.    apixaban (ELIQUIS) 5 mg Tab Take 1 tablet (5 mg total) by mouth 2 (two) times daily.      aspirin (ECOTRIN) 81 MG EC tablet   Take 81 mg by mouth once daily.    B COMPLEX-VITAMIN B12 tablet   Take 1 tablet by mouth once daily.    BD ULTRA-FINE ADITYA PEN NEEDLE 32 gauge x 5/32" Ndle   USE FOUR TIMES DAILY WITH MEALS AND NIGHTLY    blood sugar diagnostic (CONTOUR TEST STRIPS) Strp   Inject 1 strip into the skin 2 (two) times daily before meals.    furosemide (LASIX) 40 MG tablet   Take 40 mg by mouth once daily.    gabapentin (NEURONTIN) 100 MG capsule   Take 300 mg by mouth 2 (two) times daily.      glipiZIDE (GLUCOTROL) 10 MG tablet   Take 20 mg by mouth 2 (two) times a day.    hydrALAZINE (APRESOLINE) 25 MG tablet   Take 25 mg by mouth 2 (two) times a day.    " isosorbide dinitrate (ISORDIL) 10 MG tablet   Take 1 tablet (10 mg total) by mouth 3 (three) times daily.    LANTUS SOLOSTAR U-100 INSULIN glargine 100 units/mL SubQ pen   Inject 50 Units into the skin every evening.    losartan-hydrochlorothiazide 100-25 mg (HYZAAR) 100-25 mg per tablet   Take 1 tablet by mouth once daily.    metFORMIN (GLUCOPHAGE-XR) 500 MG ER 24hr tablet   Take 1,000 mg by mouth 2 (two) times a day.    MICROLET LANCET Misc     tamsulosin (FLOMAX) 0.4 mg Cap Take 0.4 mg by mouth once daily.       Potential issues to be addressed PRIOR TO DISCHARGE  Patient reported not taking the following medications: (NOVOLOG). These medications remain on the home medication list. Please address accordingly.     Please discuss with the patient barriers to adherence with medication treatment plans    Patient requires education regarding drug therapies     Per ZACHARIAH Wheeler Nurse, medications were prepared in advance, however patient has been non-complaint.          Nicole Cullen  EXT [id number]                .

## 2023-05-18 LAB
ANION GAP SERPL CALC-SCNC: 10 MMOL/L (ref 8–16)
ANION GAP SERPL CALC-SCNC: 10 MMOL/L (ref 8–16)
ANION GAP SERPL CALC-SCNC: 7 MMOL/L (ref 8–16)
ANION GAP SERPL CALC-SCNC: 9 MMOL/L (ref 8–16)
BASOPHILS # BLD AUTO: 0.04 K/UL (ref 0–0.2)
BASOPHILS NFR BLD: 0.6 % (ref 0–1.9)
BUN SERPL-MCNC: 21 MG/DL (ref 8–23)
BUN SERPL-MCNC: 22 MG/DL (ref 8–23)
BUN SERPL-MCNC: 24 MG/DL (ref 8–23)
BUN SERPL-MCNC: 24 MG/DL (ref 8–23)
CALCIUM SERPL-MCNC: 8.8 MG/DL (ref 8.7–10.5)
CALCIUM SERPL-MCNC: 9.1 MG/DL (ref 8.7–10.5)
CALCIUM SERPL-MCNC: 9.2 MG/DL (ref 8.7–10.5)
CALCIUM SERPL-MCNC: 9.3 MG/DL (ref 8.7–10.5)
CHLORIDE SERPL-SCNC: 100 MMOL/L (ref 95–110)
CHLORIDE SERPL-SCNC: 102 MMOL/L (ref 95–110)
CHLORIDE SERPL-SCNC: 102 MMOL/L (ref 95–110)
CHLORIDE SERPL-SCNC: 103 MMOL/L (ref 95–110)
CO2 SERPL-SCNC: 26 MMOL/L (ref 23–29)
CO2 SERPL-SCNC: 26 MMOL/L (ref 23–29)
CO2 SERPL-SCNC: 28 MMOL/L (ref 23–29)
CO2 SERPL-SCNC: 30 MMOL/L (ref 23–29)
CREAT SERPL-MCNC: 1.2 MG/DL (ref 0.5–1.4)
CREAT SERPL-MCNC: 1.3 MG/DL (ref 0.5–1.4)
CREAT SERPL-MCNC: 1.4 MG/DL (ref 0.5–1.4)
CREAT SERPL-MCNC: 1.4 MG/DL (ref 0.5–1.4)
DIFFERENTIAL METHOD: ABNORMAL
EOSINOPHIL # BLD AUTO: 0.3 K/UL (ref 0–0.5)
EOSINOPHIL NFR BLD: 4.8 % (ref 0–8)
ERYTHROCYTE [DISTWIDTH] IN BLOOD BY AUTOMATED COUNT: 16.9 % (ref 11.5–14.5)
EST. GFR  (NO RACE VARIABLE): 52.7 ML/MIN/1.73 M^2
EST. GFR  (NO RACE VARIABLE): 52.7 ML/MIN/1.73 M^2
EST. GFR  (NO RACE VARIABLE): 57.6 ML/MIN/1.73 M^2
EST. GFR  (NO RACE VARIABLE): >60 ML/MIN/1.73 M^2
GLUCOSE SERPL-MCNC: 231 MG/DL (ref 70–110)
GLUCOSE SERPL-MCNC: 244 MG/DL (ref 70–110)
GLUCOSE SERPL-MCNC: 261 MG/DL (ref 70–110)
GLUCOSE SERPL-MCNC: 291 MG/DL (ref 70–110)
HCT VFR BLD AUTO: 27.6 % (ref 40–54)
HGB BLD-MCNC: 8.4 G/DL (ref 14–18)
IMM GRANULOCYTES # BLD AUTO: 0.01 K/UL (ref 0–0.04)
IMM GRANULOCYTES NFR BLD AUTO: 0.1 % (ref 0–0.5)
LYMPHOCYTES # BLD AUTO: 1.4 K/UL (ref 1–4.8)
LYMPHOCYTES NFR BLD: 19.9 % (ref 18–48)
MAGNESIUM SERPL-MCNC: 1.8 MG/DL (ref 1.6–2.6)
MCH RBC QN AUTO: 26.4 PG (ref 27–31)
MCHC RBC AUTO-ENTMCNC: 30.4 G/DL (ref 32–36)
MCV RBC AUTO: 87 FL (ref 82–98)
MONOCYTES # BLD AUTO: 0.6 K/UL (ref 0.3–1)
MONOCYTES NFR BLD: 8 % (ref 4–15)
NEUTROPHILS # BLD AUTO: 4.6 K/UL (ref 1.8–7.7)
NEUTROPHILS NFR BLD: 66.6 % (ref 38–73)
NRBC BLD-RTO: 0 /100 WBC
PHOSPHATE SERPL-MCNC: 3.1 MG/DL (ref 2.7–4.5)
PLATELET # BLD AUTO: 318 K/UL (ref 150–450)
PMV BLD AUTO: 9.9 FL (ref 9.2–12.9)
POCT GLUCOSE: 262 MG/DL (ref 70–110)
POCT GLUCOSE: 277 MG/DL (ref 70–110)
POCT GLUCOSE: 280 MG/DL (ref 70–110)
POCT GLUCOSE: 315 MG/DL (ref 70–110)
POTASSIUM SERPL-SCNC: 4.2 MMOL/L (ref 3.5–5.1)
POTASSIUM SERPL-SCNC: 4.3 MMOL/L (ref 3.5–5.1)
POTASSIUM SERPL-SCNC: 4.5 MMOL/L (ref 3.5–5.1)
POTASSIUM SERPL-SCNC: 4.6 MMOL/L (ref 3.5–5.1)
RBC # BLD AUTO: 3.18 M/UL (ref 4.6–6.2)
SODIUM SERPL-SCNC: 137 MMOL/L (ref 136–145)
SODIUM SERPL-SCNC: 138 MMOL/L (ref 136–145)
SODIUM SERPL-SCNC: 139 MMOL/L (ref 136–145)
SODIUM SERPL-SCNC: 139 MMOL/L (ref 136–145)
WBC # BLD AUTO: 6.88 K/UL (ref 3.9–12.7)

## 2023-05-18 PROCEDURE — 25000003 PHARM REV CODE 250

## 2023-05-18 PROCEDURE — 97535 SELF CARE MNGMENT TRAINING: CPT | Mod: CO

## 2023-05-18 PROCEDURE — 85025 COMPLETE CBC W/AUTO DIFF WBC: CPT

## 2023-05-18 PROCEDURE — 80048 BASIC METABOLIC PNL TOTAL CA: CPT

## 2023-05-18 PROCEDURE — 99233 PR SUBSEQUENT HOSPITAL CARE,LEVL III: ICD-10-PCS | Mod: GC,,, | Performed by: STUDENT IN AN ORGANIZED HEALTH CARE EDUCATION/TRAINING PROGRAM

## 2023-05-18 PROCEDURE — 83735 ASSAY OF MAGNESIUM: CPT

## 2023-05-18 PROCEDURE — 84100 ASSAY OF PHOSPHORUS: CPT

## 2023-05-18 PROCEDURE — 20600001 HC STEP DOWN PRIVATE ROOM

## 2023-05-18 PROCEDURE — 63600175 PHARM REV CODE 636 W HCPCS

## 2023-05-18 PROCEDURE — 97110 THERAPEUTIC EXERCISES: CPT | Mod: CQ

## 2023-05-18 PROCEDURE — 80048 BASIC METABOLIC PNL TOTAL CA: CPT | Mod: 91

## 2023-05-18 PROCEDURE — 99233 SBSQ HOSP IP/OBS HIGH 50: CPT | Mod: GC,,, | Performed by: STUDENT IN AN ORGANIZED HEALTH CARE EDUCATION/TRAINING PROGRAM

## 2023-05-18 PROCEDURE — 97530 THERAPEUTIC ACTIVITIES: CPT | Mod: CO

## 2023-05-18 PROCEDURE — 36415 COLL VENOUS BLD VENIPUNCTURE: CPT

## 2023-05-18 RX ORDER — INSULIN ASPART 100 [IU]/ML
10 INJECTION, SOLUTION INTRAVENOUS; SUBCUTANEOUS
Status: DISCONTINUED | OUTPATIENT
Start: 2023-05-18 | End: 2023-05-19

## 2023-05-18 RX ORDER — LOSARTAN POTASSIUM AND HYDROCHLOROTHIAZIDE 25; 100 MG/1; MG/1
1 TABLET ORAL DAILY
Status: DISCONTINUED | OUTPATIENT
Start: 2023-05-18 | End: 2023-05-19 | Stop reason: HOSPADM

## 2023-05-18 RX ADMIN — COLLAGENASE SANTYL: 250 OINTMENT TOPICAL at 08:05

## 2023-05-18 RX ADMIN — MUPIROCIN: 20 OINTMENT TOPICAL at 08:05

## 2023-05-18 RX ADMIN — ISOSORBIDE DINITRATE 10 MG: 10 TABLET ORAL at 03:05

## 2023-05-18 RX ADMIN — CEFTRIAXONE 1 G: 1 INJECTION, POWDER, FOR SOLUTION INTRAMUSCULAR; INTRAVENOUS at 11:05

## 2023-05-18 RX ADMIN — INSULIN ASPART 10 UNITS: 100 INJECTION, SOLUTION INTRAVENOUS; SUBCUTANEOUS at 08:05

## 2023-05-18 RX ADMIN — OXYCODONE HYDROCHLORIDE 5 MG: 5 TABLET ORAL at 08:05

## 2023-05-18 RX ADMIN — INSULIN ASPART 10 UNITS: 100 INJECTION, SOLUTION INTRAVENOUS; SUBCUTANEOUS at 11:05

## 2023-05-18 RX ADMIN — ISOSORBIDE DINITRATE 10 MG: 10 TABLET ORAL at 08:05

## 2023-05-18 RX ADMIN — INSULIN ASPART 3 UNITS: 100 INJECTION, SOLUTION INTRAVENOUS; SUBCUTANEOUS at 09:05

## 2023-05-18 RX ADMIN — NIFEDIPINE 60 MG: 30 TABLET, FILM COATED, EXTENDED RELEASE ORAL at 08:05

## 2023-05-18 RX ADMIN — ATORVASTATIN CALCIUM 80 MG: 40 TABLET, FILM COATED ORAL at 08:05

## 2023-05-18 RX ADMIN — APIXABAN 5 MG: 5 TABLET, FILM COATED ORAL at 08:05

## 2023-05-18 RX ADMIN — TAMSULOSIN HYDROCHLORIDE 0.4 MG: 0.4 CAPSULE ORAL at 08:05

## 2023-05-18 RX ADMIN — PANTOPRAZOLE SODIUM 40 MG: 40 TABLET, DELAYED RELEASE ORAL at 08:05

## 2023-05-18 RX ADMIN — INSULIN ASPART 6 UNITS: 100 INJECTION, SOLUTION INTRAVENOUS; SUBCUTANEOUS at 05:05

## 2023-05-18 RX ADMIN — OXYCODONE HYDROCHLORIDE 5 MG: 5 TABLET ORAL at 03:05

## 2023-05-18 RX ADMIN — LOSARTAN POTASSIUM AND HYDROCHLOROTHIAZIDE 1 TABLET: 100; 25 TABLET, FILM COATED ORAL at 03:05

## 2023-05-18 RX ADMIN — INSULIN ASPART 6 UNITS: 100 INJECTION, SOLUTION INTRAVENOUS; SUBCUTANEOUS at 08:05

## 2023-05-18 RX ADMIN — INSULIN ASPART 10 UNITS: 100 INJECTION, SOLUTION INTRAVENOUS; SUBCUTANEOUS at 05:05

## 2023-05-18 RX ADMIN — OXYCODONE HYDROCHLORIDE 5 MG: 5 TABLET ORAL at 09:05

## 2023-05-18 RX ADMIN — CLOPIDOGREL BISULFATE 75 MG: 75 TABLET ORAL at 08:05

## 2023-05-18 RX ADMIN — MEROPENEM 1 G: 1 INJECTION INTRAVENOUS at 04:05

## 2023-05-18 RX ADMIN — INSULIN ASPART 8 UNITS: 100 INJECTION, SOLUTION INTRAVENOUS; SUBCUTANEOUS at 11:05

## 2023-05-18 NOTE — PLAN OF CARE
ID  Osteomyelitis of right lower extremity  See rest of plan      Osteomyelitis of left lower extremity  See rest of plan      Orthopedic  Nonhealing ulcer of right lower leg with fat layer exposed  IDSA moderate diabetic foot infection with bilateral full-thickness ulcerations of the lower extremity and osteomyelitis. Right foot MRI with findings concerning for early osteomyelitis of the navicular and medial cuneiform.  Left foot MRI indicates posterior calcaneal osteomyelitis.  Right foot x-ray with chronic changes in the 5th metatarsal.  Left foot x-ray with potential solitary bubble of soft tissue emphysema just inferior to the calcaneus.  ABIs were ordered and deemed unreliable due to noncompressible calcified vessels.  Bilateral lower extremity ultrasound without evidence for vascular occlusion or hemodynamically significant stenosis of the right or left lower extremity arterial system.  Vascular surgery assess the patient and deemed that he was not a revascularization candidate due to nonambulatory status, noting patient can no longer heel BKA is now recommending bilateral AKA's.  Vascular surgery notes the patient is not amenable.  When I spoke with patient he seemed willing to consider the possibility. Discussed with hospital medicine and Dr. Barlow, and decided to transfer patient to [facility] for interventional cardiology workup        Patient was to be transferred to [facility] facility for interventional cardiology, but is not amenable stating he does not like the facility and the providers/nurses at the [facility], refuses to discuss any further. There will be no plans for intervention from podiatry, okay to discharge. See discharge recs below.   Right anterior ankle wound cultures obtained in addition to left plantar wound cultures as well  Santyl enzymatic wound debridment ontment ordered   Hydrafera blue ready ordered   Heel offloading boots ordered, to be wore at all times   Wounds cleansed and  dressed per podiatry   Excisional wound debridment of right anterior ankle, necrosing tendon exposed was excsied as serves for path for infection   Antibiotics per primary/ID   Nursing wound care routine ordered  Podiatry will follow    Discharge recs:   Wound care: 2 times weekly: Cleanse b/l foot and leg wounds with vashe, apply hydrafera blue ready to all wound bases, follow with 4x4s, kerlix, cast padding, and ace.   Abx Per ID   WB: Non ambulatory   Follow up with  in [facility] podiatry clinic within 2 weeks of discharge, please place outpatient ambulatory referral.         Ronaldo Duff DPM, PGY-2  Podiatry / Foot and Ankle Surgery   Page # [phone]  Secure chat preferred

## 2023-05-18 NOTE — PROGRESS NOTES
"[facility] ([address] Medicine  Progress Note    Patient Name: Saji Castañeda  MRN: [id number]  Patient Class: IP- Inpatient   Admission Date: 5/15/2023  Length of Stay: 3 days  Attending Physician: Cristian Simpson MD  Primary Care Provider: [facility]        Subjective:     Principal Problem:Chronic osteomyelitis        HPI:  73 year old male with history of T2DM c/b neuropathy, chronic bilateral OM s/p left foot amputation, htn, CKD, PVD, DVT of RUE in June 2022, and asymptomatic Mobitz Type 1 AV block presenting for worsening lower extremity wounds. He has had these wound for years, and follows with [facility]. He recently presented 3/2023 for BLE cellulitis, found to have subacute OM of the left foot, and acute OM of the right ankle. He was treated with IV Vanc and Ceftriaxone as well as po flagyl for six weeks, and this was recently completed. During that admission, surgery was consulted for bilateral BKA, which has been discussed several times. He declined.   Wound care comes to his house where he lives with three roommates twice a week, every Monday and Friday. He feels that the wounds are not being adequately wrapped. Today he comes in because the wound wrappings have come loose and he asked his wound care nurses to bring him to the hospital. He does not notice change in appearance to the wounds. There is constant drainage, however has not increased recently. He otherwise does not care for his wounds. He has a shuttle service take him to wound care appointments, however he has had difficulty with transportation recently, thus causing him to miss appointments. He states he can stand on his legs "a little"; he can walk to his front door with a walker. One episode of diarrhea last week, otherwise has been well and without fevers. He has never smoked, does not drink alcohol, does not utilize illicit drugs.     On arrival to [facility], he has a temp of 100, HR " 95, /80, on RA. WBC 8.45, Hgb 9.2 (at baseline), plts 406. CMP pending. . CRP pending. Lactate 1.8. Foot XR wtih soft tissue defect along the dorsal aspect of the right midfoot, and irregularity and patchy lucency in the calcaneus, talus, and distal tibia of the left foot. MRI bilateral lower extremities pending. ID, podiatry, and vascular surgery consulted. He has been admitted to hospital medicine for management of bilateral LE OM.       Overview/Hospital Course:  Patient admitted to hospital medicine for management of progression of chronic lower extremity osteomyelitis and non healing wounds. Vascular surgery consulted, who recommended AKA. Patient refused. MARIO's unreliable due to non compressible vessels. ID consulted, who noted that antibiotic therapy would likely be ineffective given his degree of vasculopathy and inability to permeate the wounds. Wound care and podiatry also consulted for assistance. Bilateral LE MRI with left chronic OM and right acute OM. Blood cultures NGTD. Glucose resolved with insulin gtt, transitioned to basal bolus. UA notable for UTI. Has history of ESBL E coli UTI, initiated on empiric meropenem. Culture with pan sensitive Klebsiella, deescalated to ceftriaxone. Planning to transfer to [place] for further evaluation with vascular surgery       Interval History: NAEON. Pt initially refusing transfer, upon conversation, patient willing to transfer to [place] for further vascular evaluation. Pain well controlled. No other concerns.    Review of Systems   Constitutional:  Negative for fatigue and fever.   HENT:  Negative for congestion.    Eyes:  Negative for visual disturbance.   Respiratory:  Negative for chest tightness, shortness of breath and wheezing.    Cardiovascular:  Negative for chest pain and palpitations.   Gastrointestinal:  Negative for abdominal pain, constipation, diarrhea, rectal pain and vomiting.   Genitourinary:  Positive for frequency. Negative for  difficulty urinating.   Musculoskeletal:  Positive for gait problem. Negative for back pain.        Bilateral leg pain   Skin:  Positive for wound.   Neurological:  Negative for dizziness and headaches.   Hematological:  Does not bruise/bleed easily.   Psychiatric/Behavioral:  Negative for confusion.    Objective:     Vital Signs (Most Recent):  Temp: 98.7 °F (37.1 °C) (05/18/23 0753)  Pulse: 92 (05/18/23 1110)  Resp: 20 (05/18/23 1110)  BP: (!) 140/63 (05/18/23 1110)  SpO2: 97 % (05/18/23 1110) Vital Signs (24h Range):  Temp:  [97.9 °F (36.6 °C)-98.7 °F (37.1 °C)] 98.7 °F (37.1 °C)  Pulse:  [55-92] 92  Resp:  [18-20] 20  SpO2:  [96 %-98 %] 97 %  BP: (140-177)/(63-80) 140/63     Weight: 110.5 kg (243 lb 9.7 oz)  Body mass index is 35.97 kg/m².  No intake or output data in the 24 hours ending 05/18/23 1411      Physical Exam  Vitals and nursing note reviewed.   Constitutional:       General: He is not in acute distress.     Appearance: Normal appearance. He is not ill-appearing.   HENT:      Head: Normocephalic and atraumatic.   Eyes:      General: No scleral icterus.  Cardiovascular:      Rate and Rhythm: Normal rate and regular rhythm.      Heart sounds: Normal heart sounds. No murmur heard.  Pulmonary:      Effort: Pulmonary effort is normal. No respiratory distress.      Breath sounds: Normal breath sounds. No wheezing or rales.   Abdominal:      General: Abdomen is flat. There is no distension.      Palpations: Abdomen is soft.   Musculoskeletal:         General: Normal range of motion.      Cervical back: Normal range of motion.      Right lower leg: No edema.      Left lower leg: No edema.   Skin:     Findings: Lesion present.      Comments: Left lower extremity with amputated left foot, stump is dry and intact  Diffuse, cracked erythema along the left leg  Ulcerated lesion with exposed fat and tendon on anterior aspect of right ankle   Right foot cool to touch, pulses unable to be palpated    Neurological:       General: No focal deficit present.      Mental Status: He is alert. Mental status is at baseline.   Psychiatric:      Comments: Flat affect            Significant Labs: All pertinent labs within the past 24 hours have been reviewed.    Significant Imaging: I have reviewed all pertinent imaging results/findings within the past 24 hours.      Assessment/Plan:      * Chronic osteomyelitis  73 year old male with history of T2DM c/b neuropathy, chronic bilateral OM s/p left foot amputation and recent completion of 6 weeks IV antibiotic therapy, htn, CKD, PVD, and DVT of RUE in June 2022 presenting for worsening lower extremity wounds. He noted that his wound wrappings came loose, and he asked his wound care nurses to bring him to the ED. He endorses leg pain, otherwise no signs of systemic infection. On arrival, he is AF, WBC 8.45, lactate WNL. , CRP 50.5. Suspect progression of chronic OM likely due to diabetic nephropathy with poor wound healing due to PVD/PAD.   - Notably, he also required 6 weeks of IV daptomycin in 7/2022, as well as 6 weeks of IV vanc and ceftriaxone 3/2023. Unclear if he ever fully healed after these treatments. Previous biopsies notably positive for proteus, pseudomonas, porphyromonas, prevotella, strep agalactiae, MRSA, and E. cloacae  -Bilateral foot XR on admission with soft tissue defect along the dorsal aspect of the right midfoot, and irregularity and patchy lucency in the calcaneus, talus, and distal tibia of the left foot. MRI bilateral lower extremities pending.  - Blood cx NGTD  - ID does not recommend antibiotic therapy given his degree of vascular injury     Plan:  - Plan to transfer to [place] for further evaluation, as all options for medical management have been exhausted   - Appreciate assistance of podiatry and wound care    Osteomyelitis of left lower extremity  See chronic OM      Osteomyelitis of right lower extremity  See chronic OM      Nonhealing ulcer of right  lower leg with fat layer exposed  See chronic OM      Hyperglycemia due to diabetes mellitus  Patient admitted with significant hyperglycemia. Most recent fingerstick glucose reviewed-   Recent Labs   Lab 05/17/23  1546 05/17/23 2015 05/18/23  0754 05/18/23  1118   POCTGLUCOSE 286* 201* 277* 315*     On admission: HCO3 28, AG 10, Na 136 (corrected to 142), lactate 1.8. Not concerned for DKA given lack of acidosis, more consistent with HHS. Now off insulin gtt and on basal bolus regimen. Titrating as necessary     UTI (urinary tract infection)  UA on admission with 3+ leukocytes, > 100 WBC, many bacteria. He endorses frequency and suprapubic tenderness; no dysuria, urgency, or change in odor of his urine.  - History of ESBL UTI as recently as 2022  - Urine culture this admission with pan sensitive klebsiella \    - Deescalate meropenem to CTX      Acute deep vein thrombosis (DVT) of right upper extremity  Noted 6/2022, due to PICC that had been in place at the time    - Continue Eliquis      PVD (peripheral vascular disease)  - See PAD      Peripheral arterial disease  MARIO 2019, right side 0.9, left side unable to be obtained. 2/2021 bilateral angiography with no targetable intervention. Has not seen vascular surgery outpatient since Jan 2021.   - Vasc sx consulted, who offered AKA. Patient declined. They have now signed off. Appreciate their help  - ABIs unreliable due to non compressible vessels    - On asa/plavix at home. Will hold asa, continue plavix and eliquis (see DVT)  - Planning to transfer to [place] for further vascular surgery evaluation       Asymptomatic Mobitz (type) I (Wenckebach's) atrioventricular block  Patient asymptomatic. Does not follow with cardiology outpatient. HR WNL.     - Cont tele       YNES (acute kidney injury)  Creatinine 1.7 on admit, recent baseline around 1.6 since March 2023, however was normal previous to this. Will treat as YNES, though could be progression of CKD. Resolved on  admission     Plan:   Lab Results   Component Value Date    CREATININE 1.3 05/18/2023           Insulin dependent type 2 diabetes mellitus  Patient's FSGs are uncontrolled due to hyperglycemia on current medication regimen.  Last A1c reviewed-   Lab Results   Component Value Date    HGBA1C 8.4 (H) 03/10/2023     Most recent fingerstick glucose reviewed-   Recent Labs   Lab 05/17/23  1546 05/17/23 2015 05/18/23  0754 05/18/23  1118   POCTGLUCOSE 286* 201* 277* 315*     Current correctional scale  Medium  Titrate anti-hyperglycemic dose as follows-   Antihyperglycemics (From admission, onward)    Start     Stop Route Frequency Ordered    05/18/23 2100  insulin detemir U-100 (Levemir) pen 34 Units         -- SubQ Nightly 05/18/23 0733    05/18/23 1130  insulin aspart U-100 pen 10 Units         -- SubQ 3 times daily with meals 05/18/23 0733    05/15/23 1608  insulin aspart U-100 pen 1-10 Units         -- SubQ Before meals & nightly PRN 05/15/23 1509        Hold Oral hypoglycemics while patient is in the hospital. Takes metformin and glipizide at home, as well as 68 units long acting insulin qHS. Patient reports compliance, states he does not take any meal time insulin     Essential hypertension  Home regimen: hydralazine 25mg TID, isordil 10mg TID, nifedipine 60 daily, hyzaar 100-25 daily     - continue nifedipine, isordil, and hyzaar     Diabetic neuropathy  See type 2 DM  - Likely a large source of his nonhealing wounds        VTE Risk Mitigation (From admission, onward)         Ordered     apixaban tablet 5 mg  2 times daily         05/15/23 1433     IP VTE HIGH RISK PATIENT  Once         05/15/23 1431                Discharge Planning   OXANA: 5/23/2023     Code Status: Full Code   Is the patient medically ready for discharge?: No    Reason for patient still in hospital (select all that apply): Pending disposition  Discharge Plan A: Home Health   Discharge Delays: None known at this time    Monica Abbeville, DO   PGY1  Department of Hospital Medicine   [facility] ([place]

## 2023-05-18 NOTE — PT/OT/SLP PROGRESS
"Physical Therapy Treatment    Patient Name:  Saji Castañdea   MRN:  [id number]    Recommendations:     Discharge Recommendations: other (see comments)  Discharge Equipment Recommendations: none  Barriers to discharge:  increased level of assistance with mobility    Assessment:     Saji Castañeda is a 73 y.o. male admitted with a medical diagnosis of Chronic osteomyelitis.  He presents with the following impairments/functional limitations: weakness, impaired endurance, impaired functional mobility, decreased safety awareness, pain, decreased lower extremity function. Pt reported he was having a lot of pain and unable to sit EOB this session but agreed to performing supine theraex. Pt would benefit from continued skilled PT intervention to address functional mobility and goals.    Rehab Prognosis: Good; patient would benefit from acute skilled PT services to address these deficits and reach maximum level of function.    Recent Surgery: * No surgery found *      Plan:     During this hospitalization, patient to be seen 3 x/week to address the identified rehab impairments via therapeutic activities, therapeutic exercises, neuromuscular re-education, wheelchair management/training and progress toward the following goals:    Plan of Care Expires:  06/16/23    Subjective     Chief Complaint: Pain  Patient/Family Comments/goals: "I sat up early today and don't feel like sitting up again."  Pain/Comfort:  Pain Rating 1: 7/10  Location - Side 1: Left  Location - Orientation 1: lower  Location 1: leg  Pain Addressed 1: Reposition, Distraction  Pain Rating Post-Intervention 1: other (see comments) (unrated)      Objective:     Communicated with RN prior to session.  Patient found HOB elevated with PureWick, peripheral IV upon PT entry to room.     General Precautions: Standard, fall  Orthopedic Precautions: RLE partial weight bearing, LLE non weight bearing  Braces:  (darco shoe on R foot)  Respiratory Status: Room air   "   Functional Mobility:  Bed Mobility:     Rolling Left:  contact guard assistance  Rolling Right: contact guard assistance      AM-PAC 6 CLICK MOBILITY  Turning over in bed (including adjusting bedclothes, sheets and blankets)?: 4  Sitting down on and standing up from a chair with arms (e.g., wheelchair, bedside commode, etc.): 3  Moving from lying on back to sitting on the side of the bed?: 3  Moving to and from a bed to a chair (including a wheelchair)?: 3  Need to walk in hospital room?: 2  Climbing 3-5 steps with a railing?: 1  Basic Mobility Total Score: 16       Treatment & Education:  Pt performed supine hip abduction, heel slides, SLR x 10 reps each leg. Educated pt to continue working on exercises throughout the day to prevent loss of strength. Pt verbally understood.    Pt and educated on:  - Role of PT and POC/goals for therapy   - Safety with mobility and fall risk   - Instructed to call nursing staff for assistance with mobility as needed     Pt verbalized understanding and expressed no further concerns/questions.      Patient left HOB elevated with all lines intact and call button in reach..    GOALS:   Multidisciplinary Problems       Physical Therapy Goals          Problem: Physical Therapy    Goal Priority Disciplines Outcome Goal Variances Interventions   Physical Therapy Goal     PT, PT/OT Ongoing, Progressing     Description: Goals to be met by: 2023     Patient will increase functional independence with mobility by performin. Supine to sit with Nolan  2. Sit to supine with Nolan  3. Bed to wheelchair transfer with Supervision using LRAD  4. Wheelchair propulsion x50' with R heel and B UE as needed supvn                          Time Tracking:     PT Received On: 23  PT Start Time: 1349     PT Stop Time: 1402  PT Total Time (min): 13 min     Billable Minutes: Therapeutic Exercise 13 mins    Treatment Type: Treatment  PT/PTA: PTA     Number of PTA visits since last  PT visit: 1     05/18/2023

## 2023-05-18 NOTE — SUBJECTIVE & OBJECTIVE
Interval History: NAEON. Pt initially refusing transfer, upon conversation, patient willing to transfer to [place] for further vascular evaluation. Pain well controlled. No other concerns.    Review of Systems   Constitutional:  Negative for fatigue and fever.   HENT:  Negative for congestion.    Eyes:  Negative for visual disturbance.   Respiratory:  Negative for chest tightness, shortness of breath and wheezing.    Cardiovascular:  Negative for chest pain and palpitations.   Gastrointestinal:  Negative for abdominal pain, constipation, diarrhea, rectal pain and vomiting.   Genitourinary:  Positive for frequency. Negative for difficulty urinating.   Musculoskeletal:  Positive for gait problem. Negative for back pain.        Bilateral leg pain   Skin:  Positive for wound.   Neurological:  Negative for dizziness and headaches.   Hematological:  Does not bruise/bleed easily.   Psychiatric/Behavioral:  Negative for confusion.    Objective:     Vital Signs (Most Recent):  Temp: 98.7 °F (37.1 °C) (05/18/23 0753)  Pulse: 92 (05/18/23 1110)  Resp: 20 (05/18/23 1110)  BP: (!) 140/63 (05/18/23 1110)  SpO2: 97 % (05/18/23 1110) Vital Signs (24h Range):  Temp:  [97.9 °F (36.6 °C)-98.7 °F (37.1 °C)] 98.7 °F (37.1 °C)  Pulse:  [55-92] 92  Resp:  [18-20] 20  SpO2:  [96 %-98 %] 97 %  BP: (140-177)/(63-80) 140/63     Weight: 110.5 kg (243 lb 9.7 oz)  Body mass index is 35.97 kg/m².  No intake or output data in the 24 hours ending 05/18/23 1411      Physical Exam  Vitals and nursing note reviewed.   Constitutional:       General: He is not in acute distress.     Appearance: Normal appearance. He is not ill-appearing.   HENT:      Head: Normocephalic and atraumatic.   Eyes:      General: No scleral icterus.  Cardiovascular:      Rate and Rhythm: Normal rate and regular rhythm.      Heart sounds: Normal heart sounds. No murmur heard.  Pulmonary:      Effort: Pulmonary effort is normal. No respiratory distress.      Breath sounds:  Normal breath sounds. No wheezing or rales.   Abdominal:      General: Abdomen is flat. There is no distension.      Palpations: Abdomen is soft.   Musculoskeletal:         General: Normal range of motion.      Cervical back: Normal range of motion.      Right lower leg: No edema.      Left lower leg: No edema.   Skin:     Findings: Lesion present.      Comments: Left lower extremity with amputated left foot, stump is dry and intact  Diffuse, cracked erythema along the left leg  Ulcerated lesion with exposed fat and tendon on anterior aspect of right ankle   Right foot cool to touch, pulses unable to be palpated    Neurological:      General: No focal deficit present.      Mental Status: He is alert. Mental status is at baseline.   Psychiatric:      Comments: Flat affect            Significant Labs: All pertinent labs within the past 24 hours have been reviewed.    Significant Imaging: I have reviewed all pertinent imaging results/findings within the past 24 hours.

## 2023-05-18 NOTE — PT/OT/SLP PROGRESS
"Occupational Therapy   Treatment    Name: Saji Castañeda  MRN: [id number]  Admitting Diagnosis:  Chronic osteomyelitis       Recommendations:     Discharge Recommendations: other (see comments)  Discharge Equipment Recommendations:     Barriers to discharge:       Assessment:     Saji Castañeda is a 73 y.o. male with a medical diagnosis of Chronic osteomyelitis.  He presents with the following performance deficits affecting function  weakness, impaired endurance, impaired functional mobility, decreased safety awareness, pain.     Pt agreeable to therapy and tolerated session well. Pt was cooperative but required extra time to initiate some tasks. Pt c/o pain in B ankles. Pt able to complete bed mobility with light assistance.     Rehab Prognosis:  Good; patient would benefit from acute skilled OT services to address these deficits and reach maximum level of function.       Plan:     Patient to be seen 2 x/week to address the above listed problems via self-care/home management, therapeutic activities, therapeutic exercises, neuromuscular re-education  Plan of Care Expires: 06/15/23  Plan of Care Reviewed with: patient    Subjective     Chief Complaint: pain in B ankles, itchy back  Patient/Family Comments/goals: "Just give me some time"  Pain/Comfort:  Pain Rating 1: 9/10  Location - Side 1: Bilateral  Location 1: ankle  Pain Addressed 1: Reposition, Distraction  Pain Rating Post-Intervention 1: other (see comments) (unrated)    Objective:     Communicated with: BEREKET Araujo prior to session.  Patient found HOB elevated with peripheral IV, PureWick upon OT entry to room.  A client care conference was completed by the OTR and the TAYLOR prior to treatment by the TAYLOR to discuss the patient's POC and current status.    General Precautions: Standard, fall    Orthopedic Precautions:RLE partial weight bearing, LLE non weight bearing (R WBAT through Heel only, transfers only)  Braces:  (darco shoe on R foot)  Respiratory Status: " Room air     Occupational Performance:     Bed Mobility:    Patient completed Rolling/Turning to Left with  stand by assistance  Patient completed Rolling/Turning to Right with stand by assistance  Patient completed Scooting/Bridging with stand by assistance  Patient completed Supine to Sit with stand by assistance  Patient completed Sit to Supine with stand by assistance   Pt performed 4 lateral scoots towards HOB, clearing buttocks from surface.     Functional Mobility/Transfers:  Functional Mobility: not performed d/t orthopedic precautions     Activities of Daily Living:  Grooming: stand by assistance seated EOB to complete oral hygiene and facial care      [facility] 6 Click ADL: 21    Treatment & Education:  Pt educated on OT POC and frequency during hospital stay.    Pt performed 4 lateral scoots towards HOB, clearing buttocks from surface.     Addressed all patient questions/concerns within TAYLOR scope of practice.    Patient left HOB elevated with all lines intact and call button in reach    GOALS:   Multidisciplinary Problems       Occupational Therapy Goals          Problem: Occupational Therapy    Goal Priority Disciplines Outcome Interventions   Occupational Therapy Goal     OT, PT/OT Ongoing, Progressing    Description: Goals to be met by: 05-26-23     Patient will increase functional independence with ADLs by performing:    LE Dressing with Set-up Assistance in supine  Toileting from bedside commode  (drop arm) with Modified Armstrong for hygiene and clothing management.   Toilet transfer to toilet with Modified Armstrong.  Pt. To be I with hEP to improve level of endurance for BUE                         Time Tracking:     OT Date of Treatment: 05/18/23  OT Start Time: 1003  OT Stop Time: 1028  OT Total Time (min): 25 min    Billable Minutes:Self Care/Home Management 8  Therapeutic Activity 13    OT/KATHLEEN: KATHLEEN     Number of KATHLEEN visits since last OT visit: 1    5/18/2023

## 2023-05-18 NOTE — ASSESSMENT & PLAN NOTE
Creatinine 1.7 on admit, recent baseline around 1.6 since March 2023, however was normal previous to this. Will treat as YNES, though could be progression of CKD. Resolved on admission     Plan:   Lab Results   Component Value Date    CREATININE 1.3 05/18/2023

## 2023-05-18 NOTE — PROGRESS NOTES
Day nurse Van made aware of pt refusal for transfer to [facility].Pt in room with no complaints at this time. Care rendered.

## 2023-05-18 NOTE — NURSING
Patient resting in bed today with some complaints of pain, pain meds given.    AAOx4, bed alarm on and call light in reach.

## 2023-05-18 NOTE — ASSESSMENT & PLAN NOTE
Patient admitted with significant hyperglycemia. Most recent fingerstick glucose reviewed-   Recent Labs   Lab 05/17/23  1546 05/17/23 2015 05/18/23  0754 05/18/23  1118   POCTGLUCOSE 286* 201* 277* 315*     On admission: HCO3 28, AG 10, Na 136 (corrected to 142), lactate 1.8. Not concerned for DKA given lack of acidosis, more consistent with HHS. Now off insulin gtt and on basal bolus regimen. Titrating as necessary

## 2023-05-18 NOTE — PROGRESS NOTES
[facility] ([place] [address])  Podiatry  Progress Note    Patient Name: Saji Castañeda  MRN: [id number]  Admission Date: 5/15/2023  Hospital Length of Stay: 2 days  Attending Physician: Jm Rosa MD  Primary Care Provider: [facility]     Subjective:     Interval History: Patient is seen laying in bed today. Patient admits to pain to his feet b/l. Patient denies any fever chills nausea or vomiting. Patient has no other pedal complaints at this time.         Scheduled Meds:   apixaban  5 mg Oral BID    atorvastatin  80 mg Oral Daily    clopidogreL  75 mg Oral Daily    collagenase   Topical (Top) Daily    insulin aspart U-100  8 Units Subcutaneous TIDWM    insulin detemir U-100  30 Units Subcutaneous QHS    isosorbide dinitrate  10 mg Oral TID    meropenem (MERREM) IVPB  1 g Intravenous Q8H    mupirocin   Nasal BID    NIFEdipine  60 mg Oral Daily    pantoprazole  40 mg Oral Daily    tamsulosin  0.4 mg Oral Daily     Continuous Infusions:  PRN Meds:acetaminophen, dextrose 10%, dextrose 10%, dextrose, dextrose, glucagon (human recombinant), insulin aspart U-100, melatonin, naloxone, oxyCODONE, sodium chloride 0.9%    Review of Systems   Constitutional:  Negative for appetite change, chills, diaphoresis and fever.   HENT:  Negative for dental problem, facial swelling and trouble swallowing.    Eyes:  Negative for pain and visual disturbance.   Respiratory:  Negative for cough and shortness of breath.    Cardiovascular:  Positive for leg swelling.   Gastrointestinal:  Negative for abdominal distention, abdominal pain, blood in stool, constipation, diarrhea, nausea and vomiting.   Genitourinary:  Negative for decreased urine volume, difficulty urinating, dysuria, flank pain, hematuria and urgency.   Musculoskeletal:  Negative for arthralgias, back pain, joint swelling and neck pain.   Skin:  Positive for color change and wound. Negative for rash.   Neurological:  Negative for weakness  and headaches.   Psychiatric/Behavioral:  Negative for confusion.    Objective:     Vital Signs (Most Recent):  Temp: 98 °F (36.7 °C) (05/17/23 2013)  Pulse: (!) 55 (05/17/23 2013)  Resp: 18 (05/17/23 2013)  BP: (!) 159/72 (05/17/23 2013)  SpO2: 98 % (05/17/23 2013) Vital Signs (24h Range):  Temp:  [97.2 °F (36.2 °C)-98 °F (36.7 °C)] 98 °F (36.7 °C)  Pulse:  [55-64] 55  Resp:  [18-20] 18  SpO2:  [94 %-98 %] 98 %  BP: (148-165)/(64-72) 159/72     Weight: 110.5 kg (243 lb 9.7 oz)  Body mass index is 35.97 kg/m².    Foot Exam    Right Foot/Ankle     Inspection and Palpation  Skin Exam: dry skin, fissure, skin changes, abnormal color and ulcer;     Neurovascular  Dorsalis pedis: absent  Posterior tibial: absent  Saphenous nerve sensation: diminished  Tibial nerve sensation: diminished  Superficial peroneal nerve sensation: diminished  Deep peroneal nerve sensation: diminished  Sural nerve sensation: diminished    Comments  Right anterior ankle full thickness ulceration with anterior tibial tendon exposed. Tendon appears desiccated, wound base has thick yellow biofilm and slough. Slight mal odor noted. No purulence or active drainage. No erythema or edema. No increase in local warmth. Probes deep to bone.     Left Foot/Ankle      Inspection and Palpation  Skin Exam: dry skin, skin changes and ulcer;     Neurovascular  Dorsalis pedis: absent  Posterior tibial: absent  Saphenous nerve sensation: diminished  Tibial nerve sensation: diminished  Superficial peroneal nerve sensation: diminished  Deep peroneal nerve sensation: diminished  Sural nerve sensation: diminished    Comments  Full thickness heel wound 70% fibrotic, 30% granular, no active drainage, dense layer of slough, no purulence, no direct deep probe to bone.      Several blisters noted on anterior leg      Posterior full thickness leg wound 70% fibrotic, 30% granular, no active drainage, dense layer of slough, no purulence, no direct deep probe to bone.                                  Laboratory:  A1C:   Recent Labs   Lab 03/10/23  0608   HGBA1C 8.4*     Blood Cultures: No results for input(s): LABBLOO in the last 48 hours.  CBC:   Recent Labs   Lab 05/17/23  0445   WBC 6.32   RBC 3.15*   HGB 8.3*   HCT 27.1*      MCV 86   MCH 26.3*   MCHC 30.6*     CMP:   Recent Labs   Lab 05/15/23  1416 05/15/23  2025 05/17/23  1940   *   < > 201*   CALCIUM 9.7   < > 9.2   ALBUMIN 2.7*  --   --    PROT 7.8  --   --       < > 140   K 4.5   < > 4.5   CO2 28   < > 30*   CL 98   < > 104   BUN 41*   < > 25*   CREATININE 1.7*   < > 1.5*   ALKPHOS 73  --   --    ALT 13  --   --    AST 14  --   --    BILITOT 0.3  --   --     < > = values in this interval not displayed.     Coagulation: No results for input(s): PT, INR, APTT in the last 168 hours.  CRP:   Recent Labs   Lab 05/15/23  1416   CRP 50.5*     ESR:   Recent Labs   Lab 05/15/23  1058   SEDRATE 105*     Prealbumin: No results for input(s): PREALBUMIN in the last 48 hours.  Wound Cultures:   Recent Labs   Lab 03/10/23  1741   LABAERO PROTEUS MIRABILIS  Few  No other significant isolate  *     Microbiology Results (last 7 days)       Procedure Component Value Units Date/Time    Urine culture [[id number]]  (Abnormal)  (Susceptibility) Collected: 05/15/23 1701    Order Status: Completed Specimen: Urine Updated: 05/17/23 2143     Urine Culture, Routine KLEBSIELLA PNEUMONIAE  10,000 - 49,999 cfu/ml      Narrative:      Specimen Source->Urine    Blood culture [[id number]] Collected: 05/15/23 2105    Order Status: Completed Specimen: Blood Updated: 05/16/23 2222     Blood Culture, Routine No Growth to date      No Growth to date    Narrative:      Collection has been rescheduled by DF2 at 05/15/2023 20:30 Reason:   Parker CUBA  patient requesting pain medication.  Collection has been rescheduled by DF2 at 05/15/2023 20:30 Reason:   Justice RN  patient requesting pain medication.    Blood culture [[id number]] Collected:  05/15/23 2024    Order Status: Completed Specimen: Blood from Peripheral, Left Hand Updated: 05/16/23 2212     Blood Culture, Routine No Growth to date      No Growth to date    Aerobic culture [[id number]] Collected: 05/16/23 1451    Order Status: Sent Specimen: Wound from Foot, Right Updated: 05/16/23 1751    Aerobic culture [[id number]] Collected: 05/16/23 1451    Order Status: Sent Specimen: Wound from Foot, Right Updated: 05/16/23 1750    Culture, Anaerobe [[id number]] Collected: 05/16/23 1451    Order Status: Sent Specimen: Wound from Foot, Right Updated: 05/16/23 1750    Culture, Anaerobe [[id number]] Collected: 05/16/23 1451    Order Status: Sent Specimen: Wound from Foot, Right Updated: 05/16/23 1749    Culture, Anaerobe [[id number]] Collected: 05/16/23 1451    Order Status: Sent Specimen: Wound from Foot, Right Updated: 05/16/23 1452    Aerobic culture [[id number]] Collected: 05/16/23 1451    Order Status: Canceled Specimen: Wound from Foot, Right           Specimen (24h ago, onward)      None              Assessment/Plan:     ID  Osteomyelitis of right lower extremity  See rest of plan     Osteomyelitis of left lower extremity  See rest of plan     Orthopedic  Nonhealing ulcer of right lower leg with fat layer exposed  IDSA moderate diabetic foot infection with bilateral full-thickness ulcerations of the lower extremity and osteomyelitis. Right foot MRI with findings concerning for early osteomyelitis of the navicular and medial cuneiform.  Left foot MRI indicates posterior calcaneal osteomyelitis.  Right foot x-ray with chronic changes in the 5th metatarsal.  Left foot x-ray with potential solitary bubble of soft tissue emphysema just inferior to the calcaneus.  ABIs were ordered and deemed unreliable due to noncompressible calcified vessels.  Bilateral lower extremity ultrasound without evidence for vascular occlusion or hemodynamically significant stenosis of the right or left lower extremity arterial  system.  Vascular surgery assess the patient and deemed that he was not a revascularization candidate due to nonambulatory status, noting patient can no longer heel BKA is now recommending bilateral AKA's.  Vascular surgery notes the patient is not amenable.  When I spoke with patient he seemed willing to consider the possibility. Discussed with hospital medicine and Dr. Barlow, and decided to transfer patient to [facility] for interventional cardiology workup      Patient to be transferred to [facility] for interventional cardiology  Right anterior ankle wound cultures obtained in addition to left plantar wound cultures as well  Santyl enzymatic wound debridment ontment ordered   Hydrafera blue ready ordered   Heel offloading boots ordered, to be wore at all times   Wounds cleansed and dressed per podiatry   Excisional wound debridment of right anterior ankle, necrosing tendon exposed was excsied as serves for path for infection   Antibiotics per primary/ID   Nursing wound care routine ordered  Podiatry will follow        Herminia Lauren DPM PGY-1  Podiatric Medicine & Surgery  [facility]  Secure Chat Preferred  Mobile: [phone]  Pager: [phone]

## 2023-05-18 NOTE — ASSESSMENT & PLAN NOTE
Home regimen: hydralazine 25mg TID, isordil 10mg TID, nifedipine 60 daily, hyzaar 100-25 daily     - continue nifedipine, isordil, and hyzaar

## 2023-05-18 NOTE — ASSESSMENT & PLAN NOTE
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 He noted that his wound wrappings came loose, and he asked his wound care nurses to bring him to the ED. He endorses leg pain, otherwise no signs of systemic infection. On arrival, he is AF, WBC 8.45, lactate WNL. , CRP 50.5. Suspect progression of chronic OM likely due to diabetic nephropathy with poor wound healing due to PVD/PAD.   - Notably, he also required 6 weeks of IV daptomycin in 7/2022, as well as 6 weeks of IV vanc and ceftriaxone 3/2023. Unclear if he ever fully healed after these treatments. Previous biopsies notably positive for proteus, pseudomonas, porphyromonas, prevotella, strep agalactiae, MRSA, and E. cloacae  -Bilateral foot XR on admission with soft tissue defect along the dorsal aspect of the right midfoot, and irregularity and patchy lucency in the calcaneus, talus, and distal tibia of the left foot. MRI bilateral lower extremities pending.  - Blood cx NGTD  - ID does not recommend antibiotic therapy given his degree of vascular injury     Plan:  - Plan to transfer to [place] for further evaluation, as all options for medical management have been exhausted   - Appreciate assistance of podiatry and wound care

## 2023-05-18 NOTE — ASSESSMENT & PLAN NOTE
Patient's FSGs are uncontrolled due to hyperglycemia on current medication regimen.  Last A1c reviewed-   Lab Results   Component Value Date    HGBA1C 8.4 (H) 03/10/2023     Most recent fingerstick glucose reviewed-   Recent Labs   Lab 05/17/23  1546 05/17/23 2015 05/18/23  0754 05/18/23  1118   POCTGLUCOSE 286* 201* 277* 315*     Current correctional scale  Medium  Titrate anti-hyperglycemic dose as follows-   Antihyperglycemics (From admission, onward)    Start     Stop Route Frequency Ordered    05/18/23 2100  insulin detemir U-100 (Levemir) pen 34 Units         -- SubQ Nightly 05/18/23 0733    05/18/23 1130  insulin aspart U-100 pen 10 Units         -- SubQ 3 times daily with meals 05/18/23 0733    05/15/23 1608  insulin aspart U-100 pen 1-10 Units         -- SubQ Before meals & nightly PRN 05/15/23 1509        Hold Oral hypoglycemics while patient is in the hospital. Takes metformin and glipizide at home, as well as 68 units long acting insulin qHS. Patient reports compliance, states he does not take any meal time insulin

## 2023-05-18 NOTE — PLAN OF CARE
05/18/23 1113   Discharge Reassessment   Assessment Type Discharge Planning Reassessment   Discharge Plan discussed with: Sibling   Name(s) and Number(s) Kandy Castañeda (Sister)   [phone] (Mobile   Discharge Plan A Home Health   Discharge Plan B Home with family   Transition of Care Barriers None   Why the patient remains in the hospital Requires continued medical care   Post-Acute Status   Discharge Delays None known at this time     CM spoke to sister, Kandy Castañeda (Sister)  [phone] (Mobile] and informed of discharge planning home with family and home health.      LEW spoke with [facility]  to discuss discharge planning home with family and home health     Keerthi Alex RN  Case Management  [facility]  [phone]

## 2023-05-18 NOTE — SUBJECTIVE & OBJECTIVE
Subjective:     Interval History: Patient is seen laying in bed today. Patient admits to pain to his feet b/l. Patient denies any fever chills nausea or vomiting. Patient has no other pedal complaints at this time.         Scheduled Meds:   apixaban  5 mg Oral BID    atorvastatin  80 mg Oral Daily    clopidogreL  75 mg Oral Daily    collagenase   Topical (Top) Daily    insulin aspart U-100  8 Units Subcutaneous TIDWM    insulin detemir U-100  30 Units Subcutaneous QHS    isosorbide dinitrate  10 mg Oral TID    meropenem (MERREM) IVPB  1 g Intravenous Q8H    mupirocin   Nasal BID    NIFEdipine  60 mg Oral Daily    pantoprazole  40 mg Oral Daily    tamsulosin  0.4 mg Oral Daily     Continuous Infusions:  PRN Meds:acetaminophen, dextrose 10%, dextrose 10%, dextrose, dextrose, glucagon (human recombinant), insulin aspart U-100, melatonin, naloxone, oxyCODONE, sodium chloride 0.9%    Review of Systems   Constitutional:  Negative for appetite change, chills, diaphoresis and fever.   HENT:  Negative for dental problem, facial swelling and trouble swallowing.    Eyes:  Negative for pain and visual disturbance.   Respiratory:  Negative for cough and shortness of breath.    Cardiovascular:  Positive for leg swelling.   Gastrointestinal:  Negative for abdominal distention, abdominal pain, blood in stool, constipation, diarrhea, nausea and vomiting.   Genitourinary:  Negative for decreased urine volume, difficulty urinating, dysuria, flank pain, hematuria and urgency.   Musculoskeletal:  Negative for arthralgias, back pain, joint swelling and neck pain.   Skin:  Positive for color change and wound. Negative for rash.   Neurological:  Negative for weakness and headaches.   Psychiatric/Behavioral:  Negative for confusion.    Objective:     Vital Signs (Most Recent):  Temp: 98 °F (36.7 °C) (05/17/23 2013)  Pulse: (!) 55 (05/17/23 2013)  Resp: 18 (05/17/23 2013)  BP: (!) 159/72 (05/17/23 2013)  SpO2: 98 % (05/17/23 2013) Vital  Signs (24h Range):  Temp:  [97.2 °F (36.2 °C)-98 °F (36.7 °C)] 98 °F (36.7 °C)  Pulse:  [55-64] 55  Resp:  [18-20] 18  SpO2:  [94 %-98 %] 98 %  BP: (148-165)/(64-72) 159/72     Weight: 110.5 kg (243 lb 9.7 oz)  Body mass index is 35.97 kg/m².    Foot Exam    Right Foot/Ankle     Inspection and Palpation  Skin Exam: dry skin, fissure, skin changes, abnormal color and ulcer;     Neurovascular  Dorsalis pedis: absent  Posterior tibial: absent  Saphenous nerve sensation: diminished  Tibial nerve sensation: diminished  Superficial peroneal nerve sensation: diminished  Deep peroneal nerve sensation: diminished  Sural nerve sensation: diminished    Comments  Right anterior ankle full thickness ulceration with anterior tibial tendon exposed. Tendon appears desiccated, wound base has thick yellow biofilm and slough. Slight mal odor noted. No purulence or active drainage. No erythema or edema. No increase in local warmth. Probes deep to bone.     Left Foot/Ankle      Inspection and Palpation  Skin Exam: dry skin, skin changes and ulcer;     Neurovascular  Dorsalis pedis: absent  Posterior tibial: absent  Saphenous nerve sensation: diminished  Tibial nerve sensation: diminished  Superficial peroneal nerve sensation: diminished  Deep peroneal nerve sensation: diminished  Sural nerve sensation: diminished    Comments  Full thickness heel wound 70% fibrotic, 30% granular, no active drainage, dense layer of slough, no purulence, no direct deep probe to bone.      Several blisters noted on anterior leg      Posterior full thickness leg wound 70% fibrotic, 30% granular, no active drainage, dense layer of slough, no purulence, no direct deep probe to bone.                                 Laboratory:  A1C:   Recent Labs   Lab 03/10/23  0608   HGBA1C 8.4*     Blood Cultures: No results for input(s): LABBLOO in the last 48 hours.  CBC:   Recent Labs   Lab 05/17/23  0445   WBC 6.32   RBC 3.15*   HGB 8.3*   HCT 27.1*      MCV 86    MCH 26.3*   MCHC 30.6*     CMP:   Recent Labs   Lab 05/15/23  1416 05/15/23  2025 05/17/23  1940   *   < > 201*   CALCIUM 9.7   < > 9.2   ALBUMIN 2.7*  --   --    PROT 7.8  --   --       < > 140   K 4.5   < > 4.5   CO2 28   < > 30*   CL 98   < > 104   BUN 41*   < > 25*   CREATININE 1.7*   < > 1.5*   ALKPHOS 73  --   --    ALT 13  --   --    AST 14  --   --    BILITOT 0.3  --   --     < > = values in this interval not displayed.     Coagulation: No results for input(s): PT, INR, APTT in the last 168 hours.  CRP:   Recent Labs   Lab 05/15/23  1416   CRP 50.5*     ESR:   Recent Labs   Lab 05/15/23  1058   SEDRATE 105*     Prealbumin: No results for input(s): PREALBUMIN in the last 48 hours.  Wound Cultures:   Recent Labs   Lab 03/10/23  1741   LABAERO PROTEUS MIRABILIS  Few  No other significant isolate  *     Microbiology Results (last 7 days)       Procedure Component Value Units Date/Time    Urine culture [[id number]]  (Abnormal)  (Susceptibility) Collected: 05/15/23 1701    Order Status: Completed Specimen: Urine Updated: 05/17/23 2143     Urine Culture, Routine KLEBSIELLA PNEUMONIAE  10,000 - 49,999 cfu/ml      Narrative:      Specimen Source->Urine    Blood culture [[id number]] Collected: 05/15/23 2105    Order Status: Completed Specimen: Blood Updated: 05/16/23 2222     Blood Culture, Routine No Growth to date      No Growth to date    Narrative:      Collection has been rescheduled by DF2 at 05/15/2023 20:30 Reason:   Parker CUBA  patient requesting pain medication.  Collection has been rescheduled by DF2 at 05/15/2023 20:30 Reason:   Parker CUBA  patient requesting pain medication.    Blood culture [[id number]] Collected: 05/15/23 2024    Order Status: Completed Specimen: Blood from Peripheral, Left Hand Updated: 05/16/23 2212     Blood Culture, Routine No Growth to date      No Growth to date    Aerobic culture [[id number]] Collected: 05/16/23 1451    Order Status: Sent Specimen: Wound from Foot,  Right Updated: 05/16/23 1751    Aerobic culture [[id number]] Collected: 05/16/23 1451    Order Status: Sent Specimen: Wound from Foot, Right Updated: 05/16/23 1750    Culture, Anaerobe [[id number]] Collected: 05/16/23 1451    Order Status: Sent Specimen: Wound from Foot, Right Updated: 05/16/23 1750    Culture, Anaerobe [[id number]] Collected: 05/16/23 1451    Order Status: Sent Specimen: Wound from Foot, Right Updated: 05/16/23 1749    Culture, Anaerobe [[id number]] Collected: 05/16/23 1451    Order Status: Sent Specimen: Wound from Foot, Right Updated: 05/16/23 1452    Aerobic culture [[id number]] Collected: 05/16/23 1451    Order Status: Canceled Specimen: Wound from Foot, Right           Specimen (24h ago, onward)      None

## 2023-05-18 NOTE — ASSESSMENT & PLAN NOTE
UA on admission with 3+ leukocytes, > 100 WBC, many bacteria. He endorses frequency and suprapubic tenderness; no dysuria, urgency, or change in odor of his urine.  - History of ESBL UTI as recently as 2022  - Urine culture this admission with pan sensitive klebsiella \    - Deescalate meropenem to CTX

## 2023-05-18 NOTE — ASSESSMENT & PLAN NOTE
MARIO 2019, right side 0.9, left side unable to be obtained. 2/2021 bilateral angiography with no targetable intervention. Has not seen vascular surgery outpatient since Jan 2021.   - Vasc sx consulted, who offered AKA. Patient declined. They have now signed off. Appreciate their help  - ABIs unreliable due to non compressible vessels    - On asa/plavix at home. Will hold asa, continue plavix and eliquis (see DVT)  - Planning to transfer to [place] for further vascular surgery evaluation

## 2023-05-19 ENCOUNTER — HOSPITAL ENCOUNTER (INPATIENT)
Facility: HOSPITAL | Age: 73
LOS: 6 days | Discharge: HOME HEALTH | DRG: 638 | End: 2023-05-25
Attending: STUDENT IN AN ORGANIZED HEALTH CARE EDUCATION/TRAINING PROGRAM | Admitting: STUDENT IN AN ORGANIZED HEALTH CARE EDUCATION/TRAINING PROGRAM
Payer: MEDICARE

## 2023-05-19 VITALS
DIASTOLIC BLOOD PRESSURE: 67 MMHG | OXYGEN SATURATION: 100 % | BODY MASS INDEX: 35.03 KG/M2 | HEART RATE: 60 BPM | WEIGHT: 244.69 LBS | TEMPERATURE: 98 F | RESPIRATION RATE: 18 BRPM | HEIGHT: 70 IN | SYSTOLIC BLOOD PRESSURE: 155 MMHG

## 2023-05-19 DIAGNOSIS — M86.9 OSTEOMYELITIS: ICD-10-CM

## 2023-05-19 DIAGNOSIS — S91.301D OPEN WOUND OF RIGHT FOOT WITH COMPLICATION, SUBSEQUENT ENCOUNTER: Primary | ICD-10-CM

## 2023-05-19 DIAGNOSIS — M86.60 CHRONIC OSTEOMYELITIS: ICD-10-CM

## 2023-05-19 DIAGNOSIS — R00.1 BRADYCARDIA: ICD-10-CM

## 2023-05-19 DIAGNOSIS — I73.9 PVD (PERIPHERAL VASCULAR DISEASE): ICD-10-CM

## 2023-05-19 DIAGNOSIS — M86.9 OSTEOMYELITIS OF RIGHT LOWER EXTREMITY: ICD-10-CM

## 2023-05-19 DIAGNOSIS — R00.1 SINUS BRADYCARDIA: ICD-10-CM

## 2023-05-19 DIAGNOSIS — R07.9 CHEST PAIN: ICD-10-CM

## 2023-05-19 PROBLEM — I82.721 CHRONIC DEEP VEIN THROMBOSIS (DVT) OF RIGHT UPPER EXTREMITY: Status: ACTIVE | Noted: 2022-07-06

## 2023-05-19 LAB
ANION GAP SERPL CALC-SCNC: 8 MMOL/L (ref 8–16)
BACTERIA SPEC AEROBE CULT: ABNORMAL
BASOPHILS # BLD AUTO: 0.04 K/UL (ref 0–0.2)
BASOPHILS NFR BLD: 0.6 % (ref 0–1.9)
BUN SERPL-MCNC: 21 MG/DL (ref 8–23)
CALCIUM SERPL-MCNC: 9 MG/DL (ref 8.7–10.5)
CHLORIDE SERPL-SCNC: 100 MMOL/L (ref 95–110)
CO2 SERPL-SCNC: 28 MMOL/L (ref 23–29)
CREAT SERPL-MCNC: 1.2 MG/DL (ref 0.5–1.4)
DIFFERENTIAL METHOD: ABNORMAL
EOSINOPHIL # BLD AUTO: 0.4 K/UL (ref 0–0.5)
EOSINOPHIL NFR BLD: 5.5 % (ref 0–8)
ERYTHROCYTE [DISTWIDTH] IN BLOOD BY AUTOMATED COUNT: 17 % (ref 11.5–14.5)
EST. GFR  (NO RACE VARIABLE): >60 ML/MIN/1.73 M^2
GLUCOSE SERPL-MCNC: 315 MG/DL (ref 70–110)
HCT VFR BLD AUTO: 27.3 % (ref 40–54)
HGB BLD-MCNC: 8.3 G/DL (ref 14–18)
IMM GRANULOCYTES # BLD AUTO: 0.02 K/UL (ref 0–0.04)
IMM GRANULOCYTES NFR BLD AUTO: 0.3 % (ref 0–0.5)
LYMPHOCYTES # BLD AUTO: 1.4 K/UL (ref 1–4.8)
LYMPHOCYTES NFR BLD: 21.9 % (ref 18–48)
MAGNESIUM SERPL-MCNC: 1.8 MG/DL (ref 1.6–2.6)
MCH RBC QN AUTO: 26.5 PG (ref 27–31)
MCHC RBC AUTO-ENTMCNC: 30.4 G/DL (ref 32–36)
MCV RBC AUTO: 87 FL (ref 82–98)
MONOCYTES # BLD AUTO: 0.6 K/UL (ref 0.3–1)
MONOCYTES NFR BLD: 8.9 % (ref 4–15)
NEUTROPHILS # BLD AUTO: 4.1 K/UL (ref 1.8–7.7)
NEUTROPHILS NFR BLD: 62.8 % (ref 38–73)
NRBC BLD-RTO: 0 /100 WBC
PHOSPHATE SERPL-MCNC: 3.4 MG/DL (ref 2.7–4.5)
PLATELET # BLD AUTO: 323 K/UL (ref 150–450)
PMV BLD AUTO: 10.2 FL (ref 9.2–12.9)
POCT GLUCOSE: 144 MG/DL (ref 70–110)
POCT GLUCOSE: 226 MG/DL (ref 70–110)
POCT GLUCOSE: 236 MG/DL (ref 70–110)
POCT GLUCOSE: 277 MG/DL (ref 70–110)
POCT GLUCOSE: 98 MG/DL (ref 70–110)
POTASSIUM SERPL-SCNC: 4.2 MMOL/L (ref 3.5–5.1)
RBC # BLD AUTO: 3.13 M/UL (ref 4.6–6.2)
SODIUM SERPL-SCNC: 136 MMOL/L (ref 136–145)
WBC # BLD AUTO: 6.53 K/UL (ref 3.9–12.7)

## 2023-05-19 PROCEDURE — 99223 1ST HOSP IP/OBS HIGH 75: CPT | Mod: ,,, | Performed by: NURSE PRACTITIONER

## 2023-05-19 PROCEDURE — 1111F PR DISCHARGE MEDS RECONCILED W/ CURRENT OUTPATIENT MED LIST: ICD-10-PCS | Mod: CPTII,GC,, | Performed by: STUDENT IN AN ORGANIZED HEALTH CARE EDUCATION/TRAINING PROGRAM

## 2023-05-19 PROCEDURE — 25000003 PHARM REV CODE 250

## 2023-05-19 PROCEDURE — 99223 PR INITIAL HOSPITAL CARE,LEVL III: ICD-10-PCS | Mod: ,,, | Performed by: INTERNAL MEDICINE

## 2023-05-19 PROCEDURE — 25000003 PHARM REV CODE 250: Performed by: STUDENT IN AN ORGANIZED HEALTH CARE EDUCATION/TRAINING PROGRAM

## 2023-05-19 PROCEDURE — 63600175 PHARM REV CODE 636 W HCPCS: Performed by: STUDENT IN AN ORGANIZED HEALTH CARE EDUCATION/TRAINING PROGRAM

## 2023-05-19 PROCEDURE — 80048 BASIC METABOLIC PNL TOTAL CA: CPT

## 2023-05-19 PROCEDURE — 85025 COMPLETE CBC W/AUTO DIFF WBC: CPT

## 2023-05-19 PROCEDURE — 99239 PR HOSPITAL DISCHARGE DAY,>30 MIN: ICD-10-PCS | Mod: GC,,, | Performed by: STUDENT IN AN ORGANIZED HEALTH CARE EDUCATION/TRAINING PROGRAM

## 2023-05-19 PROCEDURE — 99223 1ST HOSP IP/OBS HIGH 75: CPT | Mod: ,,, | Performed by: INTERNAL MEDICINE

## 2023-05-19 PROCEDURE — 84100 ASSAY OF PHOSPHORUS: CPT

## 2023-05-19 PROCEDURE — 83735 ASSAY OF MAGNESIUM: CPT

## 2023-05-19 PROCEDURE — 36415 COLL VENOUS BLD VENIPUNCTURE: CPT

## 2023-05-19 PROCEDURE — 99223 PR INITIAL HOSPITAL CARE,LEVL III: ICD-10-PCS | Mod: ,,, | Performed by: NURSE PRACTITIONER

## 2023-05-19 PROCEDURE — 11000001 HC ACUTE MED/SURG PRIVATE ROOM

## 2023-05-19 PROCEDURE — 99239 HOSP IP/OBS DSCHRG MGMT >30: CPT | Mod: GC,,, | Performed by: STUDENT IN AN ORGANIZED HEALTH CARE EDUCATION/TRAINING PROGRAM

## 2023-05-19 PROCEDURE — 1111F DSCHRG MED/CURRENT MED MERGE: CPT | Mod: CPTII,GC,, | Performed by: STUDENT IN AN ORGANIZED HEALTH CARE EDUCATION/TRAINING PROGRAM

## 2023-05-19 RX ORDER — SODIUM CHLORIDE 0.9 % (FLUSH) 0.9 %
10 SYRINGE (ML) INJECTION EVERY 12 HOURS PRN
Status: DISCONTINUED | OUTPATIENT
Start: 2023-05-19 | End: 2023-05-25 | Stop reason: HOSPADM

## 2023-05-19 RX ORDER — GLUCAGON 1 MG
1 KIT INJECTION
Status: DISCONTINUED | OUTPATIENT
Start: 2023-05-19 | End: 2023-05-25 | Stop reason: HOSPADM

## 2023-05-19 RX ORDER — INSULIN ASPART 100 [IU]/ML
15 INJECTION, SOLUTION INTRAVENOUS; SUBCUTANEOUS
Status: DISCONTINUED | OUTPATIENT
Start: 2023-05-19 | End: 2023-05-20

## 2023-05-19 RX ORDER — NALOXONE HCL 0.4 MG/ML
0.02 VIAL (ML) INJECTION
Status: DISCONTINUED | OUTPATIENT
Start: 2023-05-19 | End: 2023-05-25 | Stop reason: HOSPADM

## 2023-05-19 RX ORDER — ISOSORBIDE DINITRATE 10 MG/1
10 TABLET ORAL 3 TIMES DAILY
Status: DISCONTINUED | OUTPATIENT
Start: 2023-05-19 | End: 2023-05-25 | Stop reason: HOSPADM

## 2023-05-19 RX ORDER — DEXTROSE 40 %
15 GEL (GRAM) ORAL
Status: DISCONTINUED | OUTPATIENT
Start: 2023-05-19 | End: 2023-05-25 | Stop reason: HOSPADM

## 2023-05-19 RX ORDER — INSULIN ASPART 100 [IU]/ML
13 INJECTION, SOLUTION INTRAVENOUS; SUBCUTANEOUS
Status: DISCONTINUED | OUTPATIENT
Start: 2023-05-19 | End: 2023-05-19

## 2023-05-19 RX ORDER — TALC
6 POWDER (GRAM) TOPICAL NIGHTLY PRN
Status: DISCONTINUED | OUTPATIENT
Start: 2023-05-19 | End: 2023-05-25 | Stop reason: HOSPADM

## 2023-05-19 RX ORDER — CLOPIDOGREL BISULFATE 75 MG/1
75 TABLET ORAL DAILY
Status: DISCONTINUED | OUTPATIENT
Start: 2023-05-20 | End: 2023-05-25 | Stop reason: HOSPADM

## 2023-05-19 RX ORDER — INSULIN ASPART 100 [IU]/ML
13 INJECTION, SOLUTION INTRAVENOUS; SUBCUTANEOUS
Status: DISCONTINUED | OUTPATIENT
Start: 2023-05-19 | End: 2023-05-19 | Stop reason: HOSPADM

## 2023-05-19 RX ORDER — INSULIN ASPART 100 [IU]/ML
1-10 INJECTION, SOLUTION INTRAVENOUS; SUBCUTANEOUS
Status: DISCONTINUED | OUTPATIENT
Start: 2023-05-19 | End: 2023-05-25 | Stop reason: HOSPADM

## 2023-05-19 RX ORDER — LOSARTAN POTASSIUM 50 MG/1
100 TABLET ORAL DAILY
Status: DISCONTINUED | OUTPATIENT
Start: 2023-05-19 | End: 2023-05-25 | Stop reason: HOSPADM

## 2023-05-19 RX ORDER — TAMSULOSIN HYDROCHLORIDE 0.4 MG/1
0.4 CAPSULE ORAL DAILY
Status: DISCONTINUED | OUTPATIENT
Start: 2023-05-20 | End: 2023-05-25 | Stop reason: HOSPADM

## 2023-05-19 RX ORDER — PANTOPRAZOLE SODIUM 40 MG/1
40 TABLET, DELAYED RELEASE ORAL DAILY
Status: DISCONTINUED | OUTPATIENT
Start: 2023-05-20 | End: 2023-05-25 | Stop reason: HOSPADM

## 2023-05-19 RX ORDER — MUPIROCIN 20 MG/G
OINTMENT TOPICAL 2 TIMES DAILY
Status: COMPLETED | OUTPATIENT
Start: 2023-05-19 | End: 2023-05-20

## 2023-05-19 RX ORDER — NIFEDIPINE 60 MG/1
60 TABLET, EXTENDED RELEASE ORAL DAILY
Status: DISCONTINUED | OUTPATIENT
Start: 2023-05-20 | End: 2023-05-25 | Stop reason: HOSPADM

## 2023-05-19 RX ORDER — ACETAMINOPHEN 325 MG/1
650 TABLET ORAL EVERY 6 HOURS PRN
Status: DISCONTINUED | OUTPATIENT
Start: 2023-05-19 | End: 2023-05-25 | Stop reason: HOSPADM

## 2023-05-19 RX ORDER — OXYCODONE HYDROCHLORIDE 5 MG/1
5 TABLET ORAL EVERY 6 HOURS PRN
Status: DISCONTINUED | OUTPATIENT
Start: 2023-05-19 | End: 2023-05-25 | Stop reason: HOSPADM

## 2023-05-19 RX ORDER — DEXTROSE 40 %
30 GEL (GRAM) ORAL
Status: DISCONTINUED | OUTPATIENT
Start: 2023-05-19 | End: 2023-05-25 | Stop reason: HOSPADM

## 2023-05-19 RX ORDER — ATORVASTATIN CALCIUM 40 MG/1
80 TABLET, FILM COATED ORAL DAILY
Status: DISCONTINUED | OUTPATIENT
Start: 2023-05-20 | End: 2023-05-25 | Stop reason: HOSPADM

## 2023-05-19 RX ADMIN — OXYCODONE HYDROCHLORIDE 5 MG: 5 TABLET ORAL at 03:05

## 2023-05-19 RX ADMIN — INSULIN ASPART 6 UNITS: 100 INJECTION, SOLUTION INTRAVENOUS; SUBCUTANEOUS at 08:05

## 2023-05-19 RX ADMIN — ISOSORBIDE DINITRATE 10 MG: 10 TABLET ORAL at 04:05

## 2023-05-19 RX ADMIN — Medication 6 MG: at 09:05

## 2023-05-19 RX ADMIN — LOSARTAN POTASSIUM 100 MG: 50 TABLET, FILM COATED ORAL at 12:05

## 2023-05-19 RX ADMIN — APIXABAN 5 MG: 5 TABLET, FILM COATED ORAL at 08:05

## 2023-05-19 RX ADMIN — COLLAGENASE SANTYL: 250 OINTMENT TOPICAL at 08:05

## 2023-05-19 RX ADMIN — ISOSORBIDE DINITRATE 10 MG: 10 TABLET ORAL at 08:05

## 2023-05-19 RX ADMIN — PANTOPRAZOLE SODIUM 40 MG: 40 TABLET, DELAYED RELEASE ORAL at 08:05

## 2023-05-19 RX ADMIN — INSULIN ASPART 13 UNITS: 100 INJECTION, SOLUTION INTRAVENOUS; SUBCUTANEOUS at 08:05

## 2023-05-19 RX ADMIN — MUPIROCIN: 20 OINTMENT TOPICAL at 08:05

## 2023-05-19 RX ADMIN — CEFTRIAXONE 1 G: 1 INJECTION, POWDER, FOR SOLUTION INTRAMUSCULAR; INTRAVENOUS at 12:05

## 2023-05-19 RX ADMIN — TAMSULOSIN HYDROCHLORIDE 0.4 MG: 0.4 CAPSULE ORAL at 08:05

## 2023-05-19 RX ADMIN — INSULIN DETEMIR 25 UNITS: 100 INJECTION, SOLUTION SUBCUTANEOUS at 12:05

## 2023-05-19 RX ADMIN — OXYCODONE HYDROCHLORIDE 5 MG: 5 TABLET ORAL at 08:05

## 2023-05-19 RX ADMIN — ATORVASTATIN CALCIUM 80 MG: 40 TABLET, FILM COATED ORAL at 08:05

## 2023-05-19 RX ADMIN — CLOPIDOGREL BISULFATE 75 MG: 75 TABLET ORAL at 08:05

## 2023-05-19 RX ADMIN — INSULIN ASPART 4 UNITS: 100 INJECTION, SOLUTION INTRAVENOUS; SUBCUTANEOUS at 12:05

## 2023-05-19 RX ADMIN — INSULIN ASPART 15 UNITS: 100 INJECTION, SOLUTION INTRAVENOUS; SUBCUTANEOUS at 12:05

## 2023-05-19 RX ADMIN — ISOSORBIDE DINITRATE 10 MG: 10 TABLET ORAL at 09:05

## 2023-05-19 RX ADMIN — APIXABAN 5 MG: 5 TABLET, FILM COATED ORAL at 09:05

## 2023-05-19 RX ADMIN — NIFEDIPINE 60 MG: 30 TABLET, FILM COATED, EXTENDED RELEASE ORAL at 08:05

## 2023-05-19 RX ADMIN — INSULIN ASPART 15 UNITS: 100 INJECTION, SOLUTION INTRAVENOUS; SUBCUTANEOUS at 04:05

## 2023-05-19 RX ADMIN — MUPIROCIN: 20 OINTMENT TOPICAL at 09:05

## 2023-05-19 NOTE — PLAN OF CARE
05/19/23 0908   Final Note   Assessment Type Final Discharge Note   Anticipated Discharge Disposition Other Fac [place]  ([facility] [phone])   What phone number can be called within the next 1-3 days to see how you are doing after discharge? [phone]   Post-Acute Status   Post-Acute Authorization Other   Coverage [employer] MANAGED MEDICARE - [employer] [facility] HMO PPO SPECIAL NEEDS   Other Status See Comments   Discharge Delays None known at this time       CM spoke with patient re: discharging to [facility] and patient was in agreement.  Cm called and spoke to sister, Kandy Castañeda re:  facility transfer and verbalized an understanding.    CM sent an secure e-mail to assigned CM with [facility] Tiffanie Alex RN  Case Management  [facility]  [phone]

## 2023-05-19 NOTE — CONSULTS
[facility] - Intensive Care  Cardiology  Consult Note    Patient Name: Saji Castañeda  MRN: [id number]  Admission Date: 5/19/2023  Hospital Length of Stay: 0 days  Code Status: Full Code   Attending Provider: Anna Mcdonnell MD   Consulting Provider: ANIYAH Dc ANP  Primary Care Physician: [facility]  Principal Problem:Chronic osteomyelitis    Patient information was obtained from patient, past medical records and ER records.     Inpatient consult to Cardiology[facility]  Consult performed by: ANIYAH Martínez ANP  Consult ordered by: Anna Mcdonnell MD  Reason for consult: CLI         Subjective:     Chief Complaint:  Bilateral foot wounds      HPI:   74yo male with HTN, DMII- insulin dependent, debility- wheelchair bound, type I AVB, PVD, chronic osteomyelitis, TUI, CELINE and RUE DVT on Eliquis who was transferred from [facility] for evaluation of CLI and for possible revascularization. Mr. Castañeda reports his wounds have been present for years and have not gotten better. He reports bilateral heel wounds along with wound to his right amputation site. He reports wound to the surgical site to his left since 2012 (chart reviewed with surgical intervention [id number]). He presented to the ER at [facility] with concerns for worsening wounds. He was admitted to Hospital Medicine and vascular surgery was consulted with recommendation for AKA however he declined therefore [facility] Interventional Cardiology was consulted for evaluation. He reports being wheelchair bound for the past several months and does not walk independently. WBCs 6K H&H 8.3&27.3 Blood cultures NGTD right foot culture with pseudomonas, proteus mirabilis, klebsiella pneumoniae, proteus penneri. On IV Rocephin. HR and BP stable.      Of note on chart review, history of non compliance with missed wound care appts due to transportation issues. Also referred to Cards clinic by Dr. Farrell with missed appts with Dr. Wu and  Dr. Henry making compliance a concern     Hospital Course: 5/19/2023 per HPI      Past Medical History:   Diagnosis Date    Arthritis     legs    Diabetes mellitus     Diabetes mellitus, type 2     Hyperlipidemia     Hypertension     Osteomyelitis        Past Surgical History:   Procedure Laterality Date    ANGIOGRAPHY OF LOWER EXTREMITY N/A 2/3/2021    Procedure: Angiogram Extremity Bilateral;  Surgeon: Ernst Chacko MD;  Location: [facility] OR [address] [place] [address];  Service: Peripheral Vascular;  Laterality: N/A;  7.4 mintues fluoroscopy time  816.15 mGy  170.17 Gycm2    AORTOGRAPHY WITH EXTREMITY RUNOFF Bilateral 2/3/2021    Procedure: AORTOGRAM, WITH EXTREMITY RUNOFF;  Surgeon: Ernst Chacko MD;  Location: [facility] OR [address] [place] [address];  Service: Peripheral Vascular;  Laterality: Bilateral;    DEBRIDEMENT OF FOOT Left 2/23/2021    Procedure: DEBRIDEMENT, LEFT HEEL;  Surgeon: Mayra Schroeder DPM;  Location: [facility] OR [address] [place] [address];  Service: Podiatry;  Laterality: Left;    FOOT AMPUTATION  October 2010    left high midfoot amputation       Review of patient's allergies indicates:  No Known Allergies    Current Facility-Administered Medications on File Prior to Encounter   Medication    [DISCONTINUED] acetaminophen tablet 650 mg    [DISCONTINUED] apixaban tablet 5 mg    [DISCONTINUED] atorvastatin tablet 80 mg    [DISCONTINUED] cefTRIAXone (ROCEPHIN) 1 g in dextrose 5 % in water (D5W) 5 % 50 mL IVPB (MB+)    [DISCONTINUED] clopidogreL tablet 75 mg    [DISCONTINUED] collagenase ointment    [DISCONTINUED] dextrose 10% bolus 125 mL 125 mL    [DISCONTINUED] dextrose 10% bolus 250 mL 250 mL    [DISCONTINUED] dextrose 40 % gel 15,000 mg    [DISCONTINUED] dextrose 40 % gel 30,000 mg    [DISCONTINUED] glucagon (human recombinant) injection 1 mg    [DISCONTINUED] insulin aspart U-100 pen 1-10 Units    [DISCONTINUED] insulin aspart U-100 pen 10 Units    [DISCONTINUED] insulin aspart U-100 pen 13 Units     "[DISCONTINUED] insulin detemir U-100 (Levemir) pen 25 Units    [DISCONTINUED] insulin detemir U-100 (Levemir) pen 34 Units    [DISCONTINUED] insulin detemir U-100 (Levemir) pen 40 Units    [DISCONTINUED] isosorbide dinitrate tablet 10 mg    [DISCONTINUED] losartan-hydrochlorothiazide 100-25 mg per tablet 1 tablet    [DISCONTINUED] melatonin tablet 6 mg    [DISCONTINUED] mupirocin 2 % ointment    [DISCONTINUED] naloxone 0.4 mg/mL injection 0.02 mg    [DISCONTINUED] NIFEdipine 24 hr tablet 60 mg    [DISCONTINUED] oxyCODONE immediate release tablet 5 mg    [DISCONTINUED] pantoprazole EC tablet 40 mg    [DISCONTINUED] sodium chloride 0.9% flush 10 mL    [DISCONTINUED] tamsulosin 24 hr capsule 0.4 mg     Current Outpatient Medications on File Prior to Encounter   Medication Sig    apixaban (ELIQUIS) 5 mg Tab Take 1 tablet (5 mg total) by mouth 2 (two) times daily.    ascorbic acid, vitamin C, (VITAMIN C) 500 MG tablet Take 500 mg by mouth once daily.    aspirin (ECOTRIN) 81 MG EC tablet Take 81 mg by mouth once daily.    B COMPLEX-VITAMIN B12 tablet Take 1 tablet by mouth once daily.    BD ULTRA-FINE ADITYA PEN NEEDLE 32 gauge x 5/32" Ndle USE FOUR TIMES DAILY WITH MEALS AND NIGHTLY    blood sugar diagnostic (CONTOUR TEST STRIPS) Strp Inject 1 strip into the skin 2 (two) times daily before meals.    clopidogreL (PLAVIX) 75 mg tablet Take 1 tablet (75 mg total) by mouth once daily.    furosemide (LASIX) 40 MG tablet Take 40 mg by mouth once daily.    gabapentin (NEURONTIN) 100 MG capsule Take 2 capsules (200 mg total) by mouth 2 (two) times daily. (Patient taking differently: Take 300 mg by mouth 2 (two) times daily.)    glipiZIDE (GLUCOTROL) 10 MG tablet Take 20 mg by mouth 2 (two) times a day.    hydrALAZINE (APRESOLINE) 25 MG tablet Take 1 tablet (25 mg total) by mouth 3 (three) times daily. (Patient taking differently: Take 25 mg by mouth 2 (two) times a day.)    insulin aspart U-100 (NOVOLOG) " 100 unit/mL (3 mL) InPn pen Inject 20 Units into the skin 3 (three) times daily with meals. (Patient not taking: Reported on 5/17/2023)    isosorbide dinitrate (ISORDIL) 10 MG tablet Take 1 tablet (10 mg total) by mouth 3 (three) times daily.    LANTUS SOLOSTAR U-100 INSULIN glargine 100 units/mL SubQ pen Inject 45 Units into the skin every evening. (Patient taking differently: Inject 50 Units into the skin every evening.)    losartan-hydrochlorothiazide 100-25 mg (HYZAAR) 100-25 mg per tablet Take 1 tablet by mouth once daily.    metFORMIN (GLUCOPHAGE-XR) 500 MG ER 24hr tablet Take 1,000 mg by mouth 2 (two) times a day.    MICROLET LANCET Misc     NIFEdipine (ADALAT CC) 60 MG TbSR Take 60 mg by mouth once daily.    pantoprazole (PROTONIX) 40 MG tablet Take 40 mg by mouth once daily.    rosuvastatin (CRESTOR) 40 MG Tab Take 40 mg by mouth once daily.    tamsulosin (FLOMAX) 0.4 mg Cap Take 0.4 mg by mouth once daily.    [DISCONTINUED] hydroCHLOROthiazide (HYDRODIURIL) 25 MG tablet Take 0.5 tablets (12.5 mg total) by mouth every Mon, Wed, Fri. Beginning on 7/4/2022     Family History       Problem Relation (Age of Onset)    Cancer Brother    Diabetes Mother, Sister    Heart disease Mother    Stroke Sister          Tobacco Use    Smoking status: Never    Smokeless tobacco: Never   Substance and Sexual Activity    Alcohol use: No     Comment: occassional    Drug use: No    Sexual activity: Not Currently     Review of Systems   Constitutional: Negative for chills, decreased appetite, diaphoresis and fever.   Cardiovascular:  Negative for chest pain, claudication, cyanosis, dyspnea on exertion, irregular heartbeat, leg swelling, near-syncope, orthopnea, palpitations, paroxysmal nocturnal dyspnea and syncope.   Respiratory:  Negative for cough, hemoptysis, shortness of breath and wheezing.    Skin:  Positive for poor wound healing.   Gastrointestinal:  Negative for bloating, abdominal pain, constipation,  diarrhea, melena, nausea and vomiting.   Neurological:  Negative for dizziness and weakness.   Objective:     Vital Signs (Most Recent):  Temp: 98.4 °F (36.9 °C) (05/19/23 1115)  Pulse: 65 (05/19/23 1300)  Resp: 19 (05/19/23 1300)  BP: (!) 154/71 (05/19/23 1300)  SpO2: 97 % (05/19/23 1300) Vital Signs (24h Range):  Temp:  [97.9 °F (36.6 °C)-99.3 °F (37.4 °C)] 98.4 °F (36.9 °C)  Pulse:  [54-75] 65  Resp:  [15-35] 19  SpO2:  [94 %-100 %] 97 %  BP: (118-167)/(51-78) 154/71     Weight: 111.5 kg (245 lb 13 oz)  Body mass index is 36.3 kg/m².    SpO2: 97 %         Intake/Output Summary (Last 24 hours) at 5/19/2023 1412  Last data filed at 5/19/2023 1357  Gross per 24 hour   Intake 41.77 ml   Output --   Net 41.77 ml       Lines/Drains/Airways       Drain  Duration             Female External Urinary Catheter 05/16/23 1730 2 days              Peripheral Intravenous Line  Duration                  Peripheral IV - Single Lumen 05/15/23 1135 20 G Right Antecubital 4 days         Peripheral IV - Single Lumen 05/16/23 0000 22 G Anterior;Left Forearm 3 days                     Physical Exam  Constitutional:       General: He is not in acute distress.     Appearance: He is well-developed.   Cardiovascular:      Rate and Rhythm: Normal rate and regular rhythm.      Heart sounds: No murmur heard.    No gallop.   Pulmonary:      Effort: Pulmonary effort is normal. No respiratory distress.      Breath sounds: Normal breath sounds. No wheezing.   Abdominal:      General: Bowel sounds are normal. There is no distension.      Palpations: Abdomen is soft.      Tenderness: There is no abdominal tenderness.   Skin:     General: Skin is warm and dry.      Comments: Bilateral dressings intact    Neurological:      Mental Status: He is alert and oriented to person, place, and time.        Significant Labs: BMP:   Recent Labs   Lab 05/18/23  0328 05/18/23  0819 05/18/23  1251 05/19/23  0429   * 244* 291* 315*    138 137 136   K  "4.5 4.3 4.2 4.2    102 100 100   CO2 26 26 28 28   BUN 24* 22 21 21   CREATININE 1.4 1.2 1.3 1.2   CALCIUM 9.1 9.2 9.3 9.0   MG 1.8  --   --  1.8    and CBC   Recent Labs   Lab 05/18/23  0328 05/19/23  0429   WBC 6.88 6.53   HGB 8.4* 8.3*   HCT 27.6* 27.3*    323       Significant Imaging: Echocardiogram: Transthoracic echo (TTE) complete ([facility] Only):   Results for orders placed or performed during the hospital encounter of 03/09/23   Echo   Result Value Ref Range    BSA 2.45 m2    TDI SEPTAL 0.13 m/s    LV LATERAL E/E' RATIO 9.33 m/s    LV SEPTAL E/E' RATIO 8.62 m/s    IVC diameter 2.4 cm    Left Ventricular Outflow Tract Mean Velocity 0.67 cm/s    Left Ventricular Outflow Tract Mean Gradient 2.08 mmHg    TDI LATERAL 0.12 m/s    LVIDd 5.29 3.5 - 6.0 cm    IVS 1.44 (A) 0.6 - 1.1 cm    Posterior Wall 1.41 (A) 0.6 - 1.1 cm    LVIDs 3.30 2.1 - 4.0 cm    FS 38 28 - 44 %    LV mass 326.18 g    LA size 3.81 cm    Left Ventricle Relative Wall Thickness 0.53 cm    AV mean gradient 4 mmHg    AV valve area 2.44 cm2    AV Velocity Ratio 0.71     AV index (prosthetic) 0.65     MV mean gradient 2 mmHg    MV valve area p 1/2 method 3.53 cm2    MV valve area by continuity eq 2.45 cm2    E/A ratio 1.23     Mean e' 0.13 m/s    E wave deceleration time 212.48 msec    IVRT 138.92 msec    MV "A" wave duration 167.178258053013512 msec    Pulm vein S/D ratio 1.76     LVOT diameter 2.18 cm    LVOT area 3.7 cm2    LVOT peak dl 0.97 m/s    LVOT peak VTI 22.00 cm    Ao peak dl 1.36 m/s    Ao VTI 33.6 cm    Mr max dl 2.36 m/s    LVOT stroke volume 82.07 cm3    AV peak gradient 7 mmHg    MV peak gradient 5 mmHg    E/E' ratio 8.96 m/s    MV Peak E Dl 1.12 m/s    TR Max Dl 2.64 m/s    MV VTI 33.5 cm    MV stenosis pressure 1/2 time 62.28 ms    MV Peak A Dl 0.91 m/s    PV Peak S Dl 0.37 m/s    PV Peak D Dl 0.21 m/s    LV Systolic Volume 44.08 mL    LV Systolic Volume Index 18.8 mL/m2    LV Diastolic Volume 134.78 mL    LV " Diastolic Volume Index 57.35 mL/m2    LV Mass Index 139 g/m2    RA Major Axis 5.63 cm    Left Atrium Minor Axis 6.53 cm    Left Atrium Major Axis 7.50 cm    Triscuspid Valve Regurgitation Peak Gradient 28 mmHg    LA Volume Index (Mod) 48.1 mL/m2    LA volume (mod) 112.99 cm3    Right Atrial Pressure (from IVC) 8 mmHg    EF 70 %    Sinus 3.50 cm    TV rest pulmonary artery pressure 36 mmHg    Narrative    · The left ventricle is mildly enlarged with concentric hypertrophy and   normal systolic function.  · The estimated ejection fraction is 70%.  · Normal left ventricular diastolic function.  · Normal right ventricular size with normal right ventricular systolic   function.  · Severe left atrial enlargement.  · Intermediate central venous pressure (8 mmHg).  · The estimated PA systolic pressure is 36 mmHg.  · Small pericardial effusion.  · Valves not well visualized.        Assessment and Plan:     * Chronic osteomyelitis  - bilateral foot wounds for several months/years  - Blood culture pending; wound culture with multiple organisms  - on IV Rocephin  - further recs per ID, Podiatry and primary team     Chronic deep vein thrombosis (DVT) of right upper extremity  - UE venous ultrasound 2022 with thrombosis of the right subclavian, axillary, basilic, and cephalic veins with notation of PICC at that time  - treated with Eliquis and remains on Eliquis  - no significant swelling noted     PVD (peripheral vascular disease)  - long standing wounds with skin graft to left TMA in [id number]; LE angio in 2014 with patent left CFA, SFA, PFA with 3V run off on LE angiogram by Vascular surgery  - CTA LE with run off 6/2022 with moderate to high grade disease of right popliteal with severely diseased AT, PT, peroneal and multiple occlusion of left popliteal with occluded PT and peroneal and short segment occlusion of AT- patient denies any previous intervention  - BLE arterial ultrasound 3/2023 with similar findings suggestive of  bilateral popliteal and BTK disease; seen by Vascular surgery at [facility] with recommendation for amputation- patient declined  - transferred to [facility] for evaluation for revascularization; extensive disease and multiple co morbidities along with debility/bed bound status and concern for non compliance- uncertain if good candidate for revascularization; will discuss and review case with IC staff and defer final decision to staff  - for now continue Plavix and statin therapy; no ASA likely due to Eliquis use; if decision to proceed with revascularization will need to hold Eliquis for 48 hours     Essential hypertension  - SBP 110s-170s  - on Isordil 10 TID, Nifedipine 60mg Hydralazine 25mg TID, Losartan HCTZ 100/25mg as an outpatient  - goal BP less than 130/80; on Isordil and Nifedipine only; BP not fully controlled anticipate resumption of Losartan and Hydralazine for optimal control  - continue to monitor BP         VTE Risk Mitigation (From admission, onward)         Ordered     apixaban tablet 5 mg  2 times daily         05/19/23 1140                Thank you for your consult. I will follow-up with patient. Please contact us if you have any additional questions.    ANIYAH Dc, ANP  Cardiology   [place] - Intensive Care

## 2023-05-19 NOTE — NURSING
Nursing Transfer Note      5/19/2023     Reason patient is being transferred: Interventional Cardiology    Transfer From: [facility] [place]    Transfer via stretcher    Transfer with wheelchair    Transported by EMS    Medicines sent: Novolog and Asterir pens,Santyl    Any special needs or follow-up needed: notified Dr. Mcdonnell of arrival    Chart send with patient: Yes    Notified: patient notified his brother, Noe    Patient reassessed at: 0950on 5/19/23    Upon arrival to floor: cardiac monitor applied, patient oriented to room, call bell in reach, and bed in lowest position

## 2023-05-19 NOTE — ASSESSMENT & PLAN NOTE
- long standing wounds with skin graft to left TMA in [id number]; LE angio in 2014 with patent left CFA, SFA, PFA with 3V run off on LE angiogram by Vascular surgery  - CTA LE with run off 6/2022 with moderate to high grade disease of right popliteal with severely diseased AT, PT, peroneal and multiple occlusion of left popliteal with occluded PT and peroneal and short segment occlusion of AT- patient denies any previous intervention  - BLE arterial ultrasound 3/2023 with similar findings suggestive of bilateral popliteal and BTK disease; seen by Vascular surgery at [facility] with recommendation for amputation- patient declined  - transferred to [facility] for evaluation for revascularization; extensive disease and multiple co morbidities along with debility/bed bound status and concern for non compliance- uncertain if good candidate for revascularization; will discuss and review case with IC staff and defer final decision to staff  - for now continue Plavix and statin therapy; no ASA likely due to Eliquis use; if decision to proceed with revascularization will need to hold Eliquis for 48 hours

## 2023-05-19 NOTE — NURSING
Discharge paperwork and education provided. Patient left via [facility] with all belongings in stable condition.

## 2023-05-19 NOTE — ASSESSMENT & PLAN NOTE
- bilateral foot wounds for several months/years  - Blood culture pending; wound culture with multiple organisms  - on IV Rocephin  - further recs per ID, Podiatry and primary team

## 2023-05-19 NOTE — ASSESSMENT & PLAN NOTE
Patient's FSGs are uncontrolled due to hyperglycemia on current medication regimen.  Last A1c reviewed-   Lab Results   Component Value Date    HGBA1C 8.4 (H) 03/10/2023     Most recent fingerstick glucose reviewed-   Recent Labs   Lab 05/18/23  1956 05/19/23  0732 05/19/23  1114 05/19/23  1208   POCTGLUCOSE 280* 277* 236* 226*     Current correctional scale  Medium  Increase anti-hyperglycemic dose as follows-   Antihyperglycemics (From admission, onward)    Start     Stop Route Frequency Ordered    05/19/23 1145  insulin detemir U-100 (Levemir) pen 25 Units         -- SubQ 2 times daily 05/19/23 1140    05/19/23 1145  insulin aspart U-100 pen 15 Units         -- SubQ 3 times daily with meals 05/19/23 1140    05/19/23 1138  insulin aspart U-100 pen 1-10 Units         -- SubQ Before meals & nightly PRN 05/19/23 1140        Hold Oral hypoglycemics while patient is in the hospital.

## 2023-05-19 NOTE — ASSESSMENT & PLAN NOTE
H/o chronic osteomyelitis s/p L TMA with severe underlying PAD  Vascular surgery at [facility] recommends bilateral AKA but patient adamantly declines     Cardiology consult to evaluate revascularization candidacy.   Currently on eliquis, if determined a candidate for revascularization- will need to switch to heparin or lovenox     Not on antibiotics per ID recommendations at [facility] given poor blood flow. Clinically stable at this time.

## 2023-05-19 NOTE — NURSING
"At 1930 Received call from transfer center and pt. Has bed at [place]. When nurse informed pt. Of plan he said "fuck no, shit no, I'll go in the morning." Transfer center, charge nurse, and on call physician notified.   "

## 2023-05-19 NOTE — SUBJECTIVE & OBJECTIVE
Past Medical History:   Diagnosis Date    Arthritis     legs    Diabetes mellitus     Diabetes mellitus, type 2     Hyperlipidemia     Hypertension     Osteomyelitis        Past Surgical History:   Procedure Laterality Date    ANGIOGRAPHY OF LOWER EXTREMITY N/A 2/3/2021    Procedure: Angiogram Extremity Bilateral;  Surgeon: Ernst Chacko MD;  Location: [facility] OR [address] [place] [address];  Service: Peripheral Vascular;  Laterality: N/A;  7.4 mintues fluoroscopy time  816.15 mGy  170.17 Gycm2    AORTOGRAPHY WITH EXTREMITY RUNOFF Bilateral 2/3/2021    Procedure: AORTOGRAM, WITH EXTREMITY RUNOFF;  Surgeon: Ernst Chacko MD;  Location: [facility] OR [address] [place] [address];  Service: Peripheral Vascular;  Laterality: Bilateral;    DEBRIDEMENT OF FOOT Left 2/23/2021    Procedure: DEBRIDEMENT, LEFT HEEL;  Surgeon: Mayra Schroeder DPM;  Location: [facility] OR [address] [place] [address];  Service: Podiatry;  Laterality: Left;    FOOT AMPUTATION  October 2010    left high midfoot amputation       Review of patient's allergies indicates:  No Known Allergies    Current Facility-Administered Medications on File Prior to Encounter   Medication    [DISCONTINUED] acetaminophen tablet 650 mg    [DISCONTINUED] apixaban tablet 5 mg    [DISCONTINUED] atorvastatin tablet 80 mg    [DISCONTINUED] cefTRIAXone (ROCEPHIN) 1 g in dextrose 5 % in water (D5W) 5 % 50 mL IVPB (MB+)    [DISCONTINUED] clopidogreL tablet 75 mg    [DISCONTINUED] collagenase ointment    [DISCONTINUED] dextrose 10% bolus 125 mL 125 mL    [DISCONTINUED] dextrose 10% bolus 250 mL 250 mL    [DISCONTINUED] dextrose 40 % gel 15,000 mg    [DISCONTINUED] dextrose 40 % gel 30,000 mg    [DISCONTINUED] glucagon (human recombinant) injection 1 mg    [DISCONTINUED] insulin aspart U-100 pen 1-10 Units    [DISCONTINUED] insulin aspart U-100 pen 10 Units    [DISCONTINUED] insulin aspart U-100 pen 13 Units    [DISCONTINUED] insulin detemir U-100 (Levemir) pen 25 Units    [DISCONTINUED] insulin detemir U-100 (Levemir) pen 34  "Units    [DISCONTINUED] insulin detemir U-100 (Levemir) pen 40 Units    [DISCONTINUED] isosorbide dinitrate tablet 10 mg    [DISCONTINUED] losartan-hydrochlorothiazide 100-25 mg per tablet 1 tablet    [DISCONTINUED] melatonin tablet 6 mg    [DISCONTINUED] mupirocin 2 % ointment    [DISCONTINUED] naloxone 0.4 mg/mL injection 0.02 mg    [DISCONTINUED] NIFEdipine 24 hr tablet 60 mg    [DISCONTINUED] oxyCODONE immediate release tablet 5 mg    [DISCONTINUED] pantoprazole EC tablet 40 mg    [DISCONTINUED] sodium chloride 0.9% flush 10 mL    [DISCONTINUED] tamsulosin 24 hr capsule 0.4 mg     Current Outpatient Medications on File Prior to Encounter   Medication Sig    apixaban (ELIQUIS) 5 mg Tab Take 1 tablet (5 mg total) by mouth 2 (two) times daily.    ascorbic acid, vitamin C, (VITAMIN C) 500 MG tablet Take 500 mg by mouth once daily.    aspirin (ECOTRIN) 81 MG EC tablet Take 81 mg by mouth once daily.    B COMPLEX-VITAMIN B12 tablet Take 1 tablet by mouth once daily.    BD ULTRA-FINE ADITYA PEN NEEDLE 32 gauge x 5/32" Ndle USE FOUR TIMES DAILY WITH MEALS AND NIGHTLY    blood sugar diagnostic (CONTOUR TEST STRIPS) Strp Inject 1 strip into the skin 2 (two) times daily before meals.    clopidogreL (PLAVIX) 75 mg tablet Take 1 tablet (75 mg total) by mouth once daily.    furosemide (LASIX) 40 MG tablet Take 40 mg by mouth once daily.    gabapentin (NEURONTIN) 100 MG capsule Take 2 capsules (200 mg total) by mouth 2 (two) times daily. (Patient taking differently: Take 300 mg by mouth 2 (two) times daily.)    glipiZIDE (GLUCOTROL) 10 MG tablet Take 20 mg by mouth 2 (two) times a day.    hydrALAZINE (APRESOLINE) 25 MG tablet Take 1 tablet (25 mg total) by mouth 3 (three) times daily. (Patient taking differently: Take 25 mg by mouth 2 (two) times a day.)    insulin aspart U-100 (NOVOLOG) 100 unit/mL (3 mL) InPn pen Inject 20 Units into the skin 3 (three) times daily with meals. (Patient not taking: Reported on 5/17/2023)    " isosorbide dinitrate (ISORDIL) 10 MG tablet Take 1 tablet (10 mg total) by mouth 3 (three) times daily.    LANTUS SOLOSTAR U-100 INSULIN glargine 100 units/mL SubQ pen Inject 45 Units into the skin every evening. (Patient taking differently: Inject 50 Units into the skin every evening.)    losartan-hydrochlorothiazide 100-25 mg (HYZAAR) 100-25 mg per tablet Take 1 tablet by mouth once daily.    metFORMIN (GLUCOPHAGE-XR) 500 MG ER 24hr tablet Take 1,000 mg by mouth 2 (two) times a day.    MICROLET LANCET Misc     NIFEdipine (ADALAT CC) 60 MG TbSR Take 60 mg by mouth once daily.    pantoprazole (PROTONIX) 40 MG tablet Take 40 mg by mouth once daily.    rosuvastatin (CRESTOR) 40 MG Tab Take 40 mg by mouth once daily.    tamsulosin (FLOMAX) 0.4 mg Cap Take 0.4 mg by mouth once daily.    [DISCONTINUED] hydroCHLOROthiazide (HYDRODIURIL) 25 MG tablet Take 0.5 tablets (12.5 mg total) by mouth every Mon, Wed, Fri. Beginning on 7/4/2022     Family History       Problem Relation (Age of Onset)    Cancer Brother    Diabetes Mother, Sister    Heart disease Mother    Stroke Sister          Tobacco Use    Smoking status: Never    Smokeless tobacco: Never   Substance and Sexual Activity    Alcohol use: No     Comment: occassional    Drug use: No    Sexual activity: Not Currently     Review of Systems   Constitutional:  Negative for appetite change and fever.   Respiratory:  Negative for cough and shortness of breath.    Cardiovascular:  Negative for chest pain and leg swelling.   Gastrointestinal:  Negative for abdominal pain, constipation and diarrhea.   Skin:  Positive for wound.   Neurological:  Negative for light-headedness and headaches.   Psychiatric/Behavioral:  Negative for confusion.    Objective:     Vital Signs (Most Recent):  Temp: 98.4 °F (36.9 °C) (05/19/23 1115)  Pulse: 65 (05/19/23 1300)  Resp: 19 (05/19/23 1300)  BP: (!) 154/71 (05/19/23 1300)  SpO2: 97 % (05/19/23 1300) Vital Signs (24h Range):  Temp:  [97.9 °F  (36.6 °C)-99.3 °F (37.4 °C)] 98.4 °F (36.9 °C)  Pulse:  [54-75] 65  Resp:  [15-35] 19  SpO2:  [94 %-100 %] 97 %  BP: (118-167)/(51-78) 154/71     Weight: 111.5 kg (245 lb 13 oz)  Body mass index is 36.3 kg/m².     Physical Exam  Vitals and nursing note reviewed.   Constitutional:       General: He is not in acute distress.     Appearance: He is well-developed. He is obese. He is not diaphoretic.   HENT:      Head: Normocephalic and atraumatic.   Eyes:      General: No scleral icterus.  Neck:      Trachea: No tracheal deviation.   Cardiovascular:      Rate and Rhythm: Normal rate and regular rhythm.      Heart sounds: Normal heart sounds. No murmur heard.  Pulmonary:      Effort: Pulmonary effort is normal. No respiratory distress.      Breath sounds: Normal breath sounds. No wheezing.   Abdominal:      General: Bowel sounds are normal. There is no distension.      Palpations: Abdomen is soft.      Tenderness: There is no abdominal tenderness.   Skin:     General: Skin is warm and dry.      Comments: Bilateral LE with chronic skin changes, wounds/ bandaged. S/p L metatarsal amputation   Neurological:      Mental Status: He is alert and oriented to person, place, and time.   Psychiatric:         Behavior: Behavior normal.              Significant Labs: All pertinent labs within the past 24 hours have been reviewed.    Significant Imaging: I have reviewed all pertinent imaging results/findings within the past 24 hours.

## 2023-05-19 NOTE — ASSESSMENT & PLAN NOTE
- SBP 110s-170s  - on Isordil 10 TID, Nifedipine 60mg Hydralazine 25mg TID, Losartan HCTZ 100/25mg as an outpatient  - goal BP less than 130/80; on Isordil and Nifedipine only; BP not fully controlled anticipate resumption of Losartan and Hydralazine for optimal control  - continue to monitor BP

## 2023-05-19 NOTE — ASSESSMENT & PLAN NOTE
Advance Care Planning     Code Status  In light of the patient's medical co-morbidities, I engaged the patient in a conversation about the patient's preferences for care at the very end of life. The patient wishes to have aggressive measures including CPR, intubation. I communicated to the patient that a full code order will be placed in the chart.  I spent a total of 5 minutes engaging the patient in this advance care planning discussion.    Does not have significant other or children. Lives with siblings and would like his siblings specially the sister younger to him Meliza to make medical decisions for him if he is unable to.

## 2023-05-19 NOTE — SUBJECTIVE & OBJECTIVE
Past Medical History:   Diagnosis Date    Arthritis     legs    Diabetes mellitus     Diabetes mellitus, type 2     Hyperlipidemia     Hypertension     Osteomyelitis        Past Surgical History:   Procedure Laterality Date    ANGIOGRAPHY OF LOWER EXTREMITY N/A 2/3/2021    Procedure: Angiogram Extremity Bilateral;  Surgeon: Ernst Chacko MD;  Location: [facility] OR [address] [place] [address];  Service: Peripheral Vascular;  Laterality: N/A;  7.4 mintues fluoroscopy time  816.15 mGy  170.17 Gycm2    AORTOGRAPHY WITH EXTREMITY RUNOFF Bilateral 2/3/2021    Procedure: AORTOGRAM, WITH EXTREMITY RUNOFF;  Surgeon: Ernst Chacko MD;  Location: [facility] OR [address] [place] [address];  Service: Peripheral Vascular;  Laterality: Bilateral;    DEBRIDEMENT OF FOOT Left 2/23/2021    Procedure: DEBRIDEMENT, LEFT HEEL;  Surgeon: Mayra Schroeder DPM;  Location: [facility] OR [address] [place] [address];  Service: Podiatry;  Laterality: Left;    FOOT AMPUTATION  October 2010    left high midfoot amputation       Review of patient's allergies indicates:  No Known Allergies    Current Facility-Administered Medications on File Prior to Encounter   Medication    [DISCONTINUED] acetaminophen tablet 650 mg    [DISCONTINUED] apixaban tablet 5 mg    [DISCONTINUED] atorvastatin tablet 80 mg    [DISCONTINUED] cefTRIAXone (ROCEPHIN) 1 g in dextrose 5 % in water (D5W) 5 % 50 mL IVPB (MB+)    [DISCONTINUED] clopidogreL tablet 75 mg    [DISCONTINUED] collagenase ointment    [DISCONTINUED] dextrose 10% bolus 125 mL 125 mL    [DISCONTINUED] dextrose 10% bolus 250 mL 250 mL    [DISCONTINUED] dextrose 40 % gel 15,000 mg    [DISCONTINUED] dextrose 40 % gel 30,000 mg    [DISCONTINUED] glucagon (human recombinant) injection 1 mg    [DISCONTINUED] insulin aspart U-100 pen 1-10 Units    [DISCONTINUED] insulin aspart U-100 pen 10 Units    [DISCONTINUED] insulin aspart U-100 pen 13 Units    [DISCONTINUED] insulin detemir U-100 (Levemir) pen 25 Units    [DISCONTINUED] insulin detemir U-100 (Levemir) pen 34  "Units    [DISCONTINUED] insulin detemir U-100 (Levemir) pen 40 Units    [DISCONTINUED] isosorbide dinitrate tablet 10 mg    [DISCONTINUED] losartan-hydrochlorothiazide 100-25 mg per tablet 1 tablet    [DISCONTINUED] melatonin tablet 6 mg    [DISCONTINUED] mupirocin 2 % ointment    [DISCONTINUED] naloxone 0.4 mg/mL injection 0.02 mg    [DISCONTINUED] NIFEdipine 24 hr tablet 60 mg    [DISCONTINUED] oxyCODONE immediate release tablet 5 mg    [DISCONTINUED] pantoprazole EC tablet 40 mg    [DISCONTINUED] sodium chloride 0.9% flush 10 mL    [DISCONTINUED] tamsulosin 24 hr capsule 0.4 mg     Current Outpatient Medications on File Prior to Encounter   Medication Sig    apixaban (ELIQUIS) 5 mg Tab Take 1 tablet (5 mg total) by mouth 2 (two) times daily.    ascorbic acid, vitamin C, (VITAMIN C) 500 MG tablet Take 500 mg by mouth once daily.    aspirin (ECOTRIN) 81 MG EC tablet Take 81 mg by mouth once daily.    B COMPLEX-VITAMIN B12 tablet Take 1 tablet by mouth once daily.    BD ULTRA-FINE ADITYA PEN NEEDLE 32 gauge x 5/32" Ndle USE FOUR TIMES DAILY WITH MEALS AND NIGHTLY    blood sugar diagnostic (CONTOUR TEST STRIPS) Strp Inject 1 strip into the skin 2 (two) times daily before meals.    clopidogreL (PLAVIX) 75 mg tablet Take 1 tablet (75 mg total) by mouth once daily.    furosemide (LASIX) 40 MG tablet Take 40 mg by mouth once daily.    gabapentin (NEURONTIN) 100 MG capsule Take 2 capsules (200 mg total) by mouth 2 (two) times daily. (Patient taking differently: Take 300 mg by mouth 2 (two) times daily.)    glipiZIDE (GLUCOTROL) 10 MG tablet Take 20 mg by mouth 2 (two) times a day.    hydrALAZINE (APRESOLINE) 25 MG tablet Take 1 tablet (25 mg total) by mouth 3 (three) times daily. (Patient taking differently: Take 25 mg by mouth 2 (two) times a day.)    insulin aspart U-100 (NOVOLOG) 100 unit/mL (3 mL) InPn pen Inject 20 Units into the skin 3 (three) times daily with meals. (Patient not taking: Reported on 5/17/2023)    " isosorbide dinitrate (ISORDIL) 10 MG tablet Take 1 tablet (10 mg total) by mouth 3 (three) times daily.    LANTUS SOLOSTAR U-100 INSULIN glargine 100 units/mL SubQ pen Inject 45 Units into the skin every evening. (Patient taking differently: Inject 50 Units into the skin every evening.)    losartan-hydrochlorothiazide 100-25 mg (HYZAAR) 100-25 mg per tablet Take 1 tablet by mouth once daily.    metFORMIN (GLUCOPHAGE-XR) 500 MG ER 24hr tablet Take 1,000 mg by mouth 2 (two) times a day.    MICROLET LANCET Misc     NIFEdipine (ADALAT CC) 60 MG TbSR Take 60 mg by mouth once daily.    pantoprazole (PROTONIX) 40 MG tablet Take 40 mg by mouth once daily.    rosuvastatin (CRESTOR) 40 MG Tab Take 40 mg by mouth once daily.    tamsulosin (FLOMAX) 0.4 mg Cap Take 0.4 mg by mouth once daily.    [DISCONTINUED] hydroCHLOROthiazide (HYDRODIURIL) 25 MG tablet Take 0.5 tablets (12.5 mg total) by mouth every Mon, Wed, Fri. Beginning on 7/4/2022     Family History       Problem Relation (Age of Onset)    Cancer Brother    Diabetes Mother, Sister    Heart disease Mother    Stroke Sister          Tobacco Use    Smoking status: Never    Smokeless tobacco: Never   Substance and Sexual Activity    Alcohol use: No     Comment: occassional    Drug use: No    Sexual activity: Not Currently     Review of Systems   Constitutional: Negative for chills, decreased appetite, diaphoresis and fever.   Cardiovascular:  Negative for chest pain, claudication, cyanosis, dyspnea on exertion, irregular heartbeat, leg swelling, near-syncope, orthopnea, palpitations, paroxysmal nocturnal dyspnea and syncope.   Respiratory:  Negative for cough, hemoptysis, shortness of breath and wheezing.    Skin:  Positive for poor wound healing.   Gastrointestinal:  Negative for bloating, abdominal pain, constipation, diarrhea, melena, nausea and vomiting.   Neurological:  Negative for dizziness and weakness.   Objective:     Vital Signs (Most Recent):  Temp: 98.4 °F (36.9  °C) (05/19/23 1115)  Pulse: 65 (05/19/23 1300)  Resp: 19 (05/19/23 1300)  BP: (!) 154/71 (05/19/23 1300)  SpO2: 97 % (05/19/23 1300) Vital Signs (24h Range):  Temp:  [97.9 °F (36.6 °C)-99.3 °F (37.4 °C)] 98.4 °F (36.9 °C)  Pulse:  [54-75] 65  Resp:  [15-35] 19  SpO2:  [94 %-100 %] 97 %  BP: (118-167)/(51-78) 154/71     Weight: 111.5 kg (245 lb 13 oz)  Body mass index is 36.3 kg/m².    SpO2: 97 %         Intake/Output Summary (Last 24 hours) at 5/19/2023 1412  Last data filed at 5/19/2023 1357  Gross per 24 hour   Intake 41.77 ml   Output --   Net 41.77 ml       Lines/Drains/Airways       Drain  Duration             Female External Urinary Catheter 05/16/23 1730 2 days              Peripheral Intravenous Line  Duration                  Peripheral IV - Single Lumen 05/15/23 1135 20 G Right Antecubital 4 days         Peripheral IV - Single Lumen 05/16/23 0000 22 G Anterior;Left Forearm 3 days                     Physical Exam  Constitutional:       General: He is not in acute distress.     Appearance: He is well-developed.   Cardiovascular:      Rate and Rhythm: Normal rate and regular rhythm.      Heart sounds: No murmur heard.    No gallop.   Pulmonary:      Effort: Pulmonary effort is normal. No respiratory distress.      Breath sounds: Normal breath sounds. No wheezing.   Abdominal:      General: Bowel sounds are normal. There is no distension.      Palpations: Abdomen is soft.      Tenderness: There is no abdominal tenderness.   Skin:     General: Skin is warm and dry.      Comments: Bilateral dressings intact    Neurological:      Mental Status: He is alert and oriented to person, place, and time.        Significant Labs: BMP:   Recent Labs   Lab 05/18/23  0328 05/18/23  0819 05/18/23  1251 05/19/23  0429   * 244* 291* 315*    138 137 136   K 4.5 4.3 4.2 4.2    102 100 100   CO2 26 26 28 28   BUN 24* 22 21 21   CREATININE 1.4 1.2 1.3 1.2   CALCIUM 9.1 9.2 9.3 9.0   MG 1.8  --   --  1.8    and  "CBC   Recent Labs   Lab 05/18/23  0328 05/19/23  0429   WBC 6.88 6.53   HGB 8.4* 8.3*   HCT 27.6* 27.3*    323       Significant Imaging: Echocardiogram: Transthoracic echo (TTE) complete ([facility] Only):   Results for orders placed or performed during the hospital encounter of 03/09/23   Echo   Result Value Ref Range    BSA 2.45 m2    TDI SEPTAL 0.13 m/s    LV LATERAL E/E' RATIO 9.33 m/s    LV SEPTAL E/E' RATIO 8.62 m/s    IVC diameter 2.4 cm    Left Ventricular Outflow Tract Mean Velocity 0.67 cm/s    Left Ventricular Outflow Tract Mean Gradient 2.08 mmHg    TDI LATERAL 0.12 m/s    LVIDd 5.29 3.5 - 6.0 cm    IVS 1.44 (A) 0.6 - 1.1 cm    Posterior Wall 1.41 (A) 0.6 - 1.1 cm    LVIDs 3.30 2.1 - 4.0 cm    FS 38 28 - 44 %    LV mass 326.18 g    LA size 3.81 cm    Left Ventricle Relative Wall Thickness 0.53 cm    AV mean gradient 4 mmHg    AV valve area 2.44 cm2    AV Velocity Ratio 0.71     AV index (prosthetic) 0.65     MV mean gradient 2 mmHg    MV valve area p 1/2 method 3.53 cm2    MV valve area by continuity eq 2.45 cm2    E/A ratio 1.23     Mean e' 0.13 m/s    E wave deceleration time 212.48 msec    IVRT 138.92 msec    MV "A" wave duration [id number] msec    Pulm vein S/D ratio 1.76     LVOT diameter 2.18 cm    LVOT area 3.7 cm2    LVOT peak dl 0.97 m/s    LVOT peak VTI 22.00 cm    Ao peak dl 1.36 m/s    Ao VTI 33.6 cm    Mr max dl 2.36 m/s    LVOT stroke volume 82.07 cm3    AV peak gradient 7 mmHg    MV peak gradient 5 mmHg    E/E' ratio 8.96 m/s    MV Peak E Dl 1.12 m/s    TR Max Dl 2.64 m/s    MV VTI 33.5 cm    MV stenosis pressure 1/2 time 62.28 ms    MV Peak A Dl 0.91 m/s    PV Peak S Dl 0.37 m/s    PV Peak D Dl 0.21 m/s    LV Systolic Volume 44.08 mL    LV Systolic Volume Index 18.8 mL/m2    LV Diastolic Volume 134.78 mL    LV Diastolic Volume Index 57.35 mL/m2    LV Mass Index 139 g/m2    RA Major Axis 5.63 cm    Left Atrium Minor Axis 6.53 cm    Left Atrium Major Axis 7.50 cm    " Triscuspid Valve Regurgitation Peak Gradient 28 mmHg    LA Volume Index (Mod) 48.1 mL/m2    LA volume (mod) 112.99 cm3    Right Atrial Pressure (from IVC) 8 mmHg    EF 70 %    Sinus 3.50 cm    TV rest pulmonary artery pressure 36 mmHg    Narrative    · The left ventricle is mildly enlarged with concentric hypertrophy and   normal systolic function.  · The estimated ejection fraction is 70%.  · Normal left ventricular diastolic function.  · Normal right ventricular size with normal right ventricular systolic   function.  · Severe left atrial enlargement.  · Intermediate central venous pressure (8 mmHg).  · The estimated PA systolic pressure is 36 mmHg.  · Small pericardial effusion.  · Valves not well visualized.

## 2023-05-19 NOTE — DISCHARGE SUMMARY
"[facility] - [facility] ([address] Medicine  Discharge Summary      Patient Name: Saji Castañeda  MRN: [id number]  BREEZY: [id number]  Patient Class: IP- Inpatient  Admission Date: 5/15/2023  Hospital Length of Stay: 4 days  Discharge Date and Time:  05/19/2023 9:59 AM  Attending Physician: Sejal att. 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 He has had these wound for years, and follows with [facility]. He recently presented 3/2023 for BLE cellulitis, found to have subacute OM of the left foot, and acute OM of the right ankle. He was treated with IV Vanc and Ceftriaxone as well as po flagyl for six weeks, and this was recently completed. During that admission, surgery was consulted for bilateral BKA, which has been discussed several times. He declined.   Wound care comes to his house where he lives with three roommates twice a week, every Monday and Friday. He feels that the wounds are not being adequately wrapped. Today he comes in because the wound wrappings have come loose and he asked his wound care nurses to bring him to the hospital. He does not notice change in appearance to the wounds. There is constant drainage, however has not increased recently. He otherwise does not care for his wounds. He has a shuttle service take him to wound care appointments, however he has had difficulty with transportation recently, thus causing him to miss appointments. He states he can stand on his legs "a little"; he can walk to his front door with a walker. One episode of diarrhea last week, otherwise has been " well and without fevers. He has never smoked, does not drink alcohol, does not utilize illicit drugs.     On arrival to [facility], he has a temp of 100, HR 95, /80, on RA. WBC 8.45, Hgb 9.2 (at baseline), plts 406. CMP pending. . CRP pending. Lactate 1.8. Foot XR wtih soft tissue defect along the dorsal aspect of the right midfoot, and irregularity and patchy lucency in the calcaneus, talus, and distal tibia of the left foot. MRI bilateral lower extremities pending. ID, podiatry, and vascular surgery consulted. He has been admitted to hospital medicine for management of bilateral LE OM.       * No surgery found *      Hospital Course:   Patient admitted to hospital medicine for management of progression of chronic lower extremity osteomyelitis and non healing wounds. Vascular surgery consulted, who recommended AKA. Patient refused. MARIO's unreliable due to non compressible vessels. ID consulted, who noted that antibiotic therapy would likely be ineffective given his degree of vasculopathy and inability to permeate the wounds. Wound care and podiatry also consulted for assistance. Bilateral LE MRI with left chronic OM and right acute OM. Wound cultures with GNRs, presumptive Proteus and Pseudomonas species. Blood cultures NGTD. UA notable for UTI. Has history of ESBL E coli UTI, initiated on empiric meropenem. Culture with pan sensitive Klebsiella, deescalated to ceftriaxone. Completed total 4 days of recommended 7 day course at time of discharge. Insulin gtt initiated on admission for hyperglycemia, transitioned to basal bolus with daily uptitration. Patient still persistently hyperglycemic. Will need continued insulin adjustments in order to obtain proper regimen. He was transferred to [place] for further evaluation with Dr. Patel/interventional cardiology and possible revascularization.     Final podiatry recommendations:  Wound care: 2 times weekly: Cleanse b/l foot and leg wounds with vashe, apply  hydrafera blue ready to all wound bases, follow with 4x4s, kerlix, cast padding, and ace.   WB: Non ambulatory   [place] [facility] [place] within 2 weeks of discharge. Referral placed.       Goals of Care Treatment Preferences:  Code Status: Full Code      Consults:   Consults (From admission, onward)          Status Ordering Provider     Inpatient consult to Vascular Surgery  Once        Provider:  (Not yet assigned)    Completed PRISCILLA STEVE     Inpatient consult to Infectious Diseases  Once        Provider:  (Not yet assigned)    Completed PRISCILLA STEVE     Inpatient consult to Podiatry  Once        Provider:  (Not yet assigned)    Completed FELIZ SWEENEY            No new Assessment & Plan notes have been filed under this hospital service since the last note was generated.  Service: Hospital Medicine    Final Active Diagnoses:    Diagnosis Date Noted POA    PRINCIPAL PROBLEM:  Chronic osteomyelitis [M86.60] 05/15/2023 Yes    Osteomyelitis of left lower extremity [M86.9] 02/18/2021 Yes    Osteomyelitis of right lower extremity [M86.9] 03/10/2023 Yes    Nonhealing ulcer of right lower leg with fat layer exposed [L97.912] 10/09/2022 Yes    Hyperglycemia due to diabetes mellitus [E11.65] 05/15/2023 Yes    UTI (urinary tract infection) [N39.0] 05/16/2023 Yes    Acute deep vein thrombosis (DVT) of right upper extremity [I82.621] 07/06/2022 Yes    PVD (peripheral vascular disease) [I73.9] 02/03/2021 Yes    Peripheral arterial disease [I73.9] 12/10/2020 Yes     Chronic    Asymptomatic Mobitz (type) I (Wenckebach's) atrioventricular block [I44.1] 12/06/2019 Yes    YNES (acute kidney injury) [N17.9] 10/15/2018 Yes    Insulin dependent type 2 diabetes mellitus [E11.9, Z79.4] 09/23/2014 Not Applicable    Essential hypertension [I10] 08/05/2014 Yes     Chronic    Diabetic neuropathy [E11.40] 08/05/2014 Yes     Chronic      Problems Resolved During this Admission:       Discharged Condition:   Transferred (Goldy)    Disposition: Discharged to Other Faci*    Follow Up:    Patient Instructions:      Ambulatory referral/consult to [facility]   Standing Status: Future   Referral Priority: Routine Referral Type: Consultation   Referral Reason: Specialty Services Required   Number of Visits Requested: 1     Ambulatory referral/consult to Podiatry   Standing Status: Future   Referral Priority: Routine Referral Type: Consultation   Referral Reason: Specialty Services Required   Referred to Provider: HINA CAMEJO Requested Specialty: Podiatry   Number of Visits Requested: 1       Significant Diagnostic Studies: Labs:   BMP:   Recent Labs   Lab 05/18/23  0328 05/18/23  0819 05/18/23  1251 05/19/23  0429   * 244* 291* 315*    138 137 136   K 4.5 4.3 4.2 4.2    102 100 100   CO2 26 26 28 28   BUN 24* 22 21 21   CREATININE 1.4 1.2 1.3 1.2   CALCIUM 9.1 9.2 9.3 9.0   MG 1.8  --   --  1.8    and CBC   Recent Labs   Lab 05/18/23  0328 05/19/23  0429   WBC 6.88 6.53   HGB 8.4* 8.3*   HCT 27.6* 27.3*    323     Microbiology:   Blood Culture   Lab Results   Component Value Date    LABBLOO No Growth to date 05/15/2023    LABBLOO No Growth to date 05/15/2023    LABBLOO No Growth to date 05/15/2023    LABBLOO No Growth to date 05/15/2023   , Urine Culture    Lab Results   Component Value Date    LABURIN KLEBSIELLA PNEUMONIAE  10,000 - 49,999 cfu/ml   (A) 05/15/2023       Pending Diagnostic Studies:       Procedure Component Value Units Date/Time    Basic Metabolic Panel (BMP) [[id number]] Collected: 05/16/23 1638    Order Status: Sent Lab Status: In process Updated: 05/16/23 1640    Specimen: Blood     Narrative:      Collection has been rescheduled by DF2 at 05/16/2023 12:44 Reason:   Patient unavailable.  In MRI.  RENDI [id number].  Was in a procedure     Basic Metabolic Panel (BMP) [[id number]] Collected: 05/16/23 1640    Order Status: Sent Lab Status: In process Updated:  05/16/23 1640    Specimen: Blood     Narrative:      Collection has been rescheduled by VS1 at 05/16/2023 09:05 Reason:   Patient unavailable Nurse eduar notified  Collection has been rescheduled by DF2 at 05/16/2023 12:44 Reason:   Patient unavailable.  In MRI.  RENDI [id number].  Needs ro be rescheduled              Indwelling Lines/Drains at time of discharge:   Lines/Drains/Airways       none              Time spent on the discharge of patient: 40 minutes         Monica Mathis DO PGY1  Department of Hospital Medicine  [facility] - Intensive Care ([place])

## 2023-05-19 NOTE — ASSESSMENT & PLAN NOTE
- UE venous ultrasound 2022 with thrombosis of the right subclavian, axillary, basilic, and cephalic veins with notation of PICC at that time  - treated with Eliquis and remains on Eliquis  - no significant swelling noted

## 2023-05-19 NOTE — PHYSICIAN QUERY
PT Name: Saji Castañeda  MR #: [id number]     DOCUMENTATION CLARIFICATION     CDS/: Gilma Aranda               Contact information:  This form is a permanent document in the medical record.    Query Date: May 19, 2023    By submitting this query, we are merely seeking further clarification of documentation.  Please utilize your independent clinical judgment when addressing the question(s) below.    The Medical Record contains the following:   Indicator Supporting Clinical Findings Location in Medical Record    Kidney (Renal) Insufficiency     x Kidney (Renal) Failure/Injury YNES (acute kidney injury)  Creatinine 1.7 on admit, recent baseline around 1.6 since March 2023, however was normal previous to this    YNES 5/15/23 H&P        5/19/23 D/C Summary    Nephrotoxic Agents     x BUN/Creatinine           GFR 5/15 Creat  1.6  5/16 Creat  1.4  5/17 Creat   1.5  5/18 Creat  1.3 5/15-5/18/23 Lab    Urine: Casts         Eosinophils      Urine Output      Dehydration      Nausea/Vomiting      Dialysis/CRRT     x Treatment 5/16 IV 0.9% NaCl @ 125 ml/hr 5/16/23 Infusion Summary    Other         [facility] approved diagnostic criteria for acute kidney injury is based on KDIGO criteria:    An increase in serum creatinine > 0.3mg/dl within 48 hours  OR  Increase in serum creatinine to > 1.5x baseline, which is known or presumed to have occurred within the prior 7 days  OR  Urine volume <0.5 ml/kg/hr for 6 hours       The clinical guidelines noted above are only a system guideline. It does not replace the providers clinical judgment.     Provider, please clarify the YNES diagnosis:     [  x  ] Unspecified Acute Kidney Failure/Injury     [    ] Acute Renal Insufficiency  - Consider if SCr rise is transient and normalizes quickly with no efforts at real resuscitation of vital signs and perfusion   [   ] YNES ruled out   [    ] Other (please specify): _______________________________         Please document in your progress  notes daily for the duration of treatment until resolved and include in your discharge summary.    References:   KDIGO Clinical Practice Guideline for Acute Kidney Injury. (2012, March). Retrieved October 21, 2020, from https://kdigo.org/wp-content/uploads/2016/10/ARGZZ-5911-EJV-Guideline-English.pdf    KVNG Garland MD, HERIBERTO Lemons MD, & VANESSA Ahuja MD. (1960). Renal medullary necrosis [Abstract]. The American Journal of Medicine, 29(1), 132-156. Doi:https://www.sciencedirect.com/science/article/abs/pii/8165514411017760    VANESSA Ramirez MD, & SONAL Gates MD, MS. (2020, June 18). Definition and staging of chronic kidney disease in adults ([id number] HERIBERTO Celestin MD, ScD & [id number] EMILY Cornelius MD, MSc, Eds.). Retrieved October 21, 2020, from https://www.[employer]/contents/definition-and-staging-of-chronic-kidney-disease-in-adults?search=ckd%20staging&source=search_result&selectedTitle=1~150&usage_type=default&display_rank=1     JOSEF Chakraborty MD, FACP. (2015, Rina 15). Acute kidney injury revisited. Retrieved October 21, 2020, from https://acphospitalist.org/archives/2015/06/coding-acute-kidney-injury.htm    RUSSEL Molina MD. (2019, July). Renal Cortical Necrosis. Retrieved October 21, 2020, from https://www.[employer]/professional/genitourinary-disorders/renovascular-disorders/renal-cortical-necrosis    Form No. 12526

## 2023-05-19 NOTE — HPI
73-year-old male with PMH of bilateral lower extremity chronic osteomyelitis s/p left foot metatarsal amputation, hypertension, CKD, RUE DVT on anticoagulation presented with worsening bilateral lower extremity wounds at [facility].  Imaging concerning for osteo.  Patient was seen by Podiatry, vascular surgery and Infectious Disease.  Vascular surgery recommended AKA given nonhealing wounds but patient declined.  Superficial wound cultures positive.  Blood cultures negative.  Infectious Disease recommended against antibiotics as compromised blood flow to lower extremities in setting of PAD would affect antibiotic delivery.  Urine cultures 5/15 grew Klebsiella pneumoniae for which she was started on ceftriaxone.  Hyperglycemic for which he was placed on IV insulin, since transitioned to basal/bolus insulin.  Per report, case was discussed with interventional Cardiology Dr. Barlow who agreed to evaluate for possible revascularization and recommended transfer to Hospital Medicine.    Patient had breakfast this morning and is currently on Eliquis for DVT.  He denies any new complaints during my encounter.  Vital signs stable.  He was transferred to ICU per transfer protocol.  Hemodynamically stable with no ICU needs.  Transfer orders to med/tele placed.

## 2023-05-19 NOTE — HPI
74yo male with HTN, DMII- insulin dependent, debility- wheelchair bound, type I AVB, PVD, chronic osteomyelitis, TUI, CELINE and RUE DVT on Eliquis who was transferred from [facility] for evaluation of CLI and for possible revascularization. Mr. Castañeda reports his wounds have been present for years and have not gotten better. He reports bilateral heel wounds along with wound to his right amputation site. He reports wound to the surgical site to his left since 2012 (chart reviewed with surgical intervention [id number]). He presented to the ER at [facility] with concerns for worsening wounds. He was admitted to Hospital Medicine and vascular surgery was consulted with recommendation for AKA however he declined therefore [facility] Interventional Cardiology was consulted for evaluation. He reports being wheelchair bound for the past several months and does not walk independently. WBCs 6K H&H 8.3&27.3 Blood cultures NGTD right foot culture with pseudomonas, proteus mirabilis, klebsiella pneumoniae, proteus penneri. On IV Rocephin. HR and BP stable.      Of note on chart review, history of non compliance with missed wound care appts due to transportation issues. Also referred to Cards clinic by Dr. Farrell with missed appts with Dr. Wu and Dr. Henry making compliance a concern

## 2023-05-19 NOTE — PLAN OF CARE
Problem: Adult Inpatient Plan of Care  Goal: Plan of Care Review  Outcome: Ongoing, Progressing  Goal: Patient-Specific Goal (Individualized)  Outcome: Ongoing, Progressing  Goal: Absence of Hospital-Acquired Illness or Injury  Outcome: Ongoing, Progressing  Goal: Optimal Comfort and Wellbeing  Outcome: Ongoing, Progressing  Goal: Readiness for Transition of Care  Outcome: Ongoing, Progressing     Problem: Oral Intake Inadequate (Acute Kidney Injury/Impairment)  Goal: Optimal Nutrition Intake  Outcome: Ongoing, Progressing     Problem: Impaired Wound Healing  Goal: Optimal Wound Healing  Outcome: Ongoing, Not Progressing

## 2023-05-19 NOTE — ASSESSMENT & PLAN NOTE
H/o chronic osteomyelitis s/p L TMA with severe underlying PAD  Vascular surgery at [facility] recommends bilateral AKA but patient adamantly declines     Cardiology consult to evaluate revascularization candidacy.   Currently on eliquis, if determined a candidate for revascularization- will need to switch to heparin or lovenox     Not on antibiotics per ID recommendations at [facility] given poor blood flow. Clinically stable at this time.    Wound care consult. Will place orders based on [facility] recommendations.

## 2023-05-19 NOTE — PLAN OF CARE
[facility] ([facility]  Discharge Final Note    Primary Care Provider: [facility]    Expected Discharge Date: 5/19/2023    Final Discharge Note (most recent)       Final Note - 05/19/23 0908          Final Note    Assessment Type Final Discharge Note     Anticipated Discharge Disposition Discharged to Other Facility with Planned Readmission   [facility] [phone]    What phone number can be called within the next 1-3 days to see how you are doing after discharge? [phone]        Post-Acute Status    Post-Acute Authorization Other     Coverage [employer] MANAGED MEDICARE - [employer] [facility] HMO PPO SPECIAL NEEDS     Other Status See Comments     Discharge Delays None known at this time                        Patient discharge to [facility] room K556.    5100  CM called and spoke to Marisol in Patient information and was not able to provide a number to the ICU unit.    2723  LEW informed sister of transfer and assigned CM with Marychuy Yan # [phone]     Keerthi Alex RN  Case Management  [facility]  [phone]

## 2023-05-19 NOTE — PROGRESS NOTES
The pt's d/c assessment was completed at [facility] 5/16/23. The pt is a transfer from [facility].      [facility] - Intensive Care ([place] [address])  Initial Discharge Assessment        Primary Care Provider: [facility] [place]     Admission Diagnosis: Peripheral vascular disease [I73.9]  Wound cellulitis [L03.90]  Chest pain [R07.9]     Admission Date: 5/15/2023  Expected Discharge Date: 5/19/2023     Transition of Care Barriers: Other (see comments) (resides with sister and sister is requesting help)     Payor: [employer] MEDICARE / Plan: [employer] HMO PPO SPECIAL NEEDS / Product Type: Medicare Advantage /      Extended Emergency Contact Information  Primary Emergency Contact: Kandy Castañeda   [place]  Mobile Phone: [phone]  Relation: Sister  Secondary Emergency Contact: January Nicholson   [place]  Mobile Phone: [phone]  Relation: Sister     Discharge Plan A: Long-term acute care facility (LTAC)  Discharge Plan B: New Nursing Home placement - senior living care facility        [id number] - [place] - [address] AT [facility] [address]  [facility] [address] [address]  Phone: [phone] Fax: [phone]        Initial Assessment (most recent)         Adult Discharge Assessment - 05/16/23 1337                    Discharge Assessment     Assessment Type Discharge Planning Assessment      Confirmed/corrected address, phone number and insurance Yes      Source of Information family   GarryKandy (Sister)   [phone] (Mobile)     When was your last doctors appointment? --   2/2023     Reason For Admission Chronic osteomyelitis      People in Home sibling(s)      Do you expect to return to your current living situation? Yes      Do you have help at home or someone to help you manage your care at home? No      Prior to hospitilization cognitive status: Alert/Oriented      Current cognitive  status: Alert/Oriented      Home Accessibility stairs to enter home      Number of Stairs, Main Entrance six      Stair Railings, Main Entrance railings safe and in good condition      Equipment Currently Used at Home wheelchair;glucometer      Readmission within 30 days? No      Patient currently being followed by outpatient case management? No   will refer     Do you currently have service(s) that help you manage your care at home? No      Do you take prescription medications? Yes      Do you have prescription coverage? Yes      Coverage HUMANA MANAGED MEDICARE - [facility]NA [facility] SPECIAL NEEDS      Do you have any problems affording any of your prescribed medications? No      Is the patient taking medications as prescribed? yes      Who is going to help you get home at discharge? Kandy Castañeda (Sister)   [phone] (Mobile)   but states she needs help d/t stroke     How do you get to doctors appointments? health plan transportation      Are you on dialysis? No      Do you take coumadin? No      Discharge Plan A Long-term acute care facility (LTAC)      Discharge Plan B New Nursing Home placement - snf care facility      Discharge Plan discussed with: Sibling      Name(s) and Number(s) Kandy Castañeda (Sister)   [phone] (Mobile)   Bob Nicholson [phone]     Transition of Care Barriers Other (see comments)   resides with sister and sister is requesting help           Physical Activity     On average, how many days per week do you engage in moderate to strenuous exercise (like a brisk walk)? 0 days      On average, how many minutes do you engage in exercise at this level? 10 min            Financial Resource Strain     How hard is it for you to pay for the very basics like food, housing, medical care, and heating? Not very hard            Housing Stability     In the last 12 months, was there a time when you were not able to pay the mortgage or rent on time? No      In the last 12 months, was  "there a time when you did not have a steady place to sleep or slept in a shelter (including now)? No            Transportation Needs     In the past 12 months, has lack of transportation kept you from medical appointments or from getting medications? No      In the past 12 months, has lack of transportation kept you from meetings, work, or from getting things needed for daily living? No            Food Insecurity     Within the past 12 months, you worried that your food would run out before you got the money to buy more. Never true      Within the past 12 months, the food you bought just didn't last and you didn't have money to get more. Never true            Stress     Do you feel stress - tense, restless, nervous, or anxious, or unable to sleep at night because your mind is troubled all the time - these days? Rather much            Social Connections     In a typical week, how many times do you talk on the phone with family, friends, or neighbors? Never      How often do you get together with friends or relatives? Never      How often do you attend Buddhism or Samaritan services? Never      How often do you attend meetings of the clubs or organizations you belong to? Never      Are you , , , , never , or living with a partner? Never             Alcohol Use     Q1: How often do you have a drink containing alcohol? Patient refused      Q2: How many drinks containing alcohol do you have on a typical day when you are drinking? Patient refused      Q3: How often do you have six or more drinks on one occasion? Patient refused            OTHER     Name(s) of People in Home Kandy Castañeda (Sister)   [phone] (Mobile)                                       Cm spoke to sister, Kandy Castañeda (Sister) [phone] (Mobile) and patient resides with sister. Patients resides in a house with sister and with 6 LON.  Patient has a w/c for mobility and patients sister states, " I am not " "able to take care of him, he needs a bath and I had a stroke and I am not able."  Patient is not HD and is on a BT: Eliquis 5 mg BID.  Patients pharmacy is [id number].  Patient sister stated. " He can get his med's from [facility], if they can deliver it to the bedside."  Patient diop not drive and patients uses health  plan transportation.  Patient last HH services were with [facility] back in 3/15/23. Patients sister states the patient maybe with [facility].     CM sent an e-mail to Jackelyn WANG with [facility] and waiting on a response.     Patient may benefit from LTAC service  vs NH placement.  Patent refuses NH placement.     Will refer to OP CM  "

## 2023-05-19 NOTE — HOSPITAL COURSE
5/19/2023 per HPI   5/23/2023 CBC and BMP unchanged. HR down to 30s-40s with 2nd degree AVB at rest and up to 70s with activity- patient asymptomatic.

## 2023-05-19 NOTE — H&P
[facility] Medicine  History & Physical    Patient Name: Saji Castañeda  MRN: [id number]  Patient Class: IP- Inpatient  Admission Date: 5/19/2023  Attending Physician: Anna Mcdonnell MD   Primary Care Provider: [facility]         Patient information was obtained from patient, past medical records and ER records.     Subjective:     Principal Problem:Chronic osteomyelitis    Chief Complaint:   Chief Complaint   Patient presents with    Transfer from [facility] for revascularization        HPI: 73-year-old male with PMH of bilateral lower extremity chronic osteomyelitis s/p left foot metatarsal amputation, hypertension, CKD, RUE DVT on anticoagulation presented with worsening bilateral lower extremity wounds at [facility].  Imaging concerning for osteo.  Patient was seen by Podiatry, vascular surgery and Infectious Disease.  Vascular surgery recommended AKA given nonhealing wounds but patient declined.  Superficial wound cultures positive.  Blood cultures negative.  Infectious Disease recommended against antibiotics as compromised blood flow to lower extremities in setting of PAD would affect antibiotic delivery.  Urine cultures 5/15 grew Klebsiella pneumoniae for which she was started on ceftriaxone.  Hyperglycemic for which he was placed on IV insulin, since transitioned to basal/bolus insulin.  Per report, case was discussed with interventional Cardiology Dr. Barlow who agreed to evaluate for possible revascularization and recommended transfer to Hospital Medicine.    Patient had breakfast this morning and is currently on Eliquis for DVT.  He denies any new complaints during my encounter.  Vital signs stable.  He was transferred to ICU per transfer protocol.  Hemodynamically stable with no ICU needs.  Transfer orders to med/tele placed.      Past Medical History:   Diagnosis Date    Arthritis     legs    Diabetes mellitus     Diabetes mellitus, type 2     Hyperlipidemia      Hypertension     Osteomyelitis        Past Surgical History:   Procedure Laterality Date    ANGIOGRAPHY OF LOWER EXTREMITY N/A 2/3/2021    Procedure: Angiogram Extremity Bilateral;  Surgeon: Ernst Chacko MD;  Location: [facility] OR [address] [place] [address];  Service: Peripheral Vascular;  Laterality: N/A;  7.4 mintues fluoroscopy time  816.15 mGy  170.17 Gycm2    AORTOGRAPHY WITH EXTREMITY RUNOFF Bilateral 2/3/2021    Procedure: AORTOGRAM, WITH EXTREMITY RUNOFF;  Surgeon: Ernst Chacko MD;  Location: [facility] OR [address] [place] [address];  Service: Peripheral Vascular;  Laterality: Bilateral;    DEBRIDEMENT OF FOOT Left 2/23/2021    Procedure: DEBRIDEMENT, LEFT HEEL;  Surgeon: Mayra Schroeder DPM;  Location: [facility] OR [address] [place] [address];  Service: Podiatry;  Laterality: Left;    FOOT AMPUTATION  October 2010    left high midfoot amputation       Review of patient's allergies indicates:  No Known Allergies    Current Facility-Administered Medications on File Prior to Encounter   Medication    [DISCONTINUED] acetaminophen tablet 650 mg    [DISCONTINUED] apixaban tablet 5 mg    [DISCONTINUED] atorvastatin tablet 80 mg    [DISCONTINUED] cefTRIAXone (ROCEPHIN) 1 g in dextrose 5 % in water (D5W) 5 % 50 mL IVPB (MB+)    [DISCONTINUED] clopidogreL tablet 75 mg    [DISCONTINUED] collagenase ointment    [DISCONTINUED] dextrose 10% bolus 125 mL 125 mL    [DISCONTINUED] dextrose 10% bolus 250 mL 250 mL    [DISCONTINUED] dextrose 40 % gel 15,000 mg    [DISCONTINUED] dextrose 40 % gel 30,000 mg    [DISCONTINUED] glucagon (human recombinant) injection 1 mg    [DISCONTINUED] insulin aspart U-100 pen 1-10 Units    [DISCONTINUED] insulin aspart U-100 pen 10 Units    [DISCONTINUED] insulin aspart U-100 pen 13 Units    [DISCONTINUED] insulin detemir U-100 (Levemir) pen 25 Units    [DISCONTINUED] insulin detemir U-100 (Levemir) pen 34 Units    [DISCONTINUED] insulin detemir U-100 (Levemir) pen 40 Units    [DISCONTINUED] isosorbide dinitrate tablet 10 mg     "[DISCONTINUED] losartan-hydrochlorothiazide 100-25 mg per tablet 1 tablet    [DISCONTINUED] melatonin tablet 6 mg    [DISCONTINUED] mupirocin 2 % ointment    [DISCONTINUED] naloxone 0.4 mg/mL injection 0.02 mg    [DISCONTINUED] NIFEdipine 24 hr tablet 60 mg    [DISCONTINUED] oxyCODONE immediate release tablet 5 mg    [DISCONTINUED] pantoprazole EC tablet 40 mg    [DISCONTINUED] sodium chloride 0.9% flush 10 mL    [DISCONTINUED] tamsulosin 24 hr capsule 0.4 mg     Current Outpatient Medications on File Prior to Encounter   Medication Sig    apixaban (ELIQUIS) 5 mg Tab Take 1 tablet (5 mg total) by mouth 2 (two) times daily.    ascorbic acid, vitamin C, (VITAMIN C) 500 MG tablet Take 500 mg by mouth once daily.    aspirin (ECOTRIN) 81 MG EC tablet Take 81 mg by mouth once daily.    B COMPLEX-VITAMIN B12 tablet Take 1 tablet by mouth once daily.    BD ULTRA-FINE ADITYA PEN NEEDLE 32 gauge x 5/32" Ndle USE FOUR TIMES DAILY WITH MEALS AND NIGHTLY    blood sugar diagnostic (CONTOUR TEST STRIPS) Strp Inject 1 strip into the skin 2 (two) times daily before meals.    clopidogreL (PLAVIX) 75 mg tablet Take 1 tablet (75 mg total) by mouth once daily.    furosemide (LASIX) 40 MG tablet Take 40 mg by mouth once daily.    gabapentin (NEURONTIN) 100 MG capsule Take 2 capsules (200 mg total) by mouth 2 (two) times daily. (Patient taking differently: Take 300 mg by mouth 2 (two) times daily.)    glipiZIDE (GLUCOTROL) 10 MG tablet Take 20 mg by mouth 2 (two) times a day.    hydrALAZINE (APRESOLINE) 25 MG tablet Take 1 tablet (25 mg total) by mouth 3 (three) times daily. (Patient taking differently: Take 25 mg by mouth 2 (two) times a day.)    insulin aspart U-100 (NOVOLOG) 100 unit/mL (3 mL) InPn pen Inject 20 Units into the skin 3 (three) times daily with meals. (Patient not taking: Reported on 5/17/2023)    isosorbide dinitrate (ISORDIL) 10 MG tablet Take 1 tablet (10 mg total) by mouth 3 (three) times daily.    NILDA KING U-100 " INSULIN glargine 100 units/mL SubQ pen Inject 45 Units into the skin every evening. (Patient taking differently: Inject 50 Units into the skin every evening.)    losartan-hydrochlorothiazide 100-25 mg (HYZAAR) 100-25 mg per tablet Take 1 tablet by mouth once daily.    metFORMIN (GLUCOPHAGE-XR) 500 MG ER 24hr tablet Take 1,000 mg by mouth 2 (two) times a day.    MICROLET LANCET Misc     NIFEdipine (ADALAT CC) 60 MG TbSR Take 60 mg by mouth once daily.    pantoprazole (PROTONIX) 40 MG tablet Take 40 mg by mouth once daily.    rosuvastatin (CRESTOR) 40 MG Tab Take 40 mg by mouth once daily.    tamsulosin (FLOMAX) 0.4 mg Cap Take 0.4 mg by mouth once daily.    [DISCONTINUED] hydroCHLOROthiazide (HYDRODIURIL) 25 MG tablet Take 0.5 tablets (12.5 mg total) by mouth every Mon, Wed, Fri. Beginning on 7/4/2022     Family History       Problem Relation (Age of Onset)    Cancer Brother    Diabetes Mother, Sister    Heart disease Mother    Stroke Sister          Tobacco Use    Smoking status: Never    Smokeless tobacco: Never   Substance and Sexual Activity    Alcohol use: No     Comment: occassional    Drug use: No    Sexual activity: Not Currently     Review of Systems   Constitutional:  Negative for appetite change and fever.   Respiratory:  Negative for cough and shortness of breath.    Cardiovascular:  Negative for chest pain and leg swelling.   Gastrointestinal:  Negative for abdominal pain, constipation and diarrhea.   Skin:  Positive for wound.   Neurological:  Negative for light-headedness and headaches.   Psychiatric/Behavioral:  Negative for confusion.    Objective:     Vital Signs (Most Recent):  Temp: 98.4 °F (36.9 °C) (05/19/23 1115)  Pulse: 65 (05/19/23 1300)  Resp: 19 (05/19/23 1300)  BP: (!) 154/71 (05/19/23 1300)  SpO2: 97 % (05/19/23 1300) Vital Signs (24h Range):  Temp:  [97.9 °F (36.6 °C)-99.3 °F (37.4 °C)] 98.4 °F (36.9 °C)  Pulse:  [54-75] 65  Resp:  [15-35] 19  SpO2:  [94 %-100 %] 97 %  BP:  (118-167)/(51-78) 154/71     Weight: 111.5 kg (245 lb 13 oz)  Body mass index is 36.3 kg/m².     Physical Exam  Vitals and nursing note reviewed.   Constitutional:       General: He is not in acute distress.     Appearance: He is well-developed. He is obese. He is not diaphoretic.   HENT:      Head: Normocephalic and atraumatic.   Eyes:      General: No scleral icterus.  Neck:      Trachea: No tracheal deviation.   Cardiovascular:      Rate and Rhythm: Normal rate and regular rhythm.      Heart sounds: Normal heart sounds. No murmur heard.  Pulmonary:      Effort: Pulmonary effort is normal. No respiratory distress.      Breath sounds: Normal breath sounds. No wheezing.   Abdominal:      General: Bowel sounds are normal. There is no distension.      Palpations: Abdomen is soft.      Tenderness: There is no abdominal tenderness.   Skin:     General: Skin is warm and dry.      Comments: Bilateral LE with chronic skin changes, wounds/ bandaged. S/p L metatarsal amputation   Neurological:      Mental Status: He is alert and oriented to person, place, and time.   Psychiatric:         Behavior: Behavior normal.              Significant Labs: All pertinent labs within the past 24 hours have been reviewed.    Significant Imaging: I have reviewed all pertinent imaging results/findings within the past 24 hours.    Assessment/Plan:     * Chronic osteomyelitis  H/o chronic osteomyelitis s/p L TMA with severe underlying PAD  Vascular surgery at [facility] recommends bilateral AKA but patient adamantly declines     Cardiology consult to evaluate revascularization candidacy.   Currently on eliquis, if determined a candidate for revascularization- will need to switch to heparin or lovenox     Not on antibiotics per ID recommendations at [facility] given poor blood flow. Clinically stable at this time.    Wound care consult. Will place orders based on [facility] recommendations.         UTI (urinary tract infection)  Urine  culture 5/15 growing Klebseila    On ceftriaxone to complete 7 days       Goals of care, counseling/discussion  Advance Care Planning     Code Status  In light of the patient's medical co-morbidities, I engaged the patient in a conversation about the patient's preferences for care at the very end of life. The patient wishes to have aggressive measures including CPR, intubation. I communicated to the patient that a full code order will be placed in the chart.  I spent a total of 5 minutes engaging the patient in this advance care planning discussion.    Does not have significant other or children. Lives with siblings and would like his siblings specially the sister younger to him Meliza to make medical decisions for him if he is unable to.          Chronic deep vein thrombosis (DVT) of right upper extremity  On eliquis      PVD (peripheral vascular disease)  As above      Asymptomatic Mobitz (type) I (Wenckebach's) atrioventricular block  Not on beta blocker. Avoid AV tj blocking agent  Telemetry       Insulin dependent type 2 diabetes mellitus  Patient's FSGs are uncontrolled due to hyperglycemia on current medication regimen.  Last A1c reviewed-   Lab Results   Component Value Date    HGBA1C 8.4 (H) 03/10/2023     Most recent fingerstick glucose reviewed-   Recent Labs   Lab 05/18/23  1956 05/19/23  0732 05/19/23  1114 05/19/23  1208   POCTGLUCOSE 280* 277* 236* 226*     Current correctional scale  Medium  Increase anti-hyperglycemic dose as follows-   Antihyperglycemics (From admission, onward)      Start     Stop Route Frequency Ordered    05/19/23 1145  insulin detemir U-100 (Levemir) pen 25 Units         -- SubQ 2 times daily 05/19/23 1140    05/19/23 1145  insulin aspart U-100 pen 15 Units         -- SubQ 3 times daily with meals 05/19/23 1140    05/19/23 1138  insulin aspart U-100 pen 1-10 Units         -- SubQ Before meals & nightly PRN 05/19/23 1140          Hold Oral hypoglycemics while patient is in  the hospital.    Essential hypertension  Stable  Continue home meds        VTE Risk Mitigation (From admission, onward)           Ordered     apixaban tablet 5 mg  2 times daily         05/19/23 1140                    Transfer orders to floor placed.       Anna Mcdonnell MD  Department of Hospital Medicine  [place] - Intensive Care

## 2023-05-20 LAB
ANION GAP SERPL CALC-SCNC: 8 MMOL/L (ref 8–16)
BACTERIA BLD CULT: NORMAL
BACTERIA BLD CULT: NORMAL
BACTERIA SPEC AEROBE CULT: ABNORMAL
BACTERIA SPEC AEROBE CULT: ABNORMAL
BASOPHILS # BLD AUTO: 0.04 K/UL (ref 0–0.2)
BASOPHILS NFR BLD: 0.6 % (ref 0–1.9)
BUN SERPL-MCNC: 20 MG/DL (ref 8–23)
CALCIUM SERPL-MCNC: 8.9 MG/DL (ref 8.7–10.5)
CHLORIDE SERPL-SCNC: 102 MMOL/L (ref 95–110)
CO2 SERPL-SCNC: 27 MMOL/L (ref 23–29)
CREAT SERPL-MCNC: 1.1 MG/DL (ref 0.5–1.4)
DIFFERENTIAL METHOD: ABNORMAL
EOSINOPHIL # BLD AUTO: 0.5 K/UL (ref 0–0.5)
EOSINOPHIL NFR BLD: 7.2 % (ref 0–8)
ERYTHROCYTE [DISTWIDTH] IN BLOOD BY AUTOMATED COUNT: 16.9 % (ref 11.5–14.5)
EST. GFR  (NO RACE VARIABLE): >60 ML/MIN/1.73 M^2
GLUCOSE SERPL-MCNC: 122 MG/DL (ref 70–110)
HCT VFR BLD AUTO: 25.5 % (ref 40–54)
HGB BLD-MCNC: 7.9 G/DL (ref 14–18)
IMM GRANULOCYTES # BLD AUTO: 0.02 K/UL (ref 0–0.04)
IMM GRANULOCYTES NFR BLD AUTO: 0.3 % (ref 0–0.5)
LYMPHOCYTES # BLD AUTO: 1.5 K/UL (ref 1–4.8)
LYMPHOCYTES NFR BLD: 21.8 % (ref 18–48)
MCH RBC QN AUTO: 26.4 PG (ref 27–31)
MCHC RBC AUTO-ENTMCNC: 31 G/DL (ref 32–36)
MCV RBC AUTO: 85 FL (ref 82–98)
MONOCYTES # BLD AUTO: 0.6 K/UL (ref 0.3–1)
MONOCYTES NFR BLD: 8.4 % (ref 4–15)
NEUTROPHILS # BLD AUTO: 4.1 K/UL (ref 1.8–7.7)
NEUTROPHILS NFR BLD: 61.7 % (ref 38–73)
NRBC BLD-RTO: 0 /100 WBC
PLATELET # BLD AUTO: 339 K/UL (ref 150–450)
PMV BLD AUTO: 9.8 FL (ref 9.2–12.9)
POCT GLUCOSE: 112 MG/DL (ref 70–110)
POCT GLUCOSE: 176 MG/DL (ref 70–110)
POCT GLUCOSE: 269 MG/DL (ref 70–110)
POCT GLUCOSE: 288 MG/DL (ref 70–110)
POTASSIUM SERPL-SCNC: 4.1 MMOL/L (ref 3.5–5.1)
RBC # BLD AUTO: 2.99 M/UL (ref 4.6–6.2)
SODIUM SERPL-SCNC: 137 MMOL/L (ref 136–145)
WBC # BLD AUTO: 6.64 K/UL (ref 3.9–12.7)

## 2023-05-20 PROCEDURE — 97161 PT EVAL LOW COMPLEX 20 MIN: CPT

## 2023-05-20 PROCEDURE — 99900035 HC TECH TIME PER 15 MIN (STAT)

## 2023-05-20 PROCEDURE — 93010 ELECTROCARDIOGRAM REPORT: CPT | Mod: ,,, | Performed by: INTERNAL MEDICINE

## 2023-05-20 PROCEDURE — 63600175 PHARM REV CODE 636 W HCPCS: Performed by: STUDENT IN AN ORGANIZED HEALTH CARE EDUCATION/TRAINING PROGRAM

## 2023-05-20 PROCEDURE — 21400001 HC TELEMETRY ROOM

## 2023-05-20 PROCEDURE — 97165 OT EVAL LOW COMPLEX 30 MIN: CPT

## 2023-05-20 PROCEDURE — 97530 THERAPEUTIC ACTIVITIES: CPT

## 2023-05-20 PROCEDURE — 93010 EKG 12-LEAD: ICD-10-PCS | Mod: ,,, | Performed by: INTERNAL MEDICINE

## 2023-05-20 PROCEDURE — 85025 COMPLETE CBC W/AUTO DIFF WBC: CPT | Performed by: STUDENT IN AN ORGANIZED HEALTH CARE EDUCATION/TRAINING PROGRAM

## 2023-05-20 PROCEDURE — 93005 ELECTROCARDIOGRAM TRACING: CPT

## 2023-05-20 PROCEDURE — 80048 BASIC METABOLIC PNL TOTAL CA: CPT | Performed by: STUDENT IN AN ORGANIZED HEALTH CARE EDUCATION/TRAINING PROGRAM

## 2023-05-20 PROCEDURE — 97535 SELF CARE MNGMENT TRAINING: CPT

## 2023-05-20 PROCEDURE — 25000003 PHARM REV CODE 250: Performed by: STUDENT IN AN ORGANIZED HEALTH CARE EDUCATION/TRAINING PROGRAM

## 2023-05-20 PROCEDURE — 36415 COLL VENOUS BLD VENIPUNCTURE: CPT | Performed by: STUDENT IN AN ORGANIZED HEALTH CARE EDUCATION/TRAINING PROGRAM

## 2023-05-20 RX ORDER — INSULIN ASPART 100 [IU]/ML
12 INJECTION, SOLUTION INTRAVENOUS; SUBCUTANEOUS
Status: DISCONTINUED | OUTPATIENT
Start: 2023-05-20 | End: 2023-05-25 | Stop reason: HOSPADM

## 2023-05-20 RX ADMIN — TAMSULOSIN HYDROCHLORIDE 0.4 MG: 0.4 CAPSULE ORAL at 09:05

## 2023-05-20 RX ADMIN — PANTOPRAZOLE SODIUM 40 MG: 40 TABLET, DELAYED RELEASE ORAL at 09:05

## 2023-05-20 RX ADMIN — APIXABAN 5 MG: 5 TABLET, FILM COATED ORAL at 08:05

## 2023-05-20 RX ADMIN — ISOSORBIDE DINITRATE 10 MG: 10 TABLET ORAL at 09:05

## 2023-05-20 RX ADMIN — INSULIN ASPART 12 UNITS: 100 INJECTION, SOLUTION INTRAVENOUS; SUBCUTANEOUS at 04:05

## 2023-05-20 RX ADMIN — LOSARTAN POTASSIUM 100 MG: 50 TABLET, FILM COATED ORAL at 09:05

## 2023-05-20 RX ADMIN — MUPIROCIN: 20 OINTMENT TOPICAL at 08:05

## 2023-05-20 RX ADMIN — ATORVASTATIN CALCIUM 80 MG: 40 TABLET, FILM COATED ORAL at 09:05

## 2023-05-20 RX ADMIN — MUPIROCIN: 20 OINTMENT TOPICAL at 09:05

## 2023-05-20 RX ADMIN — INSULIN ASPART 6 UNITS: 100 INJECTION, SOLUTION INTRAVENOUS; SUBCUTANEOUS at 04:05

## 2023-05-20 RX ADMIN — ISOSORBIDE DINITRATE 10 MG: 10 TABLET ORAL at 04:05

## 2023-05-20 RX ADMIN — CEFTRIAXONE 1 G: 1 INJECTION, POWDER, FOR SOLUTION INTRAMUSCULAR; INTRAVENOUS at 12:05

## 2023-05-20 RX ADMIN — INSULIN ASPART 6 UNITS: 100 INJECTION, SOLUTION INTRAVENOUS; SUBCUTANEOUS at 12:05

## 2023-05-20 RX ADMIN — INSULIN ASPART 12 UNITS: 100 INJECTION, SOLUTION INTRAVENOUS; SUBCUTANEOUS at 12:05

## 2023-05-20 RX ADMIN — ISOSORBIDE DINITRATE 10 MG: 10 TABLET ORAL at 08:05

## 2023-05-20 RX ADMIN — INSULIN ASPART 2 UNITS: 100 INJECTION, SOLUTION INTRAVENOUS; SUBCUTANEOUS at 08:05

## 2023-05-20 RX ADMIN — CLOPIDOGREL BISULFATE 75 MG: 75 TABLET ORAL at 09:05

## 2023-05-20 RX ADMIN — COLLAGENASE SANTYL: 250 OINTMENT TOPICAL at 09:05

## 2023-05-20 RX ADMIN — APIXABAN 5 MG: 5 TABLET, FILM COATED ORAL at 09:05

## 2023-05-20 NOTE — PT/OT/SLP EVAL
"Physical Therapy Evaluation and Treatment    Patient Name:  Saji Castañeda   MRN:  [id number]    Recommendations:     Discharge Recommendations: LTACH (long-term acute care hospital)   Discharge Equipment Recommendations: slide board, hospital bed (w/c cushion)   Barriers to discharge: Inaccessible home and Decreased caregiver support    Assessment:     Saji Castañeda is a 73 y.o. male admitted with a medical diagnosis of Chronic osteomyelitis.  He presents with the following impairments/functional limitations: weakness, impaired endurance, impaired self care skills, impaired sensation, impaired functional mobility, gait instability, decreased lower extremity function, decreased safety awareness, impaired cognition, decreased ROM, impaired skin, edema, orthopedic precautions  PT evaluation was completed. Pt required CG/SBA with rolling L/R and supine<>sit needing cues for proper technique and safety. Pt was educated in WB precautions: L LE NWB and R LE PWB ( R heel only) in Darco shoe. Pt  required MODA x 1 for bed<>w/c scoot/ squat pivot transfer needing assist /cues for adherence B LE WB status. Pt required increased time to perform functional tasks. Pt was resistant to follow B LE WB precautions and therapist's instructions for  proper technique/safety with bed<>w/c transfer. Recommend LTAC  upon d/c  to address wound care and PT training to address functional mobility deficits..    Rehab Prognosis: Fair; patient would benefit from acute skilled PT services to address these deficits and reach maximum level of function.    Recent Surgery: * No surgery found *      Plan:     During this hospitalization, patient to be seen 5 x/week to address the identified rehab impairments via therapeutic activities, therapeutic exercises, wheelchair management/training and progress toward the following goals:    Plan of Care Expires:  06/20/23    Subjective     Chief Complaint: " I need to take my time."  Patient/Family " Comments/goals: to return to PLOF  Pain/Comfort:  Pain Rating 1: 0/10  Pain Rating Post-Intervention 1: 0/10    Patients cultural, spiritual, Jainism conflicts given the current situation: no    Living Environment:  Pt lives with brother and sister in [facility], 3 LON with B rails to enter. Pt was taking sponge baths.  PLOF was  independence with bed mobility, bed<>w/c squat pivot transfer and w/c propulsion.  Prior to admission, patients level of function was Independent with mobility using w/c and bed<>w/c transfer. Per pt report, pt hasn't ambulated in awhile.  Equipment used at home: wheelchair, walker, standard.  DME owned (not currently used): standard walker.  Upon discharge, patient will have limited assistance from siblings.    Objective:     Communicated with RN prior to session.  Patient found HOB elevated with bed alarm, PureWick, telemetry, pressure relief boots  upon PT entry to room.    General Precautions: Standard, fall, contact  Orthopedic Precautions:RLE partial weight bearing, LLE non weight bearing (PWB R LE with heel only in darco shoe)   Braces:  (R darco shoe)  Respiratory Status: Room air    Exams:  Gross Motor Coordination:  impaired B LE due to decreased strength  Postural Exam:  Patient presented with the following abnormalities:    -       Rounded shoulders  Sensation: unable to assess due to  distal B  LE wrapped in bandages  Skin Integrity/Edema:      -       Skin integrity: distal B LE wrapped in bandages  RLE ROM: WFL except limited active/passive ankle DF  RLE Strength: grossly 3/5 hip/knee mm  LLE ROM: WFL except limited active/passive ankle DF ( fixed in PF  LLE Strength: grossly 3/5 hip/knee mm    Functional Mobility:  Bed Mobility:     Rolling Left:  stand by assistance using bed rail   Rolling Right: stand by assistance using bed rail  Supine to Sit: stand by assistance and contact guard assistance with cues for proper technique  Sit to Supine: stand by assistance and contact  guard assistance with cues  for proper technique  Transfers:     Bed to  wheel chair: moderate assistance  using  Scoot Pivot with assist/ cues for adherence of  L LE NWB and R  LE PWB (heel only), proper technique and safety  Balance: Static sit : G      AM-PAC 6 CLICK MOBILITY  Total Score:11       Treatment & Education:  Pt was educated in the role of PT and POC  Pt was educated in performing ROM to B LE in supine when not in therapy.  Pt performed supine<>sit as reported above and bed<>w/c scoot/squat pivot as reported above. Pt was resistant to following B LE WB precautions and following therapist's instructions with safe bed <>w/c transfer. Pt was educated in positioning w/c closest to R LE during bed<>w/c transfer to increase safety and efficiency.    Patient left HOB elevated with all lines intact, call button in reach, bed alarm on, and RN notified.    GOALS:   Multidisciplinary Problems       Physical Therapy Goals          Problem: Physical Therapy    Goal Priority Disciplines Outcome Goal Variances Interventions   Physical Therapy Goal     PT, PT/OT Ongoing, Progressing     Description: Goals to be met by: 23     Patient will increase functional independence with mobility by performin. Supine to sit with Modified [place]  2. Sit to supine with Modified [place]  3. Bed to chair transfer with Supervision  squat pivot or using sliding board   4. Wheelchair propulsion x 100 feet with Modified [place] using bilateral uppper extremities                         History:     Past Medical History:   Diagnosis Date    Arthritis     legs    Diabetes mellitus     Diabetes mellitus, type 2     Hyperlipidemia     Hypertension     Osteomyelitis        Past Surgical History:   Procedure Laterality Date    ANGIOGRAPHY OF LOWER EXTREMITY N/A 2/3/2021    Procedure: Angiogram Extremity Bilateral;  Surgeon: Ernst Chacko MD;  Location: [facility] OR [address] [place] [address];  Service: Peripheral Vascular;   Laterality: N/A;  7.4 mintues fluoroscopy time  816.15 mGy  170.17 Gycm2    AORTOGRAPHY WITH EXTREMITY RUNOFF Bilateral 2/3/2021    Procedure: AORTOGRAM, WITH EXTREMITY RUNOFF;  Surgeon: Ernst Chacko MD;  Location: [facility] OR [address] [place] [address];  Service: Peripheral Vascular;  Laterality: Bilateral;    DEBRIDEMENT OF FOOT Left 2/23/2021    Procedure: DEBRIDEMENT, LEFT HEEL;  Surgeon: Mayra Schroeder DPM;  Location: [facility] OR [address] [place] [address];  Service: Podiatry;  Laterality: Left;    FOOT AMPUTATION  October 2010    left high midfoot amputation       Time Tracking:     PT Received On: 05/20/23  PT Start Time: 0949     PT Stop Time: 1027  PT Total Time (min): 38 min     Billable Minutes: Evaluation 15 and Therapeutic Activity 15      05/20/2023

## 2023-05-20 NOTE — PROGRESS NOTES
[facility] Medicine  Progress Note    Patient Name: Saji Castañeda  MRN: [id number]  Patient Class: IP- Inpatient   Admission Date: 5/19/2023  Length of Stay: 1 days  Attending Physician: Anna Mcdonnell MD  Primary Care Provider: [facility]        Subjective:     Principal Problem:Chronic osteomyelitis      HPI:  73-year-old male with PMH of bilateral lower extremity chronic osteomyelitis s/p left foot metatarsal amputation, hypertension, CKD, RUE DVT on anticoagulation presented with worsening bilateral lower extremity wounds at [facility].  Imaging concerning for osteo.  Patient was seen by Podiatry, vascular surgery and Infectious Disease.  Vascular surgery recommended AKA given nonhealing wounds but patient declined.  Superficial wound cultures positive.  Blood cultures negative.  Infectious Disease recommended against antibiotics as compromised blood flow to lower extremities in setting of PAD would affect antibiotic delivery.  Urine cultures 5/15 grew Klebsiella pneumoniae for which she was started on ceftriaxone.  Hyperglycemic for which he was placed on IV insulin, since transitioned to basal/bolus insulin.  Per report, case was discussed with interventional Cardiology Dr. Barlow who agreed to evaluate for possible revascularization and recommended transfer to Hospital Medicine.    Patient had breakfast this morning and is currently on Eliquis for DVT.  He denies any new complaints during my encounter.  Vital signs stable.  He was transferred to ICU per transfer protocol.  Hemodynamically stable with no ICU needs.  Transfer orders to [facility]/tele placed.      Overview/Hospital Course:  No notes on file    Interval History:  Bradycardic with heart rate in the 30s.  Asymptomatic.  No new complaints.     Review of Systems   Respiratory:  Negative for shortness of breath.    Gastrointestinal:  Negative for abdominal pain and nausea.   Objective:     Vital Signs (Most  Recent):  Temp: 98.6 °F (37 °C) (05/20/23 1205)  Pulse: (!) 56 (05/20/23 1206)  Resp: 18 (05/20/23 1205)  BP: 136/61 (05/20/23 1205)  SpO2: 96 % (05/20/23 1205) Vital Signs (24h Range):  Temp:  [97.9 °F (36.6 °C)-98.6 °F (37 °C)] 98.6 °F (37 °C)  Pulse:  [39-88] 56  Resp:  [16-30] 18  SpO2:  [93 %-98 %] 96 %  BP: (125-154)/(57-68) 136/61     Weight: 111.5 kg (245 lb 13 oz)  Body mass index is 36.3 kg/m².    Intake/Output Summary (Last 24 hours) at 5/20/2023 1415  Last data filed at 5/19/2023 1741  Gross per 24 hour   Intake 240 ml   Output 350 ml   Net -110 ml         Physical Exam  Vitals and nursing note reviewed.   Constitutional:       General: He is not in acute distress.     Appearance: He is well-developed.   HENT:      Mouth/Throat:      Mouth: Mucous membranes are moist.   Eyes:      General: No scleral icterus.  Neck:      Trachea: No tracheal deviation.   Cardiovascular:      Rate and Rhythm: Regular rhythm. Bradycardia present.      Heart sounds: Normal heart sounds. No murmur heard.  Pulmonary:      Effort: Pulmonary effort is normal. No respiratory distress.      Breath sounds: Normal breath sounds. No wheezing.   Abdominal:      General: Bowel sounds are normal. There is no distension.      Palpations: Abdomen is soft.      Tenderness: There is no abdominal tenderness.   Musculoskeletal:      Right lower leg: Edema present.      Left lower leg: Edema present.      Comments: Bilateral LE wrapped, s/p L TMA   Skin:     General: Skin is warm and dry.   Neurological:      Mental Status: He is alert and oriented to person, place, and time.   Psychiatric:         Behavior: Behavior normal.           Significant Labs: All pertinent labs within the past 24 hours have been reviewed.    Significant Imaging: I have reviewed all pertinent imaging results/findings within the past 24 hours.      Assessment/Plan:      * Chronic osteomyelitis  H/o chronic osteomyelitis s/p L TMA with severe underlying PAD  Vascular  surgery at [facility] recommends bilateral AKA but patient adamantly declines     Cardiology consult to evaluate revascularization candidacy.   Currently on eliquis, if determined a candidate for revascularization- will need to switch to heparin or lovenox     Not on antibiotics per ID recommendations at [facility] given poor blood flow. Clinically stable at this time.    Wound care consult. Will place orders based on [facility] recommendations.         UTI (urinary tract infection)  Urine culture 5/15 growing Klebseila    On ceftriaxone to complete 7 days       Goals of care, counseling/discussion  Advance Care Planning     Code Status  In light of the patient's medical co-morbidities, I engaged the patient in a conversation about the patient's preferences for care at the very end of life. The patient wishes to have aggressive measures including CPR, intubation. I communicated to the patient that a full code order will be placed in the chart.  I spent a total of 5 minutes engaging the patient in this advance care planning discussion.    Does not have significant other or children. Lives with siblings and would like his siblings specially the sister younger to him Meliza to make medical decisions for him if he is unable to.          Chronic deep vein thrombosis (DVT) of right upper extremity  On eliquis      PVD (peripheral vascular disease)  As above      Asymptomatic Mobitz (type) I (Wenckebach's) atrioventricular block  Not on beta blocker. Avoid AV tj blocking agent  Telemetry       Insulin dependent type 2 diabetes mellitus  Patient's FSGs are controlled on current medication regimen.  Last A1c reviewed-   Lab Results   Component Value Date    HGBA1C 8.4 (H) 03/10/2023     Most recent fingerstick glucose reviewed-   Recent Labs   Lab 05/19/23  1602 05/19/23 2011 05/20/23  0515 05/20/23  1207   POCTGLUCOSE 144* 98 112* 288*     Current correctional scale  Medium  Decrease anti-hyperglycemic dose as  follows-   Antihyperglycemics (From admission, onward)      Start     Stop Route Frequency Ordered    05/20/23 1130  insulin aspart U-100 pen 12 Units         -- SubQ 3 times daily with meals 05/20/23 0805    05/20/23 0900  insulin detemir U-100 (Levemir) pen 18 Units         -- SubQ 2 times daily 05/20/23 0805    05/19/23 1138  insulin aspart U-100 pen 1-10 Units         -- SubQ Before meals & nightly PRN 05/19/23 1140          Hold Oral hypoglycemics while patient is in the hospital.    Essential hypertension  Stable  Continue home meds        VTE Risk Mitigation (From admission, onward)           Ordered     apixaban tablet 5 mg  2 times daily         05/19/23 1140                    Discharge Planning   OXANA:      Code Status: Full Code   Is the patient medically ready for discharge?:     Reason for patient still in hospital (select all that apply): Consult recommendations               Anna Mcdonnell MD  Department of Hospital Medicine   [facility] Surg

## 2023-05-20 NOTE — SUBJECTIVE & OBJECTIVE
Interval History:  Bradycardic with heart rate in the 30s.  Asymptomatic.  No new complaints.     Review of Systems   Respiratory:  Negative for shortness of breath.    Gastrointestinal:  Negative for abdominal pain and nausea.   Objective:     Vital Signs (Most Recent):  Temp: 98.6 °F (37 °C) (05/20/23 1205)  Pulse: (!) 56 (05/20/23 1206)  Resp: 18 (05/20/23 1205)  BP: 136/61 (05/20/23 1205)  SpO2: 96 % (05/20/23 1205) Vital Signs (24h Range):  Temp:  [97.9 °F (36.6 °C)-98.6 °F (37 °C)] 98.6 °F (37 °C)  Pulse:  [39-88] 56  Resp:  [16-30] 18  SpO2:  [93 %-98 %] 96 %  BP: (125-154)/(57-68) 136/61     Weight: 111.5 kg (245 lb 13 oz)  Body mass index is 36.3 kg/m².    Intake/Output Summary (Last 24 hours) at 5/20/2023 1415  Last data filed at 5/19/2023 1741  Gross per 24 hour   Intake 240 ml   Output 350 ml   Net -110 ml         Physical Exam  Vitals and nursing note reviewed.   Constitutional:       General: He is not in acute distress.     Appearance: He is well-developed.   HENT:      Mouth/Throat:      Mouth: Mucous membranes are moist.   Eyes:      General: No scleral icterus.  Neck:      Trachea: No tracheal deviation.   Cardiovascular:      Rate and Rhythm: Regular rhythm. Bradycardia present.      Heart sounds: Normal heart sounds. No murmur heard.  Pulmonary:      Effort: Pulmonary effort is normal. No respiratory distress.      Breath sounds: Normal breath sounds. No wheezing.   Abdominal:      General: Bowel sounds are normal. There is no distension.      Palpations: Abdomen is soft.      Tenderness: There is no abdominal tenderness.   Musculoskeletal:      Right lower leg: Edema present.      Left lower leg: Edema present.      Comments: Bilateral LE wrapped, s/p L TMA   Skin:     General: Skin is warm and dry.   Neurological:      Mental Status: He is alert and oriented to person, place, and time.   Psychiatric:         Behavior: Behavior normal.           Significant Labs: All pertinent labs within the  past 24 hours have been reviewed.    Significant Imaging: I have reviewed all pertinent imaging results/findings within the past 24 hours.

## 2023-05-20 NOTE — PLAN OF CARE
Problem: Occupational Therapy  Goal: Occupational Therapy Goal  Description: Goals to be met by: 06/20/23     Patient will increase functional independence with ADLs by performing:    LE Dressing with Minimal Assistance.  Grooming while seated at sink with Modified [place].  Toileting from bedside commode with Minimal Assistance for hygiene and clothing management.   Toilet transfer to bedside commode with Stand-by Assistance and maintaining weight-bearing precaution(s).  Upper extremity exercise program 3x20 reps per handout, with supervision to improve func mobility skills.    Outcome: Ongoing, Progressing     Pt was agreeable to and participated in OT/PT evaluation.  Pt reports that he was mod I with func mobiity and ADLs at PLOF.  During evaluation, pt noted to perform func mobility with SBA-mod A and ADLS with set up - max A.  Pt noted with WB precautions (NWB on LLE and RLE partial WB on heel only with Darco shoe for transfers only).  Pt noted with difficulty following WB limitations during transfers from bed <-> w/c regardless of verbal cues.  Goals established to assist pt with returning to [facility] regarding ADLs and func mobility.  Pt will benefit from skilled OT services in order to increase his level of safety and independence with ADLs and mobility.      Racquel Wheeler, OT  5/20/2023

## 2023-05-20 NOTE — PLAN OF CARE
Problem: Physical Therapy  Goal: Physical Therapy Goal  Description: Goals to be met by: 23     Patient will increase functional independence with mobility by performin. Supine to sit with Modified [place]  2. Sit to supine with Modified [place]  3. Bed to chair transfer with Supervision  squat pivot or using sliding board   4. Wheelchair propulsion x 100 feet with Modified [place] using bilateral uppper extremities    Outcome: Ongoing, Progressing   PT evaluation was completed. Pt required CG/SBA with rolling L/R and supine<>sit needing cues for proper technique and safety. Pt was educated in WB precautions: L LE NWB and R LE PWB ( R heel only) in Darco shoe. Pt  required MODA x 1 for bed<>w/c scoot/ squat pivot transfer needing assist /cues for adherence B LE WB status. Pt required increased time to perform functional tasks. Pt was resistant to follow B LE WB precautions and therapist's instructions for  proper technique/safety with bed<>w/c transfer. Recommend LTAC  upon d/c  to address wound care and PT training to address functional mobility deficits.

## 2023-05-20 NOTE — PLAN OF CARE
Problem: Adult Inpatient Plan of Care  Goal: Plan of Care Review  Outcome: Ongoing, Progressing  Goal: Patient-Specific Goal (Individualized)  Outcome: Ongoing, Progressing  Goal: Absence of Hospital-Acquired Illness or Injury  Outcome: Ongoing, Progressing  Goal: Optimal Comfort and Wellbeing  Outcome: Ongoing, Progressing     Problem: Diabetes Comorbidity  Goal: Blood Glucose Level Within Targeted Range  Outcome: Ongoing, Progressing     Problem: Oral Intake Inadequate (Acute Kidney Injury/Impairment)  Goal: Optimal Nutrition Intake  Outcome: Ongoing, Progressing     Problem: Impaired Wound Healing  Goal: Optimal Wound Healing  Outcome: Ongoing, Progressing     Problem: Fall Injury Risk  Goal: Absence of Fall and Fall-Related Injury  Outcome: Ongoing, Progressing

## 2023-05-20 NOTE — PT/OT/SLP EVAL
Occupational Therapy   Evaluation    Name: Saji Castañeda  MRN: [id number]  Admitting Diagnosis: Chronic osteomyelitis  Recent Surgery: * No surgery found *      Recommendations:     Discharge Recommendations: LTACH (long-term acute care hospital)  Discharge Equipment Recommendations:  slide board, hospital bed (wheelchair cushion)  Barriers to discharge:  Inaccessible home environment, Decreased caregiver support    Assessment:     Saji Castañeda is a 73 y.o. male with a medical diagnosis of Chronic osteomyelitis.  He presents with the following performance deficits affecting function: weakness, impaired endurance, impaired self care skills, impaired sensation, impaired functional mobility, gait instability, impaired cognition, decreased lower extremity function, decreased safety awareness, pain, decreased ROM, impaired skin, edema, orthopedic precautions.  Pt was agreeable to and participated in OT/PT evaluation.  Pt reports that he was mod I with func mobiity and ADLs at [facility].  During evaluation, pt noted to perform func mobility with SBA-mod A and ADLS with set up - max A.  Pt noted with WB precautions (NWB on LLE and RLE partial WB on heel only with Darco shoe for transfers only).  Pt noted with difficulty following WB limitations during transfers from bed <-> w/c regardless of verbal cues.  Goals established to assist pt with returning to [facility] regarding ADLs and func mobility.  Pt will benefit from skilled OT services in order to increase his level of safety and independence with ADLs and mobility.      Rehab Prognosis: Fair; patient would benefit from acute skilled OT services to address these deficits and reach maximum level of function.       Plan:     Patient to be seen 3 x/week to address the above listed problems via self-care/home management, therapeutic activities, therapeutic exercises  Plan of Care Expires: 06/19/23  Plan of Care Reviewed with: patient    Subjective     Chief Complaint: no c/o pain at  "the time, but has hx of pain from LE wounds  Patient/Family Comments/goals: none stated    Occupational Profile:  Living Environment: pt lives with his brother and sister in [facility] - has 3 LON with BHR to enter - has t/s combo for bathing but takes sponge baths at sink while seated in w/c  Previous level of function: per pt, he was mod I with ADLS and func mobility - however, not following WB precautions stating, "I do what I have to do"    Equipment Used at Home: wheelchair, walker, standard - states he does not use SW  Assistance upon Discharge: limited    Pain/Comfort:  Pain Rating 1: 0/10 (pt did not c/o pain)  Pain Rating Post-Intervention 1: 0/10    Patients cultural, spiritual, Advent conflicts given the current situation: no    Objective:     Communicated with: nurse prior to session.  Patient found HOB elevated with bed alarm, PureWick, telemetry, pressure relief boots upon OT entry to room.    General Precautions: Standard, fall  Orthopedic Precautions: RLE partial weight bearing, LLE non weight bearing (WB on R heel only with Darco shoe on for transfers ONLY)  Braces:  (R darco shoe)  Respiratory Status: Room air    Occupational Performance:    Bed Mobility:    Patient completed Rolling/Turning to Left with  stand by assistance  Patient completed Rolling/Turning to Right with stand by assistance and with side rail  Patient completed Scooting/Bridging with stand by assistance  Patient completed Supine to Sit with stand by assistance and with side rail  Patient completed Sit to Supine with stand by assistance and with side rail    Functional Mobility/Transfers:  Patient completed Bed <> Chair Transfer using Squat Pivot technique with assistance with no assistive device  Functional Mobility: Pt reminded of WB precautions for LEs prior to transfer in/out of bed - PT provided physical assistance on LLE, however, pt asked her to stop and let him do it - on transfer back to bed, pt performed transfer " with mod A but needed A with relieving weight on LLE - refer to PT note for further explanation    Activities of Daily Living:  Grooming: set up A sitting EOB  Bathing: minimum assistance sponge bath for most of body while seated EOB - needed A to wash buttocks  Upper Body Dressing: set up A  sitting EOB  Lower Body Dressing: total assistance with Darco and pressure relief boots  Toileting: moderate assistance - pt using purewick and asked for purewick to be placed on again at end of session  - able to wipe front perineal but needed A to wipe buttocks and hiren/doff briefs    Cognitive/Visual Perceptual:  Cognitive/Psychosocial Skills:     -       Oriented to: Person, Place, and Situation   -       Follows Commands/attention:Easily distracted and Follows one-step commands  -       Communication: clear/fluent  -       Memory: No Deficits noted  -       Safety awareness/insight to disability: impaired   -       Mood/Affect/Coping skills/emotional control: Appropriate to situation    Physical Exam:  Balance: -       sitting = good:  standing = NT due to WB precautions  Postural examination/scapula alignment:    -       Rounded shoulders  -       Forward head  -       Posterior pelvic tilt  Skin integrity: multiple wounds on B LEs/feet - B lower legs bandaged  Edema:  Moderate B Les  Sensation: -       Impaired  feet  Upper Extremity Range of Motion:   BUEs = WFL (except for shoulders, but still functional for ADLs and transfers)  Upper Extremity Strength:  BUEs = WFL   Strength:  BUEs = WFL    AMPAC 6 Click ADL:  AMPAC Total Score: 17    Treatment & Education:  Pt completed ADLs and func mobility activities for tx session as noted above  Pt reminded of WB precautions - performing transfers with some weight on LEs regardless of assistance and cues  Pt educated on role of OT and POC      Patient left HOB elevated with all lines intact, call button in reach, bed alarm on, and PCA notified to assist with replacing  cherri    GOALS:   Multidisciplinary Problems       Occupational Therapy Goals          Problem: Occupational Therapy    Goal Priority Disciplines Outcome Interventions   Occupational Therapy Goal     OT, PT/OT Ongoing, Progressing    Description: Goals to be met by: 06/20/23     Patient will increase functional independence with ADLs by performing:    LE Dressing with Minimal Assistance.  Grooming while seated at sink with Modified [place].  Toileting from bedside commode with Minimal Assistance for hygiene and clothing management.   Toilet transfer to bedside commode with Stand-by Assistance and maintaining weight-bearing precaution(s).  Upper extremity exercise program 3x20 reps per handout, with supervision to improve func mobility skills.                         History:     Past Medical History:   Diagnosis Date    Arthritis     legs    Diabetes mellitus     Diabetes mellitus, type 2     Hyperlipidemia     Hypertension     Osteomyelitis          Past Surgical History:   Procedure Laterality Date    ANGIOGRAPHY OF LOWER EXTREMITY N/A 2/3/2021    Procedure: Angiogram Extremity Bilateral;  Surgeon: Ernst Chacko MD;  Location: [facility] OR [address] [place] [address];  Service: Peripheral Vascular;  Laterality: N/A;  7.4 mintues fluoroscopy time  816.15 mGy  170.17 Gycm2    AORTOGRAPHY WITH EXTREMITY RUNOFF Bilateral 2/3/2021    Procedure: AORTOGRAM, WITH EXTREMITY RUNOFF;  Surgeon: Ernst Chacko MD;  Location: [facility] OR [address] [place] [address];  Service: Peripheral Vascular;  Laterality: Bilateral;    DEBRIDEMENT OF FOOT Left 2/23/2021    Procedure: DEBRIDEMENT, LEFT HEEL;  Surgeon: Mayra Schroeder DPM;  Location: [facility] OR [address] [place] [address];  Service: Podiatry;  Laterality: Left;    FOOT AMPUTATION  October 2010    left high midfoot amputation       Time Tracking:     OT Date of Treatment: 05/20/23  OT Start Time: 0949  OT Stop Time: 1027  OT Total Time (min): 38 min - coeval with PT    Billable Minutes:Evaluation 15  Self Care/Home  Management 23    5/20/2023

## 2023-05-20 NOTE — ASSESSMENT & PLAN NOTE
Patient's FSGs are controlled on current medication regimen.  Last A1c reviewed-   Lab Results   Component Value Date    HGBA1C 8.4 (H) 03/10/2023     Most recent fingerstick glucose reviewed-   Recent Labs   Lab 05/19/23  1602 05/19/23 2011 05/20/23  0515 05/20/23  1207   POCTGLUCOSE 144* 98 112* 288*     Current correctional scale  Medium  Decrease anti-hyperglycemic dose as follows-   Antihyperglycemics (From admission, onward)    Start     Stop Route Frequency Ordered    05/20/23 1130  insulin aspart U-100 pen 12 Units         -- SubQ 3 times daily with meals 05/20/23 0805    05/20/23 0900  insulin detemir U-100 (Levemir) pen 18 Units         -- SubQ 2 times daily 05/20/23 0805    05/19/23 1138  insulin aspart U-100 pen 1-10 Units         -- SubQ Before meals & nightly PRN 05/19/23 1140        Hold Oral hypoglycemics while patient is in the hospital.

## 2023-05-20 NOTE — NURSING
Wound care performed to bilateral lower extremities per wound care orders. Pt premedicated per MAR, tolerated well. Zflex boots applied to bilateral feet. Bed in lowest position, locked and bed alarms set. Pt instructed to call when need, verbalized understanding. Call bell within pt's reach.

## 2023-05-20 NOTE — NURSING
Pt on continuous cardiac monitoring. Medication administered per MAR. Pt safety maintained. Bed in lowest position, locked and bed alarms on. Pt instructed to call when need, verbalized understanding. Call bell within pt's reach.

## 2023-05-21 LAB
POCT GLUCOSE: 102 MG/DL (ref 70–110)
POCT GLUCOSE: 105 MG/DL (ref 70–110)
POCT GLUCOSE: 179 MG/DL (ref 70–110)
POCT GLUCOSE: 187 MG/DL (ref 70–110)

## 2023-05-21 PROCEDURE — 25000003 PHARM REV CODE 250: Performed by: STUDENT IN AN ORGANIZED HEALTH CARE EDUCATION/TRAINING PROGRAM

## 2023-05-21 PROCEDURE — 21400001 HC TELEMETRY ROOM

## 2023-05-21 PROCEDURE — 63600175 PHARM REV CODE 636 W HCPCS: Performed by: STUDENT IN AN ORGANIZED HEALTH CARE EDUCATION/TRAINING PROGRAM

## 2023-05-21 RX ADMIN — ISOSORBIDE DINITRATE 10 MG: 10 TABLET ORAL at 09:05

## 2023-05-21 RX ADMIN — COLLAGENASE SANTYL: 250 OINTMENT TOPICAL at 09:05

## 2023-05-21 RX ADMIN — APIXABAN 5 MG: 5 TABLET, FILM COATED ORAL at 10:05

## 2023-05-21 RX ADMIN — CLOPIDOGREL BISULFATE 75 MG: 75 TABLET ORAL at 09:05

## 2023-05-21 RX ADMIN — NIFEDIPINE 60 MG: 60 TABLET, FILM COATED, EXTENDED RELEASE ORAL at 09:05

## 2023-05-21 RX ADMIN — CEFTRIAXONE 1 G: 1 INJECTION, POWDER, FOR SOLUTION INTRAMUSCULAR; INTRAVENOUS at 12:05

## 2023-05-21 RX ADMIN — ISOSORBIDE DINITRATE 10 MG: 10 TABLET ORAL at 10:05

## 2023-05-21 RX ADMIN — Medication 6 MG: at 10:05

## 2023-05-21 RX ADMIN — PANTOPRAZOLE SODIUM 40 MG: 40 TABLET, DELAYED RELEASE ORAL at 09:05

## 2023-05-21 RX ADMIN — TAMSULOSIN HYDROCHLORIDE 0.4 MG: 0.4 CAPSULE ORAL at 09:05

## 2023-05-21 RX ADMIN — INSULIN ASPART 2 UNITS: 100 INJECTION, SOLUTION INTRAVENOUS; SUBCUTANEOUS at 06:05

## 2023-05-21 RX ADMIN — APIXABAN 5 MG: 5 TABLET, FILM COATED ORAL at 09:05

## 2023-05-21 RX ADMIN — ISOSORBIDE DINITRATE 10 MG: 10 TABLET ORAL at 02:05

## 2023-05-21 RX ADMIN — INSULIN ASPART 12 UNITS: 100 INJECTION, SOLUTION INTRAVENOUS; SUBCUTANEOUS at 12:05

## 2023-05-21 RX ADMIN — INSULIN ASPART 12 UNITS: 100 INJECTION, SOLUTION INTRAVENOUS; SUBCUTANEOUS at 05:05

## 2023-05-21 RX ADMIN — INSULIN ASPART 12 UNITS: 100 INJECTION, SOLUTION INTRAVENOUS; SUBCUTANEOUS at 09:05

## 2023-05-21 RX ADMIN — LOSARTAN POTASSIUM 100 MG: 50 TABLET, FILM COATED ORAL at 09:05

## 2023-05-21 RX ADMIN — OXYCODONE HYDROCHLORIDE 5 MG: 5 TABLET ORAL at 10:05

## 2023-05-21 RX ADMIN — INSULIN ASPART 2 UNITS: 100 INJECTION, SOLUTION INTRAVENOUS; SUBCUTANEOUS at 12:05

## 2023-05-21 RX ADMIN — ATORVASTATIN CALCIUM 80 MG: 40 TABLET, FILM COATED ORAL at 09:05

## 2023-05-21 NOTE — SUBJECTIVE & OBJECTIVE
Interval History:  remains bradycardic. Asymptomatic. EKG with ?AV dissociation. No new complaints.     Review of Systems   Respiratory:  Negative for shortness of breath.    Gastrointestinal:  Negative for abdominal pain and nausea.   Objective:     Vital Signs (Most Recent):  Temp: 98.6 °F (37 °C) (05/21/23 1133)  Pulse: (!) 44 (05/21/23 1150)  Resp: 18 (05/21/23 1133)  BP: 123/61 (05/21/23 1133)  SpO2: 96 % (05/21/23 1133) Vital Signs (24h Range):  Temp:  [98.4 °F (36.9 °C)-98.8 °F (37.1 °C)] 98.6 °F (37 °C)  Pulse:  [40-72] 44  Resp:  [16-18] 18  SpO2:  [94 %-96 %] 96 %  BP: (108-159)/(51-71) 123/61     Weight: 112.3 kg (247 lb 9.2 oz)  Body mass index is 36.56 kg/m².    Intake/Output Summary (Last 24 hours) at 5/21/2023 1425  Last data filed at 5/21/2023 1200  Gross per 24 hour   Intake 167.74 ml   Output 500 ml   Net -332.26 ml           Physical Exam  Vitals and nursing note reviewed.   Constitutional:       General: He is not in acute distress.     Appearance: He is well-developed.   HENT:      Mouth/Throat:      Mouth: Mucous membranes are moist.   Eyes:      General: No scleral icterus.  Neck:      Trachea: No tracheal deviation.   Cardiovascular:      Rate and Rhythm: Regular rhythm. Bradycardia present.      Heart sounds: Normal heart sounds. No murmur heard.  Pulmonary:      Effort: Pulmonary effort is normal. No respiratory distress.      Breath sounds: Normal breath sounds. No wheezing.   Abdominal:      General: Bowel sounds are normal. There is no distension.      Palpations: Abdomen is soft.      Tenderness: There is no abdominal tenderness.   Musculoskeletal:      Right lower leg: Edema present.      Left lower leg: Edema present.      Comments: Bilateral LE wrapped, s/p L TMA   Skin:     General: Skin is warm and dry.   Neurological:      Mental Status: He is alert and oriented to person, place, and time.   Psychiatric:         Behavior: Behavior normal.           Significant Labs: All pertinent  labs within the past 24 hours have been reviewed.    Significant Imaging: I have reviewed all pertinent imaging results/findings within the past 24 hours.

## 2023-05-21 NOTE — PROGRESS NOTES
[facility] Medicine  Progress Note    Patient Name: Saji Castañeda  MRN: [id number]  Patient Class: IP- Inpatient   Admission Date: 5/19/2023  Length of Stay: 2 days  Attending Physician: Anna Mcdonnell MD  Primary Care Provider: [facility]        Subjective:     Principal Problem:Chronic osteomyelitis    HPI:  73-year-old male with PMH of bilateral lower extremity chronic osteomyelitis s/p left foot metatarsal amputation, hypertension, CKD, RUE DVT on anticoagulation presented with worsening bilateral lower extremity wounds at [facility].  Imaging concerning for osteo.  Patient was seen by Podiatry, vascular surgery and Infectious Disease.  Vascular surgery recommended AKA given nonhealing wounds but patient declined.  Superficial wound cultures positive.  Blood cultures negative.  Infectious Disease recommended against antibiotics as compromised blood flow to lower extremities in setting of PAD would affect antibiotic delivery.  Urine cultures 5/15 grew Klebsiella pneumoniae for which she was started on ceftriaxone.  Hyperglycemic for which he was placed on IV insulin, since transitioned to basal/bolus insulin.  Per report, case was discussed with interventional Cardiology Dr. Barlow who agreed to evaluate for possible revascularization and recommended transfer to Hospital Medicine.    Patient had breakfast this morning and is currently on Eliquis for DVT.  He denies any new complaints during my encounter.  Vital signs stable.  He was transferred to ICU per transfer protocol.  Hemodynamically stable with no ICU needs.  Transfer orders to [facility]/tele placed.      Overview/Hospital Course:  No notes on file    Interval History:  remains bradycardic. Asymptomatic. EKG with ?AV dissociation. No new complaints.     Review of Systems   Respiratory:  Negative for shortness of breath.    Gastrointestinal:  Negative for abdominal pain and nausea.   Objective:     Vital Signs (Most  Recent):  Temp: 98.6 °F (37 °C) (05/21/23 1133)  Pulse: (!) 44 (05/21/23 1150)  Resp: 18 (05/21/23 1133)  BP: 123/61 (05/21/23 1133)  SpO2: 96 % (05/21/23 1133) Vital Signs (24h Range):  Temp:  [98.4 °F (36.9 °C)-98.8 °F (37.1 °C)] 98.6 °F (37 °C)  Pulse:  [40-72] 44  Resp:  [16-18] 18  SpO2:  [94 %-96 %] 96 %  BP: (108-159)/(51-71) 123/61     Weight: 112.3 kg (247 lb 9.2 oz)  Body mass index is 36.56 kg/m².    Intake/Output Summary (Last 24 hours) at 5/21/2023 1425  Last data filed at 5/21/2023 1200  Gross per 24 hour   Intake 167.74 ml   Output 500 ml   Net -332.26 ml           Physical Exam  Vitals and nursing note reviewed.   Constitutional:       General: He is not in acute distress.     Appearance: He is well-developed.   HENT:      Mouth/Throat:      Mouth: Mucous membranes are moist.   Eyes:      General: No scleral icterus.  Neck:      Trachea: No tracheal deviation.   Cardiovascular:      Rate and Rhythm: Regular rhythm. Bradycardia present.      Heart sounds: Normal heart sounds. No murmur heard.  Pulmonary:      Effort: Pulmonary effort is normal. No respiratory distress.      Breath sounds: Normal breath sounds. No wheezing.   Abdominal:      General: Bowel sounds are normal. There is no distension.      Palpations: Abdomen is soft.      Tenderness: There is no abdominal tenderness.   Musculoskeletal:      Right lower leg: Edema present.      Left lower leg: Edema present.      Comments: Bilateral LE wrapped, s/p L TMA   Skin:     General: Skin is warm and dry.   Neurological:      Mental Status: He is alert and oriented to person, place, and time.   Psychiatric:         Behavior: Behavior normal.           Significant Labs: All pertinent labs within the past 24 hours have been reviewed.    Significant Imaging: I have reviewed all pertinent imaging results/findings within the past 24 hours.      Assessment/Plan:      * Chronic osteomyelitis  H/o chronic osteomyelitis s/p L TMA with severe underlying  PAD  Vascular surgery at [facility] recommends bilateral AKA but patient adamantly declines     Cardiology consult to evaluate revascularization candidacy.   Currently on eliquis, if determined a candidate for revascularization- will need to switch to heparin or lovenox     Not on antibiotics per ID recommendations at [facility] given poor blood flow. Clinically stable at this time.    Wound care consult. Will place orders based on [facility] recommendations.         UTI (urinary tract infection)  Urine culture 5/15 growing Klebseila    On ceftriaxone to complete 7 days       Goals of care, counseling/discussion  Advance Care Planning     Code Status  In light of the patient's medical co-morbidities, I engaged the patient in a conversation about the patient's preferences for care at the very end of life. The patient wishes to have aggressive measures including CPR, intubation. I communicated to the patient that a full code order will be placed in the chart.  I spent a total of 5 minutes engaging the patient in this advance care planning discussion.    Does not have significant other or children. Lives with siblings and would like his siblings specially the sister younger to him Meliza to make medical decisions for him if he is unable to.          Chronic deep vein thrombosis (DVT) of right upper extremity  On eliquis      PVD (peripheral vascular disease)  As above      Asymptomatic Mobitz (type) I (Wenckebach's) atrioventricular block  Bradycardic with intermittent AV dissociation. Asymptomatic. BP stable  Not on beta blocker. Avoid AV tj blocking agent  Telemetry     Cardiology consult      Insulin dependent type 2 diabetes mellitus  Patient's FSGs are controlled on current medication regimen.  Last A1c reviewed-   Lab Results   Component Value Date    HGBA1C 8.4 (H) 03/10/2023     Most recent fingerstick glucose reviewed-   Recent Labs   Lab 05/19/23  1602 05/19/23 2011 05/20/23  0515 05/20/23  1207    POCTGLUCOSE 144* 98 112* 288*     Current correctional scale  Medium  Decrease anti-hyperglycemic dose as follows-   Antihyperglycemics (From admission, onward)    Start     Stop Route Frequency Ordered    05/20/23 1130  insulin aspart U-100 pen 12 Units         -- SubQ 3 times daily with meals 05/20/23 0805    05/20/23 0900  insulin detemir U-100 (Levemir) pen 18 Units         -- SubQ 2 times daily 05/20/23 0805    05/19/23 1138  insulin aspart U-100 pen 1-10 Units         -- SubQ Before meals & nightly PRN 05/19/23 1140        Hold Oral hypoglycemics while patient is in the hospital.    Essential hypertension  Stable  Continue home meds      VTE Risk Mitigation (From admission, onward)         Ordered     apixaban tablet 5 mg  2 times daily         05/19/23 1140                Discharge Planning   OXANA:      Code Status: Full Code   Is the patient medically ready for discharge?:     Reason for patient still in hospital (select all that apply): Consult recommendations           Anna Mcdonnell MD  Department of Hospital Medicine   [facility]

## 2023-05-21 NOTE — PLAN OF CARE
Pt is AAOX4. Scheduled meds were given per MAR. No C/O pain or N/V. Family is not present at the bedside. Bed in lowest position, bed alarm is set. Call light within reach.   Problem: Adult Inpatient Plan of Care  Goal: Absence of Hospital-Acquired Illness or Injury  Outcome: Ongoing, Progressing  Goal: Optimal Comfort and Wellbeing  Outcome: Ongoing, Progressing  Goal: Readiness for Transition of Care  Outcome: Ongoing, Progressing     Problem: Diabetes Comorbidity  Goal: Blood Glucose Level Within Targeted Range  Outcome: Ongoing, Progressing     Problem: Fluid and Electrolyte Imbalance (Acute Kidney Injury/Impairment)  Goal: Fluid and Electrolyte Balance  Outcome: Ongoing, Progressing

## 2023-05-21 NOTE — ASSESSMENT & PLAN NOTE
Bradycardic with intermittent AV dissociation. Asymptomatic. BP stable  Not on beta blocker. Avoid AV tj blocking agent  Telemetry     Cardiology consult

## 2023-05-22 LAB
ANION GAP SERPL CALC-SCNC: 10 MMOL/L (ref 8–16)
BACTERIA SPEC ANAEROBE CULT: NORMAL
BASOPHILS # BLD AUTO: 0.03 K/UL (ref 0–0.2)
BASOPHILS NFR BLD: 0.4 % (ref 0–1.9)
BUN SERPL-MCNC: 34 MG/DL (ref 8–23)
CALCIUM SERPL-MCNC: 8.9 MG/DL (ref 8.7–10.5)
CHLORIDE SERPL-SCNC: 102 MMOL/L (ref 95–110)
CO2 SERPL-SCNC: 26 MMOL/L (ref 23–29)
CREAT SERPL-MCNC: 1.4 MG/DL (ref 0.5–1.4)
DIFFERENTIAL METHOD: ABNORMAL
EOSINOPHIL # BLD AUTO: 0.4 K/UL (ref 0–0.5)
EOSINOPHIL NFR BLD: 5.6 % (ref 0–8)
ERYTHROCYTE [DISTWIDTH] IN BLOOD BY AUTOMATED COUNT: 17 % (ref 11.5–14.5)
EST. GFR  (NO RACE VARIABLE): 53 ML/MIN/1.73 M^2
GLUCOSE SERPL-MCNC: 121 MG/DL (ref 70–110)
HCT VFR BLD AUTO: 25.7 % (ref 40–54)
HGB BLD-MCNC: 8.1 G/DL (ref 14–18)
IMM GRANULOCYTES # BLD AUTO: 0.03 K/UL (ref 0–0.04)
IMM GRANULOCYTES NFR BLD AUTO: 0.4 % (ref 0–0.5)
LYMPHOCYTES # BLD AUTO: 1.4 K/UL (ref 1–4.8)
LYMPHOCYTES NFR BLD: 20.5 % (ref 18–48)
MCH RBC QN AUTO: 26.8 PG (ref 27–31)
MCHC RBC AUTO-ENTMCNC: 31.5 G/DL (ref 32–36)
MCV RBC AUTO: 85 FL (ref 82–98)
MONOCYTES # BLD AUTO: 0.6 K/UL (ref 0.3–1)
MONOCYTES NFR BLD: 9.1 % (ref 4–15)
NEUTROPHILS # BLD AUTO: 4.4 K/UL (ref 1.8–7.7)
NEUTROPHILS NFR BLD: 64 % (ref 38–73)
NRBC BLD-RTO: 0 /100 WBC
PLATELET # BLD AUTO: 330 K/UL (ref 150–450)
PMV BLD AUTO: 9.4 FL (ref 9.2–12.9)
POCT GLUCOSE: 127 MG/DL (ref 70–110)
POCT GLUCOSE: 128 MG/DL (ref 70–110)
POCT GLUCOSE: 221 MG/DL (ref 70–110)
POCT GLUCOSE: 266 MG/DL (ref 70–110)
POTASSIUM SERPL-SCNC: 4.2 MMOL/L (ref 3.5–5.1)
RBC # BLD AUTO: 3.02 M/UL (ref 4.6–6.2)
SODIUM SERPL-SCNC: 138 MMOL/L (ref 136–145)
WBC # BLD AUTO: 6.84 K/UL (ref 3.9–12.7)

## 2023-05-22 PROCEDURE — 93010 ELECTROCARDIOGRAM REPORT: CPT | Mod: ,,, | Performed by: INTERNAL MEDICINE

## 2023-05-22 PROCEDURE — 93005 ELECTROCARDIOGRAM TRACING: CPT

## 2023-05-22 PROCEDURE — 63600175 PHARM REV CODE 636 W HCPCS: Performed by: STUDENT IN AN ORGANIZED HEALTH CARE EDUCATION/TRAINING PROGRAM

## 2023-05-22 PROCEDURE — 36415 COLL VENOUS BLD VENIPUNCTURE: CPT | Performed by: STUDENT IN AN ORGANIZED HEALTH CARE EDUCATION/TRAINING PROGRAM

## 2023-05-22 PROCEDURE — 27000207 HC ISOLATION

## 2023-05-22 PROCEDURE — 93010 EKG 12-LEAD: ICD-10-PCS | Mod: ,,, | Performed by: INTERNAL MEDICINE

## 2023-05-22 PROCEDURE — 80048 BASIC METABOLIC PNL TOTAL CA: CPT | Performed by: STUDENT IN AN ORGANIZED HEALTH CARE EDUCATION/TRAINING PROGRAM

## 2023-05-22 PROCEDURE — 25000003 PHARM REV CODE 250: Performed by: STUDENT IN AN ORGANIZED HEALTH CARE EDUCATION/TRAINING PROGRAM

## 2023-05-22 PROCEDURE — 85025 COMPLETE CBC W/AUTO DIFF WBC: CPT | Performed by: STUDENT IN AN ORGANIZED HEALTH CARE EDUCATION/TRAINING PROGRAM

## 2023-05-22 PROCEDURE — 21400001 HC TELEMETRY ROOM

## 2023-05-22 PROCEDURE — 99900035 HC TECH TIME PER 15 MIN (STAT)

## 2023-05-22 RX ORDER — AMOXICILLIN 250 MG
1 CAPSULE ORAL 2 TIMES DAILY
Status: DISCONTINUED | OUTPATIENT
Start: 2023-05-22 | End: 2023-05-25 | Stop reason: HOSPADM

## 2023-05-22 RX ORDER — POLYETHYLENE GLYCOL 3350 17 G/17G
17 POWDER, FOR SOLUTION ORAL DAILY
Status: DISCONTINUED | OUTPATIENT
Start: 2023-05-22 | End: 2023-05-25 | Stop reason: HOSPADM

## 2023-05-22 RX ADMIN — APIXABAN 5 MG: 5 TABLET, FILM COATED ORAL at 08:05

## 2023-05-22 RX ADMIN — NIFEDIPINE 60 MG: 60 TABLET, FILM COATED, EXTENDED RELEASE ORAL at 08:05

## 2023-05-22 RX ADMIN — ISOSORBIDE DINITRATE 10 MG: 10 TABLET ORAL at 08:05

## 2023-05-22 RX ADMIN — INSULIN ASPART 12 UNITS: 100 INJECTION, SOLUTION INTRAVENOUS; SUBCUTANEOUS at 08:05

## 2023-05-22 RX ADMIN — CEFTRIAXONE 1 G: 1 INJECTION, POWDER, FOR SOLUTION INTRAMUSCULAR; INTRAVENOUS at 12:05

## 2023-05-22 RX ADMIN — POLYETHYLENE GLYCOL 3350 17 G: 17 POWDER, FOR SOLUTION ORAL at 01:05

## 2023-05-22 RX ADMIN — OXYCODONE HYDROCHLORIDE 5 MG: 5 TABLET ORAL at 08:05

## 2023-05-22 RX ADMIN — LOSARTAN POTASSIUM 100 MG: 50 TABLET, FILM COATED ORAL at 08:05

## 2023-05-22 RX ADMIN — TAMSULOSIN HYDROCHLORIDE 0.4 MG: 0.4 CAPSULE ORAL at 08:05

## 2023-05-22 RX ADMIN — Medication 6 MG: at 08:05

## 2023-05-22 RX ADMIN — PANTOPRAZOLE SODIUM 40 MG: 40 TABLET, DELAYED RELEASE ORAL at 08:05

## 2023-05-22 RX ADMIN — INSULIN ASPART 12 UNITS: 100 INJECTION, SOLUTION INTRAVENOUS; SUBCUTANEOUS at 05:05

## 2023-05-22 RX ADMIN — DOCUSATE SODIUM AND SENNOSIDES 1 TABLET: 8.6; 5 TABLET, FILM COATED ORAL at 01:05

## 2023-05-22 RX ADMIN — INSULIN ASPART 3 UNITS: 100 INJECTION, SOLUTION INTRAVENOUS; SUBCUTANEOUS at 08:05

## 2023-05-22 RX ADMIN — COLLAGENASE SANTYL: 250 OINTMENT TOPICAL at 04:05

## 2023-05-22 RX ADMIN — CLOPIDOGREL BISULFATE 75 MG: 75 TABLET ORAL at 08:05

## 2023-05-22 RX ADMIN — ATORVASTATIN CALCIUM 80 MG: 40 TABLET, FILM COATED ORAL at 08:05

## 2023-05-22 RX ADMIN — OXYCODONE HYDROCHLORIDE 5 MG: 5 TABLET ORAL at 03:05

## 2023-05-22 RX ADMIN — INSULIN ASPART 12 UNITS: 100 INJECTION, SOLUTION INTRAVENOUS; SUBCUTANEOUS at 12:05

## 2023-05-22 RX ADMIN — DOCUSATE SODIUM AND SENNOSIDES 1 TABLET: 8.6; 5 TABLET, FILM COATED ORAL at 08:05

## 2023-05-22 RX ADMIN — ISOSORBIDE DINITRATE 10 MG: 10 TABLET ORAL at 03:05

## 2023-05-22 NOTE — PLAN OF CARE
SW met with pt via [employer] to discuss dc planning. Pt expressed that he resides with his sister. Pt expressed upon dc he will return to his sisters home. Pt reports that he is agreeable to any type of rehab placement. Pt expressed that he declines any prison placement. SW expressed understanding. SW will continue to follow pt throughout his transitions of care and assist with any dc needs.     Future Appointments   Date Time Provider Department Center   5/30/2023  2:30 PM [facility]arc[facility]        05/22/23 1547   Post-Acute Status   Post-Acute Authorization Other

## 2023-05-22 NOTE — PLAN OF CARE
SW sent [facility] referrals via [employer].        05/22/23 0753   Post-Acute Status   Post-Acute Authorization Placement   Post-Acute Placement Status Referrals Sent

## 2023-05-22 NOTE — CARE UPDATE
Alerted by RN staff of HR ranges 38-45 on telemetry. EKG with notation of SB with 1st degree AVB. Telemetry reviewed with notation of HR ranges and 1st degree AVB. Some events difficult to fully discern if 2nd degree AVB noted. BP stable and patient asymptomatic. BMP WNL. Not on BB or other AV tj blockers. Outpatient HR 42-72 per Epic. Continue to monitor on telemetry and avoid AV tj blockers

## 2023-05-22 NOTE — PT/OT/SLP PROGRESS
"Occupational Therapy      Patient Name:  Saji Castañeda   MRN:  [id number]    Patient not seen OOB today secondary to Patient unwilling to participate. Upon OT arrival, and explanation of treatment rationales, focus of session, Patient responding x2 "Please put me down for tomorrow." OT will follow-up as indicated and as agreed upon with Patient this date.    5/22/2023  "

## 2023-05-22 NOTE — SUBJECTIVE & OBJECTIVE
Interval History:  remains bradycardic. Asymptomatic. No new complains.     Review of Systems   Respiratory:  Negative for shortness of breath.    Gastrointestinal:  Negative for abdominal pain and nausea.   Objective:     Vital Signs (Most Recent):  Temp: 98 °F (36.7 °C) (05/22/23 1131)  Pulse: (!) 36 (05/22/23 1131)  Resp: 18 (05/22/23 1131)  BP: (!) 145/66 (05/22/23 1131)  SpO2: 98 % (05/22/23 1131) Vital Signs (24h Range):  Temp:  [97.3 °F (36.3 °C)-98.8 °F (37.1 °C)] 98 °F (36.7 °C)  Pulse:  [36-58] 36  Resp:  [17-19] 18  SpO2:  [95 %-98 %] 98 %  BP: (114-180)/(57-73) 145/66     Weight: 113.3 kg (249 lb 12.5 oz)  Body mass index is 36.89 kg/m².    Intake/Output Summary (Last 24 hours) at 5/22/2023 1201  Last data filed at 5/22/2023 0448  Gross per 24 hour   Intake 168.83 ml   Output 650 ml   Net -481.17 ml           Physical Exam  Vitals and nursing note reviewed.   Constitutional:       General: He is not in acute distress.     Appearance: He is well-developed.   HENT:      Mouth/Throat:      Mouth: Mucous membranes are moist.   Eyes:      General: No scleral icterus.  Neck:      Trachea: No tracheal deviation.   Cardiovascular:      Rate and Rhythm: Regular rhythm. Bradycardia present.      Heart sounds: Normal heart sounds. No murmur heard.  Pulmonary:      Effort: Pulmonary effort is normal. No respiratory distress.      Breath sounds: Normal breath sounds. No wheezing.   Abdominal:      General: Bowel sounds are normal. There is no distension.      Palpations: Abdomen is soft.      Tenderness: There is no abdominal tenderness.   Musculoskeletal:      Right lower leg: Edema present.      Left lower leg: Edema present.      Comments: Bilateral LE wrapped, s/p L TMA   Skin:     General: Skin is warm and dry.   Neurological:      Mental Status: He is alert and oriented to person, place, and time.   Psychiatric:         Behavior: Behavior normal.           Significant Labs: All pertinent labs within the past 24  hours have been reviewed.    Significant Imaging: I have reviewed all pertinent imaging results/findings within the past 24 hours.

## 2023-05-22 NOTE — CARE UPDATE
Patient seen briefly on rounds with Dr. Wu. Chart reviewed. Dr. Banks's note reviewed. Dr. Wu to discuss with General Surgery- Dr. Almanzar with further recs to follow. Continue Plavix and statin therapy

## 2023-05-22 NOTE — PT/OT/SLP PROGRESS
Physical Therapy      Patient Name:  Saji Castañeda   MRN:  [id number]    Patient not seen today secondary to  (pt refusal today with increased encouragement). Will follow-up tomorrow.

## 2023-05-22 NOTE — PLAN OF CARE
Pt AAO x4.  VSS.  Pt remained afebrile throughout this shift.    Pt remained free of falls this shift.   Pt c/o of np pain this shift.  Blood glucose monitored, corrected via SSI.  Plan of care reviewed. Patient verbalizes understanding.   Pt moving/turing q 2 hours. Frequent weight shifting encouraged.  Patient SB on monitor.   Bed low, side rails up x 2, wheels locked, call light in reach.   Bed alarm maintained for safety.   Patient instructed to call for assistance.   Hourly rounding completed.   24 hour chart check completed.  Will continue to monitor.

## 2023-05-22 NOTE — PROGRESS NOTES
[facility] Medicine  Progress Note    Patient Name: Saji Castañeda  MRN: [id number]  Patient Class: IP- Inpatient   Admission Date: 5/19/2023  Length of Stay: 3 days  Attending Physician: Anna Mcdonnell MD  Primary Care Provider: [facility]        Subjective:     Principal Problem:Chronic osteomyelitis    HPI:  73-year-old male with PMH of bilateral lower extremity chronic osteomyelitis s/p left foot metatarsal amputation, hypertension, CKD, RUE DVT on anticoagulation presented with worsening bilateral lower extremity wounds at [facility].  Imaging concerning for osteo.  Patient was seen by Podiatry, vascular surgery and Infectious Disease.  Vascular surgery recommended AKA given nonhealing wounds but patient declined.  Superficial wound cultures positive.  Blood cultures negative.  Infectious Disease recommended against antibiotics as compromised blood flow to lower extremities in setting of PAD would affect antibiotic delivery.  Urine cultures 5/15 grew Klebsiella pneumoniae for which she was started on ceftriaxone.  Hyperglycemic for which he was placed on IV insulin, since transitioned to basal/bolus insulin.  Per report, case was discussed with interventional Cardiology Dr. Barlow who agreed to evaluate for possible revascularization and recommended transfer to Hospital Medicine.    Patient had breakfast this morning and is currently on Eliquis for DVT.  He denies any new complaints during my encounter.  Vital signs stable.  He was transferred to ICU per transfer protocol.  Hemodynamically stable with no ICU needs.  Transfer orders to [facility]/tele placed.      Overview/Hospital Course:  No notes on file    Interval History:  remains bradycardic. Asymptomatic. No new complains.     Review of Systems   Respiratory:  Negative for shortness of breath.    Gastrointestinal:  Negative for abdominal pain and nausea.   Objective:     Vital Signs (Most Recent):  Temp: 98 °F (36.7 °C)  (05/22/23 1131)  Pulse: (!) 36 (05/22/23 1131)  Resp: 18 (05/22/23 1131)  BP: (!) 145/66 (05/22/23 1131)  SpO2: 98 % (05/22/23 1131) Vital Signs (24h Range):  Temp:  [97.3 °F (36.3 °C)-98.8 °F (37.1 °C)] 98 °F (36.7 °C)  Pulse:  [36-58] 36  Resp:  [17-19] 18  SpO2:  [95 %-98 %] 98 %  BP: (114-180)/(57-73) 145/66     Weight: 113.3 kg (249 lb 12.5 oz)  Body mass index is 36.89 kg/m².    Intake/Output Summary (Last 24 hours) at 5/22/2023 1201  Last data filed at 5/22/2023 0448  Gross per 24 hour   Intake 168.83 ml   Output 650 ml   Net -481.17 ml           Physical Exam  Vitals and nursing note reviewed.   Constitutional:       General: He is not in acute distress.     Appearance: He is well-developed.   HENT:      Mouth/Throat:      Mouth: Mucous membranes are moist.   Eyes:      General: No scleral icterus.  Neck:      Trachea: No tracheal deviation.   Cardiovascular:      Rate and Rhythm: Regular rhythm. Bradycardia present.      Heart sounds: Normal heart sounds. No murmur heard.  Pulmonary:      Effort: Pulmonary effort is normal. No respiratory distress.      Breath sounds: Normal breath sounds. No wheezing.   Abdominal:      General: Bowel sounds are normal. There is no distension.      Palpations: Abdomen is soft.      Tenderness: There is no abdominal tenderness.   Musculoskeletal:      Right lower leg: Edema present.      Left lower leg: Edema present.      Comments: Bilateral LE wrapped, s/p L TMA   Skin:     General: Skin is warm and dry.   Neurological:      Mental Status: He is alert and oriented to person, place, and time.   Psychiatric:         Behavior: Behavior normal.           Significant Labs: All pertinent labs within the past 24 hours have been reviewed.    Significant Imaging: I have reviewed all pertinent imaging results/findings within the past 24 hours.      Assessment/Plan:      * Chronic osteomyelitis  H/o chronic osteomyelitis s/p L TMA with severe underlying PAD  Vascular surgery at [facility]  Index recommends bilateral AKA but patient adamantly declines     Cardiology consult to evaluate revascularization candidacy.   Currently on eliquis, if determined a candidate for revascularization- will need to switch to heparin or lovenox     Not on antibiotics per ID recommendations at [facility] given poor blood flow. Clinically stable at this time.    Wound care consult. Will place orders based on [facility] recommendations.         UTI (urinary tract infection)  Urine culture 5/15 growing Klebseila    On ceftriaxone to complete 7 days       Goals of care, counseling/discussion  Advance Care Planning     Code Status  In light of the patient's medical co-morbidities, I engaged the patient in a conversation about the patient's preferences for care at the very end of life. The patient wishes to have aggressive measures including CPR, intubation. I communicated to the patient that a full code order will be placed in the chart.  I spent a total of 5 minutes engaging the patient in this advance care planning discussion.    Does not have significant other or children. Lives with siblings and would like his siblings specially the sister younger to him Meliza to make medical decisions for him if he is unable to.          Chronic deep vein thrombosis (DVT) of right upper extremity  On eliquis      PVD (peripheral vascular disease)  As above      Asymptomatic Mobitz (type) I (Wenckebach's) atrioventricular block  Bradycardic with intermittent AV dissociation. Asymptomatic. BP stable  Not on beta blocker. Avoid AV tj blocking agent  Telemetry     Cardiology consult      Insulin dependent type 2 diabetes mellitus  Patient's FSGs are controlled on current medication regimen.  Last A1c reviewed-   Lab Results   Component Value Date    HGBA1C 8.4 (H) 03/10/2023     Most recent fingerstick glucose reviewed-   Recent Labs   Lab 05/19/23  1602 05/19/23 2011 05/20/23  0515 05/20/23  1207   POCTGLUCOSE 144* 98 112* 288*      Current correctional scale  Medium  Decrease anti-hyperglycemic dose as follows-   Antihyperglycemics (From admission, onward)    Start     Stop Route Frequency Ordered    05/20/23 1130  insulin aspart U-100 pen 12 Units         -- SubQ 3 times daily with meals 05/20/23 0805    05/20/23 0900  insulin detemir U-100 (Levemir) pen 18 Units         -- SubQ 2 times daily 05/20/23 0805    05/19/23 1138  insulin aspart U-100 pen 1-10 Units         -- SubQ Before meals & nightly PRN 05/19/23 1140        Hold Oral hypoglycemics while patient is in the hospital.    Essential hypertension  Stable  Continue home meds        VTE Risk Mitigation (From admission, onward)         Ordered     apixaban tablet 5 mg  2 times daily         05/19/23 1140                Discharge Planning   OXANA:      Code Status: Full Code   Is the patient medically ready for discharge?:     Reason for patient still in hospital (select all that apply): Patient trending condition and Consult recommendations           Anna Mcdonnell MD  Department of Hospital Medicine   [facility] S[facility]

## 2023-05-22 NOTE — PROGRESS NOTES
05/22/23 1020   Patient Request   Patient Requested None   Nurse Notification   Charge Nurse Notified? No   Bedside Nurse Notified? Yes   Name of Bedside Nurse Arun Vargas   Nurse Notfication Method Secure Chat   Nurse Notified Of Other  (Patient heart rate 35)   Provider Notification   Provider Notified? Yes   Name of Provider Linda Mckeon NP   Provider Notification Method Secure Chat   Provider Notified Of Other (See Comment)  (Heart rate 35)    Notification    Notified? No   Shift   Pain Management Interventions pillow support provided   Virtual Nurse - Patient Verbalized Approval Of Camera Use;VN Rounding   Type of Frequent Check   Type Other (see comments)   Significant Events This Shift   Significant Events Other (comment)   Virtual Nurse - Description of Significant Event Heart rate 35   Safety/Activity   Patient Rounds bed in low position;placement of personal items at bedside;visualized patient;clutter free environment maintained   Safety Promotion/Fall Prevention side rails raised x 2   Safety Precautions emergency equipment at bedside   Positioning   Body Position supine   Head of Bed (HOB) Positioning HOB at 45 degrees   Positioning/Transfer Devices pillows   Pain/Comfort/Sleep   Comfort/Acceptable Pain Level 0   Pain Rating: Activity 0 - no pain   Fever Reduction/Comfort Measures lightweight bedding   Sleep/Rest/Relaxation appears asleep;other (see comments)   Cued into patient's room. Patient asleep. Arouses to verbal stimuli. Denies chest pain, shortness of breath. No discomfort. RENETTA Mckeon NP and bedside nurse notified of HR 35. See orders

## 2023-05-22 NOTE — PLAN OF CARE
Patient is AAOx4. Complaints of 9/10 pain in bilateral lower extremities, PRN medications administered. No complaints of N/V. Tele monitored. Glucose monitored. Medications administered per MAR. Wound care and dressing change performed. Safety maintained. Instructed to call for assistance.

## 2023-05-22 NOTE — ASSESSMENT & PLAN NOTE
H/o chronic osteomyelitis s/p L TMA with severe underlying PAD  Vascular surgery at [facility] recommends bilateral AKA but patient adamantly declines     Cardiology consult to evaluate revascularization candidacy.   Currently on eliquis, if determined a candidate for revascularization- will need to switch to heparin or lovenox     Not on antibiotics per ID recommendations at [facility] given poor blood flow. Clinically stable at this time.    Wound care consult. Will place orders based on [facility] recommendations.

## 2023-05-23 ENCOUNTER — PATIENT OUTREACH (OUTPATIENT)
Dept: ADMINISTRATIVE | Facility: OTHER | Age: 73
End: 2023-05-23
Payer: MEDICARE

## 2023-05-23 LAB
BACTERIA SPEC ANAEROBE CULT: NORMAL
POCT GLUCOSE: 151 MG/DL (ref 70–110)
POCT GLUCOSE: 179 MG/DL (ref 70–110)
POCT GLUCOSE: 224 MG/DL (ref 70–110)
POCT GLUCOSE: 230 MG/DL (ref 70–110)
POCT GLUCOSE: 248 MG/DL (ref 70–110)
POCT GLUCOSE: 257 MG/DL (ref 70–110)

## 2023-05-23 PROCEDURE — 21400001 HC TELEMETRY ROOM

## 2023-05-23 PROCEDURE — 97110 THERAPEUTIC EXERCISES: CPT | Mod: CQ

## 2023-05-23 PROCEDURE — 97530 THERAPEUTIC ACTIVITIES: CPT | Mod: CQ

## 2023-05-23 PROCEDURE — 25000003 PHARM REV CODE 250: Performed by: STUDENT IN AN ORGANIZED HEALTH CARE EDUCATION/TRAINING PROGRAM

## 2023-05-23 PROCEDURE — 27000207 HC ISOLATION

## 2023-05-23 RX ORDER — LACTULOSE 10 G/15ML
30 SOLUTION ORAL ONCE
Status: DISCONTINUED | OUTPATIENT
Start: 2023-05-23 | End: 2023-05-25 | Stop reason: HOSPADM

## 2023-05-23 RX ADMIN — COLLAGENASE SANTYL: 250 OINTMENT TOPICAL at 08:05

## 2023-05-23 RX ADMIN — ISOSORBIDE DINITRATE 10 MG: 10 TABLET ORAL at 09:05

## 2023-05-23 RX ADMIN — OXYCODONE HYDROCHLORIDE 5 MG: 5 TABLET ORAL at 06:05

## 2023-05-23 RX ADMIN — APIXABAN 5 MG: 5 TABLET, FILM COATED ORAL at 08:05

## 2023-05-23 RX ADMIN — ATORVASTATIN CALCIUM 80 MG: 40 TABLET, FILM COATED ORAL at 08:05

## 2023-05-23 RX ADMIN — Medication 6 MG: at 08:05

## 2023-05-23 RX ADMIN — INSULIN ASPART 12 UNITS: 100 INJECTION, SOLUTION INTRAVENOUS; SUBCUTANEOUS at 05:05

## 2023-05-23 RX ADMIN — PANTOPRAZOLE SODIUM 40 MG: 40 TABLET, DELAYED RELEASE ORAL at 08:05

## 2023-05-23 RX ADMIN — POLYETHYLENE GLYCOL 3350 17 G: 17 POWDER, FOR SOLUTION ORAL at 08:05

## 2023-05-23 RX ADMIN — ISOSORBIDE DINITRATE 10 MG: 10 TABLET ORAL at 08:05

## 2023-05-23 RX ADMIN — INSULIN ASPART 1 UNITS: 100 INJECTION, SOLUTION INTRAVENOUS; SUBCUTANEOUS at 08:05

## 2023-05-23 RX ADMIN — OXYCODONE HYDROCHLORIDE 5 MG: 5 TABLET ORAL at 08:05

## 2023-05-23 RX ADMIN — INSULIN ASPART 6 UNITS: 100 INJECTION, SOLUTION INTRAVENOUS; SUBCUTANEOUS at 12:05

## 2023-05-23 RX ADMIN — INSULIN ASPART 12 UNITS: 100 INJECTION, SOLUTION INTRAVENOUS; SUBCUTANEOUS at 12:05

## 2023-05-23 RX ADMIN — LOSARTAN POTASSIUM 100 MG: 50 TABLET, FILM COATED ORAL at 08:05

## 2023-05-23 RX ADMIN — INSULIN ASPART 4 UNITS: 100 INJECTION, SOLUTION INTRAVENOUS; SUBCUTANEOUS at 08:05

## 2023-05-23 RX ADMIN — DOCUSATE SODIUM AND SENNOSIDES 1 TABLET: 8.6; 5 TABLET, FILM COATED ORAL at 08:05

## 2023-05-23 RX ADMIN — INSULIN ASPART 4 UNITS: 100 INJECTION, SOLUTION INTRAVENOUS; SUBCUTANEOUS at 05:05

## 2023-05-23 RX ADMIN — ISOSORBIDE DINITRATE 10 MG: 10 TABLET ORAL at 02:05

## 2023-05-23 RX ADMIN — NIFEDIPINE 60 MG: 60 TABLET, FILM COATED, EXTENDED RELEASE ORAL at 08:05

## 2023-05-23 RX ADMIN — INSULIN ASPART 12 UNITS: 100 INJECTION, SOLUTION INTRAVENOUS; SUBCUTANEOUS at 08:05

## 2023-05-23 RX ADMIN — CLOPIDOGREL BISULFATE 75 MG: 75 TABLET ORAL at 08:05

## 2023-05-23 RX ADMIN — TAMSULOSIN HYDROCHLORIDE 0.4 MG: 0.4 CAPSULE ORAL at 08:05

## 2023-05-23 NOTE — NURSING
"Patient refusing lactulose at this time.  Patient states "I'll have a bowel movement when my body is ready".  Patient educated on the importance of the lactulose.  Will continue to monitor.   "

## 2023-05-23 NOTE — PLAN OF CARE
Plan of care reviewed with patient.  Patient verbalized understanding and had no further questions.  Patient wound care completed by nurse on previous shift.  Patient refusing lactulose this shift, and physician notified.  Patient being turned every two hours with pillows and heel boots applied.  Patient now resting comfortably in bed locked in lowest position, side rails up x3, and call bell in reach.  Will continue to monitor.       Problem: Adult Inpatient Plan of Care  Goal: Plan of Care Review  Outcome: Ongoing, Progressing  Goal: Patient-Specific Goal (Individualized)  Outcome: Ongoing, Progressing  Goal: Absence of Hospital-Acquired Illness or Injury  Outcome: Ongoing, Progressing  Goal: Optimal Comfort and Wellbeing  Outcome: Ongoing, Progressing  Goal: Readiness for Transition of Care  Outcome: Ongoing, Progressing     Problem: Diabetes Comorbidity  Goal: Blood Glucose Level Within Targeted Range  Outcome: Ongoing, Progressing     Problem: Fluid and Electrolyte Imbalance (Acute Kidney Injury/Impairment)  Goal: Fluid and Electrolyte Balance  Outcome: Ongoing, Progressing     Problem: Oral Intake Inadequate (Acute Kidney Injury/Impairment)  Goal: Optimal Nutrition Intake  Outcome: Ongoing, Progressing     Problem: Renal Function Impairment (Acute Kidney Injury/Impairment)  Goal: Effective Renal Function  Outcome: Ongoing, Progressing     Problem: Impaired Wound Healing  Goal: Optimal Wound Healing  Outcome: Ongoing, Progressing     Problem: Skin Injury Risk Increased  Goal: Skin Health and Integrity  Outcome: Ongoing, Progressing     Problem: Fall Injury Risk  Goal: Absence of Fall and Fall-Related Injury  Outcome: Ongoing, Progressing     Problem: Infection  Goal: Absence of Infection Signs and Symptoms  Outcome: Ongoing, Progressing     Problem: UTI (Urinary Tract Infection)  Goal: Improved Infection Symptoms  Outcome: Ongoing, Progressing     Problem: Hypertension Comorbidity  Goal: Blood Pressure in  Desired Range  Outcome: Ongoing, Progressing

## 2023-05-23 NOTE — PT/OT/SLP PROGRESS
"Occupational Therapy      Patient Name:  Saji Castañeda   MRN:  [id number]    Patient not seen today secondary to Patient unwilling to participate (AM: Pt unwilling to particiapte stating " I am not ready now, come back later."  PM: Pt working with PT.). Will follow-up at later time.    5/23/2023  "

## 2023-05-23 NOTE — PLAN OF CARE
SW met with pt at bedside during rounds with MD. No dc today. Pts dispo pending at this time. SW will continue to follow pt throughout his transitions of care and assist with any dc needs.     Future Appointments   Date Time Provider Department Center   5/30/2023  2:30 PM Narciso Hernandez DPM [facility]        05/23/23 1029   Post-Acute Status   Post-Acute Authorization Other

## 2023-05-23 NOTE — ASSESSMENT & PLAN NOTE
H/o chronic osteomyelitis s/p L TMA with severe underlying PAD  Vascular surgery at [facility] recommends bilateral AKA but patient adamantly declines     Cardiology consult -not a candidate for revascularization    Surgery consult.  Patient has declined amputations in the past.  Consider palliative care consult.    Not on antibiotics per ID recommendations at [facility] given poor blood flow. Clinically stable at this time.    Wound care consult. Will place orders based on [facility] recommendations.

## 2023-05-23 NOTE — PROGRESS NOTES
IP Liaison - Initial Visit Note    Patient: Saji Castañeda  MRN:  [id number]  Date of Service:  5/23/2023  Completed by:  LARON Albert    Reason for Visit   Patient presents with    IP Liaison Initial Visit       RSW met with patient at bedside in order to complete SDOH questionnaire and liaison assessment.  Pt has identified no social barriers to care. Pt declined the need for resources at this time.    The following were addressed during this visit:  - Review SDOH Questions   - Complete patient assessment   - Complete initial visit with patient        Patient Summary     IP Liaison Patient Assessment    General  Level of Caregiver support: Member independent and does not need caregiver assistance  Have you had to make a decision between paying for any of the following in the last 2 months?: None  Transportation means: Family  Employment status: Disabled  Assessments  Was the PHQ Depression Screening completed this visit?: No  Was the DEL-7 Screening completed this visit?: No       LARON Albert

## 2023-05-23 NOTE — PLAN OF CARE
Problem: Adult Inpatient Plan of Care  Goal: Plan of Care Review  Outcome: Ongoing, Progressing  Goal: Patient-Specific Goal (Individualized)  Outcome: Ongoing, Progressing  Goal: Absence of Hospital-Acquired Illness or Injury  Outcome: Ongoing, Progressing  Goal: Optimal Comfort and Wellbeing  Outcome: Ongoing, Progressing  Goal: Readiness for Transition of Care  Outcome: Ongoing, Progressing     Problem: Diabetes Comorbidity  Goal: Blood Glucose Level Within Targeted Range  Outcome: Ongoing, Progressing     Problem: Fluid and Electrolyte Imbalance (Acute Kidney Injury/Impairment)  Goal: Fluid and Electrolyte Balance  Outcome: Ongoing, Progressing     Problem: Oral Intake Inadequate (Acute Kidney Injury/Impairment)  Goal: Optimal Nutrition Intake  Outcome: Ongoing, Progressing     Problem: Renal Function Impairment (Acute Kidney Injury/Impairment)  Goal: Effective Renal Function  Outcome: Ongoing, Progressing     Problem: Impaired Wound Healing  Goal: Optimal Wound Healing  Outcome: Ongoing, Progressing     Problem: Skin Injury Risk Increased  Goal: Skin Health and Integrity  Outcome: Ongoing, Progressing     Problem: Fall Injury Risk  Goal: Absence of Fall and Fall-Related Injury  Outcome: Ongoing, Progressing     Problem: Infection  Goal: Absence of Infection Signs and Symptoms  Outcome: Ongoing, Progressing     Problem: UTI (Urinary Tract Infection)  Goal: Improved Infection Symptoms  Outcome: Ongoing, Progressing     Problem: Hypertension Comorbidity  Goal: Blood Pressure in Desired Range  Outcome: Ongoing, Progressing

## 2023-05-23 NOTE — PLAN OF CARE
Problem: Physical Therapy  Goal: Physical Therapy Goal  Description: Goals to be met by: 23     Patient will increase functional independence with mobility by performin. Supine to sit with Modified [place]  2. Sit to supine with Modified [place]  3. Bed to chair transfer with Supervision  squat pivot or using sliding board   4. Wheelchair propulsion x 100 feet with Modified [place] using bilateral uppper extremities    Outcome: Ongoing, Progressing

## 2023-05-23 NOTE — SUBJECTIVE & OBJECTIVE
Interval History:  Not a revascularization candidate.  Surgery consult pending.  No bowel movement in more than 5 days.  Lactulose guarded but patient refused.  No family at bedside.    Review of Systems   Respiratory:  Negative for shortness of breath.    Gastrointestinal:  Negative for abdominal pain and nausea.   Objective:     Vital Signs (Most Recent):  Temp: 98.1 °F (36.7 °C) (05/23/23 1154)  Pulse: (!) 38 (05/23/23 1208)  Resp: 20 (05/23/23 1154)  BP: 134/63 (05/23/23 1154)  SpO2: 97 % (05/23/23 1154) Vital Signs (24h Range):  Temp:  [96.9 °F (36.1 °C)-98.6 °F (37 °C)] 98.1 °F (36.7 °C)  Pulse:  [38-54] 38  Resp:  [18-20] 20  SpO2:  [95 %-98 %] 97 %  BP: (130-155)/(63-70) 134/63     Weight: 113.3 kg (249 lb 12.5 oz)  Body mass index is 36.89 kg/m².    Intake/Output Summary (Last 24 hours) at 5/23/2023 1401  Last data filed at 5/22/2023 2054  Gross per 24 hour   Intake --   Output 600 ml   Net -600 ml           Physical Exam  Vitals and nursing note reviewed.   Constitutional:       General: He is not in acute distress.     Appearance: He is well-developed.   HENT:      Mouth/Throat:      Mouth: Mucous membranes are moist.   Eyes:      General: No scleral icterus.  Neck:      Trachea: No tracheal deviation.   Cardiovascular:      Rate and Rhythm: Regular rhythm. Bradycardia present.      Heart sounds: Normal heart sounds. No murmur heard.  Pulmonary:      Effort: Pulmonary effort is normal. No respiratory distress.      Breath sounds: Normal breath sounds. No wheezing.   Abdominal:      General: Bowel sounds are normal. There is no distension.      Palpations: Abdomen is soft.      Tenderness: There is no abdominal tenderness.   Musculoskeletal:      Right lower leg: Edema present.      Left lower leg: Edema present.      Comments: Bilateral LE wrapped, s/p L TMA   Skin:     General: Skin is warm and dry.   Neurological:      Mental Status: He is alert and oriented to person, place, and time.   Psychiatric:          Behavior: Behavior normal.           Significant Labs: All pertinent labs within the past 24 hours have been reviewed.    Significant Imaging: I have reviewed all pertinent imaging results/findings within the past 24 hours.

## 2023-05-23 NOTE — CONSULTS
Today`s Date: 5/23/2023     Admit Date: 5/19/2023    Admitting Physician: Anna Mcdonnell MD    Patient`s Name: Saji Castañeda , 73 y.o. male    Reason for consultation  Evaluation for amputation.   Patient Active Problem List:     Edema of leg     Tinea pedis     Wound, open, foot with complication     Diabetic neuropathy     Essential hypertension     Insulin dependent type 2 diabetes mellitus     Cataract cortical, senile     YNES (acute kidney injury)     Anemia of chronic disease     Asymptomatic Mobitz (type) I (Wenckebach's) atrioventricular block     Peripheral arterial disease     PVD (peripheral vascular disease)     Cellulitis of left ankle     Osteomyelitis of left lower extremity     Pressure injury of left heel, stage 4     Morbid obesity     Iron deficiency anemia     Acute osteomyelitis of calcaneum, left     Leg swelling     Foot pain, left     Chronic deep vein thrombosis (DVT) of right upper extremity     Goals of care, counseling/discussion     Nonhealing ulcer of right lower leg with fat layer exposed     Hypergranulation     Subacute osteomyelitis of left foot     Osteomyelitis of right lower extremity     Chronic osteomyelitis     Hyperglycemia due to diabetes mellitus     UTI (urinary tract infection)      Past Medical History:  No date: Arthritis      Comment:  legs  No date: Diabetes mellitus  No date: Diabetes mellitus, type 2  No date: Hyperlipidemia  No date: Hypertension  No date: Osteomyelitis    Past Surgical History:  2/3/2021: ANGIOGRAPHY OF LOWER EXTREMITY; N/A      Comment:  Procedure: Angiogram Extremity Bilateral;  Surgeon:                Ernst Chacko MD;  Location: [facility] OR [address] [place] [address];                 Service: Peripheral Vascular;  Laterality: N/A;  7.4                mintues fluoroscopy [id number] Gycm2  2/3/2021: AORTOGRAPHY WITH EXTREMITY RUNOFF; Bilateral      Comment:  Procedure: AORTOGRAM, WITH EXTREMITY RUNOFF;  Surgeon:                Ernst Chacko,  "MD;  Location: [facility] OR [address] [place] [address];                 Service: Peripheral Vascular;  Laterality: Bilateral;  2/23/2021: DEBRIDEMENT OF FOOT; Left      Comment:  Procedure: DEBRIDEMENT, LEFT HEEL;  Surgeon: Mayra Schroeder DPM;  Location: [facility] OR [address] [place] [address];  Service:               Podiatry;  Laterality: Left;  October 2010: FOOT AMPUTATION      Comment:  left high midfoot amputation    Prior to Admission medications :  Medication apixaban (ELIQUIS) 5 mg Tab, Sig Take 1 tablet (5 mg total) by mouth 2 (two) times daily., Start Date 9/19/22, End Date , Taking? , Authorizing Provider Merlin Kearney MD    Medication ascorbic acid, vitamin C, (VITAMIN C) 500 MG tablet, Sig Take 500 mg by mouth once daily., Start Date , End Date , Taking? , Authorizing Provider Historical Provider    Medication aspirin (ECOTRIN) 81 MG EC tablet, Sig Take 81 mg by mouth once daily., Start Date , End Date , Taking? , Authorizing Provider Historical Provider    Medication B COMPLEX-VITAMIN B12 tablet, Sig Take 1 tablet by mouth once daily., Start Date 1/3/23, End Date , Taking? , Authorizing Provider Historical Provider    Medication BD ULTRA-FINE ADITYA PEN NEEDLE 32 gauge x 5/32" Ndle, Sig USE FOUR TIMES DAILY WITH MEALS AND NIGHTLY, Start Date 11/20/19, End Date , Taking? , Authorizing Provider Miriam Duong NP    Medication blood sugar diagnostic (CONTOUR TEST STRIPS) Strp, Sig Inject 1 strip into the skin 2 (two) times daily before meals., Start Date 5/27/16, End Date , Taking? , Authorizing Provider ANIYAH Borges, FNP    Medication clopidogreL (PLAVIX) 75 mg tablet, Sig Take 1 tablet (75 mg total) by mouth once daily., Start Date 9/20/22, End Date 9/20/23, Taking? , Authorizing Provider Merlin Kearney MD    Medication furosemide (LASIX) 40 MG tablet, Sig Take 40 mg by mouth once daily., Start Date 1/17/23, End Date , Taking? , Authorizing Provider Historical Provider    Medication gabapentin (NEURONTIN) 100 MG " capsule, Sig Take 2 capsules (200 mg total) by mouth 2 (two) times daily.Patient taking differently: Take 300 mg by mouth 2 (two) times daily., Start Date 12/14/20, End Date 6/20/22, Taking? , Authorizing Provider Kun Shook MD    Medication glipiZIDE (GLUCOTROL) 10 MG tablet, Sig Take 20 mg by mouth 2 (two) times a day., Start Date 2/15/23, End Date , Taking? , Authorizing Provider Historical Provider    Medication hydrALAZINE (APRESOLINE) 25 MG tablet, Sig Take 1 tablet (25 mg total) by mouth 3 (three) times daily.Patient taking differently: Take 25 mg by mouth 2 (two) times a day., Start Date 7/8/22, End Date 7/8/23, Taking? , Authorizing Provider Salma Strauss MD    Medication insulin aspart U-100 (NOVOLOG) 100 unit/mL (3 mL) InPn pen, Sig Inject 20 Units into the skin 3 (three) times daily with meals.Patient not taking: Reported on 5/17/2023, Start Date 6/27/22, End Date 6/27/23, Taking? , Authorizing Provider Salma Strauss MD    Medication isosorbide dinitrate (ISORDIL) 10 MG tablet, Sig Take 1 tablet (10 mg total) by mouth 3 (three) times daily., Start Date 7/8/22, End Date 7/8/23, Taking? , Authorizing Provider Salma Strauss MD    Medication LANTUS SOLOSTAR U-100 INSULIN glargine 100 units/mL SubQ pen, Sig Inject 45 Units into the skin every evening.Patient taking differently: Inject 50 Units into the skin every evening., Start Date 7/8/22, End Date , Taking? , Authorizing Provider Salma Strauss MD    Medication losartan-hydrochlorothiazide 100-25 mg (HYZAAR) 100-25 mg per tablet, Sig Take 1 tablet by mouth once daily., Start Date 1/23/23, End Date , Taking? , Authorizing Provider Historical Provider    Medication metFORMIN (GLUCOPHAGE-XR) 500 MG ER 24hr tablet, Sig Take 1,000 mg by mouth 2 (two) times a day., Start Date 1/17/23, End Date , Taking? , Authorizing Provider Historical Provider    Medication MICROLET LANCET Misc, Sig , Start Date 4/18/14, End Date , Taking? , Authorizing  "Provider Historical Provider    Medication NIFEdipine (ADALAT CC) 60 MG TbSR, Sig Take 60 mg by mouth once daily., Start Date 2/6/23, End Date , Taking? , Authorizing Provider Historical Provider    Medication pantoprazole (PROTONIX) 40 MG tablet, Sig Take 40 mg by mouth once daily., Start Date 2/6/23, End Date , Taking? , Authorizing Provider Historical Provider    Medication rosuvastatin (CRESTOR) 40 MG Tab, Sig Take 40 mg by mouth once daily., Start Date 4/9/21, End Date , Taking? , Authorizing Provider Historical Provider    Medication tamsulosin (FLOMAX) 0.4 mg Cap, Sig Take 0.4 mg by mouth once daily., Start Date , End Date , Taking? , Authorizing Provider Historical Provider    Medication hydroCHLOROthiazide (HYDRODIURIL) 25 MG tablet, Sig Take 0.5 tablets (12.5 mg total) by mouth every Mon, Wed, Fri. Beginning on 7/4/2022, Start Date 7/4/22, End Date 7/8/22, Taking? , Authorizing Provider Keerthi Mayorga MD      No current facility-administered medications on file prior to encounter.  Current Outpatient Medications on File Prior to Encounter:  apixaban (ELIQUIS) 5 mg Tab, Take 1 tablet (5 mg total) by mouth 2 (two) times daily., Disp: 60 tablet, Rfl: 0  ascorbic acid, vitamin C, (VITAMIN C) 500 MG tablet, Take 500 mg by mouth once daily., Disp: , Rfl:   aspirin (ECOTRIN) 81 MG EC tablet, Take 81 mg by mouth once daily., Disp: , Rfl:   B COMPLEX-VITAMIN B12 tablet, Take 1 tablet by mouth once daily., Disp: , Rfl:   BD ULTRA-FINE ADITYA PEN NEEDLE 32 gauge x 5/32" Ndle, USE FOUR TIMES DAILY WITH MEALS AND NIGHTLY, Disp: 100 each, Rfl: 0  blood sugar diagnostic (CONTOUR TEST STRIPS) Strp, Inject 1 strip into the skin 2 (two) times daily before meals., Disp: 100 strip, Rfl: 6  clopidogreL (PLAVIX) 75 mg tablet, Take 1 tablet (75 mg total) by mouth once daily., Disp: 60 tablet, Rfl: 0  furosemide (LASIX) 40 MG tablet, Take 40 mg by mouth once daily., Disp: , Rfl:   gabapentin (NEURONTIN) 100 MG capsule, Take 2 " capsules (200 mg total) by mouth 2 (two) times daily. (Patient taking differently: Take 300 mg by mouth 2 (two) times daily.), Disp: 120 capsule, Rfl: 1  glipiZIDE (GLUCOTROL) 10 MG tablet, Take 20 mg by mouth 2 (two) times a day., Disp: , Rfl:   hydrALAZINE (APRESOLINE) 25 MG tablet, Take 1 tablet (25 mg total) by mouth 3 (three) times daily. (Patient taking differently: Take 25 mg by mouth 2 (two) times a day.), Disp: 90 tablet, Rfl: 11  insulin aspart U-100 (NOVOLOG) 100 unit/mL (3 mL) InPn pen, Inject 20 Units into the skin 3 (three) times daily with meals. (Patient not taking: Reported on 5/17/2023), Disp: , Rfl: 0  isosorbide dinitrate (ISORDIL) 10 MG tablet, Take 1 tablet (10 mg total) by mouth 3 (three) times daily., Disp: 90 tablet, Rfl: 11  LANTUS SOLOSTAR U-100 INSULIN glargine 100 units/mL SubQ pen, Inject 45 Units into the skin every evening. (Patient taking differently: Inject 50 Units into the skin every evening.), Disp: , Rfl: 0  losartan-hydrochlorothiazide 100-25 mg (HYZAAR) 100-25 mg per tablet, Take 1 tablet by mouth once daily., Disp: , Rfl:   metFORMIN (GLUCOPHAGE-XR) 500 MG ER 24hr tablet, Take 1,000 mg by mouth 2 (two) times a day., Disp: , Rfl:   MICROLET LANCET Misc, , Disp: , Rfl: 0  NIFEdipine (ADALAT CC) 60 MG TbSR, Take 60 mg by mouth once daily., Disp: , Rfl:   pantoprazole (PROTONIX) 40 MG tablet, Take 40 mg by mouth once daily., Disp: , Rfl:   rosuvastatin (CRESTOR) 40 MG Tab, Take 40 mg by mouth once daily., Disp: , Rfl:   tamsulosin (FLOMAX) 0.4 mg Cap, Take 0.4 mg by mouth once daily., Disp: , Rfl:   [DISCONTINUED] hydroCHLOROthiazide (HYDRODIURIL) 25 MG tablet, Take 0.5 tablets (12.5 mg total) by mouth every Mon, Wed, Fri. Beginning on 7/4/2022, Disp: , Rfl:          Review of patient's allergies indicates:  No Known Allergies    Social History:   reports that he has never smoked. He has never used smokeless tobacco. He reports that he does not drink alcohol and does not use  drugs.     Review of patient's family history indicates:      PHYSICAL EXAMINATION  Temp:  [96.9 °F (36.1 °C)-98.2 °F (36.8 °C)] 97.6 °F (36.4 °C)  Pulse:  [38-54] 44  Resp:  [18-20] 20  SpO2:  [95 %-99 %] 99 %  BP: (130-155)/(63-70) 132/63    General Condition:   Alert x  3     Head & Neck  Anemia: None  Jaundice: None  Neck vein: Not distended  Carotid Bruits: none  Lymph nodes: none palpable  Thyroid: normal    Chest: normal    Heart: normal    Rt. Breast: not examined  Lt. Breast: not examined  Axillary lymph nodes: none    Abdomen: Soft,  None tender with no palpable mass or organ  Hernia: none    Rectal: Defered    Extremities: normal    Vascular: normal    Specific focus Examination      Imp: chronic osteomyelitis lft foot stump and right ankle  Dm   Htn    Plan: continue present treatment with further local debridement .Patient does not want amputation

## 2023-05-23 NOTE — PT/OT/SLP PROGRESS
Physical Therapy Treatment    Patient Name:  Saji Castañeda   MRN:  [id number]    Recommendations:     Discharge Recommendations: LTACH (long-term acute care [facility])  Discharge Equipment Recommendations: slide board, hospital bed (w/c cushion)  Barriers to discharge:  decreased mobility,strength and endurance    Assessment:     Saji Castañeda is a 73 y.o. male admitted with a medical diagnosis of Chronic osteomyelitis.  He presents with the following impairments/functional limitations: weakness, impaired endurance, impaired functional mobility, impaired balance, decreased lower extremity function, pain, decreased ROM, impaired skin, orthopedic precautions,pt with good participation and requires assistance with transfers at this time,pt unable to ambulate and has difficulty maintaining PWB at R heel with Darco,pt will benefit from LTACH services upon discharge.    Rehab Prognosis: Fair; patient would benefit from acute skilled PT services to address these deficits and reach maximum level of function.    Recent Surgery: * No surgery found *      Plan:     During this hospitalization, patient to be seen 5 x/week to address the identified rehab impairments via therapeutic activities, therapeutic exercises, neuromuscular re-education and progress toward the following goals:    Plan of Care Expires:  06/20/23    Subjective     Chief Complaint: n/a  Patient/Family Comments/goals: pt agreeable to rx.  Pain/Comfort:  Pain Rating 1:  (no rating)  Location - Side 1: Bilateral  Location - Orientation 1: lower  Location 1:  (legs)  Pain Addressed 1: Reposition, Distraction, Cessation of Activity      Objective:     Communicated with nsg prior to session.  Patient found supine with bed alarm, fung catheter, pressure relief boots, peripheral IV upon PT entry to room.     General Precautions: Standard, contact, fall  Orthopedic Precautions: LLE non weight bearing (PWB R heel with Darco)  Braces:  (R Darco shoe)  Respiratory Status:  Room air     Functional Mobility:  Bed Mobility:     Supine to Sit: contact guard assistance  Sit to Supine: stand by assistance  Transfers:     Sit to Stand:  moderate assistance and X 2 attempts with rolling walker and R Darco donned  Balance: good sitting balance      AM-PAC 6 CLICK MOBILITY  Turning over in bed (including adjusting bedclothes, sheets and blankets)?: 3  Sitting down on and standing up from a chair with arms (e.g., wheelchair, bedside commode, etc.): 2  Moving from lying on back to sitting on the side of the bed?: 3  Moving to and from a bed to a chair (including a wheelchair)?: 2  Need to walk in hospital room?: 1  Climbing 3-5 steps with a railing?: 1  Basic Mobility Total Score: 12       Treatment & Education: pt stood ~ 6-8 seconds X 2 trials with Mod A NWB on L le and unable to maintain PWB at R heel with Darco so had pt to sit back down,le seated ex's X 15 reps inc: laq's and hip flex.      Patient left supine with all lines intact, call button in reach, bed alarm on, and nsg notified..    GOALS: see general POC  Multidisciplinary Problems       Physical Therapy Goals          Problem: Physical Therapy    Goal Priority Disciplines Outcome Goal Variances Interventions   Physical Therapy Goal     PT, PT/OT Ongoing, Progressing     Description: Goals to be met by: 23     Patient will increase functional independence with mobility by performin. Supine to sit with Modified Arroyo  2. Sit to supine with Modified [place]  3. Bed to chair transfer with Supervision  squat pivot or using sliding board   4. Wheelchair propulsion x 100 feet with Modified Arroyo using bilateral uppper extremities                         Time Tracking:     PT Received On: 23  PT Start Time:       PT Stop Time:    PT Total Time (min):       Billable Minutes: Therapeutic Activity 18 and Therapeutic Exercise 10    Treatment Type: Treatment  PT/PTA: PTA     Number of PTA visits since last PT  visit: 1     05/23/2023

## 2023-05-24 ENCOUNTER — PATIENT OUTREACH (OUTPATIENT)
Dept: ADMINISTRATIVE | Facility: OTHER | Age: 73
End: 2023-05-24
Payer: MEDICARE

## 2023-05-24 PROBLEM — Z51.5 PALLIATIVE CARE ENCOUNTER: Status: ACTIVE | Noted: 2023-05-24

## 2023-05-24 PROBLEM — I44.1 AV BLOCK, 2ND DEGREE: Status: ACTIVE | Noted: 2023-05-24

## 2023-05-24 LAB
ANION GAP SERPL CALC-SCNC: 6 MMOL/L (ref 8–16)
BASOPHILS # BLD AUTO: 0.03 K/UL (ref 0–0.2)
BASOPHILS NFR BLD: 0.4 % (ref 0–1.9)
BUN SERPL-MCNC: 33 MG/DL (ref 8–23)
CALCIUM SERPL-MCNC: 8.8 MG/DL (ref 8.7–10.5)
CHLORIDE SERPL-SCNC: 101 MMOL/L (ref 95–110)
CO2 SERPL-SCNC: 28 MMOL/L (ref 23–29)
CREAT SERPL-MCNC: 1.4 MG/DL (ref 0.5–1.4)
DIFFERENTIAL METHOD: ABNORMAL
EOSINOPHIL # BLD AUTO: 0.5 K/UL (ref 0–0.5)
EOSINOPHIL NFR BLD: 5.8 % (ref 0–8)
ERYTHROCYTE [DISTWIDTH] IN BLOOD BY AUTOMATED COUNT: 17.1 % (ref 11.5–14.5)
EST. GFR  (NO RACE VARIABLE): 53 ML/MIN/1.73 M^2
GLUCOSE SERPL-MCNC: 167 MG/DL (ref 70–110)
HCT VFR BLD AUTO: 26.3 % (ref 40–54)
HGB BLD-MCNC: 8.1 G/DL (ref 14–18)
IMM GRANULOCYTES # BLD AUTO: 0.02 K/UL (ref 0–0.04)
IMM GRANULOCYTES NFR BLD AUTO: 0.3 % (ref 0–0.5)
LYMPHOCYTES # BLD AUTO: 1.4 K/UL (ref 1–4.8)
LYMPHOCYTES NFR BLD: 18.1 % (ref 18–48)
MCH RBC QN AUTO: 26.3 PG (ref 27–31)
MCHC RBC AUTO-ENTMCNC: 30.8 G/DL (ref 32–36)
MCV RBC AUTO: 85 FL (ref 82–98)
MONOCYTES # BLD AUTO: 0.8 K/UL (ref 0.3–1)
MONOCYTES NFR BLD: 9.8 % (ref 4–15)
NEUTROPHILS # BLD AUTO: 5.2 K/UL (ref 1.8–7.7)
NEUTROPHILS NFR BLD: 65.6 % (ref 38–73)
NRBC BLD-RTO: 0 /100 WBC
PLATELET # BLD AUTO: 339 K/UL (ref 150–450)
PMV BLD AUTO: 9.5 FL (ref 9.2–12.9)
POCT GLUCOSE: 159 MG/DL (ref 70–110)
POCT GLUCOSE: 165 MG/DL (ref 70–110)
POCT GLUCOSE: 170 MG/DL (ref 70–110)
POCT GLUCOSE: 171 MG/DL (ref 70–110)
POCT GLUCOSE: 233 MG/DL (ref 70–110)
POTASSIUM SERPL-SCNC: 4.4 MMOL/L (ref 3.5–5.1)
RBC # BLD AUTO: 3.08 M/UL (ref 4.6–6.2)
SODIUM SERPL-SCNC: 135 MMOL/L (ref 136–145)
WBC # BLD AUTO: 7.92 K/UL (ref 3.9–12.7)

## 2023-05-24 PROCEDURE — 25000003 PHARM REV CODE 250: Performed by: STUDENT IN AN ORGANIZED HEALTH CARE EDUCATION/TRAINING PROGRAM

## 2023-05-24 PROCEDURE — 27000207 HC ISOLATION

## 2023-05-24 PROCEDURE — 97530 THERAPEUTIC ACTIVITIES: CPT | Mod: CO

## 2023-05-24 PROCEDURE — 99223 1ST HOSP IP/OBS HIGH 75: CPT | Mod: 25,,,

## 2023-05-24 PROCEDURE — 99233 PR SUBSEQUENT HOSPITAL CARE,LEVL III: ICD-10-PCS | Mod: FS,,, | Performed by: INTERNAL MEDICINE

## 2023-05-24 PROCEDURE — 85025 COMPLETE CBC W/AUTO DIFF WBC: CPT | Performed by: STUDENT IN AN ORGANIZED HEALTH CARE EDUCATION/TRAINING PROGRAM

## 2023-05-24 PROCEDURE — 99497 PR ADVNCD CARE PLAN 30 MIN: ICD-10-PCS | Mod: 25,,,

## 2023-05-24 PROCEDURE — 97530 THERAPEUTIC ACTIVITIES: CPT | Mod: CQ

## 2023-05-24 PROCEDURE — 99233 SBSQ HOSP IP/OBS HIGH 50: CPT | Mod: FS,,, | Performed by: INTERNAL MEDICINE

## 2023-05-24 PROCEDURE — 99497 ADVNCD CARE PLAN 30 MIN: CPT | Mod: 25,,,

## 2023-05-24 PROCEDURE — 21400001 HC TELEMETRY ROOM

## 2023-05-24 PROCEDURE — 99223 PR INITIAL HOSPITAL CARE,LEVL III: ICD-10-PCS | Mod: 25,,,

## 2023-05-24 PROCEDURE — 97535 SELF CARE MNGMENT TRAINING: CPT | Mod: CO

## 2023-05-24 PROCEDURE — 80048 BASIC METABOLIC PNL TOTAL CA: CPT | Performed by: STUDENT IN AN ORGANIZED HEALTH CARE EDUCATION/TRAINING PROGRAM

## 2023-05-24 PROCEDURE — 36415 COLL VENOUS BLD VENIPUNCTURE: CPT | Performed by: STUDENT IN AN ORGANIZED HEALTH CARE EDUCATION/TRAINING PROGRAM

## 2023-05-24 PROCEDURE — 97110 THERAPEUTIC EXERCISES: CPT | Mod: CQ

## 2023-05-24 RX ADMIN — INSULIN ASPART 12 UNITS: 100 INJECTION, SOLUTION INTRAVENOUS; SUBCUTANEOUS at 08:05

## 2023-05-24 RX ADMIN — Medication 6 MG: at 09:05

## 2023-05-24 RX ADMIN — LOSARTAN POTASSIUM 100 MG: 50 TABLET, FILM COATED ORAL at 08:05

## 2023-05-24 RX ADMIN — INSULIN ASPART 1 UNITS: 100 INJECTION, SOLUTION INTRAVENOUS; SUBCUTANEOUS at 09:05

## 2023-05-24 RX ADMIN — INSULIN ASPART 2 UNITS: 100 INJECTION, SOLUTION INTRAVENOUS; SUBCUTANEOUS at 05:05

## 2023-05-24 RX ADMIN — TAMSULOSIN HYDROCHLORIDE 0.4 MG: 0.4 CAPSULE ORAL at 08:05

## 2023-05-24 RX ADMIN — COLLAGENASE SANTYL: 250 OINTMENT TOPICAL at 08:05

## 2023-05-24 RX ADMIN — ISOSORBIDE DINITRATE 10 MG: 10 TABLET ORAL at 05:05

## 2023-05-24 RX ADMIN — NIFEDIPINE 60 MG: 60 TABLET, FILM COATED, EXTENDED RELEASE ORAL at 08:05

## 2023-05-24 RX ADMIN — PANTOPRAZOLE SODIUM 40 MG: 40 TABLET, DELAYED RELEASE ORAL at 08:05

## 2023-05-24 RX ADMIN — INSULIN ASPART 12 UNITS: 100 INJECTION, SOLUTION INTRAVENOUS; SUBCUTANEOUS at 05:05

## 2023-05-24 RX ADMIN — DOCUSATE SODIUM AND SENNOSIDES 1 TABLET: 8.6; 5 TABLET, FILM COATED ORAL at 08:05

## 2023-05-24 RX ADMIN — CLOPIDOGREL BISULFATE 75 MG: 75 TABLET ORAL at 08:05

## 2023-05-24 RX ADMIN — ATORVASTATIN CALCIUM 80 MG: 40 TABLET, FILM COATED ORAL at 08:05

## 2023-05-24 RX ADMIN — ISOSORBIDE DINITRATE 10 MG: 10 TABLET ORAL at 09:05

## 2023-05-24 RX ADMIN — INSULIN ASPART 12 UNITS: 100 INJECTION, SOLUTION INTRAVENOUS; SUBCUTANEOUS at 12:05

## 2023-05-24 RX ADMIN — APIXABAN 5 MG: 5 TABLET, FILM COATED ORAL at 08:05

## 2023-05-24 RX ADMIN — DOCUSATE SODIUM AND SENNOSIDES 1 TABLET: 8.6; 5 TABLET, FILM COATED ORAL at 09:05

## 2023-05-24 RX ADMIN — ISOSORBIDE DINITRATE 10 MG: 10 TABLET ORAL at 08:05

## 2023-05-24 RX ADMIN — INSULIN ASPART 4 UNITS: 100 INJECTION, SOLUTION INTRAVENOUS; SUBCUTANEOUS at 12:05

## 2023-05-24 NOTE — PLAN OF CARE
Problem: Physical Therapy  Goal: Physical Therapy Goal  Description: Goals to be met by: 23     Patient will increase functional independence with mobility by performin. Supine to sit with Modified Hopkins  2. Sit to supine with Modified [place]  3. Bed to chair transfer with Supervision  squat pivot or using sliding board   4. Wheelchair propulsion x 100 feet with Modified Hopkins using bilateral uppper extremities    Outcome: Ongoing, Progressing

## 2023-05-24 NOTE — PLAN OF CARE
Problem: Adult Inpatient Plan of Care  Goal: Plan of Care Review  Outcome: Ongoing, Progressing  Goal: Patient-Specific Goal (Individualized)  Outcome: Ongoing, Progressing  Goal: Absence of Hospital-Acquired Illness or Injury  Outcome: Ongoing, Progressing  Goal: Optimal Comfort and Wellbeing  Outcome: Ongoing, Progressing  Goal: Readiness for Transition of Care  Outcome: Ongoing, Progressing     Problem: Diabetes Comorbidity  Goal: Blood Glucose Level Within Targeted Range  Outcome: Ongoing, Progressing     Problem: Fluid and Electrolyte Imbalance (Acute Kidney Injury/Impairment)  Goal: Fluid and Electrolyte Balance  Outcome: Ongoing, Progressing     Problem: Oral Intake Inadequate (Acute Kidney Injury/Impairment)  Goal: Optimal Nutrition Intake  Outcome: Ongoing, Progressing     Problem: Renal Function Impairment (Acute Kidney Injury/Impairment)  Goal: Effective Renal Function  Outcome: Ongoing, Progressing     Problem: Impaired Wound Healing  Goal: Optimal Wound Healing  Outcome: Ongoing, Progressing     Problem: Skin Injury Risk Increased  Goal: Skin Health and Integrity  Outcome: Ongoing, Progressing     Problem: Fall Injury Risk  Goal: Absence of Fall and Fall-Related Injury  Outcome: Ongoing, Progressing     Problem: Infection  Goal: Absence of Infection Signs and Symptoms  Outcome: Ongoing, Progressing     Problem: UTI (Urinary Tract Infection)  Goal: Improved Infection Symptoms  Outcome: Ongoing, Progressing     Problem: Hypertension Comorbidity  Goal: Blood Pressure in Desired Range  Outcome: Ongoing, Progressing       POC reviewed with pt, following- NADN, pt resting quietly this shift. PRN medication given for pain. Contact precautions maintained. No falls or injuries noted, CB in reach at all times.

## 2023-05-24 NOTE — ASSESSMENT & PLAN NOTE
Patient's FSGs are controlled on current medication regimen.  Last A1c reviewed-   Lab Results   Component Value Date    HGBA1C 8.4 (H) 03/10/2023     Most recent fingerstick glucose reviewed-   Recent Labs   Lab 05/23/23  1925 05/23/23  2336 05/24/23  0421 05/24/23  1153   POCTGLUCOSE 179* 151* 170* 233*     Current correctional scale  Medium  Decrease anti-hyperglycemic dose as follows-   Antihyperglycemics (From admission, onward)    Start     Stop Route Frequency Ordered    05/20/23 1130  insulin aspart U-100 pen 12 Units         -- SubQ 3 times daily with meals 05/20/23 0805    05/20/23 0900  insulin detemir U-100 (Levemir) pen 18 Units         -- SubQ 2 times daily 05/20/23 0805    05/19/23 1138  insulin aspart U-100 pen 1-10 Units         -- SubQ Before meals & nightly PRN 05/19/23 1140        Hold Oral hypoglycemics while patient is in the hospital.

## 2023-05-24 NOTE — ASSESSMENT & PLAN NOTE
Mr. Castañeda is a 73-y/oM with a pmx of type 2 diabetes mellitus, hypertension, CKD, peripheral arterial disease, chronic bilateral osteomyelitis status post left transmetatarsal amputation, history of DVT in right upper extremity admitted for worsening bilateral lower extremity infections.  Right foot MRI with osteomyelitis of navicular and medial cuneiform.  Left foot MRI with posterior calcaneal osteomyelitis.  Vascular surgery and cardiology deemed pt not a revascularization candidate,  recommending bilateral AKAs.  ID has recommended discontinuation of antibiotics related to lower extremity osteomyelitis given lack of perfusion/antibiotic penetration due to vascular compromise.  Patient also had UTI on admission.  Pt currently not agreeable to amputation. Palliative consulted for goals of care/ advanced care planning.     -At time of initial encounter, pt resting in bed, recently completed PT/OT session.  Pt reports feeling well overall.  Pt lives at home with his 2 siblings who assist with his care when they are home.  Pt is not  and has no children. Pt is wheelchair bound but still able to move around the house and get out to the store.  Pt reports that he recently completed a 6 week course of abx at a SNF and has been home for about 2-3 weeks.  Pt reports having home wound care and home health services. He report that he is unsure of he legs are improving or worsening ans reports that he does not partake in any of his wound care.  -We discussed the recs of ID, cardiology, and general surgery.  Pt reports that he understands the risks and concerns associated with progression of osteomyelitis as well as the risks and options associated with amputation.   Pt reports that he is still considering his options.  He reports that his main opposition to amputation is that he would lose his remaining mobility. Pt has not been ambulatory is at least 6 months but is still able to transfer himself at home and  values this independence.  - Pt able to verbally express that without surgical intervention, there is a high likelihood that his life will be shortened. He also verbalizes that this is not what he wants at this time.  He again expresses that he is still weighing his options.  -Pt has muddled goals of care as he wants to focus on life prolonging interventions, but at the same time does not find interventions acceptable.  Pt places high priority on his independence as well.  Will continue to discuss goals of care and offer support.   -Establishes MPOA.  Pt chose his sister, January Nicholson, as his MPOA if he were unable to make his wishes known.  He did not elect to name a second choice.  Paper work completed and placed in the pts file to be scanned into his chart.   -Pt remains a full code.   -If pt were to forgo amputation, he would certainly be hospice qualified, but this does not align with pts stated goals at this time either.

## 2023-05-24 NOTE — ASSESSMENT & PLAN NOTE
- HR upper 30s to 40s with second degree AVB; HR up to 60s-70s this AM with activity  - asymptomatic and BP stable  - no indication for pacemaker as of now; high risk for progression to CHB but pacemaker placement likely to be problematic given chronic osteomyelitis and current BLE wounds

## 2023-05-24 NOTE — NURSING
"RAPID RESPONSE NURSE PROACTIVE ROUNDING NOTE     Time of Visit:     Admit Date: 2023  LOS: 4  Code Status: Full Code   Date of Visit: 2023  : 1950  Age: 73 y.o.  Sex: male  Race: Black or   Bed: K523/K523 A:   MRN: [id number]  Was the patient discharged from an ICU this admission? No   Was the patient discharged from a PACU within last 24 hours?  No  Did the patient receive conscious sedation/general anesthesia in last 24 hours?  No  Was the patient in the ED within the past 24 hours?  No  Was the patient started on NIPPV within the past 24 hours?  No  Attending Physician: Anna Mcdonnell MD  Primary Service: Networked reference to record PCT     ASSESSMENT     Notified by charge RN during rounding.  Reason for alert: asymptomatic bradycardia     Diagnosis: Chronic osteomyelitis    Abnormal Vital Signs: BP (!) 130/58   Pulse (!) 41   Temp 98 °F (36.7 °C)   Resp 18   Ht 5' 9" (1.753 m)   Wt 113.3 kg (249 lb 12.5 oz)   SpO2 97%   BMI 36.89 kg/m²      Clinical Issues: Dysrythmia    Patient  has a past medical history of Arthritis, Diabetes mellitus, Diabetes mellitus, type 2, Hyperlipidemia, Hypertension, and Osteomyelitis.      Patient awake lying in bed.  No complaints at this time.       INTERVENTIONS/ RECOMMENDATIONS     Pacer pads placed and dated on patient.      Discussed plan of care with RNLuly.    PHYSICIAN ESCALATION     Yes/No  No    Orders received and case discussed with NA.    Disposition: Remain in room 523.    FOLLOW-UP     Call back the Proactive Round NurseANTONIA at [phone] for additional questions or concerns.         "

## 2023-05-24 NOTE — PLAN OF CARE
Problem: Occupational Therapy  Goal: Occupational Therapy Goal  Description: Goals to be met by: 06/20/23     Patient will increase functional independence with ADLs by performing:    LE Dressing with Minimal Assistance.  Grooming while seated at sink with Modified Arrey.  Toileting from bedside commode with Minimal Assistance for hygiene and clothing management.   Toilet transfer to bedside commode with Stand-by Assistance and maintaining weight-bearing precaution(s).  Upper extremity exercise program 3x20 reps per handout, with supervision to improve func mobility skills.    Outcome: Ongoing, Progressing   Saji Castañeda is a 73 y.o. male with a medical diagnosis of Chronic osteomyelitis.  Performance deficits affecting function are weakness, impaired endurance, impaired self care skills, impaired functional mobility, gait instability, impaired balance, decreased upper extremity function, decreased lower extremity function, decreased safety awareness, pain, decreased ROM, impaired coordination, impaired fine motor, impaired skin, edema, orthopedic precautions.   Pt found in bed, agreeable to therapy.  Pt transferred EOB<>drop arm BSC with CGA.  Pt unable to maintain RLE WB on heel only during transfer and reports he will never be able to do it.  Pt is slowly progressing towards goals.  Continue OT services to address functional goals, progressing as able.

## 2023-05-24 NOTE — ASSESSMENT & PLAN NOTE
H/o chronic osteomyelitis s/p L TMA with severe underlying PAD  Vascular surgery at [facility] recommends bilateral AKA but patient adamantly declines     Cardiology consult -not a candidate for revascularization    Surgery consult.  Patient declined amputations until 5/23, but is thinking about it now. If patient agreeable, will need to discuss with surgery regarding amputation this admission vs outpatient.     Palliative care consult given non ambulatory status, with b/l leg wounds w/ chronic osteomyelitis not a candidate for revascularization & reluctance to get amputation.     Not on antibiotics per ID recommendations at [facility] given poor blood flow. Clinically stable at this time.    Wound care consult. Will place orders based on [facility] recommendations.

## 2023-05-24 NOTE — NURSING
RAPID RESPONSE NURSE PROACTIVE ROUNDING NOTE       Admit Date: 2023  LOS: 5  Code Status: Full Code   Date of Visit: 2023  : 1950  Age: 73 y.o.  Sex: male  Race: Black or   Bed: K523/K523 A:   MRN: [id number]  Was the patient discharged from an ICU this admission? Yes   Was the patient discharged from a PACU within last 24 hours? No   Did the patient receive conscious sedation/general anesthesia in last 24 hours? No   Was the patient in the ED within the past 24 hours? No   Was the patient on NIPPV within the past 24 hours? No   Attending Physician: Anna Mcdonnell MD  Primary Service: Hospitalist     SITUATION  Reason for alert: Rapid response List    Diagnosis: Chronic osteomyelitis   has a past medical history of Arthritis, Diabetes mellitus, Diabetes mellitus, type 2, Hyperlipidemia, Hypertension, and Osteomyelitis.    Last Vitals:  Temp: 98.7 °F (37.1 °C) (752)  Pulse: 51 (752)  Resp: 20 (752)  BP: 159/70 (752)  SpO2: 97 % (752)    24 Hour Vitals Range:  Temp:  [97.6 °F (36.4 °C)-98.7 °F (37.1 °C)]   Pulse:  [38-54]   Resp:  [18-20]   BP: (130-159)/(58-70)   SpO2:  [97 %-99 %]     Clinical Issues: Circulatory    INTERVENTIONS/RECOMMENDATIONS  Patient HR in the 50s-60s this am, pacer pads placed last night at 10pm, will need to replace pads tonight if needed, able to intervene if needed    Discussed plan of care with Charge RNSuzette    FOLLOW UP    Call back the Rapid Response Nurse, Yudi Ramirez at [phone] for additional questions or concerns.

## 2023-05-24 NOTE — PLAN OF CARE
Problem: Adult Inpatient Plan of Care  Goal: Plan of Care Review  Outcome: Ongoing, Progressing   Chart check complete. Vitals, orders, labs, and progress notes reviewed. Care plan updated.

## 2023-05-24 NOTE — CONSULTS
[facility] Surg  Palliative Medicine  Consult Note    Patient Name: Saji Castañeda  MRN: [id number]  Admission Date: 5/19/2023  Hospital Length of Stay: 5 days  Code Status: Full Code   Attending Provider: Anna Mcdonnell MD  Consulting Provider: Marisol Pizano NP  Primary Care Physician: [facility]  Principal Problem:Chronic osteomyelitis    Patient information was obtained from patient, past medical records and primary team.      Inpatient consult to Palliative Care  Consult performed by: Marisol Pizano NP  Consult ordered by: Anna Mcdonnell MD  Reason for consult: goals of care/ advanced care planning         Assessment/Plan:     Palliative Care  Palliative care encounter   Mr. Castañeda is a 73-y/oM with a pmx of type 2 diabetes mellitus, hypertension, CKD, peripheral arterial disease, chronic bilateral osteomyelitis status post left transmetatarsal amputation, history of DVT in right upper extremity admitted for worsening bilateral lower extremity infections.  Right foot MRI with osteomyelitis of navicular and medial cuneiform.  Left foot MRI with posterior calcaneal osteomyelitis.  Vascular surgery and cardiology deemed pt not a revascularization candidate,  recommending bilateral AKAs.  ID has recommended discontinuation of antibiotics related to lower extremity osteomyelitis given lack of perfusion/antibiotic penetration due to vascular compromise.  Patient also had UTI on admission.  Pt currently not agreeable to amputation. Palliative consulted for goals of care/ advanced care planning.     -At time of initial encounter, pt resting in bed, recently completed PT/OT session.  Pt reports feeling well overall.  Pt lives at home with his 2 siblings who assist with his care when they are home.  Pt is not  and has no children. Pt is wheelchair bound but still able to move around the house and get out to the store.  Pt reports that he recently completed a 6 week course of abx at a SNF and has  "been home for about 2-3 weeks.  Pt reports having home wound care and home health services. He report that he is unsure of he legs are improving or worsening ans reports that he does not partake in any of his wound care.  -We discussed the recs of ID, cardiology, and general surgery.  Pt reports that he understands the risks and concerns associated with progression of osteomyelitis as well as the risks and options associated with amputation.   Pt reports that he is still considering his options.  He reports that his main opposition to amputation is that he would lose his remaining mobility. Pt has not been ambulatory is at least 6 months but is still able to transfer himself at home and values this independence.  - Pt able to verbally express that without surgical intervention, there is a high likelihood that his life will be shortened. He also verbalizes that this is not what he wants at this time.  He again expresses that he is still weighing his options.  -Pt has muddled goals of care as he wants to focus on life prolonging interventions, but at the same time does not find interventions acceptable.  Pt places high priority on his independence as well.  Will continue to discuss goals of care and offer support.   -Establishes MPOA.  Pt chose his sister, January Nicholson, as his MPOA if he were unable to make his wishes known.  He did not elect to name a second choice.  Paper work completed and placed in the pts file to be scanned into his chart.   -Pt remains a full code.   -If pt were to forgo amputation, he would certainly be hospice qualified, but this does not align with pts stated goals at this time either.          Subjective:     HPI:   Per chart, " 73-year-old male with PMH of bilateral lower extremity chronic osteomyelitis s/p left foot metatarsal amputation, hypertension, CKD, RUE DVT on anticoagulation presented with worsening bilateral lower extremity wounds at [facility].  Imaging concerning " "for osteo.  Patient was seen by Podiatry, vascular surgery and Infectious Disease.  Vascular surgery recommended AKA given nonhealing wounds but patient declined.  Superficial wound cultures positive.  Blood cultures negative.  Infectious Disease recommended against antibiotics as compromised blood flow to lower extremities in setting of PAD would affect antibiotic delivery.  Urine cultures 5/15 grew Klebsiella pneumoniae for which she was started on ceftriaxone.  Hyperglycemic for which he was placed on IV insulin, since transitioned to basal/bolus insulin.  Per report, case was discussed with interventional Cardiology Dr. Barlow who agreed to evaluate for possible revascularization and recommended transfer to Hospital Medicine.    Patient had breakfast this morning and is currently on Eliquis for DVT.  He denies any new complaints during my encounter.  Vital signs stable.  He was transferred to ICU per transfer protocol.  Hemodynamically stable with no ICU needs.  Transfer orders to med/tele placed."    Interval History: Pt reports no acute events, denies any distress or pain.  Pt followed by cardiology, general surgery, PT/OT.     Past Medical History:   Diagnosis Date    Arthritis     legs    Diabetes mellitus     Diabetes mellitus, type 2     Hyperlipidemia     Hypertension     Osteomyelitis        Past Surgical History:   Procedure Laterality Date    ANGIOGRAPHY OF LOWER EXTREMITY N/A 2/3/2021    Procedure: Angiogram Extremity Bilateral;  Surgeon: Ernst Chacko MD;  Location: [facility] OR [address] [place] [address];  Service: Peripheral Vascular;  Laterality: N/A;  7.4 mintues fluoroscopy time  816.15 mGy  170.17 Gycm2    AORTOGRAPHY WITH EXTREMITY RUNOFF Bilateral 2/3/2021    Procedure: AORTOGRAM, WITH EXTREMITY RUNOFF;  Surgeon: Ernst Chacko MD;  Location: [facility] OR [address] [place] [address];  Service: Peripheral Vascular;  Laterality: Bilateral;    DEBRIDEMENT OF FOOT Left 2/23/2021    Procedure: DEBRIDEMENT, LEFT HEEL;  " Surgeon: Mayra Schroeder DPM;  Location: [facility] OR [address] [place] [address];  Service: Podiatry;  Laterality: Left;    FOOT AMPUTATION  October 2010    left high midfoot amputation       Review of patient's allergies indicates:  No Known Allergies    Medications:  Continuous Infusions:  Scheduled Meds:   atorvastatin  80 mg Oral Daily    clopidogreL  75 mg Oral Daily    collagenase   Topical (Top) Daily    insulin aspart U-100  12 Units Subcutaneous TIDWM    insulin detemir U-100  18 Units Subcutaneous BID    isosorbide dinitrate  10 mg Oral TID    lactulose  30 g Oral Once    losartan  100 mg Oral Daily    NIFEdipine  60 mg Oral Daily    pantoprazole  40 mg Oral Daily    polyethylene glycol  17 g Oral Daily    senna-docusate 8.6-50 mg  1 tablet Oral BID    tamsulosin  0.4 mg Oral Daily     PRN Meds:acetaminophen, dextrose 10%, dextrose 10%, dextrose, dextrose, glucagon (human recombinant), insulin aspart U-100, melatonin, naloxone, oxyCODONE, sodium chloride 0.9%    Family History       Problem Relation (Age of Onset)    Cancer Brother    Diabetes Mother, Sister    Heart disease Mother    Stroke Sister          Tobacco Use    Smoking status: Never    Smokeless tobacco: Never   Substance and Sexual Activity    Alcohol use: No     Comment: occassional    Drug use: No    Sexual activity: Not Currently       Review of Systems   Constitutional:  Positive for fatigue.   Respiratory: Negative.     Cardiovascular: Negative.    Skin:  Positive for wound.   Objective:     Vital Signs (Most Recent):  Temp: 97.9 °F (36.6 °C) (05/24/23 1151)  Pulse: (!) 39 (05/24/23 1223)  Resp: 18 (05/24/23 1151)  BP: (!) 140/65 (05/24/23 1151)  SpO2: 97 % (05/24/23 1151) Vital Signs (24h Range):  Temp:  [97.6 °F (36.4 °C)-98.7 °F (37.1 °C)] 97.9 °F (36.6 °C)  Pulse:  [38-51] 39  Resp:  [18-20] 18  SpO2:  [97 %-99 %] 97 %  BP: (130-159)/(58-70) 140/65     Weight: 113.3 kg (249 lb 12.5 oz)  Body mass index is 36.89 kg/m².       Physical  Exam  Vitals and nursing note reviewed.   Constitutional:       General: He is not in acute distress.     Appearance: He is obese. He is ill-appearing. He is not toxic-appearing.   HENT:      Head: Normocephalic.      Nose: Nose normal.      Mouth/Throat:      Mouth: Mucous membranes are moist.      Comments: Poor dentition   Cardiovascular:      Rate and Rhythm: Bradycardia present.   Pulmonary:      Effort: Pulmonary effort is normal.      Breath sounds: Normal breath sounds.   Abdominal:      General: Abdomen is flat.      Palpations: Abdomen is soft.   Skin:     General: Skin is warm and dry.      Findings: Wound present.      Comments: See photos    Neurological:      General: No focal deficit present.      Mental Status: He is alert and oriented to person, place, and time.   Psychiatric:         Mood and Affect: Mood normal.         Behavior: Behavior normal.         Thought Content: Thought content normal.         Judgment: Judgment normal.          Review of Symptoms      Symptom Assessment (ESAS 0-10 Scale)  Pain:  0  Dyspnea:  0  Anxiety:  3  Nausea:  0  Depression:  0  Anorexia:  0  Fatigue:  3  Insomnia:  0  Restlessness:  0  Agitation:  0         Performance Status:  40    Living Arrangements:  Lives in home and Lives with family    Psychosocial/Cultural:   See Palliative Psychosocial Note: No  Social Issues Identified: Coping deficit pt/family and New Diagnosis/Trauma  Bereavement Risk: No  Caregiver Needs Discussed. Caregiver Distress: Yes: Intensity of family caregiving and Caregiver Burnout Risk  Cultural: none identified   **Primary  to Follow**  Palliative Care  Consult: No    Spiritual:  F - K[place]y and Belief:  Congregation   I - Importance:  Moderate   C - Community:  Family support   A - Address in Care:  Pastoral visits prn      Time-Based Charting:  Yes  Chart Review: 25 minutes  Face to Face: 19 minutes  Symptom Assessment: 11 minutes  Coordination of Care: 15  minutes  Discharge Plannin minutes  Advance Care Plannin minutes  Goals of Care: 8 minutes    Total Time Spent: 91 minutes      Advance Care Planning   Advance Directives:   Living Will: No    LaPOST: No    Do Not Resuscitate Status: No    Medical Power of : Yes    Agent's Name:  January Koch   Agent's Contact Number:  [phone]    Decision Making:  Patient answered questions  Goals of Care: The patient endorses that what is most important right now is to focus on avoiding the hospital and remaining as independent as possible    Accordingly, we have decided that the best plan to meet the patient's goals includes continuing with treatment and considering all options.        Significant Labs: All pertinent labs within the past 24 hours have been reviewed.  CBC:   Recent Labs   Lab 23   WBC 7.92   HGB 8.1*   HCT 26.3*   MCV 85        BMP:  Recent Labs   Lab 23   *   *   K 4.4      CO2 28   BUN 33*   CREATININE 1.4   CALCIUM 8.8     LFT:  Lab Results   Component Value Date    AST 14 05/15/2023    ALKPHOS 73 05/15/2023    BILITOT 0.3 05/15/2023     Albumin:   Albumin   Date Value Ref Range Status   05/15/2023 2.7 (L) 3.5 - 5.2 g/dL Final     Protein:   Total Protein   Date Value Ref Range Status   05/15/2023 7.8 6.0 - 8.4 g/dL Final     Lactic acid:   Lab Results   Component Value Date    LACTATE 1.8 05/15/2023    LACTATE 1.5 2023       Significant Imaging: I have reviewed all pertinent imaging results/findings within the past 24 hours.      Marisol Pizano NP  Palliative Medicine  [facility] Surg    Advance Care Planning     Date: 2023    Power of   I initiated the process of advance care planning today and explained the importance of this process to the patient.  I introduced the concept of advance directives to the patient, as well. Then the patient received detailed information about the importance of designating a Health  Care Power of  (HCPOA). He was also instructed to communicate with this person about their wishes for future healthcare, should he become sick and lose decision-making capacity. The patient has not previously appointed a HCPOA. After our discussion, the patient has decided to complete a HCPOA and has appointed his sister, health care agent: January Nicholson [phone] &  I spent a total time of 16 minutes discussing this issue with the patient.

## 2023-05-24 NOTE — PLAN OF CARE
12:43 pm SW contacted pts sister Kandy Castañeda [phone] and line was busy. QUINCY unable to leave voice message.        05/24/23 1243   Post-Acute Status   Post-Acute Authorization Other

## 2023-05-24 NOTE — ASSESSMENT & PLAN NOTE
Bradycardic with intermittent AV dissociation. Asymptomatic. BP stable  Not on beta blocker. Avoid AV tj blocking agent  Telemetry     Cardiology consult  No indication for a pacemaker at this time. May not be a good candidate in case PPM is indicated given chronic osteo and b/l foot wounds

## 2023-05-24 NOTE — PROGRESS NOTES
"Surgery follow up  BP (!) 140/65   Pulse (!) 39   Temp 97.9 °F (36.6 °C)   Resp 18   Ht 5' 9" (1.753 m)   Wt 113.3 kg (249 lb 12.5 oz)   SpO2 97%   BMI 36.89 kg/m²   I/O last 3 completed shifts:  In: -   Out: 600 [Urine:600]  No intake/output data recorded.    Recent Results (from the past 336 hour(s))   CBC with Automated Differential    Collection Time: 05/24/23  3:53 AM   Result Value Ref Range    WBC 7.92 3.90 - 12.70 K/uL    Hemoglobin 8.1 (L) 14.0 - 18.0 g/dL    Hematocrit 26.3 (L) 40.0 - 54.0 %    Platelets 339 150 - 450 K/uL   CBC with Automated Differential    Collection Time: 05/22/23  3:47 AM   Result Value Ref Range    WBC 6.84 3.90 - 12.70 K/uL    Hemoglobin 8.1 (L) 14.0 - 18.0 g/dL    Hematocrit 25.7 (L) 40.0 - 54.0 %    Platelets 330 150 - 450 K/uL   CBC with Automated Differential    Collection Time: 05/20/23  3:53 AM   Result Value Ref Range    WBC 6.64 3.90 - 12.70 K/uL    Hemoglobin 7.9 (L) 14.0 - 18.0 g/dL    Hematocrit 25.5 (L) 40.0 - 54.0 %    Platelets 339 150 - 450 K/uL     Recent Results (from the past 336 hour(s))   Basic Metabolic Panel (BMP)    Collection Time: 05/24/23  3:53 AM   Result Value Ref Range    Sodium 135 (L) 136 - 145 mmol/L    Potassium 4.4 3.5 - 5.1 mmol/L    Chloride 101 95 - 110 mmol/L    CO2 28 23 - 29 mmol/L    BUN 33 (H) 8 - 23 mg/dL    Creatinine 1.4 0.5 - 1.4 mg/dL    Calcium 8.8 8.7 - 10.5 mg/dL    Anion Gap 6 (L) 8 - 16 mmol/L   Basic Metabolic Panel (BMP)    Collection Time: 05/22/23  3:47 AM   Result Value Ref Range    Sodium 138 136 - 145 mmol/L    Potassium 4.2 3.5 - 5.1 mmol/L    Chloride 102 95 - 110 mmol/L    CO2 26 23 - 29 mmol/L    BUN 34 (H) 8 - 23 mg/dL    Creatinine 1.4 0.5 - 1.4 mg/dL    Calcium 8.9 8.7 - 10.5 mg/dL    Anion Gap 10 8 - 16 mmol/L   Basic Metabolic Panel (BMP)    Collection Time: 05/20/23  3:53 AM   Result Value Ref Range    Sodium 137 136 - 145 mmol/L    Potassium 4.1 3.5 - 5.1 mmol/L    Chloride 102 95 - 110 mmol/L    CO2 27 23 " - 29 mmol/L    BUN 20 8 - 23 mg/dL    Creatinine 1.1 0.5 - 1.4 mg/dL    Calcium 8.9 8.7 - 10.5 mg/dL    Anion Gap 8 8 - 16 mmol/L   Still thinking about surgery .

## 2023-05-24 NOTE — PROGRESS NOTES
[facility] Medicine  Progress Note    Patient Name: Saji Castañeda  MRN: [id number]  Patient Class: IP- Inpatient   Admission Date: 5/19/2023  Length of Stay: 5 days  Attending Physician: Anna Mcdonnell MD  Primary Care Provider: [facility]        Subjective:     Principal Problem:Chronic osteomyelitis    HPI:  73-year-old male with PMH of bilateral lower extremity chronic osteomyelitis s/p left foot metatarsal amputation, hypertension, CKD, RUE DVT on anticoagulation presented with worsening bilateral lower extremity wounds at [facility].  Imaging concerning for osteo.  Patient was seen by Podiatry, vascular surgery and Infectious Disease.  Vascular surgery recommended AKA given nonhealing wounds but patient declined.  Superficial wound cultures positive.  Blood cultures negative.  Infectious Disease recommended against antibiotics as compromised blood flow to lower extremities in setting of PAD would affect antibiotic delivery.  Urine cultures 5/15 grew Klebsiella pneumoniae for which she was started on ceftriaxone.  Hyperglycemic for which he was placed on IV insulin, since transitioned to basal/bolus insulin.  Per report, case was discussed with interventional Cardiology Dr. Barlow who agreed to evaluate for possible revascularization and recommended transfer to Hospital Medicine.    Patient had breakfast this morning and is currently on Eliquis for DVT.  He denies any new complaints during my encounter.  Vital signs stable.  He was transferred to ICU per transfer protocol.  Hemodynamically stable with no ICU needs.  Transfer orders to [facility]/tele placed.      Overview/Hospital Course:  No notes on file    Interval History:  no acute ON events. No new complains. He declined amputation yesterday but would like to speak to the surgeon today. He is re-thinking his decision on amputation.   Palliative care consult pending    Review of Systems  Objective:     Vital Signs (Most  Recent):  Temp: 97.9 °F (36.6 °C) (05/24/23 1151)  Pulse: (!) 39 (05/24/23 1223)  Resp: 18 (05/24/23 1151)  BP: (!) 140/65 (05/24/23 1151)  SpO2: 97 % (05/24/23 1151) Vital Signs (24h Range):  Temp:  [97.6 °F (36.4 °C)-98.7 °F (37.1 °C)] 97.9 °F (36.6 °C)  Pulse:  [38-51] 39  Resp:  [18-20] 18  SpO2:  [97 %-99 %] 97 %  BP: (130-159)/(58-70) 140/65     Weight: 113.3 kg (249 lb 12.5 oz)  Body mass index is 36.89 kg/m².  No intake or output data in the 24 hours ending 05/24/23 1252        Physical Exam  Vitals and nursing note reviewed.   Constitutional:       General: He is not in acute distress.     Appearance: He is well-developed.   Cardiovascular:      Rate and Rhythm: Regular rhythm. Bradycardia present.      Heart sounds: Normal heart sounds. No murmur heard.  Pulmonary:      Effort: Pulmonary effort is normal. No respiratory distress.      Breath sounds: Normal breath sounds.   Abdominal:      Palpations: Abdomen is soft.      Tenderness: There is no abdominal tenderness.   Musculoskeletal:      Right lower leg: Edema present.      Left lower leg: Edema present.      Comments: Bilateral LE wrapped, s/p L TMA   Skin:     Comments: B/l LE chronic skin wounds   Neurological:      Mental Status: He is alert and oriented to person, place, and time.           Significant Labs: All pertinent labs within the past 24 hours have been reviewed.    Significant Imaging: I have reviewed all pertinent imaging results/findings within the past 24 hours.      Assessment/Plan:      * Chronic osteomyelitis  H/o chronic osteomyelitis s/p L TMA with severe underlying PAD  Vascular surgery at [facility] recommends bilateral AKA but patient adamantly declines     Cardiology consult -not a candidate for revascularization    Surgery consult.  Patient declined amputations until 5/23, but is thinking about it now. If patient agreeable, will need to discuss with surgery regarding amputation this admission vs outpatient.     Palliative  care consult given non ambulatory status, with b/l leg wounds w/ chronic osteomyelitis not a candidate for revascularization & reluctance to get amputation.     Not on antibiotics per ID recommendations at [facility] given poor blood flow. Clinically stable at this time.    Wound care consult. Will place orders based on [facility] recommendations.         UTI (urinary tract infection)  Urine culture 5/15 growing Klebseila    On ceftriaxone to complete 7 days       Goals of care, counseling/discussion  Advance Care Planning     Code Status  In light of the patient's medical co-morbidities, I engaged the patient in a conversation about the patient's preferences for care at the very end of life. The patient wishes to have aggressive measures including CPR, intubation. I communicated to the patient that a full code order will be placed in the chart.  I spent a total of 5 minutes engaging the patient in this advance care planning discussion.    Does not have significant other or children. Lives with siblings and would like his siblings specially the sister younger to him Meliza to make medical decisions for him if he is unable to.          Chronic deep vein thrombosis (DVT) of right upper extremity  On eliquis      PVD (peripheral vascular disease)  As above      Asymptomatic Mobitz (type) I (Wenckebach's) atrioventricular block  Bradycardic with intermittent AV dissociation. Asymptomatic. BP stable  Not on beta blocker. Avoid AV tj blocking agent  Telemetry     Cardiology consult  No indication for a pacemaker at this time. May not be a good candidate in case PPM is indicated given chronic osteo and b/l foot wounds      Insulin dependent type 2 diabetes mellitus  Patient's FSGs are controlled on current medication regimen.  Last A1c reviewed-   Lab Results   Component Value Date    HGBA1C 8.4 (H) 03/10/2023     Most recent fingerstick glucose reviewed-   Recent Labs   Lab 05/23/23 1925 05/23/23  9529  05/24/23  0421 05/24/23  1153   POCTGLUCOSE 179* 151* 170* 233*     Current correctional scale  Medium  Decrease anti-hyperglycemic dose as follows-   Antihyperglycemics (From admission, onward)    Start     Stop Route Frequency Ordered    05/20/23 1130  insulin aspart U-100 pen 12 Units         -- SubQ 3 times daily with meals 05/20/23 0805    05/20/23 0900  insulin detemir U-100 (Levemir) pen 18 Units         -- SubQ 2 times daily 05/20/23 0805    05/19/23 1138  insulin aspart U-100 pen 1-10 Units         -- SubQ Before meals & nightly PRN 05/19/23 1140        Hold Oral hypoglycemics while patient is in the hospital.    Essential hypertension  Stable  Continue home meds      VTE Risk Mitigation (From admission, onward)    None          Discharge Planning   OXANA:      Code Status: Full Code   Is the patient medically ready for discharge?:     Reason for patient still in hospital (select all that apply): Consult recommendations           Anna Mcdonnell MD  Department of Hospital Medicine   [facility]

## 2023-05-24 NOTE — PT/OT/SLP PROGRESS
Occupational Therapy   Treatment    Name: Saji Castañeda  MRN: [id number]  Admitting Diagnosis:  Chronic osteomyelitis       Recommendations:     Discharge Recommendations: LTACH (long-term acute care hospital)  Discharge Equipment Recommendations:  other (see comments) (drop arm BSC)  Barriers to discharge:  Inaccessible home environment, Decreased caregiver support, Other (Comment) (Increased level of assistance)    Assessment:     Saji Castañeda is a 73 y.o. male with a medical diagnosis of Chronic osteomyelitis.  Performance deficits affecting function are weakness, impaired endurance, impaired self care skills, impaired functional mobility, gait instability, impaired balance, decreased upper extremity function, decreased lower extremity function, decreased safety awareness, pain, decreased ROM, impaired coordination, impaired fine motor, impaired skin, edema, orthopedic precautions. Pt found in bed, agreeable to therapy.  Pt transferred EOB<>drop arm BSC with CGA.  Pt unable to maintain RLE WB on heel only during transfer and reports he will never be able to do it.  Pt is slowly progressing towards goals.  Continue OT services to address functional goals, progressing as able.      Rehab Prognosis:  Good; patient would benefit from acute skilled OT services to address these deficits and reach maximum level of function.       Plan:     Patient to be seen 3 x/week to address the above listed problems via self-care/home management, therapeutic activities, therapeutic exercises  Plan of Care Expires: 06/19/23  Plan of Care Reviewed with: patient    Subjective     Chief Complaint: It is impossible for me to not put weight on my leg.  Patient/Family Comments/goals: to have BM  Pain/Comfort:  Pain Rating 1: 0/10  Pain Rating Post-Intervention 1: 0/10    Objective:     Communicated with: RN prior to session.  Patient found HOB elevated with bed alarm, Condom Catheter, peripheral IV, telemetry upon OT entry to  room.    General Precautions: Standard, contact, fall    Orthopedic Precautions:LLE non weight bearing  Braces:  (R Darco)  Respiratory Status: Room air     Occupational Performance:     Bed Mobility:    Patient completed Rolling/Turning to Left with  modified independence  Patient completed Rolling/Turning to Right with modified independence  Patient completed Scooting/Bridging with modified independence  Patient completed Supine to Sit with modified independence  Patient completed Sit to Supine with modified independence     Functional Mobility/Transfers: *Unable to maintain wt bearing precautions during transfers  Patient completed Sit <> Stand Transfer with contact guard assistance  with  no assistive device.   Patient completed Toilet Transfer Squat Pivot technique with contact guard assistance with  drop arm commode  Functional Mobility: Unable     Activities of Daily Living:  Toileting: maximal assistance for hygiene after BM seated on BSC.  Pt attempting to stand for cleaning despite instruction to sit in order to maintain wt bear precautions.        [facility] 6 Click ADL: 17    Treatment & Education:  Pt provided with, instructed on, and performed BUE red theraband exercises 2 x 10 reps for shld flex, abd, hori abd/add, bicep curls and tricep extensions.  Pt tolerated well with short rest breaks secondary to muscle fatigue and SOB.       Patient left up in chair with all lines intact, call button in reach, bed alarm on, and RN notified    GOALS:   Multidisciplinary Problems       Occupational Therapy Goals          Problem: Occupational Therapy    Goal Priority Disciplines Outcome Interventions   Occupational Therapy Goal     OT, PT/OT Ongoing, Progressing    Description: Goals to be met by: 06/20/23     Patient will increase functional independence with ADLs by performing:    LE Dressing with Minimal Assistance.  Grooming while seated at sink with Modified [place].  Toileting from bedside commode with  Minimal Assistance for hygiene and clothing management.   Toilet transfer to bedside commode with Stand-by Assistance and maintaining weight-bearing precaution(s).  Upper extremity exercise program 3x20 reps per handout, with supervision to improve [facility] mobility skills.                         Time Tracking:     OT Date of Treatment: 05/24/23  OT Start Time: 1419  OT Stop Time: 1459  OT Total Time (min): 40 min    Billable Minutes:Self Care/Home Management 15  Therapeutic Activity 25               5/24/2023

## 2023-05-24 NOTE — PT/OT/SLP PROGRESS
Physical Therapy Treatment    Patient Name:  Saji Castañeda   MRN:  [id number]    Recommendations:     Discharge Recommendations: LTACH (long-term acute care hospital)  Discharge Equipment Recommendations:  (TBD)  Barriers to discharge:  decreased mobility,strength and endurance    Assessment:     Saji Castañeda is a 73 y.o. male admitted with a medical diagnosis of Chronic osteomyelitis.  He presents with the following impairments/functional limitations: weakness, impaired endurance, impaired functional mobility, impaired balance, decreased upper extremity function, decreased lower extremity function, decreased safety awareness, decreased ROM, impaired coordination, impaired skin, impaired joint extensibility, orthopedic precautions,pt with good participation and requires assistance with all mobility at this time and is unable to maintain PWB at R heel in standing(Darco shoe),pt will benefit from LTACH services upon discharge.    Rehab Prognosis: Fair; patient would benefit from acute skilled PT services to address these deficits and reach maximum level of function.    Recent Surgery: * No surgery found *      Plan:     During this hospitalization, patient to be seen 5 x/week to address the identified rehab impairments via therapeutic activities, therapeutic exercises, neuromuscular re-education and progress toward the following goals:    Plan of Care Expires:  06/20/23    Subjective     Chief Complaint: n/a  Patient/Family Comments/goals: pt agreeable to EOB sitting.  Pain/Comfort:  Pain Rating 1:  (no rating)  Location - Side 1: Bilateral  Location - Orientation 1: lower  Location 1:  (legs)  Pain Addressed 1: Reposition, Distraction      Objective:     Communicated with nsg prior to session.  Patient found supine with bed alarm, fung catheter, pressure relief boots, peripheral IV upon PT entry to room.     General Precautions: Standard, contact, fall  Orthopedic Precautions: LLE non weight bearing (PWB on R with  Darco)  Braces:  (R Darco shoe)  Respiratory Status: Room air     Functional Mobility:  Bed Mobility:     Supine to Sit: supervision  Sit to Supine: stand by assistance  Balance: good sitting balance      AM-PAC 6 CLICK MOBILITY  Turning over in bed (including adjusting bedclothes, sheets and blankets)?: 3  Sitting down on and standing up from a chair with arms (e.g., wheelchair, bedside commode, etc.): 2  Moving from lying on back to sitting on the side of the bed?: 3  Moving to and from a bed to a chair (including a wheelchair)?: 2  Need to walk in hospital room?: 1  Climbing 3-5 steps with a railing?: 1  Basic Mobility Total Score: 12       Treatment & Education: pt sat EOB X ~14 mins with S,le seated ex's X 15-20 reps inc: laq's,hip flex and abd/add.      Patient left supine with all lines intact, call button in reach, nsg  notified, and pressure relief boots donned ..    GOALS: see general POC  Multidisciplinary Problems       Physical Therapy Goals          Problem: Physical Therapy    Goal Priority Disciplines Outcome Goal Variances Interventions   Physical Therapy Goal     PT, PT/OT Ongoing, Progressing     Description: Goals to be met by: 23     Patient will increase functional independence with mobility by performin. Supine to sit with Modified [place]  2. Sit to supine with Modified [place]  3. Bed to chair transfer with Supervision  squat pivot or using sliding board   4. Wheelchair propulsion x 100 feet with Modified [place] using bilateral uppper extremities                         Time Tracking:     PT Received On: 23  PT Start Time: 927     PT Stop Time: 953  PT Total Time (min): 26 min     Billable Minutes: Therapeutic Activity 16 and Therapeutic Exercise 10    Treatment Type: Treatment  PT/PTA: PTA     Number of PTA visits since last PT visit: 2     2023

## 2023-05-24 NOTE — SUBJECTIVE & OBJECTIVE
Interval History: Pt reports no acute events, denies any distress or pain.  Pt followed by cardiology, general surgery, PT/OT.     Past Medical History:   Diagnosis Date    Arthritis     legs    Diabetes mellitus     Diabetes mellitus, type 2     Hyperlipidemia     Hypertension     Osteomyelitis        Past Surgical History:   Procedure Laterality Date    ANGIOGRAPHY OF LOWER EXTREMITY N/A 2/3/2021    Procedure: Angiogram Extremity Bilateral;  Surgeon: Ernst Chacko MD;  Location: [facility] OR [address] [place] [address];  Service: Peripheral Vascular;  Laterality: N/A;  7.4 mintues fluoroscopy time  816.15 mGy  170.17 Gycm2    AORTOGRAPHY WITH EXTREMITY RUNOFF Bilateral 2/3/2021    Procedure: AORTOGRAM, WITH EXTREMITY RUNOFF;  Surgeon: Ernst Chacko MD;  Location: [facility] OR [address] [place] [address];  Service: Peripheral Vascular;  Laterality: Bilateral;    DEBRIDEMENT OF FOOT Left 2/23/2021    Procedure: DEBRIDEMENT, LEFT HEEL;  Surgeon: Mayra Schroeder DPM;  Location: [facility] OR [address] [place] [address];  Service: Podiatry;  Laterality: Left;    FOOT AMPUTATION  October 2010    left high midfoot amputation       Review of patient's allergies indicates:  No Known Allergies    Medications:  Continuous Infusions:  Scheduled Meds:   atorvastatin  80 mg Oral Daily    clopidogreL  75 mg Oral Daily    collagenase   Topical (Top) Daily    insulin aspart U-100  12 Units Subcutaneous TIDWM    insulin detemir U-100  18 Units Subcutaneous BID    isosorbide dinitrate  10 mg Oral TID    lactulose  30 g Oral Once    losartan  100 mg Oral Daily    NIFEdipine  60 mg Oral Daily    pantoprazole  40 mg Oral Daily    polyethylene glycol  17 g Oral Daily    senna-docusate 8.6-50 mg  1 tablet Oral BID    tamsulosin  0.4 mg Oral Daily     PRN Meds:acetaminophen, dextrose 10%, dextrose 10%, dextrose, dextrose, glucagon (human recombinant), insulin aspart U-100, melatonin, naloxone, oxyCODONE, sodium chloride 0.9%    Family History       Problem Relation (Age of Onset)    Cancer  Brother    Diabetes Mother, Sister    Heart disease Mother    Stroke Sister          Tobacco Use    Smoking status: Never    Smokeless tobacco: Never   Substance and Sexual Activity    Alcohol use: No     Comment: occassional    Drug use: No    Sexual activity: Not Currently       Review of Systems   Constitutional:  Positive for fatigue.   Respiratory: Negative.     Cardiovascular: Negative.    Skin:  Positive for wound.   Objective:     Vital Signs (Most Recent):  Temp: 97.9 °F (36.6 °C) (05/24/23 1151)  Pulse: (!) 39 (05/24/23 1223)  Resp: 18 (05/24/23 1151)  BP: (!) 140/65 (05/24/23 1151)  SpO2: 97 % (05/24/23 1151) Vital Signs (24h Range):  Temp:  [97.6 °F (36.4 °C)-98.7 °F (37.1 °C)] 97.9 °F (36.6 °C)  Pulse:  [38-51] 39  Resp:  [18-20] 18  SpO2:  [97 %-99 %] 97 %  BP: (130-159)/(58-70) 140/65     Weight: 113.3 kg (249 lb 12.5 oz)  Body mass index is 36.89 kg/m².       Physical Exam  Vitals and nursing note reviewed.   Constitutional:       General: He is not in acute distress.     Appearance: He is obese. He is ill-appearing. He is not toxic-appearing.   HENT:      Head: Normocephalic.      Nose: Nose normal.      Mouth/Throat:      Mouth: Mucous membranes are moist.      Comments: Poor dentition   Cardiovascular:      Rate and Rhythm: Bradycardia present.   Pulmonary:      Effort: Pulmonary effort is normal.      Breath sounds: Normal breath sounds.   Abdominal:      General: Abdomen is flat.      Palpations: Abdomen is soft.   Skin:     General: Skin is warm and dry.      Findings: Wound present.      Comments: See photos    Neurological:      General: No focal deficit present.      Mental Status: He is alert and oriented to person, place, and time.   Psychiatric:         Mood and Affect: Mood normal.         Behavior: Behavior normal.         Thought Content: Thought content normal.         Judgment: Judgment normal.          Review of Symptoms      Symptom Assessment (ESAS 0-10 Scale)  Pain:  0  Dyspnea:   0  Anxiety:  3  Nausea:  0  Depression:  0  Anorexia:  0  Fatigue:  3  Insomnia:  0  Restlessness:  0  Agitation:  0         Performance Status:  40    Living Arrangements:  Lives in home and Lives with family    Psychosocial/Cultural:   See Palliative Psychosocial Note: No  Social Issues Identified: Coping deficit pt/family and New Diagnosis/Trauma  Bereavement Risk: No  Caregiver Needs Discussed. Caregiver Distress: Yes: Intensity of family caregiving and Caregiver Burnout Risk  Cultural: none identified   **Primary  to Follow**  Palliative Care  Consult: No    Spiritual:  F - [place] and Belief:  Rastafari   I - Importance:  Moderate   C - Community:  Family support   A - Address in Care:  Pastoral visits prn      Time-Based Charting:  Yes  Chart Review: 25 minutes  Face to Face: 19 minutes  Symptom Assessment: 11 minutes  Coordination of Care: 15 minutes  Discharge Plannin minutes  Advance Care Plannin minutes  Goals of Care: 8 minutes    Total Time Spent: 91 minutes      Advance Care Planning   Advance Directives:   Living Will: No    [place]: No    Do Not Resuscitate Status: No    Medical Power of : Yes    Agent's Name:  January Koch   Agent's Contact Number:  [phone]    Decision Making:  Patient answered questions  Goals of Care: The patient endorses that what is most important right now is to focus on avoiding the hospital and remaining as independent as possible    Accordingly, we have decided that the best plan to meet the patient's goals includes continuing with treatment and considering all options.        Significant Labs: All pertinent labs within the past 24 hours have been reviewed.  CBC:   Recent Labs   Lab 23  0353   WBC 7.92   HGB 8.1*   HCT 26.3*   MCV 85        BMP:  Recent Labs   Lab 23  0353   *   *   K 4.4      CO2 28   BUN 33*   CREATININE 1.4   CALCIUM 8.8     LFT:  Lab Results   Component Value Date    AST  14 05/15/2023    ALKPHOS 73 05/15/2023    BILITOT 0.3 05/15/2023     Albumin:   Albumin   Date Value Ref Range Status   05/15/2023 2.7 (L) 3.5 - 5.2 g/dL Final     Protein:   Total Protein   Date Value Ref Range Status   05/15/2023 7.8 6.0 - 8.4 g/dL Final     Lactic acid:   Lab Results   Component Value Date    LACTATE 1.8 05/15/2023    LACTATE 1.5 03/09/2023       Significant Imaging: I have reviewed all pertinent imaging results/findings within the past 24 hours.

## 2023-05-24 NOTE — SUBJECTIVE & OBJECTIVE
Review of Systems   Constitutional: Negative for chills, decreased appetite, diaphoresis and fever.   Cardiovascular:  Negative for chest pain, claudication, cyanosis, dyspnea on exertion, irregular heartbeat, leg swelling, near-syncope, orthopnea, palpitations, paroxysmal nocturnal dyspnea and syncope.   Respiratory:  Negative for cough, hemoptysis, shortness of breath and wheezing.    Skin:  Positive for poor wound healing.   Gastrointestinal:  Negative for bloating, abdominal pain, constipation, diarrhea, melena, nausea and vomiting.   Neurological:  Negative for dizziness and weakness.   Objective:     Vital Signs (Most Recent):  Temp: 97.9 °F (36.6 °C) (05/24/23 1151)  Pulse: (!) 39 (05/24/23 1223)  Resp: 18 (05/24/23 1151)  BP: (!) 140/65 (05/24/23 1151)  SpO2: 97 % (05/24/23 1151) Vital Signs (24h Range):  Temp:  [97.6 °F (36.4 °C)-98.7 °F (37.1 °C)] 97.9 °F (36.6 °C)  Pulse:  [38-51] 39  Resp:  [18-20] 18  SpO2:  [97 %-99 %] 97 %  BP: (130-159)/(58-70) 140/65     Weight: 113.3 kg (249 lb 12.5 oz)  Body mass index is 36.89 kg/m².     SpO2: 97 %       No intake or output data in the 24 hours ending 05/24/23 1231    Lines/Drains/Airways       Drain  Duration             Female External Urinary Catheter 05/16/23 1730 7 days              Peripheral Intravenous Line  Duration                  Peripheral IV - Single Lumen 05/15/23 1135 20 G Right Antecubital 9 days         Peripheral IV - Single Lumen 05/16/23 0000 22 G Anterior;Left Forearm 8 days                       Physical Exam  Constitutional:       General: He is not in acute distress.     Appearance: He is well-developed.   Cardiovascular:      Rate and Rhythm: Normal rate and regular rhythm.      Heart sounds: No murmur heard.    No gallop.   Pulmonary:      Effort: Pulmonary effort is normal. No respiratory distress.      Breath sounds: Normal breath sounds. No wheezing.   Abdominal:      General: Bowel sounds are normal. There is no distension.       "Palpations: Abdomen is soft.      Tenderness: There is no abdominal tenderness.   Skin:     General: Skin is warm and dry.   Neurological:      Mental Status: He is alert and oriented to person, place, and time.          Significant Labs: BMP:   Recent Labs   Lab 05/24/23  0353   *   *   K 4.4      CO2 28   BUN 33*   CREATININE 1.4   CALCIUM 8.8    and CBC   Recent Labs   Lab 05/24/23  0353   WBC 7.92   HGB 8.1*   HCT 26.3*          Significant Imaging: Echocardiogram: Transthoracic echo (TTE) complete (Cupid Only):   Results for orders placed or performed during the hospital encounter of 03/09/23   Echo   Result Value Ref Range    BSA 2.45 m2    TDI SEPTAL 0.13 m/s    LV LATERAL E/E' RATIO 9.33 m/s    LV SEPTAL E/E' RATIO 8.62 m/s    IVC diameter 2.4 cm    Left Ventricular Outflow Tract Mean Velocity 0.67 cm/s    Left Ventricular Outflow Tract Mean Gradient 2.08 mmHg    TDI LATERAL 0.12 m/s    LVIDd 5.29 3.5 - 6.0 cm    IVS 1.44 (A) 0.6 - 1.1 cm    Posterior Wall 1.41 (A) 0.6 - 1.1 cm    LVIDs 3.30 2.1 - 4.0 cm    FS 38 28 - 44 %    LV mass 326.18 g    LA size 3.81 cm    Left Ventricle Relative Wall Thickness 0.53 cm    AV mean gradient 4 mmHg    AV valve area 2.44 cm2    AV Velocity Ratio 0.71     AV index (prosthetic) 0.65     MV mean gradient 2 mmHg    MV valve area p 1/2 method 3.53 cm2    MV valve area by continuity eq 2.45 cm2    E/A ratio 1.23     Mean e' 0.13 m/s    E wave deceleration time 212.48 msec    IVRT 138.92 msec    MV "A" wave duration [id number] msec    Pulm vein S/D ratio 1.76     LVOT diameter 2.18 cm    LVOT area 3.7 cm2    LVOT peak dl 0.97 m/s    LVOT peak VTI 22.00 cm    Ao peak dl 1.36 m/s    Ao VTI 33.6 cm    Mr max dl 2.36 m/s    LVOT stroke volume 82.07 cm3    AV peak gradient 7 mmHg    MV peak gradient 5 mmHg    E/E' ratio 8.96 m/s    MV Peak E Dl 1.12 m/s    TR Max Dl 2.64 m/s    MV VTI 33.5 cm    MV stenosis pressure 1/2 time 62.28 ms    MV " Peak A Dl 0.91 m/s    PV Peak S Dl 0.37 m/s    PV Peak D Dl 0.21 m/s    LV Systolic Volume 44.08 mL    LV Systolic Volume Index 18.8 mL/m2    LV Diastolic Volume 134.78 mL    LV Diastolic Volume Index 57.35 mL/m2    LV Mass Index 139 g/m2    RA Major Axis 5.63 cm    Left Atrium Minor Axis 6.53 cm    Left Atrium Major Axis 7.50 cm    Triscuspid Valve Regurgitation Peak Gradient 28 mmHg    LA Volume Index (Mod) 48.1 mL/m2    LA volume (mod) 112.99 cm3    Right Atrial Pressure (from IVC) 8 mmHg    EF 70 %    Sinus 3.50 cm    TV rest pulmonary artery pressure 36 mmHg    Narrative    · The left ventricle is mildly enlarged with concentric hypertrophy and   normal systolic function.  · The estimated ejection fraction is 70%.  · Normal left ventricular diastolic function.  · Normal right ventricular size with normal right ventricular systolic   function.  · Severe left atrial enlargement.  · Intermediate central venous pressure (8 mmHg).  · The estimated PA systolic pressure is 36 mmHg.  · Small pericardial effusion.  · Valves not well visualized.

## 2023-05-24 NOTE — ASSESSMENT & PLAN NOTE
- long standing wounds with skin graft to left TMA in [id number]; LE angio in 2014 with patent left CFA, SFA, PFA with 3V run off on LE angiogram by Vascular surgery  - CTA LE with run off 6/2022 with moderate to high grade disease of right popliteal with severely diseased AT, PT, peroneal and multiple occlusion of left popliteal with occluded PT and peroneal and short segment occlusion of AT- patient denies any previous intervention  - BLE arterial ultrasound 3/2023 with similar findings suggestive of bilateral popliteal and BTK disease; seen by Vascular surgery at [facility] with recommendation for amputation- patient declined  - transferred to [facility] for evaluation for revascularization; extensive disease and multiple co morbidities along with debility/bed bound status and concern for non compliance- case discussed with staff and deemed not a  Revascularization candidate; will continue medical management with statin and Plavix; no ASA given risk of bleeding with triple therapy  - seen by General surgery; discussed with patient high risk for limb loss- patient declined amputation per General surgery  - recommend follow up with Dr. Almanzar and Dr. Wu as an outpatient

## 2023-05-24 NOTE — PROGRESS NOTES
[facility] - Med Surg  Cardiology  Progress Note    Patient Name: Saji Castañeda  MRN: [id number]  Admission Date: 5/19/2023  Hospital Length of Stay: 5 days  Code Status: Full Code   Attending Physician: Anna Mcdonnell MD   Primary Care Physician: [facility] [place]  Expected Discharge Date:   Principal Problem:Chronic osteomyelitis    Subjective:     Hospital Course:   5/19/2023 per HPI   5/23/2023 CBC and BMP unchanged. HR down to 30s-40s with 2nd degree AVB at rest and up to 70s with activity- patient asymptomatic.           Review of Systems   Constitutional: Negative for chills, decreased appetite, diaphoresis and fever.   Cardiovascular:  Negative for chest pain, claudication, cyanosis, dyspnea on exertion, irregular heartbeat, leg swelling, near-syncope, orthopnea, palpitations, paroxysmal nocturnal dyspnea and syncope.   Respiratory:  Negative for cough, hemoptysis, shortness of breath and wheezing.    Skin:  Positive for poor wound healing.   Gastrointestinal:  Negative for bloating, abdominal pain, constipation, diarrhea, melena, nausea and vomiting.   Neurological:  Negative for dizziness and weakness.   Objective:     Vital Signs (Most Recent):  Temp: 97.9 °F (36.6 °C) (05/24/23 1151)  Pulse: (!) 39 (05/24/23 1223)  Resp: 18 (05/24/23 1151)  BP: (!) 140/65 (05/24/23 1151)  SpO2: 97 % (05/24/23 1151) Vital Signs (24h Range):  Temp:  [97.6 °F (36.4 °C)-98.7 °F (37.1 °C)] 97.9 °F (36.6 °C)  Pulse:  [38-51] 39  Resp:  [18-20] 18  SpO2:  [97 %-99 %] 97 %  BP: (130-159)/(58-70) 140/65     Weight: 113.3 kg (249 lb 12.5 oz)  Body mass index is 36.89 kg/m².     SpO2: 97 %       No intake or output data in the 24 hours ending 05/24/23 1231    Lines/Drains/Airways       Drain  Duration             Female External Urinary Catheter 05/16/23 1730 7 days              Peripheral Intravenous Line  Duration                  Peripheral IV - Single Lumen 05/15/23 1135 20 G Right Antecubital 9 days         Peripheral IV -  Single Lumen 05/16/23 0000 22 G Anterior;Left Forearm 8 days                       Physical Exam  Constitutional:       General: He is not in acute distress.     Appearance: He is well-developed.   Cardiovascular:      Rate and Rhythm: Normal rate and regular rhythm.      Heart sounds: No murmur heard.    No gallop.   Pulmonary:      Effort: Pulmonary effort is normal. No respiratory distress.      Breath sounds: Normal breath sounds. No wheezing.   Abdominal:      General: Bowel sounds are normal. There is no distension.      Palpations: Abdomen is soft.      Tenderness: There is no abdominal tenderness.   Skin:     General: Skin is warm and dry.   Neurological:      Mental Status: He is alert and oriented to person, place, and time.          Significant Labs: BMP:   Recent Labs   Lab 05/24/23  0353   *   *   K 4.4      CO2 28   BUN 33*   CREATININE 1.4   CALCIUM 8.8    and CBC   Recent Labs   Lab 05/24/23  0353   WBC 7.92   HGB 8.1*   HCT 26.3*          Significant Imaging: Echocardiogram: Transthoracic echo (TTE) complete (Cupid Only):   Results for orders placed or performed during the hospital encounter of 03/09/23   Echo   Result Value Ref Range    BSA 2.45 m2    TDI SEPTAL 0.13 m/s    LV LATERAL E/E' RATIO 9.33 m/s    LV SEPTAL E/E' RATIO 8.62 m/s    IVC diameter 2.4 cm    Left Ventricular Outflow Tract Mean Velocity 0.67 cm/s    Left Ventricular Outflow Tract Mean Gradient 2.08 mmHg    TDI LATERAL 0.12 m/s    LVIDd 5.29 3.5 - 6.0 cm    IVS 1.44 (A) 0.6 - 1.1 cm    Posterior Wall 1.41 (A) 0.6 - 1.1 cm    LVIDs 3.30 2.1 - 4.0 cm    FS 38 28 - 44 %    LV mass 326.18 g    LA size 3.81 cm    Left Ventricle Relative Wall Thickness 0.53 cm    AV mean gradient 4 mmHg    AV valve area 2.44 cm2    AV Velocity Ratio 0.71     AV index (prosthetic) 0.65     MV mean gradient 2 mmHg    MV valve area p 1/2 method 3.53 cm2    MV valve area by continuity eq 2.45 cm2    E/A ratio 1.23     Mean e' 0.13  "m/s    E wave deceleration time 212.48 msec    IVRT 138.92 msec    MV "A" wave duration [id number] msec    Pulm vein S/D ratio 1.76     LVOT diameter 2.18 cm    LVOT area 3.7 cm2    LVOT peak dl 0.97 m/s    LVOT peak VTI 22.00 cm    Ao peak dl 1.36 m/s    Ao VTI 33.6 cm    Mr max dl 2.36 m/s    LVOT stroke volume 82.07 cm3    AV peak gradient 7 mmHg    MV peak gradient 5 mmHg    E/E' ratio 8.96 m/s    MV Peak E Dl 1.12 m/s    TR Max Dl 2.64 m/s    MV VTI 33.5 cm    MV stenosis pressure 1/2 time 62.28 ms    MV Peak A Dl 0.91 m/s    PV Peak S Dl 0.37 m/s    PV Peak D Dl 0.21 m/s    LV Systolic Volume 44.08 mL    LV Systolic Volume Index 18.8 mL/m2    LV Diastolic Volume 134.78 mL    LV Diastolic Volume Index 57.35 mL/m2    LV Mass Index 139 g/m2    RA Major Axis 5.63 cm    Left Atrium Minor Axis 6.53 cm    Left Atrium Major Axis 7.50 cm    Triscuspid Valve Regurgitation Peak Gradient 28 mmHg    LA Volume Index (Mod) 48.1 mL/m2    LA volume (mod) 112.99 cm3    Right Atrial Pressure (from IVC) 8 mmHg    EF 70 %    Sinus 3.50 cm    TV rest pulmonary artery pressure 36 mmHg    Narrative    · The left ventricle is mildly enlarged with concentric hypertrophy and   normal systolic function.  · The estimated ejection fraction is 70%.  · Normal left ventricular diastolic function.  · Normal right ventricular size with normal right ventricular systolic   function.  · Severe left atrial enlargement.  · Intermediate central venous pressure (8 mmHg).  · The estimated PA systolic pressure is 36 mmHg.  · Small pericardial effusion.  · Valves not well visualized.        Assessment and Plan:     Brief HPI: Seen on afternoon rounds with Dr. Wu while resting in bed. Discussed POC as detailed below-verbalized understanding and agrees with POC     * Chronic osteomyelitis  - bilateral foot wounds for several months/years  - Blood culture pending; wound culture with multiple organisms  - on IV Rocephin  - further recs " per ID, Podiatry and primary team     AV block, 2nd degree  - HR upper 30s to 40s with second degree AVB; HR up to 60s-70s this AM with activity  - asymptomatic and BP stable  - no indication for pacemaker as of now; high risk for progression to CHB but pacemaker placement likely to be problematic given chronic osteomyelitis and current BLE wounds     Chronic deep vein thrombosis (DVT) of right upper extremity  - UE venous ultrasound 2022 with thrombosis of the right subclavian, axillary, basilic, and cephalic veins with notation of PICC at that time  - treated with Eliquis and remains on Eliquis  - no significant swelling noted     PVD (peripheral vascular disease)  - long standing wounds with skin graft to left TMA in [id number]; LE angio in 2014 with patent left CFA, SFA, PFA with 3V run off on LE angiogram by Vascular surgery  - CTA LE with run off 6/2022 with moderate to high grade disease of right popliteal with severely diseased AT, PT, peroneal and multiple occlusion of left popliteal with occluded PT and peroneal and short segment occlusion of AT- patient denies any previous intervention  - BLE arterial ultrasound 3/2023 with similar findings suggestive of bilateral popliteal and BTK disease; seen by V[facility] surgery at [facility] with recommendation for amputation- patient declined  - transferred to [facility] for evaluation for revascularization; extensive disease and multiple co morbidities along with debility/bed bound status and concern for non compliance- case discussed with staff and deemed not a  Revascularization candidate; will continue medical management with statin and Plavix; no ASA given risk of bleeding with triple therapy  - seen by General surgery; discussed with patient high risk for limb loss- patient declined amputation per General surgery  - recommend follow up with Dr. Almanzar and Dr. Wu as an outpatient     Asymptomatic Mobitz (type) I (Wenckebach's) atrioventricular block  - noted on  admit EKG with intermittent type II AVB  - avoid AV tj blockers     Essential hypertension  - on Isordil 10 TID, Nifedipine 60mg Losartan 100mg   - goal BP less than 130/80  - well controlled  - continue to monitor BP         VTE Risk Mitigation (From admission, onward)         Ordered     apixaban tablet 5 mg  2 times daily         05/19/23 1149                ANIYAH Dc, ANP  Cardiology  [facility]

## 2023-05-24 NOTE — HPI
"Per chart, " 73-year-old male with PMH of bilateral lower extremity chronic osteomyelitis s/p left foot metatarsal amputation, hypertension, CKD, RUE DVT on anticoagulation presented with worsening bilateral lower extremity wounds at [facility].  Imaging concerning for osteo.  Patient was seen by Podiatry, vascular surgery and Infectious Disease.  Vascular surgery recommended AKA given nonhealing wounds but patient declined.  Superficial wound cultures positive.  Blood cultures negative.  Infectious Disease recommended against antibiotics as compromised blood flow to lower extremities in setting of PAD would affect antibiotic delivery.  Urine cultures 5/15 grew Klebsiella pneumoniae for which she was started on ceftriaxone.  Hyperglycemic for which he was placed on IV insulin, since transitioned to basal/bolus insulin.  Per report, case was discussed with interventional Cardiology Dr. Barlow who agreed to evaluate for possible revascularization and recommended transfer to Hospital Medicine.    Patient had breakfast this morning and is currently on Eliquis for DVT.  He denies any new complaints during my encounter.  Vital signs stable.  He was transferred to ICU per transfer protocol.  Hemodynamically stable with no ICU needs.  Transfer orders to med/tele placed."  "

## 2023-05-24 NOTE — ASSESSMENT & PLAN NOTE
- on Isordil 10 TID, Nifedipine 60mg Losartan 100mg   - goal BP less than 130/80  - well controlled  - continue to monitor BP

## 2023-05-24 NOTE — SUBJECTIVE & OBJECTIVE
Interval History:  no acute ON events. No new complains. He declined amputation yesterday but would like to speak to the surgeon today. He is re-thinking his decision on amputation.   Palliative care consult pending    Review of Systems  Objective:     Vital Signs (Most Recent):  Temp: 97.9 °F (36.6 °C) (05/24/23 1151)  Pulse: (!) 39 (05/24/23 1223)  Resp: 18 (05/24/23 1151)  BP: (!) 140/65 (05/24/23 1151)  SpO2: 97 % (05/24/23 1151) Vital Signs (24h Range):  Temp:  [97.6 °F (36.4 °C)-98.7 °F (37.1 °C)] 97.9 °F (36.6 °C)  Pulse:  [38-51] 39  Resp:  [18-20] 18  SpO2:  [97 %-99 %] 97 %  BP: (130-159)/(58-70) 140/65     Weight: 113.3 kg (249 lb 12.5 oz)  Body mass index is 36.89 kg/m².  No intake or output data in the 24 hours ending 05/24/23 1252        Physical Exam  Vitals and nursing note reviewed.   Constitutional:       General: He is not in acute distress.     Appearance: He is well-developed.   Cardiovascular:      Rate and Rhythm: Regular rhythm. Bradycardia present.      Heart sounds: Normal heart sounds. No murmur heard.  Pulmonary:      Effort: Pulmonary effort is normal. No respiratory distress.      Breath sounds: Normal breath sounds.   Abdominal:      Palpations: Abdomen is soft.      Tenderness: There is no abdominal tenderness.   Musculoskeletal:      Right lower leg: Edema present.      Left lower leg: Edema present.      Comments: Bilateral LE wrapped, s/p L TMA   Skin:     Comments: B/l LE chronic skin wounds   Neurological:      Mental Status: He is alert and oriented to person, place, and time.           Significant Labs: All pertinent labs within the past 24 hours have been reviewed.    Significant Imaging: I have reviewed all pertinent imaging results/findings within the past 24 hours.

## 2023-05-24 NOTE — PROGRESS NOTES
"Surgery follow up  BP (!) 159/70   Pulse (!) 51   Temp 98.7 °F (37.1 °C)   Resp 20   Ht 5' 9" (1.753 m)   Wt 113.3 kg (249 lb 12.5 oz)   SpO2 97%   BMI 36.89 kg/m²   I/O last 3 completed shifts:  In: -   Out: 600 [Urine:600]  No intake/output data recorded.    Recent Results (from the past 336 hour(s))   CBC with Automated Differential    Collection Time: 05/24/23  3:53 AM   Result Value Ref Range    WBC 7.92 3.90 - 12.70 K/uL    Hemoglobin 8.1 (L) 14.0 - 18.0 g/dL    Hematocrit 26.3 (L) 40.0 - 54.0 %    Platelets 339 150 - 450 K/uL   CBC with Automated Differential    Collection Time: 05/22/23  3:47 AM   Result Value Ref Range    WBC 6.84 3.90 - 12.70 K/uL    Hemoglobin 8.1 (L) 14.0 - 18.0 g/dL    Hematocrit 25.7 (L) 40.0 - 54.0 %    Platelets 330 150 - 450 K/uL   CBC with Automated Differential    Collection Time: 05/20/23  3:53 AM   Result Value Ref Range    WBC 6.64 3.90 - 12.70 K/uL    Hemoglobin 7.9 (L) 14.0 - 18.0 g/dL    Hematocrit 25.5 (L) 40.0 - 54.0 %    Platelets 339 150 - 450 K/uL   Still not decided about amputation .  Will check xray right foot , on eloquest , will hold for possible surgery .    "

## 2023-05-25 VITALS
OXYGEN SATURATION: 97 % | HEART RATE: 37 BPM | DIASTOLIC BLOOD PRESSURE: 69 MMHG | SYSTOLIC BLOOD PRESSURE: 155 MMHG | TEMPERATURE: 98 F | BODY MASS INDEX: 37 KG/M2 | HEIGHT: 69 IN | WEIGHT: 249.81 LBS | RESPIRATION RATE: 20 BRPM

## 2023-05-25 LAB
POCT GLUCOSE: 171 MG/DL (ref 70–110)
POCT GLUCOSE: 182 MG/DL (ref 70–110)

## 2023-05-25 PROCEDURE — 97110 THERAPEUTIC EXERCISES: CPT | Mod: CQ

## 2023-05-25 PROCEDURE — 25000003 PHARM REV CODE 250: Performed by: STUDENT IN AN ORGANIZED HEALTH CARE EDUCATION/TRAINING PROGRAM

## 2023-05-25 PROCEDURE — 97530 THERAPEUTIC ACTIVITIES: CPT | Mod: CQ

## 2023-05-25 RX ORDER — INSULIN ASPART 100 [IU]/ML
12 INJECTION, SOLUTION INTRAVENOUS; SUBCUTANEOUS
Qty: 30 ML | Refills: 3 | Status: ON HOLD | OUTPATIENT
Start: 2023-05-25 | End: 2023-10-16 | Stop reason: SDUPTHER

## 2023-05-25 RX ADMIN — CLOPIDOGREL BISULFATE 75 MG: 75 TABLET ORAL at 08:05

## 2023-05-25 RX ADMIN — INSULIN ASPART 12 UNITS: 100 INJECTION, SOLUTION INTRAVENOUS; SUBCUTANEOUS at 08:05

## 2023-05-25 RX ADMIN — ATORVASTATIN CALCIUM 80 MG: 40 TABLET, FILM COATED ORAL at 08:05

## 2023-05-25 RX ADMIN — OXYCODONE HYDROCHLORIDE 5 MG: 5 TABLET ORAL at 09:05

## 2023-05-25 RX ADMIN — ISOSORBIDE DINITRATE 10 MG: 10 TABLET ORAL at 08:05

## 2023-05-25 RX ADMIN — INSULIN ASPART 2 UNITS: 100 INJECTION, SOLUTION INTRAVENOUS; SUBCUTANEOUS at 12:05

## 2023-05-25 RX ADMIN — LOSARTAN POTASSIUM 100 MG: 50 TABLET, FILM COATED ORAL at 08:05

## 2023-05-25 RX ADMIN — COLLAGENASE SANTYL: 250 OINTMENT TOPICAL at 08:05

## 2023-05-25 RX ADMIN — PANTOPRAZOLE SODIUM 40 MG: 40 TABLET, DELAYED RELEASE ORAL at 08:05

## 2023-05-25 RX ADMIN — INSULIN ASPART 12 UNITS: 100 INJECTION, SOLUTION INTRAVENOUS; SUBCUTANEOUS at 12:05

## 2023-05-25 RX ADMIN — TAMSULOSIN HYDROCHLORIDE 0.4 MG: 0.4 CAPSULE ORAL at 08:05

## 2023-05-25 RX ADMIN — NIFEDIPINE 60 MG: 60 TABLET, FILM COATED, EXTENDED RELEASE ORAL at 08:05

## 2023-05-25 NOTE — PLAN OF CARE
Problem: Diabetes Comorbidity  Goal: Blood Glucose Level Within Targeted Range  Outcome: Ongoing, Progressing     Problem: Renal Function Impairment (Acute Kidney Injury/Impairment)  Goal: Effective Renal Function  Outcome: Ongoing, Progressing     Problem: Impaired Wound Healing  Goal: Optimal Wound Healing  Outcome: Ongoing, Progressing     Problem: Fall Injury Risk  Goal: Absence of Fall and Fall-Related Injury  Outcome: Ongoing, Progressing

## 2023-05-25 NOTE — PLAN OF CARE
Pt continues to decline B AKA.  He says he wants to go home and have more time to think about having B AKA.  I discussed hospice at home with pt.  Pt declines and says he would like to discharge home with family and home health.  I spoke with pt's sister Kandy [phone].  We discussed pt is discharging home today with home health.  She verbalized understanding.  She will be home to accept pt this afternoon.      Ambulance ETA 15:30.    Surgery f/u appt requested.  Pt has been accepted by [facility].  They will let me know if he is active with another agency or not.      Future Appointments   Date Time Provider Department Center   5/30/2023  2:30 PM Narciso Hernandez DPM [facility]          05/25/23 1314   Final Note   Assessment Type Final Discharge Note   Anticipated Discharge Disposition Home-Health   Post-Acute Status   Post-Acute Placement Status Referrals Sent   Discharge Delays None known at this time       Ike Vargas RN   Supervisor [facility] Management[facility]  [phone]

## 2023-05-25 NOTE — DISCHARGE SUMMARY
[facility] Medicine  Discharge Summary      Patient Name: Saji Castañeda  MRN: [id number]  BREEZY: [id number]  Patient Class: IP- Inpatient  Admission Date: 5/19/2023  Hospital Length of Stay: 6 days  Discharge Date and Time: 5/25/2023  4:06 PM  Attending Physician: No att. providers found   Discharging Provider: Anna Mcdonnell MD  Primary Care Provider: [facility]    Primary Care Team: Networked reference to record PCT     HPI:   73-year-old male with PMH of bilateral lower extremity chronic osteomyelitis s/p left foot metatarsal amputation, hypertension, CKD, RUE DVT on anticoagulation presented with worsening bilateral lower extremity wounds at [facility].  Imaging concerning for osteo.  Patient was seen by Podiatry, vascular surgery and Infectious Disease.  Vascular surgery recommended AKA given nonhealing wounds but patient declined.  Superficial wound cultures positive.  Blood cultures negative.  Infectious Disease recommended against antibiotics as compromised blood flow to lower extremities in setting of PAD would affect antibiotic delivery.  Urine cultures 5/15 grew Klebsiella pneumoniae for which she was started on ceftriaxone.  Hyperglycemic for which he was placed on IV insulin, since transitioned to basal/bolus insulin.  Per report, case was discussed with interventional Cardiology Dr. Barlow who agreed to evaluate for possible revascularization and recommended transfer to Hospital Medicine.    Patient had breakfast this morning and is currently on Eliquis for DVT.  He denies any new complaints during my encounter.  Vital signs stable.  He was transferred to ICU per transfer protocol.  Hemodynamically stable with no ICU needs.  Transfer orders to med/tele placed.      * No surgery found *      Hospital Course:   73-year-old male with PMH of chronic bilateral lower extremity osteomyelitis s/p left foot metatarsal amputation, hypertension, right upper extremity DVT on  anticoagulation, type 2 diabetes mellitus, peripheral artery disease, type 1 Mobitz AV block who was transferred from [facility] to evaluate for revascularization candidacy by Interventional Cardiology.  He was not deemed to be a revascularization candidate by Cardiology.  Surgery was consulted -patient initially refused amputation but later wanted to think about it.  Infectious Disease at [facility] prior to transfer recommended no antibiotics given poor circulation and clinical stability.  Completed 7 days of ceftriaxone for Klebsiella UTI.  Noted to have asymptomatic Mobitz type 1 AV block with intermittent type 2 block.  Given patient was asymptomatic with a stable blood pressure, cardiology recommended avoiding AV tj blocking agents and monitoring.  Patient was offered nursing home placement but declined.  He was seen by the palliative care team and elected to be a full code.  Expressed he understood the risk of not pursuing amputation including but not limited to recurrent infections and death.  He chose to go home with home health which was ordered.  Outpatient cardiology, surgery follow-up ordered.  Per Cardiology recommendations, he was continued on Eliquis and Plavix.  Aspirin was discontinued to avoid triple therapy and risk of bleeding.    Physical Exam  Vitals and nursing note reviewed.   Constitutional:       General: He is not in acute distress.     Appearance: He is well-developed.   Cardiovascular:      Rate and Rhythm: Regular rhythm. Bradycardia present.      Heart sounds: Normal heart sounds. No murmur heard.  Pulmonary:      Effort: Pulmonary effort is normal. No respiratory distress.      Breath sounds: Normal breath sounds.   Abdominal:      Palpations: Abdomen is soft.      Tenderness: There is no abdominal tenderness.   Musculoskeletal:      Right lower leg: Edema present.      Left lower leg: Edema present.      Comments: Bilateral LE wrapped, s/p L TMA   Skin:     Comments: B/l LE  chronic skin wounds   Neurological:      Mental Status: He is alert and oriented to person, place, and time.           Goals of Care Treatment Preferences:  Code Status: Full Code    Health care agent: January Nicholson [phone]  Health care agent number: [phone]          What is most important right now is to focus on avoiding the hospital, remaining as independent as possible.  Accordingly, we have decided that the best plan to meet the patient's goals includes continuing with treatment      Consults:   Consults (From admission, onward)        Status Ordering Provider     Inpatient consult to Palliative Care  Once        Provider:  (Not yet assigned)    Completed JANUSZ JACKSON     Inpatient consult to Cardiology-[facility]  Once        Provider:  (Not yet assigned)    Completed JANUSZ JACKSON     IP consult to case management  Once        Provider:  (Not yet assigned)    Completed JANUSZ JACKSON          Final Active Diagnoses:    Diagnosis Date Noted POA    PRINCIPAL PROBLEM:  Chronic osteomyelitis [M86.60] 05/15/2023 Yes    AV block, 2nd degree [I44.1] 05/24/2023 Yes    Palliative care encounter [Z51.5] 05/24/2023 Not Applicable    UTI (urinary tract infection) [N39.0] 05/16/2023 Yes    Goals of care, counseling/discussion [Z71.89]  Not Applicable    Chronic deep vein thrombosis (DVT) of right upper extremity [I82.721] 07/06/2022 Yes    Morbid obesity [E66.01] 02/18/2021 Yes    PVD (peripheral vascular disease) [I73.9] 02/03/2021 Yes    Asymptomatic Mobitz (type) I (Wenckebach's) atrioventricular block [I44.1] 12/06/2019 Yes    Insulin dependent type 2 diabetes mellitus [E11.9, Z79.4] 09/23/2014 Not Applicable    Essential hypertension [I10] 08/05/2014 Yes     Chronic      Problems Resolved During this Admission:       Discharged Condition: stable    Disposition: Home or Self Care    Follow Up:    Patient Instructions:      Ambulatory referral/consult to General Surgery   Standing Status:  Future   Referral Priority: Routine Referral Type: Consultation   Referral Reason: Specialty Services Required   Referred to Provider: LEON NASH Requested Specialty: General Surgery   Number of Visits Requested: 1     Ambulatory referral/consult to Cardiology   Standing Status: Future   Referral Priority: Routine Referral Type: Consultation   Referral Reason: Specialty Services Required   Requested Specialty: Cardiology   Number of Visits Requested: 1       Significant Diagnostic Studies: Labs:   BMP:   Recent Labs   Lab 05/24/23  0353   *   *   K 4.4      CO2 28   BUN 33*   CREATININE 1.4   CALCIUM 8.8    and CBC   Recent Labs   Lab 05/24/23  0353   WBC 7.92   HGB 8.1*   HCT 26.3*          Pending Diagnostic Studies:     None         Medications:  Reconciled Home Medications:      Medication List      CHANGE how you take these medications    gabapentin 100 MG capsule  Commonly known as: NEURONTIN  Take 2 capsules (200 mg total) by mouth 2 (two) times daily.  What changed: how much to take     hydrALAZINE 25 MG tablet  Commonly known as: APRESOLINE  Take 1 tablet (25 mg total) by mouth 3 (three) times daily.  What changed: when to take this     LANTUS SOLOSTAR U-100 INSULIN glargine 100 units/mL SubQ pen  Generic drug: insulin  Inject 45 Units into the skin every evening.  What changed: how much to take     NovoLOG FlexPen U-100 Insulin 100 unit/mL (3 mL) Inpn pen  Generic drug: insulin aspart U-100  Inject 12 Units into the skin 3 (three) times daily with meals.  What changed: how much to take        CONTINUE taking these medications    ascorbic acid (vitamin C) 500 MG tablet  Commonly known as: VITAMIN C  Take 500 mg by mouth once daily.     B COMPLEX-VITAMIN B12 tablet  Generic drug: b complex vitamins  Take 1 tablet by mouth once daily.     blood sugar diagnostic Strp  Commonly known as: CONTOUR TEST STRIPS  Inject 1 strip into the skin 2 (two) times daily before meals.    "  clopidogreL 75 mg tablet  Commonly known as: PLAVIX  Take 1 tablet (75 mg total) by mouth once daily.     ELIQUIS 5 mg Tab  Generic drug: apixaban  Take 1 tablet (5 mg total) by mouth 2 (two) times daily.     FLOMAX 0.4 mg Cap  Generic drug: tamsulosin  Take 0.4 mg by mouth once daily.     furosemide 40 MG tablet  Commonly known as: LASIX  Take 40 mg by mouth once daily.     glipiZIDE 10 MG tablet  Commonly known as: GLUCOTROL  Take 20 mg by mouth 2 (two) times a day.     isosorbide dinitrate 10 MG tablet  Commonly known as: ISORDIL  Take 1 tablet (10 mg total) by mouth 3 (three) times daily.     losartan-hydrochlorothiazide 100-25 mg 100-25 mg per tablet  Commonly known as: HYZAAR  Take 1 tablet by mouth once daily.     metFORMIN 500 MG ER 24hr tablet  Commonly known as: GLUCOPHAGE-XR  Take 1,000 mg by mouth 2 (two) times a day.     MICROLET LANCET Misc  Generic drug: lancets     NIFEdipine 60 MG Tbsr  Commonly known as: ADALAT CC  Take 60 mg by mouth once daily.     pantoprazole 40 MG tablet  Commonly known as: PROTONIX  Take 40 mg by mouth once daily.     rosuvastatin 40 MG Tab  Commonly known as: CRESTOR  Take 40 mg by mouth once daily.        STOP taking these medications    aspirin 81 MG EC tablet  Commonly known as: ECOTRIN        ASK your doctor about these medications    * BD ULTRA-FINE ADITYA PEN NEEDLE 32 gauge x 5/32" Ndle  Generic drug: pen needle, diabetic  USE FOUR TIMES DAILY WITH MEALS AND NIGHTLY  Ask about: Which instructions should I use?     * BD ULTRA-FINE ADITYA PEN NEEDLE 32 gauge x 5/32" Ndle  Generic drug: pen needle, diabetic  use as directed with insulin  Ask about: Which instructions should I use?         * This list has 2 medication(s) that are the same as other medications prescribed for you. Read the directions carefully, and ask your doctor or other care provider to review them with you.                Indwelling Lines/Drains at time of discharge:   Lines/Drains/Airways     None        "          Time spent on the discharge of patient: 38 minutes         Anna Mcdonnell MD  Department of Hospital Medicine  [facility]

## 2023-05-25 NOTE — PLAN OF CARE
[facility]      HOME HEALTH ORDERS  FACE TO FACE ENCOUNTER    Patient Name: Saji Castañeda  YOB: 1950    PCP: [facility]   PCP Address: [address] [place] [address] [id number]  PCP Phone Number: [phone]  PCP Fax: [phone]    Encounter Date: 5/18/23    Admit to Home Health    Diagnoses:  Active Hospital Problems    Diagnosis  POA    *Chronic osteomyelitis [M86.60]  Yes    AV block, 2nd degree [I44.1]  Yes    Palliative care encounter [Z51.5]  Not Applicable    UTI (urinary tract infection) [N39.0]  Yes    Goals of care, counseling/discussion [Z71.89]  Not Applicable    Chronic deep vein thrombosis (DVT) of right upper extremity [I82.721]  Yes    Morbid obesity [E66.01]  Yes    PVD (peripheral vascular disease) [I73.9]  Yes    Asymptomatic Mobitz (type) I (Wenckebach's) atrioventricular block [I44.1]  Yes    Insulin dependent type 2 diabetes mellitus [E11.9, Z79.4]  Not Applicable    Essential hypertension [I10]  Yes     Chronic      Resolved Hospital Problems   No resolved problems to display.       Follow Up Appointments:  Future Appointments   Date Time Provider Department Center   5/30/2023  2:30 PM Narciso Hernandez DPM [facility]       Allergies:Review of patient's allergies indicates:  No Known Allergies    Medications: Review discharge medications with patient and family and provide education.    Current Facility-Administered Medications   Medication Dose Route Frequency Provider Last Rate Last Admin    acetaminophen tablet 650 mg  650 mg Oral Q6H PRN Anna Mcdonnell MD        atorvastatin tablet 80 mg  80 mg Oral Daily Anna Mcdonnell MD   80 mg at 05/25/23 0851    clopidogreL tablet 75 mg  75 mg Oral Daily Anna Mcdonnell MD   75 mg at 05/25/23 0851    collagenase ointment   Topical (Top) Daily Anna Mcdonnell MD   Given at 05/25/23 0851    dextrose 10% bolus 125 mL 125 mL  12.5 g Intravenous PRN Anna Mcdonnell MD        dextrose 10% bolus 250 mL 250 mL   25 g Intravenous PRN Anna Mcdonnell MD        dextrose 40 % gel 15,000 mg  15 g Oral PRN Anna Mcdonnell MD        dextrose 40 % gel 30,000 mg  30 g Oral PRN Anna Mcdonnell MD        glucagon (human recombinant) injection 1 mg  1 mg Intramuscular PRN Anna Mcdonnell MD        insulin aspart U-100 pen 1-10 Units  1-10 Units Subcutaneous QID (AC + HS) PRN Anna Mcdonnell MD   2 Units at 05/25/23 1224    insulin aspart U-100 pen 12 Units  12 Units Subcutaneous TIDWM Anna Mcdonnell MD   12 Units at 05/25/23 1223    insulin detemir U-100 (Levemir) pen 18 Units  18 Units Subcutaneous BID Anna Mcdonnell MD   18 Units at 05/25/23 0851    isosorbide dinitrate tablet 10 mg  10 mg Oral TID Anna Mcdonnell MD   10 mg at 05/25/23 0851    lactulose 20 gram/30 mL solution Soln 30 g  30 g Oral Once Anna Mcdonnell MD        losartan tablet 100 mg  100 mg Oral Daily Anna Mcdonnell MD   100 mg at 05/25/23 0851    melatonin tablet 6 mg  6 mg Oral Nightly PRN Anna Mcdonnell MD   6 mg at 05/24/23 2136    naloxone 0.4 mg/mL injection 0.02 mg  0.02 mg Intravenous PRN Anna Mcdonnell MD        NIFEdipine 24 hr tablet 60 mg  60 mg Oral Daily Anna Mcdonnell MD   60 mg at 05/25/23 0851    oxyCODONE immediate release tablet 5 mg  5 mg Oral Q6H PRN Anna Mcdonnell MD   5 mg at 05/25/23 0900    pantoprazole EC tablet 40 mg  40 mg Oral Daily Anna Mcdonnell MD   40 mg at 05/25/23 0851    polyethylene glycol packet 17 g  17 g Oral Daily Anna Mcdonnell MD   17 g at 05/23/23 0850    senna-docusate 8.6-50 mg per tablet 1 tablet  1 tablet Oral BID Anna Mcdonnell MD   1 tablet at 05/24/23 2125    sodium chloride 0.9% flush 10 mL  10 mL Intravenous Q12H PRN Anna Mcdonnell MD        tamsulosin 24 hr capsule 0.4 mg  0.4 mg Oral Daily Anna Mcdonnell MD   0.4 mg at 05/25/23 0819     Current Discharge Medication List        CONTINUE these medications which have CHANGED    Details   insulin aspart U-100 (NOVOLOG) 100 unit/mL (3 mL) InPn pen Inject  "12 Units into the skin 3 (three) times daily with meals.  Qty: 30 mL, Refills: 3           CONTINUE these medications which have NOT CHANGED    Details   apixaban (ELIQUIS) 5 mg Tab Take 1 tablet (5 mg total) by mouth 2 (two) times daily.  Qty: 60 tablet, Refills: 0      ascorbic acid, vitamin C, (VITAMIN C) 500 MG tablet Take 500 mg by mouth once daily.      B COMPLEX-VITAMIN B12 tablet Take 1 tablet by mouth once daily.      BD ULTRA-FINE ADITYA PEN NEEDLE 32 gauge x 5/32" Ndle USE FOUR TIMES DAILY WITH MEALS AND NIGHTLY  Qty: 100 each, Refills: 0    Associated Diagnoses: Type II diabetes mellitus, uncontrolled      blood sugar diagnostic (CONTOUR TEST STRIPS) Strp Inject 1 strip into the skin 2 (two) times daily before meals.  Qty: 100 strip, Refills: 6    Comments: To use with Contour Ascencia meter  Associated Diagnoses: Type II diabetes mellitus, uncontrolled      clopidogreL (PLAVIX) 75 mg tablet Take 1 tablet (75 mg total) by mouth once daily.  Qty: 60 tablet, Refills: 0      furosemide (LASIX) 40 MG tablet Take 40 mg by mouth once daily.      gabapentin (NEURONTIN) 100 MG capsule Take 2 capsules (200 mg total) by mouth 2 (two) times daily.  Qty: 120 capsule, Refills: 1      glipiZIDE (GLUCOTROL) 10 MG tablet Take 20 mg by mouth 2 (two) times a day.      hydrALAZINE (APRESOLINE) 25 MG tablet Take 1 tablet (25 mg total) by mouth 3 (three) times daily.  Qty: 90 tablet, Refills: 11    Comments: .      isosorbide dinitrate (ISORDIL) 10 MG tablet Take 1 tablet (10 mg total) by mouth 3 (three) times daily.  Qty: 90 tablet, Refills: 11      LANTUS SOLOSTAR U-100 INSULIN glargine 100 units/mL SubQ pen Inject 45 Units into the skin every evening.  Refills: 0      losartan-hydrochlorothiazide 100-25 mg (HYZAAR) 100-25 mg per tablet Take 1 tablet by mouth once daily.      metFORMIN (GLUCOPHAGE-XR) 500 MG ER 24hr tablet Take 1,000 mg by mouth 2 (two) times a day.      MICROLET LANCET Misc Refills: 0      NIFEdipine " (ADALAT CC) 60 MG TbSR Take 60 mg by mouth once daily.      pantoprazole (PROTONIX) 40 MG tablet Take 40 mg by mouth once daily.      rosuvastatin (CRESTOR) 40 MG Tab Take 40 mg by mouth once daily.      tamsulosin (FLOMAX) 0.4 mg Cap Take 0.4 mg by mouth once daily.           STOP taking these medications       aspirin (ECOTRIN) 81 MG EC tablet Comments:   Reason for Stopping:                 I have seen and examined this patient within the last 30 days. My clinical findings that support the need for the home health skilled services and home bound status are the following:no   Weakness/numbness causing balance and gait disturbance due to Infection and Weakness/Debility making it taxing to leave home.  Requiring assistive device to leave home due to unsteady gait caused by  Infection and Weakness/Debility.     Diet:   cardiac diet and diabetic diet 2000 calorie    Referrals/ Consults  Physical Therapy to evaluate and treat. Evaluate for home safety and equipment needs; Establish/upgrade home exercise program. Perform / instruct on therapeutic exercises, gait training, transfer training, and Range of Motion.  Occupational Therapy to evaluate and treat. Evaluate home environment for safety and equipment needs. Perform/Instruct on transfers, ADL training, ROM, and therapeutic exercises.    Activities:   Non weight bearing on BL LE    Nursing:   Agency to admit patient within 24 hours of hospital discharge unless specified on physician order or at patient request    SN to complete comprehensive assessment including routine vital signs. Instruct on disease process and s/s of complications to report to MD. Review/verify medication list sent home with the patient at time of discharge  and instruct patient/caregiver as needed. Frequency may be adjusted depending on start of care date.     Skilled nurse to perform up to 3 visits PRN for symptoms related to diagnosis    Notify MD if SBP > 160 or < 90; DBP > 90 or < 50; HR >  120 or < 50; Temp > 101; O2 < 88%    Ok to schedule additional visits based on staff availability and patient request on consecutive days within the home health episode.    When multiple disciplines ordered:    Start of Care occurs on Sunday - Wednesday schedule remaining discipline evaluations as ordered on separate consecutive days following the start of care.    Thursday SOC -schedule subsequent evaluations Friday and Monday the following week.     Friday - Saturday SOC - schedule subsequent discipline evaluations on consecutive days starting Monday of the following week.    For all post-discharge communication and subsequent orders please contact patient's primary care physician.     Miscellaneous   Diabetic Care:   SN to perform and educate Diabetic management with blood glucose monitoring:, Fingerstick blood sugar AC and HS, and Report CBG < 60 or > 350 to physician.    Home Health Aide:  Nursing Three times weekly, Physical Therapy Twice weekly, and Occupational Therapy Twice weekly    Wound Care Orders  Wound change twice a week : Cleanse bilateral lower extremity wounds with Vashe.  Dry thoroughly. Apply generous layer of Santyl enzymatic wound debrider to bilateral lower extremity wound bases. Apply Hydrofera blue directly to bilateral lower extremity wound bases, follow with Kerlix, cast padding, and Ace bandages without compression.    I certify that this patient is confined to his home and needs intermittent skilled nursing care, physical therapy, and occupational therapy.

## 2023-05-25 NOTE — HOSPITAL COURSE
73-year-old male with PMH of chronic bilateral lower extremity osteomyelitis s/p left foot metatarsal amputation, hypertension, right upper extremity DVT on anticoagulation, type 2 diabetes mellitus, peripheral artery disease, type 1 Mobitz AV block who was transferred from [facility] to evaluate for revascularization candidacy by Interventional Cardiology.  He was not deemed to be a revascularization candidate by Cardiology.  Surgery was consulted -patient initially refused amputation but later wanted to think about it.  Infectious Disease at [facility] prior to transfer recommended no antibiotics given poor circulation and clinical stability.  Completed 7 days of ceftriaxone for Klebsiella UTI.  Noted to have asymptomatic Mobitz type 1 AV block with intermittent type 2 block.  Given patient was asymptomatic with a stable blood pressure, cardiology recommended avoiding AV tj blocking agents and monitoring.  Patient was offered nursing home placement but declined.  He was seen by the palliative care team and elected to be a full code.  Expressed he understood the risk of not pursuing amputation including but not limited to recurrent infections and death.  He chose to go home with home health which was ordered.  Outpatient cardiology, surgery follow-up ordered.  Per Cardiology recommendations, he was continued on Eliquis and Plavix.  Aspirin was discontinued to avoid triple therapy and risk of bleeding.    Physical Exam  Vitals and nursing note reviewed.   Constitutional:       General: He is not in acute distress.     Appearance: He is well-developed.   Cardiovascular:      Rate and Rhythm: Regular rhythm. Bradycardia present.      Heart sounds: Normal heart sounds. No murmur heard.  Pulmonary:      Effort: Pulmonary effort is normal. No respiratory distress.      Breath sounds: Normal breath sounds.   Abdominal:      Palpations: Abdomen is soft.      Tenderness: There is no abdominal tenderness.    Musculoskeletal:      Right lower leg: Edema present.      Left lower leg: Edema present.      Comments: Bilateral LE wrapped, s/p L TMA   Skin:     Comments: B/l LE chronic skin wounds   Neurological:      Mental Status: He is alert and oriented to person, place, and time.

## 2023-05-25 NOTE — PLAN OF CARE
Problem: Physical Therapy  Goal: Physical Therapy Goal  Description: Goals to be met by: 23     Patient will increase functional independence with mobility by performin. Supine to sit with Modified [place]  2. Sit to supine with Modified [place]  3. Bed to chair transfer with Supervision  squat pivot or using sliding board   4. Wheelchair propulsion x 100 feet with Modified [place] using bilateral uppper extremities    Outcome: Ongoing, Progressing

## 2023-05-25 NOTE — NURSING
AVS reviewed by VN. Bedside delivery of medications. Pt verbalized understanding of insulin administration, and states has used dial pens previously. Reinforced that education section in AVS could provide additional information if needed. WC completed by this RN prior to d/c, pt tolerated well. Z flex boots on pt at discharge. D/c via [employer] stretcher with all personal belongings including cell phone, pt's own WC with transport personnel at time of discharge.

## 2023-05-25 NOTE — PROGRESS NOTES
Discharge orders noted. Additional clinical references attached. Patient's discharge instructions given by bedside RN and reviewed via this VN.  Education provided on new medication, diagnosis, and follow-up appointments.  Teach back method used. Patient verbalized understanding. All questions answered. Patient awaiting hospital assisted transportation to home. Bedside nurse updated on patient status.   05/25/23 2976   AVS Confirmation   Discharge instructions and AVS given to and reviewed with patient and/or significant other. Yes

## 2023-05-25 NOTE — NURSING
Medications given per MAR. Telemetry in progress remains bradycardic 37-39 overnight. Blood glucose checked as ordered. New condom cath placed this am also diaper and white pad changed patient incontinent of urine in bed. No complaints overnight. Safety maintained, isolation precautions maintained, call light in reach, bed alarm in use.

## 2023-05-26 ENCOUNTER — PATIENT OUTREACH (OUTPATIENT)
Dept: ADMINISTRATIVE | Facility: CLINIC | Age: 73
End: 2023-05-26
Payer: MEDICARE

## 2023-05-26 ENCOUNTER — PATIENT OUTREACH (OUTPATIENT)
Dept: ADMINISTRATIVE | Facility: OTHER | Age: 73
End: 2023-05-26
Payer: MEDICARE

## 2023-05-26 NOTE — PROGRESS NOTES
IP Liaison - Final Visit Note    Patient: Saji Castañeda  MRN:  [id number]  Date of Service:  5/26/2023  Completed by:  LARON Albert    Reason for Visit   Patient presents with    IP Liaison Chart Review        Patient Summary     Discharge Date: 5/25/2023  Discharge telephone number/address: [phone] / [address] [place] [address]  Follow up provider: Narciso Hernandez DPM  Follow up appointments: 5/30/2023 @ 2:30pm  Home Health agency & telephone number: [facility]   DME ordered &  name: n/a  Assigned OPCM RN/SW: n/a  Report sent to follow up team (PCP/OPCM) via in basket message: n/a  Community Resources arranged including agency name & contact info: n/a      LARON Albert

## 2023-05-26 NOTE — PROGRESS NOTES
C3 nurse attempted to contact Saji Castañeda for a TCC post hospital discharge follow up call. No answer. The patient does not have a scheduled HOSFU appointment, and the pt does not have an [facility] PCP.

## 2023-05-30 ENCOUNTER — TELEPHONE (OUTPATIENT)
Dept: PODIATRY | Facility: CLINIC | Age: 73
End: 2023-05-30

## 2023-05-30 NOTE — TELEPHONE ENCOUNTER
Attempted to call pt at [phone] with no answer and was unable to leave a message.    Called pt sister Kandy and left message on her machine for us to be contacted at [phone] to get pt reschedule for missed appt on 5/30/2023

## 2023-06-05 ENCOUNTER — TELEPHONE (OUTPATIENT)
Dept: PODIATRY | Facility: CLINIC | Age: 73
End: 2023-06-05
Payer: MEDICARE

## 2023-06-05 NOTE — TELEPHONE ENCOUNTER
Patient called inquiring about Home health nurse have not came to perform wound care in 2 weeks. He has bilateral wounds that need attention. His sister Kandy left message to contact her to get information on care. Called Loli and Ken to reach out if they care for Saji Castañeda.

## 2023-06-05 NOTE — TELEPHONE ENCOUNTER
Attempted 2 times to call pt. Sister busy signal on both attempts. Calling in regards to message on advice how to get home health restarted.

## 2023-06-16 ENCOUNTER — EXTERNAL HOME HEALTH (OUTPATIENT)
Dept: HOME HEALTH SERVICES | Facility: HOSPITAL | Age: 73
End: 2023-06-16
Payer: MEDICARE

## 2023-06-21 ENCOUNTER — DOCUMENT SCAN (OUTPATIENT)
Dept: HOME HEALTH SERVICES | Facility: HOSPITAL | Age: 73
End: 2023-06-21
Payer: MEDICARE

## 2023-08-14 PROBLEM — N17.9 AKI (ACUTE KIDNEY INJURY): Status: RESOLVED | Noted: 2018-10-15 | Resolved: 2023-08-14

## 2023-08-21 PROBLEM — N39.0 UTI (URINARY TRACT INFECTION): Status: RESOLVED | Noted: 2023-05-16 | Resolved: 2023-08-21

## 2023-10-02 ENCOUNTER — HOSPITAL ENCOUNTER (INPATIENT)
Facility: HOSPITAL | Age: 73
LOS: 17 days | Discharge: NURSING, REHAB OR LONG-TERM CARE | DRG: 853 | End: 2023-10-19
Attending: EMERGENCY MEDICINE | Admitting: INTERNAL MEDICINE
Payer: MEDICARE

## 2023-10-02 DIAGNOSIS — I44.1 AV BLOCK, 2ND DEGREE: ICD-10-CM

## 2023-10-02 DIAGNOSIS — E87.5 HYPERKALEMIA: ICD-10-CM

## 2023-10-02 DIAGNOSIS — N17.9 AKI (ACUTE KIDNEY INJURY): ICD-10-CM

## 2023-10-02 DIAGNOSIS — M86.60 CHRONIC OSTEOMYELITIS: ICD-10-CM

## 2023-10-02 DIAGNOSIS — I48.0 PAROXYSMAL ATRIAL FIBRILLATION: Chronic | ICD-10-CM

## 2023-10-02 DIAGNOSIS — Z45.2 ENCOUNTER FOR CENTRAL LINE PLACEMENT: ICD-10-CM

## 2023-10-02 DIAGNOSIS — E16.2 HYPOGLYCEMIA: ICD-10-CM

## 2023-10-02 DIAGNOSIS — I44.2 THIRD DEGREE AV BLOCK: ICD-10-CM

## 2023-10-02 DIAGNOSIS — R53.1 WEAKNESS: ICD-10-CM

## 2023-10-02 DIAGNOSIS — R78.81 BACTEREMIA DUE TO GRAM-NEGATIVE BACTERIA: ICD-10-CM

## 2023-10-02 DIAGNOSIS — M86.9 OSTEOMYELITIS OF LEFT LOWER EXTREMITY: Primary | ICD-10-CM

## 2023-10-02 DIAGNOSIS — I44.2 COMPLETE AV BLOCK: ICD-10-CM

## 2023-10-02 DIAGNOSIS — M86.9 OSTEOMYELITIS OF RIGHT LOWER EXTREMITY: ICD-10-CM

## 2023-10-02 DIAGNOSIS — R57.0 CARDIOGENIC SHOCK: ICD-10-CM

## 2023-10-02 DIAGNOSIS — I44.2 COMPLETE HEART BLOCK: ICD-10-CM

## 2023-10-02 PROBLEM — A41.9 SEPSIS: Status: ACTIVE | Noted: 2023-10-02

## 2023-10-02 PROBLEM — M86.672 CHRONIC OSTEOMYELITIS OF LEFT FOOT: Status: ACTIVE | Noted: 2021-03-23

## 2023-10-02 LAB
ALBUMIN SERPL BCP-MCNC: 2.3 G/DL (ref 3.5–5.2)
ALLENS TEST: ABNORMAL
ALP SERPL-CCNC: 38 U/L (ref 55–135)
ALT SERPL W/O P-5'-P-CCNC: 13 U/L (ref 10–44)
ANION GAP SERPL CALC-SCNC: 9 MMOL/L (ref 8–16)
AST SERPL-CCNC: 16 U/L (ref 10–40)
BACTERIA #/AREA URNS AUTO: ABNORMAL /HPF
BASOPHILS # BLD AUTO: 0.04 K/UL (ref 0–0.2)
BASOPHILS NFR BLD: 0.5 % (ref 0–1.9)
BILIRUB SERPL-MCNC: 0.2 MG/DL (ref 0.1–1)
BILIRUB UR QL STRIP: NEGATIVE
BUN SERPL-MCNC: 48 MG/DL (ref 8–23)
CALCIUM SERPL-MCNC: 9 MG/DL (ref 8.7–10.5)
CHLORIDE SERPL-SCNC: 105 MMOL/L (ref 95–110)
CLARITY UR REFRACT.AUTO: ABNORMAL
CO2 SERPL-SCNC: 25 MMOL/L (ref 23–29)
COLOR UR AUTO: YELLOW
CREAT SERPL-MCNC: 2.6 MG/DL (ref 0.5–1.4)
DELSYS: ABNORMAL
DIFFERENTIAL METHOD: ABNORMAL
EOSINOPHIL # BLD AUTO: 0.2 K/UL (ref 0–0.5)
EOSINOPHIL NFR BLD: 3.1 % (ref 0–8)
ERYTHROCYTE [DISTWIDTH] IN BLOOD BY AUTOMATED COUNT: 16.7 % (ref 11.5–14.5)
EST. GFR  (NO RACE VARIABLE): 25.1 ML/MIN/1.73 M^2
GLUCOSE SERPL-MCNC: 43 MG/DL (ref 70–110)
GLUCOSE UR QL STRIP: NEGATIVE
HCT VFR BLD AUTO: 26.9 % (ref 40–54)
HGB BLD-MCNC: 8.5 G/DL (ref 14–18)
HGB UR QL STRIP: ABNORMAL
HYALINE CASTS UR QL AUTO: 0 /LPF
IMM GRANULOCYTES # BLD AUTO: 0.01 K/UL (ref 0–0.04)
IMM GRANULOCYTES NFR BLD AUTO: 0.1 % (ref 0–0.5)
KETONES UR QL STRIP: NEGATIVE
LDH SERPL L TO P-CCNC: 2.29 MMOL/L (ref 0.5–2.2)
LEUKOCYTE ESTERASE UR QL STRIP: ABNORMAL
LYMPHOCYTES # BLD AUTO: 1 K/UL (ref 1–4.8)
LYMPHOCYTES NFR BLD: 13.6 % (ref 18–48)
MAGNESIUM SERPL-MCNC: 1.8 MG/DL (ref 1.6–2.6)
MCH RBC QN AUTO: 27.8 PG (ref 27–31)
MCHC RBC AUTO-ENTMCNC: 31.6 G/DL (ref 32–36)
MCV RBC AUTO: 88 FL (ref 82–98)
MICROSCOPIC COMMENT: ABNORMAL
MONOCYTES # BLD AUTO: 0.7 K/UL (ref 0.3–1)
MONOCYTES NFR BLD: 8.7 % (ref 4–15)
NEUTROPHILS # BLD AUTO: 5.6 K/UL (ref 1.8–7.7)
NEUTROPHILS NFR BLD: 74 % (ref 38–73)
NITRITE UR QL STRIP: NEGATIVE
NRBC BLD-RTO: 0 /100 WBC
PH UR STRIP: 5 [PH] (ref 5–8)
PHOSPHATE SERPL-MCNC: 3.3 MG/DL (ref 2.7–4.5)
PLATELET # BLD AUTO: 184 K/UL (ref 150–450)
PMV BLD AUTO: 10.4 FL (ref 9.2–12.9)
POCT GLUCOSE: 123 MG/DL (ref 70–110)
POCT GLUCOSE: 139 MG/DL (ref 70–110)
POCT GLUCOSE: 27 MG/DL (ref 70–110)
POCT GLUCOSE: 96 MG/DL (ref 70–110)
POTASSIUM SERPL-SCNC: 4 MMOL/L (ref 3.5–5.1)
PROT SERPL-MCNC: 6.7 G/DL (ref 6–8.4)
PROT UR QL STRIP: ABNORMAL
RBC # BLD AUTO: 3.06 M/UL (ref 4.6–6.2)
RBC #/AREA URNS AUTO: 9 /HPF (ref 0–4)
SAMPLE: ABNORMAL
SITE: ABNORMAL
SODIUM SERPL-SCNC: 139 MMOL/L (ref 136–145)
SP GR UR STRIP: 1.01 (ref 1–1.03)
SQUAMOUS #/AREA URNS AUTO: 1 /HPF
TROPONIN I SERPL DL<=0.01 NG/ML-MCNC: 0.02 NG/ML (ref 0–0.03)
URN SPEC COLLECT METH UR: ABNORMAL
WBC # BLD AUTO: 7.5 K/UL (ref 3.9–12.7)
WBC #/AREA URNS AUTO: 87 /HPF (ref 0–5)

## 2023-10-02 PROCEDURE — 93005 ELECTROCARDIOGRAM TRACING: CPT

## 2023-10-02 PROCEDURE — 25000003 PHARM REV CODE 250

## 2023-10-02 PROCEDURE — 25000003 PHARM REV CODE 250: Performed by: STUDENT IN AN ORGANIZED HEALTH CARE EDUCATION/TRAINING PROGRAM

## 2023-10-02 PROCEDURE — 36556 INSERT NON-TUNNEL CV CATH: CPT

## 2023-10-02 PROCEDURE — 99291 PR CRITICAL CARE, E/M 30-74 MINUTES: ICD-10-PCS | Mod: GC,,, | Performed by: INTERNAL MEDICINE

## 2023-10-02 PROCEDURE — 80053 COMPREHEN METABOLIC PANEL: CPT | Performed by: EMERGENCY MEDICINE

## 2023-10-02 PROCEDURE — 82962 GLUCOSE BLOOD TEST: CPT

## 2023-10-02 PROCEDURE — 99285 EMERGENCY DEPT VISIT HI MDM: CPT | Mod: 25

## 2023-10-02 PROCEDURE — 93010 ELECTROCARDIOGRAM REPORT: CPT | Mod: ,,, | Performed by: INTERNAL MEDICINE

## 2023-10-02 PROCEDURE — 25000003 PHARM REV CODE 250: Performed by: EMERGENCY MEDICINE

## 2023-10-02 PROCEDURE — 63600175 PHARM REV CODE 636 W HCPCS: Performed by: STUDENT IN AN ORGANIZED HEALTH CARE EDUCATION/TRAINING PROGRAM

## 2023-10-02 PROCEDURE — 87186 SC STD MICRODIL/AGAR DIL: CPT | Performed by: INTERNAL MEDICINE

## 2023-10-02 PROCEDURE — 99291 CRITICAL CARE FIRST HOUR: CPT | Mod: GC,,, | Performed by: INTERNAL MEDICINE

## 2023-10-02 PROCEDURE — 83605 ASSAY OF LACTIC ACID: CPT

## 2023-10-02 PROCEDURE — 12000002 HC ACUTE/MED SURGE SEMI-PRIVATE ROOM

## 2023-10-02 PROCEDURE — 84100 ASSAY OF PHOSPHORUS: CPT | Performed by: EMERGENCY MEDICINE

## 2023-10-02 PROCEDURE — 87150 DNA/RNA AMPLIFIED PROBE: CPT | Performed by: INTERNAL MEDICINE

## 2023-10-02 PROCEDURE — 84484 ASSAY OF TROPONIN QUANT: CPT | Performed by: EMERGENCY MEDICINE

## 2023-10-02 PROCEDURE — 25000003 PHARM REV CODE 250: Performed by: INTERNAL MEDICINE

## 2023-10-02 PROCEDURE — 63600175 PHARM REV CODE 636 W HCPCS: Performed by: INTERNAL MEDICINE

## 2023-10-02 PROCEDURE — 87040 BLOOD CULTURE FOR BACTERIA: CPT | Mod: 59 | Performed by: INTERNAL MEDICINE

## 2023-10-02 PROCEDURE — 81001 URINALYSIS AUTO W/SCOPE: CPT | Performed by: EMERGENCY MEDICINE

## 2023-10-02 PROCEDURE — 87086 URINE CULTURE/COLONY COUNT: CPT | Performed by: EMERGENCY MEDICINE

## 2023-10-02 PROCEDURE — 96366 THER/PROPH/DIAG IV INF ADDON: CPT

## 2023-10-02 PROCEDURE — 85025 COMPLETE CBC W/AUTO DIFF WBC: CPT | Performed by: EMERGENCY MEDICINE

## 2023-10-02 PROCEDURE — 96365 THER/PROPH/DIAG IV INF INIT: CPT

## 2023-10-02 PROCEDURE — 99900035 HC TECH TIME PER 15 MIN (STAT)

## 2023-10-02 PROCEDURE — 87154 CUL TYP ID BLD PTHGN 6+ TRGT: CPT | Performed by: INTERNAL MEDICINE

## 2023-10-02 PROCEDURE — 83735 ASSAY OF MAGNESIUM: CPT | Performed by: EMERGENCY MEDICINE

## 2023-10-02 PROCEDURE — 93010 EKG 12-LEAD: ICD-10-PCS | Mod: ,,, | Performed by: INTERNAL MEDICINE

## 2023-10-02 PROCEDURE — 87077 CULTURE AEROBIC IDENTIFY: CPT | Mod: 59 | Performed by: INTERNAL MEDICINE

## 2023-10-02 PROCEDURE — 63600175 PHARM REV CODE 636 W HCPCS

## 2023-10-02 PROCEDURE — 96375 TX/PRO/DX INJ NEW DRUG ADDON: CPT

## 2023-10-02 RX ORDER — METRONIDAZOLE 500 MG/1
500 TABLET ORAL EVERY 8 HOURS
Status: ON HOLD | COMMUNITY
End: 2023-10-04

## 2023-10-02 RX ORDER — ACETAMINOPHEN 325 MG/1
650 TABLET ORAL EVERY 6 HOURS PRN
Status: ON HOLD | COMMUNITY

## 2023-10-02 RX ORDER — DEXTROSE 50 % IN WATER (D50W) INTRAVENOUS SYRINGE
25
Status: COMPLETED | OUTPATIENT
Start: 2023-10-02 | End: 2023-10-02

## 2023-10-02 RX ORDER — HYDRALAZINE HYDROCHLORIDE 25 MG/1
25 TABLET, FILM COATED ORAL EVERY 8 HOURS
Status: DISCONTINUED | OUTPATIENT
Start: 2023-10-02 | End: 2023-10-02

## 2023-10-02 RX ORDER — LINEZOLID 2 MG/ML
600 INJECTION, SOLUTION INTRAVENOUS
Status: DISCONTINUED | OUTPATIENT
Start: 2023-10-02 | End: 2023-10-02

## 2023-10-02 RX ORDER — HYDRALAZINE HYDROCHLORIDE 20 MG/ML
10 INJECTION INTRAMUSCULAR; INTRAVENOUS ONCE
Status: COMPLETED | OUTPATIENT
Start: 2023-10-02 | End: 2023-10-02

## 2023-10-02 RX ORDER — TRIAMCINOLONE ACETONIDE 0.25 MG/G
OINTMENT TOPICAL 2 TIMES DAILY PRN
Status: ON HOLD | COMMUNITY

## 2023-10-02 RX ORDER — DEXTROSE MONOHYDRATE 100 MG/ML
INJECTION, SOLUTION INTRAVENOUS CONTINUOUS
Status: DISCONTINUED | OUTPATIENT
Start: 2023-10-02 | End: 2023-10-02

## 2023-10-02 RX ORDER — CLOPIDOGREL BISULFATE 75 MG/1
75 TABLET ORAL DAILY
Status: DISCONTINUED | OUTPATIENT
Start: 2023-10-03 | End: 2023-10-11

## 2023-10-02 RX ORDER — ATORVASTATIN CALCIUM 40 MG/1
80 TABLET, FILM COATED ORAL NIGHTLY
Status: DISCONTINUED | OUTPATIENT
Start: 2023-10-02 | End: 2023-10-19 | Stop reason: HOSPADM

## 2023-10-02 RX ORDER — DEXTROSE MONOHYDRATE 100 MG/ML
INJECTION, SOLUTION INTRAVENOUS CONTINUOUS
Status: DISCONTINUED | OUTPATIENT
Start: 2023-10-02 | End: 2023-10-04

## 2023-10-02 RX ORDER — DIPHENHYDRAMINE HCL 25 MG
CAPSULE ORAL
Status: ON HOLD | COMMUNITY
End: 2024-03-17 | Stop reason: HOSPADM

## 2023-10-02 RX ORDER — DOBUTAMINE HYDROCHLORIDE 400 MG/100ML
2.5 INJECTION INTRAVENOUS CONTINUOUS
Status: DISCONTINUED | OUTPATIENT
Start: 2023-10-02 | End: 2023-10-04

## 2023-10-02 RX ORDER — HYDRALAZINE HYDROCHLORIDE 50 MG/1
50 TABLET, FILM COATED ORAL EVERY 8 HOURS
Status: DISCONTINUED | OUTPATIENT
Start: 2023-10-03 | End: 2023-10-03

## 2023-10-02 RX ORDER — SODIUM HYPOCHLORITE 5 MG/ML
SOLUTION TOPICAL DAILY
Status: ON HOLD | COMMUNITY
End: 2024-03-17 | Stop reason: HOSPADM

## 2023-10-02 RX ORDER — SODIUM CHLORIDE 0.9 % (FLUSH) 0.9 %
10 SYRINGE (ML) INJECTION
Status: DISCONTINUED | OUTPATIENT
Start: 2023-10-02 | End: 2023-10-19 | Stop reason: HOSPADM

## 2023-10-02 RX ORDER — MULTIVITAMIN
1 TABLET ORAL DAILY
Status: ON HOLD | COMMUNITY

## 2023-10-02 RX ORDER — HYDRALAZINE HYDROCHLORIDE 25 MG/1
25 TABLET, FILM COATED ORAL ONCE
Status: COMPLETED | OUTPATIENT
Start: 2023-10-02 | End: 2023-10-02

## 2023-10-02 RX ORDER — BIOTIN 10 MG
1 TABLET ORAL 2 TIMES DAILY
Status: ON HOLD | COMMUNITY

## 2023-10-02 RX ADMIN — DEXTROSE MONOHYDRATE: 10 INJECTION, SOLUTION INTRAVENOUS at 04:10

## 2023-10-02 RX ADMIN — PIPERACILLIN AND TAZOBACTAM 4.5 G: 4; .5 INJECTION, POWDER, LYOPHILIZED, FOR SOLUTION INTRAVENOUS; PARENTERAL at 05:10

## 2023-10-02 RX ADMIN — SODIUM CHLORIDE 1000 ML: 9 INJECTION, SOLUTION INTRAVENOUS at 05:10

## 2023-10-02 RX ADMIN — HYDRALAZINE HYDROCHLORIDE 25 MG: 25 TABLET, FILM COATED ORAL at 08:10

## 2023-10-02 RX ADMIN — ATORVASTATIN CALCIUM 80 MG: 40 TABLET, FILM COATED ORAL at 08:10

## 2023-10-02 RX ADMIN — APIXABAN 5 MG: 5 TABLET, FILM COATED ORAL at 08:10

## 2023-10-02 RX ADMIN — Medication 25 G: at 01:10

## 2023-10-02 RX ADMIN — HYDRALAZINE HYDROCHLORIDE 25 MG: 25 TABLET, FILM COATED ORAL at 06:10

## 2023-10-02 RX ADMIN — DOBUTAMINE HYDROCHLORIDE 5 MCG/KG/MIN: 400 INJECTION INTRAVENOUS at 04:10

## 2023-10-02 RX ADMIN — HYDRALAZINE HYDROCHLORIDE 10 MG: 20 INJECTION INTRAMUSCULAR; INTRAVENOUS at 07:10

## 2023-10-02 RX ADMIN — VANCOMYCIN HYDROCHLORIDE 2000 MG: 500 INJECTION, POWDER, LYOPHILIZED, FOR SOLUTION INTRAVENOUS at 06:10

## 2023-10-02 RX ADMIN — DEXTROSE MONOHYDRATE: 100 INJECTION, SOLUTION INTRAVENOUS at 01:10

## 2023-10-02 NOTE — ASSESSMENT & PLAN NOTE
Came from NH with IV linezolid, cefepime, and metronidazole  ID consultation for assistance in management

## 2023-10-02 NOTE — ASSESSMENT & PLAN NOTE
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

## 2023-10-02 NOTE — PROCEDURES
Saji Castañeda is a 73 y.o. male patient.    Temp: 98.4 °F (36.9 °C) (10/02/23 1113)  Pulse: (!) 43 (10/02/23 1642)  Resp: (!) 22 (10/02/23 1642)  BP: (!) 193/80 (10/02/23 1642)  SpO2: 100 % (10/02/23 1642)  Weight: 117.9 kg (260 lb) (10/02/23 1550)       Cordis Insertion    Date/Time: 10/2/2023 5:12 PM    Performed by: Adama Lara MD  Authorized by: Adama Lara MD    Location procedure was performed:  [facility] EMERGENCY DEPARTMENT  Pre-operative diagnosis:  Sepsis, CHB  Post-operative diagnosis:  Sepsis, CHB  Consent Done ?:  Emergent Situation  Time out complete?: Verified correct patient, procedure, equipment, staff, and site/side    Indications:  Med administration, vascular access and hemodynamic monitoring  Anesthesia:  Local infiltration  Local anesthetic:  Lidocaine 1% without epinephrine  Anesthetic total (ml):  2  Preparation:  Skin prepped with ChloraPrep  Skin prep agent dried: Skin prep agent completely dried prior to procedure    Sterile barriers: All five maximal sterile barriers used - gloves, gown, cap, mask and large sterile sheet    Hand hygiene: Hand hygiene performed immediately prior to central venous catheter insertion    Location:  Right internal jugular  Catheter size:  8.5 Fr  Inserted Catheter Length (cm):  10  Ultrasound guidance: Yes    Vessel Caliber:  Small   patent  Comprressibility:  Normal  Needle advanced into vessel with real time ultrasound guidance.    Guidewire confirmed in vessel.    Image recorded and saved.    Steril sheath on probe.    Sterile gel used.  Manometry: No    Number of attempts:  1  Securement:  Line sutured, chlorhexidine patch, sterile dressing applied and blood return through all ports  Complications: No    Estimated blood loss (mL):  2  Specimens: No    Implants: No    Other Complications:  CXR pending, will follow   CXR pending, will followTermination Site: superior vena cava  Please call with questions or concerns.     Adama Lara MD  Cardiology  PGY6  [facility]  10/2/2023

## 2023-10-02 NOTE — ED PROVIDER NOTES
"Chief Complaint   Hypotension (From [facility]. Hypotensive per staff with systolic in the 60s, normotensive per ems with systolic in the 180s with no intervention. Pt currently on IV antibx for an infection)      History Of Present Illness   Saji Castañeda is a 73 y.o. male presenting with generalized weakness and hypotension.  The states patient states he has felt weak and terrible for a few days at least, but is not clear as to when this worsened.  He is current on antibiotics via PICC line.  He was hypotensive at [facility] this morning, so he was sent here for further evaluation.  Currently, his systolic blood pressure is 193.        Review of patient's allergies indicates:  No Known Allergies    No current facility-administered medications on file prior to encounter.     Current Outpatient Medications on File Prior to Encounter   Medication Sig Dispense Refill    apixaban (ELIQUIS) 5 mg Tab Take 1 tablet (5 mg total) by mouth 2 (two) times daily. 60 tablet 0    ascorbic acid, vitamin C, (VITAMIN C) 500 MG tablet Take 500 mg by mouth once daily.      B COMPLEX-VITAMIN B12 tablet Take 1 tablet by mouth once daily.      BD ULTRA-FINE ADITYA PEN NEEDLE 32 gauge x 5/32" Ndle USE FOUR TIMES DAILY WITH MEALS AND NIGHTLY 100 each 0    blood sugar diagnostic (CONTOUR TEST STRIPS) Strp Inject 1 strip into the skin 2 (two) times daily before meals. 100 strip 6    clopidogreL (PLAVIX) 75 mg tablet Take 1 tablet (75 mg total) by mouth once daily. 60 tablet 0    furosemide (LASIX) 40 MG tablet Take 40 mg by mouth once daily.      gabapentin (NEURONTIN) 100 MG capsule Take 2 capsules (200 mg total) by mouth 2 (two) times daily. (Patient taking differently: Take 300 mg by mouth 2 (two) times daily.) 120 capsule 1    glipiZIDE (GLUCOTROL) 10 MG tablet Take 20 mg by mouth 2 (two) times a day.      hydrALAZINE (APRESOLINE) 25 MG tablet Take 1 tablet (25 mg total) by mouth 3 (three) times daily. 90 tablet 11    insulin aspart " "U-100 (NOVOLOG) 100 unit/mL (3 mL) InPn pen Inject 12 Units into the skin 3 (three) times daily with meals. 30 mL 3    isosorbide dinitrate (ISORDIL) 10 MG tablet Take 1 tablet (10 mg total) by mouth 3 (three) times daily. 90 tablet 11    LANTUS SOLOSTAR U-100 INSULIN glargine 100 units/mL SubQ pen Inject 45 Units into the skin every evening.  0    losartan-hydrochlorothiazide 100-25 mg (HYZAAR) 100-25 mg per tablet Take 1 tablet by mouth once daily.      metFORMIN (GLUCOPHAGE-XR) 500 MG ER 24hr tablet Take 1,000 mg by mouth 2 (two) times a day.      MICROLET LANCET Misc   0    NIFEdipine (ADALAT CC) 60 MG TbSR Take 60 mg by mouth once daily.      pantoprazole (PROTONIX) 40 MG tablet Take 40 mg by mouth once daily.      pen needle, diabetic 32 gauge x 5/32" Ndle use as directed with insulin 100 each 2    rosuvastatin (CRESTOR) 40 MG Tab Take 40 mg by mouth once daily.      tamsulosin (FLOMAX) 0.4 mg Cap Take 0.4 mg by mouth once daily.      [DISCONTINUED] hydroCHLOROthiazide (HYDRODIURIL) 25 MG tablet Take 0.5 tablets (12.5 mg total) by mouth every Mon, Wed, Fri. Beginning on 7/4/2022         Past History   As per HPI and below:  Past Medical History:   Diagnosis Date    Arthritis     legs    Diabetes mellitus     Diabetes mellitus, type 2     Hyperlipidemia     Hypertension     Osteomyelitis      Past Surgical History:   Procedure Laterality Date    ANGIOGRAPHY OF LOWER EXTREMITY N/A 2/3/2021    Procedure: Angiogram Extremity Bilateral;  Surgeon: Ernst Chacko MD;  Location: [facility] OR [address] [place] [address];  Service: Peripheral Vascular;  Laterality: N/A;  7.4 mintues fluoroscopy time  816.15 mGy  170.17 Gycm2    AORTOGRAPHY WITH EXTREMITY RUNOFF Bilateral 2/3/2021    Procedure: AORTOGRAM, WITH EXTREMITY RUNOFF;  Surgeon: Ernst Chacko MD;  Location: [facility] OR [address] [place] [address];  Service: Peripheral Vascular;  Laterality: Bilateral;    DEBRIDEMENT OF FOOT Left 2/23/2021    Procedure: DEBRIDEMENT, LEFT HEEL;  Surgeon: Mayra PRUITT" DAYNA Schroeder;  Location: [address];  Service: Podiatry;  Laterality: Left;    FOOT AMPUTATION  October 2010    left high midfoot amputation       Social History     Socioeconomic History    Marital status: Single   Tobacco Use    Smoking status: Never    Smokeless tobacco: Never   Substance and Sexual Activity    Alcohol use: No     Comment: occassional    Drug use: No    Sexual activity: Not Currently   Social History Narrative    Not currently working; lives with family     Social Determinants of Health     Financial Resource Strain: Low Risk  (5/23/2023)    Overall Financial Resource Strain (CARDIA)     Difficulty of Paying Living Expenses: Not very hard   Food Insecurity: No Food Insecurity (5/23/2023)    Hunger Vital Sign     Worried About Running Out of Food in the Last Year: Never true     Ran Out of Food in the Last Year: Never true   Transportation Needs: No Transportation Needs (5/23/2023)    PRAPARE - Transportation     Lack of Transportation (Medical): No     Lack of Transportation (Non-Medical): No   Recent Concern: Transportation Needs - Unmet Transportation Needs (5/15/2023)    PRAPARE - Transportation     Lack of Transportation (Medical): Yes     Lack of Transportation (Non-Medical): Yes   Physical Activity: Inactive (5/23/2023)    Exercise Vital Sign     Days of Exercise per Week: 0 days     Minutes of Exercise per Session: 0 min   Stress: Stress Concern Present (5/23/2023)    [employer] - Occupational Stress Questionnaire     Feeling of Stress : Rather much   Social Connections: Socially Isolated (5/23/2023)    Social Connection and Isolation Panel [NHANES]     Frequency of Communication with Friends and Family: Once a week     Frequency of Social Gatherings with Friends and Family: Once a week     Attends [employer] Services: Never     Active Member of Clubs or Organizations: No     Attends Club or Organization Meetings: Never     Marital Status: Never     Housing Stability: Low Risk  (5/23/2023)    Housing Stability Vital Sign     Unable to Pay for Housing in the Last Year: No     Number of Places Lived in the Last Year: 1     Unstable Housing in the Last Year: No       Family History   Problem Relation Age of Onset    Diabetes Mother     Heart disease Mother     Stroke Sister     Cancer Brother     Diabetes Sister        Physical Exam     Vitals:    10/02/23 1334 10/02/23 1413 10/02/23 1414 10/02/23 1415   BP:   (!) 189/85    Pulse: (!) 44 (!) 41  (!) 42   Resp: (!) 23 20  19   Temp:       TempSrc:       SpO2: 100% 100%  100%     Appearance: No acute distress.  Skin: Small amount of scratch-related excoriations to both buttocks, no decubitus or cellulitis noted.  Good turgor.  No abrasions.  No ecchymoses.  Eyes: No conjunctival injection.  ENT: Oropharynx clear.    Chest: Clear to auscultation bilaterally.  Good air movement.  No wheezes.  No rhonchi.  Cardiovascular: Regular rate and rhythm.  No murmurs. No gallops. No rubs.  Abdomen: Soft.  Not distended.  Nontender.  No guarding.  No rebound.  Musculoskeletal: Good range of motion all joints.  No deformities.  Neck supple.  No meningismus.  Neurologic: Motor intact.  Sensation intact.   Cranial nerves intact.  Mental Status:  Alert and oriented x 3.  Appropriate, conversant.        Initial MDM   Generalized weakness with reported hypotension at the nursing home.  However, the patient is hypertensive here.  He he states the weakness is no different than it was this morning and has been present for a few days.  Given his comorbidities, will do a workup to see if there is any underlying worsening symptoms that we are missing.  It also may be just a spurious blood pressure reading.  Vasovagal event is in the differential as well.  Patient will require hospitalization if labs are concerning or hypotension returns.        Medications Given     Medications   dextrose 10 % infusion ( Intravenous Canceled Entry  10/2/23 1328)   dextrose 50 % in water (D50W) injection 25 g (25 g Intravenous Given 10/2/23 1318)       Results and Course     Labs Reviewed   CBC W/ AUTO DIFFERENTIAL - Abnormal; Notable for the following components:       Result Value    RBC 3.06 (*)     Hemoglobin 8.5 (*)     Hematocrit 26.9 (*)     MCHC 31.6 (*)     RDW 16.7 (*)     Gran % 74.0 (*)     Lymph % 13.6 (*)     All other components within normal limits   COMPREHENSIVE METABOLIC PANEL - Abnormal; Notable for the following components:    Glucose 43 (*)     BUN 48 (*)     Creatinine 2.6 (*)     Albumin 2.3 (*)     Alkaline Phosphatase 38 (*)     eGFR 25.1 (*)     All other components within normal limits   ISTAT LACTATE - Abnormal; Notable for the following components:    POC Lactate 2.29 (*)     All other components within normal limits   POCT GLUCOSE - Abnormal; Notable for the following components:    POCT Glucose 27 (*)     All other components within normal limits   POCT GLUCOSE - Abnormal; Notable for the following components:    POCT Glucose 123 (*)     All other components within normal limits   MAGNESIUM   PHOSPHORUS   TROPONIN I   URINALYSIS, REFLEX TO URINE CULTURE       Imaging Results    None       EKG shows heart rate of 42, complete heart block, neuro complex QRS, no acute ischemia.  Second EKG similar.  EKG from May per my chart review shows 2-1 AV block versus complete heart block, similar in appearance to today.    ED Course as of 10/02/23 1453   Mon Oct 02, 2023   1314 POC Lactate(!): 2.29 [DC]   1314 Glucose(!!): 43  Will start d10 and feed patient [DC]   1314 Magnesium : 1.8 [DC]   1315 Phosphorus: 3.3 [DC]   1315 Creatinine(!): 2.6  Baseline 1.4 [DC]   1315 WBC: 7.50 [DC]   1315 Hemoglobin(!): 8.5  baseline [DC]   1315 Platelets: 184 [DC]      ED Course User Index  [DC] Syemour Pagan MD     Critical Care   Date: 10/02/2023  Performed by: Seymour Pagan MD   Authorized by: Seymour Pagan MD    Total critical care time  (exclusive of procedural time) : 45 minutes  Critical care was necessary to treat or prevent imminent or life-threatening deterioration of the following conditions:  heart block        MDM, Impression and Plan   73 y.o. male with complete heart block, hypoglycemia, YNES.  Discussed with hospital medicine and cardiology.  If cardiology plans TVP/pacemaker, they will take to CCU, otherwise will admit to hospital medicine/telemetry.  I think he has had the block for months given the EKG from May.  Hypoglycemia treated with D50 and D10, will continue to monitor.         Final diagnoses:  [R53.1] Weakness  [E16.2] Hypoglycemia (Primary)  [N17.9] YNES (acute kidney injury)  [I44.2] Complete heart block        ED Disposition Condition    Observation Stable                  Seymour Pagan MD  10/02/23 1595

## 2023-10-02 NOTE — ASSESSMENT & PLAN NOTE
ECGs with atrial fibrillation with slow VR in the past  On ELiquis for VTE of the RUE as well  CHADS2-VASc 4

## 2023-10-02 NOTE — Clinical Note
"  Subjective    Daphnie Jay is a 63 y.o. female. she is here today for follow-up.    History of Present Illness       REASON - Diabetes     Duration 10 years      Timing - Diabetes is Constant     Quality - Uncontrolled - better controlled      Severity - moderate     Complications - none     Current symptoms/problems  polydipsia and polyuria - no longer      Alleviating Factors: Compliance      Side Effects none     Current diet  in general, an \"unhealthy\" diet - improved diet      Current exercise none     Current monitoring regimen: home blood tests - 3 times daily      Lab Results   Component Value Date    HGBA1C 10.70 (H) 12/10/2019             Home blood sugar records:       States has been stressed and sugars are higher      Hypoglycemia none         The following portions of the patient's history were reviewed and updated as appropriate:   Past Medical History:   Diagnosis Date   • Type 2 diabetes mellitus without complication (CMS/[facility]) 8/20/2016     Past Surgical History:   Procedure Laterality Date   • FRACTURE SURGERY Left      Family History   Problem Relation Age of Onset   • Diabetes Mother      OB History    None       Current Outpatient Medications   Medication Sig Dispense Refill   • aspirin 81 MG chewable tablet Chew 81 mg daily.     • Canagliflozin 100 MG tablet One tablet daily by mouth before breakfast 90 tablet 3   • glucose blood test strip One touch ultra blue test strips to test 4 times a day  Dx-e11.9 450 each 3   • Insulin Glargine-Lixisenatide (SOLIQUA) 100-33 UNT-MCG/ML solution pen-injector injection Inject 60 Units under the skin into the appropriate area as directed Daily. 15 pen 11   • Insulin Lispro (HUMALOG) 100 UNIT/ML solution pen-injector 30 units with meals 9 pen 11   • Insulin Pen Needle (B-D UF III MINI PEN NEEDLES) 31G X 5 MM misc Use 4 times daily  Dx-e11.9 360 each 3   • lisinopril (PRINIVIL,ZESTRIL) 5 MG tablet   0   • rosuvastatin (CRESTOR) 20 MG tablet Take 1 " The wire was removed from the right femoral vein. tablet by mouth Daily. 30 tablet 11   • insulin aspart (NOVOLOG FLEXPEN) 100 UNIT/ML solution pen-injector sc pen Injecting 30 up to 50 TID for meals 7 pen 11     No current facility-administered medications for this visit.      Allergies   Allergen Reactions   • Demerol [Meperidine] Shortness Of Breath     Social History     Socioeconomic History   • Marital status:      Spouse name: Not on file   • Number of children: Not on file   • Years of education: Not on file   • Highest education level: Not on file   Tobacco Use   • Smoking status: Never Smoker   • Smokeless tobacco: Never Used   Substance and Sexual Activity   • Alcohol use: No       Review of Systems  Review of Systems   Constitutional: Negative for activity change, appetite change, diaphoresis and fatigue.   HENT: Negative for facial swelling, sneezing, sore throat, tinnitus, trouble swallowing and voice change.    Eyes: Negative for photophobia, pain, discharge, redness, itching and visual disturbance.   Respiratory: Negative for apnea, cough, choking, chest tightness and shortness of breath.    Cardiovascular: Negative for chest pain, palpitations and leg swelling.   Gastrointestinal: Negative for abdominal distention, abdominal pain, constipation, diarrhea, nausea and vomiting.   Endocrine: Negative for cold intolerance, heat intolerance, polydipsia, polyphagia and polyuria.   Genitourinary: Negative for difficulty urinating, dysuria, frequency, hematuria and urgency.   Musculoskeletal: Negative for arthralgias, back pain, gait problem, joint swelling, myalgias, neck pain and neck stiffness.   Skin: Negative for color change, pallor, rash and wound.   Neurological: Negative for dizziness, tremors, weakness, light-headedness, numbness and headaches.   Hematological: Negative for adenopathy. Does not bruise/bleed easily.   Psychiatric/Behavioral: Negative for behavioral problems, confusion and sleep disturbance.        Objective    /74    "Pulse 85   Ht 160 cm (63\")   Wt 113 kg (250 lb)   SpO2 98%   BMI 44.29 kg/m²   Physical Exam   Constitutional: She is oriented to person, place, and time. She appears well-developed and well-nourished. No distress.   HENT:   Head: Normocephalic and atraumatic.   Right Ear: External ear normal.   Left Ear: External ear normal.   Nose: Nose normal.   Eyes: Pupils are equal, round, and reactive to light. Conjunctivae and EOM are normal.   Neck: Normal range of motion. Neck supple. No tracheal deviation present. No thyromegaly present.   Cardiovascular: Normal rate, regular rhythm and normal heart sounds.   No murmur heard.  Pulmonary/Chest: Effort normal and breath sounds normal. No respiratory distress. She has no wheezes.   Abdominal: Soft. Bowel sounds are normal. There is no tenderness. There is no rebound and no guarding.   Musculoskeletal: Normal range of motion. She exhibits no edema, tenderness or deformity.   Neurological: She is alert and oriented to person, place, and time. No cranial nerve deficit.   Skin: Skin is warm and dry. No rash noted.   Psychiatric: She has a normal mood and affect. Her behavior is normal. Judgment and thought content normal.       Lab Review  Glucose (mg/dL)   Date Value   12/10/2019 196 (H)   12/17/2018 216 (H)   02/13/2018 124 (H)     Sodium (mmol/L)   Date Value   12/10/2019 143   12/17/2018 137   02/13/2018 141     Potassium (mmol/L)   Date Value   12/10/2019 4.0   12/17/2018 4.4   02/13/2018 4.1     Chloride (mmol/L)   Date Value   12/10/2019 102   12/17/2018 101   02/13/2018 102     CO2 (mmol/L)   Date Value   12/10/2019 20.1 (L)   12/17/2018 22.0   02/13/2018 24.0     BUN (mg/dL)   Date Value   12/10/2019 15   12/17/2018 15   02/13/2018 12     Creatinine (mg/dL)   Date Value   12/10/2019 0.71   12/17/2018 0.72   02/13/2018 0.54     Hemoglobin A1C (%)   Date Value   12/10/2019 10.70 (H)   12/17/2018 9.0 (H)   02/13/2018 9.3 (H)   10/19/2017 8.3 (H)     Triglycerides "   Date Value   12/10/2019 198 mg/dL (H)   10/19/2017 147 mg/dL   12/22/2016 205 mg/dl (H)     LDL Cholesterol    Date Value   12/10/2019 101 mg/dL (H)   10/19/2017 98 mg/dL   12/22/2016 161 mg/dl (H)       Assessment/Plan      1. Type 2 diabetes mellitus without complication, with long-term current use of insulin (CMS/[facility])    2. Mixed hyperlipidemia due to type 2 diabetes mellitus (CMS/[facility])    3. Hypertension associated with diabetes (CMS/[facility])    4. Vitamin D deficiency    .    Medications prescribed:  Outpatient Encounter Medications as of 12/11/2019   Medication Sig Dispense Refill   • aspirin 81 MG chewable tablet Chew 81 mg daily.     • Canagliflozin 100 MG tablet One tablet daily by mouth before breakfast 90 tablet 3   • glucose blood test strip One touch ultra blue test strips to test 4 times a day  Dx-e11.9 450 each 3   • Insulin Glargine-Lixisenatide (SOLIQUA) 100-33 UNT-MCG/ML solution pen-injector injection Inject 60 Units under the skin into the appropriate area as directed Daily. 15 pen 11   • Insulin Lispro (HUMALOG) 100 UNIT/ML solution pen-injector 30 units with meals 9 pen 11   • Insulin Pen Needle (B-D UF III MINI PEN NEEDLES) 31G X 5 MM misc Use 4 times daily  Dx-e11.9 360 each 3   • lisinopril (PRINIVIL,ZESTRIL) 5 MG tablet   0   • rosuvastatin (CRESTOR) 20 MG tablet Take 1 tablet by mouth Daily. 30 tablet 11   • [DISCONTINUED] Canagliflozin 100 MG tablet One tablet daily by mouth before breakfast 90 tablet 3   • [DISCONTINUED] Insulin Glargine-Lixisenatide 100-33 UNT-MCG/ML solution pen-injector Inject 60 Units under the skin into the appropriate area as directed Daily. 15 pen 11   • [DISCONTINUED] lovastatin (MEVACOR) 40 MG tablet   0   • insulin aspart (NOVOLOG FLEXPEN) 100 UNIT/ML solution pen-injector sc pen Injecting 30 up to 50 TID for meals 7 pen 11     No facility-administered encounter medications on file as of 12/11/2019.        Orders placed during this encounter include:  Orders  Placed This Encounter   Procedures   • Comprehensive Metabolic Panel   • Hemoglobin A1c   • Lipid Panel   • Protein / Creatinine Ratio, Urine - Urine, Clean Catch   • Microalbumin / Creatinine Urine Ratio - Urine, Clean Catch   • TSH   • Vitamin D 25 Hydroxy   • CBC & Differential     Order Specific Question:   Manual Differential     Answer:   No      Type 2 Diabetes Mellitus      Lab Results   Component Value Date    HGBA1C 10.70 (H) 12/10/2019                Metformin , can't take , diarrhea , even ER      Soliqua - 50 units ----increase to 55 units      Humalog ---changed to Novolog per insurance       3 units per carb ----  Increase to 4 units per carb       and 2 per 50       invokana 100 mg daily ----called in today      Scarring , use afrezza instead         ---     Has had pneumovax before age 64  Flu Vaccine in 2016  --     Microvascular Monitoring     Yearly eye exam      Neuropathy-- bilateral feet      No medication requested at this time                  Morbid obesity due to excess calories      Patient's Body mass index is 44.29 kg/m². BMI is above normal parameters. Recommendations include: educational material.      Decrease daily caloric intake by 500 calories per day           Mixed hyperlipidemia due to type 2 diabetes mellitus         crestor 20 mg at night       Total Cholesterol   Date Value Ref Range Status   12/10/2019 185 0 - 200 mg/dL Final     Triglycerides   Date Value Ref Range Status   12/10/2019 198 (H) 0 - 150 mg/dL Final     HDL Cholesterol   Date Value Ref Range Status   12/10/2019 44 40 - 60 mg/dL Final     LDL Cholesterol    Date Value Ref Range Status   12/10/2019 101 (H) 0 - 100 mg/dL Final           Hypertension associated with diabetes          On lisinopril 5 mg daily           Vitamin d def.          Start taking vitamin d 5000 units daily         Component      Latest Ref Rng & Units 12/10/2019   25 Hydroxy, Vitamin D      30.0 - 100.0 ng/ml 19.1 (L)         4. Follow-up:  Return in about 3 months (around 3/11/2020) for Recheck.

## 2023-10-02 NOTE — H&P
"[facility] - [facility] Dept  Cardiology  History and Physical     Patient Name: Saji Castañeda  MRN: [id number]  Admission Date: 10/2/2023  Code Status: Full Code   Attending Provider: Blanca Marin MD   Primary Care Physician: [facility] -  Principal Problem:Complete heart block    Patient information was obtained from patient, past medical records and ER records.     Subjective:     Chief Complaint:  Weakness, "feel like I'm going to die"     HPI:  Patient is a 73 year old male with PVD c/b chronic bilateral OM with left foot amputation, paroxysmal atrial fibrillation on Eliquis, T2DM c/b neuropathy, HTN, CKD, PVD, VTE of RUE in June 2022, and asymptomatic Mobitz Type 1 AV block. The patient presented from [facility] due to concerns of weakness and feeling like he was going to die. Patient reports that he does not feel any dizziness or headaches on the time of admission encounter. He states he knows his name and where he is and the current year (and accurately reports "Saji Castañeda" and "[facility]" and "2023") but had trouble remembering which nursing home he came from (he said he forgot).   CBG on arrival was in the 20s, given dextrose in ED, CBG improved to 96 mg/dL.  YNES (baseline Cr 1.3) to 2.6  Lactic acid elevation 2.29      Past Medical History:   Diagnosis Date    Arthritis     legs    Diabetes mellitus     Diabetes mellitus, type 2     Hyperlipidemia     Hypertension     Osteomyelitis        Past Surgical History:   Procedure Laterality Date    ANGIOGRAPHY OF LOWER EXTREMITY N/A 2/3/2021    Procedure: Angiogram Extremity Bilateral;  Surgeon: Ernst Chacko MD;  Location: [facility] OR [address] [place] [address];  Service: Peripheral Vascular;  Laterality: N/A;  7.4 mintues fluoroscopy time  816.15 mGy  170.17 Gycm2    AORTOGRAPHY WITH EXTREMITY RUNOFF Bilateral 2/3/2021    Procedure: AORTOGRAM, WITH EXTREMITY RUNOFF;  Surgeon: Ernst Chacko MD;  Location: [facility] OR [address] [place] [address];  Service: " "Peripheral Vascular;  Laterality: Bilateral;    DEBRIDEMENT OF FOOT Left 2/23/2021    Procedure: DEBRIDEMENT, LEFT HEEL;  Surgeon: Mayra Schroeder DPM;  Location: [facility] OR [address] [place] [address];  Service: Podiatry;  Laterality: Left;    FOOT AMPUTATION  October 2010    left high midfoot amputation       Review of patient's allergies indicates:  No Known Allergies    No current facility-administered medications on file prior to encounter.     Current Outpatient Medications on File Prior to Encounter   Medication Sig    apixaban (ELIQUIS) 5 mg Tab Take 1 tablet (5 mg total) by mouth 2 (two) times daily.    ascorbic acid, vitamin C, (VITAMIN C) 500 MG tablet Take 500 mg by mouth once daily.    B COMPLEX-VITAMIN B12 tablet Take 1 tablet by mouth once daily.    BD ULTRA-FINE ADITYA PEN NEEDLE 32 gauge x 5/32" Ndle USE FOUR TIMES DAILY WITH MEALS AND NIGHTLY    blood sugar diagnostic (CONTOUR TEST STRIPS) Strp Inject 1 strip into the skin 2 (two) times daily before meals.    clopidogreL (PLAVIX) 75 mg tablet Take 1 tablet (75 mg total) by mouth once daily.    furosemide (LASIX) 40 MG tablet Take 40 mg by mouth once daily.    gabapentin (NEURONTIN) 100 MG capsule Take 2 capsules (200 mg total) by mouth 2 (two) times daily. (Patient taking differently: Take 300 mg by mouth 2 (two) times daily.)    glipiZIDE (GLUCOTROL) 10 MG tablet Take 20 mg by mouth 2 (two) times a day.    hydrALAZINE (APRESOLINE) 25 MG tablet Take 1 tablet (25 mg total) by mouth 3 (three) times daily.    insulin aspart U-100 (NOVOLOG) 100 unit/mL (3 mL) InPn pen Inject 12 Units into the skin 3 (three) times daily with meals.    isosorbide dinitrate (ISORDIL) 10 MG tablet Take 1 tablet (10 mg total) by mouth 3 (three) times daily.    LANTUS SOLOSTAR U-100 INSULIN glargine 100 units/mL SubQ pen Inject 45 Units into the skin every evening.    losartan-hydrochlorothiazide 100-25 mg (HYZAAR) 100-25 mg per tablet Take 1 tablet by mouth once daily.    " "metFORMIN (GLUCOPHAGE-XR) 500 MG ER 24hr tablet Take 1,000 mg by mouth 2 (two) times a day.    MICROLET LANCET Misc     NIFEdipine (ADALAT CC) 60 MG TbSR Take 60 mg by mouth once daily.    pantoprazole (PROTONIX) 40 MG tablet Take 40 mg by mouth once daily.    pen needle, diabetic 32 gauge x 5/32" Ndle use as directed with insulin    rosuvastatin (CRESTOR) 40 MG Tab Take 40 mg by mouth once daily.    tamsulosin (FLOMAX) 0.4 mg Cap Take 0.4 mg by mouth once daily.    [DISCONTINUED] hydroCHLOROthiazide (HYDRODIURIL) 25 MG tablet Take 0.5 tablets (12.5 mg total) by mouth every Mon, Wed, Fri. Beginning on 7/4/2022     Family History       Problem Relation (Age of Onset)    Cancer Brother    Diabetes Mother, Sister    Heart disease Mother    Stroke Sister          Tobacco Use    Smoking status: Never    Smokeless tobacco: Never   Substance and Sexual Activity    Alcohol use: No     Comment: occassional    Drug use: No    Sexual activity: Not Currently     Review of Systems   Constitutional: Negative for chills and fever.   Cardiovascular:  Negative for chest pain, orthopnea and palpitations.   Respiratory:  Negative for cough and shortness of breath.    Gastrointestinal:  Negative for abdominal pain.   Neurological:  Negative for dizziness, headaches and light-headedness.   Psychiatric/Behavioral:  Negative for altered mental status.      Objective:     Vital Signs (Most Recent):  Temp: 98.4 °F (36.9 °C) (10/02/23 1113)  Pulse: (!) 42 (10/02/23 1415)  Resp: 19 (10/02/23 1415)  BP: (!) 189/85 (10/02/23 1414)  SpO2: 100 % (10/02/23 1415) Vital Signs (24h Range):  Temp:  [98.4 °F (36.9 °C)] 98.4 °F (36.9 °C)  Pulse:  [41-60] 42  Resp:  [17-23] 19  SpO2:  [99 %-100 %] 100 %  BP: (189-193)/(81-93) 189/85        There is no height or weight on file to calculate BMI.    SpO2: 100 %       No intake or output data in the 24 hours ending 10/02/23 1531    Lines/Drains/Airways       Peripherally Inserted Central Catheter " Line  Duration             PICC Double Lumen 10/02/23 0000 left basilic <1 day                     Physical Exam  Vitals reviewed.   Constitutional:       General: He is not in acute distress.     Appearance: Normal appearance. He is obese. He is ill-appearing. He is not toxic-appearing.   HENT:      Head: Normocephalic and atraumatic.      Mouth/Throat:      Mouth: Mucous membranes are moist.   Cardiovascular:      Rate and Rhythm: Bradycardia present. Rhythm irregular.      Heart sounds: No murmur heard.     No friction rub. No gallop.   Pulmonary:      Effort: Pulmonary effort is normal. No respiratory distress.      Breath sounds: Normal breath sounds.   Abdominal:      Palpations: Abdomen is soft.   Musculoskeletal:      Right lower leg: No edema.      Left lower leg: No edema.      Comments: Left foot amputation  R foot dorsal surface wound, dry, white   Skin:     General: Skin is warm.      Comments: Scaly legs, does not appear overloaded   Neurological:      General: No focal deficit present.      Mental Status: He is alert.        Significant Labs: BMP:   Recent Labs   Lab 10/02/23  1213   GLU 43*      K 4.0      CO2 25   BUN 48*   CREATININE 2.6*   CALCIUM 9.0   MG 1.8   , CMP   Recent Labs   Lab 10/02/23  1213      K 4.0      CO2 25   GLU 43*   BUN 48*   CREATININE 2.6*   CALCIUM 9.0   PROT 6.7   ALBUMIN 2.3*   BILITOT 0.2   ALKPHOS 38*   AST 16   ALT 13   ANIONGAP 9   , CBC   Recent Labs   Lab 10/02/23  1213   WBC 7.50   HGB 8.5*   HCT 26.9*      , and All pertinent lab results from the last 24 hours have been reviewed.    Significant Imaging: Echocardiogram: Transthoracic echo (TTE) complete (Cupid Only):   Results for orders placed or performed during the hospital encounter of 03/09/23   Echo   Result Value Ref Range    BSA 2.45 m2    TDI SEPTAL 0.13 m/s    LV LATERAL E/E' RATIO 9.33 m/s    LV SEPTAL E/E' RATIO 8.62 m/s    IVC diameter 2.4 cm    Left Ventricular Outflow  "Tract Mean Velocity 0.67 cm/s    Left Ventricular Outflow Tract Mean Gradient 2.08 mmHg    TDI LATERAL 0.12 m/s    LVIDd 5.29 3.5 - 6.0 cm    IVS 1.44 (A) 0.6 - 1.1 cm    Posterior Wall 1.41 (A) 0.6 - 1.1 cm    LVIDs 3.30 2.1 - 4.0 cm    FS 38 28 - 44 %    LV mass 326.18 g    LA size 3.81 cm    Left Ventricle Relative Wall Thickness 0.53 cm    AV mean gradient 4 mmHg    AV valve area 2.44 cm2    AV Velocity Ratio 0.71     AV index (prosthetic) 0.65     MV mean gradient 2 mmHg    MV valve area p 1/2 method 3.53 cm2    MV valve area by continuity eq 2.45 cm2    E/A ratio 1.23     Mean e' 0.13 m/s    E wave deceleration time 212.48 msec    IVRT 138.92 msec    MV "A" wave duration [id number] msec    Pulm vein S/D ratio 1.76     LVOT diameter 2.18 cm    LVOT area 3.7 cm2    LVOT peak dl 0.97 m/s    LVOT peak VTI 22.00 cm    Ao peak dl 1.36 m/s    Ao VTI 33.6 cm    Mr max dl 2.36 m/s    LVOT stroke volume 82.07 cm3    AV peak gradient 7 mmHg    MV peak gradient 5 mmHg    E/E' ratio 8.96 m/s    MV Peak E Dl 1.12 m/s    TR Max Dl 2.64 m/s    MV VTI 33.5 cm    MV stenosis pressure 1/2 time 62.28 ms    MV Peak A Dl 0.91 m/s    PV Peak S Dl 0.37 m/s    PV Peak D Dl 0.21 m/s    LV Systolic Volume 44.08 mL    LV Systolic Volume Index 18.8 mL/m2    LV Diastolic Volume 134.78 mL    LV Diastolic Volume Index 57.35 mL/m2    LV Mass Index 139 g/m2    RA Major Axis 5.63 cm    Left Atrium Minor Axis 6.53 cm    Left Atrium Major Axis 7.50 cm    Triscuspid Valve Regurgitation Peak Gradient 28 mmHg    LA Volume Index (Mod) 48.1 mL/m2    LA volume (mod) 112.99 cm3    Right Atrial Pressure (from IVC) 8 mmHg    EF 70 %    Sinus 3.50 cm    TV resting pulmonary artery pressure 36 mmHg    Narrative    · The left ventricle is mildly enlarged with concentric hypertrophy and   normal systolic function.  · The estimated ejection fraction is 70%.  · Normal left ventricular diastolic function.  · Normal right ventricular size with " normal right ventricular systolic   function.  · Severe left atrial enlargement.  · Intermediate central venous pressure (8 mmHg).  · The estimated PA systolic pressure is 36 mmHg.  · Small pericardial effusion.  · Valves not well visualized.        Assessment and Plan:     * Complete heart block  Patient is a 73 year old male with PVD c/b chronic bilateral OM with left foot amputation, paroxysmal atrial fibrillation on Eliquis, T2DM c/b neuropathy, HTN, CKD, PVD, VTE of RUE in June 2022, and asymptomatic Mobitz Type 1 AV block.   Admit to CCU  Start antibiotics for concerns of sepsis  Difficult to place device in setting of possible infection, narrow escape with junctional rhythm, discussed with EP as curbside, monitor for now and consider Cordis placement in prep for possible TVP if needed  Start dobutamine 5 mcg/kg/min  Called January (sister) at [phone] 3 times but no response, tried Kandy at [phone] but not response  Emergent Cordis placement +/- TVP in the near future in dobutamine does not amplify the heart rate    Sepsis  History of OM with Klebsiella and Pseudomonas with VRE in the past   Blood and wound cultures  Antibiotics  ID consult    Paroxysmal atrial fibrillation  ECGs with atrial fibrillation with slow VR in the past  On ELiquis for VTE of the RUE as well  CHADS2-VASc 4    Chronic osteomyelitis  Came from NH with IV linezolid, cefepime, and metronidazole  ID consultation for assistance in management    Nonhealing ulcer of right lower leg with fat layer exposed  See PAD    Chronic deep vein thrombosis (DVT) of right upper extremity  Continue Eliquis 5 mg PO BID    Chronic osteomyelitis of left foot  Came from NH with IV linezolid, cefepime, and metronidazole  ID consultation for assistance in management    Peripheral arterial disease  - long standing wounds with skin graft to left TMA in [id number]; LE angio in 2014 with patent left CFA, SFA, PFA with 3V run off on LE angiogram by  Vascular surgery  - CTA LE with run off 6/2022 with moderate to high grade disease of right popliteal with severely diseased AT, PT, peroneal and multiple occlusion of left popliteal with occluded PT and peroneal and short segment occlusion of AT- patient denies any previous intervention  - BLE arterial ultrasound 3/2023 with similar findings suggestive of bilateral popliteal and BTK disease; seen by Vascular surgery at [facility] with recommendation for amputation- patient declined  - transferred to [facility] 05/2023 for evaluation for revascularization; extensive disease and multiple co morbidities along with debility/bed bound status and concern for non compliance, deemed not a  Revascularization candidate and placed on statin and Plavix; no ASA given risk of bleeding with triple therapy      S/p amputation of L foot    Insulin dependent type 2 diabetes mellitus  Insulin at NH  Continue insulin carefully considering admitting glucose very low    Advanced care planning   patient is a full code for now  Continue palliative discussions    VTE Risk Mitigation (From admission, onward)         Ordered     apixaban tablet 5 mg  2 times daily         10/02/23 1544     IP VTE HIGH RISK PATIENT  Once         10/02/23 1513     Place sequential compression device  Until discontinued         10/02/23 1513            Discussed with staff, Dr Marin.     Adama Lara MD  Cardiology PGY6  [facility]

## 2023-10-02 NOTE — ASSESSMENT & PLAN NOTE
Patient is a 73 year old male with PVD c/b chronic bilateral OM with left foot amputation, paroxysmal atrial fibrillation on Eliquis, T2DM c/b neuropathy, HTN, CKD, PVD, VTE of RUE in June 2022, and asymptomatic Mobitz Type 1 AV block.   Admit to CCU  Start dobutamine 5 mcg/kg/min  Called January (sister) at [phone] 3 times but no response, tried Kandy at [phone] but not response  Emergent Cordis placement +/- TVP in the near future in dobutamine does not amplify the heart rate  Start antibiotics for concerns of sepsis

## 2023-10-02 NOTE — Clinical Note
Diagnosis: Weakness [008650]   Future Attending Provider: DANIELLE CASTILLO [[id number]]   Admitting Provider:: DANIELLE CASTILLO [[id number]]   Special Needs:: Fall Risk [15]

## 2023-10-02 NOTE — HPI
"Patient is a 73 year old male with PVD c/b chronic bilateral OM with left foot amputation, paroxysmal atrial fibrillation on Eliquis, T2DM c/b neuropathy, HTN, CKD, PVD, VTE of RUE in June 2022, and asymptomatic Mobitz Type 1 AV block. The patient presented from [facility] due to concerns of weakness and feeling like he was going to die. Patient reports that he does not feel any dizziness or headaches on the time of admission encounter. He states he knows his name and where he is and the current year (and accurately reports "Saji Garry" and "[facility]" and "2023") but had trouble remembering which nursing home he came from (he said he forgot).   CBG on arrival was in the 20s, given dextrose in ED, CBG improved to 96 mg/dL.  YNES (baseline Cr 1.3) to 2.6  Lactic acid elevation 2.29  "

## 2023-10-02 NOTE — PROGRESS NOTES
"Pharmacokinetic Initial Assessment & PLan: IV Vancomycin     Initiate IV Vancomycin 2000 mg x 1   Pt has YNES, Subsequent doses based on random levels  Obtain a Vancomycin random tomorrow at 1100   Desired empiric serum trough concentration is 15 to 20 mcg/mL     Pharmacy will continue to follow and monitor vancomycin. [id number] with any questions regarding this assessment.      Thank you for the consult,   Darwin Benito       Patient brief summary:  Saji Castañeda is a 73 y.o. male initiated on antimicrobial therapy with IV Vancomycin for treatment of suspected bone/joint infection    Drug Allergies:   Review of patient's allergies indicates:  No Known Allergies    Actual Body Weight:   117.9 kg    Renal Function:   Estimated Creatinine Clearance: 31.6 mL/min (A) (based on SCr of 2.6 mg/dL (H)).,     Dialysis Method (if applicable):  N/A    CBC (last 72 hours):  Recent Labs   Lab Result Units 10/02/23  1213   WBC K/uL 7.50   Hemoglobin g/dL 8.5*   Hematocrit % 26.9*   Platelets K/uL 184   Gran % % 74.0*   Lymph % % 13.6*   Mono % % 8.7   Eosinophil % % 3.1   Basophil % % 0.5   Differential Method  Automated       Metabolic Panel (last 72 hours):  Recent Labs   Lab Result Units 10/02/23  1213   Sodium mmol/L 139   Potassium mmol/L 4.0   Chloride mmol/L 105   CO2 mmol/L 25   Glucose mg/dL 43*   BUN mg/dL 48*   Creatinine mg/dL 2.6*   Albumin g/dL 2.3*   Total Bilirubin mg/dL 0.2   Alkaline Phosphatase U/L 38*   AST U/L 16   ALT U/L 13   Magnesium mg/dL 1.8   Phosphorus mg/dL 3.3       Drug levels (last 3 results):  No results for input(s): "VANCOMYCINRA", "VANCORANDOM", "VANCOMYCINPE", "VANCOPEAK", "VANCOMYCINTR", "VANCOTROUGH" in the last 72 hours.    Microbiologic Results:  Microbiology Results (last 7 days)       Procedure Component Value Units Date/Time    Aerobic culture [[id number]]     Order Status: No result Specimen: Skin from Ankle, Right     Blood culture [[id number]]     Order Status: Sent Specimen: Blood     " Blood culture [[id number]]     Order Status: Sent Specimen: Blood

## 2023-10-02 NOTE — PHARMACY MED REC
"Admission Medication History     The home medication history was taken by Pooja Rey.    You may go to "Admission" then "Reconcile Home Medications" tabs to review and/or act upon these items.     The home medication list has been updated by the Pharmacy department.   Please read ALL comments highlighted in yellow.   Please address this information as you see fit.    Feel free to contact us if you have any questions or require assistance.      The medications listed below were removed from the home medication list. Please reorder if appropriate:  Patient reports no longer taking the following medication(s):  HYDRALAZINE 25 MG TABLET  HYDROCHLOROTHIAZIDE 25 MG TABLET  NIFEDIPINE CC 60 MG TABLET      Current Outpatient Medications on File Prior to Encounter   Medication Sig    acetaminophen (TYLENOL) 325 MG tablet   Take 650 mg by mouth every 6 (six) hours as needed for Temperature greater than.    acidophilus-pectin, citrus 100 million cell-10 mg Cap   Take 1 capsule by mouth 2 (two) times a day.    apixaban (ELIQUIS) 5 mg Tab     Take 1 tablet (5 mg total) by mouth 2 (two) times daily.    ascorbic acid, vitamin C, (VITAMIN C) 500 MG tablet   Take 500 mg by mouth 2 (two) times daily.    B COMPLEX-VITAMIN B12 tablet   Take 1 tablet by mouth once daily.    cefepime HCl (CEFEPIME 2 G/50 ML D5W, READY TO MIX,)   Inject 2 g into the vein every 12 (twelve) hours as needed (for infection of the skin).    clopidogreL (PLAVIX) 75 mg tablet   Take 1 tablet (75 mg total) by mouth once daily.    diphenhydrAMINE (BENADRYL) 25 mg capsule   No directions listed on the patients medication list.    furosemide (LASIX) 40 MG tablet   Take 40 mg by mouth once daily.    gabapentin (NEURONTIN) 100 MG capsule   Take 300 mg by mouth once daily.    glipiZIDE (GLUCOTROL) 10 MG tablet   Take 20 mg by mouth 2 (two) times a day.    insulin aspart U-100 (NOVOLOG) 100 unit/mL (3 mL) InPn pen   Inject 3 Units into the skin 3 (three) times daily " "with meals.    isosorbide dinitrate (ISORDIL) 10 MG tablet   Take 1 tablet (10 mg total) by mouth 3 (three) times daily.    LANTUS SOLOSTAR U-100 INSULIN glargine 100 units/mL SubQ pen   Inject 45 Units into the skin once daily.    linezolid in dextrose 5% (LINEZOLID/PLACEBO) 600 mg/300 mL SolP IVPB   Inject 600 mg into the vein once daily for skin infection    losartan-hydrochlorothiazide 100-25 mg (HYZAAR) 100-25 mg per tablet   Take 1 tablet by mouth once daily.    metFORMIN (GLUMETZA) 1000 MG (MOD) 24hr tablet     Take 1,000 mg by mouth daily.    metroNIDAZOLE (FLAGYL) 500 MG tablet   Take 500 mg by mouth every 8 (eight) hours.    multivitamin (THERAGRAN) per tablet   Take 1 tablet by mouth once daily.    oxyCODONE 5 mg TbOr     Take 1 tablet by mouth every 6 (six) hours as needed (Pain (Max 5 daily)).    pantoprazole (PROTONIX) 40 MG tablet   Take 40 mg by mouth once daily before breakfast    rosuvastatin (CRESTOR) 40 MG Tab   Take 40 mg by mouth once daily.    sodium hypochlorite 0.5 % (DAKIN'S SOLUTION) external solution Apply topically once daily.    tamsulosin (FLOMAX) 0.4 mg Cap Take 0.4 mg by mouth once daily.    triamcinolone acetonide 0.025% (KENALOG) 0.025 % Oint   Apply topically 2 (two) times daily as needed (to affected area).    BD ULTRA-FINE ADITYA PEN NEEDLE 32 gauge x 5/32" Ndle   USE FOUR TIMES DAILY WITH MEALS AND NIGHTLY    blood sugar diagnostic (CONTOUR TEST STRIPS) Strp   Inject 1 strip into the skin 2 (two) times daily before meals.    MICROLET LANCET Misc       pen needle, diabetic 32 gauge x 5/32" Ndle   use as directed with insulin       Pooja Rey  EXT [id number]                  .          "

## 2023-10-02 NOTE — SUBJECTIVE & OBJECTIVE
"Past Medical History:   Diagnosis Date    Arthritis     legs    Diabetes mellitus     Diabetes mellitus, type 2     Hyperlipidemia     Hypertension     Osteomyelitis        Past Surgical History:   Procedure Laterality Date    ANGIOGRAPHY OF LOWER EXTREMITY N/A 2/3/2021    Procedure: Angiogram Extremity Bilateral;  Surgeon: Ernst Chacko MD;  Location: [facility] OR [address] [place] [address];  Service: Peripheral Vascular;  Laterality: N/A;  7.4 mintues fluoroscopy time  816.15 mGy  170.17 Gycm2    AORTOGRAPHY WITH EXTREMITY RUNOFF Bilateral 2/3/2021    Procedure: AORTOGRAM, WITH EXTREMITY RUNOFF;  Surgeon: Ernst Chacko MD;  Location: [facility] OR [address] [place] [address];  Service: Peripheral Vascular;  Laterality: Bilateral;    DEBRIDEMENT OF FOOT Left 2/23/2021    Procedure: DEBRIDEMENT, LEFT HEEL;  Surgeon: Mayra Scrhoeder DPM;  Location: [facility] OR [address] [place] [address];  Service: Podiatry;  Laterality: Left;    FOOT AMPUTATION  October 2010    left high midfoot amputation       Review of patient's allergies indicates:  No Known Allergies    No current facility-administered medications on file prior to encounter.     Current Outpatient Medications on File Prior to Encounter   Medication Sig    apixaban (ELIQUIS) 5 mg Tab Take 1 tablet (5 mg total) by mouth 2 (two) times daily.    ascorbic acid, vitamin C, (VITAMIN C) 500 MG tablet Take 500 mg by mouth once daily.    B COMPLEX-VITAMIN B12 tablet Take 1 tablet by mouth once daily.    BD ULTRA-FINE ADITYA PEN NEEDLE 32 gauge x 5/32" Ndle USE FOUR TIMES DAILY WITH MEALS AND NIGHTLY    blood sugar diagnostic (CONTOUR TEST STRIPS) Strp Inject 1 strip into the skin 2 (two) times daily before meals.    clopidogreL (PLAVIX) 75 mg tablet Take 1 tablet (75 mg total) by mouth once daily.    furosemide (LASIX) 40 MG tablet Take 40 mg by mouth once daily.    gabapentin (NEURONTIN) 100 MG capsule Take 2 capsules (200 mg total) by mouth 2 (two) times daily. (Patient taking differently: Take 300 mg by mouth 2 (two) " "times daily.)    glipiZIDE (GLUCOTROL) 10 MG tablet Take 20 mg by mouth 2 (two) times a day.    hydrALAZINE (APRESOLINE) 25 MG tablet Take 1 tablet (25 mg total) by mouth 3 (three) times daily.    insulin aspart U-100 (NOVOLOG) 100 unit/mL (3 mL) InPn pen Inject 12 Units into the skin 3 (three) times daily with meals.    isosorbide dinitrate (ISORDIL) 10 MG tablet Take 1 tablet (10 mg total) by mouth 3 (three) times daily.    LANTUS SOLOSTAR U-100 INSULIN glargine 100 units/mL SubQ pen Inject 45 Units into the skin every evening.    losartan-hydrochlorothiazide 100-25 mg (HYZAAR) 100-25 mg per tablet Take 1 tablet by mouth once daily.    metFORMIN (GLUCOPHAGE-XR) 500 MG ER 24hr tablet Take 1,000 mg by mouth 2 (two) times a day.    MICROLET LANCET Misc     NIFEdipine (ADALAT CC) 60 MG TbSR Take 60 mg by mouth once daily.    pantoprazole (PROTONIX) 40 MG tablet Take 40 mg by mouth once daily.    pen needle, diabetic 32 gauge x 5/32" Ndle use as directed with insulin    rosuvastatin (CRESTOR) 40 MG Tab Take 40 mg by mouth once daily.    tamsulosin (FLOMAX) 0.4 mg Cap Take 0.4 mg by mouth once daily.    [DISCONTINUED] hydroCHLOROthiazide (HYDRODIURIL) 25 MG tablet Take 0.5 tablets (12.5 mg total) by mouth every Mon, Wed, Fri. Beginning on 7/4/2022     Family History       Problem Relation (Age of Onset)    Cancer Brother    Diabetes Mother, Sister    Heart disease Mother    Stroke Sister          Tobacco Use    Smoking status: Never    Smokeless tobacco: Never   Substance and Sexual Activity    Alcohol use: No     Comment: occassional    Drug use: No    Sexual activity: Not Currently     Review of Systems   Constitutional: Negative for chills and fever.   Cardiovascular:  Negative for chest pain, orthopnea and palpitations.   Respiratory:  Negative for cough and shortness of breath.    Gastrointestinal:  Negative for abdominal pain.   Neurological:  Negative for dizziness, headaches and light-headedness. "   Psychiatric/Behavioral:  Negative for altered mental status.      Objective:     Vital Signs (Most Recent):  Temp: 98.4 °F (36.9 °C) (10/02/23 1113)  Pulse: (!) 42 (10/02/23 1415)  Resp: 19 (10/02/23 1415)  BP: (!) 189/85 (10/02/23 1414)  SpO2: 100 % (10/02/23 1415) Vital Signs (24h Range):  Temp:  [98.4 °F (36.9 °C)] 98.4 °F (36.9 °C)  Pulse:  [41-60] 42  Resp:  [17-23] 19  SpO2:  [99 %-100 %] 100 %  BP: (189-193)/(81-93) 189/85        There is no height or weight on file to calculate BMI.    SpO2: 100 %       No intake or output data in the 24 hours ending 10/02/23 1531    Lines/Drains/Airways       Peripherally Inserted Central Catheter Line  Duration             PICC Double Lumen 10/02/23 0000 left basilic <1 day                     Physical Exam  Vitals reviewed.   Constitutional:       General: He is not in acute distress.     Appearance: Normal appearance. He is obese. He is ill-appearing. He is not toxic-appearing.   HENT:      Head: Normocephalic and atraumatic.      Mouth/Throat:      Mouth: Mucous membranes are moist.   Cardiovascular:      Rate and Rhythm: Bradycardia present. Rhythm irregular.      Heart sounds: No murmur heard.     No friction rub. No gallop.   Pulmonary:      Effort: Pulmonary effort is normal. No respiratory distress.      Breath sounds: Normal breath sounds.   Abdominal:      Palpations: Abdomen is soft.   Musculoskeletal:      Right lower leg: No edema.      Left lower leg: No edema.      Comments: Left foot amputation  R foot dorsal surface wound, dry, white   Skin:     General: Skin is warm.      Comments: Scaly legs, does not appear overloaded   Neurological:      General: No focal deficit present.      Mental Status: He is alert.        Significant Labs: BMP:   Recent Labs   Lab 10/02/23  1213   GLU 43*      K 4.0      CO2 25   BUN 48*   CREATININE 2.6*   CALCIUM 9.0   MG 1.8   , CMP   Recent Labs   Lab 10/02/23  1213      K 4.0      CO2 25   GLU 43*  "  BUN 48*   CREATININE 2.6*   CALCIUM 9.0   PROT 6.7   ALBUMIN 2.3*   BILITOT 0.2   ALKPHOS 38*   AST 16   ALT 13   ANIONGAP 9   , CBC   Recent Labs   Lab 10/02/23  1213   WBC 7.50   HGB 8.5*   HCT 26.9*      , and All pertinent lab results from the last 24 hours have been reviewed.    Significant Imaging: Echocardiogram: Transthoracic echo (TTE) complete (Cupid Only):   Results for orders placed or performed during the hospital encounter of 03/09/23   Echo   Result Value Ref Range    BSA 2.45 m2    TDI SEPTAL 0.13 m/s    LV LATERAL E/E' RATIO 9.33 m/s    LV SEPTAL E/E' RATIO 8.62 m/s    IVC diameter 2.4 cm    Left Ventricular Outflow Tract Mean Velocity 0.67 cm/s    Left Ventricular Outflow Tract Mean Gradient 2.08 mmHg    TDI LATERAL 0.12 m/s    LVIDd 5.29 3.5 - 6.0 cm    IVS 1.44 (A) 0.6 - 1.1 cm    Posterior Wall 1.41 (A) 0.6 - 1.1 cm    LVIDs 3.30 2.1 - 4.0 cm    FS 38 28 - 44 %    LV mass 326.18 g    LA size 3.81 cm    Left Ventricle Relative Wall Thickness 0.53 cm    AV mean gradient 4 mmHg    AV valve area 2.44 cm2    AV Velocity Ratio 0.71     AV index (prosthetic) 0.65     MV mean gradient 2 mmHg    MV valve area p 1/2 method 3.53 cm2    MV valve area by continuity eq 2.45 cm2    E/A ratio 1.23     Mean e' 0.13 m/s    E wave deceleration time 212.48 msec    IVRT 138.92 msec    MV "A" wave duration [id number] msec    Pulm vein S/D ratio 1.76     LVOT diameter 2.18 cm    LVOT area 3.7 cm2    LVOT peak dl 0.97 m/s    LVOT peak VTI 22.00 cm    Ao peak dl 1.36 m/s    Ao VTI 33.6 cm    Mr max dl 2.36 m/s    LVOT stroke volume 82.07 cm3    AV peak gradient 7 mmHg    MV peak gradient 5 mmHg    E/E' ratio 8.96 m/s    MV Peak E Dl 1.12 m/s    TR Max Dl 2.64 m/s    MV VTI 33.5 cm    MV stenosis pressure 1/2 time 62.28 ms    MV Peak A Dl 0.91 m/s    PV Peak S Dl 0.37 m/s    PV Peak D Dl 0.21 m/s    LV Systolic Volume 44.08 mL    LV Systolic Volume Index 18.8 mL/m2    LV Diastolic Volume 134.78 " mL    LV Diastolic Volume Index 57.35 mL/m2    LV Mass Index 139 g/m2    RA Major Axis 5.63 cm    Left Atrium Minor Axis 6.53 cm    Left Atrium Major Axis 7.50 cm    Triscuspid Valve Regurgitation Peak Gradient 28 mmHg    LA Volume Index (Mod) 48.1 mL/m2    LA volume (mod) 112.99 cm3    Right Atrial Pressure (from IVC) 8 mmHg    EF 70 %    Sinus 3.50 cm    TV resting pulmonary artery pressure 36 mmHg    Narrative    · The left ventricle is mildly enlarged with concentric hypertrophy and   normal systolic function.  · The estimated ejection fraction is 70%.  · Normal left ventricular diastolic function.  · Normal right ventricular size with normal right ventricular systolic   function.  · Severe left atrial enlargement.  · Intermediate central venous pressure (8 mmHg).  · The estimated PA systolic pressure is 36 mmHg.  · Small pericardial effusion.  · Valves not well visualized.

## 2023-10-03 PROBLEM — A41.50 GRAM-NEGATIVE SEPSIS: Status: ACTIVE | Noted: 2021-03-23

## 2023-10-03 LAB
ACINETOBACTER CALCOACETICUS/BAUMANNII COMPLEX: DETECTED
ALBUMIN SERPL BCP-MCNC: 2.3 G/DL (ref 3.5–5.2)
ALP SERPL-CCNC: 39 U/L (ref 55–135)
ALT SERPL W/O P-5'-P-CCNC: 12 U/L (ref 10–44)
ANION GAP SERPL CALC-SCNC: 6 MMOL/L (ref 8–16)
ASCENDING AORTA: 3.12 CM
AST SERPL-CCNC: 17 U/L (ref 10–40)
AV INDEX (PROSTH): 0.79
AV MEAN GRADIENT: 6 MMHG
AV PEAK GRADIENT: 9 MMHG
AV VALVE AREA BY VELOCITY RATIO: 3.27 CM²
AV VALVE AREA: 3.05 CM²
AV VELOCITY RATIO: 0.85
BACTERIA UR CULT: NO GROWTH
BACTEROIDES FRAGILIS: NOT DETECTED
BASOPHILS # BLD AUTO: 0.05 K/UL (ref 0–0.2)
BASOPHILS NFR BLD: 0.6 % (ref 0–1.9)
BILIRUB SERPL-MCNC: 0.3 MG/DL (ref 0.1–1)
BSA FOR ECHO PROCEDURE: 2.34 M2
BUN SERPL-MCNC: 43 MG/DL (ref 8–23)
CALCIUM SERPL-MCNC: 9 MG/DL (ref 8.7–10.5)
CANDIDA ALBICANS: NOT DETECTED
CANDIDA AURIS: NOT DETECTED
CANDIDA GLABRATA: NOT DETECTED
CANDIDA KRUSEI: NOT DETECTED
CANDIDA PARAPSILOSIS: NOT DETECTED
CANDIDA TROPICALIS: NOT DETECTED
CHLORIDE SERPL-SCNC: 106 MMOL/L (ref 95–110)
CO2 SERPL-SCNC: 27 MMOL/L (ref 23–29)
CREAT SERPL-MCNC: 2.4 MG/DL (ref 0.5–1.4)
CRP SERPL-MCNC: 11.2 MG/L (ref 0–8.2)
CRYPTOCOCCUS NEOFORMANS/GATTII: NOT DETECTED
CTX-M GENE: NOT DETECTED
CV ECHO LV RWT: 0.54 CM
DIFFERENTIAL METHOD: ABNORMAL
DOP CALC AO PEAK VEL: 1.49 M/S
DOP CALC AO VTI: 31.69 CM
DOP CALC LVOT AREA: 3.9 CM2
DOP CALC LVOT DIAMETER: 2.22 CM
DOP CALC LVOT PEAK VEL: 1.26 M/S
DOP CALC LVOT STROKE VOLUME: 96.57 CM3
DOP CALCLVOT PEAK VEL VTI: 24.96 CM
E WAVE DECELERATION TIME: 187.9 MSEC
E/A RATIO: 1.31
E/E' RATIO: 4.9 M/S
ECHO LV POSTERIOR WALL: 1.24 CM (ref 0.6–1.1)
EJECTION FRACTION: 65 %
ENTEROBACTER CLOACAE COMPLEX: NOT DETECTED
ENTEROBACTERALES: NOT DETECTED
ENTEROCOCCUS FAECALIS: NOT DETECTED
ENTEROCOCCUS FAECIUM: NOT DETECTED
EOSINOPHIL # BLD AUTO: 0.8 K/UL (ref 0–0.5)
EOSINOPHIL NFR BLD: 9.1 % (ref 0–8)
ERYTHROCYTE [DISTWIDTH] IN BLOOD BY AUTOMATED COUNT: 16.5 % (ref 11.5–14.5)
ERYTHROCYTE [SEDIMENTATION RATE] IN BLOOD BY PHOTOMETRIC METHOD: 108 MM/HR (ref 0–23)
ESCHERICHIA COLI: NOT DETECTED
EST. GFR  (NO RACE VARIABLE): 27.6 ML/MIN/1.73 M^2
FRACTIONAL SHORTENING: 38 % (ref 28–44)
GLUCOSE SERPL-MCNC: 39 MG/DL (ref 70–110)
GRAM STN SPEC: NORMAL
GRAM STN SPEC: NORMAL
HAEMOPHILUS INFLUENZAE: NOT DETECTED
HCT VFR BLD AUTO: 28.1 % (ref 40–54)
HGB BLD-MCNC: 8.7 G/DL (ref 14–18)
IMM GRANULOCYTES # BLD AUTO: 0.02 K/UL (ref 0–0.04)
IMM GRANULOCYTES NFR BLD AUTO: 0.2 % (ref 0–0.5)
IMP GENE: NOT DETECTED
INTERVENTRICULAR SEPTUM: 0.73 CM (ref 0.6–1.1)
KLEBSIELLA AEROGENES: NOT DETECTED
KLEBSIELLA OXYTOCA: NOT DETECTED
KLEBSIELLA PNEUMONIAE GROUP: NOT DETECTED
KPC: NOT DETECTED
LA MAJOR: 5.52 CM
LA MINOR: 5.2 CM
LA WIDTH: 3.66 CM
LEFT ATRIUM SIZE: 3.89 CM
LEFT ATRIUM VOLUME INDEX: 28.7 ML/M2
LEFT ATRIUM VOLUME: 64.81 CM3
LEFT INTERNAL DIMENSION IN SYSTOLE: 2.88 CM (ref 2.1–4)
LEFT VENTRICLE DIASTOLIC VOLUME INDEX: 43.19 ML/M2
LEFT VENTRICLE DIASTOLIC VOLUME: 97.6 ML
LEFT VENTRICLE MASS INDEX: 69 G/M2
LEFT VENTRICLE SYSTOLIC VOLUME INDEX: 14 ML/M2
LEFT VENTRICLE SYSTOLIC VOLUME: 31.71 ML
LEFT VENTRICULAR INTERNAL DIMENSION IN DIASTOLE: 4.61 CM (ref 3.5–6)
LEFT VENTRICULAR MASS: 156.12 G
LISTERIA MONOCYTOGENES: NOT DETECTED
LV LATERAL E/E' RATIO: 3.92 M/S
LV SEPTAL E/E' RATIO: 6.53 M/S
LYMPHOCYTES # BLD AUTO: 1 K/UL (ref 1–4.8)
LYMPHOCYTES NFR BLD: 11.3 % (ref 18–48)
MAGNESIUM SERPL-MCNC: 1.8 MG/DL (ref 1.6–2.6)
MCH RBC QN AUTO: 26.9 PG (ref 27–31)
MCHC RBC AUTO-ENTMCNC: 31 G/DL (ref 32–36)
MCR-1: ABNORMAL
MCV RBC AUTO: 87 FL (ref 82–98)
MEC A/C AND MREJ (MRSA): ABNORMAL
MEC A/C: ABNORMAL
MONOCYTES # BLD AUTO: 1 K/UL (ref 0.3–1)
MONOCYTES NFR BLD: 11.3 % (ref 4–15)
MV PEAK A VEL: 0.75 M/S
MV PEAK E VEL: 0.98 M/S
MV STENOSIS PRESSURE HALF TIME: 54.49 MS
MV VALVE AREA P 1/2 METHOD: 4.04 CM2
NDM GENE: NOT DETECTED
NEISSERIA MENINGITIDIS: NOT DETECTED
NEUTROPHILS # BLD AUTO: 5.8 K/UL (ref 1.8–7.7)
NEUTROPHILS NFR BLD: 67.5 % (ref 38–73)
NRBC BLD-RTO: 0 /100 WBC
OXA-48-LIKE: ABNORMAL
PLATELET # BLD AUTO: 174 K/UL (ref 150–450)
PMV BLD AUTO: 9.5 FL (ref 9.2–12.9)
POCT GLUCOSE: 105 MG/DL (ref 70–110)
POCT GLUCOSE: 157 MG/DL (ref 70–110)
POCT GLUCOSE: 241 MG/DL (ref 70–110)
POCT GLUCOSE: 299 MG/DL (ref 70–110)
POCT GLUCOSE: 311 MG/DL (ref 70–110)
POCT GLUCOSE: 71 MG/DL (ref 70–110)
POCT GLUCOSE: 76 MG/DL (ref 70–110)
POTASSIUM SERPL-SCNC: 3.6 MMOL/L (ref 3.5–5.1)
PROCALCITONIN SERPL IA-MCNC: 0.24 NG/ML
PROT SERPL-MCNC: 6.5 G/DL (ref 6–8.4)
PROTEUS SPECIES: NOT DETECTED
PSEUDOMONAS AERUGINOSA: NOT DETECTED
RA MAJOR: 4.42 CM
RA PRESSURE ESTIMATED: 3 MMHG
RA WIDTH: 4.71 CM
RBC # BLD AUTO: 3.23 M/UL (ref 4.6–6.2)
RIGHT VENTRICULAR END-DIASTOLIC DIMENSION: 3.5 CM
SALMONELLA SP: NOT DETECTED
SERRATIA MARCESCENS: NOT DETECTED
SINUS: 3.54 CM
SODIUM SERPL-SCNC: 139 MMOL/L (ref 136–145)
STAPHYLOCOCCUS AUREUS: NOT DETECTED
STAPHYLOCOCCUS EPIDERMIDIS: NOT DETECTED
STAPHYLOCOCCUS LUGDUNESIS: NOT DETECTED
STAPHYLOCOCCUS SPECIES: NOT DETECTED
STENOTROPHOMONAS MALTOPHILIA: NOT DETECTED
STJ: 3.14 CM
STREPTOCOCCUS AGALACTIAE: NOT DETECTED
STREPTOCOCCUS PNEUMONIAE: NOT DETECTED
STREPTOCOCCUS PYOGENES: NOT DETECTED
STREPTOCOCCUS SPECIES: NOT DETECTED
TDI LATERAL: 0.25 M/S
TDI SEPTAL: 0.15 M/S
TDI: 0.2 M/S
VAN A/B: ABNORMAL
VANCOMYCIN SERPL-MCNC: 18.9 UG/ML
VIM GENE: NOT DETECTED
WBC # BLD AUTO: 8.53 K/UL (ref 3.9–12.7)
Z-SCORE OF LEFT VENTRICULAR DIMENSION IN END DIASTOLE: -5.87
Z-SCORE OF LEFT VENTRICULAR DIMENSION IN END SYSTOLE: -4.39

## 2023-10-03 PROCEDURE — 93005 ELECTROCARDIOGRAM TRACING: CPT

## 2023-10-03 PROCEDURE — 25000003 PHARM REV CODE 250

## 2023-10-03 PROCEDURE — 87106 FUNGI IDENTIFICATION YEAST: CPT

## 2023-10-03 PROCEDURE — 93010 EKG 12-LEAD: ICD-10-PCS | Mod: ,,, | Performed by: INTERNAL MEDICINE

## 2023-10-03 PROCEDURE — 63600175 PHARM REV CODE 636 W HCPCS: Performed by: STUDENT IN AN ORGANIZED HEALTH CARE EDUCATION/TRAINING PROGRAM

## 2023-10-03 PROCEDURE — 99291 CRITICAL CARE FIRST HOUR: CPT | Mod: ,,, | Performed by: INTERNAL MEDICINE

## 2023-10-03 PROCEDURE — 87070 CULTURE OTHR SPECIMN AEROBIC: CPT

## 2023-10-03 PROCEDURE — 99233 SBSQ HOSP IP/OBS HIGH 50: CPT | Mod: GC,,, | Performed by: INTERNAL MEDICINE

## 2023-10-03 PROCEDURE — 99291 CRITICAL CARE FIRST HOUR: CPT | Mod: GC,,, | Performed by: INTERNAL MEDICINE

## 2023-10-03 PROCEDURE — 80202 ASSAY OF VANCOMYCIN: CPT

## 2023-10-03 PROCEDURE — 63600175 PHARM REV CODE 636 W HCPCS

## 2023-10-03 PROCEDURE — 99223 1ST HOSP IP/OBS HIGH 75: CPT | Mod: ,,, | Performed by: PODIATRIST

## 2023-10-03 PROCEDURE — 85652 RBC SED RATE AUTOMATED: CPT

## 2023-10-03 PROCEDURE — 25000003 PHARM REV CODE 250: Performed by: STUDENT IN AN ORGANIZED HEALTH CARE EDUCATION/TRAINING PROGRAM

## 2023-10-03 PROCEDURE — 99233 PR SUBSEQUENT HOSPITAL CARE,LEVL III: ICD-10-PCS | Mod: GC,,, | Performed by: INTERNAL MEDICINE

## 2023-10-03 PROCEDURE — 25000003 PHARM REV CODE 250: Performed by: INTERNAL MEDICINE

## 2023-10-03 PROCEDURE — 20000000 HC ICU ROOM

## 2023-10-03 PROCEDURE — 84145 PROCALCITONIN (PCT): CPT

## 2023-10-03 PROCEDURE — 83735 ASSAY OF MAGNESIUM: CPT | Performed by: INTERNAL MEDICINE

## 2023-10-03 PROCEDURE — 25500020 PHARM REV CODE 255: Performed by: INTERNAL MEDICINE

## 2023-10-03 PROCEDURE — 87186 SC STD MICRODIL/AGAR DIL: CPT

## 2023-10-03 PROCEDURE — 87205 SMEAR GRAM STAIN: CPT

## 2023-10-03 PROCEDURE — 87077 CULTURE AEROBIC IDENTIFY: CPT

## 2023-10-03 PROCEDURE — 80053 COMPREHEN METABOLIC PANEL: CPT | Performed by: INTERNAL MEDICINE

## 2023-10-03 PROCEDURE — 63600175 PHARM REV CODE 636 W HCPCS: Performed by: INTERNAL MEDICINE

## 2023-10-03 PROCEDURE — 99291 PR CRITICAL CARE, E/M 30-74 MINUTES: ICD-10-PCS | Mod: GC,,, | Performed by: INTERNAL MEDICINE

## 2023-10-03 PROCEDURE — 25000003 PHARM REV CODE 250: Performed by: EMERGENCY MEDICINE

## 2023-10-03 PROCEDURE — 99291 PR CRITICAL CARE, E/M 30-74 MINUTES: ICD-10-PCS | Mod: ,,, | Performed by: INTERNAL MEDICINE

## 2023-10-03 PROCEDURE — 85025 COMPLETE CBC W/AUTO DIFF WBC: CPT | Performed by: INTERNAL MEDICINE

## 2023-10-03 PROCEDURE — 99223 PR INITIAL HOSPITAL CARE,LEVL III: ICD-10-PCS | Mod: ,,, | Performed by: PODIATRIST

## 2023-10-03 PROCEDURE — 93010 ELECTROCARDIOGRAM REPORT: CPT | Mod: ,,, | Performed by: INTERNAL MEDICINE

## 2023-10-03 PROCEDURE — 94761 N-INVAS EAR/PLS OXIMETRY MLT: CPT

## 2023-10-03 PROCEDURE — 87075 CULTR BACTERIA EXCEPT BLOOD: CPT

## 2023-10-03 PROCEDURE — 86140 C-REACTIVE PROTEIN: CPT

## 2023-10-03 RX ORDER — GLUCAGON 1 MG
1 KIT INJECTION
Status: DISCONTINUED | OUTPATIENT
Start: 2023-10-03 | End: 2023-10-19 | Stop reason: HOSPADM

## 2023-10-03 RX ORDER — INSULIN ASPART 100 [IU]/ML
0-5 INJECTION, SOLUTION INTRAVENOUS; SUBCUTANEOUS
Status: DISCONTINUED | OUTPATIENT
Start: 2023-10-03 | End: 2023-10-19 | Stop reason: HOSPADM

## 2023-10-03 RX ORDER — HYDRALAZINE HYDROCHLORIDE 20 MG/ML
10 INJECTION INTRAMUSCULAR; INTRAVENOUS ONCE
Status: COMPLETED | OUTPATIENT
Start: 2023-10-03 | End: 2023-10-03

## 2023-10-03 RX ORDER — LORAZEPAM 2 MG/ML
1 INJECTION INTRAMUSCULAR ONCE
Status: COMPLETED | OUTPATIENT
Start: 2023-10-03 | End: 2023-10-03

## 2023-10-03 RX ORDER — HYDRALAZINE HYDROCHLORIDE 20 MG/ML
10 INJECTION INTRAMUSCULAR; INTRAVENOUS EVERY 6 HOURS PRN
Status: DISCONTINUED | OUTPATIENT
Start: 2023-10-03 | End: 2023-10-03

## 2023-10-03 RX ORDER — IBUPROFEN 200 MG
24 TABLET ORAL
Status: DISCONTINUED | OUTPATIENT
Start: 2023-10-03 | End: 2023-10-19 | Stop reason: HOSPADM

## 2023-10-03 RX ORDER — VANCOMYCIN HCL IN 5 % DEXTROSE 1.25 G/25
1250 PLASTIC BAG, INJECTION (ML) INTRAVENOUS ONCE
Status: DISCONTINUED | OUTPATIENT
Start: 2023-10-03 | End: 2023-10-03

## 2023-10-03 RX ORDER — DEXTROSE MONOHYDRATE 50 MG/ML
INJECTION, SOLUTION INTRAVENOUS CONTINUOUS
Status: DISCONTINUED | OUTPATIENT
Start: 2023-10-03 | End: 2023-10-03

## 2023-10-03 RX ORDER — IBUPROFEN 200 MG
16 TABLET ORAL
Status: DISCONTINUED | OUTPATIENT
Start: 2023-10-03 | End: 2023-10-19 | Stop reason: HOSPADM

## 2023-10-03 RX ADMIN — HYDRALAZINE HYDROCHLORIDE 50 MG: 50 TABLET ORAL at 09:10

## 2023-10-03 RX ADMIN — HYDRALAZINE HYDROCHLORIDE 50 MG: 50 TABLET ORAL at 05:10

## 2023-10-03 RX ADMIN — INSULIN ASPART 1 UNITS: 100 INJECTION, SOLUTION INTRAVENOUS; SUBCUTANEOUS at 09:10

## 2023-10-03 RX ADMIN — HYDRALAZINE HYDROCHLORIDE 50 MG: 50 TABLET ORAL at 01:10

## 2023-10-03 RX ADMIN — HYDRALAZINE HYDROCHLORIDE 10 MG: 20 INJECTION, SOLUTION INTRAMUSCULAR; INTRAVENOUS at 10:10

## 2023-10-03 RX ADMIN — HUMAN ALBUMIN MICROSPHERES AND PERFLUTREN 0.66 MG: 10; .22 INJECTION, SOLUTION INTRAVENOUS at 12:10

## 2023-10-03 RX ADMIN — APIXABAN 5 MG: 5 TABLET, FILM COATED ORAL at 09:10

## 2023-10-03 RX ADMIN — CLOPIDOGREL BISULFATE 75 MG: 75 TABLET ORAL at 09:10

## 2023-10-03 RX ADMIN — VANCOMYCIN HYDROCHLORIDE 1250 MG: 1.25 INJECTION, POWDER, LYOPHILIZED, FOR SOLUTION INTRAVENOUS at 04:10

## 2023-10-03 RX ADMIN — PIPERACILLIN AND TAZOBACTAM 4.5 G: 4; .5 INJECTION, POWDER, LYOPHILIZED, FOR SOLUTION INTRAVENOUS; PARENTERAL at 05:10

## 2023-10-03 RX ADMIN — PIPERACILLIN AND TAZOBACTAM 4.5 G: 4; .5 INJECTION, POWDER, LYOPHILIZED, FOR SOLUTION INTRAVENOUS; PARENTERAL at 08:10

## 2023-10-03 RX ADMIN — DEXTROSE MONOHYDRATE: 100 INJECTION, SOLUTION INTRAVENOUS at 08:10

## 2023-10-03 RX ADMIN — PIPERACILLIN AND TAZOBACTAM 4.5 G: 4; .5 INJECTION, POWDER, LYOPHILIZED, FOR SOLUTION INTRAVENOUS; PARENTERAL at 12:10

## 2023-10-03 RX ADMIN — ATORVASTATIN CALCIUM 80 MG: 40 TABLET, FILM COATED ORAL at 09:10

## 2023-10-03 RX ADMIN — LORAZEPAM 1 MG: 2 INJECTION INTRAMUSCULAR at 03:10

## 2023-10-03 NOTE — PROGRESS NOTES
[facility] - [facility]ensive [facility]  Cardiology  Progress Note    Patient Name: Saji Castañeda  MRN: [id number]  Admission Date: 10/2/2023  Hospital Length of Stay: 1 days  Code Status: Full Code   Attending Physician: Blanca Marin MD   Primary Care Physician: [facility] -  Expected Discharge Date: 10/10/2023  Principal Problem:Complete heart block    Subjective:     Hospital Course:   Pt with osteomyelitis admitted for encephalopathy and weakness concerning for septic shock. EKG with bradycardia and complete heart block so admitted to CCU. Started on dobutamine for inotropic support in setting of hypertension. Pt started on Vanc and Zosyn. BCx +GNR bacteremia with PCR positive for Acinetobacter. Due to complaints of aphasia, CT head ordered which did not show acute stroke. MRI ordered to clinically follow-up. ID consulted as pt has been on Linezolid for osteomyelitis. PICC line to be removed and replaced. Podiatry following who recommended extremities XR, wound cultured.       No new subjective & objective note has been filed under this hospital service since the last note was generated.    Assessment and Plan:       * Complete heart block  Patient is a 73 year old male with PVD c/b chronic bilateral OM with left foot amputation, paroxysmal atrial fibrillation on Eliquis, T2DM c/b neuropathy, HTN, CKD, PVD, VTE of RUE in June 2022, and asymptomatic Mobitz Type 1 AV block.       Plan:  -continue dobutamine ftt  -EP consulted, plans for leadless PPM when infection resolved  -telemetry and electrolyte replacements  -treating severe sepsis     Sepsis  History of OM with Klebsiella and Pseudomonas with VRE in the past   Blood and wound cultures  Antibiotics  ID consult    Paroxysmal atrial fibrillation  ECGs with atrial fibrillation with slow VR in the past  On ELiquis for VTE of the RUE as well  CHADS2-VASc 4    Chronic osteomyelitis  Came from NH with IV linezolid, cefepime, and metronidazole  ID  consultation for assistance in management    Nonhealing ulcer of right lower leg with fat layer exposed  See PAD    Chronic deep vein thrombosis (DVT) of right upper extremity  Continue Eliquis 5 mg PO BID    Gram-negative sepsis  Chronic OM of L foot    Came from NH with IV linezolid, cefepime, and metronidazole for chronic osteo.    -podiatry following, appreciate recs  -ID consultation for assistance in management  -PICC line removal + replacement  -continue Vanc+Zosyn  -f/u wound culture, bcx    Peripheral arterial disease  - long standing wounds with skin graft to left TMA in [id number]; LE angio in 2014 with patent left CFA, SFA, PFA with 3V run off on LE angiogram by Vascular surgery  - CTA LE with run off 6/2022 with moderate to high grade disease of right popliteal with severely diseased AT, PT, peroneal and multiple occlusion of left popliteal with occluded PT and peroneal and short segment occlusion of AT- patient denies any previous intervention  - BLE arterial ultrasound 3/2023 with similar findings suggestive of bilateral popliteal and BTK disease; seen by Vascular surgery at [facility] with recommendation for amputation- patient declined  - transferred to [facility] 05/2023 for evaluation for revascularization; extensive disease and multiple co morbidities along with debility/bed bound status and concern for non compliance, deemed not a  Revascularization candidate and placed on statin and Plavix; no ASA given risk of bleeding with triple therapy    PLAN:  -continue plavix, statin  -podiatry and vascular surgery following   -pending ABIs      Insulin dependent type 2 diabetes mellitus  Insulin at NH, persistent hypoglycemia now rebounded back to hyperglycemia    -LDSSI due to renal dysfunction  -diabetic diet, dysphagia        VTE Risk Mitigation (From admission, onward)         Ordered     apixaban tablet 5 mg  2 times daily         10/02/23 1544     IP VTE HIGH RISK PATIENT  Once         10/02/23 1513      Place sequential compression device  Until discontinued         10/02/23 1513                Meli Escobedo MD  Cardiology  [facility] - [facility]

## 2023-10-03 NOTE — SUBJECTIVE & OBJECTIVE
Interval History: HR stable at 70s. Pt still with complaints of difficulty with word finding so MRI ordered. Podiatry following for wounds, pending MARIO and XR.     Review of Systems   Constitutional: Negative for chills and fever.   Eyes:  Negative for blurred vision and double vision.   Cardiovascular:  Negative for chest pain and syncope.   Musculoskeletal:  Positive for myalgias (bilateral lower extremity).   Gastrointestinal:  Negative for abdominal pain, melena and vomiting.   Genitourinary:  Negative for dysuria and hematuria.   Neurological:  Negative for dizziness.        (+) speech change   Psychiatric/Behavioral:  Negative for depression and hallucinations.      Objective:     Vital Signs (Most Recent):  Temp: 98.1 °F (36.7 °C) (10/03/23 1505)  Pulse: 70 (10/03/23 1600)  Resp: (!) 22 (10/03/23 1600)  BP: (!) 159/69 (10/03/23 1600)  SpO2: 95 % (10/03/23 1600) Vital Signs (24h Range):  Temp:  [97.6 °F (36.4 °C)-98.5 °F (36.9 °C)] 98.1 °F (36.7 °C)  Pulse:  [] 70  Resp:  [17-24] 22  SpO2:  [94 %-100 %] 95 %  BP: (136-218)/() 159/69     Weight: 112.5 kg (248 lb)  Body mass index is 36.62 kg/m².     SpO2: 95 %         Intake/Output Summary (Last 24 hours) at 10/3/2023 1655  Last data filed at 10/3/2023 1602  Gross per 24 hour   Intake 2391.93 ml   Output --   Net 2391.93 ml       Lines/Drains/Airways       Peripherally Inserted Central Catheter Line  Duration             PICC Double Lumen 10/02/23 0000 left basilic 1 day              Central Venous Catheter Line  Duration                  Percutaneous Central Line Insertion/Assessment - Cordis 10/02/23 1650 Internal Jugular Right 1 day              Drain  Duration             Male External Urinary Catheter 10/03/23 0844 Small <1 day                       Physical Exam  Constitutional:       Appearance: He is obese. He is ill-appearing (chronically).   HENT:      Head: Normocephalic and atraumatic.      Nose: Nose normal. No congestion or rhinorrhea.       Mouth/Throat:      Mouth: Mucous membranes are moist.   Eyes:      General: No scleral icterus.        Right eye: No discharge.         Left eye: No discharge.      Extraocular Movements: Extraocular movements intact.   Cardiovascular:      Rate and Rhythm: Normal rate.      Heart sounds:      No friction rub.      Comments: 3rd degree heart block.   Pulmonary:      Effort: Pulmonary effort is normal. No respiratory distress.      Breath sounds: No stridor. No wheezing.   Abdominal:      General: Abdomen is flat. There is no distension.      Palpations: Abdomen is soft.   Musculoskeletal:      Right lower leg: Edema present.      Left lower leg: Edema present.   Skin:     General: Skin is warm and dry.      Comments: Bilateral venous stasis changes, flaky skin to bilateral lower extremities. S/p partial foot L appears infected.    Neurological:      Mental Status: He is alert and oriented to person, place, and time.      Gait: Gait abnormal.      Comments: Mild expressive aphasia.    Psychiatric:         Mood and Affect: Mood normal.         Behavior: Behavior normal.            Significant Labs: Blood Culture:   Recent Labs   Lab 10/02/23  1701   LABBLOO Gram stain aer bottle: Gram negative rods  Gram stain jumana bottle: Gram negative rods  Results called to and read back by:Alejandra Figueroa RN 10/03/2023  06:29  Gram stain aer bottle: Gram negative rods  Gram stain jumana bottle: Gram negative rods  Results called to and read back by:Alejandra Figueroa RN 10/03/2023  06:29   , BMP:   Recent Labs   Lab 10/02/23  1213 10/03/23  0439   GLU 43* 39*    139   K 4.0 3.6    106   CO2 25 27   BUN 48* 43*   CREATININE 2.6* 2.4*   CALCIUM 9.0 9.0   MG 1.8 1.8   , CBC   Recent Labs   Lab 10/02/23  1213 10/03/23  0439   WBC 7.50 8.53   HGB 8.5* 8.7*   HCT 26.9* 28.1*    174   , and All pertinent lab results from the last 24 hours have been reviewed.    Significant Imaging: All pertinent imaging results  from the last 24 hours have been reviewed.

## 2023-10-03 NOTE — ASSESSMENT & PLAN NOTE
Patient is a 73 year old male with PVD c/b chronic bilateral OM with left foot amputation, paroxysmal atrial fibrillation on Eliquis, T2DM c/b neuropathy, HTN, CKD, PVD, VTE of RUE in June 2022, and asymptomatic Mobitz Type 1 AV block.       Plan:  -continue dobutamine ftt  -EP consulted, plans for leadless PPM when infection resolved  -telemetry and electrolyte replacements  -treating severe sepsis

## 2023-10-03 NOTE — PLAN OF CARE
SW went to bedside to attempt to complete initial discharge assessment but pt was FRANKIE with no one at bedside.  SW to attempt again later.      Anuja Ro LMSW  [facility]  [id number]

## 2023-10-03 NOTE — HPI
73-year-old male with history of afib, T2DM, PVD s/p left foot amputation, chronic osteomyelitis, presented from nursing home with weakness. Patient found to have mobitz type 1 AV block, admitted to CCU. Patient started on dobutamine IV. ID consulted for management of osteomyelitis of left foot. He was being treated with linezolid, cefepime, metronidazole at his nursing home. He had a left PICC line. Patient found to have Acinetobacter baumanii bacteremia. Patient reported feeling better compared to admission, denied having any pain.

## 2023-10-03 NOTE — CONSULTS
[facility] - Cardiac Intensive Care  Vascular Surgery  Consult Note    Inpatient consult to Vascular Surgery  Consult performed by: Aliya Linder MD  Consult ordered by: Tank Bhardwaj MD        Subjective:     Chief Complaint/Reason for Admission: heart block     History of Present Illness: Saji Castañeda is a 73 y.o. male with a history of PVD c/b chronic bilateral OM with left foot s/p chopart amputation, pAfib on Eliquis, DM2 c/b neuropathy, HTN, CKD, VTE of RUE in June 2022, and asymptomatic Mobitz Type 1 AV block. Patient originally presented from [facility] with complaints of weakness and feelings of impending doom. Was found to have complete heart block. Currently admitted to the CICU for management. Patient has a long standing history of bilateral lower extremity swelling and chronic ulcerations of bilateral lower extremities. He has been having wound care performed at his nursing home, but he cannot recall where this is. He was most recently hospitalized in May of this year with worsening of his BLE wounds at which time he was offered, and refused, bilateral AKAs. During this admission, found to have Acinetobacter bacteremia likely secondary to infected PICC line. Vascular surgery consulted to evaluate. On exam, patient with granulation tissue of wounds of BLE. Bilateral femoral pulses 2+. L AT and PT triphasic. R AT triphasic. Patient appears to be very slowly healing his wounds.           Medications Prior to Admission   Medication Sig Dispense Refill Last Dose    acetaminophen (TYLENOL) 325 MG tablet Take 650 mg by mouth every 6 (six) hours as needed for Temperature greater than.       acidophilus-pectin, citrus 100 million cell-10 mg Cap Take 1 capsule by mouth 2 (two) times a day.       apixaban (ELIQUIS) 5 mg Tab Take 1 tablet (5 mg total) by mouth 2 (two) times daily. 60 tablet 0     ascorbic acid, vitamin C, (VITAMIN C) 500 MG tablet Take 500 mg by mouth 2 (two) times daily.       B  COMPLEX-VITAMIN B12 tablet Take 1 tablet by mouth once daily.       cefepime HCl (CEFEPIME 2 G/50 ML D5W, READY TO MIX,) Inject 2 g into the vein every 12 (twelve) hours as needed (for infection of the skin).       clopidogreL (PLAVIX) 75 mg tablet Take 1 tablet (75 mg total) by mouth once daily. 60 tablet 0     diphenhydrAMINE (BENADRYL) 25 mg capsule No directions listed on the patients medication list.       furosemide (LASIX) 40 MG tablet Take 40 mg by mouth once daily.       gabapentin (NEURONTIN) 100 MG capsule Take 2 capsules (200 mg total) by mouth 2 (two) times daily. (Patient taking differently: Take 300 mg by mouth once daily.) 120 capsule 1     glipiZIDE (GLUCOTROL) 10 MG tablet Take 20 mg by mouth 2 (two) times a day.       insulin aspart U-100 (NOVOLOG) 100 unit/mL (3 mL) InPn pen Inject 12 Units into the skin 3 (three) times daily with meals. (Patient taking differently: Inject 3 Units into the skin 3 (three) times daily with meals.) 30 mL 3     isosorbide dinitrate (ISORDIL) 10 MG tablet Take 1 tablet (10 mg total) by mouth 3 (three) times daily. 90 tablet 11     LANTUS SOLOSTAR U-100 INSULIN glargine 100 units/mL SubQ pen Inject 45 Units into the skin every evening. (Patient taking differently: Inject 45 Units into the skin once daily.)  0     linezolid in dextrose 5% (LINEZOLID/PLACEBO) 600 mg/300 mL SolP IVPB Inject 600 mg into the vein once daily. For skin infection       losartan-hydrochlorothiazide 100-25 mg (HYZAAR) 100-25 mg per tablet Take 1 tablet by mouth once daily.       metFORMIN (GLUMETZA) 1000 MG (MOD) 24hr tablet Take 1,000 mg by mouth Daily.       metroNIDAZOLE (FLAGYL) 500 MG tablet Take 500 mg by mouth every 8 (eight) hours.       multivitamin (THERAGRAN) per tablet Take 1 tablet by mouth once daily.       oxyCODONE 5 mg TbOr Take 1 tablet by mouth every 6 (six) hours as needed (Pain (Max 5 daily)).       pantoprazole (PROTONIX) 40 MG tablet Take 40 mg by mouth once daily. Before  "breakfast       rosuvastatin (CRESTOR) 40 MG Tab Take 40 mg by mouth once daily.       sodium hypochlorite 0.5 % (DAKIN'S SOLUTION) external solution Apply topically once daily.       tamsulosin (FLOMAX) 0.4 mg Cap Take 0.4 mg by mouth once daily.       triamcinolone acetonide 0.025% (KENALOG) 0.025 % Oint Apply topically 2 (two) times daily as needed (to affected area).       BD ULTRA-FINE ADITYA PEN NEEDLE 32 gauge x 5/32" Ndle USE FOUR TIMES DAILY WITH MEALS AND NIGHTLY 100 each 0     blood sugar diagnostic (CONTOUR TEST STRIPS) Strp Inject 1 strip into the skin 2 (two) times daily before meals. 100 strip 6     MICROLET LANCET Misc   0     pen needle, diabetic 32 gauge x 5/32" Ndle use as directed with insulin 100 each 2        Review of patient's allergies indicates:  No Known Allergies    Past Medical History:   Diagnosis Date    Arthritis     legs    Diabetes mellitus     Diabetes mellitus, type 2     Hyperlipidemia     Hypertension     Osteomyelitis      Past Surgical History:   Procedure Laterality Date    ANGIOGRAPHY OF LOWER EXTREMITY N/A 2/3/2021    Procedure: Angiogram Extremity Bilateral;  Surgeon: Ernst Chacko MD;  Location: [facility] OR [address] [place] [address];  Service: Peripheral Vascular;  Laterality: N/A;  7.4 mintues fluoroscopy time  816.15 mGy  170.17 Gycm2    AORTOGRAPHY WITH EXTREMITY RUNOFF Bilateral 2/3/2021    Procedure: AORTOGRAM, WITH EXTREMITY RUNOFF;  Surgeon: Ernst Chacko MD;  Location: [facility] OR [address] [place] [address];  Service: Peripheral Vascular;  Laterality: Bilateral;    DEBRIDEMENT OF FOOT Left 2/23/2021    Procedure: DEBRIDEMENT, LEFT HEEL;  Surgeon: Mayra Schroeder DPM;  Location: [facility] OR [address] [place] [address];  Service: Podiatry;  Laterality: Left;    FOOT AMPUTATION  October 2010    left high midfoot amputation     Family History       Problem Relation (Age of Onset)    Cancer Brother    Diabetes Mother, Sister    Heart disease Mother    Stroke Sister          Tobacco Use    Smoking status: Never    " Smokeless tobacco: Never   Substance and Sexual Activity    Alcohol use: No     Comment: occassional    Drug use: No    Sexual activity: Not Currently     Review of Systems   Constitutional:  Negative for chills, diaphoresis and fever.   HENT:  Negative for rhinorrhea and sore throat.    Respiratory:  Negative for cough and shortness of breath.    Cardiovascular:  Negative for chest pain and leg swelling.   Gastrointestinal:  Negative for abdominal pain, diarrhea, nausea and vomiting.   Genitourinary:  Negative for dysuria and hematuria.   Musculoskeletal:  Negative for arthralgias and myalgias.   Skin:  Negative for rash.   Neurological:  Negative for headaches.     Objective:     Vital Signs (Most Recent):  Temp: 98.1 °F (36.7 °C) (10/03/23 1505)  Pulse: 82 (10/03/23 1702)  Resp: (!) 23 (10/03/23 1702)  BP: (!) 176/67 (10/03/23 1702)  SpO2: 95 % (10/03/23 1702) Vital Signs (24h Range):  Temp:  [97.6 °F (36.4 °C)-98.5 °F (36.9 °C)] 98.1 °F (36.7 °C)  Pulse:  [] 82  Resp:  [17-24] 23  SpO2:  [94 %-100 %] 95 %  BP: (136-218)/() 176/67     Weight: 112.5 kg (248 lb)  Body mass index is 36.62 kg/m².      Physical Exam  Vitals reviewed.   Constitutional:       General: He is not in acute distress.     Appearance: He is not toxic-appearing.   HENT:      Head: Normocephalic and atraumatic.      Nose: Nose normal.   Cardiovascular:      Rate and Rhythm: Normal rate.      Comments: Bilateral femoral pulses 2+   L AT and PT triphasic  R AT triphasic  Pulmonary:      Effort: Pulmonary effort is normal. No respiratory distress.   Abdominal:      General: Abdomen is flat.      Palpations: Abdomen is soft.   Musculoskeletal:      Comments: Bilateral lower extremities edematous with extensive flaking and scaling   Bilateral lower extremity wounds. L heel wound with granulation tissue does not probe to bone. R anterior ankle wound with granulation tissue does not probe to bone.    Neurological:      General: No focal  deficit present.      Mental Status: He is alert.   Psychiatric:         Mood and Affect: Mood normal.          Significant Labs:  CBC:   Recent Labs   Lab 10/03/23  0439   WBC 8.53   RBC 3.23*   HGB 8.7*   HCT 28.1*      MCV 87   MCH 26.9*   MCHC 31.0*     CMP:   Recent Labs   Lab 10/03/23  0439   GLU 39*   CALCIUM 9.0   ALBUMIN 2.3*   PROT 6.5      K 3.6   CO2 27      BUN 43*   CREATININE 2.4*   ALKPHOS 39*   ALT 12   AST 17   BILITOT 0.3     All pertinent labs from the last 24 hours have been reviewed.    Significant Diagnostics:  I have reviewed all pertinent imaging results/findings within the past 24 hours.    Assessment/Plan:     Nonhealing ulcer of right lower leg with fat layer exposed  73M w/ history of PVD c/b chronic bilateral OM with left foot s/p chopart amputation, pAfib on Eliquis, DM2 c/b neuropathy, HTN, CKD, VTE of RUE in June 2022, and asymptomatic Mobitz Type 1 AV block who is admitted for new found complete heart block and acinetobacter bacteremia likely source being PICC line. Vascular surgery consulted to evaluate. Patient with flow to bilateral feet. Wounds appear to be very slowly healing (since his last admission in May). It appears that he may be able to heal his wounds. He would benefit from continued wound care during this admission as well as elevation and compression.     -- no acute surgical intervention indicated   -- podiatry on board, appreciate assistance  -- wounds appear to be slowly healing, granulation tissue in wound bases  -- recommend wound care consult  -- compression and elevation   -- will follow up MARIO/pvrs   -- remainder of care per primary team                 Aliya Linder MD  Vascular Surgery  [facility] - [facility]    VASCULAR SURGERY ATTENDING ATTESTATION    I have seen and examined the patient and I have taken the history and reviewed the chart/studies and personally reviewed and interpreted the relevant imaging. Wounds are healing  with good granulation tissue. Suspect delayed wound healing is due to severe edema. No evidence of arterial disease based on physical exam. Will obtain MARIO/PVR to confirm. Would NOT recommend any amputation based on physical exam. Recommend elevation and compression to reduce his edema.     HERIBERTO Back II, MD, [facility]  Vascular Surgery  [facility]

## 2023-10-03 NOTE — SUBJECTIVE & OBJECTIVE
Past Medical History:   Diagnosis Date    Arthritis     legs    Diabetes mellitus     Diabetes mellitus, type 2     Hyperlipidemia     Hypertension     Osteomyelitis        Past Surgical History:   Procedure Laterality Date    ANGIOGRAPHY OF LOWER EXTREMITY N/A 2/3/2021    Procedure: Angiogram Extremity Bilateral;  Surgeon: Ernst Chacko MD;  Location: [facility] OR [address] [place] [address];  Service: Peripheral Vascular;  Laterality: N/A;  7.4 mintues fluoroscopy time  816.15 mGy  170.17 Gycm2    AORTOGRAPHY WITH EXTREMITY RUNOFF Bilateral 2/3/2021    Procedure: AORTOGRAM, WITH EXTREMITY RUNOFF;  Surgeon: Ernst Chacko MD;  Location: [facility] OR [address] [place] [address];  Service: Peripheral Vascular;  Laterality: Bilateral;    DEBRIDEMENT OF FOOT Left 2/23/2021    Procedure: DEBRIDEMENT, LEFT HEEL;  Surgeon: Mayra Schroeder DPM;  Location: [facility] OR [address] [place] [address];  Service: Podiatry;  Laterality: Left;    FOOT AMPUTATION  October 2010    left high midfoot amputation       Review of patient's allergies indicates:  No Known Allergies    No current facility-administered medications on file prior to encounter.     Current Outpatient Medications on File Prior to Encounter   Medication Sig    acetaminophen (TYLENOL) 325 MG tablet Take 650 mg by mouth every 6 (six) hours as needed for Temperature greater than.    acidophilus-pectin, citrus 100 million cell-10 mg Cap Take 1 capsule by mouth 2 (two) times a day.    apixaban (ELIQUIS) 5 mg Tab Take 1 tablet (5 mg total) by mouth 2 (two) times daily.    ascorbic acid, vitamin C, (VITAMIN C) 500 MG tablet Take 500 mg by mouth 2 (two) times daily.    B COMPLEX-VITAMIN B12 tablet Take 1 tablet by mouth once daily.    cefepime HCl (CEFEPIME 2 G/50 ML D5W, READY TO MIX,) Inject 2 g into the vein every 12 (twelve) hours as needed (for infection of the skin).    clopidogreL (PLAVIX) 75 mg tablet Take 1 tablet (75 mg total) by mouth once daily.    diphenhydrAMINE (BENADRYL) 25 mg capsule No directions listed on  "the patients medication list.    furosemide (LASIX) 40 MG tablet Take 40 mg by mouth once daily.    gabapentin (NEURONTIN) 100 MG capsule Take 2 capsules (200 mg total) by mouth 2 (two) times daily. (Patient taking differently: Take 300 mg by mouth once daily.)    glipiZIDE (GLUCOTROL) 10 MG tablet Take 20 mg by mouth 2 (two) times a day.    insulin aspart U-100 (NOVOLOG) 100 unit/mL (3 mL) InPn pen Inject 12 Units into the skin 3 (three) times daily with meals. (Patient taking differently: Inject 3 Units into the skin 3 (three) times daily with meals.)    isosorbide dinitrate (ISORDIL) 10 MG tablet Take 1 tablet (10 mg total) by mouth 3 (three) times daily.    LANTUS SOLOSTAR U-100 INSULIN glargine 100 units/mL SubQ pen Inject 45 Units into the skin every evening. (Patient taking differently: Inject 45 Units into the skin once daily.)    linezolid in dextrose 5% (LINEZOLID/PLACEBO) 600 mg/300 mL SolP IVPB Inject 600 mg into the vein once daily. For skin infection    losartan-hydrochlorothiazide 100-25 mg (HYZAAR) 100-25 mg per tablet Take 1 tablet by mouth once daily.    metFORMIN (GLUMETZA) 1000 MG (MOD) 24hr tablet Take 1,000 mg by mouth Daily.    metroNIDAZOLE (FLAGYL) 500 MG tablet Take 500 mg by mouth every 8 (eight) hours.    multivitamin (THERAGRAN) per tablet Take 1 tablet by mouth once daily.    oxyCODONE 5 mg TbOr Take 1 tablet by mouth every 6 (six) hours as needed (Pain (Max 5 daily)).    pantoprazole (PROTONIX) 40 MG tablet Take 40 mg by mouth once daily. Before breakfast    rosuvastatin (CRESTOR) 40 MG Tab Take 40 mg by mouth once daily.    sodium hypochlorite 0.5 % (DAKIN'S SOLUTION) external solution Apply topically once daily.    tamsulosin (FLOMAX) 0.4 mg Cap Take 0.4 mg by mouth once daily.    triamcinolone acetonide 0.025% (KENALOG) 0.025 % Oint Apply topically 2 (two) times daily as needed (to affected area).    BD ULTRA-FINE ADITYA PEN NEEDLE 32 gauge x 5/32" Ndle USE FOUR TIMES DAILY WITH MEALS " "AND NIGHTLY    blood sugar diagnostic (CONTOUR TEST STRIPS) Strp Inject 1 strip into the skin 2 (two) times daily before meals.    MICROLET LANCET Misc     pen needle, diabetic 32 gauge x 5/32" Ndle use as directed with insulin    [DISCONTINUED] hydroCHLOROthiazide (HYDRODIURIL) 25 MG tablet Take 0.5 tablets (12.5 mg total) by mouth every Mon, Wed, Fri. Beginning on 7/4/2022     Family History       Problem Relation (Age of Onset)    Cancer Brother    Diabetes Mother, Sister    Heart disease Mother    Stroke Sister          Tobacco Use    Smoking status: Never    Smokeless tobacco: Never   Substance and Sexual Activity    Alcohol use: No     Comment: occassional    Drug use: No    Sexual activity: Not Currently     Review of Systems   Constitutional: Negative for chills, fever and night sweats.   HENT:  Negative for congestion and hearing loss.    Eyes:  Negative for blurred vision, discharge, pain and visual disturbance.   Cardiovascular:  Negative for chest pain, dyspnea on exertion, leg swelling, near-syncope and palpitations.   Respiratory:  Negative for cough, hemoptysis, shortness of breath and wheezing.    Endocrine: Negative for cold intolerance and heat intolerance.   Skin:  Negative for flushing.   Musculoskeletal:  Negative for muscle weakness, myalgias, neck pain and stiffness.   Gastrointestinal:  Negative for abdominal pain, constipation, diarrhea, nausea and vomiting.   Genitourinary:  Negative for dysuria and hematuria.   Neurological:  Negative for focal weakness, headaches, numbness and paresthesias.   Psychiatric/Behavioral:  Negative for altered mental status and memory loss. The patient is not nervous/anxious.      Objective:     Vital Signs (Most Recent):  Temp: 98.1 °F (36.7 °C) (10/03/23 1505)  Pulse: 69 (10/03/23 1505)  Resp: (!) 24 (10/03/23 1505)  BP: (!) 171/73 (10/03/23 1505)  SpO2: 95 % (10/03/23 1505) Vital Signs (24h Range):  Temp:  [97.6 °F (36.4 °C)-98.5 °F (36.9 °C)] 98.1 °F (36.7 " °C)  Pulse:  [] 69  Resp:  [17-24] 24  SpO2:  [94 %-100 %] 95 %  BP: (136-218)/() 171/73       Weight: 112.5 kg (248 lb)  Body mass index is 36.62 kg/m².    SpO2: 95 %        Physical Exam  Vitals reviewed.   Constitutional:       Appearance: He is well-developed.   HENT:      Head: Normocephalic and atraumatic.   Eyes:      Conjunctiva/sclera: Conjunctivae normal.      Pupils: Pupils are equal, round, and reactive to light.   Neck:      Vascular: Normal carotid pulses. No carotid bruit or JVD.   Cardiovascular:      Rate and Rhythm: Tachycardia present. Rhythm irregular.      Heart sounds: No murmur heard.     No friction rub. No gallop.   Pulmonary:      Effort: Pulmonary effort is normal. No respiratory distress.   Abdominal:      General: There is no distension.      Palpations: Abdomen is soft.      Tenderness: There is no abdominal tenderness.   Musculoskeletal:      Cervical back: Normal range of motion and neck supple.   Skin:     General: Skin is warm and dry.      Nails: There is no clubbing.   Neurological:      Mental Status: He is alert and oriented to person, place, and time.   Psychiatric:         Behavior: Behavior normal.            Significant Labs: All pertinent lab results from the last 24 hours have been reviewed.

## 2023-10-03 NOTE — CONSULTS
[facility]  Infectious Disease  Consult Note    Patient Name: Saji Castañeda  MRN: [id number]  Admission Date: 10/2/2023  Hospital Length of Stay: 1 days  Attending Physician: Blanca Marin MD  Primary Care Provider: [facility] -     Isolation Status: No active isolations    Patient information was obtained from patient and past medical records.      Inpatient consult to Infectious Diseases  Consult performed by: Joellen Rachel MD  Consult ordered by: Adama Lara MD        Assessment/Plan:     ID  Gram-negative sepsis  73-year-old male with history of afib, T2DM, PVD s/p left foot amputation, chronic osteomyelitis on IV antibiotics at nursing home, presented from nursing home with weakness, requiring dobutamine IV, found to have Acinetobacter bacteremia - likely source includes PICC line - also consider from left heel wound.    Recommendations:  - Continue vanc / pip-tazo for now  - Remove PICC line  - Follow-up wound cultures  - Follow-up Acinetobacter susceptibilities  - Repeat blood cultures x2 ordered for AM        Critical care time: 35 minutes   I personally spent critical care time on the following: evaluating this patient's organ dysfunction, development of treatment plan, discussing treatment plan with patient or surrogate and bedside caregivers, discussions with critical care service and/or consultants, evaluation of patient's response to treatment, physical examination of patient, ordering and review of treatments interventions, laboratory studies, and radiographic studies, re-evaluation of patient's condition. This critical care time did not overlap with that of any other provider of the same specialty or involve time for procedures.       Thank you for your consult. I will follow-up with patient. Please contact us if you have any additional questions.    Joellen Rachel MD  Infectious Disease  [facility]    Subjective:     Principal  Problem: Complete heart block    HPI: 73-year-old male with history of afib, T2DM, PVD s/p left foot amputation, chronic osteomyelitis, presented from nursing home with weakness. Patient found to have mobitz type 1 AV block, admitted to CCU. Patient started on dobutamine IV. ID consulted for management of osteomyelitis of left foot. He was being treated with linezolid, cefepime, metronidazole at his nursing home. He had a left PICC line. Patient found to have Acinetobacter baumanii bacteremia. Patient reported feeling better compared to admission, denied having any pain.      Past Medical History:   Diagnosis Date    Arthritis     legs    Diabetes mellitus     Diabetes mellitus, type 2     Hyperlipidemia     Hypertension     Osteomyelitis        Past Surgical History:   Procedure Laterality Date    ANGIOGRAPHY OF LOWER EXTREMITY N/A 2/3/2021    Procedure: Angiogram Extremity Bilateral;  Surgeon: Ernst Chacko MD;  Location: [facility] OR [address] [place] [address];  Service: Peripheral Vascular;  Laterality: N/A;  7.4 mintues fluoroscopy time  816.15 mGy  170.17 Gycm2    AORTOGRAPHY WITH EXTREMITY RUNOFF Bilateral 2/3/2021    Procedure: AORTOGRAM, WITH EXTREMITY RUNOFF;  Surgeon: Ernst Chacko MD;  Location: [facility] OR [address] [place] [address];  Service: Peripheral Vascular;  Laterality: Bilateral;    DEBRIDEMENT OF FOOT Left 2/23/2021    Procedure: DEBRIDEMENT, LEFT HEEL;  Surgeon: Mayra Schroeder DPM;  Location: [facility] OR [address] [place] [address];  Service: Podiatry;  Laterality: Left;    FOOT AMPUTATION  October 2010    left high midfoot amputation       Review of patient's allergies indicates:  No Known Allergies    Medications:  Medications Prior to Admission   Medication Sig    acetaminophen (TYLENOL) 325 MG tablet Take 650 mg by mouth every 6 (six) hours as needed for Temperature greater than.    acidophilus-pectin, citrus 100 million cell-10 mg Cap Take 1 capsule by mouth 2 (two) times a day.    apixaban (ELIQUIS) 5 mg Tab Take 1 tablet  (5 mg total) by mouth 2 (two) times daily.    ascorbic acid, vitamin C, (VITAMIN C) 500 MG tablet Take 500 mg by mouth 2 (two) times daily.    B COMPLEX-VITAMIN B12 tablet Take 1 tablet by mouth once daily.    cefepime HCl (CEFEPIME 2 G/50 ML D5W, READY TO MIX,) Inject 2 g into the vein every 12 (twelve) hours as needed (for infection of the skin).    clopidogreL (PLAVIX) 75 mg tablet Take 1 tablet (75 mg total) by mouth once daily.    diphenhydrAMINE (BENADRYL) 25 mg capsule No directions listed on the patients medication list.    furosemide (LASIX) 40 MG tablet Take 40 mg by mouth once daily.    gabapentin (NEURONTIN) 100 MG capsule Take 2 capsules (200 mg total) by mouth 2 (two) times daily. (Patient taking differently: Take 300 mg by mouth once daily.)    glipiZIDE (GLUCOTROL) 10 MG tablet Take 20 mg by mouth 2 (two) times a day.    insulin aspart U-100 (NOVOLOG) 100 unit/mL (3 mL) InPn pen Inject 12 Units into the skin 3 (three) times daily with meals. (Patient taking differently: Inject 3 Units into the skin 3 (three) times daily with meals.)    isosorbide dinitrate (ISORDIL) 10 MG tablet Take 1 tablet (10 mg total) by mouth 3 (three) times daily.    LANTUS SOLOSTAR U-100 INSULIN glargine 100 units/mL SubQ pen Inject 45 Units into the skin every evening. (Patient taking differently: Inject 45 Units into the skin once daily.)    linezolid in dextrose 5% (LINEZOLID/PLACEBO) 600 mg/300 mL SolP IVPB Inject 600 mg into the vein once daily. For skin infection    losartan-hydrochlorothiazide 100-25 mg (HYZAAR) 100-25 mg per tablet Take 1 tablet by mouth once daily.    metFORMIN (GLUMETZA) 1000 MG (MOD) 24hr tablet Take 1,000 mg by mouth Daily.    metroNIDAZOLE (FLAGYL) 500 MG tablet Take 500 mg by mouth every 8 (eight) hours.    multivitamin (THERAGRAN) per tablet Take 1 tablet by mouth once daily.    oxyCODONE 5 mg TbOr Take 1 tablet by mouth every 6 (six) hours as needed (Pain (Max 5 daily)).     "pantoprazole (PROTONIX) 40 MG tablet Take 40 mg by mouth once daily. Before breakfast    rosuvastatin (CRESTOR) 40 MG Tab Take 40 mg by mouth once daily.    sodium hypochlorite 0.5 % (DAKIN'S SOLUTION) external solution Apply topically once daily.    tamsulosin (FLOMAX) 0.4 mg Cap Take 0.4 mg by mouth once daily.    triamcinolone acetonide 0.025% (KENALOG) 0.025 % Oint Apply topically 2 (two) times daily as needed (to affected area).    BD ULTRA-FINE ADITYA PEN NEEDLE 32 gauge x 5/32" Ndle USE FOUR TIMES DAILY WITH MEALS AND NIGHTLY    blood sugar diagnostic (CONTOUR TEST STRIPS) Strp Inject 1 strip into the skin 2 (two) times daily before meals.    MICROLET LANCET Misc     pen needle, diabetic 32 gauge x 5/32" Ndle use as directed with insulin     Antibiotics (From admission, onward)      Start     Stop Route Frequency Ordered    10/03/23 1515  vancomycin 1,250 mg in dextrose 5 % (D5W) 250 mL IVPB (Vial-Mate)         -- IV Once 10/03/23 1513    10/03/23 0100  piperacillin-tazobactam (ZOSYN) 4.5 g in dextrose 5 % in water (D5W) 100 mL IVPB (MB+)         -- IV Every 8 hours (non-standard times) 10/02/23 1542    10/02/23 1648  vancomycin - pharmacy to dose  (vancomycin IVPB (PEDS and ADULTS))        See Hyperspace for full Linked Orders Report.    -- IV pharmacy to manage frequency 10/02/23 1549          Antifungals (From admission, onward)      None          Antivirals (From admission, onward)      None             Immunization History   Administered Date(s) Administered    Influenza - Trivalent (ADULT) 10/08/2020    PPD Test 12/09/2020, 03/02/2021, 06/21/2022    Pneumococcal Conjugate - 13 Valent 10/16/2018       Family History       Problem Relation (Age of Onset)    Cancer Brother    Diabetes Mother, Sister    Heart disease Mother    Stroke Sister          Social History     Socioeconomic History    Marital status: Single   Tobacco Use    Smoking status: Never    Smokeless tobacco: Never   Substance " and Sexual Activity    Alcohol use: No     Comment: occassional    Drug use: No    Sexual activity: Not Currently   Social History Narrative    Not currently working; lives with family     Social Determinants of Health     Financial Resource Strain: Low Risk  (10/3/2023)    Overall Financial Resource Strain (CARDIA)     Difficulty of Paying Living Expenses: Not hard at all   Food Insecurity: No Food Insecurity (10/3/2023)    Hunger Vital Sign     Worried About Running Out of Food in the Last Year: Never true     Ran Out of Food in the Last Year: Never true   Transportation Needs: No Transportation Needs (10/3/2023)    PRAPARE - Transportation     Lack of Transportation (Medical): No     Lack of Transportation (Non-Medical): No   Physical Activity: Unknown (10/3/2023)    Exercise Vital Sign     Days of Exercise per Week: Patient refused     Minutes of Exercise per Session: Patient refused   Stress: Stress Concern Present (5/23/2023)    [employer] [place] - Occupational Stress Questionnaire     Feeling of Stress : Rather much   Social Connections: Moderately Isolated (10/3/2023)    Social Connection and Isolation Panel [NHANES]     Frequency of Communication with Friends and Family: More than three times a week     Frequency of Social Gatherings with Friends and Family: Patient refused     Attends [employer] Services: More than 4 times per year     Active Member of Clubs or Organizations: No     Attends Club or Organization Meetings: Never     Marital Status: Never    Housing Stability: Low Risk  (10/3/2023)    Housing Stability Vital Sign     Unable to Pay for Housing in the Last Year: No     Number of Places Lived in the Last Year: 1     Unstable Housing in the Last Year: No     Review of Systems   Constitutional:  Negative for chills, diaphoresis and fever.   HENT:  Negative for rhinorrhea and sore throat.    Respiratory:  Negative for cough and shortness of breath.     Cardiovascular:  Negative for chest pain and leg swelling.   Gastrointestinal:  Negative for abdominal pain, diarrhea, nausea and vomiting.   Genitourinary:  Negative for dysuria and hematuria.   Musculoskeletal:  Negative for arthralgias and myalgias.   Skin:  Negative for rash.   Neurological:  Negative for headaches.     Objective:     Vital Signs (Most Recent):  Temp: 98.1 °F (36.7 °C) (10/03/23 1505)  Pulse: 82 (10/03/23 1702)  Resp: (!) 23 (10/03/23 1702)  BP: (!) 176/67 (10/03/23 1702)  SpO2: 95 % (10/03/23 1702) Vital Signs (24h Range):  Temp:  [97.6 °F (36.4 °C)-98.5 °F (36.9 °C)] 98.1 °F (36.7 °C)  Pulse:  [] 82  Resp:  [17-24] 23  SpO2:  [94 %-100 %] 95 %  BP: (136-218)/() 176/67     Weight: 112.5 kg (248 lb)  Body mass index is 36.62 kg/m².    Estimated Creatinine Clearance: 33.4 mL/min (A) (based on SCr of 2.4 mg/dL (H)).     Physical Exam  Vitals reviewed.   Constitutional:       General: He is not in acute distress.     Appearance: He is well-developed. He is not diaphoretic.   HENT:      Head: Normocephalic and atraumatic.      Nose: Nose normal.   Eyes:      Conjunctiva/sclera: Conjunctivae normal.   Pulmonary:      Effort: Pulmonary effort is normal. No respiratory distress.   Abdominal:      General: Abdomen is flat. There is no distension.   Musculoskeletal:      Cervical back: Normal range of motion.      Right lower leg: No edema.      Left lower leg: No edema.   Skin:     General: Skin is warm and dry.      Findings: No erythema or rash.      Comments: Right anterior foot wound. Left lower extremity dressed. Bilateral legs with skin thickening. Left arm with picc line, soiled dressing   Neurological:      Mental Status: He is alert.   Psychiatric:         Behavior: Behavior normal.          Significant Labs: All pertinent labs within the past 24 hours have been reviewed.    Significant Imaging: I have reviewed all pertinent imaging results/findings within the past 24  hours.

## 2023-10-03 NOTE — ASSESSMENT & PLAN NOTE
Chronic OM of L foot    Came from NH with IV linezolid, cefepime, and metronidazole for chronic osteo.    -podiatry following, appreciate recs  -ID consultation for assistance in management  -PICC line removal + replacement  -continue Vanc+Zosyn  -f/u wound culture, bcx

## 2023-10-03 NOTE — MEDICAL/APP STUDENT
"[facility] - Medical ICU  Infectious Disease  Consult Note    Patient Name: Saji Castañeda  YOB: 1950  Admission Date: 10/2/2023   Hospital Length of Stay: 1   Code Status: Full Code   Attending Provider: Blanca Marin MD  Primary Care Provider: [facility] -  Principal Problem: Complete heart block    SUBJECTIVE:     HPI:  No notes on file  Patient is a 73 year old male with PVD c/b chronic bilateral OM with left foot amputation, paroxysmal atrial fibrillation on Eliquis, T2DM c/b neuropathy, HTN, CKD, PVD, VTE of RUE in June 2022, and asymptomatic Mobitz Type 1 AV block. The patient presented from [facility] due to concerns of weakness and feeling like he was going to die. Patient reports that he does not feel any dizziness or headaches on the time of admission encounter. He states he knows his name and where he is and the current year (and accurately reports "Saji Castañeda" and "[facility]" and "2023") but had trouble remembering which nursing home he came from (he said he forgot).   CBG on arrival was in the 20s, given dextrose in ED, CBG improved to 96 mg/dL.  YNES (baseline Cr 1.3) to 2.6  Lactic acid elevation 2.29    History of recurrent infections, including with Klebsiella, Pseudomonas, VRE, and proteus.      Review of Systems   Unable to perform ROS: Mental status change       OBJECTIVE:     Vital Signs (Most Recent):  Temp: 98.1 °F (36.7 °C)  Pulse: 69  Resp: (!) 24  BP: (!) 171/73  SpO2: 95 %    Vital Signs (24h Range):  Temp:  [97.6 °F (36.4 °C)-98.5 °F (36.9 °C)] 98.1 °F (36.7 °C)  Pulse:  [] 69  Resp:  [17-24] 24  SpO2:  [94 %-100 %] 95 %  BP: (136-218)/() 171/73     Weight: 112.5 kg (248 lb)   Body mass index is 36.62 kg/m².    I & O (Last 24H):  Intake/Output Summary (Last 24 hours) at 10/3/2023 1651  Last data filed at 10/3/2023 1543  Gross per 24 hour   Intake 2140.11 ml   Output --   Net 2140.11 ml       Net IO Since Admission: 2,301.36 mL " [10/03/23 1651]    Physical Exam  Vitals and nursing note reviewed.   Constitutional:       General: He is not in acute distress.     Appearance: He is obese.   HENT:      Head: Normocephalic and atraumatic.   Eyes:      Extraocular Movements: Extraocular movements intact.      Pupils: Pupils are equal, round, and reactive to light.   Cardiovascular:      Rate and Rhythm: Tachycardia present. Rhythm irregular.   Pulmonary:      Effort: Pulmonary effort is normal.   Abdominal:      General: There is no distension.      Palpations: Abdomen is soft.      Tenderness: There is no abdominal tenderness.   Musculoskeletal:         General: Normal range of motion.      Cervical back: Normal range of motion.      Left Lower Extremity: Left leg is amputated below ankle.   Skin:     General: Skin is warm and dry.   Neurological:      Mental Status: He is alert and oriented to person, place, and time. He is confused.      GCS: GCS eye subscore is 4. GCS verbal subscore is 4. GCS motor subscore is 6.      Motor: Motor function is intact.            Lines/Drains/Airways       Peripherally Inserted Central Catheter Line  Duration             PICC Double Lumen 10/02/23 0000 left basilic 1 day              Central Venous Catheter Line  Duration                  Percutaneous Central Line Insertion/Assessment - Cordis 10/02/23 1650 Internal Jugular Right 1 day              Drain  Duration             Male External Urinary Catheter 10/03/23 0844 Small <1 day                    Significant Labs:    CBC  Recent Labs   Lab 10/02/23  1213 10/03/23  0439   WBC 7.50 8.53   HGB 8.5* 8.7*   HCT 26.9* 28.1*    174       CMP  Recent Labs   Lab 10/02/23  1213 10/03/23  0439    139   K 4.0 3.6    106   CO2 25 27   ANIONGAP 9 6*   BUN 48* 43*   CREATININE 2.6* 2.4*   GLU 43* 39*   CALCIUM 9.0 9.0   MG 1.8 1.8   PHOS 3.3  --    ALKPHOS 38* 39*   ALT 13 12   AST 16 17   ALBUMIN 2.3* 2.3*   PROT 6.7 6.5   BILITOT 0.2 0.3      Lab  "Results   Component Value Date    INR 1.1 09/18/2022    INR 1.0 02/20/2014    INR 1.4 (H) 02/19/2014     Lab Results   Component Value Date    HGBA1C 11.0 (H) 09/13/2023     Recent Labs     10/02/23  1331 10/02/23  1354 10/02/23  1435 10/03/23  0611 10/03/23  0640 10/03/23  0859 10/03/23  1113 10/03/23  1608   POCTGLUCOSE 139* 123* 96 71 105 76 157* 241*       Recent Labs     10/03/23  1310   CRP 11.2*   SEDRATE 108*   PROCAL 0.24        No results for input(s): "CPK", "CPKMB", "TROPONINI", "MB" in the last 24 hours.     Urinalysis  No results for input(s): "COLORU", "CLARITYU", "SPECGRAV", "PHUR", "PROTEINUA", "GLUCOSEU", "BILIRUBINCON", "BLOODU", "WBCU", "RBCU", "BACTERIA", "MUCUS", "NITRITE", "LEUKOCYTESUR", "UROBILINOGEN", "HYALINECASTS" in the last 24 hours.       Microbiology  Microbiology Results (last 7 days)       Procedure Component Value Units Date/Time    Gram stain [[id number]] Collected: 10/03/23 1216    Order Status: Sent Specimen: Wound from Foot, Left Updated: 10/03/23 1358    Culture, Anaerobe [[id number]] Collected: 10/03/23 1216    Order Status: Sent Specimen: Wound from Foot, Left Updated: 10/03/23 1357    Aerobic culture [[id number]] Collected: 10/03/23 1216    Order Status: Sent Specimen: Wound from Foot, Left Updated: 10/03/23 1357    Blood culture [[id number]] Collected: 10/02/23 1701    Order Status: Completed Specimen: Blood from Line, PICC Left Basilic Updated: 10/03/23 0915     Blood Culture, Routine Gram stain aer bottle: Gram negative rods      Gram stain jumana bottle: Gram negative rods      Results called to and read back by:Alejandra Figueroa RN 10/03/2023  06:29    Rapid Organism ID by PCR (from Blood culture) [[id number]]  (Abnormal) Collected: 10/02/23 1701    Order Status: Completed Updated: 10/03/23 0912     Enterococcus faecalis Not Detected     Enterococcus faecium Not Detected     Listeria monocytogenes Not Detected     Staphylococcus spp. Not Detected     Staphylococcus aureus " Not Detected     Staphylococcus epidermidis Not Detected     Staphylococcus lugdunensis Not Detected     Streptococcus species Not Detected     Streptococcus agalactiae Not Detected     Streptococcus pneumoniae Not Detected     Streptococcus pyogenes Not Detected     Acinetobacter calcoaceticus/baumannii complex Detected     Bacteroides fragilis Not Detected     Enterobacterales Not Detected     Enterobacter cloacae complex Not Detected     Escherichia coli Not Detected     Klebsiella aerogenes Not Detected     Klebsiella oxytoca Not Detected     Klebsiella pneumoniae group Not Detected     Proteus Not Detected     Salmonella sp Not Detected     Serratia marcescens Not Detected     Haemophilus influenzae Not Detected     Neisseria meningtidis Not Detected     Pseudomonas aeruginosa Not Detected     Stenotrophomonas maltophilia Not Detected     Candida albicans Not Detected     Candida auris Not Detected     Candida glabrata Not Detected     Candida krusei Not Detected     Candida parapsilosis Not Detected     Candida tropicalis Not Detected     Cryptococcus neoformans/gattii Not Detected     CTX-M (ESBL ) Not Detected     IMP (Carbapenem resistant) Not Detected     KPC resistance gene (Carbapenem resistant) Not Detected     mcr-1  Test Not Applicable     mec A/C  Test Not Applicable     mec A/C and MREJ (MRSA) gene Test Not Applicable     NDM (Carbapenem resistant) Not Detected     OXA-48-like (Carbapenem resistant) Test Not Applicable     van A/B (VRE gene) Test Not Applicable     VIM (Carbapenem resistant) Not Detected    Blood culture [[id number]] Collected: 10/02/23 1701    Order Status: Completed Specimen: Blood from Line, PICC Left Basilic Updated: 10/03/23 0630     Blood Culture, Routine Gram stain aer bottle: Gram negative rods      Gram stain jumana bottle: Gram negative rods      Results called to and read back by:Alejandra Figueroa RN 10/03/2023  06:29    Urine culture [[id number]] Collected: 10/02/23  1554    Order Status: No result Specimen: Urine Updated: 10/02/23 1738    Aerobic culture [[id number]]     Order Status: No result Specimen: Skin from Ankle, Right              Significant Imaging:  Imaging  Imaging Results              CT Head Without Contrast (Final result)  Result time 10/03/23 00:44:35      Final result by Radha Vargas MD (10/03/23 00:44:35)                   Impression:      1. No CT evidence of acute intracranial abnormality.  If there is clinical concern for acute ischemia, further evaluation with MRI is advised if there are no clinical contraindications.  2. Generalized cerebral volume loss and microangiopathic change.  3. Findings concerning for acute left maxillary sinusitis.  Clinical correlation advised.      Electronically signed by: Radha Vargas MD  Date:    10/03/2023  Time:    00:44               Narrative:    EXAMINATION:  CT HEAD WITHOUT CONTRAST    CLINICAL HISTORY:  Neuro deficit, acute, stroke suspected;    TECHNIQUE:  Low dose axial images were obtained through the head.  Coronal and sagittal reformations were also performed. Contrast was not administered.    COMPARISON:  None.    FINDINGS:  There is posterior right scalp soft tissue induration.  There is no acute intracranial hemorrhage, hydrocephalus, midline shift or mass effect. There is generalized cerebral volume loss compensatory prominence of cerebral sulci and the ventricular system.  Gray-white matter differentiation appears maintained with microangiopathic change.  If there is clinical concern for acute ischemia, further assessment with MRI is advised if there are no clinical contraindications.  Basal cisterns are patent.  There is mucosal thickening and fluid/aerated secretions in the left maxillary sinus.  Correlation for acute sinusitis advised.  Remaining paranasal sinuses and mastoid air cells are clear.  The visualized bones of the calvarium demonstrate no acute osseous abnormality.                                        X-Ray Chest AP Portable (Final result)  Result time 10/02/23 17:54:38      Final result by Milton Dove MD (10/02/23 17:54:38)                   Impression:      As above      Electronically signed by: Milton Dove MD  Date:    10/02/2023  Time:    17:54               Narrative:    EXAMINATION:  XR CHEST AP PORTABLE    CLINICAL HISTORY:  Encounter for adjustment and management of vascular access device    TECHNIQUE:  Single frontal view of the chest was performed.    COMPARISON:  10/02/2023    FINDINGS:  Right central venous catheter tip projects at the level of the jugular/SVC junction.  Left PICC catheter tip projects in the region of the brachiocephalic.  There is no pneumothorax or significant interval detrimental change otherwise in the cardiopulmonary status since the previous exam.                                       X-Ray Chest 1 View (Final result)  Result time 10/02/23 16:33:55      Final result by Jean Claude Silveira MD (10/02/23 16:33:55)                   Impression:      1.  Interval retraction of the left-sided PICC line with the tip in the left brachiocephalic vein.    2.  Cardiomegaly with mild bilateral interstitial opacities.  No focal consolidation.      Electronically signed by: Jean Claude Silveira MD  Date:    10/02/2023  Time:    16:33               Narrative:    EXAMINATION:  XR CHEST 1 VIEW    CLINICAL HISTORY:  Congestion.    TECHNIQUE:  Single frontal view of the chest was performed.    COMPARISON:  03/15/2023    03/13/2023    09/17/2022    FINDINGS:  There is a left-sided PICC line in place.  The distal component of the catheter is difficult to identified.  There appears to have been retraction into the left brachiocephalic vein.  Monitoring EKG leads are present.    The trachea is unremarkable.  There are calcifications of the aortic knob.  The cardiomediastinal silhouette is within normal limits.  There is no evidence of free air beneath the hemidiaphragms.  There are no  pleural effusions.  There is no evidence of a pneumothorax.  There is no evidence of pneumomediastinum.  There are bilateral interstitial opacities.  There is no focal consolidation.  There are degenerative changes in the osseous structures.                                         Diagnostic Results:  Labs: Reviewed  X-Ray: Reviewed  CT: Reviewed    ASSESSMENT/PLAN:     No notes have been filed under this hospital service.  Service: Infectious Diseases    Sepsis   Blood cultures from 10/2 positive for Acinetobacter on Rapid ID PCR. UA from 10/2 indicative of UTI, culture pending. Potential sources: contaminated PICC line vs urosepsis vs OM. Has history of bacteremia, most recently with Proteus and Staph in Mar 2023.     - follow up susceptibility and urine cultures   - continue Vanc/Zosyn  - narrow antibiotics based on susceptibility results  - replace PICC line    Chronic osteomyelitis  Chronic bilateral lower extremity OM with history cultures positive for Klebsiella, Pseudomonas, VRE, and Proteus. Was started on Linezolid, cefepime, and metronidazole at NH prior to hospitalization. This was switched to Zosyn and Vancomycin.     - follow up wound cultures  - continue antibiotics         Critical care was time spent personally by me on the following activities: development of treatment plan with patient or surrogate and bedside caregivers, discussions with consultants, evaluation of patient's response to treatment, examination of patient, ordering and performing treatments and interventions, ordering and review of laboratory studies, ordering and review of radiographic studies, pulse oximetry, re-evaluation of patient's condition. This critical care time did not overlap with that of any other provider or involve time for any procedures.      Kaleb Tanner, MS4  [facility]

## 2023-10-03 NOTE — ASSESSMENT & PLAN NOTE
Chronotropic response to dobutamine. At baseline patient has a narrow escape with rate of 42. No evidence of hypoperfusion or hypotension from bradycardia. He has not required TVP. He will need ID clearance before consideration of PPM.

## 2023-10-03 NOTE — HPI
Consult reason: bradycardia    HPI: 72 y/o M with diabetes, CLTI with active ulcer and osteomyelitis, CKD, paroxysmal AF on eliquis who was brought from nursing home due to weakness and feeling like he is going to die. In the ED he was found to have bradycardia with junctional escape. He was started on dobutamine with improvement of his heart rate, however now with HR 110s and irregular. PCR of his blood was positive for acinetobacter. Previous cultures positive for pseudomonas and klebsiella. EP consulted for evaluation.    anticoagulation: eliquis

## 2023-10-03 NOTE — HOSPITAL COURSE
Pt with osteomyelitis admitted for encephalopathy and weakness concerning for septic shock. EKG with bradycardia and complete heart block so admitted to CCU. Started on dobutamine for inotropic support in setting of hypertension. Pt started on Vanc and Zosyn. BCx +GNR bacteremia with PCR positive for Acinetobacter, Klebsiella pneumoniae. Due to complaints of aphasia, CT head ordered which did not show acute stroke. MRI also without acute ischemia. ID consulted as pt has been on Linezolid, Cefepime, and Flagyl for osteomyelitis. PICC line removed and access replaced in anticipation for long-term IV abx. Podiatry following who recommended extremities XR, wound cultured, and wound care consult. Course also with worsening acute on chronic renal failure likely due to sepsis. Renal US without obstruction, pt given fluid resuscitation. With treatment of bacteremia, pt's heart rate able to augment appropriately with exertion with p waves transmitting. Repeat EKG showing 2nd degree AV block which replaced junctional rhythm.

## 2023-10-03 NOTE — ASSESSMENT & PLAN NOTE
Insulin at NH, persistent hypoglycemia now rebounded back to hyperglycemia    -LDSSI due to renal dysfunction  -diabetic diet, dysphagia

## 2023-10-03 NOTE — ASSESSMENT & PLAN NOTE
73M w/ history of PVD c/b chronic bilateral OM with left foot s/p chopart amputation, pAfib on Eliquis, DM2 c/b neuropathy, HTN, CKD, VTE of RUE in June 2022, and asymptomatic Mobitz Type 1 AV block who is admitted for new found complete heart block and acinetobacter bacteremia likely source being PICC line. Vascular surgery consulted to evaluate. Patient with flow to bilateral feet. Wounds appear to be very slowly healing (since his last admission in May). It appears that he may be able to heal his wounds. He would benefit from continued wound care during this admission as well as elevation and compression.     -- no acute surgical intervention indicated   -- podiatry on board, appreciate assistance  -- wounds appear to be slowly healing, granulation tissue in wound bases  -- recommend wound care consult  -- compression and elevation   -- will follow up MARIO/pvrs   -- remainder of care per primary team

## 2023-10-03 NOTE — ED NOTES
Spoke with provider, parameters for pulse rate while on continuous Dobutamine should be 60 and above.

## 2023-10-03 NOTE — PLAN OF CARE
[facility] - Cardiac Intensive Care  Initial Discharge Assessment       Primary Care Provider: [facility] -    Admission Diagnosis: Complete heart block [I44.2]  Complete AV block [I44.2]  Weakness [R53.1]  Hypoglycemia [E16.2]  Encounter for central line placement [Z45.2]  YNES (acute kidney injury) [N17.9]    Admission Date: 10/2/2023  Expected Discharge Date: 10/10/2023    Transition of Care Barriers: None    Payor: [employer] MEDICARE / Plan: [employer] HMO PPO SPECIAL NEEDS / Product Type: Medicare Advantage /     Extended Emergency Contact Information  Primary Emergency Contact: Kandy Casatñeda   [place]  Mobile Phone: [phone]  Relation: Sister  Secondary Emergency Contact: January Nicholson   [place]  Mobile Phone: [phone]  Relation: Sister    Discharge Plan A: Home with family, Home Health  Discharge Plan B: Skilled Nursing Facility      [id number] [place] [facility] [address] [facility]  [address]  [facility] [place] [address]  Phone: [phone] Fax: [phone]      Initial Assessment (most recent)       Adult Discharge Assessment - 10/03/23 1607          Discharge Assessment    Assessment Type Discharge Planning Assessment     Confirmed/corrected address, phone number and insurance Yes     Confirmed Demographics Correct on Facesheet     Source of Information patient;health record     Communicated OXANA with patient/caregiver Date not available/Unable to determine     Reason For Admission Complete heart block     People in Home sibling(s)     Facility Arrived From: [facility]     Do you have help at home or someone to help you manage your care at home? Yes     Who are your caregiver(s) and their phone number(s)? Kandy Carnes (sister) [phone], January Nicholson (sister) [phone]     Prior to hospitilization cognitive status: Alert/Oriented     Current cognitive status:  Alert/Oriented     Walking or Climbing Stairs ambulation difficulty, requires equipment     Mobility Management rollator     Dressing/Bathing bathing difficulty, assistance 1 person;dressing difficulty, assistance 1 person     Dressing/Bathing Management nursing home staff     Home Layout Able to live on 1st floor     Equipment Currently Used at Home rollator     Readmission within 30 days? No     Patient currently being followed by outpatient case management? No     Do you currently have service(s) that help you manage your care at home? No     Do you take prescription medications? Yes     Do you have prescription coverage? Yes     Coverage [employer]     Do you have any problems affording any of your prescribed medications? No     Is the patient taking medications as prescribed? yes     Who is going to help you get home at discharge? Family     How do you get to doctors appointments? family or friend will provide     Are you on dialysis? No     Do you take coumadin? No     DME Needed Upon Discharge  none     Discharge Plan discussed with: Patient     Transition of Care Barriers None     Discharge Plan A Home with family;Home Health     Discharge Plan B Skilled Nursing Facility        Physical Activity    On average, how many days per week do you engage in moderate to strenuous exercise (like a brisk walk)? Patient refused     On average, how many minutes do you engage in exercise at this level? Patient refused        Financial Resource Strain    How hard is it for you to pay for the very basics like food, housing, medical care, and heating? Not hard at all        Housing Stability    In the last 12 months, was there a time when you were not able to pay the mortgage or rent on time? No     In the last 12 months, how many places have you lived? 1     In the last 12 months, was there a time when you did not have a steady place to sleep or slept in a shelter (including now)? No        Transportation Needs    In the past  12 months, has lack of transportation kept you from medical appointments or from getting medications? No     In the past 12 months, has lack of transportation kept you from meetings, work, or from getting things needed for daily living? No        Food Insecurity    Within the past 12 months, you worried that your food would run out before you got the money to buy more. Never true     Within the past 12 months, the food you bought just didn't last and you didn't have money to get more. Never true        Social Connections    In a typical week, how many times do you talk on the phone with family, friends, or neighbors? More than three times a week     How often do you get together with friends or relatives? Patient refused     How often do you attend Tenriism or Judaism services? More than 4 times per year     Do you belong to any clubs or organizations such as Tenriism groups, unions, fraternal or athletic groups, or school groups? No     How often do you attend meetings of the clubs or organizations you belong to? Never     Are you , , , , never , or living with a partner? Never         Alcohol Use    Q1: How often do you have a drink containing alcohol? Never     Q2: How many drinks containing alcohol do you have on a typical day when you are drinking? Patient does not drink     Q3: How often do you have six or more drinks on one occasion? Never        OTHER    Name(s) of People in Home Kandy Deyvi (sister) [phone], January Nicholson (sister) [phone]                 SW met with pt at bedside to discuss discharge planning.  Pt admitted from [facility] for SNF but lives with his two sisters and one brother in a one-story house.  Pt reported using a rollator for ambulation and getting assistance from facility staff for ADLs.  Pt will have transportation and assistance from his siblings at discharge.  Pt stated that he wants to go home at discharge, not  back to SNF.  Discharge disposition pending medical clearance.  QUINCY name and ext on whiteboard; discharge planning booklet provided.  Will continue to follow.      Anuja Ro LMSW  [facility]  [id number]

## 2023-10-03 NOTE — ASSESSMENT & PLAN NOTE
73-year-old male with history of afib, T2DM, PVD s/p left foot amputation, chronic osteomyelitis on IV antibiotics at nursing home, presented from nursing home with weakness, requiring dobutamine IV, found to have Acinetobacter bacteremia - likely source includes PICC line - also consider from left heel wound.    Recommendations:  - Continue vanc / pip-tazo for now  - Remove PICC line  - Follow-up wound cultures  - Follow-up Acinetobacter susceptibilities  - Repeat blood cultures x2 ordered for AM

## 2023-10-03 NOTE — CONSULTS
[facility] - Cardiac Intensive [facility]  Cardiac Electrophysiology  Consult Note    Admission Date: 10/2/2023  Code Status: Full Code   Attending Provider: Blanca Marin MD  Consulting Provider: Mason Lawson MD  Principal Problem:Complete heart block    Inpatient consult to Electrophysiology  Consult performed by: Mason Lawson MD  Consult ordered by: Mason Lawson MD        Subjective:     Chief Complaint:  bradycardia     HPI:   Consult reason: bradycardia    HPI: 72 y/o M with diabetes, CLTI with active ulcer and osteomyelitis, CKD, paroxysmal AF on eliquis who was brought from nursing home due to weakness and feeling like he is going to die. In the ED he was found to have bradycardia with junctional escape. He was started on dobutamine with improvement of his heart rate, however now with HR 110s and irregular. PCR of his blood was positive for acinetobacter. Previous cultures positive for pseudomonas and klebsiella. EP consulted for evaluation.    anticoagulation: eliquis      Past Medical History:   Diagnosis Date    Arthritis     legs    Diabetes mellitus     Diabetes mellitus, type 2     Hyperlipidemia     Hypertension     Osteomyelitis        Past Surgical History:   Procedure Laterality Date    ANGIOGRAPHY OF LOWER EXTREMITY N/A 2/3/2021    Procedure: Angiogram Extremity Bilateral;  Surgeon: Ernst Chacko MD;  Location: [facility] OR [address] [place] [address];  Service: Peripheral Vascular;  Laterality: N/A;  7.4 mintues fluoroscopy time  816.15 mGy  170.17 Gycm2    AORTOGRAPHY WITH EXTREMITY RUNOFF Bilateral 2/3/2021    Procedure: AORTOGRAM, WITH EXTREMITY RUNOFF;  Surgeon: Ernst Chacko MD;  Location: [facility] OR [address] [place] [address];  Service: Peripheral Vascular;  Laterality: Bilateral;    DEBRIDEMENT OF FOOT Left 2/23/2021    Procedure: DEBRIDEMENT, LEFT HEEL;  Surgeon: Mayra Schroeder DPM;  Location: [facility] OR [address] [place] [address];  Service: Podiatry;  Laterality: Left;    FOOT AMPUTATION  October 2010    left  high midfoot amputation       Review of patient's allergies indicates:  No Known Allergies    No current facility-administered medications on file prior to encounter.     Current Outpatient Medications on File Prior to Encounter   Medication Sig    acetaminophen (TYLENOL) 325 MG tablet Take 650 mg by mouth every 6 (six) hours as needed for Temperature greater than.    acidophilus-pectin, citrus 100 million cell-10 mg Cap Take 1 capsule by mouth 2 (two) times a day.    apixaban (ELIQUIS) 5 mg Tab Take 1 tablet (5 mg total) by mouth 2 (two) times daily.    ascorbic acid, vitamin C, (VITAMIN C) 500 MG tablet Take 500 mg by mouth 2 (two) times daily.    B COMPLEX-VITAMIN B12 tablet Take 1 tablet by mouth once daily.    cefepime HCl (CEFEPIME 2 G/50 ML D5W, READY TO MIX,) Inject 2 g into the vein every 12 (twelve) hours as needed (for infection of the skin).    clopidogreL (PLAVIX) 75 mg tablet Take 1 tablet (75 mg total) by mouth once daily.    diphenhydrAMINE (BENADRYL) 25 mg capsule No directions listed on the patients medication list.    furosemide (LASIX) 40 MG tablet Take 40 mg by mouth once daily.    gabapentin (NEURONTIN) 100 MG capsule Take 2 capsules (200 mg total) by mouth 2 (two) times daily. (Patient taking differently: Take 300 mg by mouth once daily.)    glipiZIDE (GLUCOTROL) 10 MG tablet Take 20 mg by mouth 2 (two) times a day.    insulin aspart U-100 (NOVOLOG) 100 unit/mL (3 mL) InPn pen Inject 12 Units into the skin 3 (three) times daily with meals. (Patient taking differently: Inject 3 Units into the skin 3 (three) times daily with meals.)    isosorbide dinitrate (ISORDIL) 10 MG tablet Take 1 tablet (10 mg total) by mouth 3 (three) times daily.    LANTUS SOLOSTAR U-100 INSULIN glargine 100 units/mL SubQ pen Inject 45 Units into the skin every evening. (Patient taking differently: Inject 45 Units into the skin once daily.)    linezolid in dextrose 5% (LINEZOLID/PLACEBO) 600 mg/300 mL SolP  "IVPB Inject 600 mg into the vein once daily. For skin infection    losartan-hydrochlorothiazide 100-25 mg (HYZAAR) 100-25 mg per tablet Take 1 tablet by mouth once daily.    metFORMIN (GLUMETZA) 1000 MG (MOD) 24hr tablet Take 1,000 mg by mouth Daily.    metroNIDAZOLE (FLAGYL) 500 MG tablet Take 500 mg by mouth every 8 (eight) hours.    multivitamin (THERAGRAN) per tablet Take 1 tablet by mouth once daily.    oxyCODONE 5 mg TbOr Take 1 tablet by mouth every 6 (six) hours as needed (Pain (Max 5 daily)).    pantoprazole (PROTONIX) 40 MG tablet Take 40 mg by mouth once daily. Before breakfast    rosuvastatin (CRESTOR) 40 MG Tab Take 40 mg by mouth once daily.    sodium hypochlorite 0.5 % (DAKIN'S SOLUTION) external solution Apply topically once daily.    tamsulosin (FLOMAX) 0.4 mg Cap Take 0.4 mg by mouth once daily.    triamcinolone acetonide 0.025% (KENALOG) 0.025 % Oint Apply topically 2 (two) times daily as needed (to affected area).    BD ULTRA-FINE ADITYA PEN NEEDLE 32 gauge x 5/32" Ndle USE FOUR TIMES DAILY WITH MEALS AND NIGHTLY    blood sugar diagnostic (CONTOUR TEST STRIPS) Strp Inject 1 strip into the skin 2 (two) times daily before meals.    MICROLET LANCET Misc     pen needle, diabetic 32 gauge x 5/32" Ndle use as directed with insulin    [DISCONTINUED] hydroCHLOROthiazide (HYDRODIURIL) 25 MG tablet Take 0.5 tablets (12.5 mg total) by mouth every Mon, Wed, Fri. Beginning on 7/4/2022     Family History       Problem Relation (Age of Onset)    Cancer Brother    Diabetes Mother, Sister    Heart disease Mother    Stroke Sister          Tobacco Use    Smoking status: Never    Smokeless tobacco: Never   Substance and Sexual Activity    Alcohol use: No     Comment: occassional    Drug use: No    Sexual activity: Not Currently     Review of Systems   Constitutional: Negative for chills, fever and night sweats.   HENT:  Negative for congestion and hearing loss.    Eyes:  Negative for blurred " vision, discharge, pain and visual disturbance.   Cardiovascular:  Negative for chest pain, dyspnea on exertion, leg swelling, near-syncope and palpitations.   Respiratory:  Negative for cough, hemoptysis, shortness of breath and wheezing.    Endocrine: Negative for cold intolerance and heat intolerance.   Skin:  Negative for flushing.   Musculoskeletal:  Negative for muscle weakness, myalgias, neck pain and stiffness.   Gastrointestinal:  Negative for abdominal pain, constipation, diarrhea, nausea and vomiting.   Genitourinary:  Negative for dysuria and hematuria.   Neurological:  Negative for focal weakness, headaches, numbness and paresthesias.   Psychiatric/Behavioral:  Negative for altered mental status and memory loss. The patient is not nervous/anxious.      Objective:     Vital Signs (Most Recent):  Temp: 98.1 °F (36.7 °C) (10/03/23 1505)  Pulse: 69 (10/03/23 1505)  Resp: (!) 24 (10/03/23 1505)  BP: (!) 171/73 (10/03/23 1505)  SpO2: 95 % (10/03/23 1505) Vital Signs (24h Range):  Temp:  [97.6 °F (36.4 °C)-98.5 °F (36.9 °C)] 98.1 °F (36.7 °C)  Pulse:  [] 69  Resp:  [17-24] 24  SpO2:  [94 %-100 %] 95 %  BP: (136-218)/() 171/73       Weight: 112.5 kg (248 lb)  Body mass index is 36.62 kg/m².    SpO2: 95 %        Physical Exam  Vitals reviewed.   Constitutional:       Appearance: He is well-developed.   HENT:      Head: Normocephalic and atraumatic.   Eyes:      Conjunctiva/sclera: Conjunctivae normal.      Pupils: Pupils are equal, round, and reactive to light.   Neck:      Vascular: Normal carotid pulses. No carotid bruit or JVD.   Cardiovascular:      Rate and Rhythm: Tachycardia present. Rhythm irregular.      Heart sounds: No murmur heard.     No friction rub. No gallop.   Pulmonary:      Effort: Pulmonary effort is normal. No respiratory distress.   Abdominal:      General: There is no distension.      Palpations: Abdomen is soft.      Tenderness: There is no abdominal tenderness.    Musculoskeletal:      Cervical back: Normal range of motion and neck supple.   Skin:     General: Skin is warm and dry.      Nails: There is no clubbing.   Neurological:      Mental Status: He is alert and oriented to person, place, and time.   Psychiatric:         Behavior: Behavior normal.            Significant Labs: All pertinent lab results from the last 24 hours have been reviewed.                  Assessment and Plan:     * Complete heart block  Chronotropic response to dobutamine. At baseline patient has a narrow escape with rate of 42. No evidence of hypoperfusion or hypotension from bradycardia. He has not required TVP. He will need ID clearance before consideration of PPM.        Thank you for your consult. I will follow-up with patient. Please contact us if you have any additional questions.    Mason Lawson MD  Cardiac Electrophysiology  [facility] - [facility]

## 2023-10-03 NOTE — PROGRESS NOTES
Pharmacokinetic Assessment Follow Up: IV Vancomycin    Vancomycin serum concentration assessment(s):    The random level was an approximately 16.5 post dose level. The measurement is within the desired definitive target range of 15 to 20 mcg/mL.  Patient has an YNES with SCR trending down 2.6->2.4mg/dL. Baseline per May values 1-1.4mg/dL. Per nurse patient is making urine but has not been able to capture amount.      Vancomycin Regimen Plan:    Redose conservatively vancomycin 1250mg x 1 dose, with a level with AM labs (approximately 12 hour post dose level).   Redose per level and renal function.     Drug levels (last 3 results):  Recent Labs   Lab Result Units 10/03/23  1114   Vancomycin, Random ug/mL 18.9       Pharmacy will continue to follow and monitor vancomycin.    Please contact pharmacy at extension [phone] for questions regarding this assessment.    Thank you for the consult,   Rosana Larkin, PharmD, BCPS, [facility] Clinical Pharmacy Specialist  EXT [phone]       Patient brief summary:  Saji Castañeda is a 73 y.o. male initiated on antimicrobial therapy with IV Vancomycin for treatment of bacteremia    The patient's current regimen is pulse dosing    Drug Allergies:   Review of patient's allergies indicates:  No Known Allergies    Actual Body Weight:   112.5kg    Renal Function:   Estimated Creatinine Clearance: 33.4 mL/min (A) (based on SCr of 2.4 mg/dL (H)).,     Dialysis Method (if applicable):  N/A    CBC (last 72 hours):  Recent Labs   Lab Result Units 10/02/23  1213 10/03/23  0439   WBC K/uL 7.50 8.53   Hemoglobin g/dL 8.5* 8.7*   Hematocrit % 26.9* 28.1*   Platelets K/uL 184 174   Gran % % 74.0* 67.5   Lymph % % 13.6* 11.3*   Mono % % 8.7 11.3   Eosinophil % % 3.1 9.1*   Basophil % % 0.5 0.6   Differential Method  Automated Automated       Metabolic Panel (last 72 hours):  Recent Labs   Lab Result Units 10/02/23  1213 10/02/23  1554 10/03/23  0439   Sodium mmol/L 139  --  139    Potassium mmol/L 4.0  --  3.6   Chloride mmol/L 105  --  106   CO2 mmol/L 25  --  27   Glucose mg/dL 43*  --  39*   Glucose, UA   --  Negative  --    BUN mg/dL 48*  --  43*   Creatinine mg/dL 2.6*  --  2.4*   Albumin g/dL 2.3*  --  2.3*   Total Bilirubin mg/dL 0.2  --  0.3   Alkaline Phosphatase U/L 38*  --  39*   AST U/L 16  --  17   ALT U/L 13  --  12   Magnesium mg/dL 1.8  --  1.8   Phosphorus mg/dL 3.3  --   --        Vancomycin Administrations:  vancomycin given in the last 96 hours                     vancomycin 2 g in dextrose 5 % 500 mL IVPB (mg) 2,000 mg New Bag 10/02/23 1821                    Microbiologic Results:  Microbiology Results (last 7 days)       Procedure Component Value Units Date/Time    Gram stain [[id number]] Collected: 10/03/23 1216    Order Status: Sent Specimen: Wound from Foot, Left Updated: 10/03/23 1358    Culture, Anaerobe [[id number]] Collected: 10/03/23 1216    Order Status: Sent Specimen: Wound from Foot, Left Updated: 10/03/23 1357    Aerobic culture [[id number]] Collected: 10/03/23 1216    Order Status: Sent Specimen: Wound from Foot, Left Updated: 10/03/23 1357    Blood culture [[id number]] Collected: 10/02/23 1701    Order Status: Completed Specimen: Blood from Line, PICC Left Basilic Updated: 10/03/23 0915     Blood Culture, Routine Gram stain aer bottle: Gram negative rods      Gram stain jumana bottle: Gram negative rods      Results called to and read back by:Alejandra Figueroa RN 10/03/2023  06:29    Rapid Organism ID by PCR (from Blood culture) [[id number]]  (Abnormal) Collected: 10/02/23 1701    Order Status: Completed Updated: 10/03/23 0912     Enterococcus faecalis Not Detected     Enterococcus faecium Not Detected     Listeria monocytogenes Not Detected     Staphylococcus spp. Not Detected     Staphylococcus aureus Not Detected     Staphylococcus epidermidis Not Detected     Staphylococcus lugdunensis Not Detected     Streptococcus species Not Detected     Streptococcus  agalactiae Not Detected     Streptococcus pneumoniae Not Detected     Streptococcus pyogenes Not Detected     Acinetobacter calcoaceticus/baumannii complex Detected     Bacteroides fragilis Not Detected     Enterobacterales Not Detected     Enterobacter cloacae complex Not Detected     Escherichia coli Not Detected     Klebsiella aerogenes Not Detected     Klebsiella oxytoca Not Detected     Klebsiella pneumoniae group Not Detected     Proteus Not Detected     Salmonella sp Not Detected     Serratia marcescens Not Detected     Haemophilus influenzae Not Detected     Neisseria meningtidis Not Detected     Pseudomonas aeruginosa Not Detected     Stenotrophomonas maltophilia Not Detected     Candida albicans Not Detected     Candida auris Not Detected     Candida glabrata Not Detected     Candida krusei Not Detected     Candida parapsilosis Not Detected     Candida tropicalis Not Detected     Cryptococcus neoformans/gattii Not Detected     CTX-M (ESBL ) Not Detected     IMP (Carbapenem resistant) Not Detected     KPC resistance gene (Carbapenem resistant) Not Detected     mcr-1  Test Not Applicable     mec A/C  Test Not Applicable     mec A/C and MREJ (MRSA) gene Test Not Applicable     NDM (Carbapenem resistant) Not Detected     OXA-48-like (Carbapenem resistant) Test Not Applicable     van A/B (VRE gene) Test Not Applicable     VIM (Carbapenem resistant) Not Detected    Blood culture [[id number]] Collected: 10/02/23 1701    Order Status: Completed Specimen: Blood from Line, PICC Left Basilic Updated: 10/03/23 0630     Blood Culture, Routine Gram stain aer bottle: Gram negative rods      Gram stain jumana bottle: Gram negative rods      Results called to and read back by:Alejandra Figueroa RN 10/03/2023  06:29    Urine culture [[id number]] Collected: 10/02/23 1554    Order Status: No result Specimen: Urine Updated: 10/02/23 1738    Aerobic culture [[id number]]     Order Status: No result Specimen: Skin from Ankle,  Right

## 2023-10-03 NOTE — SUBJECTIVE & OBJECTIVE
Medications Prior to Admission   Medication Sig Dispense Refill Last Dose    acetaminophen (TYLENOL) 325 MG tablet Take 650 mg by mouth every 6 (six) hours as needed for Temperature greater than.       acidophilus-pectin, citrus 100 million cell-10 mg Cap Take 1 capsule by mouth 2 (two) times a day.       apixaban (ELIQUIS) 5 mg Tab Take 1 tablet (5 mg total) by mouth 2 (two) times daily. 60 tablet 0     ascorbic acid, vitamin C, (VITAMIN C) 500 MG tablet Take 500 mg by mouth 2 (two) times daily.       B COMPLEX-VITAMIN B12 tablet Take 1 tablet by mouth once daily.       cefepime HCl (CEFEPIME 2 G/50 ML D5W, READY TO MIX,) Inject 2 g into the vein every 12 (twelve) hours as needed (for infection of the skin).       clopidogreL (PLAVIX) 75 mg tablet Take 1 tablet (75 mg total) by mouth once daily. 60 tablet 0     diphenhydrAMINE (BENADRYL) 25 mg capsule No directions listed on the patients medication list.       furosemide (LASIX) 40 MG tablet Take 40 mg by mouth once daily.       gabapentin (NEURONTIN) 100 MG capsule Take 2 capsules (200 mg total) by mouth 2 (two) times daily. (Patient taking differently: Take 300 mg by mouth once daily.) 120 capsule 1     glipiZIDE (GLUCOTROL) 10 MG tablet Take 20 mg by mouth 2 (two) times a day.       insulin aspart U-100 (NOVOLOG) 100 unit/mL (3 mL) InPn pen Inject 12 Units into the skin 3 (three) times daily with meals. (Patient taking differently: Inject 3 Units into the skin 3 (three) times daily with meals.) 30 mL 3     isosorbide dinitrate (ISORDIL) 10 MG tablet Take 1 tablet (10 mg total) by mouth 3 (three) times daily. 90 tablet 11     LANTUS SOLOSTAR U-100 INSULIN glargine 100 units/mL SubQ pen Inject 45 Units into the skin every evening. (Patient taking differently: Inject 45 Units into the skin once daily.)  0     linezolid in dextrose 5% (LINEZOLID/PLACEBO) 600 mg/300 mL SolP IVPB Inject 600 mg into the vein once daily. For skin infection        "losartan-hydrochlorothiazide 100-25 mg (HYZAAR) 100-25 mg per tablet Take 1 tablet by mouth once daily.       metFORMIN (GLUMETZA) 1000 MG (MOD) 24hr tablet Take 1,000 mg by mouth Daily.       metroNIDAZOLE (FLAGYL) 500 MG tablet Take 500 mg by mouth every 8 (eight) hours.       multivitamin (THERAGRAN) per tablet Take 1 tablet by mouth once daily.       oxyCODONE 5 mg TbOr Take 1 tablet by mouth every 6 (six) hours as needed (Pain (Max 5 daily)).       pantoprazole (PROTONIX) 40 MG tablet Take 40 mg by mouth once daily. Before breakfast       rosuvastatin (CRESTOR) 40 MG Tab Take 40 mg by mouth once daily.       sodium hypochlorite 0.5 % (DAKIN'S SOLUTION) external solution Apply topically once daily.       tamsulosin (FLOMAX) 0.4 mg Cap Take 0.4 mg by mouth once daily.       triamcinolone acetonide 0.025% (KENALOG) 0.025 % Oint Apply topically 2 (two) times daily as needed (to affected area).       BD ULTRA-FINE ADITYA PEN NEEDLE 32 gauge x 5/32" Ndle USE FOUR TIMES DAILY WITH MEALS AND NIGHTLY 100 each 0     blood sugar diagnostic (CONTOUR TEST STRIPS) Strp Inject 1 strip into the skin 2 (two) times daily before meals. 100 strip 6     MICROLET LANCET Misc   0     pen needle, diabetic 32 gauge x 5/32" Ndle use as directed with insulin 100 each 2        Review of patient's allergies indicates:  No Known Allergies    Past Medical History:   Diagnosis Date    Arthritis     legs    Diabetes mellitus     Diabetes mellitus, type 2     Hyperlipidemia     Hypertension     Osteomyelitis      Past Surgical History:   Procedure Laterality Date    ANGIOGRAPHY OF LOWER EXTREMITY N/A 2/3/2021    Procedure: Angiogram Extremity Bilateral;  Surgeon: Ernst Chacko MD;  Location: [facility] OR [address] [place] [address];  Service: Peripheral Vascular;  Laterality: N/A;  7.4 mintues fluoroscopy time  816.15 mGy  170.17 Gycm2    AORTOGRAPHY WITH EXTREMITY RUNOFF Bilateral 2/3/2021    Procedure: AORTOGRAM, WITH EXTREMITY RUNOFF;  Surgeon: Ernst DIAZ" MD Ricco;  Location: [facility] OR [address] [place] [address];  Service: Peripheral Vascular;  Laterality: Bilateral;    DEBRIDEMENT OF FOOT Left 2/23/2021    Procedure: DEBRIDEMENT, LEFT HEEL;  Surgeon: Mayra Schroeder DPM;  Location: [facility] OR [address] [place] [address];  Service: Podiatry;  Laterality: Left;    FOOT AMPUTATION  October 2010    left high midfoot amputation     Family History       Problem Relation (Age of Onset)    Cancer Brother    Diabetes Mother, Sister    Heart disease Mother    Stroke Sister          Tobacco Use    Smoking status: Never    Smokeless tobacco: Never   Substance and Sexual Activity    Alcohol use: No     Comment: occassional    Drug use: No    Sexual activity: Not Currently     Review of Systems   Constitutional:  Negative for chills, diaphoresis and fever.   HENT:  Negative for rhinorrhea and sore throat.    Respiratory:  Negative for cough and shortness of breath.    Cardiovascular:  Negative for chest pain and leg swelling.   Gastrointestinal:  Negative for abdominal pain, diarrhea, nausea and vomiting.   Genitourinary:  Negative for dysuria and hematuria.   Musculoskeletal:  Negative for arthralgias and myalgias.   Skin:  Negative for rash.   Neurological:  Negative for headaches.     Objective:     Vital Signs (Most Recent):  Temp: 98.1 °F (36.7 °C) (10/03/23 1505)  Pulse: 82 (10/03/23 1702)  Resp: (!) 23 (10/03/23 1702)  BP: (!) 176/67 (10/03/23 1702)  SpO2: 95 % (10/03/23 1702) Vital Signs (24h Range):  Temp:  [97.6 °F (36.4 °C)-98.5 °F (36.9 °C)] 98.1 °F (36.7 °C)  Pulse:  [] 82  Resp:  [17-24] 23  SpO2:  [94 %-100 %] 95 %  BP: (136-218)/() 176/67     Weight: 112.5 kg (248 lb)  Body mass index is 36.62 kg/m².      Physical Exam  Vitals reviewed.   Constitutional:       General: He is not in acute distress.     Appearance: He is not toxic-appearing.   HENT:      Head: Normocephalic and atraumatic.      Nose: Nose normal.   Cardiovascular:      Rate and Rhythm: Normal rate.      Comments:  Bilateral femoral pulses 2+   L AT and PT triphasic  R AT triphasic  Pulmonary:      Effort: Pulmonary effort is normal. No respiratory distress.   Abdominal:      General: Abdomen is flat.      Palpations: Abdomen is soft.   Musculoskeletal:      Comments: Bilateral lower extremities edematous with extensive flaking and scaling   Bilateral lower extremity wounds. L heel wound with granulation tissue does not probe to bone. R anterior ankle wound with granulation tissue does not probe to bone.    Neurological:      General: No focal deficit present.      Mental Status: He is alert.   Psychiatric:         Mood and Affect: Mood normal.          Significant Labs:  CBC:   Recent Labs   Lab 10/03/23  0439   WBC 8.53   RBC 3.23*   HGB 8.7*   HCT 28.1*      MCV 87   MCH 26.9*   MCHC 31.0*     CMP:   Recent Labs   Lab 10/03/23  0439   GLU 39*   CALCIUM 9.0   ALBUMIN 2.3*   PROT 6.5      K 3.6   CO2 27      BUN 43*   CREATININE 2.4*   ALKPHOS 39*   ALT 12   AST 17   BILITOT 0.3     All pertinent labs from the last 24 hours have been reviewed.    Significant Diagnostics:  I have reviewed all pertinent imaging results/findings within the past 24 hours.

## 2023-10-03 NOTE — HPI
73 year old male with PVD c/b chronic bilateral OM with left foot chopart amputation, paroxysmal atrial fibrillation on Eliquis, T2DM c/b neuropathy, HTN, CKD, PVD, VTE of RUE in June 2022, and asymptomatic Mobitz Type 1 AV block. The patient presented from [facility] due to concerns of weakness and feeling like he was going to die.  Patient was found to have complete heart block and is currently in the cardiac ICU.  Patient is a poor historian.  Podiatry was consulted for bilateral foot and lower leg wounds.  Patient says he has had these wounds for a while but does not exactly remember how long.  Patient is nonambulatory.  Patient says he receives wound care at the nursing home but does not remember which nursing home.  Patient does not follow podiatry outpatient.  Patient admits to left lower leg pain.  Patient describes his pain as aching and rates it a 10/10.  Patient has no other pedal complaints at this time.

## 2023-10-03 NOTE — HPI
Saji Castañeda is a 73 y.o. male with a history of PVD c/b chronic bilateral OM with left foot s/p chopart amputation, pAfib on Eliquis, DM2 c/b neuropathy, HTN, CKD, VTE of RUE in June 2022, and asymptomatic Mobitz Type 1 AV block. Patient originally presented from [facility] with complaints of weakness and feelings of impending doom. Was found to have complete heart block. Currently admitted to the CICU for management. Patient has a long standing history of bilateral lower extremity swelling and chronic ulcerations of bilateral lower extremities. He has been having wound care performed at his nursing home, but he cannot recall where this is. He was most recently hospitalized in May of this year with worsening of his BLE wounds at which time he was offered, and refused, bilateral AKAs. During this admission, found to have Acinetobacter bacteremia likely secondary to infected PICC line. Vascular surgery consulted to evaluate. On exam, patient with granulation tissue of wounds of BLE. Bilateral femoral pulses 2+. L AT and PT ***. R AT triphasic. Patient appears to be very slowly healing his wounds.

## 2023-10-03 NOTE — CONSULTS
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 The patient presented from [facility] due to concerns of weakness and feeling like he was going to die.  Patient was found to have complete heart block and is currently in the cardiac ICU.  Patient is a poor historian.  Podiatry was consulted for bilateral foot and lower leg wounds.  Patient says he has had these wounds for a while but does not exactly remember how long.  Patient is nonambulatory.  Patient says he receives wound care at the nursing home but does not remember which nursing home.  Patient does not follow podiatry outpatient.  Patient admits to left lower leg pain.  Patient describes his pain as aching and rates it a 10/10.  Patient has no other pedal complaints at this time.      Scheduled Meds:   apixaban  5 mg Oral BID    atorvastatin  80 mg Oral QHS    clopidogreL  75 mg Oral Daily    hydrALAZINE  50 mg Oral Q8H    piperacillin-tazobactam (Zosyn) IV (PEDS and ADULTS) (extended infusion is not appropriate)  4.5 g Intravenous Q8H     Continuous Infusions:   dextrose 10 % in water (D10W) 100 mL/hr at 10/03/23 0918    DOBUTamine IV infusion (non-titrating) 2.5 mcg/kg/min (10/03/23 0918)     PRN Meds:sodium chloride 0.9%, Pharmacy to dose Vancomycin consult **AND** vancomycin - pharmacy to  dose    Review of patient's allergies indicates:  No Known Allergies     Past Medical History:   Diagnosis Date    Arthritis     legs    Diabetes mellitus     Diabetes mellitus, type 2     Hyperlipidemia     Hypertension     Osteomyelitis      Past Surgical History:   Procedure Laterality Date    ANGIOGRAPHY OF LOWER EXTREMITY N/A 2/3/2021    Procedure: Angiogram Extremity Bilateral;  Surgeon: Ernst Chacko MD;  Location: [facility] OR [address] [place] [address];  Service: Peripheral Vascular;  Laterality: N/A;  7.4 mintues fluoroscopy time  816.15 mGy  170.17 Gycm2    AORTOGRAPHY WITH EXTREMITY RUNOFF Bilateral 2/3/2021    Procedure: AORTOGRAM, WITH EXTREMITY RUNOFF;  Surgeon: Ernst Chacko MD;  [facility];  Service: Peripheral Vascular;  Laterality: Bilateral;    DEBRIDEMENT OF FOOT Left 2/23/2021    Procedure: DEBRIDEMENT, LEFT HEEL;  Surgeon: Mayra Schroeder DPM;  Location: [facility] OR [address] [place] [address];  Service: Podiatry;  Laterality: Left;    FOOT AMPUTATION  October 2010    left high midfoot amputation       Family History       Problem Relation (Age of Onset)    Cancer Brother    Diabetes Mother, Sister    Heart disease Mother    Stroke Sister          Tobacco Use    Smoking status: Never    Smokeless tobacco: Never   Substance and Sexual Activity    Alcohol use: No     Comment: occassional    Drug use: No    Sexual activity: Not Currently     Review of Systems   Unable to perform ROS: Mental status change     Objective:     Vital Signs (Most Recent):  Temp: 98.1 °F (36.7 °C) (10/03/23 0805)  Pulse: 61 (10/03/23 0905)  Resp: (!) 22 (10/03/23 0805)  BP: (!) 164/70 (10/03/23 0905)  SpO2: 95 % (10/03/23 1006) Vital Signs (24h Range):  Temp:  [97.6 °F (36.4 °C)-98.5 °F (36.9 °C)] 98.1 °F (36.7 °C)  Pulse:  [] 61  Resp:  [17-24] 22  SpO2:  [94 %-100 %] 95 %  BP: (156-218)/(67-90) 164/70     Weight: 112.5 kg (248 lb)  Body mass index is 36.62 kg/m².    Foot Exam    General  Orientation: unable  to assess       Right Foot/Ankle     Inspection and Palpation  Skin Exam: dry skin, fissure, skin changes, abnormal color and ulcer;     Neurovascular  Dorsalis pedis: absent  Posterior tibial: absent  Saphenous nerve sensation: diminished  Tibial nerve sensation: diminished  Superficial peroneal nerve sensation: diminished  Deep peroneal nerve sensation: diminished  Sural nerve sensation: diminished    Comments  Right anterior ankle superficial wound.  Wound is granular in nature.  Diffuse xerosis noted in are round the periwound area.  No erythema noted.  Swelling noted.  No probe to bone noted.  No fluctuance crepitus or purulence noted.  Mildly tender to touch.  Mild malodor noted    Left Foot/Ankle      Inspection and Palpation  Skin Exam: dry skin, skin changes and ulcer;     Neurovascular  Dorsalis pedis: absent  Posterior tibial: absent  Saphenous nerve sensation: diminished  Tibial nerve sensation: diminished  Superficial peroneal nerve sensation: diminished  Deep peroneal nerve sensation: diminished  Sural nerve sensation: diminished    Comments  Full-thickness Heel wound that was present on admission noted that is mostly granular in nature.  Slight fibrotic edges.  No probe to bone noted.  No fluctuance crepitus or purulence noted.  Periwound area is quite tender to touch.  No periwound erythema noted.  Mild swelling noted.  Mild malodor noted     Posterior proximal wound is mostly granular in nature.  No probe to bone noted.  No fluctuance crepitus or purulence noted.  Tender to touch.  No periwound erythema noted                  Laboratory:  A1C:   Recent Labs   Lab 09/13/23  0616   HGBA1C 11.0*     Blood Cultures:   Recent Labs   Lab 10/02/23  1701   LABBLOO Gram stain aer bottle: Gram negative rods  Gram stain jumana bottle: Gram negative rods  Results called to and read back by:Alejandra Figueroa RN 10/03/2023  06:29  Gram stain aer bottle: Gram negative rods  Gram stain jumana bottle: Gram negative  "rods  Results called to and read back by:Alejandra Figueroa RN 10/03/2023  06:29     BMP:   Recent Labs   Lab 10/03/23  0439   GLU 39*      K 3.6      CO2 27   BUN 43*   CREATININE 2.4*   CALCIUM 9.0   MG 1.8     CBC:   Recent Labs   Lab 10/03/23  0439   WBC 8.53   RBC 3.23*   HGB 8.7*   HCT 28.1*      MCV 87   MCH 26.9*   MCHC 31.0*     CMP:   Recent Labs   Lab 10/03/23  0439   GLU 39*   CALCIUM 9.0   ALBUMIN 2.3*   PROT 6.5      K 3.6   CO2 27      BUN 43*   CREATININE 2.4*   ALKPHOS 39*   ALT 12   AST 17   BILITOT 0.3     Coagulation: No results for input(s): "PT", "INR", "APTT" in the last 168 hours.  CRP: No results for input(s): "CRP" in the last 168 hours.  ESR: No results for input(s): "SEDRATE" in the last 168 hours.  Prealbumin: No results for input(s): "PREALBUMIN" in the last 48 hours.  Wound Cultures:   Recent Labs   Lab 05/16/23  1451   LABAERO PSEUDOMONAS AERUGINOSA  Few  *  KLEBSIELLA PNEUMONIAE  Few  Skin bossman also present  *  PROTEUS MIRABILIS  Many  *  KLEBSIELLA PNEUMONIAE  Many  *  PROTEUS PENNERI  Many  Skin bossman also present  *     Microbiology Results (last 7 days)       Procedure Component Value Units Date/Time    Gram stain [[id number]] Collected: 10/03/23 1216    Order Status: Sent Specimen: Wound from Foot, Left Updated: 10/03/23 1216    Culture, Anaerobe [[id number]] Collected: 10/03/23 1216    Order Status: Sent Specimen: Wound from Foot, Left Updated: 10/03/23 1216    Aerobic culture [[id number]] Collected: 10/03/23 1216    Order Status: Sent Specimen: Wound from Foot, Left Updated: 10/03/23 1216    Blood culture [[id number]] Collected: 10/02/23 1701    Order Status: Completed Specimen: Blood from Line, PICC Left Basilic Updated: 10/03/23 0915     Blood Culture, Routine Gram stain aer bottle: Gram negative rods      Gram stain jumaan bottle: Gram negative rods      Results called to and read back by:Alejandra Figueroa RN 10/03/2023  06:29    Rapid " Organism ID by PCR (from Blood culture) [[id number]]  (Abnormal) Collected: 10/02/23 1701    Order Status: Completed Updated: 10/03/23 0912     Enterococcus faecalis Not Detected     Enterococcus faecium Not Detected     Listeria monocytogenes Not Detected     Staphylococcus spp. Not Detected     Staphylococcus aureus Not Detected     Staphylococcus epidermidis Not Detected     Staphylococcus lugdunensis Not Detected     Streptococcus species Not Detected     Streptococcus agalactiae Not Detected     Streptococcus pneumoniae Not Detected     Streptococcus pyogenes Not Detected     Acinetobacter calcoaceticus/baumannii complex Detected     Bacteroides fragilis Not Detected     Enterobacterales Not Detected     Enterobacter cloacae complex Not Detected     Escherichia coli Not Detected     Klebsiella aerogenes Not Detected     Klebsiella oxytoca Not Detected     Klebsiella pneumoniae group Not Detected     Proteus Not Detected     Salmonella sp Not Detected     Serratia marcescens Not Detected     Haemophilus influenzae Not Detected     Neisseria meningtidis Not Detected     Pseudomonas aeruginosa Not Detected     Stenotrophomonas maltophilia Not Detected     Candida albicans Not Detected     Candida auris Not Detected     Candida glabrata Not Detected     Candida krusei Not Detected     Candida parapsilosis Not Detected     Candida tropicalis Not Detected     Cryptococcus neoformans/gattii Not Detected     CTX-M (ESBL ) Not Detected     IMP (Carbapenem resistant) Not Detected     KPC resistance gene (Carbapenem resistant) Not Detected     mcr-1  Test Not Applicable     mec A/C  Test Not Applicable     mec A/C and MREJ (MRSA) gene Test Not Applicable     NDM (Carbapenem resistant) Not Detected     OXA-48-like (Carbapenem resistant) Test Not Applicable     van A/B (VRE gene) Test Not Applicable     VIM (Carbapenem resistant) Not Detected    Blood culture [[id number]] Collected: 10/02/23 1701    Order Status:  Completed Specimen: Blood from Line, PICC Left Basilic Updated: 10/03/23 0630     Blood Culture, Routine Gram stain aer bottle: Gram negative rods      Gram stain jumana bottle: Gram negative rods      Results called to and read back by:Alejandra Figueroa RN 10/03/2023  06:29    Urine culture [[id number]] Collected: 10/02/23 1554    Order Status: No result Specimen: Urine Updated: 10/02/23 1738    Aerobic culture [[id number]]     Order Status: No result Specimen: Skin from Ankle, Right           Specimen (24h ago, onward)      None              Assessment/Plan:     ID  Chronic osteomyelitis of left foot  Bilateral foot wounds noted.  Wound seemed granular in nature compared to when patient was previously seen. Wbc=8.53.  ESR CRP and procalcitonin pending  - no surgical intervention indicated from Podiatry at this time.  Patient is nonambulatory and more granulation tissue was noted since patient was last seen.  Wound seems stable.  Recommend wound care and offloading  - x-rays bilateral tib-fib, left ankle, and right foot pending  - VAS Abis pending   - wound cultures taken from left heel wound.  Pending  - antibiotic plan per primary/ID  - recommend waffle air mattress and heel offloading boots  - dressings applied Podiatry today as follows:  To right foot Hydrofera blue and Mepilex border dressing, to left foot heel wound Hydrofera blue followed by Kerlix secured by tape.  And to left proximal posterior lower leg wound Hydrofera Blue secured by Mepilex border dressing  - nursing wound care routine to be ordered  - pain management per primary  - podiatry will follow         Thank you for your consult. I will follow-up with patient. Please contact us if you have any additional questions.    Herminia Lauren DPM PGY-2  Podiatric Medicine & Surgery  [facility]  Secure Chat Preferred  Mobile: [phone]  Pager: [phone]

## 2023-10-03 NOTE — ASSESSMENT & PLAN NOTE
Bilateral foot wounds noted.  Wound seemed granular in nature compared to when patient was previously seen. Wbc=8.53.  ESR CRP and procalcitonin pending  - no surgical intervention indicated from Podiatry at this time.  Patient is nonambulatory and more granulation tissue was noted since patient was last seen.  Wound seems stable.  Recommend wound care and offloading  - x-rays bilateral tib-fib, left ankle, and right foot pending  - VAS Abis pending   - wound cultures taken from left heel wound.  Pending  - antibiotic plan per primary/ID  - recommend waffle air mattress and heel offloading boots  - dressings applied Podiatry today as follows:  To right foot Hydrofera blue and Mepilex border dressing, to left foot heel wound Hydrofera blue followed by Kerlix secured by tape.  And to left proximal posterior lower leg wound Hydrofera Blue secured by Mepilex border dressing  - nursing wound care routine to be ordered  - pain management per primary  - podiatry will follow

## 2023-10-03 NOTE — SUBJECTIVE & OBJECTIVE
Past Medical History:   Diagnosis Date    Arthritis     legs    Diabetes mellitus     Diabetes mellitus, type 2     Hyperlipidemia     Hypertension     Osteomyelitis        Past Surgical History:   Procedure Laterality Date    ANGIOGRAPHY OF LOWER EXTREMITY N/A 2/3/2021    Procedure: Angiogram Extremity Bilateral;  Surgeon: Ernst Chacko MD;  Location: [facility] OR [address] [place] [address];  Service: Peripheral Vascular;  Laterality: N/A;  7.4 mintues fluoroscopy time  816.15 mGy  170.17 Gycm2    AORTOGRAPHY WITH EXTREMITY RUNOFF Bilateral 2/3/2021    Procedure: AORTOGRAM, WITH EXTREMITY RUNOFF;  Surgeon: Ernst Chacko MD;  Location: [facility] OR [address] [place] [address];  Service: Peripheral Vascular;  Laterality: Bilateral;    DEBRIDEMENT OF FOOT Left 2/23/2021    Procedure: DEBRIDEMENT, LEFT HEEL;  Surgeon: Mayra Schroeder DPM;  Location: [facility] OR [address] [place] [address];  Service: Podiatry;  Laterality: Left;    FOOT AMPUTATION  October 2010    left high midfoot amputation       Review of patient's allergies indicates:  No Known Allergies    Medications:  Medications Prior to Admission   Medication Sig    acetaminophen (TYLENOL) 325 MG tablet Take 650 mg by mouth every 6 (six) hours as needed for Temperature greater than.    acidophilus-pectin, citrus 100 million cell-10 mg Cap Take 1 capsule by mouth 2 (two) times a day.    apixaban (ELIQUIS) 5 mg Tab Take 1 tablet (5 mg total) by mouth 2 (two) times daily.    ascorbic acid, vitamin C, (VITAMIN C) 500 MG tablet Take 500 mg by mouth 2 (two) times daily.    B COMPLEX-VITAMIN B12 tablet Take 1 tablet by mouth once daily.    cefepime HCl (CEFEPIME 2 G/50 ML D5W, READY TO MIX,) Inject 2 g into the vein every 12 (twelve) hours as needed (for infection of the skin).    clopidogreL (PLAVIX) 75 mg tablet Take 1 tablet (75 mg total) by mouth once daily.    diphenhydrAMINE (BENADRYL) 25 mg capsule No directions listed on the patients medication list.    furosemide (LASIX) 40 MG tablet Take 40 mg by mouth once  "daily.    gabapentin (NEURONTIN) 100 MG capsule Take 2 capsules (200 mg total) by mouth 2 (two) times daily. (Patient taking differently: Take 300 mg by mouth once daily.)    glipiZIDE (GLUCOTROL) 10 MG tablet Take 20 mg by mouth 2 (two) times a day.    insulin aspart U-100 (NOVOLOG) 100 unit/mL (3 mL) InPn pen Inject 12 Units into the skin 3 (three) times daily with meals. (Patient taking differently: Inject 3 Units into the skin 3 (three) times daily with meals.)    isosorbide dinitrate (ISORDIL) 10 MG tablet Take 1 tablet (10 mg total) by mouth 3 (three) times daily.    LANTUS SOLOSTAR U-100 INSULIN glargine 100 units/mL SubQ pen Inject 45 Units into the skin every evening. (Patient taking differently: Inject 45 Units into the skin once daily.)    linezolid in dextrose 5% (LINEZOLID/PLACEBO) 600 mg/300 mL SolP IVPB Inject 600 mg into the vein once daily. For skin infection    losartan-hydrochlorothiazide 100-25 mg (HYZAAR) 100-25 mg per tablet Take 1 tablet by mouth once daily.    metFORMIN (GLUMETZA) 1000 MG (MOD) 24hr tablet Take 1,000 mg by mouth Daily.    metroNIDAZOLE (FLAGYL) 500 MG tablet Take 500 mg by mouth every 8 (eight) hours.    multivitamin (THERAGRAN) per tablet Take 1 tablet by mouth once daily.    oxyCODONE 5 mg TbOr Take 1 tablet by mouth every 6 (six) hours as needed (Pain (Max 5 daily)).    pantoprazole (PROTONIX) 40 MG tablet Take 40 mg by mouth once daily. Before breakfast    rosuvastatin (CRESTOR) 40 MG Tab Take 40 mg by mouth once daily.    sodium hypochlorite 0.5 % (DAKIN'S SOLUTION) external solution Apply topically once daily.    tamsulosin (FLOMAX) 0.4 mg Cap Take 0.4 mg by mouth once daily.    triamcinolone acetonide 0.025% (KENALOG) 0.025 % Oint Apply topically 2 (two) times daily as needed (to affected area).    BD ULTRA-FINE ADITYA PEN NEEDLE 32 gauge x 5/32" Ndle USE FOUR TIMES DAILY WITH MEALS AND NIGHTLY    blood sugar diagnostic (CONTOUR TEST STRIPS) Strp Inject 1 strip into the " "skin 2 (two) times daily before meals.    MICROLET LANCET Misc     pen needle, diabetic 32 gauge x 5/32" Ndle use as directed with insulin     Antibiotics (From admission, onward)      Start     Stop Route Frequency Ordered    10/03/23 1515  vancomycin 1,250 mg in dextrose 5 % (D5W) 250 mL IVPB (Vial-Mate)         -- IV Once 10/03/23 1513    10/03/23 0100  piperacillin-tazobactam (ZOSYN) 4.5 g in dextrose 5 % in water (D5W) 100 mL IVPB (MB+)         -- IV Every 8 hours (non-standard times) 10/02/23 1542    10/02/23 1648  vancomycin - pharmacy to dose  (vancomycin IVPB (PEDS and ADULTS))        See Hyperspace for full Linked Orders Report.    -- IV pharmacy to manage frequency 10/02/23 1549          Antifungals (From admission, onward)      None          Antivirals (From admission, onward)      None             Immunization History   Administered Date(s) Administered    Influenza - Trivalent (ADULT) 10/08/2020    PPD Test 12/09/2020, 03/02/2021, 06/21/2022    Pneumococcal Conjugate - 13 Valent 10/16/2018       Family History       Problem Relation (Age of Onset)    Cancer Brother    Diabetes Mother, Sister    Heart disease Mother    Stroke Sister          Social History     Socioeconomic History    Marital status: Single   Tobacco Use    Smoking status: Never    Smokeless tobacco: Never   Substance and Sexual Activity    Alcohol use: No     Comment: occassional    Drug use: No    Sexual activity: Not Currently   Social History Narrative    Not currently working; lives with family     Social Determinants of Health     Financial Resource Strain: Low Risk  (10/3/2023)    Overall Financial Resource Strain (CARDIA)     Difficulty of Paying Living Expenses: Not hard at all   Food Insecurity: No Food Insecurity (10/3/2023)    Hunger Vital Sign     Worried About Running Out of Food in the Last Year: Never true     Ran Out of Food in the Last Year: Never true   Transportation Needs: No Transportation Needs (10/3/2023)    " PRAPARE - Transportation     Lack of Transportation (Medical): No     Lack of Transportation (Non-Medical): No   Physical Activity: Unknown (10/3/2023)    Exercise Vital Sign     Days of Exercise per Week: Patient refused     Minutes of Exercise per Session: Patient refused   Stress: Stress Concern Present (5/23/2023)    [employer] - Occupational Stress Questionnaire     Feeling of Stress : Rather much   Social Connections: Moderately Isolated (10/3/2023)    Social Connection and Isolation Panel [NHANES]     Frequency of Communication with Friends and Family: More than three times a week     Frequency of Social Gatherings with Friends and Family: Patient refused     Attends [employer] Services: More than 4 times per year     Active Member of Clubs or Organizations: No     Attends Club or Organization Meetings: Never     Marital Status: Never    Housing Stability: Low Risk  (10/3/2023)    Housing Stability Vital Sign     Unable to Pay for Housing in the Last Year: No     Number of Places Lived in the Last Year: 1     Unstable Housing in the Last Year: No     Review of Systems   Constitutional:  Negative for chills, diaphoresis and fever.   HENT:  Negative for rhinorrhea and sore throat.    Respiratory:  Negative for cough and shortness of breath.    Cardiovascular:  Negative for chest pain and leg swelling.   Gastrointestinal:  Negative for abdominal pain, diarrhea, nausea and vomiting.   Genitourinary:  Negative for dysuria and hematuria.   Musculoskeletal:  Negative for arthralgias and myalgias.   Skin:  Negative for rash.   Neurological:  Negative for headaches.     Objective:     Vital Signs (Most Recent):  Temp: 98.1 °F (36.7 °C) (10/03/23 1505)  Pulse: 82 (10/03/23 1702)  Resp: (!) 23 (10/03/23 1702)  BP: (!) 176/67 (10/03/23 1702)  SpO2: 95 % (10/03/23 1702) Vital Signs (24h Range):  Temp:  [97.6 °F (36.4 °C)-98.5 °F (36.9 °C)] 98.1 °F (36.7 °C)  Pulse:  [] 82  Resp:   [17-24] 23  SpO2:  [94 %-100 %] 95 %  BP: (136-218)/() 176/67     Weight: 112.5 kg (248 lb)  Body mass index is 36.62 kg/m².    Estimated Creatinine Clearance: 33.4 mL/min (A) (based on SCr of 2.4 mg/dL (H)).     Physical Exam  Vitals reviewed.   Constitutional:       General: He is not in acute distress.     Appearance: He is well-developed. He is not diaphoretic.   HENT:      Head: Normocephalic and atraumatic.      Nose: Nose normal.   Eyes:      Conjunctiva/sclera: Conjunctivae normal.   Pulmonary:      Effort: Pulmonary effort is normal. No respiratory distress.   Abdominal:      General: Abdomen is flat. There is no distension.   Musculoskeletal:      Cervical back: Normal range of motion.      Right lower leg: No edema.      Left lower leg: No edema.   Skin:     General: Skin is warm and dry.      Findings: No erythema or rash.      Comments: Right anterior foot wound. Left lower extremity dressed. Bilateral legs with skin thickening. Left arm with picc line, soiled dressing   Neurological:      Mental Status: He is alert.   Psychiatric:         Behavior: Behavior normal.          Significant Labs: All pertinent labs within the past 24 hours have been reviewed.    Significant Imaging: I have reviewed all pertinent imaging results/findings within the past 24 hours.

## 2023-10-03 NOTE — PROGRESS NOTES
[facility]  Cardiology  Progress Note    Patient Name: Saji Castañeda  MRN: [id number]  Admission Date: 10/2/2023  Hospital Length of Stay: 1 days  Code Status: Full Code   Attending Physician: Blanca Marin MD   Primary Care Physician: [facility] -  Expected Discharge Date: 10/10/2023  Principal Problem:Complete heart block    Subjective:     Hospital Course:   Pt with osteomyelitis admitted for encephalopathy and weakness concerning for septic shock. EKG with bradycardia and complete heart block so admitted to CCU. Started on dobutamine for inotropic support in setting of hypertension. Pt started on Vanc and Zosyn. BCx +GNR bacteremia with PCR positive for Acinetobacter. Due to complaints of aphasia, CT head ordered which did not show acute stroke. MRI ordered to clinically follow-up. ID consulted as pt has been on Linezolid for osteomyelitis. PICC line to be removed and replaced. Podiatry following who recommended extremities XR, wound cultured.       Interval History: HR stable at 70s. Pt still with complaints of difficulty with word finding so MRI ordered. Podiatry following for wounds, pending MARIO and XR.     Review of Systems   Constitutional: Negative for chills and fever.   Eyes:  Negative for blurred vision and double vision.   Cardiovascular:  Negative for chest pain and syncope.   Musculoskeletal:  Positive for myalgias (bilateral lower extremity).   Gastrointestinal:  Negative for abdominal pain, melena and vomiting.   Genitourinary:  Negative for dysuria and hematuria.   Neurological:  Negative for dizziness.        (+) speech change   Psychiatric/Behavioral:  Negative for depression and hallucinations.      Objective:     Vital Signs (Most Recent):  Temp: 98.1 °F (36.7 °C) (10/03/23 1505)  Pulse: 70 (10/03/23 1600)  Resp: (!) 22 (10/03/23 1600)  BP: (!) 159/69 (10/03/23 1600)  SpO2: 95 % (10/03/23 1600) Vital Signs (24h Range):  Temp:  [97.6 °F (36.4 °C)-98.5  °F (36.9 °C)] 98.1 °F (36.7 °C)  Pulse:  [] 70  Resp:  [17-24] 22  SpO2:  [94 %-100 %] 95 %  BP: (136-218)/() 159/69     Weight: 112.5 kg (248 lb)  Body mass index is 36.62 kg/m².     SpO2: 95 %         Intake/Output Summary (Last 24 hours) at 10/3/2023 1655  Last data filed at 10/3/2023 1602  Gross per 24 hour   Intake 2391.93 ml   Output --   Net 2391.93 ml       Lines/Drains/Airways       Peripherally Inserted Central Catheter Line  Duration             PICC Double Lumen 10/02/23 0000 left basilic 1 day              Central Venous Catheter Line  Duration                  Percutaneous Central Line Insertion/Assessment - Cordis 10/02/23 1650 Internal Jugular Right 1 day              Drain  Duration             Male External Urinary Catheter 10/03/23 0844 Small <1 day                       Physical Exam  Constitutional:       Appearance: He is obese. He is ill-appearing (chronically).   HENT:      Head: Normocephalic and atraumatic.      Nose: Nose normal. No congestion or rhinorrhea.      Mouth/Throat:      Mouth: Mucous membranes are moist.   Eyes:      General: No scleral icterus.        Right eye: No discharge.         Left eye: No discharge.      Extraocular Movements: Extraocular movements intact.   Cardiovascular:      Rate and Rhythm: Normal rate.      Heart sounds:      No friction rub.      Comments: 3rd degree heart block.   Pulmonary:      Effort: Pulmonary effort is normal. No respiratory distress.      Breath sounds: No stridor. No wheezing.   Abdominal:      General: Abdomen is flat. There is no distension.      Palpations: Abdomen is soft.   Musculoskeletal:      Right lower leg: Edema present.      Left lower leg: Edema present.   Skin:     General: Skin is warm and dry.      Comments: Bilateral venous stasis changes, flaky skin to bilateral lower extremities. S/p partial foot L appears infected.    Neurological:      Mental Status: He is alert and oriented to person, place, and time.       Gait: Gait abnormal.      Comments: Mild expressive aphasia.    Psychiatric:         Mood and Affect: Mood normal.         Behavior: Behavior normal.            Significant Labs: Blood Culture:   Recent Labs   Lab 10/02/23  1701   LABBLOO Gram stain aer bottle: Gram negative rods  Gram stain jumana bottle: Gram negative rods  Results called to and read back by:Alejandra Figueroa RN 10/03/2023  06:29  Gram stain aer bottle: Gram negative rods  Gram stain jumana bottle: Gram negative rods  Results called to and read back by:Alejandra Figueroa RN 10/03/2023  06:29   , BMP:   Recent Labs   Lab 10/02/23  1213 10/03/23  0439   GLU 43* 39*    139   K 4.0 3.6    106   CO2 25 27   BUN 48* 43*   CREATININE 2.6* 2.4*   CALCIUM 9.0 9.0   MG 1.8 1.8   , CBC   Recent Labs   Lab 10/02/23  1213 10/03/23  0439   WBC 7.50 8.53   HGB 8.5* 8.7*   HCT 26.9* 28.1*    174   , and All pertinent lab results from the last 24 hours have been reviewed.    Significant Imaging: All pertinent imaging results from the last 24 hours have been reviewed.    Assessment and Plan:       * Complete heart block  Patient is a 73 year old male with PVD c/b chronic bilateral OM with left foot amputation, paroxysmal atrial fibrillation on Eliquis, T2DM c/b neuropathy, HTN, CKD, PVD, VTE of RUE in June 2022, and asymptomatic Mobitz Type 1 AV block.       Plan:  -continue dobutamine ftt  -EP consulted, plans for leadless PPM when infection resolved  -telemetry and electrolyte replacements  -treating severe sepsis     Sepsis  History of OM with Klebsiella and Pseudomonas with VRE in the past   Blood and wound cultures  Antibiotics  ID consult    Paroxysmal atrial fibrillation  ECGs with atrial fibrillation with slow VR in the past  On ELiquis for VTE of the RUE as well  CHADS2-VASc 4    Chronic osteomyelitis  Came from NH with IV linezolid, cefepime, and metronidazole  ID consultation for assistance in management    Nonhealing ulcer of right  lower leg with fat layer exposed  See PAD    Chronic deep vein thrombosis (DVT) of right upper extremity  Continue Eliquis 5 mg PO BID    Gram-negative sepsis  Chronic OM of L foot    Came from NH with IV linezolid, cefepime, and metronidazole for chronic osteo.    -podiatry following, appreciate recs  -ID consultation for assistance in management  -PICC line removal + replacement  -continue Vanc+Zosyn  -f/u wound culture, bcx    Peripheral arterial disease  - long standing wounds with skin graft to left TMA in [id number]; LE angio in 2014 with patent left CFA, SFA, PFA with 3V run off on LE angiogram by Vascular surgery  - CTA LE with run off 6/2022 with moderate to high grade disease of right popliteal with severely diseased AT, PT, peroneal and multiple occlusion of left popliteal with occluded PT and peroneal and short segment occlusion of AT- patient denies any previous intervention  - BLE arterial ultrasound 3/2023 with similar findings suggestive of bilateral popliteal and BTK disease; seen by Vascular surgery at [facility] with recommendation for amputation- patient declined  - transferred to [facility] 05/2023 for evaluation for revascularization; extensive disease and multiple co morbidities along with debility/bed bound status and concern for non compliance, deemed not a  Revascularization candidate and placed on statin and Plavix; no ASA given risk of bleeding with triple therapy    PLAN:  -continue plavix, statin  -podiatry and vascular surgery following   -pending ABIs      Insulin dependent type 2 diabetes mellitus  Insulin at NH, persistent hypoglycemia now rebounded back to hyperglycemia    -LDSSI due to renal dysfunction  -diabetic diet, dysphagia        VTE Risk Mitigation (From admission, onward)         Ordered     apixaban tablet 5 mg  2 times daily         10/02/23 1544     IP VTE HIGH RISK PATIENT  Once         10/02/23 1513     Place sequential compression device  Until discontinued          10/02/23 1513                Meli Escobedo MD  Cardiology  [facility]

## 2023-10-03 NOTE — ASSESSMENT & PLAN NOTE
- long standing wounds with skin graft to left TMA in [id number]; LE angio in 2014 with patent left CFA, SFA, PFA with 3V run off on LE angiogram by Vascular surgery  - CTA LE with run off 6/2022 with moderate to high grade disease of right popliteal with severely diseased AT, PT, peroneal and multiple occlusion of left popliteal with occluded PT and peroneal and short segment occlusion of AT- patient denies any previous intervention  - BLE arterial ultrasound 3/2023 with similar findings suggestive of bilateral popliteal and BTK disease; seen by V[facility] surgery at [facility] with recommendation for amputation- patient declined  - transferred to [facility] 05/2023 for evaluation for revascularization; extensive disease and multiple co morbidities along with debility/bed bound status and concern for non compliance, deemed not a  Revascularization candidate and placed on statin and Plavix; no ASA given risk of bleeding with triple therapy    PLAN:  -continue plavix, statin  -podiatry and vascular surgery following   -pending ABIs

## 2023-10-03 NOTE — SUBJECTIVE & OBJECTIVE
Scheduled Meds:   apixaban  5 mg Oral BID    atorvastatin  80 mg Oral QHS    clopidogreL  75 mg Oral Daily    hydrALAZINE  50 mg Oral Q8H    piperacillin-tazobactam (Zosyn) IV (PEDS and ADULTS) (extended infusion is not appropriate)  4.5 g Intravenous Q8H     Continuous Infusions:   dextrose 10 % in water (D10W) 100 mL/hr at 10/03/23 0918    DOBUTamine IV infusion (non-titrating) 2.5 mcg/kg/min (10/03/23 0918)     PRN Meds:sodium chloride 0.9%, Pharmacy to dose Vancomycin consult **AND** vancomycin - pharmacy to dose    Review of patient's allergies indicates:  No Known Allergies     Past Medical History:   Diagnosis Date    Arthritis     legs    Diabetes mellitus     Diabetes mellitus, type 2     Hyperlipidemia     Hypertension     Osteomyelitis      Past Surgical History:   Procedure Laterality Date    ANGIOGRAPHY OF LOWER EXTREMITY N/A 2/3/2021    Procedure: Angiogram Extremity Bilateral;  Surgeon: Ernst Chacko MD;  Location: [facility] OR [address] [place] [address];  Service: Peripheral Vascular;  Laterality: N/A;  7.4 mintues fluoroscopy time  816.15 mGy  170.17 Gycm2    AORTOGRAPHY WITH EXTREMITY RUNOFF Bilateral 2/3/2021    Procedure: AORTOGRAM, WITH EXTREMITY RUNOFF;  Surgeon: Ernst Chacko MD;  Location: [facility] OR [address] [place] [address];  Service: Peripheral Vascular;  Laterality: Bilateral;    DEBRIDEMENT OF FOOT Left 2/23/2021    Procedure: DEBRIDEMENT, LEFT HEEL;  Surgeon: Mayra Schroeder DPM;  Location: [facility] OR [address] [place] [address];  Service: Podiatry;  Laterality: Left;    FOOT AMPUTATION  October 2010    left high midfoot amputation       Family History       Problem Relation (Age of Onset)    Cancer Brother    Diabetes Mother, Sister    Heart disease Mother    Stroke Sister          Tobacco Use    Smoking status: Never    Smokeless tobacco: Never   Substance and Sexual Activity    Alcohol use: No     Comment: occassional    Drug use: No    Sexual activity: Not Currently     Review of Systems   Unable to perform ROS: Mental status  change     Objective:     Vital Signs (Most Recent):  Temp: 98.1 °F (36.7 °C) (10/03/23 0805)  Pulse: 61 (10/03/23 0905)  Resp: (!) 22 (10/03/23 0805)  BP: (!) 164/70 (10/03/23 0905)  SpO2: 95 % (10/03/23 1006) Vital Signs (24h Range):  Temp:  [97.6 °F (36.4 °C)-98.5 °F (36.9 °C)] 98.1 °F (36.7 °C)  Pulse:  [] 61  Resp:  [17-24] 22  SpO2:  [94 %-100 %] 95 %  BP: (156-218)/(67-90) 164/70     Weight: 112.5 kg (248 lb)  Body mass index is 36.62 kg/m².    Foot Exam    General  Orientation: unable to assess       Right Foot/Ankle     Inspection and Palpation  Skin Exam: dry skin, fissure, skin changes, abnormal color and ulcer;     Neurovascular  Dorsalis pedis: absent  Posterior tibial: absent  Saphenous nerve sensation: diminished  Tibial nerve sensation: diminished  Superficial peroneal nerve sensation: diminished  Deep peroneal nerve sensation: diminished  Sural nerve sensation: diminished    Comments  Right anterior ankle superficial wound.  Wound is granular in nature.  Diffuse xerosis noted in are round the periwound area.  No erythema noted.  Swelling noted.  No probe to bone noted.  No fluctuance crepitus or purulence noted.  Mildly tender to touch.  Mild malodor noted    Left Foot/Ankle      Inspection and Palpation  Skin Exam: dry skin, skin changes and ulcer;     Neurovascular  Dorsalis pedis: absent  Posterior tibial: absent  Saphenous nerve sensation: diminished  Tibial nerve sensation: diminished  Superficial peroneal nerve sensation: diminished  Deep peroneal nerve sensation: diminished  Sural nerve sensation: diminished    Comments  Full-thickness Heel wound that was present on admission noted that is mostly granular in nature.  Slight fibrotic edges.  No probe to bone noted.  No fluctuance crepitus or purulence noted.  Periwound area is quite tender to touch.  No periwound erythema noted.  Mild swelling noted.  Mild malodor noted     Posterior proximal wound is mostly granular in nature.  No probe  "to bone noted.  No fluctuance crepitus or purulence noted.  Tender to touch.  No periwound erythema noted                  Laboratory:  A1C:   Recent Labs   Lab 09/13/23  0616   HGBA1C 11.0*     Blood Cultures:   Recent Labs   Lab 10/02/23  1701   LABBLOO Gram stain aer bottle: Gram negative rods  Gram stain jumana bottle: Gram negative rods  Results called to and read back by:Alejandra Figueroa RN 10/03/2023  06:29  Gram stain aer bottle: Gram negative rods  Gram stain jumana bottle: Gram negative rods  Results called to and read back by:Alejandra Figueroa RN 10/03/2023  06:29     BMP:   Recent Labs   Lab 10/03/23  0439   GLU 39*      K 3.6      CO2 27   BUN 43*   CREATININE 2.4*   CALCIUM 9.0   MG 1.8     CBC:   Recent Labs   Lab 10/03/23  0439   WBC 8.53   RBC 3.23*   HGB 8.7*   HCT 28.1*      MCV 87   MCH 26.9*   MCHC 31.0*     CMP:   Recent Labs   Lab 10/03/23  0439   GLU 39*   CALCIUM 9.0   ALBUMIN 2.3*   PROT 6.5      K 3.6   CO2 27      BUN 43*   CREATININE 2.4*   ALKPHOS 39*   ALT 12   AST 17   BILITOT 0.3     Coagulation: No results for input(s): "PT", "INR", "APTT" in the last 168 hours.  CRP: No results for input(s): "CRP" in the last 168 hours.  ESR: No results for input(s): "SEDRATE" in the last 168 hours.  Prealbumin: No results for input(s): "PREALBUMIN" in the last 48 hours.  Wound Cultures:   Recent Labs   Lab 05/16/23  1451   LABAERO PSEUDOMONAS AERUGINOSA  Few  *  KLEBSIELLA PNEUMONIAE  Few  Skin bossman also present  *  PROTEUS MIRABILIS  Many  *  KLEBSIELLA PNEUMONIAE  Many  *  PROTEUS PENNERI  Many  Skin bossman also present  *     Microbiology Results (last 7 days)       Procedure Component Value Units Date/Time    Gram stain [[id number]] Collected: 10/03/23 1216    Order Status: Sent Specimen: Wound from Foot, Left Updated: 10/03/23 1216    Culture, Anaerobe [[id number]] Collected: 10/03/23 1216    Order Status: Sent Specimen: Wound from Foot, Left Updated: 10/03/23 " 1216    Aerobic culture [[id number]] Collected: 10/03/23 1216    Order Status: Sent Specimen: Wound from Foot, Left Updated: 10/03/23 1216    Blood culture [[id number]] Collected: 10/02/23 1701    Order Status: Completed Specimen: Blood from Line, PICC Left Basilic Updated: 10/03/23 0915     Blood Culture, Routine Gram stain aer bottle: Gram negative rods      Gram stain jumana bottle: Gram negative rods      Results called to and read back by:Alejandra Figueroa RN 10/03/2023  06:29    Rapid Organism ID by PCR (from Blood culture) [[id number]]  (Abnormal) Collected: 10/02/23 1701    Order Status: Completed Updated: 10/03/23 0912     Enterococcus faecalis Not Detected     Enterococcus faecium Not Detected     Listeria monocytogenes Not Detected     Staphylococcus spp. Not Detected     Staphylococcus aureus Not Detected     Staphylococcus epidermidis Not Detected     Staphylococcus lugdunensis Not Detected     Streptococcus species Not Detected     Streptococcus agalactiae Not Detected     Streptococcus pneumoniae Not Detected     Streptococcus pyogenes Not Detected     Acinetobacter calcoaceticus/baumannii complex Detected     Bacteroides fragilis Not Detected     Enterobacterales Not Detected     Enterobacter cloacae complex Not Detected     Escherichia coli Not Detected     Klebsiella aerogenes Not Detected     Klebsiella oxytoca Not Detected     Klebsiella pneumoniae group Not Detected     Proteus Not Detected     Salmonella sp Not Detected     Serratia marcescens Not Detected     Haemophilus influenzae Not Detected     Neisseria meningtidis Not Detected     Pseudomonas aeruginosa Not Detected     Stenotrophomonas maltophilia Not Detected     Candida albicans Not Detected     Candida auris Not Detected     Candida glabrata Not Detected     Candida krusei Not Detected     Candida parapsilosis Not Detected     Candida tropicalis Not Detected     Cryptococcus neoformans/gattii Not Detected     CTX-M (ESBL ) Not  Detected     IMP (Carbapenem resistant) Not Detected     KPC resistance gene (Carbapenem resistant) Not Detected     mcr-1  Test Not Applicable     mec A/C  Test Not Applicable     mec A/C and MREJ (MRSA) gene Test Not Applicable     NDM (Carbapenem resistant) Not Detected     OXA-48-like (Carbapenem resistant) Test Not Applicable     van A/B (VRE gene) Test Not Applicable     VIM (Carbapenem resistant) Not Detected    Blood culture [[id number]] Collected: 10/02/23 1701    Order Status: Completed Specimen: Blood from Line, PICC Left Basilic Updated: 10/03/23 0630     Blood Culture, Routine Gram stain aer bottle: Gram negative rods      Gram stain jumana bottle: Gram negative rods      Results called to and read back by:Alejandra Figueroa RN 10/03/2023  06:29    Urine culture [[id number]] Collected: 10/02/23 1554    Order Status: No result Specimen: Urine Updated: 10/02/23 1738    Aerobic culture [[id number]]     Order Status: No result Specimen: Skin from Ankle, Right           Specimen (24h ago, onward)      None

## 2023-10-04 PROBLEM — Z51.5 PALLIATIVE CARE ENCOUNTER: Status: RESOLVED | Noted: 2023-05-24 | Resolved: 2023-10-04

## 2023-10-04 PROBLEM — N18.9 ACUTE ON CHRONIC RENAL FAILURE: Status: ACTIVE | Noted: 2018-10-15

## 2023-10-04 PROBLEM — R78.81 BACTEREMIA DUE TO GRAM-NEGATIVE BACTERIA: Status: ACTIVE | Noted: 2021-03-23

## 2023-10-04 PROBLEM — Z86.79 HISTORY OF COMPLETE HEART BLOCK: Status: ACTIVE | Noted: 2023-10-02

## 2023-10-04 LAB
ALBUMIN SERPL BCP-MCNC: 2.2 G/DL (ref 3.5–5.2)
ALBUMIN/CREAT UR: 262.2 UG/MG (ref 0–30)
ALP SERPL-CCNC: 42 U/L (ref 55–135)
ALT SERPL W/O P-5'-P-CCNC: 11 U/L (ref 10–44)
AMORPH CRY UR QL COMP ASSIST: ABNORMAL
ANION GAP SERPL CALC-SCNC: 9 MMOL/L (ref 8–16)
AST SERPL-CCNC: 14 U/L (ref 10–40)
BACTERIA #/AREA URNS AUTO: ABNORMAL /HPF
BASOPHILS # BLD AUTO: 0.03 K/UL (ref 0–0.2)
BASOPHILS NFR BLD: 0.4 % (ref 0–1.9)
BILIRUB SERPL-MCNC: 0.3 MG/DL (ref 0.1–1)
BILIRUB UR QL STRIP: NEGATIVE
BUN SERPL-MCNC: 46 MG/DL (ref 8–23)
CALCIUM SERPL-MCNC: 8.5 MG/DL (ref 8.7–10.5)
CHLORIDE SERPL-SCNC: 102 MMOL/L (ref 95–110)
CLARITY UR REFRACT.AUTO: ABNORMAL
CO2 SERPL-SCNC: 25 MMOL/L (ref 23–29)
COLOR UR AUTO: YELLOW
CREAT SERPL-MCNC: 3.5 MG/DL (ref 0.5–1.4)
CREAT UR-MCNC: 82 MG/DL (ref 23–375)
CREAT UR-MCNC: 82 MG/DL (ref 23–375)
DIFFERENTIAL METHOD: ABNORMAL
EOSINOPHIL # BLD AUTO: 1 K/UL (ref 0–0.5)
EOSINOPHIL NFR BLD: 11.6 % (ref 0–8)
ERYTHROCYTE [DISTWIDTH] IN BLOOD BY AUTOMATED COUNT: 17.1 % (ref 11.5–14.5)
EST. GFR  (NO RACE VARIABLE): 17.6 ML/MIN/1.73 M^2
GLUCOSE SERPL-MCNC: 373 MG/DL (ref 70–110)
GLUCOSE UR QL STRIP: ABNORMAL
HCT VFR BLD AUTO: 25.7 % (ref 40–54)
HGB BLD-MCNC: 8.4 G/DL (ref 14–18)
HGB UR QL STRIP: ABNORMAL
HYALINE CASTS UR QL AUTO: 0 /LPF
IMM GRANULOCYTES # BLD AUTO: 0.01 K/UL (ref 0–0.04)
IMM GRANULOCYTES NFR BLD AUTO: 0.1 % (ref 0–0.5)
KETONES UR QL STRIP: NEGATIVE
LEUKOCYTE ESTERASE UR QL STRIP: ABNORMAL
LYMPHOCYTES # BLD AUTO: 0.9 K/UL (ref 1–4.8)
LYMPHOCYTES NFR BLD: 10.5 % (ref 18–48)
MAGNESIUM SERPL-MCNC: 1.8 MG/DL (ref 1.6–2.6)
MCH RBC QN AUTO: 27.5 PG (ref 27–31)
MCHC RBC AUTO-ENTMCNC: 32.7 G/DL (ref 32–36)
MCV RBC AUTO: 84 FL (ref 82–98)
MICROALBUMIN UR DL<=1MG/L-MCNC: 215 UG/ML
MICROSCOPIC COMMENT: ABNORMAL
MONOCYTES # BLD AUTO: 0.9 K/UL (ref 0.3–1)
MONOCYTES NFR BLD: 11.3 % (ref 4–15)
NEUTROPHILS # BLD AUTO: 5.5 K/UL (ref 1.8–7.7)
NEUTROPHILS NFR BLD: 66.1 % (ref 38–73)
NITRITE UR QL STRIP: NEGATIVE
NRBC BLD-RTO: 0 /100 WBC
PH UR STRIP: 5 [PH] (ref 5–8)
PLATELET # BLD AUTO: 158 K/UL (ref 150–450)
PMV BLD AUTO: 10 FL (ref 9.2–12.9)
POCT GLUCOSE: 231 MG/DL (ref 70–110)
POCT GLUCOSE: 247 MG/DL (ref 70–110)
POCT GLUCOSE: 249 MG/DL (ref 70–110)
POCT GLUCOSE: 305 MG/DL (ref 70–110)
POTASSIUM SERPL-SCNC: 3.9 MMOL/L (ref 3.5–5.1)
PROT SERPL-MCNC: 6.2 G/DL (ref 6–8.4)
PROT UR QL STRIP: ABNORMAL
RBC # BLD AUTO: 3.05 M/UL (ref 4.6–6.2)
RBC #/AREA URNS AUTO: 17 /HPF (ref 0–4)
SODIUM SERPL-SCNC: 136 MMOL/L (ref 136–145)
SP GR UR STRIP: 1.01 (ref 1–1.03)
SQUAMOUS #/AREA URNS AUTO: 1 /HPF
URN SPEC COLLECT METH UR: ABNORMAL
VANCOMYCIN SERPL-MCNC: 27.8 UG/ML
WBC # BLD AUTO: 8.3 K/UL (ref 3.9–12.7)
WBC #/AREA URNS AUTO: 100 /HPF (ref 0–5)
WBC CLUMPS UR QL AUTO: ABNORMAL
YEAST UR QL AUTO: ABNORMAL

## 2023-10-04 PROCEDURE — 80202 ASSAY OF VANCOMYCIN: CPT | Performed by: INTERNAL MEDICINE

## 2023-10-04 PROCEDURE — 12000002 HC ACUTE/MED SURGE SEMI-PRIVATE ROOM

## 2023-10-04 PROCEDURE — 85025 COMPLETE CBC W/AUTO DIFF WBC: CPT | Performed by: INTERNAL MEDICINE

## 2023-10-04 PROCEDURE — 99232 PR SUBSEQUENT HOSPITAL CARE,LEVL II: ICD-10-PCS | Mod: ,,, | Performed by: INTERNAL MEDICINE

## 2023-10-04 PROCEDURE — 25000003 PHARM REV CODE 250: Performed by: INTERNAL MEDICINE

## 2023-10-04 PROCEDURE — 83735 ASSAY OF MAGNESIUM: CPT | Performed by: INTERNAL MEDICINE

## 2023-10-04 PROCEDURE — 63600175 PHARM REV CODE 636 W HCPCS

## 2023-10-04 PROCEDURE — 81001 URINALYSIS AUTO W/SCOPE: CPT

## 2023-10-04 PROCEDURE — 93010 ELECTROCARDIOGRAM REPORT: CPT | Mod: ,,, | Performed by: INTERNAL MEDICINE

## 2023-10-04 PROCEDURE — 25000003 PHARM REV CODE 250

## 2023-10-04 PROCEDURE — 25000003 PHARM REV CODE 250: Performed by: STUDENT IN AN ORGANIZED HEALTH CARE EDUCATION/TRAINING PROGRAM

## 2023-10-04 PROCEDURE — 80053 COMPREHEN METABOLIC PANEL: CPT | Performed by: INTERNAL MEDICINE

## 2023-10-04 PROCEDURE — 99231 SBSQ HOSP IP/OBS SF/LOW 25: CPT | Mod: GC,,, | Performed by: INTERNAL MEDICINE

## 2023-10-04 PROCEDURE — 93005 ELECTROCARDIOGRAM TRACING: CPT

## 2023-10-04 PROCEDURE — 87086 URINE CULTURE/COLONY COUNT: CPT

## 2023-10-04 PROCEDURE — 99232 SBSQ HOSP IP/OBS MODERATE 35: CPT | Mod: ,,, | Performed by: INTERNAL MEDICINE

## 2023-10-04 PROCEDURE — 63600175 PHARM REV CODE 636 W HCPCS: Performed by: STUDENT IN AN ORGANIZED HEALTH CARE EDUCATION/TRAINING PROGRAM

## 2023-10-04 PROCEDURE — 93010 EKG 12-LEAD: ICD-10-PCS | Mod: ,,, | Performed by: INTERNAL MEDICINE

## 2023-10-04 PROCEDURE — 93922 UPR/L XTREMITY ART 2 LEVELS: CPT | Performed by: SURGERY

## 2023-10-04 PROCEDURE — 99231 PR SUBSEQUENT HOSPITAL CARE,LEVL I: ICD-10-PCS | Mod: GC,,, | Performed by: INTERNAL MEDICINE

## 2023-10-04 PROCEDURE — 82570 ASSAY OF URINE CREATININE: CPT

## 2023-10-04 RX ORDER — HYDRALAZINE HYDROCHLORIDE 50 MG/1
100 TABLET, FILM COATED ORAL EVERY 8 HOURS
Status: DISCONTINUED | OUTPATIENT
Start: 2023-10-04 | End: 2023-10-19 | Stop reason: HOSPADM

## 2023-10-04 RX ORDER — PANTOPRAZOLE SODIUM 40 MG/1
40 TABLET, DELAYED RELEASE ORAL DAILY
Status: DISCONTINUED | OUTPATIENT
Start: 2023-10-04 | End: 2023-10-19 | Stop reason: HOSPADM

## 2023-10-04 RX ORDER — INSULIN ASPART 100 [IU]/ML
3 INJECTION, SOLUTION INTRAVENOUS; SUBCUTANEOUS
Status: DISCONTINUED | OUTPATIENT
Start: 2023-10-04 | End: 2023-10-05

## 2023-10-04 RX ORDER — MICONAZOLE NITRATE 2 %
POWDER (GRAM) TOPICAL 2 TIMES DAILY
Status: DISCONTINUED | OUTPATIENT
Start: 2023-10-04 | End: 2023-10-19 | Stop reason: HOSPADM

## 2023-10-04 RX ORDER — POTASSIUM CHLORIDE 20 MEQ/1
20 TABLET, EXTENDED RELEASE ORAL ONCE
Status: COMPLETED | OUTPATIENT
Start: 2023-10-04 | End: 2023-10-04

## 2023-10-04 RX ORDER — HYDROMORPHONE HYDROCHLORIDE 1 MG/ML
0.5 INJECTION, SOLUTION INTRAMUSCULAR; INTRAVENOUS; SUBCUTANEOUS EVERY 6 HOURS PRN
Status: DISCONTINUED | OUTPATIENT
Start: 2023-10-04 | End: 2023-10-19 | Stop reason: HOSPADM

## 2023-10-04 RX ORDER — MAGNESIUM SULFATE HEPTAHYDRATE 40 MG/ML
2 INJECTION, SOLUTION INTRAVENOUS ONCE
Status: COMPLETED | OUTPATIENT
Start: 2023-10-04 | End: 2023-10-04

## 2023-10-04 RX ORDER — GABAPENTIN 100 MG/1
100 CAPSULE ORAL 3 TIMES DAILY
Status: DISCONTINUED | OUTPATIENT
Start: 2023-10-04 | End: 2023-10-08

## 2023-10-04 RX ORDER — ASCORBIC ACID 500 MG
500 TABLET ORAL 2 TIMES DAILY
Status: DISCONTINUED | OUTPATIENT
Start: 2023-10-04 | End: 2023-10-19 | Stop reason: HOSPADM

## 2023-10-04 RX ORDER — TAMSULOSIN HYDROCHLORIDE 0.4 MG/1
0.4 CAPSULE ORAL DAILY
Status: DISCONTINUED | OUTPATIENT
Start: 2023-10-04 | End: 2023-10-19 | Stop reason: HOSPADM

## 2023-10-04 RX ORDER — ACETAMINOPHEN 325 MG/1
650 TABLET ORAL EVERY 6 HOURS
Status: DISCONTINUED | OUTPATIENT
Start: 2023-10-04 | End: 2023-10-19 | Stop reason: HOSPADM

## 2023-10-04 RX ADMIN — GABAPENTIN 100 MG: 100 CAPSULE ORAL at 10:10

## 2023-10-04 RX ADMIN — PANTOPRAZOLE SODIUM 40 MG: 40 TABLET, DELAYED RELEASE ORAL at 10:10

## 2023-10-04 RX ADMIN — GABAPENTIN 100 MG: 100 CAPSULE ORAL at 02:10

## 2023-10-04 RX ADMIN — OXYCODONE HYDROCHLORIDE AND ACETAMINOPHEN 500 MG: 500 TABLET ORAL at 10:10

## 2023-10-04 RX ADMIN — INSULIN ASPART 1 UNITS: 100 INJECTION, SOLUTION INTRAVENOUS; SUBCUTANEOUS at 10:10

## 2023-10-04 RX ADMIN — PIPERACILLIN AND TAZOBACTAM 4.5 G: 4; .5 INJECTION, POWDER, LYOPHILIZED, FOR SOLUTION INTRAVENOUS; PARENTERAL at 05:10

## 2023-10-04 RX ADMIN — PIPERACILLIN AND TAZOBACTAM 4.5 G: 4; .5 INJECTION, POWDER, LYOPHILIZED, FOR SOLUTION INTRAVENOUS; PARENTERAL at 12:10

## 2023-10-04 RX ADMIN — OXYCODONE HYDROCHLORIDE AND ACETAMINOPHEN 500 MG: 500 TABLET ORAL at 09:10

## 2023-10-04 RX ADMIN — INSULIN ASPART 3 UNITS: 100 INJECTION, SOLUTION INTRAVENOUS; SUBCUTANEOUS at 12:10

## 2023-10-04 RX ADMIN — HYDRALAZINE HYDROCHLORIDE 75 MG: 50 TABLET ORAL at 05:10

## 2023-10-04 RX ADMIN — APIXABAN 5 MG: 5 TABLET, FILM COATED ORAL at 09:10

## 2023-10-04 RX ADMIN — SODIUM CHLORIDE, POTASSIUM CHLORIDE, SODIUM LACTATE AND CALCIUM CHLORIDE 500 ML: 600; 310; 30; 20 INJECTION, SOLUTION INTRAVENOUS at 02:10

## 2023-10-04 RX ADMIN — PIPERACILLIN AND TAZOBACTAM 4.5 G: 4; .5 INJECTION, POWDER, LYOPHILIZED, FOR SOLUTION INTRAVENOUS; PARENTERAL at 09:10

## 2023-10-04 RX ADMIN — ACETAMINOPHEN 650 MG: 325 TABLET ORAL at 05:10

## 2023-10-04 RX ADMIN — INSULIN ASPART 2 UNITS: 100 INJECTION, SOLUTION INTRAVENOUS; SUBCUTANEOUS at 12:10

## 2023-10-04 RX ADMIN — POTASSIUM CHLORIDE 20 MEQ: 1500 TABLET, EXTENDED RELEASE ORAL at 03:10

## 2023-10-04 RX ADMIN — GABAPENTIN 100 MG: 100 CAPSULE ORAL at 09:10

## 2023-10-04 RX ADMIN — THERA TABS 1 TABLET: TAB at 10:10

## 2023-10-04 RX ADMIN — CLOPIDOGREL BISULFATE 75 MG: 75 TABLET ORAL at 09:10

## 2023-10-04 RX ADMIN — ATORVASTATIN CALCIUM 80 MG: 40 TABLET, FILM COATED ORAL at 09:10

## 2023-10-04 RX ADMIN — TAMSULOSIN HYDROCHLORIDE 0.4 MG: 0.4 CAPSULE ORAL at 10:10

## 2023-10-04 RX ADMIN — INSULIN ASPART 2 UNITS: 100 INJECTION, SOLUTION INTRAVENOUS; SUBCUTANEOUS at 04:10

## 2023-10-04 RX ADMIN — INSULIN ASPART 3 UNITS: 100 INJECTION, SOLUTION INTRAVENOUS; SUBCUTANEOUS at 04:10

## 2023-10-04 RX ADMIN — ACETAMINOPHEN 650 MG: 325 TABLET ORAL at 12:10

## 2023-10-04 RX ADMIN — HYDRALAZINE HYDROCHLORIDE 100 MG: 50 TABLET ORAL at 02:10

## 2023-10-04 RX ADMIN — MAGNESIUM SULFATE HEPTAHYDRATE 2 G: 40 INJECTION, SOLUTION INTRAVENOUS at 03:10

## 2023-10-04 RX ADMIN — INSULIN ASPART 4 UNITS: 100 INJECTION, SOLUTION INTRAVENOUS; SUBCUTANEOUS at 08:10

## 2023-10-04 NOTE — ASSESSMENT & PLAN NOTE
2nd degree AV block type 2    Patient is a 73 year old male with PVD c/b chronic bilateral OM with left foot amputation, paroxysmal atrial fibrillation on Eliquis, T2DM c/b neuropathy, HTN, CKD, PVD, VTE of RUE in June 2022, and asymptomatic Mobitz Type 1 AV block.       Plan:  -discontinue dobutamine, HR augementing appropriately with P waves transmitting  -If pt returns to complete heart block, he is a poor candidate for PPM due to high infection risk (chronic poor healing wounds)  -telemetry and electrolyte replacements  -treating severe sepsis

## 2023-10-04 NOTE — SUBJECTIVE & OBJECTIVE
Interval History:  No fevers overnight  Off dobutamine  No complaints      Review of Systems   Constitutional:  Negative for chills, diaphoresis and fever.   HENT:  Negative for rhinorrhea and sore throat.    Respiratory:  Negative for cough and shortness of breath.    Cardiovascular:  Negative for chest pain and leg swelling.   Gastrointestinal:  Negative for abdominal pain, diarrhea, nausea and vomiting.   Genitourinary:  Negative for dysuria and hematuria.   Musculoskeletal:  Negative for arthralgias and myalgias.   Skin:  Negative for rash.   Neurological:  Negative for headaches.     Objective:     Vital Signs (Most Recent):  Temp: 98.2 °F (36.8 °C) (10/04/23 1101)  Pulse: (!) 53 (10/04/23 1501)  Resp: (!) 29 (10/04/23 1501)  BP: (!) 143/65 (10/04/23 1501)  SpO2: 98 % (10/04/23 1501) Vital Signs (24h Range):  Temp:  [97.8 °F (36.6 °C)-98.2 °F (36.8 °C)] 98.2 °F (36.8 °C)  Pulse:  [] 53  Resp:  [15-35] 29  SpO2:  [95 %-99 %] 98 %  BP: (122-203)/() 143/65     Weight: 112.5 kg (248 lb)  Body mass index is 36.62 kg/m².    Estimated Creatinine Clearance: 22.9 mL/min (A) (based on SCr of 3.5 mg/dL (H)).     Physical Exam  Vitals reviewed.   Constitutional:       General: He is not in acute distress.     Appearance: He is well-developed. He is not diaphoretic.   HENT:      Head: Normocephalic and atraumatic.      Nose: Nose normal.   Eyes:      Conjunctiva/sclera: Conjunctivae normal.   Pulmonary:      Effort: Pulmonary effort is normal. No respiratory distress.   Abdominal:      General: Abdomen is flat. There is no distension.   Musculoskeletal:      Cervical back: Normal range of motion.      Right lower leg: No edema.      Left lower leg: No edema.   Skin:     General: Skin is warm and dry.      Findings: No erythema or rash.      Comments: Right anterior foot wound dressed. Left foot amptuation, posterior left leg stump dressed   Neurological:      Mental Status: He is alert.   Psychiatric:          Behavior: Behavior normal.          Significant Labs: All pertinent labs within the past 24 hours have been reviewed.    Significant Imaging: I have reviewed all pertinent imaging results/findings within the past 24 hours.

## 2023-10-04 NOTE — PROGRESS NOTES
[facility]  Infectious Disease  Progress Note    Patient Name: Saji Castañeda  MRN: [id number]  Admission Date: 10/2/2023  Length of Stay: 2 days  Attending Physician: Blanca Marin MD  Primary Care Provider: [facility] -    Isolation Status: No active isolations  Assessment/Plan:      ID  * Bacteremia due to Gram-negative bacteria  73-year-old male with history of afib, T2DM, PVD s/p left foot amputation, chronic osteomyelitis on IV antibiotics at nursing home, presented from nursing home with weakness, found to have Klebsiella and Acinetobacter bacteremia - likely source includes PICC line - also consider from left heel wound. PICC line removed 10/4/2023.    Recommendations:  - Discontinue vancomycin IV  - Continue pip-tazo  - Follow-up blood culture susceptibilities  - Follow-up wound cultures  - Repeat blood cultures x2 ordered for AM        35 minutes of total time spent on the encounter, which includes face to face time and non-face to face time preparing to see the patient (eg, review of tests), obtaining and reviewing separately obtained history, documenting clinical information in the electronic or other health record, independently interpreting results (not separately reported) and communicating results to the patient/family/caregiver, and care coordination (not separately reported).       Thank you for your consult. I will follow-up with patient. Please contact us if you have any additional questions.    Joellen Rachel MD  Infectious Disease  [facility]    Subjective:     Principal Problem:Bacteremia due to Gram-negative bacteria    HPI: 73-year-old male with history of afib, T2DM, PVD s/p left foot amputation, chronic osteomyelitis, presented from nursing home with weakness. Patient found to have mobitz type 1 AV block, admitted to CCU. Patient started on dobutamine IV. ID consulted for management of osteomyelitis of left foot. He was being  treated with linezolid, cefepime, metronidazole at his nursing home. He had a left PICC line. Patient found to have Acinetobacter baumanii bacteremia. Patient reported feeling better compared to admission, denied having any pain.    Interval History:  No fevers overnight  Off dobutamine  No complaints      Review of Systems   Constitutional:  Negative for chills, diaphoresis and fever.   HENT:  Negative for rhinorrhea and sore throat.    Respiratory:  Negative for cough and shortness of breath.    Cardiovascular:  Negative for chest pain and leg swelling.   Gastrointestinal:  Negative for abdominal pain, diarrhea, nausea and vomiting.   Genitourinary:  Negative for dysuria and hematuria.   Musculoskeletal:  Negative for arthralgias and myalgias.   Skin:  Negative for rash.   Neurological:  Negative for headaches.     Objective:     Vital Signs (Most Recent):  Temp: 98.2 °F (36.8 °C) (10/04/23 1101)  Pulse: (!) 53 (10/04/23 1501)  Resp: (!) 29 (10/04/23 1501)  BP: (!) 143/65 (10/04/23 1501)  SpO2: 98 % (10/04/23 1501) Vital Signs (24h Range):  Temp:  [97.8 °F (36.6 °C)-98.2 °F (36.8 °C)] 98.2 °F (36.8 °C)  Pulse:  [] 53  Resp:  [15-35] 29  SpO2:  [95 %-99 %] 98 %  BP: (122-203)/() 143/65     Weight: 112.5 kg (248 lb)  Body mass index is 36.62 kg/m².    Estimated Creatinine Clearance: 22.9 mL/min (A) (based on SCr of 3.5 mg/dL (H)).     Physical Exam  Vitals reviewed.   Constitutional:       General: He is not in acute distress.     Appearance: He is well-developed. He is not diaphoretic.   HENT:      Head: Normocephalic and atraumatic.      Nose: Nose normal.   Eyes:      Conjunctiva/sclera: Conjunctivae normal.   Pulmonary:      Effort: Pulmonary effort is normal. No respiratory distress.   Abdominal:      General: Abdomen is flat. There is no distension.   Musculoskeletal:      Cervical back: Normal range of motion.      Right lower leg: No edema.      Left lower leg: No edema.   Skin:     General: Skin  is warm and dry.      Findings: No erythema or rash.      Comments: Right anterior foot wound dressed. Left foot amptuation, posterior left leg stump dressed   Neurological:      Mental Status: He is alert.   Psychiatric:         Behavior: Behavior normal.          Significant Labs: All pertinent labs within the past 24 hours have been reviewed.    Significant Imaging: I have reviewed all pertinent imaging results/findings within the past 24 hours.     [id number]: || ||00\01||False;

## 2023-10-04 NOTE — ASSESSMENT & PLAN NOTE
- long standing wounds with skin graft to left TMA in [id number]; LE angio in 2014 with patent left CFA, SFA, PFA with 3V run off on LE angiogram by Vascular surgery  - CTA LE with run off 6/2022 with moderate to high grade disease of right popliteal with severely diseased AT, PT, peroneal and multiple occlusion of left popliteal with occluded PT and peroneal and short segment occlusion of AT- patient denies any previous intervention  - BLE arterial ultrasound 3/2023 with similar findings suggestive of bilateral popliteal and BTK disease; seen by V[facility] surgery [facility] with recommendation for amputation- patient declined  - transferred to [facility] 05/2023 for evaluation for revascularization; extensive disease and multiple co morbidities along with debility/bed bound status and concern for non compliance, deemed not a  Revascularization candidate and placed on statin and Plavix; no ASA given risk of bleeding with triple therapy    PLAN:  -continue plavix, statin  -vascular surgery recs for no surgical interventions at this time  -pending ABIs  -PT/OT

## 2023-10-04 NOTE — NURSING
Pt with change in rhythm per monitor from first degree heart block to accelerated junctional rhythm, RN assessed pt status, vss per monitor, pulses palpable, pt rouses to voice from sleep, denies pain, chest pain , sob, nausea, pt is afebrile, no physiological changes noted on assessment of pt.  RN called MD during assessment of pt to notify of change and RN performed EKG via monitor and sent to muse and printed, MD recommended no changes to current course of care.  MD stated that she will look at ekg and pt rhythm, RN notified ZONIA Mares RN regarding change, ZONIA Mares agreed and stated pt appeared to be in a junctional rhythm.  During these calls pt rate slowed from mid 120's bpm to 90's, then 70's, all without any complaint from pt or issue.

## 2023-10-04 NOTE — ASSESSMENT & PLAN NOTE
Klebsiella pneumoniae bacteremia  Actinobacter bacteremia  Chronic OM of L foot    Came from NH with IV linezolid, cefepime, and metronidazole for chronic osteo (did grow Klebsiella, pseudomonas).     -podiatry following with tests ordered, recommending wound care to assume at this time  -ID consultation for assistance in management  -PICC line removed 10/3, will monitor replacement lines  -continue Vanc+Zosyn  -f/u wound culture, bcx

## 2023-10-04 NOTE — PLAN OF CARE
ID  Chronic osteomyelitis of left foot  Bilateral foot wounds noted.  Wound seemed granular in nature compared to when patient was previously seen. Wbc=8.53.  ESR CRP and procalcitonin pending  - no surgical intervention indicated from Podiatry at this time.  Patient is nonambulatory and more granulation tissue was noted since patient was last seen.  Wound seems stable.  Recommend wound care and offloading as patient is nonambulatory  - x-rays bilateral tib-fib, left ankle, and right foot pending  - VAS Abis pending   - final wound cultures taken from left heel wound.  Pending  - antibiotic plan per ID  - recommend waffle air mattress and heel offloading boots  - dressings applied Podiatry today as follows:  To right foot Hydrofera blue and Mepilex border dressing, to left foot heel wound Hydrofera blue followed by Kerlix secured by tape.  And to left proximal posterior lower leg wound Hydrofera Blue secured by Mepilex border dressing  - nursing wound care routine ordered  - pain management per primary  - podiatry will sign off.  Thank you for allowing us in the care of this patient.  Please contact with any additional questions.       Discharge instructions:  - recommend patient follow up in [facility].  Please send ambulatory referral  - recommend waffle air mattress and heel offloading boots  - recommend dressing changes 3 times a week as follows by SNF: To right foot Hydrofera blue secured by cast padding and Ace wraps. and to left foot heel wound Hydrofera blue followed by cast padding and Ace wraps.  And to left proximal posterior lower leg wound Hydrofera Blue secured by Kerlix and Ace wraps  - antibiotic plan per ID    Herminia Lauren DPM PGY-2  Podiatric Medicine & Surgery  [facility]  Secure Chat Preferred  Mobile: [phone]  Pager: [phone]

## 2023-10-04 NOTE — SUBJECTIVE & OBJECTIVE
Interval History: HR stable off dobutamine, augments appropriately with exertion to 80s and 50s at rest.    Patient is complaining of leg pain near his infection sites but otherwise subjectively feels better today.     Review of Systems   Constitutional: Negative for chills and fever.   Eyes:  Negative for blurred vision and double vision.   Cardiovascular:  Negative for chest pain and syncope.   Musculoskeletal:  Positive for myalgias (bilateral lower extremity).   Gastrointestinal:  Negative for abdominal pain, melena and vomiting.   Genitourinary:  Negative for dysuria and hematuria.   Neurological:  Negative for dizziness.        (+) speech change   Psychiatric/Behavioral:  Negative for depression and hallucinations.      Objective:     Vital Signs (Most Recent):  Temp: 98.2 °F (36.8 °C) (10/04/23 1101)  Pulse: (!) 58 (10/04/23 1301)  Resp: 17 (10/04/23 1301)  BP: (!) 143/65 (10/04/23 1301)  SpO2: 97 % (10/04/23 1301) Vital Signs (24h Range):  Temp:  [97.8 °F (36.6 °C)-98.2 °F (36.8 °C)] 98.2 °F (36.8 °C)  Pulse:  [] 58  Resp:  [15-35] 17  SpO2:  [95 %-99 %] 97 %  BP: (122-203)/() 143/65     Weight: 112.5 kg (248 lb)  Body mass index is 36.62 kg/m².     SpO2: 97 %         Intake/Output Summary (Last 24 hours) at 10/4/2023 1350  Last data filed at 10/4/2023 1301  Gross per 24 hour   Intake 1828.36 ml   Output 250 ml   Net 1578.36 ml         Lines/Drains/Airways       Central Venous Catheter Line  Duration                  Percutaneous Central Line Insertion/Assessment - Cordis 10/02/23 1650 Internal Jugular Right 1 day              Drain  Duration             Male External Urinary Catheter 10/03/23 1958 Other (Comment) <1 day              Peripheral Intravenous Line  Duration                  Peripheral IV - Single Lumen 10/04/23 0935 20 G;1 3/4 in Left Forearm <1 day         Peripheral IV - Single Lumen 10/04/23 1000 20 G Anterior;Left Forearm <1 day                       Physical  Exam  Constitutional:       Appearance: He is obese. He is ill-appearing (chronically).   HENT:      Head: Normocephalic and atraumatic.      Nose: Nose normal. No congestion or rhinorrhea.      Mouth/Throat:      Mouth: Mucous membranes are moist.      Comments: Poor dentition.   Eyes:      General: No scleral icterus.        Right eye: No discharge.         Left eye: No discharge.      Extraocular Movements: Extraocular movements intact.   Neck:      Comments: Dallas cath in neck.   Cardiovascular:      Rate and Rhythm: Normal rate.      Heart sounds:      No friction rub.      Comments: Occassional PVCs.  Pulmonary:      Effort: Pulmonary effort is normal. No respiratory distress.      Breath sounds: No stridor. No wheezing.   Abdominal:      General: Abdomen is flat. There is no distension.      Palpations: Abdomen is soft.   Musculoskeletal:      Right lower leg: Edema present.      Left lower leg: Edema present.   Skin:     General: Skin is warm and dry.      Comments: Bilateral venous stasis changes, flaky skin to bilateral lower extremities. S/p partial foot amputation L appears infected, with granulation tissue.   Neurological:      Mental Status: He is alert and oriented to person, place, and time.      Gait: Gait abnormal.   Psychiatric:         Mood and Affect: Mood normal.         Behavior: Behavior normal.            Significant Labs: Blood Culture:   Recent Labs   Lab 10/02/23  1701   LABBLOO Gram stain aer bottle: Gram negative rods  Gram stain jumana bottle: Gram negative rods  Results called to and read back by:Alejandra Figueroa RN 10/03/2023  06:29  KLEBSIELLA PNEUMONIAE  Susceptibility pending  *  ACINETOBACTER SPECIES  further identified as Acinetobacter baumannii nosocomialis group  Susceptibility pending  *  Gram stain aer bottle: Gram negative rods  Gram stain jumana bottle: Gram negative rods  Results called to and read back by:Alejandra Figueroa RN 10/03/2023  06:29  GRAM NEGATIVE  TODD  Identification pending  For susceptibility see order #[id number]  *     , BMP:   Recent Labs   Lab 10/03/23  0439 10/04/23  0224   GLU 39* 373*    136   K 3.6 3.9    102   CO2 27 25   BUN 43* 46*   CREATININE 2.4* 3.5*   CALCIUM 9.0 8.5*   MG 1.8 1.8     , CBC   Recent Labs   Lab 10/03/23  0439 10/04/23  0224   WBC 8.53 8.30   HGB 8.7* 8.4*   HCT 28.1* 25.7*    158     , and All pertinent lab results from the last 24 hours have been reviewed.    Significant Imaging: All pertinent imaging results from the last 24 hours have been reviewed.

## 2023-10-04 NOTE — ASSESSMENT & PLAN NOTE
73-year-old male with history of afib, T2DM, PVD s/p left foot amputation, chronic osteomyelitis on IV antibiotics at nursing home, presented from nursing home with weakness, found to have Klebsiella and Acinetobacter bacteremia - likely source includes PICC line - also consider from left heel wound. PICC line removed 10/4/2023.    Recommendations:  - Discontinue vancomycin IV  - Continue pip-tazo  - Follow-up blood culture susceptibilities  - Follow-up wound cultures  - Repeat blood cultures x2 ordered for AM

## 2023-10-04 NOTE — CARE UPDATE
Patient with CHB and narrow escape with rate > 40 class IIA recommendation for PPM. He will need to be cleared by ID prior to implantation of PPM.

## 2023-10-04 NOTE — PT/OT/SLP PROGRESS
Speech Language Pathology      Saji Castañeda  MRN: [id number]    Patient not seen today secondary to nursing care. Will follow-up for speech/language assessment at a later date.      10/4/2023

## 2023-10-04 NOTE — ASSESSMENT & PLAN NOTE
Continue Eliquis 5 mg PO BID, if continues to have worsening renal dysfunction will switch to heparin gtt

## 2023-10-04 NOTE — ASSESSMENT & PLAN NOTE
Cr uptrending from baseline of 1.5 ->3.5.  Likely due to complex co-morbidities with current bacteremia.  Renal US without obstruction/hydronephrosis.  Microalbumin/Cr elevated.   UA +leukocytes+RBCs, UCx negative.    -BMP daily  -strict intake and output  -IVFs resuscitation

## 2023-10-04 NOTE — PROGRESS NOTES
Pharmacokinetic Assessment Follow Up: IV Vancomycin    Vancomycin serum concentration assessment(s):    The random level was an approximately a 10 hour post dose level. The measurement is above the desired definitive target range of 15 to 20 mcg/mL.  SCR increased from 2.4 to 3.5mcg/mL.     Vancomycin Regimen Plan:    Do not redose vancomycin today given YNES and supratherapeutic level.  Redose based on level and renal function.     Drug levels (last 3 results):  Recent Labs   Lab Result Units 10/03/23  1114 10/04/23  0224   Vancomycin, Random ug/mL 18.9 27.8       Pharmacy will continue to follow and monitor vancomycin.    Please contact pharmacy at extension [phone] for questions regarding this assessment.    Thank you for the consult,   Rosana Larkin, PharmD, BCPS, [facility] Clinical Pharmacy Specialist  EXT [phone]       Patient brief summary:  Saji Castañeda is a 73 y.o. male initiated on antimicrobial therapy with IV Vancomycin for treatment of bacteremia    The patient's current regimen is pulse dosing    Drug Allergies:   Review of patient's allergies indicates:  No Known Allergies    Actual Body Weight:   112.5kg    Renal Function:   Estimated Creatinine Clearance: 22.9 mL/min (A) (based on SCr of 3.5 mg/dL (H)).,     Dialysis Method (if applicable):  N/A    CBC (last 72 hours):  Recent Labs   Lab Result Units 10/02/23  1213 10/03/23  0439 10/04/23  0224   WBC K/uL 7.50 8.53 8.30   Hemoglobin g/dL 8.5* 8.7* 8.4*   Hematocrit % 26.9* 28.1* 25.7*   Platelets K/uL 184 174 158   Gran % % 74.0* 67.5 66.1   Lymph % % 13.6* 11.3* 10.5*   Mono % % 8.7 11.3 11.3   Eosinophil % % 3.1 9.1* 11.6*   Basophil % % 0.5 0.6 0.4   Differential Method  Automated Automated Automated       Metabolic Panel (last 72 hours):  Recent Labs   Lab Result Units 10/02/23  1213 10/02/23  1554 10/03/23  0439 10/04/23  0224   Sodium mmol/L 139  --  139 136   Potassium mmol/L 4.0  --  3.6 3.9   Chloride mmol/L 105  --  106  102   CO2 mmol/L 25  --  27 25   Glucose mg/dL 43*  --  39* 373*   Glucose, UA   --  Negative  --   --    BUN mg/dL 48*  --  43* 46*   Creatinine mg/dL 2.6*  --  2.4* 3.5*   Albumin g/dL 2.3*  --  2.3* 2.2*   Total Bilirubin mg/dL 0.2  --  0.3 0.3   Alkaline Phosphatase U/L 38*  --  39* 42*   AST U/L 16  --  17 14   ALT U/L 13  --  12 11   Magnesium mg/dL 1.8  --  1.8 1.8   Phosphorus mg/dL 3.3  --   --   --        Vancomycin Administrations:  vancomycin given in the last 96 hours                     vancomycin 1,250 mg in dextrose 5 % (D5W) 250 mL IVPB (Vial-Mate) (mg) 1,250 mg New Bag 10/03/23 1602    vancomycin 2 g in dextrose 5 % 500 mL IVPB (mg) 2,000 mg New Bag 10/02/23 1821                    Microbiologic Results:  Microbiology Results (last 7 days)       Procedure Component Value Units Date/Time    Blood culture [[id number]]  (Abnormal) Collected: 10/02/23 1701    Order Status: Completed Specimen: Blood from Line, PICC Left Basilic Updated: 10/04/23 0832     Blood Culture, Routine Gram stain aer bottle: Gram negative rods      Gram stain jumana bottle: Gram negative rods      Results called to and read back by:Alejandra Figueroa RN 10/03/2023  06:29      GRAM NEGATIVE TODD  Identification pending  For susceptibility see order #[id number]      Blood culture [[id number]]  (Abnormal) Collected: 10/02/23 1701    Order Status: Completed Specimen: Blood from Line, PICC Left Basilic Updated: 10/04/23 0830     Blood Culture, Routine Gram stain aer bottle: Gram negative rods      Gram stain jumana bottle: Gram negative rods      Results called to and read back by:Alejandra Figueroa RN 10/03/2023  06:29      GRAM NEGATIVE TODD  Identification and susceptibility pending      Culture, Anaerobe [[id number]] Collected: 10/03/23 1216    Order Status: Completed Specimen: Wound from Foot, Left Updated: 10/04/23 0634     Anaerobic Culture Culture in progress    Narrative:      Left heel wound    Blood culture [[id number]]     Order  Status: No result Specimen: Blood     Blood culture [[id number]]     Order Status: No result Specimen: Blood     Urine culture [[id number]] Collected: 10/02/23 1554    Order Status: Completed Specimen: Urine Updated: 10/03/23 2012     Urine Culture, Routine No growth    Narrative:      Specimen Source->Urine    Gram stain [[id number]] Collected: 10/03/23 1216    Order Status: Completed Specimen: Wound from Foot, Left Updated: 10/03/23 1748     Gram Stain Result No WBC's      No organisms seen    Narrative:      Left heel wound    Aerobic culture [[id number]] Collected: 10/03/23 1216    Order Status: Sent Specimen: Wound from Foot, Left Updated: 10/03/23 1357    Rapid Organism ID by PCR (from Blood culture) [[id number]]  (Abnormal) Collected: 10/02/23 1701    Order Status: Completed Updated: 10/03/23 0912     Enterococcus faecalis Not Detected     Enterococcus faecium Not Detected     Listeria monocytogenes Not Detected     Staphylococcus spp. Not Detected     Staphylococcus aureus Not Detected     Staphylococcus epidermidis Not Detected     Staphylococcus lugdunensis Not Detected     Streptococcus species Not Detected     Streptococcus agalactiae Not Detected     Streptococcus pneumoniae Not Detected     Streptococcus pyogenes Not Detected     Acinetobacter calcoaceticus/baumannii complex Detected     Bacteroides fragilis Not Detected     Enterobacterales Not Detected     Enterobacter cloacae complex Not Detected     Escherichia coli Not Detected     Klebsiella aerogenes Not Detected     Klebsiella oxytoca Not Detected     Klebsiella pneumoniae group Not Detected     Proteus Not Detected     Salmonella sp Not Detected     Serratia marcescens Not Detected     Haemophilus influenzae Not Detected     Neisseria meningtidis Not Detected     Pseudomonas aeruginosa Not Detected     Stenotrophomonas maltophilia Not Detected     Candida albicans Not Detected     Candida auris Not Detected     Candida glabrata Not Detected      Ting krusei Not Detected     Candida parapsilosis Not Detected     Candida tropicalis Not Detected     Cryptococcus neoformans/gattii Not Detected     CTX-M (ESBL ) Not Detected     IMP (Carbapenem resistant) Not Detected     KPC resistance gene (Carbapenem resistant) Not Detected     mcr-1  Test Not Applicable     mec A/C  Test Not Applicable     mec A/C and MREJ (MRSA) gene Test Not Applicable     NDM (Carbapenem resistant) Not Detected     OXA-48-like (Carbapenem resistant) Test Not Applicable     van A/B (VRE gene) Test Not Applicable     VIM (Carbapenem resistant) Not Detected    Aerobic culture [[id number]]     Order Status: No result Specimen: Skin from Ankle, Right

## 2023-10-04 NOTE — PROGRESS NOTES
[facility]  Cardiology  Progress Note    Patient Name: Saji Castañeda  MRN: [id number]  Admission Date: 10/2/2023  Hospital Length of Stay: 2 days  Code Status: Full Code   Attending Physician: Blanca Marin MD   Primary Care Physician: [facility] -  Expected Discharge Date: 10/10/2023  Principal Problem:History of complete heart block    Subjective:     Hospital Course:   Pt with osteomyelitis admitted for encephalopathy and weakness concerning for septic shock. EKG with bradycardia and complete heart block so admitted to CCU. Started on dobutamine for inotropic support in setting of hypertension. Pt started on Vanc and Zosyn. BCx +GNR bacteremia with PCR positive for Acinetobacter, Klebsiella pneumoniae. Due to complaints of aphasia, CT head ordered which did not show acute stroke. MRI also without acute ischemia. ID consulted as pt has been on Linezolid, Cefepime, and Flagyl for osteomyelitis. PICC line removed and access replaced in anticipation for long-term IV abx. Podiatry following who recommended extremities XR, wound cultured, and wound care consult. Course also with worsening acute on chronic renal failure likely due to sepsis. Renal US without obstruction, pt given fluid resuscitation. With treatment of bacteremia, pt's heart rate able to augment appropriately with exertion with p waves transmitting. Repeat EKG showing 2nd degree AV block which replaced junctional rhythm.       Interval History: HR stable off dobutamine, augments appropriately with exertion to 80s and 50s at rest.    Patient is complaining of leg pain near his infection sites but otherwise subjectively feels better today.     Review of Systems   Constitutional: Negative for chills and fever.   Eyes:  Negative for blurred vision and double vision.   Cardiovascular:  Negative for chest pain and syncope.   Musculoskeletal:  Positive for myalgias (bilateral lower extremity).   Gastrointestinal:   Negative for abdominal pain, melena and vomiting.   Genitourinary:  Negative for dysuria and hematuria.   Neurological:  Negative for dizziness.        (+) speech change   Psychiatric/Behavioral:  Negative for depression and hallucinations.      Objective:     Vital Signs (Most Recent):  Temp: 98.2 °F (36.8 °C) (10/04/23 1101)  Pulse: (!) 58 (10/04/23 1301)  Resp: 17 (10/04/23 1301)  BP: (!) 143/65 (10/04/23 1301)  SpO2: 97 % (10/04/23 1301) Vital Signs (24h Range):  Temp:  [97.8 °F (36.6 °C)-98.2 °F (36.8 °C)] 98.2 °F (36.8 °C)  Pulse:  [] 58  Resp:  [15-35] 17  SpO2:  [95 %-99 %] 97 %  BP: (122-203)/() 143/65     Weight: 112.5 kg (248 lb)  Body mass index is 36.62 kg/m².     SpO2: 97 %         Intake/Output Summary (Last 24 hours) at 10/4/2023 1350  Last data filed at 10/4/2023 1301  Gross per 24 hour   Intake 1828.36 ml   Output 250 ml   Net 1578.36 ml         Lines/Drains/Airways       Central Venous Catheter Line  Duration                  Percutaneous Central Line Insertion/Assessment - Cordis 10/02/23 1650 Internal Jugular Right 1 day              Drain  Duration             Male External Urinary Catheter 10/03/23 1958 Other (Comment) <1 day              Peripheral Intravenous Line  Duration                  Peripheral IV - Single Lumen 10/04/23 0935 20 G;1 3/4 in Left Forearm <1 day         Peripheral IV - Single Lumen 10/04/23 1000 20 G Anterior;Left Forearm <1 day                       Physical Exam  Constitutional:       Appearance: He is obese. He is ill-appearing (chronically).   HENT:      Head: Normocephalic and atraumatic.      Nose: Nose normal. No congestion or rhinorrhea.      Mouth/Throat:      Mouth: Mucous membranes are moist.      Comments: Poor dentition.   Eyes:      General: No scleral icterus.        Right eye: No discharge.         Left eye: No discharge.      Extraocular Movements: Extraocular movements intact.   Neck:      Comments: [place] cath in neck.   Cardiovascular:       Rate and Rhythm: Normal rate.      Heart sounds:      No friction rub.      Comments: Occassional PVCs.  Pulmonary:      Effort: Pulmonary effort is normal. No respiratory distress.      Breath sounds: No stridor. No wheezing.   Abdominal:      General: Abdomen is flat. There is no distension.      Palpations: Abdomen is soft.   Musculoskeletal:      Right lower leg: Edema present.      Left lower leg: Edema present.   Skin:     General: Skin is warm and dry.      Comments: Bilateral venous stasis changes, flaky skin to bilateral lower extremities. S/p partial foot amputation L appears infected, with granulation tissue.   Neurological:      Mental Status: He is alert and oriented to person, place, and time.      Gait: Gait abnormal.   Psychiatric:         Mood and Affect: Mood normal.         Behavior: Behavior normal.            Significant Labs: Blood Culture:   Recent Labs   Lab 10/02/23  1701   LABBLOO Gram stain aer bottle: Gram negative rods  Gram stain jumana bottle: Gram negative rods  Results called to and read back by:Alejandra Figueroa RN 10/03/2023  06:29  KLEBSIELLA PNEUMONIAE  Susceptibility pending  *  ACINETOBACTER SPECIES  further identified as Acinetobacter baumannii nosocomialis group  Susceptibility pending  *  Gram stain aer bottle: Gram negative rods  Gram stain jumana bottle: Gram negative rods  Results called to and read back by:Alejandra Figueroa RN 10/03/2023  06:29  GRAM NEGATIVE TODD  Identification pending  For susceptibility see order #[id number]  *     , BMP:   Recent Labs   Lab 10/03/23  0439 10/04/23  0224   GLU 39* 373*    136   K 3.6 3.9    102   CO2 27 25   BUN 43* 46*   CREATININE 2.4* 3.5*   CALCIUM 9.0 8.5*   MG 1.8 1.8     , CBC   Recent Labs   Lab 10/03/23  0439 10/04/23  0224   WBC 8.53 8.30   HGB 8.7* 8.4*   HCT 28.1* 25.7*    158     , and All pertinent lab results from the last 24 hours have been reviewed.    Significant Imaging: All pertinent imaging  results from the last 24 hours have been reviewed.    Assessment and Plan:       * History of complete heart block  2nd degree AV block type 2    Patient is a 73 year old male with PVD c/b chronic bilateral OM with left foot amputation, paroxysmal atrial fibrillation on Eliquis, T2DM c/b neuropathy, HTN, CKD, PVD, VTE of RUE in June 2022, and asymptomatic [place] Type 1 AV block.       Plan:  -discontinue dobutamine, HR augementing appropriately with P waves transmitting  -If pt returns to complete heart block, he is a poor candidate for PPM due to high infection risk (chronic poor healing wounds)  -telemetry and electrolyte replacements  -treating severe sepsis     Sepsis  History of OM with Klebsiella and Pseudomonas with VRE in the past   Blood and wound cultures  Antibiotics  ID consult    Paroxysmal atrial fibrillation  ECGs with atrial fibrillation with slow VR in the past  On ELiquis for VTE of the RUE as well  CHADS2-VASc 4    Chronic osteomyelitis  Came from NH with IV linezolid, cefepime, and metronidazole  ID consultation for assistance in management    Nonhealing ulcer of right lower leg with fat layer exposed  See PAD    Goals of care, counseling/discussion  Elects sister, January, as his HPOA.  Team with difficulty contacting sibling.    -will continue to reach out    Chronic deep vein thrombosis (DVT) of right upper extremity  Continue Eliquis 5 mg PO BID, if continues to have worsening renal dysfunction will switch to heparin gtt     Bacteremia due to Gram-negative bacteria  Klebsiella pneumoniae bacteremia  Actinobacter bacteremia  Chronic OM of L foot    Came from NH with IV linezolid, cefepime, and metronidazole for chronic osteo (did grow Klebsiella, pseudomonas).     -podiatry following with tests ordered, recommending wound care to assume at this time  -ID consultation for assistance in management  -PICC line removed 10/3, will monitor replacement lines  -continue Vanc+Zosyn  -f/u wound  culture, bcx    Peripheral arterial disease  - long standing wounds with skin graft to left TMA in [id number]; LE angio in 2014 with patent left CFA, SFA, PFA with 3V run off on LE angiogram by Vascular surgery  - CTA LE with run off 6/2022 with moderate to high grade disease of right popliteal with severely diseased AT, PT, peroneal and multiple occlusion of left popliteal with occluded PT and peroneal and short segment occlusion of AT- patient denies any previous intervention  - BLE arterial ultrasound 3/2023 with similar findings suggestive of bilateral popliteal and BTK disease; seen by Vascular surgery at [facility] with recommendation for amputation- patient declined  - transferred to [facility] 05/2023 for evaluation for revascularization; extensive disease and multiple co morbidities along with debility/bed bound status and concern for non compliance, deemed not a  Revascularization candidate and placed on statin and Plavix; no ASA given risk of bleeding with triple therapy    PLAN:  -continue plavix, statin  -vascular surgery recs for no surgical interventions at this time  -pending ABIs  -PT/OT      Acute on chronic renal failure  Cr uptrending from baseline of 1.5 ->3.5.  Likely due to complex co-morbidities with current bacteremia.  Renal US without obstruction/hydronephrosis.  Microalbumin/Cr elevated.   UA +leukocytes+RBCs, UCx negative.    -BMP daily  -strict intake and output  -IVFs resuscitation    Insulin dependent type 2 diabetes mellitus  Insulin at NH, persistent hypoglycemia now rebounded back to hyperglycemia    -Insulin aspart 3 units TIDW + LDSSI due to acute renal dysfunction  -diabetic diet, dysphagia  -Will re-introduce insulin as renal functions improves        VTE Risk Mitigation (From admission, onward)         Ordered     apixaban tablet 5 mg  2 times daily         10/02/23 1544     IP VTE HIGH RISK PATIENT  Once         10/02/23 1513     Place sequential compression device  Until  discontinued         10/02/23 1513                Meli Escobedo MD  Cardiology  [facility]

## 2023-10-04 NOTE — RESIDENT HANDOFF
Handoff     Primary Team: [facility] CARDIOLOGY CCU Room Number: [id number]     Patient Name: Saji Castañeda MRN: [id number]     Date of Birth: [id number] Allergies: Patient has no known allergies.     Age: 73 y.o. Admit Date: 10/2/2023     Sex: male  BMI: Body mass index is 36.62 kg/m².     Code Status: Full Code        Illness Level (current clinical status): Watcher - Yes - needs telemetry due to hx of heart block    Reason for Admission: Bacteremia due to Gram-negative bacteria    Brief HPI : 73 y.o. M with type 2 DM, severe PAD, debility, hx of type 2 AV block, chronic OM s/p L foot amputation who is here for GNR bacteremia. He presented from nursing facility for generalized fatigue and weakness, feeling ill. No leukocytosis, afebrile, hypertensive but with renal dysfunction, encephalopathy, hypoglycemia, new heart block with infectious appearing bilateral lower extremity wounds concerning for sepsis.      Hospital Course : Here for GNR bacteremia likely 2/2 to poor healing chronic OM and infected PICC line, found initially to have complete heart block so admitted to CCU. Team placed him on dobutamine to initially augment HR while in severe sepsis, off of gtt as of early 10/4 AM. Found to have Klebsiella and Actinobacter bacteremia, on Vanc and Zosyn. ID and podiatry following, wound care consulted to take over for podiatry. Not a surgical candidate per vascular surgery. Pt now back to sinus rhythm with intermittent 2nd degree AV block. EP was consulted, but not a candidate for PPM due to high infection risk. Course complicated by ongoing acute on chronic renal failure likely due to severe sepsis without full IVF resuscitation. Renal US without obstruction, UCx negative. He was initially hypoglycemic and required D5 gtt. Mentation improved and became hyperglycemic with initiation of diet. Difficulties reaching sister who he appointed as HPOA. Pt's mentation improved and explained complex co-morbidities to patient.  At this time, pt would like to remain full code with hopes of rehabilitation.    Tasks (specific, using if-then statements):   -If febrile, consider broadening to carbapenem due to extensive history of IV antibiotics/resistance  -Insulin will need to be titrated due to hyperglycemia and worsening renal failure (not on long-acting currently)  -F/u ID and wound care recs   -F/u PT/OT (still with good upper body strength)     Contingency Plan (special circumstances anticipated and plan): Anticipate needing palliative care consult for GOC due to complex co-morbidities without curative solution - he is not surgical candidate with ongoing poor wound healing and recurrent infections, not candidate for PPM    Estimated Discharge Date: 10/10/23    Discharge Disposition: Skilled Nursing Facility    Mentored By: Dr. Marin

## 2023-10-04 NOTE — PLAN OF CARE
Pt with multiple rhythm changes overnight, MD aware, progressed from complete heart block to first degree HB,  accelerated junc tach, all without symptoms, pt rouses to voice, oriented, assessments showed no s/s during rhythm changes, Dr Kaur make no changes, dobutamine infusion continued.  Pt rate eventually reduced to 60-70's, junctional and at times 1st degree HB.  Picc line could not be removed due to the lack of access and incompatible infusions.  Pt afebrile, alert and oriented times 4, assessment per flowsheet.      Problem: Adult Inpatient Plan of Care  Goal: Plan of Care Review  Outcome: Ongoing, Progressing  Goal: Patient-Specific Goal (Individualized)  Outcome: Ongoing, Progressing  Goal: Absence of Hospital-Acquired Illness or Injury  Outcome: Ongoing, Progressing  Goal: Optimal Comfort and Wellbeing  Outcome: Ongoing, Progressing  Goal: Readiness for Transition of Care  Outcome: Ongoing, Progressing     Problem: Diabetes Comorbidity  Goal: Blood Glucose Level Within Targeted Range  Outcome: Ongoing, Progressing     Problem: Adjustment to Illness (Sepsis/Septic Shock)  Goal: Optimal Coping  Outcome: Ongoing, Progressing     Problem: Bleeding (Sepsis/Septic Shock)  Goal: Absence of Bleeding  Outcome: Ongoing, Progressing     Problem: Glycemic Control Impaired (Sepsis/Septic Shock)  Goal: Blood Glucose Level Within Desired Range  Outcome: Ongoing, Progressing     Problem: Infection Progression (Sepsis/Septic Shock)  Goal: Absence of Infection Signs and Symptoms  Outcome: Ongoing, Progressing     Problem: Nutrition Impaired (Sepsis/Septic Shock)  Goal: Optimal Nutrition Intake  Outcome: Ongoing, Progressing     Problem: Infection  Goal: Absence of Infection Signs and Symptoms  Outcome: Ongoing, Progressing     Problem: Skin Injury Risk Increased  Goal: Skin Health and Integrity  Outcome: Ongoing, Progressing

## 2023-10-04 NOTE — ASSESSMENT & PLAN NOTE
Insulin at NH, persistent hypoglycemia now rebounded back to hyperglycemia    -Insulin aspart 3 units TIDW + LDSSI due to acute renal dysfunction  -diabetic diet, dysphagia  -Will re-introduce insulin as renal functions improves

## 2023-10-04 NOTE — ASSESSMENT & PLAN NOTE
Elects sister, January, as his HPOA.  Team with difficulty contacting sibling.    -will continue to reach out

## 2023-10-04 NOTE — CONSULTS
[facility]  Wound Care    Patient Name:  Saji Castañeda   MRN:  [id number]  Date: 10/4/2023  Diagnosis: History of complete heart block    History:     Past Medical History:   Diagnosis Date    Arthritis     legs    Diabetes mellitus     Diabetes mellitus, type 2     Hyperlipidemia     Hypertension     Osteomyelitis     Palliative care encounter 5/24/2023       Social History     Socioeconomic History    Marital status: Single   Tobacco Use    Smoking status: Never    Smokeless tobacco: Never   Substance and Sexual Activity    Alcohol use: No     Comment: occassional    Drug use: No    Sexual activity: Not Currently   Social History Narrative    Not currently working; lives with family     Social Determinants of Health     Financial Resource Strain: Low Risk  (10/3/2023)    Overall Financial Resource Strain (CARDIA)     Difficulty of Paying Living Expenses: Not hard at all   Food Insecurity: No Food Insecurity (10/3/2023)    Hunger Vital Sign     Worried About Running Out of Food in the Last Year: Never true     Ran Out of Food in the Last Year: Never true   Transportation Needs: No Transportation Needs (10/3/2023)    PRAPARE - Transportation     Lack of Transportation (Medical): No     Lack of Transportation (Non-Medical): No   Physical Activity: Unknown (10/3/2023)    Exercise Vital Sign     Days of Exercise per Week: Patient refused     Minutes of Exercise per Session: Patient refused   Stress: Stress Concern Present (5/23/2023)    [employer] [place] [employer] - Occupational Stress Questionnaire     Feeling of Stress : Rather much   Social Connections: Moderately Isolated (10/3/2023)    Social Connection and Isolation Panel [NHANES]     Frequency of Communication with Friends and Family: More than three times a week     Frequency of Social Gatherings with Friends and Family: Patient refused     Attends Protestant Services: More than 4 times per year     Active Member of Clubs or  Organizations: No     Attends Club or Organization Meetings: Never     Marital Status: Never    Housing Stability: Low Risk  (10/3/2023)    Housing Stability Vital Sign     Unable to Pay for Housing in the Last Year: No     Number of Places Lived in the Last Year: 1     Unstable Housing in the Last Year: No       Precautions:     Allergies as of 10/02/2023    (No Known Allergies)       [facility] Assessment Details/Treatment     Patient seen for wound care consultation for multiple wounds.   Reviewed chart for this encounter.   See Flow Sheet for findings.    Pt found lying in bed, agreeable to care at this time, podiatry following for anterior R foot. BL LE are dry and flaky - recommended purple top lotion PRN. R heel foam removed, pt has x2 small areas of dry eschar to R heel, cleansed w/ NS, applied betadine and foam dressing. L posterior stump w/ moist granulating, red wound w/ pink, dry boarders - scant serosanguinous drainage, cleansed wound w/ NS and applied hydrofera blue, secured w/ foam. Pt w/ moist abd folds and groin w/ intact, moist epidermal sloughing - Antifungal powder applied. Pt turned into lateral position for assessment of buttocks - buttocks intact w/ pink scar tissue, recommend z-guard.    RECOMMENDATIONS:  Daily - R heel - cleanse gently w/ NS, pat dry. Apply betadine and allow to dry before applying foam dressing.  Q3 days/PRN saturation - L stump - cleanse gently w/ NS, allow to dry. Apply Hydrofera blue (can order through epic) to wound bed and secure w/ foam dressing.  BID/PRN - groin/abd folds - cleanse gently w/ soap and water or baby wipes, pat dry. Apply Miconazole powder (see MAR) to groin and abd folds. Keep pt clean and dry.  BID/PRN - Buttocks - cleanse gently w/ soap and water, pat dry and apply Z-guard paste. No foam dressings or diapers.    Discussed POC with patient and primary nurse.   See EMR for orders & patient education.      Nursing to continue care & monitoring.  Nursing  to maintain pressure injury prevention interventions.       10/04/23 1103   WOCN Assessment   WOCN Total Time (mins) 45   Visit Date 10/04/23   Visit Time 1103   Consult Type New   WOCN Speciality Wound   Intervention assessed;changed;applied;chart review;coordination of care;orders   Teaching on-going        Altered Skin Integrity 10/03/23 1900 Left anterior;posterior Foot #1 Other (comment) Full thickness tissue loss. Subcutaneous fat may be visible but bone, tendon or muscle are not exposed   Date First Assessed/Time First Assessed: 10/03/23 1900   Altered Skin Integrity Present on Admission - Did Patient arrive to the hospital with altered skin?: yes  Side: Left  Orientation: anterior;posterior  Location: Foot  Wound Number: #1  Is this i...   Wound Image    Dressing Appearance Intact;Moist drainage   Drainage Amount Scant   Drainage Characteristics/Odor Serosanguineous   Appearance Granulating;Red;Moist;Pink   Periwound Area Intact;Dry   Wound Edges Defined   Care Cleansed with:;Sterile normal saline   Dressing Applied;Methylene blue/gentian violet;Foam   Dressing Change Due 10/06/23        Altered Skin Integrity 10/03/23 1900 Right posterior;midline Heel #2 Ulceration Full thickness tissue loss. Base is covered by slough and/or eschar in the wound bed   Date First Assessed/Time First Assessed: 10/03/23 1900   Altered Skin Integrity Present on Admission - Did Patient arrive to the hospital with altered skin?: (c) yes  Side: Right  Orientation: posterior;midline  Location: Heel  Wound Number: #2  Is th...   Wound Image    Description of Altered Skin Integrity Full thickness tissue loss. Base is covered by slough and/or eschar in the wound bed   Dressing Appearance Dry;Intact;Clean   Drainage Amount None   Drainage Characteristics/Odor No odor   Appearance Intact;Dry;Eschar   Periwound Area Intact;Dry   Care Cleansed with:;Sterile normal saline;Applied:;Povidone iodine   Dressing Applied;Foam   Dressing Change  Due 10/05/23

## 2023-10-04 NOTE — NURSING
Notified Dr Bhardwaj: HR 49-50, currently asleep. HR 55-60 when awake. Dobutamine has been held for approx one hour.    Continue to monitor.

## 2023-10-05 PROBLEM — A41.9 SEPSIS: Status: RESOLVED | Noted: 2023-10-02 | Resolved: 2023-10-05

## 2023-10-05 PROBLEM — Z86.79 HISTORY OF COMPLETE HEART BLOCK: Chronic | Status: ACTIVE | Noted: 2023-10-02

## 2023-10-05 PROBLEM — I82.721 CHRONIC DEEP VEIN THROMBOSIS (DVT) OF RIGHT UPPER EXTREMITY: Chronic | Status: ACTIVE | Noted: 2022-07-06

## 2023-10-05 PROBLEM — I48.0 PAROXYSMAL ATRIAL FIBRILLATION: Chronic | Status: ACTIVE | Noted: 2023-10-02

## 2023-10-05 LAB
ALBUMIN SERPL BCP-MCNC: 2.1 G/DL (ref 3.5–5.2)
ALP SERPL-CCNC: 46 U/L (ref 55–135)
ALT SERPL W/O P-5'-P-CCNC: 10 U/L (ref 10–44)
ANION GAP SERPL CALC-SCNC: 7 MMOL/L (ref 8–16)
AST SERPL-CCNC: 17 U/L (ref 10–40)
BACTERIA UR CULT: NORMAL
BACTERIA UR CULT: NORMAL
BASOPHILS # BLD AUTO: 0.04 K/UL (ref 0–0.2)
BASOPHILS NFR BLD: 0.5 % (ref 0–1.9)
BILIRUB SERPL-MCNC: 0.3 MG/DL (ref 0.1–1)
BUN SERPL-MCNC: 44 MG/DL (ref 8–23)
CALCIUM SERPL-MCNC: 8.7 MG/DL (ref 8.7–10.5)
CHLORIDE SERPL-SCNC: 105 MMOL/L (ref 95–110)
CO2 SERPL-SCNC: 24 MMOL/L (ref 23–29)
CREAT SERPL-MCNC: 3.7 MG/DL (ref 0.5–1.4)
DIFFERENTIAL METHOD: ABNORMAL
EOSINOPHIL # BLD AUTO: 1.2 K/UL (ref 0–0.5)
EOSINOPHIL NFR BLD: 15.4 % (ref 0–8)
ERYTHROCYTE [DISTWIDTH] IN BLOOD BY AUTOMATED COUNT: 17.2 % (ref 11.5–14.5)
EST. GFR  (NO RACE VARIABLE): 16.4 ML/MIN/1.73 M^2
GLUCOSE SERPL-MCNC: 229 MG/DL (ref 70–110)
HCT VFR BLD AUTO: 28.2 % (ref 40–54)
HGB BLD-MCNC: 8.7 G/DL (ref 14–18)
IMM GRANULOCYTES # BLD AUTO: 0.02 K/UL (ref 0–0.04)
IMM GRANULOCYTES NFR BLD AUTO: 0.3 % (ref 0–0.5)
LYMPHOCYTES # BLD AUTO: 1.2 K/UL (ref 1–4.8)
LYMPHOCYTES NFR BLD: 15.1 % (ref 18–48)
MAGNESIUM SERPL-MCNC: 2.2 MG/DL (ref 1.6–2.6)
MCH RBC QN AUTO: 27.6 PG (ref 27–31)
MCHC RBC AUTO-ENTMCNC: 30.9 G/DL (ref 32–36)
MCV RBC AUTO: 90 FL (ref 82–98)
MONOCYTES # BLD AUTO: 0.9 K/UL (ref 0.3–1)
MONOCYTES NFR BLD: 12 % (ref 4–15)
NEUTROPHILS # BLD AUTO: 4.4 K/UL (ref 1.8–7.7)
NEUTROPHILS NFR BLD: 56.7 % (ref 38–73)
NRBC BLD-RTO: 0 /100 WBC
PLATELET # BLD AUTO: 151 K/UL (ref 150–450)
PMV BLD AUTO: 10.6 FL (ref 9.2–12.9)
POCT GLUCOSE: 216 MG/DL (ref 70–110)
POCT GLUCOSE: 239 MG/DL (ref 70–110)
POCT GLUCOSE: 241 MG/DL (ref 70–110)
POCT GLUCOSE: 246 MG/DL (ref 70–110)
POCT GLUCOSE: 248 MG/DL (ref 70–110)
POTASSIUM SERPL-SCNC: 4.4 MMOL/L (ref 3.5–5.1)
PROT SERPL-MCNC: 6.2 G/DL (ref 6–8.4)
RBC # BLD AUTO: 3.15 M/UL (ref 4.6–6.2)
SODIUM SERPL-SCNC: 136 MMOL/L (ref 136–145)
WBC # BLD AUTO: 7.67 K/UL (ref 3.9–12.7)

## 2023-10-05 PROCEDURE — 36415 COLL VENOUS BLD VENIPUNCTURE: CPT | Performed by: INTERNAL MEDICINE

## 2023-10-05 PROCEDURE — 97162 PT EVAL MOD COMPLEX 30 MIN: CPT

## 2023-10-05 PROCEDURE — 92523 SPEECH SOUND LANG COMPREHEN: CPT

## 2023-10-05 PROCEDURE — 99233 PR SUBSEQUENT HOSPITAL CARE,LEVL III: ICD-10-PCS | Mod: ,,, | Performed by: HOSPITALIST

## 2023-10-05 PROCEDURE — 80053 COMPREHEN METABOLIC PANEL: CPT | Performed by: INTERNAL MEDICINE

## 2023-10-05 PROCEDURE — 99233 SBSQ HOSP IP/OBS HIGH 50: CPT | Mod: ,,, | Performed by: PHYSICIAN ASSISTANT

## 2023-10-05 PROCEDURE — 25000003 PHARM REV CODE 250: Performed by: PHYSICIAN ASSISTANT

## 2023-10-05 PROCEDURE — 25000003 PHARM REV CODE 250: Performed by: INTERNAL MEDICINE

## 2023-10-05 PROCEDURE — 63600175 PHARM REV CODE 636 W HCPCS

## 2023-10-05 PROCEDURE — 97165 OT EVAL LOW COMPLEX 30 MIN: CPT

## 2023-10-05 PROCEDURE — 83735 ASSAY OF MAGNESIUM: CPT | Performed by: INTERNAL MEDICINE

## 2023-10-05 PROCEDURE — 21400001 HC TELEMETRY ROOM

## 2023-10-05 PROCEDURE — 25000003 PHARM REV CODE 250

## 2023-10-05 PROCEDURE — 97535 SELF CARE MNGMENT TRAINING: CPT

## 2023-10-05 PROCEDURE — 63600175 PHARM REV CODE 636 W HCPCS: Mod: JZ,TB | Performed by: PHYSICIAN ASSISTANT

## 2023-10-05 PROCEDURE — 99233 PR SUBSEQUENT HOSPITAL CARE,LEVL III: ICD-10-PCS | Mod: ,,, | Performed by: PHYSICIAN ASSISTANT

## 2023-10-05 PROCEDURE — 99233 SBSQ HOSP IP/OBS HIGH 50: CPT | Mod: ,,, | Performed by: HOSPITALIST

## 2023-10-05 PROCEDURE — 85025 COMPLETE CBC W/AUTO DIFF WBC: CPT | Performed by: INTERNAL MEDICINE

## 2023-10-05 PROCEDURE — 97530 THERAPEUTIC ACTIVITIES: CPT

## 2023-10-05 RX ORDER — FLUCONAZOLE 200 MG/1
400 TABLET ORAL ONCE
Status: COMPLETED | OUTPATIENT
Start: 2023-10-05 | End: 2023-10-05

## 2023-10-05 RX ORDER — INSULIN ASPART 100 [IU]/ML
5 INJECTION, SOLUTION INTRAVENOUS; SUBCUTANEOUS
Status: DISCONTINUED | OUTPATIENT
Start: 2023-10-05 | End: 2023-10-19 | Stop reason: HOSPADM

## 2023-10-05 RX ORDER — FLUCONAZOLE 200 MG/1
200 TABLET ORAL DAILY
Status: DISCONTINUED | OUTPATIENT
Start: 2023-10-06 | End: 2023-10-19 | Stop reason: HOSPADM

## 2023-10-05 RX ORDER — BISACODYL 5 MG
5 TABLET, DELAYED RELEASE (ENTERIC COATED) ORAL DAILY PRN
Status: DISCONTINUED | OUTPATIENT
Start: 2023-10-05 | End: 2023-10-19 | Stop reason: HOSPADM

## 2023-10-05 RX ADMIN — ACETAMINOPHEN 650 MG: 325 TABLET ORAL at 06:10

## 2023-10-05 RX ADMIN — INSULIN ASPART 3 UNITS: 100 INJECTION, SOLUTION INTRAVENOUS; SUBCUTANEOUS at 12:10

## 2023-10-05 RX ADMIN — GABAPENTIN 100 MG: 100 CAPSULE ORAL at 08:10

## 2023-10-05 RX ADMIN — CEFIDEROCOL SULFATE TOSYLATE 1 G: 1 INJECTION, POWDER, FOR SOLUTION INTRAVENOUS at 09:10

## 2023-10-05 RX ADMIN — INSULIN ASPART 5 UNITS: 100 INJECTION, SOLUTION INTRAVENOUS; SUBCUTANEOUS at 06:10

## 2023-10-05 RX ADMIN — MICONAZOLE NITRATE: 20 POWDER TOPICAL at 10:10

## 2023-10-05 RX ADMIN — INSULIN ASPART 3 UNITS: 100 INJECTION, SOLUTION INTRAVENOUS; SUBCUTANEOUS at 09:10

## 2023-10-05 RX ADMIN — APIXABAN 5 MG: 5 TABLET, FILM COATED ORAL at 09:10

## 2023-10-05 RX ADMIN — PIPERACILLIN AND TAZOBACTAM 4.5 G: 4; .5 INJECTION, POWDER, LYOPHILIZED, FOR SOLUTION INTRAVENOUS; PARENTERAL at 09:10

## 2023-10-05 RX ADMIN — HYDRALAZINE HYDROCHLORIDE 100 MG: 50 TABLET ORAL at 02:10

## 2023-10-05 RX ADMIN — ATORVASTATIN CALCIUM 80 MG: 40 TABLET, FILM COATED ORAL at 08:10

## 2023-10-05 RX ADMIN — OXYCODONE HYDROCHLORIDE AND ACETAMINOPHEN 500 MG: 500 TABLET ORAL at 08:10

## 2023-10-05 RX ADMIN — INSULIN ASPART 2 UNITS: 100 INJECTION, SOLUTION INTRAVENOUS; SUBCUTANEOUS at 12:10

## 2023-10-05 RX ADMIN — GABAPENTIN 100 MG: 100 CAPSULE ORAL at 09:10

## 2023-10-05 RX ADMIN — GABAPENTIN 100 MG: 100 CAPSULE ORAL at 02:10

## 2023-10-05 RX ADMIN — CEFIDEROCOL SULFATE TOSYLATE 1 G: 1 INJECTION, POWDER, FOR SOLUTION INTRAVENOUS at 02:10

## 2023-10-05 RX ADMIN — INSULIN ASPART 1 UNITS: 100 INJECTION, SOLUTION INTRAVENOUS; SUBCUTANEOUS at 10:10

## 2023-10-05 RX ADMIN — TAMSULOSIN HYDROCHLORIDE 0.4 MG: 0.4 CAPSULE ORAL at 09:10

## 2023-10-05 RX ADMIN — HYDRALAZINE HYDROCHLORIDE 100 MG: 50 TABLET ORAL at 10:10

## 2023-10-05 RX ADMIN — PANTOPRAZOLE SODIUM 40 MG: 40 TABLET, DELAYED RELEASE ORAL at 09:10

## 2023-10-05 RX ADMIN — OXYCODONE HYDROCHLORIDE AND ACETAMINOPHEN 500 MG: 500 TABLET ORAL at 09:10

## 2023-10-05 RX ADMIN — CLOPIDOGREL BISULFATE 75 MG: 75 TABLET ORAL at 09:10

## 2023-10-05 RX ADMIN — PIPERACILLIN AND TAZOBACTAM 4.5 G: 4; .5 INJECTION, POWDER, LYOPHILIZED, FOR SOLUTION INTRAVENOUS; PARENTERAL at 02:10

## 2023-10-05 RX ADMIN — APIXABAN 5 MG: 5 TABLET, FILM COATED ORAL at 08:10

## 2023-10-05 RX ADMIN — INSULIN ASPART 2 UNITS: 100 INJECTION, SOLUTION INTRAVENOUS; SUBCUTANEOUS at 09:10

## 2023-10-05 RX ADMIN — THERA TABS 1 TABLET: TAB at 09:10

## 2023-10-05 RX ADMIN — FLUCONAZOLE 400 MG: 200 TABLET ORAL at 02:10

## 2023-10-05 NOTE — PLAN OF CARE
Eval completed; POC established    Problem: Physical Therapy  Goal: Physical Therapy Goal  Description: Goals to be met by: 2023    Patient will increase functional independence with mobility by performin. Supine to sit with [place]  2. Bed to chair transfer with Contact Guard Assistance using LRAD  3. Wheelchair propulsion x100 feet with Supervision  4. Lower extremity exercise program x10 reps per handout, with independence    Outcome: Ongoing, Progressing

## 2023-10-05 NOTE — PROGRESS NOTES
[facility] ([place] [address])  Infectious Disease  Progress Note    Patient Name: Saji Castañeda  MRN: [id number]  Admission Date: 10/2/2023  Length of Stay: 3 days  Attending Physician: Milton Shelton MD  Primary Care Provider: [facility] -    Isolation Status: No active isolations  Assessment/Plan:      ID  * Bacteremia due to Gram-negative bacteria  73-year-old male with history of afib, T2DM, PVD s/p left foot amputation and chronic OM on IV abx (linezolid, cefepime, metronidzole) at NH presented with weakness found to have sepsis secondary to ESBL Klebsiella and  Acinetobacter bacteremia. likely source includes PICC line - also consider from left heel wound. PICC line removed 10/4/2023.    Recommendations:  1. Discontinue zosyn, start cefidericol 1gq8hr and fluconazole today  2. Follow up repeat blood cultures  3. Podiatry following, no acute intervention, MARIO studies unable to obtain, recommend further imaging and would consider vascular surgery evaluation pending vascular studies    4. Follow up wound cultures       Chronic osteomyelitis  See bacteremia        Thank you for your consult. I will follow-up with patient. Please contact us if you have any additional questions.    Raheem White PA-C  Infectious Disease  [facility] ([place] [address])    Subjective:     Principal Problem:Bacteremia due to Gram-negative bacteria    HPI: 73-year-old male with history of afib, T2DM, PVD s/p left foot amputation, chronic osteomyelitis, presented from nursing home with weakness. Patient found to have mobitz type 1 AV block, admitted to CCU. Patient started on dobutamine IV. ID consulted for management of osteomyelitis of left foot. He was being treated with linezolid, cefepime, metronidazole at his nursing home. He had a left PICC line. Patient found to have Acinetobacter baumanii bacteremia. Patient reported feeling better compared to admission, denied having any  pain.    Interval History:   NAEON  Blood cultures +ESBL Klebsiella and  Acinetobacter  Repeat blood cultures in process  Wound cultures + yeast and GNR  Pt remains stable, afebrile. No acute complaints or concerns     Review of Systems   Constitutional:  Negative for chills, diaphoresis, fatigue and fever.   HENT:  Negative for congestion, ear pain, nosebleeds, rhinorrhea and sore throat.    Eyes:  Negative for pain.   Respiratory:  Negative for cough, shortness of breath and wheezing.    Cardiovascular:  Negative for chest pain and leg swelling.   Gastrointestinal:  Negative for abdominal pain, constipation, diarrhea, nausea and vomiting.   Genitourinary:  Negative for dysuria, frequency and urgency.   Musculoskeletal:  Negative for arthralgias, back pain and neck pain.   Skin:  Positive for wound.   Neurological:  Negative for weakness, numbness and headaches.     Objective:     Vital Signs (Most Recent):  Temp: 98.1 °F (36.7 °C) (10/05/23 1220)  Pulse: (!) 50 (10/05/23 1220)  Resp: 17 (10/05/23 1220)  BP: 139/63 (10/05/23 1220)  SpO2: 95 % (10/05/23 1220) Vital Signs (24h Range):  Temp:  [98 °F (36.7 °C)-98.3 °F (36.8 °C)] 98.1 °F (36.7 °C)  Pulse:  [45-90] 50  Resp:  [13-29] 17  SpO2:  [94 %-98 %] 95 %  BP: (127-160)/(58-71) 139/63     Weight: 112.5 kg (248 lb)  Body mass index is 36.62 kg/m².    Estimated Creatinine Clearance: 21.7 mL/min (A) (based on SCr of 3.7 mg/dL (H)).     Physical Exam  Vitals and nursing note reviewed.   Constitutional:       General: He is not in acute distress.     Appearance: He is well-developed. He is not diaphoretic.   HENT:      Head: Normocephalic and atraumatic.   Eyes:      Pupils: Pupils are equal, round, and reactive to light.   Cardiovascular:      Rate and Rhythm: Normal rate and regular rhythm.      Heart sounds: Normal heart sounds. No murmur heard.     No friction rub. No gallop.   Pulmonary:      Effort: Pulmonary effort is normal. No respiratory distress.       Breath sounds: Normal breath sounds. No wheezing or rales.   Chest:      Chest wall: No tenderness.   Abdominal:      General: Bowel sounds are normal. There is no distension.      Palpations: There is no mass.      Tenderness: There is no abdominal tenderness. There is no guarding.   Musculoskeletal:         General: No deformity. Normal range of motion.      Cervical back: Normal range of motion and neck supple.   Skin:     General: Skin is warm and dry.      Findings: No erythema or rash.      Comments: RIJ line in place c/d/I  Picc line removed    Bilateral heel wounds dressed. Refer to media    Neurological:      Mental Status: He is alert and oriented to person, place, and time.   Psychiatric:         Behavior: Behavior normal.         Thought Content: Thought content normal.         Judgment: Judgment normal.          Significant Labs: All pertinent labs within the past 24 hours have been reviewed.    Significant Imaging: I have reviewed all pertinent imaging results/findings within the past 24 hours.

## 2023-10-05 NOTE — HPI
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 He lives in [place], [place].               He presented to [facility] on 10/2/2023 from [facility] due to concerns of weakness and feeling like he was going to die. He reported no dizziness or headaches. He knew his name, where he was, and the current year but had trouble remembering which nursing home he came from. Blood glucose was in the 20s. He was given dextrose in the emergency department and glucose improved to 96 mg/dL. Labs showed acute kidney injury with creatinine of 2.6 mg/dL from baseline of 1.3 and lactic acid elevated at 2.29 mmol/L. He had complete heart block. He was admitted to Cardiology.

## 2023-10-05 NOTE — HOSPITAL COURSE
Saji Castañeda is a 73 year old Black man with hypertension, hyperlipidemia, diabetes mellitus type 2 with neuropathy, left ventricular concentric hypertrophy, chronic lower extremity osteomyelitis,  presented to [facility] due to concerns of weakness and feeling like he was going to die. He had type 2 second-degree heart block. He was admitted to Cardiology.  However patient's blood pressure dropped significantly and was transferred to ICU for sepsis.  With PICC line verses acute osteomyelitis of the posterior aspect of the calcaneus as a possible source.. He was started on dobutamine, vancomycin, and piperacillin-tazobactam. Blood culture grew Acinetobacter baumannii and Klebsiella pneumoniae.Vascular Surgery suspected poor wound healing to be due to severe edema. They ordered ankle brachial indices but it was unable to be done due to the hardness of his skin. They did not recommend amputation based on their physical exam findings. Foot wound culture grew Achromobacter xylosoxidans dentrificans and Candida albicans.  Infectious Disease changed piperacillin-tazobactam to cefidericol and fluconazole. Lower extremity arterial ultrasound suggested moderate stenosis of the right anterior tibial artery and severe stenosis of the right posterior tibial artery. Vascular Surgery reviewed this and felt it would not hinder his ability to heal.   He had acute on chronic renal failure.  Repeat EKG showed 2nd degree AV block replacing junctional rhythm. Electrophysiology recommended pacemaker placement. Electrophysiology reviewed his EKG on 10/8/2023 and noted 2nd degree heart block Mobitz I. Renal function did not improve. Fractional excretion of sodium was 1.5% suggesting intrinsic cause.  Nephrology was consulted for persistent acute kidney injury and suspected ischemic acute tubular necrosis from hypoperfusion in setting of shock (on dobutamine) and symptomatic bradycardia with additional insult from sepsis  (bacteremia) and vancomycin toxicity (trough ~27 on 10/4/2023). He underwent pacemaker placement on 10/12/2023. The initial plan was to discharge him to home on IV antibiotics. Case Management found it too difficult to to arrange cefidericol at home so worked on skilled nursing facility placement. He was accepted to a facility on 10/19/2023.

## 2023-10-05 NOTE — PROGRESS NOTES
[facility] - Intensive Care ([address] Medicine  Progress Note    Patient Name: Saji Castañeda  MRN: [id number]  Patient Class: IP- Inpatient   Admission Date: 10/2/2023  Length of Stay: 3 days  Attending Physician: Milton Shelton MD  Primary Care Provider: [facility] -        Subjective:     Principal Problem:Bacteremia due to Gram-negative bacteria        HPI:  Saji Castañeda is a 73 year old Black man with hypertension, hyperlipidemia, diabetes mellitus type 2 with neuropathy, paroxysmal atrial fibrillation (anticoagulated on apixaban), Mobitz type I atrioventricular heart block, chronic kidney disease stage 3, chronic venous hypertension, peripheral vascular disease, 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 He lives in [place], [place].               He presented to [facility] on 10/2/2023 from [facility] due to concerns of weakness and feeling like he was going to die. He reported no dizziness or headaches. He knew his name, where he was, and the current year but had trouble remembering which nursing home he came from. Blood glucose was in the 20s. He was given dextrose in the emergency department and glucose improved to 96 mg/dL. Labs showed acute kidney injury with creatinine of 2.6 mg/dL from baseline of 1.3 and lactic acid elevated at 2.29 mmol/L. He had complete heart block. He was admitted to Cardiology.      Overview/Hospital Course:  Right internal jugular central venous catheter was placed. He was found to have acute osteomyelitis of the posterior aspect of the calcaneus. He had hypotension concerning for septic shock. He was started on dobutamine, vancomycin, and piperacillin-tazobactam. Blood culture grew Gram negative rods. PCR was  positive for Acinetobacter and Klebsiella pneumoniae. He had aphasia. Head CT and brain MRI showed no acute stroke. Infectious Disease was consulted as he had been on linezolid, cefepime, and metronidazole for osteomyelitis. PICC line was removed and IV access was replaced in anticipation for long-term IV antibiotics. Podiatry was consulted and recommended X-rays and Wound Care consult. Wound was cultured. He had acute on chronic renal failure. Renal ultrasound showed no obstruction. He was given fluid resuscitation. Heart rate was able to augment appropriately with exertion with transmission of P waves. Repeat EKG showed 2nd degree AV block replacing junctional rhythm. Electrophysiology recommended pacemaker placement. He was transferred to [facility] Medicine Team D on 10/5/2023.       Interval History: Stepdown from Cardiac ICU. Still has a right IJ central line. Per transferring team, no indication to continue having it outside the ICU.    Review of Systems   Constitutional:  Negative for chills and fever.   Neurological:  Negative for seizures and syncope.     Objective:     Vital Signs (Most Recent):  Temp: 98.1 °F (36.7 °C) (10/05/23 0745)  Pulse: (!) 45 (10/05/23 0745)  Resp: 19 (10/05/23 0745)  BP: 139/60 (10/05/23 0745)  SpO2: 96 % (10/05/23 0745) Vital Signs (24h Range):  Temp:  [98 °F (36.7 °C)-98.3 °F (36.8 °C)] 98.1 °F (36.7 °C)  Pulse:  [45-90] 45  Resp:  [13-29] 19  SpO2:  [94 %-99 %] 96 %  BP: (127-160)/(58-71) 139/60     Weight: 112.5 kg (248 lb)  Body mass index is 36.62 kg/m².    Intake/Output Summary (Last 24 hours) at 10/5/2023 0905  Last data filed at 10/4/2023 1801  Gross per 24 hour   Intake 857.86 ml   Output 250 ml   Net 607.86 ml         Physical Exam  Vitals and nursing note reviewed.   Constitutional:       General: He is not in acute distress.     Appearance: He is well-developed. He is obese. He is not diaphoretic.   Skin:     General: Skin is warm and dry.      Coloration: Skin is not  jaundiced or pale.   Neurological:      Mental Status: He is alert and oriented to person, place, and time. Mental status is at baseline.      Motor: No seizure activity.   Psychiatric:         Attention and Perception: Attention normal.         Mood and Affect: Affect normal.         Behavior: Behavior is not aggressive or combative.             Significant Labs: All pertinent labs within the past 24 hours have been reviewed.    Significant Imaging: I have reviewed all pertinent imaging results/findings within the past 24 hours.  X-Ray Chest 1 View 10/02/23: FINDINGS:   There is a left-sided PICC line in place.  The distal component of the catheter is difficult to identified.  There appears to have been retraction into the left brachiocephalic vein.  Monitoring EKG leads are present.   The trachea is unremarkable.  There are calcifications of the aortic knob.  The cardiomediastinal silhouette is within normal limits.  There is no evidence of free air beneath the hemidiaphragms.  There are no pleural effusions.  There is no evidence of a pneumothorax.  There is no evidence of pneumomediastinum.  There are bilateral interstitial opacities.  There is no focal consolidation.  There are degenerative changes in the osseous structures.   Impression:  1.  Interval retraction of the left-sided PICC line with the tip in the left brachiocephalic vein.   2.  Cardiomegaly with mild bilateral interstitial opacities.  No focal consolidation.   X-Ray Chest AP Portable 10/02/23: FINDINGS:   Right central venous catheter tip projects at the level of the jugular/SVC junction.  Left PICC catheter tip projects in the region of the brachiocephalic.  There is no pneumothorax or significant interval detrimental change otherwise in the cardiopulmonary status since the previous exam.   CT Head Without Contrast 10/03/23: FINDINGS:   There is posterior right scalp soft tissue induration.  There is no acute intracranial hemorrhage,  hydrocephalus, midline shift or mass effect. There is generalized cerebral volume loss compensatory prominence of cerebral sulci and the ventricular system.  Gray-white matter differentiation appears maintained with microangiopathic change.  If there is clinical concern for acute ischemia, further assessment with MRI is advised if there are no clinical contraindications.  Basal cisterns are patent.  There is mucosal thickening and fluid/aerated secretions in the left maxillary sinus.  Correlation for acute sinusitis advised.  Remaining paranasal sinuses and mastoid air cells are clear.  The visualized bones of the calvarium demonstrate no acute osseous abnormality.   Impression:  1. No CT evidence of acute intracranial abnormality.  If there is clinical concern for acute ischemia, further evaluation with MRI is advised if there are no clinical contraindications.   2. Generalized cerebral volume loss and microangiopathic change.   3. Findings concerning for acute left maxillary sinusitis.  Clinical correlation advised.   MRI Brain Without Contrast 10/03/23: FINDINGS:   Study distorted by patient motion.  Within limits of the study there is no restricted diffusion to suggest acute or recent infarction.  Generalized cerebral volume loss with compensatory enlargement ventricles sulci and cisterns similar to prior without evidence for hydrocephalus   Major intracranial skull base T2 flow voids are present.  There is slight hypoplasia of the posterior circulation.  Punctate probable encephalomalacia left inferior cerebellum suggestive for remote infarction.  Few punctate foci of T2 FLAIR signal hyperintensities within the supratentorial white matter which are nonspecific and may represent mild chronic ischemic change   Mild moderate probable mucosal thickening left maxillary antra.  There is no abnormal parenchymal susceptibility to suggest parenchymal hemorrhage.   Incidental heterogeneous signal within the right  parietooccipital scalp soft tissues which may be sequela of prior traumatic injury.   Impression:  Cerebral volume loss with few punctate foci of T2 FLAIR signal abnormality supratentorial white matter which are nonspecific and may represent mild chronic ischemic change.   Punctate probable remote left cerebellar lacunar type infarct   No evidence for acute infarction otherwise unremarkable motion distorted noncontrast MRI brain specifically without evidence for acute infarction.   Transthoracic echo complete with contrast 10/03/23:     Left Ventricle: The left ventricle is normal in size. Ventricular mass is normal. Increased wall thickness. There is concentric remodeling. Normal wall motion. There is normal systolic function with a visually estimated ejection fraction of 60 - 65%. Ejection fraction by visual approximation is 65%. There is normal diastolic function.    Right Ventricle: Normal right ventricular cavity size. Wall thickness is normal. Right ventricle wall motion  is normal. Systolic function is normal.    Aortic Valve: There is aortic valve sclerosis.    Mitral Valve: There is mild mitral annular calcification present.    IVC/SVC: Normal venous pressure at 3 mmHg.  X-Ray Tibia Fibula Bilateral 10/03/23: FINDINGS:   There is osteopenia.  There are vascular calcifications.   Right tibia/fibula: No acute fracture or dislocation.  Alignment is normal.  There is chronic periosteal reaction about the fibular shaft with ossification of the interosseous membrane.  Suspected remote fracture of mid fibular diaphysis.  There is soft tissue edema.  There are vascular calcifications.   Left tibia/fibula: No acute fracture or dislocation.  Alignment is normal.  There is chronic periosteal reaction about the fibular shaft and distal metaphysis with ossification of the interosseous membrane.  There is soft tissue edema.   X-Ray Foot Complete Right 10/03/23: FINDINGS:   There is osteopenia.  There is no evidence  of acute osteomyelitis.  There is a chronic deformity of the 5th metatarsal, stable.  Scattered joint space narrowing.  There are vascular calcifications.  There is soft tissue edema.   X-Ray Ankle Complete Left 10/03/23: FINDINGS:   There are postoperative changes of left foot amputation.  There is a large plantar soft tissue ulcer of the heel with underlying erosive changes and sclerotic changes of the posterior aspect of the calcaneus, concerning for osteomyelitis.  There is also an ulcer of the amputation stump.  There are vascular calcifications.  There are foci of soft tissue gas.   Impression:  1. Acute osteomyelitis of the posterior aspect of the calcaneus.   2. Multiple soft tissue ulcers with soft tissue gas throughout the ankle and distal tib/fib.   US Retroperitoneal Complete 10/04/23: FINDINGS:   Right kidney: The right kidney measures 12.1 cm. No cortical thinning. No loss of corticomedullary distinction. Resistive index measures 0.90, elevated (previously 0.73), mild decreased perfusion..  No mass. No renal stone. No hydronephrosis.   Left kidney: The left kidney measures 12.8 cm. No cortical thinning. No loss of corticomedullary distinction. Resistive index measures 0.94, elevated (previously 0.76).  Mild decreased perfusion.  There is a simple cyst at the upper pole measuring 1.9 cm.  No mass. No renal stone. No hydronephrosis.   Spleen resistive index 0.76.   The bladder is partially distended at the time of scanning and has an unremarkable appearance.   Impression:  There is increased bilateral kidney resistive indices worse from the prior study suggestive of acute on chronic medical kidney disease.   Mild decreased perfusion of the bilateral kidneys.   Benign left renal cyst.   VAS Ankle Brachial Indices Resting 10/04/23: Pressure Lower   ============   Rt brachial A syst BP  149 mmHg   Impression   =========   Technically difficult study due to thickness and hardness of skin. Doppler signal  could not penetrate to get pressures. The PVR   waveforms suggests mild to minimal PAOD. More advanced imaging is suggested.       Assessment/Plan:      * Bacteremia due to Gram-negative bacteria  On piperacillin-tazobactam. Appreciate Infectious Disease.    History of complete heart block  AV block, 2nd degree  Electrophysiology recommends pacemaker.    Paroxysmal atrial fibrillation  Continue home apixaban.     Nonhealing ulcer of right lower leg with fat layer exposed  Wound care.    Chronic deep vein thrombosis (DVT) of right upper extremity  Continue home apixaban.    Peripheral arterial disease  Continue home clopidrogel and atorvastatin.    Acute on chronic renal failure  Monitor labs.     Insulin dependent type 2 diabetes mellitus  Holding home glipizide. He takes insulin glargine 45 units HS and insulin aspart 12 units TID. Giving insulin aspart 3 units TID and sliding scale. Increase insulin aspart to 5 units. Monitor Accuchecks.      VTE Risk Mitigation (From admission, onward)         Ordered     apixaban tablet 5 mg  2 times daily         10/02/23 1544     IP VTE HIGH RISK PATIENT  Once         10/02/23 1513     Place sequential compression device  Until discontinued         10/02/23 1513                Discharge Planning   OXANA: 10/10/2023     Code Status: Full Code   Is the patient medically ready for discharge?:     Reason for patient still in hospital (select all that apply): Consult recommendations  Discharge Plan A: Home with family, Home Health                  Milton Shelton MD  Department of Hospital Medicine   [facility] - Intensive Care ([place])

## 2023-10-05 NOTE — PT/OT/SLP EVAL
Physical Therapy Co-Evaluation    Patient Name:  Saji Castañeda   MRN:  [id number]    Recommendations:     Discharge Recommendations: nursing facility, skilled   Discharge Equipment Recommendations:  (TBD)   Barriers to discharge:  current level of assist required    Co-eval performed to appropriately and safely assess patient's strength and endurance while facilitating functional tasks in addition to accommodating for patient's activity/pain tolerance.    Assessment:     Saji Castañeda is a 73 y.o. male admitted with a medical diagnosis of Bacteremia due to Gram-negative bacteria.  He presents with the following impairments/functional limitations: weakness, impaired endurance, decreased ROM, impaired sensation, impaired coordination, orthopedic precautions, impaired self care skills, impaired functional mobility, decreased lower extremity function, impaired skin, impaired balance, impaired cardiopulmonary response to activity.    Pt NWB LLE and Partial WB through heel RLE with R darco shoe. Darco shoe not in room at time of eval, thus limited to bed level activity. Notified MD for darco shoe order. Pt performed bed mobility and EOB sitting with SBA-CGA. At baseline, pt performs stand pivot t/fs with assistance and uses w/c for mobility. Pt is unsafe to return home at this time and will continue to require skilled PT services upon d/c.      Rehab Prognosis: Good; patient would benefit from acute skilled PT services to address these deficits and reach maximum level of function.    Recent Surgery: * No surgery found *      Plan:     During this hospitalization, patient to be seen 3 x/week to address the identified rehab impairments via gait training, therapeutic activities, therapeutic exercises, neuromuscular re-education and progress toward the following goals:    Plan of Care Expires:  11/02/23    Subjective     Chief Complaint: Itchy   Patient/Family Comments/goals: To get better and go home  Pain/Comfort:  Pain  Rating 1: 0/10    Patients cultural, spiritual, Ep[employer]opalian conflicts given the current situation: no    Living Environment:  Pt living at [facility] and reports receiving therapy daily PTA. Per pt, he sits in w/c throughout the day. Mod(I) for mobility and ADLs. Prior to SNF, pt lived with brother and sister in [place] with 3 steps & BHR to enter. Has t/s combo for bathing but takes sponge baths at sink while seated in w/c.  Equipment used at home: wheelchair.  DME owned (not currently used): none.  Upon discharge, patient will have assistance from staff.    Objective:     Communicated with nurse prior to session.  Patient found HOB elevated with central line, blood pressure cuff, Condom Catheter, pressure relief boots, peripheral IV, telemetry  upon PT entry to room.    General Precautions: Standard, fall  Orthopedic Precautions:LLE non weight bearing, RLE partial weight bearing (RLE partial WB through heel)   Braces:  (R darco shoe)  Respiratory Status: Room air    Exams:  Cognitive Exam:  Patient is oriented to Person, Place, and Situation  Sensation:    -       Impaired  light/touch decreased in L distal residual limb & R foot   Skin Integrity/Edema:      -       Skin integrity: Dry  RLE ROM: WFL  RLE Strength: WFL  LLE ROM: WFL  LLE Strength: WFL    Functional Mobility:  Bed Mobility:     Scooting along EOB to L: stand by assistance  Supine to Sit: contact guard assistance  Sit to Supine: contact guard assistance  Balance:   Static/Dynamic Sitting: Good, Supervision-SBA  Transfers: unable to perform 2/2 no darco shoe in room at time of eval. MD notified.       AM-PAC 6 CLICK MOBILITY  Total Score:11       Treatment & Education:  Sitting EOB [id number]  Performed grooming with OT  Patient educated on role of therapy, goals of session, and benefits of mobilizing.   Discussed PT plan of care during hospitalization.   Patient educated on calling for assistance.   All questions answered within PT scope of  practice.     Patient left HOB elevated with all lines intact, call button in reach, and nurse notified.    GOALS:   Multidisciplinary Problems       Physical Therapy Goals          Problem: Physical Therapy    Goal Priority Disciplines Outcome Goal Variances Interventions   Physical Therapy Goal     PT, PT/OT Ongoing, Progressing     Description: Goals to be met by: 2023    Patient will increase functional independence with mobility by performin. Supine to sit with Juliette  2. Bed to chair transfer with Contact Guard Assistance using LRAD  3. Wheelchair propulsion x100 feet with Supervision  4. Lower extremity exercise program x10 reps per handout, with independence                         History:     Past Medical History:   Diagnosis Date    Arthritis     legs    Diabetes mellitus     Diabetes mellitus, type 2     Hyperlipidemia     Hypertension     Osteomyelitis     Palliative care encounter 2023       Past Surgical History:   Procedure Laterality Date    ANGIOGRAPHY OF LOWER EXTREMITY N/A 2/3/2021    Procedure: Angiogram Extremity Bilateral;  Surgeon: Ernst Chacko MD;  Location: [facility] OR [address] [place] [address];  Service: Peripheral Vascular;  Laterality: N/A;  7.4 mintues fluoroscopy time  816.15 mGy  170.17 Gycm2    AORTOGRAPHY WITH EXTREMITY RUNOFF Bilateral 2/3/2021    Procedure: AORTOGRAM, WITH EXTREMITY RUNOFF;  Surgeon: Ernst Chacko MD;  Location: [facility] OR [address] [place] [address];  Service: Peripheral Vascular;  Laterality: Bilateral;    DEBRIDEMENT OF FOOT Left 2021    Procedure: DEBRIDEMENT, LEFT HEEL;  Surgeon: Mayra Schroeder DPM;  Location: [facility] OR [address] [place] [address];  Service: Podiatry;  Laterality: Left;    FOOT AMPUTATION  2010    left high midfoot amputation       Time Tracking:     PT Received On: 10/05/23  PT Start Time: 957     PT Stop Time: 2  PT Total Time (min): 25 min     Billable Minutes: Evaluation 10 and Therapeutic Activity 15      10/05/2023

## 2023-10-05 NOTE — PLAN OF CARE
Report given to RN for [id number]. VSS, afeb, RA, voids, no BM this shift. Tylenol/Gabapentin started for pain. Wound care dressed wounds this shift. All belongings taken with patient.      Problem: Adult Inpatient Plan of Care  Goal: Plan of Care Review  Outcome: Ongoing, Progressing  Goal: Patient-Specific Goal (Individualized)  Outcome: Ongoing, Progressing  Goal: Absence of Hospital-Acquired Illness or Injury  Outcome: Ongoing, Progressing  Goal: Optimal Comfort and Wellbeing  Outcome: Ongoing, Progressing  Goal: Readiness for Transition of Care  Outcome: Ongoing, Progressing     Problem: Diabetes Comorbidity  Goal: Blood Glucose Level Within Targeted Range  Outcome: Ongoing, Progressing     Problem: Adjustment to Illness (Sepsis/Septic Shock)  Goal: Optimal Coping  Outcome: Ongoing, Progressing     Problem: Bleeding (Sepsis/Septic Shock)  Goal: Absence of Bleeding  Outcome: Ongoing, Progressing     Problem: Glycemic Control Impaired (Sepsis/Septic Shock)  Goal: Blood Glucose Level Within Desired Range  Outcome: Ongoing, Progressing     Problem: Infection Progression (Sepsis/Septic Shock)  Goal: Absence of Infection Signs and Symptoms  Outcome: Ongoing, Progressing     Problem: Nutrition Impaired (Sepsis/Septic Shock)  Goal: Optimal Nutrition Intake  Outcome: Ongoing, Progressing     Problem: Infection  Goal: Absence of Infection Signs and Symptoms  Outcome: Ongoing, Progressing     Problem: Skin Injury Risk Increased  Goal: Skin Health and Integrity  Outcome: Ongoing, Progressing     Problem: Impaired Wound Healing  Goal: Optimal Wound Healing  Outcome: Ongoing, Progressing     Problem: Fluid and Electrolyte Imbalance (Acute Kidney Injury/Impairment)  Goal: Fluid and Electrolyte Balance  Outcome: Ongoing, Progressing     Problem: Oral Intake Inadequate (Acute Kidney Injury/Impairment)  Goal: Optimal Nutrition Intake  Outcome: Ongoing, Progressing     Problem: Renal Function Impairment (Acute Kidney  Injury/Impairment)  Goal: Effective Renal Function  Outcome: Ongoing, Progressing

## 2023-10-05 NOTE — ASSESSMENT & PLAN NOTE
73-year-old male with history of afib, T2DM, PVD s/p left foot amputation and chronic OM on IV abx (linezolid, cefepime, metronidzole) at NH presented with weakness found to have sepsis secondary to ESBL Klebsiella and  Acinetobacter bacteremia. likely source includes PICC line - also consider from left heel wound. PICC line removed 10/4/2023.    Recommendations:  1. Discontinue zosyn, start cefidericol 1gq8hr and fluconazole today  2. Follow up repeat blood cultures  3. Podiatry following, no acute intervention, MARIO studies unable to obtain, recommend further imaging and would consider vascular surgery evaluation pending vascular studies    4. Follow up wound cultures

## 2023-10-05 NOTE — NURSING
Nurses Note -- 4 Eyes      10/4/2023   7:25 PM      Skin assessed during: Transfer      [] No Altered Skin Integrity Present    []Prevention Measures Documented      [x] Yes- Altered Skin Integrity Present or Discovered   [x] LDA Added if Not in Epic (Describe Wound)   [x] New Altered Skin Integrity was Present on Admit and Documented in LDA   [] Wound Image Taken    Wound Care Consulted? Yes    Attending Nurse:  Sunita Rehman RN/Staff Member:  Roselyn

## 2023-10-05 NOTE — PT/OT/SLP EVAL
Occupational Therapy   Co-Evaluation/tx    Name: Saji Castañeda  MRN: [id number]  Admitting Diagnosis: Bacteremia due to Gram-negative bacteria  Recent Surgery: * No surgery found *    Co-eval performed to ensure pt. Safety and accommodate for pt. Activity tolerance  Recommendations:     Discharge Recommendations: nursing facility, skilled  Discharge Equipment Recommendations:   (TBD)  Barriers to discharge:  None    Assessment:     Saji Castañeda is a 73 y.o. male with a medical diagnosis of Bacteremia due to Gram-negative bacteria.  He presents with deficits in mobility as well as self-care tasks. Pt. Tolerated session well on this date with good participation noted. Patient would benefit from continued OT services to maximize level of safety and independence with self-care tasks.   . Performance deficits affecting function: weakness, impaired endurance, impaired self care skills, impaired functional mobility, orthopedic precautions, decreased lower extremity function, impaired skin, impaired cardiopulmonary response to activity.      Rehab Prognosis: Good; patient would benefit from acute skilled OT services to address these deficits and reach maximum level of function.       Plan:     Patient to be seen 3 x/week to address the above listed problems via self-care/home management, therapeutic activities, therapeutic exercises, neuromuscular re-education  Plan of Care Expires: 11/04/23  Plan of Care Reviewed with: patient    Subjective     Chief Complaint: Pt. Reported feeling itchy (skin very dry)  Patient/Family Comments/goals: No specific goals stated    Occupational Profile:  Living Environment: Pt. Has been  at [facility] prior to [facility] :Living Environment: pt lives with his brother and sister in [facility] - has 3 LON with BHR to enter - has t/s combo for bathing but takes sponge baths at sink while seated in w/c  Previous level of function: per pt, he was mod I with ADLS and func mobility -  however, not following WB precautions     Pain/Comfort:  Pain Rating 1: 0/10  Pain Rating Post-Intervention 1: 0/10    Patients cultural, spiritual, Voodoo conflicts given the current situation: no    Objective:     Communicated with: nurse prior to session.  Patient found supine with telemetry, Condom Catheter (swan) upon OT entry to room.    General Precautions: Standard, fall, special contact  Orthopedic Precautions: LLE non weight bearing, RLE partial weight bearing (in heel in darco shoe for transfers only)  Braces:  (Darco)  Respiratory Status: Room air    Occupational Performance:    Bed Mobility:    Patient completed Supine to Sit with contact guard assistance  Sit to supine: CGA    Functional Mobility/Transfers:  Unable to attempt on this date as Darco shoe not present (to be ordered by Dr. Hope with podiatry)  Functional Mobility: not tested    Activities of Daily Living:  Grooming: supervision seated EOB  Upper Body Dressing: set up A seated EOB      Cognitive/Visual Perceptual:  Cognitive/Psychosocial Skills:     -       Oriented to: Person, Place, Time, and Situation   -       Follows Commands/attention:Follows multistep  commands  -       Communication: clear/fluent  -       Memory: No Deficits noted  -       Safety awareness/insight to disability: intact   -       Mood/Affect/Coping skills/emotional control: Appropriate to situation  Visual/Perceptual:      -Intact .    Physical Exam:  Balance: -       sit: good  Postural examination/scapula alignment: -       Rounded shoulders  -       Posterior pelvic tilt  Skin integrity: Thin, Dry, and skin sloughing noted in BLE  Dominant hand: -       right  Upper Extremity Range of Motion:     -       Right Upper Extremity: WFL  -       Left Upper Extremity: WFL   Strength:    -       Right Upper Extremity: WFL  -       Left Upper Extremity: WFL    AMPAC 6 Click ADL:  AMPAC Total Score: 17    Treatment & Education:  Pt. Educated on role of OT and  POC  Pt. Educated on safety with mobility and sitting EOB      Patient left supine with all lines intact and call button in reach    GOALS:   Multidisciplinary Problems       Occupational Therapy Goals          Problem: Occupational Therapy    Goal Priority Disciplines Outcome Interventions   Occupational Therapy Goal     OT, PT/OT Ongoing, Progressing    Description: Goals to be met by: 10-15-23     Patient will increase functional independence with ADLs by performing:    UE Dressing with Set-up Assistance.  LE Dressing with Supervision in supine  Grooming while seated with Set-up Assistance.  Toileting from drop arm commode with Minimal Assistance for hygiene and clothing management.   Supine to sit with Supervision.  Squat pivot transfers with Min A in darco shoe with WB through R heel only in darco  Toilet transfer to drop arm commode with Minimal Assistance.  Pt. To be I with HEP in BUE to improve level of endurance                         History:     Past Medical History:   Diagnosis Date    Arthritis     legs    Diabetes mellitus     Diabetes mellitus, type 2     Hyperlipidemia     Hypertension     Osteomyelitis     Palliative care encounter 5/24/2023         Past Surgical History:   Procedure Laterality Date    ANGIOGRAPHY OF LOWER EXTREMITY N/A 2/3/2021    Procedure: Angiogram Extremity Bilateral;  Surgeon: Ernst Chacko MD;  Location: [facility] OR [address] [place] [address];  Service: Peripheral Vascular;  Laterality: N/A;  7.4 mintues fluoroscopy time  816.15 mGy  170.17 Gycm2    AORTOGRAPHY WITH EXTREMITY RUNOFF Bilateral 2/3/2021    Procedure: AORTOGRAM, WITH EXTREMITY RUNOFF;  Surgeon: Ernst Chacko MD;  Location: [facility] OR [address] [place] [address];  Service: Peripheral Vascular;  Laterality: Bilateral;    DEBRIDEMENT OF FOOT Left 2/23/2021    Procedure: DEBRIDEMENT, LEFT HEEL;  Surgeon: Mayra Schroeder DPM;  Location: [facility] OR [address] [place] [address];  Service: Podiatry;  Laterality: Left;    FOOT AMPUTATION  October 2010    left high  midfoot amputation       Time Tracking:     OT Date of Treatment: 10/05/23  OT Start Time: 0957  OT Stop Time: 1022  OT Total Time (min): 25 min    Billable Minutes:Evaluation 15  Self Care/Home Management 10    10/5/2023

## 2023-10-05 NOTE — SUBJECTIVE & OBJECTIVE
Interval History: Stepdown from Cardiac ICU. Still has a right IJ central line. Per transferring team, no indication to continue having it outside the ICU.    Review of Systems   Constitutional:  Negative for chills and fever.   Neurological:  Negative for seizures and syncope.     Objective:     Vital Signs (Most Recent):  Temp: 98.1 °F (36.7 °C) (10/05/23 0745)  Pulse: (!) 45 (10/05/23 0745)  Resp: 19 (10/05/23 0745)  BP: 139/60 (10/05/23 0745)  SpO2: 96 % (10/05/23 0745) Vital Signs (24h Range):  Temp:  [98 °F (36.7 °C)-98.3 °F (36.8 °C)] 98.1 °F (36.7 °C)  Pulse:  [45-90] 45  Resp:  [13-29] 19  SpO2:  [94 %-99 %] 96 %  BP: (127-160)/(58-71) 139/60     Weight: 112.5 kg (248 lb)  Body mass index is 36.62 kg/m².    Intake/Output Summary (Last 24 hours) at 10/5/2023 0905  Last data filed at 10/4/2023 1801  Gross per 24 hour   Intake 857.86 ml   Output 250 ml   Net 607.86 ml         Physical Exam  Vitals and nursing note reviewed.   Constitutional:       General: He is not in acute distress.     Appearance: He is well-developed. He is obese. He is not diaphoretic.   Skin:     General: Skin is warm and dry.      Coloration: Skin is not jaundiced or pale.   Neurological:      Mental Status: He is alert and oriented to person, place, and time. Mental status is at baseline.      Motor: No seizure activity.   Psychiatric:         Attention and Perception: Attention normal.         Mood and Affect: Affect normal.         Behavior: Behavior is not aggressive or combative.             Significant Labs: All pertinent labs within the past 24 hours have been reviewed.    Significant Imaging: I have reviewed all pertinent imaging results/findings within the past 24 hours.  X-Ray Chest 1 View 10/02/23: FINDINGS:   There is a left-sided PICC line in place.  The distal component of the catheter is difficult to identified.  There appears to have been retraction into the left brachiocephalic vein.  Monitoring EKG leads are present.    The trachea is unremarkable.  There are calcifications of the aortic knob.  The cardiomediastinal silhouette is within normal limits.  There is no evidence of free air beneath the hemidiaphragms.  There are no pleural effusions.  There is no evidence of a pneumothorax.  There is no evidence of pneumomediastinum.  There are bilateral interstitial opacities.  There is no focal consolidation.  There are degenerative changes in the osseous structures.   Impression:  1.  Interval retraction of the left-sided PICC line with the tip in the left brachiocephalic vein.   2.  Cardiomegaly with mild bilateral interstitial opacities.  No focal consolidation.   X-Ray Chest AP Portable 10/02/23: FINDINGS:   Right central venous catheter tip projects at the level of the jugular/SVC junction.  Left PICC catheter tip projects in the region of the brachiocephalic.  There is no pneumothorax or significant interval detrimental change otherwise in the cardiopulmonary status since the previous exam.   CT Head Without Contrast 10/03/23: FINDINGS:   There is posterior right scalp soft tissue induration.  There is no acute intracranial hemorrhage, hydrocephalus, midline shift or mass effect. There is generalized cerebral volume loss compensatory prominence of cerebral sulci and the ventricular system.  Gray-white matter differentiation appears maintained with microangiopathic change.  If there is clinical concern for acute ischemia, further assessment with MRI is advised if there are no clinical contraindications.  Basal cisterns are patent.  There is mucosal thickening and fluid/aerated secretions in the left maxillary sinus.  Correlation for acute sinusitis advised.  Remaining paranasal sinuses and mastoid air cells are clear.  The visualized bones of the calvarium demonstrate no acute osseous abnormality.   Impression:  1. No CT evidence of acute intracranial abnormality.  If there is clinical concern for acute ischemia, further  evaluation with MRI is advised if there are no clinical contraindications.   2. Generalized cerebral volume loss and microangiopathic change.   3. Findings concerning for acute left maxillary sinusitis.  Clinical correlation advised.   MRI Brain Without Contrast 10/03/23: FINDINGS:   Study distorted by patient motion.  Within limits of the study there is no restricted diffusion to suggest acute or recent infarction.  Generalized cerebral volume loss with compensatory enlargement ventricles sulci and cisterns similar to prior without evidence for hydrocephalus   Major intracranial skull base T2 flow voids are present.  There is slight hypoplasia of the posterior circulation.  Punctate probable encephalomalacia left inferior cerebellum suggestive for remote infarction.  Few punctate foci of T2 FLAIR signal hyperintensities within the supratentorial white matter which are nonspecific and may represent mild chronic ischemic change   Mild moderate probable mucosal thickening left maxillary antra.  There is no abnormal parenchymal susceptibility to suggest parenchymal hemorrhage.   Incidental heterogeneous signal within the right parietooccipital scalp soft tissues which may be sequela of prior traumatic injury.   Impression:  Cerebral volume loss with few punctate foci of T2 FLAIR signal abnormality supratentorial white matter which are nonspecific and may represent mild chronic ischemic change.   Punctate probable remote left cerebellar lacunar type infarct   No evidence for acute infarction otherwise unremarkable motion distorted noncontrast MRI brain specifically without evidence for acute infarction.   Transthoracic echo complete with contrast 10/03/23:     Left Ventricle: The left ventricle is normal in size. Ventricular mass is normal. Increased wall thickness. There is concentric remodeling. Normal wall motion. There is normal systolic function with a visually estimated ejection fraction of 60 - 65%. Ejection  fraction by visual approximation is 65%. There is normal diastolic function.    Right Ventricle: Normal right ventricular cavity size. Wall thickness is normal. Right ventricle wall motion  is normal. Systolic function is normal.    Aortic Valve: There is aortic valve sclerosis.    Mitral Valve: There is mild mitral annular calcification present.    IVC/SVC: Normal venous pressure at 3 mmHg.  X-Ray Tibia Fibula Bilateral 10/03/23: FINDINGS:   There is osteopenia.  There are vascular calcifications.   Right tibia/fibula: No acute fracture or dislocation.  Alignment is normal.  There is chronic periosteal reaction about the fibular shaft with ossification of the interosseous membrane.  Suspected remote fracture of mid fibular diaphysis.  There is soft tissue edema.  There are vascular calcifications.   Left tibia/fibula: No acute fracture or dislocation.  Alignment is normal.  There is chronic periosteal reaction about the fibular shaft and distal metaphysis with ossification of the interosseous membrane.  There is soft tissue edema.   X-Ray Foot Complete Right 10/03/23: FINDINGS:   There is osteopenia.  There is no evidence of acute osteomyelitis.  There is a chronic deformity of the 5th metatarsal, stable.  Scattered joint space narrowing.  There are vascular calcifications.  There is soft tissue edema.   X-Ray Ankle Complete Left 10/03/23: FINDINGS:   There are postoperative changes of left foot amputation.  There is a large plantar soft tissue ulcer of the heel with underlying erosive changes and sclerotic changes of the posterior aspect of the calcaneus, concerning for osteomyelitis.  There is also an ulcer of the amputation stump.  There are vascular calcifications.  There are foci of soft tissue gas.   Impression:  1. Acute osteomyelitis of the posterior aspect of the calcaneus.   2. Multiple soft tissue ulcers with soft tissue gas throughout the ankle and distal tib/fib.   US Retroperitoneal Complete 10/04/23:  FINDINGS:   Right kidney: The right kidney measures 12.1 cm. No cortical thinning. No loss of corticomedullary distinction. Resistive index measures 0.90, elevated (previously 0.73), mild decreased perfusion..  No mass. No renal stone. No hydronephrosis.   Left kidney: The left kidney measures 12.8 cm. No cortical thinning. No loss of corticomedullary distinction. Resistive index measures 0.94, elevated (previously 0.76).  Mild decreased perfusion.  There is a simple cyst at the upper pole measuring 1.9 cm.  No mass. No renal stone. No hydronephrosis.   Spleen resistive index 0.76.   The bladder is partially distended at the time of scanning and has an unremarkable appearance.   Impression:  There is increased bilateral kidney resistive indices worse from the prior study suggestive of acute on chronic medical kidney disease.   Mild decreased perfusion of the bilateral kidneys.   Benign left renal cyst.   VAS Ankle Brachial Indices Resting 10/04/23: Pressure Lower   ============   Rt brachial A syst BP  149 mmHg   Impression   =========   Technically difficult study due to thickness and hardness of skin. Doppler signal could not penetrate to get pressures. The PVR   waveforms suggests mild to minimal PAOD. More advanced imaging is suggested.

## 2023-10-05 NOTE — SUBJECTIVE & OBJECTIVE
Interval History:   NAEON  Blood cultures +ESBL Klebsiella and  Acinetobacter  Repeat blood cultures in process  Wound cultures + yeast and GNR  Pt remains stable, afebrile. No acute complaints or concerns     Review of Systems   Constitutional:  Negative for chills, diaphoresis, fatigue and fever.   HENT:  Negative for congestion, ear pain, nosebleeds, rhinorrhea and sore throat.    Eyes:  Negative for pain.   Respiratory:  Negative for cough, shortness of breath and wheezing.    Cardiovascular:  Negative for chest pain and leg swelling.   Gastrointestinal:  Negative for abdominal pain, constipation, diarrhea, nausea and vomiting.   Genitourinary:  Negative for dysuria, frequency and urgency.   Musculoskeletal:  Negative for arthralgias, back pain and neck pain.   Skin:  Positive for wound.   Neurological:  Negative for weakness, numbness and headaches.     Objective:     Vital Signs (Most Recent):  Temp: 98.1 °F (36.7 °C) (10/05/23 1220)  Pulse: (!) 50 (10/05/23 1220)  Resp: 17 (10/05/23 1220)  BP: 139/63 (10/05/23 1220)  SpO2: 95 % (10/05/23 1220) Vital Signs (24h Range):  Temp:  [98 °F (36.7 °C)-98.3 °F (36.8 °C)] 98.1 °F (36.7 °C)  Pulse:  [45-90] 50  Resp:  [13-29] 17  SpO2:  [94 %-98 %] 95 %  BP: (127-160)/(58-71) 139/63     Weight: 112.5 kg (248 lb)  Body mass index is 36.62 kg/m².    Estimated Creatinine Clearance: 21.7 mL/min (A) (based on SCr of 3.7 mg/dL (H)).     Physical Exam  Vitals and nursing note reviewed.   Constitutional:       General: He is not in acute distress.     Appearance: He is well-developed. He is not diaphoretic.   HENT:      Head: Normocephalic and atraumatic.   Eyes:      Pupils: Pupils are equal, round, and reactive to light.   Cardiovascular:      Rate and Rhythm: Normal rate and regular rhythm.      Heart sounds: Normal heart sounds. No murmur heard.     No friction rub. No gallop.   Pulmonary:      Effort: Pulmonary effort is normal. No respiratory distress.      Breath  sounds: Normal breath sounds. No wheezing or rales.   Chest:      Chest wall: No tenderness.   Abdominal:      General: Bowel sounds are normal. There is no distension.      Palpations: There is no mass.      Tenderness: There is no abdominal tenderness. There is no guarding.   Musculoskeletal:         General: No deformity. Normal range of motion.      Cervical back: Normal range of motion and neck supple.   Skin:     General: Skin is warm and dry.      Findings: No erythema or rash.      Comments: RIJ line in place c/d/I  Picc line removed    Bilateral heel wounds dressed. Refer to media    Neurological:      Mental Status: He is alert and oriented to person, place, and time.   Psychiatric:         Behavior: Behavior normal.         Thought Content: Thought content normal.         Judgment: Judgment normal.          Significant Labs: All pertinent labs within the past 24 hours have been reviewed.    Significant Imaging: I have reviewed all pertinent imaging results/findings within the past 24 hours.

## 2023-10-05 NOTE — PLAN OF CARE
Problem: Occupational Therapy  Goal: Occupational Therapy Goal  Description: Goals to be met by: 10-15-23     Patient will increase functional independence with ADLs by performing:    UE Dressing with Set-up Assistance.  LE Dressing with Supervision in supine  Grooming while seated with Set-up Assistance.  Toileting from drop arm commode with Minimal Assistance for hygiene and clothing management.   Supine to sit with Supervision.  Squat pivot transfers with Min A in darco shoe with WB through R heel only in darco  Toilet transfer to drop arm commode with Minimal Assistance.  Pt. To be I with HEP in BUE to improve level of endurance    Outcome: Ongoing, Progressing

## 2023-10-06 LAB
BACTERIA BLD CULT: ABNORMAL
BASOPHILS # BLD AUTO: 0.04 K/UL (ref 0–0.2)
BASOPHILS NFR BLD: 0.4 % (ref 0–1.9)
DIFFERENTIAL METHOD: ABNORMAL
EOSINOPHIL # BLD AUTO: 1.4 K/UL (ref 0–0.5)
EOSINOPHIL NFR BLD: 13.7 % (ref 0–8)
ERYTHROCYTE [DISTWIDTH] IN BLOOD BY AUTOMATED COUNT: 17.9 % (ref 11.5–14.5)
HCT VFR BLD AUTO: 27.8 % (ref 40–54)
HGB BLD-MCNC: 8.6 G/DL (ref 14–18)
IMM GRANULOCYTES # BLD AUTO: 0.08 K/UL (ref 0–0.04)
IMM GRANULOCYTES NFR BLD AUTO: 0.8 % (ref 0–0.5)
LYMPHOCYTES # BLD AUTO: 1.2 K/UL (ref 1–4.8)
LYMPHOCYTES NFR BLD: 12 % (ref 18–48)
MCH RBC QN AUTO: 27.4 PG (ref 27–31)
MCHC RBC AUTO-ENTMCNC: 30.9 G/DL (ref 32–36)
MCV RBC AUTO: 89 FL (ref 82–98)
MONOCYTES # BLD AUTO: 1.1 K/UL (ref 0.3–1)
MONOCYTES NFR BLD: 10.5 % (ref 4–15)
NEUTROPHILS # BLD AUTO: 6.3 K/UL (ref 1.8–7.7)
NEUTROPHILS NFR BLD: 62.6 % (ref 38–73)
NRBC BLD-RTO: 0 /100 WBC
PLATELET # BLD AUTO: 189 K/UL (ref 150–450)
PMV BLD AUTO: 10.6 FL (ref 9.2–12.9)
POCT GLUCOSE: 184 MG/DL (ref 70–110)
POCT GLUCOSE: 188 MG/DL (ref 70–110)
POCT GLUCOSE: 213 MG/DL (ref 70–110)
POCT GLUCOSE: 238 MG/DL (ref 70–110)
RBC # BLD AUTO: 3.14 M/UL (ref 4.6–6.2)
WBC # BLD AUTO: 10.13 K/UL (ref 3.9–12.7)

## 2023-10-06 PROCEDURE — 63600175 PHARM REV CODE 636 W HCPCS: Mod: JZ,TB | Performed by: PHYSICIAN ASSISTANT

## 2023-10-06 PROCEDURE — 99233 PR SUBSEQUENT HOSPITAL CARE,LEVL III: ICD-10-PCS | Mod: ,,, | Performed by: HOSPITALIST

## 2023-10-06 PROCEDURE — 25000003 PHARM REV CODE 250: Performed by: HOSPITALIST

## 2023-10-06 PROCEDURE — 97535 SELF CARE MNGMENT TRAINING: CPT

## 2023-10-06 PROCEDURE — 25000003 PHARM REV CODE 250

## 2023-10-06 PROCEDURE — 99233 PR SUBSEQUENT HOSPITAL CARE,LEVL III: ICD-10-PCS | Mod: ,,, | Performed by: PHYSICIAN ASSISTANT

## 2023-10-06 PROCEDURE — 21400001 HC TELEMETRY ROOM

## 2023-10-06 PROCEDURE — 36415 COLL VENOUS BLD VENIPUNCTURE: CPT | Performed by: INTERNAL MEDICINE

## 2023-10-06 PROCEDURE — 25000003 PHARM REV CODE 250: Performed by: PHYSICIAN ASSISTANT

## 2023-10-06 PROCEDURE — 87040 BLOOD CULTURE FOR BACTERIA: CPT | Mod: 59 | Performed by: PHYSICIAN ASSISTANT

## 2023-10-06 PROCEDURE — 99233 SBSQ HOSP IP/OBS HIGH 50: CPT | Mod: ,,, | Performed by: HOSPITALIST

## 2023-10-06 PROCEDURE — 25000003 PHARM REV CODE 250: Performed by: INTERNAL MEDICINE

## 2023-10-06 PROCEDURE — 85025 COMPLETE CBC W/AUTO DIFF WBC: CPT | Performed by: INTERNAL MEDICINE

## 2023-10-06 PROCEDURE — 92507 TX SP LANG VOICE COMM INDIV: CPT

## 2023-10-06 PROCEDURE — 27000207 HC ISOLATION

## 2023-10-06 PROCEDURE — 36415 COLL VENOUS BLD VENIPUNCTURE: CPT | Performed by: PHYSICIAN ASSISTANT

## 2023-10-06 PROCEDURE — 99233 SBSQ HOSP IP/OBS HIGH 50: CPT | Mod: ,,, | Performed by: PHYSICIAN ASSISTANT

## 2023-10-06 RX ADMIN — FLUCONAZOLE 200 MG: 200 TABLET ORAL at 08:10

## 2023-10-06 RX ADMIN — OXYCODONE HYDROCHLORIDE AND ACETAMINOPHEN 500 MG: 500 TABLET ORAL at 08:10

## 2023-10-06 RX ADMIN — PANTOPRAZOLE SODIUM 40 MG: 40 TABLET, DELAYED RELEASE ORAL at 08:10

## 2023-10-06 RX ADMIN — ATORVASTATIN CALCIUM 80 MG: 40 TABLET, FILM COATED ORAL at 08:10

## 2023-10-06 RX ADMIN — INSULIN ASPART 2 UNITS: 100 INJECTION, SOLUTION INTRAVENOUS; SUBCUTANEOUS at 09:10

## 2023-10-06 RX ADMIN — INSULIN ASPART 5 UNITS: 100 INJECTION, SOLUTION INTRAVENOUS; SUBCUTANEOUS at 01:10

## 2023-10-06 RX ADMIN — GABAPENTIN 100 MG: 100 CAPSULE ORAL at 02:10

## 2023-10-06 RX ADMIN — CLOPIDOGREL BISULFATE 75 MG: 75 TABLET ORAL at 08:10

## 2023-10-06 RX ADMIN — CEFIDEROCOL SULFATE TOSYLATE 1 G: 1 INJECTION, POWDER, FOR SOLUTION INTRAVENOUS at 10:10

## 2023-10-06 RX ADMIN — TAMSULOSIN HYDROCHLORIDE 0.4 MG: 0.4 CAPSULE ORAL at 08:10

## 2023-10-06 RX ADMIN — HYDRALAZINE HYDROCHLORIDE 100 MG: 50 TABLET ORAL at 05:10

## 2023-10-06 RX ADMIN — APIXABAN 5 MG: 5 TABLET, FILM COATED ORAL at 08:10

## 2023-10-06 RX ADMIN — GABAPENTIN 100 MG: 100 CAPSULE ORAL at 08:10

## 2023-10-06 RX ADMIN — INSULIN ASPART 5 UNITS: 100 INJECTION, SOLUTION INTRAVENOUS; SUBCUTANEOUS at 06:10

## 2023-10-06 RX ADMIN — CEFIDEROCOL SULFATE TOSYLATE 1 G: 1 INJECTION, POWDER, FOR SOLUTION INTRAVENOUS at 02:10

## 2023-10-06 RX ADMIN — ACETAMINOPHEN 650 MG: 325 TABLET ORAL at 01:10

## 2023-10-06 RX ADMIN — ACETAMINOPHEN 650 MG: 325 TABLET ORAL at 05:10

## 2023-10-06 RX ADMIN — MICONAZOLE NITRATE: 20 POWDER TOPICAL at 08:10

## 2023-10-06 RX ADMIN — INSULIN ASPART 5 UNITS: 100 INJECTION, SOLUTION INTRAVENOUS; SUBCUTANEOUS at 09:10

## 2023-10-06 RX ADMIN — HYDRALAZINE HYDROCHLORIDE 100 MG: 50 TABLET ORAL at 01:10

## 2023-10-06 RX ADMIN — HYDRALAZINE HYDROCHLORIDE 100 MG: 50 TABLET ORAL at 10:10

## 2023-10-06 RX ADMIN — THERA TABS 1 TABLET: TAB at 08:10

## 2023-10-06 RX ADMIN — INSULIN DETEMIR 10 UNITS: 100 INJECTION, SOLUTION SUBCUTANEOUS at 08:10

## 2023-10-06 RX ADMIN — CEFIDEROCOL SULFATE TOSYLATE 1 G: 1 INJECTION, POWDER, FOR SOLUTION INTRAVENOUS at 06:10

## 2023-10-06 RX ADMIN — INSULIN ASPART 2 UNITS: 100 INJECTION, SOLUTION INTRAVENOUS; SUBCUTANEOUS at 01:10

## 2023-10-06 NOTE — PROGRESS NOTES
[facility] - Intensive Care ([address] Medicine  Progress Note    Patient Name: Saji Castañeda  MRN: [id number]  Patient Class: IP- Inpatient   Admission Date: 10/2/2023  Length of Stay: 4 days  Attending Physician: Milton Shelton MD  Primary Care Provider: [facility] -        Subjective:     Principal Problem:Bacteremia due to Gram-negative bacteria        HPI:  Saji Castañeda is a 73 year old Black man with hypertension, hyperlipidemia, diabetes mellitus type 2 with neuropathy, left ventricular concentric hypertrophy, paroxysmal atrial fibrillation (anticoagulated on apixaban), Mobitz type I atrioventricular heart block, chronic kidney disease stage 3, chronic venous hypertension, peripheral vascular disease, 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 He lives in [place], [place].               He presented to [facility] on 10/2/2023 from [facility] due to concerns of weakness and feeling like he was going to die. He reported no dizziness or headaches. He knew his name, where he was, and the current year but had trouble remembering which nursing home he came from. Blood glucose was in the 20s. He was given dextrose in the emergency department and glucose improved to 96 mg/dL. Labs showed acute kidney injury with creatinine of 2.6 mg/dL from baseline of 1.3 and lactic acid elevated at 2.29 mmol/L. He had complete heart block. He was admitted to Cardiology.      Overview/Hospital Course:  Right internal jugular central venous catheter was placed. He was found to have acute osteomyelitis of the posterior aspect of the calcaneus. He had hypotension concerning for septic shock. He was started on dobutamine, vancomycin, and piperacillin-tazobactam. Blood  culture grew Gram negative rods. PCR was positive for Acinetobacter and Klebsiella pneumoniae. He had aphasia. Head CT and brain MRI showed no acute stroke. Infectious Disease was consulted as he had been on linezolid, cefepime, and metronidazole for osteomyelitis. PICC line was removed and IV access was replaced in anticipation for long-term IV antibiotics. Podiatry was consulted and recommended X-rays and Wound Care consult. Wound was cultured. He had acute on chronic renal failure. Renal ultrasound showed no obstruction. He was given fluid resuscitation. Heart rate was able to augment appropriately with exertion with transmission of P waves. Repeat EKG showed 2nd degree AV block replacing junctional rhythm. Electrophysiology recommended pacemaker placement. Vascular Surgery suspected poor wound healing to be due to severe edema. They ordered ankle brachial indices but it was unable to be done due to the hardness of his skin. They did not recommend amputation based on their physical exam findings. He was transferred to Hospital Medicine Team D on 10/5/2023. Infectious Disease changed piperacillin-tazobactam to cefidericol and fluconazole.       Interval History: ID recommends alternative imaging due to inability to get MARIO.    Review of Systems   Constitutional:  Negative for chills and fever.   Neurological:  Negative for seizures and syncope.     Objective:     Vital Signs (Most Recent):  Temp: 98.4 °F (36.9 °C) (10/06/23 1125)  Pulse: (!) 41 (10/06/23 1140)  Resp: 17 (10/06/23 1125)  BP: (!) 157/65 (10/06/23 1125)  SpO2: 98 % (10/06/23 1125) Vital Signs (24h Range):  Temp:  [98 °F (36.7 °C)-99.1 °F (37.3 °C)] 98.4 °F (36.9 °C)  Pulse:  [41-89] 41  Resp:  [16-19] 17  SpO2:  [93 %-100 %] 98 %  BP: (124-159)/(57-80) 157/65     Weight: 112.5 kg (248 lb)  Body mass index is 36.62 kg/m².  No intake or output data in the 24 hours ending 10/06/23 1355        Physical Exam  Vitals and nursing note reviewed.    Constitutional:       General: He is not in acute distress.     Appearance: He is well-developed. He is obese. He is not diaphoretic.   Musculoskeletal:      Right lower leg: Edema present.      Left lower leg: Edema present.   Skin:     General: Skin is warm and dry.      Coloration: Skin is not jaundiced or pale.   Neurological:      Mental Status: He is alert and oriented to person, place, and time. Mental status is at baseline.      Motor: No seizure activity.   Psychiatric:         Attention and Perception: Attention normal.         Mood and Affect: Affect normal.         Behavior: Behavior is not aggressive or combative.             Significant Labs: All pertinent labs within the past 24 hours have been reviewed.    Significant Imaging: I have reviewed all pertinent imaging results/findings within the past 24 hours.      Assessment/Plan:      * Bacteremia due to Gram-negative bacteria  On cefidericol and fluconazole. Appreciate Infectious Disease.    History of complete heart block  AV block, 2nd degree  Electrophysiology recommends pacemaker.    Paroxysmal atrial fibrillation  Continue home apixaban.     Nonhealing ulcer of right lower leg with fat layer exposed  Edema of leg  Vascular Surgery believes edema contributes to poor wound healing. Wound care. Arterial ultrasound.    Chronic deep vein thrombosis (DVT) of right upper extremity  Continue home apixaban.    Peripheral arterial disease  Continue home clopidrogel and atorvastatin. Evaluated by Interventional Cardiology in May and deemed not to be a revascularization candidate due to extensive disease, multiple comorbidities, debility/bed bound status and concern for noncompliance.    Acute on chronic renal failure  Monitor labs. Creatinine increasing.    Insulin dependent type 2 diabetes mellitus  Holding home glipizide. He takes insulin glargine 45 units HS and insulin aspart 12 units TID. Giving insulin aspart 5 units TID and sliding scale. Add  insulin detemir 10 units HS. Monitor Accuchecks.      VTE Risk Mitigation (From admission, onward)         Ordered     apixaban tablet 5 mg  2 times daily         10/02/23 1544     IP VTE HIGH RISK PATIENT  Once         10/02/23 1513     Place sequential compression device  Until discontinued         10/02/23 1513                Discharge Planning   OXANA: 10/10/2023     Code Status: Full Code   Is the patient medically ready for discharge?:     Reason for patient still in hospital (select all that apply): Laboratory test, Treatment, Imaging and Consult recommendations  Discharge Plan A: Home with family, Home Health                  Milton Shelton MD  Department of Hospital Medicine   [facility] - Intensive Care ([place]-16)

## 2023-10-06 NOTE — ASSESSMENT & PLAN NOTE
73-year-old male with history of afib, T2DM, PVD s/p left foot amputation and chronic OM on IV abx (linezolid, cefepime, metronidzole) at NH presented with weakness found to have sepsis secondary to ESBL Klebsiella and  Acinetobacter bacteremia. likely source includes PICC line - also consider from left heel wound. PICC line removed 10/4/2023. Repeat blood cultures today.     Recommendations:  1. Continue cefidericol 1gq8hr and fluconazole today  2. Follow up repeat blood cultures today   3. Podiatry following, no acute intervention, MARIO studies unable to obtain, recommend further imaging (CTA?) and would consider vascular surgery evaluation pending vascular studies  4. Follow up wound cultures

## 2023-10-06 NOTE — SUBJECTIVE & OBJECTIVE
Interval History:   NAEON  Blood cultures +ESBL Klebsiella and  Acinetobacter  Wound cultures + yeast and GNR  Pt remains stable, afebrile. No acute complaints or concerns   Repeat blood cultures today     Review of Systems   Constitutional:  Negative for chills, diaphoresis, fatigue and fever.   HENT:  Negative for congestion, ear pain, nosebleeds, rhinorrhea and sore throat.    Eyes:  Negative for pain.   Respiratory:  Negative for cough, shortness of breath and wheezing.    Cardiovascular:  Negative for chest pain and leg swelling.   Gastrointestinal:  Negative for abdominal pain, constipation, diarrhea, nausea and vomiting.   Genitourinary:  Negative for dysuria, frequency and urgency.   Musculoskeletal:  Negative for arthralgias, back pain and neck pain.   Skin:  Positive for wound.   Neurological:  Negative for weakness, numbness and headaches.     Objective:     Vital Signs (Most Recent):  Temp: 98.4 °F (36.9 °C) (10/06/23 1125)  Pulse: (!) 41 (10/06/23 1140)  Resp: 17 (10/06/23 1125)  BP: (!) 157/65 (10/06/23 1125)  SpO2: 98 % (10/06/23 1125) Vital Signs (24h Range):  Temp:  [98 °F (36.7 °C)-99.1 °F (37.3 °C)] 98.4 °F (36.9 °C)  Pulse:  [41-89] 41  Resp:  [16-19] 17  SpO2:  [93 %-100 %] 98 %  BP: (124-159)/(57-80) 157/65     Weight: 112.5 kg (248 lb)  Body mass index is 36.62 kg/m².    Estimated Creatinine Clearance: 21.7 mL/min (A) (based on SCr of 3.7 mg/dL (H)).     Physical Exam  Vitals and nursing note reviewed.   Constitutional:       General: He is not in acute distress.     Appearance: He is well-developed. He is not diaphoretic.   HENT:      Head: Normocephalic and atraumatic.   Eyes:      Pupils: Pupils are equal, round, and reactive to light.   Cardiovascular:      Rate and Rhythm: Normal rate and regular rhythm.      Heart sounds: Normal heart sounds. No murmur heard.     No friction rub. No gallop.   Pulmonary:      Effort: Pulmonary effort is normal. No respiratory distress.      Breath  sounds: Normal breath sounds. No wheezing or rales.   Chest:      Chest wall: No tenderness.   Abdominal:      General: Bowel sounds are normal. There is no distension.      Palpations: There is no mass.      Tenderness: There is no abdominal tenderness. There is no guarding.   Musculoskeletal:         General: No deformity. Normal range of motion.      Cervical back: Normal range of motion and neck supple.   Skin:     General: Skin is warm and dry.      Findings: No erythema or rash.      Comments: RIJ line in place c/d/I  Picc line removed    Bilateral heel wounds dressed. Refer to media    Neurological:      Mental Status: He is alert and oriented to person, place, and time.   Psychiatric:         Behavior: Behavior normal.         Thought Content: Thought content normal.         Judgment: Judgment normal.          Significant Labs: All pertinent labs within the past 24 hours have been reviewed.    Significant Imaging: I have reviewed all pertinent imaging results/findings within the past 24 hours.

## 2023-10-06 NOTE — ASSESSMENT & PLAN NOTE
Edema of leg  Vascular Surgery believes edema contributes to poor wound healing. Wound care. Arterial ultrasound.

## 2023-10-06 NOTE — PT/OT/SLP PROGRESS
"Speech Language Pathology Treatment    Patient Name:  Saji Castañeda   MRN:  [id number]  Admitting Diagnosis: Bacteremia due to Gram-negative bacteria    Recommendations:                 General Recommendations:  Cognitive-linguistic therapy  Diet recommendations:  Soft & Bite Sized Diet - IDDSI Level 6, Liquid Diet Level: Thin liquids - IDDSI Level 0   Aspiration Precautions: Standard aspiration precautions   General Precautions: Standard, fall  Communication strategies:  go to room if call light pushed    Assessment:     Saji Castañeda is a 73 y.o. male with an SLP diagnosis of Cognitive-Linguistic Impairment.     Subjective     "My vision is different."     Pain/Comfort:  Pain Rating 1: 0/10  Pain Rating Post-Intervention 1: 0/10    Respiratory Status: Room air    Objective:     Has the patient been evaluated by SLP for swallowing?   Yes  Keep patient NPO? No     Pt sitting upright in bed with grits on his gown as SLP entered the room. Pt agreed to participate in therapeutic tasks at the bedside. Pt with good recall of items on his breakfast tray this morning. He completed mental manipulation tasks with a list of 3 words in 3/3 trials. Pt repeated a list of 3 words backwards in 1/3 trials given max verbal cues. Pt named 8-9 items in concrete categories with min verbal cues and 4-5 items in abstract categories with mod verbal cues. Pt reports changes in his vision on the L c/b blurry vision. Pt able to identify 4 objects on the L side of the room. SLP will continue to assess visual spatial, reading, and writing skills in next session. SLP provided education re: rationale for cognitive-linguistic therapy, current goals, internal and external memory strats, and POC. Pt in agreement. Pt left sitting upright in the bed making a phone call as the SLP exited.     Goals:   Multidisciplinary Problems       SLP Goals          Problem: SLP    Goal Priority Disciplines Outcome   SLP Goal     SLP    Description: Speech Language " Pathology Goals  Goals expected to be met by 10/12    1. Pt will recall 3 novel items after 2 minute delay given mod cues.  2. Pt will complete immediate memory tasks with 70% accuracy given min cues.  3. Pt will follow 3-step commands with 70% accuracy given mod cues.  4. Pt will list 7 items from a category in 1 minute given min cues.   5. Pt will participate in assessment for reading, writing, and visuospatial abilities.                               Plan:     Patient to be seen:  3 x/week   Plan of Care expires:  11/02/23  Plan of Care reviewed with:  patient   SLP Follow-Up:  Yes       Discharge recommendations:  nursing facility, skilled       Time Tracking:     SLP Treatment Date:   10/06/23  Speech Start Time:  1003  Speech Stop Time:  1020     Speech Total Time (min):  17 min    Billable Minutes: Speech Therapy Individual 9 and Self Care/Home Management Training 8    10/06/2023

## 2023-10-06 NOTE — ASSESSMENT & PLAN NOTE
Holding home glipizide. He takes insulin glargine 45 units HS and insulin aspart 12 units TID. Giving insulin aspart 5 units TID and sliding scale. Add insulin detemir 10 units HS. Monitor Accuchecks.

## 2023-10-06 NOTE — PROGRESS NOTES
[facility] ([place] [address])  Infectious Disease  Progress Note    Patient Name: Saji Castañeda  MRN: [id number]  Admission Date: 10/2/2023  Length of Stay: 4 days  Attending Physician: Milton Shelton MD  Primary Care Provider: [facility] -    Isolation Status: Contact  Assessment/Plan:      ID  * Bacteremia due to Gram-negative bacteria  73-year-old male with history of afib, T2DM, PVD s/p left foot amputation and chronic OM on IV abx (linezolid, cefepime, metronidzole) at NH presented with weakness found to have sepsis secondary to ESBL Klebsiella and  Acinetobacter bacteremia. likely source includes PICC line - also consider from left heel wound. PICC line removed 10/4/2023. Repeat blood cultures today.     Recommendations:  1. Continue cefidericol 1gq8hr and fluconazole today  2. Follow up repeat blood cultures today   3. Podiatry following, no acute intervention, MARIO studies unable to obtain, recommend further imaging (CTA?) and would consider vascular surgery evaluation pending vascular studies  4. Follow up wound cultures       Chronic osteomyelitis  See bacteremia          Thank you for your consult. I will follow-up with patient. Please contact us if you have any additional questions.    Raheem White PA-C  Infectious Disease  [facility] ([place] [address])    Subjective:     Principal Problem:Bacteremia due to Gram-negative bacteria    HPI: 73-year-old male with history of afib, T2DM, PVD s/p left foot amputation, chronic osteomyelitis, presented from nursing home with weakness. Patient found to have mobitz type 1 AV block, admitted to CCU. Patient started on dobutamine IV. ID consulted for management of osteomyelitis of left foot. He was being treated with linezolid, cefepime, metronidazole at his nursing home. He had a left PICC line. Patient found to have Acinetobacter baumanii bacteremia. Patient reported feeling better compared to admission, denied having any  pain.    Interval History:   NAEON  Blood cultures +ESBL Klebsiella and  Acinetobacter  Wound cultures + yeast and GNR  Pt remains stable, afebrile. No acute complaints or concerns   Repeat blood cultures today     Review of Systems   Constitutional:  Negative for chills, diaphoresis, fatigue and fever.   HENT:  Negative for congestion, ear pain, nosebleeds, rhinorrhea and sore throat.    Eyes:  Negative for pain.   Respiratory:  Negative for cough, shortness of breath and wheezing.    Cardiovascular:  Negative for chest pain and leg swelling.   Gastrointestinal:  Negative for abdominal pain, constipation, diarrhea, nausea and vomiting.   Genitourinary:  Negative for dysuria, frequency and urgency.   Musculoskeletal:  Negative for arthralgias, back pain and neck pain.   Skin:  Positive for wound.   Neurological:  Negative for weakness, numbness and headaches.     Objective:     Vital Signs (Most Recent):  Temp: 98.4 °F (36.9 °C) (10/06/23 1125)  Pulse: (!) 41 (10/06/23 1140)  Resp: 17 (10/06/23 1125)  BP: (!) 157/65 (10/06/23 1125)  SpO2: 98 % (10/06/23 1125) Vital Signs (24h Range):  Temp:  [98 °F (36.7 °C)-99.1 °F (37.3 °C)] 98.4 °F (36.9 °C)  Pulse:  [41-89] 41  Resp:  [16-19] 17  SpO2:  [93 %-100 %] 98 %  BP: (124-159)/(57-80) 157/65     Weight: 112.5 kg (248 lb)  Body mass index is 36.62 kg/m².    Estimated Creatinine Clearance: 21.7 mL/min (A) (based on SCr of 3.7 mg/dL (H)).     Physical Exam  Vitals and nursing note reviewed.   Constitutional:       General: He is not in acute distress.     Appearance: He is well-developed. He is not diaphoretic.   HENT:      Head: Normocephalic and atraumatic.   Eyes:      Pupils: Pupils are equal, round, and reactive to light.   Cardiovascular:      Rate and Rhythm: Normal rate and regular rhythm.      Heart sounds: Normal heart sounds. No murmur heard.     No friction rub. No gallop.   Pulmonary:      Effort: Pulmonary effort is normal. No respiratory distress.       Breath sounds: Normal breath sounds. No wheezing or rales.   Chest:      Chest wall: No tenderness.   Abdominal:      General: Bowel sounds are normal. There is no distension.      Palpations: There is no mass.      Tenderness: There is no abdominal tenderness. There is no guarding.   Musculoskeletal:         General: No deformity. Normal range of motion.      Cervical back: Normal range of motion and neck supple.   Skin:     General: Skin is warm and dry.      Findings: No erythema or rash.      Comments: RIJ line in place c/d/I  Picc line removed    Bilateral heel wounds dressed. Refer to media    Neurological:      Mental Status: He is alert and oriented to person, place, and time.   Psychiatric:         Behavior: Behavior normal.         Thought Content: Thought content normal.         Judgment: Judgment normal.          Significant Labs: All pertinent labs within the past 24 hours have been reviewed.    Significant Imaging: I have reviewed all pertinent imaging results/findings within the past 24 hours.

## 2023-10-06 NOTE — SUBJECTIVE & OBJECTIVE
Interval History: ID recommends alternative imaging due to inability to get MARIO.    Review of Systems   Constitutional:  Negative for chills and fever.   Neurological:  Negative for seizures and syncope.     Objective:     Vital Signs (Most Recent):  Temp: 98.4 °F (36.9 °C) (10/06/23 1125)  Pulse: (!) 41 (10/06/23 1140)  Resp: 17 (10/06/23 1125)  BP: (!) 157/65 (10/06/23 1125)  SpO2: 98 % (10/06/23 1125) Vital Signs (24h Range):  Temp:  [98 °F (36.7 °C)-99.1 °F (37.3 °C)] 98.4 °F (36.9 °C)  Pulse:  [41-89] 41  Resp:  [16-19] 17  SpO2:  [93 %-100 %] 98 %  BP: (124-159)/(57-80) 157/65     Weight: 112.5 kg (248 lb)  Body mass index is 36.62 kg/m².  No intake or output data in the 24 hours ending 10/06/23 1355        Physical Exam  Vitals and nursing note reviewed.   Constitutional:       General: He is not in acute distress.     Appearance: He is well-developed. He is obese. He is not diaphoretic.   Musculoskeletal:      Right lower leg: Edema present.      Left lower leg: Edema present.   Skin:     General: Skin is warm and dry.      Coloration: Skin is not jaundiced or pale.   Neurological:      Mental Status: He is alert and oriented to person, place, and time. Mental status is at baseline.      Motor: No seizure activity.   Psychiatric:         Attention and Perception: Attention normal.         Mood and Affect: Affect normal.         Behavior: Behavior is not aggressive or combative.             Significant Labs: All pertinent labs within the past 24 hours have been reviewed.    Significant Imaging: I have reviewed all pertinent imaging results/findings within the past 24 hours.

## 2023-10-07 LAB
ANION GAP SERPL CALC-SCNC: 10 MMOL/L (ref 8–16)
BACTERIA SPEC AEROBE CULT: ABNORMAL
BACTERIA SPEC AEROBE CULT: ABNORMAL
BUN SERPL-MCNC: 51 MG/DL (ref 8–23)
CALCIUM SERPL-MCNC: 8.5 MG/DL (ref 8.7–10.5)
CHLORIDE SERPL-SCNC: 106 MMOL/L (ref 95–110)
CO2 SERPL-SCNC: 22 MMOL/L (ref 23–29)
CREAT SERPL-MCNC: 4.4 MG/DL (ref 0.5–1.4)
EST. GFR  (NO RACE VARIABLE): 13.3 ML/MIN/1.73 M^2
GLUCOSE SERPL-MCNC: 277 MG/DL (ref 70–110)
POCT GLUCOSE: 159 MG/DL (ref 70–110)
POCT GLUCOSE: 160 MG/DL (ref 70–110)
POCT GLUCOSE: 254 MG/DL (ref 70–110)
POCT GLUCOSE: 307 MG/DL (ref 70–110)
POTASSIUM SERPL-SCNC: 4.6 MMOL/L (ref 3.5–5.1)
SODIUM SERPL-SCNC: 138 MMOL/L (ref 136–145)

## 2023-10-07 PROCEDURE — 80048 BASIC METABOLIC PNL TOTAL CA: CPT | Performed by: HOSPITALIST

## 2023-10-07 PROCEDURE — 25000003 PHARM REV CODE 250: Performed by: PHYSICIAN ASSISTANT

## 2023-10-07 PROCEDURE — 21400001 HC TELEMETRY ROOM

## 2023-10-07 PROCEDURE — 99233 PR SUBSEQUENT HOSPITAL CARE,LEVL III: ICD-10-PCS | Mod: ,,, | Performed by: HOSPITALIST

## 2023-10-07 PROCEDURE — 27000207 HC ISOLATION

## 2023-10-07 PROCEDURE — 36415 COLL VENOUS BLD VENIPUNCTURE: CPT | Performed by: HOSPITALIST

## 2023-10-07 PROCEDURE — 25000003 PHARM REV CODE 250

## 2023-10-07 PROCEDURE — 25000003 PHARM REV CODE 250: Performed by: INTERNAL MEDICINE

## 2023-10-07 PROCEDURE — 63600175 PHARM REV CODE 636 W HCPCS: Mod: JZ,TB | Performed by: PHYSICIAN ASSISTANT

## 2023-10-07 PROCEDURE — 99233 SBSQ HOSP IP/OBS HIGH 50: CPT | Mod: ,,, | Performed by: HOSPITALIST

## 2023-10-07 PROCEDURE — 94761 N-INVAS EAR/PLS OXIMETRY MLT: CPT

## 2023-10-07 RX ADMIN — GABAPENTIN 100 MG: 100 CAPSULE ORAL at 09:10

## 2023-10-07 RX ADMIN — APIXABAN 5 MG: 5 TABLET, FILM COATED ORAL at 09:10

## 2023-10-07 RX ADMIN — ACETAMINOPHEN 650 MG: 325 TABLET ORAL at 01:10

## 2023-10-07 RX ADMIN — GABAPENTIN 100 MG: 100 CAPSULE ORAL at 10:10

## 2023-10-07 RX ADMIN — CEFIDEROCOL SULFATE TOSYLATE 1 G: 1 INJECTION, POWDER, FOR SOLUTION INTRAVENOUS at 10:10

## 2023-10-07 RX ADMIN — HYDRALAZINE HYDROCHLORIDE 100 MG: 50 TABLET ORAL at 09:10

## 2023-10-07 RX ADMIN — MICONAZOLE NITRATE: 20 POWDER TOPICAL at 10:10

## 2023-10-07 RX ADMIN — INSULIN ASPART 5 UNITS: 100 INJECTION, SOLUTION INTRAVENOUS; SUBCUTANEOUS at 12:10

## 2023-10-07 RX ADMIN — CEFIDEROCOL SULFATE TOSYLATE 1 G: 1 INJECTION, POWDER, FOR SOLUTION INTRAVENOUS at 03:10

## 2023-10-07 RX ADMIN — INSULIN ASPART 4 UNITS: 100 INJECTION, SOLUTION INTRAVENOUS; SUBCUTANEOUS at 12:10

## 2023-10-07 RX ADMIN — ACETAMINOPHEN 650 MG: 325 TABLET ORAL at 05:10

## 2023-10-07 RX ADMIN — TAMSULOSIN HYDROCHLORIDE 0.4 MG: 0.4 CAPSULE ORAL at 10:10

## 2023-10-07 RX ADMIN — THERA TABS 1 TABLET: TAB at 10:10

## 2023-10-07 RX ADMIN — CLOPIDOGREL BISULFATE 75 MG: 75 TABLET ORAL at 10:10

## 2023-10-07 RX ADMIN — APIXABAN 5 MG: 5 TABLET, FILM COATED ORAL at 10:10

## 2023-10-07 RX ADMIN — CEFIDEROCOL SULFATE TOSYLATE 1 G: 1 INJECTION, POWDER, FOR SOLUTION INTRAVENOUS at 05:10

## 2023-10-07 RX ADMIN — MICONAZOLE NITRATE: 20 POWDER TOPICAL at 09:10

## 2023-10-07 RX ADMIN — PANTOPRAZOLE SODIUM 40 MG: 40 TABLET, DELAYED RELEASE ORAL at 10:10

## 2023-10-07 RX ADMIN — FLUCONAZOLE 200 MG: 200 TABLET ORAL at 10:10

## 2023-10-07 RX ADMIN — HYDRALAZINE HYDROCHLORIDE 100 MG: 50 TABLET ORAL at 01:10

## 2023-10-07 RX ADMIN — HYDRALAZINE HYDROCHLORIDE 100 MG: 50 TABLET ORAL at 05:10

## 2023-10-07 RX ADMIN — INSULIN DETEMIR 10 UNITS: 100 INJECTION, SOLUTION SUBCUTANEOUS at 09:10

## 2023-10-07 RX ADMIN — INSULIN ASPART 5 UNITS: 100 INJECTION, SOLUTION INTRAVENOUS; SUBCUTANEOUS at 05:10

## 2023-10-07 RX ADMIN — GABAPENTIN 100 MG: 100 CAPSULE ORAL at 03:10

## 2023-10-07 RX ADMIN — ATORVASTATIN CALCIUM 80 MG: 40 TABLET, FILM COATED ORAL at 09:10

## 2023-10-07 RX ADMIN — OXYCODONE HYDROCHLORIDE AND ACETAMINOPHEN 500 MG: 500 TABLET ORAL at 10:10

## 2023-10-07 RX ADMIN — OXYCODONE HYDROCHLORIDE AND ACETAMINOPHEN 500 MG: 500 TABLET ORAL at 09:10

## 2023-10-07 NOTE — NURSING
Notified MD that patient's HR remains in the 30's at times having reached a low of 35. MD changed call parameters for patient's vital signs at this time due to patient status.

## 2023-10-07 NOTE — PLAN OF CARE
Problem: Adult Inpatient Plan of Care  Goal: Plan of Care Review  Outcome: Ongoing, Progressing  Goal: Patient-Specific Goal (Individualized)  Outcome: Ongoing, Progressing  Goal: Absence of Hospital-Acquired Illness or Injury  Outcome: Ongoing, Progressing  Goal: Optimal Comfort and Wellbeing  Outcome: Ongoing, Progressing  Goal: Readiness for Transition of Care  Outcome: Ongoing, Progressing     Problem: Diabetes Comorbidity  Goal: Blood Glucose Level Within Targeted Range  Outcome: Ongoing, Progressing     Problem: Adjustment to Illness (Sepsis/Septic Shock)  Goal: Optimal Coping  Outcome: Ongoing, Progressing     Problem: Bleeding (Sepsis/Septic Shock)  Goal: Absence of Bleeding  Outcome: Ongoing, Progressing     Problem: Glycemic Control Impaired (Sepsis/Septic Shock)  Goal: Blood Glucose Level Within Desired Range  Outcome: Ongoing, Progressing     Problem: Infection Progression (Sepsis/Septic Shock)  Goal: Absence of Infection Signs and Symptoms  Outcome: Ongoing, Progressing     Problem: Nutrition Impaired (Sepsis/Septic Shock)  Goal: Optimal Nutrition Intake  Outcome: Ongoing, Progressing     Problem: Infection  Goal: Absence of Infection Signs and Symptoms  Outcome: Ongoing, Progressing     Problem: Skin Injury Risk Increased  Goal: Skin Health and Integrity  Outcome: Ongoing, Progressing     Problem: Impaired Wound Healing  Goal: Optimal Wound Healing  Outcome: Ongoing, Progressing     Problem: Fluid and Electrolyte Imbalance (Acute Kidney Injury/Impairment)  Goal: Fluid and Electrolyte Balance  Outcome: Ongoing, Progressing     Problem: Oral Intake Inadequate (Acute Kidney Injury/Impairment)  Goal: Optimal Nutrition Intake  Outcome: Ongoing, Progressing     Problem: Renal Function Impairment (Acute Kidney Injury/Impairment)  Goal: Effective Renal Function  Outcome: Ongoing, Progressing

## 2023-10-07 NOTE — NURSING
Dr Shelton notified that patient's HR dipping into the 30's. Patient currently asymptomatic. AOx3. BP Stable. MD states that it is ok to continue to monitor patient's pulse as long as HR stays in 30's or above and BP is not low. No new orders at this time.

## 2023-10-07 NOTE — PROGRESS NOTES
[facility] - Intensive Care ([address] Medicine  Progress Note    Patient Name: Saji Castañeda  MRN: [id number]  Patient Class: IP- Inpatient   Admission Date: 10/2/2023  Length of Stay: 5 days  Attending Physician: Milton Shelton MD  Primary Care Provider: [facility] -        Subjective:     Principal Problem:Bacteremia due to Gram-negative bacteria        HPI:  Saji Castañeda is a 73 year old Black man with hypertension, hyperlipidemia, diabetes mellitus type 2 with neuropathy, left ventricular concentric hypertrophy, paroxysmal atrial fibrillation (anticoagulated on apixaban), Mobitz type I atrioventricular heart block, chronic kidney disease stage 3, chronic venous hypertension, peripheral vascular disease, 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 He lives in [place], [place].               He presented to [facility] on 10/2/2023 from [facility] due to concerns of weakness and feeling like he was going to die. He reported no dizziness or headaches. He knew his name, where he was, and the current year but had trouble remembering which nursing home he came from. Blood glucose was in the 20s. He was given dextrose in the emergency department and glucose improved to 96 mg/dL. Labs showed acute kidney injury with creatinine of 2.6 mg/dL from baseline of 1.3 and lactic acid elevated at 2.29 mmol/L. He had complete heart block. He was admitted to Cardiology.      Overview/Hospital Course:  Right internal jugular central venous catheter was placed. He was found to have acute osteomyelitis of the posterior aspect of the calcaneus. He had hypotension concerning for septic shock. He was started on dobutamine, vancomycin, and piperacillin-tazobactam. Blood  culture grew Gram negative rods. PCR was positive for Acinetobacter and Klebsiella pneumoniae. He had aphasia. Head CT and brain MRI showed no acute stroke. Infectious Disease was consulted as he had been on linezolid, cefepime, and metronidazole for osteomyelitis. PICC line was removed and IV access was replaced in anticipation for long-term IV antibiotics. Podiatry was consulted and recommended X-rays and Wound Care consult. Wound was cultured. He had acute on chronic renal failure. Renal ultrasound showed no obstruction. He was given fluid resuscitation. Heart rate was able to augment appropriately with exertion with transmission of P waves. Repeat EKG showed 2nd degree AV block replacing junctional rhythm. Electrophysiology recommended pacemaker placement. Vascular Surgery suspected poor wound healing to be due to severe edema. They ordered ankle brachial indices but it was unable to be done due to the hardness of his skin. They did not recommend amputation based on their physical exam findings. He was transferred to Hospital Medicine Team D on 10/5/2023. Infectious Disease changed piperacillin-tazobactam to cefidericol and fluconazole. He continued to have asymptomatic bradycardia.       Interval History: Cannot get CTA due to renal dysfunction. Got lower extremity arterial ultrasound, results pending.    Review of Systems   Constitutional:  Negative for chills and fever.   Neurological:  Negative for seizures and syncope.     Objective:     Vital Signs (Most Recent):  Temp: 98 °F (36.7 °C) (10/07/23 0748)  Pulse: 73 (10/07/23 0748)  Resp: 18 (10/07/23 0748)  BP: (!) 148/68 (10/07/23 0748)  SpO2: 96 % (10/07/23 0748) Vital Signs (24h Range):  Temp:  [97.7 °F (36.5 °C)-99 °F (37.2 °C)] 98 °F (36.7 °C)  Pulse:  [41-85] 73  Resp:  [17-18] 18  SpO2:  [94 %-98 %] 96 %  BP: (132-167)/(62-70) 148/68     Weight: 112.5 kg (248 lb)  Body mass index is 36.62 kg/m².    Intake/Output Summary (Last 24 hours) at 10/7/2023  0948  Last data filed at 10/7/2023 0545  Gross per 24 hour   Intake 800 ml   Output 800 ml   Net 0 ml           Physical Exam  Vitals and nursing note reviewed.   Constitutional:       General: He is not in acute distress.     Appearance: He is well-developed. He is obese. He is not diaphoretic.   Musculoskeletal:      Right lower leg: Edema present.      Left lower leg: Edema present.   Skin:     General: Skin is warm and dry.      Coloration: Skin is not jaundiced or pale.   Neurological:      Mental Status: He is alert and oriented to person, place, and time. Mental status is at baseline.      Motor: No seizure activity.   Psychiatric:         Attention and Perception: Attention normal.         Mood and Affect: Affect normal.         Behavior: Behavior is not aggressive or combative.             Significant Labs: All pertinent labs within the past 24 hours have been reviewed.    Significant Imaging: I have reviewed all pertinent imaging results/findings within the past 24 hours.      Assessment/Plan:      * Bacteremia due to Gram-negative bacteria  On cefidericol and fluconazole. Appreciate Infectious Disease.    History of complete heart block  AV block, 2nd degree  Electrophysiology recommends pacemaker.    Paroxysmal atrial fibrillation  Continue home apixaban.     AV block, 2nd degree  Seen by Electrophysiology, who recommend pacemaker placement.    Nonhealing ulcer of right lower leg with fat layer exposed  Edema of leg  Vascular Surgery believes edema contributes to poor wound healing. Wound care. Arterial ultrasound.    Chronic deep vein thrombosis (DVT) of right upper extremity  Continue home apixaban.    Peripheral arterial disease  Continue home clopidrogel and atorvastatin. Evaluated by Interventional Cardiology in May and deemed not to be a revascularization candidate due to extensive disease, multiple comorbidities, debility/bed bound status and concern for noncompliance.    Acute on chronic renal  failure  Monitor labs.     Insulin dependent type 2 diabetes mellitus  Holding home glipizide. He takes insulin glargine 45 units HS and insulin aspart 12 units TID. Giving insulin aspart 5 units TID and sliding scale. Add insulin detemir 10 units HS. Monitor Accuchecks.      VTE Risk Mitigation (From admission, onward)         Ordered     apixaban tablet 5 mg  2 times daily         10/02/23 1544     IP VTE HIGH RISK PATIENT  Once         10/02/23 1513     Place sequential compression device  Until discontinued         10/02/23 1513                Discharge Planning   OXANA: 10/10/2023     Code Status: Full Code   Is the patient medically ready for discharge?:     Reason for patient still in hospital (select all that apply): Treatment and Consult recommendations  Discharge Plan A: Home with family, Home Health                  Milton Shelton MD  Department of Hospital Medicine   [facility] - Intensive Care ([place])

## 2023-10-08 LAB
ANION GAP SERPL CALC-SCNC: 10 MMOL/L (ref 8–16)
BUN SERPL-MCNC: 51 MG/DL (ref 8–23)
CALCIUM SERPL-MCNC: 8.5 MG/DL (ref 8.7–10.5)
CHLORIDE SERPL-SCNC: 108 MMOL/L (ref 95–110)
CO2 SERPL-SCNC: 21 MMOL/L (ref 23–29)
CREAT SERPL-MCNC: 4.1 MG/DL (ref 0.5–1.4)
EST. GFR  (NO RACE VARIABLE): 14.5 ML/MIN/1.73 M^2
GLUCOSE SERPL-MCNC: 178 MG/DL (ref 70–110)
POCT GLUCOSE: 169 MG/DL (ref 70–110)
POCT GLUCOSE: 188 MG/DL (ref 70–110)
POCT GLUCOSE: 224 MG/DL (ref 70–110)
POCT GLUCOSE: 265 MG/DL (ref 70–110)
POTASSIUM SERPL-SCNC: 5 MMOL/L (ref 3.5–5.1)
SODIUM SERPL-SCNC: 139 MMOL/L (ref 136–145)

## 2023-10-08 PROCEDURE — 80048 BASIC METABOLIC PNL TOTAL CA: CPT | Performed by: HOSPITALIST

## 2023-10-08 PROCEDURE — 99233 PR SUBSEQUENT HOSPITAL CARE,LEVL III: ICD-10-PCS | Mod: ,,, | Performed by: HOSPITALIST

## 2023-10-08 PROCEDURE — 63600175 PHARM REV CODE 636 W HCPCS: Mod: JZ,TB | Performed by: PHYSICIAN ASSISTANT

## 2023-10-08 PROCEDURE — 21400001 HC TELEMETRY ROOM

## 2023-10-08 PROCEDURE — 27000207 HC ISOLATION

## 2023-10-08 PROCEDURE — 36415 COLL VENOUS BLD VENIPUNCTURE: CPT | Performed by: HOSPITALIST

## 2023-10-08 PROCEDURE — 25000003 PHARM REV CODE 250: Performed by: PHYSICIAN ASSISTANT

## 2023-10-08 PROCEDURE — 25000003 PHARM REV CODE 250

## 2023-10-08 PROCEDURE — 99233 SBSQ HOSP IP/OBS HIGH 50: CPT | Mod: ,,, | Performed by: HOSPITALIST

## 2023-10-08 PROCEDURE — 25000003 PHARM REV CODE 250: Performed by: HOSPITALIST

## 2023-10-08 PROCEDURE — 25000003 PHARM REV CODE 250: Performed by: INTERNAL MEDICINE

## 2023-10-08 RX ORDER — GABAPENTIN 100 MG/1
100 CAPSULE ORAL 2 TIMES DAILY
Status: DISCONTINUED | OUTPATIENT
Start: 2023-10-08 | End: 2023-10-19 | Stop reason: HOSPADM

## 2023-10-08 RX ADMIN — CEFIDEROCOL SULFATE TOSYLATE 1 G: 1 INJECTION, POWDER, FOR SOLUTION INTRAVENOUS at 10:10

## 2023-10-08 RX ADMIN — ACETAMINOPHEN 650 MG: 325 TABLET ORAL at 05:10

## 2023-10-08 RX ADMIN — APIXABAN 5 MG: 5 TABLET, FILM COATED ORAL at 08:10

## 2023-10-08 RX ADMIN — ACETAMINOPHEN 650 MG: 325 TABLET ORAL at 01:10

## 2023-10-08 RX ADMIN — CEFIDEROCOL SULFATE TOSYLATE 1 G: 1 INJECTION, POWDER, FOR SOLUTION INTRAVENOUS at 05:10

## 2023-10-08 RX ADMIN — APIXABAN 5 MG: 5 TABLET, FILM COATED ORAL at 09:10

## 2023-10-08 RX ADMIN — INSULIN ASPART 2 UNITS: 100 INJECTION, SOLUTION INTRAVENOUS; SUBCUTANEOUS at 05:10

## 2023-10-08 RX ADMIN — INSULIN ASPART 3 UNITS: 100 INJECTION, SOLUTION INTRAVENOUS; SUBCUTANEOUS at 01:10

## 2023-10-08 RX ADMIN — HYDRALAZINE HYDROCHLORIDE 100 MG: 50 TABLET ORAL at 05:10

## 2023-10-08 RX ADMIN — THERA TABS 1 TABLET: TAB at 09:10

## 2023-10-08 RX ADMIN — INSULIN ASPART 5 UNITS: 100 INJECTION, SOLUTION INTRAVENOUS; SUBCUTANEOUS at 05:10

## 2023-10-08 RX ADMIN — CLOPIDOGREL BISULFATE 75 MG: 75 TABLET ORAL at 09:10

## 2023-10-08 RX ADMIN — OXYCODONE HYDROCHLORIDE AND ACETAMINOPHEN 500 MG: 500 TABLET ORAL at 09:10

## 2023-10-08 RX ADMIN — INSULIN ASPART 5 UNITS: 100 INJECTION, SOLUTION INTRAVENOUS; SUBCUTANEOUS at 08:10

## 2023-10-08 RX ADMIN — MICONAZOLE NITRATE: 20 POWDER TOPICAL at 08:10

## 2023-10-08 RX ADMIN — ATORVASTATIN CALCIUM 80 MG: 40 TABLET, FILM COATED ORAL at 08:10

## 2023-10-08 RX ADMIN — HYDRALAZINE HYDROCHLORIDE 100 MG: 50 TABLET ORAL at 08:10

## 2023-10-08 RX ADMIN — OXYCODONE HYDROCHLORIDE AND ACETAMINOPHEN 500 MG: 500 TABLET ORAL at 08:10

## 2023-10-08 RX ADMIN — MICONAZOLE NITRATE: 20 POWDER TOPICAL at 09:10

## 2023-10-08 RX ADMIN — GABAPENTIN 100 MG: 300 CAPSULE ORAL at 08:10

## 2023-10-08 RX ADMIN — PANTOPRAZOLE SODIUM 40 MG: 40 TABLET, DELAYED RELEASE ORAL at 09:10

## 2023-10-08 RX ADMIN — TAMSULOSIN HYDROCHLORIDE 0.4 MG: 0.4 CAPSULE ORAL at 09:10

## 2023-10-08 RX ADMIN — FLUCONAZOLE 200 MG: 200 TABLET ORAL at 09:10

## 2023-10-08 RX ADMIN — INSULIN ASPART 5 UNITS: 100 INJECTION, SOLUTION INTRAVENOUS; SUBCUTANEOUS at 01:10

## 2023-10-08 RX ADMIN — HYDRALAZINE HYDROCHLORIDE 100 MG: 50 TABLET ORAL at 01:10

## 2023-10-08 NOTE — PLAN OF CARE
Problem: Adult Inpatient Plan of Care  Goal: Plan of Care Review  Outcome: Ongoing, Progressing  Goal: Optimal Comfort and Wellbeing  Outcome: Ongoing, Progressing     Problem: Diabetes Comorbidity  Goal: Blood Glucose Level Within Targeted Range  Outcome: Ongoing, Progressing     Problem: Infection  Goal: Absence of Infection Signs and Symptoms  Outcome: Ongoing, Progressing     Problem: Skin Injury Risk Increased  Goal: Skin Health and Integrity  Outcome: Ongoing, Progressing     Problem: Impaired Wound Healing  Goal: Optimal Wound Healing  Outcome: Ongoing, Progressing     Problem: Fluid and Electrolyte Imbalance (Acute Kidney Injury/Impairment)  Goal: Fluid and Electrolyte Balance  Outcome: Ongoing, Progressing     Problem: Renal Function Impairment (Acute Kidney Injury/Impairment)  Goal: Effective Renal Function  Outcome: Ongoing, Progressing

## 2023-10-08 NOTE — ASSESSMENT & PLAN NOTE
Edema of leg  Vascular Surgery believes edema contributes to poor wound healing. They did not recommend amputation. Wound care. Arterial ultrasound suggested severe stenosis of right posterior tibial artery and moderate stenosis of right anterior tibial artery. Already on medical management for PAD with clopidrogel, apixaban, atorvastatin.

## 2023-10-08 NOTE — PROGRESS NOTES
[facility] - Intensive Care ([address] Medicine  Progress Note    Patient Name: Saji Castañeda  MRN: [id number]  Patient Class: IP- Inpatient   Admission Date: 10/2/2023  Length of Stay: 6 days  Attending Physician: Milton Shelton MD  Primary Care Provider: [facility] -        Subjective:     Principal Problem:Bacteremia due to Gram-negative bacteria        HPI:  Saji Castañeda is a 73 year old Black man with hypertension, hyperlipidemia, diabetes mellitus type 2 with neuropathy, left ventricular concentric hypertrophy, paroxysmal atrial fibrillation (anticoagulated on apixaban), Mobitz type I atrioventricular heart block, chronic kidney disease stage 3, chronic venous hypertension, peripheral vascular disease, 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 He lives in [place], [place].               He presented to [facility] on 10/2/2023 from [facility] due to concerns of weakness and feeling like he was going to die. He reported no dizziness or headaches. He knew his name, where he was, and the current year but had trouble remembering which nursing home he came from. Blood glucose was in the 20s. He was given dextrose in the emergency department and glucose improved to 96 mg/dL. Labs showed acute kidney injury with creatinine of 2.6 mg/dL from baseline of 1.3 and lactic acid elevated at 2.29 mmol/L. He had complete heart block. He was admitted to Cardiology.      Overview/Hospital Course:  Right internal jugular central venous catheter was placed. He was found to have acute osteomyelitis of the posterior aspect of the calcaneus. He had hypotension concerning for septic shock. He was started on dobutamine, vancomycin, and piperacillin-tazobactam. Blood  culture grew Gram negative rods. PCR was positive for Acinetobacter and Klebsiella pneumoniae. He had aphasia. Head CT and brain MRI showed no acute stroke. Infectious Disease was consulted as he had been on linezolid, cefepime, and metronidazole for osteomyelitis. PICC line was removed and IV access was replaced in anticipation for long-term IV antibiotics. Podiatry was consulted and recommended X-rays and Wound Care consult. Wound was cultured. He had acute on chronic renal failure. Renal ultrasound showed no obstruction. He was given fluid resuscitation. Heart rate was able to augment appropriately with exertion with transmission of P waves. Repeat EKG showed 2nd degree AV block replacing junctional rhythm. Electrophysiology recommended pacemaker placement. Vascular Surgery suspected poor wound healing to be due to severe edema. They ordered ankle brachial indices but it was unable to be done due to the hardness of his skin. They did not recommend amputation based on their physical exam findings. He was transferred to Hospital Medicine Team D on 10/5/2023. Infectious Disease changed piperacillin-tazobactam to cefidericol and fluconazole. He continued to have asymptomatic bradycardia. Foot wound culture grew Achromobacter xylosoxidans dentrificans and Candida albicans. Lower extremity arterial ultrasound suggested moderate stenosis of the right anterior tibial artery and severe stenosis of the right posterior tibial artery. Creatinine peaked at 4.4 mg/dL on 10/7/2023 before improving.       Interval History: Creatinine peaked yesterday, now improving.     Review of Systems   Constitutional:  Negative for chills and fever.   Neurological:  Negative for seizures and syncope.     Objective:     Vital Signs (Most Recent):  Temp: 97.9 °F (36.6 °C) (10/08/23 0805)  Pulse: (!) 54 (10/08/23 0805)  Resp: 18 (10/08/23 0805)  BP: (!) 134/90 (10/08/23 0805)  SpO2: (!) 94 % (10/08/23 0805) Vital Signs (24h Range):  Temp:   [97.5 °F (36.4 °C)-99 °F (37.2 °C)] 97.9 °F (36.6 °C)  Pulse:  [35-77] 54  Resp:  [16-19] 18  SpO2:  [92 %-97 %] 94 %  BP: (128-160)/(61-90) 134/90     Weight: 112.5 kg (248 lb)  Body mass index is 36.62 kg/m².    Intake/Output Summary (Last 24 hours) at 10/8/2023 0807  Last data filed at 10/8/2023 0545  Gross per 24 hour   Intake 540 ml   Output 750 ml   Net -210 ml           Physical Exam  Vitals and nursing note reviewed.   Constitutional:       General: He is not in acute distress.     Appearance: He is well-developed. He is obese. He is not diaphoretic.   Musculoskeletal:      Right lower leg: Edema present.      Left lower leg: Edema present.   Skin:     General: Skin is warm and dry.      Coloration: Skin is not jaundiced or pale.   Neurological:      Mental Status: He is alert and oriented to person, place, and time. Mental status is at baseline.      Motor: No seizure activity.   Psychiatric:         Attention and Perception: Attention normal.         Mood and Affect: Affect normal.         Behavior: Behavior is not aggressive or combative.             Significant Labs: All pertinent labs within the past 24 hours have been reviewed.    Significant Imaging: I have reviewed all pertinent imaging results/findings within the past 24 hours.  US Lower Extremity Arteries Bilateral 10/07/23: FINDINGS:   The peak systolic velocities on the right are as follows, in centimeters/second:   Common femoral artery: 140 with triphasic waveform   Deep femoral artery: 64 with triphasic waveform   Superficial femoral artery, proximal: 124 triphasic waveform   Superficial femoral artery, mid portion: 130 with phasic waveform   Superficial femoral artery, distal: 119 with triphasic waveform   Proximal popliteal artery: 72 which phasic waveform   Distal popliteal artery: 130 with triphasic from   Anterior tibial artery: 163 with biphasic waveform   Posterior tibial artery: 33 with monophasic waveform diminutive in caliber and  difficult to visualize noting severe multifocal atherosclerotic disease on prior CTA runoff.   The peak systolic velocities on the left are as follows, in centimeters/second:   Common femoral artery: 132 with triphasic waveform   Deep femoral artery: 236 with triphasic waveform   Superficial femoral artery, proximal: 115 with triphasic waveform   Superficial femoral artery, mid portion: 113 with triphasic waveform   Superficial femoral artery, distal: 106 with triphasic waveform   Proximal popliteal artery: 115 with triphasic waveform   Distal popliteal artery: 85 with triphasic waveform   Anterior tibial artery: 80 with triphasic waveform   Posterior tibial artery: 88 with monophasic waveform   Prominent to mildly enlarged bilateral inguinal nodes.   Impression:  Velocity doubling in the right anterior tibial artery which estimates a moderate degree of stenosis in this vessel.   Diminutive right posterior tibial artery which is difficult to visualize likely reflecting areas of severe narrowing noting severe multifocal atherosclerotic disease visualized on prior CTA runoff.       Assessment/Plan:      * Bacteremia due to Gram-negative bacteria  On cefidericol and fluconazole. Appreciate Infectious Disease.    History of complete heart block  AV block, 2nd degree  Electrophysiology recommends pacemaker.    Paroxysmal atrial fibrillation  Continue home apixaban.     AV block, 2nd degree  Seen by Electrophysiology, who recommend pacemaker placement.    Nonhealing ulcer of right lower leg with fat layer exposed  Edema of leg  Vascular Surgery believes edema contributes to poor wound healing. They did not recommend amputation. Wound care. Arterial ultrasound suggested severe stenosis of right posterior tibial artery and moderate stenosis of right anterior tibial artery. Already on medical management for PAD with clopidrogel, apixaban, atorvastatin.     Chronic deep vein thrombosis (DVT) of right upper extremity  Continue  home apixaban.    Peripheral arterial disease  Continue home clopidrogel and atorvastatin. Evaluated by Interventional Cardiology in May and deemed not to be a revascularization candidate due to extensive disease, multiple comorbidities, debility/bed bound status and concern for noncompliance.    Acute on chronic renal failure  Monitor labs. Creatinine now improving.    Insulin dependent type 2 diabetes mellitus  Holding home glipizide. He takes insulin glargine 45 units HS and insulin aspart 12 units TID. Giving insulin detemir 10 units HS, insulin aspart 5 units TID and sliding scale. Increase insulin detemir to 15 units HS. Monitor Accuchecks.      VTE Risk Mitigation (From admission, onward)           Ordered     apixaban tablet 5 mg  2 times daily         10/02/23 1544     IP VTE HIGH RISK PATIENT  Once         10/02/23 1513     Place sequential compression device  Until discontinued         10/02/23 1513                    Discharge Planning   OXANA: 10/12/2023     Code Status: Full Code   Is the patient medically ready for discharge?: No    Reason for patient still in hospital (select all that apply): Patient trending condition, Treatment and Consult recommendations  Discharge Plan A: Home with family, Home Health                  Milton Shelton MD  Department of Hospital Medicine   [facility] - Intensive Care ([place])

## 2023-10-08 NOTE — SUBJECTIVE & OBJECTIVE
Interval History: Creatinine peaked yesterday, now improving.     Review of Systems   Constitutional:  Negative for chills and fever.   Neurological:  Negative for seizures and syncope.     Objective:     Vital Signs (Most Recent):  Temp: 97.9 °F (36.6 °C) (10/08/23 0805)  Pulse: (!) 54 (10/08/23 0805)  Resp: 18 (10/08/23 0805)  BP: (!) 134/90 (10/08/23 0805)  SpO2: (!) 94 % (10/08/23 0805) Vital Signs (24h Range):  Temp:  [97.5 °F (36.4 °C)-99 °F (37.2 °C)] 97.9 °F (36.6 °C)  Pulse:  [35-77] 54  Resp:  [16-19] 18  SpO2:  [92 %-97 %] 94 %  BP: (128-160)/(61-90) 134/90     Weight: 112.5 kg (248 lb)  Body mass index is 36.62 kg/m².    Intake/Output Summary (Last 24 hours) at 10/8/2023 0807  Last data filed at 10/8/2023 0545  Gross per 24 hour   Intake 540 ml   Output 750 ml   Net -210 ml           Physical Exam  Vitals and nursing note reviewed.   Constitutional:       General: He is not in acute distress.     Appearance: He is well-developed. He is obese. He is not diaphoretic.   Musculoskeletal:      Right lower leg: Edema present.      Left lower leg: Edema present.   Skin:     General: Skin is warm and dry.      Coloration: Skin is not jaundiced or pale.   Neurological:      Mental Status: He is alert and oriented to person, place, and time. Mental status is at baseline.      Motor: No seizure activity.   Psychiatric:         Attention and Perception: Attention normal.         Mood and Affect: Affect normal.         Behavior: Behavior is not aggressive or combative.             Significant Labs: All pertinent labs within the past 24 hours have been reviewed.    Significant Imaging: I have reviewed all pertinent imaging results/findings within the past 24 hours.  US Lower Extremity Arteries Bilateral 10/07/23: FINDINGS:   The peak systolic velocities on the right are as follows, in centimeters/second:   Common femoral artery: 140 with triphasic waveform   Deep femoral artery: 64 with triphasic waveform    Superficial femoral artery, proximal: 124 triphasic waveform   Superficial femoral artery, mid portion: 130 with phasic waveform   Superficial femoral artery, distal: 119 with triphasic waveform   Proximal popliteal artery: 72 which phasic waveform   Distal popliteal artery: 130 with triphasic from   Anterior tibial artery: 163 with biphasic waveform   Posterior tibial artery: 33 with monophasic waveform diminutive in caliber and difficult to visualize noting severe multifocal atherosclerotic disease on prior CTA runoff.   The peak systolic velocities on the left are as follows, in centimeters/second:   Common femoral artery: 132 with triphasic waveform   Deep femoral artery: 236 with triphasic waveform   Superficial femoral artery, proximal: 115 with triphasic waveform   Superficial femoral artery, mid portion: 113 with triphasic waveform   Superficial femoral artery, distal: 106 with triphasic waveform   Proximal popliteal artery: 115 with triphasic waveform   Distal popliteal artery: 85 with triphasic waveform   Anterior tibial artery: 80 with triphasic waveform   Posterior tibial artery: 88 with monophasic waveform   Prominent to mildly enlarged bilateral inguinal nodes.   Impression:  Velocity doubling in the right anterior tibial artery which estimates a moderate degree of stenosis in this vessel.   Diminutive right posterior tibial artery which is difficult to visualize likely reflecting areas of severe narrowing noting severe multifocal atherosclerotic disease visualized on prior CTA runoff.

## 2023-10-08 NOTE — ASSESSMENT & PLAN NOTE
Holding home glipizide. He takes insulin glargine 45 units HS and insulin aspart 12 units TID. Giving insulin detemir 15 units HS, insulin aspart 5 units TID and sliding scale. Increase insulin detemir to 20 units HS. Monitor Accuchecks.

## 2023-10-08 NOTE — CARE UPDATE
Recent EKG reviewed. AV node appears to have improved, now with 2nd degree heart block Mobitz I. Patient may benefit from a micra leadless pacemaker implantation, possibly prior to discharge vs outpatient.

## 2023-10-09 PROBLEM — H53.8 BLURRED VISION, LEFT EYE: Chronic | Status: ACTIVE | Noted: 2023-10-09

## 2023-10-09 LAB
ANION GAP SERPL CALC-SCNC: 10 MMOL/L (ref 8–16)
BACTERIA BLD CULT: ABNORMAL
BACTERIA SPEC ANAEROBE CULT: NORMAL
BNP SERPL-MCNC: 370 PG/ML (ref 0–99)
BUN SERPL-MCNC: 51 MG/DL (ref 8–23)
CALCIUM SERPL-MCNC: 8.5 MG/DL (ref 8.7–10.5)
CHLORIDE SERPL-SCNC: 108 MMOL/L (ref 95–110)
CO2 SERPL-SCNC: 20 MMOL/L (ref 23–29)
CREAT SERPL-MCNC: 4.6 MG/DL (ref 0.5–1.4)
CREAT UR-MCNC: 98 MG/DL (ref 23–375)
CREAT UR-MCNC: 98 MG/DL (ref 23–375)
ERYTHROCYTE [DISTWIDTH] IN BLOOD BY AUTOMATED COUNT: 18.2 % (ref 11.5–14.5)
EST. GFR  (NO RACE VARIABLE): 12.7 ML/MIN/1.73 M^2
GLUCOSE SERPL-MCNC: 215 MG/DL (ref 70–110)
HCT VFR BLD AUTO: 27.4 % (ref 40–54)
HGB BLD-MCNC: 8.4 G/DL (ref 14–18)
MCH RBC QN AUTO: 27.4 PG (ref 27–31)
MCHC RBC AUTO-ENTMCNC: 30.7 G/DL (ref 32–36)
MCV RBC AUTO: 89 FL (ref 82–98)
PLATELET # BLD AUTO: 226 K/UL (ref 150–450)
PMV BLD AUTO: 10.2 FL (ref 9.2–12.9)
POCT GLUCOSE: 235 MG/DL (ref 70–110)
POCT GLUCOSE: 235 MG/DL (ref 70–110)
POCT GLUCOSE: 241 MG/DL (ref 70–110)
POCT GLUCOSE: 275 MG/DL (ref 70–110)
POTASSIUM SERPL-SCNC: 5 MMOL/L (ref 3.5–5.1)
PROT UR-MCNC: 113 MG/DL (ref 0–15)
PROT/CREAT UR: 1.15 MG/G{CREAT} (ref 0–0.2)
RBC # BLD AUTO: 3.07 M/UL (ref 4.6–6.2)
SODIUM SERPL-SCNC: 138 MMOL/L (ref 136–145)
SODIUM UR-SCNC: 43 MMOL/L (ref 20–250)
WBC # BLD AUTO: 8.4 K/UL (ref 3.9–12.7)

## 2023-10-09 PROCEDURE — 25000003 PHARM REV CODE 250: Performed by: HOSPITALIST

## 2023-10-09 PROCEDURE — 27000207 HC ISOLATION

## 2023-10-09 PROCEDURE — 97535 SELF CARE MNGMENT TRAINING: CPT | Mod: CO

## 2023-10-09 PROCEDURE — 99233 PR SUBSEQUENT HOSPITAL CARE,LEVL III: ICD-10-PCS | Mod: ,,, | Performed by: REGISTERED NURSE

## 2023-10-09 PROCEDURE — 97530 THERAPEUTIC ACTIVITIES: CPT | Mod: CO

## 2023-10-09 PROCEDURE — 63600175 PHARM REV CODE 636 W HCPCS: Mod: JZ,TB | Performed by: PHYSICIAN ASSISTANT

## 2023-10-09 PROCEDURE — 85027 COMPLETE CBC AUTOMATED: CPT | Performed by: HOSPITALIST

## 2023-10-09 PROCEDURE — 83880 ASSAY OF NATRIURETIC PEPTIDE: CPT | Performed by: HOSPITALIST

## 2023-10-09 PROCEDURE — 84156 ASSAY OF PROTEIN URINE: CPT | Performed by: HOSPITALIST

## 2023-10-09 PROCEDURE — 21400001 HC TELEMETRY ROOM

## 2023-10-09 PROCEDURE — 97129 THER IVNTJ 1ST 15 MIN: CPT

## 2023-10-09 PROCEDURE — 25000003 PHARM REV CODE 250

## 2023-10-09 PROCEDURE — 97535 SELF CARE MNGMENT TRAINING: CPT

## 2023-10-09 PROCEDURE — 97530 THERAPEUTIC ACTIVITIES: CPT

## 2023-10-09 PROCEDURE — 99233 SBSQ HOSP IP/OBS HIGH 50: CPT | Mod: ,,, | Performed by: REGISTERED NURSE

## 2023-10-09 PROCEDURE — 99233 PR SUBSEQUENT HOSPITAL CARE,LEVL III: ICD-10-PCS | Mod: ,,, | Performed by: HOSPITALIST

## 2023-10-09 PROCEDURE — 87086 URINE CULTURE/COLONY COUNT: CPT | Performed by: HOSPITALIST

## 2023-10-09 PROCEDURE — 81001 URINALYSIS AUTO W/SCOPE: CPT | Performed by: HOSPITALIST

## 2023-10-09 PROCEDURE — 87205 SMEAR GRAM STAIN: CPT | Performed by: HOSPITALIST

## 2023-10-09 PROCEDURE — 25000003 PHARM REV CODE 250: Performed by: INTERNAL MEDICINE

## 2023-10-09 PROCEDURE — 80048 BASIC METABOLIC PNL TOTAL CA: CPT | Performed by: HOSPITALIST

## 2023-10-09 PROCEDURE — 99233 SBSQ HOSP IP/OBS HIGH 50: CPT | Mod: ,,, | Performed by: HOSPITALIST

## 2023-10-09 PROCEDURE — 84300 ASSAY OF URINE SODIUM: CPT | Performed by: HOSPITALIST

## 2023-10-09 PROCEDURE — 36415 COLL VENOUS BLD VENIPUNCTURE: CPT | Performed by: HOSPITALIST

## 2023-10-09 PROCEDURE — 25000003 PHARM REV CODE 250: Performed by: PHYSICIAN ASSISTANT

## 2023-10-09 RX ADMIN — ACETAMINOPHEN 650 MG: 325 TABLET ORAL at 11:10

## 2023-10-09 RX ADMIN — HYDRALAZINE HYDROCHLORIDE 100 MG: 50 TABLET ORAL at 06:10

## 2023-10-09 RX ADMIN — ACETAMINOPHEN 650 MG: 325 TABLET ORAL at 06:10

## 2023-10-09 RX ADMIN — OXYCODONE HYDROCHLORIDE AND ACETAMINOPHEN 500 MG: 500 TABLET ORAL at 10:10

## 2023-10-09 RX ADMIN — CLOPIDOGREL BISULFATE 75 MG: 75 TABLET ORAL at 10:10

## 2023-10-09 RX ADMIN — ACETAMINOPHEN 650 MG: 325 TABLET ORAL at 12:10

## 2023-10-09 RX ADMIN — MICONAZOLE NITRATE: 20 POWDER TOPICAL at 09:10

## 2023-10-09 RX ADMIN — FLUCONAZOLE 200 MG: 200 TABLET ORAL at 10:10

## 2023-10-09 RX ADMIN — PANTOPRAZOLE SODIUM 40 MG: 40 TABLET, DELAYED RELEASE ORAL at 10:10

## 2023-10-09 RX ADMIN — GABAPENTIN 100 MG: 300 CAPSULE ORAL at 10:10

## 2023-10-09 RX ADMIN — INSULIN ASPART 5 UNITS: 100 INJECTION, SOLUTION INTRAVENOUS; SUBCUTANEOUS at 05:10

## 2023-10-09 RX ADMIN — INSULIN ASPART 2 UNITS: 100 INJECTION, SOLUTION INTRAVENOUS; SUBCUTANEOUS at 05:10

## 2023-10-09 RX ADMIN — INSULIN ASPART 3 UNITS: 100 INJECTION, SOLUTION INTRAVENOUS; SUBCUTANEOUS at 01:10

## 2023-10-09 RX ADMIN — HYDRALAZINE HYDROCHLORIDE 100 MG: 50 TABLET ORAL at 01:10

## 2023-10-09 RX ADMIN — APIXABAN 5 MG: 5 TABLET, FILM COATED ORAL at 10:10

## 2023-10-09 RX ADMIN — HYDRALAZINE HYDROCHLORIDE 100 MG: 50 TABLET ORAL at 09:10

## 2023-10-09 RX ADMIN — GABAPENTIN 100 MG: 300 CAPSULE ORAL at 09:10

## 2023-10-09 RX ADMIN — ATORVASTATIN CALCIUM 80 MG: 40 TABLET, FILM COATED ORAL at 09:10

## 2023-10-09 RX ADMIN — CEFIDEROCOL SULFATE TOSYLATE 1 G: 1 INJECTION, POWDER, FOR SOLUTION INTRAVENOUS at 12:10

## 2023-10-09 RX ADMIN — APIXABAN 5 MG: 5 TABLET, FILM COATED ORAL at 09:10

## 2023-10-09 RX ADMIN — ACETAMINOPHEN 650 MG: 325 TABLET ORAL at 05:10

## 2023-10-09 RX ADMIN — ACETAMINOPHEN 650 MG: 325 TABLET ORAL at 01:10

## 2023-10-09 RX ADMIN — THERA TABS 1 TABLET: TAB at 10:10

## 2023-10-09 RX ADMIN — INSULIN ASPART 2 UNITS: 100 INJECTION, SOLUTION INTRAVENOUS; SUBCUTANEOUS at 10:10

## 2023-10-09 RX ADMIN — INSULIN ASPART 5 UNITS: 100 INJECTION, SOLUTION INTRAVENOUS; SUBCUTANEOUS at 10:10

## 2023-10-09 RX ADMIN — CEFIDEROCOL SULFATE TOSYLATE 1 G: 1 INJECTION, POWDER, FOR SOLUTION INTRAVENOUS at 10:10

## 2023-10-09 RX ADMIN — TAMSULOSIN HYDROCHLORIDE 0.4 MG: 0.4 CAPSULE ORAL at 10:10

## 2023-10-09 RX ADMIN — INSULIN ASPART 5 UNITS: 100 INJECTION, SOLUTION INTRAVENOUS; SUBCUTANEOUS at 01:10

## 2023-10-09 RX ADMIN — MICONAZOLE NITRATE: 20 POWDER TOPICAL at 10:10

## 2023-10-09 RX ADMIN — CEFIDEROCOL SULFATE TOSYLATE 1 G: 1 INJECTION, POWDER, FOR SOLUTION INTRAVENOUS at 05:10

## 2023-10-09 RX ADMIN — OXYCODONE HYDROCHLORIDE AND ACETAMINOPHEN 500 MG: 500 TABLET ORAL at 09:10

## 2023-10-09 NOTE — PT/OT/SLP PROGRESS
Physical Therapy Co-Treatment    Patient Name:  Saji Castañeda   MRN:  [id number]  Admitting Diagnosis:  Bacteremia due to Gram-negative bacteria   Recent Surgery: * No surgery found *    Admit Date: 10/2/2023  Length of Stay: 7 days    Recommendations:     Discharge Recommendations:  nursing facility, skilled   Discharge Equipment Recommendations: to be determined by next level of care   Barriers to discharge: None    Appropriate transfer level with nursing staff: Bed in bedchair position with BUE propped on pillows. Drawsheet to cardiac chair. Ensure skin integrity via Q2 turns, waffle mattress and pressure relief boots.    Plan:     During this hospitalization, patient to be seen 3 x/week to address the identified rehab impairments via gait training, therapeutic activities, therapeutic exercises, neuromuscular re-education and progress towards the established goals.  Plan of Care Expires:  11/02/23  Plan of Care Reviewed with: patient    Assessment:     Saji Castañeda is a 73 y.o. male admitted with a medical diagnosis of Bacteremia due to Gram-negative bacteria. Pt tolerated session fairly today. He presented with improved participation of bd mobility and use of BUE for transfers. He required max cueing to maintain weightbearing precautions through BLE but was able to safely transfer to bedside chair with SBA. However pt with increased fatigue and weakness when transferring back from chair to bed and required total assist x 4 persons to safely reach supine. Pt will continue to benefit from skilled PT services during this hospital admit to continue to improve transfer ability and efficiency as well as continue to progress pt's ambulation distance and cardiopulmonary endurance to maximize pt's functional independence and return to PLOF.     Problem List: impaired endurance, weakness, impaired functional mobility, impaired self care skills, gait instability, impaired balance, decreased lower extremity function,  "decreased upper extremity function, decreased safety awareness, pain, impaired skin.  Rehab Prognosis: Fair; patient would benefit from acute skilled PT services to address these deficits and reach maximum level of function.      Goals:   Multidisciplinary Problems       Physical Therapy Goals          Problem: Physical Therapy    Goal Priority Disciplines Outcome Goal Variances Interventions   Physical Therapy Goal     PT, PT/OT Ongoing, Progressing     Description: Goals to be met by: 2023    Patient will increase functional independence with mobility by performin. Supine to sit with Ida- met 10/9  2. Bed to chair transfer with Contact Guard Assistance using LRAD - met 10/9  2a. Chair to bed transfer with SBA using LRAD including slideboard - not met  3. Wheelchair propulsion x100 feet with Supervision not met  4. Lower extremity exercise program x10 reps per handout, with independence - not met                         Subjective     RN notified prior to session. No family/friends present upon PT entrance into room. Patient agreeable to PT treatment session.    Chief Complaint: "I aint getting in that damn chair ever again"  Patient/Family Comments/goals: per pt decrease pain  Pain/Comfort:  Pain Rating 1: 0/10  Pain Rating Post-Intervention 1: 0/10      Objective:   Additional staff present: OT; OT for cotx due to pt's multiple medical comorbidities and functional deficits req'ing two skilled therapists to appropriately maximize functional potential by progressing pt's musculoskeletal strength and endurance, facilitating neuromuscular postural balance and control ,and accommodating for patient's impaired cardiopulmonary tolerance to activities.     Cognition:   Alert and Cooperative  AxOx4  Command following: Follows multistep verbal commands  Fluency: clear/fluent  General Precautions: Standard, Cardiac fall, vision impaired   Orthopedic Precautions:RLE partial weight bearing, LLE non weight " "bearing (RLE through heel with darco)   Braces:     Body mass index is 36.62 kg/m².  Oxygen Device:    Vitals: /68 (BP Location: Right arm, Patient Position: Lying)   Pulse (!) 42   Temp 98.3 °F (36.8 °C) (Oral)   Resp 20   Ht 5' 9" (1.753 m)   Wt 112.5 kg (248 lb)   SpO2 (!) 93%   BMI 36.62 kg/m²     Outcome Measures:  AM-PAC 6 CLICK MOBILITY: 12     Session 1:     Patient found HOB elevated with: telemetry, peripheral IV, telemetry, peripheral IV     Functional Mobility:    Bed Mobility:   Supine to Sit: contact guard assistance; from Lt side of bed  Scooting anteriorly to EOB to have both feet planted on floor: stand by assistance    Sitting Balance at Edge of Bed:  Static Sitting Balance: Good- : able to accept minimal resistance  Dynamic Sitting Balance: Good- : able to sit unsupported and weight shift across midline minimally  Assistance Level Required: Stand-by Assistance  Comments: Time EOB focused primarily on tolerance to upright positioning, cardiopulmonary response and endurance for activities, and strength of postural musculature to perform dynamic sitting to prepare for tasks in the home. Pt able to accept internal and external perturbations to balance while seated EOB with appropriate trunk response to remain upright    Transfers:   Bed <> Chair Transfer: Squat Pivot technique with stand by assistance with no assistive device  Chair on patient's Rt  Pt able to reach 75% of stand  Max vc's to not weight bear through LLE residual limb    Patient left up in chair with all lines intact, call button in reach, and RN notified.    Session 2: PT/OT returend to assist pt back to bed     Patient found up in chair with: telemetry, peripheral IV, telemetry, peripheral IV     Functional Mobility:    Bed Mobility:   Rolling/Turning to Left: supervision  Rolling/Turning to Right: supervision  Sit to Supine: contact guard assistance; to Lt side of bed    Sitting Balance at Edge of Bed:  Static Sitting " Balance: Good- : able to accept minimal resistance  Dynamic Sitting Balance: Good- : able to sit unsupported and weight shift across midline minimally  Assistance Level Required: Stand-by Assistance    Transfers:   Sit <> Stand Transfer: total assistance and of 2 persons with rolling walker   Stand <> Sit Transfer: total assistance and of 2 persons with rolling walker   [id number] from bedside chair, only 50% of stand unable to reach full upright, pt not pushing through RLE to stand  Chair <> Bed Transfer: Squat Pivot technique with total assistance and of 4 persons with no assistive device  Bed on patient's Lt      Patient left HOB elevated with all lines intact, call button in reach, and PCT x 2 present.    Education:  Time provided for education, counseling and discussion of health disposition in regards to patient's current status  All questions answered within PT scope of practice and to patient's satisfaction  PT role in POC to address current functional deficits  Pt educated on proper body mechanics, safety techniques, and energy conservation with PT facilitation and cueing throughout session  Whiteboard updated with therapist name and pt's current mobility status documented above  Patient is appropriate for drawsheet transfer to/from cardiac chair with nursing staff       Time Tracking:     PT Received On: 10/09/23  PT Start Time: 1419     PT Stop Time: 1442  PT Start Time: 1526     PT Stop Time: 1541  PT Total Time (min): 23 min + 15 minutes = 38 minutes total    Billable Minutes:   Therapeutic Activity 38 minutes    Treatment Type: Treatment  PT/PTA: PT       10/9/2023

## 2023-10-09 NOTE — ASSESSMENT & PLAN NOTE
Edema of leg  Vascular Surgery believes edema contributes to poor wound healing. They did not recommend amputation. they recommended compression wrappings. Consult Wound Care for this. Arterial ultrasound suggested severe stenosis of right posterior tibial artery and moderate stenosis of right anterior tibial artery. Already on medical management for PAD with clopidrogel, apixaban, atorvastatin.

## 2023-10-09 NOTE — PROGRESS NOTES
[facility] - Intensive Care ([place] [address])  Infectious Disease  Progress Note    Patient Name: Saji Castañeda  MRN: [id number]  Admission Date: 10/2/2023  Length of Stay: 7 days  Attending Physician: Milton Shelton MD  Primary Care Provider: [facility] -    Isolation Status: Contact  Assessment/Plan:      ID  * Bacteremia due to Gram-negative bacteria  73-year-old male with history of afib, T2DM, PVD s/p left foot amputation and chronic OM on IV abx (linezolid, cefepime, metronidzole) at NH presented with weakness found to have sepsis secondary to ESBL Klebsiella and  Acinetobacter bacteremia. likely source includes PICC line - also consider from left heel wound. PICC line removed 10/4/2023. Repeat blood cultures NGTD.     Cefidericol was added to blood culture report from 10/2, awaiting results of sensitivity report. Contacted micro lab today and stated unsure timeline of results. Wound culture + C. Albicans and achromobacter xylosoxidans.     Recommendations:  1. Continue cefidericol 1gq8hr and fluconazole   2. Podiatry following, no acute intervention, MARIO studies unable to obtain, recommend further imaging (CTA?) and would consider vascular surgery evaluation pending vascular studies  3. Plan reviewed with ID staff. ID will follow.               Thank you for your consult. I will follow-up with patient. Please contact us if you have any additional questions.    Dolly Gastelum NP  Infectious Disease  [facility] - [facility]ensive [facility] ([place] [address])    Subjective:     Principal Problem:Bacteremia due to Gram-negative bacteria    HPI: 73-year-old male with history of afib, T2DM, PVD s/p left foot amputation, chronic osteomyelitis, presented from nursing home with weakness. Patient found to have mobitz type 1 AV block, admitted to CCU. Patient started on dobutamine IV. ID consulted for management of osteomyelitis of left foot. He was being treated with linezolid, cefepime, metronidazole at his nursing  home. He had a left PICC line. Patient found to have Acinetobacter baumanii bacteremia. Patient reported feeling better compared to admission, denied having any pain.    Interval History:   NAEON  Blood cultures +ESBL Klebsiella and  Acinetobacter, repeats on 10/6 NGTD.   Wound cultures + C. Albicans and achromobacter xylosoxidans  Pt remains stable, afebrile. No acute complaints or concerns       Review of Systems   Constitutional:  Negative for chills, diaphoresis, fatigue and fever.   HENT: Negative.     Eyes: Negative.    Respiratory:  Negative for cough and shortness of breath.    Cardiovascular:  Negative for chest pain, palpitations and leg swelling.   Gastrointestinal:  Negative for abdominal pain, diarrhea, nausea and vomiting.   Musculoskeletal:  Negative for arthralgias, back pain and neck pain.   Skin:  Positive for wound. Negative for rash.   Neurological:  Negative for weakness, numbness and headaches.   Psychiatric/Behavioral:  Negative for agitation.      Objective:     Vital Signs (Most Recent):  Temp: 98.1 °F (36.7 °C) (10/09/23 1159)  Pulse: (!) 52 (10/09/23 1159)  Resp: 18 (10/09/23 1159)  BP: (!) 159/65 (10/09/23 1159)  SpO2: (!) 91 % (10/09/23 1159) Vital Signs (24h Range):  Temp:  [97.4 °F (36.3 °C)-98.7 °F (37.1 °C)] 98.1 °F (36.7 °C)  Pulse:  [41-54] 52  Resp:  [18] 18  SpO2:  [91 %-94 %] 91 %  BP: (154-183)/(65-76) 159/65     Weight: 112.5 kg (248 lb)  Body mass index is 36.62 kg/m².    Estimated Creatinine Clearance: 17.4 mL/min (A) (based on SCr of 4.6 mg/dL (H)).     Physical Exam  Vitals and nursing note reviewed.   Constitutional:       General: He is not in acute distress.     Appearance: Normal appearance. He is well-developed. He is not ill-appearing or diaphoretic.   HENT:      Head: Normocephalic and atraumatic.   Eyes:      Pupils: Pupils are equal, round, and reactive to light.   Cardiovascular:      Rate and Rhythm: Normal rate and regular rhythm.      Heart sounds: Normal  heart sounds. No murmur heard.  Pulmonary:      Effort: Pulmonary effort is normal. No respiratory distress.      Breath sounds: Normal breath sounds. No stridor.   Abdominal:      General: Bowel sounds are normal. There is no distension.   Musculoskeletal:         General: No deformity. Normal range of motion.      Cervical back: Normal range of motion and neck supple.   Skin:     General: Skin is warm and dry.      Findings: Lesion present. No erythema or rash.      Comments: RIJ line in place c/d/I  Picc line removed    Bilateral heel wounds dressed. Refer to media    Neurological:      Mental Status: He is alert and oriented to person, place, and time.   Psychiatric:         Mood and Affect: Mood normal.         Behavior: Behavior normal.         Thought Content: Thought content normal.         Judgment: Judgment normal.          Significant Labs: All pertinent labs within the past 24 hours have been reviewed.    Significant Imaging: I have reviewed all pertinent imaging results/findings within the past 24 hours.

## 2023-10-09 NOTE — ASSESSMENT & PLAN NOTE
73-year-old male with history of afib, T2DM, PVD s/p left foot amputation and chronic OM on IV abx (linezolid, cefepime, metronidzole) at NH presented with weakness found to have sepsis secondary to ESBL Klebsiella and  Acinetobacter bacteremia. likely source includes PICC line - also consider from left heel wound. PICC line removed 10/4/2023. Repeat blood cultures NGTD.     Cefidericol was added to blood culture report from 10/2, awaiting results of sensitivity report. Contacted micro lab today and stated unsure timeline of results. Wound culture + C. Albicans and achromobacter xylosoxidans.     Recommendations:  1. Continue cefidericol 1gq8hr and fluconazole   2. Podiatry following, no acute intervention, MARIO studies unable to obtain, recommend further imaging (CTA?) and would consider vascular surgery evaluation pending vascular studies  3. Plan reviewed with ID staff. ID will follow.

## 2023-10-09 NOTE — PT/OT/SLP PROGRESS
"Speech Language Pathology Treatment    Patient Name:  Saji Castañeda   MRN:  [id number]  Admitting Diagnosis: Bacteremia due to Gram-negative bacteria    Recommendations:                 General Recommendations:  Cognitive-linguistic therapy  Diet recommendations:  Soft & Bite Sized Diet - IDDSI Level 6, Liquid Diet Level: Thin liquids - IDDSI Level 0   Aspiration Precautions: Standard aspiration precautions   General Precautions: Standard, fall, vision impaired  Communication strategies:  none    Assessment:     Saji Castañeda is a 73 y.o. male with an SLP diagnosis of Cognitive-Linguistic Impairment and possible visual spatial impairment.     Subjective     "Blurry on the left." Pt stated when SLP inquired about recent changes in vision.     Pain/Comfort:  Pain Rating 1: 0/10    Respiratory Status: Room air    Objective:     Has the patient been evaluated by SLP for swallowing?   No  Keep patient NPO? No   Current Respiratory Status:        Pt seen for continued cognitive-linguistic tx.  Pt participated in ongoing assessment of reading abilities. Pt provided additional information regarding history. He stated he was not a resident at [facility], but had been there for about 3 weeks to receive PT/OT. Pt reports living with siblings prior to recent hospitalizations. Pt having difficulty reading written items on white board in room.  Pt denies having to wear reading glasses premorbidly and is reporting new onset of blurry vision on the left side.  When reading from a handout, pt read sentence in bold print  (approx 12pt font) with 2 errors. Pt was unable to read smaller font in lighter print.  Pt was O x 4.  Pt followed 3-step commands with 50% accuracy IND/100% given cues.  Following a 2 minute delay, pt recalled 3/3 novel items IND.  Pt completed a mental manipulation/ranking task with 33% accuracy IND/100% given mod-max cues. Education was provided to pt regarding role of SLP, assessment of reading and visual " scanning abilities, memory tasks, auditory processing tasks, mental manipulation tasks, and SLP treatment plan and POC.  Pt demonstrated understanding of education provided, but will benefit from continued reinforcement.       Goals:   Multidisciplinary Problems       SLP Goals          Problem: SLP    Goal Priority Disciplines Outcome   SLP Goal     SLP    Description: Speech Language Pathology Goals  Goals expected to be met by 10/12    1. Pt will recall 3 novel items after 2 minute delay given mod cues.  2. Pt will complete immediate memory tasks with 70% accuracy given min cues.  3. Pt will follow 3-step commands with 70% accuracy given mod cues.  4. Pt will list 7 items from a category in 1 minute given min cues.   5. Pt will participate in assessment for reading, writing, and visuospatial abilities.                               Plan:     Patient to be seen:  3 x/week   Plan of Care expires:  11/02/23  Plan of Care reviewed with:  patient   SLP Follow-Up:  Yes       Discharge recommendations:  nursing facility, skilled     Time Tracking:     SLP Treatment Date:   10/09/23  Speech Start Time:  1139  Speech Stop Time:  1157     Speech Total Time (min):  18 min    Billable Minutes: Speech Therapy Individual (cognitive therapy) 10 and Self Care/Home Management Training 8    10/09/2023

## 2023-10-09 NOTE — ASSESSMENT & PLAN NOTE
Monitor labs. Check BNP, urinalysis, urine sodium, urine creatinine, urine protein/creatinine ratio, urine eosinophils.

## 2023-10-09 NOTE — PT/OT/SLP PROGRESS
"Occupational Therapy   Co-Treatment  Co-treatment performed due to patient's multiple deficits requiring two skilled therapists to appropriately and safely assess patient's strength and endurance while facilitating functional tasks in addition to accommodating for patient's activity tolerance.     Name: Saji Castañeda  MRN: [id number]  Admitting Diagnosis:  Bacteremia due to Gram-negative bacteria       Recommendations:     Discharge Recommendations: nursing facility, skilled  Discharge Equipment Recommendations:   (TBD)  Barriers to discharge:       Assessment:     Saji Castañeda is a 73 y.o. male with a medical diagnosis of Bacteremia due to Gram-negative bacteria.  He presents with the following performance deficits affecting function: weakness, impaired functional mobility, gait instability, impaired endurance, impaired balance, impaired self care skills, decreased lower extremity function, decreased upper extremity function, decreased safety awareness, impaired skin, orthopedic precautions, decreased coordination.     Pt agreeable to therapy and tolerated session well. Pt requiring significant assistance for transfer from chair. Pt transfer bed>chair required light assistance but chair>bed requiring max assistance of multiple persons. Pt with no c/o pain or dizziness.     Rehab Prognosis:  Good; patient would benefit from acute skilled OT services to address these deficits and reach maximum level of function.       Plan:     Patient to be seen 3 x/week to address the above listed problems via self-care/home management, therapeutic activities, therapeutic exercises, neuromuscular re-education  Plan of Care Expires: 11/04/23  Plan of Care Reviewed with: patient    Subjective     Chief Complaint: weakness  Patient/Family Comments/goals: "man what happened to me?"  Pain/Comfort:  Pain Rating 1: 0/10  Pain Rating Post-Intervention 1: 0/10    Objective:     Communicated with: RN prior to session.  Patient found supine " with telemetry, Condom Catheter, peripheral IV (swan) upon OT entry to room.  A client care conference was completed by the OTR and the TAYLOR prior to treatment by the TAYLOR to discuss the patient's POC and current status.    General Precautions: Standard, fall, vision impaired    Orthopedic Precautions:RLE partial weight bearing, LLE non weight bearing (in heel with DARCO shoe, transfers only)  Braces:  (darco)  Respiratory Status: Room air     Occupational Performance:     Bed Mobility:    Patient completed Rolling/Turning to Left with  supervision  Patient completed Rolling/Turning to Right with supervision  Patient completed Supine to Sit with stand by assistance  Patient completed Sit to Supine with contact guard assistance     Functional Mobility/Transfers:  Patient completed Sit <> Stand Transfer with maximal assistance and of 2 persons  with  rolling walker, unsuccessful  Patient completed Bed <> Chair Transfer using Squat Pivot technique with stand by assistance, maximal assistance, and of 4 persons with no assistive device  From bed >chair, SBA squat pivot transfer  Chair > bed pt requiring max A of 4 persons for squat pivot transfer back to bed. Pt unsuccessfully pushing through R heel for transfer.    Activities of Daily Living:  Toileting: maximal assistance with rolling in bed      [facility] 6 Click ADL: 17    Treatment & Education:  Pt educated on OT POC and frequency during hospital stay.     Pt educated on proper hand placement and techniques for RW mgmt to improve safety awareness.     Addressed all patient questions/concerns within TAYLOR scope of practice.    Patient left supine with all lines intact, call button in reach, and PCTs notified    GOALS:   Multidisciplinary Problems       Occupational Therapy Goals          Problem: Occupational Therapy    Goal Priority Disciplines Outcome Interventions   Occupational Therapy Goal     OT, PT/OT Ongoing, Progressing    Description: Goals to be met by:  10-15-23     Patient will increase functional independence with ADLs by performing:    UE Dressing with Set-up Assistance.  LE Dressing with Supervision in supine  Grooming while seated with Set-up Assistance.  Toileting from drop arm commode with Minimal Assistance for hygiene and clothing management.   Supine to sit with Supervision.  Squat pivot transfers with Min A in darco shoe with WB through R heel only in darco  Toilet transfer to drop arm commode with Minimal Assistance.  Pt. To be I with HEP in BUE to improve level of endurance                         Time Tracking:     OT Date of Treatment: 10/09/23  OT Start Time: 1419  OT Stop Time: 1441  OT Start Time: 1526  OT Stop Time: 1542  OT Total Time (min): 38 min    Billable Minutes:Self Care/Home Management 12  Neuromuscular Re-education 26    OT/KATHLEEN: KATHLEEN     Number of KATHLEEN visits since last OT visit: 1    10/9/2023

## 2023-10-09 NOTE — PLAN OF CARE
Discharge Recommendation: SNF.    2 goals met today. PT goals appropriate.    Lissett Ferreira, PT, DPT  10/9/2023  Pager: [phone]    Problem: Physical Therapy  Goal: Physical Therapy Goal  Description: Goals to be met by: 2023    Patient will increase functional independence with mobility by performin. Supine to sit with [place]- met 10/9  2. Bed to chair transfer with Contact Guard Assistance using LRAD - met 10/9  2a. Chair to bed transfer with SBA using LRAD including slideboard - not met  3. Wheelchair propulsion x100 feet with Supervision not met  4. Lower extremity exercise program x10 reps per handout, with independence - not met    Outcome: Ongoing, Progressing

## 2023-10-09 NOTE — SUBJECTIVE & OBJECTIVE
Interval History: Creatinine increased again today. He reported left eye blurry vision but says it is chronic and has been present for a long time. He said he does not get any outpatient management of his chronic leg edema.     Review of Systems   Constitutional:  Negative for chills and fever.   Neurological:  Negative for seizures and syncope.     Objective:     Vital Signs (Most Recent):  Temp: 98.1 °F (36.7 °C) (10/09/23 1159)  Pulse: (!) 52 (10/09/23 1159)  Resp: 18 (10/09/23 1159)  BP: (!) 159/65 (10/09/23 1159)  SpO2: (!) 91 % (10/09/23 1159) Vital Signs (24h Range):  Temp:  [97.4 °F (36.3 °C)-98.7 °F (37.1 °C)] 98.1 °F (36.7 °C)  Pulse:  [41-54] 52  Resp:  [18] 18  SpO2:  [91 %-94 %] 91 %  BP: (154-183)/(65-76) 159/65     Weight: 112.5 kg (248 lb)  Body mass index is 36.62 kg/m².    Intake/Output Summary (Last 24 hours) at 10/9/2023 1459  Last data filed at 10/9/2023 0655  Gross per 24 hour   Intake 350 ml   Output 1425 ml   Net -1075 ml           Physical Exam  Vitals and nursing note reviewed.   Constitutional:       General: He is not in acute distress.     Appearance: He is well-developed. He is obese. He is not diaphoretic.   Musculoskeletal:      Right lower leg: Edema present.      Left lower leg: Edema present.   Skin:     General: Skin is warm and dry.      Coloration: Skin is not jaundiced or pale.   Neurological:      Mental Status: He is alert and oriented to person, place, and time. Mental status is at baseline.      Motor: No seizure activity.   Psychiatric:         Attention and Perception: Attention normal.         Mood and Affect: Affect normal.         Behavior: Behavior is not aggressive or combative.             Significant Labs: All pertinent labs within the past 24 hours have been reviewed.    Significant Imaging: I have reviewed all pertinent imaging results/findings within the past 24 hours.

## 2023-10-09 NOTE — PROGRESS NOTES
[facility] - Intensive Care ([address] Medicine  Progress Note    Patient Name: Saji Castañeda  MRN: [id number]  Patient Class: IP- Inpatient   Admission Date: 10/2/2023  Length of Stay: 7 days  Attending Physician: Milton Shelton MD  Primary Care Provider: [facility] -        Subjective:     Principal Problem:Bacteremia due to Gram-negative bacteria        HPI:  Saji Castañeda is a 73 year old Black man with hypertension, hyperlipidemia, diabetes mellitus type 2 with neuropathy, left ventricular concentric hypertrophy, paroxysmal atrial fibrillation (anticoagulated on apixaban), Mobitz type I atrioventricular heart block, chronic kidney disease stage 3, chronic venous hypertension, peripheral vascular disease, 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 He lives in [place], [place].               He presented to [facility] on 10/2/2023 from [facility] due to concerns of weakness and feeling like he was going to die. He reported no dizziness or headaches. He knew his name, where he was, and the current year but had trouble remembering which nursing home he came from. Blood glucose was in the 20s. He was given dextrose in the emergency department and glucose improved to 96 mg/dL. Labs showed acute kidney injury with creatinine of 2.6 mg/dL from baseline of 1.3 and lactic acid elevated at 2.29 mmol/L. He had complete heart block. He was admitted to Cardiology.      Overview/Hospital Course:  Right internal jugular central venous catheter was placed. He was found to have acute osteomyelitis of the posterior aspect of the calcaneus. He had hypotension concerning for septic shock. He was started on dobutamine, vancomycin, and piperacillin-tazobactam. Blood  culture grew Gram negative rods. PCR was positive for Acinetobacter and Klebsiella pneumoniae. He had aphasia. Head CT and brain MRI showed no acute stroke. Infectious Disease was consulted as he had been on linezolid, cefepime, and metronidazole for osteomyelitis. PICC line was removed and IV access was replaced in anticipation for long-term IV antibiotics. Podiatry was consulted and recommended X-rays and Wound Care consult. Wound was cultured. He had acute on chronic renal failure. Renal ultrasound showed no obstruction. He was given fluid resuscitation. Heart rate was able to augment appropriately with exertion with transmission of P waves. Repeat EKG showed 2nd degree AV block replacing junctional rhythm. Electrophysiology recommended pacemaker placement. Vascular Surgery suspected poor wound healing to be due to severe edema. They ordered ankle brachial indices but it was unable to be done due to the hardness of his skin. They did not recommend amputation based on their physical exam findings. He was transferred to Hospital Medicine Team D on 10/5/2023. Infectious Disease changed piperacillin-tazobactam to cefidericol and fluconazole. He continued to have asymptomatic bradycardia. Foot wound culture grew Achromobacter xylosoxidans dentrificans and Candida albicans. Lower extremity arterial ultrasound suggested moderate stenosis of the right anterior tibial artery and severe stenosis of the right posterior tibial artery. Vascular Surgery reviewed this and felt it would not hinder his ability to heal. Electrophysiology reviewed his EKG on 10/8/2023 and noted 2nd degree heart block Mobitz I.       Interval History: Creatinine increased again today. He reported left eye blurry vision but says it is chronic and has been present for a long time. He said he does not get any outpatient management of his chronic leg edema.     Review of Systems   Constitutional:  Negative for chills and fever.   Neurological:  Negative  for seizures and syncope.     Objective:     Vital Signs (Most Recent):  Temp: 98.1 °F (36.7 °C) (10/09/23 1159)  Pulse: (!) 52 (10/09/23 1159)  Resp: 18 (10/09/23 1159)  BP: (!) 159/65 (10/09/23 1159)  SpO2: (!) 91 % (10/09/23 1159) Vital Signs (24h Range):  Temp:  [97.4 °F (36.3 °C)-98.7 °F (37.1 °C)] 98.1 °F (36.7 °C)  Pulse:  [41-54] 52  Resp:  [18] 18  SpO2:  [91 %-94 %] 91 %  BP: (154-183)/(65-76) 159/65     Weight: 112.5 kg (248 lb)  Body mass index is 36.62 kg/m².    Intake/Output Summary (Last 24 hours) at 10/9/2023 6386  Last data filed at 10/9/2023 0655  Gross per 24 hour   Intake 350 ml   Output 1425 ml   Net -1075 ml           Physical Exam  Vitals and nursing note reviewed.   Constitutional:       General: He is not in acute distress.     Appearance: He is well-developed. He is obese. He is not diaphoretic.   Musculoskeletal:      Right lower leg: Edema present.      Left lower leg: Edema present.   Skin:     General: Skin is warm and dry.      Coloration: Skin is not jaundiced or pale.   Neurological:      Mental Status: He is alert and oriented to person, place, and time. Mental status is at baseline.      Motor: No seizure activity.   Psychiatric:         Attention and Perception: Attention normal.         Mood and Affect: Affect normal.         Behavior: Behavior is not aggressive or combative.             Significant Labs: All pertinent labs within the past 24 hours have been reviewed.    Significant Imaging: I have reviewed all pertinent imaging results/findings within the past 24 hours.        Assessment/Plan:      * Bacteremia due to Gram-negative bacteria  On cefidericol and fluconazole. Appreciate Infectious Disease.    Blurred vision, left eye  Reports this to be chronic. Follow up with an ophthalmologist.    History of complete heart block  AV block, 2nd degree  Electrophysiology recommends pacemaker.    Paroxysmal atrial fibrillation  Continue home apixaban.     AV block, 2nd degree  Seen  by Electrophysiology, who recommend pacemaker placement.    Nonhealing ulcer of right lower leg with fat layer exposed  Edema of leg  Vascular Surgery believes edema contributes to poor wound healing. They did not recommend amputation. they recommended compression wrappings. Consult Wound Care for this. Arterial ultrasound suggested severe stenosis of right posterior tibial artery and moderate stenosis of right anterior tibial artery. Already on medical management for PAD with clopidrogel, apixaban, atorvastatin.     Chronic deep vein thrombosis (DVT) of right upper extremity  Continue home apixaban.    Peripheral arterial disease  Continue home clopidrogel and atorvastatin. Evaluated by Interventional Cardiology in May and deemed not to be a revascularization candidate due to extensive disease, multiple comorbidities, debility/bed bound status and concern for noncompliance.    Acute on chronic renal failure  Monitor labs. Check BNP, urinalysis, urine sodium, urine creatinine, urine protein/creatinine ratio, urine eosinophils.    Insulin dependent type 2 diabetes mellitus  Holding home glipizide. He takes insulin glargine 45 units HS and insulin aspart 12 units TID. Giving insulin detemir 15 units HS, insulin aspart 5 units TID and sliding scale. Increase insulin detemir to 20 units HS. Monitor Accuchecks.      VTE Risk Mitigation (From admission, onward)         Ordered     apixaban tablet 5 mg  2 times daily         10/02/23 1544     IP VTE HIGH RISK PATIENT  Once         10/02/23 1513     Place sequential compression device  Until discontinued         10/02/23 1513                Discharge Planning   OXANA: 10/12/2023     Code Status: Full Code   Is the patient medically ready for discharge?: No    Reason for patient still in hospital (select all that apply): Patient trending condition and Treatment  Discharge Plan A: Home with family, Home Health                  Milton Shelton MD  Department of Hospital  Medicine   [facility] - [facility] ([place]-)

## 2023-10-09 NOTE — PLAN OF CARE
[facility] - [facility] ([facility])  Discharge Reassessment    Primary Care Provider: [facility] -    Expected Discharge Date: 10/12/2023    Per MDRs, patient not medically ready for discharge today.  SW met with patient at bedside regarding PT/OT recommendations Skilled Nursing Facility Placement.  Patient declined SNF placement at this time.  Patient states he will prefer home with home health and choice is previous agency.  [facility]    Reassessment (most recent)       Discharge Reassessment - 10/09/23 1632          Discharge Reassessment    Assessment Type Discharge Planning Reassessment (P)      Did the patient's condition or plan change since previous assessment? Yes (P)      Discharge Plan discussed with: Patient (P)      Communicated OXANA with patient/caregiver Date not available/Unable to determine (P)      Discharge Plan A Home Health (P)      Discharge Plan B Skilled Nursing Facility (P)      DME Needed Upon Discharge  none (P)      Transition of Care Barriers None (P)      Why the patient remains in the hospital Requires continued medical care (P)         Post-Acute Status    Post-Acute Authorization Home Health (P)      Home Health Status Pending medical clearance/testing (P)      Patient choice form signed by patient/caregiver List with quality metrics by geographic area provided (P)

## 2023-10-09 NOTE — SUBJECTIVE & OBJECTIVE
Interval History:   NAEON  Blood cultures +ESBL Klebsiella and  Acinetobacter, repeats on 10/6 NGTD.   Wound cultures + C. Albicans and achromobacter xylosoxidans  Pt remains stable, afebrile. No acute complaints or concerns       Review of Systems   Constitutional:  Negative for chills, diaphoresis, fatigue and fever.   HENT: Negative.     Eyes: Negative.    Respiratory:  Negative for cough and shortness of breath.    Cardiovascular:  Negative for chest pain, palpitations and leg swelling.   Gastrointestinal:  Negative for abdominal pain, diarrhea, nausea and vomiting.   Musculoskeletal:  Negative for arthralgias, back pain and neck pain.   Skin:  Positive for wound. Negative for rash.   Neurological:  Negative for weakness, numbness and headaches.   Psychiatric/Behavioral:  Negative for agitation.      Objective:     Vital Signs (Most Recent):  Temp: 98.1 °F (36.7 °C) (10/09/23 1159)  Pulse: (!) 52 (10/09/23 1159)  Resp: 18 (10/09/23 1159)  BP: (!) 159/65 (10/09/23 1159)  SpO2: (!) 91 % (10/09/23 1159) Vital Signs (24h Range):  Temp:  [97.4 °F (36.3 °C)-98.7 °F (37.1 °C)] 98.1 °F (36.7 °C)  Pulse:  [41-54] 52  Resp:  [18] 18  SpO2:  [91 %-94 %] 91 %  BP: (154-183)/(65-76) 159/65     Weight: 112.5 kg (248 lb)  Body mass index is 36.62 kg/m².    Estimated Creatinine Clearance: 17.4 mL/min (A) (based on SCr of 4.6 mg/dL (H)).     Physical Exam  Vitals and nursing note reviewed.   Constitutional:       General: He is not in acute distress.     Appearance: Normal appearance. He is well-developed. He is not ill-appearing or diaphoretic.   HENT:      Head: Normocephalic and atraumatic.   Eyes:      Pupils: Pupils are equal, round, and reactive to light.   Cardiovascular:      Rate and Rhythm: Normal rate and regular rhythm.      Heart sounds: Normal heart sounds. No murmur heard.  Pulmonary:      Effort: Pulmonary effort is normal. No respiratory distress.      Breath sounds: Normal breath sounds. No stridor.    Abdominal:      General: Bowel sounds are normal. There is no distension.   Musculoskeletal:         General: No deformity. Normal range of motion.      Cervical back: Normal range of motion and neck supple.   Skin:     General: Skin is warm and dry.      Findings: Lesion present. No erythema or rash.      Comments: RIJ line in place c/d/I  Picc line removed    Bilateral heel wounds dressed. Refer to media    Neurological:      Mental Status: He is alert and oriented to person, place, and time.   Psychiatric:         Mood and Affect: Mood normal.         Behavior: Behavior normal.         Thought Content: Thought content normal.         Judgment: Judgment normal.          Significant Labs: All pertinent labs within the past 24 hours have been reviewed.    Significant Imaging: I have reviewed all pertinent imaging results/findings within the past 24 hours.

## 2023-10-10 PROBLEM — A49.9 BACTERIAL UTI: Status: ACTIVE | Noted: 2023-10-10

## 2023-10-10 PROBLEM — N39.0 BACTERIAL UTI: Status: ACTIVE | Noted: 2023-10-10

## 2023-10-10 LAB
ALBUMIN SERPL BCP-MCNC: 2.2 G/DL (ref 3.5–5.2)
ANION GAP SERPL CALC-SCNC: 8 MMOL/L (ref 8–16)
BACTERIA #/AREA URNS AUTO: ABNORMAL /HPF
BILIRUB UR QL STRIP: NEGATIVE
BUN SERPL-MCNC: 54 MG/DL (ref 8–23)
CALCIUM SERPL-MCNC: 8.3 MG/DL (ref 8.7–10.5)
CHLORIDE SERPL-SCNC: 109 MMOL/L (ref 95–110)
CLARITY UR REFRACT.AUTO: ABNORMAL
CO2 SERPL-SCNC: 22 MMOL/L (ref 23–29)
COLOR UR AUTO: YELLOW
CREAT SERPL-MCNC: 4.5 MG/DL (ref 0.5–1.4)
EOSINOPHIL URNS QL WRIGHT STN: ABNORMAL
EST. GFR  (NO RACE VARIABLE): 13 ML/MIN/1.73 M^2
GLUCOSE SERPL-MCNC: 161 MG/DL (ref 70–110)
GLUCOSE UR QL STRIP: NEGATIVE
HGB UR QL STRIP: ABNORMAL
HYALINE CASTS UR QL AUTO: 2 /LPF
KETONES UR QL STRIP: NEGATIVE
LEUKOCYTE ESTERASE UR QL STRIP: ABNORMAL
MICROSCOPIC COMMENT: ABNORMAL
NITRITE UR QL STRIP: NEGATIVE
PH UR STRIP: 6 [PH] (ref 5–8)
PHOSPHATE SERPL-MCNC: 3.5 MG/DL (ref 2.7–4.5)
POCT GLUCOSE: 148 MG/DL (ref 70–110)
POCT GLUCOSE: 165 MG/DL (ref 70–110)
POCT GLUCOSE: 180 MG/DL (ref 70–110)
POCT GLUCOSE: 196 MG/DL (ref 70–110)
POTASSIUM SERPL-SCNC: 5 MMOL/L (ref 3.5–5.1)
PROT UR QL STRIP: ABNORMAL
RBC #/AREA URNS AUTO: 2 /HPF (ref 0–4)
SODIUM SERPL-SCNC: 139 MMOL/L (ref 136–145)
SP GR UR STRIP: 1.02 (ref 1–1.03)
SQUAMOUS #/AREA URNS AUTO: 1 /HPF
URN SPEC COLLECT METH UR: ABNORMAL
WBC #/AREA URNS AUTO: 97 /HPF (ref 0–5)

## 2023-10-10 PROCEDURE — 25000003 PHARM REV CODE 250: Performed by: INTERNAL MEDICINE

## 2023-10-10 PROCEDURE — 99223 1ST HOSP IP/OBS HIGH 75: CPT | Mod: GC,,, | Performed by: INTERNAL MEDICINE

## 2023-10-10 PROCEDURE — 92507 TX SP LANG VOICE COMM INDIV: CPT

## 2023-10-10 PROCEDURE — 97535 SELF CARE MNGMENT TRAINING: CPT

## 2023-10-10 PROCEDURE — 99223 PR INITIAL HOSPITAL CARE,LEVL III: ICD-10-PCS | Mod: GC,,, | Performed by: INTERNAL MEDICINE

## 2023-10-10 PROCEDURE — 25000003 PHARM REV CODE 250: Performed by: HOSPITALIST

## 2023-10-10 PROCEDURE — 63600175 PHARM REV CODE 636 W HCPCS: Mod: JZ,TB | Performed by: PHYSICIAN ASSISTANT

## 2023-10-10 PROCEDURE — 21400001 HC TELEMETRY ROOM

## 2023-10-10 PROCEDURE — 27000207 HC ISOLATION

## 2023-10-10 PROCEDURE — 25000003 PHARM REV CODE 250

## 2023-10-10 PROCEDURE — 63600175 PHARM REV CODE 636 W HCPCS: Performed by: HOSPITALIST

## 2023-10-10 PROCEDURE — 36415 COLL VENOUS BLD VENIPUNCTURE: CPT | Performed by: HOSPITALIST

## 2023-10-10 PROCEDURE — 25000003 PHARM REV CODE 250: Performed by: PHYSICIAN ASSISTANT

## 2023-10-10 PROCEDURE — 99233 PR SUBSEQUENT HOSPITAL CARE,LEVL III: ICD-10-PCS | Mod: ,,, | Performed by: HOSPITALIST

## 2023-10-10 PROCEDURE — 99233 PR SUBSEQUENT HOSPITAL CARE,LEVL III: ICD-10-PCS | Mod: ,,, | Performed by: REGISTERED NURSE

## 2023-10-10 PROCEDURE — 99233 SBSQ HOSP IP/OBS HIGH 50: CPT | Mod: ,,, | Performed by: REGISTERED NURSE

## 2023-10-10 PROCEDURE — 99233 SBSQ HOSP IP/OBS HIGH 50: CPT | Mod: ,,, | Performed by: HOSPITALIST

## 2023-10-10 PROCEDURE — 80069 RENAL FUNCTION PANEL: CPT | Performed by: HOSPITALIST

## 2023-10-10 RX ORDER — SODIUM CHLORIDE 9 MG/ML
INJECTION, SOLUTION INTRAVENOUS CONTINUOUS
Status: DISCONTINUED | OUTPATIENT
Start: 2023-10-10 | End: 2023-10-10

## 2023-10-10 RX ADMIN — CEFIDEROCOL SULFATE TOSYLATE 1 G: 1 INJECTION, POWDER, FOR SOLUTION INTRAVENOUS at 06:10

## 2023-10-10 RX ADMIN — APIXABAN 5 MG: 5 TABLET, FILM COATED ORAL at 09:10

## 2023-10-10 RX ADMIN — HYDRALAZINE HYDROCHLORIDE 100 MG: 50 TABLET ORAL at 09:10

## 2023-10-10 RX ADMIN — TAMSULOSIN HYDROCHLORIDE 0.4 MG: 0.4 CAPSULE ORAL at 09:10

## 2023-10-10 RX ADMIN — ATORVASTATIN CALCIUM 80 MG: 40 TABLET, FILM COATED ORAL at 09:10

## 2023-10-10 RX ADMIN — GABAPENTIN 100 MG: 300 CAPSULE ORAL at 09:10

## 2023-10-10 RX ADMIN — INSULIN ASPART 5 UNITS: 100 INJECTION, SOLUTION INTRAVENOUS; SUBCUTANEOUS at 09:10

## 2023-10-10 RX ADMIN — OXYCODONE HYDROCHLORIDE AND ACETAMINOPHEN 500 MG: 500 TABLET ORAL at 09:10

## 2023-10-10 RX ADMIN — THERA TABS 1 TABLET: TAB at 09:10

## 2023-10-10 RX ADMIN — FLUCONAZOLE 200 MG: 200 TABLET ORAL at 09:10

## 2023-10-10 RX ADMIN — CEFIDEROCOL SULFATE TOSYLATE 1 G: 1 INJECTION, POWDER, FOR SOLUTION INTRAVENOUS at 09:10

## 2023-10-10 RX ADMIN — HYDRALAZINE HYDROCHLORIDE 100 MG: 50 TABLET ORAL at 02:10

## 2023-10-10 RX ADMIN — PANTOPRAZOLE SODIUM 40 MG: 40 TABLET, DELAYED RELEASE ORAL at 09:10

## 2023-10-10 RX ADMIN — CEFIDEROCOL SULFATE TOSYLATE 1 G: 1 INJECTION, POWDER, FOR SOLUTION INTRAVENOUS at 12:10

## 2023-10-10 RX ADMIN — MICONAZOLE NITRATE: 20 POWDER TOPICAL at 09:10

## 2023-10-10 RX ADMIN — CLOPIDOGREL BISULFATE 75 MG: 75 TABLET ORAL at 09:10

## 2023-10-10 RX ADMIN — ACETAMINOPHEN 650 MG: 325 TABLET ORAL at 06:10

## 2023-10-10 RX ADMIN — INSULIN ASPART 5 UNITS: 100 INJECTION, SOLUTION INTRAVENOUS; SUBCUTANEOUS at 06:10

## 2023-10-10 RX ADMIN — ACETAMINOPHEN 650 MG: 325 TABLET ORAL at 05:10

## 2023-10-10 RX ADMIN — INSULIN ASPART 5 UNITS: 100 INJECTION, SOLUTION INTRAVENOUS; SUBCUTANEOUS at 01:10

## 2023-10-10 RX ADMIN — HYDRALAZINE HYDROCHLORIDE 100 MG: 50 TABLET ORAL at 05:10

## 2023-10-10 NOTE — SUBJECTIVE & OBJECTIVE
Interval History: Urinalysis showed WBCs and bacteria, similar to 2 others done since admission. FE Urea suggests intrinsic YNES. Will consult Nephrology as renal function has only worsened since admission.     Review of Systems   Constitutional:  Negative for chills and fever.   Neurological:  Negative for seizures and syncope.     Objective:     Vital Signs (Most Recent):  Temp: 98 °F (36.7 °C) (10/10/23 0801)  Pulse: (!) 45 (10/10/23 0801)  Resp: 18 (10/10/23 0801)  BP: (!) 160/71 (10/10/23 0801)  SpO2: (!) 93 % (10/10/23 0801) Vital Signs (24h Range):  Temp:  [97.3 °F (36.3 °C)-98.6 °F (37 °C)] 98 °F (36.7 °C)  Pulse:  [38-57] 45  Resp:  [18-20] 18  SpO2:  [91 %-94 %] 93 %  BP: (136-179)/(64-77) 160/71     Weight: 112.5 kg (248 lb)  Body mass index is 36.62 kg/m².    Intake/Output Summary (Last 24 hours) at 10/10/2023 0811  Last data filed at 10/10/2023 0516  Gross per 24 hour   Intake 350 ml   Output 350 ml   Net 0 ml           Physical Exam  Vitals and nursing note reviewed.   Constitutional:       General: He is not in acute distress.     Appearance: He is well-developed. He is obese. He is not diaphoretic.   Musculoskeletal:      Right lower leg: Edema present.      Left lower leg: Edema present.   Skin:     General: Skin is warm and dry.      Coloration: Skin is not jaundiced or pale.   Neurological:      Mental Status: He is alert and oriented to person, place, and time. Mental status is at baseline.      Motor: No seizure activity.   Psychiatric:         Attention and Perception: Attention normal.         Mood and Affect: Affect normal.         Behavior: Behavior is not aggressive or combative.             Significant Labs: All pertinent labs within the past 24 hours have been reviewed.  Recent Labs   Lab 10/04/23  0224 10/05/23  0234 10/07/23  0959 10/08/23  0543 10/09/23  0629    136 138 139 138   K 3.9 4.4 4.6 5.0 5.0    105 106 108 108   CO2 25 24 22* 21* 20*   BUN 46* 44* 51* 51* 51*    CREATININE 3.5* 3.7* 4.4* 4.1* 4.6*   CALCIUM 8.5* 8.7 8.5* 8.5* 8.5*   PROT 6.2 6.2  --   --   --    BILITOT 0.3 0.3  --   --   --    ALKPHOS 42* 46*  --   --   --    ALT 11 10  --   --   --    AST 14 17  --   --   --          Significant Imaging: I have reviewed all pertinent imaging results/findings within the past 24 hours.

## 2023-10-10 NOTE — CONSULTS
[facility] - [facility] ([place] [address])  Nephrology  Consult Note    Patient Name: Saji Castañeda  MRN: [id number]  Admission Date: 10/2/2023  Hospital Length of Stay: 8 days  Attending Provider: Milton Shelton MD   [facility] Physician: [facility] -  Principal Problem:Bacteremia due to Gram-negative bacteria    Inpatient consult to Nephrology  Consult performed by: Terrell Mcdonnell DO  Consult ordered by: Milton Shelton MD        Subjective:     HPI: 73 year old Black man with hypertension, hyperlipidemia, diabetes mellitus type 2 with neuropathy, left ventricular concentric hypertrophy, paroxysmal atrial fibrillation (anticoagulated on apixaban), Mobitz type I atrioventricular heart block, chronic kidney disease stage 3, chronic venous hypertension, peripheral vascular disease, 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 Patient presnted to [facility] with complaints of fatigue. He was found to be in complete heart block and hypotensive, Additionally noted to have acute osteomyelitis of the posterior aspect of the calcaneus. He had hypotension concerning for septic shock. He was started on dobutamine, vancomycin, and piperacillin-tazobactam. Blood culture grew Acinetobacter baumannii and Klebsiella pneumoniae. Hosptial course complicated by YNES, for which nephrology was consulted for.       Past Medical History:   Diagnosis Date    Arthritis     legs    Diabetes mellitus     Diabetes mellitus, type 2     Hyperlipidemia     Hypertension     Osteomyelitis     Palliative care encounter 5/24/2023       Past Surgical History:   Procedure Laterality Date    ANGIOGRAPHY OF LOWER EXTREMITY N/A 2/3/2021    Procedure: Angiogram Extremity Bilateral;  Surgeon: Ernst Chacko MD;  Location: [facility]  OR 2ND FLR;  Service: Peripheral Vascular;  Laterality: N/A;  7.4 mintues fluoroscopy time  816.15 mGy  170.17 Gycm2    AORTOGRAPHY WITH EXTREMITY RUNOFF Bilateral 2/3/2021    Procedure: AORTOGRAM, WITH EXTREMITY RUNOFF;  Surgeon: Ernst Chacko MD;  Location: [facility] OR 2ND FLR;  Service: Peripheral Vascular;  Laterality: Bilateral;    DEBRIDEMENT OF FOOT Left 2/23/2021    Procedure: DEBRIDEMENT, LEFT HEEL;  Surgeon: Mayra Schroeder DPM;  Location: [facility] OR 1ST FLR;  Service: Podiatry;  Laterality: Left;    FOOT AMPUTATION  October 2010    left high midfoot amputation       Review of patient's allergies indicates:  No Known Allergies  Current Facility-Administered Medications   Medication Frequency    acetaminophen tablet 650 mg Q6H    apixaban tablet 5 mg BID    ascorbic acid (vitamin C) tablet 500 mg BID    atorvastatin tablet 80 mg QHS    bisacodyL EC tablet 5 mg Daily PRN    cefiderocoL 1 g in dextrose 5 % (D5W) 100 mL infusion Q8H    clopidogreL tablet 75 mg Daily    fluconazole tablet 200 mg Daily    gabapentin capsule 100 mg BID    glucagon (human recombinant) injection 1 mg PRN    glucose chewable tablet 16 g PRN    glucose chewable tablet 24 g PRN    hydrALAZINE tablet 100 mg Q8H    HYDROmorphone injection 0.5 mg Q6H PRN    insulin aspart U-100 pen 0-5 Units QID (AC + HS) PRN    insulin aspart U-100 pen 5 Units TIDWM    insulin detemir U-100 (Levemir) pen 25 Units QHS    miconazole NITRATE 2 % top powder BID    multivitamin tablet Daily    pantoprazole EC tablet 40 mg Daily    sodium chloride 0.9% flush 10 mL PRN    tamsulosin 24 hr capsule 0.4 mg Daily     Family History       Problem Relation (Age of Onset)    Cancer Brother    Diabetes Mother, Sister    Heart disease Mother    Stroke Sister          Tobacco Use    Smoking status: Never    Smokeless tobacco: Never   Substance and Sexual Activity    Alcohol use: No     Comment: occassional    Drug use: No    Sexual  activity: Not Currently     Review of Systems  Objective:     Vital Signs (Most Recent):  Temp: 98 °F (36.7 °C) (10/10/23 0801)  Pulse: (!) 45 (10/10/23 0801)  Resp: 18 (10/10/23 0801)  BP: (!) 160/71 (10/10/23 0801)  SpO2: (!) 93 % (10/10/23 0801) Vital Signs (24h Range):  Temp:  [97.3 °F (36.3 °C)-98.6 °F (37 °C)] 98 °F (36.7 °C)  Pulse:  [38-57] 45  Resp:  [18-20] 18  SpO2:  [91 %-94 %] 93 %  BP: (136-179)/(64-77) 160/71     Weight: 112.5 kg (248 lb) (10/03/23 1006)  Body mass index is 36.62 kg/m².  Body surface area is 2.34 meters squared.    I/O last 3 completed shifts:  In: 700 [P.O.:700]  Out: 1775 [Urine:1775]     Physical Exam  Vitals and nursing note reviewed.   Constitutional:       General: He is not in acute distress.     Appearance: Normal appearance. He is well-developed. He is obese. He is not ill-appearing or diaphoretic.   HENT:      Head: Normocephalic and atraumatic.   Eyes:      Pupils: Pupils are equal, round, and reactive to light.   Cardiovascular:      Rate and Rhythm: Normal rate and regular rhythm.      Heart sounds: Normal heart sounds. No murmur heard.  Pulmonary:      Effort: Pulmonary effort is normal. No respiratory distress.      Breath sounds: Normal breath sounds. No stridor.   Abdominal:      General: Bowel sounds are normal. There is no distension.   Musculoskeletal:         General: No deformity. Normal range of motion.      Cervical back: Normal range of motion and neck supple.      Right lower leg: Edema present.      Left lower leg: Edema present.   Skin:     General: Skin is warm and dry.      Coloration: Skin is not jaundiced or pale.      Findings: Lesion present. No erythema or rash.      Comments: RIJ line in place c/d/I  Picc line removed    Bilateral heel wounds dressed. Refer to media    Neurological:      Mental Status: He is alert and oriented to person, place, and time. Mental status is at baseline.      Motor: No seizure activity.   Psychiatric:         Attention  "and Perception: Attention normal.         Mood and Affect: Mood and affect normal.         Behavior: Behavior normal. Behavior is not aggressive or combative.         Thought Content: Thought content normal.         Judgment: Judgment normal.          Significant Labs:  All labs within the past 24 hours have been reviewed.    Significant Imaging:  Labs: Reviewed    Assessment/Plan:     Cardiac/Vascular  History of complete heart block  Per primary team.     Paroxysmal atrial fibrillation  Per primary team.     Peripheral arterial disease  Per primary team.     Renal/  Acute on chronic renal failure  Non Oliguric YNES on baseline CKD III with sub nephrotic range protienuira (1.15g).   - baselien Scr ~1-1.4; Scr at time of consultation ~4.5  - UA with 2+ protein; Trace blood; 2 RBC.   - Renal US "The right kidney measures 12.1 cm.; The left kidney measures 12.8 cm.; MRD"   - Suspect YNES from ischemic ATN from hypoperfusion in setting of shock (on dobutamine) and symptomatic bradycardia. Additional insult from Sepsis (bacteremia) and Vancomycin toxicity (trough ~27 on 10/4).   Plan:  - Obtain post void residual (patient with partially distended bladder on US).   - Will perform urine micro today  - Obtain cystatin C level   - Repeat vanc trough today  - Avoid drastic changes in BP  - strict ins and outs   - Renally dose all medications  - avoid nephrotoxic agents    Orthopedic  Nonhealing ulcer of right lower leg with fat layer exposed  Per primary team.         Thank you for your consult. I will follow-up with patient. Please contact us if you have any additional questions.     Case discussed with attending. Attestation to follow.       Terrell Mcdonnell DO  Nephrology  [facility] ([place] [address])  "

## 2023-10-10 NOTE — PROGRESS NOTES
[facility] - Intensive Care ([address] Medicine  Progress Note    Patient Name: Saji Castañeda  MRN: [id number]  Patient Class: IP- Inpatient   Admission Date: 10/2/2023  Length of Stay: 8 days  Attending Physician: Milton Shelton MD  Primary Care Provider: [facility] -        Subjective:     Principal Problem:Bacteremia due to Gram-negative bacteria        HPI:  Saji Castañeda is a 73 year old Black man with hypertension, hyperlipidemia, diabetes mellitus type 2 with neuropathy, left ventricular concentric hypertrophy, paroxysmal atrial fibrillation (anticoagulated on apixaban), Mobitz type I atrioventricular heart block, chronic kidney disease stage 3, chronic venous hypertension, peripheral vascular disease, 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 He lives in [place], [place].               He presented to [facility] on 10/2/2023 from [facility] due to concerns of weakness and feeling like he was going to die. He reported no dizziness or headaches. He knew his name, where he was, and the current year but had trouble remembering which nursing home he came from. Blood glucose was in the 20s. He was given dextrose in the emergency department and glucose improved to 96 mg/dL. Labs showed acute kidney injury with creatinine of 2.6 mg/dL from baseline of 1.3 and lactic acid elevated at 2.29 mmol/L. He had complete heart block. He was admitted to Cardiology.      Overview/Hospital Course:  Right internal jugular central venous catheter was placed. He was found to have acute osteomyelitis of the posterior aspect of the calcaneus. He had hypotension concerning for septic shock. He was started on dobutamine, vancomycin, and piperacillin-tazobactam. Blood  culture grew Acinetobacter baumannii and Klebsiella pneumoniae. He had aphasia. Head CT and brain MRI showed no acute stroke. Infectious Disease was consulted as he had been on linezolid, cefepime, and metronidazole for osteomyelitis. PICC line was removed and IV access was replaced in anticipation for long-term IV antibiotics. Podiatry was consulted and recommended X-rays and Wound Care consult. Wound was cultured. He had acute on chronic renal failure. Renal ultrasound showed no obstruction. He was given fluid resuscitation. Heart rate was able to augment appropriately with exertion with transmission of P waves. Repeat EKG showed 2nd degree AV block replacing junctional rhythm. Electrophysiology recommended pacemaker placement. Vascular Surgery suspected poor wound healing to be due to severe edema. They ordered ankle brachial indices but it was unable to be done due to the hardness of his skin. They did not recommend amputation based on their physical exam findings. He was transferred to Hospital Medicine Team D on 10/5/2023. Infectious Disease changed piperacillin-tazobactam to cefidericol and fluconazole. He continued to have asymptomatic bradycardia. Foot wound culture grew Achromobacter xylosoxidans dentrificans and Candida albicans. Lower extremity arterial ultrasound suggested moderate stenosis of the right anterior tibial artery and severe stenosis of the right posterior tibial artery. Vascular Surgery reviewed this and felt it would not hinder his ability to heal. Electrophysiology reviewed his EKG on 10/8/2023 and noted 2nd degree heart block Mobitz I. Renal function did not improve. Fractional excretion of sodium was 1.5% suggesting intrinsic cause. Urinalysis showed pyuria and bacteriuria, similar to 2 other urinalyses done since admission.       Interval History: Urinalysis showed WBCs and bacteria, similar to 2 others done since admission. FE Urea suggests intrinsic YNES. Will consult Nephrology as  renal function has only worsened since admission.     Review of Systems   Constitutional:  Negative for chills and fever.   Neurological:  Negative for seizures and syncope.     Objective:     Vital Signs (Most Recent):  Temp: 98 °F (36.7 °C) (10/10/23 0801)  Pulse: (!) 45 (10/10/23 0801)  Resp: 18 (10/10/23 0801)  BP: (!) 160/71 (10/10/23 0801)  SpO2: (!) 93 % (10/10/23 0801) Vital Signs (24h Range):  Temp:  [97.3 °F (36.3 °C)-98.6 °F (37 °C)] 98 °F (36.7 °C)  Pulse:  [38-57] 45  Resp:  [18-20] 18  SpO2:  [91 %-94 %] 93 %  BP: (136-179)/(64-77) 160/71     Weight: 112.5 kg (248 lb)  Body mass index is 36.62 kg/m².    Intake/Output Summary (Last 24 hours) at 10/10/2023 0811  Last data filed at 10/10/2023 0516  Gross per 24 hour   Intake 350 ml   Output 350 ml   Net 0 ml           Physical Exam  Vitals and nursing note reviewed.   Constitutional:       General: He is not in acute distress.     Appearance: He is well-developed. He is obese. He is not diaphoretic.   Musculoskeletal:      Right lower leg: Edema present.      Left lower leg: Edema present.   Skin:     General: Skin is warm and dry.      Coloration: Skin is not jaundiced or pale.   Neurological:      Mental Status: He is alert and oriented to person, place, and time. Mental status is at baseline.      Motor: No seizure activity.   Psychiatric:         Attention and Perception: Attention normal.         Mood and Affect: Affect normal.         Behavior: Behavior is not aggressive or combative.             Significant Labs: All pertinent labs within the past 24 hours have been reviewed.  Recent Labs   Lab 10/04/23  0224 10/05/23  0234 10/07/23  0959 10/08/23  0543 10/09/23  0629    136 138 139 138   K 3.9 4.4 4.6 5.0 5.0    105 106 108 108   CO2 25 24 22* 21* 20*   BUN 46* 44* 51* 51* 51*   CREATININE 3.5* 3.7* 4.4* 4.1* 4.6*   CALCIUM 8.5* 8.7 8.5* 8.5* 8.5*   PROT 6.2 6.2  --   --   --    BILITOT 0.3 0.3  --   --   --    ALKPHOS 42* 46*  --   --    --    ALT 11 10  --   --   --    AST 14 17  --   --   --          Significant Imaging: I have reviewed all pertinent imaging results/findings within the past 24 hours.        Assessment/Plan:      * Bacteremia due to Gram-negative bacteria  On cefidericol and fluconazole. Appreciate Infectious Disease.    Bacterial UTI  Urine culture on 10/2/2023 had no growth. Urine culture on 10/4/2023 grew multiple organisms with none in predominance. Follow up urine culture on 10/9/2023 as pyuria and bacteriuria persist. On antibiotic for bacteremia.    Blurred vision, left eye  Reports this to be chronic. Follow up with an ophthalmologist.    History of complete heart block  AV block, 2nd degree  Electrophysiology recommends pacemaker.    Paroxysmal atrial fibrillation  Continue home apixaban.     AV block, 2nd degree  Seen by Electrophysiology, who recommend pacemaker placement.    Nonhealing ulcer of right lower leg with fat layer exposed  Edema of leg  Vascular Surgery believes edema contributes to poor wound healing. They did not recommend amputation. they recommended compression wrappings. Consult Wound Care for this. Arterial ultrasound suggested severe stenosis of right posterior tibial artery and moderate stenosis of right anterior tibial artery. Already on medical management for PAD with clopidrogel, apixaban, atorvastatin.     Chronic deep vein thrombosis (DVT) of right upper extremity  Continue home apixaban.    Peripheral arterial disease  Continue home clopidrogel and atorvastatin. Evaluated by Interventional Cardiology in May and deemed not to be a revascularization candidate due to extensive disease, multiple comorbidities, debility/bed bound status and concern for noncompliance.    Acute on chronic renal failure  On admission, has worsened since. Monitor labs. FENa 1.5%. Occasional eosinophils. No obstruction on renal ultrasound. Consult Nephrology for evaluation.     Insulin dependent type 2 diabetes  mellitus  Holding home glipizide. He takes insulin glargine 45 units HS and insulin aspart 12 units TID. Giving insulin detemir 20 units HS, insulin aspart 5 units TID and sliding scale. Increase insulin detemir to 25 units HS. Monitor Accuchecks.      VTE Risk Mitigation (From admission, onward)         Ordered     apixaban tablet 5 mg  2 times daily         10/02/23 1544     IP VTE HIGH RISK PATIENT  Once         10/02/23 1513     Place sequential compression device  Until discontinued         10/02/23 1513                Discharge Planning   OXANA: 10/12/2023     Code Status: Full Code   Is the patient medically ready for discharge?: No    Reason for patient still in hospital (select all that apply): Patient unstable, Patient trending condition, Treatment and Consult recommendations  Discharge Plan A: Home Health                  Milton Shelton MD  Department of Hospital Medicine   [facility] - Intensive Care ([place])

## 2023-10-10 NOTE — PROGRESS NOTES
[facility] - Intensive Care ([place] [address])  Infectious Disease  Progress Note    Patient Name: Saji Castañeda  MRN: [id number]  Admission Date: 10/2/2023  Length of Stay: 8 days  Attending Physician: Milton Shelton MD  Primary Care Provider: [facility] -    Isolation Status: Contact  Assessment/Plan:      ID  * Bacteremia due to Gram-negative bacteria  73-year-old male with history of afib, T2DM, PVD s/p left foot amputation and chronic OM on IV abx (linezolid, cefepime, metronidzole) at NH presented with weakness found to have sepsis secondary to ESBL Klebsiella and  Acinetobacter bacteremia. likely source includes PICC line - also consider from left heel wound. PICC line removed 10/4/2023. Repeat blood cultures NGTD.     Cefidericol was added to blood culture report from 10/2, awaiting results of sensitivity report. Contacted micro lab today and stated unsure timeline of results. Wound culture + C. Albicans and achromobacter xylosoxidans.         Recommendations:  1. Continue cefidericol 1gq8hr and fluconazole   2. Will follow for cefidericol sensitivity report. Pending from micro lab.   3. No further vascular surgical plans at this time. Stating pt has ability to heal wounds. Recommend to continue frequent wound care, off loading, and nutritional support.   4. Plan reviewed with ID staff. ID will follow.         Thank you for your consult. I will follow-up with patient. Please contact us if you have any additional questions.    Dolly Gastelum NP  Infectious Disease  [facility] - Intensive Care ([place] [address])    Subjective:     Principal Problem:Bacteremia due to Gram-negative bacteria    HPI: 73-year-old male with history of afib, T2DM, PVD s/p left foot amputation, chronic osteomyelitis, presented from nursing home with weakness. Patient found to have mobitz type 1 AV block, admitted to CCU. Patient started on dobutamine IV. ID consulted for management of osteomyelitis of left foot. He was being  treated with linezolid, cefepime, metronidazole at his nursing home. He had a left PICC line. Patient found to have Acinetobacter baumanii bacteremia. Patient reported feeling better compared to admission, denied having any pain.    Interval History:   NAEON  Blood cultures +ESBL Klebsiella and  Acinetobacter, repeats on 10/6 NGTD.   Wound cultures + C. Albicans and achromobacter xylosoxidans  Pt remains stable, afebrile. No acute complaints or concerns       Review of Systems   Constitutional:  Negative for chills, diaphoresis, fatigue and fever.   HENT: Negative.     Eyes: Negative.    Respiratory:  Negative for cough and shortness of breath.    Cardiovascular:  Negative for chest pain, palpitations and leg swelling.   Gastrointestinal:  Negative for abdominal pain, diarrhea, nausea and vomiting.   Musculoskeletal:  Negative for arthralgias, back pain and neck pain.   Skin:  Positive for wound. Negative for rash.   Neurological:  Negative for weakness, numbness and headaches.   Psychiatric/Behavioral:  Negative for agitation.      Objective:     Vital Signs (Most Recent):  Temp: 98.3 °F (36.8 °C) (10/10/23 1132)  Pulse: (!) 37 (10/10/23 1458)  Resp: 18 (10/10/23 1132)  BP: (!) 156/67 (10/10/23 1132)  SpO2: 95 % (10/10/23 1132) Vital Signs (24h Range):  Temp:  [97.3 °F (36.3 °C)-98.6 °F (37 °C)] 98.3 °F (36.8 °C)  Pulse:  [37-57] 37  Resp:  [18-20] 18  SpO2:  [93 %-95 %] 95 %  BP: (136-179)/(64-77) 156/67     Weight: 112.5 kg (248 lb)  Body mass index is 36.62 kg/m².    Estimated Creatinine Clearance: 17.8 mL/min (A) (based on SCr of 4.5 mg/dL (H)).     Physical Exam  Vitals and nursing note reviewed.   Constitutional:       General: He is not in acute distress.     Appearance: Normal appearance. He is well-developed. He is not ill-appearing or diaphoretic.   HENT:      Head: Normocephalic and atraumatic.   Eyes:      Pupils: Pupils are equal, round, and reactive to light.   Cardiovascular:      Rate and Rhythm:  Normal rate and regular rhythm.      Heart sounds: Normal heart sounds. No murmur heard.  Pulmonary:      Effort: Pulmonary effort is normal. No respiratory distress.      Breath sounds: Normal breath sounds. No stridor.   Abdominal:      General: Bowel sounds are normal. There is no distension.   Musculoskeletal:         General: No deformity. Normal range of motion.      Cervical back: Normal range of motion and neck supple.   Skin:     General: Skin is warm and dry.      Findings: Lesion present. No erythema or rash.      Comments: RIJ line in place c/d/I  Picc line removed    Bilateral heel wounds dressed. Refer to media    Neurological:      Mental Status: He is alert and oriented to person, place, and time.   Psychiatric:         Mood and Affect: Mood normal.         Behavior: Behavior normal.         Thought Content: Thought content normal.         Judgment: Judgment normal.          Significant Labs: All pertinent labs within the past 24 hours have been reviewed.    Significant Imaging: I have reviewed all pertinent imaging results/findings within the past 24 hours.

## 2023-10-10 NOTE — ASSESSMENT & PLAN NOTE
Holding home glipizide. He takes insulin glargine 45 units HS and insulin aspart 12 units TID. Giving insulin detemir 20 units HS, insulin aspart 5 units TID and sliding scale. Increase insulin detemir to 25 units HS. Monitor Accuchecks.

## 2023-10-10 NOTE — ASSESSMENT & PLAN NOTE
73-year-old male with history of afib, T2DM, PVD s/p left foot amputation and chronic OM on IV abx (linezolid, cefepime, metronidzole) at NH presented with weakness found to have sepsis secondary to ESBL Klebsiella and  Acinetobacter bacteremia. likely source includes PICC line - also consider from left heel wound. PICC line removed 10/4/2023. Repeat blood cultures NGTD.     Cefidericol was added to blood culture report from 10/2, awaiting results of sensitivity report. Contacted micro lab today and stated unsure timeline of results. Wound culture + C. Albicans and achromobacter xylosoxidans.         Recommendations:  1. Continue cefidericol 1gq8hr and fluconazole   2. Will follow for cefidericol sensitivity report. Pending from micro lab.   3. No further vascular surgical plans at this time. Stating pt has ability to heal wounds. Recommend to continue frequent wound care, off loading, and nutritional support.   4. Plan reviewed with ID staff. ID will follow.

## 2023-10-10 NOTE — ASSESSMENT & PLAN NOTE
"Non Oliguric YNES on baseline CKD III with sub nephrotic range protienuira (1.15g).   - baselien Scr ~1-1.4; Scr at time of consultation ~4.5  - UA with 2+ protein; Trace blood; 2 RBC.   - Renal US "The right kidney measures 12.1 cm.; The left kidney measures 12.8 cm.; MRD"   - Suspect YNES from ischemic ATN from hypoperfusion in setting of shock (on dobutamine) and symptomatic bradycardia. Additional insult from Sepsis (bacteremia) and Vancomycin toxicity (trough ~27 on 10/4).   Plan:  - Obtain post void residual (patient with partially distended bladder on US).   - Will perform urine micro today  - Obtain cystatin C level   - Repeat vanc trough today  - Avoid drastic changes in BP  - strict ins and outs   - Renally dose all medications  - avoid nephrotoxic agents  "

## 2023-10-10 NOTE — SUBJECTIVE & OBJECTIVE
Past Medical History:   Diagnosis Date    Arthritis     legs    Diabetes mellitus     Diabetes mellitus, type 2     Hyperlipidemia     Hypertension     Osteomyelitis     Palliative care encounter 5/24/2023       Past Surgical History:   Procedure Laterality Date    ANGIOGRAPHY OF LOWER EXTREMITY N/A 2/3/2021    Procedure: Angiogram Extremity Bilateral;  Surgeon: Ernst Chacko MD;  Location: [facility] OR [address] [place] [address];  Service: Peripheral Vascular;  Laterality: N/A;  7.4 mintues fluoroscopy time  816.15 mGy  170.17 Gycm2    AORTOGRAPHY WITH EXTREMITY RUNOFF Bilateral 2/3/2021    Procedure: AORTOGRAM, WITH EXTREMITY RUNOFF;  Surgeon: Ernst Chacko MD;  [facility];  Service: Peripheral Vascular;  Laterality: Bilateral;    DEBRIDEMENT OF FOOT Left 2/23/2021    Procedure: DEBRIDEMENT, LEFT HEEL;  Surgeon: Mayra Schroeder DPM;  Location: [facility] OR [address] [place] [address];  Service: Podiatry;  Laterality: Left;    FOOT AMPUTATION  October 2010    left high midfoot amputation       Review of patient's allergies indicates:  No Known Allergies  Current Facility-Administered Medications   Medication Frequency    acetaminophen tablet 650 mg Q6H    apixaban tablet 5 mg BID    ascorbic acid (vitamin C) tablet 500 mg BID    atorvastatin tablet 80 mg QHS    bisacodyL EC tablet 5 mg Daily PRN    cefiderocoL 1 g in dextrose 5 % (D5W) 100 mL infusion Q8H    clopidogreL tablet 75 mg Daily    fluconazole tablet 200 mg Daily    gabapentin capsule 100 mg BID    glucagon (human recombinant) injection 1 mg PRN    glucose chewable tablet 16 g PRN    glucose chewable tablet 24 g PRN    hydrALAZINE tablet 100 mg Q8H    HYDROmorphone injection 0.5 mg Q6H PRN    insulin aspart U-100 pen 0-5 Units QID (AC + HS) PRN    insulin aspart U-100 pen 5 Units TIDWM    insulin detemir U-100 (Levemir) pen 25 Units QHS    miconazole NITRATE 2 % top powder BID    multivitamin tablet Daily    pantoprazole EC tablet 40 mg Daily    sodium chloride 0.9%  flush 10 mL PRN    tamsulosin 24 hr capsule 0.4 mg Daily     Family History       Problem Relation (Age of Onset)    Cancer Brother    Diabetes Mother, Sister    Heart disease Mother    Stroke Sister          Tobacco Use    Smoking status: Never    Smokeless tobacco: Never   Substance and Sexual Activity    Alcohol use: No     Comment: occassional    Drug use: No    Sexual activity: Not Currently     Review of Systems  Objective:     Vital Signs (Most Recent):  Temp: 98 °F (36.7 °C) (10/10/23 0801)  Pulse: (!) 45 (10/10/23 0801)  Resp: 18 (10/10/23 0801)  BP: (!) 160/71 (10/10/23 0801)  SpO2: (!) 93 % (10/10/23 0801) Vital Signs (24h Range):  Temp:  [97.3 °F (36.3 °C)-98.6 °F (37 °C)] 98 °F (36.7 °C)  Pulse:  [38-57] 45  Resp:  [18-20] 18  SpO2:  [91 %-94 %] 93 %  BP: (136-179)/(64-77) 160/71     Weight: 112.5 kg (248 lb) (10/03/23 1006)  Body mass index is 36.62 kg/m².  Body surface area is 2.34 meters squared.    I/O last 3 completed shifts:  In: 700 [P.O.:700]  Out: 1775 [Urine:1775]     Physical Exam  Vitals and nursing note reviewed.   Constitutional:       General: He is not in acute distress.     Appearance: Normal appearance. He is well-developed. He is obese. He is not ill-appearing or diaphoretic.   HENT:      Head: Normocephalic and atraumatic.   Eyes:      Pupils: Pupils are equal, round, and reactive to light.   Cardiovascular:      Rate and Rhythm: Normal rate and regular rhythm.      Heart sounds: Normal heart sounds. No murmur heard.  Pulmonary:      Effort: Pulmonary effort is normal. No respiratory distress.      Breath sounds: Normal breath sounds. No stridor.   Abdominal:      General: Bowel sounds are normal. There is no distension.   Musculoskeletal:         General: No deformity. Normal range of motion.      Cervical back: Normal range of motion and neck supple.      Right lower leg: Edema present.      Left lower leg: Edema present.   Skin:     General: Skin is warm and dry.      Coloration:  Skin is not jaundiced or pale.      Findings: Lesion present. No erythema or rash.      Comments: RIJ line in place c/d/I  Picc line removed    Bilateral heel wounds dressed. Refer to media    Neurological:      Mental Status: He is alert and oriented to person, place, and time. Mental status is at baseline.      Motor: No seizure activity.   Psychiatric:         Attention and Perception: Attention normal.         Mood and Affect: Mood and affect normal.         Behavior: Behavior normal. Behavior is not aggressive or combative.         Thought Content: Thought content normal.         Judgment: Judgment normal.          Significant Labs:  All labs within the past 24 hours have been reviewed.    Significant Imaging:  Labs: Reviewed

## 2023-10-10 NOTE — HPI
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 Patient presnted to [facility] with complaints of fatigue. He was found to be in complete heart block and hypotensive, Additionally noted to have acute osteomyelitis of the posterior aspect of the calcaneus. He had hypotension concerning for septic shock. He was started on dobutamine, vancomycin, and piperacillin-tazobactam. Blood culture grew Acinetobacter baumannii and Klebsiella pneumoniae. Hosptial course complicated by YNES, for which nephrology was consulted for.

## 2023-10-10 NOTE — PT/OT/SLP PROGRESS
"Speech Language Pathology Treatment    Patient Name:  Saji Castañeda   MRN:  [id number]  Admitting Diagnosis: Bacteremia due to Gram-negative bacteria    Recommendations:                 General Recommendations:  Cognitive-linguistic therapy  Diet recommendations:  Soft & Bite Sized Diet - IDDSI Level 6, Liquid Diet Level: Thin liquids - IDDSI Level 0   Aspiration Precautions: Standard aspiration precautions   General Precautions: Standard, fall  Communication strategies:  none    Assessment:     Saji Castañeda is a 73 y.o. male with an SLP diagnosis of Cognitive-Linguistic Impairment.  He presents with good participation in session.    Subjective     "I didn't sleep well"  Patient goals: get better     Pain/Comfort:  Pain Rating 1: 0/10  Pain Rating Post-Intervention 1: 0/10    Respiratory Status: Room air    Objective:     Has the patient been evaluated by SLP for swallowing?   No  Keep patient NPO? No   Current Respiratory Status:        Pt seen bedside with no family present. Pt ox3. He reported he did not feel 100% back to baseline with his cognition. He also reported improved with the blurry sensation in left eye but stated he came and went. He was able to read a few large printed items on white board but not able to read regular print from a handout. He denied wearing glasses. He was able to name only 4 items in a category given mod a and increased time. Pt also needed repetition of category as he often forgot it. He was able to contrast items when 100% acc but needed cues to compare them. Pt with good  recall re: his general medical condition. Education provided re: cognition, role of slp and poc. Pt expressed good understanding.     Goals:   Multidisciplinary Problems       SLP Goals          Problem: SLP    Goal Priority Disciplines Outcome   SLP Goal     SLP    Description: Speech Language Pathology Goals  Goals expected to be met by 10/12    1. Pt will recall 3 novel items after 2 minute delay given mod " cues.  2. Pt will complete immediate memory tasks with 70% accuracy given min cues.  3. Pt will follow 3-step commands with 70% accuracy given mod cues.  4. Pt will list 7 items from a category in 1 minute given min cues.   5. Pt will participate in assessment for reading, writing, and visuospatial abilities.                               Plan:     Patient to be seen:  3 x/week   Plan of Care expires:  11/02/23  Plan of Care reviewed with:  patient   SLP Follow-Up:  Yes       Discharge recommendations:  nursing facility, skilled   Barriers to Discharge:  None    Time Tracking:     SLP Treatment Date:   10/10/23  Speech Start Time:  1000  Speech Stop Time:  1015     Speech Total Time (min):  15 min    Billable Minutes: Speech Therapy Individual 7 and Self Care/Home Management Training 8    10/10/2023

## 2023-10-10 NOTE — SUBJECTIVE & OBJECTIVE
Interval History:   NAEON  Blood cultures +ESBL Klebsiella and  Acinetobacter, repeats on 10/6 NGTD.   Wound cultures + C. Albicans and achromobacter xylosoxidans  Pt remains stable, afebrile. No acute complaints or concerns       Review of Systems   Constitutional:  Negative for chills, diaphoresis, fatigue and fever.   HENT: Negative.     Eyes: Negative.    Respiratory:  Negative for cough and shortness of breath.    Cardiovascular:  Negative for chest pain, palpitations and leg swelling.   Gastrointestinal:  Negative for abdominal pain, diarrhea, nausea and vomiting.   Musculoskeletal:  Negative for arthralgias, back pain and neck pain.   Skin:  Positive for wound. Negative for rash.   Neurological:  Negative for weakness, numbness and headaches.   Psychiatric/Behavioral:  Negative for agitation.      Objective:     Vital Signs (Most Recent):  Temp: 98.3 °F (36.8 °C) (10/10/23 1132)  Pulse: (!) 37 (10/10/23 1458)  Resp: 18 (10/10/23 1132)  BP: (!) 156/67 (10/10/23 1132)  SpO2: 95 % (10/10/23 1132) Vital Signs (24h Range):  Temp:  [97.3 °F (36.3 °C)-98.6 °F (37 °C)] 98.3 °F (36.8 °C)  Pulse:  [37-57] 37  Resp:  [18-20] 18  SpO2:  [93 %-95 %] 95 %  BP: (136-179)/(64-77) 156/67     Weight: 112.5 kg (248 lb)  Body mass index is 36.62 kg/m².    Estimated Creatinine Clearance: 17.8 mL/min (A) (based on SCr of 4.5 mg/dL (H)).     Physical Exam  Vitals and nursing note reviewed.   Constitutional:       General: He is not in acute distress.     Appearance: Normal appearance. He is well-developed. He is not ill-appearing or diaphoretic.   HENT:      Head: Normocephalic and atraumatic.   Eyes:      Pupils: Pupils are equal, round, and reactive to light.   Cardiovascular:      Rate and Rhythm: Normal rate and regular rhythm.      Heart sounds: Normal heart sounds. No murmur heard.  Pulmonary:      Effort: Pulmonary effort is normal. No respiratory distress.      Breath sounds: Normal breath sounds. No stridor.    Abdominal:      General: Bowel sounds are normal. There is no distension.   Musculoskeletal:         General: No deformity. Normal range of motion.      Cervical back: Normal range of motion and neck supple.   Skin:     General: Skin is warm and dry.      Findings: Lesion present. No erythema or rash.      Comments: RIJ line in place c/d/I  Picc line removed    Bilateral heel wounds dressed. Refer to media    Neurological:      Mental Status: He is alert and oriented to person, place, and time.   Psychiatric:         Mood and Affect: Mood normal.         Behavior: Behavior normal.         Thought Content: Thought content normal.         Judgment: Judgment normal.          Significant Labs: All pertinent labs within the past 24 hours have been reviewed.    Significant Imaging: I have reviewed all pertinent imaging results/findings within the past 24 hours.

## 2023-10-10 NOTE — ASSESSMENT & PLAN NOTE
Urine culture on 10/2/2023 had no growth. Urine culture on 10/4/2023 grew multiple organisms with none in predominance. Follow up urine culture on 10/9/2023 as pyuria and bacteriuria persist. On antibiotic for bacteremia.

## 2023-10-11 ENCOUNTER — ANESTHESIA EVENT (OUTPATIENT)
Dept: MEDSURG UNIT | Facility: HOSPITAL | Age: 73
DRG: 853 | End: 2023-10-11
Payer: MEDICARE

## 2023-10-11 PROBLEM — I44.2 COMPLETE HEART BLOCK: Status: ACTIVE | Noted: 2023-10-11

## 2023-10-11 PROBLEM — R57.0 CARDIOGENIC SHOCK: Status: ACTIVE | Noted: 2023-10-11

## 2023-10-11 PROBLEM — R82.71 ASYMPTOMATIC BACTERIURIA: Status: ACTIVE | Noted: 2023-10-10

## 2023-10-11 LAB
ALBUMIN SERPL BCP-MCNC: 2.2 G/DL (ref 3.5–5.2)
ANION GAP SERPL CALC-SCNC: 10 MMOL/L (ref 8–16)
ARUP MISCELLANEOUS TEST 1: NORMAL
BACTERIA BLD CULT: NORMAL
BACTERIA BLD CULT: NORMAL
BACTERIA UR CULT: NO GROWTH
BUN SERPL-MCNC: 57 MG/DL (ref 8–23)
CALCIUM SERPL-MCNC: 8.4 MG/DL (ref 8.7–10.5)
CHLORIDE SERPL-SCNC: 107 MMOL/L (ref 95–110)
CO2 SERPL-SCNC: 21 MMOL/L (ref 23–29)
CREAT SERPL-MCNC: 4.3 MG/DL (ref 0.5–1.4)
EST. GFR  (NO RACE VARIABLE): 13.7 ML/MIN/1.73 M^2
GLUCOSE SERPL-MCNC: 107 MG/DL (ref 70–110)
INR PPP: 1.1 (ref 0.8–1.2)
PHOSPHATE SERPL-MCNC: 3.7 MG/DL (ref 2.7–4.5)
POCT GLUCOSE: 101 MG/DL (ref 70–110)
POCT GLUCOSE: 148 MG/DL (ref 70–110)
POCT GLUCOSE: 161 MG/DL (ref 70–110)
POTASSIUM SERPL-SCNC: 5.2 MMOL/L (ref 3.5–5.1)
PROTHROMBIN TIME: 12.1 SEC (ref 9–12.5)
SODIUM SERPL-SCNC: 138 MMOL/L (ref 136–145)

## 2023-10-11 PROCEDURE — 36415 COLL VENOUS BLD VENIPUNCTURE: CPT | Performed by: HOSPITALIST

## 2023-10-11 PROCEDURE — 21400001 HC TELEMETRY ROOM

## 2023-10-11 PROCEDURE — 99233 PR SUBSEQUENT HOSPITAL CARE,LEVL III: ICD-10-PCS | Mod: ,,, | Performed by: REGISTERED NURSE

## 2023-10-11 PROCEDURE — 63600175 PHARM REV CODE 636 W HCPCS: Performed by: HOSPITALIST

## 2023-10-11 PROCEDURE — 63600175 PHARM REV CODE 636 W HCPCS

## 2023-10-11 PROCEDURE — 99223 PR INITIAL HOSPITAL CARE,LEVL III: ICD-10-PCS | Mod: GC,,, | Performed by: STUDENT IN AN ORGANIZED HEALTH CARE EDUCATION/TRAINING PROGRAM

## 2023-10-11 PROCEDURE — 63600175 PHARM REV CODE 636 W HCPCS: Mod: JZ,TB | Performed by: PHYSICIAN ASSISTANT

## 2023-10-11 PROCEDURE — 25000003 PHARM REV CODE 250

## 2023-10-11 PROCEDURE — 36415 COLL VENOUS BLD VENIPUNCTURE: CPT | Performed by: STUDENT IN AN ORGANIZED HEALTH CARE EDUCATION/TRAINING PROGRAM

## 2023-10-11 PROCEDURE — 99233 SBSQ HOSP IP/OBS HIGH 50: CPT | Mod: ,,, | Performed by: INTERNAL MEDICINE

## 2023-10-11 PROCEDURE — 27000207 HC ISOLATION

## 2023-10-11 PROCEDURE — 97530 THERAPEUTIC ACTIVITIES: CPT | Mod: CO

## 2023-10-11 PROCEDURE — 85610 PROTHROMBIN TIME: CPT | Performed by: STUDENT IN AN ORGANIZED HEALTH CARE EDUCATION/TRAINING PROGRAM

## 2023-10-11 PROCEDURE — 97530 THERAPEUTIC ACTIVITIES: CPT

## 2023-10-11 PROCEDURE — 97535 SELF CARE MNGMENT TRAINING: CPT | Mod: CO

## 2023-10-11 PROCEDURE — 99233 SBSQ HOSP IP/OBS HIGH 50: CPT | Mod: ,,, | Performed by: HOSPITALIST

## 2023-10-11 PROCEDURE — 25000003 PHARM REV CODE 250: Performed by: PHYSICIAN ASSISTANT

## 2023-10-11 PROCEDURE — 25000003 PHARM REV CODE 250: Performed by: HOSPITALIST

## 2023-10-11 PROCEDURE — 99233 PR SUBSEQUENT HOSPITAL CARE,LEVL III: ICD-10-PCS | Mod: ,,, | Performed by: HOSPITALIST

## 2023-10-11 PROCEDURE — 99223 1ST HOSP IP/OBS HIGH 75: CPT | Mod: GC,,, | Performed by: STUDENT IN AN ORGANIZED HEALTH CARE EDUCATION/TRAINING PROGRAM

## 2023-10-11 PROCEDURE — 99233 PR SUBSEQUENT HOSPITAL CARE,LEVL III: ICD-10-PCS | Mod: ,,, | Performed by: INTERNAL MEDICINE

## 2023-10-11 PROCEDURE — 80069 RENAL FUNCTION PANEL: CPT | Performed by: HOSPITALIST

## 2023-10-11 PROCEDURE — 99233 SBSQ HOSP IP/OBS HIGH 50: CPT | Mod: ,,, | Performed by: REGISTERED NURSE

## 2023-10-11 PROCEDURE — 25000003 PHARM REV CODE 250: Performed by: INTERNAL MEDICINE

## 2023-10-11 RX ORDER — CLOPIDOGREL BISULFATE 75 MG/1
75 TABLET ORAL DAILY
Status: DISCONTINUED | OUTPATIENT
Start: 2023-10-13 | End: 2023-10-19 | Stop reason: HOSPADM

## 2023-10-11 RX ORDER — AMMONIUM LACTATE 12 G/100G
LOTION TOPICAL 2 TIMES DAILY PRN
Status: DISCONTINUED | OUTPATIENT
Start: 2023-10-11 | End: 2023-10-19 | Stop reason: HOSPADM

## 2023-10-11 RX ADMIN — INSULIN ASPART 5 UNITS: 100 INJECTION, SOLUTION INTRAVENOUS; SUBCUTANEOUS at 12:10

## 2023-10-11 RX ADMIN — THERA TABS 1 TABLET: TAB at 10:10

## 2023-10-11 RX ADMIN — CEFIDEROCOL SULFATE TOSYLATE 1 G: 1 INJECTION, POWDER, FOR SOLUTION INTRAVENOUS at 02:10

## 2023-10-11 RX ADMIN — HYDRALAZINE HYDROCHLORIDE 100 MG: 50 TABLET ORAL at 06:10

## 2023-10-11 RX ADMIN — OXYCODONE HYDROCHLORIDE AND ACETAMINOPHEN 500 MG: 500 TABLET ORAL at 10:10

## 2023-10-11 RX ADMIN — MICONAZOLE NITRATE: 20 POWDER TOPICAL at 08:10

## 2023-10-11 RX ADMIN — HYDRALAZINE HYDROCHLORIDE 100 MG: 50 TABLET ORAL at 02:10

## 2023-10-11 RX ADMIN — MICONAZOLE NITRATE: 20 POWDER TOPICAL at 10:10

## 2023-10-11 RX ADMIN — CEFIDEROCOL SULFATE TOSYLATE 1 G: 1 INJECTION, POWDER, FOR SOLUTION INTRAVENOUS at 06:10

## 2023-10-11 RX ADMIN — INSULIN ASPART 1 UNITS: 100 INJECTION, SOLUTION INTRAVENOUS; SUBCUTANEOUS at 08:10

## 2023-10-11 RX ADMIN — ACETAMINOPHEN 650 MG: 325 TABLET ORAL at 06:10

## 2023-10-11 RX ADMIN — ACETAMINOPHEN 650 MG: 325 TABLET ORAL at 01:10

## 2023-10-11 RX ADMIN — ACETAMINOPHEN 650 MG: 325 TABLET ORAL at 12:10

## 2023-10-11 RX ADMIN — ACETAMINOPHEN 650 MG: 325 TABLET ORAL at 05:10

## 2023-10-11 RX ADMIN — TAMSULOSIN HYDROCHLORIDE 0.4 MG: 0.4 CAPSULE ORAL at 10:10

## 2023-10-11 RX ADMIN — INSULIN ASPART 5 UNITS: 100 INJECTION, SOLUTION INTRAVENOUS; SUBCUTANEOUS at 05:10

## 2023-10-11 RX ADMIN — HYDRALAZINE HYDROCHLORIDE 100 MG: 50 TABLET ORAL at 08:10

## 2023-10-11 RX ADMIN — OXYCODONE HYDROCHLORIDE AND ACETAMINOPHEN 500 MG: 500 TABLET ORAL at 08:10

## 2023-10-11 RX ADMIN — GABAPENTIN 100 MG: 300 CAPSULE ORAL at 08:10

## 2023-10-11 RX ADMIN — CEFIDEROCOL SULFATE TOSYLATE 1 G: 1 INJECTION, POWDER, FOR SOLUTION INTRAVENOUS at 11:10

## 2023-10-11 RX ADMIN — ATORVASTATIN CALCIUM 80 MG: 40 TABLET, FILM COATED ORAL at 08:10

## 2023-10-11 RX ADMIN — GABAPENTIN 100 MG: 300 CAPSULE ORAL at 10:10

## 2023-10-11 RX ADMIN — FLUCONAZOLE 200 MG: 200 TABLET ORAL at 10:10

## 2023-10-11 RX ADMIN — PANTOPRAZOLE SODIUM 40 MG: 40 TABLET, DELAYED RELEASE ORAL at 10:10

## 2023-10-11 NOTE — ASSESSMENT & PLAN NOTE
Holding home glipizide. He takes insulin glargine 45 units HS and insulin aspart 12 units TID. Giving insulin detemir 25 units HS, insulin aspart 5 units TID and sliding scale. Monitor Accuchecks.

## 2023-10-11 NOTE — PROGRESS NOTES
[facility] ([place] [id number])  Infectious Disease  Progress Note    Patient Name: Saji Castañeda  MRN: [id number]  Admission Date: 10/2/2023  Length of Stay: 9 days  Attending Physician: Milton Shelton MD  Primary Care Provider: [facility] -    Isolation Status: Contact  Assessment/Plan:      ID  * Bacteremia due to Gram-negative bacteria  73-year-old male with history of afib, T2DM, PVD s/p left foot amputation and chronic OM on IV abx (linezolid, cefepime, metronidzole) at NH presented with weakness found to have sepsis secondary to ESBL Klebsiella and  Acinetobacter bacteremia. likely source includes PICC line - also consider from left heel wound. PICC line removed 10/4/2023. Repeat blood cultures NGTD.     Cefidericol was added to blood culture report from 10/2, awaiting results of sensitivity report. Contacted micro lab today and stated sensitivy report would return in 7d. Wound culture + C. Albicans and achromobacter xylosoxidans.     To note, pt has followed with wound care who recommended ED visit in early September which pt declined. Ended up presenting to  ED on 9/13 with cf sepsis 2/2 bilateral BLE wounds. Pt not amendable for amputation. Was discharged with 6 weeks of linezolid, cefepime, and flagyl, EOC ~10/25/23.     Pt being followed by EP cardiology for cardiac arrhthymias, planning for placement of leadless pacemaker tomorrow.        Recommendations:  Continue cefidericol 1g IV q8hr (CrCl 18- renally dosed) and fluconazole 200mg qd  Will follow for cefidericol sensitivity report from outpatient. Pending from micro lab.   No further vascular surgical plans at this time. Stating pt has ability to heal wounds. Recommend to continue frequent wound care, off loading, and nutritional support.   Blood cultures cleared on 10/6, no contraindications of pacemaker placement at this time.   Plan reviewed with ID staff. ID will follow.               Thank you for your consult. I  will follow-up with patient. Please contact us if you have any additional questions.    Dolly Gastelum NP  Infectious Disease  [facility] - Intensive Care ([facility]-)    Subjective:     Principal Problem:Bacteremia due to Gram-negative bacteria    HPI: 73-year-old male with history of afib, T2DM, PVD s/p left foot amputation, chronic osteomyelitis, presented from nursing home with weakness. Patient found to have mobitz type 1 AV block, admitted to CCU. Patient started on dobutamine IV. ID consulted for management of osteomyelitis of left foot. He was being treated with linezolid, cefepime, metronidazole at his nursing home. He had a left PICC line. Patient found to have Acinetobacter baumanii bacteremia. Patient reported feeling better compared to admission, denied having any pain.    Interval History:     Blood cultures +ESBL Klebsiella and  Acinetobacter, repeats on 10/6 NGTD.   Wound cultures + C. Albicans and achromobacter xylosoxidans  Pt remains stable, afebrile. No acute complaints or concerns       Review of Systems   Constitutional:  Negative for chills, diaphoresis, fatigue and fever.   HENT: Negative.     Eyes: Negative.    Respiratory:  Negative for cough and shortness of breath.    Cardiovascular:  Negative for chest pain, palpitations and leg swelling.   Gastrointestinal:  Negative for abdominal pain, diarrhea, nausea and vomiting.   Musculoskeletal:  Negative for arthralgias, back pain and neck pain.   Skin:  Positive for wound. Negative for rash.   Neurological:  Negative for weakness, numbness and headaches.   Psychiatric/Behavioral:  Negative for agitation.      Objective:     Vital Signs (Most Recent):  Temp: 98 °F (36.7 °C) (10/11/23 1141)  Pulse: (!) 45 (10/11/23 1141)  Resp: 19 (10/11/23 1141)  BP: (!) 159/70 (10/11/23 1141)  SpO2: (!) 94 % (10/11/23 1141) Vital Signs (24h Range):  Temp:  [97.8 °F (36.6 °C)-98.6 °F (37 °C)] 98 °F (36.7 °C)  Pulse:  [34-50] 45  Resp:  [17-20] 19  SpO2:   [92 %-96 %] 94 %  BP: (124-176)/(56-72) 159/70     Weight: 112.5 kg (248 lb)  Body mass index is 36.62 kg/m².    Estimated Creatinine Clearance: 18.6 mL/min (A) (based on SCr of 4.3 mg/dL (H)).     Physical Exam  Vitals and nursing note reviewed.   Constitutional:       General: He is not in acute distress.     Appearance: Normal appearance. He is well-developed. He is not ill-appearing or diaphoretic.   HENT:      Head: Normocephalic and atraumatic.   Eyes:      Pupils: Pupils are equal, round, and reactive to light.   Cardiovascular:      Rate and Rhythm: Normal rate and regular rhythm.      Heart sounds: Normal heart sounds. No murmur heard.  Pulmonary:      Effort: Pulmonary effort is normal. No respiratory distress.      Breath sounds: Normal breath sounds. No stridor.   Abdominal:      General: Bowel sounds are normal. There is no distension.   Musculoskeletal:         General: No deformity. Normal range of motion.      Cervical back: Normal range of motion and neck supple.   Skin:     General: Skin is warm and dry.      Findings: Lesion present. No erythema or rash.      Comments: RIJ line in place c/d/I  Picc line removed    Bilateral heel wounds dressed. Refer to media    Neurological:      Mental Status: He is alert and oriented to person, place, and time.   Psychiatric:         Mood and Affect: Mood normal.         Behavior: Behavior normal.         Thought Content: Thought content normal.         Judgment: Judgment normal.          Significant Labs: All pertinent labs within the past 24 hours have been reviewed.    Significant Imaging: I have reviewed all pertinent imaging results/findings within the past 24 hours.

## 2023-10-11 NOTE — ASSESSMENT & PLAN NOTE
On admission, has worsened since. Monitor labs. FENa 1.5%. Occasional eosinophils. No obstruction on renal ultrasound. Appreciate Nephrology, who believe it is multifactorial.

## 2023-10-11 NOTE — SUBJECTIVE & OBJECTIVE
Interval History:     Blood cultures +ESBL Klebsiella and  Acinetobacter, repeats on 10/6 NGTD.   Wound cultures + C. Albicans and achromobacter xylosoxidans  Pt remains stable, afebrile. No acute complaints or concerns       Review of Systems   Constitutional:  Negative for chills, diaphoresis, fatigue and fever.   HENT: Negative.     Eyes: Negative.    Respiratory:  Negative for cough and shortness of breath.    Cardiovascular:  Negative for chest pain, palpitations and leg swelling.   Gastrointestinal:  Negative for abdominal pain, diarrhea, nausea and vomiting.   Musculoskeletal:  Negative for arthralgias, back pain and neck pain.   Skin:  Positive for wound. Negative for rash.   Neurological:  Negative for weakness, numbness and headaches.   Psychiatric/Behavioral:  Negative for agitation.      Objective:     Vital Signs (Most Recent):  Temp: 98 °F (36.7 °C) (10/11/23 1141)  Pulse: (!) 45 (10/11/23 1141)  Resp: 19 (10/11/23 1141)  BP: (!) 159/70 (10/11/23 1141)  SpO2: (!) 94 % (10/11/23 1141) Vital Signs (24h Range):  Temp:  [97.8 °F (36.6 °C)-98.6 °F (37 °C)] 98 °F (36.7 °C)  Pulse:  [34-50] 45  Resp:  [17-20] 19  SpO2:  [92 %-96 %] 94 %  BP: (124-176)/(56-72) 159/70     Weight: 112.5 kg (248 lb)  Body mass index is 36.62 kg/m².    Estimated Creatinine Clearance: 18.6 mL/min (A) (based on SCr of 4.3 mg/dL (H)).     Physical Exam  Vitals and nursing note reviewed.   Constitutional:       General: He is not in acute distress.     Appearance: Normal appearance. He is well-developed. He is not ill-appearing or diaphoretic.   HENT:      Head: Normocephalic and atraumatic.   Eyes:      Pupils: Pupils are equal, round, and reactive to light.   Cardiovascular:      Rate and Rhythm: Normal rate and regular rhythm.      Heart sounds: Normal heart sounds. No murmur heard.  Pulmonary:      Effort: Pulmonary effort is normal. No respiratory distress.      Breath sounds: Normal breath sounds. No stridor.   Abdominal:       General: Bowel sounds are normal. There is no distension.   Musculoskeletal:         General: No deformity. Normal range of motion.      Cervical back: Normal range of motion and neck supple.   Skin:     General: Skin is warm and dry.      Findings: Lesion present. No erythema or rash.      Comments: RIJ line in place c/d/I  Picc line removed    Bilateral heel wounds dressed. Refer to media    Neurological:      Mental Status: He is alert and oriented to person, place, and time.   Psychiatric:         Mood and Affect: Mood normal.         Behavior: Behavior normal.         Thought Content: Thought content normal.         Judgment: Judgment normal.          Significant Labs: All pertinent labs within the past 24 hours have been reviewed.    Significant Imaging: I have reviewed all pertinent imaging results/findings within the past 24 hours.

## 2023-10-11 NOTE — PT/OT/SLP PROGRESS
"Physical Therapy Co-Treatment    Patient Name:  Saji Castañeda   MRN:  [id number]    Recommendations:     Discharge Recommendations: nursing facility, skilled  Discharge Equipment Recommendations: to be determined by next level of care  Barriers to discharge: Inaccessible home and Decreased caregiver support    Co-treatment performed to appropriately and safely address patient's strength and endurance deficits while facilitating functional tasks in addition to accommodating for patient's activity/pain tolerance.    Assessment:     Saji Castañeda is a 73 y.o. male admitted with a medical diagnosis of Bacteremia due to Gram-negative bacteria.  He presents with the following impairments/functional limitations: weakness, impaired cardiopulmonary response to activity, impaired endurance, impaired balance, decreased lower extremity function, impaired self care skills, impaired functional mobility, orthopedic precautions.    Pt tolerated session well, requiring SBA for all bed mobility. Session focused on sitting endurance and scooting to improve bed<>chair transfers. Pt would benefit from practicing transfer with w/c next session if appropriate.     Pt is safe to sit EOB for all meals. Nursing notified.      Rehab Prognosis: Good; patient would benefit from acute skilled PT services to address these deficits and reach maximum level of function.    Recent Surgery: Procedure(s) (LRB):  [id number] (N/A)      Plan:     During this hospitalization, patient to be seen 3 x/week to address the identified rehab impairments via gait training, therapeutic activities, therapeutic exercises, neuromuscular re-education and progress toward the following goals:    Plan of Care Expires:  11/02/23    Subjective     Chief Complaint: Itchy  Patient/Family Comments/goals: "It feels good to sit up"  Pain/Comfort:  Pain Rating 1: 0/10      Objective:     Communicated with nurse prior to session.  Patient found supine with " peripheral IV, telemetry, Condom Catheter (swan cath) upon PT entry to room.     General Precautions: Standard, fall, contact  Orthopedic Precautions: RLE partial weight bearing, LLE non weight bearing (RLE WBAT through heel with Darco)  Braces:  (darco shoe)  Respiratory Status: Room air     Functional Mobility:  Bed Mobility:     Rolling Right: stand by assistance  Scooting to L along EOB: stand by assistance  Using BUE & RLE maintaining precautions  Supine to Sit: stand by assistance  Sit to Supine: stand by assistance  Balance:   Static/Dynamic Sitting: Good, SBA    AM-PAC 6 CLICK MOBILITY  Turning over in bed (including adjusting bedclothes, sheets and blankets)?: 3  Sitting down on and standing up from a chair with arms (e.g., wheelchair, bedside commode, etc.): 2  Moving from lying on back to sitting on the side of the bed?: 3  Moving to and from a bed to a chair (including a wheelchair)?: 3  Need to walk in hospital room?: 1  Climbing 3-5 steps with a railing?: 1  Basic Mobility Total Score: 13       Treatment & Education:  Pericare with total A while supine  Sitting EOB [id number] with good trunk control  Performed grooming with OT   Patient educated on importance of sitting EOB for meals with nursing   Discussed PT plan of care during hospitalization.   Patient educated on calling for assistance.   All questions answered within PT scope of practice.     Patient left HOB elevated with all lines intact, call button in reach, and nurse notified..    GOALS:   Multidisciplinary Problems       Physical Therapy Goals          Problem: Physical Therapy    Goal Priority Disciplines Outcome Goal Variances Interventions   Physical Therapy Goal     PT, PT/OT Ongoing, Progressing     Description: Goals to be met by: 2023    Patient will increase functional independence with mobility by performin. Supine to sit with [place]- met 10/9  2. Bed to chair transfer with Contact Guard Assistance using LRAD - met  10/9  2a. Chair to bed transfer with SBA using LRAD including slideboard - not met  3. Wheelchair propulsion x100 feet with Supervision not met  4. Lower extremity exercise program x10 reps per handout, with independence - not met                         Time Tracking:     PT Received On: 10/11/23  PT Start Time: 1022     PT Stop Time: 1047  PT Total Time (min): 25 min     Billable Minutes: Therapeutic Activity 25    Treatment Type: Treatment  PT/PTA: PT     Number of PTA visits since last PT visit: 0     10/11/2023

## 2023-10-11 NOTE — PROGRESS NOTES
[facility] - Intensive Care ([address] Medicine  Progress Note    Patient Name: Saji Castañeda  MRN: [id number]  Patient Class: IP- Inpatient   Admission Date: 10/2/2023  Length of Stay: 9 days  Attending Physician: Milton Shelton MD  Primary Care Provider: [facility] -        Subjective:     Principal Problem:Bacteremia due to Gram-negative bacteria        HPI:  Saji Castañeda is a 73 year old Black man with hypertension, hyperlipidemia, diabetes mellitus type 2 with neuropathy, left ventricular concentric hypertrophy, paroxysmal atrial fibrillation (anticoagulated on apixaban), Mobitz type I atrioventricular heart block, chronic kidney disease stage 3, chronic venous hypertension, peripheral vascular disease, 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 He lives in [place], [place].               He presented to [facility] on 10/2/2023 from [facility] due to concerns of weakness and feeling like he was going to die. He reported no dizziness or headaches. He knew his name, where he was, and the current year but had trouble remembering which nursing home he came from. Blood glucose was in the 20s. He was given dextrose in the emergency department and glucose improved to 96 mg/dL. Labs showed acute kidney injury with creatinine of 2.6 mg/dL from baseline of 1.3 and lactic acid elevated at 2.29 mmol/L. He had complete heart block. He was admitted to Cardiology.      Overview/Hospital Course:  Right internal jugular central venous catheter was placed. He was found to have acute osteomyelitis of the posterior aspect of the calcaneus. He had hypotension concerning for septic shock. He was started on dobutamine, vancomycin, and piperacillin-tazobactam. Blood  culture grew Acinetobacter baumannii and Klebsiella pneumoniae. He had aphasia. Head CT and brain MRI showed no acute stroke. Infectious Disease was consulted as he had been on linezolid, cefepime, and metronidazole for osteomyelitis. PICC line was removed and IV access was replaced in anticipation for long-term IV antibiotics. Podiatry was consulted and recommended X-rays and Wound Care consult. Wound was cultured. He had acute on chronic renal failure. Renal ultrasound showed no obstruction. He was given fluid resuscitation. Heart rate was able to augment appropriately with exertion with transmission of P waves. Repeat EKG showed 2nd degree AV block replacing junctional rhythm. Electrophysiology recommended pacemaker placement. Vascular Surgery suspected poor wound healing to be due to severe edema. They ordered ankle brachial indices but it was unable to be done due to the hardness of his skin. They did not recommend amputation based on their physical exam findings. He was transferred to Hospital Medicine Team D on 10/5/2023. Infectious Disease changed piperacillin-tazobactam to cefidericol and fluconazole. He continued to have asymptomatic bradycardia. Foot wound culture grew Achromobacter xylosoxidans dentrificans and Candida albicans. Lower extremity arterial ultrasound suggested moderate stenosis of the right anterior tibial artery and severe stenosis of the right posterior tibial artery. Vascular Surgery reviewed this and felt it would not hinder his ability to heal. Electrophysiology reviewed his EKG on 10/8/2023 and noted 2nd degree heart block Mobitz I. Renal function did not improve. Fractional excretion of sodium was 1.5% suggesting intrinsic cause. Urinalysis showed pyuria and bacteriuria, similar to 2 other urinalyses done since admission, but urine culture again had no growth. Nephrology was consulted for persistent acute kidney injury and suspected ischemic acute tubular necrosis from hypoperfusion  in setting of shock (on dobutamine) and symptomatic bradycardia with additional insult from sepsis (bacteremia) and vancomycin toxicity (trough ~27 on 10/4/2023).       Interval History: Awaiting cefiderocol sensitivity report per ID. Third abnormal UA with culture not suggestive of true UTI. Monitoring for improvement of renal function. Requested central line removal as it should have been removed prior to ICU stepdown, but nursing staff using it for IV medications. It will be removed prior to discharge.     Review of Systems   Constitutional:  Negative for chills and fever.   Neurological:  Negative for seizures and syncope.     Objective:     Vital Signs (Most Recent):  Temp: 98.2 °F (36.8 °C) (10/11/23 0346)  Pulse: (!) 50 (10/11/23 0346)  Resp: 20 (10/11/23 0346)  BP: (!) 140/67 (10/11/23 0346)  SpO2: (!) 94 % (10/11/23 0346) Vital Signs (24h Range):  Temp:  [97.8 °F (36.6 °C)-98.4 °F (36.9 °C)] 98.2 °F (36.8 °C)  Pulse:  [36-50] 50  Resp:  [18-20] 20  SpO2:  [92 %-96 %] 94 %  BP: (124-176)/(56-72) 140/67     Weight: 112.5 kg (248 lb)  Body mass index is 36.62 kg/m².    Intake/Output Summary (Last 24 hours) at 10/11/2023 0733  Last data filed at 10/11/2023 0638  Gross per 24 hour   Intake 1478 ml   Output 700 ml   Net 778 ml           Physical Exam  Vitals and nursing note reviewed.   Constitutional:       General: He is not in acute distress.     Appearance: He is well-developed. He is obese. He is not diaphoretic.   Musculoskeletal:      Right lower leg: Edema present.      Left lower leg: Edema present.   Skin:     General: Skin is warm and dry.      Coloration: Skin is not jaundiced or pale.   Neurological:      Mental Status: He is alert and oriented to person, place, and time. Mental status is at baseline.      Motor: No seizure activity.   Psychiatric:         Attention and Perception: Attention normal.         Mood and Affect: Affect normal.         Behavior: Behavior is not aggressive or combative.              Significant Labs: All pertinent labs within the past 24 hours have been reviewed.  Recent Labs   Lab 10/05/23  0234 10/07/23  0959 10/09/23  0629 10/10/23  0930 10/11/23  0332      < > 138 139 138   K 4.4   < > 5.0 5.0 5.2*      < > 108 109 107   CO2 24   < > 20* 22* 21*   BUN 44*   < > 51* 54* 57*   CREATININE 3.7*   < > 4.6* 4.5* 4.3*   CALCIUM 8.7   < > 8.5* 8.3* 8.4*   PROT 6.2  --   --   --   --    BILITOT 0.3  --   --   --   --    ALKPHOS 46*  --   --   --   --    ALT 10  --   --   --   --    AST 17  --   --   --   --     < > = values in this interval not displayed.           Significant Imaging: I have reviewed all pertinent imaging results/findings within the past 24 hours.        Assessment/Plan:      * Bacteremia due to Gram-negative bacteria  On cefidericol and fluconazole. Appreciate Infectious Disease.    Asymptomatic bacteriuria  Each urine culture has pyuria and bacteriuria. Urine culture on 10/2/2023 had no growth. Urine culture on 10/4/2023 grew multiple organisms with none in predominance. Urine culture on 10/9/2023 had no growth.     Blurred vision, left eye  Reports this to be chronic. Follow up with an ophthalmologist.    History of complete heart block  AV block, 2nd degree  Electrophysiology recommends pacemaker.    Paroxysmal atrial fibrillation  Continue home apixaban.     AV block, 2nd degree  Seen by Electrophysiology, who recommend pacemaker placement.    Nonhealing ulcer of right lower leg with fat layer exposed  Edema of leg  Vascular Surgery believes edema contributes to poor wound healing. They did not recommend amputation. They recommended compression wrappings. Consulted Wound Care for this. Already on medical management for PAD with clopidrogel, apixaban, atorvastatin.     Chronic deep vein thrombosis (DVT) of right upper extremity  Continue home apixaban.    Peripheral arterial disease  Continue home clopidrogel and atorvastatin. Evaluated by Interventional  Cardiology in May and deemed not to be a revascularization candidate due to extensive disease, multiple comorbidities, debility/bed bound status and concern for noncompliance.    Acute on chronic renal failure  On admission, has worsened since. Monitor labs. FENa 1.5%. Occasional eosinophils. No obstruction on renal ultrasound. Appreciate Nephrology, who believe it is multifactorial.    Insulin dependent type 2 diabetes mellitus  Holding home glipizide. He takes insulin glargine 45 units HS and insulin aspart 12 units TID. Giving insulin detemir 25 units HS, insulin aspart 5 units TID and sliding scale. Monitor Accuchecks.      VTE Risk Mitigation (From admission, onward)         Ordered     apixaban tablet 5 mg  2 times daily         10/02/23 1544     IP VTE HIGH RISK PATIENT  Once         10/02/23 1513     Place sequential compression device  Until discontinued         10/02/23 1513                Discharge Planning   OXANA: 10/12/2023     Code Status: Full Code   Is the patient medically ready for discharge?: No    Reason for patient still in hospital (select all that apply): Patient trending condition, Laboratory test and Treatment  Discharge Plan A: Home Health                  Milton Shelton MD  Department of Hospital Medicine   [facility] - Intensive Care ([place]-16)

## 2023-10-11 NOTE — SUBJECTIVE & OBJECTIVE
Interval History: Awaiting cefiderocol sensitivity report per ID. Third abnormal UA with culture not suggestive of true UTI. Monitoring for improvement of renal function. Requested central line removal as it should have been removed prior to ICU stepdown, but nursing staff using it for IV medications. It will be removed prior to discharge.     Review of Systems   Constitutional:  Negative for chills and fever.   Neurological:  Negative for seizures and syncope.     Objective:     Vital Signs (Most Recent):  Temp: 98.2 °F (36.8 °C) (10/11/23 0346)  Pulse: (!) 50 (10/11/23 0346)  Resp: 20 (10/11/23 0346)  BP: (!) 140/67 (10/11/23 0346)  SpO2: (!) 94 % (10/11/23 0346) Vital Signs (24h Range):  Temp:  [97.8 °F (36.6 °C)-98.4 °F (36.9 °C)] 98.2 °F (36.8 °C)  Pulse:  [36-50] 50  Resp:  [18-20] 20  SpO2:  [92 %-96 %] 94 %  BP: (124-176)/(56-72) 140/67     Weight: 112.5 kg (248 lb)  Body mass index is 36.62 kg/m².    Intake/Output Summary (Last 24 hours) at 10/11/2023 0733  Last data filed at 10/11/2023 0638  Gross per 24 hour   Intake 1478 ml   Output 700 ml   Net 778 ml           Physical Exam  Vitals and nursing note reviewed.   Constitutional:       General: He is not in acute distress.     Appearance: He is well-developed. He is obese. He is not diaphoretic.   Musculoskeletal:      Right lower leg: Edema present.      Left lower leg: Edema present.   Skin:     General: Skin is warm and dry.      Coloration: Skin is not jaundiced or pale.   Neurological:      Mental Status: He is alert and oriented to person, place, and time. Mental status is at baseline.      Motor: No seizure activity.   Psychiatric:         Attention and Perception: Attention normal.         Mood and Affect: Affect normal.         Behavior: Behavior is not aggressive or combative.             Significant Labs: All pertinent labs within the past 24 hours have been reviewed.  Recent Labs   Lab 10/05/23  0234 10/07/23  0959 10/09/23  0629 10/10/23  0930  10/11/23  0332      < > 138 139 138   K 4.4   < > 5.0 5.0 5.2*      < > 108 109 107   CO2 24   < > 20* 22* 21*   BUN 44*   < > 51* 54* 57*   CREATININE 3.7*   < > 4.6* 4.5* 4.3*   CALCIUM 8.7   < > 8.5* 8.3* 8.4*   PROT 6.2  --   --   --   --    BILITOT 0.3  --   --   --   --    ALKPHOS 46*  --   --   --   --    ALT 10  --   --   --   --    AST 17  --   --   --   --     < > = values in this interval not displayed.           Significant Imaging: I have reviewed all pertinent imaging results/findings within the past 24 hours.

## 2023-10-11 NOTE — ANESTHESIA PREPROCEDURE EVALUATION
[facility]  Anesthesia Pre-Operative Evaluation   10/11/2023        Saji Castañeda, 1950  [id number]  Procedure(s) (LRB):  [id number] (N/A)    Subjective    Saji Castañeda is a 73 y.o. male w/ a significant PMHx of HTN, DM, AF, Mobitz type 1, KD stage III,PAD, DVT who presented with chronic fatigue found to be in complete heart block and hypotensive. Also was found to have acute  Osteomyelitis of calcaneus. Course c/b YNES.   Patient now presents for above procedure(s).  Cardiology planning for leadless pacemaker placement.     Prev Airway: None documented.           Percutaneous Central Line Insertion/Assessment - Cordis 10/02/23 1650 Internal Jugular Right (Active)   Line Necessity Review Poor venous access 10/10/23 2158   Dressing dressing dry and intact 10/10/23 2158   Securement catheter securement device utilized 10/10/23 2158   Patency/Care normal saline locked 10/10/23 2158   Dressing Change Due 10/16/23 10/10/23 2158   Daily Line Review Performed 10/10/23 2158   Number of days: 8            Peripheral IV - Single Lumen 10/10/23 1048 20 G;1 3/4 in Right Forearm (Active)   Site Assessment Clean;Dry;Intact;No redness;No swelling 10/10/23 2158   Extremity Assessment Distal to IV No abnormal discoloration;No redness;No swelling;No warmth 10/10/23 2158   Line Status Flushed;Infusing 10/10/23 2158   Dressing Status Clean;Dry;Intact 10/10/23 2158   Dressing Intervention Integrity maintained 10/10/23 2158   Dressing Change Due 10/14/23 10/10/23 2158   Site Change Due 10/14/23 10/10/23 2158   Reason Not Rotated Not due 10/10/23 2158   Number of days: 0       Male External Urinary Catheter 10/03/23 1958 Other (Comment) (Active)   Collection Container Urimeter 10/10/23 2158   Securement Method secured to top of thigh w/ adhesive device 10/10/23 2158   Skin no redness;no breakdown 10/10/23 2158   Tolerance no signs/symptoms of discomfort 10/10/23 2158   Output (mL) 200 mL 10/10/23  0800   Catheter Change Date 10/10/23 10/10/23 2158   Catheter Change Time 2130 10/10/23 2158   Number of days: 7             Patient Active Problem List   Diagnosis    Edema of leg    Tinea pedis    Wound, open, foot with complication    Diabetic neuropathy    Essential hypertension    Insulin dependent type 2 diabetes mellitus    Cataract cortical, senile    Acute on chronic renal failure    Anemia of chronic disease    Asymptomatic Mobitz (type) I (Wenckebach's) atrioventricular block    Peripheral arterial disease    PVD (peripheral vascular disease)    Cellulitis of left ankle    Osteomyelitis of left lower extremity    Pressure injury of left heel, stage 4    Morbid obesity    Iron deficiency anemia    Bacteremia due to Gram-negative bacteria    Leg swelling    Foot pain, left    Chronic deep vein thrombosis (DVT) of right upper extremity    Goals of care, counseling/discussion    Nonhealing ulcer of right lower leg with fat layer exposed    Hypergranulation    Subacute osteomyelitis of left foot    Osteomyelitis of right lower extremity    Chronic osteomyelitis    Hyperglycemia due to diabetes mellitus    AV block, 2nd degree    Paroxysmal atrial fibrillation    History of complete heart block    Blurred vision, left eye    Asymptomatic bacteriuria       Review of patient's allergies indicates:  No Known Allergies    Current Inpatient Medications:    acetaminophen  650 mg Oral Q6H    [START ON 10/13/2023] apixaban  5 mg Oral BID    ascorbic acid (vitamin C)  500 mg Oral BID    atorvastatin  80 mg Oral QHS    cefiderocoL 1 g in dextrose 5 % (D5W) 100 mL infusion  1 g Intravenous Q8H    [START ON 10/13/2023] clopidogreL  75 mg Oral Daily    fluconazole  200 mg Oral Daily    gabapentin  100 mg Oral BID    hydrALAZINE  100 mg Oral Q8H    insulin aspart U-100  5 Units Subcutaneous TIDWM    insulin detemir U-100  25 Units Subcutaneous QHS    miconazole NITRATE 2 %    Topical (Top) BID    multivitamin  1 tablet Oral Daily    pantoprazole  40 mg Oral Daily    tamsulosin  0.4 mg Oral Daily       No current facility-administered medications on file prior to encounter.     Current Outpatient Medications on File Prior to Encounter   Medication Sig Dispense Refill    acetaminophen (TYLENOL) 325 MG tablet Take 650 mg by mouth every 6 (six) hours as needed for Temperature greater than.      acidophilus-pectin, citrus 100 million cell-10 mg Cap Take 1 capsule by mouth 2 (two) times a day.      apixaban (ELIQUIS) 5 mg Tab Take 1 tablet (5 mg total) by mouth 2 (two) times daily. 60 tablet 0    ascorbic acid, vitamin C, (VITAMIN C) 500 MG tablet Take 500 mg by mouth 2 (two) times daily.      B COMPLEX-VITAMIN B12 tablet Take 1 tablet by mouth once daily.      clopidogreL (PLAVIX) 75 mg tablet Take 1 tablet (75 mg total) by mouth once daily. 60 tablet 0    diphenhydrAMINE (BENADRYL) 25 mg capsule No directions listed on the patients medication list.      furosemide (LASIX) 40 MG tablet Take 40 mg by mouth once daily.      gabapentin (NEURONTIN) 100 MG capsule Take 2 capsules (200 mg total) by mouth 2 (two) times daily. (Patient taking differently: Take 300 mg by mouth once daily.) 120 capsule 1    glipiZIDE (GLUCOTROL) 10 MG tablet Take 20 mg by mouth 2 (two) times a day.      insulin aspart U-100 (NOVOLOG) 100 unit/mL (3 mL) InPn pen Inject 12 Units into the skin 3 (three) times daily with meals. (Patient taking differently: Inject 3 Units into the skin 3 (three) times daily with meals.) 30 mL 3    isosorbide dinitrate (ISORDIL) 10 MG tablet Take 1 tablet (10 mg total) by mouth 3 (three) times daily. 90 tablet 11    LANTUS SOLOSTAR U-100 INSULIN glargine 100 units/mL SubQ pen Inject 45 Units into the skin every evening. (Patient taking differently: Inject 45 Units into the skin once daily.)  0    losartan-hydrochlorothiazide 100-25 mg (HYZAAR) 100-25 mg per tablet Take 1  "tablet by mouth once daily.      metFORMIN (GLUMETZA) 1000 MG (MOD) 24hr tablet Take 1,000 mg by mouth Daily.      multivitamin (THERAGRAN) per tablet Take 1 tablet by mouth once daily.      oxyCODONE 5 mg TbOr Take 1 tablet by mouth every 6 (six) hours as needed (Pain (Max 5 daily)).      pantoprazole (PROTONIX) 40 MG tablet Take 40 mg by mouth once daily. Before breakfast      rosuvastatin (CRESTOR) 40 MG Tab Take 40 mg by mouth once daily.      sodium hypochlorite 0.5 % (DAKIN'S SOLUTION) external solution Apply topically once daily.      tamsulosin (FLOMAX) 0.4 mg Cap Take 0.4 mg by mouth once daily.      triamcinolone acetonide 0.025% (KENALOG) 0.025 % Oint Apply topically 2 (two) times daily as needed (to affected area).      BD ULTRA-FINE ADITYA PEN NEEDLE 32 gauge x 5/32" Ndle USE FOUR TIMES DAILY WITH MEALS AND NIGHTLY 100 each 0    blood sugar diagnostic (CONTOUR TEST STRIPS) Strp Inject 1 strip into the skin 2 (two) times daily before meals. 100 strip 6    MICROLET LANCET Misc   0    pen needle, diabetic 32 gauge x 5/32" Ndle use as directed with insulin 100 each 2    [DISCONTINUED] hydroCHLOROthiazide (HYDRODIURIL) 25 MG tablet Take 0.5 tablets (12.5 mg total) by mouth every Mon, Wed, Fri. Beginning on 7/4/2022         Past Surgical History:   Procedure Laterality Date    ANGIOGRAPHY OF LOWER EXTREMITY N/A 2/3/2021    Procedure: Angiogram Extremity Bilateral;  Surgeon: Ernst Chacko MD;  Location: [facility] OR [address] [place] [address];  Service: Peripheral Vascular;  Laterality: N/A;  7.4 mintues fluoroscopy time  816.15 mGy  170.17 Gycm2    AORTOGRAPHY WITH EXTREMITY RUNOFF Bilateral 2/3/2021    Procedure: AORTOGRAM, WITH EXTREMITY RUNOFF;  Surgeon: Ernst Chacko MD;  Location: [facility] OR [address] [place] [address];  Service: Peripheral Vascular;  Laterality: Bilateral;    DEBRIDEMENT OF FOOT Left 2/23/2021    Procedure: DEBRIDEMENT, LEFT HEEL;  Surgeon: Mayra Schroeder DPM;  Location: [facility] OR [address] [place] [address];  " Service: Podiatry;  Laterality: Left;    FOOT AMPUTATION  October 2010    left high midfoot amputation       Social History:  Tobacco Use: Low Risk  (10/4/2023)    Patient History     Smoking Tobacco Use: Never     Smokeless Tobacco Use: Never     Passive Exposure: Not on file       Alcohol Use: Not At Risk (10/3/2023)    AUDIT-C     Frequency of Alcohol Consumption: Never     Average Number of Drinks: Patient does not drink     Frequency of Binge Drinking: Never       Objective    Vital Signs Range:  BMI Readings from Last 1 Encounters:   10/03/23 36.62 kg/m²       Temp:  [36.8 °C (98.2 °F)-37 °C (98.6 °F)]   Pulse:  [34-50]   Resp:  [17-20]   BP: (124-146)/(56-68)   SpO2:  [92 %-96 %]        Significant Labs:        Component Value Date/Time    WBC 8.40 10/09/2023 0629    HGB 8.4 (L) 10/09/2023 0629    HCT 27.4 (L) 10/09/2023 0629     10/09/2023 0629     10/11/2023 0332    K 5.2 (H) 10/11/2023 0332     10/11/2023 0332    CO2 21 (L) 10/11/2023 0332     10/11/2023 0332    BUN 57 (H) 10/11/2023 0332    CREATININE 4.3 (H) 10/11/2023 0332    MG 2.2 10/05/2023 0234    PHOS 3.7 10/11/2023 0332    CALCIUM 8.4 (L) 10/11/2023 0332    ALBUMIN 2.2 (L) 10/11/2023 0332    PROT 6.2 10/05/2023 0234    ALKPHOS 46 (L) 10/05/2023 0234    BILITOT 0.3 10/05/2023 0234    AST 17 10/05/2023 0234    ALT 10 10/05/2023 0234    INR 1.1 09/18/2022 1538    HGBA1C 11.0 (H) 09/13/2023 0616    HGBA1C 8.4 (H) 03/10/2023 0608        Please see Results Review for additional labs.     Diagnostic Studies: All relevant studies, reviewed.      EKG:   Results for orders placed or performed during the hospital encounter of 10/02/23   EKG 12-lead    Collection Time: 10/04/23 10:56 AM    Narrative    Test Reason : I44.2,    Vent. Rate : 060 BPM     Atrial Rate : 102 BPM     P-R Int : 000 ms          QRS Dur : 082 ms      QT Int : 418 ms       P-R-T Axes : 000 049 037 degrees     QTc Int : 418 ms    Sinus tachycardia with 2nd  degree A-V block (Mobitz I)  Low voltage QRS  Abnormal ECG  When compared with ECG of 03-OCT-2023 05:14,  Sinus rhythm has replaced Junctional rhythm  Confirmed by Neyda Lake MD ([id number]) on 10/4/2023 12:44:48 PM    Referred By: RICKIE   SELF           Confirmed By:Neyda Lake MD       ECHO:  Results for orders placed during the hospital encounter of 10/02/23    Echo Saline Bubble? No    Interpretation Summary    Left Ventricle: The left ventricle is normal in size. Ventricular mass is normal. Increased wall thickness. There is concentric remodeling. Normal wall motion. There is normal systolic function with a visually estimated ejection fraction of 60 - 65%. Ejection fraction by visual approximation is 65%. There is normal diastolic function.    Right Ventricle: Normal right ventricular cavity size. Wall thickness is normal. Right ventricle wall motion  is normal. Systolic function is normal.    Aortic Valve: There is aortic valve sclerosis.    Mitral Valve: There is mild mitral annular calcification present.    IVC/SVC: Normal venous pressure at 3 mmHg.      Pre-op Assessment    I have reviewed the Patient Summary Reports.     I have reviewed the Nursing Notes. I have reviewed the NPO Status.   I have reviewed the Medications.     Review of Systems  Anesthesia Hx:  Neg history of prior surgery. Denies Family Hx of Anesthesia complications.   Denies Personal Hx of Anesthesia complications.   Social:  Non-Smoker    Hematology/Oncology:         -- Denies Anemia:   Cardiovascular:   Hypertension Denies MI.   Denies CABG/stent. Dysrhythmias   Denies CHF. ECG has been reviewed.    Pulmonary:   Denies COPD.  Denies Asthma.    Renal/:   Chronic Renal Disease    Hepatic/GI:   Denies GERD. Denies Liver Disease.    Musculoskeletal:  Denies Spine Disorders    Neurological:   Denies CVA. Denies Seizures.    Endocrine:   Diabetes Denies Hypothyroidism.        Physical Exam  General: Well nourished, Cooperative,  Alert and Oriented    Airway:  Mallampati: II   Neck ROM: Normal ROM    Dental:  Has one tooth      Anesthesia Plan  Type of Anesthesia, risks & benefits discussed:    Anesthesia Type: Gen Supraglottic Airway, Gen Natural Airway, MAC  Intra-op Monitoring Plan: Standard ASA Monitors  Post Op Pain Control Plan: multimodal analgesia and IV/PO Opioids PRN  Induction:  IV  Airway Plan: Direct and Video  Informed Consent: Informed consent signed with the Patient and all parties understand the risks and agree with anesthesia plan.  All questions answered. Patient consented to blood products? Yes  ASA Score: 3  Day of Surgery Review of History & Physical: H&P Update referred to the surgeon/provider.    Ready For Surgery From Anesthesia Perspective.     .

## 2023-10-11 NOTE — ASSESSMENT & PLAN NOTE
Each urine culture has pyuria and bacteriuria. Urine culture on 10/2/2023 had no growth. Urine culture on 10/4/2023 grew multiple organisms with none in predominance. Urine culture on 10/9/2023 had no growth.

## 2023-10-11 NOTE — PROGRESS NOTES
[facility] - [facility] ([place] [address])  Cardiac Electrophysiology  Progress Note    Admission Date: 10/2/2023  Code Status: Full Code   Attending Physician: Milton Shelton MD   Expected Discharge Date: 10/12/2023  Principal Problem:Bacteremia due to Gram-negative bacteria    Subjective:     Interval History: ID susceptibilities pending. Telemetry reviewed, patient is in complete heart block with narrow escape rate 48.      Objective:     Vital Signs (Most Recent):  Temp: 98.6 °F (37 °C) (10/11/23 0759)  Pulse: (!) 48 (10/11/23 0759)  Resp: 17 (10/11/23 0759)  BP: (!) 146/68 (10/11/23 0759)  SpO2: 96 % (10/11/23 0759) Vital Signs (24h Range):  Temp:  [97.8 °F (36.6 °C)-98.6 °F (37 °C)] 98.6 °F (37 °C)  Pulse:  [34-50] 48  Resp:  [17-20] 17  SpO2:  [92 %-96 %] 96 %  BP: (124-176)/(56-72) 146/68     Weight: 112.5 kg (248 lb)  Body mass index is 36.62 kg/m².     SpO2: 96 %        Physical Exam  Vitals reviewed.   Constitutional:       Appearance: He is well-developed.   HENT:      Head: Normocephalic and atraumatic.   Eyes:      Conjunctiva/sclera: Conjunctivae normal.      Pupils: Pupils are equal, round, and reactive to light.   Neck:      Vascular: Normal carotid pulses. No carotid bruit or JVD.   Cardiovascular:      Rate and Rhythm: Regular rhythm. Bradycardia present.      Heart sounds: No murmur heard.     No friction rub. No gallop.   Pulmonary:      Effort: Pulmonary effort is normal. No respiratory distress.   Abdominal:      General: There is no distension.      Palpations: Abdomen is soft.      Tenderness: There is no abdominal tenderness.   Musculoskeletal:      Cervical back: Normal range of motion and neck supple.   Skin:     General: Skin is warm and dry.      Nails: There is no clubbing.   Neurological:      Mental Status: He is alert and oriented to person, place, and time.   Psychiatric:         Behavior: Behavior normal.            Significant Labs: All pertinent lab results from the last 24  hours have been reviewed.        Assessment and Plan:     History of complete heart block  Plan for leadless pacemaker insertion tomorrow  NPO after midnight  Hold morning eliquis dose tomorrow          Mason Lawson MD  Cardiac Electrophysiology  [facility] - Intensive Care ([facility]

## 2023-10-11 NOTE — ASSESSMENT & PLAN NOTE
Edema of leg  Vascular Surgery believes edema contributes to poor wound healing. They did not recommend amputation. They recommended compression wrappings. Consulted Wound Care for this. Already on medical management for PAD with clopidrogel, apixaban, atorvastatin.

## 2023-10-11 NOTE — PHYSICIAN QUERY
PT Name: Saji Castañeda  MR #: [id number]     DOCUMENTATION CLARIFICATION      CDS: Darwin Guerrero RN CCDS               Contact information: [email or web address]      This form is a permanent document in the medical record.     Query Date: October 11, 2023    Dear Provider,  By submitting this query, we are merely seeking further clarification of documentation.  Please utilize your independent clinical judgment when addressing the question(s) below.     The Medical Record contains the following:    Supporting Clinical Findings Location in Medical Record   concerning for septic shock  EKG with bradycardia and complete heart block so admitted to CCU. Started on dobutamine for inotropic support in setting of hypertension. Pt started on Vanc and Zosyn. BCx +GNR bacteremia with PCR positive for Acinetobacter.    Complete heart block   Plan:  -continue dobutamine ftt    Skin is warm and dry. He is alert and oriented to person, place, and time 10/3/2023 Cardiology progress notes   Observed to be in sinus rhythm with complete AV block with a junctional escape of 40bpm, improved with exercise and resolved with dobutamine.  10/3/2023 Arrhythmia consult   He had hypotension concerning for septic shock. He was started on dobutamine, vancomycin, and piperacillin-tazobactam. 10/5/2023 Hospital Medicine progress notes   Suspect YNES from ischemic ATN from hypoperfusion in setting of shock (on dobutamine) and symptomatic bradycardia. Additional insult from Sepsis (bacteremia) and Vancomycin toxicity (trough ~27 on 10/4).  10/10/2023 Nephrology consult     BPs: 191/83 (), 191/81 (), 192/86 (), 189/85 ()  HR: 41-60    BPs: 177/72 (), 169/74 (), 167/75 (), 182/114 ()  HR: 58-75 10/2/2023 Vitals    10/3/2023 Vitals    10/02/23 12:19   POC Lactate 2.29 (H)    Lab value   DOBUtamine 1000 mg infusion @ 2.5 mcg/kg/min 10/2-10/4/2023 Medication     Please clarify if the Septic Shock  diagnosis has been:    [   ] Ruled Out   [  x ] Ruled In   [   ] Other/Clarification of findings (please specify): _______________    [   ] Clinically undetermined         Please document in your progress notes daily for the duration of treatment, until resolved, and include in your discharge summary.    Form No. [id number]

## 2023-10-11 NOTE — ASSESSMENT & PLAN NOTE
"Non Oliguric YNES on baseline CKD III with sub nephrotic range protienuira (1.15g).   - baselien Scr ~1-1.4; Scr at time of consultation ~4.5  - UA with 2+ protein; Trace blood; 2 RBC.   - Renal US "The right kidney measures 12.1 cm.; The left kidney measures 12.8 cm.; MRD"   - Suspect YNES from ischemic ATN from hypoperfusion in setting of shock (on dobutamine) and symptomatic bradycardia. Additional insult from Sepsis (bacteremia) and Vancomycin toxicity (trough ~27 on 10/4).   - Scr 4.5-->4.3 today.   Plan:  - Obtain post void residual (patient with partially distended bladder on US).   - Will perform urine micro today  - Repeat vanc trough today  - Avoid drastic changes in BP  - strict ins and outs   - Renally dose all medications  - avoid nephrotoxic agents  "

## 2023-10-11 NOTE — SUBJECTIVE & OBJECTIVE
Interval History:     No acute events overnight. Patient tolerating diet well. Denies further changes at this time.     Review of patient's allergies indicates:  No Known Allergies  Current Facility-Administered Medications   Medication Frequency    acetaminophen tablet 650 mg Q6H    apixaban tablet 5 mg BID    ascorbic acid (vitamin C) tablet 500 mg BID    atorvastatin tablet 80 mg QHS    bisacodyL EC tablet 5 mg Daily PRN    cefiderocoL 1 g in dextrose 5 % (D5W) 100 mL infusion Q8H    clopidogreL tablet 75 mg Daily    fluconazole tablet 200 mg Daily    gabapentin capsule 100 mg BID    glucagon (human recombinant) injection 1 mg PRN    glucose chewable tablet 16 g PRN    glucose chewable tablet 24 g PRN    hydrALAZINE tablet 100 mg Q8H    HYDROmorphone injection 0.5 mg Q6H PRN    insulin aspart U-100 pen 0-5 Units QID (AC + HS) PRN    insulin aspart U-100 pen 5 Units TIDWM    insulin detemir U-100 (Levemir) pen 25 Units QHS    miconazole NITRATE 2 % top powder BID    multivitamin tablet Daily    pantoprazole EC tablet 40 mg Daily    sodium chloride 0.9% flush 10 mL PRN    tamsulosin 24 hr capsule 0.4 mg Daily       Objective:     Vital Signs (Most Recent):  Temp: 98.6 °F (37 °C) (10/11/23 0759)  Pulse: (!) 48 (10/11/23 0759)  Resp: 17 (10/11/23 0759)  BP: (!) 146/68 (10/11/23 0759)  SpO2: 96 % (10/11/23 0759) Vital Signs (24h Range):  Temp:  [97.8 °F (36.6 °C)-98.6 °F (37 °C)] 98.6 °F (37 °C)  Pulse:  [34-50] 48  Resp:  [17-20] 17  SpO2:  [92 %-96 %] 96 %  BP: (124-176)/(56-72) 146/68     Weight: 112.5 kg (248 lb) (10/03/23 1006)  Body mass index is 36.62 kg/m².  Body surface area is 2.34 meters squared.    I/O last 3 completed shifts:  In: 1828 [P.O.:1828]  Out: 1050 [Urine:1050]     Physical Exam  Vitals and nursing note reviewed.   Constitutional:       General: He is not in acute distress.     Appearance: Normal appearance. He is well-developed. He is obese. He is not ill-appearing or diaphoretic.   HENT:       Head: Normocephalic and atraumatic.   Eyes:      Pupils: Pupils are equal, round, and reactive to light.   Cardiovascular:      Rate and Rhythm: Normal rate and regular rhythm.      Heart sounds: Normal heart sounds. No murmur heard.  Pulmonary:      Effort: Pulmonary effort is normal. No respiratory distress.      Breath sounds: Normal breath sounds. No stridor.   Abdominal:      General: Bowel sounds are normal. There is no distension.   Musculoskeletal:         General: No deformity. Normal range of motion.      Cervical back: Normal range of motion and neck supple.      Right lower leg: Edema present.      Left lower leg: Edema present.   Skin:     General: Skin is warm and dry.      Coloration: Skin is not jaundiced or pale.      Findings: Lesion present. No erythema or rash.      Comments: RIJ line in place c/d/I  Picc line removed    Bilateral heel wounds dressed. Refer to media    Neurological:      Mental Status: He is alert and oriented to person, place, and time. Mental status is at baseline.      Motor: No seizure activity.   Psychiatric:         Attention and Perception: Attention normal.         Mood and Affect: Mood and affect normal.         Behavior: Behavior normal. Behavior is not aggressive or combative.         Thought Content: Thought content normal.         Judgment: Judgment normal.          Significant Labs:  All labs within the past 24 hours have been reviewed.     Significant Imaging:  Labs: Reviewed

## 2023-10-11 NOTE — PLAN OF CARE
Problem: Adult Inpatient Plan of Care  Goal: Plan of Care Review  Outcome: Ongoing, Progressing  Goal: Optimal Comfort and Wellbeing  Outcome: Ongoing, Progressing     Problem: Diabetes Comorbidity  Goal: Blood Glucose Level Within Targeted Range  Outcome: Ongoing, Progressing     Problem: Infection  Goal: Absence of Infection Signs and Symptoms  Outcome: Ongoing, Progressing     Problem: Skin Injury Risk Increased  Goal: Skin Health and Integrity  Outcome: Ongoing, Progressing     Problem: Impaired Wound Healing  Goal: Optimal Wound Healing  Outcome: Ongoing, Progressing

## 2023-10-11 NOTE — PT/OT/SLP PROGRESS
Speech Language Pathology      Saji Castañeda  MRN: [id number]    Patient not seen today secondary to  (pt working with PT/OT at time of attempt - SLP schedule did not permit SLP to re-attempt on this service date.). Will follow-up according to POC.

## 2023-10-11 NOTE — PROGRESS NOTES
[facility] ([place] [id number])  Nephrology  Progress Note    Patient Name: Saji Castañeda  MRN: [id number]  Admission Date: 10/2/2023  Hospital Length of Stay: 9 days  Attending Provider: Milton Shelton MD   Primary Care Physician: [facility] -  Principal Problem:Bacteremia due to Gram-negative bacteria    Subjective:     HPI: 73 year old Black man with hypertension, hyperlipidemia, diabetes mellitus type 2 with neuropathy, left ventricular concentric hypertrophy, paroxysmal atrial fibrillation (anticoagulated on apixaban), Mobitz type I atrioventricular heart block, chronic kidney disease stage 3, chronic venous hypertension, peripheral vascular disease, 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 Patient presnted to [facility] with complaints of fatigue. He was found to be in complete heart block and hypotensive, Additionally noted to have acute osteomyelitis of the posterior aspect of the calcaneus. He had hypotension concerning for septic shock. He was started on dobutamine, vancomycin, and piperacillin-tazobactam. Blood culture grew Acinetobacter baumannii and Klebsiella pneumoniae. Hosptial course complicated by YNES, for which nephrology was consulted for.       Interval History:     No acute events overnight. Patient tolerating diet well. Denies further changes at this time.     Review of patient's allergies indicates:  No Known Allergies  Current Facility-Administered Medications   Medication Frequency    acetaminophen tablet 650 mg Q6H    apixaban tablet 5 mg BID    ascorbic acid (vitamin C) tablet 500 mg BID    atorvastatin tablet 80 mg QHS    bisacodyL EC tablet 5 mg Daily PRN    cefiderocoL 1 g in dextrose 5 % (D5W) 100 mL infusion Q8H    clopidogreL tablet 75 mg Daily     fluconazole tablet 200 mg Daily    gabapentin capsule 100 mg BID    glucagon (human recombinant) injection 1 mg PRN    glucose chewable tablet 16 g PRN    glucose chewable tablet 24 g PRN    hydrALAZINE tablet 100 mg Q8H    HYDROmorphone injection 0.5 mg Q6H PRN    insulin aspart U-100 pen 0-5 Units QID (AC + HS) PRN    insulin aspart U-100 pen 5 Units TIDWM    insulin detemir U-100 (Levemir) pen 25 Units QHS    miconazole NITRATE 2 % top powder BID    multivitamin tablet Daily    pantoprazole EC tablet 40 mg Daily    sodium chloride 0.9% flush 10 mL PRN    tamsulosin 24 hr capsule 0.4 mg Daily       Objective:     Vital Signs (Most Recent):  Temp: 98.6 °F (37 °C) (10/11/23 0759)  Pulse: (!) 48 (10/11/23 0759)  Resp: 17 (10/11/23 0759)  BP: (!) 146/68 (10/11/23 0759)  SpO2: 96 % (10/11/23 0759) Vital Signs (24h Range):  Temp:  [97.8 °F (36.6 °C)-98.6 °F (37 °C)] 98.6 °F (37 °C)  Pulse:  [34-50] 48  Resp:  [17-20] 17  SpO2:  [92 %-96 %] 96 %  BP: (124-176)/(56-72) 146/68     Weight: 112.5 kg (248 lb) (10/03/23 1006)  Body mass index is 36.62 kg/m².  Body surface area is 2.34 meters squared.    I/O last 3 completed shifts:  In: 1828 [P.O.:1828]  Out: 1050 [Urine:1050]     Physical Exam  Vitals and nursing note reviewed.   Constitutional:       General: He is not in acute distress.     Appearance: Normal appearance. He is well-developed. He is obese. He is not ill-appearing or diaphoretic.   HENT:      Head: Normocephalic and atraumatic.   Eyes:      Pupils: Pupils are equal, round, and reactive to light.   Cardiovascular:      Rate and Rhythm: Normal rate and regular rhythm.      Heart sounds: Normal heart sounds. No murmur heard.  Pulmonary:      Effort: Pulmonary effort is normal. No respiratory distress.      Breath sounds: Normal breath sounds. No stridor.   Abdominal:      General: Bowel sounds are normal. There is no distension.   Musculoskeletal:         General: No deformity. Normal range of motion.       "Cervical back: Normal range of motion and neck supple.      Right lower leg: Edema present.      Left lower leg: Edema present.   Skin:     General: Skin is warm and dry.      Coloration: Skin is not jaundiced or pale.      Findings: Lesion present. No erythema or rash.      Comments: RIJ line in place c/d/I  Picc line removed    Bilateral heel wounds dressed. Refer to media    Neurological:      Mental Status: He is alert and oriented to person, place, and time. Mental status is at baseline.      Motor: No seizure activity.   Psychiatric:         Attention and Perception: Attention normal.         Mood and Affect: Mood and affect normal.         Behavior: Behavior normal. Behavior is not aggressive or combative.         Thought Content: Thought content normal.         Judgment: Judgment normal.          Significant Labs:  All labs within the past 24 hours have been reviewed.     Significant Imaging:  Labs: Reviewed    Assessment/Plan:     Cardiac/Vascular  History of complete heart block  Per primary team.     Renal/  Acute on chronic renal failure  Non Oliguric YNES on baseline CKD III with sub nephrotic range protienuira (1.15g).   - baselien Scr ~1-1.4; Scr at time of consultation ~4.5  - UA with 2+ protein; Trace blood; 2 RBC.   - Renal US "The right kidney measures 12.1 cm.; The left kidney measures 12.8 cm.; MRD"   - Suspect YNES from ischemic ATN from hypoperfusion in setting of shock (on dobutamine) and symptomatic bradycardia. Additional insult from Sepsis (bacteremia) and Vancomycin toxicity (trough ~27 on 10/4).   - Scr 4.5-->4.3 today.   Plan:  - Obtain post void residual (patient with partially distended bladder on US).   - Will perform urine micro today  - Repeat vanc trough today  - Avoid drastic changes in BP  - strict ins and outs   - Renally dose all medications  - avoid nephrotoxic agents    ID  Chronic osteomyelitis  Per primary team.     Orthopedic  Nonhealing ulcer of right lower leg with fat " layer exposed  Per primary team.         Thank you for your consult. I will follow-up with patient. Please contact us if you have any additional questions.     Case discussed with attending. Attestation to follow.       Terrell Mcdonnell DO  Nephrology  [facility] ([place] [address])    ATTENDING PHYSICIAN ATTESTATION  I have personally verified the history and examined the patient. I thoroughly reviewed the demographic, clinical, laboratorial and imaging information available in medical records. I agree with the assessment and recommendations provided by the subspecialty resident who was under my supervision.

## 2023-10-11 NOTE — PLAN OF CARE
Problem: Adult Inpatient Plan of Care  Goal: Plan of Care Review  Outcome: Ongoing, Progressing  Goal: Patient-Specific Goal (Individualized)  Outcome: Ongoing, Progressing  Goal: Optimal Comfort and Wellbeing  Outcome: Ongoing, Progressing  Goal: Readiness for Transition of Care  Outcome: Ongoing, Progressing     Problem: Diabetes Comorbidity  Goal: Blood Glucose Level Within Targeted Range  Outcome: Ongoing, Progressing     Problem: Adjustment to Illness (Sepsis/Septic Shock)  Goal: Optimal Coping  Outcome: Ongoing, Progressing     Problem: Bleeding (Sepsis/Septic Shock)  Goal: Absence of Bleeding  Outcome: Ongoing, Progressing     Problem: Glycemic Control Impaired (Sepsis/Septic Shock)  Goal: Blood Glucose Level Within Desired Range  Outcome: Ongoing, Progressing     Problem: Infection Progression (Sepsis/Septic Shock)  Goal: Absence of Infection Signs and Symptoms  Outcome: Ongoing, Progressing     Problem: Infection  Goal: Absence of Infection Signs and Symptoms  Outcome: Ongoing, Progressing     Problem: Skin Injury Risk Increased  Goal: Skin Health and Integrity  Outcome: Ongoing, Progressing     Problem: Impaired Wound Healing  Goal: Optimal Wound Healing  Outcome: Ongoing, Progressing     Problem: Fluid and Electrolyte Imbalance (Acute Kidney Injury/Impairment)  Goal: Fluid and Electrolyte Balance  Outcome: Ongoing, Progressing     Problem: Oral Intake Inadequate (Acute Kidney Injury/Impairment)  Goal: Optimal Nutrition Intake  Outcome: Ongoing, Progressing     Problem: Renal Function Impairment (Acute Kidney Injury/Impairment)  Goal: Effective Renal Function  Outcome: Ongoing, Progressing

## 2023-10-11 NOTE — PT/OT/SLP PROGRESS
"Occupational Therapy   Treatment    Name: Saji Castañeda  MRN: [id number]  Admitting Diagnosis:  Bacteremia due to Gram-negative bacteria       Recommendations:     Discharge Recommendations: nursing facility, skilled  Discharge Equipment Recommendations:   (TBD)  Barriers to discharge:       Assessment:     Saji Castañeda is a 73 y.o. male with a medical diagnosis of Bacteremia due to Gram-negative bacteria.  He presents with the following performance deficits affecting function: weakness, impaired functional mobility, gait instability, impaired endurance, impaired self care skills, decreased lower extremity function, orthopedic precautions, decreased coordination.     Pt agreeable to therapy and tolerated session well. Pt requires light assistance with bed mobility. Pt practicing scooting well to simulate slide board transfers. Pt showing improvement in upper body strength and will work on slide board transfers to wheelchair in future.     Rehab Prognosis:  Good; patient would benefit from acute skilled OT services to address these deficits and reach maximum level of function.       Plan:     Patient to be seen 3 x/week to address the above listed problems via self-care/home management, therapeutic activities, therapeutic exercises, neuromuscular re-education  Plan of Care Expires: 11/04/23  Plan of Care Reviewed with: patient    Subjective     Chief Complaint: itchiness  Patient/Family Comments/goals: "WOOOO" when washing pt's back d/t itchiness  Pain/Comfort:  Pain Rating 1: 0/10  Pain Rating Post-Intervention 1: 0/10    Objective:     Communicated with: RN prior to session.  Patient found HOB elevated with peripheral IV, telemetry, Condom Catheter (swan condom catheter) upon OT entry to room.  Treatment supervised by SEBASTIEN Hughes.  A client care conference was completed by the OTR and the TAYLOR prior to treatment by the TAYLOR to discuss the patient's POC and current status.    General Precautions: " Standard, fall    Orthopedic Precautions:RLE partial weight bearing, LLE non weight bearing (RLE WBAT through heel with Darco)  Braces:  (darco shoe)  Respiratory Status: Room air     Occupational Performance:     Bed Mobility:    Patient completed Rolling/Turning to Right with stand by assistance  Patient completed Scooting/Bridging with stand by assistance (x4 scoots to L side using BUE and RLE)  Patient completed Supine to Sit with stand by assistance  Patient completed Sit to Supine with stand by assistance     Activities of Daily Living:  Grooming: supervision seated EOB for facial care  Bathing: stand by assistance to wash anterior upper body, therapist washing posterior      AMPAC 6 Click ADL: 17    Treatment & Education:  Pt educated on OT POC and frequency during hospital stay.     Pt educated on importance of OOB activity to improve function and activity tolerance.    Pt educated on proper techniques for scooting along EOB to improve transfers and abide by ortho precautions.     Addressed all patient questions/concerns within TAYLOR scope of practice.     Patient left HOB elevated with all lines intact, call button in reach, and RN notified    GOALS:   Multidisciplinary Problems       Occupational Therapy Goals          Problem: Occupational Therapy    Goal Priority Disciplines Outcome Interventions   Occupational Therapy Goal     OT, PT/OT Ongoing, Progressing    Description: Goals to be met by: 10-15-23     Patient will increase functional independence with ADLs by performing:    UE Dressing with Set-up Assistance.  LE Dressing with Supervision in supine  Grooming while seated with Set-up Assistance.  Toileting from drop arm commode with Minimal Assistance for hygiene and clothing management.   Supine to sit with Supervision.  Squat pivot transfers with Min A in darco shoe with WB through R heel only in darco  Toilet transfer to drop arm commode with Minimal Assistance.  Pt. To be I with HEP in BUE to  improve level of endurance                         Time Tracking:     OT Date of Treatment: 10/11/23  OT Start Time: 1022  OT Stop Time: 1047  OT Total Time (min): 25 min    Billable Minutes:Self Care/Home Management 12  Neuromuscular Re-education 13    OT/KATHLEEN: KATHLEEN     Number of KATHLEEN visits since last OT visit: 2    10/11/2023

## 2023-10-11 NOTE — CONSULTS
[facility] ([place] [id number])  Wound Care    Patient Name:  Saji Castañeda   MRN:  [id number]  Date: 10/11/2023  Diagnosis: Bacteremia due to Gram-negative bacteria    History:     Past Medical History:   Diagnosis Date    Arthritis     legs    Diabetes mellitus     Diabetes mellitus, type 2     Hyperlipidemia     Hypertension     Osteomyelitis     Palliative care encounter 5/24/2023       Social History     Socioeconomic History    Marital status: Single   Tobacco Use    Smoking status: Never    Smokeless tobacco: Never   Substance and Sexual Activity    Alcohol use: No     Comment: occassional    Drug use: No    Sexual activity: Not Currently   Social History Narrative    Not currently working; lives with family     Social Determinants of Health     Financial Resource Strain: Low Risk  (10/3/2023)    Overall Financial Resource Strain (CARDIA)     Difficulty of Paying Living Expenses: Not hard at all   Food Insecurity: No Food Insecurity (10/3/2023)    Hunger Vital Sign     Worried About Running Out of Food in the Last Year: Never true     Ran Out of Food in the Last Year: Never true   Transportation Needs: No Transportation Needs (10/3/2023)    PRAPARE - Transportation     Lack of Transportation (Medical): No     Lack of Transportation (Non-Medical): No   Physical Activity: Unknown (10/3/2023)    Exercise Vital Sign     Days of Exercise per Week: Patient refused     Minutes of Exercise per Session: Patient refused   Stress: Stress Concern Present (5/23/2023)    Citizen of [place] of Occupational Health - Occupational Stress Questionnaire     Feeling of Stress : Rather much   Social Connections: Moderately Isolated (10/3/2023)    Social Connection and Isolation Panel [NHANES]     Frequency of Communication with Friends and Family: More than three times a week     Frequency of Social Gatherings with Friends and Family: Patient refused     Attends Jew Services: More than 4 times per year     Active  Member of Clubs or Organizations: No     Attends Club or Organization Meetings: Never     Marital Status: Never    Housing Stability: Low Risk  (10/3/2023)    Housing Stability Vital Sign     Unable to Pay for Housing in the Last Year: No     Number of Places Lived in the Last Year: 1     Unstable Housing in the Last Year: No       Precautions:     Allergies as of 10/02/2023    (No Known Allergies)       WO Assessment Details/Treatment     Patient seen for wound care consultation for LE.    Reviewed chart for this encounter.   See Flow Sheet for findings.    Pt is awake/ alert- and agreeable to targeted assmt at this time. Able to position self / legs independently and maintain position with no to limited assistance. Continued local wound recs to all wounds with hydrofera for antibacterial property and absorption with border foam cover dressing for added absorption and protection from friction forces. Pt tolerated care without c/o pain- and expressed interest and able to voice basic understanding re wound assmt and care provided.- deferred true compression at this time due to dx of PAD- rec to keep LE elevated- ensuring heels off surface.    RECOMMENDATIONS:  Daily - R heel - cleanse gently w/ NS, pat dry. Apply betadine and allow to dry before applying foam dressing.    Q3 days/PRN saturation - L foot amputation - cleanse gently w/ NS, allow to dry. Apply Hydrofera blue (can order through [employer]) to wound bed and secure w/ foam dressing.    BID/PRN - groin/abd folds - cleanse gently w/ soap and water or baby wipes, pat dry. Apply Miconazole powder (see MAR) to groin and abd folds. Keep pt clean and dry.    BID/PRN - Buttocks - cleanse gently w/ soap and water, pat dry and apply Z-guard paste. No foam dressings or diapers    ** Apply Lac-Hydrin lotion to dry skin areas at both legs daily.    Discussed POC with patient. Dr Shelton contacted with wound assmt and recs.  See EMR for orders.    Nursing to continue  care & monitoring.  Nursing to maintain pressure injury prevention interventions.  Current documented Tomas score is 15 with a nutrition sub scale score of 3.      10/11/23 1115   WOCN Assessment   WOCN Total Time (mins) 45   Visit Date 10/11/23   Visit Time 1115   Consult Type Follow Up;New   WOCN Speciality Wound   Intervention assessed;changed;chart review;orders   Teaching on-going  (pt- wound assmt and recommendations for local wound/ skin care.)   Skin Interventions   Device Skin Pressure Protection absorbent pad utilized/changed   Pressure Reduction Devices positioning supports utilized   Pressure Reduction Techniques frequent weight shift encouraged   Skin Protection drying agents applied;skin sealant/moisture barrier applied   Positioning   Body Position position changed independently;legs elevated   Head of Bed (HOB) Positioning HOB elevated   Positioning/Transfer Devices pillows   Pressure Injury Prevention    Check Moisture Management Pad Done   Heel protection technique Heel boot   Heel preventative measures Replace   Check Medical Devices Done        Altered Skin Integrity 10/03/23 1900 Left anterior;posterior Foot #1 Other (comment) Full thickness tissue loss. Subcutaneous fat may be visible but bone, tendon or muscle are not exposed   Date First Assessed/Time First Assessed: 10/03/23 1900   Altered Skin Integrity Present on Admission - Did Patient arrive to the hospital with altered skin?: yes  Side: Left  Orientation: anterior;posterior  Location: Foot  Wound Number: #1  Is this i...   Wound Image    Dressing Appearance Moist drainage   Drainage Amount Small   Drainage Characteristics/Odor Sanguineous;Serosanguineous   Appearance Red   Tissue loss description Full thickness   Red (%), Wound Tissue Color 100 %   Periwound Area Macerated   Wound Length (cm) 4 cm   Wound Width (cm) 4 cm   Wound Depth (cm) 0.3 cm   Wound Volume (cm^3) 4.8 cm^3   Wound Surface Area (cm^2) 16 cm^2   Care Cleansed  with:;Wound cleanser   Dressing Removed;Changed;Methylene blue/gentian violet;Foam   Periwound Care Absorptive dressing applied   Dressing Change Due 10/11/23        Altered Skin Integrity 10/03/23 1900 Right posterior;midline Heel #2 Ulceration Full thickness tissue loss. Base is covered by slough and/or eschar in the wound bed   Date First Assessed/Time First Assessed: 10/03/23 1900   Altered Skin Integrity Present on Admission - Did Patient arrive to the hospital with altered skin?: (c) yes  Side: Right  Orientation: posterior;midline  Location: Heel  Wound Number: #2  Is th...   Wound Image     Dressing Appearance Dried drainage   Drainage Amount Scant   Drainage Characteristics/Odor No odor   Appearance Pink   Tissue loss description Partial thickness   Red (%), Wound Tissue Color 95 %   Periwound Area Other (see comments)  (dry epidermal sloughing.)   Care Cleansed with:;Wound cleanser   Dressing Removed;Changed   Periwound Care Dry periwound area maintained   Dressing Change Due 10/18/23     Photo taken for reference.    R anterior ankle     L calf      Will continue to follow as needed and/ or as directed until discharge.    Nora De La Torre BSN, RN, CWOCN  10/11/2023

## 2023-10-11 NOTE — SUBJECTIVE & OBJECTIVE
Interval History: ID susceptibilities pending. Telemetry reviewed, patient is in complete heart block with narrow escape rate 48.      Objective:     Vital Signs (Most Recent):  Temp: 98.6 °F (37 °C) (10/11/23 0759)  Pulse: (!) 48 (10/11/23 0759)  Resp: 17 (10/11/23 0759)  BP: (!) 146/68 (10/11/23 0759)  SpO2: 96 % (10/11/23 0759) Vital Signs (24h Range):  Temp:  [97.8 °F (36.6 °C)-98.6 °F (37 °C)] 98.6 °F (37 °C)  Pulse:  [34-50] 48  Resp:  [17-20] 17  SpO2:  [92 %-96 %] 96 %  BP: (124-176)/(56-72) 146/68     Weight: 112.5 kg (248 lb)  Body mass index is 36.62 kg/m².     SpO2: 96 %        Physical Exam  Vitals reviewed.   Constitutional:       Appearance: He is well-developed.   HENT:      Head: Normocephalic and atraumatic.   Eyes:      Conjunctiva/sclera: Conjunctivae normal.      Pupils: Pupils are equal, round, and reactive to light.   Neck:      Vascular: Normal carotid pulses. No carotid bruit or JVD.   Cardiovascular:      Rate and Rhythm: Regular rhythm. Bradycardia present.      Heart sounds: No murmur heard.     No friction rub. No gallop.   Pulmonary:      Effort: Pulmonary effort is normal. No respiratory distress.   Abdominal:      General: There is no distension.      Palpations: Abdomen is soft.      Tenderness: There is no abdominal tenderness.   Musculoskeletal:      Cervical back: Normal range of motion and neck supple.   Skin:     General: Skin is warm and dry.      Nails: There is no clubbing.   Neurological:      Mental Status: He is alert and oriented to person, place, and time.   Psychiatric:         Behavior: Behavior normal.            Significant Labs: All pertinent lab results from the last 24 hours have been reviewed.

## 2023-10-11 NOTE — ASSESSMENT & PLAN NOTE
73-year-old male with history of afib, T2DM, PVD s/p left foot amputation and chronic OM on IV abx (linezolid, cefepime, metronidzole) at NH presented with weakness found to have sepsis secondary to ESBL Klebsiella and  Acinetobacter bacteremia. likely source includes PICC line - also consider from left heel wound. PICC line removed 10/4/2023. Repeat blood cultures NGTD.     Cefidericol was added to blood culture report from 10/2, awaiting results of sensitivity report. Contacted micro lab today and stated sensitivy report would return in 7d. Wound culture + C. Albicans and achromobacter xylosoxidans.     To note, pt has followed with wound care who recommended ED visit in early September which pt declined. Ended up presenting to  ED on 9/13 with cf sepsis 2/2 bilateral BLE wounds. Pt not amendable for amputation. Was discharged with 6 weeks of linezolid, cefepime, and flagyl, EOC ~10/25/23.       Recommendations:  1. Continue cefidericol 1g IV q8hr (CrCl 18- renally dosed) and fluconazole 200mg qd  2. Will follow for cefidericol sensitivity report from outpatient. Pending from micro lab.   3. No further vascular surgical plans at this time. Stating pt has ability to heal wounds. Recommend to continue frequent wound care, off loading, and nutritional support.   4. Plan reviewed with ID staff. ID will follow.

## 2023-10-11 NOTE — PLAN OF CARE
Problem: Adult Inpatient Plan of Care  Goal: Plan of Care Review  Outcome: Ongoing, Progressing     Problem: Diabetes Comorbidity  Goal: Blood Glucose Level Within Targeted Range  Outcome: Ongoing, Not Progressing  Intervention: Monitor and Manage Glycemia  Flowsheets (Taken 10/10/2023 1902)  Glycemic Management: blood glucose monitored     Problem: Infection  Goal: Absence of Infection Signs and Symptoms  Outcome: Ongoing, Not Progressing  Intervention: Prevent or Manage Infection  Flowsheets (Taken 10/10/2023 1902)  Fever Reduction/Comfort Measures: lightweight clothing  Isolation Precautions:   contact   precautions maintained     Problem: Impaired Wound Healing  Goal: Optimal Wound Healing  Outcome: Ongoing, Not Progressing  Intervention: Promote Wound Healing  Flowsheets (Taken 10/10/2023 1902)  Activity Management:   Rolling - L1   Arm raise - L1  Pain Management Interventions: quiet environment facilitated

## 2023-10-11 NOTE — ASSESSMENT & PLAN NOTE
Plan for leadless pacemaker insertion tomorrow  NPO after midnight  Hold morning eliquis dose tomorrow

## 2023-10-12 ENCOUNTER — ANESTHESIA (OUTPATIENT)
Dept: MEDSURG UNIT | Facility: HOSPITAL | Age: 73
DRG: 853 | End: 2023-10-12
Payer: MEDICARE

## 2023-10-12 PROBLEM — E87.5 HYPERKALEMIA: Status: ACTIVE | Noted: 2023-10-12

## 2023-10-12 LAB
ALBUMIN SERPL BCP-MCNC: 2.2 G/DL (ref 3.5–5.2)
ANION GAP SERPL CALC-SCNC: 10 MMOL/L (ref 8–16)
ANION GAP SERPL CALC-SCNC: 7 MMOL/L (ref 8–16)
ANION GAP SERPL CALC-SCNC: 9 MMOL/L (ref 8–16)
BUN SERPL-MCNC: 55 MG/DL (ref 8–23)
BUN SERPL-MCNC: 56 MG/DL (ref 8–23)
BUN SERPL-MCNC: 57 MG/DL (ref 8–23)
CALCIUM SERPL-MCNC: 8.3 MG/DL (ref 8.7–10.5)
CALCIUM SERPL-MCNC: 8.5 MG/DL (ref 8.7–10.5)
CALCIUM SERPL-MCNC: 8.9 MG/DL (ref 8.7–10.5)
CHLORIDE SERPL-SCNC: 109 MMOL/L (ref 95–110)
CHLORIDE SERPL-SCNC: 109 MMOL/L (ref 95–110)
CHLORIDE SERPL-SCNC: 111 MMOL/L (ref 95–110)
CO2 SERPL-SCNC: 21 MMOL/L (ref 23–29)
CO2 SERPL-SCNC: 22 MMOL/L (ref 23–29)
CO2 SERPL-SCNC: 22 MMOL/L (ref 23–29)
CREAT SERPL-MCNC: 4 MG/DL (ref 0.5–1.4)
CREAT SERPL-MCNC: 4.1 MG/DL (ref 0.5–1.4)
CREAT SERPL-MCNC: 4.2 MG/DL (ref 0.5–1.4)
EST. GFR  (NO RACE VARIABLE): 14.1 ML/MIN/1.73 M^2
EST. GFR  (NO RACE VARIABLE): 14.5 ML/MIN/1.73 M^2
EST. GFR  (NO RACE VARIABLE): 15 ML/MIN/1.73 M^2
GLUCOSE SERPL-MCNC: 107 MG/DL (ref 70–110)
GLUCOSE SERPL-MCNC: 122 MG/DL (ref 70–110)
GLUCOSE SERPL-MCNC: 89 MG/DL (ref 70–110)
PHOSPHATE SERPL-MCNC: 3.9 MG/DL (ref 2.7–4.5)
POCT GLUCOSE: 110 MG/DL (ref 70–110)
POCT GLUCOSE: 113 MG/DL (ref 70–110)
POCT GLUCOSE: 174 MG/DL (ref 70–110)
POCT GLUCOSE: 91 MG/DL (ref 70–110)
POTASSIUM SERPL-SCNC: 5.3 MMOL/L (ref 3.5–5.1)
POTASSIUM SERPL-SCNC: 5.3 MMOL/L (ref 3.5–5.1)
POTASSIUM SERPL-SCNC: 5.5 MMOL/L (ref 3.5–5.1)
POTASSIUM SERPL-SCNC: 6.2 MMOL/L (ref 3.5–5.1)
SODIUM SERPL-SCNC: 138 MMOL/L (ref 136–145)
SODIUM SERPL-SCNC: 140 MMOL/L (ref 136–145)
SODIUM SERPL-SCNC: 142 MMOL/L (ref 136–145)

## 2023-10-12 PROCEDURE — 93005 ELECTROCARDIOGRAM TRACING: CPT

## 2023-10-12 PROCEDURE — 33274 PR TRANSCATH INSERT/RPL, PERM LEADLESS PACEMKR, RT VENTR W/IMG: ICD-10-PCS | Mod: ,,, | Performed by: STUDENT IN AN ORGANIZED HEALTH CARE EDUCATION/TRAINING PROGRAM

## 2023-10-12 PROCEDURE — 33274 TCAT INSJ/RPL PERM LDLS PM: CPT | Mod: ,,, | Performed by: STUDENT IN AN ORGANIZED HEALTH CARE EDUCATION/TRAINING PROGRAM

## 2023-10-12 PROCEDURE — D9220A PRA ANESTHESIA: Mod: ANES,,, | Performed by: ANESTHESIOLOGY

## 2023-10-12 PROCEDURE — D9220A PRA ANESTHESIA: Mod: CRNA,,, | Performed by: NURSE ANESTHETIST, CERTIFIED REGISTERED

## 2023-10-12 PROCEDURE — C1769 GUIDE WIRE: HCPCS | Performed by: STUDENT IN AN ORGANIZED HEALTH CARE EDUCATION/TRAINING PROGRAM

## 2023-10-12 PROCEDURE — 93010 EKG 12-LEAD: ICD-10-PCS | Mod: ,,, | Performed by: INTERNAL MEDICINE

## 2023-10-12 PROCEDURE — C1730 CATH, EP, 19 OR FEW ELECT: HCPCS | Performed by: STUDENT IN AN ORGANIZED HEALTH CARE EDUCATION/TRAINING PROGRAM

## 2023-10-12 PROCEDURE — 80069 RENAL FUNCTION PANEL: CPT | Performed by: HOSPITALIST

## 2023-10-12 PROCEDURE — 33274 TCAT INSJ/RPL PERM LDLS PM: CPT | Performed by: STUDENT IN AN ORGANIZED HEALTH CARE EDUCATION/TRAINING PROGRAM

## 2023-10-12 PROCEDURE — 63600175 PHARM REV CODE 636 W HCPCS

## 2023-10-12 PROCEDURE — 63600175 PHARM REV CODE 636 W HCPCS: Performed by: STUDENT IN AN ORGANIZED HEALTH CARE EDUCATION/TRAINING PROGRAM

## 2023-10-12 PROCEDURE — 94761 N-INVAS EAR/PLS OXIMETRY MLT: CPT

## 2023-10-12 PROCEDURE — 36415 COLL VENOUS BLD VENIPUNCTURE: CPT | Performed by: HOSPITALIST

## 2023-10-12 PROCEDURE — 25000003 PHARM REV CODE 250: Performed by: NURSE ANESTHETIST, CERTIFIED REGISTERED

## 2023-10-12 PROCEDURE — 21400001 HC TELEMETRY ROOM

## 2023-10-12 PROCEDURE — D9220A PRA ANESTHESIA: ICD-10-PCS | Mod: ANES,,, | Performed by: ANESTHESIOLOGY

## 2023-10-12 PROCEDURE — 63600175 PHARM REV CODE 636 W HCPCS: Performed by: NURSE ANESTHETIST, CERTIFIED REGISTERED

## 2023-10-12 PROCEDURE — 99233 SBSQ HOSP IP/OBS HIGH 50: CPT | Mod: ,,, | Performed by: STUDENT IN AN ORGANIZED HEALTH CARE EDUCATION/TRAINING PROGRAM

## 2023-10-12 PROCEDURE — 93010 ELECTROCARDIOGRAM REPORT: CPT | Mod: ,,, | Performed by: INTERNAL MEDICINE

## 2023-10-12 PROCEDURE — 80048 BASIC METABOLIC PNL TOTAL CA: CPT | Performed by: STUDENT IN AN ORGANIZED HEALTH CARE EDUCATION/TRAINING PROGRAM

## 2023-10-12 PROCEDURE — 99233 PR SUBSEQUENT HOSPITAL CARE,LEVL III: ICD-10-PCS | Mod: ,,, | Performed by: STUDENT IN AN ORGANIZED HEALTH CARE EDUCATION/TRAINING PROGRAM

## 2023-10-12 PROCEDURE — 27000207 HC ISOLATION

## 2023-10-12 PROCEDURE — 25000003 PHARM REV CODE 250

## 2023-10-12 PROCEDURE — 63600175 PHARM REV CODE 636 W HCPCS: Mod: JZ,TB | Performed by: PHYSICIAN ASSISTANT

## 2023-10-12 PROCEDURE — 99232 SBSQ HOSP IP/OBS MODERATE 35: CPT | Mod: ,,, | Performed by: INTERNAL MEDICINE

## 2023-10-12 PROCEDURE — D9220A PRA ANESTHESIA: ICD-10-PCS | Mod: CRNA,,, | Performed by: NURSE ANESTHETIST, CERTIFIED REGISTERED

## 2023-10-12 PROCEDURE — 25000003 PHARM REV CODE 250: Performed by: PHYSICIAN ASSISTANT

## 2023-10-12 PROCEDURE — 82962 GLUCOSE BLOOD TEST: CPT | Performed by: STUDENT IN AN ORGANIZED HEALTH CARE EDUCATION/TRAINING PROGRAM

## 2023-10-12 PROCEDURE — 25500020 PHARM REV CODE 255: Performed by: STUDENT IN AN ORGANIZED HEALTH CARE EDUCATION/TRAINING PROGRAM

## 2023-10-12 PROCEDURE — 80048 BASIC METABOLIC PNL TOTAL CA: CPT | Mod: 91 | Performed by: STUDENT IN AN ORGANIZED HEALTH CARE EDUCATION/TRAINING PROGRAM

## 2023-10-12 PROCEDURE — 36415 COLL VENOUS BLD VENIPUNCTURE: CPT | Performed by: STUDENT IN AN ORGANIZED HEALTH CARE EDUCATION/TRAINING PROGRAM

## 2023-10-12 PROCEDURE — 99232 PR SUBSEQUENT HOSPITAL CARE,LEVL II: ICD-10-PCS | Mod: ,,, | Performed by: INTERNAL MEDICINE

## 2023-10-12 PROCEDURE — C1786 PMKR, SINGLE, RATE-RESP: HCPCS | Performed by: STUDENT IN AN ORGANIZED HEALTH CARE EDUCATION/TRAINING PROGRAM

## 2023-10-12 PROCEDURE — 37000009 HC ANESTHESIA EA ADD 15 MINS: Performed by: STUDENT IN AN ORGANIZED HEALTH CARE EDUCATION/TRAINING PROGRAM

## 2023-10-12 PROCEDURE — 25000003 PHARM REV CODE 250: Performed by: STUDENT IN AN ORGANIZED HEALTH CARE EDUCATION/TRAINING PROGRAM

## 2023-10-12 PROCEDURE — 25000003 PHARM REV CODE 250: Performed by: INTERNAL MEDICINE

## 2023-10-12 PROCEDURE — 63600175 PHARM REV CODE 636 W HCPCS: Performed by: ANESTHESIOLOGY

## 2023-10-12 PROCEDURE — C1894 INTRO/SHEATH, NON-LASER: HCPCS | Performed by: STUDENT IN AN ORGANIZED HEALTH CARE EDUCATION/TRAINING PROGRAM

## 2023-10-12 PROCEDURE — 37000008 HC ANESTHESIA 1ST 15 MINUTES: Performed by: STUDENT IN AN ORGANIZED HEALTH CARE EDUCATION/TRAINING PROGRAM

## 2023-10-12 PROCEDURE — 25000003 PHARM REV CODE 250: Performed by: HOSPITALIST

## 2023-10-12 DEVICE — TPS MC1AVR1 MICRA AV US H3
Type: IMPLANTABLE DEVICE | Site: HEART | Status: FUNCTIONAL
Brand: MICRA™ AV

## 2023-10-12 RX ORDER — HYDROMORPHONE HYDROCHLORIDE 1 MG/ML
INJECTION, SOLUTION INTRAMUSCULAR; INTRAVENOUS; SUBCUTANEOUS
Status: COMPLETED
Start: 2023-10-12 | End: 2023-10-12

## 2023-10-12 RX ORDER — FUROSEMIDE 10 MG/ML
60 INJECTION INTRAMUSCULAR; INTRAVENOUS ONCE
Status: DISCONTINUED | OUTPATIENT
Start: 2023-10-12 | End: 2023-10-12

## 2023-10-12 RX ORDER — HEPARIN SOD,PORCINE/0.9 % NACL 1000/500ML
INTRAVENOUS SOLUTION INTRAVENOUS
Status: DISCONTINUED | OUTPATIENT
Start: 2023-10-12 | End: 2023-10-12 | Stop reason: HOSPADM

## 2023-10-12 RX ORDER — ACETAMINOPHEN 325 MG/1
650 TABLET ORAL EVERY 4 HOURS PRN
Status: DISCONTINUED | OUTPATIENT
Start: 2023-10-12 | End: 2023-10-19 | Stop reason: HOSPADM

## 2023-10-12 RX ORDER — KETAMINE HCL IN 0.9 % NACL 50 MG/5 ML
SYRINGE (ML) INTRAVENOUS
Status: DISCONTINUED | OUTPATIENT
Start: 2023-10-12 | End: 2023-10-12

## 2023-10-12 RX ORDER — FENTANYL CITRATE 50 UG/ML
INJECTION, SOLUTION INTRAMUSCULAR; INTRAVENOUS
Status: DISCONTINUED | OUTPATIENT
Start: 2023-10-12 | End: 2023-10-12

## 2023-10-12 RX ORDER — DEXMEDETOMIDINE HYDROCHLORIDE 100 UG/ML
INJECTION, SOLUTION INTRAVENOUS
Status: DISCONTINUED | OUTPATIENT
Start: 2023-10-12 | End: 2023-10-12

## 2023-10-12 RX ORDER — PROPOFOL 10 MG/ML
VIAL (ML) INTRAVENOUS CONTINUOUS PRN
Status: DISCONTINUED | OUTPATIENT
Start: 2023-10-12 | End: 2023-10-12

## 2023-10-12 RX ORDER — IODIXANOL 320 MG/ML
INJECTION, SOLUTION INTRAVASCULAR
Status: DISCONTINUED | OUTPATIENT
Start: 2023-10-12 | End: 2023-10-12 | Stop reason: HOSPADM

## 2023-10-12 RX ORDER — HYDRALAZINE HYDROCHLORIDE 20 MG/ML
10 INJECTION INTRAMUSCULAR; INTRAVENOUS
Status: DISCONTINUED | OUTPATIENT
Start: 2023-10-12 | End: 2023-10-12 | Stop reason: HOSPADM

## 2023-10-12 RX ORDER — LIDOCAINE HYDROCHLORIDE 20 MG/ML
INJECTION, SOLUTION INFILTRATION; PERINEURAL
Status: DISCONTINUED | OUTPATIENT
Start: 2023-10-12 | End: 2023-10-12 | Stop reason: HOSPADM

## 2023-10-12 RX ORDER — HYDROMORPHONE HYDROCHLORIDE 1 MG/ML
0.2 INJECTION, SOLUTION INTRAMUSCULAR; INTRAVENOUS; SUBCUTANEOUS EVERY 5 MIN PRN
Status: DISCONTINUED | OUTPATIENT
Start: 2023-10-12 | End: 2023-10-12 | Stop reason: HOSPADM

## 2023-10-12 RX ORDER — HEPARIN SODIUM 1000 [USP'U]/ML
INJECTION, SOLUTION INTRAVENOUS; SUBCUTANEOUS
Status: DISCONTINUED | OUTPATIENT
Start: 2023-10-12 | End: 2023-10-12

## 2023-10-12 RX ORDER — MIDAZOLAM HYDROCHLORIDE 1 MG/ML
INJECTION, SOLUTION INTRAMUSCULAR; INTRAVENOUS
Status: DISCONTINUED | OUTPATIENT
Start: 2023-10-12 | End: 2023-10-12

## 2023-10-12 RX ADMIN — FLUCONAZOLE 200 MG: 200 TABLET ORAL at 09:10

## 2023-10-12 RX ADMIN — THERA TABS 1 TABLET: TAB at 09:10

## 2023-10-12 RX ADMIN — PROPOFOL 100 MCG/KG/MIN: 10 INJECTION, EMULSION INTRAVENOUS at 11:10

## 2023-10-12 RX ADMIN — ATORVASTATIN CALCIUM 80 MG: 40 TABLET, FILM COATED ORAL at 08:10

## 2023-10-12 RX ADMIN — HYDROMORPHONE HYDROCHLORIDE 0.2 MG: 1 INJECTION, SOLUTION INTRAMUSCULAR; INTRAVENOUS; SUBCUTANEOUS at 02:10

## 2023-10-12 RX ADMIN — ACETAMINOPHEN 650 MG: 325 TABLET ORAL at 10:10

## 2023-10-12 RX ADMIN — MICONAZOLE NITRATE: 20 POWDER TOPICAL at 08:10

## 2023-10-12 RX ADMIN — SODIUM CHLORIDE: 0.9 INJECTION, SOLUTION INTRAVENOUS at 11:10

## 2023-10-12 RX ADMIN — CEFIDEROCOL SULFATE TOSYLATE 1 G: 1 INJECTION, POWDER, FOR SOLUTION INTRAVENOUS at 09:10

## 2023-10-12 RX ADMIN — HYDROMORPHONE HYDROCHLORIDE 0.2 MG: 1 INJECTION, SOLUTION INTRAMUSCULAR; INTRAVENOUS; SUBCUTANEOUS at 03:10

## 2023-10-12 RX ADMIN — HYDRALAZINE HYDROCHLORIDE 100 MG: 50 TABLET ORAL at 02:10

## 2023-10-12 RX ADMIN — OXYCODONE HYDROCHLORIDE AND ACETAMINOPHEN 500 MG: 500 TABLET ORAL at 08:10

## 2023-10-12 RX ADMIN — SODIUM ZIRCONIUM CYCLOSILICATE 10 G: 10 POWDER, FOR SUSPENSION ORAL at 09:10

## 2023-10-12 RX ADMIN — GLYCOPYRROLATE 0.2 MG: 0.2 INJECTION INTRAMUSCULAR; INTRAVENOUS at 11:10

## 2023-10-12 RX ADMIN — FENTANYL CITRATE 100 MCG: 50 INJECTION INTRAMUSCULAR; INTRAVENOUS at 01:10

## 2023-10-12 RX ADMIN — CEFIDEROCOL SULFATE TOSYLATE 1 G: 1 INJECTION, POWDER, FOR SOLUTION INTRAVENOUS at 03:10

## 2023-10-12 RX ADMIN — HEPARIN SODIUM 3000 UNITS: 1000 INJECTION, SOLUTION INTRAVENOUS; SUBCUTANEOUS at 12:10

## 2023-10-12 RX ADMIN — CALCIUM GLUCONATE 1 G: 98 INJECTION, SOLUTION INTRAVENOUS at 08:10

## 2023-10-12 RX ADMIN — MICONAZOLE NITRATE: 20 POWDER TOPICAL at 09:10

## 2023-10-12 RX ADMIN — MIDAZOLAM 2 MG: 1 INJECTION INTRAMUSCULAR; INTRAVENOUS at 11:10

## 2023-10-12 RX ADMIN — CEFIDEROCOL SULFATE TOSYLATE 1 G: 1 INJECTION, POWDER, FOR SOLUTION INTRAVENOUS at 06:10

## 2023-10-12 RX ADMIN — HYDRALAZINE HYDROCHLORIDE 100 MG: 50 TABLET ORAL at 05:10

## 2023-10-12 RX ADMIN — ACETAMINOPHEN 650 MG: 325 TABLET ORAL at 05:10

## 2023-10-12 RX ADMIN — HYDRALAZINE HYDROCHLORIDE 100 MG: 50 TABLET ORAL at 08:10

## 2023-10-12 RX ADMIN — GABAPENTIN 100 MG: 300 CAPSULE ORAL at 08:10

## 2023-10-12 RX ADMIN — Medication 20 MG: at 11:10

## 2023-10-12 RX ADMIN — TAMSULOSIN HYDROCHLORIDE 0.4 MG: 0.4 CAPSULE ORAL at 09:10

## 2023-10-12 RX ADMIN — OXYCODONE HYDROCHLORIDE AND ACETAMINOPHEN 500 MG: 500 TABLET ORAL at 09:10

## 2023-10-12 RX ADMIN — Medication 10 MG: at 12:10

## 2023-10-12 RX ADMIN — HYDRALAZINE HYDROCHLORIDE 10 MG: 20 INJECTION INTRAMUSCULAR; INTRAVENOUS at 04:10

## 2023-10-12 RX ADMIN — DEXMEDETOMIDINE 16 MCG: 100 INJECTION, SOLUTION, CONCENTRATE INTRAVENOUS at 01:10

## 2023-10-12 RX ADMIN — Medication 10 MG: at 11:10

## 2023-10-12 RX ADMIN — ACETAMINOPHEN 650 MG: 325 TABLET ORAL at 06:10

## 2023-10-12 RX ADMIN — PANTOPRAZOLE SODIUM 40 MG: 40 TABLET, DELAYED RELEASE ORAL at 09:10

## 2023-10-12 RX ADMIN — GABAPENTIN 100 MG: 300 CAPSULE ORAL at 09:10

## 2023-10-12 NOTE — NURSING TRANSFER
Nursing Transfer Note      10/12/2023   5:39 PM    Nurse giving handoff: BEREKET Gómez PACU  Nurse receiving handoff: BEREKET Mansfield 16W    Reason patient is being transferred: post anesthesia     Transfer From: PACU to [facility]    Transfer via bed    Transfer with cardiac monitoring    Transported by PCT x 2    Telemetry: Box Number [id number]  Order for Tele Monitor? Yes    Additional Lines: n/a    4eyes on Skin: yes    Medicines sent: cefiderocol bag sent with patient    Any special needs or follow-up needed: bedrest up @ 1817    Patient belongings transferred with patient:  [employer]  equipment     Chart send with patient: Yes    Notified: sister    Patient reassessed at: 10/12/23 @ 1241

## 2023-10-12 NOTE — PROGRESS NOTES
[facility] ([place] [address])  Nephrology  Progress Note    Patient Name: Saji Castañeda  MRN: [id number]  Admission Date: 10/2/2023  Hospital Length of Stay: 10 days  Attending Provider: Yakelin Ying MD   Primary Care Physician: [facility] -  Principal Problem:Bacteremia due to Gram-negative bacteria    Subjective:     HPI: 73 year old Black man with hypertension, hyperlipidemia, diabetes mellitus type 2 with neuropathy, left ventricular concentric hypertrophy, paroxysmal atrial fibrillation (anticoagulated on apixaban), Mobitz type I atrioventricular heart block, chronic kidney disease stage 3, chronic venous hypertension, peripheral vascular disease, 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 Patient presnted to [facility] with complaints of fatigue. He was found to be in complete heart block and hypotensive, Additionally noted to have acute osteomyelitis of the posterior aspect of the calcaneus. He had hypotension concerning for septic shock. He was started on dobutamine, vancomycin, and piperacillin-tazobactam. Blood culture grew Acinetobacter baumannii and Klebsiella pneumoniae. Hosptial course complicated by YNES, for which nephrology was consulted for.       Interval History:     No acute events overnight. Patient scheduled for pacemaker placement today. Denies acute complaints.     Scr 4.3-->4.1  K 5.5      Review of patient's allergies indicates:  No Known Allergies  Current Facility-Administered Medications   Medication Frequency    acetaminophen tablet 650 mg Q6H    ammonium lactate 12 % lotion BID PRN    [START ON 10/13/2023] apixaban tablet 5 mg BID    ascorbic acid (vitamin C) tablet 500 mg BID    atorvastatin tablet 80 mg QHS    bisacodyL EC tablet 5 mg Daily PRN     cefiderocoL 1 g in dextrose 5 % (D5W) 100 mL infusion Q8H    [START ON 10/13/2023] clopidogreL tablet 75 mg Daily    fluconazole tablet 200 mg Daily    gabapentin capsule 100 mg BID    glucagon (human recombinant) injection 1 mg PRN    glucose chewable tablet 16 g PRN    glucose chewable tablet 24 g PRN    hydrALAZINE tablet 100 mg Q8H    HYDROmorphone injection 0.5 mg Q6H PRN    insulin aspart U-100 pen 0-5 Units QID (AC + HS) PRN    insulin aspart U-100 pen 5 Units TIDWM    insulin detemir U-100 (Levemir) pen 16 Units QHS    miconazole NITRATE 2 % top powder BID    multivitamin tablet Daily    pantoprazole EC tablet 40 mg Daily    sodium chloride 0.9% flush 10 mL PRN    sodium zirconium cyclosilicate packet 10 g Once    tamsulosin 24 hr capsule 0.4 mg Daily       Objective:     Vital Signs (Most Recent):  Temp: 98.2 °F (36.8 °C) (10/12/23 0826)  Pulse: (!) 48 (10/12/23 0826)  Resp: 18 (10/12/23 0826)  BP: (!) 171/74 (10/12/23 0826)  SpO2: 96 % (10/12/23 0826) Vital Signs (24h Range):  Temp:  [98 °F (36.7 °C)-98.6 °F (37 °C)] 98.2 °F (36.8 °C)  Pulse:  [38-48] 48  Resp:  [18-20] 18  SpO2:  [91 %-96 %] 96 %  BP: (159-187)/(70-82) 171/74     Weight: 112.5 kg (248 lb) (10/03/23 1006)  Body mass index is 36.62 kg/m².  Body surface area is 2.34 meters squared.    I/O last 3 completed shifts:  In: 400 [P.O.:400]  Out: 700 [Urine:700]     Physical Exam  Vitals and nursing note reviewed.   Constitutional:       General: He is not in acute distress.     Appearance: Normal appearance. He is well-developed. He is not ill-appearing or diaphoretic.   HENT:      Head: Normocephalic and atraumatic.   Eyes:      Pupils: Pupils are equal, round, and reactive to light.   Cardiovascular:      Rate and Rhythm: Normal rate and regular rhythm.      Heart sounds: Normal heart sounds. No murmur heard.  Pulmonary:      Effort: Pulmonary effort is normal. No respiratory distress.      Breath sounds: Normal breath sounds. No stridor.  "  Abdominal:      General: Bowel sounds are normal. There is no distension.   Musculoskeletal:         General: No deformity. Normal range of motion.      Cervical back: Normal range of motion and neck supple.   Skin:     General: Skin is warm and dry.      Findings: Lesion present. No erythema or rash.      Comments: RIJ line in place c/d/I  Picc line removed    Bilateral heel wounds dressed. Refer to media    Neurological:      Mental Status: He is alert and oriented to person, place, and time.   Psychiatric:         Mood and Affect: Mood normal.         Behavior: Behavior normal.         Thought Content: Thought content normal.         Judgment: Judgment normal.          Significant Labs:  All labs within the past 24 hours have been reviewed.     Significant Imaging:  Labs: Reviewed    Assessment/Plan:     Cardiac/Vascular  History of complete heart block  Per primary team.     Peripheral arterial disease  Per primary team.     Renal/  Acute on chronic renal failure  Non Oliguric YNES on baseline CKD III with sub nephrotic range protienuira (1.15g).   - baselien Scr ~1-1.4; Scr at time of consultation ~4.5  - UA with 2+ protein; Trace blood; 2 RBC.   - Renal US "The right kidney measures 12.1 cm.; The left kidney measures 12.8 cm.; MRD"   - 10/11/23 Urine Micro "Bacteria noted, no overt casts, some granular sediment"  - Suspect YNES from ischemic ATN from hypoperfusion in setting of shock (on dobutamine) and symptomatic bradycardia. Additional insult from Sepsis (bacteremia) and Vancomycin toxicity (trough ~27 on 10/4).   - Scr 4.5-->4.3-->4.1 today.   Plan:  - Give Normal Saline 1L @ 100cc/hr over 10 hours.   - Avoid drastic changes in BP  - strict ins and outs   - Renally dose all medications  - avoid nephrotoxic agents    ID  Chronic osteomyelitis  Per primary team.     Orthopedic  Nonhealing ulcer of right lower leg with fat layer exposed  Per primary team.         Thank you for your consult. I will follow-up " with patient. Please contact us if you have any additional questions.     Case discussed with attending. Attestation to follow.       Terrell Mcdonnell DO  Nephrology  [facility] ([place] [address])    ATTENDING PHYSICIAN ATTESTATION  I have personally verified the history and examined the patient. I thoroughly reviewed the demographic, clinical, laboratorial and imaging information available in medical records. I agree with the assessment and recommendations provided by the subspecialty resident who was under my supervision.

## 2023-10-12 NOTE — SUBJECTIVE & OBJECTIVE
Interval History:     No acute events overnight. Patient scheduled for pacemaker placement today. Denies acute complaints.     Scr 4.3-->4.1  K 5.5      Review of patient's allergies indicates:  No Known Allergies  Current Facility-Administered Medications   Medication Frequency    acetaminophen tablet 650 mg Q6H    ammonium lactate 12 % lotion BID PRN    [START ON 10/13/2023] apixaban tablet 5 mg BID    ascorbic acid (vitamin C) tablet 500 mg BID    atorvastatin tablet 80 mg QHS    bisacodyL EC tablet 5 mg Daily PRN    cefiderocoL 1 g in dextrose 5 % (D5W) 100 mL infusion Q8H    [START ON 10/13/2023] clopidogreL tablet 75 mg Daily    fluconazole tablet 200 mg Daily    gabapentin capsule 100 mg BID    glucagon (human recombinant) injection 1 mg PRN    glucose chewable tablet 16 g PRN    glucose chewable tablet 24 g PRN    hydrALAZINE tablet 100 mg Q8H    HYDROmorphone injection 0.5 mg Q6H PRN    insulin aspart U-100 pen 0-5 Units QID (AC + HS) PRN    insulin aspart U-100 pen 5 Units TIDWM    insulin detemir U-100 (Levemir) pen 16 Units QHS    miconazole NITRATE 2 % top powder BID    multivitamin tablet Daily    pantoprazole EC tablet 40 mg Daily    sodium chloride 0.9% flush 10 mL PRN    sodium zirconium cyclosilicate packet 10 g Once    tamsulosin 24 hr capsule 0.4 mg Daily       Objective:     Vital Signs (Most Recent):  Temp: 98.2 °F (36.8 °C) (10/12/23 0826)  Pulse: (!) 48 (10/12/23 0826)  Resp: 18 (10/12/23 0826)  BP: (!) 171/74 (10/12/23 0826)  SpO2: 96 % (10/12/23 0826) Vital Signs (24h Range):  Temp:  [98 °F (36.7 °C)-98.6 °F (37 °C)] 98.2 °F (36.8 °C)  Pulse:  [38-48] 48  Resp:  [18-20] 18  SpO2:  [91 %-96 %] 96 %  BP: (159-187)/(70-82) 171/74     Weight: 112.5 kg (248 lb) (10/03/23 1006)  Body mass index is 36.62 kg/m².  Body surface area is 2.34 meters squared.    I/O last 3 completed shifts:  In: 400 [P.O.:400]  Out: 700 [Urine:700]     Physical Exam  Vitals and nursing note reviewed.   Constitutional:        General: He is not in acute distress.     Appearance: Normal appearance. He is well-developed. He is not ill-appearing or diaphoretic.   HENT:      Head: Normocephalic and atraumatic.   Eyes:      Pupils: Pupils are equal, round, and reactive to light.   Cardiovascular:      Rate and Rhythm: Normal rate and regular rhythm.      Heart sounds: Normal heart sounds. No murmur heard.  Pulmonary:      Effort: Pulmonary effort is normal. No respiratory distress.      Breath sounds: Normal breath sounds. No stridor.   Abdominal:      General: Bowel sounds are normal. There is no distension.   Musculoskeletal:         General: No deformity. Normal range of motion.      Cervical back: Normal range of motion and neck supple.   Skin:     General: Skin is warm and dry.      Findings: Lesion present. No erythema or rash.      Comments: RIJ line in place c/d/I  Picc line removed    Bilateral heel wounds dressed. Refer to media    Neurological:      Mental Status: He is alert and oriented to person, place, and time.   Psychiatric:         Mood and Affect: Mood normal.         Behavior: Behavior normal.         Thought Content: Thought content normal.         Judgment: Judgment normal.          Significant Labs:  All labs within the past 24 hours have been reviewed.     Significant Imaging:  Labs: Reviewed

## 2023-10-12 NOTE — PLAN OF CARE
Recommendations     1.) Recommend if and when medically feasible to ADAT, with goal of Diabetic diet, texture per SLP- add renal modifiers as needed.      2.) If and when medically feasible, recommend providing Boost Glucose Control TID to help optimize PRO/Kcal intake.      3.) Recommend Kvng BID to help aid in wound healing.      4.) Recommend continuing with MVI and VitC to aid in wound healing- consider adding zinc x 14days.      5.) Re-consult if alternative nutrition is preferred.      6.) RD to monitor wt, PO intake, skin, labs- RD to perform NFPE next f/u if appropriate.        Goals: to meet % of EEN/EPN by next RD f/u  Nutrition Goal Status: new  Communication of RD Recs:  (POC)

## 2023-10-12 NOTE — PT/OT/SLP PROGRESS
Speech Language Pathology      Saji Castañeda  MRN: [id number]    Patient not seen today secondary to  (transport preparing to transport pt for pacemaker placement.). Will follow-up according to POC.

## 2023-10-12 NOTE — NURSING
AAOx4. Returned to 16w unit. Drsg to right groin, cdi. Bed rest with hob lowered as post procedure protocol. No complaints vocied.

## 2023-10-12 NOTE — PLAN OF CARE
Problem: Adult Inpatient Plan of Care  Goal: Plan of Care Review  Outcome: Ongoing, Not Progressing  Goal: Optimal Comfort and Wellbeing  Outcome: Ongoing, Not Progressing     Problem: Infection  Goal: Absence of Infection Signs and Symptoms  Outcome: Ongoing, Not Progressing     AAOx4. Hr currently 62. Denies discomfort at present time.  Drsg to right groin , cdi. [employer] supplies at bedside.

## 2023-10-12 NOTE — SUBJECTIVE & OBJECTIVE
Interval History: NAEO. Patient is planned for leadless pacemaker today.    Objective:     Vital Signs (Most Recent):  Temp: 98.2 °F (36.8 °C) (10/12/23 0826)  Pulse: (!) 57 (10/12/23 1045)  Resp: 18 (10/12/23 0826)  BP: (!) 171/74 (10/12/23 0826)  SpO2: 96 % (10/12/23 0826) Vital Signs (24h Range):  Temp:  [98 °F (36.7 °C)-98.6 °F (37 °C)] 98.2 °F (36.8 °C)  Pulse:  [38-57] 57  Resp:  [18-20] 18  SpO2:  [91 %-96 %] 96 %  BP: (165-187)/(73-82) 171/74     Weight: 112.5 kg (248 lb)  Body mass index is 36.62 kg/m².     SpO2: 96 %        Physical Exam  Vitals reviewed.   Constitutional:       Appearance: He is well-developed.   HENT:      Head: Normocephalic and atraumatic.   Eyes:      Conjunctiva/sclera: Conjunctivae normal.      Pupils: Pupils are equal, round, and reactive to light.   Neck:      Vascular: Normal carotid pulses. No carotid bruit or JVD.   Cardiovascular:      Rate and Rhythm: Regular rhythm. Bradycardia present.      Heart sounds: No murmur heard.     No friction rub. No gallop.   Pulmonary:      Effort: Pulmonary effort is normal. No respiratory distress.   Abdominal:      General: There is no distension.      Palpations: Abdomen is soft.      Tenderness: There is no abdominal tenderness.   Musculoskeletal:      Cervical back: Normal range of motion and neck supple.   Skin:     General: Skin is warm and dry.      Nails: There is no clubbing.   Neurological:      Mental Status: He is alert and oriented to person, place, and time.   Psychiatric:         Behavior: Behavior normal.            Significant Labs: All pertinent lab results from the last 24 hours have been reviewed.

## 2023-10-12 NOTE — PT/OT/SLP PROGRESS
Occupational Therapy      Patient Name:  Saji Castañeda   MRN:  [id number]    Patient not seen today secondary to Off the floor for procedure/surgery. Will follow-up next service date if appropriate.    10/12/2023

## 2023-10-12 NOTE — PROGRESS NOTES
[facility]  Cardiac Electrophysiology  Progress Note    Admission Date: 10/2/2023  Code Status: Full Code   Attending Physician: Yakelin Ying MD   Expected Discharge Date: 10/15/2023  Principal Problem:Bacteremia due to Gram-negative bacteria    Subjective:     Interval History: NAEO. Patient is planned for leadless pacemaker today.    Objective:     Vital Signs (Most Recent):  Temp: 98.2 °F (36.8 °C) (10/12/23 0826)  Pulse: (!) 57 (10/12/23 1045)  Resp: 18 (10/12/23 0826)  BP: (!) 171/74 (10/12/23 0826)  SpO2: 96 % (10/12/23 0826) Vital Signs (24h Range):  Temp:  [98 °F (36.7 °C)-98.6 °F (37 °C)] 98.2 °F (36.8 °C)  Pulse:  [38-57] 57  Resp:  [18-20] 18  SpO2:  [91 %-96 %] 96 %  BP: (165-187)/(73-82) 171/74     Weight: 112.5 kg (248 lb)  Body mass index is 36.62 kg/m².     SpO2: 96 %        Physical Exam  Vitals reviewed.   Constitutional:       Appearance: He is well-developed.   HENT:      Head: Normocephalic and atraumatic.   Eyes:      Conjunctiva/sclera: Conjunctivae normal.      Pupils: Pupils are equal, round, and reactive to light.   Neck:      Vascular: Normal carotid pulses. No carotid bruit or JVD.   Cardiovascular:      Rate and Rhythm: Regular rhythm. Bradycardia present.      Heart sounds: No murmur heard.     No friction rub. No gallop.   Pulmonary:      Effort: Pulmonary effort is normal. No respiratory distress.   Abdominal:      General: There is no distension.      Palpations: Abdomen is soft.      Tenderness: There is no abdominal tenderness.   Musculoskeletal:      Cervical back: Normal range of motion and neck supple.   Skin:     General: Skin is warm and dry.      Nails: There is no clubbing.   Neurological:      Mental Status: He is alert and oriented to person, place, and time.   Psychiatric:         Behavior: Behavior normal.            Significant Labs: All pertinent lab results from the last 24 hours have been reviewed.        Assessment and Plan:     History of  complete heart block  Plan for leadless pacemaker insertion today        Mason Lawson MD  Cardiac Electrophysiology  [facility] - Cardiology

## 2023-10-12 NOTE — PLAN OF CARE
Problem: Adult Inpatient Plan of Care  Goal: Plan of Care Review  Outcome: Ongoing, Progressing     Problem: Diabetes Comorbidity  Goal: Blood Glucose Level Within Targeted Range  Outcome: Ongoing, Progressing     Problem: Infection  Goal: Absence of Infection Signs and Symptoms  Outcome: Ongoing, Progressing     Problem: Impaired Wound Healing  Goal: Optimal Wound Healing  Outcome: Ongoing, Progressing     Problem: Renal Function Impairment (Acute Kidney Injury/Impairment)  Goal: Effective Renal Function  Outcome: Ongoing, Progressing

## 2023-10-12 NOTE — TRANSFER OF CARE
"Anesthesia Transfer of Care Note    Patient: Saji Castañeda    Procedure(s) Performed: Procedure(s) (LRB):  [id number] (N/A)    Patient location: PACU    Anesthesia Type: general    Transport from OR: Transported from OR on 6-10 L/min O2 by face mask with adequate spontaneous ventilation    Post pain: adequate analgesia    Post assessment: no apparent anesthetic complications    Post vital signs: stable    Level of consciousness: awake    Nausea/Vomiting: no nausea/vomiting    Complications: none    Transfer of care protocol was followed      Last vitals:   Visit Vitals  BP (!) 171/74 (BP Location: Right arm, Patient Position: Lying)   Pulse (!) 57   Temp 36.8 °C (98.2 °F) (Oral)   Resp 18   Ht 5' 9" (1.753 m)   Wt 112.5 kg (248 lb)   SpO2 96%   BMI 36.62 kg/m²     "

## 2023-10-12 NOTE — PROGRESS NOTES
[facility]  Adult Nutrition  Progress Note    SUMMARY       Recommendations    1.) Recommend if and when medically feasible to ADAT, with goal of Diabetic diet, texture per SLP- add renal modifiers as needed.     2.) If and when medically feasible, recommend providing Boost Glucose Control TID to help optimize PRO/Kcal intake.     3.) Recommend Kvng BID to help aid in wound healing.     4.) Recommend continuing with MVI and VitC to aid in wound healing- consider adding zinc x 14days.     5.) Re-consult if alternative nutrition is preferred.     6.) RD to monitor wt, PO intake, skin, labs- RD to perform NFPE next f/u if appropriate.      Goals: to meet % of EEN/EPN by next RD f/u  Nutrition Goal Status: new  Communication of RD Recs:  (POC)    Assessment and Plan    Nutrition Problem  Increased PRO needs    Related to (etiology):   Increased physiological needs    Signs and Symptoms (as evidenced by):   Multi skin wounds on left and right feet; chronic osteomyelitis.      Interventions/Recommendations (treatment strategy):  Collaboration of nutritional care with other providers.  ONS+ MVI+ VitC    Nutrition Diagnosis Status:   New     Reason for Assessment    Reason For Assessment: length of stay  Diagnosis: infection/sepsis (Bacteremia due to Gram-negative bacteria)  Relevant Medical History: afib, T2DM, PVD s/p left foot amputation, chronic osteomyelitis, HTN, HLD, CKD3  Interdisciplinary Rounds: did not attend  General Information Comments: RD LOS: pt is FRANKIE d/t pacemaker implant today. Nutritional hx could not be taken. NFPE to be performed next RD f/u. Noted that moderate BLE and BUE edema is present. Pt is at risk for wt flux d/t edema present. Noted right heel wounds and left foot wounds are present. As per chart review, pt has had a 9% wt loss x 8 mo. RD suspects malnutrition.  Nutrition Discharge Planning: adequate intake    Nutrition Risk Screen    Nutrition Risk Screen: no indicators  "present    Nutrition/Diet History    Spiritual, Cultural Beliefs, Pent[employer]al Practices, Values that Affect Care: no    Anthropometrics    Temp: 98.2 °F (36.8 °C)  Height: 5' 9" (175.3 cm)  Height (inches): 69 in  Weight Method: Bed Scale  Weight: 112.5 kg (248 lb)  Weight (lb): 248 lb  Ideal Body Weight (IBW), Male: 160 lb  % Ideal Body Weight, Male (lb): 155 %  BMI (Calculated): 36.6  BMI Grade: 35 - 39.9 - obesity - grade II    Lab/Procedures/Meds    Pertinent Labs Reviewed: reviewed  Pertinent Labs Comments: K: 5.5, BUN: 56, cr: 4.1, Ca: 8.5, alb: 2.2  Pertinent Medications Reviewed: reviewed  Pertinent Medications Comments: VitC, statin, insulin, MVI, pantoprazole    Estimated/Assessed Needs    Weight Used For Calorie Calculations: 112.5 kg (248 lb 0.3 oz)  Energy Calorie Requirements (kcal): 1855 kcal  Energy Need Method: Shannon-St Jeor (MSJ x 1.0- d/t obesity)  Protein Requirements: 135- 169g (1.2-1.5g/kg d/t skin wounds present)  Weight Used For Protein Calculations: 112.5 kg (248 lb 0.3 oz)  Fluid Requirements (mL): 1ml/1kcal or per MD  Estimated Fluid Requirement Method: RDA Method  RDA Method (mL): 1855  CHO Requirement: 232g    Nutrition Prescription Ordered    Current Diet Order: NPO    Evaluation of Received Nutrient/Fluid Intake    I/O: -717ml since 10/7  Energy Calories Required: not meeting needs  Protein Required: not meeting needs  Fluid Required:  (as per MD)  Comments: LBM 10/11  Tolerance:  (NPO)  % Intake of Estimated Energy Needs: 0 - 25 %  % Meal Intake: NPO    Nutrition Risk    Level of Risk/Frequency of Follow-up:  (RD to f/u x1-2/week)     Monitor and Evaluation    Food and Nutrient Intake: energy intake, food and beverage intake  Food and Nutrient Adminstration: diet order  Physical Activity and Function: nutrition-related ADLs and IADLs  Anthropometric Measurements: weight, weight change, body mass index  Biochemical Data, Medical Tests and Procedures: electrolyte and renal panel, " gastrointestinal profile, glucose/endocrine profile, inflammatory profile, lipid profile  Nutrition-Focused Physical Findings: overall appearance, skin     Nutrition Follow-Up    RD Follow-up?: Yes

## 2023-10-12 NOTE — PROGRESS NOTES
10/12/23 1545   Vital Signs   BP (!) 200/88   MAP (mmHg) 127   BP Location Right arm   BP Method Automatic   Patient Position Lying     Dr. Mcmillan notified. Patien received 100 mg hydralazine PO @ 1441. Dr. Mcmillan to order 10 mg hydralazine IV now. Will carry out order and continue to monitor BP

## 2023-10-12 NOTE — ASSESSMENT & PLAN NOTE
"Non Oliguric YNES on baseline CKD III with sub nephrotic range protienuira (1.15g).   - baselien Scr ~1-1.4; Scr at time of consultation ~4.5  - UA with 2+ protein; Trace blood; 2 RBC.   - Renal US "The right kidney measures 12.1 cm.; The left kidney measures 12.8 cm.; MRD"   - 10/11/23 Urine Micro "Bacteria noted, no overt casts, some granular sediment"  - Suspect YNES from ischemic ATN from hypoperfusion in setting of shock (on dobutamine) and symptomatic bradycardia. Additional insult from Sepsis (bacteremia) and Vancomycin toxicity (trough ~27 on 10/4).   - Scr 4.5-->4.3-->4.1 today.   Plan:  - Give Normal Saline 1L @ 100cc/hr over 10 hours.   - Avoid drastic changes in BP  - strict ins and outs   - Renally dose all medications  - avoid nephrotoxic agents  "

## 2023-10-12 NOTE — ASSESSMENT & PLAN NOTE
73-year-old male with history of afib, T2DM, PVD s/p left foot amputation and chronic OM on IV abx (linezolid, cefepime, metronidzole) at NH presented with weakness found to have sepsis secondary to ESBL Klebsiella and  Acinetobacter bacteremia. likely source includes PICC line - also consider from left heel wound. PICC line removed 10/4/2023. Repeat blood cultures NGTD.     Cefidericol was added to blood culture report from 10/2, awaiting results of sensitivity report. Contacted micro lab and stated sensitivy report would return in 7d. Wound culture + C. Albicans and achromobacter xylosoxidans.     Vascular surgery was following, stating no surgical intervention planned. Left foot xray cf osteo of posterior calcaneous. Left foot wound culture + c. Albicans and achromobacter xylosoxidians. To note, pt has followed with wound care who recommended ED visit in early September which pt declined. Ended up presenting to  ED on 9/13 with cf sepsis 2/2 bilateral BLE wounds. Pt not amendable for amputation. Was discharged with 6 weeks of linezolid, cefepime, and flagyl x6 weeks for osteo of left stump, EOC ~10/25/23. Pt now s/p pacemaker placement on 10/12, no complications. Doing well. Planing to return to nursing home to complete IV therapy.       Recommendations:  1. Continue cefidericol 1g IV q8hr (CrCl 18- renally dosed) and fluconazole 200mg qd   2. Will follow for cefidericol sensitivity report from outpatient. Pending from micro lab.   3. No further vascular surgical plans at this time. Stating pt has ability to heal wounds. Recommend to continue frequent wound care, off loading, and nutritional support.   4. Pt has history recurrent wound infections cb osteomyelitis with MDRO in the past. He will continue to develop resistance with ongoing antibiotic exposures, and d/t his multiple open wounds, he is at increased risk for recurrent infections. Wound consider surgical intervention for optimal chance of curing  his infection.   5. Plan to continue IV therapy until original end of care date for osteo (10/25/23), this will cover bacteremia as well   6. Plan reviewed with ID staff. ID will sign off. See OPAT below.      Outpatient Antibiotic Therapy Plan:    Please send referral to [facility].    1) Infection: klebsiella pneumoniae ESBL/acinetobacter  bacteremia, cleared 10/6 & c,. Albicans and achromobacter from left foot wound (underlying osteo).     2) Discharge Antibiotics:    Intravenous antibiotics:   cefiderocol 1g IV q8hr      Oral antibiotics:   Fluconazole 200 mg PO qd      3) Therapy Duration:  2 weeks from cleared culture (10/20/23) for bacteremia and until 10/25/23 for previous left leg osteomyelitis     Estimated end date of IV antibiotics: 10/25/2023    4) Outpatient Weekly Labs:    Order the following labs to be drawn on Mondays:    CBC   CMP    CRP      5) Fax Lab Results to Infectious Diseases Provider: ANIYAH Desir FNP-C    [facility] Fax Number: [phone]    6) Outpatient Infectious Diseases Follow-up     Follow-up appointment will be arranged by the ID clinic and will be found in the patient's appointments tab.     Prior to discharge, please ensure the patient's follow-up has been scheduled.     If there is still no follow-up scheduled prior to discharge, please send an EPIC message to Jennifer Sapp in Infectious Diseases.

## 2023-10-13 DIAGNOSIS — I44.2 CHB (COMPLETE HEART BLOCK): ICD-10-CM

## 2023-10-13 DIAGNOSIS — Z95.0 CARDIAC PACEMAKER IN SITU: Primary | ICD-10-CM

## 2023-10-13 LAB
ALBUMIN SERPL BCP-MCNC: 2.2 G/DL (ref 3.5–5.2)
ANION GAP SERPL CALC-SCNC: 8 MMOL/L (ref 8–16)
BUN SERPL-MCNC: 51 MG/DL (ref 8–23)
CALCIUM SERPL-MCNC: 8.8 MG/DL (ref 8.7–10.5)
CHLORIDE SERPL-SCNC: 108 MMOL/L (ref 95–110)
CO2 SERPL-SCNC: 22 MMOL/L (ref 23–29)
CREAT SERPL-MCNC: 3.9 MG/DL (ref 0.5–1.4)
ERYTHROCYTE [DISTWIDTH] IN BLOOD BY AUTOMATED COUNT: 19.5 % (ref 11.5–14.5)
EST. GFR  (NO RACE VARIABLE): 15.4 ML/MIN/1.73 M^2
GLUCOSE SERPL-MCNC: 124 MG/DL (ref 70–110)
HCT VFR BLD AUTO: 25 % (ref 40–54)
HGB BLD-MCNC: 7.8 G/DL (ref 14–18)
MCH RBC QN AUTO: 28.2 PG (ref 27–31)
MCHC RBC AUTO-ENTMCNC: 31.2 G/DL (ref 32–36)
MCV RBC AUTO: 90 FL (ref 82–98)
PHOSPHATE SERPL-MCNC: 4.1 MG/DL (ref 2.7–4.5)
PLATELET # BLD AUTO: 275 K/UL (ref 150–450)
PMV BLD AUTO: 10.8 FL (ref 9.2–12.9)
POCT GLUCOSE: 112 MG/DL (ref 70–110)
POCT GLUCOSE: 234 MG/DL (ref 70–110)
POCT GLUCOSE: 237 MG/DL (ref 70–110)
POCT GLUCOSE: 89 MG/DL (ref 70–110)
POTASSIUM SERPL-SCNC: 5.4 MMOL/L (ref 3.5–5.1)
RBC # BLD AUTO: 2.77 M/UL (ref 4.6–6.2)
SODIUM SERPL-SCNC: 138 MMOL/L (ref 136–145)
WBC # BLD AUTO: 8.08 K/UL (ref 3.9–12.7)

## 2023-10-13 PROCEDURE — 99233 SBSQ HOSP IP/OBS HIGH 50: CPT | Mod: ,,, | Performed by: REGISTERED NURSE

## 2023-10-13 PROCEDURE — 21400001 HC TELEMETRY ROOM

## 2023-10-13 PROCEDURE — 99231 SBSQ HOSP IP/OBS SF/LOW 25: CPT | Mod: ,,, | Performed by: INTERNAL MEDICINE

## 2023-10-13 PROCEDURE — 27000207 HC ISOLATION

## 2023-10-13 PROCEDURE — 80069 RENAL FUNCTION PANEL: CPT | Performed by: HOSPITALIST

## 2023-10-13 PROCEDURE — 85027 COMPLETE CBC AUTOMATED: CPT | Performed by: HOSPITALIST

## 2023-10-13 PROCEDURE — 36415 COLL VENOUS BLD VENIPUNCTURE: CPT | Performed by: HOSPITALIST

## 2023-10-13 PROCEDURE — 99233 SBSQ HOSP IP/OBS HIGH 50: CPT | Mod: ,,, | Performed by: STUDENT IN AN ORGANIZED HEALTH CARE EDUCATION/TRAINING PROGRAM

## 2023-10-13 PROCEDURE — 25000003 PHARM REV CODE 250: Performed by: STUDENT IN AN ORGANIZED HEALTH CARE EDUCATION/TRAINING PROGRAM

## 2023-10-13 PROCEDURE — 25000003 PHARM REV CODE 250: Performed by: INTERNAL MEDICINE

## 2023-10-13 PROCEDURE — 25000003 PHARM REV CODE 250: Performed by: PHYSICIAN ASSISTANT

## 2023-10-13 PROCEDURE — 97112 NEUROMUSCULAR REEDUCATION: CPT

## 2023-10-13 PROCEDURE — 97530 THERAPEUTIC ACTIVITIES: CPT

## 2023-10-13 PROCEDURE — 25000003 PHARM REV CODE 250: Performed by: HOSPITALIST

## 2023-10-13 PROCEDURE — 99233 PR SUBSEQUENT HOSPITAL CARE,LEVL III: ICD-10-PCS | Mod: ,,, | Performed by: REGISTERED NURSE

## 2023-10-13 PROCEDURE — 63600175 PHARM REV CODE 636 W HCPCS: Mod: JZ,TB | Performed by: PHYSICIAN ASSISTANT

## 2023-10-13 PROCEDURE — 99231 PR SUBSEQUENT HOSPITAL CARE,LEVL I: ICD-10-PCS | Mod: ,,, | Performed by: INTERNAL MEDICINE

## 2023-10-13 PROCEDURE — 99233 PR SUBSEQUENT HOSPITAL CARE,LEVL III: ICD-10-PCS | Mod: ,,, | Performed by: STUDENT IN AN ORGANIZED HEALTH CARE EDUCATION/TRAINING PROGRAM

## 2023-10-13 PROCEDURE — 25000003 PHARM REV CODE 250

## 2023-10-13 PROCEDURE — 97129 THER IVNTJ 1ST 15 MIN: CPT

## 2023-10-13 RX ADMIN — ACETAMINOPHEN 650 MG: 325 TABLET ORAL at 05:10

## 2023-10-13 RX ADMIN — TAMSULOSIN HYDROCHLORIDE 0.4 MG: 0.4 CAPSULE ORAL at 10:10

## 2023-10-13 RX ADMIN — CEFIDEROCOL SULFATE TOSYLATE 1 G: 1 INJECTION, POWDER, FOR SOLUTION INTRAVENOUS at 07:10

## 2023-10-13 RX ADMIN — HYDRALAZINE HYDROCHLORIDE 100 MG: 50 TABLET ORAL at 05:10

## 2023-10-13 RX ADMIN — GABAPENTIN 100 MG: 300 CAPSULE ORAL at 09:10

## 2023-10-13 RX ADMIN — GABAPENTIN 100 MG: 300 CAPSULE ORAL at 10:10

## 2023-10-13 RX ADMIN — CLOPIDOGREL BISULFATE 75 MG: 75 TABLET ORAL at 10:10

## 2023-10-13 RX ADMIN — SODIUM ZIRCONIUM CYCLOSILICATE 10 G: 10 POWDER, FOR SUSPENSION ORAL at 03:10

## 2023-10-13 RX ADMIN — MICONAZOLE NITRATE: 20 POWDER TOPICAL at 09:10

## 2023-10-13 RX ADMIN — CEFIDEROCOL SULFATE TOSYLATE 1 G: 1 INJECTION, POWDER, FOR SOLUTION INTRAVENOUS at 01:10

## 2023-10-13 RX ADMIN — PANTOPRAZOLE SODIUM 40 MG: 40 TABLET, DELAYED RELEASE ORAL at 10:10

## 2023-10-13 RX ADMIN — INSULIN ASPART 5 UNITS: 100 INJECTION, SOLUTION INTRAVENOUS; SUBCUTANEOUS at 11:10

## 2023-10-13 RX ADMIN — THERA TABS 1 TABLET: TAB at 10:10

## 2023-10-13 RX ADMIN — APIXABAN 5 MG: 5 TABLET, FILM COATED ORAL at 09:10

## 2023-10-13 RX ADMIN — INSULIN ASPART 2 UNITS: 100 INJECTION, SOLUTION INTRAVENOUS; SUBCUTANEOUS at 05:10

## 2023-10-13 RX ADMIN — CEFIDEROCOL SULFATE TOSYLATE 1 G: 1 INJECTION, POWDER, FOR SOLUTION INTRAVENOUS at 10:10

## 2023-10-13 RX ADMIN — OXYCODONE HYDROCHLORIDE AND ACETAMINOPHEN 500 MG: 500 TABLET ORAL at 09:10

## 2023-10-13 RX ADMIN — HYDRALAZINE HYDROCHLORIDE 100 MG: 50 TABLET ORAL at 03:10

## 2023-10-13 RX ADMIN — SODIUM ZIRCONIUM CYCLOSILICATE 10 G: 10 POWDER, FOR SUSPENSION ORAL at 08:10

## 2023-10-13 RX ADMIN — INSULIN ASPART 2 UNITS: 100 INJECTION, SOLUTION INTRAVENOUS; SUBCUTANEOUS at 01:10

## 2023-10-13 RX ADMIN — OXYCODONE HYDROCHLORIDE AND ACETAMINOPHEN 500 MG: 500 TABLET ORAL at 10:10

## 2023-10-13 RX ADMIN — ACETAMINOPHEN 650 MG: 325 TABLET ORAL at 10:10

## 2023-10-13 RX ADMIN — MICONAZOLE NITRATE: 20 POWDER TOPICAL at 10:10

## 2023-10-13 RX ADMIN — INSULIN ASPART 5 UNITS: 100 INJECTION, SOLUTION INTRAVENOUS; SUBCUTANEOUS at 10:10

## 2023-10-13 RX ADMIN — ATORVASTATIN CALCIUM 80 MG: 40 TABLET, FILM COATED ORAL at 09:10

## 2023-10-13 RX ADMIN — APIXABAN 5 MG: 5 TABLET, FILM COATED ORAL at 10:10

## 2023-10-13 RX ADMIN — HYDRALAZINE HYDROCHLORIDE 100 MG: 50 TABLET ORAL at 09:10

## 2023-10-13 RX ADMIN — FLUCONAZOLE 200 MG: 200 TABLET ORAL at 10:10

## 2023-10-13 RX ADMIN — INSULIN ASPART 5 UNITS: 100 INJECTION, SOLUTION INTRAVENOUS; SUBCUTANEOUS at 05:10

## 2023-10-13 NOTE — SUBJECTIVE & OBJECTIVE
Interval History: Awaiting cefiderocol sensitivity report per ID.  Potassium from yesterday was 5.2 repeat potassium was ordered,  However Patient left for ICD placement and came back around 6pm.  repeat potassium was 6.2.  Hyperkalemia power plan initiated    tReview of Systems   Constitutional:  Negative for chills and fever.   Neurological:  Negative for seizures and syncope.     Objective:     Vital Signs (Most Recent):  Temp: 99 °F (37.2 °C) (10/12/23 1919)  Pulse: 65 (10/12/23 1919)  Resp: 18 (10/12/23 1919)  BP: (!) 173/72 (10/12/23 1919)  SpO2: (!) 94 % (10/12/23 1919) Vital Signs (24h Range):  Temp:  [98 °F (36.7 °C)-99 °F (37.2 °C)] 99 °F (37.2 °C)  Pulse:  [40-91] 65  Resp:  [11-20] 18  SpO2:  [94 %-100 %] 94 %  BP: (160-203)/(70-91) 173/72     Weight: 112.5 kg (248 lb)  Body mass index is 36.62 kg/m².    Intake/Output Summary (Last 24 hours) at 10/12/2023 1958  Last data filed at 10/12/2023 1818  Gross per 24 hour   Intake 400 ml   Output 250 ml   Net 150 ml           Physical Exam  Vitals and nursing note reviewed.   Constitutional:       General: He is not in acute distress.     Appearance: He is well-developed. He is obese. He is not diaphoretic.   Musculoskeletal:      Right lower leg: Edema present.      Left lower leg: Edema present.   Skin:     General: Skin is warm and dry.      Coloration: Skin is not jaundiced or pale.   Neurological:      Mental Status: He is alert and oriented to person, place, and time. Mental status is at baseline.      Motor: No seizure activity.   Psychiatric:         Attention and Perception: Attention normal.         Mood and Affect: Affect normal.         Behavior: Behavior is not aggressive or combative.             Significant Labs: All pertinent labs within the past 24 hours have been reviewed.  Recent Labs   Lab 10/11/23  0332 10/12/23  0521 10/12/23  0901    138 140   K 5.2* 5.5* 6.2*    109 109   CO2 21* 22* 21*   BUN 57* 56* 57*   CREATININE 4.3*  4.1* 4.2*   CALCIUM 8.4* 8.5* 8.3*           Significant Imaging: I have reviewed all pertinent imaging results/findings within the past 24 hours.

## 2023-10-13 NOTE — ASSESSMENT & PLAN NOTE
K-6.2 this am, (most likely hemolyzed sample )  1 dose of Lokelma was given prior to the procedure. 1 dose of calcium gluconate given and EKG was ordered, hyperkalemia per plan was initiated.   However Repeat potassium was 5.3. orders were discontinued.  We will only continue Lokelma for now

## 2023-10-13 NOTE — PROGRESS NOTES
[facility] - Intensive Care ([address] Medicine  Progress Note    Patient Name: Saji Castañeda  MRN: [id number]  Patient Class: IP- Inpatient   Admission Date: 10/2/2023  Length of Stay: 10 days  Attending Physician: Yakelin Ying MD  Primary Care Provider: [facility] -        Subjective:     Principal Problem:Bacteremia due to Gram-negative bacteria        HPI:  Saji Castañeda is a 73 year old Black man with hypertension, hyperlipidemia, diabetes mellitus type 2 with neuropathy, left ventricular concentric hypertrophy, paroxysmal atrial fibrillation (anticoagulated on apixaban), Mobitz type I atrioventricular heart block, chronic kidney disease stage 3, chronic venous hypertension, peripheral vascular disease, 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 He lives in [place], [place].               He presented to [facility] on 10/2/2023 from [facility] due to concerns of weakness and feeling like he was going to die. He reported no dizziness or headaches. He knew his name, where he was, and the current year but had trouble remembering which nursing home he came from. Blood glucose was in the 20s. He was given dextrose in the emergency department and glucose improved to 96 mg/dL. Labs showed acute kidney injury with creatinine of 2.6 mg/dL from baseline of 1.3 and lactic acid elevated at 2.29 mmol/L. He had complete heart block. He was admitted to Cardiology.      Overview/Hospital Course:  Right internal jugular central venous catheter was placed. He was found to have acute osteomyelitis of the posterior aspect of the calcaneus. He had hypotension concerning for septic shock. He was started on dobutamine, vancomycin, and piperacillin-tazobactam.  Blood culture grew Acinetobacter baumannii and Klebsiella pneumoniae. He had aphasia. Head CT and brain MRI showed no acute stroke. Infectious Disease was consulted as he had been on linezolid, cefepime, and metronidazole for osteomyelitis. PICC line was removed and IV access was replaced in anticipation for long-term IV antibiotics. Podiatry was consulted and recommended X-rays and Wound Care consult. Wound was cultured. He had acute on chronic renal failure. Renal ultrasound showed no obstruction. He was given fluid resuscitation. Heart rate was able to augment appropriately with exertion with transmission of P waves. Repeat EKG showed 2nd degree AV block replacing junctional rhythm. Electrophysiology recommended pacemaker placement. Vascular Surgery suspected poor wound healing to be due to severe edema. They ordered ankle brachial indices but it was unable to be done due to the hardness of his skin. They did not recommend amputation based on their physical exam findings. He was transferred to Hospital Medicine Team D on 10/5/2023. Infectious Disease changed piperacillin-tazobactam to cefidericol and fluconazole. He continued to have asymptomatic bradycardia. Foot wound culture grew Achromobacter xylosoxidans dentrificans and Candida albicans. Lower extremity arterial ultrasound suggested moderate stenosis of the right anterior tibial artery and severe stenosis of the right posterior tibial artery. Vascular Surgery reviewed this and felt it would not hinder his ability to heal. Electrophysiology reviewed his EKG on 10/8/2023 and noted 2nd degree heart block Mobitz I. Renal function did not improve. Fractional excretion of sodium was 1.5% suggesting intrinsic cause. Urinalysis showed pyuria and bacteriuria, similar to 2 other urinalyses done since admission, but urine culture again had no growth. Nephrology was consulted for persistent acute kidney injury and suspected ischemic acute tubular necrosis from  hypoperfusion in setting of shock (on dobutamine) and symptomatic bradycardia with additional insult from sepsis (bacteremia) and vancomycin toxicity (trough ~27 on 10/4/2023). Wound Care recommended diabetic sock or light sock compression rather than compression wrappings, but noted that his wound looked good.       Interval History: Awaiting cefiderocol sensitivity report per ID.  Potassium from yesterday was 5.2 repeat potassium was ordered,  However Patient left for ICD placement and came back around 6pm.  repeat potassium was 6.2.  Hyperkalemia power plan initiated    [facility]   Constitutional:  Negative for chills and fever.   Neurological:  Negative for seizures and syncope.     Objective:     Vital Signs (Most Recent):  Temp: 99 °F (37.2 °C) (10/12/23 1919)  Pulse: 65 (10/12/23 1919)  Resp: 18 (10/12/23 1919)  BP: (!) 173/72 (10/12/23 1919)  SpO2: (!) 94 % (10/12/23 1919) Vital Signs (24h Range):  Temp:  [98 °F (36.7 °C)-99 °F (37.2 °C)] 99 °F (37.2 °C)  Pulse:  [40-91] 65  Resp:  [11-20] 18  SpO2:  [94 %-100 %] 94 %  BP: (160-203)/(70-91) 173/72     Weight: 112.5 kg (248 lb)  Body mass index is 36.62 kg/m².    Intake/Output Summary (Last 24 hours) at 10/12/2023 1958  Last data filed at 10/12/2023 1818  Gross per 24 hour   Intake 400 ml   Output 250 ml   Net 150 ml           Physical Exam  Vitals and nursing note reviewed.   Constitutional:       General: He is not in acute distress.     Appearance: He is well-developed. He is obese. He is not diaphoretic.   Musculoskeletal:      Right lower leg: Edema present.      Left lower leg: Edema present.   Skin:     General: Skin is warm and dry.      Coloration: Skin is not jaundiced or pale.   Neurological:      Mental Status: He is alert and oriented to person, place, and time. Mental status is at baseline.      Motor: No seizure activity.   Psychiatric:         Attention and Perception: Attention normal.         Mood and Affect: Affect normal.          Behavior: Behavior is not aggressive or combative.             Significant Labs: All pertinent labs within the past 24 hours have been reviewed.  Recent Labs   Lab 10/11/23  0332 10/12/23  0521 10/12/23  0901    138 140   K 5.2* 5.5* 6.2*    109 109   CO2 21* 22* 21*   BUN 57* 56* 57*   CREATININE 4.3* 4.1* 4.2*   CALCIUM 8.4* 8.5* 8.3*           Significant Imaging: I have reviewed all pertinent imaging results/findings within the past 24 hours.        Assessment/Plan:      * Bacteremia due to Gram-negative bacteria  On cefidericol and fluconazole. Appreciate Infectious Disease.    Hyperkalemia  K-6.2 this am, (most likely hemolyzed sample )  1 dose of Lokelma was given prior to the procedure. 1 dose of calcium gluconate given and EKG was ordered, hyperkalemia per plan was initiated.   However Repeat potassium was 5.3. orders were discontinued.  We will only continue Lokelma for now        Complete heart block        Asymptomatic bacteriuria  Each urine culture has pyuria and bacteriuria. Urine culture on 10/2/2023 had no growth. Urine culture on 10/4/2023 grew multiple organisms with none in predominance. Urine culture on 10/9/2023 had no growth.     Blurred vision, left eye  Reports this to be chronic. Follow up with an ophthalmologist.    History of complete heart block  AV block, 2nd degree  Electrophysiology recommends pacemaker.    Paroxysmal atrial fibrillation  Continue home apixaban.     AV block, 2nd degree  Pacemaker placed on 10/12.    Chronic osteomyelitis        Nonhealing ulcer of right lower leg with fat layer exposed  Edema of leg  Vascular Surgery believes edema contributes to poor wound healing. They did not recommend amputation. They recommended compression wrappings. Consulted Wound Care for this. Already on medical management for PAD with clopidrogel, apixaban, atorvastatin.     Chronic deep vein thrombosis (DVT) of right upper extremity  Continue home apixaban.    Peripheral  arterial disease  Continue home clopidrogel and atorvastatin. Evaluated by Interventional Cardiology in May and deemed not to be a revascularization candidate due to extensive disease, multiple comorbidities, debility/bed bound status and concern for noncompliance.    Acute on chronic renal failure  On admission, has worsened since. Monitor labs. FENa 1.5%. Occasional eosinophils. No obstruction on renal ultrasound. Appreciate Nephrology, who believe it is multifactorial.    YNES (acute kidney injury)  Patient with acute kidney injury/acute renal failure likely due to pre-renal azotemia due to dehydration YNES is currently stable. Baseline creatinine 2 - Labs reviewed- Renal function/electrolytes with Estimated Creatinine Clearance: 20 mL/min (A) (based on SCr of 4 mg/dL (H)). according to latest data. Monitor urine output and serial BMP and adjust therapy as needed. Avoid nephrotoxins and renally dose meds for GFR listed above.    Insulin dependent type 2 diabetes mellitus  Holding home glipizide. He takes insulin glargine 45 units HS and insulin aspart 12 units TID. Giving insulin detemir 25 units HS, insulin aspart 5 units TID and sliding scale. Monitor Accuchecks.    Edema of leg        VTE Risk Mitigation (From admission, onward)         Ordered     apixaban tablet 5 mg  2 times daily         10/11/23 0932     IP VTE HIGH RISK PATIENT  Once         10/02/23 1513     Place sequential compression device  Until discontinued         10/02/23 1513                Discharge Planning   OXANA: 10/15/2023     Code Status: Full Code   Is the patient medically ready for discharge?: No    Reason for patient still in hospital (select all that apply): Patient trending condition, Treatment and Pending disposition  Discharge Plan A: Home Health                  Yakelin Ying MD  Department of Hospital Medicine   [facility] - Intensive Care ([place])

## 2023-10-13 NOTE — SUBJECTIVE & OBJECTIVE
Interval History:     Blood cultures +ESBL Klebsiella and  Acinetobacter, repeats on 10/6 NGTD.   Wound cultures + C. Albicans and achromobacter xylosoxidans  S/p pacemaker placement on 10/12, doing well. afebrile. No acute complaints or concerns       Review of Systems   Constitutional:  Negative for chills, diaphoresis, fatigue and fever.   HENT: Negative.     Eyes: Negative.    Respiratory:  Negative for cough and shortness of breath.    Cardiovascular:  Negative for chest pain, palpitations and leg swelling.   Gastrointestinal:  Negative for abdominal pain, diarrhea, nausea and vomiting.   Musculoskeletal:  Negative for arthralgias, back pain and neck pain.   Skin:  Positive for wound. Negative for rash.   Neurological:  Negative for weakness, numbness and headaches.   Psychiatric/Behavioral:  Negative for agitation.      Objective:     Vital Signs (Most Recent):  Temp: 98.4 °F (36.9 °C) (10/13/23 1141)  Pulse: (!) 52 (10/13/23 1232)  Resp: 18 (10/13/23 1141)  BP: (!) 141/65 (10/13/23 1141)  SpO2: 98 % (10/13/23 1141) Vital Signs (24h Range):  Temp:  [98 °F (36.7 °C)-99 °F (37.2 °C)] 98.4 °F (36.9 °C)  Pulse:  [48-91] 52  Resp:  [11-21] 18  SpO2:  [94 %-100 %] 98 %  BP: (133-203)/(63-91) 141/65     Weight: 112.5 kg (248 lb)  Body mass index is 36.62 kg/m².    Estimated Creatinine Clearance: 20.5 mL/min (A) (based on SCr of 3.9 mg/dL (H)).     Physical Exam  Vitals and nursing note reviewed.   Constitutional:       General: He is not in acute distress.     Appearance: Normal appearance. He is well-developed. He is not ill-appearing or diaphoretic.   HENT:      Head: Normocephalic and atraumatic.   Eyes:      Pupils: Pupils are equal, round, and reactive to light.   Cardiovascular:      Rate and Rhythm: Normal rate and regular rhythm.      Heart sounds: Normal heart sounds. No murmur heard.  Pulmonary:      Effort: Pulmonary effort is normal. No respiratory distress.      Breath sounds: Normal breath sounds. No  stridor.   Abdominal:      General: Bowel sounds are normal. There is no distension.   Musculoskeletal:         General: No deformity. Normal range of motion.      Cervical back: Normal range of motion and neck supple.   Skin:     General: Skin is warm and dry.      Findings: Lesion present. No erythema or rash.      Comments: RIJ line in place c/d/I  Picc line removed    Bilateral heel wounds dressed. Refer to media    Neurological:      Mental Status: He is alert and oriented to person, place, and time.   Psychiatric:         Mood and Affect: Mood normal.         Behavior: Behavior normal.         Thought Content: Thought content normal.         Judgment: Judgment normal.                        Significant Labs: All pertinent labs within the past 24 hours have been reviewed.    Significant Imaging: I have reviewed all pertinent imaging results/findings within the past 24 hours.

## 2023-10-13 NOTE — ASSESSMENT & PLAN NOTE
"Non Oliguric YNES on baseline CKD III with sub nephrotic range protienuira (1.15g).   - baselien Scr ~1-1.4; Scr at time of consultation ~4.5  - UA with 2+ protein; Trace blood; 2 RBC.   - Renal US "The right kidney measures 12.1 cm.; The left kidney measures 12.8 cm.; MRD"   - 10/11/23 Urine Micro "Bacteria noted, no overt casts, some granular sediment"  - Suspect YNES from ischemic ATN from hypoperfusion in setting of shock (on dobutamine) and symptomatic bradycardia. Additional insult from Sepsis (bacteremia) and Vancomycin toxicity (trough ~27 on 10/4).   - Scr 4.5-->4.3-->4.1-->3.9  today.   Plan:  - supportive care  - Avoid drastic changes in BP  - strict ins and outs   - Renally dose all medications  - avoid nephrotoxic agents  "

## 2023-10-13 NOTE — PT/OT/SLP PROGRESS
"Occupational Therapy   Treatment    Name: Saji Castañeda  MRN: [id number]  Admitting Diagnosis:  Bacteremia due to Gram-negative bacteria  1 Day Post-Op    Recommendations:     Discharge Recommendations: nursing facility, skilled  Discharge Equipment Recommendations:  to be determined by next level of care  Barriers to discharge:  None    Assessment:     Saji Castañeda is a 73 y.o. male with a medical diagnosis of Bacteremia due to Gram-negative bacteria.  He presents with the following performance deficits affecting function are weakness, impaired endurance, impaired self care skills, impaired functional mobility, decreased lower extremity function, decreased upper extremity function, orthopedic precautions, gait instability, impaired balance.     Rehab Prognosis:  Good; patient would benefit from acute skilled OT services to address these deficits and reach maximum level of function.       Plan:     Patient to be seen 3 x/week to address the above listed problems via self-care/home management, therapeutic activities, therapeutic exercises, neuromuscular re-education  Plan of Care Expires: 11/04/23  Plan of Care Reviewed with: patient    Subjective     Chief Complaint: "That feels better."  Patient/Family Comments/goals: Participate with therapy  Pain/Comfort:  Pain Rating 1: 0/10  Pain Rating Post-Intervention 1: 0/10    Objective:     Communicated with: Nursing prior to session.  Patient found HOB elevated with Condom Catheter, telemetry upon OT entry to room.    General Precautions: Standard, fall    Orthopedic Precautions:RLE partial weight bearing, LLE non weight bearing  Braces:  (Darco)  Respiratory Status: Room air     Occupational Performance:     Bed Mobility:    Patient completed Rolling/Turning to Left with  stand by assistance  Patient completed Scooting/Bridging with stand by assistance  Patient completed Supine to Sit with stand by assistance     Functional Mobility/Transfers:  CGA-min A to complete " Left a message on voicemail to call us back scoot pivot from edge of bed<>to wheelchair; required cues for UE engagement and anterior weight shift for safety with scooting, laterally to R     Activities of Daily Living:  Grooming: stand by assistance seated edge of bed   Upper Body Dressing: stand by assistance seated edge of bed       [facility] 6 Click ADL: 17    Treatment & Education:  -Patient and family educated on roles/goals of OT and POC.  -White board updated.  -Therapist provided time for questions and answered within scope of practice.  -Patient educated on importance of EOB/OOB activity to maximize recovery.     Patient left  seated in wheelchair  with all lines intact, call button in reach, and PCT/nursing notified    GOALS:   Multidisciplinary Problems       Occupational Therapy Goals          Problem: Occupational Therapy    Goal Priority Disciplines Outcome Interventions   Occupational Therapy Goal     OT, PT/OT Ongoing, Progressing    Description: Goals to be met by: 10-15-23     Patient will increase functional independence with ADLs by performing:    UE Dressing with Set-up Assistance.  LE Dressing with Supervision in supine  Grooming while seated with Set-up Assistance.  Toileting from drop arm commode with Minimal Assistance for hygiene and clothing management.   Supine to sit with Supervision.  Squat pivot transfers with Min A in darco shoe with WB through R heel only in darco  Toilet transfer to drop arm commode with Minimal Assistance.  Pt. To be I with HEP in BUE to improve level of endurance                         Time Tracking:     OT Date of Treatment: 10/13/23  OT Start Time: 1120  OT Stop Time: 1137  OT Total Time (min): 17 min    Billable Minutes:Neuromuscular Re-education 17    OT/KATHLEEN: OT     Number of KATHLEEN visits since last OT visit: 2    10/13/2023

## 2023-10-13 NOTE — PLAN OF CARE
Problem: Adult Inpatient Plan of Care  Goal: Plan of Care Review  Outcome: Ongoing, Progressing     Problem: Diabetes Comorbidity  Goal: Blood Glucose Level Within Targeted Range  Outcome: Ongoing, Progressing     Problem: Infection  Goal: Absence of Infection Signs and Symptoms  Outcome: Ongoing, Progressing     Problem: Impaired Wound Healing  Goal: Optimal Wound Healing  Outcome: Ongoing, Progressing     Problem: Renal Function Impairment (Acute Kidney Injury/Impairment)  Goal: Effective Renal Function  Outcome: Ongoing, Progressing     Problem: Fall Injury Risk  Goal: Absence of Fall and Fall-Related Injury  Outcome: Ongoing, Progressing

## 2023-10-13 NOTE — SUBJECTIVE & OBJECTIVE
Interval History:       No acute events overnight. Patient underwent pace maker placement yesterday. Doing well this AM without acute complaints. Scr down to 3.9.     Review of patient's allergies indicates:  No Known Allergies  Current Facility-Administered Medications   Medication Frequency    acetaminophen tablet 650 mg Q6H    acetaminophen tablet 650 mg Q4H PRN    ammonium lactate 12 % lotion BID PRN    apixaban tablet 5 mg BID    ascorbic acid (vitamin C) tablet 500 mg BID    atorvastatin tablet 80 mg QHS    bisacodyL EC tablet 5 mg Daily PRN    cefiderocoL 1 g in dextrose 5 % (D5W) 100 mL infusion Q8H    clopidogreL tablet 75 mg Daily    dextrose 10% bolus 250 mL 250 mL Once    fluconazole tablet 200 mg Daily    gabapentin capsule 100 mg BID    glucagon (human recombinant) injection 1 mg PRN    glucose chewable tablet 16 g PRN    glucose chewable tablet 24 g PRN    hydrALAZINE tablet 100 mg Q8H    HYDROmorphone injection 0.5 mg Q6H PRN    insulin aspart U-100 pen 0-5 Units QID (AC + HS) PRN    insulin aspart U-100 pen 5 Units TIDWM    insulin detemir U-100 (Levemir) pen 16 Units QHS    miconazole NITRATE 2 % top powder BID    multivitamin tablet Daily    pantoprazole EC tablet 40 mg Daily    sodium chloride 0.9% flush 10 mL PRN    sodium zirconium cyclosilicate packet 10 g TID    tamsulosin 24 hr capsule 0.4 mg Daily       Objective:     Vital Signs (Most Recent):  Temp: 98.4 °F (36.9 °C) (10/13/23 1141)  Pulse: (!) 52 (10/13/23 1232)  Resp: 18 (10/13/23 1141)  BP: (!) 141/65 (10/13/23 1141)  SpO2: 98 % (10/13/23 1141) Vital Signs (24h Range):  Temp:  [98 °F (36.7 °C)-99 °F (37.2 °C)] 98.4 °F (36.9 °C)  Pulse:  [48-91] 52  Resp:  [11-21] 18  SpO2:  [94 %-100 %] 98 %  BP: (133-203)/(63-91) 141/65     Weight: 112.5 kg (248 lb) (10/03/23 1006)  Body mass index is 36.62 kg/m².  Body surface area is 2.34 meters squared.    I/O last 3 completed shifts:  In: 400 [IV Piggyback:400]  Out: 250 [Urine:250]     Physical  Exam  Vitals and nursing note reviewed.   Constitutional:       General: He is not in acute distress.     Appearance: Normal appearance. He is well-developed. He is not ill-appearing or diaphoretic.   HENT:      Head: Normocephalic and atraumatic.   Eyes:      Pupils: Pupils are equal, round, and reactive to light.   Cardiovascular:      Rate and Rhythm: Normal rate and regular rhythm.      Heart sounds: Normal heart sounds. No murmur heard.  Pulmonary:      Effort: Pulmonary effort is normal. No respiratory distress.      Breath sounds: Normal breath sounds. No stridor.   Abdominal:      General: Bowel sounds are normal. There is no distension.   Musculoskeletal:         General: No deformity. Normal range of motion.      Cervical back: Normal range of motion and neck supple.   Skin:     General: Skin is warm and dry.      Findings: Lesion present. No erythema or rash.      Comments: RIJ line in place c/d/I  Picc line removed    Bilateral heel wounds dressed. Refer to media    Neurological:      Mental Status: He is alert and oriented to person, place, and time.   Psychiatric:         Mood and Affect: Mood normal.         Behavior: Behavior normal.         Thought Content: Thought content normal.         Judgment: Judgment normal.          Significant Labs:  All labs within the past 24 hours have been reviewed.     Significant Imaging:  Labs: Reviewed

## 2023-10-13 NOTE — NURSING
Orders placed for a potassium shift. Patient received sodium zironium at 0952 with no K recheck. Notified LIGIA Ying MD. Advised to give patient calcium gluconate now and to recheck K level prior to proceeding with K shift.

## 2023-10-13 NOTE — PLAN OF CARE
Problem: Adult Inpatient Plan of Care  Goal: Plan of Care Review  Outcome: Ongoing, Not Progressing  Goal: Optimal Comfort and Wellbeing  Outcome: Ongoing, Not Progressing     Problem: Infection  Goal: Absence of Infection Signs and Symptoms  Outcome: Ongoing, Not Progressing     Problem: Impaired Wound Healing  Goal: Optimal Wound Healing  Outcome: Ongoing, Not Progressing     AAOx4. Potassium 5.4 sodium zirconium in place. Continues iv abx, drsg to LLE, intact. No complaints voiced.

## 2023-10-13 NOTE — SUBJECTIVE & OBJECTIVE
Interval History:  Potassium this morning was 5.4, Lokelma dose increased to 10 mg t.i.d..  Patient has no active complaints.  No hematoma at groin site.    tReview of Systems   Constitutional:  Negative for chills and fever.   Neurological:  Negative for seizures and syncope.     Objective:     Vital Signs (Most Recent):  Temp: 99 °F (37.2 °C) (10/13/23 1612)  Pulse: (!) 53 (10/13/23 1612)  Resp: 18 (10/13/23 1612)  BP: (!) 163/98 (10/13/23 1612)  SpO2: (!) 94 % (10/13/23 1612) Vital Signs (24h Range):  Temp:  [98.4 °F (36.9 °C)-99 °F (37.2 °C)] 99 °F (37.2 °C)  Pulse:  [48-85] 53  Resp:  [18-21] 18  SpO2:  [94 %-98 %] 94 %  BP: (133-173)/(63-98) 163/98     Weight: 112.5 kg (248 lb)  Body mass index is 36.62 kg/m².    Intake/Output Summary (Last 24 hours) at 10/13/2023 1857  Last data filed at 10/13/2023 1310  Gross per 24 hour   Intake 480 ml   Output --   Net 480 ml           Physical Exam  Vitals and nursing note reviewed.   Constitutional:       General: He is not in acute distress.     Appearance: He is well-developed. He is obese. He is not diaphoretic.   Musculoskeletal:      Right lower leg: Edema present.      Left lower leg: Edema present.   Skin:     General: Skin is warm and dry.      Coloration: Skin is not jaundiced or pale.   Neurological:      Mental Status: He is alert and oriented to person, place, and time. Mental status is at baseline.      Motor: No seizure activity.   Psychiatric:         Attention and Perception: Attention normal.         Mood and Affect: Affect normal.         Behavior: Behavior is not aggressive or combative.             Significant Labs: All pertinent labs within the past 24 hours have been reviewed.  Recent Labs   Lab 10/12/23  0901 10/12/23  1941 10/13/23  0334    142 138   K 6.2* 5.3*  5.3* 5.4*    111* 108   CO2 21* 22* 22*   BUN 57* 55* 51*   CREATININE 4.2* 4.0* 3.9*   CALCIUM 8.3* 8.9 8.8           Significant Imaging: I have reviewed all pertinent  imaging results/findings within the past 24 hours.

## 2023-10-13 NOTE — NURSING
Current K level is 5.3. Advised to administer ordered sodium zironium; K shift discontinued at this time per H. MD Stepan.

## 2023-10-13 NOTE — ASSESSMENT & PLAN NOTE
Continue home clopidrogel and atorvastatin. Evaluated by Interventional Cardiology in May and deemed not to be a revascularization candidate due to extensive disease, multiple comorbidities, debility/bed bound status and concern for noncompliance.

## 2023-10-13 NOTE — SUBJECTIVE & OBJECTIVE
Interval History: NAEO. No new complaints. S/p micra placement.    Objective:     Vital Signs (Most Recent):  Temp: 98.4 °F (36.9 °C) (10/13/23 1141)  Pulse: (!) 52 (10/13/23 1232)  Resp: 18 (10/13/23 1141)  BP: (!) 141/65 (10/13/23 1141)  SpO2: 98 % (10/13/23 1141) Vital Signs (24h Range):  Temp:  [98 °F (36.7 °C)-99 °F (37.2 °C)] 98.4 °F (36.9 °C)  Pulse:  [48-91] 52  Resp:  [11-21] 18  SpO2:  [94 %-100 %] 98 %  BP: (133-203)/(63-91) 141/65     Weight: 112.5 kg (248 lb)  Body mass index is 36.62 kg/m².     SpO2: 98 %        Physical Exam  Vitals reviewed.   Constitutional:       Appearance: He is well-developed.   HENT:      Head: Normocephalic and atraumatic.   Eyes:      Conjunctiva/sclera: Conjunctivae normal.      Pupils: Pupils are equal, round, and reactive to light.   Neck:      Vascular: Normal carotid pulses. No carotid bruit or JVD.   Cardiovascular:      Rate and Rhythm: Normal rate and regular rhythm.      Heart sounds: No murmur heard.     No friction rub. No gallop.      Comments: No groin hematoma  Pulmonary:      Effort: Pulmonary effort is normal. No respiratory distress.   Abdominal:      General: There is no distension.      Palpations: Abdomen is soft.      Tenderness: There is no abdominal tenderness.   Musculoskeletal:      Cervical back: Normal range of motion and neck supple.   Skin:     General: Skin is warm and dry.      Nails: There is no clubbing.   Neurological:      Mental Status: He is alert and oriented to person, place, and time.   Psychiatric:         Behavior: Behavior normal.            Significant Labs: All pertinent lab results from the last 24 hours have been reviewed.

## 2023-10-13 NOTE — PROGRESS NOTES
[facility] ([place] [id number])  Cardiac Electrophysiology  Progress Note    Admission Date: 10/2/2023  Code Status: Full Code   Attending Physician: Yakelin Ying MD   Expected Discharge Date: 10/15/2023  Principal Problem:Bacteremia due to Gram-negative bacteria    Subjective:     Interval History: NAEO. No new complaints. S/p micra placement.    Objective:     Vital Signs (Most Recent):  Temp: 98.4 °F (36.9 °C) (10/13/23 1141)  Pulse: (!) 52 (10/13/23 1232)  Resp: 18 (10/13/23 1141)  BP: (!) 141/65 (10/13/23 1141)  SpO2: 98 % (10/13/23 1141) Vital Signs (24h Range):  Temp:  [98 °F (36.7 °C)-99 °F (37.2 °C)] 98.4 °F (36.9 °C)  Pulse:  [48-91] 52  Resp:  [11-21] 18  SpO2:  [94 %-100 %] 98 %  BP: (133-203)/(63-91) 141/65     Weight: 112.5 kg (248 lb)  Body mass index is 36.62 kg/m².     SpO2: 98 %        Physical Exam  Vitals reviewed.   Constitutional:       Appearance: He is well-developed.   HENT:      Head: Normocephalic and atraumatic.   Eyes:      Conjunctiva/sclera: Conjunctivae normal.      Pupils: Pupils are equal, round, and reactive to light.   Neck:      Vascular: Normal carotid pulses. No carotid bruit or JVD.   Cardiovascular:      Rate and Rhythm: Normal rate and regular rhythm.      Heart sounds: No murmur heard.     No friction rub. No gallop.      Comments: No groin hematoma  Pulmonary:      Effort: Pulmonary effort is normal. No respiratory distress.   Abdominal:      General: There is no distension.      Palpations: Abdomen is soft.      Tenderness: There is no abdominal tenderness.   Musculoskeletal:      Cervical back: Normal range of motion and neck supple.   Skin:     General: Skin is warm and dry.      Nails: There is no clubbing.   Neurological:      Mental Status: He is alert and oriented to person, place, and time.   Psychiatric:         Behavior: Behavior normal.            Significant Labs: All pertinent lab results from the last 24 hours have been  reviewed.        Assessment and Plan:     History of complete heart block  S/p leadless pacemaker  No groin hematoma  EP will sign off        Mason Lawson MD  Cardiac Electrophysiology  [facility] - Intensive [facility] ([place])

## 2023-10-13 NOTE — PT/OT/SLP PROGRESS
Physical Therapy Co-Treatment    Patient Name:  Saji Castañeda   MRN:  [id number]    Recommendations:     Discharge Recommendations: nursing facility, skilled  Discharge Equipment Recommendations: to be determined by next level of care  Barriers to discharge: None    Co-treatment performed to appropriately and safely address patient's strength and endurance deficits while facilitating functional tasks in addition to accommodating for patient's activity/pain tolerance.    Assessment:     Saji Castañeda is a 73 y.o. male admitted with a medical diagnosis of Bacteremia due to Gram-negative bacteria.  He presents with the following impairments/functional limitations: weakness, impaired endurance, impaired balance, decreased lower extremity function, impaired skin, impaired functional mobility, orthopedic precautions. Pt with improved mobility during today's session. Pt transferred from bed>w/c with removable arm rests using scoot pivot technique with CGA. Pt will continue to benefit from continued t/f training to maximize independence.     Rehab Prognosis: Good; patient would benefit from acute skilled PT services to address these deficits and reach maximum level of function.    Recent Surgery: Procedure(s) (LRB):  [id number] (N/A) 1 Day Post-Op    Plan:     During this hospitalization, patient to be seen 3 x/week to address the identified rehab impairments via gait training, therapeutic activities, therapeutic exercises, neuromuscular re-education and progress toward the following goals:    Plan of Care Expires:  11/02/23    Subjective     Chief Complaint: None stated  Patient/Family Comments/goals: To get to chair  Pain/Comfort:  Pain Rating 1: 0/10      Objective:     Communicated with nurse prior to session.  Patient found supine with Condom Catheter, telemetry upon PT entry to room.     General Precautions: Standard, fall, contact  Orthopedic Precautions: RLE partial weight bearing, LLE non weight  bearing (RLE WBAT through heel with Darco)  Braces:  (darco)  Respiratory Status: Room air     Functional Mobility:  Bed Mobility:     Supine to Sit: stand by assistance, increased time required  Scooting to R along EOB: SBA  Transfers:     Bed to w/c: contact guard assistance with  no AD  using  Scoot Pivot  Cueing for technique  T/f to chair with removable arm rests 2/2 pt unable to clear arm rests at this time      AM-PAC 6 CLICK MOBILITY  Turning over in bed (including adjusting bedclothes, sheets and blankets)?: 3  Sitting down on and standing up from a chair with arms (e.g., wheelchair, bedside commode, etc.): 2  Moving from lying on back to sitting on the side of the bed?: 3  Moving to and from a bed to a chair (including a wheelchair)?: 3  Need to walk in hospital room?: 1  Climbing 3-5 steps with a railing?: 1  Basic Mobility Total Score: 13       Treatment & Education:  Patient educated on goals of session and benefits of mobilizing.   Patient educated on importance of sitting up in chair.  Discussed PT plan of care during hospitalization.   All questions answered within PT scope of practice.     Patient left up in chair with all lines intact, call button in reach, and nurse notified..    GOALS:   Multidisciplinary Problems       Physical Therapy Goals          Problem: Physical Therapy    Goal Priority Disciplines Outcome Goal Variances Interventions   Physical Therapy Goal     PT, PT/OT Ongoing, Progressing     Description: Goals to be met by: 2023    Patient will increase functional independence with mobility by performin. Supine to sit with Daniels- met 10/9  2. Bed to chair transfer with Contact Guard Assistance using LRAD - met 10/9  2a. Chair to bed transfer with SBA using LRAD including slideboard - not met  3. Wheelchair propulsion x100 feet with Supervision not met  4. Lower extremity exercise program x10 reps per handout, with independence - not met                         Time  Tracking:     PT Received On: 10/13/23  PT Start Time: 1119     PT Stop Time: 1137  PT Total Time (min): 18 min     Billable Minutes: Therapeutic Activity 18    Treatment Type: Treatment  PT/PTA: PT     Number of PTA visits since last PT visit: 0     10/13/2023

## 2023-10-13 NOTE — PROGRESS NOTES
[facility] ([place] [address])  Infectious Disease  Progress Note    Patient Name: Saji Castañeda  MRN: [id number]  Admission Date: 10/2/2023  Length of Stay: 11 days  Attending Physician: Yakelin Ying MD  Primary Care Provider: [facility] -    Isolation Status: Contact  Assessment/Plan:      ID  * Bacteremia due to Gram-negative bacteria  73-year-old male with history of afib, T2DM, PVD s/p left foot amputation and chronic OM on IV abx (linezolid, cefepime, metronidzole) at NH presented with weakness found to have sepsis secondary to ESBL Klebsiella and  Acinetobacter bacteremia. likely source includes PICC line - also consider from left heel wound. PICC line removed 10/4/2023. Repeat blood cultures NGTD.     Cefidericol was added to blood culture report from 10/2, awaiting results of sensitivity report. Contacted micro lab and stated sensitivy report would return in 7d. Wound culture + C. Albicans and achromobacter xylosoxidans.     Vascular surgery and podiatry were following, stating no surgical intervention planned, stating wound appears stable, and pt has healing potential. Left foot xray cf osteo of posterior calcaneous. Left foot wound culture + c. Albicans and achromobacter xylosoxidians.       To note, pt has followed with wound care who recommended ED visit in early September which pt declined. Ended up presenting to  ED on 9/13 with cf sepsis 2/2 bilateral BLE wounds. Pt not amendable for amputation per previous notes. Was discharged with 6 weeks of linezolid, cefepime, and flagyl x6 weeks for osteo of left stump, EOC ~10/25/23. Pt now s/p pacemaker placement on 10/12, no complications. Doing well. Planing to return to nursing home to complete IV therapy.       Recommendations:  Continue cefidericol 1g IV q8hr (CrCl 18- renally dosed) and fluconazole 200mg qd   Will follow for cefidericol sensitivity report from outpatient. Pending from micro lab.   No further vascular  surgical plans at this time. Stating pt has ability to heal wounds. Recommend to continue frequent wound care, off loading, and nutritional support.   Pt has history recurrent wound infections cb osteomyelitis with MDRO in the past. He will continue to develop resistance with ongoing antibiotic exposures, and d/t his multiple open wounds, he is at increased risk for recurrent infections. Wound consider surgical intervention for optimal chance of curing his infection.   Plan to continue IV therapy until original end of care date for osteo (10/25/23), this will cover bacteremia as well   Plan reviewed with ID staff. ID will sign off. See OPAT below.      Outpatient Antibiotic Therapy Plan:    Please send referral to [facility].    1) Infection: klebsiella pneumoniae ESBL/acinetobacter  bacteremia, cleared 10/6 & c,. Albicans and achromobacter from left foot wound (underlying osteo).     2) Discharge Antibiotics:    Intravenous antibiotics:  cefiderocol 1g IV q8hr      Oral antibiotics:  Fluconazole 200 mg PO qd      3) Therapy Duration:  2 weeks from cleared culture (10/20/23) for bacteremia and until 10/25/23 for previous left leg osteomyelitis     Estimated end date of IV antibiotics: 10/25/2023    4) Outpatient Weekly Labs:    Order the following labs to be drawn on Mondays:   CBC  CMP   CRP      5) Fax Lab Results to Infectious Diseases Provider: ANIYAH Desir FNP-C    [facility] Fax Number: [phone]    6) Outpatient Infectious Diseases Follow-up    Follow-up appointment will be arranged by the ID clinic and will be found in the patient's appointments tab.    Prior to discharge, please ensure the patient's follow-up has been scheduled.    If there is still no follow-up scheduled prior to discharge, please send an EPIC message to Jennifer Sapp in Infectious Diseases.                      Thank you for your consult. I will sign off. Please contact us if you  have any additional questions.    Dolly Gastelum NP  Infectious Disease  [facility] - Intensive Care ([place] [address])    Subjective:     Principal Problem:Bacteremia due to Gram-negative bacteria    HPI: 73-year-old male with history of afib, T2DM, PVD s/p left foot amputation, chronic osteomyelitis, presented from nursing home with weakness. Patient found to have mobitz type 1 AV block, admitted to CCU. Patient started on dobutamine IV. ID consulted for management of osteomyelitis of left foot. He was being treated with linezolid, cefepime, metronidazole at his nursing home. He had a left PICC line. Patient found to have Acinetobacter baumanii bacteremia. Patient reported feeling better compared to admission, denied having any pain.    Interval History:     Blood cultures +ESBL Klebsiella and  Acinetobacter, repeats on 10/6 NGTD.   Wound cultures + C. Albicans and achromobacter xylosoxidans  S/p pacemaker placement on 10/12, doing well. afebrile. No acute complaints or concerns       Review of Systems   Constitutional:  Negative for chills, diaphoresis, fatigue and fever.   HENT: Negative.     Eyes: Negative.    Respiratory:  Negative for cough and shortness of breath.    Cardiovascular:  Negative for chest pain, palpitations and leg swelling.   Gastrointestinal:  Negative for abdominal pain, diarrhea, nausea and vomiting.   Musculoskeletal:  Negative for arthralgias, back pain and neck pain.   Skin:  Positive for wound. Negative for rash.   Neurological:  Negative for weakness, numbness and headaches.   Psychiatric/Behavioral:  Negative for agitation.      Objective:     Vital Signs (Most Recent):  Temp: 98.4 °F (36.9 °C) (10/13/23 1141)  Pulse: (!) 52 (10/13/23 1232)  Resp: 18 (10/13/23 1141)  BP: (!) 141/65 (10/13/23 1141)  SpO2: 98 % (10/13/23 1141) Vital Signs (24h Range):  Temp:  [98 °F (36.7 °C)-99 °F (37.2 °C)] 98.4 °F (36.9 °C)  Pulse:  [48-91] 52  Resp:  [11-21] 18  SpO2:  [94 %-100 %] 98 %  BP:  (133-203)/(63-91) 141/65     Weight: 112.5 kg (248 lb)  Body mass index is 36.62 kg/m².    Estimated Creatinine Clearance: 20.5 mL/min (A) (based on SCr of 3.9 mg/dL (H)).     Physical Exam  Vitals and nursing note reviewed.   Constitutional:       General: He is not in acute distress.     Appearance: Normal appearance. He is well-developed. He is not ill-appearing or diaphoretic.   HENT:      Head: Normocephalic and atraumatic.   Eyes:      Pupils: Pupils are equal, round, and reactive to light.   Cardiovascular:      Rate and Rhythm: Normal rate and regular rhythm.      Heart sounds: Normal heart sounds. No murmur heard.  Pulmonary:      Effort: Pulmonary effort is normal. No respiratory distress.      Breath sounds: Normal breath sounds. No stridor.   Abdominal:      General: Bowel sounds are normal. There is no distension.   Musculoskeletal:         General: No deformity. Normal range of motion.      Cervical back: Normal range of motion and neck supple.   Skin:     General: Skin is warm and dry.      Findings: Lesion present. No erythema or rash.      Comments: RIJ line in place c/d/I  Picc line removed    Bilateral heel wounds dressed. Refer to media    Neurological:      Mental Status: He is alert and oriented to person, place, and time.   Psychiatric:         Mood and Affect: Mood normal.         Behavior: Behavior normal.         Thought Content: Thought content normal.         Judgment: Judgment normal.                        Significant Labs: All pertinent labs within the past 24 hours have been reviewed.    Significant Imaging: I have reviewed all pertinent imaging results/findings within the past 24 hours.

## 2023-10-13 NOTE — PROGRESS NOTES
[facility] ([place] [address])  Nephrology  Progress Note    Patient Name: Saji Castañeda  MRN: [id number]  Admission Date: 10/2/2023  Hospital Length of Stay: 11 days  Attending Provider: Yakelin Ying MD   Primary Care Physician: [facility] -  Principal Problem:Bacteremia due to Gram-negative bacteria    Subjective:     HPI: 73 year old Black man with hypertension, hyperlipidemia, diabetes mellitus type 2 with neuropathy, left ventricular concentric hypertrophy, paroxysmal atrial fibrillation (anticoagulated on apixaban), Mobitz type I atrioventricular heart block, chronic kidney disease stage 3, chronic venous hypertension, peripheral vascular disease, 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 Patient presnt[facility] to [facility] with complaints of fatigue. He was found to be in complete heart block and hypotensive, Additionally noted to have acute osteomyelitis of the posterior aspect of the calcaneus. He had hypotension concerning for septic shock. He was started on dobutamine, vancomycin, and piperacillin-tazobactam. Blood culture grew Acinetobacter baumannii and Klebsiella pneumoniae. Hosptial course complicated by YNES, for which nephrology was consulted for.       Interval History:       No acute events overnight. Patient underwent pace maker placement yesterday. Doing well this AM without acute complaints. Scr down to 3.9.     Review of patient's allergies indicates:  No Known Allergies  Current Facility-Administered Medications   Medication Frequency    acetaminophen tablet 650 mg Q6H    acetaminophen tablet 650 mg Q4H PRN    ammonium lactate 12 % lotion BID PRN    apixaban tablet 5 mg BID    ascorbic acid (vitamin C) tablet 500 mg BID    atorvastatin tablet 80 mg QHS    bisacodyL  EC tablet 5 mg Daily PRN    cefiderocoL 1 g in dextrose 5 % (D5W) 100 mL infusion Q8H    clopidogreL tablet 75 mg Daily    dextrose 10% bolus 250 mL 250 mL Once    fluconazole tablet 200 mg Daily    gabapentin capsule 100 mg BID    glucagon (human recombinant) injection 1 mg PRN    glucose chewable tablet 16 g PRN    glucose chewable tablet 24 g PRN    hydrALAZINE tablet 100 mg Q8H    HYDROmorphone injection 0.5 mg Q6H PRN    insulin aspart U-100 pen 0-5 Units QID (AC + HS) PRN    insulin aspart U-100 pen 5 Units TIDWM    insulin detemir U-100 (Levemir) pen 16 Units QHS    miconazole NITRATE 2 % top powder BID    multivitamin tablet Daily    pantoprazole EC tablet 40 mg Daily    sodium chloride 0.9% flush 10 mL PRN    sodium zirconium cyclosilicate packet 10 g TID    tamsulosin 24 hr capsule 0.4 mg Daily       Objective:     Vital Signs (Most Recent):  Temp: 98.4 °F (36.9 °C) (10/13/23 1141)  Pulse: (!) 52 (10/13/23 1232)  Resp: 18 (10/13/23 1141)  BP: (!) 141/65 (10/13/23 1141)  SpO2: 98 % (10/13/23 1141) Vital Signs (24h Range):  Temp:  [98 °F (36.7 °C)-99 °F (37.2 °C)] 98.4 °F (36.9 °C)  Pulse:  [48-91] 52  Resp:  [11-21] 18  SpO2:  [94 %-100 %] 98 %  BP: (133-203)/(63-91) 141/65     Weight: 112.5 kg (248 lb) (10/03/23 1006)  Body mass index is 36.62 kg/m².  Body surface area is 2.34 meters squared.    I/O last 3 completed shifts:  In: 400 [IV Piggyback:400]  Out: 250 [Urine:250]     Physical Exam  Vitals and nursing note reviewed.   Constitutional:       General: He is not in acute distress.     Appearance: Normal appearance. He is well-developed. He is not ill-appearing or diaphoretic.   HENT:      Head: Normocephalic and atraumatic.   Eyes:      Pupils: Pupils are equal, round, and reactive to light.   Cardiovascular:      Rate and Rhythm: Normal rate and regular rhythm.      Heart sounds: Normal heart sounds. No murmur heard.  Pulmonary:      Effort: Pulmonary effort is normal. No respiratory distress.       "Breath sounds: Normal breath sounds. No stridor.   Abdominal:      General: Bowel sounds are normal. There is no distension.   Musculoskeletal:         General: No deformity. Normal range of motion.      Cervical back: Normal range of motion and neck supple.   Skin:     General: Skin is warm and dry.      Findings: Lesion present. No erythema or rash.      Comments: RIJ line in place c/d/I  Picc line removed    Bilateral heel wounds dressed. Refer to media    Neurological:      Mental Status: He is alert and oriented to person, place, and time.   Psychiatric:         Mood and Affect: Mood normal.         Behavior: Behavior normal.         Thought Content: Thought content normal.         Judgment: Judgment normal.          Significant Labs:  All labs within the past 24 hours have been reviewed.     Significant Imaging:  Labs: Reviewed    Assessment/Plan:     Cardiac/Vascular  History of complete heart block  Per primary team.     Peripheral arterial disease  Per primary team.     Renal/  Acute on chronic renal failure  Non Oliguric YNES on baseline CKD III with sub nephrotic range protienuira (1.15g).   - baselien Scr ~1-1.4; Scr at time of consultation ~4.5  - UA with 2+ protein; Trace blood; 2 RBC.   - Renal US "The right kidney measures 12.1 cm.; The left kidney measures 12.8 cm.; MRD"   - 10/11/23 Urine Micro "Bacteria noted, no overt casts, some granular sediment"  - Suspect YNES from ischemic ATN from hypoperfusion in setting of shock (on dobutamine) and symptomatic bradycardia. Additional insult from Sepsis (bacteremia) and Vancomycin toxicity (trough ~27 on 10/4).   - Scr 4.5-->4.3-->4.1-->3.9  today.   Plan:  - supportive care  - Avoid drastic changes in BP  - strict ins and outs   - Renally dose all medications  - avoid nephrotoxic agents    ID  Chronic osteomyelitis  Per primary team.     Orthopedic  Nonhealing ulcer of right lower leg with fat layer exposed  Per primary team.         Thank you for your " consult. I will follow-up with patient. Please contact us if you have any additional questions.     Case discussed with attending. Attestation to follow.       Terrell Mcdonnell DO  Nephrology  [facility] ([place] [address])    ATTENDING PHYSICIAN ATTESTATION  I have personally verified the history and examined the patient. I thoroughly reviewed the demographic, clinical, laboratorial and imaging information available in medical records. I agree with the assessment and recommendations provided by the subspecialty resident who was under my supervision.

## 2023-10-13 NOTE — ASSESSMENT & PLAN NOTE
Patient with acute kidney injury/acute renal failure likely due to pre-renal azotemia due to dehydration YNES is currently stable. Baseline creatinine 2 - Labs reviewed- Renal function/electrolytes with Estimated Creatinine Clearance: 20 mL/min (A) (based on SCr of 4 mg/dL (H)). according to latest data. Monitor urine output and serial BMP and adjust therapy as needed. Avoid nephrotoxins and renally dose meds for GFR listed above.

## 2023-10-13 NOTE — PT/OT/SLP PROGRESS
"Speech Language Pathology Treatment    Patient Name:  Saji Castañeda   MRN:  [id number]  Admitting Diagnosis: Bacteremia due to Gram-negative bacteria    Recommendations:                 General Recommendations:  Cognitive-linguistic therapy  Diet recommendations:  Soft & Bite Sized Diet - IDDSI Level 6, Liquid Diet Level: Thin liquids - IDDSI Level 0   Aspiration Precautions: HOB to 90 degrees and Standard aspiration precautions   General Precautions: Standard, fall  Communication strategies:  none    Assessment:     Saji Castañeda is a 73 y.o. male with an SLP diagnosis of mild Cognitive-Linguistic Impairment.      Subjective     "I was at a nursing home and I got sick."     Pain/Comfort:  Pain Rating 1: 0/10    Respiratory Status: Room air    Objective:     Has the patient been evaluated by SLP for swallowing?   No  Keep patient NPO? No   Current Respiratory Status:        Pt seen for ongoing cognitive-linguistic tx. Pt was O x 4.  Pt recalled 3/3 unrelated words after a 2 minute delay with 100% accuracy IND. Pt completed simple money calculation tasks with 100% accuracy.  Pt compared 2 given items with 100% accuracy and contrasted them with 80% accuracy IND/100% given cues.  During word fluency tasks, pt listed 9 fruits given min cues and 8 animals given min cues within one minute per category. Cont POC.     Goals:   Multidisciplinary Problems       SLP Goals          Problem: SLP    Goal Priority Disciplines Outcome   SLP Goal     SLP    Description: Speech Language Pathology Goals  Goals expected to be met by 10/12    1. Pt will recall 3 novel items after 2 minute delay given mod cues.  2. Pt will complete immediate memory tasks with 70% accuracy given min cues.  3. Pt will follow 3-step commands with 70% accuracy given mod cues.  4. Pt will list 7 items from a category in 1 minute given min cues.   5. Pt will participate in assessment for reading, writing, and visuospatial abilities.                         "       Plan:     Patient to be seen:  3 x/week   Plan of Care expires:  11/02/23  Plan of Care reviewed with:  patient   SLP Follow-Up:  Yes       Discharge recommendations:  nursing facility, skilled     Time Tracking:     SLP Treatment Date:   10/13/23  Speech Start Time:  1411  Speech Stop Time:  1423     Speech Total Time (min):  12 min    Billable Minutes: Speech Therapy Individual (cognitive therapy) 12    10/13/2023

## 2023-10-13 NOTE — PLAN OF CARE
[facility] ([facility])  Discharge Reassessment    Primary Care Provider: [facility] -    Expected Discharge Date: 10/15/2023    Reassessment (most recent)       Discharge Reassessment - 10/13/23 1604          Discharge Reassessment    Assessment Type Discharge Planning Reassessment (P)      Did the patient's condition or plan change since previous assessment? No (P)      Discharge Plan discussed with: Patient (P)      Communicated OXANA with patient/caregiver Yes (P)      Discharge Plan A Home Health (P)      Discharge Plan B Home with family (P)      Transition of Care Barriers None (P)      Why the patient remains in the hospital Requires continued medical care (P)         Post-Acute Status    Home Health Status Pending medical clearance/testing (P)      Coverage [employer] MANAGED MEDICARE - [employer]NA [facility] HMO PPO SPECIAL NEEDS (P)                                      AMAYA Toledo, LMSW  [facility]  Case Management  Ext. [phone]

## 2023-10-14 LAB
ALBUMIN SERPL BCP-MCNC: 2.3 G/DL (ref 3.5–5.2)
ANION GAP SERPL CALC-SCNC: 12 MMOL/L (ref 8–16)
BUN SERPL-MCNC: 47 MG/DL (ref 8–23)
CALCIUM SERPL-MCNC: 8.4 MG/DL (ref 8.7–10.5)
CHLORIDE SERPL-SCNC: 107 MMOL/L (ref 95–110)
CO2 SERPL-SCNC: 21 MMOL/L (ref 23–29)
CREAT SERPL-MCNC: 3.1 MG/DL (ref 0.5–1.4)
ERYTHROCYTE [DISTWIDTH] IN BLOOD BY AUTOMATED COUNT: 19.4 % (ref 11.5–14.5)
EST. GFR  (NO RACE VARIABLE): 20.3 ML/MIN/1.73 M^2
GLUCOSE SERPL-MCNC: 111 MG/DL (ref 70–110)
HCT VFR BLD AUTO: 25.8 % (ref 40–54)
HGB BLD-MCNC: 8 G/DL (ref 14–18)
MCH RBC QN AUTO: 27.5 PG (ref 27–31)
MCHC RBC AUTO-ENTMCNC: 31 G/DL (ref 32–36)
MCV RBC AUTO: 89 FL (ref 82–98)
PHOSPHATE SERPL-MCNC: 3.5 MG/DL (ref 2.7–4.5)
PLATELET # BLD AUTO: 261 K/UL (ref 150–450)
PMV BLD AUTO: 10.7 FL (ref 9.2–12.9)
POCT GLUCOSE: 159 MG/DL (ref 70–110)
POCT GLUCOSE: 189 MG/DL (ref 70–110)
POCT GLUCOSE: 194 MG/DL (ref 70–110)
POCT GLUCOSE: 218 MG/DL (ref 70–110)
POTASSIUM SERPL-SCNC: 4.7 MMOL/L (ref 3.5–5.1)
RBC # BLD AUTO: 2.91 M/UL (ref 4.6–6.2)
SODIUM SERPL-SCNC: 140 MMOL/L (ref 136–145)
WBC # BLD AUTO: 8.05 K/UL (ref 3.9–12.7)

## 2023-10-14 PROCEDURE — 25000003 PHARM REV CODE 250: Performed by: STUDENT IN AN ORGANIZED HEALTH CARE EDUCATION/TRAINING PROGRAM

## 2023-10-14 PROCEDURE — 21400001 HC TELEMETRY ROOM

## 2023-10-14 PROCEDURE — 25000003 PHARM REV CODE 250: Performed by: INTERNAL MEDICINE

## 2023-10-14 PROCEDURE — 25000003 PHARM REV CODE 250

## 2023-10-14 PROCEDURE — 36415 COLL VENOUS BLD VENIPUNCTURE: CPT | Performed by: STUDENT IN AN ORGANIZED HEALTH CARE EDUCATION/TRAINING PROGRAM

## 2023-10-14 PROCEDURE — 85027 COMPLETE CBC AUTOMATED: CPT | Performed by: STUDENT IN AN ORGANIZED HEALTH CARE EDUCATION/TRAINING PROGRAM

## 2023-10-14 PROCEDURE — 63600175 PHARM REV CODE 636 W HCPCS: Mod: JZ,TB | Performed by: PHYSICIAN ASSISTANT

## 2023-10-14 PROCEDURE — 25000003 PHARM REV CODE 250: Performed by: PHYSICIAN ASSISTANT

## 2023-10-14 PROCEDURE — 27000207 HC ISOLATION

## 2023-10-14 PROCEDURE — 25000003 PHARM REV CODE 250: Performed by: HOSPITALIST

## 2023-10-14 PROCEDURE — 99233 PR SUBSEQUENT HOSPITAL CARE,LEVL III: ICD-10-PCS | Mod: ,,, | Performed by: STUDENT IN AN ORGANIZED HEALTH CARE EDUCATION/TRAINING PROGRAM

## 2023-10-14 PROCEDURE — 80069 RENAL FUNCTION PANEL: CPT | Performed by: STUDENT IN AN ORGANIZED HEALTH CARE EDUCATION/TRAINING PROGRAM

## 2023-10-14 PROCEDURE — 99233 SBSQ HOSP IP/OBS HIGH 50: CPT | Mod: ,,, | Performed by: STUDENT IN AN ORGANIZED HEALTH CARE EDUCATION/TRAINING PROGRAM

## 2023-10-14 RX ADMIN — ACETAMINOPHEN 650 MG: 325 TABLET ORAL at 05:10

## 2023-10-14 RX ADMIN — MICONAZOLE NITRATE: 20 POWDER TOPICAL at 09:10

## 2023-10-14 RX ADMIN — INSULIN ASPART 5 UNITS: 100 INJECTION, SOLUTION INTRAVENOUS; SUBCUTANEOUS at 05:10

## 2023-10-14 RX ADMIN — TAMSULOSIN HYDROCHLORIDE 0.4 MG: 0.4 CAPSULE ORAL at 09:10

## 2023-10-14 RX ADMIN — CEFIDEROCOL SULFATE TOSYLATE 1 G: 1 INJECTION, POWDER, FOR SOLUTION INTRAVENOUS at 05:10

## 2023-10-14 RX ADMIN — APIXABAN 5 MG: 5 TABLET, FILM COATED ORAL at 09:10

## 2023-10-14 RX ADMIN — MICONAZOLE NITRATE: 20 POWDER TOPICAL at 12:10

## 2023-10-14 RX ADMIN — SODIUM ZIRCONIUM CYCLOSILICATE 10 G: 10 POWDER, FOR SUSPENSION ORAL at 03:10

## 2023-10-14 RX ADMIN — OXYCODONE HYDROCHLORIDE AND ACETAMINOPHEN 500 MG: 500 TABLET ORAL at 09:10

## 2023-10-14 RX ADMIN — CEFIDEROCOL SULFATE TOSYLATE 1 G: 1 INJECTION, POWDER, FOR SOLUTION INTRAVENOUS at 09:10

## 2023-10-14 RX ADMIN — HYDRALAZINE HYDROCHLORIDE 100 MG: 50 TABLET ORAL at 09:10

## 2023-10-14 RX ADMIN — CLOPIDOGREL BISULFATE 75 MG: 75 TABLET ORAL at 09:10

## 2023-10-14 RX ADMIN — ACETAMINOPHEN 650 MG: 325 TABLET ORAL at 11:10

## 2023-10-14 RX ADMIN — INSULIN ASPART 5 UNITS: 100 INJECTION, SOLUTION INTRAVENOUS; SUBCUTANEOUS at 09:10

## 2023-10-14 RX ADMIN — THERA TABS 1 TABLET: TAB at 12:10

## 2023-10-14 RX ADMIN — INSULIN ASPART 5 UNITS: 100 INJECTION, SOLUTION INTRAVENOUS; SUBCUTANEOUS at 12:10

## 2023-10-14 RX ADMIN — CEFIDEROCOL SULFATE TOSYLATE 1 G: 1 INJECTION, POWDER, FOR SOLUTION INTRAVENOUS at 12:10

## 2023-10-14 RX ADMIN — HYDRALAZINE HYDROCHLORIDE 100 MG: 50 TABLET ORAL at 05:10

## 2023-10-14 RX ADMIN — HYDRALAZINE HYDROCHLORIDE 100 MG: 50 TABLET ORAL at 03:10

## 2023-10-14 RX ADMIN — SODIUM ZIRCONIUM CYCLOSILICATE 10 G: 10 POWDER, FOR SUSPENSION ORAL at 09:10

## 2023-10-14 RX ADMIN — FLUCONAZOLE 200 MG: 200 TABLET ORAL at 09:10

## 2023-10-14 RX ADMIN — ATORVASTATIN CALCIUM 80 MG: 40 TABLET, FILM COATED ORAL at 09:10

## 2023-10-14 RX ADMIN — GABAPENTIN 100 MG: 300 CAPSULE ORAL at 09:10

## 2023-10-14 RX ADMIN — PANTOPRAZOLE SODIUM 40 MG: 40 TABLET, DELAYED RELEASE ORAL at 09:10

## 2023-10-14 RX ADMIN — ACETAMINOPHEN 650 MG: 325 TABLET ORAL at 12:10

## 2023-10-14 NOTE — PROGRESS NOTES
[facility] - Intensive Care ([address] Medicine  Progress Note    Patient Name: Saji Castañeda  MRN: [id number]  Patient Class: IP- Inpatient   Admission Date: 10/2/2023  Length of Stay: 12 days  Attending Physician: Yakelin Ying MD  Primary Care Provider: [facility] -        Subjective:     Principal Problem:Bacteremia due to Gram-negative bacteria        HPI:  Sjai Castañeda is a 73 year old Black man with hypertension, hyperlipidemia, diabetes mellitus type 2 with neuropathy, left ventricular concentric hypertrophy, paroxysmal atrial fibrillation (anticoagulated on apixaban), Mobitz type I atrioventricular heart block, chronic kidney disease stage 3, chronic venous hypertension, peripheral vascular disease, 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 He lives in [place], [place].               He presented to [facility] on 10/2/2023 from [facility] due to concerns of weakness and feeling like he was going to die. He reported no dizziness or headaches. He knew his name, where he was, and the current year but had trouble remembering which nursing home he came from. Blood glucose was in the 20s. He was given dextrose in the emergency department and glucose improved to 96 mg/dL. Labs showed acute kidney injury with creatinine of 2.6 mg/dL from baseline of 1.3 and lactic acid elevated at 2.29 mmol/L. He had complete heart block. He was admitted to Cardiology.      Overview/Hospital Course:  Saji Castañeda is a 73 year old Black man with hypertension, hyperlipidemia, diabetes mellitus type 2 with neuropathy, left ventricular concentric hypertrophy, chronic lower extremity osteomyelitis,  presented to [facility] due to concerns of weakness  and feeling like he was going to die. He had complete heart block. He was admitted to Cardiology.  However patient's blood pressure dropped significantly and was transferred to ICU for sepsis.  With PICC line verses acute osteomyelitis of the posterior aspect of the calcaneus as a possible source.. He was started on dobutamine, vancomycin, and piperacillin-tazobactam. Blood culture grew Acinetobacter baumannii and Klebsiella pneumoniae.  He had acute on chronic renal failure.  Repeat EKG showed 2nd degree AV block replacing junctional rhythm. Electrophysiology recommended pacemaker placement. Vascular Surgery suspected poor wound healing to be due to severe edema. They ordered ankle brachial indices but it was unable to be done due to the hardness of his skin. They did not recommend amputation based on their physical exam findings.  Infectious Disease changed piperacillin-tazobactam to cefidericol and fluconazole. He continued to have asymptomatic bradycardia. Foot wound culture grew Achromobacter xylosoxidans dentrificans and Candida albicans. Lower extremity arterial ultrasound suggested moderate stenosis of the right anterior tibial artery and severe stenosis of the right posterior tibial artery. Vascular Surgery reviewed this and felt it would not hinder his ability to heal. Electrophysiology reviewed his EKG on 10/8/2023 and noted 2nd degree heart block Mobitz I. Renal function did not improve. Fractional excretion of sodium was 1.5% suggesting intrinsic cause. Urinalysis showed pyuria and bacteriuria, similar to 2 other urinalyses done since admission, but urine culture again had no growth. Nephrology was consulted for persistent acute kidney injury and suspected ischemic acute tubular necrosis from hypoperfusion in setting of shock (on dobutamine) and symptomatic bradycardia with additional insult from sepsis (bacteremia) and vancomycin toxicity (trough ~27 on 10/4/2023).  Patient underwent pacemaker  placement on 10/12.  Tolerated procedure well.  No postop complications      Interval History:  Patient is comfortably resting on bed.  No overnight events.  Kidney function improving.  Creatinine down trended to 3.1.    tReview of Systems   Constitutional:  Negative for chills and fever.   Neurological:  Negative for seizures and syncope.     Objective:     Vital Signs (Most Recent):  Temp: 99.1 °F (37.3 °C) (10/14/23 1201)  Pulse: 75 (10/14/23 1449)  Resp: 20 (10/14/23 1201)  BP: (!) 155/75 (10/14/23 1201)  SpO2: 95 % (10/14/23 1201) Vital Signs (24h Range):  Temp:  [98.1 °F (36.7 °C)-99.1 °F (37.3 °C)] 99.1 °F (37.3 °C)  Pulse:  [50-87] 75  Resp:  [18-20] 20  SpO2:  [94 %-98 %] 95 %  BP: (144-182)/(72-98) 155/75     Weight: 112.5 kg (248 lb)  Body mass index is 36.62 kg/m².    Intake/Output Summary (Last 24 hours) at 10/14/2023 1507  Last data filed at 10/14/2023 0819  Gross per 24 hour   Intake --   Output 1075 ml   Net -1075 ml           Physical Exam  Vitals and nursing note reviewed.   Constitutional:       General: He is not in acute distress.     Appearance: He is well-developed. He is obese. He is not diaphoretic.   Musculoskeletal:      Right lower leg: Edema present.      Left lower leg: Edema present.   Skin:     General: Skin is warm and dry.      Coloration: Skin is not jaundiced or pale.   Neurological:      Mental Status: He is alert and oriented to person, place, and time. Mental status is at baseline.      Motor: No seizure activity.   Psychiatric:         Attention and Perception: Attention normal.         Mood and Affect: Affect normal.         Behavior: Behavior is not aggressive or combative.             Significant Labs: All pertinent labs within the past 24 hours have been reviewed.  Recent Labs   Lab 10/12/23  1941 10/13/23  0334 10/14/23  0244    138 140   K 5.3*  5.3* 5.4* 4.7   * 108 107   CO2 22* 22* 21*   BUN 55* 51* 47*   CREATININE 4.0* 3.9* 3.1*   CALCIUM 8.9 8.8 8.4*            Significant Imaging: I have reviewed all pertinent imaging results/findings within the past 24 hours.        Assessment/Plan:      * Bacteremia due to Gram-negative bacteria   sepsis secondary to ESBL Klebsiella and  Acinetobacter bacteremia. likely source includes PICC line - also consider from left heel wound. PICC line removed 10/4/2023. Repeat blood cultures NGTD.      Cefidericol was added to blood culture report from 10/2, awaiting results of sensitivity report. Wound culture + C. Albicans and achromobacter xylosoxidans.         Recommendations:  1. Continue cefidericol 1g IV q8hr (CrCl 18- renally dosed) and fluconazole 200mg qd   2. No further vascular surgical plans at this time. Stating pt has ability to heal wounds. Recommend to continue frequent wound care, off loading, and nutritional support.    3. Plan to continue IV therapy until original end of care date for osteo (10/25/23),    Hyperkalemia  Resolved   K-6.2 on 10/12, (most likely hemolyzed sample )  1 dose of Lokelma was given prior to the procedure. 1 dose of calcium gluconate given and EKG was ordered, hyperkalemia per plan was initiated.   However Repeat potassium was 5.3. orders were discontinued.  We will only continue Lokelma for now        Cardiogenic shock        Complete heart block        Asymptomatic bacteriuria  Each urine culture has pyuria and bacteriuria. Urine culture on 10/2/2023 had no growth. Urine culture on 10/4/2023 grew multiple organisms with none in predominance. Urine culture on 10/9/2023 had no growth.     Blurred vision, left eye  Reports this to be chronic. Follow up with an ophthalmologist.    History of complete heart block  AV block, 2nd degree  Pacemaker placed on 10/12,  no postop complication    Paroxysmal atrial fibrillation  Continue home apixaban.     AV block, 2nd degree  Pacemaker placed on 10/12.    Chronic osteomyelitis        Nonhealing ulcer of right lower leg with fat layer exposed  Edema  of leg  Vascular Surgery believes edema contributes to poor wound healing. They did not recommend amputation. They recommended compression wrappings. Consulted Wound Care for this. Already on medical management for PAD with clopidrogel, apixaban, atorvastatin.     Chronic deep vein thrombosis (DVT) of right upper extremity  Continue home apixaban.    Peripheral arterial disease  Continue home clopidrogel and atorvastatin.  Evaluated by vascular surgery recommended that patient was not a revascularization candidate due to extensive disease, multiple comorbidities, debility/bed bound status and concern for noncompliance.    Acute on chronic renal failure  On admission, has worsened since. Monitor labs. FENa 1.5%. Occasional eosinophils. No obstruction on renal ultrasound. Appreciate Nephrology, who believe it is multifactorial.    YNES (acute kidney injury)  Patient with acute kidney injury/acute renal failure likely due to pre-renal azotemia due to dehydration YNES is currently improving. Baseline creatinine 2 - Labs reviewed- Renal function/electrolytes with Estimated Creatinine Clearance: 25.8 mL/min (A) (based on SCr of 3.1 mg/dL (H)). according to latest data. Monitor urine output and serial BMP and adjust therapy as needed. Avoid nephrotoxins and renally dose meds for GFR listed above.    Insulin dependent type 2 diabetes mellitus  Holding home glipizide. He takes insulin glargine 45 units HS and insulin aspart 12 units TID. Giving insulin detemir 25 units HS, insulin aspart 5 units TID and sliding scale. Monitor Accuchecks.    Edema of leg          VTE Risk Mitigation (From admission, onward)         Ordered     apixaban tablet 5 mg  2 times daily         10/11/23 0932     IP VTE HIGH RISK PATIENT  Once         10/02/23 1513     Place sequential compression device  Until discontinued         10/02/23 1513                Discharge Planning   OXANA: 10/15/2023     Code Status: Full Code   Is the patient medically  ready for discharge?: No    Reason for patient still in hospital (select all that apply): Treatment and Pending disposition  Discharge Plan A: Home Health                  Yakelin Ying MD  Department of Hospital Medicine   [facility] - Intensive Care ([place]-16)

## 2023-10-14 NOTE — ASSESSMENT & PLAN NOTE
Patient with acute kidney injury/acute renal failure likely due to pre-renal azotemia due to dehydration YNES is currently stable. Baseline creatinine 2 - Labs reviewed- Renal function/electrolytes with Estimated Creatinine Clearance: 20.5 mL/min (A) (based on SCr of 3.9 mg/dL (H)). according to latest data. Monitor urine output and serial BMP and adjust therapy as needed. Avoid nephrotoxins and renally dose meds for GFR listed above.

## 2023-10-14 NOTE — ASSESSMENT & PLAN NOTE
Resolved   K-6.2 on 10/12, (most likely hemolyzed sample )  1 dose of Lokelma was given prior to the procedure. 1 dose of calcium gluconate given and EKG was ordered, hyperkalemia per plan was initiated.   However Repeat potassium was 5.3. orders were discontinued.  We will only continue Lokelma for now

## 2023-10-14 NOTE — ASSESSMENT & PLAN NOTE
Continue home clopidrogel and atorvastatin.  Evaluated by vascular surgery recommended that patient was not a revascularization candidate due to extensive disease, multiple comorbidities, debility/bed bound status and concern for noncompliance.

## 2023-10-14 NOTE — PROGRESS NOTES
[facility] - Intensive Care ([address] Medicine  Progress Note    Patient Name: Saji Castañeda  MRN: [id number]  Patient Class: IP- Inpatient   Admission Date: 10/2/2023  Length of Stay: 11 days  Attending Physician: Yakelin Ying MD  Primary Care Provider: [facility] -        Subjective:     Principal Problem:Bacteremia due to Gram-negative bacteria        HPI:  Saji Castañeda is a 73 year old Black man with hypertension, hyperlipidemia, diabetes mellitus type 2 with neuropathy, left ventricular concentric hypertrophy, paroxysmal atrial fibrillation (anticoagulated on apixaban), Mobitz type I atrioventricular heart block, chronic kidney disease stage 3, chronic venous hypertension, peripheral vascular disease, 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 He lives in [place], [place].               He presented to [facility] on 10/2/2023 from [facility] due to concerns of weakness and feeling like he was going to die. He reported no dizziness or headaches. He knew his name, where he was, and the current year but had trouble remembering which nursing home he came from. Blood glucose was in the 20s. He was given dextrose in the emergency department and glucose improved to 96 mg/dL. Labs showed acute kidney injury with creatinine of 2.6 mg/dL from baseline of 1.3 and lactic acid elevated at 2.29 mmol/L. He had complete heart block. He was admitted to Cardiology.      Overview/Hospital Course:  Right internal jugular central venous catheter was placed. He was found to have acute osteomyelitis of the posterior aspect of the calcaneus. He had hypotension concerning for septic shock. He was started on dobutamine, vancomycin, and piperacillin-tazobactam.  Blood culture grew Acinetobacter baumannii and Klebsiella pneumoniae. He had aphasia. Head CT and brain MRI showed no acute stroke. Infectious Disease was consulted as he had been on linezolid, cefepime, and metronidazole for osteomyelitis. PICC line was removed and IV access was replaced in anticipation for long-term IV antibiotics. Podiatry was consulted and recommended X-rays and Wound Care consult. Wound was cultured. He had acute on chronic renal failure. Renal ultrasound showed no obstruction. He was given fluid resuscitation. Heart rate was able to augment appropriately with exertion with transmission of P waves. Repeat EKG showed 2nd degree AV block replacing junctional rhythm. Electrophysiology recommended pacemaker placement. Vascular Surgery suspected poor wound healing to be due to severe edema. They ordered ankle brachial indices but it was unable to be done due to the hardness of his skin. They did not recommend amputation based on their physical exam findings. He was transferred to Hospital Medicine Team D on 10/5/2023. Infectious Disease changed piperacillin-tazobactam to cefidericol and fluconazole. He continued to have asymptomatic bradycardia. Foot wound culture grew Achromobacter xylosoxidans dentrificans and Candida albicans. Lower extremity arterial ultrasound suggested moderate stenosis of the right anterior tibial artery and severe stenosis of the right posterior tibial artery. Vascular Surgery reviewed this and felt it would not hinder his ability to heal. Electrophysiology reviewed his EKG on 10/8/2023 and noted 2nd degree heart block Mobitz I. Renal function did not improve. Fractional excretion of sodium was 1.5% suggesting intrinsic cause. Urinalysis showed pyuria and bacteriuria, similar to 2 other urinalyses done since admission, but urine culture again had no growth. Nephrology was consulted for persistent acute kidney injury and suspected ischemic acute tubular necrosis from  hypoperfusion in setting of shock (on dobutamine) and symptomatic bradycardia with additional insult from sepsis (bacteremia) and vancomycin toxicity (trough ~27 on 10/4/2023). Wound Care recommended diabetic sock or light sock compression rather than compression wrappings, but noted that his wound looked good.       Interval History:  Potassium this morning was 5.4, Lokelma dose increased to 10 mg t.i.d..  Patient has no active complaints.  No hematoma at groin site.    tReview of Systems   Constitutional:  Negative for chills and fever.   Neurological:  Negative for seizures and syncope.     Objective:     Vital Signs (Most Recent):  Temp: 99 °F (37.2 °C) (10/13/23 1612)  Pulse: (!) 53 (10/13/23 1612)  Resp: 18 (10/13/23 1612)  BP: (!) 163/98 (10/13/23 1612)  SpO2: (!) 94 % (10/13/23 1612) Vital Signs (24h Range):  Temp:  [98.4 °F (36.9 °C)-99 °F (37.2 °C)] 99 °F (37.2 °C)  Pulse:  [48-85] 53  Resp:  [18-21] 18  SpO2:  [94 %-98 %] 94 %  BP: (133-173)/(63-98) 163/98     Weight: 112.5 kg (248 lb)  Body mass index is 36.62 kg/m².    Intake/Output Summary (Last 24 hours) at 10/13/2023 1857  Last data filed at 10/13/2023 1310  Gross per 24 hour   Intake 480 ml   Output --   Net 480 ml           Physical Exam  Vitals and nursing note reviewed.   Constitutional:       General: He is not in acute distress.     Appearance: He is well-developed. He is obese. He is not diaphoretic.   Musculoskeletal:      Right lower leg: Edema present.      Left lower leg: Edema present.   Skin:     General: Skin is warm and dry.      Coloration: Skin is not jaundiced or pale.   Neurological:      Mental Status: He is alert and oriented to person, place, and time. Mental status is at baseline.      Motor: No seizure activity.   Psychiatric:         Attention and Perception: Attention normal.         Mood and Affect: Affect normal.         Behavior: Behavior is not aggressive or combative.             Significant Labs: All pertinent labs  within the past 24 hours have been reviewed.  Recent Labs   Lab 10/12/23  0901 10/12/23  1941 10/13/23  0334    142 138   K 6.2* 5.3*  5.3* 5.4*    111* 108   CO2 21* 22* 22*   BUN 57* 55* 51*   CREATININE 4.2* 4.0* 3.9*   CALCIUM 8.3* 8.9 8.8           Significant Imaging: I have reviewed all pertinent imaging results/findings within the past 24 hours.        Assessment/Plan:      * Bacteremia due to Gram-negative bacteria   sepsis secondary to ESBL Klebsiella and  Acinetobacter bacteremia. likely source includes PICC line - also consider from left heel wound. PICC line removed 10/4/2023. Repeat blood cultures NGTD.      Cefidericol was added to blood culture report from 10/2, awaiting results of sensitivity report. Wound culture + C. Albicans and achromobacter xylosoxidans.         Recommendations:  1. Continue cefidericol 1g IV q8hr (CrCl 18- renally dosed) and fluconazole 200mg qd   2. No further vascular surgical plans at this time. Stating pt has ability to heal wounds. Recommend to continue frequent wound care, off loading, and nutritional support.    3. Plan to continue IV therapy until original end of care date for osteo (10/25/23),    Hyperkalemia  K-6.2 this am, (most likely hemolyzed sample )  1 dose of Lokelma was given prior to the procedure. 1 dose of calcium gluconate given and EKG was ordered, hyperkalemia per plan was initiated.   However Repeat potassium was 5.3. orders were discontinued.  We will only continue Lokelma for now        Cardiogenic shock        Complete heart block        Asymptomatic bacteriuria  Each urine culture has pyuria and bacteriuria. Urine culture on 10/2/2023 had no growth. Urine culture on 10/4/2023 grew multiple organisms with none in predominance. Urine culture on 10/9/2023 had no growth.     Blurred vision, left eye  Reports this to be chronic. Follow up with an ophthalmologist.    History of complete heart block  AV block, 2nd degree  Pacemaker placed  on 10/12,  no postop complication    Paroxysmal atrial fibrillation  Continue home apixaban.     AV block, 2nd degree  Pacemaker placed on 10/12.    Chronic osteomyelitis        Nonhealing ulcer of right lower leg with fat layer exposed  Edema of leg  Vascular Surgery believes edema contributes to poor wound healing. They did not recommend amputation. They recommended compression wrappings. Consulted Wound Care for this. Already on medical management for PAD with clopidrogel, apixaban, atorvastatin.     Chronic deep vein thrombosis (DVT) of right upper extremity  Continue home apixaban.    Peripheral arterial disease  Continue home clopidrogel and atorvastatin. Evaluated by Interventional Cardiology in May and deemed not to be a revascularization candidate due to extensive disease, multiple comorbidities, debility/bed bound status and concern for noncompliance.    Acute on chronic renal failure  On admission, has worsened since. Monitor labs. FENa 1.5%. Occasional eosinophils. No obstruction on renal ultrasound. Appreciate Nephrology, who believe it is multifactorial.    YNES (acute kidney injury)  Patient with acute kidney injury/acute renal failure likely due to pre-renal azotemia due to dehydration YNES is currently stable. Baseline creatinine 2 - Labs reviewed- Renal function/electrolytes with Estimated Creatinine Clearance: 20.5 mL/min (A) (based on SCr of 3.9 mg/dL (H)). according to latest data. Monitor urine output and serial BMP and adjust therapy as needed. Avoid nephrotoxins and renally dose meds for GFR listed above.    Insulin dependent type 2 diabetes mellitus  Holding home glipizide. He takes insulin glargine 45 units HS and insulin aspart 12 units TID. Giving insulin detemir 25 units HS, insulin aspart 5 units TID and sliding scale. Monitor Accuchecks.    Edema of leg          VTE Risk Mitigation (From admission, onward)         Ordered     apixaban tablet 5 mg  2 times daily         10/11/23 6107      IP VTE HIGH RISK PATIENT  Once         10/02/23 1513     Place sequential compression device  Until discontinued         10/02/23 1513                Discharge Planning   OXANA: 10/15/2023     Code Status: Full Code   Is the patient medically ready for discharge?: No    Reason for patient still in hospital (select all that apply): Patient trending condition and Treatment  Discharge Plan A: Home Health                  Yakelin Ying MD  Department of Hospital Medicine   [facility] - Intensive Care ([address] [place])

## 2023-10-14 NOTE — ASSESSMENT & PLAN NOTE
sepsis secondary to ESBL Klebsiella and  Acinetobacter bacteremia. likely source includes PICC line - also consider from left heel wound. PICC line removed 10/4/2023. Repeat blood cultures NGTD.      Cefidericol was added to blood culture report from 10/2, awaiting results of sensitivity report. Wound culture + C. Albicans and achromobacter xylosoxidans.         Recommendations:  1. Continue cefidericol 1g IV q8hr (CrCl 18- renally dosed) and fluconazole 200mg qd   2. No further vascular surgical plans at this time. Stating pt has ability to heal wounds. Recommend to continue frequent wound care, off loading, and nutritional support.    3. Plan to continue IV therapy until original end of care date for osteo (10/25/23),

## 2023-10-14 NOTE — SUBJECTIVE & OBJECTIVE
Interval History:  Patient is comfortably resting on bed.  No overnight events.  Kidney function improving.  Creatinine down trended to 3.1.    tReview of Systems   Constitutional:  Negative for chills and fever.   Neurological:  Negative for seizures and syncope.     Objective:     Vital Signs (Most Recent):  Temp: 99.1 °F (37.3 °C) (10/14/23 1201)  Pulse: 75 (10/14/23 1449)  Resp: 20 (10/14/23 1201)  BP: (!) 155/75 (10/14/23 1201)  SpO2: 95 % (10/14/23 1201) Vital Signs (24h Range):  Temp:  [98.1 °F (36.7 °C)-99.1 °F (37.3 °C)] 99.1 °F (37.3 °C)  Pulse:  [50-87] 75  Resp:  [18-20] 20  SpO2:  [94 %-98 %] 95 %  BP: (144-182)/(72-98) 155/75     Weight: 112.5 kg (248 lb)  Body mass index is 36.62 kg/m².    Intake/Output Summary (Last 24 hours) at 10/14/2023 1507  Last data filed at 10/14/2023 0819  Gross per 24 hour   Intake --   Output 1075 ml   Net -1075 ml           Physical Exam  Vitals and nursing note reviewed.   Constitutional:       General: He is not in acute distress.     Appearance: He is well-developed. He is obese. He is not diaphoretic.   Musculoskeletal:      Right lower leg: Edema present.      Left lower leg: Edema present.   Skin:     General: Skin is warm and dry.      Coloration: Skin is not jaundiced or pale.   Neurological:      Mental Status: He is alert and oriented to person, place, and time. Mental status is at baseline.      Motor: No seizure activity.   Psychiatric:         Attention and Perception: Attention normal.         Mood and Affect: Affect normal.         Behavior: Behavior is not aggressive or combative.             Significant Labs: All pertinent labs within the past 24 hours have been reviewed.  Recent Labs   Lab 10/12/23  1941 10/13/23  0334 10/14/23  0244    138 140   K 5.3*  5.3* 5.4* 4.7   * 108 107   CO2 22* 22* 21*   BUN 55* 51* 47*   CREATININE 4.0* 3.9* 3.1*   CALCIUM 8.9 8.8 8.4*           Significant Imaging: I have reviewed all pertinent imaging  results/findings within the past 24 hours.

## 2023-10-14 NOTE — PLAN OF CARE
Problem: Adult Inpatient Plan of Care  Goal: Plan of Care Review  10/14/2023 0559 by Alexis Dial RN  Outcome: Ongoing, Progressing     Problem: Diabetes Comorbidity  Goal: Blood Glucose Level Within Targeted Range  10/14/2023 0559 by Alexis Dial RN  Outcome: Ongoing, Progressing  10/14/2023 0536 by Alexis Dial RN  Outcome: Ongoing, Progressing     Problem: Infection  Goal: Absence of Infection Signs and Symptoms  Outcome: Ongoing, Progressing     Problem: Impaired Wound Healing  Goal: Optimal Wound Healing  Outcome: Ongoing, Progressing     Problem: Fluid and Electrolyte Imbalance (Acute Kidney Injury/Impairment)  Goal: Fluid and Electrolyte Balance  Outcome: Ongoing, Progressing     Problem: Renal Function Impairment (Acute Kidney Injury/Impairment)  Goal: Effective Renal Function  10/14/2023 0559 by Alexis Dial RN  Outcome: Ongoing, Progressing  10/14/2023 0536 by Alexis Dial RN  Outcome: Ongoing, Progressing     Problem: Fall Injury Risk  Goal: Absence of Fall and Fall-Related Injury  Outcome: Ongoing, Progressing

## 2023-10-14 NOTE — ASSESSMENT & PLAN NOTE
Patient with acute kidney injury/acute renal failure likely due to pre-renal azotemia due to dehydration YNES is currently improving. Baseline creatinine 2 - Labs reviewed- Renal function/electrolytes with Estimated Creatinine Clearance: 25.8 mL/min (A) (based on SCr of 3.1 mg/dL (H)). according to latest data. Monitor urine output and serial BMP and adjust therapy as needed. Avoid nephrotoxins and renally dose meds for GFR listed above.

## 2023-10-15 LAB
ALBUMIN SERPL BCP-MCNC: 2.1 G/DL (ref 3.5–5.2)
ANION GAP SERPL CALC-SCNC: 9 MMOL/L (ref 8–16)
BUN SERPL-MCNC: 39 MG/DL (ref 8–23)
CALCIUM SERPL-MCNC: 8.2 MG/DL (ref 8.7–10.5)
CHLORIDE SERPL-SCNC: 111 MMOL/L (ref 95–110)
CO2 SERPL-SCNC: 22 MMOL/L (ref 23–29)
CREAT SERPL-MCNC: 3 MG/DL (ref 0.5–1.4)
ERYTHROCYTE [DISTWIDTH] IN BLOOD BY AUTOMATED COUNT: 19.2 % (ref 11.5–14.5)
EST. GFR  (NO RACE VARIABLE): 21.1 ML/MIN/1.73 M^2
GLUCOSE SERPL-MCNC: 147 MG/DL (ref 70–110)
HCT VFR BLD AUTO: 24 % (ref 40–54)
HGB BLD-MCNC: 7.5 G/DL (ref 14–18)
MCH RBC QN AUTO: 27.6 PG (ref 27–31)
MCHC RBC AUTO-ENTMCNC: 31.3 G/DL (ref 32–36)
MCV RBC AUTO: 88 FL (ref 82–98)
PHOSPHATE SERPL-MCNC: 3.5 MG/DL (ref 2.7–4.5)
PLATELET # BLD AUTO: 253 K/UL (ref 150–450)
PMV BLD AUTO: 10.9 FL (ref 9.2–12.9)
POCT GLUCOSE: 148 MG/DL (ref 70–110)
POCT GLUCOSE: 149 MG/DL (ref 70–110)
POCT GLUCOSE: 154 MG/DL (ref 70–110)
POCT GLUCOSE: 165 MG/DL (ref 70–110)
POTASSIUM SERPL-SCNC: 4.4 MMOL/L (ref 3.5–5.1)
RBC # BLD AUTO: 2.72 M/UL (ref 4.6–6.2)
SODIUM SERPL-SCNC: 142 MMOL/L (ref 136–145)
WBC # BLD AUTO: 5.9 K/UL (ref 3.9–12.7)

## 2023-10-15 PROCEDURE — 63600175 PHARM REV CODE 636 W HCPCS: Mod: JZ,TB | Performed by: PHYSICIAN ASSISTANT

## 2023-10-15 PROCEDURE — 25000003 PHARM REV CODE 250

## 2023-10-15 PROCEDURE — 25000003 PHARM REV CODE 250: Performed by: PHYSICIAN ASSISTANT

## 2023-10-15 PROCEDURE — 21400001 HC TELEMETRY ROOM

## 2023-10-15 PROCEDURE — 85027 COMPLETE CBC AUTOMATED: CPT | Performed by: STUDENT IN AN ORGANIZED HEALTH CARE EDUCATION/TRAINING PROGRAM

## 2023-10-15 PROCEDURE — 99233 SBSQ HOSP IP/OBS HIGH 50: CPT | Mod: ,,, | Performed by: STUDENT IN AN ORGANIZED HEALTH CARE EDUCATION/TRAINING PROGRAM

## 2023-10-15 PROCEDURE — 36415 COLL VENOUS BLD VENIPUNCTURE: CPT | Performed by: STUDENT IN AN ORGANIZED HEALTH CARE EDUCATION/TRAINING PROGRAM

## 2023-10-15 PROCEDURE — 25000003 PHARM REV CODE 250: Performed by: STUDENT IN AN ORGANIZED HEALTH CARE EDUCATION/TRAINING PROGRAM

## 2023-10-15 PROCEDURE — 80069 RENAL FUNCTION PANEL: CPT | Performed by: STUDENT IN AN ORGANIZED HEALTH CARE EDUCATION/TRAINING PROGRAM

## 2023-10-15 PROCEDURE — 99233 PR SUBSEQUENT HOSPITAL CARE,LEVL III: ICD-10-PCS | Mod: ,,, | Performed by: STUDENT IN AN ORGANIZED HEALTH CARE EDUCATION/TRAINING PROGRAM

## 2023-10-15 PROCEDURE — 27000207 HC ISOLATION

## 2023-10-15 PROCEDURE — 25000003 PHARM REV CODE 250: Performed by: INTERNAL MEDICINE

## 2023-10-15 PROCEDURE — 25000003 PHARM REV CODE 250: Performed by: HOSPITALIST

## 2023-10-15 RX ADMIN — MICONAZOLE NITRATE: 20 POWDER TOPICAL at 09:10

## 2023-10-15 RX ADMIN — ACETAMINOPHEN 650 MG: 325 TABLET ORAL at 11:10

## 2023-10-15 RX ADMIN — OXYCODONE HYDROCHLORIDE AND ACETAMINOPHEN 500 MG: 500 TABLET ORAL at 09:10

## 2023-10-15 RX ADMIN — APIXABAN 5 MG: 5 TABLET, FILM COATED ORAL at 09:10

## 2023-10-15 RX ADMIN — CLOPIDOGREL BISULFATE 75 MG: 75 TABLET ORAL at 09:10

## 2023-10-15 RX ADMIN — SODIUM ZIRCONIUM CYCLOSILICATE 10 G: 10 POWDER, FOR SUSPENSION ORAL at 09:10

## 2023-10-15 RX ADMIN — ACETAMINOPHEN 650 MG: 325 TABLET ORAL at 06:10

## 2023-10-15 RX ADMIN — FLUCONAZOLE 200 MG: 200 TABLET ORAL at 09:10

## 2023-10-15 RX ADMIN — INSULIN ASPART 5 UNITS: 100 INJECTION, SOLUTION INTRAVENOUS; SUBCUTANEOUS at 09:10

## 2023-10-15 RX ADMIN — GABAPENTIN 100 MG: 300 CAPSULE ORAL at 09:10

## 2023-10-15 RX ADMIN — ACETAMINOPHEN 650 MG: 325 TABLET ORAL at 02:10

## 2023-10-15 RX ADMIN — INSULIN ASPART 5 UNITS: 100 INJECTION, SOLUTION INTRAVENOUS; SUBCUTANEOUS at 06:10

## 2023-10-15 RX ADMIN — HYDRALAZINE HYDROCHLORIDE 100 MG: 50 TABLET ORAL at 05:10

## 2023-10-15 RX ADMIN — CEFIDEROCOL SULFATE TOSYLATE 1 G: 1 INJECTION, POWDER, FOR SOLUTION INTRAVENOUS at 02:10

## 2023-10-15 RX ADMIN — SODIUM ZIRCONIUM CYCLOSILICATE 10 G: 10 POWDER, FOR SUSPENSION ORAL at 02:10

## 2023-10-15 RX ADMIN — HYDRALAZINE HYDROCHLORIDE 100 MG: 50 TABLET ORAL at 02:10

## 2023-10-15 RX ADMIN — ATORVASTATIN CALCIUM 80 MG: 40 TABLET, FILM COATED ORAL at 09:10

## 2023-10-15 RX ADMIN — INSULIN ASPART 5 UNITS: 100 INJECTION, SOLUTION INTRAVENOUS; SUBCUTANEOUS at 02:10

## 2023-10-15 RX ADMIN — CEFIDEROCOL SULFATE TOSYLATE 1 G: 1 INJECTION, POWDER, FOR SOLUTION INTRAVENOUS at 09:10

## 2023-10-15 RX ADMIN — THERA TABS 1 TABLET: TAB at 09:10

## 2023-10-15 RX ADMIN — TAMSULOSIN HYDROCHLORIDE 0.4 MG: 0.4 CAPSULE ORAL at 09:10

## 2023-10-15 RX ADMIN — PANTOPRAZOLE SODIUM 40 MG: 40 TABLET, DELAYED RELEASE ORAL at 09:10

## 2023-10-15 RX ADMIN — CEFIDEROCOL SULFATE TOSYLATE 1 G: 1 INJECTION, POWDER, FOR SOLUTION INTRAVENOUS at 05:10

## 2023-10-15 RX ADMIN — HYDRALAZINE HYDROCHLORIDE 100 MG: 50 TABLET ORAL at 10:10

## 2023-10-15 NOTE — SUBJECTIVE & OBJECTIVE
Interval History:  Patient is comfortably resting on bed.  No overnight events.  Kidney function improving.  Creatinine down trended to 3.0    tReview of Systems   Constitutional:  Negative for chills and fever.   Neurological:  Negative for seizures and syncope.     Objective:     Vital Signs (Most Recent):  Temp: 97.9 °F (36.6 °C) (10/15/23 1600)  Pulse: 91 (10/15/23 1600)  Resp: 18 (10/15/23 1600)  BP: (!) 198/89 (10/15/23 1600)  SpO2: 98 % (10/15/23 1600) Vital Signs (24h Range):  Temp:  [97.2 °F (36.2 °C)-99.2 °F (37.3 °C)] 97.9 °F (36.6 °C)  Pulse:  [76-91] 91  Resp:  [17-18] 18  SpO2:  [94 %-98 %] 98 %  BP: (148-198)/(70-89) 198/89     Weight: 112.5 kg (248 lb)  Body mass index is 36.62 kg/m².    Intake/Output Summary (Last 24 hours) at 10/15/2023 1615  Last data filed at 10/15/2023 1158  Gross per 24 hour   Intake --   Output 700 ml   Net -700 ml           Physical Exam  Vitals and nursing note reviewed.   Constitutional:       General: He is not in acute distress.     Appearance: He is well-developed. He is obese. He is not diaphoretic.   Musculoskeletal:      Right lower leg: Edema present.      Left lower leg: Edema present.   Skin:     General: Skin is warm and dry.      Coloration: Skin is not jaundiced or pale.   Neurological:      Mental Status: He is alert and oriented to person, place, and time. Mental status is at baseline.      Motor: No seizure activity.   Psychiatric:         Attention and Perception: Attention normal.         Mood and Affect: Affect normal.         Behavior: Behavior is not aggressive or combative.             Significant Labs: All pertinent labs within the past 24 hours have been reviewed.  Recent Labs   Lab 10/13/23  0334 10/14/23  0244 10/15/23  0420    140 142   K 5.4* 4.7 4.4    107 111*   CO2 22* 21* 22*   BUN 51* 47* 39*   CREATININE 3.9* 3.1* 3.0*   CALCIUM 8.8 8.4* 8.2*           Significant Imaging: I have reviewed all pertinent imaging results/findings  within the past 24 hours.

## 2023-10-15 NOTE — PROGRESS NOTES
[facility] - Intensive Care ([address] Medicine  Progress Note    Patient Name: Saji Castañeda  MRN: [id number]  Patient Class: IP- Inpatient   Admission Date: 10/2/2023  Length of Stay: 13 days  Attending Physician: Yakelin Ying MD  Primary Care Provider: [facility] -        Subjective:     Principal Problem:Bacteremia due to Gram-negative bacteria        HPI:  Saji Castañeda is a 73 year old Black man with hypertension, hyperlipidemia, diabetes mellitus type 2 with neuropathy, left ventricular concentric hypertrophy, paroxysmal atrial fibrillation (anticoagulated on apixaban), Mobitz type I atrioventricular heart block, chronic kidney disease stage 3, chronic venous hypertension, peripheral vascular disease, 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 He lives in [place], [place].               He presented to [facility] on 10/2/2023 from [facility] due to concerns of weakness and feeling like he was going to die. He reported no dizziness or headaches. He knew his name, where he was, and the current year but had trouble remembering which nursing home he came from. Blood glucose was in the 20s. He was given dextrose in the emergency department and glucose improved to 96 mg/dL. Labs showed acute kidney injury with creatinine of 2.6 mg/dL from baseline of 1.3 and lactic acid elevated at 2.29 mmol/L. He had complete heart block. He was admitted to Cardiology.      Overview/Hospital Course:  Saji Castañeda is a 73 year old Black man with hypertension, hyperlipidemia, diabetes mellitus type 2 with neuropathy, left ventricular concentric hypertrophy, chronic lower extremity osteomyelitis,  presented to [facility] due to concerns of weakness  and feeling like he was going to die. He had type 2 second-degree heart block. He was admitted to Cardiology.  However patient's blood pressure dropped significantly and was transferred to ICU for sepsis.  With PICC line verses acute osteomyelitis of the posterior aspect of the calcaneus as a possible source.. He was started on dobutamine, vancomycin, and piperacillin-tazobactam. Blood culture grew Acinetobacter baumannii and Klebsiella pneumoniae.Vascular Surgery suspected poor wound healing to be due to severe edema. They ordered ankle brachial indices but it was unable to be done due to the hardness of his skin. They did not recommend amputation based on their physical exam findings.  Infectious Disease changed piperacillin-tazobactam to cefidericol and fluconazole. He continued to have asymptomatic bradycardia. Foot wound culture grew Achromobacter xylosoxidans dentrificans and Candida albicans. Lower extremity arterial ultrasound suggested moderate stenosis of the right anterior tibial artery and severe stenosis of the right posterior tibial artery. Vascular Surgery reviewed this and felt it would not hinder his ability to heal.   He had acute on chronic renal failure.  Repeat EKG showed 2nd degree AV block replacing junctional rhythm. Electrophysiology recommended pacemaker placement. Electrophysiology reviewed his EKG on 10/8/2023 and noted 2nd degree heart block Mobitz I. Renal function did not improve. Fractional excretion of sodium was 1.5% suggesting intrinsic cause. Urinalysis showed pyuria and bacteriuria, similar to 2 other urinalyses done since admission, but urine culture again had no growth. Nephrology was consulted for persistent acute kidney injury and suspected ischemic acute tubular necrosis from hypoperfusion in setting of shock (on dobutamine) and symptomatic bradycardia with additional insult from sepsis (bacteremia) and vancomycin toxicity (trough ~27 on 10/4/2023).  Patient underwent  pacemaker placement on 10/12.  Tolerated procedure well.  No postop complications      Interval History:  Patient is comfortably resting on bed.  No overnight events.  Kidney function improving.  Creatinine down trended to 3.0    tReview of Systems   Constitutional:  Negative for chills and fever.   Neurological:  Negative for seizures and syncope.     Objective:     Vital Signs (Most Recent):  Temp: 97.9 °F (36.6 °C) (10/15/23 1600)  Pulse: 91 (10/15/23 1600)  Resp: 18 (10/15/23 1600)  BP: (!) 198/89 (10/15/23 1600)  SpO2: 98 % (10/15/23 1600) Vital Signs (24h Range):  Temp:  [97.2 °F (36.2 °C)-99.2 °F (37.3 °C)] 97.9 °F (36.6 °C)  Pulse:  [76-91] 91  Resp:  [17-18] 18  SpO2:  [94 %-98 %] 98 %  BP: (148-198)/(70-89) 198/89     Weight: 112.5 kg (248 lb)  Body mass index is 36.62 kg/m².    Intake/Output Summary (Last 24 hours) at 10/15/2023 1615  Last data filed at 10/15/2023 1158  Gross per 24 hour   Intake --   Output 700 ml   Net -700 ml           Physical Exam  Vitals and nursing note reviewed.   Constitutional:       General: He is not in acute distress.     Appearance: He is well-developed. He is obese. He is not diaphoretic.   Musculoskeletal:      Right lower leg: Edema present.      Left lower leg: Edema present.   Skin:     General: Skin is warm and dry.      Coloration: Skin is not jaundiced or pale.   Neurological:      Mental Status: He is alert and oriented to person, place, and time. Mental status is at baseline.      Motor: No seizure activity.   Psychiatric:         Attention and Perception: Attention normal.         Mood and Affect: Affect normal.         Behavior: Behavior is not aggressive or combative.             Significant Labs: All pertinent labs within the past 24 hours have been reviewed.  Recent Labs   Lab 10/13/23  0334 10/14/23  0244 10/15/23  0420    140 142   K 5.4* 4.7 4.4    107 111*   CO2 22* 21* 22*   BUN 51* 47* 39*   CREATININE 3.9* 3.1* 3.0*   CALCIUM 8.8 8.4* 8.2*            Significant Imaging: I have reviewed all pertinent imaging results/findings within the past 24 hours.        Assessment/Plan:      * Bacteremia due to Gram-negative bacteria   sepsis secondary to ESBL Klebsiella and  Acinetobacter bacteremia. likely source includes PICC line - also consider from left heel wound. PICC line removed 10/4/2023. Repeat blood cultures NGTD.      Cefidericol was added to blood culture report from 10/2, awaiting results of sensitivity report. Wound culture + C. Albicans and achromobacter xylosoxidans.         Recommendations:  1. Continue cefidericol 1g IV q8hr (CrCl 18- renally dosed) and fluconazole 200mg qd   2. No further vascular surgical plans at this time. Stating pt has ability to heal wounds. Recommend to continue frequent wound care, off loading, and nutritional support.    3. Plan to continue IV therapy until original end of care date for osteo (10/25/23),    Hyperkalemia  Resolved   K-6.2 on 10/12, (most likely hemolyzed sample )  1 dose of Lokelma was given prior to the procedure. 1 dose of calcium gluconate given and EKG was ordered, hyperkalemia per plan was initiated.   However Repeat potassium was 5.3. orders were discontinued.  We will only continue Lokelma for now        Cardiogenic shock        Complete heart block        Asymptomatic bacteriuria  Each urine culture has pyuria and bacteriuria. Urine culture on 10/2/2023 had no growth. Urine culture on 10/4/2023 grew multiple organisms with none in predominance. Urine culture on 10/9/2023 had no growth.     Blurred vision, left eye  Reports this to be chronic. Follow up with an ophthalmologist.    History of complete heart block  AV block, 2nd degree  Pacemaker placed on 10/12,  no postop complication    Paroxysmal atrial fibrillation  Continue home apixaban.     AV block, 2nd degree  Pacemaker placed on 10/12.    Chronic osteomyelitis        Nonhealing ulcer of right lower leg with fat layer exposed  Edema  of leg  Vascular Surgery believes edema contributes to poor wound healing. They did not recommend amputation. They recommended compression wrappings. Consulted Wound Care for this. Already on medical management for PAD with clopidrogel, apixaban, atorvastatin.     Chronic deep vein thrombosis (DVT) of right upper extremity  Continue home apixaban.    Peripheral arterial disease  Continue home clopidrogel and atorvastatin.  Evaluated by vascular surgery recommended that patient was not a revascularization candidate due to extensive disease, multiple comorbidities, debility/bed bound status and concern for noncompliance.    Acute on chronic renal failure  On admission, has worsened since. Monitor labs. FENa 1.5%. Occasional eosinophils. No obstruction on renal ultrasound. Appreciate Nephrology, who believe it is multifactorial.    YNES (acute kidney injury)  Patient with acute kidney injury/acute renal failure likely due to pre-renal azotemia due to dehydration YNES is currently improving. Baseline creatinine 2 - Labs reviewed- Renal function/electrolytes with Estimated Creatinine Clearance: 25.8 mL/min (A) (based on SCr of 3.1 mg/dL (H)). according to latest data. Monitor urine output and serial BMP and adjust therapy as needed. Avoid nephrotoxins and renally dose meds for GFR listed above.    Insulin dependent type 2 diabetes mellitus  Holding home glipizide. He takes insulin glargine 45 units HS and insulin aspart 12 units TID. Giving insulin detemir 25 units HS, insulin aspart 5 units TID and sliding scale. Monitor Accuchecks.    Edema of leg          VTE Risk Mitigation (From admission, onward)         Ordered     apixaban tablet 5 mg  2 times daily         10/11/23 0932     IP VTE HIGH RISK PATIENT  Once         10/02/23 1513     Place sequential compression device  Until discontinued         10/02/23 1513                Discharge Planning   OXANA: 10/15/2023     Code Status: Full Code   Is the patient medically  ready for discharge?: No    Reason for patient still in hospital (select all that apply): Consult recommendations and Pending disposition  Discharge Plan A: Home Health                  Yakelin Ying MD  Department of Hospital Medicine   [facility] - Intensive Care ([place]

## 2023-10-15 NOTE — PLAN OF CARE
Problem: Adult Inpatient Plan of Care  Goal: Plan of Care Review  Outcome: Ongoing, Not Progressing  Goal: Optimal Comfort and Wellbeing  Outcome: Ongoing, Not Progressing     Problem: Infection  Goal: Absence of Infection Signs and Symptoms  Outcome: Ongoing, Not Progressing     Problem: Impaired Wound Healing  Goal: Optimal Wound Healing  Outcome: Ongoing, Not Progressing     AAOx4. Paced rhythm on telemetry monitoring. No complaints voiced. Ost op site, cdi.  HR 79. Iv abx in place.

## 2023-10-16 LAB
ALBUMIN SERPL BCP-MCNC: 2.2 G/DL (ref 3.5–5.2)
ANION GAP SERPL CALC-SCNC: 11 MMOL/L (ref 8–16)
BUN SERPL-MCNC: 38 MG/DL (ref 8–23)
CALCIUM SERPL-MCNC: 8.5 MG/DL (ref 8.7–10.5)
CHLORIDE SERPL-SCNC: 113 MMOL/L (ref 95–110)
CO2 SERPL-SCNC: 22 MMOL/L (ref 23–29)
CREAT SERPL-MCNC: 2.9 MG/DL (ref 0.5–1.4)
ERYTHROCYTE [DISTWIDTH] IN BLOOD BY AUTOMATED COUNT: 19.6 % (ref 11.5–14.5)
EST. GFR  (NO RACE VARIABLE): 22 ML/MIN/1.73 M^2
GLUCOSE SERPL-MCNC: 165 MG/DL (ref 70–110)
HCT VFR BLD AUTO: 24 % (ref 40–54)
HGB BLD-MCNC: 7.4 G/DL (ref 14–18)
MCH RBC QN AUTO: 27.9 PG (ref 27–31)
MCHC RBC AUTO-ENTMCNC: 30.8 G/DL (ref 32–36)
MCV RBC AUTO: 91 FL (ref 82–98)
PHOSPHATE SERPL-MCNC: 3.1 MG/DL (ref 2.7–4.5)
PLATELET # BLD AUTO: 278 K/UL (ref 150–450)
PMV BLD AUTO: 10.7 FL (ref 9.2–12.9)
POCT GLUCOSE: 145 MG/DL (ref 70–110)
POCT GLUCOSE: 161 MG/DL (ref 70–110)
POCT GLUCOSE: 161 MG/DL (ref 70–110)
POCT GLUCOSE: 189 MG/DL (ref 70–110)
POTASSIUM SERPL-SCNC: 4.1 MMOL/L (ref 3.5–5.1)
RBC # BLD AUTO: 2.65 M/UL (ref 4.6–6.2)
SODIUM SERPL-SCNC: 146 MMOL/L (ref 136–145)
WBC # BLD AUTO: 6.76 K/UL (ref 3.9–12.7)

## 2023-10-16 PROCEDURE — 76937 US GUIDE VASCULAR ACCESS: CPT

## 2023-10-16 PROCEDURE — 97535 SELF CARE MNGMENT TRAINING: CPT

## 2023-10-16 PROCEDURE — 99232 SBSQ HOSP IP/OBS MODERATE 35: CPT | Mod: ,,, | Performed by: STUDENT IN AN ORGANIZED HEALTH CARE EDUCATION/TRAINING PROGRAM

## 2023-10-16 PROCEDURE — 25000003 PHARM REV CODE 250: Performed by: STUDENT IN AN ORGANIZED HEALTH CARE EDUCATION/TRAINING PROGRAM

## 2023-10-16 PROCEDURE — 99231 SBSQ HOSP IP/OBS SF/LOW 25: CPT | Mod: ,,, | Performed by: INTERNAL MEDICINE

## 2023-10-16 PROCEDURE — 99231 PR SUBSEQUENT HOSPITAL CARE,LEVL I: ICD-10-PCS | Mod: ,,, | Performed by: INTERNAL MEDICINE

## 2023-10-16 PROCEDURE — 99232 PR SUBSEQUENT HOSPITAL CARE,LEVL II: ICD-10-PCS | Mod: ,,, | Performed by: STUDENT IN AN ORGANIZED HEALTH CARE EDUCATION/TRAINING PROGRAM

## 2023-10-16 PROCEDURE — 97530 THERAPEUTIC ACTIVITIES: CPT

## 2023-10-16 PROCEDURE — A4216 STERILE WATER/SALINE, 10 ML: HCPCS | Performed by: STUDENT IN AN ORGANIZED HEALTH CARE EDUCATION/TRAINING PROGRAM

## 2023-10-16 PROCEDURE — 85027 COMPLETE CBC AUTOMATED: CPT | Performed by: STUDENT IN AN ORGANIZED HEALTH CARE EDUCATION/TRAINING PROGRAM

## 2023-10-16 PROCEDURE — 36410 VNPNXR 3YR/> PHY/QHP DX/THER: CPT

## 2023-10-16 PROCEDURE — 21400001 HC TELEMETRY ROOM

## 2023-10-16 PROCEDURE — 27000207 HC ISOLATION

## 2023-10-16 PROCEDURE — 25000003 PHARM REV CODE 250: Performed by: PHYSICIAN ASSISTANT

## 2023-10-16 PROCEDURE — 97110 THERAPEUTIC EXERCISES: CPT

## 2023-10-16 PROCEDURE — 63600175 PHARM REV CODE 636 W HCPCS: Mod: JZ,TB | Performed by: PHYSICIAN ASSISTANT

## 2023-10-16 PROCEDURE — 25000003 PHARM REV CODE 250

## 2023-10-16 PROCEDURE — 25000003 PHARM REV CODE 250: Performed by: HOSPITALIST

## 2023-10-16 PROCEDURE — 80069 RENAL FUNCTION PANEL: CPT | Performed by: STUDENT IN AN ORGANIZED HEALTH CARE EDUCATION/TRAINING PROGRAM

## 2023-10-16 PROCEDURE — 25000003 PHARM REV CODE 250: Performed by: INTERNAL MEDICINE

## 2023-10-16 PROCEDURE — 36415 COLL VENOUS BLD VENIPUNCTURE: CPT | Performed by: STUDENT IN AN ORGANIZED HEALTH CARE EDUCATION/TRAINING PROGRAM

## 2023-10-16 PROCEDURE — C1751 CATH, INF, PER/CENT/MIDLINE: HCPCS

## 2023-10-16 PROCEDURE — 92507 TX SP LANG VOICE COMM INDIV: CPT

## 2023-10-16 RX ORDER — SODIUM CHLORIDE 0.9 % (FLUSH) 0.9 %
10 SYRINGE (ML) INJECTION
Status: DISCONTINUED | OUTPATIENT
Start: 2023-10-16 | End: 2023-10-19 | Stop reason: HOSPADM

## 2023-10-16 RX ORDER — SODIUM CHLORIDE 0.9 % (FLUSH) 0.9 %
10 SYRINGE (ML) INJECTION EVERY 6 HOURS
Status: DISCONTINUED | OUTPATIENT
Start: 2023-10-16 | End: 2023-10-19 | Stop reason: HOSPADM

## 2023-10-16 RX ORDER — GABAPENTIN 100 MG/1
100 CAPSULE ORAL 2 TIMES DAILY
Qty: 60 CAPSULE | Refills: 1 | Status: SHIPPED | OUTPATIENT
Start: 2023-10-16 | End: 2023-12-15

## 2023-10-16 RX ORDER — FLUCONAZOLE 200 MG/1
200 TABLET ORAL DAILY
Qty: 9 TABLET | Refills: 0 | Status: SHIPPED | OUTPATIENT
Start: 2023-10-17 | End: 2023-10-18 | Stop reason: SDUPTHER

## 2023-10-16 RX ORDER — MICONAZOLE NITRATE 2 %
POWDER (GRAM) TOPICAL 2 TIMES DAILY
Qty: 20 G | Refills: 0 | Status: ON HOLD | OUTPATIENT
Start: 2023-10-16 | End: 2024-03-17 | Stop reason: HOSPADM

## 2023-10-16 RX ORDER — INSULIN ASPART 100 [IU]/ML
6 INJECTION, SOLUTION INTRAVENOUS; SUBCUTANEOUS
Qty: 30 ML | Refills: 3 | Status: ON HOLD | OUTPATIENT
Start: 2023-10-16 | End: 2024-04-03 | Stop reason: HOSPADM

## 2023-10-16 RX ORDER — HYDRALAZINE HYDROCHLORIDE 100 MG/1
100 TABLET, FILM COATED ORAL EVERY 8 HOURS
Qty: 90 TABLET | Refills: 11 | Status: ON HOLD | OUTPATIENT
Start: 2023-10-16 | End: 2024-03-17 | Stop reason: HOSPADM

## 2023-10-16 RX ADMIN — THERA TABS 1 TABLET: TAB at 09:10

## 2023-10-16 RX ADMIN — SODIUM ZIRCONIUM CYCLOSILICATE 10 G: 10 POWDER, FOR SUSPENSION ORAL at 09:10

## 2023-10-16 RX ADMIN — HYDRALAZINE HYDROCHLORIDE 100 MG: 50 TABLET ORAL at 03:10

## 2023-10-16 RX ADMIN — Medication 10 ML: at 12:10

## 2023-10-16 RX ADMIN — OXYCODONE HYDROCHLORIDE AND ACETAMINOPHEN 500 MG: 500 TABLET ORAL at 09:10

## 2023-10-16 RX ADMIN — GABAPENTIN 100 MG: 300 CAPSULE ORAL at 09:10

## 2023-10-16 RX ADMIN — INSULIN ASPART 5 UNITS: 100 INJECTION, SOLUTION INTRAVENOUS; SUBCUTANEOUS at 05:10

## 2023-10-16 RX ADMIN — MICONAZOLE NITRATE: 20 POWDER TOPICAL at 09:10

## 2023-10-16 RX ADMIN — ACETAMINOPHEN 650 MG: 325 TABLET ORAL at 12:10

## 2023-10-16 RX ADMIN — CLOPIDOGREL BISULFATE 75 MG: 75 TABLET ORAL at 09:10

## 2023-10-16 RX ADMIN — CEFIDEROCOL SULFATE TOSYLATE 1 G: 1 INJECTION, POWDER, FOR SOLUTION INTRAVENOUS at 03:10

## 2023-10-16 RX ADMIN — Medication 10 ML: at 09:10

## 2023-10-16 RX ADMIN — INSULIN ASPART 5 UNITS: 100 INJECTION, SOLUTION INTRAVENOUS; SUBCUTANEOUS at 09:10

## 2023-10-16 RX ADMIN — INSULIN ASPART 5 UNITS: 100 INJECTION, SOLUTION INTRAVENOUS; SUBCUTANEOUS at 12:10

## 2023-10-16 RX ADMIN — CEFIDEROCOL SULFATE TOSYLATE 1 G: 1 INJECTION, POWDER, FOR SOLUTION INTRAVENOUS at 07:10

## 2023-10-16 RX ADMIN — ACETAMINOPHEN 650 MG: 325 TABLET ORAL at 06:10

## 2023-10-16 RX ADMIN — APIXABAN 5 MG: 5 TABLET, FILM COATED ORAL at 09:10

## 2023-10-16 RX ADMIN — TAMSULOSIN HYDROCHLORIDE 0.4 MG: 0.4 CAPSULE ORAL at 09:10

## 2023-10-16 RX ADMIN — FLUCONAZOLE 200 MG: 200 TABLET ORAL at 09:10

## 2023-10-16 RX ADMIN — HYDRALAZINE HYDROCHLORIDE 100 MG: 50 TABLET ORAL at 09:10

## 2023-10-16 RX ADMIN — HYDRALAZINE HYDROCHLORIDE 100 MG: 50 TABLET ORAL at 06:10

## 2023-10-16 RX ADMIN — ACETAMINOPHEN 650 MG: 325 TABLET ORAL at 05:10

## 2023-10-16 RX ADMIN — PANTOPRAZOLE SODIUM 40 MG: 40 TABLET, DELAYED RELEASE ORAL at 09:10

## 2023-10-16 RX ADMIN — SODIUM ZIRCONIUM CYCLOSILICATE 10 G: 10 POWDER, FOR SUSPENSION ORAL at 03:10

## 2023-10-16 RX ADMIN — ATORVASTATIN CALCIUM 80 MG: 40 TABLET, FILM COATED ORAL at 09:10

## 2023-10-16 RX ADMIN — CEFIDEROCOL SULFATE TOSYLATE 1 G: 1 INJECTION, POWDER, FOR SOLUTION INTRAVENOUS at 09:10

## 2023-10-16 NOTE — SUBJECTIVE & OBJECTIVE
Interval History:     No acute events overnight. Tolerating diet well, reports feeling thirsty.     Review of patient's allergies indicates:  No Known Allergies  Current Facility-Administered Medications   Medication Frequency    acetaminophen tablet 650 mg Q6H    acetaminophen tablet 650 mg Q4H PRN    ammonium lactate 12 % lotion BID PRN    apixaban tablet 5 mg BID    ascorbic acid (vitamin C) tablet 500 mg BID    atorvastatin tablet 80 mg QHS    bisacodyL EC tablet 5 mg Daily PRN    cefiderocoL 1 g in dextrose 5 % (D5W) 100 mL infusion Q8H    clopidogreL tablet 75 mg Daily    dextrose 10% bolus 250 mL 250 mL Once    fluconazole tablet 200 mg Daily    gabapentin capsule 100 mg BID    glucagon (human recombinant) injection 1 mg PRN    glucose chewable tablet 16 g PRN    glucose chewable tablet 24 g PRN    hydrALAZINE tablet 100 mg Q8H    HYDROmorphone injection 0.5 mg Q6H PRN    insulin aspart U-100 pen 0-5 Units QID (AC + HS) PRN    insulin aspart U-100 pen 5 Units TIDWM    insulin detemir U-100 (Levemir) pen 16 Units QHS    miconazole NITRATE 2 % top powder BID    multivitamin tablet Daily    pantoprazole EC tablet 40 mg Daily    sodium chloride 0.9% flush 10 mL PRN    sodium chloride 0.9% flush 10 mL Q6H    And    sodium chloride 0.9% flush 10 mL PRN    sodium zirconium cyclosilicate packet 10 g TID    tamsulosin 24 hr capsule 0.4 mg Daily       Objective:     Vital Signs (Most Recent):  Temp: 98.8 °F (37.1 °C) (10/16/23 1123)  Pulse: 85 (10/16/23 1123)  Resp: 18 (10/16/23 1123)  BP: (!) 157/68 (10/16/23 1123)  SpO2: 98 % (10/16/23 1123) Vital Signs (24h Range):  Temp:  [97.9 °F (36.6 °C)-99.4 °F (37.4 °C)] 98.8 °F (37.1 °C)  Pulse:  [76-91] 85  Resp:  [18-20] 18  SpO2:  [92 %-98 %] 98 %  BP: (145-198)/(68-89) 157/68     Weight: 112.5 kg (248 lb) (10/03/23 1006)  Body mass index is 36.62 kg/m².  Body surface area is 2.34 meters squared.    I/O last 3 completed shifts:  In: 480 [P.O.:480]  Out: 1300 [Urine:1300]      Physical Exam  Vitals and nursing note reviewed.   Constitutional:       General: He is not in acute distress.     Appearance: He is well-developed. He is obese. He is not diaphoretic.   Musculoskeletal:      Right lower leg: Edema present.      Left lower leg: Edema present.   Skin:     General: Skin is warm and dry.      Coloration: Skin is not jaundiced or pale.   Neurological:      Mental Status: He is alert and oriented to person, place, and time. Mental status is at baseline.      Motor: No seizure activity.   Psychiatric:         Attention and Perception: Attention normal.         Mood and Affect: Affect normal.         Behavior: Behavior is not aggressive or combative.          Significant Labs:  All labs within the past 24 hours have been reviewed.     Significant Imaging:  Labs: Reviewed

## 2023-10-16 NOTE — PT/OT/SLP PROGRESS
Speech Language Pathology Treatment    Patient Name:  Saji Castañeda   MRN:  [id number]  Admitting Diagnosis: Bacteremia due to Gram-negative bacteria    Recommendations:                 General Recommendations:  Cognitive-linguistic therapy  Diet recommendations:  Soft & Bite Sized Diet - IDDSI Level 6, Liquid Diet Level: Thin liquids - IDDSI Level 0   Aspiration Precautions: HOB to 90 degrees and Standard aspiration precautions   General Precautions: Standard, fall  Communication strategies:  none    Assessment:     Saji Castañeda is a 73 y.o. male with an SLP diagnosis of Cognitive-Linguistic Impairment.    Subjective     Patient awake/alert    Pain/Comfort:  Pain Rating 1: 0/10  Pain Rating Post-Intervention 1: 0/10    Respiratory Status: Room air    Objective:     Has the patient been evaluated by SLP for swallowing?   Yes  Keep patient NPO? No      Pt seen for ongoing cognitive-linguistic therapy. Pt is oriented x4. Recalled 1/3 novel items after a 2 minute delay independently. Pt followed 3-step commands with 60% accuracy independently and 100% accuracy given mod cues. Completed compare/contrast tasks with 60% accuracy independently and 100% accuracy given min cues. Pt was able to list 8 clothing items given min cues and 5 vegetables given min cues within 1 minute per category. SLP provided education regarding role of SLP, cognitive tasks, treatment plan, and POC. Pt demonstrated understanding of education provided.      Goals:   Multidisciplinary Problems       SLP Goals          Problem: SLP    Goal Priority Disciplines Outcome   SLP Goal     SLP    Description: Speech Language Pathology Goals  Goals expected to be met by 10/12    1. Pt will recall 3 novel items after 2 minute delay given mod cues.  2. Pt will complete immediate memory tasks with 70% accuracy given min cues.  3. Pt will follow 3-step commands with 70% accuracy given mod cues.  4. Pt will list 7 items from a category in 1 minute given min cues.    5. Pt will participate in assessment for reading, writing, and visuospatial abilities.                               Plan:     Patient to be seen:  3 x/week   Plan of Care expires:  11/02/23  Plan of Care reviewed with:  patient   SLP Follow-Up:  Yes       Discharge recommendations:        Time Tracking:     SLP Treatment Date:   10/16/23  Speech Start Time:  0953  Speech Stop Time:  1009     Speech Total Time (min):  16 min    Billable Minutes: Speech Therapy Individual 8 and Self Care/Home Management Training 8    10/16/2023

## 2023-10-16 NOTE — PLAN OF CARE
Problem: Diabetes Comorbidity  Goal: Blood Glucose Level Within Targeted Range  Outcome: Ongoing, Progressing     Problem: Glycemic Control Impaired (Sepsis/Septic Shock)  Goal: Blood Glucose Level Within Desired Range  Outcome: Ongoing, Progressing   Iv abx given. No significant changes noted. Wounds care performed on R.heal, R.anterior foot, sacrum, and L.stump. Bed locked and in lowest position. Call light in reach.

## 2023-10-16 NOTE — PT/OT/SLP PROGRESS
Occupational Therapy   Treatment    Name: Saji Castañeda  MRN: [id number]  Admitting Diagnosis:  Bacteremia due to Gram-negative bacteria  4 Days Post-Op    Recommendations:     Discharge Recommendations: Moderate Intensity Therapy  Discharge Equipment Recommendations:  slide board  Barriers to discharge:  Other (Comment) (requires increased assist)    Assessment:     Saji Castañeda is a 73 y.o. male with a medical diagnosis of Bacteremia due to Gram-negative bacteria.  He presents with deficits in functional mobility and endurance as well as self-care tasks. Pt. Engaged in SBT with therapist with assist required on this date. Pt. Educated on BUE there ex with red theraband with good understanding noted. Patient would benefit from continued OT services to maximize level of safety and independence with self-care tasks.   Performance deficits affecting function are weakness, impaired endurance, impaired self care skills, impaired functional mobility, impaired skin, orthopedic precautions, decreased lower extremity function.     Rehab Prognosis:  Good; patient would benefit from acute skilled OT services to address these deficits and reach maximum level of function.       Plan:     Patient to be seen 3 x/week to address the above listed problems via self-care/home management, therapeutic activities, therapeutic exercises  Plan of Care Expires: 11/04/23  Plan of Care Reviewed with: patient    Subjective     Chief Complaint: Pt. Reported he had 9 more days of antibiotics  Patient/Family Comments/goals: to get back home  Pain/Comfort:  Pain Rating 1: 0/10  Pain Rating Post-Intervention 1: 0/10    Objective:     Communicated with: nurse prior to session.  Patient found supine with telemetry upon OT entry to room.    General Precautions: Standard, fall  contact  Orthopedic Precautions:RLE partial weight bearing, LLE non weight bearing (for transfers ONLY with darco shoe)  Braces:  (Darco)  Respiratory Status: Room air      Occupational Performance:     Bed Mobility:    Patient completed Sit to Supine with minimum assistance     Functional Mobility/Transfers:  Patient completed Bed <> Chair Transfer using Slide Board technique with maximal assistance with slide board. Initially pt. Min A for transfer with Slide board but bed rail to close for pt. To properly scoot to HOB rail moved and then pt. Required increased assist due to sliding forward on board. Managed to complete SBT with Max A.    Functional Mobility: not tested    Activities of Daily Living:  Toileting: Total A  Bathing: Mod A to clean BLE  thigh region and natalie area      [facility] 6 Click ADL: 17    Treatment & Education:  Pt. Issued red theraband on this date. Pt. Performed 1 set 10 reps of BUE there ex supine in bed for all major planes of BUE motion.      Patient left supine with all lines intact, call button in reach, and Positioning boots placed    GOALS:   Multidisciplinary Problems       Occupational Therapy Goals          Problem: Occupational Therapy    Goal Priority Disciplines Outcome Interventions   Occupational Therapy Goal     OT, PT/OT Ongoing, Progressing    Description: Goals to be met by: 10-15-23  Goals reviewed and revised and extended  on 10-16-23 to  be met by 10-30-23    Patient will increase functional independence with ADLs by performing:    UE Dressing with Set-up Assistance.  LE Dressing with Supervision in supine NOT MET 10-16  Grooming while seated with Set-up Assistance. MET  Toileting from drop arm commode with Minimal Assistance for hygiene and clothing management. NOT MET 10-16  Supine to sit with Supervision. NOT MET 10-16    Squat pivot transfers with Min A in darco shoe with WB through R heel only in darco NOT MET  Goal Discontinued    New Goal: SBT to wheelchair with Min A    Toilet transfer to drop arm commode with Minimal Assistance.NOT MET    Revised: SBT to drop arm commode with Min A    Pt. To be I with HEP in BUE to improve level of  endurance NOT MET                         Time Tracking:     OT Date of Treatment: 10/16/23  OT Start Time: 1352  OT Stop Time: 1438  OT Total Time (min): 46 min    Billable Minutes:Self Care/Home Management 8  Therapeutic Activity 15  Therapeutic Exercise 15    OT/KATLHEEN: OT     Number of KATHLEEN visits since last OT visit: 2    10/16/2023   Breath sounds are clear, no distress present, no wheeze, rales, rhonchi or tachypnea. Normal rate and effort.

## 2023-10-16 NOTE — ASSESSMENT & PLAN NOTE
"Non Oliguric YNES on baseline CKD III with sub nephrotic range protienuira (1.15g).   - baselien Scr ~1-1.4; Scr at time of consultation ~4.5  - UA with 2+ protein; Trace blood; 2 RBC.   - Renal US "The right kidney measures 12.1 cm.; The left kidney measures 12.8 cm.; MRD"   - 10/11/23 Urine Micro "Bacteria noted, no overt casts, some granular sediment"  - Suspect YNES from ischemic ATN from hypoperfusion in setting of shock (on dobutamine) and symptomatic bradycardia. Additional insult from Sepsis (bacteremia) and Vancomycin toxicity (trough ~27 on 10/4).   - Scr 4.5-->4.3-->4.1-->3.9-->2.9  today.   Plan:  - Continue Supportive Care  - Avoid drastic changes in BP  - strict ins and outs   - Renally dose all medications  - avoid nephrotoxic agents  - Nephrology will SIGN OFF. Please re consult if needed.   - Please schedule patient with RFP in 3-5 days of discharge for PCP to follow up on. Please schedule nephrology follow up in 2-4 weeks from discharge.      Hypernatremia  Mild Na 147  - Encourage free water intake  - Can start D5W IVFs if remains hypernatremic.   "

## 2023-10-16 NOTE — PROGRESS NOTES
[facility] ([place] [address])  Nephrology  Progress Note    Patient Name: Saji Castañeda  MRN: [id number]  Admission Date: 10/2/2023  Hospital Length of Stay: 14 days  Attending Provider: Yakelin Ying MD   Primary Care Physician: [facility] -  Principal Problem:Bacteremia due to Gram-negative bacteria    Subjective:     HPI: 73 year old Black man with hypertension, hyperlipidemia, diabetes mellitus type 2 with neuropathy, left ventricular concentric hypertrophy, paroxysmal atrial fibrillation (anticoagulated on apixaban), Mobitz type I atrioventricular heart block, chronic kidney disease stage 3, chronic venous hypertension, peripheral vascular disease, 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 Patient presnted to [facility] with complaints of fatigue. He was found to be in complete heart block and hypotensive, Additionally noted to have acute osteomyelitis of the posterior aspect of the calcaneus. He had hypotension concerning for septic shock. He was started on dobutamine, vancomycin, and piperacillin-tazobactam. Blood culture grew Acinetobacter baumannii and Klebsiella pneumoniae. Hosptial course complicated by YNES, for which nephrology was consulted for.       Interval History:     No acute events overnight. Tolerating diet well, reports feeling thirsty.     Review of patient's allergies indicates:  No Known Allergies  Current Facility-Administered Medications   Medication Frequency    acetaminophen tablet 650 mg Q6H    acetaminophen tablet 650 mg Q4H PRN    ammonium lactate 12 % lotion BID PRN    apixaban tablet 5 mg BID    ascorbic acid (vitamin C) tablet 500 mg BID    atorvastatin tablet 80 mg QHS    bisacodyL EC tablet 5 mg Daily PRN    cefiderocoL 1 g in dextrose 5 % (D5W)  100 mL infusion Q8H    clopidogreL tablet 75 mg Daily    dextrose 10% bolus 250 mL 250 mL Once    fluconazole tablet 200 mg Daily    gabapentin capsule 100 mg BID    glucagon (human recombinant) injection 1 mg PRN    glucose chewable tablet 16 g PRN    glucose chewable tablet 24 g PRN    hydrALAZINE tablet 100 mg Q8H    HYDROmorphone injection 0.5 mg Q6H PRN    insulin aspart U-100 pen 0-5 Units QID (AC + HS) PRN    insulin aspart U-100 pen 5 Units TIDWM    insulin detemir U-100 (Levemir) pen 16 Units QHS    miconazole NITRATE 2 % top powder BID    multivitamin tablet Daily    pantoprazole EC tablet 40 mg Daily    sodium chloride 0.9% flush 10 mL PRN    sodium chloride 0.9% flush 10 mL Q6H    And    sodium chloride 0.9% flush 10 mL PRN    sodium zirconium cyclosilicate packet 10 g TID    tamsulosin 24 hr capsule 0.4 mg Daily       Objective:     Vital Signs (Most Recent):  Temp: 98.8 °F (37.1 °C) (10/16/23 1123)  Pulse: 85 (10/16/23 1123)  Resp: 18 (10/16/23 1123)  BP: (!) 157/68 (10/16/23 1123)  SpO2: 98 % (10/16/23 1123) Vital Signs (24h Range):  Temp:  [97.9 °F (36.6 °C)-99.4 °F (37.4 °C)] 98.8 °F (37.1 °C)  Pulse:  [76-91] 85  Resp:  [18-20] 18  SpO2:  [92 %-98 %] 98 %  BP: (145-198)/(68-89) 157/68     Weight: 112.5 kg (248 lb) (10/03/23 1006)  Body mass index is 36.62 kg/m².  Body surface area is 2.34 meters squared.    I/O last 3 completed shifts:  In: 480 [P.O.:480]  Out: 1300 [Urine:1300]     Physical Exam  Vitals and nursing note reviewed.   Constitutional:       General: He is not in acute distress.     Appearance: He is well-developed. He is obese. He is not diaphoretic.   Musculoskeletal:      Right lower leg: Edema present.      Left lower leg: Edema present.   Skin:     General: Skin is warm and dry.      Coloration: Skin is not jaundiced or pale.   Neurological:      Mental Status: He is alert and oriented to person, place, and time. Mental status is at baseline.      Motor: No seizure activity.  "  Psychiatric:         Attention and Perception: Attention normal.         Mood and Affect: Affect normal.         Behavior: Behavior is not aggressive or combative.          Significant Labs:  All labs within the past 24 hours have been reviewed.     Significant Imaging:  Labs: Reviewed    Assessment/Plan:     Cardiac/Vascular  History of complete heart block  Per primary team.     Renal/  Acute on chronic renal failure  Non Oliguric YNES on baseline CKD III with sub nephrotic range protienuira (1.15g).   - baselien Scr ~1-1.4; Scr at time of consultation ~4.5  - UA with 2+ protein; Trace blood; 2 RBC.   - Renal US "The right kidney measures 12.1 cm.; The left kidney measures 12.8 cm.; MRD"   - 10/11/23 Urine Micro "Bacteria noted, no overt casts, some granular sediment"  - Suspect YNES from ischemic ATN from hypoperfusion in setting of shock (on dobutamine) and symptomatic bradycardia. Additional insult from Sepsis (bacteremia) and Vancomycin toxicity (trough ~27 on 10/4).   - Scr 4.5-->4.3-->4.1-->3.9-->2.9  today.   Plan:  - Continue Supportive Care  - Avoid drastic changes in BP  - strict ins and outs   - Renally dose all medications  - avoid nephrotoxic agents  - Nephrology will SIGN OFF. Please re consult if needed.   - Please schedule patient with [facility] in 3-5 days of discharge for PCP to follow up on. Please schedule nephrology follow up in 2-4 weeks from discharge.      Hypernatremia  Mild Na 147  - Encourage free water intake  - Can start D5W IVFs if remains hypernatremic.     ID  * Bacteremia due to Gram-negative bacteria  Per primary team.     Chronic osteomyelitis  Per primary team.         Thank you for your consult. I will follow-up with patient. Please contact us if you have any additional questions.     Case discussed with attending. Attestation to follow.       Terrell Mcdonnell DO  Nephrology  [facility] ([place] [address])    ATTENDING PHYSICIAN ATTESTATION  I have personally verified the " history and examined the patient. I thoroughly reviewed the demographic, clinical, laboratorial and imaging information available in medical records. I agree with the assessment and recommendations provided by the subspecialty resident who was under my supervision.

## 2023-10-16 NOTE — PROGRESS NOTES
[facility] - Intensive Care ([address] Medicine  Progress Note    Patient Name: Saji Castañeda  MRN: [id number]  Patient Class: IP- Inpatient   Admission Date: 10/2/2023  Length of Stay: 14 days  Attending Physician: Yakelin Ying MD  Primary Care Provider: [facility] -        Subjective:     Principal Problem:Bacteremia due to Gram-negative bacteria        HPI:  Saji Castañeda is a 73 year old Black man with hypertension, hyperlipidemia, diabetes mellitus type 2 with neuropathy, left ventricular concentric hypertrophy, paroxysmal atrial fibrillation (anticoagulated on apixaban), Mobitz type I atrioventricular heart block, chronic kidney disease stage 3, chronic venous hypertension, peripheral vascular disease, 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 He lives in [place], [place].               He presented to [facility] on 10/2/2023 from [facility] due to concerns of weakness and feeling like he was going to die. He reported no dizziness or headaches. He knew his name, where he was, and the current year but had trouble remembering which nursing home he came from. Blood glucose was in the 20s. He was given dextrose in the emergency department and glucose improved to 96 mg/dL. Labs showed acute kidney injury with creatinine of 2.6 mg/dL from baseline of 1.3 and lactic acid elevated at 2.29 mmol/L. He had complete heart block. He was admitted to Cardiology.      Overview/Hospital Course:  Saji Castañeda is a 73 year old Black man with hypertension, hyperlipidemia, diabetes mellitus type 2 with neuropathy, left ventricular concentric hypertrophy, chronic lower extremity osteomyelitis,  presented to [facility] due to concerns of weakness  and feeling like he was going to die. He had type 2 second-degree heart block. He was admitted to Cardiology.  However patient's blood pressure dropped significantly and was transferred to ICU for sepsis.  With PICC line verses acute osteomyelitis of the posterior aspect of the calcaneus as a possible source.. He was started on dobutamine, vancomycin, and piperacillin-tazobactam. Blood culture grew Acinetobacter baumannii and Klebsiella pneumoniae.Vascular Surgery suspected poor wound healing to be due to severe edema. They ordered ankle brachial indices but it was unable to be done due to the hardness of his skin. They did not recommend amputation based on their physical exam findings.  Infectious Disease changed piperacillin-tazobactam to cefidericol and fluconazole. He continued to have asymptomatic bradycardia. Foot wound culture grew Achromobacter xylosoxidans dentrificans and Candida albicans. Lower extremity arterial ultrasound suggested moderate stenosis of the right anterior tibial artery and severe stenosis of the right posterior tibial artery. Vascular Surgery reviewed this and felt it would not hinder his ability to heal.   He had acute on chronic renal failure.  Repeat EKG showed 2nd degree AV block replacing junctional rhythm. Electrophysiology recommended pacemaker placement. Electrophysiology reviewed his EKG on 10/8/2023 and noted 2nd degree heart block Mobitz I. Renal function did not improve. Fractional excretion of sodium was 1.5% suggesting intrinsic cause. Urinalysis showed pyuria and bacteriuria, similar to 2 other urinalyses done since admission, but urine culture again had no growth. Nephrology was consulted for persistent acute kidney injury and suspected ischemic acute tubular necrosis from hypoperfusion in setting of shock (on dobutamine) and symptomatic bradycardia with additional insult from sepsis (bacteremia) and vancomycin toxicity (trough ~27 on 10/4/2023).  Patient underwent  pacemaker placement on 10/12.  Tolerated procedure well.  No postop complications      Interval History:  Patient is comfortably resting on bed.  No overnight events.  Plan to discharge pt on iv abx.    tReview of Systems   Constitutional:  Negative for chills and fever.   Neurological:  Negative for seizures and syncope.     Objective:     Vital Signs (Most Recent):  Temp: 98.1 °F (36.7 °C) (10/16/23 1511)  Pulse: 96 (10/16/23 1521)  Resp: 18 (10/16/23 1511)  BP: (!) 145/74 (10/16/23 1511)  SpO2: 97 % (10/16/23 1511) Vital Signs (24h Range):  Temp:  [98.1 °F (36.7 °C)-99.4 °F (37.4 °C)] 98.1 °F (36.7 °C)  Pulse:  [76-96] 96  Resp:  [18-20] 18  SpO2:  [92 %-98 %] 97 %  BP: (145-196)/(68-84) 145/74     Weight: 112.5 kg (248 lb)  Body mass index is 36.62 kg/m².    Intake/Output Summary (Last 24 hours) at 10/16/2023 1709  Last data filed at 10/16/2023 0423  Gross per 24 hour   Intake --   Output 800 ml   Net -800 ml           Physical Exam  Vitals and nursing note reviewed.   Constitutional:       General: He is not in acute distress.     Appearance: He is well-developed. He is obese. He is not diaphoretic.   Musculoskeletal:      Right lower leg: Edema present.      Left lower leg: Edema present.   Skin:     General: Skin is warm and dry.      Coloration: Skin is not jaundiced or pale.   Neurological:      Mental Status: He is alert and oriented to person, place, and time. Mental status is at baseline.      Motor: No seizure activity.   Psychiatric:         Attention and Perception: Attention normal.         Mood and Affect: Affect normal.         Behavior: Behavior is not aggressive or combative.             Significant Labs: All pertinent labs within the past 24 hours have been reviewed.  Recent Labs   Lab 10/14/23  0244 10/15/23  0420 10/16/23  0449    142 146*   K 4.7 4.4 4.1    111* 113*   CO2 21* 22* 22*   BUN 47* 39* 38*   CREATININE 3.1* 3.0* 2.9*   CALCIUM 8.4* 8.2* 8.5*           Significant  Imaging: I have reviewed all pertinent imaging results/findings within the past 24 hours.        Assessment/Plan:      * Bacteremia due to Gram-negative bacteria   sepsis secondary to ESBL Klebsiella and  Acinetobacter bacteremia. likely source includes PICC line - also consider from left heel wound. PICC line removed 10/4/2023. Repeat blood cultures NGTD.      Cefidericol was added to blood culture report from 10/2, awaiting results of sensitivity report. Wound culture + C. Albicans and achromobacter xylosoxidans.         Recommendations:  1. Continue cefidericol 1g IV q8hr (CrCl 18- renally dosed) and fluconazole 200mg qd   2. No further vascular surgical plans at this time. Stating pt has ability to heal wounds. Recommend to continue frequent wound care, off loading, and nutritional support.    3. Plan to continue IV therapy until original end of care date for osteo (10/25/23),    Hyperkalemia  Resolved   K-6.2 on 10/12, (most likely hemolyzed sample )  1 dose of Lokelma was given prior to the procedure. 1 dose of calcium gluconate given and EKG was ordered, hyperkalemia per plan was initiated.   However Repeat potassium was 5.3. orders were discontinued.  We will only continue Lokelma for now        Cardiogenic shock        Complete heart block        Asymptomatic bacteriuria  Each urine culture has pyuria and bacteriuria. Urine culture on 10/2/2023 had no growth. Urine culture on 10/4/2023 grew multiple organisms with none in predominance. Urine culture on 10/9/2023 had no growth.     Blurred vision, left eye  Reports this to be chronic. Follow up with an ophthalmologist.    History of complete heart block  AV block, 2nd degree  Pacemaker placed on 10/12,  no postop complication    Paroxysmal atrial fibrillation  Continue home apixaban.     AV block, 2nd degree  Pacemaker placed on 10/12.    Chronic osteomyelitis        Nonhealing ulcer of right lower leg with fat layer exposed  Edema of leg  Vascular  Surgery believes edema contributes to poor wound healing. They did not recommend amputation. They recommended compression wrappings. Consulted Wound Care for this. Already on medical management for PAD with clopidrogel, apixaban, atorvastatin.     Chronic deep vein thrombosis (DVT) of right upper extremity  Continue home apixaban.    Peripheral arterial disease  Continue home clopidrogel and atorvastatin.  Evaluated by vascular surgery recommended that patient was not a revascularization candidate due to extensive disease, multiple comorbidities, debility/bed bound status and concern for noncompliance.    Acute on chronic renal failure  On admission, has worsened since. Monitor labs. FENa 1.5%. Occasional eosinophils. No obstruction on renal ultrasound. Appreciate Nephrology, who believe it is multifactorial.    YNES (acute kidney injury)  Patient with acute kidney injury/acute renal failure likely due to pre-renal azotemia due to dehydration YNES is currently improving. Baseline creatinine 2 - Labs reviewed- Renal function/electrolytes with Estimated Creatinine Clearance: 27.6 mL/min (A) (based on SCr of 2.9 mg/dL (H)). according to latest data. Monitor urine output and serial BMP and adjust therapy as needed. Avoid nephrotoxins and renally dose meds for GFR listed above.    Insulin dependent type 2 diabetes mellitus  Holding home glipizide. He takes insulin glargine 45 units HS and insulin aspart 12 units TID. Giving insulin detemir 25 units HS, insulin aspart 5 units TID and sliding scale. Monitor Accuchecks.    Edema of leg          VTE Risk Mitigation (From admission, onward)         Ordered     apixaban tablet 5 mg  2 times daily         10/11/23 0932     IP VTE HIGH RISK PATIENT  Once         10/02/23 1513     Place sequential compression device  Until discontinued         10/02/23 1513                Discharge Planning   OXANA: 10/17/2023     Code Status: Full Code   Is the patient medically ready for  discharge?: No    Reason for patient still in hospital (select all that apply): Pending disposition  Discharge Plan A: Home Health                  Yakelin Ying MD  Department of Hospital Medicine   [facility] - Intensive Care ([place])

## 2023-10-16 NOTE — PT/OT/SLP PROGRESS
"Physical Therapy Treatment    Patient Name:  Saji Castañeda   MRN:  [id number]    Recommendations:     Discharge Recommendations: Moderate Intensity Therapy  Discharge Equipment Recommendations: none  Barriers to discharge:  current level of assist required    Assessment:     Saji Castañeda is a 73 y.o. male admitted with a medical diagnosis of Bacteremia due to Gram-negative bacteria.  He presents with the following impairments/functional limitations: weakness, impaired functional mobility, impaired endurance, impaired self care skills, decreased lower extremity function, orthopedic precautions.    Pt agreeable to PT and tolerated session well. Pt performed scoot pivot to w/c with CGA, with second person to stabilize w/c for transfer. Initially took a few attempts prior to completing transfer with pt reporting RLE weakness from not getting out of bed over the weekend. Pt may benefit from slide board use during transfer next session.    Rehab Prognosis: Good; patient would benefit from acute skilled PT services to address these deficits and reach maximum level of function.    Recent Surgery: Procedure(s) (LRB):  [id number] (N/A) 4 Days Post-Op    Plan:     During this hospitalization, patient to be seen 3 x/week to address the identified rehab impairments via gait training, therapeutic activities, therapeutic exercises, neuromuscular re-education and progress toward the following goals:    Plan of Care Expires:  11/02/23    Subjective     Chief Complaint: RLE weakness   Patient/Family Comments/goals: "I used to get in my chair without any help"  Pain/Comfort:  Pain Rating 1: 0/10      Objective:     Communicated with nurse prior to session.  Patient found HOB elevated with telemetry, peripheral IV upon PT entry to room.     General Precautions: Standard, fall, contact  Orthopedic Precautions: RLE partial weight bearing, LLE non weight bearing  Braces:  (darco)  Respiratory Status: Room air   "   Functional Mobility:  Bed Mobility:     Scooting along EOB: stand by assistance  Supine to Sit: supervision  Transfers:     Bed to Wheelchair: contact guard assistance with  no AD  using  Scoot Pivot to R  Arm rest removed  Initially took 2 attempts prior to completing transfer  RLE weakness  Second person assist to stabilize chair      AM-PAC 6 CLICK MOBILITY  Turning over in bed (including adjusting bedclothes, sheets and blankets)?: 3  Sitting down on and standing up from a chair with arms (e.g., wheelchair, bedside commode, etc.): 2  Moving from lying on back to sitting on the side of the bed?: 3  Moving to and from a bed to a chair (including a wheelchair)?: 3  Need to walk in hospital room?: 1  Climbing 3-5 steps with a railing?: 1  Basic Mobility Total Score: 13       Treatment & Education:  Patient educated on performing seated hip flexion, extension, and LAQs to maintain strength for transfers  Patient educated on importance of sitting up in chair.  Discussed PT plan of care during hospitalization.   Patient educated on calling for assistance to return to bed.   All questions answered within PT scope of practice.     Patient left up in chair with all lines intact, call button in reach, and nurse notified..    GOALS:   Multidisciplinary Problems       Physical Therapy Goals          Problem: Physical Therapy    Goal Priority Disciplines Outcome Goal Variances Interventions   Physical Therapy Goal     PT, PT/OT Ongoing, Progressing     Description: Goals to be met by: 2023    Patient will increase functional independence with mobility by performin. Supine to sit with [place]- met 10/9  2. Bed to chair transfer with Contact Guard Assistance using LRAD - met 10/9  2a. Chair to bed transfer with SBA using LRAD including slideboard - not met  3. Wheelchair propulsion x100 feet with Supervision not met  4. Lower extremity exercise program x10 reps per handout, with independence - not met                          Time Tracking:     PT Received On: 10/16/23  PT Start Time: 1145     PT Stop Time: 1201  PT Total Time (min): 16 min     Billable Minutes: Therapeutic Activity 16    Treatment Type: Treatment  PT/PTA: PT     Number of PTA visits since last PT visit: 0     10/16/2023

## 2023-10-16 NOTE — CONSULTS
NAIS placed 18g, 10 cm Midline in right brachial vein using u/s guidance.  Max dwell date 11/14/2023.  Lot # [id number].    MIDLINE inserted for IV abx:  cefiderocol EED 10/25/2023

## 2023-10-16 NOTE — PLAN OF CARE
CHW met with patient/family at bedside. Patient experience rounding completed and reviewed the following.     Do you know your discharge plan? No,    If yes, what is the plan? (Home, Home Health, Rehab, SNF, LTAC, or Other)     Have you discussed your needs and preferences with your SW/CM? No     If you are discharging home, do you have help at home?  No  Do you think you will need help additional at home at discharge? Yes      Do you currently have difficulty keeping up with bills, affording medicine or buying food? Yes, CHW printed Power to Care and [facility] resources for sister. CHW left resources at patients bedside and informed Ms. Gaitan.     Assigned SW/CM notified of any patient/family needs or concerns. Appropriate resources provided to address patient's needs.  No needs/ concerns

## 2023-10-16 NOTE — ASSESSMENT & PLAN NOTE
Patient with acute kidney injury/acute renal failure likely due to pre-renal azotemia due to dehydration YNES is currently improving. Baseline creatinine 2 - Labs reviewed- Renal function/electrolytes with Estimated Creatinine Clearance: 27.6 mL/min (A) (based on SCr of 2.9 mg/dL (H)). according to latest data. Monitor urine output and serial BMP and adjust therapy as needed. Avoid nephrotoxins and renally dose meds for GFR listed above.

## 2023-10-16 NOTE — SUBJECTIVE & OBJECTIVE
Interval History:  Patient is comfortably resting on bed.  No overnight events.  Plan to discharge pt on iv abx.    tReview of Systems   Constitutional:  Negative for chills and fever.   Neurological:  Negative for seizures and syncope.     Objective:     Vital Signs (Most Recent):  Temp: 98.1 °F (36.7 °C) (10/16/23 1511)  Pulse: 96 (10/16/23 1521)  Resp: 18 (10/16/23 1511)  BP: (!) 145/74 (10/16/23 1511)  SpO2: 97 % (10/16/23 1511) Vital Signs (24h Range):  Temp:  [98.1 °F (36.7 °C)-99.4 °F (37.4 °C)] 98.1 °F (36.7 °C)  Pulse:  [76-96] 96  Resp:  [18-20] 18  SpO2:  [92 %-98 %] 97 %  BP: (145-196)/(68-84) 145/74     Weight: 112.5 kg (248 lb)  Body mass index is 36.62 kg/m².    Intake/Output Summary (Last 24 hours) at 10/16/2023 1709  Last data filed at 10/16/2023 0423  Gross per 24 hour   Intake --   Output 800 ml   Net -800 ml           Physical Exam  Vitals and nursing note reviewed.   Constitutional:       General: He is not in acute distress.     Appearance: He is well-developed. He is obese. He is not diaphoretic.   Musculoskeletal:      Right lower leg: Edema present.      Left lower leg: Edema present.   Skin:     General: Skin is warm and dry.      Coloration: Skin is not jaundiced or pale.   Neurological:      Mental Status: He is alert and oriented to person, place, and time. Mental status is at baseline.      Motor: No seizure activity.   Psychiatric:         Attention and Perception: Attention normal.         Mood and Affect: Affect normal.         Behavior: Behavior is not aggressive or combative.             Significant Labs: All pertinent labs within the past 24 hours have been reviewed.  Recent Labs   Lab 10/14/23  0244 10/15/23  0420 10/16/23  0449    142 146*   K 4.7 4.4 4.1    111* 113*   CO2 21* 22* 22*   BUN 47* 39* 38*   CREATININE 3.1* 3.0* 2.9*   CALCIUM 8.4* 8.2* 8.5*           Significant Imaging: I have reviewed all pertinent imaging results/findings within the past 24  hours.

## 2023-10-16 NOTE — PLAN OF CARE
[facility] ([place] [id number])      HOME HEALTH ORDERS  FACE TO FACE ENCOUNTER    Patient Name: Saji Castañeda  YOB: 1950    PCP: [facility] -   PCP Address: [address] [place] [address] / [place] [address]  PCP Phone Number: [phone]  PCP Fax: [phone]    Encounter Date: 10/2/23    Admit to Home Health    Diagnoses:  Active Hospital Problems    Diagnosis  POA    *Bacteremia due to Gram-negative bacteria [R78.81]  Yes    Hyperkalemia [E87.5]  Unknown    Complete heart block [I44.2]  Yes    Cardiogenic shock [R57.0]  Yes    Asymptomatic bacteriuria [R82.71]  Yes    Blurred vision, left eye [H53.8]  Yes     Chronic    Paroxysmal atrial fibrillation [I48.0]  No     Chronic    History of complete heart block [Z86.79]  Not Applicable     Chronic    AV block, 2nd degree [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]  Yes    Chronic osteomyelitis [M86.60]  Yes    Nonhealing ulcer of right lower leg with fat layer exposed [L97.912]  Yes    Goals of care, counseling/discussion [Z71.89]  Not Applicable    Chronic deep vein thrombosis (DVT) of right upper extremity [I82.721]  Yes     Chronic    Peripheral arterial disease [I73.9]  Yes     Chronic     - long standing wounds with skin graft to left TMA in [id number]; LE angio in 2014 with patent left CFA, SFA, PFA with 3V run off on LE angiogram by Vascular surgery  - CTA LE with run off 6/2022 with moderate to high grade disease of right popliteal with severely diseased AT, PT, peroneal and multiple occlusion of left popliteal with occluded PT and peroneal and short segment occlusion of AT- patient denies any previous intervention  - BLE arterial ultrasound 3/2023 with similar findings suggestive of bilateral popliteal and BTK disease; seen by Vascular surgery at [facility] with recommendation for amputation- patient declined  - transferred to [facility] for evaluation for revascularization; extensive disease and multiple co morbidities along with debility/bed bound status and  concern for non compliance, deemed not a  Revascularization candidate and placed on statin and Plavix; no ASA given risk of bleeding with triple therapy        Acute on chronic renal failure [N17.9, N18.9]  Yes    YNES (acute kidney injury) [N17.9]  Yes    Insulin dependent type 2 diabetes mellitus [E11.9, Z79.4]  Not Applicable     Chronic    Edema of leg [R60.0]  Yes     Chronic      Resolved Hospital Problems    Diagnosis Date Resolved POA    Sepsis [A41.9] 10/05/2023 Yes    Palliative care encounter [Z51.5] 10/04/2023 Not Applicable       Follow Up Appointments:  Future Appointments   Date Time Provider Department Center   10/20/2023 10:40 AM PACEMAKER, ICD [facility]       Allergies:Review of patient's allergies indicates:  No Known Allergies    Medications: Review discharge medications with patient and family and provide education.    Current Facility-Administered Medications   Medication Dose Route Frequency Provider Last Rate Last Admin    acetaminophen tablet 650 mg  650 mg Oral Q6H Meli Escobedo MD   650 mg at 10/16/23 1250    acetaminophen tablet 650 mg  650 mg Oral Q4H PRN Kevin Lehman MD        ammonium lactate 12 % lotion   Topical (Top) BID PRN Milton Shelton MD        apixaban tablet 5 mg  5 mg Oral BID Milton Shelton MD   5 mg at 10/16/23 0921    ascorbic acid (vitamin C) tablet 500 mg  500 mg Oral BID Meli Escobedo MD   500 mg at 10/16/23 0921    atorvastatin tablet 80 mg  80 mg Oral QHS Adama Lara MD   80 mg at 10/15/23 2150    bisacodyL EC tablet 5 mg  5 mg Oral Daily PRN Milton Shelton MD        cefiderocoL 1 g in dextrose 5 % (D5W) 100 mL infusion  1 g Intravenous Q8H Raheem White PA-C   Stopped at 10/16/23 1027    clopidogreL tablet 75 mg  75 mg Oral Daily Milton Shelton MD   75 mg at 10/16/23 0921    dextrose 10% bolus 250 mL 250 mL  25 g Intravenous Once Yakelin Ying MD        fluconazole tablet 200 mg  200 mg Oral Daily Raheem White, PA-C   200  mg at 10/16/23 0921    gabapentin capsule 100 mg  100 mg Oral BID Milton Shelton MD   100 mg at 10/16/23 0921    glucagon (human recombinant) injection 1 mg  1 mg Intramuscular PRN Meli Escobedo MD        glucose chewable tablet 16 g  16 g Oral PRN Meli Escobedo MD        glucose chewable tablet 24 g  24 g Oral PRN Meli Escobedo MD        hydrALAZINE tablet 100 mg  100 mg Oral Q8H Meli Escobedo MD   100 mg at 10/16/23 0603    HYDROmorphone injection 0.5 mg  0.5 mg Intravenous Q6H PRN Meli Escobedo MD        insulin aspart U-100 pen 0-5 Units  0-5 Units Subcutaneous QID (AC + HS) PRN Meli Escobedo MD   2 Units at 10/13/23 1717    insulin aspart U-100 pen 5 Units  5 Units Subcutaneous TIDWM Milton Shelton MD   5 Units at 10/16/23 1252    insulin detemir U-100 (Levemir) pen 16 Units  16 Units Subcutaneous QHS Yakelin Ying MD   16 Units at 10/15/23 2150    miconazole NITRATE 2 % top powder   Topical (Top) BID Blanca Marin MD   Given at 10/16/23 0925    multivitamin tablet  1 tablet Oral Daily Meli Escobedo MD   1 tablet at 10/16/23 0921    pantoprazole EC tablet 40 mg  40 mg Oral Daily Meli Escobedo MD   40 mg at 10/16/23 0921    sodium chloride 0.9% flush 10 mL  10 mL Intravenous PRN Adama Lara MD        sodium chloride 0.9% flush 10 mL  10 mL Intravenous Q6H Yakelin Ying MD   10 mL at 10/16/23 1251    And    sodium chloride 0.9% flush 10 mL  10 mL Intravenous PRN Yakelin Ying MD        sodium zirconium cyclosilicate packet 10 g  10 g Oral TID Yakelin Ying MD   10 g at 10/16/23 0920    tamsulosin 24 hr capsule 0.4 mg  0.4 mg Oral Daily Meli Escobedo MD   0.4 mg at 10/16/23 0921     Current Discharge Medication List        START taking these medications    Details   cefiderocoL 1 gram SolR Inject 1 g into the vein every 8 (eight) hours.  Qty: 30 g, Refills: 0      fluconazole (DIFLUCAN) 200 MG Tab Take 1 tablet (200 mg total) by mouth once daily. for 9 days  Qty: 9  tablet, Refills: 0      hydrALAZINE (APRESOLINE) 100 MG tablet Take 1 tablet (100 mg total) by mouth every 8 (eight) hours.  Qty: 90 tablet, Refills: 11    Comments: .      miconazole NITRATE 2 % (MICOTIN) 2 % top powder Apply topically 2 (two) times daily.  Qty: 20 g, Refills: 0           CONTINUE these medications which have CHANGED    Details   gabapentin (NEURONTIN) 100 MG capsule Take 1 capsule (100 mg total) by mouth 2 (two) times daily.  Qty: 60 capsule, Refills: 1      insulin aspart U-100 (NOVOLOG) 100 unit/mL (3 mL) InPn pen Inject 6 Units into the skin 3 (three) times daily with meals.  Qty: 30 mL, Refills: 3           CONTINUE these medications which have NOT CHANGED    Details   acetaminophen (TYLENOL) 325 MG tablet Take 650 mg by mouth every 6 (six) hours as needed for Temperature greater than.      acidophilus-pectin, citrus 100 million cell-10 mg Cap Take 1 capsule by mouth 2 (two) times a day.      apixaban (ELIQUIS) 5 mg Tab Take 1 tablet (5 mg total) by mouth 2 (two) times daily.  Qty: 60 tablet, Refills: 0      ascorbic acid, vitamin C, (VITAMIN C) 500 MG tablet Take 500 mg by mouth 2 (two) times daily.      B COMPLEX-VITAMIN B12 tablet Take 1 tablet by mouth once daily.      clopidogreL (PLAVIX) 75 mg tablet Take 1 tablet (75 mg total) by mouth once daily.  Qty: 60 tablet, Refills: 0      diphenhydrAMINE (BENADRYL) 25 mg capsule No directions listed on the patients medication list.      glipiZIDE (GLUCOTROL) 10 MG tablet Take 20 mg by mouth 2 (two) times a day.      isosorbide dinitrate (ISORDIL) 10 MG tablet Take 1 tablet (10 mg total) by mouth 3 (three) times daily.  Qty: 90 tablet, Refills: 11      LANTUS SOLOSTAR U-100 INSULIN glargine 100 units/mL SubQ pen Inject 45 Units into the skin every evening.  Refills: 0      multivitamin (THERAGRAN) per tablet Take 1 tablet by mouth once daily.      oxyCODONE 5 mg TbOr Take 1 tablet by mouth every 6 (six) hours as needed (Pain (Max 5 daily)).     "  pantoprazole (PROTONIX) 40 MG tablet Take 40 mg by mouth once daily. Before breakfast      rosuvastatin (CRESTOR) 40 MG Tab Take 40 mg by mouth once daily.      sodium hypochlorite 0.5 % (DAKIN'S SOLUTION) external solution Apply topically once daily.      tamsulosin (FLOMAX) 0.4 mg Cap Take 0.4 mg by mouth once daily.      triamcinolone acetonide 0.025% (KENALOG) 0.025 % Oint Apply topically 2 (two) times daily as needed (to affected area).      !! BD ULTRA-FINE ADITYA PEN NEEDLE 32 gauge x 5/32" Ndle USE FOUR TIMES DAILY WITH MEALS AND NIGHTLY  Qty: 100 each, Refills: 0    Associated Diagnoses: Type II diabetes mellitus, uncontrolled      blood sugar diagnostic (CONTOUR TEST STRIPS) Strp Inject 1 strip into the skin 2 (two) times daily before meals.  Qty: 100 strip, Refills: 6    Comments: To use with Contour Ascencia meter  Associated Diagnoses: Type II diabetes mellitus, uncontrolled      MICROLET LANCET Misc Refills: 0      !! pen needle, diabetic 32 gauge x 5/32" Ndle use as directed with insulin  Qty: 100 each, Refills: 2       !! - Potential duplicate medications found. Please discuss with provider.        STOP taking these medications       furosemide (LASIX) 40 MG tablet Comments:   Reason for Stopping:         losartan-hydrochlorothiazide 100-25 mg (HYZAAR) 100-25 mg per tablet Comments:   Reason for Stopping:         metFORMIN (GLUMETZA) 1000 MG (MOD) 24hr tablet Comments:   Reason for Stopping:                 I have seen and examined this patient within the last 30 days. My clinical findings that support the need for the home health skilled services and home bound status are the following:no   Weakness/numbness causing balance and gait disturbance due to Infection making it taxing to leave home.     Diet:   renal diet    Labs:  SN to perform labs:  BMP: Weekly; 1 week(s)    Referrals/ Consults  Physical Therapy to evaluate and treat. Evaluate for home safety and equipment needs; Establish/upgrade home " exercise program. Perform / instruct on therapeutic exercises, gait training, transfer training, and Range of Motion.  Occupational Therapy to evaluate and treat. Evaluate home environment for safety and equipment needs. Perform/Instruct on transfers, ADL training, ROM, and therapeutic exercises.    Activities:   activity as tolerated    Nursing:   Agency to admit patient within 24 hours of hospital discharge unless specified on physician order or at patient request    SN to complete comprehensive assessment including routine vital signs. Instruct on disease process and s/s of complications to report to MD. Review/verify medication list sent home with the patient at time of discharge  and instruct patient/caregiver as needed. Frequency may be adjusted depending on start of care date.     Skilled nurse to perform up to 3 visits PRN for symptoms related to diagnosis    Notify MD if SBP > 160 or < 90; DBP > 90 or < 50; HR > 120 or < 50; Temp > 101; O2 < 88%; Other:       Ok to schedule additional visits based on staff availability and patient request on consecutive days within the home health episode.    When multiple disciplines ordered:    Start of Care occurs on Sunday - Wednesday schedule remaining discipline evaluations as ordered on separate consecutive days following the start of care.    Thursday SOC -schedule subsequent evaluations Friday and Monday the following week.     Friday - Saturday SOC - schedule subsequent discipline evaluations on consecutive days starting Monday of the following week.    For all post-discharge communication and subsequent orders please contact patient's primary care physician.     Miscellaneous   Home Infusion Therapy:   SN to perform Infusion Therapy/Central Line Care.  Review Central Line Care & Central Line Flush with patient.    Administer (drug and dose): cefidericol 1g IV q8hr  Last dose given:     2pm              Home dose due: 10pm    Scrub the Hub: Prior to accessing the  line, always perform a 30 second alcohol scrub  Each lumen of the central line is to be flushed at least daily with 10 mL Normal Saline and 3 mL Heparin flush (10 units/mL)  Skilled Nurse (SN) may draw blood from IV access  Blood Draw Procedure:   - Aspirate at least 5 mL of blood   - Discard   - Obtain specimen   - Change injection cap   - Flush with 20 mL Normal Saline followed by a                 3-5 mL Heparin flush (10 units/mL)  Central :   - Sterile dressing changes are done weekly and as needed.   - Use chlor-hexadine scrub to cleanse site, apply Biopatch to insertion site,       apply securement device dressing   - Injection caps are changed weekly and after EVERY lab draw.   - If sterile gauze is under dressing to control oozing,                 dressing change must be performed every 24 hours until gauze is not needed.    Home Health Aide:  Physical Therapy Three times weekly and Occupational Therapy Three times weekly    Wound Care Orders  yes:  Vascular Wound:  Location:  Left foot    Consult ET nurse        Apply the following to wound:  Daily - R heel - cleanse gently w/ NS, pat dry. Apply betadine and allow to dry before applying foam dressing.     Q3 days/PRN saturation - L foot amputation - cleanse gently w/ NS, allow to dry. Apply Hydrofera blue to wound bed and secure w/ foam dressing.     BID/PRN - groin/abd folds - cleanse gently w/ soap and water or baby wipes, pat dry. Apply Miconazole powder to groin and abd folds. Keep pt clean and dry.     BID/PRN - Buttocks - cleanse gently w/ soap and water, pat dry and apply Z-guard paste. No foam dressings or diapers     ** Apply Lac-Hydrin lotion to dry skin areas at both legs daily.    I certify that this patient is confined to his home and needs physical therapy and occupational therapy.

## 2023-10-16 NOTE — PLAN OF CARE
Problem: Adult Inpatient Plan of Care  Goal: Plan of Care Review  Outcome: Ongoing, Not Progressing  Goal: Optimal Comfort and Wellbeing  Outcome: Ongoing, Not Progressing     Problem: Infection  Goal: Absence of Infection Signs and Symptoms  Outcome: Ongoing, Not Progressing     AAOx4. Continues iv iv abx. HR 77 and paced on telemetry monitoring. Potassium 4.4. Awaiting  central line removal ; new order for picc line placement for long  term iv abx.

## 2023-10-17 LAB
ALBUMIN SERPL BCP-MCNC: 2.2 G/DL (ref 3.5–5.2)
ANION GAP SERPL CALC-SCNC: 11 MMOL/L (ref 8–16)
BUN SERPL-MCNC: 33 MG/DL (ref 8–23)
CALCIUM SERPL-MCNC: 8.4 MG/DL (ref 8.7–10.5)
CHLORIDE SERPL-SCNC: 114 MMOL/L (ref 95–110)
CO2 SERPL-SCNC: 21 MMOL/L (ref 23–29)
CREAT SERPL-MCNC: 2.7 MG/DL (ref 0.5–1.4)
ERYTHROCYTE [DISTWIDTH] IN BLOOD BY AUTOMATED COUNT: 19.5 % (ref 11.5–14.5)
EST. GFR  (NO RACE VARIABLE): 24 ML/MIN/1.73 M^2
GLUCOSE SERPL-MCNC: 139 MG/DL (ref 70–110)
GLUCOSE SERPL-MCNC: 141 MG/DL (ref 70–110)
HCT VFR BLD AUTO: 23.6 % (ref 40–54)
HGB BLD-MCNC: 7.2 G/DL (ref 14–18)
MCH RBC QN AUTO: 27.6 PG (ref 27–31)
MCHC RBC AUTO-ENTMCNC: 30.5 G/DL (ref 32–36)
MCV RBC AUTO: 90 FL (ref 82–98)
PHOSPHATE SERPL-MCNC: 3 MG/DL (ref 2.7–4.5)
PLATELET # BLD AUTO: 267 K/UL (ref 150–450)
PMV BLD AUTO: 10.8 FL (ref 9.2–12.9)
POCT GLUCOSE: 105 MG/DL (ref 70–110)
POCT GLUCOSE: 129 MG/DL (ref 70–110)
POCT GLUCOSE: 131 MG/DL (ref 70–110)
POCT GLUCOSE: 141 MG/DL (ref 70–110)
POTASSIUM SERPL-SCNC: 3.8 MMOL/L (ref 3.5–5.1)
RBC # BLD AUTO: 2.61 M/UL (ref 4.6–6.2)
SODIUM SERPL-SCNC: 146 MMOL/L (ref 136–145)
WBC # BLD AUTO: 6.86 K/UL (ref 3.9–12.7)

## 2023-10-17 PROCEDURE — 97530 THERAPEUTIC ACTIVITIES: CPT | Mod: CO

## 2023-10-17 PROCEDURE — 97530 THERAPEUTIC ACTIVITIES: CPT | Mod: CQ

## 2023-10-17 PROCEDURE — 97535 SELF CARE MNGMENT TRAINING: CPT | Mod: CO

## 2023-10-17 PROCEDURE — 99233 SBSQ HOSP IP/OBS HIGH 50: CPT | Mod: ,,, | Performed by: STUDENT IN AN ORGANIZED HEALTH CARE EDUCATION/TRAINING PROGRAM

## 2023-10-17 PROCEDURE — 97535 SELF CARE MNGMENT TRAINING: CPT

## 2023-10-17 PROCEDURE — 21400001 HC TELEMETRY ROOM

## 2023-10-17 PROCEDURE — 99233 PR SUBSEQUENT HOSPITAL CARE,LEVL III: ICD-10-PCS | Mod: ,,, | Performed by: STUDENT IN AN ORGANIZED HEALTH CARE EDUCATION/TRAINING PROGRAM

## 2023-10-17 PROCEDURE — 85027 COMPLETE CBC AUTOMATED: CPT | Performed by: STUDENT IN AN ORGANIZED HEALTH CARE EDUCATION/TRAINING PROGRAM

## 2023-10-17 PROCEDURE — A4216 STERILE WATER/SALINE, 10 ML: HCPCS | Performed by: STUDENT IN AN ORGANIZED HEALTH CARE EDUCATION/TRAINING PROGRAM

## 2023-10-17 PROCEDURE — 92507 TX SP LANG VOICE COMM INDIV: CPT

## 2023-10-17 PROCEDURE — 63600175 PHARM REV CODE 636 W HCPCS: Mod: JZ,TB | Performed by: PHYSICIAN ASSISTANT

## 2023-10-17 PROCEDURE — 25000003 PHARM REV CODE 250: Performed by: INTERNAL MEDICINE

## 2023-10-17 PROCEDURE — 36415 COLL VENOUS BLD VENIPUNCTURE: CPT | Performed by: STUDENT IN AN ORGANIZED HEALTH CARE EDUCATION/TRAINING PROGRAM

## 2023-10-17 PROCEDURE — 97112 NEUROMUSCULAR REEDUCATION: CPT | Mod: CQ

## 2023-10-17 PROCEDURE — 25000003 PHARM REV CODE 250

## 2023-10-17 PROCEDURE — 25000003 PHARM REV CODE 250: Performed by: HOSPITALIST

## 2023-10-17 PROCEDURE — 25000003 PHARM REV CODE 250: Performed by: STUDENT IN AN ORGANIZED HEALTH CARE EDUCATION/TRAINING PROGRAM

## 2023-10-17 PROCEDURE — 25000003 PHARM REV CODE 250: Performed by: PHYSICIAN ASSISTANT

## 2023-10-17 PROCEDURE — 80069 RENAL FUNCTION PANEL: CPT | Performed by: STUDENT IN AN ORGANIZED HEALTH CARE EDUCATION/TRAINING PROGRAM

## 2023-10-17 PROCEDURE — 27000207 HC ISOLATION

## 2023-10-17 RX ORDER — AMLODIPINE BESYLATE 10 MG/1
10 TABLET ORAL DAILY
Status: DISCONTINUED | OUTPATIENT
Start: 2023-10-17 | End: 2023-10-18

## 2023-10-17 RX ADMIN — Medication 10 ML: at 12:10

## 2023-10-17 RX ADMIN — SODIUM CHLORIDE 500 ML: 9 INJECTION, SOLUTION INTRAVENOUS at 09:10

## 2023-10-17 RX ADMIN — SODIUM ZIRCONIUM CYCLOSILICATE 10 G: 10 POWDER, FOR SUSPENSION ORAL at 08:10

## 2023-10-17 RX ADMIN — APIXABAN 5 MG: 5 TABLET, FILM COATED ORAL at 09:10

## 2023-10-17 RX ADMIN — CEFIDEROCOL SULFATE TOSYLATE 1 G: 1 INJECTION, POWDER, FOR SOLUTION INTRAVENOUS at 06:10

## 2023-10-17 RX ADMIN — CLOPIDOGREL BISULFATE 75 MG: 75 TABLET ORAL at 08:10

## 2023-10-17 RX ADMIN — AMLODIPINE BESYLATE 10 MG: 10 TABLET ORAL at 09:10

## 2023-10-17 RX ADMIN — INSULIN ASPART 5 UNITS: 100 INJECTION, SOLUTION INTRAVENOUS; SUBCUTANEOUS at 08:10

## 2023-10-17 RX ADMIN — ACETAMINOPHEN 650 MG: 325 TABLET ORAL at 05:10

## 2023-10-17 RX ADMIN — ACETAMINOPHEN 650 MG: 325 TABLET ORAL at 06:10

## 2023-10-17 RX ADMIN — HYDRALAZINE HYDROCHLORIDE 100 MG: 50 TABLET ORAL at 03:10

## 2023-10-17 RX ADMIN — OXYCODONE HYDROCHLORIDE AND ACETAMINOPHEN 500 MG: 500 TABLET ORAL at 09:10

## 2023-10-17 RX ADMIN — HYDRALAZINE HYDROCHLORIDE 100 MG: 50 TABLET ORAL at 06:10

## 2023-10-17 RX ADMIN — TAMSULOSIN HYDROCHLORIDE 0.4 MG: 0.4 CAPSULE ORAL at 08:10

## 2023-10-17 RX ADMIN — PANTOPRAZOLE SODIUM 40 MG: 40 TABLET, DELAYED RELEASE ORAL at 08:10

## 2023-10-17 RX ADMIN — CEFIDEROCOL SULFATE TOSYLATE 1 G: 1 INJECTION, POWDER, FOR SOLUTION INTRAVENOUS at 03:10

## 2023-10-17 RX ADMIN — ACETAMINOPHEN 650 MG: 325 TABLET ORAL at 12:10

## 2023-10-17 RX ADMIN — APIXABAN 5 MG: 5 TABLET, FILM COATED ORAL at 08:10

## 2023-10-17 RX ADMIN — MICONAZOLE NITRATE: 20 POWDER TOPICAL at 08:10

## 2023-10-17 RX ADMIN — FLUCONAZOLE 200 MG: 200 TABLET ORAL at 08:10

## 2023-10-17 RX ADMIN — OXYCODONE HYDROCHLORIDE AND ACETAMINOPHEN 500 MG: 500 TABLET ORAL at 08:10

## 2023-10-17 RX ADMIN — ATORVASTATIN CALCIUM 80 MG: 40 TABLET, FILM COATED ORAL at 09:10

## 2023-10-17 RX ADMIN — THERA TABS 1 TABLET: TAB at 08:10

## 2023-10-17 RX ADMIN — INSULIN ASPART 5 UNITS: 100 INJECTION, SOLUTION INTRAVENOUS; SUBCUTANEOUS at 05:10

## 2023-10-17 RX ADMIN — MICONAZOLE NITRATE: 20 POWDER TOPICAL at 09:10

## 2023-10-17 RX ADMIN — SODIUM ZIRCONIUM CYCLOSILICATE 10 G: 10 POWDER, FOR SUSPENSION ORAL at 09:10

## 2023-10-17 RX ADMIN — Medication 10 ML: at 05:10

## 2023-10-17 RX ADMIN — CEFIDEROCOL SULFATE TOSYLATE 1 G: 1 INJECTION, POWDER, FOR SOLUTION INTRAVENOUS at 09:10

## 2023-10-17 RX ADMIN — GABAPENTIN 100 MG: 300 CAPSULE ORAL at 09:10

## 2023-10-17 RX ADMIN — GABAPENTIN 100 MG: 300 CAPSULE ORAL at 08:10

## 2023-10-17 RX ADMIN — INSULIN ASPART 5 UNITS: 100 INJECTION, SOLUTION INTRAVENOUS; SUBCUTANEOUS at 12:10

## 2023-10-17 RX ADMIN — Medication 10 ML: at 06:10

## 2023-10-17 RX ADMIN — SODIUM ZIRCONIUM CYCLOSILICATE 10 G: 10 POWDER, FOR SUSPENSION ORAL at 03:10

## 2023-10-17 RX ADMIN — HYDRALAZINE HYDROCHLORIDE 100 MG: 50 TABLET ORAL at 09:10

## 2023-10-17 NOTE — PT/OT/SLP PROGRESS
Speech Language Pathology Treatment    Patient Name:  Saji Castañeda   MRN:  [id number]  Admitting Diagnosis: Bacteremia due to Gram-negative bacteria    Recommendations:                 General Recommendations:  Cognitive-linguistic therapy  Diet recommendations:  Soft & Bite Sized Diet - IDDSI Level 6, Liquid Diet Level: Thin   Aspiration Precautions: Standard aspiration precautions   General Precautions: Standard, fall  Communication strategies:  none    Assessment:     Saji Castañeda is a 73 y.o. male with an SLP diagnosis of Cognitive-Linguistic Impairment.      Subjective     Pt awake and in agreement to all aspects of tx.   Patient goals: none stated     Pain/Comfort:  Pain Rating 1: 0/10    Respiratory Status: Room air    Objective:     Has the patient been evaluated by SLP for swallowing?   Yes  Keep patient NPO? No   Current Respiratory Status:    room air    Pt seen for ongoing cognitive-linguistic therapy.Pt was able to recall 2/3 items independently and 3/3 items with min verbal cues with a 2 minute delay. Pt followed 3 step commands independently in 3/5 trials and with moderate cues in 4/5 trials. Pt was able to name 6-8 items in a category in one minute across two trials, pt required minimal verbal cues to name items. Intermittent preseveration of named items in categories noted. Pt completed compare/contrast task with 70% accuracy independently and 100% accuracy with minimal verbal cues. SLP provided education regarding role of SLP, cog. Tx and ongoing SLP POC. Pt verbalized understanding.     Goals:   Multidisciplinary Problems       SLP Goals          Problem: SLP    Goal Priority Disciplines Outcome   SLP Goal     SLP Ongoing, Progressing   Description: Speech Language Pathology Goals  Goals expected to be met by 10/12    1. Pt will recall 3 novel items after 2 minute delay given mod cues.  2. Pt will complete immediate memory tasks with 70% accuracy given min cues.  3. Pt will follow 3-step commands  with 70% accuracy given mod cues.  4. Pt will list 7 items from a category in 1 minute given min cues.   5. Pt will participate in assessment for reading, writing, and visuospatial abilities.                               Plan:     Patient to be seen:  3 x/week   Plan of Care expires:  11/02/23  Plan of Care reviewed with:  patient   SLP Follow-Up:  Yes       Discharge recommendations:  Moderate Intensity Therapy   Barriers to Discharge:  None per speech     Time Tracking:     SLP Treatment Date:   10/17/23  Speech Start Time:  1235  Speech Stop Time:  1250     Speech Total Time (min):  15 min    Billable Minutes: Speech Therapy Individual 7 and Self Care/Home Management Training 8    10/17/2023

## 2023-10-17 NOTE — PROGRESS NOTES
[facility] - Intensive Care ([address] Medicine  Progress Note    Patient Name: Saji Castañeda  MRN: [id number]  Patient Class: IP- Inpatient   Admission Date: 10/2/2023  Length of Stay: 15 days  Attending Physician: Yakelin Ying MD  Primary Care Provider: [facility] -        Subjective:     Principal Problem:Bacteremia due to Gram-negative bacteria        HPI:  Saji Castañeda is a 73 year old Black man with hypertension, hyperlipidemia, diabetes mellitus type 2 with neuropathy, left ventricular concentric hypertrophy, paroxysmal atrial fibrillation (anticoagulated on apixaban), Mobitz type I atrioventricular heart block, chronic kidney disease stage 3, chronic venous hypertension, peripheral vascular disease, 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 He lives in [place], [place].               He presented to [facility] on 10/2/2023 from [facility] due to concerns of weakness and feeling like he was going to die. He reported no dizziness or headaches. He knew his name, where he was, and the current year but had trouble remembering which nursing home he came from. Blood glucose was in the 20s. He was given dextrose in the emergency department and glucose improved to 96 mg/dL. Labs showed acute kidney injury with creatinine of 2.6 mg/dL from baseline of 1.3 and lactic acid elevated at 2.29 mmol/L. He had complete heart block. He was admitted to Cardiology.      Overview/Hospital Course:  Saji Castañeda is a 73 year old Black man with hypertension, hyperlipidemia, diabetes mellitus type 2 with neuropathy, left ventricular concentric hypertrophy, chronic lower extremity osteomyelitis,  presented to [facility] due to concerns of weakness  and feeling like he was going to die. He had type 2 second-degree heart block. He was admitted to Cardiology.  However patient's blood pressure dropped significantly and was transferred to ICU for sepsis.  With PICC line verses acute osteomyelitis of the posterior aspect of the calcaneus as a possible source.. He was started on dobutamine, vancomycin, and piperacillin-tazobactam. Blood culture grew Acinetobacter baumannii and Klebsiella pneumoniae.Vascular Surgery suspected poor wound healing to be due to severe edema. They ordered ankle brachial indices but it was unable to be done due to the hardness of his skin. They did not recommend amputation based on their physical exam findings.  Infectious Disease changed piperacillin-tazobactam to cefidericol and fluconazole. He continued to have asymptomatic bradycardia. Foot wound culture grew Achromobacter xylosoxidans dentrificans and Candida albicans. Lower extremity arterial ultrasound suggested moderate stenosis of the right anterior tibial artery and severe stenosis of the right posterior tibial artery. Vascular Surgery reviewed this and felt it would not hinder his ability to heal.   He had acute on chronic renal failure.  Repeat EKG showed 2nd degree AV block replacing junctional rhythm. Electrophysiology recommended pacemaker placement. Electrophysiology reviewed his EKG on 10/8/2023 and noted 2nd degree heart block Mobitz I. Renal function did not improve. Fractional excretion of sodium was 1.5% suggesting intrinsic cause. Urinalysis showed pyuria and bacteriuria, similar to 2 other urinalyses done since admission, but urine culture again had no growth. Nephrology was consulted for persistent acute kidney injury and suspected ischemic acute tubular necrosis from hypoperfusion in setting of shock (on dobutamine) and symptomatic bradycardia with additional insult from sepsis (bacteremia) and vancomycin toxicity (trough ~27 on 10/4/2023).  Patient underwent  pacemaker placement on 10/12.  Tolerated procedure well.  No postop complications      Interval History:  Patient is comfortably resting on bed.  No overnight events.  Plan to discharge pt on iv abx.   working on arranging IV antibiotics.    tReview of Systems   Constitutional:  Negative for chills and fever.   Neurological:  Negative for seizures and syncope.     Objective:     Vital Signs (Most Recent):  Temp: 98.9 °F (37.2 °C) (10/17/23 1145)  Pulse: 82 (10/17/23 1145)  Resp: 17 (10/17/23 1145)  BP: (!) 144/71 (10/17/23 1145)  SpO2: 97 % (10/17/23 1145) Vital Signs (24h Range):  Temp:  [98.1 °F (36.7 °C)-99.4 °F (37.4 °C)] 98.9 °F (37.2 °C)  Pulse:  [76-96] 82  Resp:  [17-23] 17  SpO2:  [96 %-98 %] 97 %  BP: (144-179)/(71-82) 144/71     Weight: 112.5 kg (248 lb)  Body mass index is 36.62 kg/m².    Intake/Output Summary (Last 24 hours) at 10/17/2023 1330  Last data filed at 10/17/2023 0909  Gross per 24 hour   Intake 610 ml   Output 1950 ml   Net -1340 ml           Physical Exam  Vitals and nursing note reviewed.   Constitutional:       General: He is not in acute distress.     Appearance: He is well-developed. He is obese. He is not diaphoretic.   Musculoskeletal:      Right lower leg: Edema present.      Left lower leg: Edema present.   Skin:     General: Skin is warm and dry.      Coloration: Skin is not jaundiced or pale.   Neurological:      Mental Status: He is alert and oriented to person, place, and time. Mental status is at baseline.      Motor: No seizure activity.   Psychiatric:         Attention and Perception: Attention normal.         Mood and Affect: Affect normal.         Behavior: Behavior is not aggressive or combative.             Significant Labs: All pertinent labs within the past 24 hours have been reviewed.  Recent Labs   Lab 10/15/23  0420 10/16/23  0449 10/17/23  0538    146* 146*   K 4.4 4.1 3.8   * 113* 114*   CO2 22* 22* 21*   BUN 39* 38* 33*   CREATININE 3.0*  2.9* 2.7*   CALCIUM 8.2* 8.5* 8.4*           Significant Imaging: I have reviewed all pertinent imaging results/findings within the past 24 hours.        Assessment/Plan:      * Bacteremia due to Gram-negative bacteria   sepsis secondary to ESBL Klebsiella and  Acinetobacter bacteremia. likely source includes PICC line - also consider from left heel wound. PICC line removed 10/4/2023. Repeat blood cultures NGTD.      Cefidericol was added to blood culture report from 10/2, awaiting results of sensitivity report. Wound culture + C. Albicans and achromobacter xylosoxidans.         Recommendations:  1. Continue cefidericol 1g IV q8hr (CrCl 18- renally dosed) and fluconazole 200mg qd   2. No further vascular surgical plans at this time. Stating pt has ability to heal wounds. Recommend to continue frequent wound care, off loading, and nutritional support.    3. Plan to continue IV therapy until original end of care date for osteo (10/25/23),    Hyperkalemia  Resolved   K-6.2 on 10/12, (most likely hemolyzed sample )  1 dose of Lokelma was given prior to the procedure. 1 dose of calcium gluconate given and EKG was ordered, hyperkalemia per plan was initiated.   However Repeat potassium was 5.3. orders were discontinued.  We will only continue Lokelma for now        Cardiogenic shock        Complete heart block        Asymptomatic bacteriuria  Each urine culture has pyuria and bacteriuria. Urine culture on 10/2/2023 had no growth. Urine culture on 10/4/2023 grew multiple organisms with none in predominance. Urine culture on 10/9/2023 had no growth.     Blurred vision, left eye  Reports this to be chronic. Follow up with an ophthalmologist.    History of complete heart block  AV block, 2nd degree  Pacemaker placed on 10/12,  no postop complication    Paroxysmal atrial fibrillation  Continue home apixaban.     AV block, 2nd degree  Pacemaker placed on 10/12.    Chronic osteomyelitis        Nonhealing ulcer of right lower  leg with fat layer exposed  Edema of leg  Vascular Surgery believes edema contributes to poor wound healing. They did not recommend amputation. They recommended compression wrappings. Consulted Wound Care for this. Already on medical management for PAD with clopidrogel, apixaban, atorvastatin.     Chronic deep vein thrombosis (DVT) of right upper extremity  Continue home apixaban.    Peripheral arterial disease  Continue home clopidrogel and atorvastatin.  Evaluated by vascular surgery recommended that patient was not a revascularization candidate due to extensive disease, multiple comorbidities, debility/bed bound status and concern for noncompliance.    Acute on chronic renal failure  On admission, has worsened since. Monitor labs. FENa 1.5%. Occasional eosinophils. No obstruction on renal ultrasound. Appreciate Nephrology, who believe it is multifactorial.    YNES (acute kidney injury)  Patient with acute kidney injury/acute renal failure likely due to pre-renal azotemia due to dehydration YNES is currently improving. Baseline creatinine 2 - Labs reviewed- Renal function/electrolytes with Estimated Creatinine Clearance: 27.6 mL/min (A) (based on SCr of 2.9 mg/dL (H)). according to latest data. Monitor urine output and serial BMP and adjust therapy as needed. Avoid nephrotoxins and renally dose meds for GFR listed above.    Insulin dependent type 2 diabetes mellitus  Holding home glipizide. He takes insulin glargine 45 units HS and insulin aspart 12 units TID. Giving insulin detemir 25 units HS, insulin aspart 5 units TID and sliding scale. Monitor Accuchecks.    Edema of leg          VTE Risk Mitigation (From admission, onward)         Ordered     apixaban tablet 5 mg  2 times daily         10/11/23 0932     IP VTE HIGH RISK PATIENT  Once         10/02/23 1513     Place sequential compression device  Until discontinued         10/02/23 1513                Discharge Planning   OXANA: 10/18/2023     Code Status: Full  Code   Is the patient medically ready for discharge?: No    Reason for patient still in hospital (select all that apply): Pending disposition  Discharge Plan A: Home Health                  Yakelin Ying MD  Department of Hospital Medicine   [facility] - Intensive Care ([place])

## 2023-10-17 NOTE — PT/OT/SLP PROGRESS
"Occupational Therapy   Co-Treatment  Co-treatment performed due to patient's multiple deficits requiring two skilled therapists to appropriately and safely assess patient's strength and endurance while facilitating functional tasks in addition to accommodating for patient's activity tolerance.     Name: Saji Castañeda  MRN: [id number]  Admitting Diagnosis:  Bacteremia due to Gram-negative bacteria  5 Days Post-Op    Recommendations:     Discharge Recommendations: Moderate Intensity Therapy  Discharge Equipment Recommendations:  slide board  Barriers to discharge:       Assessment:     Saji Castañeda is a 73 y.o. male with a medical diagnosis of Bacteremia due to Gram-negative bacteria.  He presents with the following performance deficits affecting function: weakness, impaired functional mobility, gait instability, impaired endurance, decreased lower extremity function, decreased safety awareness, decreased coordination, impaired self care skills.     Pt agreeable to therapy and tolerated session well. Pt requiring significant assistance with transfers today. Pt attempting slide board transfer to bedside commode but was unsuucessful and attempted scoot pivot, Max A of 2 persons required. Pt reports feeling weak. Pt will participate in more transfer training with therapy to improve functional independence.      Rehab Prognosis:  Good; patient would benefit from acute skilled OT services to address these deficits and reach maximum level of function.       Plan:     Patient to be seen 3 x/week to address the above listed problems via self-care/home management, therapeutic activities, therapeutic exercises  Plan of Care Expires: 11/04/23  Plan of Care Reviewed with: patient    Subjective     Chief Complaint: weakness  Patient/Family Comments/goals: "I don't know what happen to me"  Pain/Comfort:  Pain Rating 1: 0/10  Pain Rating Post-Intervention 1: 0/10    Objective:     Communicated with: RN prior to session.  Patient " found HOB elevated with telemetry, peripheral IV upon OT entry to room.  A client care conference was completed by the OTR and the TAYLOR prior to treatment by the TAYLOR to discuss the patient's POC and current status.    General Precautions: Standard, fall    Orthopedic Precautions:RLE weight bearing as tolerated, LLE non weight bearing  Braces:  (darco)  Respiratory Status: Room air     Occupational Performance:     Bed Mobility:    Patient completed Supine to Sit with stand by assistance  Patient completed Sit to Supine with stand by assistance     Functional Mobility/Transfers:  Patient completed Toilet Transfer Squat Pivot and Slide Board technique with maximal assistance and of 2 persons with  drop arm commode and slide board x 2 trials. Slide board unsuccessful, pt going into scoot pivot.    Activities of Daily Living:  Grooming: supervision for facial care seated EOB  Lower Body Dressing: maximal assistance to don darco shoe on R foot  Toileting: maximal assistance for pericare while rolling in bed      [facility] 6 Click ADL: 17    Treatment & Education:  Pt educated on OT POC and frequency during hospital stay.   Pt educated on proper hand placement and techniques for slide board transfers to improve safety awareness.   Addressed all patient questions/concerns within TAYLOR scope of practice.     Patient left HOB elevated with all lines intact, call button in reach, and RN notified    GOALS:   Multidisciplinary Problems       Occupational Therapy Goals          Problem: Occupational Therapy    Goal Priority Disciplines Outcome Interventions   Occupational Therapy Goal     OT, PT/OT Ongoing, Progressing    Description: Goals to be met by: 10-15-23  Goals reviewed and revised and extended  on 10-16-23 to  be met by 10-30-23    Patient will increase functional independence with ADLs by performing:    UE Dressing with Set-up Assistance.  LE Dressing with Supervision in supine NOT MET 10-16  Grooming while seated with  Set-up Assistance. MET  Toileting from drop arm commode with Minimal Assistance for hygiene and clothing management. NOT MET 10-16  Supine to sit with Supervision. NOT MET 10-16    Squat pivot transfers with Min A in darco shoe with WB through R heel only in darco NOT MET  Goal Discontinued    New Goal: SBT to wheelchair with Min A    Toilet transfer to drop arm commode with Minimal Assistance.NOT MET    Revised: SBT to drop arm commode with Min A    Pt. To be I with HEP in BUE to improve level of endurance NOT MET                         Time Tracking:     OT Date of Treatment: 10/17/23  OT Start Time: 1431  OT Stop Time: 1510  OT Total Time (min): 39 min    Billable Minutes:Self Care/Home Management 25  Therapeutic Activity 14    OT/KATHLEEN: KATHLEEN     Number of KATHLEEN visits since last OT visit: 1    10/17/2023

## 2023-10-17 NOTE — PLAN OF CARE
Problem: Diabetes Comorbidity  Goal: Blood Glucose Level Within Targeted Range  Outcome: Ongoing, Progressing     Problem: Glycemic Control Impaired (Sepsis/Septic Shock)  Goal: Blood Glucose Level Within Desired Range  Outcome: Ongoing, Progressing   NS 500cc bolus given. No significant changes noted. Wound care performed on R.heel and sacrum. Bed locked and in lowest position. Call light in reach.

## 2023-10-17 NOTE — PT/OT/SLP PROGRESS
Physical Therapy   Co-Treatment with KATHLEEN    Patient Name:  Saji Castañeda   MRN:  [id number]    Recommendations:     Discharge Recommendations: Moderate Intensity Therapy  Discharge Equipment Recommendations: none  Barriers to discharge:  increase assistance required     Assessment:     Saji Castañeda is a 73 y.o. male admitted with a medical diagnosis of Bacteremia due to Gram-negative bacteria.  He presents with the following impairments/functional limitations: weakness, impaired endurance, impaired functional mobility, impaired balance, impaired coordination, pain, decreased safety awareness, decreased coordination, orthopedic precautions, decreased lower extremity function.  Pt was agreeable to therapy, but required significantly more assistance with transfer today. Pt completed commode transfer with drop arm commode and attempted with slide board today. Pt demonstrated more difficulty with motor planning and sequencing and required more cues to maintain weight bearing precautions. Pt will participate in more transfer training with therapy to improve functional independence.      Rehab Prognosis: Fair; patient would benefit from acute skilled PT services to address these deficits and reach maximum level of function.    Recent Surgery: Procedure(s) (LRB):  [id number] (N/A) 5 Days Post-Op    Plan:     During this hospitalization, patient to be seen 3 x/week to address the identified rehab impairments via gait training, therapeutic activities, therapeutic exercises, neuromuscular re-education and progress toward the following goals:    Plan of Care Expires:  11/02/23    Subjective     Chief Complaint: needing to have a BM  Pain/Comfort:  Pain Rating 1:  (did not rate)  Pain Rating Post-Intervention 1: 0/10      Objective:     Communicated with RN prior to session.  Patient found HOB elevated with telemetry upon PT entry to room.     General Precautions: Standard, fall  Orthopedic Precautions: RLE  partial weight bearing, LLE non weight bearing (RLE partial WB through heel)  Braces:  (R Darco shoe)  Respiratory Status: Room air     Functional Mobility:  Additional staff present: KATHLEEN  Co-treatment performed due to patient's multiple deficits requiring two skilled therapists to appropriately and safely assess patient's strength and endurance while facilitating functional tasks in addition to accommodating for patient's activity tolerance  Bed Mobility:   Rolling/Turning to Left/Right: stand by assistance with bed rails   Scooting   to HOB: via supine bridge: contact guard assistance  Cues to maintain LLE NWB  to EOB: contact guard assistance  Along EOB: contact guard assistance, cues for sequencing and maintain LLE NWB   Supine to Sit: stand by assistance; HOB elevated  Sit to Supine: stand by assistance  Transfers:   Bed <> Commode Transfer: maximal assistance and of 2 persons with no assistive device and slide board using Scoot Pivot technique  1st attempted with slide board, but noted increase difficulty with motor planning and sequencing when using slide board   Max cues for technique and maintaining LLE NWB status. Pt would attempt to hold RLE up and push thru LLE.   Scoot pivot to the right (to commode), then to the left (to bed)   Balance:   Static/Dynamic sitting EOB balance: SBA  Assisted with donning DARCO shoe prior to transfers.    AM-PAC 6 CLICK MOBILITY  Turning over in bed (including adjusting bedclothes, sheets and blankets)?: 4  Sitting down on and standing up from a chair with arms (e.g., wheelchair, bedside commode, etc.): 2  Moving from lying on back to sitting on the side of the bed?: 3  Moving to and from a bed to a chair (including a wheelchair)?: 2  Need to walk in hospital room?: 1  Climbing 3-5 steps with a railing?: 1  Basic Mobility Total Score: 13       Treatment & Education:  Increase time for toileting.   Patient educated on role of therapy, goals of session, and benefits of out of  bed mobility.   Instructed on use of call button and importance of calling nursing staff for assistance with mobility   Questions/concerns addressed within PTA scope of practice    Patient left HOB elevated with all lines intact, call button in reach, and nurse notified.    GOALS:   Multidisciplinary Problems       Physical Therapy Goals          Problem: Physical Therapy    Goal Priority Disciplines Outcome Goal Variances Interventions   Physical Therapy Goal     PT, PT/OT Ongoing, Progressing     Description: Goals to be met by: 2023    Patient will increase functional independence with mobility by performin. Supine to sit with Dana- met 10/9  2. Bed to chair transfer with Contact Guard Assistance using LRAD - met 10/9  2a. Chair to bed transfer with SBA using LRAD including slideboard - not met  3. Wheelchair propulsion x100 feet with Supervision not met  4. Lower extremity exercise program x10 reps per handout, with independence - not met                         Time Tracking:     PT Received On: 10/17/23  PT Start Time: 1431     PT Stop Time: 1510  PT Total Time (min): 39 min     Billable Minutes: Therapeutic Activity 23 and Neuromuscular Re-education 15    Treatment Type: Treatment  PT/PTA: PTA     Number of PTA visits since last PT visit: 1     10/17/2023

## 2023-10-17 NOTE — ANESTHESIA POSTPROCEDURE EVALUATION
Anesthesia Post Evaluation    Patient: Saji Castañeda    Procedure(s) Performed: Procedure(s) (LRB):  [id number] (N/A)    Final Anesthesia Type: general      Patient location during evaluation: PACU  Patient participation: Yes- Able to Participate  Level of consciousness: awake and alert  Post-procedure vital signs: reviewed and stable  Pain management: adequate  Airway patency: patent    PONV status at discharge: No PONV  Anesthetic complications: no      Cardiovascular status: blood pressure returned to baseline  Respiratory status: unassisted, spontaneous ventilation and room air  Hydration status: euvolemic            Vitals Value   /82   Temp 37.4 °C (99.3 °F)   Pulse 76   Resp 18   SpO2 97 %         No case tracking events are documented in the log.      Pain/Wing Score: Pain Rating Prior to Med Admin: 3

## 2023-10-17 NOTE — PLAN OF CARE
Patient needs to be discharged on IV antibiotics.   notified on Saturday.  Working on arranging IV antibiotics.

## 2023-10-17 NOTE — PLAN OF CARE
[facility] - Intensive Care ([facility])  Discharge Reassessment    Primary Care Provider: [facility] -    Expected Discharge Date: 10/18/2023    Reassessment (most recent)       Discharge Reassessment - 10/17/23 1642          Discharge Reassessment    Assessment Type Discharge Planning Reassessment (P)      Did the patient's condition or plan change since previous assessment? Yes (P)      Discharge Plan discussed with: Patient (P)      Communicated OXANA with patient/caregiver Yes (P)      Discharge Plan A Home Health (P)    IV infusion    Discharge Plan B Skilled Nursing Facility (P)      DME Needed Upon Discharge  none (P)      Transition of Care Barriers No family/friends to help (P)    Limited support    Why the patient remains in the hospital Requires continued medical care (P)         Post-Acute Status    Post-Acute Authorization Home Health;IV Infusion;Placement (P)      Post-Acute Placement Status Referrals Sent (P)      Home Health Status Referrals Sent (P)      Coverage [employer] MANAGED MEDICARE - [employer] [facility] HMO PPO SPECIAL NEEDS (P)      IV Infusion Status Referral(s) sent (P)                                            AMAYA Toledo, LMSW  [facility]  Case Management  Ext. [phone]

## 2023-10-17 NOTE — ASSESSMENT & PLAN NOTE
Plan for leadless pacemaker insertion today   Labs reviewed, grossly unremarkable  Hgb stable at 11.2  UA with large blood, trace LE and wbc 6-10 (most likely contaminated with bacteria). Pt without any urinary symptoms. Will not treat at this time, urine Cx: pending.  US pelvis: Findings suggestive for uterine adenomyosis. Endometrium maximally measures 7 mm, within normal limits. Bilateral ovarian iso to hypoechoic lesions. Differential considerations include hemorrhagic cysts versus endometriomas or teratomas. Consider follow-up pelvic ultrasound in 8-12 weeks versus pelvic MRI with IV contrast to further assess.    All pertinent results explained to patient. Bleeding precautions given.  Instructed to f/u with her GYN (Sierra Heller) within 1 week and explained that she will need a repeat US pelvis in 8-12 weeks versus pelvic MRI with IV contrast to further assess.  Strict ED return precautions given if any new or worsening symptoms.

## 2023-10-17 NOTE — SUBJECTIVE & OBJECTIVE
Interval History:  Patient is comfortably resting on bed.  No overnight events.  Plan to discharge pt on iv abx.   working on arranging IV antibiotics.    tReview of Systems   Constitutional:  Negative for chills and fever.   Neurological:  Negative for seizures and syncope.     Objective:     Vital Signs (Most Recent):  Temp: 98.9 °F (37.2 °C) (10/17/23 1145)  Pulse: 82 (10/17/23 1145)  Resp: 17 (10/17/23 1145)  BP: (!) 144/71 (10/17/23 1145)  SpO2: 97 % (10/17/23 1145) Vital Signs (24h Range):  Temp:  [98.1 °F (36.7 °C)-99.4 °F (37.4 °C)] 98.9 °F (37.2 °C)  Pulse:  [76-96] 82  Resp:  [17-23] 17  SpO2:  [96 %-98 %] 97 %  BP: (144-179)/(71-82) 144/71     Weight: 112.5 kg (248 lb)  Body mass index is 36.62 kg/m².    Intake/Output Summary (Last 24 hours) at 10/17/2023 1330  Last data filed at 10/17/2023 0909  Gross per 24 hour   Intake 610 ml   Output 1950 ml   Net -1340 ml           Physical Exam  Vitals and nursing note reviewed.   Constitutional:       General: He is not in acute distress.     Appearance: He is well-developed. He is obese. He is not diaphoretic.   Musculoskeletal:      Right lower leg: Edema present.      Left lower leg: Edema present.   Skin:     General: Skin is warm and dry.      Coloration: Skin is not jaundiced or pale.   Neurological:      Mental Status: He is alert and oriented to person, place, and time. Mental status is at baseline.      Motor: No seizure activity.   Psychiatric:         Attention and Perception: Attention normal.         Mood and Affect: Affect normal.         Behavior: Behavior is not aggressive or combative.             Significant Labs: All pertinent labs within the past 24 hours have been reviewed.  Recent Labs   Lab 10/15/23  0420 10/16/23  0449 10/17/23  0538    146* 146*   K 4.4 4.1 3.8   * 113* 114*   CO2 22* 22* 21*   BUN 39* 38* 33*   CREATININE 3.0* 2.9* 2.7*   CALCIUM 8.2* 8.5* 8.4*           Significant Imaging: I have reviewed all  pertinent imaging results/findings within the past 24 hours.

## 2023-10-18 LAB
ALBUMIN SERPL BCP-MCNC: 2.1 G/DL (ref 3.5–5.2)
ANION GAP SERPL CALC-SCNC: 12 MMOL/L (ref 8–16)
BUN SERPL-MCNC: 31 MG/DL (ref 8–23)
CALCIUM SERPL-MCNC: 8.3 MG/DL (ref 8.7–10.5)
CHLORIDE SERPL-SCNC: 111 MMOL/L (ref 95–110)
CO2 SERPL-SCNC: 22 MMOL/L (ref 23–29)
CREAT SERPL-MCNC: 2.8 MG/DL (ref 0.5–1.4)
ERYTHROCYTE [DISTWIDTH] IN BLOOD BY AUTOMATED COUNT: 19.5 % (ref 11.5–14.5)
EST. GFR  (NO RACE VARIABLE): 23 ML/MIN/1.73 M^2
GLUCOSE SERPL-MCNC: 144 MG/DL (ref 70–110)
HCT VFR BLD AUTO: 22.4 % (ref 40–54)
HGB BLD-MCNC: 6.9 G/DL (ref 14–18)
MCH RBC QN AUTO: 27.8 PG (ref 27–31)
MCHC RBC AUTO-ENTMCNC: 30.8 G/DL (ref 32–36)
MCV RBC AUTO: 90 FL (ref 82–98)
PHOSPHATE SERPL-MCNC: 2.9 MG/DL (ref 2.7–4.5)
PLATELET # BLD AUTO: 262 K/UL (ref 150–450)
PMV BLD AUTO: 10.8 FL (ref 9.2–12.9)
POCT GLUCOSE: 132 MG/DL (ref 70–110)
POCT GLUCOSE: 188 MG/DL (ref 70–110)
POCT GLUCOSE: 189 MG/DL (ref 70–110)
POTASSIUM SERPL-SCNC: 3.4 MMOL/L (ref 3.5–5.1)
RBC # BLD AUTO: 2.48 M/UL (ref 4.6–6.2)
SODIUM SERPL-SCNC: 145 MMOL/L (ref 136–145)
WBC # BLD AUTO: 6.71 K/UL (ref 3.9–12.7)

## 2023-10-18 PROCEDURE — 25000003 PHARM REV CODE 250: Performed by: HOSPITALIST

## 2023-10-18 PROCEDURE — 36415 COLL VENOUS BLD VENIPUNCTURE: CPT | Performed by: STUDENT IN AN ORGANIZED HEALTH CARE EDUCATION/TRAINING PROGRAM

## 2023-10-18 PROCEDURE — 80069 RENAL FUNCTION PANEL: CPT | Performed by: STUDENT IN AN ORGANIZED HEALTH CARE EDUCATION/TRAINING PROGRAM

## 2023-10-18 PROCEDURE — 99232 SBSQ HOSP IP/OBS MODERATE 35: CPT | Mod: ,,, | Performed by: STUDENT IN AN ORGANIZED HEALTH CARE EDUCATION/TRAINING PROGRAM

## 2023-10-18 PROCEDURE — 63600175 PHARM REV CODE 636 W HCPCS: Mod: JZ,TB | Performed by: PHYSICIAN ASSISTANT

## 2023-10-18 PROCEDURE — 25000003 PHARM REV CODE 250: Performed by: PHYSICIAN ASSISTANT

## 2023-10-18 PROCEDURE — 85027 COMPLETE CBC AUTOMATED: CPT | Performed by: STUDENT IN AN ORGANIZED HEALTH CARE EDUCATION/TRAINING PROGRAM

## 2023-10-18 PROCEDURE — 27000207 HC ISOLATION

## 2023-10-18 PROCEDURE — A4216 STERILE WATER/SALINE, 10 ML: HCPCS | Performed by: STUDENT IN AN ORGANIZED HEALTH CARE EDUCATION/TRAINING PROGRAM

## 2023-10-18 PROCEDURE — 25000003 PHARM REV CODE 250: Performed by: INTERNAL MEDICINE

## 2023-10-18 PROCEDURE — 21400001 HC TELEMETRY ROOM

## 2023-10-18 PROCEDURE — 25000003 PHARM REV CODE 250: Performed by: STUDENT IN AN ORGANIZED HEALTH CARE EDUCATION/TRAINING PROGRAM

## 2023-10-18 PROCEDURE — 99232 PR SUBSEQUENT HOSPITAL CARE,LEVL II: ICD-10-PCS | Mod: ,,, | Performed by: STUDENT IN AN ORGANIZED HEALTH CARE EDUCATION/TRAINING PROGRAM

## 2023-10-18 PROCEDURE — 25000003 PHARM REV CODE 250

## 2023-10-18 RX ORDER — FLUCONAZOLE 200 MG/1
200 TABLET ORAL DAILY
Qty: 7 TABLET | Refills: 0 | Status: SHIPPED | OUTPATIENT
Start: 2023-10-18 | End: 2023-10-25

## 2023-10-18 RX ORDER — POTASSIUM CHLORIDE 20 MEQ/1
40 TABLET, EXTENDED RELEASE ORAL ONCE
Status: COMPLETED | OUTPATIENT
Start: 2023-10-18 | End: 2023-10-18

## 2023-10-18 RX ORDER — NIFEDIPINE 30 MG/1
60 TABLET, EXTENDED RELEASE ORAL DAILY
Status: DISCONTINUED | OUTPATIENT
Start: 2023-10-18 | End: 2023-10-19 | Stop reason: HOSPADM

## 2023-10-18 RX ORDER — NIFEDIPINE 60 MG/1
60 TABLET, EXTENDED RELEASE ORAL DAILY
Qty: 30 TABLET | Refills: 2 | Status: ON HOLD | OUTPATIENT
Start: 2023-10-19 | End: 2024-04-03 | Stop reason: HOSPADM

## 2023-10-18 RX ADMIN — CEFIDEROCOL SULFATE TOSYLATE 1 G: 1 INJECTION, POWDER, FOR SOLUTION INTRAVENOUS at 06:10

## 2023-10-18 RX ADMIN — MICONAZOLE NITRATE: 20 POWDER TOPICAL at 09:10

## 2023-10-18 RX ADMIN — APIXABAN 5 MG: 5 TABLET, FILM COATED ORAL at 09:10

## 2023-10-18 RX ADMIN — OXYCODONE HYDROCHLORIDE AND ACETAMINOPHEN 500 MG: 500 TABLET ORAL at 09:10

## 2023-10-18 RX ADMIN — HYDRALAZINE HYDROCHLORIDE 100 MG: 50 TABLET ORAL at 09:10

## 2023-10-18 RX ADMIN — CEFIDEROCOL SULFATE TOSYLATE 1 G: 1 INJECTION, POWDER, FOR SOLUTION INTRAVENOUS at 03:10

## 2023-10-18 RX ADMIN — POTASSIUM CHLORIDE 40 MEQ: 1500 TABLET, EXTENDED RELEASE ORAL at 09:10

## 2023-10-18 RX ADMIN — CLOPIDOGREL BISULFATE 75 MG: 75 TABLET ORAL at 09:10

## 2023-10-18 RX ADMIN — GABAPENTIN 100 MG: 300 CAPSULE ORAL at 09:10

## 2023-10-18 RX ADMIN — TAMSULOSIN HYDROCHLORIDE 0.4 MG: 0.4 CAPSULE ORAL at 09:10

## 2023-10-18 RX ADMIN — PANTOPRAZOLE SODIUM 40 MG: 40 TABLET, DELAYED RELEASE ORAL at 09:10

## 2023-10-18 RX ADMIN — SODIUM ZIRCONIUM CYCLOSILICATE 10 G: 10 POWDER, FOR SUSPENSION ORAL at 09:10

## 2023-10-18 RX ADMIN — Medication 10 ML: at 01:10

## 2023-10-18 RX ADMIN — Medication 10 ML: at 12:10

## 2023-10-18 RX ADMIN — INSULIN ASPART 5 UNITS: 100 INJECTION, SOLUTION INTRAVENOUS; SUBCUTANEOUS at 09:10

## 2023-10-18 RX ADMIN — FLUCONAZOLE 200 MG: 200 TABLET ORAL at 09:10

## 2023-10-18 RX ADMIN — INSULIN ASPART 5 UNITS: 100 INJECTION, SOLUTION INTRAVENOUS; SUBCUTANEOUS at 05:10

## 2023-10-18 RX ADMIN — ACETAMINOPHEN 650 MG: 325 TABLET ORAL at 05:10

## 2023-10-18 RX ADMIN — ACETAMINOPHEN 650 MG: 325 TABLET ORAL at 06:10

## 2023-10-18 RX ADMIN — CEFIDEROCOL SULFATE TOSYLATE 1 G: 1 INJECTION, POWDER, FOR SOLUTION INTRAVENOUS at 09:10

## 2023-10-18 RX ADMIN — HYDRALAZINE HYDROCHLORIDE 100 MG: 50 TABLET ORAL at 03:10

## 2023-10-18 RX ADMIN — INSULIN ASPART 5 UNITS: 100 INJECTION, SOLUTION INTRAVENOUS; SUBCUTANEOUS at 12:10

## 2023-10-18 RX ADMIN — HYDRALAZINE HYDROCHLORIDE 100 MG: 50 TABLET ORAL at 06:10

## 2023-10-18 RX ADMIN — ATORVASTATIN CALCIUM 80 MG: 40 TABLET, FILM COATED ORAL at 09:10

## 2023-10-18 RX ADMIN — Medication 10 ML: at 06:10

## 2023-10-18 RX ADMIN — ACETAMINOPHEN 650 MG: 325 TABLET ORAL at 01:10

## 2023-10-18 RX ADMIN — THERA TABS 1 TABLET: TAB at 09:10

## 2023-10-18 RX ADMIN — AMLODIPINE BESYLATE 10 MG: 10 TABLET ORAL at 09:10

## 2023-10-18 RX ADMIN — NIFEDIPINE 60 MG: 30 TABLET, FILM COATED, EXTENDED RELEASE ORAL at 12:10

## 2023-10-18 NOTE — PROGRESS NOTES
"[facility] - [facility] ([facility]-)  Adult Nutrition  Progress Note    SUMMARY       Recommendations    Continue Diabetic diet   If PO intake <50%, add Boost glucose control BID   RD to monitor and follow    Goals: to meet % of EEN/EPN by next RD f/u  Nutrition Goal Status: progressing towards goal  Communication of RD Recs:  (POC)    Assessment and Plan    Nutrition Problem  Increased PRO needs     Related to (etiology):   Increased physiological needs     Signs and Symptoms (as evidenced by):   Multi skin wounds on left and right feet; chronic osteomyelitis.       Interventions/Recommendations (treatment strategy):  Collaboration of nutritional care with other providers.  ONS+ MVI+ VitC     Nutrition Diagnosis Status:   Continue    Reason for Assessment    Reason For Assessment: RD follow-up  Diagnosis: infection/sepsis (Bacteremia due to Gram-negative bacteria)  Relevant Medical History: afib, T2DM, PVD s/p left foot amputation, chronic osteomyelitis, HTN, HLD, CKD3  Interdisciplinary Rounds: did not attend    General Information Comments:   RD f/u. Pt reports good appetite. Tolerating % meals. Denies N/V/D/C. Denies chewing/swallowing difficulties. Per wt hx,  lb, noted 9% wt loss x 8 months. NFPE completed today, pt appears nourished. At risk of malnutrition.     Nutrition Discharge Planning: adequate intake    Nutrition Risk Screen    Nutrition Risk Screen: no indicators present    Nutrition/Diet History    Spiritual, Cultural Beliefs, Uatsdin Practices, Values that Affect Care: no    Anthropometrics    Temp: 98.6 °F (37 °C)  Height: 5' 9" (175.3 cm)  Height (inches): 69 in  Weight Method: Bed Scale  Weight: 112.5 kg (248 lb)  Weight (lb): 248 lb  Ideal Body Weight (IBW), Male: 160 lb  % Ideal Body Weight, Male (lb): 155 %  BMI (Calculated): 36.6  BMI Grade: 35 - 39.9 - obesity - grade II       Lab/Procedures/Meds    Pertinent Labs Reviewed: reviewed  Pertinent Labs Comments: glucose " 144, K 3.4, BUn 31, Cr 2.8, albumin 2.1, eGFR 23.0  Pertinent Medications Reviewed: reviewed  Pertinent Medications Comments: insulin, D10, vit C, pantoprazole, atorvastatin    Estimated/Assessed Needs    Weight Used For Calorie Calculations: 112.5 kg (248 lb 0.3 oz)  Energy Calorie Requirements (kcal): 1855 kcal  Energy Need Method: Harlan-St Jeor (MSJ x 1.0- d/t obesity)  Protein Requirements: 135- 169g (1.2-1.5g/kg d/t skin wounds present)  Weight Used For Protein Calculations: 112.5 kg (248 lb 0.3 oz)  Fluid Requirements (mL): 1ml/1kcal or per MD  Estimated Fluid Requirement Method: RDA Method  RDA Method (mL): 1855  CHO Requirement: 232g    Nutrition Prescription Ordered    Current Diet Order: Diabetic 2000 kcal    Evaluation of Received Nutrient/Fluid Intake    I/O: - 3.4 L since 10/4  Energy Calories Required: not meeting needs  Protein Required: not meeting needs  Fluid Required:  (as per MD)  Comments: LBM 10/13  Tolerance: tolerating  % Intake of Estimated Energy Needs: 75 - 100 %  % Meal Intake: 75 - 100 %    Nutrition Risk    Level of Risk/Frequency of Follow-up:  (RD to f/u x1-2/week)     Monitor and Evaluation    Food and Nutrient Intake: energy intake, food and beverage intake  Food and Nutrient Adminstration: diet order  Physical Activity and Function: nutrition-related ADLs and IADLs  Anthropometric Measurements: weight, weight change, body mass index  Biochemical Data, Medical Tests and Procedures: electrolyte and renal panel, gastrointestinal profile, glucose/endocrine profile, inflammatory profile, lipid profile  Nutrition-Focused Physical Findings: overall appearance, skin     Nutrition Follow-Up    RD Follow-up?: Yes

## 2023-10-18 NOTE — PLAN OF CARE
10/18/23 1632   Post-Acute Status   Post-Acute Authorization Placement   Post-Acute Placement Status Pending post-acute provider review/more information requested   Coverage [employer] MANAGED MEDICARE - [employer] [facility] HMO PPO SPECIAL NEEDS   Discharge Plan   Discharge Plan A Home Health   Discharge Plan B Skilled Nursing Facility     SW met with patient at the bedside to review dc plan. SW advised patient that referrals were sent for home infusion and HH. SW advised patient that home infusion is no longer recommended due to infusion company advising of level of support needed to administer IV abx at home. SW asked patient if he is willing to consider SNF due to concerns regarding home infusion and patient agreeable to dc to [facility] for IV infusion. Patient states that he was recently at [facility] and request that SW move forward with SNF placement at [facility].     QUINCY notified patient that referral sent to [facility]. Patent's dc to [facility] pending post-acute provider review.                  Nathan Figueroa, AMAYA, LMSW  [facility]  Case Management  Ext. [phone]

## 2023-10-18 NOTE — PLAN OF CARE
Problem: Nutrition Impaired (Sepsis/Septic Shock)  Goal: Optimal Nutrition Intake  Outcome: Ongoing, Progressing     Problem: Nutrition Impaired (Sepsis/Septic Shock)  Goal: Optimal Nutrition Intake  Outcome: Ongoing, Progressing     Problem: Infection  Goal: Absence of Infection Signs and Symptoms  Outcome: Ongoing, Progressing     Problem: Skin Injury Risk Increased  Goal: Skin Health and Integrity  Outcome: Ongoing, Progressing     Problem: Skin Injury Risk Increased  Goal: Skin Health and Integrity  Outcome: Ongoing, Progressing     Problem: Impaired Wound Healing  Goal: Optimal Wound Healing  Outcome: Ongoing, Progressing     Problem: Impaired Wound Healing  Goal: Optimal Wound Healing  Outcome: Ongoing, Progressing     Problem: Fluid and Electrolyte Imbalance (Acute Kidney Injury/Impairment)  Goal: Fluid and Electrolyte Balance  Outcome: Ongoing, Progressing     Problem: Fluid and Electrolyte Imbalance (Acute Kidney Injury/Impairment)  Goal: Fluid and Electrolyte Balance  Outcome: Ongoing, Progressing     Problem: Oral Intake Inadequate (Acute Kidney Injury/Impairment)  Goal: Optimal Nutrition Intake  Outcome: Ongoing, Progressing

## 2023-10-18 NOTE — ASSESSMENT & PLAN NOTE
Patient with acute kidney injury/acute renal failure likely due to pre-renal azotemia due to dehydration YNES is currently improving. Baseline creatinine 2 - Labs reviewed- Renal function/electrolytes with Estimated Creatinine Clearance: 28.6 mL/min (A) (based on SCr of 2.8 mg/dL (H)). according to latest data. Monitor urine output and serial BMP and adjust therapy as needed. Avoid nephrotoxins and renally dose meds for GFR listed above.

## 2023-10-18 NOTE — SUBJECTIVE & OBJECTIVE
Interval History:  Patient is comfortably resting on bed.  No overnight events.      tReview of Systems   Constitutional:  Negative for chills and fever.   Neurological:  Negative for seizures and syncope.     Objective:     Vital Signs (Most Recent):  Temp: 99 °F (37.2 °C) (10/18/23 1203)  Pulse: 74 (10/18/23 1203)  Resp: 19 (10/18/23 1203)  BP: (!) 141/63 (10/18/23 1203)  SpO2: 96 % (10/18/23 1203) Vital Signs (24h Range):  Temp:  [98 °F (36.7 °C)-99.3 °F (37.4 °C)] 99 °F (37.2 °C)  Pulse:  [73-84] 74  Resp:  [18-20] 19  SpO2:  [92 %-98 %] 96 %  BP: (141-179)/(63-74) 141/63     Weight: 112.5 kg (248 lb)  Body mass index is 36.62 kg/m².    Intake/Output Summary (Last 24 hours) at 10/18/2023 1232  Last data filed at 10/18/2023 1230  Gross per 24 hour   Intake 4486.35 ml   Output 1375 ml   Net 3111.35 ml           Physical Exam  Vitals and nursing note reviewed.   Constitutional:       General: He is not in acute distress.     Appearance: He is well-developed. He is obese. He is not diaphoretic.   Musculoskeletal:      Right lower leg: Edema present.      Left lower leg: Edema present.   Skin:     General: Skin is warm and dry.      Coloration: Skin is not jaundiced or pale.   Neurological:      Mental Status: He is alert and oriented to person, place, and time. Mental status is at baseline.      Motor: No seizure activity.   Psychiatric:         Attention and Perception: Attention normal.         Mood and Affect: Affect normal.         Behavior: Behavior is not aggressive or combative.             Significant Labs: All pertinent labs within the past 24 hours have been reviewed.  Recent Labs   Lab 10/16/23  0449 10/17/23  0538 10/18/23  0209   * 146* 145   K 4.1 3.8 3.4*   * 114* 111*   CO2 22* 21* 22*   BUN 38* 33* 31*   CREATININE 2.9* 2.7* 2.8*   CALCIUM 8.5* 8.4* 8.3*           Significant Imaging: I have reviewed all pertinent imaging results/findings within the past 24 hours.

## 2023-10-18 NOTE — PLAN OF CARE
NURSING HOME ORDERS    10/19/2023  [facility]  [facility] - INTENSIVE [facility] ([facility]  [address]  [facility] [address]  Dept: [phone]  Loc: [phone]     Admit to Nursing Home:      Diagnoses:  Active Hospital Problems    Diagnosis  POA    *Bacteremia due to Gram-negative bacteria [R78.81]  Yes    Complete heart block [I44.2]  Yes    Asymptomatic bacteriuria [R82.71]  Yes    Blurred vision, left eye [H53.8]  Yes     Chronic    Paroxysmal atrial fibrillation [I48.0]  No     Chronic    History of complete heart block [Z86.79]  Not Applicable     Chronic    AV block, 2nd degree [I44.1]  Yes    Chronic osteomyelitis [M86.60]  Yes    Nonhealing ulcer of right lower leg with fat layer exposed [L97.912]  Yes    Chronic deep vein thrombosis (DVT) of right upper extremity [I82.721]  Yes     Chronic    Peripheral arterial disease [I73.9]  Yes     Chronic     - long standing wounds with skin graft to left TMA in [id number]; LE angio in 2014 with patent left CFA, SFA, PFA with 3V run off on LE angiogram by Vascular surgery  - CTA LE with run off 6/2022 with moderate to high grade disease of right popliteal with severely diseased AT, PT, peroneal and multiple occlusion of left popliteal with occluded PT and peroneal and short segment occlusion of AT- patient denies any previous intervention  - BLE arterial ultrasound 3/2023 with similar findings suggestive of bilateral popliteal and BTK disease; seen by Vascular surgery at [facility] with recommendation for amputation- patient declined  - transferred to [facility] 05/2023 for evaluation for revascularization; extensive disease and multiple co morbidities along with debility/bed bound status and concern for non compliance, deemed not a  Revascularization candidate and placed on statin and Plavix; no ASA given risk of bleeding with triple therapy        Acute on chronic renal failure [N17.9, N18.9]  Yes    Insulin dependent type 2 diabetes  mellitus [E11.9, Z79.4]  Not Applicable     Chronic    Edema of leg [R60.0]  Yes     Chronic      Resolved Hospital Problems    Diagnosis Date Resolved POA    Hyperkalemia [E87.5] 10/19/2023 No    Cardiogenic shock [R57.0] 10/19/2023 Yes    Sepsis [A41.9] 10/05/2023 Yes    Palliative care encounter [Z51.5] 10/04/2023 Not Applicable    Goals of care, counseling/discussion [Z71.89] 10/19/2023 Not Applicable       Patient is homebound due to:  Bacteremia due to Gram-negative bacteria    Allergies:Review of patient's allergies indicates:  No Known Allergies    Vitals:  Routine    Diet: renal diet    Activities:   Activity as tolerated    Goals of Care Treatment Preferences:  Code Status: Full Code    Health care agent: January Nicholson [phone]  Health care agent number: [phone]          What is most important right now is to focus on avoiding the hospital, remaining as independent as possible.        Labs:  bmp in 1 week    Nursing Precautions:  Fall    Consults:   PT to evaluate and treat- 3 times a week and OT to evaluate and treat- 3 times a week     Miscellaneous Care: Wound Care: yes:  Foot Ulcer:  Location:        Apply the following to wound:  Daily - R heel - cleanse gently w/ NS, pat dry. Apply betadine and allow to dry before applying foam dressing.     Q3 days/PRN saturation - L foot amputation - cleanse gently w/ NS, allow to dry. Apply Hydrofera blue to wound bed and secure w/ foam dressing.     BID/PRN - groin/abd folds - cleanse gently w/ soap and water or baby wipes, pat dry. Apply Miconazole powder to groin and abd folds. Keep pt clean and dry.     BID/PRN - Buttocks - cleanse gently w/ soap and water, pat dry and apply Z-guard paste. No foam dressings or diapers     ** Apply Lac-Hydrin lotion to dry skin areas at both legs daily.                      Diabetes Care:  SN to perform and educate Diabetic management with blood glucose monitoring:    Fingerstick blood sugar AC and HS   Fingerstick  blood sugar every 6 hours if unable to eat      Report CBG < 60 or > 400 to physician.                                          Insulin Sliding Scale          Glucose  Novolog Insulin Subcutaneous        0 - 60   Orange juice or glucose tablet, hold insulin      No insulin   201-250  2 units   251-300  4 units   301-350  6 units   351-400  8 units   >400   10 units then call physician    Medications: Discontinue all previous medication orders, if any. See new list below.     Medication List        START taking these medications      cefiderocoL 1 gram Solr  Inject 1 g into the vein every 8 (eight) hours. End date 10/25/2023.     fluconazole 200 MG Tab  Commonly known as: DIFLUCAN  Take 1 tablet (200 mg total) by mouth once daily. End date 10/25/2023.     hydrALAZINE 100 MG tablet  Commonly known as: APRESOLINE  Take 1 tablet (100 mg total) by mouth every 8 (eight) hours.     miconazole NITRATE 2 % 2 % top powder  Commonly known as: MICOTIN  Apply topically 2 (two) times daily to groin and abdominal folds.     NIFEdipine 60 MG (OSM) 24 hr tablet  Commonly known as: PROCARDIA-XL  Take 1 tablet (60 mg total) by mouth once daily.  Start taking on: October 19, 2023            CHANGE how you take these medications      gabapentin 100 MG capsule  Commonly known as: NEURONTIN  Take 1 capsule (100 mg total) by mouth 2 (two) times daily.  What changed: how much to take     insulin aspart U-100 100 unit/mL (3 mL) Inpn pen  Commonly known as: NovoLOG  Inject 6 Units into the skin 3 (three) times daily with meals.  What changed: how much to take     LANTUS SOLOSTAR U-100 INSULIN glargine 100 units/mL SubQ pen  Generic drug: insulin  Inject 45 Units into the skin every evening.  What changed: when to take this            CONTINUE taking these medications      acetaminophen 325 MG tablet  Commonly known as: TYLENOL  Take 650 mg by mouth every 6 (six) hours as needed for Temperature greater than.     acidophilus-pectin,  "citrus 100 million cell-10 mg Cap  Take 1 capsule by mouth 2 (two) times a day.     ascorbic acid (vitamin C) 500 MG tablet  Commonly known as: VITAMIN C  Take 500 mg by mouth 2 (two) times daily.     B COMPLEX-VITAMIN B12 tablet  Generic drug: b complex vitamins  Take 1 tablet by mouth once daily.     * BD ULTRA-FINE ADITYA PEN NEEDLE 32 gauge x 5/32" Ndle  Generic drug: pen needle, diabetic  USE FOUR TIMES DAILY WITH MEALS AND NIGHTLY     * BD ULTRA-FINE ADITYA PEN NEEDLE 32 gauge x 5/32" Ndle  Generic drug: pen needle, diabetic  use as directed with insulin     blood sugar diagnostic Strp  Commonly known as: CONTOUR TEST STRIPS  Inject 1 strip into the skin 2 (two) times daily before meals.     clopidogreL 75 mg tablet  Commonly known as: PLAVIX  Take 1 tablet (75 mg total) by mouth once daily.     diphenhydrAMINE 25 mg capsule  Commonly known as: BENADRYL  No directions listed on the patients medication list.     ELIQUIS 5 mg Tab  Generic drug: apixaban  Take 1 tablet (5 mg total) by mouth 2 (two) times daily.     FLOMAX 0.4 mg Cap  Generic drug: tamsulosin  Take 0.4 mg by mouth once daily.     glipiZIDE 10 MG tablet  Commonly known as: GLUCOTROL  Take 20 mg by mouth 2 (two) times a day.     isosorbide dinitrate 10 MG tablet  Commonly known as: ISORDIL  Take 1 tablet (10 mg total) by mouth 3 (three) times daily.     MICROLET LANCET Misc  Generic drug: lancets     multivitamin per tablet  Commonly known as: THERAGRAN  Take 1 tablet by mouth once daily.     oxyCODONE 5 mg Tbor  Take 1 tablet by mouth every 6 (six) hours as needed (Pain (Max 5 daily)).     pantoprazole 40 MG tablet  Commonly known as: PROTONIX  Take 40 mg by mouth once daily. Before breakfast     rosuvastatin 40 MG Tab  Commonly known as: CRESTOR  Take 40 mg by mouth once daily.     triamcinolone acetonide 0.025% 0.025 % Oint  Commonly known as: KENALOG  Apply topically 2 (two) times daily as needed (to affected area).           * This list has 2 " medication(s) that are the same as other medications prescribed for you. Read the directions carefully, and ask your doctor or other care provider to review them with you.                STOP taking these medications      furosemide 40 MG tablet  Commonly known as: LASIX     losartan-hydrochlorothiazide 100-25 mg 100-25 mg per tablet  Commonly known as: HYZAAR     metFORMIN 1000 MG (MOD) 24hr tablet  Commonly known as: GLUMETZA          Discontinue oxycodone.      Immunizations Administered as of 10/19/2023       No immunizations on file.                  _________________________________  Milton Shelton MD  10/19/2023

## 2023-10-18 NOTE — PROGRESS NOTES
[facility] - Intensive Care ([address] Medicine  Progress Note    Patient Name: Saji Castañeda  MRN: [id number]  Patient Class: IP- Inpatient   Admission Date: 10/2/2023  Length of Stay: 16 days  Attending Physician: Yakelin Ying MD  Primary Care Provider: [facility] -        Subjective:     Principal Problem:Bacteremia due to Gram-negative bacteria        HPI:  Saji Castañeda is a 73 year old Black man with hypertension, hyperlipidemia, diabetes mellitus type 2 with neuropathy, left ventricular concentric hypertrophy, paroxysmal atrial fibrillation (anticoagulated on apixaban), Mobitz type I atrioventricular heart block, chronic kidney disease stage 3, chronic venous hypertension, peripheral vascular disease, 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 He lives in [place], [place].               He presented to [facility] on 10/2/2023 from [facility] due to concerns of weakness and feeling like he was going to die. He reported no dizziness or headaches. He knew his name, where he was, and the current year but had trouble remembering which nursing home he came from. Blood glucose was in the 20s. He was given dextrose in the emergency department and glucose improved to 96 mg/dL. Labs showed acute kidney injury with creatinine of 2.6 mg/dL from baseline of 1.3 and lactic acid elevated at 2.29 mmol/L. He had complete heart block. He was admitted to Cardiology.      Overview/Hospital Course:  Saji Castañeda is a 73 year old Black man with hypertension, hyperlipidemia, diabetes mellitus type 2 with neuropathy, left ventricular concentric hypertrophy, chronic lower extremity osteomyelitis,  presented to [facility] due to concerns of weakness  and feeling like he was going to die. He had type 2 second-degree heart block. He was admitted to Cardiology.  However patient's blood pressure dropped significantly and was transferred to ICU for sepsis.  With PICC line verses acute osteomyelitis of the posterior aspect of the calcaneus as a possible source.. He was started on dobutamine, vancomycin, and piperacillin-tazobactam. Blood culture grew Acinetobacter baumannii and Klebsiella pneumoniae.Vascular Surgery suspected poor wound healing to be due to severe edema. They ordered ankle brachial indices but it was unable to be done due to the hardness of his skin. They did not recommend amputation based on their physical exam findings. Foot wound culture grew Achromobacter xylosoxidans dentrificans and Candida albicans.  Infectious Disease changed piperacillin-tazobactam to cefidericol and fluconazole. Lower extremity arterial ultrasound suggested moderate stenosis of the right anterior tibial artery and severe stenosis of the right posterior tibial artery. Vascular Surgery reviewed this and felt it would not hinder his ability to heal.   He had acute on chronic renal failure.  Repeat EKG showed 2nd degree AV block replacing junctional rhythm. Electrophysiology recommended pacemaker placement. Electrophysiology reviewed his EKG on 10/8/2023 and noted 2nd degree heart block Mobitz I. Renal function did not improve. Fractional excretion of sodium was 1.5% suggesting intrinsic cause.  Nephrology was consulted for persistent acute kidney injury and suspected ischemic acute tubular necrosis from hypoperfusion in setting of shock (on dobutamine) and symptomatic bradycardia with additional insult from sepsis (bacteremia) and vancomycin toxicity (trough ~27 on 10/4/2023).  Patient underwent pacemaker placement on 10/12.    Initial plan was to discharge patient to home on IV antibiotics.  As per  and  team it is difficult to to arrange cefidericol  at home, so trying to find a nursing place for patient to get IV antibiotics.      Interval History:  Patient is comfortably resting on bed.  No overnight events.      tReview of Systems   Constitutional:  Negative for chills and fever.   Neurological:  Negative for seizures and syncope.     Objective:     Vital Signs (Most Recent):  Temp: 99 °F (37.2 °C) (10/18/23 1203)  Pulse: 74 (10/18/23 1203)  Resp: 19 (10/18/23 1203)  BP: (!) 141/63 (10/18/23 1203)  SpO2: 96 % (10/18/23 1203) Vital Signs (24h Range):  Temp:  [98 °F (36.7 °C)-99.3 °F (37.4 °C)] 99 °F (37.2 °C)  Pulse:  [73-84] 74  Resp:  [18-20] 19  SpO2:  [92 %-98 %] 96 %  BP: (141-179)/(63-74) 141/63     Weight: 112.5 kg (248 lb)  Body mass index is 36.62 kg/m².    Intake/Output Summary (Last 24 hours) at 10/18/2023 1232  Last data filed at 10/18/2023 1230  Gross per 24 hour   Intake 4486.35 ml   Output 1375 ml   Net 3111.35 ml           Physical Exam  Vitals and nursing note reviewed.   Constitutional:       General: He is not in acute distress.     Appearance: He is well-developed. He is obese. He is not diaphoretic.   Musculoskeletal:      Right lower leg: Edema present.      Left lower leg: Edema present.   Skin:     General: Skin is warm and dry.      Coloration: Skin is not jaundiced or pale.   Neurological:      Mental Status: He is alert and oriented to person, place, and time. Mental status is at baseline.      Motor: No seizure activity.   Psychiatric:         Attention and Perception: Attention normal.         Mood and Affect: Affect normal.         Behavior: Behavior is not aggressive or combative.             Significant Labs: All pertinent labs within the past 24 hours have been reviewed.  Recent Labs   Lab 10/16/23  0449 10/17/23  0538 10/18/23  0209   * 146* 145   K 4.1 3.8 3.4*   * 114* 111*   CO2 22* 21* 22*   BUN 38* 33* 31*   CREATININE 2.9* 2.7* 2.8*   CALCIUM 8.5* 8.4* 8.3*           Significant Imaging: I have reviewed all  pertinent imaging results/findings within the past 24 hours.        Assessment/Plan:      * Bacteremia due to Gram-negative bacteria   sepsis secondary to ESBL Klebsiella and  Acinetobacter bacteremia. likely source includes PICC line - also consider from left heel wound. PICC line removed 10/4/2023. Repeat blood cultures NGTD.      Cefidericol was added to blood culture report from 10/2, awaiting results of sensitivity report. Wound culture + C. Albicans and achromobacter xylosoxidans.         Recommendations:  1. Continue cefidericol 1g IV q8hr (CrCl 18- renally dosed) and fluconazole 200mg qd   2. No further vascular surgical plans at this time. Stating pt has ability to heal wounds. Recommend to continue frequent wound care, off loading, and nutritional support.    3. Plan to continue IV therapy until original end of care date for osteo (10/25/23),    Hyperkalemia  Resolved   K-6.2 on 10/12, (most likely hemolyzed sample )  1 dose of Lokelma was given prior to the procedure. 1 dose of calcium gluconate given and EKG was ordered, hyperkalemia per plan was initiated.   However Repeat potassium was 5.3. orders were discontinued.  We will only continue Lokelma for now        Cardiogenic shock        Complete heart block        Asymptomatic bacteriuria  Each urine culture has pyuria and bacteriuria. Urine culture on 10/2/2023 had no growth. Urine culture on 10/4/2023 grew multiple organisms with none in predominance. Urine culture on 10/9/2023 had no growth.     Blurred vision, left eye  Reports this to be chronic. Follow up with an ophthalmologist.    History of complete heart block  AV block, 2nd degree  Pacemaker placed on 10/12,  no postop complication    Paroxysmal atrial fibrillation  Continue home apixaban.     AV block, 2nd degree  Pacemaker placed on 10/12.    Chronic osteomyelitis        Nonhealing ulcer of right lower leg with fat layer exposed  Edema of leg  Vascular Surgery believes edema contributes  to poor wound healing. They did not recommend amputation. They recommended compression wrappings. Consulted Wound Care for this. Already on medical management for PAD with clopidrogel, apixaban, atorvastatin.     Chronic deep vein thrombosis (DVT) of right upper extremity  Continue home apixaban.    Peripheral arterial disease  Continue home clopidrogel and atorvastatin.  Evaluated by vascular surgery recommended that patient was not a revascularization candidate due to extensive disease, multiple comorbidities, debility/bed bound status and concern for noncompliance.    Acute on chronic renal failure  On admission, has worsened since. Monitor labs. FENa 1.5%. Occasional eosinophils. No obstruction on renal ultrasound. Appreciate Nephrology, who believe it is multifactorial.    YNES (acute kidney injury)  Patient with acute kidney injury/acute renal failure likely due to pre-renal azotemia due to dehydration YNES is currently improving. Baseline creatinine 2 - Labs reviewed- Renal function/electrolytes with Estimated Creatinine Clearance: 28.6 mL/min (A) (based on SCr of 2.8 mg/dL (H)). according to latest data. Monitor urine output and serial BMP and adjust therapy as needed. Avoid nephrotoxins and renally dose meds for GFR listed above.        Insulin dependent type 2 diabetes mellitus  Holding home glipizide. He takes insulin glargine 45 units HS and insulin aspart 12 units TID. Giving insulin detemir 25 units HS, insulin aspart 5 units TID and sliding scale. Monitor Accuchecks.    Edema of leg          VTE Risk Mitigation (From admission, onward)         Ordered     apixaban tablet 5 mg  2 times daily         10/11/23 0932     IP VTE HIGH RISK PATIENT  Once         10/02/23 1513     Place sequential compression device  Until discontinued         10/02/23 1513                Discharge Planning   OXANA: 10/18/2023     Code Status: Full Code   Is the patient medically ready for discharge?: No    Reason for patient  still in hospital (select all that apply): Pending disposition  Discharge Plan A: Home Health (IV infusion)                  Yakelin Ying MD  Department of Hospital Medicine   [facility] - Intensive Care ([address])

## 2023-10-18 NOTE — PROGRESS NOTES
[facility] - Intensive Care ([address] Medicine  Progress Note    Patient Name: Saji Castañeda  MRN: [id number]  Patient Class: IP- Inpatient   Admission Date: 10/2/2023  Length of Stay: 16 days  Attending Physician: Yakelin Ying MD  Primary Care Provider: [facility] -        Subjective:     Principal Problem:Bacteremia due to Gram-negative bacteria        HPI:  Saji Castañeda is a 73 year old Black man with hypertension, hyperlipidemia, diabetes mellitus type 2 with neuropathy, left ventricular concentric hypertrophy, paroxysmal atrial fibrillation (anticoagulated on apixaban), Mobitz type I atrioventricular heart block, chronic kidney disease stage 3, chronic venous hypertension, peripheral vascular disease, 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 He lives in [place], [place].               He presented to [facility] on 10/2/2023 from [facility] due to concerns of weakness and feeling like he was going to die. He reported no dizziness or headaches. He knew his name, where he was, and the current year but had trouble remembering which nursing home he came from. Blood glucose was in the 20s. He was given dextrose in the emergency department and glucose improved to 96 mg/dL. Labs showed acute kidney injury with creatinine of 2.6 mg/dL from baseline of 1.3 and lactic acid elevated at 2.29 mmol/L. He had complete heart block. He was admitted to Cardiology.      Overview/Hospital Course:  Saji Castañeda is a 73 year old Black man with hypertension, hyperlipidemia, diabetes mellitus type 2 with neuropathy, left ventricular concentric hypertrophy, chronic lower extremity osteomyelitis,  presented to [facility] due to concerns of weakness  and feeling like he was going to die. He had type 2 second-degree heart block. He was admitted to Cardiology.  However patient's blood pressure dropped significantly and was transferred to ICU for sepsis.  With PICC line verses acute osteomyelitis of the posterior aspect of the calcaneus as a possible source.. He was started on dobutamine, vancomycin, and piperacillin-tazobactam. Blood culture grew Acinetobacter baumannii and Klebsiella pneumoniae.Vascular Surgery suspected poor wound healing to be due to severe edema. They ordered ankle brachial indices but it was unable to be done due to the hardness of his skin. They did not recommend amputation based on their physical exam findings. Foot wound culture grew Achromobacter xylosoxidans dentrificans and Candida albicans.  Infectious Disease changed piperacillin-tazobactam to cefidericol and fluconazole. Lower extremity arterial ultrasound suggested moderate stenosis of the right anterior tibial artery and severe stenosis of the right posterior tibial artery. Vascular Surgery reviewed this and felt it would not hinder his ability to heal.   He had acute on chronic renal failure.  Repeat EKG showed 2nd degree AV block replacing junctional rhythm. Electrophysiology recommended pacemaker placement. Electrophysiology reviewed his EKG on 10/8/2023 and noted 2nd degree heart block Mobitz I. Renal function did not improve. Fractional excretion of sodium was 1.5% suggesting intrinsic cause.  Nephrology was consulted for persistent acute kidney injury and suspected ischemic acute tubular necrosis from hypoperfusion in setting of shock (on dobutamine) and symptomatic bradycardia with additional insult from sepsis (bacteremia) and vancomycin toxicity (trough ~27 on 10/4/2023).  Patient underwent pacemaker placement on 10/12.    Initial plan was to discharge patient to home on IV antibiotics.  As per  and  team it is difficult to to arrange cefidericol  at home, so trying to find a nursing place for patient to get IV antibiotics.      No new subjective & objective note has been filed under this hospital service since the last note was generated.      Assessment/Plan:      * Bacteremia due to Gram-negative bacteria   sepsis secondary to ESBL Klebsiella and  Acinetobacter bacteremia. likely source includes PICC line - also consider from left heel wound. PICC line removed 10/4/2023. Repeat blood cultures NGTD.      Cefidericol was added to blood culture report from 10/2, awaiting results of sensitivity report. Wound culture + C. Albicans and achromobacter xylosoxidans.         Recommendations:  1. Continue cefidericol 1g IV q8hr (CrCl 18- renally dosed) and fluconazole 200mg qd   2. No further vascular surgical plans at this time. Stating pt has ability to heal wounds. Recommend to continue frequent wound care, off loading, and nutritional support.    3. Plan to continue IV therapy until original end of care date for osteo (10/25/23),    Hyperkalemia  Resolved   K-6.2 on 10/12, (most likely hemolyzed sample )  1 dose of Lokelma was given prior to the procedure. 1 dose of calcium gluconate given and EKG was ordered, hyperkalemia per plan was initiated.   However Repeat potassium was 5.3. orders were discontinued.  We will only continue Lokelma for now        Cardiogenic shock        Complete heart block        Asymptomatic bacteriuria  Each urine culture has pyuria and bacteriuria. Urine culture on 10/2/2023 had no growth. Urine culture on 10/4/2023 grew multiple organisms with none in predominance. Urine culture on 10/9/2023 had no growth.     Blurred vision, left eye  Reports this to be chronic. Follow up with an ophthalmologist.    History of complete heart block  AV block, 2nd degree  Pacemaker placed on 10/12,  no postop complication    Paroxysmal atrial fibrillation  Continue home apixaban.     AV block, 2nd degree  Pacemaker placed on 10/12.    Chronic  osteomyelitis        Nonhealing ulcer of right lower leg with fat layer exposed  Edema of leg  Vascular Surgery believes edema contributes to poor wound healing. They did not recommend amputation. They recommended compression wrappings. Consulted Wound Care for this. Already on medical management for PAD with clopidrogel, apixaban, atorvastatin.     Chronic deep vein thrombosis (DVT) of right upper extremity  Continue home apixaban.    Peripheral arterial disease  Continue home clopidrogel and atorvastatin.  Evaluated by vascular surgery recommended that patient was not a revascularization candidate due to extensive disease, multiple comorbidities, debility/bed bound status and concern for noncompliance.    Acute on chronic renal failure  On admission, has worsened since. Monitor labs. FENa 1.5%. Occasional eosinophils. No obstruction on renal ultrasound. Appreciate Nephrology, who believe it is multifactorial.    YNES (acute kidney injury)  Patient with acute kidney injury/acute renal failure likely due to pre-renal azotemia due to dehydration YNES is currently improving. Baseline creatinine 2 - Labs reviewed- Renal function/electrolytes with Estimated Creatinine Clearance: 28.6 mL/min (A) (based on SCr of 2.8 mg/dL (H)). according to latest data. Monitor urine output and serial BMP and adjust therapy as needed. Avoid nephrotoxins and renally dose meds for GFR listed above.        Insulin dependent type 2 diabetes mellitus  Holding home glipizide. He takes insulin glargine 45 units HS and insulin aspart 12 units TID. Giving insulin detemir 25 units HS, insulin aspart 5 units TID and sliding scale. Monitor Accuchecks.    Edema of leg          VTE Risk Mitigation (From admission, onward)         Ordered     apixaban tablet 5 mg  2 times daily         10/11/23 0932     IP VTE HIGH RISK PATIENT  Once         10/02/23 1513     Place sequential compression device  Until discontinued         10/02/23 7323                 Discharge Planning   OXANA: 10/18/2023     Code Status: Full Code   Is the patient medically ready for discharge?: No    Reason for patient still in hospital (select all that apply): Pending disposition  Discharge Plan A: Home Health (IV infusion)                  Yakelin Ying MD  Department of Hospital Medicine   [facility] - Intensive Care ([address])

## 2023-10-18 NOTE — PLAN OF CARE
Problem: Adult Inpatient Plan of Care  Goal: Plan of Care Review  Outcome: Ongoing, Progressing     Problem: Diabetes Comorbidity  Goal: Blood Glucose Level Within Targeted Range  Outcome: Ongoing, Progressing     Problem: Adjustment to Illness (Sepsis/Septic Shock)  Goal: Optimal Coping  Outcome: Ongoing, Progressing     Problem: Infection  Goal: Absence of Infection Signs and Symptoms  Outcome: Ongoing, Progressing     Problem: Impaired Wound Healing  Goal: Optimal Wound Healing  Outcome: Ongoing, Progressing

## 2023-10-19 VITALS
SYSTOLIC BLOOD PRESSURE: 140 MMHG | WEIGHT: 248 LBS | TEMPERATURE: 98 F | DIASTOLIC BLOOD PRESSURE: 63 MMHG | OXYGEN SATURATION: 94 % | HEIGHT: 69 IN | RESPIRATION RATE: 18 BRPM | HEART RATE: 86 BPM | BODY MASS INDEX: 36.73 KG/M2

## 2023-10-19 PROBLEM — R57.0 CARDIOGENIC SHOCK: Status: RESOLVED | Noted: 2023-10-11 | Resolved: 2023-10-19

## 2023-10-19 PROBLEM — E87.5 HYPERKALEMIA: Status: RESOLVED | Noted: 2023-10-12 | Resolved: 2023-10-19

## 2023-10-19 LAB
ALBUMIN SERPL BCP-MCNC: 2.2 G/DL (ref 3.5–5.2)
ANION GAP SERPL CALC-SCNC: 11 MMOL/L (ref 8–16)
BUN SERPL-MCNC: 31 MG/DL (ref 8–23)
CALCIUM SERPL-MCNC: 8.2 MG/DL (ref 8.7–10.5)
CHLORIDE SERPL-SCNC: 110 MMOL/L (ref 95–110)
CO2 SERPL-SCNC: 22 MMOL/L (ref 23–29)
CREAT SERPL-MCNC: 2.4 MG/DL (ref 0.5–1.4)
ERYTHROCYTE [DISTWIDTH] IN BLOOD BY AUTOMATED COUNT: 19.5 % (ref 11.5–14.5)
EST. GFR  (NO RACE VARIABLE): 27.6 ML/MIN/1.73 M^2
GLUCOSE SERPL-MCNC: 114 MG/DL (ref 70–110)
HCT VFR BLD AUTO: 23.6 % (ref 40–54)
HGB BLD-MCNC: 7.2 G/DL (ref 14–18)
MCH RBC QN AUTO: 27.6 PG (ref 27–31)
MCHC RBC AUTO-ENTMCNC: 30.5 G/DL (ref 32–36)
MCV RBC AUTO: 90 FL (ref 82–98)
PHOSPHATE SERPL-MCNC: 2.6 MG/DL (ref 2.7–4.5)
PLATELET # BLD AUTO: 280 K/UL (ref 150–450)
PMV BLD AUTO: 10.7 FL (ref 9.2–12.9)
POCT GLUCOSE: 143 MG/DL (ref 70–110)
POCT GLUCOSE: 180 MG/DL (ref 70–110)
POTASSIUM SERPL-SCNC: 3.5 MMOL/L (ref 3.5–5.1)
RBC # BLD AUTO: 2.61 M/UL (ref 4.6–6.2)
SODIUM SERPL-SCNC: 143 MMOL/L (ref 136–145)
WBC # BLD AUTO: 6.64 K/UL (ref 3.9–12.7)

## 2023-10-19 PROCEDURE — 25000003 PHARM REV CODE 250: Performed by: STUDENT IN AN ORGANIZED HEALTH CARE EDUCATION/TRAINING PROGRAM

## 2023-10-19 PROCEDURE — A4216 STERILE WATER/SALINE, 10 ML: HCPCS | Performed by: STUDENT IN AN ORGANIZED HEALTH CARE EDUCATION/TRAINING PROGRAM

## 2023-10-19 PROCEDURE — 80069 RENAL FUNCTION PANEL: CPT | Performed by: STUDENT IN AN ORGANIZED HEALTH CARE EDUCATION/TRAINING PROGRAM

## 2023-10-19 PROCEDURE — 99233 SBSQ HOSP IP/OBS HIGH 50: CPT | Mod: ,,, | Performed by: HOSPITALIST

## 2023-10-19 PROCEDURE — 25000003 PHARM REV CODE 250: Performed by: PHYSICIAN ASSISTANT

## 2023-10-19 PROCEDURE — 36415 COLL VENOUS BLD VENIPUNCTURE: CPT | Performed by: STUDENT IN AN ORGANIZED HEALTH CARE EDUCATION/TRAINING PROGRAM

## 2023-10-19 PROCEDURE — 63600175 PHARM REV CODE 636 W HCPCS: Mod: JZ,TB | Performed by: PHYSICIAN ASSISTANT

## 2023-10-19 PROCEDURE — 25000003 PHARM REV CODE 250

## 2023-10-19 PROCEDURE — 85027 COMPLETE CBC AUTOMATED: CPT | Performed by: STUDENT IN AN ORGANIZED HEALTH CARE EDUCATION/TRAINING PROGRAM

## 2023-10-19 PROCEDURE — 99233 PR SUBSEQUENT HOSPITAL CARE,LEVL III: ICD-10-PCS | Mod: ,,, | Performed by: HOSPITALIST

## 2023-10-19 PROCEDURE — 25000003 PHARM REV CODE 250: Performed by: HOSPITALIST

## 2023-10-19 RX ADMIN — ACETAMINOPHEN 650 MG: 325 TABLET ORAL at 05:10

## 2023-10-19 RX ADMIN — INSULIN ASPART 5 UNITS: 100 INJECTION, SOLUTION INTRAVENOUS; SUBCUTANEOUS at 08:10

## 2023-10-19 RX ADMIN — HYDRALAZINE HYDROCHLORIDE 100 MG: 50 TABLET ORAL at 03:10

## 2023-10-19 RX ADMIN — CEFIDEROCOL SULFATE TOSYLATE 1 G: 1 INJECTION, POWDER, FOR SOLUTION INTRAVENOUS at 03:10

## 2023-10-19 RX ADMIN — HYDRALAZINE HYDROCHLORIDE 100 MG: 50 TABLET ORAL at 05:10

## 2023-10-19 RX ADMIN — OXYCODONE HYDROCHLORIDE AND ACETAMINOPHEN 500 MG: 500 TABLET ORAL at 08:10

## 2023-10-19 RX ADMIN — TAMSULOSIN HYDROCHLORIDE 0.4 MG: 0.4 CAPSULE ORAL at 08:10

## 2023-10-19 RX ADMIN — ACETAMINOPHEN 650 MG: 325 TABLET ORAL at 12:10

## 2023-10-19 RX ADMIN — CEFIDEROCOL SULFATE TOSYLATE 1 G: 1 INJECTION, POWDER, FOR SOLUTION INTRAVENOUS at 05:10

## 2023-10-19 RX ADMIN — GABAPENTIN 100 MG: 300 CAPSULE ORAL at 08:10

## 2023-10-19 RX ADMIN — INSULIN ASPART 5 UNITS: 100 INJECTION, SOLUTION INTRAVENOUS; SUBCUTANEOUS at 12:10

## 2023-10-19 RX ADMIN — CLOPIDOGREL BISULFATE 75 MG: 75 TABLET ORAL at 08:10

## 2023-10-19 RX ADMIN — Medication 10 ML: at 06:10

## 2023-10-19 RX ADMIN — PANTOPRAZOLE SODIUM 40 MG: 40 TABLET, DELAYED RELEASE ORAL at 08:10

## 2023-10-19 RX ADMIN — Medication 10 ML: at 12:10

## 2023-10-19 RX ADMIN — APIXABAN 5 MG: 5 TABLET, FILM COATED ORAL at 08:10

## 2023-10-19 RX ADMIN — NIFEDIPINE 60 MG: 30 TABLET, FILM COATED, EXTENDED RELEASE ORAL at 08:10

## 2023-10-19 RX ADMIN — MICONAZOLE NITRATE: 20 POWDER TOPICAL at 08:10

## 2023-10-19 RX ADMIN — FLUCONAZOLE 200 MG: 200 TABLET ORAL at 08:10

## 2023-10-19 RX ADMIN — THERA TABS 1 TABLET: TAB at 08:10

## 2023-10-19 NOTE — PLAN OF CARE
SW placed PFC orders for patient transport to [facility]. Patient notified and agreeable to dc plan. SW phoned patient's sister to notify, no answer. SW left  regarding transport orders. Report to be called to [phone]. Transport ETA 3:30pm.    QUINCY will continue to follow.                   AMAYA Toledo, LMSW  [facility]  Case Management  Ext. [phone]

## 2023-10-19 NOTE — ASSESSMENT & PLAN NOTE
On admission, has worsened since. Monitor labs. FENa 1.5%. Occasional eosinophils. No obstruction on renal ultrasound. Appreciate Nephrology, who believe it is multifactorial. Improving.

## 2023-10-19 NOTE — ASSESSMENT & PLAN NOTE
ESBL Klebsiella and  Acinetobacter bacteremia. Recommendations:  1. Continue cefidericol 1g IV q8hr (CrCl 18- renally dosed) and fluconazole 200mg qd   2. No further vascular surgical plans at this time. Recommend to continue frequent wound care, off loading, and nutritional support.    3. Plan to continue IV therapy until original end of care date for osteo (10/25/23).

## 2023-10-19 NOTE — PLAN OF CARE
ID Follow Up Note:  [facility] outside susceptibilities of Acinetobacter  to cefiderocol reviewed with ID PharmD.  Susceptible per [employer]SI    Contacted by Primary Team for other antibiotic options.  Prior ID notes and micro reviewed.   There are no other viable antibiotic options for acinetobacter .  Recommend continuing with original ID OPAT recommendations of 10/13/23 as noted below:    Outpatient Antibiotic Therapy Plan:        1) Infection: klebsiella pneumoniae ESBL/acinetobacter  bacteremia, cleared 10/6 & c,. Albicans and achromobacter from left foot wound (underlying osteo).      2) Discharge Antibiotics:     Intravenous antibiotics:  cefiderocol 1g IV q8hr     Oral antibiotics:  Fluconazole 200 mg PO qd       3) Therapy Duration:  2 weeks from cleared culture (10/20/23) for bacteremia and until 10/25/23 for previous left leg osteomyelitis      Estimated end date of IV antibiotics: 10/25/2023     4) Outpatient Weekly Labs:     Order the following labs to be drawn on Mondays:   CBC  CMP   CRP        5) Fax Lab Results to Infectious Diseases Provider: ANIYAH Desir FNP-C     [facility] Fax Number: [phone]     6) Outpatient Infectious Diseases Follow-up     ID Follow-up appointment is set for 10/24/23

## 2023-10-19 NOTE — ASSESSMENT & PLAN NOTE
Patient with acute kidney injury/acute renal failure likely due to pre-renal azotemia due to dehydration YNES is currently improving. Baseline creatinine 2 - Labs reviewed- Renal function/electrolytes with Estimated Creatinine Clearance: 33.4 mL/min (A) (based on SCr of 2.4 mg/dL (H)). according to latest data. Monitor urine output and serial BMP and adjust therapy as needed. Avoid nephrotoxins and renally dose meds for GFR listed above.

## 2023-10-19 NOTE — NURSING
At 2000, pt refused to turn.   At 2200, pt refused to turn and said he would let nurse know when he wants to be turned. Pt. Able to turn himself.

## 2023-10-19 NOTE — DISCHARGE SUMMARY
[facility] - Intensive Care ([address] Medicine  Discharge Summary      Patient Name: Saji Castañeda  MRN: [id number]  [facility]: [id number]  Patient Class: IP- Inpatient  Admission Date: 10/2/2023  Hospital Length of Stay: 17 days  Discharge Date and Time: 10/19/2023  5:03 PM  Attending Physician: Milton Shelton MD   Discharging Provider: Milton Shelton MD  Primary Care Provider: Ken Jennings [facility] -  [facility] Medicine Team: [facility] HOSP MED D Milton Shelton MD  Primary Care Team: [facility] HOSP MED D    HPI:   Saji Castañeda is a 73 year old Black man with hypertension, hyperlipidemia, diabetes mellitus type 2 with neuropathy, left ventricular concentric hypertrophy, paroxysmal atrial fibrillation (anticoagulated on apixaban), Mobitz type I atrioventricular heart block, chronic kidney disease stage 3, chronic venous hypertension, peripheral vascular disease, 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 He lives in [place], [place].               He presented to [facility] on 10/2/2023 from [facility] due to concerns of weakness and feeling like he was going to die. He reported no dizziness or headaches. He knew his name, where he was, and the current year but had trouble remembering which nursing home he came from. Blood glucose was in the 20s. He was given dextrose in the emergency department and glucose improved to 96 mg/dL. Labs showed acute kidney injury with creatinine of 2.6 mg/dL from baseline of 1.3 and lactic acid elevated at 2.29 mmol/L. He had complete heart block. He was admitted to Cardiology.      Procedure(s) (LRB):  [id number] (N/A)      Hospital Course:   Saji Castañeda is a 73 year old Black man with hypertension, hyperlipidemia,  diabetes mellitus type 2 with neuropathy, left ventricular concentric hypertrophy, chronic lower extremity osteomyelitis,  presented to [facility] due to concerns of weakness and feeling like he was going to die. He had type 2 second-degree heart block. He was admitted to Cardiology.  However patient's blood pressure dropped significantly and was transferred to ICU for sepsis.  With PICC line verses acute osteomyelitis of the posterior aspect of the calcaneus as a possible source.. He was started on dobutamine, vancomycin, and piperacillin-tazobactam. Blood culture grew Acinetobacter baumannii and Klebsiella pneumoniae.Vascular Surgery suspected poor wound healing to be due to severe edema. They ordered ankle brachial indices but it was unable to be done due to the hardness of his skin. They did not recommend amputation based on their physical exam findings. Foot wound culture grew Achromobacter xylosoxidans dentrificans and Candida albicans.  Infectious Disease changed piperacillin-tazobactam to cefidericol and fluconazole. Lower extremity arterial ultrasound suggested moderate stenosis of the right anterior tibial artery and severe stenosis of the right posterior tibial artery. Vascular Surgery reviewed this and felt it would not hinder his ability to heal.   He had acute on chronic renal failure.  Repeat EKG showed 2nd degree AV block replacing junctional rhythm. Electrophysiology recommended pacemaker placement. Electrophysiology reviewed his EKG on 10/8/2023 and noted 2nd degree heart block Mobitz I. Renal function did not improve. Fractional excretion of sodium was 1.5% suggesting intrinsic cause.  Nephrology was consulted for persistent acute kidney injury and suspected ischemic acute tubular necrosis from hypoperfusion in setting of shock (on dobutamine) and symptomatic bradycardia with additional insult from sepsis (bacteremia) and vancomycin toxicity (trough ~27 on 10/4/2023). He underwent  pacemaker placement on 10/12/2023. The initial plan was to discharge him to home on IV antibiotics. Case Management found it too difficult to to arrange cefidericol at home so worked on skilled nursing facility placement. He was accepted to a facility on 10/19/2023.        Goals of Care Treatment Preferences:  Code Status: Full Code    Health care agent: January Nicholson [phone]  Health care agent number: [phone]           Consults:   Consults (From admission, onward)          Status Ordering Provider     [facility]  Once        Provider:  (Not yet assigned)    Completed DANIELLE CASTILLO     Inpatient consult to Nephrology  Once        Provider:  (Not yet assigned)    Completed DAIN WESTON A     Inpatient [facility] to PICC team [facility])  Once        Provider:  (Not yet assigned)    Completed LENNY WESTONIN A     Inpatient consult to Midline team  Once        Provider:  (Not yet assigned)    Completed TIFFANIE HARDWICK HGisselle     Inpatient consult to Midline team  Once        Provider:  (Not yet assigned)    Completed TIFFANIE HARDWICK     Inpatient consult to Electrophysiology  Once        Provider:  (Not yet assigned)    Completed HERNANDEZ CHEATHAM     Inpatient consult to Podiatry  Once        Provider:  (Not yet assigned)    Completed AUSTIN MARRERO     Inpatient consult to Vascular Surgery  Once        Provider:  (Not yet assigned)    Completed AUSTIN MARRERO     IP consult to case management  Once        Provider:  (Not yet assigned)    Completed TIFFANIE HARDWICK     Inpatient consult to Infectious Diseases  Once        Provider:  (Not yet assigned)    Completed JOHANNA DIAZ          Final Active Diagnoses:    Diagnosis Date Noted POA    PRINCIPAL PROBLEM:  Bacteremia due to Gram-negative bacteria [R78.81] 03/23/2021 Yes    Complete heart block [I44.2] 10/11/2023 Yes    Asymptomatic bacteriuria [R82.71] 10/10/2023 Yes    Blurred vision, left eye [H53.8]  10/09/2023 Yes     Chronic    Paroxysmal atrial fibrillation [I48.0] 10/02/2023 No     Chronic    History of complete heart block [Z86.79] 10/02/2023 Not Applicable     Chronic    AV block, 2nd degree [I44.1] 05/24/2023 Yes    Chronic osteomyelitis [M86.60] 05/15/2023 Yes    Nonhealing ulcer of right lower leg with fat layer exposed [L97.912] 10/09/2022 Yes    Chronic deep vein thrombosis (DVT) of right upper extremity [I82.721] 07/06/2022 Yes     Chronic    Peripheral arterial disease [I73.9] 12/10/2020 Yes     Chronic    Acute on chronic renal failure [N17.9, N18.9] 10/15/2018 Yes    Insulin dependent type 2 diabetes mellitus [E11.9, Z79.4] 09/23/2014 Not Applicable     Chronic    Edema of leg [R60.0] 07/31/2012 Yes     Chronic      Problems Resolved During this Admission:    Diagnosis Date Noted Date Resolved POA    Hyperkalemia [E87.5] 10/12/2023 10/19/2023 No    Cardiogenic shock [R57.0] 10/11/2023 10/19/2023 Yes    Sepsis [A41.9] 10/02/2023 10/05/2023 Yes    Palliative care encounter [Z51.5] 05/24/2023 10/04/2023 Not Applicable    Goals of care, counseling/discussion [Z71.89]  10/19/2023 Not Applicable       Discharged Condition: good    Disposition: Skilled Nursing Facility    Follow Up:   Follow-up Information       [facility] - Follow up in 1 week(s).    Specialty: Home Health Services  Contact information:  [address] [place], [place] [address]  Michaela HUSSEIN [address]  [phone]                           Patient Instructions:      BASIC METABOLIC PANEL   Standing Status: Future Standing Exp. Date: 12/14/24     Ambulatory referral/consult to Infectious Disease   Standing Status: Future   Referral Priority: Routine Referral Type: Consultation   Referral Reason: Specialty Services Required   Requested Specialty: Infectious Diseases   Number of Visits Requested: 1     Ambulatory referral/consult to Cardiology   Standing Status: Future   Referral Priority: Routine Referral Type: Consultation   Referral Reason: Specialty  Services Required   Requested Specialty: Cardiology   Number of Visits Requested: 1     Ambulatory referral/consult to Nephrology   Standing Status: Future   Referral Priority: Routine Referral Type: Consultation   Referral Reason: Specialty Services Required   Requested Specialty: Nephrology   Number of Visits Requested: 1     Ambulatory referral/consult to Outpatient Infusion and Home Infusion   Standing Status: Future   Referral Priority: Routine Referral Type: Psychiatric   Referral Reason: Specialty Services Required   Requested Specialty: Behavioral Health   Number of Visits Requested: 1       Significant Diagnostic Studies:   X-Ray Chest 1 View 10/02/23: FINDINGS:   There is a left-sided PICC line in place.  The distal component of the catheter is difficult to identified.  There appears to have been retraction into the left brachiocephalic vein.  Monitoring EKG leads are present.   The trachea is unremarkable.  There are calcifications of the aortic knob.  The cardiomediastinal silhouette is within normal limits.  There is no evidence of free air beneath the hemidiaphragms.  There are no pleural effusions.  There is no evidence of a pneumothorax.  There is no evidence of pneumomediastinum.  There are bilateral interstitial opacities.  There is no focal consolidation.  There are degenerative changes in the osseous structures.   Impression:  1.  Interval retraction of the left-sided PICC line with the tip in the left brachiocephalic vein.   2.  Cardiomegaly with mild bilateral interstitial opacities.  No focal consolidation.   X-Ray Chest AP Portable 10/02/23: FINDINGS:   Right central venous catheter tip projects at the level of the jugular/SVC junction.  Left PICC catheter tip projects in the region of the brachiocephalic.  There is no pneumothorax or significant interval detrimental change otherwise in the cardiopulmonary status since the previous exam.   CT Head Without Contrast 10/03/23: FINDINGS:   There  is posterior right scalp soft tissue induration.  There is no acute intracranial hemorrhage, hydrocephalus, midline shift or mass effect. There is generalized cerebral volume loss compensatory prominence of cerebral sulci and the ventricular system.  Gray-white matter differentiation appears maintained with microangiopathic change.  If there is clinical concern for acute ischemia, further assessment with MRI is advised if there are no clinical contraindications.  Basal cisterns are patent.  There is mucosal thickening and fluid/aerated secretions in the left maxillary sinus.  Correlation for acute sinusitis advised.  Remaining paranasal sinuses and mastoid air cells are clear.  The visualized bones of the calvarium demonstrate no acute osseous abnormality.   Impression:  1. No CT evidence of acute intracranial abnormality.  If there is clinical concern for acute ischemia, further evaluation with MRI is advised if there are no clinical contraindications.   2. Generalized cerebral volume loss and microangiopathic change.   3. Findings concerning for acute left maxillary sinusitis.  Clinical correlation advised.   MRI Brain Without Contrast 10/03/23: FINDINGS:   Study distorted by patient motion.  Within limits of the study there is no restricted diffusion to suggest acute or recent infarction.  Generalized cerebral volume loss with compensatory enlargement ventricles sulci and cisterns similar to prior without evidence for hydrocephalus   Major intracranial skull base T2 flow voids are present.  There is slight hypoplasia of the posterior circulation.  Punctate probable encephalomalacia left inferior cerebellum suggestive for remote infarction.  Few punctate foci of T2 FLAIR signal hyperintensities within the supratentorial white matter which are nonspecific and may represent mild chronic ischemic change   Mild moderate probable mucosal thickening left maxillary antra.  There is no abnormal parenchymal susceptibility  to suggest parenchymal hemorrhage.   Incidental heterogeneous signal within the right parietooccipital scalp soft tissues which may be sequela of prior traumatic injury.   Impression:  Cerebral volume loss with few punctate foci of T2 FLAIR signal abnormality supratentorial white matter which are nonspecific and may represent mild chronic ischemic change.   Punctate probable remote left cerebellar lacunar type infarct   No evidence for acute infarction otherwise unremarkable motion distorted noncontrast MRI brain specifically without evidence for acute infarction.   Transthoracic echo complete with contrast 10/03/23:     Left Ventricle: The left ventricle is normal in size. Ventricular mass is normal. Increased wall thickness. There is concentric remodeling. Normal wall motion. There is normal systolic function with a visually estimated ejection fraction of 60 - 65%. Ejection fraction by visual approximation is 65%. There is normal diastolic function.    Right Ventricle: Normal right ventricular cavity size. Wall thickness is normal. Right ventricle wall motion  is normal. Systolic function is normal.    Aortic Valve: There is aortic valve sclerosis.    Mitral Valve: There is mild mitral annular calcification present.    IVC/SVC: Normal venous pressure at 3 mmHg.  X-Ray Tibia Fibula Bilateral 10/03/23: FINDINGS:   There is osteopenia.  There are vascular calcifications.   Right tibia/fibula: No acute fracture or dislocation.  Alignment is normal.  There is chronic periosteal reaction about the fibular shaft with ossification of the interosseous membrane.  Suspected remote fracture of mid fibular diaphysis.  There is soft tissue edema.  There are vascular calcifications.   Left tibia/fibula: No acute fracture or dislocation.  Alignment is normal.  There is chronic periosteal reaction about the fibular shaft and distal metaphysis with ossification of the interosseous membrane.  There is soft tissue edema.   X-Ray Foot  Complete Right 10/03/23: FINDINGS:   There is osteopenia.  There is no evidence of acute osteomyelitis.  There is a chronic deformity of the 5th metatarsal, stable.  Scattered joint space narrowing.  There are vascular calcifications.  There is soft tissue edema.   X-Ray Ankle Complete Left 10/03/23: FINDINGS:   There are postoperative changes of left foot amputation.  There is a large plantar soft tissue ulcer of the heel with underlying erosive changes and sclerotic changes of the posterior aspect of the calcaneus, concerning for osteomyelitis.  There is also an ulcer of the amputation stump.  There are vascular calcifications.  There are foci of soft tissue gas.   Impression:  1. Acute osteomyelitis of the posterior aspect of the calcaneus.   2. Multiple soft tissue ulcers with soft tissue gas throughout the ankle and distal tib/fib.   US Retroperitoneal Complete 10/04/23: FINDINGS:   Right kidney: The right kidney measures 12.1 cm. No cortical thinning. No loss of corticomedullary distinction. Resistive index measures 0.90, elevated (previously 0.73), mild decreased perfusion..  No mass. No renal stone. No hydronephrosis.   Left kidney: The left kidney measures 12.8 cm. No cortical thinning. No loss of corticomedullary distinction. Resistive index measures 0.94, elevated (previously 0.76).  Mild decreased perfusion.  There is a simple cyst at the upper pole measuring 1.9 cm.  No mass. No renal stone. No hydronephrosis.   Spleen resistive index 0.76.   The bladder is partially distended at the time of scanning and has an unremarkable appearance.   Impression:  There is increased bilateral kidney resistive indices worse from the prior study suggestive of acute on chronic medical kidney disease.   Mild decreased perfusion of the bilateral kidneys.   Benign left renal cyst.   VAS Ankle Brachial Indices Resting 10/04/23: Pressure Lower   ============   Rt brachial A syst BP  149 mmHg   Impression   =========    Technically difficult study due to thickness and hardness of skin. Doppler signal could not penetrate to get pressures. The PVR   waveforms suggests mild to minimal PAOD. More advanced imaging is suggested.   US Lower Extremity Arteries Bilateral 10/07/23: FINDINGS:   The peak systolic velocities on the right are as follows, in centimeters/second:   Common femoral artery: 140 with triphasic waveform   Deep femoral artery: 64 with triphasic waveform   Superficial femoral artery, proximal: 124 triphasic waveform   Superficial femoral artery, mid portion: 130 with phasic waveform   Superficial femoral artery, distal: 119 with triphasic waveform   Proximal popliteal artery: 72 which phasic waveform   Distal popliteal artery: 130 with triphasic from   Anterior tibial artery: 163 with biphasic waveform   Posterior tibial artery: 33 with monophasic waveform diminutive in caliber and difficult to visualize noting severe multifocal atherosclerotic disease on prior CTA runoff.   The peak systolic velocities on the left are as follows, in centimeters/second:   Common femoral artery: 132 with triphasic waveform   Deep femoral artery: 236 with triphasic waveform   Superficial femoral artery, proximal: 115 with triphasic waveform   Superficial femoral artery, mid portion: 113 with triphasic waveform   Superficial femoral artery, distal: 106 with triphasic waveform   Proximal popliteal artery: 115 with triphasic waveform   Distal popliteal artery: 85 with triphasic waveform   Anterior tibial artery: 80 with triphasic waveform   Posterior tibial artery: 88 with monophasic waveform   Prominent to mildly enlarged bilateral inguinal nodes.   Impression:  Velocity doubling in the right anterior tibial artery which estimates a moderate degree of stenosis in this vessel.   Diminutive right posterior tibial artery which is difficult to visualize likely reflecting areas of severe narrowing noting severe multifocal atherosclerotic disease  visualized on prior CTA runoff.   X-Ray Chest AP Portable 10/12/23: FINDINGS:   There has been interval placement of a leadless pacemaker overlying the left aspect of the cardiac silhouette.  There is a right-sided IJ vascular sheath.  The left-sided PICC line has been removed.  Monitoring EKG leads are present.   The trachea is unremarkable.  There is stable enlargement cardiomediastinal silhouette.  There is no evidence of free air beneath the hemidiaphragms.  There are no pleural effusions.  There is no evidence of a pneumothorax.  There is no evidence of pneumomediastinum.  The aeration of the lung fields are unchanged.  There are advanced degenerative changes in the osseous structures.   Impression:  New leadless pacemaker in place.  No apparent immediate complication.   Stable position of right-sided vascular sheath.   Interval removal of the left-sided PICC line.   Otherwise, stable examination.     Medications:  Reconciled Home Medications:      Medication List        START taking these medications      cefiderocoL 1 gram Solr  Inject 1 g into the vein every 8 (eight) hours. for 7 days     fluconazole 200 MG Tab  Commonly known as: DIFLUCAN  Take 1 tablet (200 mg total) by mouth once daily. for 7 days     hydrALAZINE 100 MG tablet  Commonly known as: APRESOLINE  Take 1 tablet (100 mg total) by mouth every 8 (eight) hours.     miconazole NITRATE 2 % 2 % top powder  Commonly known as: MICOTIN  Apply topically 2 (two) times daily.     NIFEdipine 60 MG (OSM) 24 hr tablet  Commonly known as: PROCARDIA-XL  Take 1 tablet (60 mg total) by mouth once daily.            CHANGE how you take these medications      gabapentin 100 MG capsule  Commonly known as: NEURONTIN  Take 1 capsule (100 mg total) by mouth 2 (two) times daily.  What changed: how much to take     insulin aspart U-100 100 unit/mL (3 mL) Inpn pen  Commonly known as: NovoLOG  Inject 6 Units into the skin 3 (three) times daily with meals.  What changed: how  "much to take     LANTUS SOLOSTAR U-100 INSULIN glargine 100 units/mL SubQ pen  Generic drug: insulin  Inject 45 Units into the skin every evening.  What changed: when to take this            CONTINUE taking these medications      acetaminophen 325 MG tablet  Commonly known as: TYLENOL  Take 650 mg by mouth every 6 (six) hours as needed for Temperature greater than.     acidophilus-pectin, citrus 100 million cell-10 mg Cap  Take 1 capsule by mouth 2 (two) times a day.     ascorbic acid (vitamin C) 500 MG tablet  Commonly known as: VITAMIN C  Take 500 mg by mouth 2 (two) times daily.     B COMPLEX-VITAMIN B12 tablet  Generic drug: b complex vitamins  Take 1 tablet by mouth once daily.     * BD ULTRA-FINE ADITYA PEN NEEDLE 32 gauge x 5/32" Ndle  Generic drug: pen needle, diabetic  USE FOUR TIMES DAILY WITH MEALS AND NIGHTLY     * BD ULTRA-FINE ADITYA PEN NEEDLE 32 gauge x 5/32" Ndle  Generic drug: pen needle, diabetic  use as directed with insulin     blood sugar diagnostic Strp  Commonly known as: CONTOUR TEST STRIPS  Inject 1 strip into the skin 2 (two) times daily before meals.     clopidogreL 75 mg tablet  Commonly known as: PLAVIX  Take 1 tablet (75 mg total) by mouth once daily.     DAKIN'S SOLUTION external solution  Generic drug: sodium hypochlorite 0.5 %  Apply topically once daily.     diphenhydrAMINE 25 mg capsule  Commonly known as: BENADRYL  No directions listed on the patients medication list.     ELIQUIS 5 mg Tab  Generic drug: apixaban  Take 1 tablet (5 mg total) by mouth 2 (two) times daily.     FLOMAX 0.4 mg Cap  Generic drug: tamsulosin  Take 0.4 mg by mouth once daily.     glipiZIDE 10 MG tablet  Commonly known as: GLUCOTROL  Take 20 mg by mouth 2 (two) times a day.     isosorbide dinitrate 10 MG tablet  Commonly known as: ISORDIL  Take 1 tablet (10 mg total) by mouth 3 (three) times daily.     MICROLET LANCET Misc  Generic drug: lancets     multivitamin per tablet  Commonly known as: THERAGRAN  Take 1 " tablet by mouth once daily.     oxyCODONE 5 mg Tbor  Take 1 tablet by mouth every 6 (six) hours as needed (Pain (Max 5 daily)).     pantoprazole 40 MG tablet  Commonly known as: PROTONIX  Take 40 mg by mouth once daily. Before breakfast     rosuvastatin 40 MG Tab  Commonly known as: CRESTOR  Take 40 mg by mouth once daily.     triamcinolone acetonide 0.025% 0.025 % Oint  Commonly known as: KENALOG  Apply topically 2 (two) times daily as needed (to affected area).           * This list has 2 medication(s) that are the same as other medications prescribed for you. Read the directions carefully, and ask your doctor or other care provider to review them with you.                STOP taking these medications      furosemide 40 MG tablet  Commonly known as: LASIX     losartan-hydrochlorothiazide 100-25 mg 100-25 mg per tablet  Commonly known as: HYZAAR     metFORMIN 1000 MG (MOD) 24hr tablet  Commonly known as: GLUMETZA              Indwelling Lines/Drains at time of discharge:   Lines/Drains/Airways       Central Venous Catheter Line  Duration                  Percutaneous Central Line Insertion/Assessment - Cordis 10/02/23 1650 Internal Jugular Right 16 days              Drain  Duration             Male External Urinary Catheter 10/03/23 1958 Other (Comment) 15 days                    Time spent on the discharge of patient: 35 minutes         Milton Shelton MD  Department of Hospital Medicine  [facility] - Intensive Care ([place]-16)

## 2023-10-19 NOTE — PLAN OF CARE
10/19/23 1150   Post-Acute Status   Post-Acute Authorization Placement   Post-Acute Placement Status Pending payor review/awaiting authorization (if required)   Coverage HUMANA MANAGED MEDICARE - [employer] [facility] PPO SPECIAL NEEDS   Discharge Plan   Discharge Plan A Home Health   Discharge Plan B Skilled Nursing Facility     QUINCY phoned [facility] to f/u on status of patient's dc to SNF. Per Nonya (admissions), orders and additional requested information has been received by SNF. Per Grace, auth pending as of 10/18 afternoon. Patient's dc to SNF pending payor review.    3:00pm  SW received notification that auth has been obtained for patient's admission to SNF. SNF requesting update to orders, QUINCY notified attending MD.      3:20pm  SW sent updated orders to SNF via [facility] [place]. SNF confirmed patient accepted to admit to SNF today. Transport orders to follow.    SW will continue to follow.                  AMAYA Toledo, LMSW  [facility]  Case Management  Ext. [phone]

## 2023-10-19 NOTE — SUBJECTIVE & OBJECTIVE
Interval History: Awaiting facility placement.    Review of Systems   Constitutional:  Negative for chills and fever.   Neurological:  Negative for seizures and syncope.     Objective:     Vital Signs (Most Recent):  Temp: 98.5 °F (36.9 °C) (10/19/23 0825)  Pulse: 77 (10/19/23 0825)  Resp: 18 (10/19/23 0825)  BP: (!) 169/71 (10/19/23 0825)  SpO2: 97 % (10/19/23 0825) Vital Signs (24h Range):  Temp:  [98.3 °F (36.8 °C)-99 °F (37.2 °C)] 98.5 °F (36.9 °C)  Pulse:  [74-85] 77  Resp:  [18-21] 18  SpO2:  [95 %-97 %] 97 %  BP: (135-169)/(61-71) 169/71     Weight: 112.5 kg (248 lb)  Body mass index is 36.62 kg/m².    Intake/Output Summary (Last 24 hours) at 10/19/2023 0856  Last data filed at 10/19/2023 0520  Gross per 24 hour   Intake 990 ml   Output 1450 ml   Net -460 ml           Physical Exam  Vitals and nursing note reviewed.   Constitutional:       General: He is not in acute distress.     Appearance: He is well-developed. He is obese. He is not diaphoretic.   Musculoskeletal:      Right lower leg: Edema present.      Left lower leg: Edema present.   Skin:     General: Skin is warm and dry.      Coloration: Skin is not jaundiced or pale.   Neurological:      Mental Status: He is alert and oriented to person, place, and time. Mental status is at baseline.      Motor: No seizure activity.   Psychiatric:         Attention and Perception: Attention normal.         Mood and Affect: Affect normal.         Behavior: Behavior is not aggressive or combative.             Significant Labs: All pertinent labs within the past 24 hours have been reviewed.  Recent Labs   Lab 10/17/23  0538 10/18/23  0209 10/19/23  0425   * 145 143   K 3.8 3.4* 3.5   * 111* 110   CO2 21* 22* 22*   BUN 33* 31* 31*   CREATININE 2.7* 2.8* 2.4*   CALCIUM 8.4* 8.3* 8.2*           Significant Imaging: I have reviewed all pertinent imaging results/findings within the past 24 hours.  X-Ray Chest AP Portable 10/12/23: FINDINGS:   There has been  interval placement of a leadless pacemaker overlying the left aspect of the cardiac silhouette.  There is a right-sided IJ vascular sheath.  The left-sided PICC line has been removed.  Monitoring EKG leads are present.   The trachea is unremarkable.  There is stable enlargement cardiomediastinal silhouette.  There is no evidence of free air beneath the hemidiaphragms.  There are no pleural effusions.  There is no evidence of a pneumothorax.  There is no evidence of pneumomediastinum.  The aeration of the lung fields are unchanged.  There are advanced degenerative changes in the osseous structures.   Impression:  New leadless pacemaker in place.  No apparent immediate complication.   Stable position of right-sided vascular sheath.   Interval removal of the left-sided PICC line.   Otherwise, stable examination.

## 2023-10-19 NOTE — PROGRESS NOTES
[facility] - Intensive Care ([address] Medicine  Progress Note    Patient Name: Saji Castañeda  MRN: [id number]  Patient Class: IP- Inpatient   Admission Date: 10/2/2023  Length of Stay: 17 days  Attending Physician: Milton Shelton MD  Primary Care Provider: [facility] -        Subjective:     Principal Problem:Bacteremia due to Gram-negative bacteria        HPI:  Saji Castañeda is a 73 year old Black man with hypertension, hyperlipidemia, diabetes mellitus type 2 with neuropathy, left ventricular concentric hypertrophy, paroxysmal atrial fibrillation (anticoagulated on apixaban), Mobitz type I atrioventricular heart block, chronic kidney disease stage 3, chronic venous hypertension, peripheral vascular disease, 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 He lives in [place], [place].               He presented to [facility] on 10/2/2023 from [facility] due to concerns of weakness and feeling like he was going to die. He reported no dizziness or headaches. He knew his name, where he was, and the current year but had trouble remembering which nursing home he came from. Blood glucose was in the 20s. He was given dextrose in the emergency department and glucose improved to 96 mg/dL. Labs showed acute kidney injury with creatinine of 2.6 mg/dL from baseline of 1.3 and lactic acid elevated at 2.29 mmol/L. He had complete heart block. He was admitted to Cardiology.      Overview/Hospital Course:  Saji Castañeda is a 73 year old Black man with hypertension, hyperlipidemia, diabetes mellitus type 2 with neuropathy, left ventricular concentric hypertrophy, chronic lower extremity osteomyelitis,  presented to [facility] due to concerns of weakness and  feeling like he was going to die. He had type 2 second-degree heart block. He was admitted to Cardiology.  However patient's blood pressure dropped significantly and was transferred to ICU for sepsis.  With PICC line verses acute osteomyelitis of the posterior aspect of the calcaneus as a possible source.. He was started on dobutamine, vancomycin, and piperacillin-tazobactam. Blood culture grew Acinetobacter baumannii and Klebsiella pneumoniae.Vascular Surgery suspected poor wound healing to be due to severe edema. They ordered ankle brachial indices but it was unable to be done due to the hardness of his skin. They did not recommend amputation based on their physical exam findings. Foot wound culture grew Achromobacter xylosoxidans dentrificans and Candida albicans.  Infectious Disease changed piperacillin-tazobactam to cefidericol and fluconazole. Lower extremity arterial ultrasound suggested moderate stenosis of the right anterior tibial artery and severe stenosis of the right posterior tibial artery. Vascular Surgery reviewed this and felt it would not hinder his ability to heal.   He had acute on chronic renal failure.  Repeat EKG showed 2nd degree AV block replacing junctional rhythm. Electrophysiology recommended pacemaker placement. Electrophysiology reviewed his EKG on 10/8/2023 and noted 2nd degree heart block Mobitz I. Renal function did not improve. Fractional excretion of sodium was 1.5% suggesting intrinsic cause.  Nephrology was consulted for persistent acute kidney injury and suspected ischemic acute tubular necrosis from hypoperfusion in setting of shock (on dobutamine) and symptomatic bradycardia with additional insult from sepsis (bacteremia) and vancomycin toxicity (trough ~27 on 10/4/2023). He underwent pacemaker placement on 10/12/2023. The initial plan was to discharge him to home on IV antibiotics. Case Management found it too difficult to to arrange cefidericol at home so worked on skilled  nursing facility placement.      Interval History: Awaiting facility placement.    Review of Systems   Constitutional:  Negative for chills and fever.   Neurological:  Negative for seizures and syncope.     Objective:     Vital Signs (Most Recent):  Temp: 98.5 °F (36.9 °C) (10/19/23 0825)  Pulse: 77 (10/19/23 0825)  Resp: 18 (10/19/23 0825)  BP: (!) 169/71 (10/19/23 0825)  SpO2: 97 % (10/19/23 0825) Vital Signs (24h Range):  Temp:  [98.3 °F (36.8 °C)-99 °F (37.2 °C)] 98.5 °F (36.9 °C)  Pulse:  [74-85] 77  Resp:  [18-21] 18  SpO2:  [95 %-97 %] 97 %  BP: (135-169)/(61-71) 169/71     Weight: 112.5 kg (248 lb)  Body mass index is 36.62 kg/m².    Intake/Output Summary (Last 24 hours) at 10/19/2023 0856  Last data filed at 10/19/2023 0520  Gross per 24 hour   Intake 990 ml   Output 1450 ml   Net -460 ml           Physical Exam  Vitals and nursing note reviewed.   Constitutional:       General: He is not in acute distress.     Appearance: He is well-developed. He is obese. He is not diaphoretic.   Musculoskeletal:      Right lower leg: Edema present.      Left lower leg: Edema present.   Skin:     General: Skin is warm and dry.      Coloration: Skin is not jaundiced or pale.   Neurological:      Mental Status: He is alert and oriented to person, place, and time. Mental status is at baseline.      Motor: No seizure activity.   Psychiatric:         Attention and Perception: Attention normal.         Mood and Affect: Affect normal.         Behavior: Behavior is not aggressive or combative.             Significant Labs: All pertinent labs within the past 24 hours have been reviewed.  Recent Labs   Lab 10/17/23  0538 10/18/23  0209 10/19/23  0425   * 145 143   K 3.8 3.4* 3.5   * 111* 110   CO2 21* 22* 22*   BUN 33* 31* 31*   CREATININE 2.7* 2.8* 2.4*   CALCIUM 8.4* 8.3* 8.2*           Significant Imaging: I have reviewed all pertinent imaging results/findings within the past 24 hours.  X-Ray Chest AP Portable  10/12/23: FINDINGS:   There has been interval placement of a leadless pacemaker overlying the left aspect of the cardiac silhouette.  There is a right-sided IJ vascular sheath.  The left-sided PICC line has been removed.  Monitoring EKG leads are present.   The trachea is unremarkable.  There is stable enlargement cardiomediastinal silhouette.  There is no evidence of free air beneath the hemidiaphragms.  There are no pleural effusions.  There is no evidence of a pneumothorax.  There is no evidence of pneumomediastinum.  The aeration of the lung fields are unchanged.  There are advanced degenerative changes in the osseous structures.   Impression:  New leadless pacemaker in place.  No apparent immediate complication.   Stable position of right-sided vascular sheath.   Interval removal of the left-sided PICC line.   Otherwise, stable examination.      Assessment/Plan:      * Bacteremia due to Gram-negative bacteria  ESBL Klebsiella and  Acinetobacter bacteremia. Recommendations:  1. Continue cefidericol 1g IV q8hr (CrCl 18- renally dosed) and fluconazole 200mg qd   2. No further vascular surgical plans at this time. Recommend to continue frequent wound care, off loading, and nutritional support.    3. Plan to continue IV therapy until original end of care date for osteo (10/25/23).    Complete heart block        Asymptomatic bacteriuria  Each urine culture has pyuria and bacteriuria. Urine culture on 10/2/2023 had no growth. Urine culture on 10/4/2023 grew multiple organisms with none in predominance. Urine culture on 10/9/2023 had no growth.     Blurred vision, left eye  Reports this to be chronic. Follow up with an ophthalmologist.    History of complete heart block  AV block, 2nd degree  Pacemaker placed on 10/12/2023.    Paroxysmal atrial fibrillation  Continue home apixaban.     AV block, 2nd degree  Pacemaker placed on 10/12.    Chronic osteomyelitis        Nonhealing ulcer of right lower leg with fat layer  exposed  Edema of leg  Vascular Surgery believes edema contributes to poor wound healing. They did not recommend amputation. They recommended compression wrappings. Consulted Wound Care for this. Already on medical management for PAD with clopidrogel, apixaban, atorvastatin.     Chronic deep vein thrombosis (DVT) of right upper extremity  Continue home apixaban.    Peripheral arterial disease  Continue home clopidrogel and atorvastatin.  Evaluated by vascular surgery recommended that patient was not a revascularization candidate due to extensive disease, multiple comorbidities, debility/bed bound status and concern for noncompliance.    Acute on chronic renal failure  On admission, has worsened since. Monitor labs. FENa 1.5%. Occasional eosinophils. No obstruction on renal ultrasound. Appreciate Nephrology, who believe it is multifactorial. Improving.    Insulin dependent type 2 diabetes mellitus  Holding home glipizide. He takes insulin glargine 45 units HS and insulin aspart 12 units TID. Giving insulin detemir 25 units HS, insulin aspart 5 units TID and sliding scale. Monitor Accuchecks.    Edema of leg          VTE Risk Mitigation (From admission, onward)         Ordered     apixaban tablet 5 mg  2 times daily         10/11/23 0932     IP VTE HIGH RISK PATIENT  Once         10/02/23 1513     Place sequential compression device  Until discontinued         10/02/23 1513                Discharge Planning   OXANA: 10/18/2023     Code Status: Full Code   Is the patient medically ready for discharge?: Yes    Reason for patient still in hospital (select all that apply): Pending disposition  Discharge Plan A: Home Health                  Milton Shelton MD  Department of Hospital Medicine   [facility] - Intensive Care ([place])

## 2023-10-20 ENCOUNTER — TELEPHONE (OUTPATIENT)
Dept: ELECTROPHYSIOLOGY | Facility: CLINIC | Age: 73
End: 2023-10-20
Payer: MEDICARE

## 2023-10-20 NOTE — PLAN OF CARE
[facility] - Intensive Care ([facility])  Discharge Final Note    Primary Care Provider: [facility] -    Expected Discharge Date: 10/19/2023    Final Discharge Note (most recent)       Final Note - 10/20/23 0855          Final Note    Assessment Type Final Discharge Note (P)      Anticipated Discharge Disposition Skilled Nursing Facility (P)    [facility]    What phone number can be called within the next 1-3 days to see how you are doing after discharge? [phone] (P)         Post-Acute Status    Post-Acute Authorization Placement (P)      Post-Acute Placement Status Set-up Complete/Auth obtained (P)      Coverage HU[employer] MANAGED MEDICARE - [employer]NA [employer]MO PPO SPECIAL NEEDS (P)                      Important Message from Medicare  Important Message from Medicare regarding Discharge Appeal Rights: Given to patient/caregiver, Explained to patient/caregiver, Signed/date by patient/caregiver, Other (comments) (verbal: spoke with sister (Kandy))     Date IMM was signed: 10/16/23  Time IMM was signed: 0830    Contact Info       [facility] [place]   Specialty: Home Health Services   Relationship: PCP - General    [address]  [place] [address]   Phone: [phone]       Next Steps: Follow up in 1 week(s)          Patient dc to [facility].                    Nathan Figueroa, AMAYA, LMSW  [facility]  Case Management  Ext. [phone]    Imp:  hemolytic anemia, improving  recto-vaginal fistula    Rec:  Flex sig in AM. Risks and benefits reviewed. Patient eager to proceed.

## 2023-10-20 NOTE — TELEPHONE ENCOUNTER
Patient discharged from hospital yesterday s/p Micra AV leadless PPM implanted 10/12/2023. Spoke with patient's nurse at [facility] in regards to missed in clinic f/u appointment today. Asked her to check patients room and ensure RHM is at the bedside. She stated the patient does not know where his RHM is. Will contact Pratima Zamarripa next week to resolve this issue.

## 2023-10-24 ENCOUNTER — LAB VISIT (OUTPATIENT)
Dept: LAB | Facility: HOSPITAL | Age: 73
End: 2023-10-24
Payer: MEDICARE

## 2023-10-24 ENCOUNTER — OFFICE VISIT (OUTPATIENT)
Dept: INFECTIOUS DISEASES | Facility: CLINIC | Age: 73
End: 2023-10-24
Payer: MEDICARE

## 2023-10-24 VITALS
HEART RATE: 76 BPM | TEMPERATURE: 100 F | HEIGHT: 69 IN | SYSTOLIC BLOOD PRESSURE: 154 MMHG | BODY MASS INDEX: 36.62 KG/M2 | DIASTOLIC BLOOD PRESSURE: 54 MMHG

## 2023-10-24 DIAGNOSIS — M86.672 CHRONIC OSTEOMYELITIS INVOLVING ANKLE AND FOOT, LEFT: ICD-10-CM

## 2023-10-24 DIAGNOSIS — L89.624 PRESSURE INJURY OF LEFT HEEL, STAGE 4: ICD-10-CM

## 2023-10-24 DIAGNOSIS — L89.624 PRESSURE INJURY OF LEFT HEEL, STAGE 4: Primary | ICD-10-CM

## 2023-10-24 DIAGNOSIS — R23.8 BULLAE: ICD-10-CM

## 2023-10-24 LAB
ALBUMIN SERPL BCP-MCNC: 2.4 G/DL (ref 3.5–5.2)
ALP SERPL-CCNC: 78 U/L (ref 55–135)
ALT SERPL W/O P-5'-P-CCNC: 28 U/L (ref 10–44)
ANION GAP SERPL CALC-SCNC: 14 MMOL/L (ref 8–16)
AST SERPL-CCNC: 27 U/L (ref 10–40)
BASOPHILS # BLD AUTO: 0.04 K/UL (ref 0–0.2)
BASOPHILS NFR BLD: 0.6 % (ref 0–1.9)
BILIRUB SERPL-MCNC: 0.2 MG/DL (ref 0.1–1)
BUN SERPL-MCNC: 22 MG/DL (ref 8–23)
CALCIUM SERPL-MCNC: 8.4 MG/DL (ref 8.7–10.5)
CHLORIDE SERPL-SCNC: 108 MMOL/L (ref 95–110)
CO2 SERPL-SCNC: 22 MMOL/L (ref 23–29)
CREAT SERPL-MCNC: 2 MG/DL (ref 0.5–1.4)
CRP SERPL-MCNC: 54.1 MG/L (ref 0–8.2)
DIFFERENTIAL METHOD: ABNORMAL
EOSINOPHIL # BLD AUTO: 0.5 K/UL (ref 0–0.5)
EOSINOPHIL NFR BLD: 7.5 % (ref 0–8)
ERYTHROCYTE [DISTWIDTH] IN BLOOD BY AUTOMATED COUNT: 18.4 % (ref 11.5–14.5)
EST. GFR  (NO RACE VARIABLE): 34.4 ML/MIN/1.73 M^2
GLUCOSE SERPL-MCNC: 79 MG/DL (ref 70–110)
HCT VFR BLD AUTO: 26.8 % (ref 40–54)
HGB BLD-MCNC: 8.1 G/DL (ref 14–18)
IMM GRANULOCYTES # BLD AUTO: 0.02 K/UL (ref 0–0.04)
IMM GRANULOCYTES NFR BLD AUTO: 0.3 % (ref 0–0.5)
LYMPHOCYTES # BLD AUTO: 1 K/UL (ref 1–4.8)
LYMPHOCYTES NFR BLD: 14.2 % (ref 18–48)
MCH RBC QN AUTO: 27.5 PG (ref 27–31)
MCHC RBC AUTO-ENTMCNC: 30.2 G/DL (ref 32–36)
MCV RBC AUTO: 91 FL (ref 82–98)
MONOCYTES # BLD AUTO: 0.6 K/UL (ref 0.3–1)
MONOCYTES NFR BLD: 8.5 % (ref 4–15)
NEUTROPHILS # BLD AUTO: 4.9 K/UL (ref 1.8–7.7)
NEUTROPHILS NFR BLD: 68.9 % (ref 38–73)
NRBC BLD-RTO: 0 /100 WBC
PLATELET # BLD AUTO: 292 K/UL (ref 150–450)
PMV BLD AUTO: 10.6 FL (ref 9.2–12.9)
POTASSIUM SERPL-SCNC: 3.8 MMOL/L (ref 3.5–5.1)
PROT SERPL-MCNC: 7.4 G/DL (ref 6–8.4)
RBC # BLD AUTO: 2.95 M/UL (ref 4.6–6.2)
SODIUM SERPL-SCNC: 144 MMOL/L (ref 136–145)
WBC # BLD AUTO: 7.09 K/UL (ref 3.9–12.7)

## 2023-10-24 PROCEDURE — 1111F DSCHRG MED/CURRENT MED MERGE: CPT | Mod: CPTII,S$GLB,, | Performed by: REGISTERED NURSE

## 2023-10-24 PROCEDURE — 1111F PR DISCHARGE MEDS RECONCILED W/ CURRENT OUTPATIENT MED LIST: ICD-10-PCS | Mod: CPTII,S$GLB,, | Performed by: REGISTERED NURSE

## 2023-10-24 PROCEDURE — 80053 COMPREHEN METABOLIC PANEL: CPT | Performed by: REGISTERED NURSE

## 2023-10-24 PROCEDURE — 1159F PR MEDICATION LIST DOCUMENTED IN MEDICAL RECORD: ICD-10-PCS | Mod: CPTII,S$GLB,, | Performed by: REGISTERED NURSE

## 2023-10-24 PROCEDURE — 99215 PR OFFICE/OUTPT VISIT, EST, LEVL V, 40-54 MIN: ICD-10-PCS | Mod: S$GLB,,, | Performed by: REGISTERED NURSE

## 2023-10-24 PROCEDURE — 3046F PR MOST RECENT HEMOGLOBIN A1C LEVEL > 9.0%: ICD-10-PCS | Mod: CPTII,S$GLB,, | Performed by: REGISTERED NURSE

## 2023-10-24 PROCEDURE — 99999 PR PBB SHADOW E&M-EST. PATIENT-LVL IV: ICD-10-PCS | Mod: PBBFAC,,, | Performed by: REGISTERED NURSE

## 2023-10-24 PROCEDURE — 1159F MED LIST DOCD IN RCRD: CPT | Mod: CPTII,S$GLB,, | Performed by: REGISTERED NURSE

## 2023-10-24 PROCEDURE — 3066F NEPHROPATHY DOC TX: CPT | Mod: CPTII,S$GLB,, | Performed by: REGISTERED NURSE

## 2023-10-24 PROCEDURE — 85025 COMPLETE CBC W/AUTO DIFF WBC: CPT | Performed by: REGISTERED NURSE

## 2023-10-24 PROCEDURE — 1126F AMNT PAIN NOTED NONE PRSNT: CPT | Mod: CPTII,S$GLB,, | Performed by: REGISTERED NURSE

## 2023-10-24 PROCEDURE — 3077F SYST BP >= 140 MM HG: CPT | Mod: CPTII,S$GLB,, | Performed by: REGISTERED NURSE

## 2023-10-24 PROCEDURE — 3066F PR DOCUMENTATION OF TREATMENT FOR NEPHROPATHY: ICD-10-PCS | Mod: CPTII,S$GLB,, | Performed by: REGISTERED NURSE

## 2023-10-24 PROCEDURE — 1101F PR PT FALLS ASSESS DOC 0-1 FALLS W/OUT INJ PAST YR: ICD-10-PCS | Mod: CPTII,S$GLB,, | Performed by: REGISTERED NURSE

## 2023-10-24 PROCEDURE — 3060F PR POS MICROALBUMINURIA RESULT DOCUMENTED/REVIEW: ICD-10-PCS | Mod: CPTII,S$GLB,, | Performed by: REGISTERED NURSE

## 2023-10-24 PROCEDURE — 3008F BODY MASS INDEX DOCD: CPT | Mod: CPTII,S$GLB,, | Performed by: REGISTERED NURSE

## 2023-10-24 PROCEDURE — 99999 PR PBB SHADOW E&M-EST. PATIENT-LVL IV: CPT | Mod: PBBFAC,,, | Performed by: REGISTERED NURSE

## 2023-10-24 PROCEDURE — 1101F PT FALLS ASSESS-DOCD LE1/YR: CPT | Mod: CPTII,S$GLB,, | Performed by: REGISTERED NURSE

## 2023-10-24 PROCEDURE — 3078F DIAST BP <80 MM HG: CPT | Mod: CPTII,S$GLB,, | Performed by: REGISTERED NURSE

## 2023-10-24 PROCEDURE — 99215 OFFICE O/P EST HI 40 MIN: CPT | Mod: S$GLB,,, | Performed by: REGISTERED NURSE

## 2023-10-24 PROCEDURE — 3060F POS MICROALBUMINURIA REV: CPT | Mod: CPTII,S$GLB,, | Performed by: REGISTERED NURSE

## 2023-10-24 PROCEDURE — 3008F PR BODY MASS INDEX (BMI) DOCUMENTED: ICD-10-PCS | Mod: CPTII,S$GLB,, | Performed by: REGISTERED NURSE

## 2023-10-24 PROCEDURE — 86140 C-REACTIVE PROTEIN: CPT | Performed by: REGISTERED NURSE

## 2023-10-24 PROCEDURE — 3077F PR MOST RECENT SYSTOLIC BLOOD PRESSURE >= 140 MM HG: ICD-10-PCS | Mod: CPTII,S$GLB,, | Performed by: REGISTERED NURSE

## 2023-10-24 PROCEDURE — 36415 COLL VENOUS BLD VENIPUNCTURE: CPT | Performed by: REGISTERED NURSE

## 2023-10-24 PROCEDURE — 3046F HEMOGLOBIN A1C LEVEL >9.0%: CPT | Mod: CPTII,S$GLB,, | Performed by: REGISTERED NURSE

## 2023-10-24 PROCEDURE — 3078F PR MOST RECENT DIASTOLIC BLOOD PRESSURE < 80 MM HG: ICD-10-PCS | Mod: CPTII,S$GLB,, | Performed by: REGISTERED NURSE

## 2023-10-24 PROCEDURE — 1126F PR PAIN SEVERITY QUANTIFIED, NO PAIN PRESENT: ICD-10-PCS | Mod: CPTII,S$GLB,, | Performed by: REGISTERED NURSE

## 2023-10-24 PROCEDURE — 3288F PR FALLS RISK ASSESSMENT DOCUMENTED: ICD-10-PCS | Mod: CPTII,S$GLB,, | Performed by: REGISTERED NURSE

## 2023-10-24 PROCEDURE — 3288F FALL RISK ASSESSMENT DOCD: CPT | Mod: CPTII,S$GLB,, | Performed by: REGISTERED NURSE

## 2023-10-24 NOTE — PROGRESS NOTES
Subjective     Patient ID: Saji Castañeda is a 73 y.o. male.    Chief Complaint: Hospital Follow Up    HPI      Mr. Castañeda is a 74 yo male with PMH HTN, PAD, afib, T2DM, PVD s/p left foot amputation and chronic OM of left heel who was seen inpatient recently with weakness who was found to have ESBL Klebsiella and  acinetobacter bacteremia, source was suspected PICC line vs heel wound. To note, pt was following with wound care with [facility] who recommended ED visit in early September which pt declined. Ended up presenting to  ED on 9/13 with cf sepsis 2/2 bilateral BLE wounds. Pt not amendable for amputation per previous notes. Was discharged with 6 weeks of linezolid, cefepime, and flagyl x6 weeks for osteo of left stump, EOC ~10/25/23, which he was remained  on upon admission.    Pt was followed while inpatient per ID for bacteremia and osteo. Was started on cefidericol IV on 10/5/23 to cover  acinetobacter bacteremia, as well as kleb bacteremia. It was planned for patient to continue antibiotics until ~10/25/23 to cover bacteremia, as well as left calcaneal osteo (wound culture + c. Albicans and achromobacter). To note, pt also is s/p pacemaker placement on 10/12, which pt tolerated procedure well.    Follows up today for end of care. Reports frequent wound care of left stump, 3x a week. Reports new blisters to left lower stump. Denies fever, body aches, chills. Denies drainage, foul odor. Reports pain to stump. Currently resides at [facility]. Pt reports compliance with antibiotics. Pt states he wants to establish care with [facility]/wound care. Remains with right heel wound, as well as right ankle wound. No purulence, foul odor noted. Pt with new left lower stump blisters, denies purulence, foul odor, pain. Denies frequent leg elevation.       Right heel     Right ankle     Left stump     Left stump              Oral antibiotics:  Fluconazole 200 mg PO qd                Review of Systems    Constitutional:  Negative for chills, diaphoresis, fatigue and fever.   Respiratory:  Negative for cough and shortness of breath.    Cardiovascular:  Positive for leg swelling.   Gastrointestinal:  Negative for constipation, diarrhea, nausea and vomiting.   Genitourinary:  Negative for difficulty urinating.   Musculoskeletal:  Negative for leg pain.   Neurological:  Negative for syncope and weakness.   Psychiatric/Behavioral:  Negative for agitation and confusion.           Objective     Physical Exam  Vitals reviewed.   Constitutional:       General: He is not in acute distress.     Appearance: Normal appearance. He is not ill-appearing.   HENT:      Head: Normocephalic.      Nose: Nose normal.      Mouth/Throat:      Mouth: Mucous membranes are moist.      Pharynx: Oropharynx is clear.   Eyes:      General:         Right eye: No discharge.         Left eye: No discharge.      Conjunctiva/sclera: Conjunctivae normal.   Cardiovascular:      Rate and Rhythm: Normal rate and regular rhythm.      Pulses: Normal pulses.      Heart sounds: No murmur heard.  Pulmonary:      Effort: Pulmonary effort is normal.      Breath sounds: Normal breath sounds.   Abdominal:      General: Abdomen is flat.      Palpations: Abdomen is soft.   Musculoskeletal:         General: Normal range of motion.      Cervical back: Normal range of motion.      Right lower leg: No edema.      Left lower leg: No edema.   Skin:     Findings: Erythema, lesion and rash present.   Neurological:      General: No focal deficit present.      Mental Status: He is alert and oriented to person, place, and time.   Psychiatric:         Mood and Affect: Mood normal.         Behavior: Behavior normal.         Thought Content: Thought content normal.         Judgment: Judgment normal.            Assessment and Plan     1. Pressure injury of left heel, stage 4  -     CBC Auto Differential; Future; Expected date: 10/24/2023  -     Comprehensive Metabolic Panel;  Future; Expected date: 10/24/2023  -     C-reactive protein; Future; Expected date: 10/24/2023  -     Ambulatory referral/consult to Wound Clinic; Future; Expected date: 10/31/2023  -     Cancel: CULTURE, AEROBIC  (SPECIFY SOURCE)  -     Cancel: Culture, Anaerobic  -     Ambulatory referral/consult to Podiatry; Future; Expected date: 10/31/2023    2. Chronic osteomyelitis involving ankle and foot, left  -     CBC Auto Differential; Future; Expected date: 10/24/2023  -     Comprehensive Metabolic Panel; Future; Expected date: 10/24/2023  -     C-reactive protein; Future; Expected date: 10/24/2023  -     Ambulatory referral/consult to Wound Clinic; Future; Expected date: 10/31/2023  -     Cancel: CULTURE, AEROBIC  (SPECIFY SOURCE)  -     Cancel: Culture, Anaerobic  -     Ambulatory referral/consult to Podiatry; Future; Expected date: 10/31/2023    3. Bullae  -     Ambulatory referral/consult to Podiatry; Future; Expected date: 10/31/2023        - Okay to discontinue fluconazole and cefiderocol following doses on 10/25.   - Please remove R IJ. Will repeat labs today.   - Pt with chronic lower leg swelling that is cf new bullae, as well as chronic heal and ankle wounds to right leg. No active cf infection at this time, though pt is at increased risk for recurrence.   - Pt was following with [facility] podiatry/wound care, but states he does not want to follow up with them. Recommend to reestablish with [facility] and/or wound care for wound mgmt.   - Instructed to Call or seek immediate medical attention for any fevers, chills, sweats, diarrhea, n/v or increase in pain, swelling, drainage from wound.   - will schedule follow up in 2 weeks or as needed       10/24/23: labs reviewed, without leukocytosis. pt remaining with elevated CRP, will trend.     45 minutes of total time was spent on this encounter, which includes face to face time and non-face to face time preparing to see the patient (eg, review of tests),  Obtaining and/or reviewing separately obtained history, documenting clinical information in the electronic or other health record, independently interpreting results (not separately reported) and communicating results to the patient/family/caregiver, or care coordination (not separately reported).

## 2023-10-25 ENCOUNTER — TELEPHONE (OUTPATIENT)
Dept: WOUND CARE | Facility: HOSPITAL | Age: 73
End: 2023-10-25
Payer: MEDICARE

## 2023-10-25 NOTE — TELEPHONE ENCOUNTER
Spoke to a wound care nurse at [facility] in regards to wound care referral. Nurse informed me that patient's wounds are being managed at the facility and that he does not need an OP wound clinic appt at this time.

## 2023-10-26 LAB
GLUCOSE SERPL-MCNC: 88 MG/DL (ref 70–110)
HCO3 UR-SCNC: 23.2 MMOL/L (ref 24–28)
HCT VFR BLD CALC: 23 %PCV (ref 36–54)
PCO2 BLDA: 40.1 MMHG (ref 35–45)
PH SMN: 7.37 [PH] (ref 7.35–7.45)
PO2 BLDA: 35 MMHG (ref 40–60)
POC BE: -2 MMOL/L
POC IONIZED CALCIUM: 1.18 MMOL/L (ref 1.06–1.42)
POC SATURATED O2: 66 % (ref 95–100)
POC TCO2: 24 MMOL/L (ref 24–29)
POTASSIUM BLD-SCNC: 5.1 MMOL/L (ref 3.5–5.1)
SAMPLE: ABNORMAL
SODIUM BLD-SCNC: 143 MMOL/L (ref 136–145)

## 2023-10-27 ENCOUNTER — OFFICE VISIT (OUTPATIENT)
Dept: PODIATRY | Facility: CLINIC | Age: 73
End: 2023-10-27
Payer: MEDICARE

## 2023-10-27 VITALS
HEART RATE: 61 BPM | BODY MASS INDEX: 36.73 KG/M2 | HEIGHT: 69 IN | WEIGHT: 248 LBS | SYSTOLIC BLOOD PRESSURE: 163 MMHG | DIASTOLIC BLOOD PRESSURE: 61 MMHG

## 2023-10-27 DIAGNOSIS — M86.672 CHRONIC OSTEOMYELITIS INVOLVING ANKLE AND FOOT, LEFT: ICD-10-CM

## 2023-10-27 DIAGNOSIS — R23.8 BULLAE: ICD-10-CM

## 2023-10-27 DIAGNOSIS — L89.610 PRESSURE INJURY OF RIGHT HEEL, UNSTAGEABLE: Primary | ICD-10-CM

## 2023-10-27 DIAGNOSIS — L89.624 PRESSURE INJURY OF LEFT HEEL, STAGE 4: ICD-10-CM

## 2023-10-27 DIAGNOSIS — L97.522 FOOT ULCER, LEFT, WITH FAT LAYER EXPOSED: ICD-10-CM

## 2023-10-27 PROCEDURE — 3288F PR FALLS RISK ASSESSMENT DOCUMENTED: ICD-10-PCS | Mod: CPTII,S$GLB,, | Performed by: PODIATRIST

## 2023-10-27 PROCEDURE — 1160F RVW MEDS BY RX/DR IN RCRD: CPT | Mod: CPTII,S$GLB,, | Performed by: PODIATRIST

## 2023-10-27 PROCEDURE — 1111F DSCHRG MED/CURRENT MED MERGE: CPT | Mod: CPTII,S$GLB,, | Performed by: PODIATRIST

## 2023-10-27 PROCEDURE — 1159F PR MEDICATION LIST DOCUMENTED IN MEDICAL RECORD: ICD-10-PCS | Mod: CPTII,S$GLB,, | Performed by: PODIATRIST

## 2023-10-27 PROCEDURE — 1111F PR DISCHARGE MEDS RECONCILED W/ CURRENT OUTPATIENT MED LIST: ICD-10-PCS | Mod: CPTII,S$GLB,, | Performed by: PODIATRIST

## 2023-10-27 PROCEDURE — 3078F PR MOST RECENT DIASTOLIC BLOOD PRESSURE < 80 MM HG: ICD-10-PCS | Mod: CPTII,S$GLB,, | Performed by: PODIATRIST

## 2023-10-27 PROCEDURE — 3060F PR POS MICROALBUMINURIA RESULT DOCUMENTED/REVIEW: ICD-10-PCS | Mod: CPTII,S$GLB,, | Performed by: PODIATRIST

## 2023-10-27 PROCEDURE — 3046F PR MOST RECENT HEMOGLOBIN A1C LEVEL > 9.0%: ICD-10-PCS | Mod: CPTII,S$GLB,, | Performed by: PODIATRIST

## 2023-10-27 PROCEDURE — 1160F PR REVIEW ALL MEDS BY PRESCRIBER/CLIN PHARMACIST DOCUMENTED: ICD-10-PCS | Mod: CPTII,S$GLB,, | Performed by: PODIATRIST

## 2023-10-27 PROCEDURE — 1126F AMNT PAIN NOTED NONE PRSNT: CPT | Mod: CPTII,S$GLB,, | Performed by: PODIATRIST

## 2023-10-27 PROCEDURE — 3078F DIAST BP <80 MM HG: CPT | Mod: CPTII,S$GLB,, | Performed by: PODIATRIST

## 2023-10-27 PROCEDURE — 1126F PR PAIN SEVERITY QUANTIFIED, NO PAIN PRESENT: ICD-10-PCS | Mod: CPTII,S$GLB,, | Performed by: PODIATRIST

## 2023-10-27 PROCEDURE — 1101F PR PT FALLS ASSESS DOC 0-1 FALLS W/OUT INJ PAST YR: ICD-10-PCS | Mod: CPTII,S$GLB,, | Performed by: PODIATRIST

## 2023-10-27 PROCEDURE — 3060F POS MICROALBUMINURIA REV: CPT | Mod: CPTII,S$GLB,, | Performed by: PODIATRIST

## 2023-10-27 PROCEDURE — 3077F PR MOST RECENT SYSTOLIC BLOOD PRESSURE >= 140 MM HG: ICD-10-PCS | Mod: CPTII,S$GLB,, | Performed by: PODIATRIST

## 2023-10-27 PROCEDURE — 99999 PR PBB SHADOW E&M-EST. PATIENT-LVL V: CPT | Mod: PBBFAC,,, | Performed by: PODIATRIST

## 2023-10-27 PROCEDURE — 3066F PR DOCUMENTATION OF TREATMENT FOR NEPHROPATHY: ICD-10-PCS | Mod: CPTII,S$GLB,, | Performed by: PODIATRIST

## 2023-10-27 PROCEDURE — 3288F FALL RISK ASSESSMENT DOCD: CPT | Mod: CPTII,S$GLB,, | Performed by: PODIATRIST

## 2023-10-27 PROCEDURE — 1159F MED LIST DOCD IN RCRD: CPT | Mod: CPTII,S$GLB,, | Performed by: PODIATRIST

## 2023-10-27 PROCEDURE — 99215 OFFICE O/P EST HI 40 MIN: CPT | Mod: S$GLB,,, | Performed by: PODIATRIST

## 2023-10-27 PROCEDURE — 3066F NEPHROPATHY DOC TX: CPT | Mod: CPTII,S$GLB,, | Performed by: PODIATRIST

## 2023-10-27 PROCEDURE — 3046F HEMOGLOBIN A1C LEVEL >9.0%: CPT | Mod: CPTII,S$GLB,, | Performed by: PODIATRIST

## 2023-10-27 PROCEDURE — 99999 PR PBB SHADOW E&M-EST. PATIENT-LVL V: ICD-10-PCS | Mod: PBBFAC,,, | Performed by: PODIATRIST

## 2023-10-27 PROCEDURE — 3008F BODY MASS INDEX DOCD: CPT | Mod: CPTII,S$GLB,, | Performed by: PODIATRIST

## 2023-10-27 PROCEDURE — 1101F PT FALLS ASSESS-DOCD LE1/YR: CPT | Mod: CPTII,S$GLB,, | Performed by: PODIATRIST

## 2023-10-27 PROCEDURE — 3077F SYST BP >= 140 MM HG: CPT | Mod: CPTII,S$GLB,, | Performed by: PODIATRIST

## 2023-10-27 PROCEDURE — 99215 PR OFFICE/OUTPT VISIT, EST, LEVL V, 40-54 MIN: ICD-10-PCS | Mod: S$GLB,,, | Performed by: PODIATRIST

## 2023-10-27 PROCEDURE — 3008F PR BODY MASS INDEX (BMI) DOCUMENTED: ICD-10-PCS | Mod: CPTII,S$GLB,, | Performed by: PODIATRIST

## 2023-10-27 NOTE — PROGRESS NOTES
CC:  Heel Pain (Pressure injury of left heel, stage 4, chronie osteomyelitis involving ankle and foot, left bullae)         HPI:   Saji Castañeda is a 73 y.o. male who presents to clinic today for evaluation of bilateral  foot wound.   Chronic, receiving wound care at [facility].   On antibiotics for chronic osteomyelitis.      Constitutional symptoms:  Negative for fever, chills, nights sweats, nausea, vomiting, disorientation, or increased blood sugars    Date of Initial encounter of wound:  10.27/2023 in my clinic.     Risk factors present:  neuropathy, prior foot infection, and prior foot ulceration      PCP:  [facility] -;   Last PCP visit:  Heel Pain (Pressure injury of left heel, stage 4, chronie osteomyelitis involving ankle and foot, left bullae)           Patient Active Problem List   Diagnosis    Edema of leg    Tinea pedis    Wound, open, foot with complication    Diabetic neuropathy    Essential hypertension    Insulin dependent type 2 diabetes mellitus    Cataract cortical, senile    Acute on chronic renal failure    Anemia of chronic disease    Asymptomatic Mobitz (type) I (Wenckebach's) atrioventricular block    Peripheral arterial disease    PVD (peripheral vascular disease)    Cellulitis of left ankle    Osteomyelitis of left lower extremity    Pressure injury of left heel, stage 4    Morbid obesity    Iron deficiency anemia    Bacteremia due to Gram-negative bacteria    Leg swelling    Foot pain, left    Chronic deep vein thrombosis (DVT) of right upper extremity    Nonhealing ulcer of right lower leg with fat layer exposed    Hypergranulation    Subacute osteomyelitis of left foot    Osteomyelitis of right lower extremity    Chronic osteomyelitis    Hyperglycemia due to diabetes mellitus    AV block, 2nd degree    Paroxysmal atrial fibrillation    History of complete heart block    Blurred vision, left eye    Asymptomatic bacteriuria    Complete heart block           Current Outpatient  "Medications on File Prior to Visit   Medication Sig Dispense Refill    acetaminophen (TYLENOL) 325 MG tablet Take 650 mg by mouth every 6 (six) hours as needed for Temperature greater than.      acidophilus-pectin, citrus 100 million cell-10 mg Cap Take 1 capsule by mouth 2 (two) times a day.      apixaban (ELIQUIS) 5 mg Tab Take 1 tablet (5 mg total) by mouth 2 (two) times daily. 60 tablet 0    ascorbic acid, vitamin C, (VITAMIN C) 500 MG tablet Take 500 mg by mouth 2 (two) times daily.      B COMPLEX-VITAMIN B12 tablet Take 1 tablet by mouth once daily.      BD ULTRA-FINE ADITYA PEN NEEDLE 32 gauge x 5/32" Ndle USE FOUR TIMES DAILY WITH MEALS AND NIGHTLY 100 each 0    blood sugar diagnostic (CONTOUR TEST STRIPS) Strp Inject 1 strip into the skin 2 (two) times daily before meals. 100 strip 6    diphenhydrAMINE (BENADRYL) 25 mg capsule No directions listed on the patients medication list.      gabapentin (NEURONTIN) 100 MG capsule Take 1 capsule (100 mg total) by mouth 2 (two) times daily. 60 capsule 1    glipiZIDE (GLUCOTROL) 10 MG tablet Take 20 mg by mouth 2 (two) times a day.      hydrALAZINE (APRESOLINE) 100 MG tablet Take 1 tablet (100 mg total) by mouth every 8 (eight) hours. 90 tablet 11    insulin aspart U-100 (NOVOLOG) 100 unit/mL (3 mL) InPn pen Inject 6 Units into the skin 3 (three) times daily with meals. 30 mL 3    LANTUS SOLOSTAR U-100 INSULIN glargine 100 units/mL SubQ pen Inject 45 Units into the skin every evening. (Patient taking differently: Inject 45 Units into the skin once daily.)  0    miconazole NITRATE 2 % (MICOTIN) 2 % top powder Apply topically 2 (two) times daily. 20 g 0    MICROLET LANCET Misc   0    multivitamin (THERAGRAN) per tablet Take 1 tablet by mouth once daily.      NIFEdipine (PROCARDIA-XL) 60 MG (OSM) 24 hr tablet Take 1 tablet (60 mg total) by mouth once daily. 30 tablet 2    oxyCODONE 5 mg TbOr Take 1 tablet by mouth every 6 (six) hours as needed (Pain (Max 5 daily)).      " "pantoprazole (PROTONIX) 40 MG tablet Take 40 mg by mouth once daily. Before breakfast      pen needle, diabetic 32 gauge x 5/32" Ndle use as directed with insulin 100 each 2    rosuvastatin (CRESTOR) 40 MG Tab Take 40 mg by mouth once daily.      sodium hypochlorite 0.5 % (DAKIN'S SOLUTION) external solution Apply topically once daily.      tamsulosin (FLOMAX) 0.4 mg Cap Take 0.4 mg by mouth once daily.      triamcinolone acetonide 0.025% (KENALOG) 0.025 % Oint Apply topically 2 (two) times daily as needed (to affected area).      clopidogreL (PLAVIX) 75 mg tablet Take 1 tablet (75 mg total) by mouth once daily. 60 tablet 0    isosorbide dinitrate (ISORDIL) 10 MG tablet Take 1 tablet (10 mg total) by mouth 3 (three) times daily. 90 tablet 11    [DISCONTINUED] hydroCHLOROthiazide (HYDRODIURIL) 25 MG tablet Take 0.5 tablets (12.5 mg total) by mouth every Mon, Wed, Fri. Beginning on 7/4/2022       No current facility-administered medications on file prior to visit.         Review of patient's allergies indicates:  No Known Allergies        ROS:  Negative for fever, chills, nights sweats, nausea, vomiting, disorientation, or increased blood sugars        EXAM:  Vitals:    10/27/23 1002   BP: (!) 163/61   Pulse: 61   Weight: 112.5 kg (248 lb)   Height: 5' 9" (1.753 m)        General:   well developed, no distress      Bilateral Lower extremity physical exam:    Vascular:   Dorsalis Pedis:  diminished     Posterior Tibial:  diminished  capillary refill time:  3 seconds  Edema: moderate      Neurological:     sharp dull - B/L decreased,   light touch - B/L decreased,   SWMF: Absent      Musculoskeletal:    Right foot: no gross deformity  Left foot: lis franc amputation      Dermatological:     Location:   plantar left foot amputation site  Wound: Dimensions: 4.0cm x 3.5cm x 0.2cm   Base: good granulation and healing  Exudate: Absent  Maru-wound: Normal  Edema:  Moderate   Wound site does not probe to bone  Odor is not " present   Cellulitis: none  Left foot      Ulcer Location: dorsal right foot   Measurements:   2cm x 1.5cm x 0.1cm  Periwound: (--) hyperkeratosis;  (--) necrosis  Exudate: none  Edema:  moderate  Malodor:  Absent  Base:  100% granular; 0% fibrin.  0%  Eschar  Erythema:  none  Probe to bone: no        Ulcer Location: posterior right heel  Measurements:   3.0cm x 4.0cm x 0.2cm  Periwound: (+) hyperkeratosis;  (--) necrosis  Exudate: fibrinous  Edema:  moderate  Malodor:  Absent  Base:  50% granular; 50% fibrin.  0%  Eschar  Erythema:  none  Probe to bone: no        10/27/2023  RIGHT FOOT                  LABS:  Hemoglobin A1C   Date Value Ref Range Status   09/13/2023 11.0 (H) 4.5 - 5.7 % Final   03/10/2023 8.4 (H) 4.0 - 5.6 % Final     Comment:     ADA Screening Guidelines:  5.7-6.4%  Consistent with prediabetes  >or=6.5%  Consistent with diabetes    High levels of fetal hemoglobin interfere with the HbA1C  assay. Heterozygous hemoglobin variants (HbS, HgC, etc)do  not significantly interfere with this assay.   However, presence of multiple variants may affect accuracy.     09/17/2022 9.9 (H) 4.0 - 5.6 % Final     Comment:     ADA Screening Guidelines:  5.7-6.4%  Consistent with prediabetes  >or=6.5%  Consistent with diabetes    High levels of fetal hemoglobin interfere with the HbA1C  assay. Heterozygous hemoglobin variants (HbS, HgC, etc)do  not significantly interfere with this assay.   However, presence of multiple variants may affect accuracy.     06/16/2022 9.5 (H) 4.0 - 5.6 % Final     Comment:     ADA Screening Guidelines:  5.7-6.4%  Consistent with prediabetes  >or=6.5%  Consistent with diabetes    High levels of fetal hemoglobin interfere with the HbA1C  assay. Heterozygous hemoglobin variants (HbS, HgC, etc)do  not significantly interfere with this assay.   However, presence of multiple variants may affect accuracy.               ASSESSMENT / PLAN:         Problem List Items Addressed This Visit        Pressure injury of left heel, stage 4     Other Visit Diagnoses       Pressure injury of right heel, unstageable    -  Primary    Relevant Orders    Ambulatory referral/consult to Wound Clinic    Chronic osteomyelitis involving ankle and foot, left        Bullae        Relevant Orders    Ambulatory referral/consult to Wound Clinic    Foot ulcer, left, with fat layer exposed        Relevant Orders    Ambulatory referral/consult to Wound Clinic              I counseled the patient on the patient's conditions, their implications and medical management.   I reviewed prior notes and imaging, labs.    Wound care today.   Referral to Wound clinic.   Follow up with me in two weeks if patient unable to get to Wound Clinic ([facility], or [facility])  Wound care orders:     Shelly to wounds,  Hydrofera blue over blisters, Kerlix and cast padding,  coban for gentle compression bilateral lower extremities.    Change every 2-3 days.     I spent a total of 45 minutes on the day of the visit.  This includes face to face time and non-face to face time preparing to see the patient (eg, review of tests), obtaining and/or reviewing separately obtained history, documenting clinical information in the electronic or other health record, independently interpreting results and communicating results to the patient/family/caregiver, or care coordinator.              Yanira White DPM

## 2023-11-02 ENCOUNTER — HOSPITAL ENCOUNTER (EMERGENCY)
Facility: HOSPITAL | Age: 73
Discharge: HOME | End: 2023-11-03
Attending: STUDENT IN AN ORGANIZED HEALTH CARE EDUCATION/TRAINING PROGRAM
Payer: MEDICARE

## 2023-11-02 DIAGNOSIS — Z45.2 ENCOUNTER FOR CENTRAL LINE CARE: Primary | ICD-10-CM

## 2023-11-02 PROCEDURE — 25000003 PHARM REV CODE 250: Performed by: STUDENT IN AN ORGANIZED HEALTH CARE EDUCATION/TRAINING PROGRAM

## 2023-11-02 PROCEDURE — 99283 EMERGENCY DEPT VISIT LOW MDM: CPT

## 2023-11-02 PROCEDURE — 25000003 PHARM REV CODE 250: Performed by: EMERGENCY MEDICINE

## 2023-11-02 RX ORDER — HYDRALAZINE HYDROCHLORIDE 25 MG/1
100 TABLET, FILM COATED ORAL
Status: COMPLETED | OUTPATIENT
Start: 2023-11-02 | End: 2023-11-02

## 2023-11-02 RX ORDER — NIFEDIPINE 30 MG/1
60 TABLET, EXTENDED RELEASE ORAL
Status: COMPLETED | OUTPATIENT
Start: 2023-11-02 | End: 2023-11-02

## 2023-11-02 RX ADMIN — HYDRALAZINE HYDROCHLORIDE 100 MG: 25 TABLET, FILM COATED ORAL at 07:11

## 2023-11-02 RX ADMIN — NIFEDIPINE 60 MG: 30 TABLET, FILM COATED, EXTENDED RELEASE ORAL at 09:11

## 2023-11-02 NOTE — ED NOTES
Pt states he has come to ER to have PICC line removed as his antibiotic therapy has been completed. Pt denies any pain at this time/ states recent wound infection to left stump/ankle- Pt states he will be discharged from [facility] and able to return home tomorrow post PICC removal.

## 2023-11-02 NOTE — DISCHARGE INSTRUCTIONS
Right neck central line was removed without complication. Follow-up with your doctor, return for any new or worsening symptoms.

## 2023-11-02 NOTE — ED TRIAGE NOTES
Arrives via EMS with c/o [facility] with needing midline removal, was on ABX therapy and finished, pt has no complaints

## 2023-11-02 NOTE — ED PROVIDER NOTES
Encounter Date: 11/2/2023       History     Chief Complaint   Patient presents with    Midline removal      Arrives via EMS with c/o [facility] with needing midline removal, was on ABX therapy and finished, pt has no complaints     HPI    73-year-old male past medical history diabetes, hypertension, osteomyelitis who presents to the ER for evaluation of line removal.  Patient has been admitted at [facility] for couple of weeks.  He has been on chronic IV antibiotics through a right central line of the neck for an infection.  Per nursing notes with the patient, he completed antibiotics a day ago, and is presenting today for removal of the line as he is going home tomorrow.  Patient feels well, he has no complaints.  He is on blood thinners.  He denies any fevers, chills, chest pain, dyspnea, syncope, or any headache.    Review of patient's allergies indicates:  No Known Allergies  Past Medical History:   Diagnosis Date    Arthritis     legs    Diabetes mellitus     Diabetes mellitus, type 2     Hyperlipidemia     Hypertension     Osteomyelitis     Palliative care encounter 5/24/2023     Past Surgical History:   Procedure Laterality Date    ANGIOGRAPHY OF LOWER EXTREMITY N/A 2/3/2021    Procedure: Angiogram Extremity Bilateral;  Surgeon: Ernst Chacko MD;  Location: [facility] OR [address] [place] [address];  Service: Peripheral Vascular;  Laterality: N/A;  7.4 mintues fluoroscopy time  816.15 mGy  170.17 Gycm2    AORTOGRAPHY WITH EXTREMITY RUNOFF Bilateral 2/3/2021    Procedure: AORTOGRAM, WITH EXTREMITY RUNOFF;  Surgeon: Ernst Chacko MD;  Location: [facility] OR [address] [place] [address];  Service: Peripheral Vascular;  Laterality: Bilateral;    DEBRIDEMENT OF FOOT Left 2/23/2021    Procedure: DEBRIDEMENT, LEFT HEEL;  Surgeon: Mayra Schroeder DPM;  Location: [facility] OR [address] [place] [address];  Service: Podiatry;  Laterality: Left;    FOOT AMPUTATION  October 2010    left high midfoot amputation    IMPLANTATION OF LEADLESS PACEMAKER N/A  10/12/2023    Procedure: [id number];  Surgeon: VANESSA Delatorre MD;  Location: [facility];  Service: Cardiology;  Laterality: N/A;  AVB, MICRA, EH, ANES, RM [id number]     Family History   Problem Relation Age of Onset    Diabetes Mother     Heart disease Mother     Stroke Sister     Cancer Brother     Diabetes Sister      Social History     Tobacco Use    Smoking status: Never    Smokeless tobacco: Never   Substance Use Topics    Alcohol use: No     Comment: occassional    Drug use: No     Review of Systems   Constitutional:  Negative for fever.   Respiratory:  Negative for cough.    Cardiovascular:  Negative for chest pain.   Gastrointestinal:  Negative for nausea.   Musculoskeletal:  Negative for neck pain.   Neurological:  Negative for weakness.       Physical Exam     Initial Vitals [11/02/23 1515]   BP Pulse Resp Temp SpO2   (!) 183/82 61 18 98.7 °F (37.1 °C) 100 %      MAP       --         Physical Exam    Nursing note and vitals reviewed.  Constitutional: He appears well-developed and well-nourished.   Neck: Neck supple.   Normal range of motion.  Cardiovascular:  Normal rate and regular rhythm.           Pulmonary/Chest: No respiratory distress. He has no wheezes.   Musculoskeletal:      Cervical back: Normal range of motion and neck supple.      Comments: He has lower extremity wounds that are bandaged, chronic     Neurological: He is alert and oriented to person, place, and time.   Skin:   There is a right IJ central line in place that appears intact   Psychiatric: He has a normal mood and affect. Thought content normal.         ED Course   Procedures  Labs Reviewed - No data to display       Imaging Results    None          Medications - No data to display  Medical Decision Making                           73-year-old male presenting for a central line removal.  Vital signs stable in emergency room, line appears intact in the right IJ.  I spoke with the nurse supervisor at the nursing  [place], Ms Arrington who confirmed that the doctor at the facility Dr. Bueno sent patient in for removal of the line as his therapy is completed and he no longer needs it.  Patient was placed in Trendelenburg, and the line was removed with sterile gloves and appropriate pressure.  Pressure was held for 10 minutes, and there was no bleeding afterwards.  A Tegaderm was placed over the area with gauze.  He was observed for an hour in the ER, the wound was re-evaluated, no hematoma or bleeding noted from the site.  Discharged back to facility, f/u with PCP, strict RTER precautions. Patient expressed understanding, he feels well.     Clinical Impression:   Final diagnoses:  [Z45.2] Encounter for central line care (Primary)        ED Disposition Condition    Discharge Stable          ED Prescriptions    None       Follow-up Information    None          Harris Cameron MD  11/02/23 4019       Harris Cameron MD  11/02/23 5646

## 2023-11-03 VITALS
DIASTOLIC BLOOD PRESSURE: 71 MMHG | RESPIRATION RATE: 16 BRPM | TEMPERATURE: 99 F | BODY MASS INDEX: 38.4 KG/M2 | WEIGHT: 260 LBS | HEART RATE: 52 BPM | OXYGEN SATURATION: 98 % | SYSTOLIC BLOOD PRESSURE: 163 MMHG

## 2023-11-03 NOTE — ED NOTES
Pt care assumed. Report received by BEREKET Lopez. Pt lying in stretcher in low and locked position and side rails raised x2. Call light, pt's belongings, and bedside table within pt's reach. P BP cycling every 30 minutes. Pt in NAD and verbalized no needs at this time.

## 2023-11-17 ENCOUNTER — OFFICE VISIT (OUTPATIENT)
Dept: INFECTIOUS DISEASES | Facility: CLINIC | Age: 73
End: 2023-11-17
Payer: MEDICARE

## 2023-11-17 VITALS
WEIGHT: 264.56 LBS | HEIGHT: 69 IN | SYSTOLIC BLOOD PRESSURE: 183 MMHG | TEMPERATURE: 99 F | DIASTOLIC BLOOD PRESSURE: 73 MMHG | HEART RATE: 76 BPM | BODY MASS INDEX: 39.18 KG/M2

## 2023-11-17 DIAGNOSIS — L89.624 PRESSURE INJURY OF LEFT HEEL, STAGE 4: ICD-10-CM

## 2023-11-17 DIAGNOSIS — M86.172 ACUTE OSTEOMYELITIS OF CALCANEUM, LEFT: ICD-10-CM

## 2023-11-17 DIAGNOSIS — M86.672 CHRONIC OSTEOMYELITIS INVOLVING ANKLE AND FOOT, LEFT: Primary | ICD-10-CM

## 2023-11-17 PROCEDURE — 3060F POS MICROALBUMINURIA REV: CPT | Mod: CPTII,S$GLB,, | Performed by: REGISTERED NURSE

## 2023-11-17 PROCEDURE — 1126F PR PAIN SEVERITY QUANTIFIED, NO PAIN PRESENT: ICD-10-PCS | Mod: CPTII,S$GLB,, | Performed by: REGISTERED NURSE

## 2023-11-17 PROCEDURE — 3288F FALL RISK ASSESSMENT DOCD: CPT | Mod: CPTII,S$GLB,, | Performed by: REGISTERED NURSE

## 2023-11-17 PROCEDURE — 1126F AMNT PAIN NOTED NONE PRSNT: CPT | Mod: CPTII,S$GLB,, | Performed by: REGISTERED NURSE

## 2023-11-17 PROCEDURE — 3060F PR POS MICROALBUMINURIA RESULT DOCUMENTED/REVIEW: ICD-10-PCS | Mod: CPTII,S$GLB,, | Performed by: REGISTERED NURSE

## 2023-11-17 PROCEDURE — 3078F PR MOST RECENT DIASTOLIC BLOOD PRESSURE < 80 MM HG: ICD-10-PCS | Mod: CPTII,S$GLB,, | Performed by: REGISTERED NURSE

## 2023-11-17 PROCEDURE — 1101F PT FALLS ASSESS-DOCD LE1/YR: CPT | Mod: CPTII,S$GLB,, | Performed by: REGISTERED NURSE

## 2023-11-17 PROCEDURE — 3077F PR MOST RECENT SYSTOLIC BLOOD PRESSURE >= 140 MM HG: ICD-10-PCS | Mod: CPTII,S$GLB,, | Performed by: REGISTERED NURSE

## 2023-11-17 PROCEDURE — 3046F PR MOST RECENT HEMOGLOBIN A1C LEVEL > 9.0%: ICD-10-PCS | Mod: CPTII,S$GLB,, | Performed by: REGISTERED NURSE

## 2023-11-17 PROCEDURE — 1101F PR PT FALLS ASSESS DOC 0-1 FALLS W/OUT INJ PAST YR: ICD-10-PCS | Mod: CPTII,S$GLB,, | Performed by: REGISTERED NURSE

## 2023-11-17 PROCEDURE — 3008F PR BODY MASS INDEX (BMI) DOCUMENTED: ICD-10-PCS | Mod: CPTII,S$GLB,, | Performed by: REGISTERED NURSE

## 2023-11-17 PROCEDURE — 1111F PR DISCHARGE MEDS RECONCILED W/ CURRENT OUTPATIENT MED LIST: ICD-10-PCS | Mod: CPTII,S$GLB,, | Performed by: REGISTERED NURSE

## 2023-11-17 PROCEDURE — 99214 PR OFFICE/OUTPT VISIT, EST, LEVL IV, 30-39 MIN: ICD-10-PCS | Mod: S$GLB,,, | Performed by: REGISTERED NURSE

## 2023-11-17 PROCEDURE — 99999 PR PBB SHADOW E&M-EST. PATIENT-LVL III: ICD-10-PCS | Mod: PBBFAC,,, | Performed by: REGISTERED NURSE

## 2023-11-17 PROCEDURE — 3008F BODY MASS INDEX DOCD: CPT | Mod: CPTII,S$GLB,, | Performed by: REGISTERED NURSE

## 2023-11-17 PROCEDURE — 3066F NEPHROPATHY DOC TX: CPT | Mod: CPTII,S$GLB,, | Performed by: REGISTERED NURSE

## 2023-11-17 PROCEDURE — 3046F HEMOGLOBIN A1C LEVEL >9.0%: CPT | Mod: CPTII,S$GLB,, | Performed by: REGISTERED NURSE

## 2023-11-17 PROCEDURE — 3078F DIAST BP <80 MM HG: CPT | Mod: CPTII,S$GLB,, | Performed by: REGISTERED NURSE

## 2023-11-17 PROCEDURE — 1111F DSCHRG MED/CURRENT MED MERGE: CPT | Mod: CPTII,S$GLB,, | Performed by: REGISTERED NURSE

## 2023-11-17 PROCEDURE — 3077F SYST BP >= 140 MM HG: CPT | Mod: CPTII,S$GLB,, | Performed by: REGISTERED NURSE

## 2023-11-17 PROCEDURE — 99999 PR PBB SHADOW E&M-EST. PATIENT-LVL III: CPT | Mod: PBBFAC,,, | Performed by: REGISTERED NURSE

## 2023-11-17 PROCEDURE — 99214 OFFICE O/P EST MOD 30 MIN: CPT | Mod: S$GLB,,, | Performed by: REGISTERED NURSE

## 2023-11-17 PROCEDURE — 3288F PR FALLS RISK ASSESSMENT DOCUMENTED: ICD-10-PCS | Mod: CPTII,S$GLB,, | Performed by: REGISTERED NURSE

## 2023-11-17 PROCEDURE — 3066F PR DOCUMENTATION OF TREATMENT FOR NEPHROPATHY: ICD-10-PCS | Mod: CPTII,S$GLB,, | Performed by: REGISTERED NURSE

## 2023-11-17 RX ORDER — LEVOFLOXACIN 750 MG/1
750 TABLET ORAL DAILY
Qty: 14 TABLET | Refills: 0 | Status: SHIPPED | OUTPATIENT
Start: 2023-11-17 | End: 2023-12-01

## 2023-11-17 RX ORDER — FUROSEMIDE 40 MG/1
20 TABLET ORAL
Status: ON HOLD | COMMUNITY
Start: 2023-11-03 | End: 2024-03-17 | Stop reason: HOSPADM

## 2023-11-17 RX ORDER — PEN NEEDLE, DIABETIC, SAFETY 30 GX3/16"
NEEDLE, DISPOSABLE MISCELLANEOUS
Status: ON HOLD | COMMUNITY
Start: 2023-11-03

## 2023-11-17 RX ORDER — GABAPENTIN 300 MG/1
300 CAPSULE ORAL 2 TIMES DAILY
Status: ON HOLD | COMMUNITY
Start: 2023-11-03

## 2023-11-17 RX ORDER — DOXYCYCLINE 100 MG/1
100 CAPSULE ORAL EVERY 12 HOURS
Qty: 28 CAPSULE | Refills: 0 | Status: SHIPPED | OUTPATIENT
Start: 2023-11-17 | End: 2023-11-20

## 2023-11-17 NOTE — PROGRESS NOTES
Subjective     Patient ID: Saji Castañeda is a 73 y.o. male.    Chief Complaint: Follow-up    HPI    Mr. Castañeda is a 72 yo male with PMH HTN, PAD, afib, T2DM, PVD s/p left foot amputation and chronic OM of left heel who was seen inpatient recently with weakness who was found to have ESBL Klebsiella and  acinetobacter bacteremia, source was suspected PICC line vs heel wound. Pt is with chronic wounds to right heel/foot and left leg/stump. following with wound care with [facility] who recommended ED visit in early September which pt declined. Ended up presenting to  ED on 9/13 with cf sepsis 2/2 bilateral BLE wounds. Pt not amendable for amputation per previous notes. Was discharged with 6 weeks of linezolid, cefepime, and flagyl x6 weeks for osteo of left stump, EOC ~10/25/23, which he was remained on upon admission on 10/02/23. Pt was followed while inpatient per ID for bacteremia and osteo. Was started on cefidericol IV on 10/5/23 to cover  acinetobacter bacteremia, as well as kleb bacteremia. It was planned for patient to continue antibiotics until ~10/25/23 to cover bacteremia, as well as left calcaneal osteo (wound culture + c. Albicans and achromobacter). To note, pt also is s/p pacemaker placement on 10/12, which pt tolerated procedure well. Pt was recently seen by me on 10/24/23 for end of care. Pt was residing at [facility]. He reported compliance with antibiotics. Reported frequent wound care. Reported new blisters to left leg. Denied leg elevation. Denied foul odor, purulence, fevers, chills. It was decided during our 10/24/23 visit to discontinue antibiotics following doses on 10/25. Pt was without signs/symptoms of active infection and therefore it was decided to stop therapy. Pt was consoled on risk of recurrence given the bullae and open wounds on his legs/foot. It was recommended for the pt to continue wound care and to establish with podiatry.     Pt returned to clinic today for follow  up. States he is discharged from [facility] and is at home. Denies fever, body aches chills. Reports wound care three times a week but is unable to recall the name of the home health company. Reports new drainage, foul odor to left leg wounds. Reports right foot/heel wounds are progressing nicely, without odor.       10/24 visit:   Right heel     Right ankle     Left stump     Left stump           11/17/23 visit:     Right heel wound    Right foot wound:    Left stump wound:      Left leg:             Review of Systems   Constitutional:  Negative for chills, diaphoresis, fatigue and fever.   HENT: Negative.     Eyes: Negative.    Respiratory:  Negative for cough and shortness of breath.    Cardiovascular:  Positive for leg swelling. Negative for palpitations.   Gastrointestinal:  Negative for constipation, diarrhea, nausea and vomiting.   Genitourinary:  Negative for dysuria.   Musculoskeletal:  Negative for arthralgias and leg pain.   Neurological:  Negative for dizziness.   Psychiatric/Behavioral:  Positive for confusion. Negative for agitation.           Objective     Physical Exam  Vitals reviewed.   Constitutional:       General: He is not in acute distress.     Appearance: Normal appearance. He is not ill-appearing.   HENT:      Head: Normocephalic.      Nose: Nose normal.      Mouth/Throat:      Mouth: Mucous membranes are moist.      Pharynx: Oropharynx is clear. No oropharyngeal exudate.   Eyes:      General:         Right eye: No discharge.         Left eye: No discharge.      Conjunctiva/sclera: Conjunctivae normal.   Cardiovascular:      Rate and Rhythm: Normal rate and regular rhythm.      Pulses: Normal pulses.      Heart sounds: Normal heart sounds. No murmur heard.  Pulmonary:      Effort: Pulmonary effort is normal. No respiratory distress.      Breath sounds: Normal breath sounds.   Abdominal:      General: Abdomen is flat. There is no distension.      Palpations: Abdomen is soft.    Musculoskeletal:         General: Normal range of motion.      Cervical back: Normal range of motion.      Right lower leg: No edema.      Left lower leg: No edema.   Skin:     General: Skin is warm.      Findings: Lesion present.   Neurological:      General: No focal deficit present.      Mental Status: He is alert and oriented to person, place, and time.      Motor: No weakness.      Gait: Gait normal.   Psychiatric:         Mood and Affect: Mood normal.         Behavior: Behavior normal.         Thought Content: Thought content normal.         Judgment: Judgment normal.            Assessment and Plan     1. Chronic osteomyelitis involving ankle and foot, left  -     CBC Auto Differential; Future; Expected date: 11/17/2023  -     Comprehensive Metabolic Panel; Future; Expected date: 11/17/2023  -     C-reactive protein; Future; Expected date: 11/17/2023  -     levoFLOXacin (LEVAQUIN) 750 MG tablet; Take 1 tablet (750 mg total) by mouth once daily. for 14 days  Dispense: 14 tablet; Refill: 0  -     doxycycline (VIBRAMYCIN) 100 MG Cap; Take 1 capsule (100 mg total) by mouth every 12 (twelve) hours. for 14 days  Dispense: 28 capsule; Refill: 0    2. Acute osteomyelitis of calcaneum, left  -     CBC Auto Differential; Future; Expected date: 11/17/2023  -     Comprehensive Metabolic Panel; Future; Expected date: 11/17/2023  -     C-reactive protein; Future; Expected date: 11/17/2023  -     levoFLOXacin (LEVAQUIN) 750 MG tablet; Take 1 tablet (750 mg total) by mouth once daily. for 14 days  Dispense: 14 tablet; Refill: 0  -     doxycycline (VIBRAMYCIN) 100 MG Cap; Take 1 capsule (100 mg total) by mouth every 12 (twelve) hours. for 14 days  Dispense: 28 capsule; Refill: 0    3. Pressure injury of left heel, stage 4  -     CBC Auto Differential; Future; Expected date: 11/17/2023  -     Comprehensive Metabolic Panel; Future; Expected date: 11/17/2023  -     C-reactive protein; Future; Expected date: 11/17/2023  -      levoFLOXacin (LEVAQUIN) 750 MG tablet; Take 1 tablet (750 mg total) by mouth once daily. for 14 days  Dispense: 14 tablet; Refill: 0  -     doxycycline (VIBRAMYCIN) 100 MG Cap; Take 1 capsule (100 mg total) by mouth every 12 (twelve) hours. for 14 days  Dispense: 28 capsule; Refill: 0        Pt with significant micro history:     3/23: staph pasterui bacteremia   3/10/23: proteus mirabilis from left heel wound:  5/16/23: pseudomonas aeruginosa, kleb pneumoniae, proteus penneri from right foot wound   10/02/23:  acinetobacter baumannii, CONS, klebsiella pneumonaie ESBL bacteremia - treated ~x3 weeks with cefiderocol and fluconazole - discontinued 10/25/23  10/02/23: candida albicans, achromobacter xylosoxidans from left foot wound - treated ~x3 weeks with cefiderocol and fluconazole - discontinued 10/25/23      Pt with copious amount of foul smelling, green discharge from left leg wounds. Cf recurrence of infection. Right ankle/heel wounds without purulence or foul odor. Will restart empiric antibiotic therapy and treat x2 weeks for SSTI. Will start minocycline to cover past  acinetobacter as well as Levaquin to cover other past organisms.       - start levaquin 750 mg PO qday  - start minocycline 200 mg PO BID   - continue wound care at minimum 2/weekly, discussed with patient at length the importance of frequent wound care. Encouraged pt to reach out to family/support system to help with dressing changes with wound care team is not available.   - follow up with Dr. White with podiatry   - follow up with ID clinic in 2 weeks, scheduled Dec 1st 0900.            30 minutes of total time was spent on this encounter, which includes face to face time and non-face to face time preparing to see the patient (eg, review of tests), Obtaining and/or reviewing separately obtained history, documenting clinical information in the electronic or other health record, independently interpreting results (not separately  reported) and communicating results to the patient/family/caregiver, or care coordination (not separately reported).

## 2023-11-20 RX ORDER — MINOCYCLINE HYDROCHLORIDE 100 MG/1
200 CAPSULE ORAL 2 TIMES DAILY
Qty: 56 CAPSULE | Refills: 0 | Status: SHIPPED | OUTPATIENT
Start: 2023-11-20 | End: 2023-12-04

## 2023-11-24 DIAGNOSIS — I49.8 OTHER CARDIAC ARRHYTHMIA: Primary | ICD-10-CM

## 2023-12-01 ENCOUNTER — OFFICE VISIT (OUTPATIENT)
Dept: INFECTIOUS DISEASES | Facility: CLINIC | Age: 73
End: 2023-12-01
Payer: MEDICARE

## 2023-12-01 VITALS
WEIGHT: 244.06 LBS | DIASTOLIC BLOOD PRESSURE: 67 MMHG | SYSTOLIC BLOOD PRESSURE: 127 MMHG | HEIGHT: 69 IN | BODY MASS INDEX: 36.15 KG/M2 | HEART RATE: 97 BPM | TEMPERATURE: 99 F

## 2023-12-01 DIAGNOSIS — L89.624 PRESSURE INJURY OF LEFT HEEL, STAGE 4: ICD-10-CM

## 2023-12-01 DIAGNOSIS — L97.909 CHRONIC ULCER OF LOWER EXTREMITY, UNSPECIFIED LATERALITY, UNSPECIFIED ULCER STAGE: Primary | ICD-10-CM

## 2023-12-01 DIAGNOSIS — M86.172 ACUTE OSTEOMYELITIS OF CALCANEUM, LEFT: ICD-10-CM

## 2023-12-01 DIAGNOSIS — M86.672 CHRONIC OSTEOMYELITIS INVOLVING ANKLE AND FOOT, LEFT: ICD-10-CM

## 2023-12-01 PROCEDURE — 3078F DIAST BP <80 MM HG: CPT | Mod: CPTII,S$GLB,, | Performed by: REGISTERED NURSE

## 2023-12-01 PROCEDURE — 1101F PR PT FALLS ASSESS DOC 0-1 FALLS W/OUT INJ PAST YR: ICD-10-PCS | Mod: CPTII,S$GLB,, | Performed by: REGISTERED NURSE

## 2023-12-01 PROCEDURE — 99999 PR PBB SHADOW E&M-EST. PATIENT-LVL V: ICD-10-PCS | Mod: PBBFAC,,, | Performed by: REGISTERED NURSE

## 2023-12-01 PROCEDURE — 3066F NEPHROPATHY DOC TX: CPT | Mod: CPTII,S$GLB,, | Performed by: REGISTERED NURSE

## 2023-12-01 PROCEDURE — 99999 PR PBB SHADOW E&M-EST. PATIENT-LVL V: CPT | Mod: PBBFAC,,, | Performed by: REGISTERED NURSE

## 2023-12-01 PROCEDURE — 3074F PR MOST RECENT SYSTOLIC BLOOD PRESSURE < 130 MM HG: ICD-10-PCS | Mod: CPTII,S$GLB,, | Performed by: REGISTERED NURSE

## 2023-12-01 PROCEDURE — 3288F PR FALLS RISK ASSESSMENT DOCUMENTED: ICD-10-PCS | Mod: CPTII,S$GLB,, | Performed by: REGISTERED NURSE

## 2023-12-01 PROCEDURE — 3074F SYST BP LT 130 MM HG: CPT | Mod: CPTII,S$GLB,, | Performed by: REGISTERED NURSE

## 2023-12-01 PROCEDURE — 99214 PR OFFICE/OUTPT VISIT, EST, LEVL IV, 30-39 MIN: ICD-10-PCS | Mod: S$GLB,,, | Performed by: REGISTERED NURSE

## 2023-12-01 PROCEDURE — 1101F PT FALLS ASSESS-DOCD LE1/YR: CPT | Mod: CPTII,S$GLB,, | Performed by: REGISTERED NURSE

## 2023-12-01 PROCEDURE — 99214 OFFICE O/P EST MOD 30 MIN: CPT | Mod: S$GLB,,, | Performed by: REGISTERED NURSE

## 2023-12-01 PROCEDURE — 1125F AMNT PAIN NOTED PAIN PRSNT: CPT | Mod: CPTII,S$GLB,, | Performed by: REGISTERED NURSE

## 2023-12-01 PROCEDURE — 3046F PR MOST RECENT HEMOGLOBIN A1C LEVEL > 9.0%: ICD-10-PCS | Mod: CPTII,S$GLB,, | Performed by: REGISTERED NURSE

## 2023-12-01 PROCEDURE — 3066F PR DOCUMENTATION OF TREATMENT FOR NEPHROPATHY: ICD-10-PCS | Mod: CPTII,S$GLB,, | Performed by: REGISTERED NURSE

## 2023-12-01 PROCEDURE — 3046F HEMOGLOBIN A1C LEVEL >9.0%: CPT | Mod: CPTII,S$GLB,, | Performed by: REGISTERED NURSE

## 2023-12-01 PROCEDURE — 3060F PR POS MICROALBUMINURIA RESULT DOCUMENTED/REVIEW: ICD-10-PCS | Mod: CPTII,S$GLB,, | Performed by: REGISTERED NURSE

## 2023-12-01 PROCEDURE — 3008F PR BODY MASS INDEX (BMI) DOCUMENTED: ICD-10-PCS | Mod: CPTII,S$GLB,, | Performed by: REGISTERED NURSE

## 2023-12-01 PROCEDURE — 3060F POS MICROALBUMINURIA REV: CPT | Mod: CPTII,S$GLB,, | Performed by: REGISTERED NURSE

## 2023-12-01 PROCEDURE — 3008F BODY MASS INDEX DOCD: CPT | Mod: CPTII,S$GLB,, | Performed by: REGISTERED NURSE

## 2023-12-01 PROCEDURE — 3288F FALL RISK ASSESSMENT DOCD: CPT | Mod: CPTII,S$GLB,, | Performed by: REGISTERED NURSE

## 2023-12-01 PROCEDURE — 3078F PR MOST RECENT DIASTOLIC BLOOD PRESSURE < 80 MM HG: ICD-10-PCS | Mod: CPTII,S$GLB,, | Performed by: REGISTERED NURSE

## 2023-12-01 PROCEDURE — 1125F PR PAIN SEVERITY QUANTIFIED, PAIN PRESENT: ICD-10-PCS | Mod: CPTII,S$GLB,, | Performed by: REGISTERED NURSE

## 2023-12-01 NOTE — PROGRESS NOTES
Subjective     Patient ID: Saji Castañeda is a 73 y.o. male.    Chief Complaint: Follow-up    HPI    Mr. Castañeda is a 72 yo male with PMH HTN, PAD, afib, T2DM, PVD s/p left foot amputation and chronic OM of left heel who was seen inpatient recently with weakness who was found to have ESBL Klebsiella and  acinetobacter bacteremia, source was suspected PICC line vs heel wound. Pt is with chronic wounds to right heel/foot and left leg/stump. following with wound care with [facility] who recommended ED visit in early September which pt declined. Ended up presenting to  ED on 9/13 with cf sepsis 2/2 bilateral BLE wounds. Pt not amendable for amputation per previous notes. Was discharged with 6 weeks of linezolid, cefepime, and flagyl x6 weeks for osteo of left stump, EOC ~10/25/23, which he was remained on upon admission on 10/02/23. Pt was followed while inpatient per ID for bacteremia and osteo. Was started on cefidericol IV on 10/5/23 to cover  acinetobacter bacteremia, as well as kleb bacteremia. It was planned for patient to continue antibiotics until ~10/25/23 to cover bacteremia, as well as left calcaneal osteo (wound culture + c. Albicans and achromobacter). To note, pt also is s/p pacemaker placement on 10/12, which pt tolerated procedure well. Pt was recently seen by me on 10/24/23 for end of care. Pt was residing at [facility]. He reported compliance with antibiotics. Reported frequent wound care. Reported new blisters to left leg. Denied leg elevation. Denied foul odor, purulence, fevers, chills. It was decided during our 10/24/23 visit to discontinue antibiotics following doses on 10/25. Pt was without signs/symptoms of active infection and therefore it was decided to stop therapy. Pt was consoled on risk of recurrence given the bullae and open wounds on his legs/foot. It was recommended for the pt to continue wound care and to establish with podiatry.      Pt returned for follow up on  23, and was found to have drainage, as well as foul odor to left leg/stump. Right foot/heel wounds appeared stable without infectious concerns. Continued to get wound care twice weekly. Stated he was unable to change dressings his self. Denied fever, or systemic signs of infection. Was started on minocycline and Levaquin for suspected infection. States he is discharged from [facility] and is at home. Denies fever, body aches chills.     Today, presents for follow up. Continues on minocycline and levaquin. States compliance. Denies fever, body aches, chills. Denies NVD. Reports wound care 2/weekly. Has appt scheduled with podiatry on Monday. Remains with drainage and foul odor to left leg. Denies concerns for right foot. Continues without support at home to assist in wound care.     See photos of wound progression below:         10/24/23    Right heel     Right ankle     Left stump     Left stump              23 visit:      Right heel wound    Right foot wound:    Left stump wound:      Left le/1/23 visit:    Right ankle:    Right heel    Left leg: posterior     Left stump:    Left leg:           Review of Systems   Constitutional:  Negative for chills, diaphoresis, fatigue and fever.   HENT: Negative.     Eyes: Negative.    Respiratory:  Negative for cough and shortness of breath.    Cardiovascular:  Positive for leg swelling.   Gastrointestinal:  Negative for diarrhea, nausea and vomiting.   Neurological:  Negative for dizziness.          Objective     Physical Exam  Vitals reviewed.   Constitutional:       General: He is not in acute distress.     Appearance: He is not ill-appearing.   HENT:      Nose: Nose normal.      Mouth/Throat:      Mouth: Mucous membranes are moist.      Pharynx: Oropharynx is clear.   Eyes:      Conjunctiva/sclera: Conjunctivae normal.   Pulmonary:      Effort: Pulmonary effort is normal. No respiratory distress.      Breath sounds: No stridor.   Abdominal:       General: Abdomen is flat. There is no distension.      Palpations: Abdomen is soft.   Musculoskeletal:         General: Swelling and deformity present. No tenderness.      Cervical back: Normal range of motion.      Right lower leg: Edema present.      Left lower leg: Edema present.   Skin:     Findings: Erythema, lesion and rash present.      Comments: See media. Copious drainage from LLE, also with malodor.    Neurological:      Mental Status: He is alert and oriented to person, place, and time. Mental status is at baseline.   Psychiatric:         Mood and Affect: Mood normal.         Behavior: Behavior normal.            Assessment and Plan     1. Chronic ulcer of lower extremity, unspecified laterality, unspecified ulcer stage      - Reached out to home health nurse and discussed concerns for antibiotic compliance. States it appears he is not taking his evening meds. Will extend antibiotics and continue close follow up. Verbal order placed to increase home health wound care visits to 3 times weekly.   - will continue antibiotics x2 weeks, levaquin 750 mg PO qd and minocycline 100 mg PO BID.  - pt plans to follow up with Dr Cuenca with podiatry on 12/4  - encouraged leg elevation, tight glycemic control, as well as frequent wound care  - explained risks of ongoing, worsening wounds, which include systemic infection           30 minutes of total time was spent on this encounter, which includes face to face time and non-face to face time preparing to see the patient (eg, review of tests), Obtaining and/or reviewing separately obtained history, documenting clinical information in the electronic or other health record, independently interpreting results (not separately reported) and communicating results to the patient/family/caregiver, or care coordination (not separately reported).              No follow-ups on file.

## 2023-12-04 ENCOUNTER — OFFICE VISIT (OUTPATIENT)
Dept: PODIATRY | Facility: CLINIC | Age: 73
End: 2023-12-04
Payer: MEDICARE

## 2023-12-04 VITALS
HEIGHT: 69 IN | SYSTOLIC BLOOD PRESSURE: 127 MMHG | HEART RATE: 99 BPM | DIASTOLIC BLOOD PRESSURE: 66 MMHG | BODY MASS INDEX: 36.04 KG/M2

## 2023-12-04 DIAGNOSIS — R23.8 BULLAE: ICD-10-CM

## 2023-12-04 DIAGNOSIS — L97.512 TOE ULCER, RIGHT, WITH FAT LAYER EXPOSED: ICD-10-CM

## 2023-12-04 DIAGNOSIS — L89.610 PRESSURE INJURY OF RIGHT HEEL, UNSTAGEABLE: ICD-10-CM

## 2023-12-04 DIAGNOSIS — L89.624 PRESSURE INJURY OF LEFT HEEL, STAGE 4: Primary | ICD-10-CM

## 2023-12-04 DIAGNOSIS — E11.51 TYPE II DIABETES MELLITUS WITH PERIPHERAL CIRCULATORY DISORDER: ICD-10-CM

## 2023-12-04 DIAGNOSIS — M86.672 CHRONIC OSTEOMYELITIS INVOLVING ANKLE AND FOOT, LEFT: ICD-10-CM

## 2023-12-04 DIAGNOSIS — L97.522 FOOT ULCER, LEFT, WITH FAT LAYER EXPOSED: ICD-10-CM

## 2023-12-04 DIAGNOSIS — E11.49 TYPE II DIABETES MELLITUS WITH NEUROLOGICAL MANIFESTATIONS: ICD-10-CM

## 2023-12-04 PROCEDURE — 3060F POS MICROALBUMINURIA REV: CPT | Mod: CPTII,S$GLB,, | Performed by: PODIATRIST

## 2023-12-04 PROCEDURE — 3066F NEPHROPATHY DOC TX: CPT | Mod: CPTII,S$GLB,, | Performed by: PODIATRIST

## 2023-12-04 PROCEDURE — 1160F PR REVIEW ALL MEDS BY PRESCRIBER/CLIN PHARMACIST DOCUMENTED: ICD-10-PCS | Mod: CPTII,S$GLB,, | Performed by: PODIATRIST

## 2023-12-04 PROCEDURE — 1160F RVW MEDS BY RX/DR IN RCRD: CPT | Mod: CPTII,S$GLB,, | Performed by: PODIATRIST

## 2023-12-04 PROCEDURE — 3008F BODY MASS INDEX DOCD: CPT | Mod: CPTII,S$GLB,, | Performed by: PODIATRIST

## 2023-12-04 PROCEDURE — 3008F PR BODY MASS INDEX (BMI) DOCUMENTED: ICD-10-PCS | Mod: CPTII,S$GLB,, | Performed by: PODIATRIST

## 2023-12-04 PROCEDURE — 1159F MED LIST DOCD IN RCRD: CPT | Mod: CPTII,S$GLB,, | Performed by: PODIATRIST

## 2023-12-04 PROCEDURE — 3078F DIAST BP <80 MM HG: CPT | Mod: CPTII,S$GLB,, | Performed by: PODIATRIST

## 2023-12-04 PROCEDURE — 3060F PR POS MICROALBUMINURIA RESULT DOCUMENTED/REVIEW: ICD-10-PCS | Mod: CPTII,S$GLB,, | Performed by: PODIATRIST

## 2023-12-04 PROCEDURE — 3078F PR MOST RECENT DIASTOLIC BLOOD PRESSURE < 80 MM HG: ICD-10-PCS | Mod: CPTII,S$GLB,, | Performed by: PODIATRIST

## 2023-12-04 PROCEDURE — 3074F SYST BP LT 130 MM HG: CPT | Mod: CPTII,S$GLB,, | Performed by: PODIATRIST

## 2023-12-04 PROCEDURE — 1159F PR MEDICATION LIST DOCUMENTED IN MEDICAL RECORD: ICD-10-PCS | Mod: CPTII,S$GLB,, | Performed by: PODIATRIST

## 2023-12-04 PROCEDURE — 99999 PR PBB SHADOW E&M-EST. PATIENT-LVL V: CPT | Mod: PBBFAC,,, | Performed by: PODIATRIST

## 2023-12-04 PROCEDURE — 99215 OFFICE O/P EST HI 40 MIN: CPT | Mod: S$GLB,,, | Performed by: PODIATRIST

## 2023-12-04 PROCEDURE — 1125F AMNT PAIN NOTED PAIN PRSNT: CPT | Mod: CPTII,S$GLB,, | Performed by: PODIATRIST

## 2023-12-04 PROCEDURE — 3046F PR MOST RECENT HEMOGLOBIN A1C LEVEL > 9.0%: ICD-10-PCS | Mod: CPTII,S$GLB,, | Performed by: PODIATRIST

## 2023-12-04 PROCEDURE — 1125F PR PAIN SEVERITY QUANTIFIED, PAIN PRESENT: ICD-10-PCS | Mod: CPTII,S$GLB,, | Performed by: PODIATRIST

## 2023-12-04 PROCEDURE — 99215 PR OFFICE/OUTPT VISIT, EST, LEVL V, 40-54 MIN: ICD-10-PCS | Mod: S$GLB,,, | Performed by: PODIATRIST

## 2023-12-04 PROCEDURE — 3046F HEMOGLOBIN A1C LEVEL >9.0%: CPT | Mod: CPTII,S$GLB,, | Performed by: PODIATRIST

## 2023-12-04 PROCEDURE — 3074F PR MOST RECENT SYSTOLIC BLOOD PRESSURE < 130 MM HG: ICD-10-PCS | Mod: CPTII,S$GLB,, | Performed by: PODIATRIST

## 2023-12-04 PROCEDURE — 99999 PR PBB SHADOW E&M-EST. PATIENT-LVL V: ICD-10-PCS | Mod: PBBFAC,,, | Performed by: PODIATRIST

## 2023-12-04 PROCEDURE — 3066F PR DOCUMENTATION OF TREATMENT FOR NEPHROPATHY: ICD-10-PCS | Mod: CPTII,S$GLB,, | Performed by: PODIATRIST

## 2023-12-04 NOTE — PROGRESS NOTES
Subjective:      Patient ID: Saji Castañeda is a 73 y.o. male.    Chief Complaint: Wound Check (Bilateral edema with drainage and open wounds )    Saji Castañeda is a 73 y.o. male with  has a past medical history of Arthritis, Diabetes mellitus, Diabetes mellitus, type 2, Hyperlipidemia, Hypertension, Osteomyelitis, and Palliative care encounter. presents to the podiatry clinic for care of  multiple wounds to bilateral lower extremity.  Patient was last seen in Podiatry by my colleague Dr. White on 10/27/2023 he was then by Infectious Disease on both 11/17/2023 and 12/01/2023 and is currently on p.o. antibiosis.  Patient has home health wound care 2-3 times weekly.  He relates that he was having some excess drainage but it believes it may be improving.      Previously seen by my colleague, new to me.    Shoe gear: clara shoe    Chief Complaint   Patient presents with    Wound Check     Bilateral edema with drainage and open wounds        Hemoglobin A1C   Date Value Ref Range Status   09/13/2023 11.0 (H) 4.5 - 5.7 % Final   03/10/2023 8.4 (H) 4.0 - 5.6 % Final     Comment:     ADA Screening Guidelines:  5.7-6.4%  Consistent with prediabetes  >or=6.5%  Consistent with diabetes    High levels of fetal hemoglobin interfere with the HbA1C  assay. Heterozygous hemoglobin variants (HbS, HgC, etc)do  not significantly interfere with this assay.   However, presence of multiple variants may affect accuracy.     09/17/2022 9.9 (H) 4.0 - 5.6 % Final     Comment:     ADA Screening Guidelines:  5.7-6.4%  Consistent with prediabetes  >or=6.5%  Consistent with diabetes    High levels of fetal hemoglobin interfere with the HbA1C  assay. Heterozygous hemoglobin variants (HbS, HgC, etc)do  not significantly interfere with this assay.   However, presence of multiple variants may affect accuracy.     06/16/2022 9.5 (H) 4.0 - 5.6 % Final     Comment:     ADA Screening Guidelines:  5.7-6.4%  Consistent with prediabetes  >or=6.5%   Consistent with diabetes    High levels of fetal hemoglobin interfere with the HbA1C  assay. Heterozygous hemoglobin variants (HbS, HgC, etc)do  not significantly interfere with this assay.   However, presence of multiple variants may affect accuracy.               Patient Active Problem List   Diagnosis    Edema of leg    Tinea pedis    Wound, open, foot with complication    Diabetic neuropathy    Essential hypertension    Insulin dependent type 2 diabetes mellitus    Cataract cortical, senile    Acute on chronic renal failure    Anemia of chronic disease    Asymptomatic Mobitz (type) I (Wenckebach's) atrioventricular block    Peripheral arterial disease    PVD (peripheral vascular disease)    Cellulitis of left ankle    Osteomyelitis of left lower extremity    Pressure injury of left heel, stage 4    Morbid obesity    Iron deficiency anemia    Bacteremia due to Gram-negative bacteria    Leg swelling    Foot pain, left    Chronic deep vein thrombosis (DVT) of right upper extremity    Nonhealing ulcer of right lower leg with fat layer exposed    Hypergranulation    Subacute osteomyelitis of left foot    Osteomyelitis of right lower extremity    Chronic osteomyelitis    Hyperglycemia due to diabetes mellitus    AV block, 2nd degree    Paroxysmal atrial fibrillation    History of complete heart block    Blurred vision, left eye    Asymptomatic bacteriuria    Complete heart block       Current Outpatient Medications on File Prior to Visit   Medication Sig Dispense Refill    acetaminophen (TYLENOL) 325 MG tablet Take 650 mg by mouth every 6 (six) hours as needed for Temperature greater than.      acidophilus-pectin, citrus 100 million cell-10 mg Cap Take 1 capsule by mouth 2 (two) times a day.      apixaban (ELIQUIS) 5 mg Tab Take 1 tablet (5 mg total) by mouth 2 (two) times daily. 60 tablet 0    ascorbic acid, vitamin C, (VITAMIN C) 500 MG tablet Take 500 mg by mouth 2 (two) times daily.      B COMPLEX-VITAMIN B12  "tablet Take 1 tablet by mouth once daily.      BD AUTOSHIELD DUO PEN NEEDLE 30 gauge x 3/16" Ndle       BD ULTRA-FINE ADITYA PEN NEEDLE 32 gauge x 5/32" Ndle USE FOUR TIMES DAILY WITH MEALS AND NIGHTLY 100 each 0    blood sugar diagnostic (CONTOUR TEST STRIPS) Strp Inject 1 strip into the skin 2 (two) times daily before meals. 100 strip 6    diphenhydrAMINE (BENADRYL) 25 mg capsule No directions listed on the patients medication list.      furosemide (LASIX) 40 MG tablet Take 20 mg by mouth.      gabapentin (NEURONTIN) 100 MG capsule Take 1 capsule (100 mg total) by mouth 2 (two) times daily. 60 capsule 1    gabapentin (NEURONTIN) 300 MG capsule Take 300 mg by mouth 2 (two) times daily.      glipiZIDE (GLUCOTROL) 10 MG tablet Take 20 mg by mouth 2 (two) times a day.      hydrALAZINE (APRESOLINE) 100 MG tablet Take 1 tablet (100 mg total) by mouth every 8 (eight) hours. 90 tablet 11    insulin aspart U-100 (NOVOLOG) 100 unit/mL (3 mL) InPn pen Inject 6 Units into the skin 3 (three) times daily with meals. 30 mL 3    LANTUS SOLOSTAR U-100 INSULIN glargine 100 units/mL SubQ pen Inject 45 Units into the skin every evening. (Patient taking differently: Inject 45 Units into the skin once daily.)  0    miconazole NITRATE 2 % (MICOTIN) 2 % top powder Apply topically 2 (two) times daily. 20 g 0    MICROLET LANCET Misc   0    [] minocycline (MINOCIN,DYNACIN) 100 MG capsule Take 2 capsules (200 mg total) by mouth 2 (two) times daily. for 14 days 56 capsule 0    multivitamin (THERAGRAN) per tablet Take 1 tablet by mouth once daily.      NIFEdipine (PROCARDIA-XL) 60 MG (OSM) 24 hr tablet Take 1 tablet (60 mg total) by mouth once daily. 30 tablet 2    pantoprazole (PROTONIX) 40 MG tablet Take 40 mg by mouth once daily. Before breakfast      pen needle, diabetic 32 gauge x 5/32" Ndle use as directed with insulin 100 each 2    rosuvastatin (CRESTOR) 40 MG Tab Take 40 mg by mouth once daily.      sodium hypochlorite 0.5 % " (DAKIN'S SOLUTION) external solution Apply topically once daily.      tamsulosin (FLOMAX) 0.4 mg Cap Take 0.4 mg by mouth once daily.      triamcinolone acetonide 0.025% (KENALOG) 0.025 % Oint Apply topically 2 (two) times daily as needed (to affected area).      clopidogreL (PLAVIX) 75 mg tablet Take 1 tablet (75 mg total) by mouth once daily. 60 tablet 0    isosorbide dinitrate (ISORDIL) 10 MG tablet Take 1 tablet (10 mg total) by mouth 3 (three) times daily. 90 tablet 11    oxyCODONE 5 mg TbOr Take 1 tablet by mouth every 6 (six) hours as needed (Pain (Max 5 daily)).      [DISCONTINUED] hydroCHLOROthiazide (HYDRODIURIL) 25 MG tablet Take 0.5 tablets (12.5 mg total) by mouth every Mon, Wed, Fri. Beginning on 7/4/2022       No current facility-administered medications on file prior to visit.       Review of patient's allergies indicates:  No Known Allergies    Past Surgical History:   Procedure Laterality Date    ANGIOGRAPHY OF LOWER EXTREMITY N/A 2/3/2021    Procedure: Angiogram Extremity Bilateral;  Surgeon: Ernst Chacko MD;  Location: [facility] OR [address] [place] [address];  Service: Peripheral Vascular;  Laterality: N/A;  7.4 mintues fluoroscopy time  816.15 mGy  170.17 Gycm2    AORTOGRAPHY WITH EXTREMITY RUNOFF Bilateral 2/3/2021    Procedure: AORTOGRAM, WITH EXTREMITY RUNOFF;  Surgeon: Ernst Chacko MD;  Location: [facility] OR [address] [place] [address];  Service: Peripheral Vascular;  Laterality: Bilateral;    DEBRIDEMENT OF FOOT Left 2/23/2021    Procedure: DEBRIDEMENT, LEFT HEEL;  Surgeon: Mayra Schroeder DPM;  Location: [facility] OR [address] [place] [address];  Service: Podiatry;  Laterality: Left;    FOOT AMPUTATION  October 2010    left high midfoot amputation    IMPLANTATION OF LEADLESS PACEMAKER N/A 10/12/2023    Procedure: [id number];  Surgeon: VANESSA Delatorre MD;  Location: [facility] EP LAB;  Service: Cardiology;  Laterality: N/A;  AVB, MICRA, EH, ANES, [place] [id number]       Family History   Problem Relation Age of Onset    Diabetes  Mother     Heart disease Mother     Stroke Sister     Cancer Brother     Diabetes Sister        Social History     Socioeconomic History    Marital status: Single   Tobacco Use    Smoking status: Never    Smokeless tobacco: Never   Substance and Sexual Activity    Alcohol use: No     Comment: occassional    Drug use: No    Sexual activity: Not Currently   Social History Narrative    Not currently working; lives with family     Social Determinants of Health     Financial Resource Strain: Low Risk  (10/3/2023)    Overall Financial Resource Strain (CARDIA)     Difficulty of Paying Living Expenses: Not hard at all   Food Insecurity: No Food Insecurity (10/3/2023)    Hunger Vital Sign     Worried About Running Out of Food in the Last Year: Never true     Ran Out of Food in the Last Year: Never true   Transportation Needs: No Transportation Needs (10/3/2023)    PRAPARE - Transportation     Lack of Transportation (Medical): No     Lack of Transportation (Non-Medical): No   Physical Activity: Unknown (10/3/2023)    Exercise Vital Sign     Days of Exercise per Week: Patient refused     Minutes of Exercise per Session: Patient refused   Stress: Stress Concern Present (5/23/2023)    [employer] [place] [employer] - Occupational Stress Questionnaire     Feeling of Stress : Rather much   Social Connections: Moderately Isolated (10/3/2023)    Social Connection and Isolation Panel [NHANES]     Frequency of Communication with Friends and Family: More than three times a week     Frequency of Social Gatherings with Friends and Family: Patient refused     Attends Yarsanism Services: More than 4 times per year     Active Member of Clubs or Organizations: No     Attends Club or Organization Meetings: Never     Marital Status: Never    Housing Stability: Low Risk  (10/3/2023)    Housing Stability Vital Sign     Unable to Pay for Housing in the Last Year: No     Number of Places Lived in the Last Year: 1     Unstable  "Housing in the Last Year: No       Review of Systems   Constitutional: Negative for chills and fever.   Cardiovascular:  Positive for leg swelling. Negative for claudication.   Respiratory:  Negative for cough and shortness of breath.    Skin:  Positive for dry skin, nail changes and poor wound healing. Negative for itching and rash.   Musculoskeletal:  Positive for arthritis, joint pain, myalgias and stiffness. Negative for falls, joint swelling and muscle weakness.   Gastrointestinal:  Negative for diarrhea, nausea and vomiting.   Neurological:  Positive for numbness and paresthesias. Negative for tremors and weakness.   Psychiatric/Behavioral:  Negative for altered mental status and hallucinations.            Objective:      Vitals:    23 0958   BP: 127/66   Pulse: 99   Height: 5' 9" (1.753 m)   PainSc:   2   PainLoc: Foot       Physical Exam  Vitals reviewed.   Constitutional:       Appearance: He is well-developed.   HENT:      Head: Normocephalic.   Cardiovascular:      Comments: Dorsalis pedis and posterior tibial pulses are diminished RLE. Toes are cool to touch. Feet are warm proximally.There is decreased digital hair. Skin is atrophic, slightly hyperpigmented, and mildly edematous      Pulmonary:      Effort: No respiratory distress.   Musculoskeletal:      Right lower le+ Edema present.      Left lower le+ Edema present.      Comments: L foot modified chopart's amputation.      Skin:     General: Skin is warm and dry.      Coloration: Skin is not jaundiced or pale.      Findings: Wound (see below) present. No bruising.      Comments: Overall thickening of skin of b/l legs and ankles    Mild hyperpigmentation to skin of both ankles and/or feet, consistent with hemosiderin deposits.    Neurological:      Mental Status: He is alert and oriented to person, place, and time.      Sensory: Sensory deficit present.      Comments: Light touch, proprioception, and sharp/dull sensation are all intact " bilaterally. Protective threshold with the [place]-Wienstein monofilament is diminished bilaterally.    Psychiatric:         Behavior: Behavior normal.         Thought Content: Thought content normal.         Judgment: Judgment normal.         12/04/2023    Right                  Left              Narrative & Impression  EXAMINATION:  US LOWER EXTREMITY ARTERIES BILATERAL     CLINICAL HISTORY:  PAD, nonhealing ulcer of RLE;     TECHNIQUE:  Bilateral lower extremity arterial duplex ultrasound examination performed. Multiple gray scale and color doppler images were obtained in addition to waveform analysis.     COMPARISON:  Lower extremity arterial ultrasound 03/10/2023, CTA runoff 06/16/2022     FINDINGS:  The peak systolic velocities on the right are as follows, in centimeters/second:     Common femoral artery: 140 with triphasic waveform     Deep femoral artery: 64 with triphasic waveform     Superficial femoral artery, proximal: 124 triphasic waveform     Superficial femoral artery, mid portion: 130 with phasic waveform     Superficial femoral artery, distal: 119 with triphasic waveform     Proximal popliteal artery: 72 which phasic waveform     Distal popliteal artery: 130 with triphasic from     Anterior tibial artery: 163 with biphasic waveform     Posterior tibial artery: 33 with monophasic waveform diminutive in caliber and difficult to visualize noting severe multifocal atherosclerotic disease on prior CTA runoff.     The peak systolic velocities on the left are as follows, in centimeters/second:     Common femoral artery: 132 with triphasic waveform     Deep femoral artery: 236 with triphasic waveform     Superficial femoral artery, proximal: 115 with triphasic waveform     Superficial femoral artery, mid portion: 113 with triphasic waveform     Superficial femoral artery, distal: 106 with triphasic waveform     Proximal popliteal artery: 115 with triphasic waveform     Distal popliteal artery: 85 with  triphasic waveform     Anterior tibial artery: 80 with triphasic waveform     Posterior tibial artery: 88 with monophasic waveform     Prominent to mildly enlarged bilateral inguinal nodes.     Impression:     Velocity doubling in the right anterior tibial artery which estimates a moderate degree of stenosis in this vessel.     Diminutive right posterior tibial artery which is difficult to visualize likely reflecting areas of severe narrowing noting severe multifocal atherosclerotic disease visualized on prior CTA runoff.     Electronically signed by resident: Fatmata Rm  Date:                                            10/07/2023  Time:                                           08:27     Electronically signed by: Sukumar Krause MD  Date:                                            10/07/2023  Time:                                           10:46       Assessment:       Encounter Diagnoses   Name Primary?    Pressure injury of left heel, stage 4 Yes    Chronic osteomyelitis involving ankle and foot, left     Bullae     Pressure injury of right heel, unstageable     Foot ulcer, left, with fat layer exposed     Toe ulcer, right, with fat layer exposed     Type II diabetes mellitus with neurological manifestations     Type II diabetes mellitus with peripheral circulatory disorder          Plan:     Problem List Items Addressed This Visit       Pressure injury of left heel, stage 4 - Primary    Relevant Orders    SUBSEQUENT HOME HEALTH ORDERS     Other Visit Diagnoses       Chronic osteomyelitis involving ankle and foot, left        Bullae        Pressure injury of right heel, unstageable        Relevant Orders    SUBSEQUENT HOME HEALTH ORDERS    Foot ulcer, left, with fat layer exposed        Relevant Orders    SUBSEQUENT HOME HEALTH ORDERS    Toe ulcer, right, with fat layer exposed        Relevant Orders    SUBSEQUENT HOME HEALTH ORDERS    Type II diabetes mellitus with neurological manifestations        Type II  diabetes mellitus with peripheral circulatory disorder               I counseled the patient on his conditions, their implications and medical management.    Education about the diabetic foot, neuropathy, and prevention of limb loss.    Discussed wound healing cycle, skin integrity, ways to care for skin.Counseled patient on the effects of biomechanical pressure, peripheral arterial occlusive disease, high blood glucose on healing. He verbalizes understanding that it can increase the chances of delayed healing and this prolonged exposure leads to infection or progression of infection which subsequently can result in loss of limb.    Adequate vitamin supplementation, protein intake, and hydration - discussed with patient    The wound is cleansed of foreign material as much as possible and the base inspected for bone or abscess.  No palpable bone.  Vashe soak to foot wounds bilaterally.     Dressings:  Iodosorb to the toes of the right foot and Hydrofera blue to all other lesions of the lower extremity bilaterally followed by bilateral calamine coflex footballs all which were well tolerated and without adverse effect  Offloading:  [place] shoe dispensed to the right lower extremity    Subsequent home health orders placed.    Antibiosis per Infectious Disease    Follow-up:  [facility] or with his usual wound care provider.  but should call [facility] immediately if any signs of infection, such as fever, chills, sweats, increased redness or pain.    Short-term goals include maintaining good offloading and minimizing bioburden, promoting granulation and epithelialization to healing.  Long-term goals include keeping the wound healed by good offloading and medical management under the direction of internist.    Diabetic Foot Education. Patient reminded of the importance of good nutrition and blood sugar control to help prevent podiatric complications of diabetes. Patient instructed on proper foot hygeine.  We discussed wearing proper shoe gear, daily foot inspections, never walking without protective shoe gear, never putting sharp instruments to feet.        I spent a total of 58 minutes on the day of the visit.This includes face to face time and non-face to face time preparing to see the patient (eg, review of tests), obtaining and/or reviewing separately obtained history, documenting clinical information in the electronic or other health record, independently interpreting results and communicating results to the patient/family/caregiver, or care coordinator.    Procedures

## 2023-12-06 RX ORDER — LEVOFLOXACIN 750 MG/1
750 TABLET ORAL DAILY
Qty: 14 TABLET | Refills: 0 | Status: SHIPPED | OUTPATIENT
Start: 2023-12-06 | End: 2023-12-20

## 2023-12-06 RX ORDER — MINOCYCLINE HYDROCHLORIDE 100 MG/1
100 CAPSULE ORAL EVERY 12 HOURS
Qty: 60 CAPSULE | Refills: 0 | Status: SHIPPED | OUTPATIENT
Start: 2023-12-06 | End: 2024-01-05

## 2023-12-15 ENCOUNTER — OFFICE VISIT (OUTPATIENT)
Dept: INFECTIOUS DISEASES | Facility: CLINIC | Age: 73
End: 2023-12-15
Payer: MEDICARE

## 2023-12-15 ENCOUNTER — LAB VISIT (OUTPATIENT)
Dept: LAB | Facility: HOSPITAL | Age: 73
End: 2023-12-15
Payer: MEDICARE

## 2023-12-15 VITALS
TEMPERATURE: 98 F | BODY MASS INDEX: 39.1 KG/M2 | HEIGHT: 69 IN | SYSTOLIC BLOOD PRESSURE: 162 MMHG | WEIGHT: 264 LBS | DIASTOLIC BLOOD PRESSURE: 80 MMHG | HEART RATE: 62 BPM

## 2023-12-15 DIAGNOSIS — L97.909 CHRONIC ULCER OF LOWER EXTREMITY, UNSPECIFIED LATERALITY, UNSPECIFIED ULCER STAGE: ICD-10-CM

## 2023-12-15 DIAGNOSIS — Z79.899 MEDICATION MANAGEMENT: ICD-10-CM

## 2023-12-15 DIAGNOSIS — R23.8 BULLAE: ICD-10-CM

## 2023-12-15 DIAGNOSIS — L97.909 CHRONIC ULCER OF LOWER EXTREMITY, UNSPECIFIED LATERALITY, UNSPECIFIED ULCER STAGE: Primary | ICD-10-CM

## 2023-12-15 LAB
ALBUMIN SERPL BCP-MCNC: 2.6 G/DL (ref 3.5–5.2)
ALP SERPL-CCNC: 83 U/L (ref 55–135)
ALT SERPL W/O P-5'-P-CCNC: 12 U/L (ref 10–44)
ANION GAP SERPL CALC-SCNC: 8 MMOL/L (ref 8–16)
AST SERPL-CCNC: 17 U/L (ref 10–40)
BASOPHILS # BLD AUTO: 0.02 K/UL (ref 0–0.2)
BASOPHILS NFR BLD: 0.3 % (ref 0–1.9)
BILIRUB SERPL-MCNC: 0.2 MG/DL (ref 0.1–1)
BUN SERPL-MCNC: 32 MG/DL (ref 8–23)
CALCIUM SERPL-MCNC: 9 MG/DL (ref 8.7–10.5)
CHLORIDE SERPL-SCNC: 110 MMOL/L (ref 95–110)
CO2 SERPL-SCNC: 23 MMOL/L (ref 23–29)
CREAT SERPL-MCNC: 1.5 MG/DL (ref 0.5–1.4)
CRP SERPL-MCNC: 9.3 MG/L (ref 0–8.2)
DIFFERENTIAL METHOD: ABNORMAL
EOSINOPHIL # BLD AUTO: 0.3 K/UL (ref 0–0.5)
EOSINOPHIL NFR BLD: 3.6 % (ref 0–8)
ERYTHROCYTE [DISTWIDTH] IN BLOOD BY AUTOMATED COUNT: 17.8 % (ref 11.5–14.5)
EST. GFR  (NO RACE VARIABLE): 48.6 ML/MIN/1.73 M^2
GLUCOSE SERPL-MCNC: 159 MG/DL (ref 70–110)
HCT VFR BLD AUTO: 25.9 % (ref 40–54)
HGB BLD-MCNC: 8.2 G/DL (ref 14–18)
IMM GRANULOCYTES # BLD AUTO: 0.02 K/UL (ref 0–0.04)
IMM GRANULOCYTES NFR BLD AUTO: 0.3 % (ref 0–0.5)
LYMPHOCYTES # BLD AUTO: 1.1 K/UL (ref 1–4.8)
LYMPHOCYTES NFR BLD: 14.9 % (ref 18–48)
MCH RBC QN AUTO: 26.9 PG (ref 27–31)
MCHC RBC AUTO-ENTMCNC: 31.7 G/DL (ref 32–36)
MCV RBC AUTO: 85 FL (ref 82–98)
MONOCYTES # BLD AUTO: 0.6 K/UL (ref 0.3–1)
MONOCYTES NFR BLD: 8.6 % (ref 4–15)
NEUTROPHILS # BLD AUTO: 5.3 K/UL (ref 1.8–7.7)
NEUTROPHILS NFR BLD: 72.3 % (ref 38–73)
NRBC BLD-RTO: 0 /100 WBC
PLATELET # BLD AUTO: 247 K/UL (ref 150–450)
PMV BLD AUTO: 11.4 FL (ref 9.2–12.9)
POTASSIUM SERPL-SCNC: 4.5 MMOL/L (ref 3.5–5.1)
PROT SERPL-MCNC: 7.4 G/DL (ref 6–8.4)
RBC # BLD AUTO: 3.05 M/UL (ref 4.6–6.2)
SODIUM SERPL-SCNC: 141 MMOL/L (ref 136–145)
WBC # BLD AUTO: 7.3 K/UL (ref 3.9–12.7)

## 2023-12-15 PROCEDURE — 3066F NEPHROPATHY DOC TX: CPT | Mod: CPTII,S$GLB,, | Performed by: REGISTERED NURSE

## 2023-12-15 PROCEDURE — 3046F HEMOGLOBIN A1C LEVEL >9.0%: CPT | Mod: CPTII,S$GLB,, | Performed by: REGISTERED NURSE

## 2023-12-15 PROCEDURE — 3060F PR POS MICROALBUMINURIA RESULT DOCUMENTED/REVIEW: ICD-10-PCS | Mod: CPTII,S$GLB,, | Performed by: REGISTERED NURSE

## 2023-12-15 PROCEDURE — 99214 OFFICE O/P EST MOD 30 MIN: CPT | Mod: S$GLB,,, | Performed by: REGISTERED NURSE

## 2023-12-15 PROCEDURE — 1126F AMNT PAIN NOTED NONE PRSNT: CPT | Mod: CPTII,S$GLB,, | Performed by: REGISTERED NURSE

## 2023-12-15 PROCEDURE — 80053 COMPREHEN METABOLIC PANEL: CPT | Performed by: REGISTERED NURSE

## 2023-12-15 PROCEDURE — 3079F PR MOST RECENT DIASTOLIC BLOOD PRESSURE 80-89 MM HG: ICD-10-PCS | Mod: CPTII,S$GLB,, | Performed by: REGISTERED NURSE

## 2023-12-15 PROCEDURE — 86140 C-REACTIVE PROTEIN: CPT | Performed by: REGISTERED NURSE

## 2023-12-15 PROCEDURE — 3079F DIAST BP 80-89 MM HG: CPT | Mod: CPTII,S$GLB,, | Performed by: REGISTERED NURSE

## 2023-12-15 PROCEDURE — 99214 PR OFFICE/OUTPT VISIT, EST, LEVL IV, 30-39 MIN: ICD-10-PCS | Mod: S$GLB,,, | Performed by: REGISTERED NURSE

## 2023-12-15 PROCEDURE — 3008F PR BODY MASS INDEX (BMI) DOCUMENTED: ICD-10-PCS | Mod: CPTII,S$GLB,, | Performed by: REGISTERED NURSE

## 2023-12-15 PROCEDURE — 3066F PR DOCUMENTATION OF TREATMENT FOR NEPHROPATHY: ICD-10-PCS | Mod: CPTII,S$GLB,, | Performed by: REGISTERED NURSE

## 2023-12-15 PROCEDURE — 3046F PR MOST RECENT HEMOGLOBIN A1C LEVEL > 9.0%: ICD-10-PCS | Mod: CPTII,S$GLB,, | Performed by: REGISTERED NURSE

## 2023-12-15 PROCEDURE — 99999 PR PBB SHADOW E&M-EST. PATIENT-LVL V: CPT | Mod: PBBFAC,,, | Performed by: REGISTERED NURSE

## 2023-12-15 PROCEDURE — 1159F PR MEDICATION LIST DOCUMENTED IN MEDICAL RECORD: ICD-10-PCS | Mod: CPTII,S$GLB,, | Performed by: REGISTERED NURSE

## 2023-12-15 PROCEDURE — 3060F POS MICROALBUMINURIA REV: CPT | Mod: CPTII,S$GLB,, | Performed by: REGISTERED NURSE

## 2023-12-15 PROCEDURE — 1126F PR PAIN SEVERITY QUANTIFIED, NO PAIN PRESENT: ICD-10-PCS | Mod: CPTII,S$GLB,, | Performed by: REGISTERED NURSE

## 2023-12-15 PROCEDURE — 3008F BODY MASS INDEX DOCD: CPT | Mod: CPTII,S$GLB,, | Performed by: REGISTERED NURSE

## 2023-12-15 PROCEDURE — 99999 PR PBB SHADOW E&M-EST. PATIENT-LVL V: ICD-10-PCS | Mod: PBBFAC,,, | Performed by: REGISTERED NURSE

## 2023-12-15 PROCEDURE — 85025 COMPLETE CBC W/AUTO DIFF WBC: CPT | Performed by: REGISTERED NURSE

## 2023-12-15 PROCEDURE — 3077F PR MOST RECENT SYSTOLIC BLOOD PRESSURE >= 140 MM HG: ICD-10-PCS | Mod: CPTII,S$GLB,, | Performed by: REGISTERED NURSE

## 2023-12-15 PROCEDURE — 36415 COLL VENOUS BLD VENIPUNCTURE: CPT | Performed by: REGISTERED NURSE

## 2023-12-15 PROCEDURE — 1159F MED LIST DOCD IN RCRD: CPT | Mod: CPTII,S$GLB,, | Performed by: REGISTERED NURSE

## 2023-12-15 PROCEDURE — 3077F SYST BP >= 140 MM HG: CPT | Mod: CPTII,S$GLB,, | Performed by: REGISTERED NURSE

## 2023-12-15 NOTE — PROGRESS NOTES
Subjective     Patient ID: Saji Castañeda is a 73 y.o. male.    Chief Complaint: Follow-up    HPI    Mr. Castañeda is a 74 yo male with PMH HTN, PAD, afib, T2DM, PVD s/p left foot amputation and chronic OM of left heel who was seen inpatient recently with weakness who was found to have ESBL Klebsiella and  acinetobacter bacteremia, source was suspected PICC line vs heel wound. Pt is with chronic wounds to right heel/foot and left leg/stump. following with wound care with [facility] who recommended ED visit in early September which pt declined. Ended up presenting to  ED on 9/13 with cf sepsis 2/2 bilateral BLE wounds. Pt not amendable for amputation per previous notes. Was discharged with 6 weeks of linezolid, cefepime, and flagyl x6 weeks for osteo of left stump, EOC ~10/25/23, which he was remained on upon admission on 10/02/23. Pt was followed while inpatient per ID for bacteremia and osteo. Was started on cefidericol IV on 10/5/23 to cover  acinetobacter bacteremia, as well as kleb bacteremia. It was planned for patient to continue antibiotics until ~10/25/23 to cover bacteremia, as well as left calcaneal osteo (wound culture + c. Albicans and achromobacter). To note, pt also is s/p pacemaker placement on 10/12, which pt tolerated procedure well. Pt was recently seen by me on 10/24/23 for end of care. Pt was residing at [facility]. He reported compliance with antibiotics. Reported frequent wound care. Reported new blisters to left leg. Denied leg elevation. Denied foul odor, purulence, fevers, chills. It was decided during our 10/24/23 visit to discontinue antibiotics following doses on 10/25. Pt was without signs/symptoms of active infection and therefore it was decided to stop therapy. Pt was consoled on risk of recurrence given the bullae and open wounds on his legs/foot. It was recommended for the pt to continue wound care and to establish with podiatry.      Pt returned for follow up on  23, and was found to have drainage, as well as foul odor to left leg/stump. Right foot/heel wounds appeared stable without infectious concerns. Continued to get wound care twice weekly. Stated he was unable to change dressings his self. Denied fever, or systemic signs of infection. Was started on minocycline and Levaquin for suspected infection. States he is discharged from [facility] and is at home. Denies fever, body aches chills.      Today, presents for follow up. Continues on minocycline and levaquin. States compliance. Denies fever, body aches, chills. Denies NVD. Reports wound care 2/weekly. Has appt scheduled with podiatry on Monday. Remains with drainage and foul odor to left leg. Denies concerns for right foot. Continues without support at home to assist in wound care.        12/15/23    Pt returns today for follow up. Reports complaince with medication. States wound care visits have increased to 3 times a week. Denies foul odor, but reports drainage. Denies fever, chills. Denies NVD.           See photos of wound progression below:              10/24/23     Right heel     Right ankle     Left stump     Left stump              23 visit:      Right heel wound    Right foot wound:    Left stump wound:      Left le/1/23 visit:     Right ankle:    Right heel    Left leg: posterior     Left stump:    Left le/15/23:                                     Review of Systems   Constitutional:  Negative for chills, diaphoresis, fatigue and fever.   HENT: Negative.     Eyes: Negative.    Respiratory:  Negative for cough and shortness of breath.    Cardiovascular:  Positive for leg swelling.   Gastrointestinal:  Negative for diarrhea and vomiting.   Genitourinary:  Negative for dysuria.   Musculoskeletal:  Positive for gait problem. Negative for leg pain.   Neurological:  Negative for dizziness and weakness.   Psychiatric/Behavioral:  Negative for agitation.           Objective      Physical Exam  Vitals reviewed.   Constitutional:       Appearance: Normal appearance.   HENT:      Head: Normocephalic.      Nose: Nose normal.      Mouth/Throat:      Mouth: Mucous membranes are moist.      Pharynx: Oropharynx is clear.   Eyes:      General:         Right eye: No discharge.         Left eye: No discharge.      Conjunctiva/sclera: Conjunctivae normal.   Pulmonary:      Effort: Pulmonary effort is normal. No respiratory distress.      Breath sounds: No stridor.   Abdominal:      General: Abdomen is flat. There is no distension.      Palpations: Abdomen is soft.   Musculoskeletal:         General: Swelling and signs of injury present.      Cervical back: Normal range of motion.      Right lower leg: Edema present.      Left lower leg: Edema present.   Skin:     Findings: Lesion present. No erythema.   Neurological:      General: No focal deficit present.      Mental Status: He is alert and oriented to person, place, and time.      Motor: No weakness.      Gait: Gait abnormal.   Psychiatric:         Mood and Affect: Mood normal.         Behavior: Behavior normal.            Assessment and Plan     1. Chronic ulcer of lower extremity, unspecified laterality, unspecified ulcer stage  -     CBC Auto Differential; Future; Expected date: 12/15/2023  -     Comprehensive Metabolic Panel; Future; Expected date: 12/15/2023  -     C-reactive protein; Future; Expected date: 12/15/2023    2. Bullae  -     CBC Auto Differential; Future; Expected date: 12/15/2023  -     Comprehensive Metabolic Panel; Future; Expected date: 12/15/2023  -     C-reactive protein; Future; Expected date: 12/15/2023        - discussed at length that he is at increased risk for reinfection and antibiotics alone will not heal his wounds. Discussed that frequent wound care, leg elevation, edema control, and glucose control is imperative to assist with wound healing.   - encouraged pt to follow up with podiatry and continue wound care.    - discussed risks of ongoing, worsening wounds, which include systemic infection  - will continue antibiotics x2 weeks, as there seems to be progress in wounds, and would like to decrease risk of reinfection. Will repeat labs today. Will follow up in about a month, or as needed.   - will repeat EKG as pt remains on prolong course of FQ          30 minutes of total time was spent on this encounter, which includes face to face time and non-face to face time preparing to see the patient (eg, review of tests), Obtaining and/or reviewing separately obtained history, documenting clinical information in the electronic or other health record, independently interpreting results (not separately reported) and communicating results to the patient/family/caregiver, or care coordination (not separately reported).

## 2023-12-19 ENCOUNTER — TELEPHONE (OUTPATIENT)
Dept: INFECTIOUS DISEASES | Facility: CLINIC | Age: 73
End: 2023-12-19
Payer: MEDICARE

## 2023-12-19 NOTE — TELEPHONE ENCOUNTER
----- Message from Yudi Edward MA sent at 12/19/2023  3:53 PM CST -----    ----- Message -----  From: Dolly Amaya NP  Sent: 12/19/2023   3:52 PM CST  To: Hoa Abreu MA; Yudi Edward MA    Hi! Can you schedule for EKG? Orders are in. (Is radha still in training? Unsure who to message?)

## 2023-12-20 NOTE — PROGRESS NOTES
Subjective:      Patient ID: Saji Castañeda is a 72 y.o. male.    Chief Complaint: Wound Care and Dressing Change    Saji is a 72 y.o. male who presents to the clinic for evaluation and treatment of high risk feet. Saji has a past medical history of Arthritis, Diabetes mellitus, Diabetes mellitus, type 2, Hyperlipidemia, and Osteomyelitis. The patient's chief complaint is diabetic foot ulcer, L heel and leg . This patient has documented high risk feet requiring routine maintenance secondary to peripheral neuropathy.  No issues w/ abx       PCP: Serge Holland MD    Date Last Seen by PCP: Patient does not have a PCP or has not yet seen their PCP     Chief Complaint   Patient presents with    Wound Care    Dressing Change     History of Trauma: negative  Sign of Infection: none    Hemoglobin A1C   Date Value Ref Range Status   12/08/2020 7.9 (H) 4.0 - 5.6 % Final     Comment:     ADA Screening Guidelines:  5.7-6.4%  Consistent with prediabetes  >or=6.5%  Consistent with diabetes  High levels of fetal hemoglobin interfere with the HbA1C  assay. Heterozygous hemoglobin variants (HbS, HgC, etc)do  not significantly interfere with this assay.   However, presence of multiple variants may affect accuracy.     12/05/2019 9.8 (H) 4.0 - 5.6 % Final     Comment:     ADA Screening Guidelines:  5.7-6.4%  Consistent with prediabetes  >or=6.5%  Consistent with diabetes  High levels of fetal hemoglobin interfere with the HbA1C  assay. Heterozygous hemoglobin variants (HbS, HgC, etc)do  not significantly interfere with this assay.   However, presence of multiple variants may affect accuracy.     10/15/2018 7.7 (H) 4.0 - 5.6 % Final     Comment:     ADA Screening Guidelines:  5.7-6.4%  Consistent with prediabetes  >or=6.5%  Consistent with diabetes  High levels of fetal hemoglobin interfere with the HbA1C  assay. Heterozygous hemoglobin variants (HbS, HgC, etc)do  not significantly interfere with this assay.  Detail Level: Generalized "  However, presence of multiple variants may affect accuracy.         Review of Systems   Constitutional: Negative for chills and fever.   Cardiovascular: Positive for leg swelling. Negative for chest pain and claudication.   Respiratory: Negative for cough and shortness of breath.    Skin: Positive for color change, dry skin, nail changes (R foot only. L foot amputation) and poor wound healing (L leg and heel ulcers). Negative for flushing, itching and rash.   Musculoskeletal: Negative for back pain, falls, gout and joint pain.        L foot amputation (Choparts)    Gastrointestinal: Negative for nausea and vomiting.   Neurological: Positive for numbness and sensory change. Negative for paresthesias.   Psychiatric/Behavioral: Negative for altered mental status.           Objective:       Vitals:    22 1405   Resp: 18   Weight: 124.3 kg (274 lb)   Height: 5' 9" (1.753 m)   PainSc: 0-No pain        Physical Exam  Vitals reviewed.   Constitutional:       Appearance: He is well-developed.   HENT:      Head: Normocephalic.   Cardiovascular:      Comments: DP/PT pulses diminished b/l CRT < 3 sec to tips of toes.    Pulmonary:      Effort: No respiratory distress.   Musculoskeletal:      Right lower le+ Edema present.      Left lower le+ Edema present.      Comments: L foot modified chopart's amputation.      Skin:     General: Skin is warm and dry.      Coloration: Skin is not jaundiced or pale.      Findings: No bruising or erythema.      Comments: Overall thickening of skin of b/l legs and ankles    Mild hyperpigmentation to skin of both ankles and/or feet, consistent with hemosiderin deposits.     Diminished pedal hair b/l.     Ulcer L heel: 4.3x3.5x0.3  Depth: bone  Periphery: hyperkeratotic rim   Base: granular with exposed bone  Drainage: Serosanguinous  Ulcer Posterior L mid leg: 0.4 x 0.3x 0.3cm  Depth: subcutaneous tissue layer  Periphery: hyperkeratotic rim  Base: granular with biofilm  Drainage: " Serosanguinous  SOI: none   Neurological:      Mental Status: He is alert and oriented to person, place, and time.      Sensory: Sensory deficit present.      Comments: Light touch, proprioception, and sharp/dull sensation are all intact bilaterally. Protective threshold with the [place]-Wienstein monofilament is diminished bilaterally.    Psychiatric:         Behavior: Behavior normal.         Thought Content: Thought content normal.         Judgment: Judgment normal.               Assessment:       Encounter Diagnoses   Name Primary?    Type II diabetes mellitus with neurological manifestations Yes    Skin ulcer of left calf with fat layer exposed     PVD (peripheral vascular disease)     Heel ulcer, left, with necrosis of bone              Plan:       Saji was seen today for wound care and dressing change.    Diagnoses and all orders for this visit:    Type II diabetes mellitus with neurological manifestations    Skin ulcer of left calf with fat layer exposed    PVD (peripheral vascular disease)  -     VAS US Ankle Brachial Indices Resting; Future    Heel ulcer, left, with necrosis of bone      I counseled the patient on his conditions, their implications and medical management.     heel and leg ulcers assessed and examined.     Nonviable tissues were debrided beyond the level of  bone utilizing a  sterile No. 15 scalpel and forceps. Minimal bleeding controlled with direct pressure  The patient tolerated this well.     Applied HB to all wounds,     With patient's permission, I applied an Unna boot dressing (bi-layered pink Coflex brand)  using a spiral technique beginning with the foam layer followed by the cohesive bandage avoiding creases or folds.  The wrap was started behind the first metatarsal and ended below the tibial tubercle of the knee.  There was overlap of each turn half the width of the previous turn in order to better control edema. Patient tolerated well and related comfort to the LLE lower  extremity .     I discussed with the  patient  signs and symptoms of infection including redness, drainage, purulence, odor, pain, elevated BS, streaking, fever, chills, etc . Patient is   to seek medical attention (ER or urgent care) if these symptoms occur      RTC 7-10d weeks, sooner PRN      Detail Level: Detailed

## 2023-12-27 ENCOUNTER — TELEPHONE (OUTPATIENT)
Dept: INFECTIOUS DISEASES | Facility: CLINIC | Age: 73
End: 2023-12-27
Payer: MEDICARE

## 2023-12-27 ENCOUNTER — HOSPITAL ENCOUNTER (OUTPATIENT)
Dept: CARDIOLOGY | Facility: CLINIC | Age: 73
Discharge: HOME | End: 2023-12-27
Payer: MEDICARE

## 2023-12-27 DIAGNOSIS — Z79.899 MEDICATION MANAGEMENT: ICD-10-CM

## 2023-12-27 PROCEDURE — 93000 EKG 12-LEAD: ICD-10-PCS | Mod: S$GLB,,, | Performed by: INTERNAL MEDICINE

## 2023-12-27 PROCEDURE — 93000 ELECTROCARDIOGRAM COMPLETE: CPT | Mod: S$GLB,,, | Performed by: INTERNAL MEDICINE

## 2023-12-27 NOTE — TELEPHONE ENCOUNTER
[facility]   EKG today - paced rhythm.  QTc prolonged.  Unclear if product of ventricular pacing vs true Qtc prolongation.  Patient has been on levaquin.    Will stop levaquin.  Continue minocycline.   Attempted to call patient at two numbers to advised to stop levaquin if still taking.   No answer. No voicemail.

## 2023-12-28 NOTE — TELEPHONE ENCOUNTER
Was able to reach patient this morning.   He isn't sure if he is still taking levofloxacin.  Advised that if he is, he is to stop this and continue the minocycline.   He verbally repeated my instruction to stop the levaquin  Reports he is feeling fine.  No complaints.  No fevers, chills, palpitations, chest pain, SOB   Reminding of follow up ID appt 1/16.   Encouraged to call with questions/concerns.

## 2024-01-06 PROCEDURE — G0179 MD RECERTIFICATION HHA PT: HCPCS | Mod: ,,, | Performed by: PODIATRIST

## 2024-01-08 PROBLEM — N18.9 ACUTE ON CHRONIC RENAL FAILURE: Status: RESOLVED | Noted: 2018-10-15 | Resolved: 2024-01-08

## 2024-01-08 PROBLEM — N17.9 ACUTE ON CHRONIC RENAL FAILURE: Status: RESOLVED | Noted: 2018-10-15 | Resolved: 2024-01-08

## 2024-01-10 DIAGNOSIS — I49.8 OTHER CARDIAC ARRHYTHMIA: Primary | ICD-10-CM

## 2024-01-16 ENCOUNTER — TELEPHONE (OUTPATIENT)
Dept: INFECTIOUS DISEASES | Facility: HOSPITAL | Age: 74
End: 2024-01-16
Payer: MEDICARE

## 2024-01-16 NOTE — TELEPHONE ENCOUNTER
Call placed to pt to check in as severe weather today and pt was scheduled for follow up. Pt requesting to be seen on Monday with podiatry. States he has wound care visit scheduled today. Agreeable to come in tomorrow if wound care is unable to go out today. Denies fever, body aches, chills. Denies NVD. Reports drainage from legs. Unable to perform wound care on his own and does not have support. States he is taking minocycline. Will follow up tomorrow.

## 2024-01-17 ENCOUNTER — OFFICE VISIT (OUTPATIENT)
Dept: INFECTIOUS DISEASES | Facility: CLINIC | Age: 74
End: 2024-01-17
Payer: MEDICARE

## 2024-01-17 VITALS
SYSTOLIC BLOOD PRESSURE: 159 MMHG | HEART RATE: 74 BPM | WEIGHT: 263.88 LBS | DIASTOLIC BLOOD PRESSURE: 64 MMHG | BODY MASS INDEX: 39.08 KG/M2 | TEMPERATURE: 99 F | HEIGHT: 69 IN

## 2024-01-17 DIAGNOSIS — L97.909 CHRONIC ULCER OF LOWER EXTREMITY, UNSPECIFIED LATERALITY, UNSPECIFIED ULCER STAGE: Primary | ICD-10-CM

## 2024-01-17 DIAGNOSIS — M86.672 CHRONIC OSTEOMYELITIS INVOLVING ANKLE AND FOOT, LEFT: ICD-10-CM

## 2024-01-17 DIAGNOSIS — Z79.899 MEDICATION MANAGEMENT: ICD-10-CM

## 2024-01-17 DIAGNOSIS — R23.8 BULLAE: ICD-10-CM

## 2024-01-17 DIAGNOSIS — L89.624 PRESSURE INJURY OF LEFT HEEL, STAGE 4: ICD-10-CM

## 2024-01-17 PROCEDURE — 1126F AMNT PAIN NOTED NONE PRSNT: CPT | Mod: CPTII,S$GLB,, | Performed by: REGISTERED NURSE

## 2024-01-17 PROCEDURE — 3078F DIAST BP <80 MM HG: CPT | Mod: CPTII,S$GLB,, | Performed by: REGISTERED NURSE

## 2024-01-17 PROCEDURE — 99215 OFFICE O/P EST HI 40 MIN: CPT | Mod: S$GLB,,, | Performed by: REGISTERED NURSE

## 2024-01-17 PROCEDURE — 3288F FALL RISK ASSESSMENT DOCD: CPT | Mod: CPTII,S$GLB,, | Performed by: REGISTERED NURSE

## 2024-01-17 PROCEDURE — 1159F MED LIST DOCD IN RCRD: CPT | Mod: CPTII,S$GLB,, | Performed by: REGISTERED NURSE

## 2024-01-17 PROCEDURE — 99999 PR PBB SHADOW E&M-EST. PATIENT-LVL III: CPT | Mod: PBBFAC,,, | Performed by: REGISTERED NURSE

## 2024-01-17 PROCEDURE — 3008F BODY MASS INDEX DOCD: CPT | Mod: CPTII,S$GLB,, | Performed by: REGISTERED NURSE

## 2024-01-17 PROCEDURE — 3077F SYST BP >= 140 MM HG: CPT | Mod: CPTII,S$GLB,, | Performed by: REGISTERED NURSE

## 2024-01-17 PROCEDURE — 1101F PT FALLS ASSESS-DOCD LE1/YR: CPT | Mod: CPTII,S$GLB,, | Performed by: REGISTERED NURSE

## 2024-01-17 NOTE — PROGRESS NOTES
Subjective     Patient ID: Saji Castañeda is a 73 y.o. male.    Chief Complaint: Follow-up    Follow-up  Pertinent negatives include no chills, coughing, diaphoresis, fatigue, fever, nausea or vomiting.       Mr. Castañeda is a 74 yo male with PMH HTN, PAD, afib, T2DM, PVD s/p left foot amputation and chronic OM of left heel who was seen inpatient recently with weakness who was found to have ESBL Klebsiella and  acinetobacter bacteremia, source was suspected PICC line vs heel wound. Pt is with chronic wounds to right heel/foot and left leg/stump. following with wound care with [facility] who recommended ED visit in early September which pt declined. Ended up presenting to  ED on 9/13 with cf sepsis 2/2 bilateral BLE wounds. Pt not amendable for amputation per previous notes. Was discharged with 6 weeks of linezolid, cefepime, and flagyl x6 weeks for osteo of left stump, EOC ~10/25/23, which he was remained on upon admission on 10/02/23. Pt was followed while inpatient per ID for bacteremia and osteo. Was started on cefidericol IV on 10/5/23 to cover  acinetobacter bacteremia, as well as kleb bacteremia. It was planned for patient to continue antibiotics until ~10/25/23 to cover bacteremia, as well as left calcaneal osteo (wound culture + c. Albicans and achromobacter). To note, pt also is s/p pacemaker placement on 10/12, which pt tolerated procedure well. Pt was recently seen by me on 10/24/23 for end of care. Pt was residing at [facility]. He reported compliance with antibiotics. Reported frequent wound care. Reported new blisters to left leg. Denied leg elevation. Denied foul odor, purulence, fevers, chills. It was decided during our 10/24/23 visit to discontinue antibiotics following doses on 10/25. Pt was without signs/symptoms of active infection and therefore it was decided to stop therapy. Pt was consoled on risk of recurrence given the bullae and open wounds on his legs/foot. It was recommended  for the pt to continue wound care and to establish with podiatry.     11/17/23    Pt returned for follow up on 11/17/23, and was found to have drainage, as well as foul odor to left leg/stump. Right foot/heel wounds appeared stable without infectious concerns. Continued to get wound care twice weekly. Stated he was unable to change dressings his self. Denied fever, or systemic signs of infection. Was started on minocycline and Levaquin for suspected infection. States he is discharged from [facility] and is at home. Denies fever, body aches chills.     12/1/23    Today, presents for follow up. Continues on minocycline and levaquin. States compliance. Denies fever, body aches, chills. Denies NVD. Reports wound care 2/weekly. Has appt scheduled with podiatry on Monday. Remains with drainage and foul odor to left leg. Denies concerns for right foot. Continues without support at home to assist in wound care.         12/15/23     Pt returns today for follow up. Reports complaince with medication. States wound care visits have increased to 3 times a week. Denies foul odor, but reports drainage. Denies fever, chills. Denies NVD.       01/17/24    Presents today for follow up. Levaquin was stopped in December d/t prolonged qtc seen on EKG. Pt reports he was unsure what medications he was taken. Today states he hasn't had wound care in 1 week. States is unable to change dressings on own. Dressings are saturated and malodorous. No new blisters seen on bilateral legs, but wounds appear to be draining copiously. Pt denies fever, body aches, chills. Denies NVD. States he is unable to recall if he is still taking minocycline. Unable to state if he is able to care for himself. Denies desire to be readmitted to skilled nursing or nursing facility.     01/17/24                  Review of Systems   Constitutional:  Negative for chills, diaphoresis, fatigue and fever.   HENT: Negative.     Eyes: Negative.    Respiratory:  Negative  for cough and shortness of breath.    Cardiovascular:  Positive for leg swelling.   Gastrointestinal:  Negative for diarrhea, nausea and vomiting.   Genitourinary:  Negative for dysuria.   Musculoskeletal:  Positive for gait problem.   Psychiatric/Behavioral:  Negative for agitation and confusion.           Objective     Physical Exam  Vitals reviewed.   Constitutional:       General: He is not in acute distress.     Appearance: Normal appearance. He is not ill-appearing.   HENT:      Nose: Nose normal.      Mouth/Throat:      Mouth: Mucous membranes are moist.      Pharynx: Oropharynx is clear.   Eyes:      General:         Right eye: No discharge.         Left eye: No discharge.      Conjunctiva/sclera: Conjunctivae normal.   Cardiovascular:      Rate and Rhythm: Normal rate and regular rhythm.   Pulmonary:      Effort: Pulmonary effort is normal. No respiratory distress.   Abdominal:      General: Abdomen is flat. There is no distension.      Palpations: Abdomen is soft. There is no mass.   Musculoskeletal:         General: Swelling and deformity present.      Cervical back: Normal range of motion.      Right lower leg: Edema present.      Left lower leg: Edema present.   Skin:     Findings: Lesion present.   Neurological:      Mental Status: He is alert.      Motor: Weakness present.      Gait: Gait abnormal.   Psychiatric:         Mood and Affect: Mood normal.            Assessment and Plan     1. Chronic ulcer of lower extremity, unspecified laterality, unspecified ulcer stage    2. Bullae    3. Medication management    4. Chronic osteomyelitis involving ankle and foot, left    5. Pressure injury of left heel, stage 4        Pt with bilateral lower extremity edema and chronic wounds, as well as known posterior calcaneal osteo of left stump and recent bacteremia (treated with 6 weeks of cefidericol IV, dc 10/25). Was restarted on oral antibiotics (minocycline and levaquin) for concern for foul odor in 11/2023,  but pt is non complaint, therefore, unclear the benefit of continuing antibiotics at this time.     Concerned with pt ability to care for himself, but denying interest in long term care facility. States he lives with him brother and sister. Wounds are malodorous, but otherwise, appear stable from infectious stand point.     Plan:   - Will attempt to reschedule pt with [facility] (missed last appt scheduled).   - instructed pt to follow up with podiatry on Monday as scheduled   - will reach out to home health to discuss concerns/wound care schedule   - offered to repeat labs today but pt denied.   - instructed pt to call or seek immediate medical attention for any fevers, chills, sweats, diarrhea, n/v or increase in pain, swelling, drainage from wound.   - will follow up in about ~ 1-2 months or as needed            45 minutes of total time was spent on this encounter, which includes face to face time and non-face to face time preparing to see the patient (eg, review of tests), Obtaining and/or reviewing separately obtained history, documenting clinical information in the electronic or other health record, independently interpreting results (not separately reported) and communicating results to the patient/family/caregiver, or care coordination (not separately reported).

## 2024-01-22 ENCOUNTER — OFFICE VISIT (OUTPATIENT)
Dept: PODIATRY | Facility: CLINIC | Age: 74
End: 2024-01-22
Payer: MEDICARE

## 2024-01-22 DIAGNOSIS — I83.008 VENOUS STASIS ULCER OF LOWER LEG: ICD-10-CM

## 2024-01-22 DIAGNOSIS — L97.909 VENOUS STASIS ULCER OF LOWER LEG: ICD-10-CM

## 2024-01-22 DIAGNOSIS — L89.624 PRESSURE INJURY OF LEFT HEEL, STAGE 4: ICD-10-CM

## 2024-01-22 DIAGNOSIS — E11.49 TYPE II DIABETES MELLITUS WITH NEUROLOGICAL MANIFESTATIONS: Primary | ICD-10-CM

## 2024-01-22 DIAGNOSIS — L97.412 CHRONIC HEEL ULCER, RIGHT, WITH FAT LAYER EXPOSED: ICD-10-CM

## 2024-01-22 DIAGNOSIS — M86.672 CHRONIC OSTEOMYELITIS INVOLVING ANKLE AND FOOT, LEFT: ICD-10-CM

## 2024-01-22 DIAGNOSIS — E11.51 TYPE II DIABETES MELLITUS WITH PERIPHERAL CIRCULATORY DISORDER: ICD-10-CM

## 2024-01-22 PROCEDURE — 1160F RVW MEDS BY RX/DR IN RCRD: CPT | Mod: CPTII,S$GLB,, | Performed by: PODIATRIST

## 2024-01-22 PROCEDURE — 99999 PR PBB SHADOW E&M-EST. PATIENT-LVL II: CPT | Mod: PBBFAC,,, | Performed by: PODIATRIST

## 2024-01-22 PROCEDURE — 99215 OFFICE O/P EST HI 40 MIN: CPT | Mod: S$GLB,,, | Performed by: PODIATRIST

## 2024-01-22 PROCEDURE — 1159F MED LIST DOCD IN RCRD: CPT | Mod: CPTII,S$GLB,, | Performed by: PODIATRIST

## 2024-01-23 ENCOUNTER — TELEPHONE (OUTPATIENT)
Dept: PODIATRY | Facility: CLINIC | Age: 74
End: 2024-01-23
Payer: MEDICARE

## 2024-01-23 NOTE — PROGRESS NOTES
Subjective:      Patient ID: Saji Castañeda is a 73 y.o. male.    Chief Complaint: No chief complaint on file.    Saji Castañeda is a 73 y.o. male with  has a past medical history of Arthritis, Diabetes mellitus, Diabetes mellitus, type 2, Hyperlipidemia, Hypertension, Osteomyelitis, and Palliative care encounter. presents to the podiatry clinic for care of  multiple wounds to bilateral lower extremity.  Patient was last seen in Podiatry by my colleague Dr. White on 10/27/2023 he was then by Infectious Disease on both 11/17/2023 and 12/01/2023 and is currently on p.o. antibiosis.  Patient has home health wound care 2-3 times weekly.  He relates that he was having some excess drainage but it believes it may be improving.      Previously seen by my colleague, new to me.    1/22/2024 patient returns to clinic for bilateral LE wounds.  He never followed in wound clinic after last encounter and has not seen any wound care provider since our last encounter in Dec 2023.  He states he needs new wound care orders for HH.  Recently seen by infectious disease.    Shoe gear: darco shoe    No chief complaint on file.      Hemoglobin A1C   Date Value Ref Range Status   09/13/2023 11.0 (H) 4.5 - 5.7 % Final   03/10/2023 8.4 (H) 4.0 - 5.6 % Final     Comment:     ADA Screening Guidelines:  5.7-6.4%  Consistent with prediabetes  >or=6.5%  Consistent with diabetes    High levels of fetal hemoglobin interfere with the HbA1C  assay. Heterozygous hemoglobin variants (HbS, HgC, etc)do  not significantly interfere with this assay.   However, presence of multiple variants may affect accuracy.     09/17/2022 9.9 (H) 4.0 - 5.6 % Final     Comment:     ADA Screening Guidelines:  5.7-6.4%  Consistent with prediabetes  >or=6.5%  Consistent with diabetes    High levels of fetal hemoglobin interfere with the HbA1C  assay. Heterozygous hemoglobin variants (HbS, HgC, etc)do  not significantly interfere with this assay.   However, presence of  multiple variants may affect accuracy.     06/16/2022 9.5 (H) 4.0 - 5.6 % Final     Comment:     ADA Screening Guidelines:  5.7-6.4%  Consistent with prediabetes  >or=6.5%  Consistent with diabetes    High levels of fetal hemoglobin interfere with the HbA1C  assay. Heterozygous hemoglobin variants (HbS, HgC, etc)do  not significantly interfere with this assay.   However, presence of multiple variants may affect accuracy.               Patient Active Problem List   Diagnosis    Edema of leg    Tinea pedis    Wound, open, foot with complication    Diabetic neuropathy    Essential hypertension    Insulin dependent type 2 diabetes mellitus    Cataract cortical, senile    Anemia of chronic disease    Asymptomatic Mobitz (type) I (Wenckebach's) atrioventricular block    Peripheral arterial disease    PVD (peripheral vascular disease)    Cellulitis of left ankle    Osteomyelitis of left lower extremity    Pressure injury of left heel, stage 4    Morbid obesity    Iron deficiency anemia    Bacteremia due to Gram-negative bacteria    Leg swelling    Foot pain, left    Chronic deep vein thrombosis (DVT) of right upper extremity    Nonhealing ulcer of right lower leg with fat layer exposed    Hypergranulation    Subacute osteomyelitis of left foot    Osteomyelitis of right lower extremity    Chronic osteomyelitis    Hyperglycemia due to diabetes mellitus    AV block, 2nd degree    Paroxysmal atrial fibrillation    History of complete heart block    Blurred vision, left eye    Asymptomatic bacteriuria    Complete heart block       Current Outpatient Medications on File Prior to Visit   Medication Sig Dispense Refill    acetaminophen (TYLENOL) 325 MG tablet Take 650 mg by mouth every 6 (six) hours as needed for Temperature greater than.      acidophilus-pectin, citrus 100 million cell-10 mg Cap Take 1 capsule by mouth 2 (two) times a day.      apixaban (ELIQUIS) 5 mg Tab Take 1 tablet (5 mg total) by mouth 2 (two) times daily.  "60 tablet 0    ascorbic acid, vitamin C, (VITAMIN C) 500 MG tablet Take 500 mg by mouth 2 (two) times daily.      B COMPLEX-VITAMIN B12 tablet Take 1 tablet by mouth once daily.      BD AUTOSHIELD DUO PEN NEEDLE 30 gauge x 3/16" Ndle       BD ULTRA-FINE ADITYA PEN NEEDLE 32 gauge x 5/32" Ndle USE FOUR TIMES DAILY WITH MEALS AND NIGHTLY 100 each 0    blood sugar diagnostic (CONTOUR TEST STRIPS) Strp Inject 1 strip into the skin 2 (two) times daily before meals. 100 strip 6    clopidogreL (PLAVIX) 75 mg tablet Take 1 tablet (75 mg total) by mouth once daily. 60 tablet 0    diphenhydrAMINE (BENADRYL) 25 mg capsule No directions listed on the patients medication list.      furosemide (LASIX) 40 MG tablet Take 20 mg by mouth.      gabapentin (NEURONTIN) 300 MG capsule Take 300 mg by mouth 2 (two) times daily.      glipiZIDE (GLUCOTROL) 10 MG tablet Take 20 mg by mouth 2 (two) times a day.      hydrALAZINE (APRESOLINE) 100 MG tablet Take 1 tablet (100 mg total) by mouth every 8 (eight) hours. 90 tablet 11    insulin aspart U-100 (NOVOLOG) 100 unit/mL (3 mL) InPn pen Inject 6 Units into the skin 3 (three) times daily with meals. 30 mL 3    isosorbide dinitrate (ISORDIL) 10 MG tablet Take 1 tablet (10 mg total) by mouth 3 (three) times daily. 90 tablet 11    LANTUS SOLOSTAR U-100 INSULIN glargine 100 units/mL SubQ pen Inject 45 Units into the skin every evening. (Patient taking differently: Inject 45 Units into the skin once daily.)  0    miconazole NITRATE 2 % (MICOTIN) 2 % top powder Apply topically 2 (two) times daily. 20 g 0    MICROLET LANCET Misc   0    multivitamin (THERAGRAN) per tablet Take 1 tablet by mouth once daily.      NIFEdipine (PROCARDIA-XL) 60 MG (OSM) 24 hr tablet Take 1 tablet (60 mg total) by mouth once daily. 30 tablet 2    oxyCODONE 5 mg TbOr Take 1 tablet by mouth every 6 (six) hours as needed (Pain (Max 5 daily)).      pantoprazole (PROTONIX) 40 MG tablet Take 40 mg by mouth once daily. Before " "breakfast      pen needle, diabetic 32 gauge x 5/32" Ndle use as directed with insulin 100 each 2    rosuvastatin (CRESTOR) 40 MG Tab Take 40 mg by mouth once daily.      sodium hypochlorite 0.5 % (DAKIN'S SOLUTION) external solution Apply topically once daily.      tamsulosin (FLOMAX) 0.4 mg Cap Take 0.4 mg by mouth once daily.      triamcinolone acetonide 0.025% (KENALOG) 0.025 % Oint Apply topically 2 (two) times daily as needed (to affected area).      [DISCONTINUED] hydroCHLOROthiazide (HYDRODIURIL) 25 MG tablet Take 0.5 tablets (12.5 mg total) by mouth every Mon, Wed, Fri. Beginning on 7/4/2022       No current facility-administered medications on file prior to visit.       Review of patient's allergies indicates:  No Known Allergies    Past Surgical History:   Procedure Laterality Date    ANGIOGRAPHY OF LOWER EXTREMITY N/A 2/3/2021    Procedure: Angiogram Extremity Bilateral;  Surgeon: Ernst Chacko MD;  Location: [facility] OR [address] [place] [address];  Service: Peripheral Vascular;  Laterality: N/A;  7.4 mintues fluoroscopy time  816.15 mGy  170.17 Gycm2    AORTOGRAPHY WITH EXTREMITY RUNOFF Bilateral 2/3/2021    Procedure: AORTOGRAM, WITH EXTREMITY RUNOFF;  Surgeon: Ernst Chacko MD;  Location: [address];  Service: Peripheral Vascular;  Laterality: Bilateral;    DEBRIDEMENT OF FOOT Left 2/23/2021    Procedure: DEBRIDEMENT, LEFT HEEL;  Surgeon: Mayra Schroeder DPM;  Location: [address];  Service: Podiatry;  Laterality: Left;    FOOT AMPUTATION  October 2010    left high midfoot amputation    IMPLANTATION OF LEADLESS PACEMAKER N/A 10/12/2023    Procedure: [id number];  Surgeon: VANESSA Delatorre MD;  Location: [facility] EP LAB;  Service: Cardiology;  Laterality: N/A;  AVB, MICRA, EH, ANES, [place] [id number]       Family History   Problem Relation Age of Onset    Diabetes Mother     Heart disease Mother     Stroke Sister     Cancer Brother     Diabetes Sister        Social History "     Socioeconomic History    Marital status: Single   Tobacco Use    Smoking status: Never    Smokeless tobacco: Never   Substance and Sexual Activity    Alcohol use: No     Comment: occassional    Drug use: No    Sexual activity: Not Currently   Social History Narrative    Not currently working; lives with family     Social Determinants of Health     Financial Resource Strain: Low Risk  (10/3/2023)    Overall Financial Resource Strain (CARDIA)     Difficulty of Paying Living Expenses: Not hard at all   Food Insecurity: No Food Insecurity (10/3/2023)    Hunger Vital Sign     Worried About Running Out of Food in the Last Year: Never true     Ran Out of Food in the Last Year: Never true   Transportation Needs: No Transportation Needs (10/3/2023)    PRAPARE - Transportation     Lack of Transportation (Medical): No     Lack of Transportation (Non-Medical): No   Physical Activity: Patient Declined (10/3/2023)    Exercise Vital Sign     Days of Exercise per Week: Patient declined     Minutes of Exercise per Session: Patient declined   Stress: Stress Concern Present (5/23/2023)    [employer] [place] [employer] - Occupational Stress Questionnaire     Feeling of Stress : Rather much   Social Connections: Moderately Isolated (10/3/2023)    Social Connection and Isolation Panel [NHANES]     Frequency of Communication with Friends and Family: More than three times a week     Frequency of Social Gatherings with Friends and Family: Patient declined     Attends [employer] Services: More than 4 times per year     Active Member of Clubs or Organizations: No     Attends Club or Organization Meetings: Never     Marital Status: Never    Housing Stability: Low Risk  (10/3/2023)    Housing Stability Vital Sign     Unable to Pay for Housing in the Last Year: No     Number of Places Lived in the Last Year: 1     Unstable Housing in the Last Year: No       Review of Systems   Constitutional: Negative for chills and fever.    Cardiovascular:  Positive for leg swelling. Negative for claudication.   Respiratory:  Negative for cough and shortness of breath.    Skin:  Positive for dry skin, nail changes and poor wound healing. Negative for itching and rash.   Musculoskeletal:  Positive for arthritis, joint pain, myalgias and stiffness. Negative for falls, joint swelling and muscle weakness.   Gastrointestinal:  Negative for diarrhea, nausea and vomiting.   Neurological:  Positive for numbness and paresthesias. Negative for tremors and weakness.   Psychiatric/Behavioral:  Negative for altered mental status and hallucinations.            Objective:      There were no vitals filed for this visit.      Physical Exam  Vitals reviewed.   Constitutional:       Appearance: He is well-developed.   HENT:      Head: Normocephalic.   Cardiovascular:      Comments: Dorsalis pedis and posterior tibial pulses are diminished RLE. Toes are cool to touch. Feet are warm proximally.There is decreased digital hair. Skin is atrophic, slightly hyperpigmented, and mildly edematous      Pulmonary:      Effort: No respiratory distress.   Musculoskeletal:      Right lower le+ Edema present.      Left lower le+ Edema present.      Comments: L foot modified chopart's amputation.      Skin:     General: Skin is warm and dry.      Coloration: Skin is not jaundiced or pale.      Findings: Wound (see below) present. No bruising.      Comments: Overall thickening of skin of b/l legs and ankles    Mild hyperpigmentation to skin of both ankles and/or feet, consistent with hemosiderin deposits.    Neurological:      Mental Status: He is alert and oriented to person, place, and time.      Sensory: Sensory deficit present.      Comments: Light touch, proprioception, and sharp/dull sensation are all intact bilaterally. Protective threshold with the [place]-Wienstein monofilament is diminished bilaterally.    Psychiatric:         Behavior: Behavior normal.         Thought  Content: Thought content normal.         Judgment: Judgment normal.       1/22/2024    right                Left              12/04/2023    Right                  Left              Narrative & Impression  EXAMINATION:  US LOWER EXTREMITY ARTERIES BILATERAL     CLINICAL HISTORY:  PAD, nonhealing ulcer of RLE;     TECHNIQUE:  Bilateral lower extremity arterial duplex ultrasound examination performed. Multiple gray scale and color doppler images were obtained in addition to waveform analysis.     COMPARISON:  Lower extremity arterial ultrasound 03/10/2023, CTA runoff 06/16/2022     FINDINGS:  The peak systolic velocities on the right are as follows, in centimeters/second:     Common femoral artery: 140 with triphasic waveform     Deep femoral artery: 64 with triphasic waveform     Superficial femoral artery, proximal: 124 triphasic waveform     Superficial femoral artery, mid portion: 130 with phasic waveform     Superficial femoral artery, distal: 119 with triphasic waveform     Proximal popliteal artery: 72 which phasic waveform     Distal popliteal artery: 130 with triphasic from     Anterior tibial artery: 163 with biphasic waveform     Posterior tibial artery: 33 with monophasic waveform diminutive in caliber and difficult to visualize noting severe multifocal atherosclerotic disease on prior CTA runoff.     The peak systolic velocities on the left are as follows, in centimeters/second:     Common femoral artery: 132 with triphasic waveform     Deep femoral artery: 236 with triphasic waveform     Superficial femoral artery, proximal: 115 with triphasic waveform     Superficial femoral artery, mid portion: 113 with triphasic waveform     Superficial femoral artery, distal: 106 with triphasic waveform     Proximal popliteal artery: 115 with triphasic waveform     Distal popliteal artery: 85 with triphasic waveform     Anterior tibial artery: 80 with triphasic waveform     Posterior tibial artery: 88 with monophasic  waveform     Prominent to mildly enlarged bilateral inguinal nodes.     Impression:     Velocity doubling in the right anterior tibial artery which estimates a moderate degree of stenosis in this vessel.     Diminutive right posterior tibial artery which is difficult to visualize likely reflecting areas of severe narrowing noting severe multifocal atherosclerotic disease visualized on prior CTA runoff.     Electronically signed by resident: Fatmata Rm  Date:                                            10/07/2023  Time:                                           08:27     Electronically signed by: Sukumar Krause MD  Date:                                            10/07/2023  Time:                                           10:46       Assessment:       Encounter Diagnoses   Name Primary?    Type II diabetes mellitus with neurological manifestations Yes    Type II diabetes mellitus with peripheral circulatory disorder     Pressure injury of left heel, stage 4     Chronic heel ulcer, right, with fat layer exposed     Chronic osteomyelitis involving ankle and foot, left     Venous stasis ulcer of lower leg            Plan:     Problem List Items Addressed This Visit       Pressure injury of left heel, stage 4    Relevant Orders    SUBSEQUENT HOME HEALTH ORDERS     Other Visit Diagnoses       Type II diabetes mellitus with neurological manifestations    -  Primary    Type II diabetes mellitus with peripheral circulatory disorder        Chronic heel ulcer, right, with fat layer exposed        Chronic osteomyelitis involving ankle and foot, left        Relevant Orders    SUBSEQUENT HOME HEALTH ORDERS    Venous stasis ulcer of lower leg        Relevant Orders    SUBSEQUENT HOME HEALTH ORDERS             I counseled the patient on his conditions, their implications and medical management.    Education about the diabetic foot, neuropathy, and prevention of limb loss.    Discussed wound healing cycle, skin integrity, ways  to care for skin.Counseled patient on the effects of biomechanical pressure, peripheral arterial occlusive disease, high blood glucose on healing. He verbalizes understanding that it can increase the chances of delayed healing and this prolonged exposure leads to infection or progression of infection which subsequently can result in loss of limb.    Adequate vitamin supplementation, protein intake, and hydration - discussed with patient    The wound is cleansed of foreign material as much as possible and the base inspected for bone or abscess.  No palpable bone. Legs with thick malodorous drainage.    Dressings: Hydrofera blue to all  lesions of the lower extremity bilaterally followed by bilateral zinc coflex footballs all which were well tolerated and without adverse effect    Offloading:  New Darco shoe dispensed to the right lower extremity    Subsequent home health orders placed.    Rx professional arts abx powder ordered for wound care    Antibiosis per Infectious Disease    Follow-up:  2 weeks in [facility] or with his usual wound care provider (staff will assist with scheduling).  but should call [facility] immediately if any signs of infection, such as fever, chills, sweats, increased redness or pain.    Short-term goals include maintaining good offloading and minimizing bioburden, promoting granulation and epithelialization to healing.  Long-term goals include keeping the wound healed by good offloading and medical management under the direction of internist.    Diabetic Foot Education. Patient reminded of the importance of good nutrition and blood sugar control to help prevent podiatric complications of diabetes. Patient instructed on proper foot hygeine. We discussed wearing proper shoe gear, daily foot inspections, never walking without protective shoe gear, never putting sharp instruments to feet.        I spent a total of  61 minutes on the day of the visit.This includes face to face time and  non-face to face time preparing to see the patient (eg, review of tests), obtaining and/or reviewing separately obtained history, documenting clinical information in the electronic or other health record, independently interpreting results and communicating results to the patient/family/caregiver, or care coordinator.    Procedures

## 2024-01-25 ENCOUNTER — TELEPHONE (OUTPATIENT)
Dept: WOUND CARE | Facility: HOSPITAL | Age: 74
End: 2024-01-25
Payer: MEDICARE

## 2024-01-25 NOTE — TELEPHONE ENCOUNTER
Attempted to call patient in regards to wound care referral. No answer and unable to leave voicemail. Will try again later

## 2024-01-26 ENCOUNTER — TELEPHONE (OUTPATIENT)
Dept: PODIATRY | Facility: CLINIC | Age: 74
End: 2024-01-26
Payer: MEDICARE

## 2024-01-26 NOTE — TELEPHONE ENCOUNTER
----- Message from Bina Cuenca DPM sent at 1/26/2024 10:01 AM CST -----  Hey I did HH wound care orders on his visit.  They are available on the chart    ----- Message -----  From: Ashanti Mann MA  Sent: 1/26/2024   9:39 AM CST  To: Bina Cuenca DPM    Hi Dr Cuenca     Called [facility] to check on Mr. Castañeda appointment and the nurse stated that they not able to contact him, he is not answering the phone. Nurse want's to know if there is a way you can write wound care orders, to see if someone can go to his house, because he was having trouble getting to [facility]. Please advise               ----- Message -----  From: Bina Cuenca DPM  Sent: 1/25/2024  11:56 AM CST  To: Ashanti Mann MA    Ok thank you   ----- Message -----  From: Ashanti Mann MA  Sent: 1/24/2024   4:07 PM CST  To: Bina Cuenca DPM    Called [facility] to schedule an appointment for patient no one answer left message to help schedule an appointment.      ----- Message -----  From: Bina Cuenca DPM  Sent: 1/22/2024   6:49 PM CST  To: Kostas HERNANDEZ Staff    Please assist with getting patient placed back on [facility] schedule, new referral placed [facility]

## 2024-01-29 ENCOUNTER — EXTERNAL HOME HEALTH (OUTPATIENT)
Dept: HOME HEALTH SERVICES | Facility: HOSPITAL | Age: 74
End: 2024-01-29
Payer: MEDICARE

## 2024-02-21 ENCOUNTER — HOSPITAL ENCOUNTER (EMERGENCY)
Facility: HOSPITAL | Age: 74
Discharge: HOME | End: 2024-02-21
Attending: STUDENT IN AN ORGANIZED HEALTH CARE EDUCATION/TRAINING PROGRAM
Payer: MEDICARE

## 2024-02-21 VITALS
SYSTOLIC BLOOD PRESSURE: 202 MMHG | HEART RATE: 55 BPM | DIASTOLIC BLOOD PRESSURE: 83 MMHG | OXYGEN SATURATION: 97 % | TEMPERATURE: 99 F | RESPIRATION RATE: 18 BRPM

## 2024-02-21 DIAGNOSIS — M86.60 CHRONIC OSTEOMYELITIS: ICD-10-CM
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
## 2024-02-21 LAB
POCT GLUCOSE: 60 MG/DL (ref 70–110)
POCT GLUCOSE: 76 MG/DL (ref 70–110)

## 2024-02-21 PROCEDURE — 82962 GLUCOSE BLOOD TEST: CPT

## 2024-02-21 PROCEDURE — 99283 EMERGENCY DEPT VISIT LOW MDM: CPT

## 2024-02-21 PROCEDURE — 25000003 PHARM REV CODE 250: Performed by: STUDENT IN AN ORGANIZED HEALTH CARE EDUCATION/TRAINING PROGRAM

## 2024-02-21 RX ORDER — ACETAMINOPHEN 325 MG/1
650 TABLET ORAL
Status: COMPLETED | OUTPATIENT
Start: 2024-02-21 | End: 2024-02-21

## 2024-02-21 RX ADMIN — ACETAMINOPHEN 650 MG: 325 TABLET ORAL at 04:02

## 2024-02-21 NOTE — ED NOTES
Nurses Note -- 4 Eyes      2/21/2024   2:57 PM      Skin assessed during: Daily Assessment      [] No Altered Skin Integrity Present    []Prevention Measures Documented      [x] Yes- Altered Skin Integrity Present or Discovered   [] LDA Added if Not in Epic (Describe Wound)   [] New Altered Skin Integrity was Present on Admit and Documented in LDA   [x] Wound Image Taken    Wound Care Consulted? No    Attending Nurse:  BEREKET Camejo     Second RN/Staff Member:   BEREKET Latif

## 2024-02-21 NOTE — ED TRIAGE NOTES
Pt arrives via EMS verbalizing numbness, tingling and pain 6/10 in BLE that began this morning. L foot amputee. Pt states he has a nurse come to his house to do wound care 2 times a week. Denies n/v, diarrhea, fever or chills.

## 2024-02-21 NOTE — ED PROVIDER NOTES
"Encounter Date: 2/21/2024       History     Chief Complaint   Patient presents with    Leg Pain     Bilateral leg pain, left foot amputated. Necrosis to both legs     HPI    73 y.o. male with DM2, HLD, HTN, chronic osteomyelitis of L heel, L TMA, PAD, hx of ESBL klebs, acinetobacter bacteremia, presenting after a fall from his wheelchair at home, with no complaints.    Patient reports that he generalized difficulty transferring from bed to wheelchair at home, today he fell onto his left side and L arm while being transferred.  Lives at home with his sister and brother.  Has home health wound care visits 3 times a week Monday Wednesday Friday, had dressing changed on Monday.    Denies any new/worsening pain to bilateral lower extremities, no fever/chills/nausea/vomiting.  Reports that after he fell out of his wheelchair, onto his left side, he caught himself with his left arm, and did not his head, no LOC, reports that he feels at his baseline, but his sister "is crazy" and called 911.    Denies chest pain or new pain to upper extremities or lower extremities.     Review of patient's allergies indicates:  No Known Allergies  Past Medical History:   Diagnosis Date    Arthritis     legs    Diabetes mellitus     Diabetes mellitus, type 2     Hyperlipidemia     Hypertension     Osteomyelitis     Palliative care encounter 5/24/2023     Past Surgical History:   Procedure Laterality Date    ANGIOGRAPHY OF LOWER EXTREMITY N/A 2/3/2021    Procedure: Angiogram Extremity Bilateral;  Surgeon: Ernst Chacko MD;  Location: [address];  Service: Peripheral Vascular;  Laterality: N/A;  7.4 mintues fluoroscopy time  816.15 mGy  170.17 Gycm2    AORTOGRAPHY WITH EXTREMITY RUNOFF Bilateral 2/3/2021    Procedure: AORTOGRAM, WITH EXTREMITY RUNOFF;  Surgeon: Ernst Chacko MD;  Location: [facility] OR [address] [place] [address];  Service: Peripheral Vascular;  Laterality: Bilateral;    DEBRIDEMENT OF FOOT Left 2/23/2021    Procedure: DEBRIDEMENT, " LEFT HEEL;  Surgeon: Mayra Schroeder DPM;  Location: [facility] OR [address] [place] [address];  Service: Podiatry;  Laterality: Left;    FOOT AMPUTATION  October 2010    left high midfoot amputation    IMPLANTATION OF LEADLESS PACEMAKER N/A 10/12/2023    Procedure: [id number];  Surgeon: VANESSA Delatorre MD;  Location: [facility] EP LAB;  Service: Cardiology;  Laterality: N/A;  AVB, MICRA, EH, [place], [place] [address]     Family History   Problem Relation Age of Onset    Diabetes Mother     Heart disease Mother     Stroke Sister     Cancer Brother     Diabetes Sister      Social History     Tobacco Use    Smoking status: Never    Smokeless tobacco: Never   Substance Use Topics    Alcohol use: No     Comment: occassional    Drug use: No     Review of Systems    Physical Exam     Initial Vitals [02/21/24 1305]   BP Pulse Resp Temp SpO2   136/72 94 18 98.2 °F (36.8 °C) 98 %      MAP       --         Physical Exam    Constitutional: He appears well-developed and well-nourished.   HENT:   Head: Normocephalic and atraumatic.   Eyes: EOM are normal. Pupils are equal, round, and reactive to light.   Cardiovascular:  Normal rate and regular rhythm.           Decreased tibial pulses, dopplerable tibial posterior pulses bilaterally, bl LE warm   Pulmonary/Chest: Effort normal.   Abdominal: He exhibits no distension.   Musculoskeletal:         General: No edema.      Comments: Leg lift intact bilaterally, no tenderness to palpation of hip, no bony tenderness to upper or lower extremities, full range of motion of upper extremities, no chest wall tenderness to compression     Neurological: He is alert and oriented to person, place, and time.   Skin: Skin is warm and dry.   Chronic lymphedematous skin changes as seen in picture, with left TMA, L distal stump with ulceration, no drainage, no significant tenderness to palpation, bl LE warm   Psychiatric: He has a normal mood and affect.                               ED Course   Procedures  Labs  Reviewed   POCT GLUCOSE - Abnormal; Notable for the following components:       Result Value    POCT Glucose 60 (*)     All other components within normal limits   POCT GLUCOSE   POCT GLUCOSE MONITORING CONTINUOUS          Imaging Results    None          Medications   acetaminophen tablet 650 mg (650 mg Oral Given 2/21/24 1605)     Medical Decision Making  73 y.o. male with DM2, HLD, HTN, chronic osteomyelitis of L heel, L TMA, PAD, hx of ESBL klebs, acinetobacter bacteremia, presenting after a fall from his wheelchair at home, with no complaints.    Differential diagnosis includes but is not limited to:  Bony fracture, contusion, acute on chronic osteomyelitis    Patient with no focal neuro deficits, no LOC, no head trauma, no bony tenderness to upper or lower extremities, leg lift is intact bilaterally, patient denies any new pain.  Less concern for bony fracture to left arm or lower extremities.   Reports compliance with Eliquis 5 b.i.d., less concern for acute DVT.  Patient has chronic skin changes and chronic osteomyelitis, given no new drainage, no fever, no increased pain, no other infectious symptoms, less concern for acute osteomyelitis or acute infection of the lower extremities.  Lower extremities today appear somewhat similar to images taken a few months ago has per chart review, with left lower extremity ulcer slightly enlarged in size but no drainage, crepitus, not significantly tender to palpation.  Given no new symptoms, vitals within normal limits for patient, will not pursue acute osteomyelitis workup at this time.  Has a wound care follow up appointment in 2 days, along with 3 times a week wound care visits to the home.    Patient with initial glucose of 60, reports he did not eat all today, improved to 75 after eating a sandwich and juice.    I discussed with the patient/family the diagnosis, treatment plan, indications for return to the emergency department, as well as for expected follow-up.  The patient/family verbalized an understanding. The patient/family is asked if there were any questions or concerns, which were addressed to patient/family satisfaction. Patient/family understands and is agreeable to the plan.     DISCLAIMER: This note was prepared with [employer] voice recognition transcription software. Garbled syntax, mangled pronouns, and other bizarre constructions may be attributed to that software system.        Risk  OTC drugs.                                      Clinical Impression:  Final diagnoses:  [W19.XXXA] Fall, initial encounter (Primary)  [M86.60] Chronic osteomyelitis          ED Disposition Condition    Discharge Stable          ED Prescriptions    None       Follow-up Information       Follow up With Specialties Details Why Contact Info Additional Information    [id number]  chronic osteo [address] [facility] [phone] [facility] - Main Building, 5th Floor Please park in [facility] and use Atrium elevator             January Romero MD  02/21/24 3418

## 2024-02-22 NOTE — HIM RECORD RETIREMENT NOTE
correction of Incomplete Medical Record    2/22/24    Patient Name: Saji Castañeda  Contact Serial # (CSN): [id number]  Patient Medical Record # (MRN): [id number]  Date of Service: External [facility] on 11/17/2021  Physician Name: Nia Alvarez NP     This record has been reviewed and is being retired as incomplete by the approval of the  Medical Staff Operating Committee (MSOC)     On 03/09/2022., due to:  Unavailability of Provider     Missing Information/Comments:  []    Discharge Summary   []    DC Note/Short Stay Summary   []    ED Provider Note   []    Delivery Note   []    History & Physical   []   Operative Note   []     Procedure Note   []     Physician Order   [x]     Verbal Order   []       Other, specify:

## 2024-02-29 ENCOUNTER — TELEPHONE (OUTPATIENT)
Dept: PODIATRY | Facility: CLINIC | Age: 74
End: 2024-02-29
Payer: MEDICARE

## 2024-02-29 NOTE — TELEPHONE ENCOUNTER
Bina Cuenca, DPM  P Kostas HERNANDEZ Staff  Please contact patient and alert him that he has been discharged and then see if he would be able to get to [facility] regularly if we ordered wound clinic consult    I called patient he requested to be seen at [facility] , pt is scheduled on tues with Dr. Wilson.

## 2024-03-04 ENCOUNTER — DOCUMENT SCAN (OUTPATIENT)
Dept: HOME HEALTH SERVICES | Facility: HOSPITAL | Age: 74
End: 2024-03-04
Payer: MEDICARE

## 2024-03-05 ENCOUNTER — HOSPITAL ENCOUNTER (INPATIENT)
Facility: HOSPITAL | Age: 74
LOS: 31 days | Discharge: NURSING, REHAB OR LONG-TERM CARE | DRG: 474 | End: 2024-04-05
Attending: EMERGENCY MEDICINE | Admitting: STUDENT IN AN ORGANIZED HEALTH CARE EDUCATION/TRAINING PROGRAM
Payer: MEDICARE

## 2024-03-05 DIAGNOSIS — E11.9 INSULIN DEPENDENT TYPE 2 DIABETES MELLITUS: Chronic | ICD-10-CM

## 2024-03-05 DIAGNOSIS — Z79.01 CHRONIC ANTICOAGULATION: ICD-10-CM

## 2024-03-05 DIAGNOSIS — I10 ESSENTIAL HYPERTENSION: Chronic | ICD-10-CM

## 2024-03-05 DIAGNOSIS — E11.65 TYPE 2 DIABETES MELLITUS WITH HYPERGLYCEMIA, WITH LONG-TERM CURRENT USE OF INSULIN: ICD-10-CM

## 2024-03-05 DIAGNOSIS — I44.0 PROLONGED P-R INTERVAL: ICD-10-CM

## 2024-03-05 DIAGNOSIS — E87.20 LACTIC ACIDOSIS: ICD-10-CM

## 2024-03-05 DIAGNOSIS — N17.9 AKI (ACUTE KIDNEY INJURY): ICD-10-CM

## 2024-03-05 DIAGNOSIS — Z79.4 TYPE 2 DIABETES MELLITUS WITH HYPERGLYCEMIA, WITH LONG-TERM CURRENT USE OF INSULIN: ICD-10-CM

## 2024-03-05 DIAGNOSIS — E11.10 DKA (DIABETIC KETOACIDOSIS): ICD-10-CM

## 2024-03-05 DIAGNOSIS — I50.9 HEART FAILURE: ICD-10-CM

## 2024-03-05 DIAGNOSIS — R07.9 CHEST PAIN: ICD-10-CM

## 2024-03-05 DIAGNOSIS — E11.10 DIABETIC KETOACIDOSIS WITHOUT COMA ASSOCIATED WITH TYPE 2 DIABETES MELLITUS: ICD-10-CM

## 2024-03-05 DIAGNOSIS — I73.9 PERIPHERAL ARTERIAL DISEASE: ICD-10-CM

## 2024-03-05 DIAGNOSIS — A41.9 SEVERE SEPSIS: ICD-10-CM

## 2024-03-05 DIAGNOSIS — E78.49 OTHER HYPERLIPIDEMIA: ICD-10-CM

## 2024-03-05 DIAGNOSIS — M86.9 OSTEOMYELITIS OF LEFT LOWER EXTREMITY: ICD-10-CM

## 2024-03-05 DIAGNOSIS — E11.00 HYPEROSMOLAR HYPERGLYCEMIC STATE (HHS): ICD-10-CM

## 2024-03-05 DIAGNOSIS — L03.116 CELLULITIS OF LEFT LOWER LEG: ICD-10-CM

## 2024-03-05 DIAGNOSIS — I44.2 COMPLETE HEART BLOCK: ICD-10-CM

## 2024-03-05 DIAGNOSIS — Z89.432 HISTORY OF AMPUTATION OF LEFT FOOT: ICD-10-CM

## 2024-03-05 DIAGNOSIS — I48.0 PAROXYSMAL ATRIAL FIBRILLATION: Chronic | ICD-10-CM

## 2024-03-05 DIAGNOSIS — A41.9 SEPSIS: Primary | ICD-10-CM

## 2024-03-05 DIAGNOSIS — R00.1 BRADYCARDIA: ICD-10-CM

## 2024-03-05 DIAGNOSIS — R94.31 QT PROLONGATION: ICD-10-CM

## 2024-03-05 DIAGNOSIS — R79.89 ELEVATED TROPONIN: ICD-10-CM

## 2024-03-05 DIAGNOSIS — I82.409 DVT (DEEP VENOUS THROMBOSIS): ICD-10-CM

## 2024-03-05 DIAGNOSIS — Z91.89 AT RISK FOR LONG QT SYNDROME: ICD-10-CM

## 2024-03-05 DIAGNOSIS — I50.9 CHF (CONGESTIVE HEART FAILURE): ICD-10-CM

## 2024-03-05 DIAGNOSIS — Z79.4 INSULIN DEPENDENT TYPE 2 DIABETES MELLITUS: Chronic | ICD-10-CM

## 2024-03-05 DIAGNOSIS — R65.20 SEVERE SEPSIS: ICD-10-CM

## 2024-03-05 DIAGNOSIS — N18.32 STAGE 3B CHRONIC KIDNEY DISEASE: ICD-10-CM

## 2024-03-05 PROBLEM — N18.30 CKD (CHRONIC KIDNEY DISEASE) STAGE 3, GFR 30-59 ML/MIN: Status: ACTIVE | Noted: 2024-03-05

## 2024-03-05 PROBLEM — E78.5 HYPERLIPIDEMIA: Status: ACTIVE | Noted: 2024-03-05

## 2024-03-05 PROBLEM — N18.4 CKD (CHRONIC KIDNEY DISEASE), STAGE IV: Status: ACTIVE | Noted: 2024-03-05

## 2024-03-05 LAB
ALBUMIN SERPL BCP-MCNC: 2.3 G/DL (ref 3.5–5.2)
ALLENS TEST: ABNORMAL
ALLENS TEST: ABNORMAL
ALP SERPL-CCNC: 81 U/L (ref 55–135)
ALT SERPL W/O P-5'-P-CCNC: 19 U/L (ref 10–44)
ANION GAP SERPL CALC-SCNC: 14 MMOL/L (ref 8–16)
ANION GAP SERPL CALC-SCNC: 17 MMOL/L (ref 8–16)
AST SERPL-CCNC: 40 U/L (ref 10–40)
B-OH-BUTYR BLD STRIP-SCNC: 1.4 MMOL/L (ref 0–0.5)
BACTERIA #/AREA URNS AUTO: ABNORMAL /HPF
BASOPHILS # BLD AUTO: 0.02 K/UL (ref 0–0.2)
BASOPHILS NFR BLD: 0.1 % (ref 0–1.9)
BILIRUB SERPL-MCNC: 0.6 MG/DL (ref 0.1–1)
BILIRUB UR QL STRIP: NEGATIVE
BNP SERPL-MCNC: 119 PG/ML (ref 0–99)
BUN SERPL-MCNC: 26 MG/DL (ref 8–23)
BUN SERPL-MCNC: 29 MG/DL (ref 6–30)
BUN SERPL-MCNC: 29 MG/DL (ref 8–23)
CALCIUM SERPL-MCNC: 8.6 MG/DL (ref 8.7–10.5)
CALCIUM SERPL-MCNC: 9.5 MG/DL (ref 8.7–10.5)
CHLORIDE SERPL-SCNC: 91 MMOL/L (ref 95–110)
CHLORIDE SERPL-SCNC: 92 MMOL/L (ref 95–110)
CHLORIDE SERPL-SCNC: 99 MMOL/L (ref 95–110)
CLARITY UR REFRACT.AUTO: CLEAR
CO2 SERPL-SCNC: 23 MMOL/L (ref 23–29)
CO2 SERPL-SCNC: 26 MMOL/L (ref 23–29)
COLOR UR AUTO: YELLOW
CREAT SERPL-MCNC: 1.8 MG/DL (ref 0.5–1.4)
CREAT SERPL-MCNC: 1.9 MG/DL (ref 0.5–1.4)
CREAT SERPL-MCNC: 2.1 MG/DL (ref 0.5–1.4)
CRP SERPL-MCNC: 183.7 MG/L (ref 0–8.2)
DIFFERENTIAL METHOD BLD: ABNORMAL
EOSINOPHIL # BLD AUTO: 0 K/UL (ref 0–0.5)
EOSINOPHIL NFR BLD: 0.1 % (ref 0–8)
ERYTHROCYTE [DISTWIDTH] IN BLOOD BY AUTOMATED COUNT: 16.6 % (ref 11.5–14.5)
EST. GFR  (NO RACE VARIABLE): 32.2 ML/MIN/1.73 M^2
EST. GFR  (NO RACE VARIABLE): 38.8 ML/MIN/1.73 M^2
ESTIMATED AVG GLUCOSE: ABNORMAL MG/DL (ref 68–131)
GLUCOSE SERPL-MCNC: 553 MG/DL (ref 70–110)
GLUCOSE SERPL-MCNC: 634 MG/DL (ref 70–110)
GLUCOSE SERPL-MCNC: 677 MG/DL (ref 70–110)
GLUCOSE UR QL STRIP: ABNORMAL
HBA1C MFR BLD: >14 % (ref 4–5.6)
HCO3 UR-SCNC: 29.7 MMOL/L (ref 24–28)
HCT VFR BLD AUTO: 31 % (ref 40–54)
HCT VFR BLD CALC: 31 %PCV (ref 36–54)
HGB BLD-MCNC: 9.2 G/DL (ref 14–18)
HGB UR QL STRIP: ABNORMAL
IMM GRANULOCYTES # BLD AUTO: 0.06 K/UL (ref 0–0.04)
IMM GRANULOCYTES NFR BLD AUTO: 0.4 % (ref 0–0.5)
INR PPP: 1.1 (ref 0.8–1.2)
KETONES UR QL STRIP: NEGATIVE
LACTATE SERPL-SCNC: 3.1 MMOL/L (ref 0.5–2.2)
LDH SERPL L TO P-CCNC: 4.61 MMOL/L (ref 0.5–2.2)
LEUKOCYTE ESTERASE UR QL STRIP: NEGATIVE
LIPASE SERPL-CCNC: 19 U/L (ref 4–60)
LYMPHOCYTES # BLD AUTO: 0.6 K/UL (ref 1–4.8)
LYMPHOCYTES NFR BLD: 4.2 % (ref 18–48)
MAGNESIUM SERPL-MCNC: 2.2 MG/DL (ref 1.6–2.6)
MCH RBC QN AUTO: 24.8 PG (ref 27–31)
MCHC RBC AUTO-ENTMCNC: 29.7 G/DL (ref 32–36)
MCV RBC AUTO: 84 FL (ref 82–98)
MICROSCOPIC COMMENT: ABNORMAL
MONOCYTES # BLD AUTO: 0.8 K/UL (ref 0.3–1)
MONOCYTES NFR BLD: 5.7 % (ref 4–15)
NEUTROPHILS # BLD AUTO: 13.1 K/UL (ref 1.8–7.7)
NEUTROPHILS NFR BLD: 89.5 % (ref 38–73)
NITRITE UR QL STRIP: NEGATIVE
NRBC BLD-RTO: 0 /100 WBC
OHS QRS DURATION: 102 MS
OHS QTC CALCULATION: 509 MS
OSMOLALITY SERPL: 321 MOSM/KG (ref 280–300)
PCO2 BLDA: 48.8 MMHG (ref 35–45)
PH SMN: 7.39 [PH] (ref 7.35–7.45)
PH UR STRIP: 5 [PH] (ref 5–8)
PHOSPHATE SERPL-MCNC: 2.8 MG/DL (ref 2.7–4.5)
PLATELET # BLD AUTO: 480 K/UL (ref 150–450)
PMV BLD AUTO: 9.8 FL (ref 9.2–12.9)
PO2 BLDA: 20 MMHG (ref 40–60)
POC BE: 5 MMOL/L
POC IONIZED CALCIUM: 1.12 MMOL/L (ref 1.06–1.42)
POC SATURATED O2: 30 % (ref 95–100)
POC TCO2 (MEASURED): 30 MMOL/L (ref 23–29)
POC TCO2: 31 MMOL/L (ref 24–29)
POCT GLUCOSE: 110 MG/DL (ref 70–110)
POCT GLUCOSE: 139 MG/DL (ref 70–110)
POCT GLUCOSE: 162 MG/DL (ref 70–110)
POCT GLUCOSE: 181 MG/DL (ref 70–110)
POCT GLUCOSE: 229 MG/DL (ref 70–110)
POCT GLUCOSE: 234 MG/DL (ref 70–110)
POCT GLUCOSE: 333 MG/DL (ref 70–110)
POCT GLUCOSE: 412 MG/DL (ref 70–110)
POCT GLUCOSE: 98 MG/DL (ref 70–110)
POCT GLUCOSE: >500 MG/DL (ref 70–110)
POTASSIUM BLD-SCNC: 3.4 MMOL/L (ref 3.5–5.1)
POTASSIUM SERPL-SCNC: 3.4 MMOL/L (ref 3.5–5.1)
POTASSIUM SERPL-SCNC: 3.4 MMOL/L (ref 3.5–5.1)
PROCALCITONIN SERPL IA-MCNC: 0.19 NG/ML
PROT SERPL-MCNC: 8.5 G/DL (ref 6–8.4)
PROT UR QL STRIP: ABNORMAL
PROTHROMBIN TIME: 11.3 SEC (ref 9–12.5)
RBC # BLD AUTO: 3.71 M/UL (ref 4.6–6.2)
RBC #/AREA URNS AUTO: 6 /HPF (ref 0–4)
SAMPLE: ABNORMAL
SITE: ABNORMAL
SITE: ABNORMAL
SODIUM BLD-SCNC: 135 MMOL/L (ref 136–145)
SODIUM SERPL-SCNC: 135 MMOL/L (ref 136–145)
SODIUM SERPL-SCNC: 136 MMOL/L (ref 136–145)
SP GR UR STRIP: 1.02 (ref 1–1.03)
SQUAMOUS #/AREA URNS AUTO: 1 /HPF
TROPONIN I SERPL DL<=0.01 NG/ML-MCNC: 0.04 NG/ML (ref 0–0.03)
URN SPEC COLLECT METH UR: ABNORMAL
WBC # BLD AUTO: 14.59 K/UL (ref 3.9–12.7)
WBC #/AREA URNS AUTO: 3 /HPF (ref 0–5)
YEAST UR QL AUTO: ABNORMAL

## 2024-03-05 PROCEDURE — 25000003 PHARM REV CODE 250

## 2024-03-05 PROCEDURE — 82803 BLOOD GASES ANY COMBINATION: CPT

## 2024-03-05 PROCEDURE — 83605 ASSAY OF LACTIC ACID: CPT

## 2024-03-05 PROCEDURE — 84145 PROCALCITONIN (PCT): CPT

## 2024-03-05 PROCEDURE — 63600175 PHARM REV CODE 636 W HCPCS

## 2024-03-05 PROCEDURE — 96365 THER/PROPH/DIAG IV INF INIT: CPT

## 2024-03-05 PROCEDURE — 80048 BASIC METABOLIC PNL TOTAL CA: CPT | Mod: XB

## 2024-03-05 PROCEDURE — 83880 ASSAY OF NATRIURETIC PEPTIDE: CPT

## 2024-03-05 PROCEDURE — 85025 COMPLETE CBC W/AUTO DIFF WBC: CPT

## 2024-03-05 PROCEDURE — 93005 ELECTROCARDIOGRAM TRACING: CPT

## 2024-03-05 PROCEDURE — 83735 ASSAY OF MAGNESIUM: CPT

## 2024-03-05 PROCEDURE — 83690 ASSAY OF LIPASE: CPT

## 2024-03-05 PROCEDURE — 36410 VNPNXR 3YR/> PHY/QHP DX/THER: CPT

## 2024-03-05 PROCEDURE — 27000207 HC ISOLATION

## 2024-03-05 PROCEDURE — 12000002 HC ACUTE/MED SURGE SEMI-PRIVATE ROOM

## 2024-03-05 PROCEDURE — 84484 ASSAY OF TROPONIN QUANT: CPT

## 2024-03-05 PROCEDURE — 83036 HEMOGLOBIN GLYCOSYLATED A1C: CPT

## 2024-03-05 PROCEDURE — 85610 PROTHROMBIN TIME: CPT

## 2024-03-05 PROCEDURE — 63600175 PHARM REV CODE 636 W HCPCS: Performed by: EMERGENCY MEDICINE

## 2024-03-05 PROCEDURE — 80053 COMPREHEN METABOLIC PANEL: CPT

## 2024-03-05 PROCEDURE — 83930 ASSAY OF BLOOD OSMOLALITY: CPT

## 2024-03-05 PROCEDURE — 99900035 HC TECH TIME PER 15 MIN (STAT)

## 2024-03-05 PROCEDURE — C1751 CATH, INF, PER/CENT/MIDLINE: HCPCS

## 2024-03-05 PROCEDURE — 87040 BLOOD CULTURE FOR BACTERIA: CPT | Mod: 59

## 2024-03-05 PROCEDURE — 84100 ASSAY OF PHOSPHORUS: CPT

## 2024-03-05 PROCEDURE — 82010 KETONE BODYS QUAN: CPT

## 2024-03-05 PROCEDURE — 82962 GLUCOSE BLOOD TEST: CPT

## 2024-03-05 PROCEDURE — 86140 C-REACTIVE PROTEIN: CPT

## 2024-03-05 PROCEDURE — S5010 5% DEXTROSE AND 0.45% SALINE: HCPCS

## 2024-03-05 PROCEDURE — 93010 ELECTROCARDIOGRAM REPORT: CPT | Mod: ,,, | Performed by: INTERNAL MEDICINE

## 2024-03-05 PROCEDURE — 81001 URINALYSIS AUTO W/SCOPE: CPT

## 2024-03-05 PROCEDURE — 96361 HYDRATE IV INFUSION ADD-ON: CPT

## 2024-03-05 RX ORDER — ISOSORBIDE DINITRATE 5 MG/1
10 TABLET ORAL 3 TIMES DAILY
Status: DISCONTINUED | OUTPATIENT
Start: 2024-03-05 | End: 2024-03-05

## 2024-03-05 RX ORDER — FUROSEMIDE 20 MG/1
20 TABLET ORAL DAILY
Status: DISCONTINUED | OUTPATIENT
Start: 2024-03-06 | End: 2024-03-05

## 2024-03-05 RX ORDER — ENOXAPARIN SODIUM 150 MG/ML
1 INJECTION SUBCUTANEOUS EVERY 12 HOURS
Status: DISCONTINUED | OUTPATIENT
Start: 2024-03-05 | End: 2024-03-07

## 2024-03-05 RX ORDER — DEXTROSE MONOHYDRATE AND SODIUM CHLORIDE 5; .45 G/100ML; G/100ML
125 INJECTION, SOLUTION INTRAVENOUS CONTINUOUS PRN
Status: DISCONTINUED | OUTPATIENT
Start: 2024-03-05 | End: 2024-03-06

## 2024-03-05 RX ORDER — POTASSIUM CHLORIDE 20 MEQ/1
20 TABLET, EXTENDED RELEASE ORAL ONCE
Status: COMPLETED | OUTPATIENT
Start: 2024-03-05 | End: 2024-03-05

## 2024-03-05 RX ORDER — HYDRALAZINE HYDROCHLORIDE 50 MG/1
100 TABLET, FILM COATED ORAL EVERY 8 HOURS
Status: DISCONTINUED | OUTPATIENT
Start: 2024-03-05 | End: 2024-03-07

## 2024-03-05 RX ORDER — IBUPROFEN 200 MG
24 TABLET ORAL
Status: DISCONTINUED | OUTPATIENT
Start: 2024-03-05 | End: 2024-03-06

## 2024-03-05 RX ORDER — POTASSIUM CHLORIDE 20 MEQ/1
40 TABLET, EXTENDED RELEASE ORAL ONCE
Status: COMPLETED | OUTPATIENT
Start: 2024-03-05 | End: 2024-03-05

## 2024-03-05 RX ORDER — TAMSULOSIN HYDROCHLORIDE 0.4 MG/1
0.4 CAPSULE ORAL DAILY
Status: DISCONTINUED | OUTPATIENT
Start: 2024-03-05 | End: 2024-04-05 | Stop reason: HOSPADM

## 2024-03-05 RX ORDER — SODIUM CHLORIDE 9 MG/ML
125 INJECTION, SOLUTION INTRAVENOUS CONTINUOUS
Status: DISCONTINUED | OUTPATIENT
Start: 2024-03-05 | End: 2024-03-06

## 2024-03-05 RX ORDER — INSULIN ASPART 100 [IU]/ML
7 INJECTION, SOLUTION INTRAVENOUS; SUBCUTANEOUS ONCE
Status: DISCONTINUED | OUTPATIENT
Start: 2024-03-05 | End: 2024-03-05

## 2024-03-05 RX ORDER — ACETAMINOPHEN 325 MG/1
650 TABLET ORAL EVERY 4 HOURS PRN
Status: DISCONTINUED | OUTPATIENT
Start: 2024-03-05 | End: 2024-03-06

## 2024-03-05 RX ORDER — POTASSIUM CHLORIDE 7.45 MG/ML
10 INJECTION INTRAVENOUS
Status: COMPLETED | OUTPATIENT
Start: 2024-03-05 | End: 2024-03-05

## 2024-03-05 RX ORDER — CLOPIDOGREL BISULFATE 75 MG/1
75 TABLET ORAL DAILY
Status: DISCONTINUED | OUTPATIENT
Start: 2024-03-06 | End: 2024-03-05

## 2024-03-05 RX ORDER — HEPARIN SODIUM,PORCINE/D5W 25000/250
0-40 INTRAVENOUS SOLUTION INTRAVENOUS CONTINUOUS
Status: DISCONTINUED | OUTPATIENT
Start: 2024-03-05 | End: 2024-03-05

## 2024-03-05 RX ORDER — GLUCAGON 1 MG
1 KIT INJECTION
Status: DISCONTINUED | OUTPATIENT
Start: 2024-03-05 | End: 2024-03-06

## 2024-03-05 RX ORDER — INSULIN ASPART 100 [IU]/ML
10 INJECTION, SOLUTION INTRAVENOUS; SUBCUTANEOUS ONCE
Status: COMPLETED | OUTPATIENT
Start: 2024-03-05 | End: 2024-03-05

## 2024-03-05 RX ORDER — IBUPROFEN 200 MG
16 TABLET ORAL
Status: DISCONTINUED | OUTPATIENT
Start: 2024-03-05 | End: 2024-03-06

## 2024-03-05 RX ORDER — SODIUM CHLORIDE 0.9 % (FLUSH) 0.9 %
10 SYRINGE (ML) INJECTION
Status: DISCONTINUED | OUTPATIENT
Start: 2024-03-06 | End: 2024-04-05 | Stop reason: HOSPADM

## 2024-03-05 RX ORDER — NALOXONE HCL 0.4 MG/ML
0.02 VIAL (ML) INJECTION
Status: DISCONTINUED | OUTPATIENT
Start: 2024-03-05 | End: 2024-03-06

## 2024-03-05 RX ORDER — HEPARIN SODIUM 5000 [USP'U]/ML
5000 INJECTION, SOLUTION INTRAVENOUS; SUBCUTANEOUS EVERY 8 HOURS
Status: DISCONTINUED | OUTPATIENT
Start: 2024-03-05 | End: 2024-03-05

## 2024-03-05 RX ORDER — ALUMINUM HYDROXIDE, MAGNESIUM HYDROXIDE, AND SIMETHICONE 1200; 120; 1200 MG/30ML; MG/30ML; MG/30ML
30 SUSPENSION ORAL 4 TIMES DAILY PRN
Status: DISCONTINUED | OUTPATIENT
Start: 2024-03-05 | End: 2024-04-05 | Stop reason: HOSPADM

## 2024-03-05 RX ORDER — NIFEDIPINE 30 MG/1
60 TABLET, EXTENDED RELEASE ORAL DAILY
Status: DISCONTINUED | OUTPATIENT
Start: 2024-03-05 | End: 2024-03-05

## 2024-03-05 RX ORDER — INSULIN ASPART 100 [IU]/ML
7 INJECTION, SOLUTION INTRAVENOUS; SUBCUTANEOUS
Status: DISCONTINUED | OUTPATIENT
Start: 2024-03-06 | End: 2024-03-07

## 2024-03-05 RX ORDER — SODIUM CHLORIDE 0.9 % (FLUSH) 0.9 %
10 SYRINGE (ML) INJECTION EVERY 6 HOURS
Status: DISCONTINUED | OUTPATIENT
Start: 2024-03-06 | End: 2024-04-05 | Stop reason: HOSPADM

## 2024-03-05 RX ORDER — TRIAMCINOLONE ACETONIDE 0.25 MG/G
OINTMENT TOPICAL 2 TIMES DAILY PRN
Status: DISCONTINUED | OUTPATIENT
Start: 2024-03-05 | End: 2024-04-05 | Stop reason: HOSPADM

## 2024-03-05 RX ORDER — INSULIN ASPART 100 [IU]/ML
0-5 INJECTION, SOLUTION INTRAVENOUS; SUBCUTANEOUS
Status: DISCONTINUED | OUTPATIENT
Start: 2024-03-05 | End: 2024-03-06

## 2024-03-05 RX ORDER — SODIUM CHLORIDE 0.9 % (FLUSH) 0.9 %
10 SYRINGE (ML) INJECTION
Status: DISCONTINUED | OUTPATIENT
Start: 2024-03-05 | End: 2024-03-06

## 2024-03-05 RX ORDER — GABAPENTIN 300 MG/1
300 CAPSULE ORAL 2 TIMES DAILY
Status: DISCONTINUED | OUTPATIENT
Start: 2024-03-05 | End: 2024-04-05 | Stop reason: HOSPADM

## 2024-03-05 RX ORDER — SODIUM CHLORIDE 0.9 % (FLUSH) 0.9 %
10 SYRINGE (ML) INJECTION EVERY 12 HOURS PRN
Status: DISCONTINUED | OUTPATIENT
Start: 2024-03-05 | End: 2024-04-05 | Stop reason: HOSPADM

## 2024-03-05 RX ORDER — PANTOPRAZOLE SODIUM 40 MG/1
40 TABLET, DELAYED RELEASE ORAL DAILY
Status: DISCONTINUED | OUTPATIENT
Start: 2024-03-05 | End: 2024-04-05 | Stop reason: HOSPADM

## 2024-03-05 RX ORDER — ONDANSETRON 8 MG/1
8 TABLET, ORALLY DISINTEGRATING ORAL EVERY 8 HOURS PRN
Status: DISCONTINUED | OUTPATIENT
Start: 2024-03-05 | End: 2024-03-06

## 2024-03-05 RX ORDER — TALC
6 POWDER (GRAM) TOPICAL NIGHTLY PRN
Status: DISCONTINUED | OUTPATIENT
Start: 2024-03-05 | End: 2024-03-28

## 2024-03-05 RX ORDER — ATORVASTATIN CALCIUM 40 MG/1
40 TABLET, FILM COATED ORAL DAILY
Status: DISCONTINUED | OUTPATIENT
Start: 2024-03-05 | End: 2024-04-05 | Stop reason: HOSPADM

## 2024-03-05 RX ORDER — ONDANSETRON HYDROCHLORIDE 2 MG/ML
4 INJECTION, SOLUTION INTRAVENOUS EVERY 6 HOURS PRN
Status: DISCONTINUED | OUTPATIENT
Start: 2024-03-05 | End: 2024-03-06

## 2024-03-05 RX ADMIN — SODIUM CHLORIDE 125 ML/HR: 9 INJECTION, SOLUTION INTRAVENOUS at 11:03

## 2024-03-05 RX ADMIN — PIPERACILLIN SODIUM AND TAZOBACTAM SODIUM 4.5 G: 4; .5 INJECTION, POWDER, FOR SOLUTION INTRAVENOUS at 10:03

## 2024-03-05 RX ADMIN — DEXTROSE AND SODIUM CHLORIDE 125 ML/HR: 5; 450 INJECTION, SOLUTION INTRAVENOUS at 03:03

## 2024-03-05 RX ADMIN — ENOXAPARIN SODIUM 120 MG: 120 INJECTION SUBCUTANEOUS at 10:03

## 2024-03-05 RX ADMIN — SODIUM CHLORIDE 2000 ML: 0.9 INJECTION, SOLUTION INTRAVENOUS at 09:03

## 2024-03-05 RX ADMIN — POTASSIUM CHLORIDE 20 MEQ: 1500 TABLET, EXTENDED RELEASE ORAL at 03:03

## 2024-03-05 RX ADMIN — GABAPENTIN 300 MG: 300 CAPSULE ORAL at 10:03

## 2024-03-05 RX ADMIN — PANTOPRAZOLE SODIUM 40 MG: 40 TABLET, DELAYED RELEASE ORAL at 03:03

## 2024-03-05 RX ADMIN — POTASSIUM CHLORIDE 40 MEQ: 1500 TABLET, EXTENDED RELEASE ORAL at 10:03

## 2024-03-05 RX ADMIN — ATORVASTATIN CALCIUM 40 MG: 40 TABLET, FILM COATED ORAL at 03:03

## 2024-03-05 RX ADMIN — INSULIN HUMAN 0.1 UNITS/KG/HR: 1 INJECTION, SOLUTION INTRAVENOUS at 11:03

## 2024-03-05 RX ADMIN — TAMSULOSIN HYDROCHLORIDE 0.4 MG: 0.4 CAPSULE ORAL at 03:03

## 2024-03-05 RX ADMIN — INSULIN ASPART 10 UNITS: 100 INJECTION, SOLUTION INTRAVENOUS; SUBCUTANEOUS at 10:03

## 2024-03-05 RX ADMIN — VANCOMYCIN HYDROCHLORIDE 2500 MG: 1 INJECTION, POWDER, LYOPHILIZED, FOR SOLUTION INTRAVENOUS at 10:03

## 2024-03-05 RX ADMIN — INSULIN DETEMIR 30 UNITS: 100 INJECTION, SOLUTION SUBCUTANEOUS at 06:03

## 2024-03-05 RX ADMIN — POTASSIUM CHLORIDE 10 MEQ: 7.46 INJECTION, SOLUTION INTRAVENOUS at 10:03

## 2024-03-05 NOTE — CLINICAL REVIEW
IP Sepsis Screen (most recent)       Sepsis Screen (IP) - 03/05/24 1052       Is the patient's history or complaint suggestive of a possible infection? Yes  -CB    Are there at least two of the following signs and symptoms present? Yes  -CB    Sepsis signs/symptoms - Tachycardia Tachycardia     >90  -CB    Sepsis signs/symptoms - WBC WBC < 4,000 or WBC > 12,000  -CB    Are any of the following organ dysfunction criteria present and not considered to be due to a chronic condition? Yes  -CB    Organ Dysfunction Criteria Creatinine > 2.0  -CB    Organ Dysfunction Criteria Lactate > 2.0  -CB    Initiate Sepsis Protocol No  -CB    Reason sepsis not considered Pt. receiving appropriate management  -CB              User Key  (r) = Recorded By, (t) = Taken By, (c) = Cosigned By      Initials Name    Frances Odom RN

## 2024-03-05 NOTE — HPI
Saji Castañeda is a 74 y.o. male with a medical history of DM2, HLD, HTN, chronic osteomyelitis of L heel, L TMA, PAD, hx of ESBL klebsiella, acinetobacter bacteremia, presenting with hyperglycemia. He presented to the ED via EMS and his POCT glucose on arrival was >500. Serum blood glucose 667 with anion gap of 17 and elevated ketones. Serum potassium 3.4, corrected sodium 149. Urinalysis significant for RBCs, glucosuria, and moderate bacteria and yeast. CBC revealed leukocytosis, elevated platelets, and mild anemia. CMP shows Na 135, K 3.4, BUN 29, Cr 2.1, Glucose 677, Lactate 4.61. BNP and troponin elevated. GFR 32.2. Insulin drip, IV fluids, zosyn, vancomycin, and potassium given.      He is unable to provide much history, but states that he has not been taking his home medications for at least a week. He reports shortness of breath, fatigue, and weakness for the last 6-7 days. He has chills and reports episodes of emesis. He denies abdominal pain, chest pain, palpitations, recent illness/infection, fevers, changes to bowel movements and urinary output. Patient lives with his brother and sister at home. He was last seen in the ED on 2/21 after a fall. He was hypoglycemic BGL 60 at that time which returned to normal after eating. He was also scheduled for a wound clinic appointment today 3/5 at [facility] with Dr. Wilson.

## 2024-03-05 NOTE — SUBJECTIVE & OBJECTIVE
Past Medical History:   Diagnosis Date    Arthritis     legs    Diabetes mellitus     Diabetes mellitus, type 2     Hyperlipidemia     Hypertension     Osteomyelitis     Palliative care encounter 5/24/2023       Past Surgical History:   Procedure Laterality Date    ANGIOGRAPHY OF LOWER EXTREMITY N/A 2/3/2021    Procedure: Angiogram Extremity Bilateral;  Surgeon: Ernst Chacko MD;  Location: [facility] OR [address] [place] [address];  Service: Peripheral Vascular;  Laterality: N/A;  7.4 mintues fluoroscopy time  816.15 mGy  170.17 Gycm2    AORTOGRAPHY WITH EXTREMITY RUNOFF Bilateral 2/3/2021    Procedure: AORTOGRAM, WITH EXTREMITY RUNOFF;  Surgeon: Ernst Chacko MD;  Location: [facility] OR [address] [place] [address];  Service: Peripheral Vascular;  Laterality: Bilateral;    DEBRIDEMENT OF FOOT Left 2/23/2021    Procedure: DEBRIDEMENT, LEFT HEEL;  Surgeon: aMyra Schroeder DPM;  Location: [facility] OR [address] [place] [address];  Service: Podiatry;  Laterality: Left;    FOOT AMPUTATION  October 2010    left high midfoot amputation    IMPLANTATION OF LEADLESS PACEMAKER N/A 10/12/2023    Procedure: [id number];  Surgeon: VANESSA Delatorre MD;  Location: [facility] EP LAB;  Service: Cardiology;  Laterality: N/A;  AVB, MICRA, EH, [place] [address]       Review of patient's allergies indicates:  No Known Allergies    No current facility-administered medications on file prior to encounter.     Current Outpatient Medications on File Prior to Encounter   Medication Sig    acetaminophen (TYLENOL) 325 MG tablet Take 650 mg by mouth every 6 (six) hours as needed for Temperature greater than.    acidophilus-pectin, citrus 100 million cell-10 mg Cap Take 1 capsule by mouth 2 (two) times a day.    apixaban (ELIQUIS) 5 mg Tab Take 1 tablet (5 mg total) by mouth 2 (two) times daily.    ascorbic acid, vitamin C, (VITAMIN C) 500 MG tablet Take 500 mg by mouth 2 (two) times daily.    B COMPLEX-VITAMIN B12 tablet Take 1 tablet by mouth once daily.    BD AUTOSHIELD DUO PEN NEEDLE  "30 gauge x 3/16" Ndle     BD ULTRA-FINE ADITYA PEN NEEDLE 32 gauge x 5/32" Ndle USE FOUR TIMES DAILY WITH MEALS AND NIGHTLY    blood sugar diagnostic (CONTOUR TEST STRIPS) Strp Inject 1 strip into the skin 2 (two) times daily before meals.    clopidogreL (PLAVIX) 75 mg tablet Take 1 tablet (75 mg total) by mouth once daily.    diphenhydrAMINE (BENADRYL) 25 mg capsule No directions listed on the patients medication list.    furosemide (LASIX) 40 MG tablet Take 20 mg by mouth.    gabapentin (NEURONTIN) 300 MG capsule Take 300 mg by mouth 2 (two) times daily.    glipiZIDE (GLUCOTROL) 10 MG tablet Take 20 mg by mouth 2 (two) times a day.    hydrALAZINE (APRESOLINE) 100 MG tablet Take 1 tablet (100 mg total) by mouth every 8 (eight) hours.    insulin aspart U-100 (NOVOLOG) 100 unit/mL (3 mL) InPn pen Inject 6 Units into the skin 3 (three) times daily with meals.    isosorbide dinitrate (ISORDIL) 10 MG tablet Take 1 tablet (10 mg total) by mouth 3 (three) times daily.    LANTUS SOLOSTAR U-100 INSULIN glargine 100 units/mL SubQ pen Inject 45 Units into the skin every evening. (Patient taking differently: Inject 45 Units into the skin once daily.)    miconazole NITRATE 2 % (MICOTIN) 2 % top powder Apply topically 2 (two) times daily.    MICROLET LANCET Misc     multivitamin (THERAGRAN) per tablet Take 1 tablet by mouth once daily.    NIFEdipine (PROCARDIA-XL) 60 MG (OSM) 24 hr tablet Take 1 tablet (60 mg total) by mouth once daily.    oxyCODONE 5 mg TbOr Take 1 tablet by mouth every 6 (six) hours as needed (Pain (Max 5 daily)).    pantoprazole (PROTONIX) 40 MG tablet Take 40 mg by mouth once daily. Before breakfast    pen needle, diabetic 32 gauge x 5/32" Ndle use as directed with insulin    rosuvastatin (CRESTOR) 40 MG Tab Take 40 mg by mouth once daily.    sodium hypochlorite 0.5 % (DAKIN'S SOLUTION) external solution Apply topically once daily.    tamsulosin (FLOMAX) 0.4 mg Cap Take 0.4 mg by mouth once daily.    " triamcinolone acetonide 0.025% (KENALOG) 0.025 % Oint Apply topically 2 (two) times daily as needed (to affected area).    [DISCONTINUED] hydroCHLOROthiazide (HYDRODIURIL) 25 MG tablet Take 0.5 tablets (12.5 mg total) by mouth every Mon, Wed, Fri. Beginning on 7/4/2022     Family History       Problem Relation (Age of Onset)    Cancer Brother    Diabetes Mother, Sister    Heart disease Mother    Stroke Sister          Tobacco Use    Smoking status: Never    Smokeless tobacco: Never   Substance and Sexual Activity    Alcohol use: No     Comment: occassional    Drug use: No    Sexual activity: Not Currently     Review of Systems   Constitutional:  Positive for activity change, appetite change, chills and fatigue.   Respiratory:  Positive for shortness of breath. Negative for cough.    Cardiovascular:  Positive for leg swelling. Negative for chest pain.   Gastrointestinal:  Negative for abdominal pain, diarrhea, nausea and vomiting.   Genitourinary:  Positive for dysuria. Negative for frequency and urgency.   Neurological:  Positive for weakness. Negative for seizures, light-headedness and numbness.   Psychiatric/Behavioral:  Positive for decreased concentration. Negative for agitation and behavioral problems.      Objective:     Vital Signs (Most Recent):  Temp: 98.4 °F (36.9 °C) (03/05/24 0816)  Pulse: 92 (03/05/24 1247)  Resp: 15 (03/05/24 1247)  BP: (!) 166/72 (03/05/24 1247)  SpO2: 99 % (03/05/24 1247) Vital Signs (24h Range):  Temp:  [98.4 °F (36.9 °C)] 98.4 °F (36.9 °C)  Pulse:  [] 92  Resp:  [14-22] 15  SpO2:  [93 %-99 %] 99 %  BP: (136-182)/(65-78) 166/72     Weight: 120.2 kg (265 lb)  Body mass index is 39.13 kg/m².     Physical Exam  Constitutional:       Appearance: He is obese. He is ill-appearing.      Comments: lethargic   Cardiovascular:      Rate and Rhythm: Regular rhythm. Tachycardia present.      Pulses: Normal pulses.      Heart sounds: Normal heart sounds.   Pulmonary:      Effort: Pulmonary  effort is normal.      Breath sounds: Normal breath sounds.   Musculoskeletal:         General: Swelling present.      Comments: Patient has very dry bark like skin of both legs, left foot amputation, with left leg wounds, he has multiple large wounds with purulent foul smelling abscess and ulceration.   Neurological:      General: No focal deficit present.      Mental Status: He is oriented to person, place, and time.                Significant Labs: All pertinent labs within the past 24 hours have been reviewed.    Significant Imaging: I have reviewed all pertinent imaging results/findings within the past 24 hours.

## 2024-03-05 NOTE — ED PROVIDER NOTES
Encounter Date: 3/5/2024       History     Chief Complaint   Patient presents with    Hyperglycemia     Feeling weakness and has a sugar of > 500, aaox3     73 y.o. male with DM2, HLD, HTN, chronic osteomyelitis of L heel, L TMA, PAD, hx of ESBL klebs, acinetobacter bacteremia, presenting with hyperglycemia.  Patient brought in via EMS.  Patient was living at home with family members per EMS.  States that they had concern for hypoglycemia on their arrival patient was reading higher than 600 on their point of care glucose machine.  Patient states that he had an appointment today that he was hoping to go to with a doctor here at [facility].  Unable to provide much history however states that he has not eaten or taken any medication for a couple of weeks.      The history is provided by the patient and the EMS personnel.     Review of patient's allergies indicates:  No Known Allergies  Past Medical History:   Diagnosis Date    Arthritis     legs    Diabetes mellitus     Diabetes mellitus, type 2     Hyperlipidemia     Hypertension     Osteomyelitis     Palliative care encounter 5/24/2023     Past Surgical History:   Procedure Laterality Date    ANGIOGRAPHY OF LOWER EXTREMITY N/A 2/3/2021    Procedure: Angiogram Extremity Bilateral;  Surgeon: Ernst Chacko MD;  Location: [facility] OR [address] [place] [address];  Service: Peripheral Vascular;  Laterality: N/A;  7.4 mintues fluoroscopy time  816.15 mGy  170.17 Gycm2    AORTOGRAPHY WITH EXTREMITY RUNOFF Bilateral 2/3/2021    Procedure: AORTOGRAM, WITH EXTREMITY RUNOFF;  Surgeon: Ernst Chacko MD;  Location: [facility] OR [address] [place] [address];  Service: Peripheral Vascular;  Laterality: Bilateral;    DEBRIDEMENT OF FOOT Left 2/23/2021    Procedure: DEBRIDEMENT, LEFT HEEL;  Surgeon: Mayra Schroeder DPM;  Location: [facility] OR [address] [place] [address];  Service: Podiatry;  Laterality: Left;    FOOT AMPUTATION  October 2010    left high midfoot amputation    IMPLANTATION OF LEADLESS PACEMAKER N/A 10/12/2023    Procedure:  [id number];  Surgeon: VANESSA Delatorre MD;  Location: [facility] LAB;  Service: Cardiology;  Laterality: N/A;  AVB, MICRA, EH, [place], [place] [id number]     Family History   Problem Relation Age of Onset    Diabetes Mother     Heart disease Mother     Stroke Sister     Cancer Brother     Diabetes Sister      Social History     Tobacco Use    Smoking status: Never    Smokeless tobacco: Never   Substance Use Topics    Alcohol use: No     Comment: occassional    Drug use: No     Review of Systems  ROS negative except as noted in HPI     Physical Exam     Initial Vitals   BP Pulse Resp Temp SpO2   03/05/24 0809 03/05/24 0809 03/05/24 0809 03/05/24 0816 03/05/24 0809   136/78 103 (!) 22 98.4 °F (36.9 °C) (!) 93 %      MAP       --                Physical Exam    Nursing note and vitals reviewed.  Constitutional: He is Obese . He has a sickly appearance. He appears ill.   HENT:   Head: Normocephalic.   Right Ear: External ear normal.   Left Ear: External ear normal.   Mouth/Throat: Mucous membranes are dry.   Eyes: Conjunctivae are normal. Right eye exhibits discharge. Left eye exhibits no discharge.   Neck: No JVD present.   Cardiovascular:  Regular rhythm, S1 normal, S2 normal and normal heart sounds.   Tachycardia present.         Pulmonary/Chest: Breath sounds normal. Tachypnea noted. No respiratory distress.   Abdominal: Abdomen is soft.     Neurological: He is alert. He is disoriented. No cranial nerve deficit or sensory deficit.   Skin: Lesion (Patient has multiple lesions on buttocks, bilateral lower feet wounds including left progressively worsening pressure wound on left amputated.) noted.           ED Course   Critical Care    Date/Time: 3/5/2024 12:13 PM    Performed by: Jeffrey Peoples MD  Authorized by: Jeffrey Peoples MD  Direct patient critical care time: 14 minutes  Additional history critical care time: 4 minutes  Ordering / reviewing critical care time: 9 minutes  Documentation critical care  time: 5 minutes  Consulting other physicians critical care time: 6 minutes  Total critical care time (exclusive of procedural time) : 38 minutes        Labs Reviewed   CBC W/ AUTO DIFFERENTIAL - Abnormal; Notable for the following components:       Result Value    WBC 14.59 (*)     RBC 3.71 (*)     Hemoglobin 9.2 (*)     Hematocrit 31.0 (*)     MCH 24.8 (*)     MCHC 29.7 (*)     RDW 16.6 (*)     Platelets 480 (*)     Gran # (ANC) 13.1 (*)     Immature Grans (Abs) 0.06 (*)     Lymph # 0.6 (*)     Gran % 89.5 (*)     Lymph % 4.2 (*)     All other components within normal limits   COMPREHENSIVE METABOLIC PANEL - Abnormal; Notable for the following components:    Sodium 135 (*)     Potassium 3.4 (*)     Chloride 92 (*)     Glucose 677 (*)     BUN 29 (*)     Creatinine 2.1 (*)     Total Protein 8.5 (*)     Albumin 2.3 (*)     eGFR 32.2 (*)     Anion Gap 17 (*)     All other components within normal limits   URINALYSIS, REFLEX TO URINE CULTURE - Abnormal; Notable for the following components:    Protein, UA Trace (*)     Glucose, UA 4+ (*)     Occult Blood UA 2+ (*)     All other components within normal limits    Narrative:     Specimen Source->Urine   B-TYPE NATRIURETIC PEPTIDE - Abnormal; Notable for the following components:     (*)     All other components within normal limits   TROPONIN I - Abnormal; Notable for the following components:    Troponin I 0.043 (*)     All other components within normal limits   C-REACTIVE PROTEIN - Abnormal; Notable for the following components:    .7 (*)     All other components within normal limits   OSMOLALITY, SERUM - Abnormal; Notable for the following components:    Osmolality 321 (*)     All other components within normal limits    Narrative:        Osmo critical result(s) called and verbal readback obtained from   John Morillo RN by TREY 03/05/2024 09:57   BETA - HYDROXYBUTYRATE, SERUM - Abnormal; Notable for the following components:    Beta-Hydroxybutyrate 1.4  (*)     All other components within normal limits   URINALYSIS MICROSCOPIC - Abnormal; Notable for the following components:    RBC, UA 6 (*)     Bacteria Moderate (*)     Yeast, UA Moderate (*)     All other components within normal limits    Narrative:     Specimen Source->Urine   HEMOGLOBIN A1C - Abnormal; Notable for the following components:    Hemoglobin A1C >14.0 (*)     All other components within normal limits    Narrative:     Add- on GHGB to [id number] per Aguilar Peoples MD on  03/05/2024  11:05     ADD ON PHOS PER BROWN CARLTON PER ANDRES PEOPLES MD ORDER# [id number]   @  03/05/2024  10:26    BASIC METABOLIC PANEL - Abnormal; Notable for the following components:    Potassium 3.4 (*)     Glucose 553 (*)     BUN 26 (*)     Creatinine 1.8 (*)     Calcium 8.6 (*)     eGFR 38.8 (*)     All other components within normal limits   LACTIC ACID, PLASMA - Abnormal; Notable for the following components:    Lactate (Lactic Acid) 3.1 (*)     All other components within normal limits   POCT GLUCOSE - Abnormal; Notable for the following components:    POCT Glucose >500 (*)     All other components within normal limits   ISTAT PROCEDURE - Abnormal; Notable for the following components:    POC PCO2 48.8 (*)     POC PO2 20 (*)     POC HCO3 29.7 (*)     POC BE 5 (*)     POC TCO2 31 (*)     All other components within normal limits   ISTAT LACTATE - Abnormal; Notable for the following components:    POC Lactate 4.61 (*)     All other components within normal limits   ISTAT PROCEDURE - Abnormal; Notable for the following components:    POC Glucose 634 (*)     POC Creatinine 1.9 (*)     POC Sodium 135 (*)     POC Potassium 3.4 (*)     POC Chloride 91 (*)     POC TCO2 (MEASURED) 30 (*)     POC Hematocrit 31 (*)     All other components within normal limits   POCT GLUCOSE - Abnormal; Notable for the following components:    POCT Glucose >500 (*)     All other components within normal limits   POCT GLUCOSE - Abnormal; Notable  for the following components:    POCT Glucose >500 (*)     All other components within normal limits   POCT GLUCOSE - Abnormal; Notable for the following components:    POCT Glucose >500 (*)     All other components within normal limits   CULTURE, BLOOD   CULTURE, BLOOD   MAGNESIUM   PROTIME-INR   LIPASE   PROCALCITONIN   OSMOLALITY, SERUM   PHOSPHORUS    Narrative:     ADD ON PHOS PER BROWN CARLTON PER ANDRES WRIGHT MD ORDER# [id number]   @  03/05/2024  10:26    BASIC METABOLIC PANEL   ISTAT CHEM8   POCT GLUCOSE MONITORING CONTINUOUS        ECG Results              EKG 12-lead (Final result)        Collection Time Result Time QRS Duration OHS QTC Calculation    03/05/24 09:39:39 03/05/24 12:38:52 102 509                     Final result by Interface, Lab In [facility] (03/05/24 12:38:59)                   Narrative:    Test Reason : A41.9,    Vent. Rate : 093 BPM     Atrial Rate : 087 BPM     P-R Int : 000 ms          QRS Dur : 102 ms      QT Int : 410 ms       P-R-T Axes : 000 045 -27 degrees     QTc Int : 509 ms    Atrial fibrillation  Nonspecific T wave abnormality  Abnormal ECG  When compared with ECG of 27-DEC-2023 10:04,  Atrial fibrillation has replaced Electronic ventricular pacemaker  Confirmed by MODESTO GASCA MD ([id number]) on 3/5/2024 12:38:50 PM    Referred By: [email or web address]   SELF           Confirmed By:MODESTO GASCA MD                                  Imaging Results              X-Ray Chest AP Portable (Final result)  Result time 03/05/24 09:57:35      Final result by Maddy Weeks MD (03/05/24 09:57:35)                   Impression:      Mild increased pulmonary vascularity centrally, not significantly changed from the prior study.      Electronically signed by: Maddy Weeks MD  Date:    03/05/2024  Time:    09:57               Narrative:    EXAMINATION:  XR CHEST AP PORTABLE    CLINICAL HISTORY:  Sepsis;    TECHNIQUE:  Single frontal view of the chest was  performed.    COMPARISON:  10/12/2023    FINDINGS:  The cardiac silhouette is midline.  Leadless pacer.    The pulmonary vascularity is increased centrally.    No focal airspace disease.    No pleural effusion.  No pneumothorax.    The osseous structures appear normal.                                       Medications   piperacillin-tazobactam (ZOSYN) 4.5 g in dextrose 5 % in water (D5W) 100 mL IVPB (MB+) (0 g Intravenous Stopped 3/5/24 1052)   dextrose 10% bolus 125 mL 125 mL (has no administration in time range)   dextrose 10% bolus 250 mL 250 mL (has no administration in time range)   dextrose 10% bolus 125 mL 125 mL (has no administration in time range)   dextrose 10% bolus 250 mL 250 mL (has no administration in time range)   sodium chloride 0.9% flush 10 mL (has no administration in time range)   0.9%  NaCl infusion (125 mL/hr Intravenous New Bag 3/5/24 1153)   dextrose 5 % and 0.45 % NaCl infusion (has no administration in time range)   ondansetron injection 4 mg (has no administration in time range)   acetaminophen tablet 650 mg (has no administration in time range)   insulin regular in 0.9 % NaCl 100 unit/100 mL (1 unit/mL) infusion (0.2 Units/kg/hr × 120.2 kg Intravenous Rate/Dose Change 3/5/24 1248)   ondansetron disintegrating tablet 8 mg (has no administration in time range)   dextrose 10% bolus 125 mL 125 mL (has no administration in time range)   dextrose 10% bolus 250 mL 250 mL (has no administration in time range)   sodium chloride 0.9% bolus 2,000 mL 2,000 mL (0 mLs Intravenous Stopped 3/5/24 1152)   vancomycin (VANCOCIN) 2,500 mg in dextrose 5 % (D5W) 500 mL IVPB (0 mg Intravenous Stopped 3/5/24 1341)   potassium chloride 10 mEq in 100 mL IVPB (0 mEq Intravenous Stopped 3/5/24 1247)   potassium chloride SA CR tablet 40 mEq (40 mEq Oral Given 3/5/24 1026)   insulin regular bolus from bag/infusion 12.02 Units 12.02 mL (12.02 Units Intravenous Bolus from Bag 3/5/24 1152)     Medical Decision  Making  73 y.o. male with DM2, HLD, HTN, chronic osteomyelitis of L heel, L TMA, PAD, hx of ESBL klebs, acinetobacter bacteremia, presenting with hyperglycemia.    On initial evaluation patient has mild encephalopathy, he was tachycardic, and tachypneic.  Patient was calm and cooperative.     Initial presentation concerning for severe sepsis, DKA, HHS or other electrolyte disturbance. Concerning for sepsis with source as multiple progressively worsening wounds.  See ED course for further interpretation of workup.  Patient did meet criteria for HHS.  Patient was vitals were improve after IV fluids and insulin patient has been prior to insulin placement.  Hemodynamically stable at this time and will admit to hospital medicine for further management of HHS and sepsis.     Amount and/or Complexity of Data Reviewed  Labs: ordered. Decision-making details documented in ED Course.  Radiology: ordered.    Risk  Prescription drug management.  Decision regarding hospitalization.              Attending Attestation:   Physician Attestation Statement for Resident:  As the supervising MD   Physician Attestation Statement: I have personally seen and examined this patient.   I agree with the above history.  -:   As the supervising MD I agree with the above PE.     As the supervising MD I agree with the above treatment, course, plan, and disposition.   -: This is an emergent evaluation of a critically ill patient.  Upon initial evaluation, a decision was made to treat the patient for sepsis while a workup for sepsis, DKA, and HHNS was initiated.                   ED Course as of 03/05/24 1343   Tue Mar 05, 2024   0949 ISTAT Lactate(!!)  Elevated lactic acidosis is consistent with severe sepsis, already receiving 2 L fluid bolus as well as IV antibiotics [TK]   0949 Beta - Hydroxybutyrate, Serum(!)  Beta hydroxybutyrate mildly elevated [TK]   0949 CBC auto differential(!)  Patient with leukocytosis, anemia that appears at baseline  thrombocytosis with process setting of sepsis versus DKA [TK]   0950 EKG demonstrates atrial fibrillation with a rate of 101, No ST elevations.  There are nonspecific ST changes that are difficult to appreciate given artifact. [TK]   1032 Urinalysis Microscopic(!)  Urine Likely contaminated.  [TK]   1033 Comprehensive metabolic panel(!!)  Hyperglycemia, with Anion gap. Mets criteria for mild DKA. Insulin already ordered and pending administration after potassium replacement   [TK]      ED Course User Index  [TK] Omkar Robledo DO                           Clinical Impression:  Final diagnoses:  [A41.9] Sepsis (Primary)  [E87.20] Lactic acidosis  [E11.00] Hyperosmolar hyperglycemic state (HHS)          ED Disposition Condition    Admit                 Omkar Robledo DO  Resident  03/05/24 1304       Jeffrey Peoples MD  03/05/24 8083

## 2024-03-05 NOTE — ASSESSMENT & PLAN NOTE
Chronic, uncontrolled. Latest blood pressure and vitals reviewed-     Temp:  [98.4 °F (36.9 °C)]   Pulse:  []   Resp:  [14-22]   BP: (136-182)/(65-78)   SpO2:  [93 %-99 %] .   Home meds for hypertension were reviewed and noted below.   Hypertension Medications               furosemide (LASIX) 40 MG tablet Take 20 mg by mouth.    hydrALAZINE (APRESOLINE) 100 MG tablet Take 1 tablet (100 mg total) by mouth every 8 (eight) hours.    isosorbide dinitrate (ISORDIL) 10 MG tablet Take 1 tablet (10 mg total) by mouth 3 (three) times daily.    NIFEdipine (PROCARDIA-XL) 60 MG (OSM) 24 hr tablet Take 1 tablet (60 mg total) by mouth once daily.            While in the hospital, will manage blood pressure as follows; Adjust home antihypertensive regimen as follows- will keep hydralazine but hold valsartan and lasix    Will utilize p.r.n. blood pressure medication only if patient's blood pressure greater than 180/110 and he develops symptoms such as worsening chest pain or shortness of breath.

## 2024-03-05 NOTE — ASSESSMENT & PLAN NOTE
Patient with Persistent (7 days or more) atrial fibrillation which is uncontrolled currently with    . Patient is currently in sinus rhythm.[id number] Score: 4. . Anticoagulation indicated. Anticoagulation done with heparin .

## 2024-03-05 NOTE — ASSESSMENT & PLAN NOTE
Patient has a history of chronic osteomyelitis. On this admission he is coming in with extensive foul smelling ulcerative lesions on his left leg with possible extension into his bone.    -MRI left leg without contrast  -Wound culture  -Inpatient consult to wound care  -Inpatient consult to podiatry  -Continue empiric I/V antibiotics and follow up cultures.  -Inpatient consult to ID

## 2024-03-05 NOTE — H&P
[facility] - Emergency Dept  [facility] Medicine  History & Physical    Patient Name: Saji Castañeda  MRN: [id number]  Patient Class: IP- Inpatient  Admission Date: 3/5/2024  Attending Physician: Jm Rosa MD   Primary Care Provider: [facility] -         Patient information was obtained from patient and ER records.     Subjective:     Principal Problem:DKA (diabetic ketoacidosis)    Chief Complaint:   Chief Complaint   Patient presents with    Hyperglycemia     Feeling weakness and has a sugar of > 500, aaox3        HPI: Saji Castañeda is a 74 y.o. male with a medical history of DM2, HLD, HTN, chronic osteomyelitis of L heel, L TMA, PAD, hx of ESBL klebsiella, acinetobacter bacteremia, presenting with hyperglycemia. He presented to the ED via EMS and his POCT glucose on arrival was >500. Serum blood glucose 667 with anion gap of 17 and elevated ketones. Serum potassium 3.4, corrected sodium 149. Urinalysis significant for RBCs, glucosuria, and moderate bacteria and yeast. CBC revealed leukocytosis, elevated platelets, and mild anemia. CMP shows Na 135, K 3.4, BUN 29, Cr 2.1, Glucose 677, Lactate 4.61. BNP and troponin elevated. GFR 32.2. Insulin drip, IV fluids, zosyn, vancomycin, and potassium given.      He is unable to provide much history, but states that he has not been taking his home medications for at least a week. He reports shortness of breath, fatigue, and weakness for the last 6-7 days. He has chills and reports episodes of emesis. He denies abdominal pain, chest pain, palpitations, recent illness/infection, fevers, changes to bowel movements and urinary output. Patient lives with his brother and sister at home. He was last seen in the ED on 2/21 after a fall. He was hypoglycemic BGL 60 at that time which returned to normal after eating. He was also scheduled for a wound clinic appointment today 3/5 at [facility] with Dr. Wilson.        Past Medical History:   Diagnosis Date    Arthritis      "legs    Diabetes mellitus     Diabetes mellitus, type 2     Hyperlipidemia     Hypertension     Osteomyelitis     Palliative care encounter 5/24/2023       Past Surgical History:   Procedure Laterality Date    ANGIOGRAPHY OF LOWER EXTREMITY N/A 2/3/2021    Procedure: Angiogram Extremity Bilateral;  Surgeon: Ernst Chacko MD;  Location: [facility] OR [address] [place] [address];  Service: Peripheral Vascular;  Laterality: N/A;  7.4 mintues fluoroscopy time  816.15 mGy  170.17 Gycm2    AORTOGRAPHY WITH EXTREMITY RUNOFF Bilateral 2/3/2021    Procedure: AORTOGRAM, WITH EXTREMITY RUNOFF;  Surgeon: Ernst Chacko MD;  Location: [facility] OR [address] [place] [address];  Service: Peripheral Vascular;  Laterality: Bilateral;    DEBRIDEMENT OF FOOT Left 2/23/2021    Procedure: DEBRIDEMENT, LEFT HEEL;  Surgeon: Mayra Schroeder DPM;  Location: [facility] OR [address] [place] [address];  Service: Podiatry;  Laterality: Left;    FOOT AMPUTATION  October 2010    left high midfoot amputation    IMPLANTATION OF LEADLESS PACEMAKER N/A 10/12/2023    Procedure: [id number];  Surgeon: VANESSA Delatorre MD;  Location: [facility] EP LAB;  Service: Cardiology;  Laterality: N/A;  AVB, MICRA, EH, [place]ES, [place] [id number]       Review of patient's allergies indicates:  No Known Allergies    No current facility-administered medications on file prior to encounter.     Current Outpatient Medications on File Prior to Encounter   Medication Sig    acetaminophen (TYLENOL) 325 MG tablet Take 650 mg by mouth every 6 (six) hours as needed for Temperature greater than.    acidophilus-pectin, citrus 100 million cell-10 mg Cap Take 1 capsule by mouth 2 (two) times a day.    apixaban (ELIQUIS) 5 mg Tab Take 1 tablet (5 mg total) by mouth 2 (two) times daily.    ascorbic acid, vitamin C, (VITAMIN C) 500 MG tablet Take 500 mg by mouth 2 (two) times daily.    B COMPLEX-VITAMIN B12 tablet Take 1 tablet by mouth once daily.    BD AUTOSHIELD DUO PEN NEEDLE 30 gauge x 3/16" Ndle     BD ULTRA-FINE ADITYA PEN NEEDLE " "32 gauge x 5/32" Ndle USE FOUR TIMES DAILY WITH MEALS AND NIGHTLY    blood sugar diagnostic (CONTOUR TEST STRIPS) Strp Inject 1 strip into the skin 2 (two) times daily before meals.    clopidogreL (PLAVIX) 75 mg tablet Take 1 tablet (75 mg total) by mouth once daily.    diphenhydrAMINE (BENADRYL) 25 mg capsule No directions listed on the patients medication list.    furosemide (LASIX) 40 MG tablet Take 20 mg by mouth.    gabapentin (NEURONTIN) 300 MG capsule Take 300 mg by mouth 2 (two) times daily.    glipiZIDE (GLUCOTROL) 10 MG tablet Take 20 mg by mouth 2 (two) times a day.    hydrALAZINE (APRESOLINE) 100 MG tablet Take 1 tablet (100 mg total) by mouth every 8 (eight) hours.    insulin aspart U-100 (NOVOLOG) 100 unit/mL (3 mL) InPn pen Inject 6 Units into the skin 3 (three) times daily with meals.    isosorbide dinitrate (ISORDIL) 10 MG tablet Take 1 tablet (10 mg total) by mouth 3 (three) times daily.    LANTUS SOLOSTAR U-100 INSULIN glargine 100 units/mL SubQ pen Inject 45 Units into the skin every evening. (Patient taking differently: Inject 45 Units into the skin once daily.)    miconazole NITRATE 2 % (MICOTIN) 2 % top powder Apply topically 2 (two) times daily.    MICROLET LANCET Misc     multivitamin (THERAGRAN) per tablet Take 1 tablet by mouth once daily.    NIFEdipine (PROCARDIA-XL) 60 MG (OSM) 24 hr tablet Take 1 tablet (60 mg total) by mouth once daily.    oxyCODONE 5 mg TbOr Take 1 tablet by mouth every 6 (six) hours as needed (Pain (Max 5 daily)).    pantoprazole (PROTONIX) 40 MG tablet Take 40 mg by mouth once daily. Before breakfast    pen needle, diabetic 32 gauge x 5/32" Ndle use as directed with insulin    rosuvastatin (CRESTOR) 40 MG Tab Take 40 mg by mouth once daily.    sodium hypochlorite 0.5 % (DAKIN'S SOLUTION) external solution Apply topically once daily.    tamsulosin (FLOMAX) 0.4 mg Cap Take 0.4 mg by mouth once daily.    triamcinolone acetonide 0.025% (KENALOG) 0.025 % Oint Apply " topically 2 (two) times daily as needed (to affected area).    [DISCONTINUED] hydroCHLOROthiazide (HYDRODIURIL) 25 MG tablet Take 0.5 tablets (12.5 mg total) by mouth every Mon, Wed, Fri. Beginning on 7/4/2022     Family History       Problem Relation (Age of Onset)    Cancer Brother    Diabetes Mother, Sister    Heart disease Mother    Stroke Sister          Tobacco Use    Smoking status: Never    Smokeless tobacco: Never   Substance and Sexual Activity    Alcohol use: No     Comment: occassional    Drug use: No    Sexual activity: Not Currently     Review of Systems   Constitutional:  Positive for activity change, appetite change, chills and fatigue.   Respiratory:  Positive for shortness of breath. Negative for cough.    Cardiovascular:  Positive for leg swelling. Negative for chest pain.   Gastrointestinal:  Negative for abdominal pain, diarrhea, nausea and vomiting.   Genitourinary:  Positive for dysuria. Negative for frequency and urgency.   Neurological:  Positive for weakness. Negative for seizures, light-headedness and numbness.   Psychiatric/Behavioral:  Positive for decreased concentration. Negative for agitation and behavioral problems.      Objective:     Vital Signs (Most Recent):  Temp: 98.4 °F (36.9 °C) (03/05/24 0816)  Pulse: 92 (03/05/24 1247)  Resp: 15 (03/05/24 1247)  BP: (!) 166/72 (03/05/24 1247)  SpO2: 99 % (03/05/24 1247) Vital Signs (24h Range):  Temp:  [98.4 °F (36.9 °C)] 98.4 °F (36.9 °C)  Pulse:  [] 92  Resp:  [14-22] 15  SpO2:  [93 %-99 %] 99 %  BP: (136-182)/(65-78) 166/72     Weight: 120.2 kg (265 lb)  Body mass index is 39.13 kg/m².     Physical Exam  Constitutional:       Appearance: He is obese. He is ill-appearing.      Comments: lethargic   Cardiovascular:      Rate and Rhythm: Regular rhythm. Tachycardia present.      Pulses: Normal pulses.      Heart sounds: Normal heart sounds.   Pulmonary:      Effort: Pulmonary effort is normal.      Breath sounds: Normal breath sounds.    Musculoskeletal:         General: Swelling present.      Comments: Patient has very dry bark like skin of both legs, left foot amputation, with left leg wounds, he has multiple large wounds with purulent foul smelling abscess and ulceration.   Neurological:      General: No focal deficit present.      Mental Status: He is oriented to person, place, and time.                Significant Labs: All pertinent labs within the past 24 hours have been reviewed.    Significant Imaging: I have reviewed all pertinent imaging results/findings within the past 24 hours.  Assessment/Plan:     * DKA (diabetic ketoacidosis)  Patient's FSGs are uncontrolled due to hyperglycemia on current medication regimen.  Last A1c reviewed-   Lab Results   Component Value Date    HGBA1C >14.0 (H) 03/05/2024     Most recent fingerstick glucose reviewed-   Recent Labs   Lab 03/05/24  1246 03/05/24  1345 03/05/24  1441 03/05/24  1546   POCTGLUCOSE >500* 412* 333* 139*     Current correctional scale   Insulin drip  Maintain anti-hyperglycemic dose as follows-   Antihyperglycemics (From admission, onward)      Start     Stop Route Frequency Ordered    03/05/24 1145  insulin regular in 0.9 % NaCl 100 unit/100 mL (1 unit/mL) infusion        Question Answer Comment   Initial dose (DO NOT CHANGE): 0.1 units/kg/hr    Insulin Rate Adjustment (DO NOT MODIFY ANSWER) [email or web address]\[email or web address]\Images\Pharmacy\InsulinInfusions\INSULIN ADJUSTMENT DKA version [id number].pdf        -- IV Continuous 03/05/24 1145          Hold Oral hypoglycemics while patient is in the hospital.  Will keep patient on DKA pathway with insulin drip and fluid with protocol in place for switching to subcutaneous insulin as anion gap closes.      DKA  HHS  - DKA/HHS Pathway initiated  - Blood sugar on arrival 677 with a bicarb 29.7, anion gap 14, BHB 1.4 and pH 7.3  - HbA1c 14  - Likely induced by lack of medication and/or infection  - Home DM regimen:  Insulin and Glipizide  - Start insulin gtt  per protocol with q1h accuchecks while on the drip.    - Once Bicarb >18, Anion gap <10, Glucose <200 on 2 readings and able to tolerate PO intake without nausea and vomiting, transition to subq insulin with a 1-2 h overlap with drip.    Long acting insulin dose:  Detemir (0.25mg/kg) daily or in 2 doses  Short acting insulin dose:  Aspart (0.08mg/kg) units per meal  - Start normal saline at 125cc/hr.  Once blood sugar is 200, change IVF to D5 1/2 NS at 50cc/h  - Monitor electrolytes with BMP q4h while on insulin gtt and place on tele  - Bariatric clear liquid diet while on insulin gtt then change to Diabetic diet when initiating subq insulin with accuchecks 4x daily before meals and at bedtime (AC and HS)  - Endocrine consult         CKD (chronic kidney disease) stage 3, GFR 30-59 ml/min  Creatine stable for now. BMP reviewed- noted Estimated Creatinine Clearance: 45.4 mL/min (A) (based on SCr of 1.8 mg/dL (H)). according to latest data. Based on current GFR, CKD stage is stage 3 - GFR 30-59.  Monitor UOP and serial BMP and adjust therapy as needed. Renally dose meds. Avoid nephrotoxic medications and procedures.    Osteomyelitis  Patient has a history of chronic osteomyelitis. On this admission he is coming in with extensive foul smelling ulcerative lesions on his left leg with possible extension into his bone.    -MRI left leg without contrast  -Wound culture  -Inpatient consult to wound care  -Inpatient consult to podiatry  -Continue empiric I/V antibiotics and follow up cultures.  -Inpatient consult to ID      Severe sepsis  This patient does have evidence of infective focus  My overall impression is sepsis.  Source: Skin and Soft Tissue (location left leg)  Antibiotics given-   Antibiotics (72h ago, onward)      Start     Stop Route Frequency Ordered    03/05/24 9653  vancomycin - pharmacy to dose  (vancomycin IVPB (PEDS and ADULTS))        See Hyperspace for full Linked Orders Report.    -- IV pharmacy to  manage frequency 03/05/24 1656    03/05/24 1730  meropenem (MERREM) 2 g in sodium chloride 0.9% 100 mL IVPB         -- IV Every 12 hours (non-standard times) 03/05/24 1618          Latest lactate reviewed-  Recent Labs   Lab 03/05/24  0909 03/05/24  1223   LACTATE  --  3.1*   POCLAC 4.61*  --      Organ dysfunction indicated by Acute kidney injury    Fluid challenge Actual Body weight- Patient will receive 30ml/kg actual body weight to calculate fluid bolus for treatment of septic shock.     Post- resuscitation assessment No - Post resuscitation assessment not needed       Will Not start Pressors- Levophed for MAP of 65  -Trend lactate  -Inpatient consult to ID  -Continue IV antibiotics  -Follow up blood cultures      Elevated troponin  Patient had elevated troponin at presentation 0.043, will continue to trend.      Hyperlipidemia  Continue home atorvastatin      Paroxysmal atrial fibrillation  Patient with Persistent (7 days or more) atrial fibrillation which is uncontrolled currently with    . Patient is currently in sinus rhythm.[id number] Score: 4. . Anticoagulation indicated. Anticoagulation done with heparin .    Peripheral arterial disease  Hold plavix in the setting of sepsis      Essential hypertension  Chronic, uncontrolled. Latest blood pressure and vitals reviewed-     Temp:  [98.4 °F (36.9 °C)]   Pulse:  []   Resp:  [14-22]   BP: (136-182)/(65-78)   SpO2:  [93 %-99 %] .   Home meds for hypertension were reviewed and noted below.   Hypertension Medications               furosemide (LASIX) 40 MG tablet Take 20 mg by mouth.    hydrALAZINE (APRESOLINE) 100 MG tablet Take 1 tablet (100 mg total) by mouth every 8 (eight) hours.    isosorbide dinitrate (ISORDIL) 10 MG tablet Take 1 tablet (10 mg total) by mouth 3 (three) times daily.    NIFEdipine (PROCARDIA-XL) 60 MG (OSM) 24 hr tablet Take 1 tablet (60 mg total) by mouth once daily.            While in the hospital, will manage blood pressure as  follows; Adjust home antihypertensive regimen as follows- will keep hydralazine but hold valsartan and lasix    Will utilize p.r.n. blood pressure medication only if patient's blood pressure greater than 180/110 and he develops symptoms such as worsening chest pain or shortness of breath.      VTE Risk Mitigation (From admission, onward)           Ordered     heparin 25,000 units in dextrose 5% (100 units/ml) IV bolus from bag LOW INTENSITY nomogram - [facility]  Once        Question:  Heparin Infusion Adjustment (DO NOT MODIFY ANSWER)  Answer:  [email or web address]\epic\Images\Pharmacy\HeparinInfusions\heparin LOW INTENSITY nomogram for [facility] [id number]    03/05/24 1718     heparin 25,000 units in dextrose 5% 250 mL (100 units/mL) infusion LOW INTENSITY nomogram - [facility]  Continuous        Question:  Begin at (units/kg/hr)  Answer:  12    03/05/24 1718     IP VTE HIGH RISK PATIENT  Once         03/05/24 1145     Place NADYA hose  Until discontinued         03/05/24 1145     Place sequential compression device  Until discontinued         03/05/24 1145                                    Lynette Perkins MD  Department of Hospital Medicine  [facility] - Emergency Dept

## 2024-03-05 NOTE — PROGRESS NOTES
"Pharmacokinetic Initial Assessment & Plan: IV Vancomycin    IV Vancomycin  2500 mg x once LD in ED 03/05/2024 @1052  YNES present-subsequent doses based on random vancomycin levels  Draw vancomycin random level on 03/06/2024 @0400.  Desired empiric serum trough concentration is 15 to 20 mcg/mL    Pharmacy will continue to follow and monitor vancomycin.    [id number] with questions regarding this assessment.     Thank you for the consult,   Deb Perkins       Patient brief summary:  Saji Castañeda is a 74 y.o. male initiated on antimicrobial therapy with IV Vancomycin for treatment of suspected bacteremia,       Drug Allergies:   Review of patient's allergies indicates:  No Known Allergies    Actual Body Weight:   120.2 kg    Renal Function:   Estimated Creatinine Clearance: 45.4 mL/min (A) (based on SCr of 1.8 mg/dL (H)).,         CBC (last 72 hours):  Recent Labs   Lab Result Units 03/05/24  0900   WBC K/uL 14.59*   Hemoglobin g/dL 9.2*   Hemoglobin A1C % >14.0*   Hematocrit % 31.0*   Platelets K/uL 480*   Gran % % 89.5*   Lymph % % 4.2*   Mono % % 5.7   Eosinophil % % 0.1   Basophil % % 0.1   Differential Method  Automated       Metabolic Panel (last 72 hours):  Recent Labs   Lab Result Units 03/05/24  0900 03/05/24  0931 03/05/24  1223   Sodium mmol/L 135*  --  136   Potassium mmol/L 3.4*  --  3.4*   Chloride mmol/L 92*  --  99   CO2 mmol/L 26  --  23   Glucose mg/dL 677*  --  553*   Glucose, UA   --  4+*  --    BUN mg/dL 29*  --  26*   Creatinine mg/dL 2.1*  --  1.8*   Albumin g/dL 2.3*  --   --    Total Bilirubin mg/dL 0.6  --   --    Alkaline Phosphatase U/L 81  --   --    AST U/L 40  --   --    ALT U/L 19  --   --    Magnesium mg/dL 2.2  --   --    Phosphorus mg/dL 2.8  --   --        Drug levels (last 3 results):  No results for input(s): "VANCOMYCINRA", "VANCORANDOM", "VANCOMYCINPE", "VANCOPEAK", "VANCOMYCINTR", "VANCOTROUGH" in the last 72 hours.    Microbiologic Results:  Microbiology Results (last 7 days)  "      Procedure Component Value Units Date/Time    Blood culture x two cultures. Draw prior to antibiotics. [[id number]] Collected: 03/05/24 0900    Order Status: Completed Specimen: Blood from Peripheral, Forearm, Right Updated: 03/05/24 1545     Blood Culture, Routine No Growth to date    Narrative:      Aerobic and anaerobic    Blood culture x two cultures. Draw prior to antibiotics. [[id number]] Collected: 03/05/24 0900    Order Status: Completed Specimen: Blood from Peripheral, Hand, Left Updated: 03/05/24 1545     Blood Culture, Routine No Growth to date    Narrative:      Aerobic and anaerobic    Aerobic culture [[id number]]     Order Status: No result Specimen: Wound

## 2024-03-05 NOTE — ASSESSMENT & PLAN NOTE
This patient does have evidence of infective focus  My overall impression is sepsis.  Source: Skin and Soft Tissue (location left leg)  Antibiotics given-   Antibiotics (72h ago, onward)      Start     Stop Route Frequency Ordered    03/05/24 1756  vancomycin - pharmacy to dose  (vancomycin IVPB (PEDS and ADULTS))        See Ham for full Linked Orders Report.    -- IV pharmacy to manage frequency 03/05/24 1656    03/05/24 1730  meropenem (MERREM) 2 g in sodium chloride 0.9% 100 mL IVPB         -- IV Every 12 hours (non-standard times) 03/05/24 1618          Latest lactate reviewed-  Recent Labs   Lab 03/05/24  0909 03/05/24  1223   LACTATE  --  3.1*   POCLAC 4.61*  --      Organ dysfunction indicated by Acute kidney injury    Fluid challenge Actual Body weight- Patient will receive 30ml/kg actual body weight to calculate fluid bolus for treatment of septic shock.     Post- resuscitation assessment No - Post resuscitation assessment not needed       Will Not start Pressors- Levophed for MAP of 65  -Trend lactate  -Inpatient consult to ID  -Continue IV antibiotics  -Follow up blood cultures

## 2024-03-05 NOTE — ASSESSMENT & PLAN NOTE
Creatine stable for now. BMP reviewed- noted Estimated Creatinine Clearance: 45.4 mL/min (A) (based on SCr of 1.8 mg/dL (H)). according to latest data. Based on current GFR, CKD stage is stage 3 - GFR 30-59.  Monitor UOP and serial BMP and adjust therapy as needed. Renally dose meds. Avoid nephrotoxic medications and procedures.

## 2024-03-05 NOTE — ASSESSMENT & PLAN NOTE
Patient's FSGs are uncontrolled due to hyperglycemia on current medication regimen.  Last A1c reviewed-   Lab Results   Component Value Date    HGBA1C >14.0 (H) 03/05/2024     Most recent fingerstick glucose reviewed-   Recent Labs   Lab 03/05/24  1246 03/05/24  1345 03/05/24  1441 03/05/24  1546   POCTGLUCOSE >500* 412* 333* 139*     Current correctional scale   Insulin drip  Maintain anti-hyperglycemic dose as follows-   Antihyperglycemics (From admission, onward)      Start     Stop Route Frequency Ordered    03/05/24 1145  insulin regular in 0.9 % NaCl 100 unit/100 mL (1 unit/mL) infusion        Question Answer Comment   Initial dose (DO NOT CHANGE): 0.1 units/kg/hr    Insulin Rate Adjustment (DO NOT MODIFY ANSWER) \\[email or web address]\epic\Images\Pharmacy\InsulinInfusions\INSULIN ADJUSTMENT DKA version [id number].pdf        -- IV Continuous 03/05/24 1145          Hold Oral hypoglycemics while patient is in the hospital.  Will keep patient on DKA pathway with insulin drip and fluid with protocol in place for switching to subcutaneous insulin as anion gap closes.      DKA  HHS  - DKA/HHS Pathway initiated  - Blood sugar on arrival 677 with a bicarb 29.7, anion gap 14, BHB 1.4 and pH 7.3  - HbA1c 14  - Likely induced by lack of medication and/or infection  - Home DM regimen:  Insulin and Glipizide  - Start insulin gtt per protocol with q1h accuchecks while on the drip.    - Once Bicarb >18, Anion gap <10, Glucose <200 on 2 readings and able to tolerate PO intake without nausea and vomiting, transition to subq insulin with a 1-2 h overlap with drip.    Long acting insulin dose:  Detemir (0.25mg/kg) daily or in 2 doses  Short acting insulin dose:  Aspart (0.08mg/kg) units per meal  - Start normal saline at 125cc/hr.  Once blood sugar is 200, change IVF to D5 1/2 NS at 50cc/h  - Monitor electrolytes with BMP q4h while on insulin gtt and place on tele  - Bariatric clear liquid diet while on insulin gtt then change to Diabetic  diet when initiating subq insulin with accuchecks 4x daily before meals and at bedtime (AC and HS)  - Endocrine consult

## 2024-03-06 LAB
ALBUMIN SERPL BCP-MCNC: 1.9 G/DL (ref 3.5–5.2)
ALP SERPL-CCNC: 69 U/L (ref 55–135)
ALT SERPL W/O P-5'-P-CCNC: 21 U/L (ref 10–44)
ANION GAP SERPL CALC-SCNC: 10 MMOL/L (ref 8–16)
ANION GAP SERPL CALC-SCNC: 11 MMOL/L (ref 8–16)
ANION GAP SERPL CALC-SCNC: 12 MMOL/L (ref 8–16)
ANION GAP SERPL CALC-SCNC: 12 MMOL/L (ref 8–16)
ANION GAP SERPL CALC-SCNC: 7 MMOL/L (ref 8–16)
ANION GAP SERPL CALC-SCNC: 8 MMOL/L (ref 8–16)
AST SERPL-CCNC: 65 U/L (ref 10–40)
BASOPHILS # BLD AUTO: 0.04 K/UL (ref 0–0.2)
BASOPHILS NFR BLD: 0.4 % (ref 0–1.9)
BILIRUB SERPL-MCNC: 0.6 MG/DL (ref 0.1–1)
BUN SERPL-MCNC: 21 MG/DL (ref 8–23)
BUN SERPL-MCNC: 23 MG/DL (ref 8–23)
BUN SERPL-MCNC: 24 MG/DL (ref 8–23)
BUN SERPL-MCNC: 25 MG/DL (ref 8–23)
CALCIUM SERPL-MCNC: 8.5 MG/DL (ref 8.7–10.5)
CALCIUM SERPL-MCNC: 8.6 MG/DL (ref 8.7–10.5)
CALCIUM SERPL-MCNC: 8.7 MG/DL (ref 8.7–10.5)
CALCIUM SERPL-MCNC: 9 MG/DL (ref 8.7–10.5)
CHLORIDE SERPL-SCNC: 104 MMOL/L (ref 95–110)
CHLORIDE SERPL-SCNC: 105 MMOL/L (ref 95–110)
CHLORIDE SERPL-SCNC: 106 MMOL/L (ref 95–110)
CHLORIDE SERPL-SCNC: 107 MMOL/L (ref 95–110)
CO2 SERPL-SCNC: 24 MMOL/L (ref 23–29)
CO2 SERPL-SCNC: 26 MMOL/L (ref 23–29)
CO2 SERPL-SCNC: 28 MMOL/L (ref 23–29)
CO2 SERPL-SCNC: 29 MMOL/L (ref 23–29)
CREAT SERPL-MCNC: 1.5 MG/DL (ref 0.5–1.4)
CREAT SERPL-MCNC: 1.6 MG/DL (ref 0.5–1.4)
CREAT SERPL-MCNC: 1.7 MG/DL (ref 0.5–1.4)
CREAT SERPL-MCNC: 1.7 MG/DL (ref 0.5–1.4)
DIFFERENTIAL METHOD BLD: ABNORMAL
EOSINOPHIL # BLD AUTO: 0.2 K/UL (ref 0–0.5)
EOSINOPHIL NFR BLD: 1.4 % (ref 0–8)
ERYTHROCYTE [DISTWIDTH] IN BLOOD BY AUTOMATED COUNT: 16.7 % (ref 11.5–14.5)
EST. GFR  (NO RACE VARIABLE): 41.5 ML/MIN/1.73 M^2
EST. GFR  (NO RACE VARIABLE): 41.5 ML/MIN/1.73 M^2
EST. GFR  (NO RACE VARIABLE): 44.7 ML/MIN/1.73 M^2
EST. GFR  (NO RACE VARIABLE): 48.2 ML/MIN/1.73 M^2
GLUCOSE SERPL-MCNC: 123 MG/DL (ref 70–110)
GLUCOSE SERPL-MCNC: 145 MG/DL (ref 70–110)
GLUCOSE SERPL-MCNC: 173 MG/DL (ref 70–110)
GLUCOSE SERPL-MCNC: 173 MG/DL (ref 70–110)
GLUCOSE SERPL-MCNC: 179 MG/DL (ref 70–110)
GLUCOSE SERPL-MCNC: 189 MG/DL (ref 70–110)
GRAM STN SPEC: NORMAL
HCT VFR BLD AUTO: 32.4 % (ref 40–54)
HGB BLD-MCNC: 9.7 G/DL (ref 14–18)
IMM GRANULOCYTES # BLD AUTO: 0.03 K/UL (ref 0–0.04)
IMM GRANULOCYTES NFR BLD AUTO: 0.3 % (ref 0–0.5)
LACTATE SERPL-SCNC: 2.1 MMOL/L (ref 0.5–2.2)
LACTATE SERPL-SCNC: 2.2 MMOL/L (ref 0.5–2.2)
LYMPHOCYTES # BLD AUTO: 0.9 K/UL (ref 1–4.8)
LYMPHOCYTES NFR BLD: 8 % (ref 18–48)
MAGNESIUM SERPL-MCNC: 2.1 MG/DL (ref 1.6–2.6)
MCH RBC QN AUTO: 25 PG (ref 27–31)
MCHC RBC AUTO-ENTMCNC: 29.9 G/DL (ref 32–36)
MCV RBC AUTO: 84 FL (ref 82–98)
MONOCYTES # BLD AUTO: 0.7 K/UL (ref 0.3–1)
MONOCYTES NFR BLD: 6.8 % (ref 4–15)
NEUTROPHILS # BLD AUTO: 8.9 K/UL (ref 1.8–7.7)
NEUTROPHILS NFR BLD: 83.1 % (ref 38–73)
NRBC BLD-RTO: 0 /100 WBC
PHOSPHATE SERPL-MCNC: 1.8 MG/DL (ref 2.7–4.5)
PLATELET # BLD AUTO: 327 K/UL (ref 150–450)
PMV BLD AUTO: 9.9 FL (ref 9.2–12.9)
POCT GLUCOSE: 128 MG/DL (ref 70–110)
POCT GLUCOSE: 140 MG/DL (ref 70–110)
POCT GLUCOSE: 162 MG/DL (ref 70–110)
POCT GLUCOSE: 168 MG/DL (ref 70–110)
POCT GLUCOSE: 170 MG/DL (ref 70–110)
POCT GLUCOSE: 173 MG/DL (ref 70–110)
POCT GLUCOSE: 181 MG/DL (ref 70–110)
POCT GLUCOSE: 182 MG/DL (ref 70–110)
POCT GLUCOSE: 184 MG/DL (ref 70–110)
POCT GLUCOSE: 188 MG/DL (ref 70–110)
POCT GLUCOSE: 193 MG/DL (ref 70–110)
POCT GLUCOSE: 204 MG/DL (ref 70–110)
POCT GLUCOSE: 207 MG/DL (ref 70–110)
POCT GLUCOSE: 208 MG/DL (ref 70–110)
POCT GLUCOSE: 217 MG/DL (ref 70–110)
POCT GLUCOSE: 233 MG/DL (ref 70–110)
POTASSIUM SERPL-SCNC: 3.2 MMOL/L (ref 3.5–5.1)
POTASSIUM SERPL-SCNC: 3.3 MMOL/L (ref 3.5–5.1)
POTASSIUM SERPL-SCNC: 3.9 MMOL/L (ref 3.5–5.1)
POTASSIUM SERPL-SCNC: 3.9 MMOL/L (ref 3.5–5.1)
POTASSIUM SERPL-SCNC: 4.3 MMOL/L (ref 3.5–5.1)
POTASSIUM SERPL-SCNC: 4.5 MMOL/L (ref 3.5–5.1)
PROT SERPL-MCNC: 7.2 G/DL (ref 6–8.4)
RBC # BLD AUTO: 3.88 M/UL (ref 4.6–6.2)
SODIUM SERPL-SCNC: 139 MMOL/L (ref 136–145)
SODIUM SERPL-SCNC: 141 MMOL/L (ref 136–145)
SODIUM SERPL-SCNC: 142 MMOL/L (ref 136–145)
SODIUM SERPL-SCNC: 143 MMOL/L (ref 136–145)
VANCOMYCIN SERPL-MCNC: 16.5 UG/ML
WBC # BLD AUTO: 10.65 K/UL (ref 3.9–12.7)

## 2024-03-06 PROCEDURE — 63600175 PHARM REV CODE 636 W HCPCS

## 2024-03-06 PROCEDURE — 87070 CULTURE OTHR SPECIMN AEROBIC: CPT

## 2024-03-06 PROCEDURE — 87077 CULTURE AEROBIC IDENTIFY: CPT

## 2024-03-06 PROCEDURE — 36415 COLL VENOUS BLD VENIPUNCTURE: CPT | Mod: XB

## 2024-03-06 PROCEDURE — 87106 FUNGI IDENTIFICATION YEAST: CPT

## 2024-03-06 PROCEDURE — 99291 CRITICAL CARE FIRST HOUR: CPT

## 2024-03-06 PROCEDURE — 97112 NEUROMUSCULAR REEDUCATION: CPT

## 2024-03-06 PROCEDURE — 87102 FUNGUS ISOLATION CULTURE: CPT

## 2024-03-06 PROCEDURE — 87075 CULTR BACTERIA EXCEPT BLOOD: CPT

## 2024-03-06 PROCEDURE — 80053 COMPREHEN METABOLIC PANEL: CPT

## 2024-03-06 PROCEDURE — 25000003 PHARM REV CODE 250

## 2024-03-06 PROCEDURE — 83605 ASSAY OF LACTIC ACID: CPT

## 2024-03-06 PROCEDURE — 84100 ASSAY OF PHOSPHORUS: CPT

## 2024-03-06 PROCEDURE — 25000003 PHARM REV CODE 250: Performed by: PHYSICIAN ASSISTANT

## 2024-03-06 PROCEDURE — 85025 COMPLETE CBC W/AUTO DIFF WBC: CPT

## 2024-03-06 PROCEDURE — 97535 SELF CARE MNGMENT TRAINING: CPT

## 2024-03-06 PROCEDURE — 87186 SC STD MICRODIL/AGAR DIL: CPT | Mod: 59

## 2024-03-06 PROCEDURE — 80048 BASIC METABOLIC PNL TOTAL CA: CPT | Mod: 91,XB

## 2024-03-06 PROCEDURE — 97162 PT EVAL MOD COMPLEX 30 MIN: CPT

## 2024-03-06 PROCEDURE — 83605 ASSAY OF LACTIC ACID: CPT | Mod: 91

## 2024-03-06 PROCEDURE — 83735 ASSAY OF MAGNESIUM: CPT

## 2024-03-06 PROCEDURE — 99223 1ST HOSP IP/OBS HIGH 75: CPT | Mod: ,,, | Performed by: PHYSICIAN ASSISTANT

## 2024-03-06 PROCEDURE — 87206 SMEAR FLUORESCENT/ACID STAI: CPT

## 2024-03-06 PROCEDURE — 87107 FUNGI IDENTIFICATION MOLD: CPT

## 2024-03-06 PROCEDURE — 97530 THERAPEUTIC ACTIVITIES: CPT

## 2024-03-06 PROCEDURE — 87116 MYCOBACTERIA CULTURE: CPT

## 2024-03-06 PROCEDURE — 87205 SMEAR GRAM STAIN: CPT

## 2024-03-06 PROCEDURE — 99223 1ST HOSP IP/OBS HIGH 75: CPT | Mod: ,,, | Performed by: PODIATRIST

## 2024-03-06 PROCEDURE — 27000207 HC ISOLATION

## 2024-03-06 PROCEDURE — A4216 STERILE WATER/SALINE, 10 ML: HCPCS

## 2024-03-06 PROCEDURE — 80048 BASIC METABOLIC PNL TOTAL CA: CPT | Performed by: STUDENT IN AN ORGANIZED HEALTH CARE EDUCATION/TRAINING PROGRAM

## 2024-03-06 PROCEDURE — 80202 ASSAY OF VANCOMYCIN: CPT

## 2024-03-06 PROCEDURE — 87015 SPECIMEN INFECT AGNT CONCNTJ: CPT

## 2024-03-06 PROCEDURE — 97166 OT EVAL MOD COMPLEX 45 MIN: CPT

## 2024-03-06 PROCEDURE — 20600001 HC STEP DOWN PRIVATE ROOM

## 2024-03-06 RX ORDER — POTASSIUM CHLORIDE 20 MEQ/1
40 TABLET, EXTENDED RELEASE ORAL ONCE
Status: COMPLETED | OUTPATIENT
Start: 2024-03-06 | End: 2024-03-06

## 2024-03-06 RX ORDER — ONDANSETRON HYDROCHLORIDE 2 MG/ML
4 INJECTION, SOLUTION INTRAVENOUS EVERY 6 HOURS PRN
Status: DISCONTINUED | OUTPATIENT
Start: 2024-03-06 | End: 2024-04-05 | Stop reason: HOSPADM

## 2024-03-06 RX ORDER — DEXTROSE MONOHYDRATE AND SODIUM CHLORIDE 5; .45 G/100ML; G/100ML
125 INJECTION, SOLUTION INTRAVENOUS CONTINUOUS PRN
Status: DISCONTINUED | OUTPATIENT
Start: 2024-03-06 | End: 2024-03-06

## 2024-03-06 RX ORDER — ACETAMINOPHEN 325 MG/1
650 TABLET ORAL EVERY 4 HOURS PRN
Status: DISCONTINUED | OUTPATIENT
Start: 2024-03-06 | End: 2024-04-01

## 2024-03-06 RX ORDER — SODIUM CHLORIDE 0.9 % (FLUSH) 0.9 %
10 SYRINGE (ML) INJECTION
Status: DISCONTINUED | OUTPATIENT
Start: 2024-03-06 | End: 2024-03-08

## 2024-03-06 RX ORDER — AMOXICILLIN AND CLAVULANATE POTASSIUM 875; 125 MG/1; MG/1
1 TABLET, FILM COATED ORAL EVERY 12 HOURS
Status: DISCONTINUED | OUTPATIENT
Start: 2024-03-06 | End: 2024-03-08

## 2024-03-06 RX ORDER — SODIUM CHLORIDE 9 MG/ML
125 INJECTION, SOLUTION INTRAVENOUS CONTINUOUS
Status: DISCONTINUED | OUTPATIENT
Start: 2024-03-06 | End: 2024-03-06

## 2024-03-06 RX ADMIN — Medication 10 ML: at 06:03

## 2024-03-06 RX ADMIN — PANTOPRAZOLE SODIUM 40 MG: 40 TABLET, DELAYED RELEASE ORAL at 08:03

## 2024-03-06 RX ADMIN — TAMSULOSIN HYDROCHLORIDE 0.4 MG: 0.4 CAPSULE ORAL at 08:03

## 2024-03-06 RX ADMIN — MEROPENEM 2 G: 1 INJECTION, POWDER, FOR SOLUTION INTRAVENOUS at 05:03

## 2024-03-06 RX ADMIN — SODIUM CHLORIDE 125 ML/HR: 9 INJECTION, SOLUTION INTRAVENOUS at 02:03

## 2024-03-06 RX ADMIN — Medication 10 ML: at 01:03

## 2024-03-06 RX ADMIN — INSULIN ASPART 7 UNITS: 100 INJECTION, SOLUTION INTRAVENOUS; SUBCUTANEOUS at 12:03

## 2024-03-06 RX ADMIN — ENOXAPARIN SODIUM 120 MG: 120 INJECTION SUBCUTANEOUS at 08:03

## 2024-03-06 RX ADMIN — ENOXAPARIN SODIUM 120 MG: 120 INJECTION SUBCUTANEOUS at 09:03

## 2024-03-06 RX ADMIN — POTASSIUM CHLORIDE 40 MEQ: 1500 TABLET, EXTENDED RELEASE ORAL at 04:03

## 2024-03-06 RX ADMIN — AMOXICILLIN AND CLAVULANATE POTASSIUM 1 TABLET: 875; 125 TABLET, FILM COATED ORAL at 09:03

## 2024-03-06 RX ADMIN — ACETAMINOPHEN 650 MG: 325 TABLET ORAL at 09:03

## 2024-03-06 RX ADMIN — MEROPENEM 2 G: 1 INJECTION, POWDER, FOR SOLUTION INTRAVENOUS at 12:03

## 2024-03-06 RX ADMIN — ATORVASTATIN CALCIUM 40 MG: 40 TABLET, FILM COATED ORAL at 08:03

## 2024-03-06 RX ADMIN — INSULIN ASPART 7 UNITS: 100 INJECTION, SOLUTION INTRAVENOUS; SUBCUTANEOUS at 08:03

## 2024-03-06 RX ADMIN — HYDRALAZINE HYDROCHLORIDE 100 MG: 50 TABLET ORAL at 05:03

## 2024-03-06 RX ADMIN — HYDRALAZINE HYDROCHLORIDE 100 MG: 50 TABLET ORAL at 09:03

## 2024-03-06 RX ADMIN — Medication 10 ML: at 05:03

## 2024-03-06 RX ADMIN — POTASSIUM CHLORIDE 40 MEQ: 1500 TABLET, EXTENDED RELEASE ORAL at 09:03

## 2024-03-06 RX ADMIN — INSULIN ASPART 7 UNITS: 100 INJECTION, SOLUTION INTRAVENOUS; SUBCUTANEOUS at 04:03

## 2024-03-06 RX ADMIN — INSULIN DETEMIR 30 UNITS: 100 INJECTION, SOLUTION SUBCUTANEOUS at 09:03

## 2024-03-06 RX ADMIN — GABAPENTIN 300 MG: 300 CAPSULE ORAL at 08:03

## 2024-03-06 RX ADMIN — GABAPENTIN 300 MG: 300 CAPSULE ORAL at 09:03

## 2024-03-06 RX ADMIN — HYDRALAZINE HYDROCHLORIDE 100 MG: 50 TABLET ORAL at 01:03

## 2024-03-06 RX ADMIN — Medication 10 ML: at 12:03

## 2024-03-06 NOTE — ASSESSMENT & PLAN NOTE
Saji Castañeda is a 74 y.o. male who is status post left lower extremity toe part amputation.  Now presenting for acute infection, with acute on chronic osteomyelitis of the left calcaneus.  Wound is malodorous and exuding purulence.  Left proximal leg with weeping purulence noted as well.  Arterial ultrasound shows no hemodynamically significant stenosis.  Left MRI shows osteomyelitis.  At this time there is no more proximal amputation that Podiatry can offer.     - recommend vascular surgery consult for possible BKA versus AKA.  Appreciate recs.  Vascular surgery consulted.  - antibiotics per ID/primary.  Cultures taken of left posterior calcaneus.  - wounds cleansed and dressed, wound care orders to follow.  - nonweightbearing to left lower extremity.  - pain management per primary.

## 2024-03-06 NOTE — SUBJECTIVE & OBJECTIVE
Scheduled Meds:   amoxicillin-clavulanate 875-125mg  1 tablet Oral Q12H    atorvastatin  40 mg Oral Daily    enoxparin  1 mg/kg Subcutaneous Q12H    gabapentin  300 mg Oral BID    hydrALAZINE  100 mg Oral Q8H    insulin aspart U-100  7 Units Subcutaneous TIDWM    insulin detemir U-100  30 Units Subcutaneous QHS    pantoprazole  40 mg Oral Daily    sodium chloride 0.9%  10 mL Intravenous Q6H    tamsulosin  0.4 mg Oral Daily     Continuous Infusions:  PRN Meds:acetaminophen, aluminum-magnesium hydroxide-simethicone, dextrose 10%, dextrose 10%, dextrose 10%, melatonin, ondansetron, sodium chloride 0.9%, Flushing PICC/Midline Protocol **AND** sodium chloride 0.9% **AND** sodium chloride 0.9%, sodium chloride 0.9%, triamcinolone acetonide 0.025%    Review of patient's allergies indicates:  No Known Allergies     Past Medical History:   Diagnosis Date    Arthritis     legs    Diabetes mellitus     Diabetes mellitus, type 2     Hyperlipidemia     Hypertension     Osteomyelitis     Palliative care encounter 5/24/2023     Past Surgical History:   Procedure Laterality Date    ANGIOGRAPHY OF LOWER EXTREMITY N/A 2/3/2021    Procedure: Angiogram Extremity Bilateral;  Surgeon: Ernst Chacko MD;  Location: [facility] OR [address] [place] [address];  Service: Peripheral Vascular;  Laterality: N/A;  7.4 mintues fluoroscopy time  816.15 mGy  170.17 Gycm2    AORTOGRAPHY WITH EXTREMITY RUNOFF Bilateral 2/3/2021    Procedure: AORTOGRAM, WITH EXTREMITY RUNOFF;  Surgeon: Ernst Chacko MD;  Location: [facility] OR [address] [place] [address];  Service: Peripheral Vascular;  Laterality: Bilateral;    DEBRIDEMENT OF FOOT Left 2/23/2021    Procedure: DEBRIDEMENT, LEFT HEEL;  Surgeon: Mayra Schroeder DPM;  Location: [facility] OR [address] [place] [address];  Service: Podiatry;  Laterality: Left;    FOOT AMPUTATION  October 2010    left high midfoot amputation    IMPLANTATION OF LEADLESS PACEMAKER N/A 10/12/2023    Procedure: [id number];  Surgeon: VANESSA Delatorre MD;   Location: [facility] LAB;  Service: Cardiology;  Laterality: N/A;  AVB, MICRA, EH, ANES, RM 11163       Family History       Problem Relation (Age of Onset)    Cancer Brother    Diabetes Mother, Sister    Heart disease Mother    Stroke Sister          Tobacco Use    Smoking status: Never    Smokeless tobacco: Never   Substance and Sexual Activity    Alcohol use: No     Comment: occassional    Drug use: No    Sexual activity: Not Currently     Review of Systems   Constitutional:  Positive for chills and fatigue. Negative for diaphoresis and fever.   HENT:  Negative for congestion, ear pain, nosebleeds, rhinorrhea and sore throat.    Eyes:  Negative for pain.   Respiratory:  Negative for cough, shortness of breath and wheezing.    Cardiovascular:  Positive for leg swelling. Negative for chest pain.   Gastrointestinal:  Negative for abdominal pain, constipation, diarrhea, nausea and vomiting.   Genitourinary:  Negative for dysuria, frequency and urgency.   Musculoskeletal:  Positive for arthralgias, gait problem and myalgias. Negative for back pain and neck pain.   Skin:  Positive for color change and wound.   Neurological:  Negative for weakness, numbness and headaches.     Objective:     Vital Signs (Most Recent):  Temp: 99.2 °F (37.3 °C) (03/06/24 1535)  Pulse: 97 (03/06/24 1631)  Resp: 19 (03/06/24 1535)  BP: (!) 156/67 (03/06/24 1535)  SpO2: 98 % (03/06/24 1622) Vital Signs (24h Range):  Temp:  [98.2 °F (36.8 °C)-99.2 °F (37.3 °C)] 99.2 °F (37.3 °C)  Pulse:  [] 97  Resp:  [15-27] 19  SpO2:  [95 %-100 %] 98 %  BP: (115-157)/(59-92) 156/67     Weight: 120.2 kg (265 lb)  Body mass index is 39.13 kg/m².    Foot Exam    General  Orientation: unable to assess       Right Foot/Ankle     Inspection and Palpation  Skin Exam: dry skin, fissure, skin changes, abnormal color and ulcer;     Neurovascular  Dorsalis pedis: absent  Posterior tibial: absent  Saphenous nerve sensation: diminished  Tibial nerve sensation:  diminished  Superficial peroneal nerve sensation: diminished  Deep peroneal nerve sensation: diminished  Sural nerve sensation: diminished    Comments  Right anterior ankle wound:  Wound is superficial, with well adhered epithelialized tissue.  No surrounding erythema or edema noted.    Diffuse xerotic, abrasions, wounds, hemosiderin deposits and dry flaky skin noted.    Left Foot/Ankle      Inspection and Palpation  Skin Exam: dry skin, skin changes, abnormal color, ulcer and erythema;     Neurovascular  Dorsalis pedis: absent  Posterior tibial: absent  Saphenous nerve sensation: diminished  Tibial nerve sensation: diminished  Superficial peroneal nerve sensation: diminished  Deep peroneal nerve sensation: diminished  Sural nerve sensation: diminished    Comments  Status post Chopart amputation:  Now with full-thickness wound on the posterior aspect.  Wound is malodorous, probes deep to bone.  Granular base with macerated borders.  Undermining noted throughout plantar aspect.    Left medial leg wound:  Irregular borders and widespread with weeping purulence noted.  Malodorous.  Surrounding erythema noted.  Edema noted.  Diffuse eschar and fibrous tissue noted.    Diffuse xerotic, abrasions, wounds, hemosiderin deposits                                          Laboratory:  A1C:   Recent Labs   Lab 09/13/23  0616 03/05/24  0900   HGBA1C 11.0* >14.0*     BMP:   Recent Labs   Lab 03/06/24  0846 03/06/24  0847 03/06/24  1306   GLU  --    < > 189*   NA  --    < > 142   K  --    < > 4.3   CL  --    < > 105   CO2  --    < > 26   BUN  --    < > 21   CREATININE  --    < > 1.6*   CALCIUM  --    < > 9.0   MG 2.1  --   --     < > = values in this interval not displayed.     CBC:   Recent Labs   Lab 03/06/24  0847   WBC 10.65   RBC 3.88*   HGB 9.7*   HCT 32.4*      MCV 84   MCH 25.0*   MCHC 29.9*     CMP:   Recent Labs   Lab 03/06/24  0847 03/06/24  1306   *  173* 189*   CALCIUM 9.0  9.0 9.0   ALBUMIN 1.9*  --   "  PROT 7.2  --      141 142   K 3.9  3.9 4.3   CO2 24  24 26     105 105   BUN 25*  25* 21   CREATININE 1.6*  1.6* 1.6*   ALKPHOS 69  --    ALT 21  --    AST 65*  --    BILITOT 0.6  --      CRP:   Recent Labs   Lab 03/05/24  0900   .7*     ESR: No results for input(s): "SEDRATE" in the last 168 hours.  Wound Cultures:   Recent Labs   Lab 10/03/23  1216   LABAERO FERNANDO ALBICANS  Few  *  ACHROMOBACTER XYLOSOXIDANS SUBSP. DENTRIFICANS  Rare  Skin bossman also present  *     Microbiology Results (last 7 days)       Procedure Component Value Units Date/Time    Culture, Anaerobe [[id number]]     Order Status: No result Specimen: Wound from Leg, Left     Aerobic culture [[id number]]     Order Status: No result Specimen: Wound from Leg, Left     Gram stain [[id number]]     Order Status: No result Specimen: Wound from Leg, Left     Fungus culture [[id number]]     Order Status: No result Specimen: Wound from Leg, Left     AFB Culture & Smear [[id number]]     Order Status: No result Specimen: Wound from Leg, Left     Blood culture x two cultures. Draw prior to antibiotics. [[id number]] Collected: 03/05/24 0900    Order Status: Completed Specimen: Blood from Peripheral, Hand, Left Updated: 03/06/24 1012     Blood Culture, Routine No Growth to date      No Growth to date    Narrative:      Aerobic and anaerobic    Blood culture x two cultures. Draw prior to antibiotics. [[id number]] Collected: 03/05/24 0900    Order Status: Completed Specimen: Blood from Peripheral, Forearm, Right Updated: 03/06/24 1012     Blood Culture, Routine No Growth to date      No Growth to date    Narrative:      Aerobic and anaerobic    Aerobic culture [[id number]]     Order Status: Canceled Specimen: Wound           Specimen (24h ago, onward)      None            Diagnostic Results:  I have reviewed all pertinent imaging results/findings within the past 24 hours.  None  "

## 2024-03-06 NOTE — PROGRESS NOTES
Therapy with vancomycin complete and/or consult discontinued by provider.  Pharmacy will sign off, please re-consult as needed.    Luma Ham, PharmD, BCPS  Clinical Pharmacist - Internal Medicine   [id number]

## 2024-03-06 NOTE — PLAN OF CARE
Pt engaged well in evaluative session this date, though limited by pain and BLE wounds.     Problem: Occupational Therapy  Goal: Occupational Therapy Goal  Description: Goals to be met by: 4/6/24     Patient will increase functional independence with ADLs by performing:    UE Dressing with Contact Guard Assistance.  LE Dressing with Maximum Assistance.  Grooming while EOB with Stand-by Assistance.  Toileting from bedside commode with Maximum Assistance for hygiene and clothing management.   Sitting at edge of bed x5 minutes with Stand-by Assistance.  Rolling to Bilateral with Minimal Assistance.   Supine to sit with Minimal Assistance.  Stand pivot transfers with Minimal Assistance.  Toilet transfer to bedside commode with Maximum Assistance.  BUE strengthening exercise program 2x day with theraband/HEP worksheet    Outcome: Ongoing, Progressing

## 2024-03-06 NOTE — PLAN OF CARE
Problem: Physical Therapy  Goal: Physical Therapy Goal  Description: Goals to be met by: 24     Patient will increase functional independence with mobility by performin. Supine to sit with Moderate Assistance  2. Sit to supine with Moderate Assistance  3. Rolling to Left and Right with Moderate Assistance.  4. Sit to stand transfer with Maximum Assistance with LRAD  5. Bed to chair transfer with Moderate Assistance using LRAD  6. Wheelchair propulsion x100 feet with Stand-by Assistance using UE/LE  7. Sitting at edge of bed 10 minutes with Modified [place]  8. Lower extremity exercise program x30 reps per handout, with assistance as needed    Outcome: Ongoing, Progressing    Evaluation Complete. Goals Appropriate.

## 2024-03-06 NOTE — PROGRESS NOTES
Pharmacist Dose Adjustment Note    Saji Castañeda is a 74 y.o. male being treated with enoxaparin 120 mg (1mg/kg) every 24 hours for ACS, A.fib, chronic DVT     Patient Data:    Vital Signs (Most Recent):  Temp: 98.6 °F (37 °C) (03/05/24 1930)  Pulse: 78 (03/05/24 2147)  Resp: 20 (03/05/24 2147)  BP: (!) 149/70 (03/05/24 2147)  SpO2: 95 % (03/05/24 2147) Vital Signs (72h Range):  Temp:  [98.4 °F (36.9 °C)-98.6 °F (37 °C)]   Pulse:  []   Resp:  [14-22]   BP: (128-182)/(59-78)   SpO2:  [93 %-100 %]      Recent Labs   Lab 03/05/24  0900 03/05/24  1223   CREATININE 2.1* 1.8*     Serum creatinine: 1.8 mg/dL (H) 03/05/24 1223  Estimated creatinine clearance: 45.4 mL/min (A)    Adjusted to   Enoxaparin 120 mg every SQ 12 hours per protocol     Pharmacist's Name: Darwin Benito  Pharmacist's Extension: [phone]

## 2024-03-06 NOTE — ASSESSMENT & PLAN NOTE
Creatine stable for now. BMP reviewed- noted Estimated Creatinine Clearance: 51.1 mL/min (A) (based on SCr of 1.6 mg/dL (H)). according to latest data. Based on current GFR, CKD stage is stage 3 - GFR 30-59.  Monitor UOP and serial BMP and adjust therapy as needed. Renally dose meds. Avoid nephrotoxic medications and procedures.   3

## 2024-03-06 NOTE — PLAN OF CARE
[facility] - Emergency Dept  Initial Discharge Assessment       Primary Care Provider: [facility] -    Admission Diagnosis: Sepsis [A41.9]    Admission Date: 3/5/2024  Expected Discharge Date: 3/8/2024    Transition of Care Barriers: (P) Does not adhere to care plan, Mobility, Social, Transportation, Other (see comments) (limited reliable assistance in the home)    Payor: [employer] MANAGED MEDICARE / Plan: [employer] SNP HMO PPO SPECIAL NEEDS / Product Type: Medicare Advantage /     Extended Emergency Contact Information  Primary Emergency Contact: Kandy Castañeda   [place]  Mobile Phone: [phone]  Relation: Sister  Secondary Emergency Contact: January Nicholson   [place]  Mobile Phone: [phone]  Relation: Sister    Discharge Plan A: (P) Long-term acute care facility (LTAC)  Discharge Plan B: (P) Skilled Nursing Facility      [id number] - [place] - [facility] [address] AT [facility] [address]  [facility] [place] [address]  Phone: [phone] Fax: [phone]      Initial Assessment (most recent)       Adult Discharge Assessment - 03/05/24 1846          Discharge Assessment    Assessment Type Discharge Planning Assessment (P)      Confirmed/corrected address, phone number and insurance Yes (P)      Confirmed Demographics Correct on Facesheet (P)      Source of Information patient;health record (P)      Communicated OXANA with patient/caregiver Yes (P)      People in Home other relative(s) (P)      Do you expect to return to your current living situation? No (P)      Do you have help at home or someone to help you manage your care at home? Yes (P)      Prior to hospitilization cognitive status: Alert/Oriented (P)      Current cognitive status: Alert/Oriented (P)      Equipment Currently Used at Home wheelchair (P)      Readmission within 30 days? No (P)      Patient currently being followed by outpatient case management? No  (P)      Do you currently have service(s) that help you manage your care at home? No (P)      Do you take prescription medications? Yes (P)      Do you have prescription coverage? Yes (P)      Do you have any problems affording any of your prescribed medications? TBD (P)      How do you get to doctors appointments? health plan transportation (P)      Discharge Plan A Long-term acute care facility (LTAC) (P)      Discharge Plan B Skilled Nursing Facility (P)      Discharge Plan discussed with: Patient (P)      Transition of Care Barriers Does not adhere to care plan;Mobility;Social;Transportation;Other (see comments) (P)    limited reliable assistance in the home       Physical Activity    On average, how many days per week do you engage in moderate to strenuous exercise (like a brisk walk)? 0 days (P)      On average, how many minutes do you engage in exercise at this level? 0 min (P)         Financial Resource Strain    How hard is it for you to pay for the very basics like food, housing, medical care, and heating? Somewhat hard (P)         Housing Stability    In the last 12 months, was there a time when you were not able to pay the mortgage or rent on time? No (P)      In the last 12 months, was there a time when you did not have a steady place to sleep or slept in a shelter (including now)? No (P)         Transportation Needs    In the past 12 months, has lack of transportation kept you from medical appointments or from getting medications? No (P)      In the past 12 months, has lack of transportation kept you from meetings, work, or from getting things needed for daily living? No (P)         Food Insecurity    Within the past 12 months, you worried that your food would run out before you got the money to buy more. Never true (P)         Stress    Do you feel stress - tense, restless, nervous, or anxious, or unable to sleep at night because your mind is troubled all the time - these days? Only a little (P)          Social Connections    How often do you attend Roman Catholic or Christian services? Never (P)      Do you belong to any clubs or organizations such as Roman [employer] groups, unions, fraternal or athletic groups, or school groups? No (P)      How often do you attend meetings of the clubs or organizations you belong to? Never (P)         Alcohol Use    Q1: How often do you have a drink containing alcohol? Patient declined (P)

## 2024-03-06 NOTE — CONSULTS
[facility] - Telemetry Stepdown  Infectious Disease  Consult Note    Patient Name: Saji Castañeda  MRN: [id number]  Admission Date: 3/5/2024  Hospital Length of Stay: 1 days  Attending Physician: Jm Rosa MD  Primary Care Provider: [facility] -     Isolation Status: Contact      Inpatient consult to Infectious Diseases  Consult performed by: Raheem White PA-C  Consult ordered by: Lynette Perkins MD        Assessment/Plan:     ID  Cellulitis of left lower leg  75 y/o male with a.fib, DMII uncontrolled, HLD, HTN, chronic heel ulcerations c/b chronic osteomyelitis, hx of  Acinetobacter and ESBL klebsiella admitted for DKA. ID consulted for chronic wounds/ulcerations and abx guidance as with hx of MDROs. Remained afebrile, no leukocytosis. Pt was given dose of meropenem. Blood cultures remain NGTD.       Recommendations  Pt with chronic ulcerations/wounds without progression. Pt with LLE cellulitis, no purulent drainage from skin ulcerations. Would start empiric augmentin to treat for SSTI x 7-10 days. Recommend judicial leg elevation and wound care to ulcerations and wounds.   Would hold off on further imaging unless with progression of wounds/ulcerations or swelling  Podiatry f/u if concerns for osteomyelitis would recommend obtaining bone bipsy for path and cultures   Would avoid obtaining superficial swab cultures if without purulent drainage   Blood culture remain NGTD.     Discussed with ID staff           Thank you for your consult. I will follow-up with patient. Please contact us if you have any additional questions.    Raheem White PA-C  Infectious Disease  [facility] - Telemetry Stepdown    Subjective:     Principal Problem: DKA (diabetic ketoacidosis)    HPI: 75 y/o male with a.fib, DMII uncontrolled, HLD, HTN, chronic heel ulcerations c/b chronic osteomyelitis, hx of  Acinetobacter and ESBL klebsiella admitted for DKA. ID consulted for chronic wounds/ulcerations and abx guidance as  with hx of MDROs. Remained afebrile, no leukocytosis. Pt was given dose of meropenem. Blood cultures remain NGTD.     Past Medical History:   Diagnosis Date    Arthritis     legs    Diabetes mellitus     Diabetes mellitus, type 2     Hyperlipidemia     Hypertension     Osteomyelitis     Palliative care encounter 5/24/2023       Past Surgical History:   Procedure Laterality Date    ANGIOGRAPHY OF LOWER EXTREMITY N/A 2/3/2021    Procedure: Angiogram Extremity Bilateral;  Surgeon: Ernst Chacko MD;  Location: [facility] OR [address] [place] [address];  Service: Peripheral Vascular;  Laterality: N/A;  7.4 mintues fluoroscopy time  816.15 mGy  170.17 Gycm2    AORTOGRAPHY WITH EXTREMITY RUNOFF Bilateral 2/3/2021    Procedure: AORTOGRAM, WITH EXTREMITY RUNOFF;  Surgeon: Ernst Chacko MD;  Location: [facility] OR [address] [place] [address];  Service: Peripheral Vascular;  Laterality: Bilateral;    DEBRIDEMENT OF FOOT Left 2/23/2021    Procedure: DEBRIDEMENT, LEFT HEEL;  Surgeon: Mayra Schroeder DPM;  Location: [facility] OR [address] [place] [address];  Service: Podiatry;  Laterality: Left;    FOOT AMPUTATION  October 2010    left high midfoot amputation    IMPLANTATION OF LEADLESS PACEMAKER N/A 10/12/2023    Procedure: [id number];  Surgeon: VANESSA Delatorre MD;  Location: [facility] EP LAB;  Service: Cardiology;  Laterality: N/A;  AVB, MICRA, EH, ANES, [place] [id number]       Review of patient's allergies indicates:  No Known Allergies    Medications:  Medications Prior to Admission   Medication Sig    acetaminophen (TYLENOL) 325 MG tablet Take 650 mg by mouth every 6 (six) hours as needed for Temperature greater than.    acidophilus-pectin, citrus 100 million cell-10 mg Cap Take 1 capsule by mouth 2 (two) times a day.    apixaban (ELIQUIS) 5 mg Tab Take 1 tablet (5 mg total) by mouth 2 (two) times daily.    ascorbic acid, vitamin C, (VITAMIN C) 500 MG tablet Take 500 mg by mouth 2 (two) times daily.    B COMPLEX-VITAMIN B12 tablet Take 1 tablet by mouth once daily.     "BD AUTOSHIELD DUO PEN NEEDLE 30 gauge x 3/16" Ndle     BD ULTRA-FINE ADITYA PEN NEEDLE 32 gauge x 5/32" Ndle USE FOUR TIMES DAILY WITH MEALS AND NIGHTLY    blood sugar diagnostic (CONTOUR TEST STRIPS) Strp Inject 1 strip into the skin 2 (two) times daily before meals.    clopidogreL (PLAVIX) 75 mg tablet Take 1 tablet (75 mg total) by mouth once daily.    diphenhydrAMINE (BENADRYL) 25 mg capsule No directions listed on the patients medication list.    furosemide (LASIX) 40 MG tablet Take 20 mg by mouth.    gabapentin (NEURONTIN) 300 MG capsule Take 300 mg by mouth 2 (two) times daily.    glipiZIDE (GLUCOTROL) 10 MG tablet Take 20 mg by mouth 2 (two) times a day.    hydrALAZINE (APRESOLINE) 100 MG tablet Take 1 tablet (100 mg total) by mouth every 8 (eight) hours.    insulin aspart U-100 (NOVOLOG) 100 unit/mL (3 mL) InPn pen Inject 6 Units into the skin 3 (three) times daily with meals.    isosorbide dinitrate (ISORDIL) 10 MG tablet Take 1 tablet (10 mg total) by mouth 3 (three) times daily.    LANTUS SOLOSTAR U-100 INSULIN glargine 100 units/mL SubQ pen Inject 45 Units into the skin every evening. (Patient taking differently: Inject 45 Units into the skin once daily.)    miconazole NITRATE 2 % (MICOTIN) 2 % top powder Apply topically 2 (two) times daily.    MICROLET LANCET Misc     multivitamin (THERAGRAN) per tablet Take 1 tablet by mouth once daily.    NIFEdipine (PROCARDIA-XL) 60 MG (OSM) 24 hr tablet Take 1 tablet (60 mg total) by mouth once daily.    oxyCODONE 5 mg TbOr Take 1 tablet by mouth every 6 (six) hours as needed (Pain (Max 5 daily)).    pantoprazole (PROTONIX) 40 MG tablet Take 40 mg by mouth once daily. Before breakfast    pen needle, diabetic 32 gauge x 5/32" Ndle use as directed with insulin    rosuvastatin (CRESTOR) 40 MG Tab Take 40 mg by mouth once daily.    sodium hypochlorite 0.5 % (DAKIN'S SOLUTION) external solution Apply topically once daily.    tamsulosin (FLOMAX) 0.4 mg Cap Take 0.4 mg by " mouth once daily.    triamcinolone acetonide 0.025% (KENALOG) 0.025 % Oint Apply topically 2 (two) times daily as needed (to affected area).     Antibiotics (From admission, onward)      None          Antifungals (From admission, onward)      None          Antivirals (From admission, onward)      None             Immunization History   Administered Date(s) Administered    COVID-19, MRNA, LN-S, PF (MODERNA FULL 0.5 ML DOSE) 05/24/2021, 07/13/2021    Influenza - Trivalent (ADULT) 10/08/2020    PPD Test 12/09/2020, 03/02/2021, 06/21/2022    Pneumococcal Conjugate - 13 Valent 10/16/2018       Family History       Problem Relation (Age of Onset)    Cancer Brother    Diabetes Mother, Sister    Heart disease Mother    Stroke Sister          Social History     Socioeconomic History    Marital status: Single   Tobacco Use    Smoking status: Never    Smokeless tobacco: Never   Substance and Sexual Activity    Alcohol use: No     Comment: occassional    Drug use: No    Sexual activity: Not Currently   Social History Narrative    Not currently working; lives with family     Social Determinants of Health     Financial Resource Strain: Medium Risk (3/5/2024)    Overall Financial Resource Strain (CARDIA)     Difficulty of Paying Living Expenses: Somewhat hard   Food Insecurity: No Food Insecurity (3/5/2024)    Hunger Vital Sign     Worried About Running Out of Food in the Last Year: Never true     Ran Out of Food in the Last Year: Never true   Transportation Needs: No Transportation Needs (3/5/2024)    PRAPARE - Transportation     Lack of Transportation (Medical): No     Lack of Transportation (Non-Medical): No   Physical Activity: Inactive (3/5/2024)    Exercise Vital Sign     Days of Exercise per Week: 0 days     Minutes of Exercise per Session: 0 min   Stress: No Stress Concern Present (3/5/2024)    [employer] [place] [employer] - Occupational Stress Questionnaire     Feeling of Stress : Only a little   Social  Connections: Socially Isolated (3/5/2024)    Social Connection and Isolation Panel [NHANES]     Frequency of Communication with Friends and Family: More than three times a week     Frequency of Social Gatherings with Friends and Family: Patient declined     Attends [employer] Services: Never     Active Member of Clubs or Organizations: No     Attends Club or Organization Meetings: Never     Marital Status: Never    Housing Stability: Low Risk  (3/5/2024)    Housing Stability Vital Sign     Unable to Pay for Housing in the Last Year: No     Number of Places Lived in the Last Year: 1     Unstable Housing in the Last Year: No     Review of Systems   Constitutional:  Positive for chills and fatigue. Negative for diaphoresis and fever.   HENT:  Negative for congestion, ear pain, nosebleeds, rhinorrhea and sore throat.    Eyes:  Negative for pain.   Respiratory:  Negative for cough, shortness of breath and wheezing.    Cardiovascular:  Positive for leg swelling. Negative for chest pain.   Gastrointestinal:  Negative for abdominal pain, constipation, diarrhea, nausea and vomiting.   Genitourinary:  Negative for dysuria, frequency and urgency.   Musculoskeletal:  Positive for arthralgias, gait problem and myalgias. Negative for back pain and neck pain.   Skin:  Positive for color change and wound.   Neurological:  Negative for weakness, numbness and headaches.     Objective:     Vital Signs (Most Recent):  Temp: 98.3 °F (36.8 °C) (03/06/24 1114)  Pulse: 100 (03/06/24 1142)  Resp: 15 (03/06/24 1114)  BP: (!) 149/70 (03/06/24 1114)  SpO2: 100 % (03/06/24 1114) Vital Signs (24h Range):  Temp:  [98.2 °F (36.8 °C)-98.9 °F (37.2 °C)] 98.3 °F (36.8 °C)  Pulse:  [] 100  Resp:  [15-27] 15  SpO2:  [95 %-100 %] 100 %  BP: (115-170)/(59-92) 149/70     Weight: 120.2 kg (265 lb)  Body mass index is 39.13 kg/m².    Estimated Creatinine Clearance: 51.1 mL/min (A) (based on SCr of 1.6 mg/dL (H)).     Physical Exam  Vitals and  nursing note reviewed.   Constitutional:       General: He is not in acute distress.     Appearance: He is well-developed. He is ill-appearing. He is not diaphoretic.   HENT:      Head: Normocephalic and atraumatic.   Eyes:      Pupils: Pupils are equal, round, and reactive to light.   Cardiovascular:      Rate and Rhythm: Normal rate and regular rhythm.      Heart sounds: Normal heart sounds. No murmur heard.     No friction rub. No gallop.   Pulmonary:      Effort: Pulmonary effort is normal. No respiratory distress.      Breath sounds: Normal breath sounds. No wheezing or rales.   Chest:      Chest wall: No tenderness.   Abdominal:      General: Bowel sounds are normal. There is no distension.      Palpations: There is no mass.      Tenderness: There is no abdominal tenderness. There is no guarding.   Musculoskeletal:         General: Swelling and signs of injury present. No tenderness or deformity. Normal range of motion.      Cervical back: Normal range of motion and neck supple.      Right lower leg: Edema present.      Left lower leg: Edema present.   Skin:     General: Skin is warm and dry.      Findings: Erythema (LLE) present. No bruising or rash.      Comments: Superficial wound of LLE, no purulent drainage   Neurological:      Mental Status: He is alert and oriented to person, place, and time.   Psychiatric:         Behavior: Behavior normal.         Thought Content: Thought content normal.         Judgment: Judgment normal.                Significant Labs: All pertinent labs within the past 24 hours have been reviewed.    Significant Imaging: I have reviewed all pertinent imaging results/findings within the past 24 hours.

## 2024-03-06 NOTE — PROGRESS NOTES
[facility] - Telemetry [facility] Medicine  Progress Note    Patient Name: Saji Castañeda  MRN: [id number]  Patient Class: IP- Inpatient   Admission Date: 3/5/2024  Length of Stay: 1 days  Attending Physician: Jm Rosa MD  Primary Care Provider: [facility] -        Subjective:     Principal Problem:DKA (diabetic ketoacidosis)        HPI:  Saji Castañeda is a 74 y.o. male with a medical history of DM2, HLD, HTN, chronic osteomyelitis of L heel, L TMA, PAD, hx of ESBL klebsiella, acinetobacter bacteremia, presenting with hyperglycemia. He presented to the ED via EMS and his POCT glucose on arrival was >500. Serum blood glucose 667 with anion gap of 17 and elevated ketones. Serum potassium 3.4, corrected sodium 149. Urinalysis significant for RBCs, glucosuria, and moderate bacteria and yeast. CBC revealed leukocytosis, elevated platelets, and mild anemia. CMP shows Na 135, K 3.4, BUN 29, Cr 2.1, Glucose 677, Lactate 4.61. BNP and troponin elevated. GFR 32.2. Insulin drip, IV fluids, zosyn, vancomycin, and potassium given.      He is unable to provide much history, but states that he has not been taking his home medications for at least a week. He reports shortness of breath, fatigue, and weakness for the last 6-7 days. He has chills and reports episodes of emesis. He denies abdominal pain, chest pain, palpitations, recent illness/infection, fevers, changes to bowel movements and urinary output. Patient lives with his brother and sister at home. He was last seen in the ED on 2/21 after a fall. He was hypoglycemic BGL 60 at that time which returned to normal after eating. He was also scheduled for a wound clinic appointment today 3/5 at [facility] with Dr. Wilson.        Overview/Hospital Course:  No notes on file    Interval History: overnight it was difficult to get an IV access and patient had anion gap closed and BG was lowered therefore insulin drip was stopped and transitioned to subcutaneous  insulin.      Review of Systems    Constitutional:  Positive for activity change, appetite change, chills and fatigue.   Respiratory:  Positive for shortness of breath. Negative for cough.    Cardiovascular:  Positive for leg swelling. Negative for chest pain.   Gastrointestinal:  Negative for abdominal pain, diarrhea, nausea and vomiting.   Genitourinary:  Positive for dysuria. Negative for frequency and urgency.   Neurological:  Positive for weakness. Negative for seizures, light-headedness and numbness.   Psychiatric/Behavioral:  Positive for decreased concentration. Negative for agitation and behavioral problems.   Objective:     Vital Signs (Most Recent):  Temp: 98.3 °F (36.8 °C) (03/06/24 1114)  Pulse: 100 (03/06/24 1142)  Resp: 15 (03/06/24 1114)  BP: (!) 149/70 (03/06/24 1114)  SpO2: 100 % (03/06/24 1114) Vital Signs (24h Range):  Temp:  [98.2 °F (36.8 °C)-98.9 °F (37.2 °C)] 98.3 °F (36.8 °C)  Pulse:  [] 100  Resp:  [15-27] 15  SpO2:  [95 %-100 %] 100 %  BP: (115-170)/(59-92) 149/70     Weight: 120.2 kg (265 lb)  Body mass index is 39.13 kg/m².    Intake/Output Summary (Last 24 hours) at 3/6/2024 1321  Last data filed at 3/6/2024 0905  Gross per 24 hour   Intake 120 ml   Output --   Net 120 ml         Physical Exam     Constitutional:       Appearance: He is obese. He is ill-appearing.      Comments: lethargic   Cardiovascular:      Rate and Rhythm: Regular rhythm. Tachycardia present.      Pulses: Normal pulses.      Heart sounds: Normal heart sounds.   Pulmonary:      Effort: Pulmonary effort is normal.      Breath sounds: Normal breath sounds.   Musculoskeletal:         General: Swelling present.      Comments: Patient has very dry bark like skin of both legs, left foot amputation, with left leg wounds, he has multiple large wounds with purulent foul smelling abscess and ulceration.   Neurological:      General: No focal deficit present.      Mental Status: He is oriented to person, place, and time.                   Significant Labs: All pertinent labs within the past 24 hours have been reviewed.     Significant Imaging: I have reviewed all pertinent imaging results/findings within the past 24 hours.  Assessment/Plan:      * DKA (diabetic ketoacidosis)  Patient's FSGs are uncontrolled due to hyperglycemia on current medication regimen.  Last A1c reviewed-         Lab Results   Component Value Date     HGBA1C >14.0 (H) 03/05/2024      Most recent fingerstick glucose reviewed-          Recent Labs   Lab 03/05/24  1246 03/05/24  1345 03/05/24  1441 03/05/24  1546   POCTGLUCOSE >500* 412* 333* 139*      Current correctional scale   Insulin drip  Maintain anti-hyperglycemic dose as follows-   Antihyperglycemics (From admission, onward)        Start     Stop Route Frequency Ordered     03/05/24 1145   insulin regular in 0.9 % NaCl 100 unit/100 mL (1 unit/mL) infusion        Question Answer Comment   Initial dose (DO NOT CHANGE): 0.1 units/kg/hr     Insulin Rate Adjustment (DO NOT MODIFY ANSWER) [email or web address]\epic\Images\Pharmacy\InsulinInfusions\INSULIN ADJUSTMENT DKA version [id number]         -- IV Continuous 03/05/24 1145             Hold Oral hypoglycemics while patient is in the hospital.  Will keep patient on DKA pathway with insulin drip and fluid with protocol in place for switching to subcutaneous insulin as anion gap closes.        DKA  HHS  - DKA/HHS Pathway initiated  - Blood sugar on arrival 677 with a bicarb 29.7, anion gap 14, BHB 1.4 and pH 7.3  - HbA1c 14  - Likely induced by lack of medication and/or infection  - Home DM regimen:  Insulin and Glipizide  - Start insulin gtt per protocol with q1h accuchecks while on the drip.    - Once Bicarb >18, Anion gap <10, Glucose <200 on 2 readings and able to tolerate PO intake without nausea and vomiting, transition to subq insulin with a 1-2 h overlap with drip.    Long acting insulin dose:  Detemir (0.25mg/kg) daily or in 2 doses  Short acting insulin dose:   Aspart (0.08mg/kg) units per meal  - Start normal saline at 125cc/hr.  Once blood sugar is 200, change IVF to D5 1/2 NS at 50cc/h  - Monitor electrolytes with BMP q4h while on insulin gtt and place on tele  - Bariatric clear liquid diet while on insulin gtt then change to Diabetic diet when initiating subq insulin with accuchecks 4x daily before meals and at bedtime (AC and HS)  - Endocrine consult            CKD (chronic kidney disease) stage 3, GFR 30-59 ml/min  Creatine stable for now. BMP reviewed- noted Estimated Creatinine Clearance: 45.4 mL/min (A) (based on SCr of 1.8 mg/dL (H)). according to latest data. Based on current GFR, CKD stage is stage 3 - GFR 30-59.  Monitor UOP and serial BMP and adjust therapy as needed. Renally dose meds. Avoid nephrotoxic medications and procedures.     Osteomyelitis  Patient has a history of chronic osteomyelitis. On this admission he is coming in with extensive foul smelling ulcerative lesions on his left leg with possible extension into his bone.     -MRI left leg without contrast  -Wound culture  -Inpatient consult to wound care  -Inpatient consult to podiatry  -Continue empiric I/V antibiotics and follow up cultures.  -Inpatient consult to ID        Severe sepsis  This patient does have evidence of infective focus  My overall impression is sepsis.  Source: Skin and Soft Tissue (location left leg)  Antibiotics given-   Antibiotics (72h ago, onward)        Start     Stop Route Frequency Ordered     03/05/24 1756   vancomycin - pharmacy to dose  (vancomycin IVPB (PEDS and ADULTS))        See Hyperspace for full Linked Orders Report.    -- IV pharmacy to manage frequency 03/05/24 1656     03/05/24 1730   meropenem (MERREM) 2 g in sodium chloride 0.9% 100 mL IVPB         -- IV Every 12 hours (non-standard times) 03/05/24 1618             Latest lactate reviewed-       Recent Labs   Lab 03/05/24  0909 03/05/24  1223   LACTATE  --  3.1*   POCLAC 4.61*  --       Organ  dysfunction indicated by Acute kidney injury     Fluid challenge Actual Body weight- Patient will receive 30ml/kg actual body weight to calculate fluid bolus for treatment of septic shock.      Post- resuscitation assessment No - Post resuscitation assessment not needed         Will Not start Pressors- Levophed for MAP of 65  -Trend lactate  -Inpatient consult to ID  -Continue IV antibiotics  -Follow up blood cultures        Elevated troponin  Patient had elevated troponin at presentation 0.043, will continue to trend.        Hyperlipidemia  Continue home atorvastatin        Paroxysmal atrial fibrillation  Patient with Persistent (7 days or more) atrial fibrillation which is uncontrolled currently with    . Patient is currently in sinus rhythm.[id number] Score: 4. . Anticoagulation indicated. Anticoagulation done with heparin .     Peripheral arterial disease  Hold plavix in the setting of sepsis        Essential hypertension  Chronic, uncontrolled. Latest blood pressure and vitals reviewed-      Temp:  [98.4 °F (36.9 °C)]   Pulse:  []   Resp:  [14-22]   BP: (136-182)/(65-78)   SpO2:  [93 %-99 %] .   Home meds for hypertension were reviewed and noted below.   Hypertension Medications                    furosemide (LASIX) 40 MG tablet Take 20 mg by mouth.     hydrALAZINE (APRESOLINE) 100 MG tablet Take 1 tablet (100 mg total) by mouth every 8 (eight) hours.     isosorbide dinitrate (ISORDIL) 10 MG tablet Take 1 tablet (10 mg total) by mouth 3 (three) times daily.     NIFEdipine (PROCARDIA-XL) 60 MG (OSM) 24 hr tablet Take 1 tablet (60 mg total) by mouth once daily.                While in the hospital, will manage blood pressure as follows; Adjust home antihypertensive regimen as follows- will keep hydralazine but hold valsartan and lasix     Will utilize p.r.n. blood pressure medication only if patient's blood pressure greater than 180/110 and he develops symptoms such as worsening chest pain or shortness of  breath.        VTE Risk Mitigation (From admission, onward)              Ordered       heparin 25,000 units in dextrose 5% (100 units/ml) IV bolus from bag LOW INTENSITY nomogram - [facility]  Once        Question:  Heparin Infusion Adjustment (DO NOT MODIFY ANSWER)  Answer:  [email or web address]\epic\Images\Pharmacy\HeparinInfusions\heparin LOW INTENSITY nomogram for [facility] [id number]    03/05/24 1718       heparin 25,000 units in dextrose 5% 250 mL (100 units/mL) infusion LOW INTENSITY nomogram - [facility]  Continuous        Question:  Begin at (units/kg/hr)  Answer:  12    03/05/24 1718       IP VTE HIGH RISK PATIENT  Once         03/05/24 1145       Place NADYA hose  Until discontinued         03/05/24 1145       Place sequential compression device  Until discontinued         03/05/24 1145                                            Lynette Perkins MD  Department of Hospital Medicine  [facility] - Emergency Dept                          Significant Labs: All pertinent labs within the past 24 hours have been reviewed.    Significant Imaging: I have reviewed all pertinent imaging results/findings within the past 24 hours.    Assessment/Plan:      * DKA (diabetic ketoacidosis)  Patient's FSGs are uncontrolled due to hyperglycemia on current medication regimen.  Last A1c reviewed-   Lab Results   Component Value Date    HGBA1C >14.0 (H) 03/05/2024     Most recent fingerstick glucose reviewed-   Recent Labs   Lab 03/06/24  0922 03/06/24  1058 03/06/24  1110 03/06/24  1217   POCTGLUCOSE 182* 207* 193* 217*       Current correctional scale   Insulin drip  Maintain anti-hyperglycemic dose as follows-   Antihyperglycemics (From admission, onward)      Start     Stop Route Frequency Ordered    03/06/24 0715  insulin aspart U-100 pen 7 Units         -- SubQ 3 times daily with meals 03/05/24 1714    03/06/24 0045  insulin regular in 0.9 % NaCl 100 unit/100 mL (1 unit/mL) infusion        Question Answer Comment   Initial dose (DO NOT CHANGE): 0.1  units/kg/hr    Insulin Rate Adjustment (DO NOT MODIFY ANSWER) [email or web address]\epic\Images\Pharmacy\InsulinInfusions\INSULIN ADJUSTMENT DKA version [id number].pdf        -- IV Continuous 03/06/24 0041    03/05/24 1800  insulin detemir U-100 (Levemir) pen 30 Units         -- SubQ Nightly 03/05/24 1713          Hold Oral hypoglycemics while patient is in the hospital.  Will keep patient on DKA pathway with insulin drip and fluid with protocol in place for switching to subcutaneous insulin as anion gap closes.      DKA  HHS  - DKA/HHS Pathway initiated  - Blood sugar on arrival 677 with a bicarb 29.7, anion gap 14, BHB 1.4 and pH 7.3  - HbA1c 14  - Likely induced by lack of medication and/or infection  - Home DM regimen:  Insulin and Glipizide  - Start insulin gtt per protocol with q1h accuchecks while on the drip.    - Once Bicarb >18, Anion gap <10, Glucose <200 on 2 readings and able to tolerate PO intake without nausea and vomiting, transition to subq insulin with a 1-2 h overlap with drip.    Long acting insulin dose:  Detemir (0.25mg/kg) daily or in 2 doses  Short acting insulin dose:  Aspart (0.08mg/kg) units per meal  - Start normal saline at 125cc/hr.  Once blood sugar is 200, change IVF to D5 1/2 NS at 50cc/h  - Monitor electrolytes with BMP q4h while on insulin gtt and place on tele  - Bariatric clear liquid diet while on insulin gtt then change to Diabetic diet when initiating subq insulin with accuchecks 4x daily before meals and at bedtime (AC and HS)  - Endocrine consult         CKD (chronic kidney disease) stage 3, GFR 30-59 ml/min  Creatine stable for now. BMP reviewed- noted Estimated Creatinine Clearance: 51.1 mL/min (A) (based on SCr of 1.6 mg/dL (H)). according to latest data. Based on current GFR, CKD stage is stage 3 - GFR 30-59.  Monitor UOP and serial BMP and adjust therapy as needed. Renally dose meds. Avoid nephrotoxic medications and procedures.    Osteomyelitis  Patient has a history of chronic  osteomyelitis. On this admission he is coming in with extensive foul smelling ulcerative lesions on his left leg with possible extension into his bone.    -MRI left leg without contrast  -Wound culture  -Inpatient consult to wound care  -Inpatient consult to podiatry  -Continue empiric I/V antibiotics and follow up cultures.  -Inpatient consult to ID      Severe sepsis  This patient does have evidence of infective focus  My overall impression is sepsis.  Source: Skin and Soft Tissue (location left leg)  Antibiotics given-   Antibiotics (72h ago, onward)      None          Latest lactate reviewed-  Recent Labs   Lab 03/05/24  0909 03/05/24  1223 03/06/24  0847   LACTATE  --    < > 2.2   POCLAC 4.61*  --   --     < > = values in this interval not displayed.       Organ dysfunction indicated by Acute kidney injury    Fluid challenge Actual Body weight- Patient will receive 30ml/kg actual body weight to calculate fluid bolus for treatment of septic shock.     Post- resuscitation assessment No - Post resuscitation assessment not needed       Will Not start Pressors- Levophed for MAP of 65  -Trend lactate  -Inpatient consult to ID  -Continue IV antibiotics  -Follow up blood cultures      Elevated troponin  Patient had elevated troponin at presentation 0.043, will continue to trend.      Hyperlipidemia  Continue home atorvastatin      Paroxysmal atrial fibrillation  Patient with Persistent (7 days or more) atrial fibrillation which is uncontrolled currently with    . Patient is currently in sinus rhythm.[id number] Score: 4. . Anticoagulation indicated. Anticoagulation done with lovenox as we could not get IV access for heparin  .    Peripheral arterial disease  Hold plavix in the setting of sepsis      Essential hypertension  Chronic, uncontrolled. Latest blood pressure and vitals reviewed-     Temp:  [98.2 °F (36.8 °C)-98.9 °F (37.2 °C)]   Pulse:  []   Resp:  [15-27]   BP: (115-170)/(59-92)   SpO2:  [95 %-100 %] .    Home meds for hypertension were reviewed and noted below.   Hypertension Medications               furosemide (LASIX) 40 MG tablet Take 20 mg by mouth.    hydrALAZINE (APRESOLINE) 100 MG tablet Take 1 tablet (100 mg total) by mouth every 8 (eight) hours.    isosorbide dinitrate (ISORDIL) 10 MG tablet Take 1 tablet (10 mg total) by mouth 3 (three) times daily.    NIFEdipine (PROCARDIA-XL) 60 MG (OSM) 24 hr tablet Take 1 tablet (60 mg total) by mouth once daily.            While in the hospital, will manage blood pressure as follows; Adjust home antihypertensive regimen as follows- will keep hydralazine but hold valsartan and lasix    Will utilize p.r.n. blood pressure medication only if patient's blood pressure greater than 180/110 and he develops symptoms such as worsening chest pain or shortness of breath.      VTE Risk Mitigation (From admission, onward)           Ordered     enoxaparin injection 120 mg  Every 12 hours         03/05/24 2121                    Discharge Planning   OXANA: 3/11/2024     Code Status: Full Code   Is the patient medically ready for discharge?: No    Reason for patient still in hospital (select all that apply): Treatment  Discharge Plan A: Long-term acute care facility (LTAC)                  Lynette Perkins MD  Department of Hospital Medicine   [facility] - Telemetry Stepdown

## 2024-03-06 NOTE — PLAN OF CARE
Discharge Planning Assessment:  Patient admitted on: 3-5-24  Chart reviewed, Care plan discussed with ER treatment team  Consults: case mgt., podiatry, wound care, Inf disease, PT, OT, [facility] team    Current Dispo: pending, LTAC vs Snf  Home environment does not seem adequate from his presentation thus far, no family at the bedside  Attempted to discuss post acute plans, Mr Castañeda keeps falling asleep mid sentence  See ER labs attached below:          I-STAT Chem-8+ Results:    Value Reference Range   Sodium 135 136-145 mmol/L   Potassium  3.4 3.5-5.1 mmol/L   Chloride 91  mmol/L   Ionized Calcium 1.12 1.06-1.42 mmol/L   CO2 (measured) 30 23-29 mmol/L   Glucose 634  mg/dL   BUN 29 6-30 mg/dL   Creatinine 1.9 0.5-1.4 mg/dL   Hematocrit 31 36-54%            Case management to follow tomorrow

## 2024-03-06 NOTE — HPI
73-year-old male with T2 dm, HLD, HTN, chronic osteomyelitis of the left calcaneus, left TMA, PAD presented EMS from home with hyperglycemia, greater than 600.  Patient had an appointment to follow up in the Podiatry Clinic with Dr. Wilson that he hoped to make but is now admitted to  service for DKA. Infectious disease consulted for further guidance his antibiotics.  Patient also noted that since his last visit with Podiatry and Infectious Disease in late January he did not receive any wound care.  Pt states his lower extremity wounds appear erythematous, and is concerned for active infection and possible underlying osteomyelitis. Podiatry consulted for foot wounds.    At time of initial encounter patient's vital signs were stable, afebrile, SpO2 100% on RA.  Currently no leukocytosis white count downtrending at 10.65 from 14.6 yesterday.  CRP is 184.  When and creatinine are elevated.  GFR is decreased.  Albumin low 9.  Troponin I elevated.  Moderate amount of bacteria on UA.  L Tib Fib MRI pending.  On exam, patient very somnolent and unable to give tangible answers.  Brother present in room, and relays most of history.  Brother states for the past couple of weeks has left lower extremity TMA site wound has been exuding purulence.  Patient is nonambulatory.  Uses a wheelchair to get around.

## 2024-03-06 NOTE — CONSULTS
Nutrition-Related Diabetes Education      Time Spent: 5 minutes    Learners: Patient    Current HbA1c: >14    Is patient aware of their A1c and their goal A1c? Yes    Nutrition Education with handouts: MyPlate, CHO counting     Comments: Pt familiar with diabetic diet. Provided and explained handout detailing sources of carbohydrates, appropriate serving sizes, and the plate method for meal planning. Pt voiced understanding. All questions and concerns answered.    Barriers to Learning: Non-compliance     Pt reports good appetite PTA/currently & UBW of 270# - appears nourished w/ no indicators of malnutrition.     Follow up: Yes    Please consult as needed.    Thank you!  Pilar, MS, RD, LDN

## 2024-03-06 NOTE — ASSESSMENT & PLAN NOTE
This patient does have evidence of infective focus  My overall impression is sepsis.  Source: Skin and Soft Tissue (location left leg)  Antibiotics given-   Antibiotics (72h ago, onward)    None        Latest lactate reviewed-  Recent Labs   Lab 03/05/24  0909 03/05/24  1223 03/06/24  0847   LACTATE  --    < > 2.2   POCLAC 4.61*  --   --     < > = values in this interval not displayed.       Organ dysfunction indicated by Acute kidney injury    Fluid challenge Actual Body weight- Patient will receive 30ml/kg actual body weight to calculate fluid bolus for treatment of septic shock.     Post- resuscitation assessment No - Post resuscitation assessment not needed       Will Not start Pressors- Levophed for MAP of 65  -Trend lactate  -Inpatient consult to ID  -Continue IV antibiotics  -Follow up blood cultures

## 2024-03-06 NOTE — SUBJECTIVE & OBJECTIVE
Interval History: overnight it was difficult to get an IV access and patient had anion gap closed and BG was lowered therefore insulin drip was stopped and transitioned to subcutaneous insulin.      Review of Systems    Constitutional:  Positive for activity change, appetite change, chills and fatigue.   Respiratory:  Positive for shortness of breath. Negative for cough.    Cardiovascular:  Positive for leg swelling. Negative for chest pain.   Gastrointestinal:  Negative for abdominal pain, diarrhea, nausea and vomiting.   Genitourinary:  Positive for dysuria. Negative for frequency and urgency.   Neurological:  Positive for weakness. Negative for seizures, light-headedness and numbness.   Psychiatric/Behavioral:  Positive for decreased concentration. Negative for agitation and behavioral problems.   Objective:     Vital Signs (Most Recent):  Temp: 98.3 °F (36.8 °C) (03/06/24 1114)  Pulse: 100 (03/06/24 1142)  Resp: 15 (03/06/24 1114)  BP: (!) 149/70 (03/06/24 1114)  SpO2: 100 % (03/06/24 1114) Vital Signs (24h Range):  Temp:  [98.2 °F (36.8 °C)-98.9 °F (37.2 °C)] 98.3 °F (36.8 °C)  Pulse:  [] 100  Resp:  [15-27] 15  SpO2:  [95 %-100 %] 100 %  BP: (115-170)/(59-92) 149/70     Weight: 120.2 kg (265 lb)  Body mass index is 39.13 kg/m².    Intake/Output Summary (Last 24 hours) at 3/6/2024 1321  Last data filed at 3/6/2024 0905  Gross per 24 hour   Intake 120 ml   Output --   Net 120 ml         Physical Exam     Constitutional:       Appearance: He is obese. He is ill-appearing.      Comments: lethargic   Cardiovascular:      Rate and Rhythm: Regular rhythm. Tachycardia present.      Pulses: Normal pulses.      Heart sounds: Normal heart sounds.   Pulmonary:      Effort: Pulmonary effort is normal.      Breath sounds: Normal breath sounds.   Musculoskeletal:         General: Swelling present.      Comments: Patient has very dry bark like skin of both legs, left foot amputation, with left leg wounds, he has  multiple large wounds with purulent foul smelling abscess and ulceration.   Neurological:      General: No focal deficit present.      Mental Status: He is oriented to person, place, and time.                  Significant Labs: All pertinent labs within the past 24 hours have been reviewed.     Significant Imaging: I have reviewed all pertinent imaging results/findings within the past 24 hours.  Assessment/Plan:      * DKA (diabetic ketoacidosis)  Patient's FSGs are uncontrolled due to hyperglycemia on current medication regimen.  Last A1c reviewed-         Lab Results   Component Value Date     HGBA1C >14.0 (H) 03/05/2024      Most recent fingerstick glucose reviewed-          Recent Labs   Lab 03/05/24  1246 03/05/24  1345 03/05/24  1441 03/05/24  1546   POCTGLUCOSE >500* 412* 333* 139*      Current correctional scale   Insulin drip  Maintain anti-hyperglycemic dose as follows-   Antihyperglycemics (From admission, onward)        Start     Stop Route Frequency Ordered     03/05/24 1145   insulin regular in 0.9 % NaCl 100 unit/100 mL (1 unit/mL) infusion        Question Answer Comment   Initial dose (DO NOT CHANGE): 0.1 units/kg/hr     Insulin Rate Adjustment (DO NOT MODIFY ANSWER) [email or web address]\[email or web address]\Images\Pharmacy\InsulinInfusions\INSULIN ADJUSTMENT DKA version [id number].[email or web address]         -- IV Continuous 03/05/24 1145             Hold Oral hypoglycemics while patient is in the hospital.  Will keep patient on DKA pathway with insulin drip and fluid with protocol in place for switching to subcutaneous insulin as anion gap closes.        DKA  HHS  - DKA/HHS Pathway initiated  - Blood sugar on arrival 677 with a bicarb 29.7, anion gap 14, BHB 1.4 and pH 7.3  - HbA1c 14  - Likely induced by lack of medication and/or infection  - Home DM regimen:  Insulin and Glipizide  - Start insulin gtt per protocol with q1h accuchecks while on the drip.    - Once Bicarb >18, Anion gap <10, Glucose <200 on 2 readings and able to tolerate PO  intake without nausea and vomiting, transition to subq insulin with a 1-2 h overlap with drip.    Long acting insulin dose:  Detemir (0.25mg/kg) daily or in 2 doses  Short acting insulin dose:  Aspart (0.08mg/kg) units per meal  - Start normal saline at 125cc/hr.  Once blood sugar is 200, change IVF to D5 1/2 NS at 50cc/h  - Monitor electrolytes with BMP q4h while on insulin gtt and place on tele  - Bariatric clear liquid diet while on insulin gtt then change to Diabetic diet when initiating subq insulin with accuchecks 4x daily before meals and at bedtime (AC and HS)  - Endocrine consult            CKD (chronic kidney disease) stage 3, GFR 30-59 ml/min  Creatine stable for now. BMP reviewed- noted Estimated Creatinine Clearance: 45.4 mL/min (A) (based on SCr of 1.8 mg/dL (H)). according to latest data. Based on current GFR, CKD stage is stage 3 - GFR 30-59.  Monitor UOP and serial BMP and adjust therapy as needed. Renally dose meds. Avoid nephrotoxic medications and procedures.     Osteomyelitis  Patient has a history of chronic osteomyelitis. On this admission he is coming in with extensive foul smelling ulcerative lesions on his left leg with possible extension into his bone.     -MRI left leg without contrast  -Wound culture  -Inpatient consult to wound care  -Inpatient consult to podiatry  -Continue empiric I/V antibiotics and follow up cultures.  -Inpatient consult to ID        Severe sepsis  This patient does have evidence of infective focus  My overall impression is sepsis.  Source: Skin and Soft Tissue (location left leg)  Antibiotics given-   Antibiotics (72h ago, onward)        Start     Stop Route Frequency Ordered     03/05/24 1756   vancomycin - pharmacy to dose  (vancomycin IVPB (PEDS and ADULTS))        See Hyperspace for full Linked Orders Report.    -- IV pharmacy to manage frequency 03/05/24 1656     03/05/24 1730   meropenem (MERREM) 2 g in sodium chloride 0.9% 100 mL IVPB         -- IV Every 12  hours (non-standard times) 03/05/24 1618             Latest lactate reviewed-       Recent Labs   Lab 03/05/24  0909 03/05/24  1223   LACTATE  --  3.1*   POCLAC 4.61*  --       Organ dysfunction indicated by Acute kidney injury     Fluid challenge Actual Body weight- Patient will receive 30ml/kg actual body weight to calculate fluid bolus for treatment of septic shock.      Post- resuscitation assessment No - Post resuscitation assessment not needed         Will Not start Pressors- Levophed for MAP of 65  -Trend lactate  -Inpatient consult to ID  -Continue IV antibiotics  -Follow up blood cultures        Elevated troponin  Patient had elevated troponin at presentation 0.043, will continue to trend.        Hyperlipidemia  Continue home atorvastatin        Paroxysmal atrial fibrillation  Patient with Persistent (7 days or more) atrial fibrillation which is uncontrolled currently with    . Patient is currently in sinus rhythm.[id number] Score: 4. . Anticoagulation indicated. Anticoagulation done with heparin .     Peripheral arterial disease  Hold plavix in the setting of sepsis        Essential hypertension  Chronic, uncontrolled. Latest blood pressure and vitals reviewed-      Temp:  [98.4 °F (36.9 °C)]   Pulse:  []   Resp:  [14-22]   BP: (136-182)/(65-78)   SpO2:  [93 %-99 %] .   Home meds for hypertension were reviewed and noted below.   Hypertension Medications                    furosemide (LASIX) 40 MG tablet Take 20 mg by mouth.     hydrALAZINE (APRESOLINE) 100 MG tablet Take 1 tablet (100 mg total) by mouth every 8 (eight) hours.     isosorbide dinitrate (ISORDIL) 10 MG tablet Take 1 tablet (10 mg total) by mouth 3 (three) times daily.     NIFEdipine (PROCARDIA-XL) 60 MG (OSM) 24 hr tablet Take 1 tablet (60 mg total) by mouth once daily.                While in the hospital, will manage blood pressure as follows; Adjust home antihypertensive regimen as follows- will keep hydralazine but hold valsartan  and lasix     Will utilize p.r.n. blood pressure medication only if patient's blood pressure greater than 180/110 and he develops symptoms such as worsening chest pain or shortness of breath.        VTE Risk Mitigation (From admission, onward)              Ordered       heparin 25,000 units in dextrose 5% (100 units/ml) IV bolus from bag LOW INTENSITY nomogram - [facility]  Once        Question:  Heparin Infusion Adjustment (DO NOT MODIFY ANSWER)  Answer:  [email or web address]\Images\Pharmacy\HeparinInfusions\heparin LOW INTENSITY nomogram for [facility] [id number].pdf    03/05/24 1718       heparin 25,000 units in dextrose 5% 250 mL (100 units/mL) infusion LOW INTENSITY nomogram - [facility]  Continuous        Question:  Begin at (units/kg/hr)  Answer:  12    03/05/24 1718       IP VTE HIGH RISK PATIENT  Once         03/05/24 1145       Place ANDYA hose  Until discontinued         03/05/24 1145       Place sequential compression device  Until discontinued         03/05/24 1145                                            Lynette Perkins MD  Department of Hospital Medicine  [facility] - Emergency Dept                          Significant Labs: All pertinent labs within the past 24 hours have been reviewed.    Significant Imaging: I have reviewed all pertinent imaging results/findings within the past 24 hours.

## 2024-03-06 NOTE — ASSESSMENT & PLAN NOTE
Patient with Persistent (7 days or more) atrial fibrillation which is uncontrolled currently with    . Patient is currently in sinus rhythm.[id number] Score: 4. . Anticoagulation indicated. Anticoagulation done with lovenox as we could not get IV access for heparin  .

## 2024-03-06 NOTE — HPI
73 y/o male with a.fib, DMII uncontrolled, HLD, HTN, chronic heel ulcerations c/b chronic osteomyelitis, hx of  Acinetobacter and ESBL klebsiella admitted for DKA. ID consulted for chronic wounds/ulcerations and abx guidance as with hx of MDROs.  ID followed and rec 6 weeks of IV Ertapenem and 6 months of Voriconazole for LLE cultures showing Proteus M/E cloacae and Fusarium/C Albicans respectively on cultures.    3/19 spike fever 102.3.  Blood cultures sent and are NGTD.  Developed urinary retention and fung placed - UA negative.  CXR done showing: lung zones stable, no significant new areas of consolidation or volume loss.  US UEs 3/14 showed RUE brachial DVT and is on heparin and clopidogrel.  ID consulted for fevers despite abx/antifungals.  Patient seen and denies symptoms when had fever.  LLE pain stable.  Patient had declined vasc sx rec of AKA/BKA but now is amenable and is scheduled for 3/27.  The patient denies any recent fever, chills, or sweats.

## 2024-03-06 NOTE — PT/OT/SLP EVAL
Occupational Therapy  Co -  Evaluation and Treatment   Co-evaluation/treatment performed due to patient's multiple deficits requiring two skilled therapists to appropriately and safely assess patient's strength and endurance while facilitating functional tasks in addition to accommodating for patient's activity tolerance.       Name: Saji Castañeda  MRN: [id number]  Admitting Diagnosis: DKA (diabetic ketoacidosis)  Recent Surgery: * No surgery found *      Recommendations:     Discharge Recommendations: Moderate Intensity Therapy  Discharge Equipment Recommendations:  slide board, wound care supplies, walker, rolling, bedside commode  DME Justification:   Pt needs RW to ambulate, as their mobility limitation significantly impairs their ability to participate in one or more activities of daily living. The use of a RW will significantly improve the patient's ability to participate in ADLs and the patient will use it on a regular basis in the home. A cane is not sufficient for the patient's safe functional mobility.   Barriers to discharge:   increased skilled A    Assessment:     Saji Castañeda is a 74 y.o. male with a medical diagnosis of DKA (diabetic ketoacidosis).  He presents with performance deficits affecting function: weakness, impaired endurance, impaired self care skills, impaired functional mobility, gait instability, impaired balance, decreased coordination, decreased upper extremity function, decreased lower extremity function, pain, decreased safety awareness, decreased ROM, impaired coordination, impaired skin, impaired fine motor, impaired cardiopulmonary response to activity, impaired muscle length.      Pt encountered sitting with HOB raised and eating breakfast, pleasant and agreeable to therapy. Pt has history of being wc bound, and multiple falls in past week during transfers bed <> wc transfers 2* possible BUE weakness.  RN note from 3/5 indicates bedbug precautions.    Pt presents with wounds on  "BLE and gluteal area which limited pt's mobility this date. Throughout session, pt's BP measured at the following:     Rest with HOB raised:  168/70  Sitting EOB:    162/69  Rest with HOB raised:  139/66    Pt encountered with HR of 108, remained in 105-111 range throughout session.  Pt required max A x2 for bed mobility, and was able to sit EOB with CGA to min.  Pt unable to progress to stand this date 2* blood pressure, wounds, and pain. Session prolonged as RN entered for wound care check, pt positioned accordingly, lowered from sitting EOB to R sidelying. Pt required posterior pericare for BM with refreshed rafiq pads. Pt returned to rest with HOB raised, handed off to pharmacist.     Patient currently demonstrates a need for moderate intensity therapy on a daily basis post acute secondary to a decline in functional status due to illness.       Rehab Prognosis: Fair; patient would benefit from acute skilled OT services to address these deficits and reach maximum level of function.       Plan:     Patient to be seen 3 x/week to address the above listed problems via self-care/home management, therapeutic activities, therapeutic exercises  Plan of Care Expires: 04/06/24  Plan of Care Reviewed with: patient    Subjective     Chief Complaint: "My legs hurt"  Patient/Family Comments/goals: Get better, return home     Occupational Profile:  Living Environment: Pt lives with brother and sister in [facility] with small threshold step. Maxine in bathroom, gb at toilet.  Previous level of function: Pt has been giving himself sponge baths, is able to self-propel wheelchair. Pt transfers himself to/from  but has had recent falls  Roles and Routines: brother. Pt enjoys using wheelchair to visit folks in community  Equipment Used at Home: wheelchair, grab bar  Assistance upon Discharge: brother present at home, unclear how much assistance brother can provide    Pain/Comfort:  Pain Rating 1: 10/10  Location - Side 1: " Bilateral  Location - Orientation 1: lower  Location 1: leg  Pain Addressed 1: Reposition, Distraction, Nurse notified  Pain Rating Post-Intervention 1:  not quantified  Pain Rating 2:  not quantified, winced and groaned with movement  Location - Side 2: Bilateral  Location - Orientation 2: generalized  Location 2: gluteal wounds  Pain Addressed 2: Distraction, Cessation of Activity, Nurse notified, Reposition  Pain Rating Post-Intervention 2:  not quantified    Patients cultural, spiritual, Christian conflicts given the current situation: no    Objective:     Communicated with: BEREKET Miller prior to session.  Patient found HOB elevated with PureWick, telemetry, pulse ox (continuous) (midline) upon OT entry to room.    General Precautions: Standard, fall, diabetic, contact  Orthopedic Precautions: N/A  Braces: N/A  Respiratory Status: Room air    Occupational Performance:    Bed Mobility:    Patient completed Rolling/Turning to Left with  maximal assistance and 2 persons across 2 trials  Patient completed Rolling/Turning to Right with maximal assistance and 2 persons across 2 trials  Pt completed R sidelying during wound care and posterior pericare ~7min with max A  Patient completed Scooting/Bridging with maximal assistance and 2 persons  Patient completed Supine to Sit with maximal assistance and 2 persons  Patient completed Sit to Supine with maximal assistance and 2 persons  Pt sat EOB ~10min with CGA to SBA during static sitting, mod A during dynamic sitting with posterior lean     Functional Mobility/Transfers:  Not attempted this date 2* BP and HR    Activities of Daily Living:  Feeding:  independence pt encountered feeding self from table top tray  Grooming: set up assistance washing face with warm washcloth  Upper Body Dressing: maximal assistance donning 2nd gown as robe while navigating lines, total A doffing 2nd gown  Lower Body Dressing: total assistance donning/doffing bilateral socks while PT assisted  with bilateral leg lifts  Toileting: total assistance posterior pericare, total A managing Purewick    Cognitive/Visual Perceptual:  Cognitive/Psychosocial Skills:     -       Oriented to: AOx4   -       Follows Commands/attention:Follows two-step commands  -       Communication: clear/fluent  -       Memory: Poor immediate recall  -       Safety awareness/insight to disability: impaired   -       Mood/Affect/Coping skills/emotional control: Anxious and Pleasant  Visual/Perceptual:      -Intact      Physical Exam:  Balance:    -       Sitting: Impaired, posterior lean with dynamic activity  Postural examination/scapula alignment:    -       Rounded shoulders  Skin integrity: Tear of LLE and Wound LLE, BLE, bilateral gluteal muscles  Edema:  Mild RLE  Sensation:    -       Intact  Motor Planning:    -       Intact  Dominant hand:    -       right  Upper Extremity Range of Motion:     -       Right Upper Extremity: Deficits: unable to flex shoulders >60deg  -       Left Upper Extremity: Deficits: unable to flex shoulders >60deg  Upper Extremity Strength:    -       Right Upper Extremity: Deficits: 4-/5  -       Left Upper Extremity: Deficits: 4-/5   Strength:    -       Right Upper Extremity: WFL  -       Left Upper Extremity: WFL  Fine Motor Coordination:    -       Intact  Neurological:    -       Intact    AMPAC 6 Click ADL:  AMPAC Total Score: 9    Treatment & Education:  Pt educated on role of OT, POC, and goals for therapy.    POC was dicussed with patient/caregiver, who was included in its development and is in agreement with the identified goals and treatment plan.   Patient and family aware of patient's deficits and therapy progression.   Time provided for therapeutic counseling and discussion of health disposition.   Educated on importance of EOB/OOB mobility, maintaining routine, sitting up in chair, and maximizing independence with ADLs during admission   Pt completed ADLs and functional mobility for  treatment session as noted above   Pt/caregiver verbalized understanding and expressed no further concerns/questions.  Updated communication board with level of assist required    Patient left HOB elevated with all lines intact, call button in reach, bed alarm on, RN notified, and pharmacist present    GOALS:   Multidisciplinary Problems       Occupational Therapy Goals          Problem: Occupational Therapy    Goal Priority Disciplines Outcome Interventions   Occupational Therapy Goal     OT, PT/OT Ongoing, Progressing    Description: Goals to be met by: 4/6/24     Patient will increase functional independence with ADLs by performing:    UE Dressing with Contact Guard Assistance.  LE Dressing with Maximum Assistance.  Grooming while EOB with Stand-by Assistance.  Toileting from bedside commode with Maximum Assistance for hygiene and clothing management.   Sitting at edge of bed x5 minutes with Stand-by Assistance.  Rolling to Bilateral with Minimal Assistance.   Supine to sit with Minimal Assistance.  Stand pivot transfers with Minimal Assistance.  Toilet transfer to bedside commode with Maximum Assistance.  BUE strengthening exercise program 2x day with theraband/HEP worksheet                         History:     Past Medical History:   Diagnosis Date    Arthritis     legs    Diabetes mellitus     Diabetes mellitus, type 2     Hyperlipidemia     Hypertension     Osteomyelitis     Palliative care encounter 5/24/2023         Past Surgical History:   Procedure Laterality Date    ANGIOGRAPHY OF LOWER EXTREMITY N/A 2/3/2021    Procedure: Angiogram Extremity Bilateral;  Surgeon: Ernst Chacko MD;  Location: [facility] OR [address] [place] [address];  Service: Peripheral Vascular;  Laterality: N/A;  7.4 mintues fluoroscopy time  816.15 mGy  170.17 Gycm2    AORTOGRAPHY WITH EXTREMITY RUNOFF Bilateral 2/3/2021    Procedure: AORTOGRAM, WITH EXTREMITY RUNOFF;  Surgeon: Ernst Chacko MD;  Location: [facility] OR [address] [place] [address];  Service: Peripheral  Vascular;  Laterality: Bilateral;    DEBRIDEMENT OF FOOT Left 2/23/2021    Procedure: DEBRIDEMENT, LEFT HEEL;  Surgeon: Mayra Schroeder DPM;  Location: [facility] OR [address] [place] [address];  Service: Podiatry;  Laterality: Left;    FOOT AMPUTATION  October 2010    left high midfoot amputation    IMPLANTATION OF LEADLESS PACEMAKER N/A 10/12/2023    Procedure: [id number];  Surgeon: VANESSA Delatorre MD;  Location: [facility] EP LAB;  Service: Cardiology;  Laterality: N/A;  AVB, MICRA, EH, ANES,  [id number]       Time Tracking:     OT Date of Treatment: 03/06/24  OT Start Time: 0931  OT Stop Time: 1015  OT Total Time (min): 44 min    Billable Minutes:Evaluation 8  Self Care/Home Management 22  Therapeutic Activity 14    3/6/2024

## 2024-03-06 NOTE — AI DETERIORATION ALERT
RAPID RESPONSE NURSE PROACTIVE ROUNDING NOTE       Time of Visit: 920    Admit Date: 3/5/2024  LOS: 1  Code Status: Full Code   Date of Visit: 2024  : 1950  Age: 74 y.o.  Sex: male  Race: Black or   Bed: 8078/8078 A:   MRN: [id number]  Was the patient discharged from an ICU this admission? No   Was the patient discharged from a PACU within last 24 hours? No   Did the patient receive conscious sedation/general anesthesia in last 24 hours? No  Was the patient in the ED within the past 24 hours? Yes  Was the patient on NIPPV within the past 24 hours? No   Attending Physician: Jm Rosa MD  Primary Service: [facility] 4   Time spent at the bedside: < 15 min    SITUATION    Notified by [employer] patient alert.  Reason for alert: AI alert  Called to evaluate the patient for  AI alert    BACKGROUND     Why is the patient in the hospital?: DKA (diabetic ketoacidosis)    Patient has a past medical history of Arthritis, Diabetes mellitus, Diabetes mellitus, type 2, Hyperlipidemia, Hypertension, Osteomyelitis, and Palliative care encounter.    Last Vitals:  Temp: 98.2 °F (36.8 °C) (755)  Pulse: 100 (755)  Resp: 27 (755)  BP: 122/63 (755)  SpO2: 100 % (755)    24 Hours Vitals Range:  Temp:  [98.2 °F (36.8 °C)-98.9 °F (37.2 °C)]   Pulse:  []   Resp:  [14-27]   BP: (115-172)/(59-92)   SpO2:  [95 %-100 %]     Labs:  Recent Labs     24  0900 24  0915 24  0847   WBC 14.59*  --  10.65   HGB 9.2*  --  9.7*   HCT 31.0* 31* 32.4*   *  --  327       Recent Labs     24  0900 24  1223 24  0016 24  0100 24  0846 24  0847   *   < > 141 143  --  141  141   K 3.4*   < > 3.3* 3.2*  --  3.9  3.9   CL 92*   < > 106 104  --  105  105   CO2 26   < > 28 29  --  24  24   BUN 29*   < > 25* 24*  --  25*  25*   CREATININE 2.1*   < > 1.7* 1.7*  --  1.6*  1.6*   *   < > 145* 123*  --  173*  173*   PHOS  2.8  --   --   --  1.8*  --    MG 2.2  --   --   --  2.1  --     < > = values in this interval not displayed.        Recent Labs     03/05/24  0909   PH 7.392   PCO2 48.8*   PO2 20*   HCO3 29.7*   POCSATURATED 30   BE 5*        ASSESSMENT    Physical Exam  Vitals and nursing note reviewed.   Pulmonary:      Effort: Pulmonary effort is normal.   Neurological:      Mental Status: He is alert.     Patient in bed working with PT. At baseline.     INTERVENTIONS    The patient was seen for Medical problem. Staff concerns included AI alert. The following interventions were performed: continuous pulse ox monitoring continued and continuous cardiac monitoring continued.    RECOMMENDATIONS    -maintain IV access  -Continuous telemetry  -lights on during the day and off during the night. To regulate days.      PROVIDER ESCALATION    Yes/No  No        Disposition: Remain in room 8078.    FOLLOW-UP    Rounding completed with charge BEREKET Gardner. updated on plan of care. Instructed to call the Rapid Response Nurse, Edwin Tanner RN at [phone] for additional questions or concerns.

## 2024-03-06 NOTE — PLAN OF CARE
Problem: Adult Inpatient Plan of Care  Goal: Plan of Care Review  Outcome: Ongoing, Progressing  Goal: Patient-Specific Goal (Individualized)  Outcome: Ongoing, Progressing  Goal: Absence of Hospital-Acquired Illness or Injury  Outcome: Ongoing, Progressing  Goal: Optimal Comfort and Wellbeing  Outcome: Ongoing, Progressing  Goal: Readiness for Transition of Care  Outcome: Ongoing, Progressing     Problem: Diabetic Ketoacidosis  Goal: Fluid and Electrolyte Balance with Absence of Ketosis  Outcome: Ongoing, Progressing     Problem: Diabetes Comorbidity  Goal: Blood Glucose Level Within Targeted Range  Outcome: Ongoing, Progressing     Problem: Adjustment to Illness (Sepsis/Septic Shock)  Goal: Optimal Coping  Outcome: Ongoing, Progressing     Problem: Bleeding (Sepsis/Septic Shock)  Goal: Absence of Bleeding  Outcome: Ongoing, Progressing     Problem: Glycemic Control Impaired (Sepsis/Septic Shock)  Goal: Blood Glucose Level Within Desired Range  Outcome: Ongoing, Progressing     Problem: Infection Progression (Sepsis/Septic Shock)  Goal: Absence of Infection Signs and Symptoms  Outcome: Ongoing, Progressing     Problem: Nutrition Impaired (Sepsis/Septic Shock)  Goal: Optimal Nutrition Intake  Outcome: Ongoing, Progressing     Problem: Infection  Goal: Absence of Infection Signs and Symptoms  Outcome: Ongoing, Progressing     Problem: Impaired Wound Healing  Goal: Optimal Wound Healing  Outcome: Ongoing, Progressing     Problem: Skin Injury Risk Increased  Goal: Skin Health and Integrity  Outcome: Ongoing, Progressing     Problem: Fall Injury Risk  Goal: Absence of Fall and Fall-Related Injury  Outcome: Ongoing, Progressing   Pt was admitted due to DKA and his blood sugar is currently being monitored throughout the shift his blood sugar has lowered from 500 to high 190s - 207 no higher no s/s of hypergylcemia pt does not have a good appetite he continues to only eat 25% of his meals safety precautions maintained.

## 2024-03-06 NOTE — SUBJECTIVE & OBJECTIVE
"Past Medical History:   Diagnosis Date    Arthritis     legs    Diabetes mellitus     Diabetes mellitus, type 2     Hyperlipidemia     Hypertension     Osteomyelitis     Palliative care encounter 5/24/2023       Past Surgical History:   Procedure Laterality Date    ANGIOGRAPHY OF LOWER EXTREMITY N/A 2/3/2021    Procedure: Angiogram Extremity Bilateral;  Surgeon: Ernst Chacko MD;  Location: [facility] OR [address] [place] [address];  Service: Peripheral Vascular;  Laterality: N/A;  7.4 mintues fluoroscopy time  816.15 mGy  170.17 Gycm2    AORTOGRAPHY WITH EXTREMITY RUNOFF Bilateral 2/3/2021    Procedure: AORTOGRAM, WITH EXTREMITY RUNOFF;  Surgeon: Ernst Chacko MD;  Location: [facility] OR [address] [place] [address];  Service: Peripheral Vascular;  Laterality: Bilateral;    DEBRIDEMENT OF FOOT Left 2/23/2021    Procedure: DEBRIDEMENT, LEFT HEEL;  Surgeon: Mayra Schroeder DPM;  Location: [facility] OR [address] [place] [address];  Service: Podiatry;  Laterality: Left;    FOOT AMPUTATION  October 2010    left high midfoot amputation    IMPLANTATION OF LEADLESS PACEMAKER N/A 10/12/2023    Procedure: HIKCFZCCA-UYNWARDES-KDXYSBEL;  Surgeon: VANESSA Delatorre MD;  Location: [facility] EP LAB;  Service: Cardiology;  Laterality: N/A;  AVB, MICRA, EH, ANES, RM [id number]       Review of patient's allergies indicates:  No Known Allergies    Medications:  Medications Prior to Admission   Medication Sig    acetaminophen (TYLENOL) 325 MG tablet Take 650 mg by mouth every 6 (six) hours as needed for Temperature greater than.    acidophilus-pectin, citrus 100 million cell-10 mg Cap Take 1 capsule by mouth 2 (two) times a day.    apixaban (ELIQUIS) 5 mg Tab Take 1 tablet (5 mg total) by mouth 2 (two) times daily.    ascorbic acid, vitamin C, (VITAMIN C) 500 MG tablet Take 500 mg by mouth 2 (two) times daily.    B COMPLEX-VITAMIN B12 tablet Take 1 tablet by mouth once daily.    BD AUTOSHIELD DUO PEN NEEDLE 30 gauge x 3/16" Ndle     BD ULTRA-FINE ADITYA PEN NEEDLE 32 gauge x 5/32" Ndle USE FOUR " "TIMES DAILY WITH MEALS AND NIGHTLY    blood sugar diagnostic (CONTOUR TEST STRIPS) Strp Inject 1 strip into the skin 2 (two) times daily before meals.    clopidogreL (PLAVIX) 75 mg tablet Take 1 tablet (75 mg total) by mouth once daily.    diphenhydrAMINE (BENADRYL) 25 mg capsule No directions listed on the patients medication list.    furosemide (LASIX) 40 MG tablet Take 20 mg by mouth.    gabapentin (NEURONTIN) 300 MG capsule Take 300 mg by mouth 2 (two) times daily.    glipiZIDE (GLUCOTROL) 10 MG tablet Take 20 mg by mouth 2 (two) times a day.    hydrALAZINE (APRESOLINE) 100 MG tablet Take 1 tablet (100 mg total) by mouth every 8 (eight) hours.    insulin aspart U-100 (NOVOLOG) 100 unit/mL (3 mL) InPn pen Inject 6 Units into the skin 3 (three) times daily with meals.    isosorbide dinitrate (ISORDIL) 10 MG tablet Take 1 tablet (10 mg total) by mouth 3 (three) times daily.    LANTUS SOLOSTAR U-100 INSULIN glargine 100 units/mL SubQ pen Inject 45 Units into the skin every evening. (Patient taking differently: Inject 45 Units into the skin once daily.)    miconazole NITRATE 2 % (MICOTIN) 2 % top powder Apply topically 2 (two) times daily.    MICROLET LANCET Misc     multivitamin (THERAGRAN) per tablet Take 1 tablet by mouth once daily.    NIFEdipine (PROCARDIA-XL) 60 MG (OSM) 24 hr tablet Take 1 tablet (60 mg total) by mouth once daily.    oxyCODONE 5 mg TbOr Take 1 tablet by mouth every 6 (six) hours as needed (Pain (Max 5 daily)).    pantoprazole (PROTONIX) 40 MG tablet Take 40 mg by mouth once daily. Before breakfast    pen needle, diabetic 32 gauge x 5/32" Ndle use as directed with insulin    rosuvastatin (CRESTOR) 40 MG Tab Take 40 mg by mouth once daily.    sodium hypochlorite 0.5 % (DAKIN'S SOLUTION) external solution Apply topically once daily.    tamsulosin (FLOMAX) 0.4 mg Cap Take 0.4 mg by mouth once daily.    triamcinolone acetonide 0.025% (KENALOG) 0.025 % Oint Apply topically 2 (two) times daily as " needed (to affected area).     Antibiotics (From admission, onward)      None          Antifungals (From admission, onward)      None          Antivirals (From admission, onward)      None             Immunization History   Administered Date(s) Administered    COVID-19, MRNA, LN-S, PF (MODERNA FULL 0.5 ML DOSE) 05/24/2021, 07/13/2021    Influenza - Trivalent (ADULT) 10/08/2020    PPD Test 12/09/2020, 03/02/2021, 06/21/2022    Pneumococcal Conjugate - 13 Valent 10/16/2018       Family History       Problem Relation (Age of Onset)    Cancer Brother    Diabetes Mother, Sister    Heart disease Mother    Stroke Sister          Social History     Socioeconomic History    Marital status: Single   Tobacco Use    Smoking status: Never    Smokeless tobacco: Never   Substance and Sexual Activity    Alcohol use: No     Comment: occassional    Drug use: No    Sexual activity: Not Currently   Social History Narrative    Not currently working; lives with family     Social Determinants of Health     Financial Resource Strain: Medium Risk (3/5/2024)    Overall Financial Resource Strain (CARDIA)     Difficulty of Paying Living Expenses: Somewhat hard   Food Insecurity: No Food Insecurity (3/5/2024)    Hunger Vital Sign     Worried About Running Out of Food in the Last Year: Never true     Ran Out of Food in the Last Year: Never true   Transportation Needs: No Transportation Needs (3/5/2024)    PRAPARE - Transportation     Lack of Transportation (Medical): No     Lack of Transportation (Non-Medical): No   Physical Activity: Inactive (3/5/2024)    Exercise Vital Sign     Days of Exercise per Week: 0 days     Minutes of Exercise per Session: 0 min   Stress: No Stress Concern Present (3/5/2024)    [employer] - Occupational Stress Questionnaire     Feeling of Stress : Only a little   Social Connections: Socially Isolated (3/5/2024)    Social Connection and Isolation Panel [NHANES]     Frequency of  Communication with Friends and Family: More than three times a week     Frequency of Social Gatherings with Friends and Family: Patient declined     Attends [employer] Services: Never     Active Member of Clubs or Organizations: No     Attends Club or Organization Meetings: Never     Marital Status: Never    Housing Stability: Low Risk  (3/5/2024)    Housing Stability Vital Sign     Unable to Pay for Housing in the Last Year: No     Number of Places Lived in the Last Year: 1     Unstable Housing in the Last Year: No     Review of Systems   Constitutional:  Positive for chills and fatigue. Negative for diaphoresis and fever.   HENT:  Negative for congestion, ear pain, nosebleeds, rhinorrhea and sore throat.    Eyes:  Negative for pain.   Respiratory:  Negative for cough, shortness of breath and wheezing.    Cardiovascular:  Positive for leg swelling. Negative for chest pain.   Gastrointestinal:  Negative for abdominal pain, constipation, diarrhea, nausea and vomiting.   Genitourinary:  Negative for dysuria, frequency and urgency.   Musculoskeletal:  Positive for arthralgias, gait problem and myalgias. Negative for back pain and neck pain.   Skin:  Positive for color change and wound.   Neurological:  Negative for weakness, numbness and headaches.     Objective:     Vital Signs (Most Recent):  Temp: 98.3 °F (36.8 °C) (03/06/24 1114)  Pulse: 100 (03/06/24 1142)  Resp: 15 (03/06/24 1114)  BP: (!) 149/70 (03/06/24 1114)  SpO2: 100 % (03/06/24 1114) Vital Signs (24h Range):  Temp:  [98.2 °F (36.8 °C)-98.9 °F (37.2 °C)] 98.3 °F (36.8 °C)  Pulse:  [] 100  Resp:  [15-27] 15  SpO2:  [95 %-100 %] 100 %  BP: (115-170)/(59-92) 149/70     Weight: 120.2 kg (265 lb)  Body mass index is 39.13 kg/m².    Estimated Creatinine Clearance: 51.1 mL/min (A) (based on SCr of 1.6 mg/dL (H)).     Physical Exam  Vitals and nursing note reviewed.   Constitutional:       General: He is not in acute distress.     Appearance: He is  well-developed. He is ill-appearing. He is not diaphoretic.   HENT:      Head: Normocephalic and atraumatic.   Eyes:      Pupils: Pupils are equal, round, and reactive to light.   Cardiovascular:      Rate and Rhythm: Normal rate and regular rhythm.      Heart sounds: Normal heart sounds. No murmur heard.     No friction rub. No gallop.   Pulmonary:      Effort: Pulmonary effort is normal. No respiratory distress.      Breath sounds: Normal breath sounds. No wheezing or rales.   Chest:      Chest wall: No tenderness.   Abdominal:      General: Bowel sounds are normal. There is no distension.      Palpations: There is no mass.      Tenderness: There is no abdominal tenderness. There is no guarding.   Musculoskeletal:         General: Swelling and signs of injury present. No tenderness or deformity. Normal range of motion.      Cervical back: Normal range of motion and neck supple.      Right lower leg: Edema present.      Left lower leg: Edema present.   Skin:     General: Skin is warm and dry.      Findings: Erythema (LLE) present. No bruising or rash.      Comments: Superficial wound of LLE, no purulent drainage   Neurological:      Mental Status: He is alert and oriented to person, place, and time.   Psychiatric:         Behavior: Behavior normal.         Thought Content: Thought content normal.         Judgment: Judgment normal.                Significant Labs: All pertinent labs within the past 24 hours have been reviewed.    Significant Imaging: I have reviewed all pertinent imaging results/findings within the past 24 hours.

## 2024-03-06 NOTE — ASSESSMENT & PLAN NOTE
73 y/o male with a.fib, DMII uncontrolled, HLD, HTN, chronic heel ulcerations c/b chronic osteomyelitis, hx of  Acinetobacter and ESBL klebsiella admitted for DKA. ID consulted for chronic wounds/ulcerations and abx guidance as with hx of MDROs. Remained afebrile, no leukocytosis. Pt was given dose of meropenem. Blood cultures remain NGTD.       Recommendations  Pt with chronic ulcerations/wounds without progression. Pt with LLE cellulitis, no purulent drainage from skin ulcerations. Would start empiric augmentin to treat for SSTI x 7-10 days. Recommend judicial leg elevation and wound care to ulcerations and wounds.   Would hold off on further imaging unless with progression of wounds/ulcerations or swelling  Podiatry f/u if concerns for osteomyelitis would recommend obtaining bone bipsy for path and cultures   Would avoid obtaining superficial swab cultures if without purulent drainage   Blood culture remain NGTD.     Discussed with ID staff

## 2024-03-06 NOTE — NURSING
Nurses Note -- 4 Eyes      3/6/2024   4:20 AM      Skin assessed during: Admit      [] No Altered Skin Integrity Present    []Prevention Measures Documented      [x] Yes- Altered Skin Integrity Present or Discovered   [] LDA Added if Not in Epic (Describe Wound)   [x] New Altered Skin Integrity was Present on Admit and Documented in LDA   [x] Wound Image Taken    Wound Care Consulted? Yes    Attending Nurse:  BEREKET Ferrell     Second RN/Staff Member:   BEREKET Renner

## 2024-03-06 NOTE — ASSESSMENT & PLAN NOTE
Chronic, uncontrolled. Latest blood pressure and vitals reviewed-     Temp:  [98.2 °F (36.8 °C)-98.9 °F (37.2 °C)]   Pulse:  []   Resp:  [15-27]   BP: (115-170)/(59-92)   SpO2:  [95 %-100 %] .   Home meds for hypertension were reviewed and noted below.   Hypertension Medications               furosemide (LASIX) 40 MG tablet Take 20 mg by mouth.    hydrALAZINE (APRESOLINE) 100 MG tablet Take 1 tablet (100 mg total) by mouth every 8 (eight) hours.    isosorbide dinitrate (ISORDIL) 10 MG tablet Take 1 tablet (10 mg total) by mouth 3 (three) times daily.    NIFEdipine (PROCARDIA-XL) 60 MG (OSM) 24 hr tablet Take 1 tablet (60 mg total) by mouth once daily.            While in the hospital, will manage blood pressure as follows; Adjust home antihypertensive regimen as follows- will keep hydralazine but hold valsartan and lasix    Will utilize p.r.n. blood pressure medication only if patient's blood pressure greater than 180/110 and he develops symptoms such as worsening chest pain or shortness of breath.

## 2024-03-06 NOTE — NURSING
Patient arrived to unit accu checks every hour, no insulin drip restarted at this time. NO apparent distress noted. Plan of care in place.

## 2024-03-06 NOTE — CONSULTS
[facility] - Telemetry Stepdown  Podiatry  Consult Note    Patient Name: Saji Castañeda  MRN: [id number]  Admission Date: 3/5/2024  Hospital Length of Stay: 1 days  Attending Physician: Jm Rosa MD  Primary Care Provider: [facility] -     Inpatient consult to Podiatry  Consult performed by: Brooks Trujillo DPM  Consult ordered by: Lynette Perkins MD  Reason for consult: b/l foot wounds, calcaneus OM        Subjective:     History of Present Illness:  73-year-old male with T2 dm, HLD, HTN, chronic osteomyelitis of the left calcaneus, left TMA, PAD presented EMS from home with hyperglycemia, greater than 600.  Patient had an appointment to follow up in the Podiatry Clinic with Dr. Wilson that he hoped to make but is now admitted to  service for DKA. Infectious disease consulted for further guidance his antibiotics.  Patient also noted that since his last visit with Podiatry and Infectious Disease in late January he did not receive any wound care.  Pt states his lower extremity wounds appear erythematous, and is concerned for active infection and possible underlying osteomyelitis. Podiatry consulted for foot wounds.    At time of initial encounter patient's vital signs were stable, afebrile, SpO2 100% on RA.  Currently no leukocytosis white count downtrending at 10.65 from 14.6 yesterday.  CRP is 184.  When and creatinine are elevated.  GFR is decreased.  Albumin low 9.  Troponin I elevated.  Moderate amount of bacteria on UA.  L Tib Fib MRI pending.  On exam, patient very somnolent and unable to give tangible answers.  Brother present in room, and relays most of history.  Brother states for the past couple of weeks has left lower extremity TMA site wound has been exuding purulence.  Patient is nonambulatory.  Uses a wheelchair to get around.    Scheduled Meds:   amoxicillin-clavulanate 875-125mg  1 tablet Oral Q12H    atorvastatin  40 mg Oral Daily    enoxparin  1 mg/kg Subcutaneous Q12H    gabapentin   300 mg Oral BID    hydrALAZINE  100 mg Oral Q8H    insulin aspart U-100  7 Units Subcutaneous TIDWM    insulin detemir U-100  30 Units Subcutaneous QHS    pantoprazole  40 mg Oral Daily    sodium chloride 0.9%  10 mL Intravenous Q6H    tamsulosin  0.4 mg Oral Daily     Continuous Infusions:  PRN Meds:acetaminophen, aluminum-magnesium hydroxide-simethicone, dextrose 10%, dextrose 10%, dextrose 10%, melatonin, ondansetron, sodium chloride 0.9%, Flushing PICC/Midline Protocol **AND** sodium chloride 0.9% **AND** sodium chloride 0.9%, sodium chloride 0.9%, triamcinolone acetonide 0.025%    Review of patient's allergies indicates:  No Known Allergies     Past Medical History:   Diagnosis Date    Arthritis     legs    Diabetes mellitus     Diabetes mellitus, type 2     Hyperlipidemia     Hypertension     Osteomyelitis     Palliative care encounter 5/24/2023     Past Surgical History:   Procedure Laterality Date    ANGIOGRAPHY OF LOWER EXTREMITY N/A 2/3/2021    Procedure: Angiogram Extremity Bilateral;  Surgeon: Ernst Chacko MD;  Location: [facility] OR [address] [place] [address];  Service: Peripheral Vascular;  Laterality: N/A;  7.4 mintues fluoroscopy time  816.15 mGy  170.17 Gycm2    AORTOGRAPHY WITH EXTREMITY RUNOFF Bilateral 2/3/2021    Procedure: AORTOGRAM, WITH EXTREMITY RUNOFF;  Surgeon: Ernst Chacko MD;  Location: [facility] OR [address] [place] [address];  Service: Peripheral Vascular;  Laterality: Bilateral;    DEBRIDEMENT OF FOOT Left 2/23/2021    Procedure: DEBRIDEMENT, LEFT HEEL;  Surgeon: Mayra Schroeder DPM;  Location: [address];  Service: Podiatry;  Laterality: Left;    FOOT AMPUTATION  October 2010    left high midfoot amputation    IMPLANTATION OF LEADLESS PACEMAKER N/A 10/12/2023    Procedure: [id number];  Surgeon: VANESSA Delatorre MD;  Location: [facility] EP LAB;  Service: Cardiology;  Laterality: N/A;  AVB, MICRA, EH, ANES, RM [id number]       Family History       Problem Relation (Age of Onset)    Cancer  Brother    Diabetes Mother, Sister    Heart disease Mother    Stroke Sister          Tobacco Use    Smoking status: Never    Smokeless tobacco: Never   Substance and Sexual Activity    Alcohol use: No     Comment: occassional    Drug use: No    Sexual activity: Not Currently     Review of Systems   Constitutional:  Positive for chills and fatigue. Negative for diaphoresis and fever.   HENT:  Negative for congestion, ear pain, nosebleeds, rhinorrhea and sore throat.    Eyes:  Negative for pain.   Respiratory:  Negative for cough, shortness of breath and wheezing.    Cardiovascular:  Positive for leg swelling. Negative for chest pain.   Gastrointestinal:  Negative for abdominal pain, constipation, diarrhea, nausea and vomiting.   Genitourinary:  Negative for dysuria, frequency and urgency.   Musculoskeletal:  Positive for arthralgias, gait problem and myalgias. Negative for back pain and neck pain.   Skin:  Positive for color change and wound.   Neurological:  Negative for weakness, numbness and headaches.     Objective:     Vital Signs (Most Recent):  Temp: 99.2 °F (37.3 °C) (03/06/24 1535)  Pulse: 97 (03/06/24 1631)  Resp: 19 (03/06/24 1535)  BP: (!) 156/67 (03/06/24 1535)  SpO2: 98 % (03/06/24 1622) Vital Signs (24h Range):  Temp:  [98.2 °F (36.8 °C)-99.2 °F (37.3 °C)] 99.2 °F (37.3 °C)  Pulse:  [] 97  Resp:  [15-27] 19  SpO2:  [95 %-100 %] 98 %  BP: (115-157)/(59-92) 156/67     Weight: 120.2 kg (265 lb)  Body mass index is 39.13 kg/m².    Foot Exam    General  Orientation: unable to assess       Right Foot/Ankle     Inspection and Palpation  Skin Exam: dry skin, fissure, skin changes, abnormal color and ulcer;     Neurovascular  Dorsalis pedis: absent  Posterior tibial: absent  Saphenous nerve sensation: diminished  Tibial nerve sensation: diminished  Superficial peroneal nerve sensation: diminished  Deep peroneal nerve sensation: diminished  Sural nerve sensation: diminished    Comments  Right anterior  ankle wound:  Wound is superficial, with well adhered epithelialized tissue.  No surrounding erythema or edema noted.    Diffuse xerotic, abrasions, wounds, hemosiderin deposits and dry flaky skin noted.    Left Foot/Ankle      Inspection and Palpation  Skin Exam: dry skin, skin changes, abnormal color, ulcer and erythema;     Neurovascular  Dorsalis pedis: absent  Posterior tibial: absent  Saphenous nerve sensation: diminished  Tibial nerve sensation: diminished  Superficial peroneal nerve sensation: diminished  Deep peroneal nerve sensation: diminished  Sural nerve sensation: diminished    Comments  Status post Chopart amputation:  Now with full-thickness wound on the posterior aspect.  Wound is malodorous, probes deep to bone.  Granular base with macerated borders.  Undermining noted throughout plantar aspect.    Left medial leg wound:  Irregular borders and widespread with weeping purulence noted.  Malodorous.  Surrounding erythema noted.  Edema noted.  Diffuse eschar and fibrous tissue noted.    Diffuse xerotic, abrasions, wounds, hemosiderin deposits                                          Laboratory:  A1C:   Recent Labs   Lab 09/13/23  0616 03/05/24  0900   HGBA1C 11.0* >14.0*     BMP:   Recent Labs   Lab 03/06/24  0846 03/06/24  0847 03/06/24  1306   GLU  --    < > 189*   NA  --    < > 142   K  --    < > 4.3   CL  --    < > 105   CO2  --    < > 26   BUN  --    < > 21   CREATININE  --    < > 1.6*   CALCIUM  --    < > 9.0   MG 2.1  --   --     < > = values in this interval not displayed.     CBC:   Recent Labs   Lab 03/06/24  0847   WBC 10.65   RBC 3.88*   HGB 9.7*   HCT 32.4*      MCV 84   MCH 25.0*   MCHC 29.9*     CMP:   Recent Labs   Lab 03/06/24  0847 03/06/24  1306   *  173* 189*   CALCIUM 9.0  9.0 9.0   ALBUMIN 1.9*  --    PROT 7.2  --      141 142   K 3.9  3.9 4.3   CO2 24  24 26     105 105   BUN 25*  25* 21   CREATININE 1.6*  1.6* 1.6*   ALKPHOS 69  --    ALT 21  --  "   AST 65*  --    BILITOT 0.6  --      CRP:   Recent Labs   Lab 03/05/24  0900   .7*     ESR: No results for input(s): "SEDRATE" in the last 168 hours.  Wound Cultures:   Recent Labs   Lab 10/03/23  1216   LABAERO FERNANDO ALBICANS  Few  *  ACHROMOBACTER XYLOSOXIDANS SUBSP. DENTRIFICANS  Rare  Skin bossman also present  *     Microbiology Results (last 7 days)       Procedure Component Value Units Date/Time    Culture, Anaerobe [[id number]]     Order Status: No result Specimen: Wound from Leg, Left     Aerobic culture [[id number]]     Order Status: No result Specimen: Wound from Leg, Left     Gram stain [[id number]]     Order Status: No result Specimen: Wound from Leg, Left     Fungus culture [[id number]]     Order Status: No result Specimen: Wound from Leg, Left     AFB Culture & Smear [[id number]]     Order Status: No result Specimen: Wound from Leg, Left     Blood culture x two cultures. Draw prior to antibiotics. [[id number]] Collected: 03/05/24 0900    Order Status: Completed Specimen: Blood from Peripheral, Hand, Left Updated: 03/06/24 1012     Blood Culture, Routine No Growth to date      No Growth to date    Narrative:      Aerobic and anaerobic    Blood culture x two cultures. Draw prior to antibiotics. [[id number]] Collected: 03/05/24 0900    Order Status: Completed Specimen: Blood from Peripheral, Forearm, Right Updated: 03/06/24 1012     Blood Culture, Routine No Growth to date      No Growth to date    Narrative:      Aerobic and anaerobic    Aerobic culture [[id number]]     Order Status: Canceled Specimen: Wound           Specimen (24h ago, onward)      None            Diagnostic Results:  I have reviewed all pertinent imaging results/findings within the past 24 hours.  None  Assessment/Plan:     ID  Osteomyelitis  Saji Castañeda is a 74 y.o. male who is status post left lower extremity toe part amputation.  Now presenting for acute infection, with acute on chronic osteomyelitis of the left " calcaneus.  Wound is malodorous and exuding purulence.  Left proximal leg with weeping purulence noted as well.  Arterial ultrasound shows no hemodynamically significant stenosis.  Left MRI shows osteomyelitis.  At this time there is no more proximal amputation that Podiatry can offer.     - recommend vascular surgery consult for possible BKA versus AKA.  Appreciate recs.  Vascular surgery consulted.  - antibiotics per ID/primary.  Cultures taken of left posterior calcaneus.  - wounds cleansed and dressed, wound care orders to follow.  - nonweightbearing to left lower extremity.  - pain management per primary.        Thank you for your consult. I will follow-up with patient. Please contact us if you have any additional questions.    Brooks Trujillo DPM  Podiatry  [facility] - Telemetry Stepdown

## 2024-03-06 NOTE — ASSESSMENT & PLAN NOTE
Patient's FSGs are uncontrolled due to hyperglycemia on current medication regimen.  Last A1c reviewed-   Lab Results   Component Value Date    HGBA1C >14.0 (H) 03/05/2024     Most recent fingerstick glucose reviewed-   Recent Labs   Lab 03/06/24  0922 03/06/24  1058 03/06/24  1110 03/06/24  1217   POCTGLUCOSE 182* 207* 193* 217*       Current correctional scale   Insulin drip  Maintain anti-hyperglycemic dose as follows-   Antihyperglycemics (From admission, onward)    Start     Stop Route Frequency Ordered    03/06/24 0715  insulin aspart U-100 pen 7 Units         -- SubQ 3 times daily with meals 03/05/24 1714    03/06/24 0045  insulin regular in 0.9 % NaCl 100 unit/100 mL (1 unit/mL) infusion        Question Answer Comment   Initial dose (DO NOT CHANGE): 0.1 units/kg/hr    Insulin Rate Adjustment (DO NOT MODIFY ANSWER) \\[email or web address] DKA version [email or web address]        -- IV Continuous 03/06/24 0041    03/05/24 1800  insulin detemir U-100 (Levemir) pen 30 Units         -- SubQ Nightly 03/05/24 1713        Hold Oral hypoglycemics while patient is in the hospital.  Will keep patient on DKA pathway with insulin drip and fluid with protocol in place for switching to subcutaneous insulin as anion gap closes.      DKA  HHS  - DKA/HHS Pathway initiated  - Blood sugar on arrival 677 with a bicarb 29.7, anion gap 14, BHB 1.4 and pH 7.3  - HbA1c 14  - Likely induced by lack of medication and/or infection  - Home DM regimen:  Insulin and Glipizide  - Start insulin gtt per protocol with q1h accuchecks while on the drip.    - Once Bicarb >18, Anion gap <10, Glucose <200 on 2 readings and able to tolerate PO intake without nausea and vomiting, transition to subq insulin with a 1-2 h overlap with drip.    Long acting insulin dose:  Detemir (0.25mg/kg) daily or in 2 doses  Short acting insulin dose:  Aspart (0.08mg/kg) units per meal  - Start normal saline at 125cc/hr.  Once  blood sugar is 200, change IVF to D5 1/2 NS at 50cc/h  - Monitor electrolytes with BMP q4h while on insulin gtt and place on tele  - Bariatric clear liquid diet while on insulin gtt then change to Diabetic diet when initiating subq insulin with accuchecks 4x daily before meals and at bedtime (AC and HS)  - Endocrine consult

## 2024-03-06 NOTE — PT/OT/SLP EVAL
[facility]  ENT CONSULT NOTE    REQUESTING PROVIDER:  Ella August DO    SUBJECTIVE:  Sanjuana Rendon is a 63 year old female seen in consultation today at the request of Ella August DO for tinnitus.Started after a upper respiratory infection in October. Developed plugging and pressure in both ears and then subsequently noticed some tinnitus. Describes as bilateral and soft high pitch sound.Subjectively feels hearing was slightly down.Denies vertigo but occasional lightheadedness and nausea. Denies starting any new medications.Denies pain,discharge.Deneie bruxism. Has some neck tension and arthritis in the neck.Tinnitus gradually improving. History of noise exposure but has worn hearing protection for the last 20 years.She drinks 1-2 cups of coffee a day.    HISTORIES:  I have reviewed the past medical history, surgical history, family history, social history, medications and allergies listed in the medical record as obtained by my nursing staff and support staff and agree with their documentation.      REVIEW OF SYSTEMS  As per HPI, all other systems have been reviewed and are negative.      PHYSICAL EXAM  Vitals:    12/18/19 0858   BP: 126/78   Pulse: 72   Resp: 16     GENERAL: On exam, patient is alert, oriented x3, well-developed, well-nourished, in no apparent distress.   Patient is attentive and responds to questions appropriately.  Voice is not hoarse.  Overall appearance of the head and neck is normal.  Facial symmetry and movement are within normal limits bilaterally, and skin is warm and dry.  Ears:  Normal pinnae, cerumen impaction right ear canals, and normal tympanic membranes bilaterally.  Normal motion of the TM (tympanic membrane) on pneumatic otoscopy.  No TMJ (temporomandibular joint) tenderness.  No mastoid process tenderness.  Speech reception within normal limits.  Nose:  Normal septum, bridge, and nasal mucosa.  No sinus tenderness on percussion.  Oral cavity:  Normal lips, gums,  Physical Therapy Co-Evaluation    Patient Name:  Saji Castañeda   MRN:  [id number]    Recommendations:     Discharge Recommendations: Moderate Intensity Therapy   Discharge Equipment Recommendations: slide board, wound care supplies, walker, rolling, bedside commode   Barriers to discharge: Decreased caregiver support and Increased skilled assistance    Assessment:   Co-evaluation/treatment performed due to patient's multiple deficits requiring two skilled therapists to appropriately and safely assess patient's strength, endurance, functional mobility, and ADL performance while facilitating functional tasks in addition to accommodating for patient's activity tolerance and medical acuity.    Saji Castañeda is a 74 y.o. male admitted with a medical diagnosis of DKA (diabetic ketoacidosis).  He presents with the following impairments/functional limitations: impaired skin, weakness, impaired endurance, impaired sensation, impaired functional mobility, impaired balance, decreased lower extremity function, decreased upper extremity function, pain, impaired coordination, edema, impaired cardiopulmonary response to activity. Patient demonstrating good motivation to participate in evaluation, with fairly good response to completed activities and provided education. Overall, evaluation limited by significant BLE & gluteal lesions resulting in increased pain. Furthermore, RN required to perform skin assessment during evaluation. Additionally, patient's BP/HR trending high throughout session, see below. For these reasons, formal BLE strength/AROM and transfer assessment not performed this date. Patient demonstrating good quality static sitting EOB, however requiring progressive assistance while dual tasking with OT's UE assessment. At this time, patient demonstrates that they will greatly benefit from moderate intensity skilled physical therapy services post-acutely    Rehab Prognosis: Good; patient would benefit from acute  "skilled PT services to address these deficits and reach maximum level of function.    Recent Surgery: * No surgery found *      Plan:     During this hospitalization, patient to be seen 4 x/week to address the identified rehab impairments via therapeutic activities, therapeutic exercises, therapeutic groups, wheelchair management/training and progress toward the following goals:    Plan of Care Expires:  04/06/24    Subjective     Chief Complaint: Pain  Patient/Family Comments/goals: To get better  Pain/Comfort:  Pain Rating 1: 10/10  Location - Side 1: Bilateral  Location - Orientation 1: lower  Location 1: leg  Pain Addressed 1: Reposition, Distraction  Pain Rating Post-Intervention 1: Not reported    Patients cultural, spiritual, Taoism conflicts given the current situation: no    Social History:  Residence: Patient lives with their sister and brother in a single story house with number of outside stair(s): TTE . Pt's bathroom has a Tub/Jacuzzi, however takes sponge baths.  Equipment Used: wheelchair, grab bar  Prior level of function:  Prior to admission, patient was modified independent for WC locomotion and Independent mobility and ADLs  Assistance Upon Discharge:  Siblings    Objective:     OT facilitated communication with RN prior to session.  Patient found HOB elevated with  (Midline Catheter)  upon PT entry to room.    General Precautions: Standard, fall, contact, diabetic   Orthopedic Precautions:N/A   Braces: N/A   Body mass index is 39.13 kg/m².  Oxygen Device: Room Air  Vitals: BP (!) 149/70 (Patient Position: Lying)   Pulse 99   Temp 98.3 °F (36.8 °C) (Oral)   Resp 15   Ht 5' 9" (1.753 m)   Wt 120.2 kg (265 lb)   SpO2 100%   BMI 39.13 kg/m²     Exams:  Cognition:   Alert and Cooperative   Patient is oriented to Person, Place, Time, Situation  Command following: Follows one-step verbal commands Possibly limited 2/2 pain  Fluency: clear/fluent  Skin Integrity: Several Open Wounds along BLE & " floor of mouth, buccal mucosa, tongue, and hard palate.  Oropharynx:  Normal soft palate, posterior pharyngeal wall, tonsillar fossae and tonsils.  Neck:  No abnormal masses, thyroid masses or enlargement, lymphadenopathy, crepitus or tenderness.  Submandibular and parotid glands normal to palpation.  Trachea midline.  Chest:  No respiratory distress, stridor, or wheezing.  Cardiovascular:  No peripheral edema, normal peripheral perfusion, no pallor.  Neurologic:  Cranial nerve function grossly within normal limits.    \    AUDIOGRAM:  I have reviewed the audiology report and personally visualized the audiogram from today which is normal.    PROCEDURE:   Removal of Impacted Cerumen    I removed impacted cerumen from the patient's Right  ear(s) using microscope, curette, alligator forceps, and suction as needed with mild difficulty Patient tolerated the procedure well.  No injury occurred to the canal or tympanic membrane.    This took approximately 5 minutes.    ASSESSMENT:  1. Impacted cerumen of right ear    2. Objective tinnitus, unspecified laterality            PLAN:  I have prescribed Flonase nasal spray.  I have asked her to return if symptoms worsen or fail to subside over the next few months.  We discussed tinnitus and its causes.        Counseled patient regarding exam findings. Patient indicated understanding of the diagnosis and agreed with the plan of care.    Nirali Marques MD    12/18/19       lower glutes  Postural Assessment: slouched posture, rounded shoulders, and forward head  Physical Exam:    Left LE Right LE   Edema present present     LLE ROM: Limited Assessment, performed functionally. **Patient w/ Chopart amp  RLE ROM: Limited Assessment, performed functionally    BLE Strength: Limited functional assessment, see below    Functional Mobility:  Bed Mobility: Increased time required for all tasks 2/2 pain related to lesions. Assistance required at trunk & BLEs. Cuing required for breakdown of complex motor task into single steps  Rolling Left: MaxAx2 with HOB Flat  Rolling Right: MaxAx2 with HOB Flat  Boosting: TotalAx2 with HOB Flat  Supine>Sit: x1, MaxAx2 with HOB Elevated  Sit>R Sidelying: x1, MaxAx2 with HOB Flat    Transfers: Not assessed 2/2 VS, Pain, and RN present to assess wounds    Balance:   Static Sitting:  CGA-SBA  Dynamic Sitting: CGA regressing to ModA  Total Time Sitting: ~10 mins EOB    AM-PAC 6 CLICK MOBILITY  Total Score:8     Treatment & Education:  Increased time required secondary to patient's pain tolerance  Increased time required secondary to RN perform skin/wound assessment of BLEs & Glutes  Increased time required due to episode of bowel incontinence & associated pericare  Increased time required to for consistent monitoring of VS throughout completed activities, see below  Pre-activity Resting (HOB elevated): 168/70mmHg (MAP: 100) ; HR: 108bpm  Sitting EOB: 162/69mmHg (MAP: 99); HR: 108-111bpm  Post-activity Resting (HOB elevated): 139/66mmHg (MAP: 95) ; HR: 105bpm    Patient Education Provided on:  The role of physical therapy and how the patient can benefit from skilled services  The negative effects of prolonged bed rest/sedentary behavior and patient participation with PT  The importance of contacting RN, via call light, for mobility throughout the day  Pt white board updated with current therapists name and level of mobility assistance needed.     Patient  Verbalized and Demonstrated understanding of all topics touched on this date. All patient questions answered within the PT scope of practice    Patient left HOB elevated with all lines intact, call button in reach, bed alarm on, and RN notified.    GOALS:   Multidisciplinary Problems       Physical Therapy Goals          Problem: Physical Therapy    Goal Priority Disciplines Outcome Goal Variances Interventions   Physical Therapy Goal     PT, PT/OT Ongoing, Progressing     Description: Goals to be met by: 24     Patient will increase functional independence with mobility by performin. Supine to sit with Moderate Assistance  2. Sit to supine with Moderate Assistance  3. Rolling to Left and Right with Moderate Assistance.  4. Sit to stand transfer with Maximum Assistance with LRAD  5. Bed to chair transfer with Moderate Assistance using LRAD  6. Wheelchair propulsion x100 feet with Stand-by Assistance using UE/LE  7. Sitting at edge of bed 10 minutes with Modified [place]  8. Lower extremity exercise program x30 reps per handout, with assistance as needed                         History:     Past Medical History:   Diagnosis Date    Arthritis     legs    Diabetes mellitus     Diabetes mellitus, type 2     Hyperlipidemia     Hypertension     Osteomyelitis     Palliative care encounter 2023       Past Surgical History:   Procedure Laterality Date    ANGIOGRAPHY OF LOWER EXTREMITY N/A 2/3/2021    Procedure: Angiogram Extremity Bilateral;  Surgeon: Ernst Chacko MD;  Location: [address];  Service: Peripheral Vascular;  Laterality: N/A;  7.4 mintues fluoroscopy time  816.15 mGy  170.17 Gycm2    AORTOGRAPHY WITH EXTREMITY RUNOFF Bilateral 2/3/2021    Procedure: AORTOGRAM, WITH EXTREMITY RUNOFF;  Surgeon: Ernst Chacko MD;  Location: [facility] OR [address] [place] [address];  Service: Peripheral Vascular;  Laterality: Bilateral;    DEBRIDEMENT OF FOOT Left 2021    Procedure: DEBRIDEMENT, LEFT HEEL;   Surgeon: Mayra Schroeder DPM;  Location: [facility] OR [address] [place] [address];  Service: Podiatry;  Laterality: Left;    FOOT AMPUTATION  October 2010    left high midfoot amputation    IMPLANTATION OF LEADLESS PACEMAKER N/A 10/12/2023    Procedure: [id number];  Surgeon: VANESSA Delatorre MD;  Location: [facility] EP LAB;  Service: Cardiology;  Laterality: N/A;  AVB, MICRA, EH, [place], [place] [id number]       Time Tracking:     PT Received On: 03/06/24  PT Start Time: 0931     PT Stop Time: 1012  PT Total Time (min): 41 min     Billable Minutes: Evaluation 8, Therapeutic Activity 22, and Neuromuscular Re-education 11    03/06/2024

## 2024-03-07 ENCOUNTER — TELEPHONE (OUTPATIENT)
Dept: INFECTIOUS DISEASES | Facility: HOSPITAL | Age: 74
End: 2024-03-07
Payer: MEDICARE

## 2024-03-07 PROBLEM — R78.81 BACTEREMIA DUE TO GRAM-NEGATIVE BACTERIA: Status: RESOLVED | Noted: 2021-03-23 | Resolved: 2024-03-07

## 2024-03-07 PROBLEM — N18.32 STAGE 3B CHRONIC KIDNEY DISEASE: Status: ACTIVE | Noted: 2024-03-05

## 2024-03-07 PROBLEM — Z79.01 CHRONIC ANTICOAGULATION: Status: ACTIVE | Noted: 2024-03-07

## 2024-03-07 PROBLEM — E78.49 OTHER HYPERLIPIDEMIA: Status: ACTIVE | Noted: 2024-03-05

## 2024-03-07 PROBLEM — Z79.4 LONG TERM CURRENT USE OF INSULIN: Status: ACTIVE | Noted: 2024-03-07

## 2024-03-07 PROBLEM — I73.9 PERIPHERAL ARTERIAL DISEASE: Status: ACTIVE | Noted: 2020-12-10

## 2024-03-07 PROBLEM — Z89.432 HISTORY OF AMPUTATION OF LEFT FOOT: Status: ACTIVE | Noted: 2024-03-07

## 2024-03-07 PROBLEM — R79.89 ELEVATED TROPONIN: Status: RESOLVED | Noted: 2024-03-05 | Resolved: 2024-03-07

## 2024-03-07 LAB
ALBUMIN SERPL BCP-MCNC: 1.7 G/DL (ref 3.5–5.2)
ALP SERPL-CCNC: 62 U/L (ref 55–135)
ALT SERPL W/O P-5'-P-CCNC: 23 U/L (ref 10–44)
ANION GAP SERPL CALC-SCNC: 8 MMOL/L (ref 8–16)
ANION GAP SERPL CALC-SCNC: 9 MMOL/L (ref 8–16)
ANION GAP SERPL CALC-SCNC: 9 MMOL/L (ref 8–16)
AST SERPL-CCNC: 73 U/L (ref 10–40)
BASOPHILS # BLD AUTO: 0.03 K/UL (ref 0–0.2)
BASOPHILS # BLD AUTO: 0.03 K/UL (ref 0–0.2)
BASOPHILS NFR BLD: 0.4 % (ref 0–1.9)
BASOPHILS NFR BLD: 0.4 % (ref 0–1.9)
BILIRUB SERPL-MCNC: 0.4 MG/DL (ref 0.1–1)
BUN SERPL-MCNC: 18 MG/DL (ref 8–23)
BUN SERPL-MCNC: 19 MG/DL (ref 8–23)
BUN SERPL-MCNC: 19 MG/DL (ref 8–23)
BUN SERPL-MCNC: 20 MG/DL (ref 8–23)
BUN SERPL-MCNC: 21 MG/DL (ref 8–23)
BUN SERPL-MCNC: 21 MG/DL (ref 8–23)
CALCIUM SERPL-MCNC: 8.4 MG/DL (ref 8.7–10.5)
CALCIUM SERPL-MCNC: 8.5 MG/DL (ref 8.7–10.5)
CALCIUM SERPL-MCNC: 8.7 MG/DL (ref 8.7–10.5)
CALCIUM SERPL-MCNC: 8.8 MG/DL (ref 8.7–10.5)
CHLORIDE SERPL-SCNC: 108 MMOL/L (ref 95–110)
CHLORIDE SERPL-SCNC: 109 MMOL/L (ref 95–110)
CHLORIDE SERPL-SCNC: 110 MMOL/L (ref 95–110)
CO2 SERPL-SCNC: 21 MMOL/L (ref 23–29)
CO2 SERPL-SCNC: 23 MMOL/L (ref 23–29)
CO2 SERPL-SCNC: 23 MMOL/L (ref 23–29)
CO2 SERPL-SCNC: 24 MMOL/L (ref 23–29)
CO2 SERPL-SCNC: 25 MMOL/L (ref 23–29)
CO2 SERPL-SCNC: 25 MMOL/L (ref 23–29)
CREAT SERPL-MCNC: 1.3 MG/DL (ref 0.5–1.4)
CREAT SERPL-MCNC: 1.4 MG/DL (ref 0.5–1.4)
CREAT SERPL-MCNC: 1.6 MG/DL (ref 0.5–1.4)
CREAT SERPL-MCNC: 1.7 MG/DL (ref 0.5–1.4)
DIFFERENTIAL METHOD BLD: ABNORMAL
DIFFERENTIAL METHOD BLD: ABNORMAL
EOSINOPHIL # BLD AUTO: 0.2 K/UL (ref 0–0.5)
EOSINOPHIL # BLD AUTO: 0.2 K/UL (ref 0–0.5)
EOSINOPHIL NFR BLD: 2.5 % (ref 0–8)
EOSINOPHIL NFR BLD: 2.5 % (ref 0–8)
ERYTHROCYTE [DISTWIDTH] IN BLOOD BY AUTOMATED COUNT: 17 % (ref 11.5–14.5)
ERYTHROCYTE [DISTWIDTH] IN BLOOD BY AUTOMATED COUNT: 17 % (ref 11.5–14.5)
EST. GFR  (NO RACE VARIABLE): 41.5 ML/MIN/1.73 M^2
EST. GFR  (NO RACE VARIABLE): 44.7 ML/MIN/1.73 M^2
EST. GFR  (NO RACE VARIABLE): 52.4 ML/MIN/1.73 M^2
EST. GFR  (NO RACE VARIABLE): 57.3 ML/MIN/1.73 M^2
GLUCOSE SERPL-MCNC: 117 MG/DL (ref 70–110)
GLUCOSE SERPL-MCNC: 121 MG/DL (ref 70–110)
GLUCOSE SERPL-MCNC: 158 MG/DL (ref 70–110)
GLUCOSE SERPL-MCNC: 165 MG/DL (ref 70–110)
GLUCOSE SERPL-MCNC: 165 MG/DL (ref 70–110)
GLUCOSE SERPL-MCNC: 199 MG/DL (ref 70–110)
HCT VFR BLD AUTO: 29.3 % (ref 40–54)
HCT VFR BLD AUTO: 29.3 % (ref 40–54)
HGB BLD-MCNC: 8.8 G/DL (ref 14–18)
HGB BLD-MCNC: 8.8 G/DL (ref 14–18)
IMM GRANULOCYTES # BLD AUTO: 0.06 K/UL (ref 0–0.04)
IMM GRANULOCYTES # BLD AUTO: 0.06 K/UL (ref 0–0.04)
IMM GRANULOCYTES NFR BLD AUTO: 0.7 % (ref 0–0.5)
IMM GRANULOCYTES NFR BLD AUTO: 0.7 % (ref 0–0.5)
LYMPHOCYTES # BLD AUTO: 0.9 K/UL (ref 1–4.8)
LYMPHOCYTES # BLD AUTO: 0.9 K/UL (ref 1–4.8)
LYMPHOCYTES NFR BLD: 11.2 % (ref 18–48)
LYMPHOCYTES NFR BLD: 11.2 % (ref 18–48)
MAGNESIUM SERPL-MCNC: 2 MG/DL (ref 1.6–2.6)
MCH RBC QN AUTO: 25.7 PG (ref 27–31)
MCH RBC QN AUTO: 25.7 PG (ref 27–31)
MCHC RBC AUTO-ENTMCNC: 30 G/DL (ref 32–36)
MCHC RBC AUTO-ENTMCNC: 30 G/DL (ref 32–36)
MCV RBC AUTO: 85 FL (ref 82–98)
MCV RBC AUTO: 85 FL (ref 82–98)
MONOCYTES # BLD AUTO: 0.6 K/UL (ref 0.3–1)
MONOCYTES # BLD AUTO: 0.6 K/UL (ref 0.3–1)
MONOCYTES NFR BLD: 7.2 % (ref 4–15)
MONOCYTES NFR BLD: 7.2 % (ref 4–15)
NEUTROPHILS # BLD AUTO: 6.5 K/UL (ref 1.8–7.7)
NEUTROPHILS # BLD AUTO: 6.5 K/UL (ref 1.8–7.7)
NEUTROPHILS NFR BLD: 78 % (ref 38–73)
NEUTROPHILS NFR BLD: 78 % (ref 38–73)
NRBC BLD-RTO: 0 /100 WBC
NRBC BLD-RTO: 0 /100 WBC
PHOSPHATE SERPL-MCNC: 2 MG/DL (ref 2.7–4.5)
PLATELET # BLD AUTO: 361 K/UL (ref 150–450)
PLATELET # BLD AUTO: 361 K/UL (ref 150–450)
PMV BLD AUTO: 10.1 FL (ref 9.2–12.9)
PMV BLD AUTO: 10.1 FL (ref 9.2–12.9)
POCT GLUCOSE: 170 MG/DL (ref 70–110)
POCT GLUCOSE: 178 MG/DL (ref 70–110)
POCT GLUCOSE: 241 MG/DL (ref 70–110)
POCT GLUCOSE: 261 MG/DL (ref 70–110)
POTASSIUM SERPL-SCNC: 3.7 MMOL/L (ref 3.5–5.1)
POTASSIUM SERPL-SCNC: 3.7 MMOL/L (ref 3.5–5.1)
POTASSIUM SERPL-SCNC: 4 MMOL/L (ref 3.5–5.1)
POTASSIUM SERPL-SCNC: 4.2 MMOL/L (ref 3.5–5.1)
POTASSIUM SERPL-SCNC: 4.2 MMOL/L (ref 3.5–5.1)
POTASSIUM SERPL-SCNC: 5.4 MMOL/L (ref 3.5–5.1)
PROT SERPL-MCNC: 6.6 G/DL (ref 6–8.4)
RBC # BLD AUTO: 3.43 M/UL (ref 4.6–6.2)
RBC # BLD AUTO: 3.43 M/UL (ref 4.6–6.2)
SODIUM SERPL-SCNC: 139 MMOL/L (ref 136–145)
SODIUM SERPL-SCNC: 141 MMOL/L (ref 136–145)
SODIUM SERPL-SCNC: 141 MMOL/L (ref 136–145)
SODIUM SERPL-SCNC: 143 MMOL/L (ref 136–145)
WBC # BLD AUTO: 8.37 K/UL (ref 3.9–12.7)
WBC # BLD AUTO: 8.37 K/UL (ref 3.9–12.7)

## 2024-03-07 PROCEDURE — 99233 SBSQ HOSP IP/OBS HIGH 50: CPT | Mod: ,,, | Performed by: PHYSICIAN ASSISTANT

## 2024-03-07 PROCEDURE — 84100 ASSAY OF PHOSPHORUS: CPT

## 2024-03-07 PROCEDURE — 97530 THERAPEUTIC ACTIVITIES: CPT

## 2024-03-07 PROCEDURE — 97535 SELF CARE MNGMENT TRAINING: CPT

## 2024-03-07 PROCEDURE — 83735 ASSAY OF MAGNESIUM: CPT

## 2024-03-07 PROCEDURE — 80053 COMPREHEN METABOLIC PANEL: CPT

## 2024-03-07 PROCEDURE — 36415 COLL VENOUS BLD VENIPUNCTURE: CPT | Mod: XB

## 2024-03-07 PROCEDURE — 25000003 PHARM REV CODE 250: Performed by: INTERNAL MEDICINE

## 2024-03-07 PROCEDURE — 99233 SBSQ HOSP IP/OBS HIGH 50: CPT | Mod: GC,,, | Performed by: INTERNAL MEDICINE

## 2024-03-07 PROCEDURE — 25000003 PHARM REV CODE 250

## 2024-03-07 PROCEDURE — 25000003 PHARM REV CODE 250: Performed by: PHYSICIAN ASSISTANT

## 2024-03-07 PROCEDURE — 63600175 PHARM REV CODE 636 W HCPCS

## 2024-03-07 PROCEDURE — 27000207 HC ISOLATION

## 2024-03-07 PROCEDURE — A4216 STERILE WATER/SALINE, 10 ML: HCPCS

## 2024-03-07 PROCEDURE — 20600001 HC STEP DOWN PRIVATE ROOM

## 2024-03-07 PROCEDURE — 80048 BASIC METABOLIC PNL TOTAL CA: CPT | Mod: 91,XB

## 2024-03-07 PROCEDURE — 85025 COMPLETE CBC W/AUTO DIFF WBC: CPT

## 2024-03-07 RX ORDER — INSULIN ASPART 100 [IU]/ML
10 INJECTION, SOLUTION INTRAVENOUS; SUBCUTANEOUS
Status: DISCONTINUED | OUTPATIENT
Start: 2024-03-07 | End: 2024-03-15

## 2024-03-07 RX ORDER — CLOPIDOGREL BISULFATE 75 MG/1
75 TABLET ORAL DAILY
Status: DISCONTINUED | OUTPATIENT
Start: 2024-03-08 | End: 2024-03-25

## 2024-03-07 RX ORDER — HYDRALAZINE HYDROCHLORIDE 10 MG/1
10 TABLET, FILM COATED ORAL DAILY PRN
Status: DISCONTINUED | OUTPATIENT
Start: 2024-03-07 | End: 2024-03-19

## 2024-03-07 RX ORDER — NIFEDIPINE 30 MG/1
90 TABLET, EXTENDED RELEASE ORAL DAILY
Status: DISCONTINUED | OUTPATIENT
Start: 2024-03-07 | End: 2024-04-05 | Stop reason: HOSPADM

## 2024-03-07 RX ADMIN — Medication 10 ML: at 04:03

## 2024-03-07 RX ADMIN — ACETAMINOPHEN 650 MG: 325 TABLET ORAL at 09:03

## 2024-03-07 RX ADMIN — AMOXICILLIN AND CLAVULANATE POTASSIUM 1 TABLET: 875; 125 TABLET, FILM COATED ORAL at 09:03

## 2024-03-07 RX ADMIN — AMOXICILLIN AND CLAVULANATE POTASSIUM 1 TABLET: 875; 125 TABLET, FILM COATED ORAL at 08:03

## 2024-03-07 RX ADMIN — INSULIN ASPART 7 UNITS: 100 INJECTION, SOLUTION INTRAVENOUS; SUBCUTANEOUS at 08:03

## 2024-03-07 RX ADMIN — GABAPENTIN 300 MG: 300 CAPSULE ORAL at 09:03

## 2024-03-07 RX ADMIN — ENOXAPARIN SODIUM 120 MG: 120 INJECTION SUBCUTANEOUS at 08:03

## 2024-03-07 RX ADMIN — NIFEDIPINE 90 MG: 30 TABLET, FILM COATED, EXTENDED RELEASE ORAL at 10:03

## 2024-03-07 RX ADMIN — PANTOPRAZOLE SODIUM 40 MG: 40 TABLET, DELAYED RELEASE ORAL at 08:03

## 2024-03-07 RX ADMIN — Medication 10 ML: at 12:03

## 2024-03-07 RX ADMIN — DIBASIC SODIUM PHOSPHATE, MONOBASIC POTASSIUM PHOSPHATE AND MONOBASIC SODIUM PHOSPHATE 2 TABLET: 852; 155; 130 TABLET ORAL at 02:03

## 2024-03-07 RX ADMIN — Medication 10 ML: at 06:03

## 2024-03-07 RX ADMIN — INSULIN DETEMIR 30 UNITS: 100 INJECTION, SOLUTION SUBCUTANEOUS at 09:03

## 2024-03-07 RX ADMIN — GABAPENTIN 300 MG: 300 CAPSULE ORAL at 08:03

## 2024-03-07 RX ADMIN — INSULIN ASPART 10 UNITS: 100 INJECTION, SOLUTION INTRAVENOUS; SUBCUTANEOUS at 04:03

## 2024-03-07 RX ADMIN — Medication 10 ML: at 05:03

## 2024-03-07 RX ADMIN — ATORVASTATIN CALCIUM 40 MG: 40 TABLET, FILM COATED ORAL at 08:03

## 2024-03-07 RX ADMIN — DIBASIC SODIUM PHOSPHATE, MONOBASIC POTASSIUM PHOSPHATE AND MONOBASIC SODIUM PHOSPHATE 2 TABLET: 852; 155; 130 TABLET ORAL at 05:03

## 2024-03-07 RX ADMIN — APIXABAN 5 MG: 5 TABLET, FILM COATED ORAL at 09:03

## 2024-03-07 RX ADMIN — DIBASIC SODIUM PHOSPHATE, MONOBASIC POTASSIUM PHOSPHATE AND MONOBASIC SODIUM PHOSPHATE 2 TABLET: 852; 155; 130 TABLET ORAL at 09:03

## 2024-03-07 RX ADMIN — INSULIN ASPART 10 UNITS: 100 INJECTION, SOLUTION INTRAVENOUS; SUBCUTANEOUS at 12:03

## 2024-03-07 RX ADMIN — TAMSULOSIN HYDROCHLORIDE 0.4 MG: 0.4 CAPSULE ORAL at 08:03

## 2024-03-07 RX ADMIN — HYDRALAZINE HYDROCHLORIDE 100 MG: 50 TABLET ORAL at 06:03

## 2024-03-07 RX ADMIN — ACETAMINOPHEN 650 MG: 325 TABLET ORAL at 10:03

## 2024-03-07 NOTE — CONSULTS
[facility] - Telemetry Stepdown  Wound Care    Patient Name:  Saji Castañeda   MRN:  [id number]  Date: 3/7/2024  Diagnosis: Diabetic ketoacidosis without coma associated with type 2 diabetes mellitus    History:     Past Medical History:   Diagnosis Date    Arthritis     legs    Bacteremia due to Gram-negative bacteria 3/23/2021    Diabetes mellitus     Diabetes mellitus, type 2     Hyperlipidemia     Hypertension     Osteomyelitis     Palliative care encounter 5/24/2023       Social History     Socioeconomic History    Marital status: Single   Tobacco Use    Smoking status: Never    Smokeless tobacco: Never   Substance and Sexual Activity    Alcohol use: No     Comment: occassional    Drug use: No    Sexual activity: Not Currently   Social History Narrative    Not currently working; lives with family     Social Determinants of Health     Financial Resource Strain: Medium Risk (3/5/2024)    Overall Financial Resource Strain (CARDIA)     Difficulty of Paying Living Expenses: Somewhat hard   Food Insecurity: No Food Insecurity (3/5/2024)    Hunger Vital Sign     Worried About Running Out of Food in the Last Year: Never true     Ran Out of Food in the Last Year: Never true   Transportation Needs: No Transportation Needs (3/5/2024)    PRAPARE - Transportation     Lack of Transportation (Medical): No     Lack of Transportation (Non-Medical): No   Physical Activity: Inactive (3/5/2024)    Exercise Vital Sign     Days of Exercise per Week: 0 days     Minutes of Exercise per Session: 0 min   Stress: No Stress Concern Present (3/5/2024)    [employer] - Occupational Stress Questionnaire     Feeling of Stress : Only a little   Social Connections: Socially Isolated (3/5/2024)    Social Connection and Isolation Panel [NHANES]     Frequency of Communication with Friends and Family: More than three times a week     Frequency of Social Gatherings with Friends and Family: Patient declined     Attends  [employer] Services: Never     Active Member of Clubs or Organizations: No     Attends Club or Organization Meetings: Never     Marital Status: Never    Housing Stability: Low Risk  (3/5/2024)    Housing Stability Vital Sign     Unable to Pay for Housing in the Last Year: No     Number of Places Lived in the Last Year: 1     Unstable Housing in the Last Year: No       Precautions:     Allergies as of 03/05/2024    (No Known Allergies)       WO Assessment Details/Treatment     Patient seen for wound care consultation.   Reviewed chart for this encounter.   See Flow Sheet for findings.      RECOMMENDATIONS: MD consult for left leg. Patient seen and assessed by inpatient wound care. Patient is being followed by podiatry with wound care orders. Please follow current wound care orders. Primary team aware, no need for inpatient wound care at this time. Please re-consult if needed. Inpatient wound care sign off.    Discussed POC with patient and primary nurse.   See EMR for orders & patient education.    Discussed nutrition and the role of protein in wound healing with the patient. Instructed patient to optimize protein for wound healing.    Bedside nursing to continue care & monitoring.  Bedside nursing to maintain pressure injury prevention interventions.          03/07/24 1200   WOCN Assessment   WOCN Total Time (mins) 30   Visit Date 03/07/24   Visit Time 1200   Consult Type New   WOCN Speciality Wound   Intervention assessed;chart review;coordination of care   Teaching on-going        Altered Skin Integrity 03/06/24 1000 Left dorsal Greater trochanter   Date First Assessed/Time First Assessed: 03/06/24 1000   Altered Skin Integrity Present on Admission - Did Patient arrive to the hospital with altered skin?: yes  Side: Left  Orientation: dorsal  Location: Greater trochanter  Is this injury device rel...   Wound Image          Trip Yarbrough RN  03/07/2024

## 2024-03-07 NOTE — SUBJECTIVE & OBJECTIVE
"Medications Prior to Admission   Medication Sig Dispense Refill Last Dose    acetaminophen (TYLENOL) 325 MG tablet Take 650 mg by mouth every 6 (six) hours as needed for Temperature greater than.   Unknown    acidophilus-pectin, citrus 100 million cell-10 mg Cap Take 1 capsule by mouth 2 (two) times a day.   Unknown    apixaban (ELIQUIS) 5 mg Tab Take 1 tablet (5 mg total) by mouth 2 (two) times daily. 60 tablet 0 Unknown    ascorbic acid, vitamin C, (VITAMIN C) 500 MG tablet Take 500 mg by mouth 2 (two) times daily.   Unknown    B COMPLEX-VITAMIN B12 tablet Take 1 tablet by mouth once daily.   Unknown    BD AUTOSHIELD DUO PEN NEEDLE 30 gauge x 3/16" Ndle    Unknown    BD ULTRA-FINE ADITYA PEN NEEDLE 32 gauge x 5/32" Ndle USE FOUR TIMES DAILY WITH MEALS AND NIGHTLY 100 each 0 Unknown    blood sugar diagnostic (CONTOUR TEST STRIPS) Strp Inject 1 strip into the skin 2 (two) times daily before meals. 100 strip 6 Unknown    clopidogreL (PLAVIX) 75 mg tablet Take 1 tablet (75 mg total) by mouth once daily. 60 tablet 0     diphenhydrAMINE (BENADRYL) 25 mg capsule No directions listed on the patients medication list.   Unknown    furosemide (LASIX) 40 MG tablet Take 20 mg by mouth.   Unknown    gabapentin (NEURONTIN) 300 MG capsule Take 300 mg by mouth 2 (two) times daily.   Unknown    glipiZIDE (GLUCOTROL) 10 MG tablet Take 20 mg by mouth 2 (two) times a day.   Unknown    hydrALAZINE (APRESOLINE) 100 MG tablet Take 1 tablet (100 mg total) by mouth every 8 (eight) hours. 90 tablet 11 Unknown    insulin aspart U-100 (NOVOLOG) 100 unit/mL (3 mL) InPn pen Inject 6 Units into the skin 3 (three) times daily with meals. 30 mL 3 Unknown    isosorbide dinitrate (ISORDIL) 10 MG tablet Take 1 tablet (10 mg total) by mouth 3 (three) times daily. 90 tablet 11     LANTUS SOLOSTAR U-100 INSULIN glargine 100 units/mL SubQ pen Inject 45 Units into the skin every evening. (Patient taking differently: Inject 45 Units into the skin once " "daily.)  0 Unknown    miconazole NITRATE 2 % (MICOTIN) 2 % top powder Apply topically 2 (two) times daily. 20 g 0 Unknown    MICROLET LANCET Misc   0 Unknown    multivitamin (THERAGRAN) per tablet Take 1 tablet by mouth once daily.   Unknown    NIFEdipine (PROCARDIA-XL) 60 MG (OSM) 24 hr tablet Take 1 tablet (60 mg total) by mouth once daily. 30 tablet 2 Unknown    oxyCODONE 5 mg TbOr Take 1 tablet by mouth every 6 (six) hours as needed (Pain (Max 5 daily)).   Unknown    pantoprazole (PROTONIX) 40 MG tablet Take 40 mg by mouth once daily. Before breakfast   Unknown    pen needle, diabetic 32 gauge x 5/32" Ndle use as directed with insulin 100 each 2 Unknown    rosuvastatin (CRESTOR) 40 MG Tab Take 40 mg by mouth once daily.   Unknown    sodium hypochlorite 0.5 % (DAKIN'S SOLUTION) external solution Apply topically once daily.   Unknown    tamsulosin (FLOMAX) 0.4 mg Cap Take 0.4 mg by mouth once daily.   Unknown    triamcinolone acetonide 0.025% (KENALOG) 0.025 % Oint Apply topically 2 (two) times daily as needed (to affected area).   Unknown       Review of patient's allergies indicates:  No Known Allergies    Past Medical History:   Diagnosis Date    Arthritis     legs    Diabetes mellitus     Diabetes mellitus, type 2     Hyperlipidemia     Hypertension     Osteomyelitis     Palliative care encounter 5/24/2023     Past Surgical History:   Procedure Laterality Date    ANGIOGRAPHY OF LOWER EXTREMITY N/A 2/3/2021    Procedure: Angiogram Extremity Bilateral;  Surgeon: Ernst Chacko MD;  Location: [address];  Service: Peripheral Vascular;  Laterality: N/A;  7.4 mintues fluoroscopy time  816.15 mGy  170.17 Gycm2    AORTOGRAPHY WITH EXTREMITY RUNOFF Bilateral 2/3/2021    Procedure: AORTOGRAM, WITH EXTREMITY RUNOFF;  Surgeon: Ernst Chacko MD;  [facility] [place] [address] [place] [address];  Service: Peripheral Vascular;  Laterality: Bilateral;    DEBRIDEMENT OF FOOT Left 2/23/2021    Procedure: DEBRIDEMENT, LEFT HEEL;  " Surgeon: Mayra Schroedre DPM;  Location: [facility] OR [address] [place] [address];  Service: Podiatry;  Laterality: Left;    FOOT AMPUTATION  October 2010    left high midfoot amputation    IMPLANTATION OF LEADLESS PACEMAKER N/A 10/12/2023    Procedure: [id number];  Surgeon: VANESSA Delatorre MD;  Location: [facility] EP LAB;  Service: Cardiology;  Laterality: N/A;  AVB, MICRA, EH, [place], [place] [address]     Family History       Problem Relation (Age of Onset)    Cancer Brother    Diabetes Mother, Sister    Heart disease Mother    Stroke Sister          Tobacco Use    Smoking status: Never    Smokeless tobacco: Never   Substance and Sexual Activity    Alcohol use: No     Comment: occassional    Drug use: No    Sexual activity: Not Currently     Review of Systems   Constitutional:  Positive for chills and fatigue. Negative for diaphoresis and fever.   HENT:  Negative for congestion, ear pain, nosebleeds, rhinorrhea and sore throat.    Eyes:  Negative for pain.   Respiratory:  Negative for cough, shortness of breath and wheezing.    Cardiovascular:  Positive for leg swelling. Negative for chest pain.   Gastrointestinal:  Negative for abdominal pain, constipation, diarrhea, nausea and vomiting.   Genitourinary:  Negative for dysuria, frequency and urgency.   Musculoskeletal:  Positive for arthralgias, gait problem and myalgias. Negative for back pain and neck pain.   Skin:  Positive for color change and wound.   Neurological:  Negative for weakness, numbness and headaches.     Objective:     Vital Signs (Most Recent):  Temp: 99.8 °F (37.7 °C) (03/07/24 0726)  Pulse: 101 (03/07/24 0726)  Resp: 18 (03/07/24 0726)  BP: (!) 149/70 (03/07/24 0726)  SpO2: (!) 91 % (03/07/24 0726) Vital Signs (24h Range):  Temp:  [98.3 °F (36.8 °C)-99.8 °F (37.7 °C)] 99.8 °F (37.7 °C)  Pulse:  [] 101  Resp:  [15-19] 18  SpO2:  [81 %-100 %] 91 %  BP: (149-160)/(66-80) 149/70     Weight: 120.2 kg (265 lb)  Body mass index is 39.13 kg/m².       Physical Exam  Constitutional:       Appearance: He is obese.   HENT:      Mouth/Throat:      Mouth: Mucous membranes are moist.   Cardiovascular:      Rate and Rhythm: Normal rate and regular rhythm.      Comments: Bilateral femoral pulses 2+. L AT and PT triphasic. R AT triphasic.   Pulmonary:      Effort: Pulmonary effort is normal.   Abdominal:      General: There is no distension.   Musculoskeletal:         General: Swelling, tenderness and deformity present.      Comments: S/p L Chopart amp, healing poorly   Skin:     General: Skin is dry.      Findings: Bruising, erythema and lesion present.   Neurological:      General: No focal deficit present.          Significant Labs:  BMP:   Recent Labs   Lab 03/07/24  0531   *  165*     139   K 4.2  4.2     108   CO2 23  23   BUN 21  21   CREATININE 1.4  1.4   CALCIUM 8.7  8.7   MG 2.0     CBC:   Recent Labs   Lab 03/06/24  0847   WBC 10.65   RBC 3.88*   HGB 9.7*   HCT 32.4*      MCV 84   MCH 25.0*   MCHC 29.9*     Significant Diagnostics:  I have reviewed and interpreted all pertinent imaging results/findings within the past 24 hours.

## 2024-03-07 NOTE — ASSESSMENT & PLAN NOTE
Patient with Persistent (7 days or more) atrial fibrillation which is uncontrolled currently with    . Patient is currently in sinus rhythm.[id number] Score: 4. . Anticoagulation indicated. Anticoagulation done with lovenox as we could not get IV access for heparin  .    -Restart plavix and apixaban

## 2024-03-07 NOTE — PLAN OF CARE
Problem: Adult Inpatient Plan of Care  Goal: Plan of Care Review  3/7/2024 0715 by Sofia García RN  Outcome: Ongoing, Progressing  3/7/2024 0714 by Sofia García RN  Outcome: Ongoing, Progressing  Goal: Patient-Specific Goal (Individualized)  3/7/2024 0715 by Sofia García RN  Outcome: Ongoing, Progressing  3/7/2024 0714 by Sofia García RN  Outcome: Ongoing, Progressing  Goal: Absence of Hospital-Acquired Illness or Injury  3/7/2024 0715 by Sofia García RN  Outcome: Ongoing, Progressing  3/7/2024 0714 by Sofia García RN  Outcome: Ongoing, Progressing  Goal: Optimal Comfort and Wellbeing  3/7/2024 0715 by Sofia García RN  Outcome: Ongoing, Progressing  3/7/2024 0714 by Sofia García RN  Outcome: Ongoing, Progressing  Goal: Readiness for Transition of Care  3/7/2024 0715 by Sofia García RN  Outcome: Ongoing, Progressing  3/7/2024 0714 by Sofia García RN  Outcome: Ongoing, Progressing     Problem: Diabetic Ketoacidosis  Goal: Fluid and Electrolyte Balance with Absence of Ketosis  3/7/2024 0715 by Sofia García RN  Outcome: Ongoing, Progressing  3/7/2024 0714 by Sofia García RN  Outcome: Ongoing, Progressing

## 2024-03-07 NOTE — PROGRESS NOTES
[facility] Medicine  Progress Note    Patient Name: Saji Castañeda  MRN: [id number]  Patient Class: IP- Inpatient   Admission Date: 3/5/2024  Length of Stay: 2 days  Attending Physician: Seymour Cullen MD  Primary Care Provider: [facility] -        Subjective:     Principal Problem:Diabetic ketoacidosis without coma associated with type 2 diabetes mellitus        HPI:  Saji Castañeda is a 74 y.o. male with a medical history of DM2, HLD, HTN, chronic osteomyelitis of L heel, L TMA, PAD, hx of ESBL klebsiella, acinetobacter bacteremia, presenting with hyperglycemia. He presented to the ED via EMS and his POCT glucose on arrival was >500. Serum blood glucose 667 with anion gap of 17 and elevated ketones. Serum potassium 3.4, corrected sodium 149. Urinalysis significant for RBCs, glucosuria, and moderate bacteria and yeast. CBC revealed leukocytosis, elevated platelets, and mild anemia. CMP shows Na 135, K 3.4, BUN 29, Cr 2.1, Glucose 677, Lactate 4.61. BNP and troponin elevated. GFR 32.2. Insulin drip, IV fluids, zosyn, vancomycin, and potassium given.      He is unable to provide much history, but states that he has not been taking his home medications for at least a week. He reports shortness of breath, fatigue, and weakness for the last 6-7 days. He has chills and reports episodes of emesis. He denies abdominal pain, chest pain, palpitations, recent illness/infection, fevers, changes to bowel movements and urinary output. Patient lives with his brother and sister at home. He was last seen in the ED on 2/21 after a fall. He was hypoglycemic BGL 60 at that time which returned to normal after eating. He was also scheduled for a wound clinic appointment today 3/5 at [facility] with Dr. Wilson.        Overview/Hospital Course:  No notes on file    Interval History: overnight patient had rapids called because of desaturation but it was mainly because he was removing his NC.     Review of  Systems    Constitutional:  Positive for activity change, appetite change, chills and fatigue.   Respiratory:  Positive for shortness of breath. Negative for cough.    Cardiovascular:  Positive for leg swelling. Negative for chest pain.   Gastrointestinal:  Negative for abdominal pain, diarrhea, nausea and vomiting.   Genitourinary:  Positive for dysuria. Negative for frequency and urgency.   Neurological:  Positive for weakness. Negative for seizures, light-headedness and numbness.   Psychiatric/Behavioral:  Positive for decreased concentration. Negative for agitation and behavioral problem  Objective:     Vital Signs (Most Recent):  Temp: 98.9 °F (37.2 °C) (03/07/24 1104)  Pulse: 76 (03/07/24 1125)  Resp: 19 (03/07/24 1104)  BP: (!) 159/73 (03/07/24 1104)  SpO2: (!) 92 % (03/07/24 1104) Vital Signs (24h Range):  Temp:  [98.9 °F (37.2 °C)-99.8 °F (37.7 °C)] 98.9 °F (37.2 °C)  Pulse:  [] 76  Resp:  [16-19] 19  SpO2:  [81 %-100 %] 92 %  BP: (149-165)/(66-80) 159/73     Weight: 120.2 kg (265 lb)  Body mass index is 39.13 kg/m².    Intake/Output Summary (Last 24 hours) at 3/7/2024 1151  Last data filed at 3/7/2024 0844  Gross per 24 hour   Intake 600 ml   Output --   Net 600 ml         Physical Exam      Constitutional:       Appearance: He is obese. He is ill-appearing.      Comments: lethargic   Cardiovascular:      Rate and Rhythm: Regular rhythm. Tachycardia present.      Pulses: Normal pulses.      Heart sounds: Normal heart sounds.   Pulmonary:      Effort: Pulmonary effort is normal.      Breath sounds: Normal breath sounds.   Musculoskeletal:         General: Swelling present.      Comments: Patient has very dry bark like skin of both legs, left foot amputation, with left leg wounds, he has multiple large wounds with purulent foul smelling abscess and ulceration.   Neurological:      General: No focal deficit present.      Mental Status: He is oriented to person, place, and time.     Significant Labs: All  pertinent labs within the past 24 hours have been reviewed.    Significant Imaging: I have reviewed all pertinent imaging results/findings within the past 24 hours.    Assessment/Plan:      * Diabetic ketoacidosis without coma associated with type 2 diabetes mellitus  Patient's FSGs are uncontrolled due to hyperglycemia on current medication regimen.  Last A1c reviewed-   Lab Results   Component Value Date    HGBA1C >14.0 (H) 03/05/2024     Most recent fingerstick glucose reviewed-   Recent Labs   Lab 03/06/24  2135 03/07/24  0756   POCTGLUCOSE 233* 241*       Current correctional scale   Insulin drip  Maintain anti-hyperglycemic dose as follows-   Antihyperglycemics (From admission, onward)      Start     Stop Route Frequency Ordered    03/07/24 1130  insulin aspart U-100 pen 10 Units         -- SubQ 3 times daily with meals 03/07/24 1028    03/05/24 1800  insulin detemir U-100 (Levemir) pen 30 Units         -- SubQ Nightly 03/05/24 1713          Hold Oral hypoglycemics while patient is in the hospital.  Will keep patient on DKA pathway with insulin drip and fluid with protocol in place for switching to subcutaneous insulin as anion gap closes.      DKA  HHS  - DKA/HHS Pathway initiated  - Blood sugar on arrival 677 with a bicarb 29.7, anion gap 14, BHB 1.4 and pH 7.3  - HbA1c 14  - Likely induced by lack of medication and/or infection  - Home DM regimen:  Insulin and Glipizide  - Start insulin gtt per protocol with q1h accuchecks while on the drip.    - Once Bicarb >18, Anion gap <10, Glucose <200 on 2 readings and able to tolerate PO intake without nausea and vomiting, transition to subq insulin with a 1-2 h overlap with drip.    Long acting insulin dose:  Detemir (0.25mg/kg) daily or in 2 doses  Short acting insulin dose:  Aspart (0.08mg/kg) units per meal  - Start normal saline at 125cc/hr.  Once blood sugar is 200, change IVF to D5 1/2 NS at 50cc/h  - Monitor electrolytes with BMP q4h while on insulin gtt and  place on tele  - Bariatric clear liquid diet while on insulin gtt then change to Diabetic diet when initiating subq insulin with accuchecks 4x daily before meals and at bedtime (AC and HS)  - Endocrine consult         Stage 3b chronic kidney disease  Creatine stable for now. BMP reviewed- noted Estimated Creatinine Clearance: 48.1 mL/min (A) (based on SCr of 1.7 mg/dL (H)). according to latest data. Based on current GFR, CKD stage is stage 3 - GFR 30-59.  Monitor UOP and serial BMP and adjust therapy as needed. Renally dose meds. Avoid nephrotoxic medications and procedures.    Severe sepsis  This patient does have evidence of infective focus  My overall impression is sepsis.  Source: Skin and Soft Tissue (location left leg)  Antibiotics given-   Antibiotics (72h ago, onward)      Start     Stop Route Frequency Ordered    03/06/24 2100  amoxicillin-clavulanate 875-125mg per tablet 1 tablet         -- Oral Every 12 hours 03/06/24 1420          Latest lactate reviewed-  Recent Labs   Lab 03/05/24  0909 03/05/24  1223 03/06/24  0847   LACTATE  --    < > 2.2   POCLAC 4.61*  --   --     < > = values in this interval not displayed.       Organ dysfunction indicated by Acute kidney injury    Fluid challenge Actual Body weight- Patient will receive 30ml/kg actual body weight to calculate fluid bolus for treatment of septic shock.     Post- resuscitation assessment No - Post resuscitation assessment not needed       Will Not start Pressors- Levophed for MAP of 65  -Trend lactate  -Inpatient consult to ID recommended augmentin and involving vascular surgery. Patient refused amputation.  -Continue IV antibiotics  -Follow up blood cultures      Other hyperlipidemia  Continue home atorvastatin      Paroxysmal atrial fibrillation  Patient with Persistent (7 days or more) atrial fibrillation which is uncontrolled currently with    . Patient is currently in sinus rhythm.[id number] Score: 4. . Anticoagulation indicated.  Anticoagulation done with lovenox as we could not get IV access for heparin  .    -Restart plavix and apixaban    Cellulitis of left lower leg  ID is following patient for cellulitis of left lower leg and recommended augmentin.      Peripheral arterial disease  Resume  plavix      Essential hypertension  Chronic, uncontrolled. Latest blood pressure and vitals reviewed-     Temp:  [98.9 °F (37.2 °C)-99.8 °F (37.7 °C)]   Pulse:  []   Resp:  [16-19]   BP: (137-165)/(63-80)   SpO2:  [81 %-99 %] .   Home meds for hypertension were reviewed and noted below.   Hypertension Medications               furosemide (LASIX) 40 MG tablet Take 20 mg by mouth.    hydrALAZINE (APRESOLINE) 100 MG tablet Take 1 tablet (100 mg total) by mouth every 8 (eight) hours.    isosorbide dinitrate (ISORDIL) 10 MG tablet Take 1 tablet (10 mg total) by mouth 3 (three) times daily.    NIFEdipine (PROCARDIA-XL) 60 MG (OSM) 24 hr tablet Take 1 tablet (60 mg total) by mouth once daily.            While in the hospital, will manage blood pressure as follows; Adjust home antihypertensive regimen as follows- will keep hydralazine but hold valsartan and lasix    Will utilize p.r.n. blood pressure medication only if patient's blood pressure greater than 180/110 and he develops symptoms such as worsening chest pain or shortness of breath.      VTE Risk Mitigation (From admission, onward)           Ordered     enoxaparin injection 120 mg  Every 12 hours         03/05/24 2121                    Discharge Planning   OXANA: 3/11/2024     Code Status: Full Code   Is the patient medically ready for discharge?: No    Reason for patient still in hospital (select all that apply): Treatment  Discharge Plan A: Long-term acute care facility (LTAC)                  Lynette Perkins MD  Department of Hospital Medicine   [facility] - Tele[facility] Stepdown

## 2024-03-07 NOTE — SUBJECTIVE & OBJECTIVE
Interval History: overnight patient had rapids called because of desaturation but it was mainly because he was removing his NC.     Review of Systems    Constitutional:  Positive for activity change, appetite change, chills and fatigue.   Respiratory:  Positive for shortness of breath. Negative for cough.    Cardiovascular:  Positive for leg swelling. Negative for chest pain.   Gastrointestinal:  Negative for abdominal pain, diarrhea, nausea and vomiting.   Genitourinary:  Positive for dysuria. Negative for frequency and urgency.   Neurological:  Positive for weakness. Negative for seizures, light-headedness and numbness.   Psychiatric/Behavioral:  Positive for decreased concentration. Negative for agitation and behavioral problem  Objective:     Vital Signs (Most Recent):  Temp: 98.9 °F (37.2 °C) (03/07/24 1104)  Pulse: 76 (03/07/24 1125)  Resp: 19 (03/07/24 1104)  BP: (!) 159/73 (03/07/24 1104)  SpO2: (!) 92 % (03/07/24 1104) Vital Signs (24h Range):  Temp:  [98.9 °F (37.2 °C)-99.8 °F (37.7 °C)] 98.9 °F (37.2 °C)  Pulse:  [] 76  Resp:  [16-19] 19  SpO2:  [81 %-100 %] 92 %  BP: (149-165)/(66-80) 159/73     Weight: 120.2 kg (265 lb)  Body mass index is 39.13 kg/m².    Intake/Output Summary (Last 24 hours) at 3/7/2024 1151  Last data filed at 3/7/2024 0844  Gross per 24 hour   Intake 600 ml   Output --   Net 600 ml         Physical Exam      Constitutional:       Appearance: He is obese. He is ill-appearing.      Comments: lethargic   Cardiovascular:      Rate and Rhythm: Regular rhythm. Tachycardia present.      Pulses: Normal pulses.      Heart sounds: Normal heart sounds.   Pulmonary:      Effort: Pulmonary effort is normal.      Breath sounds: Normal breath sounds.   Musculoskeletal:         General: Swelling present.      Comments: Patient has very dry bark like skin of both legs, left foot amputation, with left leg wounds, he has multiple large wounds with purulent foul smelling abscess and ulceration.    Neurological:      General: No focal deficit present.      Mental Status: He is oriented to person, place, and time.     Significant Labs: All pertinent labs within the past 24 hours have been reviewed.    Significant Imaging: I have reviewed all pertinent imaging results/findings within the past 24 hours.

## 2024-03-07 NOTE — ASSESSMENT & PLAN NOTE
73 y/o male with a.fib, DMII uncontrolled, HLD, HTN, chronic heel ulcerations c/b chronic osteomyelitis, hx of  Acinetobacter and ESBL klebsiella admitted for DKA. ID consulted for chronic wounds/ulcerations and abx guidance as with hx of MDROs. Remained afebrile, no leukocytosis. Blood cultures remain NGTD.       Recommendations  Continue augmentin for now. Per podiatry, cultures obtained from left heel. Wounds probe to bone. Will tailor abx accordingly based on final culture data   Would hold off on further imaging unless with progression of wounds/ulcerations or swelling  Blood culture remain NGTD.     Discussed with ID staff

## 2024-03-07 NOTE — ASSESSMENT & PLAN NOTE
This patient does have evidence of infective focus  My overall impression is sepsis.  Source: Skin and Soft Tissue (location left leg)  Antibiotics given-   Antibiotics (72h ago, onward)      Start     Stop Route Frequency Ordered    03/06/24 2100  amoxicillin-clavulanate 875-125mg per tablet 1 tablet         -- Oral Every 12 hours 03/06/24 1420          Latest lactate reviewed-  Recent Labs   Lab 03/05/24  0909 03/05/24  1223 03/06/24  0847   LACTATE  --    < > 2.2   POCLAC 4.61*  --   --     < > = values in this interval not displayed.       Organ dysfunction indicated by Acute kidney injury    Fluid challenge Actual Body weight- Patient will receive 30ml/kg actual body weight to calculate fluid bolus for treatment of septic shock.     Post- resuscitation assessment No - Post resuscitation assessment not needed       Will Not start Pressors- Levophed for MAP of 65  -Trend lactate  -Inpatient consult to ID recommended augmentin and involving vascular surgery. Patient refused amputation.  -Continue IV antibiotics  -Follow up blood cultures

## 2024-03-07 NOTE — CONSULTS
"[facility] - Telemetry Stepdown  Vascular Surgery  Consult Note    Inpatient consult to Vascular Surgery  Consult performed by: Tyra Aguero DPM  Consult ordered by: Brooks Trujillo DPM  Reason for consult: consideration of BKA vs AKA due to LLU calc OM        Subjective:     Chief Complaint/Reason for Admission: DKA    History of Present Illness: Saji Castañeda is a 73 yo M with a history of Afib, T2D, HLD, HTN, chronic OM of L heel, s/p L TMA, PID, ESBL Klebsiella and Acinetobacter bacteremia who was admitted after being found down at home and DKA.    Vascular Surgery was consulted to evalutation for a possible BKA vs AKA due to LLE calcaneaus OM. During his previous admission in May 2023 Vascular surgery was consulted and recommended BKA which he was not amendable to. Patient is still not amendable to amputation. Patient admits to continuous pain to his b/l legs and feet. Says he does not walk. Denies f/c/n/v. Denies any other complaints.     VSS. Hypertensive. Afebrile.     Medications Prior to Admission   Medication Sig Dispense Refill Last Dose    acetaminophen (TYLENOL) 325 MG tablet Take 650 mg by mouth every 6 (six) hours as needed for Temperature greater than.   Unknown    acidophilus-pectin, citrus 100 million cell-10 mg Cap Take 1 capsule by mouth 2 (two) times a day.   Unknown    apixaban (ELIQUIS) 5 mg Tab Take 1 tablet (5 mg total) by mouth 2 (two) times daily. 60 tablet 0 Unknown    ascorbic acid, vitamin C, (VITAMIN C) 500 MG tablet Take 500 mg by mouth 2 (two) times daily.   Unknown    B COMPLEX-VITAMIN B12 tablet Take 1 tablet by mouth once daily.   Unknown    BD AUTOSHIELD DUO PEN NEEDLE 30 gauge x 3/16" Ndle    Unknown    BD ULTRA-FINE ADITYA PEN NEEDLE 32 gauge x 5/32" Ndle USE FOUR TIMES DAILY WITH MEALS AND NIGHTLY 100 each 0 Unknown    blood sugar diagnostic (CONTOUR TEST STRIPS) Strp Inject 1 strip into the skin 2 (two) times daily before meals. 100 strip 6 Unknown    clopidogreL (PLAVIX) 75 " "mg tablet Take 1 tablet (75 mg total) by mouth once daily. 60 tablet 0     diphenhydrAMINE (BENADRYL) 25 mg capsule No directions listed on the patients medication list.   Unknown    furosemide (LASIX) 40 MG tablet Take 20 mg by mouth.   Unknown    gabapentin (NEURONTIN) 300 MG capsule Take 300 mg by mouth 2 (two) times daily.   Unknown    glipiZIDE (GLUCOTROL) 10 MG tablet Take 20 mg by mouth 2 (two) times a day.   Unknown    hydrALAZINE (APRESOLINE) 100 MG tablet Take 1 tablet (100 mg total) by mouth every 8 (eight) hours. 90 tablet 11 Unknown    insulin aspart U-100 (NOVOLOG) 100 unit/mL (3 mL) InPn pen Inject 6 Units into the skin 3 (three) times daily with meals. 30 mL 3 Unknown    isosorbide dinitrate (ISORDIL) 10 MG tablet Take 1 tablet (10 mg total) by mouth 3 (three) times daily. 90 tablet 11     LANTUS SOLOSTAR U-100 INSULIN glargine 100 units/mL SubQ pen Inject 45 Units into the skin every evening. (Patient taking differently: Inject 45 Units into the skin once daily.)  0 Unknown    miconazole NITRATE 2 % (MICOTIN) 2 % top powder Apply topically 2 (two) times daily. 20 g 0 Unknown    MICROLET LANCET Misc   0 Unknown    multivitamin (THERAGRAN) per tablet Take 1 tablet by mouth once daily.   Unknown    NIFEdipine (PROCARDIA-XL) 60 MG (OSM) 24 hr tablet Take 1 tablet (60 mg total) by mouth once daily. 30 tablet 2 Unknown    oxyCODONE 5 mg TbOr Take 1 tablet by mouth every 6 (six) hours as needed (Pain (Max 5 daily)).   Unknown    pantoprazole (PROTONIX) 40 MG tablet Take 40 mg by mouth once daily. Before breakfast   Unknown    pen needle, diabetic 32 gauge x 5/32" Ndle use as directed with insulin 100 each 2 Unknown    rosuvastatin (CRESTOR) 40 MG Tab Take 40 mg by mouth once daily.   Unknown    sodium hypochlorite 0.5 % (DAKIN'S SOLUTION) external solution Apply topically once daily.   Unknown    tamsulosin (FLOMAX) 0.4 mg Cap Take 0.4 mg by mouth once daily.   Unknown    triamcinolone acetonide 0.025% " (KENALOG) 0.025 % Oint Apply topically 2 (two) times daily as needed (to affected area).   Unknown     Review of patient's allergies indicates:  No Known Allergies    Past Medical History:   Diagnosis Date    Arthritis     legs    Diabetes mellitus     Diabetes mellitus, type 2     Hyperlipidemia     Hypertension     Osteomyelitis     Palliative care encounter 5/24/2023     Past Surgical History:   Procedure Laterality Date    ANGIOGRAPHY OF LOWER EXTREMITY N/A 2/3/2021    Procedure: Angiogram Extremity Bilateral;  Surgeon: Ernst Chacko MD;  Location: [facility] OR [address] [place] [address];  Service: Peripheral Vascular;  Laterality: N/A;  7.4 mintues fluoroscopy time  816.15 mGy  170.17 Gycm2    AORTOGRAPHY WITH EXTREMITY RUNOFF Bilateral 2/3/2021    Procedure: AORTOGRAM, WITH EXTREMITY RUNOFF;  Surgeon: Ernst Chacko MD;  Location: [facility] OR [address] [place] [address];  Service: Peripheral Vascular;  Laterality: Bilateral;    DEBRIDEMENT OF FOOT Left 2/23/2021    Procedure: DEBRIDEMENT, LEFT HEEL;  Surgeon: Mayra Schroeder DPM;  Location: [facility] OR [address] [place] [address];  Service: Podiatry;  Laterality: Left;    FOOT AMPUTATION  October 2010    left high midfoot amputation    IMPLANTATION OF LEADLESS PACEMAKER N/A 10/12/2023    Procedure: [id number];  Surgeon: VANESSA Delatorre MD;  Location: [facility] EP LAB;  Service: Cardiology;  Laterality: N/A;  AVB, MICRA, EH, ANES, [place] [id number]     Family History       Problem Relation (Age of Onset)    Cancer Brother    Diabetes Mother, Sister    Heart disease Mother    Stroke Sister          Tobacco Use    Smoking status: Never    Smokeless tobacco: Never   Substance and Sexual Activity    Alcohol use: No     Comment: occassional    Drug use: No    Sexual activity: Not Currently     Review of Systems   Constitutional:  Positive for chills and fatigue. Negative for diaphoresis and fever.   HENT:  Negative for congestion, ear pain, nosebleeds, rhinorrhea and sore throat.    Eyes:  Negative for pain.    Respiratory:  Negative for cough, shortness of breath and wheezing.    Cardiovascular:  Positive for leg swelling. Negative for chest pain.   Gastrointestinal:  Negative for abdominal pain, constipation, diarrhea, nausea and vomiting.   Genitourinary:  Negative for dysuria, frequency and urgency.   Musculoskeletal:  Positive for arthralgias, gait problem and myalgias. Negative for back pain and neck pain.   Skin:  Positive for color change and wound.   Neurological:  Negative for weakness, numbness and headaches.     Objective:     Vital Signs (Most Recent):  Temp: 99.8 °F (37.7 °C) (03/07/24 0726)  Pulse: 101 (03/07/24 0726)  Resp: 18 (03/07/24 0726)  BP: (!) 149/70 (03/07/24 0726)  SpO2: (!) 91 % (03/07/24 0726) Vital Signs (24h Range):  Temp:  [98.3 °F (36.8 °C)-99.8 °F (37.7 °C)] 99.8 °F (37.7 °C)  Pulse:  [] 101  Resp:  [15-19] 18  SpO2:  [81 %-100 %] 91 %  BP: (149-160)/(66-80) 149/70     Weight: 120.2 kg (265 lb)  Body mass index is 39.13 kg/m².      Physical Exam  Constitutional:       Appearance: He is obese.   HENT:      Mouth/Throat:      Mouth: Mucous membranes are moist.   Cardiovascular:      Rate and Rhythm: Normal rate and regular rhythm.      Comments: Bilateral femoral pulses 2+. L AT and PT triphasic. R AT triphasic.   Pulmonary:      Effort: Pulmonary effort is normal.   Abdominal:      General: There is no distension.   Musculoskeletal:         General: Swelling, tenderness and deformity present.      Comments: S/p L Chopart amp, healing poorly   Skin:     General: Skin is dry.      Findings: Bruising, erythema and lesion present.   Neurological:      General: No focal deficit present.     Significant Labs:  BMP:   Recent Labs   Lab 03/07/24  0531   *  165*     139   K 4.2  4.2     108   CO2 23  23   BUN 21  21   CREATININE 1.4  1.4   CALCIUM 8.7  8.7   MG 2.0     CBC:   Recent Labs   Lab 03/06/24  0847   WBC 10.65   RBC 3.88*   HGB 9.7*   HCT 32.4*       MCV 84   MCH 25.0*   MCHC 29.9*     Significant Diagnostics:  I have reviewed and interpreted all pertinent imaging results/findings within the past 24 hours.  Assessment/Plan:     Osteomyelitis  Vascular Surgery consulted for consideration of BKA vs AKA due to LLE calcaneus OM.  On previous admission in May 2023 Vascular Surgery recommended a BKA but patient was not amenable.  Patient continues to not be amenable to amputation.  He reports continuous pain to his b/l LE.    - Recommend continuing wound care, compression, and elevation.  - Bilateral lower extremities dressed with Kerlix and ACE wraps  - All other care per primary  - Vascular surgery will sign off.  Please reach out with any further questions.    Thank you for your consult. I will sign off. Please contact us if you have any additional questions.    Tyra Aguero DPM  Vascular Surgery  [facility] - Telemetry Stepdown

## 2024-03-07 NOTE — ASSESSMENT & PLAN NOTE
Chronic, uncontrolled. Latest blood pressure and vitals reviewed-     Temp:  [98.9 °F (37.2 °C)-99.8 °F (37.7 °C)]   Pulse:  []   Resp:  [16-19]   BP: (137-165)/(63-80)   SpO2:  [81 %-99 %] .   Home meds for hypertension were reviewed and noted below.   Hypertension Medications               furosemide (LASIX) 40 MG tablet Take 20 mg by mouth.    hydrALAZINE (APRESOLINE) 100 MG tablet Take 1 tablet (100 mg total) by mouth every 8 (eight) hours.    isosorbide dinitrate (ISORDIL) 10 MG tablet Take 1 tablet (10 mg total) by mouth 3 (three) times daily.    NIFEdipine (PROCARDIA-XL) 60 MG (OSM) 24 hr tablet Take 1 tablet (60 mg total) by mouth once daily.            While in the hospital, will manage blood pressure as follows; Adjust home antihypertensive regimen as follows- will keep hydralazine but hold valsartan and lasix    Will utilize p.r.n. blood pressure medication only if patient's blood pressure greater than 180/110 and he develops symptoms such as worsening chest pain or shortness of breath.

## 2024-03-07 NOTE — TELEPHONE ENCOUNTER
"Message received from home health:    "Hi.     I wanted to inform you both that we will be discharging Mr. Castañeda from [facility].  Out of the 22 scheduled SNV (3xwk) we have only been able to see him for 8 visits.  He has been extremely difficult to get in touch with and they often do not return our phone calls.  Our nurses have gone above and beyond in attempting to see Mr. Castañeda to no avail.  He was scheduled to see Dr. Graham Morales last Friday for wound care and was a no show.      Please let me know if you have any questions and thank you for your time.     Bhavya Wilson RN   [facility]   P. [phone] "  "

## 2024-03-07 NOTE — PLAN OF CARE
Problem: Physical Therapy  Goal: Physical Therapy Goal  Description: Goals to be met by: 24     Patient will increase functional independence with mobility by performin. Supine to sit with Moderate Assistance  2. Sit to supine with Moderate Assistance  3. Rolling to Left and Right with Moderate Assistance.  4. Sit to stand transfer with Maximum Assistance with LRAD  5. Bed to chair transfer with Moderate Assistance using LRAD  6. Wheelchair propulsion x100 feet with Stand-by Assistance using UE/LE  7. Sitting at edge of bed 10 minutes with Modified [place]  8. Lower extremity exercise program x30 reps per handout, with assistance as needed    Outcome: Ongoing, Progressing

## 2024-03-07 NOTE — SUBJECTIVE & OBJECTIVE
Interval History:   Podiatry following, left heel ulcer probe to bone with purulent drainage. This was sent for cultures. +GNR and proteus. ID will follow closely  No fever chills or night sweats  No acute complaints or concerns     Review of Systems   Constitutional:  Positive for fatigue. Negative for chills, diaphoresis and fever.   HENT:  Negative for congestion, ear pain, nosebleeds, rhinorrhea and sore throat.    Eyes:  Negative for pain.   Respiratory:  Negative for cough, shortness of breath and wheezing.    Cardiovascular:  Positive for leg swelling. Negative for chest pain.   Gastrointestinal:  Negative for abdominal pain, constipation, diarrhea, nausea and vomiting.   Genitourinary:  Negative for dysuria, frequency and urgency.   Musculoskeletal:  Positive for arthralgias, gait problem and myalgias. Negative for back pain and neck pain.   Skin:  Positive for color change and wound.   Neurological:  Negative for weakness, numbness and headaches.   Psychiatric/Behavioral:  Positive for dysphoric mood.      Objective:     Vital Signs (Most Recent):  Temp: 98.9 °F (37.2 °C) (03/07/24 1104)  Pulse: 72 (03/07/24 1240)  Resp: 19 (03/07/24 1104)  BP: (!) 159/73 (03/07/24 1104)  SpO2: (!) 92 % (03/07/24 1104) Vital Signs (24h Range):  Temp:  [98.9 °F (37.2 °C)-99.8 °F (37.7 °C)] 98.9 °F (37.2 °C)  Pulse:  [] 72  Resp:  [16-19] 19  SpO2:  [81 %-100 %] 92 %  BP: (149-165)/(66-80) 159/73     Weight: 120.2 kg (265 lb)  Body mass index is 39.13 kg/m².    Estimated Creatinine Clearance: 58.4 mL/min (based on SCr of 1.4 mg/dL).     Physical Exam  Vitals and nursing note reviewed.   Constitutional:       General: He is not in acute distress.     Appearance: He is well-developed. He is not diaphoretic.   HENT:      Head: Normocephalic and atraumatic.   Eyes:      Pupils: Pupils are equal, round, and reactive to light.   Cardiovascular:      Rate and Rhythm: Normal rate and regular rhythm.      Heart sounds: Normal  heart sounds. No murmur heard.     No friction rub. No gallop.   Pulmonary:      Effort: Pulmonary effort is normal. No respiratory distress.      Breath sounds: Normal breath sounds. No wheezing or rales.   Chest:      Chest wall: No tenderness.   Abdominal:      General: Bowel sounds are normal. There is no distension.      Palpations: There is no mass.      Tenderness: There is no abdominal tenderness. There is no guarding.   Musculoskeletal:         General: No deformity. Normal range of motion.      Cervical back: Normal range of motion and neck supple.   Skin:     General: Skin is warm and dry.      Findings: No erythema or rash.   Neurological:      Mental Status: He is alert and oriented to person, place, and time.   Psychiatric:         Behavior: Behavior normal.         Thought Content: Thought content normal.         Judgment: Judgment normal.                Significant Labs: All pertinent labs within the past 24 hours have been reviewed.    Significant Imaging: I have reviewed all pertinent imaging results/findings within the past 24 hours.

## 2024-03-07 NOTE — CARE UPDATE
"RAPID RESPONSE NURSE AI ALERT       AI alert received.    Chart Reviewed: 03/07/2024, 5:52 AM    MRN: [id number]  Bed: 8078/8078 A    Dx: DKA (diabetic ketoacidosis)    Saji Castañeda has a past medical history of Arthritis, Diabetes mellitus, Diabetes mellitus, type 2, Hyperlipidemia, Hypertension, Osteomyelitis, and Palliative care encounter.    Last VS: BP (!) 157/80   Pulse (!) 130   Temp 99.8 °F (37.7 °C) (Oral)   Resp 18   Ht 5' 9" (1.753 m)   Wt 120.2 kg (265 lb)   SpO2 (!) 93%   BMI 39.13 kg/m²     24H Vital Sign Range:  Temp:  [98.2 °F (36.8 °C)-99.8 °F (37.7 °C)]   Pulse:  []   Resp:  [15-27]   BP: (122-160)/(63-80)   SpO2:  [81 %-100 %]     Level of Consciousness (AVPU): alert    Recent Labs     03/05/24  0900 03/05/24  0915 03/06/24  0847   WBC 14.59*  --  10.65   HGB 9.2*  --  9.7*   HCT 31.0* 31* 32.4*   *  --  327       Recent Labs     03/05/24  0900 03/05/24  1223 03/06/24  0846 03/06/24  0847 03/06/24  1306 03/06/24  1624   *   < >  --  141  141 142 139   K 3.4*   < >  --  3.9  3.9 4.3 4.5   CL 92*   < >  --  105  105 105 107   CO2 26   < >  --  24  24 26 24   BUN 29*   < >  --  25*  25* 21 23   CREATININE 2.1*   < >  --  1.6*  1.6* 1.6* 1.5*   *   < >  --  173*  173* 189* 179*   PHOS 2.8  --  1.8*  --   --   --    MG 2.2  --  2.1  --   --   --     < > = values in this interval not displayed.        Recent Labs     03/05/24  0909   PH 7.392   PCO2 48.8*   PO2 20*   HCO3 29.7*   POCSATURATED 30   BE 5*        OXYGEN:             MEWS score: 1    bedside RNSofia  contacted. No concerns verbalized at this time. Instructed to call [phone] for further concerns or assistance.    Vishnu Blanchard RN       "

## 2024-03-07 NOTE — HPI
Saji Castañeda is a 75 yo M with a history of Afib, T2D, HLD, HTN, chronic OM of L heel, s/p L TMA, PID, ESBL Klebsiella and Acinetobacter bacteremia who was admitted after being found down at home and DKA.    Vascular Surgery was re-consulted to discuss option of an AKA due to LLE calcaneaus OM. During his previous admission in May 2023 Vascular surgery was consulted and recommended an AKA which he was not amendable to at that time. We were consulted on 3/7 for the same complaint and patient continued to not be amendable to amputation. Today patient was febrile and WBC 11.76. Patient is somnolent and weens in and out of sleep. Patient is non-ambulatory and mostly in wheelchair. Family is concerned if patient will be able to walk after amputation and asks if there are options for prosthetics. After discussing with the patient that amputation is the gold stand for infection source control the patient is now amendable for an AKA. Family members (Karen-niece, Kandy-sister) are bedside and also agree with continuing with amputation    Febrile, WBC 11.76, Glucose 183. US LE Venous studies shows DVT of the R brachial vein surrounding venous line only. L LE Xrays from 10/3/23 shows acute OM of the posterior aspect of the calcaneus.

## 2024-03-07 NOTE — PLAN OF CARE
Problem: Adult Inpatient Plan of Care  Goal: Plan of Care Review  Outcome: Ongoing, Progressing  Goal: Patient-Specific Goal (Individualized)  Outcome: Ongoing, Progressing  Goal: Absence of Hospital-Acquired Illness or Injury  Outcome: Ongoing, Progressing  Goal: Optimal Comfort and Wellbeing  Outcome: Ongoing, Progressing  Goal: Readiness for Transition of Care  Outcome: Ongoing, Progressing     Problem: Diabetic Ketoacidosis  Goal: Fluid and Electrolyte Balance with Absence of Ketosis  Outcome: Ongoing, Progressing     Problem: Diabetes Comorbidity  Goal: Blood Glucose Level Within Targeted Range  Outcome: Ongoing, Progressing     Problem: Adjustment to Illness (Sepsis/Septic Shock)  Goal: Optimal Coping  Outcome: Ongoing, Progressing     Problem: Bleeding (Sepsis/Septic Shock)  Goal: Absence of Bleeding  Outcome: Ongoing, Progressing     Problem: Glycemic Control Impaired (Sepsis/Septic Shock)  Goal: Blood Glucose Level Within Desired Range  Outcome: Ongoing, Progressing     Problem: Infection Progression (Sepsis/Septic Shock)  Goal: Absence of Infection Signs and Symptoms  Outcome: Ongoing, Progressing     Problem: Nutrition Impaired (Sepsis/Septic Shock)  Goal: Optimal Nutrition Intake  Outcome: Ongoing, Progressing     Problem: Infection  Goal: Absence of Infection Signs and Symptoms  Outcome: Ongoing, Progressing     Problem: Impaired Wound Healing  Goal: Optimal Wound Healing  Outcome: Ongoing, Progressing     Problem: Skin Injury Risk Increased  Goal: Skin Health and Integrity  Outcome: Ongoing, Progressing     Problem: Fall Injury Risk  Goal: Absence of Fall and Fall-Related Injury  Outcome: Ongoing, Progressing   Pt sitting up in bed talking with his family eating his dinner blood sugar monitored last reading 178 given scheduled insulin tolerated well continues to get wound care for wounds remains on contact isolation safety precautions in place.

## 2024-03-07 NOTE — ASSESSMENT & PLAN NOTE
Creatine stable for now. BMP reviewed- noted Estimated Creatinine Clearance: 48.1 mL/min (A) (based on SCr of 1.7 mg/dL (H)). according to latest data. Based on current GFR, CKD stage is stage 3 - GFR 30-59.  Monitor UOP and serial BMP and adjust therapy as needed. Renally dose meds. Avoid nephrotoxic medications and procedures.

## 2024-03-07 NOTE — CARE UPDATE
"RAPID RESPONSE NURSE CHART REVIEW        Chart Reviewed: 03/07/2024, 7:11 AM    MRN: [id number]  Bed: 8043/8078 A    Dx: DKA (diabetic ketoacidosis)    Saji Castañeda has a past medical history of Arthritis, Diabetes mellitus, Diabetes mellitus, type 2, Hyperlipidemia, Hypertension, Osteomyelitis, and Palliative care encounter.    Last VS: BP (!) 157/80   Pulse (!) 130   Temp 99.8 °F (37.7 °C) (Oral)   Resp 18   Ht 5' 9" (1.753 m)   Wt 120.2 kg (265 lb)   SpO2 (!) 93%   BMI 39.13 kg/m²     24H Vital Sign Range:  Temp:  [98.2 °F (36.8 °C)-99.8 °F (37.7 °C)]   Pulse:  []   Resp:  [15-27]   BP: (122-160)/(63-80)   SpO2:  [81 %-100 %]     Level of Consciousness (AVPU): alert    Recent Labs     03/05/24  0900 03/05/24  0915 03/06/24  0847   WBC 14.59*  --  10.65   HGB 9.2*  --  9.7*   HCT 31.0* 31* 32.4*   *  --  327       Recent Labs     03/05/24  0900 03/05/24  1223 03/06/24  0846 03/06/24  0847 03/06/24  1306 03/06/24  1624 03/07/24  0531   *   < >  --    < > 142 139 139  139   K 3.4*   < >  --    < > 4.3 4.5 4.2  4.2   CL 92*   < >  --    < > 105 107 108  108   CO2 26   < >  --    < > 26 24 23  23   BUN 29*   < >  --    < > 21 23 21  21   CREATININE 2.1*   < >  --    < > 1.6* 1.5* 1.4  1.4   *   < >  --    < > 189* 179* 165*  165*   PHOS 2.8  --  1.8*  --   --   --  2.0*   MG 2.2  --  2.1  --   --   --  2.0    < > = values in this interval not displayed.        Recent Labs     03/05/24  0909   PH 7.392   PCO2 48.8*   PO2 20*   HCO3 29.7*   POCSATURATED 30   BE 5*        OXYGEN:             MEWS score: 1    Rounding completed with charge RN ***. contacted for unvalidated vitals . Reports patient must have been restless but has returned to baseline. No additional concerns verbalized at this time. Instructed to call [phone] for further concerns or assistance.    Edwin Tanner RN       "

## 2024-03-07 NOTE — PT/OT/SLP PROGRESS
Physical Therapy Co-Treatment    Patient Name:  Saji Castañeda   MRN:  [id number]    Recommendations:     Discharge Recommendations: Moderate Intensity Therapy  Discharge Equipment Recommendations: slide board, wound care supplies, walker, rolling, bedside commode  Barriers to discharge: Decreased caregiver support and Increased skilled assistance    Assessment:   Co-evaluation/treatment performed due to patient's multiple deficits requiring two skilled therapists to appropriately and safely assess patient's strength, endurance, functional mobility, and ADL performance while facilitating functional tasks in addition to accommodating for patient's activity tolerance and medical acuity.    Saji Castañeda is a 74 y.o. male admitted with a medical diagnosis of Diabetic ketoacidosis without coma associated with type 2 diabetes mellitus.  He presents with the following impairments/functional limitations: impaired skin, pain, weakness, impaired endurance, impaired cardiopulmonary response to activity, impaired functional mobility, impaired balance, decreased lower extremity function, decreased upper extremity function, edema, decreased ROM. Patient demonstrating good motivation to participate in treatment session. Patient continues to demonstrate fairly good response to completed activities, barring patient's wound related pain. Specifically, patient's gluteal lesions are most impactful and limiting to his progress with functional mobility, furthering significant assistance required for bed mobility. WB tolerance assessed via scooting, while respecting new LLE NWB precautions, with patient requiring significant assistance for minimal lateral excursion. Patient's HR much improved this date, however BP readings showing continued hypertension. Patient continues to demonstrate that they will greatly benefit from moderate intensity skilled physical therapy services post-acutely.    Rehab Prognosis: Fair; patient would benefit from  "acute skilled PT services to address these deficits and reach maximum level of function.    Recent Surgery: * No surgery found *      Plan:     During this hospitalization, patient to be seen 4 x/week to address the identified rehab impairments via therapeutic activities, therapeutic exercises, neuromuscular re-education, wheelchair management/training and progress toward the following goals:    Plan of Care Expires:  04/06/24    Subjective     Chief Complaint: Gluteal pain 2/2 lesions  Patient/Family Comments/goals: To get better  Pain/Comfort:  Pain Rating 1: 9/10  Location - Side 1: Bilateral  Location - Orientation 1: lower  Location 1: leg  Pain Addressed 1: Reposition, Distraction  Pain Rating Post-Intervention 1: 9/10    Pain Rating 2: 9/10  Location - Side 2: Bilateral  Location - Orientation 2: lower  Location 2: gluteal  Pain Addressed 2: Reposition, Distraction  Pain Rating Post-Intervention 2: 9/10    Objective:     OT facilitated communication with RN prior to session.  Patient found supine with telemetry, pulse ox (continuous) upon PT entry to room.     General Precautions: Standard, fall, contact, diabetic   Orthopedic Precautions:LLE non weight bearing, RLE weight bearing as tolerated (RLE w/ Darco shoe)   Braces:  (Darco shoe)   Body mass index is 39.13 kg/m².  Oxygen Device: Room Air  Vitals: BP (!) 159/73 (BP Location: Right arm, Patient Position: Lying)   Pulse 72   Temp 98.9 °F (37.2 °C) (Oral)   Resp 19   Ht 5' 9" (1.753 m)   Wt 120.2 kg (265 lb)   SpO2 (!) 92%   BMI 39.13 kg/m²      Exams:  Skin Integrity:   Intact dressing present on B shanks/feet, starting just distal to knee joint  Open Wounds on lower glutes  Postural Assessment: slouched posture, rounded shoulders, and forward head  Physical Exam:   BLE ROM: Global AROM deficits (L>R)    LLE Strength (out of 5):   Hip Flexion:4-: Holds test position against SLIGHT to MODERATE pressure  Hip Abduction:4: Holds test positioning " against MODERATE pressure  Hip Adduction:4: Holds test positioning against MODERATE pressure  Knee Extension:4: Holds test positioning against MODERATE pressure  Knee Flexion:4-: Holds test position against SLIGHT to MODERATE pressure  Ankle Dorsiflexion: Not Assessed 2/2 [place] Amp  Ankle Plantarflexion:Not Assessed 2/2 [place] Amp    RLE Strength (out of 5):   Hip Flexion:4-: Holds test position against SLIGHT to MODERATE pressure  Hip Abduction:4: Holds test positioning against MODERATE pressure  Hip Adduction:4: Holds test positioning against MODERATE pressure  Knee Extension:4: Holds test positioning against MODERATE pressure  Knee Flexion:4-: Holds test position against SLIGHT to MODERATE pressure  Ankle Dorsiflexion:2+: Moves through Partial AROM *Limited 2/2 wrap  Ankle Plantarflexion:2+: Moves through Partial AROM Limited 2/2 wrap    Functional Mobility:  Bed Mobility:   EOB Scooting: Pt endorsing pain, w/ corresponding significant pain behaviors observed. Increased time required for all tasks 2/2 pain related to lesions. Assistance required at trunk & BLEs. Cuing and facilitation required for breakdown of complex motor task into single steps   Anterior: TotalAx2  Lateral (R): TotalAx2. Minimal hip clearance & overall excursion.  Boosting: TotalAx2 with HOB Flat and in trendelenburg position  Supine>Sit: x1, TotalAx2 with HOB Elevated. Pt w/ significant assistance w/ trunk management.    Sit>Supine: x1, TotalAx2 with HOB Flat    Transfers:     Sit<>Stand: Deferred 2/2 safety    Balance:   Static Sitting: CGA-SBA Cues required for midline positioning. Some instances of corrective facilitation required  Reactive Sitting: ModA (LE strength assessment)  Dynamic Sitting: TotalAx2    AM-PAC 6 CLICK MOBILITY  Turning over in bed (including adjusting bedclothes, sheets and blankets)?: 2  Sitting down on and standing up from a chair with arms (e.g., wheelchair, bedside commode, etc.): 1  Moving from lying on back  to sitting on the side of the bed?: 2  Moving to and from a bed to a chair (including a wheelchair)?: 1  Need to walk in hospital room?: 1  Climbing 3-5 steps with a railing?: 1  Basic Mobility Total Score: 8     Treatment & Education:  Increased time spent with patient to perform formal strength assessment  Increased time spent with patient due to patient's pain with mobility  Increased time required for consistent monitoring of VS throughout completed activities, see below:  Pre-activity Resting (HOB elevated): 169/71mmHg (MAP: 102) ; HR: 94bpm  Sitting EOB: 146/77mmHg (MAP: 99); HR: Peaked at 111bpm  Post-activity Resting (HOB elevated): 165/74mmHg (MAP: 106) ; HR: 98bpm    Patient Education Provided on:  The role of physical therapy and how the patient can benefit from skilled services  The negative effects of prolonged bed rest/sedentary behavior, along with the importance of OOB activity & patient participation with PT  The importance of contacting RN, via call light, for mobility throughout the day  Pt white board updated with current therapists name and level of mobility assistance needed.     Patient Verbalized and Demonstrated understanding of all topics touched on this date. All patient questions answered within the PT scope of practice    Patient left supine with all lines intact, call button in reach, and RNs present.    GOALS:   Multidisciplinary Problems       Physical Therapy Goals          Problem: Physical Therapy    Goal Priority Disciplines Outcome Goal Variances Interventions   Physical Therapy Goal     PT, PT/OT Ongoing, Progressing     Description: Goals to be met by: 24     Patient will increase functional independence with mobility by performin. Supine to sit with Moderate Assistance  2. Sit to supine with Moderate Assistance  3. Rolling to Left and Right with Moderate Assistance.  4. Sit to stand transfer with Maximum Assistance with LRAD  5. Bed to chair transfer with Moderate  Assistance using LRAD  6. Wheelchair propulsion x100 feet with Stand-by Assistance using UE/LE  7. Sitting at edge of bed 10 minutes with Modified [place]  8. Lower extremity exercise program x30 reps per handout, with assistance as needed                         Time Tracking:     PT Received On: 03/07/24  PT Start Time: 1009     PT Stop Time: 1053  PT Total Time (min): 44 min     Billable Minutes: Therapeutic Activity 44    Treatment Type: Treatment  PT/PTA: PT     Number of PTA visits since last PT visit: 0     03/07/2024

## 2024-03-07 NOTE — NURSING
Pt vehemently refused his oxygen connection via nasal canula as his  Spo2 fluctuated between 80's and 90's. Pt was a bit  aggressive.    The medical team was notified.

## 2024-03-07 NOTE — ASSESSMENT & PLAN NOTE
Vascular Surgery consulted for consideration of BKA vs AKA due to LLE calcaneus OM.  On previous admission in May 2023 Vascular Surgery recommended a BKA but patient was not amenable.  Patient continues to not be amenable to amputation.  He reports continuous pain to his b/l LE.    - Recommend continuing wound care, compression, and elevation  - Bilateral lower extremities dressed with Kerlix and ACE wraps  - All other care per primary  - Vascular surgery will sign off.  Please reach out with any further questions.

## 2024-03-07 NOTE — PT/OT/SLP PROGRESS
Occupational Therapy  Co- Treatment    Name: Saji Castañeda  MRN: [id number]  Admitting Diagnosis:  Diabetic ketoacidosis without coma associated with type 2 diabetes mellitus       Recommendations:     Discharge Recommendations: Moderate Intensity Therapy  Discharge Equipment Recommendations:  slide board, wound care supplies, walker, rolling, bedside commode  Barriers to discharge:  Other (Comment) (requires increased assist)    Assessment:     Saji Castañeda is a 74 y.o. male with a medical diagnosis of Diabetic ketoacidosis without coma associated with type 2 diabetes mellitus.  He presents with significant pain noted with mobility due to multiple wounds . Pt. HR increased to 111 during session, BP also noted to be elevated. Pt. Requires extensive assist for all mobility. Pt. NWB on LLE and WBAT on RLE in Darco shoe. Performance deficits affecting function are weakness, impaired endurance, impaired self care skills, impaired functional mobility, impaired balance, decreased lower extremity function, decreased ROM, decreased upper extremity function, pain, impaired skin, edema, impaired coordination, impaired cardiopulmonary response to activity, orthopedic precautions.     Rehab Prognosis:  Fair; patient would benefit from acute skilled OT services to address these deficits and reach maximum level of function.       Plan:     Patient to be seen 4 x/week to address the above listed problems via self-care/home management, therapeutic activities, therapeutic exercises, neuromuscular re-education  Plan of Care Expires: 04/06/24  Plan of Care Reviewed with: patient    Subjective     Chief Complaint: Pt. Reporting increased pain  Patient/Family Comments/goals: to get better  Pain/Comfort:  Pain Rating 1: 9/10  Location 1: sacral spine  Pain Addressed 1: Reposition, Distraction  Pain Rating Post-Intervention 1: 9/10    Objective:     Communicated with: nurse prior to session.  Patient found supine with telemetry, pulse ox  (continuous) upon OT entry to room. Pt. With ace wraps on BLE on this date    General Precautions: Standard, contact, fall, diabetic    Orthopedic Precautions:LLE non weight bearing, RLE weight bearing as tolerated (RLE in Darco shoe)  Braces:  (darco shoe)  Respiratory Status: Nasal cannula, flow 1 L/min     Occupational Performance:     Bed Mobility:    Patient completed Scooting/Bridging with total assistance and 2 persons  Patient completed Supine to Sit with total assistance and 2 persons  Patient completed Sit to Supine with total assistance and 2 persons   Rolling bilaterally : Total A to place fresh linen    Functional Mobility/Transfers:  Scoot along EOB Total A x 2     Activities of Daily Living:  Grooming: stand by assistance seated EOB to brush teeth and wipe mouth  Dependent  for toileting and LBD to don socks      [facility] 6 Click ADL: 10    Treatment & Education:  Pt. Educated on need to reposition in bed to prevent bed sores with staff assist  Pt. Educated on wear of positioning boots in bed to protect heels  Therapist requested nursing to order wedge and positioning boots to assist with positioning  BP in supine at rest: 169/71  BP seated EOB: 146/77  BP when returned supine: 165/74    Patient left right sidelying with all lines intact, call button in reach, bed alarm on, and nurse notified    GOALS:   Multidisciplinary Problems       Occupational Therapy Goals          Problem: Occupational Therapy    Goal Priority Disciplines Outcome Interventions   Occupational Therapy Goal     OT, PT/OT Ongoing, Progressing    Description: Goals to be met by: 4/6/24     Patient will increase functional independence with ADLs by performing:    UE Dressing with Contact Guard Assistance.  LE Dressing with Maximum Assistance.  Grooming while EOB with Stand-by Assistance.  Toileting from bedside commode with Maximum Assistance for hygiene and clothing management.   Sitting at edge of bed x5 minutes with Stand-by  Assistance.  Rolling to Bilateral with Minimal Assistance.   Supine to sit with Minimal Assistance.  Stand pivot transfers with Minimal Assistance.  Toilet transfer to bedside commode with Maximum Assistance.  BUE strengthening exercise program 2x day with theraband/HEP worksheet                         Time Tracking:     OT Date of Treatment: 03/07/24  OT Start Time: 1009  OT Stop Time: 1053  OT Total Time (min): 44 min    Billable Minutes:Self Care/Home Management 44    OT/KATHLEEN: OT          3/7/2024

## 2024-03-07 NOTE — ASSESSMENT & PLAN NOTE
Patient's FSGs are uncontrolled due to hyperglycemia on current medication regimen.  Last A1c reviewed-   Lab Results   Component Value Date    HGBA1C >14.0 (H) 03/05/2024     Most recent fingerstick glucose reviewed-   Recent Labs   Lab 03/06/24  2135 03/07/24  0756   POCTGLUCOSE 233* 241*       Current correctional scale   Insulin drip  Maintain anti-hyperglycemic dose as follows-   Antihyperglycemics (From admission, onward)    Start     Stop Route Frequency Ordered    03/07/24 1130  insulin aspart U-100 pen 10 Units         -- SubQ 3 times daily with meals 03/07/24 1028    03/05/24 1800  insulin detemir U-100 (Levemir) pen 30 Units         -- SubQ Nightly 03/05/24 1713        Hold Oral hypoglycemics while patient is in the hospital.  Will keep patient on DKA pathway with insulin drip and fluid with protocol in place for switching to subcutaneous insulin as anion gap closes.      DKA  HHS  - DKA/HHS Pathway initiated  - Blood sugar on arrival 677 with a bicarb 29.7, anion gap 14, BHB 1.4 and pH 7.3  - HbA1c 14  - Likely induced by lack of medication and/or infection  - Home DM regimen:  Insulin and Glipizide  - Start insulin gtt per protocol with q1h accuchecks while on the drip.    - Once Bicarb >18, Anion gap <10, Glucose <200 on 2 readings and able to tolerate PO intake without nausea and vomiting, transition to subq insulin with a 1-2 h overlap with drip.    Long acting insulin dose:  Detemir (0.25mg/kg) daily or in 2 doses  Short acting insulin dose:  Aspart (0.08mg/kg) units per meal  - Start normal saline at 125cc/hr.  Once blood sugar is 200, change IVF to D5 1/2 NS at 50cc/h  - Monitor electrolytes with BMP q4h while on insulin gtt and place on tele  - Bariatric clear liquid diet while on insulin gtt then change to Diabetic diet when initiating subq insulin with accuchecks 4x daily before meals and at bedtime (AC and HS)  - Endocrine consult

## 2024-03-07 NOTE — CARE UPDATE
RAPID RESPONSE NURSE ROUND       Rounding completed with charge RN, Kristen. No additional concerns verbalized at this time. Instructed to call [phone] for further concerns or assistance.

## 2024-03-08 LAB
ALBUMIN SERPL BCP-MCNC: 1.6 G/DL (ref 3.5–5.2)
ALP SERPL-CCNC: 59 U/L (ref 55–135)
ALT SERPL W/O P-5'-P-CCNC: 25 U/L (ref 10–44)
ANION GAP SERPL CALC-SCNC: 11 MMOL/L (ref 8–16)
ANION GAP SERPL CALC-SCNC: 11 MMOL/L (ref 8–16)
ANION GAP SERPL CALC-SCNC: 9 MMOL/L (ref 8–16)
AST SERPL-CCNC: 62 U/L (ref 10–40)
BACTERIA SPEC AEROBE CULT: ABNORMAL
BACTERIA SPEC AEROBE CULT: ABNORMAL
BASOPHILS # BLD AUTO: 0.01 K/UL (ref 0–0.2)
BASOPHILS # BLD AUTO: 0.01 K/UL (ref 0–0.2)
BASOPHILS NFR BLD: 0.1 % (ref 0–1.9)
BASOPHILS NFR BLD: 0.1 % (ref 0–1.9)
BILIRUB SERPL-MCNC: 0.3 MG/DL (ref 0.1–1)
BUN SERPL-MCNC: 19 MG/DL (ref 8–23)
BUN SERPL-MCNC: 19 MG/DL (ref 8–23)
BUN SERPL-MCNC: 20 MG/DL (ref 8–23)
CALCIUM SERPL-MCNC: 8.5 MG/DL (ref 8.7–10.5)
CHLORIDE SERPL-SCNC: 107 MMOL/L (ref 95–110)
CHLORIDE SERPL-SCNC: 108 MMOL/L (ref 95–110)
CHLORIDE SERPL-SCNC: 108 MMOL/L (ref 95–110)
CO2 SERPL-SCNC: 23 MMOL/L (ref 23–29)
CO2 SERPL-SCNC: 23 MMOL/L (ref 23–29)
CO2 SERPL-SCNC: 26 MMOL/L (ref 23–29)
CREAT SERPL-MCNC: 1.4 MG/DL (ref 0.5–1.4)
DIFFERENTIAL METHOD BLD: ABNORMAL
DIFFERENTIAL METHOD BLD: ABNORMAL
EOSINOPHIL # BLD AUTO: 0.3 K/UL (ref 0–0.5)
EOSINOPHIL # BLD AUTO: 0.3 K/UL (ref 0–0.5)
EOSINOPHIL NFR BLD: 2.8 % (ref 0–8)
EOSINOPHIL NFR BLD: 2.8 % (ref 0–8)
ERYTHROCYTE [DISTWIDTH] IN BLOOD BY AUTOMATED COUNT: 17 % (ref 11.5–14.5)
ERYTHROCYTE [DISTWIDTH] IN BLOOD BY AUTOMATED COUNT: 17 % (ref 11.5–14.5)
EST. GFR  (NO RACE VARIABLE): 52.4 ML/MIN/1.73 M^2
GLUCOSE SERPL-MCNC: 105 MG/DL (ref 70–110)
GLUCOSE SERPL-MCNC: 89 MG/DL (ref 70–110)
GLUCOSE SERPL-MCNC: 89 MG/DL (ref 70–110)
HCT VFR BLD AUTO: 28.5 % (ref 40–54)
HCT VFR BLD AUTO: 28.5 % (ref 40–54)
HGB BLD-MCNC: 8.5 G/DL (ref 14–18)
HGB BLD-MCNC: 8.5 G/DL (ref 14–18)
IMM GRANULOCYTES # BLD AUTO: 0.05 K/UL (ref 0–0.04)
IMM GRANULOCYTES # BLD AUTO: 0.05 K/UL (ref 0–0.04)
IMM GRANULOCYTES NFR BLD AUTO: 0.5 % (ref 0–0.5)
IMM GRANULOCYTES NFR BLD AUTO: 0.5 % (ref 0–0.5)
LYMPHOCYTES # BLD AUTO: 0.8 K/UL (ref 1–4.8)
LYMPHOCYTES # BLD AUTO: 0.8 K/UL (ref 1–4.8)
LYMPHOCYTES NFR BLD: 7.8 % (ref 18–48)
LYMPHOCYTES NFR BLD: 7.8 % (ref 18–48)
MAGNESIUM SERPL-MCNC: 2 MG/DL (ref 1.6–2.6)
MCH RBC QN AUTO: 24.7 PG (ref 27–31)
MCH RBC QN AUTO: 24.7 PG (ref 27–31)
MCHC RBC AUTO-ENTMCNC: 29.8 G/DL (ref 32–36)
MCHC RBC AUTO-ENTMCNC: 29.8 G/DL (ref 32–36)
MCV RBC AUTO: 83 FL (ref 82–98)
MCV RBC AUTO: 83 FL (ref 82–98)
MONOCYTES # BLD AUTO: 0.6 K/UL (ref 0.3–1)
MONOCYTES # BLD AUTO: 0.6 K/UL (ref 0.3–1)
MONOCYTES NFR BLD: 5.8 % (ref 4–15)
MONOCYTES NFR BLD: 5.8 % (ref 4–15)
NEUTROPHILS # BLD AUTO: 8.6 K/UL (ref 1.8–7.7)
NEUTROPHILS # BLD AUTO: 8.6 K/UL (ref 1.8–7.7)
NEUTROPHILS NFR BLD: 83 % (ref 38–73)
NEUTROPHILS NFR BLD: 83 % (ref 38–73)
NRBC BLD-RTO: 0 /100 WBC
NRBC BLD-RTO: 0 /100 WBC
PHOSPHATE SERPL-MCNC: 3.4 MG/DL (ref 2.7–4.5)
PLATELET # BLD AUTO: 390 K/UL (ref 150–450)
PLATELET # BLD AUTO: 390 K/UL (ref 150–450)
PMV BLD AUTO: 9.8 FL (ref 9.2–12.9)
PMV BLD AUTO: 9.8 FL (ref 9.2–12.9)
POCT GLUCOSE: 116 MG/DL (ref 70–110)
POCT GLUCOSE: 175 MG/DL (ref 70–110)
POCT GLUCOSE: 185 MG/DL (ref 70–110)
POCT GLUCOSE: 86 MG/DL (ref 70–110)
POTASSIUM SERPL-SCNC: 3.7 MMOL/L (ref 3.5–5.1)
POTASSIUM SERPL-SCNC: 4.1 MMOL/L (ref 3.5–5.1)
POTASSIUM SERPL-SCNC: 4.1 MMOL/L (ref 3.5–5.1)
PROT SERPL-MCNC: 6.4 G/DL (ref 6–8.4)
RBC # BLD AUTO: 3.44 M/UL (ref 4.6–6.2)
RBC # BLD AUTO: 3.44 M/UL (ref 4.6–6.2)
SODIUM SERPL-SCNC: 142 MMOL/L (ref 136–145)
WBC # BLD AUTO: 10.34 K/UL (ref 3.9–12.7)
WBC # BLD AUTO: 10.34 K/UL (ref 3.9–12.7)

## 2024-03-08 PROCEDURE — 85025 COMPLETE CBC W/AUTO DIFF WBC: CPT

## 2024-03-08 PROCEDURE — 25000003 PHARM REV CODE 250

## 2024-03-08 PROCEDURE — 80048 BASIC METABOLIC PNL TOTAL CA: CPT | Mod: XB

## 2024-03-08 PROCEDURE — 84100 ASSAY OF PHOSPHORUS: CPT

## 2024-03-08 PROCEDURE — 99233 SBSQ HOSP IP/OBS HIGH 50: CPT | Mod: ,,, | Performed by: PODIATRIST

## 2024-03-08 PROCEDURE — 99233 SBSQ HOSP IP/OBS HIGH 50: CPT | Mod: GC,,, | Performed by: INTERNAL MEDICINE

## 2024-03-08 PROCEDURE — 63600175 PHARM REV CODE 636 W HCPCS: Performed by: PHYSICIAN ASSISTANT

## 2024-03-08 PROCEDURE — A4216 STERILE WATER/SALINE, 10 ML: HCPCS

## 2024-03-08 PROCEDURE — 80053 COMPREHEN METABOLIC PANEL: CPT

## 2024-03-08 PROCEDURE — 99233 SBSQ HOSP IP/OBS HIGH 50: CPT | Mod: ,,, | Performed by: PHYSICIAN ASSISTANT

## 2024-03-08 PROCEDURE — 27000207 HC ISOLATION

## 2024-03-08 PROCEDURE — 36415 COLL VENOUS BLD VENIPUNCTURE: CPT

## 2024-03-08 PROCEDURE — 83735 ASSAY OF MAGNESIUM: CPT

## 2024-03-08 PROCEDURE — 25000003 PHARM REV CODE 250: Performed by: INTERNAL MEDICINE

## 2024-03-08 PROCEDURE — 20600001 HC STEP DOWN PRIVATE ROOM

## 2024-03-08 PROCEDURE — 25000003 PHARM REV CODE 250: Performed by: PHYSICIAN ASSISTANT

## 2024-03-08 RX ORDER — GLUCAGON 1 MG
1 KIT INJECTION
Status: DISCONTINUED | OUTPATIENT
Start: 2024-03-08 | End: 2024-03-29

## 2024-03-08 RX ORDER — IBUPROFEN 200 MG
24 TABLET ORAL
Status: DISCONTINUED | OUTPATIENT
Start: 2024-03-08 | End: 2024-03-29

## 2024-03-08 RX ORDER — PERMETHRIN 50 MG/G
CREAM TOPICAL ONCE
Status: COMPLETED | OUTPATIENT
Start: 2024-03-08 | End: 2024-03-09

## 2024-03-08 RX ORDER — IBUPROFEN 200 MG
16 TABLET ORAL
Status: DISCONTINUED | OUTPATIENT
Start: 2024-03-08 | End: 2024-03-29

## 2024-03-08 RX ADMIN — TAMSULOSIN HYDROCHLORIDE 0.4 MG: 0.4 CAPSULE ORAL at 09:03

## 2024-03-08 RX ADMIN — Medication 10 ML: at 05:03

## 2024-03-08 RX ADMIN — CLOPIDOGREL BISULFATE 75 MG: 75 TABLET ORAL at 09:03

## 2024-03-08 RX ADMIN — NIFEDIPINE 90 MG: 30 TABLET, FILM COATED, EXTENDED RELEASE ORAL at 09:03

## 2024-03-08 RX ADMIN — ACETAMINOPHEN 650 MG: 325 TABLET ORAL at 05:03

## 2024-03-08 RX ADMIN — APIXABAN 5 MG: 5 TABLET, FILM COATED ORAL at 09:03

## 2024-03-08 RX ADMIN — ATORVASTATIN CALCIUM 40 MG: 40 TABLET, FILM COATED ORAL at 09:03

## 2024-03-08 RX ADMIN — Medication 10 ML: at 12:03

## 2024-03-08 RX ADMIN — ACETAMINOPHEN 650 MG: 325 TABLET ORAL at 09:03

## 2024-03-08 RX ADMIN — Medication 10 ML: at 07:03

## 2024-03-08 RX ADMIN — GABAPENTIN 300 MG: 300 CAPSULE ORAL at 09:03

## 2024-03-08 RX ADMIN — INSULIN DETEMIR 30 UNITS: 100 INJECTION, SOLUTION SUBCUTANEOUS at 09:03

## 2024-03-08 RX ADMIN — INSULIN ASPART 10 UNITS: 100 INJECTION, SOLUTION INTRAVENOUS; SUBCUTANEOUS at 07:03

## 2024-03-08 RX ADMIN — INSULIN ASPART 10 UNITS: 100 INJECTION, SOLUTION INTRAVENOUS; SUBCUTANEOUS at 12:03

## 2024-03-08 RX ADMIN — ERTAPENEM 1 G: 1 INJECTION INTRAMUSCULAR; INTRAVENOUS at 12:03

## 2024-03-08 RX ADMIN — AMOXICILLIN AND CLAVULANATE POTASSIUM 1 TABLET: 875; 125 TABLET, FILM COATED ORAL at 09:03

## 2024-03-08 RX ADMIN — PANTOPRAZOLE SODIUM 40 MG: 40 TABLET, DELAYED RELEASE ORAL at 09:03

## 2024-03-08 NOTE — PLAN OF CARE
03/08/24 1106   Post-Acute Status   Post-Acute Authorization Placement   Post-Acute Placement Status Referrals Sent   Discharge Delays None known at this time   Discharge Plan   Discharge Plan A Skilled Nursing Facility   Discharge Plan B New Nursing Home placement - residential care facility;Home Health     Met with patient to review discharge recommendation of SNF and is agreeable to plan    Patient provided list of facilities in-network with patient's payor plan. Providers that are owned, operated, or affiliated with [facility] are included on the list.     Notified that referral sent to below listed facilities from in-network list based on proximity to home/family support:   [facility]  2. [facility]  3. [facility]      Patient/family instructed to identify preference.    Preferred Facility: (if more than 1, listed in order of descending preference)  [facility]    If an additional preferred facility not listed above is identified, additional referral to be sent. If above facilities unable to accept, will send additional referrals to in-network providers.       SW sent referrals via careport upon patients wishes and MD team recs. SW following.     AMAYA Green  [facility]  [phone]

## 2024-03-08 NOTE — ASSESSMENT & PLAN NOTE
Chronic, uncontrolled. Latest blood pressure and vitals reviewed-     Temp:  [98.3 °F (36.8 °C)-99.5 °F (37.5 °C)]   Pulse:  []   Resp:  [17-19]   BP: (120-143)/(60-71)   SpO2:  [90 %-98 %] .   Home meds for hypertension were reviewed and noted below.   Hypertension Medications               furosemide (LASIX) 40 MG tablet Take 20 mg by mouth.    hydrALAZINE (APRESOLINE) 100 MG tablet Take 1 tablet (100 mg total) by mouth every 8 (eight) hours.    isosorbide dinitrate (ISORDIL) 10 MG tablet Take 1 tablet (10 mg total) by mouth 3 (three) times daily.    NIFEdipine (PROCARDIA-XL) 60 MG (OSM) 24 hr tablet Take 1 tablet (60 mg total) by mouth once daily.            While in the hospital, will manage blood pressure as follows; Adjust home antihypertensive regimen as follows- will keep hydralazine but hold valsartan and lasix    Will utilize p.r.n. blood pressure medication only if patient's blood pressure greater than 180/110 and he develops symptoms such as worsening chest pain or shortness of breath.

## 2024-03-08 NOTE — ASSESSMENT & PLAN NOTE
Saji Castañeda is a 74 y.o. male who is status post left lower extremity toe part amputation.  Now presenting for acute infection, with acute on chronic osteomyelitis of the left calcaneus.  Wound is malodorous and exuding purulence.  Left proximal leg with weeping purulence noted as well.  Arterial ultrasound shows no hemodynamically significant stenosis.  Left MRI shows osteomyelitis.  At this time there is no more proximal amputation that Podiatry can offer.      - Discussed with patient that we recommend more proximal amp as there is limited treatment from Podiatry perspective. Given lengthy discussion of risks patient now possibly amendable to BKA/AKA. Patient would like to discuss with sister about plan.  - Vascular surgery was consulted who recommended BKA/AKA. Patient not amendable.  - L heel wound +proteus mirabilis and enterobacter. Abx per ID  - LLE dressed with Hydrafera blue, Kirlex, and ACE wrap  - NWB, patient to use wheelchair  - All other care per primary  - Podiatry will continue to follow

## 2024-03-08 NOTE — PLAN OF CARE
After reading over patients notes on today 3.8.24, SW decided it was in the patients best interest and safety to call in a report to [employer] for possible neglect for the following reasons.   Per MD and nurse notes on 3.5.24, Patient found down in home with a blood sugar reading of 677. Patient admitted to EMS personal that he had not eaten or taken any medications for at least a week. Patient also found with multiple chronic wounds and bed bugs. PT/OT reports on today patient is very stiff, unable to move without max assist, unable to do for himself.    SW called EPS at 10:28AM and had to leave voicemail.   Ms. Snyder with EPS called SW back at 10:38AM [phone]. Phone call lasted 11 minutes.     AMAYA Green  [facility]  [phone]

## 2024-03-08 NOTE — PROGRESS NOTES
[facility] - Telemetry Stepdown  Podiatry  Progress Note    Patient Name: Saji Castañeda  MRN: [id number]  Admission Date: 3/5/2024  Hospital Length of Stay: 3 days  Attending Physician: Seymour Cullen MD  Primary Care Provider: [facility] -     Subjective:     Interval History: NAEON. VSS. Sitting up in bed watchin TV. Denies pain to her b/l LE. Denies f/c/n/v. Denies any new pedal complaints.     Scheduled Meds:   apixaban  5 mg Oral BID    atorvastatin  40 mg Oral Daily    clopidogreL  75 mg Oral Daily    ertapenem (INVanz) IV (PEDS and ADULTS)  1 g Intravenous Q24H    gabapentin  300 mg Oral BID    insulin aspart U-100  10 Units Subcutaneous TIDWM    insulin detemir U-100  30 Units Subcutaneous QHS    NIFEdipine  90 mg Oral Daily    pantoprazole  40 mg Oral Daily    sodium chloride 0.9%  10 mL Intravenous Q6H    tamsulosin  0.4 mg Oral Daily     Continuous Infusions:  PRN Meds:acetaminophen, aluminum-magnesium hydroxide-simethicone, dextrose 10%, dextrose 10%, glucagon (human recombinant), glucose, glucose, hydrALAZINE, melatonin, ondansetron, sodium chloride 0.9%, Flushing PICC/Midline Protocol **AND** sodium chloride 0.9% **AND** sodium chloride 0.9%, triamcinolone acetonide 0.025%    Review of Systems   Constitutional:  Negative for fatigue and fever.   HENT: Negative.     Eyes: Negative.    Respiratory: Negative.     Cardiovascular: Negative.    Gastrointestinal: Negative.    Endocrine: Negative.    Genitourinary: Negative.    Skin:  Positive for wound.        Reports wounds to LLE   Allergic/Immunologic: Negative.    Hematological: Negative.    Psychiatric/Behavioral: Negative.       Objective:     Vital Signs (Most Recent):  Temp: 99.4 °F (37.4 °C) (03/08/24 1541)  Pulse: 96 (03/08/24 1541)  Resp: 16 (03/08/24 1541)  BP: (!) 132/58 (03/08/24 1541)  SpO2: 97 % (03/08/24 1541) Vital Signs (24h Range):  Temp:  [98.3 °F (36.8 °C)-99.5 °F (37.5 °C)] 99.4 °F (37.4 °C)  Pulse:  [] 96  Resp:  [16-19]  16  SpO2:  [90 %-98 %] 97 %  BP: (120-143)/(58-71) 132/58     Weight: 120.2 kg (265 lb)  Body mass index is 39.13 kg/m².    Foot Exam     General  Orientation: unable to assess         Right Foot/Ankle   Inspection and Palpation  Skin Exam: dry skin, fissure, skin changes, abnormal color and ulcer;      Neurovascular  Dorsalis pedis: absent  Posterior tibial: absent  Saphenous nerve sensation: diminished  Tibial nerve sensation: diminished  Superficial peroneal nerve sensation: diminished  Deep peroneal nerve sensation: diminished  Sural nerve sensation: diminished     Comments  Right anterior ankle wound:  Wound is superficial, with well adhered epithelialized tissue.  No surrounding erythema or edema noted.     Diffuse xerotic, abrasions, wounds, hemosiderin deposits and dry flaky skin noted.     Left Foot/Ankle    Inspection and Palpation  Skin Exam: dry skin, skin changes, abnormal color, ulcer and erythema;      Neurovascular  Dorsalis pedis: absent  Posterior tibial: absent  Saphenous nerve sensation: diminished  Tibial nerve sensation: diminished  Superficial peroneal nerve sensation: diminished  Deep peroneal nerve sensation: diminished  Sural nerve sensation: diminished     Comments  Status post Chopart amputation:  Now with full-thickness wound on the posterior aspect.  Wound is malodorous, probes deep to bone.  Granular base with macerated borders.  Undermining noted throughout plantar aspect.     Left medial leg wound:  Irregular borders and widespread with weeping purulence noted.  Malodorous.  Surrounding erythema noted.  Edema noted.  Diffuse eschar and fibrous tissue noted.     Diffuse xerotic, abrasions, wounds, hemosiderin deposits    Laboratory:  A1C:   Recent Labs   Lab 09/13/23  0616 03/05/24  0900   HGBA1C 11.0* >14.0*     BMP:   Recent Labs   Lab 03/08/24  0748         K 3.7      CO2 26   BUN 20   CREATININE 1.4   CALCIUM 8.5*   MG 2.0     CBC:   Recent Labs   Lab  03/08/24  0748   WBC 10.34  10.34   RBC 3.44*  3.44*   HGB 8.5*  8.5*   HCT 28.5*  28.5*     390   MCV 83  83   MCH 24.7*  24.7*   MCHC 29.8*  29.8*     Diagnostic Results:  I have reviewed all pertinent imaging results/findings within the past 24 hours.    Clinical Findings:  Left foot and leg        Assessment/Plan:     ID  * Osteomyelitis of left lower extremity  Saji Castañeda is a 74 y.o. male who is status post left lower extremity toe part amputation.  Now presenting for acute infection, with acute on chronic osteomyelitis of the left calcaneus.  Wound is malodorous and exuding purulence.  Left proximal leg with weeping purulence noted as well.  Arterial ultrasound shows no hemodynamically significant stenosis.  Left MRI shows osteomyelitis.  At this time there is no more proximal amputation that Podiatry can offer.      - Discussed with patient that we recommend more proximal amp as there is limited treatment from Podiatry perspective. Given lengthy discussion of risks patient now possibly amendable to BKA/AKA. Patient would like to discuss with sister about plan.  - Vascular surgery was consulted who recommended BKA/AKA. Patient not amendable.  - Recommend continuing with wound care  - L heel wound +proteus mirabilis and enterobacter. Abx per ID  - LLE dressed with Hydrafera blue, Kirlex, and ACE wrap  - NWB, patient to use wheelchair  - All other care per primary  - Podiatry will continue to follow    Tyra Aguero DPM  Podiatry  [facility] - Telemetry Stepdown

## 2024-03-08 NOTE — ASSESSMENT & PLAN NOTE
Creatine stable for now. BMP reviewed- noted Estimated Creatinine Clearance: 58.4 mL/min (based on SCr of 1.4 mg/dL). according to latest data. Based on current GFR, CKD stage is stage 3 - GFR 30-59.  Monitor UOP and serial BMP and adjust therapy as needed. Renally dose meds. Avoid nephrotoxic medications and procedures.

## 2024-03-08 NOTE — HOSPITAL COURSE
Wound care and cultures collected by Podiatry with recs for Vascular consult for amputation. Patient declined amputation. ID recommending IV Ertapenem for chronic osteomyelitis for 6 week duration (EED: 4/15/24). With high wound care needs and IV antibiotics plan patient needing long-term placement. He is being treated with topical permethrin for bedbugs. Wound care has been dressing the wounds. 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 Still adjusting insulin to control sugars better. Giving fluids for YNES. Poor PO intake even with feeding assistance. LTAC authorization denied by [employer]. Waiting for SNF placement ([facility], awaiting auth and family to complete paperwork). Patient started spiking fever again and infectious work up done. ID re-consulted. Patient has been counseled multiple times that source control cannot be achieved without amputation. Patient agreeable to AKA scheduled for 3/27. Transitioned to heparin from eliquis. Fung placed for urinary rentention. Discussed new hematuria with urology, hematuria resolved with repositioning of fung. Pt to receive 1u PRBC preoperatively per vascular surgery. S/p L AKA on 3/27 with vascular surgery. ID rec 24-48h post-op abx now that source control is achieved, unless further concerned for infection. Delirium precautions + trazodone added for sleep hygiene, rescheduled lab times appropriately. Resuming heparin drip + plavix 3/29 per vascular surg. 3/30 transitioned to eliquis from heparin gtt. LUE swelling noted 3/31, repeat DVT US of upper extremities were negative for DVT's. Hgb downtrending at 6.8, will receive 1u PRBC, no overt signs of bleeding. Febrile on 4/1 overnight to 100.6, possibly atelectasis or HAP for which he received 1 dose ertapenem. Blood cx collected, CXR showing possible opacity in mid right lung. Started on vanc/cefepime for HAP. EKG overnight 4/1-4/2 c/f complete AVB. Discussed with  electrophysiologists, pt has a leadless pacemaker implanted, no acute intervention indicated at this time. Cardiac device check showed functional pacemaker set to operate if HR reaches 40. Pt stable for discharge pending placement.

## 2024-03-08 NOTE — ASSESSMENT & PLAN NOTE
This patient does have evidence of infective focus  My overall impression is sepsis.  Source: Skin and Soft Tissue (location left leg)  Antibiotics given-   Antibiotics (72h ago, onward)      Start     Stop Route Frequency Ordered    03/08/24 1230  ertapenem (INVANZ) 1 g in sodium chloride 0.9 % 100 mL IVPB (MB+)         -- IV Every 24 hours (non-standard times) 03/08/24 1115          Latest lactate reviewed-  Recent Labs   Lab 03/06/24  0847   LACTATE 2.2       Organ dysfunction indicated by Acute kidney injury    Fluid challenge Actual Body weight- Patient will receive 30ml/kg actual body weight to calculate fluid bolus for treatment of septic shock.     Post- resuscitation assessment No - Post resuscitation assessment not needed       Will Not start Pressors-     - See osteo

## 2024-03-08 NOTE — SUBJECTIVE & OBJECTIVE
Subjective:     Interval History: NAEON. VSS. Sitting up in bed watchin TV. Denies pain to her b/l LE. Denies f/c/n/v. Denies any new pedal complaints.     Scheduled Meds:   apixaban  5 mg Oral BID    atorvastatin  40 mg Oral Daily    clopidogreL  75 mg Oral Daily    ertapenem (INVanz) IV (PEDS and ADULTS)  1 g Intravenous Q24H    gabapentin  300 mg Oral BID    insulin aspart U-100  10 Units Subcutaneous TIDWM    insulin detemir U-100  30 Units Subcutaneous QHS    NIFEdipine  90 mg Oral Daily    pantoprazole  40 mg Oral Daily    sodium chloride 0.9%  10 mL Intravenous Q6H    tamsulosin  0.4 mg Oral Daily     Continuous Infusions:  PRN Meds:acetaminophen, aluminum-magnesium hydroxide-simethicone, dextrose 10%, dextrose 10%, glucagon (human recombinant), glucose, glucose, hydrALAZINE, melatonin, ondansetron, sodium chloride 0.9%, Flushing PICC/Midline Protocol **AND** sodium chloride 0.9% **AND** sodium chloride 0.9%, triamcinolone acetonide 0.025%    Review of Systems   Constitutional:  Negative for fatigue and fever.   HENT: Negative.     Eyes: Negative.    Respiratory: Negative.     Cardiovascular: Negative.    Gastrointestinal: Negative.    Endocrine: Negative.    Genitourinary: Negative.    Skin:  Positive for wound.        Reports wounds to LLE   Allergic/Immunologic: Negative.    Hematological: Negative.    Psychiatric/Behavioral: Negative.       Objective:     Vital Signs (Most Recent):  Temp: 99.4 °F (37.4 °C) (03/08/24 1541)  Pulse: 96 (03/08/24 1541)  Resp: 16 (03/08/24 1541)  BP: (!) 132/58 (03/08/24 1541)  SpO2: 97 % (03/08/24 1541) Vital Signs (24h Range):  Temp:  [98.3 °F (36.8 °C)-99.5 °F (37.5 °C)] 99.4 °F (37.4 °C)  Pulse:  [] 96  Resp:  [16-19] 16  SpO2:  [90 %-98 %] 97 %  BP: (120-143)/(58-71) 132/58     Weight: 120.2 kg (265 lb)  Body mass index is 39.13 kg/m².    Foot Exam     General  Orientation: unable to assess         Right Foot/Ankle   Inspection and Palpation  Skin Exam: dry skin,  fissure, skin changes, abnormal color and ulcer;      Neurovascular  Dorsalis pedis: absent  Posterior tibial: absent  Saphenous nerve sensation: diminished  Tibial nerve sensation: diminished  Superficial peroneal nerve sensation: diminished  Deep peroneal nerve sensation: diminished  Sural nerve sensation: diminished     Comments  Right anterior ankle wound:  Wound is superficial, with well adhered epithelialized tissue.  No surrounding erythema or edema noted.     Diffuse xerotic, abrasions, wounds, hemosiderin deposits and dry flaky skin noted.     Left Foot/Ankle    Inspection and Palpation  Skin Exam: dry skin, skin changes, abnormal color, ulcer and erythema;      Neurovascular  Dorsalis pedis: absent  Posterior tibial: absent  Saphenous nerve sensation: diminished  Tibial nerve sensation: diminished  Superficial peroneal nerve sensation: diminished  Deep peroneal nerve sensation: diminished  Sural nerve sensation: diminished     Comments  Status post Chopart amputation:  Now with full-thickness wound on the posterior aspect.  Wound is malodorous, probes deep to bone.  Granular base with macerated borders.  Undermining noted throughout plantar aspect.     Left medial leg wound:  Irregular borders and widespread with weeping purulence noted.  Malodorous.  Surrounding erythema noted.  Edema noted.  Diffuse eschar and fibrous tissue noted.     Diffuse xerotic, abrasions, wounds, hemosiderin deposits    Laboratory:  A1C:   Recent Labs   Lab 09/13/23  0616 03/05/24  0900   HGBA1C 11.0* >14.0*     BMP:   Recent Labs   Lab 03/08/24  0748         K 3.7      CO2 26   BUN 20   CREATININE 1.4   CALCIUM 8.5*   MG 2.0     CBC:   Recent Labs   Lab 03/08/24  0748   WBC 10.34  10.34   RBC 3.44*  3.44*   HGB 8.5*  8.5*   HCT 28.5*  28.5*     390   MCV 83  83   MCH 24.7*  24.7*   MCHC 29.8*  29.8*     Diagnostic Results:  I have reviewed all pertinent imaging results/findings within the past  24 hours.    Clinical Findings:  Left foot and leg

## 2024-03-08 NOTE — ASSESSMENT & PLAN NOTE
Patient with Proteus on leg cultures collected by Podiatry with concern for acute on chronic osteo.    - IV ertapenem with end date 4/15/24   - midline catheters placed on admission for difficult IV access

## 2024-03-08 NOTE — PROGRESS NOTES
[facility] - Telemetry [facility] Medicine  Progress Note    Patient Name: Saji Castañeda  MRN: [id number]  Patient Class: IP- Inpatient   Admission Date: 3/5/2024  Length of Stay: 3 days  Attending Physician: Seymour Cullen MD  Primary Care Provider: [facility] -        Subjective:     Principal Problem:Osteomyelitis of left lower extremity        HPI:  Saji Castañeda is a 74 y.o. male with a medical history of DM2, HLD, HTN, chronic osteomyelitis of L heel, L TMA, PAD, hx of ESBL klebsiella, acinetobacter bacteremia, presenting with hyperglycemia. He presented to the ED via EMS and his POCT glucose on arrival was >500. Serum blood glucose 667 with anion gap of 17 and elevated ketones. Serum potassium 3.4, corrected sodium 149. Urinalysis significant for RBCs, glucosuria, and moderate bacteria and yeast. CBC revealed leukocytosis, elevated platelets, and mild anemia. CMP shows Na 135, K 3.4, BUN 29, Cr 2.1, Glucose 677, Lactate 4.61. BNP and troponin elevated. GFR 32.2. Insulin drip, IV fluids, zosyn, vancomycin, and potassium given.      He is unable to provide much history, but states that he has not been taking his home medications for at least a week. He reports shortness of breath, fatigue, and weakness for the last 6-7 days. He has chills and reports episodes of emesis. He denies abdominal pain, chest pain, palpitations, recent illness/infection, fevers, changes to bowel movements and urinary output. Patient lives with his brother and sister at home. He was last seen in the ED on 2/21 after a fall. He was hypoglycemic BGL 60 at that time which returned to normal after eating. He was also scheduled for a wound clinic appointment today 3/5 at [facility] with Dr. Wilson.        Overview/Hospital Course:  Wound care and cultures collected by Podiatry with recs for Vascular consult for amputation. Patient declined amputation. ID recommending IV Ertapenem for chronic osteomyelitis for 6 week duration  (EED: 4/15/24). With high wound care needs and IV antibiotics plan patient needing long-term placement.     Interval History: NAEON, patient feeling somewhat improved.    Review of Systems   Constitutional:  Negative for activity change, appetite change and fever.   Respiratory:  Negative for cough, shortness of breath and wheezing.    Cardiovascular:  Negative for chest pain and leg swelling.   Gastrointestinal:  Negative for abdominal pain, constipation, diarrhea, nausea and vomiting.   Musculoskeletal:  Positive for arthralgias and myalgias.   Skin:  Negative for rash.   Neurological:  Negative for headaches.     Objective:     Vital Signs (Most Recent):  Temp: 99.5 °F (37.5 °C) (03/08/24 1127)  Pulse: 101 (03/08/24 1159)  Resp: 17 (03/08/24 1127)  BP: (!) 143/60 (03/08/24 1127)  SpO2: (!) 90 % (03/08/24 1127) Vital Signs (24h Range):  Temp:  [98.3 °F (36.8 °C)-99.5 °F (37.5 °C)] 99.5 °F (37.5 °C)  Pulse:  [] 101  Resp:  [17-19] 17  SpO2:  [90 %-98 %] 90 %  BP: (120-143)/(60-71) 143/60     Weight: 120.2 kg (265 lb)  Body mass index is 39.13 kg/m².    Intake/Output Summary (Last 24 hours) at 3/8/2024 1519  Last data filed at 3/7/2024 1630  Gross per 24 hour   Intake 120 ml   Output --   Net 120 ml         Physical Exam  Vitals and nursing note reviewed.   Constitutional:       Appearance: He is obese.   HENT:      Head: Normocephalic and atraumatic.   Eyes:      Conjunctiva/sclera: Conjunctivae normal.      Pupils: Pupils are equal, round, and reactive to light.   Cardiovascular:      Rate and Rhythm: Normal rate and regular rhythm.      Pulses: Normal pulses.   Pulmonary:      Effort: Pulmonary effort is normal. No respiratory distress.      Breath sounds: No wheezing or rales.   Abdominal:      Palpations: Abdomen is soft.      Tenderness: There is no abdominal tenderness. There is no guarding.   Musculoskeletal:      Comments: LLE wounds with dressing C/D/I   Skin:     General: Skin is warm and dry.    Neurological:      General: No focal deficit present.      Mental Status: He is alert and oriented to person, place, and time.   Psychiatric:         Mood and Affect: Mood normal.             Significant Labs: All pertinent labs within the past 24 hours have been reviewed.    Significant Imaging: I have reviewed all pertinent imaging results/findings within the past 24 hours.    Assessment/Plan:      * Osteomyelitis of left lower extremity  Patient with Proteus on leg cultures collected by Podiatry with concern for acute on chronic osteo.    - IV ertapenem with end date 4/15/24   - midline catheters placed on admission for difficult IV access      Chronic anticoagulation  - See DVT      History of amputation of left high mid foot   See osteo      Stage 3b chronic kidney disease  Creatine stable for now. BMP reviewed- noted Estimated Creatinine Clearance: 58.4 mL/min (based on SCr of 1.4 mg/dL). according to latest data. Based on current GFR, CKD stage is stage 3 - GFR 30-59.  Monitor UOP and serial BMP and adjust therapy as needed. Renally dose meds. Avoid nephrotoxic medications and procedures.    Severe sepsis  This patient does have evidence of infective focus  My overall impression is sepsis.  Source: Skin and Soft Tissue (location left leg)  Antibiotics given-   Antibiotics (72h ago, onward)      Start     Stop Route Frequency Ordered    03/08/24 1230  ertapenem (INVANZ) 1 g in sodium chloride 0.9 % 100 mL IVPB (MB+)         -- IV Every 24 hours (non-standard times) 03/08/24 1115          Latest lactate reviewed-  Recent Labs   Lab 03/06/24  0847   LACTATE 2.2       Organ dysfunction indicated by Acute kidney injury    Fluid challenge Actual Body weight- Patient will receive 30ml/kg actual body weight to calculate fluid bolus for treatment of septic shock.     Post- resuscitation assessment No - Post resuscitation assessment not needed       Will Not start Pressors-     - See osteo      Other  hyperlipidemia  Continue home atorvastatin      Diabetic ketoacidosis without coma associated with type 2 diabetes mellitus  Patient's FSGs are uncontrolled due to hyperglycemia on current medication regimen.  Last A1c reviewed-   Lab Results   Component Value Date    HGBA1C >14.0 (H) 03/05/2024     Most recent fingerstick glucose reviewed-   Recent Labs   Lab 03/07/24  1609 03/07/24  2110 03/08/24  1202 03/08/24  1232   POCTGLUCOSE 178* 170* 185* 175*       Current correctional scale   Insulin drip  Maintain anti-hyperglycemic dose as follows-   Antihyperglycemics (From admission, onward)      Start     Stop Route Frequency Ordered    03/07/24 1130  insulin aspart U-100 pen 10 Units         -- SubQ 3 times daily with meals 03/07/24 1028    03/05/24 1800  insulin detemir U-100 (Levemir) pen 30 Units         -- SubQ Nightly 03/05/24 1713          Hold Oral hypoglycemics while patient is in the hospital.  Will keep patient on DKA pathway with insulin drip and fluid with protocol in place for switching to subcutaneous insulin as anion gap closes.      DKA  HHS  - Resolved  - Continue insulin regimen and BG checks TIDWM and QHS      Paroxysmal atrial fibrillation  Patient with Persistent (7 days or more) atrial fibrillation which is uncontrolled currently with    . Patient is currently in sinus rhythm.[id number] Score: 4. . Anticoagulation indicated. Anticoagulation done with lovenox as we could not get IV access for heparin  .    -Restart plavix and apixaban    Chronic deep vein thrombosis (DVT) of right upper extremity  - Continue home AC      Cellulitis of left lower leg  See osteo      Peripheral arterial disease  Resume plavix      Essential hypertension  Chronic, uncontrolled. Latest blood pressure and vitals reviewed-     Temp:  [98.3 °F (36.8 °C)-99.5 °F (37.5 °C)]   Pulse:  []   Resp:  [17-19]   BP: (120-143)/(60-71)   SpO2:  [90 %-98 %] .   Home meds for hypertension were reviewed and noted below.    Hypertension Medications               furosemide (LASIX) 40 MG tablet Take 20 mg by mouth.    hydrALAZINE (APRESOLINE) 100 MG tablet Take 1 tablet (100 mg total) by mouth every 8 (eight) hours.    isosorbide dinitrate (ISORDIL) 10 MG tablet Take 1 tablet (10 mg total) by mouth 3 (three) times daily.    NIFEdipine (PROCARDIA-XL) 60 MG (OSM) 24 hr tablet Take 1 tablet (60 mg total) by mouth once daily.            While in the hospital, will manage blood pressure as follows; Adjust home antihypertensive regimen as follows- will keep hydralazine but hold valsartan and lasix    Will utilize p.r.n. blood pressure medication only if patient's blood pressure greater than 180/110 and he develops symptoms such as worsening chest pain or shortness of breath.      VTE Risk Mitigation (From admission, onward)           Ordered     apixaban tablet 5 mg  2 times daily         03/07/24 1810                    Discharge Planning   OXANA: 3/11/2024     Code Status: Full Code   Is the patient medically ready for discharge?: No    Reason for patient still in hospital (select all that apply): Treatment and Pending disposition  Discharge Plan A: Skilled Nursing Facility   Discharge Delays: None known at this time              Gera Calderon MD  Department of Hospital Medicine   [facility] - Telemetry Stepdown

## 2024-03-08 NOTE — PLAN OF CARE
Problem: Adult Inpatient Plan of Care  Goal: Plan of Care Review  Outcome: Ongoing, Progressing  Goal: Patient-Specific Goal (Individualized)  Outcome: Ongoing, Progressing  Goal: Absence of Hospital-Acquired Illness or Injury  Outcome: Ongoing, Progressing  Goal: Optimal Comfort and Wellbeing  Outcome: Ongoing, Progressing  Goal: Readiness for Transition of Care  Outcome: Ongoing, Progressing     Problem: Diabetic Ketoacidosis  Goal: Fluid and Electrolyte Balance with Absence of Ketosis  Outcome: Ongoing, Progressing     Problem: Diabetes Comorbidity  Goal: Blood Glucose Level Within Targeted Range  Outcome: Ongoing, Progressing     Problem: Adjustment to Illness (Sepsis/Septic Shock)  Goal: Optimal Coping  Outcome: Ongoing, Progressing     Problem: Glycemic Control Impaired (Sepsis/Septic Shock)  Goal: Blood Glucose Level Within Desired Range  Outcome: Ongoing, Progressing     Problem: Infection Progression (Sepsis/Septic Shock)  Goal: Absence of Infection Signs and Symptoms  Outcome: Ongoing, Progressing     Problem: Nutrition Impaired (Sepsis/Septic Shock)  Goal: Optimal Nutrition Intake  Outcome: Ongoing, Progressing     Problem: Infection  Goal: Absence of Infection Signs and Symptoms  Outcome: Ongoing, Progressing     Problem: Impaired Wound Healing  Goal: Optimal Wound Healing  Outcome: Ongoing, Progressing     Problem: Skin Injury Risk Increased  Goal: Skin Health and Integrity  Outcome: Ongoing, Progressing     Problem: Fall Injury Risk  Goal: Absence of Fall and Fall-Related Injury  Outcome: Ongoing, Progressing    Patient remains free from falls and injury. NADN. VSS. Safety maintained; bed low and locked, call light in reach.  No complaint of pain, n/v, diarrhea, or SOB. Questions encouraged and answered. Plan of care reviewed with patient. Will continue to monitor, will continue with plan of care.

## 2024-03-08 NOTE — PROGRESS NOTES
[facility] - Telemetry Stepdown  Infectious Disease  Progress Note    Patient Name: Saji Castañeda  MRN: [id number]  Admission Date: 3/5/2024  Length of Stay: 3 days  Attending Physician: Seymour Cullen MD  Primary Care Provider: [facility] -    Isolation Status: Contact  Assessment/Plan:      ID  Cellulitis of left lower leg  73 y/o male with a.fib, DMII uncontrolled, HLD, HTN, chronic heel ulcerations c/b chronic osteomyelitis, hx of  Acinetobacter and ESBL klebsiella admitted for DKA. ID consulted for chronic wounds/ulcerations and abx guidance as with hx of MDROs. Remained afebrile, no leukocytosis. Blood cultures remain NGTD.       Recommendations  D/c augmentin. Start ertapenem 1g daily. Per podiatry, cultures obtained from left heel. Wounds probe to bone. +proteus and enterobacter  Anticipate 6 weeks of IV abx therapy for acute on chronic osteomyelitis. Pt refusing amputation. If worsening progression would revisit discussion of amputation.   Continue local wound care  Discussed with primary team. Agree with snf/ltac placement for wound care, IV abx.   If goes to ltac/snf please consult ID for EOC/OPAT f/u  Blood culture remain NGTD.     Outpatient Antibiotic Therapy Plan:    Please send referral to [facility].    1) Infection: osteomyelitis left heel    2) Discharge Antibiotics:    Intravenous antibiotics:  Ertapenem 1g daily      3) Therapy Duration:  6 weeks    Estimated end date of IV antibiotics: 4/15/24    4) Outpatient Weekly Labs:    Order the following labs to be drawn on Mondays:   CBC  CMP   CRP        5) Fax Lab Results to Infectious Diseases Provider: ulises sandy    [facility] Fax Number: [phone]    6) Outpatient Infectious Diseases Follow-up    Follow-up appointment will be arranged by the ID clinic and will be found in the patient's appointments tab.    Prior to discharge, please ensure the patient's follow-up has been scheduled.    If  there is still no follow-up scheduled prior to discharge, please send an EPIC message to Jennifer Sapp in Infectious Diseases.              Thank you for your consult. I will sign off. Please contact us if you have any additional questions.    Raheem White PA-C  Infectious Disease  [facility] - Telemetry Stepdown    Subjective:     Principal Problem:Diabetic ketoacidosis without coma associated with type 2 diabetes mellitus    HPI: 75 y/o male with a.fib, DMII uncontrolled, HLD, HTN, chronic heel ulcerations c/b chronic osteomyelitis, hx of  Acinetobacter and ESBL klebsiella admitted for DKA. ID consulted for chronic wounds/ulcerations and abx guidance as with hx of MDROs. Remained afebrile, no leukocytosis. Pt was given dose of meropenem. Blood cultures remain NGTD.   Interval History:   Podiatry following, left heel ulcer probe to bone with purulent drainage. This was sent for cultures. +Proteus and Enterobacter. On augmenting. Switched to ertapenem today. Good candidate for ltac or SNF for wound care and IV abx.    No fever chills or night sweats  No acute complaints or concerns     Review of Systems   Constitutional:  Negative for chills, diaphoresis, fatigue and fever.   HENT:  Negative for congestion, ear pain, nosebleeds, rhinorrhea and sore throat.    Eyes:  Negative for pain.   Respiratory:  Negative for cough, shortness of breath and wheezing.    Cardiovascular:  Negative for chest pain and leg swelling.   Gastrointestinal:  Negative for abdominal pain, constipation, diarrhea, nausea and vomiting.   Genitourinary:  Negative for dysuria, frequency and urgency.   Musculoskeletal:  Positive for gait problem and myalgias. Negative for arthralgias, back pain and neck pain.   Skin:  Positive for color change and wound.   Neurological:  Negative for weakness, numbness and headaches.   Psychiatric/Behavioral:  Positive for dysphoric mood.      Objective:     Vital Signs (Most Recent):  Temp: 99.5 °F (37.5  °C) (03/08/24 0801)  Pulse: 103 (03/08/24 0801)  Resp: 19 (03/08/24 0801)  BP: 138/61 (03/08/24 0801)  SpO2: (!) 92 % (03/08/24 0801) Vital Signs (24h Range):  Temp:  [98.3 °F (36.8 °C)-99.5 °F (37.5 °C)] 99.5 °F (37.5 °C)  Pulse:  [] 103  Resp:  [18-19] 19  SpO2:  [92 %-98 %] 92 %  BP: (120-138)/(61-71) 138/61     Weight: 120.2 kg (265 lb)  Body mass index is 39.13 kg/m².    Estimated Creatinine Clearance: 58.4 mL/min (based on SCr of 1.4 mg/dL).     Physical Exam  Vitals and nursing note reviewed.   Constitutional:       General: He is not in acute distress.     Appearance: He is well-developed. He is not diaphoretic.   HENT:      Head: Normocephalic and atraumatic.   Eyes:      Pupils: Pupils are equal, round, and reactive to light.   Cardiovascular:      Rate and Rhythm: Normal rate and regular rhythm.      Heart sounds: Normal heart sounds. No murmur heard.     No friction rub. No gallop.   Pulmonary:      Effort: Pulmonary effort is normal. No respiratory distress.      Breath sounds: Normal breath sounds. No wheezing or rales.   Chest:      Chest wall: No tenderness.   Abdominal:      General: Bowel sounds are normal. There is no distension.      Palpations: There is no mass.      Tenderness: There is no abdominal tenderness. There is no guarding.   Musculoskeletal:         General: No deformity. Normal range of motion.      Cervical back: Normal range of motion and neck supple.   Skin:     General: Skin is warm and dry.      Findings: No erythema or rash.   Neurological:      Mental Status: He is alert and oriented to person, place, and time.   Psychiatric:         Behavior: Behavior normal.         Thought Content: Thought content normal.         Judgment: Judgment normal.                Significant Labs: All pertinent labs within the past 24 hours have been reviewed.    Significant Imaging: I have reviewed all pertinent imaging results/findings within the past 24 hours.

## 2024-03-08 NOTE — SUBJECTIVE & OBJECTIVE
Interval History:   Podiatry following, left heel ulcer probe to bone with purulent drainage. This was sent for cultures. +Proteus and Enterobacter. On augmenting. Switched to ertapenem today. Good candidate for ltac or SNF for wound care and IV abx.    No fever chills or night sweats  No acute complaints or concerns     Review of Systems   Constitutional:  Negative for chills, diaphoresis, fatigue and fever.   HENT:  Negative for congestion, ear pain, nosebleeds, rhinorrhea and sore throat.    Eyes:  Negative for pain.   Respiratory:  Negative for cough, shortness of breath and wheezing.    Cardiovascular:  Negative for chest pain and leg swelling.   Gastrointestinal:  Negative for abdominal pain, constipation, diarrhea, nausea and vomiting.   Genitourinary:  Negative for dysuria, frequency and urgency.   Musculoskeletal:  Positive for gait problem and myalgias. Negative for arthralgias, back pain and neck pain.   Skin:  Positive for color change and wound.   Neurological:  Negative for weakness, numbness and headaches.   Psychiatric/Behavioral:  Positive for dysphoric mood.      Objective:     Vital Signs (Most Recent):  Temp: 99.5 °F (37.5 °C) (03/08/24 0801)  Pulse: 103 (03/08/24 0801)  Resp: 19 (03/08/24 0801)  BP: 138/61 (03/08/24 0801)  SpO2: (!) 92 % (03/08/24 0801) Vital Signs (24h Range):  Temp:  [98.3 °F (36.8 °C)-99.5 °F (37.5 °C)] 99.5 °F (37.5 °C)  Pulse:  [] 103  Resp:  [18-19] 19  SpO2:  [92 %-98 %] 92 %  BP: (120-138)/(61-71) 138/61     Weight: 120.2 kg (265 lb)  Body mass index is 39.13 kg/m².    Estimated Creatinine Clearance: 58.4 mL/min (based on SCr of 1.4 mg/dL).     Physical Exam  Vitals and nursing note reviewed.   Constitutional:       General: He is not in acute distress.     Appearance: He is well-developed. He is not diaphoretic.   HENT:      Head: Normocephalic and atraumatic.   Eyes:      Pupils: Pupils are equal, round, and reactive to light.   Cardiovascular:      Rate and  Rhythm: Normal rate and regular rhythm.      Heart sounds: Normal heart sounds. No murmur heard.     No friction rub. No gallop.   Pulmonary:      Effort: Pulmonary effort is normal. No respiratory distress.      Breath sounds: Normal breath sounds. No wheezing or rales.   Chest:      Chest wall: No tenderness.   Abdominal:      General: Bowel sounds are normal. There is no distension.      Palpations: There is no mass.      Tenderness: There is no abdominal tenderness. There is no guarding.   Musculoskeletal:         General: No deformity. Normal range of motion.      Cervical back: Normal range of motion and neck supple.   Skin:     General: Skin is warm and dry.      Findings: No erythema or rash.   Neurological:      Mental Status: He is alert and oriented to person, place, and time.   Psychiatric:         Behavior: Behavior normal.         Thought Content: Thought content normal.         Judgment: Judgment normal.                Significant Labs: All pertinent labs within the past 24 hours have been reviewed.    Significant Imaging: I have reviewed all pertinent imaging results/findings within the past 24 hours.

## 2024-03-08 NOTE — SUBJECTIVE & OBJECTIVE
Interval History: NAEON, patient feeling somewhat improved.    Review of Systems   Constitutional:  Negative for activity change, appetite change and fever.   Respiratory:  Negative for cough, shortness of breath and wheezing.    Cardiovascular:  Negative for chest pain and leg swelling.   Gastrointestinal:  Negative for abdominal pain, constipation, diarrhea, nausea and vomiting.   Musculoskeletal:  Positive for arthralgias and myalgias.   Skin:  Negative for rash.   Neurological:  Negative for headaches.     Objective:     Vital Signs (Most Recent):  Temp: 99.5 °F (37.5 °C) (03/08/24 1127)  Pulse: 101 (03/08/24 1159)  Resp: 17 (03/08/24 1127)  BP: (!) 143/60 (03/08/24 1127)  SpO2: (!) 90 % (03/08/24 1127) Vital Signs (24h Range):  Temp:  [98.3 °F (36.8 °C)-99.5 °F (37.5 °C)] 99.5 °F (37.5 °C)  Pulse:  [] 101  Resp:  [17-19] 17  SpO2:  [90 %-98 %] 90 %  BP: (120-143)/(60-71) 143/60     Weight: 120.2 kg (265 lb)  Body mass index is 39.13 kg/m².    Intake/Output Summary (Last 24 hours) at 3/8/2024 1519  Last data filed at 3/7/2024 1630  Gross per 24 hour   Intake 120 ml   Output --   Net 120 ml         Physical Exam  Vitals and nursing note reviewed.   Constitutional:       Appearance: He is obese.   HENT:      Head: Normocephalic and atraumatic.   Eyes:      Conjunctiva/sclera: Conjunctivae normal.      Pupils: Pupils are equal, round, and reactive to light.   Cardiovascular:      Rate and Rhythm: Normal rate and regular rhythm.      Pulses: Normal pulses.   Pulmonary:      Effort: Pulmonary effort is normal. No respiratory distress.      Breath sounds: No wheezing or rales.   Abdominal:      Palpations: Abdomen is soft.      Tenderness: There is no abdominal tenderness. There is no guarding.   Musculoskeletal:      Comments: LLE wounds with dressing C/D/I   Skin:     General: Skin is warm and dry.   Neurological:      General: No focal deficit present.      Mental Status: He is alert and oriented to person,  place, and time.   Psychiatric:         Mood and Affect: Mood normal.             Significant Labs: All pertinent labs within the past 24 hours have been reviewed.    Significant Imaging: I have reviewed all pertinent imaging results/findings within the past 24 hours.

## 2024-03-08 NOTE — ASSESSMENT & PLAN NOTE
Patient with Persistent (7 days or more) atrial fibrillation which is uncontrolled currently with    . Patient is currently in sinus rhythm.[id number] Score: 4. . Anticoagulation indicated. Anticoagulation done with lovenox as we could not get IV access for heparin  .    -Restart plavix and apixaban

## 2024-03-08 NOTE — ASSESSMENT & PLAN NOTE
75 y/o male with a.fib, DMII uncontrolled, HLD, HTN, chronic heel ulcerations c/b chronic osteomyelitis, hx of  Acinetobacter and ESBL klebsiella admitted for DKA. ID consulted for chronic wounds/ulcerations and abx guidance as with hx of MDROs. Remained afebrile, no leukocytosis. Blood cultures remain NGTD.       Recommendations  D/c augmentin. Start ertapenem 1g daily. Per podiatry, cultures obtained from left heel. Wounds probe to bone. +proteus and enterobacter  Anticipate 6 weeks of IV abx therapy for acute on chronic osteomyelitis. Pt refusing amputation. If worsening progression would revisit discussion of amputation.   Continue local wound care  Discussed with primary team. Agree with snf/ltac placement for wound care, IV abx.   If goes to ltac/snf please consult ID for EOC/OPAT f/u  Blood culture remain NGTD.     Outpatient Antibiotic Therapy Plan:    Please send referral to [facility].    1) Infection: osteomyelitis left heel    2) Discharge Antibiotics:    Intravenous antibiotics:  Ertapenem 1g daily      3) Therapy Duration:  6 weeks    Estimated end date of IV antibiotics: 4/15/24    4) Outpatient Weekly Labs:    Order the following labs to be drawn on Mondays:   CBC  CMP   CRP        5) Fax Lab Results to Infectious Diseases Provider: ulises sandy    [facility] Fax Number: [phone]    6) Outpatient Infectious Diseases Follow-up    Follow-up appointment will be arranged by the ID clinic and will be found in the patient's appointments tab.    Prior to discharge, please ensure the patient's follow-up has been scheduled.    If there is still no follow-up scheduled prior to discharge, please send an EPIC message to Jennifer Sapp in Infectious Diseases.

## 2024-03-08 NOTE — ASSESSMENT & PLAN NOTE
Patient's FSGs are uncontrolled due to hyperglycemia on current medication regimen.  Last A1c reviewed-   Lab Results   Component Value Date    HGBA1C >14.0 (H) 03/05/2024     Most recent fingerstick glucose reviewed-   Recent Labs   Lab 03/07/24  1609 03/07/24  2110 03/08/24  1202 03/08/24  1232   POCTGLUCOSE 178* 170* 185* 175*       Current correctional scale   Insulin drip  Maintain anti-hyperglycemic dose as follows-   Antihyperglycemics (From admission, onward)      Start     Stop Route Frequency Ordered    03/07/24 1130  insulin aspart U-100 pen 10 Units         -- SubQ 3 times daily with meals 03/07/24 1028    03/05/24 1800  insulin detemir U-100 (Levemir) pen 30 Units         -- SubQ Nightly 03/05/24 1713          Hold Oral hypoglycemics while patient is in the hospital.  Will keep patient on DKA pathway with insulin drip and fluid with protocol in place for switching to subcutaneous insulin as anion gap closes.      DKA  HHS  - Resolved  - Continue insulin regimen and BG checks TIDWM and QHS

## 2024-03-09 PROBLEM — T14.8XXA BEDBUG BITE WITH INFECTION: Status: ACTIVE | Noted: 2024-03-09

## 2024-03-09 PROBLEM — L08.9 BEDBUG BITE WITH INFECTION: Status: ACTIVE | Noted: 2024-03-09

## 2024-03-09 PROBLEM — A49.8 PROTEUS INFECTION: Status: ACTIVE | Noted: 2024-03-09

## 2024-03-09 LAB
ALBUMIN SERPL BCP-MCNC: 1.7 G/DL (ref 3.5–5.2)
ALP SERPL-CCNC: 67 U/L (ref 55–135)
ALT SERPL W/O P-5'-P-CCNC: 21 U/L (ref 10–44)
ANION GAP SERPL CALC-SCNC: 9 MMOL/L (ref 8–16)
AST SERPL-CCNC: 41 U/L (ref 10–40)
BASOPHILS # BLD AUTO: 0.02 K/UL (ref 0–0.2)
BASOPHILS NFR BLD: 0.2 % (ref 0–1.9)
BILIRUB SERPL-MCNC: 0.3 MG/DL (ref 0.1–1)
BUN SERPL-MCNC: 18 MG/DL (ref 8–23)
CALCIUM SERPL-MCNC: 8.4 MG/DL (ref 8.7–10.5)
CHLORIDE SERPL-SCNC: 105 MMOL/L (ref 95–110)
CO2 SERPL-SCNC: 28 MMOL/L (ref 23–29)
CREAT SERPL-MCNC: 1.3 MG/DL (ref 0.5–1.4)
DIFFERENTIAL METHOD BLD: ABNORMAL
EOSINOPHIL # BLD AUTO: 0.3 K/UL (ref 0–0.5)
EOSINOPHIL NFR BLD: 2.3 % (ref 0–8)
ERYTHROCYTE [DISTWIDTH] IN BLOOD BY AUTOMATED COUNT: 16.9 % (ref 11.5–14.5)
EST. GFR  (NO RACE VARIABLE): 57.3 ML/MIN/1.73 M^2
GLUCOSE SERPL-MCNC: 76 MG/DL (ref 70–110)
HCT VFR BLD AUTO: 23.1 % (ref 40–54)
HGB BLD-MCNC: 7.2 G/DL (ref 14–18)
IMM GRANULOCYTES # BLD AUTO: 0.06 K/UL (ref 0–0.04)
IMM GRANULOCYTES NFR BLD AUTO: 0.5 % (ref 0–0.5)
LYMPHOCYTES # BLD AUTO: 0.7 K/UL (ref 1–4.8)
LYMPHOCYTES NFR BLD: 5.6 % (ref 18–48)
MAGNESIUM SERPL-MCNC: 1.9 MG/DL (ref 1.6–2.6)
MCH RBC QN AUTO: 25.2 PG (ref 27–31)
MCHC RBC AUTO-ENTMCNC: 31.2 G/DL (ref 32–36)
MCV RBC AUTO: 81 FL (ref 82–98)
MONOCYTES # BLD AUTO: 0.6 K/UL (ref 0.3–1)
MONOCYTES NFR BLD: 5.5 % (ref 4–15)
NEUTROPHILS # BLD AUTO: 10.1 K/UL (ref 1.8–7.7)
NEUTROPHILS NFR BLD: 85.9 % (ref 38–73)
NRBC BLD-RTO: 0 /100 WBC
PHOSPHATE SERPL-MCNC: 3.1 MG/DL (ref 2.7–4.5)
PLATELET # BLD AUTO: 341 K/UL (ref 150–450)
PMV BLD AUTO: 9.8 FL (ref 9.2–12.9)
POCT GLUCOSE: 104 MG/DL (ref 70–110)
POCT GLUCOSE: 124 MG/DL (ref 70–110)
POCT GLUCOSE: 139 MG/DL (ref 70–110)
POCT GLUCOSE: 98 MG/DL (ref 70–110)
POTASSIUM SERPL-SCNC: 3.7 MMOL/L (ref 3.5–5.1)
PROT SERPL-MCNC: 6.2 G/DL (ref 6–8.4)
RBC # BLD AUTO: 2.86 M/UL (ref 4.6–6.2)
SODIUM SERPL-SCNC: 142 MMOL/L (ref 136–145)
WBC # BLD AUTO: 11.71 K/UL (ref 3.9–12.7)

## 2024-03-09 PROCEDURE — 63600175 PHARM REV CODE 636 W HCPCS: Performed by: PHYSICIAN ASSISTANT

## 2024-03-09 PROCEDURE — 25000003 PHARM REV CODE 250

## 2024-03-09 PROCEDURE — 80053 COMPREHEN METABOLIC PANEL: CPT

## 2024-03-09 PROCEDURE — 20600001 HC STEP DOWN PRIVATE ROOM

## 2024-03-09 PROCEDURE — 99233 SBSQ HOSP IP/OBS HIGH 50: CPT | Mod: GC,,, | Performed by: INTERNAL MEDICINE

## 2024-03-09 PROCEDURE — 25000003 PHARM REV CODE 250: Performed by: INTERNAL MEDICINE

## 2024-03-09 PROCEDURE — A4216 STERILE WATER/SALINE, 10 ML: HCPCS

## 2024-03-09 PROCEDURE — 94761 N-INVAS EAR/PLS OXIMETRY MLT: CPT

## 2024-03-09 PROCEDURE — 36415 COLL VENOUS BLD VENIPUNCTURE: CPT

## 2024-03-09 PROCEDURE — 84100 ASSAY OF PHOSPHORUS: CPT

## 2024-03-09 PROCEDURE — 27000207 HC ISOLATION

## 2024-03-09 PROCEDURE — 83735 ASSAY OF MAGNESIUM: CPT

## 2024-03-09 PROCEDURE — 85025 COMPLETE CBC W/AUTO DIFF WBC: CPT

## 2024-03-09 PROCEDURE — 25000003 PHARM REV CODE 250: Performed by: PHYSICIAN ASSISTANT

## 2024-03-09 RX ORDER — PERMETHRIN 50 MG/G
CREAM TOPICAL DAILY
Status: DISPENSED | OUTPATIENT
Start: 2024-03-09 | End: 2024-03-11

## 2024-03-09 RX ADMIN — Medication 10 ML: at 12:03

## 2024-03-09 RX ADMIN — GABAPENTIN 300 MG: 300 CAPSULE ORAL at 09:03

## 2024-03-09 RX ADMIN — INSULIN ASPART 10 UNITS: 100 INJECTION, SOLUTION INTRAVENOUS; SUBCUTANEOUS at 09:03

## 2024-03-09 RX ADMIN — GABAPENTIN 300 MG: 300 CAPSULE ORAL at 08:03

## 2024-03-09 RX ADMIN — APIXABAN 5 MG: 5 TABLET, FILM COATED ORAL at 08:03

## 2024-03-09 RX ADMIN — ERTAPENEM 1 G: 1 INJECTION INTRAMUSCULAR; INTRAVENOUS at 12:03

## 2024-03-09 RX ADMIN — TAMSULOSIN HYDROCHLORIDE 0.4 MG: 0.4 CAPSULE ORAL at 09:03

## 2024-03-09 RX ADMIN — INSULIN ASPART 10 UNITS: 100 INJECTION, SOLUTION INTRAVENOUS; SUBCUTANEOUS at 06:03

## 2024-03-09 RX ADMIN — INSULIN ASPART 10 UNITS: 100 INJECTION, SOLUTION INTRAVENOUS; SUBCUTANEOUS at 12:03

## 2024-03-09 RX ADMIN — Medication 10 ML: at 08:03

## 2024-03-09 RX ADMIN — PERMETHRIN: 50 CREAM TOPICAL at 09:03

## 2024-03-09 RX ADMIN — PANTOPRAZOLE SODIUM 40 MG: 40 TABLET, DELAYED RELEASE ORAL at 09:03

## 2024-03-09 RX ADMIN — ACETAMINOPHEN 650 MG: 325 TABLET ORAL at 08:03

## 2024-03-09 RX ADMIN — ATORVASTATIN CALCIUM 40 MG: 40 TABLET, FILM COATED ORAL at 09:03

## 2024-03-09 RX ADMIN — ACETAMINOPHEN 650 MG: 325 TABLET ORAL at 09:03

## 2024-03-09 RX ADMIN — CLOPIDOGREL BISULFATE 75 MG: 75 TABLET ORAL at 09:03

## 2024-03-09 RX ADMIN — INSULIN DETEMIR 30 UNITS: 100 INJECTION, SOLUTION SUBCUTANEOUS at 08:03

## 2024-03-09 RX ADMIN — APIXABAN 5 MG: 5 TABLET, FILM COATED ORAL at 09:03

## 2024-03-09 RX ADMIN — NIFEDIPINE 90 MG: 30 TABLET, FILM COATED, EXTENDED RELEASE ORAL at 09:03

## 2024-03-09 NOTE — PLAN OF CARE
Problem: Adult Inpatient Plan of Care  Goal: Plan of Care Review  Outcome: Ongoing, Progressing  Goal: Patient-Specific Goal (Individualized)  Outcome: Ongoing, Progressing  Goal: Absence of Hospital-Acquired Illness or Injury  Outcome: Ongoing, Progressing  Goal: Optimal Comfort and Wellbeing  Outcome: Ongoing, Progressing  Goal: Readiness for Transition of Care  Outcome: Ongoing, Progressing     Problem: Diabetic Ketoacidosis  Goal: Fluid and Electrolyte Balance with Absence of Ketosis  Outcome: Ongoing, Progressing     Problem: Diabetes Comorbidity  Goal: Blood Glucose Level Within Targeted Range  Outcome: Ongoing, Progressing     Problem: Adjustment to Illness (Sepsis/Septic Shock)  Goal: Optimal Coping  Outcome: Ongoing, Progressing

## 2024-03-09 NOTE — NURSING
Pt blood pressure was recorded to be 64/30  mmhg meanwhile  other vs were wdl. The charge nurse , rapid nurse and the dr on call were all notified. Manual blood pressure machine was used and the bp was 110/50mmhg.    Pt is Aox4,sat at 98% room air with no obvious distress.  Will continue to monitor him.

## 2024-03-09 NOTE — ASSESSMENT & PLAN NOTE
Patient with Proteus on leg cultures collected by Podiatry with concern for acute on chronic osteo.    - IV ertapenem with end date 4/15/24   - midline catheters placed on admission for difficult IV access  -Still thinking about the amputation recommended by vascular surgery

## 2024-03-09 NOTE — ASSESSMENT & PLAN NOTE
"This patient does have evidence of infective focus  My overall impression is sepsis.  Source: Skin and Soft Tissue (location left leg)  Antibiotics given-   Antibiotics (72h ago, onward)    Start     Stop Route Frequency Ordered    03/08/24 1230  ertapenem (INVANZ) 1 g in sodium chloride 0.9 % 100 mL IVPB (MB+)         -- IV Every 24 hours (non-standard times) 03/08/24 1115        Latest lactate reviewed-  No results for input(s): "LACTATE", "POCLAC" in the last 72 hours.    Organ dysfunction indicated by Acute kidney injury    Fluid challenge Actual Body weight- Patient will receive 30ml/kg actual body weight to calculate fluid bolus for treatment of septic shock.     Post- resuscitation assessment No - Post resuscitation assessment not needed       Will Not start Pressors-     - See osteo    "

## 2024-03-09 NOTE — AI DETERIORATION ALERT
"RAPID RESPONSE NURSE AI ALERT       AI alert received.    Chart Reviewed: 03/09/2024, 12:13 AM    MRN: [id number]  Bed: 8078/8078 A    Dx: Osteomyelitis of left lower extremity    Saji Castañeda has a past medical history of Arthritis, Bacteremia due to Gram-negative bacteria, Diabetes mellitus, Diabetes mellitus, type 2, Hyperlipidemia, Hypertension, Osteomyelitis, and Palliative care encounter.    Last VS: BP (!) 96/53   Pulse 84   Temp 99.5 °F (37.5 °C) (Oral)   Resp 18   Ht 5' 9" (1.753 m)   Wt 120.2 kg (265 lb)   SpO2 96%   BMI 39.13 kg/m²     24H Vital Sign Range:  Temp:  [98.3 °F (36.8 °C)-100.3 °F (37.9 °C)]   Pulse:  []   Resp:  [16-19]   BP: ()/(30-71)   SpO2:  [90 %-98 %]     Level of Consciousness (AVPU): alert    Recent Labs     03/06/24  0847 03/07/24  0531 03/08/24  0748   WBC 10.65 8.37  8.37 10.34  10.34   HGB 9.7* 8.8*  8.8* 8.5*  8.5*   HCT 32.4* 29.3*  29.3* 28.5*  28.5*    361  361 390  390       Recent Labs     03/06/24  0846 03/06/24  0847 03/07/24  0531 03/07/24  1414 03/07/24  2309 03/08/24  0521 03/08/24  0748   NA  --    < > 139  139   < > 141  143 142  142 142   K  --    < > 4.2  4.2   < > 3.7  3.7 4.1  4.1 3.7   CL  --    < > 108  108   < > 108  109 108  108 107   CO2  --    < > 23  23   < > 24  25 23  23 26   BUN  --    < > 21  21   < > 19  18 19  19 20   CREATININE  --    < > 1.4  1.4   < > 1.3  1.6* 1.4  1.4 1.4   GLU  --    < > 165*  165*   < > 117*  121* 89  89 105   PHOS 1.8*  --  2.0*  --   --   --  3.4   MG 2.1  --  2.0  --   --   --  2.0    < > = values in this interval not displayed.        No results for input(s): "PH", "PCO2", "PO2", "HCO3", "POCSATURATED", "BE" in the last 72 hours.     OXYGEN:             MEWS score: 2    Charge RN, Aliya  contacted. No concerns verbalized at this time. Hypotensive reading earlier, manual blood pressure taken . Instructed to call [phone] for further concerns or assistance.    Aracelis CARRASCO" BEREKET Patel

## 2024-03-09 NOTE — PROGRESS NOTES
[facility] - Tele[facility] Medicine  Progress Note    Patient Name: Saji Castañeda  MRN: [id number]  Patient Class: IP- Inpatient   Admission Date: 3/5/2024  Length of Stay: 4 days  Attending Physician: Seymour Cullen MD  Primary Care Provider: [facility] -        Subjective:     Principal Problem:Osteomyelitis of left lower extremity        HPI:  Saji Castañeda is a 74 y.o. male with a medical history of DM2, HLD, HTN, chronic osteomyelitis of L heel, L TMA, PAD, hx of ESBL klebsiella, acinetobacter bacteremia, presenting with hyperglycemia. He presented to the ED via EMS and his POCT glucose on arrival was >500. Serum blood glucose 667 with anion gap of 17 and elevated ketones. Serum potassium 3.4, corrected sodium 149. Urinalysis significant for RBCs, glucosuria, and moderate bacteria and yeast. CBC revealed leukocytosis, elevated platelets, and mild anemia. CMP shows Na 135, K 3.4, BUN 29, Cr 2.1, Glucose 677, Lactate 4.61. BNP and troponin elevated. GFR 32.2. Insulin drip, IV fluids, zosyn, vancomycin, and potassium given.      He is unable to provide much history, but states that he has not been taking his home medications for at least a week. He reports shortness of breath, fatigue, and weakness for the last 6-7 days. He has chills and reports episodes of emesis. He denies abdominal pain, chest pain, palpitations, recent illness/infection, fevers, changes to bowel movements and urinary output. Patient lives with his brother and sister at home. He was last seen in the ED on 2/21 after a fall. He was hypoglycemic BGL 60 at that time which returned to normal after eating. He was also scheduled for a wound clinic appointment today 3/5 at [facility] with Dr. Wilson.        Overview/Hospital Course:  Wound care and cultures collected by Podiatry with recs for Vascular consult for amputation. Patient declined amputation. ID recommending IV Ertapenem for chronic osteomyelitis for 6 week duration  (EED: 4/15/24). With high wound care needs and IV antibiotics plan patient needing long-term placement.     Interval History: REMINGTON DOMINGUEZ rapids were called for a MAP of 41 but it was false alarm. Manual pressure was fine.    Review of Systems  Constitutional:  Negative for activity change, appetite change and fever.   Respiratory:  Negative for cough, shortness of breath and wheezing.    Cardiovascular:  Negative for chest pain and leg swelling.   Gastrointestinal:  Negative for abdominal pain, constipation, diarrhea, nausea and vomiting.   Musculoskeletal:  Positive for arthralgias and myalgias.   Skin:  Negative for rash.   Neurological:  Negative for headaches    Objective:     Vital Signs (Most Recent):  Temp: 98.9 °F (37.2 °C) (03/09/24 0747)  Pulse: 89 (03/09/24 0747)  Resp: 18 (03/09/24 0046)  BP: (!) 158/70 (03/09/24 0747)  SpO2: 96 % (03/09/24 0924) Vital Signs (24h Range):  Temp:  [98.9 °F (37.2 °C)-100.3 °F (37.9 °C)] 98.9 °F (37.2 °C)  Pulse:  [] 89  Resp:  [16-18] 18  SpO2:  [90 %-100 %] 96 %  BP: ()/(30-70) 158/70     Weight: 120.2 kg (265 lb)  Body mass index is 39.13 kg/m².  No intake or output data in the 24 hours ending 03/09/24 0958      Physical Exam      Constitutional:  Negative for activity change, appetite change and fever.   Respiratory:  Negative for cough, shortness of breath and wheezing.    Cardiovascular:  Negative for chest pain and leg swelling.   Gastrointestinal:  Negative for abdominal pain, constipation, diarrhea, nausea and vomiting.   Musculoskeletal:  Positive for arthralgias and myalgias.   Skin:  Negative for rash.   Neurological:  Negative for headaches  Significant Labs: All pertinent labs within the past 24 hours have been reviewed.    Significant Imaging: I have reviewed all pertinent imaging results/findings within the past 24 hours.    Assessment/Plan:      * Osteomyelitis of left lower extremity  Patient with Proteus on leg cultures collected by Podiatry  "with concern for acute on chronic osteo.    - IV ertapenem with end date 4/15/24   - midline catheters placed on admission for difficult IV access  -Still thinking about the amputation recommended by vascular surgery    Chronic anticoagulation  - See DVT      History of amputation of left high mid foot   See osteo      Stage 3b chronic kidney disease  Creatine stable for now. BMP reviewed- noted Estimated Creatinine Clearance: 58.4 mL/min (based on SCr of 1.4 mg/dL). according to latest data. Based on current GFR, CKD stage is stage 3 - GFR 30-59.  Monitor UOP and serial BMP and adjust therapy as needed. Renally dose meds. Avoid nephrotoxic medications and procedures.    Severe sepsis  This patient does have evidence of infective focus  My overall impression is sepsis.  Source: Skin and Soft Tissue (location left leg)  Antibiotics given-   Antibiotics (72h ago, onward)      Start     Stop Route Frequency Ordered    03/08/24 1230  ertapenem (INVANZ) 1 g in sodium chloride 0.9 % 100 mL IVPB (MB+)         -- IV Every 24 hours (non-standard times) 03/08/24 1115          Latest lactate reviewed-  No results for input(s): "LACTATE", "POCLAC" in the last 72 hours.    Organ dysfunction indicated by Acute kidney injury    Fluid challenge Actual Body weight- Patient will receive 30ml/kg actual body weight to calculate fluid bolus for treatment of septic shock.     Post- resuscitation assessment No - Post resuscitation assessment not needed       Will Not start Pressors-     - See osteo      Other hyperlipidemia  Continue home atorvastatin      Diabetic ketoacidosis without coma associated with type 2 diabetes mellitus  Patient's FSGs are uncontrolled due to hyperglycemia on current medication regimen.  Last A1c reviewed-   Lab Results   Component Value Date    HGBA1C >14.0 (H) 03/05/2024     Most recent fingerstick glucose reviewed-   Recent Labs   Lab 03/08/24  1232 03/08/24  1739 03/08/24  2057 03/09/24  0816   POCTGLUCOSE " 175* 86 116* 98       Current correctional scale   Insulin drip  Maintain anti-hyperglycemic dose as follows-   Antihyperglycemics (From admission, onward)      Start     Stop Route Frequency Ordered    03/07/24 1130  insulin aspart U-100 pen 10 Units         -- SubQ 3 times daily with meals 03/07/24 1028    03/05/24 1800  insulin detemir U-100 (Levemir) pen 30 Units         -- SubQ Nightly 03/05/24 1713          Hold Oral hypoglycemics while patient is in the hospital.  Will keep patient on DKA pathway with insulin drip and fluid with protocol in place for switching to subcutaneous insulin as anion gap closes.      DKA  HHS  - Resolved  - Continue insulin regimen and BG checks TIDWM and QHS      Paroxysmal atrial fibrillation  Patient with Persistent (7 days or more) atrial fibrillation which is uncontrolled currently with    . Patient is currently in sinus rhythm.[id number] Score: 4. . Anticoagulation indicated. Anticoagulation done with lovenox as we could not get IV access for heparin  .    -Restart plavix and apixaban    Chronic deep vein thrombosis (DVT) of right upper extremity  - Continue home AC      Cellulitis of left lower leg  See osteo      Peripheral arterial disease  Resume plavix      Essential hypertension  Chronic, uncontrolled. Latest blood pressure and vitals reviewed-     Temp:  [98.9 °F (37.2 °C)-100.3 °F (37.9 °C)]   Pulse:  []   Resp:  [16-18]   BP: ()/(30-70)   SpO2:  [90 %-100 %] .   Home meds for hypertension were reviewed and noted below.   Hypertension Medications               furosemide (LASIX) 40 MG tablet Take 20 mg by mouth.    hydrALAZINE (APRESOLINE) 100 MG tablet Take 1 tablet (100 mg total) by mouth every 8 (eight) hours.    isosorbide dinitrate (ISORDIL) 10 MG tablet Take 1 tablet (10 mg total) by mouth 3 (three) times daily.    NIFEdipine (PROCARDIA-XL) 60 MG (OSM) 24 hr tablet Take 1 tablet (60 mg total) by mouth once daily.            While in the hospital, will  manage blood pressure as follows; Adjust home antihypertensive regimen as follows- will keep hydralazine but hold valsartan and lasix    Will utilize p.r.n. blood pressure medication only if patient's blood pressure greater than 180/110 and he develops symptoms such as worsening chest pain or shortness of breath.      VTE Risk Mitigation (From admission, onward)           Ordered     apixaban tablet 5 mg  2 times daily         03/07/24 1810                    Discharge Planning   OXANA: 3/11/2024     Code Status: Full Code   Is the patient medically ready for discharge?: No    Reason for patient still in hospital (select all that apply): Treatment  Discharge Plan A: Skilled Nursing Facility   Discharge Delays: None known at this time              Lynette Perkins MD  Department of Hospital Medicine   [facility] - Telemetry Stepdown

## 2024-03-09 NOTE — ASSESSMENT & PLAN NOTE
Chronic, uncontrolled. Latest blood pressure and vitals reviewed-     Temp:  [98.9 °F (37.2 °C)-100.3 °F (37.9 °C)]   Pulse:  []   Resp:  [16-18]   BP: ()/(30-70)   SpO2:  [90 %-100 %] .   Home meds for hypertension were reviewed and noted below.   Hypertension Medications               furosemide (LASIX) 40 MG tablet Take 20 mg by mouth.    hydrALAZINE (APRESOLINE) 100 MG tablet Take 1 tablet (100 mg total) by mouth every 8 (eight) hours.    isosorbide dinitrate (ISORDIL) 10 MG tablet Take 1 tablet (10 mg total) by mouth 3 (three) times daily.    NIFEdipine (PROCARDIA-XL) 60 MG (OSM) 24 hr tablet Take 1 tablet (60 mg total) by mouth once daily.            While in the hospital, will manage blood pressure as follows; Adjust home antihypertensive regimen as follows- will keep hydralazine but hold valsartan and lasix    Will utilize p.r.n. blood pressure medication only if patient's blood pressure greater than 180/110 and he develops symptoms such as worsening chest pain or shortness of breath.

## 2024-03-09 NOTE — ASSESSMENT & PLAN NOTE
Patient's FSGs are uncontrolled due to hyperglycemia on current medication regimen.  Last A1c reviewed-   Lab Results   Component Value Date    HGBA1C >14.0 (H) 03/05/2024     Most recent fingerstick glucose reviewed-   Recent Labs   Lab 03/08/24  1232 03/08/24  1739 03/08/24  2057 03/09/24  0816   POCTGLUCOSE 175* 86 116* 98       Current correctional scale   Insulin drip  Maintain anti-hyperglycemic dose as follows-   Antihyperglycemics (From admission, onward)    Start     Stop Route Frequency Ordered    03/07/24 1130  insulin aspart U-100 pen 10 Units         -- SubQ 3 times daily with meals 03/07/24 1028    03/05/24 1800  insulin detemir U-100 (Levemir) pen 30 Units         -- SubQ Nightly 03/05/24 1713        Hold Oral hypoglycemics while patient is in the hospital.  Will keep patient on DKA pathway with insulin drip and fluid with protocol in place for switching to subcutaneous insulin as anion gap closes.      DKA  HHS  - Resolved  - Continue insulin regimen and BG checks TIDWM and QHS

## 2024-03-09 NOTE — CARE UPDATE
"RAPID RESPONSE NURSE CHART REVIEW        Chart Reviewed: 03/09/2024, 10:45 AM    MRN: [id number]  Bed: 8078/8078 A    Dx: Osteomyelitis of left lower extremity    Saji Castañeda has a past medical history of Arthritis, Bacteremia due to Gram-negative bacteria, Diabetes mellitus, Diabetes mellitus, type 2, Hyperlipidemia, Hypertension, Osteomyelitis, and Palliative care encounter.    Last VS: BP (!) 158/70 (BP Location: Right arm, Patient Position: Lying)   Pulse 89   Temp 98.9 °F (37.2 °C) (Oral)   Resp 18   Ht 5' 9" (1.753 m)   Wt 120.2 kg (265 lb)   SpO2 96%   BMI 39.13 kg/m²     24H Vital Sign Range:  Temp:  [98.9 °F (37.2 °C)-100.3 °F (37.9 °C)]   Pulse:  []   Resp:  [16-18]   BP: ()/(30-70)   SpO2:  [90 %-100 %]     Level of Consciousness (AVPU): alert    Recent Labs     03/07/24  0531 03/08/24  0748   WBC 8.37  8.37 10.34  10.34   HGB 8.8*  8.8* 8.5*  8.5*   HCT 29.3*  29.3* 28.5*  28.5*     361 390  390       Recent Labs     03/07/24  0531 03/07/24  1414 03/07/24  2309 03/08/24  0521 03/08/24  0748     139   < > 141  143 142  142 142   K 4.2  4.2   < > 3.7  3.7 4.1  4.1 3.7     108   < > 108  109 108  108 107   CO2 23  23   < > 24  25 23  23 26   BUN 21  21   < > 19  18 19  19 20   CREATININE 1.4  1.4   < > 1.3  1.6* 1.4  1.4 1.4   *  165*   < > 117*  121* 89  89 105   PHOS 2.0*  --   --   --  3.4   MG 2.0  --   --   --  2.0    < > = values in this interval not displayed.        No results for input(s): "PH", "PCO2", "PO2", "HCO3", "POCSATURATED", "BE" in the last 72 hours.     OXYGEN:             MEWS score: 1    Rounding completed with charge BEREKET Miller. Reports pt stable, VSS. No additional concerns verbalized at this time. Instructed to call [phone] for further concerns or assistance.    Shell Marshall RN       "

## 2024-03-09 NOTE — SUBJECTIVE & OBJECTIVE
Interval History: REMINGTON DOMINGUEZ rapids were called for a MAP of 41 but it was false alarm. Manual pressure was fine.    Review of Systems  Constitutional:  Negative for activity change, appetite change and fever.   Respiratory:  Negative for cough, shortness of breath and wheezing.    Cardiovascular:  Negative for chest pain and leg swelling.   Gastrointestinal:  Negative for abdominal pain, constipation, diarrhea, nausea and vomiting.   Musculoskeletal:  Positive for arthralgias and myalgias.   Skin:  Negative for rash.   Neurological:  Negative for headaches    Objective:     Vital Signs (Most Recent):  Temp: 98.9 °F (37.2 °C) (03/09/24 0747)  Pulse: 89 (03/09/24 0747)  Resp: 18 (03/09/24 0046)  BP: (!) 158/70 (03/09/24 0747)  SpO2: 96 % (03/09/24 0924) Vital Signs (24h Range):  Temp:  [98.9 °F (37.2 °C)-100.3 °F (37.9 °C)] 98.9 °F (37.2 °C)  Pulse:  [] 89  Resp:  [16-18] 18  SpO2:  [90 %-100 %] 96 %  BP: ()/(30-70) 158/70     Weight: 120.2 kg (265 lb)  Body mass index is 39.13 kg/m².  No intake or output data in the 24 hours ending 03/09/24 0958      Physical Exam      Constitutional:  Negative for activity change, appetite change and fever.   Respiratory:  Negative for cough, shortness of breath and wheezing.    Cardiovascular:  Negative for chest pain and leg swelling.   Gastrointestinal:  Negative for abdominal pain, constipation, diarrhea, nausea and vomiting.   Musculoskeletal:  Positive for arthralgias and myalgias.   Skin:  Negative for rash.   Neurological:  Negative for headaches  Significant Labs: All pertinent labs within the past 24 hours have been reviewed.    Significant Imaging: I have reviewed all pertinent imaging results/findings within the past 24 hours.

## 2024-03-09 NOTE — ASSESSMENT & PLAN NOTE
Patient with Persistent (7 days or more) atrial fibrillation which is uncontrolled currently with    . Patient is currently in sinus rhythm.[id number] Score: 4. . Anticoagulation indicated. Anticoagulation done with lovenox as we could not get IV access for heparin  .    -Restart plavix and apixaban

## 2024-03-10 LAB
ALBUMIN SERPL BCP-MCNC: 1.6 G/DL (ref 3.5–5.2)
ALP SERPL-CCNC: 67 U/L (ref 55–135)
ALT SERPL W/O P-5'-P-CCNC: 21 U/L (ref 10–44)
ANION GAP SERPL CALC-SCNC: 7 MMOL/L (ref 8–16)
AST SERPL-CCNC: 38 U/L (ref 10–40)
BACTERIA BLD CULT: NORMAL
BACTERIA BLD CULT: NORMAL
BASOPHILS # BLD AUTO: 0.04 K/UL (ref 0–0.2)
BASOPHILS NFR BLD: 0.4 % (ref 0–1.9)
BILIRUB SERPL-MCNC: 0.2 MG/DL (ref 0.1–1)
BUN SERPL-MCNC: 18 MG/DL (ref 8–23)
CALCIUM SERPL-MCNC: 8.1 MG/DL (ref 8.7–10.5)
CHLORIDE SERPL-SCNC: 103 MMOL/L (ref 95–110)
CO2 SERPL-SCNC: 28 MMOL/L (ref 23–29)
CREAT SERPL-MCNC: 1.3 MG/DL (ref 0.5–1.4)
DIFFERENTIAL METHOD BLD: ABNORMAL
EOSINOPHIL # BLD AUTO: 0.3 K/UL (ref 0–0.5)
EOSINOPHIL NFR BLD: 3.1 % (ref 0–8)
ERYTHROCYTE [DISTWIDTH] IN BLOOD BY AUTOMATED COUNT: 16.9 % (ref 11.5–14.5)
EST. GFR  (NO RACE VARIABLE): 57.3 ML/MIN/1.73 M^2
GLUCOSE SERPL-MCNC: 196 MG/DL (ref 70–110)
HCT VFR BLD AUTO: 25.4 % (ref 40–54)
HGB BLD-MCNC: 7.6 G/DL (ref 14–18)
IMM GRANULOCYTES # BLD AUTO: 0.1 K/UL (ref 0–0.04)
IMM GRANULOCYTES NFR BLD AUTO: 0.9 % (ref 0–0.5)
LYMPHOCYTES # BLD AUTO: 1 K/UL (ref 1–4.8)
LYMPHOCYTES NFR BLD: 9 % (ref 18–48)
MAGNESIUM SERPL-MCNC: 1.9 MG/DL (ref 1.6–2.6)
MCH RBC QN AUTO: 24.8 PG (ref 27–31)
MCHC RBC AUTO-ENTMCNC: 29.9 G/DL (ref 32–36)
MCV RBC AUTO: 83 FL (ref 82–98)
MONOCYTES # BLD AUTO: 0.7 K/UL (ref 0.3–1)
MONOCYTES NFR BLD: 6.2 % (ref 4–15)
NEUTROPHILS # BLD AUTO: 8.7 K/UL (ref 1.8–7.7)
NEUTROPHILS NFR BLD: 80.4 % (ref 38–73)
NRBC BLD-RTO: 0 /100 WBC
PHOSPHATE SERPL-MCNC: 3.3 MG/DL (ref 2.7–4.5)
PLATELET # BLD AUTO: 423 K/UL (ref 150–450)
PMV BLD AUTO: 9.6 FL (ref 9.2–12.9)
POCT GLUCOSE: 136 MG/DL (ref 70–110)
POCT GLUCOSE: 209 MG/DL (ref 70–110)
POCT GLUCOSE: 224 MG/DL (ref 70–110)
POCT GLUCOSE: 236 MG/DL (ref 70–110)
POTASSIUM SERPL-SCNC: 4.1 MMOL/L (ref 3.5–5.1)
PROT SERPL-MCNC: 6.2 G/DL (ref 6–8.4)
RBC # BLD AUTO: 3.07 M/UL (ref 4.6–6.2)
SODIUM SERPL-SCNC: 138 MMOL/L (ref 136–145)
WBC # BLD AUTO: 10.81 K/UL (ref 3.9–12.7)

## 2024-03-10 PROCEDURE — 63600175 PHARM REV CODE 636 W HCPCS: Performed by: PHYSICIAN ASSISTANT

## 2024-03-10 PROCEDURE — 84100 ASSAY OF PHOSPHORUS: CPT

## 2024-03-10 PROCEDURE — 25000003 PHARM REV CODE 250: Performed by: PHYSICIAN ASSISTANT

## 2024-03-10 PROCEDURE — 27000207 HC ISOLATION

## 2024-03-10 PROCEDURE — 25000003 PHARM REV CODE 250

## 2024-03-10 PROCEDURE — A4216 STERILE WATER/SALINE, 10 ML: HCPCS

## 2024-03-10 PROCEDURE — 85025 COMPLETE CBC W/AUTO DIFF WBC: CPT

## 2024-03-10 PROCEDURE — 83735 ASSAY OF MAGNESIUM: CPT

## 2024-03-10 PROCEDURE — 80053 COMPREHEN METABOLIC PANEL: CPT

## 2024-03-10 PROCEDURE — 99233 SBSQ HOSP IP/OBS HIGH 50: CPT | Mod: GC,,, | Performed by: INTERNAL MEDICINE

## 2024-03-10 PROCEDURE — 20600001 HC STEP DOWN PRIVATE ROOM

## 2024-03-10 PROCEDURE — 25000003 PHARM REV CODE 250: Performed by: INTERNAL MEDICINE

## 2024-03-10 RX ADMIN — APIXABAN 5 MG: 5 TABLET, FILM COATED ORAL at 09:03

## 2024-03-10 RX ADMIN — GABAPENTIN 300 MG: 300 CAPSULE ORAL at 09:03

## 2024-03-10 RX ADMIN — ERTAPENEM 1 G: 1 INJECTION INTRAMUSCULAR; INTRAVENOUS at 01:03

## 2024-03-10 RX ADMIN — PERMETHRIN: 50 CREAM TOPICAL at 09:03

## 2024-03-10 RX ADMIN — INSULIN ASPART 10 UNITS: 100 INJECTION, SOLUTION INTRAVENOUS; SUBCUTANEOUS at 09:03

## 2024-03-10 RX ADMIN — Medication 10 ML: at 07:03

## 2024-03-10 RX ADMIN — Medication 10 ML: at 06:03

## 2024-03-10 RX ADMIN — INSULIN ASPART 10 UNITS: 100 INJECTION, SOLUTION INTRAVENOUS; SUBCUTANEOUS at 05:03

## 2024-03-10 RX ADMIN — ACETAMINOPHEN 650 MG: 325 TABLET ORAL at 05:03

## 2024-03-10 RX ADMIN — ATORVASTATIN CALCIUM 40 MG: 40 TABLET, FILM COATED ORAL at 09:03

## 2024-03-10 RX ADMIN — CLOPIDOGREL BISULFATE 75 MG: 75 TABLET ORAL at 09:03

## 2024-03-10 RX ADMIN — NIFEDIPINE 90 MG: 30 TABLET, FILM COATED, EXTENDED RELEASE ORAL at 09:03

## 2024-03-10 RX ADMIN — PANTOPRAZOLE SODIUM 40 MG: 40 TABLET, DELAYED RELEASE ORAL at 09:03

## 2024-03-10 RX ADMIN — Medication 10 ML: at 01:03

## 2024-03-10 RX ADMIN — TAMSULOSIN HYDROCHLORIDE 0.4 MG: 0.4 CAPSULE ORAL at 09:03

## 2024-03-10 RX ADMIN — INSULIN DETEMIR 30 UNITS: 100 INJECTION, SOLUTION SUBCUTANEOUS at 09:03

## 2024-03-10 RX ADMIN — INSULIN ASPART 10 UNITS: 100 INJECTION, SOLUTION INTRAVENOUS; SUBCUTANEOUS at 01:03

## 2024-03-10 NOTE — SUBJECTIVE & OBJECTIVE
Interval History: REMINGTON DOMINGEUZ, patient still thinking about BKA/AKA, permethrin started for bed bugs.    Review of Systems    Constitutional:  Negative for activity change, appetite change and fever.   Respiratory:  Negative for cough, shortness of breath and wheezing.    Cardiovascular:  Negative for chest pain and leg swelling.   Gastrointestinal:  Negative for abdominal pain, constipation, diarrhea, nausea and vomiting.   Musculoskeletal:  Positive for arthralgias and myalgias.   Skin:  Negative for rash.   Neurological:  Negative for headaches.   Objective:     Vital Signs (Most Recent):  Temp: 98.3 °F (36.8 °C) (03/10/24 0727)  Pulse: 65 (03/10/24 0727)  Resp: 18 (03/10/24 0727)  BP: (!) 140/62 (03/10/24 0727)  SpO2: 96 % (03/10/24 0727) Vital Signs (24h Range):  Temp:  [98.3 °F (36.8 °C)-98.8 °F (37.1 °C)] 98.3 °F (36.8 °C)  Pulse:  [61-77] 65  Resp:  [16-18] 18  SpO2:  [93 %-96 %] 96 %  BP: (129-140)/(62-72) 140/62     Weight: 120.2 kg (265 lb)  Body mass index is 39.13 kg/m².  No intake or output data in the 24 hours ending 03/10/24 1109      Physical Exam      Vitals and nursing note reviewed.   Constitutional:       Appearance: He is obese.   HENT:      Head: Normocephalic and atraumatic.   Eyes:      Conjunctiva/sclera: Conjunctivae normal.      Pupils: Pupils are equal, round, and reactive to light.   Cardiovascular:      Rate and Rhythm: Normal rate and regular rhythm.      Pulses: Normal pulses.   Pulmonary:      Effort: Pulmonary effort is normal. No respiratory distress.      Breath sounds: No wheezing or rales.   Abdominal:      Palpations: Abdomen is soft.      Tenderness: There is no abdominal tenderness. There is no guarding.   Musculoskeletal:      Comments: LLE wounds with dressing C/D/I   Skin:     General: Skin is warm and dry.   Neurological:      General: No focal deficit present.      Mental Status: He is alert and oriented to person, place, and time.   Psychiatric:         Mood and Affect:  Mood normal.     Significant Labs: All pertinent labs within the past 24 hours have been reviewed.    Significant Imaging: I have reviewed all pertinent imaging results/findings within the past 24 hours.

## 2024-03-10 NOTE — ASSESSMENT & PLAN NOTE
Patient with Persistent (7 days or more) atrial fibrillation which is uncontrolled currently with    . Patient is currently in sinus rhythm.[id number] Score: 4. . Anticoagulation indicated. Anticoagulation done with lovenox as we could not get IV access for heparin  .    -Restart plavix and apixaban

## 2024-03-10 NOTE — ASSESSMENT & PLAN NOTE
Chronic, uncontrolled. Latest blood pressure and vitals reviewed-     Temp:  [98.3 °F (36.8 °C)-98.8 °F (37.1 °C)]   Pulse:  [61-77]   Resp:  [16-18]   BP: (129-140)/(62-72)   SpO2:  [93 %-96 %] .   Home meds for hypertension were reviewed and noted below.   Hypertension Medications               furosemide (LASIX) 40 MG tablet Take 20 mg by mouth.    hydrALAZINE (APRESOLINE) 100 MG tablet Take 1 tablet (100 mg total) by mouth every 8 (eight) hours.    isosorbide dinitrate (ISORDIL) 10 MG tablet Take 1 tablet (10 mg total) by mouth 3 (three) times daily.    NIFEdipine (PROCARDIA-XL) 60 MG (OSM) 24 hr tablet Take 1 tablet (60 mg total) by mouth once daily.            While in the hospital, will manage blood pressure as follows; Adjust home antihypertensive regimen as follows- will keep hydralazine but hold valsartan and lasix    Will utilize p.r.n. blood pressure medication only if patient's blood pressure greater than 180/110 and he develops symptoms such as worsening chest pain or shortness of breath.

## 2024-03-10 NOTE — PLAN OF CARE
Problem: Adult Inpatient Plan of Care  Goal: Plan of Care Review  Outcome: Ongoing, Progressing  Goal: Patient-Specific Goal (Individualized)  Outcome: Ongoing, Progressing  Goal: Absence of Hospital-Acquired Illness or Injury  Outcome: Ongoing, Progressing  Goal: Optimal Comfort and Wellbeing  Outcome: Ongoing, Progressing  Goal: Readiness for Transition of Care  Outcome: Ongoing, Progressing     Problem: Diabetic Ketoacidosis  Goal: Fluid and Electrolyte Balance with Absence of Ketosis  Outcome: Ongoing, Progressing     Problem: Diabetes Comorbidity  Goal: Blood Glucose Level Within Targeted Range  Outcome: Ongoing, Progressing     Problem: Adjustment to Illness (Sepsis/Septic Shock)  Goal: Optimal Coping  Outcome: Ongoing, Progressing       Problem: Nutrition Impaired (Sepsis/Septic Shock)  Goal: Optimal Nutrition Intake  Outcome: Ongoing, Progressing     Problem: Infection  Goal: Absence of Infection Signs and Symptoms  Outcome: Ongoing, Progressing     Problem: Impaired Wound Healing  Goal: Optimal Wound Healing  Outcome: Ongoing, Progressing     Problem: Fall Injury Risk  Goal: Absence of Fall and Fall-Related Injury  Outcome: Ongoing, Progressing

## 2024-03-10 NOTE — ASSESSMENT & PLAN NOTE
Patient's FSGs are uncontrolled due to hyperglycemia on current medication regimen.  Last A1c reviewed-   Lab Results   Component Value Date    HGBA1C >14.0 (H) 03/05/2024     Most recent fingerstick glucose reviewed-   Recent Labs   Lab 03/09/24  1215 03/09/24  1616 03/09/24  2027 03/10/24  0729   POCTGLUCOSE 139* 104 124* 136*       Current correctional scale   Insulin drip  Maintain anti-hyperglycemic dose as follows-   Antihyperglycemics (From admission, onward)    Start     Stop Route Frequency Ordered    03/07/24 1130  insulin aspart U-100 pen 10 Units         -- SubQ 3 times daily with meals 03/07/24 1028    03/05/24 1800  insulin detemir U-100 (Levemir) pen 30 Units         -- SubQ Nightly 03/05/24 1713        Hold Oral hypoglycemics while patient is in the hospital.  Will keep patient on DKA pathway with insulin drip and fluid with protocol in place for switching to subcutaneous insulin as anion gap closes.      DKA  HHS  - Resolved  - Continue insulin regimen and BG checks TIDWM and QHS

## 2024-03-10 NOTE — ASSESSMENT & PLAN NOTE
Creatine stable for now. BMP reviewed- noted Estimated Creatinine Clearance: 62.8 mL/min (based on SCr of 1.3 mg/dL). according to latest data. Based on current GFR, CKD stage is stage 3 - GFR 30-59.  Monitor UOP and serial BMP and adjust therapy as needed. Renally dose meds. Avoid nephrotoxic medications and procedures.

## 2024-03-10 NOTE — PROGRESS NOTES
[facility] - Telemetry [facility] Medicine  Progress Note    Patient Name: Saji Castañeda  MRN: [id number]  Patient Class: IP- Inpatient   Admission Date: 3/5/2024  Length of Stay: 5 days  Attending Physician: Seymour Cullen MD  Primary Care Provider: [facility] -        Subjective:     Principal Problem:Osteomyelitis of left lower extremity        HPI:  Saji Castañeda is a 74 y.o. male with a medical history of DM2, HLD, HTN, chronic osteomyelitis of L heel, L TMA, PAD, hx of ESBL klebsiella, acinetobacter bacteremia, presenting with hyperglycemia. He presented to the ED via EMS and his POCT glucose on arrival was >500. Serum blood glucose 667 with anion gap of 17 and elevated ketones. Serum potassium 3.4, corrected sodium 149. Urinalysis significant for RBCs, glucosuria, and moderate bacteria and yeast. CBC revealed leukocytosis, elevated platelets, and mild anemia. CMP shows Na 135, K 3.4, BUN 29, Cr 2.1, Glucose 677, Lactate 4.61. BNP and troponin elevated. GFR 32.2. Insulin drip, IV fluids, zosyn, vancomycin, and potassium given.      He is unable to provide much history, but states that he has not been taking his home medications for at least a week. He reports shortness of breath, fatigue, and weakness for the last 6-7 days. He has chills and reports episodes of emesis. He denies abdominal pain, chest pain, palpitations, recent illness/infection, fevers, changes to bowel movements and urinary output. Patient lives with his brother and sister at home. He was last seen in the ED on 2/21 after a fall. He was hypoglycemic BGL 60 at that time which returned to normal after eating. He was also scheduled for a wound clinic appointment today 3/5 at [facility] with Dr. Wilson.        Overview/Hospital Course:  Wound care and cultures collected by Podiatry with recs for Vascular consult for amputation. Patient declined amputation. ID recommending IV Ertapenem for chronic osteomyelitis for 6 week duration  (EED: 4/15/24). With high wound care needs and IV antibiotics plan patient needing long-term placement.     Interval History: ALBERTO, VSS, patient still thinking about BKA/AKA, permethrin started for bed bugs.    Review of Systems    Constitutional:  Negative for activity change, appetite change and fever.   Respiratory:  Negative for cough, shortness of breath and wheezing.    Cardiovascular:  Negative for chest pain and leg swelling.   Gastrointestinal:  Negative for abdominal pain, constipation, diarrhea, nausea and vomiting.   Musculoskeletal:  Positive for arthralgias and myalgias.   Skin:  Negative for rash.   Neurological:  Negative for headaches.   Objective:     Vital Signs (Most Recent):  Temp: 98.3 °F (36.8 °C) (03/10/24 0727)  Pulse: 65 (03/10/24 0727)  Resp: 18 (03/10/24 0727)  BP: (!) 140/62 (03/10/24 0727)  SpO2: 96 % (03/10/24 0727) Vital Signs (24h Range):  Temp:  [98.3 °F (36.8 °C)-98.8 °F (37.1 °C)] 98.3 °F (36.8 °C)  Pulse:  [61-77] 65  Resp:  [16-18] 18  SpO2:  [93 %-96 %] 96 %  BP: (129-140)/(62-72) 140/62     Weight: 120.2 kg (265 lb)  Body mass index is 39.13 kg/m².  No intake or output data in the 24 hours ending 03/10/24 1109      Physical Exam      Vitals and nursing note reviewed.   Constitutional:       Appearance: He is obese.   HENT:      Head: Normocephalic and atraumatic.   Eyes:      Conjunctiva/sclera: Conjunctivae normal.      Pupils: Pupils are equal, round, and reactive to light.   Cardiovascular:      Rate and Rhythm: Normal rate and regular rhythm.      Pulses: Normal pulses.   Pulmonary:      Effort: Pulmonary effort is normal. No respiratory distress.      Breath sounds: No wheezing or rales.   Abdominal:      Palpations: Abdomen is soft.      Tenderness: There is no abdominal tenderness. There is no guarding.   Musculoskeletal:      Comments: LLE wounds with dressing C/D/I   Skin:     General: Skin is warm and dry.   Neurological:      General: No focal deficit present.       "Mental Status: He is alert and oriented to person, place, and time.   Psychiatric:         Mood and Affect: Mood normal.     Significant Labs: All pertinent labs within the past 24 hours have been reviewed.    Significant Imaging: I have reviewed all pertinent imaging results/findings within the past 24 hours.    Assessment/Plan:      * Osteomyelitis of left lower extremity  Patient with Proteus on leg cultures collected by Podiatry with concern for acute on chronic osteo.    - IV ertapenem with end date 4/15/24   - midline catheters placed on admission for difficult IV access  -Still thinking about the amputation recommended by vascular surgery    History of Bedbug bite with infection  Patient started on Permethrin daily for 3 days starting 3/9/2024      Chronic anticoagulation  - See DVT      History of amputation of left high mid foot   See osteo      Stage 3b chronic kidney disease  Creatine stable for now. BMP reviewed- noted Estimated Creatinine Clearance: 62.8 mL/min (based on SCr of 1.3 mg/dL). according to latest data. Based on current GFR, CKD stage is stage 3 - GFR 30-59.  Monitor UOP and serial BMP and adjust therapy as needed. Renally dose meds. Avoid nephrotoxic medications and procedures.    Severe sepsis  This patient does have evidence of infective focus  My overall impression is sepsis.  Source: Skin and Soft Tissue (location left leg)  Antibiotics given-   Antibiotics (72h ago, onward)      Start     Stop Route Frequency Ordered    03/08/24 1230  ertapenem (INVANZ) 1 g in sodium chloride 0.9 % 100 mL IVPB (MB+)         -- IV Every 24 hours (non-standard times) 03/08/24 1115          Latest lactate reviewed-  No results for input(s): "LACTATE", "POCLAC" in the last 72 hours.    Organ dysfunction indicated by Acute kidney injury    Fluid challenge Actual Body weight- Patient will receive 30ml/kg actual body weight to calculate fluid bolus for treatment of septic shock.     Post- resuscitation " assessment No - Post resuscitation assessment not needed       Will Not start Pressors-     - See osteo      Other hyperlipidemia  Continue home atorvastatin      Diabetic ketoacidosis without coma associated with type 2 diabetes mellitus  Patient's FSGs are uncontrolled due to hyperglycemia on current medication regimen.  Last A1c reviewed-   Lab Results   Component Value Date    HGBA1C >14.0 (H) 03/05/2024     Most recent fingerstick glucose reviewed-   Recent Labs   Lab 03/09/24  1215 03/09/24  1616 03/09/24  2027 03/10/24  0729   POCTGLUCOSE 139* 104 124* 136*       Current correctional scale   Insulin drip  Maintain anti-hyperglycemic dose as follows-   Antihyperglycemics (From admission, onward)      Start     Stop Route Frequency Ordered    03/07/24 1130  insulin aspart U-100 pen 10 Units         -- SubQ 3 times daily with meals 03/07/24 1028    03/05/24 1800  insulin detemir U-100 (Levemir) pen 30 Units         -- SubQ Nightly 03/05/24 1713          Hold Oral hypoglycemics while patient is in the hospital.  Will keep patient on DKA pathway with insulin drip and fluid with protocol in place for switching to subcutaneous insulin as anion gap closes.      DKA  HHS  - Resolved  - Continue insulin regimen and BG checks TIDWM and QHS      Paroxysmal atrial fibrillation  Patient with Persistent (7 days or more) atrial fibrillation which is uncontrolled currently with    . Patient is currently in sinus rhythm.[id number] Score: 4. . Anticoagulation indicated. Anticoagulation done with lovenox as we could not get IV access for heparin  .    -Restart plavix and apixaban    Chronic deep vein thrombosis (DVT) of right upper extremity  - Continue home AC      Cellulitis of left lower leg  See osteo      Peripheral arterial disease  Resume plavix      Essential hypertension  Chronic, uncontrolled. Latest blood pressure and vitals reviewed-     Temp:  [98.3 °F (36.8 °C)-98.8 °F (37.1 °C)]   Pulse:  [61-77]   Resp:  [16-18]    BP: (129-140)/(62-72)   SpO2:  [93 %-96 %] .   Home meds for hypertension were reviewed and noted below.   Hypertension Medications               furosemide (LASIX) 40 MG tablet Take 20 mg by mouth.    hydrALAZINE (APRESOLINE) 100 MG tablet Take 1 tablet (100 mg total) by mouth every 8 (eight) hours.    isosorbide dinitrate (ISORDIL) 10 MG tablet Take 1 tablet (10 mg total) by mouth 3 (three) times daily.    NIFEdipine (PROCARDIA-XL) 60 MG (OSM) 24 hr tablet Take 1 tablet (60 mg total) by mouth once daily.            While in the hospital, will manage blood pressure as follows; Adjust home antihypertensive regimen as follows- will keep hydralazine but hold valsartan and lasix    Will utilize p.r.n. blood pressure medication only if patient's blood pressure greater than 180/110 and he develops symptoms such as worsening chest pain or shortness of breath.      VTE Risk Mitigation (From admission, onward)           Ordered     apixaban tablet 5 mg  2 times daily         03/07/24 1810                    Discharge Planning   OXANA: 3/12/2024     Code Status: Full Code   Is the patient medically ready for discharge?: No    Reason for patient still in hospital (select all that apply): Treatment  Discharge Plan A: Skilled Nursing Facility   Discharge Delays: None known at this time              Lynette Perkins MD  Department of Hospital Medicine   [facility] - Telemetry Stepdown

## 2024-03-10 NOTE — PLAN OF CARE
Problem: Adult Inpatient Plan of Care  Goal: Plan of Care Review  3/10/2024 0743 by Sofia García RN  Outcome: Ongoing, Progressing  3/10/2024 0743 by Sofia García RN  Outcome: Ongoing, Progressing  3/10/2024 0741 by Sofia García RN  Outcome: Ongoing, Progressing  Goal: Patient-Specific Goal (Individualized)  3/10/2024 0743 by Sofia García RN  Outcome: Ongoing, Progressing  3/10/2024 0743 by Sofia García RN  Outcome: Ongoing, Progressing  3/10/2024 0741 by Sofia García RN  Outcome: Ongoing, Progressing  Goal: Absence of Hospital-Acquired Illness or Injury  3/10/2024 0743 by Sofia García RN  Outcome: Ongoing, Progressing  3/10/2024 0743 by Sofia García RN  Outcome: Ongoing, Progressing  3/10/2024 0741 by Sofia García RN  Outcome: Ongoing, Progressing  Goal: Optimal Comfort and Wellbeing  3/10/2024 0743 by Sofia García RN  Outcome: Ongoing, Progressing  3/10/2024 0743 by Sofia García RN  Outcome: Ongoing, Progressing  3/10/2024 0741 by Sofia García RN  Outcome: Ongoing, Progressing  Goal: Readiness for Transition of Care  3/10/2024 0743 by Sofia García RN  Outcome: Ongoing, Progressing  3/10/2024 0743 by Sofia García RN  Outcome: Ongoing, Progressing  3/10/2024 0741 by Sofia García RN  Outcome: Ongoing, Progressing

## 2024-03-11 LAB
ALBUMIN SERPL BCP-MCNC: 1.5 G/DL (ref 3.5–5.2)
ALP SERPL-CCNC: 79 U/L (ref 55–135)
ALT SERPL W/O P-5'-P-CCNC: 18 U/L (ref 10–44)
ANION GAP SERPL CALC-SCNC: 8 MMOL/L (ref 8–16)
AST SERPL-CCNC: 29 U/L (ref 10–40)
BACTERIA SPEC ANAEROBE CULT: NORMAL
BASOPHILS # BLD AUTO: 0.04 K/UL (ref 0–0.2)
BASOPHILS NFR BLD: 0.5 % (ref 0–1.9)
BILIRUB SERPL-MCNC: 0.1 MG/DL (ref 0.1–1)
BUN SERPL-MCNC: 19 MG/DL (ref 8–23)
CALCIUM SERPL-MCNC: 8.1 MG/DL (ref 8.7–10.5)
CHLORIDE SERPL-SCNC: 103 MMOL/L (ref 95–110)
CO2 SERPL-SCNC: 27 MMOL/L (ref 23–29)
CREAT SERPL-MCNC: 1.5 MG/DL (ref 0.5–1.4)
DIFFERENTIAL METHOD BLD: ABNORMAL
EOSINOPHIL # BLD AUTO: 0.4 K/UL (ref 0–0.5)
EOSINOPHIL NFR BLD: 4.1 % (ref 0–8)
ERYTHROCYTE [DISTWIDTH] IN BLOOD BY AUTOMATED COUNT: 17.3 % (ref 11.5–14.5)
EST. GFR  (NO RACE VARIABLE): 48.2 ML/MIN/1.73 M^2
GLUCOSE SERPL-MCNC: 155 MG/DL (ref 70–110)
HCT VFR BLD AUTO: 24.4 % (ref 40–54)
HGB BLD-MCNC: 7.2 G/DL (ref 14–18)
IMM GRANULOCYTES # BLD AUTO: 0.15 K/UL (ref 0–0.04)
IMM GRANULOCYTES NFR BLD AUTO: 1.8 % (ref 0–0.5)
LYMPHOCYTES # BLD AUTO: 1.3 K/UL (ref 1–4.8)
LYMPHOCYTES NFR BLD: 15.4 % (ref 18–48)
MAGNESIUM SERPL-MCNC: 1.9 MG/DL (ref 1.6–2.6)
MCH RBC QN AUTO: 24.5 PG (ref 27–31)
MCHC RBC AUTO-ENTMCNC: 29.5 G/DL (ref 32–36)
MCV RBC AUTO: 83 FL (ref 82–98)
MONOCYTES # BLD AUTO: 0.6 K/UL (ref 0.3–1)
MONOCYTES NFR BLD: 7.2 % (ref 4–15)
NEUTROPHILS # BLD AUTO: 6 K/UL (ref 1.8–7.7)
NEUTROPHILS NFR BLD: 71 % (ref 38–73)
NRBC BLD-RTO: 0 /100 WBC
PHOSPHATE SERPL-MCNC: 3.1 MG/DL (ref 2.7–4.5)
PLATELET # BLD AUTO: 414 K/UL (ref 150–450)
PMV BLD AUTO: 9.8 FL (ref 9.2–12.9)
POCT GLUCOSE: 145 MG/DL (ref 70–110)
POCT GLUCOSE: 208 MG/DL (ref 70–110)
POCT GLUCOSE: 235 MG/DL (ref 70–110)
POCT GLUCOSE: 72 MG/DL (ref 70–110)
POTASSIUM SERPL-SCNC: 4.5 MMOL/L (ref 3.5–5.1)
PROT SERPL-MCNC: 5.9 G/DL (ref 6–8.4)
RBC # BLD AUTO: 2.94 M/UL (ref 4.6–6.2)
SODIUM SERPL-SCNC: 138 MMOL/L (ref 136–145)
WBC # BLD AUTO: 8.48 K/UL (ref 3.9–12.7)

## 2024-03-11 PROCEDURE — 36415 COLL VENOUS BLD VENIPUNCTURE: CPT

## 2024-03-11 PROCEDURE — 63600175 PHARM REV CODE 636 W HCPCS: Performed by: PHYSICIAN ASSISTANT

## 2024-03-11 PROCEDURE — 94761 N-INVAS EAR/PLS OXIMETRY MLT: CPT

## 2024-03-11 PROCEDURE — 97535 SELF CARE MNGMENT TRAINING: CPT

## 2024-03-11 PROCEDURE — 25000003 PHARM REV CODE 250: Performed by: INTERNAL MEDICINE

## 2024-03-11 PROCEDURE — 20600001 HC STEP DOWN PRIVATE ROOM

## 2024-03-11 PROCEDURE — 25000003 PHARM REV CODE 250

## 2024-03-11 PROCEDURE — 85025 COMPLETE CBC W/AUTO DIFF WBC: CPT

## 2024-03-11 PROCEDURE — 99233 SBSQ HOSP IP/OBS HIGH 50: CPT | Mod: GC,,, | Performed by: INTERNAL MEDICINE

## 2024-03-11 PROCEDURE — 83735 ASSAY OF MAGNESIUM: CPT

## 2024-03-11 PROCEDURE — 25000003 PHARM REV CODE 250: Performed by: PHYSICIAN ASSISTANT

## 2024-03-11 PROCEDURE — 97530 THERAPEUTIC ACTIVITIES: CPT

## 2024-03-11 PROCEDURE — 27000207 HC ISOLATION

## 2024-03-11 PROCEDURE — A4216 STERILE WATER/SALINE, 10 ML: HCPCS

## 2024-03-11 PROCEDURE — 80053 COMPREHEN METABOLIC PANEL: CPT

## 2024-03-11 PROCEDURE — 84100 ASSAY OF PHOSPHORUS: CPT

## 2024-03-11 RX ORDER — INSULIN ASPART 100 [IU]/ML
0-5 INJECTION, SOLUTION INTRAVENOUS; SUBCUTANEOUS
Status: DISCONTINUED | OUTPATIENT
Start: 2024-03-11 | End: 2024-03-27

## 2024-03-11 RX ADMIN — Medication 10 ML: at 06:03

## 2024-03-11 RX ADMIN — CLOPIDOGREL BISULFATE 75 MG: 75 TABLET ORAL at 09:03

## 2024-03-11 RX ADMIN — APIXABAN 5 MG: 5 TABLET, FILM COATED ORAL at 09:03

## 2024-03-11 RX ADMIN — ERTAPENEM 1 G: 1 INJECTION INTRAMUSCULAR; INTRAVENOUS at 12:03

## 2024-03-11 RX ADMIN — TAMSULOSIN HYDROCHLORIDE 0.4 MG: 0.4 CAPSULE ORAL at 09:03

## 2024-03-11 RX ADMIN — GABAPENTIN 300 MG: 300 CAPSULE ORAL at 09:03

## 2024-03-11 RX ADMIN — PANTOPRAZOLE SODIUM 40 MG: 40 TABLET, DELAYED RELEASE ORAL at 09:03

## 2024-03-11 RX ADMIN — Medication 10 ML: at 11:03

## 2024-03-11 RX ADMIN — ACETAMINOPHEN 650 MG: 325 TABLET ORAL at 11:03

## 2024-03-11 RX ADMIN — NIFEDIPINE 90 MG: 30 TABLET, FILM COATED, EXTENDED RELEASE ORAL at 09:03

## 2024-03-11 RX ADMIN — INSULIN DETEMIR 30 UNITS: 100 INJECTION, SOLUTION SUBCUTANEOUS at 09:03

## 2024-03-11 RX ADMIN — SODIUM CHLORIDE 500 ML: 0.9 INJECTION, SOLUTION INTRAVENOUS at 08:03

## 2024-03-11 RX ADMIN — ATORVASTATIN CALCIUM 40 MG: 40 TABLET, FILM COATED ORAL at 09:03

## 2024-03-11 RX ADMIN — Medication 10 ML: at 12:03

## 2024-03-11 RX ADMIN — ACETAMINOPHEN 650 MG: 325 TABLET ORAL at 02:03

## 2024-03-11 RX ADMIN — INSULIN ASPART 10 UNITS: 100 INJECTION, SOLUTION INTRAVENOUS; SUBCUTANEOUS at 08:03

## 2024-03-11 RX ADMIN — INSULIN ASPART 10 UNITS: 100 INJECTION, SOLUTION INTRAVENOUS; SUBCUTANEOUS at 11:03

## 2024-03-11 RX ADMIN — INSULIN DETEMIR 30 UNITS: 100 INJECTION, SOLUTION SUBCUTANEOUS at 08:03

## 2024-03-11 NOTE — PT/OT/SLP PROGRESS
Occupational Therapy   Treatment    Name: Saji Castañeda  MRN: [id number]  Admitting Diagnosis:  Osteomyelitis of left lower extremity       Recommendations:     Discharge Recommendations: Moderate Intensity Therapy  Discharge Equipment Recommendations:  hospital bed, lift device  Barriers to discharge:  Other (Comment) (requires extensive assist)    Assessment:     Saji aCstañeda is a 74 y.o. male with a medical diagnosis of Osteomyelitis of left lower extremity.  He presents with significant weakness as well as pain from wounds on buttocks region. Pt. Requires extensive assist for all mobility. Patient would benefit from continued OT services to maximize level of safety and independence with self-care tasks.   . Performance deficits affecting function are weakness, impaired endurance, impaired sensation, impaired self care skills, impaired balance, pain, edema, impaired skin, decreased lower extremity function, decreased ROM, decreased upper extremity function, impaired fine motor.     Rehab Prognosis:  Fair; patient would benefit from acute skilled OT services to address these deficits and reach maximum level of function.       Plan:     Patient to be seen 4 x/week to address the above listed problems via self-care/home management, therapeutic activities, therapeutic exercises, neuromuscular re-education  Plan of Care Expires: 04/06/24  Plan of Care Reviewed with: patient    Subjective     Chief Complaint: Pt. Reporting significant pain  Patient/Family Comments/goals: to feel better  Pain/Comfort:  Pain Rating 1: 10/10  Location - Side 1: Left  Location 1:  (buttocks)  Pain Addressed 1: Reposition, Distraction, Nurse notified  Pain Rating Post-Intervention 1: 10/10    Objective:     Communicated with: nurse prior to session.  Patient found supine with telemetry, pulse ox (continuous) upon OT entry to room.    General Precautions: Standard, fall, contact, diabetic    Orthopedic Precautions:LLE non weight bearing, RLE  weight bearing as tolerated (in Darco shoe)  Braces:  (Darco)  Respiratory Status: Room air     Occupational Performance:     Bed Mobility:    Patient completed Rolling/Turning to Left with  total assistance and 2 persons  Patient completed Rolling/Turning to Right with total assistance and 2 persons   Attempted supine to sit but unable to attain upright position with Total A x 1 and no tech staff available.     Functional Mobility/Transfers:  Drawsheet to HOB    Activities of Daily Living:  Toileting: dependence for cleaning on this date         Grooming: SBA in supine with HOB elevated to wash face         UBD: Max A to don fresh gown forward    [facility] 6 Click ADL: 12    Treatment & Education:  Pt. Educated on need to be repositoned in bed during day to assist with maintnenance of skin integrity  OT spoke to charge nurse: re pt. Wounds and need for at minimum an air mattress  OT messaged wound care and MD about wounds on pt. Left buttock as well as sacral region.         Patient left right sidelying with all lines intact, call button in reach, and charge nurse notified    GOALS:   Multidisciplinary Problems       Occupational Therapy Goals          Problem: Occupational Therapy    Goal Priority Disciplines Outcome Interventions   Occupational Therapy Goal     OT, PT/OT Ongoing, Progressing    Description: Goals to be met by: 4/6/24     Patient will increase functional independence with ADLs by performing:    UE Dressing with Contact Guard Assistance.  LE Dressing with Maximum Assistance.  Grooming while EOB with Stand-by Assistance.  Toileting from bedside commode with Maximum Assistance for hygiene and clothing management.   Sitting at edge of bed x5 minutes with Stand-by Assistance.  Rolling to Bilateral with Minimal Assistance.   Supine to sit with Minimal Assistance.  Stand pivot transfers with Minimal Assistance.  Toilet transfer to bedside commode with Maximum Assistance.  BUE strengthening exercise program  2x day with theraband/HEP worksheet                         Time Tracking:     OT Date of Treatment: 03/11/24  OT Start Time: 1402  OT Stop Time: 1427  OT Total Time (min): 25 min    Billable Minutes:Self Care/Home Management 15  Therapeutic Activity 10    OT/KATHLEEN: OT          3/11/2024

## 2024-03-11 NOTE — PLAN OF CARE
PT alert and oriented x 4. Able to voice all wants and needs. All needs met.  He has remained free of falls and injuries. Safety eduction and plan of care reviewed with PT and he voiced understanding. Bed is low and locked with call light with in easy reach.  Bed alarm set.

## 2024-03-11 NOTE — PLAN OF CARE
SW reviewing pt's medical record including SNF referrals. SW sending pt's Wound Care information as requested by many SNF facilities for review via [employer].     Keagan Cabrera LMSW

## 2024-03-11 NOTE — NURSING
Nurses Note -- 4 Eyes      3/11/2024   0704AM      Skin assessed during: Q Shift Change      [] No Altered Skin Integrity Present    []Prevention Measures Documented      [x] Yes- Altered Skin Integrity Present or Discovered   [] LDA Added if Not in Epic (Describe Wound)   [] New Altered Skin Integrity was Present on Admit and Documented in LDA   [] Wound Image Taken    Wound Care Consulted? No    Attending Nurse:  Yue CUBA    Second RN/Staff Member:   Ara CUBA

## 2024-03-11 NOTE — ASSESSMENT & PLAN NOTE
Patient with Persistent (7 days or more) atrial fibrillation which is uncontrolled currently with    . Patient is currently in sinus rhythm.[id number] Score: 4. . Anticoagulation indicated. Anticoagulation done with lovenox as we could not get IV access for heparin  .    -Restart plavix and apixaban

## 2024-03-11 NOTE — ASSESSMENT & PLAN NOTE
Chronic, uncontrolled. Latest blood pressure and vitals reviewed-     Temp:  [98 °F (36.7 °C)-98.4 °F (36.9 °C)]   Pulse:  [60-80]   Resp:  [18-20]   BP: (118-133)/(56-63)   SpO2:  [96 %-99 %] .   Home meds for hypertension were reviewed and noted below.   Hypertension Medications               furosemide (LASIX) 40 MG tablet Take 20 mg by mouth.    hydrALAZINE (APRESOLINE) 100 MG tablet Take 1 tablet (100 mg total) by mouth every 8 (eight) hours.    isosorbide dinitrate (ISORDIL) 10 MG tablet Take 1 tablet (10 mg total) by mouth 3 (three) times daily.    NIFEdipine (PROCARDIA-XL) 60 MG (OSM) 24 hr tablet Take 1 tablet (60 mg total) by mouth once daily.            While in the hospital, will manage blood pressure as follows; Adjust home antihypertensive regimen as follows- will keep hydralazine but hold valsartan and lasix    Will utilize p.r.n. blood pressure medication only if patient's blood pressure greater than 180/110 and he develops symptoms such as worsening chest pain or shortness of breath.

## 2024-03-11 NOTE — PLAN OF CARE
Problem: Adult Inpatient Plan of Care  Goal: Plan of Care Review  Outcome: Ongoing, Not Progressing  Goal: Patient-Specific Goal (Individualized)  Outcome: Ongoing, Not Progressing  Goal: Absence of Hospital-Acquired Illness or Injury  Outcome: Ongoing, Not Progressing  Goal: Optimal Comfort and Wellbeing  Outcome: Ongoing, Not Progressing  Goal: Readiness for Transition of Care  Outcome: Ongoing, Not Progressing     Problem: Diabetic Ketoacidosis  Goal: Fluid and Electrolyte Balance with Absence of Ketosis  Outcome: Ongoing, Not Progressing     Problem: Diabetes Comorbidity  Goal: Blood Glucose Level Within Targeted Range  Outcome: Ongoing, Not Progressing   Accuchecks x2 this shift requiring coverage. Specialty bed being ordered. Wound care consulted for sacrum and buttock wounds.

## 2024-03-11 NOTE — ASSESSMENT & PLAN NOTE
Creatine stable for now. BMP reviewed- noted Estimated Creatinine Clearance: 54.5 mL/min (A) (based on SCr of 1.5 mg/dL (H)). according to latest data. Based on current GFR, CKD stage is stage 3 - GFR 30-59.  Monitor UOP and serial BMP and adjust therapy as needed. Renally dose meds. Avoid nephrotoxic medications and procedures.

## 2024-03-11 NOTE — ASSESSMENT & PLAN NOTE
Patient's FSGs are uncontrolled due to hyperglycemia on current medication regimen.  Last A1c reviewed-   Lab Results   Component Value Date    HGBA1C >14.0 (H) 03/05/2024     Most recent fingerstick glucose reviewed-   Recent Labs   Lab 03/10/24  1313 03/10/24  1611 03/10/24  2128   POCTGLUCOSE 209* 236* 224*       Current correctional scale   Insulin drip  Maintain anti-hyperglycemic dose as follows-   Antihyperglycemics (From admission, onward)    Start     Stop Route Frequency Ordered    03/07/24 1130  insulin aspart U-100 pen 10 Units         -- SubQ 3 times daily with meals 03/07/24 1028    03/05/24 1800  insulin detemir U-100 (Levemir) pen 30 Units         -- SubQ Nightly 03/05/24 1713        Hold Oral hypoglycemics while patient is in the hospital.  Will keep patient on DKA pathway with insulin drip and fluid with protocol in place for switching to subcutaneous insulin as anion gap closes.      DKA  HHS  - Resolved  - Continue insulin regimen and BG checks TIDWM and QHS

## 2024-03-11 NOTE — PROGRESS NOTES
[facility] - Tele[facility] Medicine  Progress Note    Patient Name: Saji Castañeda  MRN: [id number]  Patient Class: IP- Inpatient   Admission Date: 3/5/2024  Length of Stay: 6 days  Attending Physician: Seymour Cullen MD  Primary Care Provider: [facility] -        Subjective:     Principal Problem:Osteomyelitis of left lower extremity        HPI:  Saji Castañeda is a 74 y.o. male with a medical history of DM2, HLD, HTN, chronic osteomyelitis of L heel, L TMA, PAD, hx of ESBL klebsiella, acinetobacter bacteremia, presenting with hyperglycemia. He presented to the ED via EMS and his POCT glucose on arrival was >500. Serum blood glucose 667 with anion gap of 17 and elevated ketones. Serum potassium 3.4, corrected sodium 149. Urinalysis significant for RBCs, glucosuria, and moderate bacteria and yeast. CBC revealed leukocytosis, elevated platelets, and mild anemia. CMP shows Na 135, K 3.4, BUN 29, Cr 2.1, Glucose 677, Lactate 4.61. BNP and troponin elevated. GFR 32.2. Insulin drip, IV fluids, zosyn, vancomycin, and potassium given.      He is unable to provide much history, but states that he has not been taking his home medications for at least a week. He reports shortness of breath, fatigue, and weakness for the last 6-7 days. He has chills and reports episodes of emesis. He denies abdominal pain, chest pain, palpitations, recent illness/infection, fevers, changes to bowel movements and urinary output. Patient lives with his brother and sister at home. He was last seen in the ED on 2/21 after a fall. He was hypoglycemic BGL 60 at that time which returned to normal after eating. He was also scheduled for a wound clinic appointment today 3/5 at [facility] with Dr. Wilson.        Overview/Hospital Course:  Wound care and cultures collected by Podiatry with recs for Vascular consult for amputation. Patient declined amputation. ID recommending IV Ertapenem for chronic osteomyelitis for 6 week duration  (EED: 4/15/24). With high wound care needs and IV antibiotics plan patient needing long-term placement.     Interval History: rhianna singleton patient still has not decided abut getting amputation.    Review of Systems    Constitutional:  Negative for activity change, appetite change and fever.   Respiratory:  Negative for cough, shortness of breath and wheezing.    Cardiovascular:  Negative for chest pain and leg swelling.   Gastrointestinal:  Negative for abdominal pain, constipation, diarrhea, nausea and vomiting.   Musculoskeletal:  Positive for arthralgias and myalgias.   Skin:  Negative for rash.   Neurological:  Negative for headaches.   Objective:     Vital Signs (Most Recent):  Temp: 98.1 °F (36.7 °C) (03/11/24 0435)  Pulse: 61 (03/11/24 0435)  Resp: 20 (03/11/24 0435)  BP: (!) 118/56 (03/11/24 0435)  SpO2: 96 % (03/11/24 0435) Vital Signs (24h Range):  Temp:  [98 °F (36.7 °C)-98.4 °F (36.9 °C)] 98.1 °F (36.7 °C)  Pulse:  [60-80] 61  Resp:  [18-20] 20  SpO2:  [96 %-99 %] 96 %  BP: (118-133)/(56-63) 118/56     Weight: 120.2 kg (265 lb)  Body mass index is 39.13 kg/m².    Intake/Output Summary (Last 24 hours) at 3/11/2024 0730  Last data filed at 3/11/2024 0643  Gross per 24 hour   Intake 920 ml   Output 900 ml   Net 20 ml         Physical Exam  Vitals and nursing note reviewed.   Constitutional:       Appearance: He is obese.   HENT:      Head: Normocephalic and atraumatic.   Eyes:      Conjunctiva/sclera: Conjunctivae normal.      Pupils: Pupils are equal, round, and reactive to light.   Cardiovascular:      Rate and Rhythm: Normal rate and regular rhythm.      Pulses: Normal pulses.   Pulmonary:      Effort: Pulmonary effort is normal. No respiratory distress.      Breath sounds: No wheezing or rales.   Abdominal:      Palpations: Abdomen is soft.      Tenderness: There is no abdominal tenderness. There is no guarding.   Musculoskeletal:      Comments: LLE wounds with dressing C/D/I   Skin:     General: Skin is  "warm and dry.   Neurological:      General: No focal deficit present.      Mental Status: He is alert and oriented to person, place, and time.   Psychiatric:         Mood and Affect: Mood normal.          Significant Labs: All pertinent labs within the past 24 hours have been reviewed.    Significant Imaging: I have reviewed all pertinent imaging results/findings within the past 24 hours.    Assessment/Plan:      * Osteomyelitis of left lower extremity  Patient with Proteus on leg cultures collected by Podiatry with concern for acute on chronic osteo.    - IV ertapenem with end date 4/15/24   - midline catheters placed on admission for difficult IV access  -Still thinking about the amputation recommended by vascular surgery    History of Bedbug bite with infection  Patient started on Permethrin daily for 3 days starting 3/9/2024      Chronic anticoagulation  - See DVT      History of amputation of left high mid foot   See osteo      Stage 3b chronic kidney disease  Creatine stable for now. BMP reviewed- noted Estimated Creatinine Clearance: 54.5 mL/min (A) (based on SCr of 1.5 mg/dL (H)). according to latest data. Based on current GFR, CKD stage is stage 3 - GFR 30-59.  Monitor UOP and serial BMP and adjust therapy as needed. Renally dose meds. Avoid nephrotoxic medications and procedures.    Severe sepsis  This patient does have evidence of infective focus  My overall impression is sepsis.  Source: Skin and Soft Tissue (location left leg)  Antibiotics given-   Antibiotics (72h ago, onward)      Start     Stop Route Frequency Ordered    03/08/24 1230  ertapenem (INVANZ) 1 g in sodium chloride 0.9 % 100 mL IVPB (MB+)         -- IV Every 24 hours (non-standard times) 03/08/24 1115          Latest lactate reviewed-  No results for input(s): "LACTATE", "POCLAC" in the last 72 hours.    Organ dysfunction indicated by Acute kidney injury    Fluid challenge Actual Body weight- Patient will receive 30ml/kg actual body " weight to calculate fluid bolus for treatment of septic shock.     Post- resuscitation assessment No - Post resuscitation assessment not needed       Will Not start Pressors-     - See osteo      Other hyperlipidemia  Continue home atorvastatin      Diabetic ketoacidosis without coma associated with type 2 diabetes mellitus  Patient's FSGs are uncontrolled due to hyperglycemia on current medication regimen.  Last A1c reviewed-   Lab Results   Component Value Date    HGBA1C >14.0 (H) 03/05/2024     Most recent fingerstick glucose reviewed-   Recent Labs   Lab 03/10/24  1313 03/10/24  1611 03/10/24  2128   POCTGLUCOSE 209* 236* 224*       Current correctional scale   Insulin drip  Maintain anti-hyperglycemic dose as follows-   Antihyperglycemics (From admission, onward)      Start     Stop Route Frequency Ordered    03/07/24 1130  insulin aspart U-100 pen 10 Units         -- SubQ 3 times daily with meals 03/07/24 1028    03/05/24 1800  insulin detemir U-100 (Levemir) pen 30 Units         -- SubQ Nightly 03/05/24 1713          Hold Oral hypoglycemics while patient is in the hospital.  Will keep patient on DKA pathway with insulin drip and fluid with protocol in place for switching to subcutaneous insulin as anion gap closes.      DKA  HHS  - Resolved  - Continue insulin regimen and BG checks TIDWM and QHS      Paroxysmal atrial fibrillation  Patient with Persistent (7 days or more) atrial fibrillation which is uncontrolled currently with    . Patient is currently in sinus rhythm.[id number] Score: 4. . Anticoagulation indicated. Anticoagulation done with lovenox as we could not get IV access for heparin  .    -Restart plavix and apixaban    Chronic deep vein thrombosis (DVT) of right upper extremity  - Continue home AC      Cellulitis of left lower leg  See osteo      Peripheral arterial disease  Resume plavix      Essential hypertension  Chronic, uncontrolled. Latest blood pressure and vitals reviewed-     Temp:  [98  °F (36.7 °C)-98.4 °F (36.9 °C)]   Pulse:  [60-80]   Resp:  [18-20]   BP: (118-133)/(56-63)   SpO2:  [96 %-99 %] .   Home meds for hypertension were reviewed and noted below.   Hypertension Medications               furosemide (LASIX) 40 MG tablet Take 20 mg by mouth.    hydrALAZINE (APRESOLINE) 100 MG tablet Take 1 tablet (100 mg total) by mouth every 8 (eight) hours.    isosorbide dinitrate (ISORDIL) 10 MG tablet Take 1 tablet (10 mg total) by mouth 3 (three) times daily.    NIFEdipine (PROCARDIA-XL) 60 MG (OSM) 24 hr tablet Take 1 tablet (60 mg total) by mouth once daily.            While in the hospital, will manage blood pressure as follows; Adjust home antihypertensive regimen as follows- will keep hydralazine but hold valsartan and lasix    Will utilize p.r.n. blood pressure medication only if patient's blood pressure greater than 180/110 and he develops symptoms such as worsening chest pain or shortness of breath.      VTE Risk Mitigation (From admission, onward)           Ordered     apixaban tablet 5 mg  2 times daily         03/07/24 1810                    Discharge Planning   OXANA: 3/12/2024     Code Status: Full Code   Is the patient medically ready for discharge?: No    Reason for patient still in hospital (select all that apply): Treatment  Discharge Plan A: Skilled Nursing Facility   Discharge Delays: None known at this time              Lynette Perkins MD  Department of Hospital Medicine   [facility] - Telemetry Stepdown

## 2024-03-11 NOTE — SUBJECTIVE & OBJECTIVE
Interval History: rhianna singleton patient still has not decided abut getting amputation.    Review of Systems    Constitutional:  Negative for activity change, appetite change and fever.   Respiratory:  Negative for cough, shortness of breath and wheezing.    Cardiovascular:  Negative for chest pain and leg swelling.   Gastrointestinal:  Negative for abdominal pain, constipation, diarrhea, nausea and vomiting.   Musculoskeletal:  Positive for arthralgias and myalgias.   Skin:  Negative for rash.   Neurological:  Negative for headaches.   Objective:     Vital Signs (Most Recent):  Temp: 98.1 °F (36.7 °C) (03/11/24 0435)  Pulse: 61 (03/11/24 0435)  Resp: 20 (03/11/24 0435)  BP: (!) 118/56 (03/11/24 0435)  SpO2: 96 % (03/11/24 0435) Vital Signs (24h Range):  Temp:  [98 °F (36.7 °C)-98.4 °F (36.9 °C)] 98.1 °F (36.7 °C)  Pulse:  [60-80] 61  Resp:  [18-20] 20  SpO2:  [96 %-99 %] 96 %  BP: (118-133)/(56-63) 118/56     Weight: 120.2 kg (265 lb)  Body mass index is 39.13 kg/m².    Intake/Output Summary (Last 24 hours) at 3/11/2024 0730  Last data filed at 3/11/2024 0643  Gross per 24 hour   Intake 920 ml   Output 900 ml   Net 20 ml         Physical Exam  Vitals and nursing note reviewed.   Constitutional:       Appearance: He is obese.   HENT:      Head: Normocephalic and atraumatic.   Eyes:      Conjunctiva/sclera: Conjunctivae normal.      Pupils: Pupils are equal, round, and reactive to light.   Cardiovascular:      Rate and Rhythm: Normal rate and regular rhythm.      Pulses: Normal pulses.   Pulmonary:      Effort: Pulmonary effort is normal. No respiratory distress.      Breath sounds: No wheezing or rales.   Abdominal:      Palpations: Abdomen is soft.      Tenderness: There is no abdominal tenderness. There is no guarding.   Musculoskeletal:      Comments: LLE wounds with dressing C/D/I   Skin:     General: Skin is warm and dry.   Neurological:      General: No focal deficit present.      Mental Status: He is alert and  oriented to person, place, and time.   Psychiatric:         Mood and Affect: Mood normal.          Significant Labs: All pertinent labs within the past 24 hours have been reviewed.    Significant Imaging: I have reviewed all pertinent imaging results/findings within the past 24 hours.

## 2024-03-12 LAB
ALBUMIN SERPL BCP-MCNC: 1.7 G/DL (ref 3.5–5.2)
ALP SERPL-CCNC: 69 U/L (ref 55–135)
ALT SERPL W/O P-5'-P-CCNC: 18 U/L (ref 10–44)
ANION GAP SERPL CALC-SCNC: 7 MMOL/L (ref 8–16)
AST SERPL-CCNC: 27 U/L (ref 10–40)
BASOPHILS # BLD AUTO: 0.04 K/UL (ref 0–0.2)
BASOPHILS NFR BLD: 0.5 % (ref 0–1.9)
BILIRUB SERPL-MCNC: 0.3 MG/DL (ref 0.1–1)
BUN SERPL-MCNC: 19 MG/DL (ref 8–23)
CALCIUM SERPL-MCNC: 8.2 MG/DL (ref 8.7–10.5)
CHLORIDE SERPL-SCNC: 105 MMOL/L (ref 95–110)
CO2 SERPL-SCNC: 28 MMOL/L (ref 23–29)
CREAT SERPL-MCNC: 1.4 MG/DL (ref 0.5–1.4)
DIFFERENTIAL METHOD BLD: ABNORMAL
EOSINOPHIL # BLD AUTO: 0.3 K/UL (ref 0–0.5)
EOSINOPHIL NFR BLD: 3.5 % (ref 0–8)
ERYTHROCYTE [DISTWIDTH] IN BLOOD BY AUTOMATED COUNT: 17.3 % (ref 11.5–14.5)
EST. GFR  (NO RACE VARIABLE): 52.4 ML/MIN/1.73 M^2
GLUCOSE SERPL-MCNC: 115 MG/DL (ref 70–110)
HCT VFR BLD AUTO: 24.9 % (ref 40–54)
HGB BLD-MCNC: 7.4 G/DL (ref 14–18)
IMM GRANULOCYTES # BLD AUTO: 0.19 K/UL (ref 0–0.04)
IMM GRANULOCYTES NFR BLD AUTO: 2.2 % (ref 0–0.5)
LYMPHOCYTES # BLD AUTO: 1.9 K/UL (ref 1–4.8)
LYMPHOCYTES NFR BLD: 22 % (ref 18–48)
MAGNESIUM SERPL-MCNC: 1.8 MG/DL (ref 1.6–2.6)
MCH RBC QN AUTO: 24.4 PG (ref 27–31)
MCHC RBC AUTO-ENTMCNC: 29.7 G/DL (ref 32–36)
MCV RBC AUTO: 82 FL (ref 82–98)
MONOCYTES # BLD AUTO: 0.7 K/UL (ref 0.3–1)
MONOCYTES NFR BLD: 8.2 % (ref 4–15)
NEUTROPHILS # BLD AUTO: 5.6 K/UL (ref 1.8–7.7)
NEUTROPHILS NFR BLD: 63.6 % (ref 38–73)
NRBC BLD-RTO: 0 /100 WBC
PHOSPHATE SERPL-MCNC: 2.9 MG/DL (ref 2.7–4.5)
PLATELET # BLD AUTO: 495 K/UL (ref 150–450)
PMV BLD AUTO: 9.5 FL (ref 9.2–12.9)
POCT GLUCOSE: 128 MG/DL (ref 70–110)
POCT GLUCOSE: 142 MG/DL (ref 70–110)
POCT GLUCOSE: 185 MG/DL (ref 70–110)
POCT GLUCOSE: 92 MG/DL (ref 70–110)
POTASSIUM SERPL-SCNC: 4.3 MMOL/L (ref 3.5–5.1)
PROT SERPL-MCNC: 6.2 G/DL (ref 6–8.4)
RBC # BLD AUTO: 3.03 M/UL (ref 4.6–6.2)
SODIUM SERPL-SCNC: 140 MMOL/L (ref 136–145)
WBC # BLD AUTO: 8.81 K/UL (ref 3.9–12.7)

## 2024-03-12 PROCEDURE — A4216 STERILE WATER/SALINE, 10 ML: HCPCS

## 2024-03-12 PROCEDURE — 63600175 PHARM REV CODE 636 W HCPCS: Performed by: PHYSICIAN ASSISTANT

## 2024-03-12 PROCEDURE — 27000207 HC ISOLATION

## 2024-03-12 PROCEDURE — 83735 ASSAY OF MAGNESIUM: CPT

## 2024-03-12 PROCEDURE — 20600001 HC STEP DOWN PRIVATE ROOM

## 2024-03-12 PROCEDURE — 36415 COLL VENOUS BLD VENIPUNCTURE: CPT

## 2024-03-12 PROCEDURE — 25000003 PHARM REV CODE 250: Performed by: INTERNAL MEDICINE

## 2024-03-12 PROCEDURE — 80053 COMPREHEN METABOLIC PANEL: CPT

## 2024-03-12 PROCEDURE — 25000003 PHARM REV CODE 250: Performed by: PHYSICIAN ASSISTANT

## 2024-03-12 PROCEDURE — 85025 COMPLETE CBC W/AUTO DIFF WBC: CPT

## 2024-03-12 PROCEDURE — 25000003 PHARM REV CODE 250

## 2024-03-12 PROCEDURE — 94761 N-INVAS EAR/PLS OXIMETRY MLT: CPT

## 2024-03-12 PROCEDURE — 99233 SBSQ HOSP IP/OBS HIGH 50: CPT | Mod: GC,,, | Performed by: INTERNAL MEDICINE

## 2024-03-12 PROCEDURE — 84100 ASSAY OF PHOSPHORUS: CPT

## 2024-03-12 RX ORDER — PERMETHRIN 50 MG/G
CREAM TOPICAL DAILY
Status: COMPLETED | OUTPATIENT
Start: 2024-03-12 | End: 2024-03-16

## 2024-03-12 RX ADMIN — ACETAMINOPHEN 650 MG: 325 TABLET ORAL at 01:03

## 2024-03-12 RX ADMIN — GABAPENTIN 300 MG: 300 CAPSULE ORAL at 09:03

## 2024-03-12 RX ADMIN — TAMSULOSIN HYDROCHLORIDE 0.4 MG: 0.4 CAPSULE ORAL at 09:03

## 2024-03-12 RX ADMIN — PERMETHRIN: 50 CREAM TOPICAL at 01:03

## 2024-03-12 RX ADMIN — Medication 10 ML: at 06:03

## 2024-03-12 RX ADMIN — APIXABAN 5 MG: 5 TABLET, FILM COATED ORAL at 09:03

## 2024-03-12 RX ADMIN — NIFEDIPINE 90 MG: 30 TABLET, FILM COATED, EXTENDED RELEASE ORAL at 09:03

## 2024-03-12 RX ADMIN — ERTAPENEM 1 G: 1 INJECTION INTRAMUSCULAR; INTRAVENOUS at 01:03

## 2024-03-12 RX ADMIN — INSULIN DETEMIR 30 UNITS: 100 INJECTION, SOLUTION SUBCUTANEOUS at 09:03

## 2024-03-12 RX ADMIN — INSULIN ASPART 10 UNITS: 100 INJECTION, SOLUTION INTRAVENOUS; SUBCUTANEOUS at 08:03

## 2024-03-12 RX ADMIN — INSULIN ASPART 10 UNITS: 100 INJECTION, SOLUTION INTRAVENOUS; SUBCUTANEOUS at 04:03

## 2024-03-12 RX ADMIN — CLOPIDOGREL BISULFATE 75 MG: 75 TABLET ORAL at 09:03

## 2024-03-12 RX ADMIN — Medication 10 ML: at 01:03

## 2024-03-12 RX ADMIN — ACETAMINOPHEN 650 MG: 325 TABLET ORAL at 09:03

## 2024-03-12 RX ADMIN — PANTOPRAZOLE SODIUM 40 MG: 40 TABLET, DELAYED RELEASE ORAL at 09:03

## 2024-03-12 RX ADMIN — INSULIN ASPART 10 UNITS: 100 INJECTION, SOLUTION INTRAVENOUS; SUBCUTANEOUS at 12:03

## 2024-03-12 RX ADMIN — ATORVASTATIN CALCIUM 40 MG: 40 TABLET, FILM COATED ORAL at 09:03

## 2024-03-12 NOTE — PT/OT/SLP PROGRESS
Physical Therapy      Patient Name:  Saji Castañeda   MRN:  [id number]    PT/OT co-treat attempt at 1411, however patient not seen today secondary to patient politely declining therapy services this date, citing 10/10 BLE pain. Increased time spent with patient to offer alternatives to EOB activities, including supine mobility & therapeutic exercise. Patient also refusing PT/OT assistance to reposition patient to midline positioning, due to therapists receiving patient with head & trunk significantly leaned to R.     Of note, RN (Yue) stating that patient received wound care services and partook in bed mobility & incidental P/AROM. Furthermore, patient's newly ordered immerse bed present in [facility] & will be transferred to it soon.    Patient Education Provided on:  The role of physical therapy and how the patient can benefit from skilled services  The negative effects of prolonged bed rest/sedentary behavior, along with the importance of OOB activity & patient participation with PT  Pt white board updated with current therapists name and level of mobility assistance needed.     Patient Verbalized understanding of all topics touched on this date. All patient questions answered within the PT scope of practice      Will follow-up at next appropriate date.

## 2024-03-12 NOTE — CONSULTS
[facility] - Telemetry Stepdown  Wound Care    Patient Name:  Saji Castañeda   MRN:  [id number]  Date: 3/12/2024  Diagnosis: Osteomyelitis of left lower extremity    History:     Past Medical History:   Diagnosis Date    Arthritis     legs    Bacteremia due to Gram-negative bacteria 3/23/2021    Diabetes mellitus     Diabetes mellitus, type 2     Hyperlipidemia     Hypertension     Osteomyelitis     Palliative care encounter 5/24/2023       Social History     Socioeconomic History    Marital status: Single   Tobacco Use    Smoking status: Never    Smokeless tobacco: Never   Substance and Sexual Activity    Alcohol use: No     Comment: occassional    Drug use: No    Sexual activity: Not Currently   Social History Narrative    Not currently working; lives with family     Social Determinants of Health     Financial Resource Strain: Medium Risk (3/5/2024)    Overall Financial Resource Strain (CARDIA)     Difficulty of Paying Living Expenses: Somewhat hard   Food Insecurity: No Food Insecurity (3/5/2024)    Hunger Vital Sign     Worried About Running Out of Food in the Last Year: Never true     Ran Out of Food in the Last Year: Never true   Transportation Needs: No Transportation Needs (3/5/2024)    PRAPARE - Transportation     Lack of Transportation (Medical): No     Lack of Transportation (Non-Medical): No   Physical Activity: Inactive (3/5/2024)    Exercise Vital Sign     Days of Exercise per Week: 0 days     Minutes of Exercise per Session: 0 min   Stress: No Stress Concern Present (3/5/2024)    [employer] [place] [employer] - Occupational Stress Questionnaire     Feeling of Stress : Only a little   Social Connections: Socially Isolated (3/5/2024)    Social Connection and Isolation Panel [NHANES]     Frequency of Communication with Friends and Family: More than three times a week     Frequency of Social Gatherings with Friends and Family: Patient declined     Attends Denominational Services: Never     Active Member  of Clubs or Organizations: No     Attends Club or Organization Meetings: Never     Marital Status: Never    Housing Stability: Low Risk  (3/5/2024)    Housing Stability Vital Sign     Unable to Pay for Housing in the Last Year: No     Number of Places Lived in the Last Year: 1     Unstable Housing in the Last Year: No       Precautions:     Allergies as of 03/05/2024    (No Known Allergies)       [facility] Assessment Details/Treatment     Patient seen for wound care consultation.   Reviewed chart for this encounter.   See Flow Sheet for findings.      RECOMMENDATIONS: MD consult for various wounds. Patient has been seen for bilateral lower extremities by podiatry. Podiatry is following with wound care orders.    Patient seen for sacrum, right back, and left greater trochanter. Left greater trochanter wound noted wo have slough with viable pink tissue. No current drainage. Cleanse with vashe for antimicrobial properties. Pack with vashe soaked gauze and cover with mepilex foam border dressing. Change BID and PRN if soiled.    Sacral wounds noted on admission per chart review. Various areas of eschar and viable pink tissue. No active drainage. Cleanse with vashe for antimicrobial properties and pat dry. Apply vashe soaked gauze to sacrum and bilateral buttocks. Cover with ABD pad. May secure with medipore tape. Change BID and PRN if soiled.     Right back wound with healthy, pink viable tissue noted. Scant serosanguineous drainage. Cleanse with vashe for antimicrobial properties and pat dry. Cover with mepilex foam border dressing. Change every three days or sooner if soiled.    Immerse bed was ordered for microclimate and pressure redistribution. No other needs or concerns at this time. Will follow up 3/19/2024 or sooner if needed.    Discussed POC with patient and primary nurse.   See EMR for orders & patient education.    Discussed nutrition and the role of protein in wound healing with the patient. Instructed  patient to optimize protein for wound healing.    Bedside nursing to continue care & monitoring.  Bedside nursing to maintain pressure injury prevention interventions.            03/12/24 0935   WOCN Assessment   WOCN Total Time (mins) 45   Visit Date 03/12/24   Visit Time 0935   Consult Type New   WOCN Speciality Wound   Intervention assessed;changed;applied;chart review;coordination of care;orders   Teaching on-going        Altered Skin Integrity 03/05/24 0949 Left Buttocks #6 Ulceration Partial thickness tissue loss. Shallow open ulcer with a red or pink wound bed, without slough. Intact or Open/Ruptured Serum-filled blister.   Date First Assessed/Time First Assessed: 03/05/24 0949   Altered Skin Integrity Present on Admission - Did Patient arrive to the hospital with altered skin?: yes  Side: Left  Location: Buttocks  Wound Number: #6  Primary Wound Type: Ulceration  Descri...   Wound Image    Dressing Appearance Open to air;Dry;Intact;Clean   Drainage Amount None   Drainage Characteristics/Odor No odor   Appearance Pink;Red;Gray   Care Cleansed with:;Antimicrobial agent   Dressing Change Due 03/12/24        Altered Skin Integrity 03/06/24 1000 Left dorsal Greater trochanter   Date First Assessed/Time First Assessed: 03/06/24 1000   Altered Skin Integrity Present on Admission - Did Patient arrive to the hospital with altered skin?: yes  Side: Left  Orientation: dorsal  Location: Greater trochanter  Is this injury device rel...   Wound Image    Dressing Appearance Open to air;Dry   Drainage Amount None   Drainage Characteristics/Odor No odor   Appearance Pink;Red;Tan   Care Cleansed with:;Antimicrobial agent   Dressing Change Due 03/12/24        Altered Skin Integrity 03/12/24 0935 Right Back   Date First Assessed/Time First Assessed: 03/12/24 0935   Altered Skin Integrity Present on Admission - Did Patient arrive to the hospital with altered skin?: yes  Side: Right  Location: Back  Is this injury device  related?: No   Wound Image    Dressing Appearance Open to air   Drainage Amount None   Drainage Characteristics/Odor No odor   Appearance Pink;Red   Care Cleansed with:;Antimicrobial agent   Dressing Applied;Foam   Dressing Change Due 03/12/24       Orders placed.   Trip Yarbrough RN  03/12/2024

## 2024-03-12 NOTE — PLAN OF CARE
PT alert and oriented x 4. Able to voice all wants and needs. All needs met.  He has remained free of falls and injuries. Safety eduction and plan of care reviewed with PT and he voiced understanding. Bed is low and locked with call light with in easy reach.  Bed alarm set

## 2024-03-12 NOTE — PROGRESS NOTES
[facility] - Telemetry [facility] Medicine  Progress Note    Patient Name: Saji Castañeda  MRN: [id number]  Patient Class: IP- Inpatient   Admission Date: 3/5/2024  Length of Stay: 7 days  Attending Physician: Seymour Cullen MD  Primary Care Provider: [facility] -        Subjective:     Principal Problem:Osteomyelitis of left lower extremity        HPI:  Saji Csatañeda is a 74 y.o. male with a medical history of DM2, HLD, HTN, chronic osteomyelitis of L heel, L TMA, PAD, hx of ESBL klebsiella, acinetobacter bacteremia, presenting with hyperglycemia. He presented to the ED via EMS and his POCT glucose on arrival was >500. Serum blood glucose 667 with anion gap of 17 and elevated ketones. Serum potassium 3.4, corrected sodium 149. Urinalysis significant for RBCs, glucosuria, and moderate bacteria and yeast. CBC revealed leukocytosis, elevated platelets, and mild anemia. CMP shows Na 135, K 3.4, BUN 29, Cr 2.1, Glucose 677, Lactate 4.61. BNP and troponin elevated. GFR 32.2. Insulin drip, IV fluids, zosyn, vancomycin, and potassium given.      He is unable to provide much history, but states that he has not been taking his home medications for at least a week. He reports shortness of breath, fatigue, and weakness for the last 6-7 days. He has chills and reports episodes of emesis. He denies abdominal pain, chest pain, palpitations, recent illness/infection, fevers, changes to bowel movements and urinary output. Patient lives with his brother and sister at home. He was last seen in the ED on 2/21 after a fall. He was hypoglycemic BGL 60 at that time which returned to normal after eating. He was also scheduled for a wound clinic appointment today 3/5 at [facility] with Dr. Wilson.        Overview/Hospital Course:  Wound care and cultures collected by Podiatry with recs for Vascular consult for amputation. Patient declined amputation. ID recommending IV Ertapenem for chronic osteomyelitis for 6 week duration  (EED: 4/15/24). With high wound care needs and IV antibiotics plan patient needing long-term placement. He was treated with topical permethrin for bedbugs. Wound care has been dressing the wounds.    Interval History: REMINGTON DOMINGUEZ    Review of Systems    Constitutional:  Negative for activity change, appetite change and fever.   Respiratory:  Negative for cough, shortness of breath and wheezing.    Cardiovascular:  Negative for chest pain and leg swelling.   Gastrointestinal:  Negative for abdominal pain, constipation, diarrhea, nausea and vomiting.   Musculoskeletal:  Positive for arthralgias and myalgias.   Skin:  Negative for rash.   Neurological:  Negative for headaches.     Objective:     Vital Signs (Most Recent):  Temp: 97 °F (36.1 °C) (03/12/24 0740)  Pulse: (!) 53 (03/12/24 0740)  Resp: 19 (03/12/24 0740)  BP: 135/60 (03/12/24 0740)  SpO2: 96 % (03/12/24 0740) Vital Signs (24h Range):  Temp:  [97 °F (36.1 °C)-99.7 °F (37.6 °C)] 97 °F (36.1 °C)  Pulse:  [53-96] 53  Resp:  [18-23] 19  SpO2:  [94 %-97 %] 96 %  BP: (111-142)/(40-60) 135/60     Weight: 120.2 kg (265 lb)  Body mass index is 39.13 kg/m².    Intake/Output Summary (Last 24 hours) at 3/12/2024 0726  Last data filed at 3/12/2024 0454  Gross per 24 hour   Intake 1000 ml   Output 1350 ml   Net -350 ml         Physical Exam      Vitals and nursing note reviewed.   Constitutional:       Appearance: He is obese.   HENT:      Head: Normocephalic and atraumatic.   Eyes:      Conjunctiva/sclera: Conjunctivae normal.      Pupils: Pupils are equal, round, and reactive to light.   Cardiovascular:      Rate and Rhythm: Normal rate and regular rhythm.      Pulses: Normal pulses.   Pulmonary:      Effort: Pulmonary effort is normal. No respiratory distress.      Breath sounds: No wheezing or rales.   Abdominal:      Palpations: Abdomen is soft.      Tenderness: There is no abdominal tenderness. There is no guarding.   Musculoskeletal:      Comments: LLE wounds with  "dressing C/D/I   Skin:     General: Skin is warm and dry.   Neurological:      General: No focal deficit present.      Mental Status: He is alert and oriented to person, place, and time.   Psychiatric:         Mood and Affect: Mood normal.     Significant Labs: All pertinent labs within the past 24 hours have been reviewed.    Significant Imaging: I have reviewed all pertinent imaging results/findings within the past 24 hours.    Assessment/Plan:      * Osteomyelitis of left lower extremity  Patient with Proteus on leg cultures collected by Podiatry with concern for acute on chronic osteo.    - IV ertapenem with end date 4/15/24   - midline catheters placed on admission for difficult IV access  -Still thinking about the amputation recommended by vascular surgery    History of Bedbug bite with infection  Patient started on Permethrin daily for 3 days starting 3/9/2024      Chronic anticoagulation  - See DVT      History of amputation of left high mid foot   See osteo      Stage 3b chronic kidney disease  Creatine stable for now. BMP reviewed- noted Estimated Creatinine Clearance: 54.5 mL/min (A) (based on SCr of 1.5 mg/dL (H)). according to latest data. Based on current GFR, CKD stage is stage 3 - GFR 30-59.  Monitor UOP and serial BMP and adjust therapy as needed. Renally dose meds. Avoid nephrotoxic medications and procedures.    Severe sepsis  This patient does have evidence of infective focus  My overall impression is sepsis.  Source: Skin and Soft Tissue (location left leg)  Antibiotics given-   Antibiotics (72h ago, onward)      Start     Stop Route Frequency Ordered    03/08/24 1230  ertapenem (INVANZ) 1 g in sodium chloride 0.9 % 100 mL IVPB (MB+)         -- IV Every 24 hours (non-standard times) 03/08/24 1115          Latest lactate reviewed-  No results for input(s): "LACTATE", "POCLAC" in the last 72 hours.    Organ dysfunction indicated by Acute kidney injury    Fluid challenge Actual Body weight- Patient " will receive 30ml/kg actual body weight to calculate fluid bolus for treatment of septic shock.     Post- resuscitation assessment No - Post resuscitation assessment not needed       Will Not start Pressors-     - See osteo      Other hyperlipidemia  Continue home atorvastatin      Diabetic ketoacidosis without coma associated with type 2 diabetes mellitus  Patient's FSGs are uncontrolled due to hyperglycemia on current medication regimen.  Last A1c reviewed-   Lab Results   Component Value Date    HGBA1C >14.0 (H) 03/05/2024     Most recent fingerstick glucose reviewed-   Recent Labs   Lab 03/11/24  0807 03/11/24  1153 03/11/24  1706 03/11/24  2208   POCTGLUCOSE 208* 235* 72 145*       Current correctional scale   Insulin drip  Maintain anti-hyperglycemic dose as follows-   Antihyperglycemics (From admission, onward)      Start     Stop Route Frequency Ordered    03/11/24 1521  insulin aspart U-100 pen 0-5 Units         -- SubQ Before meals & nightly PRN 03/11/24 1520    03/07/24 1130  insulin aspart U-100 pen 10 Units         -- SubQ 3 times daily with meals 03/07/24 1028    03/05/24 1800  insulin detemir U-100 (Levemir) pen 30 Units         -- SubQ Nightly 03/05/24 1713          Hold Oral hypoglycemics while patient is in the hospital.  Will keep patient on DKA pathway with insulin drip and fluid with protocol in place for switching to subcutaneous insulin as anion gap closes.      DKA  HHS  - Resolved  - Continue insulin regimen and BG checks TIDWM and QHS      Paroxysmal atrial fibrillation  Patient with Persistent (7 days or more) atrial fibrillation which is uncontrolled currently with    . Patient is currently in sinus rhythm.[id number] Score: 4. . Anticoagulation indicated. Anticoagulation done with lovenox as we could not get IV access for heparin  .    -Restart plavix and apixaban    Chronic deep vein thrombosis (DVT) of right upper extremity  - Continue home AC      Cellulitis of left lower leg  See  osteo      Peripheral arterial disease  Resume plavix      Essential hypertension  Chronic, uncontrolled. Latest blood pressure and vitals reviewed-     Temp:  [97 °F (36.1 °C)-99.7 °F (37.6 °C)]   Pulse:  [53-96]   Resp:  [18-23]   BP: (111-142)/(40-60)   SpO2:  [94 %-96 %] .   Home meds for hypertension were reviewed and noted below.   Hypertension Medications               furosemide (LASIX) 40 MG tablet Take 20 mg by mouth.    hydrALAZINE (APRESOLINE) 100 MG tablet Take 1 tablet (100 mg total) by mouth every 8 (eight) hours.    isosorbide dinitrate (ISORDIL) 10 MG tablet Take 1 tablet (10 mg total) by mouth 3 (three) times daily.    NIFEdipine (PROCARDIA-XL) 60 MG (OSM) 24 hr tablet Take 1 tablet (60 mg total) by mouth once daily.            While in the hospital, will manage blood pressure as follows; Adjust home antihypertensive regimen as follows- will keep hydralazine but hold valsartan and lasix    Will utilize p.r.n. blood pressure medication only if patient's blood pressure greater than 180/110 and he develops symptoms such as worsening chest pain or shortness of breath.      VTE Risk Mitigation (From admission, onward)           Ordered     apixaban tablet 5 mg  2 times daily         03/07/24 1810                    Discharge Planning   OXANA: 3/13/2024     Code Status: Full Code   Is the patient medically ready for discharge?: No    Reason for patient still in hospital (select all that apply): Treatment  Discharge Plan A: Skilled Nursing Facility   Discharge Delays: None known at this time              Lynette Perkins MD  Department of Hospital Medicine   [facility] - Telemetry Stepdown

## 2024-03-12 NOTE — ASSESSMENT & PLAN NOTE
Patient with Persistent (7 days or more) atrial fibrillation which is uncontrolled currently with    . Patient is currently in sinus rhythm.[id number] Score: 4. . Anticoagulation indicated. Anticoagulation done with lovenox as we could not get IV access for heparin  .    -Restart plavix and apixaban

## 2024-03-12 NOTE — PLAN OF CARE
Problem: Adult Inpatient Plan of Care  Goal: Plan of Care Review  Outcome: Ongoing, Not Progressing  Goal: Patient-Specific Goal (Individualized)  Outcome: Ongoing, Not Progressing  Goal: Absence of Hospital-Acquired Illness or Injury  Outcome: Ongoing, Not Progressing  Goal: Optimal Comfort and Wellbeing  Outcome: Ongoing, Not Progressing  Goal: Readiness for Transition of Care  Outcome: Ongoing, Not Progressing     Problem: Diabetes Comorbidity  Goal: Blood Glucose Level Within Targeted Range  Outcome: Ongoing, Not Progressing     Problem: Adjustment to Illness (Sepsis/Septic Shock)  Goal: Optimal Coping  Outcome: Ongoing, Not Progressing     Problem: Bleeding (Sepsis/Septic Shock)  Goal: Absence of Bleeding  Outcome: Ongoing, Not Progressing     Problem: Glycemic Control Impaired (Sepsis/Septic Shock)  Goal: Blood Glucose Level Within Desired Range  Outcome: Ongoing, Not Progressing     Problem: Infection Progression (Sepsis/Septic Shock)  Goal: Absence of Infection Signs and Symptoms  Outcome: Ongoing, Not Progressing     Problem: Nutrition Impaired (Sepsis/Septic Shock)  Goal: Optimal Nutrition Intake  Outcome: Ongoing, Not Progressing     Problem: Infection  Goal: Absence of Infection Signs and Symptoms  Outcome: Ongoing, Not Progressing   Patient moved to specialty bed with wound care today per wound care orders.

## 2024-03-12 NOTE — PT/OT/SLP PROGRESS
Occupational Therapy      Patient Name:  Saji Castañeda   MRN:  [id number]    Pt attempted at 1411.    Patient not seen today secondary to pt refusing therapy .Pt reported 10/10 pain in B LE. Pt offered many different options to encourage pt to participate, but pt refused again. Pt educated on importance of OOB activity to maintain strength and promote overall recovery. Pt was informed that OT will re-attempt tomorrow and was encouraged to participate tomorrow since he is requesting today off. OT will follow-up per POC.      3/12/2024

## 2024-03-12 NOTE — ASSESSMENT & PLAN NOTE
Chronic, uncontrolled. Latest blood pressure and vitals reviewed-     Temp:  [97 °F (36.1 °C)-99.7 °F (37.6 °C)]   Pulse:  [53-96]   Resp:  [18-23]   BP: (111-142)/(40-60)   SpO2:  [94 %-96 %] .   Home meds for hypertension were reviewed and noted below.   Hypertension Medications               furosemide (LASIX) 40 MG tablet Take 20 mg by mouth.    hydrALAZINE (APRESOLINE) 100 MG tablet Take 1 tablet (100 mg total) by mouth every 8 (eight) hours.    isosorbide dinitrate (ISORDIL) 10 MG tablet Take 1 tablet (10 mg total) by mouth 3 (three) times daily.    NIFEdipine (PROCARDIA-XL) 60 MG (OSM) 24 hr tablet Take 1 tablet (60 mg total) by mouth once daily.            While in the hospital, will manage blood pressure as follows; Adjust home antihypertensive regimen as follows- will keep hydralazine but hold valsartan and lasix    Will utilize p.r.n. blood pressure medication only if patient's blood pressure greater than 180/110 and he develops symptoms such as worsening chest pain or shortness of breath.

## 2024-03-12 NOTE — NURSING
Nurses Note -- 4 Eyes      3/12/2024   0705 AM      Skin assessed during: Q Shift Change      [] No Altered Skin Integrity Present    []Prevention Measures Documented      [x] Yes- Altered Skin Integrity Present or Discovered   [] LDA Added if Not in Epic (Describe Wound)   [] New Altered Skin Integrity was Present on Admit and Documented in LDA   [] Wound Image Taken    Wound Care Consulted? Yes    Attending Nurse:  Yue Rehman RN/Staff Member:   Ara CUBA

## 2024-03-12 NOTE — SUBJECTIVE & OBJECTIVE
Interval History: REMINGTON DOMINGUEZ    Review of Systems    Constitutional:  Negative for activity change, appetite change and fever.   Respiratory:  Negative for cough, shortness of breath and wheezing.    Cardiovascular:  Negative for chest pain and leg swelling.   Gastrointestinal:  Negative for abdominal pain, constipation, diarrhea, nausea and vomiting.   Musculoskeletal:  Positive for arthralgias and myalgias.   Skin:  Negative for rash.   Neurological:  Negative for headaches.     Objective:     Vital Signs (Most Recent):  Temp: 97 °F (36.1 °C) (03/12/24 0740)  Pulse: (!) 53 (03/12/24 0740)  Resp: 19 (03/12/24 0740)  BP: 135/60 (03/12/24 0740)  SpO2: 96 % (03/12/24 0740) Vital Signs (24h Range):  Temp:  [97 °F (36.1 °C)-99.7 °F (37.6 °C)] 97 °F (36.1 °C)  Pulse:  [53-96] 53  Resp:  [18-23] 19  SpO2:  [94 %-97 %] 96 %  BP: (111-142)/(40-60) 135/60     Weight: 120.2 kg (265 lb)  Body mass index is 39.13 kg/m².    Intake/Output Summary (Last 24 hours) at 3/12/2024 0764  Last data filed at 3/12/2024 0454  Gross per 24 hour   Intake 1000 ml   Output 1350 ml   Net -350 ml         Physical Exam      Vitals and nursing note reviewed.   Constitutional:       Appearance: He is obese.   HENT:      Head: Normocephalic and atraumatic.   Eyes:      Conjunctiva/sclera: Conjunctivae normal.      Pupils: Pupils are equal, round, and reactive to light.   Cardiovascular:      Rate and Rhythm: Normal rate and regular rhythm.      Pulses: Normal pulses.   Pulmonary:      Effort: Pulmonary effort is normal. No respiratory distress.      Breath sounds: No wheezing or rales.   Abdominal:      Palpations: Abdomen is soft.      Tenderness: There is no abdominal tenderness. There is no guarding.   Musculoskeletal:      Comments: LLE wounds with dressing C/D/I   Skin:     General: Skin is warm and dry.   Neurological:      General: No focal deficit present.      Mental Status: He is alert and oriented to person, place, and time.   Psychiatric:          Mood and Affect: Mood normal.     Significant Labs: All pertinent labs within the past 24 hours have been reviewed.    Significant Imaging: I have reviewed all pertinent imaging results/findings within the past 24 hours.

## 2024-03-12 NOTE — ASSESSMENT & PLAN NOTE
Patient's FSGs are uncontrolled due to hyperglycemia on current medication regimen.  Last A1c reviewed-   Lab Results   Component Value Date    HGBA1C >14.0 (H) 03/05/2024     Most recent fingerstick glucose reviewed-   Recent Labs   Lab 03/11/24  0807 03/11/24  1153 03/11/24  1706 03/11/24  2208   POCTGLUCOSE 208* 235* 72 145*       Current correctional scale   Insulin drip  Maintain anti-hyperglycemic dose as follows-   Antihyperglycemics (From admission, onward)    Start     Stop Route Frequency Ordered    03/11/24 1521  insulin aspart U-100 pen 0-5 Units         -- SubQ Before meals & nightly PRN 03/11/24 1520    03/07/24 1130  insulin aspart U-100 pen 10 Units         -- SubQ 3 times daily with meals 03/07/24 1028    03/05/24 1800  insulin detemir U-100 (Levemir) pen 30 Units         -- SubQ Nightly 03/05/24 1713        Hold Oral hypoglycemics while patient is in the hospital.  Will keep patient on DKA pathway with insulin drip and fluid with protocol in place for switching to subcutaneous insulin as anion gap closes.      DKA  HHS  - Resolved  - Continue insulin regimen and BG checks TIDWM and QHS

## 2024-03-13 LAB
POCT GLUCOSE: 187 MG/DL (ref 70–110)
POCT GLUCOSE: 260 MG/DL (ref 70–110)
POCT GLUCOSE: 341 MG/DL (ref 70–110)

## 2024-03-13 PROCEDURE — 93005 ELECTROCARDIOGRAM TRACING: CPT

## 2024-03-13 PROCEDURE — 63600175 PHARM REV CODE 636 W HCPCS

## 2024-03-13 PROCEDURE — 25000003 PHARM REV CODE 250: Performed by: PHYSICIAN ASSISTANT

## 2024-03-13 PROCEDURE — 97535 SELF CARE MNGMENT TRAINING: CPT

## 2024-03-13 PROCEDURE — 99232 SBSQ HOSP IP/OBS MODERATE 35: CPT | Mod: GC,,, | Performed by: INTERNAL MEDICINE

## 2024-03-13 PROCEDURE — 97530 THERAPEUTIC ACTIVITIES: CPT

## 2024-03-13 PROCEDURE — 93010 ELECTROCARDIOGRAM REPORT: CPT | Mod: ,,, | Performed by: INTERNAL MEDICINE

## 2024-03-13 PROCEDURE — 20600001 HC STEP DOWN PRIVATE ROOM

## 2024-03-13 PROCEDURE — 25000003 PHARM REV CODE 250

## 2024-03-13 PROCEDURE — 97110 THERAPEUTIC EXERCISES: CPT

## 2024-03-13 PROCEDURE — 99233 SBSQ HOSP IP/OBS HIGH 50: CPT | Mod: ,,, | Performed by: PODIATRIST

## 2024-03-13 PROCEDURE — 63600175 PHARM REV CODE 636 W HCPCS: Performed by: PHYSICIAN ASSISTANT

## 2024-03-13 PROCEDURE — 94761 N-INVAS EAR/PLS OXIMETRY MLT: CPT

## 2024-03-13 PROCEDURE — 25000003 PHARM REV CODE 250: Performed by: INTERNAL MEDICINE

## 2024-03-13 PROCEDURE — 27000207 HC ISOLATION

## 2024-03-13 PROCEDURE — A4216 STERILE WATER/SALINE, 10 ML: HCPCS

## 2024-03-13 RX ADMIN — INSULIN ASPART 4 UNITS: 100 INJECTION, SOLUTION INTRAVENOUS; SUBCUTANEOUS at 05:03

## 2024-03-13 RX ADMIN — Medication 10 ML: at 12:03

## 2024-03-13 RX ADMIN — APIXABAN 5 MG: 5 TABLET, FILM COATED ORAL at 09:03

## 2024-03-13 RX ADMIN — TAMSULOSIN HYDROCHLORIDE 0.4 MG: 0.4 CAPSULE ORAL at 08:03

## 2024-03-13 RX ADMIN — INSULIN ASPART 10 UNITS: 100 INJECTION, SOLUTION INTRAVENOUS; SUBCUTANEOUS at 05:03

## 2024-03-13 RX ADMIN — PANTOPRAZOLE SODIUM 40 MG: 40 TABLET, DELAYED RELEASE ORAL at 08:03

## 2024-03-13 RX ADMIN — INSULIN ASPART 10 UNITS: 100 INJECTION, SOLUTION INTRAVENOUS; SUBCUTANEOUS at 01:03

## 2024-03-13 RX ADMIN — GABAPENTIN 300 MG: 300 CAPSULE ORAL at 09:03

## 2024-03-13 RX ADMIN — ACETAMINOPHEN 650 MG: 325 TABLET ORAL at 02:03

## 2024-03-13 RX ADMIN — ATORVASTATIN CALCIUM 40 MG: 40 TABLET, FILM COATED ORAL at 08:03

## 2024-03-13 RX ADMIN — CLOPIDOGREL BISULFATE 75 MG: 75 TABLET ORAL at 08:03

## 2024-03-13 RX ADMIN — Medication 10 ML: at 06:03

## 2024-03-13 RX ADMIN — APIXABAN 5 MG: 5 TABLET, FILM COATED ORAL at 08:03

## 2024-03-13 RX ADMIN — INSULIN DETEMIR 30 UNITS: 100 INJECTION, SOLUTION SUBCUTANEOUS at 09:03

## 2024-03-13 RX ADMIN — ERTAPENEM 1 G: 1 INJECTION INTRAMUSCULAR; INTRAVENOUS at 01:03

## 2024-03-13 RX ADMIN — INSULIN ASPART 10 UNITS: 100 INJECTION, SOLUTION INTRAVENOUS; SUBCUTANEOUS at 10:03

## 2024-03-13 RX ADMIN — NIFEDIPINE 90 MG: 30 TABLET, FILM COATED, EXTENDED RELEASE ORAL at 08:03

## 2024-03-13 RX ADMIN — PERMETHRIN: 50 CREAM TOPICAL at 08:03

## 2024-03-13 RX ADMIN — GABAPENTIN 300 MG: 300 CAPSULE ORAL at 08:03

## 2024-03-13 NOTE — ASSESSMENT & PLAN NOTE
Patient with Proteus and enterobacter on leg cultures collected by Podiatry with concern for acute on chronic osteo. Fungal culture grew fusarium    - IV ertapenem with end date 4/15/24   - will likely need voriconazole for fusarium coverage which grew on fungal culture, awaiting EKG for qtc  - midline catheters placed on admission for difficult IV access  -Still thinking about the amputation recommended by vascular surgery

## 2024-03-13 NOTE — PLAN OF CARE
PT alert and oriented x 4. Able to voice all wants and needs. All needs met.   Dressing to midline in BUE changed per protocol. He has remained free of falls and injuries. Safety eduction and plan of care reviewed with PT and he voiced understanding. Bed is low and locked with call light with in easy reach.

## 2024-03-13 NOTE — PT/OT/SLP PROGRESS
Occupational Therapy   Co - Treatment with PT    Name: Saji Castañeda  MRN: [id number]  Admitting Diagnosis:  Osteomyelitis of left lower extremity       Recommendations:     Discharge Recommendations: Moderate Intensity Therapy  Discharge Equipment Recommendations:  hospital bed, lift device  Barriers to discharge:   requires increased skilled A    Assessment:     Saji Castañeda is a 74 y.o. male with a medical diagnosis of Osteomyelitis of left lower extremity.  He presents with  performance deficits affecting function are weakness, impaired endurance, impaired sensation, impaired self care skills, impaired balance, pain, edema, impaired skin, decreased lower extremity function, decreased ROM, decreased upper extremity function, impaired fine motor.     Pt engaged fairly in therapy this date, limited by pain from wound on buttocks and SOB upon exertion during TherEx needing rest breaks. Pt attempted to sit EOB with total A x2 with pt attempting to move RLE but needing significant assistance. During initial transfer to EOB sitting, pt asked for seated rest break in long sitting with legs not off bed 2* SOB.  Pt's O2 measured >89%, and session resumed when pt ready after ~3min.  Attempt to assist pt to EOB progressed though pt in significant pain during 2nd attempt, asked for another ~10min rest break with legs in long sitting off EOB and shoulders leaning to R against raised HOB. After ~3min with O2 measuring ~92%, pt returned to sitting HOB raised and completed TherEx for BUE/BLE. Significant time required this date for bed mobility, preparing pt for mobility, and TherEx.  Pt requires additional assistance for bed mobility. Pt on track for post acute care at level of moderate intensity.     Rehab Prognosis:  Fair; patient would benefit from acute skilled OT services to address these deficits and reach maximum level of function.       Plan:     Patient to be seen 4 x/week to address the above listed problems via  "self-care/home management, therapeutic activities, therapeutic exercises, neuromuscular re-education  Plan of Care Expires: 04/06/24  Plan of Care Reviewed with: patient    Subjective     Chief Complaint: "My back itches"   Patient/Family Comments/goals: Get better, return to PLOF  Pain/Comfort:  Pain Rating 1: 10/10  Location - Side 1: Left  Location - Orientation 1: lower  Location 1: gluteal (wound)  Pain Addressed 1: Reposition, Distraction, Nurse notified  Pain Rating Post-Intervention 1:  (not quantified)    Objective:     Communicated with: RN prior to session.  Patient found HOB elevated with telemetry, PRAFO upon OT entry to room.    General Precautions: Standard, fall, contact, diabetic    Orthopedic Precautions:LLE non weight bearing, RLE weight bearing as tolerated (RLE Darco shoe)  Braces:  (Darco)  Respiratory Status: Room air     Occupational Performance:     Bed Mobility:    Patient completed Rolling/Turning to Left with  total assistance  Patient completed Rolling/Turning to Right with total assistance  Patient completed Scooting/Bridging  To EOB with total assistance and 2 persons while seated and R lean against raised HOB  To HOB while supine with total a x2  Patient completed Supine to Sit with total assistance and 2 persons  Patient completed Sit to Supine with total assistance and *2 persons   Pt demo'd poor EOB balance, needing to lean to the R against raised HOB    Functional Mobility/Transfers:  - Not attempted due to safety/pain    Activities of Daily Living:  Grooming: maximal assistance rubbing lotion on back and neck per pt request while PT measured blood O2, CGA to wash head/neck with washcloth   Upper Body Dressing: moderate assistance doffing soiled gown, donning fresh gown   Lower Body Dressing: total assistance doffing/donning PRAFO and DARCO    Therapeutic Exercises:   - BUE: 10 arm raises  - BUE: 10 chest presses  TherEx written on board    PT completed BLE TherEx, please refer to " note.     [facility] 6 Click ADL: 12    Treatment & Education:  Pt educated on role of OT, POC, and goals for therapy.    POC was dicussed with patient/caregiver, who was included in its development and is in agreement with the identified goals and treatment plan.   Patient and family aware of patient's deficits and therapy progression.   Time provided for therapeutic counseling and discussion of health disposition.   Educated on importance of EOB/OOB mobility, maintaining routine, sitting up in chair, and maximizing independence with ADLs during admission   Pt completed ADLs and bed mobility for treatment session as noted above   Pt/caregiver verbalized understanding and expressed no further concerns/questions.  Updated communication board with level of assist required       Patient left HOB elevated with all lines intact, call button in reach, and RN notified    GOALS:   Multidisciplinary Problems       Occupational Therapy Goals          Problem: Occupational Therapy    Goal Priority Disciplines Outcome Interventions   Occupational Therapy Goal     OT, PT/OT Ongoing, Progressing    Description: Goals to be met by: 4/6/24     Patient will increase functional independence with ADLs by performing:    UE Dressing with Contact Guard Assistance.  LE Dressing with Maximum Assistance.  Grooming while EOB with Stand-by Assistance.  Toileting from bedside commode with Maximum Assistance for hygiene and clothing management.   Sitting at edge of bed x5 minutes with Stand-by Assistance.  Rolling to Bilateral with Minimal Assistance.   Supine to sit with Minimal Assistance.  Stand pivot transfers with Minimal Assistance.  Toilet transfer to bedside commode with Maximum Assistance.  BUE strengthening exercise program 2x day with theraband/HEP worksheet                         Time Tracking:     OT Date of Treatment: 03/13/24  OT Start Time: 1518  OT Stop Time: 1601  OT Total Time (min): 43 min    Billable Minutes:Self Care/Home  Management 30  Therapeutic Exercise 13    OT/KATHLEEN: OT          3/13/2024

## 2024-03-13 NOTE — PLAN OF CARE
Pt with no accepting SNF facilities at this time.  Referral sent to [facility] for review due to IV abx and wound care.  Will continue to follow for d/c needs.    12:00pm Update:  Pt has been accepted by [facility], submitted for auth    Manuelito Hearn RN CM  Case Management  [id number]

## 2024-03-13 NOTE — ASSESSMENT & PLAN NOTE
Chronic, uncontrolled. Latest blood pressure and vitals reviewed-     Temp:  [98.2 °F (36.8 °C)-99.5 °F (37.5 °C)]   Pulse:  [45-60]   Resp:  [17-19]   BP: (130-166)/(52-63)   SpO2:  [94 %-99 %] .   Home meds for hypertension were reviewed and noted below.   Hypertension Medications               furosemide (LASIX) 40 MG tablet Take 20 mg by mouth.    hydrALAZINE (APRESOLINE) 100 MG tablet Take 1 tablet (100 mg total) by mouth every 8 (eight) hours.    isosorbide dinitrate (ISORDIL) 10 MG tablet Take 1 tablet (10 mg total) by mouth 3 (three) times daily.    NIFEdipine (PROCARDIA-XL) 60 MG (OSM) 24 hr tablet Take 1 tablet (60 mg total) by mouth once daily.            While in the hospital, will manage blood pressure as follows; Adjust home antihypertensive regimen as follows- will keep hydralazine but hold valsartan and lasix    Will utilize p.r.n. blood pressure medication only if patient's blood pressure greater than 180/110 and he develops symptoms such as worsening chest pain or shortness of breath.

## 2024-03-13 NOTE — ASSESSMENT & PLAN NOTE
Patient's FSGs are uncontrolled due to hyperglycemia on current medication regimen.  Last A1c reviewed-   Lab Results   Component Value Date    HGBA1C >14.0 (H) 03/05/2024     Most recent fingerstick glucose reviewed-   Recent Labs   Lab 03/12/24  1641 03/12/24  2103 03/13/24  0942 03/13/24  1235   POCTGLUCOSE 185* 128* 187* 260*       Current correctional scale   Insulin drip  Maintain anti-hyperglycemic dose as follows-   Antihyperglycemics (From admission, onward)    Start     Stop Route Frequency Ordered    03/11/24 1521  insulin aspart U-100 pen 0-5 Units         -- SubQ Before meals & nightly PRN 03/11/24 1520    03/07/24 1130  insulin aspart U-100 pen 10 Units         -- SubQ 3 times daily with meals 03/07/24 1028    03/05/24 1800  insulin detemir U-100 (Levemir) pen 30 Units         -- SubQ Nightly 03/05/24 1713        Hold Oral hypoglycemics while patient is in the hospital.  Will keep patient on DKA pathway with insulin drip and fluid with protocol in place for switching to subcutaneous insulin as anion gap closes.      DKA  HHS  - Resolved  - Continue insulin regimen and BG checks TIDWM and QHS

## 2024-03-13 NOTE — PROGRESS NOTES
[facility] - Telemetry Stepdown  Podiatry  Progress Note    Patient Name: Saji Castañeda  MRN: [id number]  Admission Date: 3/5/2024  Hospital Length of Stay: 8 days  Attending Physician: Seymour Cullen MD  Primary Care Provider: [facility] -     Subjective:     Interval History: NAEON, NAD, Slight bradycardia, slight hypertension. Patient is afebrile, dressings are somewhat soaked on distal wound.  Patient still deciding on amputation or not.    New pedal complaints: None  Denies post op fever.          Scheduled Meds:   apixaban  5 mg Oral BID    atorvastatin  40 mg Oral Daily    clopidogreL  75 mg Oral Daily    ertapenem (INVanz) IV (PEDS and ADULTS)  1 g Intravenous Q24H    gabapentin  300 mg Oral BID    insulin aspart U-100  10 Units Subcutaneous TIDWM    insulin detemir U-100  30 Units Subcutaneous QHS    NIFEdipine  90 mg Oral Daily    pantoprazole  40 mg Oral Daily    permethrin   Topical (Top) Daily    sodium chloride 0.9%  10 mL Intravenous Q6H    tamsulosin  0.4 mg Oral Daily     Continuous Infusions:  PRN Meds:acetaminophen, aluminum-magnesium hydroxide-simethicone, dextrose 10%, dextrose 10%, glucagon (human recombinant), glucose, glucose, hydrALAZINE, insulin aspart U-100, melatonin, ondansetron, sodium chloride 0.9%, Flushing PICC/Midline Protocol **AND** sodium chloride 0.9% **AND** sodium chloride 0.9%, triamcinolone acetonide 0.025%    Review of Systems   Constitutional:  Negative for fatigue and fever.   HENT: Negative.     Eyes: Negative.    Respiratory:  Positive for cough.    Cardiovascular: Negative.    Gastrointestinal: Negative.  Negative for nausea and vomiting.   Endocrine: Negative.    Genitourinary: Negative.    Skin:  Positive for wound.        Reports wounds to LLE   Allergic/Immunologic: Negative.    Hematological: Negative.    Psychiatric/Behavioral: Negative.       Objective:     Vital Signs (Most Recent):  Temp: 98.4 °F (36.9 °C) (03/13/24 1511)  Pulse: (!) 54 (03/13/24  1511)  Resp: 18 (03/13/24 1511)  BP: (!) 147/61 (03/13/24 1511)  SpO2: (!) 92 % (03/13/24 1511) Vital Signs (24h Range):  Temp:  [98.2 °F (36.8 °C)-99.5 °F (37.5 °C)] 98.4 °F (36.9 °C)  Pulse:  [45-60] 54  Resp:  [17-19] 18  SpO2:  [92 %-99 %] 92 %  BP: (130-166)/(52-63) 147/61     Weight: 120.2 kg (265 lb)  Body mass index is 39.13 kg/m².    Foot Exam    General  Orientation: unable to assess       Right Foot/Ankle     Inspection and Palpation  Skin Exam: dry skin, fissure, skin changes, abnormal color and ulcer;     Neurovascular  Dorsalis pedis: absent  Posterior tibial: absent  Saphenous nerve sensation: diminished  Tibial nerve sensation: diminished  Superficial peroneal nerve sensation: diminished  Deep peroneal nerve sensation: diminished  Sural nerve sensation: diminished    Comments  Right anterior ankle wound:  Wound is superficial, with well adhered epithelialized tissue.  No surrounding erythema or edema noted.    Diffuse xerotic, abrasions, wounds, hemosiderin deposits and dry flaky skin noted.    Left Foot/Ankle      Inspection and Palpation  Skin Exam: dry skin, skin changes, abnormal color, ulcer and erythema;     Neurovascular  Dorsalis pedis: absent  Posterior tibial: absent  Saphenous nerve sensation: diminished  Tibial nerve sensation: diminished  Superficial peroneal nerve sensation: diminished  Deep peroneal nerve sensation: diminished  Sural nerve sensation: diminished    Comments  Status post Chopart amputation:  Now with full-thickness wound on the posterior aspect.  Wound is malodorous, probes deep to bone.  Granular base with macerated borders.  Undermining noted throughout plantar aspect. Bleeding noted on dressing change.    Left medial leg wound:  Irregular borders and widespread with weeping purulence noted.  Malodorous.  Surrounding erythema noted.  Edema noted.  Diffuse eschar and fibrous tissue noted.    Diffuse xerotic, abrasions, wounds, hemosiderin  "deposits            Laboratory:  A1C:   Recent Labs   Lab 03/05/24  0900   HGBA1C >14.0*     BMP:   Recent Labs   Lab 03/12/24  0552   *      K 4.3      CO2 28   BUN 19   CREATININE 1.4   CALCIUM 8.2*   MG 1.8     CBC:   Recent Labs   Lab 03/12/24  0552   WBC 8.81   RBC 3.03*   HGB 7.4*   HCT 24.9*   *   MCV 82   MCH 24.4*   MCHC 29.7*     CMP:   Recent Labs   Lab 03/12/24  0552   *   CALCIUM 8.2*   ALBUMIN 1.7*   PROT 6.2      K 4.3   CO2 28      BUN 19   CREATININE 1.4   ALKPHOS 69   ALT 18   AST 27   BILITOT 0.3     CRP: No results for input(s): "CRP" in the last 168 hours.  ESR: No results for input(s): "SEDRATE" in the last 168 hours.  Wound Cultures:   Recent Labs   Lab 10/03/23  1216 03/06/24  1439   LABAERO FERNANDO ALBICANS  Few  *  ACHROMOBACTER XYLOSOXIDANS SUBSP. DENTRIFICANS  Rare  Skin bossman also present  * PROTEUS MIRABILIS  Few  *  ENTEROBACTER CLOACAE  Few  Skin bossman also present  *     Microbiology Results (last 7 days)       Procedure Component Value Units Date/Time    Fungus culture [[id number]]  (Abnormal) Collected: 03/06/24 1339    Order Status: Completed Specimen: Wound from Leg, Left Updated: 03/13/24 1403     Fungus (Mycology) Culture FUSARIUM SPECIES      CANDIDA ALBICANS  Many      Narrative:      Left distal leg wound    Culture, Anaerobe [[id number]] Collected: 03/06/24 1339    Order Status: Completed Specimen: Wound from Leg, Left Updated: 03/11/24 0753     Anaerobic Culture No anaerobes isolated    Narrative:      Left distal leg wound    Blood culture x two cultures. Draw prior to antibiotics. [[id number]] Collected: 03/05/24 0800    Order Status: Completed Specimen: Blood from Peripheral, Forearm, Right Updated: 03/10/24 1044     Blood Culture, Routine No growth after 5 days.    Narrative:      Aerobic and anaerobic    Blood culture x two cultures. Draw prior to antibiotics. [[id number]] Collected: 03/05/24 0800    Order Status: " Completed Specimen: Blood from Peripheral, Hand, Left Updated: 03/10/24 1043     Blood Culture, Routine No growth after 5 days.    Narrative:      Aerobic and anaerobic    Aerobic culture [[id number]]  (Abnormal)  (Susceptibility) Collected: 03/06/24 1439    Order Status: Completed Specimen: Wound from Leg, Left Updated: 03/08/24 1102     Aerobic Bacterial Culture PROTEUS MIRABILIS  Few        ENTEROBACTER CLOACAE  Few  Skin bossman also present      Narrative:      Left distal leg wound    AFB Culture & Smear [[id number]] Collected: 03/06/24 1439    Order Status: Completed Specimen: Wound from Leg, Left Updated: 03/07/24 2127     AFB Culture & Smear Culture in progress     AFB CULTURE STAIN No acid fast bacilli seen.    Narrative:      Left distal leg wound    Gram stain [[id number]] Collected: 03/06/24 1439    Order Status: Completed Specimen: Wound from Leg, Left Updated: 03/06/24 2138     Gram Stain Result Many WBC's      Many Gram positive cocci      Rare yeast      Rare Gram positive rods    Narrative:      Left distal leg wound          Specimen (24h ago, onward)      None            Diagnostic Results:  I have reviewed all pertinent imaging results/findings within the past 24 hours.      Assessment/Plan:     ID  * Osteomyelitis of left lower extremity  Saji Castañeda is a 74 y.o. male who is status post left lower extremity toe part amputation.  Now presenting for acute infection, with acute on chronic osteomyelitis of the left calcaneus.  Wound is malodorous and exuding purulence.  Left proximal leg with weeping purulence noted as well.  Arterial ultrasound shows no hemodynamically significant stenosis. Left MRI shows osteomyelitis.  At this time there is no more proximal amputation that Podiatry can offer.      - Discussed with patient that we recommend more proximal amp as there is limited treatment from Podiatry perspective. Given lengthy discussion of risks patient now possibly amendable to BKA/AKA.  Patient needs time to contemplate decision.  - Vascular surgery was consulted who recommended BKA/AKA. Patient not amendable. Consider re-consult if patient is amenable.   - L heel wound +proteus mirabilis and enterobacter. Abx per ID  - LLE dressed with Hydrafera blue, Kirlex, and ACE wrap  - NWB, patient to use wheelchair  - All other care per primary  - Podiatry will continue to follow for one more day to ascertain patient wishes on amputation. If not amenable, podiatry will sign off tomorrow and give wound care recommendations.         Brooks Trujillo DPM  Podiatry  [facility] - Telemetry Stepdown

## 2024-03-13 NOTE — PROGRESS NOTES
[facility] - Telemetry [facility] Medicine  Progress Note    Patient Name: Saji Castañeda  MRN: [id number]  Patient Class: IP- Inpatient   Admission Date: 3/5/2024  Length of Stay: 8 days  Attending Physician: Seymour Cullen MD  Primary Care Provider: [facility] -        Subjective:     Principal Problem:Osteomyelitis of left lower extremity        HPI:  Saji Castañeda is a 74 y.o. male with a medical history of DM2, HLD, HTN, chronic osteomyelitis of L heel, L TMA, PAD, hx of ESBL klebsiella, acinetobacter bacteremia, presenting with hyperglycemia. He presented to the ED via EMS and his POCT glucose on arrival was >500. Serum blood glucose 667 with anion gap of 17 and elevated ketones. Serum potassium 3.4, corrected sodium 149. Urinalysis significant for RBCs, glucosuria, and moderate bacteria and yeast. CBC revealed leukocytosis, elevated platelets, and mild anemia. CMP shows Na 135, K 3.4, BUN 29, Cr 2.1, Glucose 677, Lactate 4.61. BNP and troponin elevated. GFR 32.2. Insulin drip, IV fluids, zosyn, vancomycin, and potassium given.      He is unable to provide much history, but states that he has not been taking his home medications for at least a week. He reports shortness of breath, fatigue, and weakness for the last 6-7 days. He has chills and reports episodes of emesis. He denies abdominal pain, chest pain, palpitations, recent illness/infection, fevers, changes to bowel movements and urinary output. Patient lives with his brother and sister at home. He was last seen in the ED on 2/21 after a fall. He was hypoglycemic BGL 60 at that time which returned to normal after eating. He was also scheduled for a wound clinic appointment today 3/5 at [facility] with Dr. Wilson.        Overview/Hospital Course:  Wound care and cultures collected by Podiatry with recs for Vascular consult for amputation. Patient declined amputation. ID recommending IV Ertapenem for chronic osteomyelitis for 6 week duration  (EED: 4/15/24). With high wound care needs and IV antibiotics plan patient needing long-term placement. He is being treated with topical permethrin for bedbugs. Wound care has been dressing the wounds. 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 Accepted to [facility], submitted for authorization.    Interval History: Awaiting authorization for [facility]. Considering voriconazole for fusarium coverage which grew on fungal culture.     Review of Systems  Objective:     Vital Signs (Most Recent):  Temp: 98.8 °F (37.1 °C) (03/13/24 1134)  Pulse: (!) 51 (03/13/24 1511)  Resp: 18 (03/13/24 1352)  BP: (!) 148/61 (03/13/24 1134)  SpO2: 98 % (03/13/24 1352) Vital Signs (24h Range):  Temp:  [98.2 °F (36.8 °C)-99.5 °F (37.5 °C)] 98.8 °F (37.1 °C)  Pulse:  [45-60] 51  Resp:  [17-19] 18  SpO2:  [94 %-99 %] 98 %  BP: (130-166)/(52-63) 148/61     Weight: 120.2 kg (265 lb)  Body mass index is 39.13 kg/m².    Intake/Output Summary (Last 24 hours) at 3/13/2024 1542  Last data filed at 3/13/2024 0800  Gross per 24 hour   Intake 300 ml   Output 400 ml   Net -100 ml         Physical Exam  Vitals and nursing note reviewed.   Constitutional:       Appearance: He is obese.   HENT:      Head: Normocephalic and atraumatic.   Eyes:      Conjunctiva/sclera: Conjunctivae normal.      Pupils: Pupils are equal, round, and reactive to light.   Cardiovascular:      Rate and Rhythm: Normal rate and regular rhythm.      Pulses: Normal pulses.   Pulmonary:      Effort: Pulmonary effort is normal. No respiratory distress.      Breath sounds: No wheezing or rales.   Abdominal:      Palpations: Abdomen is soft.      Tenderness: There is no abdominal tenderness. There is no guarding.   Musculoskeletal:      Comments: LLE wounds with dressing C/D/I   Skin:     General: Skin is warm and dry.   Neurological:      General: No focal deficit present.      Mental Status: He is alert and  oriented to person, place, and time.   Psychiatric:         Mood and Affect: Mood normal.             Significant Labs: All pertinent labs within the past 24 hours have been reviewed.  CBC:   Recent Labs   Lab 03/12/24  0552   WBC 8.81   HGB 7.4*   HCT 24.9*   *     CMP:   Recent Labs   Lab 03/12/24  0552      K 4.3      CO2 28   *   BUN 19   CREATININE 1.4   CALCIUM 8.2*   PROT 6.2   ALBUMIN 1.7*   BILITOT 0.3   ALKPHOS 69   AST 27   ALT 18   ANIONGAP 7*     POCT Glucose:   Recent Labs   Lab 03/12/24  2103 03/13/24  0942 03/13/24  1235   POCTGLUCOSE 128* 187* 260*       Significant Imaging: I have reviewed all pertinent imaging results/findings within the past 24 hours.    Assessment/Plan:      * Osteomyelitis of left lower extremity  Patient with Proteus and enterobacter on leg cultures collected by Podiatry with concern for acute on chronic osteo. Fungal culture grew fusarium    - IV ertapenem with end date 4/15/24   - will likely need voriconazole for fusarium coverage which grew on fungal culture, awaiting EKG for qtc  - midline catheters placed on admission for difficult IV access  -Still thinking about the amputation recommended by vascular surgery    History of Bedbug bite with infection  Patient started on Permethrin daily for 5 days starting 3/12      Chronic anticoagulation  - See DVT      History of amputation of left high mid foot   See osteo      Stage 3b chronic kidney disease  Creatine stable for now. BMP reviewed- noted Estimated Creatinine Clearance: 54.5 mL/min (A) (based on SCr of 1.5 mg/dL (H)). according to latest data. Based on current GFR, CKD stage is stage 3 - GFR 30-59.  Monitor UOP and serial BMP and adjust therapy as needed. Renally dose meds. Avoid nephrotoxic medications and procedures.    Severe sepsis  This patient does have evidence of infective focus  My overall impression is sepsis.  Source: Skin and Soft Tissue (location left leg)  Antibiotics given-  "  Antibiotics (72h ago, onward)      Start     Stop Route Frequency Ordered    03/08/24 1230  ertapenem (INVANZ) 1 g in sodium chloride 0.9 % 100 mL IVPB (MB+)         -- IV Every 24 hours (non-standard times) 03/08/24 1115          Latest lactate reviewed-  No results for input(s): "LACTATE", "POCLAC" in the last 72 hours.    Organ dysfunction indicated by Acute kidney injury    Fluid challenge Actual Body weight- Patient will receive 30ml/kg actual body weight to calculate fluid bolus for treatment of septic shock.     Post- resuscitation assessment No - Post resuscitation assessment not needed       Will Not start Pressors-     - See osteo      Other hyperlipidemia  Continue home atorvastatin      Diabetic ketoacidosis without coma associated with type 2 diabetes mellitus  Patient's FSGs are uncontrolled due to hyperglycemia on current medication regimen.  Last A1c reviewed-   Lab Results   Component Value Date    HGBA1C >14.0 (H) 03/05/2024     Most recent fingerstick glucose reviewed-   Recent Labs   Lab 03/12/24  1641 03/12/24  2103 03/13/24  0942 03/13/24  1235   POCTGLUCOSE 185* 128* 187* 260*       Current correctional scale   Insulin drip  Maintain anti-hyperglycemic dose as follows-   Antihyperglycemics (From admission, onward)      Start     Stop Route Frequency Ordered    03/11/24 1521  insulin aspart U-100 pen 0-5 Units         -- SubQ Before meals & nightly PRN 03/11/24 1520    03/07/24 1130  insulin aspart U-100 pen 10 Units         -- SubQ 3 times daily with meals 03/07/24 1028    03/05/24 1800  insulin detemir U-100 (Levemir) pen 30 Units         -- SubQ Nightly 03/05/24 1713          Hold Oral hypoglycemics while patient is in the hospital.  Will keep patient on DKA pathway with insulin drip and fluid with protocol in place for switching to subcutaneous insulin as anion gap closes.      DKA  HHS  - Resolved  - Continue insulin regimen and BG checks TIDWM and QHS      Paroxysmal atrial " fibrillation  Patient with Persistent (7 days or more) atrial fibrillation which is uncontrolled currently with    . Patient is currently in sinus rhythm.[id number] Score: 4. . Anticoagulation indicated. Anticoagulation done with lovenox as we could not get IV access for heparin  .    -apixaban    Chronic deep vein thrombosis (DVT) of right upper extremity  - Continue home AC      Cellulitis of left lower leg  See osteo      Peripheral arterial disease  Resume plavix      Essential hypertension  Chronic, uncontrolled. Latest blood pressure and vitals reviewed-     Temp:  [98.2 °F (36.8 °C)-99.5 °F (37.5 °C)]   Pulse:  [45-60]   Resp:  [17-19]   BP: (130-166)/(52-63)   SpO2:  [94 %-99 %] .   Home meds for hypertension were reviewed and noted below.   Hypertension Medications               furosemide (LASIX) 40 MG tablet Take 20 mg by mouth.    hydrALAZINE (APRESOLINE) 100 MG tablet Take 1 tablet (100 mg total) by mouth every 8 (eight) hours.    isosorbide dinitrate (ISORDIL) 10 MG tablet Take 1 tablet (10 mg total) by mouth 3 (three) times daily.    NIFEdipine (PROCARDIA-XL) 60 MG (OSM) 24 hr tablet Take 1 tablet (60 mg total) by mouth once daily.            While in the hospital, will manage blood pressure as follows; Adjust home antihypertensive regimen as follows- will keep hydralazine but hold valsartan and lasix    Will utilize p.r.n. blood pressure medication only if patient's blood pressure greater than 180/110 and he develops symptoms such as worsening chest pain or shortness of breath.      VTE Risk Mitigation (From admission, onward)           Ordered     apixaban tablet 5 mg  2 times daily         03/07/24 1810                    Discharge Planning   OXANA: 3/13/2024     Code Status: Full Code   Is the patient medically ready for discharge?: Yes    Reason for patient still in hospital (select all that apply): Pending disposition  Discharge Plan A: Skilled Nursing Facility   Discharge Delays: None known at  this time              Milagros Nicolas MD  Department of Hospital Medicine   [facility] - Telemetry Stepdown

## 2024-03-13 NOTE — SUBJECTIVE & OBJECTIVE
Interval History: Awaiting authorization for [facility]. Considering voriconazole for fusarium coverage which grew on fungal culture.     Review of Systems  Objective:     Vital Signs (Most Recent):  Temp: 98.8 °F (37.1 °C) (03/13/24 1134)  Pulse: (!) 51 (03/13/24 1511)  Resp: 18 (03/13/24 1352)  BP: (!) 148/61 (03/13/24 1134)  SpO2: 98 % (03/13/24 1352) Vital Signs (24h Range):  Temp:  [98.2 °F (36.8 °C)-99.5 °F (37.5 °C)] 98.8 °F (37.1 °C)  Pulse:  [45-60] 51  Resp:  [17-19] 18  SpO2:  [94 %-99 %] 98 %  BP: (130-166)/(52-63) 148/61     Weight: 120.2 kg (265 lb)  Body mass index is 39.13 kg/m².    Intake/Output Summary (Last 24 hours) at 3/13/2024 1542  Last data filed at 3/13/2024 0800  Gross per 24 hour   Intake 300 ml   Output 400 ml   Net -100 ml         Physical Exam  Vitals and nursing note reviewed.   Constitutional:       Appearance: He is obese.   HENT:      Head: Normocephalic and atraumatic.   Eyes:      Conjunctiva/sclera: Conjunctivae normal.      Pupils: Pupils are equal, round, and reactive to light.   Cardiovascular:      Rate and Rhythm: Normal rate and regular rhythm.      Pulses: Normal pulses.   Pulmonary:      Effort: Pulmonary effort is normal. No respiratory distress.      Breath sounds: No wheezing or rales.   Abdominal:      Palpations: Abdomen is soft.      Tenderness: There is no abdominal tenderness. There is no guarding.   Musculoskeletal:      Comments: LLE wounds with dressing C/D/I   Skin:     General: Skin is warm and dry.   Neurological:      General: No focal deficit present.      Mental Status: He is alert and oriented to person, place, and time.   Psychiatric:         Mood and Affect: Mood normal.             Significant Labs: All pertinent labs within the past 24 hours have been reviewed.  CBC:   Recent Labs   Lab 03/12/24  0552   WBC 8.81   HGB 7.4*   HCT 24.9*   *     CMP:   Recent Labs   Lab 03/12/24  0552      K 4.3      CO2 28   *   BUN 19    CREATININE 1.4   CALCIUM 8.2*   PROT 6.2   ALBUMIN 1.7*   BILITOT 0.3   ALKPHOS 69   AST 27   ALT 18   ANIONGAP 7*     POCT Glucose:   Recent Labs   Lab 03/12/24  2103 03/13/24  0942 03/13/24  1235   POCTGLUCOSE 128* 187* 260*       Significant Imaging: I have reviewed all pertinent imaging results/findings within the past 24 hours.

## 2024-03-13 NOTE — SUBJECTIVE & OBJECTIVE
Subjective:     Interval History: NAEON, NAD, Slight bradycardia, slight hypertension. Patient is afebrile, dressings are somewhat soaked on distal wound.  Patient still deciding on amputation or not.    New pedal complaints: None  Denies post op fever.          Scheduled Meds:   apixaban  5 mg Oral BID    atorvastatin  40 mg Oral Daily    clopidogreL  75 mg Oral Daily    ertapenem (INVanz) IV (PEDS and ADULTS)  1 g Intravenous Q24H    gabapentin  300 mg Oral BID    insulin aspart U-100  10 Units Subcutaneous TIDWM    insulin detemir U-100  30 Units Subcutaneous QHS    NIFEdipine  90 mg Oral Daily    pantoprazole  40 mg Oral Daily    permethrin   Topical (Top) Daily    sodium chloride 0.9%  10 mL Intravenous Q6H    tamsulosin  0.4 mg Oral Daily     Continuous Infusions:  PRN Meds:acetaminophen, aluminum-magnesium hydroxide-simethicone, dextrose 10%, dextrose 10%, glucagon (human recombinant), glucose, glucose, hydrALAZINE, insulin aspart U-100, melatonin, ondansetron, sodium chloride 0.9%, Flushing PICC/Midline Protocol **AND** sodium chloride 0.9% **AND** sodium chloride 0.9%, triamcinolone acetonide 0.025%    Review of Systems   Constitutional:  Negative for fatigue and fever.   HENT: Negative.     Eyes: Negative.    Respiratory:  Positive for cough.    Cardiovascular: Negative.    Gastrointestinal: Negative.  Negative for nausea and vomiting.   Endocrine: Negative.    Genitourinary: Negative.    Skin:  Positive for wound.        Reports wounds to LLE   Allergic/Immunologic: Negative.    Hematological: Negative.    Psychiatric/Behavioral: Negative.       Objective:     Vital Signs (Most Recent):  Temp: 98.4 °F (36.9 °C) (03/13/24 1511)  Pulse: (!) 54 (03/13/24 1511)  Resp: 18 (03/13/24 1511)  BP: (!) 147/61 (03/13/24 1511)  SpO2: (!) 92 % (03/13/24 1511) Vital Signs (24h Range):  Temp:  [98.2 °F (36.8 °C)-99.5 °F (37.5 °C)] 98.4 °F (36.9 °C)  Pulse:  [45-60] 54  Resp:  [17-19] 18  SpO2:  [92 %-99 %] 92 %  BP:  (130-166)/(52-63) 147/61     Weight: 120.2 kg (265 lb)  Body mass index is 39.13 kg/m².    Foot Exam    General  Orientation: unable to assess       Right Foot/Ankle     Inspection and Palpation  Skin Exam: dry skin, fissure, skin changes, abnormal color and ulcer;     Neurovascular  Dorsalis pedis: absent  Posterior tibial: absent  Saphenous nerve sensation: diminished  Tibial nerve sensation: diminished  Superficial peroneal nerve sensation: diminished  Deep peroneal nerve sensation: diminished  Sural nerve sensation: diminished    Comments  Right anterior ankle wound:  Wound is superficial, with well adhered epithelialized tissue.  No surrounding erythema or edema noted.    Diffuse xerotic, abrasions, wounds, hemosiderin deposits and dry flaky skin noted.    Left Foot/Ankle      Inspection and Palpation  Skin Exam: dry skin, skin changes, abnormal color, ulcer and erythema;     Neurovascular  Dorsalis pedis: absent  Posterior tibial: absent  Saphenous nerve sensation: diminished  Tibial nerve sensation: diminished  Superficial peroneal nerve sensation: diminished  Deep peroneal nerve sensation: diminished  Sural nerve sensation: diminished    Comments  Status post Chopart amputation:  Now with full-thickness wound on the posterior aspect.  Wound is malodorous, probes deep to bone.  Granular base with macerated borders.  Undermining noted throughout plantar aspect. Bleeding noted on dressing change.    Left medial leg wound:  Irregular borders and widespread with weeping purulence noted.  Malodorous.  Surrounding erythema noted.  Edema noted.  Diffuse eschar and fibrous tissue noted.    Diffuse xerotic, abrasions, wounds, hemosiderin deposits            Laboratory:  A1C:   Recent Labs   Lab 03/05/24  0900   HGBA1C >14.0*     BMP:   Recent Labs   Lab 03/12/24  0552   *      K 4.3      CO2 28   BUN 19   CREATININE 1.4   CALCIUM 8.2*   MG 1.8     CBC:   Recent Labs   Lab 03/12/24  0552   WBC 8.81  "  RBC 3.03*   HGB 7.4*   HCT 24.9*   *   MCV 82   MCH 24.4*   MCHC 29.7*     CMP:   Recent Labs   Lab 03/12/24  0552   *   CALCIUM 8.2*   ALBUMIN 1.7*   PROT 6.2      K 4.3   CO2 28      BUN 19   CREATININE 1.4   ALKPHOS 69   ALT 18   AST 27   BILITOT 0.3     CRP: No results for input(s): "CRP" in the last 168 hours.  ESR: No results for input(s): "SEDRATE" in the last 168 hours.  Wound Cultures:   Recent Labs   Lab 10/03/23  1216 03/06/24  1439   LABAERO FERNANDO ALBICANS  Few  *  ACHROMOBACTER XYLOSOXIDANS SUBSP. DENTRIFICANS  Rare  Skin bossman also present  * PROTEUS MIRABILIS  Few  *  ENTEROBACTER CLOACAE  Few  Skin bossman also present  *     Microbiology Results (last 7 days)       Procedure Component Value Units Date/Time    Fungus culture [[id number]]  (Abnormal) Collected: 03/06/24 1339    Order Status: Completed Specimen: Wound from Leg, Left Updated: 03/13/24 1403     Fungus (Mycology) Culture FUSARIUM SPECIES      CANDIDA ALBICANS  Many      Narrative:      Left distal leg wound    Culture, Anaerobe [[id number]] Collected: 03/06/24 1339    Order Status: Completed Specimen: Wound from Leg, Left Updated: 03/11/24 0753     Anaerobic Culture No anaerobes isolated    Narrative:      Left distal leg wound    Blood culture x two cultures. Draw prior to antibiotics. [[id number]] Collected: 03/05/24 0800    Order Status: Completed Specimen: Blood from Peripheral, Forearm, Right Updated: 03/10/24 1044     Blood Culture, Routine No growth after 5 days.    Narrative:      Aerobic and anaerobic    Blood culture x two cultures. Draw prior to antibiotics. [[id number]] Collected: 03/05/24 0800    Order Status: Completed Specimen: Blood from Peripheral, Hand, Left Updated: 03/10/24 1043     Blood Culture, Routine No growth after 5 days.    Narrative:      Aerobic and anaerobic    Aerobic culture [[id number]]  (Abnormal)  (Susceptibility) Collected: 03/06/24 1439    Order Status: Completed " Specimen: Wound from Leg, Left Updated: 03/08/24 1102     Aerobic Bacterial Culture PROTEUS MIRABILIS  Few        ENTEROBACTER CLOACAE  Few  Skin bossman also present      Narrative:      Left distal leg wound    AFB Culture & Smear [[id number]] Collected: 03/06/24 1439    Order Status: Completed Specimen: Wound from Leg, Left Updated: 03/07/24 2127     AFB Culture & Smear Culture in progress     AFB CULTURE STAIN No acid fast bacilli seen.    Narrative:      Left distal leg wound    Gram stain [[id number]] Collected: 03/06/24 1439    Order Status: Completed Specimen: Wound from Leg, Left Updated: 03/06/24 2138     Gram Stain Result Many WBC's      Many Gram positive cocci      Rare yeast      Rare Gram positive rods    Narrative:      Left distal leg wound          Specimen (24h ago, onward)      None            Diagnostic Results:  I have reviewed all pertinent imaging results/findings within the past 24 hours.

## 2024-03-13 NOTE — PLAN OF CARE
Pt engaged fairly in therapy this date, limited by pain on buttocks from wound.     Problem: Occupational Therapy  Goal: Occupational Therapy Goal  Description: Goals to be met by: 4/6/24     Patient will increase functional independence with ADLs by performing:    UE Dressing with Contact Guard Assistance.  LE Dressing with Maximum Assistance.  Grooming while EOB with Stand-by Assistance.  Toileting from bedside commode with Maximum Assistance for hygiene and clothing management.   Sitting at edge of bed x5 minutes with Stand-by Assistance.  Rolling to Bilateral with Minimal Assistance.   Supine to sit with Minimal Assistance.  Stand pivot transfers with Minimal Assistance.  Toilet transfer to bedside commode with Maximum Assistance.  BUE strengthening exercise program 2x day with theraband/HEP worksheet    Outcome: Ongoing, Progressing

## 2024-03-13 NOTE — ASSESSMENT & PLAN NOTE
Patient with Persistent (7 days or more) atrial fibrillation which is uncontrolled currently with    . Patient is currently in sinus rhythm.[id number] Score: 4. . Anticoagulation indicated. Anticoagulation done with lovenox as we could not get IV access for heparin  .    -apixaban

## 2024-03-13 NOTE — ASSESSMENT & PLAN NOTE
Saji Castañeda is a 74 y.o. male who is status post left lower extremity toe part amputation.  Now presenting for acute infection, with acute on chronic osteomyelitis of the left calcaneus.  Wound is malodorous and exuding purulence.  Left proximal leg with weeping purulence noted as well.  Arterial ultrasound shows no hemodynamically significant stenosis. Left MRI shows osteomyelitis.  At this time there is no more proximal amputation that Podiatry can offer.      - Discussed with patient that we recommend more proximal amp as there is limited treatment from Podiatry perspective. Given lengthy discussion of risks patient now possibly amendable to BKA/AKA. Patient needs time to contemplate decision.  - Vascular surgery was consulted who recommended BKA/AKA. Patient not amendable. Consider re-consult if patient is amenable.   - L heel wound +proteus mirabilis and enterobacter. Abx per ID  - LLE dressed with Hydrafera blue, Kirlex, and ACE wrap  - NWB, patient to use wheelchair  - All other care per primary  - Podiatry will continue to follow for one more day to ascertain patient wishes on amputation. If not amenable, podiatry will sign off tomorrow and give wound care recommendations.

## 2024-03-14 PROBLEM — R22.31 LOCALIZED SWELLING OF RIGHT UPPER EXTREMITY: Status: ACTIVE | Noted: 2024-03-14

## 2024-03-14 LAB
ALBUMIN SERPL BCP-MCNC: 1.5 G/DL (ref 3.5–5.2)
ALP SERPL-CCNC: 59 U/L (ref 55–135)
ALT SERPL W/O P-5'-P-CCNC: 11 U/L (ref 10–44)
ANION GAP SERPL CALC-SCNC: 5 MMOL/L (ref 8–16)
AST SERPL-CCNC: 19 U/L (ref 10–40)
BASOPHILS # BLD AUTO: 0.04 K/UL (ref 0–0.2)
BASOPHILS NFR BLD: 0.5 % (ref 0–1.9)
BILIRUB SERPL-MCNC: 0.2 MG/DL (ref 0.1–1)
BUN SERPL-MCNC: 14 MG/DL (ref 8–23)
CALCIUM SERPL-MCNC: 7.9 MG/DL (ref 8.7–10.5)
CHLORIDE SERPL-SCNC: 106 MMOL/L (ref 95–110)
CO2 SERPL-SCNC: 28 MMOL/L (ref 23–29)
CREAT SERPL-MCNC: 1.4 MG/DL (ref 0.5–1.4)
DIFFERENTIAL METHOD BLD: ABNORMAL
EOSINOPHIL # BLD AUTO: 0.3 K/UL (ref 0–0.5)
EOSINOPHIL NFR BLD: 3.8 % (ref 0–8)
ERYTHROCYTE [DISTWIDTH] IN BLOOD BY AUTOMATED COUNT: 17 % (ref 11.5–14.5)
EST. GFR  (NO RACE VARIABLE): 52.4 ML/MIN/1.73 M^2
GLUCOSE SERPL-MCNC: 93 MG/DL (ref 70–110)
HCT VFR BLD AUTO: 24.3 % (ref 40–54)
HGB BLD-MCNC: 7.5 G/DL (ref 14–18)
IMM GRANULOCYTES # BLD AUTO: 0.07 K/UL (ref 0–0.04)
IMM GRANULOCYTES NFR BLD AUTO: 0.9 % (ref 0–0.5)
LYMPHOCYTES # BLD AUTO: 1.5 K/UL (ref 1–4.8)
LYMPHOCYTES NFR BLD: 19.6 % (ref 18–48)
MAGNESIUM SERPL-MCNC: 1.8 MG/DL (ref 1.6–2.6)
MCH RBC QN AUTO: 25.3 PG (ref 27–31)
MCHC RBC AUTO-ENTMCNC: 30.9 G/DL (ref 32–36)
MCV RBC AUTO: 82 FL (ref 82–98)
MONOCYTES # BLD AUTO: 0.7 K/UL (ref 0.3–1)
MONOCYTES NFR BLD: 8.9 % (ref 4–15)
NEUTROPHILS # BLD AUTO: 5.1 K/UL (ref 1.8–7.7)
NEUTROPHILS NFR BLD: 66.3 % (ref 38–73)
NRBC BLD-RTO: 0 /100 WBC
OHS QRS DURATION: 90 MS
OHS QTC CALCULATION: 417 MS
PHOSPHATE SERPL-MCNC: 3 MG/DL (ref 2.7–4.5)
PLATELET # BLD AUTO: 516 K/UL (ref 150–450)
PMV BLD AUTO: 8.5 FL (ref 9.2–12.9)
POCT GLUCOSE: 114 MG/DL (ref 70–110)
POCT GLUCOSE: 166 MG/DL (ref 70–110)
POCT GLUCOSE: 240 MG/DL (ref 70–110)
POCT GLUCOSE: 284 MG/DL (ref 70–110)
POTASSIUM SERPL-SCNC: 4.1 MMOL/L (ref 3.5–5.1)
PROT SERPL-MCNC: 5.7 G/DL (ref 6–8.4)
RBC # BLD AUTO: 2.97 M/UL (ref 4.6–6.2)
SODIUM SERPL-SCNC: 139 MMOL/L (ref 136–145)
WBC # BLD AUTO: 7.65 K/UL (ref 3.9–12.7)

## 2024-03-14 PROCEDURE — 36415 COLL VENOUS BLD VENIPUNCTURE: CPT | Performed by: FAMILY MEDICINE

## 2024-03-14 PROCEDURE — 63600175 PHARM REV CODE 636 W HCPCS: Performed by: PHYSICIAN ASSISTANT

## 2024-03-14 PROCEDURE — 25000003 PHARM REV CODE 250: Performed by: PHYSICIAN ASSISTANT

## 2024-03-14 PROCEDURE — 20600001 HC STEP DOWN PRIVATE ROOM

## 2024-03-14 PROCEDURE — 84100 ASSAY OF PHOSPHORUS: CPT | Performed by: FAMILY MEDICINE

## 2024-03-14 PROCEDURE — 25000003 PHARM REV CODE 250

## 2024-03-14 PROCEDURE — 85025 COMPLETE CBC W/AUTO DIFF WBC: CPT | Performed by: FAMILY MEDICINE

## 2024-03-14 PROCEDURE — 99233 SBSQ HOSP IP/OBS HIGH 50: CPT | Mod: ,,, | Performed by: PHYSICIAN ASSISTANT

## 2024-03-14 PROCEDURE — A4216 STERILE WATER/SALINE, 10 ML: HCPCS

## 2024-03-14 PROCEDURE — 27000207 HC ISOLATION

## 2024-03-14 PROCEDURE — 25000003 PHARM REV CODE 250: Performed by: INTERNAL MEDICINE

## 2024-03-14 PROCEDURE — 80053 COMPREHEN METABOLIC PANEL: CPT | Performed by: FAMILY MEDICINE

## 2024-03-14 PROCEDURE — 83735 ASSAY OF MAGNESIUM: CPT | Performed by: FAMILY MEDICINE

## 2024-03-14 RX ADMIN — PANTOPRAZOLE SODIUM 40 MG: 40 TABLET, DELAYED RELEASE ORAL at 08:03

## 2024-03-14 RX ADMIN — Medication 10 ML: at 01:03

## 2024-03-14 RX ADMIN — INSULIN ASPART 2 UNITS: 100 INJECTION, SOLUTION INTRAVENOUS; SUBCUTANEOUS at 07:03

## 2024-03-14 RX ADMIN — INSULIN ASPART 1 UNITS: 100 INJECTION, SOLUTION INTRAVENOUS; SUBCUTANEOUS at 10:03

## 2024-03-14 RX ADMIN — Medication 10 ML: at 05:03

## 2024-03-14 RX ADMIN — ERTAPENEM 1 G: 1 INJECTION INTRAMUSCULAR; INTRAVENOUS at 03:03

## 2024-03-14 RX ADMIN — INSULIN ASPART 10 UNITS: 100 INJECTION, SOLUTION INTRAVENOUS; SUBCUTANEOUS at 08:03

## 2024-03-14 RX ADMIN — CLOPIDOGREL BISULFATE 75 MG: 75 TABLET ORAL at 08:03

## 2024-03-14 RX ADMIN — INSULIN DETEMIR 30 UNITS: 100 INJECTION, SOLUTION SUBCUTANEOUS at 10:03

## 2024-03-14 RX ADMIN — INSULIN ASPART 10 UNITS: 100 INJECTION, SOLUTION INTRAVENOUS; SUBCUTANEOUS at 12:03

## 2024-03-14 RX ADMIN — APIXABAN 10 MG: 5 TABLET, FILM COATED ORAL at 09:03

## 2024-03-14 RX ADMIN — INSULIN ASPART 10 UNITS: 100 INJECTION, SOLUTION INTRAVENOUS; SUBCUTANEOUS at 07:03

## 2024-03-14 RX ADMIN — PERMETHRIN: 50 CREAM TOPICAL at 08:03

## 2024-03-14 RX ADMIN — DEXTROSE MONOHYDRATE 720 MG: 50 INJECTION, SOLUTION INTRAVENOUS at 10:03

## 2024-03-14 RX ADMIN — DEXTROSE MONOHYDRATE 720 MG: 50 INJECTION, SOLUTION INTRAVENOUS at 12:03

## 2024-03-14 RX ADMIN — ATORVASTATIN CALCIUM 40 MG: 40 TABLET, FILM COATED ORAL at 08:03

## 2024-03-14 RX ADMIN — GABAPENTIN 300 MG: 300 CAPSULE ORAL at 08:03

## 2024-03-14 RX ADMIN — TAMSULOSIN HYDROCHLORIDE 0.4 MG: 0.4 CAPSULE ORAL at 08:03

## 2024-03-14 RX ADMIN — ACETAMINOPHEN 650 MG: 325 TABLET ORAL at 05:03

## 2024-03-14 RX ADMIN — Medication 10 ML: at 07:03

## 2024-03-14 RX ADMIN — Medication 10 ML: at 12:03

## 2024-03-14 RX ADMIN — APIXABAN 5 MG: 5 TABLET, FILM COATED ORAL at 08:03

## 2024-03-14 RX ADMIN — NIFEDIPINE 90 MG: 30 TABLET, FILM COATED, EXTENDED RELEASE ORAL at 08:03

## 2024-03-14 RX ADMIN — ACETAMINOPHEN 650 MG: 325 TABLET ORAL at 09:03

## 2024-03-14 RX ADMIN — GABAPENTIN 300 MG: 300 CAPSULE ORAL at 09:03

## 2024-03-14 NOTE — ASSESSMENT & PLAN NOTE
Patient has swelling and pain in the right arm and some swelling in the left arm as well    -US b/l upper extremity for DVT

## 2024-03-14 NOTE — ASSESSMENT & PLAN NOTE
Patient's FSGs are uncontrolled due to hyperglycemia on current medication regimen.  Last A1c reviewed-   Lab Results   Component Value Date    HGBA1C >14.0 (H) 03/05/2024     Most recent fingerstick glucose reviewed-   Recent Labs   Lab 03/13/24  0942 03/13/24  1235 03/13/24  1652 03/13/24  2154   POCTGLUCOSE 187* 260* 341* 166*       Current correctional scale   Insulin drip  Maintain anti-hyperglycemic dose as follows-   Antihyperglycemics (From admission, onward)    Start     Stop Route Frequency Ordered    03/11/24 1521  insulin aspart U-100 pen 0-5 Units         -- SubQ Before meals & nightly PRN 03/11/24 1520    03/07/24 1130  insulin aspart U-100 pen 10 Units         -- SubQ 3 times daily with meals 03/07/24 1028    03/05/24 1800  insulin detemir U-100 (Levemir) pen 30 Units         -- SubQ Nightly 03/05/24 1713        Hold Oral hypoglycemics while patient is in the hospital.  Will keep patient on DKA pathway with insulin drip and fluid with protocol in place for switching to subcutaneous insulin as anion gap closes.      DKA  HHS  - Resolved  - Continue insulin regimen and BG checks TIDWM and QHS     Orthopedic

## 2024-03-14 NOTE — PROGRESS NOTES
[facility] - Telemetry Stepdown  Infectious Disease  Progress Note    Patient Name: Saji Castañeda  MRN: [id number]  Admission Date: 3/5/2024  Length of Stay: 9 days  Attending Physician: Mahendra Collins III*  Primary Care Provider: [facility] -    Isolation Status: Contact  Assessment/Plan:      ID  Cellulitis of left lower leg  73 y/o male with A-fib, uncontrolled DMII, HLD, HTN, chronic heel ulcerations c/b chronic osteomyelitis, chronic leg wounds admitted for DKA. ID consulted for chronic wounds/ulcerations and abx guidance as with hx of MDROs.     Podiatry obtained left heel wound cultures (probes to bone). Cx + Proteus mirabilis, E. Cloaecae and now Fusarium and C. Albicans. Patient has been on IV Ertapenem with plans for six weeks of treatment at LTAC. ID re-consulted due to fungal cultures returning positive.      Recommendations  Continue IV Ertapenem x 6 weeks for acute on chronic osteomyelitis   Start IV Voriconazole 6 mg/kg IV q 12 hours x 2 doses followed by Voriconazole 200 mg PO BID   Would continue having discussions regarding proximal amputation as this will achieve source control and long term antimicrobials will not be indicated.  ID will follow.          Thank you for the consult. Please secure chat for any questions.  Kimmy Payne PA-C    Subjective:     Principal Problem:Osteomyelitis of left lower extremity    HPI: 73 y/o male with a.fib, DMII uncontrolled, HLD, HTN, chronic heel ulcerations c/b chronic osteomyelitis, hx of  Acinetobacter and ESBL klebsiella admitted for DKA. ID consulted for chronic wounds/ulcerations and abx guidance as with hx of MDROs. Remained afebrile, no leukocytosis. Pt was given dose of meropenem. Blood cultures remain NGTD.   Interval History:   ID consulted as wound cultures are positive for fusarium and candida. Repeat . Started Voriconazole. Patient being discharged to LTAC.    Review of Systems   Constitutional:  Negative for chills,  diaphoresis, fatigue and fever.   HENT:  Negative for congestion, rhinorrhea and sore throat.    Respiratory:  Negative for cough and shortness of breath.    Cardiovascular:  Negative for chest pain and leg swelling.   Gastrointestinal:  Negative for abdominal pain, constipation, nausea and vomiting.   Genitourinary:  Negative for dysuria, frequency and urgency.   Musculoskeletal:  Positive for gait problem and myalgias. Negative for arthralgias, back pain and neck pain.   Skin:  Positive for color change and wound.   Neurological:  Negative for weakness, numbness and headaches.     Objective:     Vital Signs (Most Recent):  Temp: 98.3 °F (36.8 °C) (03/14/24 0650)  Pulse: 80 (03/14/24 0650)  Resp: 16 (03/14/24 0650)  BP: 115/60 (03/14/24 0650)  SpO2: (!) 94 % (03/14/24 0650) Vital Signs (24h Range):  Temp:  [98.3 °F (36.8 °C)-99.8 °F (37.7 °C)] 98.3 °F (36.8 °C)  Pulse:  [52-89] 80  Resp:  [16-18] 16  SpO2:  [92 %-99 %] 94 %  BP: (115-158)/(60-72) 115/60     Weight: 120.2 kg (265 lb)  Body mass index is 39.13 kg/m².    Estimated Creatinine Clearance: 58.4 mL/min (based on SCr of 1.4 mg/dL).     Physical Exam  Constitutional:       General: He is not in acute distress.     Appearance: Normal appearance. He is well-developed. He is not ill-appearing or diaphoretic.   HENT:      Head: Normocephalic and atraumatic.      Right Ear: External ear normal.      Left Ear: External ear normal.      Nose: Nose normal.   Eyes:      General: No scleral icterus.        Right eye: No discharge.         Left eye: No discharge.      Conjunctiva/sclera: Conjunctivae normal.   Pulmonary:      Effort: Pulmonary effort is normal. No respiratory distress.      Breath sounds: No stridor.   Skin:     General: Skin is dry.      Coloration: Skin is not jaundiced or pale.      Findings: No erythema.      Comments: See wound photos in media tab   Neurological:      General: No focal deficit present.      Mental Status: He is alert and oriented  to person, place, and time. Mental status is at baseline.   Psychiatric:         Mood and Affect: Mood normal.         Behavior: Behavior normal.         Thought Content: Thought content normal.         Judgment: Judgment normal.            Significant Labs: All pertinent labs within the past 24 hours have been reviewed.    Significant Imaging: I have reviewed all pertinent imaging results/findings within the past 24 hours.

## 2024-03-14 NOTE — ASSESSMENT & PLAN NOTE
Patient with Persistent (7 days or more) atrial fibrillation which is uncontrolled currently with    . Patient is currently in sinus rhythm.[id number] Score: 4. . Anticoagulation indicated. Anticoagulation done with lovenox as we could not get IV access for heparin  .    -apixaban

## 2024-03-14 NOTE — PT/OT/SLP PROGRESS
Physical Therapy      Patient Name:  Saji Castañeda   MRN:  [id number]    PT/OT co-treat attempt at 1346, however patient not seen today secondary to patient declining therapy services this date. Patient also refusing PT/OT assistance to reposition patient to midline positioning, due to therapists receiving patient with head & trunk significantly leaned to R.      Patient Education Provided on:  The role of physical therapy and how the patient can benefit from skilled services  The negative effects of prolonged bed rest/sedentary behavior   The importance of body in bed body posturing and repositioning for prevention of skin breakdown & additional aforementioned negative effects  The importance of patient participation with PT  Pt white board updated with current therapists name and level of mobility assistance needed.      At this time, PT to reduce PT frequency to 2x/wk. Will follow-up at next appropriate date.

## 2024-03-14 NOTE — PT/OT/SLP PROGRESS
Occupational Therapy      Patient Name:  Saji Castañeda   MRN:  [id number]    Patient not seen today secondary to pt. Refusal this pm stating he wanted to rest and was hurting. Therapist requested pain meds for pt. Therapist educated pt. On importance of repositioning in bed,   3/14/2024

## 2024-03-14 NOTE — NURSING
Notified  regarding pts RUE being warm, swollen and painful. MD will pass along to day team. Arm is elevated on pillow. WCTM.

## 2024-03-14 NOTE — PLAN OF CARE
Problem: Physical Therapy  Goal: Physical Therapy Goal  Description: Goals to be met by: 24     Patient will increase functional independence with mobility by performin. Supine to sit with Moderate Assistance  2. Sit to supine with Moderate Assistance  3. Rolling to Left and Right with Moderate Assistance.  4. Sit to stand transfer with Maximum Assistance with LRAD  5. Bed to chair transfer with Moderate Assistance using LRAD  6. Wheelchair propulsion x100 feet with Stand-by Assistance using UE/LE  7. Sitting at edge of bed 10 minutes with Modified [place]  8. Lower extremity exercise program x30 reps per handout, with assistance as needed    Outcome: Ongoing, Progressing

## 2024-03-14 NOTE — SUBJECTIVE & OBJECTIVE
Interval History: REM[place] KERN, This morning patient complained of right arm pain and swelling, ordered US b/l for dvt.    Review Of System:  Constitutional:  Negative for activity change, appetite change and fever.   Respiratory:  Negative for cough, shortness of breath and wheezing.    Cardiovascular:  Negative for chest pain and leg swelling.   Gastrointestinal:  Negative for abdominal pain, constipation, diarrhea, nausea and vomiting.   Musculoskeletal:  Positive for arthralgias and myalgias.   Skin:  Negative for rash.   Neurological:  Negative for headaches.   Objective:     Vital Signs (Most Recent):  Temp: 98.3 °F (36.8 °C) (03/14/24 0650)  Pulse: 80 (03/14/24 0650)  Resp: 16 (03/14/24 0650)  BP: 115/60 (03/14/24 0650)  SpO2: (!) 94 % (03/14/24 0650) Vital Signs (24h Range):  Temp:  [98.3 °F (36.8 °C)-99.8 °F (37.7 °C)] 98.3 °F (36.8 °C)  Pulse:  [52-89] 80  Resp:  [16-19] 16  SpO2:  [92 %-99 %] 94 %  BP: (115-158)/(60-72) 115/60     Weight: 120.2 kg (265 lb)  Body mass index is 39.13 kg/m².    Intake/Output Summary (Last 24 hours) at 3/14/2024 0917  Last data filed at 3/14/2024 0606  Gross per 24 hour   Intake 250 ml   Output 850 ml   Net -600 ml         Physical Exam      Vitals and nursing note reviewed.   Constitutional:       Appearance: He is obese.   HENT:      Head: Normocephalic and atraumatic.   Eyes:      Conjunctiva/sclera: Conjunctivae normal.      Pupils: Pupils are equal, round, and reactive to light.   Cardiovascular:      Rate and Rhythm: Normal rate and regular rhythm.      Pulses: Normal pulses.   Pulmonary:      Effort: Pulmonary effort is normal. No respiratory distress.      Breath sounds: No wheezing or rales.   Abdominal:      Palpations: Abdomen is soft.      Tenderness: There is no abdominal tenderness. There is no guarding.   Musculoskeletal:      Comments: LLE wounds with dressing C/D/I   Skin:     General: Skin is warm and dry.   Neurological:      General: No focal deficit present.       Mental Status: He is alert and oriented to person, place, and time.   Psychiatric:         Mood and Affect: Mood normal.   Significant Labs: All pertinent labs within the past 24 hours have been reviewed.    Significant Imaging: I have reviewed all pertinent imaging results/findings within the past 24 hours.

## 2024-03-14 NOTE — PLAN OF CARE
Problem: Adult Inpatient Plan of Care  Goal: Patient-Specific Goal (Individualized)  Outcome: Ongoing, Not Progressing  Goal: Absence of Hospital-Acquired Illness or Injury  Outcome: Ongoing, Not Progressing  Goal: Optimal Comfort and Wellbeing  Outcome: Ongoing, Not Progressing  Goal: Readiness for Transition of Care  Outcome: Ongoing, Not Progressing     Problem: Adjustment to Illness (Sepsis/Septic Shock)  Goal: Optimal Coping  Outcome: Ongoing, Not Progressing     Pt AAOx4. RA. Tele SB. Wounds changed and redressed. Tylenol given for pain. Safety maintained. Call bell within reach. Bed alarm in use.

## 2024-03-14 NOTE — PT/OT/SLP PROGRESS
Physical Therapy Co-Treatment    Patient Name:  Saji Castañeda   MRN:  [id number]    Recommendations:     Discharge Recommendations: Moderate Intensity Therapy  Discharge Equipment Recommendations: lift device, hospital bed (Pressure relief mattress)  Barriers to discharge: Decreased caregiver support and Increased skilled assistance required    Assessment:   Co-evaluation/treatment performed due to patient's multiple deficits requiring two skilled therapists to appropriately and safely assess patient's strength, endurance, functional mobility, and ADL performance while facilitating functional tasks in addition to accommodating for patient's activity tolerance and medical acuity.    Saji Castañeda is a 74 y.o. male admitted with a medical diagnosis of Osteomyelitis of left lower extremity.  He presents with the following impairments/functional limitations: weakness, impaired endurance, impaired sensation, impaired functional mobility, impaired self care skills, impaired balance, decreased lower extremity function, decreased upper extremity function, pain, decreased ROM, impaired coordination, impaired skin. Patient demonstrating good motivation to participate in session, with fair response to completed activities and provided education. Patient's pain continues to pose significant limitation to bed mobility at this time. Patient requiring prolonged rest breaks secondary to his overall activity endurance, influenced by his aforementioned pain; muscular strength; & endurance capacity. This is evidenced by patient's demonstrated poor tolerance to bed mobility required to achieve EOB sitting + inability to maintain upright positioning once seated EOB. Furthermore, PT/OT required to pivot session emphasis to supporting exercises targeting for general mobility. Patient continues to demonstrate the need for moderate intensity therapy on a daily basis exhibited by decreased independence with self-care and functional mobility  "    Rehab Prognosis: Fair; patient would benefit from acute skilled PT services to address these deficits and reach maximum level of function.    Recent Surgery: * No surgery found *      Plan:     During this hospitalization, patient to be seen 4 x/week to address the identified rehab impairments via gait training, therapeutic activities, therapeutic exercises, neuromuscular re-education and progress toward the following goals:    Plan of Care Expires:  04/06/24    Subjective     Chief Complaint: Pain & SOB  Patient/Family Comments/goals: Pt agreeable to session  Pain/Comfort:  Pain Rating 1: 10/10  Pain Addressed 1: Reposition, Distraction, Nurse notified  Pain Rating Post-Intervention 1: Not rated      Objective:     Communicated with RN prior to session.  Patient found HOB elevated, with significant trunk lean to R. Patient with telemetry, pressure relief boots, PRAFO upon PT entry to room.     General Precautions: Standard, fall, contact, diabetic   Orthopedic Precautions:LLE non weight bearing, RLE weight bearing as tolerated   Braces:  ([place] (RLE))   Body mass index is 39.13 kg/m².  Oxygen Device: Room Air  Vitals: BP (!) 158/72   Pulse 89   Temp 98.3 °F (36.8 °C) (Oral)   Resp 18   Ht 5' 9" (1.753 m)   Wt 120.2 kg (265 lb)   SpO2 99%   BMI 39.13 kg/m²      Functional Mobility:  Bed Mobility: *Pain with all tasks   Rolling Left: TotalA with HOB Elevated  Rolling Right: TotalA with HOB Elevated  EOB Scooting:   Anterior: TotalAx2  Boosting: TotalAx2 with HOB Flat  Supine>Sit: x1, TotalAx2 with HOB Elevated. Pt unable to achieve full upright sit position  Sit>Supine: x1, TotalAx2 with HOB Elevated    Balance:   Static Sitting: TotalAx2. Pt w/ significant R lean onto elevated HOB; Unable to sustain midline positioning that PT/OT facilitated    AM-PAC 6 CLICK MOBILITY  Turning over in bed (including adjusting bedclothes, sheets and blankets)?: 2  Sitting down on and standing up from a chair with arms " (e.g., wheelchair, bedside commode, etc.): 1  Moving from lying on back to sitting on the side of the bed?: 2  Moving to and from a bed to a chair (including a wheelchair)?: 1  Need to walk in hospital room?: 1  Climbing 3-5 steps with a railing?: 1  Basic Mobility Total Score: 8     Treatment & Education:  Patient participated in and completed the following supine exercises:  Hip Abd/Add: x5 L/R. Cues for technique, including full ROM  Heel slides: x8 L/R. Cues & facilitation for technique, including full ROM  SLR: x8 L/R. Cues & facilitation for technique, including full ROM; maintaining QS with activity  Cervical lat flex: x2 L/R. Significantly reduced AROM B directions, L>R. Patient only able to achieve neutral position to L-side  Significantly reduced ROM observed throughout all exercises    PT/OT facilitating midline positioning of head; trunk; and BUE/BLEs, utilizing wedges & pillows    Patient Education Provided on:  The role of physical therapy and how the patient can benefit from skilled services  The negative effects of prolonged bed rest/sedentary behavior, along with the importance of positioning + change of positioning, & patient participation with PT  The importance of contacting RN, via call light, for mobility throughout the day  Pt white board updated with current therapists name and BUE/BLE prescribed supine exercise    Patient Verbalized understanding of all topics touched on this date. All patient questions answered within the PT scope of practice    Patient left HOB elevated with all lines intact, call button in reach, and RN notified..    GOALS:   Multidisciplinary Problems       Physical Therapy Goals          Problem: Physical Therapy    Goal Priority Disciplines Outcome Goal Variances Interventions   Physical Therapy Goal     PT, PT/OT Ongoing, Progressing     Description: Goals to be met by: 24     Patient will increase functional independence with mobility by performin.  Supine to sit with Moderate Assistance  2. Sit to supine with Moderate Assistance  3. Rolling to Left and Right with Moderate Assistance.  4. Sit to stand transfer with Maximum Assistance with LRAD  5. Bed to chair transfer with Moderate Assistance using LRAD  6. Wheelchair propulsion x100 feet with Stand-by Assistance using UE/LE  7. Sitting at edge of bed 10 minutes with Modified [place]  8. Lower extremity exercise program x30 reps per handout, with assistance as needed                         Time Tracking:     PT Received On: 03/13/24  PT Start Time: 1518     PT Stop Time: 1601  PT Total Time (min): 43 min     Billable Minutes: Therapeutic Activity 33 and Therapeutic Exercise 10    Treatment Type: Treatment  PT/PTA: PT     Number of PTA visits since last PT visit: 0     03/13/2024

## 2024-03-14 NOTE — ASSESSMENT & PLAN NOTE
Chronic, uncontrolled. Latest blood pressure and vitals reviewed-     Temp:  [98.3 °F (36.8 °C)-99.8 °F (37.7 °C)]   Pulse:  [52-89]   Resp:  [16-19]   BP: (115-158)/(60-72)   SpO2:  [92 %-99 %] .   Home meds for hypertension were reviewed and noted below.   Hypertension Medications               furosemide (LASIX) 40 MG tablet Take 20 mg by mouth.    hydrALAZINE (APRESOLINE) 100 MG tablet Take 1 tablet (100 mg total) by mouth every 8 (eight) hours.    isosorbide dinitrate (ISORDIL) 10 MG tablet Take 1 tablet (10 mg total) by mouth 3 (three) times daily.    NIFEdipine (PROCARDIA-XL) 60 MG (OSM) 24 hr tablet Take 1 tablet (60 mg total) by mouth once daily.            While in the hospital, will manage blood pressure as follows; Adjust home antihypertensive regimen as follows- will keep hydralazine but hold valsartan and lasix    Will utilize p.r.n. blood pressure medication only if patient's blood pressure greater than 180/110 and he develops symptoms such as worsening chest pain or shortness of breath.

## 2024-03-14 NOTE — PROGRESS NOTES
"[facility] - Telemetry Stepdown  Adult Nutrition  Progress Note    SUMMARY       Recommendations    Continue 2000 kcal ADA diet.  RD to monitor & follow-up.    Goals: Meet % EEN, EPN by RD f/u date  Nutrition Goal Status: new  Communication of RD Recs: reviewed with RN    Assessment and Plan    Nutrition Problem:  Excessive CHO intake    Related to (etiology):   Food and nutrition related knowledge deficit     Signs and Symptoms (as evidenced by):   A1C >14    Interventions(treatment strategy):  Collaboration of nutrition care w/ other providers     Nutrition Diagnosis Status:   New    Reason for Assessment    Reason For Assessment: RD follow-up  Diagnosis: other (see comments) (Osteomyelitis)  Relevant Medical History: DM  Interdisciplinary Rounds: did not attend    General Information Comments: Diabetic diet education complete 3/6. Pt tolerating diet w/ good appetite. Good appetite PTA & UBW of 270# - appears nourished w/ no indicators of malnutrition.   Nutrition Discharge Planning: Adequate PO intake    Nutrition/Diet History    Patient Reported Diet/Restrictions/Preferences: general  Spiritual, Cultural Beliefs, Mosque Practices, Values that Affect Care: no  Factors Affecting Nutritional Intake: None identified at this time    Anthropometrics    Temp: 98.3 °F (36.8 °C)  Height Method: Stated  Height: 5' 9" (175.3 cm)  Height (inches): 69 in  Weight Method: Bed Scale  Weight: 120.2 kg (264 lb 15.9 oz)  Weight (lb): 265 lb  Ideal Body Weight (IBW), Male: 160 lb  % Ideal Body Weight, Male (lb): 165.63 %  BMI (Calculated): 39.1  BMI Grade: 35 - 39.9 - obesity - grade II    Lab/Procedures/Meds    Pertinent Labs Reviewed: reviewed  Pertinent Labs Comments: A1C >14  Pertinent Medications Reviewed: reviewed    Estimated/Assessed Needs    Weight Used For Calorie Calculations: 120.2 kg (264 lb 15.9 oz)    Energy Calorie Requirements (kcal): 1927 kcal/d  Energy Need Method: [place] (1.0 PAL)    Protein " Requirements: 108 g/d (.9 g/kg)  Weight Used For Protein Calculations: 120.2 kg (264 lb 15.9 oz)    Estimated Fluid Requirement Method: other (see comments) (Per MD)  RDA Method (mL): 1927    Nutrition Prescription Ordered    Current Diet Order: 2000 kcal ADA    Evaluation of Received Nutrient/Fluid Intake    I/O: +3L since admit    Comments: LBM: 3/14    Tolerance: tolerating    Nutrition Risk    Level of Risk/Frequency of Follow-up:  (1x/week)     Monitor and Evaluation    Food and Nutrient Intake: food and beverage intake, energy intake  Food and Nutrient Adminstration: diet order  Physical Activity and Function: nutrition-related ADLs and IADLs  Anthropometric Measurements: weight, weight change  Biochemical Data, Medical Tests and Procedures: inflammatory profile, lipid profile, glucose/endocrine profile, gastrointestinal profile, electrolyte and renal panel  Nutrition-Focused Physical Findings: overall appearance     Nutrition Follow-Up    RD Follow-up?: Yes

## 2024-03-14 NOTE — ASSESSMENT & PLAN NOTE
75 y/o male with A-fib, uncontrolled DMII, HLD, HTN, chronic heel ulcerations c/b chronic osteomyelitis, chronic leg wounds admitted for DKA. ID consulted for chronic wounds/ulcerations and abx guidance as with hx of MDROs.     Podiatry obtained left heel wound cultures (probes to bone). Cx + Proteus mirabilis, E. Cloaecae and now Fusarium and C. Albicans. Patient has been on IV Ertapenem with plans for six weeks of treatment at LTAC. ID re-consulted due to fungal cultures returning positive.      Recommendations  Continue IV Ertapenem x 6 weeks for acute on chronic osteomyelitis   Start IV Voriconazole 6 mg/kg IV q 12 hours x 2 doses followed by Voriconazole 200 mg PO BID   Would continue having discussions regarding proximal amputation as this will achieve source control and long term antimicrobials will not be indicated.  ID will follow.

## 2024-03-14 NOTE — PLAN OF CARE
"[facility] - Tele[facility] Stepdown  Podiatry  Care Update    Patient was briefly visited bedside by Podiatry before being transported to ultrasound.  As per yesterday's discussion, reiterated to patient that podiatry recommends a more proximal amputation as there is limited treatment from Podiatry perspective otherwise. As with yesterday's discussion entailing he would come to a decision in the next 24 hours, he states today "I'm still thinking about it."        Assessment/Plan:      ID  * Osteomyelitis of left lower extremity  Saji Castañeda is a 74 y.o. male who is status post left lower extremity toe part amputation.  Now presenting for acute infection, with acute on chronic osteomyelitis of the left calcaneus. Arterial ultrasound shows no hemodynamically significant stenosis. Left MRI shows osteomyelitis. At this time there is no more proximal amputation that Podiatry can offer.      - Vascular surgery recommended BKA/AKA. Patient not amenable. Consider re-consult if patient is amenable.   - Abx per ID  - Continue dressing LLE with Hydrafera blue, Kerlex, and ACE wrap  - NWB, patient to use wheelchair  - All other care per primary      Podiatry will sign off, recommending continuing wound care. Thank you for involving us in the care of this patient. Please reach out for any new, worsening pedal symptoms.     Future discharge recs:  Patient to follow up in Podiatry Clinic outpatient, Podiatry will arrange.  SNF or HH to apply bandaging as described above  Abx plan per ID  Patient to transfer via wheelchair  Patient to keep dressings clean dry and intact  Patient explained the importance of daily foot checks      Gagan Franklin DPM PGY-1  Podiatric Medicine & Surgery  [facility]  Secure Chat Preferred  Pager: [phone]    "

## 2024-03-14 NOTE — SUBJECTIVE & OBJECTIVE
Interval History:   ID consulted as wound cultures are positive for fusarium and candida. Repeat . Started Voriconazole. Patient being discharged to LTAC.    Review of Systems   Constitutional:  Negative for chills, diaphoresis, fatigue and fever.   HENT:  Negative for congestion, rhinorrhea and sore throat.    Respiratory:  Negative for cough and shortness of breath.    Cardiovascular:  Negative for chest pain and leg swelling.   Gastrointestinal:  Negative for abdominal pain, constipation, nausea and vomiting.   Genitourinary:  Negative for dysuria, frequency and urgency.   Musculoskeletal:  Positive for gait problem and myalgias. Negative for arthralgias, back pain and neck pain.   Skin:  Positive for color change and wound.   Neurological:  Negative for weakness, numbness and headaches.     Objective:     Vital Signs (Most Recent):  Temp: 98.3 °F (36.8 °C) (03/14/24 0650)  Pulse: 80 (03/14/24 0650)  Resp: 16 (03/14/24 0650)  BP: 115/60 (03/14/24 0650)  SpO2: (!) 94 % (03/14/24 0650) Vital Signs (24h Range):  Temp:  [98.3 °F (36.8 °C)-99.8 °F (37.7 °C)] 98.3 °F (36.8 °C)  Pulse:  [52-89] 80  Resp:  [16-18] 16  SpO2:  [92 %-99 %] 94 %  BP: (115-158)/(60-72) 115/60     Weight: 120.2 kg (265 lb)  Body mass index is 39.13 kg/m².    Estimated Creatinine Clearance: 58.4 mL/min (based on SCr of 1.4 mg/dL).     Physical Exam  Constitutional:       General: He is not in acute distress.     Appearance: Normal appearance. He is well-developed. He is not ill-appearing or diaphoretic.   HENT:      Head: Normocephalic and atraumatic.      Right Ear: External ear normal.      Left Ear: External ear normal.      Nose: Nose normal.   Eyes:      General: No scleral icterus.        Right eye: No discharge.         Left eye: No discharge.      Conjunctiva/sclera: Conjunctivae normal.   Pulmonary:      Effort: Pulmonary effort is normal. No respiratory distress.      Breath sounds: No stridor.   Skin:     General: Skin is dry.       Coloration: Skin is not jaundiced or pale.      Findings: No erythema.      Comments: See wound photos in media tab   Neurological:      General: No focal deficit present.      Mental Status: He is alert and oriented to person, place, and time. Mental status is at baseline.   Psychiatric:         Mood and Affect: Mood normal.         Behavior: Behavior normal.         Thought Content: Thought content normal.         Judgment: Judgment normal.            Significant Labs: All pertinent labs within the past 24 hours have been reviewed.    Significant Imaging: I have reviewed all pertinent imaging results/findings within the past 24 hours.

## 2024-03-14 NOTE — PHYSICIAN QUERY
PT Name: Saji Castañeda  MR #: [id number]     DOCUMENTATION CLARIFICATION      CDS/: Kole Jaime RN, CCDS               Contact information:   [email or web address]    This form is a permanent document in the medical record.    Query Date: March 14, 2024    By submitting this query, we are merely seeking further clarification of documentation. Please utilize your independent clinical judgment when addressing the question(s) below.     The Medical Record contains the following:   Indicators   Supporting Clinical Findings Location in Medical Record   x Documentation of Sepsis, Septic Shock Severe sepsis  This patient does have evidence of infective focus  My overall impression is sepsis.  Source: Skin and Soft Tissue (location left leg)  IV: Antibiotics given- Vanco and Merrem  Organ dysfunction indicated by Acute kidney injury   Osteomyelitis - LLE with extensive foul smelling ulcerative lesions;     Problem:Osteomyelitis of left lower extremity;  Severe sepsis  This patient does have evidence of infective focus  My overall impression is sepsis.  Source: Skin and Soft Tissue (location left leg)  IV antibiotic: ertapenem  Cellulitis of left lower leg 3/5 H&P; [facility] ([facility])                  3/14 [facility], [facility]        Blood Culture      Respiratory Culture      Urine Culture     x Other Culture Left leg wound culture: many Candida albicans; fusarium species     Left leg wound culture:  few Proteus Mirabilis ; few Enterobacter Cloacae; skin bossamn also present    3/6 Micro       3/6 Micro    x Acute/Chronic Illness Principal Problem:DKA (diabetic ketoacidosis) ; Severe sepsis;  Osteomyelitis;     history of DM2, HLD, HTN, chronic osteomyelitis of L heel, L TMA, PAD, hx of ESBL klebsiella, acinetobacter bacteremia, presenting with hyperglycemia.    3/5  H&P: [facility]      3/5  H&P: [facility]   x Medication/Treatment Vanco, IV; 3/5;   Zosyn, IV; 3/5;  Augmentin, PO; 3/7-8  Meropenem, IV; 3/6;   Ertapenem, IV;  "3/13  Voriconazole, IV; 3/14   MAR  "  "  "  "  "    Other        Provider, please further specify the _     sepsis       _ diagnosis:   [ x  ] Due to Candida albicans; fusarium species ; Proteus Mirabilis; Enterobacter Cloacae   [   ] Other specification (please specify): ____________________   [   ] Clinically Undetermined       Please document in your progress notes daily for the duration of treatment until resolved, and include in your discharge summary.    Form No. [id number]    "

## 2024-03-14 NOTE — PROGRESS NOTES
[facility] - Telemetry [facility] Medicine  Progress Note    Patient Name: Saji Castañeda  MRN: [id number]  Patient Class: IP- Inpatient   Admission Date: 3/5/2024  Length of Stay: 9 days  Attending Physician: Mahendra Collins III*  Primary Care Provider: [facility] -        Subjective:     Principal Problem:Osteomyelitis of left lower extremity        HPI:  Saji Castañeda is a 74 y.o. male with a medical history of DM2, HLD, HTN, chronic osteomyelitis of L heel, L TMA, PAD, hx of ESBL klebsiella, acinetobacter bacteremia, presenting with hyperglycemia. He presented to the ED via EMS and his POCT glucose on arrival was >500. Serum blood glucose 667 with anion gap of 17 and elevated ketones. Serum potassium 3.4, corrected sodium 149. Urinalysis significant for RBCs, glucosuria, and moderate bacteria and yeast. CBC revealed leukocytosis, elevated platelets, and mild anemia. CMP shows Na 135, K 3.4, BUN 29, Cr 2.1, Glucose 677, Lactate 4.61. BNP and troponin elevated. GFR 32.2. Insulin drip, IV fluids, zosyn, vancomycin, and potassium given.      He is unable to provide much history, but states that he has not been taking his home medications for at least a week. He reports shortness of breath, fatigue, and weakness for the last 6-7 days. He has chills and reports episodes of emesis. He denies abdominal pain, chest pain, palpitations, recent illness/infection, fevers, changes to bowel movements and urinary output. Patient lives with his brother and sister at home. He was last seen in the ED on 2/21 after a fall. He was hypoglycemic BGL 60 at that time which returned to normal after eating. He was also scheduled for a wound clinic appointment today 3/5 at [facility] with Dr. Wilson.        Overview/Hospital Course:  Wound care and cultures collected by Podiatry with recs for Vascular consult for amputation. Patient declined amputation. ID recommending IV Ertapenem for chronic osteomyelitis for 6 week  duration (EED: 4/15/24). With high wound care needs and IV antibiotics plan patient needing long-term placement. He is being treated with topical permethrin for bedbugs. Wound care has been dressing the wounds. 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 Accepted to [facility], submitted for authorization.    Interval History: NAEN, VSS, This morning patient complained of right arm pain and swelling, ordered US shows ultrasound of right brachial vein around venous line.    Review Of System:  Constitutional:  Negative for activity change, appetite change and fever.   Respiratory:  Negative for cough, shortness of breath and wheezing.    Cardiovascular:  Negative for chest pain and leg swelling.   Gastrointestinal:  Negative for abdominal pain, constipation, diarrhea, nausea and vomiting.   Musculoskeletal:  Positive for arthralgias and myalgias.   Skin:  Negative for rash.   Neurological:  Negative for headaches.   Objective:     Vital Signs (Most Recent):  Temp: 98.3 °F (36.8 °C) (03/14/24 0650)  Pulse: 80 (03/14/24 0650)  Resp: 16 (03/14/24 0650)  BP: 115/60 (03/14/24 0650)  SpO2: (!) 94 % (03/14/24 0650) Vital Signs (24h Range):  Temp:  [98.3 °F (36.8 °C)-99.8 °F (37.7 °C)] 98.3 °F (36.8 °C)  Pulse:  [52-89] 80  Resp:  [16-19] 16  SpO2:  [92 %-99 %] 94 %  BP: (115-158)/(60-72) 115/60     Weight: 120.2 kg (265 lb)  Body mass index is 39.13 kg/m².    Intake/Output Summary (Last 24 hours) at 3/14/2024 0917  Last data filed at 3/14/2024 0606  Gross per 24 hour   Intake 250 ml   Output 850 ml   Net -600 ml         Physical Exam      Vitals and nursing note reviewed.   Constitutional:       Appearance: He is obese.   HENT:      Head: Normocephalic and atraumatic.   Eyes:      Conjunctiva/sclera: Conjunctivae normal.      Pupils: Pupils are equal, round, and reactive to light.   Cardiovascular:      Rate and Rhythm: Normal rate and regular rhythm.       Pulses: Normal pulses.   Pulmonary:      Effort: Pulmonary effort is normal. No respiratory distress.      Breath sounds: No wheezing or rales.   Abdominal:      Palpations: Abdomen is soft.      Tenderness: There is no abdominal tenderness. There is no guarding.   Musculoskeletal:      Comments: LLE wounds with dressing C/D/I   Skin:     General: Skin is warm and dry.   Neurological:      General: No focal deficit present.      Mental Status: He is alert and oriented to person, place, and time.   Psychiatric:         Mood and Affect: Mood normal.   Significant Labs: All pertinent labs within the past 24 hours have been reviewed.    Significant Imaging: I have reviewed all pertinent imaging results/findings within the past 24 hours.    Assessment/Plan:      * Osteomyelitis of left lower extremity  Patient with Proteus and enterobacter on leg cultures collected by Podiatry with concern for acute on chronic osteo. Fungal culture grew fusarium    - IV ertapenem with end date 4/15/24   - will add I/V voriconazole for 2 days and then switch to oral for fusarium coverage which grew on fungal culture, qtc normal  - midline catheters placed on admission for difficult IV access  -Still thinking about the amputation recommended by vascular surgery    Localized swelling of right upper extremity  Patient has swelling and pain in the right arm and some swelling in the left arm as well    -US b/l upper extremity for DVT   -Provoked DVT in right arm. Increase Eliquis to 10 mg BID for 7 days.      History of Bedbug bite with infection  Patient started on Permethrin daily for 5 days starting 3/12      Chronic anticoagulation  - See DVT      History of amputation of left high mid foot   See osteo      Stage 3b chronic kidney disease  Creatine stable for now. BMP reviewed- noted Estimated Creatinine Clearance: 58.4 mL/min (based on SCr of 1.4 mg/dL). according to latest data. Based on current GFR, CKD stage is stage 3 - GFR 30-59.   "Monitor UOP and serial BMP and adjust therapy as needed. Renally dose meds. Avoid nephrotoxic medications and procedures.    Severe sepsis  This patient does have evidence of infective focus  My overall impression is sepsis.  Source: Skin and Soft Tissue (location left leg)  Antibiotics given-   Antibiotics (72h ago, onward)      Start     Stop Route Frequency Ordered    03/08/24 1230  ertapenem (INVANZ) 1 g in sodium chloride 0.9 % 100 mL IVPB (MB+)         -- IV Every 24 hours (non-standard times) 03/08/24 1115          Latest lactate reviewed-  No results for input(s): "LACTATE", "POCLAC" in the last 72 hours.    Organ dysfunction indicated by Acute kidney injury    Fluid challenge Actual Body weight- Patient will receive 30ml/kg actual body weight to calculate fluid bolus for treatment of septic shock.     Post- resuscitation assessment No - Post resuscitation assessment not needed       Will Not start Pressors-     - See osteo      Other hyperlipidemia  Continue home atorvastatin      Diabetic ketoacidosis without coma associated with type 2 diabetes mellitus  Patient's FSGs are uncontrolled due to hyperglycemia on current medication regimen.  Last A1c reviewed-   Lab Results   Component Value Date    HGBA1C >14.0 (H) 03/05/2024     Most recent fingerstick glucose reviewed-   Recent Labs   Lab 03/13/24  0942 03/13/24  1235 03/13/24  1652 03/13/24  2154   POCTGLUCOSE 187* 260* 341* 166*       Current correctional scale   Insulin drip  Maintain anti-hyperglycemic dose as follows-   Antihyperglycemics (From admission, onward)      Start     Stop Route Frequency Ordered    03/11/24 1521  insulin aspart U-100 pen 0-5 Units         -- SubQ Before meals & nightly PRN 03/11/24 1520    03/07/24 1130  insulin aspart U-100 pen 10 Units         -- SubQ 3 times daily with meals 03/07/24 1028    03/05/24 1800  insulin detemir U-100 (Levemir) pen 30 Units         -- SubQ Nightly 03/05/24 1713          Hold Oral hypoglycemics " while patient is in the hospital.  Will keep patient on DKA pathway with insulin drip and fluid with protocol in place for switching to subcutaneous insulin as anion gap closes.      DKA  HHS  - Resolved  - Continue insulin regimen and BG checks TIDWM and QHS      Paroxysmal atrial fibrillation  Patient with Persistent (7 days or more) atrial fibrillation which is uncontrolled currently with    . Patient is currently in sinus rhythm.[id number] Score: 4. . Anticoagulation indicated. Anticoagulation done with lovenox as we could not get IV access for heparin  .    -apixaban    Chronic deep vein thrombosis (DVT) of right upper extremity  - Continue home AC      Cellulitis of left lower leg  See osteo      Peripheral arterial disease  Resume plavix      Essential hypertension  Chronic, uncontrolled. Latest blood pressure and vitals reviewed-     Temp:  [98.3 °F (36.8 °C)-99.8 °F (37.7 °C)]   Pulse:  [52-89]   Resp:  [16-19]   BP: (115-158)/(60-72)   SpO2:  [92 %-99 %] .   Home meds for hypertension were reviewed and noted below.   Hypertension Medications               furosemide (LASIX) 40 MG tablet Take 20 mg by mouth.    hydrALAZINE (APRESOLINE) 100 MG tablet Take 1 tablet (100 mg total) by mouth every 8 (eight) hours.    isosorbide dinitrate (ISORDIL) 10 MG tablet Take 1 tablet (10 mg total) by mouth 3 (three) times daily.    NIFEdipine (PROCARDIA-XL) 60 MG (OSM) 24 hr tablet Take 1 tablet (60 mg total) by mouth once daily.            While in the hospital, will manage blood pressure as follows; Adjust home antihypertensive regimen as follows- will keep hydralazine but hold valsartan and lasix    Will utilize p.r.n. blood pressure medication only if patient's blood pressure greater than 180/110 and he develops symptoms such as worsening chest pain or shortness of breath.      VTE Risk Mitigation (From admission, onward)           Ordered     apixaban tablet 5 mg  2 times daily         03/07/24 1810                     Discharge Planning   OXANA: 3/14/2024     Code Status: Full Code   Is the patient medically ready for discharge?: Yes    Reason for patient still in hospital (select all that apply): Treatment  Discharge Plan A: Skilled Nursing Facility   Discharge Delays: None known at this time              Lynette Perkins MD  Department of Hospital Medicine   [facility] - Telemetry Stepdown

## 2024-03-14 NOTE — ASSESSMENT & PLAN NOTE
Patient with Proteus and enterobacter on leg cultures collected by Podiatry with concern for acute on chronic osteo. Fungal culture grew fusarium    - IV ertapenem with end date 4/15/24   - Added voriconazole for fusarium coverage which grew on fungal culture  - midline catheters placed on admission for difficult IV access  -Still thinking about the amputation recommended by vascular surgery

## 2024-03-15 LAB
POCT GLUCOSE: 137 MG/DL (ref 70–110)
POCT GLUCOSE: 142 MG/DL (ref 70–110)
POCT GLUCOSE: 206 MG/DL (ref 70–110)
POCT GLUCOSE: 67 MG/DL (ref 70–110)

## 2024-03-15 PROCEDURE — 25000003 PHARM REV CODE 250

## 2024-03-15 PROCEDURE — 63600175 PHARM REV CODE 636 W HCPCS: Performed by: PHYSICIAN ASSISTANT

## 2024-03-15 PROCEDURE — 25000003 PHARM REV CODE 250: Performed by: PHYSICIAN ASSISTANT

## 2024-03-15 PROCEDURE — A4216 STERILE WATER/SALINE, 10 ML: HCPCS

## 2024-03-15 PROCEDURE — 97530 THERAPEUTIC ACTIVITIES: CPT | Mod: CO

## 2024-03-15 PROCEDURE — 20600001 HC STEP DOWN PRIVATE ROOM

## 2024-03-15 PROCEDURE — 27000207 HC ISOLATION

## 2024-03-15 PROCEDURE — 25000003 PHARM REV CODE 250: Performed by: INTERNAL MEDICINE

## 2024-03-15 PROCEDURE — 99233 SBSQ HOSP IP/OBS HIGH 50: CPT | Mod: ,,, | Performed by: PHYSICIAN ASSISTANT

## 2024-03-15 RX ORDER — VORICONAZOLE 200 MG/1
200 TABLET, FILM COATED ORAL 2 TIMES DAILY
Status: CANCELLED | OUTPATIENT
Start: 2024-03-15

## 2024-03-15 RX ORDER — INSULIN ASPART 100 [IU]/ML
12 INJECTION, SOLUTION INTRAVENOUS; SUBCUTANEOUS
Status: DISCONTINUED | OUTPATIENT
Start: 2024-03-16 | End: 2024-03-17

## 2024-03-15 RX ORDER — INSULIN ASPART 100 [IU]/ML
15 INJECTION, SOLUTION INTRAVENOUS; SUBCUTANEOUS
Status: DISCONTINUED | OUTPATIENT
Start: 2024-03-15 | End: 2024-03-15

## 2024-03-15 RX ORDER — VORICONAZOLE 200 MG/1
200 TABLET, FILM COATED ORAL 2 TIMES DAILY
Status: DISCONTINUED | OUTPATIENT
Start: 2024-03-15 | End: 2024-03-29

## 2024-03-15 RX ADMIN — VORICONAZOLE 200 MG: 200 TABLET, FILM COATED ORAL at 10:03

## 2024-03-15 RX ADMIN — TAMSULOSIN HYDROCHLORIDE 0.4 MG: 0.4 CAPSULE ORAL at 09:03

## 2024-03-15 RX ADMIN — GABAPENTIN 300 MG: 300 CAPSULE ORAL at 09:03

## 2024-03-15 RX ADMIN — INSULIN ASPART 15 UNITS: 100 INJECTION, SOLUTION INTRAVENOUS; SUBCUTANEOUS at 01:03

## 2024-03-15 RX ADMIN — INSULIN ASPART 2 UNITS: 100 INJECTION, SOLUTION INTRAVENOUS; SUBCUTANEOUS at 09:03

## 2024-03-15 RX ADMIN — Medication 16 G: at 04:03

## 2024-03-15 RX ADMIN — Medication 10 ML: at 06:03

## 2024-03-15 RX ADMIN — VORICONAZOLE 200 MG: 200 TABLET, FILM COATED ORAL at 08:03

## 2024-03-15 RX ADMIN — Medication 10 ML: at 12:03

## 2024-03-15 RX ADMIN — ACETAMINOPHEN 650 MG: 325 TABLET ORAL at 04:03

## 2024-03-15 RX ADMIN — INSULIN DETEMIR 30 UNITS: 100 INJECTION, SOLUTION SUBCUTANEOUS at 08:03

## 2024-03-15 RX ADMIN — GABAPENTIN 300 MG: 300 CAPSULE ORAL at 08:03

## 2024-03-15 RX ADMIN — ATORVASTATIN CALCIUM 40 MG: 40 TABLET, FILM COATED ORAL at 09:03

## 2024-03-15 RX ADMIN — INSULIN ASPART 10 UNITS: 100 INJECTION, SOLUTION INTRAVENOUS; SUBCUTANEOUS at 09:03

## 2024-03-15 RX ADMIN — PERMETHRIN: 50 CREAM TOPICAL at 09:03

## 2024-03-15 RX ADMIN — APIXABAN 10 MG: 5 TABLET, FILM COATED ORAL at 09:03

## 2024-03-15 RX ADMIN — CLOPIDOGREL BISULFATE 75 MG: 75 TABLET ORAL at 09:03

## 2024-03-15 RX ADMIN — PANTOPRAZOLE SODIUM 40 MG: 40 TABLET, DELAYED RELEASE ORAL at 09:03

## 2024-03-15 RX ADMIN — ERTAPENEM 1 G: 1 INJECTION INTRAMUSCULAR; INTRAVENOUS at 12:03

## 2024-03-15 RX ADMIN — APIXABAN 10 MG: 5 TABLET, FILM COATED ORAL at 08:03

## 2024-03-15 RX ADMIN — NIFEDIPINE 90 MG: 30 TABLET, FILM COATED, EXTENDED RELEASE ORAL at 09:03

## 2024-03-15 NOTE — SUBJECTIVE & OBJECTIVE
Interval History: REMINGTON DOMINGUEZ    Review of Systems    Constitutional:  Negative for activity change, appetite change and fever.   Respiratory:  Negative for cough, shortness of breath and wheezing.    Cardiovascular:  Negative for chest pain and leg swelling.   Gastrointestinal:  Negative for abdominal pain, constipation, diarrhea, nausea and vomiting.   Musculoskeletal:  Positive for arthralgias and myalgias.   Skin:  Negative for rash.   Neurological:  Negative for headaches.     Objective:     Vital Signs (Most Recent):  Temp: 97.6 °F (36.4 °C) (03/15/24 1208)  Pulse: 60 (03/15/24 1208)  Resp: 18 (03/15/24 1208)  BP: (!) 135/55 (03/15/24 1208)  SpO2: 95 % (03/15/24 1208) Vital Signs (24h Range):  Temp:  [97.6 °F (36.4 °C)-99.5 °F (37.5 °C)] 97.6 °F (36.4 °C)  Pulse:  [57-60] 60  Resp:  [16-20] 18  SpO2:  [92 %-95 %] 95 %  BP: (124-156)/(54-65) 135/55     Weight: 120.2 kg (264 lb 15.9 oz)  Body mass index is 39.13 kg/m².    Intake/Output Summary (Last 24 hours) at 3/15/2024 1215  Last data filed at 3/15/2024 0554  Gross per 24 hour   Intake 460 ml   Output 300 ml   Net 160 ml         Physical Exam      Vitals and nursing note reviewed.   Constitutional:       Appearance: He is obese.   HENT:      Head: Normocephalic and atraumatic.   Eyes:      Conjunctiva/sclera: Conjunctivae normal.      Pupils: Pupils are equal, round, and reactive to light.   Cardiovascular:      Rate and Rhythm: Normal rate and regular rhythm.      Pulses: Normal pulses.   Pulmonary:      Effort: Pulmonary effort is normal. No respiratory distress.      Breath sounds: No wheezing or rales.   Abdominal:      Palpations: Abdomen is soft.      Tenderness: There is no abdominal tenderness. There is no guarding.   Musculoskeletal:      Comments: LLE wounds with dressing C/D/I  Swelling of the arms B/L  Skin:     General: Skin is warm and dry.   Neurological:      General: No focal deficit present.      Mental Status: He is alert and oriented to  person, place, and time.   Psychiatric:         Mood and Affect: Mood normal.   Significant Labs: All pertinent labs within the past 24 hours have been reviewed.    Significant Imaging: I have reviewed all pertinent imaging results/findings within the past 24 hours.

## 2024-03-15 NOTE — SUBJECTIVE & OBJECTIVE
Interval History:   ID consulted as wound cultures are positive for fusarium and candida. Repeat . Started Voriconazole. Patient tentatively planning to be discharged to LTAC.    Review of Systems   Constitutional:  Negative for chills, diaphoresis, fatigue and fever.   HENT:  Negative for congestion, rhinorrhea and sore throat.    Respiratory:  Negative for cough and shortness of breath.    Cardiovascular:  Negative for chest pain and leg swelling.   Gastrointestinal:  Negative for abdominal pain, constipation, nausea and vomiting.   Genitourinary:  Negative for dysuria, frequency and urgency.   Musculoskeletal:  Positive for gait problem and myalgias. Negative for arthralgias, back pain and neck pain.   Skin:  Positive for color change and wound.   Neurological:  Negative for weakness, numbness and headaches.     Objective:     Vital Signs (Most Recent):  Temp: 97.6 °F (36.4 °C) (03/15/24 1208)  Pulse: 60 (03/15/24 1208)  Resp: 18 (03/15/24 1208)  BP: (!) 135/55 (03/15/24 1208)  SpO2: 95 % (03/15/24 1208) Vital Signs (24h Range):  Temp:  [97.6 °F (36.4 °C)-99.5 °F (37.5 °C)] 97.6 °F (36.4 °C)  Pulse:  [57-60] 60  Resp:  [16-20] 18  SpO2:  [92 %-95 %] 95 %  BP: (124-156)/(54-65) 135/55     Weight: 120.2 kg (264 lb 15.9 oz)  Body mass index is 39.13 kg/m².    Estimated Creatinine Clearance: 58.4 mL/min (based on SCr of 1.4 mg/dL).     Physical Exam  Constitutional:       General: He is not in acute distress.     Appearance: Normal appearance. He is well-developed. He is not ill-appearing or diaphoretic.   HENT:      Head: Normocephalic and atraumatic.      Right Ear: External ear normal.      Left Ear: External ear normal.      Nose: Nose normal.   Eyes:      General: No scleral icterus.        Right eye: No discharge.         Left eye: No discharge.      Conjunctiva/sclera: Conjunctivae normal.   Pulmonary:      Effort: Pulmonary effort is normal. No respiratory distress.      Breath sounds: No stridor.    Skin:     General: Skin is dry.      Coloration: Skin is not jaundiced or pale.      Findings: No erythema.      Comments: See wound photos in media tab   Neurological:      General: No focal deficit present.      Mental Status: He is alert and oriented to person, place, and time. Mental status is at baseline.   Psychiatric:         Mood and Affect: Mood normal.         Behavior: Behavior normal.         Thought Content: Thought content normal.         Judgment: Judgment normal.            Significant Labs: All pertinent labs within the past 24 hours have been reviewed.    Significant Imaging: I have reviewed all pertinent imaging results/findings within the past 24 hours.

## 2024-03-15 NOTE — PROGRESS NOTES
[facility] - Telemetry [facility] Medicine  Progress Note    Patient Name: Saji Castañeda  MRN: [id number]  Patient Class: IP- Inpatient   Admission Date: 3/5/2024  Length of Stay: 10 days  Attending Physician: Mahendra Collins III*  Primary Care Provider: [facility] -        Subjective:     Principal Problem:Osteomyelitis of left lower extremity        HPI:  Saji Castañeda is a 74 y.o. male with a medical history of DM2, HLD, HTN, chronic osteomyelitis of L heel, L TMA, PAD, hx of ESBL klebsiella, acinetobacter bacteremia, presenting with hyperglycemia. He presented to the ED via EMS and his POCT glucose on arrival was >500. Serum blood glucose 667 with anion gap of 17 and elevated ketones. Serum potassium 3.4, corrected sodium 149. Urinalysis significant for RBCs, glucosuria, and moderate bacteria and yeast. CBC revealed leukocytosis, elevated platelets, and mild anemia. CMP shows Na 135, K 3.4, BUN 29, Cr 2.1, Glucose 677, Lactate 4.61. BNP and troponin elevated. GFR 32.2. Insulin drip, IV fluids, zosyn, vancomycin, and potassium given.      He is unable to provide much history, but states that he has not been taking his home medications for at least a week. He reports shortness of breath, fatigue, and weakness for the last 6-7 days. He has chills and reports episodes of emesis. He denies abdominal pain, chest pain, palpitations, recent illness/infection, fevers, changes to bowel movements and urinary output. Patient lives with his brother and sister at home. He was last seen in the ED on 2/21 after a fall. He was hypoglycemic BGL 60 at that time which returned to normal after eating. He was also scheduled for a wound clinic appointment today 3/5 at [facility] with Dr. Wilson.        Overview/Hospital Course:  Wound care and cultures collected by Podiatry with recs for Vascular consult for amputation. Patient declined amputation. ID recommending IV Ertapenem for chronic osteomyelitis for 6 week  duration (EED: 4/15/24). With high wound care needs and IV antibiotics plan patient needing long-term placement. He is being treated with topical permethrin for bedbugs. Wound care has been dressing the wounds. 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 LTAC authorization denied by [employer].       Interval History: [place], [employer] denied LTAC so will have peer-peer today.    Review of Systems    Constitutional:  Negative for activity change, appetite change and fever.   Respiratory:  Negative for cough, shortness of breath and wheezing.    Cardiovascular:  Negative for chest pain and leg swelling.   Gastrointestinal:  Negative for abdominal pain, constipation, diarrhea, nausea and vomiting.   Musculoskeletal:  Positive for arthralgias and myalgias.   Skin:  Negative for rash.   Neurological:  Negative for headaches.     Objective:     Vital Signs (Most Recent):  Temp: 97.6 °F (36.4 °C) (03/15/24 1208)  Pulse: 60 (03/15/24 1208)  Resp: 18 (03/15/24 1208)  BP: (!) 135/55 (03/15/24 1208)  SpO2: 95 % (03/15/24 1208) Vital Signs (24h Range):  Temp:  [97.6 °F (36.4 °C)-99.5 °F (37.5 °C)] 97.6 °F (36.4 °C)  Pulse:  [57-60] 60  Resp:  [16-20] 18  SpO2:  [92 %-95 %] 95 %  BP: (124-156)/(54-65) 135/55     Weight: 120.2 kg (264 lb 15.9 oz)  Body mass index is 39.13 kg/m².    Intake/Output Summary (Last 24 hours) at 3/15/2024 1215  Last data filed at 3/15/2024 0554  Gross per 24 hour   Intake 460 ml   Output 300 ml   Net 160 ml         Physical Exam      Vitals and nursing note reviewed.   Constitutional:       Appearance: He is obese.   HENT:      Head: Normocephalic and atraumatic.   Eyes:      Conjunctiva/sclera: Conjunctivae normal.      Pupils: Pupils are equal, round, and reactive to light.   Cardiovascular:      Rate and Rhythm: Normal rate and regular rhythm.      Pulses: Normal pulses.   Pulmonary:      Effort: Pulmonary effort is normal. No respiratory distress.       Breath sounds: No wheezing or rales.   Abdominal:      Palpations: Abdomen is soft.      Tenderness: There is no abdominal tenderness. There is no guarding.   Musculoskeletal:      Comments: LLE wounds with dressing C/D/I  Swelling of the arms B/L  Skin:     General: Skin is warm and dry.   Neurological:      General: No focal deficit present.      Mental Status: He is alert and oriented to person, place, and time.   Psychiatric:         Mood and Affect: Mood normal.   Significant Labs: All pertinent labs within the past 24 hours have been reviewed.    Significant Imaging: I have reviewed all pertinent imaging results/findings within the past 24 hours.    Assessment/Plan:      * Osteomyelitis of left lower extremity  Patient with Proteus and enterobacter on leg cultures collected by Podiatry with concern for acute on chronic osteo. Fungal culture grew fusarium    - IV ertapenem with end date 4/15/24   - Added voriconazole for fusarium coverage which grew on fungal culture  - midline catheters placed on admission for difficult IV access  -Still thinking about the amputation recommended by vascular surgery    Localized swelling of right upper extremity  Patient has swelling and pain in the right arm and some swelling in the left arm as well    -US b/l upper extremity for DVT showed clot, increased his eliquis from 5 mg to 10 mg and removed his central line.       History of Bedbug bite with infection  Patient started on Permethrin daily for 5 days starting 3/12      Chronic anticoagulation  - See DVT      History of amputation of left high mid foot   See osteo      Stage 3b chronic kidney disease  Creatine stable for now. BMP reviewed- noted Estimated Creatinine Clearance: 58.4 mL/min (based on SCr of 1.4 mg/dL). according to latest data. Based on current GFR, CKD stage is stage 3 - GFR 30-59.  Monitor UOP and serial BMP and adjust therapy as needed. Renally dose meds. Avoid nephrotoxic medications and  "procedures.    Severe sepsis  This patient does have evidence of infective focus  My overall impression is sepsis.  Source: Skin and Soft Tissue (location left leg)  Antibiotics given-   Antibiotics (72h ago, onward)      Start     Stop Route Frequency Ordered    03/08/24 1230  ertapenem (INVANZ) 1 g in sodium chloride 0.9 % 100 mL IVPB (MB+)         -- IV Every 24 hours (non-standard times) 03/08/24 1115          Latest lactate reviewed-  No results for input(s): "LACTATE", "POCLAC" in the last 72 hours.    Organ dysfunction indicated by Acute kidney injury    Fluid challenge Actual Body weight- Patient will receive 30ml/kg actual body weight to calculate fluid bolus for treatment of septic shock.     Post- resuscitation assessment No - Post resuscitation assessment not needed       Will Not start Pressors-     - See osteo      Other hyperlipidemia  Continue home atorvastatin      Diabetic ketoacidosis without coma associated with type 2 diabetes mellitus  Patient's FSGs are uncontrolled due to hyperglycemia on current medication regimen.  Last A1c reviewed-   Lab Results   Component Value Date    HGBA1C >14.0 (H) 03/05/2024     Most recent fingerstick glucose reviewed-   Recent Labs   Lab 03/13/24  0942 03/13/24  1235 03/13/24  1652 03/13/24  2154   POCTGLUCOSE 187* 260* 341* 166*       Current correctional scale   Insulin drip  Maintain anti-hyperglycemic dose as follows-   Antihyperglycemics (From admission, onward)      Start     Stop Route Frequency Ordered    03/11/24 1521  insulin aspart U-100 pen 0-5 Units         -- SubQ Before meals & nightly PRN 03/11/24 1520    03/07/24 1130  insulin aspart U-100 pen 10 Units         -- SubQ 3 times daily with meals 03/07/24 1028    03/05/24 1800  insulin detemir U-100 (Levemir) pen 30 Units         -- SubQ Nightly 03/05/24 1713          Hold Oral hypoglycemics while patient is in the hospital.  Will keep patient on DKA pathway with insulin drip and fluid with protocol " in place for switching to subcutaneous insulin as anion gap closes.      DKA  HHS  - Resolved  - Continue insulin regimen and BG checks TIDWM and QHS      Paroxysmal atrial fibrillation  Patient with Persistent (7 days or more) atrial fibrillation which is uncontrolled currently with    . Patient is currently in sinus rhythm.[id number] Score: 4. . Anticoagulation indicated. Anticoagulation done with lovenox as we could not get IV access for heparin  .    -apixaban    Chronic deep vein thrombosis (DVT) of right upper extremity  - Continue home AC      Cellulitis of left lower leg  See osteo      Peripheral arterial disease  Resume plavix      Essential hypertension  Chronic, uncontrolled. Latest blood pressure and vitals reviewed-     Temp:  [97.6 °F (36.4 °C)-99.5 °F (37.5 °C)]   Pulse:  [57-60]   Resp:  [16-20]   BP: (124-156)/(54-65)   SpO2:  [92 %-95 %] .   Home meds for hypertension were reviewed and noted below.   Hypertension Medications               furosemide (LASIX) 40 MG tablet Take 20 mg by mouth.    hydrALAZINE (APRESOLINE) 100 MG tablet Take 1 tablet (100 mg total) by mouth every 8 (eight) hours.    isosorbide dinitrate (ISORDIL) 10 MG tablet Take 1 tablet (10 mg total) by mouth 3 (three) times daily.    NIFEdipine (PROCARDIA-XL) 60 MG (OSM) 24 hr tablet Take 1 tablet (60 mg total) by mouth once daily.            While in the hospital, will manage blood pressure as follows; Adjust home antihypertensive regimen as follows- will keep hydralazine but hold valsartan and lasix    Will utilize p.r.n. blood pressure medication only if patient's blood pressure greater than 180/110 and he develops symptoms such as worsening chest pain or shortness of breath.      VTE Risk Mitigation (From admission, onward)           Ordered     apixaban tablet 5 mg  2 times daily        See Hyperspace for full Linked Orders Report.    03/15/24 0954     apixaban tablet 10 mg  2 times daily        See Hyperspace for full Linked  Orders Report.    03/15/24 0954                    Discharge Planning   OXANA: 3/14/2024     Code Status: Full Code   Is the patient medically ready for discharge?: Yes    Reason for patient still in hospital (select all that apply): Pending disposition  Discharge Plan A: Skilled Nursing Facility   Discharge Delays: None known at this time              Lynette Perkins MD  Department of Hospital Medicine   [facility] - Telemetry Stepdown

## 2024-03-15 NOTE — ASSESSMENT & PLAN NOTE
Chronic, uncontrolled. Latest blood pressure and vitals reviewed-     Temp:  [97.6 °F (36.4 °C)-99.5 °F (37.5 °C)]   Pulse:  [57-60]   Resp:  [16-20]   BP: (124-156)/(54-65)   SpO2:  [92 %-95 %] .   Home meds for hypertension were reviewed and noted below.   Hypertension Medications               furosemide (LASIX) 40 MG tablet Take 20 mg by mouth.    hydrALAZINE (APRESOLINE) 100 MG tablet Take 1 tablet (100 mg total) by mouth every 8 (eight) hours.    isosorbide dinitrate (ISORDIL) 10 MG tablet Take 1 tablet (10 mg total) by mouth 3 (three) times daily.    NIFEdipine (PROCARDIA-XL) 60 MG (OSM) 24 hr tablet Take 1 tablet (60 mg total) by mouth once daily.            While in the hospital, will manage blood pressure as follows; Adjust home antihypertensive regimen as follows- will keep hydralazine but hold valsartan and lasix    Will utilize p.r.n. blood pressure medication only if patient's blood pressure greater than 180/110 and he develops symptoms such as worsening chest pain or shortness of breath.

## 2024-03-15 NOTE — PLAN OF CARE
Problem: Adult Inpatient Plan of Care  Goal: Plan of Care Review  Outcome: Ongoing, Progressing  Goal: Patient-Specific Goal (Individualized)  Outcome: Ongoing, Progressing  Goal: Absence of Hospital-Acquired Illness or Injury  Outcome: Ongoing, Progressing  Goal: Optimal Comfort and Wellbeing  Outcome: Ongoing, Progressing  Goal: Readiness for Transition of Care  Outcome: Ongoing, Progressing     Problem: Diabetes Comorbidity  Goal: Blood Glucose Level Within Targeted Range  Outcome: Ongoing, Progressing     Problem: Adjustment to Illness (Sepsis/Septic Shock)  Goal: Optimal Coping  Outcome: Ongoing, Progressing     Problem: Bleeding (Sepsis/Septic Shock)  Goal: Absence of Bleeding  Outcome: Ongoing, Progressing     Problem: Glycemic Control Impaired (Sepsis/Septic Shock)  Goal: Blood Glucose Level Within Desired Range  Outcome: Ongoing, Progressing     Problem: Nutrition Impaired (Sepsis/Septic Shock)  Goal: Optimal Nutrition Intake  Outcome: Ongoing, Progressing     Problem: Infection  Goal: Absence of Infection Signs and Symptoms  Outcome: Ongoing, Progressing     Problem: Impaired Wound Healing  Goal: Optimal Wound Healing  Outcome: Ongoing, Progressing     Problem: Skin Injury Risk Increased  Goal: Skin Health and Integrity  Outcome: Ongoing, Progressing     Problem: Fall Injury Risk  Goal: Absence of Fall and Fall-Related Injury  Outcome: Ongoing, Progressing   Mr. Castañeda is currently in bed with all safety measures in place. Vs have been stable for the day. Blood sugar did drop and held scheduled insulin and md was informed. Gave PRN meds and pt has been stable. Wound care was completed on legs and images uploaded into chart. Will continue to monitor for any changes in care.

## 2024-03-15 NOTE — ASSESSMENT & PLAN NOTE
Patient with Persistent (7 days or more) atrial fibrillation which is uncontrolled currently with    . Patient is currently in sinus rhythm.[id number] Score: 4. . Anticoagulation indicated. Anticoagulation done with lovenox as we could not get IV access for heparin  .    -apixaban

## 2024-03-15 NOTE — ASSESSMENT & PLAN NOTE
73 y/o male with A-fib, uncontrolled DMII, HLD, HTN, chronic heel ulcerations c/b chronic osteomyelitis, chronic leg wounds admitted for DKA. ID consulted for infected chronic wounds/ulcerations and abx guidance as with hx of MDROs.     Podiatry obtained left heel wound cultures (probes to bone). Cx + Proteus mirabilis, E. Cloaecae and now Fusarium and C. Albicans. Patient has been on IV Ertapenem with plans for six weeks of treatment at LTAC. ID re-consulted due to fungal cultures returning positive.      Recommendations  Continue IV Ertapenem x 6 weeks for acute on chronic osteomyelitis   Start IV Voriconazole 6 mg/kg IV  x 2 doses followed by Voriconazole 200 mg PO BID   Would continue having discussions regarding proximal amputation as this will achieve source control and long term antimicrobials will not be indicated. If patient refuses amputation, see OPAT plan below  Per primary team, patient being discharged to LTAC. Please consult ID at LTAC to follow patient and patient's labs.   Discussed with ID staff. ID will sign off.            Outpatient Antibiotic Therapy Plan:      1) Infection: osteomyelitis     2) Discharge Antibiotics:    Intravenous antibiotics:  Ertapenem 1 g IV q 24 hours    Oral antifungal:  Voriconazole 200 mg PO BID    3) Therapy Duration:  6 weeks of IV antibiotics and 3 months of oral antifungal tx    Estimated end date of IV antibiotics: 04/15/24  Estimated end date of PO antifungal: 06/14/24    4) Outpatient Weekly Labs:    Order the following labs to be drawn on Mondays:   CBC  CMP   CRP    5) Fax Lab Results to Infectious Diseases Provider: Kimmy Payne    [facility] Fax Number: [phone]    6) Outpatient Infectious Diseases Follow-up    Follow-up appointment will be arranged by the ID clinic and will be found in the patient's appointments tab.    Prior to discharge, please ensure the patient's follow-up has been scheduled.    If there is still no follow-up scheduled prior  to discharge, please send an EPIC message to Jennifer Sapp in Infectious Diseases.

## 2024-03-15 NOTE — ASSESSMENT & PLAN NOTE
Patient has swelling and pain in the right arm and some swelling in the left arm as well    -US b/l upper extremity for DVT showed clot, increased his eliquis from 5 mg to 10 mg and removed his central line.

## 2024-03-15 NOTE — PLAN OF CARE
Problem: Adult Inpatient Plan of Care  Goal: Plan of Care Review  Outcome: Ongoing, Progressing     Problem: Adult Inpatient Plan of Care  Goal: Absence of Hospital-Acquired Illness or Injury  Outcome: Ongoing, Not Progressing  Goal: Optimal Comfort and Wellbeing  Outcome: Ongoing, Not Progressing  Goal: Readiness for Transition of Care  Outcome: Ongoing, Not Progressing     Problem: Diabetes Comorbidity  Goal: Blood Glucose Level Within Targeted Range  Outcome: Ongoing, Not Progressing

## 2024-03-15 NOTE — PT/OT/SLP PROGRESS
Physical Therapy  Continue Current Plan of Care     Patient Name:  Saji Castañeda   MRN:  [id number]  Admitting Diagnosis:  Osteomyelitis of left lower extremity   Recent Surgery: * No surgery found *    Admit Date: 3/5/2024  Length of Stay: 10 days    Patient not seen on this date, however per chart review pt continues to benefit from acute PT services. PT to follow up as POC allows. Please continue progressive mobility as appropriate.    Discharge Disposition Recommendation: Moderate intensity therapy    RODOLFO Sylvester  3/15/2024

## 2024-03-15 NOTE — PT/OT/SLP PROGRESS
Occupational Therapy   Treatment    Name: Saji Castañeda  MRN: [id number]  Admitting Diagnosis:  Osteomyelitis of left lower extremity       Recommendations:     Discharge Recommendations: Moderate Intensity Therapy  Discharge Equipment Recommendations:  lift device, hospital bed  Barriers to discharge:  Other (Comment) (increased skilled assistance required)    Assessment:     Saji Castañeda is a 74 y.o. male with a medical diagnosis of Osteomyelitis of left lower extremity.  He presents with the following performance deficits affecting function are weakness, impaired endurance, impaired sensation, gait instability, impaired functional mobility, impaired self care skills, impaired balance, decreased lower extremity function, decreased upper extremity function, decreased safety awareness, decreased ROM, impaired skin, pain.   Pt was pleasant and willing to participate this session although limited to increased pain.    Rehab Prognosis:  Good; patient would benefit from acute skilled OT services to address these deficits and reach maximum level of function.       Plan:     Patient to be seen 2 x/week to address the above listed problems via self-care/home management, therapeutic activities, therapeutic exercises, neuromuscular re-education  Plan of Care Expires: 04/06/24  Plan of Care Reviewed with: patient    Subjective     Chief Complaint: L LE pain  Patient/Family Comments/goals: Pt agreeable to tx session  Pain/Comfort:  Pain Rating 1: 8/10  Location - Side 1: Left  Location - Orientation 1: generalized  Location 1: leg  Pain Addressed 1: Reposition, Distraction, Cessation of Activity, Nurse notified    Objective:     Communicated with: nurse (Marion) prior to session.  Patient found supine with telemetry, pressure relief boots upon OT entry to room.  A client care conference was completed by the OTR and the TAYLOR prior to treatment by the TAYLOR to discuss the patient's POC and current status.   General Precautions:  Standard, fall, contact    Orthopedic Precautions:LLE non weight bearing, RLE weight bearing as tolerated  Braces:  (Darco R LE)  Respiratory Status: Room air     Occupational Performance:     Bed Mobility:    Patient completed Rolling/Turning to Right with maximal assistance, with side rail, and drawsheet  Patient completed Scooting/Bridging with maximal assistance, with side rail, and drawsheet      Functional Mobility/Transfers:  Not assessed this date      Activities of Daily Living:  Grooming: setup assistance to wash face with washcloth        [facility] 6 Click ADL: 12    Treatment & Education:  Pt performed bed mobility, functional mobility/transfers, and ADLs as documented above.   Pt performed supine 2# dowel exercises following therapist's verbal instructions and visual demonstration to improve B UE AROM, optimize joint mobility, and strength:  Pt performed the following dowel exercises   B shoulder flexion/ext  Bench press  B  elbow flexion    Pt educated and instructed on the following:  OT POC  Role of TAYLOR  Use of call bell for all assistance  NWB Precaution L LE  Addressed questions and /or concerns within TAYLOR scope of practice  Pt verbalized understanding   Patient left supineHOB elevated with all lines intact, call button in reach, bed alarm on, and nurse (Marion) notified. Pt appears NAD and all belongings placed within reach.    GOALS:   Multidisciplinary Problems       Occupational Therapy Goals          Problem: Occupational Therapy    Goal Priority Disciplines Outcome Interventions   Occupational Therapy Goal     OT, PT/OT Ongoing, Progressing    Description: Goals to be met by: 4/6/24     Patient will increase functional independence with ADLs by performing:    UE Dressing with Contact Guard Assistance.  LE Dressing with Maximum Assistance.  Grooming while EOB with Stand-by Assistance.  Toileting from bedside commode with Maximum Assistance for hygiene and clothing management.   Sitting at edge  of bed x5 minutes with Stand-by Assistance.  Rolling to Bilateral with Minimal Assistance.   Supine to sit with Minimal Assistance.  Stand pivot transfers with Minimal Assistance.  Toilet transfer to bedside commode with Maximum Assistance.  BUE strengthening exercise program 2x day with theraband/HEP worksheet                         Time Tracking:     OT Date of Treatment: 03/15/24  OT Start Time: 1524  OT Stop Time: 1535  OT Total Time (min): 11 min    Billable Minutes:Therapeutic Activity 11    OT/KATHLEEN: KATHLEEN     Number of KATHLEEN visits since last OT visit: 1    3/15/2024

## 2024-03-15 NOTE — PROGRESS NOTES
[facility] - Telemetry Stepdown  Infectious Disease  Progress Note    Patient Name: Saji Castañeda  MRN: [id number]  Admission Date: 3/5/2024  Length of Stay: 10 days  Attending Physician: Mahendra Collins III*  Primary Care Provider: [facility] -    Isolation Status: Contact  Assessment/Plan:      ID  Cellulitis of left lower leg  73 y/o male with A-fib, uncontrolled DMII, HLD, HTN, chronic heel ulcerations c/b chronic osteomyelitis, chronic leg wounds admitted for DKA. ID consulted for infected chronic wounds/ulcerations and abx guidance as with hx of MDROs.     Podiatry obtained left heel wound cultures (probes to bone). Cx + Proteus mirabilis, E. Cloaecae and now Fusarium and C. Albicans. Patient has been on IV Ertapenem with plans for six weeks of treatment at LTAC. ID re-consulted due to fungal cultures returning positive.      Recommendations  Continue IV Ertapenem x 6 weeks for acute on chronic osteomyelitis   Start IV Voriconazole 6 mg/kg IV  x 2 doses followed by Voriconazole 200 mg PO BID   Would continue having discussions regarding proximal amputation as this will achieve source control and long term antimicrobials will not be indicated. If patient refuses amputation, see OPAT plan below  Per primary team, patient being discharged to LTAC. Please consult ID at LTAC to follow patient and patient's labs.   Discussed with ID staff. ID will sign off.            Outpatient Antibiotic Therapy Plan:      1) Infection: osteomyelitis     2) Discharge Antibiotics:    Intravenous antibiotics:  Ertapenem 1 g IV q 24 hours    Oral antifungal:  Voriconazole 200 mg PO BID    3) Therapy Duration:  6 weeks of IV antibiotics and 3 months of oral antifungal tx    Estimated end date of IV antibiotics: 04/15/24  Estimated end date of PO antifungal: 06/14/24    4) Outpatient Weekly Labs:    Order the following labs to be drawn on Mondays:   CBC  CMP   CRP    5) Fax Lab Results to Infectious Diseases Provider:  Kimmy Payne    [facility] Fax Number: [phone]    6) Outpatient Infectious Diseases Follow-up    Follow-up appointment will be arranged by the ID clinic and will be found in the patient's appointments tab.    Prior to discharge, please ensure the patient's follow-up has been scheduled.    If there is still no follow-up scheduled prior to discharge, please send an EPIC message to Jennifer Sapp in Infectious Diseases.                Thank you for the consult. ID will sign off. Please secure chat for any questions.  Kimmy Payne PA-C      Subjective:     Principal Problem:Osteomyelitis of left lower extremity    HPI: 73 y/o male with a.fib, DMII uncontrolled, HLD, HTN, chronic heel ulcerations c/b chronic osteomyelitis, hx of  Acinetobacter and ESBL klebsiella admitted for DKA. ID consulted for chronic wounds/ulcerations and abx guidance as with hx of MDROs. Remained afebrile, no leukocytosis. Pt was given dose of meropenem. Blood cultures remain NGTD.   Interval History:   ID consulted as wound cultures are positive for fusarium and candida. Repeat . Started Voriconazole. Patient tentatively planning to be discharged to LTAC.    Review of Systems   Constitutional:  Negative for chills, diaphoresis, fatigue and fever.   HENT:  Negative for congestion, rhinorrhea and sore throat.    Respiratory:  Negative for cough and shortness of breath.    Cardiovascular:  Negative for chest pain and leg swelling.   Gastrointestinal:  Negative for abdominal pain, constipation, nausea and vomiting.   Genitourinary:  Negative for dysuria, frequency and urgency.   Musculoskeletal:  Positive for gait problem and myalgias. Negative for arthralgias, back pain and neck pain.   Skin:  Positive for color change and wound.   Neurological:  Negative for weakness, numbness and headaches.     Objective:     Vital Signs (Most Recent):  Temp: 97.6 °F (36.4 °C) (03/15/24 1208)  Pulse: 60 (03/15/24 1208)  Resp: 18  (03/15/24 1208)  BP: (!) 135/55 (03/15/24 1208)  SpO2: 95 % (03/15/24 1208) Vital Signs (24h Range):  Temp:  [97.6 °F (36.4 °C)-99.5 °F (37.5 °C)] 97.6 °F (36.4 °C)  Pulse:  [57-60] 60  Resp:  [16-20] 18  SpO2:  [92 %-95 %] 95 %  BP: (124-156)/(54-65) 135/55     Weight: 120.2 kg (264 lb 15.9 oz)  Body mass index is 39.13 kg/m².    Estimated Creatinine Clearance: 58.4 mL/min (based on SCr of 1.4 mg/dL).     Physical Exam  Constitutional:       General: He is not in acute distress.     Appearance: Normal appearance. He is well-developed. He is not ill-appearing or diaphoretic.   HENT:      Head: Normocephalic and atraumatic.      Right Ear: External ear normal.      Left Ear: External ear normal.      Nose: Nose normal.   Eyes:      General: No scleral icterus.        Right eye: No discharge.         Left eye: No discharge.      Conjunctiva/sclera: Conjunctivae normal.   Pulmonary:      Effort: Pulmonary effort is normal. No respiratory distress.      Breath sounds: No stridor.   Skin:     General: Skin is dry.      Coloration: Skin is not jaundiced or pale.      Findings: No erythema.      Comments: See wound photos in media tab   Neurological:      General: No focal deficit present.      Mental Status: He is alert and oriented to person, place, and time. Mental status is at baseline.   Psychiatric:         Mood and Affect: Mood normal.         Behavior: Behavior normal.         Thought Content: Thought content normal.         Judgment: Judgment normal.            Significant Labs: All pertinent labs within the past 24 hours have been reviewed.    Significant Imaging: I have reviewed all pertinent imaging results/findings within the past 24 hours.

## 2024-03-15 NOTE — PLAN OF CARE
LTAC authorization denied by [employer].   Peer to peer scheduled for 12:30 pm today with Dr. Walsh of [facility].

## 2024-03-16 LAB
ABO + RH BLD: NORMAL
ALBUMIN SERPL BCP-MCNC: 1.7 G/DL (ref 3.5–5.2)
ALP SERPL-CCNC: 76 U/L (ref 55–135)
ALT SERPL W/O P-5'-P-CCNC: 11 U/L (ref 10–44)
ANION GAP SERPL CALC-SCNC: 9 MMOL/L (ref 8–16)
AST SERPL-CCNC: 17 U/L (ref 10–40)
BASOPHILS # BLD AUTO: 0.06 K/UL (ref 0–0.2)
BASOPHILS NFR BLD: 0.7 % (ref 0–1.9)
BILIRUB SERPL-MCNC: 0.2 MG/DL (ref 0.1–1)
BLD GP AB SCN CELLS X3 SERPL QL: NORMAL
BLD PROD TYP BPU: NORMAL
BLOOD UNIT EXPIRATION DATE: NORMAL
BLOOD UNIT TYPE CODE: 6200
BLOOD UNIT TYPE: NORMAL
BUN SERPL-MCNC: 20 MG/DL (ref 8–23)
CALCIUM SERPL-MCNC: 8.4 MG/DL (ref 8.7–10.5)
CHLORIDE SERPL-SCNC: 105 MMOL/L (ref 95–110)
CO2 SERPL-SCNC: 26 MMOL/L (ref 23–29)
CODING SYSTEM: NORMAL
CREAT SERPL-MCNC: 1.5 MG/DL (ref 0.5–1.4)
CROSSMATCH INTERPRETATION: NORMAL
DIFFERENTIAL METHOD BLD: ABNORMAL
DISPENSE STATUS: NORMAL
EOSINOPHIL # BLD AUTO: 0.2 K/UL (ref 0–0.5)
EOSINOPHIL NFR BLD: 2.2 % (ref 0–8)
ERYTHROCYTE [DISTWIDTH] IN BLOOD BY AUTOMATED COUNT: 17.2 % (ref 11.5–14.5)
EST. GFR  (NO RACE VARIABLE): 48.2 ML/MIN/1.73 M^2
FERRITIN SERPL-MCNC: 144 NG/ML (ref 20–300)
GLUCOSE SERPL-MCNC: 138 MG/DL (ref 70–110)
HCT VFR BLD AUTO: 22.8 % (ref 40–54)
HCT VFR BLD AUTO: 26.4 % (ref 40–54)
HGB BLD-MCNC: 6.9 G/DL (ref 14–18)
HGB BLD-MCNC: 8 G/DL (ref 14–18)
IMM GRANULOCYTES # BLD AUTO: 0.04 K/UL (ref 0–0.04)
IMM GRANULOCYTES NFR BLD AUTO: 0.5 % (ref 0–0.5)
IRON SERPL-MCNC: 11 UG/DL (ref 45–160)
LYMPHOCYTES # BLD AUTO: 1.6 K/UL (ref 1–4.8)
LYMPHOCYTES NFR BLD: 17.6 % (ref 18–48)
MCH RBC QN AUTO: 24.4 PG (ref 27–31)
MCHC RBC AUTO-ENTMCNC: 30.3 G/DL (ref 32–36)
MCV RBC AUTO: 81 FL (ref 82–98)
MONOCYTES # BLD AUTO: 0.6 K/UL (ref 0.3–1)
MONOCYTES NFR BLD: 6.6 % (ref 4–15)
NEUTROPHILS # BLD AUTO: 6.4 K/UL (ref 1.8–7.7)
NEUTROPHILS NFR BLD: 72.4 % (ref 38–73)
NRBC BLD-RTO: 0 /100 WBC
NUM UNITS TRANS PACKED RBC: NORMAL
PLATELET # BLD AUTO: 604 K/UL (ref 150–450)
PMV BLD AUTO: 8.8 FL (ref 9.2–12.9)
POCT GLUCOSE: 109 MG/DL (ref 70–110)
POCT GLUCOSE: 113 MG/DL (ref 70–110)
POCT GLUCOSE: 136 MG/DL (ref 70–110)
POCT GLUCOSE: 164 MG/DL (ref 70–110)
POCT GLUCOSE: 51 MG/DL (ref 70–110)
POCT GLUCOSE: 51 MG/DL (ref 70–110)
POCT GLUCOSE: 92 MG/DL (ref 70–110)
POTASSIUM SERPL-SCNC: 4.2 MMOL/L (ref 3.5–5.1)
PROT SERPL-MCNC: 6.4 G/DL (ref 6–8.4)
RBC # BLD AUTO: 2.83 M/UL (ref 4.6–6.2)
SATURATED IRON: 6 % (ref 20–50)
SODIUM SERPL-SCNC: 140 MMOL/L (ref 136–145)
SPECIMEN OUTDATE: NORMAL
TOTAL IRON BINDING CAPACITY: 197 UG/DL (ref 250–450)
TRANSFERRIN SERPL-MCNC: 133 MG/DL (ref 200–375)
WBC # BLD AUTO: 8.79 K/UL (ref 3.9–12.7)

## 2024-03-16 PROCEDURE — P9016 RBC LEUKOCYTES REDUCED: HCPCS

## 2024-03-16 PROCEDURE — 25000003 PHARM REV CODE 250

## 2024-03-16 PROCEDURE — 27000207 HC ISOLATION

## 2024-03-16 PROCEDURE — 85014 HEMATOCRIT: CPT

## 2024-03-16 PROCEDURE — 86901 BLOOD TYPING SEROLOGIC RH(D): CPT

## 2024-03-16 PROCEDURE — 36415 COLL VENOUS BLD VENIPUNCTURE: CPT | Mod: XB

## 2024-03-16 PROCEDURE — 30233N1 TRANSFUSION OF NONAUTOLOGOUS RED BLOOD CELLS INTO PERIPHERAL VEIN, PERCUTANEOUS APPROACH: ICD-10-PCS | Performed by: FAMILY MEDICINE

## 2024-03-16 PROCEDURE — 63600175 PHARM REV CODE 636 W HCPCS: Performed by: PHYSICIAN ASSISTANT

## 2024-03-16 PROCEDURE — A4216 STERILE WATER/SALINE, 10 ML: HCPCS

## 2024-03-16 PROCEDURE — 36415 COLL VENOUS BLD VENIPUNCTURE: CPT

## 2024-03-16 PROCEDURE — 85025 COMPLETE CBC W/AUTO DIFF WBC: CPT

## 2024-03-16 PROCEDURE — 80053 COMPREHEN METABOLIC PANEL: CPT

## 2024-03-16 PROCEDURE — 85018 HEMOGLOBIN: CPT

## 2024-03-16 PROCEDURE — 25000003 PHARM REV CODE 250: Performed by: PHYSICIAN ASSISTANT

## 2024-03-16 PROCEDURE — 25000003 PHARM REV CODE 250: Performed by: INTERNAL MEDICINE

## 2024-03-16 PROCEDURE — 20600001 HC STEP DOWN PRIVATE ROOM

## 2024-03-16 PROCEDURE — 82728 ASSAY OF FERRITIN: CPT

## 2024-03-16 PROCEDURE — 83540 ASSAY OF IRON: CPT

## 2024-03-16 PROCEDURE — 86920 COMPATIBILITY TEST SPIN: CPT

## 2024-03-16 RX ORDER — HYDROCODONE BITARTRATE AND ACETAMINOPHEN 500; 5 MG/1; MG/1
TABLET ORAL
Status: DISCONTINUED | OUTPATIENT
Start: 2024-03-16 | End: 2024-03-18

## 2024-03-16 RX ADMIN — Medication 10 ML: at 12:03

## 2024-03-16 RX ADMIN — GABAPENTIN 300 MG: 300 CAPSULE ORAL at 10:03

## 2024-03-16 RX ADMIN — APIXABAN 10 MG: 5 TABLET, FILM COATED ORAL at 08:03

## 2024-03-16 RX ADMIN — Medication 16 G: at 04:03

## 2024-03-16 RX ADMIN — INSULIN ASPART 12 UNITS: 100 INJECTION, SOLUTION INTRAVENOUS; SUBCUTANEOUS at 10:03

## 2024-03-16 RX ADMIN — CLOPIDOGREL BISULFATE 75 MG: 75 TABLET ORAL at 10:03

## 2024-03-16 RX ADMIN — GABAPENTIN 300 MG: 300 CAPSULE ORAL at 08:03

## 2024-03-16 RX ADMIN — NIFEDIPINE 90 MG: 30 TABLET, FILM COATED, EXTENDED RELEASE ORAL at 10:03

## 2024-03-16 RX ADMIN — VORICONAZOLE 200 MG: 200 TABLET, FILM COATED ORAL at 08:03

## 2024-03-16 RX ADMIN — INSULIN ASPART 12 UNITS: 100 INJECTION, SOLUTION INTRAVENOUS; SUBCUTANEOUS at 12:03

## 2024-03-16 RX ADMIN — Medication 10 ML: at 06:03

## 2024-03-16 RX ADMIN — TAMSULOSIN HYDROCHLORIDE 0.4 MG: 0.4 CAPSULE ORAL at 10:03

## 2024-03-16 RX ADMIN — INSULIN DETEMIR 30 UNITS: 100 INJECTION, SOLUTION SUBCUTANEOUS at 08:03

## 2024-03-16 RX ADMIN — PANTOPRAZOLE SODIUM 40 MG: 40 TABLET, DELAYED RELEASE ORAL at 10:03

## 2024-03-16 RX ADMIN — PERMETHRIN: 50 CREAM TOPICAL at 12:03

## 2024-03-16 RX ADMIN — ATORVASTATIN CALCIUM 40 MG: 40 TABLET, FILM COATED ORAL at 10:03

## 2024-03-16 RX ADMIN — ERTAPENEM 1 G: 1 INJECTION INTRAMUSCULAR; INTRAVENOUS at 12:03

## 2024-03-16 RX ADMIN — INSULIN ASPART 0 UNITS: 100 INJECTION, SOLUTION INTRAVENOUS; SUBCUTANEOUS at 10:03

## 2024-03-16 RX ADMIN — VORICONAZOLE 200 MG: 200 TABLET, FILM COATED ORAL at 10:03

## 2024-03-16 RX ADMIN — APIXABAN 10 MG: 5 TABLET, FILM COATED ORAL at 10:03

## 2024-03-16 NOTE — SUBJECTIVE & OBJECTIVE
Interval History: overnight patient had a fever of 100 but this morning he is afebrile. Vitally stable. Continues eliquis 10 mg for 6 days and switch to 5 mg after 12 doses      Objective:     Vital Signs (Most Recent):  Temp: 99 °F (37.2 °C) (03/16/24 0811)  Pulse: 60 (03/16/24 0811)  Resp: 19 (03/16/24 0811)  BP: 119/64 (03/16/24 0811)  SpO2: (!) 90 % (03/16/24 0811) Vital Signs (24h Range):  Temp:  [97.6 °F (36.4 °C)-100.4 °F (38 °C)] 99 °F (37.2 °C)  Pulse:  [60-69] 60  Resp:  [18-19] 19  SpO2:  [90 %-96 %] 90 %  BP: (113-137)/(53-64) 119/64     Weight: 120.2 kg (264 lb 15.9 oz)  Body mass index is 39.13 kg/m².    Intake/Output Summary (Last 24 hours) at 3/16/2024 1022  Last data filed at 3/16/2024 0619  Gross per 24 hour   Intake --   Output 250 ml   Net -250 ml         Physical Exam  Vitals and nursing note reviewed.   Constitutional:       Appearance: He is obese.   HENT:      Head: Normocephalic and atraumatic.   Eyes:      Conjunctiva/sclera: Conjunctivae normal.      Pupils: Pupils are equal, round, and reactive to light.   Cardiovascular:      Rate and Rhythm: Normal rate and regular rhythm.      Pulses: Normal pulses.   Pulmonary:      Effort: Pulmonary effort is normal. No respiratory distress.      Breath sounds: No wheezing or rales.   Abdominal:      Palpations: Abdomen is soft.      Tenderness: There is no abdominal tenderness. There is no guarding.   Musculoskeletal:      Comments: LLE wounds with dressing C/D/I  Swelling of the arms B/L  Skin:     General: Skin is warm and dry.   Neurological:      General: No focal deficit present.      Mental Status: He is alert and oriented to person, place, and time.   Psychiatric:         Mood and Affect: Mood normal.       Significant Labs: All pertinent labs within the past 24 hours have been reviewed.    Significant Imaging: I have reviewed all pertinent imaging results/findings within the past 24 hours.

## 2024-03-16 NOTE — ASSESSMENT & PLAN NOTE
Patient's FSGs are uncontrolled due to hyperglycemia on current medication regimen.  Last A1c reviewed-   Lab Results   Component Value Date    HGBA1C >14.0 (H) 03/05/2024     Most recent fingerstick glucose reviewed-   Recent Labs   Lab 03/15/24  1208 03/15/24  1611 03/15/24  1927   POCTGLUCOSE 137* 67* 142*       Current correctional scale   Insulin drip  Maintain anti-hyperglycemic dose as follows-   Antihyperglycemics (From admission, onward)    Start     Stop Route Frequency Ordered    03/16/24 0715  insulin aspart U-100 pen 12 Units         -- SubQ 3 times daily with meals 03/15/24 1902    03/11/24 1521  insulin aspart U-100 pen 0-5 Units         -- SubQ Before meals & nightly PRN 03/11/24 1520    03/05/24 1800  insulin detemir U-100 (Levemir) pen 30 Units         -- SubQ Nightly 03/05/24 1713        Hold Oral hypoglycemics while patient is in the hospital.  Will keep patient on DKA pathway with insulin drip and fluid with protocol in place for switching to subcutaneous insulin as anion gap closes.      DKA  HHS  - Resolved  - Continue insulin regimen and BG checks TIDWM and QHS

## 2024-03-16 NOTE — PROGRESS NOTES
[facility] - Telemetry [facility] Medicine  Progress Note    Patient Name: Saji Castañeda  MRN: [id number]  Patient Class: IP- Inpatient   Admission Date: 3/5/2024  Length of Stay: 11 days  Attending Physician: Mahendra Collins III*  Primary Care Provider: [facility] -        Subjective:     Principal Problem:Osteomyelitis of left lower extremity        HPI:  Saji Castañeda is a 74 y.o. male with a medical history of DM2, HLD, HTN, chronic osteomyelitis of L heel, L TMA, PAD, hx of ESBL klebsiella, acinetobacter bacteremia, presenting with hyperglycemia. He presented to the ED via EMS and his POCT glucose on arrival was >500. Serum blood glucose 667 with anion gap of 17 and elevated ketones. Serum potassium 3.4, corrected sodium 149. Urinalysis significant for RBCs, glucosuria, and moderate bacteria and yeast. CBC revealed leukocytosis, elevated platelets, and mild anemia. CMP shows Na 135, K 3.4, BUN 29, Cr 2.1, Glucose 677, Lactate 4.61. BNP and troponin elevated. GFR 32.2. Insulin drip, IV fluids, zosyn, vancomycin, and potassium given.      He is unable to provide much history, but states that he has not been taking his home medications for at least a week. He reports shortness of breath, fatigue, and weakness for the last 6-7 days. He has chills and reports episodes of emesis. He denies abdominal pain, chest pain, palpitations, recent illness/infection, fevers, changes to bowel movements and urinary output. Patient lives with his brother and sister at home. He was last seen in the ED on 2/21 after a fall. He was hypoglycemic BGL 60 at that time which returned to normal after eating. He was also scheduled for a wound clinic appointment today 3/5 at [facility] with Dr. Wilson.        Overview/Hospital Course:  Wound care and cultures collected by Podiatry with recs for Vascular consult for amputation. Patient declined amputation. ID recommending IV Ertapenem for chronic osteomyelitis for 6 week  duration (EED: 4/15/24). With high wound care needs and IV antibiotics plan patient needing long-term placement. He is being treated with topical permethrin for bedbugs. Wound care has been dressing the wounds. 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 LTAC authorization denied by [employer]. Waiting for SNF placement.      Interval History: overnight patient had a fever of 100 but this morning he is afebrile. Vitally stable. Continues eliquis 10 mg for 6 days and switch to 5 mg after 12 doses. Hb 6.9 will give blood transfusion today.      Objective:     Vital Signs (Most Recent):  Temp: 99 °F (37.2 °C) (03/16/24 0811)  Pulse: 60 (03/16/24 0811)  Resp: 19 (03/16/24 0811)  BP: 119/64 (03/16/24 0811)  SpO2: (!) 90 % (03/16/24 0811) Vital Signs (24h Range):  Temp:  [97.6 °F (36.4 °C)-100.4 °F (38 °C)] 99 °F (37.2 °C)  Pulse:  [60-69] 60  Resp:  [18-19] 19  SpO2:  [90 %-96 %] 90 %  BP: (113-137)/(53-64) 119/64     Weight: 120.2 kg (264 lb 15.9 oz)  Body mass index is 39.13 kg/m².    Intake/Output Summary (Last 24 hours) at 3/16/2024 1022  Last data filed at 3/16/2024 0619  Gross per 24 hour   Intake --   Output 250 ml   Net -250 ml         Physical Exam  Vitals and nursing note reviewed.   Constitutional:       Appearance: He is obese.   HENT:      Head: Normocephalic and atraumatic.   Eyes:      Conjunctiva/sclera: Conjunctivae normal.      Pupils: Pupils are equal, round, and reactive to light.   Cardiovascular:      Rate and Rhythm: Normal rate and regular rhythm.      Pulses: Normal pulses.   Pulmonary:      Effort: Pulmonary effort is normal. No respiratory distress.      Breath sounds: No wheezing or rales.   Abdominal:      Palpations: Abdomen is soft.      Tenderness: There is no abdominal tenderness. There is no guarding.   Musculoskeletal:      Comments: LLE wounds with dressing C/D/I  Swelling of the arms B/L  Skin:     General: Skin is warm and dry.    Neurological:      General: No focal deficit present.      Mental Status: He is alert and oriented to person, place, and time.   Psychiatric:         Mood and Affect: Mood normal.       Significant Labs: All pertinent labs within the past 24 hours have been reviewed.    Significant Imaging: I have reviewed all pertinent imaging results/findings within the past 24 hours.    Assessment/Plan:      * Osteomyelitis of left lower extremity  Patient with Proteus and enterobacter on leg cultures collected by Podiatry with concern for acute on chronic osteo. Fungal culture grew fusarium    - IV ertapenem with end date 4/15/24   - Added voriconazole for fusarium coverage which grew on fungal culture  - midline catheters placed on admission for difficult IV access  -Still thinking about the amputation recommended by vascular surgery    Localized swelling of right upper extremity  Patient has swelling and pain in the right arm and some swelling in the left arm as well    -US b/l upper extremity for DVT showed clot, increased his eliquis from 5 mg to 10 mg and removed his central line.       History of Bedbug bite with infection  Patient started on Permethrin daily for 5 days starting 3/12      Chronic anticoagulation  - See DVT      History of amputation of left high mid foot   See osteo      Stage 3b chronic kidney disease  Creatine stable for now. BMP reviewed- noted Estimated Creatinine Clearance: 58.4 mL/min (based on SCr of 1.4 mg/dL). according to latest data. Based on current GFR, CKD stage is stage 3 - GFR 30-59.  Monitor UOP and serial BMP and adjust therapy as needed. Renally dose meds. Avoid nephrotoxic medications and procedures.    Severe sepsis  This patient does have evidence of infective focus  My overall impression is sepsis.  Source: Skin and Soft Tissue (location left leg)  Antibiotics given-   Antibiotics (72h ago, onward)      Start     Stop Route Frequency Ordered    03/08/24 1230  ertapenem (INVANZ) 1 g  "in sodium chloride 0.9 % 100 mL IVPB (MB+)         -- IV Every 24 hours (non-standard times) 03/08/24 1115          Latest lactate reviewed-  No results for input(s): "LACTATE", "POCLAC" in the last 72 hours.    Organ dysfunction indicated by Acute kidney injury    Fluid challenge Actual Body weight- Patient will receive 30ml/kg actual body weight to calculate fluid bolus for treatment of septic shock.     Post- resuscitation assessment No - Post resuscitation assessment not needed       Will Not start Pressors-     - See osteo      Other hyperlipidemia  Continue home atorvastatin      Diabetic ketoacidosis without coma associated with type 2 diabetes mellitus  Patient's FSGs are uncontrolled due to hyperglycemia on current medication regimen.  Last A1c reviewed-   Lab Results   Component Value Date    HGBA1C >14.0 (H) 03/05/2024     Most recent fingerstick glucose reviewed-   Recent Labs   Lab 03/15/24  1208 03/15/24  1611 03/15/24  1927   POCTGLUCOSE 137* 67* 142*       Current correctional scale   Insulin drip  Maintain anti-hyperglycemic dose as follows-   Antihyperglycemics (From admission, onward)      Start     Stop Route Frequency Ordered    03/16/24 0715  insulin aspart U-100 pen 12 Units         -- SubQ 3 times daily with meals 03/15/24 1902    03/11/24 1521  insulin aspart U-100 pen 0-5 Units         -- SubQ Before meals & nightly PRN 03/11/24 1520    03/05/24 1800  insulin detemir U-100 (Levemir) pen 30 Units         -- SubQ Nightly 03/05/24 1713          Hold Oral hypoglycemics while patient is in the hospital.  Will keep patient on DKA pathway with insulin drip and fluid with protocol in place for switching to subcutaneous insulin as anion gap closes.      DKA  HHS  - Resolved  - Continue insulin regimen and BG checks TIDWM and QHS      Paroxysmal atrial fibrillation  Patient with Persistent (7 days or more) atrial fibrillation which is uncontrolled currently with    . Patient is currently in sinus " rhythm.[id number] Score: 4. . Anticoagulation indicated. Anticoagulation done with lovenox as we could not get IV access for heparin  .    -apixaban 5 mg after finishing 12 doses of 10 mg eliquis for DVT    Chronic deep vein thrombosis (DVT) of right upper extremity  - Continue home AC      Cellulitis of left lower leg  See osteo      Peripheral arterial disease  Resume plavix      Essential hypertension  Chronic, uncontrolled. Latest blood pressure and vitals reviewed-     Temp:  [97.6 °F (36.4 °C)-100.4 °F (38 °C)]   Pulse:  [60-69]   Resp:  [18-19]   BP: (113-137)/(53-64)   SpO2:  [90 %-96 %] .   Home meds for hypertension were reviewed and noted below.   Hypertension Medications               furosemide (LASIX) 40 MG tablet Take 20 mg by mouth.    hydrALAZINE (APRESOLINE) 100 MG tablet Take 1 tablet (100 mg total) by mouth every 8 (eight) hours.    isosorbide dinitrate (ISORDIL) 10 MG tablet Take 1 tablet (10 mg total) by mouth 3 (three) times daily.    NIFEdipine (PROCARDIA-XL) 60 MG (OSM) 24 hr tablet Take 1 tablet (60 mg total) by mouth once daily.            While in the hospital, will manage blood pressure as follows; Adjust home antihypertensive regimen as follows- will keep hydralazine but hold valsartan and lasix    Will utilize p.r.n. blood pressure medication only if patient's blood pressure greater than 180/110 and he develops symptoms such as worsening chest pain or shortness of breath.      VTE Risk Mitigation (From admission, onward)           Ordered     apixaban tablet 5 mg  2 times daily        See Hyperspace for full Linked Orders Report.    03/15/24 0954     apixaban tablet 10 mg  2 times daily        See Hyperspace for full Linked Orders Report.    03/15/24 0954                    Discharge Planning   OXANA: 3/19/2024     Code Status: Full Code   Is the patient medically ready for discharge?: Yes    Reason for patient still in hospital (select all that apply): Pending disposition  Discharge  Plan A: Skilled Nursing Facility   Discharge Delays: None known at this time              Lynette Perkins MD  Department of Hospital Medicine   [facility] - Telemetry Stepdown

## 2024-03-16 NOTE — PLAN OF CARE
Problem: Adult Inpatient Plan of Care  Goal: Plan of Care Review  Outcome: Ongoing, Progressing  Goal: Patient-Specific Goal (Individualized)  Outcome: Ongoing, Progressing  Goal: Absence of Hospital-Acquired Illness or Injury  Outcome: Ongoing, Progressing  Goal: Optimal Comfort and Wellbeing  Outcome: Ongoing, Progressing  Goal: Readiness for Transition of Care  Outcome: Ongoing, Progressing     Problem: Diabetes Comorbidity  Goal: Blood Glucose Level Within Targeted Range  Outcome: Ongoing, Progressing     Problem: Adjustment to Illness (Sepsis/Septic Shock)  Goal: Optimal Coping  Outcome: Ongoing, Progressing     Problem: Bleeding (Sepsis/Septic Shock)  Goal: Absence of Bleeding  Outcome: Ongoing, Progressing     Problem: Glycemic Control Impaired (Sepsis/Septic Shock)  Goal: Blood Glucose Level Within Desired Range  Outcome: Ongoing, Progressing     Problem: Infection Progression (Sepsis/Septic Shock)  Goal: Absence of Infection Signs and Symptoms  Outcome: Ongoing, Progressing     Problem: Nutrition Impaired (Sepsis/Septic Shock)  Goal: Optimal Nutrition Intake  Outcome: Ongoing, Progressing     Problem: Infection  Goal: Absence of Infection Signs and Symptoms  Outcome: Ongoing, Progressing     Problem: Impaired Wound Healing  Goal: Optimal Wound Healing  Outcome: Ongoing, Progressing     Problem: Skin Injury Risk Increased  Goal: Skin Health and Integrity  Outcome: Ongoing, Progressing     Problem: Fall Injury Risk  Goal: Absence of Fall and Fall-Related Injury  Outcome: Ongoing, Progressing

## 2024-03-16 NOTE — ASSESSMENT & PLAN NOTE
Chronic, uncontrolled. Latest blood pressure and vitals reviewed-     Temp:  [97.6 °F (36.4 °C)-100.4 °F (38 °C)]   Pulse:  [60-69]   Resp:  [18-19]   BP: (113-137)/(53-64)   SpO2:  [90 %-96 %] .   Home meds for hypertension were reviewed and noted below.   Hypertension Medications               furosemide (LASIX) 40 MG tablet Take 20 mg by mouth.    hydrALAZINE (APRESOLINE) 100 MG tablet Take 1 tablet (100 mg total) by mouth every 8 (eight) hours.    isosorbide dinitrate (ISORDIL) 10 MG tablet Take 1 tablet (10 mg total) by mouth 3 (three) times daily.    NIFEdipine (PROCARDIA-XL) 60 MG (OSM) 24 hr tablet Take 1 tablet (60 mg total) by mouth once daily.            While in the hospital, will manage blood pressure as follows; Adjust home antihypertensive regimen as follows- will keep hydralazine but hold valsartan and lasix    Will utilize p.r.n. blood pressure medication only if patient's blood pressure greater than 180/110 and he develops symptoms such as worsening chest pain or shortness of breath.

## 2024-03-16 NOTE — ASSESSMENT & PLAN NOTE
Patient with Persistent (7 days or more) atrial fibrillation which is uncontrolled currently with    . Patient is currently in sinus rhythm.[id number] Score: 4. . Anticoagulation indicated. Anticoagulation done with lovenox as we could not get IV access for heparin  .    -apixaban 5 mg after finishing 12 doses of 10 mg eliquis for DVT

## 2024-03-17 LAB
POCT GLUCOSE: 116 MG/DL (ref 70–110)
POCT GLUCOSE: 155 MG/DL (ref 70–110)
POCT GLUCOSE: 181 MG/DL (ref 70–110)
POCT GLUCOSE: 65 MG/DL (ref 70–110)
POCT GLUCOSE: 78 MG/DL (ref 70–110)
VIT B12 SERPL-MCNC: 504 PG/ML (ref 210–950)

## 2024-03-17 PROCEDURE — 25000003 PHARM REV CODE 250: Performed by: INTERNAL MEDICINE

## 2024-03-17 PROCEDURE — 20600001 HC STEP DOWN PRIVATE ROOM

## 2024-03-17 PROCEDURE — 25000003 PHARM REV CODE 250: Performed by: PHYSICIAN ASSISTANT

## 2024-03-17 PROCEDURE — 27000207 HC ISOLATION

## 2024-03-17 PROCEDURE — 63600175 PHARM REV CODE 636 W HCPCS: Performed by: PHYSICIAN ASSISTANT

## 2024-03-17 PROCEDURE — 25000003 PHARM REV CODE 250

## 2024-03-17 PROCEDURE — 63600175 PHARM REV CODE 636 W HCPCS

## 2024-03-17 PROCEDURE — 36415 COLL VENOUS BLD VENIPUNCTURE: CPT

## 2024-03-17 PROCEDURE — A4216 STERILE WATER/SALINE, 10 ML: HCPCS

## 2024-03-17 PROCEDURE — 82607 VITAMIN B-12: CPT

## 2024-03-17 RX ORDER — INSULIN ASPART 100 [IU]/ML
10 INJECTION, SOLUTION INTRAVENOUS; SUBCUTANEOUS
Status: DISCONTINUED | OUTPATIENT
Start: 2024-03-18 | End: 2024-03-18

## 2024-03-17 RX ORDER — POLYETHYLENE GLYCOL 3350 17 G/17G
17 POWDER, FOR SOLUTION ORAL DAILY
Status: DISCONTINUED | OUTPATIENT
Start: 2024-03-18 | End: 2024-03-25

## 2024-03-17 RX ORDER — VORICONAZOLE 200 MG/1
200 TABLET, FILM COATED ORAL 2 TIMES DAILY
Qty: 178 TABLET | Refills: 0
Start: 2024-03-18 | End: 2024-04-03 | Stop reason: HOSPADM

## 2024-03-17 RX ORDER — SENNOSIDES 8.6 MG/1
8.6 TABLET ORAL DAILY
Status: DISCONTINUED | OUTPATIENT
Start: 2024-03-18 | End: 2024-03-25

## 2024-03-17 RX ORDER — ISOSORBIDE DINITRATE 10 MG/1
10 TABLET ORAL 3 TIMES DAILY
Qty: 90 TABLET | Refills: 11 | Status: ON HOLD
Start: 2024-03-17 | End: 2024-05-10

## 2024-03-17 RX ORDER — INSULIN GLARGINE 100 [IU]/ML
30 INJECTION, SOLUTION SUBCUTANEOUS DAILY
Refills: 0
Start: 2024-03-17 | End: 2024-04-03 | Stop reason: HOSPADM

## 2024-03-17 RX ADMIN — GABAPENTIN 300 MG: 300 CAPSULE ORAL at 09:03

## 2024-03-17 RX ADMIN — PANTOPRAZOLE SODIUM 40 MG: 40 TABLET, DELAYED RELEASE ORAL at 08:03

## 2024-03-17 RX ADMIN — INSULIN ASPART 12 UNITS: 100 INJECTION, SOLUTION INTRAVENOUS; SUBCUTANEOUS at 01:03

## 2024-03-17 RX ADMIN — Medication 10 ML: at 12:03

## 2024-03-17 RX ADMIN — Medication 10 ML: at 01:03

## 2024-03-17 RX ADMIN — NIFEDIPINE 90 MG: 30 TABLET, FILM COATED, EXTENDED RELEASE ORAL at 08:03

## 2024-03-17 RX ADMIN — VORICONAZOLE 200 MG: 200 TABLET, FILM COATED ORAL at 08:03

## 2024-03-17 RX ADMIN — INSULIN ASPART 12 UNITS: 100 INJECTION, SOLUTION INTRAVENOUS; SUBCUTANEOUS at 09:03

## 2024-03-17 RX ADMIN — ATORVASTATIN CALCIUM 40 MG: 40 TABLET, FILM COATED ORAL at 08:03

## 2024-03-17 RX ADMIN — TAMSULOSIN HYDROCHLORIDE 0.4 MG: 0.4 CAPSULE ORAL at 08:03

## 2024-03-17 RX ADMIN — APIXABAN 10 MG: 5 TABLET, FILM COATED ORAL at 08:03

## 2024-03-17 RX ADMIN — GABAPENTIN 300 MG: 300 CAPSULE ORAL at 08:03

## 2024-03-17 RX ADMIN — VORICONAZOLE 200 MG: 200 TABLET, FILM COATED ORAL at 09:03

## 2024-03-17 RX ADMIN — Medication 16 G: at 04:03

## 2024-03-17 RX ADMIN — CLOPIDOGREL BISULFATE 75 MG: 75 TABLET ORAL at 08:03

## 2024-03-17 RX ADMIN — Medication 10 ML: at 05:03

## 2024-03-17 RX ADMIN — ERTAPENEM 1 G: 1 INJECTION INTRAMUSCULAR; INTRAVENOUS at 02:03

## 2024-03-17 RX ADMIN — APIXABAN 10 MG: 5 TABLET, FILM COATED ORAL at 09:03

## 2024-03-17 NOTE — SUBJECTIVE & OBJECTIVE
Interval History: REMINGTON DOMINGUEZ,       Objective:     Vital Signs (Most Recent):  Temp: 99.9 °F (37.7 °C) (03/17/24 0730)  Pulse: 65 (03/17/24 1506)  Resp: 20 (03/17/24 1100)  BP: (!) 113/50 (03/17/24 1100)  SpO2: 96 % (03/17/24 1100) Vital Signs (24h Range):  Temp:  [98.3 °F (36.8 °C)-99.9 °F (37.7 °C)] 99.9 °F (37.7 °C)  Pulse:  [51-68] 65  Resp:  [18-26] 20  SpO2:  [87 %-97 %] 96 %  BP: (113-139)/(50-65) 113/50     Weight: 120.2 kg (264 lb 15.9 oz)  Body mass index is 39.13 kg/m².    Intake/Output Summary (Last 24 hours) at 3/17/2024 1526  Last data filed at 3/17/2024 0656  Gross per 24 hour   Intake 411.17 ml   Output 750 ml   Net -338.83 ml         Physical Exam      Vitals and nursing note reviewed.   Constitutional:       Appearance: He is obese. Lethargic and slumped over.   HENT:      Head: Normocephalic and atraumatic.   Eyes:      Conjunctiva/sclera: Conjunctivae normal.      Pupils: Pupils are equal, round, and reactive to light.   Cardiovascular:      Rate and Rhythm: Normal rate and regular rhythm.      Pulses: Normal pulses.   Pulmonary:      Effort: Pulmonary effort is normal. No respiratory distress.      Breath sounds: No wheezing or rales.   Abdominal:      Palpations: Abdomen is soft.      Tenderness: There is no abdominal tenderness. There is no guarding.   Musculoskeletal:      Comments: LLE wounds with dressing C/D/I  Swelling of the arms B/L  Skin:     General: Skin is warm and dry.   Neurological:      General: No focal deficit present.      Mental Status: He is alert and oriented to person, place, and time.   Psychiatric:         Mood and Affect: Mood agitated.       Significant Labs: All pertinent labs within the past 24 hours have been reviewed.    Significant Imaging: I have reviewed all pertinent imaging results/findings within the past 24 hours.

## 2024-03-17 NOTE — ASSESSMENT & PLAN NOTE
Chronic, uncontrolled. Latest blood pressure and vitals reviewed-     Temp:  [98.3 °F (36.8 °C)-99.9 °F (37.7 °C)]   Pulse:  [51-68]   Resp:  [18-26]   BP: (113-139)/(50-65)   SpO2:  [87 %-97 %] .   Home meds for hypertension were reviewed and noted below.   Hypertension Medications               furosemide (LASIX) 40 MG tablet Take 20 mg by mouth.    hydrALAZINE (APRESOLINE) 100 MG tablet Take 1 tablet (100 mg total) by mouth every 8 (eight) hours.    isosorbide dinitrate (ISORDIL) 10 MG tablet Take 1 tablet (10 mg total) by mouth 3 (three) times daily.    NIFEdipine (PROCARDIA-XL) 60 MG (OSM) 24 hr tablet Take 1 tablet (60 mg total) by mouth once daily.            While in the hospital, will manage blood pressure as follows; Adjust home antihypertensive regimen as follows- will keep hydralazine but hold valsartan and lasix    Will utilize p.r.n. blood pressure medication only if patient's blood pressure greater than 180/110 and he develops symptoms such as worsening chest pain or shortness of breath.

## 2024-03-17 NOTE — PLAN OF CARE
Problem: Adult Inpatient Plan of Care  Goal: Plan of Care Review  Outcome: Ongoing, Progressing  Goal: Patient-Specific Goal (Individualized)  Outcome: Ongoing, Progressing  Goal: Absence of Hospital-Acquired Illness or Injury  Outcome: Ongoing, Progressing  Goal: Optimal Comfort and Wellbeing  Outcome: Ongoing, Progressing  Goal: Readiness for Transition of Care  Outcome: Ongoing, Progressing     Problem: Diabetes Comorbidity  Goal: Blood Glucose Level Within Targeted Range  Outcome: Ongoing, Progressing     Problem: Adjustment to Illness (Sepsis/Septic Shock)  Goal: Optimal Coping  Outcome: Ongoing, Progressing     Problem: Bleeding (Sepsis/Septic Shock)  Goal: Absence of Bleeding  Outcome: Ongoing, Progressing     Problem: Glycemic Control Impaired (Sepsis/Septic Shock)  Goal: Blood Glucose Level Within Desired Range  Outcome: Ongoing, Progressing     Problem: Infection Progression (Sepsis/Septic Shock)  Goal: Absence of Infection Signs and Symptoms  Outcome: Ongoing, Progressing     Problem: Nutrition Impaired (Sepsis/Septic Shock)  Goal: Optimal Nutrition Intake  Outcome: Ongoing, Progressing     Problem: Infection  Goal: Absence of Infection Signs and Symptoms  Outcome: Ongoing, Progressing     Problem: Impaired Wound Healing  Goal: Optimal Wound Healing  Outcome: Ongoing, Progressing     Problem: Skin Injury Risk Increased  Goal: Skin Health and Integrity  Outcome: Ongoing, Progressing     Problem: Fall Injury Risk  Goal: Absence of Fall and Fall-Related Injury  Outcome: Ongoing, Progressing     Patient received 1 unit PRBCs. Patient's blood sugar decreased to 51; patient was treated per hypoglycemic protocol. Patient's blood sugar increased; encouraged to try to eat meals in their entirety.

## 2024-03-17 NOTE — ASSESSMENT & PLAN NOTE
Patient's FSGs are uncontrolled due to hyperglycemia on current medication regimen.  Last A1c reviewed-   Lab Results   Component Value Date    HGBA1C >14.0 (H) 03/05/2024     Most recent fingerstick glucose reviewed-   Recent Labs   Lab 03/16/24  1825 03/16/24  1953 03/17/24  0836 03/17/24  1309   POCTGLUCOSE 92 113* 181* 116*       Current correctional scale   Insulin drip  Maintain anti-hyperglycemic dose as follows-   Antihyperglycemics (From admission, onward)    Start     Stop Route Frequency Ordered    03/16/24 0715  insulin aspart U-100 pen 12 Units         -- SubQ 3 times daily with meals 03/15/24 1902    03/11/24 1521  insulin aspart U-100 pen 0-5 Units         -- SubQ Before meals & nightly PRN 03/11/24 1520    03/05/24 1800  insulin detemir U-100 (Levemir) pen 30 Units         -- SubQ Nightly 03/05/24 1713        Hold Oral hypoglycemics while patient is in the hospital.  Will keep patient on DKA pathway with insulin drip and fluid with protocol in place for switching to subcutaneous insulin as anion gap closes.      DKA  HHS  - Resolved  - Continue insulin regimen and BG checks TIDWM and QHS

## 2024-03-17 NOTE — PLAN OF CARE
Problem: Adult Inpatient Plan of Care  Goal: Plan of Care Review  Outcome: Ongoing, Progressing  Goal: Patient-Specific Goal (Individualized)  Outcome: Ongoing, Progressing  Goal: Absence of Hospital-Acquired Illness or Injury  Outcome: Ongoing, Progressing  Goal: Optimal Comfort and Wellbeing  Outcome: Ongoing, Progressing  Goal: Readiness for Transition of Care  Outcome: Ongoing, Progressing     Problem: Diabetes Comorbidity  Goal: Blood Glucose Level Within Targeted Range  Outcome: Ongoing, Progressing     Problem: Adjustment to Illness (Sepsis/Septic Shock)  Goal: Optimal Coping  Outcome: Ongoing, Progressing     Problem: Bleeding (Sepsis/Septic Shock)  Goal: Absence of Bleeding  Outcome: Ongoing, Progressing     Problem: Glycemic Control Impaired (Sepsis/Septic Shock)  Goal: Blood Glucose Level Within Desired Range  Outcome: Ongoing, Progressing     Problem: Infection Progression (Sepsis/Septic Shock)  Goal: Absence of Infection Signs and Symptoms  Outcome: Ongoing, Progressing     Problem: Infection  Goal: Absence of Infection Signs and Symptoms  Outcome: Ongoing, Progressing     Patient remains free from falls and injuries through out shift. Patient AAO and VSS. Patient denies chest pain and SOB. Glucose managed and treated as ordered throughout shift. Pt educated on importance of eating meals. Patient's family at bedside. Plan of care reviewed with patient. Patient verbalizes understanding of plan.  Will continue to monitor.

## 2024-03-17 NOTE — PROGRESS NOTES
[facility] - Tele[facility] Medicine  Progress Note    Patient Name: Saji Castañeda  MRN: [id number]  Patient Class: IP- Inpatient   Admission Date: 3/5/2024  Length of Stay: 12 days  Attending Physician: Mahendra Collins III*  Primary Care Provider: [facility] -        Subjective:     Principal Problem:Osteomyelitis of left lower extremity        HPI:  Saji Castañeda is a 74 y.o. male with a medical history of DM2, HLD, HTN, chronic osteomyelitis of L heel, L TMA, PAD, hx of ESBL klebsiella, acinetobacter bacteremia, presenting with hyperglycemia. He presented to the ED via EMS and his POCT glucose on arrival was >500. Serum blood glucose 667 with anion gap of 17 and elevated ketones. Serum potassium 3.4, corrected sodium 149. Urinalysis significant for RBCs, glucosuria, and moderate bacteria and yeast. CBC revealed leukocytosis, elevated platelets, and mild anemia. CMP shows Na 135, K 3.4, BUN 29, Cr 2.1, Glucose 677, Lactate 4.61. BNP and troponin elevated. GFR 32.2. Insulin drip, IV fluids, zosyn, vancomycin, and potassium given.      He is unable to provide much history, but states that he has not been taking his home medications for at least a week. He reports shortness of breath, fatigue, and weakness for the last 6-7 days. He has chills and reports episodes of emesis. He denies abdominal pain, chest pain, palpitations, recent illness/infection, fevers, changes to bowel movements and urinary output. Patient lives with his brother and sister at home. He was last seen in the ED on 2/21 after a fall. He was hypoglycemic BGL 60 at that time which returned to normal after eating. He was also scheduled for a wound clinic appointment today 3/5 at [facility] with Dr. Wilson.        Overview/Hospital Course:  Wound care and cultures collected by Podiatry with recs for Vascular consult for amputation. Patient declined amputation. ID recommending IV Ertapenem for chronic osteomyelitis for 6 week  duration (EED: 4/15/24). With high wound care needs and IV antibiotics plan patient needing long-term placement. He is being treated with topical permethrin for bedbugs. Wound care has been dressing the wounds. 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 LTAC authorization denied by [employer]. Waiting for SNF placement.      Interval History: REMINGTON DOMINGUEZ,       Objective:     Vital Signs (Most Recent):  Temp: 99.9 °F (37.7 °C) (03/17/24 0730)  Pulse: 65 (03/17/24 1506)  Resp: 20 (03/17/24 1100)  BP: (!) 113/50 (03/17/24 1100)  SpO2: 96 % (03/17/24 1100) Vital Signs (24h Range):  Temp:  [98.3 °F (36.8 °C)-99.9 °F (37.7 °C)] 99.9 °F (37.7 °C)  Pulse:  [51-68] 65  Resp:  [18-26] 20  SpO2:  [87 %-97 %] 96 %  BP: (113-139)/(50-65) 113/50     Weight: 120.2 kg (264 lb 15.9 oz)  Body mass index is 39.13 kg/m².    Intake/Output Summary (Last 24 hours) at 3/17/2024 1526  Last data filed at 3/17/2024 0656  Gross per 24 hour   Intake 411.17 ml   Output 750 ml   Net -338.83 ml         Physical Exam      Vitals and nursing note reviewed.   Constitutional:       Appearance: He is obese. Lethargic and slumped over.   HENT:      Head: Normocephalic and atraumatic.   Eyes:      Conjunctiva/sclera: Conjunctivae normal.      Pupils: Pupils are equal, round, and reactive to light.   Cardiovascular:      Rate and Rhythm: Normal rate and regular rhythm.      Pulses: Normal pulses.   Pulmonary:      Effort: Pulmonary effort is normal. No respiratory distress.      Breath sounds: No wheezing or rales.   Abdominal:      Palpations: Abdomen is soft.      Tenderness: There is no abdominal tenderness. There is no guarding.   Musculoskeletal:      Comments: LLE wounds with dressing C/D/I  Swelling of the arms B/L  Skin:     General: Skin is warm and dry.   Neurological:      General: No focal deficit present.      Mental Status: He is alert and oriented to person, place, and time.   Psychiatric:          Mood and Affect: Mood agitated.       Significant Labs: All pertinent labs within the past 24 hours have been reviewed.    Significant Imaging: I have reviewed all pertinent imaging results/findings within the past 24 hours.    Assessment/Plan:      * Osteomyelitis of left lower extremity  Patient with Proteus and enterobacter on leg cultures collected by Podiatry with concern for acute on chronic osteo. Fungal culture grew fusarium    - IV ertapenem with end date 4/15/24   - Added voriconazole for fusarium coverage which grew on fungal culture  - midline catheters placed on admission for difficult IV access  -Still thinking about the amputation recommended by vascular surgery    Localized swelling of right upper extremity  Patient has swelling and pain in the right arm and some swelling in the left arm as well    -US b/l upper extremity for DVT showed clot, increased his eliquis from 5 mg to 10 mg and removed his central line.       History of Bedbug bite with infection  Patient started on Permethrin daily for 5 days starting 3/12      Chronic anticoagulation  - See DVT      History of amputation of left high mid foot   See osteo      Stage 3b chronic kidney disease  Creatine stable for now. BMP reviewed- noted Estimated Creatinine Clearance: 54.5 mL/min (A) (based on SCr of 1.5 mg/dL (H)). according to latest data. Based on current GFR, CKD stage is stage 3 - GFR 30-59.  Monitor UOP and serial BMP and adjust therapy as needed. Renally dose meds. Avoid nephrotoxic medications and procedures.    Severe sepsis  This patient does have evidence of infective focus  My overall impression is sepsis.  Source: Skin and Soft Tissue (location left leg)  Antibiotics given-   Antibiotics (72h ago, onward)      Start     Stop Route Frequency Ordered    03/08/24 1230  ertapenem (INVANZ) 1 g in sodium chloride 0.9 % 100 mL IVPB (MB+)         -- IV Every 24 hours (non-standard times) 03/08/24 1115          Latest lactate  "reviewed-  No results for input(s): "LACTATE", "POCLAC" in the last 72 hours.    Organ dysfunction indicated by Acute kidney injury    Fluid challenge Actual Body weight- Patient will receive 30ml/kg actual body weight to calculate fluid bolus for treatment of septic shock.     Post- resuscitation assessment No - Post resuscitation assessment not needed       Will Not start Pressors-     - See osteo      Other hyperlipidemia  Continue home atorvastatin      Diabetic ketoacidosis without coma associated with type 2 diabetes mellitus  Patient's FSGs are uncontrolled due to hyperglycemia on current medication regimen.  Last A1c reviewed-   Lab Results   Component Value Date    HGBA1C >14.0 (H) 03/05/2024     Most recent fingerstick glucose reviewed-   Recent Labs   Lab 03/16/24  1825 03/16/24  1953 03/17/24  0836 03/17/24  1309   POCTGLUCOSE 92 113* 181* 116*       Current correctional scale   Insulin drip  Maintain anti-hyperglycemic dose as follows-   Antihyperglycemics (From admission, onward)      Start     Stop Route Frequency Ordered    03/16/24 0715  insulin aspart U-100 pen 12 Units         -- SubQ 3 times daily with meals 03/15/24 1902    03/11/24 1521  insulin aspart U-100 pen 0-5 Units         -- SubQ Before meals & nightly PRN 03/11/24 1520    03/05/24 1800  insulin detemir U-100 (Levemir) pen 30 Units         -- SubQ Nightly 03/05/24 1713          Hold Oral hypoglycemics while patient is in the hospital.  Will keep patient on DKA pathway with insulin drip and fluid with protocol in place for switching to subcutaneous insulin as anion gap closes.      DKA  HHS  - Resolved  - Continue insulin regimen and BG checks TIDWM and QHS      Paroxysmal atrial fibrillation  Patient with Persistent (7 days or more) atrial fibrillation which is uncontrolled currently with    . Patient is currently in sinus rhythm.[id number] Score: 4. . Anticoagulation indicated. Anticoagulation done with lovenox as we could not get IV " access for heparin  .    -apixaban 5 mg after finishing 12 doses of 10 mg eliquis for DVT    Chronic deep vein thrombosis (DVT) of right upper extremity  - Continue home AC      Cellulitis of left lower leg  See osteo      Peripheral arterial disease  Resume plavix      Essential hypertension  Chronic, uncontrolled. Latest blood pressure and vitals reviewed-     Temp:  [98.3 °F (36.8 °C)-99.9 °F (37.7 °C)]   Pulse:  [51-68]   Resp:  [18-26]   BP: (113-139)/(50-65)   SpO2:  [87 %-97 %] .   Home meds for hypertension were reviewed and noted below.   Hypertension Medications               furosemide (LASIX) 40 MG tablet Take 20 mg by mouth.    hydrALAZINE (APRESOLINE) 100 MG tablet Take 1 tablet (100 mg total) by mouth every 8 (eight) hours.    isosorbide dinitrate (ISORDIL) 10 MG tablet Take 1 tablet (10 mg total) by mouth 3 (three) times daily.    NIFEdipine (PROCARDIA-XL) 60 MG (OSM) 24 hr tablet Take 1 tablet (60 mg total) by mouth once daily.            While in the hospital, will manage blood pressure as follows; Adjust home antihypertensive regimen as follows- will keep hydralazine but hold valsartan and lasix    Will utilize p.r.n. blood pressure medication only if patient's blood pressure greater than 180/110 and he develops symptoms such as worsening chest pain or shortness of breath.      VTE Risk Mitigation (From admission, onward)           Ordered     apixaban tablet 5 mg  2 times daily        See Hyperspace for full Linked Orders Report.    03/15/24 0954     apixaban tablet 10 mg  2 times daily        See Hyperspace for full Linked Orders Report.    03/15/24 0954                    Discharge Planning   OXANA: 3/19/2024     Code Status: Full Code   Is the patient medically ready for discharge?: Yes    Reason for patient still in hospital (select all that apply): pending disposition  Discharge Plan A: Skilled Nursing Facility   Discharge Delays: None known at this time              Lynette Perkins  MD  Department of Hospital Medicine   [facility] - Telemetry Stepdown

## 2024-03-17 NOTE — ASSESSMENT & PLAN NOTE
Patient with Persistent (7 days or more) atrial fibrillation which is uncontrolled currently with    . Patient is currently in sinus rhythm.[id number] Score: 4. . Anticoagulation indicated. Anticoagulation done with lovenox as we could not get IV access for heparin  .    -apixaban 5 mg after finishing 12 doses of 10 mg eliquis for DVT

## 2024-03-18 LAB
ALBUMIN SERPL BCP-MCNC: 1.9 G/DL (ref 3.5–5.2)
ALP SERPL-CCNC: 69 U/L (ref 55–135)
ALT SERPL W/O P-5'-P-CCNC: 9 U/L (ref 10–44)
ANION GAP SERPL CALC-SCNC: 10 MMOL/L (ref 8–16)
AST SERPL-CCNC: 17 U/L (ref 10–40)
BASOPHILS # BLD AUTO: 0.05 K/UL (ref 0–0.2)
BASOPHILS NFR BLD: 0.5 % (ref 0–1.9)
BILIRUB SERPL-MCNC: 0.4 MG/DL (ref 0.1–1)
BUN SERPL-MCNC: 29 MG/DL (ref 8–23)
CALCIUM SERPL-MCNC: 8.2 MG/DL (ref 8.7–10.5)
CHLORIDE SERPL-SCNC: 101 MMOL/L (ref 95–110)
CO2 SERPL-SCNC: 24 MMOL/L (ref 23–29)
CREAT SERPL-MCNC: 1.9 MG/DL (ref 0.5–1.4)
DIFFERENTIAL METHOD BLD: ABNORMAL
EOSINOPHIL # BLD AUTO: 0 K/UL (ref 0–0.5)
EOSINOPHIL NFR BLD: 0.4 % (ref 0–8)
ERYTHROCYTE [DISTWIDTH] IN BLOOD BY AUTOMATED COUNT: 16.7 % (ref 11.5–14.5)
EST. GFR  (NO RACE VARIABLE): 36.3 ML/MIN/1.73 M^2
GLUCOSE SERPL-MCNC: 277 MG/DL (ref 70–110)
HCT VFR BLD AUTO: 25.9 % (ref 40–54)
HGB BLD-MCNC: 7.9 G/DL (ref 14–18)
IMM GRANULOCYTES # BLD AUTO: 0.03 K/UL (ref 0–0.04)
IMM GRANULOCYTES NFR BLD AUTO: 0.3 % (ref 0–0.5)
LYMPHOCYTES # BLD AUTO: 1 K/UL (ref 1–4.8)
LYMPHOCYTES NFR BLD: 10.7 % (ref 18–48)
MAGNESIUM SERPL-MCNC: 1.9 MG/DL (ref 1.6–2.6)
MCH RBC QN AUTO: 24.4 PG (ref 27–31)
MCHC RBC AUTO-ENTMCNC: 30.5 G/DL (ref 32–36)
MCV RBC AUTO: 80 FL (ref 82–98)
MONOCYTES # BLD AUTO: 0.5 K/UL (ref 0.3–1)
MONOCYTES NFR BLD: 5.4 % (ref 4–15)
NEUTROPHILS # BLD AUTO: 7.7 K/UL (ref 1.8–7.7)
NEUTROPHILS NFR BLD: 82.7 % (ref 38–73)
NRBC BLD-RTO: 0 /100 WBC
PHOSPHATE SERPL-MCNC: 4.5 MG/DL (ref 2.7–4.5)
PLATELET # BLD AUTO: 565 K/UL (ref 150–450)
PMV BLD AUTO: 8.9 FL (ref 9.2–12.9)
POCT GLUCOSE: 145 MG/DL (ref 70–110)
POCT GLUCOSE: 248 MG/DL (ref 70–110)
POCT GLUCOSE: 305 MG/DL (ref 70–110)
POCT GLUCOSE: 41 MG/DL (ref 70–110)
POCT GLUCOSE: 43 MG/DL (ref 70–110)
POCT GLUCOSE: 85 MG/DL (ref 70–110)
POCT GLUCOSE: 88 MG/DL (ref 70–110)
POTASSIUM SERPL-SCNC: 4.8 MMOL/L (ref 3.5–5.1)
PROT SERPL-MCNC: 6.3 G/DL (ref 6–8.4)
RBC # BLD AUTO: 3.24 M/UL (ref 4.6–6.2)
SARS-COV-2 RNA RESP QL NAA+PROBE: NOT DETECTED
SODIUM SERPL-SCNC: 135 MMOL/L (ref 136–145)
WBC # BLD AUTO: 9.36 K/UL (ref 3.9–12.7)

## 2024-03-18 PROCEDURE — 25000003 PHARM REV CODE 250

## 2024-03-18 PROCEDURE — 25000003 PHARM REV CODE 250: Performed by: PHYSICIAN ASSISTANT

## 2024-03-18 PROCEDURE — 94761 N-INVAS EAR/PLS OXIMETRY MLT: CPT

## 2024-03-18 PROCEDURE — 97530 THERAPEUTIC ACTIVITIES: CPT

## 2024-03-18 PROCEDURE — A4216 STERILE WATER/SALINE, 10 ML: HCPCS

## 2024-03-18 PROCEDURE — 85025 COMPLETE CBC W/AUTO DIFF WBC: CPT | Performed by: FAMILY MEDICINE

## 2024-03-18 PROCEDURE — 87635 SARS-COV-2 COVID-19 AMP PRB: CPT | Performed by: STUDENT IN AN ORGANIZED HEALTH CARE EDUCATION/TRAINING PROGRAM

## 2024-03-18 PROCEDURE — 63600175 PHARM REV CODE 636 W HCPCS: Performed by: PHYSICIAN ASSISTANT

## 2024-03-18 PROCEDURE — 36415 COLL VENOUS BLD VENIPUNCTURE: CPT | Mod: XB

## 2024-03-18 PROCEDURE — 63600175 PHARM REV CODE 636 W HCPCS

## 2024-03-18 PROCEDURE — 20600001 HC STEP DOWN PRIVATE ROOM

## 2024-03-18 PROCEDURE — 25000003 PHARM REV CODE 250: Performed by: INTERNAL MEDICINE

## 2024-03-18 PROCEDURE — 27000207 HC ISOLATION

## 2024-03-18 PROCEDURE — 83735 ASSAY OF MAGNESIUM: CPT | Performed by: FAMILY MEDICINE

## 2024-03-18 PROCEDURE — 86580 TB INTRADERMAL TEST: CPT | Performed by: STUDENT IN AN ORGANIZED HEALTH CARE EDUCATION/TRAINING PROGRAM

## 2024-03-18 PROCEDURE — 80053 COMPREHEN METABOLIC PANEL: CPT | Performed by: FAMILY MEDICINE

## 2024-03-18 PROCEDURE — 36415 COLL VENOUS BLD VENIPUNCTURE: CPT | Performed by: FAMILY MEDICINE

## 2024-03-18 PROCEDURE — 30200315 PPD INTRADERMAL TEST REV CODE 302: Performed by: STUDENT IN AN ORGANIZED HEALTH CARE EDUCATION/TRAINING PROGRAM

## 2024-03-18 PROCEDURE — 80285 DRUG ASSAY VORICONAZOLE: CPT

## 2024-03-18 PROCEDURE — 84100 ASSAY OF PHOSPHORUS: CPT | Performed by: FAMILY MEDICINE

## 2024-03-18 RX ORDER — INSULIN ASPART 100 [IU]/ML
10 INJECTION, SOLUTION INTRAVENOUS; SUBCUTANEOUS
Status: DISCONTINUED | OUTPATIENT
Start: 2024-03-19 | End: 2024-03-18

## 2024-03-18 RX ORDER — INSULIN ASPART 100 [IU]/ML
5 INJECTION, SOLUTION INTRAVENOUS; SUBCUTANEOUS
Status: DISCONTINUED | OUTPATIENT
Start: 2024-03-19 | End: 2024-03-20

## 2024-03-18 RX ADMIN — DEXTROSE MONOHYDRATE 125 ML: 100 INJECTION, SOLUTION INTRAVENOUS at 05:03

## 2024-03-18 RX ADMIN — Medication 10 ML: at 12:03

## 2024-03-18 RX ADMIN — INSULIN ASPART 10 UNITS: 100 INJECTION, SOLUTION INTRAVENOUS; SUBCUTANEOUS at 12:03

## 2024-03-18 RX ADMIN — SENNOSIDES 8.6 MG: 8.6 TABLET, FILM COATED ORAL at 09:03

## 2024-03-18 RX ADMIN — VORICONAZOLE 200 MG: 200 TABLET, FILM COATED ORAL at 09:03

## 2024-03-18 RX ADMIN — ERTAPENEM 1 G: 1 INJECTION INTRAMUSCULAR; INTRAVENOUS at 12:03

## 2024-03-18 RX ADMIN — CLOPIDOGREL BISULFATE 75 MG: 75 TABLET ORAL at 09:03

## 2024-03-18 RX ADMIN — INSULIN ASPART 10 UNITS: 100 INJECTION, SOLUTION INTRAVENOUS; SUBCUTANEOUS at 08:03

## 2024-03-18 RX ADMIN — POLYETHYLENE GLYCOL 3350 17 G: 17 POWDER, FOR SOLUTION ORAL at 09:03

## 2024-03-18 RX ADMIN — INSULIN ASPART 1 UNITS: 100 INJECTION, SOLUTION INTRAVENOUS; SUBCUTANEOUS at 02:03

## 2024-03-18 RX ADMIN — GABAPENTIN 300 MG: 300 CAPSULE ORAL at 09:03

## 2024-03-18 RX ADMIN — Medication 10 ML: at 06:03

## 2024-03-18 RX ADMIN — GABAPENTIN 300 MG: 300 CAPSULE ORAL at 08:03

## 2024-03-18 RX ADMIN — TUBERCULIN PURIFIED PROTEIN DERIVATIVE 5 UNITS: 5 INJECTION, SOLUTION INTRADERMAL at 02:03

## 2024-03-18 RX ADMIN — SODIUM CHLORIDE, POTASSIUM CHLORIDE, SODIUM LACTATE AND CALCIUM CHLORIDE 1000 ML: 600; 310; 30; 20 INJECTION, SOLUTION INTRAVENOUS at 12:03

## 2024-03-18 RX ADMIN — APIXABAN 10 MG: 5 TABLET, FILM COATED ORAL at 09:03

## 2024-03-18 RX ADMIN — PANTOPRAZOLE SODIUM 40 MG: 40 TABLET, DELAYED RELEASE ORAL at 09:03

## 2024-03-18 RX ADMIN — ATORVASTATIN CALCIUM 40 MG: 40 TABLET, FILM COATED ORAL at 09:03

## 2024-03-18 RX ADMIN — APIXABAN 10 MG: 5 TABLET, FILM COATED ORAL at 08:03

## 2024-03-18 RX ADMIN — INSULIN ASPART 4 UNITS: 100 INJECTION, SOLUTION INTRAVENOUS; SUBCUTANEOUS at 09:03

## 2024-03-18 RX ADMIN — VORICONAZOLE 200 MG: 200 TABLET, FILM COATED ORAL at 08:03

## 2024-03-18 RX ADMIN — NIFEDIPINE 90 MG: 30 TABLET, FILM COATED, EXTENDED RELEASE ORAL at 09:03

## 2024-03-18 RX ADMIN — TAMSULOSIN HYDROCHLORIDE 0.4 MG: 0.4 CAPSULE ORAL at 09:03

## 2024-03-18 NOTE — ASSESSMENT & PLAN NOTE
Patient with Proteus and enterobacter on leg cultures collected by Podiatry with concern for acute on chronic osteo. Fungal culture grew fusarium    - IV ertapenem with end date 4/15/24   - Added voriconazole for fusarium coverage which grew on fungal culture - 3 months  - midline catheters placed on admission for difficult IV access  -Still thinking about the amputation recommended by vascular surgery - has been given enough time at this point and demonstrates poor understanding, conversations about amputation have been unproductive for the past week

## 2024-03-18 NOTE — PLAN OF CARE
Saji Castañeda MRN:[id number] ([facility]#[id number]) (74 y.o. M) (Adm: 24)  [id number]  Patient Demographics    Patient Name  Saji Castañeda Legal Sex  Male          Age  1950 (74 y.o.) N   Address  [address] [place] [address] Phone  [phone] (Home)  [phone] (Work) *Preferred*     Patient Demographics    Address  [address] [place] [address] Phone  [phone] (Home)  [phone] (Work) *Preferred* E-mail Address  [email or web address]     PCP and Center    Primary Care Provider  [facility] Phone  [phone] Center  [facility]     Patient Contacts    Name Relation Home Work Mobile   Kandy Castañeda Sister   [phone]   January Nicholson Sister   [phone]   Noe Nicholson Brother [phone]       Documents on File     Status Date Received Description   Documents for the Patient   Notice of Privacy Pract Ackn Received () 12 hipaa   Insurance Documents Received 17 MEDICARE CARD A&B   Patient ID Received 17 [place] DL 14    Advance Directive Acknowledgement Not Received     Clinic Authorization Received () 12 consent & authorization   Outside Lab  12    Provider Based Acknowledgement Signed () 14    Outside Lab  12    Medical Leave or Disability Form  13    Outside Progress or Provider Notes  03/15/13    Outside Lab  13    Clinic Authorization Received () 13    Outside Lab  13    Outside Lab  13    ACO Accept Accepted 14    HIM YENNI Authorization  14    Medical Leave or Disability Form  14    HIM YENNI Authorization  14    HIM YENNI Authorization      HIM YENNI Authorization  14    HIM YENNI Authorization  14    Clinic Authorization Unable to Obtain () 10/02/14 patient was seen on different floor   Outside Lab  14    Insurance Documents Received 12/22/15 HUMANA MED GOLD    (Sp)    HIPAA Notice of Privacy Received 14 DID NOT PULL UP HIPAA FORM   Insurance Documents Received 07/15/15 [place] 14 G. Litolff   Insurance Documents Received 14 MEDICAID ACTIVE pg. 2   HIM YENNI Authorization  14 CONTINUED CARE   HIM YENNI Authorization  09/15/14 TREATMENT   Outside [facility]  14    Clinic Authorization Received () 10/29/14 Pt could not sign   Insurance Documents Received 14 MEDICAID QMB ACTIVE P/P  14 G. Litolff   Outside Lab  14    Clinic Authorization Received () 02/03/15    HIM YENNI Authorization  05/08/15 CONTINUED CARE   Insurance Documents Received 05/15/15 [place] P/P 5/15/15 G. Litolff   Insurance Documents Received 05/15/15 MEDICAID QMB   pg. 2   Patient ID      Patient ID Unable to Obtain 16 No new DL   Insurance Documents Unable to Obtain 16 No medicaid card   Insurance Documents Received 16 HUMANA CARD 2015   Insurance Documents Received 12/03/15 QMB MEDICAID ACTIVE P/P 12/3/15 Lorrie Tejeda   Plain Language Summary English Yes, Printed for Patient () 12/22/15    Insurance Documents Received 16 PPV   Plain Language Summary English No, Patient Declined Print () 16    Plain Language Summary English Yes, Printed for Patient () 16    Clinic Consents Received () 16 CONSENT   Plain Language Summary English No, Patient Declined Print () 16    Plain Language Summary English No, Patient Declined Print () 16    Plain Language Summary English No, Patient Declined Print () 16    Plain Language Summary English No, Patient Declined Print () 16    Plain Language Summary English No, Patient Declined Print () 16    Plain Language Summary English No, Patient Declined Print () 16    Insurance Documents Received 17 HUMANA 2015 ($10/$45)    Insurance Documents Received 16 humana 11/1/15 newest   Plain Language Summary English No, Patient Declined Print () 16    Plain Language Summary English No, Patient Declined Print () 16    Plain Language Summary English No, Patient Declined Print () 16    Plain Language Summary English No, Patient Declined Print () 16    Plain Language Summary English No, Patient Declined Print () 16    Plain Language Summary English No, Patient Declined Print () 16    Notice of Privacy Pract Ackn Signed 16 NOPPA/SELF   HIM YENNI Authorization  16 TREATMENT   Plain Language Summary English No, Patient Declined Print () 16    Plain Language Summary English No, Patient Declined Print () 16    HIM YENNI Authorization  16 LEGAL   Plain Language Summary English No, Patient Declined Print () 16    Plain Language Summary English No, Patient Declined Print () 16    Plain Language Summary English No, Patient Declined Print () 16    Plain Language Summary English No, Patient Declined Print () 16    Plain Language Summary English No, Patient Declined Print () 10/25/16    Plain Language Summary English Yes, Printed for Patient () 17    Clinic Authorization Signed () 17    HIM YENNI Authorization Received 17 HIM Auth for ReleaseID [id number]   Plain Language Summary English No, Patient Declined Print () 17    Plain Language Summary English No, Patient Declined Print () 17    Insurance Documents Received 17 DQI FORM   Plain Language Summary English No, Patient Declined Print () 17    Patient ID Unable to Obtain 17 No ID   Insurance Documents Received 17 DQI   [facility] Contracted Facility Disclosure Signed () 10/01/18    Plain Language Summary English Acknowledged ()  10/01/18    Plain Language Summary English Acknowledged () 10/15/18 Fa Info   HIM YENNI Authorization Received 10/17/18 HIM Auth for ReleaseID [id number]   HIM YENNI Authorization Received 10/17/18 HIM Auth for ReleaseID [id number]   Plain Language Summary English Acknowledged () 19    Plain Language Summary English Acknowledged () 20    Plain Language Summary English Acknowledged () 20    [facility] Contracted Facility Disclosure Signed 20    Clinic Authorization Signed () 20    Miscellaneous Documents clinic Received 20 KAYLYN KINSEY- WOUND CARE REFFERAL.PDF   Lab Other Clinic Received 20- cmp, esr, cbc, crp   Lab Received 20 CMP CBC ESR CRP // 2020   Outside Correspondence Received 21    Lab Received 21 labs collected on 2021 cbc, cmp, esr, crp   Lab Other Clinic Received 01/15/21 cmp, cbc w/diff, sed rate, 2020   Lab Received 21 cmp, cbc w/diff, sed rate, 2021   Plain Language Summary English Acknowledged () 21    Insurance Documents Unable to Obtain 21 pt did not have pic id   Plain Language Summary English Acknowledged () 21    Plain Language Summary English Acknowledged () 21    Notice of Privacy Pract Ackn Received 21    Outside [facility] Received 21    Plain Language Summary English Acknowledged () 21    Outside [facility] Received 21    Lab Received 21 labs collected on 3/5/2021 and 3/8/2021 cbc, cmp, esr, crp   Outside [facility] Received 03/15/21    Plain Language Summary English Acknowledged () 21    Plain Language Summary English Acknowledged () 21    Plain Language Summary English Acknowledged () 21    Insurance Documents Received () 21 Hum[employer] 2018   [facility] Contracted Facility Disclosure  Signed 21    Patient ID Unable to Obtain 21 pt did not have a current ID   Plain Language Summary English Acknowledged () 21    Plain Language Summary English Acknowledged () 21    Plain Language Summary English Acknowledged () 21 Plain Language Summary   Plain Language Summary English Acknowledged () 21 Plain Language Summary   Plain Language Summary English Acknowledged () 21    Plain Language Summary English Acknowledged () 21    HIM YENNI Authorization  21    HIM YENNI Authorization  21    Plain Language Summary English Acknowledged () 10/06/21    Coverage Attachment      Plain Language Summary English Acknowledged () 10/19/21    Plain Language Summary English Acknowledged () 21    Plain Language Summary English Acknowledged () 21    Clinic Authorization      Plain Language Summary English Acknowledged () 10/02/23    Plain Language Summary English Acknowledged () 22    Clinic Authorization Received () 22    Plain Language Summary English Acknowledged () 22    Plain Language Summary English Acknowledged () 22    Plain Language Summary English Acknowledged () 22    Plain Language Summary English Acknowledged () 22    Plain Language Summary English Acknowledged () 22    HIM YENNI Authorization  22    Plain Language Summary English Acknowledged () 22    DME Prescription Received 22    Plain Language Summary English Acknowledged () 22    Plain Language Summary English Acknowledged () 22    Plain Language Summary English Acknowledged () 22    [facility] Not Contracted Facility Disclosure      Plain Language Summary English Acknowledged () 22    Plain Language Summary English Acknowledged () 06/15/22    Plain  Language Summary English Acknowledged () 22    Provider Based Acknowledgement      Plain Language Summary English Acknowledged () 22    Miscellaneous Documents clinic Received 22 wound care f/u visit slip   Miscellaneous Documents clinic Received 22 wound care order Dr Farrell for [facility]   Consents Received () 22 Non-Healing Wound / Dr Farrell   Miscellaneous Documents clinic Received 22 Clinic Level of Care 2022   Plain Language Summary English Acknowledged () 22    Plain Language Summary English Acknowledged () 22    Plain Language Summary English Acknowledged () 22    Plain Language Summary English Acknowledged () 22    Plain Language Summary English Acknowledged () 22    Miscellaneous Documents clinic Received 22 wound care f/u visit   Miscellaneous Documents clinic Received 22 wound care and Hyperbaric referral sheet   Consents Received () 22 Non-Healing Wound Dr Farrell   Miscellaneous Documents clinic Received 10/06/22 wound care f/u visit   Miscellaneous Documents clinic Received 10/20/22 wound care f/u visit   Plain Language Summary English Acknowledged () 22    Miscellaneous Documents clinic Received 22 wound care f/u visit   Plain Language Summary English Acknowledged () 22 fin assist info   Miscellaneous Documents clinic Received 22 wound care f/u slip   Plain Language Summary English Acknowledged () 22 FIN ASSIST INFO   Miscellaneous Documents clinic Received 22 wound care f/u visit   Plain Language Summary English Acknowledged () 23    Clinic Authorization      Clinic Authorization Unable to Obtain () 23    Clinic Authorization Received () 23 Consent/ self   Miscellaneous Documents clinic Received 23 wound care f/u slip   Plain Language Summary  English Acknowledged () 23 fin assist info   Clinic Authorization Unable to Obtain () 23    Miscellaneous Documents clinic Received 23 wound care f/u visit   Clinic Authorization Signed () 23    Plain Language Summary English Acknowledged () 23    Miscellaneous Documents clinic Received 23 wound care f/u visit   Plain Language Summary English Acknowledged () 23 fin assist info   Miscellaneous Documents clinic Received 23 Clinic Level of Care 3/9/2023   Miscellaneous Documents clinic Received 23 mri questionaire   Plain Language Summary English Acknowledged () 23 fin assist info   Authorization or Referral Received 23 [facility] MEDICAID PROGRAM NOTICE OF MEDICAL CERTIFICATION   Authorization or Referral Received 23 LEVEL I PASRR SCREEN AND DETERMINATION (3 PAGES)   Notice of Privacy Pract Ackn      Provider Based Acknowledgement      HIM YENNI Authorization  23    HIM YENNI Authorization Received 23 [facility] Auth for ReleaseID [id number]   Plain Language Summary English Acknowledged () 05/15/23    Plain Language Summary English Acknowledged () 23    Patient Rights and Responsibilities Acknowledged 23    Coverage Attachment Received () 23    Plain Language Summary English Acknowledged () 24    Plain Language Summary English Acknowledged () 10/24/23    Plain Language Summary English Acknowledged () 10/27/23    Plain Language Summary English Acknowledged () 23 [facility] ASSIST INFO   [facility] YENNI Authorization Received 23 [facility] Auth for ReleaseID [id number]   Plain Language Summary English Acknowledged () 23    Plain Language Summary English Acknowledged () 23    Plain Language Summary English Acknowledged () 23    Plain Language Summary English Acknowledged () 12/15/23    Plain Language Summary  English Acknowledged () 23    Plain Language Summary English Acknowledged () 24    Plain Language Summary English Acknowledged () 24    HIM YENNI Authorization  02/15/24    Plain Language Summary English      Plain Language Summary English Acknowledged () 24 fin assist info   Insurance Documents Received (Deleted) 16 [place] 2016  Lorrie Tejeda   HIM Release of Information Output  (Deleted) 10/17/18 Continuity of Care Abstract   HIM Release of Information Output  (Deleted) 10/17/18 Continuity of Care Abstract   HIM Release of Information Output  (Deleted) 10/17/18 Continuity of Care Abstract   Patient Photo   Photo of Patient   HIM Release of Information Output  (Deleted) 21 Requested records   HIM Release of Information Output  (Deleted) 21 Requested records   HIM Release of Information Output  (Deleted) 22 Requested records   HIM Release of Information Output  (Deleted) 23 Requested records   HIM Release of Information Output  (Deleted) 23 Requested records   HIM Release of Information Output  (Deleted) 02/15/24 Requested records   Documents for the Encounter   Photographs      Photographs      Photographs      Photographs      Photographs      Photographs      Photographs      Photographs      Medicare Lifetime Reserve Form      Important Medicare Message Printed at Discharge      Advance Beneficiary Notice      Hospital Authorization Signed 24 Unable to Obtain due to pt being under isolation   Important Medicare Message [facility] Acute Signed 24    Photographs      Photographs      Photographs      Photographs      Photographs      Photographs      Photographs      Photographs      Photographs      Photographs      Photographs   Left leg   Photographs   LLE   Photographs   Left leg heel   Photographs   Right shoulder   Photographs   Right to   Photographs      Photographs      Photographs       DICOM Study      DICOM Series      DICOM Image      DICOM Series      DICOM Image      Monitoring Rhythm or Telemetry Received 03/11/24    Monitoring Rhythm or Telemetry Received 03/11/24    Monitoring Rhythm or Telemetry Received 03/12/24    Monitoring Rhythm or Telemetry Received 03/12/24    Monitoring Rhythm or Telemetry Received 03/13/24    Monitoring Rhythm or Telemetry Received 03/14/24    DICOM Study      DICOM Series      DICOM Image      DICOM Study  (Deleted)     DICOM Series  (Deleted)     DICOM Image  (Deleted)     DICOM Study      DICOM Series      DICOM Image        Admission Information    Current Information    Attending Provider Admitting Provider Admission Type Admission Status   Jm Rosa MD Jakher, Haroon, MD Emergency Confirmed Admission          Admission Date/Time Discharge Date Hospital Service Auth/Cert Status   03/05/24  0811  Hospital Medicine Incomplete          Hospital Area Unit Room/Bed    [facility] MEDICAL TELEMETRY STEPDOWN (MTSD) 8078/[id number] A            Discharge Disposition Discharge Destination   Another Health Care Institution Not Defined      Admission    Complaint        Hospital Account    Name Acct ID Class Status Primary Coverage   Saji Castañeda [id number] IP- Inpatient Open HUMANA MANAGED MEDICARE - HUMANA SNP O PPO SPECIAL NEEDS          Guarantor Account (for Hospital Account #[id number])    Name Relation to Pt Service Area Active? Acct Type   Saji Castañeda Self OHSSA Yes Personal/Family   Address Phone     [address] [place] [address] [phone](Z)            Coverage Information (for Hospital Account #[id number])    1. HUMANA MANAGED MEDICARE/HUMANA SNP HMO PPO SPECIAL NEEDS    F/O Payor/Plan Precert #   HUMANA MANAGED MEDICARE/HUMANA SNP HMO PPO SPECIAL NEEDS    Subscriber Subscriber #   Saji Castañeda [id number]   Address Phone   [address] [address]  [place] [address] [phone]   2. MEDICAID/[place]ICA[place] OF [place]  [facility]    F/O Payor/Plan Precert #   MEDICAID/MEDICAID OF [facility]    Subscriber Subscriber #   Saji Castañeda [id number]   Address Phone   [address] [address]  [place] [address] [phone]           Lynette Perkins MD   Resident  Hospital Medicine     Progress Notes      Attested     Creation Time: 3/17/2024  3:29 PM         Attestation signed by Mahendra Collins III, MD at 3/17/2024  8:43 PM     I have reviewed the Resident's progress note including assessment and plan.  I have personally interviewed and examined the patient at bedside.     74-year-old male with PMH DM2, HLD, HTN, chronic osteomyelitis of the left heel, left TMA, PAD, multidrug resistant organisms admitted for DKA and Acute on chronic osteomyelitis LLE.  DKA resolved with protocol.  ID consulted, ertapenem planned for 6 weeks with voriconazole.  Vascular surgery and Podiatry recommending AKA/BKA, pt declines.  Medically stable for discharge, denied by LTACH.  Peer to peer 3/15 unsuccessful.  CM/SW assisting with SNF placement.  Low-grade temp early a.m. 3/16 resolved without intervention.  No other signs or symptoms of infection.  Likely due to acute DVT.  3/16 Hb 6.9, no signs of active bleeding.  Transfused 1 unit.     Difficult to control diabetes.  Reportedly on 45 units Lantus q.a.m. and 6 units NovoLog t.i.d. a.c. as well as glipizide 20 mg b.i.d. Glipizide held.  Some issues with fast acting insulin stacking as well as inconsistent p.o. intake.  Titrating detemir and aspart.                 Active Hospital Problems     Diagnosis   POA    *Osteomyelitis of left lower extremity [M86.9]   Yes    Acute DVT RUE        Localized swelling of right upper extremity [R22.31]   Unknown    Proteus infection [A49.8]   Yes    History of Bedbug bite with infection [T14.8XXA, L08.9]   Yes    History of amputation of left high mid foot  [Z89.432]   Not Applicable    Chronic anticoagulation [Z79.01]   Not Applicable    Diabetic ketoacidosis without  coma associated with type 2 diabetes mellitus [E11.10]   Yes    Other hyperlipidemia [E78.49]   Yes    Severe sepsis [A41.9, R65.20]   Yes    Stage 3b chronic kidney disease [N18.32]   Yes    Paroxysmal atrial fibrillation [I48.0]   Yes       Chronic    Cellulitis of left lower leg [L03.116]   Yes    Peripheral arterial disease [I73.9]   Yes    Essential hypertension [I10]   Yes       Chronic       Resolved Hospital Problems     Diagnosis Date Resolved POA    Elevated troponin [R79.89] 03/07/2024 Yes       Labs personally reviewed:  WBC 8.79, Hb 6.9, sodium 140, potassium 4.2, creatinine 1.5     Mahendra Collins III, MD  Hospitalist  [facility] - Med Surg              [facility] - Telemetry [facility] Medicine  Progress Note     Patient Name: Saji Castañeda  MRN: [id number]  Patient Class: IP- Inpatient     Admission Date: 3/5/2024  Length of Stay: 12 days  Attending Physician: Mahendra Collins III*  Primary Care Provider: [facility] -           Subjective:      Principal Problem:Osteomyelitis of left lower extremity           HPI:  Saji Castañeda is a 74 y.o. male with a medical history of DM2, HLD, HTN, chronic osteomyelitis of L heel, L TMA, PAD, hx of ESBL klebsiella, acinetobacter bacteremia, presenting with hyperglycemia. He presented to the ED via EMS and his POCT glucose on arrival was >500. Serum blood glucose 667 with anion gap of 17 and elevated ketones. Serum potassium 3.4, corrected sodium 149. Urinalysis significant for RBCs, glucosuria, and moderate bacteria and yeast. CBC revealed leukocytosis, elevated platelets, and mild anemia. CMP shows Na 135, K 3.4, BUN 29, Cr 2.1, Glucose 677, Lactate 4.61. BNP and troponin elevated. GFR 32.2. Insulin drip, IV fluids, zosyn, vancomycin, and potassium given.      He is unable to provide much history, but states that he has not been taking his home medications for at least a week. He reports shortness of breath, fatigue, and weakness for the  last 6-7 days. He has chills and reports episodes of emesis. He denies abdominal pain, chest pain, palpitations, recent illness/infection, fevers, changes to bowel movements and urinary output. Patient lives with his brother and sister at home. He was last seen in the ED on 2/21 after a fall. He was hypoglycemic BGL 60 at that time which returned to normal after eating. He was also scheduled for a wound clinic appointment today 3/5 at [facility] with Dr. Wilson.         Overview/Hospital Course:  Wound care and cultures collected by Podiatry with recs for Vascular consult for amputation. Patient declined amputation. ID recommending IV Ertapenem for chronic osteomyelitis for 6 week duration (EED: 4/15/24). With high wound care needs and IV antibiotics plan patient needing long-term placement. He is being treated with topical permethrin for bedbugs. Wound care has been dressing the wounds. 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 LTAC authorization denied by [employer]. Waiting for SNF placement.        Interval History: REMINGTON DOMINGUEZ,         Objective:      Vital Signs (Most Recent):  Temp: 99.9 °F (37.7 °C) (03/17/24 0730)  Pulse: 65 (03/17/24 1506)  Resp: 20 (03/17/24 1100)  BP: (!) 113/50 (03/17/24 1100)  SpO2: 96 % (03/17/24 1100) Vital Signs (24h Range):  Temp:  [98.3 °F (36.8 °C)-99.9 °F (37.7 °C)] 99.9 °F (37.7 °C)  Pulse:  [51-68] 65  Resp:  [18-26] 20  SpO2:  [87 %-97 %] 96 %  BP: (113-139)/(50-65) 113/50      Weight: 120.2 kg (264 lb 15.9 oz)  Body mass index is 39.13 kg/m².     Intake/Output Summary (Last 24 hours) at 3/17/2024 1526  Last data filed at 3/17/2024 0656      Gross per 24 hour   Intake 411.17 ml   Output 750 ml   Net -338.83 ml         Physical Exam      Vitals and nursing note reviewed.   Constitutional:       Appearance: He is obese. Lethargic and slumped over.   HENT:      Head: Normocephalic and atraumatic.   Eyes:      Conjunctiva/sclera:  Conjunctivae normal.      Pupils: Pupils are equal, round, and reactive to light.   Cardiovascular:      Rate and Rhythm: Normal rate and regular rhythm.      Pulses: Normal pulses.   Pulmonary:      Effort: Pulmonary effort is normal. No respiratory distress.      Breath sounds: No wheezing or rales.   Abdominal:      Palpations: Abdomen is soft.      Tenderness: There is no abdominal tenderness. There is no guarding.   Musculoskeletal:      Comments: LLE wounds with dressing C/D/I  Swelling of the arms B/L  Skin:     General: Skin is warm and dry.   Neurological:      General: No focal deficit present.      Mental Status: He is alert and oriented to person, place, and time.   Psychiatric:         Mood and Affect: Mood agitated.       Significant Labs: All pertinent labs within the past 24 hours have been reviewed.     Significant Imaging: I have reviewed all pertinent imaging results/findings within the past 24 hours.     Assessment/Plan:      * Osteomyelitis of left lower extremity  Patient with Proteus and enterobacter on leg cultures collected by Podiatry with concern for acute on chronic osteo. Fungal culture grew fusarium     - IV ertapenem with end date 4/15/24   - Added voriconazole for fusarium coverage which grew on fungal culture  - midline catheters placed on admission for difficult IV access  -Still thinking about the amputation recommended by vascular surgery     Localized swelling of right upper extremity  Patient has swelling and pain in the right arm and some swelling in the left arm as well     -US b/l upper extremity for DVT showed clot, increased his eliquis from 5 mg to 10 mg and removed his central line.         History of Bedbug bite with infection  Patient started on Permethrin daily for 5 days starting 3/12        Chronic anticoagulation  - See DVT        History of amputation of left high mid foot   See osteo        Stage 3b chronic kidney disease  Creatine stable for now. BMP reviewed-  "noted Estimated Creatinine Clearance: 54.5 mL/min (A) (based on SCr of 1.5 mg/dL (H)). according to latest data. Based on current GFR, CKD stage is stage 3 - GFR 30-59.  Monitor UOP and serial BMP and adjust therapy as needed. Renally dose meds. Avoid nephrotoxic medications and procedures.     Severe sepsis  This patient does have evidence of infective focus  My overall impression is sepsis.  Source: Skin and Soft Tissue (location left leg)  Antibiotics given-   Antibiotics (72h ago, onward)        Start     Stop Route Frequency Ordered     03/08/24 1230   ertapenem (INVANZ) 1 g in sodium chloride 0.9 % 100 mL IVPB (MB+)         -- IV Every 24 hours (non-standard times) 03/08/24 1115             Latest lactate reviewed-  No results for input(s): "LACTATE", "POCLAC" in the last 72 hours.     Organ dysfunction indicated by Acute kidney injury     Fluid challenge Actual Body weight- Patient will receive 30ml/kg actual body weight to calculate fluid bolus for treatment of septic shock.      Post- resuscitation assessment No - Post resuscitation assessment not needed         Will Not start Pressors-      - See osteo        Other hyperlipidemia  Continue home atorvastatin        Diabetic ketoacidosis without coma associated with type 2 diabetes mellitus  Patient's FSGs are uncontrolled due to hyperglycemia on current medication regimen.  Last A1c reviewed-         Lab Results   Component Value Date     HGBA1C >14.0 (H) 03/05/2024      Most recent fingerstick glucose reviewed-          Recent Labs   Lab 03/16/24  1825 03/16/24  1953 03/17/24  0836 03/17/24  1309   POCTGLUCOSE 92 113* 181* 116*         Current correctional scale   Insulin drip  Maintain anti-hyperglycemic dose as follows-   Antihyperglycemics (From admission, onward)        Start     Stop Route Frequency Ordered     03/16/24 0715   insulin aspart U-100 pen 12 Units         -- SubQ 3 times daily with meals 03/15/24 1902     03/11/24 1521   insulin aspart " U-100 pen 0-5 Units         -- SubQ Before meals & nightly PRN 03/11/24 1520     03/05/24 1800   insulin detemir U-100 (Levemir) pen 30 Units         -- SubQ Nightly 03/05/24 1713             Hold Oral hypoglycemics while patient is in the hospital.  Will keep patient on DKA pathway with insulin drip and fluid with protocol in place for switching to subcutaneous insulin as anion gap closes.        DKA  HHS  - Resolved  - Continue insulin regimen and BG checks TIDWM and QHS        Paroxysmal atrial fibrillation  Patient with Persistent (7 days or more) atrial fibrillation which is uncontrolled currently with    . Patient is currently in sinus rhythm.[id number] Score: 4. . Anticoagulation indicated. Anticoagulation done with lovenox as we could not get IV access for heparin  .     -apixaban 5 mg after finishing 12 doses of 10 mg eliquis for DVT     Chronic deep vein thrombosis (DVT) of right upper extremity  - Continue home AC        Cellulitis of left lower leg  See osteo        Peripheral arterial disease  Resume plavix        Essential hypertension  Chronic, uncontrolled. Latest blood pressure and vitals reviewed-      Temp:  [98.3 °F (36.8 °C)-99.9 °F (37.7 °C)]   Pulse:  [51-68]   Resp:  [18-26]   BP: (113-139)/(50-65)   SpO2:  [87 %-97 %] .   Home meds for hypertension were reviewed and noted below.   Hypertension Medications                    furosemide (LASIX) 40 MG tablet Take 20 mg by mouth.     hydrALAZINE (APRESOLINE) 100 MG tablet Take 1 tablet (100 mg total) by mouth every 8 (eight) hours.     isosorbide dinitrate (ISORDIL) 10 MG tablet Take 1 tablet (10 mg total) by mouth 3 (three) times daily.     NIFEdipine (PROCARDIA-XL) 60 MG (OSM) 24 hr tablet Take 1 tablet (60 mg total) by mouth once daily.                While in the hospital, will manage blood pressure as follows; Adjust home antihypertensive regimen as follows- will keep hydralazine but hold valsartan and lasix     Will utilize p.r.n. blood  pressure medication only if patient's blood pressure greater than 180/110 and he develops symptoms such as worsening chest pain or shortness of breath.        VTE Risk Mitigation (From admission, onward)              Ordered       apixaban tablet 5 mg  2 times daily        See Hyperspace for full Linked Orders Report.    03/15/24 0954       apixaban tablet 10 mg  2 times daily        See Hyperspace for full Linked Orders Report.    03/15/24 0954                          Discharge Planning   OXANA: 3/19/2024     Code Status: Full Code   Is the patient medically ready for discharge?: Yes    Reason for patient still in hospital (select all that apply): pending disposition  Discharge Plan A: Skilled Nursing Facility   Discharge Delays: None known at this time                    Lynette Perkins MD  Department of Hospital Medicine   [facility] - Telemetry Stepdown           Cosigned by: Mahendra Collins III, MD at 3/17/2024  8:43 PM     Revision History        Shelly Evans PTA   Physical Therapy Assistant  Physical Therapy     PT/OT/SLP Progress      Signed     Creation Time: 3/15/2024  4:39 PM       Physical Therapy  Continue Current Plan of Care      Patient Name:  Saji Castañeda   MRN:  [id number]  Admitting Diagnosis:  Osteomyelitis of left lower extremity   Recent Surgery: * No surgery found *    Admit Date: 3/5/2024  Length of Stay: 10 days     Patient not seen on this date, however per chart review pt continues to benefit from acute PT services. PT to follow up as POC allows. Please continue progressive mobility as appropriate.     Discharge Disposition Recommendation: Moderate intensity therapy     RODOLFO Sylvester  3/15/2024                        Gwen Villalobos OTA   Occupational Therapy Assistant  Occupational Therapy     PT/OT/SLP Progress      Signed     Creation Time: 3/15/2024  4:14 PM       Occupational Therapy   Treatment     Name: Saji Castañeda  MRN: [id number]  Admitting Diagnosis:  Osteomyelitis of left lower  extremity        Recommendations:      Discharge Recommendations: Moderate Intensity Therapy  Discharge Equipment Recommendations:  lift device, hospital bed  Barriers to discharge:  Other (Comment) (increased skilled assistance required)     Assessment:      Saji Castañeda is a 74 y.o. male with a medical diagnosis of Osteomyelitis of left lower extremity.  He presents with the following performance deficits affecting function are weakness, impaired endurance, impaired sensation, gait instability, impaired functional mobility, impaired self care skills, impaired balance, decreased lower extremity function, decreased upper extremity function, decreased safety awareness, decreased ROM, impaired skin, pain.   Pt was pleasant and willing to participate this session although limited to increased pain.     Rehab Prognosis:  Good; patient would benefit from acute skilled OT services to address these deficits and reach maximum level of function.        Plan:      Patient to be seen 2 x/week to address the above listed problems via self-care/home management, therapeutic activities, therapeutic exercises, neuromuscular re-education  Plan of Care Expires: 04/06/24  Plan of Care Reviewed with: patient     Subjective      Chief Complaint: L LE pain  Patient/Family Comments/goals: Pt agreeable to tx session  Pain/Comfort:  Pain Rating 1: 8/10  Location - Side 1: Left  Location - Orientation 1: generalized  Location 1: leg  Pain Addressed 1: Reposition, Distraction, Cessation of Activity, Nurse notified     Objective:      Communicated with: nurse (Marion) prior to session.  Patient found supine with telemetry, pressure relief boots upon OT entry to room.  A client care conference was completed by the OTR and the TAYLOR prior to treatment by the TAYLOR to discuss the patient's POC and current status.   General Precautions: Standard, fall, contact    Orthopedic Precautions:LLE non weight bearing, RLE weight bearing as  tolerated  Braces:  (Darco R LE)  Respiratory Status: Room air     Occupational Performance:      Bed Mobility:    Patient completed Rolling/Turning to Right with maximal assistance, with side rail, and drawsheet  Patient completed Scooting/Bridging with maximal assistance, with side rail, and drawsheet       Functional Mobility/Transfers:  Not assessed this date        Activities of Daily Living:  Grooming: setup assistance to wash face with washcloth          AMPAC 6 Click ADL: 12     Treatment & Education:  Pt performed bed mobility, functional mobility/transfers, and ADLs as documented above.   Pt performed supine 2# dowel exercises following therapist's verbal instructions and visual demonstration to improve B UE AROM, optimize joint mobility, and strength:  Pt performed the following dowel exercises   B shoulder flexion/ext  Bench press  B  elbow flexion     Pt educated and instructed on the following:  OT POC  Role of TAYLOR  Use of call bell for all assistance  NWB Precaution L LE  Addressed questions and /or concerns within TAYLOR scope of practice  Pt verbalized understanding   Patient left supineHOB elevated with all lines intact, call button in reach, bed alarm on, and nurse (Marion) notified. Pt appears NAD and all belongings placed within reach.     GOALS:   Multidisciplinary Problems         Occupational Therapy Goals                  Problem: Occupational Therapy     Goal Priority Disciplines Outcome Interventions   Occupational Therapy Goal      OT, PT/OT Ongoing, Progressing     Description: Goals to be met by: 4/6/24      Patient will increase functional independence with ADLs by performing:     UE Dressing with Contact Guard Assistance.  LE Dressing with Maximum Assistance.  Grooming while EOB with Stand-by Assistance.  Toileting from bedside commode with Maximum Assistance for hygiene and clothing management.   Sitting at edge of bed x5 minutes with Stand-by Assistance.  Rolling to Bilateral with  Minimal Assistance.   Supine to sit with Minimal Assistance.  Stand pivot transfers with Minimal Assistance.  Toilet transfer to bedside commode with Maximum Assistance.  BUE strengthening exercise program 2x day with theraband/HEP worksheet                                Time Tracking:      OT Date of Treatment: 03/15/24  OT Start Time: 1524  OT Stop Time: 1535  OT Total Time (min): 11 min     Billable Minutes:Therapeutic Activity 11     OT/KATHLEEN: KATHLEEN     Number of KATHLEEN visits since last OT visit: 1     3/15/2024      Cosigned by: Lisbeth Madison, CORRY at 3/15/2024  5:48 PM     Revision History           Kimmy Payne PA-C   Physician Assistant  Infectious Diseases     Progress Notes      Cosign Needed     Creation Time: 3/15/2024  2:53 PM       [facility] - Telemetry Stepdown  Infectious Disease  Progress Note     Patient Name: Saji Castañeda  MRN: [id number]  Admission Date: 3/5/2024  Length of Stay: 10 days  Attending Physician: Mahendra Collins III*  Primary Care Provider: [facility] -     Isolation Status: Contact  Assessment/Plan:      ID  Cellulitis of left lower leg  73 y/o male with A-fib, uncontrolled DMII, HLD, HTN, chronic heel ulcerations c/b chronic osteomyelitis, chronic leg wounds admitted for DKA. ID consulted for infected chronic wounds/ulcerations and abx guidance as with hx of MDROs.      Podiatry obtained left heel wound cultures (probes to bone). Cx + Proteus mirabilis, E. Cloaecae and now Fusarium and C. Albicans. Patient has been on IV Ertapenem with plans for six weeks of treatment at LTAC. ID re-consulted due to fungal cultures returning positive.        Recommendations  Continue IV Ertapenem x 6 weeks for acute on chronic osteomyelitis   Start IV Voriconazole 6 mg/kg IV  x 2 doses followed by Voriconazole 200 mg PO BID   Would continue having discussions regarding proximal amputation as this will achieve source control and long term antimicrobials will not be indicated. If  patient refuses amputation, see OPAT plan below  Per primary team, patient being discharged to LTAC. Please consult ID at LTAC to follow patient and patient's labs.   Discussed with ID staff. ID will sign off.                 Outpatient Antibiotic Therapy Plan:        1) Infection: osteomyelitis      2) Discharge Antibiotics:     Intravenous antibiotics:  Ertapenem 1 g IV q 24 hours     Oral antifungal:  Voriconazole 200 mg PO BID     3) Therapy Duration:  6 weeks of IV antibiotics and 3 months of oral antifungal tx     Estimated end date of IV antibiotics: 04/15/24  Estimated end date of PO antifungal: 06/14/24     4) Outpatient Weekly Labs:     Order the following labs to be drawn on Mondays:   CBC  CMP   CRP     5) Fax Lab Results to Infectious Diseases Provider: Kimmy Payne     [facility] Fax Number: [phone]     6) Outpatient Infectious Diseases Follow-up     Follow-up appointment will be arranged by the ID clinic and will be found in the patient's appointments tab.     Prior to discharge, please ensure the patient's follow-up has been scheduled.    If there is still no follow-up scheduled prior to discharge, please send an EPIC message to Jennifer Sapp in Infectious Diseases.                       Thank you for the consult. ID will sign off. Please secure chat for any questions.  Kimmy Payne PA-C        Subjective:      Principal Problem:Osteomyelitis of left lower extremity     HPI: 75 y/o male with a.fib, DMII uncontrolled, HLD, HTN, chronic heel ulcerations c/b chronic osteomyelitis, hx of  Acinetobacter and ESBL klebsiella admitted for DKA. ID consulted for chronic wounds/ulcerations and abx guidance as with hx of MDROs. Remained afebrile, no leukocytosis. Pt was given dose of meropenem. Blood cultures remain NGTD.   Interval History:   ID consulted as wound cultures are positive for fusarium and candida. Repeat . Started Voriconazole. Patient tentatively planning to be  discharged to LTAC.     Review of Systems   Constitutional:  Negative for chills, diaphoresis, fatigue and fever.   HENT:  Negative for congestion, rhinorrhea and sore throat.    Respiratory:  Negative for cough and shortness of breath.    Cardiovascular:  Negative for chest pain and leg swelling.   Gastrointestinal:  Negative for abdominal pain, constipation, nausea and vomiting.   Genitourinary:  Negative for dysuria, frequency and urgency.   Musculoskeletal:  Positive for gait problem and myalgias. Negative for arthralgias, back pain and neck pain.   Skin:  Positive for color change and wound.   Neurological:  Negative for weakness, numbness and headaches.      Objective:      Vital Signs (Most Recent):  Temp: 97.6 °F (36.4 °C) (03/15/24 1208)  Pulse: 60 (03/15/24 1208)  Resp: 18 (03/15/24 1208)  BP: (!) 135/55 (03/15/24 1208)  SpO2: 95 % (03/15/24 1208) Vital Signs (24h Range):  Temp:  [97.6 °F (36.4 °C)-99.5 °F (37.5 °C)] 97.6 °F (36.4 °C)  Pulse:  [57-60] 60  Resp:  [16-20] 18  SpO2:  [92 %-95 %] 95 %  BP: (124-156)/(54-65) 135/55      Weight: 120.2 kg (264 lb 15.9 oz)  Body mass index is 39.13 kg/m².     Estimated Creatinine Clearance: 58.4 mL/min (based on SCr of 1.4 mg/dL).     Physical Exam  Constitutional:       General: He is not in acute distress.     Appearance: Normal appearance. He is well-developed. He is not ill-appearing or diaphoretic.   HENT:      Head: Normocephalic and atraumatic.      Right Ear: External ear normal.      Left Ear: External ear normal.      Nose: Nose normal.   Eyes:      General: No scleral icterus.        Right eye: No discharge.         Left eye: No discharge.      Conjunctiva/sclera: Conjunctivae normal.   Pulmonary:      Effort: Pulmonary effort is normal. No respiratory distress.      Breath sounds: No stridor.   Skin:     General: Skin is dry.      Coloration: Skin is not jaundiced or pale.      Findings: No erythema.      Comments: See wound photos in media tab  "  Neurological:      General: No focal deficit present.      Mental Status: He is alert and oriented to person, place, and time. Mental status is at baseline.   Psychiatric:         Mood and Affect: Mood normal.         Behavior: Behavior normal.         Thought Content: Thought content normal.         Judgment: Judgment normal.               Significant Labs: All pertinent labs within the past 24 hours have been reviewed.     Significant Imaging: I have reviewed all pertinent imaging results/findings within the past 24 hours.            Gagan Franklin MD   Resident  Podiatry     Plan of Care      Signed     Creation Time: 3/14/2024  3:13 PM       [facility] - Telemetry Stepdown  Podiatry  Care Update     Patient was briefly visited bedside by Podiatry before being transported to ultrasound.  As per yesterday's discussion, reiterated to patient that podiatry recommends a more proximal amputation as there is limited treatment from Podiatry perspective otherwise. As with yesterday's discussion entailing he would come to a decision in the next 24 hours, he states today "I'm still thinking about it."           Assessment/Plan:      ID  * Osteomyelitis of left lower extremity  Saji Castañeda is a 74 y.o. male who is status post left lower extremity toe part amputation.  Now presenting for acute infection, with acute on chronic osteomyelitis of the left calcaneus. Arterial ultrasound shows no hemodynamically significant stenosis. Left MRI shows osteomyelitis. At this time there is no more proximal amputation that Podiatry can offer.      - Vascular surgery recommended BKA/AKA. Patient not amenable. Consider re-consult if patient is amenable.   - Abx per ID  - Continue dressing LLE with Hydrafera blue, Kerlex, and ACE wrap  - NWB, patient to use wheelchair  - All other care per primary        Podiatry will sign off, recommending continuing wound care. Thank you for involving us in the care of this patient. Please reach out " for any new, worsening pedal symptoms.      Future discharge recs:  Patient to follow up in Podiatry Clinic outpatient, Podiatry will arrange.  SNF or HH to apply bandaging as described above  Abx plan per ID  Patient to transfer via wheelchair  Patient to keep dressings clean dry and intact  Patient explained the importance of daily foot checks        Gagan Franklin DPM PGY-1  Podiatric Medicine & Surgery  [facility]  Secure Chat Preferred  Pager: [phone]         Cosigned by: Narciso Hernandez DPM at 3/16/2024  3:42 PM     Revision History        Va Mathew, PT   Physical Therapist  Physical Therapy     PT/OT/SLP Progress      Signed     Creation Time: 3/13/2024  8:30 PM       Physical Therapy Co-Treatment     Patient Name:  Saji Castañeda   MRN:  [id number]     Recommendations:      Discharge Recommendations: Moderate Intensity Therapy  Discharge Equipment Recommendations: lift device, hospital bed (Pressure relief mattress)  Barriers to discharge: Decreased caregiver support and Increased skilled assistance required     Assessment:   Co-evaluation/treatment performed due to patient's multiple deficits requiring two skilled therapists to appropriately and safely assess patient's strength, endurance, functional mobility, and ADL performance while facilitating functional tasks in addition to accommodating for patient's activity tolerance and medical acuity.     Saji Castañeda is a 74 y.o. male admitted with a medical diagnosis of Osteomyelitis of left lower extremity.  He presents with the following impairments/functional limitations: weakness, impaired endurance, impaired sensation, impaired functional mobility, impaired self care skills, impaired balance, decreased lower extremity function, decreased upper extremity function, pain, decreased ROM, impaired coordination, impaired skin. Patient demonstrating good motivation to participate in session, with fair response to completed activities and  "provided education. Patient's pain continues to pose significant limitation to bed mobility at this time. Patient requiring prolonged rest breaks secondary to his overall activity endurance, influenced by his aforementioned pain; muscular strength; & endurance capacity. This is evidenced by patient's demonstrated poor tolerance to bed mobility required to achieve EOB sitting + inability to maintain upright positioning once seated EOB. Furthermore, PT/OT required to pivot session emphasis to supporting exercises targeting for general mobility. Patient continues to demonstrate the need for moderate intensity therapy on a daily basis exhibited by decreased independence with self-care and functional mobility      Rehab Prognosis: Fair; patient would benefit from acute skilled PT services to address these deficits and reach maximum level of function.    Recent Surgery: * No surgery found *       Plan:      During this hospitalization, patient to be seen 4 x/week to address the identified rehab impairments via gait training, therapeutic activities, therapeutic exercises, neuromuscular re-education and progress toward the following goals:     Plan of Care Expires:  04/06/24     Subjective      Chief Complaint: Pain & SOB  Patient/Family Comments/goals: Pt agreeable to session  Pain/Comfort:  Pain Rating 1: 10/10  Pain Addressed 1: Reposition, Distraction, Nurse notified  Pain Rating Post-Intervention 1: Not rated        Objective:      Communicated with RN prior to session.  Patient found HOB elevated, with significant trunk lean to R. Patient with telemetry, pressure relief boots, PRAFO upon PT entry to room.      General Precautions: Standard, fall, contact, diabetic   Orthopedic Precautions:LLE non weight bearing, RLE weight bearing as tolerated   Braces:  (Darco (RLE))   Body mass index is 39.13 kg/m².  Oxygen Device: Room Air  Vitals: BP (!) 158/72   Pulse 89   Temp 98.3 °F (36.8 °C) (Oral)   Resp 18   Ht 5' 9" " (1.753 m)   Wt 120.2 kg (265 lb)   SpO2 99%   BMI 39.13 kg/m²      Functional Mobility:  Bed Mobility: *Pain with all tasks   Rolling Left: TotalA with HOB Elevated  Rolling Right: TotalA with HOB Elevated  EOB Scooting:   Anterior: TotalAx2  Boosting: TotalAx2 with HOB Flat  Supine>Sit: x1, TotalAx2 with HOB Elevated. Pt unable to achieve full upright sit position  Sit>Supine: x1, TotalAx2 with HOB Elevated     Balance:   Static Sitting: TotalAx2. Pt w/ significant R lean onto elevated HOB; Unable to sustain midline positioning that PT/OT facilitated     AM-PAC 6 CLICK MOBILITY  Turning over in bed (including adjusting bedclothes, sheets and blankets)?: 2  Sitting down on and standing up from a chair with arms (e.g., wheelchair, bedside commode, etc.): 1  Moving from lying on back to sitting on the side of the bed?: 2  Moving to and from a bed to a chair (including a wheelchair)?: 1  Need to walk in hospital room?: 1  Climbing 3-5 steps with a railing?: 1  Basic Mobility Total Score: 8       Treatment & Education:  Patient participated in and completed the following supine exercises:  Hip Abd/Add: x5 L/R. Cues for technique, including full ROM  Heel slides: x8 L/R. Cues & facilitation for technique, including full ROM  SLR: x8 L/R. Cues & facilitation for technique, including full ROM; maintaining QS with activity  Cervical lat flex: x2 L/R. Significantly reduced AROM B directions, L>R. Patient only able to achieve neutral position to L-side  Significantly reduced ROM observed throughout all exercises     PT/OT facilitating midline positioning of head; trunk; and BUE/BLEs, utilizing wedges & pillows     Patient Education Provided on:  The role of physical therapy and how the patient can benefit from skilled services  The negative effects of prolonged bed rest/sedentary behavior, along with the importance of positioning + change of positioning, & patient participation with PT  The importance of contacting RN, via  call light, for mobility throughout the day  Pt white board updated with current therapists name and BUE/BLE prescribed supine exercise     Patient Verbalized understanding of all topics touched on this date. All patient questions answered within the PT scope of practice     Patient left HOB elevated with all lines intact, call button in reach, and RN notified..     GOALS:   Multidisciplinary Problems         Physical Therapy Goals                  Problem: Physical Therapy     Goal Priority Disciplines Outcome Goal Variances Interventions   Physical Therapy Goal      PT, PT/OT Ongoing, Progressing       Description: Goals to be met by: 24      Patient will increase functional independence with mobility by performin. Supine to sit with Moderate Assistance  2. Sit to supine with Moderate Assistance  3. Rolling to Left and Right with Moderate Assistance.  4. Sit to stand transfer with Maximum Assistance with LRAD  5. Bed to chair transfer with Moderate Assistance using LRAD  6. Wheelchair propulsion x100 feet with Stand-by Assistance using UE/LE  7. Sitting at edge of bed 10 minutes with Modified [place]  8. Lower extremity exercise program x30 reps per handout, with assistance as needed                                Time Tracking:      PT Received On: 24  PT Start Time: 1518     PT Stop Time: 1601  PT Total Time (min): 43 min      Billable Minutes: Therapeutic Activity 33 and Therapeutic Exercise 10     Treatment Type: Treatment  PT/PTA: PT     Number of PTA visits since last PT visit: 0      2024              Lisbeth Madison OT   Occupational Therapist  Occupational Therapy     PT/OT/SLP Progress      Signed     Creation Time: 3/13/2024  5:08 PM       Occupational Therapy   Co - Treatment with PT     Name: Saji Castañeda  MRN: [id number]  Admitting Diagnosis:  Osteomyelitis of left lower extremity        Recommendations:      Discharge Recommendations: Moderate Intensity  "Therapy  Discharge Equipment Recommendations:  hospital bed, lift device  Barriers to discharge:   requires increased skilled A     Assessment:      Saji Castañeda is a 74 y.o. male with a medical diagnosis of Osteomyelitis of left lower extremity.  He presents with  performance deficits affecting function are weakness, impaired endurance, impaired sensation, impaired self care skills, impaired balance, pain, edema, impaired skin, decreased lower extremity function, decreased ROM, decreased upper extremity function, impaired fine motor.      Pt engaged fairly in therapy this date, limited by pain from wound on buttocks and SOB upon exertion during TherEx needing rest breaks. Pt attempted to sit EOB with total A x2 with pt attempting to move RLE but needing significant assistance. During initial transfer to EOB sitting, pt asked for seated rest break in long sitting with legs not off bed 2* SOB.  Pt's O2 measured >89%, and session resumed when pt ready after ~3min.  Attempt to assist pt to EOB progressed though pt in significant pain during 2nd attempt, asked for another ~10min rest break with legs in long sitting off EOB and shoulders leaning to R against raised HOB. After ~3min with O2 measuring ~92%, pt returned to sitting HOB raised and completed TherEx for BUE/BLE. Significant time required this date for bed mobility, preparing pt for mobility, and TherEx.  Pt requires additional assistance for bed mobility. Pt on track for post acute care at level of moderate intensity.      Rehab Prognosis:  Fair; patient would benefit from acute skilled OT services to address these deficits and reach maximum level of function.        Plan:      Patient to be seen 4 x/week to address the above listed problems via self-care/home management, therapeutic activities, therapeutic exercises, neuromuscular re-education  Plan of Care Expires: 04/06/24  Plan of Care Reviewed with: patient     Subjective      Chief Complaint: "My back " "itches"   Patient/Family Comments/goals: Get better, return to PLOF  Pain/Comfort:  Pain Rating 1: 10/10  Location - Side 1: Left  Location - Orientation 1: lower  Location 1: gluteal (wound)  Pain Addressed 1: Reposition, Distraction, Nurse notified  Pain Rating Post-Intervention 1:  (not quantified)     Objective:      Communicated with: RN prior to session.  Patient found HOB elevated with telemetry, PRAFO upon OT entry to room.     General Precautions: Standard, fall, contact, diabetic    Orthopedic Precautions:LLE non weight bearing, RLE weight bearing as tolerated (RLE Darco shoe)  Braces:  (Darco)  Respiratory Status: Room air     Occupational Performance:      Bed Mobility:    Patient completed Rolling/Turning to Left with  total assistance  Patient completed Rolling/Turning to Right with total assistance  Patient completed Scooting/Bridging  To EOB with total assistance and 2 persons while seated and R lean against raised HOB  To HOB while supine with total a x2  Patient completed Supine to Sit with total assistance and 2 persons  Patient completed Sit to Supine with total assistance and *2 persons   Pt demo'd poor EOB balance, needing to lean to the R against raised HOB     Functional Mobility/Transfers:  - Not attempted due to safety/pain     Activities of Daily Living:  Grooming: maximal assistance rubbing lotion on back and neck per pt request while PT measured blood O2, CGA to wash head/neck with washcloth   Upper Body Dressing: moderate assistance doffing soiled gown, donning fresh gown   Lower Body Dressing: total assistance doffing/donning PRAFO and DARCO     Therapeutic Exercises:   - BUE: 10 arm raises  - BUE: 10 chest presses  TherEx written on board     PT completed BLE TherEx, please refer to note.      [facility] 6 Click ADL: 12     Treatment & Education:  Pt educated on role of OT, POC, and goals for therapy.    POC was dicussed with patient/caregiver, who was included in its development and is in " agreement with the identified goals and treatment plan.   Patient and family aware of patient's deficits and therapy progression.   Time provided for therapeutic counseling and discussion of health disposition.   Educated on importance of EOB/OOB mobility, maintaining routine, sitting up in chair, and maximizing independence with ADLs during admission   Pt completed ADLs and bed mobility for treatment session as noted above   Pt/caregiver verbalized understanding and expressed no further concerns/questions.  Updated communication board with level of assist required         Patient left HOB elevated with all lines intact, call button in reach, and RN notified     GOALS:   Multidisciplinary Problems         Occupational Therapy Goals                  Problem: Occupational Therapy     Goal Priority Disciplines Outcome Interventions   Occupational Therapy Goal      OT, PT/OT Ongoing, Progressing     Description: Goals to be met by: 4/6/24      Patient will increase functional independence with ADLs by performing:     UE Dressing with Contact Guard Assistance.  LE Dressing with Maximum Assistance.  Grooming while EOB with Stand-by Assistance.  Toileting from bedside commode with Maximum Assistance for hygiene and clothing management.   Sitting at edge of bed x5 minutes with Stand-by Assistance.  Rolling to Bilateral with Minimal Assistance.   Supine to sit with Minimal Assistance.  Stand pivot transfers with Minimal Assistance.  Toilet transfer to bedside commode with Maximum Assistance.  BUE strengthening exercise program 2x day with theraband/HEP worksheet                                Time Tracking:      OT Date of Treatment: 03/13/24  OT Start Time: 1518  OT Stop Time: 1601  OT Total Time (min): 43 min     Billable Minutes:Self Care/Home Management 30  Therapeutic Exercise 13     OT/KATHLEEN: OT        3/13/2024         [facility] Run Time: 1251       Run Date: 03/18/24         Saji Castañeda  "y.o.   [id number]    Account #: [id number]   MRN: [id number]    : 48     Height: 175.3 cm (69")     Weight: 120.2 kg (265 lb)     BSA: 2.42 m2   Allergies: No Known Allergies   Diagnosis: Lactic acidosis, DKA (diabetic ketoacidosis), Chest pain, Sepsis, Hyperosmolar hyperglycemic state (HHS)   Attending: Jm Rosa MD   Admitting: Jm Rosa MD     Scheduled Meds Sorted by Name  for Saji Castañeda as of 24 1251  Legend:       Medications 24    apixaban tablet 10 mg  Dose: 10 mg  Freq: 2 times daily Route: Oral  Indications of Use: deep venous thrombosis  Start: 03/15/24 2100 End: 24-D/C'd         Followed by  apixaban tablet 5 mg  Dose: 5 mg  Freq: 2 times daily Route: Oral  Indications of Use: deep venous thrombosis  Start: 24        atorvastatin tablet 40 mg  Dose: 40 mg  Freq: Daily Route: Oral  Start: 24 1545    20      900         clopidogreL tablet 75 mg  Dose: 75 mg  Freq: Daily Route: Oral  Start: 24         ertapenem (INVANZ) 1 g in sodium chloride 0.9 % 100 mL IVPB (MB+)  Dose: 1 g  Freq: Every 24 hours (non-standard times) Route: IV  Indications of Use: Bone/Joint  Start: 24 1230    1230      1230      1230      1230      1230      1230         gabapentin capsule 300 mg  Dose: 300 mg  Freq: 2 times daily Route: Oral  Start: 24         insulin aspart U-100 pen 10 Units  Dose: 10 Units  Freq: 3 times daily with meals Route: SubQ  Start: 24 0715   Admin Instructions:   "HIGH ALERT " "MEDICATION" - Administer with meals or TF/TPN.    0816     1237     1645      0715     1130     1645      0715     1130     1645      0715     1130     1645           insulin detemir U-100 (Levemir) pen 15 Units  Dose: 15 Units  Freq: Nightly Route: SubQ  Start: 03/17/24 2100 End: 03/18/24 2059   Admin Instructions:   If Blood Glucose is less than 100 mg/dL at BEDTIME, give 16 grams (four glucose tablets) X 1 dose for patients who can take PO. Recheck BG in 30 minutes and call MD for insulin dose adjustments prior to administering insulin detemir (Levemir).  "HIGH ALERT MEDICATION"    2059-D/C'd            insulin detemir U-100 (Levemir) pen 30 Units  Dose: 30 Units  Freq: Daily Route: SubQ  Start: 03/18/24 0900   Admin Instructions:   If Blood Glucose is less than 100 mg/dL at BEDTIME, give 16 grams (four glucose tablets) X 1 dose for patients who can take PO. Recheck BG in 30 minutes and call MD for insulin dose adjustments prior to administering insulin detemir (Levemir).  "HIGH ALERT MEDICATION"    0921 0900 0900 0900           lactated ringers bolus 1,000 mL  Dose: 1,000 mL  Freq: Once Route: IV  Start: 03/18/24 1315   Admin Instructions:   Not physically compatible with blood products    1236     1336              NIFEdipine 24 hr tablet 90 mg  Dose: 90 mg  Freq: Daily Route: Oral  Start: 03/07/24 1115   Admin Instructions:   Do not chew, crush, or break.    0920 0900 0900 0900      0900          pantoprazole EC tablet 40 mg  Dose: 40 mg  Freq: Daily Route: Oral  Start: 03/05/24 1545   Admin Instructions:   DO NOT CRUSH OR CHEW; SWALLOW WHOLE.    0920 0900 0900 0900 0900      0900         polyethylene glycol packet 17 g  Dose: 17 g  Freq: Daily Route: Oral  Start: 03/18/24 0900 0921 0900 0900      0900           senna tablet 8.6 mg  Dose: 8.6 mg  Freq: Daily Route: Oral  Start: 03/18/24 0900 0921 0900 0900      0900            sodium " chloride 0.9% flush 10 mL  Dose: 10 mL  Freq: Every 6 hours Route: IV  Start: 03/06/24 0100   Admin Instructions:   to maintain patency    0029     0610     1240     1800      0000     0600     1200     1800      0000     0600     1200     1800      0000     0600     1200     1800           And  sodium chloride 0.9% flush 10 mL  Dose: 10 mL  Freq: As needed (PRN) Route: IV  PRN Reason: Line Care  Start: 03/06/24 0052   Admin Instructions:   to maintain patency             tamsulosin 24 hr capsule 0.4 mg  Dose: 0.4 mg  Freq: Daily Route: Oral  Start: 03/05/24 1545   Admin Instructions:   DO NOT CRUSH OR CHEW; SWALLOW WHOLE.    0920      0900      0900      0900      0900      0900          tuberculin injection 5 Units  Dose: 5 Units  Freq: Once Route: IDrm  Start: 03/18/24 1215    1215              voriconazole tablet 200 mg  Dose: 200 mg  Freq: 2 times daily Route: Oral  Start: 03/15/24 1045    0800     0921     2100      0900     2100      0900     2100      0900     2100           Legend:       [id number]/18/2403/19/2403/20/2403/21/2403/22/2403/23/24032403/24/24        Continuous Meds Sorted by Name  for Saji Castañeda as of 03/18/24 1251  Legend:       Medications 03/18/24 03/19/24 03/20/24 03/21/24 03/22/24 03/23/24 03/24/24               PRN Meds Sorted by Name  for Saji Castañeda as of 03/18/24 1251  Legend:       Medications 03/18/24 03/19/24 03/20/24 03/21/24 03/22/24 03/23/24 03/24/24   acetaminophen tablet 650 mg  Dose: 650 mg  Freq: Every 4 hours PRN Route: Oral  PRN Reason: mild pain 1-3/10 pain scale  Start: 03/06/24 0137   Admin Instructions:   Maximum dose of acetaminophen is 3000 mg from all sources in 24 hours, 2000 mg in hepatic failure patients             aluminum-magnesium hydroxide-simethicone 200-200-20 mg/5 mL suspension 30 mL  Dose: 30 mL  Freq: 4 times daily PRN Route: Oral  PRN Reasons: Heartburn,Indigestion  Start: 03/05/24 1536             dextrose 10% bolus 125 mL 125 mL  Dose: 12.5  g  Freq: As needed (PRN) Route: IV  PRN Reason: For blood glucose  PRN Comment: between 50 - 70 mg/dL if patient cannnot take PO or oral glucose is not ordered, (12.5g = 125 mL)  Start: 03/08/24 1519             dextrose 10% bolus 250 mL 250 mL  Dose: 25 g  Freq: As needed (PRN) Route: IV  PRN Reason: For blood glucose  PRN Comment: less than 50 mg/dL if patient cannnot take PO or oral glucose is not ordered, (25 g = 250 mL)  Start: 03/08/24 1519             glucagon (human recombinant) injection 1 mg  Dose: 1 mg  Freq: As needed (PRN) Route: IM  PRN Reason: Blood Glucose less than  PRN Comment: 70 mg/dL if patient is unconscious or obtunded and DOES NOT HAVE IV ACCESS.  Start: 03/08/24 1519   Admin Instructions:   Turn patient on their side, give IM, and NOTIFY MD IMMEDIATELY.    Feed the patient as soon as patient awakens and is able to swallow.  Reconstitute with 1 mL of sterile water for injection. Swirl vial gently until dissolved. Concentration of reconstituted solution is 1 mg/mL.             glucose chewable tablet 16 g  Dose: 16 g  Freq: As needed (PRN) Route: Oral  PRN Reason: For blood glucose  PRN Comment: 50-70 mg/dL X 1 dose for patients who can take PO.  Start: 03/08/24 1519   Admin Instructions:   (16 grams = #  four 4gm glucose tablets)             glucose chewable tablet 24 g  Dose: 24 g  Freq: As needed (PRN) Route: Oral  PRN Reason: For blood glucose  PRN Comment: 50 mg/dL X 1 dose for patients who can take PO.  Start: 03/08/24 1519   Admin Instructions:   (24 grams = # six 4gm glucose tablets)             hydrALAZINE tablet 10 mg  Dose: 10 mg  Freq: Daily PRN Route: Oral  PRN Reason: Other  PRN Comment: SBP > 180  Start: 03/07/24 1115             insulin aspart U-100 pen 0-5 Units  Dose: 0-5 Units  Freq: Before meals & nightly PRN Route: SubQ  PRN Reason: For blood glucose  PRN Comment: Hyperglycemia  Start: 03/11/24 1521   Admin Instructions:   **LOW CORRECTION DOSE**  Blood Glucose  mg/dL       "            Pre-meal                2200  151-200                0 unit                      0 unit  201-250                2 units                    1 unit  251-300                3 units                    1 unit  301-350                4 units                    2 units  >350                     5 units                    3 units  Administer subcutaneously if needed at times designated by monitoring schedule.  DO NOT HOLD correction dose insulin for patients who are  NPO.  "HIGH ALERT MEDICATION" - Administer with meals or TF/TPN.    0206     0917              melatonin tablet 6 mg  Dose: 6 mg  Freq: Nightly PRN Route: Oral  PRN Reason: Insomnia  Start: 03/05/24 1536             ondansetron injection 4 mg  Dose: 4 mg  Freq: Every 6 hours PRN Route: IV  PRN Reason: Nausea/Vomiting (1st choice) - use as first treatment  Start: 03/06/24 0137              sodium chloride 0.9% flush 10 mL  Dose: 10 mL  Freq: Every 6 hours Route: IV  Start: 03/06/24 0100   Admin Instructions:   to maintain patency    0029     0610     1240     1800      0000     0600     1200     1800      0000     0600     1200     1800      0000     0600     1200     1800           And  sodium chloride 0.9% flush 10 mL  Dose: 10 mL  Freq: As needed (PRN) Route: IV  PRN Reason: Line Care  Start: 03/06/24 0052   Admin Instructions:   to maintain patency             sodium chloride 0.9% flush 10 mL  Dose: 10 mL  Freq: Every 12 hours PRN Route: IV  PRN Reason: Line Care  Start: 03/05/24 1536   Admin Instructions:   Flush IV line(s) per unit or hospital guidelines    1240              triamcinolone acetonide 0.025% ointment  Freq: 2 times daily PRN Route: Top  PRN Comment: to affected area  Start: 03/05/24 1535   Admin Instructions:   Apply to wounds twice daily                             Saji Castañeda #[id number] (CSN: [id number]) (74 y.o. M) (Adm: 03/05/24)  [id number]  Results    Procedure Component Value Units Date/Time   Voriconazole " [[id number]] Collected: 03/18/24 0800   Specimen: Blood Updated: 03/18/24 0846   Comprehensive metabolic panel [[id number]] (Abnormal) Collected: 03/18/24 0608   Specimen: Blood Updated: 03/18/24 0706    Sodium 135 Low  mmol/L     Potassium 4.8 mmol/L     Chloride 101 mmol/L     CO2 24 mmol/L     Glucose 277 High  mg/dL     BUN 29 High  mg/dL     Creatinine 1.9 High  mg/dL     Calcium 8.2 Low  mg/dL     Total Protein 6.3 g/dL     Albumin 1.9 Low  g/dL     Total Bilirubin 0.4 mg/dL     Alkaline Phosphatase 69 U/L     AST 17 U/L     ALT 9 Low  U/L     eGFR 36.3 Abnormal  mL/min/1.73 m^2     Anion Gap 10 mmol/L    Magnesium [[id number]] Collected: 03/18/24 0608   Specimen: Blood Updated: 03/18/24 0706    Magnesium 1.9 mg/dL    Phosphorus [[id number]] Collected: 03/18/24 0608   Specimen: Blood Updated: 03/18/24 0706    Phosphorus 4.5 mg/dL    CBC with Automated Differential [[id number]] (Abnormal) Collected: 03/18/24 0608   Specimen: Blood Updated: 03/18/24 0634    WBC 9.36 K/uL     RBC 3.24 Low  M/uL     Hemoglobin 7.9 Low  g/dL     Hematocrit 25.9 Low  %     MCV 80 Low  fL     MCH 24.4 Low  pg     MCHC 30.5 Low  g/dL     RDW 16.7 High  %     Platelets 565 High  K/uL     MPV 8.9 Low  fL     Immature Granulocytes 0.3 %     Gran # (ANC) 7.7 K/uL     Immature Grans (Abs) 0.03 K/uL     Lymph # 1.0 K/uL     Mono # 0.5 K/uL     Eos # 0.0 K/uL     Baso # 0.05 K/uL     nRBC 0 /100 WBC     Gran % 82.7 High  %     Lymph % 10.7 Low  %     Mono % 5.4 %     Eosinophil % 0.4 %     Basophil % 0.5 %     Differential Method Automated   Vitamin B12 [[id number]] Collected: 03/17/24 0514   Specimen: Blood Updated: 03/17/24 0818    Vitamin B-12 504 pg/mL    Hemoglobin [[id number]] (Abnormal) Collected: 03/16/24 2116   Specimen: Blood Updated: 03/16/24 2125    Hemoglobin 8.0 Low  g/dL    Hematocrit [[id number]] (Abnormal) Collected: 03/16/24 2116   Specimen: Blood Updated: 03/16/24 2125    Hematocrit 26.4 Low  %    Ferritin [[id number]]  Collected: 03/16/24 0948    Updated: 03/16/24 1746    Ferritin 144 ng/mL    Narrative:     add on tono and ironp per  /order#[id number] and [id number] @  03/16/2024  17:10   Iron and TIBC [[id number]] (Abnormal) Collected: 03/16/24 0948    Updated: 03/16/24 1729    Iron 11 Low  ug/dL     Transferrin 133 Low  mg/dL     TIBC 197 Low  ug/dL     Saturated Iron 6 Low  %    Narrative:     add on tono and ironp per  /order#[id number] and [id number] @  03/16/2024  17:10   Type & Screen [[id number]] Collected: 03/16/24 1324   Specimen: Blood Updated: 03/16/24 1429    Group & Rh A POS    Indirect Faustino NEG    Specimen Outdate 03/19/2024 23:59   Comprehensive metabolic panel [[id number]] (Abnormal) Collected: 03/16/24 0948   Specimen: Blood Updated: 03/16/24 1047    Sodium 140 mmol/L     Potassium 4.2 mmol/L     Chloride 105 mmol/L     CO2 26 mmol/L     Glucose 138 High  mg/dL     BUN 20 mg/dL     Creatinine 1.5 High  mg/dL     Calcium 8.4 Low  mg/dL     Total Protein 6.4 g/dL     Albumin 1.7 Low  g/dL     Total Bilirubin 0.2 mg/dL     Alkaline Phosphatase 76 U/L     AST 17 U/L     ALT 11 U/L     eGFR 48.2 Abnormal  mL/min/1.73 m^2     Anion Gap 9 mmol/L    CBC auto differential [[id number]] (Abnormal) Collected: 03/16/24 0948   Specimen: Blood Updated: 03/16/24 1031    WBC 8.79 K/uL     RBC 2.83 Low  M/uL     Hemoglobin 6.9 Low  g/dL     Hematocrit 22.8 Low  %     MCV 81 Low  fL     MCH 24.4 Low  pg     MCHC 30.3 Low  g/dL     RDW 17.2 High  %     Platelets 604 High  K/uL     MPV 8.8 Low  fL     Immature Granulocytes 0.5 %     Gran # (ANC) 6.4 K/uL     Immature Grans (Abs) 0.04 K/uL     Lymph # 1.6 K/uL     Mono # 0.6 K/uL     Eos # 0.2 K/uL     Baso # 0.06 K/uL     nRBC 0 /100 WBC     Gran % 72.4 %     Lymph % 17.6 Low  %     Mono % 6.6 %     Eosinophil % 2.2 %     Basophil % 0.7 %     Differential Method Automated

## 2024-03-18 NOTE — PROGRESS NOTES
[facility] - Tele[facility]  Progress Note    Patient Name: Saji Castañeda  MRN: [id number]  Patient Class: IP- Inpatient   Admission Date: 3/5/2024  Length of Stay: 13 days  Attending Physician: Jm Rosa MD  Primary Care Provider: [facility] -        Subjective:     Principal Problem:Osteomyelitis of left lower extremity        HPI:  Saji Castañeda is a 74 y.o. male with a medical history of DM2, HLD, HTN, chronic osteomyelitis of L heel, L TMA, PAD, hx of ESBL klebsiella, acinetobacter bacteremia, presenting with hyperglycemia. He presented to the ED via EMS and his POCT glucose on arrival was >500. Serum blood glucose 667 with anion gap of 17 and elevated ketones. Serum potassium 3.4, corrected sodium 149. Urinalysis significant for RBCs, glucosuria, and moderate bacteria and yeast. CBC revealed leukocytosis, elevated platelets, and mild anemia. CMP shows Na 135, K 3.4, BUN 29, Cr 2.1, Glucose 677, Lactate 4.61. BNP and troponin elevated. GFR 32.2. Insulin drip, IV fluids, zosyn, vancomycin, and potassium given.      He is unable to provide much history, but states that he has not been taking his home medications for at least a week. He reports shortness of breath, fatigue, and weakness for the last 6-7 days. He has chills and reports episodes of emesis. He denies abdominal pain, chest pain, palpitations, recent illness/infection, fevers, changes to bowel movements and urinary output. Patient lives with his brother and sister at home. He was last seen in the ED on 2/21 after a fall. He was hypoglycemic BGL 60 at that time which returned to normal after eating. He was also scheduled for a wound clinic appointment today 3/5 at [facility] with Dr. Wilson.        Overview/Hospital Course:  Wound care and cultures collected by Podiatry with recs for Vascular consult for amputation. Patient declined amputation. ID recommending IV Ertapenem for chronic osteomyelitis for 6 week duration  (EED: 4/15/24). With high wound care needs and IV antibiotics plan patient needing long-term placement. He is being treated with topical permethrin for bedbugs. Wound care has been dressing the wounds. 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 Still adjusting insulin to control sugars better. Giving fluids for YNES. Poor PO intake even with feeding assistance. LTAC authorization denied by [employer]. Waiting for SNF placement ([facility], awaiting auth and family to complete paperwork).       Interval History: Giving  IVF bolus for YNES. Missed 15 units detemir last night and hyperglycemic and 2am and in the 300s by this morning. Patient has poor PO intake despite nurses helping with feeding. Accepted to [facility]    Review of Systems  Objective:     Vital Signs (Most Recent):  Temp: 99.3 °F (37.4 °C) (03/18/24 1214)  Pulse: 66 (03/18/24 1214)  Resp: 18 (03/18/24 1214)  BP: (!) 135/53 (03/18/24 1214)  SpO2: (!) 94 % (03/18/24 1214) Vital Signs (24h Range):  Temp:  [98.2 °F (36.8 °C)-99.3 °F (37.4 °C)] 99.3 °F (37.4 °C)  Pulse:  [65-91] 66  Resp:  [17-22] 18  SpO2:  [91 %-94 %] 94 %  BP: (118-146)/(49-63) 135/53     Weight: 120.2 kg (264 lb 15.9 oz)  Body mass index is 39.13 kg/m².  No intake or output data in the 24 hours ending 03/18/24 1320      Physical Exam  Constitutional:       General: He is not in acute distress.     Appearance: Normal appearance. He is well-developed. He is ill-appearing (chronically ill appearing). He is not diaphoretic.   HENT:      Head: Normocephalic and atraumatic.      Right Ear: External ear normal.      Left Ear: External ear normal.      Nose: Nose normal.   Eyes:      General: No scleral icterus.        Right eye: No discharge.         Left eye: No discharge.      Conjunctiva/sclera: Conjunctivae normal.   Pulmonary:      Effort: Pulmonary effort is normal. No respiratory distress.      Breath sounds: No stridor.   Skin:      General: Skin is dry.      Comments: Wounds of legs, back, buttocks   Neurological:      General: No focal deficit present.      Mental Status: He is alert and oriented to person, place, and time. Mental status is at baseline.   Psychiatric:         Mood and Affect: Mood normal.         Behavior: Behavior normal.         Thought Content: Thought content normal.         Judgment: Judgment normal.             Significant Labs: All pertinent labs within the past 24 hours have been reviewed.  CBC:   Recent Labs   Lab 03/16/24  2116 03/18/24  0608   WBC  --  9.36   HGB 8.0* 7.9*   HCT 26.4* 25.9*   PLT  --  565*     CMP:   Recent Labs   Lab 03/18/24  0608   *   K 4.8      CO2 24   *   BUN 29*   CREATININE 1.9*   CALCIUM 8.2*   PROT 6.3   ALBUMIN 1.9*   BILITOT 0.4   ALKPHOS 69   AST 17   ALT 9*   ANIONGAP 10     POCT Glucose:   Recent Labs   Lab 03/18/24  0204 03/18/24  0804 03/18/24  1212   POCTGLUCOSE 248* 305* 145*       Significant Imaging: I have reviewed all pertinent imaging results/findings within the past 24 hours.    Assessment/Plan:      * Osteomyelitis of left lower extremity  Patient with Proteus and enterobacter on leg cultures collected by Podiatry with concern for acute on chronic osteo. Fungal culture grew fusarium    - IV ertapenem with end date 4/15/24   - Added voriconazole for fusarium coverage which grew on fungal culture - 3 months  - midline catheters placed on admission for difficult IV access  -Still thinking about the amputation recommended by vascular surgery - has been given enough time at this point and demonstrates poor understanding, conversations about amputation have been unproductive for the past week     Localized swelling of right upper extremity  Patient has swelling and pain in the right arm and some swelling in the left arm as well    -US b/l upper extremity for DVT showed clot, increased his eliquis from 5 mg to 10 mg and removed his central line.       History  "of Bedbug bite with infection  Patient started on Permethrin daily for 5 days starting 3/12      Proteus infection    On ertapenem     Chronic anticoagulation  - See DVT      History of amputation of left high mid foot   See osteo      Stage 3b chronic kidney disease  Creatine stable for now. BMP reviewed- noted Estimated Creatinine Clearance: 54.5 mL/min (A) (based on SCr of 1.5 mg/dL (H)). according to latest data. Based on current GFR, CKD stage is stage 3 - GFR 30-59.  Monitor UOP and serial BMP and adjust therapy as needed. Renally dose meds. Avoid nephrotoxic medications and procedures.    Severe sepsis  This patient does have evidence of infective focus  My overall impression is sepsis.  Source: Skin and Soft Tissue (location left leg)  Antibiotics given-   Antibiotics (72h ago, onward)      Start     Stop Route Frequency Ordered    03/08/24 1230  ertapenem (INVANZ) 1 g in sodium chloride 0.9 % 100 mL IVPB (MB+)         -- IV Every 24 hours (non-standard times) 03/08/24 1115          Latest lactate reviewed-  No results for input(s): "LACTATE", "POCLAC" in the last 72 hours.    Organ dysfunction indicated by Acute kidney injury    Fluid challenge Actual Body weight- Patient will receive 30ml/kg actual body weight to calculate fluid bolus for treatment of septic shock.     Post- resuscitation assessment No - Post resuscitation assessment not needed       Will Not start Pressors-     - See osteo      Other hyperlipidemia  Continue home atorvastatin      Diabetic ketoacidosis without coma associated with type 2 diabetes mellitus  Patient's FSGs are uncontrolled due to hyperglycemia on current medication regimen.  Last A1c reviewed-   Lab Results   Component Value Date    HGBA1C >14.0 (H) 03/05/2024     Most recent fingerstick glucose reviewed-   Recent Labs   Lab 03/17/24  2200 03/18/24  0204 03/18/24  0804 03/18/24  1212   POCTGLUCOSE 155* 248* 305* 145*     Current correctional scale   LD SSI  Maintain " anti-hyperglycemic dose as follows-   Antihyperglycemics (From admission, onward)      Start     Stop Route Frequency Ordered    03/19/24 1130  insulin aspart U-100 pen 10 Units         -- SubQ with lunch 03/18/24 1316    03/19/24 0715  insulin aspart U-100 pen 10 Units         -- SubQ with breakfast 03/18/24 1316    03/18/24 0900  insulin detemir U-100 (Levemir) pen 30 Units         -- SubQ Daily 03/17/24 1758    03/17/24 2100  insulin detemir U-100 (Levemir) pen 15 Units         03/18/24 2059 SubQ Nightly 03/17/24 1806    03/11/24 1521  insulin aspart U-100 pen 0-5 Units         -- SubQ Before meals & nightly PRN 03/11/24 1520          Hold Oral hypoglycemics while patient is in the hospital.  Will keep patient on DKA pathway with insulin drip and fluid with protocol in place for switching to subcutaneous insulin as anion gap closes.      DKA  HHS  - Resolved  - Continue insulin regimen and BG checks TIDWM and QHS and 2am  - patient tends to become hypoglycemic around dinnertime  - could be insulin stacking from breakfast and lunch insulin aspart given poor PO intake despite assistance with feeding   - detemir switched from qhs to qam - may need BID  - aspart scheduled w/ only breakfast and lunch to avoid hypoglycemia  - consider endocrine consult if still not in range with current adjustments      Paroxysmal atrial fibrillation  Patient with Persistent (7 days or more) atrial fibrillation which is uncontrolled currently with    . Patient is currently in sinus rhythm.[id number] Score: 4. . Anticoagulation indicated. Anticoagulation done with lovenox as we could not get IV access for heparin  .    -apixaban 5 mg after finishing loading 10 mg eliquis for DVT    Chronic deep vein thrombosis (DVT) of right upper extremity  - eliquis loading dose following by maintenance       Cellulitis of left lower leg  See osteo      Peripheral arterial disease  Resume plavix      YNES (acute kidney injury)  Patient with acute  kidney injury/acute renal failure likely due to pre-renal azotemia due to dehydration YNES is currently worsening. Baseline creatinine  1.1  - Labs reviewed- Renal function/electrolytes with Estimated Creatinine Clearance: 43 mL/min (A) (based on SCr of 1.9 mg/dL (H)). according to latest data. Monitor urine output and serial BMP and adjust therapy as needed. Avoid nephrotoxins and renally dose meds for GFR listed above.    Creatinine 2.1 on admit, baseline around 1.1  - Likely pre-renal from poor PO intake    Lab Results   Component Value Date    CREATININE 1.9 (H) 03/18/2024       Plan:   - giving 1 liter of LR  - Check urine lytes urine protein creatinine ratio if YNES does not improve w/ fluids  - Strict I&Os and daily weights   - Daily BMP  - Trend sCr  - Avoid nephrotoxic agents such as NSAIDs, ACEi, ARBs and IV radiocontrast.  - Renally dose meds to current GFR.  - Maintain MAP > 65.  - No indications for HD at this time.   - Maintain electrolytes at goal: Mg > 2, Phos > 3, K > 4.         Essential hypertension  Chronic, uncontrolled. Latest blood pressure and vitals reviewed-     Temp:  [98.2 °F (36.8 °C)-99.3 °F (37.4 °C)]   Pulse:  [65-91]   Resp:  [17-22]   BP: (118-146)/(49-63)   SpO2:  [91 %-94 %] .   Home meds for hypertension were reviewed and noted below.   Hypertension Medications               furosemide (LASIX) 40 MG tablet Take 20 mg by mouth.    hydrALAZINE (APRESOLINE) 100 MG tablet Take 1 tablet (100 mg total) by mouth every 8 (eight) hours.    isosorbide dinitrate (ISORDIL) 10 MG tablet Take 1 tablet (10 mg total) by mouth 3 (three) times daily.    NIFEdipine (PROCARDIA-XL) 60 MG (OSM) 24 hr tablet Take 1 tablet (60 mg total) by mouth once daily.            While in the hospital, will manage blood pressure as follows; Adjust home antihypertensive regimen as follows- will keep nifedipine - increased to 90     Will utilize p.r.n. blood pressure medication only if patient's blood pressure greater  than 180/110 and he develops symptoms such as worsening chest pain or shortness of breath.      VTE Risk Mitigation (From admission, onward)           Ordered     apixaban tablet 5 mg  2 times daily        See Hyperspace for full Linked Orders Report.    03/15/24 0954     apixaban tablet 10 mg  2 times daily        See Hyperspace for full Linked Orders Report.    03/15/24 0954                    Discharge Planning   OXANA: 3/19/2024     Code Status: Full Code   Is the patient medically ready for discharge?: Yes    Reason for patient still in hospital (select all that apply): Patient trending condition and Pending disposition  Discharge Plan A: Skilled Nursing Facility   Discharge Delays: None known at this time              Milagros Nicolas MD  Department of Hospital Medicine   [facility] - Telemetry Stepdown

## 2024-03-18 NOTE — PT/OT/SLP PROGRESS
Physical Therapy Treatment    Patient Name:  Saji Castañeda   MRN:  [id number]  Admitting Diagnosis:  Osteomyelitis of left lower extremity   Recent Surgery: * No surgery found *    Admit Date: 3/5/2024  Length of Stay: 13 days    Recommendations:     Discharge Recommendations:  Moderate Intensity Therapy  Discharge Equipment Recommendations: lift device, hospital bed   Justification for Equipment: Patient requires a hospital bed for positioning of the body in ways that are not feasible with an ordinary bed. The patient requires special positioning for pain relief, limited mobility, and/or being unable to independently make changes in body position without the use of a hospital bed. Pillows and wedges will not be adequate for resolving these positional issues.   Barriers to discharge: Evolving Clinical Presentation    Assessment:     Saji Castañeda is a 74 y.o. male admitted with a medical diagnosis of Osteomyelitis of left lower extremity.  He presents with the following impairments/functional limitations:  weakness, impaired self care skills, impaired functional mobility, impaired endurance, decreased lower extremity function, orthopedic precautions, impaired balance, decreased safety awareness, pain.     Pt agreeable to therapy after short power nap, decreased assistance needed for bed mobility, poor EOB balance with strong R lean, wound noted on LLE near hip. Pt with poor sitting tolerance and quickly wants to return to supine.     Rehab Prognosis: Poor; patient would benefit from acute skilled PT services to address these deficits and reach maximum level of function.    Recent Surgery: * No surgery found *      Treatment Tolerated: Fairly Poorly    Highest level of mobility achieved this visit: sits EOB from supine with max A    Activity with RN/PCT: bed mobility only with two person assist    Plan:     During this hospitalization, patient to be seen 2 x/week to address the identified rehab impairments via  "therapeutic activities, neuromuscular re-education, wheelchair management/training, therapeutic exercises and progress toward the following goals:    Plan of Care Expires:  04/06/24    Subjective     RN Linda notified prior to session. No family present upon PT entrance into room.    Chief Complaint: fatigue  Patient/Family Comments/goals: "Let me take a quick nap while you get ready"  Pain/Comfort:  Pain Rating 1: 0/10      Objective:     Additional staff present: none    Patient found HOB elevated with: telemetry, pressure relief boots, Condom Catheter   Cognition:   Lethargic  Command following: Follows one-step verbal commands  Fluency: difficult to understand, mumbles  General Precautions: Standard, fall, contact   Orthopedic Precautions:LLE non weight bearing, RLE weight bearing as tolerated   Braces:     Body mass index is 39.13 kg/m².  Oxygen Device: Room Air    Vitals: /60   Pulse 69   Temp 99 °F (37.2 °C) (Oral)   Resp 16   Ht 5' 9" (1.753 m)   Wt 120.2 kg (264 lb 15.9 oz)   SpO2 100%   BMI 39.13 kg/m²     Outcome Measures:  AM-PAC 6 CLICK MOBILITY  Turning over in bed (including adjusting bedclothes, sheets and blankets)?: 2  Sitting down on and standing up from a chair with arms (e.g., wheelchair, bedside commode, etc.): 1  Moving from lying on back to sitting on the side of the bed?: 2  Moving to and from a bed to a chair (including a wheelchair)?: 1  Need to walk in hospital room?: 1  Climbing 3-5 steps with a railing?: 1  Basic Mobility Total Score: 8     Functional Mobility:    Bed Mobility:   Rolling/Turning to Left: maximal assistance  Rolling/Turning to Right: maximal assistance  Scooting to HOB via supine bridge: maximal assistance  Supine to Sit: maximal assistance; from R side of bed  Scooting anteriorly to EOB to have both feet planted on floor: maximal assistance  Sit to Supine: maximal assistance; to L side of bed    Sitting Balance at Edge of Bed:  Assistance Level Required: " Total Assistance  Time: 5 min  Postural deviations noted: trunk deviated right  Encouraged: midline alignment  Comments: Leans away from wound on LLE    Transfers: Deferred due to poor EOB tolerance    Education:  Time provided for education, counseling and discussion of health disposition in regards to patient's current status  All questions answered within PT scope of practice and to patient's satisfaction  PT role in POC to address current functional deficits  Pt educated on proper body mechanics, safety techniques, and energy conservation with PT facilitation and cueing throughout session    Patient left HOB elevated with all lines intact and call button in reach.    GOALS:   Multidisciplinary Problems       Physical Therapy Goals          Problem: Physical Therapy    Goal Priority Disciplines Outcome Goal Variances Interventions   Physical Therapy Goal     PT, PT/OT Ongoing, Progressing     Description: Goals to be met by: 24     Patient will increase functional independence with mobility by performin. Supine to sit with Moderate Assistance  2. Sit to supine with Moderate Assistance  3. Rolling to Left and Right with Moderate Assistance.  4. Sit to stand transfer with Maximum Assistance with LRAD  5. Bed to chair transfer with Moderate Assistance using LRAD  6. Wheelchair propulsion x100 feet with Stand-by Assistance using UE/LE  7. Sitting at edge of bed 10 minutes with Modified [place]  8. Lower extremity exercise program x30 reps per handout, with assistance as needed                       Time Tracking:     PT Received On: 24  PT Start Time: 1120     PT Stop Time: 1139  PT Total Time (min): 19 min     Billable Minutes:   Therapeutic Activity 10 min    Treatment Type: Treatment  PT/PTA: PT       Magdi Kumar, PT, DPT  3/18/2024

## 2024-03-18 NOTE — ASSESSMENT & PLAN NOTE
Patient with Persistent (7 days or more) atrial fibrillation which is uncontrolled currently with    . Patient is currently in sinus rhythm.[id number] Score: 4. . Anticoagulation indicated. Anticoagulation done with lovenox as we could not get IV access for heparin  .    -apixaban 5 mg after finishing loading 10 mg eliquis for DVT

## 2024-03-18 NOTE — PLAN OF CARE
NURSING HOME ORDERS    03/17/2024  [facility]  GLADIS SHEEHAN - TELEMETRY STEPDOWN  [address]  [facility] [address]  Dept: [phone]  Loc: [phone]     Admit to Nursing Home:  [facility]    Diagnoses:  Active Hospital Problems    Diagnosis  POA    *Osteomyelitis of left lower extremity [M86.9]  Yes    Localized swelling of right upper extremity [R22.31]  Unknown    Proteus infection [A49.8]  Yes     Specimen Information: Leg, Left; Wound   0 Result Notes      Component 3 d ago from 3/9/24   Aerobic Bacterial Culture  Abnormal   PROTEUS MIRABILIS  Few    Aerobic Bacterial Culture  Abnormal   ENTEROBACTER CLOACAE  Few  Skin bossman also present    Resulting Agency [facility]        Susceptibility       Proteus mirabilis Enterobacter cloacae     CULTURE, AEROBIC  (SPECIFY SOURCE) CULTURE, AEROBIC  (SPECIFY SOURCE)     Amox/K Clav'ate <=8/4 mcg/mL Sensitive       Amp/Sulbactam <=8/4 mcg/mL Sensitive       Ampicillin <=8 mcg/mL Sensitive       Cefazolin <=2 mcg/mL Sensitive       Cefepime <=2 mcg/mL Sensitive >16 mcg/mL Resistant     Ceftriaxone <=1 mcg/mL Sensitive >32 mcg/mL Resistant     Ciprofloxacin <=1 mcg/mL Sensitive <=1 mcg/mL Sensitive     Ertapenem <=0.5 mcg/mL Sensitive <=0.5 mcg/mL Sensitive     Gentamicin <=4 mcg/mL Sensitive <=4 mcg/mL Sensitive     Levofloxacin <=2 mcg/mL Sensitive <=2 mcg/mL Sensitive     Meropenem <=1 mcg/mL Sensitive <=1 mcg/mL Sensitive     Piperacillin/Tazo <=16 mcg/mL Sensitive <=16 mcg/mL Sensitive     Tetracycline   <=4 mcg/mL Sensitive     Tobramycin <=4 mcg/mL Sensitive <=4 mcg/mL Sensitive     Trimeth/Sulfa <=2/38 mcg/mL Sensitive <=2/38 mcg/mL Sensitive                   History of Bedbug bite with infection [T14.8XXA, L08.9]  Yes     Rx Permethrin daily X 3 days (topical 3/8/24)/      History of amputation of left high mid foot  [Z89.432]  Not Applicable    Chronic anticoagulation [Z79.01]  Not Applicable    Diabetic  ketoacidosis without coma associated with type 2 diabetes mellitus [E11.10]  Yes    Other hyperlipidemia [E78.49]  Yes    Severe sepsis [A41.9, R65.20]  Yes    Stage 3b chronic kidney disease [N18.32]  Yes    Paroxysmal atrial fibrillation [I48.0]  Yes     Chronic    Chronic deep vein thrombosis (DVT) of right upper extremity [I82.721]  Yes     Chronic    Cellulitis of left lower leg [L03.116]  Yes    Peripheral arterial disease [I73.9]  Yes     - long standing wounds with skin graft to left TMA in [id number]; LE angio in 2014 with patent left CFA, SFA, PFA with 3V run off on LE angiogram by Vascular surgery  - CTA LE with run off 6/2022 with moderate to high grade disease of right popliteal with severely diseased AT, PT, peroneal and multiple occlusion of left popliteal with occluded PT and peroneal and short segment occlusion of AT- patient denies any previous intervention  - BLE arterial ultrasound 3/2023 with similar findings suggestive of bilateral popliteal and BTK disease; seen by Vascular surgery at [facility] with recommendation for amputation- patient declined  - transferred to [facility] 05/2023 for evaluation for revascularization; extensive disease and multiple co morbidities along with debility/bed bound status and concern for non compliance, deemed not a  Revascularization candidate and placed on statin and Plavix; no ASA given risk of bleeding with triple therapy        Essential hypertension [I10]  Yes     Chronic      Resolved Hospital Problems    Diagnosis Date Resolved POA    Elevated troponin [R79.89] 03/07/2024 Yes       Patient is homebound due to:  Osteomyelitis of left lower extremity    Allergies:Review of patient's allergies indicates:  No Known Allergies    Vitals:  Routine    Diet: diabetic diet: 2000 calorie and 2 gram sodium diet    Activities:   Activity as tolerated    Goals of Care Treatment Preferences:  Code Status: Full Code    Health care agent: January Nicholson [phone]  [facility]  care agent number: [phone]          What is most important right now is to focus on avoiding the hospital, remaining as independent as possible.  Accordingly, we have decided that the best plan to meet the patient's goals includes continuing with treatment.      Labs:  the following labs to be drawn on Mondays:   CBC  CMP   CRP    Nursing Precautions:  Aspiration , Fall, and Pressure ulcer prevention    Consults:   PT to evaluate and treat- 3 times a week, OT to evaluate and treat- 3 times a week, Wound Care, and Nutrition to evaluate and recommend diet     Miscellaneous Care: Routine Skin for Bedridden Patients:  Apply moisture barrier cream to all  Wound Care: yes:  Pressure Ulcer(s) Stage II :     Location: bilateral buttocks  Cleanse bilateral buttocks and sacrum with vashe and pat dry. Apply vashe soaked gauze to wounds. Cover with ABD pad and secure with medipore tape. Change BID and PRN if soiled                        Frequency:  Twice Daily and prn                              If incontinent of stool or urine, apply thin layer Barrier cream                   twice daily and PRN to wound         Pressure relief measure:  Immerse bed    Left Dorsal Greater Trochanter:  Cleanse left dorsal greater trochanter wound with vashe and pat dry. Packed with vashe soaked gauze and cover with mepilex foam border dressing. Change BID and PRN if soiled.     Right back:   Cleanse right back wound with vashe and pat dry. Apply mepilex foam border dressing. Change every three days or PRN if soiled.     Bilateral lower extremities  Please cleanse wounds with wound Vashe her sterile saline.  Then dress left lower extremity posterior heel wound with Hydrofera blue as well as left lateral leg wound with Hydrofera blue and then follow with several layers of Kerlix and Ace wrap.  Dressed right foot with gauze on wounds followed by Kerlix and Ace wrap.        Diabetes Care: Diabetes: Check blood sugar. Fingerstick blood sugar  AC and HS  Sliding Scale/Hypoglycemia Protocol: For FSG<80, give 1 amp D50 or 1 glucose tablet. For FSG , do nothing. For -200, give 1 unit of novolog in addition to pre-meal insulin. For -250, give 2 units of novolog in addition to pre-meal insulin. For -300, give 3 units of novolog in addition to pre-meal insulin. For 301-350, give 4 units of novolog in addition to pre-meal insulin. For 351-400, give 5 units of novolog in addition to pre-meal insulin. For FSG >400, give 5 units of novolog in addition to pre-meal insulin and please call MD                   Diabetes Care:  SN to perform and educate Diabetic management with blood glucose monitoring:, Fingerstick blood sugar AC and HS, and Report CBG < 60 or > 350 to physician.      Medications: Discontinue all previous medication orders, if any. See new list below.     Medication List        START taking these medications      sodium chloride 0.9 % PgBk 100 mL with ertapenem 1 gram SolR 1 g  Inject 1 g into the vein once daily.     voriconazole 200 MG Tab  Commonly known as: VFEND  Take 1 tablet (200 mg total) by mouth 2 (two) times daily.  Start taking on: March 18, 2024            CHANGE how you take these medications      * apixaban 5 mg Tab  Commonly known as: ELIQUIS  Take 2 tablets (10 mg total) by mouth 2 (two) times daily. for 5 doses  What changed: how much to take     * apixaban 5 mg Tab  Commonly known as: ELIQUIS  Take 1 tablet (5 mg total) by mouth 2 (two) times daily.  Start taking on: March 21, 2024  What changed: You were already taking a medication with the same name, and this prescription was added. Make sure you understand how and when to take each.     isosorbide dinitrate 10 MG tablet  Commonly known as: ISORDIL  Take 1 tablet (10 mg total) by mouth 3 (three) times daily. Hold until follow up with a provider  What changed: additional instructions     LANTUS SOLOSTAR U-100 INSULIN glargine 100 units/mL SubQ pen  Generic  "drug: insulin  Inject 30 Units into the skin once daily.  What changed:   how much to take  when to take this           * This list has 2 medication(s) that are the same as other medications prescribed for you. Read the directions carefully, and ask your doctor or other care provider to review them with you.                CONTINUE taking these medications      acetaminophen 325 MG tablet  Commonly known as: TYLENOL  Take 650 mg by mouth every 6 (six) hours as needed for Temperature greater than.     acidophilus-pectin, citrus 100 million cell-10 mg Cap  Take 1 capsule by mouth 2 (two) times a day.     ascorbic acid (vitamin C) 500 MG tablet  Commonly known as: VITAMIN C  Take 500 mg by mouth 2 (two) times daily.     B COMPLEX-VITAMIN B12 tablet  Generic drug: b complex vitamins  Take 1 tablet by mouth once daily.     BD AUTOSHIELD DUO PEN NEEDLE 30 gauge x 3/16" Ndle  Generic drug: pen needle,diabetic dual safty     * BD ULTRA-FINE ADITYA PEN NEEDLE 32 gauge x 5/32" Ndle  Generic drug: pen needle, diabetic  USE FOUR TIMES DAILY WITH MEALS AND NIGHTLY     * BD ULTRA-FINE ADITYA PEN NEEDLE 32 gauge x 5/32" Ndle  Generic drug: pen needle, diabetic  use as directed with insulin     blood sugar diagnostic Strp  Commonly known as: CONTOUR TEST STRIPS  Inject 1 strip into the skin 2 (two) times daily before meals.     clopidogreL 75 mg tablet  Commonly known as: PLAVIX  Take 1 tablet (75 mg total) by mouth once daily.     FLOMAX 0.4 mg Cap  Generic drug: tamsulosin  Take 0.4 mg by mouth once daily.     gabapentin 300 MG capsule  Commonly known as: NEURONTIN  Take 300 mg by mouth 2 (two) times daily.     insulin aspart U-100 100 unit/mL (3 mL) Inpn pen  Commonly known as: NovoLOG  Inject 6 Units into the skin 3 (three) times daily with meals.     MICROLET LANCET Misc  Generic drug: lancets     multivitamin per tablet  Commonly known as: THERAGRAN  Take 1 tablet by mouth once daily.     NIFEdipine 60 MG (OSM) 24 hr " tablet  Commonly known as: PROCARDIA-XL  Take 1 tablet (60 mg total) by mouth once daily.     pantoprazole 40 MG tablet  Commonly known as: PROTONIX  Take 40 mg by mouth once daily. Before breakfast     rosuvastatin 40 MG Tab  Commonly known as: CRESTOR  Take 40 mg by mouth once daily.     triamcinolone acetonide 0.025% 0.025 % Oint  Commonly known as: KENALOG  Apply topically 2 (two) times daily as needed (to affected area).           * This list has 2 medication(s) that are the same as other medications prescribed for you. Read the directions carefully, and ask your doctor or other care provider to review them with you.                STOP taking these medications      DAKIN'S SOLUTION external solution  Generic drug: sodium hypochlorite 0.5 %     diphenhydrAMINE 25 mg capsule  Commonly known as: BENADRYL     furosemide 40 MG tablet  Commonly known as: LASIX     glipiZIDE 10 MG tablet  Commonly known as: GLUCOTROL     hydrALAZINE 100 MG tablet  Commonly known as: APRESOLINE     miconazole NITRATE 2 % 2 % top powder  Commonly known as: MICOTIN     oxyCODONE 5 mg Tbor                Immunizations Administered as of 3/17/2024       Name Date Dose VIS Date Route Exp Date    COVID-19, MRNA, LN-S, PF (Moderna) 7/13/2021 0.5 mL -- Intramuscular --    Site: Left arm     : [employer]     Lot: [id number]     External: Auto Reconciled From Outside Source     Comment: Adminis     COVID-19, MRNA, LN-S, PF (Moderna) 5/24/2021 0.5 mL -- -- --    : Modern[employer]     Lot: [id number]     External: Auto Reconciled From Outside Source     Comment: Adminis             This patient has had both covid vaccinations    Some patients may experience side effects after vaccination.  These may include fever, headache, muscle or joint aches.  Most symptoms resolve with 24-48 hours and do not require urgent medical evaluation unless they persist for more than 72 hours or symptoms are concerning for an unrelated  medical condition.          _________________________________  Milagros Nicolas MD  03/17/2024

## 2024-03-18 NOTE — SUBJECTIVE & OBJECTIVE
Interval History: Giving  IVF bolus for YNES. Missed 15 units detemir last night and hyperglycemic and 2am and in the 300s by this morning. Patient has poor PO intake despite nurses helping with feeding. Accepted to [facility]    Review of Systems  Objective:     Vital Signs (Most Recent):  Temp: 99.3 °F (37.4 °C) (03/18/24 1214)  Pulse: 66 (03/18/24 1214)  Resp: 18 (03/18/24 1214)  BP: (!) 135/53 (03/18/24 1214)  SpO2: (!) 94 % (03/18/24 1214) Vital Signs (24h Range):  Temp:  [98.2 °F (36.8 °C)-99.3 °F (37.4 °C)] 99.3 °F (37.4 °C)  Pulse:  [65-91] 66  Resp:  [17-22] 18  SpO2:  [91 %-94 %] 94 %  BP: (118-146)/(49-63) 135/53     Weight: 120.2 kg (264 lb 15.9 oz)  Body mass index is 39.13 kg/m².  No intake or output data in the 24 hours ending 03/18/24 1320      Physical Exam  Constitutional:       General: He is not in acute distress.     Appearance: Normal appearance. He is well-developed. He is ill-appearing (chronically ill appearing). He is not diaphoretic.   HENT:      Head: Normocephalic and atraumatic.      Right Ear: External ear normal.      Left Ear: External ear normal.      Nose: Nose normal.   Eyes:      General: No scleral icterus.        Right eye: No discharge.         Left eye: No discharge.      Conjunctiva/sclera: Conjunctivae normal.   Pulmonary:      Effort: Pulmonary effort is normal. No respiratory distress.      Breath sounds: No stridor.   Skin:     General: Skin is dry.      Comments: Wounds of legs, back, buttocks   Neurological:      General: No focal deficit present.      Mental Status: He is alert and oriented to person, place, and time. Mental status is at baseline.   Psychiatric:         Mood and Affect: Mood normal.         Behavior: Behavior normal.         Thought Content: Thought content normal.         Judgment: Judgment normal.             Significant Labs: All pertinent labs within the past 24 hours have been reviewed.  CBC:   Recent Labs   Lab 03/16/24  2116 03/18/24  0608    WBC  --  9.36   HGB 8.0* 7.9*   HCT 26.4* 25.9*   PLT  --  565*     CMP:   Recent Labs   Lab 03/18/24  0608   *   K 4.8      CO2 24   *   BUN 29*   CREATININE 1.9*   CALCIUM 8.2*   PROT 6.3   ALBUMIN 1.9*   BILITOT 0.4   ALKPHOS 69   AST 17   ALT 9*   ANIONGAP 10     POCT Glucose:   Recent Labs   Lab 03/18/24  0204 03/18/24  0804 03/18/24  1212   POCTGLUCOSE 248* 305* 145*       Significant Imaging: I have reviewed all pertinent imaging results/findings within the past 24 hours.

## 2024-03-18 NOTE — PLAN OF CARE
[employer] auth pending, Orders, PPD  uploaded to [facility]. Bryce at [place] is coordinating with the family to get paperwork signed. Covid is still pending. 142 pending.    VANESSA Lewis, LORNA  [facility]  [id number]

## 2024-03-18 NOTE — ASSESSMENT & PLAN NOTE
Patient's FSGs are uncontrolled due to hyperglycemia on current medication regimen.  Last A1c reviewed-   Lab Results   Component Value Date    HGBA1C >14.0 (H) 03/05/2024     Most recent fingerstick glucose reviewed-   Recent Labs   Lab 03/17/24  2200 03/18/24  0204 03/18/24  0804 03/18/24  1212   POCTGLUCOSE 155* 248* 305* 145*     Current correctional scale   LD SSI  Maintain anti-hyperglycemic dose as follows-   Antihyperglycemics (From admission, onward)      Start     Stop Route Frequency Ordered    03/19/24 1130  insulin aspart U-100 pen 10 Units         -- SubQ with lunch 03/18/24 1316    03/19/24 0715  insulin aspart U-100 pen 10 Units         -- SubQ with breakfast 03/18/24 1316    03/18/24 0900  insulin detemir U-100 (Levemir) pen 30 Units         -- SubQ Daily 03/17/24 1758    03/17/24 2100  insulin detemir U-100 (Levemir) pen 15 Units         03/18/24 2059 SubQ Nightly 03/17/24 1806    03/11/24 1521  insulin aspart U-100 pen 0-5 Units         -- SubQ Before meals & nightly PRN 03/11/24 1520          Hold Oral hypoglycemics while patient is in the hospital.  Will keep patient on DKA pathway with insulin drip and fluid with protocol in place for switching to subcutaneous insulin as anion gap closes.      DKA  HHS  - Resolved  - Continue insulin regimen and BG checks TIDWM and QHS and 2am  - patient tends to become hypoglycemic around dinnertime  - could be insulin stacking from breakfast and lunch insulin aspart given poor PO intake despite assistance with feeding   - detemir switched from qhs to qam - may need BID  - aspart scheduled w/ only breakfast and lunch to avoid hypoglycemia  - consider endocrine consult if still not in range with current adjustments

## 2024-03-18 NOTE — NURSING
Patient's found to be hypoglycemic at meal time accucheck. POCT blood glucose 41. Patient provided with juice and food. Patient initially refused food, but relented after persistent encouragement. 125 mL bolus of D50 provided per MD order. Patient blood glucose reassessed; 85. Will continue to monitor, will continue with plan of care.

## 2024-03-18 NOTE — ASSESSMENT & PLAN NOTE
Chronic, uncontrolled. Latest blood pressure and vitals reviewed-     Temp:  [98.2 °F (36.8 °C)-99.3 °F (37.4 °C)]   Pulse:  [65-91]   Resp:  [17-22]   BP: (118-146)/(49-63)   SpO2:  [91 %-94 %] .   Home meds for hypertension were reviewed and noted below.   Hypertension Medications               furosemide (LASIX) 40 MG tablet Take 20 mg by mouth.    hydrALAZINE (APRESOLINE) 100 MG tablet Take 1 tablet (100 mg total) by mouth every 8 (eight) hours.    isosorbide dinitrate (ISORDIL) 10 MG tablet Take 1 tablet (10 mg total) by mouth 3 (three) times daily.    NIFEdipine (PROCARDIA-XL) 60 MG (OSM) 24 hr tablet Take 1 tablet (60 mg total) by mouth once daily.            While in the hospital, will manage blood pressure as follows; Adjust home antihypertensive regimen as follows- will keep nifedipine - increased to 90     Will utilize p.r.n. blood pressure medication only if patient's blood pressure greater than 180/110 and he develops symptoms such as worsening chest pain or shortness of breath.

## 2024-03-18 NOTE — ASSESSMENT & PLAN NOTE
Patient with acute kidney injury/acute renal failure likely due to pre-renal azotemia due to dehydration YNES is currently worsening. Baseline creatinine  1.1  - Labs reviewed- Renal function/electrolytes with Estimated Creatinine Clearance: 43 mL/min (A) (based on SCr of 1.9 mg/dL (H)). according to latest data. Monitor urine output and serial BMP and adjust therapy as needed. Avoid nephrotoxins and renally dose meds for GFR listed above.    Creatinine 2.1 on admit, baseline around 1.1  - Likely pre-renal from poor PO intake    Lab Results   Component Value Date    CREATININE 1.9 (H) 03/18/2024       Plan:   - giving 1 liter of LR  - Check urine lytes urine protein creatinine ratio if YNES does not improve w/ fluids  - Strict I&Os and daily weights   - Daily BMP  - Trend sCr  - Avoid nephrotoxic agents such as NSAIDs, ACEi, ARBs and IV radiocontrast.  - Renally dose meds to current GFR.  - Maintain MAP > 65.  - No indications for HD at this time.   - Maintain electrolytes at goal: Mg > 2, Phos > 3, K > 4.

## 2024-03-18 NOTE — PLAN OF CARE
Problem: Adult Inpatient Plan of Care  Goal: Plan of Care Review  Outcome: Ongoing, Progressing  Goal: Patient-Specific Goal (Individualized)  Outcome: Ongoing, Progressing  Goal: Absence of Hospital-Acquired Illness or Injury  Outcome: Ongoing, Progressing  Goal: Optimal Comfort and Wellbeing  Outcome: Ongoing, Progressing  Goal: Readiness for Transition of Care  Outcome: Ongoing, Progressing     Problem: Diabetes Comorbidity  Goal: Blood Glucose Level Within Targeted Range  Outcome: Ongoing, Progressing     Problem: Adjustment to Illness (Sepsis/Septic Shock)  Goal: Optimal Coping  Outcome: Ongoing, Progressing     Problem: Bleeding (Sepsis/Septic Shock)  Goal: Absence of Bleeding  Outcome: Ongoing, Progressing     Problem: Glycemic Control Impaired (Sepsis/Septic Shock)  Goal: Blood Glucose Level Within Desired Range  Outcome: Ongoing, Progressing     Problem: Infection Progression (Sepsis/Septic Shock)  Goal: Absence of Infection Signs and Symptoms  Outcome: Ongoing, Progressing     Problem: Nutrition Impaired (Sepsis/Septic Shock)  Goal: Optimal Nutrition Intake  Outcome: Ongoing, Progressing     Problem: Infection  Goal: Absence of Infection Signs and Symptoms  Outcome: Ongoing, Progressing     Problem: Impaired Wound Healing  Goal: Optimal Wound Healing  Outcome: Ongoing, Progressing     Problem: Skin Injury Risk Increased  Goal: Skin Health and Integrity  Outcome: Ongoing, Progressing     Problem: Fall Injury Risk  Goal: Absence of Fall and Fall-Related Injury  Outcome: Ongoing, Progressing     Problem: Fluid and Electrolyte Imbalance (Acute Kidney Injury/Impairment)  Goal: Fluid and Electrolyte Balance  Outcome: Ongoing, Progressing     Problem: Oral Intake Inadequate (Acute Kidney Injury/Impairment)  Goal: Optimal Nutrition Intake  Outcome: Ongoing, Progressing     Problem: Renal Function Impairment (Acute Kidney Injury/Impairment)  Goal: Effective Renal Function  Outcome: Ongoing, Progressing    Patient  remains free from falls and injury. NADN. VSS. Safety maintained; bed low and locked, call light in reach.  No complaint of pain, n/v, diarrhea, or SOB. Questions encouraged and answered. Plan of care reviewed with patient. Will continue to monitor, will continue with plan of care.

## 2024-03-19 PROBLEM — N40.0 BPH (BENIGN PROSTATIC HYPERPLASIA): Status: ACTIVE | Noted: 2024-03-19

## 2024-03-19 LAB
AMORPH CRY UR QL COMP ASSIST: ABNORMAL
ANION GAP SERPL CALC-SCNC: 8 MMOL/L (ref 8–16)
BACTERIA #/AREA URNS AUTO: ABNORMAL /HPF
BILIRUB UR QL STRIP: NEGATIVE
BUN SERPL-MCNC: 28 MG/DL (ref 8–23)
CALCIUM SERPL-MCNC: 8.5 MG/DL (ref 8.7–10.5)
CHLORIDE SERPL-SCNC: 105 MMOL/L (ref 95–110)
CLARITY UR REFRACT.AUTO: CLEAR
CO2 SERPL-SCNC: 28 MMOL/L (ref 23–29)
COLOR UR AUTO: YELLOW
CREAT SERPL-MCNC: 1.8 MG/DL (ref 0.5–1.4)
ERYTHROCYTE [DISTWIDTH] IN BLOOD BY AUTOMATED COUNT: 16.8 % (ref 11.5–14.5)
EST. GFR  (NO RACE VARIABLE): 38.8 ML/MIN/1.73 M^2
GLUCOSE SERPL-MCNC: 183 MG/DL (ref 70–110)
GLUCOSE UR QL STRIP: NEGATIVE
HCT VFR BLD AUTO: 27.3 % (ref 40–54)
HGB BLD-MCNC: 8.2 G/DL (ref 14–18)
HGB UR QL STRIP: ABNORMAL
HYALINE CASTS UR QL AUTO: 0 /LPF
KETONES UR QL STRIP: NEGATIVE
LACTATE SERPL-SCNC: 1.2 MMOL/L (ref 0.5–2.2)
LEUKOCYTE ESTERASE UR QL STRIP: NEGATIVE
MCH RBC QN AUTO: 24.4 PG (ref 27–31)
MCHC RBC AUTO-ENTMCNC: 30 G/DL (ref 32–36)
MCV RBC AUTO: 81 FL (ref 82–98)
MICROSCOPIC COMMENT: ABNORMAL
NITRITE UR QL STRIP: NEGATIVE
PH UR STRIP: 5 [PH] (ref 5–8)
PLATELET # BLD AUTO: 542 K/UL (ref 150–450)
PMV BLD AUTO: 8.7 FL (ref 9.2–12.9)
POCT GLUCOSE: 149 MG/DL (ref 70–110)
POCT GLUCOSE: 156 MG/DL (ref 70–110)
POCT GLUCOSE: 173 MG/DL (ref 70–110)
POCT GLUCOSE: 180 MG/DL (ref 70–110)
POCT GLUCOSE: 216 MG/DL (ref 70–110)
POCT GLUCOSE: 220 MG/DL (ref 70–110)
POTASSIUM SERPL-SCNC: 4.9 MMOL/L (ref 3.5–5.1)
PROT UR QL STRIP: ABNORMAL
RBC # BLD AUTO: 3.36 M/UL (ref 4.6–6.2)
RBC #/AREA URNS AUTO: 63 /HPF (ref 0–4)
SODIUM SERPL-SCNC: 141 MMOL/L (ref 136–145)
SP GR UR STRIP: 1.02 (ref 1–1.03)
SQUAMOUS #/AREA URNS AUTO: 2 /HPF
URN SPEC COLLECT METH UR: ABNORMAL
VORICONAZOLE SERPL-MCNC: 4.2 MCG/ML (ref 1–5.5)
WBC # BLD AUTO: 11.67 K/UL (ref 3.9–12.7)
WBC #/AREA URNS AUTO: 2 /HPF (ref 0–5)

## 2024-03-19 PROCEDURE — 87040 BLOOD CULTURE FOR BACTERIA: CPT

## 2024-03-19 PROCEDURE — A4216 STERILE WATER/SALINE, 10 ML: HCPCS

## 2024-03-19 PROCEDURE — 20600001 HC STEP DOWN PRIVATE ROOM

## 2024-03-19 PROCEDURE — 25000003 PHARM REV CODE 250: Performed by: PHYSICIAN ASSISTANT

## 2024-03-19 PROCEDURE — 63600175 PHARM REV CODE 636 W HCPCS: Performed by: PHYSICIAN ASSISTANT

## 2024-03-19 PROCEDURE — 25000003 PHARM REV CODE 250

## 2024-03-19 PROCEDURE — 83605 ASSAY OF LACTIC ACID: CPT | Performed by: STUDENT IN AN ORGANIZED HEALTH CARE EDUCATION/TRAINING PROGRAM

## 2024-03-19 PROCEDURE — 85027 COMPLETE CBC AUTOMATED: CPT | Performed by: STUDENT IN AN ORGANIZED HEALTH CARE EDUCATION/TRAINING PROGRAM

## 2024-03-19 PROCEDURE — 27000207 HC ISOLATION

## 2024-03-19 PROCEDURE — 36415 COLL VENOUS BLD VENIPUNCTURE: CPT | Performed by: STUDENT IN AN ORGANIZED HEALTH CARE EDUCATION/TRAINING PROGRAM

## 2024-03-19 PROCEDURE — 25000003 PHARM REV CODE 250: Performed by: INTERNAL MEDICINE

## 2024-03-19 PROCEDURE — 99222 1ST HOSP IP/OBS MODERATE 55: CPT | Mod: ,,, | Performed by: NURSE PRACTITIONER

## 2024-03-19 PROCEDURE — 81001 URINALYSIS AUTO W/SCOPE: CPT

## 2024-03-19 PROCEDURE — 99233 SBSQ HOSP IP/OBS HIGH 50: CPT | Mod: ,,, | Performed by: SURGERY

## 2024-03-19 PROCEDURE — 63600175 PHARM REV CODE 636 W HCPCS

## 2024-03-19 PROCEDURE — 99222 1ST HOSP IP/OBS MODERATE 55: CPT | Mod: GC,,, | Performed by: UROLOGY

## 2024-03-19 PROCEDURE — 63600175 PHARM REV CODE 636 W HCPCS: Performed by: NURSE PRACTITIONER

## 2024-03-19 PROCEDURE — 80048 BASIC METABOLIC PNL TOTAL CA: CPT | Performed by: STUDENT IN AN ORGANIZED HEALTH CARE EDUCATION/TRAINING PROGRAM

## 2024-03-19 RX ORDER — INSULIN ASPART 100 [IU]/ML
4 INJECTION, SOLUTION INTRAVENOUS; SUBCUTANEOUS
Status: DISCONTINUED | OUTPATIENT
Start: 2024-03-19 | End: 2024-03-20

## 2024-03-19 RX ADMIN — GABAPENTIN 300 MG: 300 CAPSULE ORAL at 08:03

## 2024-03-19 RX ADMIN — ATORVASTATIN CALCIUM 40 MG: 40 TABLET, FILM COATED ORAL at 08:03

## 2024-03-19 RX ADMIN — POLYETHYLENE GLYCOL 3350 17 G: 17 POWDER, FOR SOLUTION ORAL at 08:03

## 2024-03-19 RX ADMIN — INSULIN ASPART 4 UNITS: 100 INJECTION, SOLUTION INTRAVENOUS; SUBCUTANEOUS at 06:03

## 2024-03-19 RX ADMIN — VORICONAZOLE 200 MG: 200 TABLET, FILM COATED ORAL at 08:03

## 2024-03-19 RX ADMIN — Medication 10 ML: at 06:03

## 2024-03-19 RX ADMIN — Medication 10 ML: at 12:03

## 2024-03-19 RX ADMIN — APIXABAN 10 MG: 5 TABLET, FILM COATED ORAL at 08:03

## 2024-03-19 RX ADMIN — CLOPIDOGREL BISULFATE 75 MG: 75 TABLET ORAL at 08:03

## 2024-03-19 RX ADMIN — SENNOSIDES 8.6 MG: 8.6 TABLET, FILM COATED ORAL at 08:03

## 2024-03-19 RX ADMIN — PANTOPRAZOLE SODIUM 40 MG: 40 TABLET, DELAYED RELEASE ORAL at 08:03

## 2024-03-19 RX ADMIN — Medication 10 ML: at 11:03

## 2024-03-19 RX ADMIN — ERTAPENEM 1 G: 1 INJECTION INTRAMUSCULAR; INTRAVENOUS at 11:03

## 2024-03-19 RX ADMIN — TAMSULOSIN HYDROCHLORIDE 0.4 MG: 0.4 CAPSULE ORAL at 08:03

## 2024-03-19 RX ADMIN — ACETAMINOPHEN 650 MG: 325 TABLET ORAL at 01:03

## 2024-03-19 RX ADMIN — ACETAMINOPHEN 650 MG: 325 TABLET ORAL at 08:03

## 2024-03-19 RX ADMIN — NIFEDIPINE 90 MG: 30 TABLET, FILM COATED, EXTENDED RELEASE ORAL at 08:03

## 2024-03-19 RX ADMIN — INSULIN ASPART 5 UNITS: 100 INJECTION, SOLUTION INTRAVENOUS; SUBCUTANEOUS at 01:03

## 2024-03-19 RX ADMIN — INSULIN ASPART 2 UNITS: 100 INJECTION, SOLUTION INTRAVENOUS; SUBCUTANEOUS at 08:03

## 2024-03-19 RX ADMIN — INSULIN ASPART 5 UNITS: 100 INJECTION, SOLUTION INTRAVENOUS; SUBCUTANEOUS at 08:03

## 2024-03-19 NOTE — ASSESSMENT & PLAN NOTE
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 Patient febrile today and WBC 11.67. Xray L foot shows acute OM to calcaneus. Patient was previously not amendable to AKA but has now agreed to continue with amputation.    - Plan for AKA tomorrow 3/19  - Will update with timing pending OR schedule  - NPO at midnight in anticipation for OR  - All other care per primary  - Vascular surgery will continue to follow

## 2024-03-19 NOTE — HPI
Reason for Consult: Management of T2DM, Hyperglycemia     Diabetes diagnosis year: > 25 years    Home Diabetes Medications:  Novolog 6 units w/ meals and Lantus 45 units nightly (states he is not taking).     How often checking glucose at home?  Not really checking    BG readings on regimen: 200's  Hypoglycemia on the regimen?  Yes (When he was taking the Lantus)  Missed doses on regimen?  Yes    Diabetes Complications include:     Hyperglycemia, Hypoglycemia , Diabetic chronic kidney disease     , Diabetic dermatitis, Foot ulcer  , and Periodontal disease    Complicating diabetes co morbidities:   HTN; HLD      HPI: Saji Castañeda is a 75 yo M with a history of Afib, T2D, HLD, HTN, chronic osteomyelitis of L heel, s/p L TMA, PID, ESBL Klebsiella and Acinetobacter bacteremia who was admitted after being found down at home and DKA. Endocrine consulted to manage hyperglycemia and type 2 diabetes.   Hemoglobin A1C   Date Value Ref Range Status   03/05/2024 >14.0 (H) 4.0 - 5.6 % Final     Comment:     ADA Screening Guidelines:  5.7-6.4%  Consistent with prediabetes  >or=6.5%  Consistent with diabetes    High levels of fetal hemoglobin interfere with the HbA1C  assay. Heterozygous hemoglobin variants (HbS, HgC, etc)do  not significantly interfere with this assay.   However, presence of multiple variants may affect accuracy.     09/13/2023 11.0 (H) 4.5 - 5.7 % Final   03/10/2023 8.4 (H) 4.0 - 5.6 % Final     Comment:     ADA Screening Guidelines:  5.7-6.4%  Consistent with prediabetes  >or=6.5%  Consistent with diabetes    High levels of fetal hemoglobin interfere with the HbA1C  assay. Heterozygous hemoglobin variants (HbS, HgC, etc)do  not significantly interfere with this assay.   However, presence of multiple variants may affect accuracy.     09/17/2022 9.9 (H) 4.0 - 5.6 % Final     Comment:     ADA Screening Guidelines:  5.7-6.4%  Consistent with prediabetes  >or=6.5%  Consistent with diabetes    High levels of fetal  hemoglobin interfere with the HbA1C  assay. Heterozygous hemoglobin variants (HbS, HgC, etc)do  not significantly interfere with this assay.   However, presence of multiple variants may affect accuracy.

## 2024-03-19 NOTE — PLAN OF CARE
Problem: Adult Inpatient Plan of Care  Goal: Plan of Care Review  Outcome: Ongoing, Progressing  Goal: Patient-Specific Goal (Individualized)  Outcome: Ongoing, Not Progressing  Goal: Absence of Hospital-Acquired Illness or Injury  Outcome: Ongoing, Progressing  Goal: Optimal Comfort and Wellbeing  Outcome: Ongoing, Not Progressing  Goal: Readiness for Transition of Care  Outcome: Ongoing, Not Progressing     Problem: Adjustment to Illness (Sepsis/Septic Shock)  Goal: Optimal Coping  Outcome: Ongoing, Not Progressing     Problem: Diabetes Comorbidity  Goal: Blood Glucose Level Within Targeted Range  Outcome: Ongoing, Progressing     Problem: Glycemic Control Impaired (Sepsis/Septic Shock)  Goal: Blood Glucose Level Within Desired Range  Outcome: Ongoing, Progressing     Problem: Infection Progression (Sepsis/Septic Shock)  Goal: Absence of Infection Signs and Symptoms  Outcome: Ongoing, Progressing     Problem: Nutrition Impaired (Sepsis/Septic Shock)  Goal: Optimal Nutrition Intake  Outcome: Ongoing, Not Progressing     Problem: Skin Injury Risk Increased  Goal: Skin Health and Integrity  Outcome: Ongoing, Progressing     Problem: Impaired Wound Healing  Goal: Optimal Wound Healing  Outcome: Ongoing, Progressing     Problem: Fall Injury Risk  Goal: Absence of Fall and Fall-Related Injury  Outcome: Ongoing, Progressing     Problem: Oral Intake Inadequate (Acute Kidney Injury/Impairment)  Goal: Optimal Nutrition Intake  Outcome: Ongoing, Progressing     Problem: Renal Function Impairment (Acute Kidney Injury/Impairment)  Goal: Effective Renal Function  Outcome: Ongoing, Progressing     Pt is currently in bed all safety measures are in place, vs stable, and informed to call prior to attempting to ambulate. Will continue to monitor for any changes in care.

## 2024-03-19 NOTE — PROGRESS NOTES
[facility] - Telemetry [facility] Medicine  Progress Note    Patient Name: Saji Castañeda  MRN: [id number]  Patient Class: IP- Inpatient   Admission Date: 3/5/2024  Length of Stay: 14 days  Attending Physician: Jm Rosa MD  Primary Care Provider: [facility] -        Subjective:     Principal Problem:Osteomyelitis of left lower extremity        HPI:  Saji Castañeda is a 74 y.o. male with a medical history of DM2, HLD, HTN, chronic osteomyelitis of L heel, L TMA, PAD, hx of ESBL klebsiella, acinetobacter bacteremia, presenting with hyperglycemia. He presented to the ED via EMS and his POCT glucose on arrival was >500. Serum blood glucose 667 with anion gap of 17 and elevated ketones. Serum potassium 3.4, corrected sodium 149. Urinalysis significant for RBCs, glucosuria, and moderate bacteria and yeast. CBC revealed leukocytosis, elevated platelets, and mild anemia. CMP shows Na 135, K 3.4, BUN 29, Cr 2.1, Glucose 677, Lactate 4.61. BNP and troponin elevated. GFR 32.2. Insulin drip, IV fluids, zosyn, vancomycin, and potassium given.      He is unable to provide much history, but states that he has not been taking his home medications for at least a week. He reports shortness of breath, fatigue, and weakness for the last 6-7 days. He has chills and reports episodes of emesis. He denies abdominal pain, chest pain, palpitations, recent illness/infection, fevers, changes to bowel movements and urinary output. Patient lives with his brother and sister at home. He was last seen in the ED on 2/21 after a fall. He was hypoglycemic BGL 60 at that time which returned to normal after eating. He was also scheduled for a wound clinic appointment today 3/5 at [facility] with Dr. Wilson.        Overview/Hospital Course:  Wound care and cultures collected by Podiatry with recs for Vascular consult for amputation. Patient declined amputation. ID recommending IV Ertapenem for chronic osteomyelitis for 6 week duration  (EED: 4/15/24). With high wound care needs and IV antibiotics plan patient needing long-term placement. He is being treated with topical permethrin for bedbugs. Wound care has been dressing the wounds. 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 Still adjusting insulin to control sugars better. Giving fluids for YNES. Poor PO intake even with feeding assistance. LTAC authorization denied by [employer]. Waiting for SNF placement ([facility], awaiting auth and family to complete paperwork). Patient started spiking fever again and infectious work up done. ID re-consulted. Patient has been counseled multiple times that source control cannot be achieved without amputation. Patient still is not agreeable.       Interval History: Overnight patient ran a fever of 101.6 and glucose dropped to 43 needed D10 and sugar came back up to 85.         Objective:     Vital Signs (Most Recent):  Temp: (!) 101.7 °F (38.7 °C) (03/19/24 1145)  Pulse: 81 (03/19/24 1145)  Resp: (!) 24 (03/19/24 1145)  BP: (!) 138/57 (03/19/24 1145)  SpO2: 100 % (03/19/24 1145) Vital Signs (24h Range):  Temp:  [98.1 °F (36.7 °C)-101.7 °F (38.7 °C)] 101.7 °F (38.7 °C)  Pulse:  [66-91] 81  Resp:  [16-24] 24  SpO2:  [86 %-100 %] 100 %  BP: (116-138)/(44-70) 138/57     Weight: 120.2 kg (264 lb 15.9 oz)  Body mass index is 39.13 kg/m².    Intake/Output Summary (Last 24 hours) at 3/19/2024 1214  Last data filed at 3/19/2024 1004  Gross per 24 hour   Intake --   Output 150 ml   Net -150 ml         Physical Exam      Constitutional:       General: He is not in acute distress.     Appearance: Normal appearance. He is well-developed. He is ill-appearing . He is not diaphoretic.   HENT:      Head: Normocephalic and atraumatic.      Right Ear: External ear normal.      Left Ear: External ear normal.      Nose: Nose normal.   Eyes:      General: No scleral icterus.        Right eye: No discharge.         Left eye: No  discharge.      Conjunctiva/sclera: Conjunctivae normal.   Pulmonary:      Effort: Pulmonary effort is normal. No respiratory distress.      Breath sounds: No stridor.   Skin:     General: Skin is dry.      Comments: Wounds of legs, back, buttocks   Neurological:      General: No focal deficit present.      Mental Status: He is alert and oriented to person, place, and time. Mental status is at baseline.   Psychiatric:         Mood and Affect: Mood normal.         Behavior: Behavior normal.         Thought Content: Thought content normal.         Judgment: Judgment normal.   Significant Labs: All pertinent labs within the past 24 hours have been reviewed.    Significant Imaging: I have reviewed all pertinent imaging results/findings within the past 24 hours.    Assessment/Plan:      * Osteomyelitis of left lower extremity  Patient with Proteus and enterobacter on leg cultures collected by Podiatry with concern for acute on chronic osteo. Fungal culture grew fusarium. He has started spiking fever and still is not agreeable to amputation, will do infectious work up to look out for source.    -UA reflex to culture  -CXR   -Blood culture  -Inpatient consult to ID and vascular surgery.  - IV ertapenem with end date 4/15/24   - Added voriconazole for fusarium coverage which grew on fungal culture - 3 months  - midline catheters placed on admission for difficult IV access  -Still thinking about the amputation recommended by vascular surgery - has been given enough time at this point and demonstrates poor understanding, conversations about amputation have been unproductive for the past week     Localized swelling of right upper extremity  Patient has swelling and pain in the right arm and some swelling in the left arm as well    -US b/l upper extremity for DVT showed clot, increased his eliquis from 5 mg to 10 mg and removed his central line.       History of Bedbug bite with infection  Patient started on Permethrin daily  "for 5 days starting 3/12      Proteus infection    On ertapenem     Chronic anticoagulation  - See DVT      History of amputation of left high mid foot   See osteo      Stage 3b chronic kidney disease  Creatine stable for now. BMP reviewed- noted Estimated Creatinine Clearance: 45.4 mL/min (A) (based on SCr of 1.8 mg/dL (H)). according to latest data. Based on current GFR, CKD stage is stage 3 - GFR 30-59.  Monitor UOP and serial BMP and adjust therapy as needed. Renally dose meds. Avoid nephrotoxic medications and procedures.    Severe sepsis  This patient does have evidence of infective focus  My overall impression is sepsis.  Source: Skin and Soft Tissue (location left leg)  Antibiotics given-   Antibiotics (72h ago, onward)      Start     Stop Route Frequency Ordered    03/08/24 1230  ertapenem (INVANZ) 1 g in sodium chloride 0.9 % 100 mL IVPB (MB+)         -- IV Every 24 hours (non-standard times) 03/08/24 1115          Latest lactate reviewed-  No results for input(s): "LACTATE", "POCLAC" in the last 72 hours.    Organ dysfunction indicated by Acute kidney injury    Fluid challenge Actual Body weight- Patient will receive 30ml/kg actual body weight to calculate fluid bolus for treatment of septic shock.     Post- resuscitation assessment No - Post resuscitation assessment not needed       Will Not start Pressors-     - See osteo      Other hyperlipidemia  Continue home atorvastatin      Diabetic ketoacidosis without coma associated with type 2 diabetes mellitus  Patient's FSGs are uncontrolled due to hyperglycemia on current medication regimen.  Last A1c reviewed-   Lab Results   Component Value Date    HGBA1C >14.0 (H) 03/05/2024     Most recent fingerstick glucose reviewed-   Recent Labs   Lab 03/19/24  0226 03/19/24  0806 03/19/24  0843 03/19/24  1218   POCTGLUCOSE 156* 220* 216* 173*       Current correctional scale   LD SSI  Maintain anti-hyperglycemic dose as follows-   Antihyperglycemics (From " admission, onward)      Start     Stop Route Frequency Ordered    03/19/24 1645  insulin aspart U-100 pen 4 Units         -- SubQ with dinner 03/19/24 0856    03/19/24 1130  insulin aspart U-100 pen 5 Units         -- SubQ with lunch 03/18/24 1910    03/19/24 0900  insulin detemir U-100 (Levemir) pen 12 Units         -- SubQ 2 times daily 03/18/24 2143    03/19/24 0715  insulin aspart U-100 pen 5 Units         -- SubQ with breakfast 03/18/24 1910 03/11/24 1521  insulin aspart U-100 pen 0-5 Units         -- SubQ Before meals & nightly PRN 03/11/24 1520          Hold Oral hypoglycemics while patient is in the hospital.  Will keep patient on DKA pathway with insulin drip and fluid with protocol in place for switching to subcutaneous insulin as anion gap closes.      DKA  HHS  - Resolved  - Continue insulin regimen and BG checks TIDWM and QHS and 2am  - patient tends to become hypoglycemic around dinnertime  - could be insulin stacking from breakfast and lunch insulin aspart given poor PO intake despite assistance with feeding   - detemir switched from qhs to qam - may need BID  - aspart scheduled w/ only breakfast and lunch to avoid hypoglycemia  - consider endocrine consult if still not in range with current adjustments  -Inpatient consult to Endocrine    Paroxysmal atrial fibrillation  Patient with Persistent (7 days or more) atrial fibrillation which is uncontrolled currently with    . Patient is currently in sinus rhythm.[id number] Score: 4. . Anticoagulation indicated. Anticoagulation done with lovenox as we could not get IV access for heparin  .    -apixaban 5 mg after finishing loading 10 mg eliquis for DVT    Chronic deep vein thrombosis (DVT) of right upper extremity  - eliquis loading dose following by maintenance       Cellulitis of left lower leg  See osteo      Peripheral arterial disease  Resume plavix      YNES (acute kidney injury)  Patient with acute kidney injury/acute renal failure likely due to  pre-renal azotemia due to dehydration YNES is currently worsening. Baseline creatinine  1.1  - Labs reviewed- Renal function/electrolytes with Estimated Creatinine Clearance: 45.4 mL/min (A) (based on SCr of 1.8 mg/dL (H)). according to latest data. Monitor urine output and serial BMP and adjust therapy as needed. Avoid nephrotoxins and renally dose meds for GFR listed above.    Creatinine 2.1 on admit, baseline around 1.1  - Likely pre-renal from poor PO intake    Lab Results   Component Value Date    CREATININE 1.8 (H) 03/19/2024       Plan:   - giving 1 liter of LR  - Check urine lytes urine protein creatinine ratio if YNES does not improve w/ fluids  - Strict I&Os and daily weights   - Daily BMP  - Trend sCr  - Avoid nephrotoxic agents such as NSAIDs, ACEi, ARBs and IV radiocontrast.  - Renally dose meds to current GFR.  - Maintain MAP > 65.  - No indications for HD at this time.   - Maintain electrolytes at goal: Mg > 2, Phos > 3, K > 4.         Essential hypertension  Chronic, uncontrolled. Latest blood pressure and vitals reviewed-     Temp:  [98.1 °F (36.7 °C)-101.7 °F (38.7 °C)]   Pulse:  [66-91]   Resp:  [16-24]   BP: (116-138)/(44-70)   SpO2:  [86 %-100 %] .   Home meds for hypertension were reviewed and noted below.   Hypertension Medications               furosemide (LASIX) 40 MG tablet Take 20 mg by mouth.    hydrALAZINE (APRESOLINE) 100 MG tablet Take 1 tablet (100 mg total) by mouth every 8 (eight) hours.    isosorbide dinitrate (ISORDIL) 10 MG tablet Take 1 tablet (10 mg total) by mouth 3 (three) times daily.    NIFEdipine (PROCARDIA-XL) 60 MG (OSM) 24 hr tablet Take 1 tablet (60 mg total) by mouth once daily.            While in the hospital, will manage blood pressure as follows; Adjust home antihypertensive regimen as follows- will keep nifedipine - increased to 90     Will utilize p.r.n. blood pressure medication only if patient's blood pressure greater than 180/110 and he develops symptoms such  as worsening chest pain or shortness of breath.      VTE Risk Mitigation (From admission, onward)           Ordered     apixaban tablet 5 mg  2 times daily        See Hyperspace for full Linked Orders Report.    03/15/24 0954     apixaban tablet 10 mg  2 times daily        See Hyperspace for full Linked Orders Report.    03/15/24 0954                    Discharge Planning   OXANA: 3/19/2024     Code Status: Full Code   Is the patient medically ready for discharge?: No    Reason for patient still in hospital (select all that apply): Treatment  Discharge Plan A: Skilled Nursing Facility   Discharge Delays: None known at this time              Lynette Perkins MD  Department of Hospital Medicine   [facility] - Telemetry Stepdown

## 2024-03-19 NOTE — PROGRESS NOTES
[facility] - Telemetry Stepdown  Wound Care    Patient Name:  Saji Castañeda   MRN:  [id number]  Date: 3/19/2024  Diagnosis: Osteomyelitis of left lower extremity    History:     Past Medical History:   Diagnosis Date    Arthritis     legs    Bacteremia due to Gram-negative bacteria 3/23/2021    Diabetes mellitus     Diabetes mellitus, type 2     Hyperlipidemia     Hypertension     Osteomyelitis     Palliative care encounter 5/24/2023       Social History     Socioeconomic History    Marital status: Single   Tobacco Use    Smoking status: Never    Smokeless tobacco: Never   Substance and Sexual Activity    Alcohol use: No     Comment: occassional    Drug use: No    Sexual activity: Not Currently   Social History Narrative    Not currently working; lives with family     Social Determinants of Health     Financial Resource Strain: Medium Risk (3/5/2024)    Overall Financial Resource Strain (CARDIA)     Difficulty of Paying Living Expenses: Somewhat hard   Food Insecurity: No Food Insecurity (3/5/2024)    Hunger Vital Sign     Worried About Running Out of Food in the Last Year: Never true     Ran Out of Food in the Last Year: Never true   Transportation Needs: No Transportation Needs (3/5/2024)    PRAPARE - Transportation     Lack of Transportation (Medical): No     Lack of Transportation (Non-Medical): No   Physical Activity: Inactive (3/5/2024)    Exercise Vital Sign     Days of Exercise per Week: 0 days     Minutes of Exercise per Session: 0 min   Stress: No Stress Concern Present (3/5/2024)    [employer] - Occupational Stress Questionnaire     Feeling of Stress : Only a little   Social Connections: Socially Isolated (3/5/2024)    Social Connection and Isolation Panel [NHANES]     Frequency of Communication with Friends and Family: More than three times a week     Frequency of Social Gatherings with Friends and Family: Patient declined     Attends Christian Services: Never     Active Member  of Clubs or Organizations: No     Attends Club or Organization Meetings: Never     Marital Status: Never    Housing Stability: Low Risk  (3/5/2024)    Housing Stability Vital Sign     Unable to Pay for Housing in the Last Year: No     Number of Places Lived in the Last Year: 1     Unstable Housing in the Last Year: No       Precautions:     Allergies as of 03/05/2024    (No Known Allergies)       WO Assessment Details/Treatment     Patient seen for wound care consultation.   Reviewed chart for this encounter.   See Flow Sheet for findings.      RECOMMENDATIONS: Patient is currently being seen by inpatient wound care for left hip and buttocks wounds. Buttocks wounds progressing, continue current wound care orders.    Right back/shoulder wound has resolved.    Left hip wound noted to have slough at base of wound. Cleanse with vashe for antimicrobial properties and pat dry. Apply santyl to base of wound for autolytic debridement. Cover with mepilex foam border dressing. Change daily or PRN if soiled.    Patient is currently on immerse bed for microclimate and pressure redistribution. No other issues or concerns at this time. Podiatry following for lower extremity wounds. Will follow up 3/26/2024 or sooner if needed.    Discussed POC with patient and primary nurse.   See EMR for orders & patient education.    Discussed nutrition and the role of protein in wound healing with the patient. Instructed patient to optimize protein for wound healing.    Bedside nursing to continue care & monitoring.  Bedside nursing to maintain pressure injury prevention interventions.            03/19/24 0900   WOCN Assessment   WOCN Total Time (mins) 45   Visit Date 03/19/24   Visit Time 0900   Consult Type Follow Up   WOCN Speciality Wound   Intervention assessed;changed;applied;chart review;coordination of care;orders   Teaching on-going   Skin Interventions   Pressure Reduction Techniques frequent weight shift encouraged;weight  shift assistance provided        Altered Skin Integrity 03/05/24 0949 Left Buttocks #6 Ulceration Partial thickness tissue loss. Shallow open ulcer with a red or pink wound bed, without slough. Intact or Open/Ruptured Serum-filled blister.   Date First Assessed/Time First Assessed: 03/05/24 0949   Altered Skin Integrity Present on Admission - Did Patient arrive to the hospital with altered skin?: yes  Side: Left  Location: Buttocks  Wound Number: #6  Primary Wound Type: Ulceration  Descri...   Wound Image    Dressing Appearance Open to air   Drainage Amount Scant   Drainage Characteristics/Odor Serosanguineous   Appearance Pink;Red;Black;Tan   Care Cleansed with:;Antimicrobial agent   Dressing Change Due 03/19/24        Altered Skin Integrity 03/06/24 1000 Left dorsal Greater trochanter   Date First Assessed/Time First Assessed: 03/06/24 1000   Altered Skin Integrity Present on Admission - Did Patient arrive to the hospital with altered skin?: yes  Side: Left  Orientation: dorsal  Location: Greater trochanter  Is this injury device rel...   Wound Image    Dressing Appearance Dried drainage;Moist drainage   Drainage Amount Scant   Drainage Characteristics/Odor Serosanguineous   Appearance Pink;Red;Tan   Care Cleansed with:;Antimicrobial agent       Orders placed.   Trip Yarbrough RN  03/19/2024

## 2024-03-19 NOTE — ASSESSMENT & PLAN NOTE
Chronic, uncontrolled. Latest blood pressure and vitals reviewed-     Temp:  [98.1 °F (36.7 °C)-101.7 °F (38.7 °C)]   Pulse:  [66-91]   Resp:  [16-24]   BP: (116-138)/(44-70)   SpO2:  [86 %-100 %] .   Home meds for hypertension were reviewed and noted below.   Hypertension Medications               furosemide (LASIX) 40 MG tablet Take 20 mg by mouth.    hydrALAZINE (APRESOLINE) 100 MG tablet Take 1 tablet (100 mg total) by mouth every 8 (eight) hours.    isosorbide dinitrate (ISORDIL) 10 MG tablet Take 1 tablet (10 mg total) by mouth 3 (three) times daily.    NIFEdipine (PROCARDIA-XL) 60 MG (OSM) 24 hr tablet Take 1 tablet (60 mg total) by mouth once daily.            While in the hospital, will manage blood pressure as follows; Adjust home antihypertensive regimen as follows- will keep nifedipine - increased to 90     Will utilize p.r.n. blood pressure medication only if patient's blood pressure greater than 180/110 and he develops symptoms such as worsening chest pain or shortness of breath.

## 2024-03-19 NOTE — CLINICAL REVIEW
IP Sepsis Screen (most recent)       Sepsis Screen (IP) - 03/19/24 0919       Is the patient's history or complaint suggestive of a possible infection? Yes  -CB    Are there at least two of the following signs and symptoms present? Yes  -CB    Sepsis signs/symptoms - Hyper or Hypothermia Hyperthermia >100.4 or Hypothermia < 96.8  -CB    Sepsis signs/symptoms - Tachycardia Tachycardia     >90  -CB    Sepsis signs/symptoms - Tachypnea Tachypnea     >20  -CB    Are any of the following organ dysfunction criteria present and not considered to be due to a chronic condition? Yes   ckd -CB    Organ Dysfunction Criteria - O2 O2 Saturation < 95% on room air  -CB    Initiate Sepsis Protocol No  -CB    Reason sepsis not considered Pt. receiving appropriate management  -CB              User Key  (r) = Recorded By, (t) = Taken By, (c) = Cosigned By      Initials Name    Frances Odom, RN

## 2024-03-19 NOTE — PLAN OF CARE
Patient in bed, no complaints voiced at this time, BG within defined limits overnight, safety measures in place, call light in reach, NADN, POC ongoing    Problem: Adult Inpatient Plan of Care  Goal: Plan of Care Review  Outcome: Ongoing, Progressing  Goal: Patient-Specific Goal (Individualized)  Outcome: Ongoing, Progressing  Goal: Absence of Hospital-Acquired Illness or Injury  Outcome: Ongoing, Progressing  Goal: Optimal Comfort and Wellbeing  Outcome: Ongoing, Progressing  Goal: Readiness for Transition of Care  Outcome: Ongoing, Progressing     Problem: Diabetes Comorbidity  Goal: Blood Glucose Level Within Targeted Range  Outcome: Ongoing, Progressing     Problem: Adjustment to Illness (Sepsis/Septic Shock)  Goal: Optimal Coping  Outcome: Ongoing, Progressing     Problem: Bleeding (Sepsis/Septic Shock)  Goal: Absence of Bleeding  Outcome: Ongoing, Progressing     Problem: Glycemic Control Impaired (Sepsis/Septic Shock)  Goal: Blood Glucose Level Within Desired Range  Outcome: Ongoing, Progressing     Problem: Infection Progression (Sepsis/Septic Shock)  Goal: Absence of Infection Signs and Symptoms  Outcome: Ongoing, Progressing     Problem: Nutrition Impaired (Sepsis/Septic Shock)  Goal: Optimal Nutrition Intake  Outcome: Ongoing, Progressing     Problem: Infection  Goal: Absence of Infection Signs and Symptoms  Outcome: Ongoing, Progressing     Problem: Impaired Wound Healing  Goal: Optimal Wound Healing  Outcome: Ongoing, Progressing     Problem: Skin Injury Risk Increased  Goal: Skin Health and Integrity  Outcome: Ongoing, Progressing     Problem: Fall Injury Risk  Goal: Absence of Fall and Fall-Related Injury  Outcome: Ongoing, Progressing     Problem: Fluid and Electrolyte Imbalance (Acute Kidney Injury/Impairment)  Goal: Fluid and Electrolyte Balance  Outcome: Ongoing, Progressing     Problem: Oral Intake Inadequate (Acute Kidney Injury/Impairment)  Goal: Optimal Nutrition Intake  Outcome: Ongoing,  Progressing     Problem: Renal Function Impairment (Acute Kidney Injury/Impairment)  Goal: Effective Renal Function  Outcome: Ongoing, Progressing

## 2024-03-19 NOTE — ASSESSMENT & PLAN NOTE
Patient's FSGs are uncontrolled due to hyperglycemia on current medication regimen.  Last A1c reviewed-   Lab Results   Component Value Date    HGBA1C >14.0 (H) 03/05/2024     Most recent fingerstick glucose reviewed-   Recent Labs   Lab 03/19/24  0226 03/19/24  0806 03/19/24  0843 03/19/24  1218   POCTGLUCOSE 156* 220* 216* 173*       Current correctional scale   LD SSI  Maintain anti-hyperglycemic dose as follows-   Antihyperglycemics (From admission, onward)      Start     Stop Route Frequency Ordered    03/19/24 1645  insulin aspart U-100 pen 4 Units         -- SubQ with dinner 03/19/24 0856    03/19/24 1130  insulin aspart U-100 pen 5 Units         -- SubQ with lunch 03/18/24 1910    03/19/24 0900  insulin detemir U-100 (Levemir) pen 12 Units         -- SubQ 2 times daily 03/18/24 2143    03/19/24 0715  insulin aspart U-100 pen 5 Units         -- SubQ with breakfast 03/18/24 1910    03/11/24 1521  insulin aspart U-100 pen 0-5 Units         -- SubQ Before meals & nightly PRN 03/11/24 1520          Hold Oral hypoglycemics while patient is in the hospital.  Will keep patient on DKA pathway with insulin drip and fluid with protocol in place for switching to subcutaneous insulin as anion gap closes.      DKA  HHS  - Resolved  - Continue insulin regimen and BG checks TIDWM and QHS and 2am  - patient tends to become hypoglycemic around dinnertime  - could be insulin stacking from breakfast and lunch insulin aspart given poor PO intake despite assistance with feeding   - detemir switched from qhs to qam - may need BID  - aspart scheduled w/ only breakfast and lunch to avoid hypoglycemia  - consider endocrine consult if still not in range with current adjustments  -Inpatient consult to Endocrine

## 2024-03-19 NOTE — SUBJECTIVE & OBJECTIVE
Interval History: Overnight patient ran a fever of 101.6 and glucose dropped to 43 needed D10 and sugar came back up to 85.         Objective:     Vital Signs (Most Recent):  Temp: (!) 101.7 °F (38.7 °C) (03/19/24 1145)  Pulse: 81 (03/19/24 1145)  Resp: (!) 24 (03/19/24 1145)  BP: (!) 138/57 (03/19/24 1145)  SpO2: 100 % (03/19/24 1145) Vital Signs (24h Range):  Temp:  [98.1 °F (36.7 °C)-101.7 °F (38.7 °C)] 101.7 °F (38.7 °C)  Pulse:  [66-91] 81  Resp:  [16-24] 24  SpO2:  [86 %-100 %] 100 %  BP: (116-138)/(44-70) 138/57     Weight: 120.2 kg (264 lb 15.9 oz)  Body mass index is 39.13 kg/m².    Intake/Output Summary (Last 24 hours) at 3/19/2024 1214  Last data filed at 3/19/2024 1004  Gross per 24 hour   Intake --   Output 150 ml   Net -150 ml         Physical Exam      Constitutional:       General: He is not in acute distress.     Appearance: Normal appearance. He is well-developed. He is ill-appearing . He is not diaphoretic.   HENT:      Head: Normocephalic and atraumatic.      Right Ear: External ear normal.      Left Ear: External ear normal.      Nose: Nose normal.   Eyes:      General: No scleral icterus.        Right eye: No discharge.         Left eye: No discharge.      Conjunctiva/sclera: Conjunctivae normal.   Pulmonary:      Effort: Pulmonary effort is normal. No respiratory distress.      Breath sounds: No stridor.   Skin:     General: Skin is dry.      Comments: Wounds of legs, back, buttocks   Neurological:      General: No focal deficit present.      Mental Status: He is alert and oriented to person, place, and time. Mental status is at baseline.   Psychiatric:         Mood and Affect: Mood normal.         Behavior: Behavior normal.         Thought Content: Thought content normal.         Judgment: Judgment normal.   Significant Labs: All pertinent labs within the past 24 hours have been reviewed.    Significant Imaging: I have reviewed all pertinent imaging results/findings within the past 24  hours.

## 2024-03-19 NOTE — CONSULTS
[facility] - Telemetry Stepdown  Urology  Consult Note    Patient Name: Saji Castañeda  MRN: [id number]  Admission Date: 3/5/2024  Hospital Length of Stay: 14   Code Status: Full Code   Attending Provider: Jm Rosa MD   Consulting Provider: Marisol Najera MD  Primary Care Physician: [facility] -  Principal Problem:Osteomyelitis of left lower extremity    Inpatient consult to Urology  Consult performed by: Marisol Najera MD  Consult ordered by: Dione White MD  Reason for consult: urinary retention, difficult fung          Subjective:     HPI:  Saji Castañeda is a 75 yo M with a history of Afib, T2D, HLD, HTN, chronic osteomyelitis of L heel, s/p L TMA, PID, ESBL Klebsiella and Acinetobacter bacteremia who was admitted after being found down at home and DKA. Urology consulted for urinary retention and difficult fung placement.      Patient endorses mild suprapubic pain.  No prior episodes of retention.  He was previously followed for his BPH by Dr. Watson. PVR in 2022 office visit was 237.       Nursing reports bladder scan of 1300 mL.  Attempts by nursing to place catheter were unsuccessful due to meeting resistance.  No recent abdominal imaging.  Creatinine 1.8 from baseline 1.3-1.7.  No recent urine studies.  Last A1c > 14.  Renal US from October 2023 with no hydronephrosis bilaterally.     Twenty-two Chadian coude catheter was placed at bedside with some resistance at the prostate.  There was 1100 mL of clear yellow urine output.    Past Medical History:   Diagnosis Date    Arthritis     legs    Bacteremia due to Gram-negative bacteria 3/23/2021    Diabetes mellitus     Diabetes mellitus, type 2     Hyperlipidemia     Hypertension     Osteomyelitis     Palliative care encounter 5/24/2023       Past Surgical History:   Procedure Laterality Date    ANGIOGRAPHY OF LOWER EXTREMITY N/A 2/3/2021    Procedure: Angiogram Extremity Bilateral;  Surgeon: Ernst Chacko MD;  Location: [facility] OR [address] [place] [address];   Service: Peripheral Vascular;  Laterality: N/A;  7.4 mintues fluoroscopy time  816.15 mGy  170.17 Gycm2    AORTOGRAPHY WITH EXTREMITY RUNOFF Bilateral 2/3/2021    Procedure: AORTOGRAM, WITH EXTREMITY RUNOFF;  Surgeon: Ernst Chacko MD;  Location: [facility] OR 2ND FLR;  Service: Peripheral Vascular;  Laterality: Bilateral;    DEBRIDEMENT OF FOOT Left 2/23/2021    Procedure: DEBRIDEMENT, LEFT HEEL;  Surgeon: Mayra Schroeder DPM;  Location: [facility] OR 1ST FLR;  Service: Podiatry;  Laterality: Left;    FOOT AMPUTATION  October 2010    left high midfoot amputation    IMPLANTATION OF LEADLESS PACEMAKER N/A 10/12/2023    Procedure: [id number];  Surgeon: VANESSA Delatorre MD;  Location: [facility] EP LAB;  Service: Cardiology;  Laterality: N/A;  AVB, MICRA, EH, [place], [place] [id number]       Review of patient's allergies indicates:  No Known Allergies    Family History       Problem Relation (Age of Onset)    Cancer Brother    Diabetes Mother, Sister    Heart disease Mother    Stroke Sister            Tobacco Use    Smoking status: Never    Smokeless tobacco: Never   Substance and Sexual Activity    Alcohol use: No     Comment: occassional    Drug use: No    Sexual activity: Not Currently       Review of Systems    Objective:     Temp:  [98.1 °F (36.7 °C)-101.7 °F (38.7 °C)] 98.9 °F (37.2 °C)  Pulse:  [66-93] 82  Resp:  [16-24] 18  SpO2:  [86 %-100 %] 100 %  BP: (116-146)/(44-70) 146/56  Weight: 120.2 kg (264 lb 15.9 oz)  Body mass index is 39.13 kg/m².    Date 03/19/24 0700 - 03/20/24 0659   Shift 1104-9706 3417-8356 7253-2384 24 Hour Total   INTAKE   Shift Total(mL/kg)       OUTPUT   Urine(mL/kg/hr) 150(0.2)   150   Shift Total(mL/kg) 150(1.2)   150(1.2)   Weight (kg) 120.2 120.2 120.2 120.2          Drains       Drain  Duration             Male External Urinary Catheter 03/12/24 1300 7 days                     Physical Exam  Pulmonary:      Effort: Pulmonary effort is normal.   Genitourinary:     Comments: 22  Fr fung draining clear yellow urine   Musculoskeletal:      Comments: LE wounds, dressings/ ace wrap in place    Neurological:      Mental Status: He is alert.          Significant Labs:    BMP:  Recent Labs   Lab 03/16/24  0948 03/18/24  0608 03/19/24  0752    135* 141   K 4.2 4.8 4.9    101 105   CO2 26 24 28   BUN 20 29* 28*   CREATININE 1.5* 1.9* 1.8*   CALCIUM 8.4* 8.2* 8.5*       CBC:  Recent Labs   Lab 03/16/24  0948 03/16/24  2116 03/18/24  0608 03/19/24  0752   WBC 8.79  --  9.36 11.67   HGB 6.9* 8.0* 7.9* 8.2*   HCT 22.8* 26.4* 25.9* 27.3*   *  --  565* 542*       All pertinent labs results from the past 24 hours have been reviewed.    Significant Imaging:  All pertinent imaging results/findings from the past 24 hours have been reviewed.                    Assessment and Plan:     BPH (benign prostatic hyperplasia)  74M with chronic osteomyelitis and poorly controlled diabetes.  Urology consulted for urinary retention and difficult Fung placement.  - retention likely secondary to incomplete emptying from uncontrolled diabetes as well as BPH.  - continue Flomax  - monitor for post-obstructive diuresis   - recommend repeat renal US if Cr doesn't dario   - maintain Fung catheter for 7-10 days.  - we will arrange for outpatient urology follow-up for voiding trial  - rest of care per primary        VTE Risk Mitigation (From admission, onward)           Ordered     apixaban tablet 5 mg  2 times daily        See Hyperspace for full Linked Orders Report.    03/15/24 0954     apixaban tablet 10 mg  2 times daily        See Hyperspace for full Linked Orders Report.    03/15/24 0954                    Thank you for your consult. I will sign off. Please contact us if you have any additional questions.    Marisol Najera MD  Urology  [facility] - Telemetry Stepdown    Patient seen at the bedside.  He is draining clear yellow urine.  States he has no discomfort from the fung catheter.  Management as per  the above note.

## 2024-03-19 NOTE — SUBJECTIVE & OBJECTIVE
Interval HPI:   Overnight events: No acute events overnight. Patient in room 8078/8078 A. Blood glucose  variable . BG at, above, and below goal on current insulin regimen (SSI, prandial, and basal insulin ). Steroid use- None.    Renal function- Abnormal - Creatinine 1.8   Vasopressors-  None       Endocrine will continue to follow and manage insulin orders inpatient.       Diet diabetic 2000 Calorie  Diet NPO Except for: Medication     Eatin%  Nausea: No  Hypoglycemia and intervention: Yes, multiple hypoglycemic events, primarily due to over basal insulin dosing.   Fever: No  TPN and/or TF: No    PMH, PSH, FH, SH updated and reviewed     ROS:  Review of Systems   Constitutional:  Positive for unexpected weight change.   Eyes:  Positive for visual disturbance.   Respiratory:  Negative for cough.    Cardiovascular:  Negative for chest pain.   Gastrointestinal:  Positive for nausea and vomiting.   Endocrine: Positive for polydipsia and polyuria.   Musculoskeletal:  Negative for back pain.   Skin:  Negative for rash.   Neurological:  Negative for syncope.   Psychiatric/Behavioral:  Negative for agitation and dysphoric mood.        Current Medications and/or Treatments Impacting Glycemic Control  Immunotherapy:    Immunosuppressants       None          Steroids:   Hormones (From admission, onward)      Start     Stop Route Frequency Ordered    24 1536  melatonin tablet 6 mg         -- Oral Nightly PRN 24 1437          Pressors:    Autonomic Drugs (From admission, onward)      None          Hyperglycemia/Diabetes Medications:   Antihyperglycemics (From admission, onward)      Start     Stop Route Frequency Ordered    24 1645  insulin aspart U-100 pen 4 Units         -- SubQ with dinner 24 0856    24 1130  insulin aspart U-100 pen 5 Units         -- SubQ with lunch 24 1910    24 0900  insulin detemir U-100 (Levemir) pen 12 Units         -- SubQ 2 times daily 24 3729     03/19/24 0715  insulin aspart U-100 pen 5 Units         -- SubQ with breakfast 03/18/24 1910    03/11/24 1521  insulin aspart U-100 pen 0-5 Units         -- SubQ Before meals & nightly PRN 03/11/24 1520             PHYSICAL EXAMINATION:  Vitals:    03/19/24 1513   BP: (!) 146/56   Pulse: 82   Resp: 18   Temp: 98.9 °F (37.2 °C)     Body mass index is 39.13 kg/m².     Physical Exam  Constitutional:       Appearance: He is well-developed.   HENT:      Head: Normocephalic.   Eyes:      Conjunctiva/sclera: Conjunctivae normal.   Pulmonary:      Effort: Pulmonary effort is normal.   Musculoskeletal:         General: Normal range of motion.   Skin:     General: Skin is warm.      Findings: No rash.   Neurological:      Mental Status: He is alert and oriented to person, place, and time.

## 2024-03-19 NOTE — ASSESSMENT & PLAN NOTE
Patient with Proteus and enterobacter on leg cultures collected by Podiatry with concern for acute on chronic osteo. Fungal culture grew fusarium. He has started spiking fever and still is not agreeable to amputation, will do infectious work up to look out for source.    -UA reflex to culture  -CXR   -Blood culture  -Inpatient consult to ID and vascular surgery.  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

## 2024-03-19 NOTE — ASSESSMENT & PLAN NOTE
74M with chronic osteomyelitis and poorly controlled diabetes.  Urology consulted for urinary retention and difficult Sams placement.  - retention likely secondary to incomplete emptying from uncontrolled diabetes as well as BPH.  - continue Flomax  - maintain Sams catheter for 7-10 days.  - we will arrange for outpatient urology follow-up for voiding trial  - rest of care per primary

## 2024-03-19 NOTE — SUBJECTIVE & OBJECTIVE
"Medications Prior to Admission   Medication Sig Dispense Refill Last Dose    acetaminophen (TYLENOL) 325 MG tablet Take 650 mg by mouth every 6 (six) hours as needed for Temperature greater than.   Unknown    acidophilus-pectin, citrus 100 million cell-10 mg Cap Take 1 capsule by mouth 2 (two) times a day.   Unknown    ascorbic acid, vitamin C, (VITAMIN C) 500 MG tablet Take 500 mg by mouth 2 (two) times daily.   Unknown    B COMPLEX-VITAMIN B12 tablet Take 1 tablet by mouth once daily.   Unknown    BD AUTOSHIELD DUO PEN NEEDLE 30 gauge x 3/16" Ndle    Unknown    BD ULTRA-FINE ADITYA PEN NEEDLE 32 gauge x 5/32" Ndle USE FOUR TIMES DAILY WITH MEALS AND NIGHTLY 100 each 0 Unknown    blood sugar diagnostic (CONTOUR TEST STRIPS) Strp Inject 1 strip into the skin 2 (two) times daily before meals. 100 strip 6 Unknown    clopidogreL (PLAVIX) 75 mg tablet Take 1 tablet (75 mg total) by mouth once daily. 60 tablet 0     gabapentin (NEURONTIN) 300 MG capsule Take 300 mg by mouth 2 (two) times daily.   Unknown    insulin aspart U-100 (NOVOLOG) 100 unit/mL (3 mL) InPn pen Inject 6 Units into the skin 3 (three) times daily with meals. 30 mL 3 Unknown    MICROLET LANCET Misc   0 Unknown    multivitamin (THERAGRAN) per tablet Take 1 tablet by mouth once daily.   Unknown    NIFEdipine (PROCARDIA-XL) 60 MG (OSM) 24 hr tablet Take 1 tablet (60 mg total) by mouth once daily. 30 tablet 2 Unknown    pantoprazole (PROTONIX) 40 MG tablet Take 40 mg by mouth once daily. Before breakfast   Unknown    pen needle, diabetic 32 gauge x 5/32" Ndle use as directed with insulin 100 each 2 Unknown    rosuvastatin (CRESTOR) 40 MG Tab Take 40 mg by mouth once daily.   Unknown    tamsulosin (FLOMAX) 0.4 mg Cap Take 0.4 mg by mouth once daily.   Unknown    triamcinolone acetonide 0.025% (KENALOG) 0.025 % Oint Apply topically 2 (two) times daily as needed (to affected area).   Unknown    [DISCONTINUED] apixaban (ELIQUIS) 5 mg Tab Take 1 tablet (5 mg " total) by mouth 2 (two) times daily. 60 tablet 0 Unknown    [DISCONTINUED] diphenhydrAMINE (BENADRYL) 25 mg capsule No directions listed on the patients medication list.   Unknown    [DISCONTINUED] furosemide (LASIX) 40 MG tablet Take 20 mg by mouth.   Unknown    [DISCONTINUED] glipiZIDE (GLUCOTROL) 10 MG tablet Take 20 mg by mouth 2 (two) times a day.   Unknown    [DISCONTINUED] hydrALAZINE (APRESOLINE) 100 MG tablet Take 1 tablet (100 mg total) by mouth every 8 (eight) hours. 90 tablet 11 Unknown    [DISCONTINUED] isosorbide dinitrate (ISORDIL) 10 MG tablet Take 1 tablet (10 mg total) by mouth 3 (three) times daily. 90 tablet 11     [DISCONTINUED] LANTUS SOLOSTAR U-100 INSULIN glargine 100 units/mL SubQ pen Inject 45 Units into the skin every evening. (Patient taking differently: Inject 45 Units into the skin once daily.)  0 Unknown    [DISCONTINUED] miconazole NITRATE 2 % (MICOTIN) 2 % top powder Apply topically 2 (two) times daily. 20 g 0 Unknown    [DISCONTINUED] oxyCODONE 5 mg TbOr Take 1 tablet by mouth every 6 (six) hours as needed (Pain (Max 5 daily)).   Unknown    [DISCONTINUED] sodium hypochlorite 0.5 % (DAKIN'S SOLUTION) external solution Apply topically once daily.   Unknown       Review of patient's allergies indicates:  No Known Allergies    Past Medical History:   Diagnosis Date    Arthritis     legs    Bacteremia due to Gram-negative bacteria 3/23/2021    Diabetes mellitus     Diabetes mellitus, type 2     Hyperlipidemia     Hypertension     Osteomyelitis     Palliative care encounter 5/24/2023     Past Surgical History:   Procedure Laterality Date    ANGIOGRAPHY OF LOWER EXTREMITY N/A 2/3/2021    Procedure: Angiogram Extremity Bilateral;  Surgeon: Ernst Chacko MD;  Location: [facility] OR [address] [place] [address];  Service: Peripheral Vascular;  Laterality: N/A;  7.4 mintues fluoroscopy time  816.15 mGy  170.17 Gycm2    AORTOGRAPHY WITH EXTREMITY RUNOFF Bilateral 2/3/2021    Procedure: AORTOGRAM, WITH EXTREMITY  RUNOFF;  Surgeon: Ernst Chacko MD;  Location: [facility] OR 2ND FLR;  Service: Peripheral Vascular;  Laterality: Bilateral;    DEBRIDEMENT OF FOOT Left 2/23/2021    Procedure: DEBRIDEMENT, LEFT HEEL;  Surgeon: Mayra Schroeder DPM;  Location: [facility] OR 1ST FLR;  Service: Podiatry;  Laterality: Left;    FOOT AMPUTATION  October 2010    left high midfoot amputation    IMPLANTATION OF LEADLESS PACEMAKER N/A 10/12/2023    Procedure: [id number];  Surgeon: VANESSA Delatorre MD;  Location: [facility] EP LAB;  Service: Cardiology;  Laterality: N/A;  AVB, MICRA, EH, ANES, RM [id number]     Family History       Problem Relation (Age of Onset)    Cancer Brother    Diabetes Mother, Sister    Heart disease Mother    Stroke Sister          Tobacco Use    Smoking status: Never    Smokeless tobacco: Never   Substance and Sexual Activity    Alcohol use: No     Comment: occassional    Drug use: No    Sexual activity: Not Currently     Review of Systems   Constitutional:  Positive for fatigue and fever.   HENT: Negative.     Eyes: Negative.    Respiratory: Negative.     Cardiovascular: Negative.    Gastrointestinal: Negative.    Endocrine: Negative.    Genitourinary: Negative.    Skin:  Positive for color change and wound.        Reports wound and previous amputation of L foot   Allergic/Immunologic: Negative.    Hematological: Negative.    Psychiatric/Behavioral: Negative.       Objective:     Vital Signs (Most Recent):  Temp: 99 °F (37.2 °C) (03/19/24 1200)  Pulse: 81 (03/19/24 1145)  Resp: (!) 24 (03/19/24 1145)  BP: (!) 138/57 (03/19/24 1145)  SpO2: 100 % (03/19/24 1145) Vital Signs (24h Range):  Temp:  [98.1 °F (36.7 °C)-101.7 °F (38.7 °C)] 99 °F (37.2 °C)  Pulse:  [66-91] 81  Resp:  [16-24] 24  SpO2:  [86 %-100 %] 100 %  BP: (116-138)/(44-70) 138/57     Weight: 120.2 kg (264 lb 15.9 oz)  Body mass index is 39.13 kg/m².      Physical Exam  Constitutional:       Appearance: He is obese. He is ill-appearing.   HENT:       Head: Normocephalic.   Cardiovascular:      Rate and Rhythm: Normal rate and regular rhythm.   Musculoskeletal:         General: Swelling and deformity present.      Right lower leg: Edema present.      Left lower leg: Edema present.      Comments: s/p L Chopart amputation, healing poorly   Skin:     General: Skin is dry.      Findings: Bruising, lesion and rash present.      Comments: b/l LE swelling with thick discolored skin. Wound on plantar L foot.    Neurological:      Mental Status: Mental status is at baseline. He is disoriented.   Psychiatric:         Mood and Affect: Mood normal.          Significant Labs:  BMP:   Recent Labs   Lab 03/18/24  0608 03/19/24  0752   * 183*   * 141   K 4.8 4.9    105   CO2 24 28   BUN 29* 28*   CREATININE 1.9* 1.8*   CALCIUM 8.2* 8.5*   MG 1.9  --      CBC:   Recent Labs   Lab 03/19/24  0752   WBC 11.67   RBC 3.36*   HGB 8.2*   HCT 27.3*   *   MCV 81*   MCH 24.4*   MCHC 30.0*     Significant Diagnostics:  I have reviewed and interpreted all pertinent imaging results/findings within the past 24 hours.

## 2024-03-19 NOTE — CONSULTS
[facility] - Telemetry Stepdown  Vascular Surgery  Consult Note    Inpatient consult to Vascular Surgery  Consult performed by: Tyra Aguero DPM  Consult ordered by: Lynette Perkins MD  Reason for consult: see below        Subjective:     Chief Complaint/Reason for Admission: DKA and OM to L LE    History of Present Illness: Saji Castañeda is a 75 yo M with a history of Afib, T2D, HLD, HTN, chronic OM of L heel, s/p L TMA, PID, ESBL Klebsiella and Acinetobacter bacteremia who was admitted after being found down at home and DKA.    Vascular Surgery was re-consulted to discuss option of an AKA due to LLE calcaneaus OM. During his previous admission in May 2023 Vascular surgery was consulted and recommended an AKA which he was not amendable to at that time. We were consulted on 3/7 for the same complaint and patient continued to not be amendable to amputation. Today patient was febrile and WBC 11.76. Patient is somnolent and weens in and out of sleep. Patient is non-ambulatory and mostly in wheelchair. Family is concerned if patient will be able to walk after amputation and asks if there are options for prosthetics. After discussing with the patient that amputation is the gold stand for infection source control the patient is now amendable for an AKA. Family members (Karen-niece, Kandy-sister) are bedside and also agree with continuing with amputation    Febrile, WBC 11.76, Glucose 183. US LE Venous studies shows DVT of the R brachial vein surrounding venous line only. L LE Xrays from 10/3/23 shows acute OM of the posterior aspect of the calcaneus.     Medications Prior to Admission   Medication Sig Dispense Refill Last Dose    acetaminophen (TYLENOL) 325 MG tablet Take 650 mg by mouth every 6 (six) hours as needed for Temperature greater than.   Unknown    acidophilus-pectin, citrus 100 million cell-10 mg Cap Take 1 capsule by mouth 2 (two) times a day.   Unknown    ascorbic acid, vitamin C, (VITAMIN C) 500 MG  "tablet Take 500 mg by mouth 2 (two) times daily.   Unknown    B COMPLEX-VITAMIN B12 tablet Take 1 tablet by mouth once daily.   Unknown    BD AUTOSHIELD DUO PEN NEEDLE 30 gauge x 3/16" Ndle    Unknown    BD ULTRA-FINE ADITYA PEN NEEDLE 32 gauge x 5/32" Ndle USE FOUR TIMES DAILY WITH MEALS AND NIGHTLY 100 each 0 Unknown    blood sugar diagnostic (CONTOUR TEST STRIPS) Strp Inject 1 strip into the skin 2 (two) times daily before meals. 100 strip 6 Unknown    clopidogreL (PLAVIX) 75 mg tablet Take 1 tablet (75 mg total) by mouth once daily. 60 tablet 0     gabapentin (NEURONTIN) 300 MG capsule Take 300 mg by mouth 2 (two) times daily.   Unknown    insulin aspart U-100 (NOVOLOG) 100 unit/mL (3 mL) InPn pen Inject 6 Units into the skin 3 (three) times daily with meals. 30 mL 3 Unknown    MICROLET LANCET Misc   0 Unknown    multivitamin (THERAGRAN) per tablet Take 1 tablet by mouth once daily.   Unknown    NIFEdipine (PROCARDIA-XL) 60 MG (OSM) 24 hr tablet Take 1 tablet (60 mg total) by mouth once daily. 30 tablet 2 Unknown    pantoprazole (PROTONIX) 40 MG tablet Take 40 mg by mouth once daily. Before breakfast   Unknown    pen needle, diabetic 32 gauge x 5/32" Ndle use as directed with insulin 100 each 2 Unknown    rosuvastatin (CRESTOR) 40 MG Tab Take 40 mg by mouth once daily.   Unknown    tamsulosin (FLOMAX) 0.4 mg Cap Take 0.4 mg by mouth once daily.   Unknown    triamcinolone acetonide 0.025% (KENALOG) 0.025 % Oint Apply topically 2 (two) times daily as needed (to affected area).   Unknown    [DISCONTINUED] apixaban (ELIQUIS) 5 mg Tab Take 1 tablet (5 mg total) by mouth 2 (two) times daily. 60 tablet 0 Unknown    [DISCONTINUED] diphenhydrAMINE (BENADRYL) 25 mg capsule No directions listed on the patients medication list.   Unknown    [DISCONTINUED] furosemide (LASIX) 40 MG tablet Take 20 mg by mouth.   Unknown    [DISCONTINUED] glipiZIDE (GLUCOTROL) 10 MG tablet Take 20 mg by mouth 2 (two) times a day.   Unknown    " [DISCONTINUED] hydrALAZINE (APRESOLINE) 100 MG tablet Take 1 tablet (100 mg total) by mouth every 8 (eight) hours. 90 tablet 11 Unknown    [DISCONTINUED] isosorbide dinitrate (ISORDIL) 10 MG tablet Take 1 tablet (10 mg total) by mouth 3 (three) times daily. 90 tablet 11     [DISCONTINUED] LANTUS SOLOSTAR U-100 INSULIN glargine 100 units/mL SubQ pen Inject 45 Units into the skin every evening. (Patient taking differently: Inject 45 Units into the skin once daily.)  0 Unknown    [DISCONTINUED] miconazole NITRATE 2 % (MICOTIN) 2 % top powder Apply topically 2 (two) times daily. 20 g 0 Unknown    [DISCONTINUED] oxyCODONE 5 mg TbOr Take 1 tablet by mouth every 6 (six) hours as needed (Pain (Max 5 daily)).   Unknown    [DISCONTINUED] sodium hypochlorite 0.5 % (DAKIN'S SOLUTION) external solution Apply topically once daily.   Unknown       Review of patient's allergies indicates:  No Known Allergies    Past Medical History:   Diagnosis Date    Arthritis     legs    Bacteremia due to Gram-negative bacteria 3/23/2021    Diabetes mellitus     Diabetes mellitus, type 2     Hyperlipidemia     Hypertension     Osteomyelitis     Palliative care encounter 5/24/2023     Past Surgical History:   Procedure Laterality Date    ANGIOGRAPHY OF LOWER EXTREMITY N/A 2/3/2021    Procedure: Angiogram Extremity Bilateral;  Surgeon: Ernst Chacko MD;  Location: [address];  Service: Peripheral Vascular;  Laterality: N/A;  7.4 mintues fluoroscopy time  816.15 mGy  170.17 Gycm2    AORTOGRAPHY WITH EXTREMITY RUNOFF Bilateral 2/3/2021    Procedure: AORTOGRAM, WITH EXTREMITY RUNOFF;  Surgeon: Ernst Chacko MD;  Location: [facility] OR [address] [place] [address];  Service: Peripheral Vascular;  Laterality: Bilateral;    DEBRIDEMENT OF FOOT Left 2/23/2021    Procedure: DEBRIDEMENT, LEFT HEEL;  Surgeon: Mayra Schroeder DPM;  Location: [facility] OR [address] [place] [address];  Service: Podiatry;  Laterality: Left;    FOOT AMPUTATION  October 2010    left high midfoot  amputation    IMPLANTATION OF LEADLESS PACEMAKER N/A 10/12/2023    Procedure: [id number];  Surgeon: VANESSA Delatorre MD;  Location: [facility];  Service: Cardiology;  Laterality: N/A;  AVB, MICRA, EH, ANES, RM [id number]     Family History       Problem Relation (Age of Onset)    Cancer Brother    Diabetes Mother, Sister    Heart disease Mother    Stroke Sister          Tobacco Use    Smoking status: Never    Smokeless tobacco: Never   Substance and Sexual Activity    Alcohol use: No     Comment: occassional    Drug use: No    Sexual activity: Not Currently     Review of Systems   Constitutional:  Positive for fatigue and fever.   HENT: Negative.     Eyes: Negative.    Respiratory: Negative.     Cardiovascular: Negative.    Gastrointestinal: Negative.    Endocrine: Negative.    Genitourinary: Negative.    Skin:  Positive for color change and wound.        Reports wound and previous amputation of L foot   Allergic/Immunologic: Negative.    Hematological: Negative.    Psychiatric/Behavioral: Negative.       Objective:     Vital Signs (Most Recent):  Temp: 99 °F (37.2 °C) (03/19/24 1200)  Pulse: 81 (03/19/24 1145)  Resp: (!) 24 (03/19/24 1145)  BP: (!) 138/57 (03/19/24 1145)  SpO2: 100 % (03/19/24 1145) Vital Signs (24h Range):  Temp:  [98.1 °F (36.7 °C)-101.7 °F (38.7 °C)] 99 °F (37.2 °C)  Pulse:  [66-91] 81  Resp:  [16-24] 24  SpO2:  [86 %-100 %] 100 %  BP: (116-138)/(44-70) 138/57     Weight: 120.2 kg (264 lb 15.9 oz)  Body mass index is 39.13 kg/m².      Physical Exam  Constitutional:       Appearance: He is obese. He is ill-appearing.   HENT:      Head: Normocephalic.   Cardiovascular:      Rate and Rhythm: Normal rate and regular rhythm.   Musculoskeletal:         General: Swelling and deformity present.      Right lower leg: Edema present.      Left lower leg: Edema present.      Comments: s/p L Chopart amputation, healing poorly   Skin:     General: Skin is dry.      Findings: Bruising, lesion  and rash present.      Comments: b/l LE swelling with thick discolored skin. Wound on plantar L foot.    Neurological:      Mental Status: Mental status is at baseline. He is disoriented.   Psychiatric:         Mood and Affect: Mood normal.          Significant Labs:  BMP:   Recent Labs   Lab 03/18/24  0608 03/19/24  0752   * 183*   * 141   K 4.8 4.9    105   CO2 24 28   BUN 29* 28*   CREATININE 1.9* 1.8*   CALCIUM 8.2* 8.5*   MG 1.9  --      CBC:   Recent Labs   Lab 03/19/24  0752   WBC 11.67   RBC 3.36*   HGB 8.2*   HCT 27.3*   *   MCV 81*   MCH 24.4*   MCHC 30.0*     Significant Diagnostics:  I have reviewed and interpreted all pertinent imaging results/findings within the past 24 hours.  Assessment/Plan:     * Osteomyelitis of left lower extremity  Vascular Surgery re-consulted for to discuss AKA due to LLE calcaneus OM. Patient febrile today and WBC 11.67. Xray L foot shows acute OM to calcaneus. Patient was previously not amendable to AKA but has now agreed to continue with amputation.    - Plan for AKA Wednesday 3/27  - Case request in  - Will update with timing pending OR schedule  - Discontinue eliquis and start heparin today. Will need to discontinue heparin night before surgery  - NPO at midnight before OR  - All other care per primary  - Vascular surgery will continue to follow     Thank you for your consult. I will follow-up with patient. Please contact us if you have any additional questions.    Tyra Aguero DPM  Vascular Surgery  [facility] - Telemetry Stepdown

## 2024-03-19 NOTE — ASSESSMENT & PLAN NOTE
Patient with Persistent (7 days or more) atrial fibrillation which is uncontrolled currently with    . Patient is currently in sinus rhythm.[id number] Score: 4. . Anticoagulation indicated. Anticoagulation done with lovenox as we could not get IV access for heparin  .    -apixaban 5 mg after finishing loading 10 mg eliquis for DVT

## 2024-03-19 NOTE — ASSESSMENT & PLAN NOTE
Endocrinology consulted for BG management.   BG goal 140-180    WBD 0.3 units/kg/day  - Levemir (Insulin Detemir) 12 units BID  - Novolog (Insulin Aspart) 5 units with breakfast and lunch and 4 units with dinner and prn for BG excursions LDC SSI (150/50)  - BG checks AC/HS  - Hypoglycemia protocol in place  - If blood glucose greater than 300, please ask patient not to eat food or drink anything other than water until correctional insulin has brought it back below 250    ** Please notify Endocrine for any change and/or advance in diet**  ** Please call Endocrine for any BG related issues **    Discharge Planning:   TBD. Please notify endocrinology prior to discharge.

## 2024-03-19 NOTE — PT/OT/SLP PROGRESS
Occupational Therapy      Patient Name:  Saji Castañeda   MRN:  [id number]    Attempted OT session at 13:37. Upon arrival in pt's room, pt found supine with HOB elevated. Pt reported 8/10 pain L LE.Therapist asked pt, if he would like to sit up edge of bed and  pt politely declined therapy today stating he did not want to do anything. He requested to be seen tomorrow. Therapist notified nurse, Marion, of pt's refusal and L LE pain. Will follow-up with pt 3/19/24 and continue  OT POC.    CLAUDIA Del Rio  3/19/24

## 2024-03-19 NOTE — HPI
Saji Castañeda is a 73 yo M with a history of Afib, T2D, HLD, HTN, chronic osteomyelitis of L heel, s/p L TMA, PID, ESBL Klebsiella and Acinetobacter bacteremia who was admitted after being found down at home and DKA. Urology consulted for urinary retention and difficult fung placement.      Patient endorses mild suprapubic pain.  No prior episodes of retention.  He was previously followed for his BPH by Dr. Watson. PVR in 2022 office visit was 237.       Nursing reports bladder scan of 1300 mL.  Attempts by nursing to place catheter were unsuccessful due to meeting resistance.  No recent abdominal imaging.  Creatinine 1.8 from baseline 1.3-1.7.  No recent urine studies.  Last A1c > 14.     Twenty-two Greek coude catheter was placed at bedside with some resistance at the prostate.  There was 1100 mL of clear yellow urine output.

## 2024-03-19 NOTE — CLINICAL REVIEW
"RAPID RESPONSE NURSE CHART REVIEW        Chart Reviewed: 03/19/2024, 8:57 AM    MRN: [id number]  Bed: 8078/8078 A    Dx: Osteomyelitis of left lower extremity    Saji Castañeda has a past medical history of Arthritis, Bacteremia due to Gram-negative bacteria, Diabetes mellitus, Diabetes mellitus, type 2, Hyperlipidemia, Hypertension, Osteomyelitis, and Palliative care encounter.    Last VS: /70   Pulse 91   Temp (!) 101.6 °F (38.7 °C)   Resp (!) 22   Ht 5' 9" (1.753 m)   Wt 120.2 kg (264 lb 15.9 oz)   SpO2 (!) 94%   BMI 39.13 kg/m²     24H Vital Sign Range:  Temp:  [98.1 °F (36.7 °C)-101.6 °F (38.7 °C)]   Pulse:  [66-91]   Resp:  [16-22]   BP: (116-138)/(44-70)   SpO2:  [86 %-100 %]     Level of Consciousness (AVPU): alert    Recent Labs     03/16/24  0948 03/16/24  2116 03/18/24  0608 03/19/24  0752   WBC 8.79  --  9.36 11.67   HGB 6.9* 8.0* 7.9* 8.2*   HCT 22.8* 26.4* 25.9* 27.3*   *  --  565* 542*       Recent Labs     03/16/24  0948 03/18/24  0608 03/19/24  0752    135* 141   K 4.2 4.8 4.9    101 105   CO2 26 24 28   BUN 20 29* 28*   CREATININE 1.5* 1.9* 1.8*   * 277* 183*   PHOS  --  4.5  --    MG  --  1.9  --           MEWS score: 1    Charge RN, Kendra  contacted for fever. Reports prn tylenol ordered, continuing treatment for known osteomyelitis. No additional concerns verbalized at this time. Instructed to call [phone] for further concerns or assistance.    Whitney Peralta RN        "

## 2024-03-19 NOTE — ASSESSMENT & PLAN NOTE
Patient with acute kidney injury/acute renal failure likely due to pre-renal azotemia due to dehydration YNES is currently worsening. Baseline creatinine  1.1  - Labs reviewed- Renal function/electrolytes with Estimated Creatinine Clearance: 45.4 mL/min (A) (based on SCr of 1.8 mg/dL (H)). according to latest data. Monitor urine output and serial BMP and adjust therapy as needed. Avoid nephrotoxins and renally dose meds for GFR listed above.    Creatinine 2.1 on admit, baseline around 1.1  - Likely pre-renal from poor PO intake    Lab Results   Component Value Date    CREATININE 1.8 (H) 03/19/2024       Plan:   - giving 1 liter of LR  - Check urine lytes urine protein creatinine ratio if YNES does not improve w/ fluids  - Strict I&Os and daily weights   - Daily BMP  - Trend sCr  - Avoid nephrotoxic agents such as NSAIDs, ACEi, ARBs and IV radiocontrast.  - Renally dose meds to current GFR.  - Maintain MAP > 65.  - No indications for HD at this time.   - Maintain electrolytes at goal: Mg > 2, Phos > 3, K > 4.

## 2024-03-19 NOTE — SUBJECTIVE & OBJECTIVE
Past Medical History:   Diagnosis Date    Arthritis     legs    Bacteremia due to Gram-negative bacteria 3/23/2021    Diabetes mellitus     Diabetes mellitus, type 2     Hyperlipidemia     Hypertension     Osteomyelitis     Palliative care encounter 5/24/2023       Past Surgical History:   Procedure Laterality Date    ANGIOGRAPHY OF LOWER EXTREMITY N/A 2/3/2021    Procedure: Angiogram Extremity Bilateral;  Surgeon: Ernst Chacko MD;  Location: [facility] OR [address] [place] [address];  Service: Peripheral Vascular;  Laterality: N/A;  7.4 mintues fluoroscopy time  816.15 mGy  170.17 Gycm2    AORTOGRAPHY WITH EXTREMITY RUNOFF Bilateral 2/3/2021    Procedure: AORTOGRAM, WITH EXTREMITY RUNOFF;  Surgeon: Ernst Chakco MD;  [facility];  Service: Peripheral Vascular;  Laterality: Bilateral;    DEBRIDEMENT OF FOOT Left 2/23/2021    Procedure: DEBRIDEMENT, LEFT HEEL;  Surgeon: Mayra Schroeder DPM;  Location: [facility] OR 1ST FLR;  Service: Podiatry;  Laterality: Left;    FOOT AMPUTATION  October 2010    left high midfoot amputation    IMPLANTATION OF LEADLESS PACEMAKER N/A 10/12/2023    Procedure: [id number];  Surgeon: VANESSA Delatorre MD;  Location: [facility] EP LAB;  Service: Cardiology;  Laterality: N/A;  AVB, MICRA, EH, ANES, RM [address]       Review of patient's allergies indicates:  No Known Allergies    Family History       Problem Relation (Age of Onset)    Cancer Brother    Diabetes Mother, Sister    Heart disease Mother    Stroke Sister            Tobacco Use    Smoking status: Never    Smokeless tobacco: Never   Substance and Sexual Activity    Alcohol use: No     Comment: occassional    Drug use: No    Sexual activity: Not Currently       Review of Systems    Objective:     Temp:  [98.1 °F (36.7 °C)-101.7 °F (38.7 °C)] 98.9 °F (37.2 °C)  Pulse:  [66-93] 82  Resp:  [16-24] 18  SpO2:  [86 %-100 %] 100 %  BP: (116-146)/(44-70) 146/56  Weight: 120.2 kg (264 lb 15.9 oz)  Body mass index is 39.13  kg/m².    Date 03/19/24 0700 - 03/20/24 0659   Shift 5247-5264 4668-4706 1212-3835 24 Hour Total   INTAKE   Shift Total(mL/kg)       OUTPUT   Urine(mL/kg/hr) 150(0.2)   150   Shift Total(mL/kg) 150(1.2)   150(1.2)   Weight (kg) 120.2 120.2 120.2 120.2          Drains       Drain  Duration             Male External Urinary Catheter 03/12/24 1300 7 days                     Physical Exam  Pulmonary:      Effort: Pulmonary effort is normal.   Genitourinary:     Comments: 22 Fr fung draining clear yellow urine   Musculoskeletal:      Comments: LE wounds, dressings/ ace wrap in place    Neurological:      Mental Status: He is alert.          Significant Labs:    BMP:  Recent Labs   Lab 03/16/24  0948 03/18/24  0608 03/19/24  0752    135* 141   K 4.2 4.8 4.9    101 105   CO2 26 24 28   BUN 20 29* 28*   CREATININE 1.5* 1.9* 1.8*   CALCIUM 8.4* 8.2* 8.5*       CBC:  Recent Labs   Lab 03/16/24  0948 03/16/24  2116 03/18/24  0608 03/19/24  0752   WBC 8.79  --  9.36 11.67   HGB 6.9* 8.0* 7.9* 8.2*   HCT 22.8* 26.4* 25.9* 27.3*   *  --  565* 542*       All pertinent labs results from the past 24 hours have been reviewed.    Significant Imaging:  All pertinent imaging results/findings from the past 24 hours have been reviewed.

## 2024-03-20 ENCOUNTER — TELEPHONE (OUTPATIENT)
Dept: UROLOGY | Facility: CLINIC | Age: 74
End: 2024-03-20
Payer: MEDICARE

## 2024-03-20 PROBLEM — R33.9 URINARY RETENTION: Status: ACTIVE | Noted: 2024-03-20

## 2024-03-20 LAB
ALBUMIN SERPL BCP-MCNC: 1.8 G/DL (ref 3.5–5.2)
ALP SERPL-CCNC: 68 U/L (ref 55–135)
ALT SERPL W/O P-5'-P-CCNC: 8 U/L (ref 10–44)
ANION GAP SERPL CALC-SCNC: 10 MMOL/L (ref 8–16)
APTT PPP: 121.7 SEC (ref 21–32)
APTT PPP: 34.5 SEC (ref 21–32)
APTT PPP: 35 SEC (ref 21–32)
AST SERPL-CCNC: 20 U/L (ref 10–40)
BASOPHILS # BLD AUTO: 0.05 K/UL (ref 0–0.2)
BASOPHILS # BLD AUTO: 0.05 K/UL (ref 0–0.2)
BASOPHILS NFR BLD: 0.6 % (ref 0–1.9)
BASOPHILS NFR BLD: 0.6 % (ref 0–1.9)
BILIRUB SERPL-MCNC: 0.3 MG/DL (ref 0.1–1)
BUN SERPL-MCNC: 31 MG/DL (ref 8–23)
CALCIUM SERPL-MCNC: 8.6 MG/DL (ref 8.7–10.5)
CHLORIDE SERPL-SCNC: 105 MMOL/L (ref 95–110)
CO2 SERPL-SCNC: 26 MMOL/L (ref 23–29)
CREAT SERPL-MCNC: 1.7 MG/DL (ref 0.5–1.4)
CREAT UR-MCNC: 125 MG/DL (ref 23–375)
DIFFERENTIAL METHOD BLD: ABNORMAL
DIFFERENTIAL METHOD BLD: ABNORMAL
EOSINOPHIL # BLD AUTO: 0.1 K/UL (ref 0–0.5)
EOSINOPHIL # BLD AUTO: 0.1 K/UL (ref 0–0.5)
EOSINOPHIL NFR BLD: 1 % (ref 0–8)
EOSINOPHIL NFR BLD: 1.2 % (ref 0–8)
ERYTHROCYTE [DISTWIDTH] IN BLOOD BY AUTOMATED COUNT: 16.6 % (ref 11.5–14.5)
ERYTHROCYTE [DISTWIDTH] IN BLOOD BY AUTOMATED COUNT: 16.7 % (ref 11.5–14.5)
EST. GFR  (NO RACE VARIABLE): 41.5 ML/MIN/1.73 M^2
GLUCOSE SERPL-MCNC: 155 MG/DL (ref 70–110)
HCT VFR BLD AUTO: 24.9 % (ref 40–54)
HCT VFR BLD AUTO: 25.1 % (ref 40–54)
HGB BLD-MCNC: 7.6 G/DL (ref 14–18)
HGB BLD-MCNC: 7.6 G/DL (ref 14–18)
IMM GRANULOCYTES # BLD AUTO: 0.02 K/UL (ref 0–0.04)
IMM GRANULOCYTES # BLD AUTO: 0.04 K/UL (ref 0–0.04)
IMM GRANULOCYTES NFR BLD AUTO: 0.2 % (ref 0–0.5)
IMM GRANULOCYTES NFR BLD AUTO: 0.5 % (ref 0–0.5)
INR PPP: 1.2 (ref 0.8–1.2)
LYMPHOCYTES # BLD AUTO: 0.9 K/UL (ref 1–4.8)
LYMPHOCYTES # BLD AUTO: 0.9 K/UL (ref 1–4.8)
LYMPHOCYTES NFR BLD: 10.3 % (ref 18–48)
LYMPHOCYTES NFR BLD: 10.9 % (ref 18–48)
MAGNESIUM SERPL-MCNC: 2.2 MG/DL (ref 1.6–2.6)
MCH RBC QN AUTO: 24.3 PG (ref 27–31)
MCH RBC QN AUTO: 24.4 PG (ref 27–31)
MCHC RBC AUTO-ENTMCNC: 30.3 G/DL (ref 32–36)
MCHC RBC AUTO-ENTMCNC: 30.5 G/DL (ref 32–36)
MCV RBC AUTO: 80 FL (ref 82–98)
MCV RBC AUTO: 80 FL (ref 82–98)
MONOCYTES # BLD AUTO: 0.5 K/UL (ref 0.3–1)
MONOCYTES # BLD AUTO: 0.6 K/UL (ref 0.3–1)
MONOCYTES NFR BLD: 5.8 % (ref 4–15)
MONOCYTES NFR BLD: 6.6 % (ref 4–15)
NEUTROPHILS # BLD AUTO: 6.9 K/UL (ref 1.8–7.7)
NEUTROPHILS # BLD AUTO: 6.9 K/UL (ref 1.8–7.7)
NEUTROPHILS NFR BLD: 80.4 % (ref 38–73)
NEUTROPHILS NFR BLD: 81.9 % (ref 38–73)
NRBC BLD-RTO: 0 /100 WBC
NRBC BLD-RTO: 0 /100 WBC
PHOSPHATE SERPL-MCNC: 4.3 MG/DL (ref 2.7–4.5)
PLATELET # BLD AUTO: 556 K/UL (ref 150–450)
PLATELET # BLD AUTO: 561 K/UL (ref 150–450)
PMV BLD AUTO: 8.8 FL (ref 9.2–12.9)
PMV BLD AUTO: 8.8 FL (ref 9.2–12.9)
POCT GLUCOSE: 184 MG/DL (ref 70–110)
POCT GLUCOSE: 185 MG/DL (ref 70–110)
POCT GLUCOSE: 189 MG/DL (ref 70–110)
POCT GLUCOSE: 195 MG/DL (ref 70–110)
POCT GLUCOSE: 237 MG/DL (ref 70–110)
POTASSIUM SERPL-SCNC: 4.7 MMOL/L (ref 3.5–5.1)
PROT SERPL-MCNC: 7 G/DL (ref 6–8.4)
PROTHROMBIN TIME: 13.1 SEC (ref 9–12.5)
RBC # BLD AUTO: 3.12 M/UL (ref 4.6–6.2)
RBC # BLD AUTO: 3.13 M/UL (ref 4.6–6.2)
SODIUM SERPL-SCNC: 141 MMOL/L (ref 136–145)
SODIUM UR-SCNC: 16 MMOL/L (ref 20–250)
TB INDURATION 48 - 72 HR READ: 0 MM
WBC # BLD AUTO: 8.44 K/UL (ref 3.9–12.7)
WBC # BLD AUTO: 8.6 K/UL (ref 3.9–12.7)

## 2024-03-20 PROCEDURE — 25000003 PHARM REV CODE 250: Performed by: INTERNAL MEDICINE

## 2024-03-20 PROCEDURE — 63600175 PHARM REV CODE 636 W HCPCS: Performed by: PHYSICIAN ASSISTANT

## 2024-03-20 PROCEDURE — 85730 THROMBOPLASTIN TIME PARTIAL: CPT | Mod: 91 | Performed by: STUDENT IN AN ORGANIZED HEALTH CARE EDUCATION/TRAINING PROGRAM

## 2024-03-20 PROCEDURE — 84100 ASSAY OF PHOSPHORUS: CPT

## 2024-03-20 PROCEDURE — 99232 SBSQ HOSP IP/OBS MODERATE 35: CPT | Mod: ,,, | Performed by: NURSE PRACTITIONER

## 2024-03-20 PROCEDURE — 25000003 PHARM REV CODE 250

## 2024-03-20 PROCEDURE — 36415 COLL VENOUS BLD VENIPUNCTURE: CPT | Mod: XB | Performed by: STUDENT IN AN ORGANIZED HEALTH CARE EDUCATION/TRAINING PROGRAM

## 2024-03-20 PROCEDURE — 25000003 PHARM REV CODE 250: Performed by: STUDENT IN AN ORGANIZED HEALTH CARE EDUCATION/TRAINING PROGRAM

## 2024-03-20 PROCEDURE — 25000003 PHARM REV CODE 250: Performed by: PHYSICIAN ASSISTANT

## 2024-03-20 PROCEDURE — 85025 COMPLETE CBC W/AUTO DIFF WBC: CPT

## 2024-03-20 PROCEDURE — 63600175 PHARM REV CODE 636 W HCPCS

## 2024-03-20 PROCEDURE — 36415 COLL VENOUS BLD VENIPUNCTURE: CPT

## 2024-03-20 PROCEDURE — A4216 STERILE WATER/SALINE, 10 ML: HCPCS

## 2024-03-20 PROCEDURE — 20600001 HC STEP DOWN PRIVATE ROOM

## 2024-03-20 PROCEDURE — 84300 ASSAY OF URINE SODIUM: CPT

## 2024-03-20 PROCEDURE — 27000207 HC ISOLATION

## 2024-03-20 PROCEDURE — 97530 THERAPEUTIC ACTIVITIES: CPT | Mod: CO

## 2024-03-20 PROCEDURE — 83735 ASSAY OF MAGNESIUM: CPT

## 2024-03-20 PROCEDURE — 85610 PROTHROMBIN TIME: CPT

## 2024-03-20 PROCEDURE — 85730 THROMBOPLASTIN TIME PARTIAL: CPT

## 2024-03-20 PROCEDURE — 82570 ASSAY OF URINE CREATININE: CPT

## 2024-03-20 PROCEDURE — 97535 SELF CARE MNGMENT TRAINING: CPT | Mod: CO

## 2024-03-20 PROCEDURE — 85025 COMPLETE CBC W/AUTO DIFF WBC: CPT | Mod: 91

## 2024-03-20 PROCEDURE — 80053 COMPREHEN METABOLIC PANEL: CPT

## 2024-03-20 PROCEDURE — 94761 N-INVAS EAR/PLS OXIMETRY MLT: CPT

## 2024-03-20 RX ORDER — HEPARIN SODIUM,PORCINE/D5W 25000/250
0-40 INTRAVENOUS SOLUTION INTRAVENOUS CONTINUOUS
Status: DISCONTINUED | OUTPATIENT
Start: 2024-03-20 | End: 2024-03-20

## 2024-03-20 RX ORDER — TRAMADOL HYDROCHLORIDE 50 MG/1
50 TABLET ORAL ONCE
Status: COMPLETED | OUTPATIENT
Start: 2024-03-20 | End: 2024-03-20

## 2024-03-20 RX ORDER — INSULIN ASPART 100 [IU]/ML
2-4 INJECTION, SOLUTION INTRAVENOUS; SUBCUTANEOUS
Status: DISCONTINUED | OUTPATIENT
Start: 2024-03-20 | End: 2024-03-29

## 2024-03-20 RX ORDER — HEPARIN SODIUM,PORCINE/D5W 25000/250
0-40 INTRAVENOUS SOLUTION INTRAVENOUS CONTINUOUS
Status: DISPENSED | OUTPATIENT
Start: 2024-03-20 | End: 2024-03-26

## 2024-03-20 RX ORDER — INSULIN ASPART 100 [IU]/ML
3-5 INJECTION, SOLUTION INTRAVENOUS; SUBCUTANEOUS
Status: DISCONTINUED | OUTPATIENT
Start: 2024-03-20 | End: 2024-03-29

## 2024-03-20 RX ORDER — TRAMADOL HYDROCHLORIDE 50 MG/1
50 TABLET ORAL ONCE
Status: DISCONTINUED | OUTPATIENT
Start: 2024-03-20 | End: 2024-03-20

## 2024-03-20 RX ORDER — INSULIN ASPART 100 [IU]/ML
3-5 INJECTION, SOLUTION INTRAVENOUS; SUBCUTANEOUS
Status: DISCONTINUED | OUTPATIENT
Start: 2024-03-20 | End: 2024-03-23

## 2024-03-20 RX ADMIN — INSULIN ASPART 2 UNITS: 100 INJECTION, SOLUTION INTRAVENOUS; SUBCUTANEOUS at 04:03

## 2024-03-20 RX ADMIN — GABAPENTIN 300 MG: 300 CAPSULE ORAL at 09:03

## 2024-03-20 RX ADMIN — HEPARIN SODIUM AND DEXTROSE 20 UNITS/KG/HR: 10000; 5 INJECTION INTRAVENOUS at 08:03

## 2024-03-20 RX ADMIN — GABAPENTIN 300 MG: 300 CAPSULE ORAL at 08:03

## 2024-03-20 RX ADMIN — PANTOPRAZOLE SODIUM 40 MG: 40 TABLET, DELAYED RELEASE ORAL at 09:03

## 2024-03-20 RX ADMIN — Medication 10 ML: at 06:03

## 2024-03-20 RX ADMIN — SENNOSIDES 8.6 MG: 8.6 TABLET, FILM COATED ORAL at 09:03

## 2024-03-20 RX ADMIN — VORICONAZOLE 200 MG: 200 TABLET, FILM COATED ORAL at 08:03

## 2024-03-20 RX ADMIN — INSULIN ASPART 4 UNITS: 100 INJECTION, SOLUTION INTRAVENOUS; SUBCUTANEOUS at 01:03

## 2024-03-20 RX ADMIN — Medication 10 ML: at 11:03

## 2024-03-20 RX ADMIN — INSULIN ASPART 4 UNITS: 100 INJECTION, SOLUTION INTRAVENOUS; SUBCUTANEOUS at 04:03

## 2024-03-20 RX ADMIN — HEPARIN SODIUM AND DEXTROSE 18 UNITS/KG/HR: 10000; 5 INJECTION INTRAVENOUS at 09:03

## 2024-03-20 RX ADMIN — COLLAGENASE SANTYL: 250 OINTMENT TOPICAL at 09:03

## 2024-03-20 RX ADMIN — Medication 10 ML: at 12:03

## 2024-03-20 RX ADMIN — INSULIN ASPART 3 UNITS: 100 INJECTION, SOLUTION INTRAVENOUS; SUBCUTANEOUS at 09:03

## 2024-03-20 RX ADMIN — ATORVASTATIN CALCIUM 40 MG: 40 TABLET, FILM COATED ORAL at 09:03

## 2024-03-20 RX ADMIN — TAMSULOSIN HYDROCHLORIDE 0.4 MG: 0.4 CAPSULE ORAL at 09:03

## 2024-03-20 RX ADMIN — NIFEDIPINE 90 MG: 30 TABLET, FILM COATED, EXTENDED RELEASE ORAL at 09:03

## 2024-03-20 RX ADMIN — ERTAPENEM 1 G: 1 INJECTION INTRAMUSCULAR; INTRAVENOUS at 04:03

## 2024-03-20 RX ADMIN — CLOPIDOGREL BISULFATE 75 MG: 75 TABLET ORAL at 09:03

## 2024-03-20 RX ADMIN — TRAMADOL HYDROCHLORIDE 50 MG: 50 TABLET, COATED ORAL at 04:03

## 2024-03-20 RX ADMIN — POLYETHYLENE GLYCOL 3350 17 G: 17 POWDER, FOR SOLUTION ORAL at 09:03

## 2024-03-20 RX ADMIN — VORICONAZOLE 200 MG: 200 TABLET, FILM COATED ORAL at 09:03

## 2024-03-20 NOTE — PROGRESS NOTES
[facility] - Telemetry Stepdown  Endocrinology  Progress Note    Admit Date: 3/5/2024     Reason for Consult: Management of T2DM, Hyperglycemia     Diabetes diagnosis year: > 25 years    Home Diabetes Medications:  Novolog 6 units w/ meals and Lantus 45 units nightly (states he is not taking).     How often checking glucose at home?  Not really checking    BG readings on regimen: 200's  Hypoglycemia on the regimen?  Yes (When he was taking the Lantus)  Missed doses on regimen?  Yes    Diabetes Complications include:     Hyperglycemia, Hypoglycemia , Diabetic chronic kidney disease     , Diabetic dermatitis, Foot ulcer  , and Periodontal disease    Complicating diabetes co morbidities:   HTN; HLD      HPI: Saji Castañeda is a 75 yo M with a history of Afib, T2D, HLD, HTN, chronic osteomyelitis of L heel, s/p L TMA, PID, ESBL Klebsiella and Acinetobacter bacteremia who was admitted after being found down at home and DKA. Endocrine consulted to manage hyperglycemia and type 2 diabetes.   Hemoglobin A1C   Date Value Ref Range Status   03/05/2024 >14.0 (H) 4.0 - 5.6 % Final     Comment:     ADA Screening Guidelines:  5.7-6.4%  Consistent with prediabetes  >or=6.5%  Consistent with diabetes    High levels of fetal hemoglobin interfere with the HbA1C  assay. Heterozygous hemoglobin variants (HbS, HgC, etc)do  not significantly interfere with this assay.   However, presence of multiple variants may affect accuracy.     09/13/2023 11.0 (H) 4.5 - 5.7 % Final   03/10/2023 8.4 (H) 4.0 - 5.6 % Final     Comment:     ADA Screening Guidelines:  5.7-6.4%  Consistent with prediabetes  >or=6.5%  Consistent with diabetes    High levels of fetal hemoglobin interfere with the HbA1C  assay. Heterozygous hemoglobin variants (HbS, HgC, etc)do  not significantly interfere with this assay.   However, presence of multiple variants may affect accuracy.     09/17/2022 9.9 (H) 4.0 - 5.6 % Final     Comment:     ADA Screening Guidelines:  5.7-6.4%   "Consistent with prediabetes  >or=6.5%  Consistent with diabetes    High levels of fetal hemoglobin interfere with the HbA1C  assay. Heterozygous hemoglobin variants (HbS, HgC, etc)do  not significantly interfere with this assay.   However, presence of multiple variants may affect accuracy.              Interval HPI:   Overnight events: No acute events overnight. Patient in room 8078/8078 A. Blood glucose stable. BG at goal on current insulin regimen (SSI, prandial, and basal insulin ). Steroid use- None.    Renal function- Abnormal - Creatinine 1.7   Vasopressors-  None       Endocrine will continue to follow and manage insulin orders inpatient.       Diet diabetic  Calorie  Diet NPO Except for: Medication     Eatin%  Nausea: No  Hypoglycemia and intervention: No  Fever: No  TPN and/or TF: No      /71   Pulse 101   Temp 98.3 °F (36.8 °C) (Oral)   Resp 18   Ht 5' 9" (1.753 m)   Wt 120.2 kg (264 lb 15.9 oz)   SpO2 97%   BMI 39.13 kg/m²     Labs Reviewed and Include    Recent Labs   Lab 24  0518   *   CALCIUM 8.6*   ALBUMIN 1.8*   PROT 7.0      K 4.7   CO2 26      BUN 31*   CREATININE 1.7*   ALKPHOS 68   ALT 8*   AST 20   BILITOT 0.3     Lab Results   Component Value Date    WBC 8.44 2024    HGB 7.6 (L) 2024    HCT 24.9 (L) 2024    MCV 80 (L) 2024     (H) 2024     No results for input(s): "TSH", "FREET4" in the last 168 hours.  Lab Results   Component Value Date    HGBA1C >14.0 (H) 2024       Nutritional status:   Body mass index is 39.13 kg/m².  Lab Results   Component Value Date    ALBUMIN 1.8 (L) 2024    ALBUMIN 1.9 (L) 2024    ALBUMIN 1.7 (L) 2024     Lab Results   Component Value Date    PREALBUMIN 17 (L) 2014    PREALBUMIN 16 (L) 2014    PREALBUMIN 19 (L) 2014       Estimated Creatinine Clearance: 48.1 mL/min (A) (based on SCr of 1.7 mg/dL (H)).    Accu-Checks  Recent Labs     " 03/18/24  1714 03/18/24  1733 03/18/24  1746 03/18/24  2050 03/19/24  0226 03/19/24  0806 03/19/24  0843 03/19/24  1218 03/19/24  1703 03/19/24 2053   POCTGLUCOSE 41* 43* 85 88 156* 220* 216* 173* 180* 149*       Current Medications and/or Treatments Impacting Glycemic Control  Immunotherapy:    Immunosuppressants       None          Steroids:   Hormones (From admission, onward)      Start     Stop Route Frequency Ordered    03/05/24 1536  melatonin tablet 6 mg         -- Oral Nightly PRN 03/05/24 1437          Pressors:    Autonomic Drugs (From admission, onward)      None          Hyperglycemia/Diabetes Medications:   Antihyperglycemics (From admission, onward)      Start     Stop Route Frequency Ordered    03/19/24 1645  insulin aspart U-100 pen 4 Units         -- SubQ with dinner 03/19/24 0856    03/19/24 1130  insulin aspart U-100 pen 5 Units         -- SubQ with lunch 03/18/24 1910    03/19/24 0900  insulin detemir U-100 (Levemir) pen 12 Units         -- SubQ 2 times daily 03/18/24 2143    03/19/24 0715  insulin aspart U-100 pen 5 Units         -- SubQ with breakfast 03/18/24 1910    03/11/24 1521  insulin aspart U-100 pen 0-5 Units         -- SubQ Before meals & nightly PRN 03/11/24 1520            ASSESSMENT and PLAN    ID  * Osteomyelitis of left lower extremity  Optimize BG control to improve wound healing        Endocrine  Diabetic ketoacidosis without coma associated with type 2 diabetes mellitus  Resolved.         Insulin dependent type 2 diabetes mellitus  Endocrinology consulted for BG management.   BG goal 140-180    WBD 0.3 units/kg/day  - Levemir (Insulin Detemir) 12 units BID  - Novolog (Insulin Aspart) 3-5 units with breakfast and lunch and 2-4 units with dinner and prn for BG excursions LDC SSI (150/50) (range added due to fluctuating appetite)  - BG checks AC/HS  - Hypoglycemia protocol in place  - If blood glucose greater than 300, please ask patient not to eat food or drink anything other  than water until correctional insulin has brought it back below 250    ** Please notify Endocrine for any change and/or advance in diet**  ** Please call Endocrine for any BG related issues **    Discharge Planning:   TBD. Please notify endocrinology prior to discharge.             Chaka Dominguez, DNP, FNP  Endocrinology  [facility]

## 2024-03-20 NOTE — PLAN OF CARE
Problem: Adult Inpatient Plan of Care  Goal: Plan of Care Review  Outcome: Ongoing, Progressing  Goal: Patient-Specific Goal (Individualized)  Outcome: Ongoing, Progressing  Goal: Absence of Hospital-Acquired Illness or Injury  Outcome: Ongoing, Progressing  Goal: Optimal Comfort and Wellbeing  Outcome: Ongoing, Progressing  Goal: Readiness for Transition of Care  Outcome: Ongoing, Progressing     Problem: Diabetes Comorbidity  Goal: Blood Glucose Level Within Targeted Range  Outcome: Ongoing, Progressing     Problem: Adjustment to Illness (Sepsis/Septic Shock)  Goal: Optimal Coping  Outcome: Ongoing, Progressing     Problem: Glycemic Control Impaired (Sepsis/Septic Shock)  Goal: Blood Glucose Level Within Desired Range  Outcome: Ongoing, Progressing     Problem: Infection Progression (Sepsis/Septic Shock)  Goal: Absence of Infection Signs and Symptoms  Outcome: Ongoing, Progressing     Problem: Nutrition Impaired (Sepsis/Septic Shock)  Goal: Optimal Nutrition Intake  Outcome: Ongoing, Progressing     Problem: Infection  Goal: Absence of Infection Signs and Symptoms  Outcome: Ongoing, Progressing     Problem: Impaired Wound Healing  Goal: Optimal Wound Healing  Outcome: Ongoing, Progressing     Problem: Skin Injury Risk Increased  Goal: Skin Health and Integrity  Outcome: Ongoing, Progressing     Problem: Fall Injury Risk  Goal: Absence of Fall and Fall-Related Injury  Outcome: Ongoing, Progressing     Problem: Fluid and Electrolyte Imbalance (Acute Kidney Injury/Impairment)  Goal: Fluid and Electrolyte Balance  Outcome: Ongoing, Progressing     Problem: Oral Intake Inadequate (Acute Kidney Injury/Impairment)  Goal: Optimal Nutrition Intake  Outcome: Ongoing, Progressing     Problem: Renal Function Impairment (Acute Kidney Injury/Impairment)  Goal: Effective Renal Function  Outcome: Ongoing, Progressing

## 2024-03-20 NOTE — ASSESSMENT & PLAN NOTE
Endocrinology consulted for BG management.   BG goal 140-180    WBD 0.3 units/kg/day  - Levemir (Insulin Detemir) 12 units BID  - Novolog (Insulin Aspart) 3-5 units with breakfast and lunch and 2-4 units with dinner and prn for BG excursions LDC SSI (150/50) (range added due to fluctuating appetite)  - BG checks AC/HS  - Hypoglycemia protocol in place  - If blood glucose greater than 300, please ask patient not to eat food or drink anything other than water until correctional insulin has brought it back below 250    ** Please notify Endocrine for any change and/or advance in diet**  ** Please call Endocrine for any BG related issues **    Discharge Planning:   TBD. Please notify endocrinology prior to discharge.

## 2024-03-20 NOTE — PT/OT/SLP PROGRESS
Physical Therapy      Patient Name:  Saji Castañeda   MRN:  [id number]    PT attempt 1323, however patient not seen today secondary to OT with patient. Writing therapist made second attempt at 1432, however RN actively providing care to patient. Will follow-up at next appropriate date.

## 2024-03-20 NOTE — ASSESSMENT & PLAN NOTE
Patient's FSGs are uncontrolled due to hyperglycemia on current medication regimen.  Last A1c reviewed-   Lab Results   Component Value Date    HGBA1C >14.0 (H) 03/05/2024     Most recent fingerstick glucose reviewed-   Recent Labs   Lab 03/19/24  1218 03/19/24  1703 03/19/24  2053 03/20/24  0810   POCTGLUCOSE 173* 180* 149* 195*       Current correctional scale   LD SSI  Maintain anti-hyperglycemic dose as follows-   Antihyperglycemics (From admission, onward)      Start     Stop Route Frequency Ordered    03/20/24 1645  insulin aspart U-100 pen 2-4 Units         -- SubQ with dinner 03/20/24 0644    03/20/24 1130  insulin aspart U-100 pen 3-5 Units         -- SubQ with lunch 03/20/24 0644    03/20/24 0715  insulin aspart U-100 pen 3-5 Units         -- SubQ with breakfast 03/20/24 0644    03/19/24 0900  insulin detemir U-100 (Levemir) pen 12 Units         -- SubQ 2 times daily 03/18/24 2143    03/11/24 1521  insulin aspart U-100 pen 0-5 Units         -- SubQ Before meals & nightly PRN 03/11/24 1520          Hold Oral hypoglycemics while patient is in the hospital.  Will keep patient on DKA pathway with insulin drip and fluid with protocol in place for switching to subcutaneous insulin as anion gap closes.      DKA  HHS  - Resolved  - Continue insulin regimen and BG checks TIDWM and QHS and 2am  - patient tends to become hypoglycemic around dinnertime  - could be insulin stacking from breakfast and lunch insulin aspart given poor PO intake despite assistance with feeding   - detemir 12 unit BID  - aspart scheduled w/ only breakfast and lunch and dinner with different units  -Inpatient consult to Endocrine

## 2024-03-20 NOTE — ASSESSMENT & PLAN NOTE
Chronic, uncontrolled. Latest blood pressure and vitals reviewed-     Temp:  [97.4 °F (36.3 °C)-99 °F (37.2 °C)]   Pulse:  []   Resp:  [17-22]   BP: (123-153)/(56-71)   SpO2:  [97 %-100 %] .   Home meds for hypertension were reviewed and noted below.   Hypertension Medications               furosemide (LASIX) 40 MG tablet Take 20 mg by mouth.    hydrALAZINE (APRESOLINE) 100 MG tablet Take 1 tablet (100 mg total) by mouth every 8 (eight) hours.    isosorbide dinitrate (ISORDIL) 10 MG tablet Take 1 tablet (10 mg total) by mouth 3 (three) times daily.    NIFEdipine (PROCARDIA-XL) 60 MG (OSM) 24 hr tablet Take 1 tablet (60 mg total) by mouth once daily.            While in the hospital, will manage blood pressure as follows; Adjust home antihypertensive regimen as follows- will keep nifedipine - increased to 90     Will utilize p.r.n. blood pressure medication only if patient's blood pressure greater than 180/110 and he develops symptoms such as worsening chest pain or shortness of breath.

## 2024-03-20 NOTE — SUBJECTIVE & OBJECTIVE
"Past Medical History:   Diagnosis Date    Arthritis     legs    Bacteremia due to Gram-negative bacteria 3/23/2021    Diabetes mellitus     Diabetes mellitus, type 2     Hyperlipidemia     Hypertension     Osteomyelitis     Palliative care encounter 5/24/2023       Past Surgical History:   Procedure Laterality Date    ANGIOGRAPHY OF LOWER EXTREMITY N/A 2/3/2021    Procedure: Angiogram Extremity Bilateral;  Surgeon: Ernst Chacko MD;  Location: [facility] OR [address] [place] [address];  Service: Peripheral Vascular;  Laterality: N/A;  7.4 mintues fluoroscopy time  816.15 mGy  170.17 Gycm2    AORTOGRAPHY WITH EXTREMITY RUNOFF Bilateral 2/3/2021    Procedure: AORTOGRAM, WITH EXTREMITY RUNOFF;  Surgeon: Ernst Chacko MD;  [facility];  Service: Peripheral Vascular;  Laterality: Bilateral;    DEBRIDEMENT OF FOOT Left 2/23/2021    Procedure: DEBRIDEMENT, LEFT HEEL;  Surgeon: Mayra Schroeder DPM;  [facility];  Service: Podiatry;  Laterality: Left;    FOOT AMPUTATION  October 2010    left high midfoot amputation    IMPLANTATION OF LEADLESS PACEMAKER N/A 10/12/2023    Procedure: [id number]-[id number];  Surgeon: VANESSA Delatorre MD;  Location: [facility] EP LAB;  Service: Cardiology;  Laterality: N/A;  AVB, MICRA, EH, [place]ES, [place] [address]       Review of patient's allergies indicates:  No Known Allergies    Medications:  Medications Prior to Admission   Medication Sig    acetaminophen (TYLENOL) 325 MG tablet Take 650 mg by mouth every 6 (six) hours as needed for Temperature greater than.    acidophilus-pectin, citrus 100 million cell-10 mg Cap Take 1 capsule by mouth 2 (two) times a day.    ascorbic acid, vitamin C, (VITAMIN C) 500 MG tablet Take 500 mg by mouth 2 (two) times daily.    B COMPLEX-VITAMIN B12 tablet Take 1 tablet by mouth once daily.    BD AUTOSHIELD DUO PEN NEEDLE 30 gauge x 3/16" Ndle     BD ULTRA-FINE ADITYA PEN NEEDLE 32 gauge x 5/32" Ndle USE FOUR TIMES DAILY WITH " "MEALS AND NIGHTLY    blood sugar diagnostic (CONTOUR TEST STRIPS) Strp Inject 1 strip into the skin 2 (two) times daily before meals.    clopidogreL (PLAVIX) 75 mg tablet Take 1 tablet (75 mg total) by mouth once daily.    gabapentin (NEURONTIN) 300 MG capsule Take 300 mg by mouth 2 (two) times daily.    insulin aspart U-100 (NOVOLOG) 100 unit/mL (3 mL) InPn pen Inject 6 Units into the skin 3 (three) times daily with meals.    MICROLET LANCET Misc     multivitamin (THERAGRAN) per tablet Take 1 tablet by mouth once daily.    NIFEdipine (PROCARDIA-XL) 60 MG (OSM) 24 hr tablet Take 1 tablet (60 mg total) by mouth once daily.    pantoprazole (PROTONIX) 40 MG tablet Take 40 mg by mouth once daily. Before breakfast    pen needle, diabetic 32 gauge x 5/32" Ndle use as directed with insulin    rosuvastatin (CRESTOR) 40 MG Tab Take 40 mg by mouth once daily.    tamsulosin (FLOMAX) 0.4 mg Cap Take 0.4 mg by mouth once daily.    triamcinolone acetonide 0.025% (KENALOG) 0.025 % Oint Apply topically 2 (two) times daily as needed (to affected area).    [DISCONTINUED] apixaban (ELIQUIS) 5 mg Tab Take 1 tablet (5 mg total) by mouth 2 (two) times daily.    [DISCONTINUED] diphenhydrAMINE (BENADRYL) 25 mg capsule No directions listed on the patients medication list.    [DISCONTINUED] furosemide (LASIX) 40 MG tablet Take 20 mg by mouth.    [DISCONTINUED] glipiZIDE (GLUCOTROL) 10 MG tablet Take 20 mg by mouth 2 (two) times a day.    [DISCONTINUED] hydrALAZINE (APRESOLINE) 100 MG tablet Take 1 tablet (100 mg total) by mouth every 8 (eight) hours.    [DISCONTINUED] isosorbide dinitrate (ISORDIL) 10 MG tablet Take 1 tablet (10 mg total) by mouth 3 (three) times daily.    [DISCONTINUED] LANTUS SOLOSTAR U-100 INSULIN glargine 100 units/mL SubQ pen Inject 45 Units into the skin every evening. (Patient taking differently: Inject 45 Units into the skin once daily.)    [DISCONTINUED] miconazole NITRATE 2 % (MICOTIN) 2 % top " powder Apply topically 2 (two) times daily.    [DISCONTINUED] oxyCODONE 5 mg TbOr Take 1 tablet by mouth every 6 (six) hours as needed (Pain (Max 5 daily)).    [DISCONTINUED] sodium hypochlorite 0.5 % (DAKIN'S SOLUTION) external solution Apply topically once daily.     Antibiotics (From admission, onward)      Start     Stop Route Frequency Ordered    03/08/24 1230  ertapenem (INVANZ) 1 g in sodium chloride 0.9 % 100 mL IVPB (MB+)         -- IV Every 24 hours (non-standard times) 03/08/24 1115          Antifungals (From admission, onward)      Start     Stop Route Frequency Ordered    03/15/24 1045  voriconazole tablet 200 mg         -- Oral 2 times daily 03/15/24 0936          Antivirals (From admission, onward)      None             Immunization History   Administered Date(s) Administered    COVID-19, MRNA, LN-S, PF (MODERNA FULL 0.5 ML DOSE) 05/24/2021, 07/13/2021    Influenza - Trivalent (ADULT) 10/08/2020    PPD Test 12/09/2020, 03/02/2021, 06/21/2022, 03/18/2024    Pneumococcal Conjugate - 13 Valent 10/16/2018       Family History       Problem Relation (Age of Onset)    Cancer Brother    Diabetes Mother, Sister    Heart disease Mother    Stroke Sister          Social History     Socioeconomic History    Marital status: Single   Tobacco Use    Smoking status: Never    Smokeless tobacco: Never   Substance and Sexual Activity    Alcohol use: No     Comment: occassional    Drug use: No    Sexual activity: Not Currently   Social History Narrative    Not currently working; lives with family     Social Determinants of Health     Financial Resource Strain: Medium Risk (3/5/2024)    Overall Financial Resource Strain (CARDIA)     Difficulty of Paying Living Expenses: Somewhat hard   Food Insecurity: No Food Insecurity (3/5/2024)    Hunger Vital Sign     Worried About Running Out of Food in the Last Year: Never true     Ran Out of Food in the Last Year: Never true   Transportation Needs: No Transportation  Needs (3/5/2024)    PRAPARE - Transportation     Lack of Transportation (Medical): No     Lack of Transportation (Non-Medical): No   Physical Activity: Inactive (3/5/2024)    Exercise Vital Sign     Days of Exercise per Week: 0 days     Minutes of Exercise per Session: 0 min   Stress: No Stress Concern Present (3/5/2024)    [employer] [place] [employer] - Occupational Stress Questionnaire     Feeling of Stress : Only a little   Social Connections: Socially Isolated (3/5/2024)    Social Connection and Isolation Panel [NHANES]     Frequency of Communication with Friends and Family: More than three times a week     Frequency of Social Gatherings with Friends and Family: Patient declined     Attends Shinto Services: Never     Active Member of Clubs or Organizations: No     Attends Club or Organization Meetings: Never     Marital Status: Never    Housing Stability: Low Risk  (3/5/2024)    Housing Stability Vital Sign     Unable to Pay for Housing in the Last Year: No     Number of Places Lived in the Last Year: 1     Unstable Housing in the Last Year: No     Review of Systems   Constitutional:  Negative for chills, diaphoresis and fever.   Respiratory:  Negative for shortness of breath.    Cardiovascular:  Negative for chest pain.   Gastrointestinal:  Negative for abdominal pain, diarrhea, nausea and vomiting.   Genitourinary:  Negative for dysuria and hematuria.   Musculoskeletal:  Positive for myalgias.   Skin:  Positive for color change and wound.     Objective:     Vital Signs (Most Recent):  Temp: 98.2 °F (36.8 °C) (03/20/24 1128)  Pulse: 81 (03/20/24 1128)  Resp: (!) 22 (03/20/24 1128)  BP: (!) 153/59 (03/20/24 1128)  SpO2: 100 % (03/20/24 1128) Vital Signs (24h Range):  Temp:  [97.4 °F (36.3 °C)-98.3 °F (36.8 °C)] 98.2 °F (36.8 °C)  Pulse:  [] 81  Resp:  [17-22] 22  SpO2:  [97 %-100 %] 100 %  BP: (123-153)/(57-71) 153/59     Weight: 120.2 kg (264 lb 15.9 oz)  Body mass  index is 39.13 kg/m².    Estimated Creatinine Clearance: 48.1 mL/min (A) (based on SCr of 1.7 mg/dL (H)).     Physical Exam  Constitutional:       General: He is not in acute distress.     Appearance: He is well-developed. He is ill-appearing. He is not toxic-appearing or diaphoretic.       HENT:      Head: Normocephalic and atraumatic.   Cardiovascular:      Rate and Rhythm: Normal rate and regular rhythm.      Heart sounds: Normal heart sounds. No murmur heard.     No friction rub. No gallop.   Pulmonary:      Effort: Pulmonary effort is normal. No respiratory distress.      Breath sounds: Normal breath sounds. No wheezing or rales.   Abdominal:      General: Bowel sounds are normal. There is no distension.      Palpations: Abdomen is soft. There is no mass.      Tenderness: There is no abdominal tenderness. There is no guarding or rebound.   Skin:     General: Skin is warm and dry.   Neurological:      Mental Status: He is alert and oriented to person, place, and time.   Psychiatric:         Behavior: Behavior normal.        Significant Labs: Blood Culture:   Recent Labs   Lab 10/02/23  1701 10/06/23  1131 10/06/23  1133 03/05/24  0800 03/19/24  1214   LABBLOO Gram stain aer bottle: Gram negative rods  Results called to and read back by:Alejandra Figueroa RN 10/03/2023  06:29  Gram stain jumana bottle:Gram positive cocci in clusters resembling Staph  Results called to and read back by:Mauro Prado Rn 10/07/2023  00:39  Reviewed by PhD Supervisor, Supervisor, or designee 10/04/2023  13:53  ACINETOBACTER BAUMANNII   further identified as Acinetobacter baumannii nosocomialis group  For susceptibility see order #[id number]  *  COAGULASE-NEGATIVE STAPHYLOCOCCUS SPECIES  Organism is a probable contaminant  *  Gram stain aer bottle: Gram negative rods  Gram stain jumana bottle: Gram negative rods  Results called to and read back by:Alejandra Figueroa RN 10/03/2023  06:29  Gram stain jumana bottle: Gram positive  cocci in clusters resembling Staph  Results called to and read back by: Ricky Munguia RN 10/05/2023  06:53  KLEBSIELLA PNEUMONIAE ESBL*  ACINETOBACTER BAUMANNII   further identified as Acinetobacter baumannii nosocomialis group  * No growth after 5 days. No growth after 5 days. No growth after 5 days.  No growth after 5 days. No Growth to date  No Growth to date  No Growth to date  No Growth to date     CBC:   Recent Labs   Lab 03/19/24  0752 03/20/24  0518 03/20/24  0833   WBC 11.67 8.44 8.60   HGB 8.2* 7.6* 7.6*   HCT 27.3* 24.9* 25.1*   * 561* 556*     CMP:   Recent Labs   Lab 03/19/24  0752 03/20/24  0518    141   K 4.9 4.7    105   CO2 28 26   * 155*   BUN 28* 31*   CREATININE 1.8* 1.7*   CALCIUM 8.5* 8.6*   PROT  --  7.0   ALBUMIN  --  1.8*   BILITOT  --  0.3   ALKPHOS  --  68   AST  --  20   ALT  --  8*   ANIONGAP 8 10     Wound Culture:   Recent Labs   Lab 10/03/23  1216 03/06/24  1439   LABAERO FERNANDO ALBICANS  Few  *  ACHROMOBACTER XYLOSOXIDANS SUBSP. DENTRIFICANS  Rare  Skin bossman also present  * PROTEUS MIRABILIS  Few  *  ENTEROBACTER CLOACAE  Few  Skin bossman also present  *     All pertinent labs within the past 24 hours have been reviewed.    Significant Imaging: I have reviewed all pertinent imaging results/findings within the past 24 hours.  US Retroperitoneal Complete [[id number]] Resulted: 03/20/24 1541   Order Status: Completed Updated: 03/20/24 1544   Narrative:     EXAMINATION:  US RETROPERITONEAL COMPLETE    CLINICAL HISTORY:  YNES with urinary retention s/p fung's;    TECHNIQUE:  Ultrasound of the kidneys and urinary bladder was performed including color flow and Doppler evaluation of the kidneys.    COMPARISON:  Retroperitoneal ultrasound 10/04/2023    FINDINGS:  Right kidney: The right kidney measures 12.0 cm. No cortical thinning. No loss of corticomedullary distinction. Resistive index measures 1.0.  No mass. No renal stone. No  hydronephrosis.    Left kidney: The left kidney measures 12.4 cm. No cortical thinning. No loss of corticomedullary distinction. Resistive index measures 1.0.  2.1 cm simple cyst in the upper pole.  No solid mass.  No renal stone. No hydronephrosis.    Bladder is decompressed around a Sams catheter.    Spleen resistive index measures 0.84.   Impression:       Elevated arterial resistive indices in both kidneys, which can be seen with medical renal disease.    No hydronephrosis.      Electronically signed by: Rishi Ramos  Date: 03/20/2024  Time: 15:41   X-Ray Chest AP Portable [[id number]] Resulted: 03/19/24 1339   Order Status: Completed Updated: 03/19/24 1342   Narrative:     EXAMINATION:  XR CHEST AP PORTABLE    CLINICAL HISTORY:  ruleout pneumonia;    TECHNIQUE:  One view    COMPARISON:  Comparison is made to 03/05/2024.    FINDINGS:  Heart size and the appearance of the cardiomediastinal silhouette are unchanged since the examination referenced above.  Once again there is a relatively poor inspiratory depth level, but the lung zones appear stable, with no significant new areas of airspace consolidation or volume loss evident.  No pleural fluid of any substantial volume is seen on either side.  No pneumothorax.  A loop recorder is superimposed over the medial aspect of the lower hemithorax on the left.   Impression:       No significant detrimental interval change in the appearance of the chest since 03/05/2024 is appreciated.      Electronically signed by: Jaylan English MD  Date: 03/19/2024  Time: 13:39   US Upper Extremity Veins Left [[id number]] (Abnormal) Resulted: 03/14/24 1156   Order Status: Completed Updated: 03/14/24 1158   Narrative:     EXAMINATION:  US UPPER EXTREMITY VEINS RIGHT; US UPPER EXTREMITY VEINS LEFT    CLINICAL HISTORY:  r/o dvt;; arm swollen, painful;    TECHNIQUE:  Duplex and color flow Doppler evaluation and dynamic compression was performed of the right and left upper extremity  veins.    COMPARISON:  None    FINDINGS:  Right central veins: The internal jugular, subclavian, and axillary veins are patent and free of thrombus.    Right arm veins: Thrombosis of the brachial vein surrounding venous line.  The basilic and cephalic veins are patent and compressible.    Left central veins: The internal jugular, subclavian, and axillary veins are patent and free of thrombus.    Left arm veins: The brachial, basilic, and cephalic veins are patent and compressible.  Line noted within the brachial vein.       Impression:       Deep venous thrombosis of the right brachial vein surrounding venous line.    This report was flagged in Epic as abnormal.    Finding was relayed to Dr. Collins on 03/14/2024 at 11:46.      Electronically signed by: Edwin Aparicio  Date: 03/14/2024  Time: 11:56   US Upper Extremity Veins Right [[id number]] (Abnormal) Resulted: 03/14/24 1156   Order Status: Completed Updated: 03/14/24 1158   Narrative:     EXAMINATION:  US UPPER EXTREMITY VEINS RIGHT; US UPPER EXTREMITY VEINS LEFT    CLINICAL HISTORY:  r/o dvt;; arm swollen, painful;    TECHNIQUE:  Duplex and color flow Doppler evaluation and dynamic compression was performed of the right and left upper extremity veins.    COMPARISON:  None    FINDINGS:  Right central veins: The internal jugular, subclavian, and axillary veins are patent and free of thrombus.    Right arm veins: Thrombosis of the brachial vein surrounding venous line.  The basilic and cephalic veins are patent and compressible.    Left central veins: The internal jugular, subclavian, and axillary veins are patent and free of thrombus.    Left arm veins: The brachial, basilic, and cephalic veins are patent and compressible.  Line noted within the brachial vein.       Impression:       Deep venous thrombosis of the right brachial vein surrounding venous line.    This report was flagged in Epic as abnormal.    Finding was relayed to Dr. Collins on 03/14/2024 at  11:46.      Electronically signed by: Edwin Aparicio  Date: 03/14/2024  Time: 11:56     Imaging History    2024      Date Procedure Name Study Review Link PACS Link Status Accession Number Location   03/20/24 03:37 PM US Retroperitoneal Complete Study Review  [facility] [id number] [facility]   03/19/24 01:08 PM X-Ray Chest AP Portable Study Review  Images Final [id number] [facility]   03/14/24 11:41 AM US Upper Extremity Veins Left Study Review  Images Final [id number] [facility]   03/14/24 11:40 AM US Upper Extremity Veins Right Study Review  Images Final [id number] [facility]   03/05/24 09:45 AM X-Ray Chest AP Portable Study Review  Images Final [id number] [facility]   03/20/24 01:22 AM CARDIAC MONITORING STRIPS Study Review  Final     03/19/24 04:33 PM CARDIAC MONITORING STRIPS Study Review  Final     03/19/24 04:33 PM CARDIAC MONITORING STRIPS Study Review  Final     03/19/24 04:33 PM CARDIAC MONITORING STRIPS Study Review  Final     03/19/24 04:33 PM CARDIAC MONITORING STRIPS Study Review  Final     03/19/24 04:33 PM CARDIAC MONITORING STRIPS Study Review  Final     03/19/24 04:33 PM CARDIAC MONITORING STRIPS Study Review  Final     03/19/24 04:33 PM CARDIAC MONITORING STRIPS Study Review  Final     03/19/24 04:33 PM CARDIAC MONITORING STRIPS Study Review  Final     03/19/24 04:33 PM CARDIAC MONITORING STRIPS Study Review  Final     03/19/24 04:33 PM CARDIAC MONITORING STRIPS Study Review  Final     03/19/24 04:33 PM CARDIAC MONITORING STRIPS Study Review  Final     03/19/24 04:33 PM CARDIAC MONITORING STRIPS Study Review  Final     03/18/24 11:25 PM CARDIAC MONITORING STRIPS Study Review  Final     03/14/24 06:10 AM CARDIAC MONITORING STRIPS Study Review  Final     03/13/24 06:28 AM CARDIAC MONITORING STRIPS Study Review  Final     03/12/24 06:04 PM CARDIAC MONITORING STRIPS Study Review  Final     03/12/24 06:06 AM CARDIAC MONITORING STRIPS Study Review  Final     03/11/24 06:22 PM CARDIAC MONITORING STRIPS Study Review  Final     03/11/24  05:42 AM CARDIAC MONITORING STRIPS Study Review  Final

## 2024-03-20 NOTE — ASSESSMENT & PLAN NOTE
Patient with acute kidney injury/acute renal failure likely due to pre-renal azotemia due to dehydration YNES is currently worsening. Baseline creatinine  1.1  - Labs reviewed- Renal function/electrolytes with Estimated Creatinine Clearance: 48.1 mL/min (A) (based on SCr of 1.7 mg/dL (H)). according to latest data. Monitor urine output and serial BMP and adjust therapy as needed. Avoid nephrotoxins and renally dose meds for GFR listed above.    Creatinine 2.1 on admit, baseline around 1.1  - Likely pre-renal from poor PO intake    Lab Results   Component Value Date    CREATININE 1.7 (H) 03/20/2024       Plan:   - giving 1 liter of LR  - Check urine lytes urine protein creatinine ratio if YNES does not improve w/ fluids  - Strict I&Os and daily weights   - Daily BMP  - Trend sCr  - Avoid nephrotoxic agents such as NSAIDs, ACEi, ARBs and IV radiocontrast.  - Renally dose meds to current GFR.  - Maintain MAP > 65.  - No indications for HD at this time.   - Maintain electrolytes at goal: Mg > 2, Phos > 3, K > 4.  -US retroperitoneal

## 2024-03-20 NOTE — TELEPHONE ENCOUNTER
----- Message from Marisol Najera MD sent at 3/19/2024  6:00 PM CDT -----  Hi,    This patient needs outpatient f/u with an EVELINA for a voiding trial in 7-10 days.     ThanksMarisol

## 2024-03-20 NOTE — ASSESSMENT & PLAN NOTE
Patient with Persistent (7 days or more) atrial fibrillation which is uncontrolled currently with    . Patient is currently in sinus rhythm.[id number] Score: 4. . Anticoagulation indicated. Anticoagulation done with lovenox as we could not get IV access for heparin  .    -Eliquis transitioned to Heparin in preparation for AKA.

## 2024-03-20 NOTE — PT/OT/SLP PROGRESS
Occupational Therapy   Treatment    Name: Saji Castañeda  MRN: [id number]  Admitting Diagnosis:  Osteomyelitis of left lower extremity       Recommendations:     Discharge Recommendations: Moderate Intensity Therapy  Discharge Equipment Recommendations:  lift device, hospital bed  Barriers to discharge:  Other (Comment) (increased skilled assistance required for ADLs and mobility)    Assessment:     Saji Castañeda is a 74 y.o. male with a medical diagnosis of Osteomyelitis of left lower extremity.  He presents with the following performance deficits affecting function are weakness, impaired functional mobility, decreased safety awareness, pain, impaired endurance, gait instability, impaired balance, impaired self care skills, decreased lower extremity function, decreased ROM, abnormal tone, edema, impaired skin.   Pt requires significant assistance for bed mobility, natalie-hygiene, LB dressing x 2 persons. Pt became very agitated during session and used profanity while seated EOB prior to attempting sit>stand trial, which pt unable to successfully perform as appeared to demonstrate little to no effort.  He demonstrated ability to sit EOB ~ 5 minutes requiring little assistance and verbal cues to weight shift upright as he laterally leans.  Rehab Prognosis:  Poor; patient would benefit from acute skilled OT services to address these deficits and reach maximum level of function.       Plan:     Patient to be seen 2 x/week to address the above listed problems via self-care/home management, therapeutic activities, therapeutic exercises, neuromuscular re-education  Plan of Care Expires: 04/06/24  Plan of Care Reviewed with: patient    Subjective     Chief Complaint: L LE pain  Patient/Family Comments/goals: I can't do this shit, pt states referring to standing trial  Pain/Comfort:  Pain Rating 1: 8/10  Location - Side 1: Left  Location - Orientation 1: generalized  Location 1: leg  Pain Addressed 1: Reposition, Distraction,  Cessation of Activity, Nurse notified    Objective:     Communicated with: nurseMarion prior to session.  Patient found supine with telemetry, pulse ox (continuous), peripheral IV, fung catheter (B LE's wrapped in ace bandage) upon OT entry to room.    General Precautions: Standard, fall, contact    Orthopedic Precautions:LLE non weight bearing, RLE weight bearing as tolerated  Braces:  (Darco shoe R LE)  Respiratory Status: Room air     Occupational Performance:     Bed Mobility:    Patient completed Rolling/Turning to Left with  maximal assistance  Patient completed Rolling/Turning to Right with maximal assistance  Patient completed Scooting/Bridging with maximal assistance, 2 persons, and draw sheet  Patient completed Supine to Sit with maximal assistance, 2 persons, with side rail, and management of B LEs  Patient completed Sit to Supine with maximal assistance, 2 persons, and management of B LEs      Functional Mobility/Transfers:  Attempted to perform one trial of sit > stand with RW from EOB and Total A x 2 persons.  Pt  demonstrated no effort during attempt  Pt re-educated and instructed on NWB L LE and WBAT R LE      Activities of Daily Living:  Upper Body Dressing: total assistance to doff soiled gown while seated EOB; Max A to don clean gown seated EOB and management of IV line  Lower Body Dressing: total assistance to don and doff Darco shoe on R foot  Toileting: In L side lying, pt performed having a bowel movement with total assistance x 2 persons (nurse and therapist) and management of gown     Nurse present in room to assess skin integrity and provide wound care during ADLs.      [facility] 6 Click ADL: 12    Treatment & Education:  Pt performed bed mobility, functional mobility/transfers, and ADLs as documented above.   Pt educated and instructed on the following:  OT POC  Role of TAYLOR  Use of call bell for all assistance  Emphasize the importance of sitting up EOB 2/2 impaired skin integrity,  improve bed mobility, and sitting tolerance  NWB precaution  L LE, WBAT R LE  Addressed questions and /or concerns within TAYLOR scope of practice  Pt verbalized understanding     Patient left left sidelying with purple wedge with all lines intact, call button in reach, and nurse (Marion) notified    GOALS:   Multidisciplinary Problems       Occupational Therapy Goals          Problem: Occupational Therapy    Goal Priority Disciplines Outcome Interventions   Occupational Therapy Goal     OT, PT/OT Ongoing, Progressing    Description: Goals to be met by: 4/6/24     Patient will increase functional independence with ADLs by performing:    UE Dressing with Contact Guard Assistance.  LE Dressing with Maximum Assistance.  Grooming while EOB with Stand-by Assistance.  Toileting from bedside commode with Maximum Assistance for hygiene and clothing management.   Sitting at edge of bed x5 minutes with Stand-by Assistance.  Rolling to Bilateral with Minimal Assistance.   Supine to sit with Minimal Assistance.  Stand pivot transfers with Minimal Assistance.  Toilet transfer to bedside commode with Maximum Assistance.  BUE strengthening exercise program 2x day with theraband/HEP worksheet                         Time Tracking:     OT Date of Treatment: 03/20/24  OT Start Time: 1324  OT Stop Time: 1359  OT Total Time (min): 35 min    Billable Minutes:Self Care/Home Management 15  Therapeutic Activity 20    OT/KATHLEEN: KATHLEEN     Number of KATHLEEN visits since last OT visit: 2    3/20/2024

## 2024-03-20 NOTE — SUBJECTIVE & OBJECTIVE
Interval History: Overnight patient had developed urinary retention and fung's was placed. Vascular surgery planning on AKA on 3/27 and eliquis switched to Lovenox.        Objective:     Vital Signs (Most Recent):  Temp: 98.2 °F (36.8 °C) (03/20/24 1128)  Pulse: 81 (03/20/24 1128)  Resp: (!) 22 (03/20/24 1128)  BP: (!) 153/59 (03/20/24 1128)  SpO2: 100 % (03/20/24 1128) Vital Signs (24h Range):  Temp:  [97.4 °F (36.3 °C)-99 °F (37.2 °C)] 98.2 °F (36.8 °C)  Pulse:  [] 81  Resp:  [17-22] 22  SpO2:  [97 %-100 %] 100 %  BP: (123-153)/(56-71) 153/59     Weight: 120.2 kg (264 lb 15.9 oz)  Body mass index is 39.13 kg/m².    Intake/Output Summary (Last 24 hours) at 3/20/2024 1154  Last data filed at 3/20/2024 1048  Gross per 24 hour   Intake 50 ml   Output 1650 ml   Net -1600 ml         Physical Exam      Constitutional:       General: He is not in acute distress.     Appearance: Normal appearance. He is well-developed. He is ill-appearing . He is not diaphoretic but lethargic  HENT:      Head: Normocephalic and atraumatic.      Right Ear: External ear normal.      Left Ear: External ear normal.      Nose: Nose normal.   Eyes:      General: No scleral icterus.        Right eye: No discharge.         Left eye: No discharge.      Conjunctiva/sclera: Conjunctivae normal.   Pulmonary:      Effort: Pulmonary effort is normal. No respiratory distress.      Breath sounds: No stridor.   Skin:     General: Skin is dry.      Comments: Bruising, lesion and rash present.      LE swelling B/L with thick discolored skin. Wound on plantar L footWounds of legs, back, buttocks   Neurological:      General: No focal deficit present.      Mental Status: He is alert and oriented to person, place, and time. Mental status is at baseline.   Psychiatric:         Mood and Affect: Mood normal.         Behavior: Behavior normal.         Thought Content: Thought content normal.         Judgment: Judgment normal.   Significant Labs: All  pertinent labs within the past 24 hours have been reviewed.    Significant Imaging: I have reviewed all pertinent imaging results/findings within the past 24 hours.   Skin normal color for race, warm, dry and intact. No evidence of rash.

## 2024-03-20 NOTE — ASSESSMENT & PLAN NOTE
Patient's FSGs are uncontrolled due to hyperglycemia on current medication regimen.  Last A1c reviewed-   Lab Results   Component Value Date    HGBA1C >14.0 (H) 03/05/2024     Most recent fingerstick glucose reviewed-   Recent Labs   Lab 03/19/24  1218 03/19/24  1703 03/19/24  2053 03/20/24  0810   POCTGLUCOSE 173* 180* 149* 195*     Current correctional scale  Low  Increase anti-hyperglycemic dose as follows-   Antihyperglycemics (From admission, onward)      Start     Stop Route Frequency Ordered    03/20/24 1645  insulin aspart U-100 pen 2-4 Units         -- SubQ with dinner 03/20/24 0644    03/20/24 1130  insulin aspart U-100 pen 3-5 Units         -- SubQ with lunch 03/20/24 0644    03/20/24 0715  insulin aspart U-100 pen 3-5 Units         -- SubQ with breakfast 03/20/24 0644    03/19/24 0900  insulin detemir U-100 (Levemir) pen 12 Units         -- SubQ 2 times daily 03/18/24 2143    03/11/24 1521  insulin aspart U-100 pen 0-5 Units         -- SubQ Before meals & nightly PRN 03/11/24 1520          Hold Oral hypoglycemics while patient is in the hospital.

## 2024-03-20 NOTE — CONSULTS
[facility] - Telemetry Stepdown  Endocrinology  Diabetes Consult Note    Consult Requested by: Jm Rosa MD   Reason for admit: Osteomyelitis of left lower extremity    HISTORY OF PRESENT ILLNESS:  Reason for Consult: Management of T2DM, Hyperglycemia     Diabetes diagnosis year: > 25 years    Home Diabetes Medications:  Novolog 6 units w/ meals and Lantus 45 units nightly (states he is not taking).     How often checking glucose at home?  Not really checking    BG readings on regimen: 200's  Hypoglycemia on the regimen?  Yes (When he was taking the Lantus)  Missed doses on regimen?  Yes    Diabetes Complications include:     Hyperglycemia, Hypoglycemia , Diabetic chronic kidney disease     , Diabetic dermatitis, Foot ulcer  , and Periodontal disease    Complicating diabetes co morbidities:   HTN; HLD      HPI: Saji Castañdea is a 75 yo M with a history of Afib, T2D, HLD, HTN, chronic osteomyelitis of L heel, s/p L TMA, PID, ESBL Klebsiella and Acinetobacter bacteremia who was admitted after being found down at home and DKA. Endocrine consulted to manage hyperglycemia and type 2 diabetes.   Hemoglobin A1C   Date Value Ref Range Status   03/05/2024 >14.0 (H) 4.0 - 5.6 % Final     Comment:     ADA Screening Guidelines:  5.7-6.4%  Consistent with prediabetes  >or=6.5%  Consistent with diabetes    High levels of fetal hemoglobin interfere with the HbA1C  assay. Heterozygous hemoglobin variants (HbS, HgC, etc)do  not significantly interfere with this assay.   However, presence of multiple variants may affect accuracy.     09/13/2023 11.0 (H) 4.5 - 5.7 % Final   03/10/2023 8.4 (H) 4.0 - 5.6 % Final     Comment:     ADA Screening Guidelines:  5.7-6.4%  Consistent with prediabetes  >or=6.5%  Consistent with diabetes    High levels of fetal hemoglobin interfere with the HbA1C  assay. Heterozygous hemoglobin variants (HbS, HgC, etc)do  not significantly interfere with this assay.   However, presence of multiple variants  may affect accuracy.     2022 9.9 (H) 4.0 - 5.6 % Final     Comment:     ADA Screening Guidelines:  5.7-6.4%  Consistent with prediabetes  >or=6.5%  Consistent with diabetes    High levels of fetal hemoglobin interfere with the HbA1C  assay. Heterozygous hemoglobin variants (HbS, HgC, etc)do  not significantly interfere with this assay.   However, presence of multiple variants may affect accuracy.              Interval HPI:   Overnight events: No acute events overnight. Patient in room 8078/8078 A. Blood glucose  variable . BG at, above, and below goal on current insulin regimen (SSI, prandial, and basal insulin ). Steroid use- None.    Renal function- Abnormal - Creatinine 1.8   Vasopressors-  None       Endocrine will continue to follow and manage insulin orders inpatient.       Diet diabetic 2000 Calorie  Diet NPO Except for: Medication     Eatin%  Nausea: No  Hypoglycemia and intervention: Yes, multiple hypoglycemic events, primarily due to over basal insulin dosing.   Fever: No  TPN and/or TF: No    PMH, PSH, FH, SH updated and reviewed     ROS:  Review of Systems   Constitutional:  Positive for unexpected weight change.   Eyes:  Positive for visual disturbance.   Respiratory:  Negative for cough.    Cardiovascular:  Negative for chest pain.   Gastrointestinal:  Positive for nausea and vomiting.   Endocrine: Positive for polydipsia and polyuria.   Musculoskeletal:  Negative for back pain.   Skin:  Negative for rash.   Neurological:  Negative for syncope.   Psychiatric/Behavioral:  Negative for agitation and dysphoric mood.        Current Medications and/or Treatments Impacting Glycemic Control  Immunotherapy:    Immunosuppressants       None          Steroids:   Hormones (From admission, onward)      Start     Stop Route Frequency Ordered    24 1536  melatonin tablet 6 mg         -- Oral Nightly PRN 24 1437          Pressors:    Autonomic Drugs (From admission, onward)      None       "    Hyperglycemia/Diabetes Medications:   Antihyperglycemics (From admission, onward)      Start     Stop Route Frequency Ordered    03/19/24 1645  insulin aspart U-100 pen 4 Units         -- SubQ with dinner 03/19/24 0856    03/19/24 1130  insulin aspart U-100 pen 5 Units         -- SubQ with lunch 03/18/24 1910    03/19/24 0900  insulin detemir U-100 (Levemir) pen 12 Units         -- SubQ 2 times daily 03/18/24 2143    03/19/24 0715  insulin aspart U-100 pen 5 Units         -- SubQ with breakfast 03/18/24 1910 03/11/24 1521  insulin aspart U-100 pen 0-5 Units         -- SubQ Before meals & nightly PRN 03/11/24 1520             PHYSICAL EXAMINATION:  Vitals:    03/19/24 1513   BP: (!) 146/56   Pulse: 82   Resp: 18   Temp: 98.9 °F (37.2 °C)     Body mass index is 39.13 kg/m².     Physical Exam  Constitutional:       Appearance: He is well-developed.   HENT:      Head: Normocephalic.   Eyes:      Conjunctiva/sclera: Conjunctivae normal.   Pulmonary:      Effort: Pulmonary effort is normal.   Musculoskeletal:         General: Normal range of motion.   Skin:     General: Skin is warm.      Findings: No rash.   Neurological:      Mental Status: He is alert and oriented to person, place, and time.            Labs Reviewed and Include   Recent Labs   Lab 03/19/24  0752   *   CALCIUM 8.5*      K 4.9   CO2 28      BUN 28*   CREATININE 1.8*     Lab Results   Component Value Date    WBC 11.67 03/19/2024    HGB 8.2 (L) 03/19/2024    HCT 27.3 (L) 03/19/2024    MCV 81 (L) 03/19/2024     (H) 03/19/2024     No results for input(s): "TSH", "FREET4" in the last 168 hours.  Lab Results   Component Value Date    HGBA1C >14.0 (H) 03/05/2024       Nutritional status:   Body mass index is 39.13 kg/m².  Lab Results   Component Value Date    ALBUMIN 1.9 (L) 03/18/2024    ALBUMIN 1.7 (L) 03/16/2024    ALBUMIN 1.5 (L) 03/14/2024     Lab Results   Component Value Date    PREALBUMIN 17 (L) 09/04/2014    PREALBUMIN " 16 (L) 07/24/2014    PREALBUMIN 19 (L) 02/18/2014       Estimated Creatinine Clearance: 45.4 mL/min (A) (based on SCr of 1.8 mg/dL (H)).    Accu-Checks  Recent Labs     03/18/24  1212 03/18/24  1714 03/18/24  1733 03/18/24  1746 03/18/24  2050 03/19/24  0226 03/19/24  0806 03/19/24  0843 03/19/24  1218 03/19/24  1703   POCTGLUCOSE 145* 41* 43* 85 88 156* 220* 216* 173* 180*        ASSESSMENT and PLAN    ID  * Osteomyelitis of left lower extremity  Optimize BG control to improve wound healing        Endocrine  Diabetic ketoacidosis without coma associated with type 2 diabetes mellitus  Resolved.         Insulin dependent type 2 diabetes mellitus  Endocrinology consulted for BG management.   BG goal 140-180    WBD 0.3 units/kg/day  - Levemir (Insulin Detemir) 12 units BID  - Novolog (Insulin Aspart) 5 units with breakfast and lunch and 4 units with dinner and prn for BG excursions LDC SSI (150/50)  - BG checks AC/HS  - Hypoglycemia protocol in place  - If blood glucose greater than 300, please ask patient not to eat food or drink anything other than water until correctional insulin has brought it back below 250    ** Please notify Endocrine for any change and/or advance in diet**  ** Please call Endocrine for any BG related issues **    Discharge Planning:   TBD. Please notify endocrinology prior to discharge.            Plan discussed with patient, family, and RN at bedside.          Chaka Dominguez, DNP, FNP  Endocrinology  [facility] - Telemetry Stepdown

## 2024-03-20 NOTE — ASSESSMENT & PLAN NOTE
Patient with Proteus and enterobacter on leg cultures collected by Podiatry with concern for acute on chronic osteo. Fungal culture grew fusarium. He has started spiking fever and still is not agreeable to amputation, will do infectious work up to look out for source.    -UA reflex to culture  -CXR   -Blood culture  -Inpatient consult to ID and vascular surgery who plans to do AKA on 3/27.  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

## 2024-03-20 NOTE — PLAN OF CARE
SW updated [facility] regarding Pt's amputation surgery next week, SW uploaded updates for them.     PT/OT will re-eval post surgery.     VANESSA Lewis, LORNA  [facility]  [id number]

## 2024-03-20 NOTE — PLAN OF CARE
Pt remained NPO from midnight. No any s/s of distress noted or pain reported. Safety measures in place. The call light is within reach. Pt care ongoing.     Problem: Adult Inpatient Plan of Care  Goal: Absence of Hospital-Acquired Illness or Injury  Intervention: Identify and Manage Fall Risk  Flowsheets (Taken 3/20/2024 0526)  Safety Promotion/Fall Prevention:   assistive device/personal item within reach   bed alarm set   side rails raised x 2  Intervention: Prevent Skin Injury  Flowsheets (Taken 3/20/2024 0526)  Body Position: weight shifting  Skin Protection: adhesive use limited  Intervention: Prevent and Manage VTE (Venous Thromboembolism) Risk  Flowsheets (Taken 3/20/2024 0526)  Activity Management:   Rolling - L1   Arm raise - L1  VTE Prevention/Management:   bleeding precations maintained   bleeding risk assessed  Intervention: Prevent Infection  Flowsheets (Taken 3/20/2024 0526)  Infection Prevention:   environmental surveillance performed   rest/sleep promoted  Goal: Optimal Comfort and Wellbeing  Intervention: Provide Person-Centered Care  Flowsheets (Taken 3/20/2024 0526)  Trust Relationship/Rapport:   care explained   choices provided   emotional support provided   empathic listening provided   thoughts/feelings acknowledged

## 2024-03-20 NOTE — SUBJECTIVE & OBJECTIVE
"Interval HPI:   Overnight events: No acute events overnight. Patient in room 8078/8078 A. Blood glucose stable. BG at goal on current insulin regimen (SSI, prandial, and basal insulin ). Steroid use- None.    Renal function- Abnormal - Creatinine 1.7   Vasopressors-  None       Endocrine will continue to follow and manage insulin orders inpatient.       Diet diabetic  Calorie  Diet NPO Except for: Medication     Eatin%  Nausea: No  Hypoglycemia and intervention: No  Fever: No  TPN and/or TF: No      /71   Pulse 101   Temp 98.3 °F (36.8 °C) (Oral)   Resp 18   Ht 5' 9" (1.753 m)   Wt 120.2 kg (264 lb 15.9 oz)   SpO2 97%   BMI 39.13 kg/m²     Labs Reviewed and Include    Recent Labs   Lab 24  0518   *   CALCIUM 8.6*   ALBUMIN 1.8*   PROT 7.0      K 4.7   CO2 26      BUN 31*   CREATININE 1.7*   ALKPHOS 68   ALT 8*   AST 20   BILITOT 0.3     Lab Results   Component Value Date    WBC 8.44 2024    HGB 7.6 (L) 2024    HCT 24.9 (L) 2024    MCV 80 (L) 2024     (H) 2024     No results for input(s): "TSH", "FREET4" in the last 168 hours.  Lab Results   Component Value Date    HGBA1C >14.0 (H) 2024       Nutritional status:   Body mass index is 39.13 kg/m².  Lab Results   Component Value Date    ALBUMIN 1.8 (L) 2024    ALBUMIN 1.9 (L) 2024    ALBUMIN 1.7 (L) 2024     Lab Results   Component Value Date    PREALBUMIN 17 (L) 2014    PREALBUMIN 16 (L) 2014    PREALBUMIN 19 (L) 2014       Estimated Creatinine Clearance: 48.1 mL/min (A) (based on SCr of 1.7 mg/dL (H)).    Accu-Checks  Recent Labs     24  1714 24  1733 24  1746 24  2050 24  0226 24  0806 24  0843 24  1218 24  1703 24   POCTGLUCOSE 41* 43* 85 88 156* 220* 216* 173* 180* 149*       Current Medications and/or Treatments Impacting Glycemic Control  Immunotherapy:  "   Immunosuppressants       None          Steroids:   Hormones (From admission, onward)      Start     Stop Route Frequency Ordered    03/05/24 1536  melatonin tablet 6 mg         -- Oral Nightly PRN 03/05/24 1437          Pressors:    Autonomic Drugs (From admission, onward)      None          Hyperglycemia/Diabetes Medications:   Antihyperglycemics (From admission, onward)      Start     Stop Route Frequency Ordered    03/19/24 1645  insulin aspart U-100 pen 4 Units         -- SubQ with dinner 03/19/24 0856    03/19/24 1130  insulin aspart U-100 pen 5 Units         -- SubQ with lunch 03/18/24 1910    03/19/24 0900  insulin detemir U-100 (Levemir) pen 12 Units         -- SubQ 2 times daily 03/18/24 2143    03/19/24 0715  insulin aspart U-100 pen 5 Units         -- SubQ with breakfast 03/18/24 1910    03/11/24 1521  insulin aspart U-100 pen 0-5 Units         -- SubQ Before meals & nightly PRN 03/11/24 1520

## 2024-03-20 NOTE — ASSESSMENT & PLAN NOTE
Patient has swelling and pain in the right arm and some swelling in the left arm as well    -US b/l upper extremity for DVT showed clot, eliquis transitioned to Heparin

## 2024-03-20 NOTE — ASSESSMENT & PLAN NOTE
Patient developed new urinary retention.     -inpatient consult to urology  -Sams's placed   -Flomax  -Renal ultrasound

## 2024-03-20 NOTE — CONSULTS
[facility] - Telemetry Stepdown  Infectious Disease  Consult Note    Patient Name: Saji Castañeda  MRN: [id number]  Admission Date: 3/5/2024  Hospital Length of Stay: 15 days  Attending Physician: Jm Rosa MD  Primary Care Provider: [facility] -     Isolation Status: Contact    Patient information was obtained from patient and ER records.      Inpatient consult to Infectious Diseases  Consult performed by: Patel Stein Jr., PA  Consult ordered by: Lynette Perkins MD        Assessment/Plan:     ID  Cellulitis of left lower leg  75 y/o male with A-fib, uncontrolled DMII, HLD, HTN, chronic heel ulcerations c/b chronic osteomyelitis, chronic leg wounds admitted for DKA. ID consulted for infected chronic wounds/ulcerations and abx guidance as with hx of MDROs.     Podiatry obtained left heel wound cultures (probes to bone). Cx + Proteus mirabilis, E. Cloaecae and now Fusarium and C. Albicans. Patient has been on IV Ertapenem with plans for six weeks of treatment at and voriconazole added with plans to treat x 6 months.     Now with isolated fever to 102.3 on 3/19 but has been asymptomatic.  No leukocytosis.  Infxn work up negative so far.  ID reconsulted due to fever. Patient now amenable to LLE AKA - on 3/27.  RUE edematous and painful secondary to brachial DVT - on anticoagulation.  Given patient with isolated fever, asymptomatic, no leukocytosis and negative infection work up - suspect secondary to known RUE DVT/phlebitis.  Stable without signs of systemic infection.      Recommendations  Continue IV Ertapenem x 6 weeks for acute on chronic osteomyelitis   Continue Voriconazole 200 mg PO BID   Agree with amputation LLE for source control  Elevated RUE above heart  Monitor for fever recurrence -notify ID if recurs  Discussed with ID staff.   ID will sign off - reconsult post AKA for further recs.                        Thank you for your consult. I will sign off. Please contact us if you have any  additional questions.    JOHNNY Goel  Infectious Disease  [facility] - Telemetry Stepdown    Subjective:     Principal Problem: Osteomyelitis of left lower extremity    HPI: 75 y/o male with a.fib, DMII uncontrolled, HLD, HTN, chronic heel ulcerations c/b chronic osteomyelitis, hx of  Acinetobacter and ESBL klebsiella admitted for DKA. ID consulted for chronic wounds/ulcerations and abx guidance as with hx of MDROs.  ID followed and rec 6 weeks of IV Ertapenem and 6 months of Voriconazole for LLE cultures showing Proteus M/E cloacae and Fusarium/C Albicans respectively on cultures.    3/19 spike fever 102.3.  Blood cultures sent and are NGTD.  Developed urinary retention and fung placed - UA negative.  CXR done showing: lung zones stable, no significant new areas of consolidation or volume loss.  US UEs 3/14 showed RUE brachial DVT and is on heparin and clopidogrel.  ID consulted for fevers despite abx/antifungals.  Patient seen and denies symptoms when had fever.  LLE pain stable.  Patient had declined vasc sx rec of AKA/BKA but now is amenable and is scheduled for 3/27.  The patient denies any recent fever, chills, or sweats.      Past Medical History:   Diagnosis Date    Arthritis     legs    Bacteremia due to Gram-negative bacteria 3/23/2021    Diabetes mellitus     Diabetes mellitus, type 2     Hyperlipidemia     Hypertension     Osteomyelitis     Palliative care encounter 5/24/2023       Past Surgical History:   Procedure Laterality Date    ANGIOGRAPHY OF LOWER EXTREMITY N/A 2/3/2021    Procedure: Angiogram Extremity Bilateral;  Surgeon: Ernst Chacko MD;  Location: [facility] OR [address] [place] [address];  Service: Peripheral Vascular;  Laterality: N/A;  7.4 mintues fluoroscopy time  816.15 mGy  170.17 Gycm2    AORTOGRAPHY WITH EXTREMITY RUNOFF Bilateral 2/3/2021    Procedure: AORTOGRAM, WITH EXTREMITY RUNOFF;  Surgeon: Ernst Chacko MD;  Location: [facility] OR [address] [place] [address];  Service: Peripheral Vascular;   "Laterality: Bilateral;    DEBRIDEMENT OF FOOT Left 2/23/2021    Procedure: DEBRIDEMENT, LEFT HEEL;  Surgeon: Mayra Schroeder DPM;  [place] [facility] [place] [address] [place] [address];  Service: Podiatry;  Laterality: Left;    FOOT AMPUTATION  October 2010    left high midfoot amputation    IMPLANTATION OF LEADLESS PACEMAKER N/A 10/12/2023    Procedure: [id number];  Surgeon: VANESSA Delatorre MD;  [place] [facility];  Service: Cardiology;  Laterality: N/A;  AVB, MICRA, EH, ANES, [place] [id number]       Review of patient's allergies indicates:  No Known Allergies    Medications:  Medications Prior to Admission   Medication Sig    acetaminophen (TYLENOL) 325 MG tablet Take 650 mg by mouth every 6 (six) hours as needed for Temperature greater than.    acidophilus-pectin, citrus 100 million cell-10 mg Cap Take 1 capsule by mouth 2 (two) times a day.    ascorbic acid, vitamin C, (VITAMIN C) 500 MG tablet Take 500 mg by mouth 2 (two) times daily.    B COMPLEX-VITAMIN B12 tablet Take 1 tablet by mouth once daily.    BD AUTOSHIELD DUO PEN NEEDLE 30 gauge x 3/16" Ndle     BD ULTRA-FINE ADITYA PEN NEEDLE 32 gauge x 5/32" Ndle USE FOUR TIMES DAILY WITH MEALS AND NIGHTLY    blood sugar diagnostic (CONTOUR TEST STRIPS) Strp Inject 1 strip into the skin 2 (two) times daily before meals.    clopidogreL (PLAVIX) 75 mg tablet Take 1 tablet (75 mg total) by mouth once daily.    gabapentin (NEURONTIN) 300 MG capsule Take 300 mg by mouth 2 (two) times daily.    insulin aspart U-100 (NOVOLOG) 100 unit/mL (3 mL) InPn pen Inject 6 Units into the skin 3 (three) times daily with meals.    MICROLET LANCET Misc     multivitamin (THERAGRAN) per tablet Take 1 tablet by mouth once daily.    NIFEdipine (PROCARDIA-XL) 60 MG (OSM) 24 hr tablet Take 1 tablet (60 mg total) by mouth once daily.    pantoprazole (PROTONIX) 40 MG tablet Take 40 mg by mouth once daily. Before breakfast    pen needle, diabetic 32 gauge x 5/32" Ndle use as directed with insulin    " rosuvastatin (CRESTOR) 40 MG Tab Take 40 mg by mouth once daily.    tamsulosin (FLOMAX) 0.4 mg Cap Take 0.4 mg by mouth once daily.    triamcinolone acetonide 0.025% (KENALOG) 0.025 % Oint Apply topically 2 (two) times daily as needed (to affected area).    [DISCONTINUED] apixaban (ELIQUIS) 5 mg Tab Take 1 tablet (5 mg total) by mouth 2 (two) times daily.    [DISCONTINUED] diphenhydrAMINE (BENADRYL) 25 mg capsule No directions listed on the patients medication list.    [DISCONTINUED] furosemide (LASIX) 40 MG tablet Take 20 mg by mouth.    [DISCONTINUED] glipiZIDE (GLUCOTROL) 10 MG tablet Take 20 mg by mouth 2 (two) times a day.    [DISCONTINUED] hydrALAZINE (APRESOLINE) 100 MG tablet Take 1 tablet (100 mg total) by mouth every 8 (eight) hours.    [DISCONTINUED] isosorbide dinitrate (ISORDIL) 10 MG tablet Take 1 tablet (10 mg total) by mouth 3 (three) times daily.    [DISCONTINUED] LANTUS SOLOSTAR U-100 INSULIN glargine 100 units/mL SubQ pen Inject 45 Units into the skin every evening. (Patient taking differently: Inject 45 Units into the skin once daily.)    [DISCONTINUED] miconazole NITRATE 2 % (MICOTIN) 2 % top powder Apply topically 2 (two) times daily.    [DISCONTINUED] oxyCODONE 5 mg TbOr Take 1 tablet by mouth every 6 (six) hours as needed (Pain (Max 5 daily)).    [DISCONTINUED] sodium hypochlorite 0.5 % (DAKIN'S SOLUTION) external solution Apply topically once daily.     Antibiotics (From admission, onward)      Start     Stop Route Frequency Ordered    03/08/24 1230  ertapenem (INVANZ) 1 g in sodium chloride 0.9 % 100 mL IVPB (MB+)         -- IV Every 24 hours (non-standard times) 03/08/24 1115          Antifungals (From admission, onward)      Start     Stop Route Frequency Ordered    03/15/24 1045  voriconazole tablet 200 mg         -- Oral 2 times daily 03/15/24 0936          Antivirals (From admission, onward)      None             Immunization History   Administered Date(s) Administered    COVID-19,  MRNA, LN-S, PF (MODERNA FULL 0.5 ML DOSE) 05/24/2021, 07/13/2021    Influenza - Trivalent (ADULT) 10/08/2020    PPD Test 12/09/2020, 03/02/2021, 06/21/2022, 03/18/2024    Pneumococcal Conjugate - 13 Valent 10/16/2018       Family History       Problem Relation (Age of Onset)    Cancer Brother    Diabetes Mother, Sister    Heart disease Mother    Stroke Sister          Social History     Socioeconomic History    Marital status: Single   Tobacco Use    Smoking status: Never    Smokeless tobacco: Never   Substance and Sexual Activity    Alcohol use: No     Comment: occassional    Drug use: No    Sexual activity: Not Currently   Social History Narrative    Not currently working; lives with family     Social Determinants of Health     Financial Resource Strain: Medium Risk (3/5/2024)    Overall Financial Resource Strain (CARDIA)     Difficulty of Paying Living Expenses: Somewhat hard   Food Insecurity: No Food Insecurity (3/5/2024)    Hunger Vital Sign     Worried About Running Out of Food in the Last Year: Never true     Ran Out of Food in the Last Year: Never true   Transportation Needs: No Transportation Needs (3/5/2024)    PRAPARE - Transportation     Lack of Transportation (Medical): No     Lack of Transportation (Non-Medical): No   Physical Activity: Inactive (3/5/2024)    Exercise Vital Sign     Days of Exercise per Week: 0 days     Minutes of Exercise per Session: 0 min   Stress: No Stress Concern Present (3/5/2024)    [employer] - Occupational Stress Questionnaire     Feeling of Stress : Only a little   Social Connections: Socially Isolated (3/5/2024)    Social Connection and Isolation Panel [NHANES]     Frequency of Communication with Friends and Family: More than three times a week     Frequency of Social Gatherings with Friends and Family: Patient declined     Attends Anabaptism Services: Never     Active Member of Clubs or Organizations: No     Attends Club or Organization  Meetings: Never     Marital Status: Never    Housing Stability: Low Risk  (3/5/2024)    Housing Stability Vital Sign     Unable to Pay for Housing in the Last Year: No     Number of Places Lived in the Last Year: 1     Unstable Housing in the Last Year: No     Review of Systems   Constitutional:  Negative for chills, diaphoresis and fever.   Respiratory:  Negative for shortness of breath.    Cardiovascular:  Negative for chest pain.   Gastrointestinal:  Negative for abdominal pain, diarrhea, nausea and vomiting.   Genitourinary:  Negative for dysuria and hematuria.   Musculoskeletal:  Positive for myalgias.   Skin:  Positive for color change and wound.     Objective:     Vital Signs (Most Recent):  Temp: 98.2 °F (36.8 °C) (03/20/24 1128)  Pulse: 81 (03/20/24 1128)  Resp: (!) 22 (03/20/24 1128)  BP: (!) 153/59 (03/20/24 1128)  SpO2: 100 % (03/20/24 1128) Vital Signs (24h Range):  Temp:  [97.4 °F (36.3 °C)-98.3 °F (36.8 °C)] 98.2 °F (36.8 °C)  Pulse:  [] 81  Resp:  [17-22] 22  SpO2:  [97 %-100 %] 100 %  BP: (123-153)/(57-71) 153/59     Weight: 120.2 kg (264 lb 15.9 oz)  Body mass index is 39.13 kg/m².    Estimated Creatinine Clearance: 48.1 mL/min (A) (based on SCr of 1.7 mg/dL (H)).     Physical Exam  Constitutional:       General: He is not in acute distress.     Appearance: He is well-developed. He is ill-appearing. He is not toxic-appearing or diaphoretic.       HENT:      Head: Normocephalic and atraumatic.   Cardiovascular:      Rate and Rhythm: Normal rate and regular rhythm.      Heart sounds: Normal heart sounds. No murmur heard.     No friction rub. No gallop.   Pulmonary:      Effort: Pulmonary effort is normal. No respiratory distress.      Breath sounds: Normal breath sounds. No wheezing or rales.   Abdominal:      General: Bowel sounds are normal. There is no distension.      Palpations: Abdomen is soft. There is no mass.      Tenderness: There is no abdominal tenderness. There is no  guarding or rebound.   Skin:     General: Skin is warm and dry.   Neurological:      Mental Status: He is alert and oriented to person, place, and time.   Psychiatric:         Behavior: Behavior normal.        Significant Labs: Blood Culture:   Recent Labs   Lab 10/02/23  1701 10/06/23  1131 10/06/23  1133 03/05/24  0800 03/19/24  1214   LABBLOO Gram stain aer bottle: Gram negative rods  Results called to and read back by:Alejandra Figueroa RN 10/03/2023  06:29  Gram stain jumana bottle:Gram positive cocci in clusters resembling Staph  Results called to and read back by:Mauro Prado Rn 10/07/2023  00:39  Reviewed by PhD Supervisor, Supervisor, or designee 10/04/2023  13:53  ACINETOBACTER BAUMANNII   further identified as Acinetobacter baumannii nosocomialis group  For susceptibility see order #[id number]  *  COAGULASE-NEGATIVE STAPHYLOCOCCUS SPECIES  Organism is a probable contaminant  *  Gram stain aer bottle: Gram negative rods  Gram stain jumana bottle: Gram negative rods  Results called to and read back by:Alejandra Figueroa RN 10/03/2023  06:29  Gram stain jumana bottle: Gram positive cocci in clusters resembling Staph  Results called to and read back by: Ricky Munguia RN 10/05/2023  06:53  KLEBSIELLA PNEUMONIAE ESBL*  ACINETOBACTER BAUMANNII   further identified as Acinetobacter baumannii nosocomialis group  * No growth after 5 days. No growth after 5 days. No growth after 5 days.  No growth after 5 days. No Growth to date  No Growth to date  No Growth to date  No Growth to date     CBC:   Recent Labs   Lab 03/19/24  0752 03/20/24  0518 03/20/24  0833   WBC 11.67 8.44 8.60   HGB 8.2* 7.6* 7.6*   HCT 27.3* 24.9* 25.1*   * 561* 556*     CMP:   Recent Labs   Lab 03/19/24  0752 03/20/24  0518    141   K 4.9 4.7    105   CO2 28 26   * 155*   BUN 28* 31*   CREATININE 1.8* 1.7*   CALCIUM 8.5* 8.6*   PROT  --  7.0   ALBUMIN  --  1.8*   BILITOT  --  0.3   ALKPHOS  --  68    AST  --  20   ALT  --  8*   ANIONGAP 8 10     Wound Culture:   Recent Labs   Lab 10/03/23  1216 03/06/24  1439   LABAERO FERNANDO ALBICANS  Few  *  ACHROMOBACTER XYLOSOXIDANS SUBSP. DENTRIFICANS  Rare  Skin bossman also present  * PROTEUS MIRABILIS  Few  *  ENTEROBACTER CLOACAE  Few  Skin bossman also present  *     All pertinent labs within the past 24 hours have been reviewed.    Significant Imaging: I have reviewed all pertinent imaging results/findings within the past 24 hours.  US Retroperitoneal Complete [[id number]] Resulted: 03/20/24 1541   Order Status: Completed Updated: 03/20/24 1544   Narrative:     EXAMINATION:  US RETROPERITONEAL COMPLETE    CLINICAL HISTORY:  YNES with urinary retention s/p fung's;    TECHNIQUE:  Ultrasound of the kidneys and urinary bladder was performed including color flow and Doppler evaluation of the kidneys.    COMPARISON:  Retroperitoneal ultrasound 10/04/2023    FINDINGS:  Right kidney: The right kidney measures 12.0 cm. No cortical thinning. No loss of corticomedullary distinction. Resistive index measures 1.0.  No mass. No renal stone. No hydronephrosis.    Left kidney: The left kidney measures 12.4 cm. No cortical thinning. No loss of corticomedullary distinction. Resistive index measures 1.0.  2.1 cm simple cyst in the upper pole.  No solid mass.  No renal stone. No hydronephrosis.    Bladder is decompressed around a Fung catheter.    Spleen resistive index measures 0.84.   Impression:       Elevated arterial resistive indices in both kidneys, which can be seen with medical renal disease.    No hydronephrosis.      Electronically signed by: Rishi Ramos  Date: 03/20/2024  Time: 15:41   X-Ray Chest AP Portable [[id number]] Resulted: 03/19/24 1339   Order Status: Completed Updated: 03/19/24 1342   Narrative:     EXAMINATION:  XR CHEST AP PORTABLE    CLINICAL HISTORY:  ruleout pneumonia;    TECHNIQUE:  One view    COMPARISON:  Comparison is made to  03/05/2024.    FINDINGS:  Heart size and the appearance of the cardiomediastinal silhouette are unchanged since the examination referenced above.  Once again there is a relatively poor inspiratory depth level, but the lung zones appear stable, with no significant new areas of airspace consolidation or volume loss evident.  No pleural fluid of any substantial volume is seen on either side.  No pneumothorax.  A loop recorder is superimposed over the medial aspect of the lower hemithorax on the left.   Impression:       No significant detrimental interval change in the appearance of the chest since 03/05/2024 is appreciated.      Electronically signed by: Jaylan English MD  Date: 03/19/2024  Time: 13:39   US Upper Extremity Veins Left [[id number]] (Abnormal) Resulted: 03/14/24 1156   Order Status: Completed Updated: 03/14/24 1158   Narrative:     EXAMINATION:  US UPPER EXTREMITY VEINS RIGHT; US UPPER EXTREMITY VEINS LEFT    CLINICAL HISTORY:  r/o dvt;; arm swollen, painful;    TECHNIQUE:  Duplex and color flow Doppler evaluation and dynamic compression was performed of the right and left upper extremity veins.    COMPARISON:  None    FINDINGS:  Right central veins: The internal jugular, subclavian, and axillary veins are patent and free of thrombus.    Right arm veins: Thrombosis of the brachial vein surrounding venous line.  The basilic and cephalic veins are patent and compressible.    Left central veins: The internal jugular, subclavian, and axillary veins are patent and free of thrombus.    Left arm veins: The brachial, basilic, and cephalic veins are patent and compressible.  Line noted within the brachial vein.       Impression:       Deep venous thrombosis of the right brachial vein surrounding venous line.    This report was flagged in Epic as abnormal.    Finding was relayed to Dr. Collins on 03/14/2024 at 11:46.      Electronically signed by: Edwin Aparicio  Date: 03/14/2024  Time: 11:56   US Upper Extremity Veins  Right [[id number]] (Abnormal) Resulted: 03/14/24 1156   Order Status: Completed Updated: 03/14/24 1158   Narrative:     EXAMINATION:  US UPPER EXTREMITY VEINS RIGHT; US UPPER EXTREMITY VEINS LEFT    CLINICAL HISTORY:  r/o dvt;; arm swollen, painful;    TECHNIQUE:  Duplex and color flow Doppler evaluation and dynamic compression was performed of the right and left upper extremity veins.    COMPARISON:  None    FINDINGS:  Right central veins: The internal jugular, subclavian, and axillary veins are patent and free of thrombus.    Right arm veins: Thrombosis of the brachial vein surrounding venous line.  The basilic and cephalic veins are patent and compressible.    Left central veins: The internal jugular, subclavian, and axillary veins are patent and free of thrombus.    Left arm veins: The brachial, basilic, and cephalic veins are patent and compressible.  Line noted within the brachial vein.       Impression:       Deep venous thrombosis of the right brachial vein surrounding venous line.    This report was flagged in Epic as abnormal.    Finding was relayed to Dr. Collins on 03/14/2024 at 11:46.      Electronically signed by: Edwin Aparicio  Date: 03/14/2024  Time: 11:56     Imaging History    2024      Date Procedure Name Study Review Link PACS Link Status Accession Number Location   03/20/24 03:37 PM US Retroperitoneal Complete Study Review  Images Final [id number] [facility]   03/19/24 01:08 PM X-Ray Chest AP Portable Study Review  Images Final [id number] [facility]   03/14/24 11:41 AM US Upper Extremity Veins Left Study Review  Images Final [id number] [facility]   03/14/24 11:40 AM US Upper Extremity Veins Right Study Review  Images Final [id number] [facility]   03/05/24 09:45 AM X-Ray Chest AP Portable Study Review  Images Final [id number] [facility]   03/20/24 01:22 AM CARDIAC MONITORING STRIPS Study Review  Final     03/19/24 04:33 PM CARDIAC MONITORING STRIPS Study Review  Final     03/19/24 04:33 PM CARDIAC MONITORING STRIPS Study Review   Final     03/19/24 04:33 PM CARDIAC MONITORING STRIPS Study Review  Final     03/19/24 04:33 PM CARDIAC MONITORING STRIPS Study Review  Final     03/19/24 04:33 PM CARDIAC MONITORING STRIPS Study Review  Final     03/19/24 04:33 PM CARDIAC MONITORING STRIPS Study Review  Final     03/19/24 04:33 PM CARDIAC MONITORING STRIPS Study Review  Final     03/19/24 04:33 PM CARDIAC MONITORING STRIPS Study Review  Final     03/19/24 04:33 PM CARDIAC MONITORING STRIPS Study Review  Final     03/19/24 04:33 PM CARDIAC MONITORING STRIPS Study Review  Final     03/19/24 04:33 PM CARDIAC MONITORING STRIPS Study Review  Final     03/19/24 04:33 PM CARDIAC MONITORING STRIPS Study Review  Final     03/18/24 11:25 PM CARDIAC MONITORING STRIPS Study Review  Final     03/14/24 06:10 AM CARDIAC MONITORING STRIPS Study Review  Final     03/13/24 06:28 AM CARDIAC MONITORING STRIPS Study Review  Final     03/12/24 06:04 PM CARDIAC MONITORING STRIPS Study Review  Final     03/12/24 06:06 AM CARDIAC MONITORING STRIPS Study Review  Final     03/11/24 06:22 PM CARDIAC MONITORING STRIPS Study Review  Final     03/11/24 05:42 AM CARDIAC MONITORING STRIPS Study Review  Final

## 2024-03-20 NOTE — PROGRESS NOTES
Aaron Berg - Telemetry [facility] Medicine  Progress Note    Patient Name: Saji Castañeda  MRN: [id number]  Patient Class: IP- Inpatient   Admission Date: 3/5/2024  Length of Stay: 15 days  Attending Physician: Jm Rosa MD  Primary Care Provider: [facility] -        Subjective:     Principal Problem:Osteomyelitis of left lower extremity        HPI:  Saji Castañeda is a 74 y.o. male with a medical history of DM2, HLD, HTN, chronic osteomyelitis of L heel, L TMA, PAD, hx of ESBL klebsiella, acinetobacter bacteremia, presenting with hyperglycemia. He presented to the ED via EMS and his POCT glucose on arrival was >500. Serum blood glucose 667 with anion gap of 17 and elevated ketones. Serum potassium 3.4, corrected sodium 149. Urinalysis significant for RBCs, glucosuria, and moderate bacteria and yeast. CBC revealed leukocytosis, elevated platelets, and mild anemia. CMP shows Na 135, K 3.4, BUN 29, Cr 2.1, Glucose 677, Lactate 4.61. BNP and troponin elevated. GFR 32.2. Insulin drip, IV fluids, zosyn, vancomycin, and potassium given.      He is unable to provide much history, but states that he has not been taking his home medications for at least a week. He reports shortness of breath, fatigue, and weakness for the last 6-7 days. He has chills and reports episodes of emesis. He denies abdominal pain, chest pain, palpitations, recent illness/infection, fevers, changes to bowel movements and urinary output. Patient lives with his brother and sister at home. He was last seen in the ED on 2/21 after a fall. He was hypoglycemic BGL 60 at that time which returned to normal after eating. He was also scheduled for a wound clinic appointment today 3/5 at [facility] with Dr. Wilson.        Overview/Hospital Course:  Wound care and cultures collected by Podiatry with recs for Vascular consult for amputation. Patient declined amputation. ID recommending IV Ertapenem for chronic osteomyelitis for 6 week duration  (EED: 4/15/24). With high wound care needs and IV antibiotics plan patient needing long-term placement. He is being treated with topical permethrin for bedbugs. Wound care has been dressing the wounds. 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 Still adjusting insulin to control sugars better. Giving fluids for YNES. Poor PO intake even with feeding assistance. LTAC authorization denied by [employer]. Waiting for SNF placement ([facility], awaiting auth and family to complete paperwork). Patient started spiking fever again and infectious work up done. ID re-consulted. Patient has been counseled multiple times that source control cannot be achieved without amputation. Patient agreeable to AKA scheduled for 3/27. Transitioned to heparin from eliquis. Foely's placed for urinary rentention.       Interval History: Overnight patient had developed urinary retention and fung's was placed. Vascular surgery planning on AKA on 3/27 and eliquis switched to Lovenox.        Objective:     Vital Signs (Most Recent):  Temp: 98.2 °F (36.8 °C) (03/20/24 1128)  Pulse: 81 (03/20/24 1128)  Resp: (!) 22 (03/20/24 1128)  BP: (!) 153/59 (03/20/24 1128)  SpO2: 100 % (03/20/24 1128) Vital Signs (24h Range):  Temp:  [97.4 °F (36.3 °C)-99 °F (37.2 °C)] 98.2 °F (36.8 °C)  Pulse:  [] 81  Resp:  [17-22] 22  SpO2:  [97 %-100 %] 100 %  BP: (123-153)/(56-71) 153/59     Weight: 120.2 kg (264 lb 15.9 oz)  Body mass index is 39.13 kg/m².    Intake/Output Summary (Last 24 hours) at 3/20/2024 1154  Last data filed at 3/20/2024 1048  Gross per 24 hour   Intake 50 ml   Output 1650 ml   Net -1600 ml         Physical Exam      Constitutional:       General: He is not in acute distress.     Appearance: Normal appearance. He is well-developed. He is ill-appearing . He is not diaphoretic but lethargic  HENT:      Head: Normocephalic and atraumatic.      Right Ear: External ear normal.      Left Ear:  External ear normal.      Nose: Nose normal.   Eyes:      General: No scleral icterus.        Right eye: No discharge.         Left eye: No discharge.      Conjunctiva/sclera: Conjunctivae normal.   Pulmonary:      Effort: Pulmonary effort is normal. No respiratory distress.      Breath sounds: No stridor.   Skin:     General: Skin is dry.      Comments: Bruising, lesion and rash present.      LE swelling B/L with thick discolored skin. Wound on plantar L footWounds of legs, back, buttocks   Neurological:      General: No focal deficit present.      Mental Status: He is alert and oriented to person, place, and time. Mental status is at baseline.   Psychiatric:         Mood and Affect: Mood normal.         Behavior: Behavior normal.         Thought Content: Thought content normal.         Judgment: Judgment normal.   Significant Labs: All pertinent labs within the past 24 hours have been reviewed.    Significant Imaging: I have reviewed all pertinent imaging results/findings within the past 24 hours.    Assessment/Plan:      * Osteomyelitis of left lower extremity  Patient with Proteus and enterobacter on leg cultures collected by Podiatry with concern for acute on chronic osteo. Fungal culture grew fusarium. He has started spiking fever and still is not agreeable to amputation, will do infectious work up to look out for source.    -UA reflex to culture  -CXR   -Blood culture  -Inpatient consult to ID and vascular surgery who plans to do AKA on 3/27.  - IV ertapenem with end date 4/15/24   - Added voriconazole for fusarium coverage which grew on fungal culture - 3 months  - midline catheters placed on admission for difficult IV access  -Still thinking about the amputation recommended by vascular surgery - has been given enough time at this point and demonstrates poor understanding, conversations about amputation have been unproductive for the past week     Urinary retention  Patient developed new urinary retention.      -inpatient consult to urology  -Sams's placed   -Flomax  -Renal ultrasound      Localized swelling of right upper extremity  Patient has swelling and pain in the right arm and some swelling in the left arm as well    -US b/l upper extremity for DVT showed clot, eliquis transitioned to Heparin    History of Bedbug bite with infection  Patient started on Permethrin daily for 5 days starting 3/12      Proteus infection    On ertapenem     Chronic anticoagulation  - See DVT      History of amputation of left high mid foot   See osteo      Stage 3b chronic kidney disease  Creatine stable for now. BMP reviewed- noted Estimated Creatinine Clearance: 48.1 mL/min (A) (based on SCr of 1.7 mg/dL (H)). according to latest data. Based on current GFR, CKD stage is stage 3 - GFR 30-59.  Monitor UOP and serial BMP and adjust therapy as needed. Renally dose meds. Avoid nephrotoxic medications and procedures.    Severe sepsis  This patient does have evidence of infective focus  My overall impression is sepsis.  Source: Skin and Soft Tissue (location left leg)  Antibiotics given-   Antibiotics (72h ago, onward)      Start     Stop Route Frequency Ordered    03/08/24 1230  ertapenem (INVANZ) 1 g in sodium chloride 0.9 % 100 mL IVPB (MB+)         -- IV Every 24 hours (non-standard times) 03/08/24 1115          Latest lactate reviewed-  Recent Labs   Lab 03/19/24  1307   LACTATE 1.2       Organ dysfunction indicated by Acute kidney injury    Fluid challenge Actual Body weight- Patient will receive 30ml/kg actual body weight to calculate fluid bolus for treatment of septic shock.     Post- resuscitation assessment No - Post resuscitation assessment not needed       Will Not start Pressors-     - See osteo      Other hyperlipidemia  Continue home atorvastatin      Diabetic ketoacidosis without coma associated with type 2 diabetes mellitus  Patient's FSGs are uncontrolled due to hyperglycemia on current medication regimen.  Last A1c  reviewed-   Lab Results   Component Value Date    HGBA1C >14.0 (H) 03/05/2024     Most recent fingerstick glucose reviewed-   Recent Labs   Lab 03/19/24  1218 03/19/24  1703 03/19/24  2053 03/20/24  0810   POCTGLUCOSE 173* 180* 149* 195*       Current correctional scale   LD SSI  Maintain anti-hyperglycemic dose as follows-   Antihyperglycemics (From admission, onward)      Start     Stop Route Frequency Ordered    03/20/24 1645  insulin aspart U-100 pen 2-4 Units         -- SubQ with dinner 03/20/24 0644    03/20/24 1130  insulin aspart U-100 pen 3-5 Units         -- SubQ with lunch 03/20/24 0644    03/20/24 0715  insulin aspart U-100 pen 3-5 Units         -- SubQ with breakfast 03/20/24 0644    03/19/24 0900  insulin detemir U-100 (Levemir) pen 12 Units         -- SubQ 2 times daily 03/18/24 2143    03/11/24 1521  insulin aspart U-100 pen 0-5 Units         -- SubQ Before meals & nightly PRN 03/11/24 1520          Hold Oral hypoglycemics while patient is in the hospital.  Will keep patient on DKA pathway with insulin drip and fluid with protocol in place for switching to subcutaneous insulin as anion gap closes.      DKA  HHS  - Resolved  - Continue insulin regimen and BG checks TIDWM and QHS and 2am  - patient tends to become hypoglycemic around dinnertime  - could be insulin stacking from breakfast and lunch insulin aspart given poor PO intake despite assistance with feeding   - detemir 12 unit BID  - aspart scheduled w/ only breakfast and lunch and dinner with different units  -Inpatient consult to Endocrine    Paroxysmal atrial fibrillation  Patient with Persistent (7 days or more) atrial fibrillation which is uncontrolled currently with    . Patient is currently in sinus rhythm.[id number] Score: 4. . Anticoagulation indicated. Anticoagulation done with lovenox as we could not get IV access for heparin  .    -Eliquis transitioned to Heparin in preparation for AKA.    Chronic deep vein thrombosis (DVT) of right  upper extremity  - eliquis transitioned to Heparin      Cellulitis of left lower leg  See osteo      Peripheral arterial disease  Resume plavix      YNES (acute kidney injury)  Patient with acute kidney injury/acute renal failure likely due to pre-renal azotemia due to dehydration YNES is currently worsening. Baseline creatinine  1.1  - Labs reviewed- Renal function/electrolytes with Estimated Creatinine Clearance: 48.1 mL/min (A) (based on SCr of 1.7 mg/dL (H)). according to latest data. Monitor urine output and serial BMP and adjust therapy as needed. Avoid nephrotoxins and renally dose meds for GFR listed above.    Creatinine 2.1 on admit, baseline around 1.1  - Likely pre-renal from poor PO intake    Lab Results   Component Value Date    CREATININE 1.7 (H) 03/20/2024       Plan:   - giving 1 liter of LR  - Check urine lytes urine protein creatinine ratio if YNES does not improve w/ fluids  - Strict I&Os and daily weights   - Daily BMP  - Trend sCr  - Avoid nephrotoxic agents such as NSAIDs, ACEi, ARBs and IV radiocontrast.  - Renally dose meds to current GFR.  - Maintain MAP > 65.  - No indications for HD at this time.   - Maintain electrolytes at goal: Mg > 2, Phos > 3, K > 4.  -US retroperitoneal          Insulin dependent type 2 diabetes mellitus  Patient's FSGs are uncontrolled due to hyperglycemia on current medication regimen.  Last A1c reviewed-   Lab Results   Component Value Date    HGBA1C >14.0 (H) 03/05/2024     Most recent fingerstick glucose reviewed-   Recent Labs   Lab 03/19/24  1218 03/19/24  1703 03/19/24  2053 03/20/24  0810   POCTGLUCOSE 173* 180* 149* 195*     Current correctional scale  Low  Increase anti-hyperglycemic dose as follows-   Antihyperglycemics (From admission, onward)      Start     Stop Route Frequency Ordered    03/20/24 1645  insulin aspart U-100 pen 2-4 Units         -- SubQ with dinner 03/20/24 0644    03/20/24 1130  insulin aspart U-100 pen 3-5 Units         -- SubQ with  lunch 03/20/24 0644    03/20/24 0715  insulin aspart U-100 pen 3-5 Units         -- SubQ with breakfast 03/20/24 0644    03/19/24 0900  insulin detemir U-100 (Levemir) pen 12 Units         -- SubQ 2 times daily 03/18/24 2143    03/11/24 1521  insulin aspart U-100 pen 0-5 Units         -- SubQ Before meals & nightly PRN 03/11/24 1520          Hold Oral hypoglycemics while patient is in the hospital.    Essential hypertension  Chronic, uncontrolled. Latest blood pressure and vitals reviewed-     Temp:  [97.4 °F (36.3 °C)-99 °F (37.2 °C)]   Pulse:  []   Resp:  [17-22]   BP: (123-153)/(56-71)   SpO2:  [97 %-100 %] .   Home meds for hypertension were reviewed and noted below.   Hypertension Medications               furosemide (LASIX) 40 MG tablet Take 20 mg by mouth.    hydrALAZINE (APRESOLINE) 100 MG tablet Take 1 tablet (100 mg total) by mouth every 8 (eight) hours.    isosorbide dinitrate (ISORDIL) 10 MG tablet Take 1 tablet (10 mg total) by mouth 3 (three) times daily.    NIFEdipine (PROCARDIA-XL) 60 MG (OSM) 24 hr tablet Take 1 tablet (60 mg total) by mouth once daily.            While in the hospital, will manage blood pressure as follows; Adjust home antihypertensive regimen as follows- will keep nifedipine - increased to 90     Will utilize p.r.n. blood pressure medication only if patient's blood pressure greater than 180/110 and he develops symptoms such as worsening chest pain or shortness of breath.      VTE Risk Mitigation (From admission, onward)           Ordered     heparin 25,000 units in dextrose 5% (100 units/ml) IV bolus from bag HIGH INTENSITY nomogram - [facility]  As needed (PRN)        Question:  Heparin Infusion Adjustment (DO NOT MODIFY ANSWER)  Answer:  [email or web address]\Images\Pharmacy\HeparinInfusions\heparin HIGH INTENSITY nomogram for [facility] [id number]    03/20/24 0744     heparin 25,000 units in dextrose 5% (100 units/ml) IV bolus from bag HIGH INTENSITY nomogram - [facility]  As needed (PRN)         Question:  Heparin Infusion Adjustment (DO NOT MODIFY ANSWER)  Answer:  [email or web address]\Images\Pharmacy\HeparinInfusions\heparin HIGH INTENSITY nomogram for [facility] [id number].pdf    03/20/24 0744     heparin 25,000 units in dextrose 5% 250 mL (100 units/mL) infusion HIGH INTENSITY nomogram - [facility]  Continuous        Question:  Begin at (units/kg/hr)  Answer:  18 03/20/24 0747                    Discharge Planning   OXANA: 3/29/2024     Code Status: Full Code   Is the patient medically ready for discharge?: No    Reason for patient still in hospital (select all that apply): Treatment  Discharge Plan A: Skilled Nursing Facility   Discharge Delays: None known at this time              Lynette Perkins MD  Department of Hospital Medicine   [facility] - Telemetry Stepdown

## 2024-03-20 NOTE — ASSESSMENT & PLAN NOTE
75 y/o male with A-fib, uncontrolled DMII, HLD, HTN, chronic heel ulcerations c/b chronic osteomyelitis, chronic leg wounds admitted for DKA. ID consulted for infected chronic wounds/ulcerations and abx guidance as with hx of MDROs.     Podiatry obtained left heel wound cultures (probes to bone). Cx + Proteus mirabilis, E. Cloaecae and now Fusarium and C. Albicans. Patient has been on IV Ertapenem with plans for six weeks of treatment at and voriconazole added with plans to treat x 6 months.     Now with isolated fever to 102.3 on 3/19 but has been asymptomatic.  No leukocytosis.  Infxn work up negative so far.  ID reconsulted due to fever. Patient now amenable to LLE AKA - on 3/27.  RUE edematous and painful secondary to brachial DVT - on anticoagulation.  Given patient with isolated fever, asymptomatic, no leukocytosis and negative infection work up - suspect secondary to known RUE DVT/phlebitis.  Stable without signs of systemic infection.      Recommendations  Continue IV Ertapenem x 6 weeks for acute on chronic osteomyelitis   Continue Voriconazole 200 mg PO BID   Agree with amputation LLE for source control  Monitor for fever recurrence -notify ID if recurs  Discussed with ID staff.   ID will sign off - reconsult post AKA for further recs.

## 2024-03-20 NOTE — ASSESSMENT & PLAN NOTE
This patient does have evidence of infective focus  My overall impression is sepsis.  Source: Skin and Soft Tissue (location left leg)  Antibiotics given-   Antibiotics (72h ago, onward)    Start     Stop Route Frequency Ordered    03/08/24 1230  ertapenem (INVANZ) 1 g in sodium chloride 0.9 % 100 mL IVPB (MB+)         -- IV Every 24 hours (non-standard times) 03/08/24 1115        Latest lactate reviewed-  Recent Labs   Lab 03/19/24  1307   LACTATE 1.2       Organ dysfunction indicated by Acute kidney injury    Fluid challenge Actual Body weight- Patient will receive 30ml/kg actual body weight to calculate fluid bolus for treatment of septic shock.     Post- resuscitation assessment No - Post resuscitation assessment not needed       Will Not start Pressors-     - See osteo

## 2024-03-21 LAB
ALBUMIN SERPL BCP-MCNC: 1.7 G/DL (ref 3.5–5.2)
ALP SERPL-CCNC: 67 U/L (ref 55–135)
ALT SERPL W/O P-5'-P-CCNC: 9 U/L (ref 10–44)
ANION GAP SERPL CALC-SCNC: 12 MMOL/L (ref 8–16)
APTT PPP: 49.9 SEC (ref 21–32)
APTT PPP: 54.5 SEC (ref 21–32)
APTT PPP: 87.4 SEC (ref 21–32)
AST SERPL-CCNC: 21 U/L (ref 10–40)
BASOPHILS # BLD AUTO: 0.05 K/UL (ref 0–0.2)
BASOPHILS NFR BLD: 0.5 % (ref 0–1.9)
BILIRUB SERPL-MCNC: 0.2 MG/DL (ref 0.1–1)
BUN SERPL-MCNC: 34 MG/DL (ref 8–23)
CALCIUM SERPL-MCNC: 8.3 MG/DL (ref 8.7–10.5)
CHLORIDE SERPL-SCNC: 104 MMOL/L (ref 95–110)
CO2 SERPL-SCNC: 22 MMOL/L (ref 23–29)
CREAT SERPL-MCNC: 1.6 MG/DL (ref 0.5–1.4)
DIFFERENTIAL METHOD BLD: ABNORMAL
EOSINOPHIL # BLD AUTO: 0.2 K/UL (ref 0–0.5)
EOSINOPHIL NFR BLD: 1.8 % (ref 0–8)
ERYTHROCYTE [DISTWIDTH] IN BLOOD BY AUTOMATED COUNT: 16.4 % (ref 11.5–14.5)
EST. GFR  (NO RACE VARIABLE): 44.7 ML/MIN/1.73 M^2
GLUCOSE SERPL-MCNC: 199 MG/DL (ref 70–110)
HCT VFR BLD AUTO: 24.3 % (ref 40–54)
HGB BLD-MCNC: 7.6 G/DL (ref 14–18)
IMM GRANULOCYTES # BLD AUTO: 0.03 K/UL (ref 0–0.04)
IMM GRANULOCYTES NFR BLD AUTO: 0.3 % (ref 0–0.5)
LYMPHOCYTES # BLD AUTO: 1.3 K/UL (ref 1–4.8)
LYMPHOCYTES NFR BLD: 14 % (ref 18–48)
MCH RBC QN AUTO: 24.3 PG (ref 27–31)
MCHC RBC AUTO-ENTMCNC: 31.3 G/DL (ref 32–36)
MCV RBC AUTO: 78 FL (ref 82–98)
MONOCYTES # BLD AUTO: 0.6 K/UL (ref 0.3–1)
MONOCYTES NFR BLD: 6 % (ref 4–15)
NEUTROPHILS # BLD AUTO: 7.1 K/UL (ref 1.8–7.7)
NEUTROPHILS NFR BLD: 77.4 % (ref 38–73)
NRBC BLD-RTO: 0 /100 WBC
PLATELET # BLD AUTO: 309 K/UL (ref 150–450)
PMV BLD AUTO: 9.3 FL (ref 9.2–12.9)
POCT GLUCOSE: 143 MG/DL (ref 70–110)
POCT GLUCOSE: 227 MG/DL (ref 70–110)
POCT GLUCOSE: 249 MG/DL (ref 70–110)
POCT GLUCOSE: 253 MG/DL (ref 70–110)
POCT GLUCOSE: 85 MG/DL (ref 70–110)
POTASSIUM SERPL-SCNC: 4.5 MMOL/L (ref 3.5–5.1)
PROT SERPL-MCNC: 6.8 G/DL (ref 6–8.4)
RBC # BLD AUTO: 3.13 M/UL (ref 4.6–6.2)
SODIUM SERPL-SCNC: 138 MMOL/L (ref 136–145)
WBC # BLD AUTO: 9.2 K/UL (ref 3.9–12.7)

## 2024-03-21 PROCEDURE — 20600001 HC STEP DOWN PRIVATE ROOM

## 2024-03-21 PROCEDURE — 99232 SBSQ HOSP IP/OBS MODERATE 35: CPT | Mod: ,,, | Performed by: NURSE PRACTITIONER

## 2024-03-21 PROCEDURE — 25000003 PHARM REV CODE 250

## 2024-03-21 PROCEDURE — 25000003 PHARM REV CODE 250: Performed by: PHYSICIAN ASSISTANT

## 2024-03-21 PROCEDURE — 85730 THROMBOPLASTIN TIME PARTIAL: CPT | Mod: 91 | Performed by: FAMILY MEDICINE

## 2024-03-21 PROCEDURE — 63600175 PHARM REV CODE 636 W HCPCS

## 2024-03-21 PROCEDURE — 25000003 PHARM REV CODE 250: Performed by: INTERNAL MEDICINE

## 2024-03-21 PROCEDURE — 63600175 PHARM REV CODE 636 W HCPCS: Performed by: PHYSICIAN ASSISTANT

## 2024-03-21 PROCEDURE — 85730 THROMBOPLASTIN TIME PARTIAL: CPT | Performed by: STUDENT IN AN ORGANIZED HEALTH CARE EDUCATION/TRAINING PROGRAM

## 2024-03-21 PROCEDURE — 36415 COLL VENOUS BLD VENIPUNCTURE: CPT | Mod: XB | Performed by: FAMILY MEDICINE

## 2024-03-21 PROCEDURE — A4216 STERILE WATER/SALINE, 10 ML: HCPCS

## 2024-03-21 PROCEDURE — 27000207 HC ISOLATION

## 2024-03-21 PROCEDURE — 80053 COMPREHEN METABOLIC PANEL: CPT

## 2024-03-21 PROCEDURE — 36415 COLL VENOUS BLD VENIPUNCTURE: CPT | Performed by: STUDENT IN AN ORGANIZED HEALTH CARE EDUCATION/TRAINING PROGRAM

## 2024-03-21 PROCEDURE — 85025 COMPLETE CBC W/AUTO DIFF WBC: CPT

## 2024-03-21 RX ADMIN — ERTAPENEM 1 G: 1 INJECTION INTRAMUSCULAR; INTRAVENOUS at 12:03

## 2024-03-21 RX ADMIN — COLLAGENASE SANTYL: 250 OINTMENT TOPICAL at 11:03

## 2024-03-21 RX ADMIN — SENNOSIDES 8.6 MG: 8.6 TABLET, FILM COATED ORAL at 10:03

## 2024-03-21 RX ADMIN — SODIUM CHLORIDE, POTASSIUM CHLORIDE, SODIUM LACTATE AND CALCIUM CHLORIDE 1000 ML: 600; 310; 30; 20 INJECTION, SOLUTION INTRAVENOUS at 05:03

## 2024-03-21 RX ADMIN — INSULIN ASPART 2 UNITS: 100 INJECTION, SOLUTION INTRAVENOUS; SUBCUTANEOUS at 12:03

## 2024-03-21 RX ADMIN — TAMSULOSIN HYDROCHLORIDE 0.4 MG: 0.4 CAPSULE ORAL at 10:03

## 2024-03-21 RX ADMIN — Medication 10 ML: at 05:03

## 2024-03-21 RX ADMIN — INSULIN ASPART 3 UNITS: 100 INJECTION, SOLUTION INTRAVENOUS; SUBCUTANEOUS at 10:03

## 2024-03-21 RX ADMIN — GABAPENTIN 300 MG: 300 CAPSULE ORAL at 10:03

## 2024-03-21 RX ADMIN — POLYETHYLENE GLYCOL 3350 17 G: 17 POWDER, FOR SOLUTION ORAL at 10:03

## 2024-03-21 RX ADMIN — INSULIN ASPART 5 UNITS: 100 INJECTION, SOLUTION INTRAVENOUS; SUBCUTANEOUS at 12:03

## 2024-03-21 RX ADMIN — PANTOPRAZOLE SODIUM 40 MG: 40 TABLET, DELAYED RELEASE ORAL at 10:03

## 2024-03-21 RX ADMIN — VORICONAZOLE 200 MG: 200 TABLET, FILM COATED ORAL at 08:03

## 2024-03-21 RX ADMIN — INSULIN ASPART 3 UNITS: 100 INJECTION, SOLUTION INTRAVENOUS; SUBCUTANEOUS at 04:03

## 2024-03-21 RX ADMIN — HEPARIN SODIUM AND DEXTROSE 17 UNITS/KG/HR: 10000; 5 INJECTION INTRAVENOUS at 12:03

## 2024-03-21 RX ADMIN — VORICONAZOLE 200 MG: 200 TABLET, FILM COATED ORAL at 10:03

## 2024-03-21 RX ADMIN — Medication 10 ML: at 11:03

## 2024-03-21 RX ADMIN — GABAPENTIN 300 MG: 300 CAPSULE ORAL at 08:03

## 2024-03-21 RX ADMIN — ACETAMINOPHEN 650 MG: 325 TABLET ORAL at 08:03

## 2024-03-21 RX ADMIN — NIFEDIPINE 90 MG: 30 TABLET, FILM COATED, EXTENDED RELEASE ORAL at 10:03

## 2024-03-21 RX ADMIN — ATORVASTATIN CALCIUM 40 MG: 40 TABLET, FILM COATED ORAL at 10:03

## 2024-03-21 RX ADMIN — CLOPIDOGREL BISULFATE 75 MG: 75 TABLET ORAL at 10:03

## 2024-03-21 NOTE — PLAN OF CARE
Problem: Adult Inpatient Plan of Care  Goal: Plan of Care Review  Outcome: Ongoing, Progressing  Goal: Patient-Specific Goal (Individualized)  Outcome: Ongoing, Progressing  Goal: Absence of Hospital-Acquired Illness or Injury  Outcome: Ongoing, Progressing  Goal: Optimal Comfort and Wellbeing  Outcome: Ongoing, Progressing  Goal: Readiness for Transition of Care  Outcome: Ongoing, Progressing     Problem: Diabetes Comorbidity  Goal: Blood Glucose Level Within Targeted Range  Outcome: Ongoing, Progressing     Problem: Adjustment to Illness (Sepsis/Septic Shock)  Goal: Optimal Coping  Outcome: Ongoing, Progressing     Problem: Glycemic Control Impaired (Sepsis/Septic Shock)  Goal: Blood Glucose Level Within Desired Range  Outcome: Ongoing, Progressing     Problem: Infection Progression (Sepsis/Septic Shock)  Goal: Absence of Infection Signs and Symptoms  Outcome: Ongoing, Progressing     Problem: Infection  Goal: Absence of Infection Signs and Symptoms  Outcome: Ongoing, Progressing     Problem: Impaired Wound Healing  Goal: Optimal Wound Healing  Outcome: Ongoing, Progressing     Problem: Skin Injury Risk Increased  Goal: Skin Health and Integrity  Outcome: Ongoing, Progressing     Problem: Fall Injury Risk  Goal: Absence of Fall and Fall-Related Injury  Outcome: Ongoing, Progressing     Problem: Fluid and Electrolyte Imbalance (Acute Kidney Injury/Impairment)  Goal: Fluid and Electrolyte Balance  Outcome: Ongoing, Progressing     Problem: Oral Intake Inadequate (Acute Kidney Injury/Impairment)  Goal: Optimal Nutrition Intake  Outcome: Ongoing, Progressing     Problem: Renal Function Impairment (Acute Kidney Injury/Impairment)  Goal: Effective Renal Function  Outcome: Ongoing, Progressing

## 2024-03-21 NOTE — ASSESSMENT & PLAN NOTE
Patient with Persistent (7 days or more) atrial fibrillation which is uncontrolled currently with    . Patient is currently in sinus rhythm.[id number] Score: 4. . Anticoagulation indicated. Anticoagulation done with lovenox as we could not get IV access for heparin  .    -Eliquis transitioned to Heparin in preparation for AKA.

## 2024-03-21 NOTE — ASSESSMENT & PLAN NOTE
Patient's FSGs are uncontrolled due to hyperglycemia on current medication regimen.  Last A1c reviewed-   Lab Results   Component Value Date    HGBA1C >14.0 (H) 03/05/2024     Most recent fingerstick glucose reviewed-   Recent Labs   Lab 03/20/24 2027 03/21/24  0413 03/21/24  0801 03/21/24  1215   POCTGLUCOSE 185* 227* 253* 249*       Current correctional scale  Low  Increase anti-hyperglycemic dose as follows-   Antihyperglycemics (From admission, onward)    Start     Stop Route Frequency Ordered    03/20/24 1645  insulin aspart U-100 pen 2-4 Units         -- SubQ with dinner 03/20/24 0644    03/20/24 1130  insulin aspart U-100 pen 3-5 Units         -- SubQ with lunch 03/20/24 0644    03/20/24 0715  insulin aspart U-100 pen 3-5 Units         -- SubQ with breakfast 03/20/24 0644    03/19/24 0900  insulin detemir U-100 (Levemir) pen 12 Units         -- SubQ 2 times daily 03/18/24 2143    03/11/24 1521  insulin aspart U-100 pen 0-5 Units         -- SubQ Before meals & nightly PRN 03/11/24 1520        Hold Oral hypoglycemics while patient is in the hospital.

## 2024-03-21 NOTE — SUBJECTIVE & OBJECTIVE
"Interval HPI:   Overnight events: No acute events overnight. Patient in room 8078/8078 A. Blood glucose stable. BG at and above goal on current insulin regimen (SSI, prandial, and basal insulin ). Steroid use- None.    Renal function- Abnormal - Creatinine 1.5  Vasopressors-  None       Endocrine will continue to follow and manage insulin orders inpatient.       Diet diabetic  Calorie  Diet NPO Except for: Medication     Eatin%  Nausea: No  Hypoglycemia and intervention: No  Fever: No  TPN and/or TF: No      BP (!) 116/54   Pulse 79   Temp 99.1 °F (37.3 °C) (Oral)   Resp 20   Ht 5' 9" (1.753 m)   Wt 120.2 kg (264 lb 15.9 oz)   SpO2 (!) 92%   BMI 39.13 kg/m²     Labs Reviewed and Include    Recent Labs   Lab 24  0338   *   CALCIUM 8.3*   ALBUMIN 1.7*   PROT 6.8      K 4.5   CO2 22*      BUN 34*   CREATININE 1.6*   ALKPHOS 67   ALT 9*   AST 21   BILITOT 0.2     Lab Results   Component Value Date    WBC 9.20 2024    HGB 7.6 (L) 2024    HCT 24.3 (L) 2024    MCV 78 (L) 2024     2024     No results for input(s): "TSH", "FREET4" in the last 168 hours.  Lab Results   Component Value Date    HGBA1C >14.0 (H) 2024       Nutritional status:   Body mass index is 39.13 kg/m².  Lab Results   Component Value Date    ALBUMIN 1.7 (L) 2024    ALBUMIN 1.8 (L) 2024    ALBUMIN 1.9 (L) 2024     Lab Results   Component Value Date    PREALBUMIN 17 (L) 2014    PREALBUMIN 16 (L) 2014    PREALBUMIN 19 (L) 2014       Estimated Creatinine Clearance: 51.1 mL/min (A) (based on SCr of 1.6 mg/dL (H)).    Accu-Checks  Recent Labs     24  0843 24  1218 24  1703 24  0810 24  1225 24  1629 24  0413   POCTGLUCOSE 216* 173* 180* 149* 195* 184* 237* 189* 185* 227*       Current Medications and/or Treatments Impacting Glycemic Control  Immunotherapy:  "   Immunosuppressants       None          Steroids:   Hormones (From admission, onward)      Start     Stop Route Frequency Ordered    03/05/24 1536  melatonin tablet 6 mg         -- Oral Nightly PRN 03/05/24 1437          Pressors:    Autonomic Drugs (From admission, onward)      None          Hyperglycemia/Diabetes Medications:   Antihyperglycemics (From admission, onward)      Start     Stop Route Frequency Ordered    03/20/24 1645  insulin aspart U-100 pen 2-4 Units         -- SubQ with dinner 03/20/24 0644    03/20/24 1130  insulin aspart U-100 pen 3-5 Units         -- SubQ with lunch 03/20/24 0644    03/20/24 0715  insulin aspart U-100 pen 3-5 Units         -- SubQ with breakfast 03/20/24 0644    03/19/24 0900  insulin detemir U-100 (Levemir) pen 12 Units         -- SubQ 2 times daily 03/18/24 2143    03/11/24 1521  insulin aspart U-100 pen 0-5 Units         -- SubQ Before meals & nightly PRN 03/11/24 1520

## 2024-03-21 NOTE — ASSESSMENT & PLAN NOTE
Patient with Proteus and enterobacter on leg cultures collected by Podiatry with concern for acute on chronic osteo. Fungal culture grew fusarium. He has started spiking fever and still is not agreeable to amputation, will do infectious work up to look out for source.    -UA noninfectious   -Blood culture NGTD  - Inpatient consult to ID and vascular surgery who plans to do AKA on 3/27.  - IV ertapenem with end date 4/15/24   - Added voriconazole for fusarium coverage which grew on fungal culture - 3 months  - midline catheters placed on admission for difficult IV access

## 2024-03-21 NOTE — PROGRESS NOTES
[facility] - Telemetry [facility] Medicine  Progress Note    Patient Name: Saji Castañeda  MRN: [id number]  Patient Class: IP- Inpatient   Admission Date: 3/5/2024  Length of Stay: 16 days  Attending Physician: Mahendra Collins III*  Primary Care Provider: [facility] -        Subjective:     Principal Problem:Osteomyelitis of left lower extremity        HPI:  Saji Castañeda is a 74 y.o. male with a medical history of DM2, HLD, HTN, chronic osteomyelitis of L heel, L TMA, PAD, hx of ESBL klebsiella, acinetobacter bacteremia, presenting with hyperglycemia. He presented to the ED via EMS and his POCT glucose on arrival was >500. Serum blood glucose 667 with anion gap of 17 and elevated ketones. Serum potassium 3.4, corrected sodium 149. Urinalysis significant for RBCs, glucosuria, and moderate bacteria and yeast. CBC revealed leukocytosis, elevated platelets, and mild anemia. CMP shows Na 135, K 3.4, BUN 29, Cr 2.1, Glucose 677, Lactate 4.61. BNP and troponin elevated. GFR 32.2. Insulin drip, IV fluids, zosyn, vancomycin, and potassium given.      He is unable to provide much history, but states that he has not been taking his home medications for at least a week. He reports shortness of breath, fatigue, and weakness for the last 6-7 days. He has chills and reports episodes of emesis. He denies abdominal pain, chest pain, palpitations, recent illness/infection, fevers, changes to bowel movements and urinary output. Patient lives with his brother and sister at home. He was last seen in the ED on 2/21 after a fall. He was hypoglycemic BGL 60 at that time which returned to normal after eating. He was also scheduled for a wound clinic appointment today 3/5 at [facility] with Dr. Wilson.        Overview/Hospital Course:  Wound care and cultures collected by Podiatry with recs for Vascular consult for amputation. Patient declined amputation. ID recommending IV Ertapenem for chronic osteomyelitis for 6 week  duration (EED: 4/15/24). With high wound care needs and IV antibiotics plan patient needing long-term placement. He is being treated with topical permethrin for bedbugs. Wound care has been dressing the wounds. 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 Still adjusting insulin to control sugars better. Giving fluids for YNES. Poor PO intake even with feeding assistance. LTAC authorization denied by [employer]. Waiting for SNF placement ([facility], awaiting auth and family to complete paperwork). Patient started spiking fever again and infectious work up done. ID re-consulted. Patient has been counseled multiple times that source control cannot be achieved without amputation. Patient agreeable to AKA scheduled for 3/27. Transitioned to heparin from eliquis. Concepciónely's placed for urinary rentention.       Interval History: Overnight heparin was stopped because of PPT following the nomogram and restarted after repeating labs           Objective:     Vital Signs (Most Recent):  Temp: 99.2 °F (37.3 °C) (03/21/24 1107)  Pulse: 105 (03/21/24 1111)  Resp: 16 (03/21/24 1107)  BP: (!) 156/69 (03/21/24 1107)  SpO2: (!) 91 % (03/21/24 1107) Vital Signs (24h Range):  Temp:  [99 °F (37.2 °C)-99.4 °F (37.4 °C)] 99.2 °F (37.3 °C)  Pulse:  [] 105  Resp:  [16-20] 16  SpO2:  [90 %-98 %] 91 %  BP: (116-156)/(53-69) 156/69     Weight: 120.2 kg (264 lb 15.9 oz)  Body mass index is 39.13 kg/m².    Intake/Output Summary (Last 24 hours) at 3/21/2024 1454  Last data filed at 3/21/2024 1200  Gross per 24 hour   Intake 360 ml   Output 2100 ml   Net -1740 ml         Physical Exam      Constitutional:       General: He is not in acute distress.     Appearance: Normal appearance. He is well-developed. He is ill-appearing . He is not diaphoretic but lethargic and slumped over.   HENT:      Head: Normocephalic and atraumatic.      Right Ear: External ear normal.      Left Ear: External ear  normal.      Nose: Nose normal.   Eyes:      General: No scleral icterus.        Right eye: No discharge.         Left eye: No discharge.      Conjunctiva/sclera: Conjunctivae normal.   Pulmonary:      Effort: Pulmonary effort is normal. No respiratory distress.      Breath sounds: No stridor.   Skin:     General: Skin is dry.      Comments: Bruising, lesion and rash present.      LE swelling B/L with thick discolored skin. Wound on plantar L footWounds of legs, back, buttocks   Neurological:      General: No focal deficit present.      Mental Status: He is alert and oriented to person, place, and time. Mental status is at baseline.   Psychiatric:         Mood and Affect: Mood normal.         Behavior: Behavior normal.         Thought Content: Thought content normal.         Judgment: Judgment normal.     Significant Labs: All pertinent labs within the past 24 hours have been reviewed.    Significant Imaging: I have reviewed all pertinent imaging results/findings within the past 24 hours.    Assessment/Plan:      * Osteomyelitis of left lower extremity  Patient with Proteus and enterobacter on leg cultures collected by Podiatry with concern for acute on chronic osteo. Fungal culture grew fusarium. He has started spiking fever and still is not agreeable to amputation, will do infectious work up to look out for source.    -UA noninfectious   -Blood culture NGTD  - Inpatient consult to ID and vascular surgery who plans to do AKA on 3/27.  - IV ertapenem with end date 4/15/24   - Added voriconazole for fusarium coverage which grew on fungal culture - 3 months  - midline catheters placed on admission for difficult IV access      Urinary retention  Patient developed new urinary retention.     -inpatient consult to urology  -Sams's placed   -Flomax  -Renal ultrasound      Localized swelling of right upper extremity  Patient has swelling and pain in the right arm and some swelling in the left arm as well    -US b/l upper  extremity for DVT showed clot, eliquis transitioned to Heparin    History of Bedbug bite with infection  Patient started on Permethrin daily for 5 days starting 3/12      Proteus infection    On ertapenem     Chronic anticoagulation  - See DVT      History of amputation of left high mid foot   See osteo      Stage 3b chronic kidney disease  Creatine stable for now. BMP reviewed- noted Estimated Creatinine Clearance: 51.1 mL/min (A) (based on SCr of 1.6 mg/dL (H)). according to latest data. Based on current GFR, CKD stage is stage 3 - GFR 30-59.  Monitor UOP and serial BMP and adjust therapy as needed. Renally dose meds. Avoid nephrotoxic medications and procedures.    Severe sepsis  This patient does have evidence of infective focus  My overall impression is sepsis.  Source: Skin and Soft Tissue (location left leg)  Antibiotics given-   Antibiotics (72h ago, onward)      Start     Stop Route Frequency Ordered    03/08/24 1230  ertapenem (INVANZ) 1 g in sodium chloride 0.9 % 100 mL IVPB (MB+)         -- IV Every 24 hours (non-standard times) 03/08/24 1115          Latest lactate reviewed-  Recent Labs   Lab 03/19/24  1307   LACTATE 1.2       Organ dysfunction indicated by Acute kidney injury    Fluid challenge Actual Body weight- Patient will receive 30ml/kg actual body weight to calculate fluid bolus for treatment of septic shock.     Post- resuscitation assessment No - Post resuscitation assessment not needed       Will Not start Pressors-     - See osteo      Other hyperlipidemia  Continue home atorvastatin      Diabetic ketoacidosis without coma associated with type 2 diabetes mellitus  Patient's FSGs are uncontrolled due to hyperglycemia on current medication regimen.  Last A1c reviewed-   Lab Results   Component Value Date    HGBA1C >14.0 (H) 03/05/2024     Most recent fingerstick glucose reviewed-   Recent Labs   Lab 03/20/24 2027 03/21/24  0413 03/21/24  0801 03/21/24  1215   POCTGLUCOSE 185* 227* 253* 249*        Current correctional scale   LD SSI  Maintain anti-hyperglycemic dose as follows-   Antihyperglycemics (From admission, onward)      Start     Stop Route Frequency Ordered    03/20/24 1645  insulin aspart U-100 pen 2-4 Units         -- SubQ with dinner 03/20/24 0644    03/20/24 1130  insulin aspart U-100 pen 3-5 Units         -- SubQ with lunch 03/20/24 0644    03/20/24 0715  insulin aspart U-100 pen 3-5 Units         -- SubQ with breakfast 03/20/24 0644    03/19/24 0900  insulin detemir U-100 (Levemir) pen 12 Units         -- SubQ 2 times daily 03/18/24 2143    03/11/24 1521  insulin aspart U-100 pen 0-5 Units         -- SubQ Before meals & nightly PRN 03/11/24 1520          Hold Oral hypoglycemics while patient is in the hospital.  Will keep patient on DKA pathway with insulin drip and fluid with protocol in place for switching to subcutaneous insulin as anion gap closes.      DKA  HHS  - Resolved  - Continue insulin regimen and BG checks TIDWM and QHS and 2am  - patient tends to become hypoglycemic around dinnertime  - could be insulin stacking from breakfast and lunch insulin aspart given poor PO intake despite assistance with feeding   - detemir 12 unit BID  - aspart scheduled w/ only breakfast and lunch and dinner with different units  -Inpatient consult to Endocrine    Paroxysmal atrial fibrillation  Patient with Persistent (7 days or more) atrial fibrillation which is uncontrolled currently with    . Patient is currently in sinus rhythm.[id number] Score: 4. . Anticoagulation indicated. Anticoagulation done with lovenox as we could not get IV access for heparin  .    -Eliquis transitioned to Heparin in preparation for AKA.    Chronic deep vein thrombosis (DVT) of right upper extremity  - eliquis transitioned to Heparin      Cellulitis of left lower leg  See osteo      Peripheral arterial disease  Resume plavix      YNES (acute kidney injury)  Patient with acute kidney injury/acute renal failure likely  due to pre-renal azotemia due to dehydration YNES is currently worsening. Baseline creatinine  1.1  - Labs reviewed- Renal function/electrolytes with Estimated Creatinine Clearance: 51.1 mL/min (A) (based on SCr of 1.6 mg/dL (H)). according to latest data. Monitor urine output and serial BMP and adjust therapy as needed. Avoid nephrotoxins and renally dose meds for GFR listed above.    Creatinine 2.1 on admit, baseline around 1.1  - Likely pre-renal from poor PO intake    Lab Results   Component Value Date    CREATININE 1.6 (H) 03/21/2024       Plan:   - giving 1 liter of LR  - Check urine lytes urine protein creatinine ratio if YNES does not improve w/ fluids  - Strict I&Os and daily weights   - Daily BMP  - Trend sCr  - Avoid nephrotoxic agents such as NSAIDs, ACEi, ARBs and IV radiocontrast.  - Renally dose meds to current GFR.  - Maintain MAP > 65.  - No indications for HD at this time.   - Maintain electrolytes at goal: Mg > 2, Phos > 3, K > 4.           Insulin dependent type 2 diabetes mellitus  Patient's FSGs are uncontrolled due to hyperglycemia on current medication regimen.  Last A1c reviewed-   Lab Results   Component Value Date    HGBA1C >14.0 (H) 03/05/2024     Most recent fingerstick glucose reviewed-   Recent Labs   Lab 03/20/24 2027 03/21/24  0413 03/21/24  0801 03/21/24  1215   POCTGLUCOSE 185* 227* 253* 249*       Current correctional scale  Low  Increase anti-hyperglycemic dose as follows-   Antihyperglycemics (From admission, onward)      Start     Stop Route Frequency Ordered    03/20/24 1645  insulin aspart U-100 pen 2-4 Units         -- SubQ with dinner 03/20/24 0644    03/20/24 1130  insulin aspart U-100 pen 3-5 Units         -- SubQ with lunch 03/20/24 0644    03/20/24 0715  insulin aspart U-100 pen 3-5 Units         -- SubQ with breakfast 03/20/24 0644    03/19/24 0900  insulin detemir U-100 (Levemir) pen 12 Units         -- SubQ 2 times daily 03/18/24 2143    03/11/24 1521  insulin aspart  U-100 pen 0-5 Units         -- SubQ Before meals & nightly PRN 03/11/24 1520          Hold Oral hypoglycemics while patient is in the hospital.    Essential hypertension  Chronic, uncontrolled. Latest blood pressure and vitals reviewed-     Temp:  [99 °F (37.2 °C)-99.4 °F (37.4 °C)]   Pulse:  []   Resp:  [16-20]   BP: (116-156)/(53-69)   SpO2:  [90 %-98 %] .   Home meds for hypertension were reviewed and noted below.   Hypertension Medications               furosemide (LASIX) 40 MG tablet Take 20 mg by mouth.    hydrALAZINE (APRESOLINE) 100 MG tablet Take 1 tablet (100 mg total) by mouth every 8 (eight) hours.    isosorbide dinitrate (ISORDIL) 10 MG tablet Take 1 tablet (10 mg total) by mouth 3 (three) times daily.    NIFEdipine (PROCARDIA-XL) 60 MG (OSM) 24 hr tablet Take 1 tablet (60 mg total) by mouth once daily.            While in the hospital, will manage blood pressure as follows; Adjust home antihypertensive regimen as follows- will keep nifedipine - increased to 90     Will utilize p.r.n. blood pressure medication only if patient's blood pressure greater than 180/110 and he develops symptoms such as worsening chest pain or shortness of breath.      VTE Risk Mitigation (From admission, onward)           Ordered     heparin 25,000 units in dextrose 5% (100 units/ml) IV bolus from bag HIGH INTENSITY nomogram - [facility]  As needed (PRN)        Question:  Heparin Infusion Adjustment (DO NOT MODIFY ANSWER)  Answer:  \\[email or web address]\epic\Images\Pharmacy\HeparinInfusions\heparin HIGH INTENSITY nomogram for [facility] [id number]    03/20/24 0749     heparin 25,000 units in dextrose 5% (100 units/ml) IV bolus from bag HIGH INTENSITY nomogram - [facility]  As needed (PRN)        Question:  Heparin Infusion Adjustment (DO NOT MODIFY ANSWER)  Answer:  \\[email or web address]\epic\Images\Pharmacy\HeparinInfusions\heparin HIGH INTENSITY nomogram for [facility] [id number].pdf    03/20/24 0762     heparin 25,000 units in dextrose 5% 250 mL (100 units/mL)  infusion HIGH INTENSITY nomogram - [facility]  Continuous        Question:  Begin at (units/kg/hr)  Answer:  18    03/20/24 0747                    Discharge Planning   OXANA: 3/29/2024     Code Status: Full Code   Is the patient medically ready for discharge?: No    Reason for patient still in hospital (select all that apply): Treatment  Discharge Plan A: Skilled Nursing Facility   Discharge Delays: None known at this time              Lynette Perkins MD  Department of Hospital Medicine   [facility] - Telemetry Stepdown

## 2024-03-21 NOTE — ASSESSMENT & PLAN NOTE
Patient with acute kidney injury/acute renal failure likely due to pre-renal azotemia due to dehydration YNES is currently worsening. Baseline creatinine  1.1  - Labs reviewed- Renal function/electrolytes with Estimated Creatinine Clearance: 51.1 mL/min (A) (based on SCr of 1.6 mg/dL (H)). according to latest data. Monitor urine output and serial BMP and adjust therapy as needed. Avoid nephrotoxins and renally dose meds for GFR listed above.    Creatinine 2.1 on admit, baseline around 1.1  - Likely pre-renal from poor PO intake    Lab Results   Component Value Date    CREATININE 1.6 (H) 03/21/2024       Plan:   - giving 1 liter of LR  - Check urine lytes urine protein creatinine ratio if YNES does not improve w/ fluids  - Strict I&Os and daily weights   - Daily BMP  - Trend sCr  - Avoid nephrotoxic agents such as NSAIDs, ACEi, ARBs and IV radiocontrast.  - Renally dose meds to current GFR.  - Maintain MAP > 65.  - No indications for HD at this time.   - Maintain electrolytes at goal: Mg > 2, Phos > 3, K > 4.

## 2024-03-21 NOTE — PLAN OF CARE
Pt in bed, no s/s of acute distress, no c/o voiced, safety measures in place, call light in reach  Problem: Adult Inpatient Plan of Care  Goal: Plan of Care Review  Outcome: Ongoing, Progressing  Goal: Absence of Hospital-Acquired Illness or Injury  Outcome: Ongoing, Progressing  Goal: Optimal Comfort and Wellbeing  Outcome: Ongoing, Progressing  Goal: Readiness for Transition of Care  Outcome: Ongoing, Progressing     Problem: Diabetes Comorbidity  Goal: Blood Glucose Level Within Targeted Range  Outcome: Ongoing, Progressing     Problem: Adjustment to Illness (Sepsis/Septic Shock)  Goal: Optimal Coping  Outcome: Ongoing, Progressing     Problem: Bleeding (Sepsis/Septic Shock)  Goal: Absence of Bleeding  Outcome: Ongoing, Progressing     Problem: Glycemic Control Impaired (Sepsis/Septic Shock)  Goal: Blood Glucose Level Within Desired Range  Outcome: Ongoing, Progressing     Problem: Infection Progression (Sepsis/Septic Shock)  Goal: Absence of Infection Signs and Symptoms  Outcome: Ongoing, Progressing     Problem: Nutrition Impaired (Sepsis/Septic Shock)  Goal: Optimal Nutrition Intake  Outcome: Ongoing, Progressing     Problem: Infection  Goal: Absence of Infection Signs and Symptoms  Outcome: Ongoing, Progressing     Problem: Impaired Wound Healing  Goal: Optimal Wound Healing  Outcome: Ongoing, Progressing     Problem: Skin Injury Risk Increased  Goal: Skin Health and Integrity  Outcome: Ongoing, Progressing     Problem: Fall Injury Risk  Goal: Absence of Fall and Fall-Related Injury  Outcome: Ongoing, Progressing     Problem: Fluid and Electrolyte Imbalance (Acute Kidney Injury/Impairment)  Goal: Fluid and Electrolyte Balance  Outcome: Ongoing, Progressing     Problem: Oral Intake Inadequate (Acute Kidney Injury/Impairment)  Goal: Optimal Nutrition Intake  Outcome: Ongoing, Progressing     Problem: Renal Function Impairment (Acute Kidney Injury/Impairment)  Goal: Effective Renal Function  Outcome: Ongoing,  Progressing

## 2024-03-21 NOTE — ASSESSMENT & PLAN NOTE
Patient's FSGs are uncontrolled due to hyperglycemia on current medication regimen.  Last A1c reviewed-   Lab Results   Component Value Date    HGBA1C >14.0 (H) 03/05/2024     Most recent fingerstick glucose reviewed-   Recent Labs   Lab 03/20/24 2027 03/21/24  0413 03/21/24  0801 03/21/24  1215   POCTGLUCOSE 185* 227* 253* 249*       Current correctional scale   LD SSI  Maintain anti-hyperglycemic dose as follows-   Antihyperglycemics (From admission, onward)    Start     Stop Route Frequency Ordered    03/20/24 1645  insulin aspart U-100 pen 2-4 Units         -- SubQ with dinner 03/20/24 0644    03/20/24 1130  insulin aspart U-100 pen 3-5 Units         -- SubQ with lunch 03/20/24 0644    03/20/24 0715  insulin aspart U-100 pen 3-5 Units         -- SubQ with breakfast 03/20/24 0644    03/19/24 0900  insulin detemir U-100 (Levemir) pen 12 Units         -- SubQ 2 times daily 03/18/24 2143    03/11/24 1521  insulin aspart U-100 pen 0-5 Units         -- SubQ Before meals & nightly PRN 03/11/24 1520        Hold Oral hypoglycemics while patient is in the hospital.  Will keep patient on DKA pathway with insulin drip and fluid with protocol in place for switching to subcutaneous insulin as anion gap closes.      DKA  HHS  - Resolved  - Continue insulin regimen and BG checks TIDWM and QHS and 2am  - patient tends to become hypoglycemic around dinnertime  - could be insulin stacking from breakfast and lunch insulin aspart given poor PO intake despite assistance with feeding   - detemir 12 unit BID  - aspart scheduled w/ only breakfast and lunch and dinner with different units  -Inpatient consult to Endocrine

## 2024-03-21 NOTE — SUBJECTIVE & OBJECTIVE
Interval History: Overnight heparin was stopped because of PPT following the nomogram and restarted after repeating labs           Objective:     Vital Signs (Most Recent):  Temp: 99.2 °F (37.3 °C) (03/21/24 1107)  Pulse: 105 (03/21/24 1111)  Resp: 16 (03/21/24 1107)  BP: (!) 156/69 (03/21/24 1107)  SpO2: (!) 91 % (03/21/24 1107) Vital Signs (24h Range):  Temp:  [99 °F (37.2 °C)-99.4 °F (37.4 °C)] 99.2 °F (37.3 °C)  Pulse:  [] 105  Resp:  [16-20] 16  SpO2:  [90 %-98 %] 91 %  BP: (116-156)/(53-69) 156/69     Weight: 120.2 kg (264 lb 15.9 oz)  Body mass index is 39.13 kg/m².    Intake/Output Summary (Last 24 hours) at 3/21/2024 1454  Last data filed at 3/21/2024 1200  Gross per 24 hour   Intake 360 ml   Output 2100 ml   Net -1740 ml         Physical Exam      Constitutional:       General: He is not in acute distress.     Appearance: Normal appearance. He is well-developed. He is ill-appearing . He is not diaphoretic but lethargic and slumped over.   HENT:      Head: Normocephalic and atraumatic.      Right Ear: External ear normal.      Left Ear: External ear normal.      Nose: Nose normal.   Eyes:      General: No scleral icterus.        Right eye: No discharge.         Left eye: No discharge.      Conjunctiva/sclera: Conjunctivae normal.   Pulmonary:      Effort: Pulmonary effort is normal. No respiratory distress.      Breath sounds: No stridor.   Skin:     General: Skin is dry.      Comments: Bruising, lesion and rash present.      LE swelling B/L with thick discolored skin. Wound on plantar L footWounds of legs, back, buttocks   Neurological:      General: No focal deficit present.      Mental Status: He is alert and oriented to person, place, and time. Mental status is at baseline.   Psychiatric:         Mood and Affect: Mood normal.         Behavior: Behavior normal.         Thought Content: Thought content normal.         Judgment: Judgment normal.     Significant Labs: All pertinent labs within the  past 24 hours have been reviewed.    Significant Imaging: I have reviewed all pertinent imaging results/findings within the past 24 hours.

## 2024-03-21 NOTE — ASSESSMENT & PLAN NOTE
Chronic, uncontrolled. Latest blood pressure and vitals reviewed-     Temp:  [99 °F (37.2 °C)-99.4 °F (37.4 °C)]   Pulse:  []   Resp:  [16-20]   BP: (116-156)/(53-69)   SpO2:  [90 %-98 %] .   Home meds for hypertension were reviewed and noted below.   Hypertension Medications               furosemide (LASIX) 40 MG tablet Take 20 mg by mouth.    hydrALAZINE (APRESOLINE) 100 MG tablet Take 1 tablet (100 mg total) by mouth every 8 (eight) hours.    isosorbide dinitrate (ISORDIL) 10 MG tablet Take 1 tablet (10 mg total) by mouth 3 (three) times daily.    NIFEdipine (PROCARDIA-XL) 60 MG (OSM) 24 hr tablet Take 1 tablet (60 mg total) by mouth once daily.            While in the hospital, will manage blood pressure as follows; Adjust home antihypertensive regimen as follows- will keep nifedipine - increased to 90     Will utilize p.r.n. blood pressure medication only if patient's blood pressure greater than 180/110 and he develops symptoms such as worsening chest pain or shortness of breath.

## 2024-03-21 NOTE — PROGRESS NOTES
"[facility] - Telemetry Stepdown  Adult Nutrition  Progress Note    SUMMARY       Recommendations    Continue Diabetic diet + Boost Glucose Control ONS - encourage PO intake as tolerated.  RD following.    Goals: Meet % EEN, EPN by RD f/u date  Nutrition Goal Status: progressing towards goal  Communication of RD Recs: reviewed with RN    Assessment and Plan    Nutrition Problem:  Excessive CHO intake     Related to (etiology):   Food and nutrition related knowledge deficit      Signs and Symptoms (as evidenced by):   A1C >14     Interventions(treatment strategy):  Collaboration of nutrition care w/ other providers      Nutrition Diagnosis Status:   Continues    Reason for Assessment    Reason For Assessment: RD follow-up  Diagnosis: other (see comments) (Osteomyelitis)  Relevant Medical History: DM  Interdisciplinary Rounds: did not attend    General Information Comments: Pt noted w/ decreased appetite - ONS ordered. PTA, pt w/ good appetite & stable weight. Diabetic diet education complete 3/6.  Nutrition Discharge Planning: Adequate PO intake    Nutrition/Diet History    Patient Reported Diet/Restrictions/Preferences: general  Spiritual, Cultural Beliefs, Orthodox Practices, Values that Affect Care: no  Factors Affecting Nutritional Intake: decreased appetite    Anthropometrics    Temp: 99.2 °F (37.3 °C)  Height Method: Stated  Height: 5' 9" (175.3 cm)  Height (inches): 69 in  Weight Method: Bed Scale  Weight: 120.2 kg (264 lb 15.9 oz)  Weight (lb): 265 lb  Ideal Body Weight (IBW), Male: 160 lb  % Ideal Body Weight, Male (lb): 165.63 %  BMI (Calculated): 39.1  BMI Grade: 35 - 39.9 - obesity - grade II    Lab/Procedures/Meds    Pertinent Labs Reviewed: reviewed  Pertinent Labs Comments: Creat 1.6, GFR 44.7, A1C >14  Pertinent Medications Reviewed: reviewed  Pertinent Medications Comments: Heparin, Statin    Estimated/Assessed Needs    Weight Used For Calorie Calculations: 120.2 kg (264 lb 15.9 oz)    Energy " Calorie Requirements (kcal): 1927 kcal/d  Energy Need Method: [place]-St Jeor (1.0 PAL)    Protein Requirements: 108 g/d (.9 g/kg)  Weight Used For Protein Calculations: 120.2 kg (264 lb 15.9 oz)    Estimated Fluid Requirement Method: other (see comments) (Per MD)  RDA Method (mL): 1927    Nutrition Prescription Ordered    Current Diet Order: 2000 kcal ADA  Oral Nutrition Supplement: Boost Glucose Control    Evaluation of Received Nutrient/Fluid Intake    I/O: -3.4L since 3/7    Comments: LBM: 3/20    Tolerance: tolerating    Nutrition Risk    Level of Risk/Frequency of Follow-up:  (1x/week)     Monitor and Evaluation    Food and Nutrient Intake: food and beverage intake, energy intake  Food and Nutrient Adminstration: diet order  Physical Activity and Function: nutrition-related ADLs and IADLs  Anthropometric Measurements: weight, weight change  Biochemical Data, Medical Tests and Procedures: inflammatory profile, lipid profile, glucose/endocrine profile, gastrointestinal profile, electrolyte and renal panel  Nutrition-Focused Physical Findings: overall appearance     Nutrition Follow-Up    RD Follow-up?: Yes

## 2024-03-21 NOTE — ASSESSMENT & PLAN NOTE
Creatine stable for now. BMP reviewed- noted Estimated Creatinine Clearance: 51.1 mL/min (A) (based on SCr of 1.6 mg/dL (H)). according to latest data. Based on current GFR, CKD stage is stage 3 - GFR 30-59.  Monitor UOP and serial BMP and adjust therapy as needed. Renally dose meds. Avoid nephrotoxic medications and procedures.

## 2024-03-21 NOTE — PROGRESS NOTES
[facility] - Telemetry Stepdown  Endocrinology  Progress Note    Admit Date: 3/5/2024     Reason for Consult: Management of T2DM, Hyperglycemia     Diabetes diagnosis year: > 25 years    Home Diabetes Medications:  Novolog 6 units w/ meals and Lantus 45 units nightly (states he is not taking).     How often checking glucose at home?  Not really checking    BG readings on regimen: 200's  Hypoglycemia on the regimen?  Yes (When he was taking the Lantus)  Missed doses on regimen?  Yes    Diabetes Complications include:     Hyperglycemia, Hypoglycemia , Diabetic chronic kidney disease     , Diabetic dermatitis, Foot ulcer  , and Periodontal disease    Complicating diabetes co morbidities:   HTN; HLD      HPI: Saji Castañeda is a 75 yo M with a history of Afib, T2D, HLD, HTN, chronic osteomyelitis of L heel, s/p L TMA, PID, ESBL Klebsiella and Acinetobacter bacteremia who was admitted after being found down at home and DKA. Endocrine consulted to manage hyperglycemia and type 2 diabetes.   Hemoglobin A1C   Date Value Ref Range Status   03/05/2024 >14.0 (H) 4.0 - 5.6 % Final     Comment:     ADA Screening Guidelines:  5.7-6.4%  Consistent with prediabetes  >or=6.5%  Consistent with diabetes    High levels of fetal hemoglobin interfere with the HbA1C  assay. Heterozygous hemoglobin variants (HbS, HgC, etc)do  not significantly interfere with this assay.   However, presence of multiple variants may affect accuracy.     09/13/2023 11.0 (H) 4.5 - 5.7 % Final   03/10/2023 8.4 (H) 4.0 - 5.6 % Final     Comment:     ADA Screening Guidelines:  5.7-6.4%  Consistent with prediabetes  >or=6.5%  Consistent with diabetes    High levels of fetal hemoglobin interfere with the HbA1C  assay. Heterozygous hemoglobin variants (HbS, HgC, etc)do  not significantly interfere with this assay.   However, presence of multiple variants may affect accuracy.     09/17/2022 9.9 (H) 4.0 - 5.6 % Final     Comment:     ADA Screening Guidelines:  5.7-6.4%   "Consistent with prediabetes  >or=6.5%  Consistent with diabetes    High levels of fetal hemoglobin interfere with the HbA1C  assay. Heterozygous hemoglobin variants (HbS, HgC, etc)do  not significantly interfere with this assay.   However, presence of multiple variants may affect accuracy.              Interval HPI:   Overnight events: No acute events overnight. Patient in room 8078/8078 A. Blood glucose stable. BG at and above goal on current insulin regimen (SSI, prandial, and basal insulin ). Steroid use- None.    Renal function- Abnormal - Creatinine 1.5  Vasopressors-  None       Endocrine will continue to follow and manage insulin orders inpatient.       Diet diabetic  Calorie  Diet NPO Except for: Medication     Eatin%  Nausea: No  Hypoglycemia and intervention: No  Fever: No  TPN and/or TF: No      BP (!) 116/54   Pulse 79   Temp 99.1 °F (37.3 °C) (Oral)   Resp 20   Ht 5' 9" (1.753 m)   Wt 120.2 kg (264 lb 15.9 oz)   SpO2 (!) 92%   BMI 39.13 kg/m²     Labs Reviewed and Include    Recent Labs   Lab 24  0338   *   CALCIUM 8.3*   ALBUMIN 1.7*   PROT 6.8      K 4.5   CO2 22*      BUN 34*   CREATININE 1.6*   ALKPHOS 67   ALT 9*   AST 21   BILITOT 0.2     Lab Results   Component Value Date    WBC 9.20 2024    HGB 7.6 (L) 2024    HCT 24.3 (L) 2024    MCV 78 (L) 2024     2024     No results for input(s): "TSH", "FREET4" in the last 168 hours.  Lab Results   Component Value Date    HGBA1C >14.0 (H) 2024       Nutritional status:   Body mass index is 39.13 kg/m².  Lab Results   Component Value Date    ALBUMIN 1.7 (L) 2024    ALBUMIN 1.8 (L) 2024    ALBUMIN 1.9 (L) 2024     Lab Results   Component Value Date    PREALBUMIN 17 (L) 2014    PREALBUMIN 16 (L) 2014    PREALBUMIN 19 (L) 2014       Estimated Creatinine Clearance: 51.1 mL/min (A) (based on SCr of 1.6 mg/dL (H)).    Accu-Checks  Recent Labs "     03/19/24  0843 03/19/24  1218 03/19/24  1703 03/19/24  2053 03/20/24  0810 03/20/24  1225 03/20/24  1629 03/20/24 1957 03/20/24 2027 03/21/24  0413   POCTGLUCOSE 216* 173* 180* 149* 195* 184* 237* 189* 185* 227*       Current Medications and/or Treatments Impacting Glycemic Control  Immunotherapy:    Immunosuppressants       None          Steroids:   Hormones (From admission, onward)      Start     Stop Route Frequency Ordered    03/05/24 1536  melatonin tablet 6 mg         -- Oral Nightly PRN 03/05/24 1437          Pressors:    Autonomic Drugs (From admission, onward)      None          Hyperglycemia/Diabetes Medications:   Antihyperglycemics (From admission, onward)      Start     Stop Route Frequency Ordered    03/20/24 1645  insulin aspart U-100 pen 2-4 Units         -- SubQ with dinner 03/20/24 0644    03/20/24 1130  insulin aspart U-100 pen 3-5 Units         -- SubQ with lunch 03/20/24 0644    03/20/24 0715  insulin aspart U-100 pen 3-5 Units         -- SubQ with breakfast 03/20/24 0644    03/19/24 0900  insulin detemir U-100 (Levemir) pen 12 Units         -- SubQ 2 times daily 03/18/24 2143    03/11/24 1521  insulin aspart U-100 pen 0-5 Units         -- SubQ Before meals & nightly PRN 03/11/24 1520            ASSESSMENT and PLAN    ID  * Osteomyelitis of left lower extremity  Optimize BG control to improve wound healing        Endocrine  Diabetic ketoacidosis without coma associated with type 2 diabetes mellitus  Resolved.         Insulin dependent type 2 diabetes mellitus  Endocrinology consulted for BG management.   BG goal 140-180    WBD 0.3 units/kg/day  - Levemir (Insulin Detemir) 12 units BID  - Novolog (Insulin Aspart) 3-5 units with breakfast and lunch and 2-4 units with dinner and prn for BG excursions LDC SSI (150/50) (range added due to fluctuating appetite)  - BG checks AC/HS  - Hypoglycemia protocol in place  - If blood glucose greater than 300, please ask patient not to eat food or drink  anything other than water until correctional insulin has brought it back below 250    ** Please notify Endocrine for any change and/or advance in diet**  ** Please call Endocrine for any BG related issues **    Discharge Planning:   TBD. Please notify endocrinology prior to discharge.             Chaka Dominguez, DNP, FNP  Endocrinology  [facility] - Telemetry Stepdown

## 2024-03-22 LAB
ALBUMIN SERPL BCP-MCNC: 2 G/DL (ref 3.5–5.2)
ALP SERPL-CCNC: 80 U/L (ref 55–135)
ALT SERPL W/O P-5'-P-CCNC: 9 U/L (ref 10–44)
ANION GAP SERPL CALC-SCNC: 7 MMOL/L (ref 8–16)
APTT PPP: 54.3 SEC (ref 21–32)
APTT PPP: 68.9 SEC (ref 21–32)
AST SERPL-CCNC: 25 U/L (ref 10–40)
BASOPHILS # BLD AUTO: 0.03 K/UL (ref 0–0.2)
BASOPHILS NFR BLD: 0.4 % (ref 0–1.9)
BILIRUB SERPL-MCNC: 0.3 MG/DL (ref 0.1–1)
BUN SERPL-MCNC: 31 MG/DL (ref 8–23)
CALCIUM SERPL-MCNC: 7.9 MG/DL (ref 8.7–10.5)
CHLORIDE SERPL-SCNC: 105 MMOL/L (ref 95–110)
CO2 SERPL-SCNC: 26 MMOL/L (ref 23–29)
CREAT SERPL-MCNC: 1.6 MG/DL (ref 0.5–1.4)
DIFFERENTIAL METHOD BLD: ABNORMAL
EOSINOPHIL # BLD AUTO: 0.2 K/UL (ref 0–0.5)
EOSINOPHIL NFR BLD: 2.8 % (ref 0–8)
ERYTHROCYTE [DISTWIDTH] IN BLOOD BY AUTOMATED COUNT: 16.5 % (ref 11.5–14.5)
EST. GFR  (NO RACE VARIABLE): 44.7 ML/MIN/1.73 M^2
GLUCOSE SERPL-MCNC: 236 MG/DL (ref 70–110)
HCT VFR BLD AUTO: 24.3 % (ref 40–54)
HGB BLD-MCNC: 7.2 G/DL (ref 14–18)
IMM GRANULOCYTES # BLD AUTO: 0.02 K/UL (ref 0–0.04)
IMM GRANULOCYTES NFR BLD AUTO: 0.3 % (ref 0–0.5)
LYMPHOCYTES # BLD AUTO: 1.3 K/UL (ref 1–4.8)
LYMPHOCYTES NFR BLD: 17.8 % (ref 18–48)
MCH RBC QN AUTO: 23.8 PG (ref 27–31)
MCHC RBC AUTO-ENTMCNC: 29.6 G/DL (ref 32–36)
MCV RBC AUTO: 80 FL (ref 82–98)
MONOCYTES # BLD AUTO: 0.5 K/UL (ref 0.3–1)
MONOCYTES NFR BLD: 7.4 % (ref 4–15)
NEUTROPHILS # BLD AUTO: 5.2 K/UL (ref 1.8–7.7)
NEUTROPHILS NFR BLD: 71.3 % (ref 38–73)
NRBC BLD-RTO: 0 /100 WBC
OHS QRS DURATION: 94 MS
OHS QTC CALCULATION: 435 MS
PLATELET # BLD AUTO: 533 K/UL (ref 150–450)
PMV BLD AUTO: 9.3 FL (ref 9.2–12.9)
POCT GLUCOSE: 169 MG/DL (ref 70–110)
POCT GLUCOSE: 185 MG/DL (ref 70–110)
POCT GLUCOSE: 238 MG/DL (ref 70–110)
POCT GLUCOSE: 286 MG/DL (ref 70–110)
POCT GLUCOSE: 312 MG/DL (ref 70–110)
POTASSIUM SERPL-SCNC: 4.5 MMOL/L (ref 3.5–5.1)
PROT SERPL-MCNC: 6.7 G/DL (ref 6–8.4)
RBC # BLD AUTO: 3.03 M/UL (ref 4.6–6.2)
SODIUM SERPL-SCNC: 138 MMOL/L (ref 136–145)
WBC # BLD AUTO: 7.26 K/UL (ref 3.9–12.7)

## 2024-03-22 PROCEDURE — 20600001 HC STEP DOWN PRIVATE ROOM

## 2024-03-22 PROCEDURE — 99232 SBSQ HOSP IP/OBS MODERATE 35: CPT | Mod: ,,, | Performed by: NURSE PRACTITIONER

## 2024-03-22 PROCEDURE — 93005 ELECTROCARDIOGRAM TRACING: CPT

## 2024-03-22 PROCEDURE — 97530 THERAPEUTIC ACTIVITIES: CPT | Mod: CO

## 2024-03-22 PROCEDURE — 27000207 HC ISOLATION

## 2024-03-22 PROCEDURE — 63600175 PHARM REV CODE 636 W HCPCS

## 2024-03-22 PROCEDURE — 25000003 PHARM REV CODE 250

## 2024-03-22 PROCEDURE — 25000003 PHARM REV CODE 250: Performed by: PHYSICIAN ASSISTANT

## 2024-03-22 PROCEDURE — A4216 STERILE WATER/SALINE, 10 ML: HCPCS

## 2024-03-22 PROCEDURE — 36415 COLL VENOUS BLD VENIPUNCTURE: CPT | Performed by: FAMILY MEDICINE

## 2024-03-22 PROCEDURE — 80053 COMPREHEN METABOLIC PANEL: CPT

## 2024-03-22 PROCEDURE — 93010 ELECTROCARDIOGRAM REPORT: CPT | Mod: ,,, | Performed by: INTERNAL MEDICINE

## 2024-03-22 PROCEDURE — 85025 COMPLETE CBC W/AUTO DIFF WBC: CPT

## 2024-03-22 PROCEDURE — 97535 SELF CARE MNGMENT TRAINING: CPT | Mod: CO

## 2024-03-22 PROCEDURE — 63600175 PHARM REV CODE 636 W HCPCS: Performed by: PHYSICIAN ASSISTANT

## 2024-03-22 PROCEDURE — 97530 THERAPEUTIC ACTIVITIES: CPT

## 2024-03-22 PROCEDURE — 36415 COLL VENOUS BLD VENIPUNCTURE: CPT | Mod: XB

## 2024-03-22 PROCEDURE — 97112 NEUROMUSCULAR REEDUCATION: CPT

## 2024-03-22 PROCEDURE — 85730 THROMBOPLASTIN TIME PARTIAL: CPT | Mod: 91 | Performed by: FAMILY MEDICINE

## 2024-03-22 RX ADMIN — Medication 10 ML: at 01:03

## 2024-03-22 RX ADMIN — Medication 10 ML: at 06:03

## 2024-03-22 RX ADMIN — GABAPENTIN 300 MG: 300 CAPSULE ORAL at 08:03

## 2024-03-22 RX ADMIN — INSULIN ASPART 4 UNITS: 100 INJECTION, SOLUTION INTRAVENOUS; SUBCUTANEOUS at 01:03

## 2024-03-22 RX ADMIN — ERTAPENEM 1 G: 1 INJECTION INTRAMUSCULAR; INTRAVENOUS at 01:03

## 2024-03-22 RX ADMIN — INSULIN ASPART 3 UNITS: 100 INJECTION, SOLUTION INTRAVENOUS; SUBCUTANEOUS at 01:03

## 2024-03-22 RX ADMIN — HEPARIN SODIUM AND DEXTROSE 17 UNITS/KG/HR: 10000; 5 INJECTION INTRAVENOUS at 07:03

## 2024-03-22 RX ADMIN — Medication 10 ML: at 12:03

## 2024-03-22 RX ADMIN — INSULIN ASPART 2 UNITS: 100 INJECTION, SOLUTION INTRAVENOUS; SUBCUTANEOUS at 06:03

## 2024-03-22 RX ADMIN — Medication 10 ML: at 05:03

## 2024-03-22 RX ADMIN — Medication 6 MG: at 08:03

## 2024-03-22 RX ADMIN — VORICONAZOLE 200 MG: 200 TABLET, FILM COATED ORAL at 08:03

## 2024-03-22 NOTE — NURSING
"APTT level is therapeutic. No changes to heparin drip.    Patient is refusing his medications at this time. Asking staff to "come back later." Attempt made to educate patient. Patient appears confused, asking for his wheelchair from across the room; wheelchair is not in the room.     Will continue to monitor, will continue with plan of care.   "

## 2024-03-22 NOTE — ASSESSMENT & PLAN NOTE
Patient with acute kidney injury/acute renal failure likely due to pre-renal azotemia due to dehydration YNES is currently worsening. Baseline creatinine  1.1  - Labs reviewed- Renal function/electrolytes with Estimated Creatinine Clearance: 51.1 mL/min (A) (based on SCr of 1.6 mg/dL (H)). according to latest data. Monitor urine output and serial BMP and adjust therapy as needed. Avoid nephrotoxins and renally dose meds for GFR listed above.    Creatinine 2.1 on admit, baseline around 1.1  - Likely pre-renal from poor PO intake    Lab Results   Component Value Date    CREATININE 1.6 (H) 03/22/2024       Plan:   - giving 1 liter of LR  - Check urine lytes urine protein creatinine ratio if YNES does not improve w/ fluids  - Strict I&Os and daily weights   - Daily BMP  - Trend sCr  - Avoid nephrotoxic agents such as NSAIDs, ACEi, ARBs and IV radiocontrast.  - Renally dose meds to current GFR.  - Maintain MAP > 65.  - No indications for HD at this time.   - Maintain electrolytes at goal: Mg > 2, Phos > 3, K > 4.

## 2024-03-22 NOTE — ASSESSMENT & PLAN NOTE
Chronic, uncontrolled. Latest blood pressure and vitals reviewed-     Temp:  [97.8 °F (36.6 °C)-99.4 °F (37.4 °C)]   Pulse:  []   Resp:  [16-20]   BP: (126-153)/(59-73)   SpO2:  [90 %-93 %] .   Home meds for hypertension were reviewed and noted below.   Hypertension Medications               furosemide (LASIX) 40 MG tablet Take 20 mg by mouth.    hydrALAZINE (APRESOLINE) 100 MG tablet Take 1 tablet (100 mg total) by mouth every 8 (eight) hours.    isosorbide dinitrate (ISORDIL) 10 MG tablet Take 1 tablet (10 mg total) by mouth 3 (three) times daily.    NIFEdipine (PROCARDIA-XL) 60 MG (OSM) 24 hr tablet Take 1 tablet (60 mg total) by mouth once daily.            While in the hospital, will manage blood pressure as follows; Adjust home antihypertensive regimen as follows- will keep nifedipine - increased to 90     Will utilize p.r.n. blood pressure medication only if patient's blood pressure greater than 180/110 and he develops symptoms such as worsening chest pain or shortness of breath.

## 2024-03-22 NOTE — PT/OT/SLP PROGRESS
Occupational Therapy   Co-Treatment with PT  Co-treatment performed due to patient's multiple deficits requiring two skilled therapists to appropriately and safely assess patient's strength and endurance while facilitating functional tasks in addition to accommodating for patient's activity tolerance.      Name: Saji Castañeda  MRN: [id number]  Admitting Diagnosis:  Osteomyelitis of left lower extremity       Recommendations:     Discharge Recommendations: Moderate Intensity Therapy  Discharge Equipment Recommendations:  lift device, hospital bed  Barriers to discharge:  Other (Comment) (increased skilled assistance required)    Assessment:     Saji Castañeda is a 74 y.o. male with a medical diagnosis of Osteomyelitis of left lower extremity.  He presents with the following performance deficits affecting function are weakness, impaired functional mobility, decreased safety awareness, impaired cognition, impaired endurance, gait instability, decreased coordination, pain, impaired skin, edema, decreased ROM, decreased lower extremity function, impaired self care skills, impaired balance, decreased upper extremity function, orthopedic precautions.   Pt agreeable to tx session. He appears to demonstrate confusion as well as used profanity during session possibly due to his current status/frustration. He required verbal cues and significant 2-person assistance for all bed mobility and sitting balance EOB.    Rehab Prognosis:  Poor; patient would benefit from acute skilled OT services to address these deficits and reach maximum level of function.       Plan:     Patient to be seen 2 x/week to address the above listed problems via self-care/home management, therapeutic activities, therapeutic exercises, neuromuscular re-education  Plan of Care Expires: 04/06/24  Plan of Care Reviewed with: patient    Subjective     Chief Complaint: LE pain   Patient/Family Comments/goals: I want to sit up, pt states upon therapist arrival in  room  Pain/Comfort:  Location - Side 1: Left  Location - Orientation 1: generalized  Location 1: leg  Pain Addressed 1: Reposition, Distraction, Cessation of Activity, Nurse notified    Objective:     Communicated with: nurse (Alexandrea) prior to session.  Patient found supine with telemetry, pulse ox (continuous), fung catheter (B LE's wrapped with ace bandage) upon OT entry to room.    General Precautions: Standard, fall, contact    Orthopedic Precautions:LLE non weight bearing, RLE weight bearing as tolerated  Braces:  (Darco shoe R foot)  Respiratory Status: Room air     Occupational Performance:     Bed Mobility:    Patient completed Rolling/Turning to Left with  total assistance, 2 persons, and with side rail  Patient completed Rolling/Turning to Right with 2 persons and with side rail  Patient completed Scooting/Bridging with total assistance and 2 persons  Patient completed Supine to Sit with Max A - Total A and 2 persons  Patient completed Sit to Supine with maximal assistance and 2 persons     Functional Mobility/Transfers:  Not assessed this date      Activities of Daily Living:  Grooming: setup assistance as pt performed wiping face with wash cloth supine in bed   Total A to doff/hiren pressure relief boot R LE  Pt declined to perform UB dressing  Total A for natalie-rectal hygiene  Bleeding around genitalia area and nurse notified      [facility] 6 Click ADL: 12    Treatment & Education:  Pt performed static sitting EOB ~5 minutes with varying level of assistance 2/2 lateral R lean requiring total to CGA x 2 persons to improve sitting tolerance and static sitting balance.     Patient left supine with all lines intact, call button in reach, nurse notified, and PCT present    GOALS:   Multidisciplinary Problems       Occupational Therapy Goals          Problem: Occupational Therapy    Goal Priority Disciplines Outcome Interventions   Occupational Therapy Goal     OT, PT/OT Ongoing, Progressing    Description: Goals  to be met by: 4/6/24     Patient will increase functional independence with ADLs by performing:    UE Dressing with Contact Guard Assistance.  LE Dressing with Maximum Assistance.  Grooming while EOB with Stand-by Assistance.  Toileting from bedside commode with Maximum Assistance for hygiene and clothing management.   Sitting at edge of bed x5 minutes with Stand-by Assistance.  Rolling to Bilateral with Minimal Assistance.   Supine to sit with Minimal Assistance.  Stand pivot transfers with Minimal Assistance.  Toilet transfer to bedside commode with Maximum Assistance.  BUE strengthening exercise program 2x day with theraband/HEP worksheet                         Time Tracking:     OT Date of Treatment: 03/22/24  OT Start Time: 1140  OT Stop Time: 1204  OT Total Time (min): 24 min    Billable Minutes:Self Care/Home Management 10  Therapeutic Activity 14    OT/KATHLEEN: KATHLEEN     Number of KATHLEEN visits since last OT visit: 3    3/22/2024

## 2024-03-22 NOTE — ASSESSMENT & PLAN NOTE
Patient with Persistent (7 days or more) atrial fibrillation which is uncontrolled currently with    . Patient is currently in sinus rhythm.[id number] Score: 4. . Anticoagulation indicated. Anticoagulation done with lovenox as we could not get IV access for heparin  .    -Eliquis transitioned to Heparin in preparation for AKA.

## 2024-03-22 NOTE — ASSESSMENT & PLAN NOTE
Patient's FSGs are uncontrolled due to hyperglycemia on current medication regimen.  Last A1c reviewed-   Lab Results   Component Value Date    HGBA1C >14.0 (H) 03/05/2024     Most recent fingerstick glucose reviewed-   Recent Labs   Lab 03/21/24  1622 03/21/24  2044 03/22/24  0829   POCTGLUCOSE 85 143* 286*       Current correctional scale  Low  Increase anti-hyperglycemic dose as follows-   Antihyperglycemics (From admission, onward)    Start     Stop Route Frequency Ordered    03/22/24 0900  insulin detemir U-100 (Levemir) pen 14 Units         -- SubQ 2 times daily 03/22/24 0616    03/20/24 1645  insulin aspart U-100 pen 2-4 Units         -- SubQ with dinner 03/20/24 0644    03/20/24 1130  insulin aspart U-100 pen 3-5 Units         -- SubQ with lunch 03/20/24 0644    03/20/24 0715  insulin aspart U-100 pen 3-5 Units         -- SubQ with breakfast 03/20/24 0644    03/11/24 1521  insulin aspart U-100 pen 0-5 Units         -- SubQ Before meals & nightly PRN 03/11/24 1520        Hold Oral hypoglycemics while patient is in the hospital.

## 2024-03-22 NOTE — PLAN OF CARE
Problem: Adult Inpatient Plan of Care  Goal: Plan of Care Review  Outcome: Ongoing, Progressing  Goal: Patient-Specific Goal (Individualized)  Outcome: Ongoing, Progressing  Goal: Absence of Hospital-Acquired Illness or Injury  Outcome: Ongoing, Progressing  Goal: Optimal Comfort and Wellbeing  Outcome: Ongoing, Progressing  Goal: Readiness for Transition of Care  Outcome: Ongoing, Progressing     Problem: Diabetes Comorbidity  Goal: Blood Glucose Level Within Targeted Range  Outcome: Ongoing, Progressing     Problem: Adjustment to Illness (Sepsis/Septic Shock)  Goal: Optimal Coping  Outcome: Ongoing, Progressing     Problem: Bleeding (Sepsis/Septic Shock)  Goal: Absence of Bleeding  Outcome: Ongoing, Progressing     Problem: Glycemic Control Impaired (Sepsis/Septic Shock)  Goal: Blood Glucose Level Within Desired Range  Outcome: Ongoing, Progressing     Problem: Infection Progression (Sepsis/Septic Shock)  Goal: Absence of Infection Signs and Symptoms  Outcome: Ongoing, Progressing     Problem: Nutrition Impaired (Sepsis/Septic Shock)  Goal: Optimal Nutrition Intake  Outcome: Ongoing, Progressing     Problem: Infection  Goal: Absence of Infection Signs and Symptoms  Outcome: Ongoing, Progressing     Problem: Impaired Wound Healing  Goal: Optimal Wound Healing  Outcome: Ongoing, Progressing     Problem: Skin Injury Risk Increased  Goal: Skin Health and Integrity  Outcome: Ongoing, Progressing     Problem: Fall Injury Risk  Goal: Absence of Fall and Fall-Related Injury  Outcome: Ongoing, Progressing     Problem: Fluid and Electrolyte Imbalance (Acute Kidney Injury/Impairment)  Goal: Fluid and Electrolyte Balance  Outcome: Ongoing, Progressing     Problem: Oral Intake Inadequate (Acute Kidney Injury/Impairment)  Goal: Optimal Nutrition Intake  Outcome: Ongoing, Progressing     Problem: Renal Function Impairment (Acute Kidney Injury/Impairment)  Goal: Effective Renal Function  Outcome: Ongoing, Progressing      Patient remains free from falls and injury. NADN. VSS. Safety maintained; bed low and locked, call light in reach.  No complaint of pain, n/v, diarrhea, or SOB. Questions encouraged and answered. Plan of care reviewed with patient. Will continue to monitor, will continue with plan of care.   Discharged

## 2024-03-22 NOTE — SUBJECTIVE & OBJECTIVE
Interval History: REMINGTON DOMINGUEZ. Patient has intermittent delirium but can be redirected.      Objective:     Vital Signs (Most Recent):  Temp: 99.2 °F (37.3 °C) (03/22/24 1530)  Pulse: 90 (03/22/24 1530)  Resp: 19 (03/22/24 1530)  BP: (!) 130/59 (03/22/24 1530)  SpO2: (!) 90 % (03/22/24 1530) Vital Signs (24h Range):  Temp:  [97.8 °F (36.6 °C)-99.4 °F (37.4 °C)] 99.2 °F (37.3 °C)  Pulse:  [] 90  Resp:  [16-20] 19  SpO2:  [90 %-93 %] 90 %  BP: (126-153)/(59-73) 130/59     Weight: 120.2 kg (264 lb 15.9 oz)  Body mass index is 39.13 kg/m².    Intake/Output Summary (Last 24 hours) at 3/22/2024 1601  Last data filed at 3/22/2024 0617  Gross per 24 hour   Intake 240 ml   Output 775 ml   Net -535 ml         Physical Exam      Constitutional:       General: He is not in acute distress.     Appearance: Normal appearance. He is well-developed. He is ill-appearing . He is not diaphoretic but lethargic and slumped over.   HENT:      Head: Normocephalic and atraumatic.      Right Ear: External ear normal.      Left Ear: External ear normal.      Nose: Nose normal.   Eyes:      General: No scleral icterus.        Right eye: No discharge.         Left eye: No discharge.      Conjunctiva/sclera: Conjunctivae normal.   Pulmonary:      Effort: Pulmonary effort is normal. No respiratory distress.      Breath sounds: No stridor.   Skin:     General: Skin is dry.      Comments: Bruising, lesion and rash present.      LE swelling B/L with thick discolored skin. Wound on plantar L footWounds of legs, back, buttocks   Neurological:      General: No focal deficit present.      Mental Status: He is alert and oriented to person, place, and time. Mental status is at baseline.   Psychiatric:         Mood and Affect: Mood normal.         Behavior: Behavior normal.         Thought Content: Thought content normal.         Judgment: Judgment normal.   Significant Labs: All pertinent labs within the past 24 hours have been  reviewed.    Significant Imaging: I have reviewed all pertinent imaging results/findings within the past 24 hours.

## 2024-03-22 NOTE — PROGRESS NOTES
[facility] - Telemetry Stepdown  Endocrinology  Progress Note    Admit Date: 3/5/2024     Reason for Consult: Management of T2DM, Hyperglycemia     Diabetes diagnosis year: > 25 years    Home Diabetes Medications:  Novolog 6 units w/ meals and Lantus 45 units nightly (states he is not taking).     How often checking glucose at home?  Not really checking    BG readings on regimen: 200's  Hypoglycemia on the regimen?  Yes (When he was taking the Lantus)  Missed doses on regimen?  Yes    Diabetes Complications include:     Hyperglycemia, Hypoglycemia , Diabetic chronic kidney disease     , Diabetic dermatitis, Foot ulcer  , and Periodontal disease    Complicating diabetes co morbidities:   HTN; HLD      HPI: Saji Castañeda is a 75 yo M with a history of Afib, T2D, HLD, HTN, chronic osteomyelitis of L heel, s/p L TMA, PID, ESBL Klebsiella and Acinetobacter bacteremia who was admitted after being found down at home and DKA. Endocrine consulted to manage hyperglycemia and type 2 diabetes.   Hemoglobin A1C   Date Value Ref Range Status   03/05/2024 >14.0 (H) 4.0 - 5.6 % Final     Comment:     ADA Screening Guidelines:  5.7-6.4%  Consistent with prediabetes  >or=6.5%  Consistent with diabetes    High levels of fetal hemoglobin interfere with the HbA1C  assay. Heterozygous hemoglobin variants (HbS, HgC, etc)do  not significantly interfere with this assay.   However, presence of multiple variants may affect accuracy.     09/13/2023 11.0 (H) 4.5 - 5.7 % Final   03/10/2023 8.4 (H) 4.0 - 5.6 % Final     Comment:     ADA Screening Guidelines:  5.7-6.4%  Consistent with prediabetes  >or=6.5%  Consistent with diabetes    High levels of fetal hemoglobin interfere with the HbA1C  assay. Heterozygous hemoglobin variants (HbS, HgC, etc)do  not significantly interfere with this assay.   However, presence of multiple variants may affect accuracy.     09/17/2022 9.9 (H) 4.0 - 5.6 % Final     Comment:     ADA Screening Guidelines:  5.7-6.4%   "Consistent with prediabetes  >or=6.5%  Consistent with diabetes    High levels of fetal hemoglobin interfere with the HbA1C  assay. Heterozygous hemoglobin variants (HbS, HgC, etc)do  not significantly interfere with this assay.   However, presence of multiple variants may affect accuracy.              Interval HPI:   Overnight events: No acute events overnight. Patient in room 8078/8078 A. Blood glucose stable. BG at and above goal on current insulin regimen (SSI, prandial, and basal insulin ). Steroid use- None.    Renal function- Abnormal - Creatinine 1.6  Vasopressors-  None       Endocrine will continue to follow and manage insulin orders inpatient.   Of note, primary team reached out desiring tighter glycemic control. Adjusted basal insulin slightly, as patient's pre dinner BG 85 mg/dL    Diet diabetic 2000 Calorie  Diet NPO Except for: Medication     Eatin%  Nausea: No  Hypoglycemia and intervention: No  Fever: No  TPN and/or TF: No      /65   Pulse 92   Temp 98.9 °F (37.2 °C)   Resp 20   Ht 5' 9" (1.753 m)   Wt 120.2 kg (264 lb 15.9 oz)   SpO2 (!) 93%   BMI 39.13 kg/m²     Labs Reviewed and Include    No results for input(s): "GLU", "CALCIUM", "ALBUMIN", "PROT", "NA", "K", "CO2", "CL", "BUN", "CREATININE", "ALKPHOS", "ALT", "AST", "BILITOT" in the last 24 hours.    Lab Results   Component Value Date    WBC 9.20 2024    HGB 7.6 (L) 2024    HCT 24.3 (L) 2024    MCV 78 (L) 2024     2024     No results for input(s): "TSH", "FREET4" in the last 168 hours.  Lab Results   Component Value Date    HGBA1C >14.0 (H) 2024       Nutritional status:   Body mass index is 39.13 kg/m².  Lab Results   Component Value Date    ALBUMIN 1.7 (L) 2024    ALBUMIN 1.8 (L) 2024    ALBUMIN 1.9 (L) 2024     Lab Results   Component Value Date    PREALBUMIN 17 (L) 2014    PREALBUMIN 16 (L) 2014    PREALBUMIN 19 (L) 2014       Estimated " Creatinine Clearance: 51.1 mL/min (A) (based on SCr of 1.6 mg/dL (H)).    Accu-Checks  Recent Labs     03/20/24  0810 03/20/24  1225 03/20/24  1629 03/20/24  1957 03/20/24 2027 03/21/24  0413 03/21/24  0801 03/21/24  1215 03/21/24  1622 03/21/24  2044   POCTGLUCOSE 195* 184* 237* 189* 185* 227* 253* 249* 85 143*       Current Medications and/or Treatments Impacting Glycemic Control  Immunotherapy:    Immunosuppressants       None          Steroids:   Hormones (From admission, onward)      Start     Stop Route Frequency Ordered    03/05/24 1536  melatonin tablet 6 mg         -- Oral Nightly PRN 03/05/24 1437          Pressors:    Autonomic Drugs (From admission, onward)      None          Hyperglycemia/Diabetes Medications:   Antihyperglycemics (From admission, onward)      Start     Stop Route Frequency Ordered    03/22/24 0900  insulin detemir U-100 (Levemir) pen 14 Units         -- SubQ 2 times daily 03/22/24 0616    03/20/24 1645  insulin aspart U-100 pen 2-4 Units         -- SubQ with dinner 03/20/24 0644    03/20/24 1130  insulin aspart U-100 pen 3-5 Units         -- SubQ with lunch 03/20/24 0644    03/20/24 0715  insulin aspart U-100 pen 3-5 Units         -- SubQ with breakfast 03/20/24 0644    03/11/24 1521  insulin aspart U-100 pen 0-5 Units         -- SubQ Before meals & nightly PRN 03/11/24 1520            ASSESSMENT and PLAN    ID  * Osteomyelitis of left lower extremity  Optimize BG control to improve wound healing        Endocrine  Diabetic ketoacidosis without coma associated with type 2 diabetes mellitus  Resolved.         Insulin dependent type 2 diabetes mellitus  Endocrinology consulted for BG management.   BG goal 140-180    WBD 0.3 units/kg/day (Due to recurrent hypoglycemia taking a conservative approach to insulin dosing).   - Levemir (Insulin Detemir) 14 units BID (20% increase due to fasting blood glucose above goal)   - Novolog (Insulin Aspart) 3-5 units with breakfast and lunch and 2-4  units with dinner and prn for BG excursions LDC SSI (150/50) (range added due to fluctuating appetite)  - BG checks AC/HS  - Hypoglycemia protocol in place  - If blood glucose greater than 300, please ask patient not to eat food or drink anything other than water until correctional insulin has brought it back below 250    ** Please notify Endocrine for any change and/or advance in diet**  ** Please call Endocrine for any BG related issues **    Discharge Planning:   TBD. Please notify endocrinology prior to discharge.             Chaka Dominguez, DNP, FNP  Endocrinology  [facility] - Tele[facility] Stepdown

## 2024-03-22 NOTE — ASSESSMENT & PLAN NOTE
Patient's FSGs are uncontrolled due to hyperglycemia on current medication regimen.  Last A1c reviewed-   Lab Results   Component Value Date    HGBA1C >14.0 (H) 03/05/2024     Most recent fingerstick glucose reviewed-   Recent Labs   Lab 03/21/24  1622 03/21/24  2044 03/22/24  0829   POCTGLUCOSE 85 143* 286*       Current correctional scale   LD SSI  Maintain anti-hyperglycemic dose as follows-   Antihyperglycemics (From admission, onward)      Start     Stop Route Frequency Ordered    03/22/24 0900  insulin detemir U-100 (Levemir) pen 14 Units         -- SubQ 2 times daily 03/22/24 0616    03/20/24 1645  insulin aspart U-100 pen 2-4 Units         -- SubQ with dinner 03/20/24 0644    03/20/24 1130  insulin aspart U-100 pen 3-5 Units         -- SubQ with lunch 03/20/24 0644    03/20/24 0715  insulin aspart U-100 pen 3-5 Units         -- SubQ with breakfast 03/20/24 0644    03/11/24 1521  insulin aspart U-100 pen 0-5 Units         -- SubQ Before meals & nightly PRN 03/11/24 1520          Hold Oral hypoglycemics while patient is in the hospital.  Will keep patient on DKA pathway with insulin drip and fluid with protocol in place for switching to subcutaneous insulin as anion gap closes.      DKA  HHS  - Resolved  - Continue insulin regimen and BG checks TIDWM and QHS and 2am  - patient tends to become hypoglycemic around dinnertime  - could be insulin stacking from breakfast and lunch insulin aspart given poor PO intake despite assistance with feeding   - detemir 14 unit BID  - aspart scheduled w/ only breakfast and lunch and dinner with different units  -Inpatient consult to Endocrine

## 2024-03-22 NOTE — PROGRESS NOTES
[facility] - Telemetry [facility] Medicine  Progress Note    Patient Name: Saji Castañeda  MRN: [id number]  Patient Class: IP- Inpatient   Admission Date: 3/5/2024  Length of Stay: 17 days  Attending Physician: Mahendra Collins III*  Primary Care Provider: [facility] -        Subjective:     Principal Problem:Osteomyelitis of left lower extremity        HPI:  Saji Castañeda is a 74 y.o. male with a medical history of DM2, HLD, HTN, chronic osteomyelitis of L heel, L TMA, PAD, hx of ESBL klebsiella, acinetobacter bacteremia, presenting with hyperglycemia. He presented to the ED via EMS and his POCT glucose on arrival was >500. Serum blood glucose 667 with anion gap of 17 and elevated ketones. Serum potassium 3.4, corrected sodium 149. Urinalysis significant for RBCs, glucosuria, and moderate bacteria and yeast. CBC revealed leukocytosis, elevated platelets, and mild anemia. CMP shows Na 135, K 3.4, BUN 29, Cr 2.1, Glucose 677, Lactate 4.61. BNP and troponin elevated. GFR 32.2. Insulin drip, IV fluids, zosyn, vancomycin, and potassium given.      He is unable to provide much history, but states that he has not been taking his home medications for at least a week. He reports shortness of breath, fatigue, and weakness for the last 6-7 days. He has chills and reports episodes of emesis. He denies abdominal pain, chest pain, palpitations, recent illness/infection, fevers, changes to bowel movements and urinary output. Patient lives with his brother and sister at home. He was last seen in the ED on 2/21 after a fall. He was hypoglycemic BGL 60 at that time which returned to normal after eating. He was also scheduled for a wound clinic appointment today 3/5 at [facility] with Dr. Wilson.        Overview/Hospital Course:  Wound care and cultures collected by Podiatry with recs for Vascular consult for amputation. Patient declined amputation. ID recommending IV Ertapenem for chronic osteomyelitis for 6 week  duration (EED: 4/15/24). With high wound care needs and IV antibiotics plan patient needing long-term placement. He is being treated with topical permethrin for bedbugs. Wound care has been dressing the wounds. 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 Still adjusting insulin to control sugars better. Giving fluids for YNES. Poor PO intake even with feeding assistance. LTAC authorization denied by [employer]. Waiting for SNF placement ([facility], awaiting auth and family to complete paperwork). Patient started spiking fever again and infectious work up done. ID re-consulted. Patient has been counseled multiple times that source control cannot be achieved without amputation. Patient agreeable to AKA scheduled for 3/27. Transitioned to heparin from eliquis. Foely's placed for urinary rentention.       Interval History: ALBERTO, VSS. Patient has intermittent delirium but can be redirected.      Objective:     Vital Signs (Most Recent):  Temp: 99.2 °F (37.3 °C) (03/22/24 1530)  Pulse: 90 (03/22/24 1530)  Resp: 19 (03/22/24 1530)  BP: (!) 130/59 (03/22/24 1530)  SpO2: (!) 90 % (03/22/24 1530) Vital Signs (24h Range):  Temp:  [97.8 °F (36.6 °C)-99.4 °F (37.4 °C)] 99.2 °F (37.3 °C)  Pulse:  [] 90  Resp:  [16-20] 19  SpO2:  [90 %-93 %] 90 %  BP: (126-153)/(59-73) 130/59     Weight: 120.2 kg (264 lb 15.9 oz)  Body mass index is 39.13 kg/m².    Intake/Output Summary (Last 24 hours) at 3/22/2024 1601  Last data filed at 3/22/2024 0617  Gross per 24 hour   Intake 240 ml   Output 775 ml   Net -535 ml         Physical Exam      Constitutional:       General: He is not in acute distress.     Appearance: Normal appearance. He is well-developed. He is ill-appearing . He is not diaphoretic but lethargic and slumped over.   HENT:      Head: Normocephalic and atraumatic.      Right Ear: External ear normal.      Left Ear: External ear normal.      Nose: Nose normal.   Eyes:       General: No scleral icterus.        Right eye: No discharge.         Left eye: No discharge.      Conjunctiva/sclera: Conjunctivae normal.   Pulmonary:      Effort: Pulmonary effort is normal. No respiratory distress.      Breath sounds: No stridor.   Skin:     General: Skin is dry.      Comments: Bruising, lesion and rash present.      LE swelling B/L with thick discolored skin. Wound on plantar L footWounds of legs, back, buttocks   Neurological:      General: No focal deficit present.      Mental Status: He is alert and oriented to person, place, and time. Mental status is at baseline.   Psychiatric:         Mood and Affect: Mood normal.         Behavior: Behavior normal.         Thought Content: Thought content normal.         Judgment: Judgment normal.   Significant Labs: All pertinent labs within the past 24 hours have been reviewed.    Significant Imaging: I have reviewed all pertinent imaging results/findings within the past 24 hours.    Assessment/Plan:      * Osteomyelitis of left lower extremity  Patient with Proteus and enterobacter on leg cultures collected by Podiatry with concern for acute on chronic osteo. Fungal culture grew fusarium. He has started spiking fever and still is not agreeable to amputation, will do infectious work up to look out for source.    -UA noninfectious   -Blood culture NGTD  - Inpatient consult to ID and vascular surgery who plans to do AKA on 3/27.  - IV ertapenem with end date 4/15/24   - Added voriconazole for fusarium coverage which grew on fungal culture - 3 months  - midline catheters placed on admission for difficult IV access      Urinary retention  Patient developed new urinary retention.     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 BMP reviewed- noted Estimated Creatinine Clearance: 51.1 mL/min (A) (based on SCr of 1.6 mg/dL (H)). according to latest data. Based on current GFR, CKD stage is stage 3 - GFR 30-59.  Monitor UOP and serial BMP and adjust therapy as needed. Renally dose meds. Avoid nephrotoxic medications and procedures.    Severe sepsis  This patient does have evidence of infective focus  My overall impression is sepsis.  Source: Skin and Soft Tissue (location left leg)  Antibiotics given-   Antibiotics (72h ago, onward)      Start     Stop Route Frequency Ordered    03/08/24 1230  ertapenem (INVANZ) 1 g in sodium chloride 0.9 % 100 mL IVPB (MB+)         -- IV Every 24 hours (non-standard times) 03/08/24 1115          Latest lactate reviewed-  No results for input(s): "LACTATE", "POCLAC" in the last 72 hours.    Organ dysfunction indicated by Acute kidney injury    Fluid challenge Actual Body weight- Patient will receive 30ml/kg actual body weight to calculate fluid bolus for treatment of septic shock.     Post- resuscitation assessment No - Post resuscitation assessment not needed       Will Not start Pressors-     - See osteo      Other hyperlipidemia  Continue home atorvastatin      Diabetic ketoacidosis without coma associated with type 2 diabetes mellitus  Patient's FSGs are uncontrolled due to hyperglycemia on current medication regimen.  Last A1c reviewed-   Lab Results   Component Value Date    HGBA1C >14.0 (H) 03/05/2024     Most recent fingerstick glucose reviewed-   Recent Labs   Lab 03/21/24  1622 03/21/24  2044 03/22/24  0829   POCTGLUCOSE 85 143* 286*       Current correctional scale   LD " SSI  Maintain anti-hyperglycemic dose as follows-   Antihyperglycemics (From admission, onward)      Start     Stop Route Frequency Ordered    03/22/24 0900  insulin detemir U-100 (Levemir) pen 14 Units         -- SubQ 2 times daily 03/22/24 0616    03/20/24 1645  insulin aspart U-100 pen 2-4 Units         -- SubQ with dinner 03/20/24 0644    03/20/24 1130  insulin aspart U-100 pen 3-5 Units         -- SubQ with lunch 03/20/24 0644    03/20/24 0715  insulin aspart U-100 pen 3-5 Units         -- SubQ with breakfast 03/20/24 0644    03/11/24 1521  insulin aspart U-100 pen 0-5 Units         -- SubQ Before meals & nightly PRN 03/11/24 1520          Hold Oral hypoglycemics while patient is in the hospital.  Will keep patient on DKA pathway with insulin drip and fluid with protocol in place for switching to subcutaneous insulin as anion gap closes.      DKA  HHS  - Resolved  - Continue insulin regimen and BG checks TIDWM and QHS and 2am  - patient tends to become hypoglycemic around dinnertime  - could be insulin stacking from breakfast and lunch insulin aspart given poor PO intake despite assistance with feeding   - detemir 14 unit BID  - aspart scheduled w/ only breakfast and lunch and dinner with different units  -Inpatient consult to Endocrine    Paroxysmal atrial fibrillation  Patient with Persistent (7 days or more) atrial fibrillation which is uncontrolled currently with    . Patient is currently in sinus rhythm.[id number] Score: 4. . Anticoagulation indicated. Anticoagulation done with lovenox as we could not get IV access for heparin  .    -Eliquis transitioned to Heparin in preparation for AKA.    Chronic deep vein thrombosis (DVT) of right upper extremity  - eliquis transitioned to Heparin      Cellulitis of left lower leg  See osteo      Peripheral arterial disease  Resume plavix      YNES (acute kidney injury)  Patient with acute kidney injury/acute renal failure likely due to pre-renal azotemia due to  dehydration YNES is currently worsening. Baseline creatinine  1.1  - Labs reviewed- Renal function/electrolytes with Estimated Creatinine Clearance: 51.1 mL/min (A) (based on SCr of 1.6 mg/dL (H)). according to latest data. Monitor urine output and serial BMP and adjust therapy as needed. Avoid nephrotoxins and renally dose meds for GFR listed above.    Creatinine 2.1 on admit, baseline around 1.1  - Likely pre-renal from poor PO intake    Lab Results   Component Value Date    CREATININE 1.6 (H) 03/22/2024       Plan:   - giving 1 liter of LR  - Check urine lytes urine protein creatinine ratio if YNES does not improve w/ fluids  - Strict I&Os and daily weights   - Daily BMP  - Trend sCr  - Avoid nephrotoxic agents such as NSAIDs, ACEi, ARBs and IV radiocontrast.  - Renally dose meds to current GFR.  - Maintain MAP > 65.  - No indications for HD at this time.   - Maintain electrolytes at goal: Mg > 2, Phos > 3, K > 4.           Insulin dependent type 2 diabetes mellitus  Patient's FSGs are uncontrolled due to hyperglycemia on current medication regimen.  Last A1c reviewed-   Lab Results   Component Value Date    HGBA1C >14.0 (H) 03/05/2024     Most recent fingerstick glucose reviewed-   Recent Labs   Lab 03/21/24  1622 03/21/24  2044 03/22/24  0829   POCTGLUCOSE 85 143* 286*       Current correctional scale  Low  Increase anti-hyperglycemic dose as follows-   Antihyperglycemics (From admission, onward)      Start     Stop Route Frequency Ordered    03/22/24 0900  insulin detemir U-100 (Levemir) pen 14 Units         -- SubQ 2 times daily 03/22/24 0616    03/20/24 1645  insulin aspart U-100 pen 2-4 Units         -- SubQ with dinner 03/20/24 0644    03/20/24 1130  insulin aspart U-100 pen 3-5 Units         -- SubQ with lunch 03/20/24 0644    03/20/24 0715  insulin aspart U-100 pen 3-5 Units         -- SubQ with breakfast 03/20/24 0644    03/11/24 1521  insulin aspart U-100 pen 0-5 Units         -- SubQ Before meals &  nightly PRN 03/11/24 1520          Hold Oral hypoglycemics while patient is in the hospital.    Essential hypertension  Chronic, uncontrolled. Latest blood pressure and vitals reviewed-     Temp:  [97.8 °F (36.6 °C)-99.4 °F (37.4 °C)]   Pulse:  []   Resp:  [16-20]   BP: (126-153)/(59-73)   SpO2:  [90 %-93 %] .   Home meds for hypertension were reviewed and noted below.   Hypertension Medications               furosemide (LASIX) 40 MG tablet Take 20 mg by mouth.    hydrALAZINE (APRESOLINE) 100 MG tablet Take 1 tablet (100 mg total) by mouth every 8 (eight) hours.    isosorbide dinitrate (ISORDIL) 10 MG tablet Take 1 tablet (10 mg total) by mouth 3 (three) times daily.    NIFEdipine (PROCARDIA-XL) 60 MG (OSM) 24 hr tablet Take 1 tablet (60 mg total) by mouth once daily.            While in the hospital, will manage blood pressure as follows; Adjust home antihypertensive regimen as follows- will keep nifedipine - increased to 90     Will utilize p.r.n. blood pressure medication only if patient's blood pressure greater than 180/110 and he develops symptoms such as worsening chest pain or shortness of breath.      VTE Risk Mitigation (From admission, onward)           Ordered     heparin 25,000 units in dextrose 5% (100 units/ml) IV bolus from bag HIGH INTENSITY nomogram - [facility]  As needed (PRN)        Question:  Heparin Infusion Adjustment (DO NOT MODIFY ANSWER)  Answer:  [email or web address]\Images\Pharmacy\HeparinInfusions\heparin HIGH INTENSITY nomogram for [facility] [id number]    03/20/24 0744     heparin 25,000 units in dextrose 5% (100 units/ml) IV bolus from bag HIGH INTENSITY nomogram - [facility]  As needed (PRN)        Question:  Heparin Infusion Adjustment (DO NOT MODIFY ANSWER)  Answer:  \\[email or web address]\Images\Pharmacy\HeparinInfusions\heparin HIGH INTENSITY nomogram for [facility] [id number]    03/20/24 0720     heparin 25,000 units in dextrose 5% 250 mL (100 units/mL) infusion HIGH INTENSITY nomogram - [facility]  Continuous         Question:  Begin at (units/kg/hr)  Answer:  18 03/20/24 0747                    Discharge Planning   OXANA: 3/29/2024     Code Status: Full Code   Is the patient medically ready for discharge?: No    Reason for patient still in hospital (select all that apply): Treatment  Discharge Plan A: Skilled Nursing Facility   Discharge Delays: None known at this time              Lynette Perkins MD  Department of Hospital Medicine   [facility] - Telemetry Stepdown

## 2024-03-22 NOTE — PT/OT/SLP PROGRESS
"Physical Therapy Treatment    Patient Name:  Saji Castañeda   MRN:  [id number]    Recommendations:     Discharge Recommendations: Moderate Intensity Therapy  Discharge Equipment Recommendations: hospital bed, lift device, (Pressure relief mattress)   Barriers to discharge: Decreased caregiver support and Increased skilled assistance    Assessment:     Saji Castañeda is a 74 y.o. male admitted with a medical diagnosis of Osteomyelitis of left lower extremity.  He presents with the following impairments/functional limitations: weakness, impaired endurance, impaired functional mobility, impaired balance, impaired cognition, decreased lower extremity function, decreased upper extremity function, decreased safety awareness, pain, decreased ROM, impaired coordination, impaired skin, edema, orthopedic precautions. Patient initially demonstrating good motivation to participate in PT session, however progressive encouragement required. Despite encouragement, patient deferring bed mobility & in-bed therapeutic exercise, however agreeable to repositioning. Of note, patient AOx3; disoriented to place on 2 occasions during session. Patient continues to demonstrate the need for moderate intensity therapy on a daily basis exhibited by decreased independence with self-care and functional mobility.     Rehab Prognosis: Poor; patient would benefit from acute skilled PT services to address these deficits and reach maximum level of function.    Recent Surgery: Procedure(s) (LRB):  AMPUTATION, ABOVE KNEE (Left)      Plan:     During this hospitalization, patient to be seen 2 x/week to address the identified rehab impairments via therapeutic activities, therapeutic exercises, neuromuscular re-education and progress toward the following goals:    Plan of Care Expires:  04/06/24    Subjective     Chief Complaint: Patient stating "[He'll] crush it [in PT] tomorrow"   Patient/Family Comments/goals: "I just got here. I left [facility], on [address] " [address]" PT provided reorientation to place.  Pain/Comfort:  Pain Rating 1: Pain present, but not rated  Location - Orientation 1: generalized  Location 1: groin  Pain Addressed 1: Reposition, Distraction, Cessation of Activity  Pain Rating Post-Intervention 1: Intensity not verbalized      Objective:     Communicated with RN prior to session.  Patient found HOB elevated, with significant trunk lean to R with telemetry, pulse ox (continuous), fung catheter upon PT entry to room.     General Precautions: Standard, fall, contact, diabetic   Orthopedic Precautions:LLE non weight bearing, RLE weight bearing as tolerated   Braces:  (Darco Shoe (RLE))   Body mass index is 39.13 kg/m².  Oxygen Device: Room Air  Vitals: BP (!) 130/59 (BP Location: Right arm, Patient Position: Lying)   Pulse 90   Temp 99.2 °F (37.3 °C) (Oral)   Resp 19   Ht 5' 9" (1.753 m)   Wt 120.2 kg (264 lb 15.9 oz)   SpO2 (!) 92%   BMI 39.13 kg/m²     Exams:  Cognition:   Alert and Cooperative   Patient is oriented to Person, Time, Situation ; Not oriented to time  At beginning of session, patient stating he left [facility]. PT providing reorientation to place - "Hospital" & "[facility]"  ~2 mins later, patient disoriented to Place, even with cuing provided. Patient stating he is at a grocery store.  Command following: Follows one-step verbal commands 75% of time     Functional Mobility:  Bed Mobility:     Rolling Left: Pt deferred  Rolling Right: Pt deferred  Supine (HOB elevated) Scooting:   Lateral (L): x3, MaxA. Complex motor task completed in single steps, with cuing & facilitation required. Pt utilizing LUE assist via bedrail  Supine>Sit: Pt deferred  Sit>Supine: Pt deferred    Transfers: Not assessed    Balance: Not assessed    AM-PAC 6 CLICK MOBILITY  Turning over in bed (including adjusting bedclothes, sheets and blankets)?: 2  Sitting down on and standing up from a chair with arms (e.g., wheelchair, bedside commode, etc.): " 1  Moving from lying on back to sitting on the side of the bed?: 2  Moving to and from a bed to a chair (including a wheelchair)?: 1  Need to walk in hospital room?: 1  Climbing 3-5 steps with a railing?: 1  Basic Mobility Total Score: 8     Treatment & Education:  Patient completed the following supine activities:  SLR: x3 L/R AAROM, limited ROM observed.  Hip Abd: x3 L AAROM, limited ROM observed  Hip Add: x2 R AAROM, limited ROM observed    PT facilitating midline positioning of head, trunk, & BLEs, utilzing wedges & pillows. Additionally positioned BLEs in hip abduction to facilitate neutral positioning of B hips, utilizing rolled towel, & offloaded pt's heels via pillow.    Patient Education Provided on:  The role of physical therapy and how the patient can benefit from skilled services  The negative effects of prolonged bed rest/sedentary behavior, along with the importance of positioning + change of positioning, & patient participation with PT  The importance of contacting RN, via call light, for mobility throughout the day  Pt white board updated with current therapists name and level of mobility assistance needed.     Patient Verbalized understanding of all topics touched on this date. All patient questions answered within the PT scope of practice    Patient left HOB elevated with all lines intact, call button in reach, and RN notified..    GOALS:   Multidisciplinary Problems       Physical Therapy Goals          Problem: Physical Therapy    Goal Priority Disciplines Outcome Goal Variances Interventions   Physical Therapy Goal     PT, PT/OT Unable to Meet, Plan Revised     Description: Goals to be met by: 24, extended to 24    Patient will increase functional independence with mobility by performin. Supine to sit with Maximum Assistance  2. Sit to supine with Maximum Assistance  3. Rolling to Left and Right with Moderate Assistance  4. Sit to stand transfer with Maximum Assistance with  LRAD  5. Bed to chair transfer with Maximum Assistance using LRAD  6. Sitting at edge of bed 10 minutes with Contact Guard Assistance  7. Lower extremity exercise program x15 reps per handout, with assistance as needed                         Time Tracking:     PT Received On: 03/22/24  PT Start Time: 1009     PT Stop Time: 1030  PT Total Time (min): 21 min     Billable Minutes: Therapeutic Activity 16 + 5mins (Non-Billable)      Treatment Type: Treatment  PT/PTA: PT     Number of PTA visits since last PT visit: 0     03/22/2024

## 2024-03-22 NOTE — PLAN OF CARE
Problem: Physical Therapy  Goal: Physical Therapy Goal  Description: Goals to be met by: 24, extended to 24    Patient will increase functional independence with mobility by performin. Supine to sit with Maximum Assistance  2. Sit to supine with Maximum Assistance  3. Rolling to Left and Right with Moderate Assistance  4. Sit to stand transfer with Maximum Assistance with LRAD  5. Bed to chair transfer with Maximum Assistance using LRAD  6. Sitting at edge of bed 10 minutes with Contact Guard Assistance  7. Lower extremity exercise program x15 reps per handout, with assistance as needed    Outcome: Unable to Meet, Plan Revised    Goals modified, with extension through .

## 2024-03-22 NOTE — ASSESSMENT & PLAN NOTE
Endocrinology consulted for BG management.   BG goal 140-180    WBD 0.3 units/kg/day (Due to recurrent hypoglycemia taking a conservative approach to insulin dosing).   - Levemir (Insulin Detemir) 14 units BID (20% increase due to fasting blood glucose above goal)   - Novolog (Insulin Aspart) 3-5 units with breakfast and lunch and 2-4 units with dinner and prn for BG excursions LDC SSI (150/50) (range added due to fluctuating appetite)  - BG checks AC/HS  - Hypoglycemia protocol in place  - If blood glucose greater than 300, please ask patient not to eat food or drink anything other than water until correctional insulin has brought it back below 250    ** Please notify Endocrine for any change and/or advance in diet**  ** Please call Endocrine for any BG related issues **    Discharge Planning:   TBD. Please notify endocrinology prior to discharge.

## 2024-03-22 NOTE — SUBJECTIVE & OBJECTIVE
"Interval HPI:   Overnight events: No acute events overnight. Patient in room 8078/8078 A. Blood glucose stable. BG at and above goal on current insulin regimen (SSI, prandial, and basal insulin ). Steroid use- None.    Renal function- Abnormal - Creatinine 1.6  Vasopressors-  None       Endocrine will continue to follow and manage insulin orders inpatient.   Of note, primary team reached out desiring tighter glycemic control. Adjusted basal insulin slightly, as patient's pre dinner BG 85 mg/dL    Diet diabetic 2000 Calorie  Diet NPO Except for: Medication     Eatin%  Nausea: No  Hypoglycemia and intervention: No  Fever: No  TPN and/or TF: No      /65   Pulse 92   Temp 98.9 °F (37.2 °C)   Resp 20   Ht 5' 9" (1.753 m)   Wt 120.2 kg (264 lb 15.9 oz)   SpO2 (!) 93%   BMI 39.13 kg/m²     Labs Reviewed and Include    No results for input(s): "GLU", "CALCIUM", "ALBUMIN", "PROT", "NA", "K", "CO2", "CL", "BUN", "CREATININE", "ALKPHOS", "ALT", "AST", "BILITOT" in the last 24 hours.    Lab Results   Component Value Date    WBC 9.20 2024    HGB 7.6 (L) 2024    HCT 24.3 (L) 2024    MCV 78 (L) 2024     2024     No results for input(s): "TSH", "FREET4" in the last 168 hours.  Lab Results   Component Value Date    HGBA1C >14.0 (H) 2024       Nutritional status:   Body mass index is 39.13 kg/m².  Lab Results   Component Value Date    ALBUMIN 1.7 (L) 2024    ALBUMIN 1.8 (L) 2024    ALBUMIN 1.9 (L) 2024     Lab Results   Component Value Date    PREALBUMIN 17 (L) 2014    PREALBUMIN 16 (L) 2014    PREALBUMIN 19 (L) 2014       Estimated Creatinine Clearance: 51.1 mL/min (A) (based on SCr of 1.6 mg/dL (H)).    Accu-Checks  Recent Labs     24  0810 24  1225 24  1629 24  1957 24  2027 24  0413 24  0801 24  1215 24  1622 24  2044   POCTGLUCOSE 195* 184* 237* 189* 185* 227* 253* 249* 85 " 143*       Current Medications and/or Treatments Impacting Glycemic Control  Immunotherapy:    Immunosuppressants       None          Steroids:   Hormones (From admission, onward)      Start     Stop Route Frequency Ordered    03/05/24 1536  melatonin tablet 6 mg         -- Oral Nightly PRN 03/05/24 1437          Pressors:    Autonomic Drugs (From admission, onward)      None          Hyperglycemia/Diabetes Medications:   Antihyperglycemics (From admission, onward)      Start     Stop Route Frequency Ordered    03/22/24 0900  insulin detemir U-100 (Levemir) pen 14 Units         -- SubQ 2 times daily 03/22/24 0616    03/20/24 1645  insulin aspart U-100 pen 2-4 Units         -- SubQ with dinner 03/20/24 0644    03/20/24 1130  insulin aspart U-100 pen 3-5 Units         -- SubQ with lunch 03/20/24 0644    03/20/24 0715  insulin aspart U-100 pen 3-5 Units         -- SubQ with breakfast 03/20/24 0644    03/11/24 1521  insulin aspart U-100 pen 0-5 Units         -- SubQ Before meals & nightly PRN 03/11/24 1520

## 2024-03-23 LAB
ABO + RH BLD: NORMAL
ALBUMIN SERPL BCP-MCNC: 1.9 G/DL (ref 3.5–5.2)
ALP SERPL-CCNC: 78 U/L (ref 55–135)
ALT SERPL W/O P-5'-P-CCNC: 8 U/L (ref 10–44)
ANION GAP SERPL CALC-SCNC: 11 MMOL/L (ref 8–16)
APTT PPP: 36.6 SEC (ref 21–32)
APTT PPP: 48 SEC (ref 21–32)
APTT PPP: 88.3 SEC (ref 21–32)
APTT PPP: 92.1 SEC (ref 21–32)
AST SERPL-CCNC: 25 U/L (ref 10–40)
BASOPHILS # BLD AUTO: 0.02 K/UL (ref 0–0.2)
BASOPHILS NFR BLD: 0.2 % (ref 0–1.9)
BILIRUB SERPL-MCNC: 0.2 MG/DL (ref 0.1–1)
BLD GP AB SCN CELLS X3 SERPL QL: NORMAL
BLD PROD TYP BPU: NORMAL
BLOOD UNIT EXPIRATION DATE: NORMAL
BLOOD UNIT TYPE CODE: 6200
BLOOD UNIT TYPE: NORMAL
BUN SERPL-MCNC: 29 MG/DL (ref 8–23)
CALCIUM SERPL-MCNC: 8 MG/DL (ref 8.7–10.5)
CHLORIDE SERPL-SCNC: 104 MMOL/L (ref 95–110)
CO2 SERPL-SCNC: 24 MMOL/L (ref 23–29)
CODING SYSTEM: NORMAL
CREAT SERPL-MCNC: 1.6 MG/DL (ref 0.5–1.4)
CROSSMATCH INTERPRETATION: NORMAL
DIFFERENTIAL METHOD BLD: ABNORMAL
DISPENSE STATUS: NORMAL
EOSINOPHIL # BLD AUTO: 0.2 K/UL (ref 0–0.5)
EOSINOPHIL NFR BLD: 2.2 % (ref 0–8)
ERYTHROCYTE [DISTWIDTH] IN BLOOD BY AUTOMATED COUNT: 16.5 % (ref 11.5–14.5)
EST. GFR  (NO RACE VARIABLE): 44.7 ML/MIN/1.73 M^2
GLUCOSE SERPL-MCNC: 83 MG/DL (ref 70–110)
HCT VFR BLD AUTO: 22.3 % (ref 40–54)
HGB BLD-MCNC: 6.8 G/DL (ref 14–18)
IMM GRANULOCYTES # BLD AUTO: 0.04 K/UL (ref 0–0.04)
IMM GRANULOCYTES NFR BLD AUTO: 0.5 % (ref 0–0.5)
LYMPHOCYTES # BLD AUTO: 1.4 K/UL (ref 1–4.8)
LYMPHOCYTES NFR BLD: 17.3 % (ref 18–48)
MCH RBC QN AUTO: 24.5 PG (ref 27–31)
MCHC RBC AUTO-ENTMCNC: 30.5 G/DL (ref 32–36)
MCV RBC AUTO: 80 FL (ref 82–98)
MONOCYTES # BLD AUTO: 0.7 K/UL (ref 0.3–1)
MONOCYTES NFR BLD: 8.2 % (ref 4–15)
NEUTROPHILS # BLD AUTO: 5.8 K/UL (ref 1.8–7.7)
NEUTROPHILS NFR BLD: 71.6 % (ref 38–73)
NRBC BLD-RTO: 0 /100 WBC
PLATELET # BLD AUTO: 491 K/UL (ref 150–450)
PMV BLD AUTO: 9.3 FL (ref 9.2–12.9)
POCT GLUCOSE: 123 MG/DL (ref 70–110)
POCT GLUCOSE: 130 MG/DL (ref 70–110)
POCT GLUCOSE: 141 MG/DL (ref 70–110)
POCT GLUCOSE: 171 MG/DL (ref 70–110)
POTASSIUM SERPL-SCNC: 4.3 MMOL/L (ref 3.5–5.1)
PROT SERPL-MCNC: 6.5 G/DL (ref 6–8.4)
RBC # BLD AUTO: 2.78 M/UL (ref 4.6–6.2)
SODIUM SERPL-SCNC: 139 MMOL/L (ref 136–145)
SPECIMEN OUTDATE: NORMAL
TRANS ERYTHROCYTES VOL PATIENT: NORMAL ML
WBC # BLD AUTO: 8.14 K/UL (ref 3.9–12.7)

## 2024-03-23 PROCEDURE — 25000003 PHARM REV CODE 250: Performed by: PHYSICIAN ASSISTANT

## 2024-03-23 PROCEDURE — 25000003 PHARM REV CODE 250: Performed by: INTERNAL MEDICINE

## 2024-03-23 PROCEDURE — 20600001 HC STEP DOWN PRIVATE ROOM

## 2024-03-23 PROCEDURE — 25000003 PHARM REV CODE 250

## 2024-03-23 PROCEDURE — P9021 RED BLOOD CELLS UNIT: HCPCS

## 2024-03-23 PROCEDURE — 86920 COMPATIBILITY TEST SPIN: CPT

## 2024-03-23 PROCEDURE — 36415 COLL VENOUS BLD VENIPUNCTURE: CPT

## 2024-03-23 PROCEDURE — 80053 COMPREHEN METABOLIC PANEL: CPT

## 2024-03-23 PROCEDURE — 85730 THROMBOPLASTIN TIME PARTIAL: CPT

## 2024-03-23 PROCEDURE — 27000207 HC ISOLATION

## 2024-03-23 PROCEDURE — A4216 STERILE WATER/SALINE, 10 ML: HCPCS

## 2024-03-23 PROCEDURE — 86850 RBC ANTIBODY SCREEN: CPT

## 2024-03-23 PROCEDURE — 99233 SBSQ HOSP IP/OBS HIGH 50: CPT | Mod: ,,, | Performed by: NURSE PRACTITIONER

## 2024-03-23 PROCEDURE — 85730 THROMBOPLASTIN TIME PARTIAL: CPT | Mod: 91 | Performed by: FAMILY MEDICINE

## 2024-03-23 PROCEDURE — 85025 COMPLETE CBC W/AUTO DIFF WBC: CPT

## 2024-03-23 PROCEDURE — 36415 COLL VENOUS BLD VENIPUNCTURE: CPT | Mod: XB | Performed by: FAMILY MEDICINE

## 2024-03-23 PROCEDURE — 63600175 PHARM REV CODE 636 W HCPCS

## 2024-03-23 PROCEDURE — 36415 COLL VENOUS BLD VENIPUNCTURE: CPT | Mod: XB

## 2024-03-23 PROCEDURE — 63600175 PHARM REV CODE 636 W HCPCS: Performed by: PHYSICIAN ASSISTANT

## 2024-03-23 PROCEDURE — 25000003 PHARM REV CODE 250: Performed by: STUDENT IN AN ORGANIZED HEALTH CARE EDUCATION/TRAINING PROGRAM

## 2024-03-23 RX ORDER — HYDROCODONE BITARTRATE AND ACETAMINOPHEN 500; 5 MG/1; MG/1
TABLET ORAL
Status: DISCONTINUED | OUTPATIENT
Start: 2024-03-23 | End: 2024-03-25

## 2024-03-23 RX ORDER — INSULIN ASPART 100 [IU]/ML
4-6 INJECTION, SOLUTION INTRAVENOUS; SUBCUTANEOUS
Status: DISCONTINUED | OUTPATIENT
Start: 2024-03-24 | End: 2024-03-29

## 2024-03-23 RX ADMIN — Medication 10 ML: at 05:03

## 2024-03-23 RX ADMIN — ATORVASTATIN CALCIUM 40 MG: 40 TABLET, FILM COATED ORAL at 08:03

## 2024-03-23 RX ADMIN — Medication 10 ML: at 01:03

## 2024-03-23 RX ADMIN — HEPARIN SODIUM AND DEXTROSE 14 UNITS/KG/HR: 10000; 5 INJECTION INTRAVENOUS at 08:03

## 2024-03-23 RX ADMIN — ERTAPENEM 1 G: 1 INJECTION INTRAMUSCULAR; INTRAVENOUS at 01:03

## 2024-03-23 RX ADMIN — GABAPENTIN 300 MG: 300 CAPSULE ORAL at 09:03

## 2024-03-23 RX ADMIN — INSULIN ASPART 3 UNITS: 100 INJECTION, SOLUTION INTRAVENOUS; SUBCUTANEOUS at 01:03

## 2024-03-23 RX ADMIN — HEPARIN SODIUM AND DEXTROSE 17 UNITS/KG/HR: 10000; 5 INJECTION INTRAVENOUS at 01:03

## 2024-03-23 RX ADMIN — Medication 10 ML: at 11:03

## 2024-03-23 RX ADMIN — CLOPIDOGREL BISULFATE 75 MG: 75 TABLET ORAL at 08:03

## 2024-03-23 RX ADMIN — TAMSULOSIN HYDROCHLORIDE 0.4 MG: 0.4 CAPSULE ORAL at 08:03

## 2024-03-23 RX ADMIN — Medication 10 ML: at 12:03

## 2024-03-23 RX ADMIN — Medication 6 MG: at 09:03

## 2024-03-23 RX ADMIN — COLLAGENASE SANTYL: 250 OINTMENT TOPICAL at 08:03

## 2024-03-23 RX ADMIN — VORICONAZOLE 200 MG: 200 TABLET, FILM COATED ORAL at 08:03

## 2024-03-23 RX ADMIN — GABAPENTIN 300 MG: 300 CAPSULE ORAL at 08:03

## 2024-03-23 RX ADMIN — SENNOSIDES 8.6 MG: 8.6 TABLET, FILM COATED ORAL at 08:03

## 2024-03-23 RX ADMIN — PANTOPRAZOLE SODIUM 40 MG: 40 TABLET, DELAYED RELEASE ORAL at 08:03

## 2024-03-23 RX ADMIN — POLYETHYLENE GLYCOL 3350 17 G: 17 POWDER, FOR SOLUTION ORAL at 08:03

## 2024-03-23 RX ADMIN — NIFEDIPINE 90 MG: 30 TABLET, FILM COATED, EXTENDED RELEASE ORAL at 08:03

## 2024-03-23 RX ADMIN — VORICONAZOLE 200 MG: 200 TABLET, FILM COATED ORAL at 09:03

## 2024-03-23 NOTE — PROGRESS NOTES
[facility] - Tele[facility]  Progress Note    Patient Name: Saji Castañeda  MRN: [id number]  Patient Class: IP- Inpatient   Admission Date: 3/5/2024  Length of Stay: 18 days  Attending Physician: Mahendra Collins III*  Primary Care Provider: [facility] -        Subjective:     Principal Problem:Osteomyelitis of left lower extremity        HPI:  Saji Castañeda is a 74 y.o. male with a medical history of DM2, HLD, HTN, chronic osteomyelitis of L heel, L TMA, PAD, hx of ESBL klebsiella, acinetobacter bacteremia, presenting with hyperglycemia. He presented to the ED via EMS and his POCT glucose on arrival was >500. Serum blood glucose 667 with anion gap of 17 and elevated ketones. Serum potassium 3.4, corrected sodium 149. Urinalysis significant for RBCs, glucosuria, and moderate bacteria and yeast. CBC revealed leukocytosis, elevated platelets, and mild anemia. CMP shows Na 135, K 3.4, BUN 29, Cr 2.1, Glucose 677, Lactate 4.61. BNP and troponin elevated. GFR 32.2. Insulin drip, IV fluids, zosyn, vancomycin, and potassium given.      He is unable to provide much history, but states that he has not been taking his home medications for at least a week. He reports shortness of breath, fatigue, and weakness for the last 6-7 days. He has chills and reports episodes of emesis. He denies abdominal pain, chest pain, palpitations, recent illness/infection, fevers, changes to bowel movements and urinary output. Patient lives with his brother and sister at home. He was last seen in the ED on 2/21 after a fall. He was hypoglycemic BGL 60 at that time which returned to normal after eating. He was also scheduled for a wound clinic appointment today 3/5 at [facility] with Dr. Wilson.        Overview/Hospital Course:  Wound care and cultures collected by Podiatry with recs for Vascular consult for amputation. Patient declined amputation. ID recommending IV Ertapenem for chronic osteomyelitis for 6 week  duration (EED: 4/15/24). With high wound care needs and IV antibiotics plan patient needing long-term placement. He is being treated with topical permethrin for bedbugs. Wound care has been dressing the wounds. 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 Still adjusting insulin to control sugars better. Giving fluids for YNES. Poor PO intake even with feeding assistance. LTAC authorization denied by [employer]. Waiting for SNF placement ([facility], awaiting auth and family to complete paperwork). Patient started spiking fever again and infectious work up done. ID re-consulted. Patient has been counseled multiple times that source control cannot be achieved without amputation. Patient agreeable to AKA scheduled for 3/27. Transitioned to heparin from eliquis. Sams placed for urinary rentention.      Interval History: Transfusing 1 unit prbc for hemoglobin 6.8. no apparent source of bleed. Patient denies acute bleeding. Likely from chronic iron deficiency/repeated lab draws during basically a month long hospitalization    Review of Systems   Gastrointestinal:  Negative for blood in stool.   Genitourinary:  Negative for dysuria.   Psychiatric/Behavioral:  Negative for agitation.      Objective:     Vital Signs (Most Recent):  Temp: 98.9 °F (37.2 °C) (03/23/24 1116)  Pulse: 78 (03/23/24 1116)  Resp: 19 (03/23/24 1116)  BP: (!) 144/82 (03/23/24 1116)  SpO2: (!) 89 % (03/23/24 1116) Vital Signs (24h Range):  Temp:  [98.6 °F (37 °C)-99.6 °F (37.6 °C)] 98.9 °F (37.2 °C)  Pulse:  [78-92] 78  Resp:  [17-25] 19  SpO2:  [89 %-93 %] 89 %  BP: (113-167)/(59-92) 144/82     Weight: 120.2 kg (264 lb 15.9 oz)  Body mass index is 39.13 kg/m².    Intake/Output Summary (Last 24 hours) at 3/23/2024 1410  Last data filed at 3/23/2024 0028  Gross per 24 hour   Intake --   Output 800 ml   Net -800 ml         Physical Exam  Constitutional:       General: He is not in acute distress.      Appearance: Normal appearance. He is well-developed. He is ill-appearing (chronically ill appearing). He is not diaphoretic.   HENT:      Head: Normocephalic and atraumatic.      Right Ear: External ear normal.      Left Ear: External ear normal.      Nose: Nose normal.   Eyes:      General:         Right eye: No discharge.         Left eye: No discharge.      Conjunctiva/sclera: Conjunctivae normal.   Pulmonary:      Effort: Pulmonary effort is normal. No respiratory distress.      Breath sounds: No stridor.   Musculoskeletal:      Comments: Legs wrapped   Skin:     General: Skin is dry.      Comments: Wounds of legs, back, buttocks   Neurological:      General: No focal deficit present.      Mental Status: He is alert.             Significant Labs: All pertinent labs within the past 24 hours have been reviewed.  CBC:   Recent Labs   Lab 03/22/24  0811 03/23/24  0451   WBC 7.26 8.14   HGB 7.2* 6.8*   HCT 24.3* 22.3*   * 491*     CMP:   Recent Labs   Lab 03/22/24  0811 03/23/24  0451    139   K 4.5 4.3    104   CO2 26 24   * 83   BUN 31* 29*   CREATININE 1.6* 1.6*   CALCIUM 7.9* 8.0*   PROT 6.7 6.5   ALBUMIN 2.0* 1.9*   BILITOT 0.3 0.2   ALKPHOS 80 78   AST 25 25   ALT 9* 8*   ANIONGAP 7* 11     POCT Glucose:   Recent Labs   Lab 03/22/24  2243 03/23/24  0811 03/23/24  1219   POCTGLUCOSE 185* 123* 130*       Significant Imaging: I have reviewed all pertinent imaging results/findings within the past 24 hours.    Assessment/Plan:      * Osteomyelitis of left lower extremity  Patient with Proteus and enterobacter on leg cultures collected by Podiatry with concern for acute on chronic osteo. Fungal culture grew fusarium. He has started spiking fever and still is not agreeable to amputation, will do infectious work up to look out for source.    -UA noninfectious   -Blood culture NGTD  - Inpatient consult to ID and vascular surgery who plans to do AKA on 3/27.  - IV ertapenem with end date 4/15/24  "  - Added voriconazole for fusarium coverage which grew on fungal culture - 3 months  - midline catheters placed on admission for difficult IV access      Urinary retention  Patient developed new urinary retention.     -Sams's placed   -Flomax  -Renal ultrasound showed no hydronephrosis      BPH (benign prostatic hyperplasia)  Sams in placed, placed by urology  Flomax daily      Localized swelling of right upper extremity  Patient has swelling and pain in the right arm and some swelling in the left arm as well    -US b/l upper extremity for DVT showed clot, eliquis transitioned to Heparin while patient is pre-op     History of Bedbug bite with infection  Patient started on Permethrin daily for 5 days starting 3/12      Proteus infection    On ertapenem to cover proteus and enterobacter from left leg wound cx    Chronic anticoagulation  - See DVT      History of amputation of left high mid foot   See osteo      Stage 3b chronic kidney disease  Creatine stable for now. BMP reviewed- noted Estimated Creatinine Clearance: 51.1 mL/min (A) (based on SCr of 1.6 mg/dL (H)). according to latest data. Based on current GFR, CKD stage is stage 3 - GFR 30-59.  Monitor UOP and serial BMP and adjust therapy as needed. Renally dose meds. Avoid nephrotoxic medications and procedures.    Severe sepsis  This patient does have evidence of infective focus  My overall impression is sepsis.  Source: Skin and Soft Tissue (location left leg)  Antibiotics given-   Antibiotics (72h ago, onward)      Start     Stop Route Frequency Ordered    03/08/24 1230  ertapenem (INVANZ) 1 g in sodium chloride 0.9 % 100 mL IVPB (MB+)         -- IV Every 24 hours (non-standard times) 03/08/24 1115          Latest lactate reviewed-  No results for input(s): "LACTATE", "POCLAC" in the last 72 hours.    Organ dysfunction indicated by Acute kidney injury    Fluid challenge Actual Body weight- Patient will receive 30ml/kg actual body weight to calculate fluid " bolus for treatment of septic shock.     Post- resuscitation assessment No - Post resuscitation assessment not needed       Will Not start Pressors-     - See osteo      Other hyperlipidemia  Continue home atorvastatin      Diabetic ketoacidosis without coma associated with type 2 diabetes mellitus  Patient's FSGs are uncontrolled due to hyperglycemia on current medication regimen.  Last A1c reviewed-   Lab Results   Component Value Date    HGBA1C >14.0 (H) 03/05/2024     Most recent fingerstick glucose reviewed-   Recent Labs   Lab 03/21/24  1622 03/21/24  2044 03/22/24  0829   POCTGLUCOSE 85 143* 286*       Current correctional scale   LD SSI  Maintain anti-hyperglycemic dose as follows-   Antihyperglycemics (From admission, onward)      Start     Stop Route Frequency Ordered    03/22/24 0900  insulin detemir U-100 (Levemir) pen 14 Units         -- SubQ 2 times daily 03/22/24 0616    03/20/24 1645  insulin aspart U-100 pen 2-4 Units         -- SubQ with dinner 03/20/24 0644    03/20/24 1130  insulin aspart U-100 pen 3-5 Units         -- SubQ with lunch 03/20/24 0644    03/20/24 0715  insulin aspart U-100 pen 3-5 Units         -- SubQ with breakfast 03/20/24 0644    03/11/24 1521  insulin aspart U-100 pen 0-5 Units         -- SubQ Before meals & nightly PRN 03/11/24 1520          Hold Oral hypoglycemics while patient is in the hospital.  Will keep patient on DKA pathway with insulin drip and fluid with protocol in place for switching to subcutaneous insulin as anion gap closes.      DKA  HHS  - Resolved  - Continue insulin regimen and BG checks TIDWM and QHS and 2am  - patient tends to become hypoglycemic around dinnertime  - could be insulin stacking from breakfast and lunch insulin aspart given poor PO intake despite assistance with feeding   - detemir 14 unit BID  - aspart scheduled w/ only breakfast and lunch and dinner with different units  -Inpatient consult to Endocrine    Paroxysmal atrial  fibrillation  Patient with Persistent (7 days or more) atrial fibrillation which is uncontrolled currently with    . Patient is currently in sinus rhythm.[id number] Score: 4. . Anticoagulation indicated. Anticoagulation done with lovenox as we could not get IV access for heparin  .    -Eliquis transitioned to Heparin in preparation for AKA.    Chronic deep vein thrombosis (DVT) of right upper extremity  - eliquis transitioned to Heparin      Iron deficiency anemia  Patient's anemia is currently uncontrolled. Has received 2 units of PRBCs on 3/23 and 3/16 . Etiology likely d/t chronic blood loss and Iron deficiency  Current CBC reviewed-   Lab Results   Component Value Date    HGB 6.8 (L) 03/23/2024    HCT 22.3 (L) 03/23/2024     Monitor serial CBC and transfuse if patient becomes hemodynamically unstable, symptomatic or H/H drops below 7/21.    Cellulitis of left lower leg  See osteo      Peripheral arterial disease  Resume plavix      YNES (acute kidney injury)  Patient with acute kidney injury/acute renal failure likely due to pre-renal azotemia due to dehydration and post-obstructive d/t urinary retention  YNES is currently stable. Baseline creatinine  1.1  - Labs reviewed- Renal function/electrolytes with Estimated Creatinine Clearance: 51.1 mL/min (A) (based on SCr of 1.6 mg/dL (H)). according to latest data. Monitor urine output and serial BMP and adjust therapy as needed. Avoid nephrotoxins and renally dose meds for GFR listed above.    Creatinine 2.1 on admit, baseline around 1.1  - prerenal vs obstructive (fung placed)     Lab Results   Component Value Date    CREATININE 1.6 (H) 03/23/2024       Plan:   - Strict I&Os and daily weights   - Daily BMP  - Trend sCr  - Avoid nephrotoxic agents such as NSAIDs, ACEi, ARBs and IV radiocontrast.  - Renally dose meds to current GFR.  - Maintain MAP > 65.  - No indications for HD at this time.   - Maintain electrolytes at goal: Mg > 2, Phos > 3, K > 4.            Insulin dependent type 2 diabetes mellitus  Patient's FSGs are uncontrolled due to hyperglycemia on current medication regimen.  Last A1c reviewed-   Lab Results   Component Value Date    HGBA1C >14.0 (H) 03/05/2024     Most recent fingerstick glucose reviewed-   Recent Labs   Lab 03/22/24  2017 03/22/24  2243 03/23/24  0811 03/23/24  1219   POCTGLUCOSE 238* 185* 123* 130*     Current correctional scale  Low  Increase anti-hyperglycemic dose as follows-   Antihyperglycemics (From admission, onward)      Start     Stop Route Frequency Ordered    03/24/24 0715  insulin aspart U-100 pen 4-6 Units         -- SubQ with breakfast 03/23/24 0913    03/22/24 0900  insulin detemir U-100 (Levemir) pen 14 Units         -- SubQ 2 times daily 03/22/24 0616    03/20/24 1645  insulin aspart U-100 pen 2-4 Units         -- SubQ with dinner 03/20/24 0644    03/20/24 1130  insulin aspart U-100 pen 3-5 Units         -- SubQ with lunch 03/20/24 0644    03/11/24 1521  insulin aspart U-100 pen 0-5 Units         -- SubQ Before meals & nightly PRN 03/11/24 1520          Hold Oral hypoglycemics while patient is in the hospital.    - appreciate endocrine's assistance     Essential hypertension  Chronic, uncontrolled. Latest blood pressure and vitals reviewed-     Temp:  [98.6 °F (37 °C)-99.6 °F (37.6 °C)]   Pulse:  [78-92]   Resp:  [17-25]   BP: (113-167)/(59-92)   SpO2:  [89 %-93 %] .   Home meds for hypertension were reviewed and noted below.   Hypertension Medications               furosemide (LASIX) 40 MG tablet Take 20 mg by mouth.    hydrALAZINE (APRESOLINE) 100 MG tablet Take 1 tablet (100 mg total) by mouth every 8 (eight) hours.    isosorbide dinitrate (ISORDIL) 10 MG tablet Take 1 tablet (10 mg total) by mouth 3 (three) times daily.    NIFEdipine (PROCARDIA-XL) 60 MG (OSM) 24 hr tablet Take 1 tablet (60 mg total) by mouth once daily.            While in the hospital, will manage blood pressure as follows; Adjust home  antihypertensive regimen as follows- will keep nifedipine - increased to 90     Will utilize p.r.n. blood pressure medication only if patient's blood pressure greater than 180/110 and he develops symptoms such as worsening chest pain or shortness of breath.      VTE Risk Mitigation (From admission, onward)           Ordered     heparin 25,000 units in dextrose 5% (100 units/ml) IV bolus from bag HIGH INTENSITY nomogram - [facility]  As needed (PRN)        Question:  Heparin Infusion Adjustment (DO NOT MODIFY ANSWER)  Answer:  \\[email or web address]\Images\Pharmacy\HeparinInfusions\heparin HIGH INTENSITY nomogram for [facility] [id number]    03/20/24 0744     heparin 25,000 units in dextrose 5% (100 units/ml) IV bolus from bag HIGH INTENSITY nomogram - [facility]  As needed (PRN)        Question:  Heparin Infusion Adjustment (DO NOT MODIFY ANSWER)  Answer:  \\[email or web address]\Images\Pharmacy\HeparinInfusions\heparin HIGH INTENSITY nomogram for [facility] [id number].pdf    03/20/24 0744     heparin 25,000 units in dextrose 5% 250 mL (100 units/mL) infusion HIGH INTENSITY nomogram - [facility]  Continuous        Question:  Begin at (units/kg/hr)  Answer:  18    03/20/24 0747                    Discharge Planning   OXANA: 3/29/2024     Code Status: Full Code   Is the patient medically ready for discharge?: No    Reason for patient still in hospital (select all that apply): Patient trending condition, Treatment, Consult recommendations, and Pending disposition  Discharge Plan A: Skilled Nursing Facility   Discharge Delays: None known at this time              Milagros Nicolas MD  Department of Hospital Medicine   [facility] - Telemetry Stepdown

## 2024-03-23 NOTE — SUBJECTIVE & OBJECTIVE
Interval History: Transfusing 1 unit prbc for hemoglobin 6.8. no apparent source of bleed. Patient denies acute bleeding. Likely from chronic iron deficiency/repeated lab draws during basically a month long hospitalization    Review of Systems   Gastrointestinal:  Negative for blood in stool.   Genitourinary:  Negative for dysuria.   Psychiatric/Behavioral:  Negative for agitation.      Objective:     Vital Signs (Most Recent):  Temp: 98.9 °F (37.2 °C) (03/23/24 1116)  Pulse: 78 (03/23/24 1116)  Resp: 19 (03/23/24 1116)  BP: (!) 144/82 (03/23/24 1116)  SpO2: (!) 89 % (03/23/24 1116) Vital Signs (24h Range):  Temp:  [98.6 °F (37 °C)-99.6 °F (37.6 °C)] 98.9 °F (37.2 °C)  Pulse:  [78-92] 78  Resp:  [17-25] 19  SpO2:  [89 %-93 %] 89 %  BP: (113-167)/(59-92) 144/82     Weight: 120.2 kg (264 lb 15.9 oz)  Body mass index is 39.13 kg/m².    Intake/Output Summary (Last 24 hours) at 3/23/2024 1410  Last data filed at 3/23/2024 0028  Gross per 24 hour   Intake --   Output 800 ml   Net -800 ml         Physical Exam  Constitutional:       General: He is not in acute distress.     Appearance: Normal appearance. He is well-developed. He is ill-appearing (chronically ill appearing). He is not diaphoretic.   HENT:      Head: Normocephalic and atraumatic.      Right Ear: External ear normal.      Left Ear: External ear normal.      Nose: Nose normal.   Eyes:      General:         Right eye: No discharge.         Left eye: No discharge.      Conjunctiva/sclera: Conjunctivae normal.   Pulmonary:      Effort: Pulmonary effort is normal. No respiratory distress.      Breath sounds: No stridor.   Musculoskeletal:      Comments: Legs wrapped   Skin:     General: Skin is dry.      Comments: Wounds of legs, back, buttocks   Neurological:      General: No focal deficit present.      Mental Status: He is alert.             Significant Labs: All pertinent labs within the past 24 hours have been reviewed.  CBC:   Recent Labs   Lab 03/22/24  0811  03/23/24  0451   WBC 7.26 8.14   HGB 7.2* 6.8*   HCT 24.3* 22.3*   * 491*     CMP:   Recent Labs   Lab 03/22/24  0811 03/23/24  0451    139   K 4.5 4.3    104   CO2 26 24   * 83   BUN 31* 29*   CREATININE 1.6* 1.6*   CALCIUM 7.9* 8.0*   PROT 6.7 6.5   ALBUMIN 2.0* 1.9*   BILITOT 0.3 0.2   ALKPHOS 80 78   AST 25 25   ALT 9* 8*   ANIONGAP 7* 11     POCT Glucose:   Recent Labs   Lab 03/22/24  2243 03/23/24  0811 03/23/24  1219   POCTGLUCOSE 185* 123* 130*       Significant Imaging: I have reviewed all pertinent imaging results/findings within the past 24 hours.

## 2024-03-23 NOTE — PROGRESS NOTES
[facility] - Telemetry Stepdown  Endocrinology  Progress Note    Admit Date: 3/5/2024     Reason for Consult: Management of T2DM, Hyperglycemia     Diabetes diagnosis year: > 25 years    Home Diabetes Medications:  Novolog 6 units w/ meals and Lantus 45 units nightly (states he is not taking).     How often checking glucose at home?  Not really checking    BG readings on regimen: 200's  Hypoglycemia on the regimen?  Yes (When he was taking the Lantus)  Missed doses on regimen?  Yes    Diabetes Complications include:     Hyperglycemia, Hypoglycemia , Diabetic chronic kidney disease     , Diabetic dermatitis, Foot ulcer  , and Periodontal disease    Complicating diabetes co morbidities:   HTN; HLD      HPI: Saji Castañeda is a 75 yo M with a history of Afib, T2D, HLD, HTN, chronic osteomyelitis of L heel, s/p L TMA, PID, ESBL Klebsiella and Acinetobacter bacteremia who was admitted after being found down at home and DKA. Endocrine consulted to manage hyperglycemia and type 2 diabetes.   Hemoglobin A1C   Date Value Ref Range Status   03/05/2024 >14.0 (H) 4.0 - 5.6 % Final     Comment:     ADA Screening Guidelines:  5.7-6.4%  Consistent with prediabetes  >or=6.5%  Consistent with diabetes    High levels of fetal hemoglobin interfere with the HbA1C  assay. Heterozygous hemoglobin variants (HbS, HgC, etc)do  not significantly interfere with this assay.   However, presence of multiple variants may affect accuracy.     09/13/2023 11.0 (H) 4.5 - 5.7 % Final   03/10/2023 8.4 (H) 4.0 - 5.6 % Final     Comment:     ADA Screening Guidelines:  5.7-6.4%  Consistent with prediabetes  >or=6.5%  Consistent with diabetes    High levels of fetal hemoglobin interfere with the HbA1C  assay. Heterozygous hemoglobin variants (HbS, HgC, etc)do  not significantly interfere with this assay.   However, presence of multiple variants may affect accuracy.     09/17/2022 9.9 (H) 4.0 - 5.6 % Final     Comment:     ADA Screening Guidelines:  5.7-6.4%   "Consistent with prediabetes  >or=6.5%  Consistent with diabetes    High levels of fetal hemoglobin interfere with the HbA1C  assay. Heterozygous hemoglobin variants (HbS, HgC, etc)do  not significantly interfere with this assay.   However, presence of multiple variants may affect accuracy.              Interval HPI:   Overnight events: No acute events overnight. Patient in room 8078/8078 A. Blood glucose stable. BG at and above goal on current insulin regimen (SSI, prandial, and basal insulin ). Steroid use- None.    Renal function- Abnormal - Creatinine 1.6  Vasopressors-  None       Endocrine will continue to follow and manage insulin orders inpatient.       Diet diabetic  Calorie  Diet NPO Except for: Medication     Eatin%  Nausea: No  Hypoglycemia and intervention: No  Fever: No  TPN and/or TF: No      BP (!) 167/92 (BP Location: Left arm, Patient Position: Lying)   Pulse 88   Temp 98.9 °F (37.2 °C) (Oral)   Resp 19   Ht 5' 9" (1.753 m)   Wt 120.2 kg (264 lb 15.9 oz)   SpO2 (!) 89%   BMI 39.13 kg/m²     Labs Reviewed and Include    Recent Labs   Lab 24  0451   GLU 83   CALCIUM 8.0*   ALBUMIN 1.9*   PROT 6.5      K 4.3   CO2 24      BUN 29*   CREATININE 1.6*   ALKPHOS 78   ALT 8*   AST 25   BILITOT 0.2       Lab Results   Component Value Date    WBC 8.14 2024    HGB 6.8 (L) 2024    HCT 22.3 (L) 2024    MCV 80 (L) 2024     (H) 2024     No results for input(s): "TSH", "FREET4" in the last 168 hours.  Lab Results   Component Value Date    HGBA1C >14.0 (H) 2024       Nutritional status:   Body mass index is 39.13 kg/m².  Lab Results   Component Value Date    ALBUMIN 1.9 (L) 2024    ALBUMIN 2.0 (L) 2024    ALBUMIN 1.7 (L) 2024     Lab Results   Component Value Date    PREALBUMIN 17 (L) 2014    PREALBUMIN 16 (L) 2014    PREALBUMIN 19 (L) 2014       Estimated Creatinine Clearance: 51.1 mL/min (A) (based on " SCr of 1.6 mg/dL (H)).    Accu-Checks  Recent Labs     03/21/24  0801 03/21/24  1215 03/21/24  1622 03/21/24  2044 03/22/24  0829 03/22/24  1216 03/22/24  1621 03/22/24 2017 03/22/24  2243 03/23/24  0811   POCTGLUCOSE 253* 249* 85 143* 286* 312* 169* 238* 185* 123*       Current Medications and/or Treatments Impacting Glycemic Control  Immunotherapy:    Immunosuppressants       None          Steroids:   Hormones (From admission, onward)      Start     Stop Route Frequency Ordered    03/05/24 1536  melatonin tablet 6 mg         -- Oral Nightly PRN 03/05/24 1437          Pressors:    Autonomic Drugs (From admission, onward)      None          Hyperglycemia/Diabetes Medications:   Antihyperglycemics (From admission, onward)      Start     Stop Route Frequency Ordered    03/24/24 0715  insulin aspart U-100 pen 4-6 Units         -- SubQ with breakfast 03/23/24 0913    03/22/24 0900  insulin detemir U-100 (Levemir) pen 14 Units         -- SubQ 2 times daily 03/22/24 0616    03/20/24 1645  insulin aspart U-100 pen 2-4 Units         -- SubQ with dinner 03/20/24 0644    03/20/24 1130  insulin aspart U-100 pen 3-5 Units         -- SubQ with lunch 03/20/24 0644    03/11/24 1521  insulin aspart U-100 pen 0-5 Units         -- SubQ Before meals & nightly PRN 03/11/24 1520            ASSESSMENT and PLAN    ID  * Osteomyelitis of left lower extremity  Optimize BG control to improve wound healing        Endocrine  Diabetic ketoacidosis without coma associated with type 2 diabetes mellitus  Resolved.         Insulin dependent type 2 diabetes mellitus  Endocrinology consulted for BG management.   BG goal 140-180    WBD 0.3 units/kg/day (Due to recurrent hypoglycemia taking a conservative approach to insulin dosing).   - Levemir (Insulin Detemir) 14 units BID   - Novolog (Insulin Aspart) 3-6 units with breakfast, 3-5 lunch and 2-4 units with dinner and prn for BG excursions LDC SSI (150/50) (range added due to fluctuating appetite)  -  BG checks AC/HS  - Hypoglycemia protocol in place  - If blood glucose greater than 300, please ask patient not to eat food or drink anything other than water until correctional insulin has brought it back below 250    ** Please notify Endocrine for any change and/or advance in diet**  ** Please call Endocrine for any BG related issues **    Discharge Planning:   TBD. Please notify endocrinology prior to discharge.  Plan Pending:  - Lantus 28 units nightly  - Novolog 5 units TIDWM  - Novolog SSI    150 - 200 + 2 unit    201 - 250 + 4 units    251 - 300 + 6 units    301 - 350 + 8 units       > 350   + 10 units  - Send logs in 3 days    Education:  Discharge Teaching:    Reviewed topics related to DM including: the need for insulin, how insulin works, what makes it a high risk medication, the importance of immediate follow up with either PCP or endocrine provider, importance of and how to check BG, how to record BG on logs, how to administer insulin, appropriate insulin administration sites, importance of rotating injection sites, hyper/hypoglycemia, how and when to treat hypoglycemia, when to hold insulin, how the correction scale works, importance of storing unused insulin in the refrigerator, and when to seek medical attention.  Patient verbalized understanding, answered all questions to patient's satisfaction. Blood sugar logs given to patient.     Hypoglycemia (Low Blood Sugar)  Too little glucose (sugar) in your blood is called hypoglycemia or low blood sugar. Diabetes itself doesnt cause low blood sugar. But some of the treatments for diabetes, such as pills or insulin, may increase your risk for it. Low blood sugar may cause you to lose consciousness or have a seizure. So always treat low blood sugar right away.    Special note: Always carry a source of fast-acting sugar and a snack in case of hypoglycemia     What You May Notice  If you have low blood sugar, you may have these symptoms:  Shakiness or  dizziness  Cold, clammy skin or sweating  Feelings of hunger  Headache  Nervousness  A hard, fast heartbeat  Weakness  Confusion or irritability  Blurred vision  What You Should Do  First, check your blood sugar. If it is too low (out of your target range), eat or drink 15 to 20 grams of fast-acting sugar. This may be 3 to 4 glucose tablets, 4 oz (half a cup) fruit juice or regular (non-diet) soda, 8 oz (one cup ) fat-free milk, or 1 tablespoon of honey. Dont take more than this, or your blood sugar may go too high.  Wait 15 minutes. Then recheck your blood sugar if you can.  If your blood sugar is still too low, repeat the steps above and check your blood sugar again. If your blood sugar still has not returned to your target range, contact your healthcare provider or seek emergency care.  Once your blood sugar returns to target range, eat a snack or meal.  Preventing Low Blood Sugar  Eat your meals and snacks at the same times each day. Dont skip meals!  Ask your healthcare provider if it is safe for you to drink alcohol. Never drink on an empty stomach.  Take your medication at the prescribed times.  Always carry a source of fast-acting sugar and a snack when youre away from home.  Other Things to Do  Carry a medical ID card or wear a medical alert bracelet or necklace. It should say that you have diabetes. It should also say what to do if you pass out or have a seizure.  Make sure your family, friends, and coworkers know the signs of low blood sugar. Tell them what to do if your blood sugar falls very low and you cant treat yourself.  Keep a glucagon emergency kit handy. Be sure your family, friends, and coworkers know how and when to use it. Check it regularly and replace the glucagon before it expires.  Talk to your healthcare team about other things you can do to prevent low blood sugar.    If you experience hypoglycemia several times, call your doctor.   © 1138-7172 [employer], [address] [place] [address]  Road, [place] [address]. All rights reserved. This information is not intended as a substitute for professional medical care. Always follow your healthcare professional's instructions.                  Chaka Dominguez DNP, FNP  Endocrinology  [facility] - Tele[facility] Stepdown

## 2024-03-23 NOTE — ASSESSMENT & PLAN NOTE
Endocrinology consulted for BG management.   BG goal 140-180    WBD 0.3 units/kg/day (Due to recurrent hypoglycemia taking a conservative approach to insulin dosing).   - Levemir (Insulin Detemir) 14 units BID   - Novolog (Insulin Aspart) 3-6 units with breakfast, 3-5 lunch and 2-4 units with dinner and prn for BG excursions LDC SSI (150/50) (range added due to fluctuating appetite)  - BG checks AC/HS  - Hypoglycemia protocol in place  - If blood glucose greater than 300, please ask patient not to eat food or drink anything other than water until correctional insulin has brought it back below 250    ** Please notify Endocrine for any change and/or advance in diet**  ** Please call Endocrine for any BG related issues **    Discharge Planning:   TBD. Please notify endocrinology prior to discharge.  Plan Pending:  - Lantus 28 units nightly  - Novolog 5 units TIDWM  - Novolog SSI    150 - 200 + 2 unit    201 - 250 + 4 units    251 - 300 + 6 units    301 - 350 + 8 units       > 350   + 10 units  - Send logs in 3 days    Education:  Discharge Teaching:    Reviewed topics related to DM including: the need for insulin, how insulin works, what makes it a high risk medication, the importance of immediate follow up with either PCP or endocrine provider, importance of and how to check BG, how to record BG on logs, how to administer insulin, appropriate insulin administration sites, importance of rotating injection sites, hyper/hypoglycemia, how and when to treat hypoglycemia, when to hold insulin, how the correction scale works, importance of storing unused insulin in the refrigerator, and when to seek medical attention.  Patient verbalized understanding, answered all questions to patient's satisfaction. Blood sugar logs given to patient.     Hypoglycemia (Low Blood Sugar)  Too little glucose (sugar) in your blood is called hypoglycemia or low blood sugar. Diabetes itself doesnt cause low blood sugar. But some of the  treatments for diabetes, such as pills or insulin, may increase your risk for it. Low blood sugar may cause you to lose consciousness or have a seizure. So always treat low blood sugar right away.    Special note: Always carry a source of fast-acting sugar and a snack in case of hypoglycemia     What You May Notice  If you have low blood sugar, you may have these symptoms:  Shakiness or dizziness  Cold, clammy skin or sweating  Feelings of hunger  Headache  Nervousness  A hard, fast heartbeat  Weakness  Confusion or irritability  Blurred vision  What You Should Do  First, check your blood sugar. If it is too low (out of your target range), eat or drink 15 to 20 grams of fast-acting sugar. This may be 3 to 4 glucose tablets, 4 oz (half a cup) fruit juice or regular (non-diet) soda, 8 oz (one cup ) fat-free milk, or 1 tablespoon of honey. Dont take more than this, or your blood sugar may go too high.  Wait 15 minutes. Then recheck your blood sugar if you can.  If your blood sugar is still too low, repeat the steps above and check your blood sugar again. If your blood sugar still has not returned to your target range, contact your healthcare provider or seek emergency care.  Once your blood sugar returns to target range, eat a snack or meal.  Preventing Low Blood Sugar  Eat your meals and snacks at the same times each day. Dont skip meals!  Ask your healthcare provider if it is safe for you to drink alcohol. Never drink on an empty stomach.  Take your medication at the prescribed times.  Always carry a source of fast-acting sugar and a snack when youre away from home.  Other Things to Do  Carry a medical ID card or wear a medical alert bracelet or necklace. It should say that you have diabetes. It should also say what to do if you pass out or have a seizure.  Make sure your family, friends, and coworkers know the signs of low blood sugar. Tell them what to do if your blood sugar falls very low and you cant treat  yourself.  Keep a glucagon emergency kit handy. Be sure your family, friends, and coworkers know how and when to use it. Check it regularly and replace the glucagon before it expires.  Talk to your healthcare team about other things you can do to prevent low blood sugar.    If you experience hypoglycemia several times, call your doctor.   © 5506-3621 [employer], [address] [place] [address], [place] [address]. All rights reserved. This information is not intended as a substitute for professional medical care. Always follow your healthcare professional's instructions.

## 2024-03-23 NOTE — PLAN OF CARE
Problem: Adult Inpatient Plan of Care  Goal: Plan of Care Review  3/23/2024 0517 by Lorena Mcbride RN  Outcome: Ongoing, Progressing  3/23/2024 0500 by Lorena Mcbride RN  Outcome: Ongoing, Progressing     Problem: Adult Inpatient Plan of Care  Goal: Patient-Specific Goal (Individualized)  3/23/2024 0517 by Lorena Mcbride RN  Outcome: Ongoing, Progressing  3/23/2024 0500 by Lorena Mcbride RN  Outcome: Ongoing, Progressing     Problem: Adult Inpatient Plan of Care  Goal: Absence of Hospital-Acquired Illness or Injury  3/23/2024 0517 by Lorena Mcbride RN  Outcome: Ongoing, Progressing  3/23/2024 0500 by Lorena Mcbride RN  Outcome: Ongoing, Progressing     Problem: Adult Inpatient Plan of Care  Goal: Optimal Comfort and Wellbeing  3/23/2024 0517 by Lorena Mcbride RN  Outcome: Ongoing, Progressing  3/23/2024 0500 by Lorena Mcbride RN  Outcome: Ongoing, Progressing     Problem: Adult Inpatient Plan of Care  Goal: Readiness for Transition of Care  3/23/2024 0517 by Lorena Mcbride RN  Outcome: Ongoing, Progressing  3/23/2024 0500 by Lorena Mcbride RN  Outcome: Ongoing, Progressing     Problem: Diabetes Comorbidity  Goal: Blood Glucose Level Within Targeted Range  3/23/2024 0517 by Lorena Mcbride RN  Outcome: Ongoing, Progressing  3/23/2024 0500 by Lorena Mcbride RN  Outcome: Ongoing, Progressing     Problem: Adjustment to Illness (Sepsis/Septic Shock)  Goal: Optimal Coping  3/23/2024 0517 by Lorena Mcbride RN  Outcome: Ongoing, Progressing  3/23/2024 0500 by Lorena Mcbride RN  Outcome: Ongoing, Progressing     Problem: Bleeding (Sepsis/Septic Shock)  Goal: Absence of Bleeding  3/23/2024 0517 by Lorena Mcbride RN  Outcome: Ongoing, Progressing  3/23/2024 0500 by Lorena Mcbride RN  Outcome: Ongoing, Progressing     Problem: Glycemic Control Impaired (Sepsis/Septic Shock)  Goal: Blood Glucose Level Within Desired Range  3/23/2024 0517 by Lorena Mcbride RN  Outcome: Ongoing,  Progressing  3/23/2024 0500 by Lorena Mcbride RN  Outcome: Ongoing, Progressing     Problem: Nutrition Impaired (Sepsis/Septic Shock)  Goal: Optimal Nutrition Intake  3/23/2024 0517 by Lorena Mcbride RN  Outcome: Ongoing, Progressing  3/23/2024 0500 by Lorena Mcbride RN  Outcome: Ongoing, Progressing     Problem: Infection  Goal: Absence of Infection Signs and Symptoms  3/23/2024 0517 by Lorena Mcbride RN  Outcome: Ongoing, Progressing  3/23/2024 0500 by Lorena Mcbride RN  Outcome: Ongoing, Progressing     Problem: Impaired Wound Healing  Goal: Optimal Wound Healing  3/23/2024 0517 by Lorena Mcbride RN  Outcome: Ongoing, Progressing  3/23/2024 0500 by Lorena Mcbride RN  Outcome: Ongoing, Progressing     Problem: Oral Intake Inadequate (Acute Kidney Injury/Impairment)  Goal: Optimal Nutrition Intake  3/23/2024 0517 by Lorena Mcbride RN  Outcome: Ongoing, Progressing  3/23/2024 0500 by Lorena Mcbride RN  Outcome: Ongoing, Progressing     Problem: Renal Function Impairment (Acute Kidney Injury/Impairment)  Goal: Effective Renal Function  3/23/2024 0517 by Lorena Mcbride RN  Outcome: Ongoing, Progressing  3/23/2024 0500 by Lorena Mcbride RN  Outcome: Ongoing, Progressing

## 2024-03-23 NOTE — PT/OT/SLP PROGRESS
Physical Therapy Co-Treatment    Patient Name:  Saji Castañeda   MRN:  [id number]    Recommendations:     Discharge Recommendations: Moderate Intensity Therapy  Discharge Equipment Recommendations: lift device, hospital bed, (Pressure relief mattress)   Barriers to discharge: Decreased caregiver support and Increased skilled assistance required    Assessment:   Co-evaluation/treatment performed due to patient's multiple deficits requiring two skilled therapists to appropriately and safely assess patient's strength, endurance, functional mobility, and ADL performance while facilitating functional tasks in addition to accommodating for patient's activity tolerance and medical acuity.    Saji Castañeda is a 74 y.o. male admitted with a medical diagnosis of Osteomyelitis of left lower extremity.  He presents with the following impairments/functional limitations: weakness, impaired endurance, impaired functional mobility, impaired balance, impaired cognition, decreased lower extremity function, decreased upper extremity function, decreased safety awareness, pain, orthopedic precautions, decreased ROM, impaired coordination, impaired skin, edema. Patient demonstrating increased motivation to participate in PM co-treat session, evidenced by desire to sit EOB. Patient continuing to require significant physical assistance for all bed mobility this date. Furthermore, inconsistent/variable levels of physical assistance for static sitting balance required, with strong R-sided lean observed. At this time, patient continues to demonstrate that they will greatly benefit from moderate intensity skilled physical therapy services post-acutely.    Rehab Prognosis: Poor; patient would benefit from acute skilled PT services to address these deficits and reach maximum level of function.    Recent Surgery: Procedure(s) (LRB):  AMPUTATION, ABOVE KNEE (Left)      Plan:     During this hospitalization, patient to be seen 2 x/week to address the  "identified rehab impairments via therapeutic activities, therapeutic exercises, neuromuscular re-education and progress toward the following goals:    Plan of Care Expires:  04/06/24    Subjective     Chief Complaint: LLE pain  Patient/Family Comments/goals: None stated  Pain/Comfort:  Pain Rating 1: Pain present, but not rated   Location - Side 1: Left  Location - Orientation 1: generalized  Location 1: leg  Pain Addressed 1: Reposition, Distraction, Cessation of Activity  Pain Rating Post-Intervention 1: Intensity not verbalized      Objective:     Communicated with RN prior to session.  Patient found supine with telemetry, pulse ox (continuous), fung catheter upon PT entry to room. OT present    General Precautions: Standard, fall, contact, diabetic   Orthopedic Precautions:LLE non weight bearing, RLE weight bearing as tolerated  Braces:  (Darco Shoe (RLE))   Body mass index is 39.13 kg/m².  Oxygen Device: Room Air  Vitals: BP (!) 130/59 (BP Location: Right arm, Patient Position: Lying)   Pulse 90   Temp 99.2 °F (37.3 °C) (Oral)   Resp 19   Ht 5' 9" (1.753 m)   Wt 120.2 kg (264 lb 15.9 oz)   SpO2 (!) 92%   BMI 39.13 kg/m²      Functional Mobility:  Bed Mobility: Cuing for breakdown of complex motor sequence into single steps    Rolling Left: x1, TotalAx2 with HOB Flat. Facilitation of UE/LE cross-body reaching  Rolling Right: x1, TotalAx2 with HOB Flat. Facilitation of UE/LE cross-body reaching  Scooting:   Anterior (EOB): TotalAx2  Posterior (EOB): TotalAx2 w/ gait belt  Turning (L - EOB): TotalAx2  Lateral (L/R - Supine): TotalAx2  Boosting: TotalAx2 with HOB Flat & HOB trendelenburg  Supine>Sit: x1,  TotalAx2-MaxAx2 with HOB Elevated  Sit>Supine: x1, MaxAx2 with HOB Flat    Transfers: Not assessed this date    Balance:   Sidelying (L): Maile Pt assisting w/ LUE via HHA  Static Sitting:  TotalAx2-CGAx2 . Strong lateral trunk lean to R, w/ cuing for correction to midline. Pt able to use LUE to perform " corrective WS, via bed rail, however inconsistent frequency & varying degree of WS. Facilitation to midline position required majority of time. Limited ability to sustain correction as well.  Dynamic Sitting:  TotalAx2-MaxAx2  Total Time Sitting: ~5 mins EOB    AM-PAC 6 CLICK MOBILITY  Turning over in bed (including adjusting bedclothes, sheets and blankets)?: 2  Sitting down on and standing up from a chair with arms (e.g., wheelchair, bedside commode, etc.): 1  Moving from lying on back to sitting on the side of the bed?: 2  Moving to and from a bed to a chair (including a wheelchair)?: 1  Need to walk in hospital room?: 1  Climbing 3-5 steps with a railing?: 1  Basic Mobility Total Score: 8     Treatment & Education:  Patient Education Provided on:  The role of physical therapy and how the patient can benefit from skilled services  The negative effects of prolonged bed rest/sedentary behavior, along with the importance of OOB activity & patient participation with PT  The importance of contacting RN, via call light, for mobility throughout the day  Pt white board updated with current therapists name and level of mobility assistance needed.     Patient left supine with all lines intact, RN notified, and OT present..    GOALS:   Multidisciplinary Problems       Physical Therapy Goals          Problem: Physical Therapy    Goal Priority Disciplines Outcome Goal Variances Interventions   Physical Therapy Goal     PT, PT/OT Unable to Meet, Plan Revised     Description: Goals to be met by: 24, extended to 24    Patient will increase functional independence with mobility by performin. Supine to sit with Maximum Assistance  2. Sit to supine with Maximum Assistance  3. Rolling to Left and Right with Moderate Assistance  4. Sit to stand transfer with Maximum Assistance with LRAD  5. Bed to chair transfer with Maximum Assistance using LRAD  6. Sitting at edge of bed 10 minutes with Contact Guard  Assistance  7. Lower extremity exercise program x15 reps per handout, with assistance as needed                         Time Tracking:     PT Received On: 03/22/24  PT Start Time: 1146     PT Stop Time: 1201  PT Total Time (min): 15 min     Billable Minutes: Neuromuscular Re-education 15    Treatment Type: Treatment  PT/PTA: PT     Number of PTA visits since last PT visit: 0     03/22/2024

## 2024-03-23 NOTE — ASSESSMENT & PLAN NOTE
Chronic, uncontrolled. Latest blood pressure and vitals reviewed-     Temp:  [98.6 °F (37 °C)-99.6 °F (37.6 °C)]   Pulse:  [78-92]   Resp:  [17-25]   BP: (113-167)/(59-92)   SpO2:  [89 %-93 %] .   Home meds for hypertension were reviewed and noted below.   Hypertension Medications               furosemide (LASIX) 40 MG tablet Take 20 mg by mouth.    hydrALAZINE (APRESOLINE) 100 MG tablet Take 1 tablet (100 mg total) by mouth every 8 (eight) hours.    isosorbide dinitrate (ISORDIL) 10 MG tablet Take 1 tablet (10 mg total) by mouth 3 (three) times daily.    NIFEdipine (PROCARDIA-XL) 60 MG (OSM) 24 hr tablet Take 1 tablet (60 mg total) by mouth once daily.            While in the hospital, will manage blood pressure as follows; Adjust home antihypertensive regimen as follows- will keep nifedipine - increased to 90     Will utilize p.r.n. blood pressure medication only if patient's blood pressure greater than 180/110 and he develops symptoms such as worsening chest pain or shortness of breath.

## 2024-03-23 NOTE — ASSESSMENT & PLAN NOTE
Patient with acute kidney injury/acute renal failure likely due to pre-renal azotemia due to dehydration and post-obstructive d/t urinary retention  YENS is currently stable. Baseline creatinine  1.1  - Labs reviewed- Renal function/electrolytes with Estimated Creatinine Clearance: 51.1 mL/min (A) (based on SCr of 1.6 mg/dL (H)). according to latest data. Monitor urine output and serial BMP and adjust therapy as needed. Avoid nephrotoxins and renally dose meds for GFR listed above.    Creatinine 2.1 on admit, baseline around 1.1  - prerenal vs obstructive (fung placed)     Lab Results   Component Value Date    CREATININE 1.6 (H) 03/23/2024       Plan:   - Strict I&Os and daily weights   - Daily BMP  - Trend sCr  - Avoid nephrotoxic agents such as NSAIDs, ACEi, ARBs and IV radiocontrast.  - Renally dose meds to current GFR.  - Maintain MAP > 65.  - No indications for HD at this time.   - Maintain electrolytes at goal: Mg > 2, Phos > 3, K > 4.

## 2024-03-23 NOTE — SUBJECTIVE & OBJECTIVE
"Interval HPI:   Overnight events: No acute events overnight. Patient in room 8078/8078 A. Blood glucose stable. BG at and above goal on current insulin regimen (SSI, prandial, and basal insulin ). Steroid use- None.    Renal function- Abnormal - Creatinine 1.6  Vasopressors-  None       Endocrine will continue to follow and manage insulin orders inpatient.       Diet diabetic  Calorie  Diet NPO Except for: Medication     Eatin%  Nausea: No  Hypoglycemia and intervention: No  Fever: No  TPN and/or TF: No      BP (!) 167/92 (BP Location: Left arm, Patient Position: Lying)   Pulse 88   Temp 98.9 °F (37.2 °C) (Oral)   Resp 19   Ht 5' 9" (1.753 m)   Wt 120.2 kg (264 lb 15.9 oz)   SpO2 (!) 89%   BMI 39.13 kg/m²     Labs Reviewed and Include    Recent Labs   Lab 24  0451   GLU 83   CALCIUM 8.0*   ALBUMIN 1.9*   PROT 6.5      K 4.3   CO2 24      BUN 29*   CREATININE 1.6*   ALKPHOS 78   ALT 8*   AST 25   BILITOT 0.2       Lab Results   Component Value Date    WBC 8.14 2024    HGB 6.8 (L) 2024    HCT 22.3 (L) 2024    MCV 80 (L) 2024     (H) 2024     No results for input(s): "TSH", "FREET4" in the last 168 hours.  Lab Results   Component Value Date    HGBA1C >14.0 (H) 2024       Nutritional status:   Body mass index is 39.13 kg/m².  Lab Results   Component Value Date    ALBUMIN 1.9 (L) 2024    ALBUMIN 2.0 (L) 2024    ALBUMIN 1.7 (L) 2024     Lab Results   Component Value Date    PREALBUMIN 17 (L) 2014    PREALBUMIN 16 (L) 2014    PREALBUMIN 19 (L) 2014       Estimated Creatinine Clearance: 51.1 mL/min (A) (based on SCr of 1.6 mg/dL (H)).    Accu-Checks  Recent Labs     24  0801 24  1215 24  1622 24  2044 24  0829 24  1216 24  1621 24  2017 24  2243 24  0811   POCTGLUCOSE 253* 249* 85 143* 286* 312* 169* 238* 185* 123*       Current Medications and/or " Treatments Impacting Glycemic Control  Immunotherapy:    Immunosuppressants       None          Steroids:   Hormones (From admission, onward)      Start     Stop Route Frequency Ordered    03/05/24 1536  melatonin tablet 6 mg         -- Oral Nightly PRN 03/05/24 1437          Pressors:    Autonomic Drugs (From admission, onward)      None          Hyperglycemia/Diabetes Medications:   Antihyperglycemics (From admission, onward)      Start     Stop Route Frequency Ordered    03/24/24 0715  insulin aspart U-100 pen 4-6 Units         -- SubQ with breakfast 03/23/24 0913    03/22/24 0900  insulin detemir U-100 (Levemir) pen 14 Units         -- SubQ 2 times daily 03/22/24 0616    03/20/24 1645  insulin aspart U-100 pen 2-4 Units         -- SubQ with dinner 03/20/24 0644    03/20/24 1130  insulin aspart U-100 pen 3-5 Units         -- SubQ with lunch 03/20/24 0644    03/11/24 1521  insulin aspart U-100 pen 0-5 Units         -- SubQ Before meals & nightly PRN 03/11/24 1520

## 2024-03-23 NOTE — NURSING
Dr White notified APTT 92.1, no sign of bleeding. Pt has been therapeutic previously for couple of days. MD ordered to hold Heparin and redraw APTT level, if therapeutic on redraw, can restart from previous rate. Will pass it onto morning nurse.

## 2024-03-23 NOTE — ASSESSMENT & PLAN NOTE
Patient developed new urinary retention.     -Sams's placed   -Flomax  -Renal ultrasound showed no hydronephrosis

## 2024-03-23 NOTE — NURSING
"Report received from BEREKET Lea. Patient remains free from falls and injury. NADN. VSS. Questions encouraged; patient unable to participate at this time. Patient refusing oral care, turning, and hygiene assistance at this time, says "Come back later, I don't want to be bothered." Bed locked and in low position; safety maintained. Call light in reach. White board updated, and explained. No complaint of pain, n/v, diarrhea, or SOB.  Will continue to monitor, will continue with plan of care.   "

## 2024-03-23 NOTE — ASSESSMENT & PLAN NOTE
Patient's anemia is currently uncontrolled. Has received 2 units of PRBCs on 3/23 and 3/16 . Etiology likely d/t chronic blood loss and Iron deficiency  Current CBC reviewed-   Lab Results   Component Value Date    HGB 6.8 (L) 03/23/2024    HCT 22.3 (L) 03/23/2024     Monitor serial CBC and transfuse if patient becomes hemodynamically unstable, symptomatic or H/H drops below 7/21.

## 2024-03-23 NOTE — ASSESSMENT & PLAN NOTE
Patient has swelling and pain in the right arm and some swelling in the left arm as well    -US b/l upper extremity for DVT showed clot, eliquis transitioned to Heparin while patient is pre-op

## 2024-03-23 NOTE — ASSESSMENT & PLAN NOTE
Patient's FSGs are uncontrolled due to hyperglycemia on current medication regimen.  Last A1c reviewed-   Lab Results   Component Value Date    HGBA1C >14.0 (H) 03/05/2024     Most recent fingerstick glucose reviewed-   Recent Labs   Lab 03/22/24  2017 03/22/24  2243 03/23/24  0811 03/23/24  1219   POCTGLUCOSE 238* 185* 123* 130*     Current correctional scale  Low  Increase anti-hyperglycemic dose as follows-   Antihyperglycemics (From admission, onward)      Start     Stop Route Frequency Ordered    03/24/24 0715  insulin aspart U-100 pen 4-6 Units         -- SubQ with breakfast 03/23/24 0913    03/22/24 0900  insulin detemir U-100 (Levemir) pen 14 Units         -- SubQ 2 times daily 03/22/24 0616    03/20/24 1645  insulin aspart U-100 pen 2-4 Units         -- SubQ with dinner 03/20/24 0644    03/20/24 1130  insulin aspart U-100 pen 3-5 Units         -- SubQ with lunch 03/20/24 0644    03/11/24 1521  insulin aspart U-100 pen 0-5 Units         -- SubQ Before meals & nightly PRN 03/11/24 1520          Hold Oral hypoglycemics while patient is in the hospital.    - appreciate endocrine's assistance

## 2024-03-24 LAB
ALBUMIN SERPL BCP-MCNC: 1.8 G/DL (ref 3.5–5.2)
ALP SERPL-CCNC: 79 U/L (ref 55–135)
ALT SERPL W/O P-5'-P-CCNC: 9 U/L (ref 10–44)
ANION GAP SERPL CALC-SCNC: 9 MMOL/L (ref 8–16)
APTT PPP: 56.5 SEC (ref 21–32)
AST SERPL-CCNC: 26 U/L (ref 10–40)
BACTERIA BLD CULT: NORMAL
BACTERIA BLD CULT: NORMAL
BASOPHILS # BLD AUTO: 0.03 K/UL (ref 0–0.2)
BASOPHILS NFR BLD: 0.4 % (ref 0–1.9)
BILIRUB SERPL-MCNC: 0.3 MG/DL (ref 0.1–1)
BUN SERPL-MCNC: 25 MG/DL (ref 8–23)
CALCIUM SERPL-MCNC: 8.4 MG/DL (ref 8.7–10.5)
CHLORIDE SERPL-SCNC: 107 MMOL/L (ref 95–110)
CO2 SERPL-SCNC: 26 MMOL/L (ref 23–29)
CREAT SERPL-MCNC: 1.4 MG/DL (ref 0.5–1.4)
DIFFERENTIAL METHOD BLD: ABNORMAL
EOSINOPHIL # BLD AUTO: 0.2 K/UL (ref 0–0.5)
EOSINOPHIL NFR BLD: 1.8 % (ref 0–8)
ERYTHROCYTE [DISTWIDTH] IN BLOOD BY AUTOMATED COUNT: 15.9 % (ref 11.5–14.5)
EST. GFR  (NO RACE VARIABLE): 52.4 ML/MIN/1.73 M^2
GLUCOSE SERPL-MCNC: 77 MG/DL (ref 70–110)
HCT VFR BLD AUTO: 26.5 % (ref 40–54)
HGB BLD-MCNC: 8.1 G/DL (ref 14–18)
IMM GRANULOCYTES # BLD AUTO: 0.02 K/UL (ref 0–0.04)
IMM GRANULOCYTES NFR BLD AUTO: 0.2 % (ref 0–0.5)
LYMPHOCYTES # BLD AUTO: 1.1 K/UL (ref 1–4.8)
LYMPHOCYTES NFR BLD: 13.4 % (ref 18–48)
MCH RBC QN AUTO: 25.1 PG (ref 27–31)
MCHC RBC AUTO-ENTMCNC: 30.6 G/DL (ref 32–36)
MCV RBC AUTO: 82 FL (ref 82–98)
MONOCYTES # BLD AUTO: 0.6 K/UL (ref 0.3–1)
MONOCYTES NFR BLD: 7.4 % (ref 4–15)
NEUTROPHILS # BLD AUTO: 6.4 K/UL (ref 1.8–7.7)
NEUTROPHILS NFR BLD: 76.8 % (ref 38–73)
NRBC BLD-RTO: 0 /100 WBC
PLATELET # BLD AUTO: 455 K/UL (ref 150–450)
PMV BLD AUTO: 9.4 FL (ref 9.2–12.9)
POCT GLUCOSE: 117 MG/DL (ref 70–110)
POCT GLUCOSE: 83 MG/DL (ref 70–110)
POTASSIUM SERPL-SCNC: 3.9 MMOL/L (ref 3.5–5.1)
PROT SERPL-MCNC: 7 G/DL (ref 6–8.4)
RBC # BLD AUTO: 3.23 M/UL (ref 4.6–6.2)
SODIUM SERPL-SCNC: 142 MMOL/L (ref 136–145)
WBC # BLD AUTO: 8.37 K/UL (ref 3.9–12.7)

## 2024-03-24 PROCEDURE — 20600001 HC STEP DOWN PRIVATE ROOM

## 2024-03-24 PROCEDURE — 85025 COMPLETE CBC W/AUTO DIFF WBC: CPT

## 2024-03-24 PROCEDURE — 99232 SBSQ HOSP IP/OBS MODERATE 35: CPT | Mod: ,,, | Performed by: NURSE PRACTITIONER

## 2024-03-24 PROCEDURE — 25000003 PHARM REV CODE 250

## 2024-03-24 PROCEDURE — 63600175 PHARM REV CODE 636 W HCPCS

## 2024-03-24 PROCEDURE — A4216 STERILE WATER/SALINE, 10 ML: HCPCS

## 2024-03-24 PROCEDURE — 36415 COLL VENOUS BLD VENIPUNCTURE: CPT | Performed by: FAMILY MEDICINE

## 2024-03-24 PROCEDURE — 25000003 PHARM REV CODE 250: Performed by: INTERNAL MEDICINE

## 2024-03-24 PROCEDURE — 80053 COMPREHEN METABOLIC PANEL: CPT

## 2024-03-24 PROCEDURE — 27000207 HC ISOLATION

## 2024-03-24 PROCEDURE — 85730 THROMBOPLASTIN TIME PARTIAL: CPT | Performed by: FAMILY MEDICINE

## 2024-03-24 PROCEDURE — 63600175 PHARM REV CODE 636 W HCPCS: Performed by: PHYSICIAN ASSISTANT

## 2024-03-24 PROCEDURE — 25000003 PHARM REV CODE 250: Performed by: PHYSICIAN ASSISTANT

## 2024-03-24 RX ADMIN — GABAPENTIN 300 MG: 300 CAPSULE ORAL at 09:03

## 2024-03-24 RX ADMIN — VORICONAZOLE 200 MG: 200 TABLET, FILM COATED ORAL at 09:03

## 2024-03-24 RX ADMIN — POLYETHYLENE GLYCOL 3350 17 G: 17 POWDER, FOR SOLUTION ORAL at 09:03

## 2024-03-24 RX ADMIN — CLOPIDOGREL BISULFATE 75 MG: 75 TABLET ORAL at 09:03

## 2024-03-24 RX ADMIN — HEPARIN SODIUM AND DEXTROSE 14 UNITS/KG/HR: 10000; 5 INJECTION INTRAVENOUS at 01:03

## 2024-03-24 RX ADMIN — TAMSULOSIN HYDROCHLORIDE 0.4 MG: 0.4 CAPSULE ORAL at 09:03

## 2024-03-24 RX ADMIN — COLLAGENASE SANTYL: 250 OINTMENT TOPICAL at 09:03

## 2024-03-24 RX ADMIN — PANTOPRAZOLE SODIUM 40 MG: 40 TABLET, DELAYED RELEASE ORAL at 09:03

## 2024-03-24 RX ADMIN — ERTAPENEM 1 G: 1 INJECTION INTRAMUSCULAR; INTRAVENOUS at 12:03

## 2024-03-24 RX ADMIN — NIFEDIPINE 90 MG: 30 TABLET, FILM COATED, EXTENDED RELEASE ORAL at 09:03

## 2024-03-24 RX ADMIN — HEPARIN SODIUM AND DEXTROSE 14 UNITS/KG/HR: 10000; 5 INJECTION INTRAVENOUS at 07:03

## 2024-03-24 RX ADMIN — SENNOSIDES 8.6 MG: 8.6 TABLET, FILM COATED ORAL at 09:03

## 2024-03-24 RX ADMIN — ATORVASTATIN CALCIUM 40 MG: 40 TABLET, FILM COATED ORAL at 09:03

## 2024-03-24 RX ADMIN — Medication 10 ML: at 05:03

## 2024-03-24 NOTE — ASSESSMENT & PLAN NOTE
Patient's anemia is currently uncontrolled. Has received 2 units of PRBCs on 3/23 and 3/16 . Etiology likely d/t chronic blood loss and Iron deficiency  Current CBC reviewed-   Lab Results   Component Value Date    HGB 8.1 (L) 03/24/2024    HCT 26.5 (L) 03/24/2024     Monitor serial CBC and transfuse if patient becomes hemodynamically unstable, symptomatic or H/H drops below 7/21.

## 2024-03-24 NOTE — SUBJECTIVE & OBJECTIVE
"Interval HPI:   Overnight events: No acute events overnight. Patient in room 8078/8078 A. Blood glucose stable. BG at and above goal on current insulin regimen (SSI, prandial, and basal insulin ). Steroid use- None.    Renal function- Creatinine 1.4  Vasopressors-  None       Endocrine will continue to follow and manage insulin orders inpatient.       Diet diabetic 2000 Calorie  Diet NPO Except for: Medication     Eatin%  Nausea: No  Hypoglycemia and intervention: No  Fever: No  TPN and/or TF: No      BP (!) 129/58   Pulse 86   Temp 99.6 °F (37.6 °C) (Oral)   Resp 16   Ht 5' 9" (1.753 m)   Wt 120.2 kg (264 lb 15.9 oz)   SpO2 95%   BMI 39.13 kg/m²     Labs Reviewed and Include    Recent Labs   Lab 24  0506   GLU 77   CALCIUM 8.4*   ALBUMIN 1.8*   PROT 7.0      K 3.9   CO2 26      BUN 25*   CREATININE 1.4   ALKPHOS 79   ALT 9*   AST 26   BILITOT 0.3       Lab Results   Component Value Date    WBC 8.37 2024    HGB 8.1 (L) 2024    HCT 26.5 (L) 2024    MCV 82 2024     (H) 2024     No results for input(s): "TSH", "FREET4" in the last 168 hours.  Lab Results   Component Value Date    HGBA1C >14.0 (H) 2024       Nutritional status:   Body mass index is 39.13 kg/m².  Lab Results   Component Value Date    ALBUMIN 1.8 (L) 2024    ALBUMIN 1.9 (L) 2024    ALBUMIN 2.0 (L) 2024     Lab Results   Component Value Date    PREALBUMIN 17 (L) 2014    PREALBUMIN 16 (L) 2014    PREALBUMIN 19 (L) 2014       Estimated Creatinine Clearance: 58.4 mL/min (based on SCr of 1.4 mg/dL).    Accu-Checks  Recent Labs     24  0829 24  1216 24  1621 24  2243 24  0811 24  1219 24  1619 24   POCTGLUCOSE 143* 286* 312* 169* 238* 185* 123* 130* 141* 171*       Current Medications and/or Treatments Impacting Glycemic Control  Immunotherapy:    Immunosuppressants  "      None          Steroids:   Hormones (From admission, onward)      Start     Stop Route Frequency Ordered    03/05/24 1536  melatonin tablet 6 mg         -- Oral Nightly PRN 03/05/24 1437          Pressors:    Autonomic Drugs (From admission, onward)      None          Hyperglycemia/Diabetes Medications:   Antihyperglycemics (From admission, onward)      Start     Stop Route Frequency Ordered    03/24/24 0715  insulin aspart U-100 pen 4-6 Units         -- SubQ with breakfast 03/23/24 0913    03/22/24 0900  insulin detemir U-100 (Levemir) pen 14 Units         -- SubQ 2 times daily 03/22/24 0616    03/20/24 1645  insulin aspart U-100 pen 2-4 Units         -- SubQ with dinner 03/20/24 0644    03/20/24 1130  insulin aspart U-100 pen 3-5 Units         -- SubQ with lunch 03/20/24 0644    03/11/24 1521  insulin aspart U-100 pen 0-5 Units         -- SubQ Before meals & nightly PRN 03/11/24 1520

## 2024-03-24 NOTE — ASSESSMENT & PLAN NOTE
Patient with Persistent (7 days or more) atrial fibrillation which is uncontrolled currently with    . Patient is currently in sinus rhythm.[id number] Score: 4. . Anticoagulation indicated. Anticoagulation done with lovenox as we could not get IV access for heparin  .    -Eliquis transitioned to Heparin in preparation for AKA.

## 2024-03-24 NOTE — SUBJECTIVE & OBJECTIVE
Interval History: NAEON, VS, Hb stable this morning after receiving a unit yesterday.            Objective:     Vital Signs (Most Recent):  Temp: 99.6 °F (37.6 °C) (03/24/24 0401)  Pulse: 86 (03/24/24 0401)  Resp: 16 (03/24/24 0401)  BP: (!) 129/58 (03/24/24 0401)  SpO2: 95 % (03/24/24 0401) Vital Signs (24h Range):  Temp:  [98.5 °F (36.9 °C)-99.7 °F (37.6 °C)] 99.6 °F (37.6 °C)  Pulse:  [76-99] 86  Resp:  [16-24] 16  SpO2:  [89 %-97 %] 95 %  BP: (105-152)/(58-82) 129/58     Weight: 120.2 kg (264 lb 15.9 oz)  Body mass index is 39.13 kg/m².    Intake/Output Summary (Last 24 hours) at 3/24/2024 0803  Last data filed at 3/24/2024 0619  Gross per 24 hour   Intake 784.83 ml   Output 850 ml   Net -65.17 ml         Physical Exam      Constitutional:       General: He is not in acute distress.     Appearance: Normal appearance. He is well-developed. He is ill-appearing (chronically ill appearing). He is not diaphoretic.   HENT:      Head: Normocephalic and atraumatic.      Right Ear: External ear normal.      Left Ear: External ear normal.      Nose: Nose normal.   Eyes:      General:         Right eye: No discharge.         Left eye: No discharge.      Conjunctiva/sclera: Conjunctivae normal.   Pulmonary:      Effort: Pulmonary effort is normal. No respiratory distress.      Breath sounds: No stridor.   Musculoskeletal:      Comments: Legs wrapped   Skin:     General: Skin is dry.      Comments: Wounds of legs, back, buttocks   Neurological:      General: No focal deficit present.      Mental Status: He is alert and oriented sometimes gets agitated and hallucinate but has been redirectable   Mood has been irritable     Significant Labs: All pertinent labs within the past 24 hours have been reviewed.    Significant Imaging: I have reviewed all pertinent imaging results/findings within the past 24 hours.

## 2024-03-24 NOTE — ASSESSMENT & PLAN NOTE
Patient's FSGs are uncontrolled due to hyperglycemia on current medication regimen.  Last A1c reviewed-   Lab Results   Component Value Date    HGBA1C >14.0 (H) 03/05/2024     Most recent fingerstick glucose reviewed-   Recent Labs   Lab 03/23/24  0811 03/23/24  1219 03/23/24  1619 03/23/24 2059   POCTGLUCOSE 123* 130* 141* 171*       Current correctional scale   LD SSI  Maintain anti-hyperglycemic dose as follows-   Antihyperglycemics (From admission, onward)    Start     Stop Route Frequency Ordered    03/24/24 0715  insulin aspart U-100 pen 4-6 Units         -- SubQ with breakfast 03/23/24 0913    03/22/24 0900  insulin detemir U-100 (Levemir) pen 14 Units         -- SubQ 2 times daily 03/22/24 0616    03/20/24 1645  insulin aspart U-100 pen 2-4 Units         -- SubQ with dinner 03/20/24 0644    03/20/24 1130  insulin aspart U-100 pen 3-5 Units         -- SubQ with lunch 03/20/24 0644    03/11/24 1521  insulin aspart U-100 pen 0-5 Units         -- SubQ Before meals & nightly PRN 03/11/24 1520        Hold Oral hypoglycemics while patient is in the hospital.  Will keep patient on DKA pathway with insulin drip and fluid with protocol in place for switching to subcutaneous insulin as anion gap closes.      DKA  HHS  - Resolved  - Continue insulin regimen and BG checks TIDWM and QHS and 2am  - patient tends to become hypoglycemic around dinnertime  - could be insulin stacking from breakfast and lunch insulin aspart given poor PO intake despite assistance with feeding   - detemir 14 unit BID  - aspart scheduled w/ only breakfast and lunch and dinner with different units  -Inpatient consult to Endocrine

## 2024-03-24 NOTE — PROGRESS NOTES
[facility] - Telemetry [facility] Medicine  Progress Note    Patient Name: Saji Castañeda  MRN: [id number]  Patient Class: IP- Inpatient   Admission Date: 3/5/2024  Length of Stay: 19 days  Attending Physician: Mahendra Collins III*  Primary Care Provider: [facility] -        Subjective:     Principal Problem:Osteomyelitis of left lower extremity        HPI:  Saji Castañeda is a 74 y.o. male with a medical history of DM2, HLD, HTN, chronic osteomyelitis of L heel, L TMA, PAD, hx of ESBL klebsiella, acinetobacter bacteremia, presenting with hyperglycemia. He presented to the ED via EMS and his POCT glucose on arrival was >500. Serum blood glucose 667 with anion gap of 17 and elevated ketones. Serum potassium 3.4, corrected sodium 149. Urinalysis significant for RBCs, glucosuria, and moderate bacteria and yeast. CBC revealed leukocytosis, elevated platelets, and mild anemia. CMP shows Na 135, K 3.4, BUN 29, Cr 2.1, Glucose 677, Lactate 4.61. BNP and troponin elevated. GFR 32.2. Insulin drip, IV fluids, zosyn, vancomycin, and potassium given.      He is unable to provide much history, but states that he has not been taking his home medications for at least a week. He reports shortness of breath, fatigue, and weakness for the last 6-7 days. He has chills and reports episodes of emesis. He denies abdominal pain, chest pain, palpitations, recent illness/infection, fevers, changes to bowel movements and urinary output. Patient lives with his brother and sister at home. He was last seen in the ED on 2/21 after a fall. He was hypoglycemic BGL 60 at that time which returned to normal after eating. He was also scheduled for a wound clinic appointment today 3/5 at [facility] with Dr. Wilson.        Overview/Hospital Course:  Wound care and cultures collected by Podiatry with recs for Vascular consult for amputation. Patient declined amputation. ID recommending IV Ertapenem for chronic osteomyelitis for 6 week  duration (EED: 4/15/24). With high wound care needs and IV antibiotics plan patient needing long-term placement. He is being treated with topical permethrin for bedbugs. Wound care has been dressing the wounds. 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 Still adjusting insulin to control sugars better. Giving fluids for YNES. Poor PO intake even with feeding assistance. LTAC authorization denied by [employer]. Waiting for SNF placement ([facility], awaiting auth and family to complete paperwork). Patient started spiking fever again and infectious work up done. ID re-consulted. Patient has been counseled multiple times that source control cannot be achieved without amputation. Patient agreeable to AKA scheduled for 3/27. Transitioned to heparin from eliquis. Sams placed for urinary rentention.      Interval History: NAEON, VS, Hb stable this morning after receiving a unit yesterday.            Objective:     Vital Signs (Most Recent):  Temp: 99.6 °F (37.6 °C) (03/24/24 0401)  Pulse: 86 (03/24/24 0401)  Resp: 16 (03/24/24 0401)  BP: (!) 129/58 (03/24/24 0401)  SpO2: 95 % (03/24/24 0401) Vital Signs (24h Range):  Temp:  [98.5 °F (36.9 °C)-99.7 °F (37.6 °C)] 99.6 °F (37.6 °C)  Pulse:  [76-99] 86  Resp:  [16-24] 16  SpO2:  [89 %-97 %] 95 %  BP: (105-152)/(58-82) 129/58     Weight: 120.2 kg (264 lb 15.9 oz)  Body mass index is 39.13 kg/m².    Intake/Output Summary (Last 24 hours) at 3/24/2024 0803  Last data filed at 3/24/2024 0619  Gross per 24 hour   Intake 784.83 ml   Output 850 ml   Net -65.17 ml         Physical Exam      Constitutional:       General: He is not in acute distress.     Appearance: Normal appearance. He is well-developed. He is ill-appearing (chronically ill appearing). He is not diaphoretic.   HENT:      Head: Normocephalic and atraumatic.      Right Ear: External ear normal.      Left Ear: External ear normal.      Nose: Nose normal.   Eyes:       General:         Right eye: No discharge.         Left eye: No discharge.      Conjunctiva/sclera: Conjunctivae normal.   Pulmonary:      Effort: Pulmonary effort is normal. No respiratory distress.      Breath sounds: No stridor.   Musculoskeletal:      Comments: Legs wrapped   Skin:     General: Skin is dry.      Comments: Wounds of legs, back, buttocks   Neurological:      General: No focal deficit present.      Mental Status: He is alert and oriented sometimes gets agitated and hallucinate but has been redirectable   Mood has been irritable     Significant Labs: All pertinent labs within the past 24 hours have been reviewed.    Significant Imaging: I have reviewed all pertinent imaging results/findings within the past 24 hours.    Assessment/Plan:      * Osteomyelitis of left lower extremity  Patient with Proteus and enterobacter on leg cultures collected by Podiatry with concern for acute on chronic osteo. Fungal culture grew fusarium. He has started spiking fever and still is not agreeable to amputation, will do infectious work up to look out for source.    -UA noninfectious   -Blood culture NGTD  - Inpatient consult to ID and vascular surgery who plans to do AKA on 3/27.  - IV ertapenem with end date 4/15/24   - Added voriconazole for fusarium coverage which grew on fungal culture - 3 months  - midline catheters placed on admission for difficult IV access      Urinary retention  Patient developed new urinary retention.     -Sams's placed   -Flomax  -Renal ultrasound showed no hydronephrosis      BPH (benign prostatic hyperplasia)  Sams in placed, placed by urology  Flomax daily      Localized swelling of right upper extremity  Patient has swelling and pain in the right arm and some swelling in the left arm as well    -US b/l upper extremity for DVT showed clot, eliquis transitioned to Heparin while patient is pre-op     History of Bedbug bite with infection  Patient started on Permethrin daily for 5 days  "starting 3/12      Proteus infection    On ertapenem to cover proteus and enterobacter from left leg wound cx    Chronic anticoagulation  - See DVT      History of amputation of left high mid foot   See osteo      Stage 3b chronic kidney disease  Creatine stable for now. BMP reviewed- noted Estimated Creatinine Clearance: 58.4 mL/min (based on SCr of 1.4 mg/dL). according to latest data. Based on current GFR, CKD stage is stage 3 - GFR 30-59.  Monitor UOP and serial BMP and adjust therapy as needed. Renally dose meds. Avoid nephrotoxic medications and procedures.    Severe sepsis  This patient does have evidence of infective focus  My overall impression is sepsis.  Source: Skin and Soft Tissue (location left leg)  Antibiotics given-   Antibiotics (72h ago, onward)      Start     Stop Route Frequency Ordered    03/08/24 1230  ertapenem (INVANZ) 1 g in sodium chloride 0.9 % 100 mL IVPB (MB+)         -- IV Every 24 hours (non-standard times) 03/08/24 1115          Latest lactate reviewed-  No results for input(s): "LACTATE", "POCLAC" in the last 72 hours.    Organ dysfunction indicated by Acute kidney injury    Fluid challenge Actual Body weight- Patient will receive 30ml/kg actual body weight to calculate fluid bolus for treatment of septic shock.     Post- resuscitation assessment No - Post resuscitation assessment not needed       Will Not start Pressors-     - See osteo      Other hyperlipidemia  Continue home atorvastatin      Diabetic ketoacidosis without coma associated with type 2 diabetes mellitus  Patient's FSGs are uncontrolled due to hyperglycemia on current medication regimen.  Last A1c reviewed-   Lab Results   Component Value Date    HGBA1C >14.0 (H) 03/05/2024     Most recent fingerstick glucose reviewed-   Recent Labs   Lab 03/23/24  0811 03/23/24  1219 03/23/24  1619 03/23/24 2059   POCTGLUCOSE 123* 130* 141* 171*       Current correctional scale   LD SSI  Maintain anti-hyperglycemic dose as follows- "   Antihyperglycemics (From admission, onward)      Start     Stop Route Frequency Ordered    03/24/24 0715  insulin aspart U-100 pen 4-6 Units         -- SubQ with breakfast 03/23/24 0913    03/22/24 0900  insulin detemir U-100 (Levemir) pen 14 Units         -- SubQ 2 times daily 03/22/24 0616    03/20/24 1645  insulin aspart U-100 pen 2-4 Units         -- SubQ with dinner 03/20/24 0644    03/20/24 1130  insulin aspart U-100 pen 3-5 Units         -- SubQ with lunch 03/20/24 0644    03/11/24 1521  insulin aspart U-100 pen 0-5 Units         -- SubQ Before meals & nightly PRN 03/11/24 1520          Hold Oral hypoglycemics while patient is in the hospital.  Will keep patient on DKA pathway with insulin drip and fluid with protocol in place for switching to subcutaneous insulin as anion gap closes.      DKA  HHS  - Resolved  - Continue insulin regimen and BG checks TIDWM and QHS and 2am  - patient tends to become hypoglycemic around dinnertime  - could be insulin stacking from breakfast and lunch insulin aspart given poor PO intake despite assistance with feeding   - detemir 14 unit BID  - aspart scheduled w/ only breakfast and lunch and dinner with different units  -Inpatient consult to Endocrine    Paroxysmal atrial fibrillation  Patient with Persistent (7 days or more) atrial fibrillation which is uncontrolled currently with    . Patient is currently in sinus rhythm.[id number] Score: 4. . Anticoagulation indicated. Anticoagulation done with lovenox as we could not get IV access for heparin  .    -Eliquis transitioned to Heparin in preparation for AKA.    Chronic deep vein thrombosis (DVT) of right upper extremity  - eliquis transitioned to Heparin      Iron deficiency anemia  Patient's anemia is currently uncontrolled. Has received 2 units of PRBCs on 3/23 and 3/16 . Etiology likely d/t chronic blood loss and Iron deficiency  Current CBC reviewed-   Lab Results   Component Value Date    HGB 8.1 (L) 03/24/2024    HCT  26.5 (L) 03/24/2024     Monitor serial CBC and transfuse if patient becomes hemodynamically unstable, symptomatic or H/H drops below 7/21.    Cellulitis of left lower leg  See osteo      Peripheral arterial disease  Resume plavix      YNES (acute kidney injury)  Patient with acute kidney injury/acute renal failure likely due to pre-renal azotemia due to dehydration and post-obstructive d/t urinary retention  YNES is currently stable. Baseline creatinine  1.1  - Labs reviewed- Renal function/electrolytes with Estimated Creatinine Clearance: 58.4 mL/min (based on SCr of 1.4 mg/dL). according to latest data. Monitor urine output and serial BMP and adjust therapy as needed. Avoid nephrotoxins and renally dose meds for GFR listed above.    Creatinine 2.1 on admit, baseline around 1.1  - prerenal vs obstructive (fung placed)     Lab Results   Component Value Date    CREATININE 1.4 03/24/2024       Plan:   - Strict I&Os and daily weights   - Daily BMP  - Trend sCr  - Avoid nephrotoxic agents such as NSAIDs, ACEi, ARBs and IV radiocontrast.  - Renally dose meds to current GFR.  - Maintain MAP > 65.  - No indications for HD at this time.   - Maintain electrolytes at goal: Mg > 2, Phos > 3, K > 4.           Insulin dependent type 2 diabetes mellitus  Patient's FSGs are uncontrolled due to hyperglycemia on current medication regimen.  Last A1c reviewed-   Lab Results   Component Value Date    HGBA1C >14.0 (H) 03/05/2024     Most recent fingerstick glucose reviewed-   Recent Labs   Lab 03/23/24  0811 03/23/24  1219 03/23/24  1619 03/23/24  2059   POCTGLUCOSE 123* 130* 141* 171*       Current correctional scale  Low  Increase anti-hyperglycemic dose as follows-   Antihyperglycemics (From admission, onward)      Start     Stop Route Frequency Ordered    03/24/24 0715  insulin aspart U-100 pen 4-6 Units         -- SubQ with breakfast 03/23/24 0913    03/22/24 0900  insulin detemir U-100 (Levemir) pen 14 Units         -- SubQ 2  times daily 03/22/24 0616    03/20/24 1645  insulin aspart U-100 pen 2-4 Units         -- SubQ with dinner 03/20/24 0644    03/20/24 1130  insulin aspart U-100 pen 3-5 Units         -- SubQ with lunch 03/20/24 0644    03/11/24 1521  insulin aspart U-100 pen 0-5 Units         -- SubQ Before meals & nightly PRN 03/11/24 1520          Hold Oral hypoglycemics while patient is in the hospital.    - Endocrine's following an making insulin adjustments as needed.    Essential hypertension  Chronic, uncontrolled. Latest blood pressure and vitals reviewed-     Temp:  [98.5 °F (36.9 °C)-99.7 °F (37.6 °C)]   Pulse:  [76-99]   Resp:  [16-24]   BP: (105-152)/(58-82)   SpO2:  [89 %-97 %] .   Home meds for hypertension were reviewed and noted below.   Hypertension Medications               furosemide (LASIX) 40 MG tablet Take 20 mg by mouth.    hydrALAZINE (APRESOLINE) 100 MG tablet Take 1 tablet (100 mg total) by mouth every 8 (eight) hours.    isosorbide dinitrate (ISORDIL) 10 MG tablet Take 1 tablet (10 mg total) by mouth 3 (three) times daily.    NIFEdipine (PROCARDIA-XL) 60 MG (OSM) 24 hr tablet Take 1 tablet (60 mg total) by mouth once daily.            While in the hospital, will manage blood pressure as follows; Adjust home antihypertensive regimen as follows- will keep nifedipine - increased to 90     Will utilize p.r.n. blood pressure medication only if patient's blood pressure greater than 180/110 and he develops symptoms such as worsening chest pain or shortness of breath.      VTE Risk Mitigation (From admission, onward)           Ordered     heparin 25,000 units in dextrose 5% (100 units/ml) IV bolus from bag HIGH INTENSITY nomogram - [facility]  As needed (PRN)        Question:  Heparin Infusion Adjustment (DO NOT MODIFY ANSWER)  Answer:  [email or web address]\Images\Pharmacy\HeparinInfusions\heparin HIGH INTENSITY nomogram for [facility] [id number]    03/20/24 0744     heparin 25,000 units in dextrose 5% (100 units/ml) IV bolus from  bag HIGH INTENSITY nomogram - [facility]  As needed (PRN)        Question:  Heparin Infusion Adjustment (DO NOT MODIFY ANSWER)  Answer:  [email or web address]\Images\Pharmacy\HeparinInfusions\heparin HIGH INTENSITY nomogram for [facility] [id number]    03/20/24 0744     heparin 25,000 units in dextrose 5% 250 mL (100 units/mL) infusion HIGH INTENSITY nomogram - [facility]  Continuous        Question:  Begin at (units/kg/hr)  Answer:  18    03/20/24 0747                    Discharge Planning   OXANA: 3/29/2024     Code Status: Full Code   Is the patient medically ready for discharge?: No    Reason for patient still in hospital (select all that apply): Treatment  Discharge Plan A: Skilled Nursing Facility   Discharge Delays: None known at this time              Lynette Perkins MD  Department of Hospital Medicine   [facility] - Telemetry Stepdown

## 2024-03-24 NOTE — ASSESSMENT & PLAN NOTE
Endocrinology consulted for BG management.   BG goal 140-180    WBD 0.3 units/kg/day (Due to recurrent hypoglycemia taking a conservative approach to insulin dosing).   - Levemir (Insulin Detemir) 13 units BID (10% reduction due to fasting blood glucose below goal.)   - Novolog (Insulin Aspart) 4-6 units with breakfast, 3-5 lunch and 2-4 units with dinner and prn for BG excursions LDC SSI (150/50) (range added due to fluctuating appetite)  - BG checks AC/HS  - Hypoglycemia protocol in place  - If blood glucose greater than 300, please ask patient not to eat food or drink anything other than water until correctional insulin has brought it back below 250    ** Please notify Endocrine for any change and/or advance in diet**  ** Please call Endocrine for any BG related issues **    Discharge Planning:   TBD. Please notify endocrinology prior to discharge.  Plan Pending:  - Lantus 28 units nightly  - Novolog 5 units TIDWM  - Novolog SSI    150 - 200 + 2 unit    201 - 250 + 4 units    251 - 300 + 6 units    301 - 350 + 8 units       > 350   + 10 units  - Send logs in 3 days    Education:  Discharge Teaching:    Reviewed topics related to DM including: the need for insulin, how insulin works, what makes it a high risk medication, the importance of immediate follow up with either PCP or endocrine provider, importance of and how to check BG, how to record BG on logs, how to administer insulin, appropriate insulin administration sites, importance of rotating injection sites, hyper/hypoglycemia, how and when to treat hypoglycemia, when to hold insulin, how the correction scale works, importance of storing unused insulin in the refrigerator, and when to seek medical attention.  Patient verbalized understanding, answered all questions to patient's satisfaction. Blood sugar logs given to patient.     Hypoglycemia (Low Blood Sugar)  Too little glucose (sugar) in your blood is called hypoglycemia or low blood sugar. Diabetes itself  doesnt cause low blood sugar. But some of the treatments for diabetes, such as pills or insulin, may increase your risk for it. Low blood sugar may cause you to lose consciousness or have a seizure. So always treat low blood sugar right away.    Special note: Always carry a source of fast-acting sugar and a snack in case of hypoglycemia     What You May Notice  If you have low blood sugar, you may have these symptoms:  Shakiness or dizziness  Cold, clammy skin or sweating  Feelings of hunger  Headache  Nervousness  A hard, fast heartbeat  Weakness  Confusion or irritability  Blurred vision  What You Should Do  First, check your blood sugar. If it is too low (out of your target range), eat or drink 15 to 20 grams of fast-acting sugar. This may be 3 to 4 glucose tablets, 4 oz (half a cup) fruit juice or regular (non-diet) soda, 8 oz (one cup ) fat-free milk, or 1 tablespoon of honey. Dont take more than this, or your blood sugar may go too high.  Wait 15 minutes. Then recheck your blood sugar if you can.  If your blood sugar is still too low, repeat the steps above and check your blood sugar again. If your blood sugar still has not returned to your target range, contact your healthcare provider or seek emergency care.  Once your blood sugar returns to target range, eat a snack or meal.  Preventing Low Blood Sugar  Eat your meals and snacks at the same times each day. Dont skip meals!  Ask your healthcare provider if it is safe for you to drink alcohol. Never drink on an empty stomach.  Take your medication at the prescribed times.  Always carry a source of fast-acting sugar and a snack when youre away from home.  Other Things to Do  Carry a medical ID card or wear a medical alert bracelet or necklace. It should say that you have diabetes. It should also say what to do if you pass out or have a seizure.  Make sure your family, friends, and coworkers know the signs of low blood sugar. Tell them what to do if your  blood sugar falls very low and you cant treat yourself.  Keep a glucagon emergency kit handy. Be sure your family, friends, and coworkers know how and when to use it. Check it regularly and replace the glucagon before it expires.  Talk to your healthcare team about other things you can do to prevent low blood sugar.    If you experience hypoglycemia several times, call your doctor.   © 1081-8653 [employer], [address] [place] [address], [place] [address]. All rights reserved. This information is not intended as a substitute for professional medical care. Always follow your healthcare professional's instructions.

## 2024-03-24 NOTE — PROGRESS NOTES
[facility] - Telemetry Stepdown  Endocrinology  Progress Note    Admit Date: 3/5/2024     Reason for Consult: Management of T2DM, Hyperglycemia     Diabetes diagnosis year: > 25 years    Home Diabetes Medications:  Novolog 6 units w/ meals and Lantus 45 units nightly (states he is not taking).     How often checking glucose at home?  Not really checking    BG readings on regimen: 200's  Hypoglycemia on the regimen?  Yes (When he was taking the Lantus)  Missed doses on regimen?  Yes    Diabetes Complications include:     Hyperglycemia, Hypoglycemia , Diabetic chronic kidney disease     , Diabetic dermatitis, Foot ulcer  , and Periodontal disease    Complicating diabetes co morbidities:   HTN; HLD      HPI: Saji Castañeda is a 73 yo M with a history of Afib, T2D, HLD, HTN, chronic osteomyelitis of L heel, s/p L TMA, PID, ESBL Klebsiella and Acinetobacter bacteremia who was admitted after being found down at home and DKA. Endocrine consulted to manage hyperglycemia and type 2 diabetes.   Hemoglobin A1C   Date Value Ref Range Status   03/05/2024 >14.0 (H) 4.0 - 5.6 % Final     Comment:     ADA Screening Guidelines:  5.7-6.4%  Consistent with prediabetes  >or=6.5%  Consistent with diabetes    High levels of fetal hemoglobin interfere with the HbA1C  assay. Heterozygous hemoglobin variants (HbS, HgC, etc)do  not significantly interfere with this assay.   However, presence of multiple variants may affect accuracy.     09/13/2023 11.0 (H) 4.5 - 5.7 % Final   03/10/2023 8.4 (H) 4.0 - 5.6 % Final     Comment:     ADA Screening Guidelines:  5.7-6.4%  Consistent with prediabetes  >or=6.5%  Consistent with diabetes    High levels of fetal hemoglobin interfere with the HbA1C  assay. Heterozygous hemoglobin variants (HbS, HgC, etc)do  not significantly interfere with this assay.   However, presence of multiple variants may affect accuracy.     09/17/2022 9.9 (H) 4.0 - 5.6 % Final     Comment:     ADA Screening Guidelines:  5.7-6.4%   "Consistent with prediabetes  >or=6.5%  Consistent with diabetes    High levels of fetal hemoglobin interfere with the HbA1C  assay. Heterozygous hemoglobin variants (HbS, HgC, etc)do  not significantly interfere with this assay.   However, presence of multiple variants may affect accuracy.              Interval HPI:   Overnight events: No acute events overnight. Patient in room 8078/8078 A. Blood glucose stable. BG at and above goal on current insulin regimen (SSI, prandial, and basal insulin ). Steroid use- None.    Renal function- Creatinine 1.4  Vasopressors-  None       Endocrine will continue to follow and manage insulin orders inpatient.       Diet diabetic  Calorie  Diet NPO Except for: Medication     Eatin%  Nausea: No  Hypoglycemia and intervention: No  Fever: No  TPN and/or TF: No      BP (!) 129/58   Pulse 86   Temp 99.6 °F (37.6 °C) (Oral)   Resp 16   Ht 5' 9" (1.753 m)   Wt 120.2 kg (264 lb 15.9 oz)   SpO2 95%   BMI 39.13 kg/m²     Labs Reviewed and Include    Recent Labs   Lab 24  0506   GLU 77   CALCIUM 8.4*   ALBUMIN 1.8*   PROT 7.0      K 3.9   CO2 26      BUN 25*   CREATININE 1.4   ALKPHOS 79   ALT 9*   AST 26   BILITOT 0.3       Lab Results   Component Value Date    WBC 8.37 2024    HGB 8.1 (L) 2024    HCT 26.5 (L) 2024    MCV 82 2024     (H) 2024     No results for input(s): "TSH", "FREET4" in the last 168 hours.  Lab Results   Component Value Date    HGBA1C >14.0 (H) 2024       Nutritional status:   Body mass index is 39.13 kg/m².  Lab Results   Component Value Date    ALBUMIN 1.8 (L) 2024    ALBUMIN 1.9 (L) 2024    ALBUMIN 2.0 (L) 2024     Lab Results   Component Value Date    PREALBUMIN 17 (L) 2014    PREALBUMIN 16 (L) 2014    PREALBUMIN 19 (L) 2014       Estimated Creatinine Clearance: 58.4 mL/min (based on SCr of 1.4 mg/dL).    Accu-Checks  Recent Labs     24 " 03/22/24  0829 03/22/24  1216 03/22/24  1621 03/22/24  2017 03/22/24  2243 03/23/24  0811 03/23/24  1219 03/23/24  1619 03/23/24 2059   POCTGLUCOSE 143* 286* 312* 169* 238* 185* 123* 130* 141* 171*       Current Medications and/or Treatments Impacting Glycemic Control  Immunotherapy:    Immunosuppressants       None          Steroids:   Hormones (From admission, onward)      Start     Stop Route Frequency Ordered    03/05/24 1536  melatonin tablet 6 mg         -- Oral Nightly PRN 03/05/24 1437          Pressors:    Autonomic Drugs (From admission, onward)      None          Hyperglycemia/Diabetes Medications:   Antihyperglycemics (From admission, onward)      Start     Stop Route Frequency Ordered    03/24/24 0715  insulin aspart U-100 pen 4-6 Units         -- SubQ with breakfast 03/23/24 0913    03/22/24 0900  insulin detemir U-100 (Levemir) pen 14 Units         -- SubQ 2 times daily 03/22/24 0616    03/20/24 1645  insulin aspart U-100 pen 2-4 Units         -- SubQ with dinner 03/20/24 0644    03/20/24 1130  insulin aspart U-100 pen 3-5 Units         -- SubQ with lunch 03/20/24 0644    03/11/24 1521  insulin aspart U-100 pen 0-5 Units         -- SubQ Before meals & nightly PRN 03/11/24 1520            ASSESSMENT and PLAN    ID  * Osteomyelitis of left lower extremity  Optimize BG control to improve wound healing        Endocrine  Diabetic ketoacidosis without coma associated with type 2 diabetes mellitus  Resolved.         Insulin dependent type 2 diabetes mellitus  Endocrinology consulted for BG management.   BG goal 140-180    WBD 0.3 units/kg/day (Due to recurrent hypoglycemia taking a conservative approach to insulin dosing).   - Levemir (Insulin Detemir) 13 units BID (10% reduction due to fasting blood glucose below goal.)   - Novolog (Insulin Aspart) 4-6 units with breakfast, 3-5 lunch and 2-4 units with dinner and prn for BG excursions LDC SSI (150/50) (range added due to fluctuating appetite)  - BG checks  AC/HS  - Hypoglycemia protocol in place  - If blood glucose greater than 300, please ask patient not to eat food or drink anything other than water until correctional insulin has brought it back below 250    ** Please notify Endocrine for any change and/or advance in diet**  ** Please call Endocrine for any BG related issues **    Discharge Planning:   TBD. Please notify endocrinology prior to discharge.  Plan Pending:  - Lantus 28 units nightly  - Novolog 5 units TIDWM  - Novolog SSI    150 - 200 + 2 unit    201 - 250 + 4 units    251 - 300 + 6 units    301 - 350 + 8 units       > 350   + 10 units  - Send logs in 3 days    Education:  Discharge Teaching:    Reviewed topics related to DM including: the need for insulin, how insulin works, what makes it a high risk medication, the importance of immediate follow up with either PCP or endocrine provider, importance of and how to check BG, how to record BG on logs, how to administer insulin, appropriate insulin administration sites, importance of rotating injection sites, hyper/hypoglycemia, how and when to treat hypoglycemia, when to hold insulin, how the correction scale works, importance of storing unused insulin in the refrigerator, and when to seek medical attention.  Patient verbalized understanding, answered all questions to patient's satisfaction. Blood sugar logs given to patient.     Hypoglycemia (Low Blood Sugar)  Too little glucose (sugar) in your blood is called hypoglycemia or low blood sugar. Diabetes itself doesnt cause low blood sugar. But some of the treatments for diabetes, such as pills or insulin, may increase your risk for it. Low blood sugar may cause you to lose consciousness or have a seizure. So always treat low blood sugar right away.    Special note: Always carry a source of fast-acting sugar and a snack in case of hypoglycemia     What You May Notice  If you have low blood sugar, you may have these symptoms:  Shakiness or dizziness  Cold,  clammy skin or sweating  Feelings of hunger  Headache  Nervousness  A hard, fast heartbeat  Weakness  Confusion or irritability  Blurred vision  What You Should Do  First, check your blood sugar. If it is too low (out of your target range), eat or drink 15 to 20 grams of fast-acting sugar. This may be 3 to 4 glucose tablets, 4 oz (half a cup) fruit juice or regular (non-diet) soda, 8 oz (one cup ) fat-free milk, or 1 tablespoon of honey. Dont take more than this, or your blood sugar may go too high.  Wait 15 minutes. Then recheck your blood sugar if you can.  If your blood sugar is still too low, repeat the steps above and check your blood sugar again. If your blood sugar still has not returned to your target range, contact your healthcare provider or seek emergency care.  Once your blood sugar returns to target range, eat a snack or meal.  Preventing Low Blood Sugar  Eat your meals and snacks at the same times each day. Dont skip meals!  Ask your healthcare provider if it is safe for you to drink alcohol. Never drink on an empty stomach.  Take your medication at the prescribed times.  Always carry a source of fast-acting sugar and a snack when youre away from home.  Other Things to Do  Carry a medical ID card or wear a medical alert bracelet or necklace. It should say that you have diabetes. It should also say what to do if you pass out or have a seizure.  Make sure your family, friends, and coworkers know the signs of low blood sugar. Tell them what to do if your blood sugar falls very low and you cant treat yourself.  Keep a glucagon emergency kit handy. Be sure your family, friends, and coworkers know how and when to use it. Check it regularly and replace the glucagon before it expires.  Talk to your healthcare team about other things you can do to prevent low blood sugar.    If you experience hypoglycemia several times, call your doctor.   © 4741-0590 [employer], [address] [facility], [place]  [address]. All rights reserved. This information is not intended as a substitute for professional medical care. Always follow your healthcare professional's instructions.                  Chaka Dominguez DNP, FNP  Endocrinology  [facility] - Tele[facility] Stepdown

## 2024-03-24 NOTE — PLAN OF CARE
Problem: Adult Inpatient Plan of Care  Goal: Plan of Care Review  Outcome: Ongoing, Progressing     Problem: Adult Inpatient Plan of Care  Goal: Patient-Specific Goal (Individualized)  Outcome: Ongoing, Progressing     Problem: Adult Inpatient Plan of Care  Goal: Absence of Hospital-Acquired Illness or Injury  Outcome: Ongoing, Progressing     Problem: Adult Inpatient Plan of Care  Goal: Optimal Comfort and Wellbeing  Outcome: Ongoing, Progressing     Problem: Adult Inpatient Plan of Care  Goal: Readiness for Transition of Care  Outcome: Ongoing, Progressing     Problem: Diabetes Comorbidity  Goal: Blood Glucose Level Within Targeted Range  Outcome: Ongoing, Progressing     Problem: Adjustment to Illness (Sepsis/Septic Shock)  Goal: Optimal Coping  Outcome: Ongoing, Progressing     Problem: Nutrition Impaired (Sepsis/Septic Shock)  Goal: Optimal Nutrition Intake  Outcome: Ongoing, Progressing     Problem: Impaired Wound Healing  Goal: Optimal Wound Healing  Outcome: Ongoing, Progressing     Problem: Skin Injury Risk Increased  Goal: Skin Health and Integrity  Outcome: Ongoing, Progressing

## 2024-03-24 NOTE — ASSESSMENT & PLAN NOTE
Patient's FSGs are uncontrolled due to hyperglycemia on current medication regimen.  Last A1c reviewed-   Lab Results   Component Value Date    HGBA1C >14.0 (H) 03/05/2024     Most recent fingerstick glucose reviewed-   Recent Labs   Lab 03/23/24  0811 03/23/24  1219 03/23/24  1619 03/23/24 2059   POCTGLUCOSE 123* 130* 141* 171*       Current correctional scale  Low  Increase anti-hyperglycemic dose as follows-   Antihyperglycemics (From admission, onward)      Start     Stop Route Frequency Ordered    03/24/24 0715  insulin aspart U-100 pen 4-6 Units         -- SubQ with breakfast 03/23/24 0913    03/22/24 0900  insulin detemir U-100 (Levemir) pen 14 Units         -- SubQ 2 times daily 03/22/24 0616    03/20/24 1645  insulin aspart U-100 pen 2-4 Units         -- SubQ with dinner 03/20/24 0644    03/20/24 1130  insulin aspart U-100 pen 3-5 Units         -- SubQ with lunch 03/20/24 0644    03/11/24 1521  insulin aspart U-100 pen 0-5 Units         -- SubQ Before meals & nightly PRN 03/11/24 1520          Hold Oral hypoglycemics while patient is in the hospital.    - Endocrine's following an making insulin adjustments as needed.

## 2024-03-24 NOTE — NURSING
Called venipuncture x5 and phlebotomist to check for lab draw that's due 0333 APTT. No answer. Will try to call again

## 2024-03-24 NOTE — ASSESSMENT & PLAN NOTE
Patient with acute kidney injury/acute renal failure likely due to pre-renal azotemia due to dehydration and post-obstructive d/t urinary retention  YNES is currently stable. Baseline creatinine  1.1  - Labs reviewed- Renal function/electrolytes with Estimated Creatinine Clearance: 58.4 mL/min (based on SCr of 1.4 mg/dL). according to latest data. Monitor urine output and serial BMP and adjust therapy as needed. Avoid nephrotoxins and renally dose meds for GFR listed above.    Creatinine 2.1 on admit, baseline around 1.1  - prerenal vs obstructive (fung placed)     Lab Results   Component Value Date    CREATININE 1.4 03/24/2024       Plan:   - Strict I&Os and daily weights   - Daily BMP  - Trend sCr  - Avoid nephrotoxic agents such as NSAIDs, ACEi, ARBs and IV radiocontrast.  - Renally dose meds to current GFR.  - Maintain MAP > 65.  - No indications for HD at this time.   - Maintain electrolytes at goal: Mg > 2, Phos > 3, K > 4.

## 2024-03-24 NOTE — ASSESSMENT & PLAN NOTE
Chronic, uncontrolled. Latest blood pressure and vitals reviewed-     Temp:  [98.5 °F (36.9 °C)-99.7 °F (37.6 °C)]   Pulse:  [76-99]   Resp:  [16-24]   BP: (105-152)/(58-82)   SpO2:  [89 %-97 %] .   Home meds for hypertension were reviewed and noted below.   Hypertension Medications               furosemide (LASIX) 40 MG tablet Take 20 mg by mouth.    hydrALAZINE (APRESOLINE) 100 MG tablet Take 1 tablet (100 mg total) by mouth every 8 (eight) hours.    isosorbide dinitrate (ISORDIL) 10 MG tablet Take 1 tablet (10 mg total) by mouth 3 (three) times daily.    NIFEdipine (PROCARDIA-XL) 60 MG (OSM) 24 hr tablet Take 1 tablet (60 mg total) by mouth once daily.            While in the hospital, will manage blood pressure as follows; Adjust home antihypertensive regimen as follows- will keep nifedipine - increased to 90     Will utilize p.r.n. blood pressure medication only if patient's blood pressure greater than 180/110 and he develops symptoms such as worsening chest pain or shortness of breath.

## 2024-03-25 LAB
ALBUMIN SERPL BCP-MCNC: 1.8 G/DL (ref 3.5–5.2)
ALP SERPL-CCNC: 81 U/L (ref 55–135)
ALT SERPL W/O P-5'-P-CCNC: 9 U/L (ref 10–44)
ANION GAP SERPL CALC-SCNC: 12 MMOL/L (ref 8–16)
APTT PPP: 43.7 SEC (ref 21–32)
AST SERPL-CCNC: 31 U/L (ref 10–40)
BASOPHILS # BLD AUTO: 0.03 K/UL (ref 0–0.2)
BASOPHILS NFR BLD: 0.4 % (ref 0–1.9)
BILIRUB SERPL-MCNC: 0.2 MG/DL (ref 0.1–1)
BUN SERPL-MCNC: 25 MG/DL (ref 8–23)
CALCIUM SERPL-MCNC: 8 MG/DL (ref 8.7–10.5)
CHLORIDE SERPL-SCNC: 108 MMOL/L (ref 95–110)
CO2 SERPL-SCNC: 20 MMOL/L (ref 23–29)
CREAT SERPL-MCNC: 1.3 MG/DL (ref 0.5–1.4)
DIFFERENTIAL METHOD BLD: ABNORMAL
EOSINOPHIL # BLD AUTO: 0.2 K/UL (ref 0–0.5)
EOSINOPHIL NFR BLD: 2 % (ref 0–8)
ERYTHROCYTE [DISTWIDTH] IN BLOOD BY AUTOMATED COUNT: 16.2 % (ref 11.5–14.5)
EST. GFR  (NO RACE VARIABLE): 57.3 ML/MIN/1.73 M^2
GLUCOSE SERPL-MCNC: 85 MG/DL (ref 70–110)
HCT VFR BLD AUTO: 28.5 % (ref 40–54)
HGB BLD-MCNC: 8.3 G/DL (ref 14–18)
IMM GRANULOCYTES # BLD AUTO: 0.02 K/UL (ref 0–0.04)
IMM GRANULOCYTES NFR BLD AUTO: 0.2 % (ref 0–0.5)
LYMPHOCYTES # BLD AUTO: 1.4 K/UL (ref 1–4.8)
LYMPHOCYTES NFR BLD: 17 % (ref 18–48)
MCH RBC QN AUTO: 24.8 PG (ref 27–31)
MCHC RBC AUTO-ENTMCNC: 29.1 G/DL (ref 32–36)
MCV RBC AUTO: 85 FL (ref 82–98)
MONOCYTES # BLD AUTO: 0.9 K/UL (ref 0.3–1)
MONOCYTES NFR BLD: 11.5 % (ref 4–15)
NEUTROPHILS # BLD AUTO: 5.6 K/UL (ref 1.8–7.7)
NEUTROPHILS NFR BLD: 68.9 % (ref 38–73)
NRBC BLD-RTO: 0 /100 WBC
PLATELET # BLD AUTO: 438 K/UL (ref 150–450)
PMV BLD AUTO: 9.3 FL (ref 9.2–12.9)
POCT GLUCOSE: 114 MG/DL (ref 70–110)
POCT GLUCOSE: 133 MG/DL (ref 70–110)
POCT GLUCOSE: 196 MG/DL (ref 70–110)
POCT GLUCOSE: 288 MG/DL (ref 70–110)
POTASSIUM SERPL-SCNC: 4.4 MMOL/L (ref 3.5–5.1)
PROT SERPL-MCNC: 6.4 G/DL (ref 6–8.4)
RBC # BLD AUTO: 3.35 M/UL (ref 4.6–6.2)
SODIUM SERPL-SCNC: 140 MMOL/L (ref 136–145)
WBC # BLD AUTO: 8.14 K/UL (ref 3.9–12.7)

## 2024-03-25 PROCEDURE — 80285 DRUG ASSAY VORICONAZOLE: CPT

## 2024-03-25 PROCEDURE — 25000003 PHARM REV CODE 250

## 2024-03-25 PROCEDURE — 99232 SBSQ HOSP IP/OBS MODERATE 35: CPT | Mod: ,,, | Performed by: NURSE PRACTITIONER

## 2024-03-25 PROCEDURE — 85730 THROMBOPLASTIN TIME PARTIAL: CPT | Performed by: FAMILY MEDICINE

## 2024-03-25 PROCEDURE — 85025 COMPLETE CBC W/AUTO DIFF WBC: CPT

## 2024-03-25 PROCEDURE — 27000207 HC ISOLATION

## 2024-03-25 PROCEDURE — 25000003 PHARM REV CODE 250: Performed by: PHYSICIAN ASSISTANT

## 2024-03-25 PROCEDURE — 80053 COMPREHEN METABOLIC PANEL: CPT

## 2024-03-25 PROCEDURE — 36415 COLL VENOUS BLD VENIPUNCTURE: CPT | Performed by: FAMILY MEDICINE

## 2024-03-25 PROCEDURE — 20600001 HC STEP DOWN PRIVATE ROOM

## 2024-03-25 PROCEDURE — 36415 COLL VENOUS BLD VENIPUNCTURE: CPT | Mod: XB

## 2024-03-25 PROCEDURE — 63600175 PHARM REV CODE 636 W HCPCS

## 2024-03-25 PROCEDURE — 97530 THERAPEUTIC ACTIVITIES: CPT

## 2024-03-25 PROCEDURE — 63600175 PHARM REV CODE 636 W HCPCS: Performed by: PHYSICIAN ASSISTANT

## 2024-03-25 PROCEDURE — 97535 SELF CARE MNGMENT TRAINING: CPT

## 2024-03-25 PROCEDURE — 25000003 PHARM REV CODE 250: Performed by: INTERNAL MEDICINE

## 2024-03-25 PROCEDURE — 97110 THERAPEUTIC EXERCISES: CPT

## 2024-03-25 PROCEDURE — A4216 STERILE WATER/SALINE, 10 ML: HCPCS

## 2024-03-25 RX ADMIN — Medication 10 ML: at 05:03

## 2024-03-25 RX ADMIN — ERTAPENEM 1 G: 1 INJECTION INTRAMUSCULAR; INTRAVENOUS at 01:03

## 2024-03-25 RX ADMIN — INSULIN ASPART 4 UNITS: 100 INJECTION, SOLUTION INTRAVENOUS; SUBCUTANEOUS at 05:03

## 2024-03-25 RX ADMIN — NIFEDIPINE 90 MG: 30 TABLET, FILM COATED, EXTENDED RELEASE ORAL at 09:03

## 2024-03-25 RX ADMIN — POLYETHYLENE GLYCOL 3350 17 G: 17 POWDER, FOR SOLUTION ORAL at 09:03

## 2024-03-25 RX ADMIN — VORICONAZOLE 200 MG: 200 TABLET, FILM COATED ORAL at 09:03

## 2024-03-25 RX ADMIN — INSULIN DETEMIR 13 UNITS: 100 INJECTION, SOLUTION SUBCUTANEOUS at 09:03

## 2024-03-25 RX ADMIN — CLOPIDOGREL BISULFATE 75 MG: 75 TABLET ORAL at 09:03

## 2024-03-25 RX ADMIN — Medication 10 ML: at 12:03

## 2024-03-25 RX ADMIN — SENNOSIDES 8.6 MG: 8.6 TABLET, FILM COATED ORAL at 09:03

## 2024-03-25 RX ADMIN — GABAPENTIN 300 MG: 300 CAPSULE ORAL at 09:03

## 2024-03-25 RX ADMIN — TAMSULOSIN HYDROCHLORIDE 0.4 MG: 0.4 CAPSULE ORAL at 09:03

## 2024-03-25 RX ADMIN — ATORVASTATIN CALCIUM 40 MG: 40 TABLET, FILM COATED ORAL at 09:03

## 2024-03-25 RX ADMIN — INSULIN ASPART 6 UNITS: 100 INJECTION, SOLUTION INTRAVENOUS; SUBCUTANEOUS at 09:03

## 2024-03-25 RX ADMIN — Medication 10 ML: at 06:03

## 2024-03-25 RX ADMIN — HEPARIN SODIUM AND DEXTROSE 14 UNITS/KG/HR: 10000; 5 INJECTION INTRAVENOUS at 11:03

## 2024-03-25 RX ADMIN — COLLAGENASE SANTYL: 250 OINTMENT TOPICAL at 09:03

## 2024-03-25 RX ADMIN — PANTOPRAZOLE SODIUM 40 MG: 40 TABLET, DELAYED RELEASE ORAL at 09:03

## 2024-03-25 NOTE — ASSESSMENT & PLAN NOTE
Patient with Persistent (7 days or more) atrial fibrillation which is uncontrolled currently with    . Patient is currently in sinus rhythm.[id number] Score: 4. . Anticoagulation indicated. Anticoagulation done with lovenox as we could not get IV access for heparin  .    -Eliquis transitioned to Heparin in preparation for AKA.

## 2024-03-25 NOTE — ASSESSMENT & PLAN NOTE
Patient's anemia is currently uncontrolled. Has received 2 units of PRBCs on 3/23 and 3/16 . Etiology likely d/t chronic blood loss and Iron deficiency  Current CBC reviewed-   Lab Results   Component Value Date    HGB 8.3 (L) 03/25/2024    HCT 28.5 (L) 03/25/2024     Monitor serial CBC and transfuse if patient becomes hemodynamically unstable, symptomatic or H/H drops below 7/21.

## 2024-03-25 NOTE — ASSESSMENT & PLAN NOTE
Patient developed new urinary retention.     -Fung's placed   -Flomax  -Renal ultrasound showed no hydronephrosis  - Making adequate urine with fung, c/f hematuria which developed 3/25 and resolved with repositioning of fung. See urology note from same date

## 2024-03-25 NOTE — PLAN OF CARE
[facility] - Tele[facility] Step[facility]  Discharge Reassessment    Primary Care Provider: [facility] -    Expected Discharge Date: 3/29/2024    Patient is not medically ready for discharge at this time.  AKA scheduled for 3/27/2024.    Reassessment (most recent)       Discharge Reassessment - 03/25/24 1804          Discharge Reassessment    Assessment Type Discharge Planning Reassessment     Did the patient's condition or plan change since previous assessment? No     Communicated OXANA with patient/caregiver Date not available/Unable to determine     Discharge Plan A Skilled Nursing Facility     Discharge Plan B New Nursing Home placement - group home care facility     DME Needed Upon Discharge  none     Transition of Care Barriers Does not adhere to care plan;Mobility;Transportation;Social     Why the patient remains in the hospital Requires continued medical care        Post-Acute Status    Post-Acute Authorization Placement     Post-Acute Placement Status Referrals Sent

## 2024-03-25 NOTE — PROGRESS NOTES
[facility] - Telemetry Stepdown  Endocrinology  Progress Note    Admit Date: 3/5/2024     Reason for Consult: Management of T2DM, Hyperglycemia     Diabetes diagnosis year: > 25 years    Home Diabetes Medications:  Novolog 6 units w/ meals and Lantus 45 units nightly (states he is not taking).     How often checking glucose at home?  Not really checking    BG readings on regimen: 200's  Hypoglycemia on the regimen?  Yes (When he was taking the Lantus)  Missed doses on regimen?  Yes    Diabetes Complications include:     Hyperglycemia, Hypoglycemia , Diabetic chronic kidney disease     , Diabetic dermatitis, Foot ulcer  , and Periodontal disease    Complicating diabetes co morbidities:   HTN; HLD      HPI: Saji Castañeda is a 75 yo M with a history of Afib, T2D, HLD, HTN, chronic osteomyelitis of L heel, s/p L TMA, PID, ESBL Klebsiella and Acinetobacter bacteremia who was admitted after being found down at home and DKA. Endocrine consulted to manage hyperglycemia and type 2 diabetes.   Hemoglobin A1C   Date Value Ref Range Status   03/05/2024 >14.0 (H) 4.0 - 5.6 % Final     Comment:     ADA Screening Guidelines:  5.7-6.4%  Consistent with prediabetes  >or=6.5%  Consistent with diabetes    High levels of fetal hemoglobin interfere with the HbA1C  assay. Heterozygous hemoglobin variants (HbS, HgC, etc)do  not significantly interfere with this assay.   However, presence of multiple variants may affect accuracy.     09/13/2023 11.0 (H) 4.5 - 5.7 % Final   03/10/2023 8.4 (H) 4.0 - 5.6 % Final     Comment:     ADA Screening Guidelines:  5.7-6.4%  Consistent with prediabetes  >or=6.5%  Consistent with diabetes    High levels of fetal hemoglobin interfere with the HbA1C  assay. Heterozygous hemoglobin variants (HbS, HgC, etc)do  not significantly interfere with this assay.   However, presence of multiple variants may affect accuracy.     09/17/2022 9.9 (H) 4.0 - 5.6 % Final     Comment:     ADA Screening Guidelines:  5.7-6.4%   "Consistent with prediabetes  >or=6.5%  Consistent with diabetes    High levels of fetal hemoglobin interfere with the HbA1C  assay. Heterozygous hemoglobin variants (HbS, HgC, etc)do  not significantly interfere with this assay.   However, presence of multiple variants may affect accuracy.              Interval HPI:   Overnight events: No acute events overnight. Patient in room 8078/8078 A. Blood glucose stable. BG at and above goal on current insulin regimen (SSI, prandial, and basal insulin ). Steroid use- None.    Renal function- Creatinine 1.3  Vasopressors-  None       Endocrine will continue to follow and manage insulin orders inpatient.       Diet diabetic  Calorie  Diet NPO Except for: Medication     Eatin%  Nausea: No  Hypoglycemia and intervention: No  Fever: No  TPN and/or TF: No      /70   Pulse 84   Temp 98.7 °F (37.1 °C) (Oral)   Resp 17   Ht 5' 9" (1.753 m)   Wt 120.2 kg (264 lb 15.9 oz)   SpO2 100%   BMI 39.13 kg/m²     Labs Reviewed and Include    Recent Labs   Lab 24  0522   GLU 85   CALCIUM 8.0*   ALBUMIN 1.8*   PROT 6.4      K 4.4   CO2 20*      BUN 25*   CREATININE 1.3   ALKPHOS 81   ALT 9*   AST 31   BILITOT 0.2       Lab Results   Component Value Date    WBC 8.14 2024    HGB 8.3 (L) 2024    HCT 28.5 (L) 2024    MCV 85 2024     2024     No results for input(s): "TSH", "FREET4" in the last 168 hours.  Lab Results   Component Value Date    HGBA1C >14.0 (H) 2024       Nutritional status:   Body mass index is 39.13 kg/m².  Lab Results   Component Value Date    ALBUMIN 1.8 (L) 2024    ALBUMIN 1.8 (L) 2024    ALBUMIN 1.9 (L) 2024     Lab Results   Component Value Date    PREALBUMIN 17 (L) 2014    PREALBUMIN 16 (L) 2014    PREALBUMIN 19 (L) 2014       Estimated Creatinine Clearance: 62.8 mL/min (based on SCr of 1.3 mg/dL).    Accu-Checks  Recent Labs     24  1216 " 03/22/24  1621 03/22/24  2017 03/22/24  2243 03/23/24  0811 03/23/24  1219 03/23/24  1619 03/23/24  2059 03/24/24  0921 03/24/24  2200   POCTGLUCOSE 312* 169* 238* 185* 123* 130* 141* 171* 83 117*       Current Medications and/or Treatments Impacting Glycemic Control  Immunotherapy:    Immunosuppressants       None          Steroids:   Hormones (From admission, onward)      Start     Stop Route Frequency Ordered    03/05/24 1536  melatonin tablet 6 mg         -- Oral Nightly PRN 03/05/24 1437          Pressors:    Autonomic Drugs (From admission, onward)      None          Hyperglycemia/Diabetes Medications:   Antihyperglycemics (From admission, onward)      Start     Stop Route Frequency Ordered    03/24/24 2100  insulin detemir U-100 (Levemir) pen 13 Units         -- SubQ 2 times daily 03/24/24 0929    03/24/24 0715  insulin aspart U-100 pen 4-6 Units         -- SubQ with breakfast 03/23/24 0913    03/20/24 1645  insulin aspart U-100 pen 2-4 Units         -- SubQ with dinner 03/20/24 0644    03/20/24 1130  insulin aspart U-100 pen 3-5 Units         -- SubQ with lunch 03/20/24 0644    03/11/24 1521  insulin aspart U-100 pen 0-5 Units         -- SubQ Before meals & nightly PRN 03/11/24 1520              ASSESSMENT and PLAN    ID  * Osteomyelitis of left lower extremity  Optimize BG control to improve wound healing        Endocrine  Diabetic ketoacidosis without coma associated with type 2 diabetes mellitus  Resolved.         Insulin dependent type 2 diabetes mellitus  Endocrinology consulted for BG management.   BG goal 140-180    Poorer appetite noted over the past 24 hours.  - Levemir (Insulin Detemir) 13 units BID   - Novolog (Insulin Aspart) 4-6 units with breakfast, 3-5 lunch and 2-4 units with dinner and prn for BG excursions [facility] SSI (150/50) (range added due to fluctuating appetite)  - BG checks AC/HS  - Hypoglycemia protocol in place  - If blood glucose greater than 300, please ask patient not to eat food or  drink anything other than water until correctional insulin has brought it back below 250    ** Please notify Endocrine for any change and/or advance in diet**  ** Please call Endocrine for any BG related issues **    Discharge Planning:   TBD. Please notify endocrinology prior to discharge.  Plan Pending:  - Lantus 28 units nightly  - Novolog 5 units TIDWM  - Novolog SSI    150 - 200 + 2 unit    201 - 250 + 4 units    251 - 300 + 6 units    301 - 350 + 8 units       > 350   + 10 units  - Send logs in 3 days    Education:  Discharge Teaching:    Reviewed topics related to DM including: the need for insulin, how insulin works, what makes it a high risk medication, the importance of immediate follow up with either PCP or endocrine provider, importance of and how to check BG, how to record BG on logs, how to administer insulin, appropriate insulin administration sites, importance of rotating injection sites, hyper/hypoglycemia, how and when to treat hypoglycemia, when to hold insulin, how the correction scale works, importance of storing unused insulin in the refrigerator, and when to seek medical attention.  Patient verbalized understanding, answered all questions to patient's satisfaction. Blood sugar logs given to patient.     Hypoglycemia (Low Blood Sugar)  Too little glucose (sugar) in your blood is called hypoglycemia or low blood sugar. Diabetes itself doesnt cause low blood sugar. But some of the treatments for diabetes, such as pills or insulin, may increase your risk for it. Low blood sugar may cause you to lose consciousness or have a seizure. So always treat low blood sugar right away.    Special note: Always carry a source of fast-acting sugar and a snack in case of hypoglycemia     What You May Notice  If you have low blood sugar, you may have these symptoms:  Shakiness or dizziness  Cold, clammy skin or sweating  Feelings of hunger  Headache  Nervousness  A hard, fast heartbeat  Weakness  Confusion or  irritability  Blurred vision  What You Should Do  First, check your blood sugar. If it is too low (out of your target range), eat or drink 15 to 20 grams of fast-acting sugar. This may be 3 to 4 glucose tablets, 4 oz (half a cup) fruit juice or regular (non-diet) soda, 8 oz (one cup ) fat-free milk, or 1 tablespoon of honey. Dont take more than this, or your blood sugar may go too high.  Wait 15 minutes. Then recheck your blood sugar if you can.  If your blood sugar is still too low, repeat the steps above and check your blood sugar again. If your blood sugar still has not returned to your target range, contact your healthcare provider or seek emergency care.  Once your blood sugar returns to target range, eat a snack or meal.  Preventing Low Blood Sugar  Eat your meals and snacks at the same times each day. Dont skip meals!  Ask your healthcare provider if it is safe for you to drink alcohol. Never drink on an empty stomach.  Take your medication at the prescribed times.  Always carry a source of fast-acting sugar and a snack when youre away from home.  Other Things to Do  Carry a medical ID card or wear a medical alert bracelet or necklace. It should say that you have diabetes. It should also say what to do if you pass out or have a seizure.  Make sure your family, friends, and coworkers know the signs of low blood sugar. Tell them what to do if your blood sugar falls very low and you cant treat yourself.  Keep a glucagon emergency kit handy. Be sure your family, friends, and coworkers know how and when to use it. Check it regularly and replace the glucagon before it expires.  Talk to your healthcare team about other things you can do to prevent low blood sugar.    If you experience hypoglycemia several times, call your doctor.   © 5008-3804 [employer], [address] [facility], [place] [address]. All rights reserved. This information is not intended as a substitute for professional medical care. Always  follow your healthcare professional's instructions.                  Chaka Dominguez DNP, FNP  Endocrinology  [facility] - Tele[facility] Step[facility]

## 2024-03-25 NOTE — ASSESSMENT & PLAN NOTE
Chronic, uncontrolled. Latest blood pressure and vitals reviewed-     Temp:  [98.3 °F (36.8 °C)-99.5 °F (37.5 °C)]   Pulse:  [84-94]   Resp:  [17-19]   BP: (115-160)/(53-70)   SpO2:  [86 %-100 %] .   Home meds for hypertension were reviewed and noted below.   Hypertension Medications               furosemide (LASIX) 40 MG tablet Take 20 mg by mouth.    hydrALAZINE (APRESOLINE) 100 MG tablet Take 1 tablet (100 mg total) by mouth every 8 (eight) hours.    isosorbide dinitrate (ISORDIL) 10 MG tablet Take 1 tablet (10 mg total) by mouth 3 (three) times daily.    NIFEdipine (PROCARDIA-XL) 60 MG (OSM) 24 hr tablet Take 1 tablet (60 mg total) by mouth once daily.            While in the hospital, will manage blood pressure as follows; Adjust home antihypertensive regimen as follows- will keep nifedipine - increased to 90     Will utilize p.r.n. blood pressure medication only if patient's blood pressure greater than 180/110 and he develops symptoms such as worsening chest pain or shortness of breath.

## 2024-03-25 NOTE — ASSESSMENT & PLAN NOTE
Patient with acute kidney injury/acute renal failure likely due to pre-renal azotemia due to dehydration and post-obstructive d/t urinary retention  YNES is currently stable. Baseline creatinine  1.1  - Labs reviewed- Renal function/electrolytes with Estimated Creatinine Clearance: 62.8 mL/min (based on SCr of 1.3 mg/dL). according to latest data. Monitor urine output and serial BMP and adjust therapy as needed. Avoid nephrotoxins and renally dose meds for GFR listed above.    Creatinine 2.1 on admit, baseline around 1.1  - prerenal vs obstructive (fung placed)     Lab Results   Component Value Date    CREATININE 1.3 03/25/2024       Plan:   - Strict I&Os and daily weights   - Daily BMP  - Trend sCr  - Avoid nephrotoxic agents such as NSAIDs, ACEi, ARBs and IV radiocontrast.  - Renally dose meds to current GFR.  - Maintain MAP > 65.  - No indications for HD at this time.   - Maintain electrolytes at goal: Mg > 2, Phos > 3, K > 4.

## 2024-03-25 NOTE — PT/OT/SLP PROGRESS
Occupational Therapy   Co-Treatment  co-treatment performed this date due to need for 2 skilled therapists in order to ensure pt safety, accomodate for pt activity tolerance, and maximize functional potential      Name: Saji Castañeda  MRN: [id number]  Admitting Diagnosis:  Osteomyelitis of left lower extremity       Recommendations:     Discharge Recommendations: Moderate Intensity Therapy  Discharge Equipment Recommendations:  hospital bed, lift device  Barriers to discharge:   (increased skilled (A) required)    Assessment:     Saji Castañeda is a 74 y.o. male with a medical diagnosis of Osteomyelitis of left lower extremity.  He presents with performance deficits affecting function are weakness, impaired balance, pain, impaired skin, decreased safety awareness, impaired endurance, impaired self care skills, impaired functional mobility, gait instability, decreased lower extremity function, decreased upper extremity function. RN performing wound care and toileting upon therapists arrival. Session focused on bed mobility to assist pt and RN with toileting and bowel management system placement. Pt with severe pain during session, encouraged deep breathing throughout. Pt required significant assistance for mobility and ADLs this date. Pt will continue to benefit from skilled OT services to address impairments listed above to maximize independence with ADLs and functional mobility to ensure safe return to PLOF.     Rehab Prognosis:  Fair; patient would benefit from acute skilled OT services to address these deficits and reach maximum level of function.       Plan:     Patient to be seen 2 x/week to address the above listed problems via self-care/home management, therapeutic activities, therapeutic exercises, neuromuscular re-education  Plan of Care Expires: 04/06/24  Plan of Care Reviewed with: patient    Subjective     Chief Complaint: pain   Patient/Family Comments/goals: get better  Pain/Comfort:  Pain Rating 1:   (unrated)  Location - Side 1: Left  Location - Orientation 1: generalized  Location 1: leg  Pain Addressed 1: Reposition, Distraction  Pain Rating Post-Intervention 1:  (unrated)  Pain Rating 2: 10/10  Location - Side 2: Bilateral  Location - Orientation 2: lower  Location 2: gluteal  Pain Addressed 2: Reposition, Distraction  Pain Rating Post-Intervention 2:  (unrated)    Objective:     Communicated with: RN prior to session.  Patient found right sidelying with pulse ox (continuous), fung catheter, telemetry upon OT entry to room.    General Precautions: Standard, fall, contact, diabetic    Orthopedic Precautions:LLE non weight bearing, RLE weight bearing as tolerated  Braces:  (R darco shoe OOB)  Respiratory Status: Room air     Occupational Performance:     Bed Mobility:    Patient completed Rolling/Turning to Left with  maximal assistance and 2 persons  Patient completed Rolling/Turning to Right with maximal assistance and 2 persons   Able to maintain sidelying during posterior natalie care with Mod A and use of bed rail     Functional Mobility  Functional Mobility: pt participated in rolling L and R, educated on current severity of sacral wound and importance of pressure relief on buttocks to improve self care    Activities of Daily Living:  Upper Body Dressing: maximal assistance doffing gown  Toileting: total assistance and of 2 persons toileting bed level. X1 assist for rolling and  legs, x1 assist for cleaning natalie area 2/2 ongoing diarrhea.       [facility] 6 Click ADL: 12    Treatment & Education:  Pt educated on   Role of OT and OT POC  Utilizing the call bell to request for assistance with all functional mobility to ensure safety during hospital stay.    Pt verbalized understanding and all questions were addressed within the scope of OT.     Patient left left sidelying with  RN and PCTs present    GOALS:   Multidisciplinary Problems       Occupational Therapy Goals          Problem: Occupational  Therapy    Goal Priority Disciplines Outcome Interventions   Occupational Therapy Goal     OT, PT/OT Ongoing, Progressing    Description: Goals to be met by: 4/6/24     Patient will increase functional independence with ADLs by performing:    UE Dressing with Contact Guard Assistance.  LE Dressing with Maximum Assistance.  Grooming while EOB with Stand-by Assistance.  Toileting from bedside commode with Maximum Assistance for hygiene and clothing management.   Sitting at edge of bed x5 minutes with Stand-by Assistance.  Rolling to Bilateral with Minimal Assistance.   Supine to sit with Minimal Assistance.  Stand pivot transfers with Minimal Assistance.  Toilet transfer to bedside commode with Maximum Assistance.  BUE strengthening exercise program 2x day with theraband/HEP worksheet                         Time Tracking:     OT Date of Treatment: 03/25/24  OT Start Time: 1109  OT Stop Time: 1153  OT Total Time (min): 44 min    Billable Minutes:Self Care/Home Management 44    OT/KATHLEEN: OT     Number of KATHLEEN visits since last OT visit: 3    3/25/2024

## 2024-03-25 NOTE — ASSESSMENT & PLAN NOTE
Patient's FSGs are uncontrolled due to hyperglycemia on current medication regimen.  Last A1c reviewed-   Lab Results   Component Value Date    HGBA1C >14.0 (H) 03/05/2024     Most recent fingerstick glucose reviewed-   Recent Labs   Lab 03/24/24  2200 03/25/24  0809 03/25/24  1226   POCTGLUCOSE 117* 114* 133*       Current correctional scale  Low  Increase anti-hyperglycemic dose as follows-   Antihyperglycemics (From admission, onward)    Start     Stop Route Frequency Ordered    03/24/24 2100  insulin detemir U-100 (Levemir) pen 13 Units         -- SubQ 2 times daily 03/24/24 0929    03/24/24 0715  insulin aspart U-100 pen 4-6 Units         -- SubQ with breakfast 03/23/24 0913    03/20/24 1645  insulin aspart U-100 pen 2-4 Units         -- SubQ with dinner 03/20/24 0644    03/20/24 1130  insulin aspart U-100 pen 3-5 Units         -- SubQ with lunch 03/20/24 0644    03/11/24 1521  insulin aspart U-100 pen 0-5 Units         -- SubQ Before meals & nightly PRN 03/11/24 1520        Hold Oral hypoglycemics while patient is in the hospital.    - Endocrine's following an making insulin adjustments as needed.

## 2024-03-25 NOTE — ASSESSMENT & PLAN NOTE
Endocrinology consulted for BG management.   BG goal 140-180    Poorer appetite noted over the past 24 hours.  - Levemir (Insulin Detemir) 13 units BID   - Novolog (Insulin Aspart) 4-6 units with breakfast, 3-5 lunch and 2-4 units with dinner and prn for BG excursions [facility] SSI (150/50) (range added due to fluctuating appetite)  - BG checks AC/HS  - Hypoglycemia protocol in place  - If blood glucose greater than 300, please ask patient not to eat food or drink anything other than water until correctional insulin has brought it back below 250    ** Please notify Endocrine for any change and/or advance in diet**  ** Please call Endocrine for any BG related issues **    Discharge Planning:   TBD. Please notify endocrinology prior to discharge.  Plan Pending:  - Lantus 28 units nightly  - Novolog 5 units TIDWM  - Novolog SSI    150 - 200 + 2 unit    201 - 250 + 4 units    251 - 300 + 6 units    301 - 350 + 8 units       > 350   + 10 units  - Send logs in 3 days    Education:  Discharge Teaching:    Reviewed topics related to DM including: the need for insulin, how insulin works, what makes it a high risk medication, the importance of immediate follow up with either PCP or endocrine provider, importance of and how to check BG, how to record BG on logs, how to administer insulin, appropriate insulin administration sites, importance of rotating injection sites, hyper/hypoglycemia, how and when to treat hypoglycemia, when to hold insulin, how the correction scale works, importance of storing unused insulin in the refrigerator, and when to seek medical attention.  Patient verbalized understanding, answered all questions to patient's satisfaction. Blood sugar logs given to patient.     Hypoglycemia (Low Blood Sugar)  Too little glucose (sugar) in your blood is called hypoglycemia or low blood sugar. Diabetes itself doesnt cause low blood sugar. But some of the treatments for diabetes, such as pills or insulin, may increase  your risk for it. Low blood sugar may cause you to lose consciousness or have a seizure. So always treat low blood sugar right away.    Special note: Always carry a source of fast-acting sugar and a snack in case of hypoglycemia     What You May Notice  If you have low blood sugar, you may have these symptoms:  Shakiness or dizziness  Cold, clammy skin or sweating  Feelings of hunger  Headache  Nervousness  A hard, fast heartbeat  Weakness  Confusion or irritability  Blurred vision  What You Should Do  First, check your blood sugar. If it is too low (out of your target range), eat or drink 15 to 20 grams of fast-acting sugar. This may be 3 to 4 glucose tablets, 4 oz (half a cup) fruit juice or regular (non-diet) soda, 8 oz (one cup ) fat-free milk, or 1 tablespoon of honey. Dont take more than this, or your blood sugar may go too high.  Wait 15 minutes. Then recheck your blood sugar if you can.  If your blood sugar is still too low, repeat the steps above and check your blood sugar again. If your blood sugar still has not returned to your target range, contact your healthcare provider or seek emergency care.  Once your blood sugar returns to target range, eat a snack or meal.  Preventing Low Blood Sugar  Eat your meals and snacks at the same times each day. Dont skip meals!  Ask your healthcare provider if it is safe for you to drink alcohol. Never drink on an empty stomach.  Take your medication at the prescribed times.  Always carry a source of fast-acting sugar and a snack when youre away from home.  Other Things to Do  Carry a medical ID card or wear a medical alert bracelet or necklace. It should say that you have diabetes. It should also say what to do if you pass out or have a seizure.  Make sure your family, friends, and coworkers know the signs of low blood sugar. Tell them what to do if your blood sugar falls very low and you cant treat yourself.  Keep a glucagon emergency kit handy. Be sure your  family, friends, and coworkers know how and when to use it. Check it regularly and replace the glucagon before it expires.  Talk to your healthcare team about other things you can do to prevent low blood sugar.    If you experience hypoglycemia several times, call your doctor.   © 5864-3046 [employer], [address] [place] [address], [place] [address]. All rights reserved. This information is not intended as a substitute for professional medical care. Always follow your healthcare professional's instructions.

## 2024-03-25 NOTE — PT/OT/SLP PROGRESS
Physical Therapy Co-Treatment    Patient Name:  Saji Castañeda   MRN:  [id number]    Recommendations:     Discharge Recommendations: Moderate Intensity Therapy  Discharge Equipment Recommendations: hospital bed, lift device (Pressure relief mattress)  Barriers to discharge: Decreased caregiver support and Increased skilled assistance required    Assessment:   Co-evaluation/treatment performed due to patient's multiple deficits requiring two skilled therapists to appropriately and safely assess patient's strength, endurance, functional mobility, and ADL performance while facilitating functional tasks in addition to accommodating for patient's activity tolerance and medical acuity.    Saji Castañeda is a 74 y.o. male admitted with a medical diagnosis of Osteomyelitis of left lower extremity.  He presents with the following impairments/functional limitations: weakness, impaired endurance, impaired balance, pain, decreased safety awareness, impaired sensation, impaired self care skills, impaired functional mobility, decreased lower extremity function, decreased upper extremity function, decreased ROM, impaired coordination, impaired fine motor, impaired skin, edema, impaired joint extensibility, impaired muscle length, orthopedic precautions. Breadth of functional mobility limited due to multiple episodes of bowel incontinence & multiple attempts to place bowel management system. Despite this, patient demonstrating progress with rolling, evidenced by physical assistance level progressing to MaxAx2 & patient able to perform increased cross-body reaching AROM prior to needing assistance. Verbal review of prescribed HEP completed, with patient requiring minimal cues. Patient continues to demonstrate the need for moderate intensity therapy on a daily basis exhibited by decreased independence with self-care and functional mobility.    Rehab Prognosis: Poor; patient would benefit from acute skilled PT services to address these  "deficits and reach maximum level of function.    Recent Surgery: Procedure(s) (LRB):  AMPUTATION, ABOVE KNEE (Left)      Plan:     During this hospitalization, patient to be seen 2 x/week to address the identified rehab impairments via therapeutic activities, therapeutic exercises, neuromuscular re-education and progress toward the following goals:    Plan of Care Expires:  04/06/24    Subjective     Chief Complaint: Pain  Patient/Family Comments/goals: None stated  Pain/Comfort:  Pain Rating 1: Pain present, but not rated  Location - Side 1: Bilateral  Location - Orientation 1: generalized  Location 1: gluteal  Pain Addressed 1: Reposition, Distraction, Nurse notified  Pain Rating Post-Intervention 1: Pain present, but not rated    Pain Rating 2: Pain present, but not rated  Location - Side 2: Left  Location 2: leg  Pain Addressed 2: Reposition, Distraction, Nurse notified  Pain Rating Post-Intervention 2: Pain present, but not rated      Objective:     OT facilitated communication with RN prior to session.  Patient found supine with pulse ox (continuous), telemetry, pressure relief boots, fung catheter upon PT entry to room. RN (Ivone) present in room entire session.    General Precautions: Standard, fall, contact, diabetic   Orthopedic Precautions:LLE non weight bearing, RLE weight bearing as tolerated   Braces:  (Darco shoe (RLE))   Body mass index is 39.13 kg/m².  Oxygen Device: Room Air  Vitals: /68 (BP Location: Left arm, Patient Position: Lying)   Pulse 88   Temp 98.3 °F (36.8 °C) (Oral)   Resp 18   Ht 5' 9" (1.753 m)   Wt 120.2 kg (264 lb 15.9 oz)   SpO2 (!) 93%   BMI 39.13 kg/m²      Functional Mobility:  Bed Mobility: Cuing for breakdown of complex motor sequence into single steps    Rolling Left: x1, MaxAx2 with HOB Flat. Facilitation for later half of cross body reach at UE/LE.  Rolling Right: x1, MaxAx2 with HOB Flat. Facilitation for later half of cross body reach at UE/LE.    Balance: "   Sidelying  Left: ModAx2. Pt assisting w/ RUE via bedrail. Assistance at hips & BLEs related to sustained positioning. Noted muscular tension B when facilitating hip ext  Right: ModAx2. Pt assisting w/ LUE via bedrail. Assistance at hips & BLEs related to sustained positioning. Noted muscular tension B when facilitating hip ext  Total Time Sidelyin mins combined L/R    AM-PAC 6 CLICK MOBILITY  Turning over in bed (including adjusting bedclothes, sheets and blankets)?: 2  Sitting down on and standing up from a chair with arms (e.g., wheelchair, bedside commode, etc.): 1  Moving from lying on back to sitting on the side of the bed?: 2  Moving to and from a bed to a chair (including a wheelchair)?: 1  Need to walk in hospital room?: 1  Climbing 3-5 steps with a railing?: 1  Basic Mobility Total Score: 8     Treatment & Education:  Patient participated in verbal review of HEP, including:  UE  Flexion  Horizontal Abduction  Chest Press  LE  Hip Abduction/Adduction AROM  Heel Slides  SLR  Deep Breathing: Verbal encouragement & review of technique. PT demonstration as well.    Increased time required secondary to RN providing wound care & attempting to place bowel management system (x4)  Increased time required secondary to episodes of bowel incontinence (x4)  Increased time spent with patient due to pain    Patient Education Provided on:  The role of physical therapy and how the patient can benefit from skilled services  The negative effects of prolonged bed rest/sedentary behavior, along with the importance of OOB activity & patient participation with PT  The importance of contacting RN, via call light, for mobility throughout the day  Pt white board updated with current therapists name and level of mobility assistance needed.     Patient left left sidelying with all lines intact and RN & PCTs present.    GOALS:   Multidisciplinary Problems       Physical Therapy Goals          Problem: Physical Therapy    Goal  Priority Disciplines Outcome Goal Variances Interventions   Physical Therapy Goal     PT, PT/OT Ongoing, Not Progressing     Description: Goals to be met by: 24, extended to 24    Patient will increase functional independence with mobility by performin. Supine to sit with Maximum Assistance  2. Sit to supine with Maximum Assistance  3. Rolling to Left and Right with Moderate Assistance  4. Sit to stand transfer with Maximum Assistance with LRAD  5. Bed to chair transfer with Maximum Assistance using LRAD  6. Sitting at edge of bed 10 minutes with Contact Guard Assistance  7. Lower extremity exercise program x15 reps per handout, with assistance as needed                         Time Tracking:     PT Received On: 24  PT Start Time: 1109     PT Stop Time: 1153  PT Total Time (min): 44 min     Billable Minutes: Therapeutic Activity 36 and Therapeutic Exercise 8    Treatment Type: Treatment  PT/PTA: PT     Number of PTA visits since last PT visit: 0     2024

## 2024-03-25 NOTE — SUBJECTIVE & OBJECTIVE
Interval History: NAEON. Pt comfortable, still pleasantly confused but no complaints      Objective:     Vital Signs (Most Recent):  Temp: 98.3 °F (36.8 °C) (03/25/24 1156)  Pulse: 88 (03/25/24 1156)  Resp: 18 (03/25/24 1156)  BP: 134/68 (03/25/24 1156)  SpO2: (!) 93 % (03/25/24 1156) Vital Signs (24h Range):  Temp:  [98.3 °F (36.8 °C)-99.5 °F (37.5 °C)] 98.3 °F (36.8 °C)  Pulse:  [84-94] 88  Resp:  [17-19] 18  SpO2:  [86 %-100 %] 93 %  BP: (115-160)/(53-70) 134/68     Weight: 120.2 kg (264 lb 15.9 oz)  Body mass index is 39.13 kg/m².    Intake/Output Summary (Last 24 hours) at 3/25/2024 1334  Last data filed at 3/25/2024 0653  Gross per 24 hour   Intake --   Output 1200 ml   Net -1200 ml         Physical Exam  Constitutional:       General: He is not in acute distress.     Appearance: Normal appearance. He is well-developed. Ill appearance: chronically ill appearing.   HENT:      Head: Normocephalic and atraumatic.      Right Ear: External ear normal.      Left Ear: External ear normal.      Nose: Nose normal.   Eyes:      General:         Right eye: No discharge.         Left eye: No discharge.      Conjunctiva/sclera: Conjunctivae normal.   Cardiovascular:      Rate and Rhythm: Normal rate and regular rhythm.      Pulses: Normal pulses.   Pulmonary:      Effort: Pulmonary effort is normal. No respiratory distress.      Breath sounds: No stridor.   Abdominal:      General: Abdomen is flat.      Palpations: Abdomen is soft.      Tenderness: There is no abdominal tenderness.   Musculoskeletal:         General: Deformity present.      Cervical back: Normal range of motion.      Comments: L-foot partial amputation present   Skin:     General: Skin is warm and dry.   Neurological:      General: No focal deficit present.      Mental Status: He is alert.             Significant Labs: All pertinent labs within the past 24 hours have been reviewed.    Significant Imaging: I have reviewed all pertinent imaging  results/findings within the past 24 hours.

## 2024-03-25 NOTE — ASSESSMENT & PLAN NOTE
74M with chronic osteomyelitis and poorly controlled diabetes.  Urology consulted for urinary retention and difficult Sams placement.    - retention likely secondary to incomplete emptying from uncontrolled diabetes as well as BPH.  - continue Flomax  - Okay for voiding trial 3/26/24 AM  - rest of care per primary    Please contact urology with any further questions.

## 2024-03-25 NOTE — SUBJECTIVE & OBJECTIVE
"Interval History:   Contacted by primary team regarding increase in hematuria.  Bladder scan 250 ml, Fung advanced with return of 250. Repeat bladder scan 1 ml.  Currently draining clear yellow.      Objective:     Temp:  [98.6 °F (37 °C)-99.5 °F (37.5 °C)] 98.6 °F (37 °C)  Pulse:  [84-94] 84  Resp:  [17-19] 18  SpO2:  [86 %-100 %] 91 %  BP: (115-160)/(53-70) 134/70     Body mass index is 39.13 kg/m².           Drains       Drain  Duration                  Urethral Catheter 03/19/24 1800 5 days                     Physical Exam  Pulmonary:      Effort: Pulmonary effort is normal.   Genitourinary:     Comments: 22 Fr fung draining clear yellow urine   Dressing to sacral wounds  Musculoskeletal:      Comments: LE wounds, dressings/ ace wrap in place    Neurological:      Mental Status: He is alert.           Significant Labs:    BMP:  Recent Labs   Lab 03/23/24  0451 03/24/24  0506 03/25/24  0522    142 140   K 4.3 3.9 4.4    107 108   CO2 24 26 20*   BUN 29* 25* 25*   CREATININE 1.6* 1.4 1.3   CALCIUM 8.0* 8.4* 8.0*       CBC:   Recent Labs   Lab 03/23/24  0451 03/24/24  0506 03/25/24  0522   WBC 8.14 8.37 8.14   HGB 6.8* 8.1* 8.3*   HCT 22.3* 26.5* 28.5*   * 455* 438       Blood Culture:   Recent Labs   Lab 03/19/24  1214   LABBLOO No growth after 5 days.  No growth after 5 days.     Urine Culture: No results for input(s): "LABURIN" in the last 168 hours.  Urine Studies:   Recent Labs   Lab 03/19/24  1623   COLORU Yellow   APPEARANCEUA Clear   PHUR 5.0   SPECGRAV 1.020   PROTEINUA 1+*   GLUCUA Negative   KETONESU Negative   BILIRUBINUA Negative   OCCULTUA 2+*   NITRITE Negative   LEUKOCYTESUR Negative   RBCUA 63*   WBCUA 2   BACTERIA Rare   SQUAMEPITHEL 2   HYALINECASTS 0       Significant Imaging:  All pertinent imaging results/findings from the past 24 hours have been reviewed.    "

## 2024-03-25 NOTE — PLAN OF CARE
Problem: Physical Therapy  Goal: Physical Therapy Goal  Description: Goals to be met by: 24, extended to 24    Patient will increase functional independence with mobility by performin. Supine to sit with Maximum Assistance  2. Sit to supine with Maximum Assistance  3. Rolling to Left and Right with Moderate Assistance  4. Sit to stand transfer with Maximum Assistance with LRAD  5. Bed to chair transfer with Maximum Assistance using LRAD  6. Sitting at edge of bed 10 minutes with Contact Guard Assistance  7. Lower extremity exercise program x15 reps per handout, with assistance as needed    Outcome: Ongoing, Not Progressing

## 2024-03-25 NOTE — ASSESSMENT & PLAN NOTE
Patient's FSGs are uncontrolled due to hyperglycemia on current medication regimen.  Last A1c reviewed-   Lab Results   Component Value Date    HGBA1C >14.0 (H) 03/05/2024     Most recent fingerstick glucose reviewed-   Recent Labs   Lab 03/24/24  2200 03/25/24  0809 03/25/24  1226   POCTGLUCOSE 117* 114* 133*       Current correctional scale   LD SSI  Maintain anti-hyperglycemic dose as follows-   Antihyperglycemics (From admission, onward)      Start     Stop Route Frequency Ordered    03/24/24 2100  insulin detemir U-100 (Levemir) pen 13 Units         -- SubQ 2 times daily 03/24/24 0929    03/24/24 0715  insulin aspart U-100 pen 4-6 Units         -- SubQ with breakfast 03/23/24 0913    03/20/24 1645  insulin aspart U-100 pen 2-4 Units         -- SubQ with dinner 03/20/24 0644    03/20/24 1130  insulin aspart U-100 pen 3-5 Units         -- SubQ with lunch 03/20/24 0644    03/11/24 1521  insulin aspart U-100 pen 0-5 Units         -- SubQ Before meals & nightly PRN 03/11/24 1520          Hold Oral hypoglycemics while patient is in the hospital.  Will keep patient on DKA pathway with insulin drip and fluid with protocol in place for switching to subcutaneous insulin as anion gap closes.      DKA    - Resolved  - Continue insulin regimen and BG checks TIDWM and QHS and 2am  - patient tends to become hypoglycemic around dinnertime  - could be insulin stacking from breakfast and lunch insulin aspart given poor PO intake despite assistance with feeding   - detemir 14 unit BID  - aspart scheduled w/ only breakfast and lunch and dinner with different units  -Inpatient consult to Endocrine

## 2024-03-25 NOTE — PROGRESS NOTES
[facility] - Tele[facility] Medicine  Progress Note    Patient Name: Saji Castañeda  MRN: [id number]  Patient Class: IP- Inpatient   Admission Date: 3/5/2024  Length of Stay: 20 days  Attending Physician: Mahendra Collins III*  Primary Care Provider: [facility] -        Subjective:     Principal Problem:Osteomyelitis of left lower extremity        HPI:  Saji Castañeda is a 74 y.o. male with a medical history of DM2, HLD, HTN, chronic osteomyelitis of L heel, L TMA, PAD, hx of ESBL klebsiella, acinetobacter bacteremia, presenting with hyperglycemia. He presented to the ED via EMS and his POCT glucose on arrival was >500. Serum blood glucose 667 with anion gap of 17 and elevated ketones. Serum potassium 3.4, corrected sodium 149. Urinalysis significant for RBCs, glucosuria, and moderate bacteria and yeast. CBC revealed leukocytosis, elevated platelets, and mild anemia. CMP shows Na 135, K 3.4, BUN 29, Cr 2.1, Glucose 677, Lactate 4.61. BNP and troponin elevated. GFR 32.2. Insulin drip, IV fluids, zosyn, vancomycin, and potassium given.      He is unable to provide much history, but states that he has not been taking his home medications for at least a week. He reports shortness of breath, fatigue, and weakness for the last 6-7 days. He has chills and reports episodes of emesis. He denies abdominal pain, chest pain, palpitations, recent illness/infection, fevers, changes to bowel movements and urinary output. Patient lives with his brother and sister at home. He was last seen in the ED on 2/21 after a fall. He was hypoglycemic BGL 60 at that time which returned to normal after eating. He was also scheduled for a wound clinic appointment today 3/5 at [facility] with Dr. Wilson.        Overview/Hospital Course:  Wound care and cultures collected by Podiatry with recs for Vascular consult for amputation. Patient declined amputation. ID recommending IV Ertapenem for chronic osteomyelitis for 6 week  duration (EED: 4/15/24). With high wound care needs and IV antibiotics plan patient needing long-term placement. He is being treated with topical permethrin for bedbugs. Wound care has been dressing the wounds. 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 Still adjusting insulin to control sugars better. Giving fluids for YNES. Poor PO intake even with feeding assistance. LTAC authorization denied by [employer]. Waiting for SNF placement ([facility], awaiting auth and family to complete paperwork). Patient started spiking fever again and infectious work up done. ID re-consulted. Patient has been counseled multiple times that source control cannot be achieved without amputation. Patient agreeable to AKA scheduled for 3/27. Transitioned to heparin from eliquis. Fung placed for urinary rentention. Discussed new hematuria with urology, hematuria resolved with repositioning of fung.      Interval History: NAEON. Pt comfortable, still pleasantly confused but no complaints      Objective:     Vital Signs (Most Recent):  Temp: 98.3 °F (36.8 °C) (03/25/24 1156)  Pulse: 88 (03/25/24 1156)  Resp: 18 (03/25/24 1156)  BP: 134/68 (03/25/24 1156)  SpO2: (!) 93 % (03/25/24 1156) Vital Signs (24h Range):  Temp:  [98.3 °F (36.8 °C)-99.5 °F (37.5 °C)] 98.3 °F (36.8 °C)  Pulse:  [84-94] 88  Resp:  [17-19] 18  SpO2:  [86 %-100 %] 93 %  BP: (115-160)/(53-70) 134/68     Weight: 120.2 kg (264 lb 15.9 oz)  Body mass index is 39.13 kg/m².    Intake/Output Summary (Last 24 hours) at 3/25/2024 1334  Last data filed at 3/25/2024 0653  Gross per 24 hour   Intake --   Output 1200 ml   Net -1200 ml         Physical Exam  Constitutional:       General: He is not in acute distress.     Appearance: Normal appearance. He is well-developed. Ill appearance: chronically ill appearing.   HENT:      Head: Normocephalic and atraumatic.      Right Ear: External ear normal.      Left Ear: External ear normal.       Nose: Nose normal.   Eyes:      General:         Right eye: No discharge.         Left eye: No discharge.      Conjunctiva/sclera: Conjunctivae normal.   Cardiovascular:      Rate and Rhythm: Normal rate and regular rhythm.      Pulses: Normal pulses.   Pulmonary:      Effort: Pulmonary effort is normal. No respiratory distress.      Breath sounds: No stridor.   Abdominal:      General: Abdomen is flat.      Palpations: Abdomen is soft.      Tenderness: There is no abdominal tenderness.   Musculoskeletal:         General: Deformity present.      Cervical back: Normal range of motion.      Comments: L-foot partial amputation present   Skin:     General: Skin is warm and dry.   Neurological:      General: No focal deficit present.      Mental Status: He is alert.             Significant Labs: All pertinent labs within the past 24 hours have been reviewed.    Significant Imaging: I have reviewed all pertinent imaging results/findings within the past 24 hours.    Assessment/Plan:      * Osteomyelitis of left lower extremity  Patient with Proteus and enterobacter on leg cultures collected by Podiatry with concern for acute on chronic osteo. Fungal culture grew fusarium. He has started spiking fever and still is not agreeable to amputation, will do infectious work up to look out for source.    -UA noninfectious   -Blood culture NGTD  - Inpatient consult to ID and vascular surgery who plans to do AKA on 3/27.  - IV ertapenem with end date 4/15/24   - Added voriconazole for fusarium coverage which grew on fungal culture - 3 months  - midline catheters placed on admission for difficult IV access      Urinary retention  Patient developed new urinary retention.     -Fung's placed   -Flomax  -Renal ultrasound showed no hydronephrosis  - Making adequate urine with fung, c/f hematuria which developed 3/25 and resolved with repositioning of fung. See urology note from same date      BPH (benign prostatic  "hyperplasia)  Sams in placed, placed by urology  Flomax daily      Localized swelling of right upper extremity  Patient has swelling and pain in the right arm and some swelling in the left arm as well    -US b/l upper extremity for DVT showed clot, eliquis transitioned to Heparin while patient is pre-op     History of Bedbug bite with infection  Patient started on Permethrin daily for 5 days starting 3/12      Proteus infection    On ertapenem to cover proteus and enterobacter from left leg wound cx    Chronic anticoagulation  - See DVT      History of amputation of left high mid foot   See osteo      Stage 3b chronic kidney disease  Creatine stable for now. BMP reviewed- noted Estimated Creatinine Clearance: 62.8 mL/min (based on SCr of 1.3 mg/dL). according to latest data. Based on current GFR, CKD stage is stage 3 - GFR 30-59.  Monitor UOP and serial BMP and adjust therapy as needed. Renally dose meds. Avoid nephrotoxic medications and procedures.    Severe sepsis  This patient does have evidence of infective focus  My overall impression is sepsis.  Source: Skin and Soft Tissue (location left leg)  Antibiotics given-   Antibiotics (72h ago, onward)      Start     Stop Route Frequency Ordered    03/08/24 1230  ertapenem (INVANZ) 1 g in sodium chloride 0.9 % 100 mL IVPB (MB+)         -- IV Every 24 hours (non-standard times) 03/08/24 1115          Latest lactate reviewed-  No results for input(s): "LACTATE", "POCLAC" in the last 72 hours.    Organ dysfunction indicated by Acute kidney injury    Fluid challenge Actual Body weight- Patient will receive 30ml/kg actual body weight to calculate fluid bolus for treatment of septic shock.     Post- resuscitation assessment No - Post resuscitation assessment not needed       Will Not start Pressors-     - See osteo      Other hyperlipidemia  Continue home atorvastatin      Diabetic ketoacidosis without coma associated with type 2 diabetes mellitus  Patient's FSGs are " uncontrolled due to hyperglycemia on current medication regimen.  Last A1c reviewed-   Lab Results   Component Value Date    HGBA1C >14.0 (H) 03/05/2024     Most recent fingerstick glucose reviewed-   Recent Labs   Lab 03/24/24  2200 03/25/24  0809 03/25/24  1226   POCTGLUCOSE 117* 114* 133*       Current correctional scale   LD SSI  Maintain anti-hyperglycemic dose as follows-   Antihyperglycemics (From admission, onward)      Start     Stop Route Frequency Ordered    03/24/24 2100  insulin detemir U-100 (Levemir) pen 13 Units         -- SubQ 2 times daily 03/24/24 0929    03/24/24 0715  insulin aspart U-100 pen 4-6 Units         -- SubQ with breakfast 03/23/24 0913    03/20/24 1645  insulin aspart U-100 pen 2-4 Units         -- SubQ with dinner 03/20/24 0644    03/20/24 1130  insulin aspart U-100 pen 3-5 Units         -- SubQ with lunch 03/20/24 0644    03/11/24 1521  insulin aspart U-100 pen 0-5 Units         -- SubQ Before meals & nightly PRN 03/11/24 1520          Hold Oral hypoglycemics while patient is in the hospital.  Will keep patient on DKA pathway with insulin drip and fluid with protocol in place for switching to subcutaneous insulin as anion gap closes.      DKA    - Resolved  - Continue insulin regimen and BG checks TIDWM and QHS and 2am  - patient tends to become hypoglycemic around dinnertime  - could be insulin stacking from breakfast and lunch insulin aspart given poor PO intake despite assistance with feeding   - detemir 14 unit BID  - aspart scheduled w/ only breakfast and lunch and dinner with different units  -Inpatient consult to Endocrine    Paroxysmal atrial fibrillation  Patient with Persistent (7 days or more) atrial fibrillation which is uncontrolled currently with    . Patient is currently in sinus rhythm.[id number] Score: 4. . Anticoagulation indicated. Anticoagulation done with lovenox as we could not get IV access for heparin  .    -Eliquis transitioned to Heparin in preparation for  AKA.    Chronic deep vein thrombosis (DVT) of right upper extremity  - eliquis transitioned to Heparin which was paused temporarily d/t concern of new hematuria which developed 3/25. Discussed with urology, the hematuria resolved with repositioning of the fung. Heparin resumed      Iron deficiency anemia  Patient's anemia is currently uncontrolled. Has received 2 units of PRBCs on 3/23 and 3/16 . Etiology likely d/t chronic blood loss and Iron deficiency  Current CBC reviewed-   Lab Results   Component Value Date    HGB 8.3 (L) 03/25/2024    HCT 28.5 (L) 03/25/2024     Monitor serial CBC and transfuse if patient becomes hemodynamically unstable, symptomatic or H/H drops below 7/21.    Cellulitis of left lower leg  See osteo      Peripheral arterial disease  Resume plavix      YNES (acute kidney injury)  Patient with acute kidney injury/acute renal failure likely due to pre-renal azotemia due to dehydration and post-obstructive d/t urinary retention  YNES is currently stable. Baseline creatinine  1.1  - Labs reviewed- Renal function/electrolytes with Estimated Creatinine Clearance: 62.8 mL/min (based on SCr of 1.3 mg/dL). according to latest data. Monitor urine output and serial BMP and adjust therapy as needed. Avoid nephrotoxins and renally dose meds for GFR listed above.    Creatinine 2.1 on admit, baseline around 1.1  - prerenal vs obstructive (fung placed)     Lab Results   Component Value Date    CREATININE 1.3 03/25/2024       Plan:   - Strict I&Os and daily weights   - Daily BMP  - Trend sCr  - Avoid nephrotoxic agents such as NSAIDs, ACEi, ARBs and IV radiocontrast.  - Renally dose meds to current GFR.  - Maintain MAP > 65.  - No indications for HD at this time.   - Maintain electrolytes at goal: Mg > 2, Phos > 3, K > 4.           Insulin dependent type 2 diabetes mellitus  Patient's FSGs are uncontrolled due to hyperglycemia on current medication regimen.  Last A1c reviewed-   Lab Results   Component Value  Date    HGBA1C >14.0 (H) 03/05/2024     Most recent fingerstick glucose reviewed-   Recent Labs   Lab 03/24/24  2200 03/25/24  0809 03/25/24  1226   POCTGLUCOSE 117* 114* 133*       Current correctional scale  Low  Increase anti-hyperglycemic dose as follows-   Antihyperglycemics (From admission, onward)      Start     Stop Route Frequency Ordered    03/24/24 2100  insulin detemir U-100 (Levemir) pen 13 Units         -- SubQ 2 times daily 03/24/24 0929    03/24/24 0715  insulin aspart U-100 pen 4-6 Units         -- SubQ with breakfast 03/23/24 0913    03/20/24 1645  insulin aspart U-100 pen 2-4 Units         -- SubQ with dinner 03/20/24 0644    03/20/24 1130  insulin aspart U-100 pen 3-5 Units         -- SubQ with lunch 03/20/24 0644    03/11/24 1521  insulin aspart U-100 pen 0-5 Units         -- SubQ Before meals & nightly PRN 03/11/24 1520          Hold Oral hypoglycemics while patient is in the hospital.    - Endocrine's following an making insulin adjustments as needed.    Essential hypertension  Chronic, uncontrolled. Latest blood pressure and vitals reviewed-     Temp:  [98.3 °F (36.8 °C)-99.5 °F (37.5 °C)]   Pulse:  [84-94]   Resp:  [17-19]   BP: (115-160)/(53-70)   SpO2:  [86 %-100 %] .   Home meds for hypertension were reviewed and noted below.   Hypertension Medications               furosemide (LASIX) 40 MG tablet Take 20 mg by mouth.    hydrALAZINE (APRESOLINE) 100 MG tablet Take 1 tablet (100 mg total) by mouth every 8 (eight) hours.    isosorbide dinitrate (ISORDIL) 10 MG tablet Take 1 tablet (10 mg total) by mouth 3 (three) times daily.    NIFEdipine (PROCARDIA-XL) 60 MG (OSM) 24 hr tablet Take 1 tablet (60 mg total) by mouth once daily.            While in the hospital, will manage blood pressure as follows; Adjust home antihypertensive regimen as follows- will keep nifedipine - increased to 90     Will utilize p.r.n. blood pressure medication only if patient's blood pressure greater than 180/110 and  he develops symptoms such as worsening chest pain or shortness of breath.      VTE Risk Mitigation (From admission, onward)           Ordered     heparin 25,000 units in dextrose 5% (100 units/ml) IV bolus from bag HIGH INTENSITY nomogram - [facility]  As needed (PRN)        Question:  Heparin Infusion Adjustment (DO NOT MODIFY ANSWER)  Answer:  [email or web address]\epic\Images\Pharmacy\HeparinInfusions\heparin HIGH INTENSITY nomogram for [facility] [id number].pdf    03/20/24 0744     heparin 25,000 units in dextrose 5% (100 units/ml) IV bolus from bag HIGH INTENSITY nomogram - [facility]  As needed (PRN)        Question:  Heparin Infusion Adjustment (DO NOT MODIFY ANSWER)  Answer:  [email or web address]\epic\Images\Pharmacy\HeparinInfusions\heparin HIGH INTENSITY nomogram for [facility] [id number].pdf    03/20/24 0744     heparin 25,000 units in dextrose 5% 250 mL (100 units/mL) infusion HIGH INTENSITY nomogram - [facility]  Continuous        Question:  Begin at (units/kg/hr)  Answer:  18    03/20/24 0747                    Discharge Planning   OXANA: 3/29/2024     Code Status: Full Code   Is the patient medically ready for discharge?: No    Reason for patient still in hospital (select all that apply): Patient trending condition  Discharge Plan A: Skilled Nursing Facility   Discharge Delays: None known at this time              Jori Byrd MD  Department of Hospital Medicine   [facility] - Telemetry Stepdown

## 2024-03-25 NOTE — ASSESSMENT & PLAN NOTE
- eliquis transitioned to Heparin which was paused temporarily d/t concern of new hematuria which developed 3/25. Discussed with urology, the hematuria resolved with repositioning of the fung. Heparin resumed

## 2024-03-25 NOTE — NURSING
Patient with new hematuria in fung catheter bag, MD notified.Heparin paused, patient bladder scan and fung balloon deflated and adjusted. Hematuria ceased, heparin gtt restarted per written MD order. New ptt ordered.

## 2024-03-25 NOTE — SUBJECTIVE & OBJECTIVE
"Interval HPI:   Overnight events: No acute events overnight. Patient in room 8078/8078 A. Blood glucose stable. BG at and above goal on current insulin regimen (SSI, prandial, and basal insulin ). Steroid use- None.    Renal function- Creatinine 1.3  Vasopressors-  None       Endocrine will continue to follow and manage insulin orders inpatient.       Diet diabetic 2000 Calorie  Diet NPO Except for: Medication     Eatin%  Nausea: No  Hypoglycemia and intervention: No  Fever: No  TPN and/or TF: No      /70   Pulse 84   Temp 98.7 °F (37.1 °C) (Oral)   Resp 17   Ht 5' 9" (1.753 m)   Wt 120.2 kg (264 lb 15.9 oz)   SpO2 100%   BMI 39.13 kg/m²     Labs Reviewed and Include    Recent Labs   Lab 24   GLU 85   CALCIUM 8.0*   ALBUMIN 1.8*   PROT 6.4      K 4.4   CO2 20*      BUN 25*   CREATININE 1.3   ALKPHOS 81   ALT 9*   AST 31   BILITOT 0.2       Lab Results   Component Value Date    WBC 8.14 2024    HGB 8.3 (L) 2024    HCT 28.5 (L) 2024    MCV 85 2024     2024     No results for input(s): "TSH", "FREET4" in the last 168 hours.  Lab Results   Component Value Date    HGBA1C >14.0 (H) 2024       Nutritional status:   Body mass index is 39.13 kg/m².  Lab Results   Component Value Date    ALBUMIN 1.8 (L) 2024    ALBUMIN 1.8 (L) 2024    ALBUMIN 1.9 (L) 2024     Lab Results   Component Value Date    PREALBUMIN 17 (L) 2014    PREALBUMIN 16 (L) 2014    PREALBUMIN 19 (L) 2014       Estimated Creatinine Clearance: 62.8 mL/min (based on SCr of 1.3 mg/dL).    Accu-Checks  Recent Labs     24  1216 24  1621 24  2243 24  0811 24  1219 24  1619 24  2059 24  0921 24  2200   POCTGLUCOSE 312* 169* 238* 185* 123* 130* 141* 171* 83 117*       Current Medications and/or Treatments Impacting Glycemic Control  Immunotherapy:    Immunosuppressants       None "          Steroids:   Hormones (From admission, onward)      Start     Stop Route Frequency Ordered    03/05/24 1536  melatonin tablet 6 mg         -- Oral Nightly PRN 03/05/24 1437          Pressors:    Autonomic Drugs (From admission, onward)      None          Hyperglycemia/Diabetes Medications:   Antihyperglycemics (From admission, onward)      Start     Stop Route Frequency Ordered    03/24/24 2100  insulin detemir U-100 (Levemir) pen 13 Units         -- SubQ 2 times daily 03/24/24 0929    03/24/24 0715  insulin aspart U-100 pen 4-6 Units         -- SubQ with breakfast 03/23/24 0913    03/20/24 1645  insulin aspart U-100 pen 2-4 Units         -- SubQ with dinner 03/20/24 0644    03/20/24 1130  insulin aspart U-100 pen 3-5 Units         -- SubQ with lunch 03/20/24 0644    03/11/24 1521  insulin aspart U-100 pen 0-5 Units         -- SubQ Before meals & nightly PRN 03/11/24 1520

## 2024-03-25 NOTE — PROGRESS NOTES
[facility] - Telemetry Stepdown  Urology  Progress Note    Patient Name: Saji Castañeda  MRN: [id number]  Admission Date: 3/5/2024  Hospital Length of Stay: 20 days  Code Status: Full Code   Attending Provider: Mahendra Collins III*   Primary Care Physician: [facility] -    Subjective:     HPI:  Saji Castañeda is a 73 yo M with a history of Afib, T2D, HLD, HTN, chronic osteomyelitis of L heel, s/p L TMA, PID, ESBL Klebsiella and Acinetobacter bacteremia who was admitted after being found down at home and DKA. Urology consulted for urinary retention and difficult fung placement.      Patient endorses mild suprapubic pain.  No prior episodes of retention.  He was previously followed for his BPH by Dr. Watson. PVR in 2022 office visit was 237.       Nursing reports bladder scan of 1300 mL.  Attempts by nursing to place catheter were unsuccessful due to meeting resistance.  No recent abdominal imaging.  Creatinine 1.8 from baseline 1.3-1.7.  No recent urine studies.  Last A1c > 14.     Twenty-two Wolof coude catheter was placed at bedside with some resistance at the prostate.  There was 1100 mL of clear yellow urine output.    Interval History:   Contacted by primary team regarding increase in hematuria.  Bladder scan 250 ml, Fung advanced with return of 250. Repeat bladder scan 1 ml.  Currently draining clear yellow.      Objective:     Temp:  [98.6 °F (37 °C)-99.5 °F (37.5 °C)] 98.6 °F (37 °C)  Pulse:  [84-94] 84  Resp:  [17-19] 18  SpO2:  [86 %-100 %] 91 %  BP: (115-160)/(53-70) 134/70     Body mass index is 39.13 kg/m².           Drains       Drain  Duration                  Urethral Catheter 03/19/24 1800 5 days                     Physical Exam  Pulmonary:      Effort: Pulmonary effort is normal.   Genitourinary:     Comments: 22 Fr fung draining clear yellow urine   Dressing to sacral wounds  Musculoskeletal:      Comments: LE wounds, dressings/ ace wrap in place    Neurological:      Mental  "Status: He is alert.           Significant Labs:    BMP:  Recent Labs   Lab 03/23/24  0451 03/24/24  0506 03/25/24  0522    142 140   K 4.3 3.9 4.4    107 108   CO2 24 26 20*   BUN 29* 25* 25*   CREATININE 1.6* 1.4 1.3   CALCIUM 8.0* 8.4* 8.0*       CBC:   Recent Labs   Lab 03/23/24  0451 03/24/24  0506 03/25/24  0522   WBC 8.14 8.37 8.14   HGB 6.8* 8.1* 8.3*   HCT 22.3* 26.5* 28.5*   * 455* 438       Blood Culture:   Recent Labs   Lab 03/19/24  1214   LABBLOO No growth after 5 days.  No growth after 5 days.     Urine Culture: No results for input(s): "LABURIN" in the last 168 hours.  Urine Studies:   Recent Labs   Lab 03/19/24  1623   COLORU Yellow   APPEARANCEUA Clear   PHUR 5.0   SPECGRAV 1.020   PROTEINUA 1+*   GLUCUA Negative   KETONESU Negative   BILIRUBINUA Negative   OCCULTUA 2+*   NITRITE Negative   LEUKOCYTESUR Negative   RBCUA 63*   WBCUA 2   BACTERIA Rare   SQUAMEPITHEL 2   HYALINECASTS 0       Significant Imaging:  All pertinent imaging results/findings from the past 24 hours have been reviewed.      Assessment/Plan:     BPH (benign prostatic hyperplasia)  74M with chronic osteomyelitis and poorly controlled diabetes.  Urology consulted for urinary retention and difficult Sams placement.    - retention likely secondary to incomplete emptying from uncontrolled diabetes as well as BPH.  - continue Flomax  - Okay for voiding trial 3/26/24 AM  - rest of care per primary    Please contact urology with any further questions.        VTE Risk Mitigation (From admission, onward)           Ordered     heparin 25,000 units in dextrose 5% (100 units/ml) IV bolus from bag HIGH INTENSITY nomogram - [facility]  As needed (PRN)        Question:  Heparin Infusion Adjustment (DO NOT MODIFY ANSWER)  Answer:  [email or web address]\epic\Images\Pharmacy\HeparinInfusions\heparin HIGH INTENSITY nomogram for [facility] [id number]    03/20/24 0744     heparin 25,000 units in dextrose 5% (100 units/ml) IV bolus from bag HIGH " INTENSITY nomogram - [facility]  As needed (PRN)        Question:  Heparin Infusion Adjustment (DO NOT MODIFY ANSWER)  Answer:  [email or web address]\epic\Images\Pharmacy\HeparinInfusions\heparin HIGH INTENSITY nomogram for [facility] [id number]    03/20/24 0744     heparin 25,000 units in dextrose 5% 250 mL (100 units/mL) infusion HIGH INTENSITY nomogram - [facility]  Continuous        Question:  Begin at (units/kg/hr)  Answer:  18 03/20/24 0747                    Henry Hayes MD  Urology  [facility]

## 2024-03-26 ENCOUNTER — ANESTHESIA EVENT (OUTPATIENT)
Dept: SURGERY | Facility: HOSPITAL | Age: 74
DRG: 474 | End: 2024-03-26
Payer: MEDICARE

## 2024-03-26 LAB
ABO + RH BLD: NORMAL
ALBUMIN SERPL BCP-MCNC: 1.7 G/DL (ref 3.5–5.2)
ALP SERPL-CCNC: 85 U/L (ref 55–135)
ALT SERPL W/O P-5'-P-CCNC: 9 U/L (ref 10–44)
ANION GAP SERPL CALC-SCNC: 11 MMOL/L (ref 8–16)
APTT PPP: 35.5 SEC (ref 21–32)
APTT PPP: 38.7 SEC (ref 21–32)
APTT PPP: 54.2 SEC (ref 21–32)
APTT PPP: 66.3 SEC (ref 21–32)
AST SERPL-CCNC: 26 U/L (ref 10–40)
BASOPHILS # BLD AUTO: 0.03 K/UL (ref 0–0.2)
BASOPHILS NFR BLD: 0.5 % (ref 0–1.9)
BILIRUB SERPL-MCNC: 0.2 MG/DL (ref 0.1–1)
BLD GP AB SCN CELLS X3 SERPL QL: NORMAL
BLD PROD TYP BPU: NORMAL
BLOOD UNIT EXPIRATION DATE: NORMAL
BLOOD UNIT TYPE CODE: 6200
BLOOD UNIT TYPE: NORMAL
BUN SERPL-MCNC: 19 MG/DL (ref 8–23)
CALCIUM SERPL-MCNC: 8.5 MG/DL (ref 8.7–10.5)
CHLORIDE SERPL-SCNC: 109 MMOL/L (ref 95–110)
CO2 SERPL-SCNC: 24 MMOL/L (ref 23–29)
CODING SYSTEM: NORMAL
CREAT SERPL-MCNC: 1.3 MG/DL (ref 0.5–1.4)
CROSSMATCH INTERPRETATION: NORMAL
DIFFERENTIAL METHOD BLD: ABNORMAL
DISPENSE STATUS: NORMAL
EOSINOPHIL # BLD AUTO: 0.3 K/UL (ref 0–0.5)
EOSINOPHIL NFR BLD: 5 % (ref 0–8)
ERYTHROCYTE [DISTWIDTH] IN BLOOD BY AUTOMATED COUNT: 16.4 % (ref 11.5–14.5)
EST. GFR  (NO RACE VARIABLE): 57.3 ML/MIN/1.73 M^2
GLUCOSE SERPL-MCNC: 152 MG/DL (ref 70–110)
HCT VFR BLD AUTO: 24.5 % (ref 40–54)
HGB BLD-MCNC: 7.4 G/DL (ref 14–18)
IMM GRANULOCYTES # BLD AUTO: 0.02 K/UL (ref 0–0.04)
IMM GRANULOCYTES NFR BLD AUTO: 0.3 % (ref 0–0.5)
LYMPHOCYTES # BLD AUTO: 1 K/UL (ref 1–4.8)
LYMPHOCYTES NFR BLD: 16.5 % (ref 18–48)
MCH RBC QN AUTO: 24.7 PG (ref 27–31)
MCHC RBC AUTO-ENTMCNC: 30.2 G/DL (ref 32–36)
MCV RBC AUTO: 82 FL (ref 82–98)
MONOCYTES # BLD AUTO: 0.7 K/UL (ref 0.3–1)
MONOCYTES NFR BLD: 10.7 % (ref 4–15)
NEUTROPHILS # BLD AUTO: 4.1 K/UL (ref 1.8–7.7)
NEUTROPHILS NFR BLD: 67 % (ref 38–73)
NRBC BLD-RTO: 0 /100 WBC
PLATELET # BLD AUTO: 472 K/UL (ref 150–450)
PMV BLD AUTO: 9.6 FL (ref 9.2–12.9)
POCT GLUCOSE: 128 MG/DL (ref 70–110)
POCT GLUCOSE: 158 MG/DL (ref 70–110)
POCT GLUCOSE: 159 MG/DL (ref 70–110)
POCT GLUCOSE: 200 MG/DL (ref 70–110)
POTASSIUM SERPL-SCNC: 3.9 MMOL/L (ref 3.5–5.1)
PROT SERPL-MCNC: 6.9 G/DL (ref 6–8.4)
RBC # BLD AUTO: 3 M/UL (ref 4.6–6.2)
SODIUM SERPL-SCNC: 144 MMOL/L (ref 136–145)
SPECIMEN OUTDATE: NORMAL
TRANS ERYTHROCYTES VOL PATIENT: NORMAL ML
VORICONAZOLE SERPL-MCNC: 3.3 MCG/ML (ref 1–5.5)
WBC # BLD AUTO: 6.18 K/UL (ref 3.9–12.7)

## 2024-03-26 PROCEDURE — 36415 COLL VENOUS BLD VENIPUNCTURE: CPT | Mod: XB | Performed by: FAMILY MEDICINE

## 2024-03-26 PROCEDURE — 85730 THROMBOPLASTIN TIME PARTIAL: CPT | Performed by: FAMILY MEDICINE

## 2024-03-26 PROCEDURE — P9021 RED BLOOD CELLS UNIT: HCPCS

## 2024-03-26 PROCEDURE — 85025 COMPLETE CBC W/AUTO DIFF WBC: CPT

## 2024-03-26 PROCEDURE — 25000003 PHARM REV CODE 250

## 2024-03-26 PROCEDURE — 86920 COMPATIBILITY TEST SPIN: CPT

## 2024-03-26 PROCEDURE — 86850 RBC ANTIBODY SCREEN: CPT

## 2024-03-26 PROCEDURE — 85730 THROMBOPLASTIN TIME PARTIAL: CPT | Mod: 91 | Performed by: FAMILY MEDICINE

## 2024-03-26 PROCEDURE — 63600175 PHARM REV CODE 636 W HCPCS: Performed by: PHYSICIAN ASSISTANT

## 2024-03-26 PROCEDURE — 99232 SBSQ HOSP IP/OBS MODERATE 35: CPT | Mod: ,,,

## 2024-03-26 PROCEDURE — 25000003 PHARM REV CODE 250: Performed by: PHYSICIAN ASSISTANT

## 2024-03-26 PROCEDURE — 80053 COMPREHEN METABOLIC PANEL: CPT

## 2024-03-26 PROCEDURE — A4216 STERILE WATER/SALINE, 10 ML: HCPCS

## 2024-03-26 PROCEDURE — 63600175 PHARM REV CODE 636 W HCPCS

## 2024-03-26 PROCEDURE — 27000207 HC ISOLATION

## 2024-03-26 PROCEDURE — 20600001 HC STEP DOWN PRIVATE ROOM

## 2024-03-26 RX ORDER — HYDROCODONE BITARTRATE AND ACETAMINOPHEN 500; 5 MG/1; MG/1
TABLET ORAL
Status: DISCONTINUED | OUTPATIENT
Start: 2024-03-26 | End: 2024-03-27

## 2024-03-26 RX ADMIN — GABAPENTIN 300 MG: 300 CAPSULE ORAL at 09:03

## 2024-03-26 RX ADMIN — Medication 10 ML: at 12:03

## 2024-03-26 RX ADMIN — INSULIN ASPART 2 UNITS: 100 INJECTION, SOLUTION INTRAVENOUS; SUBCUTANEOUS at 05:03

## 2024-03-26 RX ADMIN — PANTOPRAZOLE SODIUM 40 MG: 40 TABLET, DELAYED RELEASE ORAL at 10:03

## 2024-03-26 RX ADMIN — GABAPENTIN 300 MG: 300 CAPSULE ORAL at 10:03

## 2024-03-26 RX ADMIN — VORICONAZOLE 200 MG: 200 TABLET, FILM COATED ORAL at 09:03

## 2024-03-26 RX ADMIN — INSULIN ASPART 5 UNITS: 100 INJECTION, SOLUTION INTRAVENOUS; SUBCUTANEOUS at 12:03

## 2024-03-26 RX ADMIN — VORICONAZOLE 200 MG: 200 TABLET, FILM COATED ORAL at 10:03

## 2024-03-26 RX ADMIN — Medication 10 ML: at 06:03

## 2024-03-26 RX ADMIN — INSULIN DETEMIR 13 UNITS: 100 INJECTION, SOLUTION SUBCUTANEOUS at 10:03

## 2024-03-26 RX ADMIN — NIFEDIPINE 90 MG: 30 TABLET, FILM COATED, EXTENDED RELEASE ORAL at 10:03

## 2024-03-26 RX ADMIN — HEPARIN SODIUM AND DEXTROSE 18 UNITS/KG/HR: 10000; 5 INJECTION INTRAVENOUS at 05:03

## 2024-03-26 RX ADMIN — COLLAGENASE SANTYL: 250 OINTMENT TOPICAL at 09:03

## 2024-03-26 RX ADMIN — ACETAMINOPHEN 650 MG: 325 TABLET ORAL at 04:03

## 2024-03-26 RX ADMIN — Medication 10 ML: at 05:03

## 2024-03-26 RX ADMIN — TAMSULOSIN HYDROCHLORIDE 0.4 MG: 0.4 CAPSULE ORAL at 10:03

## 2024-03-26 RX ADMIN — ATORVASTATIN CALCIUM 40 MG: 40 TABLET, FILM COATED ORAL at 10:03

## 2024-03-26 RX ADMIN — ERTAPENEM 1 G: 1 INJECTION INTRAMUSCULAR; INTRAVENOUS at 01:03

## 2024-03-26 NOTE — PROGRESS NOTES
[facility] - Telemetry [facility] Medicine  Progress Note    Patient Name: Saji Castañeda  MRN: [id number]  Patient Class: IP- Inpatient   Admission Date: 3/5/2024  Length of Stay: 21 days  Attending Physician: Mahendra Collins III*  Primary Care Provider: [facility] -        Subjective:     Principal Problem:Osteomyelitis of left lower extremity        HPI:  Saji Castañeda is a 74 y.o. male with a medical history of DM2, HLD, HTN, chronic osteomyelitis of L heel, L TMA, PAD, hx of ESBL klebsiella, acinetobacter bacteremia, presenting with hyperglycemia. He presented to the ED via EMS and his POCT glucose on arrival was >500. Serum blood glucose 667 with anion gap of 17 and elevated ketones. Serum potassium 3.4, corrected sodium 149. Urinalysis significant for RBCs, glucosuria, and moderate bacteria and yeast. CBC revealed leukocytosis, elevated platelets, and mild anemia. CMP shows Na 135, K 3.4, BUN 29, Cr 2.1, Glucose 677, Lactate 4.61. BNP and troponin elevated. GFR 32.2. Insulin drip, IV fluids, zosyn, vancomycin, and potassium given.      He is unable to provide much history, but states that he has not been taking his home medications for at least a week. He reports shortness of breath, fatigue, and weakness for the last 6-7 days. He has chills and reports episodes of emesis. He denies abdominal pain, chest pain, palpitations, recent illness/infection, fevers, changes to bowel movements and urinary output. Patient lives with his brother and sister at home. He was last seen in the ED on 2/21 after a fall. He was hypoglycemic BGL 60 at that time which returned to normal after eating. He was also scheduled for a wound clinic appointment today 3/5 at [facility] with Dr. Wilson.        Overview/Hospital Course:  Wound care and cultures collected by Podiatry with recs for Vascular consult for amputation. Patient declined amputation. ID recommending IV Ertapenem for chronic osteomyelitis for 6 week  duration (EED: 4/15/24). With high wound care needs and IV antibiotics plan patient needing long-term placement. He is being treated with topical permethrin for bedbugs. Wound care has been dressing the wounds. 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 Still adjusting insulin to control sugars better. Giving fluids for YNES. Poor PO intake even with feeding assistance. LTAC authorization denied by [employer]. Waiting for SNF placement ([facility], awaiting auth and family to complete paperwork). Patient started spiking fever again and infectious work up done. ID re-consulted. Patient has been counseled multiple times that source control cannot be achieved without amputation. Patient agreeable to AKA scheduled for 3/27. Transitioned to heparin from eliquis. Fung placed for urinary rentention. Discussed new hematuria with urology, hematuria resolved with repositioning of fung. Pt to receive 1u PRBC preoperatively per vascular surgery.      Interval History: NAEON. Pt comfortable, no complaints. Amputation scheduled for tomorrow, pt to receive 1u PRBC today in preparation.       Objective:     Vital Signs (Most Recent):  Temp: 98.5 °F (36.9 °C) (03/26/24 0731)  Pulse: 87 (03/26/24 1130)  Resp: (!) 21 (03/26/24 1114)  BP: 111/76 (03/26/24 1114)  SpO2: (!) 90 % (03/26/24 1114) Vital Signs (24h Range):  Temp:  [97.9 °F (36.6 °C)-98.5 °F (36.9 °C)] 98.5 °F (36.9 °C)  Pulse:  [81-98] 87  Resp:  [16-21] 21  SpO2:  [74 %-95 %] 90 %  BP: (111-147)/(62-76) 111/76     Weight: 120.2 kg (264 lb 15.9 oz)  Body mass index is 39.13 kg/m².    Intake/Output Summary (Last 24 hours) at 3/26/2024 1509  Last data filed at 3/26/2024 0638  Gross per 24 hour   Intake 400 ml   Output 1000 ml   Net -600 ml         Physical Exam  Constitutional:       General: He is not in acute distress.     Appearance: Normal appearance. He is well-developed. Ill appearance: chronically ill appearing.    HENT:      Head: Normocephalic and atraumatic.      Right Ear: External ear normal.      Left Ear: External ear normal.      Nose: Nose normal.   Eyes:      General:         Right eye: No discharge.         Left eye: No discharge.      Conjunctiva/sclera: Conjunctivae normal.   Cardiovascular:      Rate and Rhythm: Normal rate and regular rhythm.      Pulses: Normal pulses.   Pulmonary:      Effort: Pulmonary effort is normal. No respiratory distress.      Breath sounds: No stridor.   Abdominal:      General: Abdomen is flat.      Palpations: Abdomen is soft.      Tenderness: There is no abdominal tenderness.   Musculoskeletal:         General: Swelling and deformity present.      Cervical back: Normal range of motion.      Comments: L-foot partial amputation present  RUE swelling d/t DVT   Skin:     General: Skin is warm and dry.   Neurological:      General: No focal deficit present.      Mental Status: He is alert.             Significant Labs: All pertinent labs within the past 24 hours have been reviewed.    Significant Imaging: I have reviewed all pertinent imaging results/findings within the past 24 hours.    Assessment/Plan:      * Osteomyelitis of left lower extremity  Patient with Proteus and enterobacter on leg cultures collected by Podiatry with concern for acute on chronic osteo. Fungal culture grew fusarium. He has started spiking fever and still is not agreeable to amputation, will do infectious work up to look out for source.    -UA noninfectious   -Blood culture NGTD  - Inpatient consult to ID and vascular surgery who plans to do AKA on 3/27.  - IV ertapenem with end date 4/15/24   - Added voriconazole for fusarium coverage which grew on fungal culture - 3 months  - midline catheters placed on admission for difficult IV access      Urinary retention  Patient developed new urinary retention.     -Sams's placed   -Flomax  -Renal ultrasound showed no hydronephrosis  - Making adequate urine with  "fung, c/f hematuria which developed 3/25 and resolved with repositioning of fung. See urology note from same date      BPH (benign prostatic hyperplasia)  Fung in placed, placed by urology  Flomax daily      Localized swelling of right upper extremity  Patient has swelling and pain in the right arm and some swelling in the left arm as well    -US b/l upper extremity for DVT showed clot, eliquis transitioned to Heparin while patient is pre-op     History of Bedbug bite with infection  Patient started on Permethrin daily for 5 days starting 3/12      Proteus infection    On ertapenem to cover proteus and enterobacter from left leg wound cx    Chronic anticoagulation  - See DVT      History of amputation of left high mid foot   See osteo      Stage 3b chronic kidney disease  Creatine stable for now. BMP reviewed- noted Estimated Creatinine Clearance: 62.8 mL/min (based on SCr of 1.3 mg/dL). according to latest data. Based on current GFR, CKD stage is stage 3 - GFR 30-59.  Monitor UOP and serial BMP and adjust therapy as needed. Renally dose meds. Avoid nephrotoxic medications and procedures.    Severe sepsis  This patient does have evidence of infective focus  My overall impression is sepsis.  Source: Skin and Soft Tissue (location left leg)  Antibiotics given-   Antibiotics (72h ago, onward)      Start     Stop Route Frequency Ordered    03/08/24 1230  ertapenem (INVANZ) 1 g in sodium chloride 0.9 % 100 mL IVPB (MB+)         -- IV Every 24 hours (non-standard times) 03/08/24 1115          Latest lactate reviewed-  No results for input(s): "LACTATE", "POCLAC" in the last 72 hours.    Organ dysfunction indicated by Acute kidney injury    Fluid challenge Actual Body weight- Patient will receive 30ml/kg actual body weight to calculate fluid bolus for treatment of septic shock.     Post- resuscitation assessment No - Post resuscitation assessment not needed       Will Not start Pressors-     - See osteo      Other " hyperlipidemia  Continue home atorvastatin      Diabetic ketoacidosis without coma associated with type 2 diabetes mellitus  Patient's FSGs are uncontrolled due to hyperglycemia on current medication regimen.  Last A1c reviewed-   Lab Results   Component Value Date    HGBA1C >14.0 (H) 03/05/2024     Most recent fingerstick glucose reviewed-   Recent Labs   Lab 03/25/24  1726 03/25/24  2057 03/26/24  0804 03/26/24  1213   POCTGLUCOSE 288* 196* 158* 200*       Current correctional scale   LD SSI  Maintain anti-hyperglycemic dose as follows-   Antihyperglycemics (From admission, onward)      Start     Stop Route Frequency Ordered    03/24/24 2100  insulin detemir U-100 (Levemir) pen 13 Units         -- SubQ 2 times daily 03/24/24 0929    03/24/24 0715  insulin aspart U-100 pen 4-6 Units         -- SubQ with breakfast 03/23/24 0913    03/20/24 1645  insulin aspart U-100 pen 2-4 Units         -- SubQ with dinner 03/20/24 0644    03/20/24 1130  insulin aspart U-100 pen 3-5 Units         -- SubQ with lunch 03/20/24 0644    03/11/24 1521  insulin aspart U-100 pen 0-5 Units         -- SubQ Before meals & nightly PRN 03/11/24 1520          Hold Oral hypoglycemics while patient is in the hospital.  Will keep patient on DKA pathway with insulin drip and fluid with protocol in place for switching to subcutaneous insulin as anion gap closes.      DKA    - Resolved  - Continue insulin regimen and BG checks TIDWM and QHS and 2am  - patient tends to become hypoglycemic around dinnertime  - could be insulin stacking from breakfast and lunch insulin aspart given poor PO intake despite assistance with feeding   - detemir 14 unit BID  - aspart scheduled w/ only breakfast and lunch and dinner with different units  -Inpatient consult to Endocrine    Paroxysmal atrial fibrillation  Patient with Persistent (7 days or more) atrial fibrillation which is uncontrolled currently with    . Patient is currently in sinus rhythm.[id number] Score: 4.  . Anticoagulation indicated. Anticoagulation done with lovenox as we could not get IV access for heparin  .    -Eliquis transitioned to Heparin in preparation for AKA.    Chronic deep vein thrombosis (DVT) of right upper extremity  - eliquis transitioned to Heparin which was paused temporarily d/t concern of new hematuria which developed 3/25. Discussed with urology, the hematuria resolved with repositioning of the fung. Heparin resumed, but will temporarily be paused as patient needs preoperative blood transfusion and has minimal IV access d/t placement difficulties & limb alert on R-arm d/t DVT      Iron deficiency anemia  Patient's anemia is currently uncontrolled. Has received 2 units of PRBCs on 3/23 and 3/16 . Etiology likely d/t chronic blood loss and Iron deficiency. Receiving 1u on 3/26 preoperatively    Current CBC reviewed-   Lab Results   Component Value Date    HGB 7.4 (L) 03/26/2024    HCT 24.5 (L) 03/26/2024     Monitor serial CBC and transfuse if patient becomes hemodynamically unstable, symptomatic or H/H drops below 7/21.    Cellulitis of left lower leg  See osteo      Peripheral arterial disease  Resume plavix      YNES (acute kidney injury)  Resolved    Patient with acute kidney injury/acute renal failure likely due to pre-renal azotemia due to dehydration and post-obstructive d/t urinary retention  YNES is currently stable. Baseline creatinine  1.1  - Labs reviewed- Renal function/electrolytes with Estimated Creatinine Clearance: 62.8 mL/min (based on SCr of 1.3 mg/dL). according to latest data. Monitor urine output and serial BMP and adjust therapy as needed. Avoid nephrotoxins and renally dose meds for GFR listed above.    Creatinine 2.1 on admit, baseline around 1.1  - prerenal vs obstructive (fung placed)     Lab Results   Component Value Date    CREATININE 1.3 03/26/2024       Plan:   - Strict I&Os and daily weights   - Daily BMP  - Trend sCr  - Avoid nephrotoxic agents such as NSAIDs,  ACEi, ARBs and IV radiocontrast.  - Renally dose meds to current GFR.  - Maintain MAP > 65.  - No indications for HD at this time.   - Maintain electrolytes at goal: Mg > 2, Phos > 3, K > 4.           Insulin dependent type 2 diabetes mellitus  Patient's FSGs are uncontrolled due to hyperglycemia on current medication regimen.  Last A1c reviewed-   Lab Results   Component Value Date    HGBA1C >14.0 (H) 03/05/2024     Most recent fingerstick glucose reviewed-   Recent Labs   Lab 03/25/24  1726 03/25/24  2057 03/26/24  0804 03/26/24  1213   POCTGLUCOSE 288* 196* 158* 200*       Current correctional scale  Low  Increase anti-hyperglycemic dose as follows-   Antihyperglycemics (From admission, onward)      Start     Stop Route Frequency Ordered    03/24/24 2100  insulin detemir U-100 (Levemir) pen 13 Units         -- SubQ 2 times daily 03/24/24 0929    03/24/24 0715  insulin aspart U-100 pen 4-6 Units         -- SubQ with breakfast 03/23/24 0913    03/20/24 1645  insulin aspart U-100 pen 2-4 Units         -- SubQ with dinner 03/20/24 0644    03/20/24 1130  insulin aspart U-100 pen 3-5 Units         -- SubQ with lunch 03/20/24 0644    03/11/24 1521  insulin aspart U-100 pen 0-5 Units         -- SubQ Before meals & nightly PRN 03/11/24 1520          Hold Oral hypoglycemics while patient is in the hospital.    - Endocrine's following an making insulin adjustments as needed.    Essential hypertension  Chronic, uncontrolled. Latest blood pressure and vitals reviewed-     Temp:  [97.9 °F (36.6 °C)-98.5 °F (36.9 °C)]   Pulse:  [81-98]   Resp:  [16-21]   BP: (111-147)/(62-76)   SpO2:  [74 %-95 %] .   Home meds for hypertension were reviewed and noted below.   Hypertension Medications               furosemide (LASIX) 40 MG tablet Take 20 mg by mouth.    hydrALAZINE (APRESOLINE) 100 MG tablet Take 1 tablet (100 mg total) by mouth every 8 (eight) hours.    isosorbide dinitrate (ISORDIL) 10 MG tablet Take 1 tablet (10 mg total) by  mouth 3 (three) times daily.    NIFEdipine (PROCARDIA-XL) 60 MG (OSM) 24 hr tablet Take 1 tablet (60 mg total) by mouth once daily.            While in the hospital, will manage blood pressure as follows; Adjust home antihypertensive regimen as follows- will keep nifedipine - increased to 90     Will utilize p.r.n. blood pressure medication only if patient's blood pressure greater than 180/110 and he develops symptoms such as worsening chest pain or shortness of breath.      VTE Risk Mitigation (From admission, onward)           Ordered     heparin 25,000 units in dextrose 5% (100 units/ml) IV bolus from bag HIGH INTENSITY nomogram - [facility]  As needed (PRN)        Question:  Heparin Infusion Adjustment (DO NOT MODIFY ANSWER)  Answer:  [email or web address]\Images\Pharmacy\HeparinInfusions\heparin HIGH INTENSITY nomogram for [facility] [id number]    03/20/24 0744     heparin 25,000 units in dextrose 5% (100 units/ml) IV bolus from bag HIGH INTENSITY nomogram - [facility]  As needed (PRN)        Question:  Heparin Infusion Adjustment (DO NOT MODIFY ANSWER)  Answer:  [email or web address]\Images\Pharmacy\HeparinInfusions\heparin HIGH INTENSITY nomogram for [facility] [id number]    03/20/24 0744     heparin 25,000 units in dextrose 5% 250 mL (100 units/mL) infusion HIGH INTENSITY nomogram - [facility]  Continuous        Question:  Begin at (units/kg/hr)  Answer:  18    03/20/24 0747                    Discharge Planning   OXANA: 3/29/2024     Code Status: Full Code   Is the patient medically ready for discharge?: No    Reason for patient still in hospital (select all that apply): Patient trending condition  Discharge Plan A: Skilled Nursing Facility   Discharge Delays: None known at this time              Jori Byrd MD  Department of Hospital Medicine   [facility] - Telemetry Stepdown

## 2024-03-26 NOTE — PLAN OF CARE
Patient in bed. No complaints voiced. NADN at this time. Patient remain on heparin gtt, no bleeding noted. Safety measures in place. Call light in reach.   Problem: Adult Inpatient Plan of Care  Goal: Plan of Care Review  Outcome: Ongoing, Progressing  Goal: Patient-Specific Goal (Individualized)  Outcome: Ongoing, Progressing  Goal: Absence of Hospital-Acquired Illness or Injury  Outcome: Ongoing, Progressing  Goal: Optimal Comfort and Wellbeing  Outcome: Ongoing, Progressing  Goal: Readiness for Transition of Care  Outcome: Ongoing, Progressing     Problem: Diabetes Comorbidity  Goal: Blood Glucose Level Within Targeted Range  Outcome: Ongoing, Progressing

## 2024-03-26 NOTE — ASSESSMENT & PLAN NOTE
Endocrinology consulted for BG management.   BG goal 140-180    - Levemir (Insulin Detemir) 13 units BID   - Novolog (Insulin Aspart) 4-6 units with breakfast, 3-5 lunch and 2-4 units with dinner and prn for BG excursions [facility] SSI (150/50) (range added due to fluctuating appetite)  - BG checks AC/HS  - Hypoglycemia protocol in place    ** Please notify Endocrine for any change and/or advance in diet**  ** Please call Endocrine for any BG related issues **    Discharge Planning:   TBD. Please notify endocrinology prior to discharge.  Plan Pending:  - Lantus 28 units nightly  - Novolog 5 units TIDWM  - Novolog SSI    150 - 200 + 2 unit    201 - 250 + 4 units    251 - 300 + 6 units    301 - 350 + 8 units       > 350   + 10 units  - Send logs in 3 days    Education:  Discharge Teaching:    Reviewed topics related to DM including: the need for insulin, how insulin works, what makes it a high risk medication, the importance of immediate follow up with either PCP or endocrine provider, importance of and how to check BG, how to record BG on logs, how to administer insulin, appropriate insulin administration sites, importance of rotating injection sites, hyper/hypoglycemia, how and when to treat hypoglycemia, when to hold insulin, how the correction scale works, importance of storing unused insulin in the refrigerator, and when to seek medical attention.  Patient verbalized understanding, answered all questions to patient's satisfaction. Blood sugar logs given to patient.     Hypoglycemia (Low Blood Sugar)  Too little glucose (sugar) in your blood is called hypoglycemia or low blood sugar. Diabetes itself doesnt cause low blood sugar. But some of the treatments for diabetes, such as pills or insulin, may increase your risk for it. Low blood sugar may cause you to lose consciousness or have a seizure. So always treat low blood sugar right away.    Special note: Always carry a source of fast-acting sugar and a snack in case of  hypoglycemia     What You May Notice  If you have low blood sugar, you may have these symptoms:  Shakiness or dizziness  Cold, clammy skin or sweating  Feelings of hunger  Headache  Nervousness  A hard, fast heartbeat  Weakness  Confusion or irritability  Blurred vision  What You Should Do  First, check your blood sugar. If it is too low (out of your target range), eat or drink 15 to 20 grams of fast-acting sugar. This may be 3 to 4 glucose tablets, 4 oz (half a cup) fruit juice or regular (non-diet) soda, 8 oz (one cup ) fat-free milk, or 1 tablespoon of honey. Dont take more than this, or your blood sugar may go too high.  Wait 15 minutes. Then recheck your blood sugar if you can.  If your blood sugar is still too low, repeat the steps above and check your blood sugar again. If your blood sugar still has not returned to your target range, contact your healthcare provider or seek emergency care.  Once your blood sugar returns to target range, eat a snack or meal.  Preventing Low Blood Sugar  Eat your meals and snacks at the same times each day. Dont skip meals!  Ask your healthcare provider if it is safe for you to drink alcohol. Never drink on an empty stomach.  Take your medication at the prescribed times.  Always carry a source of fast-acting sugar and a snack when youre away from home.  Other Things to Do  Carry a medical ID card or wear a medical alert bracelet or necklace. It should say that you have diabetes. It should also say what to do if you pass out or have a seizure.  Make sure your family, friends, and coworkers know the signs of low blood sugar. Tell them what to do if your blood sugar falls very low and you cant treat yourself.  Keep a glucagon emergency kit handy. Be sure your family, friends, and coworkers know how and when to use it. Check it regularly and replace the glucagon before it expires.  Talk to your healthcare team about other things you can do to prevent low blood sugar.    If you  experience hypoglycemia several times, call your doctor.   © 5139-8782 [employer], [address] [place] [address], [place] [address]. All rights reserved. This information is not intended as a substitute for professional medical care. Always follow your healthcare professional's instructions.

## 2024-03-26 NOTE — ASSESSMENT & PLAN NOTE
Chronic, uncontrolled. Latest blood pressure and vitals reviewed-     Temp:  [97.9 °F (36.6 °C)-98.5 °F (36.9 °C)]   Pulse:  [81-98]   Resp:  [16-21]   BP: (111-147)/(62-76)   SpO2:  [74 %-95 %] .   Home meds for hypertension were reviewed and noted below.   Hypertension Medications               furosemide (LASIX) 40 MG tablet Take 20 mg by mouth.    hydrALAZINE (APRESOLINE) 100 MG tablet Take 1 tablet (100 mg total) by mouth every 8 (eight) hours.    isosorbide dinitrate (ISORDIL) 10 MG tablet Take 1 tablet (10 mg total) by mouth 3 (three) times daily.    NIFEdipine (PROCARDIA-XL) 60 MG (OSM) 24 hr tablet Take 1 tablet (60 mg total) by mouth once daily.            While in the hospital, will manage blood pressure as follows; Adjust home antihypertensive regimen as follows- will keep nifedipine - increased to 90     Will utilize p.r.n. blood pressure medication only if patient's blood pressure greater than 180/110 and he develops symptoms such as worsening chest pain or shortness of breath.   Transitional Care Management Telephone Call Attempt    Discharge Date: 5/22/23  Discharge Location: [facility] Hospital: [facility]    Call Attempt Date: 5/23/2023  Call Attempt: First

## 2024-03-26 NOTE — ASSESSMENT & PLAN NOTE
Patient with Persistent (7 days or more) atrial fibrillation which is uncontrolled currently with    . Patient is currently in sinus rhythm.[id number] Score: 4. . Anticoagulation indicated. Anticoagulation done with lovenox as we could not get IV access for heparin  .    -Eliquis transitioned to Heparin in preparation for AKA.

## 2024-03-26 NOTE — SUBJECTIVE & OBJECTIVE
"Interval HPI:   No acute events overnight. Patient in room 8078/8078 A. Blood glucose stable. BG at and above goal on current insulin regimen (SSI, prandial, and basal insulin ). Steroid use- None.      Renal function- Normal   Vasopressors-  None     Diet diabetic  Calorie    Eatin%  Nausea: No  Hypoglycemia and intervention: No  Fever: No  TPN and/or TF: No      BP (!) 147/72 (BP Location: Left arm, Patient Position: Lying)   Pulse 85   Temp 98.5 °F (36.9 °C) (Oral)   Resp 19   Ht 5' 9" (1.753 m)   Wt 120.2 kg (264 lb 15.9 oz)   SpO2 (!) 93%   BMI 39.13 kg/m²     Labs Reviewed and Include    Recent Labs   Lab 24  0413   *   CALCIUM 8.5*   ALBUMIN 1.7*   PROT 6.9      K 3.9   CO2 24      BUN 19   CREATININE 1.3   ALKPHOS 85   ALT 9*   AST 26   BILITOT 0.2     Lab Results   Component Value Date    WBC 6.18 2024    HGB 7.4 (L) 2024    HCT 24.5 (L) 2024    MCV 82 2024     (H) 2024     No results for input(s): "TSH", "FREET4" in the last 168 hours.  Lab Results   Component Value Date    HGBA1C >14.0 (H) 2024       Nutritional status:   Body mass index is 39.13 kg/m².  Lab Results   Component Value Date    ALBUMIN 1.7 (L) 2024    ALBUMIN 1.8 (L) 2024    ALBUMIN 1.8 (L) 2024     Lab Results   Component Value Date    PREALBUMIN 17 (L) 2014    PREALBUMIN 16 (L) 2014    PREALBUMIN 19 (L) 2014       Estimated Creatinine Clearance: 62.8 mL/min (based on SCr of 1.3 mg/dL).    Accu-Checks  Recent Labs     24  1219 24  1619 24  2059 24  0921 24  2200 24  0809 24  1226 24  1726 24  0804   POCTGLUCOSE 130* 141* 171* 83 117* 114* 133* 288* 196* 158*       Current Medications and/or Treatments Impacting Glycemic Control  Immunotherapy:    Immunosuppressants       None          Steroids:   Hormones (From admission, onward)      Start     Stop " Route Frequency Ordered    03/05/24 1536  melatonin tablet 6 mg         -- Oral Nightly PRN 03/05/24 1437          Pressors:    Autonomic Drugs (From admission, onward)      None          Hyperglycemia/Diabetes Medications:   Antihyperglycemics (From admission, onward)      Start     Stop Route Frequency Ordered    03/24/24 2100  insulin detemir U-100 (Levemir) pen 13 Units         -- SubQ 2 times daily 03/24/24 0929    03/24/24 0715  insulin aspart U-100 pen 4-6 Units         -- SubQ with breakfast 03/23/24 0913    03/20/24 1645  insulin aspart U-100 pen 2-4 Units         -- SubQ with dinner 03/20/24 0644    03/20/24 1130  insulin aspart U-100 pen 3-5 Units         -- SubQ with lunch 03/20/24 0644    03/11/24 1521  insulin aspart U-100 pen 0-5 Units         -- SubQ Before meals & nightly PRN 03/11/24 1520

## 2024-03-26 NOTE — ASSESSMENT & PLAN NOTE
Patient's FSGs are uncontrolled due to hyperglycemia on current medication regimen.  Last A1c reviewed-   Lab Results   Component Value Date    HGBA1C >14.0 (H) 03/05/2024     Most recent fingerstick glucose reviewed-   Recent Labs   Lab 03/25/24  1726 03/25/24  2057 03/26/24  0804 03/26/24  1213   POCTGLUCOSE 288* 196* 158* 200*       Current correctional scale  Low  Increase anti-hyperglycemic dose as follows-   Antihyperglycemics (From admission, onward)    Start     Stop Route Frequency Ordered    03/24/24 2100  insulin detemir U-100 (Levemir) pen 13 Units         -- SubQ 2 times daily 03/24/24 0929    03/24/24 0715  insulin aspart U-100 pen 4-6 Units         -- SubQ with breakfast 03/23/24 0913    03/20/24 1645  insulin aspart U-100 pen 2-4 Units         -- SubQ with dinner 03/20/24 0644    03/20/24 1130  insulin aspart U-100 pen 3-5 Units         -- SubQ with lunch 03/20/24 0644    03/11/24 1521  insulin aspart U-100 pen 0-5 Units         -- SubQ Before meals & nightly PRN 03/11/24 1520        Hold Oral hypoglycemics while patient is in the hospital.    - Endocrine's following an making insulin adjustments as needed.

## 2024-03-26 NOTE — ASSESSMENT & PLAN NOTE
Resolved    Patient with acute kidney injury/acute renal failure likely due to pre-renal azotemia due to dehydration and post-obstructive d/t urinary retention  YNES is currently stable. Baseline creatinine  1.1  - Labs reviewed- Renal function/electrolytes with Estimated Creatinine Clearance: 62.8 mL/min (based on SCr of 1.3 mg/dL). according to latest data. Monitor urine output and serial BMP and adjust therapy as needed. Avoid nephrotoxins and renally dose meds for GFR listed above.    Creatinine 2.1 on admit, baseline around 1.1  - prerenal vs obstructive (fung placed)     Lab Results   Component Value Date    CREATININE 1.3 03/26/2024       Plan:   - Strict I&Os and daily weights   - Daily BMP  - Trend sCr  - Avoid nephrotoxic agents such as NSAIDs, ACEi, ARBs and IV radiocontrast.  - Renally dose meds to current GFR.  - Maintain MAP > 65.  - No indications for HD at this time.   - Maintain electrolytes at goal: Mg > 2, Phos > 3, K > 4.

## 2024-03-26 NOTE — ANESTHESIA PREPROCEDURE EVALUATION
[facility]  Anesthesia Pre-Operative Evaluation         Patient Name: Saji Castañeda  YOB: 1950  MRN: [id number]    SUBJECTIVE:     Pre-operative evaluation for Procedure(s) (LRB):  AMPUTATION, ABOVE KNEE (Left)     03/26/2024    Saji Castañeda is a 74 y.o. male w/ a significant PMHx of DM2, HLD, HTN, chronic osteomyelitis of the left heel, left TMA, PAD, multidrug resistant organisms admitted for DKA and Acute on chronic osteomyelitis LLE.  DKA resolved with protocol.  ID consulted, ertapenem planned for 6 weeks with voriconazole.  Vascular surgery and Podiatry recommending AKA/BKA, pt initially declined.     Patient now presents for the above procedure(s).    Echo Summary  Results for orders placed during the hospital encounter of 10/02/23    Echo Saline Bubble? No    Interpretation Summary    Left Ventricle: The left ventricle is normal in size. Ventricular mass is normal. Increased wall thickness. There is concentric remodeling. Normal wall motion. There is normal systolic function with a visually estimated ejection fraction of 60 - 65%. Ejection fraction by visual approximation is 65%. There is normal diastolic function.    Right Ventricle: Normal right ventricular cavity size. Wall thickness is normal. Right ventricle wall motion  is normal. Systolic function is normal.    Aortic Valve: There is aortic valve sclerosis.    Mitral Valve: There is mild mitral annular calcification present.    IVC/SVC: Normal venous pressure at 3 mmHg.       Prev airway: None documented.    LDA:        Peripheral IV - Single Lumen 03/20/24 1453 20 G;1 3/4 in Anterior;Left Forearm (Active)   Site Assessment Clean;Dry;Intact;No redness;No swelling 03/26/24 0400   Extremity Assessment Distal to IV No abnormal discoloration;No redness;No swelling;No warmth 03/24/24 1800   Line Status Infusing 03/24/24 1800   Dressing Status Clean;Dry;Intact 03/24/24 1800   Dressing Intervention Integrity maintained  "03/24/24 1800   Reason Not Rotated Not due 03/21/24 2051   Number of days: 5            Midline Catheter - Single Lumen 03/05/24 2330 Left basilic vein (medial side of arm) (Active)   Site Assessment Clean;Dry;Intact;No redness;No swelling 03/25/24 1009   IV Device Securement catheter securement device 03/25/24 1009   Line Status Blood return noted;Infusing 03/21/24 2051   Dressing Type CHG impregnated dressing/sponge;Central line dressing 03/25/24 1009   Dressing Status Clean;Dry;Intact 03/25/24 1009   Dressing Intervention Sterile dressing change 03/25/24 1009   Dressing Change Due 04/01/24 03/25/24 1009   Site Change Due 04/03/24 03/25/24 1009   Reason Not Rotated Not due 03/21/24 2051   Number of days: 20            Urethral Catheter 03/19/24 1800 (Active)   Site Assessment Clean;Intact 03/25/24 1945   Collection Container Standard drainage bag 03/23/24 1945   Securement Method secured to top of thigh w/ adhesive device 03/25/24 1945   Catheter Care Performed yes 03/25/24 1945   Reason for Continuing Urinary Catheterization Urinary retention 03/25/24 1945   CAUTI Prevention Bundle Securement Device in place with 1" slack 03/25/24 1945   Output (mL) 550 mL 03/26/24 0638   Number of days: 6       Drips:    heparin (porcine) in D5W 16 Units/kg/hr (03/26/24 0148)       Patient Active Problem List   Diagnosis    Edema of leg    Tinea pedis    Wound, open, foot with complication    Diabetic neuropathy    Essential hypertension    Insulin dependent type 2 diabetes mellitus    Cataract cortical, senile    YNES (acute kidney injury)    Anemia of chronic disease    Asymptomatic Mobitz (type) I (Wenckebach's) atrioventricular block    Peripheral arterial disease    PVD (peripheral vascular disease)    Cellulitis of left lower leg    Osteomyelitis of left lower extremity    Pressure injury of left heel, stage 4    Morbid obesity    Iron deficiency anemia    Leg swelling    Foot pain, left    Chronic deep vein thrombosis " (DVT) of right upper extremity    Nonhealing ulcer of right lower leg with fat layer exposed    Hypergranulation    Subacute osteomyelitis of left foot    Osteomyelitis of right lower extremity    Chronic osteomyelitis    Hyperglycemia due to diabetes mellitus    AV block, 2nd degree    Paroxysmal atrial fibrillation    History of complete heart block    Blurred vision, left eye    Asymptomatic bacteriuria    Complete heart block    Diabetic ketoacidosis without coma associated with type 2 diabetes mellitus    Other hyperlipidemia    Severe sepsis    Stage 3b chronic kidney disease    History of amputation of left high mid foot     Long term current use of insulin    Chronic anticoagulation    Proteus infection    History of Bedbug bite with infection    Localized swelling of right upper extremity    BPH (benign prostatic hyperplasia)    Urinary retention       Review of patient's allergies indicates:  No Known Allergies    Current Inpatient Medications:   atorvastatin  40 mg Oral Daily    collagenase   Topical (Top) Daily    ertapenem (INVanz) IV (PEDS and ADULTS)  1 g Intravenous Q24H    gabapentin  300 mg Oral BID    insulin aspart U-100  2-4 Units Subcutaneous with dinner    insulin aspart U-100  3-5 Units Subcutaneous with lunch    insulin aspart U-100  4-6 Units Subcutaneous with breakfast    insulin detemir U-100  13 Units Subcutaneous BID    NIFEdipine  90 mg Oral Daily    pantoprazole  40 mg Oral Daily    sodium chloride 0.9%  10 mL Intravenous Q6H    tamsulosin  0.4 mg Oral Daily    voriconazole  200 mg Oral BID       No current facility-administered medications on file prior to encounter.     Current Outpatient Medications on File Prior to Encounter   Medication Sig Dispense Refill    acetaminophen (TYLENOL) 325 MG tablet Take 650 mg by mouth every 6 (six) hours as needed for Temperature greater than.      acidophilus-pectin, citrus 100 million cell-10 mg Cap Take 1 capsule by mouth 2 (two) times a day.    "   ascorbic acid, vitamin C, (VITAMIN C) 500 MG tablet Take 500 mg by mouth 2 (two) times daily.      B COMPLEX-VITAMIN B12 tablet Take 1 tablet by mouth once daily.      BD AUTOSHIELD DUO PEN NEEDLE 30 gauge x 3/16" Ndle       BD ULTRA-FINE ADITYA PEN NEEDLE 32 gauge x 5/32" Ndle USE FOUR TIMES DAILY WITH MEALS AND NIGHTLY 100 each 0    blood sugar diagnostic (CONTOUR TEST STRIPS) Strp Inject 1 strip into the skin 2 (two) times daily before meals. 100 strip 6    clopidogreL (PLAVIX) 75 mg tablet Take 1 tablet (75 mg total) by mouth once daily. 60 tablet 0    gabapentin (NEURONTIN) 300 MG capsule Take 300 mg by mouth 2 (two) times daily.      insulin aspart U-100 (NOVOLOG) 100 unit/mL (3 mL) InPn pen Inject 6 Units into the skin 3 (three) times daily with meals. 30 mL 3    MICROLET LANCET Misc   0    multivitamin (THERAGRAN) per tablet Take 1 tablet by mouth once daily.      NIFEdipine (PROCARDIA-XL) 60 MG (OSM) 24 hr tablet Take 1 tablet (60 mg total) by mouth once daily. 30 tablet 2    pantoprazole (PROTONIX) 40 MG tablet Take 40 mg by mouth once daily. Before breakfast      pen needle, diabetic 32 gauge x 5/32" Ndle use as directed with insulin 100 each 2    rosuvastatin (CRESTOR) 40 MG Tab Take 40 mg by mouth once daily.      tamsulosin (FLOMAX) 0.4 mg Cap Take 0.4 mg by mouth once daily.      triamcinolone acetonide 0.025% (KENALOG) 0.025 % Oint Apply topically 2 (two) times daily as needed (to affected area).      [DISCONTINUED] hydroCHLOROthiazide (HYDRODIURIL) 25 MG tablet Take 0.5 tablets (12.5 mg total) by mouth every Mon, Wed, Fri. Beginning on 7/4/2022         Past Surgical History:   Procedure Laterality Date    ANGIOGRAPHY OF LOWER EXTREMITY N/A 2/3/2021    Procedure: Angiogram Extremity Bilateral;  Surgeon: Ernst Chacko MD;  Location: [facility] OR [address] [place] [address];  Service: Peripheral Vascular;  Laterality: N/A;  7.4 mintues fluoroscopy time  816.15 mGy  170.17 Gycm2    AORTOGRAPHY WITH EXTREMITY RUNOFF " Bilateral 2/3/2021    Procedure: AORTOGRAM, WITH EXTREMITY RUNOFF;  Surgeon: Ernst Chacko MD;  Location: [facility] OR 2ND FLR;  Service: Peripheral Vascular;  Laterality: Bilateral;    DEBRIDEMENT OF FOOT Left 2/23/2021    Procedure: DEBRIDEMENT, LEFT HEEL;  Surgeon: Mayra Schroeder DPM;  Location: [facility] OR 1ST FLR;  Service: Podiatry;  Laterality: Left;    FOOT AMPUTATION  October 2010    left high midfoot amputation    IMPLANTATION OF LEADLESS PACEMAKER N/A 10/12/2023    Procedure: [id number];  Surgeon: VANESSA Delatorre MD;  Location: [facility] EP LAB;  Service: Cardiology;  Laterality: N/A;  AVB, MICRA, EH, ANES, [id number]       Social History:  Tobacco Use: Low Risk  (3/7/2024)    Patient History     Smoking Tobacco Use: Never     Smokeless Tobacco Use: Never     Passive Exposure: Not on file      Alcohol Use: Not At Risk (3/5/2024)    AUDIT-C     Frequency of Alcohol Consumption: Patient declined     Average Number of Drinks: Patient does not drink     Frequency of Binge Drinking: Never        OBJECTIVE:     Vital Signs Range (Last 24H):  Temp:  [36.6 °C (97.9 °F)-36.9 °C (98.5 °F)]   Pulse:  [81-98]   Resp:  [16-21]   BP: (111-147)/(62-76)   SpO2:  [74 %-95 %]       Significant Labs:  Lab Results   Component Value Date    WBC 6.18 03/26/2024    HGB 7.4 (L) 03/26/2024    HCT 24.5 (L) 03/26/2024     (H) 03/26/2024    CHOL 91 (L) 09/19/2022    TRIG 68 09/19/2022    HDL 40 09/19/2022    ALT 9 (L) 03/26/2024    AST 26 03/26/2024     03/26/2024    K 3.9 03/26/2024     03/26/2024    CREATININE 1.3 03/26/2024    BUN 19 03/26/2024    CO2 24 03/26/2024    TSH 2.04 09/12/2023    PSA 4.8 (H) 12/23/2005    INR 1.2 03/20/2024    HGBA1C >14.0 (H) 03/05/2024       Diagnostic Studies: No relevant studies.    EKG:   Results for orders placed or performed during the hospital encounter of 03/05/24   EKG 12-lead    Collection Time: 03/22/24  2:28 PM   Result Value Ref Range    QRS  Duration 94 ms    [facility] QTC Calculation 435 ms    Narrative    Test Reason : Z91.89,    Vent. Rate : 092 BPM     Atrial Rate : 092 BPM     P-R Int : 296 ms          QRS Dur : 094 ms      QT Int : 352 ms       P-R-T Axes : 050 013 109 degrees     QTc Int : 435 ms    Sinus rhythm with 1st degree A-V block  Nonspecific T wave abnormality  Abnormal ECG  When compared with ECG of 13-MAR-2024 16:18,  Sinus rhythm is no longer with 2nd degree A-V block (Mobitz I)  Vent. rate has increased BY  42 BPM  Nonspecific T wave abnormality now evident in Lateral leads  Confirmed by Toby EVANS MD ([id number]) on 3/22/2024 5:17:59 PM    Referred By: [email or web address]   SELF           Confirmed By:Toby EVANS MD       2D ECHO:  TTE:  Results for orders placed or performed during the hospital encounter of 10/02/23   Echo Saline Bubble? No   Result Value Ref Range    Ascending aorta 3.12 cm    STJ 3.14 cm    AV mean gradient 6 mmHg    Ao peak dl 1.49 m/s    Ao VTI 31.69 cm    IVS 0.73 0.6 - 1.1 cm    LA size 3.89 cm    Left Atrium Major Axis 5.52 cm    Left Atrium Minor Axis 5.20 cm    LVIDd 4.61 3.5 - 6.0 cm    LVIDs 2.88 2.1 - 4.0 cm    LVOT diameter 2.22 cm    LVOT peak VTI 24.96 cm    Posterior Wall 1.24 (A) 0.6 - 1.1 cm    MV Peak A Dl 0.75 m/s    E wave deceleration time 187.90 msec    MV Peak E Dl 0.98 m/s    RA Major Axis 4.42 cm    RA Width 4.71 cm    RVDD 3.50 cm    Sinus 3.54 cm    LA WIDTH 3.66 cm    MV stenosis pressure 1/2 time 54.49 ms    LV Diastolic Volume 97.60 mL    LV Systolic Volume 31.71 mL    LVOT peak dl 1.26 m/s    TDI LATERAL 0.25 m/s    TDI SEPTAL 0.15 m/s    LV LATERAL E/E' RATIO 3.92 m/s    LV SEPTAL E/E' RATIO 6.53 m/s    FS 38 %    LA volume 64.81 cm3    LV mass 156.12 g    ZLVIDD -5.87     ZLVIDS -4.39     Left Ventricle Relative Wall Thickness 0.54 cm    AV valve area 3.05 cm²    AV Velocity Ratio 0.85     AV index (prosthetic) 0.79     MV valve area p 1/2 method 4.04 cm2    E/A ratio 1.31     Mean e' 0.20 m/s     LVOT area 3.9 cm2    LVOT stroke volume 96.57 cm3    AV peak gradient 9 mmHg    E/E' ratio 4.90 m/s    LV Systolic Volume Index 14.0 mL/m2    LV Diastolic Volume Index 43.19 mL/m2    LA Volume Index 28.7 mL/m2    LV Mass Index 69 g/m2    ARDEN by Velocity Ratio 3.27 cm²    BSA 2.34 m2    EF 65 %    Est. RA pres 3 mmHg    Narrative      Left Ventricle: The left ventricle is normal in size. Ventricular mass   is normal. Increased wall thickness. There is concentric remodeling.   Normal wall motion. There is normal systolic function with a visually   estimated ejection fraction of 60 - 65%. Ejection fraction by visual   approximation is 65%. There is normal diastolic function.    Right Ventricle: Normal right ventricular cavity size. Wall thickness   is normal. Right ventricle wall motion  is normal. Systolic function is   normal.    Aortic Valve: There is aortic valve sclerosis.    Mitral Valve: There is mild mitral annular calcification present.    IVC/SVC: Normal venous pressure at 3 mmHg.         ABIMBOLA:  No results found. However, due to the size of the patient record, not all encounters were searched. Please check Results Review for a complete set of results.    ASSESSMENT/PLAN:           Pre-op Assessment    I have reviewed the Patient Summary Reports.     I have reviewed the Nursing Notes.    I have reviewed the Medications.     Review of Systems  Anesthesia Hx:   Neg history of prior surgery.          Denies Family Hx of Anesthesia complications.    Denies Personal Hx of Anesthesia complications.                    Social:  Non-Smoker       Hematology/Oncology:       -- Denies Anemia:                                  Cardiovascular:     Hypertension   Denies MI.     Denies CABG/stent. Dysrhythmias    Denies CHF.       ECG has been reviewed.                          Pulmonary:    Denies COPD.  Denies Asthma.                    Renal/:  Chronic Renal Disease                Hepatic/GI:      Denies GERD. Denies Liver  Disease.            Musculoskeletal:         Denies Spine Disorders             Neurological:    Denies CVA.    Denies Seizures.                                Endocrine:  Diabetes Denies Hypothyroidism.              Physical Exam  General: Well nourished, Cooperative, Alert and Oriented    Airway:  Mallampati: II   Neck ROM: Normal ROM    Dental:  Has one tooth      Anesthesia Plan  Type of Anesthesia, risks & benefits discussed:    Anesthesia Type: Gen Supraglottic Airway, Gen Natural Airway, MAC, Gen ETT  Intra-op Monitoring Plan: Standard ASA Monitors  Post Op Pain Control Plan: multimodal analgesia and IV/PO Opioids PRN  Induction:  IV  Airway Plan: Direct and Video  Informed Consent: Informed consent signed with the Patient and all parties understand the risks and agree with anesthesia plan.  All questions answered. Patient consented to blood products? Yes  ASA Score: 3  Day of Surgery Review of History & Physical: H&P Update referred to the surgeon/provider.    Ready For Surgery From Anesthesia Perspective.     .

## 2024-03-26 NOTE — SUBJECTIVE & OBJECTIVE
Interval History: NAEON. Pt comfortable, no complaints. Amputation scheduled for tomorrow, pt to receive 1u PRBC today in preparation.       Objective:     Vital Signs (Most Recent):  Temp: 98.5 °F (36.9 °C) (03/26/24 0731)  Pulse: 87 (03/26/24 1130)  Resp: (!) 21 (03/26/24 1114)  BP: 111/76 (03/26/24 1114)  SpO2: (!) 90 % (03/26/24 1114) Vital Signs (24h Range):  Temp:  [97.9 °F (36.6 °C)-98.5 °F (36.9 °C)] 98.5 °F (36.9 °C)  Pulse:  [81-98] 87  Resp:  [16-21] 21  SpO2:  [74 %-95 %] 90 %  BP: (111-147)/(62-76) 111/76     Weight: 120.2 kg (264 lb 15.9 oz)  Body mass index is 39.13 kg/m².    Intake/Output Summary (Last 24 hours) at 3/26/2024 1509  Last data filed at 3/26/2024 0638  Gross per 24 hour   Intake 400 ml   Output 1000 ml   Net -600 ml         Physical Exam  Constitutional:       General: He is not in acute distress.     Appearance: Normal appearance. He is well-developed. Ill appearance: chronically ill appearing.   HENT:      Head: Normocephalic and atraumatic.      Right Ear: External ear normal.      Left Ear: External ear normal.      Nose: Nose normal.   Eyes:      General:         Right eye: No discharge.         Left eye: No discharge.      Conjunctiva/sclera: Conjunctivae normal.   Cardiovascular:      Rate and Rhythm: Normal rate and regular rhythm.      Pulses: Normal pulses.   Pulmonary:      Effort: Pulmonary effort is normal. No respiratory distress.      Breath sounds: No stridor.   Abdominal:      General: Abdomen is flat.      Palpations: Abdomen is soft.      Tenderness: There is no abdominal tenderness.   Musculoskeletal:         General: Swelling and deformity present.      Cervical back: Normal range of motion.      Comments: L-foot partial amputation present  RUE swelling d/t DVT   Skin:     General: Skin is warm and dry.   Neurological:      General: No focal deficit present.      Mental Status: He is alert.             Significant Labs: All pertinent labs within the past 24 hours  have been reviewed.    Significant Imaging: I have reviewed all pertinent imaging results/findings within the past 24 hours.

## 2024-03-26 NOTE — ASSESSMENT & PLAN NOTE
- eliquis transitioned to Heparin which was paused temporarily d/t concern of new hematuria which developed 3/25. Discussed with urology, the hematuria resolved with repositioning of the fung. Heparin resumed, but will temporarily be paused as patient needs preoperative blood transfusion and has minimal IV access d/t placement difficulties & limb alert on R-arm d/t DVT

## 2024-03-26 NOTE — PROGRESS NOTES
[facility] - Tele[facility] Stepdown  Wound Care    Patient Name:  Saji Castañeda   MRN:  [id number]  Date: 3/26/2024  Diagnosis: Osteomyelitis of left lower extremity    History:     Past Medical History:   Diagnosis Date    Arthritis     legs    Bacteremia due to Gram-negative bacteria 3/23/2021    Diabetes mellitus     Diabetes mellitus, type 2     Hyperlipidemia     Hypertension     Osteomyelitis     Palliative care encounter 5/24/2023       Social History     Socioeconomic History    Marital status: Single   Tobacco Use    Smoking status: Never    Smokeless tobacco: Never   Substance and Sexual Activity    Alcohol use: No     Comment: occassional    Drug use: No    Sexual activity: Not Currently   Social History Narrative    Not currently working; lives with family     Social Determinants of Health     Financial Resource Strain: Medium Risk (3/5/2024)    Overall Financial Resource Strain (CARDIA)     Difficulty of Paying Living Expenses: Somewhat hard   Food Insecurity: No Food Insecurity (3/5/2024)    Hunger Vital Sign     Worried About Running Out of Food in the Last Year: Never true     Ran Out of Food in the Last Year: Never true   Transportation Needs: No Transportation Needs (3/5/2024)    PRAPARE - Transportation     Lack of Transportation (Medical): No     Lack of Transportation (Non-Medical): No   Physical Activity: Inactive (3/5/2024)    Exercise Vital Sign     Days of Exercise per Week: 0 days     Minutes of Exercise per Session: 0 min   Stress: No Stress Concern Present (3/5/2024)    [employer] [place] [employer] - Occupational Stress Questionnaire     Feeling of Stress : Only a little   Social Connections: Socially Isolated (3/5/2024)    Social Connection and Isolation Panel [NHANES]     Frequency of Communication with Friends and Family: More than three times a week     Frequency of Social Gatherings with Friends and Family: Patient declined     Attends Sikhism Services: Never     Active Member  of Clubs or Organizations: No     Attends Club or Organization Meetings: Never     Marital Status: Never    Housing Stability: Low Risk  (3/5/2024)    Housing Stability Vital Sign     Unable to Pay for Housing in the Last Year: No     Number of Places Lived in the Last Year: 1     Unstable Housing in the Last Year: No       Precautions:     Allergies as of 03/05/2024    (No Known Allergies)       WO Assessment Details/Treatment     Patient seen for wound care consultation.   Reviewed chart for this encounter.   See Flow Sheet for findings.      RECOMMENDATIONS: Patient is Aox4 and agreeable to inpatient wound care. Inpatient wound care following for bilateral buttocks and left trochanter. Left trochanter noted to have slough at the base of the wound. Continue santyl wound care orders for autolytic debridement. Buttocks picture epic upload error. Buttocks still has areas of eschar. Serosanguineous drainage noted. Continue wound care orders or vashe damp dressing and ABD pad. No other issues or concerns at this time. Defer to podiatry wound care orders for lower extremity wounds. Will follow up with patient 4/2/2024 or sooner if needed.     Discussed POC with patient and primary nurse.   See EMR for orders & patient education.    Discussed nutrition and the role of protein in wound healing with the patient. Instructed patient to optimize protein for wound healing.    Bedside nursing to continue care & monitoring.  Bedside nursing to maintain pressure injury prevention interventions.            03/26/24 1110   WOCN Assessment   WOCN Total Time (mins) 45   Visit Date 03/26/24   Visit Time 1110   Consult Type Follow Up   WOCN Speciality Wound   Intervention assessed;changed;applied;chart review;coordination of care;orders   Teaching on-going        Altered Skin Integrity 03/05/24 0949 Left Buttocks #6 Ulceration Partial thickness tissue loss. Shallow open ulcer with a red or pink wound bed, without slough. Intact  or Open/Ruptured Serum-filled blister.   Date First Assessed/Time First Assessed: 03/05/24 0949   Altered Skin Integrity Present on Admission - Did Patient arrive to the hospital with altered skin?: yes  Side: Left  Location: Buttocks  Wound Number: #6  Primary Wound Type: Ulceration  Descri...   Dressing Appearance Dried drainage   Drainage Amount Scant   Drainage Characteristics/Odor Serosanguineous   Appearance Pink;Red;Eschar;Slough   Care Cleansed with:;Antimicrobial agent   Dressing Removed;Applied;Gauze, wet to moist   Dressing Change Due 03/26/24        Altered Skin Integrity 03/06/24 1000 Left dorsal Greater trochanter   Date First Assessed/Time First Assessed: 03/06/24 1000   Altered Skin Integrity Present on Admission - Did Patient arrive to the hospital with altered skin?: yes  Side: Left  Orientation: dorsal  Location: Greater trochanter  Is this injury device rel...   Wound Image    Dressing Appearance Dry   Drainage Amount None   Drainage Characteristics/Odor No odor   Appearance Pink;Tan;Slough   Care Cleansed with:;Antimicrobial agent   Dressing Applied;Foam   Dressing Change Due 03/27/24       Orders placed.   Trip Yarbrough RN  03/26/2024

## 2024-03-26 NOTE — PROGRESS NOTES
[facility] - Tele[facility] Stepdown  Endocrinology  Progress Note    Admit Date: 3/5/2024     Reason for Consult: Management of T2DM, Hyperglycemia     Diabetes diagnosis year: > 25 years    Home Diabetes Medications:  Novolog 6 units w/ meals and Lantus 45 units nightly (states he is not taking).     How often checking glucose at home?  Not really checking    BG readings on regimen: 200's  Hypoglycemia on the regimen?  Yes (When he was taking the Lantus)  Missed doses on regimen?  Yes    Diabetes Complications include:     Hyperglycemia, Hypoglycemia , Diabetic chronic kidney disease     , Diabetic dermatitis, Foot ulcer  , and Periodontal disease    Complicating diabetes co morbidities:   HTN; HLD      HPI: Saji Castañeda is a 75 yo M with a history of Afib, T2D, HLD, HTN, chronic osteomyelitis of L heel, s/p L TMA, PID, ESBL Klebsiella and Acinetobacter bacteremia who was admitted after being found down at home and DKA. Endocrine consulted to manage hyperglycemia and type 2 diabetes.   Hemoglobin A1C   Date Value Ref Range Status   03/05/2024 >14.0 (H) 4.0 - 5.6 % Final     Comment:     ADA Screening Guidelines:  5.7-6.4%  Consistent with prediabetes  >or=6.5%  Consistent with diabetes    High levels of fetal hemoglobin interfere with the HbA1C  assay. Heterozygous hemoglobin variants (HbS, HgC, etc)do  not significantly interfere with this assay.   However, presence of multiple variants may affect accuracy.     09/13/2023 11.0 (H) 4.5 - 5.7 % Final   03/10/2023 8.4 (H) 4.0 - 5.6 % Final     Comment:     ADA Screening Guidelines:  5.7-6.4%  Consistent with prediabetes  >or=6.5%  Consistent with diabetes    High levels of fetal hemoglobin interfere with the HbA1C  assay. Heterozygous hemoglobin variants (HbS, HgC, etc)do  not significantly interfere with this assay.   However, presence of multiple variants may affect accuracy.     09/17/2022 9.9 (H) 4.0 - 5.6 % Final     Comment:     ADA Screening Guidelines:  5.7-6.4%   "Consistent with prediabetes  >or=6.5%  Consistent with diabetes    High levels of fetal hemoglobin interfere with the HbA1C  assay. Heterozygous hemoglobin variants (HbS, HgC, etc)do  not significantly interfere with this assay.   However, presence of multiple variants may affect accuracy.              Interval HPI:   No acute events overnight. Patient in room 8078/8078 A. Blood glucose stable. BG at and above goal on current insulin regimen (SSI, prandial, and basal insulin ). Steroid use- None.      Renal function- Normal   Vasopressors-  None     Diet diabetic  Calorie    Eatin%  Nausea: No  Hypoglycemia and intervention: No  Fever: No  TPN and/or TF: No      BP (!) 147/72 (BP Location: Left arm, Patient Position: Lying)   Pulse 85   Temp 98.5 °F (36.9 °C) (Oral)   Resp 19   Ht 5' 9" (1.753 m)   Wt 120.2 kg (264 lb 15.9 oz)   SpO2 (!) 93%   BMI 39.13 kg/m²     Labs Reviewed and Include    Recent Labs   Lab 24  0413   *   CALCIUM 8.5*   ALBUMIN 1.7*   PROT 6.9      K 3.9   CO2 24      BUN 19   CREATININE 1.3   ALKPHOS 85   ALT 9*   AST 26   BILITOT 0.2     Lab Results   Component Value Date    WBC 6.18 2024    HGB 7.4 (L) 2024    HCT 24.5 (L) 2024    MCV 82 2024     (H) 2024     No results for input(s): "TSH", "FREET4" in the last 168 hours.  Lab Results   Component Value Date    HGBA1C >14.0 (H) 2024       Nutritional status:   Body mass index is 39.13 kg/m².  Lab Results   Component Value Date    ALBUMIN 1.7 (L) 2024    ALBUMIN 1.8 (L) 2024    ALBUMIN 1.8 (L) 2024     Lab Results   Component Value Date    PREALBUMIN 17 (L) 2014    PREALBUMIN 16 (L) 2014    PREALBUMIN 19 (L) 2014       Estimated Creatinine Clearance: 62.8 mL/min (based on SCr of 1.3 mg/dL).    Accu-Checks  Recent Labs     24  1219 24  1619 24  2059 24  0921 24  2200 24  0809 24  1226 " 03/25/24  1726 03/25/24  2057 03/26/24  0804   POCTGLUCOSE 130* 141* 171* 83 117* 114* 133* 288* 196* 158*       Current Medications and/or Treatments Impacting Glycemic Control  Immunotherapy:    Immunosuppressants       None          Steroids:   Hormones (From admission, onward)      Start     Stop Route Frequency Ordered    03/05/24 1536  melatonin tablet 6 mg         -- Oral Nightly PRN 03/05/24 1437          Pressors:    Autonomic Drugs (From admission, onward)      None          Hyperglycemia/Diabetes Medications:   Antihyperglycemics (From admission, onward)      Start     Stop Route Frequency Ordered    03/24/24 2100  insulin detemir U-100 (Levemir) pen 13 Units         -- SubQ 2 times daily 03/24/24 0929    03/24/24 0715  insulin aspart U-100 pen 4-6 Units         -- SubQ with breakfast 03/23/24 0913    03/20/24 1645  insulin aspart U-100 pen 2-4 Units         -- SubQ with dinner 03/20/24 0644    03/20/24 1130  insulin aspart U-100 pen 3-5 Units         -- SubQ with lunch 03/20/24 0644    03/11/24 1521  insulin aspart U-100 pen 0-5 Units         -- SubQ Before meals & nightly PRN 03/11/24 1520            ASSESSMENT and PLAN    ID  * Osteomyelitis of left lower extremity  Managed per primary team  Optimize BG control to improve wound healing        Oncology  Iron deficiency anemia      May affect accuracy of A1C     Endocrine  Insulin dependent type 2 diabetes mellitus  Endocrinology consulted for BG management.   BG goal 140-180    - Levemir (Insulin Detemir) 13 units BID   - Novolog (Insulin Aspart) 4-6 units with breakfast, 3-5 lunch and 2-4 units with dinner and prn for BG excursions LDC SSI (150/50) (range added due to fluctuating appetite)  - BG checks AC/HS  - Hypoglycemia protocol in place    ** Please notify Endocrine for any change and/or advance in diet**  ** Please call Endocrine for any BG related issues **    Discharge Planning:   TBD. Please notify endocrinology prior to discharge.      Missy  HAILEY Yadav  Endocrinology  [facility] - Telemetry Stepdown

## 2024-03-26 NOTE — ASSESSMENT & PLAN NOTE
Patient's FSGs are uncontrolled due to hyperglycemia on current medication regimen.  Last A1c reviewed-   Lab Results   Component Value Date    HGBA1C >14.0 (H) 03/05/2024     Most recent fingerstick glucose reviewed-   Recent Labs   Lab 03/25/24  1726 03/25/24  2057 03/26/24  0804 03/26/24  1213   POCTGLUCOSE 288* 196* 158* 200*       Current correctional scale   LD SSI  Maintain anti-hyperglycemic dose as follows-   Antihyperglycemics (From admission, onward)    Start     Stop Route Frequency Ordered    03/24/24 2100  insulin detemir U-100 (Levemir) pen 13 Units         -- SubQ 2 times daily 03/24/24 0929    03/24/24 0715  insulin aspart U-100 pen 4-6 Units         -- SubQ with breakfast 03/23/24 0913    03/20/24 1645  insulin aspart U-100 pen 2-4 Units         -- SubQ with dinner 03/20/24 0644    03/20/24 1130  insulin aspart U-100 pen 3-5 Units         -- SubQ with lunch 03/20/24 0644    03/11/24 1521  insulin aspart U-100 pen 0-5 Units         -- SubQ Before meals & nightly PRN 03/11/24 1520        Hold Oral hypoglycemics while patient is in the hospital.  Will keep patient on DKA pathway with insulin drip and fluid with protocol in place for switching to subcutaneous insulin as anion gap closes.      DKA    - Resolved  - Continue insulin regimen and BG checks TIDWM and QHS and 2am  - patient tends to become hypoglycemic around dinnertime  - could be insulin stacking from breakfast and lunch insulin aspart given poor PO intake despite assistance with feeding   - detemir 14 unit BID  - aspart scheduled w/ only breakfast and lunch and dinner with different units  -Inpatient consult to Endocrine

## 2024-03-26 NOTE — ASSESSMENT & PLAN NOTE
Patient's anemia is currently uncontrolled. Has received 2 units of PRBCs on 3/23 and 3/16 . Etiology likely d/t chronic blood loss and Iron deficiency. Receiving 1u on 3/26 preoperatively    Current CBC reviewed-   Lab Results   Component Value Date    HGB 7.4 (L) 03/26/2024    HCT 24.5 (L) 03/26/2024     Monitor serial CBC and transfuse if patient becomes hemodynamically unstable, symptomatic or H/H drops below 7/21.

## 2024-03-27 ENCOUNTER — ANESTHESIA (OUTPATIENT)
Dept: SURGERY | Facility: HOSPITAL | Age: 74
DRG: 474 | End: 2024-03-27
Payer: MEDICARE

## 2024-03-27 LAB
POCT GLUCOSE: 130 MG/DL (ref 70–110)
POCT GLUCOSE: 132 MG/DL (ref 70–110)
POCT GLUCOSE: 174 MG/DL (ref 70–110)
POCT GLUCOSE: 248 MG/DL (ref 70–110)
POCT GLUCOSE: 250 MG/DL (ref 70–110)
POCT GLUCOSE: 251 MG/DL (ref 70–110)

## 2024-03-27 PROCEDURE — 0Y6D0Z3 DETACHMENT AT LEFT UPPER LEG, LOW, OPEN APPROACH: ICD-10-PCS | Performed by: SURGERY

## 2024-03-27 PROCEDURE — 25000003 PHARM REV CODE 250: Performed by: STUDENT IN AN ORGANIZED HEALTH CARE EDUCATION/TRAINING PROGRAM

## 2024-03-27 PROCEDURE — 27590 AMPUTATE LEG AT THIGH: CPT | Mod: LT,,, | Performed by: SURGERY

## 2024-03-27 PROCEDURE — 88307 TISSUE EXAM BY PATHOLOGIST: CPT | Performed by: PATHOLOGY

## 2024-03-27 PROCEDURE — 25000003 PHARM REV CODE 250

## 2024-03-27 PROCEDURE — 82962 GLUCOSE BLOOD TEST: CPT | Performed by: SURGERY

## 2024-03-27 PROCEDURE — 36000711: Performed by: SURGERY

## 2024-03-27 PROCEDURE — 63600175 PHARM REV CODE 636 W HCPCS

## 2024-03-27 PROCEDURE — 25000003 PHARM REV CODE 250: Performed by: PHYSICIAN ASSISTANT

## 2024-03-27 PROCEDURE — 36000710: Performed by: SURGERY

## 2024-03-27 PROCEDURE — 71000015 HC POSTOP RECOV 1ST HR: Performed by: SURGERY

## 2024-03-27 PROCEDURE — A4216 STERILE WATER/SALINE, 10 ML: HCPCS

## 2024-03-27 PROCEDURE — 27201423 OPTIME MED/SURG SUP & DEVICES STERILE SUPPLY: Performed by: SURGERY

## 2024-03-27 PROCEDURE — 37000009 HC ANESTHESIA EA ADD 15 MINS: Performed by: SURGERY

## 2024-03-27 PROCEDURE — 63600175 PHARM REV CODE 636 W HCPCS: Performed by: SURGERY

## 2024-03-27 PROCEDURE — 63600175 PHARM REV CODE 636 W HCPCS: Performed by: STUDENT IN AN ORGANIZED HEALTH CARE EDUCATION/TRAINING PROGRAM

## 2024-03-27 PROCEDURE — C1729 CATH, DRAINAGE: HCPCS | Performed by: SURGERY

## 2024-03-27 PROCEDURE — 27000207 HC ISOLATION

## 2024-03-27 PROCEDURE — 37000008 HC ANESTHESIA 1ST 15 MINUTES: Performed by: SURGERY

## 2024-03-27 PROCEDURE — 64447 NJX AA&/STRD FEMORAL NRV IMG: CPT | Performed by: STUDENT IN AN ORGANIZED HEALTH CARE EDUCATION/TRAINING PROGRAM

## 2024-03-27 PROCEDURE — 63600175 PHARM REV CODE 636 W HCPCS: Performed by: PHYSICIAN ASSISTANT

## 2024-03-27 PROCEDURE — D9220A PRA ANESTHESIA: Mod: ,,, | Performed by: ANESTHESIOLOGY

## 2024-03-27 PROCEDURE — 20600001 HC STEP DOWN PRIVATE ROOM

## 2024-03-27 PROCEDURE — 99024 POSTOP FOLLOW-UP VISIT: CPT | Mod: ,,, | Performed by: SURGERY

## 2024-03-27 PROCEDURE — 71000033 HC RECOVERY, INTIAL HOUR: Performed by: SURGERY

## 2024-03-27 PROCEDURE — 88307 TISSUE EXAM BY PATHOLOGIST: CPT | Mod: 26,,, | Performed by: PATHOLOGY

## 2024-03-27 PROCEDURE — 64447 NJX AA&/STRD FEMORAL NRV IMG: CPT | Mod: 59,LT,, | Performed by: SURGERY

## 2024-03-27 RX ORDER — PROPOFOL 10 MG/ML
VIAL (ML) INTRAVENOUS
Status: DISCONTINUED | OUTPATIENT
Start: 2024-03-27 | End: 2024-03-27

## 2024-03-27 RX ORDER — MIDAZOLAM HYDROCHLORIDE 1 MG/ML
.5-4 INJECTION, SOLUTION INTRAMUSCULAR; INTRAVENOUS
Status: DISCONTINUED | OUTPATIENT
Start: 2024-03-27 | End: 2024-03-27 | Stop reason: HOSPADM

## 2024-03-27 RX ORDER — FENTANYL CITRATE 50 UG/ML
25-200 INJECTION, SOLUTION INTRAMUSCULAR; INTRAVENOUS
Status: DISCONTINUED | OUTPATIENT
Start: 2024-03-27 | End: 2024-03-27 | Stop reason: HOSPADM

## 2024-03-27 RX ORDER — CELECOXIB 200 MG/1
400 CAPSULE ORAL
Status: DISCONTINUED | OUTPATIENT
Start: 2024-03-27 | End: 2024-03-27 | Stop reason: HOSPADM

## 2024-03-27 RX ORDER — INSULIN ASPART 100 [IU]/ML
0-5 INJECTION, SOLUTION INTRAVENOUS; SUBCUTANEOUS EVERY 4 HOURS PRN
Status: DISCONTINUED | OUTPATIENT
Start: 2024-03-27 | End: 2024-03-29

## 2024-03-27 RX ORDER — ROPIVACAINE HYDROCHLORIDE 5 MG/ML
INJECTION, SOLUTION EPIDURAL; INFILTRATION; PERINEURAL
Status: COMPLETED | OUTPATIENT
Start: 2024-03-27 | End: 2024-03-27

## 2024-03-27 RX ORDER — SODIUM CHLORIDE 0.9 % (FLUSH) 0.9 %
10 SYRINGE (ML) INJECTION
Status: DISCONTINUED | OUTPATIENT
Start: 2024-03-27 | End: 2024-03-27 | Stop reason: HOSPADM

## 2024-03-27 RX ORDER — ROCURONIUM BROMIDE 10 MG/ML
INJECTION, SOLUTION INTRAVENOUS
Status: DISCONTINUED | OUTPATIENT
Start: 2024-03-27 | End: 2024-03-27

## 2024-03-27 RX ORDER — HALOPERIDOL 5 MG/ML
0.5 INJECTION INTRAMUSCULAR EVERY 10 MIN PRN
Status: DISCONTINUED | OUTPATIENT
Start: 2024-03-27 | End: 2024-03-27 | Stop reason: HOSPADM

## 2024-03-27 RX ORDER — OXYCODONE HYDROCHLORIDE 5 MG/1
5 TABLET ORAL EVERY 6 HOURS PRN
Status: DISCONTINUED | OUTPATIENT
Start: 2024-03-27 | End: 2024-04-05 | Stop reason: HOSPADM

## 2024-03-27 RX ORDER — FENTANYL CITRATE 50 UG/ML
25 INJECTION, SOLUTION INTRAMUSCULAR; INTRAVENOUS EVERY 5 MIN PRN
Status: DISCONTINUED | OUTPATIENT
Start: 2024-03-27 | End: 2024-03-27 | Stop reason: HOSPADM

## 2024-03-27 RX ORDER — DEXAMETHASONE SODIUM PHOSPHATE 4 MG/ML
INJECTION, SOLUTION INTRA-ARTICULAR; INTRALESIONAL; INTRAMUSCULAR; INTRAVENOUS; SOFT TISSUE
Status: DISCONTINUED | OUTPATIENT
Start: 2024-03-27 | End: 2024-03-27

## 2024-03-27 RX ORDER — ONDANSETRON HYDROCHLORIDE 2 MG/ML
INJECTION, SOLUTION INTRAVENOUS
Status: DISCONTINUED | OUTPATIENT
Start: 2024-03-27 | End: 2024-03-27

## 2024-03-27 RX ORDER — LIDOCAINE HYDROCHLORIDE 20 MG/ML
INJECTION, SOLUTION EPIDURAL; INFILTRATION; INTRACAUDAL; PERINEURAL
Status: DISCONTINUED | OUTPATIENT
Start: 2024-03-27 | End: 2024-03-27

## 2024-03-27 RX ORDER — ACETAMINOPHEN 500 MG
1000 TABLET ORAL
Status: COMPLETED | OUTPATIENT
Start: 2024-03-27 | End: 2024-03-27

## 2024-03-27 RX ADMIN — ROCURONIUM BROMIDE 20 MG: 10 INJECTION, SOLUTION INTRAVENOUS at 10:03

## 2024-03-27 RX ADMIN — GABAPENTIN 300 MG: 300 CAPSULE ORAL at 09:03

## 2024-03-27 RX ADMIN — SODIUM CHLORIDE: 0.9 INJECTION, SOLUTION INTRAVENOUS at 09:03

## 2024-03-27 RX ADMIN — Medication 10 ML: at 05:03

## 2024-03-27 RX ADMIN — ROCURONIUM BROMIDE 50 MG: 10 INJECTION, SOLUTION INTRAVENOUS at 09:03

## 2024-03-27 RX ADMIN — LIDOCAINE HYDROCHLORIDE 100 MG: 20 INJECTION, SOLUTION EPIDURAL; INFILTRATION; INTRACAUDAL; PERINEURAL at 09:03

## 2024-03-27 RX ADMIN — VORICONAZOLE 200 MG: 200 TABLET, FILM COATED ORAL at 09:03

## 2024-03-27 RX ADMIN — INSULIN ASPART 2 UNITS: 100 INJECTION, SOLUTION INTRAVENOUS; SUBCUTANEOUS at 06:03

## 2024-03-27 RX ADMIN — PROPOFOL 30 MG: 10 INJECTION, EMULSION INTRAVENOUS at 12:03

## 2024-03-27 RX ADMIN — ONDANSETRON 4 MG: 2 INJECTION INTRAMUSCULAR; INTRAVENOUS at 11:03

## 2024-03-27 RX ADMIN — SUGAMMADEX 400 MG: 100 INJECTION, SOLUTION INTRAVENOUS at 12:03

## 2024-03-27 RX ADMIN — SODIUM CHLORIDE, SODIUM GLUCONATE, SODIUM ACETATE, POTASSIUM CHLORIDE, MAGNESIUM CHLORIDE, SODIUM PHOSPHATE, DIBASIC, AND POTASSIUM PHOSPHATE: .53; .5; .37; .037; .03; .012; .00082 INJECTION, SOLUTION INTRAVENOUS at 11:03

## 2024-03-27 RX ADMIN — INSULIN ASPART 1 UNITS: 100 INJECTION, SOLUTION INTRAVENOUS; SUBCUTANEOUS at 09:03

## 2024-03-27 RX ADMIN — HALOPERIDOL LACTATE 0.5 MG: 5 INJECTION, SOLUTION INTRAMUSCULAR at 12:03

## 2024-03-27 RX ADMIN — Medication 10 ML: at 06:03

## 2024-03-27 RX ADMIN — OXYCODONE 5 MG: 5 TABLET ORAL at 09:03

## 2024-03-27 RX ADMIN — FENTANYL CITRATE 100 MCG: 50 INJECTION INTRAMUSCULAR; INTRAVENOUS at 08:03

## 2024-03-27 RX ADMIN — PROPOFOL 70 MG: 10 INJECTION, EMULSION INTRAVENOUS at 09:03

## 2024-03-27 RX ADMIN — MIDAZOLAM 2 MG: 1 INJECTION INTRAMUSCULAR; INTRAVENOUS at 08:03

## 2024-03-27 RX ADMIN — DEXAMETHASONE SODIUM PHOSPHATE 4 MG: 4 INJECTION, SOLUTION INTRAMUSCULAR; INTRAVENOUS at 09:03

## 2024-03-27 RX ADMIN — ROPIVACAINE HYDROCHLORIDE 20 ML: 5 INJECTION EPIDURAL; INFILTRATION; PERINEURAL at 08:03

## 2024-03-27 RX ADMIN — ERTAPENEM 1 G: 1 INJECTION INTRAMUSCULAR; INTRAVENOUS at 10:03

## 2024-03-27 RX ADMIN — INSULIN DETEMIR 13 UNITS: 100 INJECTION, SOLUTION SUBCUTANEOUS at 09:03

## 2024-03-27 RX ADMIN — ACETAMINOPHEN 1000 MG: 500 TABLET ORAL at 08:03

## 2024-03-27 RX ADMIN — Medication 10 ML: at 01:03

## 2024-03-27 NOTE — PROGRESS NOTES
[facility] ([facility])  Vascular Surgery  Progress Note    Patient Name: Saji Castañeda  MRN: [id number]  Admission Date: 3/5/2024  Primary Care Provider: [facility] -    Subjective:     Interval History: No acute events overnight. NPO in anticipation for OR. Heparin held.     Post-Op Info:  Procedure(s) (LRB):  AMPUTATION, ABOVE KNEE (Left)   Day of Surgery     Medications:  Continuous Infusions:  Scheduled Meds:   atorvastatin  40 mg Oral Daily    celecoxib  400 mg Oral Once Pre-Op    collagenase   Topical (Top) Daily    ertapenem (INVanz) IV (PEDS and ADULTS)  1 g Intravenous Q24H    gabapentin  300 mg Oral BID    insulin aspart U-100  2-4 Units Subcutaneous with dinner    insulin aspart U-100  3-5 Units Subcutaneous with lunch    insulin aspart U-100  4-6 Units Subcutaneous with breakfast    insulin detemir U-100  13 Units Subcutaneous BID    NIFEdipine  90 mg Oral Daily    pantoprazole  40 mg Oral Daily    sodium chloride 0.9%  10 mL Intravenous Q6H    tamsulosin  0.4 mg Oral Daily    voriconazole  200 mg Oral BID     PRN Meds:acetaminophen, aluminum-magnesium hydroxide-simethicone, dextrose 10%, dextrose 10%, fentaNYL, glucagon (human recombinant), glucose, glucose, insulin aspart U-100, melatonin, midazolam, ondansetron, sodium chloride 0.9%, Flushing PICC/Midline Protocol **AND** sodium chloride 0.9% **AND** sodium chloride 0.9%, triamcinolone acetonide 0.025%     Objective:     Vital Signs (Most Recent):  Temp: 98.3 °F (36.8 °C) (03/27/24 0756)  Pulse: 84 (03/27/24 0756)  Resp: 18 (03/27/24 0756)  BP: (!) 120/56 (03/27/24 0757)  SpO2: (!) 93 % (03/27/24 0756) Vital Signs (24h Range):  Temp:  [98.3 °F (36.8 °C)-100.2 °F (37.9 °C)] 98.3 °F (36.8 °C)  Pulse:  [79-98] 84  Resp:  [17-21] 18  SpO2:  [89 %-94 %] 93 %  BP: (111-156)/(56-76) 120/56          Physical Exam  Vitals reviewed.   Constitutional:       Appearance: He is obese. He is ill-appearing.   HENT:      Head: Normocephalic.    Cardiovascular:      Rate and Rhythm: Normal rate and regular rhythm.   Musculoskeletal:         General: Swelling and deformity present.      Right lower leg: Edema present.      Left lower leg: Edema present.      Comments: s/p L Chopart amputation, healing poorly   Skin:     General: Skin is dry.      Findings: Bruising, lesion and rash present.      Comments: b/l LE swelling with thick discolored skin. Wound on plantar L foot.    Neurological:      Mental Status: Mental status is at baseline. He is disoriented.   Psychiatric:         Mood and Affect: Mood normal.          Significant Labs:  BMP:   Recent Labs   Lab 03/26/24  0413   *      K 3.9      CO2 24   BUN 19   CREATININE 1.3   CALCIUM 8.5*     CBC:   Recent Labs   Lab 03/26/24  0413   WBC 6.18   RBC 3.00*   HGB 7.4*   HCT 24.5*   *   MCV 82   MCH 24.7*   MCHC 30.2*       Significant Diagnostics:  I have reviewed all pertinent imaging results/findings within the past 24 hours.  Assessment/Plan:     * Osteomyelitis of left lower extremity  Vascular Surgery re-consulted for to discuss AKA due to LLE calcaneus OM. Patient febrile today and WBC 11.67. Xray L foot shows acute OM to calcaneus. Patient was previously not amendable to AKA but has now agreed to continue with amputation.    - Proceed to OR today for AKA  - NPO at midnight in anticipation for OR  - All other care per primary  - Vascular surgery will continue to follow             Calista Mcgrath MD  Vascular Surgery  [facility] - [facility] ([facility])

## 2024-03-27 NOTE — PROGRESS NOTES
[facility] - Telemetry Stepdown  Vascular Surgery  Consult Note     Inpatient consult to Vascular Surgery  Consult performed by: Tyra Aguero DPM  Consult ordered by: Lynette Perkins MD  Reason for consult: see below         Interval History: Patient ready for OR. Has remained NPO since midnight. Consetned and marked.       Subjective:      Chief Complaint/Reason for Admission: DKA and OM to L LE     History of Present Illness: Saji Castañeda is a 73 yo M with a history of Afib, T2D, HLD, HTN, chronic OM of L heel, s/p L TMA, PID, ESBL Klebsiella and Acinetobacter bacteremia who was admitted after being found down at home and DKA.     Vascular Surgery was re-consulted to discuss option of an AKA due to LLE calcaneaus OM. During his previous admission in May 2023 Vascular surgery was consulted and recommended an AKA which he was not amendable to at that time. We were consulted on 3/7 for the same complaint and patient continued to not be amendable to amputation. Today patient was febrile and WBC 11.76. Patient is somnolent and weens in and out of sleep. Patient is non-ambulatory and mostly in wheelchair. Family is concerned if patient will be able to walk after amputation and asks if there are options for prosthetics. After discussing with the patient that amputation is the gold stand for infection source control the patient is now amendable for an AKA. Family members (Karen-niece, Kandy-sister) are bedside and also agree with continuing with amputation     Febrile, WBC 11.76, Glucose 183. US LE Venous studies shows DVT of the R brachial vein surrounding venous line only. L LE Xrays from 10/3/23 shows acute OM of the posterior aspect of the calcaneus.              Medications Prior to Admission   Medication Sig Dispense Refill Last Dose    acetaminophen (TYLENOL) 325 MG tablet Take 650 mg by mouth every 6 (six) hours as needed for Temperature greater than.     Unknown    acidophilus-pectin, citrus 100 million  "cell-10 mg Cap Take 1 capsule by mouth 2 (two) times a day.     Unknown    ascorbic acid, vitamin C, (VITAMIN C) 500 MG tablet Take 500 mg by mouth 2 (two) times daily.     Unknown    B COMPLEX-VITAMIN B12 tablet Take 1 tablet by mouth once daily.     Unknown    BD AUTOSHIELD DUO PEN NEEDLE 30 gauge x 3/16" Ndle       Unknown    BD ULTRA-FINE ADITYA PEN NEEDLE 32 gauge x 5/32" Ndle USE FOUR TIMES DAILY WITH MEALS AND NIGHTLY 100 each 0 Unknown    blood sugar diagnostic (CONTOUR TEST STRIPS) Strp Inject 1 strip into the skin 2 (two) times daily before meals. 100 strip 6 Unknown    clopidogreL (PLAVIX) 75 mg tablet Take 1 tablet (75 mg total) by mouth once daily. 60 tablet 0      gabapentin (NEURONTIN) 300 MG capsule Take 300 mg by mouth 2 (two) times daily.     Unknown    insulin aspart U-100 (NOVOLOG) 100 unit/mL (3 mL) InPn pen Inject 6 Units into the skin 3 (three) times daily with meals. 30 mL 3 Unknown    MICROLET LANCET Misc     0 Unknown    multivitamin (THERAGRAN) per tablet Take 1 tablet by mouth once daily.     Unknown    NIFEdipine (PROCARDIA-XL) 60 MG (OSM) 24 hr tablet Take 1 tablet (60 mg total) by mouth once daily. 30 tablet 2 Unknown    pantoprazole (PROTONIX) 40 MG tablet Take 40 mg by mouth once daily. Before breakfast     Unknown    pen needle, diabetic 32 gauge x 5/32" Ndle use as directed with insulin 100 each 2 Unknown    rosuvastatin (CRESTOR) 40 MG Tab Take 40 mg by mouth once daily.     Unknown    tamsulosin (FLOMAX) 0.4 mg Cap Take 0.4 mg by mouth once daily.     Unknown    triamcinolone acetonide 0.025% (KENALOG) 0.025 % Oint Apply topically 2 (two) times daily as needed (to affected area).     Unknown    [DISCONTINUED] apixaban (ELIQUIS) 5 mg Tab Take 1 tablet (5 mg total) by mouth 2 (two) times daily. 60 tablet 0 Unknown    [DISCONTINUED] diphenhydrAMINE (BENADRYL) 25 mg capsule No directions listed on the patients medication list.     Unknown    [DISCONTINUED] furosemide (LASIX) 40 MG " tablet Take 20 mg by mouth.     Unknown    [DISCONTINUED] glipiZIDE (GLUCOTROL) 10 MG tablet Take 20 mg by mouth 2 (two) times a day.     Unknown    [DISCONTINUED] hydrALAZINE (APRESOLINE) 100 MG tablet Take 1 tablet (100 mg total) by mouth every 8 (eight) hours. 90 tablet 11 Unknown    [DISCONTINUED] isosorbide dinitrate (ISORDIL) 10 MG tablet Take 1 tablet (10 mg total) by mouth 3 (three) times daily. 90 tablet 11      [DISCONTINUED] LANTUS SOLOSTAR U-100 INSULIN glargine 100 units/mL SubQ pen Inject 45 Units into the skin every evening. (Patient taking differently: Inject 45 Units into the skin once daily.)   0 Unknown    [DISCONTINUED] miconazole NITRATE 2 % (MICOTIN) 2 % top powder Apply topically 2 (two) times daily. 20 g 0 Unknown    [DISCONTINUED] oxyCODONE 5 mg TbOr Take 1 tablet by mouth every 6 (six) hours as needed (Pain (Max 5 daily)).     Unknown    [DISCONTINUED] sodium hypochlorite 0.5 % (DAKIN'S SOLUTION) external solution Apply topically once daily.     Unknown         Review of patient's allergies indicates:  No Known Allergies          Past Medical History:   Diagnosis Date    Arthritis       legs    Bacteremia due to Gram-negative bacteria 3/23/2021    Diabetes mellitus      Diabetes mellitus, type 2      Hyperlipidemia      Hypertension      Osteomyelitis      Palliative care encounter 5/24/2023            Past Surgical History:   Procedure Laterality Date    ANGIOGRAPHY OF LOWER EXTREMITY N/A 2/3/2021     Procedure: Angiogram Extremity Bilateral;  Surgeon: Ernst Chacko MD;  Location: [address];  Service: Peripheral Vascular;  Laterality: N/A;  7.4 mintues fluoroscopy time  816.15 mGy  170.17 Gycm2    AORTOGRAPHY WITH EXTREMITY RUNOFF Bilateral 2/3/2021     Procedure: AORTOGRAM, WITH EXTREMITY RUNOFF;  Surgeon: Ernst Chacko MD;  Location: [facility] OR [address] [place] [address];  Service: Peripheral Vascular;  Laterality: Bilateral;    DEBRIDEMENT OF FOOT Left 2/23/2021     Procedure:  DEBRIDEMENT, LEFT HEEL;  Surgeon: Mayra Schroeder DPM;  [facility];  Service: Podiatry;  Laterality: Left;    FOOT AMPUTATION   October 2010     left high midfoot amputation    IMPLANTATION OF LEADLESS PACEMAKER N/A 10/12/2023     Procedure: [id number];  Surgeon: VANESSA Delatorre MD;  Location: [facility] EP LAB;  Service: Cardiology;  Laterality: N/A;  AVB, MICRA, EH, ANES, RM [id number]      Family History         Problem Relation (Age of Onset)     Cancer Brother     Diabetes Mother, Sister     Heart disease Mother     Stroke Sister                   Tobacco Use    Smoking status: Never    Smokeless tobacco: Never   Substance and Sexual Activity    Alcohol use: No       Comment: occassional    Drug use: No    Sexual activity: Not Currently      Review of Systems   Constitutional:  Positive for fatigue and fever.   HENT: Negative.     Eyes: Negative.    Respiratory: Negative.     Cardiovascular: Negative.    Gastrointestinal: Negative.    Endocrine: Negative.    Genitourinary: Negative.    Skin:  Positive for color change and wound.        Reports wound and previous amputation of L foot   Allergic/Immunologic: Negative.    Hematological: Negative.    Psychiatric/Behavioral: Negative.        Objective:      Vital Signs (Most Recent):  Temp: 99 °F (37.2 °C) (03/19/24 1200)  Pulse: 81 (03/19/24 1145)  Resp: (!) 24 (03/19/24 1145)  BP: (!) 138/57 (03/19/24 1145)  SpO2: 100 % (03/19/24 1145) Vital Signs (24h Range):  Temp:  [98.1 °F (36.7 °C)-101.7 °F (38.7 °C)] 99 °F (37.2 °C)  Pulse:  [66-91] 81  Resp:  [16-24] 24  SpO2:  [86 %-100 %] 100 %  BP: (116-138)/(44-70) 138/57      Weight: 120.2 kg (264 lb 15.9 oz)  Body mass index is 39.13 kg/m².      Physical Exam  Constitutional:       Appearance: He is obese. He is ill-appearing.   HENT:      Head: Normocephalic.   Cardiovascular:      Rate and Rhythm: Normal rate and regular rhythm.   Musculoskeletal:         General: Swelling and  deformity present.      Right lower leg: Edema present.      Left lower leg: Edema present.      Comments: s/p L Chopart amputation, healing poorly   Skin:     General: Skin is dry.      Findings: Bruising, lesion and rash present.      Comments: b/l LE swelling with thick discolored skin. Wound on plantar L foot.    Neurological:      Mental Status: Mental status is at baseline. He is disoriented.   Psychiatric:         Mood and Affect: Mood normal.            Significant Labs:  BMP:        Recent Labs   Lab 03/18/24  0608 03/19/24  0752   * 183*   * 141   K 4.8 4.9    105   CO2 24 28   BUN 29* 28*   CREATININE 1.9* 1.8*   CALCIUM 8.2* 8.5*   MG 1.9  --       CBC:       Recent Labs   Lab 03/19/24  0752   WBC 11.67   RBC 3.36*   HGB 8.2*   HCT 27.3*   *   MCV 81*   MCH 24.4*   MCHC 30.0*      Significant Diagnostics:  I have reviewed and interpreted all pertinent imaging results/findings within the past 24 hours.  Assessment/Plan:      * Osteomyelitis of left lower extremity  Vascular Surgery re-consulted for to discuss AKA due to LLE calcaneus OM. Patient febrile today and WBC 11.67. Xray L foot shows acute OM to calcaneus. Patient was previously not amendable to AKA but has now agreed to continue with amputation.     - Plan for AKA Today 3/27       - NPO at midnight before OR  - Patient marked and consented for surgery  - All other care per primary  - Vascular surgery will continue to follow

## 2024-03-27 NOTE — OP NOTE
Vascular Surgery  Operative Note    DATE: 3/27/2024    PREOPERATIVE DIAGNOSIS: Osteomyelitis of left lower extremity [M86.9]     POSTOPERATIVE DIAGNOSIS: Osteomyelitis of left lower extremity [M86.9]     Procedure(s):  AMPUTATION, ABOVE KNEE     Surgeon(s) and Role:     * Adal Arellano MD - Primary     * Will Davis MD    ANESTHESIA: General endotracheal anesthesia    FINDINGS: LLE AKA without issue.     INDICATION: Mr. Castañeda is a 74 y.o. male was seen by vascular surgery and found to have continued osteomyelitis after TMA. He continued to have fevers.  . Based on this surgery was indicated and we recommended Left AKA for source control.  We discussed the procedure including postoperative course. he agreed to proceed. Mr. Castañeda signed the informed consent and expressed understanding of the risks and benefits of surgery.     PROCEDURE IN DETAIL: Patient was brought to the operating room where he was placed in supine position on the operating room table and underwent general anesthesia with endotracheal intubation without complication. He also received a loca block prior to the case. The field was sterilely prepped out and draped in standard fashion, and a timeout identifying the correct patient, placement, procedure, and preoperative antibiotics was called with everyone in agreement.    We kana out a fish mouth 1 hands breath about the knee joint. and made  incision was made using a #10 blade. Using electrocautery we dissected through the subcutaneus tissues, fascia and muscle down to the bone. The bone was freed circumferentially. Using a bone saw we came through the femur without issue. We used the saw to smooth out the edges as well. We turned our attention to neurovascular bundle. Using Metzenbaum scissors the artery, vein and nerve were dissected out free from each out and clamped on proximally, distally and then divided. During this portion of the case there was some blood loss as we  attempted to fully control the vasculature. The specimen portion was tied off with silk. . The proximal portion vessels were stick tied using silk with good control and the nerve was tied off with silk after being put on stretch. We then used the amputation knife to amputate the specimen and it was removed from the field. We proceeded obtain hemostasis using a combination of suture ligation and electrocautery with good result.  The sciatic nerve was then put on stretch, clamped, tied off with silk and divided. Our field was irrigated with saline and there was still adequate hemostasis. A 15Fr flo drain was placed at this point and secured with nylon suture. We proceeded with closure at this point in multiple layers. We closed deep layers over the bone with interrupted 2-0 vicryl. The fascia was closed with interrupted 2-0 vicryl.Subcutaneous tissues were approximated with interrupted 3-0 Vicryl. Skin was closed with a skin stapler. A sterile dressing was then applied that included xeroform, 4x4 gauze, ABD pad, kerlix, ace wrap and Ioban.             This completed the proposed operation. All instruments, needles, and sponge counts were reported as correct x2 by the nursing staff. Patient was extubated and awakened from general anesthesia without complication. He was sent to the recovery unit in stable condition.     Dr. Arellano was present or avilable for the entire case.     EBL: 100mL.    COMPLICATIONS: none apparent.    SPECIMEN: Left AKA    DRAINS: 15 Jordanian bulb drain     DISPOSITION: PACU.

## 2024-03-27 NOTE — SUBJECTIVE & OBJECTIVE
Interval History: Patient was in OR this morning. Ok to restart heparin tomorrow and eliquis the day after. Labs pending post op. BG in good range.     Review of Systems  Objective:     Vital Signs (Most Recent):  Temp: 98.2 °F (36.8 °C) (03/27/24 1515)  Pulse: 86 (03/27/24 1515)  Resp: 18 (03/27/24 1515)  BP: (!) 113/59 (03/27/24 1515)  SpO2: 95 % (03/27/24 1700) Vital Signs (24h Range):  Temp:  [97 °F (36.1 °C)-100.2 °F (37.9 °C)] 98.2 °F (36.8 °C)  Pulse:  [79-90] 86  Resp:  [15-20] 18  SpO2:  [89 %-100 %] 95 %  BP: (113-156)/(55-74) 113/59     Weight: 120.2 kg (264 lb 15.9 oz)  Body mass index is 39.13 kg/m².    Intake/Output Summary (Last 24 hours) at 3/27/2024 1808  Last data filed at 3/27/2024 1321  Gross per 24 hour   Intake 1589.58 ml   Output 115 ml   Net 1474.58 ml         Physical Exam  Constitutional:       General: He is not in acute distress.     Appearance: Normal appearance. He is well-developed. Ill appearance: chronically ill appearing.   HENT:      Head: Normocephalic and atraumatic.      Right Ear: External ear normal.      Left Ear: External ear normal.      Nose: Nose normal.   Eyes:      General:         Right eye: No discharge.         Left eye: No discharge.      Conjunctiva/sclera: Conjunctivae normal.   Cardiovascular:      Rate and Rhythm: Normal rate and regular rhythm.      Pulses: Normal pulses.   Pulmonary:      Effort: Pulmonary effort is normal. No respiratory distress.      Breath sounds: No stridor.   Abdominal:      General: Abdomen is flat.      Palpations: Abdomen is soft.      Tenderness: There is no abdominal tenderness.   Musculoskeletal:         General: Swelling and deformity present.      Cervical back: Normal range of motion.      Comments: L-foot partial amputation present  RUE swelling d/t DVT   Skin:     General: Skin is warm and dry.   Neurological:      General: No focal deficit present.      Mental Status: He is alert.             Significant Labs: All pertinent  labs within the past 24 hours have been reviewed.  CBC:   Recent Labs   Lab 03/26/24  0413   WBC 6.18   HGB 7.4*   HCT 24.5*   *     CMP:   Recent Labs   Lab 03/26/24  0413      K 3.9      CO2 24   *   BUN 19   CREATININE 1.3   CALCIUM 8.5*   PROT 6.9   ALBUMIN 1.7*   BILITOT 0.2   ALKPHOS 85   AST 26   ALT 9*   ANIONGAP 11     POCT Glucose:   Recent Labs   Lab 03/27/24  0758 03/27/24  1242 03/27/24  1600   POCTGLUCOSE 132* 174* 251*       Significant Imaging: I have reviewed all pertinent imaging results/findings within the past 24 hours.

## 2024-03-27 NOTE — CARE UPDATE
- Plan for L AKA tomorrow 3/27  - Patient seen bedside today  - Consented with family via phone and nurse as witness  - L leg marked  - Hold heparin at midnight  - NPO at midnight in anticipation for OR  - Please reach out with any further question    Tyra Aguero DPM   Vascular Surgery  [facility]

## 2024-03-27 NOTE — PROGRESS NOTES
[facility] - Telemetry [facility] Medicine  Progress Note    Patient Name: Saji Castañeda  MRN: [id number]  Patient Class: IP- Inpatient   Admission Date: 3/5/2024  Length of Stay: 22 days  Attending Physician: Mahendra Collins III*  Primary Care Provider: [facility] -        Subjective:     Principal Problem:Osteomyelitis of left lower extremity        HPI:  Saji Castañeda is a 74 y.o. male with a medical history of DM2, HLD, HTN, chronic osteomyelitis of L heel, L TMA, PAD, hx of ESBL klebsiella, acinetobacter bacteremia, presenting with hyperglycemia. He presented to the ED via EMS and his POCT glucose on arrival was >500. Serum blood glucose 667 with anion gap of 17 and elevated ketones. Serum potassium 3.4, corrected sodium 149. Urinalysis significant for RBCs, glucosuria, and moderate bacteria and yeast. CBC revealed leukocytosis, elevated platelets, and mild anemia. CMP shows Na 135, K 3.4, BUN 29, Cr 2.1, Glucose 677, Lactate 4.61. BNP and troponin elevated. GFR 32.2. Insulin drip, IV fluids, zosyn, vancomycin, and potassium given.      He is unable to provide much history, but states that he has not been taking his home medications for at least a week. He reports shortness of breath, fatigue, and weakness for the last 6-7 days. He has chills and reports episodes of emesis. He denies abdominal pain, chest pain, palpitations, recent illness/infection, fevers, changes to bowel movements and urinary output. Patient lives with his brother and sister at home. He was last seen in the ED on 2/21 after a fall. He was hypoglycemic BGL 60 at that time which returned to normal after eating. He was also scheduled for a wound clinic appointment today 3/5 at [facility] with Dr. Wilson.        Overview/Hospital Course:  Wound care and cultures collected by Podiatry with recs for Vascular consult for amputation. Patient declined amputation. ID recommending IV Ertapenem for chronic osteomyelitis for 6 week  duration (EED: 4/15/24). With high wound care needs and IV antibiotics plan patient needing long-term placement. He is being treated with topical permethrin for bedbugs. Wound care has been dressing the wounds. 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 Still adjusting insulin to control sugars better. Giving fluids for YNES. Poor PO intake even with feeding assistance. LTAC authorization denied by [employer]. Waiting for SNF placement ([facility], awaiting auth and family to complete paperwork). Patient started spiking fever again and infectious work up done. ID re-consulted. Patient has been counseled multiple times that source control cannot be achieved without amputation. Patient agreeable to AKA scheduled for 3/27. Transitioned to heparin from eliquis. Fung placed for urinary rentention. Discussed new hematuria with urology, hematuria resolved with repositioning of fung. Pt to receive 1u PRBC preoperatively per vascular surgery. S/p L AKA on 3/27 with vascular surgery. ID re consulted for final OPAT.       Interval History: Patient was in OR this morning. Ok to restart heparin tomorrow and eliquis the day after. Labs pending post op. BG in good range.     Review of Systems  Objective:     Vital Signs (Most Recent):  Temp: 98.2 °F (36.8 °C) (03/27/24 1515)  Pulse: 86 (03/27/24 1515)  Resp: 18 (03/27/24 1515)  BP: (!) 113/59 (03/27/24 1515)  SpO2: 95 % (03/27/24 1700) Vital Signs (24h Range):  Temp:  [97 °F (36.1 °C)-100.2 °F (37.9 °C)] 98.2 °F (36.8 °C)  Pulse:  [79-90] 86  Resp:  [15-20] 18  SpO2:  [89 %-100 %] 95 %  BP: (113-156)/(55-74) 113/59     Weight: 120.2 kg (264 lb 15.9 oz)  Body mass index is 39.13 kg/m².    Intake/Output Summary (Last 24 hours) at 3/27/2024 1808  Last data filed at 3/27/2024 1321  Gross per 24 hour   Intake 1589.58 ml   Output 115 ml   Net 1474.58 ml         Physical Exam  Constitutional:       General: He is not in acute  distress.     Appearance: Normal appearance. He is well-developed. Ill appearance: chronically ill appearing.   HENT:      Head: Normocephalic and atraumatic.      Right Ear: External ear normal.      Left Ear: External ear normal.      Nose: Nose normal.   Eyes:      General:         Right eye: No discharge.         Left eye: No discharge.      Conjunctiva/sclera: Conjunctivae normal.   Cardiovascular:      Rate and Rhythm: Normal rate and regular rhythm.      Pulses: Normal pulses.   Pulmonary:      Effort: Pulmonary effort is normal. No respiratory distress.      Breath sounds: No stridor.   Abdominal:      General: Abdomen is flat.      Palpations: Abdomen is soft.      Tenderness: There is no abdominal tenderness.   Musculoskeletal:         General: Swelling and deformity present.      Cervical back: Normal range of motion.      Comments: L-foot partial amputation present  RUE swelling d/t DVT   Skin:     General: Skin is warm and dry.   Neurological:      General: No focal deficit present.      Mental Status: He is alert.             Significant Labs: All pertinent labs within the past 24 hours have been reviewed.  CBC:   Recent Labs   Lab 03/26/24  0413   WBC 6.18   HGB 7.4*   HCT 24.5*   *     CMP:   Recent Labs   Lab 03/26/24  0413      K 3.9      CO2 24   *   BUN 19   CREATININE 1.3   CALCIUM 8.5*   PROT 6.9   ALBUMIN 1.7*   BILITOT 0.2   ALKPHOS 85   AST 26   ALT 9*   ANIONGAP 11     POCT Glucose:   Recent Labs   Lab 03/27/24  0758 03/27/24  1242 03/27/24  1600   POCTGLUCOSE 132* 174* 251*       Significant Imaging: I have reviewed all pertinent imaging results/findings within the past 24 hours.    Assessment/Plan:      * Osteomyelitis of left lower extremity  Patient with Proteus and enterobacter on leg cultures collected by Podiatry with concern for acute on chronic osteo. Fungal culture grew fusarium. He has started spiking fever and still is not agreeable to amputation, will  do infectious work up to look out for source.    -UA noninfectious   -Blood culture NGTD  - Inpatient consult to ID and vascular surgery who plans to do AKA on 3/27.  - IV ertapenem with end date 4/15/24   - Added voriconazole for fusarium coverage which grew on fungal culture - 3 months  - midline catheters placed on admission for difficult IV access      Urinary retention  Patient developed new urinary retention.     -Fung's placed   -Flomax  -Renal ultrasound showed no hydronephrosis  - Making adequate urine with fung, c/f hematuria which developed 3/25 and resolved with repositioning of fung. See urology note from same date  - voiding trial prior to DC. If unable to void, urology f/u outpatient       BPH (benign prostatic hyperplasia)  Fung in placed, placed by urology  Flomax daily      Localized swelling of right upper extremity  Patient has swelling and pain in the right arm and some swelling in the left arm as well    -US b/l upper extremity for DVT showed clot, eliquis transitioned to Heparin while patient is pre-op     History of Bedbug bite with infection  Patient started on Permethrin daily for 5 days starting 3/12      Proteus infection    On ertapenem to cover proteus and enterobacter from left leg wound cx    Chronic anticoagulation  - See DVT      History of amputation of left high mid foot   See osteo      Stage 3b chronic kidney disease  Creatine stable for now. BMP reviewed- noted Estimated Creatinine Clearance: 62.8 mL/min (based on SCr of 1.3 mg/dL). according to latest data. Based on current GFR, CKD stage is stage 3 - GFR 30-59.  Monitor UOP and serial BMP and adjust therapy as needed. Renally dose meds. Avoid nephrotoxic medications and procedures.    Severe sepsis  This patient does have evidence of infective focus  My overall impression is sepsis.  Source: Skin and Soft Tissue (location left leg)  Antibiotics given-   Antibiotics (72h ago, onward)      Start     Stop Route Frequency  "Ordered    03/08/24 1230  ertapenem (INVANZ) 1 g in sodium chloride 0.9 % 100 mL IVPB (MB+)         -- IV Every 24 hours (non-standard times) 03/08/24 1115          Latest lactate reviewed-  No results for input(s): "LACTATE", "POCLAC" in the last 72 hours.    Organ dysfunction indicated by Acute kidney injury    Fluid challenge Actual Body weight- Patient will receive 30ml/kg actual body weight to calculate fluid bolus for treatment of septic shock.     Post- resuscitation assessment No - Post resuscitation assessment not needed       Will Not start Pressors-     - See osteo      Other hyperlipidemia  Continue home atorvastatin      Diabetic ketoacidosis without coma associated with type 2 diabetes mellitus  Patient's FSGs are uncontrolled due to hyperglycemia on current medication regimen.  Last A1c reviewed-   Lab Results   Component Value Date    HGBA1C >14.0 (H) 03/05/2024     Most recent fingerstick glucose reviewed-   Recent Labs   Lab 03/25/24  1726 03/25/24  2057 03/26/24  0804 03/26/24  1213   POCTGLUCOSE 288* 196* 158* 200*       Current correctional scale   LD SSI  Maintain anti-hyperglycemic dose as follows-   Antihyperglycemics (From admission, onward)      Start     Stop Route Frequency Ordered    03/24/24 2100  insulin detemir U-100 (Levemir) pen 13 Units         -- SubQ 2 times daily 03/24/24 0929    03/24/24 0715  insulin aspart U-100 pen 4-6 Units         -- SubQ with breakfast 03/23/24 0913    03/20/24 1645  insulin aspart U-100 pen 2-4 Units         -- SubQ with dinner 03/20/24 0644    03/20/24 1130  insulin aspart U-100 pen 3-5 Units         -- SubQ with lunch 03/20/24 0644    03/11/24 1521  insulin aspart U-100 pen 0-5 Units         -- SubQ Before meals & nightly PRN 03/11/24 1520          Hold Oral hypoglycemics while patient is in the hospital.  Will keep patient on DKA pathway with insulin drip and fluid with protocol in place for switching to subcutaneous insulin as anion gap " closes.      DKA    - Resolved  - Continue insulin regimen and BG checks TIDWM and QHS and 2am  - patient tends to become hypoglycemic around dinnertime  - could be insulin stacking from breakfast and lunch insulin aspart given poor PO intake despite assistance with feeding   - detemir 14 unit BID  - aspart scheduled w/ only breakfast and lunch and dinner with different units  -Inpatient consult to Endocrine    Paroxysmal atrial fibrillation  Patient with Persistent (7 days or more) atrial fibrillation which is uncontrolled currently with    . Patient is currently in sinus rhythm.[id number] Score: 4. . Anticoagulation indicated. Anticoagulation done with heparin/eliquis .    -heparin one day post op, then eliquis on POD 2    Chronic deep vein thrombosis (DVT) of right upper extremity  - eliquis transitioned to Heparin which was paused temporarily d/t concern of new hematuria which developed 3/25. Discussed with urology, the hematuria resolved with repositioning of the fung. Heparin resumed, but will temporarily be paused as patient needs preoperative blood transfusion and has minimal IV access d/t placement difficulties & limb alert on R-arm d/t DVT      - heparin to restart on 3/28, then transition to eliquis if no bleeding       Iron deficiency anemia  Patient's anemia is currently uncontrolled. Has received 2 units of PRBCs on 3/23 and 3/16 . Etiology likely d/t chronic blood loss and Iron deficiency. Receiving 1u on 3/26 preoperatively    Current CBC reviewed-   Lab Results   Component Value Date    HGB 7.4 (L) 03/26/2024    HCT 24.5 (L) 03/26/2024     Monitor serial CBC and transfuse if patient becomes hemodynamically unstable, symptomatic or H/H drops below 7/21.    Cellulitis of left lower leg  See osteo      Peripheral arterial disease  Resume plavix post op      YNES (acute kidney injury)  Resolved    Patient with acute kidney injury/acute renal failure likely due to pre-renal azotemia due to dehydration  and post-obstructive d/t urinary retention  YNES is currently stable. Baseline creatinine  1.1  - Labs reviewed- Renal function/electrolytes with Estimated Creatinine Clearance: 62.8 mL/min (based on SCr of 1.3 mg/dL). according to latest data. Monitor urine output and serial BMP and adjust therapy as needed. Avoid nephrotoxins and renally dose meds for GFR listed above.    Creatinine 2.1 on admit, baseline around 1.1  - prerenal vs obstructive (fung placed)     Lab Results   Component Value Date    CREATININE 1.3 03/26/2024       Plan:   - Strict I&Os and daily weights   - Daily BMP  - Trend sCr  - Avoid nephrotoxic agents such as NSAIDs, ACEi, ARBs and IV radiocontrast.  - Renally dose meds to current GFR.  - Maintain MAP > 65.  - No indications for HD at this time.   - Maintain electrolytes at goal: Mg > 2, Phos > 3, K > 4.           Insulin dependent type 2 diabetes mellitus  Patient's FSGs are uncontrolled due to hyperglycemia on current medication regimen.  Last A1c reviewed-   Lab Results   Component Value Date    HGBA1C >14.0 (H) 03/05/2024     Most recent fingerstick glucose reviewed-   Recent Labs   Lab 03/27/24  0736 03/27/24  0758 03/27/24  1242 03/27/24  1600   POCTGLUCOSE 130* 132* 174* 251*       Current correctional scale  Low  Increase anti-hyperglycemic dose as follows-   Antihyperglycemics (From admission, onward)      Start     Stop Route Frequency Ordered    03/27/24 1015  insulin aspart U-100 pen 0-5 Units         -- SubQ Every 4 hours PRN 03/27/24 0908    03/24/24 2100  insulin detemir U-100 (Levemir) pen 13 Units         -- SubQ 2 times daily 03/24/24 0929    03/24/24 0715  insulin aspart U-100 pen 4-6 Units         -- SubQ with breakfast 03/23/24 0913    03/20/24 1645  insulin aspart U-100 pen 2-4 Units         -- SubQ with dinner 03/20/24 0644    03/20/24 1130  insulin aspart U-100 pen 3-5 Units         -- SubQ with lunch 03/20/24 0644          Hold Oral hypoglycemics while patient is in  the hospital.    - Endocrine's following an making insulin adjustments as needed.    Essential hypertension  Chronic, uncontrolled. Latest blood pressure and vitals reviewed-     Temp:  [97.9 °F (36.6 °C)-98.5 °F (36.9 °C)]   Pulse:  [81-98]   Resp:  [16-21]   BP: (111-147)/(62-76)   SpO2:  [74 %-95 %] .   Home meds for hypertension were reviewed and noted below.   Hypertension Medications               furosemide (LASIX) 40 MG tablet Take 20 mg by mouth.    hydrALAZINE (APRESOLINE) 100 MG tablet Take 1 tablet (100 mg total) by mouth every 8 (eight) hours.    isosorbide dinitrate (ISORDIL) 10 MG tablet Take 1 tablet (10 mg total) by mouth 3 (three) times daily.    NIFEdipine (PROCARDIA-XL) 60 MG (OSM) 24 hr tablet Take 1 tablet (60 mg total) by mouth once daily.            While in the hospital, will manage blood pressure as follows; Adjust home antihypertensive regimen as follows- will keep nifedipine - increased to 90     Will utilize p.r.n. blood pressure medication only if patient's blood pressure greater than 180/110 and he develops symptoms such as worsening chest pain or shortness of breath.      VTE Risk Mitigation (From admission, onward)           Ordered     heparin 25,000 units in dextrose 5% 250 mL (100 units/mL) infusion HIGH INTENSITY nomogram - [facility]  Continuous        Question:  Begin at (units/kg/hr)  Answer:  18    03/20/24 0747                    Discharge Planning   OXANA: 3/29/2024     Code Status: Full Code   Is the patient medically ready for discharge?: No    Reason for patient still in hospital (select all that apply): Patient trending condition, Laboratory test, Treatment, Consult recommendations, and Pending disposition  Discharge Plan A: Skilled Nursing Facility   Discharge Delays: None known at this time              Milagros Nicolas MD  Department of Hospital Medicine   [facility] - Telemetry Stepdown

## 2024-03-27 NOTE — PROGRESS NOTES
Pt has a leadless Medtronic PM inserted. On preop PM was not captured via monitor or able to palpate under skin. PM clinic was notified spoke with Mckenzie. Pt PM is not being followed by clinic. AN and OR team is aware of PM situation. Pt was taken to OR for procedure.

## 2024-03-27 NOTE — PLAN OF CARE
Problem: Adult Inpatient Plan of Care  Goal: Plan of Care Review  Outcome: Ongoing, Progressing  Goal: Patient-Specific Goal (Individualized)  Outcome: Ongoing, Progressing  Goal: Absence of Hospital-Acquired Illness or Injury  Outcome: Ongoing, Progressing  Goal: Optimal Comfort and Wellbeing  Outcome: Ongoing, Progressing  Goal: Readiness for Transition of Care  Outcome: Ongoing, Progressing     Problem: Diabetes Comorbidity  Goal: Blood Glucose Level Within Targeted Range  Outcome: Ongoing, Progressing     Problem: Adjustment to Illness (Sepsis/Septic Shock)  Goal: Optimal Coping  Outcome: Ongoing, Progressing     Problem: Bleeding (Sepsis/Septic Shock)  Goal: Absence of Bleeding  Outcome: Ongoing, Progressing     Problem: Glycemic Control Impaired (Sepsis/Septic Shock)  Goal: Blood Glucose Level Within Desired Range  Outcome: Ongoing, Progressing     Problem: Infection Progression (Sepsis/Septic Shock)  Goal: Absence of Infection Signs and Symptoms  Outcome: Ongoing, Progressing     Problem: Nutrition Impaired (Sepsis/Septic Shock)  Goal: Optimal Nutrition Intake  Outcome: Ongoing, Progressing     Problem: Infection  Goal: Absence of Infection Signs and Symptoms  Outcome: Ongoing, Progressing     Problem: Impaired Wound Healing  Goal: Optimal Wound Healing  Outcome: Ongoing, Progressing     Problem: Skin Injury Risk Increased  Goal: Skin Health and Integrity  Outcome: Ongoing, Progressing     Problem: Fall Injury Risk  Goal: Absence of Fall and Fall-Related Injury  Outcome: Ongoing, Progressing     Problem: Fluid and Electrolyte Imbalance (Acute Kidney Injury/Impairment)  Goal: Fluid and Electrolyte Balance  Outcome: Ongoing, Progressing     Problem: Oral Intake Inadequate (Acute Kidney Injury/Impairment)  Goal: Optimal Nutrition Intake  Outcome: Ongoing, Progressing     Problem: Renal Function Impairment (Acute Kidney Injury/Impairment)  Goal: Effective Renal Function  Outcome: Ongoing, Progressing      Patient had an left AKA with a EDMUNDO drain placed. Patient's heparin drip has been discontinued. Patient diet order reinstated from NPO; patient was able to eat dinner. Patent's vitals are stable. No complaints of pain currently.

## 2024-03-27 NOTE — ASSESSMENT & PLAN NOTE
Patient with Persistent (7 days or more) atrial fibrillation which is uncontrolled currently with    . Patient is currently in sinus rhythm.[id number] Score: 4. . Anticoagulation indicated. Anticoagulation done with heparin/eliquis .    -heparin one day post op, then eliquis on POD 2

## 2024-03-27 NOTE — PROGRESS NOTES
"[facility] - Surgery (2nd Fl)  Adult Nutrition  Progress Note    SUMMARY       Recommendations    Resume Diabetic diet + Boost Glucose Control ONS when able.  RD to monitor & follow-up.    Goals: Meet % EEN, EPN by RD f/u date  Nutrition Goal Status: progressing towards goal  Communication of RD Recs: reviewed with RN    Assessment and Plan    Nutrition Problem:  Excessive CHO intake     Related to (etiology):   Food and nutrition related knowledge deficit      Signs and Symptoms (as evidenced by):   A1C >14     Interventions(treatment strategy):  Collaboration of nutrition care w/ other providers      Nutrition Diagnosis Status:   Continues    Reason for Assessment    Reason For Assessment: RD follow-up  Diagnosis: other (see comments) (Osteomyelitis)  Relevant Medical History: DM  Interdisciplinary Rounds: did not attend    General Information Comments: FRANKIE/NPO for LGisselle AKA. Prior to NPO status, pt tolerating diet + ONS w/ "fair" PO intake (per RN documentation). PTA - pt w/ good appetite & stable weight. Diabetic diet education complete 3/6.   Nutrition Discharge Planning: Adequate PO intake    Nutrition/Diet History    Patient Reported Diet/Restrictions/Preferences: general  Spiritual, Cultural Beliefs, Hinduism Practices, Values that Affect Care: no  Factors Affecting Nutritional Intake: NPO    Anthropometrics    Temp: 98.3 °F (36.8 °C)  Height Method: Stated  Height: 5' 9" (175.3 cm)  Height (inches): 69 in  Weight Method: Stated  Weight: 120.2 kg (264 lb 15.9 oz)  Weight (lb): 265 lb  Ideal Body Weight (IBW), Male: 160 lb  % Ideal Body Weight, Male (lb): 165.63 %  BMI (Calculated): 39.1  BMI Grade: 35 - 39.9 - obesity - grade II    Lab/Procedures/Meds    Pertinent Labs Reviewed: reviewed  Pertinent Labs Comments: A1C >14  Pertinent Medications Reviewed: reviewed  Pertinent Medications Comments: Statin    Estimated/Assessed Needs    Weight Used For Calorie Calculations: 120.2 kg (264 lb 15.9 oz)    Energy " Calorie Requirements (kcal): 1927 kcal/d  Energy Need Method: [place]-St Jeor (1.0 PAL)    Protein Requirements: 108 g/d (.9 g/kg)  Weight Used For Protein Calculations: 120.2 kg (264 lb 15.9 oz)    Estimated Fluid Requirement Method: other (see comments) (Per MD)  RDA Method (mL): 1927    Nutrition Prescription Ordered    Current Diet Order: NPO  Nutrition Order Comments: Was on Diabetic diet + Boost Glucose Control ONS & tolerating.    Evaluation of Received Nutrient/Fluid Intake    I/O: -7.6L since 3/13    Comments: LBM: 3/25    Tolerance: was tolerating    Nutrition Risk    Level of Risk/Frequency of Follow-up:  (1x/week)     Monitor and Evaluation    Food and Nutrient Intake: food and beverage intake, energy intake  Food and Nutrient Adminstration: diet order  Physical Activity and Function: nutrition-related ADLs and IADLs  Anthropometric Measurements: weight, weight change  Biochemical Data, Medical Tests and Procedures: inflammatory profile, lipid profile, glucose/endocrine profile, gastrointestinal profile, electrolyte and renal panel  Nutrition-Focused Physical Findings: overall appearance     Nutrition Follow-Up    RD Follow-up?: Yes

## 2024-03-27 NOTE — ASSESSMENT & PLAN NOTE
- eliquis transitioned to Heparin which was paused temporarily d/t concern of new hematuria which developed 3/25. Discussed with urology, the hematuria resolved with repositioning of the fung. Heparin resumed, but will temporarily be paused as patient needs preoperative blood transfusion and has minimal IV access d/t placement difficulties & limb alert on R-arm d/t DVT      - heparin to restart on 3/28, then transition to eliquis if no bleeding

## 2024-03-27 NOTE — ANESTHESIA PROCEDURE NOTES
Intubation    Date/Time: 3/27/2024 9:36 AM    Performed by: Vickie Eden MD  Authorized by: Mart Welch MD    Intubation:     Induction:  Intravenous    Intubated:  Postinduction    Mask Ventilation:  Easy mask    Attempts:  1    Attempted By:  Resident anesthesiologist    Method of Intubation:  Direct    Blade:  Grajdea 2    Laryngeal View Grade: Grade I - full view of cords      Difficult Airway Encountered?: No      Complications:  None    Airway Device:  Oral endotracheal tube    Airway Device Size:  7.5    Style/Cuff Inflation:  Cuffed (inflated to minimal occlusive pressure)    Tube secured:  23    Secured at:  The lips    Placement Verified By:  Capnometry    Complicating Factors:  None    Findings Post-Intubation:  BS equal bilateral and atraumatic/condition of teeth unchanged

## 2024-03-27 NOTE — PLAN OF CARE
Patient AAOx3, reorient to time and situation. NPO after midnight to prepare for procedure. Heparin Drip infusion stopped @0000. No apparent distress noted. Denies pain/discomfort. Plan of care continues.     Problem: Diabetes Comorbidity  Goal: Blood Glucose Level Within Targeted Range  Outcome: Ongoing, Progressing     Problem: Adjustment to Illness (Sepsis/Septic Shock)  Goal: Optimal Coping  Outcome: Ongoing, Progressing     Problem: Bleeding (Sepsis/Septic Shock)  Goal: Absence of Bleeding  Outcome: Ongoing, Progressing     Problem: Glycemic Control Impaired (Sepsis/Septic Shock)  Goal: Blood Glucose Level Within Desired Range  Outcome: Ongoing, Progressing     Problem: Infection Progression (Sepsis/Septic Shock)  Goal: Absence of Infection Signs and Symptoms  Outcome: Ongoing, Progressing     Problem: Impaired Wound Healing  Goal: Optimal Wound Healing  Outcome: Ongoing, Progressing     Problem: Skin Injury Risk Increased  Goal: Skin Health and Integrity  Outcome: Ongoing, Progressing

## 2024-03-27 NOTE — ANESTHESIA PROCEDURE NOTES
L femoral single shot    Patient location during procedure: pre-op   Block not for primary anesthetic.  Reason for block: at surgeon's request and post-op pain management   Post-op Pain Location: LLE pain   Start time: 3/27/2024 8:45 AM  Timeout: 3/27/2024 8:45 AM   End time: 3/27/2024 8:50 AM    Staffing  Authorizing Provider: David Nash MD  Performing Provider: Sarah Latif MD    Staffing  Performed by: Sarah Latif MD  Authorized by: David Nash MD    Preanesthetic Checklist  Completed: patient identified, IV checked, site marked, risks and benefits discussed, surgical consent, monitors and equipment checked, pre-op evaluation and timeout performed  Peripheral Block  Patient position: supine  Prep: ChloraPrep  Patient monitoring: heart rate, cardiac monitor, continuous pulse ox, continuous capnometry and frequent blood pressure checks  Block type: femoral  Laterality: left  Injection technique: single shot  Needle  Needle type: Stimuplex   Needle gauge: 22 G  Needle length: 2 in  Needle localization: anatomical landmarks and ultrasound guidance   -ultrasound image captured on disc.  Assessment  Injection assessment: negative aspiration, negative parasthesia and local visualized surrounding nerve  Paresthesia pain: none  Heart rate change: no  Slow fractionated injection: yes  Pain Tolerance: no complaints and comfortable throughout block  Medications:    Medications: ropivacaine (NAROPIN) injection 0.5% - Perineural   20 mL - 3/27/2024 8:50:00 AM    Additional Notes  VSS.  [facility] RN monitoring vitals throughout procedure.  Patient tolerated procedure well.

## 2024-03-27 NOTE — CARE UPDATE
-Glucose Goal 140-180    -A1C:   Hemoglobin A1C   Date Value Ref Range Status   03/05/2024 >14.0 (H) 4.0 - 5.6 % Final     Comment:     ADA Screening Guidelines:  5.7-6.4%  Consistent with prediabetes  >or=6.5%  Consistent with diabetes    High levels of fetal hemoglobin interfere with the HbA1C  assay. Heterozygous hemoglobin variants (HbS, HgC, etc)do  not significantly interfere with this assay.   However, presence of multiple variants may affect accuracy.           -HOME REGIMEN: Novolog 6 units w/ meals and Lantus 45 units nightly (states he is not taking).     -GLUCOSE TREND FOR THE PAST 24HRS:   Recent Labs   Lab 03/25/24  2057 03/26/24  0804 03/26/24  1213 03/26/24  1610 03/26/24  2218 03/27/24  0736   POCTGLUCOSE 196* 158* 200* 159* 128* 130*         -NO HYPOGYCEMIAS NOTED     - Diet  Diet NPO Except for: Medication    -NPO? NPO for PM placement      Patient off of the floor for PM procedure. No changes made to regimen.     Plan:   - Levemir (Insulin Detemir) 13 units BID   - Novolog (Insulin Aspart) 4-6 units with breakfast, 3-5 lunch and 2-4 units with dinner (hold while NPO) and prn for BG excursions LDC SSI (150/50)   - BG checks q 4 hr while NPO   - Hypoglycemia protocol in place     ** Please notify Endocrine for any change and/or advance in diet**  ** Please call Endocrine for any BG related issues **     Discharge Planning:   TBD. Please notify endocrinology prior to discharge.

## 2024-03-27 NOTE — ASSESSMENT & PLAN NOTE
Patient developed new urinary retention.     -Fung's placed   -Flomax  -Renal ultrasound showed no hydronephrosis  - Making adequate urine with fung, c/f hematuria which developed 3/25 and resolved with repositioning of fung. See urology note from same date  - voiding trial prior to DC. If unable to void, urology f/u outpatient

## 2024-03-27 NOTE — ASSESSMENT & PLAN NOTE
Patient's FSGs are uncontrolled due to hyperglycemia on current medication regimen.  Last A1c reviewed-   Lab Results   Component Value Date    HGBA1C >14.0 (H) 03/05/2024     Most recent fingerstick glucose reviewed-   Recent Labs   Lab 03/27/24  0736 03/27/24  0758 03/27/24  1242 03/27/24  1600   POCTGLUCOSE 130* 132* 174* 251*       Current correctional scale  Low  Increase anti-hyperglycemic dose as follows-   Antihyperglycemics (From admission, onward)    Start     Stop Route Frequency Ordered    03/27/24 1015  insulin aspart U-100 pen 0-5 Units         -- SubQ Every 4 hours PRN 03/27/24 0908    03/24/24 2100  insulin detemir U-100 (Levemir) pen 13 Units         -- SubQ 2 times daily 03/24/24 0929    03/24/24 0715  insulin aspart U-100 pen 4-6 Units         -- SubQ with breakfast 03/23/24 0913    03/20/24 1645  insulin aspart U-100 pen 2-4 Units         -- SubQ with dinner 03/20/24 0644    03/20/24 1130  insulin aspart U-100 pen 3-5 Units         -- SubQ with lunch 03/20/24 0644        Hold Oral hypoglycemics while patient is in the hospital.    - Endocrine's following an making insulin adjustments as needed.

## 2024-03-27 NOTE — SUBJECTIVE & OBJECTIVE
Medications:  Continuous Infusions:  Scheduled Meds:   atorvastatin  40 mg Oral Daily    celecoxib  400 mg Oral Once Pre-Op    collagenase   Topical (Top) Daily    ertapenem (INVanz) IV (PEDS and ADULTS)  1 g Intravenous Q24H    gabapentin  300 mg Oral BID    insulin aspart U-100  2-4 Units Subcutaneous with dinner    insulin aspart U-100  3-5 Units Subcutaneous with lunch    insulin aspart U-100  4-6 Units Subcutaneous with breakfast    insulin detemir U-100  13 Units Subcutaneous BID    NIFEdipine  90 mg Oral Daily    pantoprazole  40 mg Oral Daily    sodium chloride 0.9%  10 mL Intravenous Q6H    tamsulosin  0.4 mg Oral Daily    voriconazole  200 mg Oral BID     PRN Meds:acetaminophen, aluminum-magnesium hydroxide-simethicone, dextrose 10%, dextrose 10%, fentaNYL, glucagon (human recombinant), glucose, glucose, insulin aspart U-100, melatonin, midazolam, ondansetron, sodium chloride 0.9%, Flushing PICC/Midline Protocol **AND** sodium chloride 0.9% **AND** sodium chloride 0.9%, triamcinolone acetonide 0.025%     Objective:     Vital Signs (Most Recent):  Temp: 98.3 °F (36.8 °C) (03/27/24 0756)  Pulse: 84 (03/27/24 0756)  Resp: 18 (03/27/24 0756)  BP: (!) 120/56 (03/27/24 0757)  SpO2: (!) 93 % (03/27/24 0756) Vital Signs (24h Range):  Temp:  [98.3 °F (36.8 °C)-100.2 °F (37.9 °C)] 98.3 °F (36.8 °C)  Pulse:  [79-98] 84  Resp:  [17-21] 18  SpO2:  [89 %-94 %] 93 %  BP: (111-156)/(56-76) 120/56          Physical Exam  Vitals reviewed.   Constitutional:       Appearance: He is obese. He is ill-appearing.   HENT:      Head: Normocephalic.   Cardiovascular:      Rate and Rhythm: Normal rate and regular rhythm.   Musculoskeletal:         General: Swelling and deformity present.      Right lower leg: Edema present.      Left lower leg: Edema present.      Comments: s/p L Chopart amputation, healing poorly   Skin:     General: Skin is dry.      Findings: Bruising, lesion and rash present.      Comments: b/l LE swelling with  thick discolored skin. Wound on plantar L foot.    Neurological:      Mental Status: Mental status is at baseline. He is disoriented.   Psychiatric:         Mood and Affect: Mood normal.          Significant Labs:  BMP:   Recent Labs   Lab 03/26/24  0413   *      K 3.9      CO2 24   BUN 19   CREATININE 1.3   CALCIUM 8.5*     CBC:   Recent Labs   Lab 03/26/24  0413   WBC 6.18   RBC 3.00*   HGB 7.4*   HCT 24.5*   *   MCV 82   MCH 24.7*   MCHC 30.2*       Significant Diagnostics:  I have reviewed all pertinent imaging results/findings within the past 24 hours.

## 2024-03-27 NOTE — TRANSFER OF CARE
"Anesthesia Transfer of Care Note    Patient: Saji Castañeda    Procedure(s) Performed: Procedure(s) (LRB):  AMPUTATION, ABOVE KNEE (Left)    Patient location: PACU    Anesthesia Type: general    Transport from OR: Transported from OR on 6-10 L/min O2 by face mask with adequate spontaneous ventilation    Post pain: adequate analgesia    Post assessment: no apparent anesthetic complications and tolerated procedure well    Post vital signs: stable    Level of consciousness: lethargic    Nausea/Vomiting: no nausea/vomiting    Complications: none    Transfer of care protocol was followed      Last vitals: Visit Vitals  BP (!) 118/55 (BP Location: Left arm, Patient Position: Lying)   Pulse 80   Temp 36.8 °C (98.3 °F) (Oral)   Resp 16   Ht 5' 9" (1.753 m)   Wt 120.2 kg (264 lb 15.9 oz)   SpO2 (!) 91%   BMI 39.13 kg/m²     "

## 2024-03-27 NOTE — NURSING TRANSFER
Nursing Transfer Note      3/27/2024   12:29 PM    Nurse giving handoff:Miguel Ángel ALLISON Rn  Nurse receiving handoff: Edilia Ozuna    Reason patient is being transferred: postop    Transfer To: 80    Transfer via stretcher    Transfer with n/a    Transported by transport    Transfer Vital Signs:  Blood Pressure:see flow sheet  Heart Rate:  O2:  Temperature:  Respirations:    Telemetry: yes  Order for Tele Monitor? yes    Additional Lines: fung       Medicines sent: n/a    Any special needs or follow-up needed: n/a    Patient belongings transferred with patient:  n/a    Chart send with patient: Yes    Notified: n/a    Patient reassessed at: 3/27/24  1  Upon arrival to floor: bed in lowest position

## 2024-03-27 NOTE — ASSESSMENT & PLAN NOTE
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 Patient febrile today and WBC 11.67. Xray L foot shows acute OM to calcaneus. Patient was previously not amendable to AKA but has now agreed to continue with amputation.    - Proceed to OR today for AKA  - NPO at midnight in anticipation for OR  - All other care per primary  - Vascular surgery will continue to follow

## 2024-03-28 LAB
ALBUMIN SERPL BCP-MCNC: 1.6 G/DL (ref 3.5–5.2)
ALBUMIN SERPL BCP-MCNC: 1.7 G/DL (ref 3.5–5.2)
ALP SERPL-CCNC: 70 U/L (ref 55–135)
ALP SERPL-CCNC: 73 U/L (ref 55–135)
ALT SERPL W/O P-5'-P-CCNC: 8 U/L (ref 10–44)
ALT SERPL W/O P-5'-P-CCNC: 8 U/L (ref 10–44)
ANION GAP SERPL CALC-SCNC: 10 MMOL/L (ref 8–16)
ANION GAP SERPL CALC-SCNC: 12 MMOL/L (ref 8–16)
AST SERPL-CCNC: 23 U/L (ref 10–40)
AST SERPL-CCNC: 27 U/L (ref 10–40)
BASOPHILS # BLD AUTO: 0.01 K/UL (ref 0–0.2)
BASOPHILS NFR BLD: 0.1 % (ref 0–1.9)
BILIRUB SERPL-MCNC: 0.2 MG/DL (ref 0.1–1)
BILIRUB SERPL-MCNC: 0.2 MG/DL (ref 0.1–1)
BUN SERPL-MCNC: 31 MG/DL (ref 8–23)
BUN SERPL-MCNC: 34 MG/DL (ref 8–23)
CALCIUM SERPL-MCNC: 8 MG/DL (ref 8.7–10.5)
CALCIUM SERPL-MCNC: 8.6 MG/DL (ref 8.7–10.5)
CHLORIDE SERPL-SCNC: 110 MMOL/L (ref 95–110)
CHLORIDE SERPL-SCNC: 111 MMOL/L (ref 95–110)
CO2 SERPL-SCNC: 20 MMOL/L (ref 23–29)
CO2 SERPL-SCNC: 22 MMOL/L (ref 23–29)
CREAT SERPL-MCNC: 1.6 MG/DL (ref 0.5–1.4)
CREAT SERPL-MCNC: 1.7 MG/DL (ref 0.5–1.4)
DIFFERENTIAL METHOD BLD: ABNORMAL
EOSINOPHIL # BLD AUTO: 0 K/UL (ref 0–0.5)
EOSINOPHIL NFR BLD: 0.1 % (ref 0–8)
ERYTHROCYTE [DISTWIDTH] IN BLOOD BY AUTOMATED COUNT: 16.3 % (ref 11.5–14.5)
ERYTHROCYTE [DISTWIDTH] IN BLOOD BY AUTOMATED COUNT: 16.3 % (ref 11.5–14.5)
EST. GFR  (NO RACE VARIABLE): 41.5 ML/MIN/1.73 M^2
EST. GFR  (NO RACE VARIABLE): 44.7 ML/MIN/1.73 M^2
GLUCOSE SERPL-MCNC: 228 MG/DL (ref 70–110)
GLUCOSE SERPL-MCNC: 256 MG/DL (ref 70–110)
HCT VFR BLD AUTO: 24.6 % (ref 40–54)
HCT VFR BLD AUTO: 25.8 % (ref 40–54)
HGB BLD-MCNC: 7.5 G/DL (ref 14–18)
HGB BLD-MCNC: 7.9 G/DL (ref 14–18)
IMM GRANULOCYTES # BLD AUTO: 0.05 K/UL (ref 0–0.04)
IMM GRANULOCYTES NFR BLD AUTO: 0.5 % (ref 0–0.5)
LYMPHOCYTES # BLD AUTO: 0.8 K/UL (ref 1–4.8)
LYMPHOCYTES NFR BLD: 7.4 % (ref 18–48)
MAGNESIUM SERPL-MCNC: 2.2 MG/DL (ref 1.6–2.6)
MCH RBC QN AUTO: 24.6 PG (ref 27–31)
MCH RBC QN AUTO: 25.1 PG (ref 27–31)
MCHC RBC AUTO-ENTMCNC: 30.5 G/DL (ref 32–36)
MCHC RBC AUTO-ENTMCNC: 30.6 G/DL (ref 32–36)
MCV RBC AUTO: 81 FL (ref 82–98)
MCV RBC AUTO: 82 FL (ref 82–98)
MONOCYTES # BLD AUTO: 0.6 K/UL (ref 0.3–1)
MONOCYTES NFR BLD: 5 % (ref 4–15)
NEUTROPHILS # BLD AUTO: 9.6 K/UL (ref 1.8–7.7)
NEUTROPHILS NFR BLD: 86.9 % (ref 38–73)
NRBC BLD-RTO: 0 /100 WBC
PHOSPHATE SERPL-MCNC: 3.5 MG/DL (ref 2.7–4.5)
PLATELET # BLD AUTO: 401 K/UL (ref 150–450)
PLATELET # BLD AUTO: 452 K/UL (ref 150–450)
PMV BLD AUTO: 9.3 FL (ref 9.2–12.9)
PMV BLD AUTO: 9.4 FL (ref 9.2–12.9)
POCT GLUCOSE: 196 MG/DL (ref 70–110)
POCT GLUCOSE: 209 MG/DL (ref 70–110)
POCT GLUCOSE: 212 MG/DL (ref 70–110)
POCT GLUCOSE: 262 MG/DL (ref 70–110)
POTASSIUM SERPL-SCNC: 4.2 MMOL/L (ref 3.5–5.1)
POTASSIUM SERPL-SCNC: 4.5 MMOL/L (ref 3.5–5.1)
PROT SERPL-MCNC: 6.5 G/DL (ref 6–8.4)
PROT SERPL-MCNC: 6.9 G/DL (ref 6–8.4)
RBC # BLD AUTO: 3.05 M/UL (ref 4.6–6.2)
RBC # BLD AUTO: 3.15 M/UL (ref 4.6–6.2)
SODIUM SERPL-SCNC: 142 MMOL/L (ref 136–145)
SODIUM SERPL-SCNC: 143 MMOL/L (ref 136–145)
WBC # BLD AUTO: 11 K/UL (ref 3.9–12.7)
WBC # BLD AUTO: 11.48 K/UL (ref 3.9–12.7)

## 2024-03-28 PROCEDURE — 36415 COLL VENOUS BLD VENIPUNCTURE: CPT

## 2024-03-28 PROCEDURE — 25000003 PHARM REV CODE 250

## 2024-03-28 PROCEDURE — 63600175 PHARM REV CODE 636 W HCPCS

## 2024-03-28 PROCEDURE — 20600001 HC STEP DOWN PRIVATE ROOM

## 2024-03-28 PROCEDURE — 85027 COMPLETE CBC AUTOMATED: CPT

## 2024-03-28 PROCEDURE — 25000003 PHARM REV CODE 250: Performed by: PHYSICIAN ASSISTANT

## 2024-03-28 PROCEDURE — 27000207 HC ISOLATION

## 2024-03-28 PROCEDURE — 85025 COMPLETE CBC W/AUTO DIFF WBC: CPT

## 2024-03-28 PROCEDURE — 80053 COMPREHEN METABOLIC PANEL: CPT

## 2024-03-28 PROCEDURE — 63600175 PHARM REV CODE 636 W HCPCS: Performed by: PHYSICIAN ASSISTANT

## 2024-03-28 PROCEDURE — 36415 COLL VENOUS BLD VENIPUNCTURE: CPT | Mod: XB

## 2024-03-28 PROCEDURE — 97168 OT RE-EVAL EST PLAN CARE: CPT

## 2024-03-28 PROCEDURE — 99232 SBSQ HOSP IP/OBS MODERATE 35: CPT | Mod: ,,,

## 2024-03-28 PROCEDURE — A4216 STERILE WATER/SALINE, 10 ML: HCPCS

## 2024-03-28 PROCEDURE — 99233 SBSQ HOSP IP/OBS HIGH 50: CPT | Mod: ,,, | Performed by: PHYSICIAN ASSISTANT

## 2024-03-28 PROCEDURE — 80053 COMPREHEN METABOLIC PANEL: CPT | Mod: 91

## 2024-03-28 PROCEDURE — 84100 ASSAY OF PHOSPHORUS: CPT

## 2024-03-28 PROCEDURE — 97164 PT RE-EVAL EST PLAN CARE: CPT

## 2024-03-28 PROCEDURE — 83735 ASSAY OF MAGNESIUM: CPT

## 2024-03-28 RX ORDER — HYDROMORPHONE HYDROCHLORIDE 1 MG/ML
0.5 INJECTION, SOLUTION INTRAMUSCULAR; INTRAVENOUS; SUBCUTANEOUS EVERY 6 HOURS PRN
Status: DISCONTINUED | OUTPATIENT
Start: 2024-03-28 | End: 2024-04-03

## 2024-03-28 RX ORDER — SODIUM CHLORIDE, SODIUM LACTATE, POTASSIUM CHLORIDE, CALCIUM CHLORIDE 600; 310; 30; 20 MG/100ML; MG/100ML; MG/100ML; MG/100ML
INJECTION, SOLUTION INTRAVENOUS CONTINUOUS
Status: ACTIVE | OUTPATIENT
Start: 2024-03-28 | End: 2024-03-28

## 2024-03-28 RX ORDER — TALC
6 POWDER (GRAM) TOPICAL NIGHTLY PRN
Status: DISCONTINUED | OUTPATIENT
Start: 2024-03-28 | End: 2024-04-05 | Stop reason: HOSPADM

## 2024-03-28 RX ORDER — TALC
6 POWDER (GRAM) TOPICAL NIGHTLY
Status: DISCONTINUED | OUTPATIENT
Start: 2024-03-28 | End: 2024-03-28

## 2024-03-28 RX ADMIN — ATORVASTATIN CALCIUM 40 MG: 40 TABLET, FILM COATED ORAL at 08:03

## 2024-03-28 RX ADMIN — INSULIN DETEMIR 13 UNITS: 100 INJECTION, SOLUTION SUBCUTANEOUS at 08:03

## 2024-03-28 RX ADMIN — INSULIN ASPART 1 UNITS: 100 INJECTION, SOLUTION INTRAVENOUS; SUBCUTANEOUS at 09:03

## 2024-03-28 RX ADMIN — GABAPENTIN 300 MG: 300 CAPSULE ORAL at 08:03

## 2024-03-28 RX ADMIN — Medication 10 ML: at 12:03

## 2024-03-28 RX ADMIN — INSULIN DETEMIR 13 UNITS: 100 INJECTION, SOLUTION SUBCUTANEOUS at 09:03

## 2024-03-28 RX ADMIN — OXYCODONE 5 MG: 5 TABLET ORAL at 08:03

## 2024-03-28 RX ADMIN — TRAZODONE HYDROCHLORIDE 25 MG: 50 TABLET ORAL at 09:03

## 2024-03-28 RX ADMIN — COLLAGENASE SANTYL: 250 OINTMENT TOPICAL at 08:03

## 2024-03-28 RX ADMIN — ERTAPENEM 1 G: 1 INJECTION INTRAMUSCULAR; INTRAVENOUS at 12:03

## 2024-03-28 RX ADMIN — GABAPENTIN 300 MG: 300 CAPSULE ORAL at 09:03

## 2024-03-28 RX ADMIN — INSULIN ASPART 2 UNITS: 100 INJECTION, SOLUTION INTRAVENOUS; SUBCUTANEOUS at 05:03

## 2024-03-28 RX ADMIN — SODIUM CHLORIDE, POTASSIUM CHLORIDE, SODIUM LACTATE AND CALCIUM CHLORIDE: 600; 310; 30; 20 INJECTION, SOLUTION INTRAVENOUS at 12:03

## 2024-03-28 RX ADMIN — INSULIN ASPART 3 UNITS: 100 INJECTION, SOLUTION INTRAVENOUS; SUBCUTANEOUS at 08:03

## 2024-03-28 RX ADMIN — NIFEDIPINE 90 MG: 30 TABLET, FILM COATED, EXTENDED RELEASE ORAL at 08:03

## 2024-03-28 RX ADMIN — VORICONAZOLE 200 MG: 200 TABLET, FILM COATED ORAL at 08:03

## 2024-03-28 RX ADMIN — VORICONAZOLE 200 MG: 200 TABLET, FILM COATED ORAL at 09:03

## 2024-03-28 RX ADMIN — Medication 10 ML: at 06:03

## 2024-03-28 RX ADMIN — TAMSULOSIN HYDROCHLORIDE 0.4 MG: 0.4 CAPSULE ORAL at 08:03

## 2024-03-28 RX ADMIN — INSULIN ASPART 4 UNITS: 100 INJECTION, SOLUTION INTRAVENOUS; SUBCUTANEOUS at 08:03

## 2024-03-28 RX ADMIN — PANTOPRAZOLE SODIUM 40 MG: 40 TABLET, DELAYED RELEASE ORAL at 08:03

## 2024-03-28 RX ADMIN — INSULIN ASPART 3 UNITS: 100 INJECTION, SOLUTION INTRAVENOUS; SUBCUTANEOUS at 01:03

## 2024-03-28 RX ADMIN — OXYCODONE 5 MG: 5 TABLET ORAL at 09:03

## 2024-03-28 NOTE — ASSESSMENT & PLAN NOTE
Resolved    Patient with acute kidney injury/acute renal failure likely due to pre-renal azotemia due to dehydration and post-obstructive d/t urinary retention  YNES is currently stable. Baseline creatinine  1.1  - Labs reviewed- Renal function/electrolytes with Estimated Creatinine Clearance: 51.1 mL/min (A) (based on SCr of 1.6 mg/dL (H)). according to latest data. Monitor urine output and serial BMP and adjust therapy as needed. Avoid nephrotoxins and renally dose meds for GFR listed above.    Creatinine 2.1 on admit, baseline around 1.1  - prerenal vs obstructive (fung placed)     Lab Results   Component Value Date    CREATININE 1.6 (H) 03/28/2024       Plan:   - Strict I&Os and daily weights   - Daily BMP  - Trend sCr  - Avoid nephrotoxic agents such as NSAIDs, ACEi, ARBs and IV radiocontrast.  - Renally dose meds to current GFR.  - Maintain MAP > 65.  - No indications for HD at this time.   - Maintain electrolytes at goal: Mg > 2, Phos > 3, K > 4.

## 2024-03-28 NOTE — ASSESSMENT & PLAN NOTE
"Source control achieved with proximal amputation. Per ID can discontinue antimicrobials 24-48 hours after surgery as long term treatment is not indicated    This patient does have evidence of infective focus  My overall impression is sepsis.  Source: Skin and Soft Tissue (location left leg)  Antibiotics given-   Antibiotics (72h ago, onward)      Start     Stop Route Frequency Ordered    03/08/24 1230  ertapenem (INVANZ) 1 g in sodium chloride 0.9 % 100 mL IVPB (MB+)         -- IV Every 24 hours (non-standard times) 03/08/24 1115          Latest lactate reviewed-  No results for input(s): "LACTATE", "POCLAC" in the last 72 hours.    Organ dysfunction indicated by Acute kidney injury    Fluid challenge Actual Body weight- Patient will receive 30ml/kg actual body weight to calculate fluid bolus for treatment of septic shock.     Post- resuscitation assessment No - Post resuscitation assessment not needed       Will Not start Pressors-     - See osteo    "

## 2024-03-28 NOTE — CONSULTS
[facility] - Telemetry Stepdown  Infectious Disease  Consult Note    Patient Name: Saji Castañeda  MRN: [id number]  Admission Date: 3/5/2024  Hospital Length of Stay: 23 days  Attending Physician: Jm Rosa MD  Primary Care Provider: [facility] -     Isolation Status: Contact    Patient information was obtained from past medical records.      Inpatient consult to Infectious Diseases  Consult performed by: Kimmy Payne PA-C  Consult ordered by: Milagros Nicolas MD        Assessment/Plan:     ID  Cellulitis of left lower leg  73 y/o male with A-fib, uncontrolled DMII, HLD, HTN, chronic heel ulcerations c/b chronic osteomyelitis, chronic leg wounds admitted for DKA. ID consulted for infected chronic wounds/ulcerations and abx guidance as with hx of MDROs.     Podiatry obtained left heel wound cultures (probes to bone). Cx + Proteus mirabilis, E. Cloaecae, Fusarium and C. Albicans. Patient has been on IV Ertapenem and Voriconazole. Despite antimicrobials developed fevers. He is now s/p Left AKA on 3/27. ID re-consulted for updated recommendations.     Recommendations  Source control achieved with proximal amputation. Can discontinue antimicrobials 24-48 hours after surgery as long term treatment is not indicated.  Discussed with ID staff. ID will sign off.          Thank you for your consult. I will sign off. Please contact us if you have any additional questions.    Kimmy Payne PA-C  Infectious Disease  [facility] - Telemetry Stepdown    Subjective:     Principal Problem: Osteomyelitis of left lower extremity    HPI: 73 y/o male with a.fib, DMII uncontrolled, HLD, HTN, chronic heel ulcerations c/b chronic osteomyelitis, hx of  Acinetobacter and ESBL klebsiella admitted for DKA. ID consulted for chronic wounds/ulcerations and abx guidance as with hx of MDROs.  ID followed and rec 6 weeks of IV Ertapenem and 6 months of Voriconazole for LLE cultures showing Proteus M/E cloacae and Fusarium/C  Albicans respectively on cultures.    3/19 spike fever 102.3.  Blood cultures sent and are NGTD.  Developed urinary retention and fung placed - UA negative.  CXR done showing: lung zones stable, no significant new areas of consolidation or volume loss.  US UEs 3/14 showed RUE brachial DVT and is on heparin and clopidogrel.  ID consulted for fevers despite abx/antifungals.  Patient seen and denies symptoms when had fever.  LLE pain stable.  Patient had declined vasc sx rec of AKA/BKA but now is amenable and is scheduled for 3/27.  The patient denies any recent fever, chills, or sweats.      Past Medical History:   Diagnosis Date    Arthritis     legs    Bacteremia due to Gram-negative bacteria 3/23/2021    Diabetes mellitus     Diabetes mellitus, type 2     Hyperlipidemia     Hypertension     Osteomyelitis     Palliative care encounter 5/24/2023       Past Surgical History:   Procedure Laterality Date    ABOVE-KNEE AMPUTATION Left 3/27/2024    Procedure: AMPUTATION, ABOVE KNEE;  Surgeon: Adal Arellano MD;  Location: [facility] OR [address] [place] [address];  Service: Vascular;  Laterality: Left;    ANGIOGRAPHY OF LOWER EXTREMITY N/A 2/3/2021    Procedure: Angiogram Extremity Bilateral;  Surgeon: Ernst Chacko MD;  Location: [facility] OR [address] [place] [address];  Service: Peripheral Vascular;  Laterality: N/A;  7.4 mintues fluoroscopy time  816.15 mGy  170.17 Gycm2    AORTOGRAPHY WITH EXTREMITY RUNOFF Bilateral 2/3/2021    Procedure: AORTOGRAM, WITH EXTREMITY RUNOFF;  Surgeon: Ernst Chacko MD;  Location: [facility] OR [address] [place] [address];  Service: Peripheral Vascular;  Laterality: Bilateral;    DEBRIDEMENT OF FOOT Left 2/23/2021    Procedure: DEBRIDEMENT, LEFT HEEL;  Surgeon: Mayra Schroeder DPM;  Location: [facility] OR [address] [place] [address];  Service: Podiatry;  Laterality: Left;    FOOT AMPUTATION  October 2010    left high midfoot amputation    IMPLANTATION OF LEADLESS PACEMAKER N/A 10/12/2023    Procedure: [id number];  Surgeon: Juan Ramon  "VANESSA Montoya MD;  Location: [facility];  Service: Cardiology;  Laterality: N/A;  AVB, MICRA, EH, ANES, RM [id number]       Review of patient's allergies indicates:  No Known Allergies    Medications:  Medications Prior to Admission   Medication Sig    acetaminophen (TYLENOL) 325 MG tablet Take 650 mg by mouth every 6 (six) hours as needed for Temperature greater than.    acidophilus-pectin, citrus 100 million cell-10 mg Cap Take 1 capsule by mouth 2 (two) times a day.    ascorbic acid, vitamin C, (VITAMIN C) 500 MG tablet Take 500 mg by mouth 2 (two) times daily.    B COMPLEX-VITAMIN B12 tablet Take 1 tablet by mouth once daily.    BD AUTOSHIELD DUO PEN NEEDLE 30 gauge x 3/16" Ndle     BD ULTRA-FINE ADITYA PEN NEEDLE 32 gauge x 5/32" Ndle USE FOUR TIMES DAILY WITH MEALS AND NIGHTLY    blood sugar diagnostic (CONTOUR TEST STRIPS) Strp Inject 1 strip into the skin 2 (two) times daily before meals.    clopidogreL (PLAVIX) 75 mg tablet Take 1 tablet (75 mg total) by mouth once daily.    gabapentin (NEURONTIN) 300 MG capsule Take 300 mg by mouth 2 (two) times daily.    insulin aspart U-100 (NOVOLOG) 100 unit/mL (3 mL) InPn pen Inject 6 Units into the skin 3 (three) times daily with meals.    MICROLET LANCET Misc     multivitamin (THERAGRAN) per tablet Take 1 tablet by mouth once daily.    NIFEdipine (PROCARDIA-XL) 60 MG (OSM) 24 hr tablet Take 1 tablet (60 mg total) by mouth once daily.    pantoprazole (PROTONIX) 40 MG tablet Take 40 mg by mouth once daily. Before breakfast    pen needle, diabetic 32 gauge x 5/32" Ndle use as directed with insulin    rosuvastatin (CRESTOR) 40 MG Tab Take 40 mg by mouth once daily.    tamsulosin (FLOMAX) 0.4 mg Cap Take 0.4 mg by mouth once daily.    triamcinolone acetonide 0.025% (KENALOG) 0.025 % Oint Apply topically 2 (two) times daily as needed (to affected area).    [DISCONTINUED] apixaban (ELIQUIS) 5 mg Tab Take 1 tablet (5 mg total) by mouth 2 (two) times daily.    [DISCONTINUED] " diphenhydrAMINE (BENADRYL) 25 mg capsule No directions listed on the patients medication list.    [DISCONTINUED] furosemide (LASIX) 40 MG tablet Take 20 mg by mouth.    [DISCONTINUED] glipiZIDE (GLUCOTROL) 10 MG tablet Take 20 mg by mouth 2 (two) times a day.    [DISCONTINUED] hydrALAZINE (APRESOLINE) 100 MG tablet Take 1 tablet (100 mg total) by mouth every 8 (eight) hours.    [DISCONTINUED] isosorbide dinitrate (ISORDIL) 10 MG tablet Take 1 tablet (10 mg total) by mouth 3 (three) times daily.    [DISCONTINUED] LANTUS SOLOSTAR U-100 INSULIN glargine 100 units/mL SubQ pen Inject 45 Units into the skin every evening. (Patient taking differently: Inject 45 Units into the skin once daily.)    [DISCONTINUED] miconazole NITRATE 2 % (MICOTIN) 2 % top powder Apply topically 2 (two) times daily.    [DISCONTINUED] oxyCODONE 5 mg TbOr Take 1 tablet by mouth every 6 (six) hours as needed (Pain (Max 5 daily)).    [DISCONTINUED] sodium hypochlorite 0.5 % (DAKIN'S SOLUTION) external solution Apply topically once daily.     Antibiotics (From admission, onward)      Start     Stop Route Frequency Ordered    03/08/24 1230  ertapenem (INVANZ) 1 g in sodium chloride 0.9 % 100 mL IVPB (MB+)         -- IV Every 24 hours (non-standard times) 03/08/24 1115          Antifungals (From admission, onward)      Start     Stop Route Frequency Ordered    03/15/24 1045  voriconazole tablet 200 mg         -- Oral 2 times daily 03/15/24 0936          Antivirals (From admission, onward)      None             Immunization History   Administered Date(s) Administered    COVID-19, MRNA, LN-S, PF (MODERNA FULL 0.5 ML DOSE) 05/24/2021, 07/13/2021    Influenza - Trivalent (ADULT) 10/08/2020    PPD Test 12/09/2020, 03/02/2021, 06/21/2022, 03/18/2024    Pneumococcal Conjugate - 13 Valent 10/16/2018       Family History       Problem Relation (Age of Onset)    Cancer Brother    Diabetes Mother, Sister    Heart disease Mother    Stroke Sister          Social  History     Socioeconomic History    Marital status: Single   Tobacco Use    Smoking status: Never    Smokeless tobacco: Never   Substance and Sexual Activity    Alcohol use: No     Comment: occassional    Drug use: No    Sexual activity: Not Currently   Social History Narrative    Not currently working; lives with family     Social Determinants of Health     Financial Resource Strain: Medium Risk (3/5/2024)    Overall Financial Resource Strain (CARDIA)     Difficulty of Paying Living Expenses: Somewhat hard   Food Insecurity: No Food Insecurity (3/5/2024)    Hunger Vital Sign     Worried About Running Out of Food in the Last Year: Never true     Ran Out of Food in the Last Year: Never true   Transportation Needs: No Transportation Needs (3/5/2024)    PRAPARE - Transportation     Lack of Transportation (Medical): No     Lack of Transportation (Non-Medical): No   Physical Activity: Inactive (3/5/2024)    Exercise Vital Sign     Days of Exercise per Week: 0 days     Minutes of Exercise per Session: 0 min   Stress: No Stress Concern Present (3/5/2024)    [employer] [employer] - Occupational Stress Questionnaire     Feeling of Stress : Only a little   Social Connections: Socially Isolated (3/5/2024)    Social Connection and Isolation Panel [NHANES]     Frequency of Communication with Friends and Family: More than three times a week     Frequency of Social Gatherings with Friends and Family: Patient declined     Attends Oriental orthodox Services: Never     Active Member of Clubs or Organizations: No     Attends Club or Organization Meetings: Never     Marital Status: Never    Housing Stability: Low Risk  (3/5/2024)    Housing Stability Vital Sign     Unable to Pay for Housing in the Last Year: No     Number of Places Lived in the Last Year: 1     Unstable Housing in the Last Year: No     Review of Systems   Constitutional:  Negative for chills, diaphoresis and fever.   Respiratory:  Negative for  shortness of breath.    Cardiovascular:  Negative for chest pain.   Gastrointestinal:  Negative for abdominal pain, diarrhea, nausea and vomiting.   Genitourinary:  Negative for dysuria and hematuria.   Musculoskeletal:  Positive for myalgias.   Skin:  Positive for color change and wound.     Objective:     Vital Signs (Most Recent):  Temp: 98.3 °F (36.8 °C) (03/28/24 1112)  Pulse: 80 (03/28/24 1112)  Resp: 17 (03/28/24 1112)  BP: 128/60 (03/28/24 1112)  SpO2: (!) 93 % (03/28/24 1112) Vital Signs (24h Range):  Temp:  [97.9 °F (36.6 °C)-98.5 °F (36.9 °C)] 98.3 °F (36.8 °C)  Pulse:  [79-86] 80  Resp:  [17-20] 17  SpO2:  [90 %-95 %] 93 %  BP: (107-129)/(59-67) 128/60     Weight: 120.2 kg (264 lb 15.9 oz)  Body mass index is 39.13 kg/m².    Estimated Creatinine Clearance: 51.1 mL/min (A) (based on SCr of 1.6 mg/dL (H)).     Physical Exam  Constitutional:       General: He is not in acute distress.     Appearance: Normal appearance. He is well-developed. He is not ill-appearing or diaphoretic.   HENT:      Head: Normocephalic and atraumatic.      Right Ear: External ear normal.      Left Ear: External ear normal.      Nose: Nose normal.   Eyes:      General: No scleral icterus.        Right eye: No discharge.         Left eye: No discharge.      Extraocular Movements: Extraocular movements intact.      Conjunctiva/sclera: Conjunctivae normal.   Pulmonary:      Effort: Pulmonary effort is normal. No respiratory distress.      Breath sounds: No stridor.   Skin:     General: Skin is dry.      Coloration: Skin is not jaundiced or pale.      Findings: No erythema.   Neurological:      General: No focal deficit present.      Mental Status: He is alert and oriented to person, place, and time. Mental status is at baseline.   Psychiatric:         Mood and Affect: Mood normal.         Behavior: Behavior normal.         Thought Content: Thought content normal.         Judgment: Judgment normal.          Significant Labs: Blood  Culture:   Recent Labs   Lab 10/02/23  1701 10/06/23  1131 10/06/23  1133 03/05/24  0800 03/19/24  1214   LABBLOO Gram stain aer bottle: Gram negative rods  Results called to and read back by:Alejandra Figueroa RN 10/03/2023  06:29  Gram stain jumana bottle:Gram positive cocci in clusters resembling Staph  Results called to and read back by:Mauro Prado Rn 10/07/2023  00:39  Reviewed by PhD Supervisor, Supervisor, or designee 10/04/2023  13:53  ACINETOBACTER BAUMANNII   further identified as Acinetobacter baumannii nosocomialis group  For susceptibility see order #[id number]  *  COAGULASE-NEGATIVE STAPHYLOCOCCUS SPECIES  Organism is a probable contaminant  *  Gram stain aer bottle: Gram negative rods  Gram stain jumana bottle: Gram negative rods  Results called to and read back by:Alejandra Figueroa RN 10/03/2023  06:29  Gram stain jumana bottle: Gram positive cocci in clusters resembling Staph  Results called to and read back by: Ricky Munguia RN 10/05/2023  06:53  KLEBSIELLA PNEUMONIAE ESBL*  ACINETOBACTER BAUMANNII   further identified as Acinetobacter baumannii nosocomialis group  * No growth after 5 days. No growth after 5 days. No growth after 5 days.  No growth after 5 days. No growth after 5 days.  No growth after 5 days.     CBC:   Recent Labs   Lab 03/28/24  0040 03/28/24  0940   WBC 11.48 11.00   HGB 7.5* 7.9*   HCT 24.6* 25.8*    452*     CMP:   Recent Labs   Lab 03/28/24  0040 03/28/24  0940    143   K 4.5 4.2    111*   CO2 20* 22*   * 228*   BUN 31* 34*   CREATININE 1.7* 1.6*   CALCIUM 8.0* 8.6*   PROT 6.5 6.9   ALBUMIN 1.6* 1.7*   BILITOT 0.2 0.2   ALKPHOS 70 73   AST 23 27   ALT 8* 8*   ANIONGAP 12 10     Microbiology Results (last 7 days)       Procedure Component Value Units Date/Time    Blood culture [[id number]] Collected: 03/19/24 1214    Order Status: Completed Specimen: Blood Updated: 03/24/24 1412     Blood Culture, Routine No growth after 5 days.     Blood culture [[id number]] Collected: 03/19/24 1214    Order Status: Completed Specimen: Blood Updated: 03/24/24 1412     Blood Culture, Routine No growth after 5 days.          Recent Lab Results  (Last 5 results in the past 24 hours)        03/28/24  0940   03/28/24  0819   03/28/24  0040   03/27/24  2148   03/27/24  1836        Albumin 1.7     1.6           ALP 73     70           ALT 8     8           Anion Gap 10     12           AST 27     23           Baso # 0.01               Basophil % 0.1               BILIRUBIN TOTAL 0.2  Comment: For infants and newborns, interpretation of results should be based  on gestational age, weight and in agreement with clinical  observations.    Premature Infant recommended reference ranges:  Up to 24 hours.............<8.0 mg/dL  Up to 48 hours............<12.0 mg/dL  3-5 days..................<15.0 mg/dL  6-29 days.................<15.0 mg/dL       0.2  Comment: For infants and newborns, interpretation of results should be based  on gestational age, weight and in agreement with clinical  observations.    Premature Infant recommended reference ranges:  Up to 24 hours.............<8.0 mg/dL  Up to 48 hours............<12.0 mg/dL  3-5 days..................<15.0 mg/dL  6-29 days.................<15.0 mg/dL             BUN 34     31           Calcium 8.6     8.0           Chloride 111     110           CO2 22     20           Creatinine 1.6     1.7           Differential Method Automated               eGFR 44.7     41.5           Eos # 0.0               Eos % 0.1               Glucose 228     256           Gran # (ANC) 9.6               Gran % 86.9               Hematocrit 25.8     24.6           Hemoglobin 7.9     7.5           Immature Grans (Abs) 0.05  Comment: Mild elevation in immature granulocytes is non specific and   can be seen in a variety of conditions including stress response,   acute inflammation, trauma and pregnancy. Correlation with other   laboratory and  clinical findings is essential.                 Immature Granulocytes 0.5               Lymph # 0.8               Lymph % 7.4               Magnesium  2.2               MCH 25.1     24.6           MCHC 30.6     30.5           MCV 82     81           Mono # 0.6               Mono % 5.0               MPV 9.4     9.3           nRBC 0               Phosphorus Level 3.5               Platelet Count 452     401           POCT Glucose   262     248   250       Potassium 4.2     4.5           PROTEIN TOTAL 6.9     6.5           RBC 3.15     3.05           RDW 16.3     16.3           Sodium 143     142           WBC 11.00     11.48                                  Significant Imaging:     Imaging Results              X-Ray Chest AP Portable (Final result)  Result time 03/05/24 09:57:35      Final result by Maddy Weeks MD (03/05/24 09:57:35)                   Impression:      Mild increased pulmonary vascularity centrally, not significantly changed from the prior study.      Electronically signed by: Maddy Weeks MD  Date:    03/05/2024  Time:    09:57               Narrative:    EXAMINATION:  XR CHEST AP PORTABLE    CLINICAL HISTORY:  Sepsis;    TECHNIQUE:  Single frontal view of the chest was performed.    COMPARISON:  10/12/2023    FINDINGS:  The cardiac silhouette is midline.  Leadless pacer.    The pulmonary vascularity is increased centrally.    No focal airspace disease.    No pleural effusion.  No pneumothorax.    The osseous structures appear normal.

## 2024-03-28 NOTE — PLAN OF CARE
Problem: Physical Therapy  Goal: Physical Therapy Goal  Description: Goals to be met by: 24    Patient will increase functional independence with mobility by performin. Supine to sit with Maximum Assistance  2. Sit to supine with Maximum Assistance  3. Rolling to Left and Right with Maximum Assistance  4. Sitting at edge of bed 10 minutes with Moderate Assistance  5. Lower extremity exercise program x15 reps per handout, with assistance as needed    Outcome: Ongoing, Progressing    Re-Evaluation complete s/p L AKA . Goals modified accordingly

## 2024-03-28 NOTE — SUBJECTIVE & OBJECTIVE
"Interval HPI:   No acute events overnight. Patient in room 8078/8078 A. Blood glucose stable. BG above goal on current insulin regimen (SSI, prandial, and basal insulin ). Steroid use- Dexamethasone  4 mg intra-op yesterday. BG running above goal overnight as AM levemir dose not given yesterday.      1 Day Post-Op  Renal function- Abnormal - 1.7 cr   Vasopressors-  None     Diet diabetic  Calorie     Eatin%  Nausea: No  Hypoglycemia and intervention: No  Fever: No  TPN and/or TF: No    /60 (BP Location: Left arm, Patient Position: Lying)   Pulse 85   Temp 98.3 °F (36.8 °C) (Oral)   Resp 20   Ht 5' 9" (1.753 m)   Wt 120.2 kg (264 lb 15.9 oz)   SpO2 95%   BMI 39.13 kg/m²     Labs Reviewed and Include    Recent Labs   Lab 24  0040   *   CALCIUM 8.0*   ALBUMIN 1.6*   PROT 6.5      K 4.5   CO2 20*      BUN 31*   CREATININE 1.7*   ALKPHOS 70   ALT 8*   AST 23   BILITOT 0.2     Lab Results   Component Value Date    WBC 11.48 2024    HGB 7.5 (L) 2024    HCT 24.6 (L) 2024    MCV 81 (L) 2024     2024     No results for input(s): "TSH", "FREET4" in the last 168 hours.  Lab Results   Component Value Date    HGBA1C >14.0 (H) 2024       Nutritional status:   Body mass index is 39.13 kg/m².  Lab Results   Component Value Date    ALBUMIN 1.6 (L) 2024    ALBUMIN 1.7 (L) 2024    ALBUMIN 1.8 (L) 2024     Lab Results   Component Value Date    PREALBUMIN 17 (L) 2014    PREALBUMIN 16 (L) 2014    PREALBUMIN 19 (L) 2014       Estimated Creatinine Clearance: 48.1 mL/min (A) (based on SCr of 1.7 mg/dL (H)).    Accu-Checks  Recent Labs     24  1213 24  1610 24  2218 24  0736 24  0758 24  1242 24  1600 24  1836 24  2148 24  0819   POCTGLUCOSE 200* 159* 128* 130* 132* 174* 251* 250* 248* 262*       Current Medications and/or Treatments Impacting Glycemic " Control  Immunotherapy:    Immunosuppressants       None          Steroids:   Hormones (From admission, onward)      Start     Stop Route Frequency Ordered    03/05/24 1536  melatonin tablet 6 mg         -- Oral Nightly PRN 03/05/24 1437          Pressors:    Autonomic Drugs (From admission, onward)      None          Hyperglycemia/Diabetes Medications:   Antihyperglycemics (From admission, onward)      Start     Stop Route Frequency Ordered    03/27/24 1015  insulin aspart U-100 pen 0-5 Units         -- SubQ Every 4 hours PRN 03/27/24 0908    03/24/24 2100  insulin detemir U-100 (Levemir) pen 13 Units         -- SubQ 2 times daily 03/24/24 0929    03/24/24 0715  insulin aspart U-100 pen 4-6 Units         -- SubQ with breakfast 03/23/24 0913    03/20/24 1645  insulin aspart U-100 pen 2-4 Units         -- SubQ with dinner 03/20/24 0644    03/20/24 1130  insulin aspart U-100 pen 3-5 Units         -- SubQ with lunch 03/20/24 0644

## 2024-03-28 NOTE — PLAN OF CARE
[facility] - Tele[facility] Stepdown  Discharge Reassessment    Primary Care Provider: [facility] -    Expected Discharge Date: 3/29/2024    Reassessment (most recent)       Discharge Reassessment - 03/28/24 1636          Discharge Reassessment    Assessment Type Discharge Planning Reassessment     Did the patient's condition or plan change since previous assessment? No     Discharge Plan discussed with: Patient     Communicated OXANA with patient/caregiver Date not available/Unable to determine     Discharge Plan A Rehab   TBD    Discharge Plan B Skilled Nursing Facility   TBD    DME Needed Upon Discharge  --   TBD    Why the patient remains in the hospital Requires continued medical care        Post-Acute Status    Post-Acute Placement Status --   Per [facility] pt was accepted at [facility] before AKA    Discharge Delays None known at this time                 Pt not medically clear for discharge. Post opt day 1 L AKA. CM dept will continue following treatment team for recommendations/needs.     Interval History: ALBERTO. Endorses pain on movement of amputated leg, controlled w/ tylenol + dilaudid     Discharge Plan A and Plan B have been determined by review of patient's clinical status, future medical and therapeutic needs, and coverage/benefits for post-acute care in coordination with multidisciplinary team members.    Viviane Ray, AMAYA   [facility]    Case Management Dept  [phone]

## 2024-03-28 NOTE — PROGRESS NOTES
[facility] - Telemetry [facility] Medicine  Progress Note    Patient Name: Saji Castañeda  MRN: [id number]  Patient Class: IP- Inpatient   Admission Date: 3/5/2024  Length of Stay: 23 days  Attending Physician: Jm Rosa MD  Primary Care Provider: [facility] -        Subjective:     Principal Problem:Osteomyelitis of left lower extremity        HPI:  Saji Castañeda is a 74 y.o. male with a medical history of DM2, HLD, HTN, chronic osteomyelitis of L heel, L TMA, PAD, hx of ESBL klebsiella, acinetobacter bacteremia, presenting with hyperglycemia. He presented to the ED via EMS and his POCT glucose on arrival was >500. Serum blood glucose 667 with anion gap of 17 and elevated ketones. Serum potassium 3.4, corrected sodium 149. Urinalysis significant for RBCs, glucosuria, and moderate bacteria and yeast. CBC revealed leukocytosis, elevated platelets, and mild anemia. CMP shows Na 135, K 3.4, BUN 29, Cr 2.1, Glucose 677, Lactate 4.61. BNP and troponin elevated. GFR 32.2. Insulin drip, IV fluids, zosyn, vancomycin, and potassium given.      He is unable to provide much history, but states that he has not been taking his home medications for at least a week. He reports shortness of breath, fatigue, and weakness for the last 6-7 days. He has chills and reports episodes of emesis. He denies abdominal pain, chest pain, palpitations, recent illness/infection, fevers, changes to bowel movements and urinary output. Patient lives with his brother and sister at home. He was last seen in the ED on 2/21 after a fall. He was hypoglycemic BGL 60 at that time which returned to normal after eating. He was also scheduled for a wound clinic appointment today 3/5 at [facility] with Dr. Wilson.        Overview/Hospital Course:  Wound care and cultures collected by Podiatry with recs for Vascular consult for amputation. Patient declined amputation. ID recommending IV Ertapenem for chronic osteomyelitis for 6 week duration  (EED: 4/15/24). With high wound care needs and IV antibiotics plan patient needing long-term placement. He is being treated with topical permethrin for bedbugs. Wound care has been dressing the wounds. 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 Still adjusting insulin to control sugars better. Giving fluids for YNES. Poor PO intake even with feeding assistance. LTAC authorization denied by [employer]. Waiting for SNF placement ([facility], awaiting auth and family to complete paperwork). Patient started spiking fever again and infectious work up done. ID re-consulted. Patient has been counseled multiple times that source control cannot be achieved without amputation. Patient agreeable to AKA scheduled for 3/27. Transitioned to heparin from eliquis. Fung placed for urinary rentention. Discussed new hematuria with urology, hematuria resolved with repositioning of fung. Pt to receive 1u PRBC preoperatively per vascular surgery. S/p L AKA on 3/27 with vascular surgery. ID rec 24-48h post-op abx now that source control is achieved, unless further concerned for infection. Will resume AC tentatively 3/29 if hgb stable and drain looks benign. Delirium precautions + trazodone added for sleep hygiene, rescheduled lab times appropriately.      Interval History: NAEON. Endorses pain on movement of amputated leg, controlled w/ tylenol + dilaudid      Objective:     Vital Signs (Most Recent):  Temp: 98.3 °F (36.8 °C) (03/28/24 1112)  Pulse: 80 (03/28/24 1112)  Resp: 17 (03/28/24 1112)  BP: 128/60 (03/28/24 1112)  SpO2: (!) 93 % (03/28/24 1112) Vital Signs (24h Range):  Temp:  [97.9 °F (36.6 °C)-98.5 °F (36.9 °C)] 98.3 °F (36.8 °C)  Pulse:  [79-86] 80  Resp:  [17-20] 17  SpO2:  [90 %-95 %] 93 %  BP: (107-129)/(59-67) 128/60     Weight: 120.2 kg (264 lb 15.9 oz)  Body mass index is 39.13 kg/m².    Intake/Output Summary (Last 24 hours) at 3/28/2024 7362  Last data filed at  3/28/2024 0621  Gross per 24 hour   Intake --   Output 720 ml   Net -720 ml         Physical Exam  Constitutional:       General: He is not in acute distress.     Appearance: Normal appearance. He is well-developed. Ill appearance: chronically ill appearing.   HENT:      Head: Normocephalic and atraumatic.      Right Ear: External ear normal.      Left Ear: External ear normal.      Nose: Nose normal.   Eyes:      General:         Right eye: No discharge.         Left eye: No discharge.      Conjunctiva/sclera: Conjunctivae normal.   Cardiovascular:      Rate and Rhythm: Normal rate and regular rhythm.      Pulses: Normal pulses.   Pulmonary:      Effort: Pulmonary effort is normal. No respiratory distress.      Breath sounds: No stridor.   Abdominal:      General: Abdomen is flat.      Palpations: Abdomen is soft.      Tenderness: There is no abdominal tenderness.   Musculoskeletal:         General: Swelling and deformity present.      Cervical back: Normal range of motion.      Comments: S/p L-AKA, bandage CDI  RUE swelling d/t DVT   Skin:     General: Skin is warm and dry.   Neurological:      General: No focal deficit present.      Mental Status: He is alert.             Significant Labs: All pertinent labs within the past 24 hours have been reviewed.    Significant Imaging: I have reviewed all pertinent imaging results/findings within the past 24 hours.    Assessment/Plan:      * Osteomyelitis of left lower extremity  Patient with Proteus and enterobacter on leg cultures collected by Podiatry with concern for acute on chronic osteo. Fungal culture grew fusarium. He has started spiking fever and still is not agreeable to amputation, will do infectious work up to look out for source.    -UA noninfectious   -Blood culture NGTD  - Added voriconazole for fusarium coverage which grew on fungal culture - 3 months  - midline catheters placed on admission for difficult IV access  - Per ID consult note 3/28: Source  control achieved with proximal amputation. Can discontinue antimicrobials 24-48 hours after surgery as long term treatment is not indicated.       Urinary retention  Patient developed new urinary retention.     -Fung's placed   -Flomax  -Renal ultrasound showed no hydronephrosis  - Making adequate urine with fung, c/f hematuria which developed 3/25 and resolved with repositioning of fung. See urology note from same date  - voiding trial prior to DC. If unable to void, urology f/u outpatient       BPH (benign prostatic hyperplasia)  Fung in placed, placed by urology  Flomax daily      Localized swelling of right upper extremity  Patient has swelling and pain in the right arm and some swelling in the left arm as well    -US b/l upper extremity for DVT showed clot  - Will resume AC 3/29 per vasc surg if hgb stable and drain looks benign    History of Bedbug bite with infection  Patient started on Permethrin daily for 5 days starting 3/12      Proteus infection    On ertapenem to cover proteus and enterobacter from left leg wound cx, continue for 24-48h post-op per ID    Chronic anticoagulation  - See DVT      History of amputation of left high mid foot   See osteo      Stage 3b chronic kidney disease  Creatine stable for now. BMP reviewed- noted Estimated Creatinine Clearance: 51.1 mL/min (A) (based on SCr of 1.6 mg/dL (H)). according to latest data. Based on current GFR, CKD stage is stage 3 - GFR 30-59.  Monitor UOP and serial BMP and adjust therapy as needed. Renally dose meds. Avoid nephrotoxic medications and procedures.    Severe sepsis  Source control achieved with proximal amputation. Per ID can discontinue antimicrobials 24-48 hours after surgery as long term treatment is not indicated    This patient does have evidence of infective focus  My overall impression is sepsis.  Source: Skin and Soft Tissue (location left leg)  Antibiotics given-   Antibiotics (72h ago, onward)      Start     Stop Route  "Frequency Ordered    03/08/24 1230  ertapenem (INVANZ) 1 g in sodium chloride 0.9 % 100 mL IVPB (MB+)         -- IV Every 24 hours (non-standard times) 03/08/24 1115          Latest lactate reviewed-  No results for input(s): "LACTATE", "POCLAC" in the last 72 hours.    Organ dysfunction indicated by Acute kidney injury    Fluid challenge Actual Body weight- Patient will receive 30ml/kg actual body weight to calculate fluid bolus for treatment of septic shock.     Post- resuscitation assessment No - Post resuscitation assessment not needed       Will Not start Pressors-     - See osteo      Other hyperlipidemia  Continue home atorvastatin      Diabetic ketoacidosis without coma associated with type 2 diabetes mellitus  Patient's FSGs are uncontrolled due to hyperglycemia on current medication regimen.  Last A1c reviewed-   Lab Results   Component Value Date    HGBA1C >14.0 (H) 03/05/2024     Most recent fingerstick glucose reviewed-   Recent Labs   Lab 03/27/24  1600 03/27/24  1836 03/27/24  2148 03/28/24  0819   POCTGLUCOSE 251* 250* 248* 262*       Current correctional scale   LD SSI  Maintain anti-hyperglycemic dose as follows-   Antihyperglycemics (From admission, onward)      Start     Stop Route Frequency Ordered    03/27/24 1015  insulin aspart U-100 pen 0-5 Units         -- SubQ Every 4 hours PRN 03/27/24 0908    03/24/24 2100  insulin detemir U-100 (Levemir) pen 13 Units         -- SubQ 2 times daily 03/24/24 0929    03/24/24 0715  insulin aspart U-100 pen 4-6 Units         -- SubQ with breakfast 03/23/24 0913    03/20/24 1645  insulin aspart U-100 pen 2-4 Units         -- SubQ with dinner 03/20/24 0644    03/20/24 1130  insulin aspart U-100 pen 3-5 Units         -- SubQ with lunch 03/20/24 0644          Hold Oral hypoglycemics while patient is in the hospital.  Will keep patient on DKA pathway with insulin drip and fluid with protocol in place for switching to subcutaneous insulin as anion gap " closes.      DKA    - Resolved  - Continue insulin regimen and BG checks TIDWM and QHS and 2am  - patient tends to become hypoglycemic around dinnertime  - could be insulin stacking from breakfast and lunch insulin aspart given poor PO intake despite assistance with feeding   - detemir 14 unit BID  - aspart scheduled w/ only breakfast and lunch and dinner with different units  -Inpatient consult to Endocrine    Paroxysmal atrial fibrillation  Patient with Persistent (7 days or more) atrial fibrillation which is uncontrolled currently with    . Patient is currently in sinus rhythm.[id number] Score: 4. . Anticoagulation indicated. Anticoagulation done with heparin/eliquis .    -resume AC 3/29 if hgb stable and drain looks benign    Chronic deep vein thrombosis (DVT) of right upper extremity  - eliquis transitioned to Heparin which was paused temporarily d/t concern of new hematuria which developed 3/25. Discussed with urology, the hematuria resolved with repositioning of the fung. Heparin resumed, but will temporarily be paused as patient needs preoperative blood transfusion and has minimal IV access d/t placement difficulties & limb alert on R-arm d/t DVT      - AC plan detailed above      Iron deficiency anemia  Patient's anemia is currently uncontrolled. Has received 2 units of PRBCs on 3/23 and 3/16 . Etiology likely d/t chronic blood loss and Iron deficiency. Receiving 1u on 3/26 preoperatively    Current CBC reviewed-   Lab Results   Component Value Date    HGB 7.9 (L) 03/28/2024    HCT 25.8 (L) 03/28/2024     Monitor serial CBC and transfuse if patient becomes hemodynamically unstable, symptomatic or H/H drops below 7/21.    Cellulitis of left lower leg  See osteo      Peripheral arterial disease  Resume plavix pending vasc sx recs      YNES (acute kidney injury)  Resolved    Patient with acute kidney injury/acute renal failure likely due to pre-renal azotemia due to dehydration and post-obstructive d/t urinary  retention  YNES is currently stable. Baseline creatinine  1.1  - Labs reviewed- Renal function/electrolytes with Estimated Creatinine Clearance: 51.1 mL/min (A) (based on SCr of 1.6 mg/dL (H)). according to latest data. Monitor urine output and serial BMP and adjust therapy as needed. Avoid nephrotoxins and renally dose meds for GFR listed above.    Creatinine 2.1 on admit, baseline around 1.1  - prerenal vs obstructive (fung placed)     Lab Results   Component Value Date    CREATININE 1.6 (H) 03/28/2024       Plan:   - Strict I&Os and daily weights   - Daily BMP  - Trend sCr  - Avoid nephrotoxic agents such as NSAIDs, ACEi, ARBs and IV radiocontrast.  - Renally dose meds to current GFR.  - Maintain MAP > 65.  - No indications for HD at this time.   - Maintain electrolytes at goal: Mg > 2, Phos > 3, K > 4.           Insulin dependent type 2 diabetes mellitus  Patient's FSGs are uncontrolled due to hyperglycemia on current medication regimen.  Last A1c reviewed-   Lab Results   Component Value Date    HGBA1C >14.0 (H) 03/05/2024     Most recent fingerstick glucose reviewed-   Recent Labs   Lab 03/27/24  1600 03/27/24  1836 03/27/24  2148 03/28/24  0819   POCTGLUCOSE 251* 250* 248* 262*       Current correctional scale  Low  Increase anti-hyperglycemic dose as follows-   Antihyperglycemics (From admission, onward)      Start     Stop Route Frequency Ordered    03/27/24 1015  insulin aspart U-100 pen 0-5 Units         -- SubQ Every 4 hours PRN 03/27/24 0908    03/24/24 2100  insulin detemir U-100 (Levemir) pen 13 Units         -- SubQ 2 times daily 03/24/24 0929    03/24/24 0715  insulin aspart U-100 pen 4-6 Units         -- SubQ with breakfast 03/23/24 0913    03/20/24 1645  insulin aspart U-100 pen 2-4 Units         -- SubQ with dinner 03/20/24 0644    03/20/24 1130  insulin aspart U-100 pen 3-5 Units         -- SubQ with lunch 03/20/24 0644          Hold Oral hypoglycemics while patient is in the hospital.    -  Endocrine's following an making insulin adjustments as needed.    Essential hypertension  Chronic, uncontrolled. Latest blood pressure and vitals reviewed-     Temp:  [97.9 °F (36.6 °C)-98.5 °F (36.9 °C)]   Pulse:  [79-86]   Resp:  [17-20]   BP: (107-129)/(59-67)   SpO2:  [90 %-95 %] .   Home meds for hypertension were reviewed and noted below.   Hypertension Medications               furosemide (LASIX) 40 MG tablet Take 20 mg by mouth.    hydrALAZINE (APRESOLINE) 100 MG tablet Take 1 tablet (100 mg total) by mouth every 8 (eight) hours.    isosorbide dinitrate (ISORDIL) 10 MG tablet Take 1 tablet (10 mg total) by mouth 3 (three) times daily.    NIFEdipine (PROCARDIA-XL) 60 MG (OSM) 24 hr tablet Take 1 tablet (60 mg total) by mouth once daily.            While in the hospital, will manage blood pressure as follows; Adjust home antihypertensive regimen as follows- will keep nifedipine - increased to 90     Will utilize p.r.n. blood pressure medication only if patient's blood pressure greater than 180/110 and he develops symptoms such as worsening chest pain or shortness of breath.      VTE Risk Mitigation (From admission, onward)           Ordered     heparin 25,000 units in dextrose 5% 250 mL (100 units/mL) infusion HIGH INTENSITY nomogram - [facility]  Continuous        Question:  Begin at (units/kg/hr)  Answer:  18    03/20/24 0747                    Discharge Planning   OXANA: 3/29/2024     Code Status: Full Code   Is the patient medically ready for discharge?: No    Reason for patient still in hospital (select all that apply): Patient trending condition  Discharge Plan A: Skilled Nursing Facility   Discharge Delays: None known at this time              Jori Byrd MD  Department of Hospital Medicine   [facility] - Telemetry Stepdown

## 2024-03-28 NOTE — ASSESSMENT & PLAN NOTE
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 Patient febrile today and WBC 11.67. Xray L foot shows acute OM to calcaneus. Patient was previously not amendable to AKA but has now agreed to continue with amputation. S/p L AKA 3/27    - S/p L AKA w/ EDMUNDO drain intact. Dressing c/d/i   - Dressing change tomorrow 3/29  - Stump protector ordered and will be delivered bedside  - Do not restart any anticoagulants until POD2  - Recommend BG <180 to lessen risk of infection of amputation site leading to more proximal amputation or death  - Recommend PT/OT  - Okay to d/c bedrest  - Vascular surgery will continue to follow

## 2024-03-28 NOTE — PT/OT/SLP PROGRESS
Physical Therapy [id number]    Patient Name:  Saji Castañeda   MRN:  [id number]    Recommendations:     Discharge Recommendations: Moderate Intensity Therapy   Discharge Equipment Recommendations: hospital bed, lift device (pressure relief mattress)   Barriers to discharge: Decreased caregiver support and Increased skilled assistance required    Assessment:   Co-evaluation/treatment performed due to patient's multiple deficits requiring two skilled therapists to appropriately and safely assess patient's strength, endurance, functional mobility, and ADL performance while facilitating functional tasks in addition to accommodating for patient's activity tolerance and medical acuity.    Saji Castañeda is a 74 y.o. male admitted with a medical diagnosis of Osteomyelitis of left lower extremity.  He presents with the following impairments/functional limitations: weakness, pain, orthopedic precautions, impaired endurance, impaired sensation, impaired functional mobility, impaired balance, decreased lower extremity function, decreased ROM, impaired joint extensibility, impaired muscle length, impaired coordination, impaired skin, edema. Pain from remains primary barrier for functional mobility, evidenced by significantly limited re-evaluation; MD contacted. PT continues to recommend moderate intensity skilled physical therapy services post-acutely. Provided recommendation based upon needed intensity to directly address patient's previously listed functional impairments, positively impact patient's quality of life, and intervene on high risk for caregiver burnout.     Rehab Prognosis: Poor; patient would benefit from acute skilled PT services to address these deficits and reach maximum level of function.    Recent Surgery: Procedure(s) (LRB):  AMPUTATION, ABOVE KNEE (Left) 1 Day Post-Op    Plan:     During this hospitalization, patient to be seen 4 x/week to address the identified rehab impairments via therapeutic  "activities, therapeutic exercises, neuromuscular re-education and progress toward the following goals:    Plan of Care Expires:  04/28/24    Subjective     Chief Complaint: Pain  Patient/Family Comments/goals: Agreeable to therapy services  Pain/Comfort:  Pain Rating 1: 10/10  Location - Side 1: Bilateral  Location - Orientation 1: generalized  Location 1: gluteal  Pain Addressed 1: Reposition, Distraction, Cessation of Activity, Nurse notified, Pre-medicate for activity (MD notified)  Pain Rating Post-Intervention 1: 10/10    Pain Rating 2: 10/10  Location - Side 2: Left  Location - Orientation 2: upper  Location 2: leg  Pain Addressed 2: Pre-medicate for activity, Reposition, Distraction, Cessation of Activity, Nurse notified (MD notified)  Pain Rating Post-Intervention 2: 10/10    Patients cultural, spiritual, Latter-day conflicts given the current situation: no  Objective:     Communicated with RN prior to session.  Patient found HOB elevated with telemetry, pressure relief boots, fung catheter, EDMUNDO drain (midline catheter, step-wise bed)  upon PT entry to room.    General Precautions: Standard, fall, contact, diabetic   Orthopedic Precautions:LLE non weight bearing, RLE weight bearing as tolerated   Braces:  (Darco shoe (R))   Body mass index is 39.13 kg/m².  Oxygen Device: Room Air  Vitals: BP (!) 118/56 (BP Location: Left arm, Patient Position: Lying)   Pulse 81   Temp 98.3 °F (36.8 °C) (Axillary)   Resp 17   Ht 5' 9" (1.753 m)   Wt 120.2 kg (264 lb 15.9 oz)   SpO2 (!) 92%   BMI 39.13 kg/m²     Exams:  Cognition:   Alert and Cooperative  Command following: Follows one-step verbal commands  Skin Integrity: Intact wrap present on RLE to foot & on LLE covering new AKA  Postural Assessment: Significant trunk lean to R Limited to supine position  Physical Exam:   BLE ROM:  Global deficits Limited assessment d/t pain    BLE Strength: Unable to assess d/t pain    Functional Mobility:  Bed Mobility:   "   Scooting:   Lateral (L): TotalAx2 upper body only  Boosting: TotalAx2 with HOB Flat & in trendelenburg position. Patient demonstrating significant pain behaviors.  Supine>Sit: x3 attempts, however unable to complete 2/2 pain    AM-PAC 6 CLICK MOBILITY  Total Score:7     Treatment & Education:  Patient Education Provided on:  The role of physical therapy and how the patient can benefit from skilled services  The negative effects of prolonged bed rest/sedentary behavior   The importance of body in bed body posturing and repositioning for prevention of skin breakdown, contracture development, & additional aforementioned negative effects  The importance of patient participation with PT  Pt white board updated with current therapists name and level of mobility assistance needed.     Patient Verbalized understanding of all topics touched on this date. All patient questions answered within the PT scope of practice    Patient left HOB elevated with all lines intact, call button in reach, RN & MD notified    GOALS:   Multidisciplinary Problems       Physical Therapy Goals          Problem: Physical Therapy    Goal Priority Disciplines Outcome Goal Variances Interventions   Physical Therapy Goal     PT, PT/OT Ongoing, Progressing     Description: Goals to be met by: 24    Patient will increase functional independence with mobility by performin. Supine to sit with Maximum Assistance  2. Sit to supine with Maximum Assistance  3. Rolling to Left and Right with Maximum Assistance  4. Sitting at edge of bed 10 minutes with Moderate Assistance  5. Lower extremity exercise program x15 reps per handout, with assistance as needed                         History:     Past Medical History:   Diagnosis Date    Arthritis     legs    Bacteremia due to Gram-negative bacteria 3/23/2021    Diabetes mellitus     Diabetes mellitus, type 2     Hyperlipidemia     Hypertension     Osteomyelitis     Palliative care encounter  5/24/2023       Past Surgical History:   Procedure Laterality Date    ABOVE-KNEE AMPUTATION Left 3/27/2024    Procedure: AMPUTATION, ABOVE KNEE;  Surgeon: Adal Arellano MD;  Location: [facility] OR [address] [place] [address];  Service: Vascular;  Laterality: Left;    ANGIOGRAPHY OF LOWER EXTREMITY N/A 2/3/2021    Procedure: Angiogram Extremity Bilateral;  Surgeon: Ernst Chacko MD;  Location: [facility] OR [address] [place] [address];  Service: Peripheral Vascular;  Laterality: N/A;  7.4 mintues fluoroscopy time  816.15 mGy  170.17 Gycm2    AORTOGRAPHY WITH EXTREMITY RUNOFF Bilateral 2/3/2021    Procedure: AORTOGRAM, WITH EXTREMITY RUNOFF;  Surgeon: Ernst Chacko MD;  Location: [facility] OR [address] [place] [address];  Service: Peripheral Vascular;  Laterality: Bilateral;    DEBRIDEMENT OF FOOT Left 2/23/2021    Procedure: DEBRIDEMENT, LEFT HEEL;  Surgeon: Mayra Schroeder DPM;  Location: [facility] OR [address] [place] [address];  Service: Podiatry;  Laterality: Left;    FOOT AMPUTATION  October 2010    left high midfoot amputation    IMPLANTATION OF LEADLESS PACEMAKER N/A 10/12/2023    Procedure: [id number];  Surgeon: VANESSA Delatorre MD;  Location: [facility] EP LAB;  Service: Cardiology;  Laterality: N/A;  AVB, MICRA, EH, ANES, [place] [id number]       Time Tracking:     PT Received On: 03/28/24  PT Start Time: 1258     PT Stop Time: 1321  PT Total Time (min): 23 min     Billable Minutes: Re-eval 23 03/28/2024

## 2024-03-28 NOTE — ASSESSMENT & PLAN NOTE
Patient with Proteus and enterobacter on leg cultures collected by Podiatry with concern for acute on chronic osteo. Fungal culture grew fusarium. He has started spiking fever and still is not agreeable to amputation, will do infectious work up to look out for source.    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 Can discontinue antimicrobials 24-48 hours after surgery as long term treatment is not indicated.

## 2024-03-28 NOTE — SUBJECTIVE & OBJECTIVE
Past Medical History:   Diagnosis Date    Arthritis     legs    Bacteremia due to Gram-negative bacteria 3/23/2021    Diabetes mellitus     Diabetes mellitus, type 2     Hyperlipidemia     Hypertension     Osteomyelitis     Palliative care encounter 5/24/2023       Past Surgical History:   Procedure Laterality Date    ABOVE-KNEE AMPUTATION Left 3/27/2024    Procedure: AMPUTATION, ABOVE KNEE;  Surgeon: Adal Arellano MD;  Location: [facility] OR [address] [place] [address];  Service: Vascular;  Laterality: Left;    ANGIOGRAPHY OF LOWER EXTREMITY N/A 2/3/2021    Procedure: Angiogram Extremity Bilateral;  Surgeon: Ernst Chacko MD;  Location: [facility] OR [address] [place] [address];  Service: Peripheral Vascular;  Laterality: N/A;  7.4 mintues fluoroscopy time  816.15 mGy  170.17 Gycm2    AORTOGRAPHY WITH EXTREMITY RUNOFF Bilateral 2/3/2021    Procedure: AORTOGRAM, WITH EXTREMITY RUNOFF;  Surgeon: Ernst Chacko MD;  Location: [address];  Service: Peripheral Vascular;  Laterality: Bilateral;    DEBRIDEMENT OF FOOT Left 2/23/2021    Procedure: DEBRIDEMENT, LEFT HEEL;  Surgeon: Mayra Schroeder DPM;  Location: [address];  Service: Podiatry;  Laterality: Left;    FOOT AMPUTATION  October 2010    left high midfoot amputation    IMPLANTATION OF LEADLESS PACEMAKER N/A 10/12/2023    Procedure: [id number];  Surgeon: VANESSA Delatorre MD;  Location: [facility] EP LAB;  Service: Cardiology;  Laterality: N/A;  AVB, MICRA, EH, [place]ES, [place] [id number]       Review of patient's allergies indicates:  No Known Allergies    Medications:  Medications Prior to Admission   Medication Sig    acetaminophen (TYLENOL) 325 MG tablet Take 650 mg by mouth every 6 (six) hours as needed for Temperature greater than.    acidophilus-pectin, citrus 100 million cell-10 mg Cap Take 1 capsule by mouth 2 (two) times a day.    ascorbic acid, vitamin C, (VITAMIN C) 500 MG tablet Take 500 mg by mouth 2 (two) times daily.    B COMPLEX-VITAMIN B12 tablet Take  "1 tablet by mouth once daily.    BD AUTOSHIELD DUO PEN NEEDLE 30 gauge x 3/16" Ndle     BD ULTRA-FINE ADITYA PEN NEEDLE 32 gauge x 5/32" Ndle USE FOUR TIMES DAILY WITH MEALS AND NIGHTLY    blood sugar diagnostic (CONTOUR TEST STRIPS) Strp Inject 1 strip into the skin 2 (two) times daily before meals.    clopidogreL (PLAVIX) 75 mg tablet Take 1 tablet (75 mg total) by mouth once daily.    gabapentin (NEURONTIN) 300 MG capsule Take 300 mg by mouth 2 (two) times daily.    insulin aspart U-100 (NOVOLOG) 100 unit/mL (3 mL) InPn pen Inject 6 Units into the skin 3 (three) times daily with meals.    MICROLET LANCET Misc     multivitamin (THERAGRAN) per tablet Take 1 tablet by mouth once daily.    NIFEdipine (PROCARDIA-XL) 60 MG (OSM) 24 hr tablet Take 1 tablet (60 mg total) by mouth once daily.    pantoprazole (PROTONIX) 40 MG tablet Take 40 mg by mouth once daily. Before breakfast    pen needle, diabetic 32 gauge x 5/32" Ndle use as directed with insulin    rosuvastatin (CRESTOR) 40 MG Tab Take 40 mg by mouth once daily.    tamsulosin (FLOMAX) 0.4 mg Cap Take 0.4 mg by mouth once daily.    triamcinolone acetonide 0.025% (KENALOG) 0.025 % Oint Apply topically 2 (two) times daily as needed (to affected area).    [DISCONTINUED] apixaban (ELIQUIS) 5 mg Tab Take 1 tablet (5 mg total) by mouth 2 (two) times daily.    [DISCONTINUED] diphenhydrAMINE (BENADRYL) 25 mg capsule No directions listed on the patients medication list.    [DISCONTINUED] furosemide (LASIX) 40 MG tablet Take 20 mg by mouth.    [DISCONTINUED] glipiZIDE (GLUCOTROL) 10 MG tablet Take 20 mg by mouth 2 (two) times a day.    [DISCONTINUED] hydrALAZINE (APRESOLINE) 100 MG tablet Take 1 tablet (100 mg total) by mouth every 8 (eight) hours.    [DISCONTINUED] isosorbide dinitrate (ISORDIL) 10 MG tablet Take 1 tablet (10 mg total) by mouth 3 (three) times daily.    [DISCONTINUED] LANTUS SOLOSTAR U-100 INSULIN glargine 100 units/mL SubQ pen Inject 45 Units into the skin " every evening. (Patient taking differently: Inject 45 Units into the skin once daily.)    [DISCONTINUED] miconazole NITRATE 2 % (MICOTIN) 2 % top powder Apply topically 2 (two) times daily.    [DISCONTINUED] oxyCODONE 5 mg TbOr Take 1 tablet by mouth every 6 (six) hours as needed (Pain (Max 5 daily)).    [DISCONTINUED] sodium hypochlorite 0.5 % (DAKIN'S SOLUTION) external solution Apply topically once daily.     Antibiotics (From admission, onward)      Start     Stop Route Frequency Ordered    03/08/24 1230  ertapenem (INVANZ) 1 g in sodium chloride 0.9 % 100 mL IVPB (MB+)         -- IV Every 24 hours (non-standard times) 03/08/24 1115          Antifungals (From admission, onward)      Start     Stop Route Frequency Ordered    03/15/24 1045  voriconazole tablet 200 mg         -- Oral 2 times daily 03/15/24 0936          Antivirals (From admission, onward)      None             Immunization History   Administered Date(s) Administered    COVID-19, MRNA, LN-S, PF (MODERNA FULL 0.5 ML DOSE) 05/24/2021, 07/13/2021    Influenza - Trivalent (ADULT) 10/08/2020    PPD Test 12/09/2020, 03/02/2021, 06/21/2022, 03/18/2024    Pneumococcal Conjugate - 13 Valent 10/16/2018       Family History       Problem Relation (Age of Onset)    Cancer Brother    Diabetes Mother, Sister    Heart disease Mother    Stroke Sister          Social History     Socioeconomic History    Marital status: Single   Tobacco Use    Smoking status: Never    Smokeless tobacco: Never   Substance and Sexual Activity    Alcohol use: No     Comment: occassional    Drug use: No    Sexual activity: Not Currently   Social History Narrative    Not currently working; lives with family     Social Determinants of Health     Financial Resource Strain: Medium Risk (3/5/2024)    Overall Financial Resource Strain (CARDIA)     Difficulty of Paying Living Expenses: Somewhat hard   Food Insecurity: No Food Insecurity (3/5/2024)    Hunger Vital Sign     Worried About Running  Out of Food in the Last Year: Never true     Ran Out of Food in the Last Year: Never true   Transportation Needs: No Transportation Needs (3/5/2024)    PRAPARE - Transportation     Lack of Transportation (Medical): No     Lack of Transportation (Non-Medical): No   Physical Activity: Inactive (3/5/2024)    Exercise Vital Sign     Days of Exercise per Week: 0 days     Minutes of Exercise per Session: 0 min   Stress: No Stress Concern Present (3/5/2024)    [employer] - Occupational Stress Questionnaire     Feeling of Stress : Only a little   Social Connections: Socially Isolated (3/5/2024)    Social Connection and Isolation Panel [NHANES]     Frequency of Communication with Friends and Family: More than three times a week     Frequency of Social Gatherings with Friends and Family: Patient declined     Attends Spiritism Services: Never     Active Member of Clubs or Organizations: No     Attends Club or Organization Meetings: Never     Marital Status: Never    Housing Stability: Low Risk  (3/5/2024)    Housing Stability Vital Sign     Unable to Pay for Housing in the Last Year: No     Number of Places Lived in the Last Year: 1     Unstable Housing in the Last Year: No     Review of Systems   Constitutional:  Negative for chills, diaphoresis and fever.   Respiratory:  Negative for shortness of breath.    Cardiovascular:  Negative for chest pain.   Gastrointestinal:  Negative for abdominal pain, diarrhea, nausea and vomiting.   Genitourinary:  Negative for dysuria and hematuria.   Musculoskeletal:  Positive for myalgias.   Skin:  Positive for color change and wound.     Objective:     Vital Signs (Most Recent):  Temp: 98.3 °F (36.8 °C) (03/28/24 1112)  Pulse: 80 (03/28/24 1112)  Resp: 17 (03/28/24 1112)  BP: 128/60 (03/28/24 1112)  SpO2: (!) 93 % (03/28/24 1112) Vital Signs (24h Range):  Temp:  [97.9 °F (36.6 °C)-98.5 °F (36.9 °C)] 98.3 °F (36.8 °C)  Pulse:  [79-86] 80  Resp:  [17-20]  17  SpO2:  [90 %-95 %] 93 %  BP: (107-129)/(59-67) 128/60     Weight: 120.2 kg (264 lb 15.9 oz)  Body mass index is 39.13 kg/m².    Estimated Creatinine Clearance: 51.1 mL/min (A) (based on SCr of 1.6 mg/dL (H)).     Physical Exam  Constitutional:       General: He is not in acute distress.     Appearance: Normal appearance. He is well-developed. He is not ill-appearing or diaphoretic.   HENT:      Head: Normocephalic and atraumatic.      Right Ear: External ear normal.      Left Ear: External ear normal.      Nose: Nose normal.   Eyes:      General: No scleral icterus.        Right eye: No discharge.         Left eye: No discharge.      Extraocular Movements: Extraocular movements intact.      Conjunctiva/sclera: Conjunctivae normal.   Pulmonary:      Effort: Pulmonary effort is normal. No respiratory distress.      Breath sounds: No stridor.   Skin:     General: Skin is dry.      Coloration: Skin is not jaundiced or pale.      Findings: No erythema.   Neurological:      General: No focal deficit present.      Mental Status: He is alert and oriented to person, place, and time. Mental status is at baseline.   Psychiatric:         Mood and Affect: Mood normal.         Behavior: Behavior normal.         Thought Content: Thought content normal.         Judgment: Judgment normal.          Significant Labs: Blood Culture:   Recent Labs   Lab 10/02/23  1701 10/06/23  1131 10/06/23  1133 03/05/24  0800 03/19/24  1214   LABBLOO Gram stain aer bottle: Gram negative rods  Results called to and read back by:Alejandra Figueroa RN 10/03/2023  06:29  Gram stain jumana bottle:Gram positive cocci in clusters resembling Staph  Results called to and read back by:Mauro Prado Rn 10/07/2023  00:39  Reviewed by PhD Supervisor, Supervisor, or designee 10/04/2023  13:53  ACINETOBACTER BAUMANNII   further identified as Acinetobacter baumannii nosocomialis group  For susceptibility see order #[id number]  *  COAGULASE-NEGATIVE  STAPHYLOCOCCUS SPECIES  Organism is a probable contaminant  *  Gram stain aer bottle: Gram negative rods  Gram stain jumana bottle: Gram negative rods  Results called to and read back by:Alejandra Figueroa RN 10/03/2023  06:29  Gram stain jumana bottle: Gram positive cocci in clusters resembling Staph  Results called to and read back by: Ricky Munguia RN 10/05/2023  06:53  KLEBSIELLA PNEUMONIAE ESBL*  ACINETOBACTER BAUMANNII   further identified as Acinetobacter baumannii nosocomialis group  * No growth after 5 days. No growth after 5 days. No growth after 5 days.  No growth after 5 days. No growth after 5 days.  No growth after 5 days.     CBC:   Recent Labs   Lab 03/28/24  0040 03/28/24  0940   WBC 11.48 11.00   HGB 7.5* 7.9*   HCT 24.6* 25.8*    452*     CMP:   Recent Labs   Lab 03/28/24  0040 03/28/24  0940    143   K 4.5 4.2    111*   CO2 20* 22*   * 228*   BUN 31* 34*   CREATININE 1.7* 1.6*   CALCIUM 8.0* 8.6*   PROT 6.5 6.9   ALBUMIN 1.6* 1.7*   BILITOT 0.2 0.2   ALKPHOS 70 73   AST 23 27   ALT 8* 8*   ANIONGAP 12 10     Microbiology Results (last 7 days)       Procedure Component Value Units Date/Time    Blood culture [[id number]] Collected: 03/19/24 1214    Order Status: Completed Specimen: Blood Updated: 03/24/24 1412     Blood Culture, Routine No growth after 5 days.    Blood culture [[id number]] Collected: 03/19/24 1214    Order Status: Completed Specimen: Blood Updated: 03/24/24 1412     Blood Culture, Routine No growth after 5 days.          Recent Lab Results  (Last 5 results in the past 24 hours)        03/28/24  0940   03/28/24  0819   03/28/24  0040   03/27/24  2148   03/27/24  1836        Albumin 1.7     1.6           ALP 73     70           ALT 8     8           Anion Gap 10     12           AST 27     23           Baso # 0.01               Basophil % 0.1               BILIRUBIN TOTAL 0.2  Comment: For infants and newborns, interpretation of results should be  based  on gestational age, weight and in agreement with clinical  observations.    Premature Infant recommended reference ranges:  Up to 24 hours.............<8.0 mg/dL  Up to 48 hours............<12.0 mg/dL  3-5 days..................<15.0 mg/dL  6-29 days.................<15.0 mg/dL       0.2  Comment: For infants and newborns, interpretation of results should be based  on gestational age, weight and in agreement with clinical  observations.    Premature Infant recommended reference ranges:  Up to 24 hours.............<8.0 mg/dL  Up to 48 hours............<12.0 mg/dL  3-5 days..................<15.0 mg/dL  6-29 days.................<15.0 mg/dL             BUN 34     31           Calcium 8.6     8.0           Chloride 111     110           CO2 22     20           Creatinine 1.6     1.7           Differential Method Automated               eGFR 44.7     41.5           Eos # 0.0               Eos % 0.1               Glucose 228     256           Gran # (ANC) 9.6               Gran % 86.9               Hematocrit 25.8     24.6           Hemoglobin 7.9     7.5           Immature Grans (Abs) 0.05  Comment: Mild elevation in immature granulocytes is non specific and   can be seen in a variety of conditions including stress response,   acute inflammation, trauma and pregnancy. Correlation with other   laboratory and clinical findings is essential.                 Immature Granulocytes 0.5               Lymph # 0.8               Lymph % 7.4               Magnesium  2.2               MCH 25.1     24.6           MCHC 30.6     30.5           MCV 82     81           Mono # 0.6               Mono % 5.0               MPV 9.4     9.3           nRBC 0               Phosphorus Level 3.5               Platelet Count 452     401           POCT Glucose   262     248   250       Potassium 4.2     4.5           PROTEIN TOTAL 6.9     6.5           RBC 3.15     3.05           RDW 16.3     16.3           Sodium 143     142           WBC  11.00     11.48                                  Significant Imaging:     Imaging Results              X-Ray Chest AP Portable (Final result)  Result time 03/05/24 09:57:35      Final result by Maddy Weeks MD (03/05/24 09:57:35)                   Impression:      Mild increased pulmonary vascularity centrally, not significantly changed from the prior study.      Electronically signed by: Maddy Weeks MD  Date:    03/05/2024  Time:    09:57               Narrative:    EXAMINATION:  XR CHEST AP PORTABLE    CLINICAL HISTORY:  Sepsis;    TECHNIQUE:  Single frontal view of the chest was performed.    COMPARISON:  10/12/2023    FINDINGS:  The cardiac silhouette is midline.  Leadless pacer.    The pulmonary vascularity is increased centrally.    No focal airspace disease.    No pleural effusion.  No pneumothorax.    The osseous structures appear normal.

## 2024-03-28 NOTE — ASSESSMENT & PLAN NOTE
Endocrinology consulted for BG management.   BG goal 140-180    - Levemir (Insulin Detemir) 13 units BID   - Novolog (Insulin Aspart) 4-6 units with breakfast, 3-5 lunch and 2-4 units with dinner and prn for BG excursions [facility] SSI (150/50) (range added due to fluctuating appetite)  - BG checks AC/HS  - Hypoglycemia protocol in place    ** Please notify Endocrine for any change and/or advance in diet**  ** Please call Endocrine for any BG related issues **    Discharge Planning:   TBD. Please notify endocrinology prior to discharge.  Plan Pending:  - Lantus 28 units nightly  - Novolog 5 units TIDWM  - Novolog SSI    150 - 200 + 2 unit    201 - 250 + 4 units    251 - 300 + 6 units    301 - 350 + 8 units       > 350   + 10 units  - Send logs in 3 days    Education:  Discharge Teaching:    Reviewed topics related to DM including: the need for insulin, how insulin works, what makes it a high risk medication, the importance of immediate follow up with either PCP or endocrine provider, importance of and how to check BG, how to record BG on logs, how to administer insulin, appropriate insulin administration sites, importance of rotating injection sites, hyper/hypoglycemia, how and when to treat hypoglycemia, when to hold insulin, how the correction scale works, importance of storing unused insulin in the refrigerator, and when to seek medical attention.  Patient verbalized understanding, answered all questions to patient's satisfaction. Blood sugar logs given to patient.     Hypoglycemia (Low Blood Sugar)  Too little glucose (sugar) in your blood is called hypoglycemia or low blood sugar. Diabetes itself doesnt cause low blood sugar. But some of the treatments for diabetes, such as pills or insulin, may increase your risk for it. Low blood sugar may cause you to lose consciousness or have a seizure. So always treat low blood sugar right away.    Special note: Always carry a source of fast-acting sugar and a snack in case of  hypoglycemia     What You May Notice  If you have low blood sugar, you may have these symptoms:  Shakiness or dizziness  Cold, clammy skin or sweating  Feelings of hunger  Headache  Nervousness  A hard, fast heartbeat  Weakness  Confusion or irritability  Blurred vision  What You Should Do  First, check your blood sugar. If it is too low (out of your target range), eat or drink 15 to 20 grams of fast-acting sugar. This may be 3 to 4 glucose tablets, 4 oz (half a cup) fruit juice or regular (non-diet) soda, 8 oz (one cup ) fat-free milk, or 1 tablespoon of honey. Dont take more than this, or your blood sugar may go too high.  Wait 15 minutes. Then recheck your blood sugar if you can.  If your blood sugar is still too low, repeat the steps above and check your blood sugar again. If your blood sugar still has not returned to your target range, contact your healthcare provider or seek emergency care.  Once your blood sugar returns to target range, eat a snack or meal.  Preventing Low Blood Sugar  Eat your meals and snacks at the same times each day. Dont skip meals!  Ask your healthcare provider if it is safe for you to drink alcohol. Never drink on an empty stomach.  Take your medication at the prescribed times.  Always carry a source of fast-acting sugar and a snack when youre away from home.  Other Things to Do  Carry a medical ID card or wear a medical alert bracelet or necklace. It should say that you have diabetes. It should also say what to do if you pass out or have a seizure.  Make sure your family, friends, and coworkers know the signs of low blood sugar. Tell them what to do if your blood sugar falls very low and you cant treat yourself.  Keep a glucagon emergency kit handy. Be sure your family, friends, and coworkers know how and when to use it. Check it regularly and replace the glucagon before it expires.  Talk to your healthcare team about other things you can do to prevent low blood sugar.    If you  experience hypoglycemia several times, call your doctor.   © 7101-0668 [employer], [address] [place] [address], [place] [address]. All rights reserved. This information is not intended as a substitute for professional medical care. Always follow your healthcare professional's instructions.

## 2024-03-28 NOTE — PLAN OF CARE
Patient AAOx3, reorient to time. L aka, bandage intact, no drainage, EDMUNDO suction bulb in place, 10ml sanguineous fluids noted. Denies pain/discomfort at time. PRN admin person orders during HS, effective. Patient declined am labs, after education and encouragement. No apparent distress noted, easily aroused by verbal stimuli. Plan of care in progress.     Problem: Diabetes Comorbidity  Goal: Blood Glucose Level Within Targeted Range  Outcome: Ongoing, Progressing     Problem: Adjustment to Illness (Sepsis/Septic Shock)  Goal: Optimal Coping  Outcome: Ongoing, Progressing     Problem: Bleeding (Sepsis/Septic Shock)  Goal: Absence of Bleeding  Outcome: Ongoing, Progressing     Problem: Glycemic Control Impaired (Sepsis/Septic Shock)  Goal: Blood Glucose Level Within Desired Range  Outcome: Ongoing, Progressing     Problem: Infection Progression (Sepsis/Septic Shock)  Goal: Absence of Infection Signs and Symptoms  Outcome: Ongoing, Progressing     Problem: Nutrition Impaired (Sepsis/Septic Shock)  Goal: Optimal Nutrition Intake  Outcome: Ongoing, Progressing     Problem: Infection  Goal: Absence of Infection Signs and Symptoms  Outcome: Ongoing, Progressing     Problem: Impaired Wound Healing  Goal: Optimal Wound Healing  Outcome: Ongoing, Progressing     Problem: Skin Injury Risk Increased  Goal: Skin Health and Integrity  Outcome: Ongoing, Progressing     Problem: Fall Injury Risk  Goal: Absence of Fall and Fall-Related Injury  Outcome: Ongoing, Progressing     Problem: Fluid and Electrolyte Imbalance (Acute Kidney Injury/Impairment)  Goal: Fluid and Electrolyte Balance  Outcome: Ongoing, Progressing

## 2024-03-28 NOTE — PROGRESS NOTES
[facility] - Telemetry Stepdown  Endocrinology  Progress Note    Admit Date: 3/5/2024     Reason for Consult: Management of T2DM, Hyperglycemia     Diabetes diagnosis year: > 25 years    Home Diabetes Medications:  Novolog 6 units w/ meals and Lantus 45 units nightly (states he is not taking).     How often checking glucose at home?  Not really checking    BG readings on regimen: 200's  Hypoglycemia on the regimen?  Yes (When he was taking the Lantus)  Missed doses on regimen?  Yes    Diabetes Complications include:     Hyperglycemia, Hypoglycemia , Diabetic chronic kidney disease     , Diabetic dermatitis, Foot ulcer  , and Periodontal disease    Complicating diabetes co morbidities:   HTN; HLD      HPI: Saji Castañeda is a 75 yo M with a history of Afib, T2D, HLD, HTN, chronic osteomyelitis of L heel, s/p L TMA, PID, ESBL Klebsiella and Acinetobacter bacteremia who was admitted after being found down at home and DKA. Endocrine consulted to manage hyperglycemia and type 2 diabetes.   Hemoglobin A1C   Date Value Ref Range Status   03/05/2024 >14.0 (H) 4.0 - 5.6 % Final     Comment:     ADA Screening Guidelines:  5.7-6.4%  Consistent with prediabetes  >or=6.5%  Consistent with diabetes    High levels of fetal hemoglobin interfere with the HbA1C  assay. Heterozygous hemoglobin variants (HbS, HgC, etc)do  not significantly interfere with this assay.   However, presence of multiple variants may affect accuracy.     09/13/2023 11.0 (H) 4.5 - 5.7 % Final   03/10/2023 8.4 (H) 4.0 - 5.6 % Final     Comment:     ADA Screening Guidelines:  5.7-6.4%  Consistent with prediabetes  >or=6.5%  Consistent with diabetes    High levels of fetal hemoglobin interfere with the HbA1C  assay. Heterozygous hemoglobin variants (HbS, HgC, etc)do  not significantly interfere with this assay.   However, presence of multiple variants may affect accuracy.     09/17/2022 9.9 (H) 4.0 - 5.6 % Final     Comment:     ADA Screening Guidelines:  5.7-6.4%   "Consistent with prediabetes  >or=6.5%  Consistent with diabetes    High levels of fetal hemoglobin interfere with the HbA1C  assay. Heterozygous hemoglobin variants (HbS, HgC, etc)do  not significantly interfere with this assay.   However, presence of multiple variants may affect accuracy.              Interval HPI:   No acute events overnight. Patient in room 8078/8078 A. Blood glucose stable. BG above goal on current insulin regimen (SSI, prandial, and basal insulin ). Steroid use- Dexamethasone  4 mg intra-op yesterday. BG running above goal overnight as AM levemir dose not given yesterday.      1 Day Post-Op  Renal function- Abnormal - 1.7 cr   Vasopressors-  None     Diet diabetic  Calorie     Eatin%  Nausea: No  Hypoglycemia and intervention: No  Fever: No  TPN and/or TF: No    /60 (BP Location: Left arm, Patient Position: Lying)   Pulse 85   Temp 98.3 °F (36.8 °C) (Oral)   Resp 20   Ht 5' 9" (1.753 m)   Wt 120.2 kg (264 lb 15.9 oz)   SpO2 95%   BMI 39.13 kg/m²     Labs Reviewed and Include    Recent Labs   Lab 24  0040   *   CALCIUM 8.0*   ALBUMIN 1.6*   PROT 6.5      K 4.5   CO2 20*      BUN 31*   CREATININE 1.7*   ALKPHOS 70   ALT 8*   AST 23   BILITOT 0.2     Lab Results   Component Value Date    WBC 11.48 2024    HGB 7.5 (L) 2024    HCT 24.6 (L) 2024    MCV 81 (L) 2024     2024     No results for input(s): "TSH", "FREET4" in the last 168 hours.  Lab Results   Component Value Date    HGBA1C >14.0 (H) 2024       Nutritional status:   Body mass index is 39.13 kg/m².  Lab Results   Component Value Date    ALBUMIN 1.6 (L) 2024    ALBUMIN 1.7 (L) 2024    ALBUMIN 1.8 (L) 2024     Lab Results   Component Value Date    PREALBUMIN 17 (L) 2014    PREALBUMIN 16 (L) 2014    PREALBUMIN 19 (L) 2014       Estimated Creatinine Clearance: 48.1 mL/min (A) (based on SCr of 1.7 mg/dL " (H)).    Accu-Checks  Recent Labs     03/26/24  1213 03/26/24  1610 03/26/24  2218 03/27/24  0736 03/27/24  0758 03/27/24  1242 03/27/24  1600 03/27/24  1836 03/27/24  2148 03/28/24  0819   POCTGLUCOSE 200* 159* 128* 130* 132* 174* 251* 250* 248* 262*       Current Medications and/or Treatments Impacting Glycemic Control  Immunotherapy:    Immunosuppressants       None          Steroids:   Hormones (From admission, onward)      Start     Stop Route Frequency Ordered    03/05/24 1536  melatonin tablet 6 mg         -- Oral Nightly PRN 03/05/24 1437          Pressors:    Autonomic Drugs (From admission, onward)      None          Hyperglycemia/Diabetes Medications:   Antihyperglycemics (From admission, onward)      Start     Stop Route Frequency Ordered    03/27/24 1015  insulin aspart U-100 pen 0-5 Units         -- SubQ Every 4 hours PRN 03/27/24 0908    03/24/24 2100  insulin detemir U-100 (Levemir) pen 13 Units         -- SubQ 2 times daily 03/24/24 0929    03/24/24 0715  insulin aspart U-100 pen 4-6 Units         -- SubQ with breakfast 03/23/24 0913    03/20/24 1645  insulin aspart U-100 pen 2-4 Units         -- SubQ with dinner 03/20/24 0644    03/20/24 1130  insulin aspart U-100 pen 3-5 Units         -- SubQ with lunch 03/20/24 0644            ASSESSMENT and PLAN    ID  * Osteomyelitis of left lower extremity  Managed per primary team  Optimize BG control to improve wound healing        Oncology  Iron deficiency anemia      May affect accuracy of A1C     Endocrine  Insulin dependent type 2 diabetes mellitus  Endocrinology consulted for BG management.   BG goal 140-180    - Levemir (Insulin Detemir) 13 units BID   - Novolog (Insulin Aspart) 4-6 units with breakfast, 3-5 lunch and 2-4 units with dinner and prn for BG excursions LDC SSI (150/50) (range added due to fluctuating appetite)  - BG checks AC/HS  - Hypoglycemia protocol in place    ** Please notify Endocrine for any change and/or advance in diet**  **  Please call Endocrine for any BG related issues **    Discharge Planning:   TBD. Please notify endocrinology prior to discharge.  Plan Pending:  - Lantus 28 units nightly  - Novolog 5 units TIDWM  - Novolog SSI    150 - 200 + 2 unit    201 - 250 + 4 units    251 - 300 + 6 units    301 - 350 + 8 units       > 350   + 10 units  - Send logs in 3 days    Education:  Discharge Teaching:    Reviewed topics related to DM including: the need for insulin, how insulin works, what makes it a high risk medication, the importance of immediate follow up with either PCP or endocrine provider, importance of and how to check BG, how to record BG on logs, how to administer insulin, appropriate insulin administration sites, importance of rotating injection sites, hyper/hypoglycemia, how and when to treat hypoglycemia, when to hold insulin, how the correction scale works, importance of storing unused insulin in the refrigerator, and when to seek medical attention.  Patient verbalized understanding, answered all questions to patient's satisfaction. Blood sugar logs given to patient.     Hypoglycemia (Low Blood Sugar)  Too little glucose (sugar) in your blood is called hypoglycemia or low blood sugar. Diabetes itself doesnt cause low blood sugar. But some of the treatments for diabetes, such as pills or insulin, may increase your risk for it. Low blood sugar may cause you to lose consciousness or have a seizure. So always treat low blood sugar right away.    Special note: Always carry a source of fast-acting sugar and a snack in case of hypoglycemia     What You May Notice  If you have low blood sugar, you may have these symptoms:  Shakiness or dizziness  Cold, clammy skin or sweating  Feelings of hunger  Headache  Nervousness  A hard, fast heartbeat  Weakness  Confusion or irritability  Blurred vision  What You Should Do  First, check your blood sugar. If it is too low (out of your target range), eat or drink 15 to 20 grams of  fast-acting sugar. This may be 3 to 4 glucose tablets, 4 oz (half a cup) fruit juice or regular (non-diet) soda, 8 oz (one cup ) fat-free milk, or 1 tablespoon of honey. Dont take more than this, or your blood sugar may go too high.  Wait 15 minutes. Then recheck your blood sugar if you can.  If your blood sugar is still too low, repeat the steps above and check your blood sugar again. If your blood sugar still has not returned to your target range, contact your healthcare provider or seek emergency care.  Once your blood sugar returns to target range, eat a snack or meal.  Preventing Low Blood Sugar  Eat your meals and snacks at the same times each day. Dont skip meals!  Ask your healthcare provider if it is safe for you to drink alcohol. Never drink on an empty stomach.  Take your medication at the prescribed times.  Always carry a source of fast-acting sugar and a snack when youre away from home.  Other Things to Do  Carry a medical ID card or wear a medical alert bracelet or necklace. It should say that you have diabetes. It should also say what to do if you pass out or have a seizure.  Make sure your family, friends, and coworkers know the signs of low blood sugar. Tell them what to do if your blood sugar falls very low and you cant treat yourself.  Keep a glucagon emergency kit handy. Be sure your family, friends, and coworkers know how and when to use it. Check it regularly and replace the glucagon before it expires.  Talk to your healthcare team about other things you can do to prevent low blood sugar.    If you experience hypoglycemia several times, call your doctor.   © 2073-1712 [facility], [address] [place] [address], [place] [address]. All rights reserved. This information is not intended as a substitute for professional medical care. Always follow your healthcare professional's instructions.                 Missy Yadav PA-C  Endocrinology  [facility] - Tele[facility] Step[facility]

## 2024-03-28 NOTE — ASSESSMENT & PLAN NOTE
Patient's anemia is currently uncontrolled. Has received 2 units of PRBCs on 3/23 and 3/16 . Etiology likely d/t chronic blood loss and Iron deficiency. Receiving 1u on 3/26 preoperatively    Current CBC reviewed-   Lab Results   Component Value Date    HGB 7.9 (L) 03/28/2024    HCT 25.8 (L) 03/28/2024     Monitor serial CBC and transfuse if patient becomes hemodynamically unstable, symptomatic or H/H drops below 7/21.

## 2024-03-28 NOTE — SUBJECTIVE & OBJECTIVE
Medications:  Continuous Infusions:  Scheduled Meds:   atorvastatin  40 mg Oral Daily    collagenase   Topical (Top) Daily    ertapenem (INVanz) IV (PEDS and ADULTS)  1 g Intravenous Q24H    gabapentin  300 mg Oral BID    insulin aspart U-100  2-4 Units Subcutaneous with dinner    insulin aspart U-100  3-5 Units Subcutaneous with lunch    insulin aspart U-100  4-6 Units Subcutaneous with breakfast    insulin detemir U-100  13 Units Subcutaneous BID    NIFEdipine  90 mg Oral Daily    pantoprazole  40 mg Oral Daily    sodium chloride 0.9%  10 mL Intravenous Q6H    tamsulosin  0.4 mg Oral Daily    voriconazole  200 mg Oral BID     PRN Meds:acetaminophen, aluminum-magnesium hydroxide-simethicone, dextrose 10%, dextrose 10%, glucagon (human recombinant), glucose, glucose, insulin aspart U-100, melatonin, ondansetron, oxyCODONE, sodium chloride 0.9%, Flushing PICC/Midline Protocol **AND** sodium chloride 0.9% **AND** sodium chloride 0.9%, triamcinolone acetonide 0.025%     Objective:     Vital Signs (Most Recent):  Temp: 98.3 °F (36.8 °C) (03/28/24 0721)  Pulse: 85 (03/28/24 0721)  Resp: 18 (03/28/24 0721)  BP: 129/60 (03/28/24 0721)  SpO2: 95 % (03/28/24 0721) Vital Signs (24h Range):  Temp:  [97 °F (36.1 °C)-98.5 °F (36.9 °C)] 98.3 °F (36.8 °C)  Pulse:  [79-89] 85  Resp:  [15-20] 18  SpO2:  [89 %-100 %] 95 %  BP: (107-143)/(55-67) 129/60        Physical Exam  Constitutional:       Appearance: He is obese. He is ill-appearing.   HENT:      Head: Normocephalic.   Cardiovascular:      Rate and Rhythm: Normal rate and regular rhythm.   Musculoskeletal:         General: Swelling and deformity present.      Right lower leg: Edema present.      Left lower leg: Edema present.      Comments: s/p L AKA. Dressing c/d/i to L stump. EDMUNDO drain intact.   Skin:     General: Skin is dry.      Findings: Bruising, lesion and rash present.   Neurological:      Mental Status: Mental status is at baseline. He is disoriented.   Psychiatric:          Mood and Affect: Mood normal.      Significant Labs:  BMP:   Recent Labs   Lab 03/28/24  0040   *      K 4.5      CO2 20*   BUN 31*   CREATININE 1.7*   CALCIUM 8.0*     CBC:   Recent Labs   Lab 03/28/24  0040   WBC 11.48   RBC 3.05*   HGB 7.5*   HCT 24.6*      MCV 81*   MCH 24.6*   MCHC 30.5*     Significant Diagnostics:  I have reviewed and interpreted all pertinent imaging results/findings within the past 24 hours.

## 2024-03-28 NOTE — ASSESSMENT & PLAN NOTE
Patient's FSGs are uncontrolled due to hyperglycemia on current medication regimen.  Last A1c reviewed-   Lab Results   Component Value Date    HGBA1C >14.0 (H) 03/05/2024     Most recent fingerstick glucose reviewed-   Recent Labs   Lab 03/27/24  1600 03/27/24  1836 03/27/24  2148 03/28/24  0819   POCTGLUCOSE 251* 250* 248* 262*       Current correctional scale   LD SSI  Maintain anti-hyperglycemic dose as follows-   Antihyperglycemics (From admission, onward)    Start     Stop Route Frequency Ordered    03/27/24 1015  insulin aspart U-100 pen 0-5 Units         -- SubQ Every 4 hours PRN 03/27/24 0908    03/24/24 2100  insulin detemir U-100 (Levemir) pen 13 Units         -- SubQ 2 times daily 03/24/24 0929    03/24/24 0715  insulin aspart U-100 pen 4-6 Units         -- SubQ with breakfast 03/23/24 0913    03/20/24 1645  insulin aspart U-100 pen 2-4 Units         -- SubQ with dinner 03/20/24 0644    03/20/24 1130  insulin aspart U-100 pen 3-5 Units         -- SubQ with lunch 03/20/24 0644        Hold Oral hypoglycemics while patient is in the hospital.  Will keep patient on DKA pathway with insulin drip and fluid with protocol in place for switching to subcutaneous insulin as anion gap closes.      DKA    - Resolved  - Continue insulin regimen and BG checks TIDWM and QHS and 2am  - patient tends to become hypoglycemic around dinnertime  - could be insulin stacking from breakfast and lunch insulin aspart given poor PO intake despite assistance with feeding   - detemir 14 unit BID  - aspart scheduled w/ only breakfast and lunch and dinner with different units  -Inpatient consult to Endocrine

## 2024-03-28 NOTE — ASSESSMENT & PLAN NOTE
75 y/o male with A-fib, uncontrolled DMII, HLD, HTN, chronic heel ulcerations c/b chronic osteomyelitis, chronic leg wounds admitted for DKA. ID consulted for infected chronic wounds/ulcerations and abx guidance as with hx of MDROs.     Podiatry obtained left heel wound cultures (probes to bone). Cx + Proteus mirabilis, E. Cloaecae, Fusarium and C. Albicans. Patient has been on IV Ertapenem and Voriconazole. Despite antimicrobials developed fevers. He is now s/p Left AKA on 3/27. ID re-consulted for updated recommendations.     Recommendations  Source control achieved with proximal amputation. Can discontinue antimicrobials 24-48 hours after surgery as long term treatment is not indicated.  Discussed with ID staff. ID will sign off.

## 2024-03-28 NOTE — ASSESSMENT & PLAN NOTE
Chronic, uncontrolled. Latest blood pressure and vitals reviewed-     Temp:  [97.9 °F (36.6 °C)-98.5 °F (36.9 °C)]   Pulse:  [79-86]   Resp:  [17-20]   BP: (107-129)/(59-67)   SpO2:  [90 %-95 %] .   Home meds for hypertension were reviewed and noted below.   Hypertension Medications               furosemide (LASIX) 40 MG tablet Take 20 mg by mouth.    hydrALAZINE (APRESOLINE) 100 MG tablet Take 1 tablet (100 mg total) by mouth every 8 (eight) hours.    isosorbide dinitrate (ISORDIL) 10 MG tablet Take 1 tablet (10 mg total) by mouth 3 (three) times daily.    NIFEdipine (PROCARDIA-XL) 60 MG (OSM) 24 hr tablet Take 1 tablet (60 mg total) by mouth once daily.            While in the hospital, will manage blood pressure as follows; Adjust home antihypertensive regimen as follows- will keep nifedipine - increased to 90     Will utilize p.r.n. blood pressure medication only if patient's blood pressure greater than 180/110 and he develops symptoms such as worsening chest pain or shortness of breath.

## 2024-03-28 NOTE — PROGRESS NOTES
[facility] - Tele[facility] Step[facility]  Vascular Surgery  Progress Note    Patient Name: Saji Castañeda  MRN: [id number]  Admission Date: 3/5/2024  Primary Care Provider: [facility] -    Subjective:     Interval History: VSS, Afebrile, WBC WNL, . Pt seen bedside. Somnolent. Unable to answer questions due to AMS.    Post-Op Info:  Procedure(s) (LRB):  AMPUTATION, ABOVE KNEE (Left)   1 Day Post-Op     Medications:  Continuous Infusions:  Scheduled Meds:   atorvastatin  40 mg Oral Daily    collagenase   Topical (Top) Daily    ertapenem (INVanz) IV (PEDS and ADULTS)  1 g Intravenous Q24H    gabapentin  300 mg Oral BID    insulin aspart U-100  2-4 Units Subcutaneous with dinner    insulin aspart U-100  3-5 Units Subcutaneous with lunch    insulin aspart U-100  4-6 Units Subcutaneous with breakfast    insulin detemir U-100  13 Units Subcutaneous BID    NIFEdipine  90 mg Oral Daily    pantoprazole  40 mg Oral Daily    sodium chloride 0.9%  10 mL Intravenous Q6H    tamsulosin  0.4 mg Oral Daily    voriconazole  200 mg Oral BID     PRN Meds:acetaminophen, aluminum-magnesium hydroxide-simethicone, dextrose 10%, dextrose 10%, glucagon (human recombinant), glucose, glucose, insulin aspart U-100, melatonin, ondansetron, oxyCODONE, sodium chloride 0.9%, Flushing PICC/Midline Protocol **AND** sodium chloride 0.9% **AND** sodium chloride 0.9%, triamcinolone acetonide 0.025%     Objective:     Vital Signs (Most Recent):  Temp: 98.3 °F (36.8 °C) (03/28/24 0721)  Pulse: 85 (03/28/24 0721)  Resp: 18 (03/28/24 0721)  BP: 129/60 (03/28/24 0721)  SpO2: 95 % (03/28/24 0721) Vital Signs (24h Range):  Temp:  [97 °F (36.1 °C)-98.5 °F (36.9 °C)] 98.3 °F (36.8 °C)  Pulse:  [79-89] 85  Resp:  [15-20] 18  SpO2:  [89 %-100 %] 95 %  BP: (107-143)/(55-67) 129/60        Physical Exam  Constitutional:       Appearance: He is obese. He is ill-appearing.   HENT:      Head: Normocephalic.   Cardiovascular:      Rate and Rhythm: Normal rate and  regular rhythm.   Musculoskeletal:         General: Swelling and deformity present.      Right lower leg: Edema present.      Left lower leg: Edema present.      Comments: s/p L AKA. Dressing c/d/i to L stump. EDMUNDO drain intact.   Skin:     General: Skin is dry.      Findings: Bruising, lesion and rash present.   Neurological:      Mental Status: Mental status is at baseline. He is disoriented.   Psychiatric:         Mood and Affect: Mood normal.      Significant Labs:  BMP:   Recent Labs   Lab 03/28/24  0040   *      K 4.5      CO2 20*   BUN 31*   CREATININE 1.7*   CALCIUM 8.0*     CBC:   Recent Labs   Lab 03/28/24  0040   WBC 11.48   RBC 3.05*   HGB 7.5*   HCT 24.6*      MCV 81*   MCH 24.6*   MCHC 30.5*     Significant Diagnostics:  I have reviewed and interpreted all pertinent imaging results/findings within the past 24 hours.  Assessment/Plan:     * Osteomyelitis of left lower extremity  Vascular Surgery re-consulted for to discuss AKA due to LLE calcaneus OM. Patient febrile today and WBC 11.67. Xray L foot shows acute OM to calcaneus. Patient was previously not amendable to AKA but has now agreed to continue with amputation. S/p L AKA 3/27    - S/p L AKA w/ EDMUNDO drain intact. Dressing c/d/i   - Dressing change tomorrow 3/29  - Stump protector ordered and will be delivered bedside  - Do not restart any anticoagulants until POD./ No Plavix either.   -Routine drain care   - Recommend BG <180 to lessen risk of infection of amputation site leading to more proximal amputation or death  - Recommend PT/OT  - Okay to d/c bedrest  - Vascular surgery will continue to follow    Tyra Aguero DPM  Vascular Surgery  [facility] - Telemetry Stepdown

## 2024-03-28 NOTE — PLAN OF CARE
Problem: Occupational Therapy  Goal: Occupational Therapy Goal  Description: Goals to be met by:04-11-24    Patient will increase functional independence with ADLs by performing:  Groom: set up A  Feeding: Set Up A  Rolling bilaterally Mod A with rail  Supine to sit: max A  Sit EOB with Max A    Outcome: Ongoing, Progressing

## 2024-03-28 NOTE — SUBJECTIVE & OBJECTIVE
Interval History: NAEON. Endorses pain on movement of amputated leg, controlled w/ tylenol + dilaudid      Objective:     Vital Signs (Most Recent):  Temp: 98.3 °F (36.8 °C) (03/28/24 1112)  Pulse: 80 (03/28/24 1112)  Resp: 17 (03/28/24 1112)  BP: 128/60 (03/28/24 1112)  SpO2: (!) 93 % (03/28/24 1112) Vital Signs (24h Range):  Temp:  [97.9 °F (36.6 °C)-98.5 °F (36.9 °C)] 98.3 °F (36.8 °C)  Pulse:  [79-86] 80  Resp:  [17-20] 17  SpO2:  [90 %-95 %] 93 %  BP: (107-129)/(59-67) 128/60     Weight: 120.2 kg (264 lb 15.9 oz)  Body mass index is 39.13 kg/m².    Intake/Output Summary (Last 24 hours) at 3/28/2024 1434  Last data filed at 3/28/2024 0621  Gross per 24 hour   Intake --   Output 720 ml   Net -720 ml         Physical Exam  Constitutional:       General: He is not in acute distress.     Appearance: Normal appearance. He is well-developed. Ill appearance: chronically ill appearing.   HENT:      Head: Normocephalic and atraumatic.      Right Ear: External ear normal.      Left Ear: External ear normal.      Nose: Nose normal.   Eyes:      General:         Right eye: No discharge.         Left eye: No discharge.      Conjunctiva/sclera: Conjunctivae normal.   Cardiovascular:      Rate and Rhythm: Normal rate and regular rhythm.      Pulses: Normal pulses.   Pulmonary:      Effort: Pulmonary effort is normal. No respiratory distress.      Breath sounds: No stridor.   Abdominal:      General: Abdomen is flat.      Palpations: Abdomen is soft.      Tenderness: There is no abdominal tenderness.   Musculoskeletal:         General: Swelling and deformity present.      Cervical back: Normal range of motion.      Comments: S/p L-AKA, bandage CDI  RUE swelling d/t DVT   Skin:     General: Skin is warm and dry.   Neurological:      General: No focal deficit present.      Mental Status: He is alert.             Significant Labs: All pertinent labs within the past 24 hours have been reviewed.    Significant Imaging: I have  reviewed all pertinent imaging results/findings within the past 24 hours.

## 2024-03-28 NOTE — ASSESSMENT & PLAN NOTE
Patient has swelling and pain in the right arm and some swelling in the left arm as well    -US b/l upper extremity for DVT showed clot  - Will resume AC pending vasc sx recs

## 2024-03-28 NOTE — ASSESSMENT & PLAN NOTE
Patient's FSGs are uncontrolled due to hyperglycemia on current medication regimen.  Last A1c reviewed-   Lab Results   Component Value Date    HGBA1C >14.0 (H) 03/05/2024     Most recent fingerstick glucose reviewed-   Recent Labs   Lab 03/27/24  1600 03/27/24  1836 03/27/24  2148 03/28/24  0819   POCTGLUCOSE 251* 250* 248* 262*       Current correctional scale  Low  Increase anti-hyperglycemic dose as follows-   Antihyperglycemics (From admission, onward)    Start     Stop Route Frequency Ordered    03/27/24 1015  insulin aspart U-100 pen 0-5 Units         -- SubQ Every 4 hours PRN 03/27/24 0908    03/24/24 2100  insulin detemir U-100 (Levemir) pen 13 Units         -- SubQ 2 times daily 03/24/24 0929    03/24/24 0715  insulin aspart U-100 pen 4-6 Units         -- SubQ with breakfast 03/23/24 0913    03/20/24 1645  insulin aspart U-100 pen 2-4 Units         -- SubQ with dinner 03/20/24 0644    03/20/24 1130  insulin aspart U-100 pen 3-5 Units         -- SubQ with lunch 03/20/24 0644        Hold Oral hypoglycemics while patient is in the hospital.    - Endocrine's following an making insulin adjustments as needed.

## 2024-03-28 NOTE — ASSESSMENT & PLAN NOTE
- eliquis transitioned to Heparin which was paused temporarily d/t concern of new hematuria which developed 3/25. Discussed with urology, the hematuria resolved with repositioning of the fung. Heparin resumed, but will temporarily be paused as patient needs preoperative blood transfusion and has minimal IV access d/t placement difficulties & limb alert on R-arm d/t DVT      - will resume AC pending vasc surg approval/recs

## 2024-03-28 NOTE — PT/OT/SLP RE-EVAL
Occupational Therapy  Co- Re-evaluation    Name: Saji Castañeda  MRN: [id number]  Admitting Diagnosis:  Osteomyelitis of left lower extremity  Recent Surgery: Procedure(s) (LRB):  AMPUTATION, ABOVE KNEE (Left) 1 Day Post-Op  Co-treatment performed due to patient's multiple deficits requiring two skilled therapists to appropriately and safely assess patient's strength and endurance while facilitating functional tasks in addition to accommodating for patient's activity tolerance.     Recommendations:     Discharge Recommendations: Moderate Intensity Therapy  Discharge Equipment Recommendations: hospital bed, lift device (pressure relief mattress)  Barriers to discharge:  Other (Comment) (requires extensive assist)    Assessment:     Saji Castañeda is a 74 y.o. male with a medical diagnosis of Osteomyelitis of left lower extremity.  He presents with significant pain with all transitional movements on this date. Pt. With edema in all extremities but significant in RUE. Pt. With decreased skin integrity and in too much pain on this date to tolerate sitting EOB. MD notified via [facility] and reported would order a PRN meds for pain prior to therapy. Pt. Requires significant assist. Patient would benefit from continued OT services to maximize level of safety and independence with self-care tasks.   .  Performance deficits affecting function are weakness, impaired endurance, impaired self care skills, impaired functional mobility, pain, impaired skin, decreased lower extremity function, visual deficits, impaired cardiopulmonary response to activity, impaired balance, decreased ROM, decreased upper extremity function, edema, decreased coordination, impaired sensation, impaired fine motor.      Rehab Prognosis:  fair; patient would benefit from acute skilled OT services to address these deficits and reach maximum level of function.       Plan:     Patient to be seen 2 x/week to address the above listed problems via self-care/home  management, therapeutic activities, therapeutic exercises, neuromuscular re-education  Plan of Care Expires: 04/11/24  Plan of Care Reviewed with: patient    Subjective     Chief Complaint: pain screaming when attempting to help pt. To sit EOB  Patient/Family stated goals: Would like to have therapy assist him but needs more pain meds   Communicated with: nurse prior to session.  Pain/Comfort:  Pain Rating 1: 10/10  Location 1: gluteal  Pain Addressed 1: Reposition, Distraction, Nurse notified, Other (see comments) (messaged MD)  Pain Rating Post-Intervention 1: 10/10  Pain Rating 2: 10/10  Location - Side 2: Left  Location - Orientation 2: upper  Location 2: leg  Pain Addressed 2: Reposition, Distraction, Pre-medicate for activity, Nurse notified, Other (see comments) (MD notified through messaging)    Objective:     Communicated with: nurse prior to session.  Patient found supine with: telemetry, pressure relief boots, fung catheter upon OT entry to room.Pt. on step wise bed pressure relief mattress    General Precautions: Standard, fall, contact, diabetic  Orthopedic Precautions: LLE non weight bearing, RLE weight bearing as tolerated (RLE in [place])  Braces:  ([place] shoe)  Respiratory Status: Room air    Occupational Performance:    Bed Mobility:    Patient completed Supine to Sit with total assistance and 2 persons but unable to attain sit EOB x 3 attempts secondary to pt. Screaming in pain    Functional Mobility/Transfers:  Drawsheet x 2 to HOB  Functional Mobility: n/a    Activities of Daily Living:  Feeding:  stand by assistance with use of LUE after meat cut for patient    Cognitive/Visual Perceptual:  Cognitive/Psychosocial Skills:     -       Oriented to: Person and Place   -       Communication: clear/fluent  -       Safety awareness/insight to disability: fair   Physical Exam:  Skin integrity: Wound on buttocks region/thigh  Edema:  Severe in RUE; moderate in LUE  Upper Extremity Range of Motion:     -        Right Upper Extremity: not tested 2/2 DVT  -       Left Upper Extremity: WFL elbow and below ; shoulder height to ~ 90 degrees in supine   Strength:    -       Right Upper Extremity:  poor  -       Left Upper Extremity: decreased but able to grasp utensil to feed himself    [facility] 6 Click:  [facility] Total Score: 11    Treatment & Education:  Pt. Educated on role of OT and PT  RUE elevated in bed and positioning boot in place on RLE    Patient left supine with all lines intact, call button in reach, and nurse notified    GOALS:   Multidisciplinary Problems       Occupational Therapy Goals          Problem: Occupational Therapy    Goal Priority Disciplines Outcome Interventions   Occupational Therapy Goal     OT, PT/OT Ongoing, Progressing    Description: Goals to be met by:04-11-24    Patient will increase functional independence with ADLs by performing:  Groom: set up A  Feeding: Set Up A  Rolling bilaterally Mod A with rail  Supine to sit: max A  Sit EOB with Max A                         History:     Past Medical History:   Diagnosis Date    Arthritis     legs    Bacteremia due to Gram-negative bacteria 3/23/2021    Diabetes mellitus     Diabetes mellitus, type 2     Hyperlipidemia     Hypertension     Osteomyelitis     Palliative care encounter 5/24/2023         Past Surgical History:   Procedure Laterality Date    ABOVE-KNEE AMPUTATION Left 3/27/2024    Procedure: AMPUTATION, ABOVE KNEE;  Surgeon: Adal Arellano MD;  Location: [address];  Service: Vascular;  Laterality: Left;    ANGIOGRAPHY OF LOWER EXTREMITY N/A 2/3/2021    Procedure: Angiogram Extremity Bilateral;  Surgeon: Ernst Chacko MD;  Location: [address];  Service: Peripheral Vascular;  Laterality: N/A;  7.4 mintues fluoroscopy time  816.15 mGy  170.17 Gycm2    AORTOGRAPHY WITH EXTREMITY RUNOFF Bilateral 2/3/2021    Procedure: AORTOGRAM, WITH EXTREMITY RUNOFF;  Surgeon: Ernst Chacko MD;  Location: [facility]  2ND FLR;  Service: Peripheral Vascular;  Laterality: Bilateral;    DEBRIDEMENT OF FOOT Left 2/23/2021    Procedure: DEBRIDEMENT, LEFT HEEL;  Surgeon: Mayra Schroeder DPM;  Location: [facility] OR 1ST FLR;  Service: Podiatry;  Laterality: Left;    FOOT AMPUTATION  October 2010    left high midfoot amputation    IMPLANTATION OF LEADLESS PACEMAKER N/A 10/12/2023    Procedure: [id number];  Surgeon: VANESSA Delatorre MD;  Location: [facility] EP LAB;  Service: Cardiology;  Laterality: N/A;  AVB, MICRA, EH, ANES, [place] [id number]       Time Tracking:     OT Date of Treatment: 03/28/24  OT Start Time: 1258  OT Stop Time: 1321  OT Total Time (min): 23 min    Billable Minutes:Re-eval 23    3/28/2024

## 2024-03-28 NOTE — ASSESSMENT & PLAN NOTE
On ertapenem to cover proteus and enterobacter from left leg wound cx, continue for 24-48h post-op per ID

## 2024-03-28 NOTE — ASSESSMENT & PLAN NOTE
Patient with Persistent (7 days or more) atrial fibrillation which is uncontrolled currently with    . Patient is currently in sinus rhythm.[id number] Score: 4. . Anticoagulation indicated. Anticoagulation done with heparin/eliquis .    -heparin one day post op, then eliquis on POD 2

## 2024-03-28 NOTE — NURSING
Informed Dr. Rosa of the patients refusal of dressing changes during the day even after informing the patient of the breakthrough pain medication.

## 2024-03-29 LAB
ALBUMIN SERPL BCP-MCNC: 1.6 G/DL (ref 3.5–5.2)
ALP SERPL-CCNC: 70 U/L (ref 55–135)
ALT SERPL W/O P-5'-P-CCNC: 8 U/L (ref 10–44)
ANION GAP SERPL CALC-SCNC: 8 MMOL/L (ref 8–16)
APTT PPP: 28.7 SEC (ref 21–32)
APTT PPP: 98 SEC (ref 21–32)
AST SERPL-CCNC: 18 U/L (ref 10–40)
BASOPHILS # BLD AUTO: 0.02 K/UL (ref 0–0.2)
BASOPHILS # BLD AUTO: 0.03 K/UL (ref 0–0.2)
BASOPHILS NFR BLD: 0.2 % (ref 0–1.9)
BASOPHILS NFR BLD: 0.3 % (ref 0–1.9)
BILIRUB SERPL-MCNC: 0.2 MG/DL (ref 0.1–1)
BUN SERPL-MCNC: 35 MG/DL (ref 8–23)
CALCIUM SERPL-MCNC: 7.8 MG/DL (ref 8.7–10.5)
CHLORIDE SERPL-SCNC: 109 MMOL/L (ref 95–110)
CO2 SERPL-SCNC: 25 MMOL/L (ref 23–29)
CREAT SERPL-MCNC: 1.5 MG/DL (ref 0.5–1.4)
DIFFERENTIAL METHOD BLD: ABNORMAL
DIFFERENTIAL METHOD BLD: ABNORMAL
EOSINOPHIL # BLD AUTO: 0.3 K/UL (ref 0–0.5)
EOSINOPHIL # BLD AUTO: 0.4 K/UL (ref 0–0.5)
EOSINOPHIL NFR BLD: 3.2 % (ref 0–8)
EOSINOPHIL NFR BLD: 3.7 % (ref 0–8)
ERYTHROCYTE [DISTWIDTH] IN BLOOD BY AUTOMATED COUNT: 16.2 % (ref 11.5–14.5)
ERYTHROCYTE [DISTWIDTH] IN BLOOD BY AUTOMATED COUNT: 16.2 % (ref 11.5–14.5)
EST. GFR  (NO RACE VARIABLE): 48.2 ML/MIN/1.73 M^2
GLUCOSE SERPL-MCNC: 300 MG/DL (ref 70–110)
HCT VFR BLD AUTO: 23.4 % (ref 40–54)
HCT VFR BLD AUTO: 24.2 % (ref 40–54)
HGB BLD-MCNC: 7 G/DL (ref 14–18)
HGB BLD-MCNC: 7.4 G/DL (ref 14–18)
IMM GRANULOCYTES # BLD AUTO: 0.04 K/UL (ref 0–0.04)
IMM GRANULOCYTES # BLD AUTO: 0.05 K/UL (ref 0–0.04)
IMM GRANULOCYTES NFR BLD AUTO: 0.4 % (ref 0–0.5)
IMM GRANULOCYTES NFR BLD AUTO: 0.5 % (ref 0–0.5)
INR PPP: 1.1 (ref 0.8–1.2)
LYMPHOCYTES # BLD AUTO: 1.1 K/UL (ref 1–4.8)
LYMPHOCYTES # BLD AUTO: 1.2 K/UL (ref 1–4.8)
LYMPHOCYTES NFR BLD: 10.8 % (ref 18–48)
LYMPHOCYTES NFR BLD: 11.8 % (ref 18–48)
MAGNESIUM SERPL-MCNC: 2.2 MG/DL (ref 1.6–2.6)
MCH RBC QN AUTO: 24.4 PG (ref 27–31)
MCH RBC QN AUTO: 24.7 PG (ref 27–31)
MCHC RBC AUTO-ENTMCNC: 29.9 G/DL (ref 32–36)
MCHC RBC AUTO-ENTMCNC: 30.6 G/DL (ref 32–36)
MCV RBC AUTO: 81 FL (ref 82–98)
MCV RBC AUTO: 82 FL (ref 82–98)
MONOCYTES # BLD AUTO: 0.7 K/UL (ref 0.3–1)
MONOCYTES # BLD AUTO: 0.8 K/UL (ref 0.3–1)
MONOCYTES NFR BLD: 7.5 % (ref 4–15)
MONOCYTES NFR BLD: 8.2 % (ref 4–15)
NEUTROPHILS # BLD AUTO: 7.5 K/UL (ref 1.8–7.7)
NEUTROPHILS # BLD AUTO: 7.7 K/UL (ref 1.8–7.7)
NEUTROPHILS NFR BLD: 75.6 % (ref 38–73)
NEUTROPHILS NFR BLD: 77.8 % (ref 38–73)
NRBC BLD-RTO: 0 /100 WBC
NRBC BLD-RTO: 0 /100 WBC
PLATELET # BLD AUTO: 420 K/UL (ref 150–450)
PLATELET # BLD AUTO: 424 K/UL (ref 150–450)
PMV BLD AUTO: 9.1 FL (ref 9.2–12.9)
PMV BLD AUTO: 9.2 FL (ref 9.2–12.9)
POCT GLUCOSE: 107 MG/DL (ref 70–110)
POCT GLUCOSE: 223 MG/DL (ref 70–110)
POCT GLUCOSE: 232 MG/DL (ref 70–110)
POCT GLUCOSE: 303 MG/DL (ref 70–110)
POCT GLUCOSE: 449 MG/DL (ref 70–110)
POTASSIUM SERPL-SCNC: 4.4 MMOL/L (ref 3.5–5.1)
PROT SERPL-MCNC: 5.9 G/DL (ref 6–8.4)
PROTHROMBIN TIME: 11.3 SEC (ref 9–12.5)
RBC # BLD AUTO: 2.87 M/UL (ref 4.6–6.2)
RBC # BLD AUTO: 2.99 M/UL (ref 4.6–6.2)
SODIUM SERPL-SCNC: 142 MMOL/L (ref 136–145)
WBC # BLD AUTO: 9.83 K/UL (ref 3.9–12.7)
WBC # BLD AUTO: 9.87 K/UL (ref 3.9–12.7)

## 2024-03-29 PROCEDURE — 25000003 PHARM REV CODE 250

## 2024-03-29 PROCEDURE — 25000003 PHARM REV CODE 250: Performed by: PHYSICIAN ASSISTANT

## 2024-03-29 PROCEDURE — 63600175 PHARM REV CODE 636 W HCPCS

## 2024-03-29 PROCEDURE — 85730 THROMBOPLASTIN TIME PARTIAL: CPT

## 2024-03-29 PROCEDURE — 99233 SBSQ HOSP IP/OBS HIGH 50: CPT | Mod: ,,, | Performed by: PHYSICIAN ASSISTANT

## 2024-03-29 PROCEDURE — 27000207 HC ISOLATION

## 2024-03-29 PROCEDURE — 99232 SBSQ HOSP IP/OBS MODERATE 35: CPT | Mod: ,,,

## 2024-03-29 PROCEDURE — 80053 COMPREHEN METABOLIC PANEL: CPT

## 2024-03-29 PROCEDURE — 36415 COLL VENOUS BLD VENIPUNCTURE: CPT

## 2024-03-29 PROCEDURE — 85025 COMPLETE CBC W/AUTO DIFF WBC: CPT

## 2024-03-29 PROCEDURE — 36415 COLL VENOUS BLD VENIPUNCTURE: CPT | Mod: XB

## 2024-03-29 PROCEDURE — 25000003 PHARM REV CODE 250: Performed by: STUDENT IN AN ORGANIZED HEALTH CARE EDUCATION/TRAINING PROGRAM

## 2024-03-29 PROCEDURE — 20600001 HC STEP DOWN PRIVATE ROOM

## 2024-03-29 PROCEDURE — 36415 COLL VENOUS BLD VENIPUNCTURE: CPT | Mod: XB | Performed by: STUDENT IN AN ORGANIZED HEALTH CARE EDUCATION/TRAINING PROGRAM

## 2024-03-29 PROCEDURE — 83735 ASSAY OF MAGNESIUM: CPT

## 2024-03-29 PROCEDURE — 25000003 PHARM REV CODE 250: Performed by: NURSE PRACTITIONER

## 2024-03-29 PROCEDURE — 85610 PROTHROMBIN TIME: CPT

## 2024-03-29 PROCEDURE — 85025 COMPLETE CBC W/AUTO DIFF WBC: CPT | Mod: 91

## 2024-03-29 PROCEDURE — 85730 THROMBOPLASTIN TIME PARTIAL: CPT | Mod: 91 | Performed by: STUDENT IN AN ORGANIZED HEALTH CARE EDUCATION/TRAINING PROGRAM

## 2024-03-29 PROCEDURE — A4216 STERILE WATER/SALINE, 10 ML: HCPCS

## 2024-03-29 RX ORDER — INSULIN ASPART 100 [IU]/ML
0-10 INJECTION, SOLUTION INTRAVENOUS; SUBCUTANEOUS
Status: DISCONTINUED | OUTPATIENT
Start: 2024-03-29 | End: 2024-03-29

## 2024-03-29 RX ORDER — INSULIN ASPART 100 [IU]/ML
2 INJECTION, SOLUTION INTRAVENOUS; SUBCUTANEOUS ONCE
Status: COMPLETED | OUTPATIENT
Start: 2024-03-29 | End: 2024-03-29

## 2024-03-29 RX ORDER — IBUPROFEN 200 MG
24 TABLET ORAL
Status: DISCONTINUED | OUTPATIENT
Start: 2024-03-29 | End: 2024-04-05 | Stop reason: HOSPADM

## 2024-03-29 RX ORDER — GLUCAGON 1 MG
1 KIT INJECTION
Status: DISCONTINUED | OUTPATIENT
Start: 2024-03-29 | End: 2024-04-05 | Stop reason: HOSPADM

## 2024-03-29 RX ORDER — INSULIN ASPART 100 [IU]/ML
0-5 INJECTION, SOLUTION INTRAVENOUS; SUBCUTANEOUS
Status: DISCONTINUED | OUTPATIENT
Start: 2024-03-29 | End: 2024-03-29

## 2024-03-29 RX ORDER — CLOPIDOGREL BISULFATE 75 MG/1
75 TABLET ORAL DAILY
Status: DISCONTINUED | OUTPATIENT
Start: 2024-03-29 | End: 2024-04-05 | Stop reason: HOSPADM

## 2024-03-29 RX ORDER — ACETAMINOPHEN 325 MG/1
650 TABLET ORAL ONCE
Status: COMPLETED | OUTPATIENT
Start: 2024-03-29 | End: 2024-03-29

## 2024-03-29 RX ORDER — INSULIN ASPART 100 [IU]/ML
4-8 INJECTION, SOLUTION INTRAVENOUS; SUBCUTANEOUS
Status: DISCONTINUED | OUTPATIENT
Start: 2024-03-29 | End: 2024-03-31

## 2024-03-29 RX ORDER — IBUPROFEN 200 MG
16 TABLET ORAL
Status: DISCONTINUED | OUTPATIENT
Start: 2024-03-29 | End: 2024-04-05 | Stop reason: HOSPADM

## 2024-03-29 RX ORDER — INSULIN ASPART 100 [IU]/ML
0-10 INJECTION, SOLUTION INTRAVENOUS; SUBCUTANEOUS
Status: DISCONTINUED | OUTPATIENT
Start: 2024-03-29 | End: 2024-04-05 | Stop reason: HOSPADM

## 2024-03-29 RX ORDER — INSULIN ASPART 100 [IU]/ML
3-6 INJECTION, SOLUTION INTRAVENOUS; SUBCUTANEOUS
Status: DISCONTINUED | OUTPATIENT
Start: 2024-03-29 | End: 2024-03-29

## 2024-03-29 RX ORDER — HEPARIN SODIUM,PORCINE/D5W 25000/250
0-40 INTRAVENOUS SOLUTION INTRAVENOUS CONTINUOUS
Status: DISCONTINUED | OUTPATIENT
Start: 2024-03-29 | End: 2024-03-30

## 2024-03-29 RX ADMIN — ATORVASTATIN CALCIUM 40 MG: 40 TABLET, FILM COATED ORAL at 09:03

## 2024-03-29 RX ADMIN — ACETAMINOPHEN 650 MG: 325 TABLET ORAL at 10:03

## 2024-03-29 RX ADMIN — VORICONAZOLE 200 MG: 200 TABLET, FILM COATED ORAL at 09:03

## 2024-03-29 RX ADMIN — INSULIN ASPART 2 UNITS: 100 INJECTION, SOLUTION INTRAVENOUS; SUBCUTANEOUS at 12:03

## 2024-03-29 RX ADMIN — NIFEDIPINE 90 MG: 30 TABLET, FILM COATED, EXTENDED RELEASE ORAL at 09:03

## 2024-03-29 RX ADMIN — GABAPENTIN 300 MG: 300 CAPSULE ORAL at 09:03

## 2024-03-29 RX ADMIN — INSULIN ASPART 6 UNITS: 100 INJECTION, SOLUTION INTRAVENOUS; SUBCUTANEOUS at 09:03

## 2024-03-29 RX ADMIN — Medication 10 ML: at 11:03

## 2024-03-29 RX ADMIN — HEPARIN SODIUM AND DEXTROSE 18 UNITS/KG/HR: 10000; 5 INJECTION INTRAVENOUS at 02:03

## 2024-03-29 RX ADMIN — OXYCODONE 5 MG: 5 TABLET ORAL at 02:03

## 2024-03-29 RX ADMIN — Medication 10 ML: at 12:03

## 2024-03-29 RX ADMIN — TRAZODONE HYDROCHLORIDE 25 MG: 50 TABLET ORAL at 09:03

## 2024-03-29 RX ADMIN — TAMSULOSIN HYDROCHLORIDE 0.4 MG: 0.4 CAPSULE ORAL at 09:03

## 2024-03-29 RX ADMIN — INSULIN ASPART 8 UNITS: 100 INJECTION, SOLUTION INTRAVENOUS; SUBCUTANEOUS at 05:03

## 2024-03-29 RX ADMIN — PANTOPRAZOLE SODIUM 40 MG: 40 TABLET, DELAYED RELEASE ORAL at 09:03

## 2024-03-29 RX ADMIN — COLLAGENASE SANTYL: 250 OINTMENT TOPICAL at 10:03

## 2024-03-29 RX ADMIN — HYDROMORPHONE HYDROCHLORIDE 0.5 MG: 1 INJECTION, SOLUTION INTRAMUSCULAR; INTRAVENOUS; SUBCUTANEOUS at 05:03

## 2024-03-29 RX ADMIN — INSULIN DETEMIR 15 UNITS: 100 INJECTION, SOLUTION SUBCUTANEOUS at 09:03

## 2024-03-29 RX ADMIN — Medication 10 ML: at 05:03

## 2024-03-29 RX ADMIN — INSULIN ASPART 5 UNITS: 100 INJECTION, SOLUTION INTRAVENOUS; SUBCUTANEOUS at 12:03

## 2024-03-29 RX ADMIN — CLOPIDOGREL BISULFATE 75 MG: 75 TABLET ORAL at 02:03

## 2024-03-29 RX ADMIN — INSULIN ASPART 6 UNITS: 100 INJECTION, SOLUTION INTRAVENOUS; SUBCUTANEOUS at 02:03

## 2024-03-29 RX ADMIN — INSULIN DETEMIR 13 UNITS: 100 INJECTION, SOLUTION SUBCUTANEOUS at 09:03

## 2024-03-29 RX ADMIN — OXYCODONE 5 MG: 5 TABLET ORAL at 09:03

## 2024-03-29 RX ADMIN — Medication 10 ML: at 06:03

## 2024-03-29 NOTE — ASSESSMENT & PLAN NOTE
Chronic, uncontrolled. Latest blood pressure and vitals reviewed-     Temp:  [97.6 °F (36.4 °C)-99.3 °F (37.4 °C)]   Pulse:  [80-85]   Resp:  [17-20]   BP: (118-152)/(56-67)   SpO2:  [91 %-95 %] .   Home meds for hypertension were reviewed and noted below.   Hypertension Medications               furosemide (LASIX) 40 MG tablet Take 20 mg by mouth.    hydrALAZINE (APRESOLINE) 100 MG tablet Take 1 tablet (100 mg total) by mouth every 8 (eight) hours.    isosorbide dinitrate (ISORDIL) 10 MG tablet Take 1 tablet (10 mg total) by mouth 3 (three) times daily.    NIFEdipine (PROCARDIA-XL) 60 MG (OSM) 24 hr tablet Take 1 tablet (60 mg total) by mouth once daily.            While in the hospital, will manage blood pressure as follows; Adjust home antihypertensive regimen as follows- will keep nifedipine - increased to 90     Will utilize p.r.n. blood pressure medication only if patient's blood pressure greater than 180/110 and he develops symptoms such as worsening chest pain or shortness of breath.

## 2024-03-29 NOTE — SUBJECTIVE & OBJECTIVE
Past Medical History:   Diagnosis Date    Arthritis     legs    Bacteremia due to Gram-negative bacteria 3/23/2021    Diabetes mellitus     Diabetes mellitus, type 2     Hyperlipidemia     Hypertension     Osteomyelitis     Palliative care encounter 5/24/2023       Past Surgical History:   Procedure Laterality Date    ABOVE-KNEE AMPUTATION Left 3/27/2024    Procedure: AMPUTATION, ABOVE KNEE;  Surgeon: Adal Arellano MD;  Location: [facility] OR [address] [place] [address];  Service: Vascular;  Laterality: Left;    ANGIOGRAPHY OF LOWER EXTREMITY N/A 2/3/2021    Procedure: Angiogram Extremity Bilateral;  Surgeon: Ernst Chacko MD;  Location: [facility] OR [address] [place] [address];  Service: Peripheral Vascular;  Laterality: N/A;  7.4 mintues fluoroscopy time  816.15 mGy  170.17 Gycm2    AORTOGRAPHY WITH EXTREMITY RUNOFF Bilateral 2/3/2021    Procedure: AORTOGRAM, WITH EXTREMITY RUNOFF;  Surgeon: Ernst Chacko MD;  Location: [address];  Service: Peripheral Vascular;  Laterality: Bilateral;    DEBRIDEMENT OF FOOT Left 2/23/2021    Procedure: DEBRIDEMENT, LEFT HEEL;  Surgeon: Mayra Schroeder DPM;  Location: [address];  Service: Podiatry;  Laterality: Left;    FOOT AMPUTATION  October 2010    left high midfoot amputation    IMPLANTATION OF LEADLESS PACEMAKER N/A 10/12/2023    Procedure: [id number];  Surgeon: VANESSA Delatorre MD;  Location: [facility] EP LAB;  Service: Cardiology;  Laterality: N/A;  AVB, MICRA, EH, [place]ES, [place] [id number]       Review of patient's allergies indicates:  No Known Allergies    Medications:  Medications Prior to Admission   Medication Sig    acetaminophen (TYLENOL) 325 MG tablet Take 650 mg by mouth every 6 (six) hours as needed for Temperature greater than.    acidophilus-pectin, citrus 100 million cell-10 mg Cap Take 1 capsule by mouth 2 (two) times a day.    ascorbic acid, vitamin C, (VITAMIN C) 500 MG tablet Take 500 mg by mouth 2 (two) times daily.    B COMPLEX-VITAMIN B12 tablet Take  "1 tablet by mouth once daily.    BD AUTOSHIELD DUO PEN NEEDLE 30 gauge x 3/16" Ndle     BD ULTRA-FINE ADITYA PEN NEEDLE 32 gauge x 5/32" Ndle USE FOUR TIMES DAILY WITH MEALS AND NIGHTLY    blood sugar diagnostic (CONTOUR TEST STRIPS) Strp Inject 1 strip into the skin 2 (two) times daily before meals.    clopidogreL (PLAVIX) 75 mg tablet Take 1 tablet (75 mg total) by mouth once daily.    gabapentin (NEURONTIN) 300 MG capsule Take 300 mg by mouth 2 (two) times daily.    insulin aspart U-100 (NOVOLOG) 100 unit/mL (3 mL) InPn pen Inject 6 Units into the skin 3 (three) times daily with meals.    MICROLET LANCET Misc     multivitamin (THERAGRAN) per tablet Take 1 tablet by mouth once daily.    NIFEdipine (PROCARDIA-XL) 60 MG (OSM) 24 hr tablet Take 1 tablet (60 mg total) by mouth once daily.    pantoprazole (PROTONIX) 40 MG tablet Take 40 mg by mouth once daily. Before breakfast    pen needle, diabetic 32 gauge x 5/32" Ndle use as directed with insulin    rosuvastatin (CRESTOR) 40 MG Tab Take 40 mg by mouth once daily.    tamsulosin (FLOMAX) 0.4 mg Cap Take 0.4 mg by mouth once daily.    triamcinolone acetonide 0.025% (KENALOG) 0.025 % Oint Apply topically 2 (two) times daily as needed (to affected area).    [DISCONTINUED] apixaban (ELIQUIS) 5 mg Tab Take 1 tablet (5 mg total) by mouth 2 (two) times daily.    [DISCONTINUED] diphenhydrAMINE (BENADRYL) 25 mg capsule No directions listed on the patients medication list.    [DISCONTINUED] furosemide (LASIX) 40 MG tablet Take 20 mg by mouth.    [DISCONTINUED] glipiZIDE (GLUCOTROL) 10 MG tablet Take 20 mg by mouth 2 (two) times a day.    [DISCONTINUED] hydrALAZINE (APRESOLINE) 100 MG tablet Take 1 tablet (100 mg total) by mouth every 8 (eight) hours.    [DISCONTINUED] isosorbide dinitrate (ISORDIL) 10 MG tablet Take 1 tablet (10 mg total) by mouth 3 (three) times daily.    [DISCONTINUED] LANTUS SOLOSTAR U-100 INSULIN glargine 100 units/mL SubQ pen Inject 45 Units into the skin " every evening. (Patient taking differently: Inject 45 Units into the skin once daily.)    [DISCONTINUED] miconazole NITRATE 2 % (MICOTIN) 2 % top powder Apply topically 2 (two) times daily.    [DISCONTINUED] oxyCODONE 5 mg TbOr Take 1 tablet by mouth every 6 (six) hours as needed (Pain (Max 5 daily)).    [DISCONTINUED] sodium hypochlorite 0.5 % (DAKIN'S SOLUTION) external solution Apply topically once daily.     Antibiotics (From admission, onward)      Start     Stop Route Frequency Ordered    03/08/24 1230  ertapenem (INVANZ) 1 g in sodium chloride 0.9 % 100 mL IVPB (MB+)         -- IV Every 24 hours (non-standard times) 03/08/24 1115          Antifungals (From admission, onward)      Start     Stop Route Frequency Ordered    03/15/24 1045  voriconazole tablet 200 mg         -- Oral 2 times daily 03/15/24 0936          Antivirals (From admission, onward)      None             Immunization History   Administered Date(s) Administered    COVID-19, MRNA, LN-S, PF (MODERNA FULL 0.5 ML DOSE) 05/24/2021, 07/13/2021    Influenza - Trivalent (ADULT) 10/08/2020    PPD Test 12/09/2020, 03/02/2021, 06/21/2022, 03/18/2024    Pneumococcal Conjugate - 13 Valent 10/16/2018       Family History       Problem Relation (Age of Onset)    Cancer Brother    Diabetes Mother, Sister    Heart disease Mother    Stroke Sister          Social History     Socioeconomic History    Marital status: Single   Tobacco Use    Smoking status: Never    Smokeless tobacco: Never   Substance and Sexual Activity    Alcohol use: No     Comment: occassional    Drug use: No    Sexual activity: Not Currently   Social History Narrative    Not currently working; lives with family     Social Determinants of Health     Financial Resource Strain: Medium Risk (3/5/2024)    Overall Financial Resource Strain (CARDIA)     Difficulty of Paying Living Expenses: Somewhat hard   Food Insecurity: No Food Insecurity (3/5/2024)    Hunger Vital Sign     Worried About Running  Out of Food in the Last Year: Never true     Ran Out of Food in the Last Year: Never true   Transportation Needs: No Transportation Needs (3/5/2024)    PRAPARE - Transportation     Lack of Transportation (Medical): No     Lack of Transportation (Non-Medical): No   Physical Activity: Inactive (3/5/2024)    Exercise Vital Sign     Days of Exercise per Week: 0 days     Minutes of Exercise per Session: 0 min   Stress: No Stress Concern Present (3/5/2024)    [employer] [place] [employer] - Occupational Stress Questionnaire     Feeling of Stress : Only a little   Social Connections: Socially Isolated (3/5/2024)    Social Connection and Isolation Panel [NHANES]     Frequency of Communication with Friends and Family: More than three times a week     Frequency of Social Gatherings with Friends and Family: Patient declined     Attends Jehovah's witness Services: Never     Active Member of Clubs or Organizations: No     Attends Club or Organization Meetings: Never     Marital Status: Never    Housing Stability: Low Risk  (3/5/2024)    Housing Stability Vital Sign     Unable to Pay for Housing in the Last Year: No     Number of Places Lived in the Last Year: 1     Unstable Housing in the Last Year: No     Review of Systems   Constitutional:  Negative for chills, diaphoresis and fever.   Respiratory:  Negative for shortness of breath.    Cardiovascular:  Negative for chest pain.   Gastrointestinal:  Negative for abdominal pain, diarrhea, nausea and vomiting.   Genitourinary:  Negative for dysuria and hematuria.   Musculoskeletal:  Positive for myalgias.   Skin:  Positive for color change and wound.   Psychiatric/Behavioral:  Negative for agitation.      Objective:     Vital Signs (Most Recent):  Temp: 98.6 °F (37 °C) (03/29/24 0743)  Pulse: 80 (03/29/24 1128)  Resp: 18 (03/29/24 0923)  BP: (!) 125/59 (03/29/24 0908)  SpO2: (!) 92 % (03/29/24 0908) Vital Signs (24h Range):  Temp:  [97.6 °F (36.4 °C)-99.3 °F (37.4 °C)]  98.6 °F (37 °C)  Pulse:  [80-86] 80  Resp:  [17-18] 18  SpO2:  [90 %-95 %] 92 %  BP: (117-152)/(56-67) 125/59     Weight: 120.2 kg (264 lb 15.9 oz)  Body mass index is 39.13 kg/m².    Estimated Creatinine Clearance: 54.5 mL/min (A) (based on SCr of 1.5 mg/dL (H)).     Physical Exam  Constitutional:       General: He is not in acute distress.     Appearance: Normal appearance. He is well-developed. He is obese. He is not diaphoretic.   HENT:      Head: Normocephalic and atraumatic.      Right Ear: External ear normal.      Left Ear: External ear normal.      Nose: Nose normal.   Eyes:      General: No scleral icterus.        Right eye: No discharge.         Left eye: No discharge.      Extraocular Movements: Extraocular movements intact.      Conjunctiva/sclera: Conjunctivae normal.   Pulmonary:      Effort: Pulmonary effort is normal. No respiratory distress.      Breath sounds: No stridor.   Musculoskeletal:      Comments: AKA site dressed   Skin:     General: Skin is dry.      Coloration: Skin is not jaundiced or pale.      Findings: No erythema.   Neurological:      General: No focal deficit present.      Mental Status: He is alert and oriented to person, place, and time. Mental status is at baseline.   Psychiatric:         Mood and Affect: Mood normal.         Behavior: Behavior normal.         Thought Content: Thought content normal.         Judgment: Judgment normal.          Significant Labs: Blood Culture:   Recent Labs   Lab 10/02/23  1701 10/06/23  1131 10/06/23  1133 03/05/24  0800 03/19/24  1214   LABBLOO Gram stain aer bottle: Gram negative rods  Results called to and read back by:Alejandra Figueroa RN 10/03/2023  06:29  Gram stain jumana bottle:Gram positive cocci in clusters resembling Staph  Results called to and read back by:Mauro Prado Rn 10/07/2023  00:39  Reviewed by PhD Supervisor, Supervisor, or designee 10/04/2023  13:53  ACINETOBACTER BAUMANNII   further identified as Acinetobacter  baumannii nosocomialis group  For susceptibility see order #[id number]  *  COAGULASE-NEGATIVE STAPHYLOCOCCUS SPECIES  Organism is a probable contaminant  *  Gram stain aer bottle: Gram negative rods  Gram stain jumana bottle: Gram negative rods  Results called to and read back by:Alejandra Figueroa RN 10/03/2023  06:29  Gram stain jumana bottle: Gram positive cocci in clusters resembling Staph  Results called to and read back by: Ricky Munguia RN 10/05/2023  06:53  KLEBSIELLA PNEUMONIAE ESBL*  ACINETOBACTER BAUMANNII   further identified as Acinetobacter baumannii nosocomialis group  * No growth after 5 days. No growth after 5 days. No growth after 5 days.  No growth after 5 days. No growth after 5 days.  No growth after 5 days.       CBC:   Recent Labs   Lab 03/28/24  0040 03/28/24  0940 03/29/24  0804   WBC 11.48 11.00 9.83   HGB 7.5* 7.9* 7.4*   HCT 24.6* 25.8* 24.2*    452* 424       CMP:   Recent Labs   Lab 03/28/24  0040 03/28/24  0940 03/29/24  0804    143 142   K 4.5 4.2 4.4    111* 109   CO2 20* 22* 25   * 228* 300*   BUN 31* 34* 35*   CREATININE 1.7* 1.6* 1.5*   CALCIUM 8.0* 8.6* 7.8*   PROT 6.5 6.9 5.9*   ALBUMIN 1.6* 1.7* 1.6*   BILITOT 0.2 0.2 0.2   ALKPHOS 70 73 70   AST 23 27 18   ALT 8* 8* 8*   ANIONGAP 12 10 8       Microbiology Results (last 7 days)       Procedure Component Value Units Date/Time    Blood culture [[id number]] Collected: 03/19/24 1214    Order Status: Completed Specimen: Blood Updated: 03/24/24 1412     Blood Culture, Routine No growth after 5 days.    Blood culture [[id number]] Collected: 03/19/24 1214    Order Status: Completed Specimen: Blood Updated: 03/24/24 1412     Blood Culture, Routine No growth after 5 days.          Recent Lab Results  (Last 5 results in the past 24 hours)        03/29/24  0906   03/29/24  0804   03/28/24 2116   03/28/24  1635   03/28/24  1214        Albumin   1.6             ALP   70             ALT   8             Anion  Gap   8             AST   18             Baso #   0.02             Basophil %   0.2             BILIRUBIN TOTAL   0.2  Comment: For infants and newborns, interpretation of results should be based  on gestational age, weight and in agreement with clinical  observations.    Premature Infant recommended reference ranges:  Up to 24 hours.............<8.0 mg/dL  Up to 48 hours............<12.0 mg/dL  3-5 days..................<15.0 mg/dL  6-29 days.................<15.0 mg/dL               BUN   35             Calcium   7.8             Chloride   109             CO2   25             Creatinine   1.5             Differential Method   Automated             eGFR   48.2             Eos #   0.3             Eos %   3.2             Glucose   300             Gran # (ANC)   7.7             Gran %   77.8             Hematocrit   24.2             Hemoglobin   7.4             Immature Grans (Abs)   0.05  Comment: Mild elevation in immature granulocytes is non specific and   can be seen in a variety of conditions including stress response,   acute inflammation, trauma and pregnancy. Correlation with other   laboratory and clinical findings is essential.               Immature Granulocytes   0.5             Lymph #   1.1             Lymph %   10.8             Magnesium    2.2             MCH   24.7             MCHC   30.6             MCV   81             Mono #   0.7             Mono %   7.5             MPV   9.2             nRBC   0             Platelet Count   424             POCT Glucose 303     212   196   209       Potassium   4.4             PROTEIN TOTAL   5.9             RBC   2.99             RDW   16.2             Sodium   142             WBC   9.83                                    Significant Imaging:     Imaging Results              X-Ray Chest AP Portable (Final result)  Result time 03/05/24 09:57:35      Final result by Maddy Weeks MD (03/05/24 09:57:35)                   Impression:      Mild increased  pulmonary vascularity centrally, not significantly changed from the prior study.      Electronically signed by: Maddy Weeks MD  Date:    03/05/2024  Time:    09:57               Narrative:    EXAMINATION:  XR CHEST AP PORTABLE    CLINICAL HISTORY:  Sepsis;    TECHNIQUE:  Single frontal view of the chest was performed.    COMPARISON:  10/12/2023    FINDINGS:  The cardiac silhouette is midline.  Leadless pacer.    The pulmonary vascularity is increased centrally.    No focal airspace disease.    No pleural effusion.  No pneumothorax.    The osseous structures appear normal.

## 2024-03-29 NOTE — ASSESSMENT & PLAN NOTE
75 y/o male with A-fib, uncontrolled DMII, HLD, HTN, chronic heel ulcerations c/b chronic osteomyelitis, chronic leg wounds admitted 3/5 for DKA. ID consulted for infected chronic wounds/ulcerations and abx guidance as with hx of MDROs.     Podiatry obtained left heel wound cultures (probes to bone). Cx + Proteus mirabilis, E. Cloaecae, Fusarium and C. Albicans. Patient has been on IV Ertapenem and Voriconazole. Despite antimicrobials developed fevers. He is now s/p Left AKA on 3/27. Afebrile post op. No leukocytosis. HDS.    Recommendations  Source control achieved with proximal amputation. Can discontinue antimicrobials 24 hours as long term treatment is not indicated.  ID will sign off. Feel free to re-consult ID as needed.

## 2024-03-29 NOTE — ASSESSMENT & PLAN NOTE
Insulin mgmt per endocrine    Patient's FSGs are uncontrolled due to hyperglycemia on current medication regimen.  Last A1c reviewed-   Lab Results   Component Value Date    HGBA1C >14.0 (H) 03/05/2024     Most recent fingerstick glucose reviewed-   Recent Labs   Lab 03/28/24  0819 03/28/24  1214 03/28/24  1635 03/28/24  2116   POCTGLUCOSE 262* 209* 196* 212*       Current correctional scale  Low  Increase anti-hyperglycemic dose as follows-   Antihyperglycemics (From admission, onward)      Start     Stop Route Frequency Ordered    03/27/24 1015  insulin aspart U-100 pen 0-5 Units         -- SubQ Every 4 hours PRN 03/27/24 0908    03/24/24 2100  insulin detemir U-100 (Levemir) pen 13 Units         -- SubQ 2 times daily 03/24/24 0929    03/24/24 0715  insulin aspart U-100 pen 4-6 Units         -- SubQ with breakfast 03/23/24 0913    03/20/24 1645  insulin aspart U-100 pen 2-4 Units         -- SubQ with dinner 03/20/24 0644    03/20/24 1130  insulin aspart U-100 pen 3-5 Units         -- SubQ with lunch 03/20/24 0644          Hold Oral hypoglycemics while patient is in the hospital.    - Endocrine's following an making insulin adjustments as needed.

## 2024-03-29 NOTE — PROGRESS NOTES
[facility] - Telemetry Stepdown  Vascular Surgery  Progress Note    Patient Name: Saji Castañeda  MRN: [id number]  Admission Date: 3/5/2024  Primary Care Provider: [facility] -    Subjective:     Interval History: POD 2 L AKA. Surgical dressing taken down this morning, healing appropriately. Keeping drain in place today    Post-Op Info:  Procedure(s) (LRB):  AMPUTATION, ABOVE KNEE (Left)   2 Days Post-Op     Medications:  Continuous Infusions:  Scheduled Meds:   atorvastatin  40 mg Oral Daily    collagenase   Topical (Top) Daily    ertapenem (INVanz) IV (PEDS and ADULTS)  1 g Intravenous Q24H    gabapentin  300 mg Oral BID    insulin aspart U-100  2-4 Units Subcutaneous with dinner    insulin aspart U-100  3-5 Units Subcutaneous with lunch    insulin aspart U-100  4-6 Units Subcutaneous with breakfast    insulin detemir U-100  13 Units Subcutaneous BID    NIFEdipine  90 mg Oral Daily    pantoprazole  40 mg Oral Daily    sodium chloride 0.9%  10 mL Intravenous Q6H    tamsulosin  0.4 mg Oral Daily    traZODone  25 mg Oral QHS    voriconazole  200 mg Oral BID     PRN Meds:acetaminophen, aluminum-magnesium hydroxide-simethicone, dextrose 10%, dextrose 10%, glucagon (human recombinant), glucose, glucose, HYDROmorphone, insulin aspart U-100, melatonin, ondansetron, oxyCODONE, sodium chloride 0.9%, Flushing PICC/Midline Protocol **AND** sodium chloride 0.9% **AND** sodium chloride 0.9%, triamcinolone acetonide 0.025%     Objective:     Vital Signs (Most Recent):  Temp: 97.6 °F (36.4 °C) (03/29/24 0419)  Pulse: 86 (03/29/24 0743)  Resp: 18 (03/29/24 0743)  BP: (!) 117/57 (03/29/24 0743)  SpO2: (!) 90 % (03/29/24 0743) Vital Signs (24h Range):  Temp:  [97.6 °F (36.4 °C)-99.3 °F (37.4 °C)] 97.6 °F (36.4 °C)  Pulse:  [80-86] 86  Resp:  [17-18] 18  SpO2:  [90 %-95 %] 90 %  BP: (117-152)/(56-67) 117/57          Physical Exam  Vitals and nursing note reviewed.   Constitutional:       Appearance: He is obese. He is  ill-appearing.   HENT:      Head: Normocephalic.   Cardiovascular:      Rate and Rhythm: Normal rate and regular rhythm.   Skin:     General: Skin is dry.      Findings: Bruising, lesion and rash present. AKA wound with dressing in place, incision c/d/i  Neurological:      Mental Status: Mental status is at baseline. He is disoriented.          Significant Labs:  BMP:   Recent Labs   Lab 03/28/24  0940 03/29/24  0804   *  --     142   K 4.2 4.4   * 109   CO2 22*  --    BUN 34*  --    CREATININE 1.6*  --    CALCIUM 8.6*  --    MG 2.2  --      CBC:   Recent Labs   Lab 03/29/24  0804   WBC 9.83   RBC 2.99*   HGB 7.4*   HCT 24.2*      MCV 81*   MCH 24.7*   MCHC 30.6*       Significant Diagnostics:  I have reviewed all pertinent imaging results/findings within the past 24 hours.  Assessment/Plan:     * Osteomyelitis of left lower extremity  Vascular Surgery re-consulted for to discuss AKA due to LLE calcaneus OM. Patient febrile today and WBC 11.67. Xray L foot shows acute OM to calcaneus. Patient was previously not amendable to AKA but has now agreed to continue with amputation. S/p L AKA 3/27    - S/p L AKA w/ EDMUNDO drain intact. Incision c/d/i8  - Dressing changed this morning  - Stump protector ordered and will be delivered bedside  - May restart plavix, heparin gtt  - Recommend BG <180 to lessen risk of infection of amputation site leading to more proximal amputation or death  - Recommend PT/OT  - Okay to d/c bedrest  - Vascular surgery will continue to follow               Calista Mcgrath MD  Vascular Surgery  [facility] - Telemetry Stepdown

## 2024-03-29 NOTE — PROGRESS NOTES
[facility]  Progress Note    Patient Name: Saji Castañeda  MRN: [id number]  Patient Class: IP- Inpatient   Admission Date: 3/5/2024  Length of Stay: 24 days  Attending Physician: Jm Rosa MD  Primary Care Provider: [facility] -        Subjective:     Principal Problem:Osteomyelitis of left lower extremity        HPI:  Saji Castañeda is a 74 y.o. male with a medical history of DM2, HLD, HTN, chronic osteomyelitis of L heel, L TMA, PAD, hx of ESBL klebsiella, acinetobacter bacteremia, presenting with hyperglycemia. He presented to the ED via EMS and his POCT glucose on arrival was >500. Serum blood glucose 667 with anion gap of 17 and elevated ketones. Serum potassium 3.4, corrected sodium 149. Urinalysis significant for RBCs, glucosuria, and moderate bacteria and yeast. CBC revealed leukocytosis, elevated platelets, and mild anemia. CMP shows Na 135, K 3.4, BUN 29, Cr 2.1, Glucose 677, Lactate 4.61. BNP and troponin elevated. GFR 32.2. Insulin drip, IV fluids, zosyn, vancomycin, and potassium given.      He is unable to provide much history, but states that he has not been taking his home medications for at least a week. He reports shortness of breath, fatigue, and weakness for the last 6-7 days. He has chills and reports episodes of emesis. He denies abdominal pain, chest pain, palpitations, recent illness/infection, fevers, changes to bowel movements and urinary output. Patient lives with his brother and sister at home. He was last seen in the ED on 2/21 after a fall. He was hypoglycemic BGL 60 at that time which returned to normal after eating. He was also scheduled for a wound clinic appointment today 3/5 at [facility] with Dr. Wilson.        Overview/Hospital Course:  Wound care and cultures collected by Podiatry with recs for Vascular consult for amputation. Patient declined amputation. ID recommending IV Ertapenem for chronic osteomyelitis for 6 week duration  (EED: 4/15/24). With high wound care needs and IV antibiotics plan patient needing long-term placement. He is being treated with topical permethrin for bedbugs. Wound care has been dressing the wounds. 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 Still adjusting insulin to control sugars better. Giving fluids for YNES. Poor PO intake even with feeding assistance. LTAC authorization denied by [employer]. Waiting for SNF placement ([facility], awaiting auth and family to complete paperwork). Patient started spiking fever again and infectious work up done. ID re-consulted. Patient has been counseled multiple times that source control cannot be achieved without amputation. Patient agreeable to AKA scheduled for 3/27. Transitioned to heparin from eliquis. Fung placed for urinary rentention. Discussed new hematuria with urology, hematuria resolved with repositioning of fung. Pt to receive 1u PRBC preoperatively per vascular surgery. S/p L AKA on 3/27 with vascular surgery. ID rec 24-48h post-op abx now that source control is achieved, unless further concerned for infection. Delirium precautions + trazodone added for sleep hygiene, rescheduled lab times appropriately. Resuming heparin drip + plavix 3/29 per vascular surg.      Interval History: NAEON. Pt refusing dressing changes despite analgesia. Reminded pt he has additional pain medicine available should he need it. Dressing changed later this morning after PRN pain medicine given.      Objective:     Vital Signs (Most Recent):  Temp: 97.6 °F (36.4 °C) (03/29/24 0419)  Pulse: 83 (03/29/24 0419)  Resp: 18 (03/29/24 0419)  BP: 123/67 (03/29/24 0419)  SpO2: (!) 92 % (03/29/24 0419) Vital Signs (24h Range):  Temp:  [97.6 °F (36.4 °C)-99.3 °F (37.4 °C)] 97.6 °F (36.4 °C)  Pulse:  [80-85] 83  Resp:  [17-20] 18  SpO2:  [91 %-95 %] 92 %  BP: (118-152)/(56-67) 123/67     Weight: 120.2 kg (264 lb 15.9 oz)  Body mass index is  39.13 kg/m².    Intake/Output Summary (Last 24 hours) at 3/29/2024 0635  Last data filed at 3/28/2024 1835  Gross per 24 hour   Intake 300 ml   Output 355 ml   Net -55 ml         Physical Exam  Constitutional:       General: He is not in acute distress.     Appearance: Normal appearance. He is well-developed. Ill appearance: chronically ill appearing.   HENT:      Head: Normocephalic and atraumatic.      Right Ear: External ear normal.      Left Ear: External ear normal.      Nose: Nose normal.   Eyes:      General:         Right eye: No discharge.         Left eye: No discharge.      Conjunctiva/sclera: Conjunctivae normal.   Cardiovascular:      Rate and Rhythm: Normal rate and regular rhythm.      Pulses: Normal pulses.   Pulmonary:      Effort: Pulmonary effort is normal. No respiratory distress.      Breath sounds: No stridor.   Abdominal:      General: Abdomen is flat.      Palpations: Abdomen is soft.      Tenderness: There is no abdominal tenderness.   Musculoskeletal:         General: Swelling and deformity present.      Cervical back: Normal range of motion.      Comments: S/p L-AKA, bandage CDI  RUE swelling d/t DVT   Skin:     General: Skin is warm and dry.   Neurological:      General: No focal deficit present.      Mental Status: He is alert.             Significant Labs: All pertinent labs within the past 24 hours have been reviewed.    Significant Imaging: I have reviewed all pertinent imaging results/findings within the past 24 hours.    Assessment/Plan:      * Osteomyelitis of left lower extremity  Resolved w/ source control, d/c abx today    Patient with Proteus and enterobacter on leg cultures collected by Podiatry with concern for acute on chronic osteo. Fungal culture grew fusarium. He has started spiking fever and still is not agreeable to amputation, will do infectious work up to look out for source.    -UA noninfectious   -Blood culture NGTD  - Added voriconazole for fusarium coverage which  grew on fungal culture - 3 months  - midline catheters placed on admission for difficult IV access  - Per ID consult note 3/28: Source control achieved with proximal amputation. Can discontinue antimicrobials 24-48 hours after surgery as long term treatment is not indicated.       Urinary retention  Patient developed new urinary retention.     -Fung's placed   -Flomax  -Renal ultrasound showed no hydronephrosis  - Making adequate urine with fung, c/f hematuria which developed 3/25 and resolved with repositioning of fung. See urology note from same date  - voiding trial prior to DC. If unable to void, urology f/u outpatient       BPH (benign prostatic hyperplasia)  Fung in placed, placed by urology  Flomax daily      Localized swelling of right upper extremity  Patient has swelling and pain in the right arm and some swelling in the left arm as well    -US b/l upper extremity for DVT showed clot  - resume AC today    History of Bedbug bite with infection  Patient started on Permethrin daily for 5 days starting 3/12      Proteus infection  D/c abx tomorrow per ID  On ertapenem to cover proteus and enterobacter from left leg wound cx, continue for 24-48h post-op per ID    Chronic anticoagulation  - See DVT      History of amputation of left high mid foot   See osteo      Stage 3b chronic kidney disease  Creatine stable for now. BMP reviewed- noted Estimated Creatinine Clearance: 51.1 mL/min (A) (based on SCr of 1.6 mg/dL (H)). according to latest data. Based on current GFR, CKD stage is stage 3 - GFR 30-59.  Monitor UOP and serial BMP and adjust therapy as needed. Renally dose meds. Avoid nephrotoxic medications and procedures.    Severe sepsis  Source control achieved with proximal amputation. Per ID can discontinue antimicrobials 24-48 hours after surgery as long term treatment is not indicated    This patient does have evidence of infective focus  My overall impression is sepsis.  Source: Skin and Soft Tissue  "(location left leg)  Antibiotics given-   Antibiotics (72h ago, onward)      Start     Stop Route Frequency Ordered    03/08/24 1230  ertapenem (INVANZ) 1 g in sodium chloride 0.9 % 100 mL IVPB (MB+)         -- IV Every 24 hours (non-standard times) 03/08/24 1115          Latest lactate reviewed-  No results for input(s): "LACTATE", "POCLAC" in the last 72 hours.    Organ dysfunction indicated by Acute kidney injury    Fluid challenge Actual Body weight- Patient will receive 30ml/kg actual body weight to calculate fluid bolus for treatment of septic shock.     Post- resuscitation assessment No - Post resuscitation assessment not needed       Will Not start Pressors-     - See osteo      Other hyperlipidemia  Continue home atorvastatin      Diabetic ketoacidosis without coma associated with type 2 diabetes mellitus  Resolved    Patient's FSGs are uncontrolled due to hyperglycemia on current medication regimen.  Last A1c reviewed-   Lab Results   Component Value Date    HGBA1C >14.0 (H) 03/05/2024     Most recent fingerstick glucose reviewed-   Recent Labs   Lab 03/28/24  0819 03/28/24  1214 03/28/24  1635 03/28/24  2116   POCTGLUCOSE 262* 209* 196* 212*       Current correctional scale   LD SSI  Maintain anti-hyperglycemic dose as follows-   Antihyperglycemics (From admission, onward)      Start     Stop Route Frequency Ordered    03/27/24 1015  insulin aspart U-100 pen 0-5 Units         -- SubQ Every 4 hours PRN 03/27/24 0908    03/24/24 2100  insulin detemir U-100 (Levemir) pen 13 Units         -- SubQ 2 times daily 03/24/24 0929    03/24/24 0715  insulin aspart U-100 pen 4-6 Units         -- SubQ with breakfast 03/23/24 0913    03/20/24 1645  insulin aspart U-100 pen 2-4 Units         -- SubQ with dinner 03/20/24 0644    03/20/24 1130  insulin aspart U-100 pen 3-5 Units         -- SubQ with lunch 03/20/24 0644          Hold Oral hypoglycemics while patient is in the hospital.  Will keep patient on DKA pathway with " insulin drip and fluid with protocol in place for switching to subcutaneous insulin as anion gap closes.      DKA    - Resolved  - Continue insulin regimen and BG checks TIDWM and QHS and 2am  - patient tends to become hypoglycemic around dinnertime  - could be insulin stacking from breakfast and lunch insulin aspart given poor PO intake despite assistance with feeding   - detemir 14 unit BID  - aspart scheduled w/ only breakfast and lunch and dinner with different units  -Inpatient consult to Endocrine    Paroxysmal atrial fibrillation  Patient with Persistent (7 days or more) atrial fibrillation which is uncontrolled currently with    . Patient is currently in sinus rhythm.[id number] Score: 4. . Anticoagulation indicated. Anticoagulation done with heparin/eliquis .    - resuming AC today    Chronic deep vein thrombosis (DVT) of right upper extremity  - eliquis transitioned to Heparin which was paused temporarily d/t concern of new hematuria which developed 3/25. Discussed with urology, the hematuria resolved with repositioning of the fung. Heparin resumed, but will temporarily be paused as patient needs preoperative blood transfusion and has minimal IV access d/t placement difficulties & limb alert on R-arm d/t DVT    - Hgb stable, drain site benign, resuming AC per vasc surg      Iron deficiency anemia  Patient's anemia is currently uncontrolled. Has received 2 units of PRBCs on 3/23 and 3/16 . Etiology likely d/t chronic blood loss and Iron deficiency. Receiving 1u on 3/26 preoperatively    Current CBC reviewed-   Lab Results   Component Value Date    HGB 7.9 (L) 03/28/2024    HCT 25.8 (L) 03/28/2024     Monitor serial CBC and transfuse if patient becomes hemodynamically unstable, symptomatic or H/H drops below 7/21.    Cellulitis of left lower leg  Resolved w/ source control, see osteo      Peripheral arterial disease  Resuming plavix today      YNES (acute kidney injury)  Resolving    Patient with acute kidney  injury/acute renal failure likely due to pre-renal azotemia due to dehydration and post-obstructive d/t urinary retention  YNES is currently stable. Baseline creatinine  1.1  - Labs reviewed- Renal function/electrolytes with Estimated Creatinine Clearance: 51.1 mL/min (A) (based on SCr of 1.6 mg/dL (H)). according to latest data. Monitor urine output and serial BMP and adjust therapy as needed. Avoid nephrotoxins and renally dose meds for GFR listed above.    Creatinine 2.1 on admit, baseline around 1.1  - prerenal vs obstructive (fung placed)     Lab Results   Component Value Date    CREATININE 1.6 (H) 03/28/2024       Plan:   - Strict I&Os and daily weights   - Daily BMP  - Trend sCr  - Avoid nephrotoxic agents such as NSAIDs, ACEi, ARBs and IV radiocontrast.  - Renally dose meds to current GFR.  - Maintain MAP > 65.  - No indications for HD at this time.   - Maintain electrolytes at goal: Mg > 2, Phos > 3, K > 4.           Insulin dependent type 2 diabetes mellitus  Insulin mgmt per endocrine    Patient's FSGs are uncontrolled due to hyperglycemia on current medication regimen.  Last A1c reviewed-   Lab Results   Component Value Date    HGBA1C >14.0 (H) 03/05/2024     Most recent fingerstick glucose reviewed-   Recent Labs   Lab 03/28/24  0819 03/28/24  1214 03/28/24  1635 03/28/24  2116   POCTGLUCOSE 262* 209* 196* 212*       Current correctional scale  Low  Increase anti-hyperglycemic dose as follows-   Antihyperglycemics (From admission, onward)      Start     Stop Route Frequency Ordered    03/27/24 1015  insulin aspart U-100 pen 0-5 Units         -- SubQ Every 4 hours PRN 03/27/24 0908    03/24/24 2100  insulin detemir U-100 (Levemir) pen 13 Units         -- SubQ 2 times daily 03/24/24 0929    03/24/24 0715  insulin aspart U-100 pen 4-6 Units         -- SubQ with breakfast 03/23/24 0913    03/20/24 1645  insulin aspart U-100 pen 2-4 Units         -- SubQ with dinner 03/20/24 0644    03/20/24 1130  insulin  aspart U-100 pen 3-5 Units         -- SubQ with lunch 03/20/24 0644          Hold Oral hypoglycemics while patient is in the hospital.    - Endocrine's following an making insulin adjustments as needed.    Essential hypertension  Chronic, uncontrolled. Latest blood pressure and vitals reviewed-     Temp:  [97.6 °F (36.4 °C)-99.3 °F (37.4 °C)]   Pulse:  [80-85]   Resp:  [17-20]   BP: (118-152)/(56-67)   SpO2:  [91 %-95 %] .   Home meds for hypertension were reviewed and noted below.   Hypertension Medications               furosemide (LASIX) 40 MG tablet Take 20 mg by mouth.    hydrALAZINE (APRESOLINE) 100 MG tablet Take 1 tablet (100 mg total) by mouth every 8 (eight) hours.    isosorbide dinitrate (ISORDIL) 10 MG tablet Take 1 tablet (10 mg total) by mouth 3 (three) times daily.    NIFEdipine (PROCARDIA-XL) 60 MG (OSM) 24 hr tablet Take 1 tablet (60 mg total) by mouth once daily.            While in the hospital, will manage blood pressure as follows; Adjust home antihypertensive regimen as follows- will keep nifedipine - increased to 90     Will utilize p.r.n. blood pressure medication only if patient's blood pressure greater than 180/110 and he develops symptoms such as worsening chest pain or shortness of breath.      VTE Risk Mitigation (From admission, onward)           Ordered     heparin 25,000 units in dextrose 5% (100 units/ml) IV bolus from bag HIGH INTENSITY nomogram - [facility]  As needed (PRN)        Question:  Heparin Infusion Adjustment (DO NOT MODIFY ANSWER)  Answer:  [email or web address]\Images\[email or web address]\HeparinInfusions\heparin HIGH INTENSITY nomogram for [facility] [id number]    03/29/24 1334     heparin 25,000 units in dextrose 5% (100 units/ml) IV bolus from bag HIGH INTENSITY nomogram - [facility]  As needed (PRN)        Question:  Heparin Infusion Adjustment (DO NOT MODIFY ANSWER)  Answer:  \[email or web address]\Images\Pharmacy\HeparinInfusions\heparin HIGH INTENSITY nomogram for [facility] [id number]    03/29/24 1336      heparin 25,000 units in dextrose 5% (100 units/ml) IV bolus from bag HIGH INTENSITY nomogram - [facility]  Once        Question:  Heparin Infusion Adjustment (DO NOT MODIFY ANSWER)  Answer:  [email or web address]\Images\Pharmacy\HeparinInfusions\heparin HIGH INTENSITY nomogram for [facility] [id number]    03/29/24 1333     heparin 25,000 units in dextrose 5% 250 mL (100 units/mL) infusion HIGH INTENSITY nomogram - [facility]  Continuous        Question:  Begin at (units/kg/hr)  Answer:  18 03/29/24 1333     heparin 25,000 units in dextrose 5% 250 mL (100 units/mL) infusion HIGH INTENSITY nomogram - [facility]  Continuous        Question:  Begin at (units/kg/hr)  Answer:  18 03/20/24 0747                    Discharge Planning   OXANA: 3/29/2024     Code Status: Full Code   Is the patient medically ready for discharge?: No    Reason for patient still in hospital (select all that apply): Patient trending condition  Discharge Plan A: Rehab (TBD)   Discharge Delays: None known at this time              Jori Byrd MD  Department of Hospital Medicine   [facility] - Telemetry Stepdown

## 2024-03-29 NOTE — ASSESSMENT & PLAN NOTE
Resolved    Patient's FSGs are uncontrolled due to hyperglycemia on current medication regimen.  Last A1c reviewed-   Lab Results   Component Value Date    HGBA1C >14.0 (H) 03/05/2024     Most recent fingerstick glucose reviewed-   Recent Labs   Lab 03/28/24  0819 03/28/24  1214 03/28/24  1635 03/28/24  2116   POCTGLUCOSE 262* 209* 196* 212*       Current correctional scale   LD SSI  Maintain anti-hyperglycemic dose as follows-   Antihyperglycemics (From admission, onward)      Start     Stop Route Frequency Ordered    03/27/24 1015  insulin aspart U-100 pen 0-5 Units         -- SubQ Every 4 hours PRN 03/27/24 0908    03/24/24 2100  insulin detemir U-100 (Levemir) pen 13 Units         -- SubQ 2 times daily 03/24/24 0929    03/24/24 0715  insulin aspart U-100 pen 4-6 Units         -- SubQ with breakfast 03/23/24 0913    03/20/24 1645  insulin aspart U-100 pen 2-4 Units         -- SubQ with dinner 03/20/24 0644    03/20/24 1130  insulin aspart U-100 pen 3-5 Units         -- SubQ with lunch 03/20/24 0644          Hold Oral hypoglycemics while patient is in the hospital.  Will keep patient on DKA pathway with insulin drip and fluid with protocol in place for switching to subcutaneous insulin as anion gap closes.      DKA    - Resolved  - Continue insulin regimen and BG checks TIDWM and QHS and 2am  - patient tends to become hypoglycemic around dinnertime  - could be insulin stacking from breakfast and lunch insulin aspart given poor PO intake despite assistance with feeding   - detemir 14 unit BID  - aspart scheduled w/ only breakfast and lunch and dinner with different units  -Inpatient consult to Endocrine

## 2024-03-29 NOTE — ASSESSMENT & PLAN NOTE
Refill Request    Medication:     dilTIAZem (DILT-XR) 180 MG 24 hr capsule 90 capsule 0 8/16/2017     Sig - Route: Take 1 capsule by mouth daily. - Oral      LOV: 10/4/17 ([facility]) MWV  Apt: 11/16/17 ([facility]) ST     CMP: 10/5/17    BP: 110/70 P: 79 on 10/4/17     Refilled per protocol.          Sams in placed, placed by urology  Flomax daily

## 2024-03-29 NOTE — SUBJECTIVE & OBJECTIVE
Medications:  Continuous Infusions:  Scheduled Meds:   atorvastatin  40 mg Oral Daily    collagenase   Topical (Top) Daily    ertapenem (INVanz) IV (PEDS and ADULTS)  1 g Intravenous Q24H    gabapentin  300 mg Oral BID    insulin aspart U-100  2-4 Units Subcutaneous with dinner    insulin aspart U-100  3-5 Units Subcutaneous with lunch    insulin aspart U-100  4-6 Units Subcutaneous with breakfast    insulin detemir U-100  13 Units Subcutaneous BID    NIFEdipine  90 mg Oral Daily    pantoprazole  40 mg Oral Daily    sodium chloride 0.9%  10 mL Intravenous Q6H    tamsulosin  0.4 mg Oral Daily    traZODone  25 mg Oral QHS    voriconazole  200 mg Oral BID     PRN Meds:acetaminophen, aluminum-magnesium hydroxide-simethicone, dextrose 10%, dextrose 10%, glucagon (human recombinant), glucose, glucose, HYDROmorphone, insulin aspart U-100, melatonin, ondansetron, oxyCODONE, sodium chloride 0.9%, Flushing PICC/Midline Protocol **AND** sodium chloride 0.9% **AND** sodium chloride 0.9%, triamcinolone acetonide 0.025%     Objective:     Vital Signs (Most Recent):  Temp: 97.6 °F (36.4 °C) (03/29/24 0419)  Pulse: 86 (03/29/24 0743)  Resp: 18 (03/29/24 0743)  BP: (!) 117/57 (03/29/24 0743)  SpO2: (!) 90 % (03/29/24 0743) Vital Signs (24h Range):  Temp:  [97.6 °F (36.4 °C)-99.3 °F (37.4 °C)] 97.6 °F (36.4 °C)  Pulse:  [80-86] 86  Resp:  [17-18] 18  SpO2:  [90 %-95 %] 90 %  BP: (117-152)/(56-67) 117/57          Physical Exam  Vitals and nursing note reviewed.   Constitutional:       Appearance: He is obese. He is ill-appearing.   HENT:      Head: Normocephalic.   Cardiovascular:      Rate and Rhythm: Normal rate and regular rhythm.   Skin:     General: Skin is dry.      Findings: Bruising, lesion and rash present.   Neurological:      Mental Status: Mental status is at baseline. He is disoriented.          Significant Labs:  BMP:   Recent Labs   Lab 03/28/24  0940 03/29/24  0804   *  --     142   K 4.2 4.4   *  109   CO2 22*  --    BUN 34*  --    CREATININE 1.6*  --    CALCIUM 8.6*  --    MG 2.2  --      CBC:   Recent Labs   Lab 03/29/24  0804   WBC 9.83   RBC 2.99*   HGB 7.4*   HCT 24.2*      MCV 81*   MCH 24.7*   MCHC 30.6*       Significant Diagnostics:  I have reviewed all pertinent imaging results/findings within the past 24 hours.

## 2024-03-29 NOTE — SUBJECTIVE & OBJECTIVE
Interval History: NAEON. Pt refusing dressing changes despite analgesia. Reminded pt he has additional pain medicine available should he need it. Dressing changed later this morning after PRN pain medicine given.      Objective:     Vital Signs (Most Recent):  Temp: 97.6 °F (36.4 °C) (03/29/24 0419)  Pulse: 83 (03/29/24 0419)  Resp: 18 (03/29/24 0419)  BP: 123/67 (03/29/24 0419)  SpO2: (!) 92 % (03/29/24 0419) Vital Signs (24h Range):  Temp:  [97.6 °F (36.4 °C)-99.3 °F (37.4 °C)] 97.6 °F (36.4 °C)  Pulse:  [80-85] 83  Resp:  [17-20] 18  SpO2:  [91 %-95 %] 92 %  BP: (118-152)/(56-67) 123/67     Weight: 120.2 kg (264 lb 15.9 oz)  Body mass index is 39.13 kg/m².    Intake/Output Summary (Last 24 hours) at 3/29/2024 0635  Last data filed at 3/28/2024 1835  Gross per 24 hour   Intake 300 ml   Output 355 ml   Net -55 ml         Physical Exam  Constitutional:       General: He is not in acute distress.     Appearance: Normal appearance. He is well-developed. Ill appearance: chronically ill appearing.   HENT:      Head: Normocephalic and atraumatic.      Right Ear: External ear normal.      Left Ear: External ear normal.      Nose: Nose normal.   Eyes:      General:         Right eye: No discharge.         Left eye: No discharge.      Conjunctiva/sclera: Conjunctivae normal.   Cardiovascular:      Rate and Rhythm: Normal rate and regular rhythm.      Pulses: Normal pulses.   Pulmonary:      Effort: Pulmonary effort is normal. No respiratory distress.      Breath sounds: No stridor.   Abdominal:      General: Abdomen is flat.      Palpations: Abdomen is soft.      Tenderness: There is no abdominal tenderness.   Musculoskeletal:         General: Swelling and deformity present.      Cervical back: Normal range of motion.      Comments: S/p L-AKA, bandage CDI  RUE swelling d/t DVT   Skin:     General: Skin is warm and dry.   Neurological:      General: No focal deficit present.      Mental Status: He is alert.              Significant Labs: All pertinent labs within the past 24 hours have been reviewed.    Significant Imaging: I have reviewed all pertinent imaging results/findings within the past 24 hours.

## 2024-03-29 NOTE — NURSING
"Patient declined wound care after pain management med admin, states " I can get it done tomorrow". Patient educated on the importance of wound treatment. No apparent distress noted. Plan of care continues.    "

## 2024-03-29 NOTE — PLAN OF CARE
Problem: Adult Inpatient Plan of Care  Goal: Plan of Care Review  Outcome: Ongoing, Progressing     Problem: Diabetes Comorbidity  Goal: Blood Glucose Level Within Targeted Range  Outcome: Ongoing, Progressing     Problem: Adjustment to Illness (Sepsis/Septic Shock)  Goal: Optimal Coping  Outcome: Ongoing, Progressing     Problem: Bleeding (Sepsis/Septic Shock)  Goal: Absence of Bleeding  Outcome: Ongoing, Progressing     Problem: Glycemic Control Impaired (Sepsis/Septic Shock)  Goal: Blood Glucose Level Within Desired Range  Outcome: Ongoing, Progressing     Problem: Infection Progression (Sepsis/Septic Shock)  Goal: Absence of Infection Signs and Symptoms  Outcome: Ongoing, Progressing     Problem: Nutrition Impaired (Sepsis/Septic Shock)  Goal: Optimal Nutrition Intake  Outcome: Ongoing, Progressing     Problem: Infection  Goal: Absence of Infection Signs and Symptoms  Outcome: Ongoing, Progressing     Problem: Impaired Wound Healing  Goal: Optimal Wound Healing  Outcome: Ongoing, Progressing     Problem: Skin Injury Risk Increased  Goal: Skin Health and Integrity  Outcome: Ongoing, Progressing     Problem: Fall Injury Risk  Goal: Absence of Fall and Fall-Related Injury  Outcome: Ongoing, Progressing     Problem: Fluid and Electrolyte Imbalance (Acute Kidney Injury/Impairment)  Goal: Fluid and Electrolyte Balance  Outcome: Ongoing, Progressing     Problem: Oral Intake Inadequate (Acute Kidney Injury/Impairment)  Goal: Optimal Nutrition Intake  Outcome: Ongoing, Progressing     Problem: Renal Function Impairment (Acute Kidney Injury/Impairment)  Goal: Effective Renal Function  Outcome: Ongoing, Progressing     Pt. A&Ox3, denies any chest pain or SOB. Complained of L surgical site pain intermittently; PRN medication provided w/ intermittent relief. Frequent weight shift provided as pt. lets as pt. refusing positional turns most of the shift; education provided on the importance; however, reinforcement needed.  Sams catheter and EDMUNDO drain intact. Heparin gtt. started per order, Q6 PTT maintained. Blood sugar closely monitoring. Fall precaution maintained.

## 2024-03-29 NOTE — SUBJECTIVE & OBJECTIVE
"Interval HPI:   No acute events overnight. Patient in room 8078/8078 A. Blood glucose stable. BG above goal on current insulin regimen (SSI, prandial, and basal insulin ). Steroid use- None. Of note, SSI not being given.   2 Days Post-Op  Renal function- Abnormal - 1.5 cr   Vasopressors-  None     Diet diabetic  Calorie     Eatin%  Nausea: No  Hypoglycemia and intervention: No  Fever: No  TPN and/or TF: No    BP (!) 125/59 (BP Location: Left arm, Patient Position: Lying)   Pulse 86   Temp 98.6 °F (37 °C) (Axillary)   Resp 18   Ht 5' 9" (1.753 m)   Wt 120.2 kg (264 lb 15.9 oz)   SpO2 (!) 92%   BMI 39.13 kg/m²     Labs Reviewed and Include    Recent Labs   Lab 24  0804   *   CALCIUM 7.8*   ALBUMIN 1.6*   PROT 5.9*      K 4.4   CO2 25      BUN 35*   CREATININE 1.5*   ALKPHOS 70   ALT 8*   AST 18   BILITOT 0.2     Lab Results   Component Value Date    WBC 9.83 2024    HGB 7.4 (L) 2024    HCT 24.2 (L) 2024    MCV 81 (L) 2024     2024     No results for input(s): "TSH", "FREET4" in the last 168 hours.  Lab Results   Component Value Date    HGBA1C >14.0 (H) 2024       Nutritional status:   Body mass index is 39.13 kg/m².  Lab Results   Component Value Date    ALBUMIN 1.6 (L) 2024    ALBUMIN 1.7 (L) 2024    ALBUMIN 1.6 (L) 2024     Lab Results   Component Value Date    PREALBUMIN 17 (L) 2014    PREALBUMIN 16 (L) 2014    PREALBUMIN 19 (L) 2014       Estimated Creatinine Clearance: 54.5 mL/min (A) (based on SCr of 1.5 mg/dL (H)).    Accu-Checks  Recent Labs     24  0758 24  1242 24  1600 24  1836 24  2148 24  0819 24  1214 24  1635 24  2116 24  0906   POCTGLUCOSE 132* 174* 251* 250* 248* 262* 209* 196* 212* 303*       Current Medications and/or Treatments Impacting Glycemic Control  Immunotherapy:    Immunosuppressants       None      "     Steroids:   Hormones (From admission, onward)      Start     Stop Route Frequency Ordered    03/28/24 1128  melatonin tablet 6 mg         -- Oral Nightly PRN 03/28/24 1028          Pressors:    Autonomic Drugs (From admission, onward)      None          Hyperglycemia/Diabetes Medications:   Antihyperglycemics (From admission, onward)      Start     Stop Route Frequency Ordered    03/29/24 2100  insulin detemir U-100 (Levemir) pen 14 Units         -- SubQ 2 times daily 03/29/24 0936    03/29/24 1130  insulin aspart U-100 pen 3-6 Units         -- SubQ 3 times daily with meals 03/29/24 0936    03/29/24 0850  insulin aspart U-100 pen 0-5 Units         -- SubQ Before meals & nightly PRN 03/29/24 0850

## 2024-03-29 NOTE — ASSESSMENT & PLAN NOTE
Patient has swelling and pain in the right arm and some swelling in the left arm as well    -US b/l upper extremity for DVT showed clot  - resume AC today

## 2024-03-29 NOTE — ASSESSMENT & PLAN NOTE
Endocrinology consulted for BG management.   BG goal 140-180    - Increase Levemir (Insulin Detemir) to 14 units BID   - Novolog (Insulin Aspart) 3-6 units with meals (Administer    3    units if patient eats 25-50% of meal, administer    6    units if patient eats > 50% of meal.) and prn for BG excursions [facility] SSI (150/50)  - BG checks AC/HS  - Hypoglycemia protocol in place    ** Please notify Endocrine for any change and/or advance in diet**  ** Please call Endocrine for any BG related issues **    Discharge Planning:   TBD. Please notify endocrinology prior to discharge.  Plan Pending:  - Lantus 28 units nightly  - Novolog 5 units TIDWM  - Novolog SSI    150 - 200 + 2 unit    201 - 250 + 4 units    251 - 300 + 6 units    301 - 350 + 8 units       > 350   + 10 units  - Send logs in 3 days    Education:  Discharge Teaching:    Reviewed topics related to DM including: the need for insulin, how insulin works, what makes it a high risk medication, the importance of immediate follow up with either PCP or endocrine provider, importance of and how to check BG, how to record BG on logs, how to administer insulin, appropriate insulin administration sites, importance of rotating injection sites, hyper/hypoglycemia, how and when to treat hypoglycemia, when to hold insulin, how the correction scale works, importance of storing unused insulin in the refrigerator, and when to seek medical attention.  Patient verbalized understanding, answered all questions to patient's satisfaction. Blood sugar logs given to patient.     Hypoglycemia (Low Blood Sugar)  Too little glucose (sugar) in your blood is called hypoglycemia or low blood sugar. Diabetes itself doesnt cause low blood sugar. But some of the treatments for diabetes, such as pills or insulin, may increase your risk for it. Low blood sugar may cause you to lose consciousness or have a seizure. So always treat low blood sugar right away.    Special note: Always carry a source  of fast-acting sugar and a snack in case of hypoglycemia     What You May Notice  If you have low blood sugar, you may have these symptoms:  Shakiness or dizziness  Cold, clammy skin or sweating  Feelings of hunger  Headache  Nervousness  A hard, fast heartbeat  Weakness  Confusion or irritability  Blurred vision  What You Should Do  First, check your blood sugar. If it is too low (out of your target range), eat or drink 15 to 20 grams of fast-acting sugar. This may be 3 to 4 glucose tablets, 4 oz (half a cup) fruit juice or regular (non-diet) soda, 8 oz (one cup ) fat-free milk, or 1 tablespoon of honey. Dont take more than this, or your blood sugar may go too high.  Wait 15 minutes. Then recheck your blood sugar if you can.  If your blood sugar is still too low, repeat the steps above and check your blood sugar again. If your blood sugar still has not returned to your target range, contact your healthcare provider or seek emergency care.  Once your blood sugar returns to target range, eat a snack or meal.  Preventing Low Blood Sugar  Eat your meals and snacks at the same times each day. Dont skip meals!  Ask your healthcare provider if it is safe for you to drink alcohol. Never drink on an empty stomach.  Take your medication at the prescribed times.  Always carry a source of fast-acting sugar and a snack when youre away from home.  Other Things to Do  Carry a medical ID card or wear a medical alert bracelet or necklace. It should say that you have diabetes. It should also say what to do if you pass out or have a seizure.  Make sure your family, friends, and coworkers know the signs of low blood sugar. Tell them what to do if your blood sugar falls very low and you cant treat yourself.  Keep a glucagon emergency kit handy. Be sure your family, friends, and coworkers know how and when to use it. Check it regularly and replace the glucagon before it expires.  Talk to your healthcare team about other things you  can do to prevent low blood sugar.    If you experience hypoglycemia several times, call your doctor.   © 8814-7240 [employer], [address] [place] [address], [place] [address]. All rights reserved. This information is not intended as a substitute for professional medical care. Always follow your healthcare professional's instructions.

## 2024-03-29 NOTE — PROGRESS NOTES
[facility] - Telemetry Stepdown  Endocrinology  Progress Note    Admit Date: 3/5/2024     Reason for Consult: Management of T2DM, Hyperglycemia     Diabetes diagnosis year: > 25 years    Home Diabetes Medications:  Novolog 6 units w/ meals and Lantus 45 units nightly (states he is not taking).     How often checking glucose at home?  Not really checking    BG readings on regimen: 200's  Hypoglycemia on the regimen?  Yes (When he was taking the Lantus)  Missed doses on regimen?  Yes    Diabetes Complications include:     Hyperglycemia, Hypoglycemia , Diabetic chronic kidney disease     , Diabetic dermatitis, Foot ulcer  , and Periodontal disease    Complicating diabetes co morbidities:   HTN; HLD      HPI: Saji Castañeda is a 75 yo M with a history of Afib, T2D, HLD, HTN, chronic osteomyelitis of L heel, s/p L TMA, PID, ESBL Klebsiella and Acinetobacter bacteremia who was admitted after being found down at home and DKA. Endocrine consulted to manage hyperglycemia and type 2 diabetes.   Hemoglobin A1C   Date Value Ref Range Status   03/05/2024 >14.0 (H) 4.0 - 5.6 % Final     Comment:     ADA Screening Guidelines:  5.7-6.4%  Consistent with prediabetes  >or=6.5%  Consistent with diabetes    High levels of fetal hemoglobin interfere with the HbA1C  assay. Heterozygous hemoglobin variants (HbS, HgC, etc)do  not significantly interfere with this assay.   However, presence of multiple variants may affect accuracy.     09/13/2023 11.0 (H) 4.5 - 5.7 % Final   03/10/2023 8.4 (H) 4.0 - 5.6 % Final     Comment:     ADA Screening Guidelines:  5.7-6.4%  Consistent with prediabetes  >or=6.5%  Consistent with diabetes    High levels of fetal hemoglobin interfere with the HbA1C  assay. Heterozygous hemoglobin variants (HbS, HgC, etc)do  not significantly interfere with this assay.   However, presence of multiple variants may affect accuracy.     09/17/2022 9.9 (H) 4.0 - 5.6 % Final     Comment:     ADA Screening Guidelines:  5.7-6.4%   "Consistent with prediabetes  >or=6.5%  Consistent with diabetes    High levels of fetal hemoglobin interfere with the HbA1C  assay. Heterozygous hemoglobin variants (HbS, HgC, etc)do  not significantly interfere with this assay.   However, presence of multiple variants may affect accuracy.              Interval HPI:   No acute events overnight. Patient in room 8078/8078 A. Blood glucose stable. BG above goal on current insulin regimen (SSI, prandial, and basal insulin ). Steroid use- None. Of note, SSI not being given.   2 Days Post-Op  Renal function- Abnormal - 1.5 cr   Vasopressors-  None     Diet diabetic  Calorie     Eatin%  Nausea: No  Hypoglycemia and intervention: No  Fever: No  TPN and/or TF: No    BP (!) 125/59 (BP Location: Left arm, Patient Position: Lying)   Pulse 86   Temp 98.6 °F (37 °C) (Axillary)   Resp 18   Ht 5' 9" (1.753 m)   Wt 120.2 kg (264 lb 15.9 oz)   SpO2 (!) 92%   BMI 39.13 kg/m²     Labs Reviewed and Include    Recent Labs   Lab 24  0804   *   CALCIUM 7.8*   ALBUMIN 1.6*   PROT 5.9*      K 4.4   CO2 25      BUN 35*   CREATININE 1.5*   ALKPHOS 70   ALT 8*   AST 18   BILITOT 0.2     Lab Results   Component Value Date    WBC 9.83 2024    HGB 7.4 (L) 2024    HCT 24.2 (L) 2024    MCV 81 (L) 2024     2024     No results for input(s): "TSH", "FREET4" in the last 168 hours.  Lab Results   Component Value Date    HGBA1C >14.0 (H) 2024       Nutritional status:   Body mass index is 39.13 kg/m².  Lab Results   Component Value Date    ALBUMIN 1.6 (L) 2024    ALBUMIN 1.7 (L) 2024    ALBUMIN 1.6 (L) 2024     Lab Results   Component Value Date    PREALBUMIN 17 (L) 2014    PREALBUMIN 16 (L) 2014    PREALBUMIN 19 (L) 2014       Estimated Creatinine Clearance: 54.5 mL/min (A) (based on SCr of 1.5 mg/dL (H)).    Accu-Checks  Recent Labs     24  0758 24  1242 24  1600 " 03/27/24  1836 03/27/24  2148 03/28/24  0819 03/28/24  1214 03/28/24  1635 03/28/24  2116 03/29/24  0906   POCTGLUCOSE 132* 174* 251* 250* 248* 262* 209* 196* 212* 303*       Current Medications and/or Treatments Impacting Glycemic Control  Immunotherapy:    Immunosuppressants       None          Steroids:   Hormones (From admission, onward)      Start     Stop Route Frequency Ordered    03/28/24 1128  melatonin tablet 6 mg         -- Oral Nightly PRN 03/28/24 1028          Pressors:    Autonomic Drugs (From admission, onward)      None          Hyperglycemia/Diabetes Medications:   Antihyperglycemics (From admission, onward)      Start     Stop Route Frequency Ordered    03/29/24 2100  insulin detemir U-100 (Levemir) pen 14 Units         -- SubQ 2 times daily 03/29/24 0936    03/29/24 1130  insulin aspart U-100 pen 3-6 Units         -- SubQ 3 times daily with meals 03/29/24 0936    03/29/24 0850  insulin aspart U-100 pen 0-5 Units         -- SubQ Before meals & nightly PRN 03/29/24 0850            ASSESSMENT and PLAN    ID  * Osteomyelitis of left lower extremity  Managed per primary team  Optimize BG control to improve wound healing        Oncology  Iron deficiency anemia      May affect accuracy of A1C     Endocrine  Insulin dependent type 2 diabetes mellitus  Endocrinology consulted for BG management.   BG goal 140-180    - Increase Levemir (Insulin Detemir) to 15 units BID (0.5 u/kg/day)   - Novolog (Insulin Aspart) 4-8 units with meals (Administer    4    units if patient eats 25-50% of meal, administer    8    units if patient eats > 50% of meal.) and prn for BG excursions [facility] SSI (150/25)  - BG checks AC/HS  - Hypoglycemia protocol in place    ** Please notify Endocrine for any change and/or advance in diet**  ** Please call Endocrine for any BG related issues **    Discharge Planning:   TBD. Please notify endocrinology prior to discharge.    Missy Yadav PA-C  Endocrinology  [facility] - Telemetry  Stepdown

## 2024-03-29 NOTE — ASSESSMENT & PLAN NOTE
Resolved w/ source control, d/c abx today    Patient with Proteus and enterobacter on leg cultures collected by Podiatry with concern for acute on chronic osteo. Fungal culture grew fusarium. He has started spiking fever and still is not agreeable to amputation, will do infectious work up to look out for source.    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 Can discontinue antimicrobials 24-48 hours after surgery as long term treatment is not indicated.

## 2024-03-29 NOTE — ASSESSMENT & PLAN NOTE
Patient with Persistent (7 days or more) atrial fibrillation which is uncontrolled currently with    . Patient is currently in sinus rhythm.[id number] Score: 4. . Anticoagulation indicated. Anticoagulation done with heparin/eliquis .    - resuming AC today

## 2024-03-29 NOTE — ASSESSMENT & PLAN NOTE
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 Patient febrile today and WBC 11.67. Xray L foot shows acute OM to calcaneus. Patient was previously not amendable to AKA but has now agreed to continue with amputation. S/p L AKA 3/27    - S/p L AKA w/ EDMUNDO drain intact. Incision c/d/i8  - Dressing changed this morning  - Stump protector ordered and will be delivered bedside  - May restart plavix, heparin gtt  - Recommend BG <180 to lessen risk of infection of amputation site leading to more proximal amputation or death  - Recommend PT/OT  - Okay to d/c bedrest  - Vascular surgery will continue to follow

## 2024-03-29 NOTE — ASSESSMENT & PLAN NOTE
D/c abx tomorrow per ID  On ertapenem to cover proteus and enterobacter from left leg wound cx, continue for 24-48h post-op per ID

## 2024-03-29 NOTE — PROGRESS NOTES
[facility] - Telemetry Stepdown  Infectious Disease  Progress Note    Patient Name: Saji Castañeda  MRN: [id number]  Admission Date: 3/5/2024  Length of Stay: 24 days  Attending Physician: Jm Rosa MD  Primary Care Provider: [facility] -    Isolation Status: Contact  Assessment/Plan:      ID  Cellulitis of left lower leg  75 y/o male with A-fib, uncontrolled DMII, HLD, HTN, chronic heel ulcerations c/b chronic osteomyelitis, chronic leg wounds admitted 3/5 for DKA. ID consulted for infected chronic wounds/ulcerations and abx guidance as with hx of MDROs.     Podiatry obtained left heel wound cultures (probes to bone). Cx + Proteus mirabilis, E. Cloaecae, Fusarium and C. Albicans. Patient has been on IV Ertapenem and Voriconazole. Despite antimicrobials developed fevers. He is now s/p Left AKA on 3/27. Afebrile post op. No leukocytosis. HDS.    Recommendations  Source control achieved with proximal amputation. Can discontinue antimicrobials 24 hours as long term treatment is not indicated.  ID will sign off. Feel free to re-consult ID as needed.        Thank you for the consult. Please secure chat for any questions.  Kimmy Payne PA-C      Subjective:     Principal Problem:Osteomyelitis of left lower extremity    HPI: 75 y/o male with a.fib, DMII uncontrolled, HLD, HTN, chronic heel ulcerations c/b chronic osteomyelitis, hx of  Acinetobacter and ESBL klebsiella admitted for DKA. ID consulted for chronic wounds/ulcerations and abx guidance as with hx of MDROs.  ID followed and rec 6 weeks of IV Ertapenem and 6 months of Voriconazole for LLE cultures showing Proteus M/E cloacae and Fusarium/C Albicans respectively on cultures.    3/19 spike fever 102.3.  Blood cultures sent and are NGTD.  Developed urinary retention and fung placed - UA negative.  CXR done showing: lung zones stable, no significant new areas of consolidation or volume loss.  US UEs 3/14 showed RUE brachial DVT and is on heparin  and clopidogrel.  ID consulted for fevers despite abx/antifungals.  Patient seen and denies symptoms when had fever.  LLE pain stable.  Patient had declined vasc sx rec of AKA/BKA but now is amenable and is scheduled for 3/27.  The patient denies any recent fever, chills, or sweats.    Past Medical History:   Diagnosis Date    Arthritis     legs    Bacteremia due to Gram-negative bacteria 3/23/2021    Diabetes mellitus     Diabetes mellitus, type 2     Hyperlipidemia     Hypertension     Osteomyelitis     Palliative care encounter 5/24/2023       Past Surgical History:   Procedure Laterality Date    ABOVE-KNEE AMPUTATION Left 3/27/2024    Procedure: AMPUTATION, ABOVE KNEE;  Surgeon: Adal Arellano MD;  Location: [facility] OR [address] [place] [address];  Service: Vascular;  Laterality: Left;    ANGIOGRAPHY OF LOWER EXTREMITY N/A 2/3/2021    Procedure: Angiogram Extremity Bilateral;  Surgeon: Ernst Chacko MD;  Location: [facility] OR [address] [place] [address];  Service: Peripheral Vascular;  Laterality: N/A;  7.4 mintues fluoroscopy time  816.15 mGy  170.17 Gycm2    AORTOGRAPHY WITH EXTREMITY RUNOFF Bilateral 2/3/2021    Procedure: AORTOGRAM, WITH EXTREMITY RUNOFF;  Surgeon: Ernst Chacko MD;  Location: [facility] OR [address] [place] [address];  Service: Peripheral Vascular;  Laterality: Bilateral;    DEBRIDEMENT OF FOOT Left 2/23/2021    Procedure: DEBRIDEMENT, LEFT HEEL;  Surgeon: Mayra Schroeder DPM;  Location: [facility] OR [address] [place] [address];  Service: Podiatry;  Laterality: Left;    FOOT AMPUTATION  October 2010    left high midfoot amputation    IMPLANTATION OF LEADLESS PACEMAKER N/A 10/12/2023    Procedure: [id number];  Surgeon: VANESSA Delatorre MD;  Location: [facility] EP LAB;  Service: Cardiology;  Laterality: N/A;  AVB, MICRA, EH, [place]ES, [place] [id number]       Review of patient's allergies indicates:  No Known Allergies    Medications:  Medications Prior to Admission   Medication Sig    acetaminophen (TYLENOL) 325 MG tablet Take 650 mg by mouth every 6  "(six) hours as needed for Temperature greater than.    acidophilus-pectin, citrus 100 million cell-10 mg Cap Take 1 capsule by mouth 2 (two) times a day.    ascorbic acid, vitamin C, (VITAMIN C) 500 MG tablet Take 500 mg by mouth 2 (two) times daily.    B COMPLEX-VITAMIN B12 tablet Take 1 tablet by mouth once daily.    BD AUTOSHIELD DUO PEN NEEDLE 30 gauge x 3/16" Ndle     BD ULTRA-FINE ADITYA PEN NEEDLE 32 gauge x 5/32" Ndle USE FOUR TIMES DAILY WITH MEALS AND NIGHTLY    blood sugar diagnostic (CONTOUR TEST STRIPS) Strp Inject 1 strip into the skin 2 (two) times daily before meals.    clopidogreL (PLAVIX) 75 mg tablet Take 1 tablet (75 mg total) by mouth once daily.    gabapentin (NEURONTIN) 300 MG capsule Take 300 mg by mouth 2 (two) times daily.    insulin aspart U-100 (NOVOLOG) 100 unit/mL (3 mL) InPn pen Inject 6 Units into the skin 3 (three) times daily with meals.    MICROLET LANCET Misc     multivitamin (THERAGRAN) per tablet Take 1 tablet by mouth once daily.    NIFEdipine (PROCARDIA-XL) 60 MG (OSM) 24 hr tablet Take 1 tablet (60 mg total) by mouth once daily.    pantoprazole (PROTONIX) 40 MG tablet Take 40 mg by mouth once daily. Before breakfast    pen needle, diabetic 32 gauge x 5/32" Ndle use as directed with insulin    rosuvastatin (CRESTOR) 40 MG Tab Take 40 mg by mouth once daily.    tamsulosin (FLOMAX) 0.4 mg Cap Take 0.4 mg by mouth once daily.    triamcinolone acetonide 0.025% (KENALOG) 0.025 % Oint Apply topically 2 (two) times daily as needed (to affected area).    [DISCONTINUED] apixaban (ELIQUIS) 5 mg Tab Take 1 tablet (5 mg total) by mouth 2 (two) times daily.    [DISCONTINUED] diphenhydrAMINE (BENADRYL) 25 mg capsule No directions listed on the patients medication list.    [DISCONTINUED] furosemide (LASIX) 40 MG tablet Take 20 mg by mouth.    [DISCONTINUED] glipiZIDE (GLUCOTROL) 10 MG tablet Take 20 mg by mouth 2 (two) times a day.    [DISCONTINUED] hydrALAZINE (APRESOLINE) 100 MG tablet Take " 1 tablet (100 mg total) by mouth every 8 (eight) hours.    [DISCONTINUED] isosorbide dinitrate (ISORDIL) 10 MG tablet Take 1 tablet (10 mg total) by mouth 3 (three) times daily.    [DISCONTINUED] LANTUS SOLOSTAR U-100 INSULIN glargine 100 units/mL SubQ pen Inject 45 Units into the skin every evening. (Patient taking differently: Inject 45 Units into the skin once daily.)    [DISCONTINUED] miconazole NITRATE 2 % (MICOTIN) 2 % top powder Apply topically 2 (two) times daily.    [DISCONTINUED] oxyCODONE 5 mg TbOr Take 1 tablet by mouth every 6 (six) hours as needed (Pain (Max 5 daily)).    [DISCONTINUED] sodium hypochlorite 0.5 % (DAKIN'S SOLUTION) external solution Apply topically once daily.     Antibiotics (From admission, onward)      Start     Stop Route Frequency Ordered    03/08/24 1230  ertapenem (INVANZ) 1 g in sodium chloride 0.9 % 100 mL IVPB (MB+)         -- IV Every 24 hours (non-standard times) 03/08/24 1115          Antifungals (From admission, onward)      Start     Stop Route Frequency Ordered    03/15/24 1045  voriconazole tablet 200 mg         -- Oral 2 times daily 03/15/24 0936          Antivirals (From admission, onward)      None             Immunization History   Administered Date(s) Administered    COVID-19, MRNA, LN-S, PF (MODERNA FULL 0.5 ML DOSE) 05/24/2021, 07/13/2021    Influenza - Trivalent (ADULT) 10/08/2020    PPD Test 12/09/2020, 03/02/2021, 06/21/2022, 03/18/2024    Pneumococcal Conjugate - 13 Valent 10/16/2018       Family History       Problem Relation (Age of Onset)    Cancer Brother    Diabetes Mother, Sister    Heart disease Mother    Stroke Sister          Social History     Socioeconomic History    Marital status: Single   Tobacco Use    Smoking status: Never    Smokeless tobacco: Never   Substance and Sexual Activity    Alcohol use: No     Comment: occassional    Drug use: No    Sexual activity: Not Currently   Social History Narrative    Not currently working; lives with family      Social Determinants of Health     Financial Resource Strain: Medium Risk (3/5/2024)    Overall Financial Resource Strain (CARDIA)     Difficulty of Paying Living Expenses: Somewhat hard   Food Insecurity: No Food Insecurity (3/5/2024)    Hunger Vital Sign     Worried About Running Out of Food in the Last Year: Never true     Ran Out of Food in the Last Year: Never true   Transportation Needs: No Transportation Needs (3/5/2024)    PRAPARE - Transportation     Lack of Transportation (Medical): No     Lack of Transportation (Non-Medical): No   Physical Activity: Inactive (3/5/2024)    Exercise Vital Sign     Days of Exercise per Week: 0 days     Minutes of Exercise per Session: 0 min   Stress: No Stress Concern Present (3/5/2024)    [place] [place] [employer] - Occupational Stress Questionnaire     Feeling of Stress : Only a little   Social Connections: Socially Isolated (3/5/2024)    Social Connection and Isolation Panel [NHANES]     Frequency of Communication with Friends and Family: More than three times a week     Frequency of Social Gatherings with Friends and Family: Patient declined     Attends Sabianism Services: Never     Active Member of Clubs or Organizations: No     Attends Club or Organization Meetings: Never     Marital Status: Never    Housing Stability: Low Risk  (3/5/2024)    Housing Stability Vital Sign     Unable to Pay for Housing in the Last Year: No     Number of Places Lived in the Last Year: 1     Unstable Housing in the Last Year: No     Review of Systems   Constitutional:  Negative for chills, diaphoresis and fever.   Respiratory:  Negative for shortness of breath.    Cardiovascular:  Negative for chest pain.   Gastrointestinal:  Negative for abdominal pain, diarrhea, nausea and vomiting.   Genitourinary:  Negative for dysuria and hematuria.   Musculoskeletal:  Positive for myalgias.   Skin:  Positive for color change and wound.   Psychiatric/Behavioral:  Negative for  agitation.      Objective:     Vital Signs (Most Recent):  Temp: 98.6 °F (37 °C) (03/29/24 0743)  Pulse: 80 (03/29/24 1128)  Resp: 18 (03/29/24 0923)  BP: (!) 125/59 (03/29/24 0908)  SpO2: (!) 92 % (03/29/24 0908) Vital Signs (24h Range):  Temp:  [97.6 °F (36.4 °C)-99.3 °F (37.4 °C)] 98.6 °F (37 °C)  Pulse:  [80-86] 80  Resp:  [17-18] 18  SpO2:  [90 %-95 %] 92 %  BP: (117-152)/(56-67) 125/59     Weight: 120.2 kg (264 lb 15.9 oz)  Body mass index is 39.13 kg/m².    Estimated Creatinine Clearance: 54.5 mL/min (A) (based on SCr of 1.5 mg/dL (H)).     Physical Exam  Constitutional:       General: He is not in acute distress.     Appearance: Normal appearance. He is well-developed. He is obese. He is not diaphoretic.   HENT:      Head: Normocephalic and atraumatic.      Right Ear: External ear normal.      Left Ear: External ear normal.      Nose: Nose normal.   Eyes:      General: No scleral icterus.        Right eye: No discharge.         Left eye: No discharge.      Extraocular Movements: Extraocular movements intact.      Conjunctiva/sclera: Conjunctivae normal.   Pulmonary:      Effort: Pulmonary effort is normal. No respiratory distress.      Breath sounds: No stridor.   Musculoskeletal:      Comments: AKA site dressed   Skin:     General: Skin is dry.      Coloration: Skin is not jaundiced or pale.      Findings: No erythema.   Neurological:      General: No focal deficit present.      Mental Status: He is alert and oriented to person, place, and time. Mental status is at baseline.   Psychiatric:         Mood and Affect: Mood normal.         Behavior: Behavior normal.         Thought Content: Thought content normal.         Judgment: Judgment normal.          Significant Labs: Blood Culture:   Recent Labs   Lab 10/02/23  1701 10/06/23  1131 10/06/23  1133 03/05/24  0800 03/19/24  1214   LABBLOO Gram stain aer bottle: Gram negative rods  Results called to and read back by:Alejandra Figueroa RN 10/03/2023  06:29   Gram stain jumana bottle:Gram positive cocci in clusters resembling Staph  Results called to and read back by:Mauro Prado Rn 10/07/2023  00:39  Reviewed by PhD Supervisor, Supervisor, or designee 10/04/2023  13:53  ACINETOBACTER BAUMANNII   further identified as Acinetobacter baumannii nosocomialis group  For susceptibility see order #[id number]  *  COAGULASE-NEGATIVE STAPHYLOCOCCUS SPECIES  Organism is a probable contaminant  *  Gram stain aer bottle: Gram negative rods  Gram stain jumana bottle: Gram negative rods  Results called to and read back by:Alejandra Figueroa RN 10/03/2023  06:29  Gram stain jumana bottle: Gram positive cocci in clusters resembling Staph  Results called to and read back by: Ricky Munguia RN 10/05/2023  06:53  KLEBSIELLA PNEUMONIAE ESBL*  ACINETOBACTER BAUMANNII   further identified as Acinetobacter baumannii nosocomialis group  * No growth after 5 days. No growth after 5 days. No growth after 5 days.  No growth after 5 days. No growth after 5 days.  No growth after 5 days.       CBC:   Recent Labs   Lab 03/28/24  0040 03/28/24  0940 03/29/24  0804   WBC 11.48 11.00 9.83   HGB 7.5* 7.9* 7.4*   HCT 24.6* 25.8* 24.2*    452* 424       CMP:   Recent Labs   Lab 03/28/24  0040 03/28/24  0940 03/29/24  0804    143 142   K 4.5 4.2 4.4    111* 109   CO2 20* 22* 25   * 228* 300*   BUN 31* 34* 35*   CREATININE 1.7* 1.6* 1.5*   CALCIUM 8.0* 8.6* 7.8*   PROT 6.5 6.9 5.9*   ALBUMIN 1.6* 1.7* 1.6*   BILITOT 0.2 0.2 0.2   ALKPHOS 70 73 70   AST 23 27 18   ALT 8* 8* 8*   ANIONGAP 12 10 8       Microbiology Results (last 7 days)       Procedure Component Value Units Date/Time    Blood culture [[id number]] Collected: 03/19/24 1214    Order Status: Completed Specimen: Blood Updated: 03/24/24 1412     Blood Culture, Routine No growth after 5 days.    Blood culture [[id number]] Collected: 03/19/24 1214    Order Status: Completed Specimen: Blood Updated: 03/24/24 1412      Blood Culture, Routine No growth after 5 days.          Recent Lab Results  (Last 5 results in the past 24 hours)        03/29/24  0906   03/29/24  0804   03/28/24  2116   03/28/24  1635   03/28/24  1214        Albumin   1.6             ALP   70             ALT   8             Anion Gap   8             AST   18             Baso #   0.02             Basophil %   0.2             BILIRUBIN TOTAL   0.2  Comment: For infants and newborns, interpretation of results should be based  on gestational age, weight and in agreement with clinical  observations.    Premature Infant recommended reference ranges:  Up to 24 hours.............<8.0 mg/dL  Up to 48 hours............<12.0 mg/dL  3-5 days..................<15.0 mg/dL  6-29 days.................<15.0 mg/dL               BUN   35             Calcium   7.8             Chloride   109             CO2   25             Creatinine   1.5             Differential Method   Automated             eGFR   48.2             Eos #   0.3             Eos %   3.2             Glucose   300             Gran # (ANC)   7.7             Gran %   77.8             Hematocrit   24.2             Hemoglobin   7.4             Immature Grans (Abs)   0.05  Comment: Mild elevation in immature granulocytes is non specific and   can be seen in a variety of conditions including stress response,   acute inflammation, trauma and pregnancy. Correlation with other   laboratory and clinical findings is essential.               Immature Granulocytes   0.5             Lymph #   1.1             Lymph %   10.8             Magnesium    2.2             MCH   24.7             MCHC   30.6             MCV   81             Mono #   0.7             Mono %   7.5             MPV   9.2             nRBC   0             Platelet Count   424             POCT Glucose 303     212   196   209       Potassium   4.4             PROTEIN TOTAL   5.9             RBC   2.99             RDW   16.2             Sodium   142             WBC    9.83                                    Significant Imaging:     Imaging Results              X-Ray Chest AP Portable (Final result)  Result time 03/05/24 09:57:35      Final result by Maddy Weeks MD (03/05/24 09:57:35)                   Impression:      Mild increased pulmonary vascularity centrally, not significantly changed from the prior study.      Electronically signed by: Maddy Weeks MD  Date:    03/05/2024  Time:    09:57               Narrative:    EXAMINATION:  XR CHEST AP PORTABLE    CLINICAL HISTORY:  Sepsis;    TECHNIQUE:  Single frontal view of the chest was performed.    COMPARISON:  10/12/2023    FINDINGS:  The cardiac silhouette is midline.  Leadless pacer.    The pulmonary vascularity is increased centrally.    No focal airspace disease.    No pleural effusion.  No pneumothorax.    The osseous structures appear normal.

## 2024-03-29 NOTE — ASSESSMENT & PLAN NOTE
Resolving    Patient with acute kidney injury/acute renal failure likely due to pre-renal azotemia due to dehydration and post-obstructive d/t urinary retention  YNES is currently stable. Baseline creatinine  1.1  - Labs reviewed- Renal function/electrolytes with Estimated Creatinine Clearance: 51.1 mL/min (A) (based on SCr of 1.6 mg/dL (H)). according to latest data. Monitor urine output and serial BMP and adjust therapy as needed. Avoid nephrotoxins and renally dose meds for GFR listed above.    Creatinine 2.1 on admit, baseline around 1.1  - prerenal vs obstructive (fung placed)     Lab Results   Component Value Date    CREATININE 1.6 (H) 03/28/2024       Plan:   - Strict I&Os and daily weights   - Daily BMP  - Trend sCr  - Avoid nephrotoxic agents such as NSAIDs, ACEi, ARBs and IV radiocontrast.  - Renally dose meds to current GFR.  - Maintain MAP > 65.  - No indications for HD at this time.   - Maintain electrolytes at goal: Mg > 2, Phos > 3, K > 4.

## 2024-03-29 NOTE — ASSESSMENT & PLAN NOTE
- eliquis transitioned to Heparin which was paused temporarily d/t concern of new hematuria which developed 3/25. Discussed with urology, the hematuria resolved with repositioning of the fung. Heparin resumed, but will temporarily be paused as patient needs preoperative blood transfusion and has minimal IV access d/t placement difficulties & limb alert on R-arm d/t DVT    - Hgb stable, drain site benign, resuming AC per vasc surg

## 2024-03-30 LAB
ALBUMIN SERPL BCP-MCNC: 1.5 G/DL (ref 3.5–5.2)
ALLENS TEST: ABNORMAL
ALP SERPL-CCNC: 241 U/L (ref 55–135)
ALT SERPL W/O P-5'-P-CCNC: 15 U/L (ref 10–44)
ANION GAP SERPL CALC-SCNC: 8 MMOL/L (ref 8–16)
APTT PPP: 52.4 SEC (ref 21–32)
AST SERPL-CCNC: 62 U/L (ref 10–40)
BASOPHILS # BLD AUTO: 0.02 K/UL (ref 0–0.2)
BASOPHILS NFR BLD: 0.2 % (ref 0–1.9)
BILIRUB SERPL-MCNC: 0.2 MG/DL (ref 0.1–1)
BNP SERPL-MCNC: 767 PG/ML (ref 0–99)
BUN SERPL-MCNC: 35 MG/DL (ref 8–23)
CALCIUM SERPL-MCNC: 8.3 MG/DL (ref 8.7–10.5)
CHLORIDE SERPL-SCNC: 108 MMOL/L (ref 95–110)
CO2 SERPL-SCNC: 24 MMOL/L (ref 23–29)
CREAT SERPL-MCNC: 1.4 MG/DL (ref 0.5–1.4)
DELSYS: ABNORMAL
DIFFERENTIAL METHOD BLD: ABNORMAL
EOSINOPHIL # BLD AUTO: 0.5 K/UL (ref 0–0.5)
EOSINOPHIL NFR BLD: 4.9 % (ref 0–8)
ERYTHROCYTE [DISTWIDTH] IN BLOOD BY AUTOMATED COUNT: 16.2 % (ref 11.5–14.5)
EST. GFR  (NO RACE VARIABLE): 52.4 ML/MIN/1.73 M^2
FLOW: 2
GLUCOSE SERPL-MCNC: 198 MG/DL (ref 70–110)
HCO3 UR-SCNC: 27.9 MMOL/L (ref 24–28)
HCT VFR BLD AUTO: 23.1 % (ref 40–54)
HGB BLD-MCNC: 7.1 G/DL (ref 14–18)
IMM GRANULOCYTES # BLD AUTO: 0.04 K/UL (ref 0–0.04)
IMM GRANULOCYTES NFR BLD AUTO: 0.4 % (ref 0–0.5)
LYMPHOCYTES # BLD AUTO: 1.3 K/UL (ref 1–4.8)
LYMPHOCYTES NFR BLD: 14.4 % (ref 18–48)
MAGNESIUM SERPL-MCNC: 2.1 MG/DL (ref 1.6–2.6)
MCH RBC QN AUTO: 25.3 PG (ref 27–31)
MCHC RBC AUTO-ENTMCNC: 30.7 G/DL (ref 32–36)
MCV RBC AUTO: 82 FL (ref 82–98)
MODE: ABNORMAL
MONOCYTES # BLD AUTO: 0.7 K/UL (ref 0.3–1)
MONOCYTES NFR BLD: 7.9 % (ref 4–15)
NEUTROPHILS # BLD AUTO: 6.7 K/UL (ref 1.8–7.7)
NEUTROPHILS NFR BLD: 72.2 % (ref 38–73)
NRBC BLD-RTO: 0 /100 WBC
PCO2 BLDA: 43.4 MMHG (ref 35–45)
PH SMN: 7.42 [PH] (ref 7.35–7.45)
PLATELET # BLD AUTO: 435 K/UL (ref 150–450)
PMV BLD AUTO: 8.9 FL (ref 9.2–12.9)
PO2 BLDA: 38 MMHG (ref 40–60)
POC BE: 3 MMOL/L
POC SATURATED O2: 73 % (ref 95–100)
POC TCO2: 29 MMOL/L (ref 24–29)
POCT GLUCOSE: 194 MG/DL (ref 70–110)
POCT GLUCOSE: 203 MG/DL (ref 70–110)
POCT GLUCOSE: 206 MG/DL (ref 70–110)
POCT GLUCOSE: 228 MG/DL (ref 70–110)
POCT GLUCOSE: 311 MG/DL (ref 70–110)
POTASSIUM SERPL-SCNC: 4.3 MMOL/L (ref 3.5–5.1)
PROT SERPL-MCNC: 6.4 G/DL (ref 6–8.4)
RBC # BLD AUTO: 2.81 M/UL (ref 4.6–6.2)
SAMPLE: ABNORMAL
SITE: ABNORMAL
SODIUM SERPL-SCNC: 140 MMOL/L (ref 136–145)
WBC # BLD AUTO: 9.26 K/UL (ref 3.9–12.7)

## 2024-03-30 PROCEDURE — 25000003 PHARM REV CODE 250

## 2024-03-30 PROCEDURE — 80053 COMPREHEN METABOLIC PANEL: CPT

## 2024-03-30 PROCEDURE — 63600175 PHARM REV CODE 636 W HCPCS

## 2024-03-30 PROCEDURE — 93010 ELECTROCARDIOGRAM REPORT: CPT | Mod: ,,, | Performed by: INTERNAL MEDICINE

## 2024-03-30 PROCEDURE — 93005 ELECTROCARDIOGRAM TRACING: CPT

## 2024-03-30 PROCEDURE — 99900035 HC TECH TIME PER 15 MIN (STAT)

## 2024-03-30 PROCEDURE — 82803 BLOOD GASES ANY COMBINATION: CPT

## 2024-03-30 PROCEDURE — 85730 THROMBOPLASTIN TIME PARTIAL: CPT | Performed by: STUDENT IN AN ORGANIZED HEALTH CARE EDUCATION/TRAINING PROGRAM

## 2024-03-30 PROCEDURE — 85025 COMPLETE CBC W/AUTO DIFF WBC: CPT

## 2024-03-30 PROCEDURE — 20600001 HC STEP DOWN PRIVATE ROOM

## 2024-03-30 PROCEDURE — 27000207 HC ISOLATION

## 2024-03-30 PROCEDURE — 83735 ASSAY OF MAGNESIUM: CPT

## 2024-03-30 PROCEDURE — A4216 STERILE WATER/SALINE, 10 ML: HCPCS

## 2024-03-30 PROCEDURE — 94761 N-INVAS EAR/PLS OXIMETRY MLT: CPT | Mod: XB

## 2024-03-30 PROCEDURE — 83880 ASSAY OF NATRIURETIC PEPTIDE: CPT

## 2024-03-30 PROCEDURE — 99232 SBSQ HOSP IP/OBS MODERATE 35: CPT | Mod: ,,,

## 2024-03-30 RX ORDER — FUROSEMIDE 10 MG/ML
20 INJECTION INTRAMUSCULAR; INTRAVENOUS ONCE
Status: COMPLETED | OUTPATIENT
Start: 2024-03-30 | End: 2024-03-30

## 2024-03-30 RX ADMIN — INSULIN ASPART 4 UNITS: 100 INJECTION, SOLUTION INTRAVENOUS; SUBCUTANEOUS at 05:03

## 2024-03-30 RX ADMIN — HYDROMORPHONE HYDROCHLORIDE 0.5 MG: 1 INJECTION, SOLUTION INTRAMUSCULAR; INTRAVENOUS; SUBCUTANEOUS at 05:03

## 2024-03-30 RX ADMIN — CLOPIDOGREL BISULFATE 75 MG: 75 TABLET ORAL at 08:03

## 2024-03-30 RX ADMIN — INSULIN DETEMIR 15 UNITS: 100 INJECTION, SOLUTION SUBCUTANEOUS at 09:03

## 2024-03-30 RX ADMIN — INSULIN ASPART 4 UNITS: 100 INJECTION, SOLUTION INTRAVENOUS; SUBCUTANEOUS at 01:03

## 2024-03-30 RX ADMIN — HEPARIN SODIUM AND DEXTROSE 14 UNITS/KG/HR: 10000; 5 INJECTION INTRAVENOUS at 03:03

## 2024-03-30 RX ADMIN — Medication 10 ML: at 11:03

## 2024-03-30 RX ADMIN — PANTOPRAZOLE SODIUM 40 MG: 40 TABLET, DELAYED RELEASE ORAL at 08:03

## 2024-03-30 RX ADMIN — COLLAGENASE SANTYL: 250 OINTMENT TOPICAL at 08:03

## 2024-03-30 RX ADMIN — Medication 10 ML: at 05:03

## 2024-03-30 RX ADMIN — Medication 10 ML: at 12:03

## 2024-03-30 RX ADMIN — GABAPENTIN 300 MG: 300 CAPSULE ORAL at 08:03

## 2024-03-30 RX ADMIN — INSULIN DETEMIR 15 UNITS: 100 INJECTION, SOLUTION SUBCUTANEOUS at 08:03

## 2024-03-30 RX ADMIN — APIXABAN 5 MG: 5 TABLET, FILM COATED ORAL at 09:03

## 2024-03-30 RX ADMIN — ATORVASTATIN CALCIUM 40 MG: 40 TABLET, FILM COATED ORAL at 08:03

## 2024-03-30 RX ADMIN — INSULIN ASPART 4 UNITS: 100 INJECTION, SOLUTION INTRAVENOUS; SUBCUTANEOUS at 09:03

## 2024-03-30 RX ADMIN — TRAZODONE HYDROCHLORIDE 25 MG: 50 TABLET ORAL at 09:03

## 2024-03-30 RX ADMIN — GABAPENTIN 300 MG: 300 CAPSULE ORAL at 09:03

## 2024-03-30 RX ADMIN — FUROSEMIDE 20 MG: 10 INJECTION, SOLUTION INTRAMUSCULAR; INTRAVENOUS at 04:03

## 2024-03-30 RX ADMIN — NIFEDIPINE 90 MG: 30 TABLET, FILM COATED, EXTENDED RELEASE ORAL at 08:03

## 2024-03-30 RX ADMIN — INSULIN ASPART 2 UNITS: 100 INJECTION, SOLUTION INTRAVENOUS; SUBCUTANEOUS at 02:03

## 2024-03-30 RX ADMIN — TAMSULOSIN HYDROCHLORIDE 0.4 MG: 0.4 CAPSULE ORAL at 08:03

## 2024-03-30 RX ADMIN — HYDROMORPHONE HYDROCHLORIDE 0.5 MG: 1 INJECTION, SOLUTION INTRAMUSCULAR; INTRAVENOUS; SUBCUTANEOUS at 02:03

## 2024-03-30 NOTE — PLAN OF CARE
SW sent updated clinicals information to [facility] via [employer] system.       03/30/24 0383   Post-Acute Status   Post-Acute Authorization Placement   Post-Acute Placement Status Pending medical clearance/testing

## 2024-03-30 NOTE — ASSESSMENT & PLAN NOTE
BG goal: 140-180    - Levemir (Insulin Detemir) 15 units BID (0.5 u/kg/day)   - Novolog (Insulin Aspart) 4-8 units with meals (Administer    4    units if patient eats 25-50% of meal, administer    8    units if patient eats > 50% of meal.) and prn for BG excursions [facility] [place] [place] SSI (150/25)  - BG checks /HS/0200   - Hypoglycemia protocol in place     ** Please notify Endocrine for any change and/or advance in diet**  ** Please call Endocrine for any BG related issues **     Discharge Planning:   TBD. Please notify endocrinology prior to discharge.

## 2024-03-30 NOTE — ASSESSMENT & PLAN NOTE
Patient has swelling and pain in the right arm and some swelling in the left arm as well    -US b/l upper extremity for DVT showed clot  - transitioned from heparin to eliquis 3/30

## 2024-03-30 NOTE — ASSESSMENT & PLAN NOTE
- eliquis transitioned to Heparin which was paused temporarily d/t concern of new hematuria which developed 3/25. Discussed with urology, the hematuria resolved with repositioning of the fung. Heparin resumed, but will temporarily be paused as patient needs preoperative blood transfusion and has minimal IV access d/t placement difficulties & limb alert on R-arm d/t DVT    - resumed eliquis 3/30

## 2024-03-30 NOTE — ASSESSMENT & PLAN NOTE
"Source control achieved with proximal amputation. Per ID can discontinue antimicrobials 24-48 hours after surgery as long term treatment is not indicated    This patient does have evidence of infective focus  My overall impression is sepsis.  Source: Skin and Soft Tissue (location left leg)  Antibiotics given-   Antibiotics (72h ago, onward)    None        Latest lactate reviewed-  No results for input(s): "LACTATE", "POCLAC" in the last 72 hours.    Organ dysfunction indicated by Acute kidney injury    Fluid challenge Actual Body weight- Patient will receive 30ml/kg actual body weight to calculate fluid bolus for treatment of septic shock.     Post- resuscitation assessment No - Post resuscitation assessment not needed       Will Not start Pressors-     - See osteo    "

## 2024-03-30 NOTE — PROGRESS NOTES
[facility] - Telemetry Stepdown  Vascular Surgery  Progress Note    Patient Name: Saji Castañeda  MRN: [id number]  Admission Date: 3/5/2024  Primary Care Provider: [facility] -    Subjective:     Interval History: NAEO. Drain benign. Incision looks good. Stump protector in place.     Post-Op Info:  Procedure(s) (LRB):  AMPUTATION, ABOVE KNEE (Left)   3 Days Post-Op     Medications:  Continuous Infusions:   heparin (porcine) in D5W 14 Units/kg/hr (03/30/24 0840)     Scheduled Meds:   atorvastatin  40 mg Oral Daily    clopidogreL  75 mg Oral Daily    collagenase   Topical (Top) Daily    gabapentin  300 mg Oral BID    insulin aspart U-100  4-8 Units Subcutaneous TIDWM    insulin detemir U-100  15 Units Subcutaneous BID    NIFEdipine  90 mg Oral Daily    pantoprazole  40 mg Oral Daily    sodium chloride 0.9%  10 mL Intravenous Q6H    tamsulosin  0.4 mg Oral Daily    traZODone  25 mg Oral QHS     PRN Meds:acetaminophen, aluminum-magnesium hydroxide-simethicone, dextrose 10%, dextrose 10%, dextrose 10%, dextrose 10%, glucagon (human recombinant), glucose, glucose, heparin (PORCINE), heparin (PORCINE), HYDROmorphone, insulin aspart U-100, melatonin, ondansetron, oxyCODONE, sodium chloride 0.9%, Flushing PICC/Midline Protocol **AND** sodium chloride 0.9% **AND** sodium chloride 0.9%, triamcinolone acetonide 0.025%     Objective:     Vital Signs (Most Recent):  Temp: 98.8 °F (37.1 °C) (03/30/24 0803)  Pulse: 86 (03/30/24 0803)  Resp: 16 (03/30/24 0803)  BP: 133/61 (03/30/24 0803)  SpO2: (!) 91 % (03/30/24 0803) Vital Signs (24h Range):  Temp:  [98.2 °F (36.8 °C)-99.3 °F (37.4 °C)] 98.8 °F (37.1 °C)  Pulse:  [63-86] 86  Resp:  [16-20] 16  SpO2:  [91 %-98 %] 91 %  BP: (114-136)/(56-63) 133/61          Physical Exam  Vitals reviewed.   Constitutional:       General: He is not in acute distress.     Appearance: He is ill-appearing.   HENT:      Head: Normocephalic and atraumatic.      Nose: Nose normal.   Eyes:       General:         Right eye: No discharge.         Left eye: No discharge.   Cardiovascular:      Rate and Rhythm: Normal rate and regular rhythm.   Pulmonary:      Effort: Pulmonary effort is normal. No respiratory distress.      Breath sounds: No stridor.   Musculoskeletal:         General: Normal range of motion.      Cervical back: Normal range of motion.      Comments: S/p LLE amputation. Drain benign.  Site looks good with stump protector in place.    Skin:     Capillary Refill: Capillary refill takes 2 to 3 seconds.   Neurological:      General: No focal deficit present.   Psychiatric:         Mood and Affect: Mood normal.         Behavior: Behavior normal.          Significant Labs:  All pertinent labs from the last 24 hours have been reviewed.    Significant Diagnostics:  I have reviewed all pertinent imaging results/findings within the past 24 hours.  Assessment/Plan:     * Osteomyelitis of left lower extremity  Vascular Surgery re-consulted for to discuss AKA due to LLE calcaneus OM. Patient febrile today and WBC 11.67. Xray L foot shows acute OM to calcaneus. Patient was previously not amendable to AKA but has now agreed to continue with amputation. S/p L AKA 3/27    - S/p L AKA w/ EDMUNDO drain intact. Incision c/d/i8  - Dressing changed this morning and drain removed.   - Stump protector ordered and applied. Daily dressing changes with abd pad. Please contact if issues with wound.   - Okay for plavix and oral anticoagulation   - Recommend BG <180 to lessen risk of infection of amputation site leading to more proximal amputation or death  - Recommend PT/OT  - Vascular surgery will continue to follow  -Needs follow-up in 4 weeks. We will sign off.                Will Davis MD  Vascular Surgery  [facility] - Telemetry Stepdown

## 2024-03-30 NOTE — ASSESSMENT & PLAN NOTE
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 Patient febrile today and WBC 11.67. Xray L foot shows acute OM to calcaneus. Patient was previously not amendable to AKA but has now agreed to continue with amputation. S/p L AKA 3/27    - S/p L AKA w/ EDMUNDO drain intact. Incision c/d/i8  - Dressing changed this morning and drain removed.   - Stump protector ordered and applied. Daily dressing changes with abd pad. Please contact if issues with wound.   - Okay for plavix and oral anticoagulation   - Recommend BG <180 to lessen risk of infection of amputation site leading to more proximal amputation or death  - Recommend PT/OT  - Vascular surgery will continue to follow  -Needs follow-up in 4 weeks. We will sign off.

## 2024-03-30 NOTE — PLAN OF CARE
Pt AAOx3, c/o of pain on his left stump which very well controlled with current pain regimen. Stump protector in place, elevated on pillow. Heparin gtt infusing @ 14u/kg/hr or 12.7ml/hr. Sams in place CDI. VSS, no acute distress noted. POC on going.    Problem: Adult Inpatient Plan of Care  Goal: Plan of Care Review  Outcome: Ongoing, Progressing  Goal: Absence of Hospital-Acquired Illness or Injury  Outcome: Ongoing, Progressing  Goal: Optimal Comfort and Wellbeing  Outcome: Ongoing, Progressing  Goal: Readiness for Transition of Care  Outcome: Ongoing, Progressing     Problem: Diabetes Comorbidity  Goal: Blood Glucose Level Within Targeted Range  Outcome: Ongoing, Progressing     Problem: Infection  Goal: Absence of Infection Signs and Symptoms  Outcome: Ongoing, Progressing     Problem: Impaired Wound Healing  Goal: Optimal Wound Healing  Outcome: Ongoing, Progressing     Problem: Skin Injury Risk Increased  Goal: Skin Health and Integrity  Outcome: Ongoing, Progressing     Problem: Fall Injury Risk  Goal: Absence of Fall and Fall-Related Injury  Outcome: Ongoing, Progressing

## 2024-03-30 NOTE — ASSESSMENT & PLAN NOTE
Insulin mgmt per endocrine    Patient's FSGs are uncontrolled due to hyperglycemia on current medication regimen.  Last A1c reviewed-   Lab Results   Component Value Date    HGBA1C >14.0 (H) 03/05/2024     Most recent fingerstick glucose reviewed-   Recent Labs   Lab 03/29/24  1525 03/29/24  2141 03/30/24  0222 03/30/24  0823   POCTGLUCOSE 232* 107 203* 194*       Current correctional scale  Low  Increase anti-hyperglycemic dose as follows-   Antihyperglycemics (From admission, onward)    Start     Stop Route Frequency Ordered    03/29/24 2346  insulin aspart U-100 pen 0-10 Units         -- SubQ Before meals, nightly and at 0200 PRN 03/29/24 2247    03/29/24 2100  insulin detemir U-100 (Levemir) pen 15 Units         -- SubQ 2 times daily 03/29/24 1415    03/29/24 1645  insulin aspart U-100 pen 4-8 Units         -- SubQ 3 times daily with meals 03/29/24 1413        Hold Oral hypoglycemics while patient is in the hospital.    - Endocrine's following an making insulin adjustments as needed.

## 2024-03-30 NOTE — PROGRESS NOTES
[facility] - Tele[facility] Stepdown  Endocrinology  Progress Note    Admit Date: 3/5/2024     Reason for Consult: Management of T2DM, Hyperglycemia     Diabetes diagnosis year: > 25 years    Home Diabetes Medications:  Novolog 6 units w/ meals and Lantus 45 units nightly (states he is not taking).     How often checking glucose at home?  Not really checking    BG readings on regimen: 200's  Hypoglycemia on the regimen?  Yes (When he was taking the Lantus)  Missed doses on regimen?  Yes    Diabetes Complications include:     Hyperglycemia, Hypoglycemia , Diabetic chronic kidney disease     , Diabetic dermatitis, Foot ulcer  , and Periodontal disease    Complicating diabetes co morbidities:   HTN; HLD      HPI: Saji Castañeda is a 75 yo M with a history of Afib, T2D, HLD, HTN, chronic osteomyelitis of L heel, s/p L TMA, PID, ESBL Klebsiella and Acinetobacter bacteremia who was admitted after being found down at home and DKA. Endocrine consulted to manage hyperglycemia and type 2 diabetes.   Hemoglobin A1C   Date Value Ref Range Status   03/05/2024 >14.0 (H) 4.0 - 5.6 % Final     Comment:     ADA Screening Guidelines:  5.7-6.4%  Consistent with prediabetes  >or=6.5%  Consistent with diabetes    High levels of fetal hemoglobin interfere with the HbA1C  assay. Heterozygous hemoglobin variants (HbS, HgC, etc)do  not significantly interfere with this assay.   However, presence of multiple variants may affect accuracy.     09/13/2023 11.0 (H) 4.5 - 5.7 % Final   03/10/2023 8.4 (H) 4.0 - 5.6 % Final     Comment:     ADA Screening Guidelines:  5.7-6.4%  Consistent with prediabetes  >or=6.5%  Consistent with diabetes    High levels of fetal hemoglobin interfere with the HbA1C  assay. Heterozygous hemoglobin variants (HbS, HgC, etc)do  not significantly interfere with this assay.   However, presence of multiple variants may affect accuracy.     09/17/2022 9.9 (H) 4.0 - 5.6 % Final     Comment:     ADA Screening Guidelines:  5.7-6.4%   "Consistent with prediabetes  >or=6.5%  Consistent with diabetes    High levels of fetal hemoglobin interfere with the HbA1C  assay. Heterozygous hemoglobin variants (HbS, HgC, etc)do  not significantly interfere with this assay.   However, presence of multiple variants may affect accuracy.              Interval HPI:   No acute events overnight. Patient in room 8078/8078 A. Blood glucose stable. BG above goal on current insulin regimen (SSI, prandial, and basal insulin ). Of note, patient reports not eating dinner last night- prandial insulin given at higher range causing BG below goal (107). Steroid use- None.   3 Days Post-Op  Renal function- Normal   Vasopressors-  None     Diet diabetic  Calorie     Eatin%  Nausea: No  Hypoglycemia and intervention: No  Fever: No  TPN and/or TF: No      /61 (BP Location: Left arm, Patient Position: Lying)   Pulse 86   Temp 98.8 °F (37.1 °C) (Oral)   Resp 16   Ht 5' 9" (1.753 m)   Wt 120.2 kg (264 lb 15.9 oz)   SpO2 (!) 91%   BMI 39.13 kg/m²     Labs Reviewed and Include    Recent Labs   Lab 24  0802   *   CALCIUM 8.3*   ALBUMIN 1.5*   PROT 6.4      K 4.3   CO2 24      BUN 35*   CREATININE 1.4   ALKPHOS 241*   ALT 15   AST 62*   BILITOT 0.2     Lab Results   Component Value Date    WBC 9.26 2024    HGB 7.1 (L) 2024    HCT 23.1 (L) 2024    MCV 82 2024     2024     No results for input(s): "TSH", "FREET4" in the last 168 hours.  Lab Results   Component Value Date    HGBA1C >14.0 (H) 2024       Nutritional status:   Body mass index is 39.13 kg/m².  Lab Results   Component Value Date    ALBUMIN 1.5 (L) 2024    ALBUMIN 1.6 (L) 2024    ALBUMIN 1.7 (L) 2024     Lab Results   Component Value Date    PREALBUMIN 17 (L) 2014    PREALBUMIN 16 (L) 2014    PREALBUMIN 19 (L) 2014       Estimated Creatinine Clearance: 58.4 mL/min (based on SCr of 1.4 " mg/dL).    Accu-Checks  Recent Labs     03/28/24  1214 03/28/24  1635 03/28/24  2116 03/29/24  0906 03/29/24  1152 03/29/24  1330 03/29/24  1525 03/29/24  2141 03/30/24  0222 03/30/24  0823   POCTGLUCOSE 209* 196* 212* 303* 449* 223* 232* 107 203* 194*       Current Medications and/or Treatments Impacting Glycemic Control  Immunotherapy:    Immunosuppressants       None          Steroids:   Hormones (From admission, onward)      Start     Stop Route Frequency Ordered    03/28/24 1128  melatonin tablet 6 mg         -- Oral Nightly PRN 03/28/24 1028          Pressors:    Autonomic Drugs (From admission, onward)      None          Hyperglycemia/Diabetes Medications:   Antihyperglycemics (From admission, onward)      Start     Stop Route Frequency Ordered    03/29/24 2346  insulin aspart U-100 pen 0-10 Units         -- SubQ Before meals, nightly and at 0200 PRN 03/29/24 2247    03/29/24 2100  insulin detemir U-100 (Levemir) pen 15 Units         -- SubQ 2 times daily 03/29/24 1415    03/29/24 1645  insulin aspart U-100 pen 4-8 Units         -- SubQ 3 times daily with meals 03/29/24 1413            ASSESSMENT and PLAN    Renal/  Stage 3b chronic kidney disease  Titrate insulin slowly to avoid hypoglycemia as the risk of hypoglycemia increases with decreased creatinine clearance.        ID  * Osteomyelitis of left lower extremity  Managed per primary team  Optimize BG control to improve wound healing        Oncology  Iron deficiency anemia      May affect accuracy of A1C     Endocrine  Insulin dependent type 2 diabetes mellitus  BG goal: 140-180    - Levemir (Insulin Detemir) 15 units BID (0.5 u/kg/day)   - Novolog (Insulin Aspart) 4-8 units with meals (Administer    4    units if patient eats 25-50% of meal, administer    8    units if patient eats > 50% of meal. Hold if NOT EATING) and prn for BG excursions MDC SSI (150/25)  - BG checks AC/HS/0200   - Hypoglycemia protocol in place     ** Please notify Endocrine for  any change and/or advance in diet**  ** Please call Endocrine for any BG related issues **     Discharge Planning:   TBD. Please notify endocrinology prior to discharge.        Missy Yadav PA-C  Endocrinology  [facility] - Telemetry Stepdown

## 2024-03-30 NOTE — PROGRESS NOTES
[facility] - Telemetry [facility]  Progress Note    Patient Name: Saji Castañeda  MRN: [id number]  Patient Class: IP- Inpatient   Admission Date: 3/5/2024  Length of Stay: 25 days  Attending Physician: Jm Rosa MD  Primary Care Provider: [facility] -        Subjective:     Principal Problem:Osteomyelitis of left lower extremity        HPI:  Saji Castañeda is a 74 y.o. male with a medical history of DM2, HLD, HTN, chronic osteomyelitis of L heel, L TMA, PAD, hx of ESBL klebsiella, acinetobacter bacteremia, presenting with hyperglycemia. He presented to the ED via EMS and his POCT glucose on arrival was >500. Serum blood glucose 667 with anion gap of 17 and elevated ketones. Serum potassium 3.4, corrected sodium 149. Urinalysis significant for RBCs, glucosuria, and moderate bacteria and yeast. CBC revealed leukocytosis, elevated platelets, and mild anemia. CMP shows Na 135, K 3.4, BUN 29, Cr 2.1, Glucose 677, Lactate 4.61. BNP and troponin elevated. GFR 32.2. Insulin drip, IV fluids, zosyn, vancomycin, and potassium given.      He is unable to provide much history, but states that he has not been taking his home medications for at least a week. He reports shortness of breath, fatigue, and weakness for the last 6-7 days. He has chills and reports episodes of emesis. He denies abdominal pain, chest pain, palpitations, recent illness/infection, fevers, changes to bowel movements and urinary output. Patient lives with his brother and sister at home. He was last seen in the ED on 2/21 after a fall. He was hypoglycemic BGL 60 at that time which returned to normal after eating. He was also scheduled for a wound clinic appointment today 3/5 at [facility] with Dr. Wilson.        Overview/Hospital Course:  Wound care and cultures collected by Podiatry with recs for Vascular consult for amputation. Patient declined amputation. ID recommending IV Ertapenem for chronic osteomyelitis for 6 week duration  (EED: 4/15/24). With high wound care needs and IV antibiotics plan patient needing long-term placement. He is being treated with topical permethrin for bedbugs. Wound care has been dressing the wounds. 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 Still adjusting insulin to control sugars better. Giving fluids for YNES. Poor PO intake even with feeding assistance. LTAC authorization denied by [employer]. Waiting for SNF placement ([facility], awaiting auth and family to complete paperwork). Patient started spiking fever again and infectious work up done. ID re-consulted. Patient has been counseled multiple times that source control cannot be achieved without amputation. Patient agreeable to AKA scheduled for 3/27. Transitioned to heparin from eliquis. Fung placed for urinary rentention. Discussed new hematuria with urology, hematuria resolved with repositioning of fung. Pt to receive 1u PRBC preoperatively per vascular surgery. S/p L AKA on 3/27 with vascular surgery. ID rec 24-48h post-op abx now that source control is achieved, unless further concerned for infection. Delirium precautions + trazodone added for sleep hygiene, rescheduled lab times appropriately. Resuming heparin drip + plavix 3/29 per vascular surg. 3/30 transitioned to eliquis from heparin gtt.      Interval History: Some hypoxia overnight, pt stable on 2-3L this morning. Likely volume overload in setting of CHF w/ diuretics held for YNES, investigations/mgmt pending      Objective:     Vital Signs (Most Recent):  Temp: 99 °F (37.2 °C) (03/30/24 1133)  Pulse: 64 (03/30/24 1138)  Resp: 20 (03/30/24 1133)  BP: 126/73 (03/30/24 1133)  SpO2: 96 % (03/30/24 1133) Vital Signs (24h Range):  Temp:  [98.2 °F (36.8 °C)-99 °F (37.2 °C)] 99 °F (37.2 °C)  Pulse:  [62-86] 64  Resp:  [16-20] 20  SpO2:  [91 %-98 %] 96 %  BP: (118-136)/(56-73) 126/73     Weight: 120.2 kg (264 lb 15.9 oz)  Body mass index is 39.13  kg/m².    Intake/Output Summary (Last 24 hours) at 3/30/2024 1346  Last data filed at 3/30/2024 0745  Gross per 24 hour   Intake 0 ml   Output 1335 ml   Net -1335 ml         Physical Exam  Constitutional:       General: He is not in acute distress.     Appearance: Normal appearance. He is well-developed. Ill appearance: chronically ill appearing.   HENT:      Head: Normocephalic and atraumatic.      Right Ear: External ear normal.      Left Ear: External ear normal.      Nose: Nose normal.   Eyes:      General:         Right eye: No discharge.         Left eye: No discharge.      Conjunctiva/sclera: Conjunctivae normal.   Cardiovascular:      Rate and Rhythm: Normal rate and regular rhythm.      Pulses: Normal pulses.   Pulmonary:      Effort: Pulmonary effort is normal. No respiratory distress.      Breath sounds: No stridor.   Abdominal:      General: Abdomen is flat.      Palpations: Abdomen is soft.      Tenderness: There is no abdominal tenderness.   Musculoskeletal:         General: Swelling and deformity present.      Cervical back: Normal range of motion.      Comments: S/p L-AKA, bandage CDI  RUE swelling d/t DVT   Skin:     General: Skin is warm and dry.   Neurological:      General: No focal deficit present.      Mental Status: He is alert.             Significant Labs: All pertinent labs within the past 24 hours have been reviewed.    Significant Imaging: I have reviewed all pertinent imaging results/findings within the past 24 hours.    Assessment/Plan:      * Osteomyelitis of left lower extremity  Resolved w/ source control, d/c abx today    Patient with Proteus and enterobacter on leg cultures collected by Podiatry with concern for acute on chronic osteo. Fungal culture grew fusarium. He has started spiking fever and still is not agreeable to amputation, will do infectious work up to look out for source.    -UA noninfectious   -Blood culture NGTD  - Added voriconazole for fusarium coverage which grew  on fungal culture - 3 months  - midline catheters placed on admission for difficult IV access  - Per ID consult note 3/28: Source control achieved with proximal amputation. Can discontinue antimicrobials 24-48 hours after surgery as long term treatment is not indicated.       Urinary retention  Patient developed new urinary retention.     -Fung's placed   -Flomax  -Renal ultrasound showed no hydronephrosis  - Making adequate urine with fung, c/f hematuria which developed 3/25 and resolved with repositioning of fung. See urology note from same date  - voiding trial prior to DC. If unable to void, urology f/u outpatient       BPH (benign prostatic hyperplasia)  Fung in placed, placed by urology  Flomax daily      Localized swelling of right upper extremity  Patient has swelling and pain in the right arm and some swelling in the left arm as well    -US b/l upper extremity for DVT showed clot  - transitioned from heparin to eliquis 3/30    History of Bedbug bite with infection  Patient started on Permethrin daily for 5 days starting 3/12      Proteus infection  Abx d/c'ed 3/30    Was on ertapenem to cover proteus/enterobacter growing from leg wound; source control achieved s/p L AKA    Chronic anticoagulation  - See DVT      History of amputation of left high mid foot   See osteo      Stage 3b chronic kidney disease  Creatine stable for now. BMP reviewed- noted Estimated Creatinine Clearance: 58.4 mL/min (based on SCr of 1.4 mg/dL). according to latest data. Based on current GFR, CKD stage is stage 3 - GFR 30-59.  Monitor UOP and serial BMP and adjust therapy as needed. Renally dose meds. Avoid nephrotoxic medications and procedures.    Severe sepsis  Source control achieved with proximal amputation. Per ID can discontinue antimicrobials 24-48 hours after surgery as long term treatment is not indicated    This patient does have evidence of infective focus  My overall impression is sepsis.  Source: Skin and Soft  "Tissue (location left leg)  Antibiotics given-   Antibiotics (72h ago, onward)      None          Latest lactate reviewed-  No results for input(s): "LACTATE", "POCLAC" in the last 72 hours.    Organ dysfunction indicated by Acute kidney injury    Fluid challenge Actual Body weight- Patient will receive 30ml/kg actual body weight to calculate fluid bolus for treatment of septic shock.     Post- resuscitation assessment No - Post resuscitation assessment not needed       Will Not start Pressors-     - See osteo      Other hyperlipidemia  Continue home atorvastatin      Diabetic ketoacidosis without coma associated with type 2 diabetes mellitus  Resolved    Patient's FSGs are uncontrolled due to hyperglycemia on current medication regimen.  Last A1c reviewed-   Lab Results   Component Value Date    HGBA1C >14.0 (H) 03/05/2024     Most recent fingerstick glucose reviewed-   Recent Labs   Lab 03/29/24  1525 03/29/24  2141 03/30/24  0222 03/30/24  0823   POCTGLUCOSE 232* 107 203* 194*       Current correctional scale   LD SSI  Maintain anti-hyperglycemic dose as follows-   Antihyperglycemics (From admission, onward)      Start     Stop Route Frequency Ordered    03/29/24 2346  insulin aspart U-100 pen 0-10 Units         -- SubQ Before meals, nightly and at 0200 PRN 03/29/24 2247    03/29/24 2100  insulin detemir U-100 (Levemir) pen 15 Units         -- SubQ 2 times daily 03/29/24 1415    03/29/24 1645  insulin aspart U-100 pen 4-8 Units         -- SubQ 3 times daily with meals 03/29/24 1413          Hold Oral hypoglycemics while patient is in the hospital.  Will keep patient on DKA pathway with insulin drip and fluid with protocol in place for switching to subcutaneous insulin as anion gap closes.      DKA    - Resolved  - Continue insulin regimen and BG checks TIDWM and QHS and 2am  - patient tends to become hypoglycemic around dinnertime  - could be insulin stacking from breakfast and lunch insulin aspart given poor PO " intake despite assistance with feeding   - detemir 14 unit BID  - aspart scheduled w/ only breakfast and lunch and dinner with different units  -Inpatient consult to Endocrine    Paroxysmal atrial fibrillation  Patient with Persistent (7 days or more) atrial fibrillation which is uncontrolled currently with    . Patient is currently in sinus rhythm.[id number] Score: 4. . Anticoagulation indicated. Anticoagulation done with heparin/eliquis .    - ac with eliquis resumed 3/30    Chronic deep vein thrombosis (DVT) of right upper extremity  - eliquis transitioned to Heparin which was paused temporarily d/t concern of new hematuria which developed 3/25. Discussed with urology, the hematuria resolved with repositioning of the fung. Heparin resumed, but will temporarily be paused as patient needs preoperative blood transfusion and has minimal IV access d/t placement difficulties & limb alert on R-arm d/t DVT    - resumed eliquis 3/30       Iron deficiency anemia  Patient's anemia is currently uncontrolled. Has received 2 units of PRBCs on 3/23 and 3/16 . Etiology likely d/t chronic blood loss and Iron deficiency. Receiving 1u on 3/26 preoperatively    Current CBC reviewed-   Lab Results   Component Value Date    HGB 7.1 (L) 03/30/2024    HCT 23.1 (L) 03/30/2024     Monitor serial CBC and transfuse if patient becomes hemodynamically unstable, symptomatic or H/H drops below 7/21.    Cellulitis of left lower leg  Resolved w/ source control, see osteo      Peripheral arterial disease  Resumed plavix 3/29      YNES (acute kidney injury)  Resolving    Patient with acute kidney injury/acute renal failure likely due to pre-renal azotemia due to dehydration and post-obstructive d/t urinary retention  YNES is currently stable. Baseline creatinine  1.1  - Labs reviewed- Renal function/electrolytes with Estimated Creatinine Clearance: 58.4 mL/min (based on SCr of 1.4 mg/dL). according to latest data. Monitor urine output and serial BMP  and adjust therapy as needed. Avoid nephrotoxins and renally dose meds for GFR listed above.    Creatinine 2.1 on admit, baseline around 1.1  - prerenal vs obstructive (fung placed)     Lab Results   Component Value Date    CREATININE 1.4 03/30/2024       Plan:   - Strict I&Os and daily weights   - Daily BMP  - Trend sCr  - Avoid nephrotoxic agents such as NSAIDs, ACEi, ARBs and IV radiocontrast.  - Renally dose meds to current GFR.  - Maintain MAP > 65.  - No indications for HD at this time.   - Maintain electrolytes at goal: Mg > 2, Phos > 3, K > 4.           Insulin dependent type 2 diabetes mellitus  Insulin mgmt per endocrine    Patient's FSGs are uncontrolled due to hyperglycemia on current medication regimen.  Last A1c reviewed-   Lab Results   Component Value Date    HGBA1C >14.0 (H) 03/05/2024     Most recent fingerstick glucose reviewed-   Recent Labs   Lab 03/29/24  1525 03/29/24  2141 03/30/24  0222 03/30/24  0823   POCTGLUCOSE 232* 107 203* 194*       Current correctional scale  Low  Increase anti-hyperglycemic dose as follows-   Antihyperglycemics (From admission, onward)      Start     Stop Route Frequency Ordered    03/29/24 2346  insulin aspart U-100 pen 0-10 Units         -- SubQ Before meals, nightly and at 0200 PRN 03/29/24 2247    03/29/24 2100  insulin detemir U-100 (Levemir) pen 15 Units         -- SubQ 2 times daily 03/29/24 1415    03/29/24 1645  insulin aspart U-100 pen 4-8 Units         -- SubQ 3 times daily with meals 03/29/24 1413          Hold Oral hypoglycemics while patient is in the hospital.    - Endocrine's following an making insulin adjustments as needed.    Essential hypertension  Chronic, uncontrolled. Latest blood pressure and vitals reviewed-     Temp:  [98.2 °F (36.8 °C)-99 °F (37.2 °C)]   Pulse:  [62-86]   Resp:  [16-20]   BP: (118-136)/(56-73)   SpO2:  [91 %-98 %] .   Home meds for hypertension were reviewed and noted below.   Hypertension Medications                furosemide (LASIX) 40 MG tablet Take 20 mg by mouth.    hydrALAZINE (APRESOLINE) 100 MG tablet Take 1 tablet (100 mg total) by mouth every 8 (eight) hours.    isosorbide dinitrate (ISORDIL) 10 MG tablet Take 1 tablet (10 mg total) by mouth 3 (three) times daily.    NIFEdipine (PROCARDIA-XL) 60 MG (OSM) 24 hr tablet Take 1 tablet (60 mg total) by mouth once daily.            While in the hospital, will manage blood pressure as follows; Adjust home antihypertensive regimen as follows- will keep nifedipine - increased to 90     Will utilize p.r.n. blood pressure medication only if patient's blood pressure greater than 180/110 and he develops symptoms such as worsening chest pain or shortness of breath.      VTE Risk Mitigation (From admission, onward)           Ordered     apixaban tablet 5 mg  2 times daily         03/30/24 1346     heparin 25,000 units in dextrose 5% 250 mL (100 units/mL) infusion HIGH INTENSITY nomogram - [facility]  Continuous        Question:  Begin at (units/kg/hr)  Answer:  18    03/20/24 0747                    Discharge Planning   OXANA: 4/1/2024     Code Status: Full Code   Is the patient medically ready for discharge?: No    Reason for patient still in hospital (select all that apply): Patient trending condition  Discharge Plan A: Rehab (TBD)   Discharge Delays: None known at this time              Jori Byrd MD  Department of Hospital Medicine   [facility] - Telemetry Stepdown

## 2024-03-30 NOTE — SUBJECTIVE & OBJECTIVE
"Interval HPI:   No acute events overnight. Patient in room 8078/8078 A. Blood glucose stable. BG above goal on current insulin regimen (SSI, prandial, and basal insulin ). Steroid use- None.   3 Days Post-Op  Renal function- Normal   Vasopressors-  None     Diet diabetic  Calorie     Eatin%  Nausea: No  Hypoglycemia and intervention: No  Fever: No  TPN and/or TF: No      /61 (BP Location: Left arm, Patient Position: Lying)   Pulse 86   Temp 98.8 °F (37.1 °C) (Oral)   Resp 16   Ht 5' 9" (1.753 m)   Wt 120.2 kg (264 lb 15.9 oz)   SpO2 (!) 91%   BMI 39.13 kg/m²     Labs Reviewed and Include    Recent Labs   Lab 24  0802   *   CALCIUM 8.3*   ALBUMIN 1.5*   PROT 6.4      K 4.3   CO2 24      BUN 35*   CREATININE 1.4   ALKPHOS 241*   ALT 15   AST 62*   BILITOT 0.2     Lab Results   Component Value Date    WBC 9.26 2024    HGB 7.1 (L) 2024    HCT 23.1 (L) 2024    MCV 82 2024     2024     No results for input(s): "TSH", "FREET4" in the last 168 hours.  Lab Results   Component Value Date    HGBA1C >14.0 (H) 2024       Nutritional status:   Body mass index is 39.13 kg/m².  Lab Results   Component Value Date    ALBUMIN 1.5 (L) 2024    ALBUMIN 1.6 (L) 2024    ALBUMIN 1.7 (L) 2024     Lab Results   Component Value Date    PREALBUMIN 17 (L) 2014    PREALBUMIN 16 (L) 2014    PREALBUMIN 19 (L) 2014       Estimated Creatinine Clearance: 58.4 mL/min (based on SCr of 1.4 mg/dL).    Accu-Checks  Recent Labs     24  1214 24  1635 24  2116 24  0906 24  1152 24  1330 24  1525 24  2141 24  0222 24  0823   POCTGLUCOSE 209* 196* 212* 303* 449* 223* 232* 107 203* 194*       Current Medications and/or Treatments Impacting Glycemic Control  Immunotherapy:    Immunosuppressants       None          Steroids:   Hormones (From admission, onward)      Start     Stop " Route Frequency Ordered    03/28/24 1128  melatonin tablet 6 mg         -- Oral Nightly PRN 03/28/24 1028          Pressors:    Autonomic Drugs (From admission, onward)      None          Hyperglycemia/Diabetes Medications:   Antihyperglycemics (From admission, onward)      Start     Stop Route Frequency Ordered    03/29/24 2346  insulin aspart U-100 pen 0-10 Units         -- SubQ Before meals, nightly and at 0200 PRN 03/29/24 2247    03/29/24 2100  insulin detemir U-100 (Levemir) pen 15 Units         -- SubQ 2 times daily 03/29/24 1415    03/29/24 1645  insulin aspart U-100 pen 4-8 Units         -- SubQ 3 times daily with meals 03/29/24 1413

## 2024-03-30 NOTE — SUBJECTIVE & OBJECTIVE
Medications:  Continuous Infusions:   heparin (porcine) in D5W 14 Units/kg/hr (03/30/24 0840)     Scheduled Meds:   atorvastatin  40 mg Oral Daily    clopidogreL  75 mg Oral Daily    collagenase   Topical (Top) Daily    gabapentin  300 mg Oral BID    insulin aspart U-100  4-8 Units Subcutaneous TIDWM    insulin detemir U-100  15 Units Subcutaneous BID    NIFEdipine  90 mg Oral Daily    pantoprazole  40 mg Oral Daily    sodium chloride 0.9%  10 mL Intravenous Q6H    tamsulosin  0.4 mg Oral Daily    traZODone  25 mg Oral QHS     PRN Meds:acetaminophen, aluminum-magnesium hydroxide-simethicone, dextrose 10%, dextrose 10%, dextrose 10%, dextrose 10%, glucagon (human recombinant), glucose, glucose, heparin (PORCINE), heparin (PORCINE), HYDROmorphone, insulin aspart U-100, melatonin, ondansetron, oxyCODONE, sodium chloride 0.9%, Flushing PICC/Midline Protocol **AND** sodium chloride 0.9% **AND** sodium chloride 0.9%, triamcinolone acetonide 0.025%     Objective:     Vital Signs (Most Recent):  Temp: 98.8 °F (37.1 °C) (03/30/24 0803)  Pulse: 86 (03/30/24 0803)  Resp: 16 (03/30/24 0803)  BP: 133/61 (03/30/24 0803)  SpO2: (!) 91 % (03/30/24 0803) Vital Signs (24h Range):  Temp:  [98.2 °F (36.8 °C)-99.3 °F (37.4 °C)] 98.8 °F (37.1 °C)  Pulse:  [63-86] 86  Resp:  [16-20] 16  SpO2:  [91 %-98 %] 91 %  BP: (114-136)/(56-63) 133/61          Physical Exam  Vitals reviewed.   Constitutional:       General: He is not in acute distress.     Appearance: He is ill-appearing.   HENT:      Head: Normocephalic and atraumatic.      Nose: Nose normal.   Eyes:      General:         Right eye: No discharge.         Left eye: No discharge.   Cardiovascular:      Rate and Rhythm: Normal rate and regular rhythm.   Pulmonary:      Effort: Pulmonary effort is normal. No respiratory distress.      Breath sounds: No stridor.   Musculoskeletal:         General: Normal range of motion.      Cervical back: Normal range of motion.      Comments: S/p  LLE amputation. Drain benign.  Site looks good with stump protector in place.    Skin:     Capillary Refill: Capillary refill takes 2 to 3 seconds.   Neurological:      General: No focal deficit present.   Psychiatric:         Mood and Affect: Mood normal.         Behavior: Behavior normal.          Significant Labs:  All pertinent labs from the last 24 hours have been reviewed.    Significant Diagnostics:  I have reviewed all pertinent imaging results/findings within the past 24 hours.

## 2024-03-30 NOTE — NURSING
Dr Rome Fam notified regarding APTT level of 98. Heparin gtt stopped for 2 hours and will be reduced by 4u/kg/hr when it restarted in 2 hours per nomogram. No sign of bleeding noted.

## 2024-03-30 NOTE — ASSESSMENT & PLAN NOTE
Resolved    Patient's FSGs are uncontrolled due to hyperglycemia on current medication regimen.  Last A1c reviewed-   Lab Results   Component Value Date    HGBA1C >14.0 (H) 03/05/2024     Most recent fingerstick glucose reviewed-   Recent Labs   Lab 03/29/24  1525 03/29/24  2141 03/30/24  0222 03/30/24  0823   POCTGLUCOSE 232* 107 203* 194*       Current correctional scale   LD SSI  Maintain anti-hyperglycemic dose as follows-   Antihyperglycemics (From admission, onward)    Start     Stop Route Frequency Ordered    03/29/24 2346  insulin aspart U-100 pen 0-10 Units         -- SubQ Before meals, nightly and at 0200 PRN 03/29/24 2247    03/29/24 2100  insulin detemir U-100 (Levemir) pen 15 Units         -- SubQ 2 times daily 03/29/24 1415    03/29/24 1645  insulin aspart U-100 pen 4-8 Units         -- SubQ 3 times daily with meals 03/29/24 1413        Hold Oral hypoglycemics while patient is in the hospital.  Will keep patient on DKA pathway with insulin drip and fluid with protocol in place for switching to subcutaneous insulin as anion gap closes.      DKA    - Resolved  - Continue insulin regimen and BG checks TIDWM and QHS and 2am  - patient tends to become hypoglycemic around dinnertime  - could be insulin stacking from breakfast and lunch insulin aspart given poor PO intake despite assistance with feeding   - detemir 14 unit BID  - aspart scheduled w/ only breakfast and lunch and dinner with different units  -Inpatient consult to Endocrine

## 2024-03-30 NOTE — SUBJECTIVE & OBJECTIVE
Interval History: Some hypoxia overnight, pt stable on 2-3L this morning. Likely volume overload in setting of CHF w/ diuretics held for YNES, investigations/mgmt pending      Objective:     Vital Signs (Most Recent):  Temp: 99 °F (37.2 °C) (03/30/24 1133)  Pulse: 64 (03/30/24 1138)  Resp: 20 (03/30/24 1133)  BP: 126/73 (03/30/24 1133)  SpO2: 96 % (03/30/24 1133) Vital Signs (24h Range):  Temp:  [98.2 °F (36.8 °C)-99 °F (37.2 °C)] 99 °F (37.2 °C)  Pulse:  [62-86] 64  Resp:  [16-20] 20  SpO2:  [91 %-98 %] 96 %  BP: (118-136)/(56-73) 126/73     Weight: 120.2 kg (264 lb 15.9 oz)  Body mass index is 39.13 kg/m².    Intake/Output Summary (Last 24 hours) at 3/30/2024 1346  Last data filed at 3/30/2024 0745  Gross per 24 hour   Intake 0 ml   Output 1335 ml   Net -1335 ml         Physical Exam  Constitutional:       General: He is not in acute distress.     Appearance: Normal appearance. He is well-developed. Ill appearance: chronically ill appearing.   HENT:      Head: Normocephalic and atraumatic.      Right Ear: External ear normal.      Left Ear: External ear normal.      Nose: Nose normal.   Eyes:      General:         Right eye: No discharge.         Left eye: No discharge.      Conjunctiva/sclera: Conjunctivae normal.   Cardiovascular:      Rate and Rhythm: Normal rate and regular rhythm.      Pulses: Normal pulses.   Pulmonary:      Effort: Pulmonary effort is normal. No respiratory distress.      Breath sounds: No stridor.   Abdominal:      General: Abdomen is flat.      Palpations: Abdomen is soft.      Tenderness: There is no abdominal tenderness.   Musculoskeletal:         General: Swelling and deformity present.      Cervical back: Normal range of motion.      Comments: S/p L-AKA, bandage CDI  RUE swelling d/t DVT   Skin:     General: Skin is warm and dry.   Neurological:      General: No focal deficit present.      Mental Status: He is alert.             Significant Labs: All pertinent labs within the past 24  hours have been reviewed.    Significant Imaging: I have reviewed all pertinent imaging results/findings within the past 24 hours.

## 2024-03-30 NOTE — ASSESSMENT & PLAN NOTE
Patient's anemia is currently uncontrolled. Has received 2 units of PRBCs on 3/23 and 3/16 . Etiology likely d/t chronic blood loss and Iron deficiency. Receiving 1u on 3/26 preoperatively    Current CBC reviewed-   Lab Results   Component Value Date    HGB 7.1 (L) 03/30/2024    HCT 23.1 (L) 03/30/2024     Monitor serial CBC and transfuse if patient becomes hemodynamically unstable, symptomatic or H/H drops below 7/21.

## 2024-03-30 NOTE — ASSESSMENT & PLAN NOTE
Resolving    Patient with acute kidney injury/acute renal failure likely due to pre-renal azotemia due to dehydration and post-obstructive d/t urinary retention  YNES is currently stable. Baseline creatinine  1.1  - Labs reviewed- Renal function/electrolytes with Estimated Creatinine Clearance: 58.4 mL/min (based on SCr of 1.4 mg/dL). according to latest data. Monitor urine output and serial BMP and adjust therapy as needed. Avoid nephrotoxins and renally dose meds for GFR listed above.    Creatinine 2.1 on admit, baseline around 1.1  - prerenal vs obstructive (fung placed)     Lab Results   Component Value Date    CREATININE 1.4 03/30/2024       Plan:   - Strict I&Os and daily weights   - Daily BMP  - Trend sCr  - Avoid nephrotoxic agents such as NSAIDs, ACEi, ARBs and IV radiocontrast.  - Renally dose meds to current GFR.  - Maintain MAP > 65.  - No indications for HD at this time.   - Maintain electrolytes at goal: Mg > 2, Phos > 3, K > 4.

## 2024-03-30 NOTE — ASSESSMENT & PLAN NOTE
Chronic, uncontrolled. Latest blood pressure and vitals reviewed-     Temp:  [98.2 °F (36.8 °C)-99 °F (37.2 °C)]   Pulse:  [62-86]   Resp:  [16-20]   BP: (118-136)/(56-73)   SpO2:  [91 %-98 %] .   Home meds for hypertension were reviewed and noted below.   Hypertension Medications               furosemide (LASIX) 40 MG tablet Take 20 mg by mouth.    hydrALAZINE (APRESOLINE) 100 MG tablet Take 1 tablet (100 mg total) by mouth every 8 (eight) hours.    isosorbide dinitrate (ISORDIL) 10 MG tablet Take 1 tablet (10 mg total) by mouth 3 (three) times daily.    NIFEdipine (PROCARDIA-XL) 60 MG (OSM) 24 hr tablet Take 1 tablet (60 mg total) by mouth once daily.            While in the hospital, will manage blood pressure as follows; Adjust home antihypertensive regimen as follows- will keep nifedipine - increased to 90     Will utilize p.r.n. blood pressure medication only if patient's blood pressure greater than 180/110 and he develops symptoms such as worsening chest pain or shortness of breath.

## 2024-03-30 NOTE — ASSESSMENT & PLAN NOTE
Casimiro d/c'ed 3/30    Was on ertapenem to cover proteus/enterobacter growing from leg wound; source control achieved s/p L AKA

## 2024-03-30 NOTE — PLAN OF CARE
Problem: Adult Inpatient Plan of Care  Goal: Plan of Care Review  Outcome: Ongoing, Progressing     Problem: Diabetes Comorbidity  Goal: Blood Glucose Level Within Targeted Range  Outcome: Ongoing, Progressing     Problem: Adjustment to Illness (Sepsis/Septic Shock)  Goal: Optimal Coping  Outcome: Ongoing, Progressing     Problem: Glycemic Control Impaired (Sepsis/Septic Shock)  Goal: Blood Glucose Level Within Desired Range  Outcome: Ongoing, Progressing     Problem: Infection Progression (Sepsis/Septic Shock)  Goal: Absence of Infection Signs and Symptoms  Outcome: Ongoing, Progressing     Problem: Infection  Goal: Absence of Infection Signs and Symptoms  Outcome: Ongoing, Progressing     Problem: Impaired Wound Healing  Goal: Optimal Wound Healing  Outcome: Ongoing, Progressing     Problem: Skin Injury Risk Increased  Goal: Skin Health and Integrity  Outcome: Ongoing, Progressing     Problem: Fluid and Electrolyte Imbalance (Acute Kidney Injury/Impairment)  Goal: Fluid and Electrolyte Balance  Outcome: Ongoing, Progressing     Problem: Oral Intake Inadequate (Acute Kidney Injury/Impairment)  Goal: Optimal Nutrition Intake  Outcome: Ongoing, Progressing     Problem: Renal Function Impairment (Acute Kidney Injury/Impairment)  Goal: Effective Renal Function  Outcome: Ongoing, Progressing     Pt. A&ox3, complained of surgical site pain, PRN medication provided w/ relief. Pt. refused positional turns throughout the shift, extensive education provided regarding the importance; however, pt. refused and was combative, several reattempts made w/ little effect. Sams catheter intact, see I&Os.; EDMUNDO drain d/c by the team this am. Wound dressings clean dry and intact; extremities kept elevated. Blood sugar closely monitored. Heparin gtt. d/c @1346 per MD order. Bleeding precaution maintained. VSS. Fall/safety precaution maintained.

## 2024-03-30 NOTE — ASSESSMENT & PLAN NOTE
Patient with Persistent (7 days or more) atrial fibrillation which is uncontrolled currently with    . Patient is currently in sinus rhythm.[id number] Score: 4. . Anticoagulation indicated. Anticoagulation done with heparin/eliquis .    - ac with eliquis resumed 3/30

## 2024-03-31 PROBLEM — E87.70 EDEMA DUE TO HYPERVOLEMIA: Status: ACTIVE | Noted: 2024-03-31

## 2024-03-31 LAB
ABO + RH BLD: NORMAL
ALBUMIN SERPL BCP-MCNC: 1.4 G/DL (ref 3.5–5.2)
ALP SERPL-CCNC: 176 U/L (ref 55–135)
ALT SERPL W/O P-5'-P-CCNC: 11 U/L (ref 10–44)
ANION GAP SERPL CALC-SCNC: 7 MMOL/L (ref 8–16)
ASCENDING AORTA: 3.14 CM
AST SERPL-CCNC: 29 U/L (ref 10–40)
AV INDEX (PROSTH): 0.64
AV MEAN GRADIENT: 6 MMHG
AV PEAK GRADIENT: 9 MMHG
AV VALVE AREA BY VELOCITY RATIO: 2.33 CM²
AV VALVE AREA: 2.39 CM²
AV VELOCITY RATIO: 0.62
BASOPHILS # BLD AUTO: 0.04 K/UL (ref 0–0.2)
BASOPHILS # BLD AUTO: 0.04 K/UL (ref 0–0.2)
BASOPHILS NFR BLD: 0.4 % (ref 0–1.9)
BASOPHILS NFR BLD: 0.5 % (ref 0–1.9)
BILIRUB SERPL-MCNC: 0.2 MG/DL (ref 0.1–1)
BLD GP AB SCN CELLS X3 SERPL QL: NORMAL
BLD PROD TYP BPU: NORMAL
BLOOD UNIT EXPIRATION DATE: NORMAL
BLOOD UNIT TYPE CODE: 6200
BLOOD UNIT TYPE: NORMAL
BSA FOR ECHO PROCEDURE: 2.41 M2
BUN SERPL-MCNC: 28 MG/DL (ref 8–23)
CALCIUM SERPL-MCNC: 8.1 MG/DL (ref 8.7–10.5)
CHLORIDE SERPL-SCNC: 109 MMOL/L (ref 95–110)
CO2 SERPL-SCNC: 25 MMOL/L (ref 23–29)
CODING SYSTEM: NORMAL
CREAT SERPL-MCNC: 1.2 MG/DL (ref 0.5–1.4)
CROSSMATCH INTERPRETATION: NORMAL
CV ECHO LV RWT: 0.35 CM
DIFFERENTIAL METHOD BLD: ABNORMAL
DIFFERENTIAL METHOD BLD: ABNORMAL
DISPENSE STATUS: NORMAL
DOP CALC AO PEAK VEL: 1.49 M/S
DOP CALC AO VTI: 31.53 CM
DOP CALC LVOT AREA: 3.7 CM2
DOP CALC LVOT DIAMETER: 2.18 CM
DOP CALC LVOT PEAK VEL: 0.93 M/S
DOP CALC LVOT STROKE VOLUME: 75.36 CM3
DOP CALCLVOT PEAK VEL VTI: 20.2 CM
E WAVE DECELERATION TIME: 200.97 MSEC
E/A RATIO: 1.61
E/E' RATIO: 10.37 M/S
ECHO LV POSTERIOR WALL: 0.9 CM (ref 0.6–1.1)
EOSINOPHIL # BLD AUTO: 0.2 K/UL (ref 0–0.5)
EOSINOPHIL # BLD AUTO: 0.3 K/UL (ref 0–0.5)
EOSINOPHIL NFR BLD: 2.4 % (ref 0–8)
EOSINOPHIL NFR BLD: 2.5 % (ref 0–8)
ERYTHROCYTE [DISTWIDTH] IN BLOOD BY AUTOMATED COUNT: 15.8 % (ref 11.5–14.5)
ERYTHROCYTE [DISTWIDTH] IN BLOOD BY AUTOMATED COUNT: 16.1 % (ref 11.5–14.5)
EST. GFR  (NO RACE VARIABLE): >60 ML/MIN/1.73 M^2
FRACTIONAL SHORTENING: 32 % (ref 28–44)
GLUCOSE SERPL-MCNC: 196 MG/DL (ref 70–110)
HCT VFR BLD AUTO: 22.7 % (ref 40–54)
HCT VFR BLD AUTO: 25.1 % (ref 40–54)
HGB BLD-MCNC: 6.8 G/DL (ref 14–18)
HGB BLD-MCNC: 8 G/DL (ref 14–18)
IMM GRANULOCYTES # BLD AUTO: 0.05 K/UL (ref 0–0.04)
IMM GRANULOCYTES # BLD AUTO: 0.06 K/UL (ref 0–0.04)
IMM GRANULOCYTES NFR BLD AUTO: 0.5 % (ref 0–0.5)
IMM GRANULOCYTES NFR BLD AUTO: 0.7 % (ref 0–0.5)
INTERVENTRICULAR SEPTUM: 1.2 CM (ref 0.6–1.1)
IVRT: 62.8 MSEC
LA MAJOR: 7.02 CM
LA MINOR: 6.95 CM
LA WIDTH: 4.91 CM
LEFT ATRIUM SIZE: 3.91 CM
LEFT ATRIUM VOLUME INDEX MOD: 49.1 ML/M2
LEFT ATRIUM VOLUME INDEX: 49.1 ML/M2
LEFT ATRIUM VOLUME MOD: 113.86 CM3
LEFT ATRIUM VOLUME: 113.98 CM3
LEFT INTERNAL DIMENSION IN SYSTOLE: 3.46 CM (ref 2.1–4)
LEFT VENTRICLE DIASTOLIC VOLUME INDEX: 55.61 ML/M2
LEFT VENTRICLE DIASTOLIC VOLUME: 129.01 ML
LEFT VENTRICLE MASS INDEX: 87 G/M2
LEFT VENTRICLE SYSTOLIC VOLUME INDEX: 21.3 ML/M2
LEFT VENTRICLE SYSTOLIC VOLUME: 49.46 ML
LEFT VENTRICULAR INTERNAL DIMENSION IN DIASTOLE: 5.1 CM (ref 3.5–6)
LEFT VENTRICULAR MASS: 200.78 G
LV LATERAL E/E' RATIO: 8.24 M/S
LV SEPTAL E/E' RATIO: 14 M/S
LYMPHOCYTES # BLD AUTO: 1.3 K/UL (ref 1–4.8)
LYMPHOCYTES # BLD AUTO: 1.3 K/UL (ref 1–4.8)
LYMPHOCYTES NFR BLD: 13 % (ref 18–48)
LYMPHOCYTES NFR BLD: 15.2 % (ref 18–48)
MAGNESIUM SERPL-MCNC: 2 MG/DL (ref 1.6–2.6)
MCH RBC QN AUTO: 24.2 PG (ref 27–31)
MCH RBC QN AUTO: 26.1 PG (ref 27–31)
MCHC RBC AUTO-ENTMCNC: 30 G/DL (ref 32–36)
MCHC RBC AUTO-ENTMCNC: 31.9 G/DL (ref 32–36)
MCV RBC AUTO: 81 FL (ref 82–98)
MCV RBC AUTO: 82 FL (ref 82–98)
MONOCYTES # BLD AUTO: 0.7 K/UL (ref 0.3–1)
MONOCYTES # BLD AUTO: 0.7 K/UL (ref 0.3–1)
MONOCYTES NFR BLD: 7.2 % (ref 4–15)
MONOCYTES NFR BLD: 8.2 % (ref 4–15)
MV PEAK A VEL: 0.87 M/S
MV PEAK E VEL: 1.4 M/S
MV STENOSIS PRESSURE HALF TIME: 58.28 MS
MV VALVE AREA P 1/2 METHOD: 3.77 CM2
NEUTROPHILS # BLD AUTO: 6.4 K/UL (ref 1.8–7.7)
NEUTROPHILS # BLD AUTO: 7.7 K/UL (ref 1.8–7.7)
NEUTROPHILS NFR BLD: 73 % (ref 38–73)
NEUTROPHILS NFR BLD: 76.4 % (ref 38–73)
NRBC BLD-RTO: 0 /100 WBC
NRBC BLD-RTO: 0 /100 WBC
NUM UNITS TRANS PACKED RBC: NORMAL
OHS QRS DURATION: 88 MS
OHS QTC CALCULATION: 361 MS
PISA TR MAX VEL: 3.19 M/S
PLATELET # BLD AUTO: 458 K/UL (ref 150–450)
PLATELET # BLD AUTO: 482 K/UL (ref 150–450)
PMV BLD AUTO: 8.7 FL (ref 9.2–12.9)
PMV BLD AUTO: 9.3 FL (ref 9.2–12.9)
POCT GLUCOSE: 145 MG/DL (ref 70–110)
POCT GLUCOSE: 154 MG/DL (ref 70–110)
POCT GLUCOSE: 196 MG/DL (ref 70–110)
POCT GLUCOSE: 203 MG/DL (ref 70–110)
POCT GLUCOSE: 279 MG/DL (ref 70–110)
POTASSIUM SERPL-SCNC: 4.1 MMOL/L (ref 3.5–5.1)
PROT SERPL-MCNC: 6.3 G/DL (ref 6–8.4)
RA MAJOR: 6.2 CM
RA PRESSURE ESTIMATED: 3 MMHG
RA WIDTH: 4.87 CM
RBC # BLD AUTO: 2.81 M/UL (ref 4.6–6.2)
RBC # BLD AUTO: 3.07 M/UL (ref 4.6–6.2)
RIGHT VENTRICULAR END-DIASTOLIC DIMENSION: 3.57 CM
RV TB RVSP: 6 MMHG
SINUS: 3.32 CM
SODIUM SERPL-SCNC: 141 MMOL/L (ref 136–145)
SPECIMEN OUTDATE: NORMAL
STJ: 2.86 CM
TDI LATERAL: 0.17 M/S
TDI SEPTAL: 0.1 M/S
TDI: 0.14 M/S
TR MAX PG: 41 MMHG
TRICUSPID ANNULAR PLANE SYSTOLIC EXCURSION: 2.04 CM
TV REST PULMONARY ARTERY PRESSURE: 44 MMHG
WBC # BLD AUTO: 10.13 K/UL (ref 3.9–12.7)
WBC # BLD AUTO: 8.76 K/UL (ref 3.9–12.7)
Z-SCORE OF LEFT VENTRICULAR DIMENSION IN END DIASTOLE: -5.96
Z-SCORE OF LEFT VENTRICULAR DIMENSION IN END SYSTOLE: -3.78

## 2024-03-31 PROCEDURE — 99232 SBSQ HOSP IP/OBS MODERATE 35: CPT | Mod: ,,,

## 2024-03-31 PROCEDURE — 86920 COMPATIBILITY TEST SPIN: CPT

## 2024-03-31 PROCEDURE — 25000003 PHARM REV CODE 250

## 2024-03-31 PROCEDURE — 85025 COMPLETE CBC W/AUTO DIFF WBC: CPT | Mod: 91

## 2024-03-31 PROCEDURE — 83735 ASSAY OF MAGNESIUM: CPT

## 2024-03-31 PROCEDURE — 20600001 HC STEP DOWN PRIVATE ROOM

## 2024-03-31 PROCEDURE — 25000003 PHARM REV CODE 250: Performed by: STUDENT IN AN ORGANIZED HEALTH CARE EDUCATION/TRAINING PROGRAM

## 2024-03-31 PROCEDURE — 27000207 HC ISOLATION

## 2024-03-31 PROCEDURE — 63600175 PHARM REV CODE 636 W HCPCS

## 2024-03-31 PROCEDURE — A4216 STERILE WATER/SALINE, 10 ML: HCPCS

## 2024-03-31 PROCEDURE — P9016 RBC LEUKOCYTES REDUCED: HCPCS

## 2024-03-31 PROCEDURE — 36430 TRANSFUSION BLD/BLD COMPNT: CPT

## 2024-03-31 PROCEDURE — 80053 COMPREHEN METABOLIC PANEL: CPT

## 2024-03-31 PROCEDURE — 86850 RBC ANTIBODY SCREEN: CPT

## 2024-03-31 PROCEDURE — 85025 COMPLETE CBC W/AUTO DIFF WBC: CPT

## 2024-03-31 RX ORDER — FUROSEMIDE 10 MG/ML
40 INJECTION INTRAMUSCULAR; INTRAVENOUS ONCE
Status: COMPLETED | OUTPATIENT
Start: 2024-03-31 | End: 2024-03-31

## 2024-03-31 RX ORDER — MUPIROCIN 20 MG/G
OINTMENT TOPICAL 2 TIMES DAILY
Status: COMPLETED | OUTPATIENT
Start: 2024-03-31 | End: 2024-04-05

## 2024-03-31 RX ORDER — FUROSEMIDE 10 MG/ML
20 INJECTION INTRAMUSCULAR; INTRAVENOUS ONCE
Status: COMPLETED | OUTPATIENT
Start: 2024-03-31 | End: 2024-03-31

## 2024-03-31 RX ORDER — HYDROCODONE BITARTRATE AND ACETAMINOPHEN 500; 5 MG/1; MG/1
TABLET ORAL
Status: DISCONTINUED | OUTPATIENT
Start: 2024-03-31 | End: 2024-04-01

## 2024-03-31 RX ORDER — INSULIN ASPART 100 [IU]/ML
6-8 INJECTION, SOLUTION INTRAVENOUS; SUBCUTANEOUS
Status: DISCONTINUED | OUTPATIENT
Start: 2024-03-31 | End: 2024-04-05 | Stop reason: HOSPADM

## 2024-03-31 RX ORDER — INSULIN ASPART 100 [IU]/ML
6-8 INJECTION, SOLUTION INTRAVENOUS; SUBCUTANEOUS ONCE
Status: COMPLETED | OUTPATIENT
Start: 2024-03-31 | End: 2024-03-31

## 2024-03-31 RX ADMIN — FUROSEMIDE 20 MG: 10 INJECTION, SOLUTION INTRAMUSCULAR; INTRAVENOUS at 08:03

## 2024-03-31 RX ADMIN — APIXABAN 5 MG: 5 TABLET, FILM COATED ORAL at 08:03

## 2024-03-31 RX ADMIN — INSULIN ASPART 3 UNITS: 100 INJECTION, SOLUTION INTRAVENOUS; SUBCUTANEOUS at 02:03

## 2024-03-31 RX ADMIN — GABAPENTIN 300 MG: 300 CAPSULE ORAL at 08:03

## 2024-03-31 RX ADMIN — CLOPIDOGREL BISULFATE 75 MG: 75 TABLET ORAL at 08:03

## 2024-03-31 RX ADMIN — Medication 10 ML: at 05:03

## 2024-03-31 RX ADMIN — INSULIN ASPART 2 UNITS: 100 INJECTION, SOLUTION INTRAVENOUS; SUBCUTANEOUS at 08:03

## 2024-03-31 RX ADMIN — MUPIROCIN: 20 OINTMENT TOPICAL at 08:03

## 2024-03-31 RX ADMIN — ATORVASTATIN CALCIUM 40 MG: 40 TABLET, FILM COATED ORAL at 08:03

## 2024-03-31 RX ADMIN — Medication 10 ML: at 06:03

## 2024-03-31 RX ADMIN — Medication 10 ML: at 12:03

## 2024-03-31 RX ADMIN — COLLAGENASE SANTYL: 250 OINTMENT TOPICAL at 08:03

## 2024-03-31 RX ADMIN — TRAZODONE HYDROCHLORIDE 25 MG: 50 TABLET ORAL at 08:03

## 2024-03-31 RX ADMIN — FUROSEMIDE 40 MG: 10 INJECTION, SOLUTION INTRAVENOUS at 02:03

## 2024-03-31 RX ADMIN — NIFEDIPINE 90 MG: 30 TABLET, FILM COATED, EXTENDED RELEASE ORAL at 08:03

## 2024-03-31 RX ADMIN — INSULIN ASPART 8 UNITS: 100 INJECTION, SOLUTION INTRAVENOUS; SUBCUTANEOUS at 02:03

## 2024-03-31 RX ADMIN — INSULIN ASPART 6 UNITS: 100 INJECTION, SOLUTION INTRAVENOUS; SUBCUTANEOUS at 09:03

## 2024-03-31 RX ADMIN — PANTOPRAZOLE SODIUM 40 MG: 40 TABLET, DELAYED RELEASE ORAL at 08:03

## 2024-03-31 RX ADMIN — TAMSULOSIN HYDROCHLORIDE 0.4 MG: 0.4 CAPSULE ORAL at 08:03

## 2024-03-31 RX ADMIN — Medication 10 ML: at 11:03

## 2024-03-31 NOTE — ASSESSMENT & PLAN NOTE
BG goal: 140-180    - Levemir (Insulin Detemir) 17 units BID (20% increase due to fasting blood glucose above goal)  - Novolog (Insulin Aspart) 6-8 units with meals (Administer    6    units if patient eats 25-50% of meal, administer    8    units if patient eats > 50% of meal.) (20% increase due to prandial blood glucose  above goal) and prn for BG excursions [facility] SSI (150/25)  - BG checks //0200   - Hypoglycemia protocol in place     ** Please notify Endocrine for any change and/or advance in diet**  ** Please call Endocrine for any BG related issues **     Discharge Planning:   TBD. Please notify endocrinology prior to discharge.

## 2024-03-31 NOTE — ASSESSMENT & PLAN NOTE
Patient's anemia is currently uncontrolled. Has received 2 units of PRBCs on 3/23 and 3/16 . Etiology likely d/t chronic blood loss and Iron deficiency. Receiving 1u on 3/26 preoperatively    Current CBC reviewed-   Lab Results   Component Value Date    HGB 6.8 (L) 03/31/2024    HCT 22.7 (L) 03/31/2024     Monitor serial CBC and transfuse if patient becomes hemodynamically unstable, symptomatic or H/H drops below 7/21.

## 2024-03-31 NOTE — SUBJECTIVE & OBJECTIVE
Interval History: Abnormality on tele overnight, EKG showed 2nd degree AVB, pt asymptomatic. Has had similar AVB on prior EKGs, last 3/13 and also asymptomatic. [facility]      Objective:     Vital Signs (Most Recent):  Temp: 99.1 °F (37.3 °C) (03/31/24 0753)  Pulse: (!) 56 (03/31/24 1122)  Resp: 19 (03/31/24 0753)  BP: (!) 129/58 (03/31/24 1122)  SpO2: (!) 94 % (03/31/24 0900) Vital Signs (24h Range):  Temp:  [98.5 °F (36.9 °C)-99.4 °F (37.4 °C)] 99.1 °F (37.3 °C)  Pulse:  [56-92] 56  Resp:  [15-31] 19  SpO2:  [93 %-96 %] 94 %  BP: (112-142)/(58-67) 129/58     Weight: 119.7 kg (264 lb)  Body mass index is 38.99 kg/m².    Intake/Output Summary (Last 24 hours) at 3/31/2024 1255  Last data filed at 3/31/2024 0745  Gross per 24 hour   Intake 200 ml   Output 1350 ml   Net -1150 ml         Physical Exam  Constitutional:       General: He is not in acute distress.     Appearance: Normal appearance. He is well-developed. Ill appearance: chronically ill appearing.   HENT:      Head: Normocephalic and atraumatic.      Right Ear: External ear normal.      Left Ear: External ear normal.      Nose: Nose normal.   Eyes:      General:         Right eye: No discharge.         Left eye: No discharge.      Conjunctiva/sclera: Conjunctivae normal.   Cardiovascular:      Rate and Rhythm: Normal rate and regular rhythm.      Pulses: Normal pulses.   Pulmonary:      Effort: Pulmonary effort is normal. No respiratory distress.      Breath sounds: No stridor.   Abdominal:      General: Abdomen is flat.      Palpations: Abdomen is soft.      Tenderness: There is no abdominal tenderness.   Musculoskeletal:         General: Swelling and deformity present.      Cervical back: Normal range of motion.      Comments: S/p L-AKA, bandage CDI  RUE swelling d/t DVT  LUE swelling noted 3/31   Skin:     General: Skin is warm and dry.   Neurological:      General: No focal deficit present.      Mental Status: He is alert.             Significant Labs: All  pertinent labs within the past 24 hours have been reviewed.    Significant Imaging: I have reviewed all pertinent imaging results/findings within the past 24 hours.

## 2024-03-31 NOTE — PLAN OF CARE
Pt AAOx3, c/o of L stump pain which very well controlled with current pain regimen. Sams in place CDI. 2nd degree mobitz 1 showed on EKG, MD made aware, pt asymptomatic, VSS. POC on going.    Problem: Adult Inpatient Plan of Care  Goal: Plan of Care Review  Outcome: Ongoing, Progressing  Goal: Absence of Hospital-Acquired Illness or Injury  Outcome: Ongoing, Progressing  Goal: Optimal Comfort and Wellbeing  Outcome: Ongoing, Progressing  Goal: Readiness for Transition of Care  Outcome: Ongoing, Progressing     Problem: Diabetes Comorbidity  Goal: Blood Glucose Level Within Targeted Range  Outcome: Ongoing, Progressing     Problem: Impaired Wound Healing  Goal: Optimal Wound Healing  Outcome: Ongoing, Progressing     Problem: Skin Injury Risk Increased  Goal: Skin Health and Integrity  Outcome: Ongoing, Progressing

## 2024-03-31 NOTE — NURSING
Dr Leatha avalos notified that pt seems like running 2nd degree AV block type r with HI interval getting longer and longer. Pt has been running afib previously, so this is Rhythm changed. Pt is asymptomatic. VSS. EKG was done and it showed sinus rhythm with 2nd degree AV block. MD made aware. No new order received.

## 2024-03-31 NOTE — NURSING
Hgb: 6.8-  Delay in blood transfusion as pt. Off unit for an ultrasound of b/l upper extremities. Unable to obtain type and screen VICENTE. Will obtain once pt. in unit.

## 2024-03-31 NOTE — PROGRESS NOTES
[facility] - Telemetry Stepdown  Endocrinology  Progress Note    Admit Date: 3/5/2024     Reason for Consult: Management of T2DM, Hyperglycemia     Diabetes diagnosis year: > 25 years    Home Diabetes Medications:  Novolog 6 units w/ meals and Lantus 45 units nightly (states he is not taking).     How often checking glucose at home?  Not really checking    BG readings on regimen: 200's  Hypoglycemia on the regimen?  Yes (When he was taking the Lantus)  Missed doses on regimen?  Yes    Diabetes Complications include:     Hyperglycemia, Hypoglycemia , Diabetic chronic kidney disease     , Diabetic dermatitis, Foot ulcer  , and Periodontal disease    Complicating diabetes co morbidities:   HTN; HLD      HPI: Saji Castañeda is a 75 yo M with a history of Afib, T2D, HLD, HTN, chronic osteomyelitis of L heel, s/p L TMA, PID, ESBL Klebsiella and Acinetobacter bacteremia who was admitted after being found down at home and DKA. Endocrine consulted to manage hyperglycemia and type 2 diabetes.   Hemoglobin A1C   Date Value Ref Range Status   03/05/2024 >14.0 (H) 4.0 - 5.6 % Final     Comment:     ADA Screening Guidelines:  5.7-6.4%  Consistent with prediabetes  >or=6.5%  Consistent with diabetes    High levels of fetal hemoglobin interfere with the HbA1C  assay. Heterozygous hemoglobin variants (HbS, HgC, etc)do  not significantly interfere with this assay.   However, presence of multiple variants may affect accuracy.     09/13/2023 11.0 (H) 4.5 - 5.7 % Final   03/10/2023 8.4 (H) 4.0 - 5.6 % Final     Comment:     ADA Screening Guidelines:  5.7-6.4%  Consistent with prediabetes  >or=6.5%  Consistent with diabetes    High levels of fetal hemoglobin interfere with the HbA1C  assay. Heterozygous hemoglobin variants (HbS, HgC, etc)do  not significantly interfere with this assay.   However, presence of multiple variants may affect accuracy.     09/17/2022 9.9 (H) 4.0 - 5.6 % Final     Comment:     ADA Screening Guidelines:  5.7-6.4%   "Consistent with prediabetes  >or=6.5%  Consistent with diabetes    High levels of fetal hemoglobin interfere with the HbA1C  assay. Heterozygous hemoglobin variants (HbS, HgC, etc)do  not significantly interfere with this assay.   However, presence of multiple variants may affect accuracy.              Interval HPI:   No acute events overnight. Patient in room 8078/8078 A. Blood glucose stable. BG above goal on current insulin regimen (SSI, prandial, and basal insulin ). Steroid use- None. Sliding scale not given throughout the day yesterday.     4 Days Post-Op  Renal function- Abnormal    Vasopressors-  None     Diet diabetic  Calorie     Eatin%   Nausea: No  Hypoglycemia and intervention: No  Fever: No  TPN and/or TF: No    BP (!) 129/58 (BP Location: Left arm, Patient Position: Lying)   Pulse 64   Temp 99.1 °F (37.3 °C) (Oral)   Resp 19   Ht 5' 9" (1.753 m)   Wt 120.2 kg (264 lb 15.9 oz)   SpO2 (!) 94%   BMI 39.13 kg/m²     Labs Reviewed and Include    No results for input(s): "GLU", "CALCIUM", "ALBUMIN", "PROT", "NA", "K", "CO2", "CL", "BUN", "CREATININE", "ALKPHOS", "ALT", "AST", "BILITOT" in the last 24 hours.  Lab Results   Component Value Date    WBC 9.26 2024    HGB 7.1 (L) 2024    HCT 23.1 (L) 2024    MCV 82 2024     2024     No results for input(s): "TSH", "FREET4" in the last 168 hours.  Lab Results   Component Value Date    HGBA1C >14.0 (H) 2024       Nutritional status:   Body mass index is 39.13 kg/m².  Lab Results   Component Value Date    ALBUMIN 1.5 (L) 2024    ALBUMIN 1.6 (L) 2024    ALBUMIN 1.7 (L) 2024     Lab Results   Component Value Date    PREALBUMIN 17 (L) 2014    PREALBUMIN 16 (L) 2014    PREALBUMIN 19 (L) 2014       Estimated Creatinine Clearance: 58.4 mL/min (based on SCr of 1.4 mg/dL).    Accu-Checks  Recent Labs     24  1330 24  1525 24  2141 24  0222 24  0823 " 03/30/24  1243 03/30/24  1632 03/30/24  2154 03/31/24  0159 03/31/24  0837   POCTGLUCOSE 223* 232* 107 203* 194* 206* 228* 311* 279* 196*       Current Medications and/or Treatments Impacting Glycemic Control  Immunotherapy:    Immunosuppressants       None          Steroids:   Hormones (From admission, onward)      Start     Stop Route Frequency Ordered    03/28/24 1128  melatonin tablet 6 mg         -- Oral Nightly PRN 03/28/24 1028          Pressors:    Autonomic Drugs (From admission, onward)      None          Hyperglycemia/Diabetes Medications:   Antihyperglycemics (From admission, onward)      Start     Stop Route Frequency Ordered    03/31/24 1130  insulin aspart U-100 pen 6-8 Units         -- SubQ 3 times daily with meals 03/31/24 0845    03/31/24 0915  insulin aspart U-100 pen 6-8 Units         -- SubQ Once 03/31/24 0846    03/31/24 0900  insulin detemir U-100 (Levemir) pen 17 Units         -- SubQ 2 times daily 03/31/24 0845    03/29/24 2346  insulin aspart U-100 pen 0-10 Units         -- SubQ Before meals, nightly and at 0200 PRN 03/29/24 2247            ASSESSMENT and PLAN    Renal/  Stage 3b chronic kidney disease  Titrate insulin slowly to avoid hypoglycemia as the risk of hypoglycemia increases with decreased creatinine clearance.        ID  * Osteomyelitis of left lower extremity  Managed per primary team  Optimize BG control to improve wound healing        Endocrine  Insulin dependent type 2 diabetes mellitus  BG goal: 140-180    - Levemir (Insulin Detemir) 17 units BID (20% increase due to fasting blood glucose above goal)  - Novolog (Insulin Aspart) 6-8 units with meals (Administer    6    units if patient eats 25-50% of meal, administer    8    units if patient eats > 50% of meal.) (20% increase due to prandial blood glucose  above goal) and prn for BG excursions [facility] SSI (150/25)  - BG checks AC/HS/0200   - Hypoglycemia protocol in place     ** Please notify Endocrine for any change and/or  advance in diet**  ** Please call Endocrine for any BG related issues **     Discharge Planning:   TBD. Please notify endocrinology prior to discharge.        Missy Yadav PA-C  Endocrinology  [facility] - Telemetry Stepdown

## 2024-03-31 NOTE — ASSESSMENT & PLAN NOTE
Insulin mgmt per endocrine    Patient's FSGs are uncontrolled due to hyperglycemia on current medication regimen.  Last A1c reviewed-   Lab Results   Component Value Date    HGBA1C >14.0 (H) 03/05/2024     Most recent fingerstick glucose reviewed-   Recent Labs   Lab 03/30/24  2154 03/31/24  0159 03/31/24  0837 03/31/24  1231   POCTGLUCOSE 311* 279* 196* 145*       Current correctional scale  Low  Increase anti-hyperglycemic dose as follows-   Antihyperglycemics (From admission, onward)    Start     Stop Route Frequency Ordered    03/31/24 1130  insulin aspart U-100 pen 6-8 Units         -- SubQ 3 times daily with meals 03/31/24 0845    03/31/24 0900  insulin detemir U-100 (Levemir) pen 17 Units         -- SubQ 2 times daily 03/31/24 0845    03/29/24 2346  insulin aspart U-100 pen 0-10 Units         -- SubQ Before meals, nightly and at 0200 PRN 03/29/24 2247        Hold Oral hypoglycemics while patient is in the hospital.    - Endocrine's following an making insulin adjustments as needed.

## 2024-03-31 NOTE — ASSESSMENT & PLAN NOTE
Creatine stable for now. BMP reviewed- noted Estimated Creatinine Clearance: 67.9 mL/min (based on SCr of 1.2 mg/dL). according to latest data. Based on current GFR, CKD stage is stage 3 - GFR 30-59.  Monitor UOP and serial BMP and adjust therapy as needed. Renally dose meds. Avoid nephrotoxic medications and procedures.

## 2024-03-31 NOTE — ASSESSMENT & PLAN NOTE
Chronic, uncontrolled. Latest blood pressure and vitals reviewed-     Temp:  [98.5 °F (36.9 °C)-99.4 °F (37.4 °C)]   Pulse:  [56-92]   Resp:  [15-31]   BP: (112-142)/(58-67)   SpO2:  [93 %-96 %] .   Home meds for hypertension were reviewed and noted below.   Hypertension Medications               furosemide (LASIX) 40 MG tablet Take 20 mg by mouth.    hydrALAZINE (APRESOLINE) 100 MG tablet Take 1 tablet (100 mg total) by mouth every 8 (eight) hours.    isosorbide dinitrate (ISORDIL) 10 MG tablet Take 1 tablet (10 mg total) by mouth 3 (three) times daily.    NIFEdipine (PROCARDIA-XL) 60 MG (OSM) 24 hr tablet Take 1 tablet (60 mg total) by mouth once daily.            While in the hospital, will manage blood pressure as follows; Adjust home antihypertensive regimen as follows- will keep nifedipine - increased to 90     Will utilize p.r.n. blood pressure medication only if patient's blood pressure greater than 180/110 and he develops symptoms such as worsening chest pain or shortness of breath.

## 2024-03-31 NOTE — ASSESSMENT & PLAN NOTE
Resolving    Patient with acute kidney injury/acute renal failure likely due to pre-renal azotemia due to dehydration and post-obstructive d/t urinary retention  YNES is currently stable. Baseline creatinine  1.1  - Labs reviewed- Renal function/electrolytes with Estimated Creatinine Clearance: 67.9 mL/min (based on SCr of 1.2 mg/dL). according to latest data. Monitor urine output and serial BMP and adjust therapy as needed. Avoid nephrotoxins and renally dose meds for GFR listed above.    Creatinine 2.1 on admit, baseline around 1.1  - prerenal vs obstructive (fung placed)     Lab Results   Component Value Date    CREATININE 1.2 03/31/2024       Plan:   - Strict I&Os and daily weights   - Daily BMP  - Trend sCr  - Avoid nephrotoxic agents such as NSAIDs, ACEi, ARBs and IV radiocontrast.  - Renally dose meds to current GFR.  - Maintain MAP > 65.  - No indications for HD at this time.   - Maintain electrolytes at goal: Mg > 2, Phos > 3, K > 4.

## 2024-03-31 NOTE — PLAN OF CARE
Problem: Adult Inpatient Plan of Care  Goal: Plan of Care Review  Outcome: Ongoing, Progressing     Problem: Diabetes Comorbidity  Goal: Blood Glucose Level Within Targeted Range  Outcome: Ongoing, Progressing     Problem: Adjustment to Illness (Sepsis/Septic Shock)  Goal: Optimal Coping  Outcome: Ongoing, Progressing     Problem: Bleeding (Sepsis/Septic Shock)  Goal: Absence of Bleeding  Outcome: Ongoing, Progressing     Problem: Glycemic Control Impaired (Sepsis/Septic Shock)  Goal: Blood Glucose Level Within Desired Range  Outcome: Ongoing, Progressing     Problem: Infection Progression (Sepsis/Septic Shock)  Goal: Absence of Infection Signs and Symptoms  Outcome: Ongoing, Progressing     Problem: Nutrition Impaired (Sepsis/Septic Shock)  Goal: Optimal Nutrition Intake  Outcome: Ongoing, Progressing     Problem: Infection  Goal: Absence of Infection Signs and Symptoms  Outcome: Ongoing, Progressing     Problem: Impaired Wound Healing  Goal: Optimal Wound Healing  Outcome: Ongoing, Progressing     Problem: Skin Injury Risk Increased  Goal: Skin Health and Integrity  Outcome: Ongoing, Progressing     Problem: Fall Injury Risk  Goal: Absence of Fall and Fall-Related Injury  Outcome: Ongoing, Progressing     Problem: Fluid and Electrolyte Imbalance (Acute Kidney Injury/Impairment)  Goal: Fluid and Electrolyte Balance  Outcome: Ongoing, Progressing     Problem: Renal Function Impairment (Acute Kidney Injury/Impairment)  Goal: Effective Renal Function  Outcome: Ongoing, Progressing     Pt. A&Ox3. VSS, denies chest pain or SOB. On 2L saturating >95, encouraged to utilize incentive spirometry, verbalized understanding; however, needs more encouragement. Pt. refusing turns most of the shift; extesive education provided on importance; however, pt. continued to refuse. Wound care provided. S/P 1 PRBC transfusion w/ no adverse reaction, tolerated well. Blood sugar closely monitored.   Fall precaution maintained.

## 2024-03-31 NOTE — ASSESSMENT & PLAN NOTE
Patient developed new urinary retention.     -Fung's placed by urology  -Flomax  -Renal ultrasound showed no hydronephrosis  - Making adequate urine with fung, c/f hematuria which developed 3/25 and resolved with repositioning of fung. See urology note from same date  - voiding trial prior to DC. If unable to void, urology f/u outpatient

## 2024-03-31 NOTE — ASSESSMENT & PLAN NOTE
- eliquis transitioned to Heparin which was paused temporarily d/t concern of new hematuria which developed 3/25. Discussed with urology, the hematuria resolved with repositioning of the fung. Heparin resumed, but will temporarily be paused as patient needs preoperative blood transfusion and has minimal IV access d/t placement difficulties & limb alert on R-arm d/t DVT    - resumed eliquis 3/30     - 3/31 new LUE swelling c/f DVT, US ordered, if positive will increase Eliquis to loading dose 10mg BID

## 2024-03-31 NOTE — ASSESSMENT & PLAN NOTE
Patient with Persistent (7 days or more) atrial fibrillation which is uncontrolled currently with    . Patient is currently in sinus rhythm.[id number] Score: 4. . Anticoagulation indicated. Anticoagulation done with heparin/eliquis .    - ac with eliquis resumed 3/30

## 2024-03-31 NOTE — PROGRESS NOTES
[facility] - Telemetry [facility] Medicine  Progress Note    Patient Name: Saji Castañeda  MRN: [id number]  Patient Class: IP- Inpatient   Admission Date: 3/5/2024  Length of Stay: 26 days  Attending Physician: Jm Rosa MD  Primary Care Provider: [facility] -        Subjective:     Principal Problem:Osteomyelitis of left lower extremity        HPI:  Saji Castañeda is a 74 y.o. male with a medical history of DM2, HLD, HTN, chronic osteomyelitis of L heel, L TMA, PAD, hx of ESBL klebsiella, acinetobacter bacteremia, presenting with hyperglycemia. He presented to the ED via EMS and his POCT glucose on arrival was >500. Serum blood glucose 667 with anion gap of 17 and elevated ketones. Serum potassium 3.4, corrected sodium 149. Urinalysis significant for RBCs, glucosuria, and moderate bacteria and yeast. CBC revealed leukocytosis, elevated platelets, and mild anemia. CMP shows Na 135, K 3.4, BUN 29, Cr 2.1, Glucose 677, Lactate 4.61. BNP and troponin elevated. GFR 32.2. Insulin drip, IV fluids, zosyn, vancomycin, and potassium given.      He is unable to provide much history, but states that he has not been taking his home medications for at least a week. He reports shortness of breath, fatigue, and weakness for the last 6-7 days. He has chills and reports episodes of emesis. He denies abdominal pain, chest pain, palpitations, recent illness/infection, fevers, changes to bowel movements and urinary output. Patient lives with his brother and sister at home. He was last seen in the ED on 2/21 after a fall. He was hypoglycemic BGL 60 at that time which returned to normal after eating. He was also scheduled for a wound clinic appointment today 3/5 at [facility] with Dr. Wilson.        Overview/Hospital Course:  Wound care and cultures collected by Podiatry with recs for Vascular consult for amputation. Patient declined amputation. ID recommending IV Ertapenem for chronic osteomyelitis for 6 week duration  Please see previous message and assist. Thank you!  (EED: 4/15/24). With high wound care needs and IV antibiotics plan patient needing long-term placement. He is being treated with topical permethrin for bedbugs. Wound care has been dressing the wounds. 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 Still adjusting insulin to control sugars better. Giving fluids for YNES. Poor PO intake even with feeding assistance. LTAC authorization denied by [employer]. Waiting for SNF placement ([facility], awaiting auth and family to complete paperwork). Patient started spiking fever again and infectious work up done. ID re-consulted. Patient has been counseled multiple times that source control cannot be achieved without amputation. Patient agreeable to AKA scheduled for 3/27. Transitioned to heparin from eliquis. Fung placed for urinary rentention. Discussed new hematuria with urology, hematuria resolved with repositioning of fung. Pt to receive 1u PRBC preoperatively per vascular surgery. S/p L AKA on 3/27 with vascular surgery. ID rec 24-48h post-op abx now that source control is achieved, unless further concerned for infection. Delirium precautions + trazodone added for sleep hygiene, rescheduled lab times appropriately. Resuming heparin drip + plavix 3/29 per vascular surg. 3/30 transitioned to eliquis from heparin gtt. LUE swelling noted 3/31, repeat DVT US ordered. Hgb downtrending at 6.8, will receive 1u PRBC, no overt signs of bleeding.      Interval History: Abnormality on tele overnight, EKG showed 2nd degree AVB, pt asymptomatic. Has had similar AVB on prior EKGs, last 3/13 and also asymptomatic. [facility]      Objective:     Vital Signs (Most Recent):  Temp: 99.1 °F (37.3 °C) (03/31/24 0753)  Pulse: (!) 56 (03/31/24 1122)  Resp: 19 (03/31/24 0753)  BP: (!) 129/58 (03/31/24 1122)  SpO2: (!) 94 % (03/31/24 0900) Vital Signs (24h Range):  Temp:  [98.5 °F (36.9 °C)-99.4 °F (37.4 °C)] 99.1 °F (37.3 °C)  Pulse:  [56-92]  56  Resp:  [15-31] 19  SpO2:  [93 %-96 %] 94 %  BP: (112-142)/(58-67) 129/58     Weight: 119.7 kg (264 lb)  Body mass index is 38.99 kg/m².    Intake/Output Summary (Last 24 hours) at 3/31/2024 1255  Last data filed at 3/31/2024 0745  Gross per 24 hour   Intake 200 ml   Output 1350 ml   Net -1150 ml         Physical Exam  Constitutional:       General: He is not in acute distress.     Appearance: Normal appearance. He is well-developed. Ill appearance: chronically ill appearing.   HENT:      Head: Normocephalic and atraumatic.      Right Ear: External ear normal.      Left Ear: External ear normal.      Nose: Nose normal.   Eyes:      General:         Right eye: No discharge.         Left eye: No discharge.      Conjunctiva/sclera: Conjunctivae normal.   Cardiovascular:      Rate and Rhythm: Normal rate and regular rhythm.      Pulses: Normal pulses.   Pulmonary:      Effort: Pulmonary effort is normal. No respiratory distress.      Breath sounds: No stridor.   Abdominal:      General: Abdomen is flat.      Palpations: Abdomen is soft.      Tenderness: There is no abdominal tenderness.   Musculoskeletal:         General: Swelling and deformity present.      Cervical back: Normal range of motion.      Comments: S/p L-AKA, bandage CDI  RUE swelling d/t DVT  LUE swelling noted 3/31   Skin:     General: Skin is warm and dry.   Neurological:      General: No focal deficit present.      Mental Status: He is alert.             Significant Labs: All pertinent labs within the past 24 hours have been reviewed.    Significant Imaging: I have reviewed all pertinent imaging results/findings within the past 24 hours.    Assessment/Plan:      * Osteomyelitis of left lower extremity  Resolved w/ source control, d/c abx today    Patient with Proteus and enterobacter on leg cultures collected by Podiatry with concern for acute on chronic osteo. Fungal culture grew fusarium. He has started spiking fever and still is not agreeable to  amputation, will do infectious work up to look out for source.    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 Can discontinue antimicrobials 24-48 hours after surgery as long term treatment is not indicated.       Edema due to hypervolemia  Pt with developing extremity edema, partially d/t known RUE DVT which doesn't explain the LUE/RLE edema. Will trial diuresis, obtain echo, and DVT US for new LUE swelling      Urinary retention  Patient developed new urinary retention.     -Fung's placed by urology  -Flomax  -Renal ultrasound showed no hydronephrosis  - Making adequate urine with fung, c/f hematuria which developed 3/25 and resolved with repositioning of fung. See urology note from same date  - voiding trial prior to DC. If unable to void, urology f/u outpatient       BPH (benign prostatic hyperplasia)  Fung in placed, placed by urology  Flomax daily      Localized swelling of right upper extremity  Patient has swelling and pain in the right arm and some swelling in the left arm as well    -US b/l upper extremity for DVT showed clot  - transitioned from heparin to eliquis 3/30  - Increasing diuresis     History of Bedbug bite with infection  Patient started on Permethrin daily for 5 days starting 3/12      Proteus infection  Abx d/c'ed 3/30    Was on ertapenem to cover proteus/enterobacter growing from leg wound; source control achieved s/p L AKA    Chronic anticoagulation  - See DVT      History of amputation of left high mid foot   See osteo      Stage 3b chronic kidney disease  Creatine stable for now. BMP reviewed- noted Estimated Creatinine Clearance: 67.9 mL/min (based on SCr of 1.2 mg/dL). according to latest data. Based on current GFR, CKD stage is stage 3 - GFR 30-59.  Monitor UOP  "and serial BMP and adjust therapy as needed. Renally dose meds. Avoid nephrotoxic medications and procedures.    Severe sepsis  Source control achieved with proximal amputation. Per ID can discontinue antimicrobials 24-48 hours after surgery as long term treatment is not indicated    This patient does have evidence of infective focus  My overall impression is sepsis.  Source: Skin and Soft Tissue (location left leg)  Antibiotics given-   Antibiotics (72h ago, onward)      None          Latest lactate reviewed-  No results for input(s): "LACTATE", "POCLAC" in the last 72 hours.    Organ dysfunction indicated by Acute kidney injury    Fluid challenge Actual Body weight- Patient will receive 30ml/kg actual body weight to calculate fluid bolus for treatment of septic shock.     Post- resuscitation assessment No - Post resuscitation assessment not needed       Will Not start Pressors-     - See osteo      Other hyperlipidemia  Continue home atorvastatin      Diabetic ketoacidosis without coma associated with type 2 diabetes mellitus  Resolved    Patient's FSGs are uncontrolled due to hyperglycemia on current medication regimen.  Last A1c reviewed-   Lab Results   Component Value Date    HGBA1C >14.0 (H) 03/05/2024     Most recent fingerstick glucose reviewed-   Recent Labs   Lab 03/30/24  2154 03/31/24  0159 03/31/24  0837 03/31/24  1231   POCTGLUCOSE 311* 279* 196* 145*       Current correctional scale   LD SSI  Maintain anti-hyperglycemic dose as follows-   Antihyperglycemics (From admission, onward)      Start     Stop Route Frequency Ordered    03/31/24 1130  insulin aspart U-100 pen 6-8 Units         -- SubQ 3 times daily with meals 03/31/24 0845    03/31/24 0900  insulin detemir U-100 (Levemir) pen 17 Units         -- SubQ 2 times daily 03/31/24 0845    03/29/24 2346  insulin aspart U-100 pen 0-10 Units         -- SubQ Before meals, nightly and at 0200 PRN 03/29/24 2247          Hold Oral hypoglycemics while " patient is in the hospital.  Will keep patient on DKA pathway with insulin drip and fluid with protocol in place for switching to subcutaneous insulin as anion gap closes.      DKA    - Resolved  - Continue insulin regimen and BG checks TIDWM and QHS and 2am  - patient tends to become hypoglycemic around dinnertime  - could be insulin stacking from breakfast and lunch insulin aspart given poor PO intake despite assistance with feeding   - detemir 14 unit BID  - aspart scheduled w/ only breakfast and lunch and dinner with different units  -Inpatient consult to Endocrine    Paroxysmal atrial fibrillation  Patient with Persistent (7 days or more) atrial fibrillation which is uncontrolled currently with    . Patient is currently in sinus rhythm.[id number] Score: 4. . Anticoagulation indicated. Anticoagulation done with heparin/eliquis .    - ac with eliquis resumed 3/30    Chronic deep vein thrombosis (DVT) of right upper extremity  - eliquis transitioned to Heparin which was paused temporarily d/t concern of new hematuria which developed 3/25. Discussed with urology, the hematuria resolved with repositioning of the fung. Heparin resumed, but will temporarily be paused as patient needs preoperative blood transfusion and has minimal IV access d/t placement difficulties & limb alert on R-arm d/t DVT    - resumed eliquis 3/30     - 3/31 new LUE swelling c/f DVT, US ordered, if positive will increase Eliquis to loading dose 10mg BID      Iron deficiency anemia  Patient's anemia is currently uncontrolled. Has received 2 units of PRBCs on 3/23 and 3/16 . Etiology likely d/t chronic blood loss and Iron deficiency. Receiving 1u on 3/26 preoperatively    Current CBC reviewed-   Lab Results   Component Value Date    HGB 6.8 (L) 03/31/2024    HCT 22.7 (L) 03/31/2024     Monitor serial CBC and transfuse if patient becomes hemodynamically unstable, symptomatic or H/H drops below 7/21.    Cellulitis of left lower leg  Resolved w/  source control, see osteo      Peripheral arterial disease  Resumed plavix 3/29      YNES (acute kidney injury)  Resolving    Patient with acute kidney injury/acute renal failure likely due to pre-renal azotemia due to dehydration and post-obstructive d/t urinary retention  YNES is currently stable. Baseline creatinine  1.1  - Labs reviewed- Renal function/electrolytes with Estimated Creatinine Clearance: 67.9 mL/min (based on SCr of 1.2 mg/dL). according to latest data. Monitor urine output and serial BMP and adjust therapy as needed. Avoid nephrotoxins and renally dose meds for GFR listed above.    Creatinine 2.1 on admit, baseline around 1.1  - prerenal vs obstructive (fung placed)     Lab Results   Component Value Date    CREATININE 1.2 03/31/2024       Plan:   - Strict I&Os and daily weights   - Daily BMP  - Trend sCr  - Avoid nephrotoxic agents such as NSAIDs, ACEi, ARBs and IV radiocontrast.  - Renally dose meds to current GFR.  - Maintain MAP > 65.  - No indications for HD at this time.   - Maintain electrolytes at goal: Mg > 2, Phos > 3, K > 4.           Insulin dependent type 2 diabetes mellitus  Insulin mgmt per endocrine    Patient's FSGs are uncontrolled due to hyperglycemia on current medication regimen.  Last A1c reviewed-   Lab Results   Component Value Date    HGBA1C >14.0 (H) 03/05/2024     Most recent fingerstick glucose reviewed-   Recent Labs   Lab 03/30/24  2154 03/31/24  0159 03/31/24  0837 03/31/24  1231   POCTGLUCOSE 311* 279* 196* 145*       Current correctional scale  Low  Increase anti-hyperglycemic dose as follows-   Antihyperglycemics (From admission, onward)      Start     Stop Route Frequency Ordered    03/31/24 1130  insulin aspart U-100 pen 6-8 Units         -- SubQ 3 times daily with meals 03/31/24 0845    03/31/24 0900  insulin detemir U-100 (Levemir) pen 17 Units         -- SubQ 2 times daily 03/31/24 0845    03/29/24 2346  insulin aspart U-100 pen 0-10 Units         -- SubQ  Before meals, nightly and at 0200 PRN 03/29/24 2247          Hold Oral hypoglycemics while patient is in the hospital.    - Endocrine's following an making insulin adjustments as needed.    Essential hypertension  Chronic, uncontrolled. Latest blood pressure and vitals reviewed-     Temp:  [98.5 °F (36.9 °C)-99.4 °F (37.4 °C)]   Pulse:  [56-92]   Resp:  [15-31]   BP: (112-142)/(58-67)   SpO2:  [93 %-96 %] .   Home meds for hypertension were reviewed and noted below.   Hypertension Medications               furosemide (LASIX) 40 MG tablet Take 20 mg by mouth.    hydrALAZINE (APRESOLINE) 100 MG tablet Take 1 tablet (100 mg total) by mouth every 8 (eight) hours.    isosorbide dinitrate (ISORDIL) 10 MG tablet Take 1 tablet (10 mg total) by mouth 3 (three) times daily.    NIFEdipine (PROCARDIA-XL) 60 MG (OSM) 24 hr tablet Take 1 tablet (60 mg total) by mouth once daily.            While in the hospital, will manage blood pressure as follows; Adjust home antihypertensive regimen as follows- will keep nifedipine - increased to 90     Will utilize p.r.n. blood pressure medication only if patient's blood pressure greater than 180/110 and he develops symptoms such as worsening chest pain or shortness of breath.      VTE Risk Mitigation (From admission, onward)           Ordered     apixaban tablet 5 mg  2 times daily         03/30/24 1346     heparin 25,000 units in dextrose 5% 250 mL (100 units/mL) infusion HIGH INTENSITY nomogram - [facility]  Continuous        Question:  Begin at (units/kg/hr)  Answer:  18 03/20/24 0796                    Discharge Planning   OXANA: 4/1/2024     Code Status: Full Code   Is the patient medically ready for discharge?: No    Reason for patient still in hospital (select all that apply): Patient trending condition  Discharge Plan A: Rehab (TBD)   Discharge Delays: None known at this time              Jori Byrd MD  Department of Hospital Medicine   [facility] - Telemetry Stepdown

## 2024-03-31 NOTE — ASSESSMENT & PLAN NOTE
Pt with developing extremity edema, partially d/t known RUE DVT which doesn't explain the LUE/RLE edema. Will trial diuresis, obtain echo, and DVT US for new LUE swelling

## 2024-03-31 NOTE — ASSESSMENT & PLAN NOTE
Patient has swelling and pain in the right arm and some swelling in the left arm as well    -US b/l upper extremity for DVT showed clot  - transitioned from heparin to eliquis 3/30  - Increasing diuresis

## 2024-03-31 NOTE — SUBJECTIVE & OBJECTIVE
"Interval HPI:   No acute events overnight. Patient in room 8078/8078 A. Blood glucose stable. BG above goal on current insulin regimen (SSI, prandial, and basal insulin ). Steroid use- None. Sliding scale continues to not be given throughout the day.     4 Days Post-Op  Renal function- Abnormal    Vasopressors-  None     Diet diabetic  Calorie     Eatin-50%  Nausea: No  Hypoglycemia and intervention: No  Fever: No  TPN and/or TF: No    BP (!) 129/58 (BP Location: Left arm, Patient Position: Lying)   Pulse 64   Temp 99.1 °F (37.3 °C) (Oral)   Resp 19   Ht 5' 9" (1.753 m)   Wt 120.2 kg (264 lb 15.9 oz)   SpO2 (!) 94%   BMI 39.13 kg/m²     Labs Reviewed and Include    No results for input(s): "GLU", "CALCIUM", "ALBUMIN", "PROT", "NA", "K", "CO2", "CL", "BUN", "CREATININE", "ALKPHOS", "ALT", "AST", "BILITOT" in the last 24 hours.  Lab Results   Component Value Date    WBC 9.26 2024    HGB 7.1 (L) 2024    HCT 23.1 (L) 2024    MCV 82 2024     2024     No results for input(s): "TSH", "FREET4" in the last 168 hours.  Lab Results   Component Value Date    HGBA1C >14.0 (H) 2024       Nutritional status:   Body mass index is 39.13 kg/m².  Lab Results   Component Value Date    ALBUMIN 1.5 (L) 2024    ALBUMIN 1.6 (L) 2024    ALBUMIN 1.7 (L) 2024     Lab Results   Component Value Date    PREALBUMIN 17 (L) 2014    PREALBUMIN 16 (L) 2014    PREALBUMIN 19 (L) 2014       Estimated Creatinine Clearance: 58.4 mL/min (based on SCr of 1.4 mg/dL).    Accu-Checks  Recent Labs     24  1330 24  1525 24  2141 24  0222 24  0823 24  1243 24  1632 24  2154 24  0159 24  0837   POCTGLUCOSE 223* 232* 107 203* 194* 206* 228* 311* 279* 196*       Current Medications and/or Treatments Impacting Glycemic Control  Immunotherapy:    Immunosuppressants       None          Steroids:   Hormones (From " admission, onward)      Start     Stop Route Frequency Ordered    03/28/24 1128  melatonin tablet 6 mg         -- Oral Nightly PRN 03/28/24 1028          Pressors:    Autonomic Drugs (From admission, onward)      None          Hyperglycemia/Diabetes Medications:   Antihyperglycemics (From admission, onward)      Start     Stop Route Frequency Ordered    03/31/24 1130  insulin aspart U-100 pen 6-8 Units         -- SubQ 3 times daily with meals 03/31/24 0845    03/31/24 0915  insulin aspart U-100 pen 6-8 Units         -- SubQ Once 03/31/24 0846    03/31/24 0900  insulin detemir U-100 (Levemir) pen 17 Units         -- SubQ 2 times daily 03/31/24 0845    03/29/24 2346  insulin aspart U-100 pen 0-10 Units         -- SubQ Before meals, nightly and at 0200 PRN 03/29/24 6511

## 2024-03-31 NOTE — ASSESSMENT & PLAN NOTE
Resolved    Patient's FSGs are uncontrolled due to hyperglycemia on current medication regimen.  Last A1c reviewed-   Lab Results   Component Value Date    HGBA1C >14.0 (H) 03/05/2024     Most recent fingerstick glucose reviewed-   Recent Labs   Lab 03/30/24  2154 03/31/24  0159 03/31/24  0837 03/31/24  1231   POCTGLUCOSE 311* 279* 196* 145*       Current correctional scale   LD SSI  Maintain anti-hyperglycemic dose as follows-   Antihyperglycemics (From admission, onward)    Start     Stop Route Frequency Ordered    03/31/24 1130  insulin aspart U-100 pen 6-8 Units         -- SubQ 3 times daily with meals 03/31/24 0845    03/31/24 0900  insulin detemir U-100 (Levemir) pen 17 Units         -- SubQ 2 times daily 03/31/24 0845    03/29/24 2346  insulin aspart U-100 pen 0-10 Units         -- SubQ Before meals, nightly and at 0200 PRN 03/29/24 2247        Hold Oral hypoglycemics while patient is in the hospital.  Will keep patient on DKA pathway with insulin drip and fluid with protocol in place for switching to subcutaneous insulin as anion gap closes.      DKA    - Resolved  - Continue insulin regimen and BG checks TIDWM and QHS and 2am  - patient tends to become hypoglycemic around dinnertime  - could be insulin stacking from breakfast and lunch insulin aspart given poor PO intake despite assistance with feeding   - detemir 14 unit BID  - aspart scheduled w/ only breakfast and lunch and dinner with different units  -Inpatient consult to Endocrine
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
## 2024-04-01 PROBLEM — J18.9 HAP (HOSPITAL-ACQUIRED PNEUMONIA): Status: RESOLVED | Noted: 2024-04-01 | Resolved: 2024-04-01

## 2024-04-01 PROBLEM — R50.9 FEVER: Status: ACTIVE | Noted: 2024-04-01
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
## 2024-04-01 PROBLEM — J18.9 HAP (HOSPITAL-ACQUIRED PNEUMONIA): Status: ACTIVE | Noted: 2024-04-01

## 2024-04-01 LAB
ALBUMIN SERPL BCP-MCNC: 1.4 G/DL (ref 3.5–5.2)
ALP SERPL-CCNC: 148 U/L (ref 55–135)
ALT SERPL W/O P-5'-P-CCNC: 20 U/L (ref 10–44)
ANION GAP SERPL CALC-SCNC: 8 MMOL/L (ref 8–16)
AST SERPL-CCNC: 56 U/L (ref 10–40)
BASOPHILS # BLD AUTO: 0.04 K/UL (ref 0–0.2)
BASOPHILS NFR BLD: 0.4 % (ref 0–1.9)
BILIRUB SERPL-MCNC: 0.2 MG/DL (ref 0.1–1)
BUN SERPL-MCNC: 21 MG/DL (ref 8–23)
CALCIUM SERPL-MCNC: 8.6 MG/DL (ref 8.7–10.5)
CHLORIDE SERPL-SCNC: 110 MMOL/L (ref 95–110)
CO2 SERPL-SCNC: 27 MMOL/L (ref 23–29)
CREAT SERPL-MCNC: 1 MG/DL (ref 0.5–1.4)
DIFFERENTIAL METHOD BLD: ABNORMAL
EOSINOPHIL # BLD AUTO: 0.3 K/UL (ref 0–0.5)
EOSINOPHIL NFR BLD: 2.8 % (ref 0–8)
ERYTHROCYTE [DISTWIDTH] IN BLOOD BY AUTOMATED COUNT: 15.9 % (ref 11.5–14.5)
EST. GFR  (NO RACE VARIABLE): >60 ML/MIN/1.73 M^2
GLUCOSE SERPL-MCNC: 131 MG/DL (ref 70–110)
HCT VFR BLD AUTO: 27.5 % (ref 40–54)
HGB BLD-MCNC: 8.6 G/DL (ref 14–18)
IMM GRANULOCYTES # BLD AUTO: 0.05 K/UL (ref 0–0.04)
IMM GRANULOCYTES NFR BLD AUTO: 0.6 % (ref 0–0.5)
LYMPHOCYTES # BLD AUTO: 1.3 K/UL (ref 1–4.8)
LYMPHOCYTES NFR BLD: 14.5 % (ref 18–48)
MAGNESIUM SERPL-MCNC: 1.9 MG/DL (ref 1.6–2.6)
MCH RBC QN AUTO: 25.6 PG (ref 27–31)
MCHC RBC AUTO-ENTMCNC: 31.3 G/DL (ref 32–36)
MCV RBC AUTO: 82 FL (ref 82–98)
MONOCYTES # BLD AUTO: 0.7 K/UL (ref 0.3–1)
MONOCYTES NFR BLD: 7.4 % (ref 4–15)
NEUTROPHILS # BLD AUTO: 6.7 K/UL (ref 1.8–7.7)
NEUTROPHILS NFR BLD: 74.3 % (ref 38–73)
NRBC BLD-RTO: 0 /100 WBC
PLATELET # BLD AUTO: 533 K/UL (ref 150–450)
PMV BLD AUTO: 9.2 FL (ref 9.2–12.9)
POCT GLUCOSE: 126 MG/DL (ref 70–110)
POCT GLUCOSE: 134 MG/DL (ref 70–110)
POCT GLUCOSE: 142 MG/DL (ref 70–110)
POCT GLUCOSE: 172 MG/DL (ref 70–110)
POCT GLUCOSE: 179 MG/DL (ref 70–110)
POTASSIUM SERPL-SCNC: 3.7 MMOL/L (ref 3.5–5.1)
PROCALCITONIN SERPL IA-MCNC: 0.11 NG/ML
PROT SERPL-MCNC: 6.5 G/DL (ref 6–8.4)
RBC # BLD AUTO: 3.36 M/UL (ref 4.6–6.2)
SODIUM SERPL-SCNC: 145 MMOL/L (ref 136–145)
WBC # BLD AUTO: 9.06 K/UL (ref 3.9–12.7)

## 2024-04-01 PROCEDURE — 25000003 PHARM REV CODE 250

## 2024-04-01 PROCEDURE — A4216 STERILE WATER/SALINE, 10 ML: HCPCS

## 2024-04-01 PROCEDURE — 63600175 PHARM REV CODE 636 W HCPCS

## 2024-04-01 PROCEDURE — 85025 COMPLETE CBC W/AUTO DIFF WBC: CPT

## 2024-04-01 PROCEDURE — 20600001 HC STEP DOWN PRIVATE ROOM

## 2024-04-01 PROCEDURE — 63600175 PHARM REV CODE 636 W HCPCS: Performed by: STUDENT IN AN ORGANIZED HEALTH CARE EDUCATION/TRAINING PROGRAM

## 2024-04-01 PROCEDURE — 80053 COMPREHEN METABOLIC PANEL: CPT

## 2024-04-01 PROCEDURE — 87040 BLOOD CULTURE FOR BACTERIA: CPT

## 2024-04-01 PROCEDURE — 27000207 HC ISOLATION

## 2024-04-01 PROCEDURE — 25000003 PHARM REV CODE 250: Performed by: STUDENT IN AN ORGANIZED HEALTH CARE EDUCATION/TRAINING PROGRAM

## 2024-04-01 PROCEDURE — 84145 PROCALCITONIN (PCT): CPT

## 2024-04-01 PROCEDURE — 99232 SBSQ HOSP IP/OBS MODERATE 35: CPT | Mod: ,,,

## 2024-04-01 PROCEDURE — 36415 COLL VENOUS BLD VENIPUNCTURE: CPT | Mod: XB

## 2024-04-01 PROCEDURE — 83735 ASSAY OF MAGNESIUM: CPT

## 2024-04-01 RX ORDER — ACETAMINOPHEN 325 MG/1
650 TABLET ORAL EVERY 4 HOURS PRN
Status: DISCONTINUED | OUTPATIENT
Start: 2024-04-01 | End: 2024-04-05 | Stop reason: HOSPADM

## 2024-04-01 RX ORDER — POTASSIUM CHLORIDE 750 MG/1
30 CAPSULE, EXTENDED RELEASE ORAL ONCE
Status: COMPLETED | OUTPATIENT
Start: 2024-04-01 | End: 2024-04-01

## 2024-04-01 RX ADMIN — INSULIN ASPART 6 UNITS: 100 INJECTION, SOLUTION INTRAVENOUS; SUBCUTANEOUS at 05:04

## 2024-04-01 RX ADMIN — ACETAMINOPHEN 650 MG: 325 TABLET ORAL at 04:04

## 2024-04-01 RX ADMIN — ALUMINUM HYDROXIDE, MAGNESIUM HYDROXIDE, AND SIMETHICONE 30 ML: 1200; 120; 1200 SUSPENSION ORAL at 12:04

## 2024-04-01 RX ADMIN — CEFEPIME 2 G: 2 INJECTION, POWDER, FOR SOLUTION INTRAVENOUS at 01:04

## 2024-04-01 RX ADMIN — ALUMINUM HYDROXIDE, MAGNESIUM HYDROXIDE, AND SIMETHICONE 30 ML: 1200; 120; 1200 SUSPENSION ORAL at 05:04

## 2024-04-01 RX ADMIN — CEFEPIME 2 G: 2 INJECTION, POWDER, FOR SOLUTION INTRAVENOUS at 11:04

## 2024-04-01 RX ADMIN — VANCOMYCIN HYDROCHLORIDE 2000 MG: 500 INJECTION, POWDER, LYOPHILIZED, FOR SOLUTION INTRAVENOUS at 01:04

## 2024-04-01 RX ADMIN — OXYCODONE 5 MG: 5 TABLET ORAL at 05:04

## 2024-04-01 RX ADMIN — GABAPENTIN 300 MG: 300 CAPSULE ORAL at 09:04

## 2024-04-01 RX ADMIN — ACETAMINOPHEN 650 MG: 325 TABLET ORAL at 05:04

## 2024-04-01 RX ADMIN — POTASSIUM CHLORIDE 30 MEQ: 10 CAPSULE, COATED, EXTENDED RELEASE ORAL at 05:04

## 2024-04-01 RX ADMIN — GABAPENTIN 300 MG: 300 CAPSULE ORAL at 08:04

## 2024-04-01 RX ADMIN — CLOPIDOGREL BISULFATE 75 MG: 75 TABLET ORAL at 08:04

## 2024-04-01 RX ADMIN — INSULIN ASPART 6 UNITS: 100 INJECTION, SOLUTION INTRAVENOUS; SUBCUTANEOUS at 12:04

## 2024-04-01 RX ADMIN — MUPIROCIN: 20 OINTMENT TOPICAL at 08:04

## 2024-04-01 RX ADMIN — PANTOPRAZOLE SODIUM 40 MG: 40 TABLET, DELAYED RELEASE ORAL at 08:04

## 2024-04-01 RX ADMIN — TRAZODONE HYDROCHLORIDE 25 MG: 50 TABLET ORAL at 09:04

## 2024-04-01 RX ADMIN — MUPIROCIN: 20 OINTMENT TOPICAL at 09:04

## 2024-04-01 RX ADMIN — Medication 10 ML: at 12:04

## 2024-04-01 RX ADMIN — NIFEDIPINE 90 MG: 30 TABLET, FILM COATED, EXTENDED RELEASE ORAL at 08:04

## 2024-04-01 RX ADMIN — Medication 10 ML: at 06:04

## 2024-04-01 RX ADMIN — APIXABAN 5 MG: 5 TABLET, FILM COATED ORAL at 09:04

## 2024-04-01 RX ADMIN — OXYCODONE 5 MG: 5 TABLET ORAL at 08:04

## 2024-04-01 RX ADMIN — APIXABAN 5 MG: 5 TABLET, FILM COATED ORAL at 08:04

## 2024-04-01 RX ADMIN — Medication 10 ML: at 05:04

## 2024-04-01 RX ADMIN — ACETAMINOPHEN 650 MG: 325 TABLET ORAL at 08:04

## 2024-04-01 RX ADMIN — INSULIN ASPART 6 UNITS: 100 INJECTION, SOLUTION INTRAVENOUS; SUBCUTANEOUS at 08:04

## 2024-04-01 RX ADMIN — ERTAPENEM 1 G: 1 INJECTION INTRAMUSCULAR; INTRAVENOUS at 06:04

## 2024-04-01 RX ADMIN — INSULIN ASPART 2 UNITS: 100 INJECTION, SOLUTION INTRAVENOUS; SUBCUTANEOUS at 12:04

## 2024-04-01 RX ADMIN — COLLAGENASE SANTYL: 250 OINTMENT TOPICAL at 08:04

## 2024-04-01 RX ADMIN — TAMSULOSIN HYDROCHLORIDE 0.4 MG: 0.4 CAPSULE ORAL at 08:04

## 2024-04-01 RX ADMIN — ATORVASTATIN CALCIUM 40 MG: 40 TABLET, FILM COATED ORAL at 08:04

## 2024-04-01 RX ADMIN — ACETAMINOPHEN 650 MG: 325 TABLET ORAL at 12:04

## 2024-04-01 RX ADMIN — ALUMINUM HYDROXIDE, MAGNESIUM HYDROXIDE, AND SIMETHICONE 30 ML: 1200; 120; 1200 SUSPENSION ORAL at 08:04

## 2024-04-01 NOTE — SUBJECTIVE & OBJECTIVE
Interval History: fevered to 100.6 overnight, received one dose ertapenem. Blood cx drawn. CXR with mid right side opacity. Started on vanc/cefepime. MRSA PCR, RIP, resp cx ordered. UA ordered. Flaquita Brewer Patient is on room air and denies subjective fever, SOB, chills and states he will try to void. WBC normal, plts trending up.     Review of Systems   Constitutional:  Negative for chills and fever.   Respiratory:  Negative for chest tightness and shortness of breath.    Genitourinary:  Negative for dysuria.     Objective:     Vital Signs (Most Recent):  Temp: 99.2 °F (37.3 °C) (04/01/24 1200)  Pulse: 63 (04/01/24 1200)  Resp: 16 (04/01/24 0853)  BP: 126/84 (04/01/24 1200)  SpO2: 96 % (04/01/24 0745) Vital Signs (24h Range):  Temp:  [98.7 °F (37.1 °C)-100.6 °F (38.1 °C)] 99.2 °F (37.3 °C)  Pulse:  [59-72] 63  Resp:  [16-22] 16  SpO2:  [94 %-100 %] 96 %  BP: (126-162)/(54-84) 126/84     Weight: 119.7 kg (264 lb)  Body mass index is 38.99 kg/m².    Intake/Output Summary (Last 24 hours) at 4/1/2024 1400  Last data filed at 4/1/2024 0609  Gross per 24 hour   Intake 600 ml   Output 2250 ml   Net -1650 ml         Physical Exam  Constitutional:       General: He is not in acute distress.     Appearance: Normal appearance. He is well-developed. Ill appearance: chronically ill appearing.   HENT:      Head: Normocephalic and atraumatic.      Right Ear: External ear normal.      Left Ear: External ear normal.      Nose: Nose normal.   Eyes:      General:         Right eye: No discharge.         Left eye: No discharge.      Conjunctiva/sclera: Conjunctivae normal.   Cardiovascular:      Rate and Rhythm: Normal rate and regular rhythm.      Pulses: Normal pulses.   Pulmonary:      Effort: Pulmonary effort is normal. No respiratory distress.      Breath sounds: No stridor.   Abdominal:      General: Abdomen is flat.      Palpations: Abdomen is soft.      Tenderness: There is no abdominal tenderness.   Musculoskeletal:          "General: Swelling and deformity present.      Cervical back: Normal range of motion.      Comments: S/p L-AKA, bandage CDI  RUE swelling d/t DVT  LUE swelling noted 3/31   Skin:     General: Skin is warm and dry.   Neurological:      General: No focal deficit present.      Mental Status: He is alert.             Significant Labs: All pertinent labs within the past 24 hours have been reviewed.  Blood Culture: No results for input(s): "LABBLOO" in the last 48 hours.  CBC:   Recent Labs   Lab 03/31/24  0953 03/31/24 2052 04/01/24  1255   WBC 8.76 10.13 9.06   HGB 6.8* 8.0* 8.6*   HCT 22.7* 25.1* 27.5*   * 458* 533*     CMP:   Recent Labs   Lab 03/31/24  0953 04/01/24  1254    145   K 4.1 3.7    110   CO2 25 27   * 131*   BUN 28* 21   CREATININE 1.2 1.0   CALCIUM 8.1* 8.6*   PROT 6.3 6.5   ALBUMIN 1.4* 1.4*   BILITOT 0.2 0.2   ALKPHOS 176* 148*   AST 29 56*   ALT 11 20   ANIONGAP 7* 8     Magnesium:   Recent Labs   Lab 03/31/24  0953 04/01/24  1254   MG 2.0 1.9     POCT Glucose:   Recent Labs   Lab 04/01/24  0222 04/01/24  0744 04/01/24  1157   POCTGLUCOSE 126* 142* 179*     Urine Studies: No results for input(s): "COLORU", "APPEARANCEUA", "PHUR", "SPECGRAV", "PROTEINUA", "GLUCUA", "KETONESU", "BILIRUBINUA", "OCCULTUA", "NITRITE", "UROBILINOGEN", "LEUKOCYTESUR", "RBCUA", "WBCUA", "BACTERIA", "SQUAMEPITHEL", "HYALINECASTS" in the last 48 hours.    Invalid input(s): "WRIGHTSUR"    Significant Imaging: I have reviewed all pertinent imaging results/findings within the past 24 hours.  "

## 2024-04-01 NOTE — ASSESSMENT & PLAN NOTE
Patient developed new urinary retention.     -Fung's placed by urology - Dc'd on 4/1. Voiding trial  -Flomax  -Renal ultrasound showed no hydronephrosis  - Making adequate urine with fung, c/f hematuria which developed 3/25 and resolved with repositioning of fung. See urology note from same date  - voiding trial prior to DC. If unable to void, urology f/u outpatient

## 2024-04-01 NOTE — ASSESSMENT & PLAN NOTE
- eliquis transitioned to Heparin which was paused temporarily d/t concern of new hematuria which developed 3/25. Discussed with urology, the hematuria resolved with repositioning of the fung. Heparin resumed, but will temporarily be paused as patient needs preoperative blood transfusion and has minimal IV access d/t placement difficulties & limb alert on R-arm d/t DVT    - resumed eliquis 3/30     - 3/31 new LUE swelling c/f DVT, US bilateral negative for new DVT - continue eliquis 5 BID

## 2024-04-01 NOTE — PLAN OF CARE
04/01/24 1406   Post-Acute Status   Post-Acute Authorization Placement  (SNF)   Post-Acute Placement Status Referrals Sent   Discharge Delays None known at this time   Discharge Plan   Discharge Plan A Skilled Nursing Facility   Discharge Plan B Skilled Nursing Facility;Home with family;Home Health  (TBD)     QUINCY met w/ pt niabel Jones [phone] at bedside to discuss pt d/c plan. SW informed Karen pt referrals was sent out prior to surgery and SW able to resend with updated information.   QUINCY met w/ pt niabel Jones [phone] to review discharge recommendation of SNF and is agreeable to plan    Patient/family is open to providers in [facility] or [place], [place], however pt niabel lives in [place], [place] and if a provider in [place] accept pt Karen understand that would be last result.     Notified that referral sent to below listed facilities from in-network list based on proximity to home/family support:   [facility] or [place], [place] facilities   2.  3.  4.  5. (can send more than 5)    Patient/family instructed to identify preference.    Preferred Facility: (if more than 1, listed in order of descending preference)  [facility] or [place], [place] facilities     2:05pm: (Pt was accepted at [facility] prior to surgery) QUINCY contacted [facility] [phone]; QUINCY left  to get an update on pt updated referral information. *[facility] called back and stated pt has been accepted.     If an additional preferred facility not listed above is identified, additional referral to be sent. If above facilities unable to accept, will send additional referrals to in-network providers.     Discharge Plan A and Plan B have been determined by review of patient's clinical status, future medical and therapeutic needs, and coverage/benefits for post-acute care in coordination with multidisciplinary team members.    Viviane Ray, AMAYA   [facility]    Case Management Dept  [phone]      Pulmonology

## 2024-04-01 NOTE — ASSESSMENT & PLAN NOTE
Chronic, uncontrolled. Latest blood pressure and vitals reviewed-     Temp:  [98.7 °F (37.1 °C)-100.6 °F (38.1 °C)]   Pulse:  [59-72]   Resp:  [16-22]   BP: (126-162)/(54-84)   SpO2:  [94 %-100 %] .   Home meds for hypertension were reviewed and noted below.   Hypertension Medications               furosemide (LASIX) 40 MG tablet Take 20 mg by mouth.    hydrALAZINE (APRESOLINE) 100 MG tablet Take 1 tablet (100 mg total) by mouth every 8 (eight) hours.    isosorbide dinitrate (ISORDIL) 10 MG tablet Take 1 tablet (10 mg total) by mouth 3 (three) times daily.    NIFEdipine (PROCARDIA-XL) 60 MG (OSM) 24 hr tablet Take 1 tablet (60 mg total) by mouth once daily.            While in the hospital, will manage blood pressure as follows; Adjust home antihypertensive regimen as follows- will keep nifedipine - increased to 90     Will utilize p.r.n. blood pressure medication only if patient's blood pressure greater than 180/110 and he develops symptoms such as worsening chest pain or shortness of breath.

## 2024-04-01 NOTE — PROGRESS NOTES
[facility] - Telemetry Stepdown  Endocrinology  Progress Note    Admit Date: 3/5/2024     Reason for Consult: Management of T2DM, Hyperglycemia     Diabetes diagnosis year: > 25 years    Home Diabetes Medications:  Novolog 6 units w/ meals and Lantus 45 units nightly (states he is not taking).     How often checking glucose at home?  Not really checking    BG readings on regimen: 200's  Hypoglycemia on the regimen?  Yes (When he was taking the Lantus)  Missed doses on regimen?  Yes    Diabetes Complications include:     Hyperglycemia, Hypoglycemia , Diabetic chronic kidney disease     , Diabetic dermatitis, Foot ulcer  , and Periodontal disease    Complicating diabetes co morbidities:   HTN; HLD      HPI: Saji Castañeda is a 75 yo M with a history of Afib, T2D, HLD, HTN, chronic osteomyelitis of L heel, s/p L TMA, PID, ESBL Klebsiella and Acinetobacter bacteremia who was admitted after being found down at home and DKA. Endocrine consulted to manage hyperglycemia and type 2 diabetes.   Hemoglobin A1C   Date Value Ref Range Status   03/05/2024 >14.0 (H) 4.0 - 5.6 % Final     Comment:     ADA Screening Guidelines:  5.7-6.4%  Consistent with prediabetes  >or=6.5%  Consistent with diabetes    High levels of fetal hemoglobin interfere with the HbA1C  assay. Heterozygous hemoglobin variants (HbS, HgC, etc)do  not significantly interfere with this assay.   However, presence of multiple variants may affect accuracy.     09/13/2023 11.0 (H) 4.5 - 5.7 % Final   03/10/2023 8.4 (H) 4.0 - 5.6 % Final     Comment:     ADA Screening Guidelines:  5.7-6.4%  Consistent with prediabetes  >or=6.5%  Consistent with diabetes    High levels of fetal hemoglobin interfere with the HbA1C  assay. Heterozygous hemoglobin variants (HbS, HgC, etc)do  not significantly interfere with this assay.   However, presence of multiple variants may affect accuracy.     09/17/2022 9.9 (H) 4.0 - 5.6 % Final     Comment:     ADA Screening Guidelines:  5.7-6.4%   "Consistent with prediabetes  >or=6.5%  Consistent with diabetes    High levels of fetal hemoglobin interfere with the HbA1C  assay. Heterozygous hemoglobin variants (HbS, HgC, etc)do  not significantly interfere with this assay.   However, presence of multiple variants may affect accuracy.              Interval HPI:   No acute events overnight. Patient in room 8078/8078 A. Blood glucose stable. BG at goal on current insulin regimen (SSI, prandial, and basal insulin ). Steroid use- None.   5 Days Post-Op  Renal function- Normal   Vasopressors-  None     Diet diabetic  Calorie     Eatin%  Nausea: No  Hypoglycemia and intervention: No  Fever: No  TPN and/or TF: No      BP (!) 162/71 (Patient Position: Lying)   Pulse 66   Temp 99.1 °F (37.3 °C) (Oral)   Resp (!) 22   Ht 5' 9" (1.753 m)   Wt 119.7 kg (264 lb)   SpO2 96%   BMI 38.99 kg/m²     Labs Reviewed and Include    Recent Labs   Lab 24  0953   *   CALCIUM 8.1*   ALBUMIN 1.4*   PROT 6.3      K 4.1   CO2 25      BUN 28*   CREATININE 1.2   ALKPHOS 176*   ALT 11   AST 29   BILITOT 0.2     Lab Results   Component Value Date    WBC 10.13 2024    HGB 8.0 (L) 2024    HCT 25.1 (L) 2024    MCV 82 2024     (H) 2024     No results for input(s): "TSH", "FREET4" in the last 168 hours.  Lab Results   Component Value Date    HGBA1C >14.0 (H) 2024       Nutritional status:   Body mass index is 38.99 kg/m².  Lab Results   Component Value Date    ALBUMIN 1.4 (L) 2024    ALBUMIN 1.5 (L) 2024    ALBUMIN 1.6 (L) 2024     Lab Results   Component Value Date    PREALBUMIN 17 (L) 2014    PREALBUMIN 16 (L) 2014    PREALBUMIN 19 (L) 2014       Estimated Creatinine Clearance: 67.9 mL/min (based on SCr of 1.2 mg/dL).    Accu-Checks  Recent Labs     24  1243 24  1632 24  2154 24  0159 24  0837 24  1231 24  1637 24 " 04/01/24  0222 04/01/24  0744   POCTGLUCOSE 206* 228* 311* 279* 196* 145* 154* 203* 126* 142*       Current Medications and/or Treatments Impacting Glycemic Control  Immunotherapy:    Immunosuppressants       None          Steroids:   Hormones (From admission, onward)      Start     Stop Route Frequency Ordered    03/28/24 1128  melatonin tablet 6 mg         -- Oral Nightly PRN 03/28/24 1028          Pressors:    Autonomic Drugs (From admission, onward)      None          Hyperglycemia/Diabetes Medications:   Antihyperglycemics (From admission, onward)      Start     Stop Route Frequency Ordered    03/31/24 1130  insulin aspart U-100 pen 6-8 Units         -- SubQ 3 times daily with meals 03/31/24 0845    03/31/24 0900  insulin detemir U-100 (Levemir) pen 17 Units         -- SubQ 2 times daily 03/31/24 0845    03/29/24 2346  insulin aspart U-100 pen 0-10 Units         -- SubQ Before meals, nightly and at 0200 PRN 03/29/24 2247            ASSESSMENT and PLAN    Renal/  Stage 3b chronic kidney disease  Titrate insulin slowly to avoid hypoglycemia as the risk of hypoglycemia increases with decreased creatinine clearance.        ID  * Osteomyelitis of left lower extremity  Managed per primary team  Optimize BG control to improve wound healing        Endocrine  Insulin dependent type 2 diabetes mellitus  BG goal: 140-180    - Levemir (Insulin Detemir) 17 units BID   - Novolog (Insulin Aspart) 6-8 units with meals (Administer    6    units if patient eats 25-50% of meal, administer    8    units if patient eats > 50% of meal.) and prn for BG excursions [facility] SSI (150/25)  - BG checks AC/HS/0200   - Hypoglycemia protocol in place     ** Please notify Endocrine for any change and/or advance in diet**  ** Please call Endocrine for any BG related issues **     Discharge Planning:   TBD. Please notify endocrinology prior to discharge.        Missy Yadav PA-C  Endocrinology  [facility] - Tele[facility] Step[facility]

## 2024-04-01 NOTE — ANESTHESIA POSTPROCEDURE EVALUATION
Anesthesia Post Evaluation    Patient: Saji Castañeda    Procedure(s) Performed: Procedure(s) (LRB):  AMPUTATION, ABOVE KNEE (Left)    Final Anesthesia Type: general      Patient location during evaluation: PACU  Patient participation: Yes- Able to Participate  Level of consciousness: awake  Post-procedure vital signs: reviewed and stable  Pain management: adequate  Airway patency: patent    PONV status at discharge: No PONV  Anesthetic complications: no      Cardiovascular status: blood pressure returned to baseline  Respiratory status: unassisted  Hydration status: euvolemic  Follow-up not needed.              Vitals Value Taken Time   /71 04/01/24 0745   Temp 37.3 °C (99.1 °F) 04/01/24 0745   Pulse 64 04/01/24 0944   Resp 26 04/01/24 0904   SpO2 100 % 04/01/24 0944   Vitals shown include unvalidated device data.      Event Time   Out of Recovery 03/27/2024 13:15:00         Pain/Wing Score: Pain Rating Prior to Med Admin: 5 (4/1/2024  8:53 AM)  Pain Rating Post Med Admin: 0 (4/1/2024  5:23 AM)

## 2024-04-01 NOTE — ASSESSMENT & PLAN NOTE
BG goal: 140-180    - Levemir (Insulin Detemir) 17 units BID   - Novolog (Insulin Aspart) 6-8 units with meals (Administer    6    units if patient eats 25-50% of meal, administer    8    units if patient eats > 50% of meal.) and prn for BG excursions [facility] SSI (150/25)  - BG checks AC/HS/0200   - Hypoglycemia protocol in place     ** Please notify Endocrine for any change and/or advance in diet**  ** Please call Endocrine for any BG related issues **     Discharge Planning:   TBD. Please notify endocrinology prior to discharge.

## 2024-04-01 NOTE — ASSESSMENT & PLAN NOTE
Pt with developing extremity edema, partially d/t known RUE DVT which doesn't explain the LUE/RLE edema. Will trial diuresis, obtain echo, and DVT US for new LUE swelling which was negative.     - now on RA  - diurese as needed

## 2024-04-01 NOTE — SUBJECTIVE & OBJECTIVE
"Interval HPI:   No acute events overnight. Patient in room 8078/8078 A. Blood glucose stable. BG at goal on current insulin regimen (SSI, prandial, and basal insulin ). Steroid use- None.   5 Days Post-Op  Renal function- Normal   Vasopressors-  None     Diet diabetic  Calorie     Eatin%  Nausea: No  Hypoglycemia and intervention: No  Fever: No  TPN and/or TF: No      BP (!) 162/71 (Patient Position: Lying)   Pulse 66   Temp 99.1 °F (37.3 °C) (Oral)   Resp (!) 22   Ht 5' 9" (1.753 m)   Wt 119.7 kg (264 lb)   SpO2 96%   BMI 38.99 kg/m²     Labs Reviewed and Include    Recent Labs   Lab 24  0953   *   CALCIUM 8.1*   ALBUMIN 1.4*   PROT 6.3      K 4.1   CO2 25      BUN 28*   CREATININE 1.2   ALKPHOS 176*   ALT 11   AST 29   BILITOT 0.2     Lab Results   Component Value Date    WBC 10.13 2024    HGB 8.0 (L) 2024    HCT 25.1 (L) 2024    MCV 82 2024     (H) 2024     No results for input(s): "TSH", "FREET4" in the last 168 hours.  Lab Results   Component Value Date    HGBA1C >14.0 (H) 2024       Nutritional status:   Body mass index is 38.99 kg/m².  Lab Results   Component Value Date    ALBUMIN 1.4 (L) 2024    ALBUMIN 1.5 (L) 2024    ALBUMIN 1.6 (L) 2024     Lab Results   Component Value Date    PREALBUMIN 17 (L) 2014    PREALBUMIN 16 (L) 2014    PREALBUMIN 19 (L) 2014       Estimated Creatinine Clearance: 67.9 mL/min (based on SCr of 1.2 mg/dL).    Accu-Checks  Recent Labs     24  1243 24  1632 24  2154 24  0159 24  0837 24  1231 24  1637 03/3124  0222 24  0744   POCTGLUCOSE 206* 228* 311* 279* 196* 145* 154* 203* 126* 142*       Current Medications and/or Treatments Impacting Glycemic Control  Immunotherapy:    Immunosuppressants       None          Steroids:   Hormones (From admission, onward)      Start     Stop Route Frequency Ordered "    03/28/24 1128  melatonin tablet 6 mg         -- Oral Nightly PRN 03/28/24 1028          Pressors:    Autonomic Drugs (From admission, onward)      None          Hyperglycemia/Diabetes Medications:   Antihyperglycemics (From admission, onward)      Start     Stop Route Frequency Ordered    03/31/24 1130  insulin aspart U-100 pen 6-8 Units         -- SubQ 3 times daily with meals 03/31/24 0845    03/31/24 0900  insulin detemir U-100 (Levemir) pen 17 Units         -- SubQ 2 times daily 03/31/24 0845    03/29/24 2346  insulin aspart U-100 pen 0-10 Units         -- SubQ Before meals, nightly and at 0200 PRN 03/29/24 6228

## 2024-04-01 NOTE — CONSULTS
"Pharmacokinetic Initial Assessment: IV Vancomycin    Assessment/Plan:    -Initiate vancomycin 2g IVPB x1 (17 mg/kg, 120 kg, pre AKA weight) followed by vancomycni 1500 mg IVPB Q24H  -Scr stable at 1.2, though previously elevated earlier in admission  -Goal trough 15 - 20 for PNA  -Trough 04/03 @ 1200 prior to 3rd dose    Please contact pharmacy at extension [phone] with any questions regarding this assessment.     Thank you for the consult,   Patel Garza       Patient brief summary:  Saji Castañeda is a 74 y.o. male initiated on antimicrobial therapy with IV Vancomycin for treatment of suspected lower respiratory infection    Drug Allergies:   Review of patient's allergies indicates:  No Known Allergies    Actual Body Weight:   120 kg    Renal Function:   Estimated Creatinine Clearance: 67.9 mL/min (based on SCr of 1.2 mg/dL).,     Dialysis Method (if applicable):  N/A    CBC (last 72 hours):  Recent Labs   Lab Result Units 03/29/24  1331 03/30/24  0802 03/31/24  0953 03/31/24  2052   WBC K/uL 9.87 9.26 8.76 10.13   Hemoglobin g/dL 7.0* 7.1* 6.8* 8.0*   Hematocrit % 23.4* 23.1* 22.7* 25.1*   Platelets K/uL 420 435 482* 458*   Gran % % 75.6* 72.2 73.0 76.4*   Lymph % % 11.8* 14.4* 15.2* 13.0*   Mono % % 8.2 7.9 8.2 7.2   Eosinophil % % 3.7 4.9 2.4 2.5   Basophil % % 0.3 0.2 0.5 0.4   Differential Method  Automated Automated Automated Automated       Metabolic Panel (last 72 hours):  Recent Labs   Lab Result Units 03/30/24  0802 03/31/24  0953   Sodium mmol/L 140 141   Potassium mmol/L 4.3 4.1   Chloride mmol/L 108 109   CO2 mmol/L 24 25   Glucose mg/dL 198* 196*   BUN mg/dL 35* 28*   Creatinine mg/dL 1.4 1.2   Albumin g/dL 1.5* 1.4*   Total Bilirubin mg/dL 0.2 0.2   Alkaline Phosphatase U/L 241* 176*   AST U/L 62* 29   ALT U/L 15 11   Magnesium mg/dL 2.1 2.0       Drug levels (last 3 results):  No results for input(s): "VANCOMYCINRA", "VANCORANDOM", "VANCOMYCINPE", "VANCOPEAK", "VANCOMYCINTR", "VANCOTROUGH" in the " last 72 hours.    Microbiologic Results:  Microbiology Results (last 7 days)       Procedure Component Value Units Date/Time    Respiratory Infection Panel (PCR), Nasopharyngeal [[id number]]     Order Status: No result Specimen: Nasopharyngeal Swab     Culture, Respiratory with Gram Stain [[id number]]     Order Status: No result Specimen: Respiratory     MRSA Screen by PCR [[id number]]     Order Status: No result Specimen: Nasopharyngeal Swab from Nasal     Blood culture [[id number]] Collected: 04/01/24 0611    Order Status: Sent Specimen: Blood Updated: 04/01/24 0634    Narrative:      Collection has been rescheduled by VS1 at 04/01/2024 05:01 Reason:   Patient Refused Nurse did not answer  Collection has been rescheduled by VS1 at 04/01/2024 05:01 Reason:   Patient Refused Nurse did not answer    Blood culture [[id number]]     Order Status: Sent Specimen: Blood

## 2024-04-01 NOTE — ASSESSMENT & PLAN NOTE
Insulin mgmt per endocrine    Patient's FSGs are uncontrolled due to hyperglycemia on current medication regimen.  Last A1c reviewed-   Lab Results   Component Value Date    HGBA1C >14.0 (H) 03/05/2024     Most recent fingerstick glucose reviewed-   Recent Labs   Lab 03/31/24 2051 04/01/24  0222 04/01/24  0744 04/01/24  1157   POCTGLUCOSE 203* 126* 142* 179*       Current correctional scale  Low  Increase anti-hyperglycemic dose as follows-   Antihyperglycemics (From admission, onward)    Start     Stop Route Frequency Ordered    03/31/24 1130  insulin aspart U-100 pen 6-8 Units         -- SubQ 3 times daily with meals 03/31/24 0845    03/31/24 0900  insulin detemir U-100 (Levemir) pen 17 Units         -- SubQ 2 times daily 03/31/24 0845    03/29/24 2346  insulin aspart U-100 pen 0-10 Units         -- SubQ Before meals, nightly and at 0200 PRN 03/29/24 2247        Hold Oral hypoglycemics while patient is in the hospital.    - Endocrine's following an making insulin adjustments as needed.

## 2024-04-01 NOTE — ASSESSMENT & PLAN NOTE
Resolving    Patient with acute kidney injury/acute renal failure likely due to pre-renal azotemia due to dehydration and post-obstructive d/t urinary retention  YNES is currently stable. Baseline creatinine  1.1  - Labs reviewed- Renal function/electrolytes with Estimated Creatinine Clearance: 81.5 mL/min (based on SCr of 1 mg/dL). according to latest data. Monitor urine output and serial BMP and adjust therapy as needed. Avoid nephrotoxins and renally dose meds for GFR listed above.    Creatinine 2.1 on admit, baseline around 1.1  - prerenal vs obstructive    Lab Results   Component Value Date    CREATININE 1.0 04/01/2024       Plan:   - fung out on 4/1, voiding trial  - Strict I&Os and daily weights   - Daily BMP  - Trend sCr  - Avoid nephrotoxic agents such as NSAIDs, ACEi, ARBs and IV radiocontrast.  - Renally dose meds to current GFR.  - Maintain MAP > 65.  - No indications for HD at this time.   - Maintain electrolytes at goal: Mg > 2, Phos > 3, K > 4.

## 2024-04-01 NOTE — ASSESSMENT & PLAN NOTE
Febrile on 4/1 to 100.6. Small right mid lung opacity seen on CXR. Could be HAP vs UTI from Sams vs atelectasis in setting of post of fever after AKA on 3/27. Will trend fever. Not hypotensive. No leukocytosis. New upper extremity DVTs ruled out.     - Blood cx repeated  - Procal pending  - f/u RIP, respiratory culture, UA, MRSA PCR  - Sams removed  - will cover empirically for MRSA, and pseudomonas with vanc/cefepime

## 2024-04-01 NOTE — PROGRESS NOTES
[facility] - Telemetry [facility] Medicine  Progress Note    Patient Name: Saji Castañeda  MRN: [id number]  Patient Class: IP- Inpatient   Admission Date: 3/5/2024  Length of Stay: 27 days  Attending Physician: Jm Rosa MD  Primary Care Provider: [facility] -        Subjective:     Principal Problem:Osteomyelitis of left lower extremity        HPI:  Saji Castañeda is a 74 y.o. male with a medical history of DM2, HLD, HTN, chronic osteomyelitis of L heel, L TMA, PAD, hx of ESBL klebsiella, acinetobacter bacteremia, presenting with hyperglycemia. He presented to the ED via EMS and his POCT glucose on arrival was >500. Serum blood glucose 667 with anion gap of 17 and elevated ketones. Serum potassium 3.4, corrected sodium 149. Urinalysis significant for RBCs, glucosuria, and moderate bacteria and yeast. CBC revealed leukocytosis, elevated platelets, and mild anemia. CMP shows Na 135, K 3.4, BUN 29, Cr 2.1, Glucose 677, Lactate 4.61. BNP and troponin elevated. GFR 32.2. Insulin drip, IV fluids, zosyn, vancomycin, and potassium given.      He is unable to provide much history, but states that he has not been taking his home medications for at least a week. He reports shortness of breath, fatigue, and weakness for the last 6-7 days. He has chills and reports episodes of emesis. He denies abdominal pain, chest pain, palpitations, recent illness/infection, fevers, changes to bowel movements and urinary output. Patient lives with his brother and sister at home. He was last seen in the ED on 2/21 after a fall. He was hypoglycemic BGL 60 at that time which returned to normal after eating. He was also scheduled for a wound clinic appointment today 3/5 at [facility] with Dr. Wilson.        Overview/Hospital Course:  Wound care and cultures collected by Podiatry with recs for Vascular consult for amputation. Patient declined amputation. ID recommending IV Ertapenem for chronic osteomyelitis for 6 week duration  (EED: 4/15/24). With high wound care needs and IV antibiotics plan patient needing long-term placement. He is being treated with topical permethrin for bedbugs. Wound care has been dressing the wounds. 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 Still adjusting insulin to control sugars better. Giving fluids for YNES. Poor PO intake even with feeding assistance. LTAC authorization denied by [employer]. Waiting for SNF placement ([facility], awaiting auth and family to complete paperwork). Patient started spiking fever again and infectious work up done. ID re-consulted. Patient has been counseled multiple times that source control cannot be achieved without amputation. Patient agreeable to AKA scheduled for 3/27. Transitioned to heparin from eliquis. Fung placed for urinary rentention. Discussed new hematuria with urology, hematuria resolved with repositioning of fung. Pt to receive 1u PRBC preoperatively per vascular surgery. S/p L AKA on 3/27 with vascular surgery. ID rec 24-48h post-op abx now that source control is achieved, unless further concerned for infection. Delirium precautions + trazodone added for sleep hygiene, rescheduled lab times appropriately. Resuming heparin drip + plavix 3/29 per vascular surg. 3/30 transitioned to eliquis from heparin gtt. LUE swelling noted 3/31, repeat DVT US of upper extremities were negative for DVT's. Hgb downtrending at 6.8, will receive 1u PRBC, no overt signs of bleeding. Febrile on 4/1 overnight to 100.6, possibly atelectasis or HAP for which he received 1 dose ertapenem. Blood cx collected, CXR showing possible opacity in mid right lung. Started on vanc/cefepime for HAP.     Interval History: fevered to 100.6 overnight, received one dose ertapenem. Blood cx drawn. CXR with mid right side opacity. Started on vanc/cefepime. MRSA PCR, RIP, resp cx ordered. UA ordered. Flaquita Sparks. Patient is on room air and denies  subjective fever, SOB, chills and states he will try to void. WBC normal, plts trending up.     Review of Systems   Constitutional:  Negative for chills and fever.   Respiratory:  Negative for chest tightness and shortness of breath.    Genitourinary:  Negative for dysuria.     Objective:     Vital Signs (Most Recent):  Temp: 99.2 °F (37.3 °C) (04/01/24 1200)  Pulse: 63 (04/01/24 1200)  Resp: 16 (04/01/24 0853)  BP: 126/84 (04/01/24 1200)  SpO2: 96 % (04/01/24 0745) Vital Signs (24h Range):  Temp:  [98.7 °F (37.1 °C)-100.6 °F (38.1 °C)] 99.2 °F (37.3 °C)  Pulse:  [59-72] 63  Resp:  [16-22] 16  SpO2:  [94 %-100 %] 96 %  BP: (126-162)/(54-84) 126/84     Weight: 119.7 kg (264 lb)  Body mass index is 38.99 kg/m².    Intake/Output Summary (Last 24 hours) at 4/1/2024 1400  Last data filed at 4/1/2024 0609  Gross per 24 hour   Intake 600 ml   Output 2250 ml   Net -1650 ml         Physical Exam  Constitutional:       General: He is not in acute distress.     Appearance: Normal appearance. He is well-developed. Ill appearance: chronically ill appearing.   HENT:      Head: Normocephalic and atraumatic.      Right Ear: External ear normal.      Left Ear: External ear normal.      Nose: Nose normal.   Eyes:      General:         Right eye: No discharge.         Left eye: No discharge.      Conjunctiva/sclera: Conjunctivae normal.   Cardiovascular:      Rate and Rhythm: Normal rate and regular rhythm.      Pulses: Normal pulses.   Pulmonary:      Effort: Pulmonary effort is normal. No respiratory distress.      Breath sounds: No stridor.   Abdominal:      General: Abdomen is flat.      Palpations: Abdomen is soft.      Tenderness: There is no abdominal tenderness.   Musculoskeletal:         General: Swelling and deformity present.      Cervical back: Normal range of motion.      Comments: S/p L-AKA, bandage CDI  RUE swelling d/t DVT  LUE swelling noted 3/31   Skin:     General: Skin is warm and dry.   Neurological:      General:  "No focal deficit present.      Mental Status: He is alert.             Significant Labs: All pertinent labs within the past 24 hours have been reviewed.  Blood Culture: No results for input(s): "LABBLOO" in the last 48 hours.  CBC:   Recent Labs   Lab 03/31/24  0953 03/31/24  2052 04/01/24  1255   WBC 8.76 10.13 9.06   HGB 6.8* 8.0* 8.6*   HCT 22.7* 25.1* 27.5*   * 458* 533*     CMP:   Recent Labs   Lab 03/31/24  0953 04/01/24  1254    145   K 4.1 3.7    110   CO2 25 27   * 131*   BUN 28* 21   CREATININE 1.2 1.0   CALCIUM 8.1* 8.6*   PROT 6.3 6.5   ALBUMIN 1.4* 1.4*   BILITOT 0.2 0.2   ALKPHOS 176* 148*   AST 29 56*   ALT 11 20   ANIONGAP 7* 8     Magnesium:   Recent Labs   Lab 03/31/24  0953 04/01/24  1254   MG 2.0 1.9     POCT Glucose:   Recent Labs   Lab 04/01/24  0222 04/01/24  0744 04/01/24  1157   POCTGLUCOSE 126* 142* 179*     Urine Studies: No results for input(s): "COLORU", "APPEARANCEUA", "PHUR", "SPECGRAV", "PROTEINUA", "GLUCUA", "KETONESU", "BILIRUBINUA", "OCCULTUA", "NITRITE", "UROBILINOGEN", "LEUKOCYTESUR", "RBCUA", "WBCUA", "BACTERIA", "SQUAMEPITHEL", "HYALINECASTS" in the last 48 hours.    Invalid input(s): "WRIGHTSUR"    Significant Imaging: I have reviewed all pertinent imaging results/findings within the past 24 hours.    Assessment/Plan:      * Osteomyelitis of left lower extremity  Resolved w/ source control, d/c abx today    Patient with Proteus and enterobacter on leg cultures collected by Podiatry with concern for acute on chronic osteo. Fungal culture grew fusarium. He has started spiking fever and still is not agreeable to amputation, will do infectious work up to look out for source.    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 Can " discontinue antimicrobials 24-48 hours after surgery as long term treatment is not indicated.       Fever  Febrile on 4/1 to 100.6. Small right mid lung opacity seen on CXR. Could be HAP vs UTI from Fung vs atelectasis in setting of post of fever after AKA on 3/27. Will trend fever. Not hypotensive. No leukocytosis. New upper extremity DVTs ruled out.     - Blood cx repeated  - Procal pending  - f/u RIP, respiratory culture, UA, MRSA PCR  - Fung removed  - will cover empirically for MRSA, and pseudomonas with vanc/cefepime       Edema due to hypervolemia  Pt with developing extremity edema, partially d/t known RUE DVT which doesn't explain the LUE/RLE edema. Will trial diuresis, obtain echo, and DVT US for new LUE swelling which was negative.     - now on RA  - diurese as needed      Urinary retention  Patient developed new urinary retention.     -Fung's placed by urology - Dc'd on 4/1. Voiding trial  -Flomax  -Renal ultrasound showed no hydronephrosis  - Making adequate urine with fung, c/f hematuria which developed 3/25 and resolved with repositioning of fung. See urology note from same date  - voiding trial prior to DC. If unable to void, urology f/u outpatient       BPH (benign prostatic hyperplasia)  Fung in placed, placed by urology  Flomax daily      Localized swelling of right upper extremity  Patient has swelling and pain in the right arm and some swelling in the left arm as well    -US b/l upper extremity for DVT showed clot  - transitioned from heparin to eliquis 3/30  - Increasing diuresis     History of Bedbug bite with infection  Patient started on Permethrin daily for 5 days starting 3/12      Proteus infection  Abx d/c'ed 3/30    Was on ertapenem to cover proteus/enterobacter growing from leg wound; source control achieved s/p L AKA    Chronic anticoagulation  - See DVT      History of amputation of left high mid foot   See osteo      Stage 3b chronic kidney disease  Creatine stable for now.  "BMP reviewed- noted Estimated Creatinine Clearance: 67.9 mL/min (based on SCr of 1.2 mg/dL). according to latest data. Based on current GFR, CKD stage is stage 3 - GFR 30-59.  Monitor UOP and serial BMP and adjust therapy as needed. Renally dose meds. Avoid nephrotoxic medications and procedures.    Severe sepsis  Source control achieved with proximal amputation. Per ID can discontinue antimicrobials 24-48 hours after surgery as long term treatment is not indicated    This patient does have evidence of infective focus  My overall impression is sepsis.  Source: Skin and Soft Tissue (location left leg)  Antibiotics given-   Antibiotics (72h ago, onward)      None          Latest lactate reviewed-  No results for input(s): "LACTATE", "POCLAC" in the last 72 hours.    Organ dysfunction indicated by Acute kidney injury    Fluid challenge Actual Body weight- Patient will receive 30ml/kg actual body weight to calculate fluid bolus for treatment of septic shock.     Post- resuscitation assessment No - Post resuscitation assessment not needed       Will Not start Pressors-     - See osteo      Other hyperlipidemia  Continue home atorvastatin      Diabetic ketoacidosis without coma associated with type 2 diabetes mellitus  Resolved    Patient's FSGs are uncontrolled due to hyperglycemia on current medication regimen.  Last A1c reviewed-   Lab Results   Component Value Date    HGBA1C >14.0 (H) 03/05/2024     Most recent fingerstick glucose reviewed-   Recent Labs   Lab 03/30/24  2154 03/31/24  0159 03/31/24  0837 03/31/24  1231   POCTGLUCOSE 311* 279* 196* 145*       Current correctional scale   LD SSI  Maintain anti-hyperglycemic dose as follows-   Antihyperglycemics (From admission, onward)      Start     Stop Route Frequency Ordered    03/31/24 1130  insulin aspart U-100 pen 6-8 Units         -- SubQ 3 times daily with meals 03/31/24 0845    03/31/24 0900  insulin detemir U-100 (Levemir) pen 17 Units         -- SubQ 2 times " daily 03/31/24 0845    03/29/24 2346  insulin aspart U-100 pen 0-10 Units         -- SubQ Before meals, nightly and at 0200 PRN 03/29/24 2247          Hold Oral hypoglycemics while patient is in the hospital.  Will keep patient on DKA pathway with insulin drip and fluid with protocol in place for switching to subcutaneous insulin as anion gap closes.      DKA    - Resolved  - Continue insulin regimen and BG checks TIDWM and QHS and 2am  - patient tends to become hypoglycemic around dinnertime  - could be insulin stacking from breakfast and lunch insulin aspart given poor PO intake despite assistance with feeding   - detemir 14 unit BID  - aspart scheduled w/ only breakfast and lunch and dinner with different units  -Inpatient consult to Endocrine    Paroxysmal atrial fibrillation  Patient with Persistent (7 days or more) atrial fibrillation which is uncontrolled currently with    . Patient is currently in sinus rhythm.[id number] Score: 4. . Anticoagulation indicated. Anticoagulation done with heparin/eliquis .    - ac with eliquis 5 BID resumed 3/30    Chronic deep vein thrombosis (DVT) of right upper extremity  - eliquis transitioned to Heparin which was paused temporarily d/t concern of new hematuria which developed 3/25. Discussed with urology, the hematuria resolved with repositioning of the fung. Heparin resumed, but will temporarily be paused as patient needs preoperative blood transfusion and has minimal IV access d/t placement difficulties & limb alert on R-arm d/t DVT    - resumed eliquis 3/30     - 3/31 new LUE swelling c/f DVT, US bilateral negative for new DVT - continue eliquis 5 BID      Iron deficiency anemia  Patient's anemia is currently uncontrolled. Has received 2 units of PRBCs on 3/23 and 3/16 . Etiology likely d/t chronic blood loss and Iron deficiency. Receiving 1u on 3/26 preoperatively    Current CBC reviewed-   Lab Results   Component Value Date    HGB 6.8 (L) 03/31/2024    HCT 22.7 (L)  03/31/2024     Monitor serial CBC and transfuse if patient becomes hemodynamically unstable, symptomatic or H/H drops below 7/21.    Cellulitis of left lower leg  Resolved w/ source control, see osteo      Peripheral arterial disease  Resumed plavix 3/29      YNES (acute kidney injury)  Resolving    Patient with acute kidney injury/acute renal failure likely due to pre-renal azotemia due to dehydration and post-obstructive d/t urinary retention  YNES is currently stable. Baseline creatinine  1.1  - Labs reviewed- Renal function/electrolytes with Estimated Creatinine Clearance: 81.5 mL/min (based on SCr of 1 mg/dL). according to latest data. Monitor urine output and serial BMP and adjust therapy as needed. Avoid nephrotoxins and renally dose meds for GFR listed above.    Creatinine 2.1 on admit, baseline around 1.1  - prerenal vs obstructive    Lab Results   Component Value Date    CREATININE 1.0 04/01/2024       Plan:   - fung out on 4/1, voiding trial  - Strict I&Os and daily weights   - Daily BMP  - Trend sCr  - Avoid nephrotoxic agents such as NSAIDs, ACEi, ARBs and IV radiocontrast.  - Renally dose meds to current GFR.  - Maintain MAP > 65.  - No indications for HD at this time.   - Maintain electrolytes at goal: Mg > 2, Phos > 3, K > 4.           Insulin dependent type 2 diabetes mellitus  Insulin mgmt per endocrine    Patient's FSGs are uncontrolled due to hyperglycemia on current medication regimen.  Last A1c reviewed-   Lab Results   Component Value Date    HGBA1C >14.0 (H) 03/05/2024     Most recent fingerstick glucose reviewed-   Recent Labs   Lab 03/31/24  2051 04/01/24  0222 04/01/24  0744 04/01/24  1157   POCTGLUCOSE 203* 126* 142* 179*       Current correctional scale  Low  Increase anti-hyperglycemic dose as follows-   Antihyperglycemics (From admission, onward)      Start     Stop Route Frequency Ordered    03/31/24 1130  insulin aspart U-100 pen 6-8 Units         -- SubQ 3 times daily with meals  03/31/24 0845    03/31/24 0900  insulin detemir U-100 (Levemir) pen 17 Units         -- SubQ 2 times daily 03/31/24 0845    03/29/24 2346  insulin aspart U-100 pen 0-10 Units         -- SubQ Before meals, nightly and at 0200 PRN 03/29/24 2247          Hold Oral hypoglycemics while patient is in the hospital.    - Endocrine's following an making insulin adjustments as needed.    Essential hypertension  Chronic, uncontrolled. Latest blood pressure and vitals reviewed-     Temp:  [98.7 °F (37.1 °C)-100.6 °F (38.1 °C)]   Pulse:  [59-72]   Resp:  [16-22]   BP: (126-162)/(54-84)   SpO2:  [94 %-100 %] .   Home meds for hypertension were reviewed and noted below.   Hypertension Medications               furosemide (LASIX) 40 MG tablet Take 20 mg by mouth.    hydrALAZINE (APRESOLINE) 100 MG tablet Take 1 tablet (100 mg total) by mouth every 8 (eight) hours.    isosorbide dinitrate (ISORDIL) 10 MG tablet Take 1 tablet (10 mg total) by mouth 3 (three) times daily.    NIFEdipine (PROCARDIA-XL) 60 MG (OSM) 24 hr tablet Take 1 tablet (60 mg total) by mouth once daily.            While in the hospital, will manage blood pressure as follows; Adjust home antihypertensive regimen as follows- will keep nifedipine - increased to 90     Will utilize p.r.n. blood pressure medication only if patient's blood pressure greater than 180/110 and he develops symptoms such as worsening chest pain or shortness of breath.      VTE Risk Mitigation (From admission, onward)           Ordered     apixaban tablet 5 mg  2 times daily         03/30/24 1346     heparin 25,000 units in dextrose 5% 250 mL (100 units/mL) infusion HIGH INTENSITY nomogram - [facility]  Continuous        Question:  Begin at (units/kg/hr)  Answer:  18 03/20/24 0764                    Discharge Planning   OXANA: 4/1/2024     Code Status: Full Code   Is the patient medically ready for discharge?: No    Reason for patient still in hospital (select all that apply): Patient new problem,  Patient trending condition, Laboratory test, Treatment, and Pending disposition  Discharge Plan A: Skilled Nursing Facility   Discharge Delays: None known at this time              Milagros Nicolas MD  Department of Hospital Medicine   [facility] - Telemetry Stepdown

## 2024-04-01 NOTE — PLAN OF CARE
Pt AAOx3, no c/o of pain reported. Left stump CDI elevated on pillow. Pt has been weaned down to RA from 2L, maintaining O2 sat>92%. VSS, no acute distress noted. POC on going.    Problem: Adult Inpatient Plan of Care  Goal: Plan of Care Review  Outcome: Ongoing, Progressing  Goal: Absence of Hospital-Acquired Illness or Injury  Outcome: Ongoing, Progressing  Goal: Optimal Comfort and Wellbeing  Outcome: Ongoing, Progressing  Goal: Readiness for Transition of Care  Outcome: Ongoing, Progressing     Problem: Diabetes Comorbidity  Goal: Blood Glucose Level Within Targeted Range  Outcome: Ongoing, Progressing     Problem: Infection  Goal: Absence of Infection Signs and Symptoms  Outcome: Ongoing, Progressing     Problem: Impaired Wound Healing  Goal: Optimal Wound Healing  Outcome: Ongoing, Progressing     Problem: Skin Injury Risk Increased  Goal: Skin Health and Integrity  Outcome: Ongoing, Progressing     Problem: Fall Injury Risk  Goal: Absence of Fall and Fall-Related Injury  Outcome: Ongoing, Progressing

## 2024-04-01 NOTE — ASSESSMENT & PLAN NOTE
Patient with Persistent (7 days or more) atrial fibrillation which is uncontrolled currently with    . Patient is currently in sinus rhythm.[id number] Score: 4. . Anticoagulation indicated. Anticoagulation done with heparin/eliquis .    - ac with eliquis 5 BID resumed 3/30

## 2024-04-02 ENCOUNTER — DOCUMENTATION ONLY (OUTPATIENT)
Dept: ELECTROPHYSIOLOGY | Facility: CLINIC | Age: 74
End: 2024-04-02
Payer: MEDICARE

## 2024-04-02 PROBLEM — Z95.0 PACEMAKER: Chronic | Status: ACTIVE | Noted: 2024-04-02

## 2024-04-02 PROBLEM — I44.30 AVB (ATRIOVENTRICULAR BLOCK): Status: ACTIVE | Noted: 2023-10-11

## 2024-04-02 LAB
ADENOVIRUS: NOT DETECTED
ALBUMIN SERPL BCP-MCNC: 1.4 G/DL (ref 3.5–5.2)
ALP SERPL-CCNC: 128 U/L (ref 55–135)
ALT SERPL W/O P-5'-P-CCNC: 25 U/L (ref 10–44)
ANION GAP SERPL CALC-SCNC: 6 MMOL/L (ref 8–16)
AST SERPL-CCNC: 54 U/L (ref 10–40)
BACTERIA #/AREA URNS AUTO: NORMAL /HPF
BASOPHILS # BLD AUTO: 0.04 K/UL (ref 0–0.2)
BASOPHILS NFR BLD: 0.5 % (ref 0–1.9)
BILIRUB SERPL-MCNC: 0.2 MG/DL (ref 0.1–1)
BILIRUB UR QL STRIP: NEGATIVE
BORDETELLA PARAPERTUSSIS (IS1001): NOT DETECTED
BORDETELLA PERTUSSIS (PTXP): NOT DETECTED
BUN SERPL-MCNC: 17 MG/DL (ref 8–23)
CALCIUM SERPL-MCNC: 8.3 MG/DL (ref 8.7–10.5)
CHLAMYDIA PNEUMONIAE: NOT DETECTED
CHLORIDE SERPL-SCNC: 110 MMOL/L (ref 95–110)
CLARITY UR REFRACT.AUTO: CLEAR
CO2 SERPL-SCNC: 26 MMOL/L (ref 23–29)
COLOR UR AUTO: YELLOW
CORONAVIRUS 229E, COMMON COLD VIRUS: NOT DETECTED
CORONAVIRUS HKU1, COMMON COLD VIRUS: NOT DETECTED
CORONAVIRUS NL63, COMMON COLD VIRUS: NOT DETECTED
CORONAVIRUS OC43, COMMON COLD VIRUS: NOT DETECTED
CREAT SERPL-MCNC: 1 MG/DL (ref 0.5–1.4)
DIFFERENTIAL METHOD BLD: ABNORMAL
EOSINOPHIL # BLD AUTO: 0.4 K/UL (ref 0–0.5)
EOSINOPHIL NFR BLD: 4.6 % (ref 0–8)
ERYTHROCYTE [DISTWIDTH] IN BLOOD BY AUTOMATED COUNT: 15.9 % (ref 11.5–14.5)
EST. GFR  (NO RACE VARIABLE): >60 ML/MIN/1.73 M^2
FINAL PATHOLOGIC DIAGNOSIS: NORMAL
FLUBV RNA NPH QL NAA+NON-PROBE: NOT DETECTED
GLUCOSE SERPL-MCNC: 159 MG/DL (ref 70–110)
GLUCOSE UR QL STRIP: NEGATIVE
GROSS: NORMAL
HCT VFR BLD AUTO: 29.4 % (ref 40–54)
HGB BLD-MCNC: 8.9 G/DL (ref 14–18)
HGB UR QL STRIP: ABNORMAL
HPIV1 RNA NPH QL NAA+NON-PROBE: NOT DETECTED
HPIV2 RNA NPH QL NAA+NON-PROBE: NOT DETECTED
HPIV3 RNA NPH QL NAA+NON-PROBE: NOT DETECTED
HPIV4 RNA NPH QL NAA+NON-PROBE: NOT DETECTED
HUMAN METAPNEUMOVIRUS: NOT DETECTED
HYALINE CASTS UR QL AUTO: 0 /LPF
IMM GRANULOCYTES # BLD AUTO: 0.04 K/UL (ref 0–0.04)
IMM GRANULOCYTES NFR BLD AUTO: 0.5 % (ref 0–0.5)
INFLUENZA A (SUBTYPES H1,H1-2009,H3): NOT DETECTED
KETONES UR QL STRIP: NEGATIVE
LEUKOCYTE ESTERASE UR QL STRIP: NEGATIVE
LYMPHOCYTES # BLD AUTO: 0.5 K/UL (ref 1–4.8)
LYMPHOCYTES NFR BLD: 5.7 % (ref 18–48)
Lab: NORMAL
MAGNESIUM SERPL-MCNC: 1.9 MG/DL (ref 1.6–2.6)
MCH RBC QN AUTO: 24.9 PG (ref 27–31)
MCHC RBC AUTO-ENTMCNC: 30.3 G/DL (ref 32–36)
MCV RBC AUTO: 82 FL (ref 82–98)
MICROSCOPIC COMMENT: NORMAL
MONOCYTES # BLD AUTO: 0.5 K/UL (ref 0.3–1)
MONOCYTES NFR BLD: 5.3 % (ref 4–15)
MRSA SCREEN BY PCR: NOT DETECTED
MYCOPLASMA PNEUMONIAE: NOT DETECTED
NEUTROPHILS # BLD AUTO: 7.4 K/UL (ref 1.8–7.7)
NEUTROPHILS NFR BLD: 83.4 % (ref 38–73)
NITRITE UR QL STRIP: NEGATIVE
NRBC BLD-RTO: 0 /100 WBC
OHS QRS DURATION: 94 MS
OHS QTC CALCULATION: 417 MS
PH UR STRIP: 5 [PH] (ref 5–8)
PLATELET # BLD AUTO: 552 K/UL (ref 150–450)
PMV BLD AUTO: 8.9 FL (ref 9.2–12.9)
POCT GLUCOSE: 109 MG/DL (ref 70–110)
POCT GLUCOSE: 143 MG/DL (ref 70–110)
POCT GLUCOSE: 170 MG/DL (ref 70–110)
POCT GLUCOSE: 181 MG/DL (ref 70–110)
POCT GLUCOSE: 83 MG/DL (ref 70–110)
POTASSIUM SERPL-SCNC: 4.3 MMOL/L (ref 3.5–5.1)
PROT SERPL-MCNC: 6.2 G/DL (ref 6–8.4)
PROT UR QL STRIP: ABNORMAL
RBC # BLD AUTO: 3.57 M/UL (ref 4.6–6.2)
RBC #/AREA URNS AUTO: 3 /HPF (ref 0–4)
RESPIRATORY INFECTION PANEL SOURCE: NORMAL
RSV RNA NPH QL NAA+NON-PROBE: NOT DETECTED
RV+EV RNA NPH QL NAA+NON-PROBE: NOT DETECTED
SARS-COV-2 RNA RESP QL NAA+PROBE: NOT DETECTED
SODIUM SERPL-SCNC: 142 MMOL/L (ref 136–145)
SP GR UR STRIP: 1.01 (ref 1–1.03)
SQUAMOUS #/AREA URNS AUTO: 0 /HPF
URN SPEC COLLECT METH UR: ABNORMAL
WBC # BLD AUTO: 8.82 K/UL (ref 3.9–12.7)
WBC #/AREA URNS AUTO: 3 /HPF (ref 0–5)

## 2024-04-02 PROCEDURE — 25000003 PHARM REV CODE 250

## 2024-04-02 PROCEDURE — 87798 DETECT AGENT NOS DNA AMP: CPT | Mod: 59

## 2024-04-02 PROCEDURE — 87486 CHLMYD PNEUM DNA AMP PROBE: CPT

## 2024-04-02 PROCEDURE — 97530 THERAPEUTIC ACTIVITIES: CPT | Mod: CO

## 2024-04-02 PROCEDURE — 36415 COLL VENOUS BLD VENIPUNCTURE: CPT

## 2024-04-02 PROCEDURE — 25000003 PHARM REV CODE 250: Performed by: STUDENT IN AN ORGANIZED HEALTH CARE EDUCATION/TRAINING PROGRAM

## 2024-04-02 PROCEDURE — 97110 THERAPEUTIC EXERCISES: CPT | Mod: CO

## 2024-04-02 PROCEDURE — 97530 THERAPEUTIC ACTIVITIES: CPT

## 2024-04-02 PROCEDURE — 85025 COMPLETE CBC W/AUTO DIFF WBC: CPT

## 2024-04-02 PROCEDURE — 97112 NEUROMUSCULAR REEDUCATION: CPT

## 2024-04-02 PROCEDURE — 93005 ELECTROCARDIOGRAM TRACING: CPT

## 2024-04-02 PROCEDURE — A4216 STERILE WATER/SALINE, 10 ML: HCPCS

## 2024-04-02 PROCEDURE — 27000207 HC ISOLATION

## 2024-04-02 PROCEDURE — 20600001 HC STEP DOWN PRIVATE ROOM

## 2024-04-02 PROCEDURE — 63600175 PHARM REV CODE 636 W HCPCS

## 2024-04-02 PROCEDURE — 80053 COMPREHEN METABOLIC PANEL: CPT

## 2024-04-02 PROCEDURE — 99232 SBSQ HOSP IP/OBS MODERATE 35: CPT | Mod: ,,, | Performed by: NURSE PRACTITIONER

## 2024-04-02 PROCEDURE — 93010 ELECTROCARDIOGRAM REPORT: CPT | Mod: ,,, | Performed by: INTERNAL MEDICINE

## 2024-04-02 PROCEDURE — 83735 ASSAY OF MAGNESIUM: CPT

## 2024-04-02 PROCEDURE — 97535 SELF CARE MNGMENT TRAINING: CPT | Mod: CO

## 2024-04-02 PROCEDURE — 87641 MR-STAPH DNA AMP PROBE: CPT

## 2024-04-02 PROCEDURE — 81001 URINALYSIS AUTO W/SCOPE: CPT

## 2024-04-02 RX ORDER — FUROSEMIDE 10 MG/ML
40 INJECTION INTRAMUSCULAR; INTRAVENOUS DAILY
Status: DISCONTINUED | OUTPATIENT
Start: 2024-04-02 | End: 2024-04-05

## 2024-04-02 RX ORDER — MAGNESIUM SULFATE HEPTAHYDRATE 40 MG/ML
2 INJECTION, SOLUTION INTRAVENOUS ONCE
Status: COMPLETED | OUTPATIENT
Start: 2024-04-02 | End: 2024-04-02

## 2024-04-02 RX ADMIN — Medication 10 ML: at 05:04

## 2024-04-02 RX ADMIN — NIFEDIPINE 90 MG: 30 TABLET, FILM COATED, EXTENDED RELEASE ORAL at 09:04

## 2024-04-02 RX ADMIN — ATORVASTATIN CALCIUM 40 MG: 40 TABLET, FILM COATED ORAL at 09:04

## 2024-04-02 RX ADMIN — MUPIROCIN: 20 OINTMENT TOPICAL at 09:04

## 2024-04-02 RX ADMIN — TRAZODONE HYDROCHLORIDE 25 MG: 50 TABLET ORAL at 09:04

## 2024-04-02 RX ADMIN — Medication 10 ML: at 12:04

## 2024-04-02 RX ADMIN — CLOPIDOGREL BISULFATE 75 MG: 75 TABLET ORAL at 09:04

## 2024-04-02 RX ADMIN — APIXABAN 5 MG: 5 TABLET, FILM COATED ORAL at 09:04

## 2024-04-02 RX ADMIN — COLLAGENASE SANTYL: 250 OINTMENT TOPICAL at 09:04

## 2024-04-02 RX ADMIN — CEFEPIME 2 G: 2 INJECTION, POWDER, FOR SOLUTION INTRAVENOUS at 05:04

## 2024-04-02 RX ADMIN — HYDROMORPHONE HYDROCHLORIDE 0.5 MG: 1 INJECTION, SOLUTION INTRAMUSCULAR; INTRAVENOUS; SUBCUTANEOUS at 10:04

## 2024-04-02 RX ADMIN — CEFEPIME 2 G: 2 INJECTION, POWDER, FOR SOLUTION INTRAVENOUS at 09:04

## 2024-04-02 RX ADMIN — INSULIN ASPART 6 UNITS: 100 INJECTION, SOLUTION INTRAVENOUS; SUBCUTANEOUS at 09:04

## 2024-04-02 RX ADMIN — GABAPENTIN 300 MG: 300 CAPSULE ORAL at 09:04

## 2024-04-02 RX ADMIN — PANTOPRAZOLE SODIUM 40 MG: 40 TABLET, DELAYED RELEASE ORAL at 09:04

## 2024-04-02 RX ADMIN — FUROSEMIDE 40 MG: 10 INJECTION, SOLUTION INTRAVENOUS at 12:04

## 2024-04-02 RX ADMIN — INSULIN ASPART 6 UNITS: 100 INJECTION, SOLUTION INTRAVENOUS; SUBCUTANEOUS at 02:04

## 2024-04-02 RX ADMIN — TAMSULOSIN HYDROCHLORIDE 0.4 MG: 0.4 CAPSULE ORAL at 09:04

## 2024-04-02 RX ADMIN — Medication 10 ML: at 06:04

## 2024-04-02 RX ADMIN — CEFEPIME 2 G: 2 INJECTION, POWDER, FOR SOLUTION INTRAVENOUS at 12:04

## 2024-04-02 RX ADMIN — MAGNESIUM SULFATE HEPTAHYDRATE 2 G: 40 INJECTION, SOLUTION INTRAVENOUS at 12:04

## 2024-04-02 NOTE — PROGRESS NOTES
[facility] - Telemetry [facility] Medicine  Progress Note    Patient Name: Saji Castañeda  MRN: [id number]  Patient Class: IP- Inpatient   Admission Date: 3/5/2024  Length of Stay: 28 days  Attending Physician: Jm Rosa MD  Primary Care Provider: [facility] -        Subjective:     Principal Problem:Osteomyelitis of left lower extremity        HPI:  Saji Castañeda is a 74 y.o. male with a medical history of DM2, HLD, HTN, chronic osteomyelitis of L heel, L TMA, PAD, hx of ESBL klebsiella, acinetobacter bacteremia, presenting with hyperglycemia. He presented to the ED via EMS and his POCT glucose on arrival was >500. Serum blood glucose 667 with anion gap of 17 and elevated ketones. Serum potassium 3.4, corrected sodium 149. Urinalysis significant for RBCs, glucosuria, and moderate bacteria and yeast. CBC revealed leukocytosis, elevated platelets, and mild anemia. CMP shows Na 135, K 3.4, BUN 29, Cr 2.1, Glucose 677, Lactate 4.61. BNP and troponin elevated. GFR 32.2. Insulin drip, IV fluids, zosyn, vancomycin, and potassium given.      He is unable to provide much history, but states that he has not been taking his home medications for at least a week. He reports shortness of breath, fatigue, and weakness for the last 6-7 days. He has chills and reports episodes of emesis. He denies abdominal pain, chest pain, palpitations, recent illness/infection, fevers, changes to bowel movements and urinary output. Patient lives with his brother and sister at home. He was last seen in the ED on 2/21 after a fall. He was hypoglycemic BGL 60 at that time which returned to normal after eating. He was also scheduled for a wound clinic appointment today 3/5 at [facility] with Dr. Wilson.        Overview/Hospital Course:  Wound care and cultures collected by Podiatry with recs for Vascular consult for amputation. Patient declined amputation. ID recommending IV Ertapenem for chronic osteomyelitis for 6 week duration  (EED: 4/15/24). With high wound care needs and IV antibiotics plan patient needing long-term placement. He is being treated with topical permethrin for bedbugs. Wound care has been dressing the wounds. 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 Still adjusting insulin to control sugars better. Giving fluids for YNES. Poor PO intake even with feeding assistance. LTAC authorization denied by [employer]. Waiting for SNF placement ([facility], awaiting auth and family to complete paperwork). Patient started spiking fever again and infectious work up done. ID re-consulted. Patient has been counseled multiple times that source control cannot be achieved without amputation. Patient agreeable to AKA scheduled for 3/27. Transitioned to heparin from eliquis. Fung placed for urinary rentention. Discussed new hematuria with urology, hematuria resolved with repositioning of fung. Pt to receive 1u PRBC preoperatively per vascular surgery. S/p L AKA on 3/27 with vascular surgery. ID rec 24-48h post-op abx now that source control is achieved, unless further concerned for infection. Delirium precautions + trazodone added for sleep hygiene, rescheduled lab times appropriately. Resuming heparin drip + plavix 3/29 per vascular surg. 3/30 transitioned to eliquis from heparin gtt. LUE swelling noted 3/31, repeat DVT US of upper extremities were negative for DVT's. Hgb downtrending at 6.8, will receive 1u PRBC, no overt signs of bleeding. Febrile on 4/1 overnight to 100.6, possibly atelectasis or HAP for which he received 1 dose ertapenem. Blood cx collected, CXR showing possible opacity in mid right lung. Started on vanc/cefepime for HAP. EKG overnight 4/1-4/2 c/f complete AVB. Discussed with electrophysiologists, pt has a leadless pacemaker implanted, no acute intervention indicated at this time. Cardiac device check pending, pt stable.    Interval History: Overnight bradycardia  noticed on tele, pt asymptomatic, EKG findings c/f complete AVB. Pts HR higher in AM, discussed with EP, no acute intervention indicated as he already has a leadless pacemaker in place.      Objective:     Vital Signs (Most Recent):  Temp: 99.4 °F (37.4 °C) (04/02/24 1220)  Pulse: 62 (04/02/24 1220)  Resp: 18 (04/02/24 1220)  BP: 139/64 (04/02/24 1220)  SpO2: 96 % (04/02/24 1220) Vital Signs (24h Range):  Temp:  [97.8 °F (36.6 °C)-99.4 °F (37.4 °C)] 99.4 °F (37.4 °C)  Pulse:  [47-62] 62  Resp:  [16-20] 18  SpO2:  [95 %-100 %] 96 %  BP: (139-172)/(64-76) 139/64     Weight: 119.7 kg (264 lb)  Body mass index is 38.99 kg/m².    Intake/Output Summary (Last 24 hours) at 4/2/2024 1359  Last data filed at 4/2/2024 1347  Gross per 24 hour   Intake 200 ml   Output 2000 ml   Net -1800 ml         Physical Exam  Constitutional:       General: He is not in acute distress.     Appearance: Normal appearance. He is well-developed. Ill appearance: chronically ill appearing.   HENT:      Head: Normocephalic and atraumatic.      Right Ear: External ear normal.      Left Ear: External ear normal.      Nose: Nose normal.   Eyes:      General:         Right eye: No discharge.         Left eye: No discharge.      Conjunctiva/sclera: Conjunctivae normal.   Cardiovascular:      Rate and Rhythm: Normal rate and regular rhythm.      Pulses: Normal pulses.   Pulmonary:      Effort: Pulmonary effort is normal. No respiratory distress.      Breath sounds: No stridor.   Abdominal:      General: Abdomen is flat.      Palpations: Abdomen is soft.      Tenderness: There is no abdominal tenderness.   Musculoskeletal:         General: Swelling and deformity present.      Cervical back: Normal range of motion.      Comments: S/p L-AKA, bandage CDI  Interval decrease in UE swelling   Skin:     General: Skin is warm and dry.   Neurological:      General: No focal deficit present.      Mental Status: He is alert.             Significant Labs: All  pertinent labs within the past 24 hours have been reviewed.    Significant Imaging: I have reviewed all pertinent imaging results/findings within the past 24 hours.    Assessment/Plan:      * Osteomyelitis of left lower extremity  Resolved w/ source control    Patient with Proteus and enterobacter on leg cultures collected by Podiatry with concern for acute on chronic osteo. Fungal culture grew fusarium. He has started spiking fever and still is not agreeable to amputation, will do infectious work up to look out for source.    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 Can discontinue antimicrobials 24-48 hours after surgery as long term treatment is not indicated.       Pacemaker  Pt with leadless [employer] pacemaker in place, to be interrogated while inpatient      Fever  Febrile on 4/1 to 100.6. Small right mid lung opacity seen on CXR. Could be HAP vs UTI from Fung vs atelectasis in setting of post of fever after AKA on 3/27. Will trend fever. Not hypotensive. No leukocytosis. New upper extremity DVTs ruled out.     - Blood cx repeated  - Procal pending  - f/u RIP, respiratory culture, UA, MRSA PCR -> all negative to date 4/2  - Fung removed -> replaced d/t retention  - will cover empirically with cefepime, vanc d/c'ed s/p negative MRSA PCR      Edema due to hypervolemia  Pt with developing extremity edema, partially d/t known RUE DVT which doesn't explain the LUE/RLE edema. Will trial diuresis, obtain echo, and DVT US for new LUE swelling which was negative.     - now on RA  - diurese as needed      Urinary retention  Patient developed new urinary retention.     -Fung's placed by urology - Dc'd on 4/1. Voiding trial failed 4/1, fung replaced  -Flomax  -Renal ultrasound showed no  hydronephrosis  - Making adequate urine with fung, c/f hematuria which developed 3/25 and resolved with repositioning of fung. See urology note from same date  - voiding trial prior to DC. If unable to void, urology f/u outpatient       BPH (benign prostatic hyperplasia)  Failed voiding trial 4/1, fung replaced    Fung in placed, placed by urology  Flomax daily      Localized swelling of right upper extremity  Resolved, negative DVT US 3/31    Patient has swelling and pain in the right arm and some swelling in the left arm as well    -US b/l upper extremity for DVT showed clot  - transitioned from heparin to eliquis 3/30  - Increasing diuresis     History of Bedbug bite with infection  Patient started on Permethrin daily for 5 days starting 3/12      Proteus infection  Abx d/c'ed 3/30    Was on ertapenem to cover proteus/enterobacter growing from leg wound; source control achieved s/p L AKA    Chronic anticoagulation  - See DVT      History of amputation of left high mid foot   See osteo      Stage 3b chronic kidney disease  Creatine stable for now. BMP reviewed- noted Estimated Creatinine Clearance: 81.5 mL/min (based on SCr of 1 mg/dL). according to latest data. Based on current GFR, CKD stage is stage 3 - GFR 30-59.  Monitor UOP and serial BMP and adjust therapy as needed. Renally dose meds. Avoid nephrotoxic medications and procedures.    Severe sepsis  Source control achieved with proximal amputation. Per ID can discontinue antimicrobials 24-48 hours after surgery as long term treatment is not indicated    This patient does have evidence of infective focus  My overall impression is sepsis.  Source: Skin and Soft Tissue (location left leg)  Antibiotics given-   Antibiotics (72h ago, onward)      Start     Stop Route Frequency Ordered    04/01/24 1315  ceFEPIme (MAXIPIME) 2 g in dextrose 5 % in water (D5W) 100 mL IVPB (MB+)         -- IV Every 8 hours (non-standard times) 04/01/24 1201    03/31/24 2100   "mupirocin 2 % ointment         04/05/24 2059 Nasl 2 times daily 03/31/24 1326          Latest lactate reviewed-  No results for input(s): "LACTATE", "POCLAC" in the last 72 hours.    Organ dysfunction indicated by Acute kidney injury    Fluid challenge Actual Body weight- Patient will receive 30ml/kg actual body weight to calculate fluid bolus for treatment of septic shock.     Post- resuscitation assessment No - Post resuscitation assessment not needed       Will Not start Pressors-     - See osteo      Other hyperlipidemia  Continue home atorvastatin      Diabetic ketoacidosis without coma associated with type 2 diabetes mellitus  Resolved    Patient's FSGs are uncontrolled due to hyperglycemia on current medication regimen.  Last A1c reviewed-   Lab Results   Component Value Date    HGBA1C >14.0 (H) 03/05/2024     Most recent fingerstick glucose reviewed-   Recent Labs   Lab 04/01/24  2100 04/02/24  0302 04/02/24  0825 04/02/24  1151   POCTGLUCOSE 172* 181* 170* 143*       Current correctional scale   LD SSI  Maintain anti-hyperglycemic dose as follows-   Antihyperglycemics (From admission, onward)      Start     Stop Route Frequency Ordered    03/31/24 1130  insulin aspart U-100 pen 6-8 Units         -- SubQ 3 times daily with meals 03/31/24 0845    03/31/24 0900  insulin detemir U-100 (Levemir) pen 17 Units         -- SubQ 2 times daily 03/31/24 0845    03/29/24 2346  insulin aspart U-100 pen 0-10 Units         -- SubQ Before meals, nightly and at 0200 PRN 03/29/24 2247          Hold Oral hypoglycemics while patient is in the hospital.  Will keep patient on DKA pathway with insulin drip and fluid with protocol in place for switching to subcutaneous insulin as anion gap closes.      DKA    - Resolved  - Continue insulin regimen and BG checks TIDWM and QHS and 2am  - patient tends to become hypoglycemic around dinnertime  - could be insulin stacking from breakfast and lunch insulin aspart given poor PO intake " despite assistance with feeding   - detemir 14 unit BID  - aspart scheduled w/ only breakfast and lunch and dinner with different units  -Inpatient consult to Endocrine    AVB (atrioventricular block)  Bradycardia on tele noted night of 4/1 with HR of 48, pt asymptomatic. EKG c/f complete AVB. Tele morning of 4/2 showing junctional escape rhythm with HR 60s-70s. Electrophysiology consulted, pt has leadless pacemaker in place already set at 50bpm. Will interrogate device, no acute cardiology intervention indicated at this time.       Paroxysmal atrial fibrillation  Patient with Persistent (7 days or more) atrial fibrillation which is uncontrolled currently with    . Patient is currently in sinus rhythm.[id number] Score: 4. . Anticoagulation indicated. Anticoagulation done with heparin/eliquis .    - ac with eliquis 5 BID resumed 3/30    Chronic deep vein thrombosis (DVT) of right upper extremity  - eliquis transitioned to Heparin which was paused temporarily d/t concern of new hematuria which developed 3/25. Discussed with urology, the hematuria resolved with repositioning of the fung. Heparin resumed, but will temporarily be paused as patient needs preoperative blood transfusion and has minimal IV access d/t placement difficulties & limb alert on R-arm d/t DVT    - resumed eliquis 3/30     - 3/31 new LUE swelling c/f DVT, US bilateral negative for new DVT - continue eliquis 5 BID      Iron deficiency anemia  Patient's anemia is currently uncontrolled. Has received 2 units of PRBCs on 3/23 and 3/16 . Etiology likely d/t chronic blood loss and Iron deficiency. Receiving 1u on 3/26 preoperatively    Current CBC reviewed-   Lab Results   Component Value Date    HGB 8.9 (L) 04/02/2024    HCT 29.4 (L) 04/02/2024     Monitor serial CBC and transfuse if patient becomes hemodynamically unstable, symptomatic or H/H drops below 7/21.    Cellulitis of left lower leg  Resolved w/ source control, see osteo      Peripheral  arterial disease  Resumed plavix 3/29      YNES (acute kidney injury)  Resolving    Patient with acute kidney injury/acute renal failure likely due to pre-renal azotemia due to dehydration and post-obstructive d/t urinary retention  YNES is currently stable. Baseline creatinine  1.1  - Labs reviewed- Renal function/electrolytes with Estimated Creatinine Clearance: 81.5 mL/min (based on SCr of 1 mg/dL). according to latest data. Monitor urine output and serial BMP and adjust therapy as needed. Avoid nephrotoxins and renally dose meds for GFR listed above.    Creatinine 2.1 on admit, baseline around 1.1  - prerenal vs obstructive    Lab Results   Component Value Date    CREATININE 1.0 04/02/2024       Plan:   4/2 failed voiding trial w/ pt retaining >1.5L urine on scan, fung replaced  - Strict I&Os and daily weights   - Daily BMP  - Trend sCr  - Avoid nephrotoxic agents such as NSAIDs, ACEi, ARBs and IV radiocontrast.  - Renally dose meds to current GFR.  - Maintain MAP > 65.  - No indications for HD at this time.   - Maintain electrolytes at goal: Mg > 2, Phos > 3, K > 4.           Insulin dependent type 2 diabetes mellitus  Insulin mgmt per endocrine    Patient's FSGs are uncontrolled due to hyperglycemia on current medication regimen.  Last A1c reviewed-   Lab Results   Component Value Date    HGBA1C >14.0 (H) 03/05/2024     Most recent fingerstick glucose reviewed-   Recent Labs   Lab 04/01/24  2100 04/02/24  0302 04/02/24  0825 04/02/24  1151   POCTGLUCOSE 172* 181* 170* 143*       Current correctional scale  Low  Increase anti-hyperglycemic dose as follows-   Antihyperglycemics (From admission, onward)      Start     Stop Route Frequency Ordered    03/31/24 1130  insulin aspart U-100 pen 6-8 Units         -- SubQ 3 times daily with meals 03/31/24 0845    03/31/24 0900  insulin detemir U-100 (Levemir) pen 17 Units         -- SubQ 2 times daily 03/31/24 0845    03/29/24 2346  insulin aspart U-100 pen 0-10 Units          -- SubQ Before meals, nightly and at 0200 PRN 03/29/24 2247          Hold Oral hypoglycemics while patient is in the hospital.    - Endocrine's following an making insulin adjustments as needed.    Essential hypertension  Chronic, uncontrolled. Latest blood pressure and vitals reviewed-     Temp:  [97.8 °F (36.6 °C)-99.4 °F (37.4 °C)]   Pulse:  [47-62]   Resp:  [16-20]   BP: (139-172)/(64-76)   SpO2:  [95 %-100 %] .   Home meds for hypertension were reviewed and noted below.   Hypertension Medications               furosemide (LASIX) 40 MG tablet Take 20 mg by mouth.    hydrALAZINE (APRESOLINE) 100 MG tablet Take 1 tablet (100 mg total) by mouth every 8 (eight) hours.    isosorbide dinitrate (ISORDIL) 10 MG tablet Take 1 tablet (10 mg total) by mouth 3 (three) times daily.    NIFEdipine (PROCARDIA-XL) 60 MG (OSM) 24 hr tablet Take 1 tablet (60 mg total) by mouth once daily.            While in the hospital, will manage blood pressure as follows; Adjust home antihypertensive regimen as follows- will keep nifedipine - increased to 90     Will utilize p.r.n. blood pressure medication only if patient's blood pressure greater than 180/110 and he develops symptoms such as worsening chest pain or shortness of breath.      VTE Risk Mitigation (From admission, onward)           Ordered     apixaban tablet 5 mg  2 times daily         03/30/24 1346     heparin 25,000 units in dextrose 5% 250 mL (100 units/mL) infusion HIGH INTENSITY nomogram - [facility]  Continuous        Question:  Begin at (units/kg/hr)  Answer:  18 03/20/24 0747                    Discharge Planning   OXANA: 4/3/2024     Code Status: Full Code   Is the patient medically ready for discharge?: No    Reason for patient still in hospital (select all that apply): Patient trending condition  Discharge Plan A: Skilled Nursing Facility   Discharge Delays: None known at this time              Jori Byrd MD  Department of Hospital Medicine   [facility] - Telemetry  Stepdown

## 2024-04-02 NOTE — PROGRESS NOTES
Vancomycin discontinued, pharmacy to sign off at this time.    Please call or re-consult with any questions.    Patel Garza, PharmD  Spectra [phone]

## 2024-04-02 NOTE — ASSESSMENT & PLAN NOTE
Resolved    Patient's FSGs are uncontrolled due to hyperglycemia on current medication regimen.  Last A1c reviewed-   Lab Results   Component Value Date    HGBA1C >14.0 (H) 03/05/2024     Most recent fingerstick glucose reviewed-   Recent Labs   Lab 04/01/24  2100 04/02/24  0302 04/02/24  0825 04/02/24  1151   POCTGLUCOSE 172* 181* 170* 143*       Current correctional scale   LD SSI  Maintain anti-hyperglycemic dose as follows-   Antihyperglycemics (From admission, onward)    Start     Stop Route Frequency Ordered    03/31/24 1130  insulin aspart U-100 pen 6-8 Units         -- SubQ 3 times daily with meals 03/31/24 0845    03/31/24 0900  insulin detemir U-100 (Levemir) pen 17 Units         -- SubQ 2 times daily 03/31/24 0845    03/29/24 2346  insulin aspart U-100 pen 0-10 Units         -- SubQ Before meals, nightly and at 0200 PRN 03/29/24 2247        Hold Oral hypoglycemics while patient is in the hospital.  Will keep patient on DKA pathway with insulin drip and fluid with protocol in place for switching to subcutaneous insulin as anion gap closes.      DKA    - Resolved  - Continue insulin regimen and BG checks TIDWM and QHS and 2am  - patient tends to become hypoglycemic around dinnertime  - could be insulin stacking from breakfast and lunch insulin aspart given poor PO intake despite assistance with feeding   - detemir 14 unit BID  - aspart scheduled w/ only breakfast and lunch and dinner with different units  -Inpatient consult to Endocrine

## 2024-04-02 NOTE — PROGRESS NOTES
[facility] - Telemetry Stepdown  Endocrinology  Progress Note    Admit Date: 3/5/2024     Reason for Consult: Management of T2DM, Hyperglycemia     Diabetes diagnosis year: > 25 years    Home Diabetes Medications:  Novolog 6 units w/ meals and Lantus 45 units nightly (states he is not taking).     How often checking glucose at home?  Not really checking    BG readings on regimen: 200's  Hypoglycemia on the regimen?  Yes (When he was taking the Lantus)  Missed doses on regimen?  Yes    Diabetes Complications include:     Hyperglycemia, Hypoglycemia , Diabetic chronic kidney disease     , Diabetic dermatitis, Foot ulcer  , and Periodontal disease    Complicating diabetes co morbidities:   HTN; HLD      HPI: Saji Castañeda is a 75 yo M with a history of Afib, T2D, HLD, HTN, chronic osteomyelitis of L heel, s/p L TMA, PID, ESBL Klebsiella and Acinetobacter bacteremia who was admitted after being found down at home and DKA. Endocrine consulted to manage hyperglycemia and type 2 diabetes.   Hemoglobin A1C   Date Value Ref Range Status   03/05/2024 >14.0 (H) 4.0 - 5.6 % Final     Comment:     ADA Screening Guidelines:  5.7-6.4%  Consistent with prediabetes  >or=6.5%  Consistent with diabetes    High levels of fetal hemoglobin interfere with the HbA1C  assay. Heterozygous hemoglobin variants (HbS, HgC, etc)do  not significantly interfere with this assay.   However, presence of multiple variants may affect accuracy.     09/13/2023 11.0 (H) 4.5 - 5.7 % Final   03/10/2023 8.4 (H) 4.0 - 5.6 % Final     Comment:     ADA Screening Guidelines:  5.7-6.4%  Consistent with prediabetes  >or=6.5%  Consistent with diabetes    High levels of fetal hemoglobin interfere with the HbA1C  assay. Heterozygous hemoglobin variants (HbS, HgC, etc)do  not significantly interfere with this assay.   However, presence of multiple variants may affect accuracy.     09/17/2022 9.9 (H) 4.0 - 5.6 % Final     Comment:     ADA Screening Guidelines:  5.7-6.4%   "Consistent with prediabetes  >or=6.5%  Consistent with diabetes    High levels of fetal hemoglobin interfere with the HbA1C  assay. Heterozygous hemoglobin variants (HbS, HgC, etc)do  not significantly interfere with this assay.   However, presence of multiple variants may affect accuracy.              Interval HPI:   Overnight events: No acute events overnight. Patient in room 8078/8078 A. Blood glucose stable. BG at goal on current insulin regimen (SSI, prandial, and basal insulin ). Steroid use- None. 6 Days Post-Op  Renal function- Normal   Vasopressors-  None       Endocrine will continue to follow and manage insulin orders inpatient.         Diet diabetic  Calorie     Eatin%  Nausea: No  Hypoglycemia and intervention: No  Fever: No  TPN and/or TF: No    BP (!) 153/67 (BP Location: Left arm, Patient Position: Lying)   Pulse (!) 52   Temp 98.5 °F (36.9 °C) (Oral)   Resp 18   Ht 5' 9" (1.753 m)   Wt 119.7 kg (264 lb)   SpO2 100%   BMI 38.99 kg/m²     Labs Reviewed and Include    Recent Labs   Lab 24  1254   *   CALCIUM 8.6*   ALBUMIN 1.4*   PROT 6.5      K 3.7   CO2 27      BUN 21   CREATININE 1.0   ALKPHOS 148*   ALT 20   AST 56*   BILITOT 0.2     Lab Results   Component Value Date    WBC 9.06 2024    HGB 8.6 (L) 2024    HCT 27.5 (L) 2024    MCV 82 2024     (H) 2024     No results for input(s): "TSH", "FREET4" in the last 168 hours.  Lab Results   Component Value Date    HGBA1C >14.0 (H) 2024       Nutritional status:   Body mass index is 38.99 kg/m².  Lab Results   Component Value Date    ALBUMIN 1.4 (L) 2024    ALBUMIN 1.4 (L) 2024    ALBUMIN 1.5 (L) 2024     Lab Results   Component Value Date    PREALBUMIN 17 (L) 2014    PREALBUMIN 16 (L) 2014    PREALBUMIN 19 (L) 2014       Estimated Creatinine Clearance: 81.5 mL/min (based on SCr of 1 mg/dL).    Accu-Checks  Recent Labs     " 03/31/24  0837 03/31/24  1231 03/31/24  1637 03/31/24  2051 04/01/24  0222 04/01/24  0744 04/01/24  1157 04/01/24  1650 04/01/24  2100 04/02/24  0302   POCTGLUCOSE 196* 145* 154* 203* 126* 142* 179* 134* 172* 181*       Current Medications and/or Treatments Impacting Glycemic Control  Immunotherapy:    Immunosuppressants       None          Steroids:   Hormones (From admission, onward)      Start     Stop Route Frequency Ordered    03/28/24 1128  melatonin tablet 6 mg         -- Oral Nightly PRN 03/28/24 1028          Pressors:    Autonomic Drugs (From admission, onward)      None          Hyperglycemia/Diabetes Medications:   Antihyperglycemics (From admission, onward)      Start     Stop Route Frequency Ordered    03/31/24 1130  insulin aspart U-100 pen 6-8 Units         -- SubQ 3 times daily with meals 03/31/24 0845    03/31/24 0900  insulin detemir U-100 (Levemir) pen 17 Units         -- SubQ 2 times daily 03/31/24 0845    03/29/24 2346  insulin aspart U-100 pen 0-10 Units         -- SubQ Before meals, nightly and at 0200 PRN 03/29/24 2247            ASSESSMENT and PLAN    ID  * Osteomyelitis of left lower extremity  Managed per primary team  Optimize BG control to improve wound healing        Endocrine  Diabetic ketoacidosis without coma associated with type 2 diabetes mellitus  Resolved.         Insulin dependent type 2 diabetes mellitus  BG goal: 140-180    - Levemir (Insulin Detemir) 17 units BID   - Novolog (Insulin Aspart) 6-8 units with meals (Administer    6    units if patient eats 25-50% of meal, administer    8    units if patient eats > 50% of meal.) and prn for BG excursions [facility] SSI (150/25)  - BG checks AC/HS/0200   - Hypoglycemia protocol in place     ** Please notify Endocrine for any change and/or advance in diet**  ** Please call Endocrine for any BG related issues **     Discharge Planning:   TBD. Please notify endocrinology prior to discharge.         Chaak Dominguez DNP,  SKYLARP  Endocrinology  [facility] - Telemetry Stepdown

## 2024-04-02 NOTE — PLAN OF CARE
Problem: Adult Inpatient Plan of Care  Goal: Plan of Care Review  Outcome: Ongoing, Progressing  Goal: Patient-Specific Goal (Individualized)  Outcome: Ongoing, Progressing  Goal: Absence of Hospital-Acquired Illness or Injury  Outcome: Ongoing, Progressing  Goal: Optimal Comfort and Wellbeing  Outcome: Ongoing, Progressing  Goal: Readiness for Transition of Care  Outcome: Ongoing, Progressing     Problem: Diabetes Comorbidity  Goal: Blood Glucose Level Within Targeted Range  Outcome: Ongoing, Progressing     Problem: Adjustment to Illness (Sepsis/Septic Shock)  Goal: Optimal Coping  Outcome: Ongoing, Progressing     Problem: Bleeding (Sepsis/Septic Shock)  Goal: Absence of Bleeding  Outcome: Ongoing, Progressing     Problem: Glycemic Control Impaired (Sepsis/Septic Shock)  Goal: Blood Glucose Level Within Desired Range  Outcome: Ongoing, Progressing     Problem: Infection Progression (Sepsis/Septic Shock)  Goal: Absence of Infection Signs and Symptoms  Outcome: Ongoing, Progressing     Problem: Infection  Goal: Absence of Infection Signs and Symptoms  Outcome: Ongoing, Progressing     Problem: Impaired Wound Healing  Goal: Optimal Wound Healing  Outcome: Ongoing, Progressing     Problem: Skin Injury Risk Increased  Goal: Skin Health and Integrity  Outcome: Ongoing, Progressing     Problem: Fall Injury Risk  Goal: Absence of Fall and Fall-Related Injury  Outcome: Ongoing, Progressing  Intervention: Promote Injury-Free Environment  Flowsheets (Taken 4/2/2024 9404)  Safety Promotion/Fall Prevention:   assistive device/personal item within reach   bed alarm set   Fall Risk reviewed with patient/family   Fall Risk signage in place   side rails raised x 3   instructed to call staff for mobility     Problem: Fluid and Electrolyte Imbalance (Acute Kidney Injury/Impairment)  Goal: Fluid and Electrolyte Balance  Outcome: Ongoing, Progressing     Problem: Oral Intake Inadequate (Acute Kidney Injury/Impairment)  Goal:  Optimal Nutrition Intake  Outcome: Ongoing, Progressing     Problem: Renal Function Impairment (Acute Kidney Injury/Impairment)  Goal: Effective Renal Function  Outcome: Ongoing, Progressing     POC reviewed. Address questions and concerns, AAOX4. Sinus mary lou. Sams replaced due to urinary retention. Staples to left stump. Encourage reposition.  No s/s of discomfort or distress. Call light in reach.

## 2024-04-02 NOTE — ASSESSMENT & PLAN NOTE
BG goal: 140-180    - Levemir (Insulin Detemir) 17 units BID   - Novolog (Insulin Aspart) 6-8 units with meals (Administer    6    units if patient eats 25-50% of meal, administer    8    units if patient eats > 50% of meal.) and prn for BG excursions [facility] SSI (150/25)  - BG checks AC/HS/0200   - Hypoglycemia protocol in place     ** Please notify Endocrine for any change and/or advance in diet**  ** Please call Endocrine for any BG related issues **     Discharge Planning:   TBD. Please notify endocrinology prior to discharge.

## 2024-04-02 NOTE — ASSESSMENT & PLAN NOTE
Insulin mgmt per endocrine    Patient's FSGs are uncontrolled due to hyperglycemia on current medication regimen.  Last A1c reviewed-   Lab Results   Component Value Date    HGBA1C >14.0 (H) 03/05/2024     Most recent fingerstick glucose reviewed-   Recent Labs   Lab 04/01/24  2100 04/02/24  0302 04/02/24  0825 04/02/24  1151   POCTGLUCOSE 172* 181* 170* 143*       Current correctional scale  Low  Increase anti-hyperglycemic dose as follows-   Antihyperglycemics (From admission, onward)    Start     Stop Route Frequency Ordered    03/31/24 1130  insulin aspart U-100 pen 6-8 Units         -- SubQ 3 times daily with meals 03/31/24 0845    03/31/24 0900  insulin detemir U-100 (Levemir) pen 17 Units         -- SubQ 2 times daily 03/31/24 0845    03/29/24 2346  insulin aspart U-100 pen 0-10 Units         -- SubQ Before meals, nightly and at 0200 PRN 03/29/24 2247        Hold Oral hypoglycemics while patient is in the hospital.    - Endocrine's following an making insulin adjustments as needed.

## 2024-04-02 NOTE — PT/OT/SLP PROGRESS
Physical Therapy Co-Treatment    Patient Name:  Saji Castañeda   MRN:  [id number]    Recommendations:     Discharge Recommendations: Moderate Intensity Therapy  Discharge Equipment Recommendations: lift device, hospital bed  Barriers to discharge: Decreased caregiver support and Increased skilled assistance required    Assessment:   Co-evaluation/treatment performed due to patient's multiple deficits requiring two skilled therapists to appropriately and safely assess patient's strength, endurance, functional mobility, and ADL performance while facilitating functional tasks in addition to accommodating for patient's activity tolerance and medical acuity.    Saji Castañeda is a 74 y.o. male admitted with a medical diagnosis of Osteomyelitis of left lower extremity.  He presents with the following impairments/functional limitations: weakness, pain, impaired endurance, impaired self care skills, impaired functional mobility, impaired balance, decreased lower extremity function, decreased upper extremity function, decreased ROM, impaired coordination, impaired skin, edema, impaired joint extensibility, impaired muscle length. Patient demonstrating good motivation to participate in PT/OT co-treat this date, with fairly good response to completed activities and provided education. Patient pre-medicated prior to session, which provided some pain relief during completed functional mobility. Patient continues to require significant assistance for all mobility this date, however noted improvements in patient's bed mobility sequencing throughout a larger ROM, UE/Trunk>LE. Session emphasis on sitting balance, posturing/positioning, and improving activity tolerance this date. Writing therapist additionally addressed patient's impaired L hip flex/add muscular length, up to patient's tolerance. At this time, patient demonstrates that they will greatly benefit from moderate intensity skilled physical therapy services post-acutely  "exhibited by decreased independence with self-care and functional mobility.    Rehab Prognosis: Fair; patient would benefit from acute skilled PT services to address these deficits and reach maximum level of function.    Recent Surgery: Procedure(s) (LRB):  AMPUTATION, ABOVE KNEE (Left) 6 Days Post-Op    Plan:     During this hospitalization, patient to be seen 4 x/week to address the identified rehab impairments via therapeutic activities, therapeutic exercises, neuromuscular re-education and progress toward the following goals:    Plan of Care Expires:  04/28/24    Subjective     Chief Complaint: Pain  Patient/Family Comments/goals: None stated  Pain/Comfort:  Pain Rating 1: 5/10  Location - Side 1: Left  Location - Orientation 1: generalized  Location 1:  (AKA)  Pain Addressed 1: Pre-medicate for activity, Reposition, Distraction, Cessation of Activity, Nurse notified  Pain Rating Post-Intervention 1: other (see comments)      Objective:     Communicated with RN prior to session.  Patient found Patient found HOB elevated, with significant trunk lean to R  with telemetry, pulse ox (continuous), peripheral IV, fung catheter (Midline Catheter, Ace wrap) upon PT entry to room.     General Precautions: Standard, fall, contact, diabetic   Orthopedic Precautions:LLE non weight bearing, RLE weight bearing as tolerated   Braces: Darco shoe (R) and AKA protection brace (L)   Body mass index is 38.99 kg/m².  Oxygen Device: Room Air  Vitals: BP (!) 159/69 (BP Location: Left arm, Patient Position: Lying)   Pulse 83   Temp 98.2 °F (36.8 °C) (Oral)   Resp 14   Ht 5' 9" (1.753 m)   Wt 119.7 kg (264 lb)   SpO2 100%   BMI 38.99 kg/m²      Functional Mobility:  Bed Mobility: Cuing required for breakdown of complex motor sequence into single steps  Rolling Left: x2, MaxAx2 with HOB Flat. Facilitation of cross-body reaching  Rolling Right: x2, MaxAx2 with HOB Flat. Facilitation of cross-body reaching  Boosting: TotalAx3 with " HOB Flat and in trendelenburg position  Supine>Sit: x1, TotalAx2 with HOB Elevated  Sit>Supine: x1, TotalAx2 with HOB Elevated    Balance: Cuing for corrective WS to midline positioning, cervical extension + forward gaze, and L/R/BUE assistance  Sidelying: Assistance primarily at hips & BLEs related to sustained positioning.   Left: TotalAx2 - MaxAx2  Right: TotalAx2 - MaxAx2  Static Sitting:  MaxA-Min . Decreased assistance with pt providing BUE assistance via bed rail/footboard. Observed strong postero-lateral lean to R, with significant cuing & facilitation for corrective WS.  Dynamic Sitting: MaxA  Total Time Sitting: ~15mins EOB    AM-PAC 6 CLICK MOBILITY  Turning over in bed (including adjusting bedclothes, sheets and blankets)?: 2  Sitting down on and standing up from a chair with arms (e.g., wheelchair, bedside commode, etc.): 1  Moving from lying on back to sitting on the side of the bed?: 2  Moving to and from a bed to a chair (including a wheelchair)?: 1  Need to walk in hospital room?: 1  Climbing 3-5 steps with a railing?: 1  Basic Mobility Total Score: 8     Treatment & Education:  Patient participated in the following activities:  Hip extension Stretch - LLE  R [id number]. Hold time & pressure limited d/t pain. Minimal benefit noted 2/2 pt tensing up  Supine: [id number]. Hold time & pressure limited d/t pain.    Increased time required secondary to pericare & RN needing to change wound dressings due to bowel incontinence    Patient Education Provided on:  The role of physical therapy and how the patient can benefit from skilled services  The negative effects of prolonged bed rest/sedentary behavior, along with the importance of OOB activity & patient participation with PT  The importance of contacting RN, via call light, for mobility throughout the day  Pt white board updated with current therapists name and level of mobility assistance needed.     Patient Verbalized understanding of all topics  touched on this date. All patient questions answered within the PT scope of practice    Patient left HOB elevated with all lines intact, call button in reach, and RN present & notified. Discussed L AKA positioning and balancing LLE elevation w/ resting position to facilitate low amplitude-high duration stretch.    GOALS:   Multidisciplinary Problems       Physical Therapy Goals          Problem: Physical Therapy    Goal Priority Disciplines Outcome Goal Variances Interventions   Physical Therapy Goal     PT, PT/OT Ongoing, Progressing     Description: Goals to be met by: 24    Patient will increase functional independence with mobility by performin. Supine to sit with Maximum Assistance  2. Sit to supine with Maximum Assistance  3. Rolling to Left and Right with Maximum Assistance  4. Sitting at edge of bed 10 minutes with Moderate Assistance  5. Lower extremity exercise program x15 reps per handout, with assistance as needed                         Time Tracking:     PT Received On: 24  PT Start Time: 1248     PT Stop Time: 1331  PT Total Time (min): 43 min     Billable Minutes: Therapeutic Activity 15 and Neuromuscular Re-education 28    Treatment Type: Treatment  PT/PTA: PT     Number of PTA visits since last PT visit: 0     2024

## 2024-04-02 NOTE — ASSESSMENT & PLAN NOTE
Creatine stable for now. BMP reviewed- noted Estimated Creatinine Clearance: 81.5 mL/min (based on SCr of 1 mg/dL). according to latest data. Based on current GFR, CKD stage is stage 3 - GFR 30-59.  Monitor UOP and serial BMP and adjust therapy as needed. Renally dose meds. Avoid nephrotoxic medications and procedures.

## 2024-04-02 NOTE — ASSESSMENT & PLAN NOTE
Patient's anemia is currently uncontrolled. Has received 2 units of PRBCs on 3/23 and 3/16 . Etiology likely d/t chronic blood loss and Iron deficiency. Receiving 1u on 3/26 preoperatively    Current CBC reviewed-   Lab Results   Component Value Date    HGB 8.9 (L) 04/02/2024    HCT 29.4 (L) 04/02/2024     Monitor serial CBC and transfuse if patient becomes hemodynamically unstable, symptomatic or H/H drops below 7/21.

## 2024-04-02 NOTE — ASSESSMENT & PLAN NOTE
Patient with Persistent (7 days or more) atrial fibrillation which is uncontrolled currently with    . Patient is currently in sinus rhythm.[id number] Score: 4. . Anticoagulation indicated. Anticoagulation done with heparin/eliquis .    - ac with eliquis 5 BID resumed 3/30

## 2024-04-02 NOTE — SUBJECTIVE & OBJECTIVE
"Interval HPI:   Overnight events: No acute events overnight. Patient in room 8078/8078 A. Blood glucose stable. BG at goal on current insulin regimen (SSI, prandial, and basal insulin ). Steroid use- None. 6 Days Post-Op  Renal function- Normal   Vasopressors-  None       Endocrine will continue to follow and manage insulin orders inpatient.         Diet diabetic  Calorie     Eatin%  Nausea: No  Hypoglycemia and intervention: No  Fever: No  TPN and/or TF: No    BP (!) 153/67 (BP Location: Left arm, Patient Position: Lying)   Pulse (!) 52   Temp 98.5 °F (36.9 °C) (Oral)   Resp 18   Ht 5' 9" (1.753 m)   Wt 119.7 kg (264 lb)   SpO2 100%   BMI 38.99 kg/m²     Labs Reviewed and Include    Recent Labs   Lab 24  1254   *   CALCIUM 8.6*   ALBUMIN 1.4*   PROT 6.5      K 3.7   CO2 27      BUN 21   CREATININE 1.0   ALKPHOS 148*   ALT 20   AST 56*   BILITOT 0.2     Lab Results   Component Value Date    WBC 9.06 2024    HGB 8.6 (L) 2024    HCT 27.5 (L) 2024    MCV 82 2024     (H) 2024     No results for input(s): "TSH", "FREET4" in the last 168 hours.  Lab Results   Component Value Date    HGBA1C >14.0 (H) 2024       Nutritional status:   Body mass index is 38.99 kg/m².  Lab Results   Component Value Date    ALBUMIN 1.4 (L) 2024    ALBUMIN 1.4 (L) 2024    ALBUMIN 1.5 (L) 2024     Lab Results   Component Value Date    PREALBUMIN 17 (L) 2014    PREALBUMIN 16 (L) 2014    PREALBUMIN 19 (L) 2014       Estimated Creatinine Clearance: 81.5 mL/min (based on SCr of 1 mg/dL).    Accu-Checks  Recent Labs     24  0837 24  1231 24  1637 24  2051 24  0222 24  0744 24  1157 24  1650 24  2100 24  0302   POCTGLUCOSE 196* 145* 154* 203* 126* 142* 179* 134* 172* 181*       Current Medications and/or Treatments Impacting Glycemic Control  Immunotherapy:  "   Immunosuppressants       None          Steroids:   Hormones (From admission, onward)      Start     Stop Route Frequency Ordered    03/28/24 1128  melatonin tablet 6 mg         -- Oral Nightly PRN 03/28/24 1028          Pressors:    Autonomic Drugs (From admission, onward)      None          Hyperglycemia/Diabetes Medications:   Antihyperglycemics (From admission, onward)      Start     Stop Route Frequency Ordered    03/31/24 1130  insulin aspart U-100 pen 6-8 Units         -- SubQ 3 times daily with meals 03/31/24 0845    03/31/24 0900  insulin detemir U-100 (Levemir) pen 17 Units         -- SubQ 2 times daily 03/31/24 0845    03/29/24 2346  insulin aspart U-100 pen 0-10 Units         -- SubQ Before meals, nightly and at 0200 PRN 03/29/24 0817

## 2024-04-02 NOTE — ASSESSMENT & PLAN NOTE
Resolving    Patient with acute kidney injury/acute renal failure likely due to pre-renal azotemia due to dehydration and post-obstructive d/t urinary retention  YNES is currently stable. Baseline creatinine  1.1  - Labs reviewed- Renal function/electrolytes with Estimated Creatinine Clearance: 81.5 mL/min (based on SCr of 1 mg/dL). according to latest data. Monitor urine output and serial BMP and adjust therapy as needed. Avoid nephrotoxins and renally dose meds for GFR listed above.    Creatinine 2.1 on admit, baseline around 1.1  - prerenal vs obstructive    Lab Results   Component Value Date    CREATININE 1.0 04/02/2024       Plan:   4/2 failed voiding trial w/ pt retaining >1.5L urine on scan, fung replaced  - Strict I&Os and daily weights   - Daily BMP  - Trend sCr  - Avoid nephrotoxic agents such as NSAIDs, ACEi, ARBs and IV radiocontrast.  - Renally dose meds to current GFR.  - Maintain MAP > 65.  - No indications for HD at this time.   - Maintain electrolytes at goal: Mg > 2, Phos > 3, K > 4.

## 2024-04-02 NOTE — PLAN OF CARE
[facility] - Telemetry Stepdown  Discharge Reassessment    Primary Care Provider: [facility] -    Expected Discharge Date: 4/5/2024    Reassessment (most recent)       Discharge Reassessment - 04/02/24 1546          Discharge Reassessment    Assessment Type Discharge Planning Reassessment     Did the patient's condition or plan change since previous assessment? No     Discharge Plan discussed with: Sibling     Name(s) and Number(s) Kandy (sister) [phone] and (niece) Karen [phone]     Communicated OXANA with patient/caregiver Date not available/Unable to determine     Discharge Plan A Skilled Nursing Facility     Discharge Plan B Skilled Nursing Facility     DME Needed Upon Discharge  --   TBD       Post-Acute Status    Post-Acute Authorization Placement   SNF    Post-Acute Placement Status Referrals Sent   [facility] has accepted pt    Discharge Delays None known at this time                   Pt not medically clear. Once pt is clear, plan is to d/c to SNF. Pt was accepted by [facility] per Bryce [phone] at facility (updated notes has been sent to provider via [employer]).   QUINCY contacted pt niece- Karen [phone] and pt sister- Kandy [phone] to discuss denials and [facility] acceptance. Pt sister prefer pt to be closer to home but understand and has agreed with [facility] (treatment team notified). QUINCY provided pt niece Eleazar Go number.     3:48pm: QUINCY contacted Bryce [phone] at [facility] to provide update on family accepting facility and to provide pt niece and sister number; no answer, left voicemail.     Discharge Plan A and Plan B have been determined by review of patient's clinical status, future medical and therapeutic needs, and coverage/benefits for post-acute care in coordination with multidisciplinary team members.      Viviane Ray, AMAYA   [facility]    Case Management Dept  [phone]

## 2024-04-02 NOTE — ASSESSMENT & PLAN NOTE
Resolved, negative DVT US 3/31    Patient has swelling and pain in the right arm and some swelling in the left arm as well    -US b/l upper extremity for DVT showed clot  - transitioned from heparin to eliquis 3/30  - Increasing diuresis

## 2024-04-02 NOTE — ASSESSMENT & PLAN NOTE
Resolved w/ source control    Patient with Proteus and enterobacter on leg cultures collected by Podiatry with concern for acute on chronic osteo. Fungal culture grew fusarium. He has started spiking fever and still is not agreeable to amputation, will do infectious work up to look out for source.    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 Can discontinue antimicrobials 24-48 hours after surgery as long term treatment is not indicated.

## 2024-04-02 NOTE — ASSESSMENT & PLAN NOTE
Febrile on 4/1 to 100.6. Small right mid lung opacity seen on CXR. Could be HAP vs UTI from Sams vs atelectasis in setting of post of fever after AKA on 3/27. Will trend fever. Not hypotensive. No leukocytosis. New upper extremity DVTs ruled out.     - Blood cx repeated  - Procal pending  - f/u RIP, respiratory culture, UA, MRSA PCR -> all negative to date 4/2  - Sams removed -> replaced d/t retention  - will cover empirically with cefepime, vanc d/c'ed s/p negative MRSA PCR

## 2024-04-02 NOTE — ASSESSMENT & PLAN NOTE
Chronic, uncontrolled. Latest blood pressure and vitals reviewed-     Temp:  [97.8 °F (36.6 °C)-99.4 °F (37.4 °C)]   Pulse:  [47-62]   Resp:  [16-20]   BP: (139-172)/(64-76)   SpO2:  [95 %-100 %] .   Home meds for hypertension were reviewed and noted below.   Hypertension Medications               furosemide (LASIX) 40 MG tablet Take 20 mg by mouth.    hydrALAZINE (APRESOLINE) 100 MG tablet Take 1 tablet (100 mg total) by mouth every 8 (eight) hours.    isosorbide dinitrate (ISORDIL) 10 MG tablet Take 1 tablet (10 mg total) by mouth 3 (three) times daily.    NIFEdipine (PROCARDIA-XL) 60 MG (OSM) 24 hr tablet Take 1 tablet (60 mg total) by mouth once daily.            While in the hospital, will manage blood pressure as follows; Adjust home antihypertensive regimen as follows- will keep nifedipine - increased to 90     Will utilize p.r.n. blood pressure medication only if patient's blood pressure greater than 180/110 and he develops symptoms such as worsening chest pain or shortness of breath.

## 2024-04-02 NOTE — PLAN OF CARE
Problem: Adult Inpatient Plan of Care  Goal: Plan of Care Review  Outcome: Ongoing, Progressing     Problem: Diabetes Comorbidity  Goal: Blood Glucose Level Within Targeted Range  Outcome: Ongoing, Progressing     Problem: Adjustment to Illness (Sepsis/Septic Shock)  Goal: Optimal Coping  Outcome: Ongoing, Progressing     Problem: Bleeding (Sepsis/Septic Shock)  Goal: Absence of Bleeding  Outcome: Ongoing, Progressing     Problem: Glycemic Control Impaired (Sepsis/Septic Shock)  Goal: Blood Glucose Level Within Desired Range  Outcome: Ongoing, Progressing     Problem: Infection Progression (Sepsis/Septic Shock)  Goal: Absence of Infection Signs and Symptoms  Outcome: Ongoing, Progressing     Problem: Nutrition Impaired (Sepsis/Septic Shock)  Goal: Optimal Nutrition Intake  Outcome: Ongoing, Progressing     Problem: Infection  Goal: Absence of Infection Signs and Symptoms  Outcome: Ongoing, Progressing     Problem: Impaired Wound Healing  Goal: Optimal Wound Healing  Outcome: Ongoing, Progressing     Problem: Skin Injury Risk Increased  Goal: Skin Health and Integrity  Outcome: Ongoing, Progressing     Problem: Fall Injury Risk  Goal: Absence of Fall and Fall-Related Injury  Outcome: Ongoing, Progressing     Problem: Fluid and Electrolyte Imbalance (Acute Kidney Injury/Impairment)  Goal: Fluid and Electrolyte Balance  Outcome: Ongoing, Progressing     Problem: Oral Intake Inadequate (Acute Kidney Injury/Impairment)  Goal: Optimal Nutrition Intake  Outcome: Ongoing, Progressing     Problem: Renal Function Impairment (Acute Kidney Injury/Impairment)  Goal: Effective Renal Function  Outcome: Ongoing, Progressing       Pt. A&Ox4. Denies any chest pain or SOB. VSS. Intermittent combativeness upon reposition, education provided on importance w/ intermittent understanding.  Wound care provided. Blood sugar closely monitored. Incontinence care provided. Safety precaution maintained.

## 2024-04-02 NOTE — PT/OT/SLP PROGRESS
Occupational Therapy   Co-Treatment with PT  Co-treatment performed due to patient's multiple deficits requiring two skilled therapists  to appropriately and safely assess patient's strength and endurance while facilitating functional tasks in addition to accommodating for patient's activity tolerance.      Name: Saji Castañeda  MRN: [id number]  Admitting Diagnosis:  Osteomyelitis of left lower extremity  6 Days Post-Op    Recommendations:     Discharge Recommendations: Moderate Intensity Therapy  Discharge Equipment Recommendations:  lift device, hospital bed  Barriers to discharge:  Other (Comment) (extensive assistance required for ADLs/mobility)    Assessment:     Saji Castañeda is a 74 y.o. male with a medical diagnosis of Osteomyelitis of left lower extremity.  He presents with the following  performance deficits affecting function are weakness, pain, decreased coordination, impaired balance, impaired self care skills, decreased lower extremity function, decreased ROM, edema, impaired skin, decreased upper extremity function, gait instability, impaired endurance, impaired functional mobility, decreased safety awareness.   Pt requires significant assistance for bed mobility tasks requiring 3-person assistance to scoot towards HOB. Pt demonstrated static and dynamic sitting EOB ~15 minutes with varying levels of assistance 2/2 R posterior leaning and laterally weight-shifting to the R and L. Edema noted of R UE.    Rehab Prognosis:  Fair; patient would benefit from acute skilled OT services to address these deficits and reach maximum level of function.       Plan:     Patient to be seen 2 x/week to address the above listed problems via self-care/home management, therapeutic activities, therapeutic exercises, neuromuscular re-education  Plan of Care Expires: 04/11/24  Plan of Care Reviewed with: patient    Subjective     Chief Complaint: L AKA pain  Patient/Family Comments/goals: Pt agreeable to tx  session  Pain/Comfort:  Pain Rating 1: 5/10  Location - Orientation 1: generalized  Location 1:  (s/p L AKA)  Pain Addressed 1: Pre-medicate for activity, Reposition, Distraction, Cessation of Activity, Nurse notified    Objective:     Communicated with: nurse (Yulissa) prior to session.  Patient found supine with telemetry, pressure relief boots, fung catheter (ace bandage wrap loosely wrapped s/p L AKA, purple wedge on R side, edema R UE) upon OT entry to room.  A client care conference was completed by the OTR and the TAYLOR prior to treatment by the OTR to discuss the patient's POC and current status.     General Precautions: Standard, fall, contact, diabetic    Orthopedic Precautions:LLE non weight bearing, RLE weight bearing as tolerated  Braces:  (Darco shoe)  Respiratory Status: Room air     Occupational Performance:     Bed Mobility:    Patient completed Rolling/Turning to Left with  maximal assistance, 2 persons, with side rail, and draw sheet  Verbal cues provided for hand placement  Patient completed Rolling/Turning to Right with maximal assistance, 2 persons, with side rail, and draw sheet  Verbal cues provided for hand placement  Patient completed Scooting/Bridging with total assistance, 3 persons (KATHLEEN, PT, and nurse), and draw sheet   Patient completed Supine to Sit with total assistance and 2 persons  Patient completed Sit to Supine with total assistance and 2 persons     Functional Mobility/Transfers:  Not assessed this date    Activities of Daily Living:  Total A for natalie-rectal hygiene with assistance of nurse 2/2 pt soiled from BM and increased time required  NurseYulissa, provided dressing changes to sacral region 2/2 soiled from BM.  Pt positioned in R side-lying with total A x 2 for buttocks during natalie-rectal and dressing changes      [facility] 6 Click ADL: 11    Treatment & Education:  Pt performed bed mobility, functional mobility/transfers, and ADLs as documented above.   Pt performed  static/dynamic sitting EOB ~15 minutes with varying levels of assistance, Max A - Min A to improve sitting tolerance,  static/dynamic balance, and R UE ROM exercises.   Therapist provided verbal cues for pt to lift up his head and to weight shift to L & R.  Pt performed R UE ROM exercises seated EOB following therapist's verbal instructions ~7-8 reps ea:  AAROM R shoulder flexion  AA/AROM R shoulder abduction/adduction  AROM R wrist flexion/extension with forearm supported by therapist  R composite finger flexion/extension  B scapula elevation/depression  R scapula protraction/retraction (shoulder row)    Pt educated and instructed on the following:  OT POC  Role of TAYLOR  Use of call bell for all assistance  Addressed questions and /or concerns within TAYLOR scope of practice  Pt verbalized understanding     Patient left supine HOB elevated with all lines intact, call button in reach, nurse (Yulissa) present in room upon exiting Pt's room. Informed nurse of ace bandage wrap was not properly adhered/wrapped on stump of L AKA.    GOALS:   Multidisciplinary Problems       Occupational Therapy Goals          Problem: Occupational Therapy    Goal Priority Disciplines Outcome Interventions   Occupational Therapy Goal     OT, PT/OT Ongoing, Progressing    Description: Goals to be met by:04-11-24    Patient will increase functional independence with ADLs by performing:  Groom: set up A  Feeding: Set Up A  Rolling bilaterally Mod A with rail  Supine to sit: max A  Sit EOB with Max A                         Time Tracking:     OT Date of Treatment: 04/02/24  OT Start Time: 1248  OT Stop Time: 1330  OT Total Time (min): 42 min    Billable Minutes:Self Care/Home Management 12  Therapeutic Activity 15  Therapeutic Exercise 15    OT/KATHLEEN: KATHLEEN     Number of KATHLEEN visits since last OT visit: 1 4/2/2024

## 2024-04-02 NOTE — PROGRESS NOTES
[facility] - Telemetry Stepdown  Wound Care    Patient Name:  Saji Castañeda   MRN:  [id number]  Date: 4/2/2024  Diagnosis: Osteomyelitis of left lower extremity    History:     Past Medical History:   Diagnosis Date    Arthritis     legs    Bacteremia due to Gram-negative bacteria 3/23/2021    Diabetes mellitus     Diabetes mellitus, type 2     Hyperlipidemia     Hypertension     Osteomyelitis     Palliative care encounter 5/24/2023       Social History     Socioeconomic History    Marital status: Single   Tobacco Use    Smoking status: Never    Smokeless tobacco: Never   Substance and Sexual Activity    Alcohol use: No     Comment: occassional    Drug use: No    Sexual activity: Not Currently   Social History Narrative    Not currently working; lives with family     Social Determinants of Health     Financial Resource Strain: Medium Risk (3/5/2024)    Overall Financial Resource Strain (CARDIA)     Difficulty of Paying Living Expenses: Somewhat hard   Food Insecurity: No Food Insecurity (3/5/2024)    Hunger Vital Sign     Worried About Running Out of Food in the Last Year: Never true     Ran Out of Food in the Last Year: Never true   Transportation Needs: No Transportation Needs (3/5/2024)    PRAPARE - Transportation     Lack of Transportation (Medical): No     Lack of Transportation (Non-Medical): No   Physical Activity: Inactive (3/5/2024)    Exercise Vital Sign     Days of Exercise per Week: 0 days     Minutes of Exercise per Session: 0 min   Stress: No Stress Concern Present (3/5/2024)    [employer] [place] [employer] - Occupational Stress Questionnaire     Feeling of Stress : Only a little   Social Connections: Socially Isolated (3/5/2024)    Social Connection and Isolation Panel [NHANES]     Frequency of Communication with Friends and Family: More than three times a week     Frequency of Social Gatherings with Friends and Family: Patient declined     Attends Faith Services: Never     Active Member  of Clubs or Organizations: No     Attends Club or Organization Meetings: Never     Marital Status: Never    Housing Stability: Low Risk  (3/5/2024)    Housing Stability Vital Sign     Unable to Pay for Housing in the Last Year: No     Number of Places Lived in the Last Year: 1     Unstable Housing in the Last Year: No       Precautions:     Allergies as of 03/05/2024    (No Known Allergies)       WOC Assessment Details/Treatment     Patient seen for wound care consultation.   Reviewed chart for this encounter.   See Flow Sheet for findings.      RECOMMENDATIONS: Patient's primary RN at bedside and agreeable to inpatient wound care. Patient is known to inpatient wound care and currently being followed for bilateral buttocks and left greater trochanter wounds. Bilateral buttocks wounds noted to have scant serosanguineous drainage. Wound beds covered in eschar. Wounds improving with current wound care. Wounds are not malodorous. Continue current wound care orders.    Left greater trochanter wound noted to improved. Partial slough on wound bed. Current wound care orders slowly providing autolytic debridement of wound. Scant serosanguineous drainage. Continue current wound care orders.     Patient is currently on immerse bed for pressure redistribution and microclimate. No other issues or concerns at this time. Will follow up 4/9/2024 or sooner if needed.    Discussed POC with patient and primary nurse.   See EMR for orders & patient education.    Discussed nutrition and the role of protein in wound healing with the patient. Instructed patient to optimize protein for wound healing.    Bedside nursing to continue care & monitoring.  Bedside nursing to maintain pressure injury prevention interventions.          04/02/24 1120   WOCN Assessment   WOCN Total Time (mins) 45   Visit Date 04/02/24   Visit Time 1120   Consult Type Follow Up   Intervention assessed;changed;applied;chart review;coordination of care;orders    Teaching on-going        Altered Skin Integrity 03/05/24 0949 Left Buttocks #6 Ulceration Partial thickness tissue loss. Shallow open ulcer with a red or pink wound bed, without slough. Intact or Open/Ruptured Serum-filled blister.   Date First Assessed/Time First Assessed: 03/05/24 0949   Altered Skin Integrity Present on Admission - Did Patient arrive to the hospital with altered skin?: yes  Side: Left  Location: Buttocks  Wound Number: #6  Primary Wound Type: Ulceration  Descri...   Wound Image    Dressing Appearance Clean   Drainage Amount Scant   Drainage Characteristics/Odor Serosanguineous   Appearance Pink;Red;Tan   Care Cleansed with:;Antimicrobial agent   Dressing Removed;Applied;Gauze, wet to dry   Dressing Change Due 04/02/24        Altered Skin Integrity 03/06/24 1000 Left dorsal Greater trochanter   Date First Assessed/Time First Assessed: 03/06/24 1000   Altered Skin Integrity Present on Admission - Did Patient arrive to the hospital with altered skin?: yes  Side: Left  Orientation: dorsal  Location: Greater trochanter  Is this injury device rel...   Wound Image    Dressing Appearance Moist drainage   Drainage Amount Scant   Drainage Characteristics/Odor Serosanguineous   Appearance Pink;Red;Tan   Care Cleansed with:;Antimicrobial agent;Applied:   Dressing Removed;Applied;Foam   Dressing Change Due 04/03/24     Orders placed.   Trip LAMBERTN, RN  04/02/2024

## 2024-04-02 NOTE — PHARMACY MED REC
"      Admission Medication History     The home medication history was taken by Pooja Rey.    You may go to "Admission" then "Reconcile Home Medications" tabs to review and/or act upon these items.     The home medication list has been updated by the Pharmacy department.   Please read ALL comments highlighted in yellow.   Please address this information as you see fit.    Feel free to contact us if you have any questions or require assistance.    PATIENT REPORTS BEING NONCOMPLIANT WITH ALL MEDICATIONS.    Medications listed below were obtained from: Patient/family and [employer] Medications   Medication Sig    acetaminophen (TYLENOL) 325 MG tablet   Take 650 mg by mouth every 6 (six) hours as needed for temperature greater than.    acidophilus-pectin, citrus 100 million cell-10 mg Cap   Take 1 capsule by mouth 2 (two) times a day.    ascorbic acid, vitamin C, (VITAMIN C) 500 MG tablet   Take 500 mg by mouth 2 (two) times daily.    B COMPLEX-VITAMIN B12 tablet   Take 1 tablet by mouth once daily.    BD AUTOSHIELD DUO PEN NEEDLE 30 gauge x 3/16" Ndle       BD ULTRA-FINE ADITYA PEN NEEDLE 32 gauge x 5/32" Ndle   Use four times daily with meals and nightly.      blood sugar diagnostic (CONTOUR TEST STRIPS) Strp   Inject 1 strip into the skin 2 (two) times daily before meals.    clopidogreL (PLAVIX) 75 mg tablet   Take 1 tablet (75 mg total) by mouth once daily.  Last filled on 12/29/23 for 90/90 DS    gabapentin (NEURONTIN) 300 MG capsule   Take 300 mg by mouth 2 (two) times daily.  Last filled on 2/12/24 for 180/60 DS    insulin aspart U-100 (NOVOLOG) 100 unit/mL (3 mL) InPn pen   Inject 6 Units into the skin 3 (three) times daily with meals.  Last filled on 12/22/23 for 16 DS    MICROLET LANCET Misc       multivitamin (THERAGRAN) per tablet   Take 1 tablet by mouth once daily.    NIFEdipine (PROCARDIA-XL) 60 MG (OSM) 24 hr tablet   Take 1 tablet (60 mg total) by mouth once daily.  Last filled " "on 11/3/23 for 90 DS    pantoprazole (PROTONIX) 40 MG tablet   Take 40 mg by mouth once daily. Before breakfast    pen needle, diabetic 32 gauge x 5/32" Ndle   use as directed with insulin    rosuvastatin (CRESTOR) 40 MG Tab   Take 40 mg by mouth once daily.  Last filled on 3/24/24 for 90/90 DS    tamsulosin (FLOMAX) 0.4 mg Cap   Take 0.4 mg by mouth once daily.  Last filled on 3/24/24 for 90/90 DS    triamcinolone acetonide 0.025% (KENALOG) 0.025 % Oint   Apply topically 2 (two) times daily as needed to affected area.    [DISCONTINUED] apixaban (ELIQUIS) 5 mg Tab   Take 1 tablet (5 mg total) by mouth 2 (two) times daily.  Last filled on 1/16/24 for 180/90 Ds      [DISCONTINUED] diphenhydrAMINE (BENADRYL) 25 mg capsule   No directions listed on the patients medication list.    [DISCONTINUED] furosemide (LASIX) 40 MG tablet   Take 20 mg by mouth.    [DISCONTINUED] glipiZIDE (GLUCOTROL) 10 MG tablet   Take 20 mg by mouth 2 (two) times a day.    [DISCONTINUED] hydrALAZINE (APRESOLINE) 100 MG tablet   Take 1 tablet (100 mg total) by mouth every 8 (eight) hours.  Last filled on 12/29/23 for 270/90 DS    [DISCONTINUED] isosorbide dinitrate (ISORDIL) 10 MG tablet   Take 1 tablet (10 mg total) by mouth 3 (three) times daily.  Last filled on 3/8/24 for 180/60 DS    [DISCONTINUED] LANTUS SOLOSTAR U-100 INSULIN glargine 100 units/mL SubQ pen   Inject 45 Units into the skin once daily.  Last filled on 1/2/24 for 40 DS    [DISCONTINUED] miconazole NITRATE 2 % (MICOTIN) 2 % top powder   Apply topically 2 (two) times daily.    [DISCONTINUED] oxyCODONE 5 mg TbOr   Take 1 tablet by mouth every 6 (six) hours as needed for pain. (max 5 daily)    [DISCONTINUED] sodium hypochlorite 0.5 % (DAKIN'S SOLUTION) external solution   Apply topically once daily.       Potential issues to be addressed PRIOR TO DISCHARGE  Please discuss with the patient barriers to adherence with medication treatment plans  Patient requires education regarding " drug therapies     Pooja Rey  EXT [phone]            .

## 2024-04-02 NOTE — ASSESSMENT & PLAN NOTE
Patient developed new urinary retention.     -Fung's placed by urology - Dc'd on 4/1. Voiding trial failed 4/1, fung replaced  -Flomax  -Renal ultrasound showed no hydronephrosis  - Making adequate urine with fung, c/f hematuria which developed 3/25 and resolved with repositioning of fung. See urology note from same date  - voiding trial prior to DC. If unable to void, urology f/u outpatient

## 2024-04-02 NOTE — PLAN OF CARE
Problem: Physical Therapy  Goal: Physical Therapy Goal  Description: Goals to be met by: 24    Patient will increase functional independence with mobility by performin. Supine to sit with Maximum Assistance  2. Sit to supine with Maximum Assistance  3. Rolling to Left and Right with Maximum Assistance  4. Sitting at edge of bed 10 minutes with Moderate Assistance  5. Lower extremity exercise program x15 reps per handout, with assistance as needed    Outcome: Ongoing, Progressing

## 2024-04-02 NOTE — NURSING
patient HR sustain 48 b/p-172//76 asymptomatic asleep. Spoke with Vj with med team 4. Call HR get to 40. Place order for EKG. WCTM

## 2024-04-02 NOTE — ASSESSMENT & PLAN NOTE
Bradycardia on tele noted night of 4/1 with HR of 48, pt asymptomatic. EKG c/f complete AVB. Tele morning of 4/2 showing junctional escape rhythm with HR 60s-70s. Electrophysiology consulted, pt has leadless pacemaker in place already set at 50bpm. Will interrogate device, no acute cardiology intervention indicated at this time.

## 2024-04-02 NOTE — ASSESSMENT & PLAN NOTE
"Source control achieved with proximal amputation. Per ID can discontinue antimicrobials 24-48 hours after surgery as long term treatment is not indicated    This patient does have evidence of infective focus  My overall impression is sepsis.  Source: Skin and Soft Tissue (location left leg)  Antibiotics given-   Antibiotics (72h ago, onward)    Start     Stop Route Frequency Ordered    04/01/24 1315  ceFEPIme (MAXIPIME) 2 g in dextrose 5 % in water (D5W) 100 mL IVPB (MB+)         -- IV Every 8 hours (non-standard times) 04/01/24 1201    03/31/24 2100  mupirocin 2 % ointment         04/05/24 2059 Nasl 2 times daily 03/31/24 1326        Latest lactate reviewed-  No results for input(s): "LACTATE", "POCLAC" in the last 72 hours.    Organ dysfunction indicated by Acute kidney injury    Fluid challenge Actual Body weight- Patient will receive 30ml/kg actual body weight to calculate fluid bolus for treatment of septic shock.     Post- resuscitation assessment No - Post resuscitation assessment not needed       Will Not start Pressors-     - See osteo    "

## 2024-04-02 NOTE — PLAN OF CARE
SW attempted to spk w/ pt sister Kandy [phone] to provide update, no answer left voicemail. SW called pt niece Karen [phone] to provide update. I provided facility denials per [facility]port and provider [facility] who is willing to accept pt; Karen stated she will reach out to pt siblings and provide update and callback on their decision. SW provided [facility] number to Karen.     Denials: All state (care needs exceed current capacity)   List of hospitals in [place]     Discharge Plan A and Plan B have been determined by review of patient's clinical status, future medical and therapeutic needs, and coverage/benefits for post-acute care in coordination with multidisciplinary team members.    AMAYA Krishnan   [facility]    Case Management Dept  [phone]

## 2024-04-03 LAB
ALBUMIN SERPL BCP-MCNC: 1.2 G/DL (ref 3.5–5.2)
ALP SERPL-CCNC: 213 U/L (ref 55–135)
ALT SERPL W/O P-5'-P-CCNC: 33 U/L (ref 10–44)
ANION GAP SERPL CALC-SCNC: 3 MMOL/L (ref 8–16)
AST SERPL-CCNC: 68 U/L (ref 10–40)
BASOPHILS # BLD AUTO: 0.03 K/UL (ref 0–0.2)
BASOPHILS NFR BLD: 0.3 % (ref 0–1.9)
BILIRUB SERPL-MCNC: 0.2 MG/DL (ref 0.1–1)
BUN SERPL-MCNC: 15 MG/DL (ref 8–23)
CALCIUM SERPL-MCNC: 8.2 MG/DL (ref 8.7–10.5)
CHLORIDE SERPL-SCNC: 109 MMOL/L (ref 95–110)
CO2 SERPL-SCNC: 29 MMOL/L (ref 23–29)
CREAT SERPL-MCNC: 0.9 MG/DL (ref 0.5–1.4)
DIFFERENTIAL METHOD BLD: ABNORMAL
EOSINOPHIL # BLD AUTO: 0.5 K/UL (ref 0–0.5)
EOSINOPHIL NFR BLD: 5.3 % (ref 0–8)
ERYTHROCYTE [DISTWIDTH] IN BLOOD BY AUTOMATED COUNT: 16.3 % (ref 11.5–14.5)
EST. GFR  (NO RACE VARIABLE): >60 ML/MIN/1.73 M^2
GLUCOSE SERPL-MCNC: 71 MG/DL (ref 70–110)
HCT VFR BLD AUTO: 27.6 % (ref 40–54)
HGB BLD-MCNC: 8.4 G/DL (ref 14–18)
IMM GRANULOCYTES # BLD AUTO: 0.03 K/UL (ref 0–0.04)
IMM GRANULOCYTES NFR BLD AUTO: 0.3 % (ref 0–0.5)
LYMPHOCYTES # BLD AUTO: 0.7 K/UL (ref 1–4.8)
LYMPHOCYTES NFR BLD: 7.9 % (ref 18–48)
MAGNESIUM SERPL-MCNC: 1.9 MG/DL (ref 1.6–2.6)
MCH RBC QN AUTO: 25.2 PG (ref 27–31)
MCHC RBC AUTO-ENTMCNC: 30.4 G/DL (ref 32–36)
MCV RBC AUTO: 83 FL (ref 82–98)
MONOCYTES # BLD AUTO: 0.7 K/UL (ref 0.3–1)
MONOCYTES NFR BLD: 7.4 % (ref 4–15)
NEUTROPHILS # BLD AUTO: 7 K/UL (ref 1.8–7.7)
NEUTROPHILS NFR BLD: 78.8 % (ref 38–73)
NRBC BLD-RTO: 0 /100 WBC
OHS QRS DURATION: 86 MS
OHS QTC CALCULATION: 432 MS
PLATELET # BLD AUTO: 545 K/UL (ref 150–450)
PMV BLD AUTO: 9.5 FL (ref 9.2–12.9)
POCT GLUCOSE: 107 MG/DL (ref 70–110)
POCT GLUCOSE: 120 MG/DL (ref 70–110)
POCT GLUCOSE: 121 MG/DL (ref 70–110)
POCT GLUCOSE: 81 MG/DL (ref 70–110)
POCT GLUCOSE: 90 MG/DL (ref 70–110)
POCT GLUCOSE: 99 MG/DL (ref 70–110)
POTASSIUM SERPL-SCNC: 4 MMOL/L (ref 3.5–5.1)
PROT SERPL-MCNC: 5.5 G/DL (ref 6–8.4)
RBC # BLD AUTO: 3.33 M/UL (ref 4.6–6.2)
SODIUM SERPL-SCNC: 141 MMOL/L (ref 136–145)
WBC # BLD AUTO: 8.9 K/UL (ref 3.9–12.7)

## 2024-04-03 PROCEDURE — 85025 COMPLETE CBC W/AUTO DIFF WBC: CPT

## 2024-04-03 PROCEDURE — 83735 ASSAY OF MAGNESIUM: CPT

## 2024-04-03 PROCEDURE — A4216 STERILE WATER/SALINE, 10 ML: HCPCS

## 2024-04-03 PROCEDURE — 25000003 PHARM REV CODE 250

## 2024-04-03 PROCEDURE — 63600175 PHARM REV CODE 636 W HCPCS

## 2024-04-03 PROCEDURE — 80053 COMPREHEN METABOLIC PANEL: CPT

## 2024-04-03 PROCEDURE — 27000207 HC ISOLATION

## 2024-04-03 PROCEDURE — 97530 THERAPEUTIC ACTIVITIES: CPT

## 2024-04-03 PROCEDURE — 99232 SBSQ HOSP IP/OBS MODERATE 35: CPT | Mod: ,,, | Performed by: NURSE PRACTITIONER

## 2024-04-03 PROCEDURE — 94761 N-INVAS EAR/PLS OXIMETRY MLT: CPT

## 2024-04-03 PROCEDURE — 20600001 HC STEP DOWN PRIVATE ROOM

## 2024-04-03 RX ORDER — HYDROMORPHONE HYDROCHLORIDE 1 MG/ML
0.5 INJECTION, SOLUTION INTRAMUSCULAR; INTRAVENOUS; SUBCUTANEOUS EVERY 6 HOURS PRN
Status: DISCONTINUED | OUTPATIENT
Start: 2024-04-03 | End: 2024-04-05 | Stop reason: HOSPADM

## 2024-04-03 RX ORDER — FUROSEMIDE 20 MG/1
20 TABLET ORAL DAILY
Qty: 30 TABLET | Refills: 11
Start: 2024-04-03 | End: 2025-04-03

## 2024-04-03 RX ORDER — INSULIN ASPART 100 [IU]/ML
INJECTION, SOLUTION INTRAVENOUS; SUBCUTANEOUS
Refills: 0
Start: 2024-04-03 | End: 2024-04-05 | Stop reason: HOSPADM

## 2024-04-03 RX ORDER — NIFEDIPINE 90 MG/1
90 TABLET, EXTENDED RELEASE ORAL DAILY
Qty: 30 TABLET | Refills: 11
Start: 2024-04-04 | End: 2025-04-04

## 2024-04-03 RX ORDER — FUROSEMIDE 20 MG/1
20 TABLET ORAL DAILY
Qty: 30 TABLET | Refills: 11 | Status: SHIPPED | OUTPATIENT
Start: 2024-04-03 | End: 2024-04-03

## 2024-04-03 RX ORDER — LEVOFLOXACIN 750 MG/1
750 TABLET ORAL DAILY
Qty: 2 TABLET | Refills: 0
Start: 2024-04-04 | End: 2024-04-05

## 2024-04-03 RX ORDER — LEVOFLOXACIN 750 MG/1
750 TABLET ORAL DAILY
Status: COMPLETED | OUTPATIENT
Start: 2024-04-03 | End: 2024-04-05

## 2024-04-03 RX ORDER — INSULIN DETEMIR 100 [IU]/ML
12 INJECTION, SOLUTION SUBCUTANEOUS 2 TIMES DAILY
Qty: 21.6 ML | Refills: 3
Start: 2024-04-03 | End: 2025-04-03

## 2024-04-03 RX ADMIN — CEFEPIME 2 G: 2 INJECTION, POWDER, FOR SOLUTION INTRAVENOUS at 05:04

## 2024-04-03 RX ADMIN — ATORVASTATIN CALCIUM 40 MG: 40 TABLET, FILM COATED ORAL at 09:04

## 2024-04-03 RX ADMIN — NIFEDIPINE 90 MG: 30 TABLET, FILM COATED, EXTENDED RELEASE ORAL at 09:04

## 2024-04-03 RX ADMIN — HYDROMORPHONE HYDROCHLORIDE 0.5 MG: 0.5 INJECTION, SOLUTION INTRAMUSCULAR; INTRAVENOUS; SUBCUTANEOUS at 11:04

## 2024-04-03 RX ADMIN — COLLAGENASE SANTYL: 250 OINTMENT TOPICAL at 09:04

## 2024-04-03 RX ADMIN — APIXABAN 5 MG: 5 TABLET, FILM COATED ORAL at 08:04

## 2024-04-03 RX ADMIN — Medication 10 ML: at 05:04

## 2024-04-03 RX ADMIN — MUPIROCIN: 20 OINTMENT TOPICAL at 09:04

## 2024-04-03 RX ADMIN — ACETAMINOPHEN 650 MG: 325 TABLET ORAL at 09:04

## 2024-04-03 RX ADMIN — PANTOPRAZOLE SODIUM 40 MG: 40 TABLET, DELAYED RELEASE ORAL at 09:04

## 2024-04-03 RX ADMIN — Medication 10 ML: at 08:04

## 2024-04-03 RX ADMIN — FUROSEMIDE 40 MG: 10 INJECTION, SOLUTION INTRAVENOUS at 09:04

## 2024-04-03 RX ADMIN — INSULIN ASPART 6 UNITS: 100 INJECTION, SOLUTION INTRAVENOUS; SUBCUTANEOUS at 01:04

## 2024-04-03 RX ADMIN — Medication 10 ML: at 12:04

## 2024-04-03 RX ADMIN — TRAZODONE HYDROCHLORIDE 25 MG: 50 TABLET ORAL at 10:04

## 2024-04-03 RX ADMIN — APIXABAN 5 MG: 5 TABLET, FILM COATED ORAL at 09:04

## 2024-04-03 RX ADMIN — LEVOFLOXACIN 750 MG: 750 TABLET, FILM COATED ORAL at 09:04

## 2024-04-03 RX ADMIN — TAMSULOSIN HYDROCHLORIDE 0.4 MG: 0.4 CAPSULE ORAL at 09:04

## 2024-04-03 RX ADMIN — Medication 10 ML: at 11:04

## 2024-04-03 RX ADMIN — GABAPENTIN 300 MG: 300 CAPSULE ORAL at 08:04

## 2024-04-03 RX ADMIN — GABAPENTIN 300 MG: 300 CAPSULE ORAL at 09:04

## 2024-04-03 RX ADMIN — CLOPIDOGREL BISULFATE 75 MG: 75 TABLET ORAL at 09:04

## 2024-04-03 NOTE — PT/OT/SLP PROGRESS
Physical Therapy Treatment    Patient Name:  Saji Castañeda   MRN:  [id number]    Recommendations:     Discharge Recommendations: Moderate Intensity Therapy  Discharge Equipment Recommendations: lift device, hospital bed  Barriers to discharge: Decreased caregiver support and Increased skilled assistance required    Assessment:     Saji Castañeda is a 74 y.o. male admitted with a medical diagnosis of Osteomyelitis of left lower extremity.  He presents with the following impairments/functional limitations: weakness, pain, impaired cardiopulmonary response to activity, impaired endurance, impaired functional mobility, decreased lower extremity function, decreased upper extremity function, decreased ROM, impaired coordination, impaired skin, edema, impaired joint extensibility, impaired muscle length, orthopedic precautions. Despite increased fatigue and pain, patient demonstrating fairly good motivation to participate in PT session, with good response to completed activities and provided education. Session emphasis on bed mobility to improve functional strength; endurance; & task sequencing. Progress observed in these factors evidenced by decreased physical assistance required & continued improvement of sequencing through greater ROM with rolling & sustained side lying. Furthermore, writing therapist observed increased unprompted attempts for postural re-adjustment into midline (Trunk>LEs) and for pressure relief, with improved efficacy. Session emphasis also placed on L hip positioning & strength, as tolerated by patient. Patient continues to demonstrate that they will greatly benefit from moderate intensity skilled physical therapy services post-acutely, exhibited by decreased independence with self-care and functional mobility.     Rehab Prognosis: Fair; patient would benefit from acute skilled PT services to address these deficits and reach maximum level of function.    Recent Surgery: Procedure(s)  "(LRB):  AMPUTATION, ABOVE KNEE (Left) 7 Days Post-Op    Plan:     During this hospitalization, patient to be seen 4 x/week to address the identified rehab impairments via therapeutic activities, therapeutic exercises, neuromuscular re-education and progress toward the following goals:    Plan of Care Expires:  04/28/24    Subjective     Chief Complaint: Pain  Patient/Family Comments/goals: Agreeable to treatment session  Pain/Comfort:  Pain Rating 1: Intensity not provided  Location - Side 1: Left  Location - Orientation 1: generalized  Location 1:  (AKA)  Pain Addressed 1: Pre-medicate for activity, Reposition, Distraction, Cessation of Activity, Nurse notified  Pain Rating Post-Intervention 1: Intensity not provided      Objective:     Communicated with RN prior to session.  Patient found HOB elevated with telemetry, pulse ox (continuous), fung catheter (Ace wrap, stepwise bed) upon PT entry to room. Rehab Tech Zeke assisting throughout session    General Precautions: Standard, fall, contact, diabetic   Orthopedic Precautions:LLE non weight bearing, RLE weight bearing as tolerated   Braces:  (Darco shoe (R) and AKA protection brace (L))   Body mass index is 38.99 kg/m².  Oxygen Device: Room Air  Vitals: /63 (BP Location: Left arm, Patient Position: Lying)   Pulse 82   Temp 98.9 °F (37.2 °C) (Oral)   Resp 16   Ht 5' 9" (1.753 m)   Wt 119.7 kg (264 lb)   SpO2 100%   BMI 38.99 kg/m²      Functional Mobility:  Bed Mobility: Cuing required for breakdown of complex motor sequencing into single steps  Rolling Left: Pt deferred this date 2/2 pain & fatigue  Rolling Right:   Supine>S/L: x2, ModAx2 with  mildly elevated . Facilitation of cross-body reaching.  S/L>Supine: x2, Maile with mildly elevated. Assistance predominantly at LEs  Scooting:   Supine Lateral (R): TotalAx2    Balance:   Sidelying: Pt utilizing contralateral UE to assist w/ maintenance of trunk positioning; Assistance primarily at hips & " BLEs related to sustained positioning.  Left: Not assessed this date  Right: ModAx2    AM-PAC 6 CLICK MOBILITY  Turning over in bed (including adjusting bedclothes, sheets and blankets)?: 2  Sitting down on and standing up from a chair with arms (e.g., wheelchair, bedside commode, etc.): 1  Moving from lying on back to sitting on the side of the bed?: 2  Moving to and from a bed to a chair (including a wheelchair)?: 1  Need to walk in hospital room?: 1  Climbing 3-5 steps with a railing?: 1  Basic Mobility Total Score: 8     Treatment & Education:  Patient participated in the following exercises & activities:  Supine  L SLR: x2 Limited AROM observed  L Hip Abd: x2, Minimal AROM observed w/ trace contraction palpated  L Hip Ext: x3 Isometric against bed. Extensive cuing for technique provided. Trace contraction palpated  L Hip Flexor Stretch: [id number]. Pressure limited d/t pain  L Hip Adductor Stretch: [id number]. Pressure limited d/t pain  R Sidelying  L Hip Ext: x5, ModAx2. Extensive cuing for technique provided. Limited AROM observed w/ trace contraction palpated; Pt unable to achieve neutral hip extension    PT facilitated midline positioning of head, trunk, & BLEs, utilzing pillows. Additionally positioned BLEs flat (vs. Inclined) to promote low amplitude-high duration stretch of LLE.    Increased time to notify RN Tonio) of HR<50bpm ; RN consenting to continuation of session, reporting patient has pacemaker.    Patient Education Provided on:  The role of physical therapy and how the patient can benefit from skilled services  The negative effects of prolonged bed rest/sedentary behavior   The importance of body in bed body posturing and repositioning for prevention of skin breakdown, contracture development, & additional aforementioned negative effects  The importance of patient participation with PT  The importance of contacting RN, via call light, for mobility throughout the day  Pt white board updated with  current therapists name and level of mobility assistance needed.     Patient Verbalized understanding of all topics touched on this date. All patient questions answered within the PT scope of practice    Patient left HOB elevated with all lines intact, call button in reach, and RN notified..    GOALS:   Multidisciplinary Problems       Physical Therapy Goals          Problem: Physical Therapy    Goal Priority Disciplines Outcome Goal Variances Interventions   Physical Therapy Goal     PT, PT/OT Ongoing, Progressing     Description: Goals to be met by: 24    Patient will increase functional independence with mobility by performin. Supine to sit with Maximum Assistance  2. Sit to supine with Maximum Assistance  3. Rolling to Left and Right with Maximum Assistance  4. Sitting at edge of bed 10 minutes with Moderate Assistance  5. Lower extremity exercise program x15 reps per handout, with assistance as needed                         Time Tracking:     PT Received On: 24  PT Start Time: 1206     PT Stop Time: 1236  PT Total Time (min): 30 min     Billable Minutes: Therapeutic Activity 30    Treatment Type: Treatment  PT/PTA: PT     Number of PTA visits since last PT visit: 0     2024

## 2024-04-03 NOTE — ASSESSMENT & PLAN NOTE
Creatine stable for now. BMP reviewed- noted Estimated Creatinine Clearance: 90.6 mL/min (based on SCr of 0.9 mg/dL). according to latest data. Based on current GFR, CKD stage is stage 3 - GFR 30-59.  Monitor UOP and serial BMP and adjust therapy as needed. Renally dose meds. Avoid nephrotoxic medications and procedures.

## 2024-04-03 NOTE — ASSESSMENT & PLAN NOTE
Chronic, uncontrolled. Latest blood pressure and vitals reviewed-     Temp:  [98.2 °F (36.8 °C)-99.4 °F (37.4 °C)]   Pulse:  [48-83]   Resp:  [14-19]   BP: (135-159)/(62-69)   SpO2:  [96 %-100 %] .   Home meds for hypertension were reviewed and noted below.   Hypertension Medications               furosemide (LASIX) 40 MG tablet Take 20 mg by mouth.    hydrALAZINE (APRESOLINE) 100 MG tablet Take 1 tablet (100 mg total) by mouth every 8 (eight) hours.    isosorbide dinitrate (ISORDIL) 10 MG tablet Take 1 tablet (10 mg total) by mouth 3 (three) times daily.    NIFEdipine (PROCARDIA-XL) 60 MG (OSM) 24 hr tablet Take 1 tablet (60 mg total) by mouth once daily.            While in the hospital, will manage blood pressure as follows; Adjust home antihypertensive regimen as follows- will keep nifedipine - increased to 90     Will utilize p.r.n. blood pressure medication only if patient's blood pressure greater than 180/110 and he develops symptoms such as worsening chest pain or shortness of breath.

## 2024-04-03 NOTE — ASSESSMENT & PLAN NOTE
Insulin mgmt per endocrine    Patient's FSGs are uncontrolled due to hyperglycemia on current medication regimen.  Last A1c reviewed-   Lab Results   Component Value Date    HGBA1C >14.0 (H) 03/05/2024     Most recent fingerstick glucose reviewed-   Recent Labs   Lab 04/02/24  1151 04/02/24  1647 04/02/24  2109 04/03/24  0211   POCTGLUCOSE 143* 83 109 99       Current correctional scale  Low  Increase anti-hyperglycemic dose as follows-   Antihyperglycemics (From admission, onward)    Start     Stop Route Frequency Ordered    04/03/24 0900  insulin detemir U-100 (Levemir) pen 15 Units         -- SubQ 2 times daily 04/03/24 0622    03/31/24 1130  insulin aspart U-100 pen 6-8 Units         -- SubQ 3 times daily with meals 03/31/24 0845    03/29/24 2346  insulin aspart U-100 pen 0-10 Units         -- SubQ Before meals, nightly and at 0200 PRN 03/29/24 2247        Hold Oral hypoglycemics while patient is in the hospital.    - Endocrine's following an making insulin adjustments as needed.

## 2024-04-03 NOTE — ASSESSMENT & PLAN NOTE
Failed voiding trial 4/1, fung replaced    Fung in placed, placed by urology  [place]

## 2024-04-03 NOTE — PROGRESS NOTES
[facility] - Telemetry [facility] Medicine  Progress Note    Patient Name: Saji Castañeda  MRN: [id number]  Patient Class: IP- Inpatient   Admission Date: 3/5/2024  Length of Stay: 29 days  Attending Physician: Jm Rosa MD  Primary Care Provider: [facility] -        Subjective:     Principal Problem:Osteomyelitis of left lower extremity        HPI:  Saji Castañeda is a 74 y.o. male with a medical history of DM2, HLD, HTN, chronic osteomyelitis of L heel, L TMA, PAD, hx of ESBL klebsiella, acinetobacter bacteremia, presenting with hyperglycemia. He presented to the ED via EMS and his POCT glucose on arrival was >500. Serum blood glucose 667 with anion gap of 17 and elevated ketones. Serum potassium 3.4, corrected sodium 149. Urinalysis significant for RBCs, glucosuria, and moderate bacteria and yeast. CBC revealed leukocytosis, elevated platelets, and mild anemia. CMP shows Na 135, K 3.4, BUN 29, Cr 2.1, Glucose 677, Lactate 4.61. BNP and troponin elevated. GFR 32.2. Insulin drip, IV fluids, zosyn, vancomycin, and potassium given.      He is unable to provide much history, but states that he has not been taking his home medications for at least a week. He reports shortness of breath, fatigue, and weakness for the last 6-7 days. He has chills and reports episodes of emesis. He denies abdominal pain, chest pain, palpitations, recent illness/infection, fevers, changes to bowel movements and urinary output. Patient lives with his brother and sister at home. He was last seen in the ED on 2/21 after a fall. He was hypoglycemic BGL 60 at that time which returned to normal after eating. He was also scheduled for a wound clinic appointment today 3/5 at [facility] with Dr. Wilson.        Overview/Hospital Course:  Wound care and cultures collected by Podiatry with recs for Vascular consult for amputation. Patient declined amputation. ID recommending IV Ertapenem for chronic osteomyelitis for 6 week duration  (EED: 4/15/24). With high wound care needs and IV antibiotics plan patient needing long-term placement. He is being treated with topical permethrin for bedbugs. Wound care has been dressing the wounds. 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 Still adjusting insulin to control sugars better. Giving fluids for YNES. Poor PO intake even with feeding assistance. LTAC authorization denied by [employer]. Waiting for SNF placement ([facility], awaiting auth and family to complete paperwork). Patient started spiking fever again and infectious work up done. ID re-consulted. Patient has been counseled multiple times that source control cannot be achieved without amputation. Patient agreeable to AKA scheduled for 3/27. Transitioned to heparin from eliquis. Fung placed for urinary rentention. Discussed new hematuria with urology, hematuria resolved with repositioning of fung. Pt to receive 1u PRBC preoperatively per vascular surgery. S/p L AKA on 3/27 with vascular surgery. ID rec 24-48h post-op abx now that source control is achieved, unless further concerned for infection. Delirium precautions + trazodone added for sleep hygiene, rescheduled lab times appropriately. Resuming heparin drip + plavix 3/29 per vascular surg. 3/30 transitioned to eliquis from heparin gtt. LUE swelling noted 3/31, repeat DVT US of upper extremities were negative for DVT's. Hgb downtrending at 6.8, will receive 1u PRBC, no overt signs of bleeding. Febrile on 4/1 overnight to 100.6, possibly atelectasis or HAP for which he received 1 dose ertapenem. Blood cx collected, CXR showing possible opacity in mid right lung. Started on vanc/cefepime for HAP. EKG overnight 4/1-4/2 c/f complete AVB. Discussed with electrophysiologists, pt has a leadless pacemaker implanted, no acute intervention indicated at this time. Cardiac device check showed functional pacemaker set to operate if HR reaches 40. Pt  switched to levaquin to complete abx course for pna, still asymptomatic. Medically stable for discharge to facility.    Interval History: NAEON. Pain controlled, no complaints. Pt improving      Objective:     Vital Signs (Most Recent):  Temp: 99 °F (37.2 °C) (04/03/24 0546)  Pulse: (!) 52 (04/03/24 0546)  Resp: 19 (04/03/24 0546)  BP: 135/62 (04/03/24 0546)  SpO2: 96 % (04/03/24 0546) Vital Signs (24h Range):  Temp:  [98.2 °F (36.8 °C)-99.4 °F (37.4 °C)] 99 °F (37.2 °C)  Pulse:  [48-83] 52  Resp:  [14-19] 19  SpO2:  [96 %-100 %] 96 %  BP: (135-159)/(62-69) 135/62     Weight: 119.7 kg (264 lb)  Body mass index is 38.99 kg/m².    Intake/Output Summary (Last 24 hours) at 4/3/2024 0730  Last data filed at 4/3/2024 0723  Gross per 24 hour   Intake 550 ml   Output 3150 ml   Net -2600 ml         Physical Exam  Constitutional:       General: He is not in acute distress.     Appearance: Normal appearance. He is well-developed. Ill appearance: chronically ill appearing.   HENT:      Head: Normocephalic and atraumatic.      Right Ear: External ear normal.      Left Ear: External ear normal.      Nose: Nose normal.   Eyes:      General:         Right eye: No discharge.         Left eye: No discharge.      Conjunctiva/sclera: Conjunctivae normal.   Cardiovascular:      Rate and Rhythm: Normal rate and regular rhythm.      Pulses: Normal pulses.   Pulmonary:      Effort: Pulmonary effort is normal. No respiratory distress.      Breath sounds: No stridor.   Abdominal:      General: Abdomen is flat.      Palpations: Abdomen is soft.      Tenderness: There is no abdominal tenderness.   Musculoskeletal:         General: Swelling and deformity present.      Cervical back: Normal range of motion.      Comments: S/p L-AKA, bandage CDI  UE swelling improving   Skin:     General: Skin is warm and dry.   Neurological:      General: No focal deficit present.      Mental Status: He is alert.             Significant Labs: All pertinent labs  within the past 24 hours have been reviewed.    Significant Imaging: I have reviewed all pertinent imaging results/findings within the past 24 hours.    Assessment/Plan:      * Osteomyelitis of left lower extremity  Resolved w/ source control    Patient with Proteus and enterobacter on leg cultures collected by Podiatry with concern for acute on chronic osteo. Fungal culture grew fusarium. He has started spiking fever and still is not agreeable to amputation, will do infectious work up to look out for source.    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 Can discontinue antimicrobials 24-48 hours after surgery as long term treatment is not indicated.       Pacemaker  Pt with leadless [employer] pacemaker in place, per device rep set to operate if his HR reaches 40      Fever  Febrile on 4/1 to 100.6. Small right mid lung opacity seen on CXR. Could be HAP vs UTI from Sams vs atelectasis in setting of post of fever after AKA on 3/27. Will trend fever. Not hypotensive. No leukocytosis. New upper extremity DVTs ruled out.     - Blood cx repeated  - Procal pending  - f/u RIP, respiratory culture, UA, MRSA PCR -> all negative to date 4/2  - Sams removed -> replaced d/t retention  - will cover empirically with cefepime, vanc d/c'ed s/p negative MRSA PCR --> switched to levaquin, will complete 5d course      Edema due to hypervolemia  Pt with developing extremity edema, partially d/t known RUE DVT which doesn't explain the LUE/RLE edema. Will trial diuresis, obtain echo, and DVT US for new LUE swelling which was negative.     - now on RA  - resolving with diuresis      Urinary retention  Patient developed new urinary retention.     -Sams's placed by urology - Dc'd on 4/1. Voiding trial failed 4/1,  fung replaced  -Flomax  -Renal ultrasound showed no hydronephrosis  - Making adequate urine with fung, c/f hematuria which developed 3/25 and resolved with repositioning of fung. See urology note from same date  - voiding trial prior to DC. If unable to void, urology f/u outpatient       BPH (benign prostatic hyperplasia)  Failed voiding trial 4/1, fung replaced    Fung in placed, placed by urology  Flomax daily      Localized swelling of right upper extremity  Resolved, negative DVT US 3/31    Patient has swelling and pain in the right arm and some swelling in the left arm as well    -US b/l upper extremity for DVT showed clot  - transitioned from heparin to eliquis 3/30  - Increasing diuresis     History of Bedbug bite with infection  Patient started on Permethrin daily for 5 days starting 3/12      Proteus infection  Abx d/c'ed 3/30    Was on ertapenem to cover proteus/enterobacter growing from leg wound; source control achieved s/p L AKA    Chronic anticoagulation  - See DVT      History of amputation of left high mid foot   See osteo      Stage 3b chronic kidney disease  Creatine stable for now. BMP reviewed- noted Estimated Creatinine Clearance: 90.6 mL/min (based on SCr of 0.9 mg/dL). according to latest data. Based on current GFR, CKD stage is stage 3 - GFR 30-59.  Monitor UOP and serial BMP and adjust therapy as needed. Renally dose meds. Avoid nephrotoxic medications and procedures.    Severe sepsis  Source control achieved with proximal amputation. Per ID can discontinue antimicrobials 24-48 hours after surgery as long term treatment is not indicated    This patient does have evidence of infective focus  My overall impression is sepsis.  Source: Skin and Soft Tissue (location left leg)  Antibiotics given-   Antibiotics (72h ago, onward)      Start     Stop Route Frequency Ordered    04/01/24 1315  ceFEPIme (MAXIPIME) 2 g in dextrose 5 % in water (D5W) 100 mL IVPB (MB+)         -- IV Every 8 hours  "(non-standard times) 04/01/24 1201    03/31/24 2100  mupirocin 2 % ointment         04/05/24 2059 Nasl 2 times daily 03/31/24 1326          Latest lactate reviewed-  No results for input(s): "LACTATE", "POCLAC" in the last 72 hours.    Organ dysfunction indicated by Acute kidney injury    Fluid challenge Actual Body weight- Patient will receive 30ml/kg actual body weight to calculate fluid bolus for treatment of septic shock.     Post- resuscitation assessment No - Post resuscitation assessment not needed       Will Not start Pressors-     - See osteo      Other hyperlipidemia  Continue home atorvastatin      Diabetic ketoacidosis without coma associated with type 2 diabetes mellitus  Resolved    Patient's FSGs are uncontrolled due to hyperglycemia on current medication regimen.  Last A1c reviewed-   Lab Results   Component Value Date    HGBA1C >14.0 (H) 03/05/2024     Most recent fingerstick glucose reviewed-   Recent Labs   Lab 04/02/24  1151 04/02/24  1647 04/02/24  2109 04/03/24  0211   POCTGLUCOSE 143* 83 109 99       Current correctional scale   LD SSI  Maintain anti-hyperglycemic dose as follows-   Antihyperglycemics (From admission, onward)      Start     Stop Route Frequency Ordered    04/03/24 0900  insulin detemir U-100 (Levemir) pen 15 Units         -- SubQ 2 times daily 04/03/24 0622    03/31/24 1130  insulin aspart U-100 pen 6-8 Units         -- SubQ 3 times daily with meals 03/31/24 0845    03/29/24 2346  insulin aspart U-100 pen 0-10 Units         -- SubQ Before meals, nightly and at 0200 PRN 03/29/24 2247          Hold Oral hypoglycemics while patient is in the hospital.  Will keep patient on DKA pathway with insulin drip and fluid with protocol in place for switching to subcutaneous insulin as anion gap closes.      DKA    - Resolved  - Continue insulin regimen and BG checks TIDWM and QHS and 2am  - patient tends to become hypoglycemic around dinnertime  - could be insulin stacking from breakfast " and lunch insulin aspart given poor PO intake despite assistance with feeding   - detemir 14 unit BID  - aspart scheduled w/ only breakfast and lunch and dinner with different units  -Inpatient consult to Endocrine    AVB (atrioventricular block)  Bradycardia on tele noted night of 4/1 with HR of 48, pt asymptomatic. EKG c/f complete AVB. Tele morning of 4/2 showing junctional escape rhythm with HR 60s-70s. Electrophysiology consulted, pt has leadless pacemaker in place already set at 50bpm. Will interrogate device, no acute cardiology intervention indicated at this time.       Paroxysmal atrial fibrillation  Patient with Persistent (7 days or more) atrial fibrillation which is uncontrolled currently with    . Patient is currently in sinus rhythm.[id number] Score: 4. . Anticoagulation indicated. Anticoagulation done with heparin/eliquis .    - ac with eliquis 5 BID resumed 3/30    Chronic deep vein thrombosis (DVT) of right upper extremity  Resolved    - eliquis transitioned to Heparin which was paused temporarily d/t concern of new hematuria which developed 3/25. Discussed with urology, the hematuria resolved with repositioning of the fung. Heparin resumed, but will temporarily be paused as patient needs preoperative blood transfusion and has minimal IV access d/t placement difficulties & limb alert on R-arm d/t DVT    - resumed eliquis 3/30     - 3/31 new LUE swelling c/f DVT, US bilateral negative for new DVT - continue eliquis 5 BID      Iron deficiency anemia  Patient's anemia is currently uncontrolled. Has received 2 units of PRBCs on 3/23 and 3/16 . Etiology likely d/t chronic blood loss and Iron deficiency. Receiving 1u on 3/26 preoperatively    Current CBC reviewed-   Lab Results   Component Value Date    HGB 8.4 (L) 04/03/2024    HCT 27.6 (L) 04/03/2024     Monitor serial CBC and transfuse if patient becomes hemodynamically unstable, symptomatic or H/H drops below 7/21.    Cellulitis of left lower  leg  Resolved w/ source control, see osteo      Peripheral arterial disease  Resumed plavix 3/29      YNES (acute kidney injury)  Resolved    Patient with acute kidney injury/acute renal failure likely due to pre-renal azotemia due to dehydration and post-obstructive d/t urinary retention  YNES is currently stable. Baseline creatinine  1.1  - Labs reviewed- Renal function/electrolytes with Estimated Creatinine Clearance: 90.6 mL/min (based on SCr of 0.9 mg/dL). according to latest data. Monitor urine output and serial BMP and adjust therapy as needed. Avoid nephrotoxins and renally dose meds for GFR listed above.    Creatinine 2.1 on admit, baseline around 1.1  - prerenal vs obstructive    Lab Results   Component Value Date    CREATININE 0.9 04/03/2024       Plan:   4/2 failed voiding trial w/ pt retaining >1.5L urine on scan, fung replaced  - Strict I&Os and daily weights   - Daily BMP  - Trend sCr  - Avoid nephrotoxic agents such as NSAIDs, ACEi, ARBs and IV radiocontrast.  - Renally dose meds to current GFR.  - Maintain MAP > 65.  - No indications for HD at this time.   - Maintain electrolytes at goal: Mg > 2, Phos > 3, K > 4.           Insulin dependent type 2 diabetes mellitus  Insulin mgmt per endocrine    Patient's FSGs are uncontrolled due to hyperglycemia on current medication regimen.  Last A1c reviewed-   Lab Results   Component Value Date    HGBA1C >14.0 (H) 03/05/2024     Most recent fingerstick glucose reviewed-   Recent Labs   Lab 04/02/24  1151 04/02/24  1647 04/02/24  2109 04/03/24  0211   POCTGLUCOSE 143* 83 109 99       Current correctional scale  Low  Increase anti-hyperglycemic dose as follows-   Antihyperglycemics (From admission, onward)      Start     Stop Route Frequency Ordered    04/03/24 0900  insulin detemir U-100 (Levemir) pen 15 Units         -- SubQ 2 times daily 04/03/24 0622    03/31/24 1130  insulin aspart U-100 pen 6-8 Units         -- SubQ 3 times daily with meals 03/31/24 0845     03/29/24 2346  insulin aspart U-100 pen 0-10 Units         -- SubQ Before meals, nightly and at 0200 PRN 03/29/24 2247          Hold Oral hypoglycemics while patient is in the hospital.    - Endocrine's following an making insulin adjustments as needed.    Essential hypertension  Chronic, uncontrolled. Latest blood pressure and vitals reviewed-     Temp:  [98.2 °F (36.8 °C)-99.4 °F (37.4 °C)]   Pulse:  [48-83]   Resp:  [14-19]   BP: (135-159)/(62-69)   SpO2:  [96 %-100 %] .   Home meds for hypertension were reviewed and noted below.   Hypertension Medications               furosemide (LASIX) 40 MG tablet Take 20 mg by mouth.    hydrALAZINE (APRESOLINE) 100 MG tablet Take 1 tablet (100 mg total) by mouth every 8 (eight) hours.    isosorbide dinitrate (ISORDIL) 10 MG tablet Take 1 tablet (10 mg total) by mouth 3 (three) times daily.    NIFEdipine (PROCARDIA-XL) 60 MG (OSM) 24 hr tablet Take 1 tablet (60 mg total) by mouth once daily.            While in the hospital, will manage blood pressure as follows; Adjust home antihypertensive regimen as follows- will keep nifedipine - increased to 90     Will utilize p.r.n. blood pressure medication only if patient's blood pressure greater than 180/110 and he develops symptoms such as worsening chest pain or shortness of breath.      VTE Risk Mitigation (From admission, onward)           Ordered     apixaban tablet 5 mg  2 times daily         03/30/24 1346     heparin 25,000 units in dextrose 5% 250 mL (100 units/mL) infusion HIGH INTENSITY nomogram - [facility]  Continuous        Question:  Begin at (units/kg/hr)  Answer:  18 03/20/24 2337                    Discharge Planning   OXANA: 4/5/2024     Code Status: Full Code   Is the patient medically ready for discharge?: No    Reason for patient still in hospital (select all that apply): Patient trending condition  Discharge Plan A: Skilled Nursing Facility   Discharge Delays: None known at this time              Jori Byrd  MD  Department of Hospital Medicine   [facility] - Telemetry Stepdown

## 2024-04-03 NOTE — SUBJECTIVE & OBJECTIVE
"Interval HPI:   Overnight events: No acute events overnight. Patient in room 8078/8078 A. Blood glucose stable. BG at and below goal on current insulin regimen (SSI, prandial, and basal insulin ). Steroid use- None. 7 Days Post-Op  Renal function- Normal   Vasopressors-  None       Endocrine will continue to follow and manage insulin orders inpatient.         Diet diabetic  Calorie     Eatin%  Nausea: No  Hypoglycemia and intervention: No  Fever: No  TPN and/or TF: No    /62 (BP Location: Left arm, Patient Position: Lying)   Pulse (!) 52   Temp 99 °F (37.2 °C) (Oral)   Resp 19   Ht 5' 9" (1.753 m)   Wt 119.7 kg (264 lb)   SpO2 96%   BMI 38.99 kg/m²     Labs Reviewed and Include    Recent Labs   Lab 24  1027   *   CALCIUM 8.3*   ALBUMIN 1.4*   PROT 6.2      K 4.3   CO2 26      BUN 17   CREATININE 1.0   ALKPHOS 128   ALT 25   AST 54*   BILITOT 0.2     Lab Results   Component Value Date    WBC 8.90 2024    HGB 8.4 (L) 2024    HCT 27.6 (L) 2024    MCV 83 2024     (H) 2024     No results for input(s): "TSH", "FREET4" in the last 168 hours.  Lab Results   Component Value Date    HGBA1C >14.0 (H) 2024       Nutritional status:   Body mass index is 38.99 kg/m².  Lab Results   Component Value Date    ALBUMIN 1.4 (L) 2024    ALBUMIN 1.4 (L) 2024    ALBUMIN 1.4 (L) 2024     Lab Results   Component Value Date    PREALBUMIN 17 (L) 2014    PREALBUMIN 16 (L) 2014    PREALBUMIN 19 (L) 2014       Estimated Creatinine Clearance: 81.5 mL/min (based on SCr of 1 mg/dL).    Accu-Checks  Recent Labs     24  0744 24  1157 24  1650 24  2100 24  0302 24  0825 24  1151 24  1647 24  2109 24  0211   POCTGLUCOSE 142* 179* 134* 172* 181* 170* 143* 83 109 99       Current Medications and/or Treatments Impacting Glycemic Control  Immunotherapy:  "   Immunosuppressants       None          Steroids:   Hormones (From admission, onward)      Start     Stop Route Frequency Ordered    03/28/24 1128  melatonin tablet 6 mg         -- Oral Nightly PRN 03/28/24 1028          Pressors:    Autonomic Drugs (From admission, onward)      None          Hyperglycemia/Diabetes Medications:   Antihyperglycemics (From admission, onward)      Start     Stop Route Frequency Ordered    04/03/24 0900  insulin detemir U-100 (Levemir) pen 15 Units         -- SubQ 2 times daily 04/03/24 0622    03/31/24 1130  insulin aspart U-100 pen 6-8 Units         -- SubQ 3 times daily with meals 03/31/24 0845    03/29/24 2346  insulin aspart U-100 pen 0-10 Units         -- SubQ Before meals, nightly and at 0200 PRN 03/29/24 7107

## 2024-04-03 NOTE — PROGRESS NOTES
[facility] - Telemetry Stepdown  Endocrinology  Progress Note    Admit Date: 3/5/2024     Reason for Consult: Management of T2DM, Hyperglycemia     Diabetes diagnosis year: > 25 years    Home Diabetes Medications:  Novolog 6 units w/ meals and Lantus 45 units nightly (states he is not taking).     How often checking glucose at home?  Not really checking    BG readings on regimen: 200's  Hypoglycemia on the regimen?  Yes (When he was taking the Lantus)  Missed doses on regimen?  Yes    Diabetes Complications include:     Hyperglycemia, Hypoglycemia , Diabetic chronic kidney disease     , Diabetic dermatitis, Foot ulcer  , and Periodontal disease    Complicating diabetes co morbidities:   HTN; HLD      HPI: Saji Castañeda is a 73 yo M with a history of Afib, T2D, HLD, HTN, chronic osteomyelitis of L heel, s/p L TMA, PID, ESBL Klebsiella and Acinetobacter bacteremia who was admitted after being found down at home and DKA. Endocrine consulted to manage hyperglycemia and type 2 diabetes.   Hemoglobin A1C   Date Value Ref Range Status   03/05/2024 >14.0 (H) 4.0 - 5.6 % Final     Comment:     ADA Screening Guidelines:  5.7-6.4%  Consistent with prediabetes  >or=6.5%  Consistent with diabetes    High levels of fetal hemoglobin interfere with the HbA1C  assay. Heterozygous hemoglobin variants (HbS, HgC, etc)do  not significantly interfere with this assay.   However, presence of multiple variants may affect accuracy.     09/13/2023 11.0 (H) 4.5 - 5.7 % Final   03/10/2023 8.4 (H) 4.0 - 5.6 % Final     Comment:     ADA Screening Guidelines:  5.7-6.4%  Consistent with prediabetes  >or=6.5%  Consistent with diabetes    High levels of fetal hemoglobin interfere with the HbA1C  assay. Heterozygous hemoglobin variants (HbS, HgC, etc)do  not significantly interfere with this assay.   However, presence of multiple variants may affect accuracy.     09/17/2022 9.9 (H) 4.0 - 5.6 % Final     Comment:     ADA Screening Guidelines:  5.7-6.4%   "Consistent with prediabetes  >or=6.5%  Consistent with diabetes    High levels of fetal hemoglobin interfere with the HbA1C  assay. Heterozygous hemoglobin variants (HbS, HgC, etc)do  not significantly interfere with this assay.   However, presence of multiple variants may affect accuracy.              Interval HPI:   Overnight events: No acute events overnight. Patient in room 8078/8078 A. Blood glucose stable. BG at and below goal on current insulin regimen (SSI, prandial, and basal insulin ). Steroid use- None. 7 Days Post-Op  Renal function- Normal   Vasopressors-  None       Endocrine will continue to follow and manage insulin orders inpatient.         Diet diabetic  Calorie     Eatin%  Nausea: No  Hypoglycemia and intervention: No  Fever: No  TPN and/or TF: No    /62 (BP Location: Left arm, Patient Position: Lying)   Pulse (!) 52   Temp 99 °F (37.2 °C) (Oral)   Resp 19   Ht 5' 9" (1.753 m)   Wt 119.7 kg (264 lb)   SpO2 96%   BMI 38.99 kg/m²     Labs Reviewed and Include    Recent Labs   Lab 24  1027   *   CALCIUM 8.3*   ALBUMIN 1.4*   PROT 6.2      K 4.3   CO2 26      BUN 17   CREATININE 1.0   ALKPHOS 128   ALT 25   AST 54*   BILITOT 0.2     Lab Results   Component Value Date    WBC 8.90 2024    HGB 8.4 (L) 2024    HCT 27.6 (L) 2024    MCV 83 2024     (H) 2024     No results for input(s): "TSH", "FREET4" in the last 168 hours.  Lab Results   Component Value Date    HGBA1C >14.0 (H) 2024       Nutritional status:   Body mass index is 38.99 kg/m².  Lab Results   Component Value Date    ALBUMIN 1.4 (L) 2024    ALBUMIN 1.4 (L) 2024    ALBUMIN 1.4 (L) 2024     Lab Results   Component Value Date    PREALBUMIN 17 (L) 2014    PREALBUMIN 16 (L) 2014    PREALBUMIN 19 (L) 2014       Estimated Creatinine Clearance: 81.5 mL/min (based on SCr of 1 mg/dL).    Accu-Checks  Recent Labs     " 04/01/24  0744 04/01/24  1157 04/01/24  1650 04/01/24  2100 04/02/24  0302 04/02/24  0825 04/02/24  1151 04/02/24  1647 04/02/24  2109 04/03/24  0211   POCTGLUCOSE 142* 179* 134* 172* 181* 170* 143* 83 109 99       Current Medications and/or Treatments Impacting Glycemic Control  Immunotherapy:    Immunosuppressants       None          Steroids:   Hormones (From admission, onward)      Start     Stop Route Frequency Ordered    03/28/24 1128  melatonin tablet 6 mg         -- Oral Nightly PRN 03/28/24 1028          Pressors:    Autonomic Drugs (From admission, onward)      None          Hyperglycemia/Diabetes Medications:   Antihyperglycemics (From admission, onward)      Start     Stop Route Frequency Ordered    04/03/24 0900  insulin detemir U-100 (Levemir) pen 15 Units         -- SubQ 2 times daily 04/03/24 0622    03/31/24 1130  insulin aspart U-100 pen 6-8 Units         -- SubQ 3 times daily with meals 03/31/24 0845    03/29/24 2346  insulin aspart U-100 pen 0-10 Units         -- SubQ Before meals, nightly and at 0200 PRN 03/29/24 2247            ASSESSMENT and PLAN    ID  * Osteomyelitis of left lower extremity  Managed per primary team  Optimize BG control to improve wound healing        Endocrine  Diabetic ketoacidosis without coma associated with type 2 diabetes mellitus  Resolved.         Insulin dependent type 2 diabetes mellitus  BG goal: 140-180    - Levemir (Insulin Detemir) 15 units BID (20% reduction due to fasting blood glucose below goal.)  - Novolog (Insulin Aspart) 6-8 units with meals (Administer    6    units if patient eats 25-50% of meal, administer    8    units if patient eats > 50% of meal.) and prn for BG excursions [facility] SSI (150/25)  - BG checks AC/HS/0200   - Hypoglycemia protocol in place     ** Please notify Endocrine for any change and/or advance in diet**  ** Please call Endocrine for any BG related issues **     Discharge Planning:   - Levemir 12 units BID  - Novolog 5 units with  meals+ SSI  - Novolog SSI   150 - 200 + 2 unit    201 - 250 + 4 units    251 - 300 + 6 units    301 - 350 + 8 units       > 350   + 10 units           Chaka Dominguez, OSCAR, FNP  Endocrinology  [facility] - Tele[facility] Stepdown

## 2024-04-03 NOTE — ASSESSMENT & PLAN NOTE
Febrile on 4/1 to 100.6. Small right mid lung opacity seen on CXR. Could be HAP vs UTI from Sams vs atelectasis in setting of post of fever after AKA on 3/27. Will trend fever. Not hypotensive. No leukocytosis. New upper extremity DVTs ruled out.     - Blood cx repeated  - Procal pending  - f/u RIP, respiratory culture, UA, MRSA PCR -> all negative to date 4/2  - Sams removed -> replaced d/t retention  - will cover empirically with cefepime, vanc d/c'ed s/p negative MRSA PCR --> switched to levaquin, will complete 5d course

## 2024-04-03 NOTE — SUBJECTIVE & OBJECTIVE
Interval History: NAEON. Pain controlled, no complaints. Pt improving      Objective:     Vital Signs (Most Recent):  Temp: 99 °F (37.2 °C) (04/03/24 0546)  Pulse: (!) 52 (04/03/24 0546)  Resp: 19 (04/03/24 0546)  BP: 135/62 (04/03/24 0546)  SpO2: 96 % (04/03/24 0546) Vital Signs (24h Range):  Temp:  [98.2 °F (36.8 °C)-99.4 °F (37.4 °C)] 99 °F (37.2 °C)  Pulse:  [48-83] 52  Resp:  [14-19] 19  SpO2:  [96 %-100 %] 96 %  BP: (135-159)/(62-69) 135/62     Weight: 119.7 kg (264 lb)  Body mass index is 38.99 kg/m².    Intake/Output Summary (Last 24 hours) at 4/3/2024 0730  Last data filed at 4/3/2024 0723  Gross per 24 hour   Intake 550 ml   Output 3150 ml   Net -2600 ml         Physical Exam  Constitutional:       General: He is not in acute distress.     Appearance: Normal appearance. He is well-developed. Ill appearance: chronically ill appearing.   HENT:      Head: Normocephalic and atraumatic.      Right Ear: External ear normal.      Left Ear: External ear normal.      Nose: Nose normal.   Eyes:      General:         Right eye: No discharge.         Left eye: No discharge.      Conjunctiva/sclera: Conjunctivae normal.   Cardiovascular:      Rate and Rhythm: Normal rate and regular rhythm.      Pulses: Normal pulses.   Pulmonary:      Effort: Pulmonary effort is normal. No respiratory distress.      Breath sounds: No stridor.   Abdominal:      General: Abdomen is flat.      Palpations: Abdomen is soft.      Tenderness: There is no abdominal tenderness.   Musculoskeletal:         General: Swelling and deformity present.      Cervical back: Normal range of motion.      Comments: S/p L-AKA, bandage CDI  UE swelling improving   Skin:     General: Skin is warm and dry.   Neurological:      General: No focal deficit present.      Mental Status: He is alert.             Significant Labs: All pertinent labs within the past 24 hours have been reviewed.    Significant Imaging: I have reviewed all pertinent imaging  results/findings within the past 24 hours.

## 2024-04-03 NOTE — ASSESSMENT & PLAN NOTE
Resolved    Patient's FSGs are uncontrolled due to hyperglycemia on current medication regimen.  Last A1c reviewed-   Lab Results   Component Value Date    HGBA1C >14.0 (H) 03/05/2024     Most recent fingerstick glucose reviewed-   Recent Labs   Lab 04/02/24  1151 04/02/24  1647 04/02/24  2109 04/03/24  0211   POCTGLUCOSE 143* 83 109 99       Current correctional scale   LD SSI  Maintain anti-hyperglycemic dose as follows-   Antihyperglycemics (From admission, onward)    Start     Stop Route Frequency Ordered    04/03/24 0900  insulin detemir U-100 (Levemir) pen 15 Units         -- SubQ 2 times daily 04/03/24 0622    03/31/24 1130  insulin aspart U-100 pen 6-8 Units         -- SubQ 3 times daily with meals 03/31/24 0845    03/29/24 2346  insulin aspart U-100 pen 0-10 Units         -- SubQ Before meals, nightly and at 0200 PRN 03/29/24 2247        Hold Oral hypoglycemics while patient is in the hospital.  Will keep patient on DKA pathway with insulin drip and fluid with protocol in place for switching to subcutaneous insulin as anion gap closes.      DKA    - Resolved  - Continue insulin regimen and BG checks TIDWM and QHS and 2am  - patient tends to become hypoglycemic around dinnertime  - could be insulin stacking from breakfast and lunch insulin aspart given poor PO intake despite assistance with feeding   - detemir 14 unit BID  - aspart scheduled w/ only breakfast and lunch and dinner with different units  -Inpatient consult to Endocrine

## 2024-04-03 NOTE — PLAN OF CARE
NURSING HOME ORDERS    04/03/2024  [facility]  [facility] - TELEMETRY STEPDOWN  [address]  [facility] [address]  Dept: [phone]  Loc: [phone]     Admit to Nursing Home:  [facility]*    Diagnoses:  Active Hospital Problems    Diagnosis  POA    *Osteomyelitis of left lower extremity [M86.9]  Yes    Pacemaker [Z95.0]  Yes     Chronic    Fever [R50.9]  No    Edema due to hypervolemia [E87.70]  No    Urinary retention [R33.9]  No    BPH (benign prostatic hyperplasia) [N40.0]  Yes    Localized swelling of right upper extremity [R22.31]  Yes    Proteus infection [A49.8]  Yes     Specimen Information: Leg, Left; Wound   0 Result Notes      Component 3 d ago from 3/9/24   Aerobic Bacterial Culture  Abnormal   PROTEUS MIRABILIS  Few    Aerobic Bacterial Culture  Abnormal   ENTEROBACTER CLOACAE  Few  Skin bossman also present    Resulting Agency [facility]        Susceptibility       Proteus mirabilis Enterobacter cloacae     CULTURE, AEROBIC  (SPECIFY SOURCE) CULTURE, AEROBIC  (SPECIFY SOURCE)     Amox/K Clav'ate <=8/4 mcg/mL Sensitive       Amp/Sulbactam <=8/4 mcg/mL Sensitive       Ampicillin <=8 mcg/mL Sensitive       Cefazolin <=2 mcg/mL Sensitive       Cefepime <=2 mcg/mL Sensitive >16 mcg/mL Resistant     Ceftriaxone <=1 mcg/mL Sensitive >32 mcg/mL Resistant     Ciprofloxacin <=1 mcg/mL Sensitive <=1 mcg/mL Sensitive     Ertapenem <=0.5 mcg/mL Sensitive <=0.5 mcg/mL Sensitive     Gentamicin <=4 mcg/mL Sensitive <=4 mcg/mL Sensitive     Levofloxacin <=2 mcg/mL Sensitive <=2 mcg/mL Sensitive     Meropenem <=1 mcg/mL Sensitive <=1 mcg/mL Sensitive     Piperacillin/Tazo <=16 mcg/mL Sensitive <=16 mcg/mL Sensitive     Tetracycline   <=4 mcg/mL Sensitive     Tobramycin <=4 mcg/mL Sensitive <=4 mcg/mL Sensitive     Trimeth/Sulfa <=2/38 mcg/mL Sensitive <=2/38 mcg/mL Sensitive                   History of Bedbug bite with infection [T14.8XXA, L08.9]  Yes     Rx Permethrin  daily X 3 days (topical 3/8/24)/      History of amputation of left high mid foot  [Z89.432]  Not Applicable    Chronic anticoagulation [Z79.01]  Not Applicable    Diabetic ketoacidosis without coma associated with type 2 diabetes mellitus [E11.10]  Yes    Other hyperlipidemia [E78.49]  Yes    Severe sepsis [A41.9, R65.20]  Yes    Stage 3b chronic kidney disease [N18.32]  Yes    AVB (atrioventricular block) [I44.30]  Yes    Paroxysmal atrial fibrillation [I48.0]  Yes     Chronic    Chronic deep vein thrombosis (DVT) of right upper extremity [I82.721]  Yes     Chronic    Iron deficiency anemia [D50.9]  Yes    Cellulitis of left lower leg [L03.116]  Yes    Peripheral arterial disease [I73.9]  Yes     - long standing wounds with skin graft to left TMA in 2049-2385; LE angio in 2014 with patent left CFA, SFA, PFA with 3V run off on LE angiogram by Vascular surgery  - CTA LE with run off 6/2022 with moderate to high grade disease of right popliteal with severely diseased AT, PT, peroneal and multiple occlusion of left popliteal with occluded PT and peroneal and short segment occlusion of AT- patient denies any previous intervention  - BLE arterial ultrasound 3/2023 with similar findings suggestive of bilateral popliteal and BTK disease; seen by Vascular surgery at [facility] with recommendation for amputation- patient declined  - transferred to [facility] 05/2023 for evaluation for revascularization; extensive disease and multiple co morbidities along with debility/bed bound status and concern for non compliance, deemed not a  Revascularization candidate and placed on statin and Plavix; no ASA given risk of bleeding with triple therapy        YNES (acute kidney injury) [N17.9]  Yes    Insulin dependent type 2 diabetes mellitus [E11.9, Z79.4]  Not Applicable     Chronic    Essential hypertension [I10]  Yes     Chronic      Resolved Hospital Problems    Diagnosis Date Resolved POA    HAP (hospital-acquired pneumonia) [J18.9,  Y95] 04/01/2024 No    Elevated troponin [R79.89] 03/07/2024 Yes       Patient is homebound due to:  Osteomyelitis of left lower extremity    Allergies:Review of patient's allergies indicates:  No Known Allergies    Vitals:  Routine    Diet: diabetic diet: 2000 calorie    Activities:   Activity as tolerated    Goals of Care Treatment Preferences:  Code Status: Full Code    Health care agent: January Nicholson [phone]  Health care agent number: [phone]          What is most important right now is to focus on avoiding the hospital, remaining as independent as possible.  Accordingly, we have decided that the best plan to meet the patient's goals includes continuing with treatment: Full code      Labs:  CBC + CMP Q48h    Nursing Precautions:  Fall and Pressure ulcer prevention    Consults:   PT to evaluate and treat- 4 times a week, OT to evaluate and treat- 4 times a week, Wound Care, and Nutrition to evaluate and recommend diet     Miscellaneous Care:                  Diabetes Care:  SN to perform and educate Diabetic management with blood glucose monitoring:, Fingerstick blood sugar AC and HS, and Report CBG < 60 or > 350 to physician.      Medications: Discontinue all previous medication orders, if any. See new list below.     Medication List        START taking these medications      LEVEMIR FLEXPEN 100 unit/mL (3 mL) Inpn pen  Generic drug: insulin detemir U-100 (Levemir)  Inject 12 Units into the skin 2 (two) times daily.     levoFLOXacin 750 MG tablet  Commonly known as: LEVAQUIN  Take 1 tablet (750 mg total) by mouth once daily. for 1 day            CHANGE how you take these medications      blood sugar diagnostic Strp  Commonly known as: CONTOUR TEST STRIPS  1 strip by Misc.(Non-Drug; Combo Route) route 4 (four) times daily. Use to check blood glucose 4x daily  What changed:   how to take this  when to take this  additional instructions     furosemide 20 MG tablet  Commonly known as: LASIX  Take 1  "tablet (20 mg total) by mouth once daily.  What changed:   medication strength  when to take this     insulin aspart U-100 100 unit/mL (3 mL) Inpn pen  Commonly known as: NovoLOG  Inject 5 Units into the skin 3 (three) times daily with meals.  What changed: how much to take     isosorbide dinitrate 10 MG tablet  Commonly known as: ISORDIL  Take 1 tablet (10 mg total) by mouth 3 (three) times daily. Hold until follow up with a provider  What changed: additional instructions     lancets Misc  Commonly known as: MICROLET LANCET  1 each by Misc.(Non-Drug; Combo Route) route 4 (four) times daily. Use to check blood sugars 4x daily  What changed:   how much to take  how to take this  when to take this  additional instructions     NIFEdipine 90 MG (OSM) 24 hr tablet  Commonly known as: PROCARDIA-XL  Take 1 tablet (90 mg total) by mouth once daily.  What changed:   medication strength  how much to take            CONTINUE taking these medications      acetaminophen 325 MG tablet  Commonly known as: TYLENOL  Take 650 mg by mouth every 6 (six) hours as needed for Temperature greater than.     acidophilus-pectin, citrus 100 million cell-10 mg Cap  Take 1 capsule by mouth 2 (two) times a day.     apixaban 5 mg Tab  Commonly known as: ELIQUIS  Take 1 tablet (5 mg total) by mouth 2 (two) times daily.     ascorbic acid (vitamin C) 500 MG tablet  Commonly known as: VITAMIN C  Take 500 mg by mouth 2 (two) times daily.     B COMPLEX-VITAMIN B12 tablet  Generic drug: b complex vitamins  Take 1 tablet by mouth once daily.     BD AUTOSHIELD DUO PEN NEEDLE 30 gauge x 3/16" Ndle  Generic drug: pen needle,diabetic dual safty     * BD ULTRA-FINE ADITYA PEN NEEDLE 32 gauge x 5/32" Ndle  Generic drug: pen needle, diabetic  USE FOUR TIMES DAILY WITH MEALS AND NIGHTLY     * BD ULTRA-FINE ADITYA PEN NEEDLE 32 gauge x 5/32" Ndle  Generic drug: pen needle, diabetic  use as directed with insulin     clopidogreL 75 mg tablet  Commonly known as: " PLAVIX  Take 1 tablet (75 mg total) by mouth once daily.     FLOMAX 0.4 mg Cap  Generic drug: tamsulosin  Take 0.4 mg by mouth once daily.     gabapentin 300 MG capsule  Commonly known as: NEURONTIN  Take 300 mg by mouth 2 (two) times daily.     multivitamin per tablet  Commonly known as: THERAGRAN  Take 1 tablet by mouth once daily.     pantoprazole 40 MG tablet  Commonly known as: PROTONIX  Take 40 mg by mouth once daily. Before breakfast     rosuvastatin 40 MG Tab  Commonly known as: CRESTOR  Take 40 mg by mouth once daily.           * This list has 2 medication(s) that are the same as other medications prescribed for you. Read the directions carefully, and ask your doctor or other care provider to review them with you.                STOP taking these medications      DAKIN'S SOLUTION external solution  Generic drug: sodium hypochlorite 0.5 %     diphenhydrAMINE 25 mg capsule  Commonly known as: BENADRYL     glipiZIDE 10 MG tablet  Commonly known as: GLUCOTROL     hydrALAZINE 100 MG tablet  Commonly known as: APRESOLINE     LANTUS SOLOSTAR U-100 INSULIN glargine 100 units/mL SubQ pen  Generic drug: insulin     miconazole NITRATE 2 % 2 % top powder  Commonly known as: MICOTIN     oxyCODONE 5 mg Tbor     triamcinolone acetonide 0.025% 0.025 % Oint  Commonly known as: KENALOG                Immunizations Administered as of 4/3/2024       Name Date Dose VIS Date Route Exp Date    COVID-19, MRNA, LN-S, PF (Moderna) 7/13/2021 0.5 mL -- Intramuscular --    Site: Left arm     : [employer]     Lot: [id number]     External: Auto Reconciled From Outside Source     Comment: Adminis     COVID-19, MRNA, LN-S, PF (Moderna) 5/24/2021 0.5 mL -- -- --    : Modern[employer]     Lot: [id number]     External: Auto Reconciled From Outside Source     Comment: Adminis             This patient has had both covid vaccinations    Some patients may experience side effects after vaccination.  These may include fever,  headache, muscle or joint aches.  Most symptoms resolve with 24-48 hours and do not require urgent medical evaluation unless they persist for more than 72 hours or symptoms are concerning for an unrelated medical condition.          _________________________________  Jori Byrd MD  04/03/2024

## 2024-04-03 NOTE — ASSESSMENT & PLAN NOTE
Patient with Persistent (7 days or more) atrial fibrillation which is uncontrolled currently with    . Patient is currently in sinus rhythm.[id number] Score: 4. . Anticoagulation indicated. Anticoagulation done with heparin/eliquis .    - ac with eliquis 5 BID resumed 3/30

## 2024-04-03 NOTE — PLAN OF CARE
Problem: Adult Inpatient Plan of Care  Goal: Plan of Care Review  Outcome: Ongoing, Progressing     Problem: Diabetes Comorbidity  Goal: Blood Glucose Level Within Targeted Range  Outcome: Ongoing, Progressing     Problem: Adjustment to Illness (Sepsis/Septic Shock)  Goal: Optimal Coping  Outcome: Ongoing, Progressing     Problem: Infection  Goal: Absence of Infection Signs and Symptoms  Outcome: Ongoing, Progressing     Problem: Impaired Wound Healing  Goal: Optimal Wound Healing  Outcome: Ongoing, Progressing     Problem: Skin Injury Risk Increased  Goal: Skin Health and Integrity  Outcome: Ongoing, Progressing     Problem: Fall Injury Risk  Goal: Absence of Fall and Fall-Related Injury  Outcome: Ongoing, Progressing     Problem: Fluid and Electrolyte Imbalance (Acute Kidney Injury/Impairment)  Goal: Fluid and Electrolyte Balance  Outcome: Ongoing, Progressing    Pt. A&ox3. Afebrile; VSS. Complained of generalized pain; PRN medications provided w/ relief. Frequent reposition encouraged/ provided. Sams catheter intact; Strict I&O's maintained. Incontinence care provided. Wound care provided. Call light within reach. Fall/ safety precautions maintained.

## 2024-04-03 NOTE — PLAN OF CARE
Spoke with Bryce at [facility] [phone], she is contacting family and applying for auth. Updates sent.     QUINCY will follow.    9:37 AM  SW contacted Pt's sister January, she is available to sign admission paperwork, QUINCY then contacted Bryce at [facility], asking her to call January.     VANESSA Lewis, LMSW [facility]  [id number]

## 2024-04-03 NOTE — ASSESSMENT & PLAN NOTE
Resolved    Patient with acute kidney injury/acute renal failure likely due to pre-renal azotemia due to dehydration and post-obstructive d/t urinary retention  YNES is currently stable. Baseline creatinine  1.1  - Labs reviewed- Renal function/electrolytes with Estimated Creatinine Clearance: 90.6 mL/min (based on SCr of 0.9 mg/dL). according to latest data. Monitor urine output and serial BMP and adjust therapy as needed. Avoid nephrotoxins and renally dose meds for GFR listed above.    Creatinine 2.1 on admit, baseline around 1.1  - prerenal vs obstructive    Lab Results   Component Value Date    CREATININE 0.9 04/03/2024       Plan:   4/2 failed voiding trial w/ pt retaining >1.5L urine on scan, fung replaced  - Strict I&Os and daily weights   - Daily BMP  - Trend sCr  - Avoid nephrotoxic agents such as NSAIDs, ACEi, ARBs and IV radiocontrast.  - Renally dose meds to current GFR.  - Maintain MAP > 65.  - No indications for HD at this time.   - Maintain electrolytes at goal: Mg > 2, Phos > 3, K > 4.

## 2024-04-03 NOTE — ASSESSMENT & PLAN NOTE
Pt with leadless [employer] pacemaker in place, per device rep set to operate if his HR reaches 40

## 2024-04-03 NOTE — ASSESSMENT & PLAN NOTE
BG goal: 140-180    - Levemir (Insulin Detemir) 15 units BID (20% reduction due to fasting blood glucose below goal.)  - Novolog (Insulin Aspart) 6-8 units with meals (Administer    6    units if patient eats 25-50% of meal, administer    8    units if patient eats > 50% of meal.) and prn for BG excursions [facility] SSI (150/25)  - BG checks /HS/0200   - Hypoglycemia protocol in place     ** Please notify Endocrine for any change and/or advance in diet**  ** Please call Endocrine for any BG related issues **     Discharge Planning:   TBD. Please notify endocrinology prior to discharge.

## 2024-04-03 NOTE — ASSESSMENT & PLAN NOTE
Resolved    - eliquis transitioned to Heparin which was paused temporarily d/t concern of new hematuria which developed 3/25. Discussed with urology, the hematuria resolved with repositioning of the fung. Heparin resumed, but will temporarily be paused as patient needs preoperative blood transfusion and has minimal IV access d/t placement difficulties & limb alert on R-arm d/t DVT    - resumed eliquis 3/30     - 3/31 new LUE swelling c/f DVT, US bilateral negative for new DVT - continue eliquis 5 BID

## 2024-04-03 NOTE — ASSESSMENT & PLAN NOTE
Patient's anemia is currently uncontrolled. Has received 2 units of PRBCs on 3/23 and 3/16 . Etiology likely d/t chronic blood loss and Iron deficiency. Receiving 1u on 3/26 preoperatively    Current CBC reviewed-   Lab Results   Component Value Date    HGB 8.4 (L) 04/03/2024    HCT 27.6 (L) 04/03/2024     Monitor serial CBC and transfuse if patient becomes hemodynamically unstable, symptomatic or H/H drops below 7/21.

## 2024-04-03 NOTE — ASSESSMENT & PLAN NOTE
Pt with developing extremity edema, partially d/t known RUE DVT which doesn't explain the LUE/RLE edema. Will trial diuresis, obtain echo, and DVT US for new LUE swelling which was negative.     - now on RA  - resolving with diuresis

## 2024-04-04 LAB
POCT GLUCOSE: 100 MG/DL (ref 70–110)
POCT GLUCOSE: 128 MG/DL (ref 70–110)
POCT GLUCOSE: 150 MG/DL (ref 70–110)
POCT GLUCOSE: 153 MG/DL (ref 70–110)
POCT GLUCOSE: 98 MG/DL (ref 70–110)

## 2024-04-04 PROCEDURE — 25000003 PHARM REV CODE 250

## 2024-04-04 PROCEDURE — 20600001 HC STEP DOWN PRIVATE ROOM

## 2024-04-04 PROCEDURE — A4216 STERILE WATER/SALINE, 10 ML: HCPCS

## 2024-04-04 PROCEDURE — 99232 SBSQ HOSP IP/OBS MODERATE 35: CPT | Mod: ,,, | Performed by: NURSE PRACTITIONER

## 2024-04-04 PROCEDURE — 63600175 PHARM REV CODE 636 W HCPCS

## 2024-04-04 PROCEDURE — 27000207 HC ISOLATION

## 2024-04-04 RX ADMIN — CLOPIDOGREL BISULFATE 75 MG: 75 TABLET ORAL at 09:04

## 2024-04-04 RX ADMIN — TRAZODONE HYDROCHLORIDE 25 MG: 50 TABLET ORAL at 08:04

## 2024-04-04 RX ADMIN — GABAPENTIN 300 MG: 300 CAPSULE ORAL at 08:04

## 2024-04-04 RX ADMIN — APIXABAN 5 MG: 5 TABLET, FILM COATED ORAL at 08:04

## 2024-04-04 RX ADMIN — PANTOPRAZOLE SODIUM 40 MG: 40 TABLET, DELAYED RELEASE ORAL at 09:04

## 2024-04-04 RX ADMIN — ACETAMINOPHEN 650 MG: 325 TABLET ORAL at 09:04

## 2024-04-04 RX ADMIN — Medication 10 ML: at 12:04

## 2024-04-04 RX ADMIN — NIFEDIPINE 90 MG: 30 TABLET, FILM COATED, EXTENDED RELEASE ORAL at 09:04

## 2024-04-04 RX ADMIN — FUROSEMIDE 40 MG: 10 INJECTION, SOLUTION INTRAVENOUS at 09:04

## 2024-04-04 RX ADMIN — APIXABAN 5 MG: 5 TABLET, FILM COATED ORAL at 09:04

## 2024-04-04 RX ADMIN — Medication 10 ML: at 06:04

## 2024-04-04 RX ADMIN — TAMSULOSIN HYDROCHLORIDE 0.4 MG: 0.4 CAPSULE ORAL at 09:04

## 2024-04-04 RX ADMIN — GABAPENTIN 300 MG: 300 CAPSULE ORAL at 09:04

## 2024-04-04 RX ADMIN — INSULIN ASPART 7 UNITS: 100 INJECTION, SOLUTION INTRAVENOUS; SUBCUTANEOUS at 11:04

## 2024-04-04 RX ADMIN — MUPIROCIN: 20 OINTMENT TOPICAL at 09:04

## 2024-04-04 RX ADMIN — COLLAGENASE SANTYL: 250 OINTMENT TOPICAL at 09:04

## 2024-04-04 RX ADMIN — INSULIN ASPART 6 UNITS: 100 INJECTION, SOLUTION INTRAVENOUS; SUBCUTANEOUS at 04:04

## 2024-04-04 RX ADMIN — INSULIN ASPART 8 UNITS: 100 INJECTION, SOLUTION INTRAVENOUS; SUBCUTANEOUS at 08:04

## 2024-04-04 RX ADMIN — ATORVASTATIN CALCIUM 40 MG: 40 TABLET, FILM COATED ORAL at 09:04

## 2024-04-04 RX ADMIN — MUPIROCIN: 20 OINTMENT TOPICAL at 08:04

## 2024-04-04 RX ADMIN — LEVOFLOXACIN 750 MG: 750 TABLET, FILM COATED ORAL at 09:04

## 2024-04-04 RX ADMIN — INSULIN ASPART 2 UNITS: 100 INJECTION, SOLUTION INTRAVENOUS; SUBCUTANEOUS at 04:04

## 2024-04-04 NOTE — PROGRESS NOTES
[facility] - Telemetry Stepdown  Endocrinology  Progress Note    Admit Date: 3/5/2024     Reason for Consult: Management of T2DM, Hyperglycemia     Diabetes diagnosis year: > 25 years    Home Diabetes Medications:  Novolog 6 units w/ meals and Lantus 45 units nightly (states he is not taking).     How often checking glucose at home?  Not really checking    BG readings on regimen: 200's  Hypoglycemia on the regimen?  Yes (When he was taking the Lantus)  Missed doses on regimen?  Yes    Diabetes Complications include:     Hyperglycemia, Hypoglycemia , Diabetic chronic kidney disease     , Diabetic dermatitis, Foot ulcer  , and Periodontal disease    Complicating diabetes co morbidities:   HTN; HLD      HPI: Saji Castañeda is a 75 yo M with a history of Afib, T2D, HLD, HTN, chronic osteomyelitis of L heel, s/p L TMA, PID, ESBL Klebsiella and Acinetobacter bacteremia who was admitted after being found down at home and DKA. Endocrine consulted to manage hyperglycemia and type 2 diabetes.   Hemoglobin A1C   Date Value Ref Range Status   03/05/2024 >14.0 (H) 4.0 - 5.6 % Final     Comment:     ADA Screening Guidelines:  5.7-6.4%  Consistent with prediabetes  >or=6.5%  Consistent with diabetes    High levels of fetal hemoglobin interfere with the HbA1C  assay. Heterozygous hemoglobin variants (HbS, HgC, etc)do  not significantly interfere with this assay.   However, presence of multiple variants may affect accuracy.     09/13/2023 11.0 (H) 4.5 - 5.7 % Final   03/10/2023 8.4 (H) 4.0 - 5.6 % Final     Comment:     ADA Screening Guidelines:  5.7-6.4%  Consistent with prediabetes  >or=6.5%  Consistent with diabetes    High levels of fetal hemoglobin interfere with the HbA1C  assay. Heterozygous hemoglobin variants (HbS, HgC, etc)do  not significantly interfere with this assay.   However, presence of multiple variants may affect accuracy.     09/17/2022 9.9 (H) 4.0 - 5.6 % Final     Comment:     ADA Screening Guidelines:  5.7-6.4%   "Consistent with prediabetes  >or=6.5%  Consistent with diabetes    High levels of fetal hemoglobin interfere with the HbA1C  assay. Heterozygous hemoglobin variants (HbS, HgC, etc)do  not significantly interfere with this assay.   However, presence of multiple variants may affect accuracy.              Interval HPI:   Overnight events: No acute events overnight. Patient in room 8078/8078 A. Blood glucose stable. BG at and below goal on current insulin regimen (SSI, prandial, and basal insulin ). Steroid use- None. 8 Days Post-Op  Renal function- Normal   Vasopressors-  None       Endocrine will continue to follow and manage insulin orders inpatient.         Diet diabetic  Calorie     Eatin%  Nausea: No  Hypoglycemia and intervention: No  Fever: No  TPN and/or TF: No    BP (!) 123/53 (BP Location: Right arm, Patient Position: Lying)   Pulse (!) 44   Temp 98 °F (36.7 °C) (Oral)   Resp 15   Ht 5' 9" (1.753 m)   Wt 119.7 kg (264 lb)   SpO2 96%   BMI 38.99 kg/m²     Labs Reviewed and Include    No results for input(s): "GLU", "CALCIUM", "ALBUMIN", "PROT", "NA", "K", "CO2", "CL", "BUN", "CREATININE", "ALKPHOS", "ALT", "AST", "BILITOT" in the last 24 hours.    Lab Results   Component Value Date    WBC 8.90 2024    HGB 8.4 (L) 2024    HCT 27.6 (L) 2024    MCV 83 2024     (H) 2024     No results for input(s): "TSH", "FREET4" in the last 168 hours.  Lab Results   Component Value Date    HGBA1C >14.0 (H) 2024       Nutritional status:   Body mass index is 38.99 kg/m².  Lab Results   Component Value Date    ALBUMIN 1.2 (L) 2024    ALBUMIN 1.4 (L) 2024    ALBUMIN 1.4 (L) 2024     Lab Results   Component Value Date    PREALBUMIN 17 (L) 2014    PREALBUMIN 16 (L) 2014    PREALBUMIN 19 (L) 2014       Estimated Creatinine Clearance: 90.6 mL/min (based on SCr of 0.9 mg/dL).    Accu-Checks  Recent Labs     24  0825 24  1151 " 04/02/24  1647 04/02/24  2109 04/03/24  0211 04/03/24  0818 04/03/24  0928 04/03/24  1140 04/03/24  1546 04/03/24 2002   POCTGLUCOSE 170* 143* 83 109 99 81 90 121* 107 120*       Current Medications and/or Treatments Impacting Glycemic Control  Immunotherapy:    Immunosuppressants       None          Steroids:   Hormones (From admission, onward)      Start     Stop Route Frequency Ordered    03/28/24 1128  melatonin tablet 6 mg         -- Oral Nightly PRN 03/28/24 1028          Pressors:    Autonomic Drugs (From admission, onward)      None          Hyperglycemia/Diabetes Medications:   Antihyperglycemics (From admission, onward)      Start     Stop Route Frequency Ordered    04/04/24 0900  insulin detemir U-100 (Levemir) pen 12 Units         -- SubQ 2 times daily 04/04/24 0604    03/31/24 1130  insulin aspart U-100 pen 6-8 Units         -- SubQ 3 times daily with meals 03/31/24 0845    03/29/24 2346  insulin aspart U-100 pen 0-10 Units         -- SubQ Before meals, nightly and at 0200 PRN 03/29/24 2247            ASSESSMENT and PLAN    ID  * Osteomyelitis of left lower extremity  Managed per primary team  Optimize BG control to improve wound healing        Endocrine  Diabetic ketoacidosis without coma associated with type 2 diabetes mellitus  Resolved.         Insulin dependent type 2 diabetes mellitus  BG goal: 140-180    - Levemir (Insulin Detemir) 12 units BID (20% reduction due to fasting blood glucose below goal.)  - Novolog (Insulin Aspart) 6-8 units with meals (Administer    6    units if patient eats 25-50% of meal, administer    8    units if patient eats > 50% of meal.) and prn for BG excursions [facility] SSI (150/25)  - BG checks AC/HS/0200   - Hypoglycemia protocol in place     ** Please notify Endocrine for any change and/or advance in diet**  ** Please call Endocrine for any BG related issues **     Discharge Planning:   TBD. Please notify endocrinology prior to discharge.         Chaka Dominguez, OSCAR,  SKYLARP  Endocrinology  [facility] - Telemetry Stepdown

## 2024-04-04 NOTE — PLAN OF CARE
Patient AAOx3, reorient to time. Denies pain/discomfort, wound dressing intact, no foul odor or purulent discharge noted. No apparent distress noted. Plan of care in progress.     Problem: Diabetes Comorbidity  Goal: Blood Glucose Level Within Targeted Range  Outcome: Ongoing, Progressing     Problem: Adjustment to Illness (Sepsis/Septic Shock)  Goal: Optimal Coping  Outcome: Ongoing, Progressing     Problem: Bleeding (Sepsis/Septic Shock)  Goal: Absence of Bleeding  Outcome: Ongoing, Progressing     Problem: Infection Progression (Sepsis/Septic Shock)  Goal: Absence of Infection Signs and Symptoms  Outcome: Ongoing, Progressing     Problem: Nutrition Impaired (Sepsis/Septic Shock)  Goal: Optimal Nutrition Intake  Outcome: Ongoing, Progressing     Problem: Impaired Wound Healing  Goal: Optimal Wound Healing  Outcome: Ongoing, Progressing     Problem: Skin Injury Risk Increased  Goal: Skin Health and Integrity  Outcome: Ongoing, Progressing     Problem: Fall Injury Risk  Goal: Absence of Fall and Fall-Related Injury  Outcome: Ongoing, Progressing

## 2024-04-04 NOTE — SUBJECTIVE & OBJECTIVE
Interval History: NAEON. Pt comfortable, no complaints pending d/c      Objective:     Vital Signs (Most Recent):  Temp: 98.6 °F (37 °C) (04/04/24 0759)  Pulse: (!) 44 (04/04/24 0759)  Resp: 18 (04/04/24 0759)  BP: 135/63 (04/04/24 0759)  SpO2: 100 % (04/04/24 0759) Vital Signs (24h Range):  Temp:  [98 °F (36.7 °C)-99.3 °F (37.4 °C)] 98.6 °F (37 °C)  Pulse:  [44-82] 44  Resp:  [15-19] 18  SpO2:  [96 %-100 %] 100 %  BP: (123-151)/(53-67) 135/63     Weight: 119.7 kg (264 lb)  Body mass index is 38.99 kg/m².    Intake/Output Summary (Last 24 hours) at 4/4/2024 0846  Last data filed at 4/3/2024 1800  Gross per 24 hour   Intake 250 ml   Output 800 ml   Net -550 ml         Physical Exam  Constitutional:       General: He is not in acute distress.     Appearance: Normal appearance. He is well-developed. Ill appearance: chronically ill appearing.   HENT:      Head: Normocephalic and atraumatic.      Right Ear: External ear normal.      Left Ear: External ear normal.      Nose: Nose normal.   Eyes:      General:         Right eye: No discharge.         Left eye: No discharge.      Conjunctiva/sclera: Conjunctivae normal.   Cardiovascular:      Rate and Rhythm: Normal rate and regular rhythm.      Pulses: Normal pulses.   Pulmonary:      Effort: Pulmonary effort is normal. No respiratory distress.      Breath sounds: No stridor.   Abdominal:      General: Abdomen is flat.      Palpations: Abdomen is soft.      Tenderness: There is no abdominal tenderness.   Musculoskeletal:         General: Swelling and deformity present.      Cervical back: Normal range of motion.      Comments: S/p L-AKA, bandage CDI  RUE swelling   Skin:     General: Skin is warm and dry.   Neurological:      General: No focal deficit present.      Mental Status: He is alert and oriented to person, place, and time.             Significant Labs: All pertinent labs within the past 24 hours have been reviewed.    Significant Imaging: I have reviewed all  pertinent imaging results/findings within the past 24 hours.

## 2024-04-04 NOTE — PROGRESS NOTES
"[facility] - Telemetry Stepdown  Adult Nutrition  Progress Note    SUMMARY       Recommendations    Continue Diabetic diet + ONS.  RD following.    Goals: Meet % EEN, EPN by RD f/u date  Nutrition Goal Status: goal met  Communication of RD Recs: reviewed with RN    Reason for Assessment    Reason For Assessment: RD follow-up  Diagnosis: other (see comments) (Osteomyelitis)  Relevant Medical History: DM  Interdisciplinary Rounds: did not attend    General Information Comments: Pt scheduled to discharge today - tolerating diet + ONS w/ 75% PO intake (per RN documentation). SONAL SANCHEZ 3/27.  Nutrition Discharge Planning: Adequate PO intake    Nutrition/Diet History    Patient Reported Diet/Restrictions/Preferences: general  Spiritual, Cultural Beliefs, Nondenominational Practices, Values that Affect Care: no  Factors Affecting Nutritional Intake: None identified at this time    Anthropometrics    Temp: 99 °F (37.2 °C)  Height Method: Stated  Height: 5' 9" (175.3 cm)  Height (inches): 69 in  Weight Method: Stated  Weight: 119.7 kg (263 lb 14.3 oz)  Weight (lb): 263.89 lb  Ideal Body Weight (IBW), Male: 160 lb  % Ideal Body Weight, Male (lb): 164.93 %  BMI (Calculated): 39  BMI Grade: 35 - 39.9 - obesity - grade II  Amputation %: 16  Total Amputation %: 16  Amputation Ideal Body Weight (IBW), Male (lb): 144 lb  Amputee BMI (kg/m2): 46.52 kg/m2    Lab/Procedures/Meds    Pertinent Labs Reviewed: reviewed  Pertinent Labs Comments: A1C >14  Pertinent Medications Reviewed: reviewed  Pertinent Medications Comments: Statin    Estimated/Assessed Needs    Weight Used For Calorie Calculations: 119.7 kg (263 lb 14.3 oz)    Energy Calorie Requirements (kcal): 1922 kcal/d  Energy Need Method: Sumner-St Jeor (1.0 PAL)    Protein Requirements: 108 g/d (.9 g/kg)  Weight Used For Protein Calculations: 119.7 kg (263 lb 14.3 oz)    Estimated Fluid Requirement Method: other (see comments) (Per MD)  RDA Method (mL): 1922    Nutrition Prescription " Ordered    Current Diet Order: 2000 kcal ADA  Oral Nutrition Supplement: Boost Glucose Control    Evaluation of Received Nutrient/Fluid Intake    I/O: -    Comments: LBM: 4/3    Tolerance: tolerating    Nutrition Risk    Level of Risk/Frequency of Follow-up:  (1x/week)     Monitor and Evaluation    Food and Nutrient Intake: food and beverage intake, energy intake  Food and Nutrient Adminstration: diet order  Physical Activity and Function: nutrition-related ADLs and IADLs  Anthropometric Measurements: weight, weight change  Biochemical Data, Medical Tests and Procedures: inflammatory profile, lipid profile, glucose/endocrine profile, gastrointestinal profile, electrolyte and renal panel  Nutrition-Focused Physical Findings: overall appearance     Nutrition Follow-Up    RD Follow-up?: Yes

## 2024-04-04 NOTE — ASSESSMENT & PLAN NOTE
Patient with Persistent (7 days or more) atrial fibrillation which is uncontrolled currently with    . Patient is currently in sinus rhythm.[id number] Score: 4. . Anticoagulation indicated. Anticoagulation done with heparin/eliquis .    - ac with eliquis 5 BID resumed 3/30

## 2024-04-04 NOTE — ASSESSMENT & PLAN NOTE
Chronic, uncontrolled. Latest blood pressure and vitals reviewed-     Temp:  [98 °F (36.7 °C)-99.3 °F (37.4 °C)]   Pulse:  [44-82]   Resp:  [15-19]   BP: (123-151)/(53-67)   SpO2:  [96 %-100 %] .   Home meds for hypertension were reviewed and noted below.   Hypertension Medications               furosemide (LASIX) 40 MG tablet Take 20 mg by mouth.    hydrALAZINE (APRESOLINE) 100 MG tablet Take 1 tablet (100 mg total) by mouth every 8 (eight) hours.    isosorbide dinitrate (ISORDIL) 10 MG tablet Take 1 tablet (10 mg total) by mouth 3 (three) times daily.    NIFEdipine (PROCARDIA-XL) 60 MG (OSM) 24 hr tablet Take 1 tablet (60 mg total) by mouth once daily.            While in the hospital, will manage blood pressure as follows; Adjust home antihypertensive regimen as follows- will keep nifedipine - increased to 90     Will utilize p.r.n. blood pressure medication only if patient's blood pressure greater than 180/110 and he develops symptoms such as worsening chest pain or shortness of breath.

## 2024-04-04 NOTE — NURSING
Nurses Note -- 4 Eyes      4/4/2024   0708 AM      Skin assessed during: Q Shift Change      [] No Altered Skin Integrity Present    []Prevention Measures Documented      [x] Yes- Altered Skin Integrity Present or Discovered   [] LDA Added if Not in Epic (Describe Wound)   [] New Altered Skin Integrity was Present on Admit and Documented in LDA   [] Wound Image Taken    Wound Care Consulted? No    Attending Nurse:  Yue Rehman RN/Staff Member: Mariaelena

## 2024-04-04 NOTE — SUBJECTIVE & OBJECTIVE
"Interval HPI:   Overnight events: No acute events overnight. Patient in room 8078/8078 A. Blood glucose stable. BG at and below goal on current insulin regimen (SSI, prandial, and basal insulin ). Steroid use- None. 8 Days Post-Op  Renal function- Normal   Vasopressors-  None       Endocrine will continue to follow and manage insulin orders inpatient.         Diet diabetic  Calorie     Eatin%  Nausea: No  Hypoglycemia and intervention: No  Fever: No  TPN and/or TF: No    BP (!) 123/53 (BP Location: Right arm, Patient Position: Lying)   Pulse (!) 44   Temp 98 °F (36.7 °C) (Oral)   Resp 15   Ht 5' 9" (1.753 m)   Wt 119.7 kg (264 lb)   SpO2 96%   BMI 38.99 kg/m²     Labs Reviewed and Include    No results for input(s): "GLU", "CALCIUM", "ALBUMIN", "PROT", "NA", "K", "CO2", "CL", "BUN", "CREATININE", "ALKPHOS", "ALT", "AST", "BILITOT" in the last 24 hours.    Lab Results   Component Value Date    WBC 8.90 2024    HGB 8.4 (L) 2024    HCT 27.6 (L) 2024    MCV 83 2024     (H) 2024     No results for input(s): "TSH", "FREET4" in the last 168 hours.  Lab Results   Component Value Date    HGBA1C >14.0 (H) 2024       Nutritional status:   Body mass index is 38.99 kg/m².  Lab Results   Component Value Date    ALBUMIN 1.2 (L) 2024    ALBUMIN 1.4 (L) 2024    ALBUMIN 1.4 (L) 2024     Lab Results   Component Value Date    PREALBUMIN 17 (L) 2014    PREALBUMIN 16 (L) 2014    PREALBUMIN 19 (L) 2014       Estimated Creatinine Clearance: 90.6 mL/min (based on SCr of 0.9 mg/dL).    Accu-Checks  Recent Labs     24  0825 24  1151 24  1647 24  2109 24  0211 24  0818 24  0928 24  1140 24  1546 24  2002   POCTGLUCOSE 170* 143* 83 109 99 81 90 121* 107 120*       Current Medications and/or Treatments Impacting Glycemic Control  Immunotherapy:    Immunosuppressants       None      "     Steroids:   Hormones (From admission, onward)      Start     Stop Route Frequency Ordered    03/28/24 1128  melatonin tablet 6 mg         -- Oral Nightly PRN 03/28/24 1028          Pressors:    Autonomic Drugs (From admission, onward)      None          Hyperglycemia/Diabetes Medications:   Antihyperglycemics (From admission, onward)      Start     Stop Route Frequency Ordered    04/04/24 0900  insulin detemir U-100 (Levemir) pen 12 Units         -- SubQ 2 times daily 04/04/24 0604    03/31/24 1130  insulin aspart U-100 pen 6-8 Units         -- SubQ 3 times daily with meals 03/31/24 0845    03/29/24 2346  insulin aspart U-100 pen 0-10 Units         -- SubQ Before meals, nightly and at 0200 PRN 03/29/24 3646

## 2024-04-04 NOTE — PLAN OF CARE
QUINCY spoke with Bryce at [facility], they have auth, but are having trouble contacting family to get paperwork signed. SW will assist her in getting the family to call her.     VANESSA Lewis, LORNA  [facility]  [id number]

## 2024-04-04 NOTE — ASSESSMENT & PLAN NOTE
BG goal: 140-180    - Levemir (Insulin Detemir) 12 units BID (20% reduction due to fasting blood glucose below goal.)  - Novolog (Insulin Aspart) 6-8 units with meals (Administer    6    units if patient eats 25-50% of meal, administer    8    units if patient eats > 50% of meal.) and prn for BG excursions [facility] [place] [place] SSI (150/25)  - BG checks /HS/0200   - Hypoglycemia protocol in place     ** Please notify Endocrine for any change and/or advance in diet**  ** Please call Endocrine for any BG related issues **     Discharge Planning:   TBD. Please notify endocrinology prior to discharge.

## 2024-04-04 NOTE — DISCHARGE SUMMARY
Aaron Berg - Telemetry [facility] Medicine  Discharge Summary      Patient Name: Saji Castañeda  MRN: [id number]  BREEZY: [id number]  Patient Class: IP- Inpatient  Admission Date: 3/5/2024  Hospital Length of Stay: 30 days  Discharge Date and Time:  04/04/2024 6:36 AM  Attending Physician: Jm Rosa MD   Discharging Provider: Jori Byrd MD  Primary Care Provider: [facility] -  [facility] Medicine Team: [facility] MED 4 Jori Byrd MD  Primary Care Team: [facility] 4    HPI:   Saji Castañeda is a 74 y.o. male with a medical history of DM2, HLD, HTN, chronic osteomyelitis of L heel, L TMA, PAD, hx of ESBL klebsiella, acinetobacter bacteremia, presenting with hyperglycemia. He presented to the ED via EMS and his POCT glucose on arrival was >500. Serum blood glucose 667 with anion gap of 17 and elevated ketones. Serum potassium 3.4, corrected sodium 149. Urinalysis significant for RBCs, glucosuria, and moderate bacteria and yeast. CBC revealed leukocytosis, elevated platelets, and mild anemia. CMP shows Na 135, K 3.4, BUN 29, Cr 2.1, Glucose 677, Lactate 4.61. BNP and troponin elevated. GFR 32.2. Insulin drip, IV fluids, zosyn, vancomycin, and potassium given.      He is unable to provide much history, but states that he has not been taking his home medications for at least a week. He reports shortness of breath, fatigue, and weakness for the last 6-7 days. He has chills and reports episodes of emesis. He denies abdominal pain, chest pain, palpitations, recent illness/infection, fevers, changes to bowel movements and urinary output. Patient lives with his brother and sister at home. He was last seen in the ED on 2/21 after a fall. He was hypoglycemic BGL 60 at that time which returned to normal after eating. He was also scheduled for a wound clinic appointment today 3/5 at [facility] with Dr. Wilson.        Procedure(s) (LRB):  AMPUTATION, ABOVE KNEE (Left)      Hospital Course:   Wound care and  cultures collected by Podiatry with recs for Vascular consult for amputation. Patient declined amputation. ID recommending IV Ertapenem for chronic osteomyelitis for 6 week duration (EED: 4/15/24). With high wound care needs and IV antibiotics plan patient needing long-term placement. He is being treated with topical permethrin for bedbugs. Wound care has been dressing the wounds. 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 Still adjusting insulin to control sugars better. Giving fluids for YNES. Poor PO intake even with feeding assistance. LTAC authorization denied by [employer]. Waiting for SNF placement ([facility], awaiting auth and family to complete paperwork). Patient started spiking fever again and infectious work up done. ID re-consulted. Patient has been counseled multiple times that source control cannot be achieved without amputation. Patient agreeable to AKA scheduled for 3/27. Transitioned to heparin from eliquis. Fung placed for urinary rentention. Discussed new hematuria with urology, hematuria resolved with repositioning of fung. Pt to receive 1u PRBC preoperatively per vascular surgery. S/p L AKA on 3/27 with vascular surgery. ID rec 24-48h post-op abx now that source control is achieved, unless further concerned for infection. Delirium precautions + trazodone added for sleep hygiene, rescheduled lab times appropriately. Resuming heparin drip + plavix 3/29 per vascular surg. 3/30 transitioned to eliquis from heparin gtt. LUE swelling noted 3/31, repeat DVT US of upper extremities were negative for DVT's. Hgb downtrending at 6.8, will receive 1u PRBC, no overt signs of bleeding. Febrile on 4/1 overnight to 100.6, possibly atelectasis or HAP for which he received 1 dose ertapenem. Blood cx collected, CXR showing possible opacity in mid right lung. Started on vanc/cefepime for HAP. EKG overnight 4/1-4/2 c/f complete AVB. Discussed with  electrophysiologists, pt has a leadless pacemaker implanted, no acute intervention indicated at this time. Cardiac device check showed functional pacemaker set to operate if HR reaches 40.     Goals of Care Treatment Preferences:  Code Status: Full Code    Health care agent: January Nicholson [phone]  Health care agent number: [phone]          What is most important right now is to focus on avoiding the hospital, remaining as independent as possible.  Accordingly, we have decided that the best plan to meet the patient's goals includes continuing with treatment: Full Code    Physical Exam  Vitals reviewed.   Constitutional:       General: He is not in acute distress.     Appearance: Normal appearance.   HENT:      Head: Normocephalic and atraumatic.      Right Ear: External ear normal.      Left Ear: External ear normal.      Nose: Nose normal.      Mouth/Throat:      Mouth: Mucous membranes are moist.   Eyes:      Extraocular Movements: Extraocular movements intact.      Pupils: Pupils are equal, round, and reactive to light.   Cardiovascular:      Rate and Rhythm: Regular rhythm. Bradycardia present.      Pulses: Normal pulses.      Heart sounds: Normal heart sounds.   Pulmonary:      Effort: Pulmonary effort is normal.      Breath sounds: Normal breath sounds. No wheezing.   Abdominal:      General: Abdomen is flat. There is no distension.      Palpations: Abdomen is soft.      Tenderness: There is no abdominal tenderness.   Musculoskeletal:         General: Swelling and deformity present. No tenderness or signs of injury. Normal range of motion.      Cervical back: Normal range of motion and neck supple. No rigidity.      Comments: RUE > LUE swelling  L AKA site CDI   Skin:     General: Skin is warm and dry.   Neurological:      General: No focal deficit present.      Mental Status: He is alert. Mental status is at baseline.   Psychiatric:         Mood and Affect: Mood normal.         Consults:    Consults (From admission, onward)          Status Ordering Provider     Inpatient consult to Infectious Diseases  Once        Provider:  (Not yet assigned)    Completed ELZBIETA SLIVA     Inpatient consult to Midline team  Once        Provider:  (Not yet assigned)    Completed COLIN ENNIS     Inpatient consult to Urology  Once        Provider:  (Not yet assigned)    Completed TONY NEW     Inpatient consult to Vascular Surgery  Once        Provider:  (Not yet assigned)    Completed LINA, DARRELL     Inpatient consult to Infectious Diseases  Once        Provider:  (Not yet assigned)    Completed LINA, DARRELL     Inpatient consult to Endocrinology  Once        Provider:  (Not yet assigned)    Completed LINA, DARRELL     Inpatient consult to Vascular Surgery  Once        Provider:  (Not yet assigned)    Completed EL, SAM     Inpatient consult to Midline team  Once        Provider:  (Not yet assigned)    Completed LINA, DARRELL     [facility]  Once        Provider:  (Not yet assigned)    Completed LINA, DARRELL     Inpatient consult to Podiatry  Once        Provider:  (Not yet assigned)    Completed LINA, DARRELL     Inpatient consult to Infectious Diseases  Once        Provider:  (Not yet assigned)    Completed LINA, DARRELL     Inpatient consult to Registered Dietitian/Nutritionist  Once        Provider:  (Not yet assigned)    Completed ANDRES WRIGHT     Inpatient consult to Registered Dietitian/Nutritionist  Once        Provider:  (Not yet assigned)    Completed ANDRES WRIGHT            No new Assessment & Plan notes have been filed under this hospital service since the last note was generated.  Service: Hospital Medicine    Final Active Diagnoses:    Diagnosis Date Noted POA    PRINCIPAL PROBLEM:  Osteomyelitis of left lower extremity [M86.9] 02/18/2021 Yes    Pacemaker [Z95.0] 04/02/2024 Yes     Chronic    Fever [R50.9] 04/01/2024 No    Edema due to hypervolemia  [E87.70] 03/31/2024 No    Urinary retention [R33.9] 03/20/2024 No    BPH (benign prostatic hyperplasia) [N40.0] 03/19/2024 Yes    Localized swelling of right upper extremity [R22.31] 03/14/2024 Yes    Proteus infection [A49.8] 03/09/2024 Yes    History of Bedbug bite with infection [T14.8XXA, L08.9] 03/09/2024 Yes    History of amputation of left high mid foot  [Z89.432] 03/07/2024 Not Applicable    Chronic anticoagulation [Z79.01] 03/07/2024 Not Applicable    Diabetic ketoacidosis without coma associated with type 2 diabetes mellitus [E11.10] 03/05/2024 Yes    Other hyperlipidemia [E78.49] 03/05/2024 Yes    Severe sepsis [A41.9, R65.20] 03/05/2024 Yes    Stage 3b chronic kidney disease [N18.32] 03/05/2024 Yes    AVB (atrioventricular block) [I44.30] 10/11/2023 Yes    Paroxysmal atrial fibrillation [I48.0] 10/02/2023 Yes     Chronic    Chronic deep vein thrombosis (DVT) of right upper extremity [I82.721] 07/06/2022 Yes     Chronic    Iron deficiency anemia [D50.9] 02/21/2021 Yes    Cellulitis of left lower leg [L03.116] 02/18/2021 Yes    Peripheral arterial disease [I73.9] 12/10/2020 Yes    YNES (acute kidney injury) [N17.9] 10/15/2018 Yes    Insulin dependent type 2 diabetes mellitus [E11.9, Z79.4] 09/23/2014 Not Applicable     Chronic    Essential hypertension [I10] 08/05/2014 Yes     Chronic      Problems Resolved During this Admission:    Diagnosis Date Noted Date Resolved POA    HAP (hospital-acquired pneumonia) [J18.9, Y95] 04/01/2024 04/01/2024 No    Elevated troponin [R79.89] 03/05/2024 03/07/2024 Yes       Discharged Condition: stable    Disposition: Another Health Care Inst*    Follow Up:    Patient Instructions:      Ambulatory referral/consult to Endocrinology   Standing Status: Future   Referral Priority: Routine Referral Type: Consultation   Requested Specialty: Endocrinology   Number of Visits Requested: 1       Significant Diagnostic Studies: N/A    Pending Diagnostic Studies:       Procedure  Component Value Units Date/Time    APTT [[id number]] Collected: 03/22/24 0811    Order Status: Sent Lab Status: No result     Specimen: Blood     APTT [[id number]] Collected: 03/20/24 1924    Order Status: Sent Lab Status: No result     Specimen: Blood     Basic metabolic panel [[id number]] Collected: 03/07/24 1414    Order Status: Sent Lab Status: In process Updated: 03/07/24 1414    Specimen: Blood            Medications:  Reconciled Home Medications:      Medication List        START taking these medications      LEVEMIR FLEXPEN 100 unit/mL (3 mL) Inpn pen  Generic drug: insulin detemir U-100 (Levemir)  Inject 12 Units into the skin 2 (two) times daily.     levoFLOXacin 750 MG tablet  Commonly known as: LEVAQUIN  Take 1 tablet (750 mg total) by mouth once daily. for 1 day            CHANGE how you take these medications      blood sugar diagnostic Strp  Commonly known as: CONTOUR TEST STRIPS  1 strip by Misc.(Non-Drug; Combo Route) route 4 (four) times daily. Use to check blood glucose 4x daily  What changed:   how to take this  when to take this  additional instructions     furosemide 20 MG tablet  Commonly known as: LASIX  Take 1 tablet (20 mg total) by mouth once daily.  What changed:   medication strength  when to take this     insulin aspart U-100 100 unit/mL (3 mL) Inpn pen  Commonly known as: NovoLOG  Inject 5 Units into the skin 3 (three) times daily with meals.  What changed: how much to take     isosorbide dinitrate 10 MG tablet  Commonly known as: ISORDIL  Take 1 tablet (10 mg total) by mouth 3 (three) times daily. Hold until follow up with a provider  What changed: additional instructions     lancets Misc  Commonly known as: MICROLET LANCET  1 each by Misc.(Non-Drug; Combo Route) route 4 (four) times daily. Use to check blood sugars 4x daily  What changed:   how much to take  how to take this  when to take this  additional instructions     NIFEdipine 90 MG (OSM) 24 hr tablet  Commonly known as:  "PROCARDIA-XL  Take 1 tablet (90 mg total) by mouth once daily.  What changed:   medication strength  how much to take            CONTINUE taking these medications      acetaminophen 325 MG tablet  Commonly known as: TYLENOL  Take 650 mg by mouth every 6 (six) hours as needed for Temperature greater than.     acidophilus-pectin, citrus 100 million cell-10 mg Cap  Take 1 capsule by mouth 2 (two) times a day.     apixaban 5 mg Tab  Commonly known as: ELIQUIS  Take 1 tablet (5 mg total) by mouth 2 (two) times daily.     ascorbic acid (vitamin C) 500 MG tablet  Commonly known as: VITAMIN C  Take 500 mg by mouth 2 (two) times daily.     B COMPLEX-VITAMIN B12 tablet  Generic drug: b complex vitamins  Take 1 tablet by mouth once daily.     BD AUTOSHIELD DUO PEN NEEDLE 30 gauge x 3/16" Ndle  Generic drug: pen needle,diabetic dual safty     * BD ULTRA-FINE ADITYA PEN NEEDLE 32 gauge x 5/32" Ndle  Generic drug: pen needle, diabetic  USE FOUR TIMES DAILY WITH MEALS AND NIGHTLY     * BD ULTRA-FINE ADITYA PEN NEEDLE 32 gauge x 5/32" Ndle  Generic drug: pen needle, diabetic  use as directed with insulin     clopidogreL 75 mg tablet  Commonly known as: PLAVIX  Take 1 tablet (75 mg total) by mouth once daily.     FLOMAX 0.4 mg Cap  Generic drug: tamsulosin  Take 0.4 mg by mouth once daily.     gabapentin 300 MG capsule  Commonly known as: NEURONTIN  Take 300 mg by mouth 2 (two) times daily.     multivitamin per tablet  Commonly known as: THERAGRAN  Take 1 tablet by mouth once daily.     pantoprazole 40 MG tablet  Commonly known as: PROTONIX  Take 40 mg by mouth once daily. Before breakfast     rosuvastatin 40 MG Tab  Commonly known as: CRESTOR  Take 40 mg by mouth once daily.           * This list has 2 medication(s) that are the same as other medications prescribed for you. Read the directions carefully, and ask your doctor or other care provider to review them with you.                STOP taking these medications      DAKIN'S " SOLUTION external solution  Generic drug: sodium hypochlorite 0.5 %     diphenhydrAMINE 25 mg capsule  Commonly known as: BENADRYL     glipiZIDE 10 MG tablet  Commonly known as: GLUCOTROL     hydrALAZINE 100 MG tablet  Commonly known as: APRESOLINE     LANTUS SOLOSTAR U-100 INSULIN glargine 100 units/mL SubQ pen  Generic drug: insulin     miconazole NITRATE 2 % 2 % top powder  Commonly known as: MICOTIN     oxyCODONE 5 mg Tbor     triamcinolone acetonide 0.025% 0.025 % Oint  Commonly known as: KENALOG              Indwelling Lines/Drains at time of discharge:   Lines/Drains/Airways       Drain  Duration                  Urethral Catheter 04/02/24 0330 2 days                    Time spent on the discharge of patient: 45 minutes         Jori Byrd MD  Department of Hospital Medicine  [facility] - Telemetry Stepdown

## 2024-04-04 NOTE — ASSESSMENT & PLAN NOTE
Resolved    Patient's FSGs are uncontrolled due to hyperglycemia on current medication regimen.  Last A1c reviewed-   Lab Results   Component Value Date    HGBA1C >14.0 (H) 03/05/2024     Most recent fingerstick glucose reviewed-   Recent Labs   Lab 04/03/24  0928 04/03/24  1140 04/03/24  1546 04/03/24 2002   POCTGLUCOSE 90 121* 107 120*       Current correctional scale   LD SSI  Maintain anti-hyperglycemic dose as follows-   Antihyperglycemics (From admission, onward)    Start     Stop Route Frequency Ordered    04/04/24 0900  insulin detemir U-100 (Levemir) pen 12 Units         -- SubQ 2 times daily 04/04/24 0604    03/31/24 1130  insulin aspart U-100 pen 6-8 Units         -- SubQ 3 times daily with meals 03/31/24 0845    03/29/24 2346  insulin aspart U-100 pen 0-10 Units         -- SubQ Before meals, nightly and at 0200 PRN 03/29/24 2247        Hold Oral hypoglycemics while patient is in the hospital.  Will keep patient on DKA pathway with insulin drip and fluid with protocol in place for switching to subcutaneous insulin as anion gap closes.      DKA    - Resolved  - Continue insulin regimen and BG checks TIDWM and QHS and 2am  - patient tends to become hypoglycemic around dinnertime  - could be insulin stacking from breakfast and lunch insulin aspart given poor PO intake despite assistance with feeding   - detemir 14 unit BID  - aspart scheduled w/ only breakfast and lunch and dinner with different units  -Inpatient consult to Endocrine

## 2024-04-04 NOTE — ASSESSMENT & PLAN NOTE
Regarding: feeling worse after taking antibiotics - intense pressure and feeling like she needs to urinate  ----- Message from Janice Marie sent at 2/16/2023  5:32 PM CST -----  Patient Name: Leana Baldwin    Full Name of Provider seen for current symptoms: seen in      Symptoms:  feeling worse after taking antibiotics - intense pressure and feeling like she needs to urinate     Pregnant (females aged 13-60. If Yes, how long?): No    Call Back #: [phone]     Clinic Site / Call Center Account # for provider seen for current symptoms: [phone]    Which State are you currently located in? (enter State name in Summary field):  [place]     Patients needing callback from the RN are informed of the following:   Please be aware the return phone call may come from an unidentified phone number or out of state phone number. Also keep in mind that call back times vary based on call volumes.  If your condition becomes life threatening while you wait for a callback, you should seek immediate medical assistance by calling 911 or going to the Emergency Department for evaluation.     Resolved.

## 2024-04-04 NOTE — CARE UPDATE
Spoke with January to inquire about paperwork for [facility]. She states that patient's niece will be on her way today to sign the paper work.

## 2024-04-04 NOTE — ASSESSMENT & PLAN NOTE
"Source control achieved with proximal amputation. Per ID can discontinue antimicrobials 24-48 hours after surgery as long term treatment is not indicated    This patient does have evidence of infective focus  My overall impression is sepsis.  Source: Skin and Soft Tissue (location left leg)  Antibiotics given-   Antibiotics (72h ago, onward)    Start     Stop Route Frequency Ordered    04/03/24 0945  levoFLOXacin tablet 750 mg         04/06/24 0859 Oral Daily 04/03/24 0837    03/31/24 2100  mupirocin 2 % ointment         04/05/24 2059 Nasl 2 times daily 03/31/24 1326        Latest lactate reviewed-  No results for input(s): "LACTATE", "POCLAC" in the last 72 hours.    Organ dysfunction indicated by Acute kidney injury    Fluid challenge Actual Body weight- Patient will receive 30ml/kg actual body weight to calculate fluid bolus for treatment of septic shock.     Post- resuscitation assessment No - Post resuscitation assessment not needed       Will Not start Pressors-     - See osteo    "

## 2024-04-04 NOTE — PROGRESS NOTES
[facility] - Telemetry [facility] Medicine  Progress Note    Patient Name: Saji Castañeda  MRN: [id number]  Patient Class: IP- Inpatient   Admission Date: 3/5/2024  Length of Stay: 30 days  Attending Physician: Jm Rosa MD  Primary Care Provider: [facility] -        Subjective:     Principal Problem:Osteomyelitis of left lower extremity        HPI:  Saji Castañeda is a 74 y.o. male with a medical history of DM2, HLD, HTN, chronic osteomyelitis of L heel, L TMA, PAD, hx of ESBL klebsiella, acinetobacter bacteremia, presenting with hyperglycemia. He presented to the ED via EMS and his POCT glucose on arrival was >500. Serum blood glucose 667 with anion gap of 17 and elevated ketones. Serum potassium 3.4, corrected sodium 149. Urinalysis significant for RBCs, glucosuria, and moderate bacteria and yeast. CBC revealed leukocytosis, elevated platelets, and mild anemia. CMP shows Na 135, K 3.4, BUN 29, Cr 2.1, Glucose 677, Lactate 4.61. BNP and troponin elevated. GFR 32.2. Insulin drip, IV fluids, zosyn, vancomycin, and potassium given.      He is unable to provide much history, but states that he has not been taking his home medications for at least a week. He reports shortness of breath, fatigue, and weakness for the last 6-7 days. He has chills and reports episodes of emesis. He denies abdominal pain, chest pain, palpitations, recent illness/infection, fevers, changes to bowel movements and urinary output. Patient lives with his brother and sister at home. He was last seen in the ED on 2/21 after a fall. He was hypoglycemic BGL 60 at that time which returned to normal after eating. He was also scheduled for a wound clinic appointment today 3/5 at [facility] with Dr. Wilson.        Overview/Hospital Course:  Wound care and cultures collected by Podiatry with recs for Vascular consult for amputation. Patient declined amputation. ID recommending IV Ertapenem for chronic osteomyelitis for 6 week duration  (EED: 4/15/24). With high wound care needs and IV antibiotics plan patient needing long-term placement. He is being treated with topical permethrin for bedbugs. Wound care has been dressing the wounds. 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 Still adjusting insulin to control sugars better. Giving fluids for YNES. Poor PO intake even with feeding assistance. LTAC authorization denied by [employer]. Waiting for SNF placement ([facility], awaiting auth and family to complete paperwork). Patient started spiking fever again and infectious work up done. ID re-consulted. Patient has been counseled multiple times that source control cannot be achieved without amputation. Patient agreeable to AKA scheduled for 3/27. Transitioned to heparin from eliquis. Fung placed for urinary rentention. Discussed new hematuria with urology, hematuria resolved with repositioning of fung. Pt to receive 1u PRBC preoperatively per vascular surgery. S/p L AKA on 3/27 with vascular surgery. ID rec 24-48h post-op abx now that source control is achieved, unless further concerned for infection. Delirium precautions + trazodone added for sleep hygiene, rescheduled lab times appropriately. Resuming heparin drip + plavix 3/29 per vascular surg. 3/30 transitioned to eliquis from heparin gtt. LUE swelling noted 3/31, repeat DVT US of upper extremities were negative for DVT's. Hgb downtrending at 6.8, will receive 1u PRBC, no overt signs of bleeding. Febrile on 4/1 overnight to 100.6, possibly atelectasis or HAP for which he received 1 dose ertapenem. Blood cx collected, CXR showing possible opacity in mid right lung. Started on vanc/cefepime for HAP. EKG overnight 4/1-4/2 c/f complete AVB. Discussed with electrophysiologists, pt has a leadless pacemaker implanted, no acute intervention indicated at this time. Cardiac device check showed functional pacemaker set to operate if HR reaches 40. Pt  stable for discharge pending placement.    Interval History: NAEON. Pt comfortable, no complaints pending d/c      Objective:     Vital Signs (Most Recent):  Temp: 98.6 °F (37 °C) (04/04/24 0759)  Pulse: (!) 44 (04/04/24 0759)  Resp: 18 (04/04/24 0759)  BP: 135/63 (04/04/24 0759)  SpO2: 100 % (04/04/24 0759) Vital Signs (24h Range):  Temp:  [98 °F (36.7 °C)-99.3 °F (37.4 °C)] 98.6 °F (37 °C)  Pulse:  [44-82] 44  Resp:  [15-19] 18  SpO2:  [96 %-100 %] 100 %  BP: (123-151)/(53-67) 135/63     Weight: 119.7 kg (264 lb)  Body mass index is 38.99 kg/m².    Intake/Output Summary (Last 24 hours) at 4/4/2024 0846  Last data filed at 4/3/2024 1800  Gross per 24 hour   Intake 250 ml   Output 800 ml   Net -550 ml         Physical Exam  Constitutional:       General: He is not in acute distress.     Appearance: Normal appearance. He is well-developed. Ill appearance: chronically ill appearing.   HENT:      Head: Normocephalic and atraumatic.      Right Ear: External ear normal.      Left Ear: External ear normal.      Nose: Nose normal.   Eyes:      General:         Right eye: No discharge.         Left eye: No discharge.      Conjunctiva/sclera: Conjunctivae normal.   Cardiovascular:      Rate and Rhythm: Normal rate and regular rhythm.      Pulses: Normal pulses.   Pulmonary:      Effort: Pulmonary effort is normal. No respiratory distress.      Breath sounds: No stridor.   Abdominal:      General: Abdomen is flat.      Palpations: Abdomen is soft.      Tenderness: There is no abdominal tenderness.   Musculoskeletal:         General: Swelling and deformity present.      Cervical back: Normal range of motion.      Comments: S/p L-AKA, bandage CDI  RUE swelling   Skin:     General: Skin is warm and dry.   Neurological:      General: No focal deficit present.      Mental Status: He is alert and oriented to person, place, and time.             Significant Labs: All pertinent labs within the past 24 hours have been  reviewed.    Significant Imaging: I have reviewed all pertinent imaging results/findings within the past 24 hours.    Assessment/Plan:      * Osteomyelitis of left lower extremity  Resolved w/ source control    Patient with Proteus and enterobacter on leg cultures collected by Podiatry with concern for acute on chronic osteo. Fungal culture grew fusarium. He has started spiking fever and still is not agreeable to amputation, will do infectious work up to look out for source.    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 Can discontinue antimicrobials 24-48 hours after surgery as long term treatment is not indicated.       Pacemaker  Pt with leadless [employer] pacemaker in place, per device rep set to operate if his HR reaches 40      Fever  Febrile on 4/1 to 100.6. Small right mid lung opacity seen on CXR. Could be HAP vs UTI from Fung vs atelectasis in setting of post of fever after AKA on 3/27. Will trend fever. Not hypotensive. No leukocytosis. New upper extremity DVTs ruled out.     - Blood cx repeated  - Procal pending  - f/u RIP, respiratory culture, UA, MRSA PCR -> all negative to date 4/2  - Fung removed -> replaced d/t retention  - will cover empirically with cefepime, vanc d/c'ed s/p negative MRSA PCR --> switched to levaquin, will complete 5d course      Edema due to hypervolemia  Pt with developing extremity edema, partially d/t known RUE DVT which doesn't explain the LUE/RLE edema. Will trial diuresis, obtain echo, and DVT US for new LUE swelling which was negative.     - now on RA  - resolving with diuresis      Urinary retention  Patient developed new urinary retention.     -Fung's placed by urology - Dc'd on 4/1. Voiding trial failed 4/1, fung replaced  -Flomax  -Renal  ultrasound showed no hydronephrosis  - Making adequate urine with fung, c/f hematuria which developed 3/25 and resolved with repositioning of fung. See urology note from same date  - voiding trial prior to DC. If unable to void, urology f/u outpatient       BPH (benign prostatic hyperplasia)  Failed voiding trial 4/1, fung replaced    Fung in placed, placed by urology  Flomax daily      Localized swelling of right upper extremity  Resolved, negative DVT US 3/31    Patient has swelling and pain in the right arm and some swelling in the left arm as well    -US b/l upper extremity for DVT showed clot  - transitioned from heparin to eliquis 3/30  - Increasing diuresis     History of Bedbug bite with infection  Patient started on Permethrin daily for 5 days starting 3/12      Proteus infection  Abx d/c'ed 3/30    Was on ertapenem to cover proteus/enterobacter growing from leg wound; source control achieved s/p L AKA    Chronic anticoagulation  - See DVT      History of amputation of left high mid foot   See osteo      Stage 3b chronic kidney disease  Creatine stable for now. BMP reviewed- noted Estimated Creatinine Clearance: 90.6 mL/min (based on SCr of 0.9 mg/dL). according to latest data. Based on current GFR, CKD stage is stage 3 - GFR 30-59.  Monitor UOP and serial BMP and adjust therapy as needed. Renally dose meds. Avoid nephrotoxic medications and procedures.    Severe sepsis  Source control achieved with proximal amputation. Per ID can discontinue antimicrobials 24-48 hours after surgery as long term treatment is not indicated    This patient does have evidence of infective focus  My overall impression is sepsis.  Source: Skin and Soft Tissue (location left leg)  Antibiotics given-   Antibiotics (72h ago, onward)      Start     Stop Route Frequency Ordered    04/03/24 0945  levoFLOXacin tablet 750 mg         04/06/24 0859 Oral Daily 04/03/24 0837    03/31/24 2100  mupirocin 2 % ointment         04/05/24 2059  "Nasl 2 times daily 03/31/24 1326          Latest lactate reviewed-  No results for input(s): "LACTATE", "POCLAC" in the last 72 hours.    Organ dysfunction indicated by Acute kidney injury    Fluid challenge Actual Body weight- Patient will receive 30ml/kg actual body weight to calculate fluid bolus for treatment of septic shock.     Post- resuscitation assessment No - Post resuscitation assessment not needed       Will Not start Pressors-     - See osteo      Other hyperlipidemia  Continue home atorvastatin      Diabetic ketoacidosis without coma associated with type 2 diabetes mellitus  Resolved    Patient's FSGs are uncontrolled due to hyperglycemia on current medication regimen.  Last A1c reviewed-   Lab Results   Component Value Date    HGBA1C >14.0 (H) 03/05/2024     Most recent fingerstick glucose reviewed-   Recent Labs   Lab 04/03/24  0928 04/03/24  1140 04/03/24  1546 04/03/24 2002   POCTGLUCOSE 90 121* 107 120*       Current correctional scale   LD SSI  Maintain anti-hyperglycemic dose as follows-   Antihyperglycemics (From admission, onward)      Start     Stop Route Frequency Ordered    04/04/24 0900  insulin detemir U-100 (Levemir) pen 12 Units         -- SubQ 2 times daily 04/04/24 0604    03/31/24 1130  insulin aspart U-100 pen 6-8 Units         -- SubQ 3 times daily with meals 03/31/24 0845    03/29/24 2346  insulin aspart U-100 pen 0-10 Units         -- SubQ Before meals, nightly and at 0200 PRN 03/29/24 2247          Hold Oral hypoglycemics while patient is in the hospital.  Will keep patient on DKA pathway with insulin drip and fluid with protocol in place for switching to subcutaneous insulin as anion gap closes.      DKA    - Resolved  - Continue insulin regimen and BG checks TIDWM and QHS and 2am  - patient tends to become hypoglycemic around dinnertime  - could be insulin stacking from breakfast and lunch insulin aspart given poor PO intake despite assistance with feeding   - detemir 14 unit " BID  - aspart scheduled w/ only breakfast and lunch and dinner with different units  -Inpatient consult to Endocrine    AVB (atrioventricular block)  Bradycardia on tele noted night of 4/1 with HR of 48, pt asymptomatic. EKG c/f complete AVB. Tele morning of 4/2 showing junctional escape rhythm with HR 60s-70s. Electrophysiology consulted, pt has leadless pacemaker in place already set at 50bpm. Will interrogate device, no acute cardiology intervention indicated at this time.       Paroxysmal atrial fibrillation  Patient with Persistent (7 days or more) atrial fibrillation which is uncontrolled currently with    . Patient is currently in sinus rhythm.[id number] Score: 4. . Anticoagulation indicated. Anticoagulation done with heparin/eliquis .    - ac with eliquis 5 BID resumed 3/30    Chronic deep vein thrombosis (DVT) of right upper extremity  Resolved    - eliquis transitioned to Heparin which was paused temporarily d/t concern of new hematuria which developed 3/25. Discussed with urology, the hematuria resolved with repositioning of the fung. Heparin resumed, but will temporarily be paused as patient needs preoperative blood transfusion and has minimal IV access d/t placement difficulties & limb alert on R-arm d/t DVT    - resumed eliquis 3/30     - 3/31 new LUE swelling c/f DVT, US bilateral negative for new DVT - continue eliquis 5 BID      Iron deficiency anemia  Patient's anemia is currently uncontrolled. Has received 2 units of PRBCs on 3/23 and 3/16 . Etiology likely d/t chronic blood loss and Iron deficiency. Receiving 1u on 3/26 preoperatively    Current CBC reviewed-   Lab Results   Component Value Date    HGB 8.4 (L) 04/03/2024    HCT 27.6 (L) 04/03/2024     Monitor serial CBC and transfuse if patient becomes hemodynamically unstable, symptomatic or H/H drops below 7/21.    Cellulitis of left lower leg  Resolved w/ source control, see osteo      Peripheral arterial disease  Resumed plavix 3/29      YNES  (acute kidney injury)  Resolved    Patient with acute kidney injury/acute renal failure likely due to pre-renal azotemia due to dehydration and post-obstructive d/t urinary retention  YNES is currently stable. Baseline creatinine  1.1  - Labs reviewed- Renal function/electrolytes with Estimated Creatinine Clearance: 90.6 mL/min (based on SCr of 0.9 mg/dL). according to latest data. Monitor urine output and serial BMP and adjust therapy as needed. Avoid nephrotoxins and renally dose meds for GFR listed above.    Creatinine 2.1 on admit, baseline around 1.1  - prerenal vs obstructive    Lab Results   Component Value Date    CREATININE 0.9 04/03/2024       Plan:   4/2 failed voiding trial w/ pt retaining >1.5L urine on scan, fung replaced  - Strict I&Os and daily weights   - Daily BMP  - Trend sCr  - Avoid nephrotoxic agents such as NSAIDs, ACEi, ARBs and IV radiocontrast.  - Renally dose meds to current GFR.  - Maintain MAP > 65.  - No indications for HD at this time.   - Maintain electrolytes at goal: Mg > 2, Phos > 3, K > 4.           Insulin dependent type 2 diabetes mellitus  Insulin mgmt per endocrine    Patient's FSGs are uncontrolled due to hyperglycemia on current medication regimen.  Last A1c reviewed-   Lab Results   Component Value Date    HGBA1C >14.0 (H) 03/05/2024     Most recent fingerstick glucose reviewed-   Recent Labs   Lab 04/03/24  0928 04/03/24  1140 04/03/24  1546 04/03/24 2002   POCTGLUCOSE 90 121* 107 120*       Current correctional scale  Low  Increase anti-hyperglycemic dose as follows-   Antihyperglycemics (From admission, onward)      Start     Stop Route Frequency Ordered    04/04/24 0900  insulin detemir U-100 (Levemir) pen 12 Units         -- SubQ 2 times daily 04/04/24 0604    03/31/24 1130  insulin aspart U-100 pen 6-8 Units         -- SubQ 3 times daily with meals 03/31/24 0845    03/29/24 2346  insulin aspart U-100 pen 0-10 Units         -- SubQ Before meals, nightly and at 0200  PRN 03/29/24 0027          Hold Oral hypoglycemics while patient is in the hospital.    - Endocrine's following an making insulin adjustments as needed.    Essential hypertension  Chronic, uncontrolled. Latest blood pressure and vitals reviewed-     Temp:  [98 °F (36.7 °C)-99.3 °F (37.4 °C)]   Pulse:  [44-82]   Resp:  [15-19]   BP: (123-151)/(53-67)   SpO2:  [96 %-100 %] .   Home meds for hypertension were reviewed and noted below.   Hypertension Medications               furosemide (LASIX) 40 MG tablet Take 20 mg by mouth.    hydrALAZINE (APRESOLINE) 100 MG tablet Take 1 tablet (100 mg total) by mouth every 8 (eight) hours.    isosorbide dinitrate (ISORDIL) 10 MG tablet Take 1 tablet (10 mg total) by mouth 3 (three) times daily.    NIFEdipine (PROCARDIA-XL) 60 MG (OSM) 24 hr tablet Take 1 tablet (60 mg total) by mouth once daily.            While in the hospital, will manage blood pressure as follows; Adjust home antihypertensive regimen as follows- will keep nifedipine - increased to 90     Will utilize p.r.n. blood pressure medication only if patient's blood pressure greater than 180/110 and he develops symptoms such as worsening chest pain or shortness of breath.      VTE Risk Mitigation (From admission, onward)           Ordered     apixaban tablet 5 mg  2 times daily         03/30/24 1346     heparin 25,000 units in dextrose 5% 250 mL (100 units/mL) infusion HIGH INTENSITY nomogram - [facility]  Continuous        Question:  Begin at (units/kg/hr)  Answer:  18 03/20/24 0710                    Discharge Planning   OXANA: 4/4/2024     Code Status: Full Code   Is the patient medically ready for discharge?: No    Reason for patient still in hospital (select all that apply): Pending disposition  Discharge Plan A: Skilled Nursing Facility   Discharge Delays: None known at this time              Jori Byrd MD  Department of Hospital Medicine   [facility] - Telemetry Stepdown

## 2024-04-04 NOTE — ASSESSMENT & PLAN NOTE
Insulin mgmt per endocrine    Patient's FSGs are uncontrolled due to hyperglycemia on current medication regimen.  Last A1c reviewed-   Lab Results   Component Value Date    HGBA1C >14.0 (H) 03/05/2024     Most recent fingerstick glucose reviewed-   Recent Labs   Lab 04/03/24  0928 04/03/24  1140 04/03/24  1546 04/03/24 2002   POCTGLUCOSE 90 121* 107 120*       Current correctional scale  Low  Increase anti-hyperglycemic dose as follows-   Antihyperglycemics (From admission, onward)    Start     Stop Route Frequency Ordered    04/04/24 0900  insulin detemir U-100 (Levemir) pen 12 Units         -- SubQ 2 times daily 04/04/24 0604    03/31/24 1130  insulin aspart U-100 pen 6-8 Units         -- SubQ 3 times daily with meals 03/31/24 0845    03/29/24 2346  insulin aspart U-100 pen 0-10 Units         -- SubQ Before meals, nightly and at 0200 PRN 03/29/24 2247        Hold Oral hypoglycemics while patient is in the hospital.    - Endocrine's following an making insulin adjustments as needed.

## 2024-04-04 NOTE — ASSESSMENT & PLAN NOTE
No back pain since removal kidney stone Resolved    - eliquis transitioned to Heparin which was paused temporarily d/t concern of new hematuria which developed 3/25. Discussed with urology, the hematuria resolved with repositioning of the fung. Heparin resumed, but will temporarily be paused as patient needs preoperative blood transfusion and has minimal IV access d/t placement difficulties & limb alert on R-arm d/t DVT    - resumed eliquis 3/30     - 3/31 new LUE swelling c/f DVT, US bilateral negative for new DVT - continue eliquis 5 BID

## 2024-04-05 VITALS
TEMPERATURE: 99 F | WEIGHT: 263.88 LBS | DIASTOLIC BLOOD PRESSURE: 70 MMHG | HEIGHT: 69 IN | BODY MASS INDEX: 39.08 KG/M2 | OXYGEN SATURATION: 98 % | SYSTOLIC BLOOD PRESSURE: 140 MMHG | RESPIRATION RATE: 20 BRPM | HEART RATE: 45 BPM

## 2024-04-05 LAB
ALBUMIN SERPL BCP-MCNC: 1.3 G/DL (ref 3.5–5.2)
ALP SERPL-CCNC: 158 U/L (ref 55–135)
ALT SERPL W/O P-5'-P-CCNC: 28 U/L (ref 10–44)
ANION GAP SERPL CALC-SCNC: 7 MMOL/L (ref 8–16)
AST SERPL-CCNC: 49 U/L (ref 10–40)
BASOPHILS # BLD AUTO: 0.03 K/UL (ref 0–0.2)
BASOPHILS NFR BLD: 0.3 % (ref 0–1.9)
BILIRUB SERPL-MCNC: 0.2 MG/DL (ref 0.1–1)
BUN SERPL-MCNC: 12 MG/DL (ref 8–23)
CALCIUM SERPL-MCNC: 7.9 MG/DL (ref 8.7–10.5)
CHLORIDE SERPL-SCNC: 103 MMOL/L (ref 95–110)
CO2 SERPL-SCNC: 29 MMOL/L (ref 23–29)
CREAT SERPL-MCNC: 1 MG/DL (ref 0.5–1.4)
DIFFERENTIAL METHOD BLD: ABNORMAL
EOSINOPHIL # BLD AUTO: 0.6 K/UL (ref 0–0.5)
EOSINOPHIL NFR BLD: 6 % (ref 0–8)
ERYTHROCYTE [DISTWIDTH] IN BLOOD BY AUTOMATED COUNT: 16.1 % (ref 11.5–14.5)
EST. GFR  (NO RACE VARIABLE): >60 ML/MIN/1.73 M^2
GLUCOSE SERPL-MCNC: 137 MG/DL (ref 70–110)
HCT VFR BLD AUTO: 25.3 % (ref 40–54)
HGB BLD-MCNC: 7.7 G/DL (ref 14–18)
IMM GRANULOCYTES # BLD AUTO: 0.06 K/UL (ref 0–0.04)
IMM GRANULOCYTES NFR BLD AUTO: 0.6 % (ref 0–0.5)
LYMPHOCYTES # BLD AUTO: 1.2 K/UL (ref 1–4.8)
LYMPHOCYTES NFR BLD: 12.6 % (ref 18–48)
MAGNESIUM SERPL-MCNC: 1.8 MG/DL (ref 1.6–2.6)
MCH RBC QN AUTO: 25 PG (ref 27–31)
MCHC RBC AUTO-ENTMCNC: 30.4 G/DL (ref 32–36)
MCV RBC AUTO: 82 FL (ref 82–98)
MONOCYTES # BLD AUTO: 0.7 K/UL (ref 0.3–1)
MONOCYTES NFR BLD: 7.2 % (ref 4–15)
NEUTROPHILS # BLD AUTO: 7 K/UL (ref 1.8–7.7)
NEUTROPHILS NFR BLD: 73.3 % (ref 38–73)
NRBC BLD-RTO: 0 /100 WBC
PHOSPHATE SERPL-MCNC: 2.5 MG/DL (ref 2.7–4.5)
PLATELET # BLD AUTO: 560 K/UL (ref 150–450)
PMV BLD AUTO: 9.3 FL (ref 9.2–12.9)
POCT GLUCOSE: 133 MG/DL (ref 70–110)
POCT GLUCOSE: 150 MG/DL (ref 70–110)
POCT GLUCOSE: 162 MG/DL (ref 70–110)
POCT GLUCOSE: 164 MG/DL (ref 70–110)
POTASSIUM SERPL-SCNC: 3.5 MMOL/L (ref 3.5–5.1)
PROT SERPL-MCNC: 5.6 G/DL (ref 6–8.4)
RBC # BLD AUTO: 3.08 M/UL (ref 4.6–6.2)
SODIUM SERPL-SCNC: 139 MMOL/L (ref 136–145)
WBC # BLD AUTO: 9.57 K/UL (ref 3.9–12.7)

## 2024-04-05 PROCEDURE — 85025 COMPLETE CBC W/AUTO DIFF WBC: CPT

## 2024-04-05 PROCEDURE — 83735 ASSAY OF MAGNESIUM: CPT

## 2024-04-05 PROCEDURE — 25000003 PHARM REV CODE 250

## 2024-04-05 PROCEDURE — 80053 COMPREHEN METABOLIC PANEL: CPT

## 2024-04-05 PROCEDURE — 25000003 PHARM REV CODE 250: Performed by: STUDENT IN AN ORGANIZED HEALTH CARE EDUCATION/TRAINING PROGRAM

## 2024-04-05 PROCEDURE — 84100 ASSAY OF PHOSPHORUS: CPT

## 2024-04-05 PROCEDURE — 97530 THERAPEUTIC ACTIVITIES: CPT | Mod: CO

## 2024-04-05 PROCEDURE — A4216 STERILE WATER/SALINE, 10 ML: HCPCS

## 2024-04-05 PROCEDURE — 97535 SELF CARE MNGMENT TRAINING: CPT | Mod: CO

## 2024-04-05 PROCEDURE — 99232 SBSQ HOSP IP/OBS MODERATE 35: CPT | Mod: ,,, | Performed by: NURSE PRACTITIONER

## 2024-04-05 RX ORDER — LEVOFLOXACIN 750 MG/1
750 TABLET ORAL DAILY
Qty: 1 TABLET | Refills: 0
Start: 2024-04-05 | End: 2024-04-06

## 2024-04-05 RX ORDER — INSULIN ASPART 100 [IU]/ML
5 INJECTION, SOLUTION INTRAVENOUS; SUBCUTANEOUS
Qty: 3 EACH | Refills: 11
Start: 2024-04-05 | End: 2025-04-05

## 2024-04-05 RX ORDER — POTASSIUM CHLORIDE 20 MEQ/1
40 TABLET, EXTENDED RELEASE ORAL ONCE
Status: COMPLETED | OUTPATIENT
Start: 2024-04-05 | End: 2024-04-05

## 2024-04-05 RX ORDER — LANCETS
1 EACH MISCELLANEOUS 4 TIMES DAILY
Refills: 0
Start: 2024-04-05

## 2024-04-05 RX ORDER — TALC
1 POWDER (GRAM) TOPICAL ONCE
Status: DISCONTINUED | OUTPATIENT
Start: 2024-04-05 | End: 2024-04-05

## 2024-04-05 RX ADMIN — ATORVASTATIN CALCIUM 40 MG: 40 TABLET, FILM COATED ORAL at 09:04

## 2024-04-05 RX ADMIN — INSULIN ASPART 2 UNITS: 100 INJECTION, SOLUTION INTRAVENOUS; SUBCUTANEOUS at 12:04

## 2024-04-05 RX ADMIN — CLOPIDOGREL BISULFATE 75 MG: 75 TABLET ORAL at 09:04

## 2024-04-05 RX ADMIN — Medication 10 ML: at 06:04

## 2024-04-05 RX ADMIN — TAMSULOSIN HYDROCHLORIDE 0.4 MG: 0.4 CAPSULE ORAL at 09:04

## 2024-04-05 RX ADMIN — APIXABAN 5 MG: 5 TABLET, FILM COATED ORAL at 09:04

## 2024-04-05 RX ADMIN — PANTOPRAZOLE SODIUM 40 MG: 40 TABLET, DELAYED RELEASE ORAL at 09:04

## 2024-04-05 RX ADMIN — POTASSIUM CHLORIDE 40 MEQ: 1500 TABLET, EXTENDED RELEASE ORAL at 02:04

## 2024-04-05 RX ADMIN — COLLAGENASE SANTYL: 250 OINTMENT TOPICAL at 09:04

## 2024-04-05 RX ADMIN — MUPIROCIN: 20 OINTMENT TOPICAL at 09:04

## 2024-04-05 RX ADMIN — INSULIN ASPART 6 UNITS: 100 INJECTION, SOLUTION INTRAVENOUS; SUBCUTANEOUS at 12:04

## 2024-04-05 RX ADMIN — GABAPENTIN 300 MG: 300 CAPSULE ORAL at 09:04

## 2024-04-05 RX ADMIN — Medication 10 ML: at 12:04

## 2024-04-05 RX ADMIN — NIFEDIPINE 90 MG: 30 TABLET, FILM COATED, EXTENDED RELEASE ORAL at 09:04

## 2024-04-05 RX ADMIN — INSULIN ASPART 6 UNITS: 100 INJECTION, SOLUTION INTRAVENOUS; SUBCUTANEOUS at 08:04

## 2024-04-05 RX ADMIN — LEVOFLOXACIN 750 MG: 750 TABLET, FILM COATED ORAL at 09:04

## 2024-04-05 RX ADMIN — INSULIN ASPART 6 UNITS: 100 INJECTION, SOLUTION INTRAVENOUS; SUBCUTANEOUS at 05:04

## 2024-04-05 RX ADMIN — MAGNESIUM OXIDE TAB 400 MG (241.3 MG ELEMENTAL MG) 200 MG: 400 (241.3 MG) TAB at 03:04

## 2024-04-05 NOTE — ASSESSMENT & PLAN NOTE
BG goal: 140-180    - Levemir (Insulin Detemir) 10 units BID (20% reduction due to fasting blood glucose below goal.)  - Novolog (Insulin Aspart) 6-8 units with meals (Administer    6    units if patient eats 25-50% of meal, administer    8    units if patient eats > 50% of meal.) and prn for BG excursions [facility] [place] (150/25)  - BG checks /HS/0200   - Hypoglycemia protocol in place     ** Please notify Endocrine for any change and/or advance in diet**  ** Please call Endocrine for any BG related issues **     Discharge Planning:   TBD. Please notify endocrinology prior to discharge.

## 2024-04-05 NOTE — SUBJECTIVE & OBJECTIVE
"Interval HPI:   Overnight events: No acute events overnight. Patient in room 8078/8078 A. Blood glucose stable. BG at and below goal on current insulin regimen (SSI, prandial, and basal insulin ). Steroid use- None. 9 Days Post-Op  Renal function- Normal   Vasopressors-  None       Endocrine will continue to follow and manage insulin orders inpatient.         Diet diabetic  Calorie     Eatin%  Nausea: No  Hypoglycemia and intervention: No  Fever: No  TPN and/or TF: No      BP (!) 165/65 (Patient Position: Lying)   Pulse (!) 45   Temp 99.7 °F (37.6 °C) (Oral)   Resp 20   Ht 5' 9" (1.753 m)   Wt 119.7 kg (263 lb 14.3 oz)   SpO2 97%   BMI 38.97 kg/m²     Labs Reviewed and Include    No results for input(s): "GLU", "CALCIUM", "ALBUMIN", "PROT", "NA", "K", "CO2", "CL", "BUN", "CREATININE", "ALKPHOS", "ALT", "AST", "BILITOT" in the last 24 hours.  Lab Results   Component Value Date    WBC 8.90 2024    HGB 8.4 (L) 2024    HCT 27.6 (L) 2024    MCV 83 2024     (H) 2024     No results for input(s): "TSH", "FREET4" in the last 168 hours.  Lab Results   Component Value Date    HGBA1C >14.0 (H) 2024       Nutritional status:   Body mass index is 38.97 kg/m².  Lab Results   Component Value Date    ALBUMIN 1.2 (L) 2024    ALBUMIN 1.4 (L) 2024    ALBUMIN 1.4 (L) 2024     Lab Results   Component Value Date    PREALBUMIN 17 (L) 2014    PREALBUMIN 16 (L) 2014    PREALBUMIN 19 (L) 2014       Estimated Creatinine Clearance: 90.6 mL/min (based on SCr of 0.9 mg/dL).    Accu-Checks  Recent Labs     24  0818 24  0928 24  1140 24  1546 24  0819 24  1218 24  1237 24  1635 24  2055   POCTGLUCOSE 81 90 121* 107 120* 100 153* 128* 150* 98       Current Medications and/or Treatments Impacting Glycemic Control  Immunotherapy:    Immunosuppressants       None          Steroids: "   Hormones (From admission, onward)      Start     Stop Route Frequency Ordered    03/28/24 1128  melatonin tablet 6 mg         -- Oral Nightly PRN 03/28/24 1028          Pressors:    Autonomic Drugs (From admission, onward)      None          Hyperglycemia/Diabetes Medications:   Antihyperglycemics (From admission, onward)      Start     Stop Route Frequency Ordered    04/05/24 0900  insulin detemir U-100 (Levemir) pen 10 Units         -- SubQ 2 times daily 04/05/24 0828    03/31/24 1130  insulin aspart U-100 pen 6-8 Units         -- SubQ 3 times daily with meals 03/31/24 0845    03/29/24 2346  insulin aspart U-100 pen 0-10 Units         -- SubQ Before meals, nightly and at 0200 PRN 03/29/24 0704

## 2024-04-05 NOTE — NURSING
Nurses Note -- 4 Eyes      4/5/2024   0700AM      Skin assessed during: Q Shift Change      [] No Altered Skin Integrity Present    []Prevention Measures Documented      [x] Yes- Altered Skin Integrity Present or Discovered   [] LDA Added if Not in Epic (Describe Wound)   [] New Altered Skin Integrity was Present on Admit and Documented in LDA   [] Wound Image Taken    Wound Care Consulted? No    Attending Nurse:  Yue Rehman RN/Staff Member:   Marlena

## 2024-04-05 NOTE — PLAN OF CARE
Problem: Adult Inpatient Plan of Care  Goal: Plan of Care Review  Outcome: Met  Goal: Patient-Specific Goal (Individualized)  Outcome: Met  Goal: Absence of Hospital-Acquired Illness or Injury  Outcome: Met  Goal: Optimal Comfort and Wellbeing  Outcome: Met  Goal: Readiness for Transition of Care  Outcome: Met     Problem: Diabetes Comorbidity  Goal: Blood Glucose Level Within Targeted Range  Outcome: Met     Problem: Adjustment to Illness (Sepsis/Septic Shock)  Goal: Optimal Coping  Outcome: Met   Patient discharged to [facility]. Midline and PIV removed and dressing placed after hemostasis achieved. Discharge information included with other paperwork in patient OTC medication/paperwork.

## 2024-04-05 NOTE — PT/OT/SLP PROGRESS
Occupational Therapy   Treatment    Name: Saji Castañeda  MRN: [id number]  Admitting Diagnosis:  Osteomyelitis of left lower extremity  9 Days Post-Op    Recommendations:     Discharge Recommendations: Moderate Intensity Therapy  Discharge Equipment Recommendations:  lift device, hospital bed  Barriers to discharge:  Other (Comment) (increased skilled assistance required for ADLs and functional mobility/transfers)    Assessment:     Saji Castañeda is a 74 y.o. male with a medical diagnosis of Osteomyelitis of left lower extremity.  He presents with the following performance deficits affecting function are weakness, impaired functional mobility, impaired cognition, decreased safety awareness, pain, decreased coordination, impaired endurance, gait instability, impaired balance, impaired skin, impaired joint extensibility, orthopedic precautions, edema, decreased ROM, decreased lower extremity function, impaired self care skills, decreased upper extremity function, impaired cardiopulmonary response to activity.     Pt soiled upon arrival in room with bowel movement and required 2-person assistance for natalie-hygiene, bed mobility, and UB dressing. L AKA sutures intact and no drainage noted. Nursing notified of L stump not wrapped with ace bandage and limb prosthetic brace upon arrival in room.    Rehab Prognosis:  Fair; patient would benefit from acute skilled OT services to address these deficits and reach maximum level of function.       Plan:     Patient to be seen 2 x/week to address the above listed problems via self-care/home management, therapeutic activities, therapeutic exercises, neuromuscular re-education  Plan of Care Expires: 04/11/24  Plan of Care Reviewed with: patient    Subjective     Chief Complaint: Need to get cleaned up since he had a bowel movement  Patient/Family Comments/goals: Pt agreeable to tx session  Pain/Comfort:  Pain Rating 1: 0/10    Objective:     Communicated with: nurse (Yue) prior to  session.  Patient found supine with pulse ox (continuous), telemetry, fung catheter upon OT entry to room. Pt soiled with bowel movement and dressing on sacral region; KATHLEEN notified  via call button and nurse. Eden intact of L AKA and no drainage. Limb brace and ace bandage not donned on L stump    General Precautions: Standard, fall, contact, diabetic    Orthopedic Precautions:LLE non weight bearing, RLE weight bearing as tolerated  Braces:  (R darco shoe and  L AKA limb prosthetic brace)  Respiratory Status: Room air     Occupational Performance:     Bed Mobility:    Patient completed Rolling/Turning to Left with  maximal assistance, 2 persons (nurse and therapist), with side rail, and draw sheet   Patient completed Rolling/Turning to Right with maximal assistance, 2 persons, with side rail, and draw sheet  Patient completed Scooting towards HOB in supine with total assistance, 2 persons, and draw sheet   Bed positioned in trendelenburg    Functional Mobility/Transfers:  Not assessed this date    Activities of Daily Living:  Upper Body Dressing: maximal assistance to doff soiled gown; don clean gown to thread through each UE and affix gown around back in supine  Total A x 2 persons (Nurse and therapist) to perform natalie-rectal hygiene 2/2 pt soiled from bowel movement      AMPAC 6 Click ADL: 13    Treatment & Education:  Pt performed bed mobility, functional mobility/transfers, and ADLs as documented above.   Pt educated and instructed on the following:  OT POC  Role of TAYLOR  Use of call bell for all assistance  Addressed questions and /or concerns within TAYLOR scope of practice  Pt verbalized understanding     Patient left supine HOB elevated with all lines intact, call button in reach, and nurse, Yue notified that pt's L AKA brace and ace bandage  was not donned/wrapped on L stump upon arrival.    GOALS:   Multidisciplinary Problems       Occupational Therapy Goals          Problem: Occupational  Therapy    Goal Priority Disciplines Outcome Interventions   Occupational Therapy Goal     OT, PT/OT Ongoing, Progressing    Description: Goals to be met by:04-11-24    Patient will increase functional independence with ADLs by performing:  Groom: set up A  Feeding: Set Up A  Rolling bilaterally Mod A with rail  Supine to sit: max A  Sit EOB with Max A                         Time Tracking:     OT Date of Treatment: 04/05/24  OT Start Time: 1412  OT Stop Time: 1435  OT Total Time (min): 23 min    Billable Minutes:Self Care/Home Management 13  Therapeutic Activity 10    OT/KATHLEEN: KATHLEEN     Number of KATHLEEN visits since last OT visit: 2    4/5/2024

## 2024-04-05 NOTE — PLAN OF CARE
Problem: Diabetes Comorbidity  Goal: Blood Glucose Level Within Targeted Range  Outcome: Ongoing, Progressing     Problem: Adjustment to Illness (Sepsis/Septic Shock)  Goal: Optimal Coping  Outcome: Ongoing, Progressing     Problem: Bleeding (Sepsis/Septic Shock)  Goal: Absence of Bleeding  Outcome: Ongoing, Progressing     Problem: Glycemic Control Impaired (Sepsis/Septic Shock)  Goal: Blood Glucose Level Within Desired Range  Outcome: Ongoing, Progressing     Problem: Infection Progression (Sepsis/Septic Shock)  Goal: Absence of Infection Signs and Symptoms  Outcome: Ongoing, Progressing     Problem: Infection  Goal: Absence of Infection Signs and Symptoms  Outcome: Ongoing, Progressing

## 2024-04-05 NOTE — PROGRESS NOTES
[facility] - Telemetry Stepdown  Endocrinology  Progress Note    Admit Date: 3/5/2024     Reason for Consult: Management of T2DM, Hyperglycemia     Diabetes diagnosis year: > 25 years    Home Diabetes Medications:  Novolog 6 units w/ meals and Lantus 45 units nightly (states he is not taking).     How often checking glucose at home?  Not really checking    BG readings on regimen: 200's  Hypoglycemia on the regimen?  Yes (When he was taking the Lantus)  Missed doses on regimen?  Yes    Diabetes Complications include:     Hyperglycemia, Hypoglycemia , Diabetic chronic kidney disease     , Diabetic dermatitis, Foot ulcer  , and Periodontal disease    Complicating diabetes co morbidities:   HTN; HLD      HPI: Saji Castañeda is a 73 yo M with a history of Afib, T2D, HLD, HTN, chronic osteomyelitis of L heel, s/p L TMA, PID, ESBL Klebsiella and Acinetobacter bacteremia who was admitted after being found down at home and DKA. Endocrine consulted to manage hyperglycemia and type 2 diabetes.   Hemoglobin A1C   Date Value Ref Range Status   03/05/2024 >14.0 (H) 4.0 - 5.6 % Final     Comment:     ADA Screening Guidelines:  5.7-6.4%  Consistent with prediabetes  >or=6.5%  Consistent with diabetes    High levels of fetal hemoglobin interfere with the HbA1C  assay. Heterozygous hemoglobin variants (HbS, HgC, etc)do  not significantly interfere with this assay.   However, presence of multiple variants may affect accuracy.     09/13/2023 11.0 (H) 4.5 - 5.7 % Final   03/10/2023 8.4 (H) 4.0 - 5.6 % Final     Comment:     ADA Screening Guidelines:  5.7-6.4%  Consistent with prediabetes  >or=6.5%  Consistent with diabetes    High levels of fetal hemoglobin interfere with the HbA1C  assay. Heterozygous hemoglobin variants (HbS, HgC, etc)do  not significantly interfere with this assay.   However, presence of multiple variants may affect accuracy.     09/17/2022 9.9 (H) 4.0 - 5.6 % Final     Comment:     ADA Screening Guidelines:  5.7-6.4%   "Consistent with prediabetes  >or=6.5%  Consistent with diabetes    High levels of fetal hemoglobin interfere with the HbA1C  assay. Heterozygous hemoglobin variants (HbS, HgC, etc)do  not significantly interfere with this assay.   However, presence of multiple variants may affect accuracy.              Interval HPI:   Overnight events: No acute events overnight. Patient in room 8078/8078 A. Blood glucose stable. BG at and below goal on current insulin regimen (SSI, prandial, and basal insulin ). Steroid use- None. 9 Days Post-Op  Renal function- Normal   Vasopressors-  None       Endocrine will continue to follow and manage insulin orders inpatient.         Diet diabetic  Calorie     Eatin%  Nausea: No  Hypoglycemia and intervention: No  Fever: No  TPN and/or TF: No      BP (!) 165/65 (Patient Position: Lying)   Pulse (!) 45   Temp 99.7 °F (37.6 °C) (Oral)   Resp 20   Ht 5' 9" (1.753 m)   Wt 119.7 kg (263 lb 14.3 oz)   SpO2 97%   BMI 38.97 kg/m²     Labs Reviewed and Include    No results for input(s): "GLU", "CALCIUM", "ALBUMIN", "PROT", "NA", "K", "CO2", "CL", "BUN", "CREATININE", "ALKPHOS", "ALT", "AST", "BILITOT" in the last 24 hours.  Lab Results   Component Value Date    WBC 8.90 2024    HGB 8.4 (L) 2024    HCT 27.6 (L) 2024    MCV 83 2024     (H) 2024     No results for input(s): "TSH", "FREET4" in the last 168 hours.  Lab Results   Component Value Date    HGBA1C >14.0 (H) 2024       Nutritional status:   Body mass index is 38.97 kg/m².  Lab Results   Component Value Date    ALBUMIN 1.2 (L) 2024    ALBUMIN 1.4 (L) 2024    ALBUMIN 1.4 (L) 2024     Lab Results   Component Value Date    PREALBUMIN 17 (L) 2014    PREALBUMIN 16 (L) 2014    PREALBUMIN 19 (L) 2014       Estimated Creatinine Clearance: 90.6 mL/min (based on SCr of 0.9 mg/dL).    Accu-Checks  Recent Labs     24  0818 24  0928 24  1140 " 04/03/24  1546 04/03/24  2002 04/04/24  0819 04/04/24  1218 04/04/24  1237 04/04/24  1635 04/04/24 2055   POCTGLUCOSE 81 90 121* 107 120* 100 153* 128* 150* 98       Current Medications and/or Treatments Impacting Glycemic Control  Immunotherapy:    Immunosuppressants       None          Steroids:   Hormones (From admission, onward)      Start     Stop Route Frequency Ordered    03/28/24 1128  melatonin tablet 6 mg         -- Oral Nightly PRN 03/28/24 1028          Pressors:    Autonomic Drugs (From admission, onward)      None          Hyperglycemia/Diabetes Medications:   Antihyperglycemics (From admission, onward)      Start     Stop Route Frequency Ordered    04/05/24 0900  insulin detemir U-100 (Levemir) pen 10 Units         -- SubQ 2 times daily 04/05/24 0828    03/31/24 1130  insulin aspart U-100 pen 6-8 Units         -- SubQ 3 times daily with meals 03/31/24 0845    03/29/24 2346  insulin aspart U-100 pen 0-10 Units         -- SubQ Before meals, nightly and at 0200 PRN 03/29/24 2247            ASSESSMENT and PLAN    ID  * Osteomyelitis of left lower extremity  Managed per primary team  Optimize BG control to improve wound healing        Endocrine  Diabetic ketoacidosis without coma associated with type 2 diabetes mellitus  Resolved.         Insulin dependent type 2 diabetes mellitus  BG goal: 140-180    - Levemir (Insulin Detemir) 10 units BID (20% reduction due to fasting blood glucose below goal.)  - Novolog (Insulin Aspart) 6-8 units with meals (Administer    6    units if patient eats 25-50% of meal, administer    8    units if patient eats > 50% of meal.) and prn for BG excursions [facility] [place] (150/25)  - BG checks AC/HS/0200   - Hypoglycemia protocol in place     ** Please notify Endocrine for any change and/or advance in diet**  ** Please call Endocrine for any BG related issues **     Discharge Planning:   TBD. Please notify endocrinology prior to discharge.         Chaka Dominguez, OSCAR,  SKYLARP  Endocrinology  [facility] - Telemetry Stepdown

## 2024-04-05 NOTE — NURSING
Trice at [facility] answered on primary nurse attempt to provide report. Trice stated not the appropriate staff and took my return call back information.   Unit RN to OR RN

## 2024-04-05 NOTE — PLAN OF CARE
[facility] - Telemetry Stepdown  Discharge Final Note    Primary Care Provider: [facility] -    Expected Discharge Date: 4/5/2024    Final Discharge Note (most recent)       Final Note - 04/05/24 1518          Final Note    Assessment Type Final Discharge Note (P)      Anticipated Discharge Disposition Skilled Nursing Facility (P)      What phone number can be called within the next 1-3 days to see how you are doing after discharge? [phone] (P)      Hospital Resources/Appts/Education Provided Provided patient/caregiver with written discharge plan information;Appointments scheduled and added to AVS (P)         Post-Acute Status    Post-Acute Authorization Placement (P)      Post-Acute Placement Status Set-up Complete/Auth obtained (P)      Patient choice form signed by patient/caregiver List from System Post-Acute Care (P)      Discharge Delays Ambulance Transport/Facility Transport (P)                      Important Message from Medicare  Important Message from Medicare regarding Discharge Appeal Rights: Given to patient/caregiver, Explained to patient/caregiver, Signed/date by patient/caregiver     Date IMM was signed: 04/04/24  Time IMM was signed: 1103    Patient is discharging to [facility]. Pt's hospital follow up appointments are scheduled and in the Pt's AVS.     Pt's transportation has been scheduled for  3:30 PM, RN and family notified.    RN to call report to [phone], room Rm 8B    Pt has no other post acute needs and is cleared for discharge from a case management standpoint.     VANESSA Lewis, LMSW [facility]  [id number]

## 2024-04-06 LAB
BACTERIA BLD CULT: NORMAL
BACTERIA BLD CULT: NORMAL

## 2024-04-09 ENCOUNTER — OUTSIDE PLACE OF SERVICE (OUTPATIENT)
Dept: ADMINISTRATIVE | Facility: OTHER | Age: 74
End: 2024-04-09
Payer: MEDICARE

## 2024-04-09 PROCEDURE — 99305 1ST NF CARE MODERATE MDM 35: CPT | Mod: ,,, | Performed by: FAMILY MEDICINE

## 2024-04-11 ENCOUNTER — TELEPHONE (OUTPATIENT)
Dept: INFECTIOUS DISEASES | Facility: CLINIC | Age: 74
End: 2024-04-11
Payer: MEDICARE

## 2024-04-11 NOTE — TELEPHONE ENCOUNTER
Spoke to patient he said that he doesn't want to schedule his f/u appt right now because he's in rehab due to his leg being cut off.     Patient said that he will give us a call back when he's ready to schedule.

## 2024-04-12 LAB
FUNGUS SPEC CULT: ABNORMAL
FUNGUS SPEC CULT: ABNORMAL

## 2024-04-17 ENCOUNTER — OFFICE VISIT (OUTPATIENT)
Dept: VASCULAR SURGERY | Facility: CLINIC | Age: 74
End: 2024-04-17
Payer: MEDICARE

## 2024-04-17 VITALS
HEART RATE: 86 BPM | WEIGHT: 263.88 LBS | DIASTOLIC BLOOD PRESSURE: 76 MMHG | SYSTOLIC BLOOD PRESSURE: 145 MMHG | BODY MASS INDEX: 39.08 KG/M2 | HEIGHT: 69 IN

## 2024-04-17 DIAGNOSIS — I70.223 CRITICAL LIMB ISCHEMIA OF BOTH LOWER EXTREMITIES: Primary | ICD-10-CM

## 2024-04-17 PROCEDURE — 3077F SYST BP >= 140 MM HG: CPT | Mod: CPTII,S$GLB,, | Performed by: SURGERY

## 2024-04-17 PROCEDURE — 99999 PR PBB SHADOW E&M-EST. PATIENT-LVL IV: CPT | Mod: PBBFAC,,, | Performed by: SURGERY

## 2024-04-17 PROCEDURE — 1101F PT FALLS ASSESS-DOCD LE1/YR: CPT | Mod: CPTII,S$GLB,, | Performed by: SURGERY

## 2024-04-17 PROCEDURE — 1126F AMNT PAIN NOTED NONE PRSNT: CPT | Mod: CPTII,S$GLB,, | Performed by: SURGERY

## 2024-04-17 PROCEDURE — 3046F HEMOGLOBIN A1C LEVEL >9.0%: CPT | Mod: CPTII,S$GLB,, | Performed by: SURGERY

## 2024-04-17 PROCEDURE — 3288F FALL RISK ASSESSMENT DOCD: CPT | Mod: CPTII,S$GLB,, | Performed by: SURGERY

## 2024-04-17 PROCEDURE — 3078F DIAST BP <80 MM HG: CPT | Mod: CPTII,S$GLB,, | Performed by: SURGERY

## 2024-04-17 PROCEDURE — 1111F DSCHRG MED/CURRENT MED MERGE: CPT | Mod: CPTII,S$GLB,, | Performed by: SURGERY

## 2024-04-17 PROCEDURE — 1159F MED LIST DOCD IN RCRD: CPT | Mod: CPTII,S$GLB,, | Performed by: SURGERY

## 2024-04-17 PROCEDURE — 99024 POSTOP FOLLOW-UP VISIT: CPT | Mod: S$GLB,,, | Performed by: SURGERY

## 2024-04-17 NOTE — PROGRESS NOTES
Left AKA well healed.  Picture below.  Staples removed.   Patient reports  ongoing phantom pain.   I am increasing his gabapentin to 300 t.I.d.     Of note patient has a wound of his right foot, which is managed by wound care.  Patient reports that the wound is healing.  I reviewed his previous vascular studies  which suggest minimal vascular disease.  Additionally in talking with the patient he has been unable to use his right leg  for transfers.      Follow-up with vascular p.rGissellen.

## 2024-04-24 LAB
ACID FAST MOD KINY STN SPEC: NORMAL
MYCOBACTERIUM SPEC QL CULT: NORMAL

## 2024-04-25 ENCOUNTER — TELEPHONE (OUTPATIENT)
Dept: FAMILY MEDICINE | Facility: CLINIC | Age: 74
End: 2024-04-25
Payer: MEDICARE

## 2024-04-25 RX ORDER — HYDROCODONE BITARTRATE AND ACETAMINOPHEN 5; 325 MG/1; MG/1
1 TABLET ORAL EVERY MORNING
Qty: 20 TABLET | Refills: 0 | Status: ON HOLD | OUTPATIENT
Start: 2024-04-25 | End: 2024-05-13

## 2024-05-08 ENCOUNTER — HOSPITAL ENCOUNTER (INPATIENT)
Facility: HOSPITAL | Age: 74
LOS: 4 days | Discharge: HOME | DRG: 698 | End: 2024-05-13
Attending: EMERGENCY MEDICINE | Admitting: INTERNAL MEDICINE
Payer: MEDICARE

## 2024-05-08 DIAGNOSIS — I73.9 PERIPHERAL ARTERIAL DISEASE: ICD-10-CM

## 2024-05-08 DIAGNOSIS — E11.69 TYPE 2 DIABETES MELLITUS WITH OTHER SPECIFIED COMPLICATION, WITH LONG-TERM CURRENT USE OF INSULIN: ICD-10-CM

## 2024-05-08 DIAGNOSIS — L92.9 HYPERGRANULATION: ICD-10-CM

## 2024-05-08 DIAGNOSIS — D63.8 ANEMIA OF CHRONIC DISEASE: ICD-10-CM

## 2024-05-08 DIAGNOSIS — A41.9 SEPSIS, DUE TO UNSPECIFIED ORGANISM, UNSPECIFIED WHETHER ACUTE ORGAN DYSFUNCTION PRESENT: Primary | ICD-10-CM

## 2024-05-08 DIAGNOSIS — R33.9 URINARY RETENTION: ICD-10-CM

## 2024-05-08 DIAGNOSIS — D50.9 IRON DEFICIENCY ANEMIA, UNSPECIFIED IRON DEFICIENCY ANEMIA TYPE: ICD-10-CM

## 2024-05-08 DIAGNOSIS — D72.829 ELEVATED WBC COUNT: ICD-10-CM

## 2024-05-08 DIAGNOSIS — Z79.4 TYPE 2 DIABETES MELLITUS WITH OTHER SPECIFIED COMPLICATION, WITH LONG-TERM CURRENT USE OF INSULIN: ICD-10-CM

## 2024-05-08 DIAGNOSIS — D72.829 LEUKOCYTOSIS, UNSPECIFIED TYPE: ICD-10-CM

## 2024-05-08 LAB
ALBUMIN SERPL BCP-MCNC: 1.8 G/DL (ref 3.5–5.2)
ALP SERPL-CCNC: 78 U/L (ref 55–135)
ALT SERPL W/O P-5'-P-CCNC: 23 U/L (ref 10–44)
ANION GAP SERPL CALC-SCNC: 12 MMOL/L (ref 8–16)
AST SERPL-CCNC: 24 U/L (ref 10–40)
BACTERIA #/AREA URNS HPF: ABNORMAL /HPF
BASOPHILS # BLD AUTO: 0.04 K/UL (ref 0–0.2)
BASOPHILS NFR BLD: 0.3 % (ref 0–1.9)
BILIRUB SERPL-MCNC: 0.3 MG/DL (ref 0.1–1)
BILIRUB UR QL STRIP: NEGATIVE
BUN SERPL-MCNC: 13 MG/DL (ref 8–23)
CALCIUM SERPL-MCNC: 8.9 MG/DL (ref 8.7–10.5)
CHLORIDE SERPL-SCNC: 101 MMOL/L (ref 95–110)
CLARITY UR: ABNORMAL
CO2 SERPL-SCNC: 28 MMOL/L (ref 23–29)
COLOR UR: YELLOW
CREAT SERPL-MCNC: 1 MG/DL (ref 0.5–1.4)
DIFFERENTIAL METHOD BLD: ABNORMAL
EOSINOPHIL # BLD AUTO: 0.4 K/UL (ref 0–0.5)
EOSINOPHIL NFR BLD: 2.7 % (ref 0–8)
ERYTHROCYTE [DISTWIDTH] IN BLOOD BY AUTOMATED COUNT: 17.6 % (ref 11.5–14.5)
EST. GFR  (NO RACE VARIABLE): >60 ML/MIN/1.73 M^2
GLUCOSE SERPL-MCNC: 131 MG/DL (ref 70–110)
GLUCOSE UR QL STRIP: NEGATIVE
HCT VFR BLD AUTO: 24.8 % (ref 40–54)
HGB BLD-MCNC: 7.5 G/DL (ref 14–18)
HGB UR QL STRIP: ABNORMAL
HYALINE CASTS #/AREA URNS LPF: 0 /LPF
IMM GRANULOCYTES # BLD AUTO: 0.07 K/UL (ref 0–0.04)
IMM GRANULOCYTES NFR BLD AUTO: 0.5 % (ref 0–0.5)
KETONES UR QL STRIP: NEGATIVE
LACTATE SERPL-SCNC: 1.6 MMOL/L (ref 0.5–2.2)
LEUKOCYTE ESTERASE UR QL STRIP: ABNORMAL
LYMPHOCYTES # BLD AUTO: 1.6 K/UL (ref 1–4.8)
LYMPHOCYTES NFR BLD: 11.2 % (ref 18–48)
MAGNESIUM SERPL-MCNC: 2 MG/DL (ref 1.6–2.6)
MCH RBC QN AUTO: 24.2 PG (ref 27–31)
MCHC RBC AUTO-ENTMCNC: 30.2 G/DL (ref 32–36)
MCV RBC AUTO: 80 FL (ref 82–98)
MICROSCOPIC COMMENT: ABNORMAL
MONOCYTES # BLD AUTO: 0.8 K/UL (ref 0.3–1)
MONOCYTES NFR BLD: 5.4 % (ref 4–15)
NEUTROPHILS # BLD AUTO: 11.4 K/UL (ref 1.8–7.7)
NEUTROPHILS NFR BLD: 79.9 % (ref 38–73)
NITRITE UR QL STRIP: NEGATIVE
NRBC BLD-RTO: 0 /100 WBC
PH UR STRIP: 7 [PH] (ref 5–8)
PHOSPHATE SERPL-MCNC: 3 MG/DL (ref 2.7–4.5)
PLATELET # BLD AUTO: 548 K/UL (ref 150–450)
PMV BLD AUTO: 8.7 FL (ref 9.2–12.9)
POTASSIUM SERPL-SCNC: 3.8 MMOL/L (ref 3.5–5.1)
PROT SERPL-MCNC: 7 G/DL (ref 6–8.4)
PROT UR QL STRIP: ABNORMAL
RBC # BLD AUTO: 3.1 M/UL (ref 4.6–6.2)
RBC #/AREA URNS HPF: 37 /HPF (ref 0–4)
SODIUM SERPL-SCNC: 141 MMOL/L (ref 136–145)
SP GR UR STRIP: 1.02 (ref 1–1.03)
URN SPEC COLLECT METH UR: ABNORMAL
UROBILINOGEN UR STRIP-ACNC: ABNORMAL EU/DL
WBC # BLD AUTO: 14.24 K/UL (ref 3.9–12.7)
WBC #/AREA URNS HPF: >100 /HPF (ref 0–5)
WBC CLUMPS URNS QL MICRO: ABNORMAL

## 2024-05-08 PROCEDURE — 81000 URINALYSIS NONAUTO W/SCOPE: CPT | Performed by: EMERGENCY MEDICINE

## 2024-05-08 PROCEDURE — 93010 ELECTROCARDIOGRAM REPORT: CPT | Mod: ,,, | Performed by: INTERNAL MEDICINE

## 2024-05-08 PROCEDURE — 84100 ASSAY OF PHOSPHORUS: CPT | Performed by: EMERGENCY MEDICINE

## 2024-05-08 PROCEDURE — 80053 COMPREHEN METABOLIC PANEL: CPT | Performed by: EMERGENCY MEDICINE

## 2024-05-08 PROCEDURE — 25000003 PHARM REV CODE 250: Performed by: EMERGENCY MEDICINE

## 2024-05-08 PROCEDURE — 87077 CULTURE AEROBIC IDENTIFY: CPT | Performed by: EMERGENCY MEDICINE

## 2024-05-08 PROCEDURE — 83735 ASSAY OF MAGNESIUM: CPT | Performed by: EMERGENCY MEDICINE

## 2024-05-08 PROCEDURE — 87040 BLOOD CULTURE FOR BACTERIA: CPT | Mod: 59 | Performed by: EMERGENCY MEDICINE

## 2024-05-08 PROCEDURE — 63600175 PHARM REV CODE 636 W HCPCS: Performed by: EMERGENCY MEDICINE

## 2024-05-08 PROCEDURE — 85025 COMPLETE CBC W/AUTO DIFF WBC: CPT | Performed by: EMERGENCY MEDICINE

## 2024-05-08 PROCEDURE — 87088 URINE BACTERIA CULTURE: CPT | Performed by: EMERGENCY MEDICINE

## 2024-05-08 PROCEDURE — 96361 HYDRATE IV INFUSION ADD-ON: CPT

## 2024-05-08 PROCEDURE — 83605 ASSAY OF LACTIC ACID: CPT | Performed by: EMERGENCY MEDICINE

## 2024-05-08 PROCEDURE — 87086 URINE CULTURE/COLONY COUNT: CPT | Performed by: EMERGENCY MEDICINE

## 2024-05-08 PROCEDURE — 87186 SC STD MICRODIL/AGAR DIL: CPT | Performed by: EMERGENCY MEDICINE

## 2024-05-08 PROCEDURE — 93005 ELECTROCARDIOGRAM TRACING: CPT

## 2024-05-08 PROCEDURE — 96365 THER/PROPH/DIAG IV INF INIT: CPT

## 2024-05-08 PROCEDURE — 99285 EMERGENCY DEPT VISIT HI MDM: CPT | Mod: 25

## 2024-05-08 RX ADMIN — SODIUM CHLORIDE 1914 ML: 9 INJECTION, SOLUTION INTRAVENOUS at 10:05

## 2024-05-08 RX ADMIN — CEFTRIAXONE SODIUM 2 G: 2 INJECTION, POWDER, FOR SOLUTION INTRAMUSCULAR; INTRAVENOUS at 10:05

## 2024-05-08 NOTE — Clinical Note
Diagnosis: Sepsis, due to unspecified organism, unspecified whether acute organ dysfunction present [[id number]]   Future Attending Provider: GENEVIEVE BAILEY [[id number]]   Reason for IP Medical Treatment  (Clinical interventions that can only be accomplished in the IP setting? ) :: sepsis   I certify that Inpatient services for greater than or equal to 2 midnights are medically necessary:: Yes   Plans for Post-Acute care--if anticipated (pick the single best option):: A. No post acute care anticipated at this time

## 2024-05-09 LAB
ALBUMIN SERPL BCP-MCNC: 1.8 G/DL (ref 3.5–5.2)
ALP SERPL-CCNC: 83 U/L (ref 55–135)
ALT SERPL W/O P-5'-P-CCNC: 24 U/L (ref 10–44)
ANION GAP SERPL CALC-SCNC: 12 MMOL/L (ref 8–16)
AST SERPL-CCNC: 28 U/L (ref 10–40)
BASOPHILS # BLD AUTO: 0.05 K/UL (ref 0–0.2)
BASOPHILS NFR BLD: 0.3 % (ref 0–1.9)
BILIRUB SERPL-MCNC: 0.2 MG/DL (ref 0.1–1)
BUN SERPL-MCNC: 11 MG/DL (ref 8–23)
CALCIUM SERPL-MCNC: 8.7 MG/DL (ref 8.7–10.5)
CHLORIDE SERPL-SCNC: 102 MMOL/L (ref 95–110)
CO2 SERPL-SCNC: 28 MMOL/L (ref 23–29)
CREAT SERPL-MCNC: 0.8 MG/DL (ref 0.5–1.4)
DIFFERENTIAL METHOD BLD: ABNORMAL
EOSINOPHIL # BLD AUTO: 0.3 K/UL (ref 0–0.5)
EOSINOPHIL NFR BLD: 2 % (ref 0–8)
ERYTHROCYTE [DISTWIDTH] IN BLOOD BY AUTOMATED COUNT: 17.7 % (ref 11.5–14.5)
EST. GFR  (NO RACE VARIABLE): >60 ML/MIN/1.73 M^2
GLUCOSE SERPL-MCNC: 162 MG/DL (ref 70–110)
HCT VFR BLD AUTO: 26.8 % (ref 40–54)
HGB BLD-MCNC: 7.9 G/DL (ref 14–18)
IMM GRANULOCYTES # BLD AUTO: 0.05 K/UL (ref 0–0.04)
IMM GRANULOCYTES NFR BLD AUTO: 0.3 % (ref 0–0.5)
LACTATE SERPL-SCNC: 1.5 MMOL/L (ref 0.5–2.2)
LYMPHOCYTES # BLD AUTO: 1.2 K/UL (ref 1–4.8)
LYMPHOCYTES NFR BLD: 7.8 % (ref 18–48)
MCH RBC QN AUTO: 23.9 PG (ref 27–31)
MCHC RBC AUTO-ENTMCNC: 29.5 G/DL (ref 32–36)
MCV RBC AUTO: 81 FL (ref 82–98)
MONOCYTES # BLD AUTO: 0.9 K/UL (ref 0.3–1)
MONOCYTES NFR BLD: 5.7 % (ref 4–15)
NEUTROPHILS # BLD AUTO: 13.3 K/UL (ref 1.8–7.7)
NEUTROPHILS NFR BLD: 83.9 % (ref 38–73)
NRBC BLD-RTO: 0 /100 WBC
PLATELET # BLD AUTO: 582 K/UL (ref 150–450)
PMV BLD AUTO: 9 FL (ref 9.2–12.9)
POCT GLUCOSE: 148 MG/DL (ref 70–110)
POCT GLUCOSE: 196 MG/DL (ref 70–110)
POCT GLUCOSE: 222 MG/DL (ref 70–110)
POCT GLUCOSE: 237 MG/DL (ref 70–110)
POTASSIUM SERPL-SCNC: 3.8 MMOL/L (ref 3.5–5.1)
PROT SERPL-MCNC: 7.1 G/DL (ref 6–8.4)
RBC # BLD AUTO: 3.31 M/UL (ref 4.6–6.2)
SODIUM SERPL-SCNC: 142 MMOL/L (ref 136–145)
VANCOMYCIN SERPL-MCNC: 17.6 UG/ML
WBC # BLD AUTO: 15.84 K/UL (ref 3.9–12.7)

## 2024-05-09 PROCEDURE — 25000003 PHARM REV CODE 250: Performed by: INTERNAL MEDICINE

## 2024-05-09 PROCEDURE — 83605 ASSAY OF LACTIC ACID: CPT | Performed by: EMERGENCY MEDICINE

## 2024-05-09 PROCEDURE — 25000003 PHARM REV CODE 250: Performed by: STUDENT IN AN ORGANIZED HEALTH CARE EDUCATION/TRAINING PROGRAM

## 2024-05-09 PROCEDURE — 80202 ASSAY OF VANCOMYCIN: CPT | Performed by: INTERNAL MEDICINE

## 2024-05-09 PROCEDURE — 21400001 HC TELEMETRY ROOM

## 2024-05-09 PROCEDURE — 36415 COLL VENOUS BLD VENIPUNCTURE: CPT | Performed by: INTERNAL MEDICINE

## 2024-05-09 PROCEDURE — 63600175 PHARM REV CODE 636 W HCPCS: Performed by: INTERNAL MEDICINE

## 2024-05-09 PROCEDURE — 80053 COMPREHEN METABOLIC PANEL: CPT | Performed by: STUDENT IN AN ORGANIZED HEALTH CARE EDUCATION/TRAINING PROGRAM

## 2024-05-09 PROCEDURE — 25500020 PHARM REV CODE 255: Performed by: INTERNAL MEDICINE

## 2024-05-09 PROCEDURE — 85025 COMPLETE CBC W/AUTO DIFF WBC: CPT | Performed by: STUDENT IN AN ORGANIZED HEALTH CARE EDUCATION/TRAINING PROGRAM

## 2024-05-09 PROCEDURE — 63600175 PHARM REV CODE 636 W HCPCS: Performed by: STUDENT IN AN ORGANIZED HEALTH CARE EDUCATION/TRAINING PROGRAM

## 2024-05-09 PROCEDURE — 99222 1ST HOSP IP/OBS MODERATE 55: CPT | Mod: GC,,, | Performed by: UROLOGY

## 2024-05-09 RX ORDER — IBUPROFEN 200 MG
24 TABLET ORAL
Status: DISCONTINUED | OUTPATIENT
Start: 2024-05-09 | End: 2024-05-13 | Stop reason: HOSPADM

## 2024-05-09 RX ORDER — SODIUM CHLORIDE 0.9 % (FLUSH) 0.9 %
10 SYRINGE (ML) INJECTION EVERY 12 HOURS PRN
Status: DISCONTINUED | OUTPATIENT
Start: 2024-05-09 | End: 2024-05-13 | Stop reason: HOSPADM

## 2024-05-09 RX ORDER — INSULIN GLARGINE 100 [IU]/ML
12 INJECTION, SOLUTION SUBCUTANEOUS 2 TIMES DAILY
Status: DISCONTINUED | OUTPATIENT
Start: 2024-05-09 | End: 2024-05-09

## 2024-05-09 RX ORDER — INSULIN ASPART 100 [IU]/ML
0-5 INJECTION, SOLUTION INTRAVENOUS; SUBCUTANEOUS
Status: DISCONTINUED | OUTPATIENT
Start: 2024-05-09 | End: 2024-05-13 | Stop reason: HOSPADM

## 2024-05-09 RX ORDER — NALOXONE HCL 0.4 MG/ML
0.4 VIAL (ML) INJECTION
Status: DISCONTINUED | OUTPATIENT
Start: 2024-05-09 | End: 2024-05-13 | Stop reason: HOSPADM

## 2024-05-09 RX ORDER — TAMSULOSIN HYDROCHLORIDE 0.4 MG/1
0.4 CAPSULE ORAL DAILY
Status: DISCONTINUED | OUTPATIENT
Start: 2024-05-09 | End: 2024-05-13 | Stop reason: HOSPADM

## 2024-05-09 RX ORDER — ENOXAPARIN SODIUM 100 MG/ML
40 INJECTION SUBCUTANEOUS EVERY 24 HOURS
Status: DISCONTINUED | OUTPATIENT
Start: 2024-05-09 | End: 2024-05-09

## 2024-05-09 RX ORDER — ASCORBIC ACID 500 MG
500 TABLET ORAL 2 TIMES DAILY
Status: DISCONTINUED | OUTPATIENT
Start: 2024-05-09 | End: 2024-05-13 | Stop reason: HOSPADM

## 2024-05-09 RX ORDER — GABAPENTIN 300 MG/1
300 CAPSULE ORAL 3 TIMES DAILY
Status: DISCONTINUED | OUTPATIENT
Start: 2024-05-09 | End: 2024-05-13 | Stop reason: HOSPADM

## 2024-05-09 RX ORDER — GLUCAGON 1 MG
1 KIT INJECTION
Status: DISCONTINUED | OUTPATIENT
Start: 2024-05-09 | End: 2024-05-13 | Stop reason: HOSPADM

## 2024-05-09 RX ORDER — IBUPROFEN 200 MG
16 TABLET ORAL
Status: DISCONTINUED | OUTPATIENT
Start: 2024-05-09 | End: 2024-05-13 | Stop reason: HOSPADM

## 2024-05-09 RX ORDER — ACETAMINOPHEN 325 MG/1
650 TABLET ORAL EVERY 6 HOURS PRN
Status: DISCONTINUED | OUTPATIENT
Start: 2024-05-09 | End: 2024-05-10

## 2024-05-09 RX ORDER — ATORVASTATIN CALCIUM 40 MG/1
80 TABLET, FILM COATED ORAL DAILY
Status: DISCONTINUED | OUTPATIENT
Start: 2024-05-09 | End: 2024-05-13 | Stop reason: HOSPADM

## 2024-05-09 RX ORDER — OXYCODONE AND ACETAMINOPHEN 5; 325 MG/1; MG/1
1 TABLET ORAL EVERY 8 HOURS PRN
Status: DISCONTINUED | OUTPATIENT
Start: 2024-05-09 | End: 2024-05-13 | Stop reason: HOSPADM

## 2024-05-09 RX ORDER — MUPIROCIN 20 MG/G
OINTMENT TOPICAL 2 TIMES DAILY
Status: DISCONTINUED | OUTPATIENT
Start: 2024-05-09 | End: 2024-05-13 | Stop reason: HOSPADM

## 2024-05-09 RX ORDER — PANTOPRAZOLE SODIUM 40 MG/1
40 TABLET, DELAYED RELEASE ORAL DAILY
Status: DISCONTINUED | OUTPATIENT
Start: 2024-05-09 | End: 2024-05-13 | Stop reason: HOSPADM

## 2024-05-09 RX ORDER — FUROSEMIDE 20 MG/1
20 TABLET ORAL DAILY
Status: DISCONTINUED | OUTPATIENT
Start: 2024-05-09 | End: 2024-05-13 | Stop reason: HOSPADM

## 2024-05-09 RX ORDER — INSULIN ASPART 100 [IU]/ML
5 INJECTION, SOLUTION INTRAVENOUS; SUBCUTANEOUS
Status: DISCONTINUED | OUTPATIENT
Start: 2024-05-09 | End: 2024-05-13 | Stop reason: HOSPADM

## 2024-05-09 RX ORDER — ISOSORBIDE DINITRATE 10 MG/1
10 TABLET ORAL 3 TIMES DAILY
Status: DISCONTINUED | OUTPATIENT
Start: 2024-05-09 | End: 2024-05-13 | Stop reason: HOSPADM

## 2024-05-09 RX ADMIN — MUPIROCIN: 20 OINTMENT TOPICAL at 09:05

## 2024-05-09 RX ADMIN — ACETAMINOPHEN 650 MG: 325 TABLET ORAL at 12:05

## 2024-05-09 RX ADMIN — TAMSULOSIN HYDROCHLORIDE 0.4 MG: 0.4 CAPSULE ORAL at 09:05

## 2024-05-09 RX ADMIN — GABAPENTIN 300 MG: 300 CAPSULE ORAL at 08:05

## 2024-05-09 RX ADMIN — CEFEPIME 1 G: 1 INJECTION, POWDER, FOR SOLUTION INTRAMUSCULAR; INTRAVENOUS at 08:05

## 2024-05-09 RX ADMIN — MUPIROCIN: 20 OINTMENT TOPICAL at 08:05

## 2024-05-09 RX ADMIN — OXYCODONE HYDROCHLORIDE AND ACETAMINOPHEN 500 MG: 500 TABLET ORAL at 08:05

## 2024-05-09 RX ADMIN — ATORVASTATIN CALCIUM 80 MG: 40 TABLET, FILM COATED ORAL at 09:05

## 2024-05-09 RX ADMIN — FUROSEMIDE 20 MG: 20 TABLET ORAL at 09:05

## 2024-05-09 RX ADMIN — INSULIN DETEMIR 9 UNITS: 100 INJECTION, SOLUTION SUBCUTANEOUS at 08:05

## 2024-05-09 RX ADMIN — INSULIN DETEMIR 9 UNITS: 100 INJECTION, SOLUTION SUBCUTANEOUS at 09:05

## 2024-05-09 RX ADMIN — ISOSORBIDE DINITRATE 10 MG: 10 TABLET ORAL at 02:05

## 2024-05-09 RX ADMIN — ISOSORBIDE DINITRATE 10 MG: 10 TABLET ORAL at 08:05

## 2024-05-09 RX ADMIN — GABAPENTIN 300 MG: 300 CAPSULE ORAL at 09:05

## 2024-05-09 RX ADMIN — INSULIN ASPART 2 UNITS: 100 INJECTION, SOLUTION INTRAVENOUS; SUBCUTANEOUS at 04:05

## 2024-05-09 RX ADMIN — INSULIN ASPART 5 UNITS: 100 INJECTION, SOLUTION INTRAVENOUS; SUBCUTANEOUS at 12:05

## 2024-05-09 RX ADMIN — OXYCODONE HYDROCHLORIDE AND ACETAMINOPHEN 1 TABLET: 5; 325 TABLET ORAL at 01:05

## 2024-05-09 RX ADMIN — INSULIN ASPART 5 UNITS: 100 INJECTION, SOLUTION INTRAVENOUS; SUBCUTANEOUS at 04:05

## 2024-05-09 RX ADMIN — INSULIN ASPART 5 UNITS: 100 INJECTION, SOLUTION INTRAVENOUS; SUBCUTANEOUS at 09:05

## 2024-05-09 RX ADMIN — APIXABAN 5 MG: 5 TABLET, FILM COATED ORAL at 09:05

## 2024-05-09 RX ADMIN — CEFEPIME 1 G: 1 INJECTION, POWDER, FOR SOLUTION INTRAMUSCULAR; INTRAVENOUS at 03:05

## 2024-05-09 RX ADMIN — VANCOMYCIN HYDROCHLORIDE 1250 MG: 1.25 INJECTION, POWDER, LYOPHILIZED, FOR SOLUTION INTRAVENOUS at 06:05

## 2024-05-09 RX ADMIN — ISOSORBIDE DINITRATE 10 MG: 10 TABLET ORAL at 09:05

## 2024-05-09 RX ADMIN — THERA TABS 1 TABLET: TAB at 09:05

## 2024-05-09 RX ADMIN — NIFEDIPINE 90 MG: 60 TABLET, FILM COATED, EXTENDED RELEASE ORAL at 09:05

## 2024-05-09 RX ADMIN — VANCOMYCIN HYDROCHLORIDE 2250 MG: 1.25 INJECTION, POWDER, LYOPHILIZED, FOR SOLUTION INTRAVENOUS at 05:05

## 2024-05-09 RX ADMIN — APIXABAN 5 MG: 5 TABLET, FILM COATED ORAL at 08:05

## 2024-05-09 RX ADMIN — IOHEXOL 100 ML: 350 INJECTION, SOLUTION INTRAVENOUS at 12:05

## 2024-05-09 RX ADMIN — GABAPENTIN 300 MG: 300 CAPSULE ORAL at 02:05

## 2024-05-09 RX ADMIN — PANTOPRAZOLE SODIUM 40 MG: 40 TABLET, DELAYED RELEASE ORAL at 05:05

## 2024-05-09 RX ADMIN — OXYCODONE HYDROCHLORIDE AND ACETAMINOPHEN 500 MG: 500 TABLET ORAL at 09:05

## 2024-05-09 RX ADMIN — CEFEPIME 1 G: 1 INJECTION, POWDER, FOR SOLUTION INTRAMUSCULAR; INTRAVENOUS at 11:05

## 2024-05-09 NOTE — ASSESSMENT & PLAN NOTE
Saji Castañeda is a 73 yo M with a history of AF, DM, HLD, HLD, osteomyelitis presently admitted for leukocytosis. Urology was consulted for retention.  - will arrange outpatient follow up for voiding trial and discussion of management of ongoing urinary retention, likely SPT vs indwelling fung vs CIC  - patient not likely a candidate for outlet procedure given most recent A1C

## 2024-05-09 NOTE — H&P
[facility] Medicine H&P Note     Admitting Team: [facility] Hospitalist Team B  Attending Physician: Vinod Elise MD  Resident: Melony  Intern: Iwona     Date of Admit: 5/8/2024    Chief Complaint     Elevated white blood cell count    Subjective:      History of Present Illness:  Saji Castañeda is a 74 y.o. -American male who has a past medical history of DMII, HLD, HTN, Left AKA, PAD, Microcytic Anemia, Pacemaker for 3rd degree heart block. The patient presented to [facility] on 5/8/2024 with a primary complaint of elevated white blood cell count and cloudy output from indwelling Sams catheter site.    The patient has a recent hospitalization from  late March to early April at [facility] for which he was hospitalized for DKA but found to have acute on chronic osteomyelitis in lower extremity in which he underwent left above the knee amputation.  He was additionally found to have a right upper extremity DVT and started on Eliquis, additionally required Sams placement after Urologic evaluation for difficulty voiding, was treated for hospital acquired pneumonia with course of Vancomycin and Cefepime.  He has had Sams catheter in place since.    Patient presented to ED via EMS on 5/8/2024 after Sams catheter was found to have cloudy output and patient additionally had leukocytosis.  Patient is alert and oriented and denies any complaints, denying suprapubic pain, flank pain, subjective fever, chills, shortness of breath, cough, chest pain, photophobia, headache, abdominal pain, nausea, vomiting, or diarrhea.    Past Medical History:  DMII  HLD   HTN,  Left AKA from chronic osteomyelitis  PAD  Microcytic Anemia  Pacemaker for 3rd degree heart block  Atrial Fibrillation    Past Surgical History:  Past Surgical History:   Procedure Laterality Date    ABOVE-KNEE AMPUTATION Left 3/27/2024    Procedure: AMPUTATION, ABOVE KNEE;  Surgeon: Adal Arellano MD;  [facility];  Service: Vascular;  Laterality: Left;    ANGIOGRAPHY OF LOWER EXTREMITY N/A 2/3/2021    Procedure: Angiogram Extremity Bilateral;  Surgeon: Ernst Chacko MD;  [facility];  Service: Peripheral Vascular;  Laterality: N/A;  7.4 mintues fluoroscopy time  816.15 mGy  170.17 Gycm2    AORTOGRAPHY WITH EXTREMITY RUNOFF Bilateral 2/3/2021    Procedure: AORTOGRAM, WITH EXTREMITY RUNOFF;  Surgeon: Ernst Chacko MD;  [facility];  Service: Peripheral Vascular;  Laterality: Bilateral;    DEBRIDEMENT OF FOOT Left 2/23/2021    Procedure: DEBRIDEMENT, LEFT HEEL;  Surgeon: Mayra Schroeder DPM;  [facility];  Service: Podiatry;  Laterality: Left;    FOOT AMPUTATION  October 2010    left high midfoot amputation    IMPLANTATION OF LEADLESS PACEMAKER N/A 10/12/2023    Procedure: [id number];  Surgeon: VANESSA Delatorre MD;  Location: [facility] EP LAB;  Service: Cardiology;  Laterality: N/A;  AVB, MICRA, EH, [place]ES, [id number]       Allergies:  Review of patient's allergies indicates:  No Known Allergies    Home Medications:  Prior to Admission medications    Medication Sig Start Date End Date Taking? Authorizing Provider   acetaminophen (TYLENOL) 325 MG tablet Take 650 mg by mouth every 6 (six) hours as needed for Temperature greater than.   Yes Provider, Historical   apixaban (ELIQUIS) 5 mg Tab Take 1 tablet (5 mg total) by mouth 2 (two) times daily. 4/3/24  Yes Jori Byrd MD   ascorbic acid, vitamin C, (VITAMIN C) 500 MG tablet Take 500 mg by mouth 2 (two) times daily.   Yes Provider, Historical   B COMPLEX-VITAMIN B12 tablet Take 1 tablet by mouth once daily. 1/3/23  Yes Provider, Historical   BD AUTOSHIELD DUO PEN NEEDLE 30 gauge x 3/16" Ndle  11/3/23  Yes Provider, Historical   BD ULTRA-FINE ADITYA PEN NEEDLE 32 gauge x 5/32" Ndle USE FOUR TIMES DAILY WITH MEALS AND NIGHTLY 11/20/19  Yes Mriiam Duong NP   blood sugar diagnostic " "(CONTOUR TEST STRIPS) Strp 1 strip by Misc.(Non-Drug; Combo Route) route 4 (four) times daily. Use to check blood glucose 4x daily 4/5/24  Yes Jori Byrd MD   furosemide (LASIX) 20 MG tablet Take 1 tablet (20 mg total) by mouth once daily. 4/3/24 4/3/25 Yes Jori Byrd MD   gabapentin (NEURONTIN) 300 MG capsule Take 300 mg by mouth 3 (three) times daily. 11/3/23  Yes Provider, Historical   HYDROcodone-acetaminophen (NORCO) 5-325 mg per tablet Take 1 tablet by mouth every morning. If needed for wound care 4/25/24  Yes Nael Diallo MD   insulin aspart U-100 (NOVOLOG) 100 unit/mL (3 mL) InPn pen Inject 5 Units into the skin 3 (three) times daily with meals. 4/5/24 4/5/25 Yes Jori Byrd MD   insulin detemir U-100, Levemir, (LEVEMIR FLEXPEN) 100 unit/mL (3 mL) InPn pen Inject 12 Units into the skin 2 (two) times daily. 4/3/24 4/3/25 Yes Jori Byrd MD   isosorbide dinitrate (ISORDIL) 10 MG tablet Take 1 tablet (10 mg total) by mouth 3 (three) times daily. Hold until follow up with a provider 3/17/24 3/17/25 Yes Milagros Nicolas MD   lancets (MICROLET LANCET) Misc 1 each by Misc.(Non-Drug; Combo Route) route 4 (four) times daily. Use to check blood sugars 4x daily 4/5/24  Yes Jori Byrd MD   multivitamin (THERAGRAN) per tablet Take 1 tablet by mouth once daily.   Yes Provider, Historical   NIFEdipine (PROCARDIA-XL) 90 MG (OSM) 24 hr tablet Take 1 tablet (90 mg total) by mouth once daily. 4/4/24 4/4/25 Yes Jori Byrd MD   pantoprazole (PROTONIX) 40 MG tablet Take 40 mg by mouth once daily. Before breakfast 2/6/23  Yes Provider, Historical   pen needle, diabetic 32 gauge x 5/32" Ndle use as directed with insulin 5/25/23  Yes Anna Mcdonnell MD   rosuvastatin (CRESTOR) 40 MG Tab Take 40 mg by mouth once daily. 4/9/21  Yes Provider, Historical   tamsulosin (FLOMAX) 0.4 mg Cap Take 0.4 mg by mouth once daily.   Yes Provider, Historical   acidophilus-pectin, citrus 100 million cell-10 mg Cap Take 1 " "capsule by mouth 2 (two) times a day.    Provider, Historical   clopidogreL (PLAVIX) 75 mg tablet Take 1 tablet (75 mg total) by mouth once daily. 9/20/22 10/24/23  Merlin Kearney MD   hydroCHLOROthiazide (HYDRODIURIL) 25 MG tablet Take 0.5 tablets (12.5 mg total) by mouth every Mon, Wed, Fri. Beginning on 2022  Keerthi Mayorga MD       Family History:  Family History   Problem Relation Name Age of Onset    Diabetes Mother      Heart disease Mother      Stroke Sister      Cancer Brother      Diabetes Sister         Social History:  Social History     Tobacco Use    Smoking status: Never    Smokeless tobacco: Never   Substance Use Topics    Alcohol use: No     Comment: occassional    Drug use: No       Review of Systems:  As described in HPI. All other systems are reviewed and are negative.    Health Maintaince :   Immunizations:   Receives care at [email or web address]    Cancer Screening:  Colonoscopy: not UTD per patient     Objective:   Last 24 Hour Vital Signs:  BP  Min: 126/60  Max: 143/63  Temp  Av.8 °F (37.1 °C)  Min: 98.6 °F (37 °C)  Max: 99 °F (37.2 °C)  Pulse  Av.8  Min: 71  Max: 91  Resp  Av.2  Min: 13  Max: 25  SpO2  Av %  Min: 95 %  Max: 100 %  Height  Av' 6" (167.6 cm)  Min: 5' 6" (167.6 cm)  Max: 5' 6" (167.6 cm)  Weight  Av.7 kg (200 lb)  Min: 90.7 kg (200 lb)  Max: 90.7 kg (200 lb)  Body mass index is 32.28 kg/m².  No intake/output data recorded.    Physical Examination:  Physical Exam  Constitutional:       General: He is not in acute distress.  HENT:      Head: Normocephalic and atraumatic.      Mouth/Throat:      Mouth: Mucous membranes are moist.      Pharynx: Oropharynx is clear.   Eyes:      Extraocular Movements: Extraocular movements intact.      Conjunctiva/sclera: Conjunctivae normal.   Cardiovascular:      Rate and Rhythm: Normal rate and regular rhythm.   Pulmonary:      Effort: No respiratory distress.      Breath sounds: Normal breath " sounds.   Abdominal:      General: Abdomen is flat.      Palpations: Abdomen is soft.   Musculoskeletal:      Right lower leg: Edema present.      Comments: L AKA with healed site and no drainage or purulence   Skin:     General: Skin is warm.      Capillary Refill: Capillary refill takes less than 2 seconds.      Coloration: Skin is not jaundiced.   Neurological:      Mental Status: He is alert and oriented to person, place, and time.      Comments: 3/5 strength in RLE upon flexion and extension   Psychiatric:         Mood and Affect: Mood normal.         Behavior: Behavior normal.          Laboratory:  Most Recent Data:  CBC:   Lab Results   Component Value Date    WBC 14.24 (H) 05/08/2024    HGB 7.5 (L) 05/08/2024    HCT 24.8 (L) 05/08/2024     (H) 05/08/2024    MCV 80 (L) 05/08/2024    RDW 17.6 (H) 05/08/2024       BMP:   Lab Results   Component Value Date     05/08/2024    K 3.8 05/08/2024     05/08/2024    CO2 28 05/08/2024    BUN 13 05/08/2024    CREATININE 1.0 05/08/2024     (H) 05/08/2024    CALCIUM 8.9 05/08/2024    MG 2.0 05/08/2024    PHOS 3.0 05/08/2024     LFTs:   Lab Results   Component Value Date    PROT 7.0 05/08/2024    ALBUMIN 1.8 (L) 05/08/2024    BILITOT 0.3 05/08/2024    AST 24 05/08/2024    ALKPHOS 78 05/08/2024    ALT 23 05/08/2024     Coags:   Lab Results   Component Value Date    INR 1.1 03/29/2024     FLP:   Lab Results   Component Value Date    CHOL 91 (L) 09/19/2022    HDL 40 09/19/2022    LDLCALC 37.4 (L) 09/19/2022    TRIG 68 09/19/2022    CHOLHDL 44.0 09/19/2022     DM:   Lab Results   Component Value Date    HGBA1C >14.0 (H) 03/05/2024    HGBA1C 11.0 (H) 09/13/2023    HGBA1C 8.4 (H) 03/10/2023    LDLCALC 37.4 (L) 09/19/2022    CREATININE 1.0 05/08/2024     Thyroid:   Lab Results   Component Value Date    TSH 2.04 09/12/2023    FREET4 0.92 03/20/2023     Anemia:   Lab Results   Component Value Date    IRON 11 (L) 03/16/2024    TIBC 197 (L) 03/16/2024     FERRITIN 144 03/16/2024    VUPRZRZM21 504 03/17/2024    FOLATE 4.2 02/19/2021     Cardiac:   Lab Results   Component Value Date    TROPONINI 0.043 (H) 03/05/2024     (H) 03/30/2024     Urinalysis:   Lab Results   Component Value Date    LABURIN No growth 10/09/2023    COLORU Yellow 05/08/2024    CLARITYU Slightly Cloudy 04/22/2022    SPECGRAV 1.020 05/08/2024    NITRITE Negative 05/08/2024    KETONESU Negative 05/08/2024    UROBILINOGEN 2.0-3.0 (A) 05/08/2024    WBCUA >100 (H) 05/08/2024       Trended Lab Data:  Recent Labs   Lab 05/08/24 2115 05/08/24 2118   WBC  --  14.24*   HGB  --  7.5*   HCT  --  24.8*   PLT  --  548*   MCV  --  80*   RDW  --  17.6*     --    K 3.8  --      --    CO2 28  --    BUN 13  --    CREATININE 1.0  --    *  --    PROT 7.0  --    ALBUMIN 1.8*  --    BILITOT 0.3  --    AST 24  --    ALKPHOS 78  --    ALT 23  --      Microbiology Data:  Blood cultures and Urine cx (5/8/24) pending    Other Results:  EKG (my interpretation): Irregularly irregular with rate of ~70; low voltage; no ST abnormalities    Radiology:  Imaging Results              CT Abdomen Pelvis With IV Contrast NO Oral Contrast (Final result)  Result time 05/09/24 02:12:24      Final result by Melvi Adkins MD (05/09/24 02:12:24)                   Impression:      Distal colon mild stool burden constipation.  No evidence of bowel obstruction or inflammatory changes..    Cholelithiasis without convincing cholecystitis by CT.  No biliary dilatation.    Sams catheter balloon is noted within the prostate and the Sams catheter tip is noted at the base of a nondistended bladder.  Note is made however of relative bladder wall thickening.  Correlate for trabeculation related to prostatomegaly or cystitis.    Bilateral inguinal adenopathy.    Mild anasarca.    There are multiple additional nonacute findings in the abdomen or pelvis as described above.      Electronically signed by: Melvi  Lucille  Date:    05/09/2024  Time:    02:12               Narrative:    EXAMINATION:  CT ABDOMEN PELVIS WITH IV CONTRAST    CLINICAL HISTORY:  Sepsis;    TECHNIQUE:  Low dose axial images, sagittal and coronal reformations were obtained from the lung bases to the pubic symphysis following the IV administration of 100 mL of Omnipaque 350 .  Oral contrast was not administered.    COMPARISON:  CT abdomen pelvis 02/03/2012, CTA abdomen pelvis with runoff 06/16/2022    FINDINGS:  Abdomen:    - Lower thorax:Visualized heart is not significantly enlarged.  There is no pericardial effusion.    - Lung bases: No infiltrates and no nodules.    - Liver: Liver appears homogeneous and not significantly enlarged.    - Gallbladder: Gallbladder contains innumerable gallstones.  There is no CT evidence of cholecystitis or gallbladder distension.    - Bile Ducts: No evidence of intra or extra hepatic biliary ductal dilation.    - Spleen: Negative.    - Kidneys: Left renal simple cyst noted in the upper pole.  Too small to characterize low densities possibly cysts also noted in the interpolar region.  Punctate nonobstructing calcification noted in the right kidney lower pole.    - Adrenals: Unremarkable.    - Pancreas: No mass or peripancreatic fat stranding.    - Retroperitoneum:  No significant adenopathy.    - Vascular: No abdominal aortic aneurysm.  There is mild scattered atherosclerosis in the aorta and iliac arteries.  IVC filter is present.    - Abdominal wall:  Mild stranding in the subcutaneous fat suggests anasarca..    Pelvis:    Sams catheter tip is noted within a collapsed bladder with air-fluid level noted.  Sams catheter balloon is noted in the prostate.  There is diffuse bladder wall thickening which may be related to nondistended state however the degree of thickening raises question of cystitis versus trabeculation related to a bladder prominent prostate.  There is no pelvic mass, adenopathy, or free fluid.   There is a right hydrocele again noted.  Prostate is prominent.    There is inguinal adenopathy bilaterally.  No pelvic adenopathy.    Bowel/Mesentery:    There is mild stool burden constipation in the distal colon and rectum.  No evidence of stercoral colitis no evidence of bowel obstruction or inflammation.  The appendix is normal.    Bones:  No acute osseous abnormality and no suspicious lytic or blastic lesion.  There is spondylosis of the visualized spine.  No acute compression fractures are seen.  Degenerative changes of the hips bilaterally as well as SI joints.                                       X-Ray Chest 1 View (Final result)  Result time 05/08/24 22:02:26      Final result by Melvi Adkins MD (05/08/24 22:02:26)                   Impression:      No acute interval detrimental changes involving the lungs compared to 04/01/2024.    Small right pleural effusion is noted as well as linear basilar opacities suggesting atelectasis.  Residual pneumonitis in the right lung base not reliably excluded.    Interval resolution of the mid lung opacities.      Electronically signed by: Melvi Adkins  Date:    05/08/2024  Time:    22:02               Narrative:    EXAMINATION:  XR CHEST 1 VIEW    CLINICAL HISTORY:  Sepsis;    TECHNIQUE:  Single frontal view of the chest was performed.    COMPARISON:  04/01/2024, 03/29/2024 chest x-rays    FINDINGS:  The cardiomediastinal silhouette is stable and not significantly enlarged.  There is some persistent right costophrenic angle blunting compatible with small right pleural effusion.  Mild linear lung base opacities on the right noted.  There is interval resolution of the upper to mid lung opacities on the right.  Left lung centrally appears clear.  There is no evidence of pneumothorax.  Degenerative changes of the spine and shoulders noted.                                         Assessment:     Saji Castañeda is a 74 y.o. -American male who has a past  medical history of DMII, HLD, HTN, Left AKA, PAD, Microcytic Anemia, Pacemaker for 3rd degree heart block. The patient presented to [facility] on 5/8/2024 with a primary complaint of elevated white blood cell count and cloudy output from indwelling Sams catheter site.     Plan:     Sepsis of unknown source  - evaluation of systems make it likely due to Urosepsis, particularly in setting of Sams catheter and cloudy, thick appearance of urine in catheter  - Meets SIRS criteria with WBC of 14k with RR of 22  - afebrile on presentation  - UA remarkable for leukocytes and urobilinogen  - received Rocephin in ED  - Urine cx from May 2023 with Klebsiella near pan-susceptible  - awaiting Urine cx and blood cx  - will provide Vancomycin and Cefepime, assess response, and narrow Abx  - consult to Urology as unsure when patient due for follow up  - Abd/pelvic CT obtained to ensure no obstruction or concerning lesions: revealing of bladder wall thickening and bilateral inguinal lymphadenopathy  - will obtain imaging of RLE to ensure no infectious process    DMII  - A1c of >14 in 3/2024  - hospitalized for DKA around this time and evaluated by Endocrinology for improved glucose control  - regimen of Levemir 12u BID and Aspart 5u with meals  - Levemir not on formulary, so will provide Glargine instead along with Aspart  - Diabetic diet    HLD   - on Rosuvastatin 40 at home: not on formulary, so will provide Atorvastatin    HTN  - continue home regimen of isosorbide dinitrate 10 TID, nifedipine 90  - additional benefit from home Lasix 20    Left AKA from chronic osteomyelitis  - site stable  - imaging of RLE as described above    PAD  - lipid control as above    RUE DVT  - discovered roughly 2 months ago  - contiue home regimen of Eliquis 5 BID    Pacemaker for 3rd degree heart block  - pacer in place for pronounced bradycardia  - will monitor on telemetry, particularly in setting of likely concurrent atrial  fibrillation      Code: Full per patient  DVT ppx: on Eliquis  Diet: Diabetic  Dispo: 2-3 days for full workup andinfectious treatment    Sky Ty MD  hospitals Internal Medicine [facility] [place] [facility] Hospitalist Pager numbers:   hospitals Hospitalist Medicine Team A (Lorne/Riki): [phone]  hospitals Hospitalist Medicine Team B (Arik/Lucio):  [phone]

## 2024-05-09 NOTE — PROGRESS NOTES
Pharmacist Renal Dose Adjustment Note    Saji Castañeda is a 74 y.o. male being treated with the medication cefepime    Patient Data:    Vital Signs (Most Recent):  Temp: 98.6 °F (37 °C) (05/09/24 0030)  Pulse: 91 (05/09/24 0030)  Resp: (!) 25 (05/09/24 0030)  BP: (!) 143/63 (05/09/24 0030)  SpO2: 96 % (05/09/24 0030) Vital Signs (72h Range):  Temp:  [98.6 °F (37 °C)-99 °F (37.2 °C)]   Pulse:  [71-91]   Resp:  [13-25]   BP: (126-143)/(48-64)   SpO2:  [95 %-100 %]      Recent Labs   Lab 05/08/24 2115   CREATININE 1.0     Serum creatinine: 1 mg/dL 05/08/24 2115  Estimated creatinine clearance: 67.3 mL/min    Medication:cefepime dose: 1G frequency q12h will be changed to medication:cefepime dose:1G frequency:q8h    Pharmacist's Name: Dennise Maria  Pharmacist's Extension: [phone]

## 2024-05-09 NOTE — CONSULTS
[facility] Surg  Wound Care    Patient Name:  Saji Castañeda   MRN:  [id number]  Date: 5/9/2024  Diagnosis: Sepsis    History:     Past Medical History:   Diagnosis Date    Arthritis     legs    Bacteremia due to Gram-negative bacteria 3/23/2021    Diabetes mellitus     Diabetes mellitus, type 2     Hyperlipidemia     Hypertension     Osteomyelitis     Palliative care encounter 5/24/2023       Social History     Socioeconomic History    Marital status: Single   Tobacco Use    Smoking status: Never    Smokeless tobacco: Never   Substance and Sexual Activity    Alcohol use: No     Comment: occassional    Drug use: No    Sexual activity: Not Currently   Social History Narrative    Not currently working; lives with family     Social Determinants of Health     Financial Resource Strain: Medium Risk (3/5/2024)    Overall Financial Resource Strain (CARDIA)     Difficulty of Paying Living Expenses: Somewhat hard   Food Insecurity: No Food Insecurity (3/5/2024)    Hunger Vital Sign     Worried About Running Out of Food in the Last Year: Never true     Ran Out of Food in the Last Year: Never true   Transportation Needs: No Transportation Needs (3/5/2024)    PRAPARE - Transportation     Lack of Transportation (Medical): No     Lack of Transportation (Non-Medical): No   Physical Activity: Inactive (3/5/2024)    Exercise Vital Sign     Days of Exercise per Week: 0 days     Minutes of Exercise per Session: 0 min   Stress: No Stress Concern Present (3/5/2024)    [employer] - Occupational Stress Questionnaire     Feeling of Stress : Only a little   Housing Stability: Low Risk  (3/5/2024)    Housing Stability Vital Sign     Unable to Pay for Housing in the Last Year: No     Number of Places Lived in the Last Year: 1     Unstable Housing in the Last Year: No       Precautions:     Allergies as of 05/08/2024    (No Known Allergies)       WOC Assessment Details/Treatment     L ischial- 2x1x0.1cm- full  thickness pressure injury- unsure of history of the stage- slough/pink tissue- healing edges  Bilateral buttocks- full thickness pressure injury- 19v27x3.1cm- extends to gluteal cleft and outer R buttock- unsure of history of the stage- slough/pink tissue- healing edges- island of epithleium to R buttock        L hip- full thickness pressure injury- 3x5x0.1cm- unsure of history of the stage- slough/pink tissue- healing edges        R heel- unstageable pressure injury- eschar- 5.5x.3.5x0.1cm        R back- healed ulceration- scar tissue intact        R 5th toe- dried DM foot ulcer 0.5x0.2x0.1cm      Penis- denuded skin to head- excoriated scrotum       Recommendations discussed with pt, nurse and Dr. Ty:  - Pressure injury prevention interventions - waffle overlay and heel boots  - Vashe wet-to dry dressings BID to buttocks L ischial, and L hip wounds  - Betadine to R heel and R 5th toe BID  - Triad ointment to penis and scrotum    05/09/2024

## 2024-05-09 NOTE — PROGRESS NOTES
"Pharmacokinetic Initial Assessment: IV Vancomycin    Assessment/Plan:    Initiate intravenous vancomycin with loading dose of 2250 mg once followed by a maintenance dose of vancomycin 2250mg IV every 24 hours  Desired empiric serum trough concentration is 15 to 20 mcg/mL  Draw vancomycin trough level 60 min prior to third dose on 5/10 at approximately 0400  Pharmacy will continue to follow and monitor vancomycin.      Please contact pharmacy at extension [phone] with any questions regarding this assessment.     Thank you for the consult,   Dennise Hernándezy       Patient brief summary:  Saji Castañeda is a 74 y.o. male initiated on antimicrobial therapy with IV Vancomycin for treatment of suspected sepsis    Drug Allergies:   Review of patient's allergies indicates:  No Known Allergies    Actual Body Weight:   90.7 kg    Renal Function:   Estimated Creatinine Clearance: 67.3 mL/min (based on SCr of 1 mg/dL).,     Dialysis Method (if applicable):  N/A    CBC (last 72 hours):  Recent Labs   Lab Result Units 05/08/24 2118   WBC K/uL 14.24*   Hemoglobin g/dL 7.5*   Hematocrit % 24.8*   Platelets K/uL 548*   Gran % % 79.9*   Lymph % % 11.2*   Mono % % 5.4   Eosinophil % % 2.7   Basophil % % 0.3   Differential Method  Automated       Metabolic Panel (last 72 hours):  Recent Labs   Lab Result Units 05/08/24 2115 05/08/24  2121   Sodium mmol/L 141  --    Potassium mmol/L 3.8  --    Chloride mmol/L 101  --    CO2 mmol/L 28  --    Glucose mg/dL 131*  --    Glucose, UA   --  Negative   BUN mg/dL 13  --    Creatinine mg/dL 1.0  --    Albumin g/dL 1.8*  --    Total Bilirubin mg/dL 0.3  --    Alkaline Phosphatase U/L 78  --    AST U/L 24  --    ALT U/L 23  --    Magnesium mg/dL 2.0  --    Phosphorus mg/dL 3.0  --        Drug levels (last 3 results):  No results for input(s): "VANCOMYCINRA", "VANCORANDOM", "VANCOMYCINPE", "VANCOPEAK", "VANCOMYCINTR", "VANCOTROUGH" in the last 72 hours.    Microbiologic Results:  Microbiology Results " (last 7 days)       Procedure Component Value Units Date/Time    Blood culture x two cultures. Draw prior to antibiotics [[id number]] Collected: 05/08/24 2110    Order Status: Sent Specimen: Blood from Peripheral, Antecubital, Right Updated: 05/09/24 0202    Blood culture x two cultures. Draw prior to antibiotics [[id number]] Collected: 05/08/24 2115    Order Status: Sent Specimen: Blood from Peripheral, Hand, Right Updated: 05/09/24 0202    Urine culture [[id number]] Collected: 05/08/24 2121    Order Status: No result Specimen: Urine Updated: 05/08/24 2141

## 2024-05-09 NOTE — SUBJECTIVE & OBJECTIVE
Past Medical History:   Diagnosis Date    Arthritis     legs    Bacteremia due to Gram-negative bacteria 3/23/2021    Diabetes mellitus     Diabetes mellitus, type 2     Hyperlipidemia     Hypertension     Osteomyelitis     Palliative care encounter 5/24/2023       Past Surgical History:   Procedure Laterality Date    ABOVE-KNEE AMPUTATION Left 3/27/2024    Procedure: AMPUTATION, ABOVE KNEE;  Surgeon: Adal Arellano MD;  Location: [address];  Service: Vascular;  Laterality: Left;    ANGIOGRAPHY OF LOWER EXTREMITY N/A 2/3/2021    Procedure: Angiogram Extremity Bilateral;  Surgeon: Ernst Chacko MD;  Location: [facility] OR [address] [place] [address];  Service: Peripheral Vascular;  Laterality: N/A;  7.4 mintues fluoroscopy time  816.15 mGy  170.17 Gycm2    AORTOGRAPHY WITH EXTREMITY RUNOFF Bilateral 2/3/2021    Procedure: AORTOGRAM, WITH EXTREMITY RUNOFF;  Surgeon: Ernst Chacko MD;  Location: [address];  Service: Peripheral Vascular;  Laterality: Bilateral;    DEBRIDEMENT OF FOOT Left 2/23/2021    Procedure: DEBRIDEMENT, LEFT HEEL;  Surgeon: Mayra Schroeder DPM;  Location: [address];  Service: Podiatry;  Laterality: Left;    FOOT AMPUTATION  October 2010    left high midfoot amputation    IMPLANTATION OF LEADLESS PACEMAKER N/A 10/12/2023    Procedure: [id number];  Surgeon: VANESSA Delatorre MD;  Location: [facility] EP LAB;  Service: Cardiology;  Laterality: N/A;  AVB, MICRA, EH, [place]ES, [place] [address]       Review of patient's allergies indicates:  No Known Allergies    Family History       Problem Relation (Age of Onset)    Cancer Brother    Diabetes Mother, Sister    Heart disease Mother    Stroke Sister            Tobacco Use    Smoking status: Never    Smokeless tobacco: Never   Substance and Sexual Activity    Alcohol use: No     Comment: occassional    Drug use: No    Sexual activity: Not Currently       Review of Systems   Constitutional:  Negative for activity change,  "chills and fever.   Respiratory:  Negative for shortness of breath.    Cardiovascular:  Negative for chest pain.   Gastrointestinal:  Negative for abdominal pain, diarrhea, nausea and vomiting.   Genitourinary:  Positive for difficulty urinating.   Neurological:  Negative for dizziness and weakness.       Objective:     Temp:  [98.5 °F (36.9 °C)-99.9 °F (37.7 °C)] 99.9 °F (37.7 °C)  Pulse:  [] 112  Resp:  [13-25] 20  SpO2:  [95 %-100 %] 99 %  BP: (126-148)/(48-69) 127/58  Weight: 90.7 kg (200 lb)  Body mass index is 32.28 kg/m².           Drains       Drain  Duration                  Urethral Catheter -- days                     Physical Exam  Vitals reviewed.   Constitutional:       General: He is not in acute distress.     Appearance: He is well-developed. He is not diaphoretic.   HENT:      Head: Normocephalic and atraumatic.   Eyes:      General: No scleral icterus.  Cardiovascular:      Rate and Rhythm: Tachycardia present.   Pulmonary:      Effort: Pulmonary effort is normal. No respiratory distress.      Breath sounds: No stridor.   Genitourinary:     Comments: Sams catheter draining yellow urine with sediment after repositioning  Ventral erosion present  Musculoskeletal:      Cervical back: Normal range of motion.   Neurological:      Mental Status: He is alert.   Psychiatric:         Behavior: Behavior normal.          Significant Labs:    BMP:  Recent Labs   Lab 05/08/24 2115 05/09/24 0523    142   K 3.8 3.8    102   CO2 28 28   BUN 13 11   CREATININE 1.0 0.8   CALCIUM 8.9 8.7       CBC:  Recent Labs   Lab 05/08/24 2118 05/09/24  0523   WBC 14.24* 15.84*   HGB 7.5* 7.9*   HCT 24.8* 26.8*   * 582*       Blood Culture:   Recent Labs   Lab 05/08/24 2110 05/08/24 2115   LABBLOO No Growth to date No Growth to date     Urine Culture: No results for input(s): "LABURIN" in the last 168 hours.  Urine Studies:   Recent Labs   Lab 05/08/24 2121   COLORU Yellow   APPEARANCEUA Cloudy* "   PHUR 7.0   SPECGRAV 1.020   PROTEINUA 2+*   GLUCUA Negative   KETONESU Negative   BILIRUBINUA Negative   OCCULTUA 2+*   NITRITE Negative   UROBILINOGEN 2.0-3.0*   LEUKOCYTESUR 3+*   RBCUA 37*   WBCUA >100*   BACTERIA Occasional   HYALINECASTS 0       Significant Imaging:  All pertinent imaging results/findings from the past 24 hours have been reviewed.

## 2024-05-09 NOTE — PLAN OF CARE
05/09/24 0439   Admission   Initial VN Admission Questions Complete   Communication Issues? None   Shift   Pain Management Interventions quiet environment facilitated;relaxation techniques promoted   Virtual Nurse - Patient Verbalized Approval Of VN Rounding;Camera Use   Type of Frequent Check   Type Telemetry Monitoring;Patient Rounds   Safety/Activity   Patient Rounds bed in low position;bed wheels locked;call light in patient/parent reach;clutter free environment maintained;ID band on;visualized patient;placement of personal items at bedside   Safety Promotion/Fall Prevention assistive device/personal item within reach;room near unit station;side rails raised x 2;bed alarm set;nonskid shoes/socks when out of bed;high risk medications identified   Safety Precautions emergency equipment at bedside   Safety Bands on Patient Fall Risk Band   Activity Management Rolling - L1   Activity Assistance Provided assistance, 2 people   Positioning   Body Position position maintained   Head of Bed (HOB) Positioning HOB elevated   Positioning/Transfer Devices pillows;in use        VIRTUAL NURSE: Pt arrived to unit. Permission received per patient to turn camera to view patient. VIP model explained; patient informed this VN will be working with bedside nurse and the rest of the care team. Plan of care reviewed with patient.  Educated patient on VTE, fall risk and fall risk precautions in place. Call light within reach, side rails up x2. Admission questions completed. Patient instucted to ask staff for assistance. Patient verbalized complete understanding. Patient denies complaints or any needs at this time. Will continue to be available and intervene as needed.

## 2024-05-09 NOTE — ED TRIAGE NOTES
Presents to ED via [facility] from NH with abnormal labs.   Pt c/o pain to buttocks. Pt with multiple wounds to sacrum, buttocks and R foot. Pt with L AKA that was done approximately a month ago per pt. Pt also has indwelling fung with foul smell. Pt is in a brief and has scant amouint of purulent drainage at base of urethra.    Pt is AAO x 3.  Calm and cooperative. NSR on CM. VSS. NADN.

## 2024-05-09 NOTE — PLAN OF CARE
SW met with pt at bedside this AM to complete DCA. Pt reported that he was having 9/10 pain but was in a pleasant mood with positive affect throughout assessment and agreed to answer sw questions. Per pt he has lived at [facility] Phone: [phone]   for a couple of months. Per pt he has a wc that he will use at the facility to complete his ADL's. Pt did not have any additional questions or concerns for sw at this time. White board updated with CM name and contact information.  Discharge brochure provided.  Pt encouraged to call with any questions or concerns.  Cm will continue to follow pt through transitions of care and assist with any discharge needs.    AMAYA Desai  [phone]     05/09/24 1136   Discharge Assessment   Assessment Type Discharge Planning Assessment   Confirmed/corrected address, phone number and insurance Yes   Confirmed Demographics Correct on Facesheet   Source of Information patient   When was your last doctors appointment?   (per pt been a while)   Communicated OXANA with patient/caregiver Yes   Reason For Admission sepsis   People in Home alone   Facility Arrived From: [place]   Prior to hospitilization cognitive status: Alert/Oriented   Current cognitive status: Alert/Oriented   Walking or Climbing Stairs Difficulty no   Dressing/Bathing Difficulty no   Do you have any problems with: Errands/Grocery   Home Accessibility wheelchair accessible   Home Layout Able to live on 1st floor   Equipment Currently Used at Home none   Readmission within 30 days? No   Patient currently being followed by outpatient case management? No   Do you currently have service(s) that help you manage your care at home? No   Do you take prescription medications? Yes   Do you have prescription coverage? Yes   Coverage [employer]   Do you have any problems affording any of your prescribed medications? Yes   Is the patient taking medications as prescribed? yes   Who is going to help you get home at  discharge? facility   How do you get to doctors appointments? health plan transportation   Are you on dialysis? No   Do you take coumadin? No   Discharge Plan A Return to nursing home   DME Needed Upon Discharge  none   Discharge Plan discussed with: Patient   Transition of Care Barriers None

## 2024-05-09 NOTE — HPI
Saji Castañeda is a 75 yo M with a history of AF, DM, HLD, HLD, osteomyelitis presently admitted for leukocytosis. Urology was consulted for retention.    Patient previously seen at [facility] for similar issue, had a catheter placed by urology at that time. Plan was for inpatient voiding trial with outpatient follow up. Unclear if voiding trial ever occurred, patient states that he has had catheters since that time and is typically unable to urinate without one.     On assessment, patient is AF (Tmax 99.9), with low grade tachycardia. Leukocytosis to 15, Cr 0.8 (baseline 1.0). UA LE positive, nitrite negative. Microscopy with occasional bacteria. CT scan shows normal course and caliber of bilateral ureters. Sams balloon malpositioned on imaging.

## 2024-05-09 NOTE — PLAN OF CARE
Pt resting in bed with eyes closed. Respirations even and unlabored. No c/o pain, N/V. Medications administered per MAR. On RA. Sams to gravity, draining cloudy yellow urine. Cardiac monitoring maintained. Wounds cleansed and dressings changed upon arrival to floor. Bed alarm set, side rails raised, and call light in reach.

## 2024-05-09 NOTE — ED PROVIDER NOTES
Encounter Date: 5/8/2024       History     Chief Complaint   Patient presents with    Blood Infection     Pt sent to the for evaluation of abnormal labs from [facility]. Pt had an elevated WBC on lab work drawn today at the NH. Pt has indwelling fung catheter with cloudy urine output.      74-year-old male with past medical history hypertension, hyperlipidemia, diabetes, and additionally as below brought in by EMS from outside facility for evaluation of elevated white count and cloudy urine.  On questioning, patient reports pain in his left buttock and dysuria.  He denies fevers/chills, chest pain, abdominal pain, shortness of breath, nausea/vomiting, diarrhea, constipation.    The history is provided by the patient.     Review of patient's allergies indicates:  No Known Allergies  Past Medical History:   Diagnosis Date    Arthritis     legs    Bacteremia due to Gram-negative bacteria 3/23/2021    Diabetes mellitus     Diabetes mellitus, type 2     Hyperlipidemia     Hypertension     Osteomyelitis     Palliative care encounter 5/24/2023     Past Surgical History:   Procedure Laterality Date    ABOVE-KNEE AMPUTATION Left 3/27/2024    Procedure: AMPUTATION, ABOVE KNEE;  Surgeon: Adal Arellano MD;  Location: [facility] OR [address] [place] [address];  Service: Vascular;  Laterality: Left;    ANGIOGRAPHY OF LOWER EXTREMITY N/A 2/3/2021    Procedure: Angiogram Extremity Bilateral;  Surgeon: Ernst Chacko MD;  Location: [facility] OR [address] [place] [address];  Service: Peripheral Vascular;  Laterality: N/A;  7.4 mintues fluoroscopy time  816.15 mGy  170.17 Gycm2    AORTOGRAPHY WITH EXTREMITY RUNOFF Bilateral 2/3/2021    Procedure: AORTOGRAM, WITH EXTREMITY RUNOFF;  Surgeon: Ernst Chacko MD;  Location: [facility] OR [address] [place] [address];  Service: Peripheral Vascular;  Laterality: Bilateral;    DEBRIDEMENT OF FOOT Left 2/23/2021    Procedure: DEBRIDEMENT, LEFT HEEL;  Surgeon: Mayra Schroeder DPM;  Location: [facility] OR [address] [place] [address];  Service: Podiatry;   Laterality: Left;    FOOT AMPUTATION  October 2010    left high midfoot amputation    IMPLANTATION OF LEADLESS PACEMAKER N/A 10/12/2023    Procedure: [id number];  Surgeon: VANESSA Delatorre MD;  Location: [facility] EP LAB;  Service: Cardiology;  Laterality: N/A;  AVB, MICRA, EH, [place], [place] [address]     Family History   Problem Relation Name Age of Onset    Diabetes Mother      Heart disease Mother      Stroke Sister      Cancer Brother      Diabetes Sister       Social History     Tobacco Use    Smoking status: Never    Smokeless tobacco: Never   Substance Use Topics    Alcohol use: No     Comment: occassional    Drug use: No         Physical Exam     Initial Vitals [05/08/24 2046]   BP Pulse Resp Temp SpO2   (!) 126/48 72 16 99 °F (37.2 °C) 99 %      MAP       --         Physical Exam    Nursing note and vitals reviewed.  Constitutional: He appears ill.   Cardiovascular:  Normal rate and normal heart sounds. An irregularly irregular rhythm present.           Pulmonary/Chest: Breath sounds normal. No respiratory distress.   Abdominal: Abdomen is soft. There is no abdominal tenderness.   Musculoskeletal:      Comments: Left above knee amputation.  Surgical wound is well healing, C/D/I     Neurological: He is alert. GCS score is 15. GCS eye subscore is 4. GCS verbal subscore is 5. GCS motor subscore is 6.   Skin: Skin is warm and dry.   Numerous poorly healing wounds over right lower extremity extending from the mid calf to the toes  Multiple decubitus ulcers with full-thickness skin breakdown over the sacrum and buttocks         ED Course   Procedures  Labs Reviewed   CBC W/ AUTO DIFFERENTIAL - Abnormal; Notable for the following components:       Result Value    WBC 14.24 (*)     RBC 3.10 (*)     Hemoglobin 7.5 (*)     Hematocrit 24.8 (*)     MCV 80 (*)     MCH 24.2 (*)     MCHC 30.2 (*)     RDW 17.6 (*)     Platelets 548 (*)     MPV 8.7 (*)     Gran # (ANC) 11.4 (*)     Immature Grans (Abs) 0.07 (*)      Gran % 79.9 (*)     Lymph % 11.2 (*)     All other components within normal limits   COMPREHENSIVE METABOLIC PANEL - Abnormal; Notable for the following components:    Glucose 131 (*)     Albumin 1.8 (*)     All other components within normal limits   URINALYSIS, REFLEX TO URINE CULTURE - Abnormal; Notable for the following components:    Appearance, UA Cloudy (*)     Protein, UA 2+ (*)     Occult Blood UA 2+ (*)     Urobilinogen, UA 2.0-3.0 (*)     Leukocytes, UA 3+ (*)     All other components within normal limits    Narrative:     Specimen Source->Urine   URINALYSIS MICROSCOPIC - Abnormal; Notable for the following components:    RBC, UA 37 (*)     WBC, UA >100 (*)     WBC Clumps, UA Many (*)     All other components within normal limits    Narrative:     Specimen Source->Urine   CULTURE, BLOOD   CULTURE, BLOOD   CULTURE, URINE   LACTIC ACID, PLASMA   MAGNESIUM   PHOSPHORUS   LACTIC ACID, PLASMA          Imaging Results              X-Ray Chest 1 View (Final result)  Result time 05/08/24 22:02:26      Final result by Melvi Adkins MD (05/08/24 22:02:26)                   Impression:      No acute interval detrimental changes involving the lungs compared to 04/01/2024.    Small right pleural effusion is noted as well as linear basilar opacities suggesting atelectasis.  Residual pneumonitis in the right lung base not reliably excluded.    Interval resolution of the mid lung opacities.      Electronically signed by: Melvi Adkins  Date:    05/08/2024  Time:    22:02               Narrative:    EXAMINATION:  XR CHEST 1 VIEW    CLINICAL HISTORY:  Sepsis;    TECHNIQUE:  Single frontal view of the chest was performed.    COMPARISON:  04/01/2024, 03/29/2024 chest x-rays    FINDINGS:  The cardiomediastinal silhouette is stable and not significantly enlarged.  There is some persistent right costophrenic angle blunting compatible with small right pleural effusion.  Mild linear lung base opacities on the right  noted.  There is interval resolution of the upper to mid lung opacities on the right.  Left lung centrally appears clear.  There is no evidence of pneumothorax.  Degenerative changes of the spine and shoulders noted.                                       Medications   sodium chloride 0.9% bolus 1,914 mL 1,914 mL (1,914 mLs Intravenous New Bag 5/8/24 2211)   cefTRIAXone (ROCEPHIN) 2 g in dextrose 5 % in water (D5W) 100 mL IVPB (MB+) (0 g Intravenous Stopped 5/8/24 2243)     Medical Decision Making  74-year-old male with past medical history hypertension, hyperlipidemia, diabetes, and additionally as below brought in by EMS from outside facility for evaluation of elevated white count and cloudy urine.    Pathologies I have considered in my differential diagnosis include, but are not limited to, UTI, pyelonephritis, infected nephrolithiasis, intra-abdominal abscess.    Patient with leukocytosis, cloudy urine, and urinalysis findings concerning UTI.  Patient started on ceftriaxone.    CT abdomen and pelvis pending at time of shift change.  Care of patient is assumed by oncoming team.    Amount and/or Complexity of Data Reviewed  Labs: ordered. Decision-making details documented in ED Course.  Radiology: ordered.               ED Course as of 05/08/24 2352   Wed May 08, 2024   2224 Comprehensive metabolic panel(!)  Decreased albumin, otherwise unremarkable [BH]   2225 CBC auto differential(!)  Leukocytosis with left shift concerning for infection  Anemia, unchanged from 1 month ago  Thrombocytosis [BH]   2225 Urinalysis, Reflex to Urine Culture Urine, Catheterized(!)  Urinalysis concerning for infection [BH]   2226 Lactic Acid, Plasma #1  Within normal limits [BH]   2226 Phosphorus [BH]   2226 Magnesium [BH]      ED Course User Index  [BH] Primo Vuong MD                           Clinical Impression:  Final diagnoses:  [D72.829] Elevated WBC count                 Primo Vuong MD  Resident  05/08/24 3964

## 2024-05-09 NOTE — CONSULTS
[place] [facility] Surg  Urology  Consult Note    Patient Name: Saji Castañeda  MRN: [id number]  Admission Date: 5/8/2024  Hospital Length of Stay: 0   Code Status: Full Code   Attending Provider: Vinod Elise MD   Consulting Provider: Patel Aceves MD  Primary Care Physician: [facility] -  Principal Problem:Sepsis    Inpatient consult to Urology  Consult performed by: Patel Aceves MD  Consult ordered by: Belkys Bustillo MD          Subjective:     HPI:  Saji Castañeda is a 75 yo M with a history of AF, DM, HLD, HLD, osteomyelitis presently admitted for leukocytosis. Urology was consulted for retention.    Patient previously seen at [facility] for similar issue, had a catheter placed by urology at that time. Plan was for inpatient voiding trial with outpatient follow up. Unclear if voiding trial ever occurred, patient states that he has had catheters since that time and is typically unable to urinate without one.     On assessment, patient is AF (Tmax 99.9), with low grade tachycardia. Leukocytosis to 15, Cr 0.8 (baseline 1.0). UA LE positive, nitrite negative. Microscopy with occasional bacteria. CT scan shows normal course and caliber of bilateral ureters. Sams balloon malpositioned on imaging.     Past Medical History:   Diagnosis Date    Arthritis     legs    Bacteremia due to Gram-negative bacteria 3/23/2021    Diabetes mellitus     Diabetes mellitus, type 2     Hyperlipidemia     Hypertension     Osteomyelitis     Palliative care encounter 5/24/2023       Past Surgical History:   Procedure Laterality Date    ABOVE-KNEE AMPUTATION Left 3/27/2024    Procedure: AMPUTATION, ABOVE KNEE;  Surgeon: Adal Arellano MD;  Location: [facility] OR [address] [place] [address];  Service: Vascular;  Laterality: Left;    ANGIOGRAPHY OF LOWER EXTREMITY N/A 2/3/2021    Procedure: Angiogram Extremity Bilateral;  Surgeon: Ernst Chacko MD;  Location: [facility] OR [address] [place] [address];  Service: Peripheral Vascular;  Laterality: N/A;  7.4  magdaleno fluoroscopy time  816.15 mGy  170.17 Gycm2    AORTOGRAPHY WITH EXTREMITY RUNOFF Bilateral 2/3/2021    Procedure: AORTOGRAM, WITH EXTREMITY RUNOFF;  Surgeon: Ernst Chacko MD;  [facility];  Service: Peripheral Vascular;  Laterality: Bilateral;    DEBRIDEMENT OF FOOT Left 2/23/2021    Procedure: DEBRIDEMENT, LEFT HEEL;  Surgeon: Mayra Schroeder DPM;  Location: [facility] OR 1ST FLR;  Service: Podiatry;  Laterality: Left;    FOOT AMPUTATION  October 2010    left high midfoot amputation    IMPLANTATION OF LEADLESS PACEMAKER N/A 10/12/2023    Procedure: [id number];  Surgeon: VANESSA Delatorre MD;  Location: [facility] EP LAB;  Service: Cardiology;  Laterality: N/A;  AVB, MICRA, EH, [place] [address]       Review of patient's allergies indicates:  No Known Allergies    Family History       Problem Relation (Age of Onset)    Cancer Brother    Diabetes Mother, Sister    Heart disease Mother    Stroke Sister            Tobacco Use    Smoking status: Never    Smokeless tobacco: Never   Substance and Sexual Activity    Alcohol use: No     Comment: occassional    Drug use: No    Sexual activity: Not Currently       Review of Systems   Constitutional:  Negative for activity change, chills and fever.   Respiratory:  Negative for shortness of breath.    Cardiovascular:  Negative for chest pain.   Gastrointestinal:  Negative for abdominal pain, diarrhea, nausea and vomiting.   Genitourinary:  Positive for difficulty urinating.   Neurological:  Negative for dizziness and weakness.       Objective:     Temp:  [98.5 °F (36.9 °C)-99.9 °F (37.7 °C)] 99.9 °F (37.7 °C)  Pulse:  [] 112  Resp:  [13-25] 20  SpO2:  [95 %-100 %] 99 %  BP: (126-148)/(48-69) 127/58  Weight: 90.7 kg (200 lb)  Body mass index is 32.28 kg/m².           Drains       Drain  Duration                  Urethral Catheter -- days                     Physical Exam  Vitals reviewed.   Constitutional:       General: He is not  "in acute distress.     Appearance: He is well-developed. He is not diaphoretic.   HENT:      Head: Normocephalic and atraumatic.   Eyes:      General: No scleral icterus.  Cardiovascular:      Rate and Rhythm: Tachycardia present.   Pulmonary:      Effort: Pulmonary effort is normal. No respiratory distress.      Breath sounds: No stridor.   Genitourinary:     Comments: Fung catheter draining yellow urine with sediment after repositioning  Ventral erosion present  Musculoskeletal:      Cervical back: Normal range of motion.   Neurological:      Mental Status: He is alert.   Psychiatric:         Behavior: Behavior normal.          Significant Labs:    BMP:  Recent Labs   Lab 05/08/24 2115 05/09/24 0523    142   K 3.8 3.8    102   CO2 28 28   BUN 13 11   CREATININE 1.0 0.8   CALCIUM 8.9 8.7       CBC:  Recent Labs   Lab 05/08/24 2118 05/09/24 0523   WBC 14.24* 15.84*   HGB 7.5* 7.9*   HCT 24.8* 26.8*   * 582*       Blood Culture:   Recent Labs   Lab 05/08/24 2110 05/08/24 2115   LABBLOO No Growth to date No Growth to date     Urine Culture: No results for input(s): "LABURIN" in the last 168 hours.  Urine Studies:   Recent Labs   Lab 05/08/24 2121   COLORU Yellow   APPEARANCEUA Cloudy*   PHUR 7.0   SPECGRAV 1.020   PROTEINUA 2+*   GLUCUA Negative   KETONESU Negative   BILIRUBINUA Negative   OCCULTUA 2+*   NITRITE Negative   UROBILINOGEN 2.0-3.0*   LEUKOCYTESUR 3+*   RBCUA 37*   WBCUA >100*   BACTERIA Occasional   HYALINECASTS 0       Significant Imaging:  All pertinent imaging results/findings from the past 24 hours have been reviewed.                    Assessment and Plan:     Urinary retention  Saji Castañeda is a 73 yo M with a history of AF, DM, HLD, HLD, osteomyelitis presently admitted for leukocytosis. Urology was consulted for retention.  - will arrange outpatient follow up for voiding trial and discussion of management of ongoing urinary retention, likely SPT vs indwelling fung vs " CIC  - patient not likely a candidate for outlet procedure given most recent A1C        VTE Risk Mitigation (From admission, onward)           Ordered     apixaban tablet 5 mg  2 times daily         05/09/24 0248     IP VTE HIGH RISK PATIENT  Once         05/09/24 0219                    Patel Aceves MD  Urology  [facility] Surg

## 2024-05-09 NOTE — PLAN OF CARE
Pt reported pain during shift. PRN oxy administered. Diabetic diet. Pt is on RA. Wound care performed. Waffle mattress on bed. Sams in place with yellow cloudy urine. Heart and glucose monitoring. IV Antibx and care administered as ordered. Call bell within reach.

## 2024-05-10 PROBLEM — S31.000A WOUND OF SACRAL REGION: Status: ACTIVE | Noted: 2024-05-10

## 2024-05-10 PROBLEM — E11.9 DIABETES MELLITUS: Status: ACTIVE | Noted: 2024-05-10

## 2024-05-10 PROBLEM — D72.829 LEUKOCYTOSIS: Status: ACTIVE | Noted: 2024-05-10

## 2024-05-10 LAB
ABO GROUP BLD: NORMAL
ALBUMIN SERPL BCP-MCNC: 1.7 G/DL (ref 3.5–5.2)
ALP SERPL-CCNC: 95 U/L (ref 55–135)
ALT SERPL W/O P-5'-P-CCNC: 26 U/L (ref 10–44)
ANION GAP SERPL CALC-SCNC: 16 MMOL/L (ref 8–16)
AST SERPL-CCNC: 36 U/L (ref 10–40)
BASOPHILS # BLD AUTO: 0.02 K/UL (ref 0–0.2)
BASOPHILS NFR BLD: 0.1 % (ref 0–1.9)
BILIRUB SERPL-MCNC: 0.3 MG/DL (ref 0.1–1)
BLD GP AB SCN CELLS X3 SERPL QL: NORMAL
BUN SERPL-MCNC: 12 MG/DL (ref 8–23)
CALCIUM SERPL-MCNC: 8.6 MG/DL (ref 8.7–10.5)
CHLORIDE SERPL-SCNC: 99 MMOL/L (ref 95–110)
CO2 SERPL-SCNC: 23 MMOL/L (ref 23–29)
CREAT SERPL-MCNC: 0.9 MG/DL (ref 0.5–1.4)
DIFFERENTIAL METHOD BLD: ABNORMAL
EOSINOPHIL # BLD AUTO: 1 K/UL (ref 0–0.5)
EOSINOPHIL NFR BLD: 5.5 % (ref 0–8)
ERYTHROCYTE [DISTWIDTH] IN BLOOD BY AUTOMATED COUNT: 17.7 % (ref 11.5–14.5)
EST. GFR  (NO RACE VARIABLE): >60 ML/MIN/1.73 M^2
GLUCOSE SERPL-MCNC: 111 MG/DL (ref 70–110)
HCT VFR BLD AUTO: 25.4 % (ref 40–54)
HGB BLD-MCNC: 7.4 G/DL (ref 14–18)
IMM GRANULOCYTES # BLD AUTO: 0.07 K/UL (ref 0–0.04)
IMM GRANULOCYTES NFR BLD AUTO: 0.4 % (ref 0–0.5)
LYMPHOCYTES # BLD AUTO: 0.8 K/UL (ref 1–4.8)
LYMPHOCYTES NFR BLD: 4.5 % (ref 18–48)
MCH RBC QN AUTO: 23.6 PG (ref 27–31)
MCHC RBC AUTO-ENTMCNC: 29.1 G/DL (ref 32–36)
MCV RBC AUTO: 81 FL (ref 82–98)
MONOCYTES # BLD AUTO: 0.5 K/UL (ref 0.3–1)
MONOCYTES NFR BLD: 3 % (ref 4–15)
NEUTROPHILS # BLD AUTO: 15.3 K/UL (ref 1.8–7.7)
NEUTROPHILS NFR BLD: 86.5 % (ref 38–73)
NRBC BLD-RTO: 0 /100 WBC
PLATELET # BLD AUTO: 560 K/UL (ref 150–450)
PMV BLD AUTO: 9.2 FL (ref 9.2–12.9)
POCT GLUCOSE: 110 MG/DL (ref 70–110)
POCT GLUCOSE: 118 MG/DL (ref 70–110)
POCT GLUCOSE: 123 MG/DL (ref 70–110)
POCT GLUCOSE: 147 MG/DL (ref 70–110)
POTASSIUM SERPL-SCNC: 4.3 MMOL/L (ref 3.5–5.1)
PROT SERPL-MCNC: 6.7 G/DL (ref 6–8.4)
RBC # BLD AUTO: 3.14 M/UL (ref 4.6–6.2)
RH BLD: NORMAL
SODIUM SERPL-SCNC: 138 MMOL/L (ref 136–145)
SPECIMEN OUTDATE: NORMAL
WBC # BLD AUTO: 17.61 K/UL (ref 3.9–12.7)

## 2024-05-10 PROCEDURE — 86850 RBC ANTIBODY SCREEN: CPT | Performed by: STUDENT IN AN ORGANIZED HEALTH CARE EDUCATION/TRAINING PROGRAM

## 2024-05-10 PROCEDURE — 21400001 HC TELEMETRY ROOM

## 2024-05-10 PROCEDURE — 36415 COLL VENOUS BLD VENIPUNCTURE: CPT | Performed by: STUDENT IN AN ORGANIZED HEALTH CARE EDUCATION/TRAINING PROGRAM

## 2024-05-10 PROCEDURE — 85025 COMPLETE CBC W/AUTO DIFF WBC: CPT | Performed by: STUDENT IN AN ORGANIZED HEALTH CARE EDUCATION/TRAINING PROGRAM

## 2024-05-10 PROCEDURE — 63600175 PHARM REV CODE 636 W HCPCS: Performed by: INTERNAL MEDICINE

## 2024-05-10 PROCEDURE — 86900 BLOOD TYPING SEROLOGIC ABO: CPT | Performed by: STUDENT IN AN ORGANIZED HEALTH CARE EDUCATION/TRAINING PROGRAM

## 2024-05-10 PROCEDURE — 25000003 PHARM REV CODE 250: Performed by: INTERNAL MEDICINE

## 2024-05-10 PROCEDURE — 80053 COMPREHEN METABOLIC PANEL: CPT | Performed by: STUDENT IN AN ORGANIZED HEALTH CARE EDUCATION/TRAINING PROGRAM

## 2024-05-10 PROCEDURE — 86901 BLOOD TYPING SEROLOGIC RH(D): CPT | Performed by: STUDENT IN AN ORGANIZED HEALTH CARE EDUCATION/TRAINING PROGRAM

## 2024-05-10 PROCEDURE — 25000003 PHARM REV CODE 250: Performed by: STUDENT IN AN ORGANIZED HEALTH CARE EDUCATION/TRAINING PROGRAM

## 2024-05-10 RX ORDER — INSULIN ASPART 100 [IU]/ML
0-10 INJECTION, SOLUTION INTRAVENOUS; SUBCUTANEOUS
COMMUNITY

## 2024-05-10 RX ORDER — CLONIDINE HYDROCHLORIDE 0.1 MG/1
0.1 TABLET ORAL 3 TIMES DAILY
Status: ON HOLD | COMMUNITY
End: 2024-05-13 | Stop reason: HOSPADM

## 2024-05-10 RX ORDER — FERROUS SULFATE 325(65) MG
325 TABLET, DELAYED RELEASE (ENTERIC COATED) ORAL 2 TIMES DAILY
COMMUNITY

## 2024-05-10 RX ORDER — ACETAMINOPHEN 325 MG/1
650 TABLET ORAL EVERY 6 HOURS PRN
Status: DISCONTINUED | OUTPATIENT
Start: 2024-05-10 | End: 2024-05-13 | Stop reason: HOSPADM

## 2024-05-10 RX ORDER — ACETAMINOPHEN 500 MG
5000 TABLET ORAL WEEKLY
Status: ON HOLD | COMMUNITY
End: 2024-05-23 | Stop reason: HOSPADM

## 2024-05-10 RX ADMIN — GABAPENTIN 300 MG: 300 CAPSULE ORAL at 08:05

## 2024-05-10 RX ADMIN — FUROSEMIDE 20 MG: 20 TABLET ORAL at 08:05

## 2024-05-10 RX ADMIN — ISOSORBIDE DINITRATE 10 MG: 10 TABLET ORAL at 02:05

## 2024-05-10 RX ADMIN — OXYCODONE HYDROCHLORIDE AND ACETAMINOPHEN 500 MG: 500 TABLET ORAL at 08:05

## 2024-05-10 RX ADMIN — ISOSORBIDE DINITRATE 10 MG: 10 TABLET ORAL at 08:05

## 2024-05-10 RX ADMIN — OXYCODONE HYDROCHLORIDE AND ACETAMINOPHEN 1 TABLET: 5; 325 TABLET ORAL at 09:05

## 2024-05-10 RX ADMIN — APIXABAN 5 MG: 5 TABLET, FILM COATED ORAL at 08:05

## 2024-05-10 RX ADMIN — NIFEDIPINE 90 MG: 60 TABLET, FILM COATED, EXTENDED RELEASE ORAL at 08:05

## 2024-05-10 RX ADMIN — INSULIN ASPART 5 UNITS: 100 INJECTION, SOLUTION INTRAVENOUS; SUBCUTANEOUS at 08:05

## 2024-05-10 RX ADMIN — THERA TABS 1 TABLET: TAB at 08:05

## 2024-05-10 RX ADMIN — MUPIROCIN: 20 OINTMENT TOPICAL at 08:05

## 2024-05-10 RX ADMIN — VANCOMYCIN HYDROCHLORIDE 1250 MG: 1.25 INJECTION, POWDER, LYOPHILIZED, FOR SOLUTION INTRAVENOUS at 05:05

## 2024-05-10 RX ADMIN — TAMSULOSIN HYDROCHLORIDE 0.4 MG: 0.4 CAPSULE ORAL at 08:05

## 2024-05-10 RX ADMIN — ATORVASTATIN CALCIUM 80 MG: 40 TABLET, FILM COATED ORAL at 08:05

## 2024-05-10 RX ADMIN — CEFEPIME 1 G: 1 INJECTION, POWDER, FOR SOLUTION INTRAMUSCULAR; INTRAVENOUS at 12:05

## 2024-05-10 RX ADMIN — INSULIN DETEMIR 9 UNITS: 100 INJECTION, SOLUTION SUBCUTANEOUS at 08:05

## 2024-05-10 RX ADMIN — ACETAMINOPHEN 650 MG: 325 TABLET ORAL at 02:05

## 2024-05-10 RX ADMIN — GABAPENTIN 300 MG: 300 CAPSULE ORAL at 02:05

## 2024-05-10 RX ADMIN — INSULIN ASPART 5 UNITS: 100 INJECTION, SOLUTION INTRAVENOUS; SUBCUTANEOUS at 12:05

## 2024-05-10 RX ADMIN — CEFEPIME 1 G: 1 INJECTION, POWDER, FOR SOLUTION INTRAMUSCULAR; INTRAVENOUS at 08:05

## 2024-05-10 RX ADMIN — CEFEPIME 1 G: 1 INJECTION, POWDER, FOR SOLUTION INTRAMUSCULAR; INTRAVENOUS at 04:05

## 2024-05-10 RX ADMIN — INSULIN ASPART 5 UNITS: 100 INJECTION, SOLUTION INTRAVENOUS; SUBCUTANEOUS at 05:05

## 2024-05-10 RX ADMIN — PANTOPRAZOLE SODIUM 40 MG: 40 TABLET, DELAYED RELEASE ORAL at 08:05

## 2024-05-10 NOTE — NURSING
Pt with 5 beat run of vtach, a break, and then another 9 beat run of vtach. Pt asymptomatic. /55, , O2 96%. MD notified.

## 2024-05-10 NOTE — PHARMACY MED REC
"[facility]           Pharmacy  Admission Medication Reconciliation     Based on information gathered for medication list, you may go to "Admission" then "Reconcile Home Medications" tabs to review and/or act upon those items.     The home medication list has been updated by the Pharmacy department.   Please read ALL comments highlighted in red.   Please address this information as you see fit.    Feel free to contact us if you have any questions or require assistance.    Home medication list has been compared to current inpatient medications. Please review the following discrepancies noted below:    Patient reports NO LONGER TAKING the following medication(s) which has been ordered upon admit.  Isosorbide dinitrate  Patient reports STILL TAKING the following medication(s) which was not ordered upon admit  Clonidine  Clopidogrel  Nifedipine  Patient reports taking a DIFFERENT dose,route, or frequency is listed  than how ordered upon admit  Gabapentin    Feel free to contact us if you have any questions or require assistance.    Va Phelan, PharmD  [phone]        "

## 2024-05-10 NOTE — PLAN OF CARE
Aaox4. Medications administered per MAR. Tele monitored. Glucose monitored. Sams in place. Wound care performed. Safety precautions maintained. Call bell within reach.

## 2024-05-10 NOTE — PLAN OF CARE
VIRTUAL NURSE: Labs, notes, orders, and careplan reviewed. VN to be available as needed.     Problem: Adult Inpatient Plan of Care  Goal: Plan of Care Review  Outcome: Progressing

## 2024-05-10 NOTE — PLAN OF CARE
Patient is AAOx4. Tolerated diabetic diet. Turned q2 on bed . IV abx maintained. Heart and Glucose monitoring continued. PRN pain meds provided for complains of pain. Instructed to use call light for assistance. Dressing on sacrum CDI. Bed alarm set and maintained at lowest position. Call light within reach. Sams CDI. CHG given.

## 2024-05-10 NOTE — PLAN OF CARE
Recommendations  Recommendation:   1. Continue diabetic diet 2000 kcals   2. Add cardiac diet restriction   3. Add Boost Glucose Control BID  4. Monitor weight and labs   5. Encourage PO intake at meals    Goals: Pt will consume 50-75% of meals by RD follow up    Nutrition Goal Status: new  Communication of RD Recs:  (POC)

## 2024-05-10 NOTE — PROGRESS NOTES
[facility] Medicine Progress Note    Primary Team: [facility] Hospitalist Team B  Attending Physician: Kumar Valdes MD  Resident: Melony  Intern: Iwona    Subjective:      Patient comfortably eating breakfast and in no distress.  Endorses pain well-controlled.  Wound care assisting with sacral ulcers.  No complaints at this time.     Objective:     Last 24 Hour Vital Signs:  BP  Min: 105/54  Max: 127/58  Temp  Av.9 °F (37.2 °C)  Min: 98 °F (36.7 °C)  Max: 99.9 °F (37.7 °C)  Pulse  Av.6  Min: 68  Max: 114  Resp  Av.4  Min: 18  Max: 20  SpO2  Av.4 %  Min: 95 %  Max: 100 %  Weight  Av.9 kg (209 lb 3.5 oz)  Min: 94.9 kg (209 lb 3.5 oz)  Max: 94.9 kg (209 lb 3.5 oz)  I/O last 3 completed shifts:  In: 3329.4 [P.O.:720; IV Piggyback:2609.4]  Out: 1775 [Urine:1775]    Physical Examination:  Physical Exam  Constitutional:       General: He is not in acute distress.  HENT:      Head: Normocephalic and atraumatic.      Mouth/Throat:      Mouth: Mucous membranes are moist.      Pharynx: Oropharynx is clear.   Eyes:      Extraocular Movements: Extraocular movements intact.      Conjunctiva/sclera: Conjunctivae normal.   Cardiovascular:      Rate and Rhythm: Normal rate and regular rhythm.   Pulmonary:      Effort: No respiratory distress.      Breath sounds: Normal breath sounds.   Abdominal:      General: Abdomen is flat.      Palpations: Abdomen is soft.   Musculoskeletal:      Right lower leg: Edema present.      Comments: L AKA with healed site and no drainage or purulence   Skin:     General: Skin is warm.      Capillary Refill: Capillary refill takes less than 2 seconds.      Coloration: Skin is not jaundiced; Stage III ulcers on sacral region and buttocks, not worsened since admission.  Neurological:      Mental Status: He is alert and oriented to person, place, and time.      Comments: 3/5 strength in RLE upon flexion and extension   Psychiatric:         Mood and Affect: Mood normal.          Behavior: Behavior normal.       Laboratory:  Laboratory Data Reviewed: yes  Pertinent Findings:  WBC 14.2-->15.8-->17.6  Hgb 7.4    Microbiology Data Reviewed: yes  Pertinent Findings:  Blood cx NGTD x 2 days  Urine Cx: prelim GNR      Radiology Data Reviewed: yes  Pertinent Findings:    X-Ray Tibia Fibula 2 View Right  Status: Final result     MyChart Results Release    MyChart Status: Pending  Results Release     PACS Images for [facility] Viewer     Show images for X-Ray Tibia Fibula 2 View Right  X-Ray Tibia Fibula 2 View Right  Order: [id number]  Status: Final result       Visible to patient: No (inaccessible in Patient Portal)       Next appt: 05/21/2024 at 08:20 AM in Urology (Candy Goff NP)       Dx: Sepsis, due to unspecified organism, ...    0 Result Notes        Narrative & Impression  EXAMINATION:  XR TIBIA FIBULA 2 VIEW RIGHT     CLINICAL HISTORY:  Rule out infection;  Sepsis, unspecified organism     TECHNIQUE:  AP and lateral views of the right tibia and fibula were performed.     COMPARISON:  Prior dated 10/3/23     FINDINGS:  There is stable deformity in the mid fibular shaft consistent with remote fracture.  Fracture line remains visible but there is evidence of callus formation.  There is joint space narrowing involving the medial and lateral compartments of the knee and the tibiotalar joint consistent with osteoarthritis.  There is diffuse periosteal reaction at the distal tibia and fibula which could be due to venous stasis or hypertrophic osteoarthropathy.  This is unchanged.     There is soft tissue swelling.  Vascular calcifications are present.     Impression:     1. No significant change in the appearance of the tibia or fibula.  There is chronic stable periosteal reaction similar to prior exam and the contralateral lower extremity prior exam.  This may be due to venous stasis or hypertrophic osteoarthropathy.  However due to the degree of periosteal reaction acute infectious  process cannot be entirely excluded.  No erosions.  2. Stable remote fracture of the mid fibula.  3. Soft tissue swelling and vascular calcifications.       X-Ray Foot 2 View Right  Status: Final result     MyChart Results Release    MyChart Status: Pending  Results Release     PACS Images for [facility] Viewer     Show images for X-Ray Foot 2 View Right  X-Ray Foot 2 View Right        0 Result Notes  Details      Narrative & Impression  EXAMINATION:  XR FOOT 2 VIEW RIGHT     CLINICAL HISTORY:  Evaluation for infection in patient with L AKA due to osteomyelitis;  Sepsis, unspecified organism     TECHNIQUE:  Three views of the right foot were obtained     COMPARISON:  Prior dated 10/03/2023     FINDINGS:  There is diffuse osteopenia, worse from prior study.  There are hammertoe deformities of the 2nd, 3rd, and 4th digits.  There is marked deformity of the 5th metatarsal which is unchanged and likely related to remote trauma.  No acute erosions are identified.  There is degenerative change of the midfoot.  There is calcaneal spurring.  There has been no significant change from prior exam.     Impression:     1. No convincing evidence of acute erosion, noting that sensitivity is somewhat limited by severe osteopenia which is worse when compared with the prior exam dated 10/03/2023  2. Stable hammertoe deformities and 5th metatarsal deformity likely related to remote trauma  3. Vascular calcifications  4. Soft tissue swelling              Current Medications:     Infusions:       Scheduled:   apixaban  5 mg Oral BID    ascorbic acid (vitamin C)  500 mg Oral BID    atorvastatin  80 mg Oral Daily    ceFEPime IV (PEDS and ADULTS)  1 g Intravenous Q8H    furosemide  20 mg Oral Daily    gabapentin  300 mg Oral TID    insulin aspart U-100  5 Units Subcutaneous TID WM    insulin detemir U-100  9 Units Subcutaneous BID    isosorbide dinitrate  10 mg Oral TID    multivitamin  1 tablet Oral Daily    mupirocin   Nasal BID     NIFEdipine  90 mg Oral Daily    pantoprazole  40 mg Oral Daily    tamsulosin  0.4 mg Oral Daily    vancomycin (VANCOCIN) IV (PEDS and ADULTS)  1,250 mg Intravenous Q12H        PRN:    Current Facility-Administered Medications:     acetaminophen, 650 mg, Oral, Q6H PRN    dextrose 10%, 12.5 g, Intravenous, PRN    dextrose 10%, 12.5 g, Intravenous, PRN    dextrose 10%, 25 g, Intravenous, PRN    dextrose 10%, 25 g, Intravenous, PRN    glucagon (human recombinant), 1 mg, Intramuscular, PRN    glucagon (human recombinant), 1 mg, Intramuscular, PRN    glucose, 16 g, Oral, PRN    glucose, 16 g, Oral, PRN    glucose, 24 g, Oral, PRN    glucose, 24 g, Oral, PRN    insulin aspart U-100, 0-5 Units, Subcutaneous, QID (AC + HS) PRN    naloxone, 0.4 mg, Intravenous, PRN    oxyCODONE-acetaminophen, 1 tablet, Oral, Q8H PRN    sodium chloride 0.9%, 10 mL, Intravenous, Q12H PRN    Pharmacy to dose Vancomycin consult, , , Once **AND** vancomycin - pharmacy to dose, , Intravenous, pharmacy to manage frequency    Antibiotics and Day Number of Therapy:  Vanc, Cefepime day 2        Assessment:     Saji Castañeda is a 74 y.o. -American male who has a past medical history of DMII, HLD, HTN, Left AKA, PAD, Microcytic Anemia, Pacemaker for 3rd degree heart block. The patient presented to [facility] on 5/8/2024 with a primary complaint of elevated white blood cell count and cloudy output from indwelling Fung catheter site.     Plan:      Sepsis of unknown source  - evaluation of systems make it likely due to Urosepsis, particularly in setting of Fung catheter and cloudy, thick appearance of urine in catheter  - Met SIRS criteria with WBC of 14k with RR of 22  - afebrile on presentation  - UA remarkable for leukocytes and urobilinogen  - received Rocephin in ED  - Urine cx from May 2023 with Klebsiella near pan-susceptible  - consulted Urology and they are assessing potential follow up and whether to continue fung  catheter or alternative  - Abd/pelvic CT obtained to ensure no obstruction or concerning lesions: revealing of bladder wall thickening and bilateral inguinal lymphadenopathy  -  imaging of RLE to ensure no infectious process and no evidence to suggest infection  - ; blood cx NGTD x 2days; Urine cx prelim: GNR  - likely discontinue Vancomycin; continue cefepime: infectious Diseases consult for potential ESBL UTI history and antibiotic choice in setting of climbing WBC     DMII  - A1c of >14 in 3/2024  - hospitalized for DKA around this time and evaluated by Endocrinology for improved glucose control  - regimen of Levemir 12u BID and Aspart 5u with meals  - Levemir not on formulary, so will provide Glargine instead along with Aspart  - Diabetic diet  - sugars appropriately controlled at this time     HLD   - on Rosuvastatin 40 at home: not on formulary, so will provide Atorvastatin     HTN  - continue home regimen of isosorbide dinitrate 10 TID, nifedipine 90  - additional benefit from home Lasix 20  - blood pressure well-controlled at this time     Left AKA from chronic osteomyelitis  - site stable  - imaging of RLE as described above     PAD  - lipid control as above     RUE DVT  - discovered roughly 2 months ago  - contiue home regimen of Eliquis 5 BID     Pacemaker for 3rd degree heart block  - pacer in place for pronounced bradycardia  - will monitor on telemetry, particularly in setting of likely concurrent atrial fibrillation        Code: Full per patient  DVT ppx: on Eliquis  Diet: Diabetic  Dispo: 2-3 days for full workup and infectious treatment     Sky Ty MD  [facility] Internal Medicine HO-III     [facility] Medicine Hospitalist Pager numbers:   [facility] Hospitalist Medicine Team A (Lorne/Riki):[phone]  [facility] Hospitalist Medicine Team B (Arik/Lucio):        [phone]

## 2024-05-10 NOTE — CONSULTS
[facility] Infectious Diseases Consult Note    Primary Attending Physician: Kumar Valdes MD     Reason for Consult:     Leukocytosis with cloudy urine and positive urine culture    Assessment (see problem list below):     Saji Castañeda is a 74 y.o. male with:  Leukocytosis with cloudy urine and positive urine culture.  Presumed to be urinary tract infection although patient has history of chronic wounds as well.  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  Sacral wounds   Chronic Sams with particulate matter in the urine.  Catheter does not seem to have been replaced since admission.   Diabetes, hypertension, hyperlipidemia, pacemaker chronic Sams      Plan:     Change out Sams catheter if at all possible.  Wound care as you were doing  Consider arterial workup of right lower extremity to determine need for intervention and potential for wound healing  Follow up urine culture and adjust antibiotics based on identification susceptibility  Length of Therapy depending upon response  Monitor patient progress    Thank you for allowing us to participate in the care of this patient. Please contact me if you have any questions regarding this consult.    Sameer Gayle  [facility] ID  Pager: [phone]    Subjective:      History of Present Illness:  Saji Castañeda is a 74 y.o. male with diabetes, hypertension, hyperlipidemia, peripheral artery disease, lower extremity wounds with left AKA, pacemaker, chronic Sams with urinary tract infections presents with cloudy urine and leukocytosis.    03/05/2024 patient was admitted for hyperglycemia.  Glucose greater than 500.  Patient was also noted to have peripheral artery disease and multiple wounds.  Course consisted of left leg wound culture with Proteus mirabilis and Enterobacter cloacae and  chronic wounds.  Fungal culture also grew fusarium.  Patient was evaluated and recommended amputation which he refused.  Patient was recommended to continue ertapenem for a total of 6 weeks by Infectious Disease.  Voriconazole was also added.  03/27/2024 patient had left AKA performed.  Patient had low-grade postoperative fever thought due to atelectasis versus Hap.  Patient was treated with vancomycin and cefepime and eventually discharged on 04/04/2024 to nursing home.    04/17/2024 patient had follow up in vascular surgery showing good healing of left AKA site.  Also noted to have right foot wound.    05/08/2024 patient was transferred from nursing home to [place] emergency department because of cloudy urine output and elevated white count.  Upon evaluation, the patient was afebrile.  WBC 14.24.  Patient was initially started on vancomycin, ceftriaxone and converted to vancomycin and cefepime.  Blood cultures were obtained and are negative so far.  Urine culture with Gram-negative rods.    Infectious disease was consulted for antibiotic selection patient with urinary tract infection and prior history of ESBL    Review of positive cultures:  Left leg wound culture 03/06/2024 Proteus mirabilis Enterobacter cloacae, fusarium, Candida albicans  Left foot wound 10/03/2023 Candida albicans achromobacter  Blood cultures 10/02/2023 MDRO Acinetobacter ESBL Klebsiella pneumoniae coag-negative staph  Right foot wound culture 05/16/2023 Pseudomonas aeruginosa Klebsiella pneumoniae  Right foot wound culture 05/16/2023 Proteus mirabilis Klebsiella pneumoniae Proteus   Urine culture 05/15/2023 Klebsiella pneumoniae  Left wound leg culture 03/10/2023 Proteus mirabilis  Blood culture 03/09/2023 coag-negative staph  Left foot wound culture 07/20/2022 Enterobacter cloacae  Left foot bone culture 06/06/2022 staph epidermidis  Left foot wound culture 06/16/2022 MRSA Streptococcus agalactiae anaerobes  Urine culture 04/22/2022 ESBL E  coli  Wound culture 02/09/2022 Proteus mirabilis Pseudomonas aeruginosa  Wound culture 02/23/2021 diphtheroids  Left leg wound culture 12/09/2020 Proteus mirabilis anaerobes  Left foot bone culture 12/09/2020 Proteus mirabilis     Past Medical History:  Past Medical History:   Diagnosis Date    Arthritis     legs    Bacteremia due to Gram-negative bacteria 3/23/2021    Diabetes mellitus     Diabetes mellitus, type 2     Hyperlipidemia     Hypertension     Osteomyelitis     Palliative care encounter 5/24/2023       Past Surgical History:  Past Surgical History:   Procedure Laterality Date    ABOVE-KNEE AMPUTATION Left 3/27/2024    Procedure: AMPUTATION, ABOVE KNEE;  Surgeon: Adal Arellano MD;  Location: [facility] OR [address] [place] [address];  Service: Vascular;  Laterality: Left;    ANGIOGRAPHY OF LOWER EXTREMITY N/A 2/3/2021    Procedure: Angiogram Extremity Bilateral;  Surgeon: Ernst Chacko MD;  Location: [facility] OR [address] [place] [address];  Service: Peripheral Vascular;  Laterality: N/A;  7.4 mintues fluoroscopy time  816.15 mGy  170.17 Gycm2    AORTOGRAPHY WITH EXTREMITY RUNOFF Bilateral 2/3/2021    Procedure: AORTOGRAM, WITH EXTREMITY RUNOFF;  Surgeon: Ernst Chacko MD;  Location: [facility] OR [address] [place] [address];  Service: Peripheral Vascular;  Laterality: Bilateral;    DEBRIDEMENT OF FOOT Left 2/23/2021    Procedure: DEBRIDEMENT, LEFT HEEL;  Surgeon: Mayra Schroeder DPM;  Location: [facility] OR [address] [place] [address];  Service: Podiatry;  Laterality: Left;    FOOT AMPUTATION  October 2010    left high midfoot amputation    IMPLANTATION OF LEADLESS PACEMAKER N/A 10/12/2023    Procedure: [id number];  Surgeon: VANESSA Delatorre MD;  Location: [facility] EP LAB;  Service: Cardiology;  Laterality: N/A;  AVB, MICRA, EH, [place]ES, [place] [id number]       Allergies:  Review of patient's allergies indicates:  No Known Allergies    Medications:  Reviewed  Antibiotics  Cefepime  Vancomycin  Family History:  Family History   Problem Relation Name Age of Onset  "   Diabetes Mother      Heart disease Mother      Stroke Sister      Cancer Brother      Diabetes Sister         Social History:  Social History     Tobacco Use    Smoking status: Never    Smokeless tobacco: Never   Substance Use Topics    Alcohol use: No     Comment: occassional    Drug use: No     Review of Systems   Musculoskeletal:         Right foot pain and numbness.  Left leg amputation without issues.  Wound on his sacral area.   Neurological:  Positive for weakness.   All other systems reviewed and are negative.      Objective:   Last 24 Hour Vital Signs:  BP  Min: 105/57  Max: 125/58  Temp  Av.2 °F (37.3 °C)  Min: 98 °F (36.7 °C)  Max: 100.1 °F (37.8 °C)  Pulse  Av.8  Min: 68  Max: 108  Resp  Av  Min: 18  Max: 22  SpO2  Av.3 %  Min: 95 %  Max: 100 %  Height  Av' 6" (167.6 cm)  Min: 5' 6" (167.6 cm)  Max: 5' 6" (167.6 cm)  Weight  Av.9 kg (209 lb 3.5 oz)  Min: 94.9 kg (209 lb 3.5 oz)  Max: 94.9 kg (209 lb 3.5 oz)  I/O last 3 completed shifts:  In: 3329.4 [P.O.:720; IV Piggyback:2609.4]  Out: 1775 [Urine:1775]    Physical Exam  Vitals and nursing note reviewed.   HENT:      Head: Normocephalic and atraumatic.      Nose: Nose normal. No congestion or rhinorrhea.      Mouth/Throat:      Mouth: Mucous membranes are moist.      Pharynx: Oropharynx is clear. No oropharyngeal exudate.   Eyes:      Conjunctiva/sclera: Conjunctivae normal.      Pupils: Pupils are equal, round, and reactive to light.   Cardiovascular:      Rate and Rhythm: Tachycardia present.      Heart sounds: No murmur heard.     No friction rub. No gallop.      Comments: Distant heart sounds.  Decreased pedal pulse on his right leg.  Pulmonary:      Comments: Decreased breath sounds at the bases.  Abdominal:      General: Bowel sounds are normal. There is no distension.      Palpations: Abdomen is soft.      Comments: Mild epigastric tenderness on palpation.  No suprapubic tenderness noted.   Genitourinary:     " Comments: Sams with particular matter noted.  Sacral wounds extensive but clean.  Noted photographs  Musculoskeletal:      Cervical back: Neck supple.      Right lower leg: Edema present.      Comments: Right lower extremity with venous stasis and arterial changes.  Necrotic right heel without evidence drainage.  No pathology noted above the knee.  Noted photographs of wounds.  Left stump healing well without evidence of infection.  Upper extremities without evidence of infection   Lymphadenopathy:      Cervical: No cervical adenopathy.   Skin:     Findings: Erythema and lesion present.   Neurological:      Mental Status: He is alert.      Comments: Somewhat confused but interactive.  Does not know why he is in the hospital.  Generalized weakness.  Right foot numbness         Devices:  Peripheral IV  Sams catheter    Laboratory Results: reviewed  Most Recent Data:  CBC:   Lab Results   Component Value Date    WBC 17.61 (H) 05/10/2024    HGB 7.4 (L) 05/10/2024    HCT 25.4 (L) 05/10/2024     (H) 05/10/2024    MCV 81 (L) 05/10/2024    RDW 17.7 (H) 05/10/2024     BMP:   Lab Results   Component Value Date     05/10/2024    K 4.3 05/10/2024    CL 99 05/10/2024    CO2 23 05/10/2024    BUN 12 05/10/2024     (H) 05/10/2024    CALCIUM 8.6 (L) 05/10/2024    MG 2.0 05/08/2024    PHOS 3.0 05/08/2024     LFTs:   Lab Results   Component Value Date    PROT 6.7 05/10/2024    ALBUMIN 1.7 (L) 05/10/2024    BILITOT 0.3 05/10/2024    AST 36 05/10/2024    ALKPHOS 95 05/10/2024    ALT 26 05/10/2024     Urinalysis:   Lab Results   Component Value Date    LABURIN (A) 05/08/2024     GRAM NEGATIVE TODD  >100,000 cfu/ml  Identification and susceptibility pending      COLORU Yellow 05/08/2024    CLARITYU Slightly Cloudy 04/22/2022    SPECGRAV 1.020 05/08/2024    NITRITE Negative 05/08/2024    KETONESU Negative 05/08/2024    UROBILINOGEN 2.0-3.0 (A) 05/08/2024       Trended Lab Data:  Recent Labs   Lab 05/08/24 2115  05/08/24 2118 05/09/24  0523 05/10/24  0515   WBC  --  14.24* 15.84* 17.61*   HGB  --  7.5* 7.9* 7.4*   HCT  --  24.8* 26.8* 25.4*   PLT  --  548* 582* 560*   MCV  --  80* 81* 81*   RDW  --  17.6* 17.7* 17.7*     --  142 138   K 3.8  --  3.8 4.3     --  102 99   CO2 28  --  28 23   BUN 13  --  11 12   *  --  162* 111*   PROT 7.0  --  7.1 6.7   ALBUMIN 1.8*  --  1.8* 1.7*   BILITOT 0.3  --  0.2 0.3   AST 24  --  28 36   ALKPHOS 78  --  83 95   ALT 23  --  24 26       Microbiology Data:  Urine culture with Gram-negative mikey  Blood culture no growth    Other Results:    Radiology:  Reviewed  Right foot and leg x-ray 05/09/2024 with evidence of osteopenia and soft tissue swelling  Abdominal pelvis CT 05/09/2024 with constipation, cholelithiasis, Sams catheter within the prostate, inguinal adenopathy  Problem List  Patient Active Problem List   Diagnosis    Edema of leg    Tinea pedis    Wound, open, foot with complication    Diabetic neuropathy    Essential hypertension    Insulin dependent type 2 diabetes mellitus    Cataract cortical, senile    YNES (acute kidney injury)    Anemia of chronic disease    Asymptomatic Mobitz (type) I (Wenckebach's) atrioventricular block    Peripheral arterial disease    PVD (peripheral vascular disease)    Cellulitis of left lower leg    Osteomyelitis of left lower extremity    Pressure injury of left heel, stage 4    Morbid obesity    Iron deficiency anemia    Leg swelling    Foot pain, left    Chronic deep vein thrombosis (DVT) of right upper extremity    Nonhealing ulcer of right lower leg with fat layer exposed    Hypergranulation    Subacute osteomyelitis of left foot    Osteomyelitis of right lower extremity    Chronic osteomyelitis    Hyperglycemia due to diabetes mellitus    AV block, 2nd degree    Paroxysmal atrial fibrillation    History of complete heart block    Sepsis    Blurred vision, left eye    Asymptomatic bacteriuria    AVB (atrioventricular block)     Diabetic ketoacidosis without coma associated with type 2 diabetes mellitus    Other hyperlipidemia    Severe sepsis    Stage 3b chronic kidney disease    History of amputation of left high mid foot     Long term current use of insulin    Chronic anticoagulation    Proteus infection    History of Bedbug bite with infection    Localized swelling of right upper extremity    BPH (benign prostatic hyperplasia)    Urinary retention    Edema due to hypervolemia    Fever    Pacemaker    Wound of sacral region    Leukocytosis    Diabetes mellitus     See above for impression and recommendations.

## 2024-05-10 NOTE — NURSING
Patient has a chronic fung cath. The change date of it is not documented. MD asked if okay to change. MD states leave the fung on for now until urology consult is done.

## 2024-05-10 NOTE — CONSULTS
"[facility] - Med Surg  Adult Nutrition  Progress Note    SUMMARY     Recommendations  Recommendation:   1. Continue diabetic diet 2000 kcals   2. Add cardiac diet restriction   3. Add Boost Glucose Control BID  4. Monitor weight and labs   5. Encourage PO intake at meals    Goals: Pt will consume 50-75% of meals by RD follow up    Nutrition Goal Status: new  Communication of RD Recs:  (POC)    Assessment and Plan  Nutrition Problem  Inadequate energy intake    Related to (etiology):   Dx related symptoms     Signs and Symptoms (as evidenced by):   Noted 55 lb weight loss in 5 months (-20 lb weight loss)    Interventions:  Collaboration with other providers   [facility]    Nutrition Diagnosis Status:   New    Malnutrition Assessment  Weight Loss (Malnutrition):  (Noted 55 lb weight loss in 5 months)     Reason for Assessment  Reason For Assessment: consult  Diagnosis: infection/sepsis  Relevant Medical History: arthritis, DM, HLD, osteomyelitis, HTN, left AKA 3/27/24  Interdisciplinary Rounds: did not attend  General Information Comments: Pt is currently on a diabetic diet 2000 kcals. Left AKA 3/27/24. Tomas-13/left buttocks, left dorsal greater trochanter, right back, right posterior heel, right anterior foot, right buttocks, left ischial tuberosity right toe ulcer. noted 25% meal intake. Noted 55 lb weight loss in 5 months. Unable to conduct NFPE at this time.  Nutrition Discharge Planning: d/c on diabetic/cardiac diet    Nutrition Risk Screen  Nutrition Risk Screen: no indicators present    Nutrition/Diet History  Patient Reported Diet/Restrictions/Preferences: diabetic diet, heart healthy  Food Preferences: [place]  Factors Affecting Nutritional Intake: None identified at this time    Nutrition Related Social Determinants of Health: SDOH: Unable to assess at this time.     Anthropometrics  Temp: 100.1 °F (37.8 °C)  Height Method: Stated  Height: 5' 6" (167.6 cm)  Height (inches): 66 in  Weight Method: Bed " Scale  Weight: 94.9 kg (209 lb 3.5 oz)  Weight (lb): 209.22 lb  Ideal Body Weight (IBW), Male: 142 lb  % Ideal Body Weight, Male (lb): 147.34 %  BMI (Calculated): 33.8  BMI Grade: 30 - 34.9- obesity - grade I  Usual Body Weight (UBW), k.7 kg (12/15/23)  % Usual Body Weight: 79.45  % Weight Change From Usual Weight: -20.72 %     Lab/Procedures/Meds  Pertinent Labs Reviewed: reviewed  Pertinent Labs Comments: Ca 8.6, Glucose 111  Pertinent Medications Reviewed: reviewed  Pertinent Medications Comments: ascorbic acid, atorvastatin, furosemide, gabapentin, insulin, multivitamin, pantoprazole, tamsulosin    Estimated/Assessed Needs  Weight Used For Calorie Calculations: 94.9 kg (209 lb 3.5 oz)  Energy Calorie Requirements (kcal): 2372 kcals (25 kcals/kg)  Energy Need Method: Kcal/kg  Protein Requirements: 114g (1.2g/kg)  Weight Used For Protein Calculations: 94.9 kg (209 lb 3.5 oz)     Estimated Fluid Requirement Method: RDA Method  RDA Method (mL): 2372  CHO Requirement: 300g    Nutrition Prescription Ordered  Current Diet Order: Diabetic diet 2000 kcals    Evaluation of Received Nutrient/Fluid Intake  I/O: 1067/1325  Energy Calories Required: not meeting needs  Protein Required: not meeting needs  Fluid Required: not meeting needs  Comments: LBM-24  Tolerance: tolerating  % Intake of Estimated Energy Needs: 25 - 50 %  % Meal Intake: 25 - 50 %    Nutrition Risk  Level of Risk/Frequency of Follow-up:  (1x/weekly)     Monitor and Evaluation  Food and Nutrient Intake: food and beverage intake, energy intake  Food and Nutrient Adminstration: diet order  Anthropometric Measurements: weight, weight change, body mass index  Biochemical Data, Medical Tests and Procedures: electrolyte and renal panel, gastrointestinal profile, inflammatory profile, lipid profile     Nutrition Follow-Up  RD Follow-up?: Yes

## 2024-05-11 LAB
ALBUMIN SERPL BCP-MCNC: 1.4 G/DL (ref 3.5–5.2)
ALP SERPL-CCNC: 87 U/L (ref 55–135)
ALT SERPL W/O P-5'-P-CCNC: 25 U/L (ref 10–44)
ANION GAP SERPL CALC-SCNC: 10 MMOL/L (ref 8–16)
AST SERPL-CCNC: 28 U/L (ref 10–40)
BACTERIA UR CULT: ABNORMAL
BASOPHILS # BLD AUTO: 0.02 K/UL (ref 0–0.2)
BASOPHILS NFR BLD: 0.1 % (ref 0–1.9)
BILIRUB SERPL-MCNC: 0.3 MG/DL (ref 0.1–1)
BUN SERPL-MCNC: 16 MG/DL (ref 8–23)
CALCIUM SERPL-MCNC: 8 MG/DL (ref 8.7–10.5)
CHLORIDE SERPL-SCNC: 100 MMOL/L (ref 95–110)
CO2 SERPL-SCNC: 26 MMOL/L (ref 23–29)
CREAT SERPL-MCNC: 1 MG/DL (ref 0.5–1.4)
DIFFERENTIAL METHOD BLD: ABNORMAL
EOSINOPHIL # BLD AUTO: 1.6 K/UL (ref 0–0.5)
EOSINOPHIL NFR BLD: 6.6 % (ref 0–8)
ERYTHROCYTE [DISTWIDTH] IN BLOOD BY AUTOMATED COUNT: 17.3 % (ref 11.5–14.5)
EST. GFR  (NO RACE VARIABLE): >60 ML/MIN/1.73 M^2
GLUCOSE SERPL-MCNC: 119 MG/DL (ref 70–110)
HCT VFR BLD AUTO: 25.1 % (ref 40–54)
HGB BLD-MCNC: 7.7 G/DL (ref 14–18)
IMM GRANULOCYTES # BLD AUTO: 0.21 K/UL (ref 0–0.04)
IMM GRANULOCYTES NFR BLD AUTO: 0.9 % (ref 0–0.5)
LYMPHOCYTES # BLD AUTO: 1 K/UL (ref 1–4.8)
LYMPHOCYTES NFR BLD: 4.2 % (ref 18–48)
MCH RBC QN AUTO: 24.1 PG (ref 27–31)
MCHC RBC AUTO-ENTMCNC: 30.7 G/DL (ref 32–36)
MCV RBC AUTO: 78 FL (ref 82–98)
MONOCYTES # BLD AUTO: 0.7 K/UL (ref 0.3–1)
MONOCYTES NFR BLD: 2.8 % (ref 4–15)
NEUTROPHILS # BLD AUTO: 20.3 K/UL (ref 1.8–7.7)
NEUTROPHILS NFR BLD: 85.4 % (ref 38–73)
NRBC BLD-RTO: 0 /100 WBC
PLATELET # BLD AUTO: 580 K/UL (ref 150–450)
PMV BLD AUTO: 8.8 FL (ref 9.2–12.9)
POCT GLUCOSE: 128 MG/DL (ref 70–110)
POCT GLUCOSE: 151 MG/DL (ref 70–110)
POCT GLUCOSE: 153 MG/DL (ref 70–110)
POCT GLUCOSE: 189 MG/DL (ref 70–110)
POTASSIUM SERPL-SCNC: 3.5 MMOL/L (ref 3.5–5.1)
PROT SERPL-MCNC: 5.5 G/DL (ref 6–8.4)
RBC # BLD AUTO: 3.2 M/UL (ref 4.6–6.2)
SODIUM SERPL-SCNC: 136 MMOL/L (ref 136–145)
WBC # BLD AUTO: 23.76 K/UL (ref 3.9–12.7)

## 2024-05-11 PROCEDURE — 36415 COLL VENOUS BLD VENIPUNCTURE: CPT | Performed by: STUDENT IN AN ORGANIZED HEALTH CARE EDUCATION/TRAINING PROGRAM

## 2024-05-11 PROCEDURE — 99223 1ST HOSP IP/OBS HIGH 75: CPT | Mod: ,,, | Performed by: INTERNAL MEDICINE

## 2024-05-11 PROCEDURE — 25000003 PHARM REV CODE 250: Performed by: INTERNAL MEDICINE

## 2024-05-11 PROCEDURE — 25000003 PHARM REV CODE 250: Performed by: STUDENT IN AN ORGANIZED HEALTH CARE EDUCATION/TRAINING PROGRAM

## 2024-05-11 PROCEDURE — 25000003 PHARM REV CODE 250

## 2024-05-11 PROCEDURE — 63600175 PHARM REV CODE 636 W HCPCS

## 2024-05-11 PROCEDURE — 85025 COMPLETE CBC W/AUTO DIFF WBC: CPT | Performed by: STUDENT IN AN ORGANIZED HEALTH CARE EDUCATION/TRAINING PROGRAM

## 2024-05-11 PROCEDURE — 63600175 PHARM REV CODE 636 W HCPCS: Performed by: INTERNAL MEDICINE

## 2024-05-11 PROCEDURE — 80053 COMPREHEN METABOLIC PANEL: CPT | Performed by: STUDENT IN AN ORGANIZED HEALTH CARE EDUCATION/TRAINING PROGRAM

## 2024-05-11 PROCEDURE — 21400001 HC TELEMETRY ROOM

## 2024-05-11 RX ORDER — AMOXICILLIN AND CLAVULANATE POTASSIUM 875; 125 MG/1; MG/1
1 TABLET, FILM COATED ORAL EVERY 12 HOURS
Status: DISCONTINUED | OUTPATIENT
Start: 2024-05-11 | End: 2024-05-11

## 2024-05-11 RX ORDER — POTASSIUM CHLORIDE 20 MEQ/1
40 TABLET, EXTENDED RELEASE ORAL ONCE
Status: COMPLETED | OUTPATIENT
Start: 2024-05-11 | End: 2024-05-11

## 2024-05-11 RX ADMIN — CEFEPIME 1 G: 1 INJECTION, POWDER, FOR SOLUTION INTRAMUSCULAR; INTRAVENOUS at 04:05

## 2024-05-11 RX ADMIN — INSULIN ASPART 5 UNITS: 100 INJECTION, SOLUTION INTRAVENOUS; SUBCUTANEOUS at 05:05

## 2024-05-11 RX ADMIN — NIFEDIPINE 90 MG: 60 TABLET, FILM COATED, EXTENDED RELEASE ORAL at 08:05

## 2024-05-11 RX ADMIN — INSULIN DETEMIR 9 UNITS: 100 INJECTION, SOLUTION SUBCUTANEOUS at 08:05

## 2024-05-11 RX ADMIN — ISOSORBIDE DINITRATE 10 MG: 10 TABLET ORAL at 02:05

## 2024-05-11 RX ADMIN — MUPIROCIN: 20 OINTMENT TOPICAL at 10:05

## 2024-05-11 RX ADMIN — OXYCODONE HYDROCHLORIDE AND ACETAMINOPHEN 1 TABLET: 5; 325 TABLET ORAL at 08:05

## 2024-05-11 RX ADMIN — MUPIROCIN: 20 OINTMENT TOPICAL at 08:05

## 2024-05-11 RX ADMIN — CEFTRIAXONE SODIUM 1 G: 1 INJECTION, POWDER, FOR SOLUTION INTRAMUSCULAR; INTRAVENOUS at 08:05

## 2024-05-11 RX ADMIN — GABAPENTIN 300 MG: 300 CAPSULE ORAL at 02:05

## 2024-05-11 RX ADMIN — POTASSIUM CHLORIDE 40 MEQ: 1500 TABLET, EXTENDED RELEASE ORAL at 09:05

## 2024-05-11 RX ADMIN — TAMSULOSIN HYDROCHLORIDE 0.4 MG: 0.4 CAPSULE ORAL at 08:05

## 2024-05-11 RX ADMIN — APIXABAN 5 MG: 5 TABLET, FILM COATED ORAL at 10:05

## 2024-05-11 RX ADMIN — INSULIN ASPART 5 UNITS: 100 INJECTION, SOLUTION INTRAVENOUS; SUBCUTANEOUS at 08:05

## 2024-05-11 RX ADMIN — ISOSORBIDE DINITRATE 10 MG: 10 TABLET ORAL at 08:05

## 2024-05-11 RX ADMIN — INSULIN ASPART 5 UNITS: 100 INJECTION, SOLUTION INTRAVENOUS; SUBCUTANEOUS at 11:05

## 2024-05-11 RX ADMIN — ATORVASTATIN CALCIUM 80 MG: 40 TABLET, FILM COATED ORAL at 08:05

## 2024-05-11 RX ADMIN — PANTOPRAZOLE SODIUM 40 MG: 40 TABLET, DELAYED RELEASE ORAL at 08:05

## 2024-05-11 RX ADMIN — INSULIN DETEMIR 9 UNITS: 100 INJECTION, SOLUTION SUBCUTANEOUS at 10:05

## 2024-05-11 RX ADMIN — OXYCODONE HYDROCHLORIDE AND ACETAMINOPHEN 500 MG: 500 TABLET ORAL at 08:05

## 2024-05-11 RX ADMIN — OXYCODONE HYDROCHLORIDE AND ACETAMINOPHEN 1 TABLET: 5; 325 TABLET ORAL at 10:05

## 2024-05-11 RX ADMIN — APIXABAN 5 MG: 5 TABLET, FILM COATED ORAL at 08:05

## 2024-05-11 RX ADMIN — THERA TABS 1 TABLET: TAB at 08:05

## 2024-05-11 RX ADMIN — GABAPENTIN 300 MG: 300 CAPSULE ORAL at 10:05

## 2024-05-11 RX ADMIN — OXYCODONE HYDROCHLORIDE AND ACETAMINOPHEN 500 MG: 500 TABLET ORAL at 10:05

## 2024-05-11 RX ADMIN — ISOSORBIDE DINITRATE 10 MG: 10 TABLET ORAL at 10:05

## 2024-05-11 RX ADMIN — GABAPENTIN 300 MG: 300 CAPSULE ORAL at 08:05

## 2024-05-11 RX ADMIN — FUROSEMIDE 20 MG: 20 TABLET ORAL at 08:05

## 2024-05-11 NOTE — PLAN OF CARE
Aaox4. Tele monitored. Glucose maintained. Sams in place. Wound care performed. Medications administered per MAR. Fall risk monitor in place. Safety precautions maintained. Call bell within reach.

## 2024-05-11 NOTE — PROGRESS NOTES
[facility] ID note    Patient afebrile.  WBC 23.8.  Switch to ceftriaxone.    Urine culture with Providencia.  Blood cultures negative    Would change Sams  Wound care  Arterial workup  May need further imaging of right lower extremity  Continue ceftriaxone.  Could be changed to Bactrim at some point to complete course    Please call if any questions   Sameer Gayle  [facility] ID  [phone]

## 2024-05-11 NOTE — PROGRESS NOTES
"[facility] Medicine Progress Note    Primary Team: [facility] Hospitalist Team B  Attending Physician: Kumar Valdes MD  Resident: Melony  Intern: Iwona    Subjective:      Patient seen at bedside this morning. Explained to patient plan to exchange fung today and discussed with nursing staff. Complaining of foot would with chronic LE ulcers; will discuss with cardiology. Otherwise without any new complaints and doing well.      Objective:     Last 24 Hour Vital Signs:  BP  Min: 105/57  Max: 122/59  Temp  Av.4 °F (37.4 °C)  Min: 98.9 °F (37.2 °C)  Max: 100.1 °F (37.8 °C)  Pulse  Av.6  Min: 95  Max: 108  Resp  Av.2  Min: 18  Max: 22  SpO2  Av.7 %  Min: 96 %  Max: 100 %  Height  Av' 6" (167.6 cm)  Min: 5' 6" (167.6 cm)  Max: 5' 6" (167.6 cm)  Weight  Av.5 kg (208 lb 7.1 oz)  Min: 94.2 kg (207 lb 10.8 oz)  Max: 94.9 kg (209 lb 3.5 oz)  I/O last 3 completed shifts:  In: 1302.4 [P.O.:440; IV Piggyback:862.4]  Out: 1150 [Urine:1150]    Physical Examination:  Physical Exam  Constitutional:       General: He is not in acute distress.  HENT:      Head: Normocephalic and atraumatic.      Mouth/Throat:      Mouth: Mucous membranes are moist.      Pharynx: Oropharynx is clear.   Eyes:      Extraocular Movements: Extraocular movements intact.      Conjunctiva/sclera: Conjunctivae normal.   Cardiovascular:      Rate and Rhythm: Normal rate and regular rhythm.   Pulmonary:      Effort: No respiratory distress.      Breath sounds: Normal breath sounds.   Abdominal:      General: Abdomen is flat.      Palpations: Abdomen is soft.   Musculoskeletal:      Right lower leg: Edema present.      Comments: L AKA with healed site and no drainage or purulence   Skin:     General: Skin is warm.      Capillary Refill: Capillary refill takes less than 2 seconds.      Coloration: Skin is not jaundiced; Stage III ulcers on sacral region and buttocks, not worsened since admission.  Neurological:      Mental Status: " He is alert and oriented to person, place, and time.      Comments: 3/5 strength in RLE upon flexion and extension   Psychiatric:         Mood and Affect: Mood normal.         Behavior: Behavior normal.       Laboratory:  Laboratory Data Reviewed: yes  Pertinent Findings:  WBC 14.2-->15.8-->17.6  Hgb 7.4    Microbiology Data Reviewed: yes  Pertinent Findings:  Blood cx NGTD x 2 days  Urine Cx: prelim GNR    Radiology Data Reviewed: yes  Pertinent Findings:    X-Ray Tibia Fibula 2 View Right  Status: Final result     MyChart Results Release    [employer] Status: Pending  Results Release     PACS Images for [facility] [place] Viewer     Show images for X-Ray Tibia Fibula 2 View Right  X-Ray Tibia Fibula 2 View Right  Order: [id number]  Status: Final result       Visible to patient: No (inaccessible in Patient Portal)       Next appt: 05/21/2024 at 08:20 AM in Urology (Candy Goff NP)       Dx: Sepsis, due to unspecified organism, ...    0 Result Notes        Narrative & Impression  EXAMINATION:  XR TIBIA FIBULA 2 VIEW RIGHT     CLINICAL HISTORY:  Rule out infection;  Sepsis, unspecified organism     TECHNIQUE:  AP and lateral views of the right tibia and fibula were performed.     COMPARISON:  Prior dated 10/3/23     FINDINGS:  There is stable deformity in the mid fibular shaft consistent with remote fracture.  Fracture line remains visible but there is evidence of callus formation.  There is joint space narrowing involving the medial and lateral compartments of the knee and the tibiotalar joint consistent with osteoarthritis.  There is diffuse periosteal reaction at the distal tibia and fibula which could be due to venous stasis or hypertrophic osteoarthropathy.  This is unchanged.     There is soft tissue swelling.  Vascular calcifications are present.     Impression:     1. No significant change in the appearance of the tibia or fibula.  There is chronic stable periosteal reaction similar to prior exam and the  contralateral lower extremity prior exam.  This may be due to venous stasis or hypertrophic osteoarthropathy.  However due to the degree of periosteal reaction acute infectious process cannot be entirely excluded.  No erosions.  2. Stable remote fracture of the mid fibula.  3. Soft tissue swelling and vascular calcifications.       X-Ray Foot 2 View Right  Status: Final result     MyChart Results Release    MyChart Status: Pending  Results Release     PACS Images for [facility] [place] Viewer     Show images for X-Ray Foot 2 View Right  X-Ray Foot 2 View Right        0 Result Notes  Details      Narrative & Impression  EXAMINATION:  XR FOOT 2 VIEW RIGHT     CLINICAL HISTORY:  Evaluation for infection in patient with L AKA due to osteomyelitis;  Sepsis, unspecified organism     TECHNIQUE:  Three views of the right foot were obtained     COMPARISON:  Prior dated 10/03/2023     FINDINGS:  There is diffuse osteopenia, worse from prior study.  There are hammertoe deformities of the 2nd, 3rd, and 4th digits.  There is marked deformity of the 5th metatarsal which is unchanged and likely related to remote trauma.  No acute erosions are identified.  There is degenerative change of the midfoot.  There is calcaneal spurring.  There has been no significant change from prior exam.     Impression:     1. No convincing evidence of acute erosion, noting that sensitivity is somewhat limited by severe osteopenia which is worse when compared with the prior exam dated 10/03/2023  2. Stable hammertoe deformities and 5th metatarsal deformity likely related to remote trauma  3. Vascular calcifications  4. Soft tissue swelling              Current Medications:     Infusions:       Scheduled:   apixaban  5 mg Oral BID    ascorbic acid (vitamin C)  500 mg Oral BID    atorvastatin  80 mg Oral Daily    cefTRIAXone (Rocephin) IV (PEDS and ADULTS)  1 g Intravenous Q24H    furosemide  20 mg Oral Daily    gabapentin  300 mg Oral TID    insulin  aspart U-100  5 Units Subcutaneous TID WM    insulin detemir U-100  9 Units Subcutaneous BID    isosorbide dinitrate  10 mg Oral TID    multivitamin  1 tablet Oral Daily    mupirocin   Nasal BID    NIFEdipine  90 mg Oral Daily    pantoprazole  40 mg Oral Daily    tamsulosin  0.4 mg Oral Daily        PRN:    Current Facility-Administered Medications:     acetaminophen, 650 mg, Oral, Q6H PRN    dextrose 10%, 12.5 g, Intravenous, PRN    dextrose 10%, 12.5 g, Intravenous, PRN    dextrose 10%, 25 g, Intravenous, PRN    dextrose 10%, 25 g, Intravenous, PRN    glucagon (human recombinant), 1 mg, Intramuscular, PRN    glucagon (human recombinant), 1 mg, Intramuscular, PRN    glucose, 16 g, Oral, PRN    glucose, 16 g, Oral, PRN    glucose, 24 g, Oral, PRN    glucose, 24 g, Oral, PRN    insulin aspart U-100, 0-5 Units, Subcutaneous, QID (AC + HS) PRN    naloxone, 0.4 mg, Intravenous, PRN    oxyCODONE-acetaminophen, 1 tablet, Oral, Q8H PRN    sodium chloride 0.9%, 10 mL, Intravenous, Q12H PRN    Antibiotics and Day Number of Therapy:  Vanc, Cefepime day 2        Assessment:     Saji Castañeda is a 74 y.o. -American male who has a past medical history of DMII, HLD, HTN, Left AKA, PAD, Microcytic Anemia, Pacemaker for 3rd degree heart block. The patient presented to [facility] on 5/8/2024 with a primary complaint of elevated white blood cell count and cloudy output from indwelling Fung catheter site.     Plan:      Sepsis of unknown source  - evaluation of systems make it likely due to Urosepsis, particularly in setting of Fung catheter and cloudy, thick appearance of urine in catheter  - Met SIRS criteria with WBC of 14k with RR of 22  - afebrile on presentation  - UA remarkable for leukocytes and urobilinogen  - received Rocephin in ED  - Urine cx from May 2023 with Klebsiella near pan-susceptible  - consulted Urology and they are assessing potential follow up and whether to continue fung catheter  or alternative  - Abd/pelvic CT obtained to ensure no obstruction or concerning lesions: revealing of bladder wall thickening and bilateral inguinal lymphadenopathy  -  imaging of RLE to ensure no infectious process and no evidence to suggest infection  - ; blood cx NGTD x 2days; Urine cx growing providencia stuartii  Plan:  -transitioned abx to ceftriaxone per sensitivity results  -noted leukocytosis uptrending will closely monitor for fevers and broaden if appropriate    DMII  - A1c of >14 in 3/2024  - hospitalized for DKA around this time and evaluated by Endocrinology for improved glucose control  - regimen of Levemir 12u BID and Aspart 5u with meals  - Levemir not on formulary, so will provide Glargine instead along with Aspart  - Diabetic diet  - sugars appropriately controlled at this time     HLD   - on Rosuvastatin 40 at home: not on formulary, so will provide Atorvastatin     HTN  - continue home regimen of isosorbide dinitrate 10 TID, nifedipine 90  - additional benefit from home Lasix 20  - blood pressure well-controlled at this time     Left AKA from chronic osteomyelitis  - site stable  - imaging of RLE as described above     PAD  - lipid control as above     RUE DVT  - discovered roughly 2 months ago  - contiue home regimen of Eliquis 5 BID  -pt with non-gealing LE ulcers. MARIO's from end of 2023 inconclusive. Will consult cardiology and see if they would like to procee with CTA w/ runoff vs angiogram      Pacemaker for 3rd degree heart block  - pacer in place for pronounced bradycardia  - will monitor on telemetry, particularly in setting of likely concurrent atrial fibrillation        Code: Full per patient  DVT ppx: on Eliquis  Diet: Diabetic  Dispo: 2-3 days for full workup and infectious treatment     Vipin Clemons MD  [facility] -[facility]     [facility] Medicine Hospitalist Pager numbers:   [facility] Hospitalist Medicine Team A (Lorne/Riki):[phone]  [facility] Hospitalist Medicine Team B  (Arik/Lucio):        [phone]

## 2024-05-11 NOTE — CONSULTS
[place] [facility] Surg  Cardiology  Consult Note    Patient Name: Saji Castañeda  MRN: [id number]  Admission Date: 5/8/2024  Hospital Length of Stay: 2 days  Code Status: Full Code   Attending Provider: Kumar Valdes MD   Consulting Provider: Max Henry MD  Primary Care Physician: [facility] -  Principal Problem:Sepsis    Patient information was obtained from patient, past medical records, and ER records.     [facility]  Consult performed by: Max Henry MD  Consult ordered by: Vipin Clemons MD        Subjective:     Chief Complaint:      HPI:  Cardiology consulted for peripheral arterial disease    past medical history of DMII, HLD, HTN, Left AKA, PAD, Microcytic Anemia, Pacemaker for 3rd degree heart block, bedridden status and chronic Sams catheter who presented from assist facility.     Patient was significant bilateral lower extremity peripheral arterial disease.  Had left AKA done by vascular surgery.  He was evaluated by us last year with severe bilateral lower extremity peripheral arterial disease and wounds.  He was deemed not a candidate for revascularization due to significant debility      Past Medical History:   Diagnosis Date    Arthritis     legs    Bacteremia due to Gram-negative bacteria 3/23/2021    Diabetes mellitus     Diabetes mellitus, type 2     Hyperlipidemia     Hypertension     Osteomyelitis     Palliative care encounter 5/24/2023       Past Surgical History:   Procedure Laterality Date    ABOVE-KNEE AMPUTATION Left 3/27/2024    Procedure: AMPUTATION, ABOVE KNEE;  Surgeon: Adal Arellano MD;  Location: [facility] OR [address] [place] [address];  Service: Vascular;  Laterality: Left;    ANGIOGRAPHY OF LOWER EXTREMITY N/A 2/3/2021    Procedure: Angiogram Extremity Bilateral;  Surgeon: Ernst Chacko MD;  Location: [facility] OR [address] [place] [address];  Service: Peripheral Vascular;  Laterality: N/A;  7.4 mintues fluoroscopy time  816.15 mGy  170.17 Gycm2     AORTOGRAPHY WITH EXTREMITY RUNOFF Bilateral 2/3/2021    Procedure: AORTOGRAM, WITH EXTREMITY RUNOFF;  Surgeon: Ernst Chacko MD;  Location: [facility] OR 2ND FLR;  Service: Peripheral Vascular;  Laterality: Bilateral;    DEBRIDEMENT OF FOOT Left 2/23/2021    Procedure: DEBRIDEMENT, LEFT HEEL;  Surgeon: Mayra Schroeder DPM;  Location: [facility] OR 1ST FLR;  Service: Podiatry;  Laterality: Left;    FOOT AMPUTATION  October 2010    left high midfoot amputation    IMPLANTATION OF LEADLESS PACEMAKER N/A 10/12/2023    Procedure: [id number];  Surgeon: VANESSA Delatorre MD;  Location: [facility] EP LAB;  Service: Cardiology;  Laterality: N/A;  AVB, MICRA, EH, [place], [place] [id number]       Review of patient's allergies indicates:  No Known Allergies    No current facility-administered medications on file prior to encounter.     Current Outpatient Medications on File Prior to Encounter   Medication Sig    acetaminophen (TYLENOL) 325 MG tablet Take 650 mg by mouth every 6 (six) hours as needed for Pain.    acidophilus-pectin, citrus 100 million cell-10 mg Cap Take 1 capsule by mouth 2 (two) times a day.    apixaban (ELIQUIS) 5 mg Tab Take 1 tablet (5 mg total) by mouth 2 (two) times daily.    ascorbic acid, vitamin C, (VITAMIN C) 500 MG tablet Take 500 mg by mouth 2 (two) times daily.    B COMPLEX-VITAMIN B12 tablet Take 1 tablet by mouth once daily.    cholecalciferol, vitamin D3, 125 mcg (5,000 unit) Tab Take 5,000 Units by mouth once a week.    cloNIDine (CATAPRES) 0.1 MG tablet Take 0.1 mg by mouth 3 (three) times daily.    clopidogreL (PLAVIX) 75 mg tablet Take 1 tablet (75 mg total) by mouth once daily.    ferrous sulfate 325 (65 FE) MG EC tablet Take 325 mg by mouth 2 (two) times daily.    furosemide (LASIX) 20 MG tablet Take 1 tablet (20 mg total) by mouth once daily.    gabapentin (NEURONTIN) 300 MG capsule Take 300 mg by mouth 2 (two) times daily.    HYDROcodone-acetaminophen (NORCO) 5-325 mg per tablet Take  "1 tablet by mouth every morning. If needed for wound care (Patient taking differently: Take 1 tablet by mouth every 8 (eight) hours as needed for Pain. If needed for wound care)    insulin aspart U-100 (NOVOLOG) 100 unit/mL (3 mL) InPn pen Inject 5 Units into the skin 3 (three) times daily with meals.    insulin aspart U-100 (NOVOLOG) 100 unit/mL injection Inject 0-10 Units into the skin 3 (three) times daily before meals. 150-200: 2 units  201-250: 4 units  251-300: 6 units  301-350: 8 units  >350: 10 units    insulin detemir U-100, Levemir, (LEVEMIR FLEXPEN) 100 unit/mL (3 mL) InPn pen Inject 12 Units into the skin 2 (two) times daily.    multivitamin (THERAGRAN) per tablet Take 1 tablet by mouth once daily.    NIFEdipine (PROCARDIA-XL) 90 MG (OSM) 24 hr tablet Take 1 tablet (90 mg total) by mouth once daily.    pantoprazole (PROTONIX) 40 MG tablet Take 40 mg by mouth once daily. Before breakfast    rosuvastatin (CRESTOR) 40 MG Tab Take 40 mg by mouth once daily.    tamsulosin (FLOMAX) 0.4 mg Cap Take 0.4 mg by mouth once daily.    BD AUTOSHIELD DUO PEN NEEDLE 30 gauge x 3/16" Ndle     BD ULTRA-FINE ADITYA PEN NEEDLE 32 gauge x 5/32" Ndle USE FOUR TIMES DAILY WITH MEALS AND NIGHTLY    blood sugar diagnostic (CONTOUR TEST STRIPS) Strp 1 strip by Misc.(Non-Drug; Combo Route) route 4 (four) times daily. Use to check blood glucose 4x daily    lancets (MICROLET LANCET) Misc 1 each by Misc.(Non-Drug; Combo Route) route 4 (four) times daily. Use to check blood sugars 4x daily    pen needle, diabetic 32 gauge x 5/32" Ndle use as directed with insulin    [DISCONTINUED] hydroCHLOROthiazide (HYDRODIURIL) 25 MG tablet Take 0.5 tablets (12.5 mg total) by mouth every Mon, Wed, Fri. Beginning on 7/4/2022     Family History       Problem Relation (Age of Onset)    Cancer Brother    Diabetes Mother, Sister    Heart disease Mother    Stroke Sister          Tobacco Use    Smoking status: Never    Smokeless tobacco: Never   Substance " and Sexual Activity    Alcohol use: No     Comment: occassional    Drug use: No    Sexual activity: Not Currently     Review of Systems   Constitutional: Positive for malaise/fatigue.   Cardiovascular:  Negative for chest pain.   Respiratory:  Negative for shortness of breath.    Skin:  Positive for color change and poor wound healing.   Musculoskeletal:         Bed ridden     Objective:     Vital Signs (Most Recent):  Temp: 97 °F (36.1 °C) (05/11/24 1108)  Pulse: 78 (05/11/24 1210)  Resp: 18 (05/11/24 1108)  BP: (!) 108/55 (05/11/24 1108)  SpO2: 96 % (05/11/24 1108) Vital Signs (24h Range):  Temp:  [97 °F (36.1 °C)-100 °F (37.8 °C)] 97 °F (36.1 °C)  Pulse:  [] 78  Resp:  [18-22] 18  SpO2:  [96 %-100 %] 96 %  BP: (105-117)/(55-58) 108/55     Weight: 94.2 kg (207 lb 10.8 oz)  Body mass index is 33.52 kg/m².    SpO2: 96 %         Intake/Output Summary (Last 24 hours) at 5/11/2024 1524  Last data filed at 5/11/2024 1146  Gross per 24 hour   Intake 862.35 ml   Output 1175 ml   Net -312.65 ml       Lines/Drains/Airways       Drain  Duration                  Urethral Catheter 05/11/24 1049 16 Fr. <1 day              Peripheral Intravenous Line  Duration                  Peripheral IV - Single Lumen 05/10/24 0537 20 G Anterior;Left Forearm 1 day                    Physical Exam  Constitutional:       Appearance: He is ill-appearing.      Comments: Debilitated   Cardiovascular:      Rate and Rhythm: Normal rate.   Pulmonary:      Effort: No respiratory distress.   Abdominal:      General: Abdomen is flat.      Palpations: Abdomen is soft.   Musculoskeletal:      Comments: Left AKA   Skin:     Comments: Right heel ulcer   Neurological:      Mental Status: He is oriented to person, place, and time.   Psychiatric:         Behavior: Behavior normal.         Significant Labs: BMP:   Recent Labs   Lab 05/10/24  0515 05/11/24  0704   * 119*    136   K 4.3 3.5   CL 99 100   CO2 23 26   BUN 12 16   CREATININE 0.9  1.0   CALCIUM 8.6* 8.0*   , CMP   Recent Labs   Lab 05/10/24  0515 05/11/24  0704    136   K 4.3 3.5   CL 99 100   CO2 23 26   * 119*   BUN 12 16   CREATININE 0.9 1.0   CALCIUM 8.6* 8.0*   PROT 6.7 5.5*   ALBUMIN 1.7* 1.4*   BILITOT 0.3 0.3   ALKPHOS 95 87   AST 36 28   ALT 26 25   ANIONGAP 16 10   , and CBC   Recent Labs   Lab 05/10/24  0515 05/11/24  0704   WBC 17.61* 23.76*   HGB 7.4* 7.7*   HCT 25.4* 25.1*   * 580*       Significant Imaging: Echocardiogram: 2D echo with color flow doppler:   Results for orders placed or performed during the hospital encounter of 10/15/18   2D echo with color flow doppler   Result Value Ref Range    EF + QEF 60 55 - 65    Diastolic Dysfunction No     Aortic Valve Regurgitation TRIVIAL     Est. PA Systolic Pressure 26.04     Mitral Valve Mobility NORMAL     Tricuspid Valve Regurgitation TRIVIAL     Narrative    Date of Procedure: 10/16/2018        TEST DESCRIPTION   Technical Quality: This is a technically challenging study. There is poor endocardial definition.     Aorta: The aortic root is normal in size, measuring 3.1 cm at sinotubular junction and 3.5 cm at Sinuses of Valsalva. The proximal ascending aorta is normal in size, measuring 3.4 cm across.     Left Atrium: The left atrial volume index is mildly enlarged, measuring 38.46 cc/m2.     Left Ventricle: The left ventricle is normal in size, with an end-diastolic diameter of 4.1 cm, and an end-systolic diameter of 2.7 cm. LV wall thickness is normal, with the septum and the posterior wall each measuring 0.8 cm across. Relative wall   thickness was normal at 0.39, and the LV mass index was 46.5 g/m2 consistent with normal left ventricular mass. There are no regional wall motion abnormalities. Left ventricular systolic function appears normal. Visually estimated ejection fraction is   60-65%. The LV Doppler derived stroke volume equals 52.0 ccs.     Diastolic indices: E/e' ratio(avg) 4. Diastolic function  is normal.     Right Atrium: The right atrium is normal in size, measuring 4.3 cm in length and 3.6 cm in width in the apical view.     Right Ventricle: The right ventricle is normal in size measuring 3.4 cm at the base in the apical right ventricle-focused view. Global right ventricular systolic function appears normal. Tricuspid annular plane systolic excursion (TAPSE) is 1.9 cm.   Tissue Doppler-derived tricuspid annular peak systolic velocity (S prime) is 9.9 cm/s. The estimated PA systolic pressure is 26 mmHg.     Aortic Valve:  Aortic valve is normal in structure with normal leaflet mobility. The aortic valve is normal in structure with normal leaflet mobility. The aortic valve is tri-leaflet in structure. Additionally, there is trivial aortic regurgitation.     Mitral Valve:  Mitral valve is normal in structure with normal leaflet mobility. The mitral valve is normal in structure with normal leaflet mobility.     Tricuspid Valve:  Tricuspid valve is normal in structure with normal leaflet mobility. The tricuspid valve is normal in structure with normal leaflet mobility. There is trivial tricuspid regurgitation.     Pulmonary Valve:  Pulmonary valve is normal in structure with normal leaflet mobility. The pulmonic valve is normal in structure with normal leaflet mobility. There is trivial pulmonic regurgitation.     IVC: IVC is normal in size and collapses > 50% with a sniff, suggesting normal right atrial pressure of 3 mmHg.     Intracavitary: There is no evidence of pericardial effusion, intracavity mass, thrombi, or vegetation.         CONCLUSIONS     1 - Normal left ventricular systolic function (EF 60-65%).     2 - Normal left ventricular diastolic function.     3 - Normal right ventricular systolic function .     4 - Trivial aortic regurgitation.     5 - Trivial tricuspid regurgitation.     6 - The estimated PA systolic pressure is 26 mmHg.     7 - Mild left atrial enlargement.             This document  has been electronically    SIGNED BY: Mart Alcala MD On: 10/16/2018 11:00    and Transthoracic echo (TTE) complete (Cupid Only):   Results for orders placed or performed during the hospital encounter of 03/05/24   Echo   Result Value Ref Range    RA Width 4.87 cm    LA volume (mod) 113.86 cm3    Left Atrium Major Axis 7.02 cm    Left Atrium Minor Axis 6.95 cm    RA Major Axis 6.20 cm    LV Diastolic Volume 129.01 mL    LV Systolic Volume 49.46 mL    MV Peak A Dl 0.87 m/s    MV stenosis pressure 1/2 time 58.28 ms    TR Max Dl 3.19 m/s    MV Peak E Dl 1.40 m/s    Ao VTI 31.53 cm    Ao peak dl 1.49 m/s    LVOT peak VTI 20.20 cm    LVOT peak dl 0.93 m/s    LVOT diameter 2.18 cm    IVRT 62.80 msec    E wave deceleration time 200.97 msec    AV mean gradient 6 mmHg    TAPSE 2.04 cm    RVDD 3.57 cm    LA size 3.91 cm    Ascending aorta 3.14 cm    STJ 2.86 cm    Sinus 3.32 cm    LVIDs 3.46 2.1 - 4.0 cm    Posterior Wall 0.9 0.6 - 1.1 cm    IVS 1.2 (A) 0.6 - 1.1 cm    LVIDd 5.1 3.5 - 6.0 cm    TDI LATERAL 0.17 m/s    LA WIDTH 4.91 cm    TDI SEPTAL 0.10 m/s    LV LATERAL E/E' RATIO 8.24 m/s    LV SEPTAL E/E' RATIO 14.00 m/s    FS 32 28 - 44 %    LA volume 113.98 cm3    LV mass 200.78 g    ZLVIDD -5.96     ZLVIDS -3.78     Left Ventricle Relative Wall Thickness 0.35 cm    AV valve area 2.39 cm²    AV Velocity Ratio 0.62     AV index (prosthetic) 0.64     MV valve area p 1/2 method 3.77 cm2    E/A ratio 1.61     Mean e' 0.14 m/s    LVOT area 3.7 cm2    LVOT stroke volume 75.36 cm3    AV peak gradient 9 mmHg    E/E' ratio 10.37 m/s    LV Systolic Volume Index 21.3 mL/m2    LV Diastolic Volume Index 55.61 mL/m2    LA Volume Index 49.1 mL/m2    LV Mass Index 87 g/m2    Triscuspid Valve Regurgitation Peak Gradient 41 mmHg    LA Volume Index (Mod) 49.1 mL/m2    ARDEN by Velocity Ratio 2.33 cm²    BSA 2.41 m2    TV resting pulmonary artery pressure 44 mmHg    RV TB RVSP 6 mmHg    Est. RA pres 3 mmHg    Narrative      Left  Ventricle: The left ventricle is normal in size. Mild septal   thickening is present measuring 1.2 cm. There is normal systolic function   with a visually estimated ejection fraction of 60 - 65%. There is   indeterminate diastolic function.    Right Ventricle: Normal right ventricular cavity size. Wall thickness   is normal. Right ventricle wall motion  is normal. Systolic function is   normal.    Aortic Valve: The aortic valve is a trileaflet valve. There is mild   aortic valve sclerosis.    Tricuspid Valve: There is mild regurgitation.    Pulmonary Artery: There is mild pulmonary hypertension. The estimated   pulmonary artery systolic pressure is 44 mmHg.    IVC/SVC: Normal venous pressure at 3 mmHg.       Assessment and Plan:   74 years old male with    Peripheral arterial disease  Significant decubitus ulcers/including right heel pressure ulcer  Severe debility/bed ridden status  Anemia  Paroxysmal AFib on anticoagulation with Eliquis  Complete her block post permanent pacemaker  Left AKA  Type 2 diabetes mellitus/insulin independent    Plan    Have reviewed the prior ultrasounds.  He has significant peripheral arterial disease including below-the-knee vessels.  Unfortunately he has not moved for many years and currently bedridden/wheelchair-bound with significant debility and comorbidities.   Given the above he has not a candidate for revascularization.   We will just recommend medical therapy and wound care  Continue statin.  Continue Eliquis  Have discussed with him offloading.  The right heel ulcer is mainly a pressure ulcer that needs offloading    Please call us back with any questions, cardiology will sign off    Active Diagnoses:    Diagnosis Date Noted POA    PRINCIPAL PROBLEM:  Sepsis [A41.9] 10/02/2023 Yes    Wound of sacral region [S31.000A] 05/10/2024 Yes    Leukocytosis [D72.829] 05/10/2024 Unknown    Diabetes mellitus [E11.9] 05/10/2024 Unknown    Pacemaker [Z95.0] 04/02/2024 Yes     Chronic     Urinary retention [R33.9] 03/20/2024 Yes    History of amputation of left high mid foot  [Z89.432] 03/07/2024 Not Applicable    Paroxysmal atrial fibrillation [I48.0] 10/02/2023 Yes     Chronic    History of complete heart block [Z86.79] 10/02/2023 Not Applicable     Chronic    Pressure injury of left heel, stage 4 [L89.624] 02/18/2021 Yes    PVD (peripheral vascular disease) [I73.9] 02/03/2021 Yes    Peripheral arterial disease [I73.9] 12/10/2020 Yes    Anemia of chronic disease [D63.8] 10/15/2018 Yes    Insulin dependent type 2 diabetes mellitus [E11.9, Z79.4] 09/23/2014 Not Applicable     Chronic    Essential hypertension [I10] 08/05/2014 Yes     Chronic    Edema of leg [R60.0] 07/31/2012 Yes     Chronic      Problems Resolved During this Admission:       VTE Risk Mitigation (From admission, onward)           Ordered     apixaban tablet 5 mg  2 times daily         05/09/24 0248     IP VTE HIGH RISK PATIENT  Once         05/09/24 0219                    Thank you for your consult. I will sign off. Please contact us if you have any additional questions.    Max Henry MD  Cardiology   [facility] Surg

## 2024-05-11 NOTE — PLAN OF CARE
Patient is AAOx4. Tolerated diabetic diet. Turned q2 on bed . IV abx maintained. Heart and Glucose monitoring continued. PRN pain meds provided for complains of pain. Instructed to use call light for assistance. Dressing on sacrum and left hip changed as per order and  CDI. Betadine applied to rt foot wounds. Bed alarm set and maintained at lowest position. Call light within reach. Flaquita changed this AM and is CDI draining cloudy urine. CHG bath completed.

## 2024-05-12 LAB
ALBUMIN SERPL BCP-MCNC: 1.4 G/DL (ref 3.5–5.2)
ALP SERPL-CCNC: 82 U/L (ref 55–135)
ALT SERPL W/O P-5'-P-CCNC: 29 U/L (ref 10–44)
ANION GAP SERPL CALC-SCNC: 7 MMOL/L (ref 8–16)
AST SERPL-CCNC: 40 U/L (ref 10–40)
BASOPHILS # BLD AUTO: 0.02 K/UL (ref 0–0.2)
BASOPHILS NFR BLD: 0.1 % (ref 0–1.9)
BILIRUB SERPL-MCNC: 0.2 MG/DL (ref 0.1–1)
BLD PROD TYP BPU: NORMAL
BLOOD UNIT EXPIRATION DATE: NORMAL
BLOOD UNIT TYPE CODE: 6200
BLOOD UNIT TYPE: NORMAL
BUN SERPL-MCNC: 15 MG/DL (ref 8–23)
CALCIUM SERPL-MCNC: 8.2 MG/DL (ref 8.7–10.5)
CHLORIDE SERPL-SCNC: 102 MMOL/L (ref 95–110)
CO2 SERPL-SCNC: 28 MMOL/L (ref 23–29)
CODING SYSTEM: NORMAL
CREAT SERPL-MCNC: 1 MG/DL (ref 0.5–1.4)
CROSSMATCH INTERPRETATION: NORMAL
DIFFERENTIAL METHOD BLD: ABNORMAL
DISPENSE STATUS: NORMAL
EOSINOPHIL # BLD AUTO: 1.6 K/UL (ref 0–0.5)
EOSINOPHIL NFR BLD: 10.3 % (ref 0–8)
ERYTHROCYTE [DISTWIDTH] IN BLOOD BY AUTOMATED COUNT: 16.9 % (ref 11.5–14.5)
EST. GFR  (NO RACE VARIABLE): >60 ML/MIN/1.73 M^2
FERRITIN SERPL-MCNC: 499 NG/ML (ref 20–300)
GLUCOSE SERPL-MCNC: 141 MG/DL (ref 70–110)
HCT VFR BLD AUTO: 22 % (ref 40–54)
HGB BLD-MCNC: 6.7 G/DL (ref 14–18)
IMM GRANULOCYTES # BLD AUTO: 0.11 K/UL (ref 0–0.04)
IMM GRANULOCYTES NFR BLD AUTO: 0.7 % (ref 0–0.5)
IRON SERPL-MCNC: 30 UG/DL (ref 45–160)
LYMPHOCYTES # BLD AUTO: 1.4 K/UL (ref 1–4.8)
LYMPHOCYTES NFR BLD: 9 % (ref 18–48)
MCH RBC QN AUTO: 24.1 PG (ref 27–31)
MCHC RBC AUTO-ENTMCNC: 30.5 G/DL (ref 32–36)
MCV RBC AUTO: 79 FL (ref 82–98)
MONOCYTES # BLD AUTO: 0.6 K/UL (ref 0.3–1)
MONOCYTES NFR BLD: 4.2 % (ref 4–15)
NEUTROPHILS # BLD AUTO: 11.6 K/UL (ref 1.8–7.7)
NEUTROPHILS NFR BLD: 75.7 % (ref 38–73)
NRBC BLD-RTO: 0 /100 WBC
OHS QRS DURATION: 94 MS
OHS QTC CALCULATION: 511 MS
PLATELET # BLD AUTO: 526 K/UL (ref 150–450)
PMV BLD AUTO: 8.8 FL (ref 9.2–12.9)
POCT GLUCOSE: 157 MG/DL (ref 70–110)
POCT GLUCOSE: 157 MG/DL (ref 70–110)
POCT GLUCOSE: 169 MG/DL (ref 70–110)
POCT GLUCOSE: 189 MG/DL (ref 70–110)
POTASSIUM SERPL-SCNC: 3.6 MMOL/L (ref 3.5–5.1)
PROT SERPL-MCNC: 5.6 G/DL (ref 6–8.4)
RBC # BLD AUTO: 2.78 M/UL (ref 4.6–6.2)
SATURATED IRON: 27 % (ref 20–50)
SODIUM SERPL-SCNC: 137 MMOL/L (ref 136–145)
TOTAL IRON BINDING CAPACITY: 112 UG/DL (ref 250–450)
TRANS ERYTHROCYTES VOL PATIENT: NORMAL ML
TRANSFERRIN SERPL-MCNC: 76 MG/DL (ref 200–375)
WBC # BLD AUTO: 15.28 K/UL (ref 3.9–12.7)

## 2024-05-12 PROCEDURE — 86920 COMPATIBILITY TEST SPIN: CPT | Performed by: STUDENT IN AN ORGANIZED HEALTH CARE EDUCATION/TRAINING PROGRAM

## 2024-05-12 PROCEDURE — 30233N0 TRANSFUSION OF AUTOLOGOUS RED BLOOD CELLS INTO PERIPHERAL VEIN, PERCUTANEOUS APPROACH: ICD-10-PCS | Performed by: INTERNAL MEDICINE

## 2024-05-12 PROCEDURE — 80053 COMPREHEN METABOLIC PANEL: CPT | Performed by: STUDENT IN AN ORGANIZED HEALTH CARE EDUCATION/TRAINING PROGRAM

## 2024-05-12 PROCEDURE — 25000003 PHARM REV CODE 250

## 2024-05-12 PROCEDURE — 36415 COLL VENOUS BLD VENIPUNCTURE: CPT | Performed by: STUDENT IN AN ORGANIZED HEALTH CARE EDUCATION/TRAINING PROGRAM

## 2024-05-12 PROCEDURE — 63600175 PHARM REV CODE 636 W HCPCS

## 2024-05-12 PROCEDURE — P9021 RED BLOOD CELLS UNIT: HCPCS | Performed by: STUDENT IN AN ORGANIZED HEALTH CARE EDUCATION/TRAINING PROGRAM

## 2024-05-12 PROCEDURE — 25000003 PHARM REV CODE 250: Performed by: STUDENT IN AN ORGANIZED HEALTH CARE EDUCATION/TRAINING PROGRAM

## 2024-05-12 PROCEDURE — 85025 COMPLETE CBC W/AUTO DIFF WBC: CPT | Performed by: STUDENT IN AN ORGANIZED HEALTH CARE EDUCATION/TRAINING PROGRAM

## 2024-05-12 PROCEDURE — 82728 ASSAY OF FERRITIN: CPT | Performed by: STUDENT IN AN ORGANIZED HEALTH CARE EDUCATION/TRAINING PROGRAM

## 2024-05-12 PROCEDURE — 83540 ASSAY OF IRON: CPT | Performed by: STUDENT IN AN ORGANIZED HEALTH CARE EDUCATION/TRAINING PROGRAM

## 2024-05-12 PROCEDURE — 36430 TRANSFUSION BLD/BLD COMPNT: CPT

## 2024-05-12 PROCEDURE — 21400001 HC TELEMETRY ROOM

## 2024-05-12 RX ORDER — HYDROCODONE BITARTRATE AND ACETAMINOPHEN 500; 5 MG/1; MG/1
TABLET ORAL
Status: DISCONTINUED | OUTPATIENT
Start: 2024-05-12 | End: 2024-05-13 | Stop reason: HOSPADM

## 2024-05-12 RX ADMIN — FUROSEMIDE 20 MG: 20 TABLET ORAL at 09:05

## 2024-05-12 RX ADMIN — GABAPENTIN 300 MG: 300 CAPSULE ORAL at 10:05

## 2024-05-12 RX ADMIN — OXYCODONE HYDROCHLORIDE AND ACETAMINOPHEN 500 MG: 500 TABLET ORAL at 09:05

## 2024-05-12 RX ADMIN — INSULIN ASPART 5 UNITS: 100 INJECTION, SOLUTION INTRAVENOUS; SUBCUTANEOUS at 04:05

## 2024-05-12 RX ADMIN — GABAPENTIN 300 MG: 300 CAPSULE ORAL at 02:05

## 2024-05-12 RX ADMIN — MUPIROCIN: 20 OINTMENT TOPICAL at 09:05

## 2024-05-12 RX ADMIN — INSULIN ASPART 5 UNITS: 100 INJECTION, SOLUTION INTRAVENOUS; SUBCUTANEOUS at 09:05

## 2024-05-12 RX ADMIN — MUPIROCIN: 20 OINTMENT TOPICAL at 10:05

## 2024-05-12 RX ADMIN — INSULIN DETEMIR 9 UNITS: 100 INJECTION, SOLUTION SUBCUTANEOUS at 10:05

## 2024-05-12 RX ADMIN — OXYCODONE HYDROCHLORIDE AND ACETAMINOPHEN 500 MG: 500 TABLET ORAL at 10:05

## 2024-05-12 RX ADMIN — GABAPENTIN 300 MG: 300 CAPSULE ORAL at 09:05

## 2024-05-12 RX ADMIN — INSULIN ASPART 5 UNITS: 100 INJECTION, SOLUTION INTRAVENOUS; SUBCUTANEOUS at 12:05

## 2024-05-12 RX ADMIN — TAMSULOSIN HYDROCHLORIDE 0.4 MG: 0.4 CAPSULE ORAL at 09:05

## 2024-05-12 RX ADMIN — APIXABAN 5 MG: 5 TABLET, FILM COATED ORAL at 10:05

## 2024-05-12 RX ADMIN — ISOSORBIDE DINITRATE 10 MG: 10 TABLET ORAL at 09:05

## 2024-05-12 RX ADMIN — PANTOPRAZOLE SODIUM 40 MG: 40 TABLET, DELAYED RELEASE ORAL at 09:05

## 2024-05-12 RX ADMIN — NIFEDIPINE 90 MG: 60 TABLET, FILM COATED, EXTENDED RELEASE ORAL at 09:05

## 2024-05-12 RX ADMIN — ATORVASTATIN CALCIUM 80 MG: 40 TABLET, FILM COATED ORAL at 09:05

## 2024-05-12 RX ADMIN — INSULIN DETEMIR 9 UNITS: 100 INJECTION, SOLUTION SUBCUTANEOUS at 09:05

## 2024-05-12 RX ADMIN — CEFTRIAXONE SODIUM 1 G: 1 INJECTION, POWDER, FOR SOLUTION INTRAMUSCULAR; INTRAVENOUS at 09:05

## 2024-05-12 RX ADMIN — APIXABAN 5 MG: 5 TABLET, FILM COATED ORAL at 09:05

## 2024-05-12 RX ADMIN — THERA TABS 1 TABLET: TAB at 09:05

## 2024-05-12 NOTE — PLAN OF CARE
Problem: Adult Inpatient Plan of Care  Goal: Plan of Care Review  Outcome: Progressing  Flowsheets (Taken 5/12/2024 0529)  Plan of Care Reviewed With: patient     Problem: Diabetes Comorbidity  Goal: Blood Glucose Level Within Targeted Range  Outcome: Progressing  Intervention: Monitor and Manage Glycemia  Flowsheets (Taken 5/12/2024 0529)  Glycemic Management: blood glucose monitored     Problem: Wound  Goal: Optimal Coping  Outcome: Progressing  Goal: Absence of Infection Signs and Symptoms  Intervention: Prevent or Manage Infection  Flowsheets (Taken 5/12/2024 0529)  Infection Management: aseptic technique maintained     Vitals:    05/12/24 0409   BP: 127/61   Pulse: (!) 58   Resp: 16   Temp: 98.4 °F (36.9 °C)

## 2024-05-12 NOTE — PROGRESS NOTES
[facility] Medicine Progress Note    Primary Team: [facility] Hospitalist Team B  Attending Physician: Kumar Valdes MD  Resident: Melony  Intern: Iwona    Subjective:      Patient resting comfortably and eating breakfast.  Hemoglobin <7 without evidence of bleeds and stable vitals.  Patient typed and screened in past 48 hours and will be consented for blood transfusion with studies pending.     Objective:     Last 24 Hour Vital Signs:  BP  Min: 103/53  Max: 132/62  Temp  Av.9 °F (36.6 °C)  Min: 97 °F (36.1 °C)  Max: 98.4 °F (36.9 °C)  Pulse  Av.1  Min: 57  Max: 103  Resp  Av.5  Min: 16  Max: 18  SpO2  Av.8 %  Min: 96 %  Max: 99 %  Weight  Av.9 kg (220 lb 3.8 oz)  Min: 99.9 kg (220 lb 3.8 oz)  Max: 99.9 kg (220 lb 3.8 oz)  I/O last 3 completed shifts:  In: 517.1 [P.O.:220; IV Piggyback:297.1]  Out: 1975 [Urine:1975]    Physical Examination:  Physical Exam  Constitutional:       General: He is not in acute distress.  HENT:      Head: Normocephalic and atraumatic.      Mouth/Throat:      Mouth: Mucous membranes are moist.      Pharynx: Oropharynx is clear.   Eyes:      Extraocular Movements: Extraocular movements intact.      Conjunctiva/sclera: Conjunctivae normal.   Cardiovascular:      Rate and Rhythm: Normal rate and regular rhythm.   Pulmonary:      Effort: No respiratory distress.      Breath sounds: Normal breath sounds.   Abdominal:      General: Abdomen is flat.      Palpations: Abdomen is soft.   Musculoskeletal:      Right lower leg: Edema present.      Comments: L AKA with healed site and no drainage or purulence   Skin:     General: Skin is warm.      Capillary Refill: Capillary refill takes less than 2 seconds.      Coloration: Skin is not jaundiced; Stage III ulcers on sacral region and buttocks, not worsened since admission.  Neurological:      Mental Status: He is alert and oriented to person, place, and time.      Comments: 3/5 strength in RLE upon flexion and extension  but appears to be associated with effort  Psychiatric:         Mood and Affect: Mood normal.         Behavior: Behavior normal.          Laboratory:  Laboratory Data Reviewed: yes  Pertinent Findings:  Hgb 7.7-->6.7  WBC 15.2 (downtrending)    Microbiology Data Reviewed: yes  Pertinent Findings:  Urine cx = Providencia  Blood cx neg    Other Results:  Radiology Data Reviewed: yes  Pertinent Findings:  X-ray Right Tib/Fib 5/9/2024  Impression:     1. No significant change in the appearance of the tibia or fibula.  There is chronic stable periosteal reaction similar to prior exam and the contralateral lower extremity prior exam.  This may be due to venous stasis or hypertrophic osteoarthropathy.  However due to the degree of periosteal reaction acute infectious process cannot be entirely excluded.  No erosions.  2. Stable remote fracture of the mid fibula.  3. Soft tissue swelling and vascular calcifications.    Current Medications:     Infusions:       Scheduled:   apixaban  5 mg Oral BID    ascorbic acid (vitamin C)  500 mg Oral BID    atorvastatin  80 mg Oral Daily    cefTRIAXone (Rocephin) IV (PEDS and ADULTS)  1 g Intravenous Q24H    furosemide  20 mg Oral Daily    gabapentin  300 mg Oral TID    insulin aspart U-100  5 Units Subcutaneous TID WM    insulin detemir U-100  9 Units Subcutaneous BID    isosorbide dinitrate  10 mg Oral TID    multivitamin  1 tablet Oral Daily    mupirocin   Nasal BID    NIFEdipine  90 mg Oral Daily    pantoprazole  40 mg Oral Daily    tamsulosin  0.4 mg Oral Daily        PRN:    Current Facility-Administered Medications:     acetaminophen, 650 mg, Oral, Q6H PRN    dextrose 10%, 12.5 g, Intravenous, PRN    dextrose 10%, 12.5 g, Intravenous, PRN    dextrose 10%, 25 g, Intravenous, PRN    dextrose 10%, 25 g, Intravenous, PRN    glucagon (human recombinant), 1 mg, Intramuscular, PRN    glucagon (human recombinant), 1 mg, Intramuscular, PRN    glucose, 16 g, Oral, PRN    glucose, 16 g,  Oral, PRN    glucose, 24 g, Oral, PRN    glucose, 24 g, Oral, PRN    insulin aspart U-100, 0-5 Units, Subcutaneous, QID (AC + HS) PRN    naloxone, 0.4 mg, Intravenous, PRN    oxyCODONE-acetaminophen, 1 tablet, Oral, Q8H PRN    sodium chloride 0.9%, 10 mL, Intravenous, Q12H PRN    Antibiotics and Day Number of Therapy:  Rocephin 5/8; now resumed 5/11 and currently on  Vanc 5/9-5/10: discontinued  Cefepime: 5/9-5/11 now discontinued        Assessment:     Saji Castañeda is a 74 y.o. -American male who has a past medical history of DMII, HLD, HTN, Left AKA, PAD, Microcytic Anemia, Pacemaker for 3rd degree heart block. The patient presented to [facility] on 5/8/2024 with a primary complaint of elevated white blood cell count and cloudy output from indwelling Fung catheter site. Found to have Providencia UTI.     Plan:      Sepsis of unknown source  - evaluation of systems make it likely due to Urosepsis, particularly in setting of Fung catheter and cloudy, thick appearance of urine in catheter  - Met SIRS criteria with WBC of 14k with RR of 22; afebrile on presentation  - UA remarkable for leukocytes and urobilinogen  - received Rocephin in ED  - Urine cx from May 2023 with Klebsiella near pan-susceptible  - consulted Urology and they are assessing potential follow up and whether to continue fung catheter or alternative  - Abd/pelvic CT obtained to ensure no obstruction or concerning lesions: revealing of bladder wall thickening and bilateral inguinal lymphadenopathy  -  imaging of RLE to ensure no infectious process and no evidence to suggest infection  - Infectious Diseases consulted for history of ESBL  - blood cx NGTD x 4 days; Urine cx growing providencia stuartii and susceptible to Rocephin and Cefepime, either of which patient has been treated with for 4 days; transition to Bactrim to complete course in outpatient setting    Anemia  - history of anemia of chronic disease  - Hgb  downtrending without signs of bleeds; vitals stable; Typed and screened in anticipation of Hgb falling further; will obtain consent and transfuse patient and order studies       DMII  - A1c of >14 in 3/2024  - hospitalized for DKA around this time and evaluated by Endocrinology for improved glucose control  - regimen of Levemir 12u BID and Aspart 5u with meals  - Levemir not on formulary, so will provide Glargine instead along with Aspart  - Diabetic diet  - sugars appropriately controlled at this time     HLD   - on Rosuvastatin 40 at home: not on formulary, so will provide Atorvastatin     HTN  - continue home regimen of isosorbide dinitrate 10 TID, nifedipine 90  - additional benefit from home Lasix 20  - blood pressure well-controlled at this time     Left AKA from chronic osteomyelitis  - site stable  - imaging of RLE as described above     PAD  - Cardiology consulted for assessment as patient with history of amputations for limb osteomyelitis: patient deemed not candidate for revascularization given significant debility: continue medication management with statin and Eliquis     RUE DVT  - discovered roughly 2 months ago  - contiue home regimen of Eliquis 5 BID  -pt with non-gealing LE ulcers. MARIO's from end of 2023 inconclusive. consulted cardiology with plans as above     Pacemaker for 3rd degree heart block  - pacer in place for pronounced bradycardia  - will monitor on telemetry, particularly in setting of likely concurrent atrial fibrillation        Code: Full per patient  DVT ppx: on Eliquis  Diet: Diabetic  Dispo: likely discharge tomorrow on oral antibiotics    Sky Ty MD  [facility] Internal Medicine HO-III    [facility] Medicine Hospitalist Pager numbers:   [facility] Hospitalist Medicine Team A (Lorne/Riki): [phone]  [facility] Hospitalist Medicine Team B (Arik/Lucio):  [phone]

## 2024-05-12 NOTE — NURSING
Tried to talk patient into letting me flip his wedge to the opposite side of bed several times tonight because he has been in same position all night leaning to the right and he continues to decline and says that he is fine.

## 2024-05-12 NOTE — PLAN OF CARE
Pt is AAOx4 with no complaints of pain or n/v. Wound care performed to butt, hip, and RLE. Dr. Ty notified of patient running mary lou on tele and stated to hold 1500 dose of isosorbide dinitrate. Sams in place.

## 2024-05-12 NOTE — PROGRESS NOTES
[facility] ID note    Patient afebrile.  WBC 15.3.  On ceftriaxone.  Antibiotic day 5.  Patient was evaluated by Cardiology for revascularization and deemed to be poor candidate due to debility.    Hemoglobin 6.7   Urine culture with Providencia susceptible to ceftriaxone and Bactrim    Can continue antibiotics to complete 10 day course.  Could potentially be switched to oral Bactrim to complete course if able although anemia might be restricting    We will sign off at this time.    Please call if any questions   Sameer Gayle  [facility] ID  [phone]

## 2024-05-13 VITALS
HEART RATE: 80 BPM | DIASTOLIC BLOOD PRESSURE: 58 MMHG | SYSTOLIC BLOOD PRESSURE: 122 MMHG | TEMPERATURE: 98 F | BODY MASS INDEX: 35.4 KG/M2 | OXYGEN SATURATION: 98 % | WEIGHT: 220.25 LBS | RESPIRATION RATE: 16 BRPM | HEIGHT: 66 IN

## 2024-05-13 PROBLEM — L89.624 PRESSURE INJURY OF LEFT HEEL, STAGE 4: Status: RESOLVED | Noted: 2021-02-18 | Resolved: 2024-05-13

## 2024-05-13 PROBLEM — A41.9 SEPSIS: Status: RESOLVED | Noted: 2023-10-02 | Resolved: 2024-05-13

## 2024-05-13 LAB
ALBUMIN SERPL BCP-MCNC: 1.7 G/DL (ref 3.5–5.2)
ALP SERPL-CCNC: 86 U/L (ref 55–135)
ALT SERPL W/O P-5'-P-CCNC: 36 U/L (ref 10–44)
ANION GAP SERPL CALC-SCNC: 9 MMOL/L (ref 8–16)
AST SERPL-CCNC: 43 U/L (ref 10–40)
BASOPHILS # BLD AUTO: 0.03 K/UL (ref 0–0.2)
BASOPHILS NFR BLD: 0.2 % (ref 0–1.9)
BILIRUB SERPL-MCNC: 0.2 MG/DL (ref 0.1–1)
BUN SERPL-MCNC: 16 MG/DL (ref 8–23)
CALCIUM SERPL-MCNC: 8.4 MG/DL (ref 8.7–10.5)
CHLORIDE SERPL-SCNC: 101 MMOL/L (ref 95–110)
CO2 SERPL-SCNC: 28 MMOL/L (ref 23–29)
CREAT SERPL-MCNC: 0.9 MG/DL (ref 0.5–1.4)
DIFFERENTIAL METHOD BLD: ABNORMAL
EOSINOPHIL # BLD AUTO: 1.5 K/UL (ref 0–0.5)
EOSINOPHIL NFR BLD: 10 % (ref 0–8)
ERYTHROCYTE [DISTWIDTH] IN BLOOD BY AUTOMATED COUNT: 18.2 % (ref 11.5–14.5)
EST. GFR  (NO RACE VARIABLE): >60 ML/MIN/1.73 M^2
GLUCOSE SERPL-MCNC: 142 MG/DL (ref 70–110)
HCT VFR BLD AUTO: ABNORMAL % (ref 40–54)
HGB BLD-MCNC: ABNORMAL G/DL (ref 14–18)
IMM GRANULOCYTES # BLD AUTO: 0.19 K/UL (ref 0–0.04)
IMM GRANULOCYTES NFR BLD AUTO: 1.3 % (ref 0–0.5)
LYMPHOCYTES # BLD AUTO: 2 K/UL (ref 1–4.8)
LYMPHOCYTES NFR BLD: 13.2 % (ref 18–48)
MCH RBC QN AUTO: 25.6 PG (ref 27–31)
MCHC RBC AUTO-ENTMCNC: 31.4 G/DL (ref 32–36)
MCV RBC AUTO: 82 FL (ref 82–98)
MONOCYTES # BLD AUTO: 0.8 K/UL (ref 0.3–1)
MONOCYTES NFR BLD: 5.2 % (ref 4–15)
NEUTROPHILS # BLD AUTO: 10.4 K/UL (ref 1.8–7.7)
NEUTROPHILS NFR BLD: 70.1 % (ref 38–73)
NRBC BLD-RTO: 0 /100 WBC
PLATELET # BLD AUTO: 539 K/UL (ref 150–450)
PMV BLD AUTO: 8.9 FL (ref 9.2–12.9)
POCT GLUCOSE: 160 MG/DL (ref 70–110)
POCT GLUCOSE: 173 MG/DL (ref 70–110)
POCT GLUCOSE: 183 MG/DL (ref 70–110)
POTASSIUM SERPL-SCNC: 3.2 MMOL/L (ref 3.5–5.1)
PROT SERPL-MCNC: 6.3 G/DL (ref 6–8.4)
RBC # BLD AUTO: 3.32 M/UL (ref 4.6–6.2)
SODIUM SERPL-SCNC: 138 MMOL/L (ref 136–145)
WBC # BLD AUTO: 14.8 K/UL (ref 3.9–12.7)

## 2024-05-13 PROCEDURE — 80053 COMPREHEN METABOLIC PANEL: CPT | Performed by: STUDENT IN AN ORGANIZED HEALTH CARE EDUCATION/TRAINING PROGRAM

## 2024-05-13 PROCEDURE — 25000003 PHARM REV CODE 250: Performed by: STUDENT IN AN ORGANIZED HEALTH CARE EDUCATION/TRAINING PROGRAM

## 2024-05-13 PROCEDURE — 63600175 PHARM REV CODE 636 W HCPCS

## 2024-05-13 PROCEDURE — 85025 COMPLETE CBC W/AUTO DIFF WBC: CPT | Performed by: STUDENT IN AN ORGANIZED HEALTH CARE EDUCATION/TRAINING PROGRAM

## 2024-05-13 PROCEDURE — 25000003 PHARM REV CODE 250

## 2024-05-13 PROCEDURE — 36415 COLL VENOUS BLD VENIPUNCTURE: CPT | Performed by: STUDENT IN AN ORGANIZED HEALTH CARE EDUCATION/TRAINING PROGRAM

## 2024-05-13 RX ORDER — HYDROCODONE BITARTRATE AND ACETAMINOPHEN 5; 325 MG/1; MG/1
1 TABLET ORAL EVERY MORNING
Start: 2024-05-13 | End: 2024-05-13

## 2024-05-13 RX ORDER — SULFAMETHOXAZOLE AND TRIMETHOPRIM 800; 160 MG/1; MG/1
1 TABLET ORAL 2 TIMES DAILY
Qty: 10 TABLET | Refills: 0 | Status: SHIPPED | OUTPATIENT
Start: 2024-05-13 | End: 2024-05-23 | Stop reason: HOSPADM

## 2024-05-13 RX ORDER — ISOSORBIDE DINITRATE 10 MG/1
10 TABLET ORAL 3 TIMES DAILY
Start: 2024-05-13 | End: 2025-05-13

## 2024-05-13 RX ORDER — HYDROCODONE BITARTRATE AND ACETAMINOPHEN 5; 325 MG/1; MG/1
1 TABLET ORAL EVERY 8 HOURS PRN
Qty: 20 TABLET | Refills: 0 | Status: ON HOLD | OUTPATIENT
Start: 2024-05-13 | End: 2024-05-23 | Stop reason: HOSPADM

## 2024-05-13 RX ADMIN — INSULIN ASPART 0 UNITS: 100 INJECTION, SOLUTION INTRAVENOUS; SUBCUTANEOUS at 08:05

## 2024-05-13 RX ADMIN — ISOSORBIDE DINITRATE 10 MG: 10 TABLET ORAL at 03:05

## 2024-05-13 RX ADMIN — OXYCODONE HYDROCHLORIDE AND ACETAMINOPHEN 500 MG: 500 TABLET ORAL at 08:05

## 2024-05-13 RX ADMIN — GABAPENTIN 300 MG: 300 CAPSULE ORAL at 08:05

## 2024-05-13 RX ADMIN — FUROSEMIDE 20 MG: 20 TABLET ORAL at 08:05

## 2024-05-13 RX ADMIN — NIFEDIPINE 90 MG: 60 TABLET, FILM COATED, EXTENDED RELEASE ORAL at 08:05

## 2024-05-13 RX ADMIN — TAMSULOSIN HYDROCHLORIDE 0.4 MG: 0.4 CAPSULE ORAL at 08:05

## 2024-05-13 RX ADMIN — INSULIN ASPART 5 UNITS: 100 INJECTION, SOLUTION INTRAVENOUS; SUBCUTANEOUS at 08:05

## 2024-05-13 RX ADMIN — OXYCODONE HYDROCHLORIDE AND ACETAMINOPHEN 1 TABLET: 5; 325 TABLET ORAL at 11:05

## 2024-05-13 RX ADMIN — CEFTRIAXONE SODIUM 1 G: 1 INJECTION, POWDER, FOR SOLUTION INTRAMUSCULAR; INTRAVENOUS at 08:05

## 2024-05-13 RX ADMIN — THERA TABS 1 TABLET: TAB at 08:05

## 2024-05-13 RX ADMIN — ATORVASTATIN CALCIUM 80 MG: 40 TABLET, FILM COATED ORAL at 08:05

## 2024-05-13 RX ADMIN — ISOSORBIDE DINITRATE 10 MG: 10 TABLET ORAL at 08:05

## 2024-05-13 RX ADMIN — MUPIROCIN: 20 OINTMENT TOPICAL at 08:05

## 2024-05-13 RX ADMIN — GABAPENTIN 300 MG: 300 CAPSULE ORAL at 03:05

## 2024-05-13 RX ADMIN — INSULIN DETEMIR 9 UNITS: 100 INJECTION, SOLUTION SUBCUTANEOUS at 08:05

## 2024-05-13 RX ADMIN — INSULIN ASPART 5 UNITS: 100 INJECTION, SOLUTION INTRAVENOUS; SUBCUTANEOUS at 11:05

## 2024-05-13 RX ADMIN — PANTOPRAZOLE SODIUM 40 MG: 40 TABLET, DELAYED RELEASE ORAL at 08:05

## 2024-05-13 RX ADMIN — APIXABAN 5 MG: 5 TABLET, FILM COATED ORAL at 08:05

## 2024-05-13 NOTE — PLAN OF CARE
The sw faxed the pt's updated to his current [facility] via [facility] and notified them the pt will d/c today,the d/c orders will be sent once written.    11:27am Bryce in admissions is out today at [facility] so the sw left a message for someone to return the sw's call with the . She states she will give the message to Hina as soon as they get out of the morning meeting. The sw informed her the pt's ready for d/c today.     11:37am The sw spoke to [place] [phone] with [facility] who states Bryce is out but she is assisting Hina([facility] )with this pt's return to the [facility]. She confirmed she did see the Urology f/u for this pt. The sw faxed the d/c order to her via [facility]. She will review the orders and notify of a bed assignment and contact info for report if orders are correct.     12:37pm The sw refaxed the corrected d/c orders along with the Norco script to [place] at [phone].    1:03pm The sw spoke to [place] at the pt's clear to return. The sw spoke to the pt's nurse,gave her the contact info to call report along with the copied chart and requested a 2pm ambulance transport via [facility]. The sw spoke to the pt's sister January Nicholson [phone] via phone and informed her of the info mentioned above. She's in agreement with the d/c plan and will notify the rest of her family. She thanked the sw for contacting her.      05/13/24 5939   Post-Acute Status   Post-Acute Authorization Placement   Discharge Plan   Discharge Plan A Return to nursing home   Discharge Plan B Skilled Nursing Facility

## 2024-05-13 NOTE — PROGRESS NOTES
Patient refusing to be turned and having his sacrum and hip wound care completed. Will attempt again next round. Patient did allow nursing to apply betadine to toes.

## 2024-05-13 NOTE — PLAN OF CARE
[facility]    FACILITY TRANSFER ORDERS      Patient Name: Saji Castañeda  YOB: 1950    PCP: [facility] -   PCP Address: [address] / [place] [address]  PCP Phone Number: [phone]  PCP Fax: [phone]    Encounter Date: 05/13/2024    Admit to: [facility] [place]    Vital Signs:  Routine    Diagnoses:   Active Hospital Problems    Diagnosis  POA    Wound of sacral region [S31.000A]  Yes    Leukocytosis [D72.829]  Unknown    Diabetes mellitus [E11.9]  Unknown    Pacemaker [Z95.0]  Yes     Chronic    Urinary retention [R33.9]  Yes    History of amputation of left high mid foot  [Z89.432]  Not Applicable    Paroxysmal atrial fibrillation [I48.0]  Yes     Chronic    History of complete heart block [Z86.79]  Not Applicable     Chronic    PVD (peripheral vascular disease) [I73.9]  Yes    Peripheral arterial disease [I73.9]  Yes     - long standing wounds with skin graft to left TMA in [id number]; LE angio in 2014 with patent left CFA, SFA, PFA with 3V run off on LE angiogram by Vascular surgery  - CTA LE with run off 6/2022 with moderate to high grade disease of right popliteal with severely diseased AT, PT, peroneal and multiple occlusion of left popliteal with occluded PT and peroneal and short segment occlusion of AT- patient denies any previous intervention  - BLE arterial ultrasound 3/2023 with similar findings suggestive of bilateral popliteal and BTK disease; seen by Vascular surgery at [facility] with recommendation for amputation- patient declined  - transferred to [facility] 05/2023 for evaluation for revascularization; extensive disease and multiple co morbidities along with debility/bed bound status and concern for non compliance, deemed not a  Revascularization candidate and placed on statin and Plavix; no ASA given risk of bleeding with triple therapy        Anemia of chronic disease [D63.8]  Yes    Insulin dependent type 2 diabetes mellitus [E11.9, Z79.4]   Not Applicable     Chronic    Essential hypertension [I10]  Yes     Chronic      Resolved Hospital Problems    Diagnosis Date Resolved POA    *Sepsis [A41.9] 05/13/2024 Yes    Pressure injury of left heel, stage 4 [L89.624] 05/13/2024 Yes    Edema of leg [R60.0] 05/13/2024 Yes     Chronic       Allergies:Review of patient's allergies indicates:  No Known Allergies    Diet: diabetic diet: 2000 calorie    Activities: Activity as tolerated    Goals of Care Treatment Preferences:  Code Status: Full Code    Health care agent: January Nicholson [phone]  Health care agent number: [phone]          What is most important right now is to focus on avoiding the hospital, remaining as independent as possible.  Accordingly, we have decided that the best plan to meet the patient's goals includes continuing with treatment      Nursing: Standard, non-acute interventions     Labs: None    CONSULTS:    N/A: Urology follow up established    MISCELLANEOUS CARE:  Routine Skin for Bedridden Patients: Apply moisture barrier cream to all skin folds and wet areas in perineal area daily and after baths and all bowel movements.    WOUND CARE ORDERS  Yes: Pressure Ulcer(s) Stage II :   Location: Gluteal/sacral               Medications: Review discharge medications with patient and family and provide education.      Current Discharge Medication List        START taking these medications    Details   sulfamethoxazole-trimethoprim 800-160mg (BACTRIM DS) 800-160 mg Tab Take 1 tablet by mouth 2 (two) times daily. for 5 days  Qty: 10 tablet, Refills: 0      Hydrocodone-acetaminophen (NORCO) 5-325 mg  Take 1 tablet by mouth every 8 hours as needed for pain for 7 days for wound care  +  Dispense: 20 tablets; 0 refills          CONTINUE these medications which have CHANGED    Details   isosorbide dinitrate (ISORDIL) 10 MG tablet Take 1 tablet (10 mg total) by mouth 3 (three) times daily. Hold until follow up with a provider           CONTINUE  these medications which have NOT CHANGED    Details   acetaminophen (TYLENOL) 325 MG tablet Take 650 mg by mouth every 6 (six) hours as needed for Pain.      acidophilus-pectin, citrus 100 million cell-10 mg Cap Take 1 capsule by mouth 2 (two) times a day.      apixaban (ELIQUIS) 5 mg Tab Take 1 tablet (5 mg total) by mouth 2 (two) times daily.    Associated Diagnoses: Sepsis      ascorbic acid, vitamin C, (VITAMIN C) 500 MG tablet Take 500 mg by mouth 2 (two) times daily.      B COMPLEX-VITAMIN B12 tablet Take 1 tablet by mouth once daily.      cholecalciferol, vitamin D3, 125 mcg (5,000 unit) Tab Take 5,000 Units by mouth once a week.      ferrous sulfate 325 (65 FE) MG EC tablet Take 325 mg by mouth 2 (two) times daily.      furosemide (LASIX) 20 MG tablet Take 1 tablet (20 mg total) by mouth once daily.  Qty: 30 tablet, Refills: 11      gabapentin (NEURONTIN) 300 MG capsule Take 300 mg by mouth 2 (two) times daily.      HYDROcodone-acetaminophen (NORCO) 5-325 mg per tablet Take 1 tablet by mouth every morning. If needed for wound care  Qty: 20 tablet, Refills: 0    Comments: Quantity prescribed more than 7 day supply? Yes, quantity medically necessary      insulin aspart U-100 (NOVOLOG) 100 unit/mL (3 mL) InPn pen Inject 5 Units into the skin 3 (three) times daily with meals.  Qty: 3 each, Refills: 11      insulin aspart U-100 (NOVOLOG) 100 unit/mL injection Inject 0-10 Units into the skin 3 (three) times daily before meals. 150-200: 2 units  201-250: 4 units  251-300: 6 units  301-350: 8 units  >350: 10 units      insulin detemir U-100, Levemir, (LEVEMIR FLEXPEN) 100 unit/mL (3 mL) InPn pen Inject 12 Units into the skin 2 (two) times daily.  Qty: 21.6 mL, Refills: 3      multivitamin (THERAGRAN) per tablet Take 1 tablet by mouth once daily.      NIFEdipine (PROCARDIA-XL) 90 MG (OSM) 24 hr tablet Take 1 tablet (90 mg total) by mouth once daily.  Qty: 30 tablet, Refills: 11    Comments: .  Associated Diagnoses:  "Sepsis      pantoprazole (PROTONIX) 40 MG tablet Take 40 mg by mouth once daily. Before breakfast      rosuvastatin (CRESTOR) 40 MG Tab Take 40 mg by mouth once daily.      tamsulosin (FLOMAX) 0.4 mg Cap Take 0.4 mg by mouth once daily.      BD AUTOSHIELD DUO PEN NEEDLE 30 gauge x 3/16" Ndle       BD ULTRA-FINE ADITYA PEN NEEDLE 32 gauge x 5/32" Ndle USE FOUR TIMES DAILY WITH MEALS AND NIGHTLY  Qty: 100 each, Refills: 0    Associated Diagnoses: Type II diabetes mellitus, uncontrolled      blood sugar diagnostic (CONTOUR TEST STRIPS) Strp 1 strip by Misc.(Non-Drug; Combo Route) route 4 (four) times daily. Use to check blood glucose 4x daily  Qty: 100 strip, Refills: 6    Comments: To use with Contour Ascencia meter  Associated Diagnoses: Type 2 diabetes mellitus with hyperglycemia, with long-term current use of insulin      lancets (MICROLET LANCET) Misc 1 each by Misc.(Non-Drug; Combo Route) route 4 (four) times daily. Use to check blood sugars 4x daily  Refills: 0    Associated Diagnoses: DKA (diabetic ketoacidosis)           STOP taking these medications       cloNIDine (CATAPRES) 0.1 MG tablet Comments:   Reason for Stopping:         clopidogreL (PLAVIX) 75 mg tablet Comments:   Reason for Stopping:                  Immunizations Administered as of 5/13/2024       No immunizations on file.                 Some patients may experience side effects after vaccination.  These may include fever, headache, muscle or joint aches.  Most symptoms resolve with 24-48 hours and do not require urgent medical evaluation unless they persist for more than 72 hours or symptoms are concerning for an unrelated medical condition.          _________________________________  Sky Ty MD  05/13/2024         "

## 2024-05-13 NOTE — PLAN OF CARE
Patient is going to a facility. Nurse to call report to nurse at receiving facility. Copy of AVS will be sent with patient

## 2024-05-13 NOTE — PLAN OF CARE
Problem: Adult Inpatient Plan of Care  Goal: Plan of Care Review  Outcome: Progressing  Goal: Patient-Specific Goal (Individualized)  Outcome: Progressing  Goal: Absence of Hospital-Acquired Illness or Injury  Outcome: Progressing  Goal: Optimal Comfort and Wellbeing  Outcome: Progressing  Goal: Readiness for Transition of Care  Outcome: Progressing     Problem: Diabetes Comorbidity  Goal: Blood Glucose Level Within Targeted Range  Outcome: Progressing     Problem: Sepsis/Septic Shock  Goal: Optimal Coping  Outcome: Progressing  Goal: Absence of Bleeding  Outcome: Progressing  Goal: Blood Glucose Level Within Targeted Range  Outcome: Progressing  Goal: Absence of Infection Signs and Symptoms  Outcome: Progressing  Goal: Optimal Nutrition Intake  Outcome: Progressing     Problem: Acute Kidney Injury/Impairment  Goal: Fluid and Electrolyte Balance  Outcome: Progressing  Goal: Improved Oral Intake  Outcome: Progressing  Goal: Effective Renal Function  Outcome: Progressing     Problem: Wound  Goal: Optimal Coping  Outcome: Progressing  Goal: Optimal Functional Ability  Outcome: Progressing  Goal: Absence of Infection Signs and Symptoms  Outcome: Progressing  Goal: Improved Oral Intake  Outcome: Progressing  Goal: Optimal Pain Control and Function  Outcome: Progressing  Goal: Skin Health and Integrity  Outcome: Progressing  Goal: Optimal Wound Healing  Outcome: Progressing     Problem: Infection  Goal: Absence of Infection Signs and Symptoms  Outcome: Progressing     Problem: Fall Injury Risk  Goal: Absence of Fall and Fall-Related Injury  Outcome: Progressing     Problem: Skin Injury Risk Increased  Goal: Skin Health and Integrity  Outcome: Progressing     Problem: Constipation  Goal: Effective Bowel Elimination  Outcome: Progressing     Problem: UTI (Urinary Tract Infection)  Goal: Improved Infection Symptoms  Outcome: Progressing     Problem: Urinary Retention  Goal: Effective Urinary Elimination  Outcome:  Progressing     Problem: Anemia  Goal: Anemia Symptom Improvement  Outcome: Progressing

## 2024-05-13 NOTE — MEDICAL/APP STUDENT
[facility] Medicine Discharge Summary    Primary Team: [facility] Hospitalist Team B  Attending Physician: Kumar Valdes MD  Resident: Rhina Ty MD  Intern: Belkys Newman MD    Date of Admit: 5/8/2024  Date of Discharge: 5/13/2024    Discharge to: skilled nursing nursing  Condition: stable    Discharge Diagnoses     Sepsis of unknown source  Anemia    Consultants and Procedures     Consultants:  Consults (From admission, onward)          Status Ordering Provider     Inpatient consult to Cardiology-[facility]  Once        Provider:  Max Henry MD    Completed HORACIO MARCUM     Inpatient consult to Infectious Diseases  Once        Provider:  (Not yet assigned)    Completed RHINA TY     Inpatient consult to Registered Dietitian/Nutritionist  Once        Provider:  (Not yet assigned)    Completed GENEVIEVE BAILEY     Inpatient consult to Urology  Once        Provider:  (Not yet assigned)    Completed BELKYS NEWMAN            Procedures:     Imaging:  Imaging Results              CT Abdomen Pelvis With IV Contrast NO Oral Contrast (Final result)  Result time 05/09/24 02:12:24      Final result by Melvi Adkins MD (05/09/24 02:12:24)                   Impression:      Distal colon mild stool burden constipation.  No evidence of bowel obstruction or inflammatory changes..    Cholelithiasis without convincing cholecystitis by CT.  No biliary dilatation.    Sams catheter balloon is noted within the prostate and the Sams catheter tip is noted at the base of a nondistended bladder.  Note is made however of relative bladder wall thickening.  Correlate for trabeculation related to prostatomegaly or cystitis.    Bilateral inguinal adenopathy.    Mild anasarca.    There are multiple additional nonacute findings in the abdomen or pelvis as described above.      Electronically signed by: Melvi Adkins  Date:    05/09/2024  Time:    02:12               Narrative:    EXAMINATION:  CT ABDOMEN PELVIS WITH IV  CONTRAST    CLINICAL HISTORY:  Sepsis;    TECHNIQUE:  Low dose axial images, sagittal and coronal reformations were obtained from the lung bases to the pubic symphysis following the IV administration of 100 mL of Omnipaque 350 .  Oral contrast was not administered.    COMPARISON:  CT abdomen pelvis 02/03/2012, CTA abdomen pelvis with runoff 06/16/2022    FINDINGS:  Abdomen:    - Lower thorax:Visualized heart is not significantly enlarged.  There is no pericardial effusion.    - Lung bases: No infiltrates and no nodules.    - Liver: Liver appears homogeneous and not significantly enlarged.    - Gallbladder: Gallbladder contains innumerable gallstones.  There is no CT evidence of cholecystitis or gallbladder distension.    - Bile Ducts: No evidence of intra or extra hepatic biliary ductal dilation.    - Spleen: Negative.    - Kidneys: Left renal simple cyst noted in the upper pole.  Too small to characterize low densities possibly cysts also noted in the interpolar region.  Punctate nonobstructing calcification noted in the right kidney lower pole.    - Adrenals: Unremarkable.    - Pancreas: No mass or peripancreatic fat stranding.    - Retroperitoneum:  No significant adenopathy.    - Vascular: No abdominal aortic aneurysm.  There is mild scattered atherosclerosis in the aorta and iliac arteries.  IVC filter is present.    - Abdominal wall:  Mild stranding in the subcutaneous fat suggests anasarca..    Pelvis:    Sams catheter tip is noted within a collapsed bladder with air-fluid level noted.  Sams catheter balloon is noted in the prostate.  There is diffuse bladder wall thickening which may be related to nondistended state however the degree of thickening raises question of cystitis versus trabeculation related to a bladder prominent prostate.  There is no pelvic mass, adenopathy, or free fluid.  There is a right hydrocele again noted.  Prostate is prominent.    There is inguinal adenopathy bilaterally.  No  pelvic adenopathy.    Bowel/Mesentery:    There is mild stool burden constipation in the distal colon and rectum.  No evidence of stercoral colitis no evidence of bowel obstruction or inflammation.  The appendix is normal.    Bones:  No acute osseous abnormality and no suspicious lytic or blastic lesion.  There is spondylosis of the visualized spine.  No acute compression fractures are seen.  Degenerative changes of the hips bilaterally as well as SI joints.                                       X-Ray Chest 1 View (Final result)  Result time 05/08/24 22:02:26      Final result by Melvi Akdins MD (05/08/24 22:02:26)                   Impression:      No acute interval detrimental changes involving the lungs compared to 04/01/2024.    Small right pleural effusion is noted as well as linear basilar opacities suggesting atelectasis.  Residual pneumonitis in the right lung base not reliably excluded.    Interval resolution of the mid lung opacities.      Electronically signed by: Melvi Adkins  Date:    05/08/2024  Time:    22:02               Narrative:    EXAMINATION:  XR CHEST 1 VIEW    CLINICAL HISTORY:  Sepsis;    TECHNIQUE:  Single frontal view of the chest was performed.    COMPARISON:  04/01/2024, 03/29/2024 chest x-rays    FINDINGS:  The cardiomediastinal silhouette is stable and not significantly enlarged.  There is some persistent right costophrenic angle blunting compatible with small right pleural effusion.  Mild linear lung base opacities on the right noted.  There is interval resolution of the upper to mid lung opacities on the right.  Left lung centrally appears clear.  There is no evidence of pneumothorax.  Degenerative changes of the spine and shoulders noted.                                      Brief History of Present Illness      Saji Castañeda is a 74 y.o. -American male who has a past medical history of DMII, HLD, HTN, Left AKA, PAD, Microcytic Anemia, Pacemaker for 3rd degree  heart block. The patient presented to [facility] on 5/8/2024 with a primary complaint of elevated white blood cell count and cloudy output from indwelling Fung catheter site. Found to have Providencia UTI.     For the full HPI please refer to the History & Physical from this admission.    Hospital Course By Problem with Pertinent Findings     Sepsis of unknown source  - evaluation of systems make it likely due to Urosepsis, particularly in setting of Fung catheter and cloudy, thick appearance of urine in catheter  - Met SIRS criteria with WBC of 14k with RR of 22; afebrile on presentation  - UA remarkable for leukocytes and urobilinogen  - received Rocephin in ED  - Urine cx from May 2023 with Klebsiella near pan-susceptible  - consulted Urology and they are assessing potential follow up and whether to continue fung catheter or alternative  - Abd/pelvic CT obtained to ensure no obstruction or concerning lesions: revealing of bladder wall thickening and bilateral inguinal lymphadenopathy  -  imaging of RLE to ensure no infectious process and no evidence to suggest infection  - Infectious Diseases consulted for history of ESBL  - blood cx NGTD x 5 days; Urine cx growing providencia stuartii and susceptible to Rocephin and Cefepime, either of which patient has been treated with for 4 days; transition to Bactrim to complete course in outpatient setting for overall total treatment of 10 days     Anemia  - history of anemia of chronic disease  - Hgb downtrending without signs of bleeds; vitals stable   underwent transfusion and tolerated well with Hgb of 8.5        DMII  - A1c of >14 in 3/2024  - hospitalized for DKA around this time and evaluated by Endocrinology for improved glucose control  - regimen of Levemir 12u BID and Aspart 5u with meals  - Levemir not on formulary, so provided Glargine instead along with Aspart  - Diabetic diet  - sugars appropriately controlled at this time     HLD   - on  "Rosuvastatin 40 at home: not on formulary, so will provide Atorvastatin     HTN  - continue home regimen of isosorbide dinitrate 10 TID, nifedipine 90  - additional benefit from home Lasix 20  - blood pressure well-controlled at this time     Left AKA from chronic osteomyelitis  - site stable  - imaging of RLE as described above     PAD  - Cardiology consulted for assessment as patient with history of amputations for limb osteomyelitis: patient deemed not candidate for revascularization given significant debility: continued medication management with statin and Eliquis     RUE DVT  - discovered roughly 2 months ago  - contiue home regimen of Eliquis 5 BID  - pt with non-healing LE ulcers. MARIO's from end of 2023 inconclusive. consulted cardiology with plans as above     Pacemaker for 3rd degree heart block  - pacer in place for pronounced bradycardia  - Monitored on telemetry, particularly in setting of likely concurrent atrial fibrillation    Discharge Medications        Medication List        START taking these medications      sulfamethoxazole-trimethoprim 800-160mg 800-160 mg Tab  Commonly known as: BACTRIM DS  Take 1 tablet by mouth 2 (two) times daily. for 5 days            CHANGE how you take these medications      BD ULTRA-FINE ADITYA PEN NEEDLE 32 gauge x 5/32" Ndle  Generic drug: pen needle, diabetic  USE FOUR TIMES DAILY WITH MEALS AND NIGHTLY  What changed: Another medication with the same name was removed. Continue taking this medication, and follow the directions you see here.     HYDROcodone-acetaminophen 5-325 mg per tablet  Commonly known as: NORCO  Take 1 tablet by mouth every morning. If needed for wound care  What changed:   when to take this  reasons to take this            CONTINUE taking these medications      acetaminophen 325 MG tablet  Commonly known as: TYLENOL     acidophilus-pectin, citrus 100 million cell-10 mg Cap     apixaban 5 mg Tab  Commonly known as: ELIQUIS  Take 1 tablet (5 mg " "total) by mouth 2 (two) times daily.     ascorbic acid (vitamin C) 500 MG tablet  Commonly known as: VITAMIN C     B COMPLEX-VITAMIN B12 tablet  Generic drug: b complex vitamins     BD AUTOSHIELD DUO PEN NEEDLE 30 gauge x 3/16" Ndle  Generic drug: pen needle,diabetic dual safty     blood sugar diagnostic Strp  Commonly known as: CONTOUR TEST STRIPS  1 strip by Misc.(Non-Drug; Combo Route) route 4 (four) times daily. Use to check blood glucose 4x daily     cholecalciferol (vitamin D3) 125 mcg (5,000 unit) Tab     ferrous sulfate 325 (65 FE) MG EC tablet     FLOMAX 0.4 mg Cap  Generic drug: tamsulosin     furosemide 20 MG tablet  Commonly known as: LASIX  Take 1 tablet (20 mg total) by mouth once daily.     gabapentin 300 MG capsule  Commonly known as: NEURONTIN     * insulin aspart U-100 100 unit/mL injection  Commonly known as: NovoLOG     * insulin aspart U-100 100 unit/mL (3 mL) Inpn pen  Commonly known as: NovoLOG  Inject 5 Units into the skin 3 (three) times daily with meals.     isosorbide dinitrate 10 MG tablet  Commonly known as: ISORDIL  Take 1 tablet (10 mg total) by mouth 3 (three) times daily. Hold until follow up with a provider     lancets Misc  Commonly known as: MICROLET LANCET  1 each by Misc.(Non-Drug; Combo Route) route 4 (four) times daily. Use to check blood sugars 4x daily     LEVEMIR FLEXPEN 100 unit/mL (3 mL) Inpn pen  Generic drug: insulin detemir U-100 (Levemir)  Inject 12 Units into the skin 2 (two) times daily.     multivitamin per tablet  Commonly known as: THERAGRAN     NIFEdipine 90 MG (OSM) 24 hr tablet  Commonly known as: PROCARDIA-XL  Take 1 tablet (90 mg total) by mouth once daily.     pantoprazole 40 MG tablet  Commonly known as: PROTONIX     rosuvastatin 40 MG Tab  Commonly known as: CRESTOR           * This list has 2 medication(s) that are the same as other medications prescribed for you. Read the directions carefully, and ask your doctor or other care provider to review them " with you.                STOP taking these medications      cloNIDine 0.1 MG tablet  Commonly known as: CATAPRES     clopidogreL 75 mg tablet  Commonly known as: PLAVIX               Where to Get Your Medications        These medications were sent to [id number] - [place] - [address] AT [facility] [address]  [address] [address]      Phone: [phone]   sulfamethoxazole-trimethoprim 800-160mg 800-160 mg Tab       Information about where to get these medications is not yet available    Ask your nurse or doctor about these medications  isosorbide dinitrate 10 MG tablet         Discharge Information:   Diet:  Diabetic diet    Physical Activity:  As tolerated             Instructions:  1. Take all medications as prescribed  2. Keep all follow-up appointments  3. Return to the hospital or call your primary care physicians if any worsening symptoms such as fever, chest pain, shortness of breath, return of symptoms, or any other concerns.    Follow-Up Appointments:  Urology     Miriam Diaz MS3  [facility]

## 2024-05-13 NOTE — NURSING
Assumed care of patient from BEREKET Pendleton, patient is AAOX4, bed bound, room air, vitals WNL, no c/o pain or discomfort, left AKA, left hip and buttocks stage 3 with wound care orders, patient continues to decline care and states that he is fine and comfortable for now, right arm weakness, swallows pills whole, waffle mattress and wedge and also refuses to be Q2 turned, stating that he is comfortable and that he is fine right now, risk sitter video camera in room, fung in place, but refused fung care, all safety precautions are in place, call bell and tray table are within reach of the patient and no additional questions or concerns from the patient at this time.

## 2024-05-13 NOTE — NURSING
New Results - Micro  Sorted by update time  Updated   Order  05/13/24 0612  Blood culture x two cultures. Draw prior to antibiotics  Collected: 05/08/24 2110  Preliminary result  Specimen: Blood from Peripheral, Antecubital, Right     Blood Culture, Routine No Growth to date P Blood Culture, Routine No Growth to date P  Blood Culture, Routine No Growth to date P Blood Culture, Routine No Growth to date P  Blood Culture, Routine No Growth to date P         05/13/24 0612  Blood culture x two cultures. Draw prior to antibiotics  Collected: 05/08/24 2115  Preliminary result  Specimen: Blood from Peripheral, Hand, Right       Blood Culture, Routine No Growth to date P Blood Culture, Routine No Growth to date P  Blood Culture, Routine No Growth to date P Blood Culture, Routine No Growth to date P  Blood Culture, Routine No Growth to date P

## 2024-05-13 NOTE — PROGRESS NOTES
The sw requested a 2pm ambulance transport via [facility] for the pt and the family has been notified of the transport time. The pt's nurse has the contact info to call report along with the copied chart. The pt has no further Case Management needs and is clear to d/c. [place] acknowledged receipt of the pt's Urology f/u via [facility]. The pt has no further Case Management needs and is clear to d/c.     Future Appointments   Date Time Provider Department Center   5/21/2024  8:20 AM Candy Goff NP [facility] UROLOGY [facility] - Med Surg  Discharge Final Note    Primary Care Provider: [facility] -    Expected Discharge Date: 5/13/2024    Final Discharge Note (most recent)       Final Note - 05/13/24 0934          Post-Acute Status    Post-Acute Authorization Placement                     Important Message from Medicare             Contact Info       Candy Goff NP   Specialty: Urology    [address] [facility] [address]   Phone: [phone]       Next Steps: Follow up on 5/21/2024    Instructions: 8:20am [facility]-longterm    [address] [facility] [address]       Next Steps: Follow up    Instructions: The pt will return to his nursing home nursing home listed above at the time of d/c.

## 2024-05-13 NOTE — PROGRESS NOTES
[facility] Medicine Progress Note    Primary Team: [facility] Hospitalist Team B  Attending Physician: Kumar Valdes MD  Resident: Melony  Intern: Iwona    Subjective:      Patient resting comfortably and eating breakfast.  Tolerated blood transfusion well. Denies any signs or symptoms of bleeds.  Has no complaints at this time and endorses desire to return to his residential facility.       Objective:     Last 24 Hour Vital Signs:  BP  Min: 109/55  Max: 127/60  Temp  Av.2 °F (36.8 °C)  Min: 97 °F (36.1 °C)  Max: 99.1 °F (37.3 °C)  Pulse  Av.6  Min: 53  Max: 83  Resp  Av  Min: 16  Max: 20  SpO2  Av.6 %  Min: 96 %  Max: 100 %  I/O last 3 completed shifts:  In: 316 [P.O.:220; IV Piggyback:96]  Out: 1550 [Urine:1550]    Physical Examination:  Physical Exam  Constitutional:       General: He is not in acute distress.  HENT:      Head: Normocephalic and atraumatic.      Mouth/Throat:      Mouth: Mucous membranes are moist.      Pharynx: Oropharynx is clear.   Eyes:      Extraocular Movements: Extraocular movements intact.      Conjunctiva/sclera: Conjunctivae normal.   Cardiovascular:      Rate and Rhythm: Normal rate and regular rhythm.   Pulmonary:      Effort: No respiratory distress.      Breath sounds: Normal breath sounds.   Abdominal:      General: Abdomen is flat.      Palpations: Abdomen is soft.   Musculoskeletal:      Right lower leg: Edema present.      Comments: L AKA with healed site and no drainage or purulence   Skin:     General: Skin is warm.      Capillary Refill: Capillary refill takes less than 2 seconds.      Coloration: Skin is not jaundiced; Stage III ulcers on sacral region and buttocks, not worsened since admission.  Neurological:      Mental Status: He is alert and oriented to person, place, and time.      Comments: 3/5 strength in RLE upon flexion and extension but appears to be associated with effort  Psychiatric:         Mood and Affect: Mood normal.         Behavior:  Behavior normal.          Laboratory:  Laboratory Data Reviewed: yes  Pertinent Findings:  Hgb 7.7-->6.7-->8.5  WBC 14.8 (downtrending)    Microbiology Data Reviewed: yes  Pertinent Findings:  Urine cx = Providencia  Blood cx neg    Other Results:  Radiology Data Reviewed: yes  Pertinent Findings:  X-ray Right Tib/Fib 5/9/2024  Impression:     1. No significant change in the appearance of the tibia or fibula.  There is chronic stable periosteal reaction similar to prior exam and the contralateral lower extremity prior exam.  This may be due to venous stasis or hypertrophic osteoarthropathy.  However due to the degree of periosteal reaction acute infectious process cannot be entirely excluded.  No erosions.  2. Stable remote fracture of the mid fibula.  3. Soft tissue swelling and vascular calcifications.    Current Medications:     Infusions:       Scheduled:   apixaban  5 mg Oral BID    ascorbic acid (vitamin C)  500 mg Oral BID    atorvastatin  80 mg Oral Daily    cefTRIAXone (Rocephin) IV (PEDS and ADULTS)  1 g Intravenous Q24H    furosemide  20 mg Oral Daily    gabapentin  300 mg Oral TID    insulin aspart U-100  5 Units Subcutaneous TID WM    insulin detemir U-100  9 Units Subcutaneous BID    isosorbide dinitrate  10 mg Oral TID    multivitamin  1 tablet Oral Daily    mupirocin   Nasal BID    NIFEdipine  90 mg Oral Daily    pantoprazole  40 mg Oral Daily    tamsulosin  0.4 mg Oral Daily        PRN:    Current Facility-Administered Medications:     0.9%  NaCl infusion (for blood administration), , Intravenous, Q24H PRN    acetaminophen, 650 mg, Oral, Q6H PRN    dextrose 10%, 12.5 g, Intravenous, PRN    dextrose 10%, 12.5 g, Intravenous, PRN    dextrose 10%, 25 g, Intravenous, PRN    dextrose 10%, 25 g, Intravenous, PRN    glucagon (human recombinant), 1 mg, Intramuscular, PRN    glucagon (human recombinant), 1 mg, Intramuscular, PRN    glucose, 16 g, Oral, PRN    glucose, 16 g, Oral, PRN    glucose, 24 g, Oral,  PRN    glucose, 24 g, Oral, PRN    insulin aspart U-100, 0-5 Units, Subcutaneous, QID (AC + HS) PRN    naloxone, 0.4 mg, Intravenous, PRN    oxyCODONE-acetaminophen, 1 tablet, Oral, Q8H PRN    sodium chloride 0.9%, 10 mL, Intravenous, Q12H PRN    Antibiotics and Day Number of Therapy:  Rocephin 5/8; now resumed 5/11 and currently on  Vanc 5/9-5/10: discontinued  Cefepime: 5/9-5/11 now discontinued        Assessment:     Saji Castañeda is a 74 y.o. -American male who has a past medical history of DMII, HLD, HTN, Left AKA, PAD, Microcytic Anemia, Pacemaker for 3rd degree heart block. The patient presented to [facility] on 5/8/2024 with a primary complaint of elevated white blood cell count and cloudy output from indwelling Fung catheter site. Found to have Providencia UTI.     Plan:      Sepsis of unknown source  - evaluation of systems make it likely due to Urosepsis, particularly in setting of Fung catheter and cloudy, thick appearance of urine in catheter  - Met SIRS criteria with WBC of 14k with RR of 22; afebrile on presentation  - UA remarkable for leukocytes and urobilinogen  - received Rocephin in ED  - Urine cx from May 2023 with Klebsiella near pan-susceptible  - consulted Urology and they are assessing potential follow up and whether to continue fung catheter or alternative  - Abd/pelvic CT obtained to ensure no obstruction or concerning lesions: revealing of bladder wall thickening and bilateral inguinal lymphadenopathy  -  imaging of RLE to ensure no infectious process and no evidence to suggest infection  - Infectious Diseases consulted for history of ESBL  - blood cx NGTD x 5 days; Urine cx growing providencia stuartii and susceptible to Rocephin and Cefepime, either of which patient has been treated with for 4 days; transition to Bactrim to complete course in outpatient setting for overall total treatment of 10 days    Anemia  - history of anemia of chronic disease  - Hgb  downtrending without signs of bleeds; vitals stable   underwent transfusion and tolerated well with Hgb of 8.5       DMII  - A1c of >14 in 3/2024  - hospitalized for DKA around this time and evaluated by Endocrinology for improved glucose control  - regimen of Levemir 12u BID and Aspart 5u with meals  - Levemir not on formulary, so will provide Glargine instead along with Aspart  - Diabetic diet  - sugars appropriately controlled at this time     HLD   - on Rosuvastatin 40 at home: not on formulary, so will provide Atorvastatin     HTN  - continue home regimen of isosorbide dinitrate 10 TID, nifedipine 90  - additional benefit from home Lasix 20  - blood pressure well-controlled at this time     Left AKA from chronic osteomyelitis  - site stable  - imaging of RLE as described above     PAD  - Cardiology consulted for assessment as patient with history of amputations for limb osteomyelitis: patient deemed not candidate for revascularization given significant debility: continue medication management with statin and Eliquis     RUE DVT  - discovered roughly 2 months ago  - contiue home regimen of Eliquis 5 BID  -pt with non-gealing LE ulcers. MARIO's from end of 2023 inconclusive. consulted cardiology with plans as above     Pacemaker for 3rd degree heart block  - pacer in place for pronounced bradycardia  - will monitor on telemetry, particularly in setting of likely concurrent atrial fibrillation        Code: Full per patient  DVT ppx: on Eliquis  Diet: Diabetic  Dispo: likely discharge today on oral antibiotics    Sky Ty MD  [facility] Internal Medicine HO-III    [facility] Medicine Hospitalist Pager numbers:   [facility] Hospitalist Medicine Team A (Lorne/Riki): [phone]  [facility] Hospitalist Medicine Team B (Arik/Lucio):  [phone]

## 2024-05-13 NOTE — DISCHARGE SUMMARY
[facility] Hospital Medicine Discharge Summary    Primary Team: [facility] Hospitalist Team B  Attending Physician: Kumar Valdes MD  Resident: Dr. Ty  Intern: Dr. Bustillo    Date of Admit: 5/8/2024  Date of Discharge: 5/13/2024    Discharge to: NH  Condition: stable    Discharge Diagnoses     Sepsis 2/2 UTI (providencia stuartii)  Anemia requiring transfusion      Consultants and Procedures     Consultants:  ID  Urology  Cardiology    Procedures:   Transfusion of 1 u pRBC    Imaging:  CXR  Xray R Foot  Xray R Tib/Fib    Brief History of Present Illness      Saji Castañeda is a 74 y.o. -American male who has a past medical history of DMII, HLD, HTN, Left AKA, PAD, Microcytic Anemia, Pacemaker for 3rd degree heart block. The patient presented to [facility] on 5/8/2024 with a primary complaint of elevated white blood cell count and cloudy output from indwelling Fung catheter site at nursing home. He was treated with 4 days of CTX and urine cx's grew providencia stuartii. ID was consulted for hx of ESBL but UTI ss to rocephin. Urology consulted and will leave fung in place with close follow up in clinic for voiding trial and further discussion of urinary retention. Cardiology consulted for PAD and recommend continued medical therapy. Patient discharged back to NH in stable condition with oral Bactrim for remainder of abx course. He will need close follow up with Urology.       For the full HPI please refer to the History & Physical from this admission.    Hospital Course By Problem with Pertinent Findings     Sepsis secondary to UTI  - evaluation of systems make it likely due to Urosepsis, particularly in setting of Fung catheter and cloudy, thick appearance of urine in catheter  - Met SIRS criteria with WBC of 14k with RR of 22; afebrile on presentation  - UA remarkable for leukocytes and urobilinogen  - consulted Urology and they have scheduled follow up for whether to continue fung catheter  or alternative outpatient  - Abd/pelvic CT obtained to ensure no obstruction or concerning lesions: revealing of bladder wall thickening and bilateral inguinal lymphadenopathy  - Infectious Diseases consulted for history of ESBL; (Ucx from 5/23 with Klebsiella near pan-ss)  - blood cx NGTD x 5 days; Urine cx growing providencia stuartii and susceptible to Rocephin and Cefepime, either of which patient has been treated with for 4 days  - Discharge with Bactrim to complete course in outpatient setting for overall total treatment of 10 days     Anemia  - history of anemia of chronic disease  - Hgb downtrending without signs of bleeds; vitals stable   underwent transfusion of 1 u pRBC and tolerated well with Hgb of 8.5        DMII  - A1c of >14 in 3/2024  - hospitalized for DKA around this time and evaluated by Endocrinology for improved glucose control  - regimen of Levemir 12u BID and Aspart 5u with meals  - Levemir not on formulary, so provided Glargine instead along with Aspart  - Diabetic diet     HLD   - Continue home Rosuvastatin 40     HTN  - continue home regimen of isosorbide dinitrate 10 TID, nifedipine 90, and Lasix 20     Left AKA from chronic osteomyelitis  - site stable  - imaging of RLE without concerning signs of infection     PAD  - Cardiology consulted for assessment as patient with history of amputations for limb osteomyelitis: patient deemed not candidate for revascularization given significant debility: continued medication management with statin and Eliquis     RUE DVT  - discovered roughly 2 months ago  - contiue home regimen of Eliquis 5 BID  - pt with non-healing LE ulcers. MARIO's from end of 2023 inconclusive. consulted cardiology with plans as above     Pacemaker for 3rd degree heart block  - pacer in place for pronounced bradycardia  - Monitored on telemetry, particularly in setting of likely concurrent atrial fibrillation       Discharge Medications        Medication List        START taking  "these medications      sulfamethoxazole-trimethoprim 800-160mg 800-160 mg Tab  Commonly known as: BACTRIM DS  Take 1 tablet by mouth 2 (two) times daily. for 5 days            CHANGE how you take these medications      BD ULTRA-FINE ADITYA PEN NEEDLE 32 gauge x 5/32" Ndle  Generic drug: pen needle, diabetic  USE FOUR TIMES DAILY WITH MEALS AND NIGHTLY  What changed: Another medication with the same name was removed. Continue taking this medication, and follow the directions you see here.            CONTINUE taking these medications      acetaminophen 325 MG tablet  Commonly known as: TYLENOL     acidophilus-pectin, citrus 100 million cell-10 mg Cap     apixaban 5 mg Tab  Commonly known as: ELIQUIS  Take 1 tablet (5 mg total) by mouth 2 (two) times daily.     ascorbic acid (vitamin C) 500 MG tablet  Commonly known as: VITAMIN C     B COMPLEX-VITAMIN B12 tablet  Generic drug: b complex vitamins     BD AUTOSHIELD DUO PEN NEEDLE 30 gauge x 3/16" Ndle  Generic drug: pen needle,diabetic dual safty     blood sugar diagnostic Strp  Commonly known as: CONTOUR TEST STRIPS  1 strip by Misc.(Non-Drug; Combo Route) route 4 (four) times daily. Use to check blood glucose 4x daily     cholecalciferol (vitamin D3) 125 mcg (5,000 unit) Tab     ferrous sulfate 325 (65 FE) MG EC tablet     FLOMAX 0.4 mg Cap  Generic drug: tamsulosin     furosemide 20 MG tablet  Commonly known as: LASIX  Take 1 tablet (20 mg total) by mouth once daily.     gabapentin 300 MG capsule  Commonly known as: NEURONTIN     HYDROcodone-acetaminophen 5-325 mg per tablet  Commonly known as: NORCO  Take 1 tablet by mouth every 8 (eight) hours as needed for Pain. If needed for wound care     * insulin aspart U-100 100 unit/mL injection  Commonly known as: NovoLOG     * insulin aspart U-100 100 unit/mL (3 mL) Inpn pen  Commonly known as: NovoLOG  Inject 5 Units into the skin 3 (three) times daily with meals.     isosorbide dinitrate 10 MG tablet  Commonly known as: " ISORDIL  Take 1 tablet (10 mg total) by mouth 3 (three) times daily. Hold until follow up with a provider     lancets Misc  Commonly known as: MICROLET LANCET  1 each by Misc.(Non-Drug; Combo Route) route 4 (four) times daily. Use to check blood sugars 4x daily     LEVEMIR FLEXPEN 100 unit/mL (3 mL) Inpn pen  Generic drug: insulin detemir U-100 (Levemir)  Inject 12 Units into the skin 2 (two) times daily.     multivitamin per tablet  Commonly known as: THERAGRAN     NIFEdipine 90 MG (OSM) 24 hr tablet  Commonly known as: PROCARDIA-XL  Take 1 tablet (90 mg total) by mouth once daily.     pantoprazole 40 MG tablet  Commonly known as: PROTONIX     rosuvastatin 40 MG Tab  Commonly known as: CRESTOR           * This list has 2 medication(s) that are the same as other medications prescribed for you. Read the directions carefully, and ask your doctor or other care provider to review them with you.                STOP taking these medications      cloNIDine 0.1 MG tablet  Commonly known as: CATAPRES     clopidogreL 75 mg tablet  Commonly known as: PLAVIX               Where to Get Your Medications        These medications were sent to [id number] - [place] - [address] AT [facility] [address]  [facility] [place] [address]      Phone: [phone]   sulfamethoxazole-trimethoprim 800-160mg 800-160 mg Tab       You can get these medications from any pharmacy    Bring a paper prescription for each of these medications  HYDROcodone-acetaminophen 5-325 mg per tablet       Information about where to get these medications is not yet available    Ask your nurse or doctor about these medications  isosorbide dinitrate 10 MG tablet         Discharge Information:   Diet:  Diabetic    Physical Activity:  As tolerated             Instructions:  1. Take all medications as prescribed  2. Keep all follow-up appointments  3. Return to the hospital or call your primary care physicians  if any worsening symptoms such as fever, chest pain, shortness of breath, return of symptoms, or any other concerns.    Follow-Up Appointments:      Belkys Bustillo MD  [facility] Internal Medicine, -1

## 2024-05-14 LAB
BACTERIA BLD CULT: NORMAL
BACTERIA BLD CULT: NORMAL

## 2024-05-16 ENCOUNTER — PATIENT OUTREACH (OUTPATIENT)
Dept: ADMINISTRATIVE | Facility: CLINIC | Age: 74
End: 2024-05-16
Payer: MEDICARE

## 2024-05-19 ENCOUNTER — HOSPITAL ENCOUNTER (INPATIENT)
Facility: HOSPITAL | Age: 74
LOS: 4 days | Discharge: HOME | DRG: 871 | End: 2024-05-23
Attending: EMERGENCY MEDICINE | Admitting: INTERNAL MEDICINE
Payer: MEDICARE

## 2024-05-19 DIAGNOSIS — N39.0 URINARY TRACT INFECTION ASSOCIATED WITH INDWELLING URETHRAL CATHETER, INITIAL ENCOUNTER: ICD-10-CM

## 2024-05-19 DIAGNOSIS — N39.0 SEPSIS SECONDARY TO UTI: ICD-10-CM

## 2024-05-19 DIAGNOSIS — A41.9 SEPSIS: ICD-10-CM

## 2024-05-19 DIAGNOSIS — L89.45: ICD-10-CM

## 2024-05-19 DIAGNOSIS — M86.179 OTHER ACUTE OSTEOMYELITIS, UNSPECIFIED ANKLE AND FOOT: ICD-10-CM

## 2024-05-19 DIAGNOSIS — A41.9 SEPSIS SECONDARY TO UTI: ICD-10-CM

## 2024-05-19 DIAGNOSIS — T83.511A URINARY TRACT INFECTION ASSOCIATED WITH INDWELLING URETHRAL CATHETER, INITIAL ENCOUNTER: ICD-10-CM

## 2024-05-19 DIAGNOSIS — N40.0 BENIGN PROSTATIC HYPERPLASIA WITHOUT LOWER URINARY TRACT SYMPTOMS: ICD-10-CM

## 2024-05-19 DIAGNOSIS — A41.9 SEPSIS, DUE TO UNSPECIFIED ORGANISM, UNSPECIFIED WHETHER ACUTE ORGAN DYSFUNCTION PRESENT: ICD-10-CM

## 2024-05-19 DIAGNOSIS — R41.82 ALTERED MENTAL STATUS, UNSPECIFIED ALTERED MENTAL STATUS TYPE: Primary | ICD-10-CM

## 2024-05-19 PROBLEM — I44.1 AV BLOCK, 2ND DEGREE: Status: RESOLVED | Noted: 2023-05-24 | Resolved: 2024-05-19

## 2024-05-19 PROBLEM — M86.272 SUBACUTE OSTEOMYELITIS OF LEFT FOOT: Status: RESOLVED | Noted: 2023-03-09 | Resolved: 2024-05-19

## 2024-05-19 PROBLEM — E11.9 DIABETES MELLITUS: Status: RESOLVED | Noted: 2024-05-10 | Resolved: 2024-05-19

## 2024-05-19 PROBLEM — S31.000A WOUND OF SACRAL REGION: Status: RESOLVED | Noted: 2024-05-10 | Resolved: 2024-05-19

## 2024-05-19 PROBLEM — D50.9 IRON DEFICIENCY ANEMIA: Status: RESOLVED | Noted: 2021-02-21 | Resolved: 2024-05-19

## 2024-05-19 PROBLEM — L03.116 CELLULITIS OF LEFT LOWER LEG: Status: RESOLVED | Noted: 2021-02-18 | Resolved: 2024-05-19

## 2024-05-19 PROBLEM — E11.65 HYPERGLYCEMIA DUE TO DIABETES MELLITUS: Status: RESOLVED | Noted: 2023-05-15 | Resolved: 2024-05-19

## 2024-05-19 PROBLEM — R33.9 URINARY RETENTION: Status: RESOLVED | Noted: 2024-03-20 | Resolved: 2024-05-19

## 2024-05-19 PROBLEM — R22.31 LOCALIZED SWELLING OF RIGHT UPPER EXTREMITY: Status: RESOLVED | Noted: 2024-03-14 | Resolved: 2024-05-19

## 2024-05-19 PROBLEM — A49.8 PROTEUS INFECTION: Status: RESOLVED | Noted: 2024-03-09 | Resolved: 2024-05-19

## 2024-05-19 PROBLEM — R65.20 SEVERE SEPSIS: Status: RESOLVED | Noted: 2024-03-05 | Resolved: 2024-05-19

## 2024-05-19 PROBLEM — I44.30 AVB (ATRIOVENTRICULAR BLOCK): Status: RESOLVED | Noted: 2023-10-11 | Resolved: 2024-05-19

## 2024-05-19 PROBLEM — D72.829 LEUKOCYTOSIS: Status: RESOLVED | Noted: 2024-05-10 | Resolved: 2024-05-19

## 2024-05-19 PROBLEM — M86.60 CHRONIC OSTEOMYELITIS: Status: RESOLVED | Noted: 2023-05-15 | Resolved: 2024-05-19

## 2024-05-19 PROBLEM — E87.70 EDEMA DUE TO HYPERVOLEMIA: Status: RESOLVED | Noted: 2024-03-31 | Resolved: 2024-05-19

## 2024-05-19 PROBLEM — M86.9 OSTEOMYELITIS OF LEFT LOWER EXTREMITY: Status: RESOLVED | Noted: 2021-02-18 | Resolved: 2024-05-19

## 2024-05-19 PROBLEM — T14.8XXA BEDBUG BITE WITH INFECTION: Status: RESOLVED | Noted: 2024-03-09 | Resolved: 2024-05-19

## 2024-05-19 PROBLEM — N17.9 AKI (ACUTE KIDNEY INJURY): Status: RESOLVED | Noted: 2018-10-15 | Resolved: 2024-05-19

## 2024-05-19 PROBLEM — L08.9 BEDBUG BITE WITH INFECTION: Status: RESOLVED | Noted: 2024-03-09 | Resolved: 2024-05-19

## 2024-05-19 PROBLEM — Z95.0 PACEMAKER: Chronic | Status: RESOLVED | Noted: 2024-04-02 | Resolved: 2024-05-19

## 2024-05-19 PROBLEM — R50.9 FEVER: Status: RESOLVED | Noted: 2024-04-01 | Resolved: 2024-05-19

## 2024-05-19 LAB
ALBUMIN SERPL BCP-MCNC: 1.9 G/DL (ref 3.5–5.2)
ALP SERPL-CCNC: 71 U/L (ref 55–135)
ALT SERPL W/O P-5'-P-CCNC: 48 U/L (ref 10–44)
AMMONIA PLAS-SCNC: 26 UMOL/L (ref 10–50)
ANION GAP SERPL CALC-SCNC: 13 MMOL/L (ref 8–16)
APTT PPP: 39.6 SEC (ref 21–32)
AST SERPL-CCNC: 50 U/L (ref 10–40)
BACTERIA #/AREA URNS HPF: ABNORMAL /HPF
BASOPHILS # BLD AUTO: 0.04 K/UL (ref 0–0.2)
BASOPHILS NFR BLD: 0.3 % (ref 0–1.9)
BILIRUB SERPL-MCNC: 0.5 MG/DL (ref 0.1–1)
BILIRUB UR QL STRIP: NEGATIVE
BNP SERPL-MCNC: 313 PG/ML (ref 0–99)
BUN SERPL-MCNC: 16 MG/DL (ref 8–23)
CALCIUM SERPL-MCNC: 8.9 MG/DL (ref 8.7–10.5)
CHLORIDE SERPL-SCNC: 99 MMOL/L (ref 95–110)
CLARITY UR: ABNORMAL
CO2 SERPL-SCNC: 26 MMOL/L (ref 23–29)
COLOR UR: YELLOW
CREAT SERPL-MCNC: 1.3 MG/DL (ref 0.5–1.4)
CRP SERPL-MCNC: 195.3 MG/L (ref 0–8.2)
DIFFERENTIAL METHOD BLD: ABNORMAL
EOSINOPHIL # BLD AUTO: 0 K/UL (ref 0–0.5)
EOSINOPHIL NFR BLD: 0 % (ref 0–8)
ERYTHROCYTE [DISTWIDTH] IN BLOOD BY AUTOMATED COUNT: 19.8 % (ref 11.5–14.5)
EST. GFR  (NO RACE VARIABLE): 57 ML/MIN/1.73 M^2
FIO2: 21 %
GLUCOSE SERPL-MCNC: 200 MG/DL (ref 70–110)
GLUCOSE UR QL STRIP: NEGATIVE
HCT VFR BLD AUTO: 26.2 % (ref 40–54)
HCT VFR BLD CALC: 26.2 % (ref 36–54)
HGB BLD-MCNC: 8.3 G/DL (ref 14–18)
HGB BLD-MCNC: 8.6 G/DL (ref 9–18)
HGB UR QL STRIP: ABNORMAL
HYALINE CASTS #/AREA URNS LPF: 0 /LPF
IMM GRANULOCYTES # BLD AUTO: 0.05 K/UL (ref 0–0.04)
IMM GRANULOCYTES NFR BLD AUTO: 0.3 % (ref 0–0.5)
INFLUENZA A, MOLECULAR: NEGATIVE
INFLUENZA B, MOLECULAR: NEGATIVE
INR PPP: 1.3 (ref 0.8–1.2)
KETONES UR QL STRIP: ABNORMAL
LACTATE SERPL-SCNC: 1 MMOL/L (ref 0.5–2.2)
LDH SERPL L TO P-CCNC: 1 MMOL/L (ref 0.5–2.2)
LEUKOCYTE ESTERASE UR QL STRIP: ABNORMAL
LIPASE SERPL-CCNC: 17 U/L (ref 4–60)
LYMPHOCYTES # BLD AUTO: 1.3 K/UL (ref 1–4.8)
LYMPHOCYTES NFR BLD: 8.5 % (ref 18–48)
MAGNESIUM SERPL-MCNC: 1.8 MG/DL (ref 1.6–2.6)
MCH RBC QN AUTO: 25.6 PG (ref 27–31)
MCHC RBC AUTO-ENTMCNC: 31.7 G/DL (ref 32–36)
MCV RBC AUTO: 81 FL (ref 82–98)
MICROSCOPIC COMMENT: ABNORMAL
MONOCYTES # BLD AUTO: 0.4 K/UL (ref 0.3–1)
MONOCYTES NFR BLD: 2.6 % (ref 4–15)
NEUTROPHILS # BLD AUTO: 13.4 K/UL (ref 1.8–7.7)
NEUTROPHILS NFR BLD: 88.3 % (ref 38–73)
NITRITE UR QL STRIP: NEGATIVE
NRBC BLD-RTO: 0 /100 WBC
PCO2 BLDA: 39.4 MMHG (ref 35–45)
PH SMN: 7.5 [PH] (ref 7.35–7.45)
PH UR STRIP: 6 [PH] (ref 5–8)
PHOSPHATE SERPL-MCNC: 2.5 MG/DL (ref 2.7–4.5)
PLATELET # BLD AUTO: 413 K/UL (ref 150–450)
PMV BLD AUTO: 8.8 FL (ref 9.2–12.9)
PO2 BLDA: 36 MMHG (ref 40–60)
POC BASE DEFICIT: 7.1 MMOL/L (ref -2–2)
POC HCO3: 30.8 MMOL/L (ref 24–28)
POC IONIZED CALCIUM: 1.18 MMOL/L (ref 1.06–1.42)
POC PERFORMED BY: ABNORMAL
POC SATURATED O2: 72 % (ref 95–100)
POTASSIUM BLD-SCNC: 3.3 MMOL/L (ref 3.5–5.1)
POTASSIUM SERPL-SCNC: 3.3 MMOL/L (ref 3.5–5.1)
PROCALCITONIN SERPL IA-MCNC: 0.9 NG/ML
PROT SERPL-MCNC: 7.4 G/DL (ref 6–8.4)
PROT UR QL STRIP: ABNORMAL
PROTHROMBIN TIME: 13.7 SEC (ref 9–12.5)
RBC # BLD AUTO: 3.24 M/UL (ref 4.6–6.2)
RBC #/AREA URNS HPF: >100 /HPF (ref 0–4)
SODIUM BLD-SCNC: 139 MMOL/L (ref 136–145)
SODIUM SERPL-SCNC: 138 MMOL/L (ref 136–145)
SP GR UR STRIP: 1.02 (ref 1–1.03)
SPECIMEN SOURCE: ABNORMAL
SPECIMEN SOURCE: NORMAL
SQUAMOUS #/AREA URNS HPF: 6 /HPF
TROPONIN I SERPL DL<=0.01 NG/ML-MCNC: 0.06 NG/ML (ref 0–0.03)
URN SPEC COLLECT METH UR: ABNORMAL
UROBILINOGEN UR STRIP-ACNC: NEGATIVE EU/DL
WBC # BLD AUTO: 15.13 K/UL (ref 3.9–12.7)
WBC #/AREA URNS HPF: >100 /HPF (ref 0–5)
YEAST URNS QL MICRO: ABNORMAL

## 2024-05-19 PROCEDURE — 84484 ASSAY OF TROPONIN QUANT: CPT | Mod: 91 | Performed by: EMERGENCY MEDICINE

## 2024-05-19 PROCEDURE — 25000003 PHARM REV CODE 250: Performed by: EMERGENCY MEDICINE

## 2024-05-19 PROCEDURE — 83880 ASSAY OF NATRIURETIC PEPTIDE: CPT | Performed by: EMERGENCY MEDICINE

## 2024-05-19 PROCEDURE — 86140 C-REACTIVE PROTEIN: CPT | Performed by: EMERGENCY MEDICINE

## 2024-05-19 PROCEDURE — 87088 URINE BACTERIA CULTURE: CPT | Performed by: EMERGENCY MEDICINE

## 2024-05-19 PROCEDURE — 83605 ASSAY OF LACTIC ACID: CPT | Performed by: EMERGENCY MEDICINE

## 2024-05-19 PROCEDURE — 87040 BLOOD CULTURE FOR BACTERIA: CPT | Performed by: EMERGENCY MEDICINE

## 2024-05-19 PROCEDURE — 84100 ASSAY OF PHOSPHORUS: CPT | Performed by: EMERGENCY MEDICINE

## 2024-05-19 PROCEDURE — 83605 ASSAY OF LACTIC ACID: CPT

## 2024-05-19 PROCEDURE — 83735 ASSAY OF MAGNESIUM: CPT | Performed by: EMERGENCY MEDICINE

## 2024-05-19 PROCEDURE — 96365 THER/PROPH/DIAG IV INF INIT: CPT

## 2024-05-19 PROCEDURE — 80053 COMPREHEN METABOLIC PANEL: CPT | Performed by: EMERGENCY MEDICINE

## 2024-05-19 PROCEDURE — 82803 BLOOD GASES ANY COMBINATION: CPT

## 2024-05-19 PROCEDURE — 83690 ASSAY OF LIPASE: CPT | Performed by: EMERGENCY MEDICINE

## 2024-05-19 PROCEDURE — 99900035 HC TECH TIME PER 15 MIN (STAT)

## 2024-05-19 PROCEDURE — 63600175 PHARM REV CODE 636 W HCPCS: Performed by: EMERGENCY MEDICINE

## 2024-05-19 PROCEDURE — 85730 THROMBOPLASTIN TIME PARTIAL: CPT | Performed by: EMERGENCY MEDICINE

## 2024-05-19 PROCEDURE — 85610 PROTHROMBIN TIME: CPT | Performed by: EMERGENCY MEDICINE

## 2024-05-19 PROCEDURE — 84145 PROCALCITONIN (PCT): CPT | Performed by: EMERGENCY MEDICINE

## 2024-05-19 PROCEDURE — 87106 FUNGI IDENTIFICATION YEAST: CPT | Performed by: EMERGENCY MEDICINE

## 2024-05-19 PROCEDURE — 87086 URINE CULTURE/COLONY COUNT: CPT | Performed by: EMERGENCY MEDICINE

## 2024-05-19 PROCEDURE — 96367 TX/PROPH/DG ADDL SEQ IV INF: CPT

## 2024-05-19 PROCEDURE — 81000 URINALYSIS NONAUTO W/SCOPE: CPT | Performed by: EMERGENCY MEDICINE

## 2024-05-19 PROCEDURE — 99285 EMERGENCY DEPT VISIT HI MDM: CPT | Mod: 25

## 2024-05-19 PROCEDURE — 82140 ASSAY OF AMMONIA: CPT | Performed by: EMERGENCY MEDICINE

## 2024-05-19 PROCEDURE — 87150 DNA/RNA AMPLIFIED PROBE: CPT | Performed by: EMERGENCY MEDICINE

## 2024-05-19 PROCEDURE — 85025 COMPLETE CBC W/AUTO DIFF WBC: CPT | Performed by: EMERGENCY MEDICINE

## 2024-05-19 PROCEDURE — 84484 ASSAY OF TROPONIN QUANT: CPT | Performed by: STUDENT IN AN ORGANIZED HEALTH CARE EDUCATION/TRAINING PROGRAM

## 2024-05-19 PROCEDURE — 87502 INFLUENZA DNA AMP PROBE: CPT | Performed by: EMERGENCY MEDICINE

## 2024-05-19 PROCEDURE — 93005 ELECTROCARDIOGRAM TRACING: CPT

## 2024-05-19 PROCEDURE — 12000002 HC ACUTE/MED SURGE SEMI-PRIVATE ROOM

## 2024-05-19 PROCEDURE — 93010 ELECTROCARDIOGRAM REPORT: CPT | Mod: ,,, | Performed by: INTERNAL MEDICINE

## 2024-05-19 RX ORDER — ACETAMINOPHEN 650 MG/1
650 SUPPOSITORY RECTAL
Status: COMPLETED | OUTPATIENT
Start: 2024-05-19 | End: 2024-05-19

## 2024-05-19 RX ORDER — NALOXONE HCL 0.4 MG/ML
0.02 VIAL (ML) INJECTION
Status: DISCONTINUED | OUTPATIENT
Start: 2024-05-20 | End: 2024-05-24 | Stop reason: HOSPADM

## 2024-05-19 RX ORDER — SODIUM CHLORIDE 0.9 % (FLUSH) 0.9 %
10 SYRINGE (ML) INJECTION EVERY 12 HOURS PRN
Status: DISCONTINUED | OUTPATIENT
Start: 2024-05-20 | End: 2024-05-24 | Stop reason: HOSPADM

## 2024-05-19 RX ADMIN — IOHEXOL 100 ML: 350 INJECTION, SOLUTION INTRAVENOUS at 11:05

## 2024-05-19 RX ADMIN — CEFEPIME 2 G: 2 INJECTION, POWDER, FOR SOLUTION INTRAVENOUS at 08:05

## 2024-05-19 RX ADMIN — SODIUM CHLORIDE 2000 ML: 9 INJECTION, SOLUTION INTRAVENOUS at 08:05

## 2024-05-19 RX ADMIN — ACETAMINOPHEN 650 MG: 650 SUPPOSITORY RECTAL at 08:05

## 2024-05-19 RX ADMIN — VANCOMYCIN HYDROCHLORIDE 2250 MG: 500 INJECTION, POWDER, LYOPHILIZED, FOR SOLUTION INTRAVENOUS at 09:05

## 2024-05-20 LAB
ALBUMIN SERPL BCP-MCNC: 1.8 G/DL (ref 3.5–5.2)
ALP SERPL-CCNC: 67 U/L (ref 55–135)
ALT SERPL W/O P-5'-P-CCNC: 46 U/L (ref 10–44)
ANION GAP SERPL CALC-SCNC: 16 MMOL/L (ref 8–16)
AST SERPL-CCNC: 49 U/L (ref 10–40)
BASOPHILS # BLD AUTO: 0.03 K/UL (ref 0–0.2)
BASOPHILS NFR BLD: 0.2 % (ref 0–1.9)
BILIRUB SERPL-MCNC: 0.5 MG/DL (ref 0.1–1)
BUN SERPL-MCNC: 16 MG/DL (ref 8–23)
CALCIUM SERPL-MCNC: 8.5 MG/DL (ref 8.7–10.5)
CHLORIDE SERPL-SCNC: 100 MMOL/L (ref 95–110)
CO2 SERPL-SCNC: 20 MMOL/L (ref 23–29)
CREAT SERPL-MCNC: 1.2 MG/DL (ref 0.5–1.4)
DIFFERENTIAL METHOD BLD: ABNORMAL
EOSINOPHIL # BLD AUTO: 0 K/UL (ref 0–0.5)
EOSINOPHIL NFR BLD: 0.1 % (ref 0–8)
ERYTHROCYTE [DISTWIDTH] IN BLOOD BY AUTOMATED COUNT: 19.9 % (ref 11.5–14.5)
EST. GFR  (NO RACE VARIABLE): >60 ML/MIN/1.73 M^2
GLUCOSE SERPL-MCNC: 305 MG/DL (ref 70–110)
HCT VFR BLD AUTO: 29 % (ref 40–54)
HGB BLD-MCNC: 8.7 G/DL (ref 14–18)
IMM GRANULOCYTES # BLD AUTO: 0.1 K/UL (ref 0–0.04)
IMM GRANULOCYTES NFR BLD AUTO: 0.6 % (ref 0–0.5)
LACTATE SERPL-SCNC: 1 MMOL/L (ref 0.5–2.2)
LYMPHOCYTES # BLD AUTO: 1 K/UL (ref 1–4.8)
LYMPHOCYTES NFR BLD: 6.4 % (ref 18–48)
MAGNESIUM SERPL-MCNC: 1.8 MG/DL (ref 1.6–2.6)
MCH RBC QN AUTO: 24.9 PG (ref 27–31)
MCHC RBC AUTO-ENTMCNC: 30 G/DL (ref 32–36)
MCV RBC AUTO: 83 FL (ref 82–98)
MONOCYTES # BLD AUTO: 0.2 K/UL (ref 0.3–1)
MONOCYTES NFR BLD: 1.4 % (ref 4–15)
NEUTROPHILS # BLD AUTO: 14.6 K/UL (ref 1.8–7.7)
NEUTROPHILS NFR BLD: 91.3 % (ref 38–73)
NRBC BLD-RTO: 0 /100 WBC
PHOSPHATE SERPL-MCNC: 3.7 MG/DL (ref 2.7–4.5)
PLATELET # BLD AUTO: 394 K/UL (ref 150–450)
PMV BLD AUTO: 9 FL (ref 9.2–12.9)
POCT GLUCOSE: 175 MG/DL (ref 70–110)
POCT GLUCOSE: 229 MG/DL (ref 70–110)
POCT GLUCOSE: 308 MG/DL (ref 70–110)
POCT GLUCOSE: 342 MG/DL (ref 70–110)
POTASSIUM SERPL-SCNC: 3.5 MMOL/L (ref 3.5–5.1)
PROT SERPL-MCNC: 7.1 G/DL (ref 6–8.4)
RBC # BLD AUTO: 3.5 M/UL (ref 4.6–6.2)
SODIUM SERPL-SCNC: 136 MMOL/L (ref 136–145)
TROPONIN I SERPL DL<=0.01 NG/ML-MCNC: 0.06 NG/ML (ref 0–0.03)
WBC # BLD AUTO: 15.97 K/UL (ref 3.9–12.7)

## 2024-05-20 PROCEDURE — 63600175 PHARM REV CODE 636 W HCPCS: Performed by: STUDENT IN AN ORGANIZED HEALTH CARE EDUCATION/TRAINING PROGRAM

## 2024-05-20 PROCEDURE — 63600175 PHARM REV CODE 636 W HCPCS: Performed by: INTERNAL MEDICINE

## 2024-05-20 PROCEDURE — 83605 ASSAY OF LACTIC ACID: CPT | Performed by: EMERGENCY MEDICINE

## 2024-05-20 PROCEDURE — 25000003 PHARM REV CODE 250

## 2024-05-20 PROCEDURE — 63600175 PHARM REV CODE 636 W HCPCS: Mod: JZ,JG | Performed by: EMERGENCY MEDICINE

## 2024-05-20 PROCEDURE — 84100 ASSAY OF PHOSPHORUS: CPT | Performed by: STUDENT IN AN ORGANIZED HEALTH CARE EDUCATION/TRAINING PROGRAM

## 2024-05-20 PROCEDURE — 11000001 HC ACUTE MED/SURG PRIVATE ROOM

## 2024-05-20 PROCEDURE — 25000003 PHARM REV CODE 250: Performed by: STUDENT IN AN ORGANIZED HEALTH CARE EDUCATION/TRAINING PROGRAM

## 2024-05-20 PROCEDURE — 83735 ASSAY OF MAGNESIUM: CPT | Performed by: STUDENT IN AN ORGANIZED HEALTH CARE EDUCATION/TRAINING PROGRAM

## 2024-05-20 PROCEDURE — 80053 COMPREHEN METABOLIC PANEL: CPT | Performed by: STUDENT IN AN ORGANIZED HEALTH CARE EDUCATION/TRAINING PROGRAM

## 2024-05-20 PROCEDURE — 25000003 PHARM REV CODE 250: Performed by: INTERNAL MEDICINE

## 2024-05-20 PROCEDURE — 85025 COMPLETE CBC W/AUTO DIFF WBC: CPT | Performed by: STUDENT IN AN ORGANIZED HEALTH CARE EDUCATION/TRAINING PROGRAM

## 2024-05-20 PROCEDURE — 25500020 PHARM REV CODE 255: Performed by: INTERNAL MEDICINE

## 2024-05-20 RX ORDER — INSULIN ASPART 100 [IU]/ML
0-10 INJECTION, SOLUTION INTRAVENOUS; SUBCUTANEOUS
Status: DISCONTINUED | OUTPATIENT
Start: 2024-05-20 | End: 2024-05-20

## 2024-05-20 RX ORDER — IBUPROFEN 200 MG
24 TABLET ORAL
Status: DISCONTINUED | OUTPATIENT
Start: 2024-05-20 | End: 2024-05-24 | Stop reason: HOSPADM

## 2024-05-20 RX ORDER — MAGNESIUM SULFATE HEPTAHYDRATE 40 MG/ML
2 INJECTION, SOLUTION INTRAVENOUS ONCE
Status: COMPLETED | OUTPATIENT
Start: 2024-05-20 | End: 2024-05-20

## 2024-05-20 RX ORDER — INSULIN GLARGINE 100 [IU]/ML
10 INJECTION, SOLUTION SUBCUTANEOUS DAILY
Status: DISCONTINUED | OUTPATIENT
Start: 2024-05-20 | End: 2024-05-20

## 2024-05-20 RX ORDER — IBUPROFEN 200 MG
16 TABLET ORAL
Status: DISCONTINUED | OUTPATIENT
Start: 2024-05-20 | End: 2024-05-24 | Stop reason: HOSPADM

## 2024-05-20 RX ORDER — INSULIN ASPART 100 [IU]/ML
0-5 INJECTION, SOLUTION INTRAVENOUS; SUBCUTANEOUS
Status: DISCONTINUED | OUTPATIENT
Start: 2024-05-20 | End: 2024-05-24 | Stop reason: HOSPADM

## 2024-05-20 RX ORDER — METRONIDAZOLE 500 MG/100ML
500 INJECTION, SOLUTION INTRAVENOUS
Status: DISCONTINUED | OUTPATIENT
Start: 2024-05-20 | End: 2024-05-22

## 2024-05-20 RX ORDER — GLUCAGON 1 MG
1 KIT INJECTION
Status: DISCONTINUED | OUTPATIENT
Start: 2024-05-20 | End: 2024-05-24 | Stop reason: HOSPADM

## 2024-05-20 RX ORDER — POTASSIUM CHLORIDE 7.45 MG/ML
10 INJECTION INTRAVENOUS
Status: DISPENSED | OUTPATIENT
Start: 2024-05-20 | End: 2024-05-20

## 2024-05-20 RX ORDER — HYDROCODONE BITARTRATE AND ACETAMINOPHEN 5; 325 MG/1; MG/1
1 TABLET ORAL EVERY 6 HOURS PRN
Status: DISCONTINUED | OUTPATIENT
Start: 2024-05-20 | End: 2024-05-21

## 2024-05-20 RX ADMIN — POTASSIUM CHLORIDE 10 MEQ: 7.46 INJECTION, SOLUTION INTRAVENOUS at 09:05

## 2024-05-20 RX ADMIN — POTASSIUM CHLORIDE 10 MEQ: 7.46 INJECTION, SOLUTION INTRAVENOUS at 01:05

## 2024-05-20 RX ADMIN — INSULIN ASPART 4 UNITS: 100 INJECTION, SOLUTION INTRAVENOUS; SUBCUTANEOUS at 11:05

## 2024-05-20 RX ADMIN — METRONIDAZOLE 500 MG: 500 INJECTION, SOLUTION INTRAVENOUS at 02:05

## 2024-05-20 RX ADMIN — POTASSIUM PHOSPHATE, MONOBASIC AND POTASSIUM PHOSPHATE, DIBASIC 15 MMOL: 224; 236 INJECTION, SOLUTION, CONCENTRATE INTRAVENOUS at 04:05

## 2024-05-20 RX ADMIN — INSULIN DETEMIR 10 UNITS: 100 INJECTION, SOLUTION SUBCUTANEOUS at 08:05

## 2024-05-20 RX ADMIN — METRONIDAZOLE 500 MG: 500 INJECTION, SOLUTION INTRAVENOUS at 10:05

## 2024-05-20 RX ADMIN — CEFEPIME 2 G: 2 INJECTION, POWDER, FOR SOLUTION INTRAVENOUS at 10:05

## 2024-05-20 RX ADMIN — MAGNESIUM SULFATE HEPTAHYDRATE 2 G: 40 INJECTION, SOLUTION INTRAVENOUS at 08:05

## 2024-05-20 RX ADMIN — APIXABAN 5 MG: 5 TABLET, FILM COATED ORAL at 09:05

## 2024-05-20 RX ADMIN — INSULIN ASPART 4 UNITS: 100 INJECTION, SOLUTION INTRAVENOUS; SUBCUTANEOUS at 08:05

## 2024-05-20 RX ADMIN — CEFEPIME 2 G: 2 INJECTION, POWDER, FOR SOLUTION INTRAVENOUS at 08:05

## 2024-05-20 RX ADMIN — METRONIDAZOLE 500 MG: 500 INJECTION, SOLUTION INTRAVENOUS at 04:05

## 2024-05-20 RX ADMIN — APIXABAN 5 MG: 5 TABLET, FILM COATED ORAL at 08:05

## 2024-05-20 RX ADMIN — POTASSIUM CHLORIDE 10 MEQ: 7.46 INJECTION, SOLUTION INTRAVENOUS at 12:05

## 2024-05-20 RX ADMIN — POTASSIUM CHLORIDE 10 MEQ: 7.46 INJECTION, SOLUTION INTRAVENOUS at 08:05

## 2024-05-20 RX ADMIN — POTASSIUM CHLORIDE 10 MEQ: 7.46 INJECTION, SOLUTION INTRAVENOUS at 10:05

## 2024-05-20 RX ADMIN — HYDROCODONE BITARTRATE AND ACETAMINOPHEN 1 TABLET: 5; 325 TABLET ORAL at 08:05

## 2024-05-20 RX ADMIN — INSULIN ASPART 2 UNITS: 100 INJECTION, SOLUTION INTRAVENOUS; SUBCUTANEOUS at 04:05

## 2024-05-20 NOTE — ED NOTES
Pt rolled to be cleaned and to administer rectal tylenol. Pt w/ multiple large wounds to coccyx and buttox. Old dressings appeared soiled and were removed. Largest wound is to coccyx, tunneling noted to this wound in multiple areas w/ escrow tissue in multiple areas noted as well. Pt cleansed. Clean mepiplex's applied. Provider notified and has looked at wounds. Pt also w/ wound r R heal. All wounds are malodorous and with purulent drainage. Will place order for wound consult. Sams changed out at this time to obtain clean urine sample. Pt tolerated well. Sterile technique maintained.

## 2024-05-20 NOTE — PLAN OF CARE
05/20/24 1332   Admission   Initial VN Admission Questions Incomplete  (Patient asleep and disoriented)   Shift   Virtual Nurse - Rounding Complete   Pain Management Interventions quiet environment facilitated   Virtual Nurse - Patient Verbalized Approval Of Camera Use;VN Rounding  (asleep and disoriented)   Type of Frequent Check   Type Patient Rounds;Telemetry Monitoring   Safety/Activity   Patient Rounds bed in low position;bed wheels locked;call light in patient/parent reach;clutter free environment maintained;visualized patient;placement of personal items at bedside;ID band on   Safety Promotion/Fall Prevention assistive device/personal item within reach;bed alarm set;medications reviewed;side rails raised x 2   Positioning   Body Position supine   Head of Bed (HOB) Positioning HOB elevated   Cardiac   Cardiac/Telemetry Monitor On Yes   ECG   Rhythm sinus bradycardia     VN cued into patient's room for introduction.  Patient asleep with mouth wide open. Respirations even and unlabored. Patient does not arouse to VN voice on camera. VN noted flowsheet documentation of patient disorientation to situation. Admission questions answered from prior medical record and current physician H&P. VN contacted [facility] for copy of current medication list for reconciliation. Patient's chart, labs and vital signs reviewed.  No acute distress noted.  Will continue to be available as needed.

## 2024-05-20 NOTE — H&P
[facility] Hospital Medicine H&P Note     Admitting Team: [facility] Hospitalist Team B  Attending Physician: Kumar Valdes MD  Resident: Marla Rodriguez MD     Date of Admit: 5/19/2024    Chief Complaint     Acute Encephalopathy for 1 day.    Subjective:      History of Present Illness:  Saji Castañeda is a 74 y.o. male with medical comorbidities of T2DM on long-term insulin, PVD with diabetic ulcer s/p left AKA (3/27/2024), Chronic Multifactorial Anemia, BPH s/p Chronic Fung, paroxysmal A-fib on eliquis, CHB s/p PPM (10/12/2023), CKD3, Multifactorial HTN, and HLD who presented to [facility] on 5/19/2024 with a primary complaint of Acute Encephalopathy for 1 day.    The patient was in their usual state of health until this morning when the nursing staff at [facility] noted the patient to be acutely encephalopathic associated with fever and sacral pain.  Patient unable to provide history overall due to current mental status.    Reported during ED Course, unstagable sacral ulcers and chronic fung with cloudy UOP.      Past Medical History:  See above in HPI.    Past Surgical History:  Past Surgical History:   Procedure Laterality Date    ABOVE-KNEE AMPUTATION Left 3/27/2024    Procedure: AMPUTATION, ABOVE KNEE;  Surgeon: Adal Arellano MD;  Location: [address];  Service: Vascular;  Laterality: Left;    ANGIOGRAPHY OF LOWER EXTREMITY N/A 2/3/2021    Procedure: Angiogram Extremity Bilateral;  Surgeon: Ernst Chacko MD;  Location: [address];  Service: Peripheral Vascular;  Laterality: N/A;  7.4 mintues fluoroscopy time  816.15 mGy  170.17 Gycm2    AORTOGRAPHY WITH EXTREMITY RUNOFF Bilateral 2/3/2021    Procedure: AORTOGRAM, WITH EXTREMITY RUNOFF;  Surgeon: Ernst Chacko MD;  Location: [address];  Service: Peripheral Vascular;  Laterality: Bilateral;    DEBRIDEMENT OF FOOT Left 2/23/2021    Procedure: DEBRIDEMENT, LEFT HEEL;  Surgeon: Mayra Schroeder  DPM;  [facility];  Service: Podiatry;  Laterality: Left;    FOOT AMPUTATION  2010    left high midfoot amputation    IMPLANTATION OF LEADLESS PACEMAKER N/A 10/12/2023    Procedure: [id number];  Surgeon: VANESSA Delatorre MD;  Location: [facility] EP LAB;  Service: Cardiology;  Laterality: N/A;  AVB, MICRA, EH, [place]ES, [place] [id number]       Allergies:  NKDA    Home Medications: Per EMR Review.  Crestor 40mg daily  Eliquis 5mg BID  Nifedipine 90mg daily  Hydrochlorothiazide 25mg daily  Isordil 10mg TID  Insulin Detemir 12U BID  Insulin Aspart 5U TIDAC  Protonix 40mg daily  Tamulosin 0.4mg daily  Norco 5-325 q8h  Iron supplement BID  Multivitamin  Vitamin C  Vitamin D3  Bactrim 800-160mg    Family History:  Deferred.    Social History:  Social History     Tobacco Use    Smoking status: Never    Smokeless tobacco: Never   Substance Use Topics    Alcohol use: No     Comment: occassional    Drug use: No       Review of Systems:  All other systems are reviewed and are negative.    Health Maintaince :   Primary Care Physician: Unknown       Objective:   Last 24 Hour Vital Signs:  BP  Min: 117/57  Max: 132/94  Temp  Av.4 °F (38.6 °C)  Min: 100.4 °F (38 °C)  Max: 102.3 °F (39.1 °C)  Pulse  Av  Min: 82  Max: 110  Resp  Av.7  Min: 22  Max: 27  SpO2  Av.3 %  Min: 92 %  Max: 96 %  Weight  Av.2 kg (223 lb)  Min: 101.2 kg (223 lb)  Max: 101.2 kg (223 lb)  Body mass index is 35.99 kg/m².  No intake/output data recorded.    ED Course:  Received 2L NS, Tylenol 650mg suppository, and was started on Vanc, Cefepime, and flagyl; Bcxs and Ucx obtained prior to Abx infusion.    Physical Examination:  General: A&Ox1, NAD, RASS 0.  Head: Normocephalic, atraumatic, without obvious abnormality.  Eyes: Conjunctivae/corneas clear, anicteric sclera.  Mouth: OP without erythema nor exudates, MMM.  Poor dentition.  Lungs: Non-labored breathing, Symmetric chest rise, CTAB.  Heart: RRR, No  murmurs, rub, nor click.  Abdomen: Soft, NT, ND, no obvious masses and/or organomegaly.  No peritoneal signs.  : No suprapubic tenderness nor distention.  No obvious urethral discharge.  Sams in place.  Extremities: Left AKA with incision site without signs of infection.  Vascular: No obvious cyanosis, pallor, nor edema.   Skin: See images below in regards to multiple unstageable ulcers and Left Hallux infection.  Neurologic: No gross focal neurological deficits nor gross muscle weakness.  Psychiatric: Appropriate affect.          Laboratory:  Reviewed.    Microbiology Data:  Reviewed.    Other Results:  EKG (my interpretation): Unchanged from prior EKG, Qtc 467.    Radiology:  Reviewed.       Assessment/Plan:     Saji Castañeda is a 74 y.o. 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  Initial evaluation pertinent for multiple pressure ulcers, now on broad coverage Abx.      Unstageable Sacral Pressure Ulcer x2  # Suspected source(s) of infection  Unstageable Right Heel Pressure Ulcer  Right Hallux Infection  On admission, patient febrile with malodorous pressure ulcers on physical exam and leukocytosis on labs.  - continue Vanc, cefepime, and flagyl; will need to tailor to Cx if growth and/or broaden with meropenem if patient deteriorates clinically.  - ID consult in the morning.  - follow up Bcxs and Ucx.  - WC consulted, appreciate assistance.  - MRI foot in morning.  - holding tylenol to watch fever curve incase we need to broaden coverage with Meropenem.    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  Got 2L NS on admission.  - see above  for management.  - CTM    Elevated Troponin  # Suspected Type-II MI  - trend to peak.    T2DM on long-term insulin  # Immunocompromised  Last A1c >14 2 months prior to admission.  - hold all antidiabetic agents  - starting Lantus 12U daily (half of home dose).  - low dose SSI with hypoglycemic precautions.    PVD with diabetic ulcer s/p left AKA (3/27/2024)  Likely has ASCVD on Right side also, will make adequate perfusion for healing difficult.  - CTM    BPH s/p Chronic Fung  Fung changed in ED, noted to of had potential purulence (documented as cloudy UOP).  - see above in regards to potential UTI.  - continue fung care.    Chronic Multifactorial Anemia  On admission, Hgb stable at baseline; 8.3 and 7-8 respectively.  - CTM    Mild Hypokalemia  - replete prn    Paroxysmal A-fib on eliquis  Currently paced at the AV Node  - continue home eliquis  - CTM    CHB s/p PPM (10/12/2023)  - CTM    Multifactorial HTN  - hold home antihypertensives.    HLD  - continue home statin.      Diet: Diabetic/Cardiac  Telemetry: no  VTE Ppx: Home Eliquis, SCDs  Code Status: FULL    Dispo: Admit to floor  Estimated LOS: 2-5 days     Pending Attestation by staff.    Marla Rodriguez MD  [facility] Internal Medicine HO-II    [facility] Medicine Hospitalist Pager numbers:   [facility] Hospitalist Medicine Team A (Lorne/Riki): [phone]  [facility] Hospitalist Medicine Team B (Arik/Lucio):  [phone]

## 2024-05-20 NOTE — PLAN OF CARE
05/20/24 1235   Admission   Initial VN Admission Questions Incomplete  (Patient currently off unit)     VN note:   Patient's chart, labs and vital signs reviewed, will be available to intervene if needed.

## 2024-05-20 NOTE — ED NOTES
Code sepsis called on arrival to room. Two sets of blood cultures obtained. IV abx and fluids infusing per MAR. VSS on cardiac and SPO2 monitor. This RN attempted x 2 to obtain 2nd IV access unsuccessfully. Pt instable condition at this time. Respirations regular/unlaobred, skin warm/dry. XR at bedside at this time. Plan to switch out fung to obtain UA. Will continue to monitor closely.

## 2024-05-20 NOTE — CONSULTS
[facility] Consult Note    Primary Attending Physician: Kumra Valdes MD     Reason for Consult:     Concern for sacral and right heel osteomyelitis the patient with fever and altered mental status.    Assessment (see problem list below):     Saji Castañeda is a 74 y.o. male with:  Fever and leukocytosis.  Workup process.  Patient pyuria with some bacteria and yeast.  Patient also sacral wound and heel ulcer.  Patient has prior history of multi drug-resistant polymicrobial bacteremia in October 2023.  Patient currently on vancomycin, cefepime, metronidazole.  Cultures in process  Chronic Sams with multiple positive urine cultures..  May need investigation of alternative to Sasm catheter given its chronicity and repeat positive cultures  Chronic sacral wound and right lower extremity wound.  Concern for osteomyelitis.  Wound photographs reviewed.  Also with plain films that might be suggestive right heel osteomyelitis.  Unable to get MRI because of pacemaker.  Diabetes, hypertension, hyperlipidemia, peripheral artery disease, left AKA, pacemaker, chronic Smas, leadless pacemaker for third-degree heart block      Plan:     Can continue present antibiotics  Wound care as you were doing  Would assess arterial circulation to right lower extremity to assess ability to heal if desire is to continue therapy for osteomyelitis  Follow up cultures  Goals of care discussion  Monitor patient progress    Thank you for allowing us to participate in the care of this patient. Please contact me if you have any questions regarding this consult.    Sameer Gayle  [facility]  Pager: [phone]    Subjective:      History of Present Illness:  Saji Castañeda is a 74 y.o. male with diabetes, hypertension, hyperlipidemia, peripheral artery disease, left AKA, leadless pacemaker, chronic Sams with multiple urinary tract infections presents with fever and mental status changes after recent admission    Patient known to  our service from prior admission.  Please see my consult note from 05/10/2024 for information prior to that admission.    05/08/2024 patient had been transferred from nursing home because of cloudy urine.  White count was [id number].  Patient was afebrile.  Blood cultures were negative.  Urine culture was with Providencia stuartii which was susceptible to ceftriaxone and Bactrim.  Patient was discharged on 05/13/2024 to complete 10 day course of Bactrim.    05/19/2024 patient transferred from nursing home because fever and mental status changes with tachycardia.  Upon evaluation, patient had a temperature of 102.3°.  WBC 15.13.  Abdominal CT showed gallstone with no evidence of cholecystitis.  Thickened bladder and prostatomegaly.  Right foot x-ray showed some questionable osteomyelitis in the right calcaneus.  Blood cultures and urine cultures were obtained.  Blood cultures negative so far.  Urine culture in process  Patient was empirically started on vancomycin, cefepime, metronidazole.    Infectious disease was consulted for concern for osteomyelitis of the sacrum and right heel    Review of positive cultures:  Urine culture 05/08/2024 Providencia stuartii   Left leg wound culture 03/06/2024 Proteus mirabilis Enterobacter cloacae, fusarium, Candida albicans  Left foot wound 10/03/2023 Candida albicans achromobacter  Blood cultures 10/02/2023 MDRO Acinetobacter ESBL Klebsiella pneumoniae coag-negative staph  Right foot wound culture 05/16/2023 Pseudomonas aeruginosa Klebsiella pneumoniae  Right foot wound culture 05/16/2023 Proteus mirabilis Klebsiella pneumoniae Proteus   Urine culture 05/15/2023 Klebsiella pneumoniae  Left wound leg culture 03/10/2023 Proteus mirabilis  Blood culture 03/09/2023 coag-negative staph  Left foot wound culture 07/20/2022 Enterobacter cloacae  Left foot bone culture 06/06/2022 staph epidermidis  Left foot wound culture 06/16/2022 MRSA Streptococcus agalactiae anaerobes  Urine culture  04/22/2022 ESBL E coli  Wound culture 02/09/2022 Proteus mirabilis Pseudomonas aeruginosa  Wound culture 02/23/2021 diphtheroids  Left leg wound culture 12/09/2020 Proteus mirabilis anaerobes  Left foot bone culture 12/09/2020 Proteus mirabilis     Past Medical History:  Past Medical History:   Diagnosis Date    Arthritis     legs    Bacteremia due to Gram-negative bacteria 3/23/2021    Diabetes mellitus     Diabetes mellitus, type 2     Hyperlipidemia     Hypertension     Osteomyelitis     Palliative care encounter 5/24/2023       Past Surgical History:  Past Surgical History:   Procedure Laterality Date    ABOVE-KNEE AMPUTATION Left 3/27/2024    Procedure: AMPUTATION, ABOVE KNEE;  Surgeon: Adal Arellano MD;  Location: [facility] OR [address] [place] [address];  Service: Vascular;  Laterality: Left;    ANGIOGRAPHY OF LOWER EXTREMITY N/A 2/3/2021    Procedure: Angiogram Extremity Bilateral;  Surgeon: Ernst Chacko MD;  Location: [facility] OR [address] [place] [address];  Service: Peripheral Vascular;  Laterality: N/A;  7.4 mintues fluoroscopy time  816.15 mGy  170.17 Gycm2    AORTOGRAPHY WITH EXTREMITY RUNOFF Bilateral 2/3/2021    Procedure: AORTOGRAM, WITH EXTREMITY RUNOFF;  Surgeon: Ernst Chacko MD;  Location: [facility] OR [address] [place] [address];  Service: Peripheral Vascular;  Laterality: Bilateral;    DEBRIDEMENT OF FOOT Left 2/23/2021    Procedure: DEBRIDEMENT, LEFT HEEL;  Surgeon: Mayra Schroeder DPM;  Location: [facility] OR [address] [place] [address];  Service: Podiatry;  Laterality: Left;    FOOT AMPUTATION  October 2010    left high midfoot amputation    IMPLANTATION OF LEADLESS PACEMAKER N/A 10/12/2023    Procedure: [id number];  Surgeon: VANESSA Delatorre MD;  Location: [facility] EP LAB;  Service: Cardiology;  Laterality: N/A;  AVB, MICRA, EH, [place], [place] [id number]       Allergies:  Review of patient's allergies indicates:  No Known Allergies    Medications:  Reviewed  Antibiotics  Vancomycin  Cefepime  Metronidazole  Family History:  Family History    Problem Relation Name Age of Onset    Diabetes Mother      Heart disease Mother      Stroke Sister      Cancer Brother      Diabetes Sister         Social History:  Social History     Tobacco Use    Smoking status: Never    Smokeless tobacco: Never   Substance Use Topics    Alcohol use: No     Comment: occassional    Drug use: No     Review of Systems   Genitourinary:         Sams   Musculoskeletal:         Wounds       Objective:   Last 24 Hour Vital Signs:  BP  Min: 117/57  Max: 139/65  Temp  Av.6 °F (37.6 °C)  Min: 98.1 °F (36.7 °C)  Max: 102.3 °F (39.1 °C)  Pulse  Av.9  Min: 61  Max: 110  Resp  Av.9  Min: 16  Max: 27  SpO2  Av.3 %  Min: 85 %  Max: 100 %  Weight  Av.2 kg (223 lb 0.8 oz)  Min: 101.2 kg (223 lb)  Max: 101.2 kg (223 lb 1.7 oz)  I/O last 3 completed shifts:  In: 2614 [IV Piggyback:2614]  Out: -     Physical Exam  Vitals and nursing note reviewed.   Constitutional:       Comments: interactive   HENT:      Head: Normocephalic and atraumatic.      Mouth/Throat:      Mouth: Mucous membranes are moist.      Pharynx: Oropharynx is clear. No oropharyngeal exudate.   Eyes:      Conjunctiva/sclera: Conjunctivae normal.      Pupils: Pupils are equal, round, and reactive to light.   Cardiovascular:      Rate and Rhythm: Normal rate.      Heart sounds: No murmur heard.     No friction rub. No gallop.      Comments: Pacer in place.  Decreased right foot pulses  Pulmonary:      Breath sounds: Normal breath sounds. No rales.   Abdominal:      General: Bowel sounds are normal. There is no distension.      Palpations: Abdomen is soft.      Tenderness: There is no abdominal tenderness. There is no guarding.   Genitourinary:     Comments: Sams in place with cloudy urine.  Sacral wounds per photographs reviewed  Musculoskeletal:      Cervical back: Neck supple.      Right lower leg: Edema present.      Comments: Left AKA.  Right lower extremity with edema and wounds on toes and heel.  Heel  wound wrapped.  Photographs reviewed.  Upper extremities without evidence of infection   Lymphadenopathy:      Cervical: No cervical adenopathy.   Skin:     Findings: Erythema and lesion present.   Neurological:      Comments: Extremity Interactive and answers questions.  Moves upper extremities with some decreased coordination.  Decreased strength right lower extremity         Devices:  Peripheral IV    Laboratory Results: reviewed  Most Recent Data:  CBC:   Lab Results   Component Value Date    WBC 15.97 (H) 05/20/2024    HGB 8.7 (L) 05/20/2024    HCT 29.0 (L) 05/20/2024     05/20/2024    MCV 83 05/20/2024    RDW 19.9 (H) 05/20/2024     BMP:   Lab Results   Component Value Date     05/20/2024    K 3.5 05/20/2024     05/20/2024    CO2 20 (L) 05/20/2024    BUN 16 05/20/2024     (H) 05/20/2024    CALCIUM 8.5 (L) 05/20/2024    MG 1.8 05/20/2024    PHOS 3.7 05/20/2024     LFTs:   Lab Results   Component Value Date    PROT 7.1 05/20/2024    ALBUMIN 1.8 (L) 05/20/2024    BILITOT 0.5 05/20/2024    AST 49 (H) 05/20/2024    ALKPHOS 67 05/20/2024    ALT 46 (H) 05/20/2024     Urinalysis:   Lab Results   Component Value Date    LABURIN PROVIDENCIA STUARTII  >100,000 cfu/ml   (A) 05/08/2024    COLORU Yellow 05/19/2024    CLARITYU Slightly Cloudy 04/22/2022    SPECGRAV 1.020 05/19/2024    NITRITE Negative 05/19/2024    KETONESU Trace (A) 05/19/2024    UROBILINOGEN Negative 05/19/2024       Trended Lab Data:  Recent Labs   Lab 05/19/24 2055 05/19/24 2059 05/19/24  2100 05/20/24  0555   WBC  --  15.13*  --  15.97*   HGB  --  8.3*  --  8.7*   HCT 26.2* 26.2*  --  29.0*   PLT  --  413  --  394   MCV  --  81*  --  83   RDW  --  19.8*  --  19.9*   NA  --   --  138 136   K  --   --  3.3* 3.5   CL  --   --  99 100   CO2  --   --  26 20*   BUN  --   --  16 16   GLU  --   --  200* 305*   PROT  --   --  7.4 7.1   ALBUMIN  --   --  1.9* 1.8*   BILITOT  --   --  0.5 0.5   AST  --   --  50* 49*   ALKPHOS  --   --   71 67   ALT  --   --  48* 46*       Microbiology Data:  Blood cultures 05/19/2024 no growth so far  Urine culture in process  Urine culture 05/08/2024 Providencia stuartii urine  Blood cultures 05/08/2024 no growth    Other Results:    Radiology:  Reviewed  CT chest abdomen pelvis 05/20/2024 with gallstones.  Bladder wall thickening.  Prostatomegaly.  Stable mediastinal adenopathy  Head CT without acute abnormalities  Right heel with ulcer and questionable erosive changes  Chest x-ray with small right effusion  Problem List  Patient Active Problem List   Diagnosis    Tinea pedis    Wound, open, foot with complication    Type 2 diabetes mellitus with diabetic neuropathy, unspecified    Essential hypertension    Insulin dependent type 2 diabetes mellitus    Cataract cortical, senile    Anemia of chronic disease    Asymptomatic Mobitz (type) I (Wenckebach's) atrioventricular block    Peripheral arterial disease    PVD (peripheral vascular disease)    Morbid obesity    Leg swelling    Chronic deep vein thrombosis (DVT) of right upper extremity    Nonhealing ulcer of right lower leg with fat layer exposed    Hypergranulation    Osteomyelitis of right lower extremity    Paroxysmal atrial fibrillation    History of complete heart block    Blurred vision, left eye    Other hyperlipidemia    Stage 3b chronic kidney disease    History of amputation of left high mid foot     Long term current use of insulin    Chronic anticoagulation    BPH (benign prostatic hyperplasia)    Sepsis secondary to UTI     See above for impression and recommendations.

## 2024-05-20 NOTE — ED PROVIDER NOTES
Encounter Date: 5/19/2024       History     Chief Complaint   Patient presents with    Altered Mental Status     Brought in by [facility] from [place]. Reported baseline of A&O x 4, oriented to name only in triage. Pt has indwelling catheter with cloudy output.     HPI     74 y.o. -American male who has a past medical history of DMII, HLD, HTN, Left AKA, PAD, Microcytic Anemia, Pacemaker for 3rd degree heart block.  He has a history of Gram-negative bacteremia.  Presents to the ER for evaluation of sepsis.  Patient was recently admitted and discharged for proximally week ago for sepsis secondary to UTI from chronic indwelling Asms.  He was subsequently discharged with Bactrim unsure if he completed the 10 day course.  Apparently today was altered febrile and tachycardic.  EMS was activated to bring patient to the ER.  He arrived in the ER febrile at 102 ,tachycardic, code sepsis was activated.    Review of patient's allergies indicates:  No Known Allergies  Past Medical History:   Diagnosis Date    Arthritis     legs    Bacteremia due to Gram-negative bacteria 3/23/2021    Diabetes mellitus     Diabetes mellitus, type 2     Hyperlipidemia     Hypertension     Osteomyelitis     Palliative care encounter 5/24/2023     Past Surgical History:   Procedure Laterality Date    ABOVE-KNEE AMPUTATION Left 3/27/2024    Procedure: AMPUTATION, ABOVE KNEE;  Surgeon: Adal Arellano MD;  Location: [facility] OR [address] [place] [address];  Service: Vascular;  Laterality: Left;    ANGIOGRAPHY OF LOWER EXTREMITY N/A 2/3/2021    Procedure: Angiogram Extremity Bilateral;  Surgeon: Ernst Chacko MD;  Location: [facility] OR [address] [place] [address];  Service: Peripheral Vascular;  Laterality: N/A;  7.4 mintues fluoroscopy time  816.15 mGy  170.17 Gycm2    AORTOGRAPHY WITH EXTREMITY RUNOFF Bilateral 2/3/2021    Procedure: AORTOGRAM, WITH EXTREMITY RUNOFF;  Surgeon: Ernst Chacko MD;  Location: [facility] OR [address] [place] [address];  Service: Peripheral Vascular;   Laterality: Bilateral;    DEBRIDEMENT OF FOOT Left 2/23/2021    Procedure: DEBRIDEMENT, LEFT HEEL;  Surgeon: Mayra Schroeder DPM;  Location: [facility] OR [address] [place] [address];  Service: Podiatry;  Laterality: Left;    FOOT AMPUTATION  October 2010    left high midfoot amputation    IMPLANTATION OF LEADLESS PACEMAKER N/A 10/12/2023    Procedure: [id number];  Surgeon: VANESSA Delatorre MD;  Location: [facility] EP LAB;  Service: Cardiology;  Laterality: N/A;  AVB, MICRA, EH, [place], [place] [id number]     Family History   Problem Relation Name Age of Onset    Diabetes Mother      Heart disease Mother      Stroke Sister      Cancer Brother      Diabetes Sister       Social History     Tobacco Use    Smoking status: Never    Smokeless tobacco: Never   Substance Use Topics    Alcohol use: No     Comment: occassional    Drug use: No     Review of Systems   Unable to perform ROS: Mental status change       Physical Exam     Initial Vitals [05/19/24 2028]   BP Pulse Resp Temp SpO2   (!) 126/58 110 (!) 25 (!) 102.3 °F (39.1 °C) 96 %      MAP       --         Physical Exam    Nursing note and vitals reviewed.  Constitutional:   Elderly chronically ill-appearing    HENT:   Head: Normocephalic and atraumatic.   Eyes: Pupils are equal, round, and reactive to light.   Neck:   Normal range of motion.  Cardiovascular:            Tachycardic rate and rhythm   Pulmonary/Chest:   Diminished breath sounds bilaterally no obvious distress   Abdominal: Abdomen is soft. He exhibits no distension. There is no abdominal tenderness.   Musculoskeletal:      Cervical back: Normal range of motion.      Comments: Left below-knee amputation noted     Right lower extremity there is purulent discharge on 1st digit right foot, chronic heel wound     Neurological:   Opens eyes does not track or [place] commands   Skin:   Warm to touch   Psychiatric:   Unable to assess         ED Course   Procedures  Labs Reviewed   CBC W/ AUTO DIFFERENTIAL - Abnormal; Notable for  the following components:       Result Value    WBC 15.13 (*)     RBC 3.24 (*)     Hemoglobin 8.3 (*)     Hematocrit 26.2 (*)     MCV 81 (*)     MCH 25.6 (*)     MCHC 31.7 (*)     RDW 19.8 (*)     MPV 8.8 (*)     Gran # (ANC) 13.4 (*)     Immature Grans (Abs) 0.05 (*)     Gran % 88.3 (*)     Lymph % 8.5 (*)     Mono % 2.6 (*)     All other components within normal limits   COMPREHENSIVE METABOLIC PANEL - Abnormal; Notable for the following components:    Potassium 3.3 (*)     Glucose 200 (*)     Albumin 1.9 (*)     AST 50 (*)     ALT 48 (*)     eGFR 57 (*)     All other components within normal limits   URINALYSIS, REFLEX TO URINE CULTURE - Abnormal; Notable for the following components:    Appearance, UA Cloudy (*)     Protein, UA 3+ (*)     Ketones, UA Trace (*)     Occult Blood UA 3+ (*)     Leukocytes, UA 3+ (*)     All other components within normal limits    Narrative:     Specimen Source->Urine   PHOSPHORUS - Abnormal; Notable for the following components:    Phosphorus 2.5 (*)     All other components within normal limits   APTT - Abnormal; Notable for the following components:    aPTT 39.6 (*)     All other components within normal limits   PROTIME-INR - Abnormal; Notable for the following components:    Prothrombin Time 13.7 (*)     INR 1.3 (*)     All other components within normal limits   B-TYPE NATRIURETIC PEPTIDE - Abnormal; Notable for the following components:     (*)     All other components within normal limits   TROPONIN I - Abnormal; Notable for the following components:    Troponin I 0.060 (*)     All other components within normal limits   PROCALCITONIN - Abnormal; Notable for the following components:    Procalcitonin 0.90 (*)     All other components within normal limits   C-REACTIVE PROTEIN - Abnormal; Notable for the following components:    .3 (*)     All other components within normal limits   URINALYSIS MICROSCOPIC - Abnormal; Notable for the following components:    RBC,  UA >100 (*)     WBC, UA >100 (*)     Bacteria Few (*)     Yeast, UA Many (*)     All other components within normal limits    Narrative:     Specimen Source->Urine   TROPONIN I - Abnormal; Notable for the following components:    Troponin I 0.059 (*)     All other components within normal limits   INFLUENZA A & B BY MOLECULAR   CULTURE, BLOOD   CULTURE, BLOOD   CULTURE, URINE   LACTIC ACID, PLASMA   MAGNESIUM   LIPASE   AMMONIA   LACTIC ACID, PLASMA    Narrative:     Collection has been rescheduled by [facility]1 at 05/19/2024 23:22 Reason:   Patient unavailable   COMPREHENSIVE METABOLIC PANEL   MAGNESIUM   PHOSPHORUS   CBC W/ AUTO DIFFERENTIAL          Imaging Results              CT Chest Abdomen Pelvis With IV Contrast (XPD) NO Oral Contrast (Final result)  Result time 05/20/24 00:08:58      Final result by Zane Galeana DO (05/20/24 00:08:58)                   Impression:      1. Urinary bladder wall thickening, correlate with urinalysis to exclude cystitis.  2. Cholelithiasis without acute cholecystitis.  3. Prostatomegaly.  4. Stable mediastinal lymphadenopathy.      Electronically signed by: Zane Galeana  Date:    05/20/2024  Time:    00:08               Narrative:    EXAMINATION:  CT CHEST ABDOMEN PELVIS WITH IV CONTRAST (XPD)    CLINICAL HISTORY:  Sepsis;    TECHNIQUE:  Low dose axial images were obtained from the thoracic inlet to the pubic symphysis following the administration of 100 mL of Omnipaque 350 intravenous contrast.  Sagittal and coronal reformats were provided.    COMPARISON:  CT of the abdomen and pelvis from 05/09/2024.  CT of the chest from 09/17/2022.    FINDINGS:  Lungs: Clear.    Airways: The large airways are clear.    Pleura: No fluid or thickening.  No pneumothorax.    Lymph nodes: 1.6 cm right pretracheal node (series 2, image 47), decreased in size from prior.    Esophagus: Normal.    Heart: Heart size is normal.  No pericardial effusion.  Retained pacer lead noted in the right  ventricle.      Chest wall: There is bilateral gynecomastia.    Liver: Normal.    Biliary tract: No intra or extrahepatic biliary ductal dilatation.    Gallbladder: There are numerous radiodense gallstones.  There is no gallbladder wall thickening or pericholecystic fluid.    Pancreas: Normal. No pancreatic ductal dilation.    Spleen: Normal in size without focal lesion.    Adrenals: Normal.    Kidneys and urinary collecting systems: There is a simple cyst in the left kidney superior pole.  There is a single punctate nonobstructing right renal calculus.  There is no hydronephrosis or obstructing ureteral stone.    Stomach and bowel: No bowel obstruction or abnormal bowel wall thickening.    Peritoneum and mesentery: No ascites or free intraperitoneal air.  No abdominal fluid collection.  No evidence of mesenteric or retroperitoneal lymphadenopathy.  The appendix is normal.    Vasculature: There is an IVC filter.  There is mild atherosclerosis.  No aneurysm.    Urinary bladder: There is urinary bladder wall thickening.  There is a Sams catheter.  There is expected air in the bladder lumen.    Reproductive organs: The prostate is enlarged.    Body wall: There is body wall anasarca, mild.    Musculoskeletal: There degenerative changes.  No acute fracture or dislocation.  No aggressive osseous lesion.                                       CT Head Without Contrast (Final result)  Result time 05/19/24 23:01:21      Final result by Zane Galeana DO (05/19/24 23:01:21)                   Impression:      No acute intracranial abnormality.    Chronic microvascular ischemic changes and several remote lacunar infarcts as above.      Electronically signed by: Zane Galeana  Date:    05/19/2024  Time:    23:01               Narrative:    EXAMINATION:  CT HEAD WITHOUT CONTRAST    CLINICAL HISTORY:  Mental status change, unknown cause;    TECHNIQUE:  Low dose axial CT images obtained throughout the head without intravenous  contrast. Sagittal and coronal reconstructions were performed.    COMPARISON:  MRI brain from 10/03/2023.    FINDINGS:  Ventricles and sulci are normal in size for age without evidence of hydrocephalus. No extra-axial blood or fluid collections.  There are remote lacunar infarcts in the left kofi, the left cerebellar hemisphere, the left thalamus, and the left basal ganglia.  There are chronic microvascular ischemic changes.  No parenchymal mass, hemorrhage, edema or major vascular distribution infarct.    No calvarial fracture.  The scalp is unremarkable.  Bilateral paranasal sinuses and mastoid air cells are clear.  There are bilateral prosthetic lenses.                                       X-Ray Foot 2 View Right (Final result)  Result time 05/19/24 21:48:55   Procedure changed from X-Ray Foot Complete Right     Final result by Zane Galeana DO (05/19/24 21:48:55)                   Impression:      Soft tissue ulcer of the heel.  Questionable osteomyelitis of the posterior calcaneus as above.  Further evaluation with MRI as clinically indicated.      Electronically signed by: Zane Galeana  Date:    05/19/2024  Time:    21:48               Narrative:    EXAMINATION:  XR FOOT 2 VIEW RIGHT    CLINICAL HISTORY:  sepsis;  Sepsis, unspecified organism    TECHNIQUE:  AP and cross-table lateral radiographs of the right foot.    COMPARISON:  Radiographs from 05/09/2024.    FINDINGS:  There is diffuse osteopenia.  There is a soft tissue ulcer of the posterior aspect of the heel.  The previously seen Achilles calcaneal enthesophyte is no longer seen, which indicates possible interval erosive changes/osteomyelitis.  Alternatively, due to change in positioning, there may be overlapping structures, limiting evaluation of this previously seen enthesophyte.  No definite erosive changes are seen.  There is joint space narrowing of the midfoot and hindfoot.  There are foci of soft tissue gas in the posterior soft tissues.   There are vascular calcifications.  There is a chronic deformity of the 5th toe, stable.                                       X-Ray Chest AP Portable (Final result)  Result time 05/19/24 21:28:40      Final result by Zane Galeana DO (05/19/24 21:28:40)                   Impression:      Small right pleural effusion.  Otherwise no acute cardiopulmonary abnormality or significant detrimental change from prior.      Electronically signed by: Zane Galeana  Date:    05/19/2024  Time:    21:28               Narrative:    EXAMINATION:  XR CHEST AP PORTABLE    CLINICAL HISTORY:  Sepsis;    TECHNIQUE:  Single frontal view of the chest was performed.    COMPARISON:  05/08/2024.    FINDINGS:  The lungs are hypoexpanded.  There is a small right pleural effusion with minimal fluid in the minor fissure.  The lungs are otherwise clear.  No pneumothorax.  The cardiac silhouette is borderline.                                       Medications   sodium chloride 0.9% flush 10 mL (has no administration in time range)   naloxone 0.4 mg/mL injection 0.02 mg (has no administration in time range)   vancomycin - pharmacy to dose (has no administration in time range)   ceFEPIme (MAXIPIME) 2 g in dextrose 5 % in water (D5W) 100 mL IVPB (MB+) (has no administration in time range)   vancomycin (VANCOCIN) 2,250 mg in dextrose 5 % (D5W) 500 mL IVPB (2,250 mg Intravenous Trough Due As Scheduled Before Dose 5/21/24 2000)   metronidazole IVPB 500 mg (0 mg Intravenous Stopped 5/20/24 0318)   potassium phosphate 15 mmol in dextrose 5 % (D5W) 250 mL infusion (15 mmol Intravenous New Bag 5/20/24 0402)   potassium chloride 10 mEq in 100 mL IVPB (has no administration in time range)   magnesium sulfate 2g in water 50mL IVPB (premix) (has no administration in time range)   sodium chloride 0.9% bolus 1,914 mL 1,914 mL (0 mLs Intravenous Stopped 5/19/24 2324)   ceFEPIme (MAXIPIME) 2 g in dextrose 5 % in water (D5W) 100 mL IVPB (MB+) (0 g Intravenous  Stopped 5/19/24 2153)   acetaminophen suppository 650 mg (650 mg Rectal Given 5/19/24 2055)   vancomycin (VANCOCIN) 2,250 mg in dextrose 5 % (D5W) 500 mL IVPB (0 mg Intravenous Stopped 5/20/24 0209)   iohexoL (OMNIPAQUE 350) injection 100 mL (100 mLs Intravenous Given 5/19/24 2349)     Medical Decision Making  This is a 74-year-old male multiple medical problems including recent admission for gram-negative bacteremia presents the ER for evaluation of altered mental status and fever.  Noticed today by nursing home transported to the ER.  He was febrile and tachycardic code sepsis was activated.  He does have cloudy urine in his Sams catheter will plan to replace.  He does have some discharge on his 1st digit right toe which could also be the source of infection.  Will initiate broad-spectrum antibiotics fluids, cultures blood work and plan admission.    Differential includes sepsis from UTI, osteomyelitis, pneumonia, viral syndrome other cause    Amount and/or Complexity of Data Reviewed  Independent Historian: EMS  External Data Reviewed: labs, radiology, ECG and notes.  Labs: ordered. Decision-making details documented in ED Course.  Radiology: ordered and independent interpretation performed. Decision-making details documented in ED Course.  ECG/medicine tests: ordered and independent interpretation performed. Decision-making details documented in ED Course.    Risk  OTC drugs.  Prescription drug management.  Decision regarding hospitalization.               ED Course as of 05/20/24 0515   Mon May 20, 2024   0106 Procalcitonin(!) [SE]   0108 C-reactive protein(!) [SE]   0108 Troponin I(!) [SE]   0108 Urinalysis, Reflex to Urine Culture Urine, Clean Catch(!) [SE]   0108 Comprehensive metabolic panel(!) [SE]   0108 Protime-INR(!) [SE]   0108 POCT Venous Blood Gas(!!) [SE]   0108 CBC auto differential(!) [SE]   0108 CT Head Without Contrast [SE]   0108 X-Ray Foot 2 View Right [SE]   0108 X-Ray Chest AP Portable [SE]  "  0108 CT Chest Abdomen Pelvis With IV Contrast (XPD) NO Oral Contrast  Resting in bed no distress fever improving.  Labs imaging reviewed.  UA concerning for UTI labs concerning with infectious process.  Patient was multiple decubitus ulcers with foul-smelling and soaked dressings.  Believe has not been changed in quite some time.  Patient was discussed with [facility] we discussed HPI presenting illness labs and for admission.  Patient was admitted to their service [SE]      ED Course User Index  [SE] Trinity Hays MD                       Critical Care Time: 55 minutes  Critical care was time spent personally by me on the following activities: evaluating this patient's organ dysfunction, development of treatment plan, discussing treatment plan with patient or surrogate and bedside caregivers, discussions with consultants, evaluation of patient's response to treatment, examination of patient, ordering and performing treatments and interventions, ordering and review of laboratory studies, ordering and review of radiographic studies, re-evaluation of patient's condition. This critical care time did not overlap with that of any other provider or involve time for any procedures.    This patient does have evidence of infective focus  My overall impression is sepsis.  Source: Urinary Tract  Antibiotics given-   Antibiotics (72h ago, onward)      Start     Stop Route Frequency Ordered    05/20/24 2100  vancomycin (VANCOCIN) 2,250 mg in dextrose 5 % (D5W) 500 mL IVPB         -- IV Every 24 hours (non-standard times) 05/20/24 0001    05/20/24 0900  ceFEPIme (MAXIPIME) 2 g in dextrose 5 % in water (D5W) 100 mL IVPB (MB+)         -- IV Every 12 hours (non-standard times) 05/19/24 2313    05/20/24 0130  metronidazole IVPB 500 mg         -- IV Every 8 hours (non-standard times) 05/20/24 0111    05/20/24 0012  vancomycin - pharmacy to dose  (vancomycin IVPB (PEDS and ADULTS))        Placed in "And" Linked Group "    -- IV pharmacy to manage frequency 05/19/24 2313          Latest lactate reviewed-  Recent Labs   Lab 05/19/24 2055 05/19/24  2100 05/20/24  0007   LACTATE  --    < > 1.0   POCLAC 1.0  --   --     < > = values in this interval not displayed.     Organ dysfunction indicated by Encephalopathy    Fluid challenge Actual Body weight- Patient will receive 30ml/kg actual body weight to calculate fluid bolus for treatment of septic shock.     Post- resuscitation assessment Yes Perfusion exam was performed within 6 hours of septic shock presentation after bolus shows Adequate tissue perfusion assessed by non-invasive monitoring       Will Not start Pressors- Levophed for MAP of 65  Source control achieved by: antibiotics      Clinical Impression:  Final diagnoses:  [A41.9] Sepsis  [A41.9] Sepsis - 1st digit and heel wound, possible osteo?  [R41.82] Altered mental status, unspecified altered mental status type (Primary)  [T83.511A, N39.0] Urinary tract infection associated with indwelling urethral catheter, initial encounter  [L89.45] Pressure injury of contiguous region involving back and buttock, unstageable, unspecified laterality          ED Disposition Condition    Admit Stable                Trinity Hays MD  05/20/24 0514       Trinity Hays MD  05/20/24 0500

## 2024-05-20 NOTE — CONSULTS
Palliative Care Consult Note:    Consult received.  Medical record reviewed,discussed with referring provider, Dr. Valdes . Physician's goal for PM consult is goals of care.     Full consult with Palliative Medicine provider to follow after bedside family meeting with pt's sister, Kandy, tomorrow at 1200.    Thank you for allowing the Palliative Medicine team  to be apart of this patient's care.    Rosalio Mercado MD  Director of Hospice and Palliative Medicine  [facility]

## 2024-05-20 NOTE — PROGRESS NOTES
[facility] Medicine Progress Note    Primary Team: [facility] Hospitalist Team B  Attending Physician: Kumar Valdes MD  Resident: Melony  Intern: Iwona    Subjective:      NAEO. AFVSS. Patient is reporting pain this morning in his sacrum and heel. He denies fever / chills, n/v/d, chest pain or SOB.      Objective:     Last 24 Hour Vital Signs:  BP  Min: 117/57  Max: 139/65  Temp  Av.5 °F (38.1 °C)  Min: 98.7 °F (37.1 °C)  Max: 102.3 °F (39.1 °C)  Pulse  Av.8  Min: 70  Max: 110  Resp  Av.4  Min: 17  Max: 27  SpO2  Av.3 %  Min: 85 %  Max: 100 %  Weight  Av.2 kg (223 lb)  Min: 101.2 kg (223 lb)  Max: 101.2 kg (223 lb)  I/O last 3 completed shifts:  In: 2614 [IV Piggyback:2614]  Out: -     Physical Examination:   General: A&Ox3, NAD  Head: Normocephalic, atraumatic, without obvious abnormality.  Eyes: Conjunctivae/corneas clear, anicteric sclera.  Mouth: OP without erythema nor exudates, MMM.  Poor dentition.  Lungs: Non-labored breathing, Symmetric chest rise, CTAB.  Heart: RRR, No murmurs, rub, nor click.  Abdomen: Soft, NT, ND, no obvious masses and/or organomegaly.  No peritoneal signs.  : No suprapubic tenderness nor distention. Sams in place.  Extremities: Left AKA with incision site without signs of infection. Right heel wrapped in new guaze.   Vascular: No obvious cyanosis, pallor, nor edema.   Skin: Multiple unstageable ulcers  Neurologic: No gross focal neurological deficits nor gross muscle weakness.  Psychiatric: Appropriate affect.    Images from :             Laboratory:  Laboratory Data Reviewed: yes  Pertinent Findings:  CMP:   AGMA, Glu: 305, Cr: 1.2, Alb: 1.8, AST/ALT: 49/46, bili / alk phos wnl  CBC:   WBC: 15.97, H/H: 8.7/29, Plts: 394 MCV: 83    Phos: 3.7  M.8  Trop: 0.06 -> 0.059  BNP: 313  LA: 1.0  Crp: 195  UA: Cloudy, +3 protein, +3 leukocytes, negative nitrites   Ammonia: 26  Procal: 0.90  Lipase: 17      Microbiology Data Reviewed: yes  Pertinent  Findings:  Bcx's 5/19: 2/2 pending  Ucx 5/19: pending  Influenza A&B: Negative    Other Results:  EKG (my interpretation): Mobitz Type II heart block, no changes from previous ekg    Radiology Data Reviewed: yes  Pertinent Findings:  MRI Foot pending    Current Medications:     Infusions:       Scheduled:   ceFEPime IV (PEDS and ADULTS)  2 g Intravenous Q12H    magnesium sulfate IVPB  2 g Intravenous Once    metronidazole  500 mg Intravenous Q8H    potassium chloride  10 mEq Intravenous Q1H    potassium phosphate IVPB  15 mmol Intravenous Once    vancomycin (VANCOCIN) IV (PEDS and ADULTS)  2,250 mg Intravenous Q24H        PRN:    Current Facility-Administered Medications:     HYDROcodone-acetaminophen, 1 tablet, Oral, Q6H PRN    naloxone, 0.02 mg, Intravenous, PRN    sodium chloride 0.9%, 10 mL, Intravenous, Q12H PRN    Pharmacy to dose Vancomycin consult, , , Once **AND** vancomycin - pharmacy to dose, , Intravenous, pharmacy to manage frequency    Antibiotics and Day Number of Therapy:  Vancomycin 5/19  Cefepime 5/19  Flagyl 5/19    Lines and Day Number of Therapy:  None    Assessment:     Saji Castañeda is a 74 y.o. male with medical comorbidities of T2DM on long-term insulin, PVD with diabetic ulcer s/p left AKA (3/27/2024), Chronic Multifactorial Anemia, BPH s/p Chronic Fung, paroxysmal A-fib on eliquis, CHB s/p PPM (10/12/2023), CKD3, Multifactorial HTN, and HLD who presented to [facility] on 5/19/2024 with a primary complaint of Acute Encephalopathy for 1 day.  Initial evaluation pertinent for multiple pressure ulcers, now on broad coverage Abx.        Plan:     Sepsis likely 2/2 infected pressure wounds vs UTI  Unstageable Sacral Pressure Ulcer x2  # Suspected source(s) of infection  Unstageable Right Heel Pressure Ulcer  Right Hallux Infection  Chronic indwelling fung  On admission, patient febrile with malodorous pressure ulcers on physical exam and leukocytosis on labs.  Fung changed  in ED, noted to of had potential purulence (documented as cloudy UOP).  - continue Vanc, cefepime, and flagyl; will need to tailor to Cx  - Will place consult to ID today  - Bcxs and Ucx pending  - WC consulted, appreciate assistance.  - MRI foot pending     YNES on CKD3  On admission Cr/eGFR 1.3/57, baseline 0.9-1.0/>60.  S/p 2L NS on admission.  - CTM    AGMA  Co2: 20, A; LA: 1.0, mild ketones in urine   - S/p 2 L in ED  - CTM     Elevated Troponin - resolved  # Suspected Type-II MI  - trended to peak; con discontinue trending at this time     T2DM on long-term insulin  Last A1c >14 2 months prior to admission.  Home Medications: Levemir 9 u BID; Novalog TID  - Start Lantus 10 u daily w SSI     PVD with diabetic ulcer s/p left AKA (3/27/2024)  Likely has ASCVD on Right side also, will make adequate perfusion for healing difficult.  - CTM     BPH s/p Chronic Fung  Fung changed in ED, noted to of had potential purulence (documented as cloudy UOP).  - see above in regards to potential UTI.  - continue fung care.  - Continue home flomax 0.4 mg daily     Chronic Multifactorial Anemia  On admission, Hgb stable at baseline; 8.3 and 7-8 respectively.  - CTM     Mild Hypokalemia  - replete prn     Paroxysmal A-fib on eliquis  Currently paced at the AV Node  - continue home eliquis  - CTM     CHB s/p PPM (10/12/2023)  - CTM     Multifactorial HTN  Home medications: nifedipine 90 mg, Bidil 10 mg TID  - can resume home anti-hypertensive medications as needed     HLD  - continue home statin.     Diet: Diabetic/Cardiac  Telemetry: no  VTE Ppx: Home Eliquis  Code Status: FULL     Dispo: Palliative consulted for GOC conversation; requires further IV abx  Estimated LOS: 2-5 days    Belkys Bustillo MD  [facility] [id number]    [facility] Hospitalist Pager numbers:   [facility] Hospitalist Medicine Team A (Lorne/Riki): [phone]  [facility] Hospitalist Medicine Team B (Arik/Lucio):  [phone]

## 2024-05-20 NOTE — PROGRESS NOTES
"Pharmacokinetic Initial Assessment: IV Vancomycin    Assessment/Plan:    Initiate intravenous vancomycin with loading dose of 2250 mg once followed by a maintenance dose of vancomycin 2250mg IV every 24 hours  Desired empiric serum trough concentration is 15 to 20 mcg/mL  Draw vancomycin trough level 60 min prior to third dose on 5/21 at approximately 2000  Pharmacy will continue to follow and monitor vancomycin.      Please contact pharmacy at extension [phone] with any questions regarding this assessment.     Thank you for the consult,   Dennise Hernándezy       Patient brief summary:  Saji Castañeda is a 74 y.o. male initiated on antimicrobial therapy with IV Vancomycin for treatment of suspected sepsis    Drug Allergies:   Review of patient's allergies indicates:  No Known Allergies    Actual Body Weight:   101.2 kg    Renal Function:   Estimated Creatinine Clearance: 54.7 mL/min (based on SCr of 1.3 mg/dL).,     Dialysis Method (if applicable):  N/A    CBC (last 72 hours):  Recent Labs   Lab Result Units 05/19/24  2059   WBC K/uL 15.13*   Hemoglobin g/dL 8.3*   Hematocrit % 26.2*   Platelets K/uL 413   Gran % % 88.3*   Lymph % % 8.5*   Mono % % 2.6*   Eosinophil % % 0.0   Basophil % % 0.3   Differential Method  Automated       Metabolic Panel (last 72 hours):  Recent Labs   Lab Result Units 05/19/24  2100 05/19/24  2102   Sodium mmol/L 138  --    Potassium mmol/L 3.3*  --    Chloride mmol/L 99  --    CO2 mmol/L 26  --    Glucose mg/dL 200*  --    Glucose, UA   --  Negative   BUN mg/dL 16  --    Creatinine mg/dL 1.3  --    Albumin g/dL 1.9*  --    Total Bilirubin mg/dL 0.5  --    Alkaline Phosphatase U/L 71  --    AST U/L 50*  --    ALT U/L 48*  --    Magnesium mg/dL 1.8  --    Phosphorus mg/dL 2.5*  --        Drug levels (last 3 results):  No results for input(s): "VANCOMYCINRA", "VANCORANDOM", "VANCOMYCINPE", "VANCOPEAK", "VANCOMYCINTR", "VANCOTROUGH" in the last 72 hours.    Microbiologic Results:  Microbiology " Results (last 7 days)       Procedure Component Value Units Date/Time    Blood culture x two cultures. Draw prior to antibiotics. [[id number]] Collected: 05/19/24 2100    Order Status: Sent Specimen: Blood Updated: 05/19/24 2354    Blood culture x two cultures. Draw prior to antibiotics. [[id number]] Collected: 05/19/24 2059    Order Status: Sent Specimen: Blood from Peripheral, Antecubital, Left Updated: 05/19/24 2354    Influenza A & B by Molecular [[id number]] Collected: 05/19/24 2102    Order Status: Completed Specimen: Nasopharyngeal Swab Updated: 05/19/24 2307     Influenza A, Molecular Negative     Influenza B, Molecular Negative     Flu A & B Source Nasal swab    Urine culture [[id number]] Collected: 05/19/24 2102    Order Status: No result Specimen: Urine Updated: 05/19/24 2243

## 2024-05-21 PROBLEM — Z51.5 PALLIATIVE CARE ENCOUNTER: Status: ACTIVE | Noted: 2024-05-21

## 2024-05-21 LAB
ACANTHOCYTES BLD QL SMEAR: PRESENT
ALBUMIN SERPL BCP-MCNC: 1.6 G/DL (ref 3.5–5.2)
ALP SERPL-CCNC: 61 U/L (ref 55–135)
ALT SERPL W/O P-5'-P-CCNC: 60 U/L (ref 10–44)
ANION GAP SERPL CALC-SCNC: 11 MMOL/L (ref 8–16)
ANISOCYTOSIS BLD QL SMEAR: SLIGHT
AST SERPL-CCNC: 89 U/L (ref 10–40)
BASOPHILS NFR BLD: 0 % (ref 0–1.9)
BILIRUB SERPL-MCNC: 0.3 MG/DL (ref 0.1–1)
BUN SERPL-MCNC: 16 MG/DL (ref 8–23)
BURR CELLS BLD QL SMEAR: ABNORMAL
CALCIUM SERPL-MCNC: 8.3 MG/DL (ref 8.7–10.5)
CHLORIDE SERPL-SCNC: 103 MMOL/L (ref 95–110)
CO2 SERPL-SCNC: 21 MMOL/L (ref 23–29)
CREAT SERPL-MCNC: 0.9 MG/DL (ref 0.5–1.4)
DIFFERENTIAL METHOD BLD: ABNORMAL
EOSINOPHIL NFR BLD: 4 % (ref 0–8)
ERYTHROCYTE [DISTWIDTH] IN BLOOD BY AUTOMATED COUNT: 19.9 % (ref 11.5–14.5)
EST. GFR  (NO RACE VARIABLE): >60 ML/MIN/1.73 M^2
GLUCOSE SERPL-MCNC: 136 MG/DL (ref 70–110)
HCT VFR BLD AUTO: 27.7 % (ref 40–54)
HGB BLD-MCNC: 8.2 G/DL (ref 14–18)
HYPOCHROMIA BLD QL SMEAR: ABNORMAL
IMM GRANULOCYTES # BLD AUTO: ABNORMAL K/UL (ref 0–0.04)
IMM GRANULOCYTES NFR BLD AUTO: ABNORMAL % (ref 0–0.5)
LYMPHOCYTES NFR BLD: 5 % (ref 18–48)
MAGNESIUM SERPL-MCNC: 2.1 MG/DL (ref 1.6–2.6)
MCH RBC QN AUTO: 25.2 PG (ref 27–31)
MCHC RBC AUTO-ENTMCNC: 29.6 G/DL (ref 32–36)
MCV RBC AUTO: 85 FL (ref 82–98)
MONOCYTES NFR BLD: 0 % (ref 4–15)
MRSA ID BY PCR: NEGATIVE
NEUTROPHILS NFR BLD: 91 % (ref 38–73)
NRBC BLD-RTO: 0 /100 WBC
OVALOCYTES BLD QL SMEAR: ABNORMAL
PHOSPHATE SERPL-MCNC: 2.5 MG/DL (ref 2.7–4.5)
PLATELET # BLD AUTO: 307 K/UL (ref 150–450)
PLATELET BLD QL SMEAR: ABNORMAL
PMV BLD AUTO: 10.5 FL (ref 9.2–12.9)
POCT GLUCOSE: 139 MG/DL (ref 70–110)
POCT GLUCOSE: 167 MG/DL (ref 70–110)
POCT GLUCOSE: 174 MG/DL (ref 70–110)
POCT GLUCOSE: 188 MG/DL (ref 70–110)
POIKILOCYTOSIS BLD QL SMEAR: ABNORMAL
POTASSIUM SERPL-SCNC: 4.1 MMOL/L (ref 3.5–5.1)
PROT SERPL-MCNC: 6.4 G/DL (ref 6–8.4)
RBC # BLD AUTO: 3.26 M/UL (ref 4.6–6.2)
SODIUM SERPL-SCNC: 135 MMOL/L (ref 136–145)
STAPH AUREUS ID BY PCR: NEGATIVE
VANCOMYCIN TROUGH SERPL-MCNC: 20.2 UG/ML (ref 10–22)
WBC # BLD AUTO: 13.2 K/UL (ref 3.9–12.7)

## 2024-05-21 PROCEDURE — 63600175 PHARM REV CODE 636 W HCPCS: Performed by: INTERNAL MEDICINE

## 2024-05-21 PROCEDURE — 85027 COMPLETE CBC AUTOMATED: CPT | Performed by: STUDENT IN AN ORGANIZED HEALTH CARE EDUCATION/TRAINING PROGRAM

## 2024-05-21 PROCEDURE — 63600175 PHARM REV CODE 636 W HCPCS: Performed by: STUDENT IN AN ORGANIZED HEALTH CARE EDUCATION/TRAINING PROGRAM

## 2024-05-21 PROCEDURE — 99223 1ST HOSP IP/OBS HIGH 75: CPT | Mod: ,,,

## 2024-05-21 PROCEDURE — 99498 ADVNCD CARE PLAN ADDL 30 MIN: CPT | Mod: ,,,

## 2024-05-21 PROCEDURE — 85007 BL SMEAR W/DIFF WBC COUNT: CPT | Performed by: STUDENT IN AN ORGANIZED HEALTH CARE EDUCATION/TRAINING PROGRAM

## 2024-05-21 PROCEDURE — 84100 ASSAY OF PHOSPHORUS: CPT | Performed by: STUDENT IN AN ORGANIZED HEALTH CARE EDUCATION/TRAINING PROGRAM

## 2024-05-21 PROCEDURE — 99497 ADVNCD CARE PLAN 30 MIN: CPT | Mod: 25,,,

## 2024-05-21 PROCEDURE — 11000001 HC ACUTE MED/SURG PRIVATE ROOM

## 2024-05-21 PROCEDURE — 25000003 PHARM REV CODE 250: Performed by: STUDENT IN AN ORGANIZED HEALTH CARE EDUCATION/TRAINING PROGRAM

## 2024-05-21 PROCEDURE — 25000003 PHARM REV CODE 250

## 2024-05-21 PROCEDURE — 63600175 PHARM REV CODE 636 W HCPCS: Mod: JZ,JG | Performed by: EMERGENCY MEDICINE

## 2024-05-21 PROCEDURE — 80202 ASSAY OF VANCOMYCIN: CPT | Performed by: INTERNAL MEDICINE

## 2024-05-21 PROCEDURE — 36415 COLL VENOUS BLD VENIPUNCTURE: CPT | Performed by: INTERNAL MEDICINE

## 2024-05-21 PROCEDURE — 25000003 PHARM REV CODE 250: Performed by: INTERNAL MEDICINE

## 2024-05-21 PROCEDURE — 80053 COMPREHEN METABOLIC PANEL: CPT | Performed by: STUDENT IN AN ORGANIZED HEALTH CARE EDUCATION/TRAINING PROGRAM

## 2024-05-21 PROCEDURE — 83735 ASSAY OF MAGNESIUM: CPT | Performed by: STUDENT IN AN ORGANIZED HEALTH CARE EDUCATION/TRAINING PROGRAM

## 2024-05-21 PROCEDURE — 36415 COLL VENOUS BLD VENIPUNCTURE: CPT | Performed by: STUDENT IN AN ORGANIZED HEALTH CARE EDUCATION/TRAINING PROGRAM

## 2024-05-21 RX ORDER — OXYCODONE HYDROCHLORIDE 5 MG/1
5 TABLET ORAL EVERY 6 HOURS PRN
Status: DISCONTINUED | OUTPATIENT
Start: 2024-05-21 | End: 2024-05-24 | Stop reason: HOSPADM

## 2024-05-21 RX ORDER — TAMSULOSIN HYDROCHLORIDE 0.4 MG/1
0.4 CAPSULE ORAL DAILY
Status: DISCONTINUED | OUTPATIENT
Start: 2024-05-21 | End: 2024-05-24 | Stop reason: HOSPADM

## 2024-05-21 RX ORDER — MUPIROCIN 20 MG/G
OINTMENT TOPICAL 2 TIMES DAILY
Status: DISCONTINUED | OUTPATIENT
Start: 2024-05-21 | End: 2024-05-24 | Stop reason: HOSPADM

## 2024-05-21 RX ORDER — POLYETHYLENE GLYCOL 3350 17 G/17G
17 POWDER, FOR SOLUTION ORAL DAILY
Status: DISCONTINUED | OUTPATIENT
Start: 2024-05-21 | End: 2024-05-24 | Stop reason: HOSPADM

## 2024-05-21 RX ADMIN — CEFEPIME 2 G: 2 INJECTION, POWDER, FOR SOLUTION INTRAVENOUS at 09:05

## 2024-05-21 RX ADMIN — POLYETHYLENE GLYCOL 3350 17 G: 17 POWDER, FOR SOLUTION ORAL at 11:05

## 2024-05-21 RX ADMIN — APIXABAN 5 MG: 5 TABLET, FILM COATED ORAL at 09:05

## 2024-05-21 RX ADMIN — TAMSULOSIN HYDROCHLORIDE 0.4 MG: 0.4 CAPSULE ORAL at 09:05

## 2024-05-21 RX ADMIN — INSULIN DETEMIR 10 UNITS: 100 INJECTION, SOLUTION SUBCUTANEOUS at 09:05

## 2024-05-21 RX ADMIN — MUPIROCIN: 20 OINTMENT TOPICAL at 09:05

## 2024-05-21 RX ADMIN — METRONIDAZOLE 500 MG: 500 INJECTION, SOLUTION INTRAVENOUS at 01:05

## 2024-05-21 RX ADMIN — METRONIDAZOLE 500 MG: 500 INJECTION, SOLUTION INTRAVENOUS at 06:05

## 2024-05-21 RX ADMIN — MUPIROCIN: 20 OINTMENT TOPICAL at 11:05

## 2024-05-21 RX ADMIN — VANCOMYCIN HYDROCHLORIDE 2250 MG: 500 INJECTION, POWDER, LYOPHILIZED, FOR SOLUTION INTRAVENOUS at 11:05

## 2024-05-21 RX ADMIN — METRONIDAZOLE 500 MG: 500 INJECTION, SOLUTION INTRAVENOUS at 09:05

## 2024-05-21 NOTE — PROGRESS NOTES
[facility] ID note    Pt afebrile. WBC 13.2. On cefepime metronidazole    BCX one set with CONS. Second set negative  UCX in process still    Cont cefepime and metronidazole for now  Adjust based on UCX data  Wound care as you are doing  Goals of care    Please call if any questions   Sameer Gayle  [facility] ID  [phone]

## 2024-05-21 NOTE — CONSULTS
[place] - Telemetry  Palliative Medicine  Consult Note    Patient Name: Saji Castañeda  MRN: [id number]  Admission Date: 5/19/2024  Hospital Length of Stay: 2 days  Code Status: Full Code   Attending Provider: Kumar Valdes MD  Consulting Provider: Marisol Pizaon NP  Primary Care Physician: [facility] -  Principal Problem:Sepsis secondary to UTI    Patient information was obtained from patient, relative(s), past medical records, and primary team.      Consults  Assessment/Plan:     Palliative Care  Palliative care encounter  Mr. Saji Castañeda is a 74 y/oM with medical comorbidities of T2DM on long-term insulin, PVD with diabetic ulcer s/p left AKA (3/27/2024), Chronic Multifactorial Anemia, BPH s/p Chronic Sams, paroxysmal A-fib on eliquis, CHB s/p PPM (10/12/2023), CKD3, Multifactorial HTN, and HLD who presented to [facility] on 5/19/2024 with a primary complaint of Acute Encephalopathy.  Pt admitted septic from presumed UTI, pt also with multiple skin wounds.     -At time of encounter, pt AAOx4, in good spirits.  Does not recall events leading up to admission but aware that confusion was due to suspected infection.  Pt is a resident of [facility] and will return there upon discharge.   -MPOA documentation completed at the bedside. Pt names his sister Kandy Castañeda as MPOA #1 and sister January Nicholson as MPOA #2.  MPOA loaded into EMR and copy given to pts sister at the bedside.   -LaPOST from NH found in pts chart, scanned and uploaded into EMR.  LaPOST with CPR, full treatment, antibiotic use, and a trial period of artificial nutrition by tube selected.   -Met with pt and family at the bedside for and extensive GOC discussion moving forward. Pts sister Kandy Castañeda, pts nephew and 2 family friends were present for conversation.   -We discussed multiple admissions for osteo/sepsis over the past 8 months, reviewed photographs of wounds with family present at the bedside.   "After visualizing wounds, family now grasps magnitude of wounds and accepts that wound care and antibiotics have been adequately used.  They understand that realistically wounds will not be "cured" even with continued antibiotics and wound care alone and that pt is not a candidate for ostomy/ flap procedure. While pt and family verbalize understanding, they express that they do not accept any limitations on interventions and wish for all life preserving measures. Family relies heavily on their geovanny to "take care of" the pt and family.   -Topic of hospice was introduced but this option does not align with family goals.   -Pt remains a full code and requests aggressive care.        Thank you for your consult.     Subjective:     HPI:   Per chart, "Saji Castañeda is a 74 y.o. male with medical comorbidities of T2DM on long-term insulin, PVD with diabetic ulcer s/p left AKA (3/27/2024), Chronic Multifactorial Anemia, BPH s/p Chronic Fung, paroxysmal A-fib on eliquis, CHB s/p PPM (10/12/2023), CKD3, Multifactorial HTN, and HLD who presented to [facility] on 5/19/2024 with a primary complaint of Acute Encephalopathy for 1 day.     The patient was in their usual state of health until this morning when the nursing staff at [facility] noted the patient to be acutely encephalopathic associated with fever and sacral pain.  Patient unable to provide history overall due to current mental status.     Reported during ED Course, unstagable sacral ulcers and chronic fung with cloudy UOP."      Interval History: No acute events reported overnight.  Pt followed by [facility] still in progress. Continued wound care.     Past Medical History:   Diagnosis Date    Arthritis     legs    Bacteremia due to Gram-negative bacteria 3/23/2021    Diabetes mellitus     Diabetes mellitus, type 2     Hyperlipidemia     Hypertension     Osteomyelitis     Palliative care encounter 5/24/2023       Past Surgical History:   Procedure " Laterality Date    ABOVE-KNEE AMPUTATION Left 3/27/2024    Procedure: AMPUTATION, ABOVE KNEE;  Surgeon: Adal Arellano MD;  [facility];  Service: Vascular;  Laterality: Left;    ANGIOGRAPHY OF LOWER EXTREMITY N/A 2/3/2021    Procedure: Angiogram Extremity Bilateral;  Surgeon: Ernst Chacko MD;  [facility];  Service: Peripheral Vascular;  Laterality: N/A;  7.4 mintues fluoroscopy time  816.15 mGy  170.17 Gycm2    AORTOGRAPHY WITH EXTREMITY RUNOFF Bilateral 2/3/2021    Procedure: AORTOGRAM, WITH EXTREMITY RUNOFF;  Surgeon: Ernst Chacko MD;  [facility];  Service: Peripheral Vascular;  Laterality: Bilateral;    DEBRIDEMENT OF FOOT Left 2/23/2021    Procedure: DEBRIDEMENT, LEFT HEEL;  Surgeon: Mayra Schroeder DPM;  Location: [facility] OR 1ST FLR;  Service: Podiatry;  Laterality: Left;    FOOT AMPUTATION  October 2010    left high midfoot amputation    IMPLANTATION OF LEADLESS PACEMAKER N/A 10/12/2023    Procedure: [id number];  Surgeon: VANESSA Delatorre MD;  Location: [facility] EP LAB;  Service: Cardiology;  Laterality: N/A;  AVB, MICRA, EH, ANES, RM [id number]       Review of patient's allergies indicates:  No Known Allergies    Medications:  Continuous Infusions:  Scheduled Meds:   apixaban  5 mg Oral BID    ceFEPime IV (PEDS and ADULTS)  2 g Intravenous Q12H    insulin detemir U-100  10 Units Subcutaneous Daily    metronidazole  500 mg Intravenous Q8H    mupirocin   Nasal BID    polyethylene glycol  17 g Oral Daily    tamsulosin  0.4 mg Oral Daily    vancomycin (VANCOCIN) IV (PEDS and ADULTS)  2,250 mg Intravenous Q24H     PRN Meds:  Current Facility-Administered Medications:     dextrose 10%, 12.5 g, Intravenous, PRN    dextrose 10%, 25 g, Intravenous, PRN    glucagon (human recombinant), 1 mg, Intramuscular, PRN    glucose, 16 g, Oral, PRN    glucose, 24 g, Oral, PRN    insulin aspart U-100, 0-5 Units, Subcutaneous, QID (AC + HS)  PRN    naloxone, 0.02 mg, Intravenous, PRN    oxyCODONE, 5 mg, Oral, Q6H PRN    sodium chloride 0.9%, 10 mL, Intravenous, Q12H PRN    Pharmacy to dose Vancomycin consult, , , Once **AND** vancomycin - pharmacy to dose, , Intravenous, pharmacy to manage frequency    Family History       Problem Relation (Age of Onset)    Cancer Brother    Diabetes Mother, Sister    Heart disease Mother    Stroke Sister          Tobacco Use    Smoking status: Never    Smokeless tobacco: Never   Substance and Sexual Activity    Alcohol use: No     Comment: occassional    Drug use: No    Sexual activity: Not Currently       Review of Systems   Constitutional:  Positive for fatigue.   Respiratory: Negative.     Musculoskeletal: Negative.    Skin:  Positive for wound.   Neurological:  Positive for weakness.     Objective:     Vital Signs (Most Recent):  Temp: 98.8 °F (37.1 °C) (05/21/24 1215)  Pulse: 69 (05/21/24 1215)  Resp: 18 (05/21/24 1215)  BP: 131/84 (05/21/24 1215)  SpO2: 98 % (05/21/24 1215) Vital Signs (24h Range):  Temp:  [97.7 °F (36.5 °C)-98.8 °F (37.1 °C)] 98.8 °F (37.1 °C)  Pulse:  [51-69] 69  Resp:  [16-18] 18  SpO2:  [95 %-99 %] 98 %  BP: (117-143)/(58-84) 131/84     Weight: 101.2 kg (223 lb 1.7 oz)  Body mass index is 36.01 kg/m².       Physical Exam  Vitals and nursing note reviewed.   Constitutional:       General: He is not in acute distress.     Appearance: He is ill-appearing (chronically).   HENT:      Head: Normocephalic.      Nose: Nose normal.      Mouth/Throat:      Mouth: Mucous membranes are moist.   Cardiovascular:      Rate and Rhythm: Normal rate.   Pulmonary:      Effort: Pulmonary effort is normal.   Genitourinary:     Comments: Sams in place   Musculoskeletal:      Comments: L AKA well healed, right heal wrapped      Left Lower Extremity: Left leg is amputated above knee.   Skin:     General: Skin is warm and dry.      Coloration: Skin is ashen.      Findings: Lesion present.   Neurological:       General: No focal deficit present.      Mental Status: He is alert and oriented to person, place, and time.   Psychiatric:         Attention and Perception: Attention normal.         Mood and Affect: Mood normal.         Behavior: Behavior is cooperative.                  Review of Symptoms      Symptom Assessment (ESAS 0-10 Scale)  Pain:  0  Dyspnea:  0  Anxiety:  0  Nausea:  0  Depression:  3  Anorexia:  0  Fatigue:  5  Insomnia:  0  Restlessness:  0  Agitation:  0         Performance Status:  40    Living Arrangements:  Lives in nursing home    Psychosocial/Cultural:   See Palliative Psychosocial Note: No  Social Issues Identified: Coping deficit pt/family and New Diagnosis/Trauma  Bereavement Risk: No  Caregiver Needs Discussed. Caregiver Distress: No: Intensity of family caregiving and Caregiver Burnout Risk  Cultural: none identified at this time   **Primary  to Follow**  Palliative Care  Consult: No    Spiritual:  F - Kasey and Belief:  Restoration   I - Importance:  High      Time-Based Charting:  Yes  Chart Review: 21 minutes  Face to Face: 27 minutes  Symptom Assessment: 10 minutes  Coordination of Care: 15 minutes  Discharge Plannin minutes  Advance Care Plannin minutes  Goals of Care: 25 minutes    Total Time Spent: 131 minutes        Advance Care Planning  Advance Directives:   Living Will: No    LaPOST: Yes    Do Not Resuscitate Status: No    Medical Power of : Yes    Agent's Name:  Kandy Castañeda    Decision Making:  Patient answered questions and Family answered questions  Goals of Care: What is most important right now is to focus on avoiding the hospital, remaining as independent as possible. Accordingly, we have decided that the best plan to meet the patient's goals includes continuing with treatment.         Significant Labs: All pertinent labs within the past 24 hours have been reviewed.  CBC:   Recent Labs   Lab 24  0550   WBC 13.20*   HGB 8.2*   HCT  27.7*   MCV 85        BMP:  Recent Labs   Lab 05/21/24  0550   *   *   K 4.1      CO2 21*   BUN 16   CREATININE 0.9   CALCIUM 8.3*   MG 2.1     LFT:  Lab Results   Component Value Date    AST 89 (H) 05/21/2024    ALKPHOS 61 05/21/2024    BILITOT 0.3 05/21/2024     Albumin:   Albumin   Date Value Ref Range Status   05/21/2024 1.6 (L) 3.5 - 5.2 g/dL Final     Protein:   Total Protein   Date Value Ref Range Status   05/21/2024 6.4 6.0 - 8.4 g/dL Final     Lactic acid:   Lab Results   Component Value Date    LACTATE 1.0 05/20/2024    LACTATE 1.0 05/19/2024       Significant Imaging: I have reviewed all pertinent imaging results/findings within the past 24 hours.      Marisol Pizano NP  Palliative Medicine  [place] [facility]

## 2024-05-21 NOTE — PROGRESS NOTES
"[facility] Medicine Progress Note    Primary Team: [facility] Hospitalist Team B  Attending Physician: Kumar Valdes MD  Resident: Melony  Intern: Iwona    Subjective:      NAEO. AFVSS. Palliative to have family meeting today with patient and sister. Denies any complaints this morning     Objective:     Last 24 Hour Vital Signs:  BP  Min: 117/58  Max: 143/65  Temp  Av.2 °F (36.8 °C)  Min: 97.7 °F (36.5 °C)  Max: 98.7 °F (37.1 °C)  Pulse  Av.7  Min: 51  Max: 66  Resp  Av.1  Min: 16  Max: 18  SpO2  Av.9 %  Min: 95 %  Max: 100 %  Height  Av' 6" (167.6 cm)  Min: 5' 6" (167.6 cm)  Max: 5' 6" (167.6 cm)  Weight  Av.2 kg (223 lb 1.7 oz)  Min: 101.2 kg (223 lb 1.7 oz)  Max: 101.2 kg (223 lb 1.7 oz)  I/O last 3 completed shifts:  In: 3010.7 [I.V.:46.9; IV Piggyback:2963.8]  Out: 600 [Urine:600]    Physical Examination:  General: A&Ox3, NAD  Head: Normocephalic, atraumatic, without obvious abnormality.  Eyes: Conjunctivae/corneas clear, anicteric sclera.  Mouth: OP without erythema nor exudates, MMM.  Poor dentition.  Lungs: Non-labored breathing, Symmetric chest rise, CTAB.  Heart: RRR, No murmurs, rub, nor click.  Abdomen: Soft, NT, ND, no obvious masses and/or organomegaly.  No peritoneal signs.  : No suprapubic tenderness nor distention. Sams in place.  Extremities: Left AKA with incision site without signs of infection. Right heel wrapped in guaze c/d/i.   Vascular: No obvious cyanosis, pallor, nor edema.   Skin: Multiple unstageable ulcers  Neurologic: No gross focal neurological deficits nor gross muscle weakness.  Psychiatric: Appropriate affect.    Laboratory:  Laboratory Data Reviewed: yes  Pertinent Findings:  CMP:   AGMA resolved, Glu: 136, Cr: 1.2 -> 0.9,   Alb: 1.6, AST/ALT increased to 89/60, bili / alk phos wnl    CBC:   WBC: 15.97 -> 13.2, Plts: 307, stable normocytic anemia    Phos: 2.5  M.1    Crp: 195  UA: Cloudy, +3 protein, +3 leukocytes, negative nitrites "   Procal: 0.90      Microbiology Data Reviewed: yes  Pertinent Findings:  Bcx's 5/19: 2/2 pending  Ucx 5/19: NGTD  Influenza A&B: Negative    Other Results:  EKG (my interpretation): Mobitz Type II heart block, no changes from previous ekg    Radiology Data Reviewed: yes  Pertinent Findings:  MRI Foot pending    Current Medications:     Infusions:       Scheduled:   apixaban  5 mg Oral BID    ceFEPime IV (PEDS and ADULTS)  2 g Intravenous Q12H    insulin detemir U-100  10 Units Subcutaneous Daily    metronidazole  500 mg Intravenous Q8H    vancomycin (VANCOCIN) IV (PEDS and ADULTS)  2,250 mg Intravenous Q24H        PRN:    Current Facility-Administered Medications:     dextrose 10%, 12.5 g, Intravenous, PRN    dextrose 10%, 25 g, Intravenous, PRN    glucagon (human recombinant), 1 mg, Intramuscular, PRN    glucose, 16 g, Oral, PRN    glucose, 24 g, Oral, PRN    HYDROcodone-acetaminophen, 1 tablet, Oral, Q6H PRN    insulin aspart U-100, 0-5 Units, Subcutaneous, QID (AC + HS) PRN    naloxone, 0.02 mg, Intravenous, PRN    sodium chloride 0.9%, 10 mL, Intravenous, Q12H PRN    Pharmacy to dose Vancomycin consult, , , Once **AND** vancomycin - pharmacy to dose, , Intravenous, pharmacy to manage frequency    Antibiotics and Day Number of Therapy:  Vancomycin 5/19 - present  Cefepime 5/19 - present  Flagyl 5/19 - present    Lines and Day Number of Therapy:  None    Assessment:     Saji Castañeda is a 74 y.o. 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  Initial evaluation pertinent for multiple pressure ulcers, now on broad coverage Abx.      Plan:     Sepsis likely 2/2 infected pressure wounds vs  UTI  Unstageable Sacral Pressure Ulcer x2  Unstageable Right Heel Pressure Ulcer  Right Hallux Infection  Chronic indwelling fung  On admission, patient febrile with malodorous pressure ulcers on physical exam and leukocytosis on labs.  Fung changed in ED, noted to of had potential purulence (documented as cloudy UOP).  - continue Vanc, cefepime, and flagyl; will need to tailor to Cx  - ID consulted and recommend continuing current therapy, f/u cx's, and evaluating arterial circulation to RLE  - Palliative to have GOC discussion today w patient and sister  - Bcxs and Ucx pending  - WC consulted, appreciate assistance.  - MRI foot pending     Transaminitis  AST/ALT: 89/59  - holding home statin  - avoiding hepatotoxic medications  - CTM    YNES on CKD3 - resolved  On admission Cr/eGFR 1.3/57, baseline 0.9-1.0/>60.  S/p 2L NS on admission.  - CTM    AGMA - resolved  Co2: 20, A; LA: 1.0, mild ketones in urine   - S/p 2 L in ED  - CTM     Elevated Troponin - resolved  # Suspected Type-II MI  - resolved w no symptoms during hospitalization     T2DM on long-term insulin  Last A1c >14 2 months prior to admission.  Home Medications: Levemir 9 u BID; Novalog TID  - Continue Detemir 10 u daily w SSI     PVD with diabetic ulcer s/p left AKA (3/27/2024)  Likely has ASCVD on Right side also, will make adequate perfusion for healing difficult.  - CTM     BPH s/p Chronic Fung  Fung changed in ED, noted to of had potential purulence (documented as cloudy UOP).  - see above in regards to potential UTI.  - Continue fung care.  - Start home flomax 0.4 mg daily     Chronic Multifactorial Anemia  On admission, Hgb stable at baseline; 8.3 and 7-8 respectively.  - CTM     Mild Hypokalemia  - replete prn     Paroxysmal A-fib on eliquis  Currently paced at the AV Node  - continue home eliquis 5 mg BID  - CTM     CHB s/p PPM (10/12/2023)  - CTM     Multifactorial HTN  Home medications: nifedipine 90 mg, Bidil 10 mg TID  - can resume  home anti-hypertensive medications as needed     HLD  - holding home statin due to transaminitis     Diet: Diabetic/Cardiac  Telemetry: no  VTE Ppx: Home Eliquis  Code Status: FULL     Dispo: Palliative consulted for GOC conversation; requires further IV abx    Belkys Bustillo MD  [facility] Internal Medicine [id number]    [facility] Medicine Hospitalist Pager numbers:   [facility] Hospitalist Medicine Team A (Lorne/Riki): [phone]  [facility] Hospitalist Medicine Team B (Arik/Lucio):  [phone]

## 2024-05-21 NOTE — PLAN OF CARE
SOCIAL WORK DISCHARGE PLANNING ASSESSMENT    SW completed discharge planning assessment. SW attempted to call pt's sister Kandy ([phone]) twice. No answer was noted either times and voicemail was unable to be left. Pt is a readmit. Pt resides at [facility]. SW sent referral to [facility] via careport so therefore pt can return upon discharge. Pt uses a wheelchair to help with ADL's at [facility]. Pt uses transportation through [facility] to get to doctor appointments. Pt will need transportation home following discharge.     Future Appointments   Date Time Provider Department Center   6/4/2024  2:20 PM Candy Goff, NP [facility] UROLOGY [facility]        Patient Active Problem List   Diagnosis    Tinea pedis    Wound, open, foot with complication    Type 2 diabetes mellitus with diabetic neuropathy, unspecified    Essential hypertension    Insulin dependent type 2 diabetes mellitus    Cataract cortical, senile    Anemia of chronic disease    Asymptomatic Mobitz (type) I (Wenckebach's) atrioventricular block    Peripheral arterial disease    PVD (peripheral vascular disease)    Morbid obesity    Leg swelling    Chronic deep vein thrombosis (DVT) of right upper extremity    Nonhealing ulcer of right lower leg with fat layer exposed    Hypergranulation    Osteomyelitis of right lower extremity    Paroxysmal atrial fibrillation    History of complete heart block    Blurred vision, left eye    Other hyperlipidemia    Stage 3b chronic kidney disease    History of amputation of left high mid foot     Long term current use of insulin    Chronic anticoagulation    BPH (benign prostatic hyperplasia)    Sepsis secondary to UTI      05/21/24 1430   Discharge Planning   Assessment Type Discharge Planning Assessment   Support Systems Family members  (pt's sister Kandy [phone], pt's sister January [phone], pt's brother Noe [phone], and pt's niece Karen [phone])   Equipment Currently Used at Home wheelchair    Current Living Arrangements residential facility   Care Facility Name [facility]   Patient/Family Anticipates Transition to long-term care facility   Patient/Family Anticipated Services at Transition none   DME Needed Upon Discharge    (TBD)   Discharge Plan A Return to nursing home

## 2024-05-21 NOTE — SUBJECTIVE & OBJECTIVE
Interval History: No acute events reported overnight.  Pt followed by [facility] still in progress. Continued wound care.     Past Medical History:   Diagnosis Date    Arthritis     legs    Bacteremia due to Gram-negative bacteria 3/23/2021    Diabetes mellitus     Diabetes mellitus, type 2     Hyperlipidemia     Hypertension     Osteomyelitis     Palliative care encounter 5/24/2023       Past Surgical History:   Procedure Laterality Date    ABOVE-KNEE AMPUTATION Left 3/27/2024    Procedure: AMPUTATION, ABOVE KNEE;  Surgeon: Adal Arellano MD;  Location: [facility] OR [address] [place] [address];  Service: Vascular;  Laterality: Left;    ANGIOGRAPHY OF LOWER EXTREMITY N/A 2/3/2021    Procedure: Angiogram Extremity Bilateral;  Surgeon: Ernst Chacko MD;  Location: [facility] OR [address] [place] [address];  Service: Peripheral Vascular;  Laterality: N/A;  7.4 mintues fluoroscopy time  816.15 mGy  170.17 Gycm2    AORTOGRAPHY WITH EXTREMITY RUNOFF Bilateral 2/3/2021    Procedure: AORTOGRAM, WITH EXTREMITY RUNOFF;  Surgeon: Ernst Chacko MD;  Location: [facility] OR [address] [place] [address];  Service: Peripheral Vascular;  Laterality: Bilateral;    DEBRIDEMENT OF FOOT Left 2/23/2021    Procedure: DEBRIDEMENT, LEFT HEEL;  Surgeon: Mayra Schroeder DPM;  Location: [facility] OR [address] [place] [address];  Service: Podiatry;  Laterality: Left;    FOOT AMPUTATION  October 2010    left high midfoot amputation    IMPLANTATION OF LEADLESS PACEMAKER N/A 10/12/2023    Procedure: [id number];  Surgeon: VANESSA Delatorre MD;  Location: [facility] EP LAB;  Service: Cardiology;  Laterality: N/A;  AVB, MICRA, EH, ANES, RM [id number]       Review of patient's allergies indicates:  No Known Allergies    Medications:  Continuous Infusions:  Scheduled Meds:   apixaban  5 mg Oral BID    ceFEPime IV (PEDS and ADULTS)  2 g Intravenous Q12H    insulin detemir U-100  10 Units Subcutaneous Daily    metronidazole  500 mg Intravenous Q8H    mupirocin   Nasal BID    polyethylene glycol  17 g Oral Daily     tamsulosin  0.4 mg Oral Daily    vancomycin (VANCOCIN) IV (PEDS and ADULTS)  2,250 mg Intravenous Q24H     PRN Meds:  Current Facility-Administered Medications:     dextrose 10%, 12.5 g, Intravenous, PRN    dextrose 10%, 25 g, Intravenous, PRN    glucagon (human recombinant), 1 mg, Intramuscular, PRN    glucose, 16 g, Oral, PRN    glucose, 24 g, Oral, PRN    insulin aspart U-100, 0-5 Units, Subcutaneous, QID (AC + HS) PRN    naloxone, 0.02 mg, Intravenous, PRN    oxyCODONE, 5 mg, Oral, Q6H PRN    sodium chloride 0.9%, 10 mL, Intravenous, Q12H PRN    Pharmacy to dose Vancomycin consult, , , Once **AND** vancomycin - pharmacy to dose, , Intravenous, pharmacy to manage frequency    Family History       Problem Relation (Age of Onset)    Cancer Brother    Diabetes Mother, Sister    Heart disease Mother    Stroke Sister          Tobacco Use    Smoking status: Never    Smokeless tobacco: Never   Substance and Sexual Activity    Alcohol use: No     Comment: occassional    Drug use: No    Sexual activity: Not Currently       Review of Systems   Constitutional:  Positive for fatigue.   Respiratory: Negative.     Musculoskeletal: Negative.    Skin:  Positive for wound.   Neurological:  Positive for weakness.     Objective:     Vital Signs (Most Recent):  Temp: 98.8 °F (37.1 °C) (05/21/24 1215)  Pulse: 69 (05/21/24 1215)  Resp: 18 (05/21/24 1215)  BP: 131/84 (05/21/24 1215)  SpO2: 98 % (05/21/24 1215) Vital Signs (24h Range):  Temp:  [97.7 °F (36.5 °C)-98.8 °F (37.1 °C)] 98.8 °F (37.1 °C)  Pulse:  [51-69] 69  Resp:  [16-18] 18  SpO2:  [95 %-99 %] 98 %  BP: (117-143)/(58-84) 131/84     Weight: 101.2 kg (223 lb 1.7 oz)  Body mass index is 36.01 kg/m².       Physical Exam  Vitals and nursing note reviewed.   Constitutional:       General: He is not in acute distress.     Appearance: He is ill-appearing (chronically).   HENT:      Head: Normocephalic.      Nose: Nose normal.      Mouth/Throat:      Mouth: Mucous membranes are  moist.   Cardiovascular:      Rate and Rhythm: Normal rate.   Pulmonary:      Effort: Pulmonary effort is normal.   Genitourinary:     Comments: Sams in place   Musculoskeletal:      Comments: L AKA well healed, right heal wrapped      Left Lower Extremity: Left leg is amputated above knee.   Skin:     General: Skin is warm and dry.      Coloration: Skin is ashen.      Findings: Lesion present.   Neurological:      General: No focal deficit present.      Mental Status: He is alert and oriented to person, place, and time.   Psychiatric:         Attention and Perception: Attention normal.         Mood and Affect: Mood normal.         Behavior: Behavior is cooperative.                  Review of Symptoms      Symptom Assessment (ESAS 0-10 Scale)  Pain:  0  Dyspnea:  0  Anxiety:  0  Nausea:  0  Depression:  3  Anorexia:  0  Fatigue:  5  Insomnia:  0  Restlessness:  0  Agitation:  0         Performance Status:  40    Living Arrangements:  Lives in nursing home    Psychosocial/Cultural:   See Palliative Psychosocial Note: No  Social Issues Identified: Coping deficit pt/family and New Diagnosis/Trauma  Bereavement Risk: No  Caregiver Needs Discussed. Caregiver Distress: No: Intensity of family caregiving and Caregiver Burnout Risk  Cultural: none identified at this time   **Primary  to Follow**  Palliative Care  Consult: No    Spiritual:  F - Kasey and Belief:  Uatsdin   I - Importance:  High      Time-Based Charting:  Yes  Chart Review: 21 minutes  Face to Face: 27 minutes  Symptom Assessment: 10 minutes  Coordination of Care: 15 minutes  Discharge Plannin minutes  Advance Care Plannin minutes  Goals of Care: 25 minutes    Total Time Spent: 131 minutes        Advance Care Planning   Advance Directives:   Living Will: No    LaPOST: Yes    Do Not Resuscitate Status: No    Medical Power of : Yes    Agent's Name:  Kandy Castañeda    Decision Making:  Patient answered questions and  Family answered questions  Goals of Care: What is most important right now is to focus on avoiding the hospital, remaining as independent as possible. Accordingly, we have decided that the best plan to meet the patient's goals includes continuing with treatment.         Significant Labs: All pertinent labs within the past 24 hours have been reviewed.  CBC:   Recent Labs   Lab 05/21/24  0550   WBC 13.20*   HGB 8.2*   HCT 27.7*   MCV 85        BMP:  Recent Labs   Lab 05/21/24  0550   *   *   K 4.1      CO2 21*   BUN 16   CREATININE 0.9   CALCIUM 8.3*   MG 2.1     LFT:  Lab Results   Component Value Date    AST 89 (H) 05/21/2024    ALKPHOS 61 05/21/2024    BILITOT 0.3 05/21/2024     Albumin:   Albumin   Date Value Ref Range Status   05/21/2024 1.6 (L) 3.5 - 5.2 g/dL Final     Protein:   Total Protein   Date Value Ref Range Status   05/21/2024 6.4 6.0 - 8.4 g/dL Final     Lactic acid:   Lab Results   Component Value Date    LACTATE 1.0 05/20/2024    LACTATE 1.0 05/19/2024       Significant Imaging: I have reviewed all pertinent imaging results/findings within the past 24 hours.

## 2024-05-21 NOTE — ASSESSMENT & PLAN NOTE
"Mr. Saji Castañeda is a 74 y/oM with medical comorbidities of T2DM on long-term insulin, PVD with diabetic ulcer s/p left AKA (3/27/2024), Chronic Multifactorial Anemia, BPH s/p Chronic Sams, paroxysmal A-fib on eliquis, CHB s/p PPM (10/12/2023), CKD3, Multifactorial HTN, and HLD who presented to [facility] on 5/19/2024 with a primary complaint of Acute Encephalopathy.  Pt admitted septic from presumed UTI, pt also with multiple skin wounds.     -At time of encounter, pt AAOx4, in good spirits.  Does not recall events leading up to admission but aware that confusion was due to suspected infection.  Pt is a resident of [facility] and will return there upon discharge.   -MPOA documentation completed at the bedside. Pt names his sister Kandy Castañeda as MPOA #1 and sister January Nicholson as MPOA #2.  MPOA loaded into EMR and copy given to pts sister at the bedside.   -LaPOST from NH found in pts chart, scanned and uploaded into EMR.  LaPOST with CPR, full treatment, antibiotic use, and a trial period of artificial nutrition by tube selected.   -Met with pt and family at the bedside to discuss GOC moving forward. Pts sister Kandy Castañeda, pts nephew and 2 family friends were present for conversation.   -We discussed multiple admissions for osteo/sepsis over the past 8 months, reviewed photographs of wounds with family present at the bedside.  After visualizing wounds, family now grasps magnitude of wounds and accepts that wound care and antibiotics have been adequately used.  They understand that realistically wounds will not be "cured" even with continued antibiotics and wound care alone and that pt is not a candidate for ostomy/ flap procedure. While pt and family verbalize understanding, they express that they do not accept any limitations on interventions and wish for all life preserving measures.   -Topic of hospice was introduced but this option does not align with family goals.   -Pt " remains a full code and requests aggressive care.

## 2024-05-21 NOTE — NURSING
RAPID RESPONSE NURSE PROACTIVE ROUNDING NOTE       Time of Visit: 320    Admit Date: 2024  LOS: 2  Code Status: Full Code   Date of Visit: 2024  : 1950  Age: 74 y.o.  Sex: male  Race: Black or   Bed: K470/K470 A:   MRN: [id number]  Was the patient discharged from an ICU this admission? No   Was the patient discharged from a PACU within last 24 hours? No   Did the patient receive conscious sedation/general anesthesia in last 24 hours? No   Was the patient in the ED within the past 24 hours? Yes   Was the patient on NIPPV within the past 24 hours? No   Attending Physician: Kumar Valdes MD  Primary Service: [facility] Hospitalists Team B-Internal Medicine   Time spent at the bedside: < 15 min    SITUATION    Notified by bedside RN via phone call  Reason for alert: PIV access    Diagnosis: Sepsis secondary to UTI   has a past medical history of Arthritis, Bacteremia due to Gram-negative bacteria, Diabetes mellitus, Diabetes mellitus, type 2, Hyperlipidemia, Hypertension, Osteomyelitis, and Palliative care encounter.    Last Vitals:  Temp: 97.7 °F (36.5 °C) ( 233)  Pulse: 52 ( 0015)  Resp: 18 ( 233)  BP: 121/59 (2335)  SpO2: 99 % (2335)    24 Hour Vitals Range:  Temp:  [97.7 °F (36.5 °C)-98.7 °F (37.1 °C)]   Pulse:  [52-83]   Resp:  [16-20]   BP: (121-143)/(55-65)   SpO2:  [95 %-100 %]     Contacted for PIV access. 20g PIV placed to R forearm with ultrasound guidance.    FOLLOW UP    Call back the Rapid Response NurseStalin at [phone] for additional questions or concerns.

## 2024-05-21 NOTE — HPI
"Per chart, "Saji Castañeda is a 74 y.o. male with medical comorbidities of T2DM on long-term insulin, PVD with diabetic ulcer s/p left AKA (3/27/2024), Chronic Multifactorial Anemia, BPH s/p Chronic Fung, paroxysmal A-fib on eliquis, CHB s/p PPM (10/12/2023), CKD3, Multifactorial HTN, and HLD who presented to [facility] on 5/19/2024 with a primary complaint of Acute Encephalopathy for 1 day.     The patient was in their usual state of health until this morning when the nursing staff at [facility] noted the patient to be acutely encephalopathic associated with fever and sacral pain.  Patient unable to provide history overall due to current mental status.     Reported during ED Course, unstagable sacral ulcers and chronic fung with cloudy UOP."  "

## 2024-05-22 PROBLEM — R41.82 ALTERED MENTAL STATUS: Status: ACTIVE | Noted: 2024-05-22

## 2024-05-22 LAB
ACANTHOCYTES BLD QL SMEAR: PRESENT
ALBUMIN SERPL BCP-MCNC: 1.5 G/DL (ref 3.5–5.2)
ALP SERPL-CCNC: 62 U/L (ref 55–135)
ALT SERPL W/O P-5'-P-CCNC: 53 U/L (ref 10–44)
ANION GAP SERPL CALC-SCNC: 4 MMOL/L (ref 8–16)
ANISOCYTOSIS BLD QL SMEAR: SLIGHT
AST SERPL-CCNC: 56 U/L (ref 10–40)
BACTERIA UR CULT: ABNORMAL
BASOPHILS # BLD AUTO: 0.02 K/UL (ref 0–0.2)
BASOPHILS NFR BLD: 0.2 % (ref 0–1.9)
BILIRUB SERPL-MCNC: 0.3 MG/DL (ref 0.1–1)
BUN SERPL-MCNC: 13 MG/DL (ref 8–23)
BURR CELLS BLD QL SMEAR: ABNORMAL
CALCIUM SERPL-MCNC: 8.3 MG/DL (ref 8.7–10.5)
CHLORIDE SERPL-SCNC: 104 MMOL/L (ref 95–110)
CO2 SERPL-SCNC: 30 MMOL/L (ref 23–29)
CREAT SERPL-MCNC: 0.8 MG/DL (ref 0.5–1.4)
DIFFERENTIAL METHOD BLD: ABNORMAL
EOSINOPHIL # BLD AUTO: 0.9 K/UL (ref 0–0.5)
EOSINOPHIL NFR BLD: 10.4 % (ref 0–8)
ERYTHROCYTE [DISTWIDTH] IN BLOOD BY AUTOMATED COUNT: 19.5 % (ref 11.5–14.5)
EST. GFR  (NO RACE VARIABLE): >60 ML/MIN/1.73 M^2
GLUCOSE SERPL-MCNC: 166 MG/DL (ref 70–110)
HCT VFR BLD AUTO: 24.2 % (ref 40–54)
HGB BLD-MCNC: 7.6 G/DL (ref 14–18)
HYPOCHROMIA BLD QL SMEAR: ABNORMAL
IMM GRANULOCYTES # BLD AUTO: 0.04 K/UL (ref 0–0.04)
IMM GRANULOCYTES NFR BLD AUTO: 0.4 % (ref 0–0.5)
LYMPHOCYTES # BLD AUTO: 1.4 K/UL (ref 1–4.8)
LYMPHOCYTES NFR BLD: 15.7 % (ref 18–48)
MAGNESIUM SERPL-MCNC: 2 MG/DL (ref 1.6–2.6)
MCH RBC QN AUTO: 25.6 PG (ref 27–31)
MCHC RBC AUTO-ENTMCNC: 31.4 G/DL (ref 32–36)
MCV RBC AUTO: 82 FL (ref 82–98)
MONOCYTES # BLD AUTO: 0.3 K/UL (ref 0.3–1)
MONOCYTES NFR BLD: 3.4 % (ref 4–15)
NEUTROPHILS # BLD AUTO: 6.3 K/UL (ref 1.8–7.7)
NEUTROPHILS NFR BLD: 69.9 % (ref 38–73)
NRBC BLD-RTO: 0 /100 WBC
OHS QRS DURATION: 92 MS
OHS QTC CALCULATION: 467 MS
OVALOCYTES BLD QL SMEAR: ABNORMAL
PHOSPHATE SERPL-MCNC: 2.3 MG/DL (ref 2.7–4.5)
PLATELET # BLD AUTO: 379 K/UL (ref 150–450)
PLATELET BLD QL SMEAR: ABNORMAL
PMV BLD AUTO: 9.8 FL (ref 9.2–12.9)
POCT GLUCOSE: 160 MG/DL (ref 70–110)
POCT GLUCOSE: 178 MG/DL (ref 70–110)
POCT GLUCOSE: 194 MG/DL (ref 70–110)
POCT GLUCOSE: 232 MG/DL (ref 70–110)
POIKILOCYTOSIS BLD QL SMEAR: ABNORMAL
POTASSIUM SERPL-SCNC: 3.5 MMOL/L (ref 3.5–5.1)
PROT SERPL-MCNC: 6 G/DL (ref 6–8.4)
RBC # BLD AUTO: 2.97 M/UL (ref 4.6–6.2)
SODIUM SERPL-SCNC: 138 MMOL/L (ref 136–145)
WBC # BLD AUTO: 8.94 K/UL (ref 3.9–12.7)

## 2024-05-22 PROCEDURE — 11000001 HC ACUTE MED/SURG PRIVATE ROOM

## 2024-05-22 PROCEDURE — 25000003 PHARM REV CODE 250: Performed by: STUDENT IN AN ORGANIZED HEALTH CARE EDUCATION/TRAINING PROGRAM

## 2024-05-22 PROCEDURE — 25000003 PHARM REV CODE 250

## 2024-05-22 PROCEDURE — 63600175 PHARM REV CODE 636 W HCPCS: Performed by: STUDENT IN AN ORGANIZED HEALTH CARE EDUCATION/TRAINING PROGRAM

## 2024-05-22 PROCEDURE — 85025 COMPLETE CBC W/AUTO DIFF WBC: CPT | Performed by: STUDENT IN AN ORGANIZED HEALTH CARE EDUCATION/TRAINING PROGRAM

## 2024-05-22 PROCEDURE — 80053 COMPREHEN METABOLIC PANEL: CPT | Performed by: STUDENT IN AN ORGANIZED HEALTH CARE EDUCATION/TRAINING PROGRAM

## 2024-05-22 PROCEDURE — 25000003 PHARM REV CODE 250: Performed by: INTERNAL MEDICINE

## 2024-05-22 PROCEDURE — 83735 ASSAY OF MAGNESIUM: CPT | Performed by: STUDENT IN AN ORGANIZED HEALTH CARE EDUCATION/TRAINING PROGRAM

## 2024-05-22 PROCEDURE — 84100 ASSAY OF PHOSPHORUS: CPT | Performed by: STUDENT IN AN ORGANIZED HEALTH CARE EDUCATION/TRAINING PROGRAM

## 2024-05-22 PROCEDURE — 36415 COLL VENOUS BLD VENIPUNCTURE: CPT | Performed by: STUDENT IN AN ORGANIZED HEALTH CARE EDUCATION/TRAINING PROGRAM

## 2024-05-22 PROCEDURE — 63600175 PHARM REV CODE 636 W HCPCS: Mod: JZ,JG | Performed by: EMERGENCY MEDICINE

## 2024-05-22 RX ORDER — METRONIDAZOLE 500 MG/1
500 TABLET ORAL EVERY 8 HOURS
Status: DISCONTINUED | OUTPATIENT
Start: 2024-05-22 | End: 2024-05-22

## 2024-05-22 RX ADMIN — METRONIDAZOLE 500 MG: 500 INJECTION, SOLUTION INTRAVENOUS at 09:05

## 2024-05-22 RX ADMIN — INSULIN ASPART 2 UNITS: 100 INJECTION, SOLUTION INTRAVENOUS; SUBCUTANEOUS at 12:05

## 2024-05-22 RX ADMIN — APIXABAN 5 MG: 5 TABLET, FILM COATED ORAL at 09:05

## 2024-05-22 RX ADMIN — CEFEPIME 2 G: 2 INJECTION, POWDER, FOR SOLUTION INTRAVENOUS at 08:05

## 2024-05-22 RX ADMIN — TAMSULOSIN HYDROCHLORIDE 0.4 MG: 0.4 CAPSULE ORAL at 09:05

## 2024-05-22 RX ADMIN — CEFEPIME 2 G: 2 INJECTION, POWDER, FOR SOLUTION INTRAVENOUS at 09:05

## 2024-05-22 RX ADMIN — MUPIROCIN: 20 OINTMENT TOPICAL at 09:05

## 2024-05-22 RX ADMIN — METRONIDAZOLE 500 MG: 500 TABLET ORAL at 03:05

## 2024-05-22 RX ADMIN — METRONIDAZOLE 500 MG: 500 INJECTION, SOLUTION INTRAVENOUS at 02:05

## 2024-05-22 RX ADMIN — MUPIROCIN: 20 OINTMENT TOPICAL at 08:05

## 2024-05-22 RX ADMIN — INSULIN DETEMIR 10 UNITS: 100 INJECTION, SOLUTION SUBCUTANEOUS at 09:05

## 2024-05-22 RX ADMIN — APIXABAN 5 MG: 5 TABLET, FILM COATED ORAL at 08:05

## 2024-05-22 RX ADMIN — OXYCODONE 5 MG: 5 TABLET ORAL at 03:05

## 2024-05-22 NOTE — PLAN OF CARE
Problem: Adult Inpatient Plan of Care  Goal: Plan of Care Review  Outcome: Not Progressing  Goal: Patient-Specific Goal (Individualized)  Outcome: Not Progressing  Goal: Absence of Hospital-Acquired Illness or Injury  Outcome: Not Progressing  Goal: Optimal Comfort and Wellbeing  Outcome: Not Progressing  Goal: Readiness for Transition of Care  Outcome: Not Progressing     Problem: Coping Ineffective  Goal: Effective Coping  Outcome: Not Progressing     Problem: Diabetes Comorbidity  Goal: Blood Glucose Level Within Targeted Range  Outcome: Not Progressing     Problem: Sepsis/Septic Shock  Goal: Optimal Coping  Outcome: Not Progressing  Goal: Absence of Bleeding  Outcome: Not Progressing  Goal: Blood Glucose Level Within Targeted Range  Outcome: Not Progressing  Goal: Absence of Infection Signs and Symptoms  Outcome: Not Progressing  Goal: Optimal Nutrition Intake  Outcome: Not Progressing     Problem: Comorbidity Management  Goal: Blood Pressure in Desired Range  Outcome: Not Progressing     Problem: Confusion Acute  Goal: Optimal Cognitive Function  Outcome: Not Progressing     Problem: UTI (Urinary Tract Infection)  Goal: Improved Infection Symptoms  Outcome: Not Progressing     Problem: Wound  Goal: Optimal Coping  Outcome: Not Progressing  Goal: Optimal Functional Ability  Outcome: Not Progressing  Goal: Absence of Infection Signs and Symptoms  Outcome: Not Progressing  Goal: Improved Oral Intake  Outcome: Not Progressing  Goal: Optimal Pain Control and Function  Outcome: Not Progressing  Goal: Skin Health and Integrity  Outcome: Not Progressing  Goal: Optimal Wound Healing  Outcome: Not Progressing     Problem: Fall Injury Risk  Goal: Absence of Fall and Fall-Related Injury  Outcome: Not Progressing     Problem: Skin Injury Risk Increased  Goal: Skin Health and Integrity  Outcome: Not Progressing     Problem: Infection  Goal: Absence of Infection Signs and Symptoms  Outcome: Not Progressing

## 2024-05-22 NOTE — PLAN OF CARE
Recommendations  Recommendation:   1. Continue diabetic diet 2000 kcals  2. Add Boost Glucose Control TID   3. Add Kvng BID for wound healing   4. Monitor weight and labs    Goals: Pt will consume 50-75% of meals by RD follow up    Nutrition Goal Status: new  Communication of RD Recs:  (POC)

## 2024-05-22 NOTE — PROGRESS NOTES
"[facility] Hospital Medicine Progress Note    Primary Team: [facility] Hospitalist Team B  Attending Physician: Kumar Valdes MD  Resident: Melony  Intern: Iwona    Subjective:      NAEO. AFVSS; pulse in the 50's patient asymptomatic. [facility] meeting determined patient is to remain full code with all life perserving measures. Patient understands wounds will highly likely not be "cured". Patient is on day three of Vanc/Cefepime/Flagyl     Objective:     Last 24 Hour Vital Signs:  BP  Min: 125/60  Max: 136/64  Temp  Av.6 °F (37 °C)  Min: 98.2 °F (36.8 °C)  Max: 99 °F (37.2 °C)  Pulse  Av.7  Min: 49  Max: 69  Resp  Av  Min: 18  Max: 18  SpO2  Av.5 %  Min: 97 %  Max: 100 %  Weight  Av.5 kg (208 lb 3.6 oz)  Min: 94.4 kg (208 lb 1.8 oz)  Max: 94.5 kg (208 lb 5.4 oz)  I/O last 3 completed shifts:  In: -   Out: 1900 [Urine:1900]    Physical Examination:  General: A&Ox3, NAD  Head: Normocephalic, atraumatic, without obvious abnormality.  Eyes: Conjunctivae/corneas clear, anicteric sclera.  Mouth: OP without erythema nor exudates, MMM.  Poor dentition.  Lungs: Non-labored breathing, Symmetric chest rise, CTAB.  Heart: RRR, No murmurs, rub, nor click.  Abdomen: Soft, NT, ND, no obvious masses and/or organomegaly.  No peritoneal signs.  : No suprapubic tenderness nor distention. Sams in place.  Extremities: Left AKA with incision site without signs of infection. Right heel wrapped in guaze c/d/i.   Vascular: No obvious cyanosis, pallor, nor edema.   Skin: Multiple unstageable ulcers  Neurologic: No gross focal neurological deficits nor gross muscle weakness.  Psychiatric: Appropriate affect.    Laboratory:  Laboratory Data Reviewed: yes  Pertinent Findings:  CMP:   LFT's improving, Glu: 166, Cr: 1.2 -> 0.8,   Alb: 1.6, AST/ALT increased to 89/60, bili / alk phos wnl    CBC:   WBC: 15.97 -> 8.9, Plts: 379, stable normocytic anemia    Phos: 2.3  M.0    Crp: 195  UA: Cloudy, +3 protein, +3 leukocytes, " negative nitrites   Procal: 0.90      Microbiology Data Reviewed: yes  Pertinent Findings:  Bcx's 5/19: 2/2 NG@3days  Ucx 5/19: 10,000 - 50,000 yeast  Influenza A&B: Negative    Other Results:  EKG (my interpretation): Mobitz Type II heart block, no changes from previous ekg    Radiology Data Reviewed: yes  Pertinent Findings:  None    Current Medications:     Infusions:       Scheduled:   apixaban  5 mg Oral BID    ceFEPime IV (PEDS and ADULTS)  2 g Intravenous Q12H    insulin detemir U-100  10 Units Subcutaneous Daily    metronidazole  500 mg Intravenous Q8H    mupirocin   Nasal BID    polyethylene glycol  17 g Oral Daily    tamsulosin  0.4 mg Oral Daily    vancomycin (VANCOCIN) IV (PEDS and ADULTS)  2,250 mg Intravenous Q24H        PRN:    Current Facility-Administered Medications:     dextrose 10%, 12.5 g, Intravenous, PRN    dextrose 10%, 25 g, Intravenous, PRN    glucagon (human recombinant), 1 mg, Intramuscular, PRN    glucose, 16 g, Oral, PRN    glucose, 24 g, Oral, PRN    insulin aspart U-100, 0-5 Units, Subcutaneous, QID (AC + HS) PRN    naloxone, 0.02 mg, Intravenous, PRN    oxyCODONE, 5 mg, Oral, Q6H PRN    sodium chloride 0.9%, 10 mL, Intravenous, Q12H PRN    Pharmacy to dose Vancomycin consult, , , Once **AND** vancomycin - pharmacy to dose, , Intravenous, pharmacy to manage frequency    Antibiotics and Day Number of Therapy:  Vancomycin 5/19 - present  Cefepime 5/19 - present  Flagyl 5/19 - present    Lines and Day Number of Therapy:  None    Assessment:     Saji Castañeda is a 74 y.o. 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  Initial evaluation pertinent for multiple  pressure ulcers, now on broad coverage Abx.      Plan:     Sepsis likely 2/2 infected pressure wounds vs UTI  Unstageable Sacral Pressure Ulcer x2  Unstageable Right Heel Pressure Ulcer  Right Hallux Infection  Chronic indwelling fung  On admission, patient febrile with malodorous pressure ulcers on physical exam and leukocytosis on labs.  Fung changed in ED, noted to of had potential purulence (documented as cloudy UOP).  - continue Vanc, cefepime, and flagyl; will need to tailor to Cx  - ID consulted and recommend continuing current therapy, f/u cx's, and evaluating arterial circulation to RLE  - Palliative consulted, GOC remain to pursue all medical avenues for treatment and use all life preserving measures if indicated.  - Bcxs ng@3d  - Ucx growing < 100 k yeast; holding off on therapy at this time  - WC consulted, appreciate assistance.     Transaminitis  AST/ALT: 89/59  - holding home statin  - avoiding hepatotoxic medications  - CTM    YNES on CKD3 - resolved  On admission Cr/eGFR 1.3/57, baseline 0.9-1.0/>60.  S/p 2L NS on admission.  - CTM    AGMA - resolved  Co2: 20, A; LA: 1.0, mild ketones in urine   - S/p 2 L in ED  - CTM     Elevated Troponin - resolved  # Suspected Type-II MI  - resolved w no symptoms during hospitalization     T2DM on long-term insulin  Last A1c >14 2 months prior to admission.  Home Medications: Levemir 9 u BID; Novalog TID  - Continue Detemir 10 u daily w SSI     PVD with diabetic ulcer s/p left AKA (3/27/2024)  Likely has ASCVD on Right side also, will make adequate perfusion for healing difficult.  - CTM     BPH s/p Chronic Fung  Fung changed in ED, noted to of had potential purulence (documented as cloudy UOP).  - see above in regards to potential UTI.  - Continue fung care.  - Start home flomax 0.4 mg daily     Chronic Multifactorial Anemia  On admission, Hgb stable at baseline; 8.3 and 7-8 respectively.  - CTM     Mild Hypokalemia  - replete prn     Paroxysmal A-fib  on eliquis  Currently paced at the AV Node  - continue home eliquis 5 mg BID  - CTM     CHB s/p PPM (10/12/2023)  - CTM     Multifactorial HTN  Home medications: nifedipine 90 mg, Bidil 10 mg TID  - can resume home anti-hypertensive medications as needed     HLD  - holding home statin due to transaminitis     Diet: Diabetic/Cardiac  Telemetry: no  VTE Ppx: Home Eliquis  Code Status: FULL     Dispo: Pending cultures and abx recs    Belkys Bustillo MD  [facility] Internal Medicine [id number]    [facility] Medicine Hospitalist Pager numbers:   [facility] Hospitalist Medicine Team A (Lorne/Riki): [phone]  [facility] Hospitalist Medicine Team B (Arik/Lucio):  [phone]

## 2024-05-22 NOTE — PROGRESS NOTES
Pharmacist Intervention IV to PO Note    Saji Castañeda is a 74 y.o. male being treated with IV medication metronidazole    Patient Data:    Vital Signs (Most Recent):  Temp: 98.5 °F (36.9 °C) (05/22/24 1110)  Pulse: (!) 53 (05/22/24 1253)  Resp: 18 (05/22/24 1110)  BP: 130/63 (05/22/24 1110)  SpO2: 96 % (05/22/24 1110) Vital Signs (72h Range):  Temp:  [97.7 °F (36.5 °C)-102.3 °F (39.1 °C)]   Pulse:  []   Resp:  [16-27]   BP: (117-143)/(55-94)   SpO2:  [85 %-100 %]      CBC:  Recent Labs   Lab 05/19/24 2059 05/20/24  0555 05/21/24  0550   WBC 15.13* 15.97* 13.20*   RBC 3.24* 3.50* 3.26*   HGB 8.3* 8.7* 8.2*   HCT 26.2* 29.0* 27.7*    394 307   MCV 81* 83 85   MCH 25.6* 24.9* 25.2*   MCHC 31.7* 30.0* 29.6*     CMP:     Recent Labs   Lab 05/19/24 2100 05/20/24  0555 05/21/24  0550   * 305* 136*   CALCIUM 8.9 8.5* 8.3*   ALBUMIN 1.9* 1.8* 1.6*   PROT 7.4 7.1 6.4    136 135*   K 3.3* 3.5 4.1   CO2 26 20* 21*   CL 99 100 103   BUN 16 16 16   CREATININE 1.3 1.2 0.9   ALKPHOS 71 67 61   ALT 48* 46* 60*   AST 50* 49* 89*   BILITOT 0.5 0.5 0.3       Dietary Orders:  Diet Orders            Diet diabetic 2000 Calorie; Standard Tray: Diabetic starting at 05/20 0842            Based on the following criteria, this patient qualifies for intravenous to oral conversion:  [x] The patients gastrointestinal tract is functioning (tolerating medications via oral or enteral route for 24 hours and tolerating food or enteral feeds for 24 hours).  [x] The patient is hemodynamically stable for 24 hours (heart rate <100 beats per minute, systolic blood pressure >99 mm Hg, and respiratory rate <20 breaths per minute).  [x] The patient shows clinical improvement (afebrile for at least 24 hours and white blood cell count downtrending or normalized). Additionally, the patient must be non-neutropenic (absolute neutrophil count >500 cells/mm3).  [x] For antimicrobials, the patient has received IV therapy for at least 24  hours.    IV medication metronidazole will be changed to oral medication     Pharmacist's Name: Raymond White  Pharmacist's Extension: [phone]

## 2024-05-22 NOTE — CONSULTS
[facility] - Telemetry  Adult Nutrition  Consult Note    SUMMARY     Recommendations  Recommendation:   1. Continue diabetic diet 2000 kcals  2. Add Boost Glucose Control TID   3. Add Kvng BID for wound healing   4. Monitor weight and labs    Goals: Pt will consume 50-75% of meals by RD follow up    Nutrition Goal Status: new  Communication of RD Recs:  (POC)    Assessment and Plan  Nutrition Problem  Increased protein needs     Related to (etiology):   Wound healing     Signs and Symptoms (as evidenced by):   Tomas- 13/left buttocks, left dorsal greater trochanter, right posterior heel, right buttocks, left ischial tuberosity, right toe    Interventions:  Collaboration with other providers   Commercial beverage   Increased protein diet     Nutrition Diagnosis Status:   New    Malnutrition Assessment  Weight Loss (Malnutrition):  (Noted 56 lb weight loss in 5 months)     Reason for Assessment  Reason For Assessment: consult (Wounds)  Diagnosis: infection/sepsis  Relevant Medical History: Arhtritis, DM, HLD, HTN, bacteremia, L AKA 3/24  Interdisciplinary Rounds: did not attend  General Information Comments: Pt is currently on a diabetic diet 2000 kcals. Tomas-13/left buttocks, left dorsal greater trochanter, right posterior heel, right buttocks, left ischial tuberosity, right toe. No meal intake noted. Palliative discussed multiple admits over the last 8 months. 5/8- pt transferred from [facility] to hospital. 5/13-pt discharged to complete 10 days of bactrum. 5/19- pt transferred from nursing home to hospital for fever and AMS. Noted 56 lb weight loss in 5 months. Unable to conduct NFPE at this time. Pt stated he was eating 3 meals a day before he came to the [facility]. Reported some diarrhea. Pt stated he has lost weight, but is unsure how much.  Nutrition Discharge Planning: d/c on diabetic/cardiac diet    Nutrition Risk Screen  Nutrition Risk Screen: large or nonhealing wound, burn or pressure  "injury    Nutrition/Diet History  Patient Reported Diet/Restrictions/Preferences: diabetic diet, heart healthy    Nutrition Related Social Determinants of Health: SDOH: Unable to assess at this time.     Anthropometrics  Temp: 98.5 °F (36.9 °C)  Height Method: Stated (per medical record)  Height: 5' 6" (167.6 cm)  Height (inches): 66 in  Weight Method: Bed Scale  Weight: 94.5 kg (208 lb 5.4 oz)  Weight (lb): 208.34 lb  Ideal Body Weight (IBW), Male: 142 lb  % Ideal Body Weight, Male (lb): 146.72 %  BMI (Calculated): 33.6  BMI Grade: 30 - 34.9- obesity - grade I  Usual Body Weight (UBW), k.7 kg (12/15/23)  % Usual Body Weight: 79.11  % Weight Change From Usual Weight: -21.05 %     Lab/Procedures/Meds  Pertinent Labs Reviewed: reviewed  Pertinent Labs Comments: Na 135, CO2 21, Calcium 8.3, Glucose 136, phos 2.5, AST 89, ALT 60  Pertinent Medications Reviewed: reviewed  Pertinent Medications Comments: insulin, metronidazole, tamsulosin, vancomycin    Estimated/Assessed Needs  Weight Used For Calorie Calculations: 94.5 kg (208 lb 5.4 oz)  Energy Calorie Requirements (kcal): 1946 kcals (SF 1.2)  Energy Need Method: [place]  Protein Requirements: 113g (1.2 g/kg)  Weight Used For Protein Calculations: 94.5 kg (208 lb 5.4 oz)     Estimated Fluid Requirement Method: RDA Method  RDA Method (mL): 1946  CHO Requirement: 240g    Nutrition Prescription Ordered  Current Diet Order: diabetic deit 2000 kcals    Evaluation of Received Nutrient/Fluid Intake  I/O:   Energy Calories Required: not meeting needs  Protein Required: not meeting needs  Fluid Required: not meeting needs  Comments: LBM-24  Tolerance: not tolerating  % Intake of Estimated Energy Needs: Other: No meal intake recorded  % Meal Intake: Other: No meal intake recorded    Nutrition Risk  Level of Risk/Frequency of Follow-up:  (1x/weekly)     Monitor and Evaluation  Food and Nutrient Intake: energy intake, food and beverage intake  Food and " Nutrient Adminstration: diet order  Anthropometric Measurements: weight, weight change, body mass index  Biochemical Data, Medical Tests and Procedures: electrolyte and renal panel, gastrointestinal profile, glucose/endocrine profile, inflammatory profile, lipid profile     Nutrition Follow-Up  RD Follow-up?: Yes

## 2024-05-22 NOTE — CONSULTS
[facility] - Telemetry  Wound Care    Patient Name:  Saji Castañeda   MRN:  [id number]  Date: 5/22/2024  Diagnosis: Sepsis secondary to UTI    History:     Past Medical History:   Diagnosis Date    Arthritis     legs    Bacteremia due to Gram-negative bacteria 3/23/2021    Diabetes mellitus     Diabetes mellitus, type 2     Hyperlipidemia     Hypertension     Osteomyelitis     Palliative care encounter 5/24/2023       Social History     Socioeconomic History    Marital status: Single   Tobacco Use    Smoking status: Never    Smokeless tobacco: Never   Substance and Sexual Activity    Alcohol use: No     Comment: occassional    Drug use: No    Sexual activity: Not Currently   Social History Narrative    Not currently working; lives with family     Social Determinants of Health     Financial Resource Strain: Medium Risk (3/5/2024)    Overall Financial Resource Strain (CARDIA)     Difficulty of Paying Living Expenses: Somewhat hard   Food Insecurity: No Food Insecurity (3/5/2024)    Hunger Vital Sign     Worried About Running Out of Food in the Last Year: Never true     Ran Out of Food in the Last Year: Never true   Transportation Needs: No Transportation Needs (3/5/2024)    PRAPARE - Transportation     Lack of Transportation (Medical): No     Lack of Transportation (Non-Medical): No   Physical Activity: Inactive (3/5/2024)    Exercise Vital Sign     Days of Exercise per Week: 0 days     Minutes of Exercise per Session: 0 min   Stress: No Stress Concern Present (3/5/2024)    [employer] - Occupational Stress Questionnaire     Feeling of Stress : Only a little   Housing Stability: Low Risk  (3/5/2024)    Housing Stability Vital Sign     Unable to Pay for Housing in the Last Year: No     Number of Places Lived in the Last Year: 1     Unstable Housing in the Last Year: No       Precautions:     Allergies as of 05/19/2024    (No Known Allergies)       WOC Assessment Details/Treatment     Skin  assessment completed   Media not available     Right heel with partial thickness pressure injury pink center, periwound with macerated skin.     Sacrum with full thickness stage 3 pressure injury with irregular edges, red center periwound dry.     Perineum and scrotum with excoriation.     Recommendations discussed with patient, nurse and Dr Bustillo.     Pressure injury prevention interventions:   - Waffle overlay  - Right heel boot   -   Right heel : tegaderm, gauze, roll gauze.   - Sacrum and perineum - Triad BID covered with ABD pads and taped secure.

## 2024-05-22 NOTE — PROGRESS NOTES
[facility] ID note    Patient afebrile.  WBC 8.94.  On vancomycin, cefepime, metronidazole    Urine culture with Candida glabrata  Blood cultures with coag-negative staph 1 set.  Second set negative    Would continue wound care as you were doing  Discontinue antibiotics in a graded fashion with vancomycin and metronidazole 1st then cefepime  Monitor patient progress    Please call if any questions   Sameer Gayle  [facility] ID  [phone]

## 2024-05-22 NOTE — PLAN OF CARE
SW informed [facility] of pt's anticipated discharge date.     SW will continue to follow.     Future Appointments   Date Time Provider Department Center   6/4/2024  2:20 PM Candy Goff NP [facility] UROLOGY [facility]       05/22/24 2758   Post-Acute Status   Post-Acute Authorization Placement

## 2024-05-22 NOTE — PROGRESS NOTES
Pharmacokinetic Assessment Follow Up: IV Vancomycin    Vancomycin serum concentration assessment(s):    The trough level was drawn correctly and can be used to guide therapy at this time. The measurement is within the desired definitive target range of 15 to 20 mcg/mL.    Vancomycin Regimen Plan:    Change regimen to Vancomycin 2000 mg IV every 24 hours with next serum trough concentration measured at 2000 prior to 3rd dose on 5/24    Drug levels (last 3 results):  Recent Labs   Lab Result Units 05/21/24  2031   Vancomycin-Trough ug/mL 20.2       Pharmacy will continue to follow and monitor vancomycin.    Please contact pharmacy at extension [phone] for questions regarding this assessment.    Thank you for the consult,   Raymond White       Patient brief summary:  Saji Castañeda is a 74 y.o. male initiated on antimicrobial therapy with IV Vancomycin for treatment of skin & soft tissue infection    The patient's current regimen is     Drug Allergies:   Review of patient's allergies indicates:  No Known Allergies    Actual Body Weight:       Renal Function:   Estimated Creatinine Clearance: 76.3 mL/min (based on SCr of 0.9 mg/dL).,     Dialysis Method (if applicable):    CBC (last 72 hours):  Recent Labs   Lab Result Units 05/19/24 2059 05/20/24  0555 05/21/24  0550   WBC K/uL 15.13* 15.97* 13.20*   Hemoglobin g/dL 8.3* 8.7* 8.2*   Hematocrit % 26.2* 29.0* 27.7*   Platelets K/uL 413 394 307   Gran % % 88.3* 91.3* 91.0*   Lymph % % 8.5* 6.4* 5.0*   Mono % % 2.6* 1.4* 0.0*   Eosinophil % % 0.0 0.1 4.0   Basophil % % 0.3 0.2 0.0   Differential Method  Automated Automated Manual       Metabolic Panel (last 72 hours):  Recent Labs   Lab Result Units 05/19/24  2100 05/19/24  2102 05/20/24  0555 05/21/24  0550   Sodium mmol/L 138  --  136 135*   Potassium mmol/L 3.3*  --  3.5 4.1   Chloride mmol/L 99  --  100 103   CO2 mmol/L 26  --  20* 21*   Glucose mg/dL 200*  --  305* 136*   Glucose, UA   --  Negative  --   --    BUN mg/dL  16  --  16 16   Creatinine mg/dL 1.3  --  1.2 0.9   Albumin g/dL 1.9*  --  1.8* 1.6*   Total Bilirubin mg/dL 0.5  --  0.5 0.3   Alkaline Phosphatase U/L 71  --  67 61   AST U/L 50*  --  49* 89*   ALT U/L 48*  --  46* 60*   Magnesium mg/dL 1.8  --  1.8 2.1   Phosphorus mg/dL 2.5*  --  3.7 2.5*       Vancomycin Administrations:  vancomycin given in the last 96 hours                     vancomycin (VANCOCIN) 2,250 mg in dextrose 5 % (D5W) 500 mL IVPB (mg) 2,250 mg New Bag 05/21/24 2319    vancomycin (VANCOCIN) 2,250 mg in dextrose 5 % (D5W) 500 mL IVPB (mg) 2,250 mg New Bag 05/19/24 2154                    Microbiologic Results:  Microbiology Results (last 7 days)       Procedure Component Value Units Date/Time    Blood culture x two cultures. Draw prior to antibiotics. [[id number]] Collected: 05/19/24 2059    Order Status: Completed Specimen: Blood from Peripheral, Antecubital, Left Updated: 05/22/24 0612     Blood Culture, Routine No Growth to date      No Growth to date      No Growth to date    Narrative:      Aerobic and anaerobic    Urine culture [[id number]]  (Abnormal) Collected: 05/19/24 2102    Order Status: Completed Specimen: Urine Updated: 05/21/24 1430     Urine Culture, Routine YEAST   10,000 - 49,999 cfu/ml  Identification pending      Narrative:      Specimen Source->Urine    MRSA/SA Rapid ID by PCR from Blood culture [[id number]] Collected: 05/19/24 2101    Order Status: Completed Updated: 05/21/24 0602     Staph aureus ID by PCR Negative     Methicillin Resistant ID by PCR Negative    Narrative:      Aerobic and anaerobic    Blood culture x two cultures. Draw prior to antibiotics. [[id number]] Collected: 05/19/24 2100    Order Status: Completed Specimen: Blood Updated: 05/21/24 0452     Blood Culture, Routine Gram stain aer bottle:Gram positive cocci in clusters resembling Staph      Gram stain jumana bottle:Gram positive cocci in clusters resembling Staph      Results called to and read back by:Radha  Ru CALLE 05/21/2024 04:51    Narrative:      Aerobic and anaerobic    Influenza A & B by Molecular [[id number]] Collected: 05/19/24 2102    Order Status: Completed Specimen: Nasopharyngeal Swab Updated: 05/19/24 2307     Influenza A, Molecular Negative     Influenza B, Molecular Negative     Flu A & B Source Nasal swab

## 2024-05-23 VITALS
TEMPERATURE: 98 F | WEIGHT: 208.31 LBS | OXYGEN SATURATION: 100 % | RESPIRATION RATE: 18 BRPM | DIASTOLIC BLOOD PRESSURE: 68 MMHG | BODY MASS INDEX: 33.48 KG/M2 | HEIGHT: 66 IN | HEART RATE: 81 BPM | SYSTOLIC BLOOD PRESSURE: 125 MMHG

## 2024-05-23 LAB
BACTERIA BLD CULT: ABNORMAL
POCT GLUCOSE: 161 MG/DL (ref 70–110)
POCT GLUCOSE: 219 MG/DL (ref 70–110)
POCT GLUCOSE: 228 MG/DL (ref 70–110)

## 2024-05-23 PROCEDURE — 63600175 PHARM REV CODE 636 W HCPCS: Performed by: STUDENT IN AN ORGANIZED HEALTH CARE EDUCATION/TRAINING PROGRAM

## 2024-05-23 PROCEDURE — 25000003 PHARM REV CODE 250

## 2024-05-23 PROCEDURE — 11000001 HC ACUTE MED/SURG PRIVATE ROOM

## 2024-05-23 PROCEDURE — 25000003 PHARM REV CODE 250: Performed by: STUDENT IN AN ORGANIZED HEALTH CARE EDUCATION/TRAINING PROGRAM

## 2024-05-23 RX ORDER — ARGININE/GLUTAMINE/CALCIUM BMB 7G-7G-1.5G
1 POWDER IN PACKET (EA) ORAL 2 TIMES DAILY
Start: 2024-05-23 | End: 2024-06-22

## 2024-05-23 RX ORDER — ERGOCALCIFEROL 1.25 MG/1
50000 CAPSULE ORAL
Start: 2024-05-23 | End: 2024-07-26

## 2024-05-23 RX ORDER — OXYCODONE HYDROCHLORIDE 5 MG/1
5 TABLET ORAL EVERY 6 HOURS PRN
Qty: 24 TABLET | Refills: 0 | Status: SHIPPED | OUTPATIENT
Start: 2024-05-23 | End: 2024-05-29 | Stop reason: SDUPTHER

## 2024-05-23 RX ORDER — OXYCODONE HYDROCHLORIDE 5 MG/1
5 TABLET ORAL EVERY 6 HOURS PRN
Qty: 24 TABLET | Refills: 0 | Status: SHIPPED | OUTPATIENT
Start: 2024-05-23 | End: 2024-05-23

## 2024-05-23 RX ADMIN — OXYCODONE 5 MG: 5 TABLET ORAL at 09:05

## 2024-05-23 RX ADMIN — TAMSULOSIN HYDROCHLORIDE 0.4 MG: 0.4 CAPSULE ORAL at 09:05

## 2024-05-23 RX ADMIN — CEFEPIME 2 G: 2 INJECTION, POWDER, FOR SOLUTION INTRAVENOUS at 09:05

## 2024-05-23 RX ADMIN — APIXABAN 5 MG: 5 TABLET, FILM COATED ORAL at 08:05

## 2024-05-23 RX ADMIN — INSULIN DETEMIR 10 UNITS: 100 INJECTION, SOLUTION SUBCUTANEOUS at 09:05

## 2024-05-23 RX ADMIN — OXYCODONE 5 MG: 5 TABLET ORAL at 04:05

## 2024-05-23 RX ADMIN — MUPIROCIN: 20 OINTMENT TOPICAL at 08:05

## 2024-05-23 RX ADMIN — INSULIN ASPART 1 UNITS: 100 INJECTION, SOLUTION INTRAVENOUS; SUBCUTANEOUS at 09:05

## 2024-05-23 RX ADMIN — APIXABAN 5 MG: 5 TABLET, FILM COATED ORAL at 09:05

## 2024-05-23 NOTE — NURSING
"Nurse called monitor tech for rate. Pt noted with HR SB 39.,Then switched over to paced 80s. /72 HR 83. Pt observed resting, appears asleep, easy to arouse. Dr Clemons on unit floor, MD observed pt's rhythm on tele monitor. Informed of HR drop to 39, now paced, and BP. No orders given. MD stated "that's ok, his pacemaker is set."   "

## 2024-05-23 NOTE — NURSING
Called report to [facility] at [facility]. Pt ready and waiting on [facility] as pt was unable to get into .

## 2024-05-23 NOTE — PLAN OF CARE
[facility]    FACILITY TRANSFER ORDERS      Patient Name: Saji Castañeda  YOB: 1950    PCP: [facility] -   PCP Address: [address] / [place] [address]  PCP Phone Number: [phone]  PCP Fax: [phone]    Encounter Date: 05/23/2024    Admit to: [facility]    Vital Signs:  Routine    Diagnoses:   Active Hospital Problems    Diagnosis  POA    *Sepsis secondary to UTI [A41.9, N39.0]  Yes    Altered mental status [R41.82]  Unknown    Palliative care encounter [Z51.5]  Not Applicable    BPH (benign prostatic hyperplasia) [N40.0]  Yes    History of amputation of left high mid foot  [Z89.432]  Not Applicable    Long term current use of insulin [Z79.4]  Not Applicable     Noted by [facility]  last documented on 20240110      Chronic anticoagulation [Z79.01]  Not Applicable    Other hyperlipidemia [E78.49]  Yes    Stage 3b chronic kidney disease [N18.32]  Yes    Blurred vision, left eye [H53.8]  Yes     Chronic    Paroxysmal atrial fibrillation [I48.0]  Yes     Chronic    History of complete heart block [Z86.79]  Not Applicable     Chronic    Sepsis [A41.9]  Yes    Osteomyelitis of right lower extremity [M86.9]  Yes    Hypergranulation [L92.9]  Yes    Nonhealing ulcer of right lower leg with fat layer exposed [L97.912]  Yes    Chronic deep vein thrombosis (DVT) of right upper extremity [I82.721]  Yes     Chronic    Leg swelling [M79.89]  Yes    Morbid obesity [E66.01]  Yes    PVD (peripheral vascular disease) [I73.9]  Yes    Peripheral arterial disease [I73.9]  Yes     - long standing wounds with skin graft to left TMA in [id number]; LE angio in 2014 with patent left CFA, SFA, PFA with 3V run off on LE angiogram by Vascular surgery  - CTA LE with run off 6/2022 with moderate to high grade disease of right popliteal with severely diseased AT, PT, peroneal and multiple occlusion of left popliteal with occluded PT and peroneal and short segment occlusion of  AT- patient denies any previous intervention  - BLE arterial ultrasound 3/2023 with similar findings suggestive of bilateral popliteal and BTK disease; seen by Vascular surgery at [facility] with recommendation for amputation- patient declined  - transferred to [facility] 05/2023 for evaluation for revascularization; extensive disease and multiple co morbidities along with debility/bed bound status and concern for non compliance, deemed not a  Revascularization candidate and placed on statin and Plavix; no ASA given risk of bleeding with triple therapy        Asymptomatic Mobitz (type) I (Wenckebach's) atrioventricular block [I44.1]  Yes    Anemia of chronic disease [D63.8]  Yes    Cataract cortical, senile [H25.019]  Yes    Insulin dependent type 2 diabetes mellitus [E11.9, Z79.4]  Not Applicable     Chronic    Type 2 diabetes mellitus with diabetic neuropathy, unspecified [E11.40]  Yes    Essential hypertension [I10]  Yes     Chronic    Wound, open, foot with complication [S91.309A]  Yes    Tinea pedis [B35.3]  Yes      Resolved Hospital Problems    Diagnosis Date Resolved POA    Severe sepsis [A41.9, R65.20] 05/19/2024 Yes    Iron deficiency anemia [D50.9] 05/19/2024 Yes    Cellulitis of left lower leg [L03.116] 05/19/2024 Yes       Allergies:Review of patient's allergies indicates:  No Known Allergies    Diet: diabetic diet: 2000 calorie    Activities: Activity as tolerated    Goals of Care Treatment Preferences:  Code Status: Full Code    Health care agent: [facility] agent number: [phone]                   Nursing: None     Labs: CBC and CMP  weekly      CONSULTS:    Physical Therapy to evaluate and treat.  and Occupational Therapy to evaluate and treat.    MISCELLANEOUS CARE:  Routine Skin for Bedridden Patients: Apply moisture barrier cream to all skin folds and wet areas in perineal area daily and after baths and all bowel movements. and Diabetes Care:   Report CBG < 60 or > 350 to  "physician.    WOUND CARE ORDERS  Right heel: tegaderm, gauze, roll gauze.   Sacrum and perineum: Triad BID covered with ABD pads and taped secure     Medications: Review discharge medications with patient and family and provide education.         Medication List        START taking these medications      ergocalciferol 50,000 unit Cap  Commonly known as: ERGOCALCIFEROL  Take 1 capsule (50,000 Units total) by mouth every 7 days. for 10 doses     HARITHA 7-7-1.5 gram Pwpk  Generic drug: arginine-glutamine-calcium HMB  Take 1 packet by mouth 2 (two) times a day.     oxyCODONE 5 MG immediate release tablet  Commonly known as: ROXICODONE  Take 1 tablet (5 mg total) by mouth every 6 (six) hours as needed for Pain.            CHANGE how you take these medications      isosorbide dinitrate 10 MG tablet  Commonly known as: ISORDIL  Take 1 tablet (10 mg total) by mouth 3 (three) times daily. Hold until follow up with a provider  What changed: additional instructions            CONTINUE taking these medications      acetaminophen 325 MG tablet  Commonly known as: TYLENOL     acidophilus-pectin, citrus 100 million cell-10 mg Cap     apixaban 5 mg Tab  Commonly known as: ELIQUIS  Take 1 tablet (5 mg total) by mouth 2 (two) times daily.     ascorbic acid (vitamin C) 500 MG tablet  Commonly known as: VITAMIN C     B COMPLEX-VITAMIN B12 tablet  Generic drug: b complex vitamins     BD AUTOSHIELD DUO PEN NEEDLE 30 gauge x 3/16" Ndle  Generic drug: pen needle,diabetic dual safty     BD ULTRA-FINE ADITYA PEN NEEDLE 32 gauge x 5/32" Ndle  Generic drug: pen needle, diabetic  USE FOUR TIMES DAILY WITH MEALS AND NIGHTLY     blood sugar diagnostic Strp  Commonly known as: CONTOUR TEST STRIPS  1 strip by Misc.(Non-Drug; Combo Route) route 4 (four) times daily. Use to check blood glucose 4x daily     ferrous sulfate 325 (65 FE) MG EC tablet     FLOMAX 0.4 mg Cap  Generic drug: tamsulosin     furosemide 20 MG tablet  Commonly known as: LASIX  Take " 1 tablet (20 mg total) by mouth once daily.     gabapentin 300 MG capsule  Commonly known as: NEURONTIN     * insulin aspart U-100 100 unit/mL injection  Commonly known as: NovoLOG     * insulin aspart U-100 100 unit/mL (3 mL) Inpn pen  Commonly known as: NovoLOG  Inject 5 Units into the skin 3 (three) times daily with meals.     lancets Misc  Commonly known as: MICROLET LANCET  1 each by Misc.(Non-Drug; Combo Route) route 4 (four) times daily. Use to check blood sugars 4x daily     LEVEMIR FLEXPEN 100 unit/mL (3 mL) Inpn pen  Generic drug: insulin detemir U-100 (Levemir)  Inject 12 Units into the skin 2 (two) times daily.     multivitamin per tablet  Commonly known as: THERAGRAN     NIFEdipine 90 MG (OSM) 24 hr tablet  Commonly known as: PROCARDIA-XL  Take 1 tablet (90 mg total) by mouth once daily.     pantoprazole 40 MG tablet  Commonly known as: PROTONIX           * This list has 2 medication(s) that are the same as other medications prescribed for you. Read the directions carefully, and ask your doctor or other care provider to review them with you.                STOP taking these medications      cholecalciferol (vitamin D3) 125 mcg (5,000 unit) Tab     HYDROcodone-acetaminophen 5-325 mg per tablet  Commonly known as: NORCO     rosuvastatin 40 MG Tab  Commonly known as: CRESTOR     sulfamethoxazole-trimethoprim 800-160mg 800-160 mg Tab  Commonly known as: BACTRIM DS               Where to Get Your Medications        These medications were sent to [id number] - [place]  [facility] [place] [address] [facility] [place] [address] [place] [address]      Phone: [phone]   oxyCODONE 5 MG immediate release tablet                Immunizations Administered as of 5/23/2024       Name Date Dose VIS Date Route Exp Date    COVID-19, MRNA, LN-S, PF (Moderna) 7/13/2021 0.5 mL -- -- --    Lot: [id number]     COVID-19, MRNA, LN-S, PF (Moderna) 7/13/2021 0.5 mL -- Intramuscular  --    Site: Left arm     : Moderna [employer]     Lot: [id number]     Comment: Adminis     COVID-19, MRNA, LN-S, PF (Moderna) 5/24/2021 0.5 mL -- -- --    Lot: [id number]     COVID-19, MRNA, LN-S, PF (Moderna) 5/24/2021 0.5 mL -- -- --    : Moderna [employer]     Lot: [id number]     Comment: Adminis             This patient has had both covid vaccinations    Some patients may experience side effects after vaccination.  These may include fever, headache, muscle or joint aches.  Most symptoms resolve with 24-48 hours and do not require urgent medical evaluation unless they persist for more than 72 hours or symptoms are concerning for an unrelated medical condition.          _________________________________  Belkys Bustillo MD  05/23/2024

## 2024-05-23 NOTE — MEDICAL/APP STUDENT
[facility] Hospital Medicine Discharge Summary    Primary Team: [facility] Hospitalist Team B  Attending Physician: Kumar Valdes MD  Resident: Dr. Sky Ty  Intern: Dr. Belkys Bustillo    Date of Admit: 5/19/2024  Date of Discharge: 5/23/2024    Discharge to: [facility]  Condition: Stable    Discharge Diagnoses     Patient Active Problem List   Diagnosis    Tinea pedis    Wound, open, foot with complication    Type 2 diabetes mellitus with diabetic neuropathy, unspecified    Essential hypertension    Insulin dependent type 2 diabetes mellitus    Cataract cortical, senile    Anemia of chronic disease    Asymptomatic Mobitz (type) I (Wenckebach's) atrioventricular block    Peripheral arterial disease    PVD (peripheral vascular disease)    Morbid obesity    Leg swelling    Chronic deep vein thrombosis (DVT) of right upper extremity    Nonhealing ulcer of right lower leg with fat layer exposed    Hypergranulation    Osteomyelitis of right lower extremity    Paroxysmal atrial fibrillation    History of complete heart block    Sepsis    Blurred vision, left eye    Other hyperlipidemia    Stage 3b chronic kidney disease    History of amputation of left high mid foot     Long term current use of insulin    Chronic anticoagulation    BPH (benign prostatic hyperplasia)    Sepsis secondary to UTI    Palliative care encounter    Altered mental status       Consultants and Procedures     Consultants:  Infectious disease  Palliative medicine  Nutrition  Med/Surg    Procedures:   N/A    Brief History of Present Illness      Saji Castañeda is a 74 y.o. 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     The  patient was in their usual state of health until the morning of admission when the nursing staff at [facility] noted the patient to be acutely encephalopathic associated with fever and sacral pain. In ED, patient was tachycardic and febrile to 102.3. Purulent drainage was noted to first digit on right foot, as well as unstagable sacral ulcers and chronic fung with cloudy UOP. Pt received 2L NS, tylenol 650 suppository. Blood and urine cultures were obtained before starting patient on vanc, cefepime, and flagyl. Patient was recently admitted and discharged for proximally week ago for sepsis secondary to UTI from chronic indwelling Fung.  He was subsequently discharged with Bactrim unsure if he completed the 10 day course.      For the full HPI please refer to the History & Physical from this admission.    Hospital Course By Problem with Pertinent Findings     Sepsis likely 2/2 infected pressure wounds vs UTI  Unstageable Sacral Pressure Ulcer x2  Unstageable Right Heel Pressure Ulcer  Right Hallux Infection  Chronic indwelling fung  On admission, patient febrile with malodorous pressure ulcers on physical exam and leukocytosis on labs. Fung changed in ED, noted to of had potential purulence (documented as cloudy UOP). On discharge, patient afebrile and leukocytosis has resolved.   - Bcxs and Ucx with contaminants  - Ucx growing < 100 k yeast; decided against therapy at this time  - ID consulted and recommend pulling vanc / metro and monitoring for continued clinical improvement. Cefepime also discontinued.  - Palliative consulted, GOC remain to pursue all medical avenues for treatment and use all life preserving measures if indicated.  Plan:  - WC consulted and recommendations include:  - Right heel : tegaderm, gauze, roll gauze.   - Sacrum and perineum - Triad BID covered with ABD pads and taped secure.      Transaminitis   - Home statin held during stay and hepatotoxic medications avoided  - Transaminitis  "improved on discharge     YNES on CKD3 - resolved  - On admission Cr/eGFR 1.3/57, baseline 0.9-1.0/>60.  S/p 2L NS on admission.   - YNES resolved with BUN/Cr of 13/ 0.8 on admission.       AGMA - resolved  Co2: 20, A; LA: 1.0, mild ketones in urine   - S/p 2 L in ED  - On discharge, AGMA improved with CO2 of 30 and AG of 4.     Elevated Troponin - resolved  # Suspected Type-II MI  - resolved w no symptoms during hospitalization     T2DM on long-term insulin  Last A1c >14 2 months prior to admission.  Home Medications: Levemir 9 u BID; Novalog TID  - During hospital stay started on Detemir 10 u daily w SSI  - Glucose range during stay 160-232. POCT Glucose 161 on discharge.  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  - Follow up with PCP     BPH s/p Chronic Fung  Fung changed in ED, noted to of had potential purulence (documented as cloudy UOP).  - Ucx's w yeast contaminant  - Continue fung care.  - Continue home flomax 0.4 mg daily     Chronic Multifactorial Anemia  On admission, Hgb stable at baseline; 8.3 and 7-8 respectively.  - Hb 7.6 on discharge     Mild Hypokalemia  Was given potassium chloride and potassium phosphate to replace potassium throughout stay.   - Potassium 3.5 on discharge     Paroxysmal A-fib on eliquis  Currently paced at the AV Node  - continue home eliquis 5 mg BID     Multifactorial HTN  Home medications: nifedipine 90 mg, Bidil 10 mg TID  - can resume home anti-hypertensive medications as needed     HLD  - holding home statin due to transaminitis  Plan:  - Resume home statin on discharge      Code Status: FULL    Discharge Vitals:   BP (!) 125/57   Pulse 84   Temp 98.1 °F (36.7 °C)   Resp 18   Ht 5' 6" (1.676 m)   Wt 94.5 kg (208 lb 5.4 oz)   SpO2 100%   BMI 33.63 kg/m²      Discharge Medications        Medication List        START taking these " "medications      ergocalciferol 50,000 unit Cap  Commonly known as: ERGOCALCIFEROL  Take 1 capsule (50,000 Units total) by mouth every 7 days. for 10 doses     HARITHA 7-7-1.5 gram Pwpk  Generic drug: arginine-glutamine-calcium HMB  Take 1 packet by mouth 2 (two) times a day.     oxyCODONE 5 MG immediate release tablet  Commonly known as: ROXICODONE  Take 1 tablet (5 mg total) by mouth every 6 (six) hours as needed for Pain.            CHANGE how you take these medications      isosorbide dinitrate 10 MG tablet  Commonly known as: ISORDIL  Take 1 tablet (10 mg total) by mouth 3 (three) times daily. Hold until follow up with a provider  What changed: additional instructions            CONTINUE taking these medications      acetaminophen 325 MG tablet  Commonly known as: TYLENOL     acidophilus-pectin, citrus 100 million cell-10 mg Cap     apixaban 5 mg Tab  Commonly known as: ELIQUIS  Take 1 tablet (5 mg total) by mouth 2 (two) times daily.     ascorbic acid (vitamin C) 500 MG tablet  Commonly known as: VITAMIN C     B COMPLEX-VITAMIN B12 tablet  Generic drug: b complex vitamins     BD AUTOSHIELD DUO PEN NEEDLE 30 gauge x 3/16" Ndle  Generic drug: pen needle,diabetic dual safty     BD ULTRA-FINE ADITYA PEN NEEDLE 32 gauge x 5/32" Ndle  Generic drug: pen needle, diabetic  USE FOUR TIMES DAILY WITH MEALS AND NIGHTLY     blood sugar diagnostic Strp  Commonly known as: CONTOUR TEST STRIPS  1 strip by Misc.(Non-Drug; Combo Route) route 4 (four) times daily. Use to check blood glucose 4x daily     ferrous sulfate 325 (65 FE) MG EC tablet     FLOMAX 0.4 mg Cap  Generic drug: tamsulosin     furosemide 20 MG tablet  Commonly known as: LASIX  Take 1 tablet (20 mg total) by mouth once daily.     gabapentin 300 MG capsule  Commonly known as: NEURONTIN     * insulin aspart U-100 100 unit/mL injection  Commonly known as: NovoLOG     * insulin aspart U-100 100 unit/mL (3 mL) Inpn pen  Commonly known as: NovoLOG  Inject 5 Units into the " skin 3 (three) times daily with meals.     lancets Misc  Commonly known as: MICROLET LANCET  1 each by Misc.(Non-Drug; Combo Route) route 4 (four) times daily. Use to check blood sugars 4x daily     LEVEMIR FLEXPEN 100 unit/mL (3 mL) Inpn pen  Generic drug: insulin detemir U-100 (Levemir)  Inject 12 Units into the skin 2 (two) times daily.     multivitamin per tablet  Commonly known as: THERAGRAN     NIFEdipine 90 MG (OSM) 24 hr tablet  Commonly known as: PROCARDIA-XL  Take 1 tablet (90 mg total) by mouth once daily.     pantoprazole 40 MG tablet  Commonly known as: PROTONIX           * This list has 2 medication(s) that are the same as other medications prescribed for you. Read the directions carefully, and ask your doctor or other care provider to review them with you.                STOP taking these medications      cholecalciferol (vitamin D3) 125 mcg (5,000 unit) Tab     HYDROcodone-acetaminophen 5-325 mg per tablet  Commonly known as: NORCO     rosuvastatin 40 MG Tab  Commonly known as: CRESTOR     sulfamethoxazole-trimethoprim 800-160mg 800-160 mg Tab  Commonly known as: BACTRIM DS               Where to Get Your Medications        These medications were sent to [id number] - [place], [place]  [facility] [place] [address] AT [facility] [place] [address]  [facility] [place] [address]      Phone: [phone]   oxyCODONE 5 MG immediate release tablet       Information about where to get these medications is not yet available    Ask your nurse or doctor about these medications  ergocalciferol 50,000 unit Cap  HARITHA 7-7-1.5 gram Pwpk          Discharge Information:   Diet:  Diabetic/Cardiac  A diabetic/cardiac diet focuses on managing blood sugar levels and promoting heart health, essential for patients with diabetes and heart conditions. This diet emphasizes balanced meals with controlled portions of carbohydrates, lean proteins, and healthy fats. Patients are encouraged to eat whole  grains, fresh fruits and vegetables, and low-fat dairy products while avoiding sugary foods, refined carbs, and trans fats. Monitoring sodium intake is also crucial to manage blood pressure and reduce the risk of cardiovascular complications. Consistent meal timing and portion control are key to maintaining stable blood glucose levels.    Physical Activity:  As tolerated             Instructions:  1. Take all medications as prescribed  2. Keep all follow-up appointments  3. Return to the hospital or call your primary care physicians if any worsening symptoms such as fever, chest pain, shortness of breath, return of symptoms, or any other concerns.    Follow-Up Appointments:  Urology: 06/04/2024      Emily Mcguire MS3

## 2024-05-23 NOTE — PROGRESS NOTES
"\[facility]\"" Hospital Medicine Progress Note    Primary Team: \[facility]\"" Hospitalist Team B  Attending Physician: Kumar Valdes MD  Resident: Melony  Intern: Iwona    Subjective:      NAEO. AFVSS. ID recommending gradual removal of abx and continued monitoring in setting of Ucx and Bcx contaminants. Patient with no complaints today.    Wound care recs:  Right heel : tegaderm, gauze, roll gauze.   - Sacrum and perineum - Triad BID covered with ABD pads and taped secure.      Objective:     Last 24 Hour Vital Signs:  BP  Min: 111/62  Max: 138/61  Temp  Av °F (36.7 °C)  Min: 97.5 °F (36.4 °C)  Max: 98.5 °F (36.9 °C)  Pulse  Av.3  Min: 44  Max: 96  Resp  Av  Min: 18  Max: 18  SpO2  Av.7 %  Min: 96 %  Max: 100 %  Height  Av' 6" (167.6 cm)  Min: 5' 6" (167.6 cm)  Max: 5' 6" (167.6 cm)  Weight  Av.5 kg (208 lb 5.4 oz)  Min: 94.5 kg (208 lb 5.4 oz)  Max: 94.5 kg (208 lb 5.4 oz)  I/O last 3 completed shifts:  In: 1824.1 [P.O.:240; IV Piggyback:1584.1]  Out: 3800 [Urine:3800]    Physical Examination:  General: A&Ox3, NAD  Head: Normocephalic, atraumatic, without obvious abnormality.  Eyes: Conjunctivae/corneas clear, anicteric sclera.  Mouth: OP without erythema nor exudates, MMM.  Poor dentition.  Lungs: Non-labored breathing, Symmetric chest rise, CTAB.  Heart: RRR, No murmurs, rub, nor click.  Abdomen: Soft, NT, ND, no obvious masses and/or organomegaly.  No peritoneal signs.  : No suprapubic tenderness nor distention. Sams in place.  Extremities: Left AKA with incision site without signs of infection. Right heel wrapped in guaze c/d/i.   Vascular: No obvious cyanosis, pallor, nor edema.   Skin: Multiple unstageable ulcers  Neurologic: No gross focal neurological deficits nor gross muscle weakness.  Psychiatric: Appropriate affect.    Laboratory:  Laboratory Data Reviewed: yes  Pertinent Findings:  No new      Microbiology Data Reviewed: yes  Pertinent Findings:  Bcx's : 1/2 growing Coag neg " staph, likely contaminant  Ucx 5/19: 10,000 - 50,000 yeast  Influenza A&B: Negative    Other Results:  EKG (my interpretation): Mobitz Type II heart block, no changes from previous ekg    Radiology Data Reviewed: yes  Pertinent Findings:  None    Current Medications:     Infusions:       Scheduled:   apixaban  5 mg Oral BID    ceFEPime IV (PEDS and ADULTS)  2 g Intravenous Q12H    insulin detemir U-100  10 Units Subcutaneous Daily    mupirocin   Nasal BID    polyethylene glycol  17 g Oral Daily    tamsulosin  0.4 mg Oral Daily        PRN:    Current Facility-Administered Medications:     dextrose 10%, 12.5 g, Intravenous, PRN    dextrose 10%, 25 g, Intravenous, PRN    glucagon (human recombinant), 1 mg, Intramuscular, PRN    glucose, 16 g, Oral, PRN    glucose, 24 g, Oral, PRN    insulin aspart U-100, 0-5 Units, Subcutaneous, QID (AC + HS) PRN    naloxone, 0.02 mg, Intravenous, PRN    oxyCODONE, 5 mg, Oral, Q6H PRN    sodium chloride 0.9%, 10 mL, Intravenous, Q12H PRN    Antibiotics and Day Number of Therapy:  Vancomycin 5/19 - 5/22  Cefepime 5/19 - present  Flagyl 5/19 - 5/22    Lines and Day Number of Therapy:  None    Assessment:     Saji Castañeda is a 74 y.o. male with medical comorbidities of T2DM on long-term insulin, PVD with diabetic ulcer s/p left AKA (3/27/2024), Chronic Multifactorial Anemia, BPH s/p Chronic Fung, paroxysmal A-fib on eliquis, CHB s/p PPM (10/12/2023), CKD3, Multifactorial HTN, and HLD who presented to [facility] on 5/19/2024 with a primary complaint of Acute Encephalopathy for 1 day.  Initial evaluation pertinent for multiple pressure ulcers, and placed on broad coverage Abx. We are currently pulling abx and monitoring for continued signs of infection given negative cultures.     Plan:     Sepsis likely 2/2 infected pressure wounds vs UTI  Unstageable Sacral Pressure Ulcer x2  Unstageable Right Heel Pressure Ulcer  Right Hallux Infection  Chronic indwelling fung  On  admission, patient febrile with malodorous pressure ulcers on physical exam and leukocytosis on labs.  Fung changed in ED, noted to of had potential purulence (documented as cloudy UOP).  - Bcxs and Ucx with contaminants  - Ucx growing < 100 k yeast; holding off on therapy at this time  - continue cefepime  - ID consulted and recommend pulling vanc / metro and monitoring for continued clinical improvement  - Palliative consulted, GOC remain to pursue all medical avenues for treatment and use all life preserving measures if indicated.  - WC consulted, appreciate assistance.     Transaminitis - improved  - holding home statin  - avoiding hepatotoxic medications  - CTM    YNES on CKD3 - resolved  On admission Cr/eGFR 1.3/57, baseline 0.9-1.0/>60.  S/p 2L NS on admission.  - CTM    AGMA - resolved  Co2: 20, A; LA: 1.0, mild ketones in urine   - S/p 2 L in ED  - CTM     Elevated Troponin - resolved  # Suspected Type-II MI  - resolved w no symptoms during hospitalization     T2DM on long-term insulin  Last A1c >14 2 months prior to admission.  Home Medications: Levemir 9 u BID; Novalog TID  - Continue Detemir 10 u daily w SSI     PVD with diabetic ulcer s/p left AKA (3/27/2024)  Likely has ASCVD on Right side also, will make adequate perfusion for healing difficult.  - CTM     BPH s/p Chronic Fung  Fung changed in ED, noted to of had potential purulence (documented as cloudy UOP).  - Ucx's w yeast contaminant  - Continue fung care.  - Continue home flomax 0.4 mg daily     Chronic Multifactorial Anemia  On admission, Hgb stable at baseline; 8.3 and 7-8 respectively.  - CTM     Mild Hypokalemia  - replete prn     Paroxysmal A-fib on eliquis  Currently paced at the AV Node  - continue home eliquis 5 mg BID  - CTM     CHB s/p PPM (10/12/2023)  - CTM     Multifactorial HTN  Home medications: nifedipine 90 mg, Bidil 10 mg TID  - can resume home anti-hypertensive medications as needed     HLD  - holding home statin due  to transaminitis     Diet: Diabetic/Cardiac  Telemetry: no  VTE Ppx: Home Eliquis  Code Status: FULL     Dispo: Continue to monitor while narrowing abx    Belkys Bustillo MD  [facility] Internal [facility] [id number]    [facility] Medicine Hospitalist Pager numbers:   [facility] Hospitalist Medicine Team A (Lorne/Riki): [phone]  [facility] Hospitalist Medicine Team B (Arik/Lucio):  [phone]

## 2024-05-23 NOTE — NURSING
Pt. Being discharged back to [facility] per MD orders. VSS, pt afebrile and no c/o pain at this time. Reviewed discharge instruction, follow-up instructions, and med with Summer from NH. Removed PIV access from RFA. Dry gauze/ tape placed to site, CDI. Ride to be by [facility] upon discharge and will transport via stretcher.

## 2024-05-23 NOTE — DISCHARGE SUMMARY
\[facility]\"" Hospital Medicine Discharge Summary     Primary Team: \[facility]\"" Hospitalist Team B  Attending Physician: Kumar Valdes MD  Resident: Dr. Sky Ty  Intern: Dr. Belkys Bustillo     Date of Admit: 5/19/2024  Date of Discharge: 5/23/2024     Discharge to: [facility]  Condition: Stable     Discharge Diagnoses      Problem List       Patient Active Problem List   Diagnosis    Tinea pedis    Wound, open, foot with complication    Type 2 diabetes mellitus with diabetic neuropathy, unspecified    Essential hypertension    Insulin dependent type 2 diabetes mellitus    Cataract cortical, senile    Anemia of chronic disease    Asymptomatic Mobitz (type) I (Wenckebach's) atrioventricular block    Peripheral arterial disease    PVD (peripheral vascular disease)    Morbid obesity    Leg swelling    Chronic deep vein thrombosis (DVT) of right upper extremity    Nonhealing ulcer of right lower leg with fat layer exposed    Hypergranulation    Osteomyelitis of right lower extremity    Paroxysmal atrial fibrillation    History of complete heart block    Sepsis    Blurred vision, left eye    Other hyperlipidemia    Stage 3b chronic kidney disease    History of amputation of left high mid foot     Long term current use of insulin    Chronic anticoagulation    BPH (benign prostatic hyperplasia)    Sepsis secondary to UTI    Palliative care encounter    Altered mental status            Consultants and Procedures      Consultants:  Infectious disease  Palliative medicine  Nutrition  Med/Surg     Procedures:   N/A     Brief History of Present Illness      Saji Castañeda is a 74 y.o. male with medical comorbidities of T2DM on long-term insulin, PVD with diabetic ulcer s/p left AKA (3/27/2024), Chronic Multifactorial Anemia, BPH s/p Chronic Sams, paroxysmal A-fib on eliquis, CHB s/p PPM (10/12/2023), CKD3, Multifactorial HTN, and HLD who presented to [facility] on 5/19/2024 with a primary complaint of Acute  Encephalopathy for 1 day.     The patient was in their usual state of health until the morning of admission when the nursing staff at [facility] noted the patient to be acutely encephalopathic associated with fever and sacral pain. In ED, patient was tachycardic and febrile to 102.3. Purulent drainage was noted to first digit on right foot, as well as unstagable sacral ulcers and chronic fung with cloudy UOP. Pt received 2L NS, tylenol 650 suppository. Blood and urine cultures were obtained before starting patient on vanc, cefepime, and flagyl. Patient was recently admitted and discharged for proximally week ago for sepsis secondary to UTI from chronic indwelling Fung.  He was subsequently discharged with Bactrim unsure if he completed the 10 day course.       For the full HPI please refer to the History & Physical from this admission.     Hospital Course By Problem with Pertinent Findings      Sepsis likely 2/2 infected pressure wounds vs UTI  Unstageable Sacral Pressure Ulcer x2  Unstageable Right Heel Pressure Ulcer  Right Hallux Infection  Chronic indwelling fung  On admission, patient febrile with malodorous pressure ulcers on physical exam and leukocytosis on labs. Fung changed in ED, noted to of had potential purulence (documented as cloudy UOP). On discharge, patient afebrile and leukocytosis has resolved.   - Bcxs and Ucx with contaminants  - Ucx growing < 100 k yeast; decided against therapy at this time  - ID consulted and recommend pulling vanc / metro and monitoring for continued clinical improvement. Cefepime also discontinued.  - Palliative consulted, GOC remain to pursue all medical avenues for treatment and use all life preserving measures if indicated.  Plan:  - WC consulted and recommendations include:  - Right heel : tegaderm, gauze, roll gauze.   - Sacrum and perineum - Triad BID covered with ABD pads and taped secure. Recommendations relayed to nursing facility      Transaminitis   - Home  "statin held during stay and hepatotoxic medications avoided  - Transaminitis improved on discharge     YNES on CKD3 - resolved  - On admission Cr/eGFR 1.3/57, baseline 0.9-1.0/>60.  S/p 2L NS on admission.   - YNES resolved with BUN/Cr of 13/ 0.8 on admission.        AGMA - resolved  Co2: 20, A; LA: 1.0, mild ketones in urine   - S/p 2 L in ED  - On discharge, AGMA improved with CO2 of 30 and AG of 4.     Elevated Troponin - resolved  # Suspected Type-II MI  - resolved w no symptoms during hospitalization     T2DM on long-term insulin  Last A1c >14 2 months prior to admission.  Home Medications: Levemir 9 u BID; Novalog TID  - During hospital stay started on Detemir 10 u daily w SSI  - Glucose range during stay 160-232. POCT Glucose 161 on discharge.  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  - Follow up with PCP     BPH s/p Chronic Fung  Fung changed in ED, noted to of had potential purulence (documented as cloudy UOP).  - Ucx's w yeast contaminant  - Continue fung care.  - Continue home flomax 0.4 mg daily     Chronic Multifactorial Anemia  On admission, Hgb stable at baseline; 8.3 and 7-8 respectively.  - Hb 7.6 on discharge     Mild Hypokalemia  Was given potassium chloride and potassium phosphate to replace potassium throughout stay.   - Potassium 3.5 on discharge     Paroxysmal A-fib on eliquis  Currently paced at the AV Node  - continue home eliquis 5 mg BID     Multifactorial HTN  Home medications: nifedipine 90 mg, Bidil 10 mg TID  - can resume home anti-hypertensive medications as needed     HLD  - holding home statin due to transaminitis  Plan:  - Resume home statin on discharge        Code Status: FULL     Discharge Vitals:   BP (!) 125/57   Pulse 84   Temp 98.1 °F (36.7 °C)   Resp 18   Ht 5' 6" (1.676 m)   Wt 94.5 kg (208 lb 5.4 oz)   SpO2 100%   BMI 33.63 " "kg/m²       Discharge Medications          Medication List          START taking these medications       ergocalciferol 50,000 unit Cap  Commonly known as: ERGOCALCIFEROL  Take 1 capsule (50,000 Units total) by mouth every 7 days. for 10 doses      HARITHA 7-7-1.5 gram Pwpk  Generic drug: arginine-glutamine-calcium HMB  Take 1 packet by mouth 2 (two) times a day.      oxyCODONE 5 MG immediate release tablet  Commonly known as: ROXICODONE  Take 1 tablet (5 mg total) by mouth every 6 (six) hours as needed for Pain.                CHANGE how you take these medications       isosorbide dinitrate 10 MG tablet  Commonly known as: ISORDIL  Take 1 tablet (10 mg total) by mouth 3 (three) times daily. Hold until follow up with a provider  What changed: additional instructions                CONTINUE taking these medications       acetaminophen 325 MG tablet  Commonly known as: TYLENOL      acidophilus-pectin, citrus 100 million cell-10 mg Cap      apixaban 5 mg Tab  Commonly known as: ELIQUIS  Take 1 tablet (5 mg total) by mouth 2 (two) times daily.      ascorbic acid (vitamin C) 500 MG tablet  Commonly known as: VITAMIN C      B COMPLEX-VITAMIN B12 tablet  Generic drug: b complex vitamins      BD AUTOSHIELD DUO PEN NEEDLE 30 gauge x 3/16" Ndle  Generic drug: pen needle,diabetic dual safty      BD ULTRA-FINE ADITYA PEN NEEDLE 32 gauge x 5/32" Ndle  Generic drug: pen needle, diabetic  USE FOUR TIMES DAILY WITH MEALS AND NIGHTLY      blood sugar diagnostic Strp  Commonly known as: CONTOUR TEST STRIPS  1 strip by Misc.(Non-Drug; Combo Route) route 4 (four) times daily. Use to check blood glucose 4x daily      ferrous sulfate 325 (65 FE) MG EC tablet      FLOMAX 0.4 mg Cap  Generic drug: tamsulosin      furosemide 20 MG tablet  Commonly known as: LASIX  Take 1 tablet (20 mg total) by mouth once daily.      gabapentin 300 MG capsule  Commonly known as: NEURONTIN      * insulin aspart U-100 100 unit/mL injection  Commonly known as: " NovoLOG      * insulin aspart U-100 100 unit/mL (3 mL) Inpn pen  Commonly known as: NovoLOG  Inject 5 Units into the skin 3 (three) times daily with meals.      lancets Misc  Commonly known as: MICROLET LANCET  1 each by Misc.(Non-Drug; Combo Route) route 4 (four) times daily. Use to check blood sugars 4x daily      LEVEMIR FLEXPEN 100 unit/mL (3 mL) Inpn pen  Generic drug: insulin detemir U-100 (Levemir)  Inject 12 Units into the skin 2 (two) times daily.      multivitamin per tablet  Commonly known as: THERAGRAN      NIFEdipine 90 MG (OSM) 24 hr tablet  Commonly known as: PROCARDIA-XL  Take 1 tablet (90 mg total) by mouth once daily.      pantoprazole 40 MG tablet  Commonly known as: PROTONIX              * This list has 2 medication(s) that are the same as other medications prescribed for you. Read the directions carefully, and ask your doctor or other care provider to review them with you.                    STOP taking these medications       cholecalciferol (vitamin D3) 125 mcg (5,000 unit) Tab      HYDROcodone-acetaminophen 5-325 mg per tablet  Commonly known as: NORCO      rosuvastatin 40 MG Tab  Commonly known as: CRESTOR      sulfamethoxazole-trimethoprim 800-160mg 800-160 mg Tab  Commonly known as: BACTRIM DS                    Where to Get Your Medications          These medications were sent to [id number] - [place], [place] - [address] AT [facility] [place] [address] [place] [address]        Phone: [phone]   oxyCODONE 5 MG immediate release tablet         Information about where to get these medications is not yet available    Ask your nurse or doctor about these medications  ergocalciferol 50,000 unit Cap  HARITHA 7-7-1.5 gram Pwpk            Discharge Information:   Diet:  Diabetic/Cardiac  A diabetic/cardiac diet focuses on managing blood sugar levels and promoting heart health, essential for patients with diabetes and heart conditions. This  diet emphasizes balanced meals with controlled portions of carbohydrates, lean proteins, and healthy fats. Patients are encouraged to eat whole grains, fresh fruits and vegetables, and low-fat dairy products while avoiding sugary foods, refined carbs, and trans fats. Monitoring sodium intake is also crucial to manage blood pressure and reduce the risk of cardiovascular complications. Consistent meal timing and portion control are key to maintaining stable blood glucose levels.     Physical Activity:  As tolerated             Instructions:  1. Take all medications as prescribed  2. Keep all follow-up appointments  3. Return to the hospital or call your primary care physicians if any worsening symptoms such as fever, chest pain, shortness of breath, return of symptoms, or any other concerns.     Follow-Up Appointments:  Urology: 06/04/2024    Sky Ty HO-III  [facility] Internal Medicine

## 2024-05-23 NOTE — PLAN OF CARE
QUINCY informed [facility] via Post-i of pt's anticipated discharge date.     1:00p.m. QUINCY sent facility transfer orders via careNileGuide. Awaiting report.     QUINCY will continue to follow.     Future Appointments   Date Time Provider Department Center   6/4/2024  2:20 PM Candy Goff NP [facility] UROLOGY [facility]       05/23/24 1034   Post-Acute Status   Post-Acute Authorization Placement

## 2024-05-23 NOTE — PLAN OF CARE
D/c orders noted.     Set up completed for pt to return to [facility]. RN can call report to [phone] room 8B.     SW attempted to call pt's sister Kandy ([phone]) to inform her that pt will be returning to [facility]. No answer was noted. SW left voicemail.     QUINCY arranged transportation for pt to return to [facility] with the [facility] van driven by Ms. Chandler.    3:50p.m. QUINCY changed transportation to a wheelchair van.     Pt is cleared to go from CM standpoint.     Future Appointments   Date Time Provider Department Center   6/4/2024  2:20 PM Candy Goff NP [facility] UROLOGY [facility]         05/23/24 2943   Final Note   Assessment Type Final Discharge Note   Anticipated Discharge Disposition [facility]  ([facility])   What phone number can be called within the next 1-3 days to see how you are doing after discharge? [phone]   Post-Acute Status   Post-Acute Authorization Placement   Post-Acute Placement Status Set-up Complete/Auth obtained   Discharge Delays None known at this time

## 2024-05-24 ENCOUNTER — PATIENT OUTREACH (OUTPATIENT)
Dept: ADMINISTRATIVE | Facility: CLINIC | Age: 74
End: 2024-05-24
Payer: MEDICARE

## 2024-05-24 NOTE — NURSING
"Dr Cordon on unit floor. Informed of scheduled eliquis given, but pt has no IV access 2nd to pending D/C. IV d/c'd by day nurse, has scheduled maxipime due. MD ordered ok to hold this dose, MD stated "he doesn't need it."  "

## 2024-05-24 NOTE — NURSING
[facility] at BS with stretcher. Pt wide awake, denies any complaints. See graphics charted for VS. In stable condition. Sams in place. In NAD at present time. See tele strip printed.

## 2024-05-24 NOTE — NURSING
[facility] at  with a w/c. Pt unable to transport via W/C, unable to stably sit upright. Safety risk. Charge nurse demond notified. Ignacio with [facility] stated he will request the ambulance with a stretcher. Tele monitor remains on, paced 80s.

## 2024-05-24 NOTE — NURSING
Tele monitor placed back on while awaiting ambulance arrival. Paced in the 80s. Pt wide awake, denies any complaints.

## 2024-05-25 LAB — BACTERIA BLD CULT: NORMAL

## 2024-05-28 ENCOUNTER — OUTSIDE PLACE OF SERVICE (OUTPATIENT)
Dept: ADMINISTRATIVE | Facility: OTHER | Age: 74
End: 2024-05-28
Payer: MEDICARE

## 2024-05-28 PROCEDURE — 99308 SBSQ NF CARE LOW MDM 20: CPT | Mod: ,,, | Performed by: FAMILY MEDICINE

## 2024-05-29 ENCOUNTER — TELEPHONE (OUTPATIENT)
Dept: FAMILY MEDICINE | Facility: CLINIC | Age: 74
End: 2024-05-29
Payer: MEDICARE

## 2024-05-29 RX ORDER — OXYCODONE HYDROCHLORIDE 5 MG/1
5 TABLET ORAL EVERY 6 HOURS PRN
Qty: 30 TABLET | Refills: 0 | Status: SHIPPED | OUTPATIENT
Start: 2024-05-29

## 2024-06-04 ENCOUNTER — HOSPITAL ENCOUNTER (EMERGENCY)
Facility: HOSPITAL | Age: 74
End: 2024-06-05
Attending: STUDENT IN AN ORGANIZED HEALTH CARE EDUCATION/TRAINING PROGRAM
Payer: MEDICARE

## 2024-06-04 ENCOUNTER — OFFICE VISIT (OUTPATIENT)
Dept: UROLOGY | Facility: CLINIC | Age: 74
End: 2024-06-04
Payer: MEDICARE

## 2024-06-04 VITALS
SYSTOLIC BLOOD PRESSURE: 135 MMHG | HEIGHT: 69 IN | DIASTOLIC BLOOD PRESSURE: 61 MMHG | BODY MASS INDEX: 35.55 KG/M2 | WEIGHT: 240 LBS | HEART RATE: 82 BPM

## 2024-06-04 DIAGNOSIS — R33.9 URINARY RETENTION: Primary | ICD-10-CM

## 2024-06-04 PROCEDURE — 3078F DIAST BP <80 MM HG: CPT | Mod: CPTII,S$GLB,,

## 2024-06-04 PROCEDURE — 1111F DSCHRG MED/CURRENT MED MERGE: CPT | Mod: CPTII,S$GLB,,

## 2024-06-04 PROCEDURE — 1159F MED LIST DOCD IN RCRD: CPT | Mod: CPTII,S$GLB,,

## 2024-06-04 PROCEDURE — 51702 INSERT TEMP BLADDER CATH: CPT

## 2024-06-04 PROCEDURE — 82962 GLUCOSE BLOOD TEST: CPT

## 2024-06-04 PROCEDURE — 3046F HEMOGLOBIN A1C LEVEL >9.0%: CPT | Mod: CPTII,S$GLB,,

## 2024-06-04 PROCEDURE — 1160F RVW MEDS BY RX/DR IN RCRD: CPT | Mod: CPTII,S$GLB,,

## 2024-06-04 PROCEDURE — 99999 PR PBB SHADOW E&M-EST. PATIENT-LVL V: CPT | Mod: PBBFAC,,,

## 2024-06-04 PROCEDURE — 51798 US URINE CAPACITY MEASURE: CPT

## 2024-06-04 PROCEDURE — 1126F AMNT PAIN NOTED NONE PRSNT: CPT | Mod: CPTII,S$GLB,,

## 2024-06-04 PROCEDURE — 99214 OFFICE O/P EST MOD 30 MIN: CPT | Mod: S$GLB,,,

## 2024-06-04 PROCEDURE — 1157F ADVNC CARE PLAN IN RCRD: CPT | Mod: CPTII,S$GLB,,

## 2024-06-04 PROCEDURE — 3075F SYST BP GE 130 - 139MM HG: CPT | Mod: CPTII,S$GLB,,

## 2024-06-04 PROCEDURE — 99283 EMERGENCY DEPT VISIT LOW MDM: CPT | Mod: 25

## 2024-06-04 NOTE — PROGRESS NOTES
CHIEF COMPLAINT:  Urinary retention      HISTORY OF PRESENTING ILLINESS:  Saji Castañeda is a 75 yo M with a history of Afib, T2D, HLD, HTN, chronic osteomyelitis of L heel, s/p L lower extremity amputation, PID, ESBL Klebsiella and Acinetobacter bacteremia who was admitted after being found down at home and DKA March 2024. Urology consulted for urinary retention and difficult fung placement, fung catheter was placed. Twenty-two Zambian coude catheter was placed at bedside with some resistance at the prostate.  There was 1100 mL of clear yellow urine output.     5/9/2024 patient was consulted by urology inpatient at [facility] due to leukocytosis. Urology consulted for urinary retention. Per Dr. Watson, patient needed outpatient VT and office visit with physician to discuss management of urinary retention.      Patient endorses mild suprapubic pain.  No prior episodes of retention.  He was previously followed for his BPH by Dr. Watson. PVR in 2022 office visit was 237.            REVIEW OF SYSTEMS:  Review of Systems   Constitutional:  Negative for chills and fever.   HENT:  Negative for congestion and sore throat.    Respiratory:  Negative for cough and shortness of breath.    Cardiovascular:  Negative for chest pain and palpitations.   Gastrointestinal:  Negative for nausea and vomiting.   Genitourinary:  Negative for flank pain and hematuria.        Fung catheter present   Neurological:  Negative for dizziness and headaches.         PATIENT HISTORY:    Past Medical History:   Diagnosis Date    Arthritis     legs    Bacteremia due to Gram-negative bacteria 3/23/2021    Diabetes mellitus     Diabetes mellitus, type 2     Hyperlipidemia     Hypertension     Osteomyelitis     Palliative care encounter 5/24/2023       Past Surgical History:   Procedure Laterality Date    ABOVE-KNEE AMPUTATION Left 3/27/2024    Procedure: AMPUTATION, ABOVE KNEE;  Surgeon: Adal Arellano MD;  [facility]: [facility] OR [facility]  FLR;  Service: Vascular;  Laterality: Left;    ANGIOGRAPHY OF LOWER EXTREMITY N/A 2/3/2021    Procedure: Angiogram Extremity Bilateral;  Surgeon: Ernst Chacko MD;  [facility];  Service: Peripheral Vascular;  Laterality: N/A;  7.4 mintues fluoroscopy time  816.15 mGy  170.17 Gycm2    AORTOGRAPHY WITH EXTREMITY RUNOFF Bilateral 2/3/2021    Procedure: AORTOGRAM, WITH EXTREMITY RUNOFF;  Surgeon: Ernst Chacko MD;  [facility];  Service: Peripheral Vascular;  Laterality: Bilateral;    DEBRIDEMENT OF FOOT Left 2/23/2021    Procedure: DEBRIDEMENT, LEFT HEEL;  Surgeon: Mayra Schroeder DPM;  Location: [facility] OR 1ST FLR;  Service: Podiatry;  Laterality: Left;    FOOT AMPUTATION  October 2010    left high midfoot amputation    IMPLANTATION OF LEADLESS PACEMAKER N/A 10/12/2023    Procedure: [id number];  Surgeon: VANESSA Delatorre MD;  Location: [facility] EP LAB;  Service: Cardiology;  Laterality: N/A;  AVB, MICRA, EH, [place], [place] [id number]       Family History   Problem Relation Name Age of Onset    Diabetes Mother      Heart disease Mother      Stroke Sister      Cancer Brother      Diabetes Sister         Social History     Socioeconomic History    Marital status: Single   Tobacco Use    Smoking status: Never    Smokeless tobacco: Never   Substance and Sexual Activity    Alcohol use: No     Comment: occassional    Drug use: No    Sexual activity: Not Currently   Social History Narrative    Not currently working; lives with family     Social Determinants of Health     Financial Resource Strain: Medium Risk (3/5/2024)    Overall Financial Resource Strain (CARDIA)     Difficulty of Paying Living Expenses: Somewhat hard   Food Insecurity: No Food Insecurity (3/5/2024)    Hunger Vital Sign     Worried About Running Out of Food in the Last Year: Never true     Ran Out of Food in the Last Year: Never true   Transportation Needs: No Transportation Needs (3/5/2024)    PRAPARE -  "Transportation     Lack of Transportation (Medical): No     Lack of Transportation (Non-Medical): No   Physical Activity: Inactive (3/5/2024)    Exercise Vital Sign     Days of Exercise per Week: 0 days     Minutes of Exercise per Session: 0 min   Stress: No Stress Concern Present (3/5/2024)    [employer] - Occupational Stress Questionnaire     Feeling of Stress : Only a little   Housing Stability: Low Risk  (3/5/2024)    Housing Stability Vital Sign     Unable to Pay for Housing in the Last Year: No     Number of Places Lived in the Last Year: 1     Unstable Housing in the Last Year: No       Allergies:  Patient has no known allergies.    Medications:    Current Outpatient Medications:     acetaminophen (TYLENOL) 325 MG tablet, Take 650 mg by mouth every 6 (six) hours as needed for Pain., Disp: , Rfl:     acidophilus-pectin, citrus 100 million cell-10 mg Cap, Take 1 capsule by mouth 2 (two) times a day., Disp: , Rfl:     apixaban (ELIQUIS) 5 mg Tab, Take 1 tablet (5 mg total) by mouth 2 (two) times daily., Disp: , Rfl:     arginine-glutamine-calcium HMB (HARITHA) 7-7-1.5 gram PwPk, Take 1 packet by mouth 2 (two) times a day., Disp: , Rfl:     ascorbic acid, vitamin C, (VITAMIN C) 500 MG tablet, Take 500 mg by mouth 2 (two) times daily., Disp: , Rfl:     B COMPLEX-VITAMIN B12 tablet, Take 1 tablet by mouth once daily., Disp: , Rfl:     BD AUTOSHIELD DUO PEN NEEDLE 30 gauge x 3/16" Ndle, , Disp: , Rfl:     BD ULTRA-FINE ADITYA PEN NEEDLE 32 gauge x 5/32" Ndle, USE FOUR TIMES DAILY WITH MEALS AND NIGHTLY, Disp: 100 each, Rfl: 0    blood sugar diagnostic (CONTOUR TEST STRIPS) Strp, 1 strip by Misc.(Non-Drug; Combo Route) route 4 (four) times daily. Use to check blood glucose 4x daily, Disp: 100 strip, Rfl: 6    ergocalciferol (ERGOCALCIFEROL) 50,000 unit Cap, Take 1 capsule (50,000 Units total) by mouth every 7 days. for 10 doses, Disp: , Rfl:     ferrous sulfate 325 (65 FE) MG EC tablet, Take " 325 mg by mouth 2 (two) times daily., Disp: , Rfl:     furosemide (LASIX) 20 MG tablet, Take 1 tablet (20 mg total) by mouth once daily., Disp: 30 tablet, Rfl: 11    gabapentin (NEURONTIN) 300 MG capsule, Take 300 mg by mouth 2 (two) times daily., Disp: , Rfl:     insulin aspart U-100 (NOVOLOG) 100 unit/mL (3 mL) InPn pen, Inject 5 Units into the skin 3 (three) times daily with meals., Disp: 3 each, Rfl: 11    insulin aspart U-100 (NOVOLOG) 100 unit/mL injection, Inject 0-10 Units into the skin 3 (three) times daily before meals. 150-200: 2 units 201-250: 4 units 251-300: 6 units 301-350: 8 units >350: 10 units, Disp: , Rfl:     insulin detemir U-100, Levemir, (LEVEMIR FLEXPEN) 100 unit/mL (3 mL) InPn pen, Inject 12 Units into the skin 2 (two) times daily., Disp: 21.6 mL, Rfl: 3    isosorbide dinitrate (ISORDIL) 10 MG tablet, Take 1 tablet (10 mg total) by mouth 3 (three) times daily. Hold until follow up with a provider, Disp: , Rfl:     lancets (MICROLET LANCET) Misc, 1 each by Misc.(Non-Drug; Combo Route) route 4 (four) times daily. Use to check blood sugars 4x daily, Disp: , Rfl: 0    multivitamin (THERAGRAN) per tablet, Take 1 tablet by mouth once daily., Disp: , Rfl:     NIFEdipine (PROCARDIA-XL) 90 MG (OSM) 24 hr tablet, Take 1 tablet (90 mg total) by mouth once daily., Disp: 30 tablet, Rfl: 11    oxyCODONE (ROXICODONE) 5 MG immediate release tablet, Take 1 tablet (5 mg total) by mouth every 6 (six) hours as needed for Pain., Disp: 30 tablet, Rfl: 0    pantoprazole (PROTONIX) 40 MG tablet, Take 40 mg by mouth once daily. Before breakfast, Disp: , Rfl:     tamsulosin (FLOMAX) 0.4 mg Cap, Take 0.4 mg by mouth once daily., Disp: , Rfl:     PHYSICAL EXAMINATION:  Physical Exam  HENT:      Nose: Nose normal.      Mouth/Throat:      Mouth: Mucous membranes are moist.   Pulmonary:      Effort: Pulmonary effort is normal.   Musculoskeletal:      Comments: In WC, L AKA   Skin:     General: Skin is warm and dry.    Neurological:      General: No focal deficit present.      Mental Status: He is alert.   Psychiatric:         Mood and Affect: Mood normal.                 Lab Results   Component Value Date    PSA 4.8 (H) 12/23/2005       Lab Results   Component Value Date    CREATININE 0.8 05/22/2024    EGFRNORACEVR >60 05/22/2024               IMPRESSION:    Encounter Diagnoses   Name Primary?    Urinary retention Yes         Assessment:       1. Urinary retention        Plan:   Voiding trial performed by Nurse Jorge. Fung catheter was removed in office without issue.     Patient was instructed to drink plenty of fluids today.  Catheter was removed in clinic at 1430.   If patient is unable to urinate within 5 hours, he will need to present to ED to have fung catheter replaced or sooner if he starts to experience bladder pressure/pain, decrease flow, straining/difficulty urinating, urinary frequency.      Return to clinic for consult with Dr. Gonzales for urinary retention management options     I spent 30 minutes with the patient of which more than half was spent in direct consultation with the patient in regards to our treatment and plan.  We addressed the office findings and recent labs; any need to go ER today.   Education and recommendations of today's plan of care including home remedies and needed follow up with PCP.   We discussed the chief complaints; reviewed the LUTS and the possible contributory factors.   Reassurance no infection or visible blood seen in today's urine sample

## 2024-06-04 NOTE — PATIENT INSTRUCTIONS
Patient was instructed to drink plenty of fluids today.  Catheter was removed in clinic at 1430.   If patient is unable to urinate within 5 hours, he will need to present to ED to have fung catheter replaced or sooner if he starts to experience bladder pressure/pain, decrease flow, straining/difficulty urinating, urinary frequency.      Return to clinic for consult with Dr. Gonzales for urinary retention management options

## 2024-06-05 VITALS
DIASTOLIC BLOOD PRESSURE: 68 MMHG | BODY MASS INDEX: 35.45 KG/M2 | OXYGEN SATURATION: 99 % | TEMPERATURE: 100 F | RESPIRATION RATE: 15 BRPM | WEIGHT: 240.06 LBS | SYSTOLIC BLOOD PRESSURE: 152 MMHG | HEART RATE: 61 BPM

## 2024-06-05 LAB — POCT GLUCOSE: 274 MG/DL (ref 70–110)

## 2024-06-05 RX ORDER — TAMSULOSIN HYDROCHLORIDE 0.4 MG/1
0.4 CAPSULE ORAL DAILY
Qty: 14 CAPSULE | Refills: 0 | Status: SHIPPED | OUTPATIENT
Start: 2024-06-05 | End: 2024-06-19

## 2024-06-05 RX ORDER — SODIUM CHLORIDE 9 MG/ML
1000 INJECTION, SOLUTION INTRAVENOUS
Status: DISCONTINUED | OUTPATIENT
Start: 2024-06-05 | End: 2024-06-05 | Stop reason: HOSPADM

## 2024-06-05 NOTE — ED NOTES
Pt laying in bed resting with eyes closed. Rise and fall of chest noted, easy to awake. Reports that he still down not feel the urge to urinate. Diaper dry.

## 2024-06-05 NOTE — ED NOTES
Report received from BEREKET Treviño   Assumed care of pt pt resting in stretcher voices no complaints   Side rails up x 2 and call light within reach

## 2024-06-05 NOTE — ED NOTES
Attempted to call report to nursing home. Unsuccessful. Pt laying in bed resting with eyes closed. Rise and fall of chest noted.

## 2024-06-05 NOTE — ED NOTES
Patient still has not voided. Reports he is incontinent. Patient drank 1 cup of water, 1 cup of OJ, and 1 cup of apple  juice.

## 2024-06-05 NOTE — ED NOTES
MD aware of patient's blood sugar.     Patient diaper still clean and dry.    Bladder scanned for 384. Patient denies pain and pressure to bladder.   MD notified.     Patient resting in bed with side rails up x 2. Call light within reach. Watching tv.

## 2024-06-05 NOTE — ED TRIAGE NOTES
Patient arrived to ED via EMS from  [facility] with complaints of inability to void.   Patient discharged from [facility] on 6/4/24 around 1600. Patient reports during his admission he had a catheter in place. Around 1600, fung catheter removed. Patient has not voided since catheter removal. Patient denies pain and urge to void at this time.   Awake, alert, oriented  x 4. Breathing unlabored and even.   Patient has leg amputated. R foot wrapped in bulky wound dressing. CDI.

## 2024-06-05 NOTE — ED NOTES
Pt laying in bed resting comfortable asked for light to be turned out. Denies any needs at this time. Bed locked and in the lowest position, side rails up x2, call bell in reach.

## 2024-06-05 NOTE — ED PROVIDER NOTES
"Encounter Date: 6/4/2024       History     Chief Complaint   Patient presents with    Male  Problem     PT to the ER via [facility] unit 77 transferred from [place] with c/o inability to urinate. Pt had indwelling catheter removed this morning at [facility] and he was returned to NH. It is reported that pt has not urinated at all since catheter removal this morning.      HPI  74-year-old male with history of recent hospital admission, discharge from the hospital today and Sams rooms today, presents brought in from the SNF he arrived at for inability to void."Patient denies any complaints and states he does not have to pee.    Review of patient's allergies indicates:  No Known Allergies  Past Medical History:   Diagnosis Date    Arthritis     legs    Bacteremia due to Gram-negative bacteria 3/23/2021    Diabetes mellitus     Diabetes mellitus, type 2     Hyperlipidemia     Hypertension     Osteomyelitis     Palliative care encounter 5/24/2023     Past Surgical History:   Procedure Laterality Date    ABOVE-KNEE AMPUTATION Left 3/27/2024    Procedure: AMPUTATION, ABOVE KNEE;  Surgeon: Adal Arellano MD;  Location: [facility] OR [address] [place] [address];  Service: Vascular;  Laterality: Left;    ANGIOGRAPHY OF LOWER EXTREMITY N/A 2/3/2021    Procedure: Angiogram Extremity Bilateral;  Surgeon: Ernst Chacko MD;  Location: [facility] OR [address] [place] [address];  Service: Peripheral Vascular;  Laterality: N/A;  7.4 mintues fluoroscopy time  816.15 mGy  170.17 Gycm2    AORTOGRAPHY WITH EXTREMITY RUNOFF Bilateral 2/3/2021    Procedure: AORTOGRAM, WITH EXTREMITY RUNOFF;  Surgeon: Ernst Chacko MD;  Location: [facility] OR [address] [place] [address];  Service: Peripheral Vascular;  Laterality: Bilateral;    DEBRIDEMENT OF FOOT Left 2/23/2021    Procedure: DEBRIDEMENT, LEFT HEEL;  Surgeon: Mayra Schroeder DPM;  Location: [facility] OR [address] [place] [address];  Service: Podiatry;  Laterality: Left;    FOOT AMPUTATION  October 2010    left high midfoot amputation    IMPLANTATION " OF LEADLESS PACEMAKER N/A 10/12/2023    Procedure: [id number];  Surgeon: VANESSA Delatorre MD;  Location: [facility];  Service: Cardiology;  Laterality: N/A;  AVB, MICRA, EH, [place], [place] [address]     Family History   Problem Relation Name Age of Onset    Diabetes Mother      Heart disease Mother      Stroke Sister      Cancer Brother      Diabetes Sister       Social History     Tobacco Use    Smoking status: Never    Smokeless tobacco: Never   Substance Use Topics    Alcohol use: No     Comment: occassional    Drug use: No   Medical, surgical, family, and social history reviewed and considered in medical decision-making  Review of Systems  Complete review of systems was conducted and was negative except as per HPI and as marked    Physical Exam     Initial Vitals [06/04/24 2300]   BP Pulse Resp Temp SpO2   (!) 152/54 (!) 55 15 99.7 °F (37.6 °C) 95 %      MAP       --         Physical Exam  Physical:  General: Awake, alert, no acute distress  Head: Normocephalic, atraumatic  Neck: Trachea midline, full range of motion, no meningismus  ENT: Atraumatic, Airway Patent  Cardio: Regular Rate, Regular Rhythm, skin perfusion normal  Chest: Atraumatic, No respiratory distress no use of accessory muscles to breath, normal rate  Abd- nontender nondistended  Neuro: No gross cranial nerve abnormality, no lateralization, no gross sensory or motor deficits     ED Course   Procedures  Labs Reviewed   POCT GLUCOSE - Abnormal; Notable for the following components:       Result Value    POCT Glucose 274 (*)     All other components within normal limits          Imaging Results    None          Medications   0.9%  NaCl infusion (has no administration in time range)     Medical Decision Making  Risk  Prescription drug management.               ED Course as of 06/05/24 0410 Wed Jun 05, 2024   0037 Reevaluated, encouraged to try to void.  [DS]      ED Course User Index  [DS] Harris Damian MD          74-year-old male  discharged from hospital today presents because nursing home stated that he could not void.  He states he does not have to.  Bladder scan shows 270 mL in bladder.  Was encouraged to drink juice and try to urinate.          Patient observed for several hours, we in during which time he was heavily encouraged by nursing to take p.o., including water and juices, did this a little, but to a limited degree.  Despite both myself in the nurse's repeatedly asking him to void over the course of the next several hours, patient states that I do not need to on recheck at 4:00 a.m., he has about 500 in his bladder.  He still states that he has no urge to void.  We did offer him more aggressive IV hydration for further trialing, and he adamantly refused this.  Given that he has about 500 in his bladder, and does not seem to have any urge to void, he meets criteria for urinary retention, and has not been able to void even any sample for several hours, he likely will require ongoing catheter care.  A Sams is being placed for urinary retention.        Clinical Impression:  Final diagnoses:  [R33.9] Urinary retention (Primary)          ED Disposition Condition    Discharge Stable          ED Prescriptions       Medication Sig Dispense Start Date End Date Auth. Provider    tamsulosin (FLOMAX) 0.4 mg Cap Take 1 capsule (0.4 mg total) by mouth once daily. for 14 days 14 capsule 6/5/2024 6/19/2024 Harris Damian MD          Follow-up Information    None          Harris Damian MD  06/05/24 7455

## 2024-06-05 NOTE — DISCHARGE INSTRUCTIONS
Follow-up with your urologist for voiding trial in 1 week.  Return to the emergency department if condition worsens in any way.

## 2024-06-05 NOTE — ED NOTES
Pt laying in bed . Denies the urge to urinate at the moment. Given one cup of water. Educated to let the nurse know if and when he needs to use the restroom.

## 2024-06-10 NOTE — PROGRESS NOTES
Subjective:       Patient ID: Saji Castañeda is a 74 y.o. male.    Chief Complaint: discussion urinary retention     This is a 74 y.o.  male patient that is an established patient of the system.       DKA March 2024. Urology consulted for urinary retention and difficult fung placement, fung catheter was placed. Twenty-two Sami coude catheter was placed at bedside with some resistance at the prostate.  There was 1100 mL of clear yellow urine output.     5/9/2024 patient was consulted by urology inpatient at [facility] due to leukocytosis. Urology consulted for urinary retention.  Was seen by nurse practitioner at [facility] and had void trial on 06/04/2024.  Failed void trial require replacement emergency department, no sensation to void.    LAST PSA  Lab Results   Component Value Date    PSA 4.8 (H) 12/23/2005       Lab Results   Component Value Date    CREATININE 0.8 05/22/2024       ---  PMH/PSH/Medications/Allergies/Social history reviewed and as in chart.    Review of Systems   Constitutional:  Negative for activity change, chills and fever.   HENT:  Negative for congestion.    Respiratory:  Negative for cough, chest tightness and shortness of breath.    Cardiovascular:  Negative for chest pain and palpitations.   Gastrointestinal:  Negative for abdominal distention, abdominal pain, nausea and vomiting.   Genitourinary:  Positive for difficulty urinating. Negative for flank pain, hematuria, penile pain, scrotal swelling and testicular pain.   Musculoskeletal:  Negative for gait problem.       Objective:      Physical Exam    Assessment:     Problem Noted   Urinary Retention 3/20/2024       Plan:     Has now failed multiple void trials required difficult catheter placements in the past.  Discussed options including chronic Fung catheter, suprapubic tube, urodynamics to assess bladder function.  Patient wishes to continue Fung catheter for now.  Follow up in 3-4 weeks with nurse practitioner for Fung  catheter exchange.      Hai Watson MD    The above referenced imaging and interpretations were personally reviewed.  Disclaimer: This note has been generated using voice-recognition software. There may be typographical errors that have been missed during proof-reading.

## 2024-06-11 ENCOUNTER — OFFICE VISIT (OUTPATIENT)
Dept: UROLOGY | Facility: CLINIC | Age: 74
End: 2024-06-11
Payer: MEDICARE

## 2024-06-11 VITALS
DIASTOLIC BLOOD PRESSURE: 50 MMHG | BODY MASS INDEX: 35.56 KG/M2 | SYSTOLIC BLOOD PRESSURE: 110 MMHG | WEIGHT: 240.06 LBS | HEART RATE: 67 BPM | HEIGHT: 69 IN

## 2024-06-11 DIAGNOSIS — R33.9 URINARY RETENTION: ICD-10-CM

## 2024-06-11 PROCEDURE — 1157F ADVNC CARE PLAN IN RCRD: CPT | Mod: CPTII,S$GLB,, | Performed by: UROLOGY

## 2024-06-11 PROCEDURE — 3074F SYST BP LT 130 MM HG: CPT | Mod: CPTII,S$GLB,, | Performed by: UROLOGY

## 2024-06-11 PROCEDURE — 99213 OFFICE O/P EST LOW 20 MIN: CPT | Mod: S$GLB,,, | Performed by: UROLOGY

## 2024-06-11 PROCEDURE — 99999 PR PBB SHADOW E&M-EST. PATIENT-LVL V: CPT | Mod: PBBFAC,,, | Performed by: UROLOGY

## 2024-06-11 PROCEDURE — 3288F FALL RISK ASSESSMENT DOCD: CPT | Mod: CPTII,S$GLB,, | Performed by: UROLOGY

## 2024-06-11 PROCEDURE — 3078F DIAST BP <80 MM HG: CPT | Mod: CPTII,S$GLB,, | Performed by: UROLOGY

## 2024-06-11 PROCEDURE — 1111F DSCHRG MED/CURRENT MED MERGE: CPT | Mod: CPTII,S$GLB,, | Performed by: UROLOGY

## 2024-06-11 PROCEDURE — 1126F AMNT PAIN NOTED NONE PRSNT: CPT | Mod: CPTII,S$GLB,, | Performed by: UROLOGY

## 2024-06-11 PROCEDURE — 1160F RVW MEDS BY RX/DR IN RCRD: CPT | Mod: CPTII,S$GLB,, | Performed by: UROLOGY

## 2024-06-11 PROCEDURE — 1159F MED LIST DOCD IN RCRD: CPT | Mod: CPTII,S$GLB,, | Performed by: UROLOGY

## 2024-06-11 PROCEDURE — 1101F PT FALLS ASSESS-DOCD LE1/YR: CPT | Mod: CPTII,S$GLB,, | Performed by: UROLOGY

## 2024-06-11 PROCEDURE — 3046F HEMOGLOBIN A1C LEVEL >9.0%: CPT | Mod: CPTII,S$GLB,, | Performed by: UROLOGY

## 2024-06-11 RX ORDER — POTASSIUM CHLORIDE 20 MEQ/1
20 TABLET, EXTENDED RELEASE ORAL 2 TIMES DAILY
COMMUNITY
Start: 2024-04-20

## 2024-06-11 RX ORDER — COLLAGENASE SANTYL 250 [ARB'U]/G
OINTMENT TOPICAL
COMMUNITY
Start: 2024-06-07

## 2024-06-11 RX ORDER — BUMETANIDE 1 MG/1
TABLET ORAL
COMMUNITY
Start: 2024-02-06

## 2024-06-11 RX ORDER — FLUCYTOSINE 500 MG/1
CAPSULE ORAL
COMMUNITY
Start: 2024-01-23

## 2024-06-11 RX ORDER — BLOOD-GLUCOSE METER
EACH MISCELLANEOUS
COMMUNITY
Start: 2024-02-14

## 2024-06-11 RX ORDER — BUMETANIDE 0.5 MG/1
0.5 TABLET ORAL DAILY PRN
COMMUNITY
Start: 2024-01-10

## 2024-06-11 RX ORDER — ASPIRIN 81 MG/1
81 TABLET ORAL
COMMUNITY
Start: 2023-12-28

## 2024-06-11 RX ORDER — FLUCONAZOLE 100 MG/1
100 TABLET ORAL
COMMUNITY
Start: 2024-05-16

## 2024-06-11 RX ORDER — HYDRALAZINE HYDROCHLORIDE 100 MG/1
TABLET, FILM COATED ORAL
COMMUNITY
Start: 2024-04-08

## 2024-06-26 ENCOUNTER — HOSPITAL ENCOUNTER (INPATIENT)
Facility: HOSPITAL | Age: 74
LOS: 14 days | Discharge: NURSING, REHAB OR LONG-TERM CARE | DRG: 628 | End: 2024-07-11
Attending: EMERGENCY MEDICINE | Admitting: STUDENT IN AN ORGANIZED HEALTH CARE EDUCATION/TRAINING PROGRAM
Payer: MEDICARE

## 2024-06-26 DIAGNOSIS — Z97.8 FOLEY CATHETER IN PLACE: ICD-10-CM

## 2024-06-26 DIAGNOSIS — D64.9 ANEMIA REQUIRING TRANSFUSIONS: ICD-10-CM

## 2024-06-26 DIAGNOSIS — D64.9 LOW HEMOGLOBIN: ICD-10-CM

## 2024-06-26 DIAGNOSIS — D64.9 ANEMIA: Primary | ICD-10-CM

## 2024-06-26 DIAGNOSIS — S31.000S WOUND OF SACRAL REGION, SEQUELA: ICD-10-CM

## 2024-06-26 DIAGNOSIS — D64.9 ANEMIA, UNSPECIFIED TYPE: ICD-10-CM

## 2024-06-26 PROBLEM — T07.XXXA WOUNDS, MULTIPLE: Status: ACTIVE | Noted: 2024-06-26

## 2024-06-26 LAB
ABO GROUP BLD: NORMAL
ALBUMIN SERPL BCP-MCNC: 1.6 G/DL (ref 3.5–5.2)
ALP SERPL-CCNC: 79 U/L (ref 55–135)
ALT SERPL W/O P-5'-P-CCNC: 17 U/L (ref 10–44)
ANION GAP SERPL CALC-SCNC: 9 MMOL/L (ref 8–16)
AST SERPL-CCNC: 26 U/L (ref 10–40)
BASOPHILS # BLD AUTO: 0.03 K/UL (ref 0–0.2)
BASOPHILS # BLD AUTO: 0.04 K/UL (ref 0–0.2)
BASOPHILS NFR BLD: 0.3 % (ref 0–1.9)
BASOPHILS NFR BLD: 0.4 % (ref 0–1.9)
BILIRUB SERPL-MCNC: 0.2 MG/DL (ref 0.1–1)
BLD GP AB SCN CELLS X3 SERPL QL: NORMAL
BLD PROD TYP BPU: NORMAL
BLOOD UNIT EXPIRATION DATE: NORMAL
BLOOD UNIT TYPE CODE: 5100
BLOOD UNIT TYPE: NORMAL
BUN SERPL-MCNC: 13 MG/DL (ref 8–23)
CALCIUM SERPL-MCNC: 8.8 MG/DL (ref 8.7–10.5)
CHLORIDE SERPL-SCNC: 98 MMOL/L (ref 95–110)
CO2 SERPL-SCNC: 33 MMOL/L (ref 23–29)
CODING SYSTEM: NORMAL
CREAT SERPL-MCNC: 0.9 MG/DL (ref 0.5–1.4)
CROSSMATCH INTERPRETATION: NORMAL
DIFFERENTIAL METHOD BLD: ABNORMAL
DIFFERENTIAL METHOD BLD: ABNORMAL
DISPENSE STATUS: NORMAL
EOSINOPHIL # BLD AUTO: 0.3 K/UL (ref 0–0.5)
EOSINOPHIL # BLD AUTO: 0.3 K/UL (ref 0–0.5)
EOSINOPHIL NFR BLD: 2.5 % (ref 0–8)
EOSINOPHIL NFR BLD: 2.7 % (ref 0–8)
ERYTHROCYTE [DISTWIDTH] IN BLOOD BY AUTOMATED COUNT: 18.8 % (ref 11.5–14.5)
ERYTHROCYTE [DISTWIDTH] IN BLOOD BY AUTOMATED COUNT: 18.9 % (ref 11.5–14.5)
EST. GFR  (NO RACE VARIABLE): >60 ML/MIN/1.73 M^2
ESTIMATED AVG GLUCOSE: 148 MG/DL (ref 68–131)
FERRITIN SERPL-MCNC: 712 NG/ML (ref 20–300)
GLUCOSE SERPL-MCNC: 233 MG/DL (ref 70–110)
HBA1C MFR BLD: 6.8 % (ref 4–5.6)
HCT VFR BLD AUTO: 20.6 % (ref 40–54)
HCT VFR BLD AUTO: 22.7 % (ref 40–54)
HGB BLD-MCNC: 6.3 G/DL (ref 14–18)
HGB BLD-MCNC: 7 G/DL (ref 14–18)
IMM GRANULOCYTES # BLD AUTO: 0.03 K/UL (ref 0–0.04)
IMM GRANULOCYTES # BLD AUTO: 0.05 K/UL (ref 0–0.04)
IMM GRANULOCYTES NFR BLD AUTO: 0.3 % (ref 0–0.5)
IMM GRANULOCYTES NFR BLD AUTO: 0.5 % (ref 0–0.5)
IRON SERPL-MCNC: 10 UG/DL (ref 45–160)
LYMPHOCYTES # BLD AUTO: 1 K/UL (ref 1–4.8)
LYMPHOCYTES # BLD AUTO: 1.1 K/UL (ref 1–4.8)
LYMPHOCYTES NFR BLD: 10.9 % (ref 18–48)
LYMPHOCYTES NFR BLD: 11.1 % (ref 18–48)
MCH RBC QN AUTO: 25.4 PG (ref 27–31)
MCH RBC QN AUTO: 25.6 PG (ref 27–31)
MCHC RBC AUTO-ENTMCNC: 30.6 G/DL (ref 32–36)
MCHC RBC AUTO-ENTMCNC: 30.8 G/DL (ref 32–36)
MCV RBC AUTO: 82 FL (ref 82–98)
MCV RBC AUTO: 84 FL (ref 82–98)
MONOCYTES # BLD AUTO: 0.6 K/UL (ref 0.3–1)
MONOCYTES # BLD AUTO: 0.7 K/UL (ref 0.3–1)
MONOCYTES NFR BLD: 6.2 % (ref 4–15)
MONOCYTES NFR BLD: 6.9 % (ref 4–15)
NEUTROPHILS # BLD AUTO: 7.4 K/UL (ref 1.8–7.7)
NEUTROPHILS # BLD AUTO: 8.3 K/UL (ref 1.8–7.7)
NEUTROPHILS NFR BLD: 78.7 % (ref 38–73)
NEUTROPHILS NFR BLD: 79.5 % (ref 38–73)
NRBC BLD-RTO: 0 /100 WBC
NRBC BLD-RTO: 0 /100 WBC
NUM UNITS TRANS PACKED RBC: NORMAL
PLATELET # BLD AUTO: 500 K/UL (ref 150–450)
PLATELET # BLD AUTO: 524 K/UL (ref 150–450)
PMV BLD AUTO: 8.4 FL (ref 9.2–12.9)
PMV BLD AUTO: 8.6 FL (ref 9.2–12.9)
POCT GLUCOSE: 182 MG/DL (ref 70–110)
POCT GLUCOSE: 212 MG/DL (ref 70–110)
POTASSIUM SERPL-SCNC: 3 MMOL/L (ref 3.5–5.1)
PROT SERPL-MCNC: 6.7 G/DL (ref 6–8.4)
RBC # BLD AUTO: 2.46 M/UL (ref 4.6–6.2)
RBC # BLD AUTO: 2.76 M/UL (ref 4.6–6.2)
RH BLD: NORMAL
SATURATED IRON: 8 % (ref 20–50)
SODIUM SERPL-SCNC: 140 MMOL/L (ref 136–145)
SPECIMEN OUTDATE: NORMAL
TOTAL IRON BINDING CAPACITY: 118 UG/DL (ref 250–450)
TRANSFERRIN SERPL-MCNC: 80 MG/DL (ref 200–375)
TROPONIN I SERPL DL<=0.01 NG/ML-MCNC: 0.01 NG/ML (ref 0–0.03)
WBC # BLD AUTO: 10.38 K/UL (ref 3.9–12.7)
WBC # BLD AUTO: 9.41 K/UL (ref 3.9–12.7)

## 2024-06-26 PROCEDURE — 36430 TRANSFUSION BLD/BLD COMPNT: CPT

## 2024-06-26 PROCEDURE — 25000003 PHARM REV CODE 250: Performed by: NURSE PRACTITIONER

## 2024-06-26 PROCEDURE — 87040 BLOOD CULTURE FOR BACTERIA: CPT | Mod: 59 | Performed by: NURSE PRACTITIONER

## 2024-06-26 PROCEDURE — 84484 ASSAY OF TROPONIN QUANT: CPT

## 2024-06-26 PROCEDURE — 86900 BLOOD TYPING SEROLOGIC ABO: CPT

## 2024-06-26 PROCEDURE — 84466 ASSAY OF TRANSFERRIN: CPT

## 2024-06-26 PROCEDURE — 99285 EMERGENCY DEPT VISIT HI MDM: CPT | Mod: 25

## 2024-06-26 PROCEDURE — 93010 ELECTROCARDIOGRAM REPORT: CPT | Mod: ,,, | Performed by: INTERNAL MEDICINE

## 2024-06-26 PROCEDURE — G0378 HOSPITAL OBSERVATION PER HR: HCPCS

## 2024-06-26 PROCEDURE — 86901 BLOOD TYPING SEROLOGIC RH(D): CPT

## 2024-06-26 PROCEDURE — 93005 ELECTROCARDIOGRAM TRACING: CPT

## 2024-06-26 PROCEDURE — 30233N1 TRANSFUSION OF NONAUTOLOGOUS RED BLOOD CELLS INTO PERIPHERAL VEIN, PERCUTANEOUS APPROACH: ICD-10-PCS | Performed by: STUDENT IN AN ORGANIZED HEALTH CARE EDUCATION/TRAINING PROGRAM

## 2024-06-26 PROCEDURE — 63600175 PHARM REV CODE 636 W HCPCS: Performed by: NURSE PRACTITIONER

## 2024-06-26 PROCEDURE — 86850 RBC ANTIBODY SCREEN: CPT

## 2024-06-26 PROCEDURE — 25000003 PHARM REV CODE 250

## 2024-06-26 PROCEDURE — 82728 ASSAY OF FERRITIN: CPT

## 2024-06-26 PROCEDURE — A4216 STERILE WATER/SALINE, 10 ML: HCPCS | Performed by: NURSE PRACTITIONER

## 2024-06-26 PROCEDURE — C9113 INJ PANTOPRAZOLE SODIUM, VIA: HCPCS | Performed by: NURSE PRACTITIONER

## 2024-06-26 PROCEDURE — 85025 COMPLETE CBC W/AUTO DIFF WBC: CPT

## 2024-06-26 PROCEDURE — 85025 COMPLETE CBC W/AUTO DIFF WBC: CPT | Mod: 91 | Performed by: NURSE PRACTITIONER

## 2024-06-26 PROCEDURE — 80053 COMPREHEN METABOLIC PANEL: CPT

## 2024-06-26 PROCEDURE — 96372 THER/PROPH/DIAG INJ SC/IM: CPT | Performed by: NURSE PRACTITIONER

## 2024-06-26 PROCEDURE — 36415 COLL VENOUS BLD VENIPUNCTURE: CPT | Performed by: NURSE PRACTITIONER

## 2024-06-26 PROCEDURE — 86920 COMPATIBILITY TEST SPIN: CPT

## 2024-06-26 PROCEDURE — P9016 RBC LEUKOCYTES REDUCED: HCPCS

## 2024-06-26 PROCEDURE — 83036 HEMOGLOBIN GLYCOSYLATED A1C: CPT

## 2024-06-26 RX ORDER — ALUMINUM HYDROXIDE, MAGNESIUM HYDROXIDE, AND SIMETHICONE 1200; 120; 1200 MG/30ML; MG/30ML; MG/30ML
30 SUSPENSION ORAL 4 TIMES DAILY PRN
Status: DISCONTINUED | OUTPATIENT
Start: 2024-06-26 | End: 2024-07-11 | Stop reason: HOSPADM

## 2024-06-26 RX ORDER — SIMETHICONE 80 MG
1 TABLET,CHEWABLE ORAL 4 TIMES DAILY PRN
Status: DISCONTINUED | OUTPATIENT
Start: 2024-06-26 | End: 2024-07-11 | Stop reason: HOSPADM

## 2024-06-26 RX ORDER — POTASSIUM CHLORIDE 20 MEQ/1
40 TABLET, EXTENDED RELEASE ORAL
Status: COMPLETED | OUTPATIENT
Start: 2024-06-26 | End: 2024-06-26

## 2024-06-26 RX ORDER — GLUCAGON 1 MG
1 KIT INJECTION
Status: DISCONTINUED | OUTPATIENT
Start: 2024-06-26 | End: 2024-07-11 | Stop reason: HOSPADM

## 2024-06-26 RX ORDER — ZINC SULFATE 50(220)MG
220 CAPSULE ORAL EVERY MORNING
COMMUNITY

## 2024-06-26 RX ORDER — SODIUM CHLORIDE 0.9 % (FLUSH) 0.9 %
10 SYRINGE (ML) INJECTION EVERY 8 HOURS
Status: DISCONTINUED | OUTPATIENT
Start: 2024-06-26 | End: 2024-07-11 | Stop reason: HOSPADM

## 2024-06-26 RX ORDER — IBUPROFEN 200 MG
24 TABLET ORAL
Status: DISCONTINUED | OUTPATIENT
Start: 2024-06-26 | End: 2024-07-11 | Stop reason: HOSPADM

## 2024-06-26 RX ORDER — TALC
6 POWDER (GRAM) TOPICAL NIGHTLY PRN
Status: DISCONTINUED | OUTPATIENT
Start: 2024-06-26 | End: 2024-07-11 | Stop reason: HOSPADM

## 2024-06-26 RX ORDER — TAMSULOSIN HYDROCHLORIDE 0.4 MG/1
0.4 CAPSULE ORAL DAILY
Status: DISCONTINUED | OUTPATIENT
Start: 2024-06-27 | End: 2024-07-11 | Stop reason: HOSPADM

## 2024-06-26 RX ORDER — AMOXICILLIN 250 MG
1 CAPSULE ORAL 2 TIMES DAILY
Status: DISCONTINUED | OUTPATIENT
Start: 2024-06-26 | End: 2024-07-11 | Stop reason: HOSPADM

## 2024-06-26 RX ORDER — MENTHOL AND ZINC OXIDE .44; 20.625 G/100G; G/100G
OINTMENT TOPICAL
COMMUNITY

## 2024-06-26 RX ORDER — GABAPENTIN 300 MG/1
300 CAPSULE ORAL 2 TIMES DAILY
Status: DISCONTINUED | OUTPATIENT
Start: 2024-06-26 | End: 2024-07-11 | Stop reason: HOSPADM

## 2024-06-26 RX ORDER — INSULIN GLARGINE 100 [IU]/ML
12 INJECTION, SOLUTION SUBCUTANEOUS 2 TIMES DAILY
COMMUNITY
Start: 2024-06-21

## 2024-06-26 RX ORDER — HYDROCODONE BITARTRATE AND ACETAMINOPHEN 500; 5 MG/1; MG/1
TABLET ORAL
Status: DISCONTINUED | OUTPATIENT
Start: 2024-06-26 | End: 2024-07-11 | Stop reason: HOSPADM

## 2024-06-26 RX ORDER — ONDANSETRON 8 MG/1
8 TABLET, ORALLY DISINTEGRATING ORAL EVERY 8 HOURS PRN
Status: DISCONTINUED | OUTPATIENT
Start: 2024-06-26 | End: 2024-07-11 | Stop reason: HOSPADM

## 2024-06-26 RX ORDER — POVIDONE-IODINE 10 %
SOLUTION, NON-ORAL TOPICAL
COMMUNITY

## 2024-06-26 RX ORDER — INSULIN ASPART 100 [IU]/ML
0-5 INJECTION, SOLUTION INTRAVENOUS; SUBCUTANEOUS
Status: DISCONTINUED | OUTPATIENT
Start: 2024-06-26 | End: 2024-07-11 | Stop reason: HOSPADM

## 2024-06-26 RX ORDER — IPRATROPIUM BROMIDE AND ALBUTEROL SULFATE 2.5; .5 MG/3ML; MG/3ML
3 SOLUTION RESPIRATORY (INHALATION) EVERY 4 HOURS PRN
Status: DISCONTINUED | OUTPATIENT
Start: 2024-06-26 | End: 2024-07-11 | Stop reason: HOSPADM

## 2024-06-26 RX ORDER — IBUPROFEN 200 MG
16 TABLET ORAL
Status: DISCONTINUED | OUTPATIENT
Start: 2024-06-26 | End: 2024-07-11 | Stop reason: HOSPADM

## 2024-06-26 RX ORDER — LEVOFLOXACIN 750 MG/1
750 TABLET ORAL DAILY
Status: ON HOLD | COMMUNITY
Start: 2024-06-19 | End: 2024-07-11 | Stop reason: HOSPADM

## 2024-06-26 RX ORDER — PANTOPRAZOLE SODIUM 40 MG/1
40 TABLET, DELAYED RELEASE ORAL DAILY
Status: DISCONTINUED | OUTPATIENT
Start: 2024-06-27 | End: 2024-06-26

## 2024-06-26 RX ORDER — ASPIRIN 325 MG
50000 TABLET, DELAYED RELEASE (ENTERIC COATED) ORAL
COMMUNITY
End: 2024-06-26

## 2024-06-26 RX ORDER — PANTOPRAZOLE SODIUM 40 MG/10ML
40 INJECTION, POWDER, LYOPHILIZED, FOR SOLUTION INTRAVENOUS 2 TIMES DAILY
Status: DISCONTINUED | OUTPATIENT
Start: 2024-06-26 | End: 2024-07-11 | Stop reason: HOSPADM

## 2024-06-26 RX ORDER — ONDANSETRON HYDROCHLORIDE 2 MG/ML
4 INJECTION, SOLUTION INTRAVENOUS EVERY 8 HOURS PRN
Status: DISCONTINUED | OUTPATIENT
Start: 2024-06-26 | End: 2024-07-11 | Stop reason: HOSPADM

## 2024-06-26 RX ORDER — ISOSORBIDE DINITRATE 10 MG/1
10 TABLET ORAL 3 TIMES DAILY
Status: DISCONTINUED | OUTPATIENT
Start: 2024-06-26 | End: 2024-07-11 | Stop reason: HOSPADM

## 2024-06-26 RX ORDER — ACETAMINOPHEN 325 MG/1
650 TABLET ORAL EVERY 4 HOURS PRN
Status: DISCONTINUED | OUTPATIENT
Start: 2024-06-26 | End: 2024-07-11 | Stop reason: HOSPADM

## 2024-06-26 RX ORDER — LANOLIN ALCOHOL/MO/W.PET/CERES
1 CREAM (GRAM) TOPICAL DAILY
Status: DISCONTINUED | OUTPATIENT
Start: 2024-06-26 | End: 2024-07-11 | Stop reason: HOSPADM

## 2024-06-26 RX ORDER — ARGININE/GLUTAMINE/CALCIUM BMB 7G-7G-1.5G
1 POWDER IN PACKET (EA) ORAL 2 TIMES DAILY
COMMUNITY

## 2024-06-26 RX ADMIN — GABAPENTIN 300 MG: 300 CAPSULE ORAL at 08:06

## 2024-06-26 RX ADMIN — POTASSIUM CHLORIDE 40 MEQ: 1500 TABLET, EXTENDED RELEASE ORAL at 01:06

## 2024-06-26 RX ADMIN — Medication 10 ML: at 05:06

## 2024-06-26 RX ADMIN — ISOSORBIDE DINITRATE 10 MG: 10 TABLET ORAL at 05:06

## 2024-06-26 RX ADMIN — FERROUS SULFATE TAB 325 MG (65 MG ELEMENTAL FE) 1 EACH: 325 (65 FE) TAB at 05:06

## 2024-06-26 RX ADMIN — ACETAMINOPHEN 650 MG: 325 TABLET ORAL at 08:06

## 2024-06-26 RX ADMIN — ISOSORBIDE DINITRATE 10 MG: 10 TABLET ORAL at 08:06

## 2024-06-26 RX ADMIN — PANTOPRAZOLE SODIUM 40 MG: 40 INJECTION, POWDER, LYOPHILIZED, FOR SOLUTION INTRAVENOUS at 05:06

## 2024-06-26 RX ADMIN — INSULIN ASPART 2 UNITS: 100 INJECTION, SOLUTION INTRAVENOUS; SUBCUTANEOUS at 06:06

## 2024-06-26 NOTE — SUBJECTIVE & OBJECTIVE
Past Medical History:   Diagnosis Date    Arthritis     legs    Bacteremia due to Gram-negative bacteria 3/23/2021    Diabetes mellitus     Diabetes mellitus, type 2     Hyperlipidemia     Hypertension     Osteomyelitis     Palliative care encounter 5/24/2023       Past Surgical History:   Procedure Laterality Date    ABOVE-KNEE AMPUTATION Left 3/27/2024    Procedure: AMPUTATION, ABOVE KNEE;  Surgeon: Adal Arellano MD;  Location: [facility] OR [address] [place] [address];  Service: Vascular;  Laterality: Left;    ANGIOGRAPHY OF LOWER EXTREMITY N/A 2/3/2021    Procedure: Angiogram Extremity Bilateral;  Surgeon: Ernst Chacko MD;  Location: [facility] OR [address] [place] [address];  Service: Peripheral Vascular;  Laterality: N/A;  7.4 mintues fluoroscopy time  816.15 mGy  170.17 Gycm2    AORTOGRAPHY WITH EXTREMITY RUNOFF Bilateral 2/3/2021    Procedure: AORTOGRAM, WITH EXTREMITY RUNOFF;  Surgeon: Ernst Chacko MD;  Location: [facility] OR [address] [place] [address];  Service: Peripheral Vascular;  Laterality: Bilateral;    DEBRIDEMENT OF FOOT Left 2/23/2021    Procedure: DEBRIDEMENT, LEFT HEEL;  Surgeon: Mayra Schroeder DPM;  Location: [facility] OR [address] [place] [address];  Service: Podiatry;  Laterality: Left;    FOOT AMPUTATION  October 2010    left high midfoot amputation    IMPLANTATION OF LEADLESS PACEMAKER N/A 10/12/2023    Procedure: [id number];  Surgeon: VANESSA Delatorre MD;  Location: [facility] EP LAB;  Service: Cardiology;  Laterality: N/A;  AVB, [place], EH, [place], [place] [id number]       Review of patient's allergies indicates:  No Known Allergies    No current facility-administered medications on file prior to encounter.     Current Outpatient Medications on File Prior to Encounter   Medication Sig    acetaminophen (TYLENOL) 325 MG tablet Take 650 mg by mouth every 6 (six) hours as needed for Pain.    acidophilus-pectin, citrus 100 million cell-10 mg Cap Take 1 capsule by mouth 2 (two) times a day.    apixaban (ELIQUIS) 5 mg Tab Take 1 tablet (5 mg total) by  mouth 2 (two) times daily.    arginine-glutamine-calcium HMB (HARITHA) 7-7-1.5 gram PwPk Take 1 packet by mouth 2 (two) times a day.    ascorbic acid, vitamin C, (VITAMIN C) 500 MG tablet Take 500 mg by mouth 2 (two) times daily.    ergocalciferol (ERGOCALCIFEROL) 50,000 unit Cap Take 1 capsule (50,000 Units total) by mouth every 7 days. for 10 doses (Patient taking differently: Take 50,000 Units by mouth Every Friday.)    ferrous sulfate 325 (65 FE) MG EC tablet Take 325 mg by mouth 2 (two) times daily.    furosemide (LASIX) 20 MG tablet Take 1 tablet (20 mg total) by mouth once daily.    gabapentin (NEURONTIN) 300 MG capsule Take 300 mg by mouth 2 (two) times daily.    insulin aspart U-100 (NOVOLOG) 100 unit/mL (3 mL) InPn pen Inject 5 Units into the skin 3 (three) times daily with meals.    insulin aspart U-100 (NOVOLOG) 100 unit/mL injection Inject 2-10 Units into the skin 3 (three) times daily before meals. 0-149=0  150-200: 2 units  201-250: 4 units  251-300: 6 units  301-350: 8 units  351-1000=10 units, NOTIFY MD NILDA KING U-100 INSULIN 100 unit/mL (3 mL) InPn pen Inject 12 Units into the skin 2 (two) times a day.    levoFLOXacin (LEVAQUIN) 750 MG tablet Take 750 mg by mouth once daily.    menthol-zinc oxide (CALMOSEPTINE) 0.44-20.6 % Oint Apply topically as needed.    multivitamin (THERAGRAN) per tablet Take 1 tablet by mouth once daily.    NIFEdipine (PROCARDIA-XL) 90 MG (OSM) 24 hr tablet Take 1 tablet (90 mg total) by mouth once daily.    oxyCODONE (ROXICODONE) 5 MG immediate release tablet Take 1 tablet (5 mg total) by mouth every 6 (six) hours as needed for Pain.    pantoprazole (PROTONIX) 40 MG tablet Take 40 mg by mouth once daily. Before breakfast    povidone-iodine (BETADINE) 10 % external solution Apply topically as needed for Wound Care.    SANTYL ointment Apply topically once daily. unknown    tamsulosin (FLOMAX) 0.4 mg Cap Take 0.4 mg by mouth once daily.    zinc sulfate (ZINCATE) 50 mg  "zinc (220 mg) capsule Take 220 mg by mouth every morning.    aspirin (ECOTRIN) 81 MG EC tablet Take 81 mg by mouth. unknown    BD AUTOSHIELD DUO PEN NEEDLE 30 gauge x 3/16" Ndle     BD ULTRA-FINE ADITYA PEN NEEDLE 32 gauge x 5/32" Ndle USE FOUR TIMES DAILY WITH MEALS AND NIGHTLY    blood sugar diagnostic (CONTOUR TEST STRIPS) Strp 1 strip by Misc.(Non-Drug; Combo Route) route 4 (four) times daily. Use to check blood glucose 4x daily    bumetanide (BUMEX) 0.5 MG Tab Take 0.5 mg by mouth daily as needed.    bumetanide (BUMEX) 1 MG tablet Take by mouth. unknown    fluconazole (DIFLUCAN) 100 MG tablet Take 100 mg by mouth. unknown    flucytosine (ANCOBON) 500 mg Cap Take by mouth. unknown    hydrALAZINE (APRESOLINE) 100 MG tablet Take by mouth. unknown    insulin detemir U-100, Levemir, (LEVEMIR FLEXPEN) 100 unit/mL (3 mL) InPn pen Inject 12 Units into the skin 2 (two) times daily. (Patient not taking: Reported on 6/26/2024)    isosorbide dinitrate (ISORDIL) 10 MG tablet Take 1 tablet (10 mg total) by mouth 3 (three) times daily. Hold until follow up with a provider    lancets (MICROLET LANCET) Misc 1 each by Misc.(Non-Drug; Combo Route) route 4 (four) times daily. Use to check blood sugars 4x daily    potassium chloride SA (K-DUR,KLOR-CON) 20 MEQ tablet Take 20 mEq by mouth 2 (two) times daily.    TRUE METRIX GLUCOSE METER Misc     [DISCONTINUED] B COMPLEX-VITAMIN B12 tablet Take 1 tablet by mouth once daily.    [DISCONTINUED] cholecalciferol, vitamin D3, 1,250 mcg (50,000 unit) capsule Take 50,000 Units by mouth Every Friday.    [DISCONTINUED] hydroCHLOROthiazide (HYDRODIURIL) 25 MG tablet Take 0.5 tablets (12.5 mg total) by mouth every Mon, Wed, Fri. Beginning on 7/4/2022     Family History       Problem Relation (Age of Onset)    Cancer Brother    Diabetes Mother, Sister    Heart disease Mother    Stroke Sister          Tobacco Use    Smoking status: Never    Smokeless tobacco: Never   Substance and Sexual Activity    " Alcohol use: No     Comment: occassional    Drug use: No    Sexual activity: Not Currently     Review of Systems   Constitutional:  Positive for activity change, appetite change and fatigue.   HENT:  Negative for trouble swallowing.    Respiratory:  Negative for cough.    Gastrointestinal:  Negative for diarrhea, nausea and vomiting.   Genitourinary:  Negative for hematuria.   Musculoskeletal:  Positive for arthralgias and myalgias.   Skin:  Positive for wound.   Neurological:  Positive for weakness.   Psychiatric/Behavioral:  Negative for confusion.      Objective:     Vital Signs (Most Recent):  Temp: 98 °F (36.7 °C) (06/26/24 1702)  Pulse: 71 (06/26/24 1702)  Resp: 18 (06/26/24 1702)  BP: 132/63 (06/26/24 1702)  SpO2: 98 % (06/26/24 1702) Vital Signs (24h Range):  Temp:  [98 °F (36.7 °C)-98.6 °F (37 °C)] 98 °F (36.7 °C)  Pulse:  [71-96] 71  Resp:  [16-19] 18  SpO2:  [94 %-100 %] 98 %  BP: (103-142)/(50-89) 132/63        There is no height or weight on file to calculate BMI.     Physical Exam  Vitals and nursing note reviewed.   Constitutional:       Appearance: He is obese. He is ill-appearing.   HENT:      Head: Normocephalic and atraumatic.      Mouth/Throat:      Mouth: Mucous membranes are moist.   Eyes:      Extraocular Movements: Extraocular movements intact.   Cardiovascular:      Rate and Rhythm: Normal rate.      Pulses: Normal pulses.      Heart sounds: Normal heart sounds.   Pulmonary:      Effort: Pulmonary effort is normal.      Breath sounds: Normal breath sounds.   Abdominal:      General: Bowel sounds are normal. There is no distension.      Palpations: Abdomen is soft.      Tenderness: There is no abdominal tenderness. There is no guarding.   Musculoskeletal:      Cervical back: Normal range of motion and neck supple.      Comments: Left AKA   Skin:     General: Skin is warm and dry.   Neurological:      Mental Status: He is alert and oriented to person, place, and time. Mental status is at  baseline.      Motor: No weakness.   Psychiatric:         Mood and Affect: Mood normal.         Behavior: Behavior normal.                Significant Labs: All pertinent labs within the past 24 hours have been reviewed.    Significant Imaging: I have reviewed all pertinent imaging results/findings within the past 24 hours.

## 2024-06-26 NOTE — HPI
Saji Castañeda is a 75 y/o M who has a pmh of Arthritis, Bacteremia due to Gram-negative bacteria, Diabetes mellitus, Diabetes mellitus, type 2, Hyperlipidemia, Hypertension, Osteomyelitis, and Palliative care encounter. presenting to the Emergency Department for referral. Patient was sent to the ED via EMS from [facility] for low blood count. Patient denies any chest pain, shortness on breath, weakness, fatigue, dizziness. His hemoglobin was 6.3--will order 1 u PRBC ordered. FOBT pending. Will admit to the [facility] service for further care.

## 2024-06-26 NOTE — PHARMACY MED REC
"  [facility]           Pharmacy  Admission Medication History     The home medication history was taken by Shaye Morales.      Medication history obtained from Medications listed below were obtained from: Nursing home    Based on information gathered for medication list, you may go to "Admission" then "Reconcile Home Medications" tabs to review and/or act upon those items.     The home medication list has been updated by the Pharmacy department.   Please read ALL comments highlighted in yellow.   Please address this information as you see fit.    Feel free to contact us if you have any questions or require assistance.        No current facility-administered medications on file prior to encounter.     Current Outpatient Medications on File Prior to Encounter   Medication Sig Dispense Refill    acetaminophen (TYLENOL) 325 MG tablet Take 650 mg by mouth every 6 (six) hours as needed for Pain.      acidophilus-pectin, citrus 100 million cell-10 mg Cap Take 1 capsule by mouth 2 (two) times a day.      apixaban (ELIQUIS) 5 mg Tab Take 1 tablet (5 mg total) by mouth 2 (two) times daily.      arginine-glutamine-calcium HMB (HARITHA) 7-7-1.5 gram PwPk Take 1 packet by mouth 2 (two) times a day.      ascorbic acid, vitamin C, (VITAMIN C) 500 MG tablet Take 500 mg by mouth 2 (two) times daily.      ergocalciferol (ERGOCALCIFEROL) 50,000 unit Cap Take 1 capsule (50,000 Units total) by mouth every 7 days. for 10 doses (Patient taking differently: Take 50,000 Units by mouth Every Friday.)      ferrous sulfate 325 (65 FE) MG EC tablet Take 325 mg by mouth 2 (two) times daily.      furosemide (LASIX) 20 MG tablet Take 1 tablet (20 mg total) by mouth once daily. 30 tablet 11    gabapentin (NEURONTIN) 300 MG capsule Take 300 mg by mouth 2 (two) times daily.      insulin aspart U-100 (NOVOLOG) 100 unit/mL (3 mL) InPn pen Inject 5 Units into the skin 3 (three) times daily with meals. 3 each 11    insulin aspart " "U-100 (NOVOLOG) 100 unit/mL injection Inject 2-10 Units into the skin 3 (three) times daily before meals. 0-149=0  150-200: 2 units  201-250: 4 units  251-300: 6 units  301-350: 8 units  351-1000=10 units, NOTIFY MD NILDA KING U-100 INSULIN 100 unit/mL (3 mL) InPn pen Inject 12 Units into the skin 2 (two) times a day.      levoFLOXacin (LEVAQUIN) 750 MG tablet Take 750 mg by mouth once daily.      menthol-zinc oxide (CALMOSEPTINE) 0.44-20.6 % Oint Apply topically as needed.      multivitamin (THERAGRAN) per tablet Take 1 tablet by mouth once daily.      NIFEdipine (PROCARDIA-XL) 90 MG (OSM) 24 hr tablet Take 1 tablet (90 mg total) by mouth once daily. 30 tablet 11    oxyCODONE (ROXICODONE) 5 MG immediate release tablet Take 1 tablet (5 mg total) by mouth every 6 (six) hours as needed for Pain. 30 tablet 0    pantoprazole (PROTONIX) 40 MG tablet Take 40 mg by mouth once daily. Before breakfast      povidone-iodine (BETADINE) 10 % external solution Apply topically as needed for Wound Care.      SANTYL ointment Apply topically once daily. unknown      tamsulosin (FLOMAX) 0.4 mg Cap Take 0.4 mg by mouth once daily.      zinc sulfate (ZINCATE) 50 mg zinc (220 mg) capsule Take 220 mg by mouth every morning.      aspirin (ECOTRIN) 81 MG EC tablet Take 81 mg by mouth. unknown      BD AUTOSHIELD DUO PEN NEEDLE 30 gauge x 3/16" Ndle       BD ULTRA-FINE ADITYA PEN NEEDLE 32 gauge x 5/32" Ndle USE FOUR TIMES DAILY WITH MEALS AND NIGHTLY 100 each 0    blood sugar diagnostic (CONTOUR TEST STRIPS) Strp 1 strip by Misc.(Non-Drug; Combo Route) route 4 (four) times daily. Use to check blood glucose 4x daily 100 strip 6    bumetanide (BUMEX) 0.5 MG Tab Take 0.5 mg by mouth daily as needed.      bumetanide (BUMEX) 1 MG tablet Take by mouth. unknown      fluconazole (DIFLUCAN) 100 MG tablet Take 100 mg by mouth. unknown      flucytosine (ANCOBON) 500 mg Cap Take by mouth. unknown      hydrALAZINE (APRESOLINE) 100 MG tablet Take " by mouth. unknown      isosorbide dinitrate (ISORDIL) 10 MG tablet Take 1 tablet (10 mg total) by mouth 3 (three) times daily. Hold until follow up with a provider      lancets (MICROLET LANCET) Misc 1 each by Misc.(Non-Drug; Combo Route) route 4 (four) times daily. Use to check blood sugars 4x daily  0    potassium chloride SA (K-DUR,KLOR-CON) 20 MEQ tablet Take 20 mEq by mouth 2 (two) times daily.      TRUE METRIX GLUCOSE METER [facility]          Please address this information as you see fit.  Feel free to contact us if you have any questions or require assistance.    Shaye Morales  [phone]                .

## 2024-06-26 NOTE — ED NOTES
C/o mid sternal chest tightness while receiving blood. Blood paused. Provider notified. Denies sob or other c/o.

## 2024-06-26 NOTE — ED TRIAGE NOTES
Sent from [facility] for evaluation of low blood counts. Denies c/o. Aaox3. Resp even unlabored. Skin warm and dry.

## 2024-06-26 NOTE — ASSESSMENT & PLAN NOTE
PVD (peripheral vascular disease)    -holding ASA and anticoagulation at this time. Will follow

## 2024-06-26 NOTE — NURSING
Report received from BEREKET Nicole in the ED. Patient received in stable condition in no acute distress. Bed weight and VS taken. Oriented to room and call light. Cardiac monitoring in place. Blood glucose level checked. Waffle mattress placed on bed. Safety maintained. Bed alarm set. Instructed to use call light for assistance. Will continue to monitor.

## 2024-06-26 NOTE — CARE UPDATE
Notified by the ED provider that patient began having CP while blood was transfusing. Blood was stopped.  Troponin ordered  Afib on tele  After transfusion was placed on hold patient states CP then resolved.  Will trend h/h  Consulting LYNN Melgar NP

## 2024-06-26 NOTE — H&P
[facility] Medicine  History & Physical    Patient Name: Saji Castañeda  MRN: [id number]  Patient Class: OP- Observation  Admission Date: 6/26/2024  Attending Physician: Aida Lim MD   Primary Care Provider: [facility] -         Patient information was obtained from patient, past medical records, and ER records.     Subjective:     Principal Problem:Anemia    Chief Complaint:   Chief Complaint   Patient presents with    Referral     Patient arrived via [facility] from [place]. Patient has low hemoglobin and hematocrit. Patient A&O.         HPI: Saji Castañeda is a 73 y/o M who has a pmh of Arthritis, Bacteremia due to Gram-negative bacteria, Diabetes mellitus, Diabetes mellitus, type 2, Hyperlipidemia, Hypertension, Osteomyelitis, and Palliative care encounter. presenting to the Emergency Department for referral. Patient was sent to the ED via EMS from [facility] for low blood count. Patient denies any chest pain, shortness on breath, weakness, fatigue, dizziness. His hemoglobin was 6.3--will order 1 u PRBC ordered. FOBT pending. Will admit to the [facility] service for further care.    Past Medical History:   Diagnosis Date    Arthritis     legs    Bacteremia due to Gram-negative bacteria 3/23/2021    Diabetes mellitus     Diabetes mellitus, type 2     Hyperlipidemia     Hypertension     Osteomyelitis     Palliative care encounter 5/24/2023       Past Surgical History:   Procedure Laterality Date    ABOVE-KNEE AMPUTATION Left 3/27/2024    Procedure: AMPUTATION, ABOVE KNEE;  Surgeon: Adal Arellano MD;  Location: [address];  Service: Vascular;  Laterality: Left;    ANGIOGRAPHY OF LOWER EXTREMITY N/A 2/3/2021    Procedure: Angiogram Extremity Bilateral;  Surgeon: Ernst Chacko MD;  Location: [facility] OR [address] [place] [address];  Service: Peripheral Vascular;  Laterality: N/A;  7.4 mintues fluoroscopy time  816.15 mGy  170.17 Gycm2    AORTOGRAPHY WITH  EXTREMITY RUNOFF Bilateral 2/3/2021    Procedure: AORTOGRAM, WITH EXTREMITY RUNOFF;  Surgeon: Ernst Chacko MD;  Location: [facility] OR 2ND FLR;  Service: Peripheral Vascular;  Laterality: Bilateral;    DEBRIDEMENT OF FOOT Left 2/23/2021    Procedure: DEBRIDEMENT, LEFT HEEL;  Surgeon: Mayra Schroeder DPM;  Location: [facility] OR 1ST FLR;  Service: Podiatry;  Laterality: Left;    FOOT AMPUTATION  October 2010    left high midfoot amputation    IMPLANTATION OF LEADLESS PACEMAKER N/A 10/12/2023    Procedure: [id number];  Surgeon: VANESSA Delatorre MD;  Location: [facility] EP LAB;  Service: Cardiology;  Laterality: N/A;  AVB, [place], EH, [place], [place] [id number]       Review of patient's allergies indicates:  No Known Allergies    No current facility-administered medications on file prior to encounter.     Current Outpatient Medications on File Prior to Encounter   Medication Sig    acetaminophen (TYLENOL) 325 MG tablet Take 650 mg by mouth every 6 (six) hours as needed for Pain.    acidophilus-pectin, citrus 100 million cell-10 mg Cap Take 1 capsule by mouth 2 (two) times a day.    apixaban (ELIQUIS) 5 mg Tab Take 1 tablet (5 mg total) by mouth 2 (two) times daily.    arginine-glutamine-calcium HMB (HARITHA) 7-7-1.5 gram PwPk Take 1 packet by mouth 2 (two) times a day.    ascorbic acid, vitamin C, (VITAMIN C) 500 MG tablet Take 500 mg by mouth 2 (two) times daily.    ergocalciferol (ERGOCALCIFEROL) 50,000 unit Cap Take 1 capsule (50,000 Units total) by mouth every 7 days. for 10 doses (Patient taking differently: Take 50,000 Units by mouth Every Friday.)    ferrous sulfate 325 (65 FE) MG EC tablet Take 325 mg by mouth 2 (two) times daily.    furosemide (LASIX) 20 MG tablet Take 1 tablet (20 mg total) by mouth once daily.    gabapentin (NEURONTIN) 300 MG capsule Take 300 mg by mouth 2 (two) times daily.    insulin aspart U-100 (NOVOLOG) 100 unit/mL (3 mL) InPn pen Inject 5 Units into the skin 3 (three) times  "daily with meals.    insulin aspart U-100 (NOVOLOG) 100 unit/mL injection Inject 2-10 Units into the skin 3 (three) times daily before meals. 0-149=0  150-200: 2 units  201-250: 4 units  251-300: 6 units  301-350: 8 units  351-1000=10 units, NOTIFY MD NILDA KING U-100 INSULIN 100 unit/mL (3 mL) InPn pen Inject 12 Units into the skin 2 (two) times a day.    levoFLOXacin (LEVAQUIN) 750 MG tablet Take 750 mg by mouth once daily.    menthol-zinc oxide (CALMOSEPTINE) 0.44-20.6 % Oint Apply topically as needed.    multivitamin (THERAGRAN) per tablet Take 1 tablet by mouth once daily.    NIFEdipine (PROCARDIA-XL) 90 MG (OSM) 24 hr tablet Take 1 tablet (90 mg total) by mouth once daily.    oxyCODONE (ROXICODONE) 5 MG immediate release tablet Take 1 tablet (5 mg total) by mouth every 6 (six) hours as needed for Pain.    pantoprazole (PROTONIX) 40 MG tablet Take 40 mg by mouth once daily. Before breakfast    povidone-iodine (BETADINE) 10 % external solution Apply topically as needed for Wound Care.    SANTYL ointment Apply topically once daily. unknown    tamsulosin (FLOMAX) 0.4 mg Cap Take 0.4 mg by mouth once daily.    zinc sulfate (ZINCATE) 50 mg zinc (220 mg) capsule Take 220 mg by mouth every morning.    aspirin (ECOTRIN) 81 MG EC tablet Take 81 mg by mouth. unknown    BD AUTOSHIELD DUO PEN NEEDLE 30 gauge x 3/16" Ndle     BD ULTRA-FINE ADITYA PEN NEEDLE 32 gauge x 5/32" Ndle USE FOUR TIMES DAILY WITH MEALS AND NIGHTLY    blood sugar diagnostic (CONTOUR TEST STRIPS) Strp 1 strip by Misc.(Non-Drug; Combo Route) route 4 (four) times daily. Use to check blood glucose 4x daily    bumetanide (BUMEX) 0.5 MG Tab Take 0.5 mg by mouth daily as needed.    bumetanide (BUMEX) 1 MG tablet Take by mouth. unknown    fluconazole (DIFLUCAN) 100 MG tablet Take 100 mg by mouth. unknown    flucytosine (ANCOBON) 500 mg Cap Take by mouth. unknown    hydrALAZINE (APRESOLINE) 100 MG tablet Take by mouth. unknown    insulin detemir U-100, " Levemir, (LEVEMIR FLEXPEN) 100 unit/mL (3 mL) InPn pen Inject 12 Units into the skin 2 (two) times daily. (Patient not taking: Reported on 6/26/2024)    isosorbide dinitrate (ISORDIL) 10 MG tablet Take 1 tablet (10 mg total) by mouth 3 (three) times daily. Hold until follow up with a provider    lancets (MICROLET LANCET) Misc 1 each by Misc.(Non-Drug; Combo Route) route 4 (four) times daily. Use to check blood sugars 4x daily    potassium chloride SA (K-DUR,KLOR-CON) 20 MEQ tablet Take 20 mEq by mouth 2 (two) times daily.    TRUE METRIX GLUCOSE METER Misc     [DISCONTINUED] B COMPLEX-VITAMIN B12 tablet Take 1 tablet by mouth once daily.    [DISCONTINUED] cholecalciferol, vitamin D3, 1,250 mcg (50,000 unit) capsule Take 50,000 Units by mouth Every Friday.    [DISCONTINUED] hydroCHLOROthiazide (HYDRODIURIL) 25 MG tablet Take 0.5 tablets (12.5 mg total) by mouth every Mon, Wed, Fri. Beginning on 7/4/2022     Family History       Problem Relation (Age of Onset)    Cancer Brother    Diabetes Mother, Sister    Heart disease Mother    Stroke Sister          Tobacco Use    Smoking status: Never    Smokeless tobacco: Never   Substance and Sexual Activity    Alcohol use: No     Comment: occassional    Drug use: No    Sexual activity: Not Currently     Review of Systems   Constitutional:  Positive for activity change, appetite change and fatigue.   HENT:  Negative for trouble swallowing.    Respiratory:  Negative for cough.    Gastrointestinal:  Negative for diarrhea, nausea and vomiting.   Genitourinary:  Negative for hematuria.   Musculoskeletal:  Positive for arthralgias and myalgias.   Skin:  Positive for wound.   Neurological:  Positive for weakness.   Psychiatric/Behavioral:  Negative for confusion.      Objective:     Vital Signs (Most Recent):  Temp: 98 °F (36.7 °C) (06/26/24 1702)  Pulse: 71 (06/26/24 1702)  Resp: 18 (06/26/24 1702)  BP: 132/63 (06/26/24 1702)  SpO2: 98 % (06/26/24 1702) Vital Signs (24h  Range):  Temp:  [98 °F (36.7 °C)-98.6 °F (37 °C)] 98 °F (36.7 °C)  Pulse:  [71-96] 71  Resp:  [16-19] 18  SpO2:  [94 %-100 %] 98 %  BP: (103-142)/(50-89) 132/63        There is no height or weight on file to calculate BMI.     Physical Exam  Vitals and nursing note reviewed.   Constitutional:       Appearance: He is obese. He is ill-appearing.   HENT:      Head: Normocephalic and atraumatic.      Mouth/Throat:      Mouth: Mucous membranes are moist.   Eyes:      Extraocular Movements: Extraocular movements intact.   Cardiovascular:      Rate and Rhythm: Normal rate.      Pulses: Normal pulses.      Heart sounds: Normal heart sounds.   Pulmonary:      Effort: Pulmonary effort is normal.      Breath sounds: Normal breath sounds.   Abdominal:      General: Bowel sounds are normal. There is no distension.      Palpations: Abdomen is soft.      Tenderness: There is no abdominal tenderness. There is no guarding.   Musculoskeletal:      Cervical back: Normal range of motion and neck supple.      Comments: Left AKA   Skin:     General: Skin is warm and dry.   Neurological:      Mental Status: He is alert and oriented to person, place, and time. Mental status is at baseline.      Motor: No weakness.   Psychiatric:         Mood and Affect: Mood normal.         Behavior: Behavior normal.                Significant Labs: All pertinent labs within the past 24 hours have been reviewed.    Significant Imaging: I have reviewed all pertinent imaging results/findings within the past 24 hours.  Assessment/Plan:     * Anemia  Patient's anemia is currently uncontrolled. Has 1 u PRBC ordered. Etiology likely d/t Iron deficiency  Current CBC reviewed-   Lab Results   Component Value Date    HGB 7.0 (L) 06/26/2024    HCT 22.7 (L) 06/26/2024     Monitor serial CBC and transfuse if patient becomes hemodynamically unstable, symptomatic or H/H drops below 7/21.    Wounds, multiple  -patient with wounds to his sacrum and heel  -wound care  "consulted  -Ulcer care ordered      Chronic anticoagulation  Holding OAC 2/2 GI bleed      Stage 3b chronic kidney disease  Creatine stable for now. BMP reviewed- noted CrCl cannot be calculated (Unknown ideal weight.). according to latest data. Based on current GFR, CKD stage is stage 2 - GFR 60-89.  Monitor UOP and serial BMP and adjust therapy as needed. Renally dose meds. Avoid nephrotoxic medications and procedures.    Paroxysmal atrial fibrillation  Patient with trial fibrillation which is uncontrolled currently with Calcium Channel Blocker. Patient is currently in atrial fibrillation.[id number] Score: 4. . Anticoagulation on hold 2/2 GI bleed.    Chronic deep vein thrombosis (DVT) of right upper extremity  -holding anticoagulation in the setting of GI bleed  -follow      Peripheral arterial disease  PVD (peripheral vascular disease)    -holding ASA and anticoagulation at this time. Will follow       Type 2 diabetes mellitus, with long-term current use of insulin    Patient's FSGs are uncontrolled due to hyperglycemia on current medication regimen.  Last A1c reviewed-   Lab Results   Component Value Date    HGBA1C 6.8 (H) 06/26/2024     Most recent fingerstick glucose reviewed- No results for input(s): "POCTGLUCOSE" in the last 24 hours.  Current correctional scale  Low  Maintain anti-hyperglycemic dose as follows-   Antihyperglycemics (From admission, onward)      Start     Stop Route Frequency Ordered    06/26/24 1552  insulin aspart U-100 pen 0-5 Units         -- SubQ Before meals & nightly PRN 06/26/24 1453          Hold Oral hypoglycemics while patient is in the hospital.      Essential hypertension  Chronic, uncontrolled. Latest blood pressure and vitals reviewed-     Temp:  [98 °F (36.7 °C)-98.6 °F (37 °C)]   Pulse:  [71-96]   Resp:  [16-19]   BP: (103-142)/(50-89)   SpO2:  [94 %-100 %] .   Home meds for hypertension were reviewed and noted below.   Hypertension Medications               furosemide " (LASIX) 20 MG tablet Take 1 tablet (20 mg total) by mouth once daily.    NIFEdipine (PROCARDIA-XL) 90 MG (OSM) 24 hr tablet Take 1 tablet (90 mg total) by mouth once daily.    bumetanide (BUMEX) 0.5 MG Tab Take 0.5 mg by mouth daily as needed.    bumetanide (BUMEX) 1 MG tablet Take by mouth. unknown    hydrALAZINE (APRESOLINE) 100 MG tablet Take by mouth. unknown    isosorbide dinitrate (ISORDIL) 10 MG tablet Take 1 tablet (10 mg total) by mouth 3 (three) times daily. Hold until follow up with a provider            While in the hospital, will manage blood pressure as follows; will resume meds as tolerated     Will utilize p.r.n. blood pressure medication only if patient's blood pressure greater than 160/100 and he develops symptoms such as worsening chest pain or shortness of breath.      VTE Risk Mitigation (From admission, onward)           Ordered     Reason for No Pharmacological VTE Prophylaxis  Once        Question:  Reasons:  Answer:  Risk of Bleeding    06/26/24 1453     IP VTE HIGH RISK PATIENT  Once         06/26/24 1453     Place sequential compression device  Until discontinued         06/26/24 1453                         On 06/26/2024, patient should be placed in hospital observation services under my care in collaboration with Dr. Aida Lim.           Lo Melgar NP  Department of Hospital Medicine  [place] - Emergency Dept

## 2024-06-26 NOTE — ASSESSMENT & PLAN NOTE
"  Patient's FSGs are uncontrolled due to hyperglycemia on current medication regimen.  Last A1c reviewed-   Lab Results   Component Value Date    HGBA1C 6.8 (H) 06/26/2024     Most recent fingerstick glucose reviewed- No results for input(s): "POCTGLUCOSE" in the last 24 hours.  Current correctional scale  Low  Maintain anti-hyperglycemic dose as follows-   Antihyperglycemics (From admission, onward)      Start     Stop Route Frequency Ordered    06/26/24 1552  insulin aspart U-100 pen 0-5 Units         -- SubQ Before meals & nightly PRN 06/26/24 1453          Hold Oral hypoglycemics while patient is in the hospital.    "

## 2024-06-26 NOTE — ASSESSMENT & PLAN NOTE
Patient's anemia is currently uncontrolled. Has 1 u PRBC ordered. Etiology likely d/t Iron deficiency  Current CBC reviewed-   Lab Results   Component Value Date    HGB 7.0 (L) 06/26/2024    HCT 22.7 (L) 06/26/2024     Monitor serial CBC and transfuse if patient becomes hemodynamically unstable, symptomatic or H/H drops below 7/21.

## 2024-06-26 NOTE — ASSESSMENT & PLAN NOTE
Creatine stable for now. BMP reviewed- noted CrCl cannot be calculated (Unknown ideal weight.). according to latest data. Based on current GFR, CKD stage is stage 2 - GFR 60-89.  Monitor UOP and serial BMP and adjust therapy as needed. Renally dose meds. Avoid nephrotoxic medications and procedures.

## 2024-06-26 NOTE — ASSESSMENT & PLAN NOTE
Chronic, uncontrolled. Latest blood pressure and vitals reviewed-     Temp:  [98 °F (36.7 °C)-98.6 °F (37 °C)]   Pulse:  [71-96]   Resp:  [16-19]   BP: (103-142)/(50-89)   SpO2:  [94 %-100 %] .   Home meds for hypertension were reviewed and noted below.   Hypertension Medications               furosemide (LASIX) 20 MG tablet Take 1 tablet (20 mg total) by mouth once daily.    NIFEdipine (PROCARDIA-XL) 90 MG (OSM) 24 hr tablet Take 1 tablet (90 mg total) by mouth once daily.    bumetanide (BUMEX) 0.5 MG Tab Take 0.5 mg by mouth daily as needed.    bumetanide (BUMEX) 1 MG tablet Take by mouth. unknown    hydrALAZINE (APRESOLINE) 100 MG tablet Take by mouth. unknown    isosorbide dinitrate (ISORDIL) 10 MG tablet Take 1 tablet (10 mg total) by mouth 3 (three) times daily. Hold until follow up with a provider            While in the hospital, will manage blood pressure as follows; will resume meds as tolerated     Will utilize p.r.n. blood pressure medication only if patient's blood pressure greater than 160/100 and he develops symptoms such as worsening chest pain or shortness of breath.

## 2024-06-26 NOTE — ED PROVIDER NOTES
Encounter Date: 6/26/2024       History     Chief Complaint   Patient presents with    Referral     Patient arrived via [facility] from [place]. Patient has low hemoglobin and hematocrit. Patient A&O.      Saji Castañeda is a 74 y.o. male who has a past medical history of Arthritis, Bacteremia due to Gram-negative bacteria, Diabetes mellitus, Diabetes mellitus, type 2, Hyperlipidemia, Hypertension, Osteomyelitis, and Palliative care encounter. presenting to the Emergency Department for referral.  Patient was sent to the ED via EMS from [facility] for low blood count. Patient is alert and oriented. Denies any hematochezia, melena, hemoptysis, hematuria. Patient denies any chest pain, shortness on breath, weakness, fatigue, dizziness.  He does have some pain to the wounds on his sacrum, dressings were changed this morning by wound care nurse.  Otherwise no complaint.  No fevers.        The history is provided by the patient.     Review of patient's allergies indicates:  No Known Allergies  Past Medical History:   Diagnosis Date    Arthritis     legs    Bacteremia due to Gram-negative bacteria 3/23/2021    Diabetes mellitus     Diabetes mellitus, type 2     Hyperlipidemia     Hypertension     Osteomyelitis     Palliative care encounter 5/24/2023     Past Surgical History:   Procedure Laterality Date    ABOVE-KNEE AMPUTATION Left 3/27/2024    Procedure: AMPUTATION, ABOVE KNEE;  Surgeon: Adal Arellano MD;  Location: [address];  Service: Vascular;  Laterality: Left;    ANGIOGRAPHY OF LOWER EXTREMITY N/A 2/3/2021    Procedure: Angiogram Extremity Bilateral;  Surgeon: Ernst Chacko MD;  Location: [address];  Service: Peripheral Vascular;  Laterality: N/A;  7.4 mintues fluoroscopy time  816.15 mGy  170.17 Gycm2    AORTOGRAPHY WITH EXTREMITY RUNOFF Bilateral 2/3/2021    Procedure: AORTOGRAM, WITH EXTREMITY RUNOFF;  Surgeon: Ernst Chacko MD;  Location: [facility] OR [address] [place] [address];   Service: Peripheral Vascular;  Laterality: Bilateral;    DEBRIDEMENT OF FOOT Left 2/23/2021    Procedure: DEBRIDEMENT, LEFT HEEL;  Surgeon: Mayra Schroeder DPM;  Location: [facility] OR [address] [place] [address];  Service: Podiatry;  Laterality: Left;    ESOPHAGOGASTRODUODENOSCOPY N/A 6/28/2024    Procedure: EGD (ESOPHAGOGASTRODUODENOSCOPY);  Surgeon: Adal Chandra MD;  Location: [facility];  Service: Gastroenterology;  Laterality: N/A;    FOOT AMPUTATION  October 2010    left high midfoot amputation    IMPLANTATION OF LEADLESS PACEMAKER N/A 10/12/2023    Procedure: [id number];  Surgeon: VANESSA Delatorre MD;  Location: [facility] EP LAB;  Service: Cardiology;  Laterality: N/A;  AVB, MICRA, EH, ANES, [place] [id number]     Family History   Problem Relation Name Age of Onset    Diabetes Mother      Heart disease Mother      Stroke Sister      Cancer Brother      Diabetes Sister       Social History     Tobacco Use    Smoking status: Never    Smokeless tobacco: Never   Substance Use Topics    Alcohol use: No     Comment: occassional    Drug use: No     Review of Systems   Constitutional:  Negative for chills and fever.   Respiratory:  Negative for shortness of breath.    Cardiovascular:  Negative for chest pain.   Gastrointestinal:  Negative for nausea and vomiting.   Genitourinary:  Positive for difficulty urinating (fung).   Musculoskeletal:         L AKA   Skin:  Positive for color change (sacral wounds).   Neurological:  Negative for speech difficulty, light-headedness and headaches.   Psychiatric/Behavioral:  Negative for agitation and confusion.        Physical Exam     Initial Vitals [06/26/24 1110]   BP Pulse Resp Temp SpO2   (!) 103/50 95 18 98.6 °F (37 °C) (!) 94 %      MAP       --         Physical Exam    Nursing note and vitals reviewed.  Constitutional: He appears well-developed and well-nourished. He is not diaphoretic. He is Obese .  Non-toxic appearance. No distress.   HENT:   Head: Normocephalic and  atraumatic.   Right Ear: External ear normal.   Left Ear: External ear normal.   Eyes: EOM are normal.   Neck: Neck supple.   Normal range of motion.  Cardiovascular:  Normal rate and regular rhythm.           Pulmonary/Chest: Breath sounds normal. No respiratory distress. He has no wheezes.   Abdominal: He exhibits no distension.   Genitourinary:    Genitourinary Comments: Sacral wounds redressed by wound care this morning     Musculoskeletal:      Cervical back: Normal range of motion and neck supple.      Comments: MARY SANCHEZ     Neurological: He is alert and oriented to person, place, and time. GCS score is 15. GCS eye subscore is 4. GCS verbal subscore is 5. GCS motor subscore is 6.   Skin: Skin is dry.   Psychiatric: He has a normal mood and affect. His behavior is normal. Judgment and thought content normal.         ED Course   Procedures  Labs Reviewed   CBC W/ AUTO DIFFERENTIAL - Abnormal; Notable for the following components:       Result Value    RBC 2.46 (*)     Hemoglobin 6.3 (*)     Hematocrit 20.6 (*)     MCH 25.6 (*)     MCHC 30.6 (*)     RDW 18.9 (*)     Platelets 524 (*)     MPV 8.6 (*)     Gran # (ANC) 8.3 (*)     Immature Grans (Abs) 0.05 (*)     Gran % 79.5 (*)     Lymph % 10.9 (*)     All other components within normal limits   COMPREHENSIVE METABOLIC PANEL - Abnormal; Notable for the following components:    Potassium 3.0 (*)     CO2 33 (*)     Glucose 233 (*)     Albumin 1.6 (*)     All other components within normal limits   HEMOGLOBIN A1C - Abnormal; Notable for the following components:    Hemoglobin A1C 6.8 (*)     Estimated Avg Glucose 148 (*)     All other components within normal limits   FERRITIN - Abnormal; Notable for the following components:    Ferritin 712 (*)     All other components within normal limits   CBC W/ AUTO DIFFERENTIAL - Abnormal; Notable for the following components:    RBC 2.76 (*)     Hemoglobin 7.0 (*)     Hematocrit 22.7 (*)     MCH 25.4 (*)     MCHC 30.8 (*)     RDW  18.8 (*)     Platelets 500 (*)     MPV 8.4 (*)     Gran % 78.7 (*)     Lymph % 11.1 (*)     All other components within normal limits    Narrative:     Collection has been rescheduled by KWReena at 06/26/2024 16:31 Reason:   Unable to collect x2 and on the first stick gor a flow, but it   stopped. Nurse Corey was notified.   IRON AND TIBC   FERRITIN   HEMOGLOBIN A1C   TROPONIN I    Narrative:     Collection has been rescheduled by [email or web address] at 06/26/2024 16:31 Reason:   Unable to collect x2 and on the first stick gor a flow, but it   stopped. Nurse Corey was notified.   POCT GLUCOSE, HAND-HELD DEVICE   TYPE & SCREEN   GROUP & RH   PREPARE RBC SOFT        ECG Results              EKG 12-lead (Final result)        Collection Time Result Time QRS Duration OHS QTC Calculation    06/26/24 16:02:42 06/27/24 17:08:25 90 474                     Final result by Interface, Lab In [facility] (06/27/24 17:08:28)                   Narrative:    Test Reason : D64.9,    Vent. Rate : 079 BPM     Atrial Rate : 098 BPM     P-R Int : 000 ms          QRS Dur : 090 ms      QT Int : 414 ms       P-R-T Axes : 064 048 052 degrees     QTc Int : 474 ms    Sinus rhythm with 2nd degree A-V block (Mobitz I)  Abnormal ECG  Confirmed by Elaine GOMEZ, Max MCELROY ([id number]) on 6/27/2024 5:08:23 PM    Referred By: [email or web address]   SELF           Confirmed By:Max Henry MD                                  Imaging Results    None          Medications   0.9%  NaCl infusion (for blood administration) (has no administration in time range)   sodium chloride 0.9% flush 10 mL (10 mLs Intravenous Given 7/11/24 0920)   albuterol-ipratropium 2.5 mg-0.5 mg/3 mL nebulizer solution 3 mL (has no administration in time range)   melatonin tablet 6 mg (has no administration in time range)   ondansetron disintegrating tablet 8 mg (has no administration in time range)   ondansetron injection 4 mg (has no administration in time range)   senna-docusate 8.6-50 mg per tablet 1 tablet (1 tablet  Oral Given 7/11/24 0921)   simethicone chewable tablet 80 mg (has no administration in time range)   aluminum-magnesium hydroxide-simethicone 200-200-20 mg/5 mL suspension 30 mL (has no administration in time range)   acetaminophen tablet 650 mg (650 mg Oral Given 7/2/24 1930)   glucose chewable tablet 16 g (has no administration in time range)   glucose chewable tablet 24 g (has no administration in time range)   glucagon (human recombinant) injection 1 mg (has no administration in time range)   insulin aspart U-100 pen 0-5 Units (1 Units Subcutaneous Given 7/10/24 2118)   dextrose 10% bolus 125 mL 125 mL (has no administration in time range)   dextrose 10% bolus 250 mL 250 mL (has no administration in time range)   ferrous sulfate tablet 1 each (1 each Oral Given 7/11/24 0917)   gabapentin capsule 300 mg (300 mg Oral Given 7/11/24 0917)   tamsulosin 24 hr capsule 0.4 mg (0.4 mg Oral Given 7/11/24 0915)   isosorbide dinitrate tablet 10 mg (10 mg Oral Given 7/11/24 0917)   pantoprazole injection 40 mg (40 mg Intravenous Given 7/10/24 2118)   0.9%  NaCl infusion (for blood administration) (has no administration in time range)   diphenhydrAMINE capsule 25 mg (25 mg Oral Given 6/27/24 1125)   furosemide injection 20 mg (has no administration in time range)   morphine injection 1 mg (1 mg Intravenous Given 7/9/24 1543)   HYDROcodone-acetaminophen  mg per tablet 1 tablet (1 tablet Oral Given 7/11/24 0223)   vancomycin - pharmacy to dose (has no administration in time range)   LIDOcaine (PF) 10 mg/ml (1%) injection 10 mg (10 mg Other Not Given 7/1/24 1200)   apixaban tablet 5 mg (5 mg Oral Given 7/11/24 0918)   aspirin EC tablet 81 mg (81 mg Oral Given 7/11/24 0917)   vancomycin 1,250 mg in D5W 250 mL IVPB (Vial-Mate) (0 mg Intravenous Stopped 7/10/24 1905)   0.9%  NaCl infusion (for blood administration) (has no administration in time range)   ceFEPIme (MAXIPIME) 2 g in D5W 100 mL IVPB (MB+) (0 g Intravenous  Stopped 7/11/24 0655)   NIFEdipine 24 hr tablet 60 mg (60 mg Oral Given 7/11/24 0917)   hydrocortisone-aloe vera 1 % cream ( Topical (Top) Given 7/11/24 0918)   potassium chloride SA CR tablet 40 mEq (40 mEq Oral Given 6/26/24 1312)   polyethylene glycol (GoLYTELY) solution (4,000 mLs Oral Given 6/27/24 1846)   vancomycin (VANCOCIN) 2,250 mg in D5W 500 mL IVPB (0 mg Intravenous Stopped 6/28/24 1530)   magnesium sulfate 2g in water 50mL IVPB (premix) (0 g Intravenous Stopped 6/29/24 1400)   mupirocin 2 % ointment ( Nasal Given 7/3/24 2123)   potassium bicarbonate disintegrating tablet 25 mEq (25 mEq Oral Given 7/1/24 1016)   potassium chloride SA CR tablet 40 mEq (40 mEq Oral Given 7/5/24 1425)   potassium chloride SA CR tablet 40 mEq (40 mEq Oral Given 7/7/24 1729)   alteplase injection 2 mg (2 mg Intra-Catheter Given 7/11/24 0918)     Medical Decision Making  74-year-old male presents from nursing home for low blood count.  He denies any symptoms except for some pain to his sacral wounds. No S/S of bleeding. The wounds were redressed by wound care this morning. BP soft on initial presentation, similar to last visit. No s/s of infection. Unknown outpatient lab values. CBC, CMP, type screen    Differential Diagnosis includes, but is not limited to:  Chronic anemia, blood loss anemia, lower GI bleed, upper GI bleed, UTI w hematuria, doubt sepsis      Amount and/or Complexity of Data Reviewed  Labs: ordered. Decision-making details documented in ED Course.  Radiology: ordered.    Risk  OTC drugs.  Prescription drug management.              Attending Attestation:         Attending Critical Care:   Critical Care Times:   Direct Patient Care (initial evaluation, reassessments, and time considering the case)................................................................10 minutes.   Additional History from reviewing old medical records or taking additional history from the family, EMS, PCP, etc.......................5  minutes.   Ordering, Reviewing, and Interpreting Diagnostic Studies...............................................................................................................5 minutes.   Documentation..................................................................................................................................................................................10 minutes.   Consultation with other Physicians. .................................................................................................................................................5 minutes.   Other..................................................................................................................................................................................................5 minutes.   ==============================================================  Total Critical Care Time - exclusive of procedural time: 40 minutes.  ==============================================================  Critical care was necessary to treat or prevent imminent or life-threatening deterioration of the following conditions: cardiac arrest and hypotension.   The following critical care procedures were done by me (see procedure notes): blood draw for specimens and pulse oximetry.   Critical care was time spent personally by me on the following activities: obtaining history from patient or relative, examination of patient, ordering lab, x-rays, and/or EKG, review of old charts, ordering and performing treatments and interventions and discussion with consultants.   Critical Care Condition: potentially life-threatening           ED Course as of 07/11/24 1059   Wed Jun 26, 2024   1221 Hemoglobin(!): 6.3 [CS]   1221 Hematocrit(!): 20.6 [CS]   1243 Comprehensive metabolic panel(!)  Hypokalemia, will replenish. Glucose elevated consistent with T2DM, no AG. Mild elevation bicarb. Normal renal and hepatic function [CS]   1437 Discussed patient with  [facility] hospitalist. 75 yo male presents for anemia requiring transfusion, unknown cause for drop in hgb. No s/s of bleeding. No recent scopes that I can see. Patient was similarly transfused May 14 during admission for urosepsis. Adding iron studies per hospitalist recommendation. Patient without complaint. Sacral wounds, dressed by wound care today.  [CS]   1608 ECG No STEMI NSR at 79, normal axis, no acute ischemic changes  [AT]   1612 Admit team notified of patient's chest pain. 8/10 substernal, non radiating. No SOB, rash, myalgias, fever, dizziness. Transfusion paused. EKG without acute ischemic changes. RN obtaining troponin [CS]      ED Course User Index  [AT] Shante Robledo MD  [CS] Nayeli Dugan PA-C                           Clinical Impression:  Final diagnoses:  [D64.9] Anemia (Primary)  [S31.000S] Wound of sacral region, sequela  [Z97.8] Sams catheter in place  [D64.9] Anemia requiring transfusions          ED Disposition Condition    Observation Stable                Nayeli Dugan PA-C  06/26/24 1600       Nayeli Dugan PA-C  06/26/24 1613       Nayeli Dugan PA-C  07/11/24 1059

## 2024-06-26 NOTE — ASSESSMENT & PLAN NOTE
Patient with trial fibrillation which is uncontrolled currently with Calcium Channel Blocker. Patient is currently in atrial fibrillation.DNLRT5ILOa Score: 4. . Anticoagulation on hold 2/2 GI bleed.

## 2024-06-27 LAB
ANION GAP SERPL CALC-SCNC: 9 MMOL/L (ref 8–16)
BASOPHILS # BLD AUTO: 0.04 K/UL (ref 0–0.2)
BASOPHILS # BLD AUTO: 0.05 K/UL (ref 0–0.2)
BASOPHILS # BLD AUTO: 0.05 K/UL (ref 0–0.2)
BASOPHILS NFR BLD: 0.4 % (ref 0–1.9)
BASOPHILS NFR BLD: 0.5 % (ref 0–1.9)
BASOPHILS NFR BLD: 0.6 % (ref 0–1.9)
BLD PROD TYP BPU: NORMAL
BLOOD UNIT EXPIRATION DATE: NORMAL
BLOOD UNIT TYPE CODE: 5100
BLOOD UNIT TYPE: NORMAL
BUN SERPL-MCNC: 10 MG/DL (ref 8–23)
CALCIUM SERPL-MCNC: 8.5 MG/DL (ref 8.7–10.5)
CHLORIDE SERPL-SCNC: 102 MMOL/L (ref 95–110)
CO2 SERPL-SCNC: 31 MMOL/L (ref 23–29)
CODING SYSTEM: NORMAL
CREAT SERPL-MCNC: 0.7 MG/DL (ref 0.5–1.4)
CROSSMATCH INTERPRETATION: NORMAL
DIFFERENTIAL METHOD BLD: ABNORMAL
DISPENSE STATUS: NORMAL
EOSINOPHIL # BLD AUTO: 0.4 K/UL (ref 0–0.5)
EOSINOPHIL NFR BLD: 4 % (ref 0–8)
EOSINOPHIL NFR BLD: 4.2 % (ref 0–8)
EOSINOPHIL NFR BLD: 4.3 % (ref 0–8)
ERYTHROCYTE [DISTWIDTH] IN BLOOD BY AUTOMATED COUNT: 18 % (ref 11.5–14.5)
ERYTHROCYTE [DISTWIDTH] IN BLOOD BY AUTOMATED COUNT: 18.8 % (ref 11.5–14.5)
ERYTHROCYTE [DISTWIDTH] IN BLOOD BY AUTOMATED COUNT: 18.9 % (ref 11.5–14.5)
EST. GFR  (NO RACE VARIABLE): >60 ML/MIN/1.73 M^2
FERRITIN SERPL-MCNC: 697 NG/ML (ref 20–300)
GLUCOSE SERPL-MCNC: 118 MG/DL (ref 70–110)
HCT VFR BLD AUTO: 20.3 % (ref 40–54)
HCT VFR BLD AUTO: 24.2 % (ref 40–54)
HCT VFR BLD AUTO: ABNORMAL % (ref 40–54)
HGB BLD-MCNC: 6.2 G/DL (ref 14–18)
HGB BLD-MCNC: 7.5 G/DL (ref 14–18)
HGB BLD-MCNC: ABNORMAL G/DL (ref 14–18)
IMM GRANULOCYTES # BLD AUTO: 0.02 K/UL (ref 0–0.04)
IMM GRANULOCYTES # BLD AUTO: 0.04 K/UL (ref 0–0.04)
IMM GRANULOCYTES # BLD AUTO: 0.07 K/UL (ref 0–0.04)
IMM GRANULOCYTES NFR BLD AUTO: 0.2 % (ref 0–0.5)
IMM GRANULOCYTES NFR BLD AUTO: 0.4 % (ref 0–0.5)
IMM GRANULOCYTES NFR BLD AUTO: 0.8 % (ref 0–0.5)
IRON SERPL-MCNC: 46 UG/DL (ref 45–160)
LYMPHOCYTES # BLD AUTO: 1.5 K/UL (ref 1–4.8)
LYMPHOCYTES # BLD AUTO: 1.5 K/UL (ref 1–4.8)
LYMPHOCYTES # BLD AUTO: 1.7 K/UL (ref 1–4.8)
LYMPHOCYTES NFR BLD: 16.3 % (ref 18–48)
LYMPHOCYTES NFR BLD: 17.9 % (ref 18–48)
LYMPHOCYTES NFR BLD: 18.4 % (ref 18–48)
MAGNESIUM SERPL-MCNC: 1.7 MG/DL (ref 1.6–2.6)
MCH RBC QN AUTO: 25.3 PG (ref 27–31)
MCH RBC QN AUTO: 25.4 PG (ref 27–31)
MCH RBC QN AUTO: 25.4 PG (ref 27–31)
MCHC RBC AUTO-ENTMCNC: 30.1 G/DL (ref 32–36)
MCHC RBC AUTO-ENTMCNC: 30.5 G/DL (ref 32–36)
MCHC RBC AUTO-ENTMCNC: 31 G/DL (ref 32–36)
MCV RBC AUTO: 82 FL (ref 82–98)
MCV RBC AUTO: 83 FL (ref 82–98)
MCV RBC AUTO: 84 FL (ref 82–98)
MONOCYTES # BLD AUTO: 0.5 K/UL (ref 0.3–1)
MONOCYTES # BLD AUTO: 0.7 K/UL (ref 0.3–1)
MONOCYTES # BLD AUTO: 0.7 K/UL (ref 0.3–1)
MONOCYTES NFR BLD: 6.3 % (ref 4–15)
MONOCYTES NFR BLD: 7.3 % (ref 4–15)
MONOCYTES NFR BLD: 7.6 % (ref 4–15)
NEUTROPHILS # BLD AUTO: 6 K/UL (ref 1.8–7.7)
NEUTROPHILS # BLD AUTO: 6.2 K/UL (ref 1.8–7.7)
NEUTROPHILS # BLD AUTO: 6.5 K/UL (ref 1.8–7.7)
NEUTROPHILS NFR BLD: 69.2 % (ref 38–73)
NEUTROPHILS NFR BLD: 70.7 % (ref 38–73)
NEUTROPHILS NFR BLD: 70.9 % (ref 38–73)
NRBC BLD-RTO: 0 /100 WBC
NUM UNITS TRANS PACKED RBC: NORMAL
OHS QRS DURATION: 90 MS
OHS QTC CALCULATION: 474 MS
PLATELET # BLD AUTO: 477 K/UL (ref 150–450)
PLATELET # BLD AUTO: 491 K/UL (ref 150–450)
PLATELET # BLD AUTO: 535 K/UL (ref 150–450)
PLATELET BLD QL SMEAR: ABNORMAL
PMV BLD AUTO: 8.3 FL (ref 9.2–12.9)
PMV BLD AUTO: 8.7 FL (ref 9.2–12.9)
PMV BLD AUTO: 8.8 FL (ref 9.2–12.9)
POCT GLUCOSE: 104 MG/DL (ref 70–110)
POCT GLUCOSE: 120 MG/DL (ref 70–110)
POCT GLUCOSE: 127 MG/DL (ref 70–110)
POCT GLUCOSE: 147 MG/DL (ref 70–110)
POTASSIUM SERPL-SCNC: 3.5 MMOL/L (ref 3.5–5.1)
RBC # BLD AUTO: 2.44 M/UL (ref 4.6–6.2)
RBC # BLD AUTO: 2.57 M/UL (ref 4.6–6.2)
RBC # BLD AUTO: 2.95 M/UL (ref 4.6–6.2)
SATURATED IRON: 43 % (ref 20–50)
SODIUM SERPL-SCNC: 142 MMOL/L (ref 136–145)
TOTAL IRON BINDING CAPACITY: 107 UG/DL (ref 250–450)
TRANSFERRIN SERPL-MCNC: 72 MG/DL (ref 200–375)
WBC # BLD AUTO: 8.42 K/UL (ref 3.9–12.7)
WBC # BLD AUTO: 8.97 K/UL (ref 3.9–12.7)
WBC # BLD AUTO: 9.18 K/UL (ref 3.9–12.7)

## 2024-06-27 PROCEDURE — 86920 COMPATIBILITY TEST SPIN: CPT | Performed by: NURSE PRACTITIONER

## 2024-06-27 PROCEDURE — 82728 ASSAY OF FERRITIN: CPT | Performed by: STUDENT IN AN ORGANIZED HEALTH CARE EDUCATION/TRAINING PROGRAM

## 2024-06-27 PROCEDURE — 85025 COMPLETE CBC W/AUTO DIFF WBC: CPT | Mod: 91 | Performed by: NURSE PRACTITIONER

## 2024-06-27 PROCEDURE — P9016 RBC LEUKOCYTES REDUCED: HCPCS | Performed by: NURSE PRACTITIONER

## 2024-06-27 PROCEDURE — 83540 ASSAY OF IRON: CPT | Performed by: STUDENT IN AN ORGANIZED HEALTH CARE EDUCATION/TRAINING PROGRAM

## 2024-06-27 PROCEDURE — C9113 INJ PANTOPRAZOLE SODIUM, VIA: HCPCS | Performed by: NURSE PRACTITIONER

## 2024-06-27 PROCEDURE — 25000003 PHARM REV CODE 250: Performed by: INTERNAL MEDICINE

## 2024-06-27 PROCEDURE — 36415 COLL VENOUS BLD VENIPUNCTURE: CPT | Performed by: STUDENT IN AN ORGANIZED HEALTH CARE EDUCATION/TRAINING PROGRAM

## 2024-06-27 PROCEDURE — 36415 COLL VENOUS BLD VENIPUNCTURE: CPT | Performed by: NURSE PRACTITIONER

## 2024-06-27 PROCEDURE — 63600175 PHARM REV CODE 636 W HCPCS: Performed by: NURSE PRACTITIONER

## 2024-06-27 PROCEDURE — A4216 STERILE WATER/SALINE, 10 ML: HCPCS | Performed by: NURSE PRACTITIONER

## 2024-06-27 PROCEDURE — 99223 1ST HOSP IP/OBS HIGH 75: CPT | Mod: GC,,, | Performed by: INTERNAL MEDICINE

## 2024-06-27 PROCEDURE — 83735 ASSAY OF MAGNESIUM: CPT | Performed by: NURSE PRACTITIONER

## 2024-06-27 PROCEDURE — 25000003 PHARM REV CODE 250: Performed by: NURSE PRACTITIONER

## 2024-06-27 PROCEDURE — 21400001 HC TELEMETRY ROOM

## 2024-06-27 PROCEDURE — 80048 BASIC METABOLIC PNL TOTAL CA: CPT | Performed by: NURSE PRACTITIONER

## 2024-06-27 RX ORDER — DIPHENHYDRAMINE HCL 25 MG
25 CAPSULE ORAL EVERY 6 HOURS PRN
Status: DISCONTINUED | OUTPATIENT
Start: 2024-06-27 | End: 2024-07-11 | Stop reason: HOSPADM

## 2024-06-27 RX ORDER — POLYETHYLENE GLYCOL 3350, SODIUM SULFATE ANHYDROUS, SODIUM BICARBONATE, SODIUM CHLORIDE, POTASSIUM CHLORIDE 236; 22.74; 6.74; 5.86; 2.97 G/4L; G/4L; G/4L; G/4L; G/4L
4000 POWDER, FOR SOLUTION ORAL ONCE
Status: COMPLETED | OUTPATIENT
Start: 2024-06-27 | End: 2024-06-27

## 2024-06-27 RX ORDER — HYDROCODONE BITARTRATE AND ACETAMINOPHEN 10; 325 MG/1; MG/1
1 TABLET ORAL EVERY 6 HOURS PRN
Status: DISCONTINUED | OUTPATIENT
Start: 2024-06-27 | End: 2024-07-11 | Stop reason: HOSPADM

## 2024-06-27 RX ORDER — FUROSEMIDE 10 MG/ML
20 INJECTION INTRAMUSCULAR; INTRAVENOUS
Status: DISCONTINUED | OUTPATIENT
Start: 2024-06-27 | End: 2024-07-11 | Stop reason: HOSPADM

## 2024-06-27 RX ORDER — MORPHINE SULFATE 2 MG/ML
1 INJECTION, SOLUTION INTRAMUSCULAR; INTRAVENOUS EVERY 6 HOURS PRN
Status: DISCONTINUED | OUTPATIENT
Start: 2024-06-27 | End: 2024-07-11 | Stop reason: HOSPADM

## 2024-06-27 RX ORDER — HYDROCODONE BITARTRATE AND ACETAMINOPHEN 500; 5 MG/1; MG/1
TABLET ORAL
Status: DISCONTINUED | OUTPATIENT
Start: 2024-06-27 | End: 2024-07-11 | Stop reason: HOSPADM

## 2024-06-27 RX ADMIN — ACETAMINOPHEN 650 MG: 325 TABLET ORAL at 05:06

## 2024-06-27 RX ADMIN — ISOSORBIDE DINITRATE 10 MG: 10 TABLET ORAL at 09:06

## 2024-06-27 RX ADMIN — DOCUSATE SODIUM AND SENNOSIDES 1 TABLET: 8.6; 5 TABLET, FILM COATED ORAL at 10:06

## 2024-06-27 RX ADMIN — FERROUS SULFATE TAB 325 MG (65 MG ELEMENTAL FE) 1 EACH: 325 (65 FE) TAB at 10:06

## 2024-06-27 RX ADMIN — MORPHINE SULFATE 1 MG: 2 INJECTION, SOLUTION INTRAMUSCULAR; INTRAVENOUS at 04:06

## 2024-06-27 RX ADMIN — POLYETHYLENE GLYCOL 3350, SODIUM SULFATE ANHYDROUS, SODIUM BICARBONATE, SODIUM CHLORIDE, POTASSIUM CHLORIDE 4000 ML: 236; 22.74; 6.74; 5.86; 2.97 POWDER, FOR SOLUTION ORAL at 06:06

## 2024-06-27 RX ADMIN — GABAPENTIN 300 MG: 300 CAPSULE ORAL at 10:06

## 2024-06-27 RX ADMIN — PANTOPRAZOLE SODIUM 40 MG: 40 INJECTION, POWDER, LYOPHILIZED, FOR SOLUTION INTRAVENOUS at 09:06

## 2024-06-27 RX ADMIN — PANTOPRAZOLE SODIUM 40 MG: 40 INJECTION, POWDER, LYOPHILIZED, FOR SOLUTION INTRAVENOUS at 10:06

## 2024-06-27 RX ADMIN — ISOSORBIDE DINITRATE 10 MG: 10 TABLET ORAL at 10:06

## 2024-06-27 RX ADMIN — DIPHENHYDRAMINE HYDROCHLORIDE 25 MG: 25 CAPSULE ORAL at 11:06

## 2024-06-27 RX ADMIN — Medication 10 ML: at 03:06

## 2024-06-27 RX ADMIN — TAMSULOSIN HYDROCHLORIDE 0.4 MG: 0.4 CAPSULE ORAL at 10:06

## 2024-06-27 RX ADMIN — DOCUSATE SODIUM AND SENNOSIDES 1 TABLET: 8.6; 5 TABLET, FILM COATED ORAL at 09:06

## 2024-06-27 RX ADMIN — GABAPENTIN 300 MG: 300 CAPSULE ORAL at 09:06

## 2024-06-27 RX ADMIN — ISOSORBIDE DINITRATE 10 MG: 10 TABLET ORAL at 03:06

## 2024-06-27 NOTE — ASSESSMENT & PLAN NOTE
Patient's FSGs are uncontrolled due to hyperglycemia on current medication regimen.  Last A1c reviewed-   Lab Results   Component Value Date    HGBA1C 6.8 (H) 06/26/2024     Most recent fingerstick glucose reviewed-   Recent Labs   Lab 06/26/24  1836 06/26/24  2043 06/27/24  0547   POCTGLUCOSE 212* 182* 147*     Current correctional scale  Low  Maintain anti-hyperglycemic dose as follows-   Antihyperglycemics (From admission, onward)    Start     Stop Route Frequency Ordered    06/26/24 1552  insulin aspart U-100 pen 0-5 Units         -- SubQ Before meals & nightly PRN 06/26/24 1453        Hold Oral hypoglycemics while patient is in the hospital.

## 2024-06-27 NOTE — PROGRESS NOTES
[facility] Medicine  Progress Note    Patient Name: Saji Castañeda  MRN: [id number]  Patient Class: OP- Observation   Admission Date: 6/26/2024  Length of Stay: 0 days  Attending Physician: Akira Li MD  Primary Care Provider: [facility] -        Subjective:     Principal Problem:Anemia        HPI:  Saji Castañeda is a 73 y/o M who has a pmh of Arthritis, Bacteremia due to Gram-negative bacteria, Diabetes mellitus, Diabetes mellitus, type 2, Hyperlipidemia, Hypertension, Osteomyelitis, and Palliative care encounter. presenting to the Emergency Department for referral. Patient was sent to the ED via EMS from [facility] for low blood count. Patient denies any chest pain, shortness on breath, weakness, fatigue, dizziness. His hemoglobin was 6.3--will order 1 u PRBC ordered. FOBT pending. Will admit to the [facility] service for further care.    Overview/Hospital Course:  No notes on file    Interval History:  Seen at the bedside this morning.  No distress noted.  He was noted with iron-deficiency anemia.  Attempted blood transfusion on yesterday in the ED however patient developed chest pain.  Blood transfusion was stopped.  Hemoglobin is less than 7.  Will attempt blood transfusion again on this morning---will premedicate prior to transfusion.    Review of Systems   Constitutional:  Positive for activity change, appetite change, fatigue and fever (Low-grade).   HENT:  Negative for trouble swallowing.    Respiratory:  Negative for cough.    Cardiovascular:  Negative for chest pain.   Gastrointestinal:  Negative for diarrhea, nausea and vomiting.   Genitourinary:  Negative for hematuria.   Musculoskeletal:  Positive for arthralgias and myalgias.   Skin:  Positive for wound.   Neurological:  Positive for weakness.   Psychiatric/Behavioral:  Negative for confusion.      Objective:     Vital Signs (Most Recent):  Temp: 98.2 °F (36.8 °C) (06/27/24 0727)  Pulse: (!) 55 (06/27/24  0727)  Resp: 20 (06/27/24 0727)  BP: (!) 129/59 (06/27/24 0727)  SpO2: 98 % (06/27/24 0727) Vital Signs (24h Range):  Temp:  [98 °F (36.7 °C)-99.3 °F (37.4 °C)] 98.2 °F (36.8 °C)  Pulse:  [52-96] 55  Resp:  [16-20] 20  SpO2:  [94 %-100 %] 98 %  BP: (103-142)/(50-89) 129/59     Weight: 87.5 kg (192 lb 14.4 oz)  Body mass index is 28.49 kg/m².     Physical Exam  Vitals and nursing note reviewed.   Constitutional:       Appearance: He is obese. He is ill-appearing.   HENT:      Head: Normocephalic and atraumatic.      Mouth/Throat:      Mouth: Mucous membranes are moist.   Eyes:      Extraocular Movements: Extraocular movements intact.   Cardiovascular:      Rate and Rhythm: Normal rate.      Pulses: Normal pulses.      Heart sounds: Normal heart sounds.   Pulmonary:      Effort: Pulmonary effort is normal.      Breath sounds: Normal breath sounds.   Abdominal:      General: Bowel sounds are normal. There is no distension.      Palpations: Abdomen is soft.      Tenderness: There is no abdominal tenderness. There is no guarding.   Musculoskeletal:      Cervical back: Normal range of motion and neck supple.      Comments: Left AKA   Skin:     General: Skin is warm and dry.      Capillary Refill: Capillary refill takes 2 to 3 seconds.   Neurological:      Mental Status: He is alert and oriented to person, place, and time. Mental status is at baseline.      Motor: No weakness.   Psychiatric:         Mood and Affect: Mood normal.         Behavior: Behavior normal.                Significant Labs: All pertinent labs within the past 24 hours have been reviewed.    Significant Imaging: I have reviewed all pertinent imaging results/findings within the past 24 hours.    Assessment/Plan:      * Anemia  Patient's anemia is currently uncontrolled. Has 1 u PRBC ordered. Etiology likely d/t Iron deficiency  Current CBC reviewed-   Lab Results   Component Value Date    HGB 6.5@RCBLD (L) 06/27/2024    HCT 21.6@RCBLD (L) 06/27/2024      Monitor serial CBC and transfuse if patient becomes hemodynamically unstable, symptomatic or H/H drops below 7/21.  -attempted blood transfusion yesterday however patient developed chest transfusion was stopped.  Will order 1 pack of packed red blood cells this morning and premedicate prior.  Orders for p.r.n. Lasix to be given in between units.  --consulted GI.    Wounds, multiple  -patient with wounds to his sacrum and heel  -wound care consulted  -Ulcer care ordered      Chronic anticoagulation  Holding OAC 2/2 GI bleed      Stage 3b chronic kidney disease  Creatine stable for now. BMP reviewed- noted Estimated Creatinine Clearance: 99.8 mL/min (based on SCr of 0.7 mg/dL). according to latest data. Based on current GFR, CKD stage is stage 2 - GFR 60-89.  Monitor UOP and serial BMP and adjust therapy as needed. Renally dose meds. Avoid nephrotoxic medications and procedures.    Paroxysmal atrial fibrillation  Patient with trial fibrillation which is uncontrolled currently with Calcium Channel Blocker. Patient is currently in atrial fibrillation.VEICF2NADr Score: 4. . Anticoagulation on hold 2/2 GI bleed.    Chronic deep vein thrombosis (DVT) of right upper extremity  -holding anticoagulation in the setting of GI bleed  -follow      Peripheral arterial disease  PVD (peripheral vascular disease)    -holding ASA and anticoagulation at this time. Will follow       Type 2 diabetes mellitus, with long-term current use of insulin    Patient's FSGs are uncontrolled due to hyperglycemia on current medication regimen.  Last A1c reviewed-   Lab Results   Component Value Date    HGBA1C 6.8 (H) 06/26/2024     Most recent fingerstick glucose reviewed-   Recent Labs   Lab 06/26/24  1836 06/26/24  2043 06/27/24  0547   POCTGLUCOSE 212* 182* 147*     Current correctional scale  Low  Maintain anti-hyperglycemic dose as follows-   Antihyperglycemics (From admission, onward)      Start     Stop Route Frequency Ordered    06/26/24  1552  insulin aspart U-100 pen 0-5 Units         -- SubQ Before meals & nightly PRN 06/26/24 1453          Hold Oral hypoglycemics while patient is in the hospital.      Essential hypertension  Chronic, uncontrolled. Latest blood pressure and vitals reviewed-     Temp:  [98 °F (36.7 °C)-99.3 °F (37.4 °C)]   Pulse:  [52-96]   Resp:  [16-20]   BP: (103-142)/(50-89)   SpO2:  [94 %-100 %] .   Home meds for hypertension were reviewed and noted below.   Hypertension Medications               furosemide (LASIX) 20 MG tablet Take 1 tablet (20 mg total) by mouth once daily.    NIFEdipine (PROCARDIA-XL) 90 MG (OSM) 24 hr tablet Take 1 tablet (90 mg total) by mouth once daily.    bumetanide (BUMEX) 0.5 MG Tab Take 0.5 mg by mouth daily as needed.    bumetanide (BUMEX) 1 MG tablet Take by mouth. unknown    hydrALAZINE (APRESOLINE) 100 MG tablet Take by mouth. unknown    isosorbide dinitrate (ISORDIL) 10 MG tablet Take 1 tablet (10 mg total) by mouth 3 (three) times daily. Hold until follow up with a provider            While in the hospital, will manage blood pressure as follows; will resume meds as tolerated     Will utilize p.r.n. blood pressure medication only if patient's blood pressure greater than 160/100 and he develops symptoms such as worsening chest pain or shortness of breath.      VTE Risk Mitigation (From admission, onward)           Ordered     Reason for No Pharmacological VTE Prophylaxis  Once        Question:  Reasons:  Answer:  Risk of Bleeding    06/26/24 1453     IP VTE HIGH RISK PATIENT  Once         06/26/24 1453     Place sequential compression device  Until discontinued         06/26/24 1453                    Discharge Planning   OXANA:      Code Status: Full Code   Is the patient medically ready for discharge?:     Reason for patient still in hospital (select all that apply): Laboratory test, Treatment, Imaging, Consult recommendations, and PT / OT recommendations                     Lo HERNANDEZ  SHAYNE Melgar  Department of Hospital Medicine   [facility]

## 2024-06-27 NOTE — CONSULTS
[facility] - Med Surg  Adult Nutrition  Consult Note    SUMMARY     Recommendations    Recommendation:  1. Advance diet when medically acceptable to 2000 calorie ADA Cardiac.   2. Encourage intake at meals as tolerated. 3. Add Kvng and beneprotein when diet advanced to promote wound healing.   4. Monitor weight/labs.   5. RD to follow to monitor po intake    Goals:  Diet will be advanced by RD follow up  Nutrition Goal Status: new  Communication of RD Recs: reviewed with RN (Malu)    Assessment and Plan  Nutrition Problem  Increased protein needs    Related to (etiology):   Wound healing    Signs and Symptoms (as evidenced by):   R toe, R heel, B buttocks, L ischial wounds     Interventions:  Collaboration with other providers  Increased protein diet    Nutrition Diagnosis Status:   New      Malnutrition Assessment  Weight Loss (Malnutrition):  (26% x 6 months)   Orbital Region (Subcutaneous Fat Loss): well nourished  Upper Arm Region (Subcutaneous Fat Loss): well nourished  Thoracic and Lumbar Region: well nourished   [place] Region (Muscle Loss): well nourished  Clavicle Bone Region (Muscle Loss): well nourished  Clavicle and Acromion Bone Region (Muscle Loss): well nourished  Scapular Bone Region (Muscle Loss): well nourished  Dorsal Hand (Muscle Loss): well nourished  Patellar Region (Muscle Loss): well nourished  Anterior Thigh Region (Muscle Loss): well nourished  Posterior Calf Region (Muscle Loss): well nourished       Reason for Assessment  Reason For Assessment: consult (sacral wound)  Diagnosis:  (anemia)  Relevant Medical History: arthritis, DM, HLD, osteomyelitis, HTN, pacemaker, L AKA  General Information Comments: Pt on Clear Liquid diet in preparation for EGD tomorrow. Reports food at his NH is terrible. Noted 72lb weight loss x 6 months (AKA in March). Encouraged increased protein intake when diet resumed to promote wound healing. Tomas 16-B buttocks, R toe, R heel, L greater trocanter, L ischial  "wounds. NFPE completed today -nourished-  Nutrition Discharge Planning: d/c diet to be determined    Nutrition Risk Screen  Nutrition Risk Screen: large or nonhealing wound, burn or pressure injury    Nutrition/Diet History  Food Preferences: no Restorationist or cultural food prefs identified  Spiritual, Cultural Beliefs, Hoahaoism Practices, Values that Affect Care: no  Factors Affecting Nutritional Intake: altered gastrointestinal function    Anthropometrics  Temp: 98.2 °F (36.8 °C)  Height Method: Stated  Height: 5' 9" (175.3 cm)  Height (inches): 69 in  Weight Method: Bed Scale  Weight: 87.5 kg (192 lb 14.4 oz)  Weight (lb): 192.9 lb  Ideal Body Weight (IBW), Male: 160 lb  % Ideal Body Weight, Male (lb): 120.56 %  BMI (Calculated): 28.5  BMI Grade: 25 - 29.9 - overweight  Usual Body Weight (UBW), k.7 kg (12/15)  % Usual Body Weight: 73.25  % Weight Change From Usual Weight: -26.9 %  Amputation %: 16  Total Amputation %: 16  Amputation Ideal Body Weight (IBW), Male (lb): 144 lb  Amputee BMI (kg/m2): 34.01 kg/m2     Lab/Procedures/Meds  Pertinent Labs Reviewed: reviewed  Pertinent Labs Comments: Glu 118H, Ca 8.5L, Alb 1.6L  Pertinent Medications Reviewed: reviewed  Pertinent Medications Comments: ferrous sulfate, gabapentin, pantoprazole, senna    Estimated/Assessed Needs  Weight Used For Calorie Calculations: 72.7 kg (160 lb 4.4 oz) (IBW pre amputation)  Energy Calorie Requirements (kcal): 2137 (35 kcal/kg IBW with AKA)  Energy Need Method: Kcal/kg  Protein Requirements: 79g (1.3g/kg IBW with amputation)  Weight Used For Protein Calculations: 72.7 kg (160 lb 4.4 oz) (IBW pre amputation)  Estimated Fluid Requirement Method: RDA Method  RDA Method (mL): 2137  CHO Requirement: 225g    Nutrition Prescription Ordered  Current Diet Order: Clear Liquid    Evaluation of Received Nutrient/Fluid Intake  I/O: 0/200  Energy Calories Required: not meeting needs  Protein Required: not meeting needs  Fluid Required: not " meeting needs  Comments: LBM 6/25  % Intake of Estimated Energy Needs: Other: CL diet just started  % Meal Intake: Other: CL diet just started    Nutrition Risk  Level of Risk/Frequency of Follow-up:  (2xweekly)     Monitor and Evaluation  Food and Nutrient Intake: food and beverage intake  Food and Nutrient Adminstration: diet order  Physical Activity and Function: nutrition-related ADLs and IADLs  Anthropometric Measurements: weight  Biochemical Data, Medical Tests and Procedures: electrolyte and renal panel  Nutrition-Focused Physical Findings: overall appearance     Nutrition Related Social Determinants of Health: SDOH: Other: receives meals from NH     Nutrition Follow-Up  RD Follow-up?: Yes

## 2024-06-27 NOTE — PLAN OF CARE
CM met with pt and sister at bedside to discuss discharge planning. He is a resident at [facility] in [place]. He has a L AKA but no prosthesis and is WC bound. Requires help with all ADL'S but is able to feed self. When medically ready to return to NH, hospital will provide transportation. CM added name and number to pt whiteboard. Pt made aware to contact CM with any questions or concerns. Cm will continue to follow and assist with any discharge needs.  Future Appointments   Date Time Provider Department Center   7/2/2024  1:30 PM Keerthi Hardin FNP [facility] UROLOGY [facility]   [facility] - Med Surg  Initial Discharge Assessment       Primary Care Provider: [facility] -    Admission Diagnosis: Anemia [D64.9]  Sams catheter in place [Z97.8]  Anemia requiring transfusions [D64.9]  Wound of sacral region, sequela [S31.000S]    Admission Date: 6/26/2024  Expected Discharge Date:     Transition of Care Barriers: (P) None    Payor: [employer] MEDICARE / Plan: [employer] HMO PPO SPECIAL NEEDS / Product Type: Medicare Advantage /     Extended Emergency Contact Information  Primary Emergency Contact: Kandy Castañeda   [place]  Mobile Phone: [phone]  Relation: Sister  Secondary Emergency Contact: January Nicholson   [place]  Mobile Phone: [phone]  Relation: Sister    Discharge Plan A: (P) Return to nursing home         [id number] - [place] - [address] [facility]  [facility] [place] [address]  Phone: [phone] Fax: [phone]    [facility] [place] [address]  [facility] [address]  Phone: [phone] Fax: [phone]      Initial Assessment (most recent)       Adult Discharge Assessment - 06/27/24 1552          Discharge Assessment    Assessment Type Discharge Planning Assessment     Confirmed/corrected address, phone number and insurance Yes      Confirmed Demographics Correct on Facesheet     Source of Information patient;family     Communicated OXANA with patient/caregiver Date not available/Unable to determine     Reason For Admission Anemia     People in Home facility resident     Do you expect to return to your current living situation? Yes     Prior to hospitilization cognitive status: Alert/Oriented     Current cognitive status: Alert/Oriented     Walking or Climbing Stairs Difficulty yes     Walking or Climbing Stairs ambulation difficulty, dependent     Mobility Management L AKA     Dressing/Bathing Difficulty yes     Dressing/Bathing bathing difficulty, assistance 1 person;bathing difficulty, requires equipment     Equipment Currently Used at Home wheelchair     Readmission within 30 days? Yes (P)      Patient currently being followed by outpatient case management? No (P)      Do you currently have service(s) that help you manage your care at home? No (P)      Do you take prescription medications? Yes (P)      Do you have prescription coverage? Yes (P)      Coverage [employer] (P)      Do you have any problems affording any of your prescribed medications? No (P)      Is the patient taking medications as prescribed? yes (P)      Who is going to help you get home at discharge? Hospital transportation (P)      How do you get to doctors appointments? health plan transportation (P)      Are you on dialysis? No (P)      Do you take coumadin? No (P)      Discharge Plan A Return to nursing home (P)      DME Needed Upon Discharge  none (P)      Discharge Plan discussed with: Patient;Sibling (P)      Name(s) and Number(s) Kandy Castañeda (sister) [phone] (P)      Transition of Care Barriers None (P)         Physical Activity    On average, how many days per week do you engage in moderate to strenuous exercise (like a brisk walk)? 0 days (P)      On average, how many minutes do you engage in exercise at this level? 0 min (P)         Financial Resource Strain     How hard is it for you to pay for the very basics like food, housing, medical care, and heating? Not hard at all (P)         Housing Stability    In the last 12 months, was there a time when you were not able to pay the mortgage or rent on time? No (P)      At any time in the past 12 months, were you homeless or living in a shelter (including now)? No (P)         Transportation Needs    Has the lack of transportation kept you from medical appointments, meetings, work or from getting things needed for daily living? No (P)    NH provided transportation to appts       Food Insecurity    Within the past 12 months, you worried that your food would run out before you got the money to buy more. Never true (P)      Within the past 12 months, the food you bought just didn't last and you didn't have money to get more. Never true (P)         Stress    Do you feel stress - tense, restless, nervous, or anxious, or unable to sleep at night because your mind is troubled all the time - these days? Not at all (P)         Social Isolation    How often do you feel lonely or isolated from those around you?  Rarely (P)         Alcohol Use    Q1: How often do you have a drink containing alcohol? Never (P)      Q2: How many drinks containing alcohol do you have on a typical day when you are drinking? Patient does not drink (P)      Q3: How often do you have six or more drinks on one occasion? Never (P)         Utilities    In the past 12 months has the electric, gas, oil, or water company threatened to shut off services in your home? No (P)         Health Literacy    How often do you need to have someone help you when you read instructions, pamphlets, or other written material from your doctor or pharmacy? Sometimes (P)

## 2024-06-27 NOTE — SUBJECTIVE & OBJECTIVE
Interval History:  Seen at the bedside this morning.  No distress noted.  He was noted with iron-deficiency anemia.  Attempted blood transfusion on yesterday in the ED however patient developed chest pain.  Blood transfusion was stopped.  Hemoglobin is less than 7.  Will attempt blood transfusion again on this morning---will premedicate prior to transfusion.    Review of Systems   Constitutional:  Positive for activity change, appetite change, fatigue and fever (Low-grade).   HENT:  Negative for trouble swallowing.    Respiratory:  Negative for cough.    Cardiovascular:  Negative for chest pain.   Gastrointestinal:  Negative for diarrhea, nausea and vomiting.   Genitourinary:  Negative for hematuria.   Musculoskeletal:  Positive for arthralgias and myalgias.   Skin:  Positive for wound.   Neurological:  Positive for weakness.   Psychiatric/Behavioral:  Negative for confusion.      Objective:     Vital Signs (Most Recent):  Temp: 98.2 °F (36.8 °C) (06/27/24 0727)  Pulse: (!) 55 (06/27/24 0727)  Resp: 20 (06/27/24 0727)  BP: (!) 129/59 (06/27/24 0727)  SpO2: 98 % (06/27/24 0727) Vital Signs (24h Range):  Temp:  [98 °F (36.7 °C)-99.3 °F (37.4 °C)] 98.2 °F (36.8 °C)  Pulse:  [52-96] 55  Resp:  [16-20] 20  SpO2:  [94 %-100 %] 98 %  BP: (103-142)/(50-89) 129/59     Weight: 87.5 kg (192 lb 14.4 oz)  Body mass index is 28.49 kg/m².     Physical Exam  Vitals and nursing note reviewed.   Constitutional:       Appearance: He is obese. He is ill-appearing.   HENT:      Head: Normocephalic and atraumatic.      Mouth/Throat:      Mouth: Mucous membranes are moist.   Eyes:      Extraocular Movements: Extraocular movements intact.   Cardiovascular:      Rate and Rhythm: Normal rate.      Pulses: Normal pulses.      Heart sounds: Normal heart sounds.   Pulmonary:      Effort: Pulmonary effort is normal.      Breath sounds: Normal breath sounds.   Abdominal:      General: Bowel sounds are normal. There is no distension.       Palpations: Abdomen is soft.      Tenderness: There is no abdominal tenderness. There is no guarding.   Musculoskeletal:      Cervical back: Normal range of motion and neck supple.      Comments: Left AKA   Skin:     General: Skin is warm and dry.      Capillary Refill: Capillary refill takes 2 to 3 seconds.   Neurological:      Mental Status: He is alert and oriented to person, place, and time. Mental status is at baseline.      Motor: No weakness.   Psychiatric:         Mood and Affect: Mood normal.         Behavior: Behavior normal.                Significant Labs: All pertinent labs within the past 24 hours have been reviewed.    Significant Imaging: I have reviewed all pertinent imaging results/findings within the past 24 hours.

## 2024-06-27 NOTE — ASSESSMENT & PLAN NOTE
Patient with trial fibrillation which is uncontrolled currently with Calcium Channel Blocker. Patient is currently in atrial fibrillation.[id number] Score: 4. . Anticoagulation on hold 2/2 GI bleed.

## 2024-06-27 NOTE — ASSESSMENT & PLAN NOTE
Patient's anemia is currently uncontrolled. Has 1 u PRBC ordered. Etiology likely d/t Iron deficiency  Current CBC reviewed-   Lab Results   Component Value Date    HGB 6.5@RCBLD (L) 06/27/2024    HCT 21.6@RCBLD (L) 06/27/2024     Monitor serial CBC and transfuse if patient becomes hemodynamically unstable, symptomatic or H/H drops below 7/21.  -attempted blood transfusion yesterday however patient developed chest transfusion was stopped.  Will order 1 pack of packed red blood cells this morning and premedicate prior.  Orders for p.r.n. Lasix to be given in between units.  --consulted GI.

## 2024-06-27 NOTE — PLAN OF CARE
"  Problem: Adult Inpatient Plan of Care  Goal: Plan of Care Review  Outcome: Progressing     VIRTUAL NURSE:  Cued into patient's room.  Permission received per patient to turn camera to view patient.  Introduced as VN for night shift that will be working with floor nurse and nursing assistant.  Educated patient on VN's role in patient care and  VIP model.  Plan of care reviewed with patient.  Education per flowsheet.   Informed patient that staff will round on them every 2 hours but to use call light for any other needs they may have; informed of fall risk and fall precautions.  Patient verbalized understanding.  Call light within reach; bed siderails up x3.  Opportunity given for questions and questions answered.  Admission assessment questions answered.  Instructed to call for assistance.  Will cont to monitor and intervene as needed.    Labs, notes, orders, and careplan initiated.       06/26/24 1941   Patient Request   Patient Requested c/o pain to "back end" 8/10   Nurse Notification   Bedside Nurse Notified? Yes   Name of Bedside Nurse ALVA Turner   Nurse Notfication Method Secure Chat   Nurse Notified Of Patient Request   Admission   Initial VN Admission Questions Complete   Communication Issues? Technical Issue   Shift   Virtual Nurse - Rounding Complete   Pain Management Interventions pain management plan reviewed with patient/caregiver   Virtual Nurse - Patient Verbalized Approval Of Camera Use;VN Rounding   Type of Frequent Check   Type Patient Rounds   Safety/Activity   Patient Rounds bed in low position;placement of personal items at bedside;bed wheels locked;call light in patient/parent reach;visualized patient;clutter free environment maintained   Safety Promotion/Fall Prevention assistive device/personal item within reach;bed alarm set;commode/urinal/bedpan at bedside;high risk medications identified;Fall Risk reviewed with patient/family;diversional activities provided;medications reviewed;nonskid " shoes/socks when out of bed;room near unit station;side rails raised x 3;supervised activity;Supervised toileting - stay within arms reach;instructed to call staff for mobility   Safety Precautions emergency equipment at bedside   Positioning   Body Position side-lying;left;head facing, left   Head of Bed (HOB) Positioning HOB at 60-90 degrees   Pain/Comfort/Sleep   Preferred Pain Scale number (Numeric Rating Pain Scale)   Comfort/Acceptable Pain Level 0   Pain Body Location other (see comments)  (buttocks)   Pain Rating (0-10): Rest 8   Sleep/Rest/Relaxation no problem identified;awake

## 2024-06-27 NOTE — PLAN OF CARE
Recommendation:  1. Advance diet when medically acceptable to 2000 calorie ADA Cardiac.   2. Encourage intake at meals as tolerated. 3. Add Kvng and beneprotein when diet advanced to promote wound healing.   4. Monitor weight/labs.   5. RD to follow to monitor po intake    Goals:  Diet will be advanced by RD follow up  Nutrition Goal Status: new

## 2024-06-27 NOTE — CONSULTS
"[facility]    CC: anemia    HPI 74 y.o. male who presented from nursing home after lab draw showed significant anemia. He reports feeling weak over the past week but overall reports being in normal state of health. He denies over GI bleeding.    Past Medical History  Past Medical History:   Diagnosis Date    Arthritis     legs    Bacteremia due to Gram-negative bacteria 3/23/2021    Diabetes mellitus     Diabetes mellitus, type 2     Hyperlipidemia     Hypertension     Osteomyelitis     Palliative care encounter 5/24/2023       Physical Examination  /72   Pulse (!) 53   Temp 98.2 °F (36.8 °C) (Oral)   Resp 20   Ht 5' 9" (1.753 m)   Wt 87.5 kg (192 lb 14.4 oz)   SpO2 97%   BMI 28.49 kg/m²   General appearance: alert, cooperative, no distress  HENT: Normocephalic, atraumatic. Anicteric sclera. No sublingual jaundice.  Lungs:  Normal work of breathing  Abdomen: Soft, non-tender; bowel sounds normoactive; no organomegaly  Skin: No jaundice    Labs:   Lab Results   Component Value Date    WBC 9.18 06/27/2024    HGB 6.5@RCBLD (L) 06/27/2024    HCT 21.6@RCBLD (L) 06/27/2024    MCV 84 06/27/2024     (H) 06/27/2024       Assessment and Recommendations: 74 y.o. male who presented from nursing home after lab draw showed significant anemia. He reports feeling weak over the past week but overall reports being in normal state of health. He denies overt GI bleeding. No prior endoscopy. There was delay in transfusion due to patient having active CP during prbc administration in the ED. Tolerating transfusion this morning without issue. On eliquis for PVD, held on admission by primary team.    Anemia  -Transfuse as indicated  -Maintain active type and screen  -Clear liquid diet today  -EGD/Colon tomorrow  -Prep ordered  -NPO at MN  -Check Ferritin, iron panel    I have spent >55 minutes including all elements of care    Jaylan Alfaro MD  Gastroenterology Fellow - [facility]    "

## 2024-06-27 NOTE — PLAN OF CARE
Pt. AAOx4. Pt. C/o pain after wound care, PRN ordered and administered. GI consult completed. Wound care completed at bedside. BS monitored. Tele monitored. Diet advanced until MN. Pt. NPO at MN for EGD in AM. Pt. Received 1 unit of blood during shift. Waffle mattress in place. Boot on foot. Pt. Turned q 2 using wedge. Bed alarm on and call light in reach. Pt. Instructed to call for assistance. Plan of care continued.   Problem: Adult Inpatient Plan of Care  Goal: Plan of Care Review  Outcome: Progressing     Problem: Adult Inpatient Plan of Care  Goal: Patient-Specific Goal (Individualized)  Outcome: Progressing     Problem: Adult Inpatient Plan of Care  Goal: Absence of Hospital-Acquired Illness or Injury  Outcome: Progressing     Problem: Adult Inpatient Plan of Care  Goal: Optimal Comfort and Wellbeing  Outcome: Progressing

## 2024-06-27 NOTE — PLAN OF CARE
A&O x4. NPO diet continued. Pt denies N/V, SOB, distress. Medications given per MAR. Vital signs stable throughout the night. Sinus arrhythmia with 2nd degree AV block (mobitz 1) on telemetry. Stayed in bed overnight. Weight shifting assistance provided. Catheter care provided. Safety precautions maintained. Bed alarm set. Call bell within reach. Patient encouraged to call for assistance.

## 2024-06-27 NOTE — ASSESSMENT & PLAN NOTE
Creatine stable for now. BMP reviewed- noted Estimated Creatinine Clearance: 99.8 mL/min (based on SCr of 0.7 mg/dL). according to latest data. Based on current GFR, CKD stage is stage 2 - GFR 60-89.  Monitor UOP and serial BMP and adjust therapy as needed. Renally dose meds. Avoid nephrotoxic medications and procedures.

## 2024-06-27 NOTE — CONSULTS
[facility] Surg  Wound Care    Patient Name:  Saji Castañeda   MRN:  [id number]  Date: 6/27/2024  Diagnosis: Anemia    History:     Past Medical History:   Diagnosis Date    Arthritis     legs    Bacteremia due to Gram-negative bacteria 3/23/2021    Diabetes mellitus     Diabetes mellitus, type 2     Hyperlipidemia     Hypertension     Osteomyelitis     Palliative care encounter 5/24/2023       Social History     Socioeconomic History    Marital status: Single   Tobacco Use    Smoking status: Never    Smokeless tobacco: Never   Substance and Sexual Activity    Alcohol use: No     Comment: occassional    Drug use: No    Sexual activity: Not Currently   Social History Narrative    Not currently working; lives with family     Social Determinants of Health     Financial Resource Strain: Low Risk  (6/27/2024)    Overall Financial Resource Strain (CARDIA)     Difficulty of Paying Living Expenses: Not hard at all   Food Insecurity: No Food Insecurity (6/27/2024)    Hunger Vital Sign     Worried About Running Out of Food in the Last Year: Never true     Ran Out of Food in the Last Year: Never true   Transportation Needs: No Transportation Needs (6/27/2024)    TRANSPORTATION NEEDS     Transportation : No   Physical Activity: Inactive (6/27/2024)    Exercise Vital Sign     Days of Exercise per Week: 0 days     Minutes of Exercise per Session: 0 min   Stress: No Stress Concern Present (6/27/2024)    [employer] [place] [employer] - Occupational Stress Questionnaire     Feeling of Stress : Not at all   Housing Stability: Low Risk  (6/27/2024)    Housing Stability Vital Sign     Unable to Pay for Housing in the Last Year: No     Homeless in the Last Year: No       Precautions:     Allergies as of 06/26/2024    (No Known Allergies)       WOC Assessment Details/Treatment     R lateral ankle- unstageable pressure injury with intact eschar- 1x0.5cm -no drainage      R heel- Stage 4 pressure injury- 6.5x4.5x0.1cm- bone  exposed- purple/maroon edges with maceration      L posterior thigh/gluteal fold- full thickness ulcerations due to pressure and moisture- unsure of history of the stage- slough/pink tissue- healing edges        Bilateral buttocks- full thickness pressure injury- [id number]- extends to gluteal cleft and outer R buttock- unsure of history of the stage- slough/pink tissue- healing edges- island of epithleium to R buttock        R buttock- Stage 4 pressure injury- present on admit- slough/granulation- moderate serosanguinous drainage-no odor [id number]- tunneling @12 o'clock of 8cm      Recommendations discussed with pt, nurse and Nighat NP:  - Pressure injury prevention interventions - waffle overlay and heel boot  - Vashe wet-to-dry to bilateral buttocks, R buttock, and R heel wound BID  - Mepilex border to L posterior thigh/gluteal fold 3x/week  - Podiatry consult for R heel wound depending on pt goals     06/27/2024

## 2024-06-27 NOTE — ASSESSMENT & PLAN NOTE
Chronic, uncontrolled. Latest blood pressure and vitals reviewed-     Temp:  [98 °F (36.7 °C)-99.3 °F (37.4 °C)]   Pulse:  [52-96]   Resp:  [16-20]   BP: (103-142)/(50-89)   SpO2:  [94 %-100 %] .   Home meds for hypertension were reviewed and noted below.   Hypertension Medications               furosemide (LASIX) 20 MG tablet Take 1 tablet (20 mg total) by mouth once daily.    NIFEdipine (PROCARDIA-XL) 90 MG (OSM) 24 hr tablet Take 1 tablet (90 mg total) by mouth once daily.    bumetanide (BUMEX) 0.5 MG Tab Take 0.5 mg by mouth daily as needed.    bumetanide (BUMEX) 1 MG tablet Take by mouth. unknown    hydrALAZINE (APRESOLINE) 100 MG tablet Take by mouth. unknown    isosorbide dinitrate (ISORDIL) 10 MG tablet Take 1 tablet (10 mg total) by mouth 3 (three) times daily. Hold until follow up with a provider            While in the hospital, will manage blood pressure as follows; will resume meds as tolerated     Will utilize p.r.n. blood pressure medication only if patient's blood pressure greater than 160/100 and he develops symptoms such as worsening chest pain or shortness of breath.

## 2024-06-28 ENCOUNTER — ANESTHESIA EVENT (OUTPATIENT)
Dept: ENDOSCOPY | Facility: HOSPITAL | Age: 74
End: 2024-06-28
Payer: MEDICARE

## 2024-06-28 ENCOUNTER — ANESTHESIA (OUTPATIENT)
Dept: ENDOSCOPY | Facility: HOSPITAL | Age: 74
End: 2024-06-28
Payer: MEDICARE

## 2024-06-28 PROBLEM — L89.614 PRESSURE INJURY OF RIGHT HEEL, STAGE 4: Status: ACTIVE | Noted: 2024-06-28

## 2024-06-28 PROBLEM — L89.304 PRESSURE INJURY OF BUTTOCK, STAGE 4: Status: ACTIVE | Noted: 2024-06-28

## 2024-06-28 PROBLEM — L89.500: Status: ACTIVE | Noted: 2024-06-28

## 2024-06-28 LAB
ANION GAP SERPL CALC-SCNC: 11 MMOL/L (ref 8–16)
BASOPHILS # BLD AUTO: 0.05 K/UL (ref 0–0.2)
BASOPHILS NFR BLD: 0.6 % (ref 0–1.9)
BUN SERPL-MCNC: 9 MG/DL (ref 8–23)
CALCIUM SERPL-MCNC: 8.4 MG/DL (ref 8.7–10.5)
CHLORIDE SERPL-SCNC: 100 MMOL/L (ref 95–110)
CO2 SERPL-SCNC: 30 MMOL/L (ref 23–29)
CREAT SERPL-MCNC: 0.7 MG/DL (ref 0.5–1.4)
DIFFERENTIAL METHOD BLD: ABNORMAL
EOSINOPHIL # BLD AUTO: 0.4 K/UL (ref 0–0.5)
EOSINOPHIL NFR BLD: 5.2 % (ref 0–8)
ERYTHROCYTE [DISTWIDTH] IN BLOOD BY AUTOMATED COUNT: 18.5 % (ref 11.5–14.5)
EST. GFR  (NO RACE VARIABLE): >60 ML/MIN/1.73 M^2
GLUCOSE SERPL-MCNC: 86 MG/DL (ref 70–110)
HCT VFR BLD AUTO: 24.8 % (ref 40–54)
HGB BLD-MCNC: 7.7 G/DL (ref 14–18)
IMM GRANULOCYTES # BLD AUTO: 0.05 K/UL (ref 0–0.04)
IMM GRANULOCYTES NFR BLD AUTO: 0.6 % (ref 0–0.5)
LYMPHOCYTES # BLD AUTO: 1.5 K/UL (ref 1–4.8)
LYMPHOCYTES NFR BLD: 17.6 % (ref 18–48)
MAGNESIUM SERPL-MCNC: 1.6 MG/DL (ref 1.6–2.6)
MCH RBC QN AUTO: 25.3 PG (ref 27–31)
MCHC RBC AUTO-ENTMCNC: 31 G/DL (ref 32–36)
MCV RBC AUTO: 82 FL (ref 82–98)
MONOCYTES # BLD AUTO: 0.6 K/UL (ref 0.3–1)
MONOCYTES NFR BLD: 7.4 % (ref 4–15)
NEUTROPHILS # BLD AUTO: 5.9 K/UL (ref 1.8–7.7)
NEUTROPHILS NFR BLD: 68.6 % (ref 38–73)
NRBC BLD-RTO: 0 /100 WBC
OB PNL STL: POSITIVE
PLATELET # BLD AUTO: 520 K/UL (ref 150–450)
PMV BLD AUTO: 8.8 FL (ref 9.2–12.9)
POCT GLUCOSE: 101 MG/DL (ref 70–110)
POCT GLUCOSE: 120 MG/DL (ref 70–110)
POCT GLUCOSE: 184 MG/DL (ref 70–110)
POCT GLUCOSE: 88 MG/DL (ref 70–110)
POTASSIUM SERPL-SCNC: 3.7 MMOL/L (ref 3.5–5.1)
RBC # BLD AUTO: 3.04 M/UL (ref 4.6–6.2)
SODIUM SERPL-SCNC: 141 MMOL/L (ref 136–145)
WBC # BLD AUTO: 8.54 K/UL (ref 3.9–12.7)

## 2024-06-28 PROCEDURE — 87040 BLOOD CULTURE FOR BACTERIA: CPT | Performed by: NURSE PRACTITIONER

## 2024-06-28 PROCEDURE — 63600175 PHARM REV CODE 636 W HCPCS: Performed by: NURSE ANESTHETIST, CERTIFIED REGISTERED

## 2024-06-28 PROCEDURE — 99223 1ST HOSP IP/OBS HIGH 75: CPT | Mod: ,,, | Performed by: NURSE PRACTITIONER

## 2024-06-28 PROCEDURE — 25000003 PHARM REV CODE 250: Performed by: STUDENT IN AN ORGANIZED HEALTH CARE EDUCATION/TRAINING PROGRAM

## 2024-06-28 PROCEDURE — 85025 COMPLETE CBC W/AUTO DIFF WBC: CPT | Performed by: NURSE PRACTITIONER

## 2024-06-28 PROCEDURE — 88305 TISSUE EXAM BY PATHOLOGIST: CPT | Performed by: PATHOLOGY

## 2024-06-28 PROCEDURE — D9220A PRA ANESTHESIA: Mod: ANES,,, | Performed by: STUDENT IN AN ORGANIZED HEALTH CARE EDUCATION/TRAINING PROGRAM

## 2024-06-28 PROCEDURE — 43239 EGD BIOPSY SINGLE/MULTIPLE: CPT | Performed by: INTERNAL MEDICINE

## 2024-06-28 PROCEDURE — 94761 N-INVAS EAR/PLS OXIMETRY MLT: CPT

## 2024-06-28 PROCEDURE — 27201012 HC FORCEPS, HOT/COLD, DISP: Performed by: INTERNAL MEDICINE

## 2024-06-28 PROCEDURE — 25000003 PHARM REV CODE 250: Performed by: NURSE PRACTITIONER

## 2024-06-28 PROCEDURE — 0DB78ZX EXCISION OF STOMACH, PYLORUS, VIA NATURAL OR ARTIFICIAL OPENING ENDOSCOPIC, DIAGNOSTIC: ICD-10-PCS | Performed by: INTERNAL MEDICINE

## 2024-06-28 PROCEDURE — 37000009 HC ANESTHESIA EA ADD 15 MINS: Performed by: INTERNAL MEDICINE

## 2024-06-28 PROCEDURE — 36415 COLL VENOUS BLD VENIPUNCTURE: CPT | Performed by: NURSE PRACTITIONER

## 2024-06-28 PROCEDURE — 80048 BASIC METABOLIC PNL TOTAL CA: CPT | Performed by: NURSE PRACTITIONER

## 2024-06-28 PROCEDURE — 21400001 HC TELEMETRY ROOM

## 2024-06-28 PROCEDURE — 25000003 PHARM REV CODE 250: Performed by: NURSE ANESTHETIST, CERTIFIED REGISTERED

## 2024-06-28 PROCEDURE — 63600175 PHARM REV CODE 636 W HCPCS: Performed by: STUDENT IN AN ORGANIZED HEALTH CARE EDUCATION/TRAINING PROGRAM

## 2024-06-28 PROCEDURE — 88305 TISSUE EXAM BY PATHOLOGIST: CPT | Mod: 26,,, | Performed by: PATHOLOGY

## 2024-06-28 PROCEDURE — A4216 STERILE WATER/SALINE, 10 ML: HCPCS | Performed by: NURSE PRACTITIONER

## 2024-06-28 PROCEDURE — 37000008 HC ANESTHESIA 1ST 15 MINUTES: Performed by: INTERNAL MEDICINE

## 2024-06-28 PROCEDURE — 88342 IMHCHEM/IMCYTCHM 1ST ANTB: CPT | Mod: 26,,, | Performed by: PATHOLOGY

## 2024-06-28 PROCEDURE — 82272 OCCULT BLD FECES 1-3 TESTS: CPT | Performed by: NURSE PRACTITIONER

## 2024-06-28 PROCEDURE — 99900035 HC TECH TIME PER 15 MIN (STAT)

## 2024-06-28 PROCEDURE — 99223 1ST HOSP IP/OBS HIGH 75: CPT | Mod: ,,, | Performed by: STUDENT IN AN ORGANIZED HEALTH CARE EDUCATION/TRAINING PROGRAM

## 2024-06-28 PROCEDURE — C9113 INJ PANTOPRAZOLE SODIUM, VIA: HCPCS | Performed by: NURSE PRACTITIONER

## 2024-06-28 PROCEDURE — 88342 IMHCHEM/IMCYTCHM 1ST ANTB: CPT | Performed by: PATHOLOGY

## 2024-06-28 PROCEDURE — 43239 EGD BIOPSY SINGLE/MULTIPLE: CPT | Mod: ,,, | Performed by: INTERNAL MEDICINE

## 2024-06-28 PROCEDURE — 83735 ASSAY OF MAGNESIUM: CPT | Performed by: NURSE PRACTITIONER

## 2024-06-28 PROCEDURE — 63600175 PHARM REV CODE 636 W HCPCS: Performed by: NURSE PRACTITIONER

## 2024-06-28 PROCEDURE — D9220A PRA ANESTHESIA: Mod: CRNA,,, | Performed by: NURSE ANESTHETIST, CERTIFIED REGISTERED

## 2024-06-28 RX ORDER — PROPOFOL 10 MG/ML
VIAL (ML) INTRAVENOUS
Status: DISCONTINUED | OUTPATIENT
Start: 2024-06-28 | End: 2024-06-28

## 2024-06-28 RX ORDER — PROPOFOL 10 MG/ML
VIAL (ML) INTRAVENOUS CONTINUOUS PRN
Status: DISCONTINUED | OUTPATIENT
Start: 2024-06-28 | End: 2024-06-28

## 2024-06-28 RX ORDER — LIDOCAINE HYDROCHLORIDE 20 MG/ML
INJECTION INTRAVENOUS
Status: DISCONTINUED | OUTPATIENT
Start: 2024-06-28 | End: 2024-06-28

## 2024-06-28 RX ORDER — DEXMEDETOMIDINE HYDROCHLORIDE 100 UG/ML
INJECTION, SOLUTION INTRAVENOUS
Status: DISCONTINUED | OUTPATIENT
Start: 2024-06-28 | End: 2024-06-28

## 2024-06-28 RX ADMIN — PIPERACILLIN SODIUM AND TAZOBACTAM SODIUM 4.5 G: 4; .5 INJECTION, POWDER, LYOPHILIZED, FOR SOLUTION INTRAVENOUS at 12:06

## 2024-06-28 RX ADMIN — GABAPENTIN 300 MG: 300 CAPSULE ORAL at 09:06

## 2024-06-28 RX ADMIN — VANCOMYCIN HYDROCHLORIDE 2250 MG: 500 INJECTION, POWDER, LYOPHILIZED, FOR SOLUTION INTRAVENOUS at 01:06

## 2024-06-28 RX ADMIN — TOPICAL ANESTHETIC 1 EACH: 200 SPRAY DENTAL; PERIODONTAL at 02:06

## 2024-06-28 RX ADMIN — Medication 10 ML: at 01:06

## 2024-06-28 RX ADMIN — PROPOFOL 40 MG: 10 INJECTION, EMULSION INTRAVENOUS at 02:06

## 2024-06-28 RX ADMIN — LIDOCAINE HYDROCHLORIDE 100 MG: 20 INJECTION, SOLUTION INTRAVENOUS at 02:06

## 2024-06-28 RX ADMIN — ACETAMINOPHEN 650 MG: 325 TABLET ORAL at 09:06

## 2024-06-28 RX ADMIN — PANTOPRAZOLE SODIUM 40 MG: 40 INJECTION, POWDER, LYOPHILIZED, FOR SOLUTION INTRAVENOUS at 09:06

## 2024-06-28 RX ADMIN — TAMSULOSIN HYDROCHLORIDE 0.4 MG: 0.4 CAPSULE ORAL at 09:06

## 2024-06-28 RX ADMIN — DEXMEDETOMIDINE HYDROCHLORIDE 8 MCG: 100 INJECTION, SOLUTION, CONCENTRATE INTRAVENOUS at 02:06

## 2024-06-28 RX ADMIN — PROPOFOL 150 MCG/KG/MIN: 10 INJECTION, EMULSION INTRAVENOUS at 02:06

## 2024-06-28 RX ADMIN — Medication 10 ML: at 09:06

## 2024-06-28 RX ADMIN — DOCUSATE SODIUM AND SENNOSIDES 1 TABLET: 8.6; 5 TABLET, FILM COATED ORAL at 09:06

## 2024-06-28 RX ADMIN — GLYCOPYRROLATE 0.2 MG: 0.2 INJECTION, SOLUTION INTRAMUSCULAR; INTRAVITREAL at 02:06

## 2024-06-28 RX ADMIN — ISOSORBIDE DINITRATE 10 MG: 10 TABLET ORAL at 09:06

## 2024-06-28 RX ADMIN — SODIUM CHLORIDE: 0.9 INJECTION, SOLUTION INTRAVENOUS at 02:06

## 2024-06-28 RX ADMIN — FERROUS SULFATE TAB 325 MG (65 MG ELEMENTAL FE) 1 EACH: 325 (65 FE) TAB at 09:06

## 2024-06-28 RX ADMIN — PIPERACILLIN SODIUM AND TAZOBACTAM SODIUM 4.5 G: 4; .5 INJECTION, POWDER, LYOPHILIZED, FOR SOLUTION INTRAVENOUS at 09:06

## 2024-06-28 NOTE — PROGRESS NOTES
Assisted nurse with wound dressing changes as directed.  Noted now that Podiatry saw pt RLE wounds with different treatment plan-will modify wound care orders accordingly.  Notified nurse and VANESSA Melgar NP

## 2024-06-28 NOTE — ASSESSMENT & PLAN NOTE
Pressure injury of buttock, stage 4   Pressure injury of right heel, stage 4     - Appreciate IP wound care  - Pressure injury prevention interventions - waffle overlay and heel boot  - Vashe wet-to-dry to bilateral buttocks, R buttock, and R heel wound BID  - Mepilex border to L posterior thigh/gluteal fold 3x/week  - Podiatry consult for R heel wound   --also noted with a bilateral buttock full thickness pressure injury  --Right heel and sacrum CT ordered to r/o OM

## 2024-06-28 NOTE — HPI
Sjai Castañeda is a 73 y/o M with Arthritis, Bacteremia due to Gram-negative bacteria, Diabetes mellitus, Diabetes mellitus, type 2, Hyperlipidemia, Hypertension, Osteomyelitis, bed bound, decubiti, AKA, and Palliative care encounter. Patient was sent from his NH due to anemia. He reports chronic wounds and transfers to  with max assist. No CP, SOB, palpitations, dizziness. Hgb 6.2 on admission.

## 2024-06-28 NOTE — ASSESSMENT & PLAN NOTE
Patient's FSGs are uncontrolled due to hyperglycemia on current medication regimen.  Last A1c reviewed-   Lab Results   Component Value Date    HGBA1C 6.8 (H) 06/26/2024     Most recent fingerstick glucose reviewed-   Recent Labs   Lab 06/27/24  1536 06/27/24  2046 06/28/24  0535 06/28/24  1124   POCTGLUCOSE 127* 104 88 101       Current correctional scale  Low  Maintain anti-hyperglycemic dose as follows-   Antihyperglycemics (From admission, onward)    Start     Stop Route Frequency Ordered    06/26/24 1552  insulin aspart U-100 pen 0-5 Units         -- SubQ Before meals & nightly PRN 06/26/24 1453        Hold Oral hypoglycemics while patient is in the hospital.

## 2024-06-28 NOTE — ASSESSMENT & PLAN NOTE
No recent arterial US on file   Given his bed bound status, malnutrition with albumin 1.6, Hgb 6.2 (unable to tolerate DAPT, DOAC at present), numerous pressure ulcers, AKA, feel he is not a revascularization candidate.   Medical management as tolerated  Local [facility]  Podiatry following  Would not be opposed to Palliative care consult for ongoing [facility] discussion

## 2024-06-28 NOTE — CONSULTS
[place] [facility] S[facility]  Cardiology  Consult Note    Patient Name: Saji Castañeda  MRN: [id number]  Admission Date: 6/26/2024  Hospital Length of Stay: 1 days  Code Status: Full Code   Attending Provider: Akira Li MD   Consulting Provider: Jac Agustin NP  Primary Care Physician: [facility] -  Principal Problem:Anemia    Patient information was obtained from patient, past medical records, and ER records.     Inpatient consult to Cardiology-[facility]  Consult performed by: Jac Agustin, NP  Consult ordered by: Barbara Orozco DPM        Subjective:     Chief Complaint:  Wounds     HPI:   Saji Castañeda is a 73 y/o M with Arthritis, Bacteremia due to Gram-negative bacteria, Diabetes mellitus, Diabetes mellitus, type 2, Hyperlipidemia, Hypertension, Osteomyelitis, bed bound, decubiti, AKA, and Palliative care encounter. Patient was sent from his NH due to anemia. He reports chronic wounds and transfers to  with max assist. No CP, SOB, palpitations, dizziness. Hgb 6.2 on admission.     Past Medical History:   Diagnosis Date    Arthritis     legs    Bacteremia due to Gram-negative bacteria 3/23/2021    Diabetes mellitus     Diabetes mellitus, type 2     Hyperlipidemia     Hypertension     Osteomyelitis     Palliative care encounter 5/24/2023       Past Surgical History:   Procedure Laterality Date    ABOVE-KNEE AMPUTATION Left 3/27/2024    Procedure: AMPUTATION, ABOVE KNEE;  Surgeon: Adal Arellano MD;  Location: [address];  Service: Vascular;  Laterality: Left;    ANGIOGRAPHY OF LOWER EXTREMITY N/A 2/3/2021    Procedure: Angiogram Extremity Bilateral;  Surgeon: Ernst Chacko MD;  Location: [address];  Service: Peripheral Vascular;  Laterality: N/A;  7.4 mintues fluoroscopy time  816.15 mGy  170.17 Gycm2    AORTOGRAPHY WITH EXTREMITY RUNOFF Bilateral 2/3/2021    Procedure: AORTOGRAM, WITH EXTREMITY RUNOFF;  Surgeon: Ernst Chacko MD;  Location: [address]  FLR;  Service: Peripheral Vascular;  Laterality: Bilateral;    DEBRIDEMENT OF FOOT Left 2/23/2021    Procedure: DEBRIDEMENT, LEFT HEEL;  Surgeon: Mayra Schroeder DPM;  Location: [facility] OR [facility] FLR;  Service: Podiatry;  Laterality: Left;    FOOT AMPUTATION  October 2010    left high midfoot amputation    IMPLANTATION OF LEADLESS PACEMAKER N/A 10/12/2023    Procedure: [id number];  Surgeon: VANESSA Delatorre MD;  Location: [facility] EP LAB;  Service: Cardiology;  Laterality: N/A;  AVB, MICRA, EH, ANES, [place] [id number]       Review of patient's allergies indicates:  No Known Allergies    No current facility-administered medications on file prior to encounter.     Current Outpatient Medications on File Prior to Encounter   Medication Sig    acetaminophen (TYLENOL) 325 MG tablet Take 650 mg by mouth every 6 (six) hours as needed for Pain.    acidophilus-pectin, citrus 100 million cell-10 mg Cap Take 1 capsule by mouth 2 (two) times a day.    apixaban (ELIQUIS) 5 mg Tab Take 1 tablet (5 mg total) by mouth 2 (two) times daily.    arginine-glutamine-calcium HMB (HARITHA) 7-7-1.5 gram PwPk Take 1 packet by mouth 2 (two) times a day.    ascorbic acid, vitamin C, (VITAMIN C) 500 MG tablet Take 500 mg by mouth 2 (two) times daily.    ergocalciferol (ERGOCALCIFEROL) 50,000 unit Cap Take 1 capsule (50,000 Units total) by mouth every 7 days. for 10 doses (Patient taking differently: Take 50,000 Units by mouth Every Friday.)    ferrous sulfate 325 (65 FE) MG EC tablet Take 325 mg by mouth 2 (two) times daily.    furosemide (LASIX) 20 MG tablet Take 1 tablet (20 mg total) by mouth once daily.    gabapentin (NEURONTIN) 300 MG capsule Take 300 mg by mouth 2 (two) times daily.    insulin aspart U-100 (NOVOLOG) 100 unit/mL (3 mL) InPn pen Inject 5 Units into the skin 3 (three) times daily with meals.    insulin aspart U-100 (NOVOLOG) 100 unit/mL injection Inject 2-10 Units into the skin 3 (three) times daily before meals.  "0-149=0  150-200: 2 units  201-250: 4 units  251-300: 6 units  301-350: 8 units  351-1000=10 units, NOTIFY MD NILDA KING U-100 INSULIN 100 unit/mL (3 mL) InPn pen Inject 12 Units into the skin 2 (two) times a day.    levoFLOXacin (LEVAQUIN) 750 MG tablet Take 750 mg by mouth once daily.    menthol-zinc oxide (CALMOSEPTINE) 0.44-20.6 % Oint Apply topically as needed.    multivitamin (THERAGRAN) per tablet Take 1 tablet by mouth once daily.    NIFEdipine (PROCARDIA-XL) 90 MG (OSM) 24 hr tablet Take 1 tablet (90 mg total) by mouth once daily.    oxyCODONE (ROXICODONE) 5 MG immediate release tablet Take 1 tablet (5 mg total) by mouth every 6 (six) hours as needed for Pain.    pantoprazole (PROTONIX) 40 MG tablet Take 40 mg by mouth once daily. Before breakfast    povidone-iodine (BETADINE) 10 % external solution Apply topically as needed for Wound Care.    SANTYL ointment Apply topically once daily. unknown    tamsulosin (FLOMAX) 0.4 mg Cap Take 0.4 mg by mouth once daily.    zinc sulfate (ZINCATE) 50 mg zinc (220 mg) capsule Take 220 mg by mouth every morning.    aspirin (ECOTRIN) 81 MG EC tablet Take 81 mg by mouth. unknown    BD AUTOSHIELD DUO PEN NEEDLE 30 gauge x 3/16" Ndle     BD ULTRA-FINE ADITYA PEN NEEDLE 32 gauge x 5/32" Ndle USE FOUR TIMES DAILY WITH MEALS AND NIGHTLY    blood sugar diagnostic (CONTOUR TEST STRIPS) Strp 1 strip by Misc.(Non-Drug; Combo Route) route 4 (four) times daily. Use to check blood glucose 4x daily    fluconazole (DIFLUCAN) 100 MG tablet Take 100 mg by mouth. unknown    flucytosine (ANCOBON) 500 mg Cap Take by mouth. unknown    hydrALAZINE (APRESOLINE) 100 MG tablet Take by mouth. unknown    lancets (MICROLET LANCET) Misc 1 each by Misc.(Non-Drug; Combo Route) route 4 (four) times daily. Use to check blood sugars 4x daily    TRUE METRIX GLUCOSE METER Misc     [DISCONTINUED] bumetanide (BUMEX) 0.5 MG Tab Take 0.5 mg by mouth daily as needed.    [DISCONTINUED] bumetanide (BUMEX) 1 " MG tablet Take by mouth. unknown    [DISCONTINUED] hydroCHLOROthiazide (HYDRODIURIL) 25 MG tablet Take 0.5 tablets (12.5 mg total) by mouth every Mon, Wed, Fri. Beginning on 7/4/2022    [DISCONTINUED] insulin detemir U-100, Levemir, (LEVEMIR FLEXPEN) 100 unit/mL (3 mL) InPn pen Inject 12 Units into the skin 2 (two) times daily. (Patient not taking: Reported on 6/26/2024)    [DISCONTINUED] isosorbide dinitrate (ISORDIL) 10 MG tablet Take 1 tablet (10 mg total) by mouth 3 (three) times daily. Hold until follow up with a provider    [DISCONTINUED] potassium chloride SA (K-DUR,KLOR-CON) 20 MEQ tablet Take 20 mEq by mouth 2 (two) times daily.     Family History       Problem Relation (Age of Onset)    Cancer Brother    Diabetes Mother, Sister    Heart disease Mother    Stroke Sister          Tobacco Use    Smoking status: Never    Smokeless tobacco: Never   Substance and Sexual Activity    Alcohol use: No     Comment: occassional    Drug use: No    Sexual activity: Not Currently     Review of Systems   Constitutional: Positive for malaise/fatigue. Negative for diaphoresis.   Cardiovascular:  Negative for chest pain, near-syncope, orthopnea, palpitations, paroxysmal nocturnal dyspnea and syncope.   Skin:  Positive for poor wound healing.   Neurological:  Positive for weakness.     Objective:     Vital Signs (Most Recent):  Temp: 99.2 °F (37.3 °C) (06/28/24 0714)  Pulse: 76 (06/28/24 1200)  Resp: 20 (06/28/24 1124)  BP: 133/62 (06/28/24 1124)  SpO2: 98 % (06/28/24 1124) Vital Signs (24h Range):  Temp:  [98.4 °F (36.9 °C)-99.4 °F (37.4 °C)] 99.2 °F (37.3 °C)  Pulse:  [57-94] 76  Resp:  [20] 20  SpO2:  [97 %-100 %] 98 %  BP: (113-147)/(57-70) 133/62     Weight: 87.5 kg (192 lb 14.4 oz)  Body mass index is 28.49 kg/m².    SpO2: 98 %         Intake/Output Summary (Last 24 hours) at 6/28/2024 1259  Last data filed at 6/28/2024 0511  Gross per 24 hour   Intake 825 ml   Output 1000 ml   Net -175 ml       Lines/Drains/Airways   "     Drain  Duration                  Urethral Catheter 06/26/24 1815 1 day              Peripheral Intravenous Line  Duration                  Peripheral IV - Single Lumen 06/26/24 1645 20 G Anterior;Left Upper Arm 1 day                     Physical Exam  HENT:      Head: Atraumatic.   Eyes:      General:         Right eye: No discharge.         Left eye: No discharge.   Cardiovascular:      Rate and Rhythm: Normal rate and regular rhythm.      Heart sounds: Murmur heard.   Pulmonary:      Effort: Pulmonary effort is normal.      Breath sounds: No rales.   Abdominal:      General: Bowel sounds are normal.   Musculoskeletal:         General: Deformity present.   Skin:     General: Skin is warm.   Neurological:      Mental Status: He is alert. Mental status is at baseline.          Significant Labs: BMP:   Recent Labs   Lab 06/27/24  0527 06/28/24  0523   * 86    141   K 3.5 3.7    100   CO2 31* 30*   BUN 10 9   CREATININE 0.7 0.7   CALCIUM 8.5* 8.4*   MG 1.7 1.6   , CMP   Recent Labs   Lab 06/27/24  0527 06/28/24  0523    141   K 3.5 3.7    100   CO2 31* 30*   * 86   BUN 10 9   CREATININE 0.7 0.7   CALCIUM 8.5* 8.4*   ANIONGAP 9 11   , CBC   Recent Labs   Lab 06/27/24  0805 06/27/24  1405 06/28/24  0523   WBC 9.18 8.42 8.54   HGB 6.5@RCBLD* 7.5* 7.7*   HCT 21.6@RCBLD* 24.2* 24.8*   * 491* 520*   , INR No results for input(s): "INR", "PROTIME" in the last 48 hours., Lipid Panel No results for input(s): "CHOL", "HDL", "LDLCALC", "TRIG", "CHOLHDL" in the last 48 hours., Troponin   Recent Labs   Lab 06/26/24  1720   TROPONINI 0.010   , and All pertinent lab results from the last 24 hours have been reviewed.    Significant Imaging: Echocardiogram: Transthoracic echo (TTE) complete (Cupid Only):   Results for orders placed or performed during the hospital encounter of 03/05/24   Echo   Result Value Ref Range    RA Width 4.87 cm    LA volume (mod) 113.86 cm3    Left Atrium Major " Axis 7.02 cm    Left Atrium Minor Axis 6.95 cm    RA Major Axis 6.20 cm    LV Diastolic Volume 129.01 mL    LV Systolic Volume 49.46 mL    MV Peak A Dl 0.87 m/s    MV stenosis pressure 1/2 time 58.28 ms    TR Max Dl 3.19 m/s    MV Peak E Dl 1.40 m/s    Ao VTI 31.53 cm    Ao peak dl 1.49 m/s    LVOT peak VTI 20.20 cm    LVOT peak dl 0.93 m/s    LVOT diameter 2.18 cm    IVRT 62.80 msec    E wave deceleration time 200.97 msec    AV mean gradient 6 mmHg    TAPSE 2.04 cm    RVDD 3.57 cm    LA size 3.91 cm    Ascending aorta 3.14 cm    STJ 2.86 cm    Sinus 3.32 cm    LVIDs 3.46 2.1 - 4.0 cm    Posterior Wall 0.9 0.6 - 1.1 cm    IVS 1.2 (A) 0.6 - 1.1 cm    LVIDd 5.1 3.5 - 6.0 cm    TDI LATERAL 0.17 m/s    LA WIDTH 4.91 cm    TDI SEPTAL 0.10 m/s    LV LATERAL E/E' RATIO 8.24 m/s    LV SEPTAL E/E' RATIO 14.00 m/s    FS 32 28 - 44 %    LA volume 113.98 cm3    LV mass 200.78 g    ZLVIDD -5.96     ZLVIDS -3.78     Left Ventricle Relative Wall Thickness 0.35 cm    AV valve area 2.39 cm²    AV Velocity Ratio 0.62     AV index (prosthetic) 0.64     MV valve area p 1/2 method 3.77 cm2    E/A ratio 1.61     Mean e' 0.14 m/s    LVOT area 3.7 cm2    LVOT stroke volume 75.36 cm3    AV peak gradient 9 mmHg    E/E' ratio 10.37 m/s    LV Systolic Volume Index 21.3 mL/m2    LV Diastolic Volume Index 55.61 mL/m2    LA Volume Index 49.1 mL/m2    LV Mass Index 87 g/m2    Triscuspid Valve Regurgitation Peak Gradient 41 mmHg    LA Volume Index (Mod) 49.1 mL/m2    ARDEN by Velocity Ratio 2.33 cm²    BSA 2.41 m2    TV resting pulmonary artery pressure 44 mmHg    RV TB RVSP 6 mmHg    Est. RA pres 3 mmHg    Narrative      Left Ventricle: The left ventricle is normal in size. Mild septal   thickening is present measuring 1.2 cm. There is normal systolic function   with a visually estimated ejection fraction of 60 - 65%. There is   indeterminate diastolic function.    Right Ventricle: Normal right ventricular cavity size. Wall thickness   is  normal. Right ventricle wall motion  is normal. Systolic function is   normal.    Aortic Valve: The aortic valve is a trileaflet valve. There is mild   aortic valve sclerosis.    Tricuspid Valve: There is mild regurgitation.    Pulmonary Artery: There is mild pulmonary hypertension. The estimated   pulmonary artery systolic pressure is 44 mmHg.    IVC/SVC: Normal venous pressure at 3 mmHg.       Assessment and Plan:     Chronic anticoagulation  Eliquis on hold 2/2 anemia with HGB 6.5- resume when cleared by GI     Paroxysmal atrial fibrillation  AF rate controlled with Mobitz 1  Not on AV tj blockers  DOAC on hold given concern for GIB- resume when cleared by GI     Chronic deep vein thrombosis (DVT) of right upper extremity  Per chronic anticoagulation     Peripheral arterial disease  No recent arterial US on file   Given his bed bound status, malnutrition with albumin 1.6, Hgb 6.2 (unable to tolerate DAPT, DOAC at present), numerous pressure ulcers, AKA, feel he is not a revascularization candidate.   Medical management as tolerated  Local [facility]  Podiatry following  Would not be opposed to Palliative care consult for ongoing [facility] discussion                                     VTE Risk Mitigation (From admission, onward)           Ordered     Reason for No Pharmacological VTE Prophylaxis  Once        Question:  Reasons:  Answer:  Risk of Bleeding    06/26/24 1453     IP VTE HIGH RISK PATIENT  Once         06/26/24 1453     Place sequential compression device  Until discontinued         06/26/24 1453                    Thank you for your consult. I will sign off. Please contact us if you have any additional questions.    Jac Agustin NP  Cardiology   [place] [facility] S[facility]

## 2024-06-28 NOTE — SUBJECTIVE & OBJECTIVE
Past Medical History:   Diagnosis Date    Arthritis     legs    Bacteremia due to Gram-negative bacteria 3/23/2021    Diabetes mellitus     Diabetes mellitus, type 2     Hyperlipidemia     Hypertension     Osteomyelitis     Palliative care encounter 5/24/2023       Past Surgical History:   Procedure Laterality Date    ABOVE-KNEE AMPUTATION Left 3/27/2024    Procedure: AMPUTATION, ABOVE KNEE;  Surgeon: Adal Arellano MD;  Location: [facility] OR [address] [place] [address];  Service: Vascular;  Laterality: Left;    ANGIOGRAPHY OF LOWER EXTREMITY N/A 2/3/2021    Procedure: Angiogram Extremity Bilateral;  Surgeon: Ernst Chacko MD;  Location: [facility] OR [address] [place] [address];  Service: Peripheral Vascular;  Laterality: N/A;  7.4 mintues fluoroscopy time  816.15 mGy  170.17 Gycm2    AORTOGRAPHY WITH EXTREMITY RUNOFF Bilateral 2/3/2021    Procedure: AORTOGRAM, WITH EXTREMITY RUNOFF;  Surgeon: Ernst Chacko MD;  Location: [facility] OR [address] [place] [address];  Service: Peripheral Vascular;  Laterality: Bilateral;    DEBRIDEMENT OF FOOT Left 2/23/2021    Procedure: DEBRIDEMENT, LEFT HEEL;  Surgeon: Mayra Schroeder DPM;  Location: [facility] OR [address] [place] [address];  Service: Podiatry;  Laterality: Left;    FOOT AMPUTATION  October 2010    left high midfoot amputation    IMPLANTATION OF LEADLESS PACEMAKER N/A 10/12/2023    Procedure: [id number];  Surgeon: VANESSA Delatorre MD;  Location: [facility] EP LAB;  Service: Cardiology;  Laterality: N/A;  AVB, MICRA, EH, ANES, [place] [id number]       Review of patient's allergies indicates:  No Known Allergies    No current facility-administered medications on file prior to encounter.     Current Outpatient Medications on File Prior to Encounter   Medication Sig    acetaminophen (TYLENOL) 325 MG tablet Take 650 mg by mouth every 6 (six) hours as needed for Pain.    acidophilus-pectin, citrus 100 million cell-10 mg Cap Take 1 capsule by mouth 2 (two) times a day.    apixaban (ELIQUIS) 5 mg Tab Take 1 tablet (5 mg total) by  mouth 2 (two) times daily.    arginine-glutamine-calcium HMB (HARITHA) 7-7-1.5 gram PwPk Take 1 packet by mouth 2 (two) times a day.    ascorbic acid, vitamin C, (VITAMIN C) 500 MG tablet Take 500 mg by mouth 2 (two) times daily.    ergocalciferol (ERGOCALCIFEROL) 50,000 unit Cap Take 1 capsule (50,000 Units total) by mouth every 7 days. for 10 doses (Patient taking differently: Take 50,000 Units by mouth Every Friday.)    ferrous sulfate 325 (65 FE) MG EC tablet Take 325 mg by mouth 2 (two) times daily.    furosemide (LASIX) 20 MG tablet Take 1 tablet (20 mg total) by mouth once daily.    gabapentin (NEURONTIN) 300 MG capsule Take 300 mg by mouth 2 (two) times daily.    insulin aspart U-100 (NOVOLOG) 100 unit/mL (3 mL) InPn pen Inject 5 Units into the skin 3 (three) times daily with meals.    insulin aspart U-100 (NOVOLOG) 100 unit/mL injection Inject 2-10 Units into the skin 3 (three) times daily before meals. 0-149=0  150-200: 2 units  201-250: 4 units  251-300: 6 units  301-350: 8 units  351-1000=10 units, NOTIFY MD NILDA KING U-100 INSULIN 100 unit/mL (3 mL) InPn pen Inject 12 Units into the skin 2 (two) times a day.    levoFLOXacin (LEVAQUIN) 750 MG tablet Take 750 mg by mouth once daily.    menthol-zinc oxide (CALMOSEPTINE) 0.44-20.6 % Oint Apply topically as needed.    multivitamin (THERAGRAN) per tablet Take 1 tablet by mouth once daily.    NIFEdipine (PROCARDIA-XL) 90 MG (OSM) 24 hr tablet Take 1 tablet (90 mg total) by mouth once daily.    oxyCODONE (ROXICODONE) 5 MG immediate release tablet Take 1 tablet (5 mg total) by mouth every 6 (six) hours as needed for Pain.    pantoprazole (PROTONIX) 40 MG tablet Take 40 mg by mouth once daily. Before breakfast    povidone-iodine (BETADINE) 10 % external solution Apply topically as needed for Wound Care.    SANTYL ointment Apply topically once daily. unknown    tamsulosin (FLOMAX) 0.4 mg Cap Take 0.4 mg by mouth once daily.    zinc sulfate (ZINCATE) 50 mg  "zinc (220 mg) capsule Take 220 mg by mouth every morning.    aspirin (ECOTRIN) 81 MG EC tablet Take 81 mg by mouth. unknown    BD AUTOSHIELD DUO PEN NEEDLE 30 gauge x 3/16" Ndle     BD ULTRA-FINE ADITYA PEN NEEDLE 32 gauge x 5/32" Ndle USE FOUR TIMES DAILY WITH MEALS AND NIGHTLY    blood sugar diagnostic (CONTOUR TEST STRIPS) Strp 1 strip by Misc.(Non-Drug; Combo Route) route 4 (four) times daily. Use to check blood glucose 4x daily    fluconazole (DIFLUCAN) 100 MG tablet Take 100 mg by mouth. unknown    flucytosine (ANCOBON) 500 mg Cap Take by mouth. unknown    hydrALAZINE (APRESOLINE) 100 MG tablet Take by mouth. unknown    lancets (MICROLET LANCET) Misc 1 each by Misc.(Non-Drug; Combo Route) route 4 (four) times daily. Use to check blood sugars 4x daily    TRUE METRIX GLUCOSE METER Misc     [DISCONTINUED] bumetanide (BUMEX) 0.5 MG Tab Take 0.5 mg by mouth daily as needed.    [DISCONTINUED] bumetanide (BUMEX) 1 MG tablet Take by mouth. unknown    [DISCONTINUED] hydroCHLOROthiazide (HYDRODIURIL) 25 MG tablet Take 0.5 tablets (12.5 mg total) by mouth every Mon, Wed, Fri. Beginning on 7/4/2022    [DISCONTINUED] insulin detemir U-100, Levemir, (LEVEMIR FLEXPEN) 100 unit/mL (3 mL) InPn pen Inject 12 Units into the skin 2 (two) times daily. (Patient not taking: Reported on 6/26/2024)    [DISCONTINUED] isosorbide dinitrate (ISORDIL) 10 MG tablet Take 1 tablet (10 mg total) by mouth 3 (three) times daily. Hold until follow up with a provider    [DISCONTINUED] potassium chloride SA (K-DUR,KLOR-CON) 20 MEQ tablet Take 20 mEq by mouth 2 (two) times daily.     Family History       Problem Relation (Age of Onset)    Cancer Brother    Diabetes Mother, Sister    Heart disease Mother    Stroke Sister          Tobacco Use    Smoking status: Never    Smokeless tobacco: Never   Substance and Sexual Activity    Alcohol use: No     Comment: occassional    Drug use: No    Sexual activity: Not Currently     Review of Systems "   Constitutional: Positive for malaise/fatigue. Negative for diaphoresis.   Cardiovascular:  Negative for chest pain, near-syncope, orthopnea, palpitations, paroxysmal nocturnal dyspnea and syncope.   Skin:  Positive for poor wound healing.   Neurological:  Positive for weakness.     Objective:     Vital Signs (Most Recent):  Temp: 99.2 °F (37.3 °C) (06/28/24 0714)  Pulse: 76 (06/28/24 1200)  Resp: 20 (06/28/24 1124)  BP: 133/62 (06/28/24 1124)  SpO2: 98 % (06/28/24 1124) Vital Signs (24h Range):  Temp:  [98.4 °F (36.9 °C)-99.4 °F (37.4 °C)] 99.2 °F (37.3 °C)  Pulse:  [57-94] 76  Resp:  [20] 20  SpO2:  [97 %-100 %] 98 %  BP: (113-147)/(57-70) 133/62     Weight: 87.5 kg (192 lb 14.4 oz)  Body mass index is 28.49 kg/m².    SpO2: 98 %         Intake/Output Summary (Last 24 hours) at 6/28/2024 1259  Last data filed at 6/28/2024 0511  Gross per 24 hour   Intake 825 ml   Output 1000 ml   Net -175 ml       Lines/Drains/Airways       Drain  Duration                  Urethral Catheter 06/26/24 1815 1 day              Peripheral Intravenous Line  Duration                  Peripheral IV - Single Lumen 06/26/24 1645 20 G Anterior;Left Upper Arm 1 day                     Physical Exam  HENT:      Head: Atraumatic.   Eyes:      General:         Right eye: No discharge.         Left eye: No discharge.   Cardiovascular:      Rate and Rhythm: Normal rate and regular rhythm.      Heart sounds: Murmur heard.   Pulmonary:      Effort: Pulmonary effort is normal.      Breath sounds: No rales.   Abdominal:      General: Bowel sounds are normal.   Musculoskeletal:         General: Deformity present.   Skin:     General: Skin is warm.   Neurological:      Mental Status: He is alert. Mental status is at baseline.          Significant Labs: BMP:   Recent Labs   Lab 06/27/24  0527 06/28/24  0523   * 86    141   K 3.5 3.7    100   CO2 31* 30*   BUN 10 9   CREATININE 0.7 0.7   CALCIUM 8.5* 8.4*   MG 1.7 1.6   , CMP   Recent  "Labs   Lab 06/27/24  0527 06/28/24  0523    141   K 3.5 3.7    100   CO2 31* 30*   * 86   BUN 10 9   CREATININE 0.7 0.7   CALCIUM 8.5* 8.4*   ANIONGAP 9 11   , CBC   Recent Labs   Lab 06/27/24  0805 06/27/24  1405 06/28/24  0523   WBC 9.18 8.42 8.54   HGB 6.5@RCBLD* 7.5* 7.7*   HCT 21.6@RCBLD* 24.2* 24.8*   * 491* 520*   , INR No results for input(s): "INR", "PROTIME" in the last 48 hours., Lipid Panel No results for input(s): "CHOL", "HDL", "LDLCALC", "TRIG", "CHOLHDL" in the last 48 hours., Troponin   Recent Labs   Lab 06/26/24  1720   TROPONINI 0.010   , and All pertinent lab results from the last 24 hours have been reviewed.    Significant Imaging: Echocardiogram: Transthoracic echo (TTE) complete (Cupid Only):   Results for orders placed or performed during the hospital encounter of 03/05/24   Echo   Result Value Ref Range    RA Width 4.87 cm    LA volume (mod) 113.86 cm3    Left Atrium Major Axis 7.02 cm    Left Atrium Minor Axis 6.95 cm    RA Major Axis 6.20 cm    LV Diastolic Volume 129.01 mL    LV Systolic Volume 49.46 mL    MV Peak A Dl 0.87 m/s    MV stenosis pressure 1/2 time 58.28 ms    TR Max Dl 3.19 m/s    MV Peak E Dl 1.40 m/s    Ao VTI 31.53 cm    Ao peak dl 1.49 m/s    LVOT peak VTI 20.20 cm    LVOT peak dl 0.93 m/s    LVOT diameter 2.18 cm    IVRT 62.80 msec    E wave deceleration time 200.97 msec    AV mean gradient 6 mmHg    TAPSE 2.04 cm    RVDD 3.57 cm    LA size 3.91 cm    Ascending aorta 3.14 cm    STJ 2.86 cm    Sinus 3.32 cm    LVIDs 3.46 2.1 - 4.0 cm    Posterior Wall 0.9 0.6 - 1.1 cm    IVS 1.2 (A) 0.6 - 1.1 cm    LVIDd 5.1 3.5 - 6.0 cm    TDI LATERAL 0.17 m/s    LA WIDTH 4.91 cm    TDI SEPTAL 0.10 m/s    LV LATERAL E/E' RATIO 8.24 m/s    LV SEPTAL E/E' RATIO 14.00 m/s    FS 32 28 - 44 %    LA volume 113.98 cm3    LV mass 200.78 g    ZLVIDD -5.96     ZLVIDS -3.78     Left Ventricle Relative Wall Thickness 0.35 cm    AV valve area 2.39 cm²    AV Velocity " Ratio 0.62     AV index (prosthetic) 0.64     MV valve area p 1/2 method 3.77 cm2    E/A ratio 1.61     Mean e' 0.14 m/s    LVOT area 3.7 cm2    LVOT stroke volume 75.36 cm3    AV peak gradient 9 mmHg    E/E' ratio 10.37 m/s    LV Systolic Volume Index 21.3 mL/m2    LV Diastolic Volume Index 55.61 mL/m2    LA Volume Index 49.1 mL/m2    LV Mass Index 87 g/m2    Triscuspid Valve Regurgitation Peak Gradient 41 mmHg    LA Volume Index (Mod) 49.1 mL/m2    ARDEN by Velocity Ratio 2.33 cm²    BSA 2.41 m2    TV resting pulmonary artery pressure 44 mmHg    RV TB RVSP 6 mmHg    Est. RA pres 3 mmHg    Narrative      Left Ventricle: The left ventricle is normal in size. Mild septal   thickening is present measuring 1.2 cm. There is normal systolic function   with a visually estimated ejection fraction of 60 - 65%. There is   indeterminate diastolic function.    Right Ventricle: Normal right ventricular cavity size. Wall thickness   is normal. Right ventricle wall motion  is normal. Systolic function is   normal.    Aortic Valve: The aortic valve is a trileaflet valve. There is mild   aortic valve sclerosis.    Tricuspid Valve: There is mild regurgitation.    Pulmonary Artery: There is mild pulmonary hypertension. The estimated   pulmonary artery systolic pressure is 44 mmHg.    IVC/SVC: Normal venous pressure at 3 mmHg.

## 2024-06-28 NOTE — ASSESSMENT & PLAN NOTE
Chronic, uncontrolled. Latest blood pressure and vitals reviewed-     Temp:  [98.2 °F (36.8 °C)-99.4 °F (37.4 °C)]   Pulse:  [53-94]   Resp:  [20]   BP: (109-147)/(57-72)   SpO2:  [97 %-100 %] .   Home meds for hypertension were reviewed and noted below.   Hypertension Medications               furosemide (LASIX) 20 MG tablet Take 1 tablet (20 mg total) by mouth once daily.    NIFEdipine (PROCARDIA-XL) 90 MG (OSM) 24 hr tablet Take 1 tablet (90 mg total) by mouth once daily.    bumetanide (BUMEX) 0.5 MG Tab Take 0.5 mg by mouth daily as needed.    bumetanide (BUMEX) 1 MG tablet Take by mouth. unknown    hydrALAZINE (APRESOLINE) 100 MG tablet Take by mouth. unknown    isosorbide dinitrate (ISORDIL) 10 MG tablet Take 1 tablet (10 mg total) by mouth 3 (three) times daily. Hold until follow up with a provider            While in the hospital, will manage blood pressure as follows; will resume meds as tolerated     Will utilize p.r.n. blood pressure medication only if patient's blood pressure greater than 160/100 and he develops symptoms such as worsening chest pain or shortness of breath.

## 2024-06-28 NOTE — ASSESSMENT & PLAN NOTE
Patient with trial fibrillation which is uncontrolled currently with Calcium Channel Blocker. Patient is currently in atrial fibrillation.[id number] Score: 4. . Anticoagulation on hold 2/2 GI bleed.

## 2024-06-28 NOTE — PLAN OF CARE
Problem: Adult Inpatient Plan of Care  Goal: Plan of Care Review  Outcome: Progressing   VN note:   Patient's chart, labs and vital signs reviewed, will be available to intervene if needed.

## 2024-06-28 NOTE — PLAN OF CARE
Pt Transferred to endo from floor via stretcher. V/S are stable  distress noted at this time.Will continue to monitor.

## 2024-06-28 NOTE — TRANSFER OF CARE
"Anesthesia Transfer of Care Note    Patient: Saji Castañeda    Procedure(s) Performed: Procedure(s) (LRB):  EGD (ESOPHAGOGASTRODUODENOSCOPY) (N/A)    Patient location: GI    Anesthesia Type: general    Transport from OR: Transported from OR on room air with adequate spontaneous ventilation    Post pain: adequate analgesia    Post assessment: no apparent anesthetic complications and tolerated procedure well    Post vital signs: stable    Level of consciousness: awake and alert    Nausea/Vomiting: no nausea/vomiting    Complications: none    Transfer of care protocol was followed      Last vitals: Visit Vitals  BP (!) 146/47   Pulse 62   Temp 36.6 °C (97.8 °F)   Resp 20   Ht 5' 9" (1.753 m)   Wt 87.5 kg (192 lb 14.4 oz)   SpO2 (!) 94%   BMI 28.49 kg/m²     "

## 2024-06-28 NOTE — NURSING
Pt could not finish prep, no BM yet, about 400 left in bottle  No new orders or tasks after notified NP on call

## 2024-06-28 NOTE — SUBJECTIVE & OBJECTIVE
Scheduled Meds:   ferrous sulfate  1 tablet Oral Daily    gabapentin  300 mg Oral BID    isosorbide dinitrate  10 mg Oral TID    pantoprazole  40 mg Intravenous BID    senna-docusate 8.6-50 mg  1 tablet Oral BID    sodium chloride 0.9%  10 mL Intravenous Q8H    tamsulosin  0.4 mg Oral Daily     Continuous Infusions:  PRN Meds:  Current Facility-Administered Medications:     0.9%  NaCl infusion (for blood administration), , Intravenous, Q24H PRN    0.9%  NaCl infusion (for blood administration), , Intravenous, Q24H PRN    acetaminophen, 650 mg, Oral, Q4H PRN    albuterol-ipratropium, 3 mL, Nebulization, Q4H PRN    aluminum-magnesium hydroxide-simethicone, 30 mL, Oral, QID PRN    dextrose 10%, 12.5 g, Intravenous, PRN    dextrose 10%, 25 g, Intravenous, PRN    diphenhydrAMINE, 25 mg, Oral, Q6H PRN    furosemide (LASIX) injection, 20 mg, Intravenous, PRN    glucagon (human recombinant), 1 mg, Intramuscular, PRN    glucose, 16 g, Oral, PRN    glucose, 24 g, Oral, PRN    HYDROcodone-acetaminophen, 1 tablet, Oral, Q6H PRN    insulin aspart U-100, 0-5 Units, Subcutaneous, QID (AC + HS) PRN    melatonin, 6 mg, Oral, Nightly PRN    morphine, 1 mg, Intravenous, Q6H PRN    ondansetron, 8 mg, Oral, Q8H PRN    ondansetron, 4 mg, Intravenous, Q8H PRN    simethicone, 1 tablet, Oral, QID PRN    Review of patient's allergies indicates:  No Known Allergies     Past Medical History:   Diagnosis Date    Arthritis     legs    Bacteremia due to Gram-negative bacteria 3/23/2021    Diabetes mellitus     Diabetes mellitus, type 2     Hyperlipidemia     Hypertension     Osteomyelitis     Palliative care encounter 5/24/2023     Past Surgical History:   Procedure Laterality Date    ABOVE-KNEE AMPUTATION Left 3/27/2024    Procedure: AMPUTATION, ABOVE KNEE;  Surgeon: Adal Arellano MD;  Location: [facility] OR [address] [place] [address];  Service: Vascular;  Laterality: Left;    ANGIOGRAPHY OF LOWER EXTREMITY N/A 2/3/2021    Procedure: Angiogram Extremity  Bilateral;  Surgeon: Ernst Chacko MD;  Location: [facility] OR 2ND FLR;  Service: Peripheral Vascular;  Laterality: N/A;  7.4 mintues fluoroscopy time  816.15 mGy  170.17 Gycm2    AORTOGRAPHY WITH EXTREMITY RUNOFF Bilateral 2/3/2021    Procedure: AORTOGRAM, WITH EXTREMITY RUNOFF;  Surgeon: Ernst Chacko MD;  Location: [facility] OR 2ND FLR;  Service: Peripheral Vascular;  Laterality: Bilateral;    DEBRIDEMENT OF FOOT Left 2/23/2021    Procedure: DEBRIDEMENT, LEFT HEEL;  Surgeon: Mayra Schroeder DPM;  Location: [facility] OR 1ST FLR;  Service: Podiatry;  Laterality: Left;    FOOT AMPUTATION  October 2010    left high midfoot amputation    IMPLANTATION OF LEADLESS PACEMAKER N/A 10/12/2023    Procedure: [id number];  Surgeon: VANESSA Delatorre MD;  Location: [facility] EP LAB;  Service: Cardiology;  Laterality: N/A;  AVB, MICRA, EH, ANES, [place] [id number]       Family History       Problem Relation (Age of Onset)    Cancer Brother    Diabetes Mother, Sister    Heart disease Mother    Stroke Sister          Tobacco Use    Smoking status: Never    Smokeless tobacco: Never   Substance and Sexual Activity    Alcohol use: No     Comment: occassional    Drug use: No    Sexual activity: Not Currently     Review of Systems   Constitutional:  Negative for activity change, chills, diaphoresis and fever.   HENT:  Negative for congestion and hearing loss.    Respiratory:  Negative for cough and shortness of breath.    Gastrointestinal:  Negative for nausea and vomiting.   Skin:  Positive for color change and wound. Negative for pallor and rash.   Neurological:  Positive for numbness. Negative for speech difficulty.   Psychiatric/Behavioral:  Negative for agitation and confusion.      Objective:     Vital Signs (Most Recent):  Temp: 99.2 °F (37.3 °C) (06/28/24 0714)  Pulse: 85 (06/28/24 0714)  Resp: 20 (06/28/24 0714)  BP: (!) 147/70 (06/28/24 0714)  SpO2: 99 % (06/28/24 0714) Vital Signs (24h Range):  Temp:  [98.2 °F  (36.8 °C)-99.4 °F (37.4 °C)] 99.2 °F (37.3 °C)  Pulse:  [53-91] 85  Resp:  [20] 20  SpO2:  [95 %-100 %] 99 %  BP: (102-147)/(55-72) 147/70     Weight: 87.5 kg (192 lb 14.4 oz)  Body mass index is 28.49 kg/m².    Foot Exam    General  General Appearance: appears stated age and healthy   Orientation: alert and oriented to person, place, and time   Affect: appropriate       Right Foot/Ankle     Inspection and Palpation  Ecchymosis: none  Tenderness: none   Swelling: none   Skin Exam: skin intact;     Neurovascular  Dorsalis pedis: 1+  Posterior tibial: absent  Saphenous nerve sensation: diminished  Tibial nerve sensation: diminished  Superficial peroneal nerve sensation: diminished  Deep peroneal nerve sensation: diminished  Sural nerve sensation: diminished      Left Foot/Ankle      Inspection and Palpation  Ecchymosis: none  Tenderness: none   Swelling: none   Skin Exam: skin intact;     Neurovascular  Dorsalis pedis: 1+  Posterior tibial: absent  Saphenous nerve sensation: diminished  Tibial nerve sensation: diminished  Superficial peroneal nerve sensation: diminished  Deep peroneal nerve sensation: diminished  Sural nerve sensation: diminished          Laboratory:  A1C:   Recent Labs   Lab 03/05/24  0900 06/26/24  1200   HGBA1C >14.0* 6.8*     Blood Cultures:   Recent Labs   Lab 06/26/24  2344   LABBLOO No Growth to date  No Growth to date     CBC:   Recent Labs   Lab 06/28/24  0523   WBC 8.54   RBC 3.04*   HGB 7.7*   HCT 24.8*   *   MCV 82   MCH 25.3*   MCHC 31.0*     CMP:   Recent Labs   Lab 06/26/24  1200 06/27/24  0527 06/28/24  0523   *   < > 86   CALCIUM 8.8   < > 8.4*   ALBUMIN 1.6*  --   --    PROT 6.7  --   --       < > 141   K 3.0*   < > 3.7   CO2 33*   < > 30*   CL 98   < > 100   BUN 13   < > 9   CREATININE 0.9   < > 0.7   ALKPHOS 79  --   --    ALT 17  --   --    AST 26  --   --    BILITOT 0.2  --   --     < > = values in this interval not displayed.     CRP: No results for  "input(s): "CRP" in the last 168 hours.  ESR: No results for input(s): "SEDRATE" in the last 168 hours.  Wound Cultures:   Recent Labs   Lab 03/06/24  1439   LABAERO PROTEUS MIRABILIS  Few  *  ENTEROBACTER CLOACAE  Few  Skin bossman also present  *     Microbiology Results (last 7 days)       Procedure Component Value Units Date/Time    Blood culture [[id number]]     Order Status: Sent Specimen: Blood     Blood culture [[id number]]     Order Status: Sent Specimen: Blood     Blood culture [[id number]] Collected: 06/26/24 2344    Order Status: Completed Specimen: Blood Updated: 06/27/24 1915     Blood Culture, Routine No Growth to date    Blood culture [[id number]] Collected: 06/26/24 2344    Order Status: Completed Specimen: Blood Updated: 06/27/24 1915     Blood Culture, Routine No Growth to date          Specimen (24h ago, onward)      None            Diagnostic Results:  I have reviewed all pertinent imaging results/findings within the past 24 hours.    Clinical Findings:          "

## 2024-06-28 NOTE — ANESTHESIA PREPROCEDURE EVALUATION
06/28/2024  [facility]  Anesthesia Pre-Operative Evaluation         Patient Name: Saji Castañeda  YOB: 1950  MRN: [id number]    SUBJECTIVE:     Pre-operative evaluation for Procedure(s) (LRB):  EGD (ESOPHAGOGASTRODUODENOSCOPY) (N/A)  COLONOSCOPY (N/A)     06/28/2024    Saji Castañeda is a 74 y.o. male w/ a significant PMHx of HTM, DM, HLD, chronic DVT on AC, CKD3, pAfib and mobitz 1 admitted for symptomatic anemia.    Patient now presents for the above procedure(s).    Last H/H: 7.7/ 24.8    TTE:     Left Ventricle: The left ventricle is normal in size. Mild septal thickening is present measuring 1.2 cm. There is normal systolic function with a visually estimated ejection fraction of 60 - 65%. There is indeterminate diastolic function.    Right Ventricle: Normal right ventricular cavity size. Wall thickness is normal. Right ventricle wall motion  is normal. Systolic function is normal.    Aortic Valve: The aortic valve is a trileaflet valve. There is mild aortic valve sclerosis.    Tricuspid Valve: There is mild regurgitation.    Pulmonary Artery: There is mild pulmonary hypertension. The estimated pulmonary artery systolic pressure is 44 mmHg.    IVC/SVC: Normal venous pressure at 3 mmHg.    Patient Active Problem List   Diagnosis    Tinea pedis    Wound, open, foot with complication    Type 2 diabetes mellitus with diabetic neuropathy, unspecified    Essential hypertension    Type 2 diabetes mellitus, with long-term current use of insulin    Cataract cortical, senile    Anemia    Asymptomatic Mobitz (type) I (Wenckebach's) atrioventricular block    Peripheral arterial disease    PVD (peripheral vascular disease)    Morbid obesity    Leg swelling    Chronic deep vein thrombosis (DVT) of right upper extremity    Nonhealing ulcer of right lower leg with fat layer exposed     Hypergranulation    Osteomyelitis of right lower extremity    Paroxysmal atrial fibrillation    History of complete heart block    Sepsis    Blurred vision, left eye    Other hyperlipidemia    Stage 3b chronic kidney disease    History of amputation of left high mid foot     Long term current use of insulin    Chronic anticoagulation    Urinary retention    Sepsis secondary to UTI    Palliative care encounter    Altered mental status    Wounds, multiple       Review of patient's allergies indicates:  No Known Allergies    Current Inpatient Medications:   ferrous sulfate  1 tablet Oral Daily    gabapentin  300 mg Oral BID    isosorbide dinitrate  10 mg Oral TID    pantoprazole  40 mg Intravenous BID    senna-docusate 8.6-50 mg  1 tablet Oral BID    sodium chloride 0.9%  10 mL Intravenous Q8H    tamsulosin  0.4 mg Oral Daily       No current facility-administered medications on file prior to encounter.     Current Outpatient Medications on File Prior to Encounter   Medication Sig Dispense Refill    acetaminophen (TYLENOL) 325 MG tablet Take 650 mg by mouth every 6 (six) hours as needed for Pain.      acidophilus-pectin, citrus 100 million cell-10 mg Cap Take 1 capsule by mouth 2 (two) times a day.      apixaban (ELIQUIS) 5 mg Tab Take 1 tablet (5 mg total) by mouth 2 (two) times daily.      arginine-glutamine-calcium HMB (HARITHA) 7-7-1.5 gram PwPk Take 1 packet by mouth 2 (two) times a day.      ascorbic acid, vitamin C, (VITAMIN C) 500 MG tablet Take 500 mg by mouth 2 (two) times daily.      ergocalciferol (ERGOCALCIFEROL) 50,000 unit Cap Take 1 capsule (50,000 Units total) by mouth every 7 days. for 10 doses (Patient taking differently: Take 50,000 Units by mouth Every Friday.)      ferrous sulfate 325 (65 FE) MG EC tablet Take 325 mg by mouth 2 (two) times daily.      furosemide (LASIX) 20 MG tablet Take 1 tablet (20 mg total) by mouth once daily. 30 tablet 11    gabapentin (NEURONTIN) 300 MG capsule Take 300 mg by  "mouth 2 (two) times daily.      insulin aspart U-100 (NOVOLOG) 100 unit/mL (3 mL) InPn pen Inject 5 Units into the skin 3 (three) times daily with meals. 3 each 11    insulin aspart U-100 (NOVOLOG) 100 unit/mL injection Inject 2-10 Units into the skin 3 (three) times daily before meals. 0-149=0  150-200: 2 units  201-250: 4 units  251-300: 6 units  301-350: 8 units  351-1000=10 units, NOTIFY MD NILDA KING U-100 INSULIN 100 unit/mL (3 mL) InPn pen Inject 12 Units into the skin 2 (two) times a day.      levoFLOXacin (LEVAQUIN) 750 MG tablet Take 750 mg by mouth once daily.      menthol-zinc oxide (CALMOSEPTINE) 0.44-20.6 % Oint Apply topically as needed.      multivitamin (THERAGRAN) per tablet Take 1 tablet by mouth once daily.      NIFEdipine (PROCARDIA-XL) 90 MG (OSM) 24 hr tablet Take 1 tablet (90 mg total) by mouth once daily. 30 tablet 11    oxyCODONE (ROXICODONE) 5 MG immediate release tablet Take 1 tablet (5 mg total) by mouth every 6 (six) hours as needed for Pain. 30 tablet 0    pantoprazole (PROTONIX) 40 MG tablet Take 40 mg by mouth once daily. Before breakfast      povidone-iodine (BETADINE) 10 % external solution Apply topically as needed for Wound Care.      SANTYL ointment Apply topically once daily. unknown      tamsulosin (FLOMAX) 0.4 mg Cap Take 0.4 mg by mouth once daily.      zinc sulfate (ZINCATE) 50 mg zinc (220 mg) capsule Take 220 mg by mouth every morning.      aspirin (ECOTRIN) 81 MG EC tablet Take 81 mg by mouth. unknown      BD AUTOSHIELD DUO PEN NEEDLE 30 gauge x 3/16" Ndle       BD ULTRA-FINE ADITYA PEN NEEDLE 32 gauge x 5/32" Ndle USE FOUR TIMES DAILY WITH MEALS AND NIGHTLY 100 each 0    blood sugar diagnostic (CONTOUR TEST STRIPS) Strp 1 strip by Misc.(Non-Drug; Combo Route) route 4 (four) times daily. Use to check blood glucose 4x daily 100 strip 6    bumetanide (BUMEX) 0.5 MG Tab Take 0.5 mg by mouth daily as needed.      bumetanide (BUMEX) 1 MG tablet Take by mouth. unknown  "     fluconazole (DIFLUCAN) 100 MG tablet Take 100 mg by mouth. unknown      flucytosine (ANCOBON) 500 mg Cap Take by mouth. unknown      hydrALAZINE (APRESOLINE) 100 MG tablet Take by mouth. unknown      insulin detemir U-100, Levemir, (LEVEMIR FLEXPEN) 100 unit/mL (3 mL) InPn pen Inject 12 Units into the skin 2 (two) times daily. (Patient not taking: Reported on 6/26/2024) 21.6 mL 3    isosorbide dinitrate (ISORDIL) 10 MG tablet Take 1 tablet (10 mg total) by mouth 3 (three) times daily. Hold until follow up with a provider      lancets (MICROLET LANCET) Misc 1 each by Misc.(Non-Drug; Combo Route) route 4 (four) times daily. Use to check blood sugars 4x daily  0    potassium chloride SA (K-DUR,KLOR-CON) 20 MEQ tablet Take 20 mEq by mouth 2 (two) times daily.      TRUE METRIX GLUCOSE METER Misc       [DISCONTINUED] hydroCHLOROthiazide (HYDRODIURIL) 25 MG tablet Take 0.5 tablets (12.5 mg total) by mouth every Mon, Wed, Fri. Beginning on 7/4/2022         Past Surgical History:   Procedure Laterality Date    ABOVE-KNEE AMPUTATION Left 3/27/2024    Procedure: AMPUTATION, ABOVE KNEE;  Surgeon: Adal Arellano MD;  Location: [facility] OR [address] [place] [address];  Service: Vascular;  Laterality: Left;    ANGIOGRAPHY OF LOWER EXTREMITY N/A 2/3/2021    Procedure: Angiogram Extremity Bilateral;  Surgeon: Ernst Chacko MD;  Location: [facility] OR [address] [place] [address];  Service: Peripheral Vascular;  Laterality: N/A;  7.4 mintues fluoroscopy time  816.15 mGy  170.17 Gycm2    AORTOGRAPHY WITH EXTREMITY RUNOFF Bilateral 2/3/2021    Procedure: AORTOGRAM, WITH EXTREMITY RUNOFF;  Surgeon: Ernst Chacko MD;  Location: [facility] OR [address] [place] [address];  Service: Peripheral Vascular;  Laterality: Bilateral;    DEBRIDEMENT OF FOOT Left 2/23/2021    Procedure: DEBRIDEMENT, LEFT HEEL;  Surgeon: Mayra Schroeder DPM;  Location: [facility] OR [address] [place] [address];  Service: Podiatry;  Laterality: Left;    FOOT AMPUTATION  October 2010    left high midfoot amputation    IMPLANTATION  OF LEADLESS PACEMAKER N/A 10/12/2023    Procedure: EIYEKYWAM-CKSUVASNA-WRPIBIIG;  Surgeon: VANESSA Delatorre MD;  Location: [facility] EP LAB;  Service: Cardiology;  Laterality: N/A;  AVB, MICRA, EH, [place]ES, [place] [id number]       OBJECTIVE:     Vital Signs Range (Last 24H):  Temp:  [36.7 °C (98 °F)-37.4 °C (99.4 °F)]   Pulse:  [52-91]   Resp:  [20]   BP: (102-147)/(53-72)   SpO2:  [95 %-100 %]       Significant Labs:  Lab Results   Component Value Date    WBC 8.54 06/28/2024    HGB 7.7 (L) 06/28/2024    HCT 24.8 (L) 06/28/2024     (H) 06/28/2024    CHOL 91 (L) 09/19/2022    TRIG 68 09/19/2022    HDL 40 09/19/2022    ALT 17 06/26/2024    AST 26 06/26/2024     06/28/2024    K 3.7 06/28/2024     06/28/2024    CREATININE 0.7 06/28/2024    BUN 9 06/28/2024    CO2 30 (H) 06/28/2024    TSH 2.04 09/12/2023    PSA 4.8 (H) 12/23/2005    INR 1.3 (H) 05/19/2024    HGBA1C 6.8 (H) 06/26/2024       Diagnostic Studies: No relevant studies.    EKG:   Results for orders placed or performed during the hospital encounter of 06/26/24   EKG 12-lead    Collection Time: 06/26/24  4:02 PM   Result Value Ref Range    QRS Duration 90 ms    [facility]HS QTC Calculation 474 ms    Narrative    Test Reason : D64.9,    Vent. Rate : 079 BPM     Atrial Rate : 098 BPM     P-R Int : 000 ms          QRS Dur : 090 ms      QT Int : 414 ms       P-R-T Axes : 064 048 052 degrees     QTc Int : 474 ms    Sinus rhythm with 2nd degree A-V block (Mobitz I)  Abnormal ECG  Confirmed by Max Henry MD ([id number]) on 6/27/2024 5:08:23 PM    Referred By: [email or web address]   SELF           Confirmed By:Max Henry MD       ASSESSMENT/PLAN:           Pre-op Assessment    I have reviewed the Patient Summary Reports.     I have reviewed the Nursing Notes. I have reviewed the NPO Status.   I have reviewed the Medications.     Review of Systems  Anesthesia Hx:             Denies Family Hx of Anesthesia complications.    Denies Personal Hx of Anesthesia complications.                     Social:  Non-Smoker       Hematology/Oncology:       -- Anemia:                                  Cardiovascular:     Hypertension    Denies CABG/stent. Dysrhythmias atrial fibrillation     PVD hyperlipidemia   ECG has been reviewed.                          Pulmonary:    Denies COPD.  Denies Asthma.                    Renal/:  Chronic Renal Disease                Hepatic/GI:      Denies GERD.             Musculoskeletal:         Denies Spine Disorders             Neurological:    Denies CVA.    Denies Seizures.                                Endocrine:  Diabetes Denies Hypothyroidism.              Physical Exam  General: Cooperative, Alert and Oriented    Airway:  Mallampati: II   Neck ROM: Normal ROM    Dental:  Has one tooth      Anesthesia Plan  Type of Anesthesia, risks & benefits discussed:    Anesthesia Type: Gen Natural Airway  Intra-op Monitoring Plan: Standard ASA Monitors  Post Op Pain Control Plan: multimodal analgesia  Induction:  IV  Informed Consent: Informed consent signed with the Patient and all parties understand the risks and agree with anesthesia plan.  All questions answered.   ASA Score: 3  Day of Surgery Review of History & Physical: H&P Update referred to the surgeon/provider.    Ready For Surgery From Anesthesia Perspective.     .

## 2024-06-28 NOTE — PLAN OF CARE
Report received from nurse Keys, patient awake and stable, bowel prep not finished with no bowel movement, informed Dr. Chandra.

## 2024-06-28 NOTE — PLAN OF CARE
Rounded on pt. Not medically ready for discharge. Plans are discharge back to [place] with hospital transportation when ready. CM will continue to follow pt and provide any discharge needs.  Future Appointments   Date Time Provider Department Center   6/28/2024  6:00 PM [facility] CT1 LIMIT 450 LBS [facility] CT SCAN [facility]   7/2/2024  1:30 PM Keerthi Hardin FNP [facility] UROLOGY [facility]      06/28/24 [id number]   Rounds   Attendance Nurse    Discharge Plan A Return to nursing home   Why the patient remains in the hospital Requires continued medical care   Transition of Care Barriers Transportation

## 2024-06-28 NOTE — ASSESSMENT & PLAN NOTE
Patient's anemia is currently uncontrolled. Has 1 u PRBC ordered. Etiology likely d/t Iron deficiency  Current CBC reviewed-   Lab Results   Component Value Date    HGB 7.7 (L) 06/28/2024    HCT 24.8 (L) 06/28/2024     Monitor serial CBC and transfuse if patient becomes hemodynamically unstable, symptomatic or H/H drops below 7/21.  -attempted blood transfusion yesterday however patient developed chest transfusion was stopped.  Will order 1 pack of packed red blood cells this morning and premedicate prior.  Orders for p.r.n. Lasix to be given in between units.  --consulted GI.  --planning endoscopy on today

## 2024-06-28 NOTE — PLAN OF CARE
AAOX4. No C/o pain this shift. Diet changed from NPO to diabetic. Pt went down for EGD & tolerated procedure well. Awaiting results. Meds given per MAR. Q2 turned/ rounds done. Wound care done per orders. Call light within reach. Bed in lowest position.

## 2024-06-28 NOTE — SUBJECTIVE & OBJECTIVE
Interval History:  Seen at the bedside this morning.  No distress noted.  Planning endoscopy on today.  EGD this a.m. per GI.  Podiatry was consulted for right foot wound.  Follow imaging to rule out osteomyelitis.  Review of Systems   Constitutional:  Positive for activity change, appetite change, fatigue and fever (Low-grade).   HENT:  Negative for trouble swallowing.    Respiratory:  Negative for cough.    Cardiovascular:  Negative for chest pain.   Gastrointestinal:  Negative for diarrhea, nausea and vomiting.   Genitourinary:  Negative for hematuria.   Musculoskeletal:  Positive for arthralgias and myalgias.   Skin:  Positive for wound.   Neurological:  Positive for weakness.   Psychiatric/Behavioral:  Negative for confusion.      Objective:     Vital Signs (Most Recent):  Temp: 99.2 °F (37.3 °C) (06/28/24 0714)  Pulse: 85 (06/28/24 0714)  Resp: 20 (06/28/24 0714)  BP: (!) 147/70 (06/28/24 0714)  SpO2: 99 % (06/28/24 0714) Vital Signs (24h Range):  Temp:  [98.2 °F (36.8 °C)-99.4 °F (37.4 °C)] 99.2 °F (37.3 °C)  Pulse:  [53-91] 85  Resp:  [20] 20  SpO2:  [95 %-100 %] 99 %  BP: (102-147)/(55-72) 147/70     Weight: 87.5 kg (192 lb 14.4 oz)  Body mass index is 28.49 kg/m².     Physical Exam  Vitals and nursing note reviewed.   Constitutional:       Appearance: He is obese. He is ill-appearing.   HENT:      Head: Normocephalic and atraumatic.      Mouth/Throat:      Mouth: Mucous membranes are moist.   Eyes:      Extraocular Movements: Extraocular movements intact.   Cardiovascular:      Rate and Rhythm: Normal rate.      Pulses: Normal pulses.      Heart sounds: Normal heart sounds.   Pulmonary:      Effort: Pulmonary effort is normal.      Breath sounds: Normal breath sounds.   Abdominal:      General: Bowel sounds are normal. There is no distension.      Palpations: Abdomen is soft.      Tenderness: There is no abdominal tenderness. There is no guarding.   Musculoskeletal:      Cervical back: Normal range of  motion and neck supple.      Comments: Left AKA   Skin:     General: Skin is warm and dry.      Capillary Refill: Capillary refill takes 2 to 3 seconds.   Neurological:      Mental Status: He is alert and oriented to person, place, and time. Mental status is at baseline.      Motor: No weakness.   Psychiatric:         Mood and Affect: Mood normal.         Behavior: Behavior normal.                Significant Labs: All pertinent labs within the past 24 hours have been reviewed.    Significant Imaging: I have reviewed all pertinent imaging results/findings within the past 24 hours.

## 2024-06-28 NOTE — PROGRESS NOTES
Pharmacokinetic Initial Assessment: IV Vancomycin    Assessment/Plan:    Initiate intravenous vancomycin with loading dose of 2250 mg once followed by a maintenance dose of vancomycin 1250mg IV every 12 hours  Desired empiric serum trough concentration is 10 to 15 mcg/mL  Draw vancomycin trough level 60 min prior to fourth dose on 6/30 at approximately 0000  Pharmacy will continue to follow and monitor vancomycin.      Please contact pharmacy at extension [phone] with any questions regarding this assessment.     Thank you for the consult,   Va Phelan       Patient brief summary:  Saji Castañeda is a 74 y.o. male initiated on antimicrobial therapy with IV Vancomycin for treatment of suspected skin & soft tissue infection    Drug Allergies:   Review of patient's allergies indicates:  No Known Allergies    Actual Body Weight:   87.5 kg    Renal Function:   Estimated Creatinine Clearance: 99.8 mL/min (based on SCr of 0.7 mg/dL).,     Dialysis Method (if applicable):  N/A    CBC (last 72 hours):  Recent Labs   Lab Result Units 06/26/24  1200 06/26/24  1728 06/27/24  0527 06/27/24  0805 06/27/24  1405 06/28/24  0523   WBC K/uL 10.38 9.41 8.97 9.18 8.42 8.54   Hemoglobin g/dL 6.3* 7.0* 6.2* [facility] 7.5* 7.7*   Hemoglobin A1C % 6.8*  --   --   --   --   --    Hematocrit % 20.6* 22.7* 20.3* [facility] 24.2* 24.8*   Platelets K/uL 524* 500* 477* 535* 491* 520*   Gran % % 79.5* 78.7* 69.2 70.9 70.7 68.6   Lymph % % 10.9* 11.1* 18.4 16.3* 17.9* 17.6*   Mono % % 6.2 6.9 7.6 7.3 6.3 7.4   Eosinophil % % 2.5 2.7 4.0 4.2 4.3 5.2   Basophil % % 0.4 0.3 0.4 0.5 0.6 0.6   Differential Method  Automated Automated Automated Automated Automated Automated       Metabolic Panel (last 72 hours):  Recent Labs   Lab Result Units 06/26/24  1200 06/27/24  0527 06/28/24  0523   Sodium mmol/L 140 142 141   Potassium mmol/L 3.0* 3.5 3.7   Chloride mmol/L 98 102 100   CO2 mmol/L 33* 31* 30*   Glucose mg/dL 233* 118* 86   BUN mg/dL 13 10 9  "  Creatinine mg/dL 0.9 0.7 0.7   Albumin g/dL 1.6*  --   --    Total Bilirubin mg/dL 0.2  --   --    Alkaline Phosphatase U/L 79  --   --    AST U/L 26  --   --    ALT U/L 17  --   --    Magnesium mg/dL  --  1.7 1.6       Drug levels (last 3 results):  No results for input(s): "VANCOMYCINRA", "VANCORANDOM", "VANCOMYCINPE", "VANCOPEAK", "VANCOMYCINTR", "VANCOTROUGH" in the last 72 hours.    Microbiologic Results:  Microbiology Results (last 7 days)       Procedure Component Value Units Date/Time    Blood culture [[id number]]     Order Status: Sent Specimen: Blood     Blood culture [[id number]]     Order Status: Sent Specimen: Blood     Blood culture [[id number]] Collected: 06/26/24 2344    Order Status: Completed Specimen: Blood Updated: 06/27/24 1915     Blood Culture, Routine No Growth to date    Blood culture [[id number]] Collected: 06/26/24 2344    Order Status: Completed Specimen: Blood Updated: 06/27/24 1915     Blood Culture, Routine No Growth to date            "

## 2024-06-28 NOTE — PROGRESS NOTES
[facility]  Progress Note    Patient Name: Saji Castañeda  MRN: [id number]  Patient Class: IP- Inpatient   Admission Date: 6/26/2024  Length of Stay: 1 days  Attending Physician: Akira Li MD  Primary Care Provider: [facility] -        Subjective:     Principal Problem:Anemia        HPI:  Saji Castañeda is a 75 y/o M who has a pmh of Arthritis, Bacteremia due to Gram-negative bacteria, Diabetes mellitus, Diabetes mellitus, type 2, Hyperlipidemia, Hypertension, Osteomyelitis, and Palliative care encounter. presenting to the Emergency Department for referral. Patient was sent to the ED via EMS from [facility] for low blood count. Patient denies any chest pain, shortness on breath, weakness, fatigue, dizziness. His hemoglobin was 6.3--will order 1 u PRBC ordered. FOBT pending. Will admit to the [facility] service for further care.    Overview/Hospital Course:  No notes on file    Interval History:  Seen at the bedside this morning.  No distress noted.  Planning endoscopy on today.  EGD this a.m. per GI.  Podiatry was consulted for right foot wound.  Follow imaging to rule out osteomyelitis.  Review of Systems   Constitutional:  Positive for activity change, appetite change, fatigue and fever (Low-grade).   HENT:  Negative for trouble swallowing.    Respiratory:  Negative for cough.    Cardiovascular:  Negative for chest pain.   Gastrointestinal:  Negative for diarrhea, nausea and vomiting.   Genitourinary:  Negative for hematuria.   Musculoskeletal:  Positive for arthralgias and myalgias.   Skin:  Positive for wound.   Neurological:  Positive for weakness.   Psychiatric/Behavioral:  Negative for confusion.      Objective:     Vital Signs (Most Recent):  Temp: 99.2 °F (37.3 °C) (06/28/24 0714)  Pulse: 85 (06/28/24 0714)  Resp: 20 (06/28/24 0714)  BP: (!) 147/70 (06/28/24 0714)  SpO2: 99 % (06/28/24 0714) Vital Signs (24h Range):  Temp:  [98.2 °F (36.8 °C)-99.4 °F  (37.4 °C)] 99.2 °F (37.3 °C)  Pulse:  [53-91] 85  Resp:  [20] 20  SpO2:  [95 %-100 %] 99 %  BP: (102-147)/(55-72) 147/70     Weight: 87.5 kg (192 lb 14.4 oz)  Body mass index is 28.49 kg/m².     Physical Exam  Vitals and nursing note reviewed.   Constitutional:       Appearance: He is obese. He is ill-appearing.   HENT:      Head: Normocephalic and atraumatic.      Mouth/Throat:      Mouth: Mucous membranes are moist.   Eyes:      Extraocular Movements: Extraocular movements intact.   Cardiovascular:      Rate and Rhythm: Normal rate.      Pulses: Normal pulses.      Heart sounds: Normal heart sounds.   Pulmonary:      Effort: Pulmonary effort is normal.      Breath sounds: Normal breath sounds.   Abdominal:      General: Bowel sounds are normal. There is no distension.      Palpations: Abdomen is soft.      Tenderness: There is no abdominal tenderness. There is no guarding.   Musculoskeletal:      Cervical back: Normal range of motion and neck supple.      Comments: Left AKA   Skin:     General: Skin is warm and dry.      Capillary Refill: Capillary refill takes 2 to 3 seconds.   Neurological:      Mental Status: He is alert and oriented to person, place, and time. Mental status is at baseline.      Motor: No weakness.   Psychiatric:         Mood and Affect: Mood normal.         Behavior: Behavior normal.                Significant Labs: All pertinent labs within the past 24 hours have been reviewed.    Significant Imaging: I have reviewed all pertinent imaging results/findings within the past 24 hours.    Assessment/Plan:      * Anemia  Patient's anemia is currently uncontrolled. Has 1 u PRBC ordered. Etiology likely d/t Iron deficiency  Current CBC reviewed-   Lab Results   Component Value Date    HGB 7.7 (L) 06/28/2024    HCT 24.8 (L) 06/28/2024     Monitor serial CBC and transfuse if patient becomes hemodynamically unstable, symptomatic or H/H drops below 7/21.  -attempted blood transfusion yesterday however  patient developed chest transfusion was stopped.  Will order 1 pack of packed red blood cells this morning and premedicate prior.  Orders for p.r.n. Lasix to be given in between units.  --consulted GI.  --planning endoscopy on today    Pressure injury of ankle, unstageable-lateral  Pressure injury of buttock, stage 4   Pressure injury of right heel, stage 4     - Appreciate IP wound care  - Pressure injury prevention interventions - waffle overlay and heel boot  - Vashe wet-to-dry to bilateral buttocks, R buttock, and R heel wound BID  - Mepilex border to L posterior thigh/gluteal fold 3x/week  - Podiatry consult for R heel wound   --also noted with a bilateral buttock full thickness pressure injury  --Right heel and sacrum CT ordered to r/o OM      Wounds, multiple  -patient with wounds to his sacrum and heel  -wound care consulted  -Ulcer care ordered      Chronic anticoagulation  Holding OAC 2/2 GI bleed      Stage 3b chronic kidney disease  Creatine stable for now. BMP reviewed- noted Estimated Creatinine Clearance: 99.8 mL/min (based on SCr of 0.7 mg/dL). according to latest data. Based on current GFR, CKD stage is stage 2 - GFR 60-89.  Monitor UOP and serial BMP and adjust therapy as needed. Renally dose meds. Avoid nephrotoxic medications and procedures.    Paroxysmal atrial fibrillation  Patient with trial fibrillation which is uncontrolled currently with Calcium Channel Blocker. Patient is currently in atrial fibrillation.[id number] Score: 4. . Anticoagulation on hold 2/2 GI bleed.    Osteomyelitis of right lower extremity  - Consulted ID  - IV abx ordered---vancomycin and zosyn  - Blood cultures pending         Chronic deep vein thrombosis (DVT) of right upper extremity  -holding anticoagulation in the setting of GI bleed  -follow      Peripheral arterial disease  PVD (peripheral vascular disease)    -holding ASA and anticoagulation at this time. Will follow       Type 2 diabetes mellitus, with long-term  current use of insulin    Patient's FSGs are uncontrolled due to hyperglycemia on current medication regimen.  Last A1c reviewed-   Lab Results   Component Value Date    HGBA1C 6.8 (H) 06/26/2024     Most recent fingerstick glucose reviewed-   Recent Labs   Lab 06/27/24  1536 06/27/24  2046 06/28/24  0535 06/28/24  1124   POCTGLUCOSE 127* 104 88 101       Current correctional scale  Low  Maintain anti-hyperglycemic dose as follows-   Antihyperglycemics (From admission, onward)      Start     Stop Route Frequency Ordered    06/26/24 1552  insulin aspart U-100 pen 0-5 Units         -- SubQ Before meals & nightly PRN 06/26/24 1453          Hold Oral hypoglycemics while patient is in the hospital.      Essential hypertension  Chronic, uncontrolled. Latest blood pressure and vitals reviewed-     Temp:  [98.2 °F (36.8 °C)-99.4 °F (37.4 °C)]   Pulse:  [53-94]   Resp:  [20]   BP: (109-147)/(57-72)   SpO2:  [97 %-100 %] .   Home meds for hypertension were reviewed and noted below.   Hypertension Medications               furosemide (LASIX) 20 MG tablet Take 1 tablet (20 mg total) by mouth once daily.    NIFEdipine (PROCARDIA-XL) 90 MG (OSM) 24 hr tablet Take 1 tablet (90 mg total) by mouth once daily.    bumetanide (BUMEX) 0.5 MG Tab Take 0.5 mg by mouth daily as needed.    bumetanide (BUMEX) 1 MG tablet Take by mouth. unknown    hydrALAZINE (APRESOLINE) 100 MG tablet Take by mouth. unknown    isosorbide dinitrate (ISORDIL) 10 MG tablet Take 1 tablet (10 mg total) by mouth 3 (three) times daily. Hold until follow up with a provider            While in the hospital, will manage blood pressure as follows; will resume meds as tolerated     Will utilize p.r.n. blood pressure medication only if patient's blood pressure greater than 160/100 and he develops symptoms such as worsening chest pain or shortness of breath.      VTE Risk Mitigation (From admission, onward)           Ordered     Reason for No Pharmacological VTE  Prophylaxis  Once        Question:  Reasons:  Answer:  Risk of Bleeding    06/26/24 1453     IP VTE HIGH RISK PATIENT  Once         06/26/24 1453     Place sequential compression device  Until discontinued         06/26/24 1453                    Discharge Planning   OXANA:      Code Status: Full Code   Is the patient medically ready for discharge?:     Reason for patient still in hospital (select all that apply): Treatment, Imaging, Consult recommendations, and PT / OT recommendations  Discharge Plan A: Return to nursing home                  Lo Melgar NP  Department of Hospital Medicine   [facility]

## 2024-06-28 NOTE — PLAN OF CARE
Reported off to Natalya  v/s  97.9  80  15  99/49  100 %RA.  Patient denies any present discomforts.  Patient will be transported back to room 522  via stretcher.

## 2024-06-28 NOTE — HPI
Pt is a 73 y/o male  has a past medical history of Arthritis, Bacteremia due to Gram-negative bacteria, Diabetes mellitus, Diabetes mellitus, type 2, Hyperlipidemia, Hypertension, Osteomyelitis, and Palliative care encounter.  Admitted for Anemia and consulted to Podiatry for heel ulcer.

## 2024-06-28 NOTE — PROGRESS NOTES
"[facility]           Pharmacy  Admission Medication Reconciliation     Based on information gathered for medication list, you may go to "Admission" then "Reconcile Home Medications" tabs to review and/or act upon those items.     The home medication list has been updated by the Pharmacy department.   Please read ALL comments highlighted in red.   Please address this information as you see fit.    Feel free to contact us if you have any questions or require assistance.    Home medication list has been compared to current inpatient medications. Please review the following discrepancies noted below:    Patient reports NO LONGER TAKING the following medication(s) which has been ordered upon admit.  Isosorbide dinitrate  Patient reports STILL TAKING the following medication(s) which was not ordered upon admit  Eliquis  Ascorbic acid  Aspirin  Nifedipine  Patient reports taking a DIFFERENT dose,route, or frequency is listed  than how ordered upon admit  Ferrous sulfate  Feel free to contact us if you have any questions or require assistance.    Va Phelan, PharmD  [phone]        "

## 2024-06-28 NOTE — ASSESSMENT & PLAN NOTE
AF rate controlled with Mobitz 1  Not on AV tj blockers  DOAC on hold given concern for GIB- resume when cleared by GI

## 2024-06-28 NOTE — [employer] PATIENT INSTRUCTIONS
Discharge Summary/Instructions after an Endoscopic Procedure  Patient Name: Saji Castañeda  Patient MRN: [id number]  Patient YOB: 1950 Friday, June 28, 2024  Adal Chandra MD  Dear patient,  As a result of recent federal legislation (The Federal Cures Act), you may   receive lab or pathology results from your procedure in your [facility]   account before your physician is able to contact you. Your physician or   their representative will relay the results to you with their   recommendations at their soonest availability.  Thank you,  Your health is very important to us during the Covid Crisis. Following your   procedure today, you will receive a daily text for 2 weeks asking about   signs or symptoms of Covid 19.  Please respond to this text when you   receive it so we can follow up and keep you as safe as possible.   RESTRICTIONS:  During your procedure today, you received medications for sedation.  These   medications may affect your judgment, balance and coordination.  Therefore,   for 24 hours, you have the following restrictions:   - DO NOT drive a car, operate machinery, make legal/financial decisions,   sign important papers or drink alcohol.    ACTIVITY:  Today: no heavy lifting, straining or running due to procedural   sedation/anesthesia.  The following day: return to full activity including work.  DIET:  Eat and drink normally unless instructed otherwise.     TREATMENT FOR COMMON SIDE EFFECTS:  - Mild abdominal pain, nausea, belching, bloating or excessive gas:  rest,   eat lightly and use a heating pad.  - Sore Throat: treat with throat lozenges and/or gargle with warm salt   water.  - Because air was used during the procedure, expelling large amounts of air   from your rectum or belching is normal.  - If a bowel prep was taken, you may not have a bowel movement for 1-3 days.    This is normal.  SYMPTOMS TO WATCH FOR AND REPORT TO YOUR PHYSICIAN:  1. Abdominal pain or bloating, other  than gas cramps.  2. Chest pain.  3. Back pain.  4. Signs of infection such as: chills or fever occurring within 24 hours   after the procedure.  5. Rectal bleeding, which would show as bright red, maroon, or black stools.   (A tablespoon of blood from the rectum is not serious, especially if   hemorrhoids are present.)  6. Vomiting.  7. Weakness or dizziness.  GO DIRECTLY TO THE NEAREST EMERGENCY ROOM IF YOU HAVE ANY OF THE FOLLOWING:      Difficulty breathing              Chills and/or fever over 101 F   Persistent vomiting and/or vomiting blood   Severe abdominal pain   Severe chest pain   Black, tarry stools   Bleeding- more than one tablespoon   Any other symptom or condition that you feel may need urgent attention  Your doctor recommends these additional instructions:  If any biopsies were taken, your doctors clinic will contact you in 1 to 2   weeks with any results.  - [facility] to [facility] for [facility] care.   - Resume previous diet.   - Continue present medications.   - Await pathology results.   - Observe patient's clinical course.   - Perform a colonoscopy at appointment to be scheduled.  For questions, problems or results please call your physician - Adal Chandra MD.  EMERGENCY PHONE NUMBER: [phone],  LAB RESULTS: [phone]  IF A COMPLICATION OR EMERGENCY SITUATION ARISES AND YOU ARE UNABLE TO REACH   YOUR PHYSICIAN - GO DIRECTLY TO THE EMERGENCY ROOM.  Adal Chandra MD  6/28/2024 2:49:06 PM  This report has been verified and signed electronically.  Dear patient,  As a result of recent federal legislation (The Federal Cures Act), you may   receive lab or pathology results from your procedure in your [facility]   account before your physician is able to contact you. Your physician or   their representative will relay the results to you with their   recommendations at their soonest availability.  Thank you,  [employer]ATION

## 2024-06-29 LAB
ANION GAP SERPL CALC-SCNC: 8 MMOL/L (ref 8–16)
BASOPHILS # BLD AUTO: 0.02 K/UL (ref 0–0.2)
BASOPHILS NFR BLD: 0.2 % (ref 0–1.9)
BUN SERPL-MCNC: 8 MG/DL (ref 8–23)
CALCIUM SERPL-MCNC: 7.9 MG/DL (ref 8.7–10.5)
CHLORIDE SERPL-SCNC: 100 MMOL/L (ref 95–110)
CO2 SERPL-SCNC: 30 MMOL/L (ref 23–29)
CREAT SERPL-MCNC: 0.8 MG/DL (ref 0.5–1.4)
DIFFERENTIAL METHOD BLD: ABNORMAL
EOSINOPHIL # BLD AUTO: 0.4 K/UL (ref 0–0.5)
EOSINOPHIL NFR BLD: 3.4 % (ref 0–8)
ERYTHROCYTE [DISTWIDTH] IN BLOOD BY AUTOMATED COUNT: 18.6 % (ref 11.5–14.5)
EST. GFR  (NO RACE VARIABLE): >60 ML/MIN/1.73 M^2
GLUCOSE SERPL-MCNC: 204 MG/DL (ref 70–110)
HCT VFR BLD AUTO: 23.7 % (ref 40–54)
HGB BLD-MCNC: 7.4 G/DL (ref 14–18)
IMM GRANULOCYTES # BLD AUTO: 0.04 K/UL (ref 0–0.04)
IMM GRANULOCYTES NFR BLD AUTO: 0.3 % (ref 0–0.5)
LYMPHOCYTES # BLD AUTO: 0.7 K/UL (ref 1–4.8)
LYMPHOCYTES NFR BLD: 5.5 % (ref 18–48)
MAGNESIUM SERPL-MCNC: 1.5 MG/DL (ref 1.6–2.6)
MCH RBC QN AUTO: 25.3 PG (ref 27–31)
MCHC RBC AUTO-ENTMCNC: 31.2 G/DL (ref 32–36)
MCV RBC AUTO: 81 FL (ref 82–98)
MONOCYTES # BLD AUTO: 0.2 K/UL (ref 0.3–1)
MONOCYTES NFR BLD: 1.7 % (ref 4–15)
NEUTROPHILS # BLD AUTO: 10.6 K/UL (ref 1.8–7.7)
NEUTROPHILS NFR BLD: 88.9 % (ref 38–73)
NRBC BLD-RTO: 0 /100 WBC
PLATELET # BLD AUTO: 452 K/UL (ref 150–450)
PMV BLD AUTO: 8.4 FL (ref 9.2–12.9)
POCT GLUCOSE: 239 MG/DL (ref 70–110)
POCT GLUCOSE: 243 MG/DL (ref 70–110)
POCT GLUCOSE: 271 MG/DL (ref 70–110)
POCT GLUCOSE: 273 MG/DL (ref 70–110)
POTASSIUM SERPL-SCNC: 3.5 MMOL/L (ref 3.5–5.1)
RBC # BLD AUTO: 2.92 M/UL (ref 4.6–6.2)
SODIUM SERPL-SCNC: 138 MMOL/L (ref 136–145)
WBC # BLD AUTO: 11.91 K/UL (ref 3.9–12.7)

## 2024-06-29 PROCEDURE — 99233 SBSQ HOSP IP/OBS HIGH 50: CPT | Mod: ,,, | Performed by: PODIATRIST

## 2024-06-29 PROCEDURE — C9113 INJ PANTOPRAZOLE SODIUM, VIA: HCPCS | Performed by: NURSE PRACTITIONER

## 2024-06-29 PROCEDURE — 63600175 PHARM REV CODE 636 W HCPCS: Performed by: NURSE PRACTITIONER

## 2024-06-29 PROCEDURE — 36415 COLL VENOUS BLD VENIPUNCTURE: CPT | Performed by: NURSE PRACTITIONER

## 2024-06-29 PROCEDURE — 83735 ASSAY OF MAGNESIUM: CPT | Performed by: NURSE PRACTITIONER

## 2024-06-29 PROCEDURE — 25000003 PHARM REV CODE 250: Performed by: STUDENT IN AN ORGANIZED HEALTH CARE EDUCATION/TRAINING PROGRAM

## 2024-06-29 PROCEDURE — 99900035 HC TECH TIME PER 15 MIN (STAT)

## 2024-06-29 PROCEDURE — A4216 STERILE WATER/SALINE, 10 ML: HCPCS | Performed by: NURSE PRACTITIONER

## 2024-06-29 PROCEDURE — 85025 COMPLETE CBC W/AUTO DIFF WBC: CPT | Performed by: NURSE PRACTITIONER

## 2024-06-29 PROCEDURE — 63600175 PHARM REV CODE 636 W HCPCS: Performed by: STUDENT IN AN ORGANIZED HEALTH CARE EDUCATION/TRAINING PROGRAM

## 2024-06-29 PROCEDURE — 80048 BASIC METABOLIC PNL TOTAL CA: CPT | Performed by: NURSE PRACTITIONER

## 2024-06-29 PROCEDURE — 21400001 HC TELEMETRY ROOM

## 2024-06-29 PROCEDURE — 25000003 PHARM REV CODE 250: Performed by: NURSE PRACTITIONER

## 2024-06-29 RX ORDER — MUPIROCIN 20 MG/G
OINTMENT TOPICAL 2 TIMES DAILY
Status: COMPLETED | OUTPATIENT
Start: 2024-06-29 | End: 2024-07-03

## 2024-06-29 RX ORDER — MAGNESIUM SULFATE HEPTAHYDRATE 40 MG/ML
2 INJECTION, SOLUTION INTRAVENOUS ONCE
Status: COMPLETED | OUTPATIENT
Start: 2024-06-29 | End: 2024-06-29

## 2024-06-29 RX ADMIN — VANCOMYCIN HYDROCHLORIDE 1250 MG: 1.25 INJECTION, POWDER, LYOPHILIZED, FOR SOLUTION INTRAVENOUS at 02:06

## 2024-06-29 RX ADMIN — PIPERACILLIN SODIUM AND TAZOBACTAM SODIUM 4.5 G: 4; .5 INJECTION, POWDER, LYOPHILIZED, FOR SOLUTION INTRAVENOUS at 11:06

## 2024-06-29 RX ADMIN — VANCOMYCIN HYDROCHLORIDE 1250 MG: 1.25 INJECTION, POWDER, LYOPHILIZED, FOR SOLUTION INTRAVENOUS at 12:06

## 2024-06-29 RX ADMIN — ISOSORBIDE DINITRATE 10 MG: 10 TABLET ORAL at 08:06

## 2024-06-29 RX ADMIN — ACETAMINOPHEN 650 MG: 325 TABLET ORAL at 08:06

## 2024-06-29 RX ADMIN — PIPERACILLIN SODIUM AND TAZOBACTAM SODIUM 4.5 G: 4; .5 INJECTION, POWDER, LYOPHILIZED, FOR SOLUTION INTRAVENOUS at 04:06

## 2024-06-29 RX ADMIN — INSULIN ASPART 2 UNITS: 100 INJECTION, SOLUTION INTRAVENOUS; SUBCUTANEOUS at 12:06

## 2024-06-29 RX ADMIN — PANTOPRAZOLE SODIUM 40 MG: 40 INJECTION, POWDER, LYOPHILIZED, FOR SOLUTION INTRAVENOUS at 08:06

## 2024-06-29 RX ADMIN — TAMSULOSIN HYDROCHLORIDE 0.4 MG: 0.4 CAPSULE ORAL at 08:06

## 2024-06-29 RX ADMIN — GABAPENTIN 300 MG: 300 CAPSULE ORAL at 08:06

## 2024-06-29 RX ADMIN — MUPIROCIN: 20 OINTMENT TOPICAL at 08:06

## 2024-06-29 RX ADMIN — INSULIN ASPART 3 UNITS: 100 INJECTION, SOLUTION INTRAVENOUS; SUBCUTANEOUS at 04:06

## 2024-06-29 RX ADMIN — DOCUSATE SODIUM AND SENNOSIDES 1 TABLET: 8.6; 5 TABLET, FILM COATED ORAL at 08:06

## 2024-06-29 RX ADMIN — MAGNESIUM SULFATE HEPTAHYDRATE 2 G: 40 INJECTION, SOLUTION INTRAVENOUS at 12:06

## 2024-06-29 RX ADMIN — FERROUS SULFATE TAB 325 MG (65 MG ELEMENTAL FE) 1 EACH: 325 (65 FE) TAB at 08:06

## 2024-06-29 RX ADMIN — PIPERACILLIN SODIUM AND TAZOBACTAM SODIUM 4.5 G: 4; .5 INJECTION, POWDER, LYOPHILIZED, FOR SOLUTION INTRAVENOUS at 05:06

## 2024-06-29 RX ADMIN — MUPIROCIN: 20 OINTMENT TOPICAL at 12:06

## 2024-06-29 RX ADMIN — Medication 10 ML: at 02:06

## 2024-06-29 RX ADMIN — ISOSORBIDE DINITRATE 10 MG: 10 TABLET ORAL at 02:06

## 2024-06-29 RX ADMIN — INSULIN ASPART 2 UNITS: 100 INJECTION, SOLUTION INTRAVENOUS; SUBCUTANEOUS at 05:06

## 2024-06-29 NOTE — ASSESSMENT & PLAN NOTE
Patient's anemia is currently uncontrolled. Has 1 u PRBC ordered. Etiology likely d/t Iron deficiency  Current CBC reviewed-   Lab Results   Component Value Date    HGB 7.4 (L) 06/29/2024    HCT 23.7 (L) 06/29/2024     Monitor serial CBC and transfuse if patient becomes hemodynamically unstable, symptomatic or H/H drops below 7/21.  -attempted blood transfusion yesterday however patient developed chest transfusion was stopped.  Will order 1 pack of packed red blood cells this morning and premedicate prior.  Orders for p.r.n. Lasix to be given in between units.  --consulted GI.  --s/p endoscopy on 6/28:  Impression:            - Normal esophagus.                          - Erosive gastropathy with no stigmata of recent                          bleeding. Biopsied.                          - Normal examined duodenum.   Recommendation:        - [facility] to [facility] for ongoing care.                          - Resume previous diet.                          - Continue present medications.                          - Await pathology results.                          - Observe patient's clinical course.                          - Perform a colonoscopy at appointment to be                          scheduled.

## 2024-06-29 NOTE — ASSESSMENT & PLAN NOTE
Pressure injury of buttock, stage 4   Pressure injury of right heel, stage 4     - Appreciate IP wound care  - Pressure injury prevention interventions - waffle overlay and heel boot  - Vashe wet-to-dry to bilateral buttocks, R buttock, and R heel wound BID  - Mepilex border to L posterior thigh/gluteal fold 3x/week  - Podiatry consulted for R heel wound   --also noted with a bilateral buttock full thickness pressure injury  --Right heel and sacrum CT ordered to r/o OM  --appreciate cardiology's evaluation

## 2024-06-29 NOTE — PLAN OF CARE
AAO,VSS,taken down to CT of sacrum this shift,incontinence care and turning prn,pt refuses most turning, wound care to sacrum as ordered,tylenol po for c/o pain,poc reviewed,bed alarm set.

## 2024-06-29 NOTE — ASSESSMENT & PLAN NOTE
Chronic, uncontrolled. Latest blood pressure and vitals reviewed-     Temp:  [98.5 °F (36.9 °C)-99.3 °F (37.4 °C)]   Pulse:  []   Resp:  [16-20]   BP: ()/(49-69)   SpO2:  [97 %-99 %] .   Home meds for hypertension were reviewed and noted below.   Hypertension Medications               furosemide (LASIX) 20 MG tablet Take 1 tablet (20 mg total) by mouth once daily.    NIFEdipine (PROCARDIA-XL) 90 MG (OSM) 24 hr tablet Take 1 tablet (90 mg total) by mouth once daily.    bumetanide (BUMEX) 0.5 MG Tab Take 0.5 mg by mouth daily as needed.    bumetanide (BUMEX) 1 MG tablet Take by mouth. unknown    hydrALAZINE (APRESOLINE) 100 MG tablet Take by mouth. unknown    isosorbide dinitrate (ISORDIL) 10 MG tablet Take 1 tablet (10 mg total) by mouth 3 (three) times daily. Hold until follow up with a provider            While in the hospital, will manage blood pressure as follows; will resume meds as tolerated     Will utilize p.r.n. blood pressure medication only if patient's blood pressure greater than 160/100 and he develops symptoms such as worsening chest pain or shortness of breath.

## 2024-06-29 NOTE — ASSESSMENT & PLAN NOTE
Creatine stable for now. BMP reviewed- noted Estimated Creatinine Clearance: 87.3 mL/min (based on SCr of 0.8 mg/dL). according to latest data. Based on current GFR, CKD stage is stage 2 - GFR 60-89.  Monitor UOP and serial BMP and adjust therapy as needed. Renally dose meds. Avoid nephrotoxic medications and procedures.

## 2024-06-29 NOTE — PROGRESS NOTES
[facility] Medicine  Progress Note    Patient Name: Saji Castañeda  MRN: [id number]  Patient Class: IP- Inpatient   Admission Date: 6/26/2024  Length of Stay: 2 days  Attending Physician: Akira Li MD  Primary Care Provider: [facility] -        Subjective:     Principal Problem:Anemia        HPI:  Saji Castañeda is a 73 y/o M who has a pmh of Arthritis, Bacteremia due to Gram-negative bacteria, Diabetes mellitus, Diabetes mellitus, type 2, Hyperlipidemia, Hypertension, Osteomyelitis, and Palliative care encounter. presenting to the Emergency Department for referral. Patient was sent to the ED via EMS from [facility] for low blood count. Patient denies any chest pain, shortness on breath, weakness, fatigue, dizziness. His hemoglobin was 6.3--will order 1 u PRBC ordered. FOBT pending. Will admit to the [facility] service for further care.    Overview/Hospital Course:  No notes on file    Interval History:  Patient seen on a.m. rounds.  No distress noted.  Appreciate consultants on this case.  Continue antibiotics for osteomyelitis treatment.  Following cultures.  He is status post EGD.  Doing well    Review of Systems   Constitutional:  Positive for activity change, appetite change, fatigue and fever (Low-grade).   HENT:  Negative for trouble swallowing.    Respiratory:  Negative for cough.    Cardiovascular:  Negative for chest pain.   Gastrointestinal:  Negative for diarrhea, nausea and vomiting.   Genitourinary:  Negative for hematuria.   Musculoskeletal:  Positive for arthralgias and myalgias.   Skin:  Positive for wound.   Neurological:  Positive for weakness.   Psychiatric/Behavioral:  Negative for confusion.      Objective:     Vital Signs (Most Recent):  Temp: 99.3 °F (37.4 °C) (06/29/24 1128)  Pulse: 70 (06/29/24 1239)  Resp: 16 (06/29/24 1128)  BP: (!) 99/49 (06/29/24 1128)  SpO2: 99 % (06/29/24 1128) Vital Signs (24h Range):  Temp:  [98.5 °F (36.9 °C)-99.3 °F  (37.4 °C)] 99.3 °F (37.4 °C)  Pulse:  [] 70  Resp:  [16-20] 16  SpO2:  [97 %-99 %] 99 %  BP: ()/(49-69) 99/49     Weight: 87.5 kg (192 lb 14.4 oz)  Body mass index is 28.49 kg/m².     Physical Exam  Vitals and nursing note reviewed.   Constitutional:       Appearance: He is obese. He is ill-appearing.   HENT:      Head: Normocephalic and atraumatic.      Mouth/Throat:      Mouth: Mucous membranes are moist.   Eyes:      Extraocular Movements: Extraocular movements intact.   Cardiovascular:      Rate and Rhythm: Normal rate.      Pulses: Normal pulses.      Heart sounds: Normal heart sounds.   Pulmonary:      Effort: Pulmonary effort is normal.      Breath sounds: Normal breath sounds.   Abdominal:      General: Bowel sounds are normal. There is no distension.      Palpations: Abdomen is soft.      Tenderness: There is no abdominal tenderness. There is no guarding.   Musculoskeletal:      Cervical back: Normal range of motion and neck supple.      Comments: Left AKA   Skin:     General: Skin is warm and dry.      Capillary Refill: Capillary refill takes 2 to 3 seconds.   Neurological:      Mental Status: He is alert and oriented to person, place, and time. Mental status is at baseline.      Motor: No weakness.   Psychiatric:         Mood and Affect: Mood normal.         Behavior: Behavior normal.                Significant Labs: All pertinent labs within the past 24 hours have been reviewed.    Significant Imaging: I have reviewed all pertinent imaging results/findings within the past 24 hours.    Assessment/Plan:      * Anemia  Patient's anemia is currently uncontrolled. Has 1 u PRBC ordered. Etiology likely d/t Iron deficiency  Current CBC reviewed-   Lab Results   Component Value Date    HGB 7.4 (L) 06/29/2024    HCT 23.7 (L) 06/29/2024     Monitor serial CBC and transfuse if patient becomes hemodynamically unstable, symptomatic or H/H drops below 7/21.  -attempted blood transfusion yesterday however  patient developed chest transfusion was stopped.  Will order 1 pack of packed red blood cells this morning and premedicate prior.  Orders for p.r.n. Lasix to be given in between units.  --consulted GI.  --s/p endoscopy on 6/28:  Impression:            - Normal esophagus.                          - Erosive gastropathy with no stigmata of recent                          bleeding. Biopsied.                          - Normal examined duodenum.   Recommendation:        - [facility] to [facility] for [facility] care.                          - Resume previous diet.                          - Continue present medications.                          - Await pathology results.                          - Observe patient's clinical course.                          - Perform a colonoscopy at appointment to be                          scheduled.     Pressure injury of ankle, unstageable-lateral  Pressure injury of buttock, stage 4   Pressure injury of right heel, stage 4     - Appreciate IP wound care  - Pressure injury prevention interventions - waffle overlay and heel boot  - Vashe wet-to-dry to bilateral buttocks, R buttock, and R heel wound BID  - Mepilex border to L posterior thigh/gluteal fold 3x/week  - Podiatry consulted for R heel wound   --also noted with a bilateral buttock full thickness pressure injury  --Right heel and sacrum CT ordered to r/o OM  --appreciate cardiology's evaluation      Wounds, multiple  -patient with wounds to his sacrum and heel  -wound care consulted  -Ulcer care ordered      Chronic anticoagulation  Holding OAC 2/2 GI bleed      Stage 3b chronic kidney disease  Creatine stable for now. BMP reviewed- noted Estimated Creatinine Clearance: 87.3 mL/min (based on SCr of 0.8 mg/dL). according to latest data. Based on current GFR, CKD stage is stage 2 - GFR 60-89.  Monitor UOP and serial BMP and adjust therapy as needed. Renally dose meds. Avoid nephrotoxic medications and  procedures.    Paroxysmal atrial fibrillation  Patient with trial fibrillation which is uncontrolled currently with Calcium Channel Blocker. Patient is currently in atrial fibrillation.[id number] Score: 4. . Anticoagulation on hold 2/2 GI bleed.    Osteomyelitis of right lower extremity  - Consulted ID  - IV abx ordered---vancomycin and zosyn  - Blood cultures from 6/26 with no growth to date        Chronic deep vein thrombosis (DVT) of right upper extremity  -holding anticoagulation in the setting of GI bleed  -follow      Peripheral arterial disease  PVD (peripheral vascular disease)    -holding ASA and anticoagulation at this time. Will follow       Type 2 diabetes mellitus, with long-term current use of insulin    Patient's FSGs are uncontrolled due to hyperglycemia on current medication regimen.  Last A1c reviewed-   Lab Results   Component Value Date    HGBA1C 6.8 (H) 06/26/2024     Most recent fingerstick glucose reviewed-   Recent Labs   Lab 06/28/24  1531 06/28/24  2101 06/29/24  0535 06/29/24  1155   POCTGLUCOSE 120* 184* 239* 243*       Current correctional scale  Low  Maintain anti-hyperglycemic dose as follows-   Antihyperglycemics (From admission, onward)      Start     Stop Route Frequency Ordered    06/26/24 1552  insulin aspart U-100 pen 0-5 Units         -- SubQ Before meals & nightly PRN 06/26/24 1453          Hold Oral hypoglycemics while patient is in the hospital.      Essential hypertension  Chronic, uncontrolled. Latest blood pressure and vitals reviewed-     Temp:  [98.5 °F (36.9 °C)-99.3 °F (37.4 °C)]   Pulse:  []   Resp:  [16-20]   BP: ()/(49-69)   SpO2:  [97 %-99 %] .   Home meds for hypertension were reviewed and noted below.   Hypertension Medications               furosemide (LASIX) 20 MG tablet Take 1 tablet (20 mg total) by mouth once daily.    NIFEdipine (PROCARDIA-XL) 90 MG (OSM) 24 hr tablet Take 1 tablet (90 mg total) by mouth once daily.    bumetanide (BUMEX) 0.5 MG  Tab Take 0.5 mg by mouth daily as needed.    bumetanide (BUMEX) 1 MG tablet Take by mouth. unknown    hydrALAZINE (APRESOLINE) 100 MG tablet Take by mouth. unknown    isosorbide dinitrate (ISORDIL) 10 MG tablet Take 1 tablet (10 mg total) by mouth 3 (three) times daily. Hold until follow up with a provider            While in the hospital, will manage blood pressure as follows; will resume meds as tolerated     Will utilize p.r.n. blood pressure medication only if patient's blood pressure greater than 160/100 and he develops symptoms such as worsening chest pain or shortness of breath.      VTE Risk Mitigation (From admission, onward)           Ordered     Reason for No Pharmacological VTE Prophylaxis  Once        Question:  Reasons:  Answer:  Risk of Bleeding    06/26/24 1453     IP VTE HIGH RISK PATIENT  Once         06/26/24 1453     Place sequential compression device  Until discontinued         06/26/24 1453                    Discharge Planning   OXANA:      Code Status: Full Code   Is the patient medically ready for discharge?:     Reason for patient still in hospital (select all that apply): Laboratory test, Treatment, Imaging, Consult recommendations, and PT / OT recommendations  Discharge Plan A: Return to nursing home                  Lo Melgar NP  Department of Hospital Medicine   [facility]

## 2024-06-29 NOTE — SUBJECTIVE & OBJECTIVE
Interval History:  Patient seen on a.m. rounds.  No distress noted.  Appreciate consultants on this case.  Continue antibiotics for osteomyelitis treatment.  Following cultures.  He is status post EGD.  Doing well    Review of Systems   Constitutional:  Positive for activity change, appetite change, fatigue and fever (Low-grade).   HENT:  Negative for trouble swallowing.    Respiratory:  Negative for cough.    Cardiovascular:  Negative for chest pain.   Gastrointestinal:  Negative for diarrhea, nausea and vomiting.   Genitourinary:  Negative for hematuria.   Musculoskeletal:  Positive for arthralgias and myalgias.   Skin:  Positive for wound.   Neurological:  Positive for weakness.   Psychiatric/Behavioral:  Negative for confusion.      Objective:     Vital Signs (Most Recent):  Temp: 99.3 °F (37.4 °C) (06/29/24 1128)  Pulse: 70 (06/29/24 1239)  Resp: 16 (06/29/24 1128)  BP: (!) 99/49 (06/29/24 1128)  SpO2: 99 % (06/29/24 1128) Vital Signs (24h Range):  Temp:  [98.5 °F (36.9 °C)-99.3 °F (37.4 °C)] 99.3 °F (37.4 °C)  Pulse:  [] 70  Resp:  [16-20] 16  SpO2:  [97 %-99 %] 99 %  BP: ()/(49-69) 99/49     Weight: 87.5 kg (192 lb 14.4 oz)  Body mass index is 28.49 kg/m².     Physical Exam  Vitals and nursing note reviewed.   Constitutional:       Appearance: He is obese. He is ill-appearing.   HENT:      Head: Normocephalic and atraumatic.      Mouth/Throat:      Mouth: Mucous membranes are moist.   Eyes:      Extraocular Movements: Extraocular movements intact.   Cardiovascular:      Rate and Rhythm: Normal rate.      Pulses: Normal pulses.      Heart sounds: Normal heart sounds.   Pulmonary:      Effort: Pulmonary effort is normal.      Breath sounds: Normal breath sounds.   Abdominal:      General: Bowel sounds are normal. There is no distension.      Palpations: Abdomen is soft.      Tenderness: There is no abdominal tenderness. There is no guarding.   Musculoskeletal:      Cervical back: Normal range of  motion and neck supple.      Comments: Left AKA   Skin:     General: Skin is warm and dry.      Capillary Refill: Capillary refill takes 2 to 3 seconds.   Neurological:      Mental Status: He is alert and oriented to person, place, and time. Mental status is at baseline.      Motor: No weakness.   Psychiatric:         Mood and Affect: Mood normal.         Behavior: Behavior normal.                Significant Labs: All pertinent labs within the past 24 hours have been reviewed.    Significant Imaging: I have reviewed all pertinent imaging results/findings within the past 24 hours.

## 2024-06-29 NOTE — ASSESSMENT & PLAN NOTE
- Consulted ID  - IV abx ordered---vancomycin and zosyn  - Blood cultures from 6/26 with no growth to date

## 2024-06-29 NOTE — ASSESSMENT & PLAN NOTE
Patient with trial fibrillation which is uncontrolled currently with Calcium Channel Blocker. Patient is currently in atrial fibrillation.[id number] Score: 4. . Anticoagulation on hold 2/2 GI bleed.

## 2024-06-29 NOTE — ASSESSMENT & PLAN NOTE
Patient's FSGs are uncontrolled due to hyperglycemia on current medication regimen.  Last A1c reviewed-   Lab Results   Component Value Date    HGBA1C 6.8 (H) 06/26/2024     Most recent fingerstick glucose reviewed-   Recent Labs   Lab 06/28/24  1531 06/28/24  2101 06/29/24  0535 06/29/24  1155   POCTGLUCOSE 120* 184* 239* 243*       Current correctional scale  Low  Maintain anti-hyperglycemic dose as follows-   Antihyperglycemics (From admission, onward)    Start     Stop Route Frequency Ordered    06/26/24 1552  insulin aspart U-100 pen 0-5 Units         -- SubQ Before meals & nightly PRN 06/26/24 1453        Hold Oral hypoglycemics while patient is in the hospital.

## 2024-06-30 LAB
ANION GAP SERPL CALC-SCNC: 9 MMOL/L (ref 8–16)
BASOPHILS # BLD AUTO: 0.04 K/UL (ref 0–0.2)
BASOPHILS NFR BLD: 0.3 % (ref 0–1.9)
BUN SERPL-MCNC: 7 MG/DL (ref 8–23)
CALCIUM SERPL-MCNC: 8.3 MG/DL (ref 8.7–10.5)
CHLORIDE SERPL-SCNC: 99 MMOL/L (ref 95–110)
CO2 SERPL-SCNC: 31 MMOL/L (ref 23–29)
CREAT SERPL-MCNC: 0.8 MG/DL (ref 0.5–1.4)
DIFFERENTIAL METHOD BLD: ABNORMAL
EOSINOPHIL # BLD AUTO: 0.8 K/UL (ref 0–0.5)
EOSINOPHIL NFR BLD: 5 % (ref 0–8)
ERYTHROCYTE [DISTWIDTH] IN BLOOD BY AUTOMATED COUNT: 18.5 % (ref 11.5–14.5)
EST. GFR  (NO RACE VARIABLE): >60 ML/MIN/1.73 M^2
GLUCOSE SERPL-MCNC: 166 MG/DL (ref 70–110)
HCT VFR BLD AUTO: 25.5 % (ref 40–54)
HGB BLD-MCNC: 7.9 G/DL (ref 14–18)
IMM GRANULOCYTES # BLD AUTO: 0.05 K/UL (ref 0–0.04)
IMM GRANULOCYTES NFR BLD AUTO: 0.3 % (ref 0–0.5)
LYMPHOCYTES # BLD AUTO: 1.3 K/UL (ref 1–4.8)
LYMPHOCYTES NFR BLD: 8.4 % (ref 18–48)
MAGNESIUM SERPL-MCNC: 2 MG/DL (ref 1.6–2.6)
MCH RBC QN AUTO: 25.5 PG (ref 27–31)
MCHC RBC AUTO-ENTMCNC: 31 G/DL (ref 32–36)
MCV RBC AUTO: 82 FL (ref 82–98)
MONOCYTES # BLD AUTO: 0.5 K/UL (ref 0.3–1)
MONOCYTES NFR BLD: 3.5 % (ref 4–15)
NEUTROPHILS # BLD AUTO: 12.7 K/UL (ref 1.8–7.7)
NEUTROPHILS NFR BLD: 82.5 % (ref 38–73)
NRBC BLD-RTO: 0 /100 WBC
PLATELET # BLD AUTO: 510 K/UL (ref 150–450)
PMV BLD AUTO: 8.3 FL (ref 9.2–12.9)
POCT GLUCOSE: 162 MG/DL (ref 70–110)
POCT GLUCOSE: 176 MG/DL (ref 70–110)
POCT GLUCOSE: 187 MG/DL (ref 70–110)
POCT GLUCOSE: 208 MG/DL (ref 70–110)
POTASSIUM SERPL-SCNC: 3.5 MMOL/L (ref 3.5–5.1)
RBC # BLD AUTO: 3.1 M/UL (ref 4.6–6.2)
SODIUM SERPL-SCNC: 139 MMOL/L (ref 136–145)
VANCOMYCIN TROUGH SERPL-MCNC: 27 UG/ML (ref 10–22)
WBC # BLD AUTO: 15.34 K/UL (ref 3.9–12.7)

## 2024-06-30 PROCEDURE — C9113 INJ PANTOPRAZOLE SODIUM, VIA: HCPCS | Performed by: NURSE PRACTITIONER

## 2024-06-30 PROCEDURE — 99222 1ST HOSP IP/OBS MODERATE 55: CPT | Mod: ,,, | Performed by: INTERNAL MEDICINE

## 2024-06-30 PROCEDURE — 83735 ASSAY OF MAGNESIUM: CPT | Performed by: NURSE PRACTITIONER

## 2024-06-30 PROCEDURE — 80202 ASSAY OF VANCOMYCIN: CPT | Performed by: STUDENT IN AN ORGANIZED HEALTH CARE EDUCATION/TRAINING PROGRAM

## 2024-06-30 PROCEDURE — 63600175 PHARM REV CODE 636 W HCPCS: Performed by: NURSE PRACTITIONER

## 2024-06-30 PROCEDURE — 36415 COLL VENOUS BLD VENIPUNCTURE: CPT | Performed by: STUDENT IN AN ORGANIZED HEALTH CARE EDUCATION/TRAINING PROGRAM

## 2024-06-30 PROCEDURE — 85025 COMPLETE CBC W/AUTO DIFF WBC: CPT | Performed by: NURSE PRACTITIONER

## 2024-06-30 PROCEDURE — 21400001 HC TELEMETRY ROOM

## 2024-06-30 PROCEDURE — 25000003 PHARM REV CODE 250: Performed by: NURSE PRACTITIONER

## 2024-06-30 PROCEDURE — 99900035 HC TECH TIME PER 15 MIN (STAT)

## 2024-06-30 PROCEDURE — A4216 STERILE WATER/SALINE, 10 ML: HCPCS | Performed by: NURSE PRACTITIONER

## 2024-06-30 PROCEDURE — 36415 COLL VENOUS BLD VENIPUNCTURE: CPT | Mod: XB | Performed by: NURSE PRACTITIONER

## 2024-06-30 PROCEDURE — 80048 BASIC METABOLIC PNL TOTAL CA: CPT | Performed by: NURSE PRACTITIONER

## 2024-06-30 RX ADMIN — PIPERACILLIN SODIUM AND TAZOBACTAM SODIUM 4.5 G: 4; .5 INJECTION, POWDER, LYOPHILIZED, FOR SOLUTION INTRAVENOUS at 08:06

## 2024-06-30 RX ADMIN — PIPERACILLIN SODIUM AND TAZOBACTAM SODIUM 4.5 G: 4; .5 INJECTION, POWDER, LYOPHILIZED, FOR SOLUTION INTRAVENOUS at 03:06

## 2024-06-30 RX ADMIN — GABAPENTIN 300 MG: 300 CAPSULE ORAL at 08:06

## 2024-06-30 RX ADMIN — Medication 10 ML: at 09:06

## 2024-06-30 RX ADMIN — PANTOPRAZOLE SODIUM 40 MG: 40 INJECTION, POWDER, LYOPHILIZED, FOR SOLUTION INTRAVENOUS at 09:06

## 2024-06-30 RX ADMIN — PANTOPRAZOLE SODIUM 40 MG: 40 INJECTION, POWDER, LYOPHILIZED, FOR SOLUTION INTRAVENOUS at 08:06

## 2024-06-30 RX ADMIN — DOCUSATE SODIUM AND SENNOSIDES 1 TABLET: 8.6; 5 TABLET, FILM COATED ORAL at 09:06

## 2024-06-30 RX ADMIN — ISOSORBIDE DINITRATE 10 MG: 10 TABLET ORAL at 09:06

## 2024-06-30 RX ADMIN — ISOSORBIDE DINITRATE 10 MG: 10 TABLET ORAL at 08:06

## 2024-06-30 RX ADMIN — TAMSULOSIN HYDROCHLORIDE 0.4 MG: 0.4 CAPSULE ORAL at 08:06

## 2024-06-30 RX ADMIN — FERROUS SULFATE TAB 325 MG (65 MG ELEMENTAL FE) 1 EACH: 325 (65 FE) TAB at 08:06

## 2024-06-30 RX ADMIN — HYDROCODONE BITARTRATE AND ACETAMINOPHEN 1 TABLET: 10; 325 TABLET ORAL at 05:06

## 2024-06-30 RX ADMIN — INSULIN ASPART 3 UNITS: 100 INJECTION, SOLUTION INTRAVENOUS; SUBCUTANEOUS at 06:06

## 2024-06-30 RX ADMIN — ISOSORBIDE DINITRATE 10 MG: 10 TABLET ORAL at 03:06

## 2024-06-30 RX ADMIN — MUPIROCIN: 20 OINTMENT TOPICAL at 08:06

## 2024-06-30 RX ADMIN — GABAPENTIN 300 MG: 300 CAPSULE ORAL at 09:06

## 2024-06-30 RX ADMIN — MUPIROCIN: 20 OINTMENT TOPICAL at 09:06

## 2024-06-30 NOTE — SUBJECTIVE & OBJECTIVE
Subjective:     Interval History: NAEON, NAD, VSS. Patient is afebrile, dressings are clean/dry/ and intact.   New pedal complaints: None  New results: Osteomyelitis of right posterior calcaneus.       Scheduled Meds:   ferrous sulfate  1 tablet Oral Daily    gabapentin  300 mg Oral BID    isosorbide dinitrate  10 mg Oral TID    mupirocin   Nasal BID    pantoprazole  40 mg Intravenous BID    piperacillin-tazobactam (Zosyn) IV (PEDS and ADULTS) (extended infusion is not appropriate)  4.5 g Intravenous Q8H    senna-docusate 8.6-50 mg  1 tablet Oral BID    sodium chloride 0.9%  10 mL Intravenous Q8H    tamsulosin  0.4 mg Oral Daily    vancomycin (VANCOCIN) IV (PEDS and ADULTS)  1,250 mg Intravenous Q12H     Continuous Infusions:  PRN Meds:  Current Facility-Administered Medications:     0.9%  NaCl infusion (for blood administration), , Intravenous, Q24H PRN    0.9%  NaCl infusion (for blood administration), , Intravenous, Q24H PRN    acetaminophen, 650 mg, Oral, Q4H PRN    albuterol-ipratropium, 3 mL, Nebulization, Q4H PRN    aluminum-magnesium hydroxide-simethicone, 30 mL, Oral, QID PRN    dextrose 10%, 12.5 g, Intravenous, PRN    dextrose 10%, 25 g, Intravenous, PRN    diphenhydrAMINE, 25 mg, Oral, Q6H PRN    furosemide (LASIX) injection, 20 mg, Intravenous, PRN    glucagon (human recombinant), 1 mg, Intramuscular, PRN    glucose, 16 g, Oral, PRN    glucose, 24 g, Oral, PRN    HYDROcodone-acetaminophen, 1 tablet, Oral, Q6H PRN    insulin aspart U-100, 0-5 Units, Subcutaneous, QID (AC + HS) PRN    melatonin, 6 mg, Oral, Nightly PRN    morphine, 1 mg, Intravenous, Q6H PRN    ondansetron, 8 mg, Oral, Q8H PRN    ondansetron, 4 mg, Intravenous, Q8H PRN    simethicone, 1 tablet, Oral, QID PRN    Pharmacy to dose Vancomycin consult, , , Once **AND** vancomycin - pharmacy to dose, , Intravenous, pharmacy to manage frequency    Review of Systems   Constitutional:  Negative for chills and fever.   Respiratory:  Negative for  cough and shortness of breath.    Cardiovascular:  Negative for chest pain.   Gastrointestinal:  Negative for constipation, diarrhea, nausea and vomiting.   Skin:  Positive for wound.     Objective:     Vital Signs (Most Recent):  Temp: 98.4 °F (36.9 °C) (06/1949)  Pulse: 72 (06/1950)  Resp: 20 (06/1949)  BP: (!) 144/65 (06/1950)  SpO2: 100 % (06/1949) Vital Signs (24h Range):  Temp:  [98.3 °F (36.8 °C)-99.3 °F (37.4 °C)] 98.4 °F (36.9 °C)  Pulse:  [] 72  Resp:  [16-22] 20  SpO2:  [97 %-100 %] 100 %  BP: ()/(49-65) 144/65     Weight: 87.5 kg (192 lb 14.4 oz)  Body mass index is 28.49 kg/m².    Foot Exam    General  General Appearance: appears stated age and healthy   Orientation: alert and oriented to person, place, and time   Affect: appropriate       Right Foot/Ankle     Inspection and Palpation  Ecchymosis: none  Tenderness: none   Swelling: none   Skin Exam: skin intact;     Neurovascular  Dorsalis pedis: 1+  Posterior tibial: absent  Saphenous nerve sensation: diminished  Tibial nerve sensation: diminished  Superficial peroneal nerve sensation: diminished  Deep peroneal nerve sensation: diminished  Sural nerve sensation: diminished    Comments  No malodor noted.     Left Foot/Ankle      Inspection and Palpation  Ecchymosis: none  Tenderness: none   Swelling: none   Skin Exam: skin intact;     Neurovascular  Dorsalis pedis: 1+  Posterior tibial: absent  Saphenous nerve sensation: diminished  Tibial nerve sensation: diminished  Superficial peroneal nerve sensation: diminished  Deep peroneal nerve sensation: diminished  Sural nerve sensation: diminished          Right heel.   Laboratory:  A1C:   Recent Labs   Lab 03/05/24  0900 06/26/24  1200   HGBA1C >14.0* 6.8*     CBC:   Recent Labs   Lab 06/29/24  0639   WBC 11.91   RBC 2.92*   HGB 7.4*   HCT 23.7*   *   MCV 81*   MCH 25.3*   MCHC 31.2*     CMP:   Recent Labs   Lab 06/26/24  1200 06/27/24  0527 06/29/24  0639  "  *   < > 204*   CALCIUM 8.8   < > 7.9*   ALBUMIN 1.6*  --   --    PROT 6.7  --   --       < > 138   K 3.0*   < > 3.5   CO2 33*   < > 30*   CL 98   < > 100   BUN 13   < > 8   CREATININE 0.9   < > 0.8   ALKPHOS 79  --   --    ALT 17  --   --    AST 26  --   --    BILITOT 0.2  --   --     < > = values in this interval not displayed.     CRP: No results for input(s): "CRP" in the last 168 hours.  ESR: No results for input(s): "SEDRATE" in the last 168 hours.  Wound Cultures:   Recent Labs   Lab 03/06/24  1439   LABAERO PROTEUS MIRABILIS  Few  *  ENTEROBACTER CLOACAE  Few  Skin bossman also present  *     Microbiology Results (last 7 days)       Procedure Component Value Units Date/Time    Blood culture [[id number]] Collected: 06/28/24 2252    Order Status: Completed Specimen: Blood from Peripheral, Hand, Right Updated: 06/29/24 1715     Blood Culture, Routine No Growth to date    Narrative:      Collection has been rescheduled by [email or web address] at 06/28/2024 12:19 Reason:   Unable to collect .  Collection has been rescheduled by KML at 06/28/2024 16:08 Reason:   Unable to collect notified nurse Ludmila  Collection has been rescheduled by [email or web address] at 06/28/2024 12:19 Reason:   Unable to collect .  Collection has been rescheduled by KML at 06/28/2024 16:08 Reason:   Unable to collect notified nurse Ludmila    Blood culture [[id number]] Collected: 06/28/24 2251    Order Status: Completed Specimen: Blood from Peripheral, Hand, Right Updated: 06/29/24 1715     Blood Culture, Routine No Growth to date    Narrative:      Collection has been rescheduled by [email or web address] at 06/28/2024 12:19 Reason:   Unable to collect .  Collection has been rescheduled by KML at 06/28/2024 16:08 Reason:   Unable to collect notified nurse Ludmila  Collection has been rescheduled by [email or web address] at 06/28/2024 12:19 Reason:   Unable to collect .  Collection has been rescheduled by KML at 06/28/2024 16:08 Reason:   Unable to collect notified nurse Ludmila    Blood culture " [[id number]] Collected: 06/26/24 2344    Order Status: Completed Specimen: Blood Updated: 06/29/24 1212     Blood Culture, Routine No Growth to date      No Growth to date      No Growth to date    Blood culture [[id number]] Collected: 06/26/24 2344    Order Status: Completed Specimen: Blood Updated: 06/29/24 1212     Blood Culture, Routine No Growth to date      No Growth to date      No Growth to date          Specimen (24h ago, onward)      None            Diagnostic Results:  I have reviewed all pertinent imaging results/findings within the past 24 hours.

## 2024-06-30 NOTE — ASSESSMENT & PLAN NOTE
Patient's anemia is currently uncontrolled. Has 1 u PRBC ordered. Etiology likely d/t Iron deficiency  Current CBC reviewed-   Lab Results   Component Value Date    HGB 7.9 (L) 06/30/2024    HCT 25.5 (L) 06/30/2024     Monitor serial CBC and transfuse if patient becomes hemodynamically unstable, symptomatic or H/H drops below 7/21.  -attempted blood transfusion yesterday however patient developed chest transfusion was stopped.  Will order 1 pack of packed red blood cells this morning and premedicate prior.  Orders for p.r.n. Lasix to be given in between units.  --consulted GI.  --s/p endoscopy on 6/28:  Impression:            - Normal esophagus.                          - Erosive gastropathy with no stigmata of recent                          bleeding. Biopsied.                          - Normal examined duodenum.   Recommendation:        - [facility] patient to [facility] for ongoing care.                          - Resume previous diet.                          - Continue present medications.                          - Await pathology results.                          - Observe patient's clinical course.                          - Perform a colonoscopy at appointment to be                          scheduled.

## 2024-06-30 NOTE — ANESTHESIA POSTPROCEDURE EVALUATION
Anesthesia Post Evaluation    Patient: Saji Castañeda    Procedure(s) Performed: Procedure(s) (LRB):  EGD (ESOPHAGOGASTRODUODENOSCOPY) (N/A)    Final Anesthesia Type: general      Patient location during evaluation: GI PACU  Patient participation: Yes- Able to Participate  Level of consciousness: awake and alert, oriented and awake  Post-procedure vital signs: reviewed and stable  Pain management: adequate  Airway patency: patent  WALTER mitigation strategies: Multimodal analgesia  PONV status at discharge: No PONV  Anesthetic complications: no      Cardiovascular status: blood pressure returned to baseline and hemodynamically stable  Respiratory status: unassisted and spontaneous ventilation  Hydration status: euvolemic  Follow-up not needed.              Vitals Value Taken Time   /59 06/30/24 1537   Temp 37.1 °C (98.8 °F) 06/30/24 1537   Pulse 67 06/30/24 1604   Resp 16 06/30/24 1537   SpO2 95 % 06/30/24 1537         Event Time   Out of Recovery 06/28/2024 15:14:00         Pain/Wing Score: Pain Rating Prior to Med Admin: 9 (6/29/2024  8:50 AM)  Pain Rating Post Med Admin: 0 (6/29/2024  8:00 PM)  Wing Score: 10 (6/29/2024  8:00 PM)

## 2024-06-30 NOTE — ASSESSMENT & PLAN NOTE
- Consulted ID  - IV abx ordered---vancomycin and zosyn  - Blood cultures from 6/26 with no growth to date  -Appreciate IDs plan below:  -continue vanco and zosyn  -will need 6 weeks of therapy  -would benefit from bone biopsy with culture to help guide therapy, discuss with IR or podiatry

## 2024-06-30 NOTE — PROGRESS NOTES
[facility] Medicine  Progress Note    Patient Name: Saji Castañeda  MRN: [id number]  Patient Class: IP- Inpatient   Admission Date: 6/26/2024  Length of Stay: 3 days  Attending Physician: Akira Li MD  Primary Care Provider: [facility] -        Subjective:     Principal Problem:Anemia        HPI:  Saji Castañeda is a 73 y/o M who has a pmh of Arthritis, Bacteremia due to Gram-negative bacteria, Diabetes mellitus, Diabetes mellitus, type 2, Hyperlipidemia, Hypertension, Osteomyelitis, and Palliative care encounter. presenting to the Emergency Department for referral. Patient was sent to the ED via EMS from [facility] for low blood count. Patient denies any chest pain, shortness on breath, weakness, fatigue, dizziness. His hemoglobin was 6.3--will order 1 u PRBC ordered. FOBT pending. Will admit to the [facility] service for further care.    Overview/Hospital Course:  No notes on file    Interval History:  Patient seen on a.m. rounds.  No distress noted.  Appreciate consultants on this case.  Continue antibiotics for osteomyelitis treatment.  Following cultures.  He is status post EGD.  Doing well. Appreciate ID's eval and recs.    Review of Systems   Constitutional:  Positive for activity change, appetite change, fatigue and fever (Low-grade).   HENT:  Negative for trouble swallowing.    Respiratory:  Negative for cough.    Cardiovascular:  Negative for chest pain.   Gastrointestinal:  Negative for diarrhea, nausea and vomiting.   Genitourinary:  Negative for hematuria.   Musculoskeletal:  Positive for arthralgias and myalgias.   Skin:  Positive for wound.   Neurological:  Positive for weakness.   Psychiatric/Behavioral:  Negative for confusion.      Objective:     Vital Signs (Most Recent):  Temp: 98.6 °F (37 °C) (06/30/24 1204)  Pulse: (!) 54 (06/30/24 1204)  Resp: 20 (06/30/24 1204)  BP: (!) 145/67 (06/30/24 1204)  SpO2: 98 % (06/30/24 1204) Vital Signs (24h  Range):  Temp:  [97.9 °F (36.6 °C)-98.7 °F (37.1 °C)] 98.6 °F (37 °C)  Pulse:  [] 54  Resp:  [14-22] 20  SpO2:  [97 %-100 %] 98 %  BP: (117-155)/(58-73) 145/67     Weight: 91.6 kg (201 lb 15.1 oz)  Body mass index is 29.82 kg/m².     Physical Exam  Vitals and nursing note reviewed.   Constitutional:       Appearance: He is obese. He is ill-appearing.   HENT:      Head: Normocephalic and atraumatic.      Mouth/Throat:      Mouth: Mucous membranes are moist.   Eyes:      Extraocular Movements: Extraocular movements intact.   Cardiovascular:      Rate and Rhythm: Normal rate.      Pulses: Normal pulses.      Heart sounds: Normal heart sounds.   Pulmonary:      Effort: Pulmonary effort is normal.      Breath sounds: Normal breath sounds.   Abdominal:      General: Bowel sounds are normal. There is no distension.      Palpations: Abdomen is soft.      Tenderness: There is no abdominal tenderness. There is no guarding.   Musculoskeletal:      Cervical back: Normal range of motion and neck supple.      Comments: Left AKA   Skin:     General: Skin is warm and dry.      Capillary Refill: Capillary refill takes 2 to 3 seconds.   Neurological:      Mental Status: He is alert and oriented to person, place, and time. Mental status is at baseline.      Motor: No weakness.   Psychiatric:         Mood and Affect: Mood normal.         Behavior: Behavior normal.                Significant Labs: All pertinent labs within the past 24 hours have been reviewed.    Significant Imaging: I have reviewed all pertinent imaging results/findings within the past 24 hours.    Assessment/Plan:      * Anemia  Patient's anemia is currently uncontrolled. Has 1 u PRBC ordered. Etiology likely d/t Iron deficiency  Current CBC reviewed-   Lab Results   Component Value Date    HGB 7.9 (L) 06/30/2024    HCT 25.5 (L) 06/30/2024     Monitor serial CBC and transfuse if patient becomes hemodynamically unstable, symptomatic or H/H drops below  7/21.  -attempted blood transfusion yesterday however patient developed chest transfusion was stopped.  Will order 1 pack of packed red blood cells this morning and premedicate prior.  Orders for p.r.n. Lasix to be given in between units.  --consulted GI.  --s/p endoscopy on 6/28:  Impression:            - Normal esophagus.                          - Erosive gastropathy with no stigmata of recent                          bleeding. Biopsied.                          - Normal examined duodenum.   Recommendation:        - Return patient to [facility] for ongoing care.                          - Resume previous diet.                          - Continue present medications.                          - Await pathology results.                          - Observe patient's clinical course.                          - Perform a colonoscopy at appointment to be                          scheduled.     Pressure injury of ankle, unstageable-lateral  Pressure injury of buttock, stage 4   Pressure injury of right heel, stage 4     - Appreciate IP wound care  - Pressure injury prevention interventions - waffle overlay and heel boot  - Vashe wet-to-dry to bilateral buttocks, R buttock, and R heel wound BID  - Mepilex border to L posterior thigh/gluteal fold 3x/week  - Podiatry consulted for R heel wound   --also noted with a bilateral buttock full thickness pressure injury  --Right heel and sacrum CT ordered to r/o OM  --appreciate cardiology's evaluation      Wounds, multiple  -patient with wounds to his sacrum and heel  -wound care consulted  -Ulcer care ordered      Chronic anticoagulation  Holding OAC 2/2 GI bleed      Stage 3b chronic kidney disease  Creatine stable for now. BMP reviewed- noted Estimated Creatinine Clearance: 89.3 mL/min (based on SCr of 0.8 mg/dL). according to latest data. Based on current GFR, CKD stage is stage 2 - GFR 60-89.  Monitor UOP and serial BMP and adjust therapy as needed. Renally dose meds.  Avoid nephrotoxic medications and procedures.    Paroxysmal atrial fibrillation  Patient with trial fibrillation which is uncontrolled currently with Calcium Channel Blocker. Patient is currently in atrial fibrillation.[id number] Score: 4. . Anticoagulation on hold 2/2 GI bleed.    Osteomyelitis of right lower extremity  - Consulted ID  - IV abx ordered---vancomycin and zosyn  - Blood cultures from 6/26 with no growth to date  -Appreciate IDs plan below:  -continue vanco and zosyn  -will need 6 weeks of therapy  -would benefit from bone biopsy with culture to help guide therapy, discuss with IR or podiatry        Chronic deep vein thrombosis (DVT) of right upper extremity  -holding anticoagulation in the setting of GI bleed  -follow      Peripheral arterial disease  PVD (peripheral vascular disease)    -holding ASA and anticoagulation at this time. Will follow       Type 2 diabetes mellitus, with long-term current use of insulin    Patient's FSGs are uncontrolled due to hyperglycemia on current medication regimen.  Last A1c reviewed-   Lab Results   Component Value Date    HGBA1C 6.8 (H) 06/26/2024     Most recent fingerstick glucose reviewed-   Recent Labs   Lab 06/29/24  1624 06/29/24  2056 06/30/24  0549 06/30/24  1200   POCTGLUCOSE 273* 271* 208* 162*       Current correctional scale  Low  Maintain anti-hyperglycemic dose as follows-   Antihyperglycemics (From admission, onward)      Start     Stop Route Frequency Ordered    06/26/24 1552  insulin aspart U-100 pen 0-5 Units         -- SubQ Before meals & nightly PRN 06/26/24 1453          Hold Oral hypoglycemics while patient is in the hospital.      Essential hypertension  Chronic, uncontrolled. Latest blood pressure and vitals reviewed-     Temp:  [97.9 °F (36.6 °C)-98.7 °F (37.1 °C)]   Pulse:  []   Resp:  [14-22]   BP: (117-155)/(58-73)   SpO2:  [97 %-100 %] .   Home meds for hypertension were reviewed and noted below.   Hypertension Medications                furosemide (LASIX) 20 MG tablet Take 1 tablet (20 mg total) by mouth once daily.    NIFEdipine (PROCARDIA-XL) 90 MG (OSM) 24 hr tablet Take 1 tablet (90 mg total) by mouth once daily.    bumetanide (BUMEX) 0.5 MG Tab Take 0.5 mg by mouth daily as needed.    bumetanide (BUMEX) 1 MG tablet Take by mouth. unknown    hydrALAZINE (APRESOLINE) 100 MG tablet Take by mouth. unknown    isosorbide dinitrate (ISORDIL) 10 MG tablet Take 1 tablet (10 mg total) by mouth 3 (three) times daily. Hold until follow up with a provider            While in the hospital, will manage blood pressure as follows; will resume meds as tolerated     Will utilize p.r.n. blood pressure medication only if patient's blood pressure greater than 160/100 and he develops symptoms such as worsening chest pain or shortness of breath.      VTE Risk Mitigation (From admission, onward)           Ordered     Reason for No Pharmacological VTE Prophylaxis  Once        Question:  Reasons:  Answer:  Risk of Bleeding    06/26/24 1453     IP VTE HIGH RISK PATIENT  Once         06/26/24 1453     Place sequential compression device  Until discontinued         06/26/24 1453                    Discharge Planning   OXANA:      Code Status: Full Code   Is the patient medically ready for discharge?:     Reason for patient still in hospital (select all that apply): Laboratory test, Treatment, Imaging, and Consult recommendations  Discharge Plan A: Return to nursing home                  Lo Melgar NP  Department of Hospital Medicine   [facility]

## 2024-06-30 NOTE — ASSESSMENT & PLAN NOTE
Saji Castañeda is a 74 y.o. male with s/p left AKA, and now right heel wound complicated by osteomyelitis.     - Dicussed at length conservative vs surgical treatment of OM. Answered patient questions. At this time patient amenable to conservative treatment only. Does not wish for any amputation or debridement. Explained biomechanical potential consequences and patient states he does not want to do anything to lose his right leg.   - Abx per primary  - Pain mgmt per primary  - Please reach out to podiatry for any questions or concerns.

## 2024-06-30 NOTE — HPI
73 y/o M who has a pmh of Arthritis, Bacteremia due to Gram-negative bacteria, Diabetes mellitus, Diabetes mellitus, type 2, Hyperlipidemia, Hypertension, Osteomyelitis, and Palliative care encounter presenting to the Emergency Department for referral. Patient was sent to the ED via EMS from [facility] for low blood count. Patient denies any chest pain, shortness on breath, weakness, fatigue, dizziness. ID is consulted for OM of right heel found on CT. On vanco and zosyn. He does not want amputation. Seen by cards and is not a revascularization candidate.

## 2024-06-30 NOTE — ASSESSMENT & PLAN NOTE
75 y/o M who has a pmh of Arthritis, Bacteremia due to Gram-negative bacteria, Diabetes mellitus, Diabetes mellitus, type 2, Hyperlipidemia, Hypertension, and left AKA who is admitted and found to have right heel osteo    -continue vanco and zosyn  -will need 6 weeks of therapy  -would benefit from bone biopsy with culture to help guide therapy, discuss with IR or podiatry

## 2024-06-30 NOTE — SUBJECTIVE & OBJECTIVE
Past Medical History:   Diagnosis Date    Arthritis     legs    Bacteremia due to Gram-negative bacteria 3/23/2021    Diabetes mellitus     Diabetes mellitus, type 2     Hyperlipidemia     Hypertension     Osteomyelitis     Palliative care encounter 5/24/2023       Past Surgical History:   Procedure Laterality Date    ABOVE-KNEE AMPUTATION Left 3/27/2024    Procedure: AMPUTATION, ABOVE KNEE;  Surgeon: Adal Arellano MD;  Location: [address];  Service: Vascular;  Laterality: Left;    ANGIOGRAPHY OF LOWER EXTREMITY N/A 2/3/2021    Procedure: Angiogram Extremity Bilateral;  Surgeon: Ernst Chacko MD;  Location: [address];  Service: Peripheral Vascular;  Laterality: N/A;  7.4 mintues fluoroscopy time  816.15 mGy  170.17 Gycm2    AORTOGRAPHY WITH EXTREMITY RUNOFF Bilateral 2/3/2021    Procedure: AORTOGRAM, WITH EXTREMITY RUNOFF;  Surgeon: Ernst Chacko MD;  Location: [address];  Service: Peripheral Vascular;  Laterality: Bilateral;    DEBRIDEMENT OF FOOT Left 2/23/2021    Procedure: DEBRIDEMENT, LEFT HEEL;  Surgeon: Mayra Schroeder DPM;  Location: [address];  Service: Podiatry;  Laterality: Left;    ESOPHAGOGASTRODUODENOSCOPY N/A 6/28/2024    Procedure: EGD (ESOPHAGOGASTRODUODENOSCOPY);  Surgeon: Adal Chandra MD;  Location: [facility] ENDO;  Service: Gastroenterology;  Laterality: N/A;    FOOT AMPUTATION  October 2010    left high midfoot amputation    IMPLANTATION OF LEADLESS PACEMAKER N/A 10/12/2023    Procedure: [id number];  Surgeon: VANESSA Delatorre MD;  Location: [facility] EP LAB;  Service: Cardiology;  Laterality: N/A;  AVB, [place], EH, [place]ES, [place] [id number]       Review of patient's allergies indicates:  No Known Allergies    Medications:  Medications Prior to Admission   Medication Sig    acetaminophen (TYLENOL) 325 MG tablet Take 650 mg by mouth every 6 (six) hours as needed for Pain.    acidophilus-pectin, citrus 100 million cell-10 mg  Cap Take 1 capsule by mouth 2 (two) times a day.    apixaban (ELIQUIS) 5 mg Tab Take 1 tablet (5 mg total) by mouth 2 (two) times daily.    arginine-glutamine-calcium HMB (HARITHA) 7-7-1.5 gram PwPk Take 1 packet by mouth 2 (two) times a day.    ascorbic acid, vitamin C, (VITAMIN C) 500 MG tablet Take 500 mg by mouth 2 (two) times daily.    ergocalciferol (ERGOCALCIFEROL) 50,000 unit Cap Take 1 capsule (50,000 Units total) by mouth every 7 days. for 10 doses (Patient taking differently: Take 50,000 Units by mouth Every Friday.)    ferrous sulfate 325 (65 FE) MG EC tablet Take 325 mg by mouth 2 (two) times daily.    furosemide (LASIX) 20 MG tablet Take 1 tablet (20 mg total) by mouth once daily.    gabapentin (NEURONTIN) 300 MG capsule Take 300 mg by mouth 2 (two) times daily.    insulin aspart U-100 (NOVOLOG) 100 unit/mL (3 mL) InPn pen Inject 5 Units into the skin 3 (three) times daily with meals.    insulin aspart U-100 (NOVOLOG) 100 unit/mL injection Inject 2-10 Units into the skin 3 (three) times daily before meals. 0-149=0  150-200: 2 units  201-250: 4 units  251-300: 6 units  301-350: 8 units  351-1000=10 units, NOTIFY MD NILDA KING U-100 INSULIN 100 unit/mL (3 mL) InPn pen Inject 12 Units into the skin 2 (two) times a day.    levoFLOXacin (LEVAQUIN) 750 MG tablet Take 750 mg by mouth once daily.    menthol-zinc oxide (CALMOSEPTINE) 0.44-20.6 % Oint Apply topically as needed.    multivitamin (THERAGRAN) per tablet Take 1 tablet by mouth once daily.    NIFEdipine (PROCARDIA-XL) 90 MG (OSM) 24 hr tablet Take 1 tablet (90 mg total) by mouth once daily.    oxyCODONE (ROXICODONE) 5 MG immediate release tablet Take 1 tablet (5 mg total) by mouth every 6 (six) hours as needed for Pain.    pantoprazole (PROTONIX) 40 MG tablet Take 40 mg by mouth once daily. Before breakfast    povidone-iodine (BETADINE) 10 % external solution Apply topically as needed for Wound Care.    SANTYL ointment Apply topically once  "daily. unknown    tamsulosin (FLOMAX) 0.4 mg Cap Take 0.4 mg by mouth once daily.    zinc sulfate (ZINCATE) 50 mg zinc (220 mg) capsule Take 220 mg by mouth every morning.    aspirin (ECOTRIN) 81 MG EC tablet Take 81 mg by mouth. unknown    BD AUTOSHIELD DUO PEN NEEDLE 30 gauge x 3/16" Ndle     BD ULTRA-FINE ADITYA PEN NEEDLE 32 gauge x 5/32" Ndle USE FOUR TIMES DAILY WITH MEALS AND NIGHTLY    blood sugar diagnostic (CONTOUR TEST STRIPS) Strp 1 strip by Misc.(Non-Drug; Combo Route) route 4 (four) times daily. Use to check blood glucose 4x daily    fluconazole (DIFLUCAN) 100 MG tablet Take 100 mg by mouth. unknown    flucytosine (ANCOBON) 500 mg Cap Take by mouth. unknown    hydrALAZINE (APRESOLINE) 100 MG tablet Take by mouth. unknown    lancets (MICROLET LANCET) Misc 1 each by Misc.(Non-Drug; Combo Route) route 4 (four) times daily. Use to check blood sugars 4x daily    TRUE METRIX GLUCOSE METER Misc      Antibiotics (From admission, onward)      Start     Stop Route Frequency Ordered    06/29/24 1245  mupirocin 2 % ointment         07/04/24 0859 Nasl 2 times daily 06/29/24 1137    06/28/24 1300  piperacillin-tazobactam (ZOSYN) 4.5 g in D5W 100 mL IVPB (MB+)         -- IV Every 8 hours (non-standard times) 06/28/24 1150    06/28/24 1249  vancomycin - pharmacy to dose  (vancomycin IVPB (PEDS and ADULTS))        Placed in "And" Linked Group    -- IV pharmacy to manage frequency 06/28/24 1150          Antifungals (From admission, onward)      None          Antivirals (From admission, onward)      None             Immunization History   Administered Date(s) Administered    COVID-19, MRNA, LN-S, PF (MODERNA FULL 0.5 ML DOSE) 05/24/2021, 05/24/2021, 07/13/2021, 07/13/2021    Influenza (FLUAD) - Quadrivalent - Adjuvanted - PF *Preferred* (65+) 12/21/2023    Influenza - Quadrivalent - PF *Preferred* (6 months and older) 10/08/2020    Influenza - Trivalent (ADULT) 10/08/2020, 09/21/2021, 09/15/2022    PPD Test 12/09/2020, " 03/02/2021, 06/21/2022, 09/19/2023, 03/18/2024    Pneumococcal Conjugate - 13 Valent 10/16/2018, 10/16/2018       Family History       Problem Relation (Age of Onset)    Cancer Brother    Diabetes Mother, Sister    Heart disease Mother    Stroke Sister          Social History     Socioeconomic History    Marital status: Single   Tobacco Use    Smoking status: Never    Smokeless tobacco: Never   Substance and Sexual Activity    Alcohol use: No     Comment: occassional    Drug use: No    Sexual activity: Not Currently   Social History Narrative    Not currently working; lives with family     Social Determinants of Health     Financial Resource Strain: Low Risk  (6/27/2024)    Overall Financial Resource Strain (CARDIA)     Difficulty of Paying Living Expenses: Not hard at all   Food Insecurity: No Food Insecurity (6/27/2024)    Hunger Vital Sign     Worried About Running Out of Food in the Last Year: Never true     Ran Out of Food in the Last Year: Never true   Transportation Needs: No Transportation Needs (6/27/2024)    TRANSPORTATION NEEDS     Transportation : No   Physical Activity: Inactive (6/27/2024)    Exercise Vital Sign     Days of Exercise per Week: 0 days     Minutes of Exercise per Session: 0 min   Stress: No Stress Concern Present (6/27/2024)    [place] [employer] - Occupational Stress Questionnaire     Feeling of Stress : Not at all   Housing Stability: Low Risk  (6/27/2024)    Housing Stability Vital Sign     Unable to Pay for Housing in the Last Year: No     Homeless in the Last Year: No     Review of Systems   Constitutional:  Positive for activity change, appetite change, fatigue and fever (Low-grade).   HENT:  Negative for trouble swallowing.    Respiratory:  Negative for cough.    Cardiovascular:  Negative for chest pain.   Gastrointestinal:  Negative for diarrhea, nausea and vomiting.   Genitourinary:  Negative for hematuria.   Musculoskeletal:  Positive for arthralgias and  myalgias.   Skin:  Positive for wound.   Neurological:  Positive for weakness.   Psychiatric/Behavioral:  Negative for confusion.      Objective:     Vital Signs (Most Recent):  Temp: 98.6 °F (37 °C) (06/30/24 1204)  Pulse: (!) 54 (06/30/24 1204)  Resp: 20 (06/30/24 1204)  BP: (!) 145/67 (06/30/24 1204)  SpO2: 98 % (06/30/24 1204) Vital Signs (24h Range):  Temp:  [97.9 °F (36.6 °C)-98.7 °F (37.1 °C)] 98.6 °F (37 °C)  Pulse:  [] 54  Resp:  [14-22] 20  SpO2:  [97 %-100 %] 98 %  BP: (117-155)/(58-73) 145/67     Weight: 91.6 kg (201 lb 15.1 oz)  Body mass index is 29.82 kg/m².    Estimated Creatinine Clearance: 89.3 mL/min (based on SCr of 0.8 mg/dL).     Physical Exam  Vitals and nursing note reviewed.   Constitutional:       Appearance: He is obese.   HENT:      Head: Normocephalic and atraumatic.      Mouth/Throat:      Mouth: Mucous membranes are moist.   Eyes:      Extraocular Movements: Extraocular movements intact.   Cardiovascular:      Rate and Rhythm: Normal rate.      Pulses: Normal pulses.      Heart sounds: Normal heart sounds.   Pulmonary:      Effort: Pulmonary effort is normal.      Breath sounds: Normal breath sounds.   Abdominal:      General: Bowel sounds are normal. There is no distension.      Palpations: Abdomen is soft.      Tenderness: There is no abdominal tenderness. There is no guarding.   Musculoskeletal:      Cervical back: Normal range of motion and neck supple.      Comments: Left AKA   Skin:     General: Skin is warm and dry.      Capillary Refill: Capillary refill takes 2 to 3 seconds.   Neurological:      Mental Status: He is alert and oriented to person, place, and time. Mental status is at baseline.      Motor: No weakness.   Psychiatric:         Mood and Affect: Mood normal.         Behavior: Behavior normal.          Significant Labs: All pertinent labs within the past 24 hours have been reviewed.    Significant Imaging: I have reviewed all pertinent imaging results/findings  within the past 24 hours.

## 2024-06-30 NOTE — ASSESSMENT & PLAN NOTE
Patient's FSGs are uncontrolled due to hyperglycemia on current medication regimen.  Last A1c reviewed-   Lab Results   Component Value Date    HGBA1C 6.8 (H) 06/26/2024     Most recent fingerstick glucose reviewed-   Recent Labs   Lab 06/29/24  1624 06/29/24  2056 06/30/24  0549 06/30/24  1200   POCTGLUCOSE 273* 271* 208* 162*       Current correctional scale  Low  Maintain anti-hyperglycemic dose as follows-   Antihyperglycemics (From admission, onward)    Start     Stop Route Frequency Ordered    06/26/24 1552  insulin aspart U-100 pen 0-5 Units         -- SubQ Before meals & nightly PRN 06/26/24 1453        Hold Oral hypoglycemics while patient is in the hospital.

## 2024-06-30 NOTE — CONSULTS
[place] - [facility] Surg  Infectious Disease  Consult Note    Patient Name: Saji Castañeda  MRN: [id number]  Admission Date: 6/26/2024  Hospital Length of Stay: 3 days  Attending Physician: Akira Li MD  Primary Care Provider: [facility] -     Isolation Status: No active isolations    Patient information was obtained from patient and past medical records.      Inpatient consult to Infectious Diseases  Consult performed by: Jaylan Arita MD  Consult ordered by: Lo Melgar NP  Reason for consult: OM      Assessment/Plan:     ID  Osteomyelitis of right lower extremity  75 y/o M who has a pmh of Arthritis, Bacteremia due to Gram-negative bacteria, Diabetes mellitus, Diabetes mellitus, type 2, Hyperlipidemia, Hypertension, and left AKA who is admitted and found to have right heel osteo    -continue vanco and zosyn  -will need 6 weeks of therapy  -would benefit from bone biopsy with culture to help guide therapy, discuss with IR or podiatry          Thank you for your consult. I will follow-up with patient. Please contact us if you have any additional questions.    Jaylan Arita MD  Infectious Disease  [place] - Med Surg    Subjective:     Principal Problem: Anemia    HPI: 75 y/o M who has a pmh of Arthritis, Bacteremia due to Gram-negative bacteria, Diabetes mellitus, Diabetes mellitus, type 2, Hyperlipidemia, Hypertension, Osteomyelitis, and Palliative care encounter presenting to the Emergency Department for referral. Patient was sent to the ED via EMS from [facility] for low blood count. Patient denies any chest pain, shortness on breath, weakness, fatigue, dizziness. ID is consulted for OM of right heel found on CT. On vanco and zosyn. He does not want amputation. Seen by cards and is not a revascularization candidate.     Past Medical History:   Diagnosis Date    Arthritis     legs    Bacteremia due to Gram-negative bacteria 3/23/2021    Diabetes mellitus     Diabetes  mellitus, type 2     Hyperlipidemia     Hypertension     Osteomyelitis     Palliative care encounter 5/24/2023       Past Surgical History:   Procedure Laterality Date    ABOVE-KNEE AMPUTATION Left 3/27/2024    Procedure: AMPUTATION, ABOVE KNEE;  Surgeon: Adal Arellano MD;  Location: [facility] OR [address] [place] [address];  Service: Vascular;  Laterality: Left;    ANGIOGRAPHY OF LOWER EXTREMITY N/A 2/3/2021    Procedure: Angiogram Extremity Bilateral;  Surgeon: Ernst Chacko MD;  Location: [address];  Service: Peripheral Vascular;  Laterality: N/A;  7.4 mintues fluoroscopy time  816.15 mGy  170.17 Gycm2    AORTOGRAPHY WITH EXTREMITY RUNOFF Bilateral 2/3/2021    Procedure: AORTOGRAM, WITH EXTREMITY RUNOFF;  Surgeon: Ernst Chacko MD;  Location: [address];  Service: Peripheral Vascular;  Laterality: Bilateral;    DEBRIDEMENT OF FOOT Left 2/23/2021    Procedure: DEBRIDEMENT, LEFT HEEL;  Surgeon: Mayra Schroeder DPM;  Location: [address];  Service: Podiatry;  Laterality: Left;    ESOPHAGOGASTRODUODENOSCOPY N/A 6/28/2024    Procedure: EGD (ESOPHAGOGASTRODUODENOSCOPY);  Surgeon: Adal Chandra MD;  Location: [facility];  Service: Gastroenterology;  Laterality: N/A;    FOOT AMPUTATION  October 2010    left high midfoot amputation    IMPLANTATION OF LEADLESS PACEMAKER N/A 10/12/2023    Procedure: [id number];  Surgeon: VANESSA Delatorre MD;  Location: [facility] EP LAB;  Service: Cardiology;  Laterality: N/A;  AVB, MICRA, EH, [place]ES, [place] [id number]       Review of patient's allergies indicates:  No Known Allergies    Medications:  Medications Prior to Admission   Medication Sig    acetaminophen (TYLENOL) 325 MG tablet Take 650 mg by mouth every 6 (six) hours as needed for Pain.    acidophilus-pectin, citrus 100 million cell-10 mg Cap Take 1 capsule by mouth 2 (two) times a day.    apixaban (ELIQUIS) 5 mg Tab Take 1 tablet (5 mg total) by mouth 2 (two) times daily.     arginine-glutamine-calcium HMB (HARITHA) 7-7-1.5 gram PwPk Take 1 packet by mouth 2 (two) times a day.    ascorbic acid, vitamin C, (VITAMIN C) 500 MG tablet Take 500 mg by mouth 2 (two) times daily.    ergocalciferol (ERGOCALCIFEROL) 50,000 unit Cap Take 1 capsule (50,000 Units total) by mouth every 7 days. for 10 doses (Patient taking differently: Take 50,000 Units by mouth Every Friday.)    ferrous sulfate 325 (65 FE) MG EC tablet Take 325 mg by mouth 2 (two) times daily.    furosemide (LASIX) 20 MG tablet Take 1 tablet (20 mg total) by mouth once daily.    gabapentin (NEURONTIN) 300 MG capsule Take 300 mg by mouth 2 (two) times daily.    insulin aspart U-100 (NOVOLOG) 100 unit/mL (3 mL) InPn pen Inject 5 Units into the skin 3 (three) times daily with meals.    insulin aspart U-100 (NOVOLOG) 100 unit/mL injection Inject 2-10 Units into the skin 3 (three) times daily before meals. 0-149=0  150-200: 2 units  201-250: 4 units  251-300: 6 units  301-350: 8 units  351-1000=10 units, NOTIFY MD Riri KING U-100 INSULIN 100 unit/mL (3 mL) InPn pen Inject 12 Units into the skin 2 (two) times a day.    levoFLOXacin (LEVAQUIN) 750 MG tablet Take 750 mg by mouth once daily.    menthol-zinc oxide (CALMOSEPTINE) 0.44-20.6 % Oint Apply topically as needed.    multivitamin (THERAGRAN) per tablet Take 1 tablet by mouth once daily.    NIFEdipine (PROCARDIA-XL) 90 MG (OSM) 24 hr tablet Take 1 tablet (90 mg total) by mouth once daily.    oxyCODONE (ROXICODONE) 5 MG immediate release tablet Take 1 tablet (5 mg total) by mouth every 6 (six) hours as needed for Pain.    pantoprazole (PROTONIX) 40 MG tablet Take 40 mg by mouth once daily. Before breakfast    povidone-iodine (BETADINE) 10 % external solution Apply topically as needed for Wound Care.    SANTYL ointment Apply topically once daily. unknown    tamsulosin (FLOMAX) 0.4 mg Cap Take 0.4 mg by mouth once daily.    zinc sulfate (ZINCATE) 50 mg zinc (220  "mg) capsule Take 220 mg by mouth every morning.    aspirin (ECOTRIN) 81 MG EC tablet Take 81 mg by mouth. unknown    BD AUTOSHIELD DUO PEN NEEDLE 30 gauge x 3/16" Ndle     BD ULTRA-FINE ADITYA PEN NEEDLE 32 gauge x 5/32" Ndle USE FOUR TIMES DAILY WITH MEALS AND NIGHTLY    blood sugar diagnostic (CONTOUR TEST STRIPS) Strp 1 strip by Misc.(Non-Drug; Combo Route) route 4 (four) times daily. Use to check blood glucose 4x daily    fluconazole (DIFLUCAN) 100 MG tablet Take 100 mg by mouth. unknown    flucytosine (ANCOBON) 500 mg Cap Take by mouth. unknown    hydrALAZINE (APRESOLINE) 100 MG tablet Take by mouth. unknown    lancets (MICROLET LANCET) Misc 1 each by Misc.(Non-Drug; Combo Route) route 4 (four) times daily. Use to check blood sugars 4x daily    TRUE METRIX GLUCOSE METER Misc      Antibiotics (From admission, onward)      Start     Stop Route Frequency Ordered    06/29/24 1245  mupirocin 2 % ointment         07/04/24 0859 Nasl 2 times daily 06/29/24 1137    06/28/24 1300  piperacillin-tazobactam (ZOSYN) 4.5 g in D5W 100 mL IVPB (MB+)         -- IV Every 8 hours (non-standard times) 06/28/24 1150    06/28/24 1249  vancomycin - pharmacy to dose  (vancomycin IVPB (PEDS and ADULTS))        Placed in "And" Linked Group    -- IV pharmacy to manage frequency 06/28/24 1150          Antifungals (From admission, onward)      None          Antivirals (From admission, onward)      None             Immunization History   Administered Date(s) Administered    COVID-19, MRNA, LN-S, PF (MODERNA FULL 0.5 ML DOSE) 05/24/2021, 05/24/2021, 07/13/2021, 07/13/2021    Influenza (FLUAD) - Quadrivalent - Adjuvanted - PF *Preferred* (65+) 12/21/2023    Influenza - Quadrivalent - PF *Preferred* (6 months and older) 10/08/2020    Influenza - Trivalent (ADULT) 10/08/2020, 09/21/2021, 09/15/2022    PPD Test 12/09/2020, 03/02/2021, 06/21/2022, 09/19/2023, 03/18/2024    Pneumococcal Conjugate - 13 Valent 10/16/2018, 10/16/2018 "       Family History       Problem Relation (Age of Onset)    Cancer Brother    Diabetes Mother, Sister    Heart disease Mother    Stroke Sister          Social History     Socioeconomic History    Marital status: Single   Tobacco Use    Smoking status: Never    Smokeless tobacco: Never   Substance and Sexual Activity    Alcohol use: No     Comment: occassional    Drug use: No    Sexual activity: Not Currently   Social History Narrative    Not currently working; lives with family     Social Determinants of Health     Financial Resource Strain: Low Risk  (6/27/2024)    Overall Financial Resource Strain (CARDIA)     Difficulty of Paying Living Expenses: Not hard at all   Food Insecurity: No Food Insecurity (6/27/2024)    Hunger Vital Sign     Worried About Running Out of Food in the Last Year: Never true     Ran Out of Food in the Last Year: Never true   Transportation Needs: No Transportation Needs (6/27/2024)    TRANSPORTATION NEEDS     Transportation : No   Physical Activity: Inactive (6/27/2024)    Exercise Vital Sign     Days of Exercise per Week: 0 days     Minutes of Exercise per Session: 0 min   Stress: No Stress Concern Present (6/27/2024)    [employer] [place] [employer] - Occupational Stress Questionnaire     Feeling of Stress : Not at all   Housing Stability: Low Risk  (6/27/2024)    Housing Stability Vital Sign     Unable to Pay for Housing in the Last Year: No     Homeless in the Last Year: No     Review of Systems   Constitutional:  Positive for activity change, appetite change, fatigue and fever (Low-grade).   HENT:  Negative for trouble swallowing.    Respiratory:  Negative for cough.    Cardiovascular:  Negative for chest pain.   Gastrointestinal:  Negative for diarrhea, nausea and vomiting.   Genitourinary:  Negative for hematuria.   Musculoskeletal:  Positive for arthralgias and myalgias.   Skin:  Positive for wound.   Neurological:  Positive for weakness.    Psychiatric/Behavioral:  Negative for confusion.      Objective:     Vital Signs (Most Recent):  Temp: 98.6 °F (37 °C) (06/30/24 1204)  Pulse: (!) 54 (06/30/24 1204)  Resp: 20 (06/30/24 1204)  BP: (!) 145/67 (06/30/24 1204)  SpO2: 98 % (06/30/24 1204) Vital Signs (24h Range):  Temp:  [97.9 °F (36.6 °C)-98.7 °F (37.1 °C)] 98.6 °F (37 °C)  Pulse:  [] 54  Resp:  [14-22] 20  SpO2:  [97 %-100 %] 98 %  BP: (117-155)/(58-73) 145/67     Weight: 91.6 kg (201 lb 15.1 oz)  Body mass index is 29.82 kg/m².    Estimated Creatinine Clearance: 89.3 mL/min (based on SCr of 0.8 mg/dL).     Physical Exam  Vitals and nursing note reviewed.   Constitutional:       Appearance: He is obese.   HENT:      Head: Normocephalic and atraumatic.      Mouth/Throat:      Mouth: Mucous membranes are moist.   Eyes:      Extraocular Movements: Extraocular movements intact.   Cardiovascular:      Rate and Rhythm: Normal rate.      Pulses: Normal pulses.      Heart sounds: Normal heart sounds.   Pulmonary:      Effort: Pulmonary effort is normal.      Breath sounds: Normal breath sounds.   Abdominal:      General: Bowel sounds are normal. There is no distension.      Palpations: Abdomen is soft.      Tenderness: There is no abdominal tenderness. There is no guarding.   Musculoskeletal:      Cervical back: Normal range of motion and neck supple.      Comments: Left AKA   Skin:     General: Skin is warm and dry.      Capillary Refill: Capillary refill takes 2 to 3 seconds.   Neurological:      Mental Status: He is alert and oriented to person, place, and time. Mental status is at baseline.      Motor: No weakness.   Psychiatric:         Mood and Affect: Mood normal.         Behavior: Behavior normal.          Significant Labs: All pertinent labs within the past 24 hours have been reviewed.    Significant Imaging: I have reviewed all pertinent imaging results/findings within the past 24 hours.

## 2024-06-30 NOTE — SUBJECTIVE & OBJECTIVE
Interval History:  Patient seen on a.m. rounds.  No distress noted.  Appreciate consultants on this case.  Continue antibiotics for osteomyelitis treatment.  Following cultures.  He is status post EGD.  Doing well. Appreciate ID's eval and recs.    Review of Systems   Constitutional:  Positive for activity change, appetite change, fatigue and fever (Low-grade).   HENT:  Negative for trouble swallowing.    Respiratory:  Negative for cough.    Cardiovascular:  Negative for chest pain.   Gastrointestinal:  Negative for diarrhea, nausea and vomiting.   Genitourinary:  Negative for hematuria.   Musculoskeletal:  Positive for arthralgias and myalgias.   Skin:  Positive for wound.   Neurological:  Positive for weakness.   Psychiatric/Behavioral:  Negative for confusion.      Objective:     Vital Signs (Most Recent):  Temp: 98.6 °F (37 °C) (06/30/24 1204)  Pulse: (!) 54 (06/30/24 1204)  Resp: 20 (06/30/24 1204)  BP: (!) 145/67 (06/30/24 1204)  SpO2: 98 % (06/30/24 1204) Vital Signs (24h Range):  Temp:  [97.9 °F (36.6 °C)-98.7 °F (37.1 °C)] 98.6 °F (37 °C)  Pulse:  [] 54  Resp:  [14-22] 20  SpO2:  [97 %-100 %] 98 %  BP: (117-155)/(58-73) 145/67     Weight: 91.6 kg (201 lb 15.1 oz)  Body mass index is 29.82 kg/m².     Physical Exam  Vitals and nursing note reviewed.   Constitutional:       Appearance: He is obese. He is ill-appearing.   HENT:      Head: Normocephalic and atraumatic.      Mouth/Throat:      Mouth: Mucous membranes are moist.   Eyes:      Extraocular Movements: Extraocular movements intact.   Cardiovascular:      Rate and Rhythm: Normal rate.      Pulses: Normal pulses.      Heart sounds: Normal heart sounds.   Pulmonary:      Effort: Pulmonary effort is normal.      Breath sounds: Normal breath sounds.   Abdominal:      General: Bowel sounds are normal. There is no distension.      Palpations: Abdomen is soft.      Tenderness: There is no abdominal tenderness. There is no guarding.   Musculoskeletal:       Cervical back: Normal range of motion and neck supple.      Comments: Left AKA   Skin:     General: Skin is warm and dry.      Capillary Refill: Capillary refill takes 2 to 3 seconds.   Neurological:      Mental Status: He is alert and oriented to person, place, and time. Mental status is at baseline.      Motor: No weakness.   Psychiatric:         Mood and Affect: Mood normal.         Behavior: Behavior normal.                Significant Labs: All pertinent labs within the past 24 hours have been reviewed.    Significant Imaging: I have reviewed all pertinent imaging results/findings within the past 24 hours.

## 2024-06-30 NOTE — PROGRESS NOTES
Pharmacokinetic Assessment Follow Up: IV Vancomycin    Vancomycin serum concentration assessment(s):    The trough level was drawn correctly and can be used to guide therapy at this time. The measurement is above the desired definitive target range of 10 to 15 mcg/mL.    Vancomycin Regimen Plan:    Discontinue the scheduled vancomycin regimen and re-dose when the random level is less than 15 mcg/mL, next level to be drawn at 0100 on 7/1.    Drug levels (last 3 results):  Recent Labs   Lab Result Units 06/30/24  0029   Vancomycin-Trough ug/mL 27.0*       Pharmacy will continue to follow and monitor vancomycin.    Please contact pharmacy at extension [phone] for questions regarding this assessment.    Thank you for the consult,   Dennise Maria       Patient brief summary:  Saji Castañeda is a 74 y.o. male initiated on antimicrobial therapy with IV Vancomycin for treatment of skin & soft tissue infection    The patient's current regimen is 1250 mg q12h    Drug Allergies:   Review of patient's allergies indicates:  No Known Allergies    Actual Body Weight:   91.6 kg    Renal Function:   Estimated Creatinine Clearance: 89.3 mL/min (based on SCr of 0.8 mg/dL).,     Dialysis Method (if applicable):  N/A    CBC (last 72 hours):  Recent Labs   Lab Result Units 06/27/24  0527 06/27/24  0805 06/27/24  1405 06/28/24  0523 06/29/24  0639   WBC K/uL 8.97 9.18 8.42 8.54 11.91   Hemoglobin g/dL 6.2* 6.5@RCBLD* 7.5* 7.7* 7.4*   Hematocrit % 20.3* 21.6@RCBLD* 24.2* 24.8* 23.7*   Platelets K/uL 477* 535* 491* 520* 452*   Gran % % 69.2 70.9 70.7 68.6 88.9*   Lymph % % 18.4 16.3* 17.9* 17.6* 5.5*   Mono % % 7.6 7.3 6.3 7.4 1.7*   Eosinophil % % 4.0 4.2 4.3 5.2 3.4   Basophil % % 0.4 0.5 0.6 0.6 0.2   Differential Method  Automated Automated Automated Automated Automated       Metabolic Panel (last 72 hours):  Recent Labs   Lab Result Units 06/27/24  0527 06/28/24  0523 06/29/24  0639   Sodium mmol/L 142 141 138   Potassium mmol/L 3.5  3.7 3.5   Chloride mmol/L 102 100 100   CO2 mmol/L 31* 30* 30*   Glucose mg/dL 118* 86 204*   BUN mg/dL 10 9 8   Creatinine mg/dL 0.7 0.7 0.8   Magnesium mg/dL 1.7 1.6 1.5*       Vancomycin Administrations:  vancomycin given in the last 96 hours                     vancomycin 1,250 mg in D5W 250 mL IVPB (Vial-Mate) (mg) 1,250 mg New Bag 06/29/24 1421     1,250 mg New Bag  0025    vancomycin (VANCOCIN) 2,250 mg in D5W 500 mL IVPB (mg) 2,250 mg New Bag 06/28/24 1300                    Microbiologic Results:  Microbiology Results (last 7 days)       Procedure Component Value Units Date/Time    Blood culture [[id number]] Collected: 06/28/24 2252    Order Status: Completed Specimen: Blood from Peripheral, Hand, Right Updated: 06/29/24 1715     Blood Culture, Routine No Growth to date    Narrative:      Collection has been rescheduled by [email or web address] at 06/28/2024 12:19 Reason:   Unable to collect .  Collection has been rescheduled by KML at 06/28/2024 16:08 Reason:   Unable to collect notified nurse Ludmila  Collection has been rescheduled by [email or web address] at 06/28/2024 12:19 Reason:   Unable to collect .  Collection has been rescheduled by KML at 06/28/2024 16:08 Reason:   Unable to collect notified nurse Ludmila    Blood culture [[id number]] Collected: 06/28/24 2251    Order Status: Completed Specimen: Blood from Peripheral, Hand, Right Updated: 06/29/24 1715     Blood Culture, Routine No Growth to date    Narrative:      Collection has been rescheduled by [email or web address] at 06/28/2024 12:19 Reason:   Unable to collect .  Collection has been rescheduled by KML at 06/28/2024 16:08 Reason:   Unable to collect notified nurse Ludmila  Collection has been rescheduled by [email or web address] at 06/28/2024 12:19 Reason:   Unable to collect .  Collection has been rescheduled by KML at 06/28/2024 16:08 Reason:   Unable to collect notified nurse Ludmila    Blood culture [[id number]] Collected: 06/26/24 2344    Order Status: Completed Specimen: Blood Updated: 06/29/24 1212     Blood  Culture, Routine No Growth to date      No Growth to date      No Growth to date    Blood culture [[id number]] Collected: 06/26/24 2344    Order Status: Completed Specimen: Blood Updated: 06/29/24 1212     Blood Culture, Routine No Growth to date      No Growth to date      No Growth to date

## 2024-06-30 NOTE — PLAN OF CARE
AAOx4. C/o pain. PRN pain meds given per MAR. Tolerating cardiac diet. IV ABX.  Blood glucose and cardiac monitoring maintained. Bed locked in lowest position, bed alarm set and call bell within reach.

## 2024-06-30 NOTE — ASSESSMENT & PLAN NOTE
Chronic, uncontrolled. Latest blood pressure and vitals reviewed-     Temp:  [97.9 °F (36.6 °C)-98.7 °F (37.1 °C)]   Pulse:  []   Resp:  [14-22]   BP: (117-155)/(58-73)   SpO2:  [97 %-100 %] .   Home meds for hypertension were reviewed and noted below.   Hypertension Medications               furosemide (LASIX) 20 MG tablet Take 1 tablet (20 mg total) by mouth once daily.    NIFEdipine (PROCARDIA-XL) 90 MG (OSM) 24 hr tablet Take 1 tablet (90 mg total) by mouth once daily.    bumetanide (BUMEX) 0.5 MG Tab Take 0.5 mg by mouth daily as needed.    bumetanide (BUMEX) 1 MG tablet Take by mouth. unknown    hydrALAZINE (APRESOLINE) 100 MG tablet Take by mouth. unknown    isosorbide dinitrate (ISORDIL) 10 MG tablet Take 1 tablet (10 mg total) by mouth 3 (three) times daily. Hold until follow up with a provider            While in the hospital, will manage blood pressure as follows; will resume meds as tolerated     Will utilize p.r.n. blood pressure medication only if patient's blood pressure greater than 160/100 and he develops symptoms such as worsening chest pain or shortness of breath.

## 2024-06-30 NOTE — PLAN OF CARE
Problem: Adult Inpatient Plan of Care  Goal: Patient-Specific Goal (Individualized)  Outcome: Progressing     Problem: Adult Inpatient Plan of Care  Goal: Plan of Care Review  Outcome: Progressing     Problem: Adult Inpatient Plan of Care  Goal: Absence of Hospital-Acquired Illness or Injury  Outcome: Progressing     Problem: Adult Inpatient Plan of Care  Goal: Readiness for Transition of Care  Outcome: Progressing     Problem: Diabetes Comorbidity  Goal: Blood Glucose Level Within Targeted Range  Outcome: Progressing     Problem: Sepsis/Septic Shock  Goal: Absence of Infection Signs and Symptoms  Outcome: Progressing     Problem: Sepsis/Septic Shock  Goal: Blood Glucose Level Within Targeted Range  Outcome: Progressing

## 2024-06-30 NOTE — ASSESSMENT & PLAN NOTE
Creatine stable for now. BMP reviewed- noted Estimated Creatinine Clearance: 89.3 mL/min (based on SCr of 0.8 mg/dL). according to latest data. Based on current GFR, CKD stage is stage 2 - GFR 60-89.  Monitor UOP and serial BMP and adjust therapy as needed. Renally dose meds. Avoid nephrotoxic medications and procedures.

## 2024-06-30 NOTE — PROGRESS NOTES
[facility] - [facility] Surg  Podiatry  Progress Note    Patient Name: Saji Castañeda  MRN: [id number]  Admission Date: 6/26/2024  Hospital Length of Stay: 2 days  Attending Physician: Akira Li MD  Primary Care Provider: [facility] -     Subjective:     Interval History: NAEON, NAD, VSS. Patient is afebrile, dressings are clean/dry/ and intact.   New pedal complaints: None  New results: Osteomyelitis of right posterior calcaneus.       Scheduled Meds:   ferrous sulfate  1 tablet Oral Daily    gabapentin  300 mg Oral BID    isosorbide dinitrate  10 mg Oral TID    mupirocin   Nasal BID    pantoprazole  40 mg Intravenous BID    piperacillin-tazobactam (Zosyn) IV (PEDS and ADULTS) (extended infusion is not appropriate)  4.5 g Intravenous Q8H    senna-docusate 8.6-50 mg  1 tablet Oral BID    sodium chloride 0.9%  10 mL Intravenous Q8H    tamsulosin  0.4 mg Oral Daily    vancomycin (VANCOCIN) IV (PEDS and ADULTS)  1,250 mg Intravenous Q12H     Continuous Infusions:  PRN Meds:  Current Facility-Administered Medications:     0.9%  NaCl infusion (for blood administration), , Intravenous, Q24H PRN    0.9%  NaCl infusion (for blood administration), , Intravenous, Q24H PRN    acetaminophen, 650 mg, Oral, Q4H PRN    albuterol-ipratropium, 3 mL, Nebulization, Q4H PRN    aluminum-magnesium hydroxide-simethicone, 30 mL, Oral, QID PRN    dextrose 10%, 12.5 g, Intravenous, PRN    dextrose 10%, 25 g, Intravenous, PRN    diphenhydrAMINE, 25 mg, Oral, Q6H PRN    furosemide (LASIX) injection, 20 mg, Intravenous, PRN    glucagon (human recombinant), 1 mg, Intramuscular, PRN    glucose, 16 g, Oral, PRN    glucose, 24 g, Oral, PRN    HYDROcodone-acetaminophen, 1 tablet, Oral, Q6H PRN    insulin aspart U-100, 0-5 Units, Subcutaneous, QID (AC + HS) PRN    melatonin, 6 mg, Oral, Nightly PRN    morphine, 1 mg, Intravenous, Q6H PRN    ondansetron, 8 mg, Oral, Q8H PRN    ondansetron, 4 mg, Intravenous, Q8H PRN    simethicone, 1 tablet,  Oral, QID PRN    Pharmacy to dose Vancomycin consult, , , Once **AND** vancomycin - pharmacy to dose, , Intravenous, pharmacy to manage frequency    Review of Systems   Constitutional:  Negative for chills and fever.   Respiratory:  Negative for cough and shortness of breath.    Cardiovascular:  Negative for chest pain.   Gastrointestinal:  Negative for constipation, diarrhea, nausea and vomiting.   Skin:  Positive for wound.     Objective:     Vital Signs (Most Recent):  Temp: 98.4 °F (36.9 °C) (06/1950)  Pulse: 72 (06/1950)  Resp: 20 (06/1950)  BP: (!) 144/65 (06/1950)  SpO2: 100 % (06/1950) Vital Signs (24h Range):  Temp:  [98.3 °F (36.8 °C)-99.3 °F (37.4 °C)] 98.4 °F (36.9 °C)  Pulse:  [] 72  Resp:  [16-22] 20  SpO2:  [97 %-100 %] 100 %  BP: ()/(49-65) 144/65     Weight: 87.5 kg (192 lb 14.4 oz)  Body mass index is 28.49 kg/m².    Foot Exam    General  General Appearance: appears stated age and healthy   Orientation: alert and oriented to person, place, and time   Affect: appropriate       Right Foot/Ankle     Inspection and Palpation  Ecchymosis: none  Tenderness: none   Swelling: none   Skin Exam: skin intact;     Neurovascular  Dorsalis pedis: 1+  Posterior tibial: absent  Saphenous nerve sensation: diminished  Tibial nerve sensation: diminished  Superficial peroneal nerve sensation: diminished  Deep peroneal nerve sensation: diminished  Sural nerve sensation: diminished    Comments  No malodor noted.     Left Foot/Ankle      Inspection and Palpation  Ecchymosis: none  Tenderness: none   Swelling: none   Skin Exam: skin intact;     Neurovascular  Dorsalis pedis: 1+  Posterior tibial: absent  Saphenous nerve sensation: diminished  Tibial nerve sensation: diminished  Superficial peroneal nerve sensation: diminished  Deep peroneal nerve sensation: diminished  Sural nerve sensation: diminished          Right heel.   Laboratory:  A1C:   Recent Labs   Lab 03/05/24  0900  "06/26/24  1200   HGBA1C >14.0* 6.8*     CBC:   Recent Labs   Lab 06/29/24  0639   WBC 11.91   RBC 2.92*   HGB 7.4*   HCT 23.7*   *   MCV 81*   MCH 25.3*   MCHC 31.2*     CMP:   Recent Labs   Lab 06/26/24  1200 06/27/24  0527 06/29/24  0639   *   < > 204*   CALCIUM 8.8   < > 7.9*   ALBUMIN 1.6*  --   --    PROT 6.7  --   --       < > 138   K 3.0*   < > 3.5   CO2 33*   < > 30*   CL 98   < > 100   BUN 13   < > 8   CREATININE 0.9   < > 0.8   ALKPHOS 79  --   --    ALT 17  --   --    AST 26  --   --    BILITOT 0.2  --   --     < > = values in this interval not displayed.     CRP: No results for input(s): "CRP" in the last 168 hours.  ESR: No results for input(s): "SEDRATE" in the last 168 hours.  Wound Cultures:   Recent Labs   Lab 03/06/24  1439   LABAERO PROTEUS MIRABILIS  Few  *  ENTEROBACTER CLOACAE  Few  Skin bossman also present  *     Microbiology Results (last 7 days)       Procedure Component Value Units Date/Time    Blood culture [[id number]] Collected: 06/28/24 2252    Order Status: Completed Specimen: Blood from Peripheral, Hand, Right Updated: 06/29/24 1715     Blood Culture, Routine No Growth to date    Narrative:      Collection has been rescheduled by [email or web address] at 06/28/2024 12:19 Reason:   Unable to collect .  Collection has been rescheduled by KML at 06/28/2024 16:08 Reason:   Unable to collect notified nurse Ludmila  Collection has been rescheduled by [email or web address] at 06/28/2024 12:19 Reason:   Unable to collect .  Collection has been rescheduled by KML at 06/28/2024 16:08 Reason:   Unable to collect notified nurse Ludmila    Blood culture [[id number]] Collected: 06/28/24 2251    Order Status: Completed Specimen: Blood from Peripheral, Hand, Right Updated: 06/29/24 1715     Blood Culture, Routine No Growth to date    Narrative:      Collection has been rescheduled by [email or web address] at 06/28/2024 12:19 Reason:   Unable to collect .  Collection has been rescheduled by KML at 06/28/2024 16:08 Reason:   Unable to " collect notified nurse Keys  Collection has been rescheduled by BRENDA at 06/28/2024 12:19 Reason:   Unable to collect .  Collection has been rescheduled by KML at 06/28/2024 16:08 Reason:   Unable to collect notified nurse Ludmila    Blood culture [[id number]] Collected: 06/26/24 2344    Order Status: Completed Specimen: Blood Updated: 06/29/24 1212     Blood Culture, Routine No Growth to date      No Growth to date      No Growth to date    Blood culture [[id number]] Collected: 06/26/24 2344    Order Status: Completed Specimen: Blood Updated: 06/29/24 1212     Blood Culture, Routine No Growth to date      No Growth to date      No Growth to date          Specimen (24h ago, onward)      None            Diagnostic Results:  I have reviewed all pertinent imaging results/findings within the past 24 hours.      Assessment/Plan:     ID  Osteomyelitis of right lower extremity  Saji Castañeda is a 74 y.o. male with s/p left AKA, and now right heel wound complicated by osteomyelitis.     - Dicussed at length conservative vs surgical treatment of OM. Answered patient questions. At this time patient amenable to conservative treatment only. Does not wish for any amputation or debridement. Explained biomechanical potential consequences and patient states he does not want to do anything to lose his right leg.   - Abx per primary  - Pain mgmt per primary  - Please reach out to podiatry for any questions or concerns.         Brooks Trujillo DPM  Podiatry  [facility] Surg

## 2024-06-30 NOTE — ASSESSMENT & PLAN NOTE
Patient with trial fibrillation which is uncontrolled currently with Calcium Channel Blocker. Patient is currently in atrial fibrillation.[id number] Score: 4. . Anticoagulation on hold 2/2 GI bleed.

## 2024-07-01 ENCOUNTER — TELEPHONE (OUTPATIENT)
Dept: GASTROENTEROLOGY | Facility: HOSPITAL | Age: 74
End: 2024-07-01
Payer: MEDICARE

## 2024-07-01 LAB
ANION GAP SERPL CALC-SCNC: 6 MMOL/L (ref 8–16)
BASOPHILS # BLD AUTO: 0.03 K/UL (ref 0–0.2)
BASOPHILS NFR BLD: 0.2 % (ref 0–1.9)
BUN SERPL-MCNC: 7 MG/DL (ref 8–23)
CALCIUM SERPL-MCNC: 8 MG/DL (ref 8.7–10.5)
CHLORIDE SERPL-SCNC: 100 MMOL/L (ref 95–110)
CO2 SERPL-SCNC: 31 MMOL/L (ref 23–29)
CREAT SERPL-MCNC: 0.9 MG/DL (ref 0.5–1.4)
DIFFERENTIAL METHOD BLD: ABNORMAL
EOSINOPHIL # BLD AUTO: 0.8 K/UL (ref 0–0.5)
EOSINOPHIL NFR BLD: 6.3 % (ref 0–8)
ERYTHROCYTE [DISTWIDTH] IN BLOOD BY AUTOMATED COUNT: 18.4 % (ref 11.5–14.5)
EST. GFR  (NO RACE VARIABLE): >60 ML/MIN/1.73 M^2
GLUCOSE SERPL-MCNC: 139 MG/DL (ref 70–110)
GRAM STN SPEC: NORMAL
HCT VFR BLD AUTO: 23.6 % (ref 40–54)
HCT VFR BLD AUTO: 24.7 % (ref 40–54)
HGB BLD-MCNC: 7.1 G/DL (ref 14–18)
HGB BLD-MCNC: 7.3 G/DL (ref 14–18)
IMM GRANULOCYTES # BLD AUTO: 0.05 K/UL (ref 0–0.04)
IMM GRANULOCYTES NFR BLD AUTO: 0.4 % (ref 0–0.5)
LYMPHOCYTES # BLD AUTO: 1.2 K/UL (ref 1–4.8)
LYMPHOCYTES NFR BLD: 9.3 % (ref 18–48)
MCH RBC QN AUTO: 25 PG (ref 27–31)
MCHC RBC AUTO-ENTMCNC: 30.1 G/DL (ref 32–36)
MCV RBC AUTO: 83 FL (ref 82–98)
MONOCYTES # BLD AUTO: 0.6 K/UL (ref 0.3–1)
MONOCYTES NFR BLD: 4.5 % (ref 4–15)
NEUTROPHILS # BLD AUTO: 10.3 K/UL (ref 1.8–7.7)
NEUTROPHILS NFR BLD: 79.3 % (ref 38–73)
NRBC BLD-RTO: 0 /100 WBC
PLATELET # BLD AUTO: 445 K/UL (ref 150–450)
PMV BLD AUTO: 8.6 FL (ref 9.2–12.9)
POCT GLUCOSE: 149 MG/DL (ref 70–110)
POCT GLUCOSE: 161 MG/DL (ref 70–110)
POCT GLUCOSE: 224 MG/DL (ref 70–110)
POCT GLUCOSE: 235 MG/DL (ref 70–110)
POTASSIUM SERPL-SCNC: 3.4 MMOL/L (ref 3.5–5.1)
RBC # BLD AUTO: 2.84 M/UL (ref 4.6–6.2)
SODIUM SERPL-SCNC: 137 MMOL/L (ref 136–145)
VANCOMYCIN SERPL-MCNC: 12.4 UG/ML
VANCOMYCIN SERPL-MCNC: 15.9 UG/ML
WBC # BLD AUTO: 12.96 K/UL (ref 3.9–12.7)

## 2024-07-01 PROCEDURE — 88307 TISSUE EXAM BY PATHOLOGIST: CPT | Performed by: PATHOLOGY

## 2024-07-01 PROCEDURE — 63600175 PHARM REV CODE 636 W HCPCS: Performed by: STUDENT IN AN ORGANIZED HEALTH CARE EDUCATION/TRAINING PROGRAM

## 2024-07-01 PROCEDURE — 80202 ASSAY OF VANCOMYCIN: CPT | Performed by: STUDENT IN AN ORGANIZED HEALTH CARE EDUCATION/TRAINING PROGRAM

## 2024-07-01 PROCEDURE — 36415 COLL VENOUS BLD VENIPUNCTURE: CPT | Mod: XB | Performed by: NURSE PRACTITIONER

## 2024-07-01 PROCEDURE — 25000003 PHARM REV CODE 250: Performed by: NURSE PRACTITIONER

## 2024-07-01 PROCEDURE — 87116 MYCOBACTERIA CULTURE: CPT

## 2024-07-01 PROCEDURE — 87205 SMEAR GRAM STAIN: CPT

## 2024-07-01 PROCEDURE — 99232 SBSQ HOSP IP/OBS MODERATE 35: CPT | Mod: GC,,, | Performed by: INTERNAL MEDICINE

## 2024-07-01 PROCEDURE — 88311 DECALCIFY TISSUE: CPT | Performed by: PATHOLOGY

## 2024-07-01 PROCEDURE — 87070 CULTURE OTHR SPECIMN AEROBIC: CPT

## 2024-07-01 PROCEDURE — 87077 CULTURE AEROBIC IDENTIFY: CPT

## 2024-07-01 PROCEDURE — 36415 COLL VENOUS BLD VENIPUNCTURE: CPT | Mod: XB | Performed by: STUDENT IN AN ORGANIZED HEALTH CARE EDUCATION/TRAINING PROGRAM

## 2024-07-01 PROCEDURE — 25000003 PHARM REV CODE 250: Performed by: STUDENT IN AN ORGANIZED HEALTH CARE EDUCATION/TRAINING PROGRAM

## 2024-07-01 PROCEDURE — 99232 SBSQ HOSP IP/OBS MODERATE 35: CPT | Mod: ,,, | Performed by: INTERNAL MEDICINE

## 2024-07-01 PROCEDURE — 63600175 PHARM REV CODE 636 W HCPCS: Performed by: NURSE PRACTITIONER

## 2024-07-01 PROCEDURE — 21400001 HC TELEMETRY ROOM

## 2024-07-01 PROCEDURE — 87150 DNA/RNA AMPLIFIED PROBE: CPT | Mod: 59

## 2024-07-01 PROCEDURE — A4216 STERILE WATER/SALINE, 10 ML: HCPCS | Performed by: NURSE PRACTITIONER

## 2024-07-01 PROCEDURE — 99900035 HC TECH TIME PER 15 MIN (STAT)

## 2024-07-01 PROCEDURE — 85025 COMPLETE CBC W/AUTO DIFF WBC: CPT | Performed by: NURSE PRACTITIONER

## 2024-07-01 PROCEDURE — 87186 SC STD MICRODIL/AGAR DIL: CPT

## 2024-07-01 PROCEDURE — 87075 CULTR BACTERIA EXCEPT BLOOD: CPT

## 2024-07-01 PROCEDURE — 94761 N-INVAS EAR/PLS OXIMETRY MLT: CPT

## 2024-07-01 PROCEDURE — 0QBL3ZX EXCISION OF RIGHT TARSAL, PERCUTANEOUS APPROACH, DIAGNOSTIC: ICD-10-PCS | Performed by: STUDENT IN AN ORGANIZED HEALTH CARE EDUCATION/TRAINING PROGRAM

## 2024-07-01 PROCEDURE — 85014 HEMATOCRIT: CPT | Performed by: NURSE PRACTITIONER

## 2024-07-01 PROCEDURE — 87206 SMEAR FLUORESCENT/ACID STAI: CPT

## 2024-07-01 PROCEDURE — 80048 BASIC METABOLIC PNL TOTAL CA: CPT | Performed by: NURSE PRACTITIONER

## 2024-07-01 PROCEDURE — 80202 ASSAY OF VANCOMYCIN: CPT | Mod: 91 | Performed by: STUDENT IN AN ORGANIZED HEALTH CARE EDUCATION/TRAINING PROGRAM

## 2024-07-01 PROCEDURE — 85018 HEMOGLOBIN: CPT | Performed by: NURSE PRACTITIONER

## 2024-07-01 PROCEDURE — 87102 FUNGUS ISOLATION CULTURE: CPT

## 2024-07-01 RX ORDER — LIDOCAINE HYDROCHLORIDE 10 MG/ML
1 INJECTION, SOLUTION EPIDURAL; INFILTRATION; INTRACAUDAL; PERINEURAL ONCE
Status: DISCONTINUED | OUTPATIENT
Start: 2024-07-01 | End: 2024-07-11 | Stop reason: HOSPADM

## 2024-07-01 RX ORDER — METRONIDAZOLE 500 MG/1
500 TABLET ORAL EVERY 8 HOURS
Status: DISCONTINUED | OUTPATIENT
Start: 2024-07-01 | End: 2024-07-05

## 2024-07-01 RX ADMIN — ISOSORBIDE DINITRATE 10 MG: 10 TABLET ORAL at 10:07

## 2024-07-01 RX ADMIN — MUPIROCIN: 20 OINTMENT TOPICAL at 09:07

## 2024-07-01 RX ADMIN — ISOSORBIDE DINITRATE 10 MG: 10 TABLET ORAL at 02:07

## 2024-07-01 RX ADMIN — VANCOMYCIN HYDROCHLORIDE 1250 MG: 1.25 INJECTION, POWDER, LYOPHILIZED, FOR SOLUTION INTRAVENOUS at 03:07

## 2024-07-01 RX ADMIN — DOCUSATE SODIUM AND SENNOSIDES 1 TABLET: 8.6; 5 TABLET, FILM COATED ORAL at 09:07

## 2024-07-01 RX ADMIN — CEFEPIME 2 G: 2 INJECTION, POWDER, FOR SOLUTION INTRAVENOUS at 10:07

## 2024-07-01 RX ADMIN — METRONIDAZOLE 500 MG: 500 TABLET ORAL at 09:07

## 2024-07-01 RX ADMIN — MORPHINE SULFATE 1 MG: 2 INJECTION, SOLUTION INTRAMUSCULAR; INTRAVENOUS at 02:07

## 2024-07-01 RX ADMIN — PANTOPRAZOLE SODIUM 40 MG: 40 INJECTION, POWDER, LYOPHILIZED, FOR SOLUTION INTRAVENOUS at 10:07

## 2024-07-01 RX ADMIN — TAMSULOSIN HYDROCHLORIDE 0.4 MG: 0.4 CAPSULE ORAL at 10:07

## 2024-07-01 RX ADMIN — Medication 10 ML: at 01:07

## 2024-07-01 RX ADMIN — POTASSIUM BICARBONATE 25 MEQ: 978 TABLET, EFFERVESCENT ORAL at 10:07

## 2024-07-01 RX ADMIN — GABAPENTIN 300 MG: 300 CAPSULE ORAL at 09:07

## 2024-07-01 RX ADMIN — PANTOPRAZOLE SODIUM 40 MG: 40 INJECTION, POWDER, LYOPHILIZED, FOR SOLUTION INTRAVENOUS at 09:07

## 2024-07-01 RX ADMIN — APIXABAN 5 MG: 5 TABLET, FILM COATED ORAL at 11:07

## 2024-07-01 RX ADMIN — GABAPENTIN 300 MG: 300 CAPSULE ORAL at 10:07

## 2024-07-01 RX ADMIN — PIPERACILLIN SODIUM AND TAZOBACTAM SODIUM 4.5 G: 4; .5 INJECTION, POWDER, LYOPHILIZED, FOR SOLUTION INTRAVENOUS at 12:07

## 2024-07-01 RX ADMIN — CEFEPIME 2 G: 2 INJECTION, POWDER, FOR SOLUTION INTRAVENOUS at 03:07

## 2024-07-01 RX ADMIN — Medication 10 ML: at 09:07

## 2024-07-01 RX ADMIN — ISOSORBIDE DINITRATE 10 MG: 10 TABLET ORAL at 09:07

## 2024-07-01 RX ADMIN — MUPIROCIN: 20 OINTMENT TOPICAL at 10:07

## 2024-07-01 RX ADMIN — PIPERACILLIN SODIUM AND TAZOBACTAM SODIUM 4.5 G: 4; .5 INJECTION, POWDER, LYOPHILIZED, FOR SOLUTION INTRAVENOUS at 10:07

## 2024-07-01 RX ADMIN — FERROUS SULFATE TAB 325 MG (65 MG ELEMENTAL FE) 1 EACH: 325 (65 FE) TAB at 10:07

## 2024-07-01 RX ADMIN — INSULIN ASPART 2 UNITS: 100 INJECTION, SOLUTION INTRAVENOUS; SUBCUTANEOUS at 09:07

## 2024-07-01 NOTE — HOSPITAL COURSE
He was admitted to the hospital medicine service for further care.  Patient noted with anemia as well as osteomyelitis on admission.  Patient underwent a EGD on 06/28 which noted Erosive gastropathy.  Anticoagulation is on hold until cleared by GI.  He did receive 1 unit of packed red blood cells on admission.  CT of his right foot noted with osteomyelitis.  Appreciate Podiatry is evaluation---status post bone biopsy on today---also appreciate infectious disease.

## 2024-07-01 NOTE — ASSESSMENT & PLAN NOTE
Creatine stable for now. BMP reviewed- noted Estimated Creatinine Clearance: 79.3 mL/min (based on SCr of 0.9 mg/dL). according to latest data. Based on current GFR, CKD stage is stage 2 - GFR 60-89.  Monitor UOP and serial BMP and adjust therapy as needed. Renally dose meds. Avoid nephrotoxic medications and procedures.

## 2024-07-01 NOTE — PROGRESS NOTES
[facility] Medicine  Progress Note    Patient Name: Saji Castañeda  MRN: [id number]  Patient Class: IP- Inpatient   Admission Date: 6/26/2024  Length of Stay: 4 days  Attending Physician: Akira Li MD  Primary Care Provider: [facility] -        Subjective:     Principal Problem:Anemia        HPI:  Saji Castañeda is a 75 y/o M who has a pmh of Arthritis, Bacteremia due to Gram-negative bacteria, Diabetes mellitus, Diabetes mellitus, type 2, Hyperlipidemia, Hypertension, Osteomyelitis, and Palliative care encounter. presenting to the Emergency Department for referral. Patient was sent to the ED via EMS from [facility] for low blood count. Patient denies any chest pain, shortness on breath, weakness, fatigue, dizziness. His hemoglobin was 6.3--will order 1 u PRBC ordered. FOBT pending. Will admit to the [facility] service for further care.    Overview/Hospital Course:  He was admitted to the hospital medicine service for further care.  Patient noted with anemia as well as osteomyelitis on admission.  Patient underwent a EGD on 06/28 which noted Erosive gastropathy.  Anticoagulation is on hold until cleared by GI.  He did receive 1 unit of packed red blood cells on admission.  CT of his right foot noted with osteomyelitis.  Appreciate Podiatry is evaluation---status post bone biopsy on today---also appreciate infectious disease.    Interval History:  Patient seen on a.m. rounds.  No distress noted.  Appreciate consultants on this case.  Continue antibiotics for osteomyelitis treatment.  Following cultures.  He is status post EGD.  Doing well. Appreciate ID's eval and recs.    Review of Systems   Constitutional:  Positive for activity change, appetite change, fatigue and fever (Low-grade).   HENT:  Negative for trouble swallowing.    Respiratory:  Negative for cough.    Cardiovascular:  Negative for chest pain.   Gastrointestinal:  Negative for diarrhea, nausea and vomiting.    Genitourinary:  Negative for hematuria.   Musculoskeletal:  Positive for arthralgias and myalgias.   Skin:  Positive for wound.   Neurological:  Positive for weakness.   Psychiatric/Behavioral:  Negative for confusion.      Objective:     Vital Signs (Most Recent):  Temp: 98.4 °F (36.9 °C) (07/01/24 1130)  Pulse: 60 (07/01/24 1220)  Resp: 18 (07/01/24 1130)  BP: (!) 139/97 (07/01/24 1130)  SpO2: 100 % (07/01/24 1130) Vital Signs (24h Range):  Temp:  [98.4 °F (36.9 °C)-99 °F (37.2 °C)] 98.4 °F (36.9 °C)  Pulse:  [43-78] 60  Resp:  [16-20] 18  SpO2:  [95 %-100 %] 100 %  BP: (118-153)/(58-97) 139/97     Weight: 91.6 kg (201 lb 15.1 oz)  Body mass index is 29.82 kg/m².     Physical Exam  Vitals and nursing note reviewed.   Constitutional:       Appearance: He is obese. He is ill-appearing.   HENT:      Head: Normocephalic and atraumatic.      Mouth/Throat:      Mouth: Mucous membranes are moist.   Eyes:      Extraocular Movements: Extraocular movements intact.   Cardiovascular:      Rate and Rhythm: Normal rate.      Pulses: Normal pulses.      Heart sounds: Normal heart sounds.   Pulmonary:      Effort: Pulmonary effort is normal.      Breath sounds: Normal breath sounds.   Abdominal:      General: Bowel sounds are normal. There is no distension.      Palpations: Abdomen is soft.      Tenderness: There is no abdominal tenderness. There is no guarding.   Musculoskeletal:      Cervical back: Normal range of motion and neck supple.      Comments: Left AKA   Skin:     General: Skin is warm and dry.      Capillary Refill: Capillary refill takes 2 to 3 seconds.   Neurological:      Mental Status: He is alert and oriented to person, place, and time. Mental status is at baseline.      Motor: No weakness.   Psychiatric:         Mood and Affect: Mood normal.         Behavior: Behavior normal.                Significant Labs: All pertinent labs within the past 24 hours have been reviewed.    Significant Imaging: I have reviewed  all pertinent imaging results/findings within the past 24 hours.    Assessment/Plan:      * Anemia  Patient's anemia is currently uncontrolled. Has 1 u PRBC ordered. Etiology likely d/t Iron deficiency  Current CBC reviewed-   Lab Results   Component Value Date    HGB 7.1 (L) 07/01/2024    HCT 23.6 (L) 07/01/2024     Monitor serial CBC and transfuse if patient becomes hemodynamically unstable, symptomatic or H/H drops below 7/21.  -attempted blood transfusion yesterday however patient developed chest transfusion was stopped.  Will order 1 pack of packed red blood cells this morning and premedicate prior.  Orders for p.r.n. Lasix to be given in between units.  --consulted GI.  --s/p endoscopy on 6/28:  Impression:            - Normal esophagus.                          - Erosive gastropathy with no stigmata of recent                          bleeding. Biopsied.                          - Normal examined duodenum.   Recommendation:        - Return patient to hospital [facility] for ongoing care.                          - Resume previous diet.                          - Continue present medications.                          - Await pathology results.                          - Observe patient's clinical course.                          - Perform a colonoscopy at appointment to be                          scheduled.     Pressure injury of ankle, unstageable-lateral  Pressure injury of buttock, stage 4   Pressure injury of right heel, stage 4     - Appreciate IP wound care  - Pressure injury prevention interventions - waffle overlay and heel boot  - Vashe wet-to-dry to bilateral buttocks, R buttock, and R heel wound BID  - Mepilex border to L posterior thigh/gluteal fold 3x/week  - Podiatry consulted for R heel wound   --also noted with a bilateral buttock full thickness pressure injury  --Right heel and sacrum CT ordered to r/o OM  --appreciate cardiology's evaluation      Wounds, multiple  -patient with wounds to his sacrum  and heel  -wound care consulted  -Ulcer care ordered      Chronic anticoagulation  Holding OAC 2/2 GI bleed      Stage 3b chronic kidney disease  Creatine stable for now. BMP reviewed- noted Estimated Creatinine Clearance: 79.3 mL/min (based on SCr of 0.9 mg/dL). according to latest data. Based on current GFR, CKD stage is stage 2 - GFR 60-89.  Monitor UOP and serial BMP and adjust therapy as needed. Renally dose meds. Avoid nephrotoxic medications and procedures.    Paroxysmal atrial fibrillation  Patient with trial fibrillation which is uncontrolled currently with Calcium Channel Blocker. Patient is currently in atrial fibrillation.[id number] Score: 4. . Anticoagulation on hold 2/2 GI bleed.    Osteomyelitis of right lower extremity  - Consulted ID  - IV abx ordered---vancomycin and zosyn  - Blood cultures from 6/26 with no growth to date  -Appreciate IDs plan below:  -if he is refusing debridement or bone biopsy would treat empirically with vanco, cefepime, and po flagyl   -will need 6 weeks of therapy  -while outpatient will need weekly cbc, cmp, crp ,and vanco trough faxed to [phone] (or can be monitored at nursing [facility]        Chronic deep vein thrombosis (DVT) of right upper extremity  -holding anticoagulation in the setting of GI bleed  -follow      Peripheral arterial disease  PVD (peripheral vascular disease)    -holding ASA and anticoagulation at this time. Will follow       Type 2 diabetes mellitus, with long-term current use of insulin    Patient's FSGs are uncontrolled due to hyperglycemia on current medication regimen.  Last A1c reviewed-   Lab Results   Component Value Date    HGBA1C 6.8 (H) 06/26/2024     Most recent fingerstick glucose reviewed-   Recent Labs   Lab 06/30/24  1624 06/30/24  2117 07/01/24  0603 07/01/24  1130   POCTGLUCOSE 176* 187* 149* 161*       Current correctional scale  Low  Maintain anti-hyperglycemic dose as follows-   Antihyperglycemics (From admission, onward)      Start      Stop Route Frequency Ordered    06/26/24 1552  insulin aspart U-100 pen 0-5 Units         -- SubQ Before meals & nightly PRN 06/26/24 1453          Hold Oral hypoglycemics while patient is in the hospital.      Essential hypertension  Chronic, uncontrolled. Latest blood pressure and vitals reviewed-     Temp:  [98.4 °F (36.9 °C)-99 °F (37.2 °C)]   Pulse:  [43-78]   Resp:  [16-20]   BP: (118-153)/(58-97)   SpO2:  [95 %-100 %] .   Home meds for hypertension were reviewed and noted below.   Hypertension Medications               furosemide (LASIX) 20 MG tablet Take 1 tablet (20 mg total) by mouth once daily.    NIFEdipine (PROCARDIA-XL) 90 MG (OSM) 24 hr tablet Take 1 tablet (90 mg total) by mouth once daily.    bumetanide (BUMEX) 0.5 MG Tab Take 0.5 mg by mouth daily as needed.    bumetanide (BUMEX) 1 MG tablet Take by mouth. unknown    hydrALAZINE (APRESOLINE) 100 MG tablet Take by mouth. unknown    isosorbide dinitrate (ISORDIL) 10 MG tablet Take 1 tablet (10 mg total) by mouth 3 (three) times daily. Hold until follow up with a provider            While in the hospital, will manage blood pressure as follows; will resume meds as tolerated     Will utilize p.r.n. blood pressure medication only if patient's blood pressure greater than 160/100 and he develops symptoms such as worsening chest pain or shortness of breath.      VTE Risk Mitigation (From admission, onward)           Ordered     Reason for No Pharmacological VTE Prophylaxis  Once        Question:  Reasons:  Answer:  Risk of Bleeding    06/26/24 1453     IP VTE HIGH RISK PATIENT  Once         06/26/24 1453     Place sequential compression device  Until discontinued         06/26/24 1453                    Discharge Planning   OXANA:      Code Status: Full Code   Is the patient medically ready for discharge?:     Reason for patient still in hospital (select all that apply): Laboratory test, Treatment, Imaging, Consult recommendations, and PT / OT  recommendations  Discharge Plan A: Return to nursing home                  Lo Melgar NP  Department of Hospital Medicine   [facility] Surg

## 2024-07-01 NOTE — PLAN OF CARE
Problem: Adult Inpatient Plan of Care  Goal: Plan of Care Review  Outcome: Progressing     Problem: Adult Inpatient Plan of Care  Goal: Patient-Specific Goal (Individualized)  Outcome: Progressing     Problem: Adult Inpatient Plan of Care  Goal: Absence of Hospital-Acquired Illness or Injury  Outcome: Progressing     Problem: Adult Inpatient Plan of Care  Goal: Optimal Comfort and Wellbeing  Outcome: Progressing

## 2024-07-01 NOTE — PLAN OF CARE
Plan of care reviewed with patient. Patient verbalized understanding but needs reinforcement. Instructed to use call light for assistance Call light in reach. Wound care performed. Patient was turned throughout shift.

## 2024-07-01 NOTE — PROGRESS NOTES
[facility] Surg  Infectious Disease  Progress Note    Patient Name: Saji Castañeda  MRN: [id number]  Admission Date: 6/26/2024  Length of Stay: 4 days  Attending Physician: Akira Li MD  [facility] Care Provider: [facility] -    Isolation Status: No active isolations  Assessment/Plan:      ID  Osteomyelitis of right lower extremity  73 y/o M who has a pmh of Arthritis, Bacteremia due to Gram-negative bacteria, Diabetes mellitus, Diabetes mellitus, type 2, Hyperlipidemia, Hypertension, and left AKA who is admitted and found to have right heel osteo    -if he is refusing debridement or bone biopsy would treat empirically with vanco, cefepime, and po flagyl   -will need 6 weeks of therapy  -while outpatient will need weekly cbc, cmp, crp ,and vanco trough faxed to [phone] (or can be monitored at nursing home)          Thank you for your consult. I will follow-up with patient. Please contact us if you have any additional questions.    Jaylan Arita MD  Infectious Disease  [place] - Med Surg    Subjective:     Principal Problem:Anemia    HPI: 73 y/o M who has a pmh of Arthritis, Bacteremia due to Gram-negative bacteria, Diabetes mellitus, Diabetes mellitus, type 2, Hyperlipidemia, Hypertension, Osteomyelitis, and Palliative care encounter presenting to the Emergency Department for referral. Patient was sent to the ED via EMS [facility] for low blood count. Patient denies any chest pain, shortness on breath, weakness, fatigue, dizziness. ID is consulted for OM of right heel found on CT. On vanco and zosyn. He does not want amputation. Seen by cards and is not a revascularization candidate.   Interval History: No acute events    Review of Systems   Constitutional:  Positive for activity change, appetite change, fatigue and fever (Low-grade).   HENT:  Negative for trouble swallowing.    Respiratory:  Negative for cough.    Cardiovascular:  Negative for chest pain.   Gastrointestinal:   Negative for diarrhea, nausea and vomiting.   Genitourinary:  Negative for hematuria.   Musculoskeletal:  Positive for arthralgias and myalgias.   Skin:  Positive for wound.   Neurological:  Positive for weakness.   Psychiatric/Behavioral:  Negative for confusion.      Objective:     Vital Signs (Most Recent):  Temp: 98.4 °F (36.9 °C) (07/01/24 1130)  Pulse: 70 (07/01/24 1130)  Resp: 18 (07/01/24 1130)  BP: (!) 139/97 (07/01/24 1130)  SpO2: 100 % (07/01/24 1130) Vital Signs (24h Range):  Temp:  [98.4 °F (36.9 °C)-99 °F (37.2 °C)] 98.4 °F (36.9 °C)  Pulse:  [43-78] 70  Resp:  [16-20] 18  SpO2:  [95 %-100 %] 100 %  BP: (118-153)/(58-97) 139/97     Weight: 91.6 kg (201 lb 15.1 oz)  Body mass index is 29.82 kg/m².    Estimated Creatinine Clearance: 79.3 mL/min (based on SCr of 0.9 mg/dL).     Physical Exam  Vitals and nursing note reviewed.   Constitutional:       Appearance: He is obese.   HENT:      Head: Normocephalic and atraumatic.      Mouth/Throat:      Mouth: Mucous membranes are moist.   Eyes:      Extraocular Movements: Extraocular movements intact.   Cardiovascular:      Rate and Rhythm: Normal rate.      Pulses: Normal pulses.      Heart sounds: Normal heart sounds.   Pulmonary:      Effort: Pulmonary effort is normal.      Breath sounds: Normal breath sounds.   Abdominal:      General: Bowel sounds are normal. There is no distension.      Palpations: Abdomen is soft.      Tenderness: There is no abdominal tenderness. There is no guarding.   Musculoskeletal:      Cervical back: Normal range of motion and neck supple.      Comments: Left AKA   Skin:     General: Skin is warm and dry.      Capillary Refill: Capillary refill takes 2 to 3 seconds.   Neurological:      Mental Status: He is alert and oriented to person, place, and time. Mental status is at baseline.      Motor: No weakness.   Psychiatric:         Mood and Affect: Mood normal.         Behavior: Behavior normal.          Significant Labs: All  pertinent labs within the past 24 hours have been reviewed.    Significant Imaging: I have reviewed all pertinent imaging results/findings within the past 24 hours.

## 2024-07-01 NOTE — PROGRESS NOTES
Pharmacist Renal Dose Adjustment Note    Saji Castañeda is a 74 y.o. male being treated with the medication cefepime    Patient Data:    Vital Signs (Most Recent):  Temp: 98.4 °F (36.9 °C) (07/01/24 1130)  Pulse: 60 (07/01/24 1220)  Resp: 18 (07/01/24 1412)  BP: (!) 139/97 (07/01/24 1130)  SpO2: 100 % (07/01/24 1130) Vital Signs (72h Range):  Temp:  [97.9 °F (36.6 °C)-99.3 °F (37.4 °C)]   Pulse:  []   Resp:  [14-22]   BP: ()/(49-97)   SpO2:  [95 %-100 %]      Recent Labs   Lab 06/29/24  0639 06/30/24  1257 07/01/24  0501   CREATININE 0.8 0.8 0.9     Serum creatinine: 0.9 mg/dL 07/01/24 0501  Estimated creatinine clearance: 79.3 mL/min    Medication:Cefepime dose: 1g frequency q 24 hrs will be changed to medication:cefepime dose:2g frequency:q 8 hrs    Pharmacist's Name: Va Phelan  Pharmacist's Extension: [phone]

## 2024-07-01 NOTE — ASSESSMENT & PLAN NOTE
73 y/o M who has a pmh of Arthritis, Bacteremia due to Gram-negative bacteria, Diabetes mellitus, Diabetes mellitus, type 2, Hyperlipidemia, Hypertension, and left AKA who is admitted and found to have right heel osteo    -if he is refusing debridement or bone biopsy would treat empirically with vanco, cefepime, and po flagyl   -will need 6 weeks of therapy  -while outpatient will need weekly cbc, cmp, crp ,and vanco trough faxed to [phone] (or can be monitored at nursing [place])

## 2024-07-01 NOTE — PROGRESS NOTES
Pharmacokinetic Assessment Follow Up: IV Vancomycin    Vancomycin serum concentration assessment(s):    The random level was drawn correctly and can be used to guide therapy at this time. The measurement is below the desired definitive target range of 15 to 20 mcg/mL.    Vancomycin Regimen Plan:    Will continue with vancomycin 1250 mg IV q 24 hours and recheck vacnomycin trough on 7/3 @ 1400     Drug levels (last 3 results):  Recent Labs   Lab Result Units 06/30/24  0029 07/01/24  0042 07/01/24  1241   Vancomycin, Random ug/mL  --  15.9 12.4   Vancomycin-Trough ug/mL 27.0*  --   --        Pharmacy will continue to follow and monitor vancomycin.    Please contact pharmacy at extension [phone] for questions regarding this assessment.    Thank you for the consult,   Va Phelan       Patient brief summary:  Saji Castañeda is a 74 y.o. male initiated on antimicrobial therapy with IV Vancomycin for treatment of bone/joint infection    The patient's current regimen is pulse dose    Drug Allergies:   Review of patient's allergies indicates:  No Known Allergies    Actual Body Weight:   91.6 kg    Renal Function:   Estimated Creatinine Clearance: 79.3 mL/min (based on SCr of 0.9 mg/dL).,     Dialysis Method (if applicable):  N/A    CBC (last 72 hours):  Recent Labs   Lab Result Units 06/29/24  0639 06/30/24  0730 07/01/24  0501   WBC K/uL 11.91 15.34* 12.96*   Hemoglobin g/dL 7.4* 7.9* 7.1*   Hematocrit % 23.7* 25.5* 23.6*   Platelets K/uL 452* 510* 445   Gran % % 88.9* 82.5* 79.3*   Lymph % % 5.5* 8.4* 9.3*   Mono % % 1.7* 3.5* 4.5   Eosinophil % % 3.4 5.0 6.3   Basophil % % 0.2 0.3 0.2   Differential Method  Automated Automated Automated       Metabolic Panel (last 72 hours):  Recent Labs   Lab Result Units 06/29/24  0639 06/30/24  1257 07/01/24  0501   Sodium mmol/L 138 139 137   Potassium mmol/L 3.5 3.5 3.4*   Chloride mmol/L 100 99 100   CO2 mmol/L 30* 31* 31*   Glucose mg/dL 204* 166* 139*   BUN mg/dL 8 7* 7*    Creatinine mg/dL 0.8 0.8 0.9   Magnesium mg/dL 1.5* 2.0  --        Vancomycin Administrations:  vancomycin given in the last 96 hours                     vancomycin 1,250 mg in D5W 250 mL IVPB (Vial-Mate) (mg) 1,250 mg New Bag 06/29/24 1421     1,250 mg New Bag  0025    vancomycin (VANCOCIN) 2,250 mg in D5W 500 mL IVPB (mg) 2,250 mg New Bag 06/28/24 1300                    Microbiologic Results:  Microbiology Results (last 7 days)       Procedure Component Value Units Date/Time    AFB Culture & Smear [[id number]] Collected: 07/01/24 1241    Order Status: Sent Specimen: Bone from Foot, Right Updated: 07/01/24 1308    Gram stain [[id number]] Collected: 07/01/24 1241    Order Status: Sent Specimen: Bone from Foot, Right Updated: 07/01/24 1308    Fungus culture [[id number]] Collected: 07/01/24 1241    Order Status: Sent Specimen: Bone from Foot, Right Updated: 07/01/24 1307    Aerobic culture [[id number]] Collected: 07/01/24 1241    Order Status: Sent Specimen: Bone from Foot, Right Updated: 07/01/24 1307    Culture, Anaerobic [[id number]] Collected: 07/01/24 1241    Order Status: Sent Specimen: Bone from Foot, Right Updated: 07/01/24 1307    Blood culture [[id number]] Collected: 06/26/24 2344    Order Status: Completed Specimen: Blood Updated: 07/01/24 1212     Blood Culture, Routine No Growth to date      No Growth to date      No Growth to date      No Growth to date      No Growth to date    Blood culture [[id number]] Collected: 06/26/24 2344    Order Status: Completed Specimen: Blood Updated: 07/01/24 1212     Blood Culture, Routine No Growth to date      No Growth to date      No Growth to date      No Growth to date      No Growth to date    Blood culture [[id number]] Collected: 06/28/24 2252    Order Status: Completed Specimen: Blood from Peripheral, Hand, Right Updated: 07/01/24 1012     Blood Culture, Routine No Growth to date      No Growth to date      No Growth to date    Narrative:      Collection has  been rescheduled by [email or web address] at 06/28/2024 12:19 Reason:   Unable to collect .  Collection has been rescheduled by KML at 06/28/2024 16:08 Reason:   Unable to collect notified nurse Ludmila  Collection has been rescheduled by [email or web address] at 06/28/2024 12:19 Reason:   Unable to collect .  Collection has been rescheduled by KML at 06/28/2024 16:08 Reason:   Unable to collect notified nurse Keys    Blood culture [[id number]] Collected: 06/28/24 2251    Order Status: Completed Specimen: Blood from Peripheral, Hand, Right Updated: 07/01/24 1012     Blood Culture, Routine No Growth to date      No Growth to date      No Growth to date    Narrative:      Collection has been rescheduled by [email or web address] at 06/28/2024 12:19 Reason:   Unable to collect .  Collection has been rescheduled by KML at 06/28/2024 16:08 Reason:   Unable to collect notified nurse Keys  Collection has been rescheduled by [email or web address] at 06/28/2024 12:19 Reason:   Unable to collect .  Collection has been rescheduled by KML at 06/28/2024 16:08 Reason:   Unable to collect notified nurse Keys

## 2024-07-01 NOTE — ASSESSMENT & PLAN NOTE
- Consulted ID  - IV abx ordered---vancomycin and zosyn  - Blood cultures from 6/26 with no growth to date  -Appreciate IDs plan below:  -if he is refusing debridement or bone biopsy would treat empirically with vanco, cefepime, and po flagyl   -will need 6 weeks of therapy  -while outpatient will need weekly cbc, cmp, crp ,and vanco trough faxed to [phone] (or can be monitored at nursing [place]

## 2024-07-01 NOTE — ASSESSMENT & PLAN NOTE
Patient's anemia is currently uncontrolled. Has 1 u PRBC ordered. Etiology likely d/t Iron deficiency  Current CBC reviewed-   Lab Results   Component Value Date    HGB 7.1 (L) 07/01/2024    HCT 23.6 (L) 07/01/2024     Monitor serial CBC and transfuse if patient becomes hemodynamically unstable, symptomatic or H/H drops below 7/21.  -attempted blood transfusion yesterday however patient developed chest transfusion was stopped.  Will order 1 pack of packed red blood cells this morning and premedicate prior.  Orders for p.r.n. Lasix to be given in between units.  --consulted GI.  --s/p endoscopy on 6/28:  Impression:            - Normal esophagus.                          - Erosive gastropathy with no stigmata of recent                          bleeding. Biopsied.                          - Normal examined duodenum.   Recommendation:        - Return patient to [facility] for [facility] care.                          - Resume previous diet.                          - Continue present medications.                          - Await pathology results.                          - Observe patient's clinical course.                          - Perform a colonoscopy at appointment to be                          scheduled.

## 2024-07-01 NOTE — PLAN OF CARE
Rounded on pt. When medically ready for discharge, possible IV antibiotic therapy at NH. Pt is a resident of [facility]. CM will continue to follow pt throughout this hospital admission and provide any discharge needs.   Future Appointments   Date Time Provider Department Center   7/2/2024  1:30 PM Keerthi Hardin FNP [facility]      07/01/24 1517   Rounds   Attendance Provider;Nurse    Discharge Plan A Return to nursing home   Why the patient remains in the hospital Requires continued medical care   Transition of Care Barriers None

## 2024-07-01 NOTE — PROGRESS NOTES
Pharmacokinetic Assessment Follow Up: IV Vancomycin    Vancomycin serum concentration assessment(s):    The random level was drawn correctly and can be used to guide therapy at this time. The measurement is above the desired definitive target range of 10 to 15 mcg/mL.    Vancomycin Regimen Plan:    Re-dose when the random level is less than 15 mcg/mL, next level to be drawn at 1200 on 7/1    Drug levels (last 3 results):  Recent Labs   Lab Result Units 06/30/24  0029 07/01/24  0042   Vancomycin, Random ug/mL  --  15.9   Vancomycin-Trough ug/mL 27.0*  --        Pharmacy will continue to follow and monitor vancomycin.    Please contact pharmacy at extension [phone] for questions regarding this assessment.    Thank you for the consult,   Dennise Maria       Patient brief summary:  Saji Castañeda is a 74 y.o. male initiated on antimicrobial therapy with IV Vancomycin for treatment of skin & soft tissue infection    The patient's current regimen is on hold d/t supratherapeutic level    Drug Allergies:   Review of patient's allergies indicates:  No Known Allergies    Actual Body Weight:   91.6 kg    Renal Function:   Estimated Creatinine Clearance: 89.3 mL/min (based on SCr of 0.8 mg/dL).,     Dialysis Method (if applicable):  N/A    CBC (last 72 hours):  Recent Labs   Lab Result Units 06/28/24  0523 06/29/24  0639 06/30/24  0730   WBC K/uL 8.54 11.91 15.34*   Hemoglobin g/dL 7.7* 7.4* 7.9*   Hematocrit % 24.8* 23.7* 25.5*   Platelets K/uL 520* 452* 510*   Gran % % 68.6 88.9* 82.5*   Lymph % % 17.6* 5.5* 8.4*   Mono % % 7.4 1.7* 3.5*   Eosinophil % % 5.2 3.4 5.0   Basophil % % 0.6 0.2 0.3   Differential Method  Automated Automated Automated       Metabolic Panel (last 72 hours):  Recent Labs   Lab Result Units 06/28/24  0523 06/29/24  0639 06/30/24  1257   Sodium mmol/L 141 138 139   Potassium mmol/L 3.7 3.5 3.5   Chloride mmol/L 100 100 99   CO2 mmol/L 30* 30* 31*   Glucose mg/dL 86 204* 166*   BUN mg/dL 9 8 7*    Creatinine mg/dL 0.7 0.8 0.8   Magnesium mg/dL 1.6 1.5* 2.0       Vancomycin Administrations:  vancomycin given in the last 96 hours                     vancomycin 1,250 mg in D5W 250 mL IVPB (Vial-Mate) (mg) 1,250 mg New Bag 06/29/24 1421     1,250 mg New Bag  0025    vancomycin (VANCOCIN) 2,250 mg in D5W 500 mL IVPB (mg) 2,250 mg New Bag 06/28/24 1300                    Microbiologic Results:  Microbiology Results (last 7 days)       Procedure Component Value Units Date/Time    Blood culture [[id number]] Collected: 06/26/24 2344    Order Status: Completed Specimen: Blood Updated: 06/30/24 1212     Blood Culture, Routine No Growth to date      No Growth to date      No Growth to date      No Growth to date    Blood culture [[id number]] Collected: 06/26/24 2344    Order Status: Completed Specimen: Blood Updated: 06/30/24 1212     Blood Culture, Routine No Growth to date      No Growth to date      No Growth to date      No Growth to date    Blood culture [[id number]] Collected: 06/28/24 2252    Order Status: Completed Specimen: Blood from Peripheral, Hand, Right Updated: 06/30/24 1012     Blood Culture, Routine No Growth to date      No Growth to date    Narrative:      Collection has been rescheduled by [email or web address] at 06/28/2024 12:19 Reason:   Unable to collect .  Collection has been rescheduled by KML at 06/28/2024 16:08 Reason:   Unable to collect notified nurse Ludmila  Collection has been rescheduled by [email or web address] at 06/28/2024 12:19 Reason:   Unable to collect .  Collection has been rescheduled by KML at 06/28/2024 16:08 Reason:   Unable to collect notified nurse Ludmila    Blood culture [[id number]] Collected: 06/28/24 2251    Order Status: Completed Specimen: Blood from Peripheral, Hand, Right Updated: 06/30/24 1012     Blood Culture, Routine No Growth to date      No Growth to date    Narrative:      Collection has been rescheduled by [email or web address] at 06/28/2024 12:19 Reason:   Unable to collect .  Collection has been rescheduled by  KML at 06/28/2024 16:08 Reason:   Unable to collect notified nurse Ludmila  Collection has been rescheduled by [email or web address] at 06/28/2024 12:19 Reason:   Unable to collect .  Collection has been rescheduled by KML at 06/28/2024 16:08 Reason:   Unable to collect notified nurse Ludmila

## 2024-07-01 NOTE — PROGRESS NOTES
"[facility]    CC: anemia    HPI: Saji Castañeda is a 74 y.o. male who presented from nursing home after lab draw showed significant anemia. He reports feeling weak over the past week but overall reports being in normal state of health. He denies over GI bleeding.     Interval: Patient seen at bedside after chart review resting comfortably. He c/o discomfort from his fung catheter but otherwise has no complaints at this time. EGD done 06/28/24 demonstrated erosive gastropathy w/o stigmata of recent bleeding. Colonoscopy unable to be performed at that time d/t patient not finishing prep.      Past Medical History  Past Medical History:   Diagnosis Date    Arthritis     legs    Bacteremia due to Gram-negative bacteria 3/23/2021    Diabetes mellitus     Diabetes mellitus, type 2     Hyperlipidemia     Hypertension     Osteomyelitis     Palliative care encounter 5/24/2023       Physical Examination  BP (!) 152/69   Pulse (!) 50   Temp 99 °F (37.2 °C)   Resp 20   Ht 5' 9" (1.753 m)   Wt 91.6 kg (201 lb 15.1 oz)   SpO2 97%   BMI 29.82 kg/m²   General appearance: alert, cooperative, no distress  HENT: NC/AT, non-icteric sclera  Lungs: Normal respiratory effort  Abdomen: Soft, non-tender; bowel sounds normoactive; no organomegaly  Skin: No jaundice    Labs:   Lab Results   Component Value Date    WBC 12.96 (H) 07/01/2024    HGB 7.1 (L) 07/01/2024    HCT 23.6 (L) 07/01/2024    MCV 83 07/01/2024     07/01/2024 06/27/24:  Fe: 46  Transferrin: 72  TIBC: 107  Saturated Fe: 43%  Ferritin: 697    Endoscopic Hx:  EGD (06/28/24):  - Erosive gastropathy appreciated w/o stigmata of recent bleeding. Normal esophagus and examined duodenum.      Assessment / Recommendations:   Saji Castañeda is a 74 y.o. male who presented from nursing home after lab draw showed significant anemia. He reports feeling weak over the past week but overall reports being in normal state of health. He denies overt GI bleeding. No " prior endoscopy. There was delay in transfusion due to patient having active CP during prbc administration in the ED. Previously on Eliquis for PVD. EGD performed 06/28/24 demonstrated erosive gastropathy.    Acute on Chronic Anemia Likely 2/2 Erosive Gastropathy  - Hgb 7.1 this morning  - Continue to monitor CBC and transfuse as indicated  - Continue PPI bid  - Await pathology results  - Plan for outpatient colonoscopy      Case discussed with Dr. Hanson, please appreciate attestation.    Rik Rader,   [facility] Medicine, PGY-III  [facility]

## 2024-07-01 NOTE — ASSESSMENT & PLAN NOTE
Patient with trial fibrillation which is uncontrolled currently with Calcium Channel Blocker. Patient is currently in atrial fibrillation.[id number] Score: 4. . Anticoagulation on hold 2/2 GI bleed.

## 2024-07-01 NOTE — ASSESSMENT & PLAN NOTE
Saji Castañeda is a 74 y.o. male with s/p left AKA, and now right heel wound complicated by osteomyelitis.     - At this time patient amenable to conservative treatment only. Does not wish for any amputation or debridement.   - Per protocol and ID recommendations, bone biopsy obtained today of right calcaneus.  Procedure note below.  - Abx per ID  - Pain mgmt per primary  - Patient's right heel dressed with Hydrofera blue foam  - Agree with foam offloading of heels.  - Podiatry will follow    Future discharge recs:  Patient to follow up in [facility] Clinic outpatient, Podiatry will arrange.  SNF or  to apply bandaging as described: Remove foot dressings. Cleanse wound with normal saline or wound . Dry well. Apply gentian violet to natalie wound maceration if needed. Apply hydraferal blue ready to right heel wound. Next apply 4x4 gauze directly on top of wound bed. Place cast padding between toes to prevent pressure ulcers. Top with football dressing consisting 3 rolls of cast padding. Top with kerlix and secure with tape. NO COMPRESSION. Change 3 times per week  Abx plan per ID  Patient to LLE non weight bearing, RLE weight bearing as tolerated.  Darco shoe to right foot, weight-bearing forefoot touch to transfer.  Left AKA protection brace.  Patient to keep dressings clean dry and intact  Patient explained the importance of daily foot checks

## 2024-07-01 NOTE — ASSESSMENT & PLAN NOTE
Patient's FSGs are uncontrolled due to hyperglycemia on current medication regimen.  Last A1c reviewed-   Lab Results   Component Value Date    HGBA1C 6.8 (H) 06/26/2024     Most recent fingerstick glucose reviewed-   Recent Labs   Lab 06/30/24  1624 06/30/24  2117 07/01/24  0603 07/01/24  1130   POCTGLUCOSE 176* 187* 149* 161*       Current correctional scale  Low  Maintain anti-hyperglycemic dose as follows-   Antihyperglycemics (From admission, onward)    Start     Stop Route Frequency Ordered    06/26/24 1552  insulin aspart U-100 pen 0-5 Units         -- SubQ Before meals & nightly PRN 06/26/24 1453        Hold Oral hypoglycemics while patient is in the hospital.

## 2024-07-01 NOTE — SUBJECTIVE & OBJECTIVE
Interval History:  Patient seen on a.m. rounds.  No distress noted.  Appreciate consultants on this case.  Continue antibiotics for osteomyelitis treatment.  Following cultures.  He is status post EGD.  Doing well. Appreciate ID's eval and recs.    Review of Systems   Constitutional:  Positive for activity change, appetite change, fatigue and fever (Low-grade).   HENT:  Negative for trouble swallowing.    Respiratory:  Negative for cough.    Cardiovascular:  Negative for chest pain.   Gastrointestinal:  Negative for diarrhea, nausea and vomiting.   Genitourinary:  Negative for hematuria.   Musculoskeletal:  Positive for arthralgias and myalgias.   Skin:  Positive for wound.   Neurological:  Positive for weakness.   Psychiatric/Behavioral:  Negative for confusion.      Objective:     Vital Signs (Most Recent):  Temp: 98.4 °F (36.9 °C) (07/01/24 1130)  Pulse: 60 (07/01/24 1220)  Resp: 18 (07/01/24 1130)  BP: (!) 139/97 (07/01/24 1130)  SpO2: 100 % (07/01/24 1130) Vital Signs (24h Range):  Temp:  [98.4 °F (36.9 °C)-99 °F (37.2 °C)] 98.4 °F (36.9 °C)  Pulse:  [43-78] 60  Resp:  [16-20] 18  SpO2:  [95 %-100 %] 100 %  BP: (118-153)/(58-97) 139/97     Weight: 91.6 kg (201 lb 15.1 oz)  Body mass index is 29.82 kg/m².     Physical Exam  Vitals and nursing note reviewed.   Constitutional:       Appearance: He is obese. He is ill-appearing.   HENT:      Head: Normocephalic and atraumatic.      Mouth/Throat:      Mouth: Mucous membranes are moist.   Eyes:      Extraocular Movements: Extraocular movements intact.   Cardiovascular:      Rate and Rhythm: Normal rate.      Pulses: Normal pulses.      Heart sounds: Normal heart sounds.   Pulmonary:      Effort: Pulmonary effort is normal.      Breath sounds: Normal breath sounds.   Abdominal:      General: Bowel sounds are normal. There is no distension.      Palpations: Abdomen is soft.      Tenderness: There is no abdominal tenderness. There is no guarding.   Musculoskeletal:       Cervical back: Normal range of motion and neck supple.      Comments: Left AKA   Skin:     General: Skin is warm and dry.      Capillary Refill: Capillary refill takes 2 to 3 seconds.   Neurological:      Mental Status: He is alert and oriented to person, place, and time. Mental status is at baseline.      Motor: No weakness.   Psychiatric:         Mood and Affect: Mood normal.         Behavior: Behavior normal.                Significant Labs: All pertinent labs within the past 24 hours have been reviewed.    Significant Imaging: I have reviewed all pertinent imaging results/findings within the past 24 hours.

## 2024-07-01 NOTE — PROGRESS NOTES
[facility]  Podiatry  Progress Note    Patient Name: Saji Castañeda  MRN: [id number]  Admission Date: 6/26/2024  Hospital Length of Stay: 4 days  Attending Physician: kAira Li MD  Primary Care Provider: [facility] -     Subjective:     Interval History:  Patient seen and evaluated bedside by Podiatry; no adverse events overnight.  States that he slept well; denies nausea, chills.  Bone biopsy obtained today right calcaneus.  Patient still refuses surgical resection, opts for conservative bone biopsy and antibiotics, wound care.    Follow-up For: Procedure(s) (LRB):  EGD (ESOPHAGOGASTRODUODENOSCOPY) (N/A)    Post-Operative Day: 3 Days Post-Op    Scheduled Meds:   ferrous sulfate  1 tablet Oral Daily    gabapentin  300 mg Oral BID    isosorbide dinitrate  10 mg Oral TID    LIDOcaine (PF) 10 mg/ml (1%)  1 mL Other Once    mupirocin   Nasal BID    pantoprazole  40 mg Intravenous BID    piperacillin-tazobactam (Zosyn) IV (PEDS and ADULTS) (extended infusion is not appropriate)  4.5 g Intravenous Q8H    senna-docusate 8.6-50 mg  1 tablet Oral BID    sodium chloride 0.9%  10 mL Intravenous Q8H    tamsulosin  0.4 mg Oral Daily     Continuous Infusions:  PRN Meds:  Current Facility-Administered Medications:     0.9%  NaCl infusion (for blood administration), , Intravenous, Q24H PRN    0.9%  NaCl infusion (for blood administration), , Intravenous, Q24H PRN    acetaminophen, 650 mg, Oral, Q4H PRN    albuterol-ipratropium, 3 mL, Nebulization, Q4H PRN    aluminum-magnesium hydroxide-simethicone, 30 mL, Oral, QID PRN    dextrose 10%, 12.5 g, Intravenous, PRN    dextrose 10%, 25 g, Intravenous, PRN    diphenhydrAMINE, 25 mg, Oral, Q6H PRN    furosemide (LASIX) injection, 20 mg, Intravenous, PRN    glucagon (human recombinant), 1 mg, Intramuscular, PRN    glucose, 16 g, Oral, PRN    glucose, 24 g, Oral, PRN    HYDROcodone-acetaminophen, 1 tablet, Oral, Q6H PRN    insulin aspart U-100, 0-5 Units,  Subcutaneous, QID (AC + HS) PRN    melatonin, 6 mg, Oral, Nightly PRN    morphine, 1 mg, Intravenous, Q6H PRN    ondansetron, 8 mg, Oral, Q8H PRN    ondansetron, 4 mg, Intravenous, Q8H PRN    simethicone, 1 tablet, Oral, QID PRN    Pharmacy to dose Vancomycin consult, , , Once **AND** vancomycin - pharmacy to dose, , Intravenous, pharmacy to manage frequency    Review of Systems   Constitutional:  Negative for chills and fever.   Respiratory:  Negative for cough and shortness of breath.    Cardiovascular:  Negative for chest pain.   Gastrointestinal:  Negative for constipation, diarrhea, nausea and vomiting.   Skin:  Positive for wound.     Objective:     Vital Signs (Most Recent):  Temp: 98.4 °F (36.9 °C) (07/01/24 1130)  Pulse: 60 (07/01/24 1220)  Resp: 18 (07/01/24 1130)  BP: (!) 139/97 (07/01/24 1130)  SpO2: 100 % (07/01/24 1130) Vital Signs (24h Range):  Temp:  [98.4 °F (36.9 °C)-99 °F (37.2 °C)] 98.4 °F (36.9 °C)  Pulse:  [43-78] 60  Resp:  [16-20] 18  SpO2:  [95 %-100 %] 100 %  BP: (118-153)/(58-97) 139/97     Weight: 91.6 kg (201 lb 15.1 oz)  Body mass index is 29.82 kg/m².    Foot Exam    General  General Appearance: appears stated age and healthy   Orientation: alert and oriented to person, place, and time   Affect: appropriate       Right Foot/Ankle     Inspection and Palpation  Ecchymosis: none  Tenderness: none   Swelling: none   Skin Exam: skin intact;     Neurovascular  Dorsalis pedis: 1+  Posterior tibial: absent  Saphenous nerve sensation: diminished  Tibial nerve sensation: diminished  Superficial peroneal nerve sensation: diminished  Deep peroneal nerve sensation: diminished  Sural nerve sensation: diminished    Comments  Right heel present with pressure ulceration to the depth of bone; periwound maceration noted with fibrogranular wound bed.  No malodor noted.      Left Foot/Ankle      Inspection and Palpation  Skin Exam: skin intact;     Comments  Left AKA.      Laboratory:  BMP:   Recent Labs    Lab 06/30/24  1257 07/01/24  0501   * 139*    137   K 3.5 3.4*   CL 99 100   CO2 31* 31*   BUN 7* 7*   CREATININE 0.8 0.9   CALCIUM 8.3* 8.0*   MG 2.0  --      CBC:   Recent Labs   Lab 07/01/24  0501   WBC 12.96*   RBC 2.84*   HGB 7.1*   HCT 23.6*      MCV 83   MCH 25.0*   MCHC 30.1*     Microbiology Results (last 7 days)       Procedure Component Value Units Date/Time    AFB Culture & Smear [[id number]] Collected: 07/01/24 1241    Order Status: Sent Specimen: Bone from Foot, Right Updated: 07/01/24 1308    Gram stain [[id number]] Collected: 07/01/24 1241    Order Status: Sent Specimen: Bone from Foot, Right Updated: 07/01/24 1308    Fungus culture [[id number]] Collected: 07/01/24 1241    Order Status: Sent Specimen: Bone from Foot, Right Updated: 07/01/24 1307    Aerobic culture [[id number]] Collected: 07/01/24 1241    Order Status: Sent Specimen: Bone from Foot, Right Updated: 07/01/24 1307    Culture, Anaerobic [[id number]] Collected: 07/01/24 1241    Order Status: Sent Specimen: Bone from Foot, Right Updated: 07/01/24 1307    Blood culture [[id number]] Collected: 06/26/24 2344    Order Status: Completed Specimen: Blood Updated: 07/01/24 1212     Blood Culture, Routine No Growth to date      No Growth to date      No Growth to date      No Growth to date      No Growth to date    Blood culture [[id number]] Collected: 06/26/24 2344    Order Status: Completed Specimen: Blood Updated: 07/01/24 1212     Blood Culture, Routine No Growth to date      No Growth to date      No Growth to date      No Growth to date      No Growth to date    Blood culture [[id number]] Collected: 06/28/24 2252    Order Status: Completed Specimen: Blood from Peripheral, Hand, Right Updated: 07/01/24 1012     Blood Culture, Routine No Growth to date      No Growth to date      No Growth to date    Narrative:      Collection has been rescheduled by BRENDA at 06/28/2024 12:19 Reason:   Unable to collect .  Collection has  been rescheduled by KML at 06/28/2024 16:08 Reason:   Unable to collect notified nurse Ludmila  Collection has been rescheduled by [email or web address] at 06/28/2024 12:19 Reason:   Unable to collect .  Collection has been rescheduled by KML at 06/28/2024 16:08 Reason:   Unable to collect notified nurse Keys    Blood culture [[id number]] Collected: 06/28/24 2251    Order Status: Completed Specimen: Blood from Peripheral, Hand, Right Updated: 07/01/24 1012     Blood Culture, Routine No Growth to date      No Growth to date      No Growth to date    Narrative:      Collection has been rescheduled by [email or web address] at 06/28/2024 12:19 Reason:   Unable to collect .  Collection has been rescheduled by KML at 06/28/2024 16:08 Reason:   Unable to collect notified nurse Keys  Collection has been rescheduled by [email or web address] at 06/28/2024 12:19 Reason:   Unable to collect .  Collection has been rescheduled by KML at 06/28/2024 16:08 Reason:   Unable to collect notified nurse Keys          All pertinent labs reviewed within the last 24 hours.    Diagnostic Results:  I have reviewed all pertinent imaging results/findings within the past 24 hours.    Clinical Findings:    Assessment/Plan:     ID  Osteomyelitis of right lower extremity  Saji Castañeda is a 74 y.o. male with s/p left AKA, and now right heel wound complicated by osteomyelitis.     - At this time patient amenable to conservative treatment only. Does not wish for any amputation or debridement.   - Per protocol and ID recommendations, bone biopsy obtained today of right calcaneus.  Procedure note below.  - Abx per ID  - Pain mgmt per primary  - Patient's right heel dressed with Hydrofera blue foam  - Agree with foam offloading of heels.  - Podiatry will follow    Future discharge recs:  Patient to follow up in Podiatry Clinic outpatient, Podiatry will arrange.  SNF or  to apply bandaging as described: Remove foot dressings. Cleanse wound with normal saline or wound . Dry well. Apply gentian  violet to natalie wound maceration if needed. Apply hydraferal blue ready to right heel wound. Next apply 4x4 gauze directly on top of wound bed. Place cast padding between toes to prevent pressure ulcers. Top with football dressing consisting 3 rolls of cast padding. Top with kerlix and secure with tape. NO COMPRESSION. Change 3 times per week  Abx plan per ID  Patient to LLE non weight bearing, RLE weight bearing as tolerated.  Darco shoe to right foot, weight-bearing forefoot touch to transfer.  Left AKA protection brace.  Patient to keep dressings clean dry and intact  Patient explained the importance of daily foot checks      Bone biopsy    Date/Time: 2024 1:46 PM    Performed by: Gagan Franklin MD  Authorized by: Gagan Franklin MD    Consent Done?: Yes (Written)   Immediately prior to procedure a time out was called to verify the correct patient, procedure, equipment, support staff and site/side marked as required. .    Assistants?: No      Position: supine  Anesthesia: local infiltration  Local anesthetic: lidocaine 1% without epinephrine and bupivacaine 0.5% without epinephrine  Aspiration?: No    Biopsy?: Yes    Number of Specimens: 1  Estimated blood loss (cc):1  Patient tolerated the procedure well with no immediate complications.    Prior to procedure, written consent was obtained for right calcaneus bone biopsy with indications, risks, benefits discussed.  Consent form was reviewed and signed, witnessed appropriately.  Patient's name,  was used to confirm identity. Site of right heel was prepped with Betadine.  10 cc of 1:1 1% lidocaine:  0.5% bupivacaine was locally infiltrated to the superior aspect of the right heel.  Next, a [place] bone biopsy needle was utilized to obtain a sample of the right calcaneus, which was split and sent as a specimen to pathology and as a culture to microbiology.  Patient tolerated well with minimal bleeding, controlled via pressure hemostasis and silver nitrate  application.  Patient's right heel ulceration was cleansed with Vashe, dressed with Hydrofera blue foam, gauze padding, Kerlix, Ace to moderate compression.        Gagan Franklin DPM PGY-2  Podiatric Medicine & Surgery  [facility]  Secure Chat Preferred  Pager: [phone]    [facility] S[facility]

## 2024-07-01 NOTE — SUBJECTIVE & OBJECTIVE
Subjective:     Interval History:  Patient seen and evaluated bedside by Podiatry; no adverse events overnight.  States that he slept well; denies nausea, chills.  Bone biopsy obtained today right calcaneus.  Patient still refuses surgical resection, opts for conservative bone biopsy and antibiotics, wound care.    Follow-up For: Procedure(s) (LRB):  EGD (ESOPHAGOGASTRODUODENOSCOPY) (N/A)    Post-Operative Day: 3 Days Post-Op    Scheduled Meds:   ferrous sulfate  1 tablet Oral Daily    gabapentin  300 mg Oral BID    isosorbide dinitrate  10 mg Oral TID    LIDOcaine (PF) 10 mg/ml (1%)  1 mL Other Once    mupirocin   Nasal BID    pantoprazole  40 mg Intravenous BID    piperacillin-tazobactam (Zosyn) IV (PEDS and ADULTS) (extended infusion is not appropriate)  4.5 g Intravenous Q8H    senna-docusate 8.6-50 mg  1 tablet Oral BID    sodium chloride 0.9%  10 mL Intravenous Q8H    tamsulosin  0.4 mg Oral Daily     Continuous Infusions:  PRN Meds:  Current Facility-Administered Medications:     0.9%  NaCl infusion (for blood administration), , Intravenous, Q24H PRN    0.9%  NaCl infusion (for blood administration), , Intravenous, Q24H PRN    acetaminophen, 650 mg, Oral, Q4H PRN    albuterol-ipratropium, 3 mL, Nebulization, Q4H PRN    aluminum-magnesium hydroxide-simethicone, 30 mL, Oral, QID PRN    dextrose 10%, 12.5 g, Intravenous, PRN    dextrose 10%, 25 g, Intravenous, PRN    diphenhydrAMINE, 25 mg, Oral, Q6H PRN    furosemide (LASIX) injection, 20 mg, Intravenous, PRN    glucagon (human recombinant), 1 mg, Intramuscular, PRN    glucose, 16 g, Oral, PRN    glucose, 24 g, Oral, PRN    HYDROcodone-acetaminophen, 1 tablet, Oral, Q6H PRN    insulin aspart U-100, 0-5 Units, Subcutaneous, QID (AC + HS) PRN    melatonin, 6 mg, Oral, Nightly PRN    morphine, 1 mg, Intravenous, Q6H PRN    ondansetron, 8 mg, Oral, Q8H PRN    ondansetron, 4 mg, Intravenous, Q8H PRN    simethicone, 1 tablet, Oral, QID PRN    Pharmacy to dose  Vancomycin consult, , , Once **AND** vancomycin - pharmacy to dose, , Intravenous, pharmacy to manage frequency    Review of Systems   Constitutional:  Negative for chills and fever.   Respiratory:  Negative for cough and shortness of breath.    Cardiovascular:  Negative for chest pain.   Gastrointestinal:  Negative for constipation, diarrhea, nausea and vomiting.   Skin:  Positive for wound.     Objective:     Vital Signs (Most Recent):  Temp: 98.4 °F (36.9 °C) (07/01/24 1130)  Pulse: 60 (07/01/24 1220)  Resp: 18 (07/01/24 1130)  BP: (!) 139/97 (07/01/24 1130)  SpO2: 100 % (07/01/24 1130) Vital Signs (24h Range):  Temp:  [98.4 °F (36.9 °C)-99 °F (37.2 °C)] 98.4 °F (36.9 °C)  Pulse:  [43-78] 60  Resp:  [16-20] 18  SpO2:  [95 %-100 %] 100 %  BP: (118-153)/(58-97) 139/97     Weight: 91.6 kg (201 lb 15.1 oz)  Body mass index is 29.82 kg/m².    Foot Exam    General  General Appearance: appears stated age and healthy   Orientation: alert and oriented to person, place, and time   Affect: appropriate       Right Foot/Ankle     Inspection and Palpation  Ecchymosis: none  Tenderness: none   Swelling: none   Skin Exam: skin intact;     Neurovascular  Dorsalis pedis: 1+  Posterior tibial: absent  Saphenous nerve sensation: diminished  Tibial nerve sensation: diminished  Superficial peroneal nerve sensation: diminished  Deep peroneal nerve sensation: diminished  Sural nerve sensation: diminished    Comments  Right heel present with pressure ulceration to the depth of bone; periwound maceration noted with fibrogranular wound bed.  No malodor noted.      Left Foot/Ankle      Inspection and Palpation  Skin Exam: skin intact;     Comments  Left AKA.      Laboratory:  BMP:   Recent Labs   Lab 06/30/24  1257 07/01/24  0501   * 139*    137   K 3.5 3.4*   CL 99 100   CO2 31* 31*   BUN 7* 7*   CREATININE 0.8 0.9   CALCIUM 8.3* 8.0*   MG 2.0  --      CBC:   Recent Labs   Lab 07/01/24  0501   WBC 12.96*   RBC 2.84*   HGB 7.1*    HCT 23.6*      MCV 83   MCH 25.0*   MCHC 30.1*     Microbiology Results (last 7 days)       Procedure Component Value Units Date/Time    AFB Culture & Smear [[id number]] Collected: 07/01/24 1241    Order Status: Sent Specimen: Bone from Foot, Right Updated: 07/01/24 1308    Gram stain [[id number]] Collected: 07/01/24 1241    Order Status: Sent Specimen: Bone from Foot, Right Updated: 07/01/24 1308    Fungus culture [[id number]] Collected: 07/01/24 1241    Order Status: Sent Specimen: Bone from Foot, Right Updated: 07/01/24 1307    Aerobic culture [[id number]] Collected: 07/01/24 1241    Order Status: Sent Specimen: Bone from Foot, Right Updated: 07/01/24 1307    Culture, Anaerobic [[id number]] Collected: 07/01/24 1241    Order Status: Sent Specimen: Bone from Foot, Right Updated: 07/01/24 1307    Blood culture [[id number]] Collected: 06/26/24 2344    Order Status: Completed Specimen: Blood Updated: 07/01/24 1212     Blood Culture, Routine No Growth to date      No Growth to date      No Growth to date      No Growth to date      No Growth to date    Blood culture [[id number]] Collected: 06/26/24 2344    Order Status: Completed Specimen: Blood Updated: 07/01/24 1212     Blood Culture, Routine No Growth to date      No Growth to date      No Growth to date      No Growth to date      No Growth to date    Blood culture [[id number]] Collected: 06/28/24 2252    Order Status: Completed Specimen: Blood from Peripheral, Hand, Right Updated: 07/01/24 1012     Blood Culture, Routine No Growth to date      No Growth to date      No Growth to date    Narrative:      Collection has been rescheduled by [email or web address] at 06/28/2024 12:19 Reason:   Unable to collect .  Collection has been rescheduled by KML at 06/28/2024 16:08 Reason:   Unable to collect notified nurse Ludmila  Collection has been rescheduled by [email or web address] at 06/28/2024 12:19 Reason:   Unable to collect .  Collection has been rescheduled by KML at 06/28/2024 16:08 Reason:    Unable to collect notified nurse Keys    Blood culture [[id number]] Collected: 06/28/24 2251    Order Status: Completed Specimen: Blood from Peripheral, Hand, Right Updated: 07/01/24 1012     Blood Culture, Routine No Growth to date      No Growth to date      No Growth to date    Narrative:      Collection has been rescheduled by [email or web address] at 06/28/2024 12:19 Reason:   Unable to collect .  Collection has been rescheduled by KML at 06/28/2024 16:08 Reason:   Unable to collect notified nurse Keys  Collection has been rescheduled by [email or web address] at 06/28/2024 12:19 Reason:   Unable to collect .  Collection has been rescheduled by KML at 06/28/2024 16:08 Reason:   Unable to collect notified nurse Keys          All pertinent labs reviewed within the last 24 hours.    Diagnostic Results:  I have reviewed all pertinent imaging results/findings within the past 24 hours.    Clinical Findings:

## 2024-07-01 NOTE — ASSESSMENT & PLAN NOTE
Chronic, uncontrolled. Latest blood pressure and vitals reviewed-     Temp:  [98.4 °F (36.9 °C)-99 °F (37.2 °C)]   Pulse:  [43-78]   Resp:  [16-20]   BP: (118-153)/(58-97)   SpO2:  [95 %-100 %] .   Home meds for hypertension were reviewed and noted below.   Hypertension Medications               furosemide (LASIX) 20 MG tablet Take 1 tablet (20 mg total) by mouth once daily.    NIFEdipine (PROCARDIA-XL) 90 MG (OSM) 24 hr tablet Take 1 tablet (90 mg total) by mouth once daily.    bumetanide (BUMEX) 0.5 MG Tab Take 0.5 mg by mouth daily as needed.    bumetanide (BUMEX) 1 MG tablet Take by mouth. unknown    hydrALAZINE (APRESOLINE) 100 MG tablet Take by mouth. unknown    isosorbide dinitrate (ISORDIL) 10 MG tablet Take 1 tablet (10 mg total) by mouth 3 (three) times daily. Hold until follow up with a provider            While in the hospital, will manage blood pressure as follows; will resume meds as tolerated     Will utilize p.r.n. blood pressure medication only if patient's blood pressure greater than 160/100 and he develops symptoms such as worsening chest pain or shortness of breath.

## 2024-07-01 NOTE — SUBJECTIVE & OBJECTIVE
Interval History: No acute events    Review of Systems   Constitutional:  Positive for activity change, appetite change, fatigue and fever (Low-grade).   HENT:  Negative for trouble swallowing.    Respiratory:  Negative for cough.    Cardiovascular:  Negative for chest pain.   Gastrointestinal:  Negative for diarrhea, nausea and vomiting.   Genitourinary:  Negative for hematuria.   Musculoskeletal:  Positive for arthralgias and myalgias.   Skin:  Positive for wound.   Neurological:  Positive for weakness.   Psychiatric/Behavioral:  Negative for confusion.      Objective:     Vital Signs (Most Recent):  Temp: 98.4 °F (36.9 °C) (07/01/24 1130)  Pulse: 70 (07/01/24 1130)  Resp: 18 (07/01/24 1130)  BP: (!) 139/97 (07/01/24 1130)  SpO2: 100 % (07/01/24 1130) Vital Signs (24h Range):  Temp:  [98.4 °F (36.9 °C)-99 °F (37.2 °C)] 98.4 °F (36.9 °C)  Pulse:  [43-78] 70  Resp:  [16-20] 18  SpO2:  [95 %-100 %] 100 %  BP: (118-153)/(58-97) 139/97     Weight: 91.6 kg (201 lb 15.1 oz)  Body mass index is 29.82 kg/m².    Estimated Creatinine Clearance: 79.3 mL/min (based on SCr of 0.9 mg/dL).     Physical Exam  Vitals and nursing note reviewed.   Constitutional:       Appearance: He is obese.   HENT:      Head: Normocephalic and atraumatic.      Mouth/Throat:      Mouth: Mucous membranes are moist.   Eyes:      Extraocular Movements: Extraocular movements intact.   Cardiovascular:      Rate and Rhythm: Normal rate.      Pulses: Normal pulses.      Heart sounds: Normal heart sounds.   Pulmonary:      Effort: Pulmonary effort is normal.      Breath sounds: Normal breath sounds.   Abdominal:      General: Bowel sounds are normal. There is no distension.      Palpations: Abdomen is soft.      Tenderness: There is no abdominal tenderness. There is no guarding.   Musculoskeletal:      Cervical back: Normal range of motion and neck supple.      Comments: Left AKA   Skin:     General: Skin is warm and dry.      Capillary Refill: Capillary  refill takes 2 to 3 seconds.   Neurological:      Mental Status: He is alert and oriented to person, place, and time. Mental status is at baseline.      Motor: No weakness.   Psychiatric:         Mood and Affect: Mood normal.         Behavior: Behavior normal.          Significant Labs: All pertinent labs within the past 24 hours have been reviewed.    Significant Imaging: I have reviewed all pertinent imaging results/findings within the past 24 hours.

## 2024-07-02 LAB
ACID FAST MOD KINY STN SPEC: NORMAL
ANION GAP SERPL CALC-SCNC: 6 MMOL/L (ref 8–16)
BACTERIA BLD CULT: NORMAL
BACTERIA BLD CULT: NORMAL
BASOPHILS # BLD AUTO: 0.02 K/UL (ref 0–0.2)
BASOPHILS NFR BLD: 0.2 % (ref 0–1.9)
BUN SERPL-MCNC: 7 MG/DL (ref 8–23)
CALCIUM SERPL-MCNC: 8 MG/DL (ref 8.7–10.5)
CHLORIDE SERPL-SCNC: 100 MMOL/L (ref 95–110)
CO2 SERPL-SCNC: 32 MMOL/L (ref 23–29)
CREAT SERPL-MCNC: 0.8 MG/DL (ref 0.5–1.4)
DIFFERENTIAL METHOD BLD: ABNORMAL
EOSINOPHIL # BLD AUTO: 0.8 K/UL (ref 0–0.5)
EOSINOPHIL NFR BLD: 7.4 % (ref 0–8)
ERYTHROCYTE [DISTWIDTH] IN BLOOD BY AUTOMATED COUNT: 18.5 % (ref 11.5–14.5)
EST. GFR  (NO RACE VARIABLE): >60 ML/MIN/1.73 M^2
FINAL PATHOLOGIC DIAGNOSIS: NORMAL
GLUCOSE SERPL-MCNC: 171 MG/DL (ref 70–110)
GROSS: NORMAL
HCT VFR BLD AUTO: 24.9 % (ref 40–54)
HGB BLD-MCNC: 7.5 G/DL (ref 14–18)
IMM GRANULOCYTES # BLD AUTO: 0.06 K/UL (ref 0–0.04)
IMM GRANULOCYTES NFR BLD AUTO: 0.6 % (ref 0–0.5)
LYMPHOCYTES # BLD AUTO: 1.2 K/UL (ref 1–4.8)
LYMPHOCYTES NFR BLD: 12 % (ref 18–48)
Lab: NORMAL
MAGNESIUM SERPL-MCNC: 1.8 MG/DL (ref 1.6–2.6)
MCH RBC QN AUTO: 25.3 PG (ref 27–31)
MCHC RBC AUTO-ENTMCNC: 30.1 G/DL (ref 32–36)
MCV RBC AUTO: 84 FL (ref 82–98)
MONOCYTES # BLD AUTO: 0.5 K/UL (ref 0.3–1)
MONOCYTES NFR BLD: 5 % (ref 4–15)
MYCOBACTERIUM SPEC QL CULT: NORMAL
NEUTROPHILS # BLD AUTO: 7.6 K/UL (ref 1.8–7.7)
NEUTROPHILS NFR BLD: 74.8 % (ref 38–73)
NRBC BLD-RTO: 0 /100 WBC
PLATELET # BLD AUTO: 542 K/UL (ref 150–450)
PMV BLD AUTO: 8.7 FL (ref 9.2–12.9)
POCT GLUCOSE: 181 MG/DL (ref 70–110)
POCT GLUCOSE: 201 MG/DL (ref 70–110)
POCT GLUCOSE: 203 MG/DL (ref 70–110)
POCT GLUCOSE: 224 MG/DL (ref 70–110)
POTASSIUM SERPL-SCNC: 3.5 MMOL/L (ref 3.5–5.1)
RBC # BLD AUTO: 2.97 M/UL (ref 4.6–6.2)
SODIUM SERPL-SCNC: 138 MMOL/L (ref 136–145)
SUPPLEMENTAL DIAGNOSIS: NORMAL
WBC # BLD AUTO: 10.16 K/UL (ref 3.9–12.7)

## 2024-07-02 PROCEDURE — 63600175 PHARM REV CODE 636 W HCPCS: Performed by: NURSE PRACTITIONER

## 2024-07-02 PROCEDURE — 85025 COMPLETE CBC W/AUTO DIFF WBC: CPT | Performed by: NURSE PRACTITIONER

## 2024-07-02 PROCEDURE — 63600175 PHARM REV CODE 636 W HCPCS: Performed by: STUDENT IN AN ORGANIZED HEALTH CARE EDUCATION/TRAINING PROGRAM

## 2024-07-02 PROCEDURE — 80048 BASIC METABOLIC PNL TOTAL CA: CPT | Performed by: NURSE PRACTITIONER

## 2024-07-02 PROCEDURE — 83735 ASSAY OF MAGNESIUM: CPT | Performed by: NURSE PRACTITIONER

## 2024-07-02 PROCEDURE — A4216 STERILE WATER/SALINE, 10 ML: HCPCS | Performed by: NURSE PRACTITIONER

## 2024-07-02 PROCEDURE — 25000003 PHARM REV CODE 250: Performed by: STUDENT IN AN ORGANIZED HEALTH CARE EDUCATION/TRAINING PROGRAM

## 2024-07-02 PROCEDURE — 25000003 PHARM REV CODE 250: Performed by: NURSE PRACTITIONER

## 2024-07-02 PROCEDURE — 94761 N-INVAS EAR/PLS OXIMETRY MLT: CPT

## 2024-07-02 PROCEDURE — 99900035 HC TECH TIME PER 15 MIN (STAT)

## 2024-07-02 PROCEDURE — 94760 N-INVAS EAR/PLS OXIMETRY 1: CPT

## 2024-07-02 PROCEDURE — 21400001 HC TELEMETRY ROOM

## 2024-07-02 RX ORDER — ASPIRIN 81 MG/1
81 TABLET ORAL DAILY
Status: DISCONTINUED | OUTPATIENT
Start: 2024-07-03 | End: 2024-07-11 | Stop reason: HOSPADM

## 2024-07-02 RX ADMIN — ACETAMINOPHEN 650 MG: 325 TABLET ORAL at 07:07

## 2024-07-02 RX ADMIN — VANCOMYCIN HYDROCHLORIDE 1250 MG: 1.25 INJECTION, POWDER, LYOPHILIZED, FOR SOLUTION INTRAVENOUS at 05:07

## 2024-07-02 RX ADMIN — CEFEPIME 2 G: 2 INJECTION, POWDER, FOR SOLUTION INTRAVENOUS at 10:07

## 2024-07-02 RX ADMIN — Medication 10 ML: at 01:07

## 2024-07-02 RX ADMIN — MUPIROCIN: 20 OINTMENT TOPICAL at 08:07

## 2024-07-02 RX ADMIN — ISOSORBIDE DINITRATE 10 MG: 10 TABLET ORAL at 10:07

## 2024-07-02 RX ADMIN — CEFEPIME 2 G: 2 INJECTION, POWDER, FOR SOLUTION INTRAVENOUS at 08:07

## 2024-07-02 RX ADMIN — HYDROCODONE BITARTRATE AND ACETAMINOPHEN 1 TABLET: 10; 325 TABLET ORAL at 10:07

## 2024-07-02 RX ADMIN — PANTOPRAZOLE SODIUM 40 MG: 40 INJECTION, POWDER, LYOPHILIZED, FOR SOLUTION INTRAVENOUS at 08:07

## 2024-07-02 RX ADMIN — TAMSULOSIN HYDROCHLORIDE 0.4 MG: 0.4 CAPSULE ORAL at 08:07

## 2024-07-02 RX ADMIN — Medication 10 ML: at 10:07

## 2024-07-02 RX ADMIN — METRONIDAZOLE 500 MG: 500 TABLET ORAL at 05:07

## 2024-07-02 RX ADMIN — MUPIROCIN: 20 OINTMENT TOPICAL at 10:07

## 2024-07-02 RX ADMIN — APIXABAN 5 MG: 5 TABLET, FILM COATED ORAL at 08:07

## 2024-07-02 RX ADMIN — DOCUSATE SODIUM AND SENNOSIDES 1 TABLET: 8.6; 5 TABLET, FILM COATED ORAL at 08:07

## 2024-07-02 RX ADMIN — METRONIDAZOLE 500 MG: 500 TABLET ORAL at 01:07

## 2024-07-02 RX ADMIN — ISOSORBIDE DINITRATE 10 MG: 10 TABLET ORAL at 01:07

## 2024-07-02 RX ADMIN — GABAPENTIN 300 MG: 300 CAPSULE ORAL at 10:07

## 2024-07-02 RX ADMIN — METRONIDAZOLE 500 MG: 500 TABLET ORAL at 10:07

## 2024-07-02 RX ADMIN — ISOSORBIDE DINITRATE 10 MG: 10 TABLET ORAL at 08:07

## 2024-07-02 RX ADMIN — GABAPENTIN 300 MG: 300 CAPSULE ORAL at 08:07

## 2024-07-02 RX ADMIN — APIXABAN 5 MG: 5 TABLET, FILM COATED ORAL at 10:07

## 2024-07-02 RX ADMIN — CEFEPIME 2 G: 2 INJECTION, POWDER, FOR SOLUTION INTRAVENOUS at 04:07

## 2024-07-02 RX ADMIN — PANTOPRAZOLE SODIUM 40 MG: 40 INJECTION, POWDER, LYOPHILIZED, FOR SOLUTION INTRAVENOUS at 10:07

## 2024-07-02 RX ADMIN — FERROUS SULFATE TAB 325 MG (65 MG ELEMENTAL FE) 1 EACH: 325 (65 FE) TAB at 08:07

## 2024-07-02 NOTE — ASSESSMENT & PLAN NOTE
- Consulted ID, appreciate rec's  - IV abx ordered---vancomycin and zosyn  - Blood cultures from 6/26 with no growth to date  -Appreciate IDs plan below:  -if he is refusing debridement or bone biopsy would treat empirically with vanco, cefepime, and po flagyl   -will need 6 weeks of therapy  -while outpatient will need weekly cbc, cmp, crp ,and vanco trough faxed to [phone] (or can be monitored at nursing janeth

## 2024-07-02 NOTE — TELEPHONE ENCOUNTER
"Needs a colonoscopy afte rdischarge    Concern - 2 day prep, constipation  Eliquis use as well    Procedure: Colonoscopy    Diagnosis: Screening colonoscopy    Procedure Timin-12 weeks    *If within 4 weeks selected, please kunal as high priority*    *If greater than 12 weeks, please select "5-12 weeks" and delay sending until 3 months prior to requested date*    Location: Any Site - [place]    Additional Scheduling Information: Blood thinners / elqiuis    Prep Specifications:Extended/Constipation prep - failed inpatient prep, needs 2 day prep     Is the patient taking a GLP-1 Agonist:no    Have you attached a patient to this message: yes    "

## 2024-07-02 NOTE — ASSESSMENT & PLAN NOTE
Patient's anemia is currently uncontrolled. Has 1 u PRBC ordered. Etiology likely d/t Iron deficiency  Current CBC reviewed-   Lab Results   Component Value Date    HGB 7.5 (L) 07/02/2024    HCT 24.9 (L) 07/02/2024     Monitor serial CBC and transfuse if patient becomes hemodynamically unstable, symptomatic or H/H drops below 7/21.  -attempted blood transfusion yesterday however patient developed chest transfusion was stopped.  Will order 1 pack of packed red blood cells this morning and premedicate prior.  Orders for p.r.n. Lasix to be given in between units.  --consulted GI.  --s/p endoscopy on 6/28:  Impression:            - Normal esophagus.                          - Erosive gastropathy with no stigmata of recent                          bleeding. Biopsied.                          - Normal examined duodenum.   Recommendation:        - Return patient to hospital [facility] for ongoing care.                          - Resume previous diet.                          - Continue present medications.                          - Await pathology results.                          - Observe patient's clinical course.                          - Perform a colonoscopy at appointment to be                          scheduled.

## 2024-07-02 NOTE — PROGRESS NOTES
[facility] [facility] Surg  Adult Nutrition  Progress Note    SUMMARY       Recommendations    Recommendation:  1. Add Kvng and beneprotein BID to promote wound healing.   2. Encourage intake at meals as tolerated. 3. Monitor weight/labs.   4. RD to continue to follow to monitor po intake    Goals:  Pt will tolerate diet with at least 75% intake at meals by RD follow up  Nutrition Goal Status: new  Communication of RD Recs: reviewed with RN (Malu)    Assessment and Plan  Nutrition Problem  Increased protein needs    Related to (etiology):   Wound healing    Signs and Symptoms (as evidenced by):   R toe, R heel, B buttocks, L ischial wounds     Interventions:  Collaboration with other providers  Increased protein diet    Nutrition Diagnosis Status:   Continues      Malnutrition Assessment  Weight Loss (Malnutrition):  (23% x 6 months)   Orbital Region (Subcutaneous Fat Loss): well nourished  Upper Arm Region (Subcutaneous Fat Loss): well nourished  Thoracic and Lumbar Region: well nourished   [place] Region (Muscle Loss): well nourished  Clavicle Bone Region (Muscle Loss): well nourished  Clavicle and Acromion Bone Region (Muscle Loss): well nourished  Scapular Bone Region (Muscle Loss): well nourished  Dorsal Hand (Muscle Loss): well nourished  Patellar Region (Muscle Loss): well nourished  Anterior Thigh Region (Muscle Loss): well nourished  Posterior Calf Region (Muscle Loss): well nourished     Reason for Assessment  Reason For Assessment: RD follow-up  Diagnosis:  (anemia)  Relevant Medical History: arthritis, DM, HLD, osteomyelitis, HTN, pacemaker, L AKA  General Information Comments: Pt now on 2000 calorie ADA Cardiac diet. Noted % intake at meals. Reports fod at his NH is terrible. Noted 63lb weight loss x 6 months (includes AKA in March). Tomas 13- L buttocks, R heel, R buttocks wounds. s/p EGD 6/28. L AKA. NFPE completed 6/27-nourished  Nutrition Discharge Planning: pt to d/c on ADA Cardiac diet    Nutrition  "Risk Screen  Nutrition Risk Screen: no indicators present    Nutrition/Diet History  Food Preferences: no Buddhist or cultural food prefs identified  Spiritual, Cultural Beliefs, Adventism Practices, Values that Affect Care: no  Factors Affecting Nutritional Intake: altered gastrointestinal function    Anthropometrics  Temp: 98.6 °F (37 °C)  Height Method: Stated  Height: 5' 9" (175.3 cm)  Height (inches): 69 in  Weight Method: Bed Scale  Weight: 91.6 kg (201 lb 15.1 oz)  Weight (lb): 201.94 lb  Ideal Body Weight (IBW), Male: 160 lb  % Ideal Body Weight, Male (lb): 126.21 %  BMI (Calculated): 29.8  BMI Grade: 25 - 29.9 - overweight  Usual Body Weight (UBW), k.7 kg (12/15)  % Usual Body Weight: 76.68  % Weight Change From Usual Weight: -23.48 %  Amputation %: 16  Total Amputation %: 16  Amputation Ideal Body Weight (IBW), Male (lb): 144 lb  Amputee BMI (kg/m2): 34.01 kg/m2     Lab/Procedures/Meds  Pertinent Labs Reviewed: reviewed  Pertinent Labs Comments: K 3.4L, BUN 7L, Glu 139H, Ca 8.0L, Alb 1.6L  Pertinent Medications Reviewed: reviewed  Pertinent Medications Comments: ferrous sulfate, gabapentin, pantoprazole, zosyn, senna    Estimated/Assessed Needs  Weight Used For Calorie Calculations: 72.7 kg (160 lb 4.4 oz) (IBW pre amputation)  Energy Calorie Requirements (kcal): 2137 (35 kcal/kg IBW with AKA)  Energy Need Method: Kcal/kg  Protein Requirements: 79g (1.3g/kg IBW with amputation)  Weight Used For Protein Calculations: 72.7 kg (160 lb 4.4 oz) (IBW pre amputation)  Estimated Fluid Requirement Method: RDA Method  RDA Method (mL): 2137  CHO Requirement: 225g    Nutrition Prescription Ordered  Current Diet Order: 2000 calorie ADA Cardiac    Evaluation of Received Nutrient/Fluid Intake  I/O: 540/925  Energy Calories Required: meeting needs  Protein Required: meeting needs  Fluid Required: meeting needs  Comments: LBM 6/30  % Intake of Estimated Energy Needs: 75 - 100 %  % Meal Intake: 75 - 100 " %    Nutrition Risk  Level of Risk/Frequency of Follow-up:  (2xweekly)     Monitor and Evaluation  Food and Nutrient Intake: food and beverage intake  Food and Nutrient Adminstration: diet order  Physical Activity and Function: nutrition-related ADLs and IADLs  Anthropometric Measurements: weight  Biochemical Data, Medical Tests and Procedures: electrolyte and renal panel  Nutrition-Focused Physical Findings: overall appearance     Nutrition Related Social Determinants of Health: SDOH: Other: receives meals from NH     Nutrition Follow-Up  RD Follow-up?: Yes

## 2024-07-02 NOTE — PROGRESS NOTES
[facility] Medicine  Progress Note    Patient Name: Saji Castañeda  MRN: [id number]  Patient Class: IP- Inpatient   Admission Date: 6/26/2024  Length of Stay: 5 days  Attending Physician: Efrain Coleman MD  Primary Care Provider: [facility] -        Subjective:     Principal Problem:Anemia        HPI:  Saji Castañeda is a 73 y/o M who has a pmh of Arthritis, Bacteremia due to Gram-negative bacteria, Diabetes mellitus, Diabetes mellitus, type 2, Hyperlipidemia, Hypertension, Osteomyelitis, and Palliative care encounter. presenting to the Emergency Department for referral. Patient was sent to the ED via EMS from [facility] for low blood count. Patient denies any chest pain, shortness on breath, weakness, fatigue, dizziness. His hemoglobin was 6.3--will order 1 u PRBC ordered. FOBT pending. Will admit to the [facility] service for further care.    Overview/Hospital Course:  He was admitted to the hospital medicine service for further care.  Patient noted with anemia as well as osteomyelitis on admission.  Patient underwent a EGD on 06/28 which noted Erosive gastropathy.  Anticoagulation is on hold until cleared by GI.  He did receive 1 unit of packed red blood cells on admission.  CT of his right foot noted with osteomyelitis.  Appreciate Podiatry is evaluation---status post bone biopsy on today---also appreciate infectious disease.    Interval History: Nursing notes reviewed and no acute events overnight. Pt w/ bradycardia this AM, VS otherwise stable. Resumed home AC. H/H stable 7.5/24.9, continue to monitor. Bone bx cultures pending. Continue current IV Abx regimen.    Review of Systems   Constitutional:  Positive for activity change, appetite change, fatigue and fever (Low-grade).   HENT:  Negative for trouble swallowing.    Respiratory:  Negative for cough.    Cardiovascular:  Negative for chest pain.   Gastrointestinal:  Negative for diarrhea, nausea and vomiting.    Genitourinary:  Negative for hematuria.   Musculoskeletal:  Positive for arthralgias and myalgias.   Skin:  Positive for wound.   Neurological:  Positive for weakness.   Psychiatric/Behavioral:  Negative for confusion.      Objective:     Vital Signs (Most Recent):  Temp: 98.6 °F (37 °C) (07/02/24 1532)  Pulse: 63 (07/02/24 1620)  Resp: 20 (07/02/24 1532)  BP: (!) 116/56 (07/02/24 1532)  SpO2: 96 % (07/02/24 1532) Vital Signs (24h Range):  Temp:  [97.9 °F (36.6 °C)-98.6 °F (37 °C)] 98.6 °F (37 °C)  Pulse:  [47-63] 63  Resp:  [18-20] 20  SpO2:  [95 %-99 %] 96 %  BP: (116-156)/(56-72) 116/56     Weight: 91.6 kg (201 lb 15.1 oz)  Body mass index is 29.82 kg/m².    Intake/Output Summary (Last 24 hours) at 7/2/2024 1635  Last data filed at 7/2/2024 1337  Gross per 24 hour   Intake 905.28 ml   Output 1175 ml   Net -269.72 ml         Physical Exam  Vitals and nursing note reviewed.   Constitutional:       Appearance: He is obese. He is ill-appearing.   HENT:      Head: Normocephalic and atraumatic.      Mouth/Throat:      Mouth: Mucous membranes are moist.   Eyes:      Extraocular Movements: Extraocular movements intact.   Cardiovascular:      Rate and Rhythm: Bradycardia present.      Pulses: Normal pulses.      Heart sounds: Normal heart sounds.   Pulmonary:      Effort: Pulmonary effort is normal.      Breath sounds: Normal breath sounds.   Abdominal:      General: Bowel sounds are normal. There is no distension.      Palpations: Abdomen is soft.      Tenderness: There is no abdominal tenderness. There is no guarding.   Musculoskeletal:      Cervical back: Normal range of motion and neck supple.      Comments: Left AKA   Skin:     General: Skin is warm and dry.      Capillary Refill: Capillary refill takes 2 to 3 seconds.   Neurological:      Mental Status: He is alert and oriented to person, place, and time. Mental status is at baseline.      Motor: No weakness.   Psychiatric:         Mood and Affect: Mood normal.          Behavior: Behavior normal.             Significant Labs: All pertinent labs within the past 24 hours have been reviewed.    Significant Imaging: I have reviewed all pertinent imaging results/findings within the past 24 hours.    Assessment/Plan:      * Anemia  Patient's anemia is currently uncontrolled. Has 1 u PRBC ordered. Etiology likely d/t Iron deficiency  Current CBC reviewed-   Lab Results   Component Value Date    HGB 7.5 (L) 07/02/2024    HCT 24.9 (L) 07/02/2024     Monitor serial CBC and transfuse if patient becomes hemodynamically unstable, symptomatic or H/H drops below 7/21.  -attempted blood transfusion yesterday however patient developed chest transfusion was stopped.  Will order 1 pack of packed red blood cells this morning and premedicate prior.  Orders for p.r.n. Lasix to be given in between units.  --consulted GI.  --s/p endoscopy on 6/28:  Impression:            - Normal esophagus.                          - Erosive gastropathy with no stigmata of recent                          bleeding. Biopsied.                          - Normal examined duodenum.   Recommendation:        - Return patient to [facility] for ongoing care.                          - Resume previous diet.                          - Continue present medications.                          - Await pathology results.                          - Observe patient's clinical course.                          - Perform a colonoscopy at appointment to be                          scheduled.     Pressure injury of buttock, stage 4        Pressure injury of right heel, stage 4        Pressure injury of ankle, unstageable-lateral  Pressure injury of buttock, stage 4   Pressure injury of right heel, stage 4     - Appreciate IP wound care  - Pressure injury prevention interventions - waffle overlay and heel boot  - Vashe wet-to-dry to bilateral buttocks, R buttock, and R heel wound BID  - Mepilex border to L posterior thigh/gluteal fold  3x/week  - Podiatry consulted for R heel wound   --also noted with a bilateral buttock full thickness pressure injury  --Right heel and sacrum CT ordered to r/o OM  --appreciate cardiology's evaluation      Wounds, multiple  -patient with wounds to his sacrum and heel  -wound care consulted  -Ulcer care ordered      Chronic anticoagulation  Continue home OAC      Stage 3b chronic kidney disease  Creatine stable for now. BMP reviewed- noted Estimated Creatinine Clearance: 89.3 mL/min (based on SCr of 0.8 mg/dL). according to latest data. Based on current GFR, CKD stage is stage 2 - GFR 60-89.  Monitor UOP and serial BMP and adjust therapy as needed. Renally dose meds. Avoid nephrotoxic medications and procedures.    Paroxysmal atrial fibrillation  Patient with trial fibrillation which is uncontrolled currently with Calcium Channel Blocker. Patient is currently in atrial fibrillation.[id number] Score: 4. . Anticoagulation done with Eliquis    Osteomyelitis of right lower extremity  - Consulted ID, appreciate rec's  - IV abx ordered---vancomycin and zosyn  - Blood cultures from 6/26 with no growth to date  -Appreciate IDs plan below:  -if he is refusing debridement or bone biopsy would treat empirically with vanco, cefepime, and po flagyl   -will need 6 weeks of therapy  -while outpatient will need weekly cbc, cmp, crp ,and vanco trough faxed to [phone] (or can be monitored at nursing [facility]        Chronic deep vein thrombosis (DVT) of right upper extremity  -home AC resumed  -follow      PVD (peripheral vascular disease)        Peripheral arterial disease  PVD (peripheral vascular disease)    -Restarted OAC and ASA      Type 2 diabetes mellitus, with long-term current use of insulin    Patient's FSGs are uncontrolled due to hyperglycemia on current medication regimen.  Last A1c reviewed-   Lab Results   Component Value Date    HGBA1C 6.8 (H) 06/26/2024     Most recent fingerstick glucose reviewed-   Recent Labs    Lab 07/01/24 2043 07/02/24  0538 07/02/24  1123 07/02/24  1533   POCTGLUCOSE 224* 203* 181* 201*       Current correctional scale  Low  Maintain anti-hyperglycemic dose as follows-   Antihyperglycemics (From admission, onward)      Start     Stop Route Frequency Ordered    06/26/24 1552  insulin aspart U-100 pen 0-5 Units         -- SubQ Before meals & nightly PRN 06/26/24 1453          Hold Oral hypoglycemics while patient is in the hospital.      Essential hypertension  Chronic, uncontrolled. Latest blood pressure and vitals reviewed-     Temp:  [97.9 °F (36.6 °C)-98.6 °F (37 °C)]   Pulse:  [47-63]   Resp:  [18-20]   BP: (116-156)/(56-72)   SpO2:  [95 %-99 %] .   Home meds for hypertension were reviewed and noted below.   Hypertension Medications               furosemide (LASIX) 20 MG tablet Take 1 tablet (20 mg total) by mouth once daily.    NIFEdipine (PROCARDIA-XL) 90 MG (OSM) 24 hr tablet Take 1 tablet (90 mg total) by mouth once daily.    bumetanide (BUMEX) 0.5 MG Tab Take 0.5 mg by mouth daily as needed.    bumetanide (BUMEX) 1 MG tablet Take by mouth. unknown    hydrALAZINE (APRESOLINE) 100 MG tablet Take by mouth. unknown    isosorbide dinitrate (ISORDIL) 10 MG tablet Take 1 tablet (10 mg total) by mouth 3 (three) times daily. Hold until follow up with a provider            While in the hospital, will manage blood pressure as follows; will resume meds as tolerated     Will utilize p.r.n. blood pressure medication only if patient's blood pressure greater than 160/100 and he develops symptoms such as worsening chest pain or shortness of breath.      VTE Risk Mitigation (From admission, onward)           Ordered     apixaban tablet 5 mg  2 times daily         07/01/24 2139     Reason for No Pharmacological VTE Prophylaxis  Once        Question:  Reasons:  Answer:  Risk of Bleeding    06/26/24 1453     IP VTE HIGH RISK PATIENT  Once         06/26/24 1453     Place sequential compression device  Until  discontinued         06/26/24 1453                    Discharge Planning   OXANA:      Code Status: Full Code   Is the patient medically ready for discharge?:     Reason for patient still in hospital (select all that apply): Patient trending condition, Laboratory test, Treatment, and Consult recommendations  Discharge Plan A: Return to nursing home                  Ervin Valdovinos PA-C  Department of Hospital Medicine   [facility]

## 2024-07-02 NOTE — ASSESSMENT & PLAN NOTE
Patient's FSGs are uncontrolled due to hyperglycemia on current medication regimen.  Last A1c reviewed-   Lab Results   Component Value Date    HGBA1C 6.8 (H) 06/26/2024     Most recent fingerstick glucose reviewed-   Recent Labs   Lab 07/01/24  2043 07/02/24  0538 07/02/24  1123 07/02/24  1533   POCTGLUCOSE 224* 203* 181* 201*       Current correctional scale  Low  Maintain anti-hyperglycemic dose as follows-   Antihyperglycemics (From admission, onward)    Start     Stop Route Frequency Ordered    06/26/24 1552  insulin aspart U-100 pen 0-5 Units         -- SubQ Before meals & nightly PRN 06/26/24 1453        Hold Oral hypoglycemics while patient is in the hospital.

## 2024-07-02 NOTE — PLAN OF CARE
Recommendation:  1. Add Kvng and beneprotein BID to promote wound healing.   2. Encourage intake at meals as tolerated. 3. Monitor weight/labs.   4. RD to continue to follow to monitor po intake    Goals:  Pt will tolerate diet with at least 75% intake at meals by RD follow up  Nutrition Goal Status: new

## 2024-07-02 NOTE — SUBJECTIVE & OBJECTIVE
Interval History: Nursing notes reviewed and no acute events overnight. Pt w/ bradycardia this AM, VS otherwise stable. Resumed home AC. H/H stable 7.5/24.9, continue to monitor. Bone bx cultures pending. Continue current IV Abx regimen.    Review of Systems   Constitutional:  Positive for activity change, appetite change, fatigue and fever (Low-grade).   HENT:  Negative for trouble swallowing.    Respiratory:  Negative for cough.    Cardiovascular:  Negative for chest pain.   Gastrointestinal:  Negative for diarrhea, nausea and vomiting.   Genitourinary:  Negative for hematuria.   Musculoskeletal:  Positive for arthralgias and myalgias.   Skin:  Positive for wound.   Neurological:  Positive for weakness.   Psychiatric/Behavioral:  Negative for confusion.      Objective:     Vital Signs (Most Recent):  Temp: 98.6 °F (37 °C) (07/02/24 1532)  Pulse: 63 (07/02/24 1620)  Resp: 20 (07/02/24 1532)  BP: (!) 116/56 (07/02/24 1532)  SpO2: 96 % (07/02/24 1532) Vital Signs (24h Range):  Temp:  [97.9 °F (36.6 °C)-98.6 °F (37 °C)] 98.6 °F (37 °C)  Pulse:  [47-63] 63  Resp:  [18-20] 20  SpO2:  [95 %-99 %] 96 %  BP: (116-156)/(56-72) 116/56     Weight: 91.6 kg (201 lb 15.1 oz)  Body mass index is 29.82 kg/m².    Intake/Output Summary (Last 24 hours) at 7/2/2024 1635  Last data filed at 7/2/2024 1337  Gross per 24 hour   Intake 905.28 ml   Output 1175 ml   Net -269.72 ml         Physical Exam  Vitals and nursing note reviewed.   Constitutional:       Appearance: He is obese. He is ill-appearing.   HENT:      Head: Normocephalic and atraumatic.      Mouth/Throat:      Mouth: Mucous membranes are moist.   Eyes:      Extraocular Movements: Extraocular movements intact.   Cardiovascular:      Rate and Rhythm: Bradycardia present.      Pulses: Normal pulses.      Heart sounds: Normal heart sounds.   Pulmonary:      Effort: Pulmonary effort is normal.      Breath sounds: Normal breath sounds.   Abdominal:      General: Bowel sounds are  normal. There is no distension.      Palpations: Abdomen is soft.      Tenderness: There is no abdominal tenderness. There is no guarding.   Musculoskeletal:      Cervical back: Normal range of motion and neck supple.      Comments: Left AKA   Skin:     General: Skin is warm and dry.      Capillary Refill: Capillary refill takes 2 to 3 seconds.   Neurological:      Mental Status: He is alert and oriented to person, place, and time. Mental status is at baseline.      Motor: No weakness.   Psychiatric:         Mood and Affect: Mood normal.         Behavior: Behavior normal.             Significant Labs: All pertinent labs within the past 24 hours have been reviewed.    Significant Imaging: I have reviewed all pertinent imaging results/findings within the past 24 hours.

## 2024-07-02 NOTE — ASSESSMENT & PLAN NOTE
Chronic, uncontrolled. Latest blood pressure and vitals reviewed-     Temp:  [97.9 °F (36.6 °C)-98.6 °F (37 °C)]   Pulse:  [47-63]   Resp:  [18-20]   BP: (116-156)/(56-72)   SpO2:  [95 %-99 %] .   Home meds for hypertension were reviewed and noted below.   Hypertension Medications               furosemide (LASIX) 20 MG tablet Take 1 tablet (20 mg total) by mouth once daily.    NIFEdipine (PROCARDIA-XL) 90 MG (OSM) 24 hr tablet Take 1 tablet (90 mg total) by mouth once daily.    bumetanide (BUMEX) 0.5 MG Tab Take 0.5 mg by mouth daily as needed.    bumetanide (BUMEX) 1 MG tablet Take by mouth. unknown    hydrALAZINE (APRESOLINE) 100 MG tablet Take by mouth. unknown    isosorbide dinitrate (ISORDIL) 10 MG tablet Take 1 tablet (10 mg total) by mouth 3 (three) times daily. Hold until follow up with a provider            While in the hospital, will manage blood pressure as follows; will resume meds as tolerated     Will utilize p.r.n. blood pressure medication only if patient's blood pressure greater than 160/100 and he develops symptoms such as worsening chest pain or shortness of breath.

## 2024-07-02 NOTE — PLAN OF CARE
Rounded at bedside. Pt voiced no complaints. Plans are when medically ready for discharge, he will return back to [facility]. Cultures pending. Per MD notes, pt will need PICC placement and IV antibiotics when dc back to NH. CM will continue to follow pt throughout this hospitalization and provide any discharge needs.   Future Appointments   Date Time Provider Department Center   7/2/2024  1:30 PM Keerthi Hardin FNP [facility] UROLOGY [facility]      07/02/24 1148   Rounds   Attendance Nurse    Discharge Plan A Return to nursing home   Why the patient remains in the hospital Requires continued medical care   Transition of Care Barriers Transportation

## 2024-07-02 NOTE — ASSESSMENT & PLAN NOTE
Patient with trial fibrillation which is uncontrolled currently with Calcium Channel Blocker. Patient is currently in atrial fibrillation.[id number] Score: 4. . Anticoagulation done with Eliquis

## 2024-07-02 NOTE — CARE UPDATE
Spoke to Dr. Prasanth BAILEY regarding resuming the patients anticoagulation. Dr. Rader states he discussed this case with Dr. Hanson and LYNN has Ok'd the patients Eliquis. Will resume home anticoagulation and will monitor h/h.    Lo Melgar NP

## 2024-07-03 LAB
BASOPHILS # BLD AUTO: 0.04 K/UL (ref 0–0.2)
BASOPHILS NFR BLD: 0.4 % (ref 0–1.9)
DIFFERENTIAL METHOD BLD: ABNORMAL
EOSINOPHIL # BLD AUTO: 0.8 K/UL (ref 0–0.5)
EOSINOPHIL NFR BLD: 8.6 % (ref 0–8)
ERYTHROCYTE [DISTWIDTH] IN BLOOD BY AUTOMATED COUNT: 18.5 % (ref 11.5–14.5)
FINAL PATHOLOGIC DIAGNOSIS: NORMAL
GROSS: NORMAL
HCT VFR BLD AUTO: 27.1 % (ref 40–54)
HGB BLD-MCNC: 8 G/DL (ref 14–18)
IMM GRANULOCYTES # BLD AUTO: 0.04 K/UL (ref 0–0.04)
IMM GRANULOCYTES NFR BLD AUTO: 0.4 % (ref 0–0.5)
LYMPHOCYTES # BLD AUTO: 1.3 K/UL (ref 1–4.8)
LYMPHOCYTES NFR BLD: 13.6 % (ref 18–48)
Lab: NORMAL
MCH RBC QN AUTO: 25.1 PG (ref 27–31)
MCHC RBC AUTO-ENTMCNC: 29.5 G/DL (ref 32–36)
MCV RBC AUTO: 85 FL (ref 82–98)
MONOCYTES # BLD AUTO: 0.5 K/UL (ref 0.3–1)
MONOCYTES NFR BLD: 5.1 % (ref 4–15)
NEUTROPHILS # BLD AUTO: 6.8 K/UL (ref 1.8–7.7)
NEUTROPHILS NFR BLD: 71.9 % (ref 38–73)
NRBC BLD-RTO: 0 /100 WBC
PLATELET # BLD AUTO: 560 K/UL (ref 150–450)
PMV BLD AUTO: 8.6 FL (ref 9.2–12.9)
POCT GLUCOSE: 161 MG/DL (ref 70–110)
POCT GLUCOSE: 170 MG/DL (ref 70–110)
POCT GLUCOSE: 188 MG/DL (ref 70–110)
POCT GLUCOSE: 210 MG/DL (ref 70–110)
RBC # BLD AUTO: 3.19 M/UL (ref 4.6–6.2)
VANCOMYCIN TROUGH SERPL-MCNC: 15 UG/ML (ref 10–22)
WBC # BLD AUTO: 9.43 K/UL (ref 3.9–12.7)

## 2024-07-03 PROCEDURE — 94761 N-INVAS EAR/PLS OXIMETRY MLT: CPT

## 2024-07-03 PROCEDURE — 63600175 PHARM REV CODE 636 W HCPCS: Performed by: HOSPITALIST

## 2024-07-03 PROCEDURE — 25000003 PHARM REV CODE 250: Performed by: STUDENT IN AN ORGANIZED HEALTH CARE EDUCATION/TRAINING PROGRAM

## 2024-07-03 PROCEDURE — 99232 SBSQ HOSP IP/OBS MODERATE 35: CPT | Mod: ,,, | Performed by: INTERNAL MEDICINE

## 2024-07-03 PROCEDURE — 85025 COMPLETE CBC W/AUTO DIFF WBC: CPT | Performed by: NURSE PRACTITIONER

## 2024-07-03 PROCEDURE — A4216 STERILE WATER/SALINE, 10 ML: HCPCS | Performed by: NURSE PRACTITIONER

## 2024-07-03 PROCEDURE — 63600175 PHARM REV CODE 636 W HCPCS: Performed by: STUDENT IN AN ORGANIZED HEALTH CARE EDUCATION/TRAINING PROGRAM

## 2024-07-03 PROCEDURE — C1751 CATH, INF, PER/CENT/MIDLINE: HCPCS

## 2024-07-03 PROCEDURE — 21400001 HC TELEMETRY ROOM

## 2024-07-03 PROCEDURE — 25000003 PHARM REV CODE 250

## 2024-07-03 PROCEDURE — 36415 COLL VENOUS BLD VENIPUNCTURE: CPT | Performed by: HOSPITALIST

## 2024-07-03 PROCEDURE — 99900035 HC TECH TIME PER 15 MIN (STAT)

## 2024-07-03 PROCEDURE — 25000003 PHARM REV CODE 250: Performed by: HOSPITALIST

## 2024-07-03 PROCEDURE — 63600175 PHARM REV CODE 636 W HCPCS: Performed by: NURSE PRACTITIONER

## 2024-07-03 PROCEDURE — 36415 COLL VENOUS BLD VENIPUNCTURE: CPT | Performed by: NURSE PRACTITIONER

## 2024-07-03 PROCEDURE — 36410 VNPNXR 3YR/> PHY/QHP DX/THER: CPT

## 2024-07-03 PROCEDURE — 25000003 PHARM REV CODE 250: Performed by: NURSE PRACTITIONER

## 2024-07-03 PROCEDURE — 80202 ASSAY OF VANCOMYCIN: CPT | Performed by: HOSPITALIST

## 2024-07-03 RX ADMIN — CEFEPIME 2 G: 2 INJECTION, POWDER, FOR SOLUTION INTRAVENOUS at 05:07

## 2024-07-03 RX ADMIN — ISOSORBIDE DINITRATE 10 MG: 10 TABLET ORAL at 03:07

## 2024-07-03 RX ADMIN — METRONIDAZOLE 500 MG: 500 TABLET ORAL at 06:07

## 2024-07-03 RX ADMIN — FERROUS SULFATE TAB 325 MG (65 MG ELEMENTAL FE) 1 EACH: 325 (65 FE) TAB at 08:07

## 2024-07-03 RX ADMIN — PANTOPRAZOLE SODIUM 40 MG: 40 INJECTION, POWDER, LYOPHILIZED, FOR SOLUTION INTRAVENOUS at 09:07

## 2024-07-03 RX ADMIN — CEFEPIME 2 G: 2 INJECTION, POWDER, FOR SOLUTION INTRAVENOUS at 06:07

## 2024-07-03 RX ADMIN — PANTOPRAZOLE SODIUM 40 MG: 40 INJECTION, POWDER, LYOPHILIZED, FOR SOLUTION INTRAVENOUS at 08:07

## 2024-07-03 RX ADMIN — MUPIROCIN: 20 OINTMENT TOPICAL at 08:07

## 2024-07-03 RX ADMIN — CEFEPIME 2 G: 2 INJECTION, POWDER, FOR SOLUTION INTRAVENOUS at 11:07

## 2024-07-03 RX ADMIN — MUPIROCIN: 20 OINTMENT TOPICAL at 09:07

## 2024-07-03 RX ADMIN — ISOSORBIDE DINITRATE 10 MG: 10 TABLET ORAL at 08:07

## 2024-07-03 RX ADMIN — METRONIDAZOLE 500 MG: 500 TABLET ORAL at 09:07

## 2024-07-03 RX ADMIN — VANCOMYCIN HYDROCHLORIDE 1250 MG: 1.25 INJECTION, POWDER, LYOPHILIZED, FOR SOLUTION INTRAVENOUS at 06:07

## 2024-07-03 RX ADMIN — HYDROCODONE BITARTRATE AND ACETAMINOPHEN 1 TABLET: 10; 325 TABLET ORAL at 05:07

## 2024-07-03 RX ADMIN — METRONIDAZOLE 500 MG: 500 TABLET ORAL at 03:07

## 2024-07-03 RX ADMIN — Medication 10 ML: at 02:07

## 2024-07-03 RX ADMIN — GABAPENTIN 300 MG: 300 CAPSULE ORAL at 09:07

## 2024-07-03 RX ADMIN — ASPIRIN 81 MG: 81 TABLET, COATED ORAL at 08:07

## 2024-07-03 RX ADMIN — Medication 10 ML: at 09:07

## 2024-07-03 RX ADMIN — ISOSORBIDE DINITRATE 10 MG: 10 TABLET ORAL at 09:07

## 2024-07-03 RX ADMIN — APIXABAN 5 MG: 5 TABLET, FILM COATED ORAL at 08:07

## 2024-07-03 RX ADMIN — HYDROCODONE BITARTRATE AND ACETAMINOPHEN 1 TABLET: 10; 325 TABLET ORAL at 04:07

## 2024-07-03 RX ADMIN — TAMSULOSIN HYDROCHLORIDE 0.4 MG: 0.4 CAPSULE ORAL at 08:07

## 2024-07-03 RX ADMIN — APIXABAN 5 MG: 5 TABLET, FILM COATED ORAL at 09:07

## 2024-07-03 RX ADMIN — GABAPENTIN 300 MG: 300 CAPSULE ORAL at 08:07

## 2024-07-03 NOTE — ASSESSMENT & PLAN NOTE
Patient's FSGs are uncontrolled due to hyperglycemia on current medication regimen.  Last A1c reviewed-   Lab Results   Component Value Date    HGBA1C 6.8 (H) 06/26/2024     Most recent fingerstick glucose reviewed-   Recent Labs   Lab 07/02/24 2055 07/03/24  0601 07/03/24  1112 07/03/24  1556   POCTGLUCOSE 224* 170* 161* 188*       Current correctional scale  Low  Maintain anti-hyperglycemic dose as follows-   Antihyperglycemics (From admission, onward)    Start     Stop Route Frequency Ordered    06/26/24 1552  insulin aspart U-100 pen 0-5 Units         -- SubQ Before meals & nightly PRN 06/26/24 1453        Hold Oral hypoglycemics while patient is in the hospital.     Additional handoff

## 2024-07-03 NOTE — PROGRESS NOTES
Pharmacokinetic Assessment Follow Up: IV Vancomycin    Vancomycin serum concentration assessment(s):    The trough level was drawn correctly and can be used to guide therapy at this time. The measurement is within the desired definitive target range of 15 to 20 mcg/mL.    Vancomycin Regimen Plan:    Continue regimen to Vancomycin 1250 mg IV every 24 hours with next serum trough concentration measured at 1630 prior to third dose on 07/05/2024    Drug levels (last 3 results):  Recent Labs   Lab Result Units 07/01/24  0042 07/01/24  1241 07/03/24  1633   Vancomycin, Random ug/mL 15.9 12.4  --    Vancomycin-Trough ug/mL  --   --  15.0       Pharmacy will continue to follow and monitor vancomycin.    Please contact pharmacy at extension [phone] for questions regarding this assessment.    Thank you for the consult,   Edilia Vuong       Patient brief summary:  Saji Castañeda is a 74 y.o. male initiated on antimicrobial therapy with IV Vancomycin for treatment of bone/joint infection    The patient's current regimen is vancomycin 1250 mg ivpb q24h    Drug Allergies:   Review of patient's allergies indicates:  No Known Allergies    Actual Body Weight:   91.6 kg    Renal Function:   Estimated Creatinine Clearance: 89.3 mL/min (based on SCr of 0.8 mg/dL).,     Dialysis Method (if applicable):  N/A    CBC (last 72 hours):  Recent Labs   Lab Result Units 07/01/24  0501 07/01/24  1602 07/02/24  0522 07/03/24  0522   WBC K/uL 12.96*  --  10.16 9.43   Hemoglobin g/dL 7.1* 7.3* 7.5* 8.0*   Hematocrit % 23.6* 24.7* 24.9* 27.1*   Platelets K/uL 445  --  542* 560*   Gran % % 79.3*  --  74.8* 71.9   Lymph % % 9.3*  --  12.0* 13.6*   Mono % % 4.5  --  5.0 5.1   Eosinophil % % 6.3  --  7.4 8.6*   Basophil % % 0.2  --  0.2 0.4   Differential Method  Automated  --  Automated Automated       Metabolic Panel (last 72 hours):  Recent Labs   Lab Result Units 07/01/24  0501 07/02/24  0522   Sodium mmol/L 137 138   Potassium mmol/L 3.4*  3.5   Chloride mmol/L 100 100   CO2 mmol/L 31* 32*   Glucose mg/dL 139* 171*   BUN mg/dL 7* 7*   Creatinine mg/dL 0.9 0.8   Magnesium mg/dL  --  1.8       Vancomycin Administrations:  vancomycin given in the last 96 hours                     vancomycin 1,250 mg in D5W 250 mL IVPB (Vial-Mate) ()  Restarted 07/02/24 1931      Restarted  1842     1,250 mg New Bag  1723      Restarted 07/01/24 1625     1,250 mg New Bag  1548                    Microbiologic Results:  Microbiology Results (last 7 days)       Procedure Component Value Units Date/Time    Aerobic culture [[id number]]  (Abnormal) Collected: 07/01/24 1241    Order Status: Completed Specimen: Bone from Foot, Right Updated: 07/03/24 1418     Aerobic Bacterial Culture STAPHYLOCOCCUS AUREUS  Few  Susceptibility pending        GRAM NEGATIVE TODD  Few  Identification and susceptibility pending      Narrative:      Right calcaneus bone    Culture, Anaerobic [[id number]] Collected: 07/01/24 1241    Order Status: Completed Specimen: Bone from Foot, Right Updated: 07/03/24 1104     Anaerobic Culture Culture in progress    Narrative:      Right calcaneus bone    Blood culture [[id number]] Collected: 06/28/24 2251    Order Status: Completed Specimen: Blood from Peripheral, Hand, Right Updated: 07/03/24 1012     Blood Culture, Routine No Growth to date      No Growth to date      No Growth to date      No Growth to date      No Growth to date    Narrative:      Collection has been rescheduled by [email or web address] at 06/28/2024 12:19 Reason:   Unable to collect .  Collection has been rescheduled by KML at 06/28/2024 16:08 Reason:   Unable to collect notified nurse Ludmila  Collection has been rescheduled by [email or web address] at 06/28/2024 12:19 Reason:   Unable to collect .  Collection has been rescheduled by KML at 06/28/2024 16:08 Reason:   Unable to collect notified nurse Ludmila    Blood culture [[id number]] Collected: 06/28/24 2252    Order Status: Completed Specimen: Blood from Peripheral, Hand,  Right Updated: 07/03/24 1012     Blood Culture, Routine No Growth to date      No Growth to date      No Growth to date      No Growth to date      No Growth to date    Narrative:      Collection has been rescheduled by [email or web address] at 06/28/2024 12:19 Reason:   Unable to collect .  Collection has been rescheduled by KML at 06/28/2024 16:08 Reason:   Unable to collect notified nurse Ludmila  Collection has been rescheduled by [email or web address] at 06/28/2024 12:19 Reason:   Unable to collect .  Collection has been rescheduled by KML at 06/28/2024 16:08 Reason:   Unable to collect notified nurse Ludmila    AFB Culture & Smear [[id number]] Collected: 07/01/24 1241    Order Status: Completed Specimen: Bone from Foot, Right Updated: 07/02/24 2127     AFB Culture & Smear Culture in progress     AFB CULTURE STAIN No acid fast bacilli seen.    Narrative:      Right calcaneus bone    Blood culture [[id number]] Collected: 06/26/24 2344    Order Status: Completed Specimen: Blood Updated: 07/02/24 1212     Blood Culture, Routine No growth after 5 days.    Blood culture [[id number]] Collected: 06/26/24 2344    Order Status: Completed Specimen: Blood Updated: 07/02/24 1212     Blood Culture, Routine No growth after 5 days.    Fungus culture [[id number]] Collected: 07/01/24 1241    Order Status: Completed Specimen: Bone from Foot, Right Updated: 07/02/24 1038     Fungus (Mycology) Culture Culture in progress    Narrative:      Right calcaneus bone    Gram stain [[id number]] Collected: 07/01/24 1241    Order Status: Completed Specimen: Bone from Foot, Right Updated: 07/01/24 2157     Gram Stain Result No WBC's, epithelial cells or organisms seen    Narrative:      Right calcaneus bone

## 2024-07-03 NOTE — PROGRESS NOTES
[facility]  Progress Note    Patient Name: Saji Castañeda  MRN: [id number]  Patient Class: IP- Inpatient   Admission Date: 6/26/2024  Length of Stay: 6 days  Attending Physician: Efrain Coleman MD  Primary Care Provider: No primary care provider on file.        Subjective:     Principal Problem:Anemia        HPI:  Saji Castañeda is a 75 y/o M who has a pmh of Arthritis, Bacteremia due to Gram-negative bacteria, Diabetes mellitus, Diabetes mellitus, type 2, Hyperlipidemia, Hypertension, Osteomyelitis, and Palliative care encounter. presenting to the Emergency Department for referral. Patient was sent to the ED via EMS from [facility] for low blood count. Patient denies any chest pain, shortness on breath, weakness, fatigue, dizziness. His hemoglobin was 6.3--will order 1 u PRBC ordered. FOBT pending. Will admit to the [facility] service for further care.    Overview/Hospital Course:  He was admitted to the hospital medicine service for further care.  Patient noted with anemia as well as osteomyelitis on admission.  Patient underwent a EGD on 06/28 which noted Erosive gastropathy.  Anticoagulation is on hold until cleared by GI.  He did receive 1 unit of packed red blood cells on admission.  CT of his right foot noted with osteomyelitis.  Appreciate Podiatry is evaluation---status post bone biopsy on today---also appreciate infectious disease.    Interval History: Nursing notes reviewed and no acute events overnight. VSS. Pt w/ no c/o today. H/H stable. Bone culture growing G- rods, others NGTD. Awaiting ID recs.     Review of Systems   Constitutional:  Positive for activity change, appetite change, fatigue and fever (Low-grade).   HENT:  Negative for trouble swallowing.    Respiratory:  Negative for cough.    Cardiovascular:  Negative for chest pain.   Gastrointestinal:  Negative for diarrhea, nausea and vomiting.   Genitourinary:  Negative for hematuria.   Musculoskeletal:   Positive for arthralgias and myalgias.   Skin:  Positive for wound.   Neurological:  Positive for weakness.   Psychiatric/Behavioral:  Negative for confusion.      Objective:     Vital Signs (Most Recent):  Temp: 98.3 °F (36.8 °C) (07/03/24 0734)  Pulse: 80 (07/03/24 0734)  Resp: 16 (07/03/24 0734)  BP: (!) 109/55 (07/03/24 0734)  SpO2: 99 % (07/03/24 0734) Vital Signs (24h Range):  Temp:  [98.3 °F (36.8 °C)-98.6 °F (37 °C)] 98.3 °F (36.8 °C)  Pulse:  [45-88] 80  Resp:  [16-20] 16  SpO2:  [95 %-99 %] 99 %  BP: (109-157)/(55-72) 109/55     Weight: 91.6 kg (201 lb 15.1 oz)  Body mass index is 29.82 kg/m².    Intake/Output Summary (Last 24 hours) at 7/3/2024 0932  Last data filed at 7/3/2024 0637  Gross per 24 hour   Intake 442.65 ml   Output 1725 ml   Net -1282.35 ml         Physical Exam  Vitals and nursing note reviewed.   Constitutional:       Appearance: He is obese. He is ill-appearing.   HENT:      Head: Normocephalic and atraumatic.      Mouth/Throat:      Mouth: Mucous membranes are moist.   Eyes:      Extraocular Movements: Extraocular movements intact.   Cardiovascular:      Rate and Rhythm: Normal rate.      Pulses: Normal pulses.      Heart sounds: Normal heart sounds.   Pulmonary:      Effort: Pulmonary effort is normal.      Breath sounds: Normal breath sounds.   Abdominal:      General: Bowel sounds are normal. There is no distension.      Palpations: Abdomen is soft.      Tenderness: There is no abdominal tenderness. There is no guarding.   Musculoskeletal:      Cervical back: Normal range of motion and neck supple.      Comments: Left AKA   Skin:     General: Skin is warm and dry.      Capillary Refill: Capillary refill takes 2 to 3 seconds.   Neurological:      Mental Status: He is alert and oriented to person, place, and time. Mental status is at baseline.      Motor: No weakness.   Psychiatric:         Mood and Affect: Mood normal.         Behavior: Behavior normal.             Significant Labs:  All pertinent labs within the past 24 hours have been reviewed.    Significant Imaging: I have reviewed all pertinent imaging results/findings within the past 24 hours.    Assessment/Plan:      * Anemia  Patient's anemia is currently controlled. Has 1 u PRBC ordered. Etiology likely d/t Iron deficiency.   Current CBC reviewed-   Lab Results   Component Value Date    HGB 8.0 (L) 07/03/2024    HCT 27.1 (L) 07/03/2024     Monitor serial CBC and transfuse if patient becomes hemodynamically unstable, symptomatic or H/H drops below 7/21.  -attempted blood transfusion yesterday however patient developed chest transfusion was stopped.  Will order 1 pack of packed red blood cells this morning and premedicate prior.  Orders for p.r.n. Lasix to be given in between units.  PPI BID  --consulted GI.  --s/p endoscopy on 6/28:  Impression:            - Normal esophagus.                          - Erosive gastropathy with no stigmata of recent                          bleeding. Biopsied.                          - Normal examined duodenum.   Recommendation:        - Return patient to hospital [facility] for ongoing care.                          - Resume previous diet.                          - Continue present medications.                          - Await pathology results.                          - Observe patient's clinical course.                          - Perform a colonoscopy at appointment to be                          scheduled.     Pressure injury of buttock, stage 4        Pressure injury of right heel, stage 4        Pressure injury of ankle, unstageable-lateral  Pressure injury of buttock, stage 4   Pressure injury of right heel, stage 4     - Appreciate IP wound care  - Pressure injury prevention interventions - waffle overlay and heel boot  - Vashe wet-to-dry to bilateral buttocks, R buttock, and R heel wound BID  - Mepilex border to L posterior thigh/gluteal fold 3x/week  - Podiatry consulted for R heel wound   --also  noted with a bilateral buttock full thickness pressure injury  --Right heel and sacrum CT ordered to r/o OM  --appreciate cardiology's evaluation      Wounds, multiple  -patient with wounds to his sacrum and heel  -wound care consulted  -Ulcer care ordered      Chronic anticoagulation  Continue home OAC      Stage 3b chronic kidney disease  Creatine stable for now. BMP reviewed- noted Estimated Creatinine Clearance: 89.3 mL/min (based on SCr of 0.8 mg/dL). according to latest data. Based on current GFR, CKD stage is stage 2 - GFR 60-89.  Monitor UOP and serial BMP and adjust therapy as needed. Renally dose meds. Avoid nephrotoxic medications and procedures.    Paroxysmal atrial fibrillation  Patient with trial fibrillation which is uncontrolled currently with Calcium Channel Blocker. Patient is currently in atrial fibrillation.[id number] Score: 4. . Anticoagulation done with Eliquis    Osteomyelitis of right lower extremity  - Consulted ID, appreciate rec's  - IV abx ordered---vancomycin and zosyn  - Blood cultures from 6/26 with no growth to date  - Bone culture growing G- rods  -Appreciate IDs plan below:  -if he is refusing debridement or bone biopsy would treat empirically with vanco, cefepime, and po flagyl   -will need 6 weeks of therapy  -while outpatient will need weekly cbc, cmp, crp ,and vanco trough faxed to [phone] (or can be monitored at nursing [facility]        Chronic deep vein thrombosis (DVT) of right upper extremity  -home AC resumed  -follow      PVD (peripheral vascular disease)        Peripheral arterial disease  PVD (peripheral vascular disease)    -Restarted OAC and ASA      Type 2 diabetes mellitus, with long-term current use of insulin    Patient's FSGs are uncontrolled due to hyperglycemia on current medication regimen.  Last A1c reviewed-   Lab Results   Component Value Date    HGBA1C 6.8 (H) 06/26/2024     Most recent fingerstick glucose reviewed-   Recent Labs   Lab 07/02/24 2055  07/03/24  0601 07/03/24  1112 07/03/24  1556   POCTGLUCOSE 224* 170* 161* 188*       Current correctional scale  Low  Maintain anti-hyperglycemic dose as follows-   Antihyperglycemics (From admission, onward)      Start     Stop Route Frequency Ordered    06/26/24 1552  insulin aspart U-100 pen 0-5 Units         -- SubQ Before meals & nightly PRN 06/26/24 1453          Hold Oral hypoglycemics while patient is in the hospital.      Essential hypertension  Chronic, uncontrolled. Latest blood pressure and vitals reviewed-     Temp:  [98.3 °F (36.8 °C)-98.6 °F (37 °C)]   Pulse:  [45-88]   Resp:  [16-20]   BP: (109-157)/(55-72)   SpO2:  [95 %-99 %] .   Home meds for hypertension were reviewed and noted below.   Hypertension Medications               furosemide (LASIX) 20 MG tablet Take 1 tablet (20 mg total) by mouth once daily.    NIFEdipine (PROCARDIA-XL) 90 MG (OSM) 24 hr tablet Take 1 tablet (90 mg total) by mouth once daily.    bumetanide (BUMEX) 0.5 MG Tab Take 0.5 mg by mouth daily as needed.    bumetanide (BUMEX) 1 MG tablet Take by mouth. unknown    hydrALAZINE (APRESOLINE) 100 MG tablet Take by mouth. unknown    isosorbide dinitrate (ISORDIL) 10 MG tablet Take 1 tablet (10 mg total) by mouth 3 (three) times daily. Hold until follow up with a provider            While in the hospital, will manage blood pressure as follows; will resume meds as tolerated     Will utilize p.r.n. blood pressure medication only if patient's blood pressure greater than 160/100 and he develops symptoms such as worsening chest pain or shortness of breath.      VTE Risk Mitigation (From admission, onward)           Ordered     apixaban tablet 5 mg  2 times daily         07/01/24 2139     Reason for No Pharmacological VTE Prophylaxis  Once        Question:  Reasons:  Answer:  Risk of Bleeding    06/26/24 1453     IP VTE HIGH RISK PATIENT  Once         06/26/24 1453     Place sequential compression device  Until discontinued          06/26/24 1453                    Discharge Planning   OXANA: 7/5/2024     Code Status: Full Code   Is the patient medically ready for discharge?:     Reason for patient still in hospital (select all that apply): Patient trending condition, Laboratory test, Treatment, and Consult recommendations  Discharge Plan A: Skilled Nursing Facility, Return to nursing home                  Ervin Valdovinos PA-C  Department of Hospital Medicine   [facility]

## 2024-07-03 NOTE — PLAN OF CARE
A&O x4. Pt denies N/V, SOB, distress. Medications given per MAR. Vital signs stable throughout the night. Refused to be turned the first half of the shift. Weight shifting assistance provided. Pt had 1 BM. Sacral dressing changed. Safety precautions maintained. Bed alarm set. Call bell within reach. Patient encouraged to call for assistance.

## 2024-07-03 NOTE — SUBJECTIVE & OBJECTIVE
Interval History: No acute events. Awaiting cx. Now with GNRs    Review of Systems   Constitutional:  Positive for activity change, appetite change, fatigue and fever (Low-grade).   HENT:  Negative for trouble swallowing.    Respiratory:  Negative for cough.    Cardiovascular:  Negative for chest pain.   Gastrointestinal:  Negative for diarrhea, nausea and vomiting.   Genitourinary:  Negative for hematuria.   Musculoskeletal:  Positive for arthralgias and myalgias.   Skin:  Positive for wound.   Neurological:  Positive for weakness.   Psychiatric/Behavioral:  Negative for confusion.      Objective:     Vital Signs (Most Recent):  Temp: 98.3 °F (36.8 °C) (07/03/24 1112)  Pulse: 81 (07/03/24 1112)  Resp: 18 (07/03/24 1112)  BP: 131/70 (07/03/24 1112)  SpO2: 96 % (07/03/24 1154) Vital Signs (24h Range):  Temp:  [98.3 °F (36.8 °C)-98.6 °F (37 °C)] 98.3 °F (36.8 °C)  Pulse:  [45-88] 81  Resp:  [16-20] 18  SpO2:  [96 %-100 %] 96 %  BP: (109-157)/(55-70) 131/70     Weight: 91.6 kg (201 lb 15.1 oz)  Body mass index is 29.82 kg/m².    Estimated Creatinine Clearance: 89.3 mL/min (based on SCr of 0.8 mg/dL).     Physical Exam  Vitals and nursing note reviewed.   Constitutional:       Appearance: He is obese.   HENT:      Head: Normocephalic and atraumatic.      Mouth/Throat:      Mouth: Mucous membranes are moist.   Eyes:      Extraocular Movements: Extraocular movements intact.   Cardiovascular:      Rate and Rhythm: Normal rate.      Pulses: Normal pulses.      Heart sounds: Normal heart sounds.   Pulmonary:      Effort: Pulmonary effort is normal.      Breath sounds: Normal breath sounds.   Abdominal:      General: Bowel sounds are normal. There is no distension.      Palpations: Abdomen is soft.      Tenderness: There is no abdominal tenderness. There is no guarding.   Musculoskeletal:      Cervical back: Normal range of motion and neck supple.      Comments: Left AKA   Skin:     General: Skin is warm and dry.       Capillary Refill: Capillary refill takes 2 to 3 seconds.   Neurological:      Mental Status: He is alert and oriented to person, place, and time. Mental status is at baseline.      Motor: No weakness.   Psychiatric:         Mood and Affect: Mood normal.         Behavior: Behavior normal.          Significant Labs: All pertinent labs within the past 24 hours have been reviewed.    Significant Imaging: I have reviewed all pertinent imaging results/findings within the past 24 hours.

## 2024-07-03 NOTE — NURSING
Rapid Response Nurse Follow-up Note     Single lumen 20G, 2.25IN AccuCath placed in the left basilic vein. Needle advanced into the vessel under real time ultrasound guidance.    Max dwell date: 7/10/24    Lot number: [id number]

## 2024-07-03 NOTE — SUBJECTIVE & OBJECTIVE
Interval History: Nursing notes reviewed and no acute events overnight. VSS. Pt w/ no c/o today. H/H stable. Bone culture growing G- rods, others NGTD. Awaiting ID recs.     Review of Systems   Constitutional:  Positive for activity change, appetite change, fatigue and fever (Low-grade).   HENT:  Negative for trouble swallowing.    Respiratory:  Negative for cough.    Cardiovascular:  Negative for chest pain.   Gastrointestinal:  Negative for diarrhea, nausea and vomiting.   Genitourinary:  Negative for hematuria.   Musculoskeletal:  Positive for arthralgias and myalgias.   Skin:  Positive for wound.   Neurological:  Positive for weakness.   Psychiatric/Behavioral:  Negative for confusion.      Objective:     Vital Signs (Most Recent):  Temp: 98.3 °F (36.8 °C) (07/03/24 0734)  Pulse: 80 (07/03/24 0734)  Resp: 16 (07/03/24 0734)  BP: (!) 109/55 (07/03/24 0734)  SpO2: 99 % (07/03/24 0734) Vital Signs (24h Range):  Temp:  [98.3 °F (36.8 °C)-98.6 °F (37 °C)] 98.3 °F (36.8 °C)  Pulse:  [45-88] 80  Resp:  [16-20] 16  SpO2:  [95 %-99 %] 99 %  BP: (109-157)/(55-72) 109/55     Weight: 91.6 kg (201 lb 15.1 oz)  Body mass index is 29.82 kg/m².    Intake/Output Summary (Last 24 hours) at 7/3/2024 0932  Last data filed at 7/3/2024 0637  Gross per 24 hour   Intake 442.65 ml   Output 1725 ml   Net -1282.35 ml         Physical Exam  Vitals and nursing note reviewed.   Constitutional:       Appearance: He is obese. He is ill-appearing.   HENT:      Head: Normocephalic and atraumatic.      Mouth/Throat:      Mouth: Mucous membranes are moist.   Eyes:      Extraocular Movements: Extraocular movements intact.   Cardiovascular:      Rate and Rhythm: Normal rate.      Pulses: Normal pulses.      Heart sounds: Normal heart sounds.   Pulmonary:      Effort: Pulmonary effort is normal.      Breath sounds: Normal breath sounds.   Abdominal:      General: Bowel sounds are normal. There is no distension.      Palpations: Abdomen is soft.       Tenderness: There is no abdominal tenderness. There is no guarding.   Musculoskeletal:      Cervical back: Normal range of motion and neck supple.      Comments: Left AKA   Skin:     General: Skin is warm and dry.      Capillary Refill: Capillary refill takes 2 to 3 seconds.   Neurological:      Mental Status: He is alert and oriented to person, place, and time. Mental status is at baseline.      Motor: No weakness.   Psychiatric:         Mood and Affect: Mood normal.         Behavior: Behavior normal.             Significant Labs: All pertinent labs within the past 24 hours have been reviewed.    Significant Imaging: I have reviewed all pertinent imaging results/findings within the past 24 hours.

## 2024-07-03 NOTE — ASSESSMENT & PLAN NOTE
Chronic, uncontrolled. Latest blood pressure and vitals reviewed-     Temp:  [98.3 °F (36.8 °C)-98.6 °F (37 °C)]   Pulse:  [45-88]   Resp:  [16-20]   BP: (109-157)/(55-72)   SpO2:  [95 %-99 %] .   Home meds for hypertension were reviewed and noted below.   Hypertension Medications               furosemide (LASIX) 20 MG tablet Take 1 tablet (20 mg total) by mouth once daily.    NIFEdipine (PROCARDIA-XL) 90 MG (OSM) 24 hr tablet Take 1 tablet (90 mg total) by mouth once daily.    bumetanide (BUMEX) 0.5 MG Tab Take 0.5 mg by mouth daily as needed.    bumetanide (BUMEX) 1 MG tablet Take by mouth. unknown    hydrALAZINE (APRESOLINE) 100 MG tablet Take by mouth. unknown    isosorbide dinitrate (ISORDIL) 10 MG tablet Take 1 tablet (10 mg total) by mouth 3 (three) times daily. Hold until follow up with a provider            While in the hospital, will manage blood pressure as follows; will resume meds as tolerated     Will utilize p.r.n. blood pressure medication only if patient's blood pressure greater than 160/100 and he develops symptoms such as worsening chest pain or shortness of breath.

## 2024-07-03 NOTE — ASSESSMENT & PLAN NOTE
Patient's anemia is currently controlled. Has 1 u PRBC ordered. Etiology likely d/t Iron deficiency.   Current CBC reviewed-   Lab Results   Component Value Date    HGB 8.0 (L) 07/03/2024    HCT 27.1 (L) 07/03/2024     Monitor serial CBC and transfuse if patient becomes hemodynamically unstable, symptomatic or H/H drops below 7/21.  -attempted blood transfusion yesterday however patient developed chest transfusion was stopped.  Will order 1 pack of packed red blood cells this morning and premedicate prior.  Orders for p.r.n. Lasix to be given in between units.  PPI BID  --consulted GI.  --s/p endoscopy on 6/28:  Impression:            - Normal esophagus.                          - Erosive gastropathy with no stigmata of recent                          bleeding. Biopsied.                          - Normal examined duodenum.   Recommendation:        - [facility] for [facility] care.                          - Resume previous diet.                          - Continue present medications.                          - Await pathology results.                          - Observe patient's clinical course.                          - Perform a colonoscopy at appointment to be                          scheduled.

## 2024-07-03 NOTE — ASSESSMENT & PLAN NOTE
- Consulted ID, appreciate rec's  - IV abx ordered---vancomycin and zosyn  - Blood cultures from 6/26 with no growth to date  - Bone culture growing G- rods  -Appreciate IDs plan below:  -if he is refusing debridement or bone biopsy would treat empirically with vanco, cefepime, and po flagyl   -will need 6 weeks of therapy  -while outpatient will need weekly cbc, cmp, crp ,and vanco trough faxed to [phone] (or can be monitored at nursing [facility]

## 2024-07-03 NOTE — ASSESSMENT & PLAN NOTE
73 y/o M who has a pmh of Arthritis, Bacteremia due to Gram-negative bacteria, Diabetes mellitus, Diabetes mellitus, type 2, Hyperlipidemia, Hypertension, and left AKA who is admitted and found to have right heel osteo    -await bone cx - now with GNRs  -continue cefepime, flagyl, and vanco  -will need 6 weeks of therapy  -while outpatient will need weekly cbc, cmp, crp ,and vanco trough faxed to [phone] (or can be monitored at nursing home)

## 2024-07-03 NOTE — ASSESSMENT & PLAN NOTE
Patient with trial fibrillation which is uncontrolled currently with Calcium Channel Blocker. Patient is currently in atrial fibrillation.LLCSW2COLt Score: 4. . Anticoagulation done with Eliquis

## 2024-07-03 NOTE — PLAN OF CARE
"   07/03/24 1435   Post-Acute Status   Post-Acute Authorization Placement   Post-Acute Placement Status Pending medical clearance/testing  (Noted need for 6 wks of IV ABX upon return to NH. Auth request to be submitted once final ID recs noted. Msg left for admissions. Care[facility] referral info sent. Updated pt at bedside.)   Discharge Plan   Discharge Plan A Skilled Nursing Facility;Return to nursing home       No future appointments.    No orders of the defined types were placed in this encounter.    Consults (From admission, onward)          Status Ordering Provider     Inpatient consult to Social Work  Once        Provider:  (Not yet assigned)    Ordered DEBBI HARRINGTON     Inpatient consult to Infectious Diseases  Once        Provider:  (Not yet assigned)    Completed HALEIGH TORRES     Pharmacy to dose Vancomycin consult  Once        Provider:  (Not yet assigned)   Placed in "And" Linked Group    Acknowledged HALEIGH TORRES     Inpatient consult to Cardiology-[facility]  Once        Provider:  (Not yet assigned)    Completed TERI MELTON     Inpatient consult to Podiatry  Once        Provider:  (Not yet assigned)    Completed HALEIGH TORRES     Inpatient consult to Gastroenterology  Once        Provider:  (Not yet assigned)    Completed HALEIGH TORRES     Inpatient consult to Registered Dietitian/Nutritionist  Once        Provider:  (Not yet assigned)    Completed SHARLENE CALVILLO     Inpatient consult to Registered Dietitian/Nutritionist  Once        Provider:  (Not yet assigned)    Completed SHARLENE CALVILLO            "

## 2024-07-03 NOTE — PROGRESS NOTES
[facility]  Infectious Disease  Progress Note    Patient Name: Saji Castañeda  MRN: [id number]  Admission Date: 6/26/2024  Length of Stay: 6 days  Attending Physician: Efrain Coleman MD  Primary Care Provider: [facility] -    Isolation Status: No active isolations  Assessment/Plan:      ID  Osteomyelitis of right lower extremity  75 y/o M who has a pmh of Arthritis, Bacteremia due to Gram-negative bacteria, Diabetes mellitus, Diabetes mellitus, type 2, Hyperlipidemia, Hypertension, and left AKA who is admitted and found to have right heel osteo    -await bone cx - now with GNRs  -continue cefepime, flagyl, and vanco  -will need 6 weeks of therapy  -while outpatient will need weekly cbc, cmp, crp ,and vanco trough faxed to [phone] (or can be monitored at nursing home)          Thank you for your consult. I will follow-up with patient. Please contact us if you have any additional questions.    Jaylan Arita MD  Infectious Disease  [facility]    Subjective:     Principal Problem:Anemia    HPI: 75 y/o M who has a pmh of Arthritis, Bacteremia due to Gram-negative bacteria, Diabetes mellitus, Diabetes mellitus, type 2, Hyperlipidemia, Hypertension, Osteomyelitis, and Palliative care encounter presenting to the Emergency Department for referral. Patient was sent to the ED via EMS from [facility] for low blood count. Patient denies any chest pain, shortness on breath, weakness, fatigue, dizziness. ID is consulted for OM of right heel found on CT. On vanco and zosyn. He does not want amputation. Seen by cards and is not a revascularization candidate.   Interval History: No acute events. Awaiting cx. Now with GNRs    Review of Systems   Constitutional:  Positive for activity change, appetite change, fatigue and fever (Low-grade).   HENT:  Negative for trouble swallowing.    Respiratory:  Negative for cough.    Cardiovascular:  Negative for chest pain.   Gastrointestinal:  Negative  for diarrhea, nausea and vomiting.   Genitourinary:  Negative for hematuria.   Musculoskeletal:  Positive for arthralgias and myalgias.   Skin:  Positive for wound.   Neurological:  Positive for weakness.   Psychiatric/Behavioral:  Negative for confusion.      Objective:     Vital Signs (Most Recent):  Temp: 98.3 °F (36.8 °C) (07/03/24 1112)  Pulse: 81 (07/03/24 1112)  Resp: 18 (07/03/24 1112)  BP: 131/70 (07/03/24 1112)  SpO2: 96 % (07/03/24 1154) Vital Signs (24h Range):  Temp:  [98.3 °F (36.8 °C)-98.6 °F (37 °C)] 98.3 °F (36.8 °C)  Pulse:  [45-88] 81  Resp:  [16-20] 18  SpO2:  [96 %-100 %] 96 %  BP: (109-157)/(55-70) 131/70     Weight: 91.6 kg (201 lb 15.1 oz)  Body mass index is 29.82 kg/m².    Estimated Creatinine Clearance: 89.3 mL/min (based on SCr of 0.8 mg/dL).     Physical Exam  Vitals and nursing note reviewed.   Constitutional:       Appearance: He is obese.   HENT:      Head: Normocephalic and atraumatic.      Mouth/Throat:      Mouth: Mucous membranes are moist.   Eyes:      Extraocular Movements: Extraocular movements intact.   Cardiovascular:      Rate and Rhythm: Normal rate.      Pulses: Normal pulses.      Heart sounds: Normal heart sounds.   Pulmonary:      Effort: Pulmonary effort is normal.      Breath sounds: Normal breath sounds.   Abdominal:      General: Bowel sounds are normal. There is no distension.      Palpations: Abdomen is soft.      Tenderness: There is no abdominal tenderness. There is no guarding.   Musculoskeletal:      Cervical back: Normal range of motion and neck supple.      Comments: Left AKA   Skin:     General: Skin is warm and dry.      Capillary Refill: Capillary refill takes 2 to 3 seconds.   Neurological:      Mental Status: He is alert and oriented to person, place, and time. Mental status is at baseline.      Motor: No weakness.   Psychiatric:         Mood and Affect: Mood normal.         Behavior: Behavior normal.          Significant Labs: All pertinent labs within  the past 24 hours have been reviewed.    Significant Imaging: I have reviewed all pertinent imaging results/findings within the past 24 hours.

## 2024-07-04 LAB
ANION GAP SERPL CALC-SCNC: 8 MMOL/L (ref 8–16)
BACTERIA BLD CULT: NORMAL
BACTERIA BLD CULT: NORMAL
BASOPHILS # BLD AUTO: 0.05 K/UL (ref 0–0.2)
BASOPHILS NFR BLD: 0.5 % (ref 0–1.9)
BUN SERPL-MCNC: 9 MG/DL (ref 8–23)
CALCIUM SERPL-MCNC: 8.7 MG/DL (ref 8.7–10.5)
CHLORIDE SERPL-SCNC: 104 MMOL/L (ref 95–110)
CO2 SERPL-SCNC: 24 MMOL/L (ref 23–29)
CREAT SERPL-MCNC: 0.7 MG/DL (ref 0.5–1.4)
DIFFERENTIAL METHOD BLD: ABNORMAL
EOSINOPHIL # BLD AUTO: 0.8 K/UL (ref 0–0.5)
EOSINOPHIL NFR BLD: 8.5 % (ref 0–8)
ERYTHROCYTE [DISTWIDTH] IN BLOOD BY AUTOMATED COUNT: 18.7 % (ref 11.5–14.5)
EST. GFR  (NO RACE VARIABLE): >60 ML/MIN/1.73 M^2
GLUCOSE SERPL-MCNC: 154 MG/DL (ref 70–110)
HCT VFR BLD AUTO: 25.9 % (ref 40–54)
HGB BLD-MCNC: 7.7 G/DL (ref 14–18)
IMM GRANULOCYTES # BLD AUTO: 0.04 K/UL (ref 0–0.04)
IMM GRANULOCYTES NFR BLD AUTO: 0.4 % (ref 0–0.5)
LYMPHOCYTES # BLD AUTO: 1.8 K/UL (ref 1–4.8)
LYMPHOCYTES NFR BLD: 19.1 % (ref 18–48)
MCH RBC QN AUTO: 25.1 PG (ref 27–31)
MCHC RBC AUTO-ENTMCNC: 29.7 G/DL (ref 32–36)
MCV RBC AUTO: 84 FL (ref 82–98)
MONOCYTES # BLD AUTO: 0.6 K/UL (ref 0.3–1)
MONOCYTES NFR BLD: 6.4 % (ref 4–15)
NEUTROPHILS # BLD AUTO: 6.1 K/UL (ref 1.8–7.7)
NEUTROPHILS NFR BLD: 65.1 % (ref 38–73)
NRBC BLD-RTO: 0 /100 WBC
PLATELET # BLD AUTO: 526 K/UL (ref 150–450)
PMV BLD AUTO: 9.2 FL (ref 9.2–12.9)
POCT GLUCOSE: 170 MG/DL (ref 70–110)
POCT GLUCOSE: 173 MG/DL (ref 70–110)
POCT GLUCOSE: 224 MG/DL (ref 70–110)
POCT GLUCOSE: 226 MG/DL (ref 70–110)
POTASSIUM SERPL-SCNC: 3.9 MMOL/L (ref 3.5–5.1)
RBC # BLD AUTO: 3.07 M/UL (ref 4.6–6.2)
SODIUM SERPL-SCNC: 136 MMOL/L (ref 136–145)
WBC # BLD AUTO: 9.43 K/UL (ref 3.9–12.7)

## 2024-07-04 PROCEDURE — 25000003 PHARM REV CODE 250: Performed by: HOSPITALIST

## 2024-07-04 PROCEDURE — 25000003 PHARM REV CODE 250: Performed by: STUDENT IN AN ORGANIZED HEALTH CARE EDUCATION/TRAINING PROGRAM

## 2024-07-04 PROCEDURE — 27000207 HC ISOLATION

## 2024-07-04 PROCEDURE — 63600175 PHARM REV CODE 636 W HCPCS: Performed by: NURSE PRACTITIONER

## 2024-07-04 PROCEDURE — 63600175 PHARM REV CODE 636 W HCPCS: Performed by: STUDENT IN AN ORGANIZED HEALTH CARE EDUCATION/TRAINING PROGRAM

## 2024-07-04 PROCEDURE — 85025 COMPLETE CBC W/AUTO DIFF WBC: CPT | Performed by: NURSE PRACTITIONER

## 2024-07-04 PROCEDURE — 25000003 PHARM REV CODE 250

## 2024-07-04 PROCEDURE — 25000003 PHARM REV CODE 250: Performed by: NURSE PRACTITIONER

## 2024-07-04 PROCEDURE — 21400001 HC TELEMETRY ROOM

## 2024-07-04 PROCEDURE — C1751 CATH, INF, PER/CENT/MIDLINE: HCPCS

## 2024-07-04 PROCEDURE — 02HV33Z INSERTION OF INFUSION DEVICE INTO SUPERIOR VENA CAVA, PERCUTANEOUS APPROACH: ICD-10-PCS | Performed by: STUDENT IN AN ORGANIZED HEALTH CARE EDUCATION/TRAINING PROGRAM

## 2024-07-04 PROCEDURE — A4216 STERILE WATER/SALINE, 10 ML: HCPCS | Performed by: NURSE PRACTITIONER

## 2024-07-04 PROCEDURE — 80048 BASIC METABOLIC PNL TOTAL CA: CPT

## 2024-07-04 PROCEDURE — 63600175 PHARM REV CODE 636 W HCPCS: Performed by: HOSPITALIST

## 2024-07-04 PROCEDURE — 36415 COLL VENOUS BLD VENIPUNCTURE: CPT

## 2024-07-04 PROCEDURE — 36569 INSJ PICC 5 YR+ W/O IMAGING: CPT

## 2024-07-04 RX ADMIN — ISOSORBIDE DINITRATE 10 MG: 10 TABLET ORAL at 08:07

## 2024-07-04 RX ADMIN — ASPIRIN 81 MG: 81 TABLET, COATED ORAL at 08:07

## 2024-07-04 RX ADMIN — CEFEPIME 2 G: 2 INJECTION, POWDER, FOR SOLUTION INTRAVENOUS at 06:07

## 2024-07-04 RX ADMIN — PANTOPRAZOLE SODIUM 40 MG: 40 INJECTION, POWDER, LYOPHILIZED, FOR SOLUTION INTRAVENOUS at 08:07

## 2024-07-04 RX ADMIN — Medication 10 ML: at 08:07

## 2024-07-04 RX ADMIN — INSULIN ASPART 2 UNITS: 100 INJECTION, SOLUTION INTRAVENOUS; SUBCUTANEOUS at 04:07

## 2024-07-04 RX ADMIN — APIXABAN 5 MG: 5 TABLET, FILM COATED ORAL at 08:07

## 2024-07-04 RX ADMIN — METRONIDAZOLE 500 MG: 500 TABLET ORAL at 02:07

## 2024-07-04 RX ADMIN — HYDROCODONE BITARTRATE AND ACETAMINOPHEN 1 TABLET: 10; 325 TABLET ORAL at 02:07

## 2024-07-04 RX ADMIN — Medication 10 ML: at 02:07

## 2024-07-04 RX ADMIN — VANCOMYCIN HYDROCHLORIDE 1250 MG: 1.25 INJECTION, POWDER, LYOPHILIZED, FOR SOLUTION INTRAVENOUS at 04:07

## 2024-07-04 RX ADMIN — GABAPENTIN 300 MG: 300 CAPSULE ORAL at 08:07

## 2024-07-04 RX ADMIN — CEFEPIME 2 G: 2 INJECTION, POWDER, FOR SOLUTION INTRAVENOUS at 11:07

## 2024-07-04 RX ADMIN — HYDROCODONE BITARTRATE AND ACETAMINOPHEN 1 TABLET: 10; 325 TABLET ORAL at 08:07

## 2024-07-04 RX ADMIN — METRONIDAZOLE 500 MG: 500 TABLET ORAL at 05:07

## 2024-07-04 RX ADMIN — METRONIDAZOLE 500 MG: 500 TABLET ORAL at 08:07

## 2024-07-04 RX ADMIN — TAMSULOSIN HYDROCHLORIDE 0.4 MG: 0.4 CAPSULE ORAL at 08:07

## 2024-07-04 RX ADMIN — CEFEPIME 2 G: 2 INJECTION, POWDER, FOR SOLUTION INTRAVENOUS at 04:07

## 2024-07-04 RX ADMIN — ISOSORBIDE DINITRATE 10 MG: 10 TABLET ORAL at 02:07

## 2024-07-04 RX ADMIN — INSULIN ASPART 1 UNITS: 100 INJECTION, SOLUTION INTRAVENOUS; SUBCUTANEOUS at 08:07

## 2024-07-04 RX ADMIN — FERROUS SULFATE TAB 325 MG (65 MG ELEMENTAL FE) 1 EACH: 325 (65 FE) TAB at 08:07

## 2024-07-04 RX ADMIN — DOCUSATE SODIUM AND SENNOSIDES 1 TABLET: 8.6; 5 TABLET, FILM COATED ORAL at 08:07

## 2024-07-04 NOTE — PROCEDURES
"Saji Castañeda is a 74 y.o. male patient.    Temp: 98.4 °F (36.9 °C) (07/04/24 1532)  Pulse: 90 (07/04/24 1532)  Resp: 17 (07/04/24 1532)  BP: (!) 146/66 (07/04/24 1532)  SpO2: 97 % (07/04/24 1532)  Weight: 91.6 kg (201 lb 15.1 oz) (07/01/24 1900)  Height: 5' 9" (175.3 cm) (07/01/24 1900)    PICC  Date/Time: 7/4/2024 3:15 PM  Performed by: Nacho Villegas RN  Consent Done: Yes  Time out: Immediately prior to procedure a time out was called to verify the correct patient, procedure, equipment, support staff and site/side marked as required  Indications: med administration and vascular access  Anesthesia: local infiltration  Local anesthetic: lidocaine 1% without epinephrine  Anesthetic Total (mL): 2  Preparation: skin prepped with ChloraPrep  Skin prep agent dried: skin prep agent completely dried prior to procedure  Sterile barriers: all five maximum sterile barriers used - cap, mask, sterile gown, sterile gloves, and large sterile sheet  Hand hygiene: hand hygiene performed prior to central venous catheter insertion  Location details: left brachial  Catheter type: double lumen  Catheter size: 5 Fr  Catheter Length: 48 (4 cm external)cm    Ultrasound guidance: yes  Vessel Caliber: medium and patent, compressibility normal  Vascular Doppler: not done  Needle advanced into vessel with real time Ultrasound guidance.  Guidewire confirmed in vessel.  Sterile sheath used.  no esophageal manometryNumber of attempts: 1  Post-procedure: blood return through all ports, chlorhexidine patch and sterile dressing applied    Comments: Long hx of multiple failed PICC attempts on RUE.            Name JOSSE RN  7/4/2024    "

## 2024-07-04 NOTE — SUBJECTIVE & OBJECTIVE
Interval History: Nursing notes reviewed and no acute events overnight. VSS. Pt has no c/o. H/H remains stable. Bone cx growing G- rods and S. Aureus, pending sensitivities. ID following.    Review of Systems   Constitutional:  Positive for activity change, appetite change and fatigue. Negative for fever (Low-grade).   HENT:  Negative for trouble swallowing.    Respiratory:  Negative for cough.    Cardiovascular:  Negative for chest pain.   Gastrointestinal:  Negative for abdominal pain, diarrhea, nausea and vomiting.   Genitourinary:  Negative for hematuria.   Musculoskeletal:  Positive for arthralgias and myalgias.   Skin:  Positive for wound.   Neurological:  Positive for weakness.   Psychiatric/Behavioral:  Negative for confusion.      Objective:     Vital Signs (Most Recent):  Temp: 98.3 °F (36.8 °C) (07/04/24 0730)  Pulse: 81 (07/04/24 0730)  Resp: 17 (07/04/24 0730)  BP: 121/69 (07/04/24 0730)  SpO2: 98 % (07/04/24 0730) Vital Signs (24h Range):  Temp:  [98 °F (36.7 °C)-98.3 °F (36.8 °C)] 98.3 °F (36.8 °C)  Pulse:  [77-88] 81  Resp:  [16-18] 17  SpO2:  [95 %-100 %] 98 %  BP: (121-136)/(58-69) 121/69     Weight: 91.6 kg (201 lb 15.1 oz)  Body mass index is 29.82 kg/m².    Intake/Output Summary (Last 24 hours) at 7/4/2024 1133  Last data filed at 7/4/2024 1126  Gross per 24 hour   Intake 444.95 ml   Output 1700 ml   Net -1255.05 ml         Physical Exam  Vitals and nursing note reviewed.   Constitutional:       Appearance: He is obese.   HENT:      Head: Normocephalic and atraumatic.      Mouth/Throat:      Mouth: Mucous membranes are moist.   Eyes:      Extraocular Movements: Extraocular movements intact.   Cardiovascular:      Rate and Rhythm: Normal rate.      Pulses: Normal pulses.      Heart sounds: Normal heart sounds.   Pulmonary:      Effort: Pulmonary effort is normal.      Breath sounds: Normal breath sounds.   Abdominal:      General: Bowel sounds are normal. There is no distension.      Palpations:  Abdomen is soft.      Tenderness: There is no abdominal tenderness. There is no guarding.   Musculoskeletal:      Cervical back: Normal range of motion and neck supple.      Comments: Left AKA   Skin:     General: Skin is warm and dry.      Capillary Refill: Capillary refill takes 2 to 3 seconds.   Neurological:      Mental Status: He is alert and oriented to person, place, and time. Mental status is at baseline.      Motor: No weakness.   Psychiatric:         Mood and Affect: Mood normal.         Behavior: Behavior normal.             Significant Labs: All pertinent labs within the past 24 hours have been reviewed.    Significant Imaging: I have reviewed all pertinent imaging results/findings within the past 24 hours.

## 2024-07-04 NOTE — PROGRESS NOTES
[facility] Medicine  Progress Note    Patient Name: Saji Castañeda  MRN: [id number]  Patient Class: IP- Inpatient   Admission Date: 6/26/2024  Length of Stay: 7 days  Attending Physician: Efrain Coleman MD  Primary Care Provider: No primary care provider on file.        Subjective:     Principal Problem:Anemia        HPI:  Saji Castañeda is a 73 y/o M who has a pmh of Arthritis, Bacteremia due to Gram-negative bacteria, Diabetes mellitus, Diabetes mellitus, type 2, Hyperlipidemia, Hypertension, Osteomyelitis, and Palliative care encounter. presenting to the Emergency Department for referral. Patient was sent to the ED via EMS from [facility] for low blood count. Patient denies any chest pain, shortness on breath, weakness, fatigue, dizziness. His hemoglobin was 6.3--will order 1 u PRBC ordered. FOBT pending. Will admit to the [facility] service for further care.    Overview/Hospital Course:  He was admitted to the hospital medicine service for further care.  Patient noted with anemia as well as osteomyelitis on admission.  Patient underwent a EGD on 06/28 which noted Erosive gastropathy.  Anticoagulation is on hold until cleared by GI.  He did receive 1 unit of packed red blood cells on admission.  CT of his right foot noted with osteomyelitis.  Appreciate Podiatry is evaluation---status post bone biopsy on today---also appreciate infectious disease.    Interval History: Nursing notes reviewed and no acute events overnight. VSS. Pt has no c/o. H/H remains stable. Bone cx growing G- rods and S. Aureus, pending sensitivities. ID following.    Review of Systems   Constitutional:  Positive for activity change, appetite change and fatigue. Negative for fever (Low-grade).   HENT:  Negative for trouble swallowing.    Respiratory:  Negative for cough.    Cardiovascular:  Negative for chest pain.   Gastrointestinal:  Negative for abdominal pain, diarrhea, nausea and vomiting.   Genitourinary:   Negative for hematuria.   Musculoskeletal:  Positive for arthralgias and myalgias.   Skin:  Positive for wound.   Neurological:  Positive for weakness.   Psychiatric/Behavioral:  Negative for confusion.      Objective:     Vital Signs (Most Recent):  Temp: 98.3 °F (36.8 °C) (07/04/24 0730)  Pulse: 81 (07/04/24 0730)  Resp: 17 (07/04/24 0730)  BP: 121/69 (07/04/24 0730)  SpO2: 98 % (07/04/24 0730) Vital Signs (24h Range):  Temp:  [98 °F (36.7 °C)-98.3 °F (36.8 °C)] 98.3 °F (36.8 °C)  Pulse:  [77-88] 81  Resp:  [16-18] 17  SpO2:  [95 %-100 %] 98 %  BP: (121-136)/(58-69) 121/69     Weight: 91.6 kg (201 lb 15.1 oz)  Body mass index is 29.82 kg/m².    Intake/Output Summary (Last 24 hours) at 7/4/2024 1133  Last data filed at 7/4/2024 1126  Gross per 24 hour   Intake 444.95 ml   Output 1700 ml   Net -1255.05 ml         Physical Exam  Vitals and nursing note reviewed.   Constitutional:       Appearance: He is obese.   HENT:      Head: Normocephalic and atraumatic.      Mouth/Throat:      Mouth: Mucous membranes are moist.   Eyes:      Extraocular Movements: Extraocular movements intact.   Cardiovascular:      Rate and Rhythm: Normal rate.      Pulses: Normal pulses.      Heart sounds: Normal heart sounds.   Pulmonary:      Effort: Pulmonary effort is normal.      Breath sounds: Normal breath sounds.   Abdominal:      General: Bowel sounds are normal. There is no distension.      Palpations: Abdomen is soft.      Tenderness: There is no abdominal tenderness. There is no guarding.   Musculoskeletal:      Cervical back: Normal range of motion and neck supple.      Comments: Left AKA   Skin:     General: Skin is warm and dry.      Capillary Refill: Capillary refill takes 2 to 3 seconds.   Neurological:      Mental Status: He is alert and oriented to person, place, and time. Mental status is at baseline.      Motor: No weakness.   Psychiatric:         Mood and Affect: Mood normal.         Behavior: Behavior normal.              Significant Labs: All pertinent labs within the past 24 hours have been reviewed.    Significant Imaging: I have reviewed all pertinent imaging results/findings within the past 24 hours.    Assessment/Plan:      * Anemia  Patient's anemia is currently controlled. Has 1 u PRBC ordered. Etiology likely d/t Iron deficiency.   Current CBC reviewed-   Lab Results   Component Value Date    HGB 7.7 (L) 07/04/2024    HCT 25.9 (L) 07/04/2024     Monitor serial CBC and transfuse if patient becomes hemodynamically unstable, symptomatic or H/H drops below 7/21.  -attempted blood transfusion yesterday however patient developed chest transfusion was stopped.  Will order 1 pack of packed red blood cells this morning and premedicate prior.  Orders for p.r.n. Lasix to be given in between units.  PPI BID  --consulted GI.  --s/p endoscopy on 6/28:  Impression:            - Normal esophagus.                          - Erosive gastropathy with no stigmata of recent                          bleeding. Biopsied.                          - Normal examined duodenum.   Recommendation:        - Return patient to [facility] for ongoing care.                          - Resume previous diet.                          - Continue present medications.                          - Await pathology results.                          - Observe patient's clinical course.                          - Perform a colonoscopy at appointment to be                          scheduled.     Pressure injury of buttock, stage 4        Pressure injury of right heel, stage 4        Pressure injury of ankle, unstageable-lateral  Pressure injury of buttock, stage 4   Pressure injury of right heel, stage 4     - Appreciate IP wound care  - Pressure injury prevention interventions - waffle overlay and heel boot  - Vashe wet-to-dry to bilateral buttocks, R buttock, and R heel wound BID  - Mepilex border to L posterior thigh/gluteal fold 3x/week  - Podiatry consulted for R  heel wound   --also noted with a bilateral buttock full thickness pressure injury  --Right heel and sacrum CT ordered to r/o OM  --appreciate cardiology's evaluation      Wounds, multiple  -patient with wounds to his sacrum and heel  -wound care consulted  -Ulcer care ordered      Chronic anticoagulation  Continue home OAC      Stage 3b chronic kidney disease  Creatine stable for now. BMP reviewed- noted Estimated Creatinine Clearance: 102 mL/min (based on SCr of 0.7 mg/dL). according to latest data. Based on current GFR, CKD stage is stage 2 - GFR 60-89.  Monitor UOP and serial BMP and adjust therapy as needed. Renally dose meds. Avoid nephrotoxic medications and procedures.    Paroxysmal atrial fibrillation  Patient with trial fibrillation which is uncontrolled currently with Calcium Channel Blocker. Patient is currently in atrial fibrillation.[id number] Score: 4. . Anticoagulation done with Eliquis    Osteomyelitis of right lower extremity  - Consulted ID, appreciate rec's  - IV abx ordered---vancomycin and zosyn  - Blood cultures from 6/26 with no growth to date  - Bone culture growing G- rods and S. Aureus, pending sensitivities  -Appreciate IDs plan below:  -if he is refusing debridement or bone biopsy would treat empirically with vanco, cefepime, and po flagyl   -will need 6 weeks of therapy  -while outpatient will need weekly cbc, cmp, crp ,and vanco trough faxed to [phone] (or can be monitored at nursing [facility]        Chronic deep vein thrombosis (DVT) of right upper extremity  -home AC resumed  -follow      PVD (peripheral vascular disease)        Peripheral arterial disease  PVD (peripheral vascular disease)    -Restarted OAC and ASA      Type 2 diabetes mellitus, with long-term current use of insulin    Patient's FSGs are uncontrolled due to hyperglycemia on current medication regimen.  Last A1c reviewed-   Lab Results   Component Value Date    HGBA1C 6.8 (H) 06/26/2024     Most recent fingerstick  glucose reviewed-   Recent Labs   Lab 07/02/24 2055 07/03/24  0601 07/03/24  1112 07/03/24  1556   POCTGLUCOSE 224* 170* 161* 188*       Current correctional scale  Low  Maintain anti-hyperglycemic dose as follows-   Antihyperglycemics (From admission, onward)      Start     Stop Route Frequency Ordered    06/26/24 1552  insulin aspart U-100 pen 0-5 Units         -- SubQ Before meals & nightly PRN 06/26/24 1453          Hold Oral hypoglycemics while patient is in the hospital.      Essential hypertension  Chronic, uncontrolled. Latest blood pressure and vitals reviewed-     Temp:  [98.3 °F (36.8 °C)-98.6 °F (37 °C)]   Pulse:  [45-88]   Resp:  [16-20]   BP: (109-157)/(55-72)   SpO2:  [95 %-99 %] .   Home meds for hypertension were reviewed and noted below.   Hypertension Medications               furosemide (LASIX) 20 MG tablet Take 1 tablet (20 mg total) by mouth once daily.    NIFEdipine (PROCARDIA-XL) 90 MG (OSM) 24 hr tablet Take 1 tablet (90 mg total) by mouth once daily.    bumetanide (BUMEX) 0.5 MG Tab Take 0.5 mg by mouth daily as needed.    bumetanide (BUMEX) 1 MG tablet Take by mouth. unknown    hydrALAZINE (APRESOLINE) 100 MG tablet Take by mouth. unknown    isosorbide dinitrate (ISORDIL) 10 MG tablet Take 1 tablet (10 mg total) by mouth 3 (three) times daily. Hold until follow up with a provider            While in the hospital, will manage blood pressure as follows; will resume meds as tolerated     Will utilize p.r.n. blood pressure medication only if patient's blood pressure greater than 160/100 and he develops symptoms such as worsening chest pain or shortness of breath.      VTE Risk Mitigation (From admission, onward)           Ordered     apixaban tablet 5 mg  2 times daily         07/01/24 2139     Reason for No Pharmacological VTE Prophylaxis  Once        Question:  Reasons:  Answer:  Risk of Bleeding    06/26/24 1453     IP VTE HIGH RISK PATIENT  Once         06/26/24 5710     Place sequential  compression device  Until discontinued         06/26/24 1453                    Discharge Planning   OXANA: 7/5/2024     Code Status: Full Code   Is the patient medically ready for discharge?:     Reason for patient still in hospital (select all that apply): Patient trending condition, Laboratory test, Treatment, and Consult recommendations  Discharge Plan A: Skilled Nursing Facility, Return to nursing home                  Ervin Valdovinos PA-C  Department of Hospital Medicine   [facility]

## 2024-07-04 NOTE — ASSESSMENT & PLAN NOTE
Patient's anemia is currently controlled. Has 1 u PRBC ordered. Etiology likely d/t Iron deficiency.   Current CBC reviewed-   Lab Results   Component Value Date    HGB 7.7 (L) 07/04/2024    HCT 25.9 (L) 07/04/2024     Monitor serial CBC and transfuse if patient becomes hemodynamically unstable, symptomatic or H/H drops below 7/21.  -attempted blood transfusion yesterday however patient developed chest transfusion was stopped.  Will order 1 pack of packed red blood cells this morning and premedicate prior.  Orders for p.r.n. Lasix to be given in between units.  PPI BID  --consulted GI.  --s/p endoscopy on 6/28:  Impression:            - Normal esophagus.                          - Erosive gastropathy with no stigmata of recent                          bleeding. Biopsied.                          - Normal examined duodenum.   Recommendation:        - [facility].                          - Resume previous diet.                          - Continue present medications.                          - Await pathology results.                          - Observe patient's clinical course.                          - Perform a colonoscopy at appointment to be                          scheduled.

## 2024-07-04 NOTE — ASSESSMENT & PLAN NOTE
Creatine stable for now. BMP reviewed- noted Estimated Creatinine Clearance: 102 mL/min (based on SCr of 0.7 mg/dL). according to latest data. Based on current GFR, CKD stage is stage 2 - GFR 60-89.  Monitor UOP and serial BMP and adjust therapy as needed. Renally dose meds. Avoid nephrotoxic medications and procedures.

## 2024-07-04 NOTE — PLAN OF CARE
A&O x4. Flat affect and agitated, refusing care. Pt denies N/V, SOB, distress. Medications given per MAR. Vital signs stable throughout the night. Refused to be turned most of the shift and refused wound care. Weight shifting assistance provided when patient allowed. Safety precautions maintained. Bed alarm set. Call bell within reach. Patient encouraged to call for assistance.

## 2024-07-04 NOTE — ASSESSMENT & PLAN NOTE
- Consulted ID, appreciate rec's  - IV abx ordered---vancomycin and zosyn  - Blood cultures from 6/26 with no growth to date  - Bone culture growing G- rods and S. Aureus, pending sensitivities  -Appreciate IDs plan below:  -if he is refusing debridement or bone biopsy would treat empirically with vanco, cefepime, and po flagyl   -will need 6 weeks of therapy  -while outpatient will need weekly cbc, cmp, crp ,and vanco trough faxed to [phone] (or can be monitored at nursing [facility]

## 2024-07-05 LAB
ABO + RH BLD: NORMAL
ALBUMIN SERPL BCP-MCNC: 1.3 G/DL (ref 3.5–5.2)
ALP SERPL-CCNC: 54 U/L (ref 55–135)
ALT SERPL W/O P-5'-P-CCNC: 5 U/L (ref 10–44)
ANION GAP SERPL CALC-SCNC: 5 MMOL/L (ref 8–16)
AST SERPL-CCNC: 8 U/L (ref 10–40)
BACTERIA SPEC ANAEROBE CULT: NORMAL
BASOPHILS # BLD AUTO: 0.03 K/UL (ref 0–0.2)
BASOPHILS NFR BLD: 0.4 % (ref 0–1.9)
BILIRUB SERPL-MCNC: 0.2 MG/DL (ref 0.1–1)
BLD GP AB SCN CELLS X3 SERPL QL: NORMAL
BLD PROD TYP BPU: NORMAL
BLOOD UNIT EXPIRATION DATE: NORMAL
BLOOD UNIT TYPE CODE: 5100
BLOOD UNIT TYPE: NORMAL
BUN SERPL-MCNC: 9 MG/DL (ref 8–23)
CALCIUM SERPL-MCNC: 8.2 MG/DL (ref 8.7–10.5)
CHLORIDE SERPL-SCNC: 101 MMOL/L (ref 95–110)
CO2 SERPL-SCNC: 32 MMOL/L (ref 23–29)
CODING SYSTEM: NORMAL
CREAT SERPL-MCNC: 0.8 MG/DL (ref 0.5–1.4)
CROSSMATCH INTERPRETATION: NORMAL
DIFFERENTIAL METHOD BLD: ABNORMAL
DISPENSE STATUS: NORMAL
EOSINOPHIL # BLD AUTO: 0.8 K/UL (ref 0–0.5)
EOSINOPHIL NFR BLD: 9.1 % (ref 0–8)
ERYTHROCYTE [DISTWIDTH] IN BLOOD BY AUTOMATED COUNT: 18.7 % (ref 11.5–14.5)
EST. GFR  (NO RACE VARIABLE): >60 ML/MIN/1.73 M^2
GLUCOSE SERPL-MCNC: 167 MG/DL (ref 70–110)
HCT VFR BLD AUTO: 22.1 % (ref 40–54)
HGB BLD-MCNC: 6.8 G/DL (ref 14–18)
IMM GRANULOCYTES # BLD AUTO: 0.05 K/UL (ref 0–0.04)
IMM GRANULOCYTES NFR BLD AUTO: 0.6 % (ref 0–0.5)
LYMPHOCYTES # BLD AUTO: 1.6 K/UL (ref 1–4.8)
LYMPHOCYTES NFR BLD: 19 % (ref 18–48)
MCH RBC QN AUTO: 25.6 PG (ref 27–31)
MCHC RBC AUTO-ENTMCNC: 30.8 G/DL (ref 32–36)
MCV RBC AUTO: 83 FL (ref 82–98)
MONOCYTES # BLD AUTO: 0.6 K/UL (ref 0.3–1)
MONOCYTES NFR BLD: 7.5 % (ref 4–15)
NEUTROPHILS # BLD AUTO: 5.2 K/UL (ref 1.8–7.7)
NEUTROPHILS NFR BLD: 63.4 % (ref 38–73)
NRBC BLD-RTO: 0 /100 WBC
NUM UNITS TRANS PACKED RBC: NORMAL
PLATELET # BLD AUTO: 516 K/UL (ref 150–450)
PMV BLD AUTO: 8.8 FL (ref 9.2–12.9)
POCT GLUCOSE: 193 MG/DL (ref 70–110)
POCT GLUCOSE: 202 MG/DL (ref 70–110)
POCT GLUCOSE: 229 MG/DL (ref 70–110)
POCT GLUCOSE: 229 MG/DL (ref 70–110)
POTASSIUM SERPL-SCNC: 3.4 MMOL/L (ref 3.5–5.1)
PROT SERPL-MCNC: 5.6 G/DL (ref 6–8.4)
RBC # BLD AUTO: 2.66 M/UL (ref 4.6–6.2)
SODIUM SERPL-SCNC: 138 MMOL/L (ref 136–145)
SPECIMEN OUTDATE: NORMAL
VANCOMYCIN TROUGH SERPL-MCNC: 15.2 UG/ML (ref 10–22)
WBC # BLD AUTO: 8.22 K/UL (ref 3.9–12.7)

## 2024-07-05 PROCEDURE — 99900035 HC TECH TIME PER 15 MIN (STAT)

## 2024-07-05 PROCEDURE — 85025 COMPLETE CBC W/AUTO DIFF WBC: CPT | Performed by: NURSE PRACTITIONER

## 2024-07-05 PROCEDURE — 63600175 PHARM REV CODE 636 W HCPCS: Performed by: HOSPITALIST

## 2024-07-05 PROCEDURE — 25000003 PHARM REV CODE 250: Performed by: STUDENT IN AN ORGANIZED HEALTH CARE EDUCATION/TRAINING PROGRAM

## 2024-07-05 PROCEDURE — 25000003 PHARM REV CODE 250

## 2024-07-05 PROCEDURE — 80202 ASSAY OF VANCOMYCIN: CPT | Performed by: HOSPITALIST

## 2024-07-05 PROCEDURE — 63600175 PHARM REV CODE 636 W HCPCS: Performed by: STUDENT IN AN ORGANIZED HEALTH CARE EDUCATION/TRAINING PROGRAM

## 2024-07-05 PROCEDURE — 21400001 HC TELEMETRY ROOM

## 2024-07-05 PROCEDURE — 27000207 HC ISOLATION

## 2024-07-05 PROCEDURE — 94761 N-INVAS EAR/PLS OXIMETRY MLT: CPT

## 2024-07-05 PROCEDURE — 86920 COMPATIBILITY TEST SPIN: CPT

## 2024-07-05 PROCEDURE — 86901 BLOOD TYPING SEROLOGIC RH(D): CPT

## 2024-07-05 PROCEDURE — 63600175 PHARM REV CODE 636 W HCPCS: Performed by: NURSE PRACTITIONER

## 2024-07-05 PROCEDURE — A4216 STERILE WATER/SALINE, 10 ML: HCPCS | Performed by: NURSE PRACTITIONER

## 2024-07-05 PROCEDURE — 25000003 PHARM REV CODE 250: Performed by: HOSPITALIST

## 2024-07-05 PROCEDURE — 80053 COMPREHEN METABOLIC PANEL: CPT

## 2024-07-05 PROCEDURE — P9016 RBC LEUKOCYTES REDUCED: HCPCS

## 2024-07-05 PROCEDURE — 25000003 PHARM REV CODE 250: Performed by: NURSE PRACTITIONER

## 2024-07-05 PROCEDURE — 99232 SBSQ HOSP IP/OBS MODERATE 35: CPT | Mod: ,,, | Performed by: INTERNAL MEDICINE

## 2024-07-05 PROCEDURE — 0QBL0ZZ EXCISION OF RIGHT TARSAL, OPEN APPROACH: ICD-10-PCS | Performed by: STUDENT IN AN ORGANIZED HEALTH CARE EDUCATION/TRAINING PROGRAM

## 2024-07-05 PROCEDURE — 63600175 PHARM REV CODE 636 W HCPCS: Mod: JZ,JG

## 2024-07-05 RX ORDER — POTASSIUM CHLORIDE 20 MEQ/1
40 TABLET, EXTENDED RELEASE ORAL ONCE
Status: COMPLETED | OUTPATIENT
Start: 2024-07-05 | End: 2024-07-05

## 2024-07-05 RX ORDER — HYDROCODONE BITARTRATE AND ACETAMINOPHEN 500; 5 MG/1; MG/1
TABLET ORAL
Status: DISCONTINUED | OUTPATIENT
Start: 2024-07-05 | End: 2024-07-11 | Stop reason: HOSPADM

## 2024-07-05 RX ORDER — ISOSORBIDE DINITRATE 10 MG/1
10 TABLET ORAL 3 TIMES DAILY
Status: CANCELLED
Start: 2024-07-05 | End: 2025-07-05

## 2024-07-05 RX ADMIN — ISOSORBIDE DINITRATE 10 MG: 10 TABLET ORAL at 08:07

## 2024-07-05 RX ADMIN — CEFTAZIDIME, AVIBACTAM 2.5 G: 2; .5 POWDER, FOR SOLUTION INTRAVENOUS at 09:07

## 2024-07-05 RX ADMIN — METRONIDAZOLE 500 MG: 500 TABLET ORAL at 05:07

## 2024-07-05 RX ADMIN — ASPIRIN 81 MG: 81 TABLET, COATED ORAL at 08:07

## 2024-07-05 RX ADMIN — INSULIN ASPART 2 UNITS: 100 INJECTION, SOLUTION INTRAVENOUS; SUBCUTANEOUS at 05:07

## 2024-07-05 RX ADMIN — Medication 10 ML: at 02:07

## 2024-07-05 RX ADMIN — VANCOMYCIN HYDROCHLORIDE 1250 MG: 1.25 INJECTION, POWDER, LYOPHILIZED, FOR SOLUTION INTRAVENOUS at 05:07

## 2024-07-05 RX ADMIN — ISOSORBIDE DINITRATE 10 MG: 10 TABLET ORAL at 02:07

## 2024-07-05 RX ADMIN — PANTOPRAZOLE SODIUM 40 MG: 40 INJECTION, POWDER, LYOPHILIZED, FOR SOLUTION INTRAVENOUS at 09:07

## 2024-07-05 RX ADMIN — MORPHINE SULFATE 1 MG: 2 INJECTION, SOLUTION INTRAMUSCULAR; INTRAVENOUS at 05:07

## 2024-07-05 RX ADMIN — POTASSIUM CHLORIDE 40 MEQ: 1500 TABLET, EXTENDED RELEASE ORAL at 02:07

## 2024-07-05 RX ADMIN — Medication 10 ML: at 09:07

## 2024-07-05 RX ADMIN — FERROUS SULFATE TAB 325 MG (65 MG ELEMENTAL FE) 1 EACH: 325 (65 FE) TAB at 08:07

## 2024-07-05 RX ADMIN — GABAPENTIN 300 MG: 300 CAPSULE ORAL at 09:07

## 2024-07-05 RX ADMIN — APIXABAN 5 MG: 5 TABLET, FILM COATED ORAL at 09:07

## 2024-07-05 RX ADMIN — PANTOPRAZOLE SODIUM 40 MG: 40 INJECTION, POWDER, LYOPHILIZED, FOR SOLUTION INTRAVENOUS at 08:07

## 2024-07-05 RX ADMIN — DOCUSATE SODIUM AND SENNOSIDES 1 TABLET: 8.6; 5 TABLET, FILM COATED ORAL at 08:07

## 2024-07-05 RX ADMIN — TAMSULOSIN HYDROCHLORIDE 0.4 MG: 0.4 CAPSULE ORAL at 08:07

## 2024-07-05 RX ADMIN — ISOSORBIDE DINITRATE 10 MG: 10 TABLET ORAL at 09:07

## 2024-07-05 RX ADMIN — Medication 10 ML: at 05:07

## 2024-07-05 RX ADMIN — CEFEPIME 2 G: 2 INJECTION, POWDER, FOR SOLUTION INTRAVENOUS at 07:07

## 2024-07-05 RX ADMIN — CEFTAZIDIME, AVIBACTAM 2.5 G: 2; .5 POWDER, FOR SOLUTION INTRAVENOUS at 02:07

## 2024-07-05 RX ADMIN — HYDROCODONE BITARTRATE AND ACETAMINOPHEN 1 TABLET: 10; 325 TABLET ORAL at 09:07

## 2024-07-05 RX ADMIN — INSULIN ASPART 2 UNITS: 100 INJECTION, SOLUTION INTRAVENOUS; SUBCUTANEOUS at 04:07

## 2024-07-05 RX ADMIN — GABAPENTIN 300 MG: 300 CAPSULE ORAL at 08:07

## 2024-07-05 RX ADMIN — INSULIN ASPART 1 UNITS: 100 INJECTION, SOLUTION INTRAVENOUS; SUBCUTANEOUS at 09:07

## 2024-07-05 RX ADMIN — APIXABAN 5 MG: 5 TABLET, FILM COATED ORAL at 08:07

## 2024-07-05 NOTE — SUBJECTIVE & OBJECTIVE
Interval History: Nursing notes reviewed and no acute events overnight. VSS. Pt w/ no c/o. Hg 6.8 today and 1U pRBC transfused. Cultures show MRSA and Pseudomonas. Avycaz initiated, stopped cefepime and Flagyl, continue Vanc. I&D of right heel done by Podiatry today. Waiting for LTAC placement.    Review of Systems   Constitutional:  Positive for activity change, appetite change, fatigue and fever (Low-grade).   HENT:  Negative for trouble swallowing.    Respiratory:  Negative for cough.    Cardiovascular:  Negative for chest pain.   Gastrointestinal:  Negative for diarrhea, nausea and vomiting.   Genitourinary:  Negative for hematuria.   Musculoskeletal:  Positive for arthralgias and myalgias.   Skin:  Positive for wound.   Neurological:  Positive for weakness.   Psychiatric/Behavioral:  Negative for confusion.      Objective:     Vital Signs (Most Recent):  Temp: 98.5 °F (36.9 °C) (07/05/24 1507)  Pulse: 79 (07/05/24 1507)  Resp: 12 (07/05/24 1507)  BP: (!) 147/66 (07/05/24 1507)  SpO2: 100 % (07/05/24 1507) Vital Signs (24h Range):  Temp:  [97.5 °F (36.4 °C)-99.2 °F (37.3 °C)] 98.5 °F (36.9 °C)  Pulse:  [77-91] 79  Resp:  [12-24] 12  SpO2:  [95 %-100 %] 100 %  BP: (127-154)/(59-80) 147/66     Weight: 86.8 kg (191 lb 5.8 oz)  Body mass index is 28.26 kg/m².    Intake/Output Summary (Last 24 hours) at 7/5/2024 1558  Last data filed at 7/5/2024 1200  Gross per 24 hour   Intake 826.07 ml   Output 750 ml   Net 76.07 ml         Physical Exam  Vitals and nursing note reviewed.   Constitutional:       Appearance: He is obese.   HENT:      Head: Normocephalic and atraumatic.      Mouth/Throat:      Mouth: Mucous membranes are moist.   Eyes:      Extraocular Movements: Extraocular movements intact.   Cardiovascular:      Rate and Rhythm: Normal rate.      Pulses: Normal pulses.      Heart sounds: Normal heart sounds.   Pulmonary:      Effort: Pulmonary effort is normal.      Breath sounds: Normal breath sounds.    Abdominal:      General: Bowel sounds are normal. There is no distension.      Palpations: Abdomen is soft.      Tenderness: There is no abdominal tenderness. There is no guarding.   Musculoskeletal:      Cervical back: Normal range of motion and neck supple.      Comments: Left AKA   Skin:     General: Skin is warm and dry.      Capillary Refill: Capillary refill takes 2 to 3 seconds.   Neurological:      Mental Status: He is alert and oriented to person, place, and time. Mental status is at baseline.      Motor: No weakness.   Psychiatric:         Mood and Affect: Mood normal.         Behavior: Behavior normal.             Significant Labs: All pertinent labs within the past 24 hours have been reviewed.    Significant Imaging: I have reviewed all pertinent imaging results/findings within the past 24 hours.

## 2024-07-05 NOTE — ASSESSMENT & PLAN NOTE
- Consulted ID, appreciate rec's  - IV abx ordered---vancomycin and Avycaz. Discontinue Cefepime and Flagyl  - Blood cultures from 6/26 with no growth to date  - Bone culture grew Pseudomonas and MRSA  -Appreciate IDs plan below:  -will need 6 weeks of therapy  -while outpatient will need weekly cbc, cmp, crp ,and vanco trough faxed to [phone] (or can be monitored at nursing home  - Appreciate Podiatry assistance with I&D today

## 2024-07-05 NOTE — PROGRESS NOTES
[facility]  Podiatry  Progress Note    Patient Name: Saji Castañeda  MRN: [id number]  Admission Date: 6/26/2024  Hospital Length of Stay: 8 days  Attending Physician: Efrain Coleman MD  Primary Care Provider: No primary care provider on file.     Subjective:     Interval History:  Patient seen and evaluated bedside by Podiatry; no adverse events overnight.  Denies symptoms of fevers, nausea.  Final recommendations given for discharge.      Scheduled Meds:   apixaban  5 mg Oral BID    aspirin  81 mg Oral Daily    ceFEPime IV (PEDS and ADULTS)  2 g Intravenous Q8H    ferrous sulfate  1 tablet Oral Daily    gabapentin  300 mg Oral BID    isosorbide dinitrate  10 mg Oral TID    LIDOcaine (PF) 10 mg/ml (1%)  1 mL Other Once    metroNIDAZOLE  500 mg Oral Q8H    pantoprazole  40 mg Intravenous BID    senna-docusate 8.6-50 mg  1 tablet Oral BID    sodium chloride 0.9%  10 mL Intravenous Q8H    tamsulosin  0.4 mg Oral Daily    vancomycin (VANCOCIN) IV (PEDS and ADULTS)  1,250 mg Intravenous Q24H     Continuous Infusions:  PRN Meds:  Current Facility-Administered Medications:     0.9%  NaCl infusion (for blood administration), , Intravenous, Q24H PRN    0.9%  NaCl infusion (for blood administration), , Intravenous, Q24H PRN    acetaminophen, 650 mg, Oral, Q4H PRN    albuterol-ipratropium, 3 mL, Nebulization, Q4H PRN    aluminum-magnesium hydroxide-simethicone, 30 mL, Oral, QID PRN    dextrose 10%, 12.5 g, Intravenous, PRN    dextrose 10%, 25 g, Intravenous, PRN    diphenhydrAMINE, 25 mg, Oral, Q6H PRN    furosemide (LASIX) injection, 20 mg, Intravenous, PRN    glucagon (human recombinant), 1 mg, Intramuscular, PRN    glucose, 16 g, Oral, PRN    glucose, 24 g, Oral, PRN    HYDROcodone-acetaminophen, 1 tablet, Oral, Q6H PRN    insulin aspart U-100, 0-5 Units, Subcutaneous, QID (AC + HS) PRN    melatonin, 6 mg, Oral, Nightly PRN    morphine, 1 mg, Intravenous, Q6H PRN    ondansetron, 8 mg, Oral, Q8H PRN    ondansetron, 4  mg, Intravenous, Q8H PRN    simethicone, 1 tablet, Oral, QID PRN    Pharmacy to dose Vancomycin consult, , , Once **AND** vancomycin - pharmacy to dose, , Intravenous, pharmacy to manage frequency    Review of Systems   Constitutional:  Negative for chills and fever.   Respiratory:  Negative for cough and shortness of breath.    Cardiovascular:  Negative for chest pain.   Gastrointestinal:  Negative for constipation, diarrhea, nausea and vomiting.   Skin:  Positive for wound.     Objective:     Vital Signs (Most Recent):  Temp: 98.7 °F (37.1 °C) (07/05/24 0745)  Pulse: 77 (07/05/24 0807)  Resp: (!) 24 (07/05/24 0745)  BP: (!) 150/67 (07/05/24 0745)  SpO2: 95 % (07/05/24 0807) Vital Signs (24h Range):  Temp:  [98.4 °F (36.9 °C)-99.2 °F (37.3 °C)] 98.7 °F (37.1 °C)  Pulse:  [77-91] 77  Resp:  [17-24] 24  SpO2:  [95 %-98 %] 95 %  BP: (127-150)/(59-80) 150/67     Weight: 86.8 kg (191 lb 5.8 oz)  Body mass index is 28.26 kg/m².    Foot Exam    General  General Appearance: appears stated age and healthy   Orientation: alert and oriented to person, place, and time   Affect: appropriate       Right Foot/Ankle     Inspection and Palpation  Ecchymosis: none  Tenderness: none   Swelling: none   Skin Exam: skin intact;     Neurovascular  Dorsalis pedis: 1+  Posterior tibial: absent  Saphenous nerve sensation: diminished  Tibial nerve sensation: diminished  Superficial peroneal nerve sensation: diminished  Deep peroneal nerve sensation: diminished  Sural nerve sensation: diminished    Comments  Right heel present with pressure ulceration to the depth of bone; periwound maceration noted with black eschar. Fibrogranular wound bed.  No malodor noted.      Post debridement, granular wound bed with healthy bleeding margins, proximally 5.0 x 4 5 x 0.5 cm with sanguinous drainage.           Left Foot/Ankle      Inspection and Palpation  Skin Exam: skin intact;     Comments  Left AKA.      Laboratory:  BMP:   Recent Labs   Lab  07/02/24  0522 07/04/24  0520   * 154*    136   K 3.5 3.9    104   CO2 32* 24   BUN 7* 9   CREATININE 0.8 0.7   CALCIUM 8.0* 8.7   MG 1.8  --      CBC:   Recent Labs   Lab 07/05/24  0843   WBC 8.22   RBC 2.66*   HGB 6.8*   HCT 22.1*   *   MCV 83   MCH 25.6*   MCHC 30.8*     Microbiology Results (last 7 days)       Procedure Component Value Units Date/Time    Aerobic culture [[id number]]  (Abnormal)  (Susceptibility) Collected: 07/01/24 1241    Order Status: Completed Specimen: Bone from Foot, Right Updated: 07/05/24 1037     Aerobic Bacterial Culture METHICILLIN RESISTANT STAPHYLOCOCCUS AUREUS  Few        PSEUDOMONAS AERUGINOSA   Few  Identification and susceptibility pending      Narrative:      Right calcaneus bone    Blood culture [[id number]] Collected: 06/28/24 2252    Order Status: Completed Specimen: Blood from Peripheral, Hand, Right Updated: 07/04/24 1012     Blood Culture, Routine No growth after 5 days.    Narrative:      Collection has been rescheduled by [email or web address] at 06/28/2024 12:19 Reason:   Unable to collect .  Collection has been rescheduled by KML at 06/28/2024 16:08 Reason:   Unable to collect notified nurse Ludmila  Collection has been rescheduled by [email or web address] at 06/28/2024 12:19 Reason:   Unable to collect .  Collection has been rescheduled by KML at 06/28/2024 16:08 Reason:   Unable to collect notified nurse Ludmila    Blood culture [[id number]] Collected: 06/28/24 2251    Order Status: Completed Specimen: Blood from Peripheral, Hand, Right Updated: 07/04/24 1012     Blood Culture, Routine No growth after 5 days.    Narrative:      Collection has been rescheduled by [email or web address] at 06/28/2024 12:19 Reason:   Unable to collect .  Collection has been rescheduled by KML at 06/28/2024 16:08 Reason:   Unable to collect notified nurse Ludmila  Collection has been rescheduled by [email or web address] at 06/28/2024 12:19 Reason:   Unable to collect .  Collection has been rescheduled by KML at 06/28/2024 16:08 Reason:    Unable to collect notified nurse Ludmila    Culture, Anaerobic [[id number]] Collected: 07/01/24 1241    Order Status: Completed Specimen: Bone from Foot, Right Updated: 07/03/24 1104     Anaerobic Culture Culture in progress    Narrative:      Right calcaneus bone    AFB Culture & Smear [[id number]] Collected: 07/01/24 1241    Order Status: Completed Specimen: Bone from Foot, Right Updated: 07/02/24 2127     AFB Culture & Smear Culture in progress     AFB CULTURE STAIN No acid fast bacilli seen.    Narrative:      Right calcaneus bone    Blood culture [[id number]] Collected: 06/26/24 2344    Order Status: Completed Specimen: Blood Updated: 07/02/24 1212     Blood Culture, Routine No growth after 5 days.    Blood culture [[id number]] Collected: 06/26/24 2344    Order Status: Completed Specimen: Blood Updated: 07/02/24 1212     Blood Culture, Routine No growth after 5 days.    Fungus culture [[id number]] Collected: 07/01/24 1241    Order Status: Completed Specimen: Bone from Foot, Right Updated: 07/02/24 1038     Fungus (Mycology) Culture Culture in progress    Narrative:      Right calcaneus bone    Gram stain [[id number]] Collected: 07/01/24 1241    Order Status: Completed Specimen: Bone from Foot, Right Updated: 07/01/24 2157     Gram Stain Result No WBC's, epithelial cells or organisms seen    Narrative:      Right calcaneus bone          All pertinent labs reviewed within the last 24 hours.    Diagnostic Results:  I have reviewed all pertinent imaging results/findings within the past 24 hours.    Clinical Findings:  Right lateral heel ulceration  Post-debridement          Assessment/Plan:     ID  Osteomyelitis of right lower extremity  Saji Castañeda is a 74 y.o. male with s/p left AKA, and now right heel wound complicated by osteomyelitis, for which podiatry is consutled.     Assessment:  Post debridement of right heel ulcer: granular wound bed with healthy bleeding margins, proximally 5.0 x 4 5 x 0.5 cm with  "sanguinous drainage. Probe to bone positive.     S/p bedside bone biopsy obtained 7/1/24.     Plan:  - At this time patient amenable to conservative treatment only. Does not wish for any amputation or debridement.   - Tracing cultures: MRSA, pseudomonas from bone biopsy  - Abx per ID, appreciate recommendations. Plans to continue broad spectrum IV, flagyl for 6 weeks.   - Pain mgmt per primary  - Patient's right heel dressed with Hydrofera blue foam  - Agree with foam offloading of heel.   - Podiatry will follow    Future discharge recs:  - Recommend follow up at wound care facility to manage multiple wounds.   - NH to apply bandaging as described: Remove foot dressings. Cleanse wound with normal saline or wound . Dry well. Apply betadine to natalie wound maceration if needed. Apply hydraferal blue ready to right heel wound with 4x4 gauze padding. Place cast padding between toes to prevent pressure ulcers. Top with kerlix and Ace to moderate compression. Float in Z-flex offloading.   - Abx plan per ID  - Patient to LLE non weight bearing, RLE weight bearing as tolerated.  Darco shoe to right foot, weight-bearing forefoot touch to transfer.  Left AKA protection brace.  - Patient to keep dressings clean dry and intact  - Patient explained the importance of daily foot checks          Debridement    Date/Time: 7/5/2024 10:45 AM    Performed by: Gagan Franklin MD  Authorized by: Gagan Franklin MD    Time out: Immediately prior to procedure a "time out" was called to verify the correct patient, procedure, equipment, support staff and site/side marked as required.    Consent Done?:  Yes (Verbal)    Preparation: Patient was prepped and draped with clean technique    Local anesthesia used?: No         Length (cm):  5       Area (sq cm):  22.5       Width (cm):  4.5       Percent Debrided (%):  95       Depth (cm):  0.5       Total Area Debrided (sq cm):  21.38    Depth of debridement:  Bone    Tissue debrided:  Dermis, " Epidermis, Subcutaneous and Hypergranulation    Devitalized tissue debrided:  Biofilm, Slough, Fibrin and Necrotic/Eschar    Instruments:  Curette  Bleeding:  Minimal  Hemostasis Achieved: Yes  Method Used:  Pressure  Patient tolerance:  Patient tolerated the procedure well with no immediate complications  1st Wound Pain Assessment: 3     Patient tolerated well with minimal pain, bleeding controlled via pressure hemostasis.     Performed by Hamlet Mueller DPM with assistance by Gagan Franklin DPM.        [place] [facility]    Gagan Franklin DPM PGY-2  Podiatric Medicine & Surgery  [facility]  Secure Chat Preferred  Pager: [phone]

## 2024-07-05 NOTE — ASSESSMENT & PLAN NOTE
75 y/o M who has a pmh of Arthritis, Bacteremia due to Gram-negative bacteria, Diabetes mellitus, Diabetes mellitus, type 2, Hyperlipidemia, Hypertension, and left AKA who is admitted and found to have right heel osteo    -cultures with  pseudomonas and MRSA  -continue vanco with goal trough 15-20  -would stop cefepime and flagyl and start ceftaz/avibactam for  organism   -while outpatient will need weekly cbc, cmp, crp ,and vanco trough faxed to [phone] (or can be monitored at nursing home)  -needs 6 weeks of therapy (stop date is 8/12)  -ID will sign off

## 2024-07-05 NOTE — PLAN OF CARE
"   07/05/24 1051   Post-Acute Status   Post-Acute Authorization Placement   Post-Acute Placement Status Pending payor review/awaiting authorization (if required)   Coverage [employer]   Discharge Delays None known at this time   Discharge Plan   Discharge Plan A Skilled Nursing Facility;Return to nursing home     Expected Discharge  No discharge order  Medical Readiness for Discharge  Medically ready anticipated for 7/8/2024.  Updated by Kendra Sanabria RN on 7/5/2024 at 1050.  Expected Discharge: 7/8/2024  Expected Discharge Comment:Bone cx growing G- rods and S. Aureus, pending sensitivities. Return to [facility]. Pending SNF auth for return. Auth to be submitted by facility today for DC on Monday.    No future appointments.    No orders of the defined types were placed in this encounter.    Consults (From admission, onward)          Status Ordering Provider     Inpatient consult to PICC team ([facility])  Once        Provider:  (Not yet assigned)    Acknowledged LAMAR RESENDIZ     Inpatient consult to Social Work  Once        Provider:  (Not yet assigned)    Completed DEBBI HARRINGTON     Inpatient consult to Infectious Diseases  Once        Provider:  (Not yet assigned)    Completed HALEIGH TORRES     Pharmacy to dose Vancomycin consult  Once        Provider:  (Not yet assigned)   Placed in "And" Linked Group    Acknowledged HALEIGH TORRES     Inpatient consult to Cardiology-[facility]  Once        Provider:  (Not yet assigned)    Completed TERI MELTON     Inpatient consult to Podiatry  Once        Provider:  (Not yet assigned)    Completed HALEIGH TORRES     Inpatient consult to Gastroenterology  Once        Provider:  (Not yet assigned)    Completed HALEIGH TORRES     Inpatient consult to Registered Dietitian/Nutritionist  Once        Provider:  (Not yet assigned)    Completed SHARLENE CALVILLO     Inpatient consult to Registered Dietitian/Nutritionist  Once   "      Provider:  (Not yet assigned)    Completed SHARLENE CALVILLO

## 2024-07-05 NOTE — ASSESSMENT & PLAN NOTE
73 y/o M who has a pmh of Arthritis, Bacteremia due to Gram-negative bacteria, Diabetes mellitus, Diabetes mellitus, type 2, Hyperlipidemia, Hypertension, and left AKA who is admitted and found to have right heel osteo    -cultures with  pseudomonas and MRSA  -continue vanco with goal trough 15-20  -would stop cefepime and flagyl and start ceftaz/avibactam for  organism   -while outpatient will need weekly cbc, cmp, crp ,and vanco trough faxed to [phone] (or can be monitored at nursing home)  -needs 6 weeks of therapy (stop date is 8/12)  -ID will sign off

## 2024-07-05 NOTE — SUBJECTIVE & OBJECTIVE
Interval History: No acute events. Awaiting cx. Now with MRSA and  pseudomonas    Review of Systems   Constitutional:  Positive for activity change, appetite change, fatigue and fever (Low-grade).   HENT:  Negative for trouble swallowing.    Respiratory:  Negative for cough.    Cardiovascular:  Negative for chest pain.   Gastrointestinal:  Negative for diarrhea, nausea and vomiting.   Genitourinary:  Negative for hematuria.   Musculoskeletal:  Positive for arthralgias and myalgias.   Skin:  Positive for wound.   Neurological:  Positive for weakness.   Psychiatric/Behavioral:  Negative for confusion.      Objective:     Vital Signs (Most Recent):  Temp: 97.5 °F (36.4 °C) (07/05/24 1136)  Pulse: 81 (07/05/24 1136)  Resp: 20 (07/05/24 1136)  BP: (!) 154/72 (07/05/24 1136)  SpO2: 99 % (07/05/24 1136) Vital Signs (24h Range):  Temp:  [97.5 °F (36.4 °C)-99.2 °F (37.3 °C)] 97.5 °F (36.4 °C)  Pulse:  [77-91] 81  Resp:  [17-24] 20  SpO2:  [95 %-99 %] 99 %  BP: (127-154)/(59-80) 154/72     Weight: 86.8 kg (191 lb 5.8 oz)  Body mass index is 28.26 kg/m².    Estimated Creatinine Clearance: 99.4 mL/min (based on SCr of 0.7 mg/dL).     Physical Exam  Vitals and nursing note reviewed.   Constitutional:       Appearance: He is obese.   HENT:      Head: Normocephalic and atraumatic.      Mouth/Throat:      Mouth: Mucous membranes are moist.   Eyes:      Extraocular Movements: Extraocular movements intact.   Cardiovascular:      Rate and Rhythm: Normal rate.      Pulses: Normal pulses.      Heart sounds: Normal heart sounds.   Pulmonary:      Effort: Pulmonary effort is normal.      Breath sounds: Normal breath sounds.   Abdominal:      General: Bowel sounds are normal. There is no distension.      Palpations: Abdomen is soft.      Tenderness: There is no abdominal tenderness. There is no guarding.   Musculoskeletal:      Cervical back: Normal range of motion and neck supple.      Comments: Left AKA   Skin:     General: Skin is  warm and dry.      Capillary Refill: Capillary refill takes 2 to 3 seconds.   Neurological:      Mental Status: He is alert and oriented to person, place, and time. Mental status is at baseline.      Motor: No weakness.   Psychiatric:         Mood and Affect: Mood normal.         Behavior: Behavior normal.          Significant Labs: All pertinent labs within the past 24 hours have been reviewed.    Significant Imaging: I have reviewed all pertinent imaging results/findings within the past 24 hours.

## 2024-07-05 NOTE — ASSESSMENT & PLAN NOTE
Patient's anemia is currently uncontrolled. Has 2 u PRBC ordered. Etiology likely d/t Iron deficiency.   Current CBC reviewed-   Lab Results   Component Value Date    HGB 6.8 (L) 07/05/2024    HCT 22.1 (L) 07/05/2024     Monitor serial CBC and transfuse if patient becomes hemodynamically unstable, symptomatic or H/H drops below 7/21.  -attempted blood transfusion yesterday however patient developed chest transfusion was stopped.  Will order 1 pack of packed red blood cells this morning and premedicate prior.  Orders for p.r.n. Lasix to be given in between units.  PPI BID  --consulted GI.  --s/p endoscopy on 6/28:  Impression:            - Normal esophagus.                          - Erosive gastropathy with no stigmata of recent                          bleeding. Biopsied.                          - Normal examined duodenum.   Recommendation:        - [facility] for [facility].                          - Resume previous diet.                          - Continue present medications.                          - Await pathology results.                          - Observe patient's clinical course.                          - Perform a colonoscopy at appointment to be                          scheduled.

## 2024-07-05 NOTE — PROGRESS NOTES
[place] [facility] Surg  Infectious Disease  Progress Note    Patient Name: Saji Castañeda  MRN: [id number]  Admission Date: 6/26/2024  Length of Stay: 8 days  Attending Physician: Efrain Coleman MD  Primary Care Provider: No primary care provider on file.    Isolation Status: Contact  Assessment/Plan:      ID  Osteomyelitis of right lower extremity  73 y/o M who has a pmh of Arthritis, Bacteremia due to Gram-negative bacteria, Diabetes mellitus, Diabetes mellitus, type 2, Hyperlipidemia, Hypertension, and left AKA who is admitted and found to have right heel osteo    -cultures with  pseudomonas and MRSA  -continue vanco with goal trough 15-20  -would stop cefepime and flagyl and start ceftaz/avibactam for  organism   -while outpatient will need weekly cbc, cmp, crp ,and vanco trough faxed to [phone] (or can be monitored at nursing home)  -needs 6 weeks of therapy (stop date is 8/12)  -ID will sign off              Thank you for your consult. I will sign off. Please contact us if you have any additional questions.    Jaylan Arita MD  Infectious Disease  [place] - Med Surg    Subjective:     Principal Problem:Anemia    HPI: 73 y/o M who has a pmh of Arthritis, Bacteremia due to Gram-negative bacteria, Diabetes mellitus, Diabetes mellitus, type 2, Hyperlipidemia, Hypertension, Osteomyelitis, and Palliative care encounter presenting to the Emergency Department for referral. Patient was sent to the ED via EMS from [facility] for low blood count. Patient denies any chest pain, shortness on breath, weakness, fatigue, dizziness. ID is consulted for OM of right heel found on CT. On vanco and zosyn. He does not want amputation. Seen by cards and is not a revascularization candidate.   Interval History: No acute events. Awaiting cx. Now with MRSA and  pseudomonas    Review of Systems   Constitutional:  Positive for activity change, appetite change, fatigue and fever (Low-grade).   HENT:  Negative for  trouble swallowing.    Respiratory:  Negative for cough.    Cardiovascular:  Negative for chest pain.   Gastrointestinal:  Negative for diarrhea, nausea and vomiting.   Genitourinary:  Negative for hematuria.   Musculoskeletal:  Positive for arthralgias and myalgias.   Skin:  Positive for wound.   Neurological:  Positive for weakness.   Psychiatric/Behavioral:  Negative for confusion.      Objective:     Vital Signs (Most Recent):  Temp: 97.5 °F (36.4 °C) (07/05/24 1136)  Pulse: 81 (07/05/24 1136)  Resp: 20 (07/05/24 1136)  BP: (!) 154/72 (07/05/24 1136)  SpO2: 99 % (07/05/24 1136) Vital Signs (24h Range):  Temp:  [97.5 °F (36.4 °C)-99.2 °F (37.3 °C)] 97.5 °F (36.4 °C)  Pulse:  [77-91] 81  Resp:  [17-24] 20  SpO2:  [95 %-99 %] 99 %  BP: (127-154)/(59-80) 154/72     Weight: 86.8 kg (191 lb 5.8 oz)  Body mass index is 28.26 kg/m².    Estimated Creatinine Clearance: 99.4 mL/min (based on SCr of 0.7 mg/dL).     Physical Exam  Vitals and nursing note reviewed.   Constitutional:       Appearance: He is obese.   HENT:      Head: Normocephalic and atraumatic.      Mouth/Throat:      Mouth: Mucous membranes are moist.   Eyes:      Extraocular Movements: Extraocular movements intact.   Cardiovascular:      Rate and Rhythm: Normal rate.      Pulses: Normal pulses.      Heart sounds: Normal heart sounds.   Pulmonary:      Effort: Pulmonary effort is normal.      Breath sounds: Normal breath sounds.   Abdominal:      General: Bowel sounds are normal. There is no distension.      Palpations: Abdomen is soft.      Tenderness: There is no abdominal tenderness. There is no guarding.   Musculoskeletal:      Cervical back: Normal range of motion and neck supple.      Comments: Left AKA   Skin:     General: Skin is warm and dry.      Capillary Refill: Capillary refill takes 2 to 3 seconds.   Neurological:      Mental Status: He is alert and oriented to person, place, and time. Mental status is at baseline.      Motor: No weakness.    Psychiatric:         Mood and Affect: Mood normal.         Behavior: Behavior normal.          Significant Labs: All pertinent labs within the past 24 hours have been reviewed.    Significant Imaging: I have reviewed all pertinent imaging results/findings within the past 24 hours.

## 2024-07-05 NOTE — PHYSICIAN QUERY
Given the conflicting documentation, please clarify the renal diagnosis.     Chronic Kidney Disease (CKD) stage 2

## 2024-07-05 NOTE — ASSESSMENT & PLAN NOTE
Creatine stable for now. BMP reviewed- noted Estimated Creatinine Clearance: 87 mL/min (based on SCr of 0.8 mg/dL). according to latest data. Based on current GFR, CKD stage is stage 2 - GFR 60-89.  Monitor UOP and serial BMP and adjust therapy as needed. Renally dose meds. Avoid nephrotoxic medications and procedures.

## 2024-07-05 NOTE — PLAN OF CARE
07/05/24 1336   Post-Acute Status   Post-Acute Authorization Placement   Post-Acute Placement Status Referrals Sent   Discharge Plan   Discharge Plan A Long-term acute care facility (LTAC)   Discharge Plan B Skilled Nursing Facility     ID recs for DC noted. Facility updated. Spoke with DON. Unable to get Avycaz until at least a week from pharmacy. LTAC referrals sent. Current need for 2 IV ABX upon DC. NH cannot accommodate at this time at SNF. Supervisor alerted.    No future appointments.    No orders of the defined types were placed in this encounter.

## 2024-07-05 NOTE — PLAN OF CARE
Pts safety maintained, bed alarm on, call bell in reach, room near nursing station. Contact and central line precautions continued. Meds given per MAR. Dressing on buttocks and sacral area changed. Pain relieved with PRN Hydrocodone. No N/V, SOB, distress during night. Vitals stable.        Problem: Adult Inpatient Plan of Care  Goal: Plan of Care Review  Outcome: Progressing     Problem: Adult Inpatient Plan of Care  Goal: Absence of Hospital-Acquired Illness or Injury  Outcome: Progressing     Problem: Adult Inpatient Plan of Care  Goal: Readiness for Transition of Care  Outcome: Progressing     Problem: Diabetes Comorbidity  Goal: Blood Glucose Level Within Targeted Range  Outcome: Progressing     Problem: Skin Injury Risk Increased  Goal: Skin Health and Integrity  Outcome: Progressing     Problem: Pain Acute  Goal: Optimal Pain Control and Function  Outcome: Progressing

## 2024-07-05 NOTE — PROGRESS NOTES
Pharmacokinetic Assessment Follow Up: IV Vancomycin    Vancomycin serum concentration assessment(s):    The trough level was drawn correctly and can be used to guide therapy at this time. The measurement is within the desired definitive target range of 15 to 20 mcg/mL.    Vancomycin Regimen Plan:    Continue regimen to Vancomycin 1250 mg IV every 24 hours with next serum trough concentration measured at 1630 prior to third dose on 07/07/2024    Drug levels (last 3 results):  Recent Labs   Lab Result Units 07/03/24  1633 07/05/24  1647   Vancomycin-Trough ug/mL 15.0 15.2       Pharmacy will continue to follow and monitor vancomycin.    Please contact pharmacy at extension [phone] for questions regarding this assessment.    Thank you for the consult,   Edilia Vuong       Patient brief summary:  Saji Castañeda is a 74 y.o. male initiated on antimicrobial therapy with IV Vancomycin for treatment of bone/joint infection    The patient's current regimen is vancomycin 1250 mg ivpb  q24h     Drug Allergies:   Review of patient's allergies indicates:  No Known Allergies    Actual Body Weight:   86.8 kg    Renal Function:   Estimated Creatinine Clearance: 87 mL/min (based on SCr of 0.8 mg/dL).,     Dialysis Method (if applicable):  N/A    CBC (last 72 hours):  Recent Labs   Lab Result Units 07/03/24  0522 07/04/24  0520 07/05/24  0843   WBC K/uL 9.43 9.43 8.22   Hemoglobin g/dL 8.0* 7.7* 6.8*   Hematocrit % 27.1* 25.9* 22.1*   Platelets K/uL 560* 526* 516*   Gran % % 71.9 65.1 63.4   Lymph % % 13.6* 19.1 19.0   Mono % % 5.1 6.4 7.5   Eosinophil % % 8.6* 8.5* 9.1*   Basophil % % 0.4 0.5 0.4   Differential Method  Automated Automated Automated       Metabolic Panel (last 72 hours):  Recent Labs   Lab Result Units 07/04/24  0520 07/05/24  1105   Sodium mmol/L 136 138   Potassium mmol/L 3.9 3.4*   Chloride mmol/L 104 101   CO2 mmol/L 24 32*   Glucose mg/dL 154* 167*   BUN mg/dL 9 9   Creatinine mg/dL 0.7 0.8   Albumin  g/dL  --  1.3*   Total Bilirubin mg/dL  --  0.2   Alkaline Phosphatase U/L  --  54*   AST U/L  --  8*   ALT U/L  --  5*       Vancomycin Administrations:  vancomycin given in the last 96 hours                     vancomycin 1,250 mg in D5W 250 mL IVPB (Vial-Mate) (mg) 1,250 mg New Bag 07/05/24 1748     1,250 mg New Bag 07/04/24 1649     1,250 mg New Bag 07/03/24 1811    vancomycin 1,250 mg in D5W 250 mL IVPB (Vial-Mate) ()  Restarted 07/02/24 1931      Restarted  1842     1,250 mg New Bag  1723                    Microbiologic Results:  Microbiology Results (last 7 days)       Procedure Component Value Units Date/Time    Aerobic culture [[id number]]  (Abnormal)  (Susceptibility) Collected: 07/01/24 1241    Order Status: Completed Specimen: Bone from Foot, Right Updated: 07/05/24 1333     Aerobic Bacterial Culture METHICILLIN RESISTANT STAPHYLOCOCCUS AUREUS  Few        PSEUDOMONAS AERUGINOSA   Few  Susceptibility pending      Narrative:      Right calcaneus bone    Culture, Anaerobic [[id number]] Collected: 07/01/24 1241    Order Status: Completed Specimen: Bone from Foot, Right Updated: 07/05/24 1201     Anaerobic Culture No anaerobes isolated    Narrative:      Right calcaneus bone    Blood culture [[id number]] Collected: 06/28/24 2252    Order Status: Completed Specimen: Blood from Peripheral, Hand, Right Updated: 07/04/24 1012     Blood Culture, Routine No growth after 5 days.    Narrative:      Collection has been rescheduled by [email or web address] at 06/28/2024 12:19 Reason:   Unable to collect .  Collection has been rescheduled by KML at 06/28/2024 16:08 Reason:   Unable to collect notified nurse Ludmila  Collection has been rescheduled by [email or web address] at 06/28/2024 12:19 Reason:   Unable to collect .  Collection has been rescheduled by KML at 06/28/2024 16:08 Reason:   Unable to collect notified nurse Ludmila    Blood culture [[id number]] Collected: 06/28/24 2251    Order Status: Completed Specimen: Blood from Peripheral, Hand, Right  Updated: 07/04/24 1012     Blood Culture, Routine No growth after 5 days.    Narrative:      Collection has been rescheduled by [email or web address] at 06/28/2024 12:19 Reason:   Unable to collect .  Collection has been rescheduled by KML at 06/28/2024 16:08 Reason:   Unable to collect notified nurse Ludmila  Collection has been rescheduled by [email or web address] at 06/28/2024 12:19 Reason:   Unable to collect .  Collection has been rescheduled by KML at 06/28/2024 16:08 Reason:   Unable to collect notified nurse Ludmila    AFB Culture & Smear [[id number]] Collected: 07/01/24 1241    Order Status: Completed Specimen: Bone from Foot, Right Updated: 07/02/24 2127     AFB Culture & Smear Culture in progress     AFB CULTURE STAIN No acid fast bacilli seen.    Narrative:      Right calcaneus bone    Blood culture [[id number]] Collected: 06/26/24 2344    Order Status: Completed Specimen: Blood Updated: 07/02/24 1212     Blood Culture, Routine No growth after 5 days.    Blood culture [[id number]] Collected: 06/26/24 2344    Order Status: Completed Specimen: Blood Updated: 07/02/24 1212     Blood Culture, Routine No growth after 5 days.    Fungus culture [[id number]] Collected: 07/01/24 1241    Order Status: Completed Specimen: Bone from Foot, Right Updated: 07/02/24 1038     Fungus (Mycology) Culture Culture in progress    Narrative:      Right calcaneus bone    Gram stain [[id number]] Collected: 07/01/24 1241    Order Status: Completed Specimen: Bone from Foot, Right Updated: 07/01/24 2157     Gram Stain Result No WBC's, epithelial cells or organisms seen    Narrative:      Right calcaneus bone

## 2024-07-05 NOTE — ASSESSMENT & PLAN NOTE
Saji Castañeda is a 74 y.o. male with s/p left AKA, and now right heel wound complicated by osteomyelitis, for which podiatry is consutled.     Assessment:  Post debridement of right heel ulcer: granular wound bed with healthy bleeding margins, proximally 5.0 x 4 5 x 0.5 cm with sanguinous drainage. Probe to bone positive.     S/p bedside bone biopsy obtained 7/1/24.     Plan:  - At this time patient amenable to conservative treatment only. Does not wish for any amputation or debridement.   - Tracing cultures: MRSA, pseudomonas from bone biopsy  - Abx per ID, appreciate recommendations. Plans to continue broad spectrum IV, flagyl for 6 weeks.   - Pain mgmt per primary  - Patient's right heel dressed with Hydrofera blue foam  - Agree with foam offloading of heel.   - Podiatry will follow    Future discharge recs:  - Patient to follow up in Podiatry Clinic outpatient, Podiatry will arrange.  - NH to apply bandaging as described: Remove foot dressings. Cleanse wound with normal saline or wound . Dry well. Apply betadine to natalie wound maceration if needed. Apply hydraferal blue ready to right heel wound with 4x4 gauze padding. Place cast padding between toes to prevent pressure ulcers. Top with kerlix and Ace to moderate compression. Float in Z-flex offloading.   - Abx plan per ID  - Patient to LLE non weight bearing, RLE weight bearing as tolerated.  Darco shoe to right foot, weight-bearing forefoot touch to transfer.  Left AKA protection brace.  - Patient to keep dressings clean dry and intact  - Patient explained the importance of daily foot checks

## 2024-07-05 NOTE — PROGRESS NOTES
[facility] Medicine  Progress Note    Patient Name: Saji Castañeda  MRN: [id number]  Patient Class: IP- Inpatient   Admission Date: 6/26/2024  Length of Stay: 8 days  Attending Physician: Efrain Coleman MD  Primary Care Provider: No primary care provider on file.        Subjective:     Principal Problem:Anemia        HPI:  Saji Castañeda is a 73 y/o M who has a pmh of Arthritis, Bacteremia due to Gram-negative bacteria, Diabetes mellitus, Diabetes mellitus, type 2, Hyperlipidemia, Hypertension, Osteomyelitis, and Palliative care encounter. presenting to the Emergency Department for referral. Patient was sent to the ED via EMS from [facility] for low blood count. Patient denies any chest pain, shortness on breath, weakness, fatigue, dizziness. His hemoglobin was 6.3--will order 1 u PRBC ordered. FOBT pending. Will admit to the [facility] service for further care.    Overview/Hospital Course:  He was admitted to the hospital medicine service for further care.  Patient noted with anemia as well as osteomyelitis on admission.  Patient underwent a EGD on 06/28 which noted Erosive gastropathy.  Anticoagulation is on hold until cleared by GI.  He did receive 1 unit of packed red blood cells on admission.  CT of his right foot noted with osteomyelitis.  Appreciate Podiatry is evaluation---status post bone biopsy on today---also appreciate infectious disease.    Interval History: Nursing notes reviewed and no acute events overnight. VSS. Pt w/ no c/o. Hg 6.8 today and 1U pRBC transfused. Cultures show MRSA and Pseudomonas. Avycaz initiated, stopped cefepime and Flagyl, continue Vanc. I&D of right heel done by Podiatry today. Waiting for LTAC placement.    Review of Systems   Constitutional:  Positive for activity change, appetite change, fatigue and fever (Low-grade).   HENT:  Negative for trouble swallowing.    Respiratory:  Negative for cough.    Cardiovascular:  Negative for chest pain.    Gastrointestinal:  Negative for diarrhea, nausea and vomiting.   Genitourinary:  Negative for hematuria.   Musculoskeletal:  Positive for arthralgias and myalgias.   Skin:  Positive for wound.   Neurological:  Positive for weakness.   Psychiatric/Behavioral:  Negative for confusion.      Objective:     Vital Signs (Most Recent):  Temp: 98.5 °F (36.9 °C) (07/05/24 1507)  Pulse: 79 (07/05/24 1507)  Resp: 12 (07/05/24 1507)  BP: (!) 147/66 (07/05/24 1507)  SpO2: 100 % (07/05/24 1507) Vital Signs (24h Range):  Temp:  [97.5 °F (36.4 °C)-99.2 °F (37.3 °C)] 98.5 °F (36.9 °C)  Pulse:  [77-91] 79  Resp:  [12-24] 12  SpO2:  [95 %-100 %] 100 %  BP: (127-154)/(59-80) 147/66     Weight: 86.8 kg (191 lb 5.8 oz)  Body mass index is 28.26 kg/m².    Intake/Output Summary (Last 24 hours) at 7/5/2024 1558  Last data filed at 7/5/2024 1200  Gross per 24 hour   Intake 826.07 ml   Output 750 ml   Net 76.07 ml         Physical Exam  Vitals and nursing note reviewed.   Constitutional:       Appearance: He is obese.   HENT:      Head: Normocephalic and atraumatic.      Mouth/Throat:      Mouth: Mucous membranes are moist.   Eyes:      Extraocular Movements: Extraocular movements intact.   Cardiovascular:      Rate and Rhythm: Normal rate.      Pulses: Normal pulses.      Heart sounds: Normal heart sounds.   Pulmonary:      Effort: Pulmonary effort is normal.      Breath sounds: Normal breath sounds.   Abdominal:      General: Bowel sounds are normal. There is no distension.      Palpations: Abdomen is soft.      Tenderness: There is no abdominal tenderness. There is no guarding.   Musculoskeletal:      Cervical back: Normal range of motion and neck supple.      Comments: Left AKA   Skin:     General: Skin is warm and dry.      Capillary Refill: Capillary refill takes 2 to 3 seconds.   Neurological:      Mental Status: He is alert and oriented to person, place, and time. Mental status is at baseline.      Motor: No weakness.   Psychiatric:          Mood and Affect: Mood normal.         Behavior: Behavior normal.             Significant Labs: All pertinent labs within the past 24 hours have been reviewed.    Significant Imaging: I have reviewed all pertinent imaging results/findings within the past 24 hours.    Assessment/Plan:      * Anemia  Patient's anemia is currently uncontrolled. Has 2 u PRBC ordered. Etiology likely d/t Iron deficiency.   Current CBC reviewed-   Lab Results   Component Value Date    HGB 6.8 (L) 07/05/2024    HCT 22.1 (L) 07/05/2024     Monitor serial CBC and transfuse if patient becomes hemodynamically unstable, symptomatic or H/H drops below 7/21.  -attempted blood transfusion yesterday however patient developed chest transfusion was stopped.  Will order 1 pack of packed red blood cells this morning and premedicate prior.  Orders for p.r.n. Lasix to be given in between units.  PPI BID  --consulted GI.  --s/p endoscopy on 6/28:  Impression:            - Normal esophagus.                          - Erosive gastropathy with no stigmata of recent                          bleeding. Biopsied.                          - Normal examined duodenum.   Recommendation:        - Return patient to hospital [facility] for ongoing care.                          - Resume previous diet.                          - Continue present medications.                          - Await pathology results.                          - Observe patient's clinical course.                          - Perform a colonoscopy at appointment to be                          scheduled.     Pressure injury of buttock, stage 4        Pressure injury of right heel, stage 4        Pressure injury of ankle, unstageable-lateral  Pressure injury of buttock, stage 4   Pressure injury of right heel, stage 4     - Appreciate IP wound care  - Pressure injury prevention interventions - waffle overlay and heel boot  - Vashe wet-to-dry to bilateral buttocks, R buttock, and R heel wound BID  -  Mepilex border to L posterior thigh/gluteal fold 3x/week  - Podiatry consulted for R heel wound   --also noted with a bilateral buttock full thickness pressure injury  --Right heel and sacrum CT ordered to r/o OM  --appreciate cardiology's evaluation      Wounds, multiple  -patient with wounds to his sacrum and heel  -wound care consulted  -Ulcer care ordered      Chronic anticoagulation  Continue home OAC      Stage 3b chronic kidney disease  Creatine stable for now. BMP reviewed- noted Estimated Creatinine Clearance: 87 mL/min (based on SCr of 0.8 mg/dL). according to latest data. Based on current GFR, CKD stage is stage 2 - GFR 60-89.  Monitor UOP and serial BMP and adjust therapy as needed. Renally dose meds. Avoid nephrotoxic medications and procedures.    Paroxysmal atrial fibrillation  Patient with trial fibrillation which is uncontrolled currently with Calcium Channel Blocker. Patient is currently in atrial fibrillation.[id number] Score: 4. . Anticoagulation done with Eliquis    Osteomyelitis of right lower extremity  - Consulted ID, appreciate rec's  - IV abx ordered---vancomycin and Avycaz. Discontinue Cefepime and Flagyl  - Blood cultures from 6/26 with no growth to date  - Bone culture grew Pseudomonas and MRSA  -Appreciate IDs plan below:  -will need 6 weeks of therapy  -while outpatient will need weekly cbc, cmp, crp ,and vanco trough faxed to [phone] (or can be monitored at nursing home  - Appreciate Podiatry assistance with I&D today        Chronic deep vein thrombosis (DVT) of right upper extremity  -home AC resumed  -follow      PVD (peripheral vascular disease)        Peripheral arterial disease  PVD (peripheral vascular disease)    -Restarted OAC and ASA      Type 2 diabetes mellitus, with long-term current use of insulin    Patient's FSGs are uncontrolled due to hyperglycemia on current medication regimen.  Last A1c reviewed-   Lab Results   Component Value Date    HGBA1C 6.8 (H) 06/26/2024      Most recent fingerstick glucose reviewed-   Recent Labs   Lab 07/02/24 2055 07/03/24  0601 07/03/24  1112 07/03/24  1556   POCTGLUCOSE 224* 170* 161* 188*       Current correctional scale  Low  Maintain anti-hyperglycemic dose as follows-   Antihyperglycemics (From admission, onward)      Start     Stop Route Frequency Ordered    06/26/24 1552  insulin aspart U-100 pen 0-5 Units         -- SubQ Before meals & nightly PRN 06/26/24 1453          Hold Oral hypoglycemics while patient is in the hospital.      Essential hypertension  Chronic, uncontrolled. Latest blood pressure and vitals reviewed-     Temp:  [98.3 °F (36.8 °C)-98.6 °F (37 °C)]   Pulse:  [45-88]   Resp:  [16-20]   BP: (109-157)/(55-72)   SpO2:  [95 %-99 %] .   Home meds for hypertension were reviewed and noted below.   Hypertension Medications               furosemide (LASIX) 20 MG tablet Take 1 tablet (20 mg total) by mouth once daily.    NIFEdipine (PROCARDIA-XL) 90 MG (OSM) 24 hr tablet Take 1 tablet (90 mg total) by mouth once daily.    bumetanide (BUMEX) 0.5 MG Tab Take 0.5 mg by mouth daily as needed.    bumetanide (BUMEX) 1 MG tablet Take by mouth. unknown    hydrALAZINE (APRESOLINE) 100 MG tablet Take by mouth. unknown    isosorbide dinitrate (ISORDIL) 10 MG tablet Take 1 tablet (10 mg total) by mouth 3 (three) times daily. Hold until follow up with a provider            While in the hospital, will manage blood pressure as follows; will resume meds as tolerated     Will utilize p.r.n. blood pressure medication only if patient's blood pressure greater than 160/100 and he develops symptoms such as worsening chest pain or shortness of breath.      VTE Risk Mitigation (From admission, onward)           Ordered     apixaban tablet 5 mg  2 times daily         07/01/24 2139     Reason for No Pharmacological VTE Prophylaxis  Once        Question:  Reasons:  Answer:  Risk of Bleeding    06/26/24 1453     IP VTE HIGH RISK PATIENT  Once          06/26/24 1453     Place sequential compression device  Until discontinued         06/26/24 1453                    Discharge Planning   OXANA: 7/8/2024     Code Status: Full Code   Is the patient medically ready for discharge?:     Reason for patient still in hospital (select all that apply): Patient trending condition, Treatment, Consult recommendations, and Pending disposition  Discharge Plan A: Long-term acute care facility (LTAC)   Discharge Delays: None known at this time              Ervin Valdovinos PA-C  Department of Hospital Medicine   [facility]

## 2024-07-05 NOTE — PLAN OF CARE
Pt. AAOx4. Pt. Sleeps between nursing rounds, easily aroused. L PICC C/D/I. Contact precautions maintained. CHG bath completed. Urinary catheter in place. Wound care completed per orders. Pt. Received 1 unit of blood during shift. PRN administered prior to wound care. Tele monitored. BS monitored. Pt. Turned q 2 using wedge per orders. Bed alarm on and call light in reach. Pt. Instructed to call for assistance. Plan of care continued.     Problem: Adult Inpatient Plan of Care  Goal: Plan of Care Review  Outcome: Progressing     Problem: Adult Inpatient Plan of Care  Goal: Patient-Specific Goal (Individualized)  Outcome: Progressing     Problem: Adult Inpatient Plan of Care  Goal: Absence of Hospital-Acquired Illness or Injury  Outcome: Progressing     Problem: Adult Inpatient Plan of Care  Goal: Optimal Comfort and Wellbeing  Outcome: Progressing

## 2024-07-05 NOTE — SUBJECTIVE & OBJECTIVE
Subjective:     Interval History:  Patient seen and evaluated bedside by Podiatry; no adverse events overnight.  Denies symptoms of fevers, nausea.  Final recommendations given for discharge.    Follow-up For: Procedure(s) (LRB):  EGD (ESOPHAGOGASTRODUODENOSCOPY) (N/A)    Post-Operative Day: 7 Days Post-Op    Scheduled Meds:   apixaban  5 mg Oral BID    aspirin  81 mg Oral Daily    ceFEPime IV (PEDS and ADULTS)  2 g Intravenous Q8H    ferrous sulfate  1 tablet Oral Daily    gabapentin  300 mg Oral BID    isosorbide dinitrate  10 mg Oral TID    LIDOcaine (PF) 10 mg/ml (1%)  1 mL Other Once    metroNIDAZOLE  500 mg Oral Q8H    pantoprazole  40 mg Intravenous BID    senna-docusate 8.6-50 mg  1 tablet Oral BID    sodium chloride 0.9%  10 mL Intravenous Q8H    tamsulosin  0.4 mg Oral Daily    vancomycin (VANCOCIN) IV (PEDS and ADULTS)  1,250 mg Intravenous Q24H     Continuous Infusions:  PRN Meds:  Current Facility-Administered Medications:     0.9%  NaCl infusion (for blood administration), , Intravenous, Q24H PRN    0.9%  NaCl infusion (for blood administration), , Intravenous, Q24H PRN    acetaminophen, 650 mg, Oral, Q4H PRN    albuterol-ipratropium, 3 mL, Nebulization, Q4H PRN    aluminum-magnesium hydroxide-simethicone, 30 mL, Oral, QID PRN    dextrose 10%, 12.5 g, Intravenous, PRN    dextrose 10%, 25 g, Intravenous, PRN    diphenhydrAMINE, 25 mg, Oral, Q6H PRN    furosemide (LASIX) injection, 20 mg, Intravenous, PRN    glucagon (human recombinant), 1 mg, Intramuscular, PRN    glucose, 16 g, Oral, PRN    glucose, 24 g, Oral, PRN    HYDROcodone-acetaminophen, 1 tablet, Oral, Q6H PRN    insulin aspart U-100, 0-5 Units, Subcutaneous, QID (AC + HS) PRN    melatonin, 6 mg, Oral, Nightly PRN    morphine, 1 mg, Intravenous, Q6H PRN    ondansetron, 8 mg, Oral, Q8H PRN    ondansetron, 4 mg, Intravenous, Q8H PRN    simethicone, 1 tablet, Oral, QID PRN    Pharmacy to dose Vancomycin consult, , , Once **AND** vancomycin -  pharmacy to dose, , Intravenous, pharmacy to manage frequency    Review of Systems   Constitutional:  Negative for chills and fever.   Respiratory:  Negative for cough and shortness of breath.    Cardiovascular:  Negative for chest pain.   Gastrointestinal:  Negative for constipation, diarrhea, nausea and vomiting.   Skin:  Positive for wound.     Objective:     Vital Signs (Most Recent):  Temp: 98.7 °F (37.1 °C) (07/05/24 0745)  Pulse: 77 (07/05/24 0807)  Resp: (!) 24 (07/05/24 0745)  BP: (!) 150/67 (07/05/24 0745)  SpO2: 95 % (07/05/24 0807) Vital Signs (24h Range):  Temp:  [98.4 °F (36.9 °C)-99.2 °F (37.3 °C)] 98.7 °F (37.1 °C)  Pulse:  [77-91] 77  Resp:  [17-24] 24  SpO2:  [95 %-98 %] 95 %  BP: (127-150)/(59-80) 150/67     Weight: 86.8 kg (191 lb 5.8 oz)  Body mass index is 28.26 kg/m².    Foot Exam    General  General Appearance: appears stated age and healthy   Orientation: alert and oriented to person, place, and time   Affect: appropriate       Right Foot/Ankle     Inspection and Palpation  Ecchymosis: none  Tenderness: none   Swelling: none   Skin Exam: skin intact;     Neurovascular  Dorsalis pedis: 1+  Posterior tibial: absent  Saphenous nerve sensation: diminished  Tibial nerve sensation: diminished  Superficial peroneal nerve sensation: diminished  Deep peroneal nerve sensation: diminished  Sural nerve sensation: diminished    Comments  Right heel present with pressure ulceration to the depth of bone; periwound maceration noted with black eschar. Fibrogranular wound bed.  No malodor noted.      Post debridement, granular wound bed with healthy bleeding margins, proximally 5.0 x 4 5 x 0.5 cm with sanguinous drainage.           Left Foot/Ankle      Inspection and Palpation  Skin Exam: skin intact;     Comments  Left AKA.      Laboratory:  BMP:   Recent Labs   Lab 07/02/24  0522 07/04/24  0520   * 154*    136   K 3.5 3.9    104   CO2 32* 24   BUN 7* 9   CREATININE 0.8 0.7   CALCIUM 8.0*  8.7   MG 1.8  --      CBC:   Recent Labs   Lab 07/05/24  0843   WBC 8.22   RBC 2.66*   HGB 6.8*   HCT 22.1*   *   MCV 83   MCH 25.6*   MCHC 30.8*     Microbiology Results (last 7 days)       Procedure Component Value Units Date/Time    Aerobic culture [[id number]]  (Abnormal)  (Susceptibility) Collected: 07/01/24 1241    Order Status: Completed Specimen: Bone from Foot, Right Updated: 07/05/24 1037     Aerobic Bacterial Culture METHICILLIN RESISTANT STAPHYLOCOCCUS AUREUS  Few        PSEUDOMONAS AERUGINOSA   Few  Identification and susceptibility pending      Narrative:      Right calcaneus bone    Blood culture [[id number]] Collected: 06/28/24 2252    Order Status: Completed Specimen: Blood from Peripheral, Hand, Right Updated: 07/04/24 1012     Blood Culture, Routine No growth after 5 days.    Narrative:      Collection has been rescheduled by [email or web address] at 06/28/2024 12:19 Reason:   Unable to collect .  Collection has been rescheduled by KML at 06/28/2024 16:08 Reason:   Unable to collect notified nurse Ludmila  Collection has been rescheduled by [email or web address] at 06/28/2024 12:19 Reason:   Unable to collect .  Collection has been rescheduled by KML at 06/28/2024 16:08 Reason:   Unable to collect notified nurse Ludmila    Blood culture [[id number]] Collected: 06/28/24 2251    Order Status: Completed Specimen: Blood from Peripheral, Hand, Right Updated: 07/04/24 1012     Blood Culture, Routine No growth after 5 days.    Narrative:      Collection has been rescheduled by [email or web address] at 06/28/2024 12:19 Reason:   Unable to collect .  Collection has been rescheduled by KML at 06/28/2024 16:08 Reason:   Unable to collect notified nurse Ludmila  Collection has been rescheduled by [email or web address] at 06/28/2024 12:19 Reason:   Unable to collect .  Collection has been rescheduled by KML at 06/28/2024 16:08 Reason:   Unable to collect notified nurse Ludmila    Culture, Anaerobic [[id number]] Collected: 07/01/24 1241    Order Status: Completed Specimen: Bone  from Foot, Right Updated: 07/03/24 1104     Anaerobic Culture Culture in progress    Narrative:      Right calcaneus bone    AFB Culture & Smear [[id number]] Collected: 07/01/24 1241    Order Status: Completed Specimen: Bone from Foot, Right Updated: 07/02/24 2127     AFB Culture & Smear Culture in progress     AFB CULTURE STAIN No acid fast bacilli seen.    Narrative:      Right calcaneus bone    Blood culture [[id number]] Collected: 06/26/24 2344    Order Status: Completed Specimen: Blood Updated: 07/02/24 1212     Blood Culture, Routine No growth after 5 days.    Blood culture [[id number]] Collected: 06/26/24 2344    Order Status: Completed Specimen: Blood Updated: 07/02/24 1212     Blood Culture, Routine No growth after 5 days.    Fungus culture [[id number]] Collected: 07/01/24 1241    Order Status: Completed Specimen: Bone from Foot, Right Updated: 07/02/24 1038     Fungus (Mycology) Culture Culture in progress    Narrative:      Right calcaneus bone    Gram stain [[id number]] Collected: 07/01/24 1241    Order Status: Completed Specimen: Bone from Foot, Right Updated: 07/01/24 2157     Gram Stain Result No WBC's, epithelial cells or organisms seen    Narrative:      Right calcaneus bone          All pertinent labs reviewed within the last 24 hours.    Diagnostic Results:  I have reviewed all pertinent imaging results/findings within the past 24 hours.    Clinical Findings:  Right lateral heel ulceration  Post-debridement

## 2024-07-06 LAB
ALBUMIN SERPL BCP-MCNC: 1.5 G/DL (ref 3.5–5.2)
ALP SERPL-CCNC: 53 U/L (ref 55–135)
ALT SERPL W/O P-5'-P-CCNC: <5 U/L (ref 10–44)
ANION GAP SERPL CALC-SCNC: 7 MMOL/L (ref 8–16)
AST SERPL-CCNC: 10 U/L (ref 10–40)
BACTERIA SPEC AEROBE CULT: ABNORMAL
BACTERIA SPEC AEROBE CULT: ABNORMAL
BASOPHILS # BLD AUTO: 0.04 K/UL (ref 0–0.2)
BASOPHILS NFR BLD: 0.5 % (ref 0–1.9)
BILIRUB SERPL-MCNC: 0.3 MG/DL (ref 0.1–1)
BUN SERPL-MCNC: 7 MG/DL (ref 8–23)
CALCIUM SERPL-MCNC: 8.4 MG/DL (ref 8.7–10.5)
CHLORIDE SERPL-SCNC: 101 MMOL/L (ref 95–110)
CO2 SERPL-SCNC: 30 MMOL/L (ref 23–29)
CREAT SERPL-MCNC: 0.7 MG/DL (ref 0.5–1.4)
DIFFERENTIAL METHOD BLD: ABNORMAL
EOSINOPHIL # BLD AUTO: 0.9 K/UL (ref 0–0.5)
EOSINOPHIL NFR BLD: 10.7 % (ref 0–8)
ERYTHROCYTE [DISTWIDTH] IN BLOOD BY AUTOMATED COUNT: 18 % (ref 11.5–14.5)
EST. GFR  (NO RACE VARIABLE): >60 ML/MIN/1.73 M^2
GLUCOSE SERPL-MCNC: 149 MG/DL (ref 70–110)
HCT VFR BLD AUTO: 25 % (ref 40–54)
HCT VFR BLD AUTO: 26.2 % (ref 40–54)
HGB BLD-MCNC: 7.9 G/DL (ref 14–18)
HGB BLD-MCNC: 8.1 G/DL (ref 14–18)
IMM GRANULOCYTES # BLD AUTO: 0.05 K/UL (ref 0–0.04)
IMM GRANULOCYTES NFR BLD AUTO: 0.6 % (ref 0–0.5)
LYMPHOCYTES # BLD AUTO: 1.9 K/UL (ref 1–4.8)
LYMPHOCYTES NFR BLD: 22.5 % (ref 18–48)
MCH RBC QN AUTO: 26 PG (ref 27–31)
MCHC RBC AUTO-ENTMCNC: 31.6 G/DL (ref 32–36)
MCV RBC AUTO: 82 FL (ref 82–98)
MONOCYTES # BLD AUTO: 0.8 K/UL (ref 0.3–1)
MONOCYTES NFR BLD: 9.2 % (ref 4–15)
NEUTROPHILS # BLD AUTO: 4.8 K/UL (ref 1.8–7.7)
NEUTROPHILS NFR BLD: 56.5 % (ref 38–73)
NRBC BLD-RTO: 0 /100 WBC
PLATELET # BLD AUTO: 435 K/UL (ref 150–450)
PMV BLD AUTO: 8.1 FL (ref 9.2–12.9)
POCT GLUCOSE: 162 MG/DL (ref 70–110)
POCT GLUCOSE: 186 MG/DL (ref 70–110)
POCT GLUCOSE: 207 MG/DL (ref 70–110)
POCT GLUCOSE: 248 MG/DL (ref 70–110)
POTASSIUM SERPL-SCNC: 3.5 MMOL/L (ref 3.5–5.1)
PROT SERPL-MCNC: 5.9 G/DL (ref 6–8.4)
RBC # BLD AUTO: 3.04 M/UL (ref 4.6–6.2)
SODIUM SERPL-SCNC: 138 MMOL/L (ref 136–145)
WBC # BLD AUTO: 8.4 K/UL (ref 3.9–12.7)

## 2024-07-06 PROCEDURE — 80053 COMPREHEN METABOLIC PANEL: CPT

## 2024-07-06 PROCEDURE — 25000003 PHARM REV CODE 250: Performed by: HOSPITALIST

## 2024-07-06 PROCEDURE — 85025 COMPLETE CBC W/AUTO DIFF WBC: CPT | Performed by: NURSE PRACTITIONER

## 2024-07-06 PROCEDURE — 63600175 PHARM REV CODE 636 W HCPCS: Mod: JZ,JG

## 2024-07-06 PROCEDURE — 63600175 PHARM REV CODE 636 W HCPCS: Performed by: HOSPITALIST

## 2024-07-06 PROCEDURE — 63600175 PHARM REV CODE 636 W HCPCS: Performed by: NURSE PRACTITIONER

## 2024-07-06 PROCEDURE — 21400001 HC TELEMETRY ROOM

## 2024-07-06 PROCEDURE — 85014 HEMATOCRIT: CPT

## 2024-07-06 PROCEDURE — A4216 STERILE WATER/SALINE, 10 ML: HCPCS | Performed by: NURSE PRACTITIONER

## 2024-07-06 PROCEDURE — 25000003 PHARM REV CODE 250: Performed by: NURSE PRACTITIONER

## 2024-07-06 PROCEDURE — 27000207 HC ISOLATION

## 2024-07-06 PROCEDURE — 25000003 PHARM REV CODE 250

## 2024-07-06 PROCEDURE — 85018 HEMOGLOBIN: CPT

## 2024-07-06 PROCEDURE — 94761 N-INVAS EAR/PLS OXIMETRY MLT: CPT

## 2024-07-06 PROCEDURE — 99900035 HC TECH TIME PER 15 MIN (STAT)

## 2024-07-06 RX ORDER — ISOSORBIDE DINITRATE 10 MG/1
10 TABLET ORAL 3 TIMES DAILY
Qty: 90 TABLET | Refills: 11 | Status: CANCELLED | OUTPATIENT
Start: 2024-07-06 | End: 2025-07-06

## 2024-07-06 RX ADMIN — ASPIRIN 81 MG: 81 TABLET, COATED ORAL at 08:07

## 2024-07-06 RX ADMIN — DOCUSATE SODIUM AND SENNOSIDES 1 TABLET: 8.6; 5 TABLET, FILM COATED ORAL at 08:07

## 2024-07-06 RX ADMIN — INSULIN ASPART 1 UNITS: 100 INJECTION, SOLUTION INTRAVENOUS; SUBCUTANEOUS at 09:07

## 2024-07-06 RX ADMIN — ISOSORBIDE DINITRATE 10 MG: 10 TABLET ORAL at 08:07

## 2024-07-06 RX ADMIN — VANCOMYCIN HYDROCHLORIDE 1250 MG: 1.25 INJECTION, POWDER, LYOPHILIZED, FOR SOLUTION INTRAVENOUS at 04:07

## 2024-07-06 RX ADMIN — APIXABAN 5 MG: 5 TABLET, FILM COATED ORAL at 08:07

## 2024-07-06 RX ADMIN — TAMSULOSIN HYDROCHLORIDE 0.4 MG: 0.4 CAPSULE ORAL at 08:07

## 2024-07-06 RX ADMIN — MORPHINE SULFATE 1 MG: 2 INJECTION, SOLUTION INTRAMUSCULAR; INTRAVENOUS at 11:07

## 2024-07-06 RX ADMIN — INSULIN ASPART 2 UNITS: 100 INJECTION, SOLUTION INTRAVENOUS; SUBCUTANEOUS at 04:07

## 2024-07-06 RX ADMIN — Medication 10 ML: at 02:07

## 2024-07-06 RX ADMIN — PANTOPRAZOLE SODIUM 40 MG: 40 INJECTION, POWDER, LYOPHILIZED, FOR SOLUTION INTRAVENOUS at 09:07

## 2024-07-06 RX ADMIN — Medication 10 ML: at 05:07

## 2024-07-06 RX ADMIN — APIXABAN 5 MG: 5 TABLET, FILM COATED ORAL at 09:07

## 2024-07-06 RX ADMIN — MORPHINE SULFATE 1 MG: 2 INJECTION, SOLUTION INTRAMUSCULAR; INTRAVENOUS at 05:07

## 2024-07-06 RX ADMIN — CEFTAZIDIME, AVIBACTAM 2.5 G: 2; .5 POWDER, FOR SOLUTION INTRAVENOUS at 09:07

## 2024-07-06 RX ADMIN — MORPHINE SULFATE 1 MG: 2 INJECTION, SOLUTION INTRAMUSCULAR; INTRAVENOUS at 04:07

## 2024-07-06 RX ADMIN — CEFTAZIDIME, AVIBACTAM 2.5 G: 2; .5 POWDER, FOR SOLUTION INTRAVENOUS at 02:07

## 2024-07-06 RX ADMIN — FERROUS SULFATE TAB 325 MG (65 MG ELEMENTAL FE) 1 EACH: 325 (65 FE) TAB at 08:07

## 2024-07-06 RX ADMIN — PANTOPRAZOLE SODIUM 40 MG: 40 INJECTION, POWDER, LYOPHILIZED, FOR SOLUTION INTRAVENOUS at 08:07

## 2024-07-06 RX ADMIN — CEFTAZIDIME, AVIBACTAM 2.5 G: 2; .5 POWDER, FOR SOLUTION INTRAVENOUS at 05:07

## 2024-07-06 RX ADMIN — GABAPENTIN 300 MG: 300 CAPSULE ORAL at 08:07

## 2024-07-06 RX ADMIN — GABAPENTIN 300 MG: 300 CAPSULE ORAL at 09:07

## 2024-07-06 RX ADMIN — ISOSORBIDE DINITRATE 10 MG: 10 TABLET ORAL at 09:07

## 2024-07-06 RX ADMIN — Medication 10 ML: at 09:07

## 2024-07-06 NOTE — ASSESSMENT & PLAN NOTE
- Consulted ID, appreciate rec's  - IV abx ordered---continue vancomycin and Avycaz.   - Blood cultures from 6/26 with no growth to date  - Bone culture grew Pseudomonas and MRSA  -Appreciate IDs plan below:  -will need 6 weeks of therapy  -while outpatient will need weekly cbc, cmp, crp ,and vanco trough faxed to [phone] (or can be monitored at nursing home  - Appreciate Podiatry assistance with I&D

## 2024-07-06 NOTE — ASSESSMENT & PLAN NOTE
Patient's anemia is currently uncontrolled. Has 2 u PRBC ordered. Etiology likely d/t Iron deficiency.   Current CBC reviewed-   Lab Results   Component Value Date    HGB 7.9 (L) 07/06/2024    HCT 25.0 (L) 07/06/2024     Monitor serial CBC and transfuse if patient becomes hemodynamically unstable, symptomatic or H/H drops below 7/21.  -attempted blood transfusion yesterday however patient developed chest transfusion was stopped.  Will order 1 pack of packed red blood cells this morning and premedicate prior.  Orders for p.r.n. Lasix to be given in between units.  PPI BID  --consulted GI.  --s/p endoscopy on 6/28:  Impression:            - Normal esophagus.                          - Erosive gastropathy with no stigmata of recent                          bleeding. Biopsied.                          - Normal examined duodenum.   Recommendation:        - [facility] to [facility] for [facility] care.                          - Resume previous diet.                          - Continue present medications.                          - Await pathology results.                          - Observe patient's clinical course.                          - Perform a colonoscopy at appointment to be                          scheduled.

## 2024-07-06 NOTE — ASSESSMENT & PLAN NOTE
Patient with trial fibrillation which is controlled currently with Calcium Channel Blocker. Patient is currently in atrial fibrillation.[id number] Score: 4. . Anticoagulation done with Eliquis   Mercedes Flap Text: The defect edges were debeveled with a #15 scalpel blade.  Given the location of the defect, shape of the defect and the proximity to free margins a Mercedes flap was deemed most appropriate.  Using a sterile surgical marker, an appropriate advancement flap was drawn incorporating the defect and placing the expected incisions within the relaxed skin tension lines where possible. The area thus outlined was incised deep to adipose tissue with a #15 scalpel blade.  The skin margins were undermined to an appropriate distance in all directions utilizing iris scissors.

## 2024-07-06 NOTE — SUBJECTIVE & OBJECTIVE
Interval History: Nursing notes reviewed and no acute events overnight. VSS. Pt w/ no c/o. Hg 7.9 today, will continue to monitor. Cultures show MRSA and Pseudomonas. Continuing Avycaz and Vanc. Waiting for LTAC placement.    Review of Systems   Constitutional:  Positive for activity change, appetite change and fatigue. Negative for fever.   HENT:  Negative for trouble swallowing.    Respiratory:  Negative for cough.    Cardiovascular:  Negative for chest pain.   Gastrointestinal:  Negative for diarrhea, nausea and vomiting.   Genitourinary:  Negative for hematuria.   Musculoskeletal:  Positive for arthralgias and myalgias.   Skin:  Positive for wound.   Neurological:  Positive for weakness.   Psychiatric/Behavioral:  Negative for confusion.      Objective:     Vital Signs (Most Recent):  Temp: 98.2 °F (36.8 °C) (07/06/24 1113)  Pulse: 79 (07/06/24 1113)  Resp: 20 (07/06/24 1113)  BP: (!) 102/53 (07/06/24 1113)  SpO2: 96 % (07/06/24 1113) Vital Signs (24h Range):  Temp:  [98.2 °F (36.8 °C)-98.9 °F (37.2 °C)] 98.2 °F (36.8 °C)  Pulse:  [76-83] 79  Resp:  [16-20] 20  SpO2:  [96 %-98 %] 96 %  BP: (102-140)/(53-64) 102/53     Weight: 87.5 kg (192 lb 14.4 oz)  Body mass index is 28.49 kg/m².    Intake/Output Summary (Last 24 hours) at 7/6/2024 1511  Last data filed at 7/6/2024 1308  Gross per 24 hour   Intake 1374.68 ml   Output 1650 ml   Net -275.32 ml         Physical Exam  Vitals and nursing note reviewed.   Constitutional:       Appearance: He is obese.   HENT:      Head: Normocephalic and atraumatic.      Mouth/Throat:      Mouth: Mucous membranes are moist.   Eyes:      Extraocular Movements: Extraocular movements intact.   Cardiovascular:      Rate and Rhythm: Normal rate.      Pulses: Normal pulses.      Heart sounds: Normal heart sounds.   Pulmonary:      Effort: Pulmonary effort is normal.      Breath sounds: Normal breath sounds.   Abdominal:      General: Bowel sounds are normal. There is no distension.       Palpations: Abdomen is soft.      Tenderness: There is no abdominal tenderness. There is no guarding.   Musculoskeletal:      Cervical back: Normal range of motion and neck supple.      Comments: Left AKA   Skin:     General: Skin is warm and dry.      Capillary Refill: Capillary refill takes 2 to 3 seconds.   Neurological:      Mental Status: He is alert and oriented to person, place, and time. Mental status is at baseline.      Motor: No weakness.   Psychiatric:         Mood and Affect: Mood normal.         Behavior: Behavior normal.             Significant Labs: All pertinent labs within the past 24 hours have been reviewed.    Significant Imaging: I have reviewed all pertinent imaging results/findings within the past 24 hours.

## 2024-07-06 NOTE — PLAN OF CARE
Nutrition Plan of Care:    Recommendations     Recommendation:  1. Add Kvng and beneprotein BID to promote wound healing.   2. Encourage intake at meals as tolerated. 3. Monitor weight/labs.   4. RD to continue to follow to monitor po intake     Goals:  Pt will tolerate diet with at least 75% intake at meals by RD follow up  Nutrition Goal Status: new  Communication of RD Recs: reviewed with RN (Malu)     Assessment and Plan  Nutrition Problem  Increased protein needs     Related to (etiology):   Wound healing     Signs and Symptoms (as evidenced by):   R toe, R heel, B buttocks, L ischial wounds      Interventions:  Collaboration with other providers  Increased protein diet     Nutrition Diagnosis Status:   Continues       Malnutrition Assessment  Weight Loss (Malnutrition):  (23% x 6 months)   Orbital Region (Subcutaneous Fat Loss): well nourished  Upper Arm Region (Subcutaneous Fat Loss): well nourished  Thoracic and Lumbar Region: well nourished   [place] Region (Muscle Loss): well nourished  Clavicle Bone Region (Muscle Loss): well nourished  Clavicle and Acromion Bone Region (Muscle Loss): well nourished  Scapular Bone Region (Muscle Loss): well nourished  Dorsal Hand (Muscle Loss): well nourished  Patellar Region (Muscle Loss): well nourished  Anterior Thigh Region (Muscle Loss): well nourished  Posterior Calf Region (Muscle Loss): well nourished     Tawanna Garg, MS, RDN, LDN

## 2024-07-06 NOTE — PROGRESS NOTES
[facility] - Med Surg  Adult Nutrition  Progress Note    SUMMARY       Recommendations    Recommendation:  1. Add Kvng and beneprotein BID to promote wound healing.   2. Encourage intake at meals as tolerated. 3. Monitor weight/labs.   4. RD to continue to follow to monitor po intake    Goals:  Pt will tolerate diet with at least 75% intake at meals by RD follow up  Nutrition Goal Status: new  Communication of RD Recs: reviewed with RN (Malu)    Assessment and Plan  Nutrition Problem  Increased protein needs    Related to (etiology):   Wound healing    Signs and Symptoms (as evidenced by):   R toe, R heel, B buttocks, L ischial wounds     Interventions:  Collaboration with other providers  Increased protein diet    Nutrition Diagnosis Status:   Continues      Malnutrition Assessment  Weight Loss (Malnutrition):  (23% x 6 months)   Orbital Region (Subcutaneous Fat Loss): well nourished  Upper Arm Region (Subcutaneous Fat Loss): well nourished  Thoracic and Lumbar Region: well nourished   [place] Region (Muscle Loss): well nourished  Clavicle Bone Region (Muscle Loss): well nourished  Clavicle and Acromion Bone Region (Muscle Loss): well nourished  Scapular Bone Region (Muscle Loss): well nourished  Dorsal Hand (Muscle Loss): well nourished  Patellar Region (Muscle Loss): well nourished  Anterior Thigh Region (Muscle Loss): well nourished  Posterior Calf Region (Muscle Loss): well nourished     Reason for Assessment  Reason For Assessment: RD follow-up  Diagnosis:  (anemia)  Patient Active Problem List   Diagnosis    Tinea pedis    Wound, open, foot with complication    Type 2 diabetes mellitus with diabetic neuropathy, unspecified    Essential hypertension    Type 2 diabetes mellitus, with long-term current use of insulin    Cataract cortical, senile    Anemia    Asymptomatic Mobitz (type) I (Wenckebach's) atrioventricular block    Peripheral arterial disease    PVD (peripheral vascular disease)    Morbid obesity    Leg  "swelling    Chronic deep vein thrombosis (DVT) of right upper extremity    Nonhealing ulcer of right lower leg with fat layer exposed    Hypergranulation    Osteomyelitis of right lower extremity    Paroxysmal atrial fibrillation    History of complete heart block    Sepsis    Blurred vision, left eye    Other hyperlipidemia    Stage 3b chronic kidney disease    History of amputation of left high mid foot     Long term current use of insulin    Chronic anticoagulation    Urinary retention    Sepsis secondary to UTI    Palliative care encounter    Altered mental status    Wounds, multiple    Pressure injury of ankle, unstageable-lateral    Pressure injury of right heel, stage 4    Pressure injury of buttock, stage 4       Relevant Medical History: arthritis, DM, HLD, osteomyelitis, HTN, pacemaker, L AKA      General Information Comments:     7/5/2024: Patient continues on a 2000 kcal ADA, cardiac oral diet with fair po intake noted between 50-75%.  No tolerance issues reported.  Labs reviewed.  NKFA.  LBM: 7/3/2024.  Wound healing supplements recommended by prior RD. Left AKA.    6/26/2024:Pt now on 2000 calorie ADA Cardiac diet. Noted % intake at meals. Reports fod at his NH is terrible. Noted 63lb weight loss x 6 months (includes AKA in March). Tomas 13- L buttocks, R heel, R buttocks wounds. s/p EGD 6/28. L AKA. NFPE completed 6/27-nourished    Nutrition Discharge Planning: pt to d/c on ADA Cardiac diet    Nutrition Risk Screen  Nutrition Risk Screen: no indicators present    Nutrition/Diet History  Food Preferences: no Hinduism or cultural food prefs identified  Spiritual, Cultural Beliefs, Scientologist Practices, Values that Affect Care: no  Factors Affecting Nutritional Intake: altered gastrointestinal function    Anthropometrics  Temp: 98.2 °F (36.8 °C)  Height Method: Stated  Height: 5' 9" (175.3 cm)  Height (inches): 69 in  Weight Method: Bed Scale  Weight: 87.5 kg (192 lb 14.4 oz)  Weight (lb): 192.9 " lb  Ideal Body Weight (IBW), Male: 160 lb  % Ideal Body Weight, Male (lb): 126.21 %  BMI (Calculated): 28.5  BMI Grade: 25 - 29.9 - overweight  Usual Body Weight (UBW), k.7 kg (12/15)  % Usual Body Weight: 76.68  % Weight Change From Usual Weight: -23.48 %  Amputation %: 16  Total Amputation %: 16  Amputation Ideal Body Weight (IBW), Male (lb): 144 lb  Amputee BMI (kg/m2): 34.01 kg/m2     Lab/Procedures/Meds  Pertinent Labs Reviewed: reviewed  BMP  Lab Results   Component Value Date     2024    K 3.5 2024     2024    CO2 30 (H) 2024    BUN 7 (L) 2024    CREATININE 0.7 2024    CALCIUM 8.4 (L) 2024    ANIONGAP 7 (L) 2024    EGFRNORACEVR >60 2024     Lab Results   Component Value Date    HGBA1C 6.8 (H) 2024     Lab Results   Component Value Date    CALCIUM 8.4 (L) 2024    PHOS 2.3 (L) 2024     Glucose   Date Value Ref Range Status   2024 149 (H) 70 - 110 mg/dL Final       Pertinent Medications Reviewed: reviewed  Pertinent Medications Comments: ferrous sulfate, gabapentin, pantoprazole, zosyn, senna  Scheduled Meds:   apixaban  5 mg Oral BID    aspirin  81 mg Oral Daily    ceftazidime-avibactam (AVYCAZ) IVPB  2.5 g Intravenous Q8H    ferrous sulfate  1 tablet Oral Daily    gabapentin  300 mg Oral BID    isosorbide dinitrate  10 mg Oral TID    LIDOcaine (PF) 10 mg/ml (1%)  1 mL Other Once    pantoprazole  40 mg Intravenous BID    senna-docusate 8.6-50 mg  1 tablet Oral BID    sodium chloride 0.9%  10 mL Intravenous Q8H    tamsulosin  0.4 mg Oral Daily    vancomycin (VANCOCIN) IV (PEDS and ADULTS)  1,250 mg Intravenous Q24H     Continuous Infusions:  PRN Meds:.  Current Facility-Administered Medications:     0.9%  NaCl infusion (for blood administration), , Intravenous, Q24H PRN    0.9%  NaCl infusion (for blood administration), , Intravenous, Q24H PRN    0.9%  NaCl infusion (for blood administration), , Intravenous, Q24H  PRN    acetaminophen, 650 mg, Oral, Q4H PRN    albuterol-ipratropium, 3 mL, Nebulization, Q4H PRN    aluminum-magnesium hydroxide-simethicone, 30 mL, Oral, QID PRN    dextrose 10%, 12.5 g, Intravenous, PRN    dextrose 10%, 25 g, Intravenous, PRN    diphenhydrAMINE, 25 mg, Oral, Q6H PRN    furosemide (LASIX) injection, 20 mg, Intravenous, PRN    glucagon (human recombinant), 1 mg, Intramuscular, PRN    glucose, 16 g, Oral, PRN    glucose, 24 g, Oral, PRN    HYDROcodone-acetaminophen, 1 tablet, Oral, Q6H PRN    insulin aspart U-100, 0-5 Units, Subcutaneous, QID (AC + HS) PRN    melatonin, 6 mg, Oral, Nightly PRN    morphine, 1 mg, Intravenous, Q6H PRN    ondansetron, 8 mg, Oral, Q8H PRN    ondansetron, 4 mg, Intravenous, Q8H PRN    simethicone, 1 tablet, Oral, QID PRN    Pharmacy to dose Vancomycin consult, , , Once **AND** vancomycin - pharmacy to dose, , Intravenous, pharmacy to manage frequency    Estimated/Assessed Needs  Weight Used For Calorie Calculations: 72.7 kg (160 lb 4.4 oz) (IBW pre amputation)  Energy Calorie Requirements (kcal): 2137 (35 kcal/kg IBW with AKA)  Energy Need Method: Kcal/kg  Protein Requirements: 79g (1.3g/kg IBW with amputation)  Weight Used For Protein Calculations: 72.7 kg (160 lb 4.4 oz) (IBW pre amputation)  Estimated Fluid Requirement Method: RDA Method  RDA Method (mL): 2137  CHO Requirement: 225g    Nutrition Prescription Ordered  Current Diet Order: 2000 calorie ADA Cardiac    Evaluation of Received Nutrient/Fluid Intake  % Kcal Needs: 50-75%  % Protein Needs: 50-75%  I/O: 7/6/2024: -3.3L since admit  Energy Calories Required: meeting needs  Protein Required: meeting needs  Fluid Required: meeting needs  Comments: LBM: 7/3/2024  Tolerance: tolerating  % Intake of Estimated Energy Needs: 75 - 100 %  % Meal Intake: 75 - 100 %    Nutrition Risk  Level of Risk/Frequency of Follow-up: moderate (follow up: 1-2x per week)     Monitor and Evaluation  Food and Nutrient Intake: food and  beverage intake  Food and Nutrient Adminstration: diet order  Physical Activity and Function: nutrition-related ADLs and IADLs  Anthropometric Measurements: weight  Biochemical Data, Medical Tests and Procedures: electrolyte and renal panel  Nutrition-Focused Physical Findings: overall appearance     Nutrition Related Social Determinants of Health: SDOH: Other: receives meals from NH     Nutrition Follow-Up  RD Follow-up?: Yes  Tawanna Garg, MS, RDN, LDN

## 2024-07-06 NOTE — NURSING
Pt Ox3, intermittently confused. Meds admin per MAR. Sams CDI and CHG bath given. Sams care completed. Pain controlled w/ PRN med. Wound care to buttock completed x 3 assist. Tolerated meals. Safety m/t.

## 2024-07-06 NOTE — PROGRESS NOTES
[facility]  Progress Note    Patient Name: Saji Castañeda  MRN: [id number]  Patient Class: IP- Inpatient   Admission Date: 6/26/2024  Length of Stay: 9 days  Attending Physician: Efrain Coleman MD  Primary Care Provider: No primary care provider on file.        Subjective:     Principal Problem:Anemia        HPI:  Saji Castañeda is a 75 y/o M who has a pmh of Arthritis, Bacteremia due to Gram-negative bacteria, Diabetes mellitus, Diabetes mellitus, type 2, Hyperlipidemia, Hypertension, Osteomyelitis, and Palliative care encounter. presenting to the Emergency Department for referral. Patient was sent to the ED via EMS from [facility] for low blood count. Patient denies any chest pain, shortness on breath, weakness, fatigue, dizziness. His hemoglobin was 6.3--will order 1 u PRBC ordered. FOBT pending. Will admit to the [facility] service for further care.    Overview/Hospital Course:  He was admitted to the hospital medicine service for further care.  Patient noted with anemia as well as osteomyelitis on admission.  Patient underwent a EGD on 06/28 which noted Erosive gastropathy.  Anticoagulation is on hold until cleared by GI.  He did receive 1 unit of packed red blood cells on admission.  CT of his right foot noted with osteomyelitis.  Appreciate Podiatry is evaluation---status post bone biopsy on today---also appreciate infectious disease.    Interval History: Nursing notes reviewed and no acute events overnight. VSS. Pt w/ no c/o. Hg 7.9 today, will continue to monitor. Cultures show MRSA and Pseudomonas. Continuing Avycaz and Vanc. Waiting for LTAC placement.    Review of Systems   Constitutional:  Positive for activity change, appetite change and fatigue. Negative for fever.   HENT:  Negative for trouble swallowing.    Respiratory:  Negative for cough.    Cardiovascular:  Negative for chest pain.   Gastrointestinal:  Negative for diarrhea, nausea and vomiting.    Genitourinary:  Negative for hematuria.   Musculoskeletal:  Positive for arthralgias and myalgias.   Skin:  Positive for wound.   Neurological:  Positive for weakness.   Psychiatric/Behavioral:  Negative for confusion.      Objective:     Vital Signs (Most Recent):  Temp: 98.2 °F (36.8 °C) (07/06/24 1113)  Pulse: 79 (07/06/24 1113)  Resp: 20 (07/06/24 1113)  BP: (!) 102/53 (07/06/24 1113)  SpO2: 96 % (07/06/24 1113) Vital Signs (24h Range):  Temp:  [98.2 °F (36.8 °C)-98.9 °F (37.2 °C)] 98.2 °F (36.8 °C)  Pulse:  [76-83] 79  Resp:  [16-20] 20  SpO2:  [96 %-98 %] 96 %  BP: (102-140)/(53-64) 102/53     Weight: 87.5 kg (192 lb 14.4 oz)  Body mass index is 28.49 kg/m².    Intake/Output Summary (Last 24 hours) at 7/6/2024 1511  Last data filed at 7/6/2024 1308  Gross per 24 hour   Intake 1374.68 ml   Output 1650 ml   Net -275.32 ml         Physical Exam  Vitals and nursing note reviewed.   Constitutional:       Appearance: He is obese.   HENT:      Head: Normocephalic and atraumatic.      Mouth/Throat:      Mouth: Mucous membranes are moist.   Eyes:      Extraocular Movements: Extraocular movements intact.   Cardiovascular:      Rate and Rhythm: Normal rate.      Pulses: Normal pulses.      Heart sounds: Normal heart sounds.   Pulmonary:      Effort: Pulmonary effort is normal.      Breath sounds: Normal breath sounds.   Abdominal:      General: Bowel sounds are normal. There is no distension.      Palpations: Abdomen is soft.      Tenderness: There is no abdominal tenderness. There is no guarding.   Musculoskeletal:      Cervical back: Normal range of motion and neck supple.      Comments: Left AKA   Skin:     General: Skin is warm and dry.      Capillary Refill: Capillary refill takes 2 to 3 seconds.   Neurological:      Mental Status: He is alert and oriented to person, place, and time. Mental status is at baseline.      Motor: No weakness.   Psychiatric:         Mood and Affect: Mood normal.         Behavior:  Behavior normal.             Significant Labs: All pertinent labs within the past 24 hours have been reviewed.    Significant Imaging: I have reviewed all pertinent imaging results/findings within the past 24 hours.    Assessment/Plan:      * Anemia  Patient's anemia is currently uncontrolled. Has 2 u PRBC ordered. Etiology likely d/t Iron deficiency.   Current CBC reviewed-   Lab Results   Component Value Date    HGB 7.9 (L) 07/06/2024    HCT 25.0 (L) 07/06/2024     Monitor serial CBC and transfuse if patient becomes hemodynamically unstable, symptomatic or H/H drops below 7/21.  -attempted blood transfusion yesterday however patient developed chest transfusion was stopped.  Will order 1 pack of packed red blood cells this morning and premedicate prior.  Orders for p.r.n. Lasix to be given in between units.  PPI BID  --consulted GI.  --s/p endoscopy on 6/28:  Impression:            - Normal esophagus.                          - Erosive gastropathy with no stigmata of recent                          bleeding. Biopsied.                          - Normal examined duodenum.   Recommendation:        - Return patient to hospital [facility] for ongoing care.                          - Resume previous diet.                          - Continue present medications.                          - Await pathology results.                          - Observe patient's clinical course.                          - Perform a colonoscopy at appointment to be                          scheduled.     Pressure injury of buttock, stage 4        Pressure injury of right heel, stage 4        Pressure injury of ankle, unstageable-lateral  Pressure injury of buttock, stage 4   Pressure injury of right heel, stage 4     - Appreciate IP wound care  - Pressure injury prevention interventions - waffle overlay and heel boot  - Vashe wet-to-dry to bilateral buttocks, R buttock, and R heel wound BID  - Mepilex border to L posterior thigh/gluteal fold  3x/week  - Podiatry consulted for R heel wound   --also noted with a bilateral buttock full thickness pressure injury  --Right heel and sacrum CT ordered to r/o OM  --appreciate cardiology's evaluation      Wounds, multiple  -patient with wounds to his sacrum and heel  -wound care consulted  -Ulcer care ordered      Chronic anticoagulation  Continue home OAC      Stage 3b chronic kidney disease  Creatine stable for now. BMP reviewed- noted Estimated Creatinine Clearance: 99.8 mL/min (based on SCr of 0.7 mg/dL). according to latest data. Based on current GFR, CKD stage is stage 2 - GFR 60-89.  Monitor UOP and serial BMP and adjust therapy as needed. Renally dose meds. Avoid nephrotoxic medications and procedures.    Paroxysmal atrial fibrillation  Patient with trial fibrillation which is controlled currently with Calcium Channel Blocker. Patient is currently in atrial fibrillation.[id number] Score: 4. . Anticoagulation done with Eliquis    Osteomyelitis of right lower extremity  - Consulted ID, appreciate rec's  - IV abx ordered---continue vancomycin and Avycaz.   - Blood cultures from 6/26 with no growth to date  - Bone culture grew Pseudomonas and MRSA  -Appreciate IDs plan below:  -will need 6 weeks of therapy  -while outpatient will need weekly cbc, cmp, crp ,and vanco trough faxed to [phone] (or can be monitored at nursing home  - Appreciate Podiatry assistance with I&D        Chronic deep vein thrombosis (DVT) of right upper extremity  -home AC resumed  -follow      PVD (peripheral vascular disease)        Peripheral arterial disease  PVD (peripheral vascular disease)    -continue OAC and ASA      Type 2 diabetes mellitus, with long-term current use of insulin    Patient's FSGs are uncontrolled due to hyperglycemia on current medication regimen.  Last A1c reviewed-   Lab Results   Component Value Date    HGBA1C 6.8 (H) 06/26/2024     Most recent fingerstick glucose reviewed-   Recent Labs   Lab  07/02/24 2055 07/03/24  0601 07/03/24  1112 07/03/24  1556   POCTGLUCOSE 224* 170* 161* 188*       Current correctional scale  Low  Maintain anti-hyperglycemic dose as follows-   Antihyperglycemics (From admission, onward)      Start     Stop Route Frequency Ordered    06/26/24 1552  insulin aspart U-100 pen 0-5 Units         -- SubQ Before meals & nightly PRN 06/26/24 1453          Hold Oral hypoglycemics while patient is in the hospital.      Essential hypertension  Chronic, uncontrolled. Latest blood pressure and vitals reviewed-     Temp:  [98.3 °F (36.8 °C)-98.6 °F (37 °C)]   Pulse:  [45-88]   Resp:  [16-20]   BP: (109-157)/(55-72)   SpO2:  [95 %-99 %] .   Home meds for hypertension were reviewed and noted below.   Hypertension Medications               furosemide (LASIX) 20 MG tablet Take 1 tablet (20 mg total) by mouth once daily.    NIFEdipine (PROCARDIA-XL) 90 MG (OSM) 24 hr tablet Take 1 tablet (90 mg total) by mouth once daily.    bumetanide (BUMEX) 0.5 MG Tab Take 0.5 mg by mouth daily as needed.    bumetanide (BUMEX) 1 MG tablet Take by mouth. unknown    hydrALAZINE (APRESOLINE) 100 MG tablet Take by mouth. unknown    isosorbide dinitrate (ISORDIL) 10 MG tablet Take 1 tablet (10 mg total) by mouth 3 (three) times daily. Hold until follow up with a provider            While in the hospital, will manage blood pressure as follows; will resume meds as tolerated     Will utilize p.r.n. blood pressure medication only if patient's blood pressure greater than 160/100 and he develops symptoms such as worsening chest pain or shortness of breath.      VTE Risk Mitigation (From admission, onward)           Ordered     apixaban tablet 5 mg  2 times daily         07/01/24 2139     Reason for No Pharmacological VTE Prophylaxis  Once        Question:  Reasons:  Answer:  Risk of Bleeding    06/26/24 1453     IP VTE HIGH RISK PATIENT  Once         06/26/24 1453     Place sequential compression device  Until discontinued          06/26/24 1453                    Discharge Planning   OXANA: 7/8/2024     Code Status: Full Code   Is the patient medically ready for discharge?:     Reason for patient still in hospital (select all that apply): Treatment and Pending disposition  Discharge Plan A: Long-term acute care facility (LTAC)   Discharge Delays: None known at this time              Ervin Valdovinos PA-C  Department of Hospital Medicine   [facility]

## 2024-07-06 NOTE — PLAN OF CARE
Problem: Adult Inpatient Plan of Care  Goal: Plan of Care Review  Outcome: Progressing     Problem: Gastrointestinal Bleeding  Goal: Optimal Coping with Acute Illness  Outcome: Progressing     Problem: Diabetes Comorbidity  Goal: Blood Glucose Level Within Targeted Range  Outcome: Progressing     Problem: Wound  Goal: Optimal Coping  Outcome: Progressing     Problem: Anemia  Goal: Anemia Symptom Improvement  Outcome: Progressing

## 2024-07-07 LAB
ALBUMIN SERPL BCP-MCNC: 1.5 G/DL (ref 3.5–5.2)
ALP SERPL-CCNC: 56 U/L (ref 55–135)
ALT SERPL W/O P-5'-P-CCNC: <5 U/L (ref 10–44)
ANION GAP SERPL CALC-SCNC: 5 MMOL/L (ref 8–16)
AST SERPL-CCNC: 10 U/L (ref 10–40)
BASOPHILS # BLD AUTO: 0.05 K/UL (ref 0–0.2)
BASOPHILS NFR BLD: 0.6 % (ref 0–1.9)
BILIRUB SERPL-MCNC: 0.2 MG/DL (ref 0.1–1)
BUN SERPL-MCNC: 6 MG/DL (ref 8–23)
CALCIUM SERPL-MCNC: 8.6 MG/DL (ref 8.7–10.5)
CHLORIDE SERPL-SCNC: 101 MMOL/L (ref 95–110)
CO2 SERPL-SCNC: 32 MMOL/L (ref 23–29)
CREAT SERPL-MCNC: 0.7 MG/DL (ref 0.5–1.4)
DIFFERENTIAL METHOD BLD: ABNORMAL
EOSINOPHIL # BLD AUTO: 0.9 K/UL (ref 0–0.5)
EOSINOPHIL NFR BLD: 10.1 % (ref 0–8)
ERYTHROCYTE [DISTWIDTH] IN BLOOD BY AUTOMATED COUNT: 18 % (ref 11.5–14.5)
EST. GFR  (NO RACE VARIABLE): >60 ML/MIN/1.73 M^2
GLUCOSE SERPL-MCNC: 148 MG/DL (ref 70–110)
HCT VFR BLD AUTO: 25.3 % (ref 40–54)
HGB BLD-MCNC: 7.9 G/DL (ref 14–18)
IMM GRANULOCYTES # BLD AUTO: 0.04 K/UL (ref 0–0.04)
IMM GRANULOCYTES NFR BLD AUTO: 0.5 % (ref 0–0.5)
LYMPHOCYTES # BLD AUTO: 1.7 K/UL (ref 1–4.8)
LYMPHOCYTES NFR BLD: 19.9 % (ref 18–48)
MCH RBC QN AUTO: 25.9 PG (ref 27–31)
MCHC RBC AUTO-ENTMCNC: 31.2 G/DL (ref 32–36)
MCV RBC AUTO: 83 FL (ref 82–98)
MONOCYTES # BLD AUTO: 0.7 K/UL (ref 0.3–1)
MONOCYTES NFR BLD: 8.5 % (ref 4–15)
NEUTROPHILS # BLD AUTO: 5.2 K/UL (ref 1.8–7.7)
NEUTROPHILS NFR BLD: 60.4 % (ref 38–73)
NRBC BLD-RTO: 0 /100 WBC
PLATELET # BLD AUTO: 438 K/UL (ref 150–450)
PMV BLD AUTO: 8.2 FL (ref 9.2–12.9)
POCT GLUCOSE: 138 MG/DL (ref 70–110)
POCT GLUCOSE: 191 MG/DL (ref 70–110)
POCT GLUCOSE: 193 MG/DL (ref 70–110)
POCT GLUCOSE: 209 MG/DL (ref 70–110)
POTASSIUM SERPL-SCNC: 3.4 MMOL/L (ref 3.5–5.1)
PROT SERPL-MCNC: 5.9 G/DL (ref 6–8.4)
RBC # BLD AUTO: 3.05 M/UL (ref 4.6–6.2)
SODIUM SERPL-SCNC: 138 MMOL/L (ref 136–145)
WBC # BLD AUTO: 8.54 K/UL (ref 3.9–12.7)

## 2024-07-07 PROCEDURE — 63600175 PHARM REV CODE 636 W HCPCS: Mod: JZ,JG

## 2024-07-07 PROCEDURE — 94761 N-INVAS EAR/PLS OXIMETRY MLT: CPT

## 2024-07-07 PROCEDURE — A4216 STERILE WATER/SALINE, 10 ML: HCPCS | Performed by: NURSE PRACTITIONER

## 2024-07-07 PROCEDURE — 25000003 PHARM REV CODE 250

## 2024-07-07 PROCEDURE — 85025 COMPLETE CBC W/AUTO DIFF WBC: CPT | Performed by: NURSE PRACTITIONER

## 2024-07-07 PROCEDURE — 63600175 PHARM REV CODE 636 W HCPCS: Performed by: NURSE PRACTITIONER

## 2024-07-07 PROCEDURE — 21400001 HC TELEMETRY ROOM

## 2024-07-07 PROCEDURE — 25000003 PHARM REV CODE 250: Performed by: NURSE PRACTITIONER

## 2024-07-07 PROCEDURE — 99900035 HC TECH TIME PER 15 MIN (STAT)

## 2024-07-07 PROCEDURE — 25000003 PHARM REV CODE 250: Performed by: HOSPITALIST

## 2024-07-07 PROCEDURE — 80053 COMPREHEN METABOLIC PANEL: CPT

## 2024-07-07 PROCEDURE — 63600175 PHARM REV CODE 636 W HCPCS: Performed by: HOSPITALIST

## 2024-07-07 PROCEDURE — 27000207 HC ISOLATION

## 2024-07-07 RX ORDER — POTASSIUM CHLORIDE 20 MEQ/1
40 TABLET, EXTENDED RELEASE ORAL ONCE
Status: COMPLETED | OUTPATIENT
Start: 2024-07-07 | End: 2024-07-07

## 2024-07-07 RX ADMIN — CEFTAZIDIME, AVIBACTAM 2.5 G: 2; .5 POWDER, FOR SOLUTION INTRAVENOUS at 03:07

## 2024-07-07 RX ADMIN — GABAPENTIN 300 MG: 300 CAPSULE ORAL at 09:07

## 2024-07-07 RX ADMIN — HYDROCODONE BITARTRATE AND ACETAMINOPHEN 1 TABLET: 10; 325 TABLET ORAL at 10:07

## 2024-07-07 RX ADMIN — MORPHINE SULFATE 1 MG: 2 INJECTION, SOLUTION INTRAMUSCULAR; INTRAVENOUS at 05:07

## 2024-07-07 RX ADMIN — ISOSORBIDE DINITRATE 10 MG: 10 TABLET ORAL at 03:07

## 2024-07-07 RX ADMIN — PANTOPRAZOLE SODIUM 40 MG: 40 INJECTION, POWDER, LYOPHILIZED, FOR SOLUTION INTRAVENOUS at 08:07

## 2024-07-07 RX ADMIN — TAMSULOSIN HYDROCHLORIDE 0.4 MG: 0.4 CAPSULE ORAL at 08:07

## 2024-07-07 RX ADMIN — VANCOMYCIN HYDROCHLORIDE 1250 MG: 1.25 INJECTION, POWDER, LYOPHILIZED, FOR SOLUTION INTRAVENOUS at 05:07

## 2024-07-07 RX ADMIN — CEFTAZIDIME, AVIBACTAM 2.5 G: 2; .5 POWDER, FOR SOLUTION INTRAVENOUS at 05:07

## 2024-07-07 RX ADMIN — GABAPENTIN 300 MG: 300 CAPSULE ORAL at 08:07

## 2024-07-07 RX ADMIN — DOCUSATE SODIUM AND SENNOSIDES 1 TABLET: 8.6; 5 TABLET, FILM COATED ORAL at 08:07

## 2024-07-07 RX ADMIN — ISOSORBIDE DINITRATE 10 MG: 10 TABLET ORAL at 09:07

## 2024-07-07 RX ADMIN — FERROUS SULFATE TAB 325 MG (65 MG ELEMENTAL FE) 1 EACH: 325 (65 FE) TAB at 08:07

## 2024-07-07 RX ADMIN — ISOSORBIDE DINITRATE 10 MG: 10 TABLET ORAL at 08:07

## 2024-07-07 RX ADMIN — APIXABAN 5 MG: 5 TABLET, FILM COATED ORAL at 08:07

## 2024-07-07 RX ADMIN — Medication 10 ML: at 05:07

## 2024-07-07 RX ADMIN — CEFTAZIDIME, AVIBACTAM 2.5 G: 2; .5 POWDER, FOR SOLUTION INTRAVENOUS at 09:07

## 2024-07-07 RX ADMIN — INSULIN ASPART 1 UNITS: 100 INJECTION, SOLUTION INTRAVENOUS; SUBCUTANEOUS at 09:07

## 2024-07-07 RX ADMIN — Medication 10 ML: at 09:07

## 2024-07-07 RX ADMIN — ASPIRIN 81 MG: 81 TABLET, COATED ORAL at 08:07

## 2024-07-07 RX ADMIN — PANTOPRAZOLE SODIUM 40 MG: 40 INJECTION, POWDER, LYOPHILIZED, FOR SOLUTION INTRAVENOUS at 09:07

## 2024-07-07 RX ADMIN — APIXABAN 5 MG: 5 TABLET, FILM COATED ORAL at 09:07

## 2024-07-07 RX ADMIN — POTASSIUM CHLORIDE 40 MEQ: 1500 TABLET, EXTENDED RELEASE ORAL at 05:07

## 2024-07-07 NOTE — NURSING
Pt safety maintained. Pt on RA, continuous cardiac monitoring. Medication administered per MAR. Sams maintained to gravity. Contact precautions observed. Dressing change performed per orders. Z-flex boot to right heel. HOB elevated. Pt instructed to call w/any needs, verbalized understanding. Bed in lowest position, locked and bed alarms on. Call light w/in pt's reach.

## 2024-07-07 NOTE — ASSESSMENT & PLAN NOTE
Patient's anemia is currently uncontrolled. Has 2 u PRBC ordered. Etiology likely d/t Iron deficiency.   Current CBC reviewed-   Lab Results   Component Value Date    HGB 7.9 (L) 07/07/2024    HCT 25.3 (L) 07/07/2024     Monitor serial CBC and transfuse if patient becomes hemodynamically unstable, symptomatic or H/H drops below 7/21.  -attempted blood transfusion yesterday however patient developed chest transfusion was stopped.  Will order 1 pack of packed red blood cells this morning and premedicate prior.  Orders for p.r.n. Lasix to be given in between units.  PPI BID  --consulted GI.  --s/p endoscopy on 6/28:  Impression:            - Normal esophagus.                          - Erosive gastropathy with no stigmata of recent                          bleeding. Biopsied.                          - Normal examined duodenum.   Recommendation:        - Return patient to [facility] for ongoing care.                          - Resume previous diet.                          - Continue present medications.                          - Await pathology results.                          - Observe patient's clinical course.                          - Perform a colonoscopy at appointment to be                          scheduled.

## 2024-07-07 NOTE — NURSING
"This RN at bedside. Introduced self to pt. Attempted to turn him, Pt became agitated, stated,"Uh huh! Get away from here Tonya may, I am comfortable. " Explained to pt that we need to turn him every 2 hours to prevent skin pressure injuries. Pt received education on the importance of weight shifting. Pt states, "Oh no, you not! It is every four hours." Pt reminded that it is ordered every two hours. Pt still refused to be turned.   "

## 2024-07-07 NOTE — NURSING
"This RN at bedside to turn pt. Pt states, "No, no, it aint been four hours." This RN explained to pt, that it has been two hours since his last turn, and reminded that even thought he insists to only be turned every four hours, we are still required to encourage him to turn every two hours. Pt verbalized understanding, but still refused. This RN reminded pt that will return in two hours to turn him.   "

## 2024-07-07 NOTE — ASSESSMENT & PLAN NOTE
Patient has hypokalemia which is Acute and currently uncontrolled. Most recent potassium levels reviewed-   Lab Results   Component Value Date    K 3.4 (L) 07/07/2024   . Will continue potassium replacement per protocol and recheck repeat levels after replacement completed.

## 2024-07-07 NOTE — ASSESSMENT & PLAN NOTE
Chronic, uncontrolled. Latest blood pressure and vitals reviewed-     Temp:  [98.1 °F (36.7 °C)-99.2 °F (37.3 °C)]   Pulse:  [72-81]   Resp:  [16-20]   BP: (103-158)/(57-71)   SpO2:  [96 %-98 %] .   Home meds for hypertension were reviewed and noted below.   Hypertension Medications               furosemide (LASIX) 20 MG tablet Take 1 tablet (20 mg total) by mouth once daily.    NIFEdipine (PROCARDIA-XL) 90 MG (OSM) 24 hr tablet Take 1 tablet (90 mg total) by mouth once daily.    bumetanide (BUMEX) 0.5 MG Tab Take 0.5 mg by mouth daily as needed.    bumetanide (BUMEX) 1 MG tablet Take by mouth. unknown    hydrALAZINE (APRESOLINE) 100 MG tablet Take by mouth. unknown    isosorbide dinitrate (ISORDIL) 10 MG tablet Take 1 tablet (10 mg total) by mouth 3 (three) times daily. Hold until follow up with a provider            While in the hospital, will manage blood pressure as follows; will resume meds as tolerated     Will utilize p.r.n. blood pressure medication only if patient's blood pressure greater than 160/100 and he develops symptoms such as worsening chest pain or shortness of breath.

## 2024-07-07 NOTE — SUBJECTIVE & OBJECTIVE
Interval History: Nursing notes reviewed and no acute events overnight. VSS. Pt w/ no c/o. Hg 7.9 today, will continue to monitor. K marginally low today, will replete. Bone Cultures show MRSA and Pseudomonas. Continuing Avycaz and Vanc. Waiting for LTAC placement tomorrow.    Review of Systems   Constitutional:  Positive for activity change, appetite change and fatigue. Negative for fever.   HENT:  Negative for trouble swallowing.    Respiratory:  Negative for cough.    Cardiovascular:  Negative for chest pain.   Gastrointestinal:  Negative for diarrhea, nausea and vomiting.   Genitourinary:  Negative for hematuria.   Musculoskeletal:  Positive for arthralgias and myalgias.   Skin:  Positive for wound.   Neurological:  Positive for weakness.   Psychiatric/Behavioral:  Negative for confusion.      Objective:     Vital Signs (Most Recent):  Temp: 98.1 °F (36.7 °C) (07/07/24 1114)  Pulse: 81 (07/07/24 1208)  Resp: 20 (07/07/24 1114)  BP: 138/60 (07/07/24 1114)  SpO2: 96 % (07/07/24 1114) Vital Signs (24h Range):  Temp:  [98.1 °F (36.7 °C)-99.2 °F (37.3 °C)] 98.1 °F (36.7 °C)  Pulse:  [72-81] 81  Resp:  [16-20] 20  SpO2:  [96 %-98 %] 96 %  BP: (103-158)/(57-71) 138/60     Weight: 87.5 kg (192 lb 14.4 oz)  Body mass index is 28.49 kg/m².    Intake/Output Summary (Last 24 hours) at 7/7/2024 1555  Last data filed at 7/7/2024 1128  Gross per 24 hour   Intake 550.43 ml   Output 3200 ml   Net -2649.57 ml         Physical Exam  Vitals and nursing note reviewed.   HENT:      Head: Normocephalic and atraumatic.      Mouth/Throat:      Mouth: Mucous membranes are moist.   Eyes:      Extraocular Movements: Extraocular movements intact.   Cardiovascular:      Rate and Rhythm: Normal rate.      Pulses: Normal pulses.      Heart sounds: Normal heart sounds.   Pulmonary:      Effort: Pulmonary effort is normal.      Breath sounds: Normal breath sounds.   Abdominal:      General: Bowel sounds are normal. There is no distension.       Palpations: Abdomen is soft.      Tenderness: There is no abdominal tenderness. There is no guarding.   Musculoskeletal:      Cervical back: Normal range of motion and neck supple.      Comments: Left AKA   Skin:     General: Skin is warm and dry.      Capillary Refill: Capillary refill takes 2 to 3 seconds.   Neurological:      Mental Status: He is alert and oriented to person, place, and time. Mental status is at baseline.      Motor: No weakness.   Psychiatric:         Mood and Affect: Mood normal.         Behavior: Behavior normal.             Significant Labs: All pertinent labs within the past 24 hours have been reviewed.    Significant Imaging: I have reviewed all pertinent imaging results/findings within the past 24 hours.

## 2024-07-07 NOTE — ASSESSMENT & PLAN NOTE
Patient with trial fibrillation which is controlled currently with Calcium Channel Blocker. Patient is currently in atrial fibrillation.[id number] Score: 4. . Anticoagulation done with Eliquis

## 2024-07-07 NOTE — PLAN OF CARE
Problem: Adult Inpatient Plan of Care  Goal: Plan of Care Review  Outcome: Progressing  Goal: Patient-Specific Goal (Individualized)  Outcome: Progressing  Goal: Optimal Comfort and Wellbeing  Outcome: Progressing     Problem: Diabetes Comorbidity  Goal: Blood Glucose Level Within Targeted Range  Outcome: Progressing     Problem: Wound  Goal: Optimal Pain Control and Function  Outcome: Progressing  Goal: Skin Health and Integrity  Outcome: Progressing  Goal: Optimal Wound Healing  Outcome: Progressing     Problem: Anemia  Goal: Anemia Symptom Improvement  Outcome: Progressing     Problem: Fall Injury Risk  Goal: Absence of Fall and Fall-Related Injury  Outcome: Progressing

## 2024-07-07 NOTE — PROGRESS NOTES
[facility]  Progress Note    Patient Name: Saji Castañeda  MRN: [id number]  Patient Class: IP- Inpatient   Admission Date: 6/26/2024  Length of Stay: 10 days  Attending Physician: Efrain Coleman MD  Primary Care Provider: No primary care provider on file.        Subjective:     Principal Problem:Anemia        HPI:  Saji Castañeda is a 75 y/o M who has a pmh of Arthritis, Bacteremia due to Gram-negative bacteria, Diabetes mellitus, Diabetes mellitus, type 2, Hyperlipidemia, Hypertension, Osteomyelitis, and Palliative care encounter. presenting to the Emergency Department for referral. Patient was sent to the ED via EMS from [facility] for low blood count. Patient denies any chest pain, shortness on breath, weakness, fatigue, dizziness. His hemoglobin was 6.3--will order 1 u PRBC ordered. FOBT pending. Will admit to the [facility] service for further care.    Overview/Hospital Course:  He was admitted to the hospital medicine service for further care.  Patient noted with anemia as well as osteomyelitis on admission.  Patient underwent a EGD on 06/28 which noted Erosive gastropathy.  Anticoagulation is on hold until cleared by GI.  He did receive 1 unit of packed red blood cells on admission.  CT of his right foot noted with osteomyelitis.  Appreciate Podiatry is evaluation---status post bone biopsy on today---also appreciate infectious disease.    Interval History: Nursing notes reviewed and no acute events overnight. VSS. Pt w/ no c/o. Hg 7.9 today, will continue to monitor. K marginally low today, will replete. Bone Cultures show MRSA and Pseudomonas. Continuing Avycaz and Vanc. Waiting for LTAC placement tomorrow.    Review of Systems   Constitutional:  Positive for activity change, appetite change and fatigue. Negative for fever.   HENT:  Negative for trouble swallowing.    Respiratory:  Negative for cough.    Cardiovascular:  Negative for chest pain.   Gastrointestinal:   Negative for diarrhea, nausea and vomiting.   Genitourinary:  Negative for hematuria.   Musculoskeletal:  Positive for arthralgias and myalgias.   Skin:  Positive for wound.   Neurological:  Positive for weakness.   Psychiatric/Behavioral:  Negative for confusion.      Objective:     Vital Signs (Most Recent):  Temp: 98.1 °F (36.7 °C) (07/07/24 1114)  Pulse: 81 (07/07/24 1208)  Resp: 20 (07/07/24 1114)  BP: 138/60 (07/07/24 1114)  SpO2: 96 % (07/07/24 1114) Vital Signs (24h Range):  Temp:  [98.1 °F (36.7 °C)-99.2 °F (37.3 °C)] 98.1 °F (36.7 °C)  Pulse:  [72-81] 81  Resp:  [16-20] 20  SpO2:  [96 %-98 %] 96 %  BP: (103-158)/(57-71) 138/60     Weight: 87.5 kg (192 lb 14.4 oz)  Body mass index is 28.49 kg/m².    Intake/Output Summary (Last 24 hours) at 7/7/2024 1555  Last data filed at 7/7/2024 1128  Gross per 24 hour   Intake 550.43 ml   Output 3200 ml   Net -2649.57 ml         Physical Exam  Vitals and nursing note reviewed.   HENT:      Head: Normocephalic and atraumatic.      Mouth/Throat:      Mouth: Mucous membranes are moist.   Eyes:      Extraocular Movements: Extraocular movements intact.   Cardiovascular:      Rate and Rhythm: Normal rate.      Pulses: Normal pulses.      Heart sounds: Normal heart sounds.   Pulmonary:      Effort: Pulmonary effort is normal.      Breath sounds: Normal breath sounds.   Abdominal:      General: Bowel sounds are normal. There is no distension.      Palpations: Abdomen is soft.      Tenderness: There is no abdominal tenderness. There is no guarding.   Musculoskeletal:      Cervical back: Normal range of motion and neck supple.      Comments: Left AKA   Skin:     General: Skin is warm and dry.      Capillary Refill: Capillary refill takes 2 to 3 seconds.   Neurological:      Mental Status: He is alert and oriented to person, place, and time. Mental status is at baseline.      Motor: No weakness.   Psychiatric:         Mood and Affect: Mood normal.         Behavior: Behavior  normal.             Significant Labs: All pertinent labs within the past 24 hours have been reviewed.    Significant Imaging: I have reviewed all pertinent imaging results/findings within the past 24 hours.    Assessment/Plan:      * Anemia  Patient's anemia is currently uncontrolled. Has 2 u PRBC ordered. Etiology likely d/t Iron deficiency.   Current CBC reviewed-   Lab Results   Component Value Date    HGB 7.9 (L) 07/07/2024    HCT 25.3 (L) 07/07/2024     Monitor serial CBC and transfuse if patient becomes hemodynamically unstable, symptomatic or H/H drops below 7/21.  -attempted blood transfusion yesterday however patient developed chest transfusion was stopped.  Will order 1 pack of packed red blood cells this morning and premedicate prior.  Orders for p.r.n. Lasix to be given in between units.  PPI BID  --consulted GI.  --s/p endoscopy on 6/28:  Impression:            - Normal esophagus.                          - Erosive gastropathy with no stigmata of recent                          bleeding. Biopsied.                          - Normal examined duodenum.   Recommendation:        - Return patient to [facility] for ongoing care.                          - Resume previous diet.                          - Continue present medications.                          - Await pathology results.                          - Observe patient's clinical course.                          - Perform a colonoscopy at appointment to be                          scheduled.     Pressure injury of buttock, stage 4        Pressure injury of right heel, stage 4        Pressure injury of ankle, unstageable-lateral  Pressure injury of buttock, stage 4   Pressure injury of right heel, stage 4     - Appreciate IP wound care  - Pressure injury prevention interventions - waffle overlay and heel boot  - Vashe wet-to-dry to bilateral buttocks, R buttock, and R heel wound BID  - Mepilex border to L posterior thigh/gluteal fold 3x/week  -  Podiatry consulted for R heel wound   --also noted with a bilateral buttock full thickness pressure injury  --Right heel and sacrum CT ordered to r/o OM  --appreciate cardiology's evaluation      Wounds, multiple  -patient with wounds to his sacrum and heel  -wound care consulted  -Ulcer care ordered      Chronic anticoagulation  Continue home OAC      Stage 3b chronic kidney disease  Creatine stable for now. BMP reviewed- noted Estimated Creatinine Clearance: 99.8 mL/min (based on SCr of 0.7 mg/dL). according to latest data. Based on current GFR, CKD stage is stage 2 - GFR 60-89.  Monitor UOP and serial BMP and adjust therapy as needed. Renally dose meds. Avoid nephrotoxic medications and procedures.    Paroxysmal atrial fibrillation  Patient with trial fibrillation which is controlled currently with Calcium Channel Blocker. Patient is currently in atrial fibrillation.[id number] Score: 4. . Anticoagulation done with Eliquis    Osteomyelitis of right lower extremity  - Consulted ID, appreciate rec's  - IV abx ordered---continue vancomycin and Avycaz.   - Blood cultures from 6/26 with no growth to date  - Bone culture grew Pseudomonas and MRSA  -Appreciate IDs plan below:  -will need 6 weeks of therapy  -while outpatient will need weekly cbc, cmp, crp ,and vanco trough faxed to [phone] (or can be monitored at nursing home  - Appreciate Podiatry assistance with I&D        Chronic deep vein thrombosis (DVT) of right upper extremity  -home AC resumed  -follow      Hypokalemia  Patient has hypokalemia which is Acute and currently uncontrolled. Most recent potassium levels reviewed-   Lab Results   Component Value Date    K 3.4 (L) 07/07/2024   . Will continue potassium replacement per protocol and recheck repeat levels after replacement completed.     PVD (peripheral vascular disease)        Peripheral arterial disease  PVD (peripheral vascular disease)    -continue OAC and ASA      Type 2 diabetes mellitus, with  long-term current use of insulin    Patient's FSGs are uncontrolled due to hyperglycemia on current medication regimen.  Last A1c reviewed-   Lab Results   Component Value Date    HGBA1C 6.8 (H) 06/26/2024     Most recent fingerstick glucose reviewed-   Recent Labs   Lab 07/06/24 2039 07/07/24  0556 07/07/24  1128   POCTGLUCOSE 207* 138* 191*       Current correctional scale  Low  Maintain anti-hyperglycemic dose as follows-   Antihyperglycemics (From admission, onward)      Start     Stop Route Frequency Ordered    06/26/24 1552  insulin aspart U-100 pen 0-5 Units         -- SubQ Before meals & nightly PRN 06/26/24 1453          Hold Oral hypoglycemics while patient is in the hospital.      Essential hypertension  Chronic, uncontrolled. Latest blood pressure and vitals reviewed-     Temp:  [98.1 °F (36.7 °C)-99.2 °F (37.3 °C)]   Pulse:  [72-81]   Resp:  [16-20]   BP: (103-158)/(57-71)   SpO2:  [96 %-98 %] .   Home meds for hypertension were reviewed and noted below.   Hypertension Medications               furosemide (LASIX) 20 MG tablet Take 1 tablet (20 mg total) by mouth once daily.    NIFEdipine (PROCARDIA-XL) 90 MG (OSM) 24 hr tablet Take 1 tablet (90 mg total) by mouth once daily.    bumetanide (BUMEX) 0.5 MG Tab Take 0.5 mg by mouth daily as needed.    bumetanide (BUMEX) 1 MG tablet Take by mouth. unknown    hydrALAZINE (APRESOLINE) 100 MG tablet Take by mouth. unknown    isosorbide dinitrate (ISORDIL) 10 MG tablet Take 1 tablet (10 mg total) by mouth 3 (three) times daily. Hold until follow up with a provider            While in the hospital, will manage blood pressure as follows; will resume meds as tolerated     Will utilize p.r.n. blood pressure medication only if patient's blood pressure greater than 160/100 and he develops symptoms such as worsening chest pain or shortness of breath.      VTE Risk Mitigation (From admission, onward)           Ordered     apixaban tablet 5 mg  2 times daily          07/01/24 2139     Reason for No Pharmacological VTE Prophylaxis  Once        Question:  Reasons:  Answer:  Risk of Bleeding    06/26/24 1453     IP VTE HIGH RISK PATIENT  Once         06/26/24 1453     Place sequential compression device  Until discontinued         06/26/24 1453                    Discharge Planning   OXANA: 7/8/2024     Code Status: Full Code   Is the patient medically ready for discharge?:     Reason for patient still in hospital (select all that apply): Treatment and Pending disposition  Discharge Plan A: Long-term acute care facility (LTAC)   Discharge Delays: None known at this time              Ervin Valdovinos PA-C  Department of Hospital Medicine   [facility]

## 2024-07-07 NOTE — ASSESSMENT & PLAN NOTE
- Consulted ID, appreciate rec's  - IV abx ordered---continue vancomycin and Avycaz.   - Blood cultures from 6/26 with no growth to date  - Bone culture grew Pseudomonas and MRSA  -Appreciate IDs plan below:  -will need 6 weeks of therapy  -while outpatient will need weekly cbc, cmp, crp ,and vanco trough faxed to [phone] (or can be monitored at nursing home  - Appreciate Podiatry assistance with I&D

## 2024-07-07 NOTE — ASSESSMENT & PLAN NOTE
Patient's FSGs are uncontrolled due to hyperglycemia on current medication regimen.  Last A1c reviewed-   Lab Results   Component Value Date    HGBA1C 6.8 (H) 06/26/2024     Most recent fingerstick glucose reviewed-   Recent Labs   Lab 07/06/24 2039 07/07/24  0556 07/07/24  1128   POCTGLUCOSE 207* 138* 191*       Current correctional scale  Low  Maintain anti-hyperglycemic dose as follows-   Antihyperglycemics (From admission, onward)    Start     Stop Route Frequency Ordered    06/26/24 1552  insulin aspart U-100 pen 0-5 Units         -- SubQ Before meals & nightly PRN 06/26/24 1453        Hold Oral hypoglycemics while patient is in the hospital.

## 2024-07-08 PROBLEM — E87.6 HYPOKALEMIA: Status: RESOLVED | Noted: 2021-02-18 | Resolved: 2024-07-08

## 2024-07-08 LAB
ALBUMIN SERPL BCP-MCNC: 1.4 G/DL (ref 3.5–5.2)
ALP SERPL-CCNC: 51 U/L (ref 55–135)
ALT SERPL W/O P-5'-P-CCNC: <5 U/L (ref 10–44)
ANION GAP SERPL CALC-SCNC: 5 MMOL/L (ref 8–16)
AST SERPL-CCNC: 14 U/L (ref 10–40)
BASOPHILS # BLD AUTO: 0.05 K/UL (ref 0–0.2)
BASOPHILS NFR BLD: 0.6 % (ref 0–1.9)
BILIRUB SERPL-MCNC: 0.2 MG/DL (ref 0.1–1)
BUN SERPL-MCNC: 10 MG/DL (ref 8–23)
CALCIUM SERPL-MCNC: 8.5 MG/DL (ref 8.7–10.5)
CHLORIDE SERPL-SCNC: 100 MMOL/L (ref 95–110)
CO2 SERPL-SCNC: 31 MMOL/L (ref 23–29)
CREAT SERPL-MCNC: 0.8 MG/DL (ref 0.5–1.4)
DIFFERENTIAL METHOD BLD: ABNORMAL
EOSINOPHIL # BLD AUTO: 0.9 K/UL (ref 0–0.5)
EOSINOPHIL NFR BLD: 10.9 % (ref 0–8)
ERYTHROCYTE [DISTWIDTH] IN BLOOD BY AUTOMATED COUNT: 18.2 % (ref 11.5–14.5)
EST. GFR  (NO RACE VARIABLE): >60 ML/MIN/1.73 M^2
GLUCOSE SERPL-MCNC: 169 MG/DL (ref 70–110)
HCT VFR BLD AUTO: 25.6 % (ref 40–54)
HGB BLD-MCNC: 7.9 G/DL (ref 14–18)
IMM GRANULOCYTES # BLD AUTO: 0.03 K/UL (ref 0–0.04)
IMM GRANULOCYTES NFR BLD AUTO: 0.4 % (ref 0–0.5)
LYMPHOCYTES # BLD AUTO: 1.7 K/UL (ref 1–4.8)
LYMPHOCYTES NFR BLD: 21.2 % (ref 18–48)
MCH RBC QN AUTO: 25.7 PG (ref 27–31)
MCHC RBC AUTO-ENTMCNC: 30.9 G/DL (ref 32–36)
MCV RBC AUTO: 83 FL (ref 82–98)
MONOCYTES # BLD AUTO: 0.7 K/UL (ref 0.3–1)
MONOCYTES NFR BLD: 8.4 % (ref 4–15)
NEUTROPHILS # BLD AUTO: 4.8 K/UL (ref 1.8–7.7)
NEUTROPHILS NFR BLD: 58.5 % (ref 38–73)
NRBC BLD-RTO: 0 /100 WBC
PLATELET # BLD AUTO: 448 K/UL (ref 150–450)
PMV BLD AUTO: 8.8 FL (ref 9.2–12.9)
POCT GLUCOSE: 161 MG/DL (ref 70–110)
POCT GLUCOSE: 197 MG/DL (ref 70–110)
POCT GLUCOSE: 209 MG/DL (ref 70–110)
POCT GLUCOSE: 212 MG/DL (ref 70–110)
POTASSIUM SERPL-SCNC: 3.9 MMOL/L (ref 3.5–5.1)
PROT SERPL-MCNC: 5.8 G/DL (ref 6–8.4)
RBC # BLD AUTO: 3.07 M/UL (ref 4.6–6.2)
SODIUM SERPL-SCNC: 136 MMOL/L (ref 136–145)
WBC # BLD AUTO: 8.2 K/UL (ref 3.9–12.7)

## 2024-07-08 PROCEDURE — 99900035 HC TECH TIME PER 15 MIN (STAT)

## 2024-07-08 PROCEDURE — A4216 STERILE WATER/SALINE, 10 ML: HCPCS | Performed by: NURSE PRACTITIONER

## 2024-07-08 PROCEDURE — 80053 COMPREHEN METABOLIC PANEL: CPT

## 2024-07-08 PROCEDURE — 63600175 PHARM REV CODE 636 W HCPCS: Performed by: NURSE PRACTITIONER

## 2024-07-08 PROCEDURE — 21400001 HC TELEMETRY ROOM

## 2024-07-08 PROCEDURE — 94761 N-INVAS EAR/PLS OXIMETRY MLT: CPT

## 2024-07-08 PROCEDURE — 27000207 HC ISOLATION

## 2024-07-08 PROCEDURE — 25000003 PHARM REV CODE 250

## 2024-07-08 PROCEDURE — 25000003 PHARM REV CODE 250: Performed by: NURSE PRACTITIONER

## 2024-07-08 PROCEDURE — 63600175 PHARM REV CODE 636 W HCPCS: Performed by: HOSPITALIST

## 2024-07-08 PROCEDURE — 25000003 PHARM REV CODE 250: Performed by: HOSPITALIST

## 2024-07-08 PROCEDURE — 63600175 PHARM REV CODE 636 W HCPCS

## 2024-07-08 PROCEDURE — 85025 COMPLETE CBC W/AUTO DIFF WBC: CPT | Performed by: NURSE PRACTITIONER

## 2024-07-08 RX ORDER — HYDROCODONE BITARTRATE AND ACETAMINOPHEN 10; 325 MG/1; MG/1
1 TABLET ORAL EVERY 6 HOURS PRN
Status: CANCELLED | OUTPATIENT
Start: 2024-07-08

## 2024-07-08 RX ORDER — FUROSEMIDE 20 MG/1
20 TABLET ORAL DAILY
Status: CANCELLED | OUTPATIENT
Start: 2024-07-08

## 2024-07-08 RX ORDER — FUROSEMIDE 10 MG/ML
20 INJECTION INTRAMUSCULAR; INTRAVENOUS
Status: CANCELLED | OUTPATIENT
Start: 2024-07-08

## 2024-07-08 RX ORDER — ONDANSETRON 8 MG/1
8 TABLET, ORALLY DISINTEGRATING ORAL EVERY 8 HOURS PRN
Status: CANCELLED | OUTPATIENT
Start: 2024-07-08

## 2024-07-08 RX ORDER — TALC
6 POWDER (GRAM) TOPICAL NIGHTLY PRN
Status: CANCELLED | OUTPATIENT
Start: 2024-07-08

## 2024-07-08 RX ORDER — NIFEDIPINE 60 MG/1
60 TABLET, EXTENDED RELEASE ORAL DAILY
Status: DISCONTINUED | OUTPATIENT
Start: 2024-07-09 | End: 2024-07-11 | Stop reason: HOSPADM

## 2024-07-08 RX ORDER — ASPIRIN 81 MG/1
81 TABLET ORAL
Status: CANCELLED | OUTPATIENT
Start: 2024-07-08

## 2024-07-08 RX ORDER — LANOLIN ALCOHOL/MO/W.PET/CERES
1 CREAM (GRAM) TOPICAL
Status: CANCELLED | OUTPATIENT
Start: 2024-07-08

## 2024-07-08 RX ORDER — HYDROCODONE BITARTRATE AND ACETAMINOPHEN 500; 5 MG/1; MG/1
TABLET ORAL
Status: CANCELLED | OUTPATIENT
Start: 2024-07-08

## 2024-07-08 RX ORDER — INSULIN ASPART 100 [IU]/ML
2-10 INJECTION, SOLUTION INTRAVENOUS; SUBCUTANEOUS
Status: CANCELLED | OUTPATIENT
Start: 2024-07-08

## 2024-07-08 RX ORDER — GABAPENTIN 300 MG/1
300 CAPSULE ORAL 2 TIMES DAILY
Status: CANCELLED | OUTPATIENT
Start: 2024-07-08

## 2024-07-08 RX ORDER — ACETAMINOPHEN 325 MG/1
650 TABLET ORAL EVERY 4 HOURS PRN
Status: CANCELLED | OUTPATIENT
Start: 2024-07-08

## 2024-07-08 RX ORDER — IPRATROPIUM BROMIDE AND ALBUTEROL SULFATE 2.5; .5 MG/3ML; MG/3ML
3 SOLUTION RESPIRATORY (INHALATION) EVERY 4 HOURS PRN
Status: CANCELLED | OUTPATIENT
Start: 2024-07-08

## 2024-07-08 RX ORDER — GLUCAGON 1 MG
1 KIT INJECTION
Status: CANCELLED | OUTPATIENT
Start: 2024-07-08

## 2024-07-08 RX ORDER — ASCORBIC ACID 500 MG
500 TABLET ORAL 2 TIMES DAILY
Status: CANCELLED | OUTPATIENT
Start: 2024-07-08

## 2024-07-08 RX ORDER — TAMSULOSIN HYDROCHLORIDE 0.4 MG/1
0.4 CAPSULE ORAL DAILY
Status: CANCELLED | OUTPATIENT
Start: 2024-07-08

## 2024-07-08 RX ORDER — HYDRALAZINE HYDROCHLORIDE 100 MG/1
TABLET, FILM COATED ORAL
Status: CANCELLED | OUTPATIENT
Start: 2024-07-08

## 2024-07-08 RX ORDER — ZINC SULFATE 50(220)MG
220 CAPSULE ORAL EVERY MORNING
Status: CANCELLED | OUTPATIENT
Start: 2024-07-08

## 2024-07-08 RX ORDER — INSULIN ASPART 100 [IU]/ML
5 INJECTION, SOLUTION INTRAVENOUS; SUBCUTANEOUS
Status: CANCELLED | OUTPATIENT
Start: 2024-07-08

## 2024-07-08 RX ORDER — ACETAMINOPHEN 325 MG/1
650 TABLET ORAL EVERY 6 HOURS PRN
Status: CANCELLED | OUTPATIENT
Start: 2024-07-08

## 2024-07-08 RX ORDER — INSULIN ASPART 100 [IU]/ML
0-5 INJECTION, SOLUTION INTRAVENOUS; SUBCUTANEOUS
Status: CANCELLED | OUTPATIENT
Start: 2024-07-08

## 2024-07-08 RX ORDER — FLUCYTOSINE 500 MG/1
CAPSULE ORAL
Status: CANCELLED | OUTPATIENT
Start: 2024-07-08

## 2024-07-08 RX ORDER — ISOSORBIDE DINITRATE 10 MG/1
10 TABLET ORAL 3 TIMES DAILY
Status: CANCELLED | OUTPATIENT
Start: 2024-07-08

## 2024-07-08 RX ORDER — IBUPROFEN 200 MG
16 TABLET ORAL
Status: CANCELLED | OUTPATIENT
Start: 2024-07-08

## 2024-07-08 RX ORDER — LANOLIN ALCOHOL/MO/W.PET/CERES
1 CREAM (GRAM) TOPICAL DAILY
Status: CANCELLED | OUTPATIENT
Start: 2024-07-08

## 2024-07-08 RX ORDER — ASPIRIN 81 MG/1
81 TABLET ORAL DAILY
Status: CANCELLED | OUTPATIENT
Start: 2024-07-08

## 2024-07-08 RX ORDER — SIMETHICONE 80 MG
1 TABLET,CHEWABLE ORAL 4 TIMES DAILY PRN
Status: CANCELLED | OUTPATIENT
Start: 2024-07-08

## 2024-07-08 RX ORDER — ALUMINUM HYDROXIDE, MAGNESIUM HYDROXIDE, AND SIMETHICONE 1200; 120; 1200 MG/30ML; MG/30ML; MG/30ML
30 SUSPENSION ORAL 4 TIMES DAILY PRN
Status: CANCELLED | OUTPATIENT
Start: 2024-07-08

## 2024-07-08 RX ORDER — ERGOCALCIFEROL 1.25 MG/1
50000 CAPSULE ORAL
Status: CANCELLED | OUTPATIENT
Start: 2024-07-08

## 2024-07-08 RX ORDER — LEVOFLOXACIN 750 MG/1
750 TABLET ORAL DAILY
Status: CANCELLED | OUTPATIENT
Start: 2024-07-08

## 2024-07-08 RX ORDER — DIPHENHYDRAMINE HCL 25 MG
25 CAPSULE ORAL EVERY 6 HOURS PRN
Status: CANCELLED | OUTPATIENT
Start: 2024-07-08

## 2024-07-08 RX ORDER — IBUPROFEN 200 MG
24 TABLET ORAL
Status: CANCELLED | OUTPATIENT
Start: 2024-07-08

## 2024-07-08 RX ORDER — SODIUM CHLORIDE 0.9 % (FLUSH) 0.9 %
10 SYRINGE (ML) INJECTION EVERY 8 HOURS
Status: CANCELLED | OUTPATIENT
Start: 2024-07-08

## 2024-07-08 RX ORDER — ONDANSETRON HYDROCHLORIDE 2 MG/ML
4 INJECTION, SOLUTION INTRAVENOUS EVERY 8 HOURS PRN
Status: CANCELLED | OUTPATIENT
Start: 2024-07-08

## 2024-07-08 RX ORDER — AMOXICILLIN 250 MG
1 CAPSULE ORAL 2 TIMES DAILY
Status: CANCELLED | OUTPATIENT
Start: 2024-07-08

## 2024-07-08 RX ORDER — INSULIN GLARGINE 100 [IU]/ML
12 INJECTION, SOLUTION SUBCUTANEOUS 2 TIMES DAILY
Status: CANCELLED | OUTPATIENT
Start: 2024-07-08

## 2024-07-08 RX ORDER — MORPHINE SULFATE 2 MG/ML
1 INJECTION, SOLUTION INTRAMUSCULAR; INTRAVENOUS EVERY 6 HOURS PRN
Status: CANCELLED | OUTPATIENT
Start: 2024-07-08

## 2024-07-08 RX ORDER — OXYCODONE HYDROCHLORIDE 5 MG/1
5 TABLET ORAL EVERY 6 HOURS PRN
Status: CANCELLED | OUTPATIENT
Start: 2024-07-08

## 2024-07-08 RX ORDER — PANTOPRAZOLE SODIUM 40 MG/1
40 TABLET, DELAYED RELEASE ORAL DAILY
Status: CANCELLED | OUTPATIENT
Start: 2024-07-08

## 2024-07-08 RX ORDER — FLUCONAZOLE 100 MG/1
100 TABLET ORAL
Status: CANCELLED | OUTPATIENT
Start: 2024-07-08

## 2024-07-08 RX ADMIN — ISOSORBIDE DINITRATE 10 MG: 10 TABLET ORAL at 09:07

## 2024-07-08 RX ADMIN — PANTOPRAZOLE SODIUM 40 MG: 40 INJECTION, POWDER, LYOPHILIZED, FOR SOLUTION INTRAVENOUS at 09:07

## 2024-07-08 RX ADMIN — INSULIN ASPART 1 UNITS: 100 INJECTION, SOLUTION INTRAVENOUS; SUBCUTANEOUS at 10:07

## 2024-07-08 RX ADMIN — Medication 10 ML: at 05:07

## 2024-07-08 RX ADMIN — GABAPENTIN 300 MG: 300 CAPSULE ORAL at 09:07

## 2024-07-08 RX ADMIN — APIXABAN 5 MG: 5 TABLET, FILM COATED ORAL at 09:07

## 2024-07-08 RX ADMIN — FERROUS SULFATE TAB 325 MG (65 MG ELEMENTAL FE) 1 EACH: 325 (65 FE) TAB at 09:07

## 2024-07-08 RX ADMIN — INSULIN ASPART 2 UNITS: 100 INJECTION, SOLUTION INTRAVENOUS; SUBCUTANEOUS at 04:07

## 2024-07-08 RX ADMIN — CEFEPIME 2 G: 2 INJECTION, POWDER, FOR SOLUTION INTRAVENOUS at 02:07

## 2024-07-08 RX ADMIN — TAMSULOSIN HYDROCHLORIDE 0.4 MG: 0.4 CAPSULE ORAL at 09:07

## 2024-07-08 RX ADMIN — ISOSORBIDE DINITRATE 10 MG: 10 TABLET ORAL at 02:07

## 2024-07-08 RX ADMIN — CEFEPIME 2 G: 2 INJECTION, POWDER, FOR SOLUTION INTRAVENOUS at 09:07

## 2024-07-08 RX ADMIN — CEFTAZIDIME, AVIBACTAM 2.5 G: 2; .5 POWDER, FOR SOLUTION INTRAVENOUS at 05:07

## 2024-07-08 RX ADMIN — Medication 10 ML: at 09:07

## 2024-07-08 RX ADMIN — Medication 10 ML: at 02:07

## 2024-07-08 RX ADMIN — ASPIRIN 81 MG: 81 TABLET, COATED ORAL at 09:07

## 2024-07-08 RX ADMIN — VANCOMYCIN HYDROCHLORIDE 1250 MG: 1.25 INJECTION, POWDER, LYOPHILIZED, FOR SOLUTION INTRAVENOUS at 04:07

## 2024-07-08 NOTE — ASSESSMENT & PLAN NOTE
Patient's FSGs are uncontrolled due to hyperglycemia on current medication regimen.  Last A1c reviewed-   Lab Results   Component Value Date    HGBA1C 6.8 (H) 06/26/2024     Most recent fingerstick glucose reviewed-   Recent Labs   Lab 07/07/24  1633 07/07/24  2047 07/08/24  0606 07/08/24  1204   POCTGLUCOSE 193* 209* 161* 197*       Current correctional scale  Low  Maintain anti-hyperglycemic dose as follows-   Antihyperglycemics (From admission, onward)    Start     Stop Route Frequency Ordered    06/26/24 1552  insulin aspart U-100 pen 0-5 Units         -- SubQ Before meals & nightly PRN 06/26/24 1453        Hold Oral hypoglycemics while patient is in the hospital.

## 2024-07-08 NOTE — NURSING
Pt had BM. Wound care performed per orders. Pt c/o of pain at wound sited. PRN pain medication administered per MAR.

## 2024-07-08 NOTE — ASSESSMENT & PLAN NOTE
- Consulted ID, appreciate rec's  - IV abx ordered---Avycaz transitioned back to cefepime per ID, continue vancomycin.   - Blood cultures from 6/26 with no growth to date  - Bone culture grew Pseudomonas and MRSA  -Appreciate IDs plan below:  -will need 6 weeks of therapy  -while outpatient will need weekly cbc, cmp, crp ,and vanco trough faxed to [phone] (or can be monitored at nursing home  - Appreciate Podiatry assistance with I&D

## 2024-07-08 NOTE — SUBJECTIVE & OBJECTIVE
Interval History: Nursing notes reviewed and no acute events overnight. Pt w/ some bradycardia overnight now resolved. Buttocks wound pain controlled. Pt has no complaints. Avycaz discontinued and okay to transition back to cefepime per ID. Continue Vancomycin. Anticipate transfer to LTAC tomorrow.    Review of Systems   Constitutional:  Positive for activity change, appetite change and fatigue. Negative for fever.   HENT:  Negative for trouble swallowing.    Respiratory:  Negative for cough.    Cardiovascular:  Negative for chest pain.   Gastrointestinal:  Negative for diarrhea, nausea and vomiting.   Genitourinary:  Negative for hematuria.   Musculoskeletal:  Positive for arthralgias and myalgias.   Skin:  Positive for wound.   Neurological:  Positive for weakness.   Psychiatric/Behavioral:  Negative for confusion.      Objective:     Vital Signs (Most Recent):  Temp: 98 °F (36.7 °C) (07/08/24 1202)  Pulse: 78 (07/08/24 1202)  Resp: 20 (07/08/24 1202)  BP: (!) 170/73 (07/08/24 1202)  SpO2: 99 % (07/08/24 1202) Vital Signs (24h Range):  Temp:  [98 °F (36.7 °C)-98.6 °F (37 °C)] 98 °F (36.7 °C)  Pulse:  [43-89] 78  Resp:  [18-20] 20  SpO2:  [96 %-99 %] 99 %  BP: (121-170)/(44-73) 170/73     Weight: 87.5 kg (192 lb 14.4 oz)  Body mass index is 28.49 kg/m².    Intake/Output Summary (Last 24 hours) at 7/8/2024 1539  Last data filed at 7/8/2024 1200  Gross per 24 hour   Intake 798.82 ml   Output 1225 ml   Net -426.18 ml         Physical Exam  Vitals and nursing note reviewed.   HENT:      Head: Normocephalic and atraumatic.      Mouth/Throat:      Mouth: Mucous membranes are moist.   Eyes:      Extraocular Movements: Extraocular movements intact.   Cardiovascular:      Rate and Rhythm: Normal rate.      Pulses: Normal pulses.      Heart sounds: Normal heart sounds.   Pulmonary:      Effort: Pulmonary effort is normal.      Breath sounds: Normal breath sounds.   Abdominal:      General: Bowel sounds are normal. There is  no distension.      Palpations: Abdomen is soft.      Tenderness: There is no abdominal tenderness. There is no guarding.   Genitourinary:     Comments: Sams in place  Musculoskeletal:      Cervical back: Normal range of motion and neck supple.      Comments: Left AKA   Skin:     General: Skin is warm and dry.      Capillary Refill: Capillary refill takes 2 to 3 seconds.   Neurological:      Mental Status: He is alert and oriented to person, place, and time. Mental status is at baseline.      Motor: No weakness.   Psychiatric:         Mood and Affect: Mood normal.         Behavior: Behavior normal.             Significant Labs: All pertinent labs within the past 24 hours have been reviewed.    Significant Imaging: I have reviewed all pertinent imaging results/findings within the past 24 hours.

## 2024-07-08 NOTE — ASSESSMENT & PLAN NOTE
Chronic, uncontrolled. Latest blood pressure and vitals reviewed-     Temp:  [98 °F (36.7 °C)-98.6 °F (37 °C)]   Pulse:  [43-89]   Resp:  [18-20]   BP: (121-170)/(44-73)   SpO2:  [96 %-99 %] .   Home meds for hypertension were reviewed and noted below.   Hypertension Medications               furosemide (LASIX) 20 MG tablet Take 1 tablet (20 mg total) by mouth once daily.    NIFEdipine (PROCARDIA-XL) 90 MG (OSM) 24 hr tablet Take 1 tablet (90 mg total) by mouth once daily.    bumetanide (BUMEX) 0.5 MG Tab Take 0.5 mg by mouth daily as needed.    bumetanide (BUMEX) 1 MG tablet Take by mouth. unknown    hydrALAZINE (APRESOLINE) 100 MG tablet Take by mouth. unknown    isosorbide dinitrate (ISORDIL) 10 MG tablet Take 1 tablet (10 mg total) by mouth 3 (three) times daily. Hold until follow up with a provider            While in the hospital, will manage blood pressure as follows; will resume meds as tolerated     Will utilize p.r.n. blood pressure medication only if patient's blood pressure greater than 160/100 and he develops symptoms such as worsening chest pain or shortness of breath.

## 2024-07-08 NOTE — PLAN OF CARE
Problem: Adult Inpatient Plan of Care  Goal: Plan of Care Review  Outcome: Progressing  Goal: Patient-Specific Goal (Individualized)  Outcome: Progressing  Goal: Optimal Comfort and Wellbeing  Outcome: Progressing  Goal: Readiness for Transition of Care  Outcome: Progressing     Problem: Gastrointestinal Bleeding  Goal: Optimal Coping with Acute Illness  Outcome: Progressing  Goal: Hemostasis  Outcome: Progressing     Problem: Diabetes Comorbidity  Goal: Blood Glucose Level Within Targeted Range  Outcome: Progressing     Problem: Sepsis/Septic Shock  Goal: Optimal Coping  Outcome: Progressing

## 2024-07-08 NOTE — PLAN OF CARE
07/08/24 1027   Discharge Reassessment   Assessment Type Discharge Planning Reassessment   Did the patient's condition or plan change since previous assessment? Yes   Discharge Plan discussed with: Patient;Caregiver   Communicated OXANA with patient/caregiver Yes   Discharge Plan A Long-term acute care facility (LTAC)   Discharge Plan B Return to Nursing Home;Skilled Nursing Facility   DME Needed Upon Discharge  none   Transition of Care Barriers Unable to afford medication  (correction NH resident. IV ABX at DC too expensive for NH. LTAC pursued. Pending accepting facility with high med cost.)   Post-Acute Status   Post-Acute Authorization Placement   Post-Acute Placement Status Pending post-acute provider review/more information requested   Coverage [employer] PPO   Discharge Delays None known at this time     No future appointments.    No orders of the defined types were placed in this encounter.

## 2024-07-08 NOTE — ASSESSMENT & PLAN NOTE
Patient's anemia is currently uncontrolled. Has 2 u PRBC ordered. Etiology likely d/t Iron deficiency.   Current CBC reviewed-   Lab Results   Component Value Date    HGB 7.9 (L) 07/08/2024    HCT 25.6 (L) 07/08/2024     Monitor serial CBC and transfuse if patient becomes hemodynamically unstable, symptomatic or H/H drops below 7/21.  -attempted blood transfusion yesterday however patient developed chest transfusion was stopped.  Will order 1 pack of packed red blood cells this morning and premedicate prior.  Orders for p.r.n. Lasix to be given in between units.  PPI BID  --H/H stable today  --consulted GI.  --s/p endoscopy on 6/28:  Impression:            - Normal esophagus.                          - Erosive gastropathy with no stigmata of recent                          bleeding. Biopsied.                          - Normal examined duodenum.   Recommendation:        - Return patient to hospital [facility] for ongoing care.                          - Resume previous diet.                          - Continue present medications.                          - Await pathology results.                          - Observe patient's clinical course.                          - Perform a colonoscopy at appointment to be                          scheduled.

## 2024-07-08 NOTE — PLAN OF CARE
LTAC referral under review with [facility] and [facility]. Pending accepting facility for LTAC.       07/08/24 1557   Post-Acute Status   Post-Acute Authorization Placement   Post-Acute Placement Status Pending post-acute provider review/more information requested

## 2024-07-08 NOTE — PLAN OF CARE
ID changed recs.  Pending updated ID note today then [facility] will review for admission.  If accepted, they will submit for LTAC auth.  CM will continue to follow.       07/08/24 1338   Post-Acute Status   Post-Acute Authorization Placement   Post-Acute Placement Status Referrals Sent   Discharge Plan   Discharge Plan A Long-term acute care facility (LTAC)       Ike Vargas, RN   Supervisor Case Management-[facility]  [phone]

## 2024-07-08 NOTE — PLAN OF CARE
[facility] unable to accept due to Avycaz cost $1200/day through 8/12/24.       07/08/24 1029   Post-Acute Status   Post-Acute Authorization Placement   Discharge Plan   Discharge Plan A Long-term acute care facility (LTAC)       Ike Vargas, RN   Supervisor Case Management-[facility]  [phone]

## 2024-07-08 NOTE — PROGRESS NOTES
[facility] Medicine  Progress Note    Patient Name: Saji Castañeda  MRN: [id number]  Patient Class: IP- Inpatient   Admission Date: 6/26/2024  Length of Stay: 11 days  Attending Physician: Efrain Coleman MD  Primary Care Provider: No primary care provider on file.        Subjective:     Principal Problem:Anemia        HPI:  Saji Castañeda is a 75 y/o M who has a pmh of Arthritis, Bacteremia due to Gram-negative bacteria, Diabetes mellitus, Diabetes mellitus, type 2, Hyperlipidemia, Hypertension, Osteomyelitis, and Palliative care encounter. presenting to the Emergency Department for referral. Patient was sent to the ED via EMS from [facility] for low blood count. Patient denies any chest pain, shortness on breath, weakness, fatigue, dizziness. His hemoglobin was 6.3--will order 1 u PRBC ordered. FOBT pending. Will admit to the [facility] service for further care.    Overview/Hospital Course:  He was admitted to the hospital medicine service for further care.  Patient noted with anemia as well as osteomyelitis on admission.  Patient underwent a EGD on 06/28 which noted Erosive gastropathy.  Anticoagulation is on hold until cleared by GI.  He did receive 1 unit of packed red blood cells on admission.  CT of his right foot noted with osteomyelitis.  Appreciate Podiatry is evaluation---status post bone biopsy on today---also appreciate infectious disease.    Interval History: Nursing notes reviewed and no acute events overnight. Pt w/ some bradycardia overnight now resolved. Buttocks wound pain controlled. Pt has no complaints. Avycaz discontinued and okay to transition back to cefepime per ID. Continue Vancomycin. Anticipate transfer to LTAC tomorrow.    Review of Systems   Constitutional:  Positive for activity change, appetite change and fatigue. Negative for fever.   HENT:  Negative for trouble swallowing.    Respiratory:  Negative for cough.    Cardiovascular:  Negative for chest pain.    Gastrointestinal:  Negative for diarrhea, nausea and vomiting.   Genitourinary:  Negative for hematuria.   Musculoskeletal:  Positive for arthralgias and myalgias.   Skin:  Positive for wound.   Neurological:  Positive for weakness.   Psychiatric/Behavioral:  Negative for confusion.      Objective:     Vital Signs (Most Recent):  Temp: 98 °F (36.7 °C) (07/08/24 1202)  Pulse: 78 (07/08/24 1202)  Resp: 20 (07/08/24 1202)  BP: (!) 170/73 (07/08/24 1202)  SpO2: 99 % (07/08/24 1202) Vital Signs (24h Range):  Temp:  [98 °F (36.7 °C)-98.6 °F (37 °C)] 98 °F (36.7 °C)  Pulse:  [43-89] 78  Resp:  [18-20] 20  SpO2:  [96 %-99 %] 99 %  BP: (121-170)/(44-73) 170/73     Weight: 87.5 kg (192 lb 14.4 oz)  Body mass index is 28.49 kg/m².    Intake/Output Summary (Last 24 hours) at 7/8/2024 1539  Last data filed at 7/8/2024 1200  Gross per 24 hour   Intake 798.82 ml   Output 1225 ml   Net -426.18 ml         Physical Exam  Vitals and nursing note reviewed.   HENT:      Head: Normocephalic and atraumatic.      Mouth/Throat:      Mouth: Mucous membranes are moist.   Eyes:      Extraocular Movements: Extraocular movements intact.   Cardiovascular:      Rate and Rhythm: Normal rate.      Pulses: Normal pulses.      Heart sounds: Normal heart sounds.   Pulmonary:      Effort: Pulmonary effort is normal.      Breath sounds: Normal breath sounds.   Abdominal:      General: Bowel sounds are normal. There is no distension.      Palpations: Abdomen is soft.      Tenderness: There is no abdominal tenderness. There is no guarding.   Genitourinary:     Comments: Sams in place  Musculoskeletal:      Cervical back: Normal range of motion and neck supple.      Comments: Left AKA   Skin:     General: Skin is warm and dry.      Capillary Refill: Capillary refill takes 2 to 3 seconds.   Neurological:      Mental Status: He is alert and oriented to person, place, and time. Mental status is at baseline.      Motor: No weakness.   Psychiatric:          Mood and Affect: Mood normal.         Behavior: Behavior normal.             Significant Labs: All pertinent labs within the past 24 hours have been reviewed.    Significant Imaging: I have reviewed all pertinent imaging results/findings within the past 24 hours.    Assessment/Plan:      * Anemia  Patient's anemia is currently uncontrolled. Has 2 u PRBC ordered. Etiology likely d/t Iron deficiency.   Current CBC reviewed-   Lab Results   Component Value Date    HGB 7.9 (L) 07/08/2024    HCT 25.6 (L) 07/08/2024     Monitor serial CBC and transfuse if patient becomes hemodynamically unstable, symptomatic or H/H drops below 7/21.  -attempted blood transfusion yesterday however patient developed chest transfusion was stopped.  Will order 1 pack of packed red blood cells this morning and premedicate prior.  Orders for p.r.n. Lasix to be given in between units.  PPI BID  --H/H stable today  --consulted GI.  --s/p endoscopy on 6/28:  Impression:            - Normal esophagus.                          - Erosive gastropathy with no stigmata of recent                          bleeding. Biopsied.                          - Normal examined duodenum.   Recommendation:        - Return patient to hospital [facility] for ongoing care.                          - Resume previous diet.                          - Continue present medications.                          - Await pathology results.                          - Observe patient's clinical course.                          - Perform a colonoscopy at appointment to be                          scheduled.     Pressure injury of buttock, stage 4        Pressure injury of right heel, stage 4        Pressure injury of ankle, unstageable-lateral  Pressure injury of buttock, stage 4   Pressure injury of right heel, stage 4     - Appreciate IP wound care  - Pressure injury prevention interventions - waffle overlay and heel boot  - Vashe wet-to-dry to bilateral buttocks, R buttock, and R heel  wound BID  - Mepilex border to L posterior thigh/gluteal fold 3x/week  - Podiatry consulted for R heel wound   --also noted with a bilateral buttock full thickness pressure injury  --Right heel and sacrum CT ordered to r/o OM  --appreciate cardiology's evaluation      Wounds, multiple  -patient with wounds to his sacrum and heel  -wound care consulted  -Ulcer care ordered      Chronic anticoagulation  Continue home OAC      Stage 3b chronic kidney disease  Creatine stable for now. BMP reviewed- noted Estimated Creatinine Clearance: 87.3 mL/min (based on SCr of 0.8 mg/dL). according to latest data. Based on current GFR, CKD stage is stage 2 - GFR 60-89.  Monitor UOP and serial BMP and adjust therapy as needed. Renally dose meds. Avoid nephrotoxic medications and procedures.    Paroxysmal atrial fibrillation  Patient with trial fibrillation which is controlled currently with Calcium Channel Blocker. Patient is currently in atrial fibrillation.[id number] Score: 4. . Anticoagulation done with Eliquis  -Restart Nifedipine XR at 60mg for tomorrow    Osteomyelitis of right lower extremity  - Consulted ID, appreciate rec's  - IV abx ordered---Avycaz transitioned back to cefepime per ID, continue vancomycin.   - Blood cultures from 6/26 with no growth to date  - Bone culture grew Pseudomonas and MRSA  -Appreciate IDs plan below:  -will need 6 weeks of therapy  -while outpatient will need weekly cbc, cmp, crp ,and vanco trough faxed to [phone] (or can be monitored at nursing home  - Appreciate Podiatry assistance with I&D        Chronic deep vein thrombosis (DVT) of right upper extremity  -home AC resumed  -follow      PVD (peripheral vascular disease)        Peripheral arterial disease  PVD (peripheral vascular disease)    -continue OAC and ASA      Type 2 diabetes mellitus, with long-term current use of insulin    Patient's FSGs are uncontrolled due to hyperglycemia on current medication regimen.  Last A1c reviewed-    Lab Results   Component Value Date    HGBA1C 6.8 (H) 06/26/2024     Most recent fingerstick glucose reviewed-   Recent Labs   Lab 07/07/24  1633 07/07/24  2047 07/08/24  0606 07/08/24  1204   POCTGLUCOSE 193* 209* 161* 197*       Current correctional scale  Low  Maintain anti-hyperglycemic dose as follows-   Antihyperglycemics (From admission, onward)      Start     Stop Route Frequency Ordered    06/26/24 1552  insulin aspart U-100 pen 0-5 Units         -- SubQ Before meals & nightly PRN 06/26/24 1453          Hold Oral hypoglycemics while patient is in the hospital.      Essential hypertension  Chronic, uncontrolled. Latest blood pressure and vitals reviewed-     Temp:  [98 °F (36.7 °C)-98.6 °F (37 °C)]   Pulse:  [43-89]   Resp:  [18-20]   BP: (121-170)/(44-73)   SpO2:  [96 %-99 %] .   Home meds for hypertension were reviewed and noted below.   Hypertension Medications               furosemide (LASIX) 20 MG tablet Take 1 tablet (20 mg total) by mouth once daily.    NIFEdipine (PROCARDIA-XL) 90 MG (OSM) 24 hr tablet Take 1 tablet (90 mg total) by mouth once daily.    bumetanide (BUMEX) 0.5 MG Tab Take 0.5 mg by mouth daily as needed.    bumetanide (BUMEX) 1 MG tablet Take by mouth. unknown    hydrALAZINE (APRESOLINE) 100 MG tablet Take by mouth. unknown    isosorbide dinitrate (ISORDIL) 10 MG tablet Take 1 tablet (10 mg total) by mouth 3 (three) times daily. Hold until follow up with a provider            While in the hospital, will manage blood pressure as follows; will resume meds as tolerated     Will utilize p.r.n. blood pressure medication only if patient's blood pressure greater than 160/100 and he develops symptoms such as worsening chest pain or shortness of breath.      VTE Risk Mitigation (From admission, onward)           Ordered     apixaban tablet 5 mg  2 times daily         07/01/24 2137     Reason for No Pharmacological VTE Prophylaxis  Once        Question:  Reasons:  Answer:  Risk of Bleeding     06/26/24 1453     IP VTE HIGH RISK PATIENT  Once         06/26/24 1453     Place sequential compression device  Until discontinued         06/26/24 1453                    Discharge Planning   OXANA: 7/9/2024     Code Status: Full Code   Is the patient medically ready for discharge?:     Reason for patient still in hospital (select all that apply): Treatment and Pending disposition  Discharge Plan A: Long-term acute care facility (LTAC)   Discharge Delays: None known at this time              Ervin Valdovinos PA-C  Department of Hospital Medicine   [facility]

## 2024-07-08 NOTE — ASSESSMENT & PLAN NOTE
Patient with trial fibrillation which is controlled currently with Calcium Channel Blocker. Patient is currently in atrial fibrillation.[id number] Score: 4. . Anticoagulation done with Eliquis  -Restart Nifedipine XR at 60mg for tomorrow

## 2024-07-08 NOTE — NURSING
This RN at bedside to provide weight shifting assistance to pt. Pt refusing, states, he only wants to be turned every four hours. Pt education on importance and benefits to turn every two hours for optimal wound healing. Pt still refusing.

## 2024-07-09 LAB
BASOPHILS # BLD AUTO: 0.05 K/UL (ref 0–0.2)
BASOPHILS NFR BLD: 0.6 % (ref 0–1.9)
DIFFERENTIAL METHOD BLD: ABNORMAL
EOSINOPHIL # BLD AUTO: 1 K/UL (ref 0–0.5)
EOSINOPHIL NFR BLD: 12.7 % (ref 0–8)
ERYTHROCYTE [DISTWIDTH] IN BLOOD BY AUTOMATED COUNT: 18.4 % (ref 11.5–14.5)
HCT VFR BLD AUTO: 26.1 % (ref 40–54)
HGB BLD-MCNC: 8.2 G/DL (ref 14–18)
IMM GRANULOCYTES # BLD AUTO: 0.04 K/UL (ref 0–0.04)
IMM GRANULOCYTES NFR BLD AUTO: 0.5 % (ref 0–0.5)
LYMPHOCYTES # BLD AUTO: 1.3 K/UL (ref 1–4.8)
LYMPHOCYTES NFR BLD: 16.6 % (ref 18–48)
MCH RBC QN AUTO: 26.6 PG (ref 27–31)
MCHC RBC AUTO-ENTMCNC: 31.4 G/DL (ref 32–36)
MCV RBC AUTO: 85 FL (ref 82–98)
MONOCYTES # BLD AUTO: 0.7 K/UL (ref 0.3–1)
MONOCYTES NFR BLD: 8.9 % (ref 4–15)
NEUTROPHILS # BLD AUTO: 4.7 K/UL (ref 1.8–7.7)
NEUTROPHILS NFR BLD: 60.7 % (ref 38–73)
NRBC BLD-RTO: 0 /100 WBC
PLATELET # BLD AUTO: 410 K/UL (ref 150–450)
PMV BLD AUTO: 8.6 FL (ref 9.2–12.9)
POCT GLUCOSE: 194 MG/DL (ref 70–110)
POCT GLUCOSE: 206 MG/DL (ref 70–110)
POCT GLUCOSE: 222 MG/DL (ref 70–110)
POCT GLUCOSE: 278 MG/DL (ref 70–110)
RBC # BLD AUTO: 3.08 M/UL (ref 4.6–6.2)
WBC # BLD AUTO: 7.73 K/UL (ref 3.9–12.7)

## 2024-07-09 PROCEDURE — 21400001 HC TELEMETRY ROOM

## 2024-07-09 PROCEDURE — 25000003 PHARM REV CODE 250

## 2024-07-09 PROCEDURE — 63600175 PHARM REV CODE 636 W HCPCS: Performed by: HOSPITALIST

## 2024-07-09 PROCEDURE — 27000207 HC ISOLATION

## 2024-07-09 PROCEDURE — 25000003 PHARM REV CODE 250: Performed by: HOSPITALIST

## 2024-07-09 PROCEDURE — 99900035 HC TECH TIME PER 15 MIN (STAT)

## 2024-07-09 PROCEDURE — 63600175 PHARM REV CODE 636 W HCPCS

## 2024-07-09 PROCEDURE — A4216 STERILE WATER/SALINE, 10 ML: HCPCS | Performed by: NURSE PRACTITIONER

## 2024-07-09 PROCEDURE — 63600175 PHARM REV CODE 636 W HCPCS: Performed by: NURSE PRACTITIONER

## 2024-07-09 PROCEDURE — 85025 COMPLETE CBC W/AUTO DIFF WBC: CPT | Performed by: NURSE PRACTITIONER

## 2024-07-09 PROCEDURE — 25000003 PHARM REV CODE 250: Performed by: NURSE PRACTITIONER

## 2024-07-09 RX ORDER — ELECTROLYTES/DEXTROSE
SOLUTION, ORAL ORAL 2 TIMES DAILY
Status: DISCONTINUED | OUTPATIENT
Start: 2024-07-09 | End: 2024-07-11 | Stop reason: HOSPADM

## 2024-07-09 RX ORDER — HYDROCORTISONE 1 %
CREAM (GRAM) TOPICAL 2 TIMES DAILY
Status: DISCONTINUED | OUTPATIENT
Start: 2024-07-09 | End: 2024-07-09

## 2024-07-09 RX ADMIN — PANTOPRAZOLE SODIUM 40 MG: 40 INJECTION, POWDER, LYOPHILIZED, FOR SOLUTION INTRAVENOUS at 09:07

## 2024-07-09 RX ADMIN — GABAPENTIN 300 MG: 300 CAPSULE ORAL at 08:07

## 2024-07-09 RX ADMIN — HYDROCORTISONE: 0.01 CREAM TOPICAL at 06:07

## 2024-07-09 RX ADMIN — FERROUS SULFATE TAB 325 MG (65 MG ELEMENTAL FE) 1 EACH: 325 (65 FE) TAB at 09:07

## 2024-07-09 RX ADMIN — CEFEPIME 2 G: 2 INJECTION, POWDER, FOR SOLUTION INTRAVENOUS at 06:07

## 2024-07-09 RX ADMIN — Medication 10 ML: at 02:07

## 2024-07-09 RX ADMIN — Medication 10 ML: at 06:07

## 2024-07-09 RX ADMIN — HYDROCODONE BITARTRATE AND ACETAMINOPHEN 1 TABLET: 10; 325 TABLET ORAL at 09:07

## 2024-07-09 RX ADMIN — GABAPENTIN 300 MG: 300 CAPSULE ORAL at 09:07

## 2024-07-09 RX ADMIN — INSULIN ASPART 2 UNITS: 100 INJECTION, SOLUTION INTRAVENOUS; SUBCUTANEOUS at 12:07

## 2024-07-09 RX ADMIN — NIFEDIPINE 60 MG: 60 TABLET, FILM COATED, EXTENDED RELEASE ORAL at 09:07

## 2024-07-09 RX ADMIN — MORPHINE SULFATE 1 MG: 2 INJECTION, SOLUTION INTRAMUSCULAR; INTRAVENOUS at 03:07

## 2024-07-09 RX ADMIN — APIXABAN 5 MG: 5 TABLET, FILM COATED ORAL at 08:07

## 2024-07-09 RX ADMIN — Medication 10 ML: at 09:07

## 2024-07-09 RX ADMIN — VANCOMYCIN HYDROCHLORIDE 1250 MG: 1.25 INJECTION, POWDER, LYOPHILIZED, FOR SOLUTION INTRAVENOUS at 04:07

## 2024-07-09 RX ADMIN — TAMSULOSIN HYDROCHLORIDE 0.4 MG: 0.4 CAPSULE ORAL at 09:07

## 2024-07-09 RX ADMIN — ISOSORBIDE DINITRATE 10 MG: 10 TABLET ORAL at 02:07

## 2024-07-09 RX ADMIN — INSULIN ASPART 3 UNITS: 100 INJECTION, SOLUTION INTRAVENOUS; SUBCUTANEOUS at 04:07

## 2024-07-09 RX ADMIN — CEFEPIME 2 G: 2 INJECTION, POWDER, FOR SOLUTION INTRAVENOUS at 02:07

## 2024-07-09 RX ADMIN — CEFEPIME 2 G: 2 INJECTION, POWDER, FOR SOLUTION INTRAVENOUS at 09:07

## 2024-07-09 RX ADMIN — PANTOPRAZOLE SODIUM 40 MG: 40 INJECTION, POWDER, LYOPHILIZED, FOR SOLUTION INTRAVENOUS at 08:07

## 2024-07-09 RX ADMIN — INSULIN ASPART 1 UNITS: 100 INJECTION, SOLUTION INTRAVENOUS; SUBCUTANEOUS at 08:07

## 2024-07-09 RX ADMIN — ISOSORBIDE DINITRATE 10 MG: 10 TABLET ORAL at 09:07

## 2024-07-09 RX ADMIN — APIXABAN 5 MG: 5 TABLET, FILM COATED ORAL at 09:07

## 2024-07-09 RX ADMIN — ASPIRIN 81 MG: 81 TABLET, COATED ORAL at 09:07

## 2024-07-09 RX ADMIN — ISOSORBIDE DINITRATE 10 MG: 10 TABLET ORAL at 08:07

## 2024-07-09 NOTE — ASSESSMENT & PLAN NOTE
Patient's anemia is currently uncontrolled. Has 2 u PRBC ordered. Etiology likely d/t Iron deficiency.   Current CBC reviewed-   Lab Results   Component Value Date    HGB 8.2 (L) 07/09/2024    HCT 26.1 (L) 07/09/2024     Monitor serial CBC and transfuse if patient becomes hemodynamically unstable, symptomatic or H/H drops below 7/21.  -attempted blood transfusion yesterday however patient developed chest transfusion was stopped.  Will order 1 pack of packed red blood cells this morning and premedicate prior.  Orders for p.r.n. Lasix to be given in between units.  PPI BID  --H/H stable today  --consulted GI.  --s/p endoscopy on 6/28:  Impression:            - Normal esophagus.                          - Erosive gastropathy with no stigmata of recent                          bleeding. Biopsied.                          - Normal examined duodenum.   Recommendation:        - Return patient to hospital [facility] for ongoing care.                          - Resume previous diet.                          - Continue present medications.                          - Await pathology results.                          - Observe patient's clinical course.                          - Perform a colonoscopy at appointment to be                          scheduled.

## 2024-07-09 NOTE — PLAN OF CARE
Rounded at bedside. Plan is discharge to [facility] on tomorrow, waiting for Auth due to pt will be on 2 IV antibiotics (Cefepime and Vancomycin) and with Multiple wounds when discharged. CM will continue to follow pt during this hospital admission and provide any discharge needs.    07/09/24 1227   Rounds   Attendance Provider;Nurse    Discharge Plan A LTAC   Why the patient remains in the hospital Requires continued medical care   Transition of Care Barriers None

## 2024-07-09 NOTE — SUBJECTIVE & OBJECTIVE
Interval History:  Patient seen and examined on morning rounds.  Appreciate infectious disease recommendations on this patient.  Medically stable for discharge.  Awaiting LTAC placement    Review of Systems   Constitutional:  Positive for activity change, appetite change and fatigue. Negative for fever.   HENT:  Negative for trouble swallowing.    Respiratory:  Negative for cough.    Cardiovascular:  Negative for chest pain.   Gastrointestinal:  Negative for diarrhea, nausea and vomiting.   Genitourinary:  Negative for hematuria.   Musculoskeletal:  Positive for arthralgias and myalgias.   Skin:  Positive for wound.   Neurological:  Positive for weakness.   Psychiatric/Behavioral:  Negative for confusion.      Objective:     Vital Signs (Most Recent):  Temp: 98.8 °F (37.1 °C) (07/09/24 0750)  Pulse: 77 (07/09/24 0750)  Resp: 18 (07/09/24 0927)  BP: (!) 177/74 (07/09/24 0750)  SpO2: 97 % (07/09/24 0750) Vital Signs (24h Range):  Temp:  [98 °F (36.7 °C)-98.8 °F (37.1 °C)] 98.8 °F (37.1 °C)  Pulse:  [72-82] 77  Resp:  [18-20] 18  SpO2:  [97 %-99 %] 97 %  BP: (138-177)/(63-74) 177/74     Weight: 87.5 kg (192 lb 14.4 oz)  Body mass index is 28.49 kg/m².    Intake/Output Summary (Last 24 hours) at 7/9/2024 1107  Last data filed at 7/9/2024 1056  Gross per 24 hour   Intake 531.64 ml   Output 2050 ml   Net -1518.36 ml         Physical Exam  Vitals and nursing note reviewed.   HENT:      Head: Normocephalic and atraumatic.      Mouth/Throat:      Mouth: Mucous membranes are moist.   Eyes:      Extraocular Movements: Extraocular movements intact.   Cardiovascular:      Rate and Rhythm: Normal rate.      Pulses: Normal pulses.      Heart sounds: Normal heart sounds.   Pulmonary:      Effort: Pulmonary effort is normal.      Breath sounds: Normal breath sounds.   Abdominal:      General: Bowel sounds are normal. There is no distension.      Palpations: Abdomen is soft.      Tenderness: There is no abdominal tenderness. There is  no guarding.   Genitourinary:     Comments: Sams in place  Musculoskeletal:      Cervical back: Normal range of motion and neck supple.      Comments: Left AKA   Skin:     General: Skin is warm and dry.      Capillary Refill: Capillary refill takes 2 to 3 seconds.   Neurological:      Mental Status: He is alert and oriented to person, place, and time. Mental status is at baseline.      Motor: No weakness.   Psychiatric:         Mood and Affect: Mood normal.         Behavior: Behavior normal.             Significant Labs: All pertinent labs within the past 24 hours have been reviewed.    Significant Imaging: I have reviewed all pertinent imaging results/findings within the past 24 hours.

## 2024-07-09 NOTE — ASSESSMENT & PLAN NOTE
Chronic, uncontrolled. Latest blood pressure and vitals reviewed-     Temp:  [98 °F (36.7 °C)-98.8 °F (37.1 °C)]   Pulse:  [72-82]   Resp:  [18-20]   BP: (138-177)/(63-74)   SpO2:  [97 %-99 %] .   Home meds for hypertension were reviewed and noted below.   Hypertension Medications               furosemide (LASIX) 20 MG tablet Take 1 tablet (20 mg total) by mouth once daily.    NIFEdipine (PROCARDIA-XL) 90 MG (OSM) 24 hr tablet Take 1 tablet (90 mg total) by mouth once daily.    bumetanide (BUMEX) 0.5 MG Tab Take 0.5 mg by mouth daily as needed.    bumetanide (BUMEX) 1 MG tablet Take by mouth. unknown    hydrALAZINE (APRESOLINE) 100 MG tablet Take by mouth. unknown    isosorbide dinitrate (ISORDIL) 10 MG tablet Take 1 tablet (10 mg total) by mouth 3 (three) times daily. Hold until follow up with a provider            While in the hospital, will manage blood pressure as follows; will resume meds as tolerated     Will utilize p.r.n. blood pressure medication only if patient's blood pressure greater than 160/100 and he develops symptoms such as worsening chest pain or shortness of breath.

## 2024-07-09 NOTE — ASSESSMENT & PLAN NOTE
- Consulted ID, appreciate rec's  - IV abx ordered---Avycaz transitioned back to cefepime per ID, continue vancomycin.   - Blood cultures from 6/26 with no growth to date  - Bone culture grew Pseudomonas and MRSA  -Appreciate IDs plan below:  -will need 6 weeks of therapy  -while outpatient will need weekly cbc, cmp, crp ,and vanco trough faxed to [phone] (or can be monitored at nursing home  - Appreciate Podiatry assistance with I&D

## 2024-07-09 NOTE — ASSESSMENT & PLAN NOTE
Patient's FSGs are uncontrolled due to hyperglycemia on current medication regimen.  Last A1c reviewed-   Lab Results   Component Value Date    HGBA1C 6.8 (H) 06/26/2024     Most recent fingerstick glucose reviewed-   Recent Labs   Lab 07/08/24  1204 07/08/24  1622 07/08/24  2053 07/09/24  0544   POCTGLUCOSE 197* 212* 209* 194*       Current correctional scale  Low  Maintain anti-hyperglycemic dose as follows-   Antihyperglycemics (From admission, onward)    Start     Stop Route Frequency Ordered    06/26/24 1552  insulin aspart U-100 pen 0-5 Units         -- SubQ Before meals & nightly PRN 06/26/24 1453        Hold Oral hypoglycemics while patient is in the hospital.

## 2024-07-09 NOTE — ASSESSMENT & PLAN NOTE
Patient with trial fibrillation which is controlled currently with Calcium Channel Blocker. Patient is currently in atrial fibrillation.[id number] Score: 4. . Anticoagulation done with Eliquis  -resumed Nifedipine XR at 60mg

## 2024-07-09 NOTE — NURSING
Care assumed for patient. Denies any needs at this time. Patient laying on right side. With wedge pillow. Waffle mattress in place. Heart monitor in place. Sams with clear yellow urine. Bed alarm on

## 2024-07-09 NOTE — PLAN OF CARE
Problem: Adult Inpatient Plan of Care  Goal: Plan of Care Review  Outcome: Progressing  Goal: Absence of Hospital-Acquired Illness or Injury  Outcome: Progressing     Problem: Gastrointestinal Bleeding  Goal: Optimal Coping with Acute Illness  Outcome: Progressing     Problem: Diabetes Comorbidity  Goal: Blood Glucose Level Within Targeted Range  Outcome: Progressing     Problem: Sepsis/Septic Shock  Goal: Optimal Coping  Outcome: Progressing  Goal: Absence of Bleeding  Outcome: Progressing  Goal: Blood Glucose Level Within Targeted Range  Outcome: Progressing     Problem: Wound  Goal: Optimal Pain Control and Function  Outcome: Progressing   Patient AAox3 not to date. No complaints of pain during this shift. Patient turned Q4 hours as requested. Patient refusing to be turned Q 2hours during the night. Patient educated on importance of frequent turning to prevent worsening of wounds. Wedge and pillows used for repositioning. Waffle mattress in place. Right z flex boot in place. Old AKA to left leg, picc line to left arm. IV abx administered . Blood glucose monitored. Bed alarm on. Safety ensured

## 2024-07-09 NOTE — PROGRESS NOTES
Per discussion with pharmacy and primary team, would treat with Cefepime 2g iv q8hrs and Vancomycin 1,250mg iv (dosed to maintain trough 15-20). Stop dates and recommended labs remain the same

## 2024-07-09 NOTE — PROGRESS NOTES
[facility] Medicine  Progress Note    Patient Name: Saji Castañeda  MRN: [id number]  Patient Class: IP- Inpatient   Admission Date: 6/26/2024  Length of Stay: 12 days  Attending Physician: Carmen Hawkins*  Primary Care Provider: No primary care provider on file.        Subjective:     Principal Problem:Anemia        HPI:  Saji Castañeda is a 75 y/o M who has a pmh of Arthritis, Bacteremia due to Gram-negative bacteria, Diabetes mellitus, Diabetes mellitus, type 2, Hyperlipidemia, Hypertension, Osteomyelitis, and Palliative care encounter. presenting to the Emergency Department for referral. Patient was sent to the ED via EMS from [facility] for low blood count. Patient denies any chest pain, shortness on breath, weakness, fatigue, dizziness. His hemoglobin was 6.3--will order 1 u PRBC ordered. FOBT pending. Will admit to the [facility] service for further care.    Overview/Hospital Course:  He was admitted to the hospital medicine service for further care.  Patient noted with anemia as well as osteomyelitis on admission.  Patient underwent a EGD on 06/28 which noted Erosive gastropathy.  Anticoagulation is on hold until cleared by GI.  He did receive 1 unit of packed red blood cells on admission.  CT of his right foot noted with osteomyelitis.  Appreciate Podiatry is evaluation---status post bone biopsy on today---also appreciate infectious disease.    Interval History:  Patient seen and examined on morning rounds.  Appreciate infectious disease recommendations on this patient.  Medically stable for discharge.  Awaiting LTAC placement    Review of Systems   Constitutional:  Positive for activity change, appetite change and fatigue. Negative for fever.   HENT:  Negative for trouble swallowing.    Respiratory:  Negative for cough.    Cardiovascular:  Negative for chest pain.   Gastrointestinal:  Negative for diarrhea, nausea and vomiting.   Genitourinary:  Negative for hematuria.    Musculoskeletal:  Positive for arthralgias and myalgias.   Skin:  Positive for wound.   Neurological:  Positive for weakness.   Psychiatric/Behavioral:  Negative for confusion.      Objective:     Vital Signs (Most Recent):  Temp: 98.8 °F (37.1 °C) (07/09/24 0750)  Pulse: 77 (07/09/24 0750)  Resp: 18 (07/09/24 0927)  BP: (!) 177/74 (07/09/24 0750)  SpO2: 97 % (07/09/24 0750) Vital Signs (24h Range):  Temp:  [98 °F (36.7 °C)-98.8 °F (37.1 °C)] 98.8 °F (37.1 °C)  Pulse:  [72-82] 77  Resp:  [18-20] 18  SpO2:  [97 %-99 %] 97 %  BP: (138-177)/(63-74) 177/74     Weight: 87.5 kg (192 lb 14.4 oz)  Body mass index is 28.49 kg/m².    Intake/Output Summary (Last 24 hours) at 7/9/2024 1107  Last data filed at 7/9/2024 1056  Gross per 24 hour   Intake 531.64 ml   Output 2050 ml   Net -1518.36 ml         Physical Exam  Vitals and nursing note reviewed.   HENT:      Head: Normocephalic and atraumatic.      Mouth/Throat:      Mouth: Mucous membranes are moist.   Eyes:      Extraocular Movements: Extraocular movements intact.   Cardiovascular:      Rate and Rhythm: Normal rate.      Pulses: Normal pulses.      Heart sounds: Normal heart sounds.   Pulmonary:      Effort: Pulmonary effort is normal.      Breath sounds: Normal breath sounds.   Abdominal:      General: Bowel sounds are normal. There is no distension.      Palpations: Abdomen is soft.      Tenderness: There is no abdominal tenderness. There is no guarding.   Genitourinary:     Comments: Sams in place  Musculoskeletal:      Cervical back: Normal range of motion and neck supple.      Comments: Left AKA   Skin:     General: Skin is warm and dry.      Capillary Refill: Capillary refill takes 2 to 3 seconds.   Neurological:      Mental Status: He is alert and oriented to person, place, and time. Mental status is at baseline.      Motor: No weakness.   Psychiatric:         Mood and Affect: Mood normal.         Behavior: Behavior normal.             Significant Labs: All  pertinent labs within the past 24 hours have been reviewed.    Significant Imaging: I have reviewed all pertinent imaging results/findings within the past 24 hours.    Assessment/Plan:      * Anemia  Patient's anemia is currently uncontrolled. Has 2 u PRBC ordered. Etiology likely d/t Iron deficiency.   Current CBC reviewed-   Lab Results   Component Value Date    HGB 8.2 (L) 07/09/2024    HCT 26.1 (L) 07/09/2024     Monitor serial CBC and transfuse if patient becomes hemodynamically unstable, symptomatic or H/H drops below 7/21.  -attempted blood transfusion yesterday however patient developed chest transfusion was stopped.  Will order 1 pack of packed red blood cells this morning and premedicate prior.  Orders for p.r.n. Lasix to be given in between units.  PPI BID  --H/H stable today  --consulted GI.  --s/p endoscopy on 6/28:  Impression:            - Normal esophagus.                          - Erosive gastropathy with no stigmata of recent                          bleeding. Biopsied.                          - Normal examined duodenum.   Recommendation:        - [facility] patient to [facility] for ongoing care.                          - Resume previous diet.                          - Continue present medications.                          - Await pathology results.                          - Observe patient's clinical course.                          - Perform a colonoscopy at appointment to be                          scheduled.     Pressure injury of buttock, stage 4        Pressure injury of right heel, stage 4        Pressure injury of ankle, unstageable-lateral  Pressure injury of buttock, stage 4   Pressure injury of right heel, stage 4     - Appreciate IP wound care  - Pressure injury prevention interventions - waffle overlay and heel boot  - Vashe wet-to-dry to bilateral buttocks, R buttock, and R heel wound BID  - Mepilex border to L posterior thigh/gluteal fold 3x/week  - Podiatry consulted for R  heel wound   --also noted with a bilateral buttock full thickness pressure injury  --Right heel and sacrum CT ordered to r/o OM  --appreciate cardiology's evaluation      Wounds, multiple  -patient with wounds to his sacrum and heel  -wound care consulted  -Ulcer care ordered      Chronic anticoagulation  Continue home OAC      Stage 3b chronic kidney disease  Creatine stable for now. BMP reviewed- noted Estimated Creatinine Clearance: 87.3 mL/min (based on SCr of 0.8 mg/dL). according to latest data. Based on current GFR, CKD stage is stage 2 - GFR 60-89.  Monitor UOP and serial BMP and adjust therapy as needed. Renally dose meds. Avoid nephrotoxic medications and procedures.    Paroxysmal atrial fibrillation  Patient with trial fibrillation which is controlled currently with Calcium Channel Blocker. Patient is currently in atrial fibrillation.[id number] Score: 4. . Anticoagulation done with Eliquis  -resumed Nifedipine XR at 60mg     Osteomyelitis of right lower extremity  - Consulted ID, appreciate rec's  - IV abx ordered---Avycaz transitioned back to cefepime per ID, continue vancomycin.   - Blood cultures from 6/26 with no growth to date  - Bone culture grew Pseudomonas and MRSA  -Appreciate IDs plan below:  -will need 6 weeks of therapy  -while outpatient will need weekly cbc, cmp, crp ,and vanco trough faxed to [phone] (or can be monitored at nursing home  - Appreciate Podiatry assistance with I&D        Chronic deep vein thrombosis (DVT) of right upper extremity  -home AC resumed  -follow      PVD (peripheral vascular disease)        Peripheral arterial disease  PVD (peripheral vascular disease)    -continue OAC and ASA      Type 2 diabetes mellitus, with long-term current use of insulin    Patient's FSGs are uncontrolled due to hyperglycemia on current medication regimen.  Last A1c reviewed-   Lab Results   Component Value Date    HGBA1C 6.8 (H) 06/26/2024     Most recent fingerstick glucose reviewed-    Recent Labs   Lab 07/08/24  1204 07/08/24  1622 07/08/24 2053 07/09/24  0544   POCTGLUCOSE 197* 212* 209* 194*       Current correctional scale  Low  Maintain anti-hyperglycemic dose as follows-   Antihyperglycemics (From admission, onward)      Start     Stop Route Frequency Ordered    06/26/24 1552  insulin aspart U-100 pen 0-5 Units         -- SubQ Before meals & nightly PRN 06/26/24 1453          Hold Oral hypoglycemics while patient is in the hospital.      Essential hypertension  Chronic, uncontrolled. Latest blood pressure and vitals reviewed-     Temp:  [98 °F (36.7 °C)-98.8 °F (37.1 °C)]   Pulse:  [72-82]   Resp:  [18-20]   BP: (138-177)/(63-74)   SpO2:  [97 %-99 %] .   Home meds for hypertension were reviewed and noted below.   Hypertension Medications               furosemide (LASIX) 20 MG tablet Take 1 tablet (20 mg total) by mouth once daily.    NIFEdipine (PROCARDIA-XL) 90 MG (OSM) 24 hr tablet Take 1 tablet (90 mg total) by mouth once daily.    bumetanide (BUMEX) 0.5 MG Tab Take 0.5 mg by mouth daily as needed.    bumetanide (BUMEX) 1 MG tablet Take by mouth. unknown    hydrALAZINE (APRESOLINE) 100 MG tablet Take by mouth. unknown    isosorbide dinitrate (ISORDIL) 10 MG tablet Take 1 tablet (10 mg total) by mouth 3 (three) times daily. Hold until follow up with a provider            While in the hospital, will manage blood pressure as follows; will resume meds as tolerated     Will utilize p.r.n. blood pressure medication only if patient's blood pressure greater than 160/100 and he develops symptoms such as worsening chest pain or shortness of breath.      VTE Risk Mitigation (From admission, onward)           Ordered     apixaban tablet 5 mg  2 times daily         07/01/24 2139     Reason for No Pharmacological VTE Prophylaxis  Once        Question:  Reasons:  Answer:  Risk of Bleeding    06/26/24 1453     IP VTE HIGH RISK PATIENT  Once         06/26/24 1453     Place sequential compression device   Until discontinued         06/26/24 1453                    Discharge Planning   OXANA: 7/9/2024     Code Status: Full Code   Is the patient medically ready for discharge?:     Reason for patient still in hospital (select all that apply): Treatment, Imaging, Consult recommendations, and PT / OT recommendations  Discharge Plan A: Long-term acute care facility (LTAC)   Discharge Delays: None known at this time              Lo Melgar NP  Department of Hospital Medicine   [facility]

## 2024-07-10 LAB
BASOPHILS # BLD AUTO: 0.07 K/UL (ref 0–0.2)
BASOPHILS NFR BLD: 0.8 % (ref 0–1.9)
DIFFERENTIAL METHOD BLD: ABNORMAL
EOSINOPHIL # BLD AUTO: 1.3 K/UL (ref 0–0.5)
EOSINOPHIL NFR BLD: 14.3 % (ref 0–8)
ERYTHROCYTE [DISTWIDTH] IN BLOOD BY AUTOMATED COUNT: 18.3 % (ref 11.5–14.5)
HCT VFR BLD AUTO: 26.9 % (ref 40–54)
HGB BLD-MCNC: 8.3 G/DL (ref 14–18)
IMM GRANULOCYTES # BLD AUTO: 0.04 K/UL (ref 0–0.04)
IMM GRANULOCYTES NFR BLD AUTO: 0.4 % (ref 0–0.5)
LYMPHOCYTES # BLD AUTO: 1.4 K/UL (ref 1–4.8)
LYMPHOCYTES NFR BLD: 15.5 % (ref 18–48)
MCH RBC QN AUTO: 26.1 PG (ref 27–31)
MCHC RBC AUTO-ENTMCNC: 30.9 G/DL (ref 32–36)
MCV RBC AUTO: 85 FL (ref 82–98)
MONOCYTES # BLD AUTO: 0.8 K/UL (ref 0.3–1)
MONOCYTES NFR BLD: 8.4 % (ref 4–15)
NEUTROPHILS # BLD AUTO: 5.4 K/UL (ref 1.8–7.7)
NEUTROPHILS NFR BLD: 60.6 % (ref 38–73)
NRBC BLD-RTO: 0 /100 WBC
PLATELET # BLD AUTO: 409 K/UL (ref 150–450)
PMV BLD AUTO: 8.9 FL (ref 9.2–12.9)
POCT GLUCOSE: 175 MG/DL (ref 70–110)
POCT GLUCOSE: 202 MG/DL (ref 70–110)
POCT GLUCOSE: 204 MG/DL (ref 70–110)
POCT GLUCOSE: 223 MG/DL (ref 70–110)
RBC # BLD AUTO: 3.18 M/UL (ref 4.6–6.2)
VANCOMYCIN TROUGH SERPL-MCNC: 17 UG/ML (ref 10–22)
WBC # BLD AUTO: 8.96 K/UL (ref 3.9–12.7)

## 2024-07-10 PROCEDURE — 63600175 PHARM REV CODE 636 W HCPCS: Performed by: HOSPITALIST

## 2024-07-10 PROCEDURE — 85025 COMPLETE CBC W/AUTO DIFF WBC: CPT | Performed by: NURSE PRACTITIONER

## 2024-07-10 PROCEDURE — 94761 N-INVAS EAR/PLS OXIMETRY MLT: CPT

## 2024-07-10 PROCEDURE — A4216 STERILE WATER/SALINE, 10 ML: HCPCS | Performed by: NURSE PRACTITIONER

## 2024-07-10 PROCEDURE — 25000003 PHARM REV CODE 250

## 2024-07-10 PROCEDURE — 63600175 PHARM REV CODE 636 W HCPCS

## 2024-07-10 PROCEDURE — 80202 ASSAY OF VANCOMYCIN: CPT | Performed by: FAMILY MEDICINE

## 2024-07-10 PROCEDURE — 63600175 PHARM REV CODE 636 W HCPCS: Performed by: NURSE PRACTITIONER

## 2024-07-10 PROCEDURE — 25000003 PHARM REV CODE 250: Performed by: HOSPITALIST

## 2024-07-10 PROCEDURE — 27000207 HC ISOLATION

## 2024-07-10 PROCEDURE — 99900035 HC TECH TIME PER 15 MIN (STAT)

## 2024-07-10 PROCEDURE — 25000003 PHARM REV CODE 250: Performed by: NURSE PRACTITIONER

## 2024-07-10 PROCEDURE — 21400001 HC TELEMETRY ROOM

## 2024-07-10 RX ADMIN — PANTOPRAZOLE SODIUM 40 MG: 40 INJECTION, POWDER, LYOPHILIZED, FOR SOLUTION INTRAVENOUS at 09:07

## 2024-07-10 RX ADMIN — GABAPENTIN 300 MG: 300 CAPSULE ORAL at 09:07

## 2024-07-10 RX ADMIN — ASPIRIN 81 MG: 81 TABLET, COATED ORAL at 09:07

## 2024-07-10 RX ADMIN — Medication 10 ML: at 02:07

## 2024-07-10 RX ADMIN — ISOSORBIDE DINITRATE 10 MG: 10 TABLET ORAL at 02:07

## 2024-07-10 RX ADMIN — Medication 10 ML: at 05:07

## 2024-07-10 RX ADMIN — APIXABAN 5 MG: 5 TABLET, FILM COATED ORAL at 09:07

## 2024-07-10 RX ADMIN — CEFEPIME 2 G: 2 INJECTION, POWDER, FOR SOLUTION INTRAVENOUS at 09:07

## 2024-07-10 RX ADMIN — INSULIN ASPART 2 UNITS: 100 INJECTION, SOLUTION INTRAVENOUS; SUBCUTANEOUS at 04:07

## 2024-07-10 RX ADMIN — CEFEPIME 2 G: 2 INJECTION, POWDER, FOR SOLUTION INTRAVENOUS at 02:07

## 2024-07-10 RX ADMIN — INSULIN ASPART 2 UNITS: 100 INJECTION, SOLUTION INTRAVENOUS; SUBCUTANEOUS at 05:07

## 2024-07-10 RX ADMIN — Medication 10 ML: at 10:07

## 2024-07-10 RX ADMIN — ISOSORBIDE DINITRATE 10 MG: 10 TABLET ORAL at 09:07

## 2024-07-10 RX ADMIN — CEFEPIME 2 G: 2 INJECTION, POWDER, FOR SOLUTION INTRAVENOUS at 05:07

## 2024-07-10 RX ADMIN — NIFEDIPINE 60 MG: 60 TABLET, FILM COATED, EXTENDED RELEASE ORAL at 09:07

## 2024-07-10 RX ADMIN — DOCUSATE SODIUM AND SENNOSIDES 1 TABLET: 8.6; 5 TABLET, FILM COATED ORAL at 09:07

## 2024-07-10 RX ADMIN — TAMSULOSIN HYDROCHLORIDE 0.4 MG: 0.4 CAPSULE ORAL at 09:07

## 2024-07-10 RX ADMIN — HYDROCODONE BITARTRATE AND ACETAMINOPHEN 1 TABLET: 10; 325 TABLET ORAL at 09:07

## 2024-07-10 RX ADMIN — VANCOMYCIN HYDROCHLORIDE 1250 MG: 1.25 INJECTION, POWDER, LYOPHILIZED, FOR SOLUTION INTRAVENOUS at 05:07

## 2024-07-10 RX ADMIN — HYDROCORTISONE: 0.01 CREAM TOPICAL at 01:07

## 2024-07-10 RX ADMIN — INSULIN ASPART 1 UNITS: 100 INJECTION, SOLUTION INTRAVENOUS; SUBCUTANEOUS at 09:07

## 2024-07-10 RX ADMIN — FERROUS SULFATE TAB 325 MG (65 MG ELEMENTAL FE) 1 EACH: 325 (65 FE) TAB at 09:07

## 2024-07-10 NOTE — ASSESSMENT & PLAN NOTE
Patient's FSGs are uncontrolled due to hyperglycemia on current medication regimen.  Last A1c reviewed-   Lab Results   Component Value Date    HGBA1C 6.8 (H) 06/26/2024     Most recent fingerstick glucose reviewed-   Recent Labs   Lab 07/09/24  1151 07/09/24  1603 07/09/24  2034 07/10/24  0523   POCTGLUCOSE 206* 278* 222* 202*       Current correctional scale  Low  Maintain anti-hyperglycemic dose as follows-   Antihyperglycemics (From admission, onward)    Start     Stop Route Frequency Ordered    06/26/24 1552  insulin aspart U-100 pen 0-5 Units         -- SubQ Before meals & nightly PRN 06/26/24 1453        Hold Oral hypoglycemics while patient is in the hospital.

## 2024-07-10 NOTE — ASSESSMENT & PLAN NOTE
Creatine stable for now. BMP reviewed- noted Estimated Creatinine Clearance: 79.8 mL/min (based on SCr of 0.8 mg/dL). according to latest data. Based on current GFR, CKD stage is stage 2 - GFR 60-89.  Monitor UOP and serial BMP and adjust therapy as needed. Renally dose meds. Avoid nephrotoxic medications and procedures.

## 2024-07-10 NOTE — PROGRESS NOTES
[facility] Medicine  Progress Note    Patient Name: Saji Castañeda  MRN: [id number]  Patient Class: IP- Inpatient   Admission Date: 6/26/2024  Length of Stay: 13 days  Attending Physician: Carmen Hawkins*  Primary Care Provider: No primary care provider on file.        Subjective:     Principal Problem:Anemia        HPI:  Saji Castañeda is a 73 y/o M who has a pmh of Arthritis, Bacteremia due to Gram-negative bacteria, Diabetes mellitus, Diabetes mellitus, type 2, Hyperlipidemia, Hypertension, Osteomyelitis, and Palliative care encounter. presenting to the Emergency Department for referral. Patient was sent to the ED via EMS from [facility] for low blood count. Patient denies any chest pain, shortness on breath, weakness, fatigue, dizziness. His hemoglobin was 6.3--will order 1 u PRBC ordered. FOBT pending. Will admit to the [facility] service for further care.    Overview/Hospital Course:  He was admitted to the hospital medicine service for further care.  Patient noted with anemia as well as osteomyelitis on admission.  Patient underwent a EGD on 06/28 which noted Erosive gastropathy.  Anticoagulation is on hold until cleared by GI.  He did receive 1 unit of packed red blood cells on admission.  CT of his right foot noted with osteomyelitis.  Appreciate Podiatry is evaluation---status post bone biopsy on today---also appreciate infectious disease.    Interval History:  Patient seen and examined on morning rounds.  Peer to.  Was conducted on today and he was denied LTAC placement.  Will attempt SNF placement.    Review of Systems   Constitutional:  Positive for activity change, appetite change and fatigue. Negative for fever.   HENT:  Negative for trouble swallowing.    Respiratory:  Negative for cough.    Cardiovascular:  Negative for chest pain.   Gastrointestinal:  Negative for diarrhea, nausea and vomiting.   Genitourinary:  Negative for hematuria.   Musculoskeletal:   Positive for arthralgias and myalgias.   Skin:  Positive for wound.   Neurological:  Positive for weakness.   Psychiatric/Behavioral:  Negative for confusion.      Objective:     Vital Signs (Most Recent):  Temp: 98.2 °F (36.8 °C) (07/10/24 0800)  Pulse: 80 (07/10/24 0800)  Resp: 18 (07/10/24 0908)  BP: (!) 159/65 (07/10/24 0800)  SpO2: 96 % (07/10/24 0800) Vital Signs (24h Range):  Temp:  [98.2 °F (36.8 °C)-98.4 °F (36.9 °C)] 98.2 °F (36.8 °C)  Pulse:  [74-83] 80  Resp:  [18-20] 18  SpO2:  [96 %-99 %] 96 %  BP: (108-159)/(58-71) 159/65     Weight: 84.2 kg (185 lb 10 oz)  Body mass index is 27.41 kg/m².    Intake/Output Summary (Last 24 hours) at 7/10/2024 1051  Last data filed at 7/10/2024 0909  Gross per 24 hour   Intake 803.79 ml   Output 1850 ml   Net -1046.21 ml         Physical Exam  Vitals and nursing note reviewed.   HENT:      Head: Normocephalic and atraumatic.      Mouth/Throat:      Mouth: Mucous membranes are moist.   Eyes:      Extraocular Movements: Extraocular movements intact.   Cardiovascular:      Rate and Rhythm: Normal rate.      Pulses: Normal pulses.      Heart sounds: Normal heart sounds.   Pulmonary:      Effort: Pulmonary effort is normal.      Breath sounds: Normal breath sounds.   Abdominal:      General: Bowel sounds are normal. There is no distension.      Palpations: Abdomen is soft.      Tenderness: There is no abdominal tenderness. There is no guarding.   Genitourinary:     Comments: Sams in place  Musculoskeletal:      Cervical back: Normal range of motion and neck supple.      Comments: Left AKA   Skin:     General: Skin is warm and dry.      Capillary Refill: Capillary refill takes 2 to 3 seconds.   Neurological:      Mental Status: He is alert and oriented to person, place, and time. Mental status is at baseline.      Motor: No weakness.   Psychiatric:         Mood and Affect: Mood normal.         Behavior: Behavior normal.             Significant Labs: All pertinent labs within  the past 24 hours have been reviewed.    Significant Imaging: I have reviewed all pertinent imaging results/findings within the past 24 hours.    Assessment/Plan:      * Anemia  Patient's anemia is currently uncontrolled. Has 2 u PRBC ordered. Etiology likely d/t Iron deficiency.   Current CBC reviewed-   Lab Results   Component Value Date    HGB 8.3 (L) 07/10/2024    HCT 26.9 (L) 07/10/2024     Monitor serial CBC and transfuse if patient becomes hemodynamically unstable, symptomatic or H/H drops below 7/21.  -attempted blood transfusion yesterday however patient developed chest transfusion was stopped.  Will order 1 pack of packed red blood cells this morning and premedicate prior.  Orders for p.r.n. Lasix to be given in between units.  PPI BID  --H/H stable today  --consulted GI.  --s/p endoscopy on 6/28:  Impression:            - Normal esophagus.                          - Erosive gastropathy with no stigmata of recent                          bleeding. Biopsied.                          - Normal examined duodenum.   Recommendation:        - Return patient to [facility] for ongoing care.                          - Resume previous diet.                          - Continue present medications.                          - Await pathology results.                          - Observe patient's clinical course.                          - Perform a colonoscopy at appointment to be                          scheduled.         Pressure injury of ankle, unstageable-lateral  Pressure injury of buttock, stage 4   Pressure injury of right heel, stage 4     - Appreciate IP wound care  - Pressure injury prevention interventions - waffle overlay and heel boot  - Vashe wet-to-dry to bilateral buttocks, R buttock, and R heel wound BID  - Mepilex border to L posterior thigh/gluteal fold 3x/week  - Podiatry consulted for R heel wound   --also noted with a bilateral buttock full thickness pressure injury  --Right heel and sacrum CT  ordered to r/o OM  --appreciate cardiology's evaluation      Wounds, multiple  -patient with wounds to his sacrum and heel  -wound care consulted  -Ulcer care ordered      Chronic anticoagulation  Continue home OAC      Stage 3b chronic kidney disease  Creatine stable for now. BMP reviewed- noted Estimated Creatinine Clearance: 79.8 mL/min (based on SCr of 0.8 mg/dL). according to latest data. Based on current GFR, CKD stage is stage 2 - GFR 60-89.  Monitor UOP and serial BMP and adjust therapy as needed. Renally dose meds. Avoid nephrotoxic medications and procedures.    Paroxysmal atrial fibrillation  Patient with trial fibrillation which is controlled currently with Calcium Channel Blocker. Patient is currently in atrial fibrillation.[id number] Score: 4. . Anticoagulation done with Eliquis  -resumed Nifedipine XR at 60mg     Osteomyelitis of right lower extremity  - Consulted ID, appreciate rec's  - IV abx ordered---Avycaz transitioned back to cefepime per ID, continue vancomycin.   - Blood cultures from 6/26 with no growth to date  - Bone culture grew Pseudomonas and MRSA  -Appreciate IDs plan below:  -will need 6 weeks of therapy  -while outpatient will need weekly cbc, cmp, crp ,and vanco trough faxed to [phone] (or can be monitored at nursing home  - Appreciate Podiatry assistance with I&D        Chronic deep vein thrombosis (DVT) of right upper extremity  -home AC resumed  -follow      Peripheral arterial disease  PVD (peripheral vascular disease)    -continue OAC and ASA      Type 2 diabetes mellitus, with long-term current use of insulin    Patient's FSGs are uncontrolled due to hyperglycemia on current medication regimen.  Last A1c reviewed-   Lab Results   Component Value Date    HGBA1C 6.8 (H) 06/26/2024     Most recent fingerstick glucose reviewed-   Recent Labs   Lab 07/09/24  1151 07/09/24  1603 07/09/24  2034 07/10/24  0523   POCTGLUCOSE 206* 278* 222* 202*       Current correctional scale   Low  Maintain anti-hyperglycemic dose as follows-   Antihyperglycemics (From admission, onward)      Start     Stop Route Frequency Ordered    06/26/24 1552  insulin aspart U-100 pen 0-5 Units         -- SubQ Before meals & nightly PRN 06/26/24 1453          Hold Oral hypoglycemics while patient is in the hospital.      Essential hypertension  Chronic, uncontrolled. Latest blood pressure and vitals reviewed-     Temp:  [98.2 °F (36.8 °C)-98.4 °F (36.9 °C)]   Pulse:  [74-83]   Resp:  [18-20]   BP: (108-159)/(58-71)   SpO2:  [96 %-99 %] .   Home meds for hypertension were reviewed and noted below.   Hypertension Medications               furosemide (LASIX) 20 MG tablet Take 1 tablet (20 mg total) by mouth once daily.    NIFEdipine (PROCARDIA-XL) 90 MG (OSM) 24 hr tablet Take 1 tablet (90 mg total) by mouth once daily.    bumetanide (BUMEX) 0.5 MG Tab Take 0.5 mg by mouth daily as needed.    bumetanide (BUMEX) 1 MG tablet Take by mouth. unknown    hydrALAZINE (APRESOLINE) 100 MG tablet Take by mouth. unknown    isosorbide dinitrate (ISORDIL) 10 MG tablet Take 1 tablet (10 mg total) by mouth 3 (three) times daily. Hold until follow up with a provider            While in the hospital, will manage blood pressure as follows; will resume meds as tolerated     Will utilize p.r.n. blood pressure medication only if patient's blood pressure greater than 160/100 and he develops symptoms such as worsening chest pain or shortness of breath.      VTE Risk Mitigation (From admission, onward)           Ordered     apixaban tablet 5 mg  2 times daily         07/01/24 2139     Reason for No Pharmacological VTE Prophylaxis  Once        Question:  Reasons:  Answer:  Risk of Bleeding    06/26/24 1453     IP VTE HIGH RISK PATIENT  Once         06/26/24 1453     Place sequential compression device  Until discontinued         06/26/24 1453                    Discharge Planning   OXANA: 7/9/2024     Code Status: Full Code   Is the patient  medically ready for discharge?:     Reason for patient still in hospital (select all that apply): Laboratory test, Treatment, Imaging, Consult recommendations, and PT / OT recommendations  Discharge Plan A: Return to nursing home   Discharge Delays: None known at this time              Lo Melgar NP  Department of Hospital Medicine   [facility]

## 2024-07-10 NOTE — PLAN OF CARE
Problem: Adult Inpatient Plan of Care  Goal: Plan of Care Review  Outcome: Progressing  Chart check complete. Vitals, orders, labs, and progress notes reviewed. Care plan updated. Will monitor.   '

## 2024-07-10 NOTE — PLAN OF CARE
Resting on waffle mattress,no current c/o pain,turned prn with wedge as pt allowed,wound care to buttocks as ordered,RLE dressing intact with green zflex boot in use,incontinence care given after bm,poc reviewed,bed alarm is set.

## 2024-07-10 NOTE — ASSESSMENT & PLAN NOTE
Patient with trial fibrillation which is controlled currently with Calcium Channel Blocker. Patient is currently in atrial fibrillation.[id number] Score: 4. . Anticoagulation done with Eliquis  -resumed Nifedipine XR at 60mg

## 2024-07-10 NOTE — PLAN OF CARE
P2P was done with Lo BELLA and Dr Hernandez for LTAC placement at [facility], pt was denied. Spoke with Bryce at [facility] and requested her to submit for SNF Auth. Plan is for discharge tomorrow back to [facility] on SNF for wounds and 2 IV antibiotics. CM will continue to follow pt during this hospital admission and continue to provide dc needs.    07/10/24 1106   Rounds   Attendance Provider;Nurse    Discharge Plan A Return to nursing home   Why the patient remains in the hospital Insurance issues   Transition of Care Barriers None

## 2024-07-10 NOTE — SUBJECTIVE & OBJECTIVE
Interval History:  Patient seen and examined on morning rounds.  Peer to.  Was conducted on today and he was denied LTAC placement.  Will attempt SNF placement.    Review of Systems   Constitutional:  Positive for activity change, appetite change and fatigue. Negative for fever.   HENT:  Negative for trouble swallowing.    Respiratory:  Negative for cough.    Cardiovascular:  Negative for chest pain.   Gastrointestinal:  Negative for diarrhea, nausea and vomiting.   Genitourinary:  Negative for hematuria.   Musculoskeletal:  Positive for arthralgias and myalgias.   Skin:  Positive for wound.   Neurological:  Positive for weakness.   Psychiatric/Behavioral:  Negative for confusion.      Objective:     Vital Signs (Most Recent):  Temp: 98.2 °F (36.8 °C) (07/10/24 0800)  Pulse: 80 (07/10/24 0800)  Resp: 18 (07/10/24 0908)  BP: (!) 159/65 (07/10/24 0800)  SpO2: 96 % (07/10/24 0800) Vital Signs (24h Range):  Temp:  [98.2 °F (36.8 °C)-98.4 °F (36.9 °C)] 98.2 °F (36.8 °C)  Pulse:  [74-83] 80  Resp:  [18-20] 18  SpO2:  [96 %-99 %] 96 %  BP: (108-159)/(58-71) 159/65     Weight: 84.2 kg (185 lb 10 oz)  Body mass index is 27.41 kg/m².    Intake/Output Summary (Last 24 hours) at 7/10/2024 1051  Last data filed at 7/10/2024 0909  Gross per 24 hour   Intake 803.79 ml   Output 1850 ml   Net -1046.21 ml         Physical Exam  Vitals and nursing note reviewed.   HENT:      Head: Normocephalic and atraumatic.      Mouth/Throat:      Mouth: Mucous membranes are moist.   Eyes:      Extraocular Movements: Extraocular movements intact.   Cardiovascular:      Rate and Rhythm: Normal rate.      Pulses: Normal pulses.      Heart sounds: Normal heart sounds.   Pulmonary:      Effort: Pulmonary effort is normal.      Breath sounds: Normal breath sounds.   Abdominal:      General: Bowel sounds are normal. There is no distension.      Palpations: Abdomen is soft.      Tenderness: There is no abdominal tenderness. There is no guarding.    Genitourinary:     Comments: Sams in place  Musculoskeletal:      Cervical back: Normal range of motion and neck supple.      Comments: Left AKA   Skin:     General: Skin is warm and dry.      Capillary Refill: Capillary refill takes 2 to 3 seconds.   Neurological:      Mental Status: He is alert and oriented to person, place, and time. Mental status is at baseline.      Motor: No weakness.   Psychiatric:         Mood and Affect: Mood normal.         Behavior: Behavior normal.             Significant Labs: All pertinent labs within the past 24 hours have been reviewed.    Significant Imaging: I have reviewed all pertinent imaging results/findings within the past 24 hours.

## 2024-07-10 NOTE — PLAN OF CARE
Recommendations  Recommendation:   1. Add Kvng and beneprotein BID to promote wound healing.   2. Encourage intake at meals as tolerated.   3. Monitor weight/labs.   4. RD to continue to follow to monitor po intake    Goals: Pt will tolerate diet with at least 75% intake at meals by RD follow up    Nutrition Goal Status: progressing towards goal  Communication of RD Recs: other (comment) (POC)

## 2024-07-10 NOTE — ASSESSMENT & PLAN NOTE
Chronic, uncontrolled. Latest blood pressure and vitals reviewed-     Temp:  [98.2 °F (36.8 °C)-98.4 °F (36.9 °C)]   Pulse:  [74-83]   Resp:  [18-20]   BP: (108-159)/(58-71)   SpO2:  [96 %-99 %] .   Home meds for hypertension were reviewed and noted below.   Hypertension Medications               furosemide (LASIX) 20 MG tablet Take 1 tablet (20 mg total) by mouth once daily.    NIFEdipine (PROCARDIA-XL) 90 MG (OSM) 24 hr tablet Take 1 tablet (90 mg total) by mouth once daily.    bumetanide (BUMEX) 0.5 MG Tab Take 0.5 mg by mouth daily as needed.    bumetanide (BUMEX) 1 MG tablet Take by mouth. unknown    hydrALAZINE (APRESOLINE) 100 MG tablet Take by mouth. unknown    isosorbide dinitrate (ISORDIL) 10 MG tablet Take 1 tablet (10 mg total) by mouth 3 (three) times daily. Hold until follow up with a provider            While in the hospital, will manage blood pressure as follows; will resume meds as tolerated     Will utilize p.r.n. blood pressure medication only if patient's blood pressure greater than 160/100 and he develops symptoms such as worsening chest pain or shortness of breath.

## 2024-07-10 NOTE — ASSESSMENT & PLAN NOTE
- Consulted ID, appreciate rec's  - IV abx ordered---Avycaz transitioned back to cefepime per ID, continue vancomycin.   - Blood cultures from 6/26 with no growth to date  - Bone culture grew Pseudomonas and MRSA  -Appreciate IDs plan below:  -will need 6 weeks of therapy  -while outpatient will need weekly cbc, cmp, crp ,and vanco trough faxed to [phone] (or can be monitored at nursing home  - Appreciate Podiatry assistance with I&D

## 2024-07-10 NOTE — PROGRESS NOTES
[facility] - Med Surg  Adult Nutrition  Progress Note    SUMMARY     Recommendations  Recommendation:   1. Add Kvng and beneprotein BID to promote wound healing.   2. Encourage intake at meals as tolerated.   3. Monitor weight/labs.   4. RD to continue to follow to monitor po intake    Goals: Pt will tolerate diet with at least 75% intake at meals by RD follow up    Nutrition Goal Status: progressing towards goal  Communication of RD Recs: other (comment) (POC)    Assessment and Plan  Nutrition Problem  Increased protein needs    Related to (etiology):   Wound healing     Signs and Symptoms (as evidenced by):   R toe, R heel, B buttocks, L ischial wounds      Interventions:  Collaboration with other providers  Increase protein diet     Nutrition Diagnosis Status:   Continues    Malnutrition Assessment  Weight Loss (Malnutrition):  (23% x 6 months)   Orbital Region (Subcutaneous Fat Loss): well nourished  Upper Arm Region (Subcutaneous Fat Loss): well nourished  Thoracic and Lumbar Region: well nourished   Sabianism Region (Muscle Loss): well nourished  Clavicle Bone Region (Muscle Loss): well nourished  Clavicle and Acromion Bone Region (Muscle Loss): well nourished  Scapular Bone Region (Muscle Loss): well nourished  Dorsal Hand (Muscle Loss): well nourished  Patellar Region (Muscle Loss): well nourished  Anterior Thigh Region (Muscle Loss): well nourished  Posterior Calf Region (Muscle Loss): well nourished     Reason for Assessment  Reason For Assessment: RD follow-up  Diagnosis:  (anemia)  Relevant Medical History: arthritis, DM, HLD, osteomyelitis, HTN, pacemaker, L AKA  General Information Comments: Pt is currently on a diabetic/cardiac 2000 kcal diet. Tomas-13/left buttocks, right posterior heel, buttocks, right buttocks. Noted 100% meal intake. Noted appetite change. Pt waiting LTAC placement. Noted 78 lb weight loss in 3 months. Unable to conduct NFPE at this time, pt on contact precautions.  Nutrition Discharge  "Planning: d/c on diabetic/cardiac 2000 diet    Nutrition Risk Screen  Nutrition Risk Screen: large or nonhealing wound, burn or pressure injury    Nutrition/Diet History  Food Preferences: no Rastafari or cultural food prefs identified  Spiritual, Cultural Beliefs, Temple Practices, Values that Affect Care: no  Factors Affecting Nutritional Intake: altered gastrointestinal function    Nutrition Related Social Determinants of Health: SDOH: Unable to assess at this time.     Anthropometrics  Temp: 97.3 °F (36.3 °C)  Height Method: Stated  Height: 5' 9" (175.3 cm)  Height (inches): 69 in  Weight Method: Bed Scale  Weight: 84.2 kg (185 lb 10 oz)  Weight (lb): 185.63 lb  Ideal Body Weight (IBW), Male: 160 lb  % Ideal Body Weight, Male (lb): 126.21 %  BMI (Calculated): 27.4  BMI Grade: 25 - 29.9 - overweight  Usual Body Weight (UBW), k.7 kg (12/15)  % Usual Body Weight: 76.68  % Weight Change From Usual Weight: -23.48 %  Amputation %: 16  Total Amputation %: 16  Amputation Ideal Body Weight (IBW), Male (lb): 144 lb  Amputee BMI (kg/m2): 34.01 kg/m2     Lab/Procedures/Meds  Pertinent Labs Reviewed: reviewed  Pertinent Labs Comments: CO2 31, Ca 8.5, Glucose 169, ALP 51  Pertinent Medications Reviewed: reviewed  Pertinent Medications Comments: ferrous sulfate, gabapentin, pantoprazole, zosyn, senna    Estimated/Assessed Needs  Weight Used For Calorie Calculations: 72.7 kg (160 lb 4.4 oz) (IBW pre amputation)  Energy Calorie Requirements (kcal): 2137 (35 kcal/kg IBW with AKA)  Energy Need Method: Kcal/kg  Protein Requirements: 79g (1.3g/kg IBW with amputation)  Weight Used For Protein Calculations: 72.7 kg (160 lb 4.4 oz) (IBW pre amputation)     Estimated Fluid Requirement Method: RDA Method  RDA Method (mL): 2137  CHO Requirement: 225g      Nutrition Prescription Ordered    Current Diet Order: 2000 calorie ADA Cardiac    Evaluation of Received Nutrient/Fluid Intake  % Kcal Needs: 50-75%  % Protein Needs: " 50-75%  I/O: 928/1700  Energy Calories Required: meeting needs  Protein Required: meeting needs  Fluid Required: meeting needs  Comments: LBM-7/10/24  Tolerance: tolerating  % Intake of Estimated Energy Needs: 75 - 100 %  % Meal Intake: 75 - 100 %    Nutrition Risk  Level of Risk/Frequency of Follow-up: moderate (follow up: 1-2x per week)     Monitor and Evaluation  Food and Nutrient Intake: food and beverage intake  Food and Nutrient Adminstration: diet order  Physical Activity and Function: nutrition-related ADLs and IADLs  Anthropometric Measurements: weight  Biochemical Data, Medical Tests and Procedures: electrolyte and renal panel  Nutrition-Focused Physical Findings: overall appearance     Nutrition Follow-Up  RD Follow-up?: Yes

## 2024-07-10 NOTE — PLAN OF CARE
Patient is AAOx3. Patient given medications as ordered per MAR. IV antibiotics given as scheduled. LANE PICC in place, CDI. PRN Norco given for pain. Dressing change to buttocks and R foot completed during shift. Sams catheter CDI. Blood glucose monitoring maintained. Cardiac monitoring in place. Safety maintained. Bed alarm set. Instructed to use call light for assistance. Will continue to monitor.        Problem: Diabetes Comorbidity  Goal: Blood Glucose Level Within Targeted Range  Outcome: Progressing     Problem: Wound  Goal: Optimal Wound Healing  Outcome: Progressing     Problem: Infection  Goal: Absence of Infection Signs and Symptoms  Outcome: Progressing

## 2024-07-10 NOTE — ASSESSMENT & PLAN NOTE
Patient's anemia is currently uncontrolled. Has 2 u PRBC ordered. Etiology likely d/t Iron deficiency.   Current CBC reviewed-   Lab Results   Component Value Date    HGB 8.3 (L) 07/10/2024    HCT 26.9 (L) 07/10/2024     Monitor serial CBC and transfuse if patient becomes hemodynamically unstable, symptomatic or H/H drops below 7/21.  -attempted blood transfusion yesterday however patient developed chest transfusion was stopped.  Will order 1 pack of packed red blood cells this morning and premedicate prior.  Orders for p.r.n. Lasix to be given in between units.  PPI BID  --H/H stable today  --consulted GI.  --s/p endoscopy on 6/28:  Impression:            - Normal esophagus.                          - Erosive gastropathy with no stigmata of recent                          bleeding. Biopsied.                          - Normal examined duodenum.   Recommendation:        - [facility].                          - Resume previous diet.                          - Continue present medications.                          - Await pathology results.                          - Observe patient's clinical course.                          - Perform a colonoscopy at appointment to be                          scheduled.

## 2024-07-11 VITALS
RESPIRATION RATE: 18 BRPM | TEMPERATURE: 99 F | OXYGEN SATURATION: 97 % | WEIGHT: 185.63 LBS | DIASTOLIC BLOOD PRESSURE: 60 MMHG | BODY MASS INDEX: 27.49 KG/M2 | HEART RATE: 52 BPM | SYSTOLIC BLOOD PRESSURE: 125 MMHG | HEIGHT: 69 IN

## 2024-07-11 LAB
BASOPHILS # BLD AUTO: 0.08 K/UL (ref 0–0.2)
BASOPHILS NFR BLD: 0.9 % (ref 0–1.9)
DIFFERENTIAL METHOD BLD: ABNORMAL
EOSINOPHIL # BLD AUTO: 1.4 K/UL (ref 0–0.5)
EOSINOPHIL NFR BLD: 16.2 % (ref 0–8)
ERYTHROCYTE [DISTWIDTH] IN BLOOD BY AUTOMATED COUNT: 18.3 % (ref 11.5–14.5)
HCT VFR BLD AUTO: 27.1 % (ref 40–54)
HGB BLD-MCNC: 8.4 G/DL (ref 14–18)
IMM GRANULOCYTES # BLD AUTO: 0.02 K/UL (ref 0–0.04)
IMM GRANULOCYTES NFR BLD AUTO: 0.2 % (ref 0–0.5)
LYMPHOCYTES # BLD AUTO: 1.2 K/UL (ref 1–4.8)
LYMPHOCYTES NFR BLD: 13.5 % (ref 18–48)
MCH RBC QN AUTO: 26.3 PG (ref 27–31)
MCHC RBC AUTO-ENTMCNC: 31 G/DL (ref 32–36)
MCV RBC AUTO: 85 FL (ref 82–98)
MONOCYTES # BLD AUTO: 0.7 K/UL (ref 0.3–1)
MONOCYTES NFR BLD: 7.5 % (ref 4–15)
NEUTROPHILS # BLD AUTO: 5.3 K/UL (ref 1.8–7.7)
NEUTROPHILS NFR BLD: 61.7 % (ref 38–73)
NRBC BLD-RTO: 0 /100 WBC
PLATELET # BLD AUTO: 387 K/UL (ref 150–450)
PMV BLD AUTO: 8.9 FL (ref 9.2–12.9)
POCT GLUCOSE: 177 MG/DL (ref 70–110)
POCT GLUCOSE: 217 MG/DL (ref 70–110)
POCT GLUCOSE: 220 MG/DL (ref 70–110)
RBC # BLD AUTO: 3.2 M/UL (ref 4.6–6.2)
WBC # BLD AUTO: 8.66 K/UL (ref 3.9–12.7)

## 2024-07-11 PROCEDURE — 25000003 PHARM REV CODE 250: Performed by: HOSPITALIST

## 2024-07-11 PROCEDURE — 63600175 PHARM REV CODE 636 W HCPCS: Performed by: HOSPITALIST

## 2024-07-11 PROCEDURE — 25000003 PHARM REV CODE 250: Performed by: NURSE PRACTITIONER

## 2024-07-11 PROCEDURE — 94761 N-INVAS EAR/PLS OXIMETRY MLT: CPT

## 2024-07-11 PROCEDURE — 85025 COMPLETE CBC W/AUTO DIFF WBC: CPT | Performed by: FAMILY MEDICINE

## 2024-07-11 PROCEDURE — 99900035 HC TECH TIME PER 15 MIN (STAT)

## 2024-07-11 PROCEDURE — 63600175 PHARM REV CODE 636 W HCPCS: Mod: JZ,JG | Performed by: NURSE PRACTITIONER

## 2024-07-11 PROCEDURE — A4216 STERILE WATER/SALINE, 10 ML: HCPCS | Performed by: NURSE PRACTITIONER

## 2024-07-11 PROCEDURE — 25000003 PHARM REV CODE 250

## 2024-07-11 PROCEDURE — 63600175 PHARM REV CODE 636 W HCPCS

## 2024-07-11 RX ORDER — ISOSORBIDE DINITRATE 10 MG/1
10 TABLET ORAL 3 TIMES DAILY
Qty: 90 TABLET | Refills: 11 | Status: SHIPPED | OUTPATIENT
Start: 2024-07-11 | End: 2024-07-11 | Stop reason: HOSPADM

## 2024-07-11 RX ADMIN — INSULIN ASPART 2 UNITS: 100 INJECTION, SOLUTION INTRAVENOUS; SUBCUTANEOUS at 12:07

## 2024-07-11 RX ADMIN — FERROUS SULFATE TAB 325 MG (65 MG ELEMENTAL FE) 1 EACH: 325 (65 FE) TAB at 09:07

## 2024-07-11 RX ADMIN — APIXABAN 5 MG: 5 TABLET, FILM COATED ORAL at 09:07

## 2024-07-11 RX ADMIN — HYDROCODONE BITARTRATE AND ACETAMINOPHEN 1 TABLET: 10; 325 TABLET ORAL at 02:07

## 2024-07-11 RX ADMIN — PANTOPRAZOLE SODIUM 40 MG: 40 INJECTION, POWDER, LYOPHILIZED, FOR SOLUTION INTRAVENOUS at 10:07

## 2024-07-11 RX ADMIN — HYDROCORTISONE: 0.01 CREAM TOPICAL at 09:07

## 2024-07-11 RX ADMIN — VANCOMYCIN HYDROCHLORIDE 1250 MG: 1.25 INJECTION, POWDER, LYOPHILIZED, FOR SOLUTION INTRAVENOUS at 04:07

## 2024-07-11 RX ADMIN — DOCUSATE SODIUM AND SENNOSIDES 1 TABLET: 8.6; 5 TABLET, FILM COATED ORAL at 09:07

## 2024-07-11 RX ADMIN — GABAPENTIN 300 MG: 300 CAPSULE ORAL at 09:07

## 2024-07-11 RX ADMIN — HYDROCODONE BITARTRATE AND ACETAMINOPHEN 1 TABLET: 10; 325 TABLET ORAL at 01:07

## 2024-07-11 RX ADMIN — TAMSULOSIN HYDROCHLORIDE 0.4 MG: 0.4 CAPSULE ORAL at 09:07

## 2024-07-11 RX ADMIN — ASPIRIN 81 MG: 81 TABLET, COATED ORAL at 09:07

## 2024-07-11 RX ADMIN — ALTEPLASE 2 MG: 2.2 INJECTION, POWDER, LYOPHILIZED, FOR SOLUTION INTRAVENOUS at 09:07

## 2024-07-11 RX ADMIN — INSULIN ASPART 2 UNITS: 100 INJECTION, SOLUTION INTRAVENOUS; SUBCUTANEOUS at 05:07

## 2024-07-11 RX ADMIN — ISOSORBIDE DINITRATE 10 MG: 10 TABLET ORAL at 02:07

## 2024-07-11 RX ADMIN — NIFEDIPINE 60 MG: 60 TABLET, FILM COATED, EXTENDED RELEASE ORAL at 09:07

## 2024-07-11 RX ADMIN — CEFEPIME 2 G: 2 INJECTION, POWDER, FOR SOLUTION INTRAVENOUS at 01:07

## 2024-07-11 RX ADMIN — Medication 10 ML: at 09:07

## 2024-07-11 RX ADMIN — ISOSORBIDE DINITRATE 10 MG: 10 TABLET ORAL at 09:07

## 2024-07-11 RX ADMIN — Medication 10 ML: at 01:07

## 2024-07-11 RX ADMIN — CEFEPIME 2 G: 2 INJECTION, POWDER, FOR SOLUTION INTRAVENOUS at 06:07

## 2024-07-11 NOTE — PROGRESS NOTES
Pharmacokinetic Assessment Follow Up: IV Vancomycin    Vancomycin serum concentration assessment(s):    The trough level was drawn correctly and can be used to guide therapy at this time. The measurement is within the desired definitive target range of 15 to 20 mcg/mL.    Vancomycin Regimen Plan:    Continue regimen to Vancomycin 1250 mg IV every 24 hours with next serum trough concentration measured at 1630 prior to 3rd dose on 07/12/24    Drug levels (last 3 results):  Recent Labs   Lab Result Units 07/10/24  1632   Vancomycin-Trough ug/mL 17.0       Pharmacy will continue to follow and monitor vancomycin.    Please contact pharmacy at extension [phone] for questions regarding this assessment.    Thank you for the consult,   Carlyn Baumann       Patient brief summary:  Saji Castañeda is a 74 y.o. male initiated on antimicrobial therapy with IV Vancomycin for treatment of bone/joint infection    The patient's current regimen is 1250 mg q 24 h    Drug Allergies:   Review of patient's allergies indicates:  No Known Allergies    Actual Body Weight:   84.2 kg    Renal Function:   Estimated Creatinine Clearance: 79.8 mL/min (based on SCr of 0.8 mg/dL).,     Dialysis Method (if applicable):  N/A    CBC (last 72 hours):  Recent Labs   Lab Result Units 07/08/24  0551 07/09/24  0605 07/10/24  0544   WBC K/uL 8.20 7.73 8.96   Hemoglobin g/dL 7.9* 8.2* 8.3*   Hematocrit % 25.6* 26.1* 26.9*   Platelets K/uL 448 410 409   Gran % % 58.5 60.7 60.6   Lymph % % 21.2 16.6* 15.5*   Mono % % 8.4 8.9 8.4   Eosinophil % % 10.9* 12.7* 14.3*   Basophil % % 0.6 0.6 0.8   Differential Method  Automated Automated Automated       Metabolic Panel (last 72 hours):  Recent Labs   Lab Result Units 07/08/24  0551   Sodium mmol/L 136   Potassium mmol/L 3.9   Chloride mmol/L 100   CO2 mmol/L 31*   Glucose mg/dL 169*   BUN mg/dL 10   Creatinine mg/dL 0.8   Albumin g/dL 1.4*   Total Bilirubin mg/dL 0.2   Alkaline Phosphatase U/L 51*   AST U/L 14   ALT  U/L <5*       Vancomycin Administrations:  vancomycin given in the last 96 hours                     vancomycin 1,250 mg in D5W 250 mL IVPB (Vial-Mate) (mg) 1,250 mg New Bag 07/10/24 1735     1,250 mg New Bag 07/09/24 1641     1,250 mg New Bag 07/08/24 1640     1,250 mg New Bag 07/07/24 1735                    Microbiologic Results:  Microbiology Results (last 7 days)       Procedure Component Value Units Date/Time    Aerobic culture [[id number]]  (Abnormal)  (Susceptibility) Collected: 07/01/24 1241    Order Status: Completed Specimen: Bone from Foot, Right Updated: 07/06/24 1313     Aerobic Bacterial Culture METHICILLIN RESISTANT STAPHYLOCOCCUS AUREUS  Few        PSEUDOMONAS AERUGINOSA   Few      Narrative:      Right calcaneus bone    Culture, Anaerobic [[id number]] Collected: 07/01/24 1241    Order Status: Completed Specimen: Bone from Foot, Right Updated: 07/05/24 1201     Anaerobic Culture No anaerobes isolated    Narrative:      Right calcaneus bone    Blood culture [[id number]] Collected: 06/28/24 2252    Order Status: Completed Specimen: Blood from Peripheral, Hand, Right Updated: 07/04/24 1012     Blood Culture, Routine No growth after 5 days.    Narrative:      Collection has been rescheduled by [email or web address] at 06/28/2024 12:19 Reason:   Unable to collect .  Collection has been rescheduled by KML at 06/28/2024 16:08 Reason:   Unable to collect notified nurse Ludmila  Collection has been rescheduled by [email or web address] at 06/28/2024 12:19 Reason:   Unable to collect .  Collection has been rescheduled by KML at 06/28/2024 16:08 Reason:   Unable to collect notified nurse Ludmila    Blood culture [[id number]] Collected: 06/28/24 2251    Order Status: Completed Specimen: Blood from Peripheral, Hand, Right Updated: 07/04/24 1012     Blood Culture, Routine No growth after 5 days.    Narrative:      Collection has been rescheduled by [email or web address] at 06/28/2024 12:19 Reason:   Unable to collect .  Collection has been rescheduled by KML at  06/28/2024 16:08 Reason:   Unable to collect notified nurse Ludmila  Collection has been rescheduled by [email or web address] at 06/28/2024 12:19 Reason:   Unable to collect .  Collection has been rescheduled by KML at 06/28/2024 16:08 Reason:   Unable to collect notified nurse Ludmila

## 2024-07-11 NOTE — ASSESSMENT & PLAN NOTE
Patient has hypokalemia which is Acute and currently uncontrolled. Most recent potassium levels reviewed-   Lab Results   Component Value Date    K 3.9 07/08/2024   . Will continue potassium replacement per protocol and recheck repeat levels after replacement completed.

## 2024-07-11 NOTE — ASSESSMENT & PLAN NOTE
- Consulted ID, appreciate rec's  - IV abx ordered---Avycaz transitioned back to cefepime per ID, continue vancomycin.   - Blood cultures from 6/26 with no growth to date  - Bone culture grew Pseudomonas and MRSA  -Appreciate IDs plan below:  -will need 6 weeks of therapy  -while outpatient will need weekly cbc, cmp, crp ,and vanco trough faxed to [phone] (or can be monitored at nursing home  - Appreciate Podiatry assistance with I&D

## 2024-07-11 NOTE — DISCHARGE SUMMARY
[facility] Medicine  Discharge Summary      Patient Name: Saji Castañeda  MRN: [id number]  BREEZY: [id number]  Patient Class: IP- Inpatient  Admission Date: 6/26/2024  Hospital Length of Stay: 14 days  Discharge Date and Time:  07/11/2024 10:58 AM  Attending Physician: Carmen Hawkins*   Discharging Provider: Lo Melgar NP  Primary Care Provider: No primary care provider on file.    Primary Care Team: Networked reference to record PCT     HPI:   Saji Castañeda is a 75 y/o M who has a pmh of Arthritis, Bacteremia due to Gram-negative bacteria, Diabetes mellitus, Diabetes mellitus, type 2, Hyperlipidemia, Hypertension, Osteomyelitis, and Palliative care encounter. presenting to the Emergency Department for referral. Patient was sent to the ED via EMS from [facility] for low blood count. Patient denies any chest pain, shortness on breath, weakness, fatigue, dizziness. His hemoglobin was 6.3--will order 1 u PRBC ordered. FOBT pending. Will admit to the [facility] service for further care.    Procedure(s) (LRB):  EGD (ESOPHAGOGASTRODUODENOSCOPY) (N/A)      Hospital Course:   He was admitted to the hospital medicine service for further care.  Patient noted with anemia as well as osteomyelitis on admission.  Patient underwent a EGD on 06/28 which noted Erosive gastropathy.  Anticoagulation is on hold until cleared by GI.  He did receive 1 unit of packed red blood cells on admission.  CT of his right foot noted with osteomyelitis.  Appreciate Podiatry is evaluation---status post bone biopsy on today---also appreciate infectious disease.     Goals of Care Treatment Preferences:  Code Status: Full Code    Health care agent: Kandy Castañeda  [facility] agent number: [phone]       [place]: Yes           Consults:   Consults (From admission, onward)          Status Ordering Provider     Inpatient consult to PICC team ([facility])  Once        Provider:  (Not yet assigned)    Ordered  "HALEIGH TORRES     [facility]  [facility]        Provider:  (Not yet assigned)    Completed LAMAR RESENDIZ     Inpatient consult to Social Work  Once        Provider:  (Not yet assigned)    Completed DEBBI HARRINGTON     Inpatient consult to Infectious Diseases  Once        Provider:  (Not yet assigned)    Completed HALEIGH TORRES     Pharmacy to dose Vancomycin consult  Once        Provider:  (Not yet assigned)   Placed in "And" Linked Group    Acknowledged HALEIGH TORRES     Inpatient consult to Cardiology-[facility]  Once        Provider:  (Not yet assigned)    Completed TERI MELTON     Inpatient consult to Podiatry  Once        Provider:  (Not yet assigned)    Completed HALEIGH TORRES     Inpatient consult to Gastroenterology  Once        Provider:  (Not yet assigned)    Completed HALEIGH TORRES     Inpatient consult to Registered Dietitian/Nutritionist  Once        Provider:  (Not yet assigned)    Completed SHARLENE CALVILLO     Inpatient consult to Registered Dietitian/Nutritionist  Once        Provider:  (Not yet assigned)    Completed SHARLENE CALVILLO            Cardiac/Vascular  Paroxysmal atrial fibrillation  Patient with trial fibrillation which is controlled currently with Calcium Channel Blocker. Patient is currently in atrial fibrillation.[id number] Score: 4. . Anticoagulation done with Eliquis  -resumed Nifedipine XR at 60mg     Peripheral arterial disease  PVD (peripheral vascular disease)    -continue OAC and ASA      Essential hypertension  Chronic, uncontrolled. Latest blood pressure and vitals reviewed-     Temp:  [97.3 °F (36.3 °C)-99.6 °F (37.6 °C)]   Pulse:  [74-81]   Resp:  [16-20]   BP: ()/(53-77)   SpO2:  [96 %-98 %] .   Home meds for hypertension were reviewed and noted below.   Hypertension Medications               furosemide (LASIX) 20 MG tablet Take 1 tablet (20 mg total) by mouth once " daily.    NIFEdipine (PROCARDIA-XL) 90 MG (OSM) 24 hr tablet Take 1 tablet (90 mg total) by mouth once daily.    bumetanide (BUMEX) 0.5 MG Tab Take 0.5 mg by mouth daily as needed.    bumetanide (BUMEX) 1 MG tablet Take by mouth. unknown    hydrALAZINE (APRESOLINE) 100 MG tablet Take by mouth. unknown    isosorbide dinitrate (ISORDIL) 10 MG tablet Take 1 tablet (10 mg total) by mouth 3 (three) times daily. Hold until follow up with a provider            While in the hospital, will manage blood pressure as follows; will resume meds as tolerated     Will utilize p.r.n. blood pressure medication only if patient's blood pressure greater than 160/100 and he develops symptoms such as worsening chest pain or shortness of breath.    Renal/  Stage 3b chronic kidney disease  Creatine stable for now. BMP reviewed- noted Estimated Creatinine Clearance: 79.8 mL/min (based on SCr of 0.8 mg/dL). according to latest data. Based on current GFR, CKD stage is stage 2 - GFR 60-89.  Monitor UOP and serial BMP and adjust therapy as needed. Renally dose meds. Avoid nephrotoxic medications and procedures.    Hypokalemia-resolved as of 7/8/2024  Patient has hypokalemia which is Acute and currently uncontrolled. Most recent potassium levels reviewed-   Lab Results   Component Value Date    K 3.9 07/08/2024   . Will continue potassium replacement per protocol and recheck repeat levels after replacement completed.     ID  Osteomyelitis of right lower extremity  - Consulted ID, appreciate rec's  - IV abx ordered---Avycaz transitioned back to cefepime per ID, continue vancomycin.   - Blood cultures from 6/26 with no growth to date  - Bone culture grew Pseudomonas and MRSA  -Appreciate IDs plan below:  -will need 6 weeks of therapy  -while outpatient will need weekly cbc, cmp, crp ,and vanco trough faxed to [phone] (or can be monitored at nursing home  - Appreciate Podiatry assistance with I&D        Oncology  * Anemia  Patient's anemia is  currently uncontrolled. Has 2 u PRBC ordered. Etiology likely d/t Iron deficiency.   Current CBC reviewed-   Lab Results   Component Value Date    HGB 8.3 (L) 07/10/2024    HCT 26.9 (L) 07/10/2024     Monitor serial CBC and transfuse if patient becomes hemodynamically unstable, symptomatic or H/H drops below 7/21.  -attempted blood transfusion yesterday however patient developed chest transfusion was stopped.  Will order 1 pack of packed red blood cells this morning and premedicate prior.  Orders for p.r.n. Lasix to be given in between units.  PPI BID  --H/H stable today  --consulted GI.  --s/p endoscopy on 6/28:  Impression:            - Normal esophagus.                          - Erosive gastropathy with no stigmata of recent                          bleeding. Biopsied.                          - Normal examined duodenum.   Recommendation:        - Return patient to hospital [facility] for ongoing care.                          - Resume previous diet.                          - Continue present medications.                          - Await pathology results.                          - Observe patient's clinical course.                          - Perform a colonoscopy at appointment to be                          scheduled.     Endocrine  Type 2 diabetes mellitus, with long-term current use of insulin    Patient's FSGs are uncontrolled due to hyperglycemia on current medication regimen.  Last A1c reviewed-   Lab Results   Component Value Date    HGBA1C 6.8 (H) 06/26/2024     Most recent fingerstick glucose reviewed-   Recent Labs   Lab 07/10/24  1133 07/10/24  1625 07/10/24  2042 07/11/24  0544   POCTGLUCOSE 175* 223* 204* 177*       Current correctional scale  Low  Maintain anti-hyperglycemic dose as follows-   Antihyperglycemics (From admission, onward)      Start     Stop Route Frequency Ordered    06/26/24 1552  insulin aspart U-100 pen 0-5 Units         -- SubQ Before meals & nightly PRN 06/26/24 1453          Hold  Oral hypoglycemics while patient is in the hospital.      Orthopedic  Pressure injury of ankle, unstageable-lateral  Pressure injury of buttock, stage 4   Pressure injury of right heel, stage 4     - Appreciate IP wound care  - Pressure injury prevention interventions - waffle overlay and heel boot  - Vashe wet-to-dry to bilateral buttocks, R buttock, and R heel wound BID  - Mepilex border to L posterior thigh/gluteal fold 3x/week  - Podiatry consulted for R heel wound   --also noted with a bilateral buttock full thickness pressure injury  --Right heel and sacrum CT ordered to r/o OM  --appreciate cardiology's evaluation      Wounds, multiple  -patient with wounds to his sacrum and heel  -wound care consulted  -Ulcer care ordered      Other  Chronic anticoagulation  Continue home OAC      Chronic deep vein thrombosis (DVT) of right upper extremity  -home AC resumed  -follow        Final Active Diagnoses:    Diagnosis Date Noted POA    PRINCIPAL PROBLEM:  Anemia [D64.9] 10/15/2018 Yes    Pressure injury of ankle, unstageable-lateral [L89.500] 06/28/2024 Yes    Pressure injury of right heel, stage 4 [L89.614] 06/28/2024 Yes    Pressure injury of buttock, stage 4 [L89.304] 06/28/2024 Yes    Wounds, multiple [T07.XXXA] 06/26/2024 Yes    Chronic anticoagulation [Z79.01] 03/07/2024 Not Applicable    Stage 3b chronic kidney disease [N18.32] 03/05/2024 Yes    Paroxysmal atrial fibrillation [I48.0] 10/02/2023 Yes     Chronic    Osteomyelitis of right lower extremity [M86.9] 03/10/2023 Yes    Chronic deep vein thrombosis (DVT) of right upper extremity [I82.721] 07/06/2022 Yes     Chronic    PVD (peripheral vascular disease) [I73.9] 02/03/2021 Yes    Peripheral arterial disease [I73.9] 12/10/2020 Yes    Type 2 diabetes mellitus, with long-term current use of insulin [E11.9, Z79.4] 09/23/2014 Not Applicable    Essential hypertension [I10] 08/05/2014 Yes     Chronic      Problems Resolved During this Admission:    Diagnosis Date  "Noted Date Resolved POA    Hypokalemia [E87.6] 02/18/2021 07/08/2024 Yes       Discharged Condition: stable    Disposition: Skilled Nursing Facility    Follow Up:    Patient Instructions:   No discharge procedures on file.    Significant Diagnostic Studies: Labs: All labs within the past 24 hours have been reviewed    Pending Diagnostic Studies:       None           Medications:  Reconciled Home Medications:      Medication List        START taking these medications      D5W PgBk 100 mL with ceFEPIme 2 gram SolR 2 g  Inject 2 g into the vein every 8 (eight) hours.     D5W SolP 250 mL with vancomycin 1.25 gram SolR 1,250 mg  Inject 1,250 mg into the vein Daily.            CHANGE how you take these medications      ergocalciferol 50,000 unit Cap  Commonly known as: ERGOCALCIFEROL  Take 1 capsule (50,000 Units total) by mouth every 7 days. for 10 doses  What changed: when to take this     isosorbide dinitrate 10 MG tablet  Commonly known as: ISORDIL  Take 1 tablet (10 mg total) by mouth 3 (three) times daily.  What changed: additional instructions            CONTINUE taking these medications      acetaminophen 325 MG tablet  Commonly known as: TYLENOL  Take 650 mg by mouth every 6 (six) hours as needed for Pain.     acidophilus-pectin, citrus 100 million cell-10 mg Cap  Take 1 capsule by mouth 2 (two) times a day.     apixaban 5 mg Tab  Commonly known as: ELIQUIS  Take 1 tablet (5 mg total) by mouth 2 (two) times daily.     ascorbic acid (vitamin C) 500 MG tablet  Commonly known as: VITAMIN C  Take 500 mg by mouth 2 (two) times daily.     aspirin 81 MG EC tablet  Commonly known as: ECOTRIN  Take 81 mg by mouth. unknown     BD AUTOSHIELD DUO PEN NEEDLE 30 gauge x 3/16" Ndle  Generic drug: pen needle,diabetic dual safty     BD ULTRA-FINE ADITYA PEN NEEDLE 32 gauge x 5/32" Ndle  Generic drug: pen needle, diabetic  USE FOUR TIMES DAILY WITH MEALS AND NIGHTLY     blood sugar diagnostic Strp  Commonly known as: CONTOUR TEST " STRIPS  1 strip by Misc.(Non-Drug; Combo Route) route 4 (four) times daily. Use to check blood glucose 4x daily     ferrous sulfate 325 (65 FE) MG EC tablet  Take 325 mg by mouth 2 (two) times daily.     FLOMAX 0.4 mg Cap  Generic drug: tamsulosin  Take 0.4 mg by mouth once daily.     fluconazole 100 MG tablet  Commonly known as: DIFLUCAN  Take 100 mg by mouth. unknown     flucytosine 500 mg Cap  Commonly known as: ANCOBON  Take by mouth. unknown     furosemide 20 MG tablet  Commonly known as: LASIX  Take 1 tablet (20 mg total) by mouth once daily.     gabapentin 300 MG capsule  Commonly known as: NEURONTIN  Take 300 mg by mouth 2 (two) times daily.     hydrALAZINE 100 MG tablet  Commonly known as: APRESOLINE  Take by mouth. unknown     * insulin aspart U-100 100 unit/mL injection  Commonly known as: NovoLOG  Inject 2-10 Units into the skin 3 (three) times daily before meals. 0-149=0  150-200: 2 units  201-250: 4 units  251-300: 6 units  301-350: 8 units  351-1000=10 units, NOTIFY MD     * insulin aspart U-100 100 unit/mL (3 mL) Inpn pen  Commonly known as: NovoLOG  Inject 5 Units into the skin 3 (three) times daily with meals.     HARITHA 7-7-1.5 gram Pwpk  Generic drug: arginine-glutamine-calcium HMB  Take 1 packet by mouth 2 (two) times a day.     lancets Misc  Commonly known as: MICROLET LANCET  1 each by Misc.(Non-Drug; Combo Route) route 4 (four) times daily. Use to check blood sugars 4x daily     LANTUS SOLOSTAR U-100 INSULIN 100 unit/mL (3 mL) Inpn pen  Generic drug: insulin glargine U-100 (Lantus)  Inject 12 Units into the skin 2 (two) times a day.     menthol-zinc oxide 0.44-20.6 % Oint  Commonly known as: CALMOSEPTINE  Apply topically as needed.     multivitamin per tablet  Commonly known as: THERAGRAN  Take 1 tablet by mouth once daily.     NIFEdipine 90 MG (OSM) 24 hr tablet  Commonly known as: PROCARDIA-XL  Take 1 tablet (90 mg total) by mouth once daily.     oxyCODONE 5 MG immediate release  tablet  Commonly known as: ROXICODONE  Take 1 tablet (5 mg total) by mouth every 6 (six) hours as needed for Pain.     pantoprazole 40 MG tablet  Commonly known as: PROTONIX  Take 40 mg by mouth once daily. Before breakfast     povidone-iodine 10 % external solution  Commonly known as: BETADINE  Apply topically as needed for Wound Care.     SantyL ointment  Generic drug: collagenase  Apply topically once daily. unknown     TRUE METRIX GLUCOSE METER Misc  Generic drug: blood-glucose meter     zinc sulfate 50 mg zinc (220 mg) capsule  Commonly known as: ZINCATE  Take 220 mg by mouth every morning.           * This list has 2 medication(s) that are the same as other medications prescribed for you. Read the directions carefully, and ask your doctor or other care provider to review them with you.                STOP taking these medications      levoFLOXacin 750 MG tablet  Commonly known as: LEVAQUIN              Indwelling Lines/Drains at time of discharge:   Lines/Drains/Airways       Peripherally Inserted Central Catheter Line  Duration             PICC Double Lumen 07/04/24 1515 left brachial 6 days              Drain  Duration                  Urethral Catheter 06/26/24 1815 14 days                    Time spent on the discharge of patient: 45 minutes         Lo Melgar NP  Department of Hospital Medicine  [facility]

## 2024-07-11 NOTE — NURSING
"This RN at bedside for shift change. Attempted to provide weight shifting assistance to pt and turn pt. Pt refused. When asked when we can turn him, pt stated, "In 2 hours."   "

## 2024-07-11 NOTE — NURSING
Pt safety maintained. Pt on continuous cardiac monitoring. Sams maintained to gravity. Medication administered per MAR. Sterile dressing change performed to left upper arm PICC. Wound care performed per order, pt tolerated well. Weight shifting assistance provided to pt, as pt allowed. Positioning wedge in place. Zflex boot to right foot. Waffle overlay in place. Contact precautions maintained. Pt instructed to call w/any needs, verbalized understanding. Pt denies any needs at this time. Pt monitored by video. Pt room near nurse station. Bed in lowest position, locked and bed alarms on. Call light w/in pt's reach.

## 2024-07-11 NOTE — PLAN OF CARE
[facility]    FACILITY TRANSFER ORDERS      Patient Name: Saji Castañeda  YOB: 1950    PCP: No primary care provider on file.   PCP Address: No primary physician on file.  PCP Phone Number: None  PCP Fax: None    Encounter Date: 07/11/2024    Admit to: SNF    Vital Signs:  Routine    Diagnoses:   Active Hospital Problems    Diagnosis  POA    *Anemia [D64.9]  Yes    Pressure injury of ankle, unstageable-lateral [L89.500]  Yes    Pressure injury of right heel, stage 4 [L89.614]  Yes    Pressure injury of buttock, stage 4 [L89.304]  Yes    Wounds, multiple [T07.XXXA]  Yes    Chronic anticoagulation [Z79.01]  Not Applicable    Stage 3b chronic kidney disease [N18.32]  Yes    Paroxysmal atrial fibrillation [I48.0]  Yes     Chronic    Osteomyelitis of right lower extremity [M86.9]  Yes    Chronic deep vein thrombosis (DVT) of right upper extremity [I82.721]  Yes     Chronic    PVD (peripheral vascular disease) [I73.9]  Yes    Peripheral arterial disease [I73.9]  Yes     - long standing wounds with skin graft to left TMA in [id number]; LE angio in 2014 with patent left CFA, SFA, PFA with 3V run off on LE angiogram by Vascular surgery  - CTA LE with run off 6/2022 with moderate to high grade disease of right popliteal with severely diseased AT, PT, peroneal and multiple occlusion of left popliteal with occluded PT and peroneal and short segment occlusion of AT- patient denies any previous intervention  - BLE arterial ultrasound 3/2023 with similar findings suggestive of bilateral popliteal and BTK disease; seen by Vascular surgery at [facility] with recommendation for amputation- patient declined  - transferred to [facility] 05/2023 for evaluation for revascularization; extensive disease and multiple co morbidities along with debility/bed bound status and concern for non compliance, deemed not a  Revascularization candidate and placed on statin and Plavix; no ASA given risk of bleeding with triple  therapy        Type 2 diabetes mellitus, with long-term current use of insulin [E11.9, Z79.4]  Not Applicable    Essential hypertension [I10]  Yes     Chronic      Resolved Hospital Problems    Diagnosis Date Resolved POA    Hypokalemia [E87.6] 07/08/2024 Yes       Allergies:Review of patient's allergies indicates:  No Known Allergies    Diet: cardiac diet and diabetic diet: 2000 calorie    Activities: Activity as tolerated    Goals of Care Treatment Preferences:  Code Status: Full Code    Health care agent: Kandy Castañeda  [facility] agent number: [phone]       [place]: Yes           Nursing: per facility     Labs: -while outpatient will need weekly cbc, cmp, crp ,and vanco trough faxed to [phone] (or can be monitored at nursing home     CONSULTS:    Physical Therapy to evaluate and treat. , Occupational Therapy to evaluate and treat., and  to evaluate for community resources/long-range planning.    MISCELLANEOUS CARE:  Routine Skin for Bedridden Patients: Apply moisture barrier cream to all skin folds and wet areas in perineal area daily and after baths and all bowel movements.    WOUND CARE ORDERS  Nursing to cleanse L posterior thigh/gluteal fold wounds with Vashe wound cleanser and apply Mepilex border to wound. Change 3x/week and prn lifting edges.     Please dress foot wounds as follows:   Remove foot dressings.   Cleanse wound with normal saline or wound . Dry well.    Apply gentian violet to natalie wound maceration if needed.   Apply hydraferal blue ready to all foot wound(s).   Next apply 4x4 gauze directly on top of wound bed   Place cast padding between toes to prevent pressure ulcers .   Top with football dressing consisting 3 rolls of cast padding. Top with kerlix and secure with tape. NO COMPRESSION   Change 3  times per week       Nursing to cleanse bilateral buttocks and R buttock wound with Vashe wound cleanser.  Apply Vashe wet-to-dry dressing using 4x4 gauze to  bilateral buttocks- use a Kerlix for packing R buttock tunneling. Cover wounds with abd pad and secure with tape. Change BID and prn incontinent episode.       Medications: Review discharge medications with patient and family and provide education.      Current Discharge Medication List        START taking these medications    Details   D5W PgBk 100 mL with ceFEPIme 2 gram SolR 2 g    (stop date is 8/12 ) Inject 2 g into the vein every 8 (eight) hours.      D5W SolP 250 mL with vancomycin 1.25 gram SolR 1,250 mg    (stop date is 8/12)  Inject 1,250 mg into the vein Daily.           CONTINUE these medications which have CHANGED    Details        CONTINUE these medications which have NOT CHANGED    Details   acetaminophen (TYLENOL) 325 MG tablet Take 650 mg by mouth every 6 (six) hours as needed for Pain.      acidophilus-pectin, citrus 100 million cell-10 mg Cap Take 1 capsule by mouth 2 (two) times a day.      apixaban (ELIQUIS) 5 mg Tab Take 1 tablet (5 mg total) by mouth 2 (two) times daily.    Associated Diagnoses: Sepsis      arginine-glutamine-calcium HMB (HARITHA) 7-7-1.5 gram PwPk Take 1 packet by mouth 2 (two) times a day.      ascorbic acid, vitamin C, (VITAMIN C) 500 MG tablet Take 500 mg by mouth 2 (two) times daily.      ergocalciferol (ERGOCALCIFEROL) 50,000 unit Cap Take 1 capsule (50,000 Units total) by mouth every 7 days. for 10 doses      ferrous sulfate 325 (65 FE) MG EC tablet Take 325 mg by mouth 2 (two) times daily.      furosemide (LASIX) 20 MG tablet Take 1 tablet (20 mg total) by mouth once daily.  Qty: 30 tablet, Refills: 11      gabapentin (NEURONTIN) 300 MG capsule Take 300 mg by mouth 2 (two) times daily.      insulin aspart U-100 (NOVOLOG) 100 unit/mL (3 mL) InPn pen Inject 5 Units into the skin 3 (three) times daily with meals.  Qty: 3 each, Refills: 11      insulin aspart U-100 (NOVOLOG) 100 unit/mL injection Inject 2-10 Units into the skin 3 (three) times daily before meals.  "0-149=0  150-200: 2 units  201-250: 4 units  251-300: 6 units  301-350: 8 units  351-1000=10 units, NOTIFY MD NILDA KING U-100 INSULIN 100 unit/mL (3 mL) InPn pen Inject 12 Units into the skin 2 (two) times a day.      menthol-zinc oxide (CALMOSEPTINE) 0.44-20.6 % Oint Apply topically as needed.      multivitamin (THERAGRAN) per tablet Take 1 tablet by mouth once daily.      NIFEdipine (PROCARDIA-XL) 90 MG (OSM) 24 hr tablet Take 1 tablet (90 mg total) by mouth once daily.  Qty: 30 tablet, Refills: 11    Comments: .  Associated Diagnoses: Sepsis      oxyCODONE (ROXICODONE) 5 MG immediate release tablet Take 1 tablet (5 mg total) by mouth every 6 (six) hours as needed for Pain.  Qty: 30 tablet, Refills: 0    Comments: Quantity prescribed more than 7 day supply? Yes, quantity medically necessary      pantoprazole (PROTONIX) 40 MG tablet Take 40 mg by mouth once daily. Before breakfast      povidone-iodine (BETADINE) 10 % external solution Apply topically as needed for Wound Care.      SANTYL ointment Apply topically once daily. unknown      tamsulosin (FLOMAX) 0.4 mg Cap Take 0.4 mg by mouth once daily.      zinc sulfate (ZINCATE) 50 mg zinc (220 mg) capsule Take 220 mg by mouth every morning.         BD AUTOSHIELD DUO PEN NEEDLE 30 gauge x 3/16" Ndle       BD ULTRA-FINE ADITYA PEN NEEDLE 32 gauge x 5/32" Ndle USE FOUR TIMES DAILY WITH MEALS AND NIGHTLY  Qty: 100 each, Refills: 0    Associated Diagnoses: Type II diabetes mellitus, uncontrolled      blood sugar diagnostic (CONTOUR TEST STRIPS) Strp 1 strip by Misc.(Non-Drug; Combo Route) route 4 (four) times daily. Use to check blood glucose 4x daily  Qty: 100 strip, Refills: 6    Comments: To use with Contour Ascencia meter  Associated Diagnoses: Type 2 diabetes mellitus with hyperglycemia, with long-term current use of insulin         lancets (MICROLET LANCET) Misc 1 each by Misc.(Non-Drug; Combo Route) route 4 (four) times daily. Use to check blood sugars 4x " daily  Refills: 0    Associated Diagnoses: DKA (diabetic ketoacidosis)      TRUE METRIX GLUCOSE METER Misc            STOP taking these medications       levoFLOXacin (LEVAQUIN) 750 MG tablet Comments:   Reason for Stopping:                  Immunizations Administered as of 7/11/2024       Name Date Dose VIS Date Route Exp Date    COVID-19, MRNA, LN-S, PF (Moderna) 7/13/2021 0.5 mL -- -- --    Lot: [id number]     COVID-19, MRNA, LN-S, PF (Moderna) 7/13/2021 0.5 mL -- Intramuscular --    Site: Left arm     : Modern[employer]     Lot: [id number]     Comment: Adminis     COVID-19, MRNA, LN-S, PF (Moderna) 5/24/2021 0.5 mL -- -- --    Lot: [id number]     COVID-19, MRNA, LN-S, PF (Moderna) 5/24/2021 0.5 mL -- -- --    : Modern[employer]     Lot: [id number]     Comment: Adminis             This patient has had both covid vaccinations    Some patients may experience side effects after vaccination.  These may include fever, headache, muscle or joint aches.  Most symptoms resolve with 24-48 hours and do not require urgent medical evaluation unless they persist for more than 72 hours or symptoms are concerning for an unrelated medical condition.          _________________________________  Lo Melgar NP  07/11/2024

## 2024-07-11 NOTE — ASSESSMENT & PLAN NOTE
Patient's FSGs are uncontrolled due to hyperglycemia on current medication regimen.  Last A1c reviewed-   Lab Results   Component Value Date    HGBA1C 6.8 (H) 06/26/2024     Most recent fingerstick glucose reviewed-   Recent Labs   Lab 07/10/24  1133 07/10/24  1625 07/10/24  2042 07/11/24  0544   POCTGLUCOSE 175* 223* 204* 177*       Current correctional scale  Low  Maintain anti-hyperglycemic dose as follows-   Antihyperglycemics (From admission, onward)    Start     Stop Route Frequency Ordered    06/26/24 1552  insulin aspart U-100 pen 0-5 Units         -- SubQ Before meals & nightly PRN 06/26/24 1453        Hold Oral hypoglycemics while patient is in the hospital.

## 2024-07-11 NOTE — PLAN OF CARE
Discharge orders noted. AVS prepared with clinical reference list and 24/7 [facility] Nurse On Call number attached. Will finalize once discharge order reconciliation complete.

## 2024-07-11 NOTE — PLAN OF CARE
Problem: Adult Inpatient Plan of Care  Goal: Plan of Care Review  Outcome: Progressing  Goal: Patient-Specific Goal (Individualized)  Outcome: Progressing  Goal: Absence of Hospital-Acquired Illness or Injury  Outcome: Progressing  Goal: Optimal Comfort and Wellbeing  Outcome: Progressing  Goal: Readiness for Transition of Care  Outcome: Progressing     Problem: Gastrointestinal Bleeding  Goal: Optimal Coping with Acute Illness  Outcome: Progressing  Goal: Hemostasis  Outcome: Progressing     Problem: Diabetes Comorbidity  Goal: Blood Glucose Level Within Targeted Range  Outcome: Progressing     Problem: Sepsis/Septic Shock  Goal: Absence of Bleeding  Outcome: Progressing  Goal: Blood Glucose Level Within Targeted Range  Outcome: Progressing

## 2024-07-11 NOTE — ASSESSMENT & PLAN NOTE
Chronic, uncontrolled. Latest blood pressure and vitals reviewed-     Temp:  [97.3 °F (36.3 °C)-99.6 °F (37.6 °C)]   Pulse:  [74-81]   Resp:  [16-20]   BP: ()/(53-77)   SpO2:  [96 %-98 %] .   Home meds for hypertension were reviewed and noted below.   Hypertension Medications               furosemide (LASIX) 20 MG tablet Take 1 tablet (20 mg total) by mouth once daily.    NIFEdipine (PROCARDIA-XL) 90 MG (OSM) 24 hr tablet Take 1 tablet (90 mg total) by mouth once daily.    bumetanide (BUMEX) 0.5 MG Tab Take 0.5 mg by mouth daily as needed.    bumetanide (BUMEX) 1 MG tablet Take by mouth. unknown    hydrALAZINE (APRESOLINE) 100 MG tablet Take by mouth. unknown    isosorbide dinitrate (ISORDIL) 10 MG tablet Take 1 tablet (10 mg total) by mouth 3 (three) times daily. Hold until follow up with a provider            While in the hospital, will manage blood pressure as follows; will resume meds as tolerated     Will utilize p.r.n. blood pressure medication only if patient's blood pressure greater than 160/100 and he develops symptoms such as worsening chest pain or shortness of breath.

## 2024-07-11 NOTE — ASSESSMENT & PLAN NOTE
Patient with trial fibrillation which is controlled currently with Calcium Channel Blocker. Patient is currently in atrial fibrillation.[id number] Score: 4. . Anticoagulation done with Eliquis  -resumed Nifedipine XR at 60mg

## 2024-07-11 NOTE — PLAN OF CARE
Discharge order reconciliation complete. AVS finalized. Bedside nurse to print and place in discharge packet for accepting facility once cleared by CM.

## 2024-07-11 NOTE — PLAN OF CARE
Pt is agreeable with discharge back to [facility] on the SNF unit. He is currently intermediate. Report information given to the floor nurse and report was called. He will be transported to [place] with the [facility] ambulance service scheduled for 3:00 PM. Notified pt sister Kandy Castañeda [phone] that pt will be dc back to [place] today.    07/11/24 1423   Final Note   Assessment Type Final Discharge Note   Anticipated Discharge Disposition SNF   What phone number can be called within the next 1-3 days to see how you are doing after discharge? [phone]   Hospital Resources/Appts/Education Provided Appointments scheduled and added to AVS   Post-Acute Status   Post-Acute Authorization Placement   Post-Acute Placement Status Set-up Complete/Auth obtained   Discharge Delays None known at this time

## 2024-07-11 NOTE — NURSING
Pt's PICC line does not draw back blood despite many, many attempts. PICC line will only flush w/3cc flush meeting a lot of resistance. Provider team notified.

## 2024-07-16 ENCOUNTER — OUTSIDE PLACE OF SERVICE (OUTPATIENT)
Dept: ADMINISTRATIVE | Facility: OTHER | Age: 74
End: 2024-07-16
Payer: MEDICARE

## 2024-07-17 NOTE — PHYSICIAN QUERY
Please provide further clarification on the procedure performed on the right heel:

## 2024-07-18 NOTE — PHYSICIAN QUERY
Please provide further clarification on the procedure performed on the right heel:            Excisional Debridement of bone

## 2024-07-22 LAB
FUNGUS SPEC CULT: NORMAL
FUNGUS SPEC CULT: NORMAL

## 2024-07-25 ENCOUNTER — HOSPITAL ENCOUNTER (INPATIENT)
Facility: HOSPITAL | Age: 74
LOS: 3 days | Discharge: ANOTHER HOSPITAL | DRG: 091 | End: 2024-07-29
Attending: EMERGENCY MEDICINE | Admitting: INTERNAL MEDICINE
Payer: MEDICARE

## 2024-07-25 DIAGNOSIS — R41.82 ALTERED MENTAL STATUS: ICD-10-CM

## 2024-07-25 DIAGNOSIS — I44.7 LBBB (LEFT BUNDLE BRANCH BLOCK): ICD-10-CM

## 2024-07-25 DIAGNOSIS — E87.0 HYPERNATREMIA: ICD-10-CM

## 2024-07-25 DIAGNOSIS — N39.0 URINARY TRACT INFECTION ASSOCIATED WITH INDWELLING URETHRAL CATHETER, INITIAL ENCOUNTER: ICD-10-CM

## 2024-07-25 DIAGNOSIS — R94.31 QT PROLONGATION: ICD-10-CM

## 2024-07-25 DIAGNOSIS — T83.511A URINARY TRACT INFECTION ASSOCIATED WITH INDWELLING URETHRAL CATHETER, INITIAL ENCOUNTER: ICD-10-CM

## 2024-07-25 DIAGNOSIS — R07.9 CHEST PAIN: ICD-10-CM

## 2024-07-25 DIAGNOSIS — E87.6 HYPOKALEMIA: Primary | ICD-10-CM

## 2024-07-25 DIAGNOSIS — R41.82 AMS (ALTERED MENTAL STATUS): ICD-10-CM

## 2024-07-25 PROBLEM — A41.9 SEPSIS SECONDARY TO UTI: Status: RESOLVED | Noted: 2024-05-19 | Resolved: 2024-07-25

## 2024-07-25 PROBLEM — A41.9 SEPSIS: Status: RESOLVED | Noted: 2023-10-02 | Resolved: 2024-07-25

## 2024-07-25 LAB
ALBUMIN SERPL BCP-MCNC: 1.7 G/DL (ref 3.5–5.2)
ALP SERPL-CCNC: 74 U/L (ref 55–135)
ALT SERPL W/O P-5'-P-CCNC: 12 U/L (ref 10–44)
ANION GAP SERPL CALC-SCNC: 10 MMOL/L (ref 8–16)
ANION GAP SERPL CALC-SCNC: 8 MMOL/L (ref 8–16)
AST SERPL-CCNC: 18 U/L (ref 10–40)
BACTERIA #/AREA URNS HPF: ABNORMAL /HPF
BACTERIA #/AREA URNS HPF: ABNORMAL /HPF
BASOPHILS # BLD AUTO: 0.06 K/UL (ref 0–0.2)
BASOPHILS NFR BLD: 0.6 % (ref 0–1.9)
BILIRUB SERPL-MCNC: 0.3 MG/DL (ref 0.1–1)
BILIRUB UR QL STRIP: NEGATIVE
BILIRUB UR QL STRIP: NEGATIVE
BNP SERPL-MCNC: 173 PG/ML (ref 0–99)
BUN SERPL-MCNC: 18 MG/DL (ref 8–23)
BUN SERPL-MCNC: 18 MG/DL (ref 8–23)
CALCIUM SERPL-MCNC: 9.2 MG/DL (ref 8.7–10.5)
CALCIUM SERPL-MCNC: 9.4 MG/DL (ref 8.7–10.5)
CHLORIDE SERPL-SCNC: 112 MMOL/L (ref 95–110)
CHLORIDE SERPL-SCNC: 115 MMOL/L (ref 95–110)
CHOLEST SERPL-MCNC: 108 MG/DL (ref 120–199)
CHOLEST/HDLC SERPL: 2.5 {RATIO} (ref 2–5)
CK SERPL-CCNC: 13 U/L (ref 20–200)
CLARITY UR: ABNORMAL
CLARITY UR: ABNORMAL
CO2 SERPL-SCNC: 24 MMOL/L (ref 23–29)
CO2 SERPL-SCNC: 25 MMOL/L (ref 23–29)
COLOR UR: YELLOW
COLOR UR: YELLOW
CREAT SERPL-MCNC: 1.1 MG/DL (ref 0.5–1.4)
CREAT SERPL-MCNC: 1.2 MG/DL (ref 0.5–1.4)
CRP SERPL-MCNC: 70.7 MG/L (ref 0–8.2)
DIFFERENTIAL METHOD BLD: ABNORMAL
EOSINOPHIL # BLD AUTO: 1.4 K/UL (ref 0–0.5)
EOSINOPHIL NFR BLD: 13.6 % (ref 0–8)
ERYTHROCYTE [DISTWIDTH] IN BLOOD BY AUTOMATED COUNT: 18.5 % (ref 11.5–14.5)
EST. GFR  (NO RACE VARIABLE): >60 ML/MIN/1.73 M^2
EST. GFR  (NO RACE VARIABLE): >60 ML/MIN/1.73 M^2
ESTIMATED AVG GLUCOSE: 126 MG/DL (ref 68–131)
GLUCOSE SERPL-MCNC: 107 MG/DL (ref 70–110)
GLUCOSE SERPL-MCNC: 125 MG/DL (ref 70–110)
GLUCOSE UR QL STRIP: NEGATIVE
GLUCOSE UR QL STRIP: NEGATIVE
HBA1C MFR BLD: 6 % (ref 4–5.6)
HCT VFR BLD AUTO: 28.4 % (ref 40–54)
HDLC SERPL-MCNC: 43 MG/DL (ref 40–75)
HDLC SERPL: 39.8 % (ref 20–50)
HGB BLD-MCNC: 8.8 G/DL (ref 14–18)
HGB UR QL STRIP: ABNORMAL
HGB UR QL STRIP: ABNORMAL
HYALINE CASTS #/AREA URNS LPF: 0 /LPF
HYALINE CASTS #/AREA URNS LPF: 0 /LPF
IMM GRANULOCYTES # BLD AUTO: 0.03 K/UL (ref 0–0.04)
IMM GRANULOCYTES NFR BLD AUTO: 0.3 % (ref 0–0.5)
KETONES UR QL STRIP: ABNORMAL
KETONES UR QL STRIP: NEGATIVE
LACTATE SERPL-SCNC: 1.2 MMOL/L (ref 0.5–2.2)
LDLC SERPL CALC-MCNC: 54.6 MG/DL (ref 63–159)
LEUKOCYTE ESTERASE UR QL STRIP: ABNORMAL
LEUKOCYTE ESTERASE UR QL STRIP: ABNORMAL
LYMPHOCYTES # BLD AUTO: 1.3 K/UL (ref 1–4.8)
LYMPHOCYTES NFR BLD: 13.1 % (ref 18–48)
MAGNESIUM SERPL-MCNC: 1.8 MG/DL (ref 1.6–2.6)
MCH RBC QN AUTO: 26.2 PG (ref 27–31)
MCHC RBC AUTO-ENTMCNC: 31 G/DL (ref 32–36)
MCV RBC AUTO: 85 FL (ref 82–98)
MICROSCOPIC COMMENT: ABNORMAL
MICROSCOPIC COMMENT: ABNORMAL
MONOCYTES # BLD AUTO: 0.7 K/UL (ref 0.3–1)
MONOCYTES NFR BLD: 6.8 % (ref 4–15)
NEUTROPHILS # BLD AUTO: 6.5 K/UL (ref 1.8–7.7)
NEUTROPHILS NFR BLD: 65.6 % (ref 38–73)
NITRITE UR QL STRIP: NEGATIVE
NITRITE UR QL STRIP: NEGATIVE
NONHDLC SERPL-MCNC: 65 MG/DL
NRBC BLD-RTO: 0 /100 WBC
PH UR STRIP: 6 [PH] (ref 5–8)
PH UR STRIP: 6 [PH] (ref 5–8)
PHOSPHATE SERPL-MCNC: 2.1 MG/DL (ref 2.7–4.5)
PLATELET # BLD AUTO: 373 K/UL (ref 150–450)
PMV BLD AUTO: 9.6 FL (ref 9.2–12.9)
POTASSIUM SERPL-SCNC: 2.4 MMOL/L (ref 3.5–5.1)
POTASSIUM SERPL-SCNC: 2.5 MMOL/L (ref 3.5–5.1)
POTASSIUM SERPL-SCNC: 3 MMOL/L (ref 3.5–5.1)
PROT SERPL-MCNC: 7.1 G/DL (ref 6–8.4)
PROT UR QL STRIP: ABNORMAL
PROT UR QL STRIP: ABNORMAL
RBC # BLD AUTO: 3.36 M/UL (ref 4.6–6.2)
RBC #/AREA URNS HPF: 15 /HPF (ref 0–4)
RBC #/AREA URNS HPF: 4 /HPF (ref 0–4)
SODIUM SERPL-SCNC: 146 MMOL/L (ref 136–145)
SODIUM SERPL-SCNC: 148 MMOL/L (ref 136–145)
SP GR UR STRIP: 1.01 (ref 1–1.03)
SP GR UR STRIP: 1.02 (ref 1–1.03)
SQUAMOUS #/AREA URNS HPF: 0 /HPF
SQUAMOUS #/AREA URNS HPF: 3 /HPF
TRIGL SERPL-MCNC: 52 MG/DL (ref 30–150)
TROPONIN I SERPL DL<=0.01 NG/ML-MCNC: 0.02 NG/ML (ref 0–0.03)
TROPONIN I SERPL DL<=0.01 NG/ML-MCNC: 0.02 NG/ML (ref 0–0.03)
TSH SERPL DL<=0.005 MIU/L-ACNC: 1.56 UIU/ML (ref 0.4–4)
UNIDENT CRYS URNS QL MICRO: 5
UNIDENT CRYS URNS QL MICRO: 9
URN SPEC COLLECT METH UR: ABNORMAL
URN SPEC COLLECT METH UR: ABNORMAL
UROBILINOGEN UR STRIP-ACNC: NEGATIVE EU/DL
UROBILINOGEN UR STRIP-ACNC: NEGATIVE EU/DL
WBC # BLD AUTO: 9.92 K/UL (ref 3.9–12.7)
WBC #/AREA URNS HPF: 44 /HPF (ref 0–5)
WBC #/AREA URNS HPF: >100 /HPF (ref 0–5)
WBC CLUMPS URNS QL MICRO: ABNORMAL

## 2024-07-25 PROCEDURE — 84443 ASSAY THYROID STIM HORMONE: CPT

## 2024-07-25 PROCEDURE — 96367 TX/PROPH/DG ADDL SEQ IV INF: CPT

## 2024-07-25 PROCEDURE — 81000 URINALYSIS NONAUTO W/SCOPE: CPT | Mod: 91 | Performed by: STUDENT IN AN ORGANIZED HEALTH CARE EDUCATION/TRAINING PROGRAM

## 2024-07-25 PROCEDURE — 63600175 PHARM REV CODE 636 W HCPCS: Performed by: STUDENT IN AN ORGANIZED HEALTH CARE EDUCATION/TRAINING PROGRAM

## 2024-07-25 PROCEDURE — 96372 THER/PROPH/DIAG INJ SC/IM: CPT

## 2024-07-25 PROCEDURE — 63600175 PHARM REV CODE 636 W HCPCS: Performed by: EMERGENCY MEDICINE

## 2024-07-25 PROCEDURE — 80048 BASIC METABOLIC PNL TOTAL CA: CPT | Mod: XB

## 2024-07-25 PROCEDURE — 25000003 PHARM REV CODE 250: Performed by: EMERGENCY MEDICINE

## 2024-07-25 PROCEDURE — 87040 BLOOD CULTURE FOR BACTERIA: CPT

## 2024-07-25 PROCEDURE — 83880 ASSAY OF NATRIURETIC PEPTIDE: CPT | Performed by: EMERGENCY MEDICINE

## 2024-07-25 PROCEDURE — 63600175 PHARM REV CODE 636 W HCPCS

## 2024-07-25 PROCEDURE — 25000003 PHARM REV CODE 250

## 2024-07-25 PROCEDURE — 93005 ELECTROCARDIOGRAM TRACING: CPT

## 2024-07-25 PROCEDURE — 83735 ASSAY OF MAGNESIUM: CPT | Performed by: EMERGENCY MEDICINE

## 2024-07-25 PROCEDURE — G0378 HOSPITAL OBSERVATION PER HR: HCPCS

## 2024-07-25 PROCEDURE — 84100 ASSAY OF PHOSPHORUS: CPT

## 2024-07-25 PROCEDURE — 84484 ASSAY OF TROPONIN QUANT: CPT | Mod: 91

## 2024-07-25 PROCEDURE — 80061 LIPID PANEL: CPT

## 2024-07-25 PROCEDURE — 96365 THER/PROPH/DIAG IV INF INIT: CPT

## 2024-07-25 PROCEDURE — 85025 COMPLETE CBC W/AUTO DIFF WBC: CPT | Performed by: EMERGENCY MEDICINE

## 2024-07-25 PROCEDURE — 87086 URINE CULTURE/COLONY COUNT: CPT | Performed by: EMERGENCY MEDICINE

## 2024-07-25 PROCEDURE — 84484 ASSAY OF TROPONIN QUANT: CPT | Performed by: EMERGENCY MEDICINE

## 2024-07-25 PROCEDURE — 82550 ASSAY OF CK (CPK): CPT | Performed by: EMERGENCY MEDICINE

## 2024-07-25 PROCEDURE — 86140 C-REACTIVE PROTEIN: CPT

## 2024-07-25 PROCEDURE — 96366 THER/PROPH/DIAG IV INF ADDON: CPT

## 2024-07-25 PROCEDURE — 81000 URINALYSIS NONAUTO W/SCOPE: CPT | Performed by: EMERGENCY MEDICINE

## 2024-07-25 PROCEDURE — 80053 COMPREHEN METABOLIC PANEL: CPT | Performed by: EMERGENCY MEDICINE

## 2024-07-25 PROCEDURE — 84132 ASSAY OF SERUM POTASSIUM: CPT | Performed by: EMERGENCY MEDICINE

## 2024-07-25 PROCEDURE — 83605 ASSAY OF LACTIC ACID: CPT | Performed by: EMERGENCY MEDICINE

## 2024-07-25 PROCEDURE — 83036 HEMOGLOBIN GLYCOSYLATED A1C: CPT

## 2024-07-25 PROCEDURE — 99285 EMERGENCY DEPT VISIT HI MDM: CPT | Mod: 25

## 2024-07-25 RX ORDER — POTASSIUM CHLORIDE 20 MEQ/1
20 TABLET, EXTENDED RELEASE ORAL
Status: COMPLETED | OUTPATIENT
Start: 2024-07-25 | End: 2024-07-25

## 2024-07-25 RX ORDER — POTASSIUM CHLORIDE 29.8 MG/ML
40 INJECTION INTRAVENOUS
Status: COMPLETED | OUTPATIENT
Start: 2024-07-25 | End: 2024-07-25

## 2024-07-25 RX ORDER — IBUPROFEN 200 MG
16 TABLET ORAL
Status: DISCONTINUED | OUTPATIENT
Start: 2024-07-25 | End: 2024-07-29 | Stop reason: HOSPADM

## 2024-07-25 RX ORDER — PANTOPRAZOLE SODIUM 40 MG/1
40 TABLET, DELAYED RELEASE ORAL DAILY
Status: DISCONTINUED | OUTPATIENT
Start: 2024-07-26 | End: 2024-07-29 | Stop reason: HOSPADM

## 2024-07-25 RX ORDER — GABAPENTIN 300 MG/1
300 CAPSULE ORAL 2 TIMES DAILY
Status: DISCONTINUED | OUTPATIENT
Start: 2024-07-25 | End: 2024-07-26

## 2024-07-25 RX ORDER — ACETAMINOPHEN 325 MG/1
650 TABLET ORAL EVERY 6 HOURS PRN
Status: DISCONTINUED | OUTPATIENT
Start: 2024-07-25 | End: 2024-07-29 | Stop reason: HOSPADM

## 2024-07-25 RX ORDER — VANCOMYCIN 1.75 G/350ML
1.25 INJECTION, SOLUTION INTRAVENOUS DAILY
COMMUNITY
Start: 2024-07-24 | End: 2024-07-25 | Stop reason: CLARIF

## 2024-07-25 RX ORDER — SODIUM,POTASSIUM PHOSPHATES 280-250MG
2 POWDER IN PACKET (EA) ORAL ONCE
Status: COMPLETED | OUTPATIENT
Start: 2024-07-26 | End: 2024-07-26

## 2024-07-25 RX ORDER — SODIUM CHLORIDE 0.9 % (FLUSH) 0.9 %
10 SYRINGE (ML) INJECTION EVERY 12 HOURS PRN
Status: DISCONTINUED | OUTPATIENT
Start: 2024-07-25 | End: 2024-07-29 | Stop reason: HOSPADM

## 2024-07-25 RX ORDER — MAGNESIUM SULFATE HEPTAHYDRATE 40 MG/ML
2 INJECTION, SOLUTION INTRAVENOUS ONCE
Status: COMPLETED | OUTPATIENT
Start: 2024-07-25 | End: 2024-07-26

## 2024-07-25 RX ORDER — NIFEDIPINE 30 MG/1
90 TABLET, EXTENDED RELEASE ORAL DAILY
Status: DISCONTINUED | OUTPATIENT
Start: 2024-07-26 | End: 2024-07-27

## 2024-07-25 RX ORDER — CEFEPIME HYDROCHLORIDE 2 G/1
2 INJECTION, POWDER, FOR SOLUTION INTRAVENOUS DAILY
COMMUNITY
Start: 2024-07-24 | End: 2024-07-25 | Stop reason: CLARIF

## 2024-07-25 RX ORDER — POTASSIUM CHLORIDE 20 MEQ/1
40 TABLET, EXTENDED RELEASE ORAL
Status: COMPLETED | OUTPATIENT
Start: 2024-07-25 | End: 2024-07-25

## 2024-07-25 RX ORDER — IBUPROFEN 200 MG
24 TABLET ORAL
Status: DISCONTINUED | OUTPATIENT
Start: 2024-07-25 | End: 2024-07-29 | Stop reason: HOSPADM

## 2024-07-25 RX ORDER — TAMSULOSIN HYDROCHLORIDE 0.4 MG/1
0.4 CAPSULE ORAL DAILY
Status: DISCONTINUED | OUTPATIENT
Start: 2024-07-26 | End: 2024-07-29 | Stop reason: HOSPADM

## 2024-07-25 RX ORDER — GLUCAGON 1 MG
1 KIT INJECTION
Status: DISCONTINUED | OUTPATIENT
Start: 2024-07-25 | End: 2024-07-29 | Stop reason: HOSPADM

## 2024-07-25 RX ORDER — INSULIN GLARGINE 100 [IU]/ML
6 INJECTION, SOLUTION SUBCUTANEOUS 2 TIMES DAILY
Status: DISCONTINUED | OUTPATIENT
Start: 2024-07-25 | End: 2024-07-29 | Stop reason: HOSPADM

## 2024-07-25 RX ORDER — POTASSIUM CHLORIDE 20 MEQ/1
20 TABLET, EXTENDED RELEASE ORAL 2 TIMES DAILY
Status: ON HOLD | COMMUNITY
Start: 2024-07-22

## 2024-07-25 RX ORDER — OXYCODONE HYDROCHLORIDE 5 MG/1
5 TABLET ORAL EVERY 6 HOURS PRN
Status: DISCONTINUED | OUTPATIENT
Start: 2024-07-25 | End: 2024-07-29 | Stop reason: HOSPADM

## 2024-07-25 RX ORDER — INSULIN ASPART 100 [IU]/ML
0-5 INJECTION, SOLUTION INTRAVENOUS; SUBCUTANEOUS
Status: DISCONTINUED | OUTPATIENT
Start: 2024-07-25 | End: 2024-07-29 | Stop reason: HOSPADM

## 2024-07-25 RX ORDER — POTASSIUM CHLORIDE 20 MEQ/1
40 TABLET, EXTENDED RELEASE ORAL ONCE
Status: COMPLETED | OUTPATIENT
Start: 2024-07-25 | End: 2024-07-25

## 2024-07-25 RX ORDER — NALOXONE HCL 0.4 MG/ML
0.02 VIAL (ML) INJECTION
Status: DISCONTINUED | OUTPATIENT
Start: 2024-07-25 | End: 2024-07-29 | Stop reason: HOSPADM

## 2024-07-25 RX ADMIN — INSULIN GLARGINE 6 UNITS: 100 INJECTION, SOLUTION SUBCUTANEOUS at 10:07

## 2024-07-25 RX ADMIN — POTASSIUM CHLORIDE 40 MEQ: 1500 TABLET, EXTENDED RELEASE ORAL at 10:07

## 2024-07-25 RX ADMIN — POTASSIUM CHLORIDE 20 MEQ: 1500 TABLET, EXTENDED RELEASE ORAL at 02:07

## 2024-07-25 RX ADMIN — POTASSIUM CHLORIDE 40 MEQ: 29.8 INJECTION, SOLUTION INTRAVENOUS at 05:07

## 2024-07-25 RX ADMIN — APIXABAN 5 MG: 5 TABLET, FILM COATED ORAL at 10:07

## 2024-07-25 RX ADMIN — CEFTRIAXONE SODIUM 1 G: 1 INJECTION, POWDER, FOR SOLUTION INTRAMUSCULAR; INTRAVENOUS at 02:07

## 2024-07-25 RX ADMIN — GABAPENTIN 300 MG: 300 CAPSULE ORAL at 10:07

## 2024-07-25 RX ADMIN — POTASSIUM CHLORIDE 40 MEQ: 1500 TABLET, EXTENDED RELEASE ORAL at 01:07

## 2024-07-25 RX ADMIN — CEFEPIME 2 G: 2 INJECTION, POWDER, FOR SOLUTION INTRAVENOUS at 10:07

## 2024-07-25 NOTE — ED NOTES
Pt to ED from [facility], for abnormal labs (k 2.7 and elev vanc trough) and AMS. Per EMS, family states pt is at his baseline. Pt able to state name, unable to state birthday, can state month, and at hospital, delayed responses. Pt hypertensive all other VSS, Srx2, bed in lowest position. L AKA noted, RLE bandaged and in airboot.

## 2024-07-25 NOTE — PLAN OF CARE
SW rounded on pt. Pt unable to speak with SW clearly at this time. Pt a resident at     [facility]  Address: [address], [place] [address]  Phone: [phone]    CM will continue to follow and update as needed.      07/25/24 1148   Post-Acute Status   Post-Acute Authorization Placement   Discharge Plan   Discharge Plan A Return to nursing home     Chelsey Becerra, FRANCISCO  [phone]  Emergency Room

## 2024-07-25 NOTE — PROVIDER PROGRESS NOTES - EMERGENCY DEPT.
Encounter Date: 7/25/2024    ED Physician Progress Notes            Patient signed out to me pending repeat potassium.  He is 74-year-old male, multiple medical comorbidities, chronic sacral ulcers, chronic leg ulcers, overall medically and mentally frail, who presented for decreased mental status, and hypokalemia.  It was done outpatient 2 days ago and was found to be low, on my assessment of the pacer and upon assumption of care he has slightly more confused than baseline (stammering speech), however on repeat assessments he is improved.  While he has a number of chronic infections he does not have any other findings of sepsis, such as white count, lactic acid, or fever.  He is not lateralized, it is not consistent with a stroke.  CT was done demonstrates no acute process as well.  His potassium map despite oral repletion in fact went down, and 2.4 is not stable for discharge, I suspect he is likely somewhat delirious in the setting of this electrolyte derangement..  He will be admitted for repletion.    All elements of care discussed with the admitting team will be continuing care.

## 2024-07-25 NOTE — ED NOTES
Wound photos have been uploaded pt's media. 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

## 2024-07-25 NOTE — ED NOTES
Pt resting in bed quietly, chest rising and falling, bed in lowest position, side rails x2. Denies needs at this time.

## 2024-07-25 NOTE — ED PROVIDER NOTES
"Encounter Date: 7/25/2024       History     Chief Complaint   Patient presents with    Altered Mental Status     Nursing home reports patient normal A&O x 3. This morning "not acting right", non-verbal, "seems confused". Currently receiving Vancomycin daily     Patient is a 74-year-old male brought in by EMS from his nursing home where recent lab work showed a low potassium level and elevated troponin.  He has a history of hypertension, diabetes and osteomyelitis of the right foot.  He is currently on vancomycin given through a PICC line.  Nursing home personnel also reported that patient seemed confused this morning, although EMS was told by family members that the patient is currently at his baseline.      Review of patient's allergies indicates:  No Known Allergies  Past Medical History:   Diagnosis Date    Arthritis     legs    Bacteremia due to Gram-negative bacteria 3/23/2021    Diabetes mellitus     Diabetes mellitus, type 2     Hyperlipidemia     Hypertension     Osteomyelitis     Palliative care encounter 5/24/2023     Past Surgical History:   Procedure Laterality Date    ABOVE-KNEE AMPUTATION Left 3/27/2024    Procedure: AMPUTATION, ABOVE KNEE;  Surgeon: Adal Arellano MD;  Location: [facility] OR [address] [place] [address];  Service: Vascular;  Laterality: Left;    ANGIOGRAPHY OF LOWER EXTREMITY N/A 2/3/2021    Procedure: Angiogram Extremity Bilateral;  Surgeon: Ernst Chacko MD;  Location: [facility] OR [address] [place] [address];  Service: Peripheral Vascular;  Laterality: N/A;  7.4 mintues fluoroscopy time  816.15 mGy  170.17 Gycm2    AORTOGRAPHY WITH EXTREMITY RUNOFF Bilateral 2/3/2021    Procedure: AORTOGRAM, WITH EXTREMITY RUNOFF;  Surgeon: Ernst Chacko MD;  Location: [facility] OR [address] [place] [address];  Service: Peripheral Vascular;  Laterality: Bilateral;    DEBRIDEMENT OF FOOT Left 2/23/2021    Procedure: DEBRIDEMENT, LEFT HEEL;  Surgeon: Mayra Schroeder DPM;  Location: [facility] OR [address] [place] [address];  Service: Podiatry;  Laterality: Left;    " ESOPHAGOGASTRODUODENOSCOPY N/A 6/28/2024    Procedure: EGD (ESOPHAGOGASTRODUODENOSCOPY);  Surgeon: Adal Chandra MD;  Location: [facility];  Service: Gastroenterology;  Laterality: N/A;    FOOT AMPUTATION  October 2010    left high midfoot amputation    IMPLANTATION OF LEADLESS PACEMAKER N/A 10/12/2023    Procedure: [id number];  Surgeon: VANESSA Delatorre MD;  Location: [facility] EP LAB;  Service: Cardiology;  Laterality: N/A;  AVB, MICRA, EH, [place], [place] [address]     Family History   Problem Relation Name Age of Onset    Diabetes Mother      Heart disease Mother      Stroke Sister      Cancer Brother      Diabetes Sister       Social History     Tobacco Use    Smoking status: Never    Smokeless tobacco: Never   Substance Use Topics    Alcohol use: No     Comment: occassional    Drug use: No     Review of Systems   Constitutional:  Negative for fever.   Gastrointestinal:  Negative for vomiting.   All other systems reviewed and are negative.      Physical Exam     Initial Vitals   BP Pulse Resp Temp SpO2   07/25/24 1045 07/25/24 1045 07/25/24 1045 07/25/24 1047 07/25/24 1200   (!) 178/79 82 13 98.3 °F (36.8 °C) 100 %      MAP       --                Physical Exam    Nursing note and vitals reviewed.  Constitutional: No distress.   HENT:   Head: Atraumatic.   Eyes: Conjunctivae are normal.   Cardiovascular:  Normal rate and regular rhythm.           Abdominal: Abdomen is soft.   Musculoskeletal:      Comments: Left AKA.  Right foot with fresh appearing bandage.     Neurological: He is alert.   Skin: Skin is warm and dry.         ED Course   Procedures  Labs Reviewed   CBC W/ AUTO DIFFERENTIAL - Abnormal       Result Value    WBC 9.92      RBC 3.36 (*)     Hemoglobin 8.8 (*)     Hematocrit 28.4 (*)     MCV 85      MCH 26.2 (*)     MCHC 31.0 (*)     RDW 18.5 (*)     Platelets 373      MPV 9.6      Immature Granulocytes 0.3      Gran # (ANC) 6.5      Immature Grans (Abs) 0.03      Lymph # 1.3       Mono # 0.7      Eos # 1.4 (*)     Baso # 0.06      nRBC 0      Gran % 65.6      Lymph % 13.1 (*)     Mono % 6.8      Eosinophil % 13.6 (*)     Basophil % 0.6      Differential Method Automated     COMPREHENSIVE METABOLIC PANEL - Abnormal    Sodium 146 (*)     Potassium 2.5 (*)     Chloride 112 (*)     CO2 24      Glucose 125 (*)     BUN 18      Creatinine 1.2      Calcium 9.4      Total Protein 7.1      Albumin 1.7 (*)     Total Bilirubin 0.3      Alkaline Phosphatase 74      AST 18      ALT 12      eGFR >60      Anion Gap 10      Narrative:      Potassium critical result(s) called and verbal readback obtained   from Skye Nicholson RN. by [facility] 07/25/2024 13:25   CK - Abnormal    CPK 13 (*)    B-TYPE NATRIURETIC PEPTIDE - Abnormal     (*)    URINALYSIS - Abnormal    Specimen UA Urine, Catheterized      Color, UA Yellow      Appearance, UA Hazy (*)     pH, UA 6.0      Specific Gravity, UA 1.020      Protein, UA 2+ (*)     Glucose, UA Negative      Ketones, UA Trace (*)     Bilirubin (UA) Negative      Occult Blood UA 2+ (*)     Nitrite, UA Negative      Urobilinogen, UA Negative      Leukocytes, UA 3+ (*)    URINALYSIS MICROSCOPIC - Abnormal    RBC, UA 15 (*)     WBC, UA >100 (*)     WBC Clumps, UA Many (*)     Bacteria Rare      Squam Epithel, UA 3      Hyaline Casts, UA 0      Unclass Brainna UA 9      Microscopic Comment SEE COMMENT     POTASSIUM - Abnormal    Potassium 2.4 (*)     Narrative:       Potassium critical result(s) called and verbal readback obtained   from Skye Nicholson RN. by [facility] 07/25/2024 16:32   URINALYSIS - Abnormal    Specimen UA Urine, Catheterized      Color, UA Yellow      Appearance, UA Hazy (*)     pH, UA 6.0      Specific Gravity, UA 1.015      Protein, UA 2+ (*)     Glucose, UA Negative      Ketones, UA Negative      Bilirubin (UA) Negative      Occult Blood UA 2+ (*)     Nitrite, UA Negative      Urobilinogen, UA Negative      Leukocytes, UA 1+ (*)    URINALYSIS MICROSCOPIC  - Abnormal    RBC, UA 4      WBC, UA 44 (*)     Bacteria Rare      Squam Epithel, UA 0      Hyaline Casts, UA 0      Unclass Brianna UA 5      Microscopic Comment SEE COMMENT     BASIC METABOLIC PANEL - Abnormal    Sodium 148 (*)     Potassium 3.0 (*)     Chloride 115 (*)     CO2 25      Glucose 107      BUN 18      Creatinine 1.1      Calcium 9.2      Anion Gap 8      eGFR >60     PHOSPHORUS - Abnormal    Phosphorus 2.1 (*)    LIPID PANEL - Abnormal    Cholesterol 108 (*)     Triglycerides 52      HDL 43      LDL Cholesterol 54.6 (*)     HDL/Cholesterol Ratio 39.8      Total Cholesterol/HDL Ratio 2.5      Non-HDL Cholesterol 65     HEMOGLOBIN A1C - Abnormal    Hemoglobin A1C 6.0 (*)     Estimated Avg Glucose 126     C-REACTIVE PROTEIN - Abnormal    CRP 70.7 (*)    CULTURE, URINE   CULTURE, URINE   CULTURE, BLOOD   CULTURE, BLOOD   TROPONIN I    Troponin I 0.020     MAGNESIUM    Magnesium 1.8     LACTIC ACID, PLASMA    Lactate (Lactic Acid) 1.2     TROPONIN I    Troponin I 0.021     TSH    TSH 1.563            Imaging Results              X-Ray Chest AP Portable (Final result)  Result time 07/25/24 22:20:40      Final result by Seymour Adkins MD (07/25/24 22:20:40)                   Impression:      Stable chest with no acute findings.      Electronically signed by: Seymour Adkins  Date:    07/25/2024  Time:    22:20               Narrative:    EXAMINATION:  XR CHEST AP PORTABLE    CLINICAL HISTORY:  AMS;    TECHNIQUE:  Single frontal view of the chest was performed.    COMPARISON:  07/25/2021    FINDINGS:  Heart is not enlarged the trachea is midline.  Hypoventilatory changes with PICC line unchanged.                                       CT Head Without Contrast (Final result)  Result time 07/25/24 17:38:04      Final result by Hai Vargas MD (07/25/24 17:38:04)                   Impression:      No CT evidence of acute intracranial abnormality. Clinical correlation and further evaluation as  warranted.    Chronic senescent and microvascular ischemic change.      Electronically signed by: Hai Vargas MD  Date:    07/25/2024  Time:    17:38               Narrative:    EXAMINATION:  CT HEAD WITHOUT CONTRAST    CLINICAL HISTORY:  encephalopathy;    TECHNIQUE:  Low dose axial images were obtained through the head.  Coronal and sagittal reformations were also performed. Contrast was not administered.    COMPARISON:  CT head 05/19/2024    FINDINGS:  There is generalized cerebral volume loss with compensatory sulcal widening and ventricular enlargement.  There is patchy periventricular white matter hypoattenuation suggesting sequelae of chronic microvascular ischemic change.  No CT evidence of acute intracranial hemorrhage or midline shift.  The basal cisterns are patent. The mastoid air cells and paranasal sinuses are clear of acute process. No acute displaced calvarial fracture identified.                                       Medications   sodium chloride 0.9% flush 10 mL (has no administration in time range)   naloxone 0.4 mg/mL injection 0.02 mg (has no administration in time range)   glucose chewable tablet 16 g (has no administration in time range)   glucose chewable tablet 24 g (has no administration in time range)   glucagon (human recombinant) injection 1 mg (has no administration in time range)   insulin aspart U-100 pen 0-5 Units (has no administration in time range)   dextrose 10% bolus 125 mL 125 mL (has no administration in time range)   dextrose 10% bolus 250 mL 250 mL (has no administration in time range)   acetaminophen tablet 650 mg (has no administration in time range)   apixaban tablet 5 mg (0 mg Oral Hold 7/26/24 0900)   ceFEPIme (MAXIPIME) 2 g in D5W 100 mL IVPB (MB+) (0 g Intravenous Stopped 7/26/24 0542)   gabapentin capsule 300 mg (0 mg Oral Hold 7/26/24 0900)   insulin glargine U-100 (Lantus) pen 6 Units (6 Units Subcutaneous Given 7/26/24 0828)   NIFEdipine 24 hr tablet 90 mg (0  mg Oral Hold 7/26/24 0900)   oxyCODONE immediate release tablet 5 mg (has no administration in time range)   pantoprazole EC tablet 40 mg (0 mg Oral Hold 7/26/24 0900)   tamsulosin 24 hr capsule 0.4 mg (0 mg Oral Hold 7/26/24 0900)   vancomycin - pharmacy to dose (has no administration in time range)   white petrolatum 41 % ointment (has no administration in time range)   vancomycin 1,500 mg in D5W 250 mL IVPB (Vial-Mate) (has no administration in time range)   potassium, sodium phosphates 280-160-250 mg packet 1 packet (1 packet Oral Given 7/26/24 0726)   potassium chloride SA CR tablet 40 mEq (40 mEq Oral Given 7/25/24 1350)   cefTRIAXone (Rocephin) 1 g in D5W 100 mL IVPB (MB+) (0 g Intravenous Stopped 7/25/24 1511)   potassium chloride SA CR tablet 20 mEq (20 mEq Oral Given 7/25/24 1449)   potassium chloride 40 mEq in 100 mL IVPB (FOR CENTRAL LINE ADMINISTRATION ONLY) (0 mEq Intravenous Stopped 7/25/24 2154)   potassium chloride SA CR tablet 40 mEq (40 mEq Oral Given 7/25/24 2247)   magnesium sulfate 2g in water 50mL IVPB (premix) (0 g Intravenous Stopped 7/26/24 0212)   potassium, sodium phosphates 280-160-250 mg packet 2 packet (2 packets Oral Given 7/26/24 0006)   vancomycin 2 g in dextrose 5 % 500 mL IVPB (0 mg Intravenous Stopped 7/26/24 0455)   morphine injection 1 mg (1 mg Intravenous Given 7/26/24 0618)   potassium chloride 10 mEq in 100 mL IVPB (10 mEq Intravenous New Bag 7/26/24 0827)   potassium bicarbonate disintegrating tablet 40 mEq (40 mEq Oral Given 7/26/24 0726)   magnesium sulfate 2g in water 50mL IVPB (premix) (2 g Intravenous New Bag 7/26/24 0724)     Medical Decision Making  Emergent evaluation of a 74-year-old male sent from his nursing home after he was noted with a low potassium level on lab work drawn a couple of days ago.  There was initial concerns about confusion and an altered mental status, although family reported that this has baseline functioning for the patient.  Lab work here  shows potassium of 2.5 for which the patient was orally supplemented.  Urinalysis with signs of infection and urine culture was ordered and he was given 1 g of Rocephin intravenously.  Repeat potassium level has been ordered.  This has pending at the time of shift change and further care of the patient will be turned over to Dr. Damian, pending results, reassessment and final disposition.    Amount and/or Complexity of Data Reviewed  Labs: ordered.     Details: CBC with a hemoglobin of 8.8 and hematocrit of 28.4.  CMP with a potassium of 2.5.  Urinalysis with >100 WBC and white blood cell clumps.  Lactic acid is 1.2.  Radiology: ordered.    Risk  Prescription drug management.                                      Clinical Impression:  Final diagnoses:  [E87.6] Hypokalemia (Primary)  [T83.511A, N39.0] Urinary tract infection associated with indwelling urethral catheter, initial encounter  [R41.82] AMS (altered mental status)  [R41.82] Altered mental status          ED Disposition Condition    Observation                 Alexander Nichole MD  07/26/24 0961

## 2024-07-25 NOTE — ED NOTES
Piv attempted x2 per md, pt has picc, labs drawn from pic line, other rn to attempt iv at this time

## 2024-07-25 NOTE — ED NOTES
Per kylee Lyles to get urine specimen from current fung. No need to replace fung at this time. Pt resting in bed quietly, chest rising and falling, bed in lowest position, side rails x2.

## 2024-07-26 PROBLEM — E87.0 HYPERNATREMIA: Status: ACTIVE | Noted: 2024-07-26

## 2024-07-26 LAB
ALBUMIN SERPL BCP-MCNC: 1.7 G/DL (ref 3.5–5.2)
ALP SERPL-CCNC: 78 U/L (ref 55–135)
ALT SERPL W/O P-5'-P-CCNC: 10 U/L (ref 10–44)
ANION GAP SERPL CALC-SCNC: 11 MMOL/L (ref 8–16)
APICAL FOUR CHAMBER EJECTION FRACTION: 43 %
APICAL TWO CHAMBER EJECTION FRACTION: 57 %
ASCENDING AORTA: 3.63 CM
AST SERPL-CCNC: 21 U/L (ref 10–40)
AV INDEX (PROSTH): 0.74
AV MEAN GRADIENT: 3 MMHG
AV PEAK GRADIENT: 5 MMHG
AV REGURGITATION PRESSURE HALF TIME: 628.35 MS
AV VALVE AREA BY VELOCITY RATIO: 3.81 CM²
AV VALVE AREA: 3.74 CM²
AV VELOCITY RATIO: 0.75
BACTERIA UR CULT: NO GROWTH
BASOPHILS # BLD AUTO: 0.09 K/UL (ref 0–0.2)
BASOPHILS NFR BLD: 0.8 % (ref 0–1.9)
BILIRUB SERPL-MCNC: 0.3 MG/DL (ref 0.1–1)
BSA FOR ECHO PROCEDURE: 1.97 M2
BUN SERPL-MCNC: 18 MG/DL (ref 8–23)
CALCIUM SERPL-MCNC: 9.3 MG/DL (ref 8.7–10.5)
CHLORIDE SERPL-SCNC: 113 MMOL/L (ref 95–110)
CO2 SERPL-SCNC: 23 MMOL/L (ref 23–29)
CREAT SERPL-MCNC: 1.1 MG/DL (ref 0.5–1.4)
CREAT UR-MCNC: 59.7 MG/DL (ref 23–375)
CV ECHO LV RWT: 0.54 CM
DIFFERENTIAL METHOD BLD: ABNORMAL
DOP CALC AO PEAK VEL: 1.13 M/S
DOP CALC AO VTI: 22.1 CM
DOP CALC LVOT AREA: 5.1 CM2
DOP CALC LVOT DIAMETER: 2.54 CM
DOP CALC LVOT PEAK VEL: 0.85 M/S
DOP CALC LVOT STROKE VOLUME: 82.55 CM3
DOP CALCLVOT PEAK VEL VTI: 16.3 CM
E/E' RATIO: 9.78 M/S
ECHO LV POSTERIOR WALL: 1.29 CM (ref 0.6–1.1)
EOSINOPHIL # BLD AUTO: 1.4 K/UL (ref 0–0.5)
EOSINOPHIL NFR BLD: 12.8 % (ref 0–8)
ERYTHROCYTE [DISTWIDTH] IN BLOOD BY AUTOMATED COUNT: 18.7 % (ref 11.5–14.5)
EST. GFR  (NO RACE VARIABLE): >60 ML/MIN/1.73 M^2
FRACTIONAL SHORTENING: 34 % (ref 28–44)
GLUCOSE SERPL-MCNC: 116 MG/DL (ref 70–110)
HCT VFR BLD AUTO: 30.7 % (ref 40–54)
HGB BLD-MCNC: 9.3 G/DL (ref 14–18)
IMM GRANULOCYTES # BLD AUTO: 0.03 K/UL (ref 0–0.04)
IMM GRANULOCYTES NFR BLD AUTO: 0.3 % (ref 0–0.5)
INTERVENTRICULAR SEPTUM: 1.29 CM (ref 0.6–1.1)
LA MAJOR: 5.13 CM
LA MINOR: 5.17 CM
LA WIDTH: 4 CM
LEFT ATRIUM AREA SYSTOLIC (APICAL 2 CHAMBER): 21.13 CM2
LEFT ATRIUM AREA SYSTOLIC (APICAL 4 CHAMBER): 16.91 CM2
LEFT ATRIUM SIZE: 3.46 CM
LEFT ATRIUM VOLUME INDEX MOD: 33.1 ML/M2
LEFT ATRIUM VOLUME INDEX: 30.8 ML/M2
LEFT ATRIUM VOLUME MOD: 65.2 CM3
LEFT ATRIUM VOLUME: 60.58 CM3
LEFT INTERNAL DIMENSION IN SYSTOLE: 3.13 CM (ref 2.1–4)
LEFT VENTRICLE DIASTOLIC VOLUME INDEX: 53.03 ML/M2
LEFT VENTRICLE DIASTOLIC VOLUME: 104.46 ML
LEFT VENTRICLE END DIASTOLIC VOLUME APICAL 2 CHAMBER: 136.75 ML
LEFT VENTRICLE END DIASTOLIC VOLUME APICAL 4 CHAMBER: 68.62 ML
LEFT VENTRICLE END SYSTOLIC VOLUME APICAL 2 CHAMBER: 67.5 ML
LEFT VENTRICLE END SYSTOLIC VOLUME APICAL 4 CHAMBER: 50.29 ML
LEFT VENTRICLE MASS INDEX: 121 G/M2
LEFT VENTRICLE SYSTOLIC VOLUME INDEX: 19.6 ML/M2
LEFT VENTRICLE SYSTOLIC VOLUME: 38.68 ML
LEFT VENTRICULAR INTERNAL DIMENSION IN DIASTOLE: 4.74 CM (ref 3.5–6)
LEFT VENTRICULAR MASS: 238.32 G
LV LATERAL E/E' RATIO: 9.78 M/S
LV SEPTAL E/E' RATIO: 9.78 M/S
LVED V (TEICH): 104.46 ML
LVES V (TEICH): 38.68 ML
LVOT MG: 1.73 MMHG
LVOT MV: 0.62 CM/S
LYMPHOCYTES # BLD AUTO: 1.2 K/UL (ref 1–4.8)
LYMPHOCYTES NFR BLD: 11.2 % (ref 18–48)
MAGNESIUM SERPL-MCNC: 2 MG/DL (ref 1.6–2.6)
MCH RBC QN AUTO: 26.4 PG (ref 27–31)
MCHC RBC AUTO-ENTMCNC: 30.3 G/DL (ref 32–36)
MCV RBC AUTO: 87 FL (ref 82–98)
MONOCYTES # BLD AUTO: 0.8 K/UL (ref 0.3–1)
MONOCYTES NFR BLD: 7.4 % (ref 4–15)
MV PEAK E VEL: 0.88 M/S
NEUTROPHILS # BLD AUTO: 7.4 K/UL (ref 1.8–7.7)
NEUTROPHILS NFR BLD: 67.5 % (ref 38–73)
NRBC BLD-RTO: 0 /100 WBC
OHS LV EJECTION FRACTION SIMPSONS BIPLANE MOD: 50 %
OHS QRS DURATION: 160 MS
OHS QTC CALCULATION: 527 MS
OSMOLALITY UR: 408 MOSM/KG (ref 50–1200)
PHOSPHATE SERPL-MCNC: 2.2 MG/DL (ref 2.7–4.5)
PISA AR MAX VEL: 2.97 M/S
PLATELET # BLD AUTO: 374 K/UL (ref 150–450)
PMV BLD AUTO: 9.7 FL (ref 9.2–12.9)
POCT GLUCOSE: 167 MG/DL (ref 70–110)
POCT GLUCOSE: 167 MG/DL (ref 70–110)
POCT GLUCOSE: 177 MG/DL (ref 70–110)
POCT GLUCOSE: 179 MG/DL (ref 70–110)
POTASSIUM SERPL-SCNC: 3.2 MMOL/L (ref 3.5–5.1)
POTASSIUM UR-SCNC: 44 MMOL/L (ref 15–95)
PROT SERPL-MCNC: 7 G/DL (ref 6–8.4)
PULM VEIN S/D RATIO: 0.92
PV PEAK D VEL: 0.37 M/S
PV PEAK S VEL: 0.34 M/S
RA MAJOR: 5.6 CM
RA PRESSURE ESTIMATED: 3 MMHG
RA WIDTH: 2.21 CM
RBC # BLD AUTO: 3.52 M/UL (ref 4.6–6.2)
RV TISSUE DOPPLER FREE WALL SYSTOLIC VELOCITY 1 (APICAL 4 CHAMBER VIEW): 11.68 CM/S
SINUS: 3.27 CM
SODIUM SERPL-SCNC: 147 MMOL/L (ref 136–145)
SODIUM UR-SCNC: 62 MMOL/L (ref 20–250)
SODIUM UR-SCNC: 62 MMOL/L (ref 20–250)
STJ: 3.39 CM
TDI LATERAL: 0.09 M/S
TDI SEPTAL: 0.09 M/S
TDI: 0.09 M/S
TRICUSPID ANNULAR PLANE SYSTOLIC EXCURSION: 1.96 CM
UUN UR-MCNC: 369 MG/DL (ref 140–1050)
WBC # BLD AUTO: 10.93 K/UL (ref 3.9–12.7)
Z-SCORE OF LEFT VENTRICULAR DIMENSION IN END DIASTOLE: -1.78
Z-SCORE OF LEFT VENTRICULAR DIMENSION IN END SYSTOLE: -0.84

## 2024-07-26 PROCEDURE — 82570 ASSAY OF URINE CREATININE: CPT

## 2024-07-26 PROCEDURE — 80053 COMPREHEN METABOLIC PANEL: CPT

## 2024-07-26 PROCEDURE — 63600175 PHARM REV CODE 636 W HCPCS

## 2024-07-26 PROCEDURE — 96367 TX/PROPH/DG ADDL SEQ IV INF: CPT

## 2024-07-26 PROCEDURE — 83935 ASSAY OF URINE OSMOLALITY: CPT

## 2024-07-26 PROCEDURE — 96375 TX/PRO/DX INJ NEW DRUG ADDON: CPT

## 2024-07-26 PROCEDURE — 25000003 PHARM REV CODE 250

## 2024-07-26 PROCEDURE — 92610 EVALUATE SWALLOWING FUNCTION: CPT

## 2024-07-26 PROCEDURE — 84540 ASSAY OF URINE/UREA-N: CPT

## 2024-07-26 PROCEDURE — 84133 ASSAY OF URINE POTASSIUM: CPT

## 2024-07-26 PROCEDURE — 99223 1ST HOSP IP/OBS HIGH 75: CPT | Mod: ,,, | Performed by: STUDENT IN AN ORGANIZED HEALTH CARE EDUCATION/TRAINING PROGRAM

## 2024-07-26 PROCEDURE — 27000207 HC ISOLATION

## 2024-07-26 PROCEDURE — 96366 THER/PROPH/DIAG IV INF ADDON: CPT

## 2024-07-26 PROCEDURE — 93005 ELECTROCARDIOGRAM TRACING: CPT

## 2024-07-26 PROCEDURE — 84300 ASSAY OF URINE SODIUM: CPT

## 2024-07-26 PROCEDURE — 96376 TX/PRO/DX INJ SAME DRUG ADON: CPT

## 2024-07-26 PROCEDURE — 84100 ASSAY OF PHOSPHORUS: CPT

## 2024-07-26 PROCEDURE — 36415 COLL VENOUS BLD VENIPUNCTURE: CPT

## 2024-07-26 PROCEDURE — 97535 SELF CARE MNGMENT TRAINING: CPT

## 2024-07-26 PROCEDURE — 11000001 HC ACUTE MED/SURG PRIVATE ROOM

## 2024-07-26 PROCEDURE — 83735 ASSAY OF MAGNESIUM: CPT

## 2024-07-26 PROCEDURE — 85025 COMPLETE CBC W/AUTO DIFF WBC: CPT

## 2024-07-26 RX ORDER — POTASSIUM CHLORIDE 7.45 MG/ML
10 INJECTION INTRAVENOUS
Status: COMPLETED | OUTPATIENT
Start: 2024-07-26 | End: 2024-07-26

## 2024-07-26 RX ORDER — MAGNESIUM SULFATE HEPTAHYDRATE 40 MG/ML
2 INJECTION, SOLUTION INTRAVENOUS ONCE
Status: COMPLETED | OUTPATIENT
Start: 2024-07-26 | End: 2024-07-26

## 2024-07-26 RX ORDER — MORPHINE SULFATE 2 MG/ML
1 INJECTION, SOLUTION INTRAMUSCULAR; INTRAVENOUS ONCE
Status: COMPLETED | OUTPATIENT
Start: 2024-07-26 | End: 2024-07-26

## 2024-07-26 RX ORDER — MUPIROCIN 20 MG/G
OINTMENT TOPICAL 2 TIMES DAILY
Status: DISCONTINUED | OUTPATIENT
Start: 2024-07-26 | End: 2024-07-29 | Stop reason: HOSPADM

## 2024-07-26 RX ORDER — GABAPENTIN 100 MG/1
100 CAPSULE ORAL 2 TIMES DAILY
Status: DISCONTINUED | OUTPATIENT
Start: 2024-07-26 | End: 2024-07-29 | Stop reason: HOSPADM

## 2024-07-26 RX ORDER — SODIUM,POTASSIUM PHOSPHATES 280-250MG
1 POWDER IN PACKET (EA) ORAL
Status: DISPENSED | OUTPATIENT
Start: 2024-07-26 | End: 2024-07-26

## 2024-07-26 RX ADMIN — VANCOMYCIN HYDROCHLORIDE 2000 MG: 500 INJECTION, POWDER, LYOPHILIZED, FOR SOLUTION INTRAVENOUS at 02:07

## 2024-07-26 RX ADMIN — MAGNESIUM SULFATE HEPTAHYDRATE 2 G: 40 INJECTION, SOLUTION INTRAVENOUS at 07:07

## 2024-07-26 RX ADMIN — GABAPENTIN 100 MG: 100 CAPSULE ORAL at 10:07

## 2024-07-26 RX ADMIN — APIXABAN 5 MG: 5 TABLET, FILM COATED ORAL at 10:07

## 2024-07-26 RX ADMIN — POTASSIUM BICARBONATE 40 MEQ: 391 TABLET, EFFERVESCENT ORAL at 07:07

## 2024-07-26 RX ADMIN — POTASSIUM & SODIUM PHOSPHATES POWDER PACK 280-160-250 MG 1 PACKET: 280-160-250 PACK at 07:07

## 2024-07-26 RX ADMIN — POTASSIUM & SODIUM PHOSPHATES POWDER PACK 280-160-250 MG 2 PACKET: 280-160-250 PACK at 12:07

## 2024-07-26 RX ADMIN — POTASSIUM CHLORIDE 10 MEQ: 7.46 INJECTION, SOLUTION INTRAVENOUS at 06:07

## 2024-07-26 RX ADMIN — CEFEPIME 2 G: 2 INJECTION, POWDER, FOR SOLUTION INTRAVENOUS at 01:07

## 2024-07-26 RX ADMIN — MAGNESIUM SULFATE HEPTAHYDRATE 2 G: 40 INJECTION, SOLUTION INTRAVENOUS at 12:07

## 2024-07-26 RX ADMIN — MORPHINE SULFATE 1 MG: 2 INJECTION, SOLUTION INTRAMUSCULAR; INTRAVENOUS at 06:07

## 2024-07-26 RX ADMIN — CEFEPIME 2 G: 2 INJECTION, POWDER, FOR SOLUTION INTRAVENOUS at 05:07

## 2024-07-26 RX ADMIN — OXYCODONE 5 MG: 5 TABLET ORAL at 06:07

## 2024-07-26 RX ADMIN — INSULIN GLARGINE 6 UNITS: 100 INJECTION, SOLUTION SUBCUTANEOUS at 08:07

## 2024-07-26 RX ADMIN — PIPERACILLIN SODIUM AND TAZOBACTAM SODIUM 4.5 G: 4; .5 INJECTION, POWDER, LYOPHILIZED, FOR SOLUTION INTRAVENOUS at 04:07

## 2024-07-26 RX ADMIN — INSULIN GLARGINE 6 UNITS: 100 INJECTION, SOLUTION SUBCUTANEOUS at 11:07

## 2024-07-26 RX ADMIN — POTASSIUM & SODIUM PHOSPHATES POWDER PACK 280-160-250 MG 1 PACKET: 280-160-250 PACK at 04:07

## 2024-07-26 RX ADMIN — POTASSIUM CHLORIDE 10 MEQ: 7.46 INJECTION, SOLUTION INTRAVENOUS at 08:07

## 2024-07-26 NOTE — CONSULTS
NEUROLOGY CONSULT EVALUATION    Reason for consult:  Altered Mental Status and Seizures    CC:  Mental Status change    HPI:   Saji Castañeda is a 74 y.o. year old right handed male with a PMH of DMII, PVD, BPH, A-Fib (on eliquis), HTN, CHB s/p PPM,  who presented to [facility] from nursing facility on 7/25/2024 with a chief complaint of altered mental status. Per nursing facility, patient was talking fine, but then became altered, and unable to speak. Patient has left BKA, and has osteomyelitis, of RLE, on cefepime. On exam, patient understands commands, answers some sentences, but speech is dysarthric.     ROS: Pertinent items are noted in HPI.    Histories:     Allergies:  Patient has no known allergies.    Current Medications:    Current Facility-Administered Medications   Medication Dose Route Frequency Provider Last Rate Last Admin    acetaminophen tablet 650 mg  650 mg Oral Q6H PRN Luis Escobedo MD        apixaban tablet 5 mg  5 mg Oral BID Luis Escobedo MD   5 mg at 07/25/24 2246    dextrose 10% bolus 125 mL 125 mL  12.5 g Intravenous PRN Luis Escobedo MD        dextrose 10% bolus 250 mL 250 mL  25 g Intravenous PRN Luis Escobedo MD        gabapentin capsule 100 mg  100 mg Oral BID Luis Escobedo MD        glucagon (human recombinant) injection 1 mg  1 mg Intramuscular PRN Luis Escobedo MD        glucose chewable tablet 16 g  16 g Oral PRN Luis Escobedo MD        glucose chewable tablet 24 g  24 g Oral PRN Luis Escobedo MD        insulin aspart U-100 pen 0-5 Units  0-5 Units Subcutaneous QID (AC + HS) PRN Luis Escobedo MD        insulin glargine U-100 (Lantus) pen 6 Units  6 Units Subcutaneous BID Luis Escobedo MD   6 Units at 07/26/24 0828    mupirocin 2 % ointment   Nasal BID Vinod Elise MD        naloxone 0.4 mg/mL injection 0.02 mg  0.02 mg Intravenous PRN Luis Escobedo MD        NIFEdipine 24 hr tablet 90 mg  90 mg Oral Daily Luis Escobedo MD        oxyCODONE immediate release tablet 5 mg  5 mg Oral Q6H PRN  Luis Escobedo MD        pantoprazole EC tablet 40 mg  40 mg Oral Daily Luis Escobedo MD        potassium, sodium phosphates 280-160-250 mg packet 1 packet  1 packet Oral QID (AC & HS) Luis Escobedo MD   1 packet at 07/26/24 1638    sodium chloride 0.9% flush 10 mL  10 mL Intravenous Q12H PRN Luis Escobedo MD        tamsulosin 24 hr capsule 0.4 mg  0.4 mg Oral Daily Luis Escobedo MD        vancomycin - pharmacy to dose   Intravenous pharmacy to manage frequency Luis Escobedo MD        [START ON 7/27/2024] vancomycin 1,500 mg in D5W 250 mL IVPB (Vial-Mate)  1,500 mg Intravenous Q24H Luis Escobedo MD        white petrolatum 41 % ointment   Topical (Top) Daily Luis Escobedo MD           Past Medical/Surgical/Family History:  PMHx:   Past Medical History:   Diagnosis Date    Arthritis     legs    Bacteremia due to Gram-negative bacteria 3/23/2021    Diabetes mellitus     Diabetes mellitus, type 2     Hyperlipidemia     Hypertension     Osteomyelitis     Palliative care encounter 5/24/2023      Surgeries:   Past Surgical History:   Procedure Laterality Date    ABOVE-KNEE AMPUTATION Left 3/27/2024    Procedure: AMPUTATION, ABOVE KNEE;  Surgeon: Adal Arellano MD;  Location: [facility] OR [address] [place] [address];  Service: Vascular;  Laterality: Left;    ANGIOGRAPHY OF LOWER EXTREMITY N/A 2/3/2021    Procedure: Angiogram Extremity Bilateral;  Surgeon: Ernst Chacko MD;  Location: [facility] OR [address] [place] [address];  Service: Peripheral Vascular;  Laterality: N/A;  7.4 mintues fluoroscopy time  816.15 mGy  170.17 Gycm2    AORTOGRAPHY WITH EXTREMITY RUNOFF Bilateral 2/3/2021    Procedure: AORTOGRAM, WITH EXTREMITY RUNOFF;  Surgeon: Ernst Chacko MD;  Location: [facility] OR [address] [place] [address];  Service: Peripheral Vascular;  Laterality: Bilateral;    DEBRIDEMENT OF FOOT Left 2/23/2021    Procedure: DEBRIDEMENT, LEFT HEEL;  Surgeon: Mayra Schroeder DPM;  Location: [facility] OR [address] [place] [address];  Service: Podiatry;  Laterality: Left;    ESOPHAGOGASTRODUODENOSCOPY N/A  6/28/2024    Procedure: EGD (ESOPHAGOGASTRODUODENOSCOPY);  Surgeon: Adal Chandra MD;  Location: [facility] ENDO;  Service: Gastroenterology;  Laterality: N/A;    FOOT AMPUTATION  October 2010    left high midfoot amputation    IMPLANTATION OF LEADLESS PACEMAKER N/A 10/12/2023    Procedure: [id number];  Surgeon: VANESSA Delatorre MD;  Location: [facility] EP LAB;  Service: Cardiology;  Laterality: N/A;  AVB, MICRA, EH, [place], [place] [address]      Family  Hx:   Family History   Problem Relation Name Age of Onset    Diabetes Mother      Heart disease Mother      Stroke Sister      Cancer Brother      Diabetes Sister      ; no family history of nerve or muscle disease    Social History:  Tobacco: unknown tobacco use  EtOH: history unreliable  Illicit drugs: unkown      Current Evaluation:     Vital Signs:   Vitals:    07/26/24 1613   BP: (!) 171/80   Pulse: 95   Resp: 18   Temp: 97.9 °F (36.6 °C)        Neurological Examination   Gen: Alert and oriented to name, and place only  Language: dysarthria and aphasia at times    CN  CN II - Visual fields intact, PERRLA  CN III, IV, VI - EOM intact without nystagmus  CN V1, V2, V3 - Facial sensation preserved bilaterally  CN VII - Patient is able to smile and raise eyebrows symmetrically  CN VIII - No hearing deficit  CN IX and X - Palate rises symmetrically, uvula is midline  CN XI - Motor strength of shoulder shrug is 5/5 bilaterally  CN XII - Tongue protrudes midline without fasciculation    Motor     Left Right   Shoulder abduction 5/5 5/5   Shoulder adduction 5/5 5/5   Elbow flexion 5/5 5/5   Elbow extension 5/5 5/5   Wrist flexion 5/5 5/5   Wrist extension 5/5 5/5    strength 5/5 5/5   Hip flexion 5/5 5/5   Knee flexion BKA 5/5   Knee extension BKA 5/5   Ankle plantarflexion BKA 5/5   Ankle dorsiflexion BKA 5/5     Normal muscle tone, no significant limitations in range of motion    Sensory: Intact to light touch in BUE and BLE, no neglect    Reflexes      "      Left       Right   Brachioradialis        2+         2+   Bicep        2+         2+   Tricep        2+         2+   Patellar   deferred         2+   Achilles      BKA         2+   Babinski      BKA    Negative     Cerebellar: Significant jerking; unable to perform    Gait: deferred due to patient safety.     LABORATORY STUDIES:  CBC:   Recent Labs   Lab 07/25/24 1117 07/26/24 0309   WBC 9.92 10.93   HGB 8.8* 9.3*   HCT 28.4* 30.7*    374   MCV 85 87   RDW 18.5* 18.7*     BMP:   Recent Labs   Lab 07/25/24 1117 07/25/24 1554 07/25/24 2036 07/25/24 2115 07/26/24 0309   *  --  148*  --  147*   K 2.5* 2.4* 3.0*  --  3.2*   *  --  115*  --  113*   CO2 24  --  25  --  23   BUN 18  --  18  --  18   CREATININE 1.2  --  1.1  --  1.1   *  --  107  --  116*   CALCIUM 9.4  --  9.2  --  9.3   MG 1.8  --   --   --  2.0   PHOS  --   --   --  2.1* 2.2*     LFTs:   Recent Labs   Lab 07/25/24 1117 07/26/24 0309   PROT 7.1 7.0   ALBUMIN 1.7* 1.7*   BILITOT 0.3 0.3   AST 18 21   ALKPHOS 74 78   ALT 12 10     Coags: No results for input(s): "INR", "PROTIME", "PTT" in the last 168 hours.  FLP:   Recent Labs   Lab 07/25/24 2115   CHOL 108*   LDLCALC 54.6*   TRIG 52   CHOLHDL 39.8     DM:   Recent Labs   Lab 07/25/24 1117 07/25/24 2036 07/25/24 2115 07/26/24 0309   HGBA1C  --   --  6.0*  --    LDLCALC  --   --  54.6*  --    CREATININE 1.2 1.1  --  1.1     Thyroid:   Recent Labs   Lab 07/25/24 2114   TSH 1.563     Anemia: No results for input(s): "IRON", "TIBC", "FERRITIN", "[id number]", "FOLATE" in the last 168 hours.  Cardiac:   Recent Labs   Lab 07/25/24  1117 07/25/24  1123 07/25/24  2115   TROPONINI 0.020  --  0.021   BNP  --  173*  --      Urinalysis:   Recent Labs   Lab 07/25/24  1257 07/25/24  1716   COLORU Yellow Yellow   APPEARANCEUA Hazy* Hazy*   PHUR 6.0 6.0   SPECGRAV 1.020 1.015   PROTEINUA 2+* 2+*   GLUCUA Negative Negative   KETONESU Trace* Negative   BILIRUBINUA Negative " Negative   OCCULTUA 2+* 2+*   NITRITE Negative Negative   UROBILINOGEN Negative Negative   LEUKOCYTESUR 3+* 1+*     Urine Micro:  Recent Labs   Lab 07/25/24  1257 07/25/24  1716   RBCUA 15* 4   WBCUA >100* 44*   BACTERIA Rare Rare   SQUAMEPITHEL 3 0   MICROCMT SEE COMMENT SEE COMMENT         RADIOLOGY STUDIES:  I have personally reviewed the images performed.   X-Ray Chest AP Portable   Final Result      Stable chest with no acute findings.         Electronically signed by: Seymour Adkins   Date:    07/25/2024   Time:    22:20      CT Head Without Contrast   Final Result      No CT evidence of acute intracranial abnormality. Clinical correlation and further evaluation as warranted.      Chronic senescent and microvascular ischemic change.         Electronically signed by: Hai Vargas MD   Date:    07/25/2024   Time:    17:38      MRI Brain W WO Contrast    (Results Pending)   MRI Cervical Spine Without Contrast    (Results Pending)   US Lower Extremity Arteries Right    (Results Pending)             Assessment:  SALUD Julien is a 75 y/o male consulted to [facility] for altered mental status, dysarthric speech and seizures. Per primary team, patient at baseline at nursing home, able to converse normally. Speech dysarthric on exam. Unable to perform cerebellar testing due to jerking hand movements. Seizure like events and AMS can be due to cefepime as cefepime lowers seizure threshold. Patient has some UMN signs that may be due to Cervical Stenosis, thus MRI C-Spine is warranted. Seizure history is new, with no prior seizures, thus recommending MRI Brain w w/o contrast. Patient has a history of atrial fibrillation, on eliquis, but concern for shower emboli/stroke. Patient has CHB with Pacemaker, thus will need to go to [facility] for imaging.    Recommendations  - MRI Brain w and w/o Contrast  - MRI C-Spine  - Continue Eliquis BID  - ID recs appreciated  - remainder management per primary  team    Discussed that it is illegal to drive for 6 months following a seizure.  Also advised to avoid operating heavy machinery, swimming alone, taking baths instead of showers, using front burners on the stove, climbing ladders, or other activities that would place himself or someone else at risk should he have a seizure.    Case to be discussed with Dr. Regan Ferris, DO  [facility]

## 2024-07-26 NOTE — PLAN OF CARE
07/26/24 0100   Admission   Initial VN Admission Questions Complete   Communication Issues? None   Shift   Virtual Nurse - Rounding Complete   Pain Management Interventions quiet environment facilitated;relaxation techniques promoted   Virtual Nurse - Patient Verbalized Approval Of VN Rounding;Camera Use   Type of Frequent Check   Type Patient Rounds;Telemetry Monitoring   Safety/Activity   Patient Rounds bed in low position;bed wheels locked;call light in patient/parent reach;clutter free environment maintained;ID band on;visualized patient;placement of personal items at bedside   Safety Promotion/Fall Prevention assistive device/personal item within reach;bed alarm set;side rails raised x 2;Fall Risk reviewed with patient/family;nonskid shoes/socks when out of bed;room near unit station;medications reviewed   Safety Precautions emergency equipment at bedside   Safety Bands on Patient Fall Risk Band   Positioning   Body Position position maintained   Head of Bed (HOB) Positioning HOB elevated   Positioning/Transfer Devices pillows;in use        VIRTUAL NURSE: Pt arrived to unit. Permission received per patient to turn camera to view patient. VIP model explained; patient informed this VN will be working with bedside nurse and the rest of the care team. Plan of care reviewed with patient.  Educated patient on VTE, fall risk and fall risk precautions in place. Call light within reach, side rails up x2. Admission questions completed. Patient instucted to ask staff for assistance. Patient verbalized complete understanding. Patient denies complaints or any needs at this time. Will continue to be available and intervene as needed.

## 2024-07-26 NOTE — ED NOTES
Discussed with Dr. Escobedo, [facility] medicine, also notified that BMP drawn and walked to lab.

## 2024-07-26 NOTE — CONSULTS
[facility] - Telemetry  Wound Care    Patient Name:  Saji Castañeda   MRN:  [id number]  Date: 7/26/2024  Diagnosis: Altered mental status    History:     Past Medical History:   Diagnosis Date    Arthritis     legs    Bacteremia due to Gram-negative bacteria 3/23/2021    Diabetes mellitus     Diabetes mellitus, type 2     Hyperlipidemia     Hypertension     Osteomyelitis     Palliative care encounter 5/24/2023       Social History     Socioeconomic History    Marital status: Single   Tobacco Use    Smoking status: Never    Smokeless tobacco: Never   Substance and Sexual Activity    Alcohol use: No     Comment: occassional    Drug use: No    Sexual activity: Not Currently   Social History Narrative    Not currently working; lives with family     Social Determinants of Health     Financial Resource Strain: Low Risk  (6/27/2024)    Overall Financial Resource Strain (CARDIA)     Difficulty of Paying Living Expenses: Not hard at all   Food Insecurity: No Food Insecurity (6/27/2024)    Hunger Vital Sign     Worried About Running Out of Food in the Last Year: Never true     Ran Out of Food in the Last Year: Never true   Transportation Needs: No Transportation Needs (6/27/2024)    TRANSPORTATION NEEDS     Transportation : No   Physical Activity: Inactive (6/27/2024)    Exercise Vital Sign     Days of Exercise per Week: 0 days     Minutes of Exercise per Session: 0 min   Stress: No Stress Concern Present (6/27/2024)    [employer] [place] [employer] - Occupational Stress Questionnaire     Feeling of Stress : Not at all   Housing Stability: Low Risk  (6/27/2024)    Housing Stability Vital Sign     Unable to Pay for Housing in the Last Year: No     Homeless in the Last Year: No       Precautions:     Allergies as of 07/25/2024    (No Known Allergies)       WOC Assessment Details/Treatment     Pt known to wound care nurse from recent previous admission. Podiatry consulted for foot wounds.      Bilateral buttocks-present on  admit- full thickness pressure injury- [id number]- extends to gluteal cleft and outer R buttock- unsure of history of the stage- slough/pink tissue- healing edges- island of epithleium to R buttock - serosanguinous drainage- no odor        R buttock- Stage 4 pressure injury- present on admit- slough/granulation- moderate serosanguinous drainage-no odor [id number]- tunneling @12 o'clock of 10cm         L posterior thigh/gluteal fold- scar tissue from a healing full thickness pressure injury- dry    Moisture to gluteal folds  L AKA intact with no skin breakdown  Recommendations discussed with nurse and Dr Escobedo:  - Pressure injury prevention interventions - waffle overlay and heel boot  - Vashe gauze wet-to-dry dressing BID and prn incontinent episode    07/26/2024

## 2024-07-26 NOTE — NURSING
Notified on call [facility] hospitalist of patient's bp of 180/70s and facial grimacing when attempting to lay on his side due to his extensive pressure injuries. Per MD will review chart. No orders received at this time.

## 2024-07-26 NOTE — CONSULTS
[facility] - Telemetry  Adult Nutrition  Consult Note    SUMMARY     Recommendations  Recommendation:   1. Continue Minced and Moist Diet with Kvng BID   2. Add diabetic diet 2000 kcal restriction   3. Add Boost Glucose Control TID   4. Encourage PO intake as tolerated   5. Monitor weight and labs    Goals: Pt will consume 50-75% of meals by RD follow up    Nutrition Goal Status: new  Communication of RD Recs:  (POC)    Assessment and Plan  Nutrition Problem  Increased protein needs     Related to (etiology):   Wound healing     Signs and Symptoms (as evidenced by):   Tomas-10/left buttocks, right posterior midline heel, right buttocks     Interventions:  Collaboration with other providers   Commercial beverage   Increased protein diet     Nutrition Diagnosis Status:   New    Malnutrition Assessment  Weight Loss (Malnutrition):  (Noted 91 lb weight loss in 7 months)     Reason for Assessment  Reason For Assessment: consult (wounds)  Diagnosis: psychological disorder (AMS)  Relevant Medical History: Arthritis, DM, HLD  Interdisciplinary Rounds: did not attend  General Information Comments: Pt is currently on a minced and moist diet with Kvng BID. Tomas-10/left buttocks, right posteiror midline heel, buttocks, right buttocks. Noted fair feeding tolerance. s/p left AKA 3/27/24. Noted expressive aphasia. Per chart, pt is able to answer simple questions, but is unable to speak in sentences. Noted chronic sacral and right foot ulcers. Noted 91 lb weight loss in 7 months. Unable to conduct NFPE at this time, pt on contact precautions at time of visit.  Nutrition Discharge Planning: d/c on diabetic diet with ONS TID    Nutrition Risk Screen  Nutrition Risk Screen: large or nonhealing wound, burn or pressure injury    Nutrition/Diet History  Patient Reported Diet/Restrictions/Preferences: heart healthy, diabetic diet  Food Preferences: [place]  Spiritual, Cultural Beliefs, Congregation Practices, Values that Affect Care:  "no  Factors Affecting Nutritional Intake: decreased appetite    Nutrition Related Social Determinants of Health: SDOH: Unable to assess at this time.     Anthropometrics  Temp: 97.9 °F (36.6 °C)  Height Method: Stated  Height: 5' 10" (177.8 cm)  Height (inches): 70 in  Weight Method: Bed Scale  Weight: 78.8 kg (173 lb 11.6 oz)  Weight (lb): 173.72 lb  Ideal Body Weight (IBW), Male: 166 lb  % Ideal Body Weight, Male (lb): 104.65 %  BMI (Calculated): 24.9  BMI Grade: 18.5-24.9 - normal  Usual Body Weight (UBW), k.7 kg (12.15.23)  % Usual Body Weight: 65.97  % Weight Change From Usual Weight: -34.17 %  Amputation %: 5.9  Total Amputation %: 5.9  Amputation Ideal Body Weight (IBW), Male (lb): 160.1 lb  Amputee BMI (kg/m2): 26.56 kg/m2     Lab/Procedures/Meds  Pertinent Labs Reviewed: reviewed  Pertinent Labs Comments: Na 147, K 3.2,   Pertinent Medications Reviewed: reviewed  Pertinent Medications Comments: gabapentin, insulin, pantoprazole, potassium chloride, tamsulosin    Estimated/Assessed Needs  Weight Used For Calorie Calculations: 78.8 kg (173 lb 11.6 oz)  Energy Calorie Requirements (kcal): 2364 kcals (30 kcals/kg)  Energy Need Method: Kcal/kg  Protein Requirements: 94g (1.2g/kg)  Weight Used For Protein Calculations: 78.8 kg (173 lb 11.6 oz)     Estimated Fluid Requirement Method: RDA Method  RDA Method (mL): 2364  CHO Requirement: 295g    Nutrition Prescription Ordered  Current Diet Order: Minced and Moist Diet  Oral Nutrition Supplement: Kvng BID    Evaluation of Received Nutrient/Fluid Intake  I/O: 100/1070  Energy Calories Required: not meeting needs  Protein Required: not meeting needs  Fluid Required: not meeting needs  Comments: LBM-24  Tolerance: not tolerating  % Intake of Estimated Energy Needs: Other: No meal intake noted  % Meal Intake: Other: No meal intake noted     Nutrition Risk  Level of Risk/Frequency of Follow-up:  (2x/weekly)     Monitor and Evaluation  Food and Nutrient " Intake: energy intake, food and beverage intake  Food and Nutrient Adminstration: diet order  Anthropometric Measurements: weight, weight change, body mass index  Biochemical Data, Medical Tests and Procedures: glucose/endocrine profile, gastrointestinal profile, electrolyte and renal panel, inflammatory profile, lipid profile     Nutrition Follow-Up  RD Follow-up?: Yes

## 2024-07-26 NOTE — ED NOTES
[facility] medicine team at the bedside and received report from Day nurse. Potassium Infusing to Left arm PICC.

## 2024-07-26 NOTE — H&P
[facility] Medicine H&P Note     Admitting Team: [facility] Hospitalist Team B  Attending Physician: Vinod Elise MD  Resident: Luis Escobedo MD  Intern: Blake Naranjo MD    Date of Admit: 7/25/2024    Chief Complaint     Decreased mental status per nursing home    Subjective:      History of Present Illness:  Saji Castañeda is a 74 y.o. Male with a PMH of T2DM on long-term insulin, PVD with diabetic ulcer s/p left AKA (3/27/2024), Chronic Multifactorial Anemia, BPH s/p Chronic Sams, paroxysmal A-fib on eliquis, CHB s/p PPM (10/12/2023), Multifactorial HTN, and HLD Osteomyelitis of R foot who presented to ED per nursing home for decreased mental status for 1 day.     The patient was in their usual state of health until this morning when nursing home staff attempted to give him his medications. However, staff noticed that he started to have tremors and had complications speaking his name. Per nursing staff, states patient is normally oriented x3 and very conversational, able to feed himself and use his phone etc. These findings are new which warranted them to call EMS to bring him to the hospital.     During our interview, patient remained tremulous and still presented with expressive aphasia. Patient able to answer simple questions, but is unable to speak in sentences. Denies any chest pain or shortness of breath. Patient actively scratching himself during this interview with multiple excoriations across his chest and back. Per nursing home, patient normally scratches himself like this. Patient has chronic stage for sacral and right foot ulcers. Patient recently admitted to hospital and discharged on 7/11/2024 for chest pain and shortness of breath. Was found to be anemic to 6.3 and received blood transfusions. During this admission as well, he had a bone culture of right foot positive for pseudomonas aeruginosa and MRSA. Patient was started on vancomycin and cefepime on discharge with end date of 8/12. Patient has  been given his medications since at his nursing facility and remained at baseline until this morning.    In ED, patient was afebrile with /60. HR in 80s. Actively tremulous presenting with expressive aphasia. Labs remarkable for hgb 8.8 (at baseline), Na 146, K 2.5, Cl 112, Ua with WBC 44, 1+ LE but patient has chronic fung catheter in place. CT Head unremarkable for acute abnormalities, but has chronic microvascular ischemic changes. EKG with wide QRS rhythm at rate 87. LBBB. no acute ST segment or T wave changes. Patient admitted to inpatient medicine for correction of electrolytes and work up for acute neurologic changes.     Past Surgical History:  Past Surgical History:   Procedure Laterality Date    ABOVE-KNEE AMPUTATION Left 3/27/2024    Procedure: AMPUTATION, ABOVE KNEE;  Surgeon: Adal Arellano MD;  Location: [facility] OR [address] [place] [address];  Service: Vascular;  Laterality: Left;    ANGIOGRAPHY OF LOWER EXTREMITY N/A 2/3/2021    Procedure: Angiogram Extremity Bilateral;  Surgeon: Ernst Chacko MD;  Location: [address];  Service: Peripheral Vascular;  Laterality: N/A;  7.4 mintues fluoroscopy time  816.15 mGy  170.17 Gycm2    AORTOGRAPHY WITH EXTREMITY RUNOFF Bilateral 2/3/2021    Procedure: AORTOGRAM, WITH EXTREMITY RUNOFF;  Surgeon: Ernst Chacko MD;  Location: [address];  Service: Peripheral Vascular;  Laterality: Bilateral;    DEBRIDEMENT OF FOOT Left 2/23/2021    Procedure: DEBRIDEMENT, LEFT HEEL;  Surgeon: Mayra Schroeder DPM;  Location: [address];  Service: Podiatry;  Laterality: Left;    ESOPHAGOGASTRODUODENOSCOPY N/A 6/28/2024    Procedure: EGD (ESOPHAGOGASTRODUODENOSCOPY);  Surgeon: Adal Chandra MD;  Location: [facility];  Service: Gastroenterology;  Laterality: N/A;    FOOT AMPUTATION  October 2010    left high midfoot amputation    IMPLANTATION OF LEADLESS PACEMAKER N/A 10/12/2023    Procedure: OVHJGLQLN-EAYDEROPC-RHGKGOAD;  Surgeon:  VANESSA Delatorre MD;  Location: [facility] EP LAB;  Service: Cardiology;  Laterality: N/A;  AVB, MICRA, EH, [place], [place] [id number]       Allergies:  Review of patient's allergies indicates:  No Known Allergies    Home Medications:  ASA 81  Insulin Lantus 12u BID/Novolog 5u TID with meals  Flomax 0.4   Eliquis 5 BID  Tylenol   Lasix 20 mg daily  Gabapentin 300 BID  Procardia XL 90 mg  Protonix 40  Eliquis 5 mg BID  Zinc 220 mg  Vit C 500 BID  Fe 325  Potassium 20 mEQ BID  Kvng packets  Oxycodone 5 mg q6 PRN      Family History:  Family History   Problem Relation Name Age of Onset    Diabetes Mother      Heart disease Mother      Stroke Sister      Cancer Brother      Diabetes Sister         Social History:  Social History     Tobacco Use    Smoking status: Never    Smokeless tobacco: Never   Substance Use Topics    Alcohol use: No     Comment: occassional    Drug use: No       Review of Systems:  Review of Systems   Constitutional:  Negative for fever.   Respiratory:  Negative for shortness of breath.    Cardiovascular:  Negative for chest pain.   Skin:  Positive for itching.   Neurological:  Positive for tremors.     All other systems are reviewed and are negative.      Health Maintaince :   Primary Care Physician: Nael Diallo MD    Immunizations:   TDap Not UTD    Flu UTD   Pna UTD  COVID UTD    Cancer Screening:  Colonoscopy: Not up to date  Chest CT scan: up to date     Objective:   Last 24 Hour Vital Signs:  BP  Min: 133/81  Max: 178/79  Temp  Av.3 °F (36.8 °C)  Min: 98.3 °F (36.8 °C)  Max: 98.3 °F (36.8 °C)  Pulse  Av.3  Min: 82  Max: 100  Resp  Av.7  Min: 13  Max: 46  SpO2  Av.6 %  Min: 99 %  Max: 100 %  There is no height or weight on file to calculate BMI.  I/O last 3 completed shifts:  In: -   Out: 320 [Urine:320]    Physical Examination:  Physical Exam  Constitutional:       Appearance: He is ill-appearing.   Eyes:      Extraocular Movements: Extraocular movements intact.   Cardiovascular:       Rate and Rhythm: Normal rate.      Pulses: Normal pulses.      Heart sounds: No murmur heard.  Pulmonary:      Effort: Pulmonary effort is normal. No respiratory distress.   Abdominal:      Tenderness: There is no abdominal tenderness.   Musculoskeletal:      Comments: Left AKA. Right foot with stage 4 ulceration positive osteomyelitis. Wrapped and has boot in place. Stage 4 sacral ulcer present. Images in media.    Skin:     Comments: Dry skin to chest and back with multiple excoriations from scratching.    Neurological:      Mental Status: He is alert.      Comments: Unable to produce clear words nor speak in any sentences. Presenting with tremors to bilateral upper extremities.         Laboratory:  Most Recent Data:  CBC:   Lab Results   Component Value Date    WBC 9.92 07/25/2024    HGB 8.8 (L) 07/25/2024    HCT 28.4 (L) 07/25/2024     07/25/2024    MCV 85 07/25/2024    RDW 18.5 (H) 07/25/2024     WBC Differential: 65.6 % N, 13.1 % L, 6.8 % M, 13.6 % Eo, 0.6 % Baso    BMP:   Lab Results   Component Value Date     (H) 07/25/2024    K 2.4 (LL) 07/25/2024     (H) 07/25/2024    CO2 24 07/25/2024    BUN 18 07/25/2024    CREATININE 1.2 07/25/2024     (H) 07/25/2024    CALCIUM 9.4 07/25/2024    MG 1.8 07/25/2024    PHOS 2.3 (L) 05/22/2024     LFTs:   Lab Results   Component Value Date    PROT 7.1 07/25/2024    ALBUMIN 1.7 (L) 07/25/2024    BILITOT 0.3 07/25/2024    AST 18 07/25/2024    ALKPHOS 74 07/25/2024    ALT 12 07/25/2024     Coags:   Lab Results   Component Value Date    INR 1.3 (H) 05/19/2024     FLP:   Lab Results   Component Value Date    CHOL 91 (L) 09/19/2022    HDL 40 09/19/2022    LDLCALC 37.4 (L) 09/19/2022    TRIG 68 09/19/2022    CHOLHDL 44.0 09/19/2022     DM:   Lab Results   Component Value Date    HGBA1C 6.8 (H) 06/26/2024    HGBA1C >14.0 (H) 03/05/2024    HGBA1C 11.0 (H) 09/13/2023    LDLCALC 37.4 (L) 09/19/2022    CREATININE 1.2 07/25/2024     Thyroid:   Lab Results    Component Value Date    TSH 2.04 09/12/2023    FREET4 0.92 03/20/2023     Anemia:   Lab Results   Component Value Date    IRON 46 06/27/2024    TIBC 107 (L) 06/27/2024    FERRITIN 697 (H) 06/27/2024    VKQVTSYE75 504 03/17/2024    FOLATE 4.2 02/19/2021     Cardiac:   Lab Results   Component Value Date    TROPONINI 0.020 07/25/2024     (H) 07/25/2024     Urinalysis:   Lab Results   Component Value Date    LABURIN (A) 05/19/2024     CANDIDA GLABRATA  10,000 - 49,999 cfu/ml  Treatment of asymptomatic candiduria is not recommended (except for   specific populations). Candida isolated in the urine typically   represents colonization. If an indwelling urinary catheter is present  it should be removed or replaced.      COLORU Yellow 07/25/2024    CLARITYU Slightly Cloudy 04/22/2022    SPECGRAV 1.015 07/25/2024    NITRITE Negative 07/25/2024    KETONESU Negative 07/25/2024    UROBILINOGEN Negative 07/25/2024    WBCUA 44 (H) 07/25/2024       Microbiology Data:  BCx x2 7/25/24 pending  UCx pending    Other Results:  EKG (my interpretation): EKG with wide QRS rhythm at rate 87. LBBB. no acute ST segment or T wave changes.    Radiology:  CT Head 7/25/24  Impression:     No CT evidence of acute intracranial abnormality    Xray Chest 7/25/24    Assessment:   Saji Castañeda is a 74 y.o. Male with a PMH of T2DM on long-term insulin, PVD with diabetic ulcer s/p left AKA (3/27/2024), Chronic Multifactorial Anemia, BPH s/p Chronic Sams, paroxysmal A-fib on eliquis, CHB s/p PPM (10/12/2023), Multifactorial HTN, and HLD Osteomyelitis of R foot who presented to ED per nursing home for decreased mental status for 1 day. Per nursing home staff, patient usually oriented x4 and is conversational, able to feed himself on his own. However, now presenting with expressive aphasia as well as tremors to bilateral upper extremities. Labs remarkable for potassium of 2.5, Na 146, Cl 112, , Ua with 3+ Leukocyte Esterase and 44 WBC. CT  Head with no acute abnormalities. EKG with new LBBB and QT prolongation. Patient to be admitted for work up and management of decreased mental status and treatment of electrolyte imbalance.     Plan:     #Expressive aphasia  #Tremors  - patient is a/o x4 at baseline, but now presenting with aphasia and bilateral upper extremity tremors  - CT Head with no acute abnormalities  - patient with hypokalemia 2.5  Plan  - correct potassium, monitor for improvements in neurological status  - TTE ordered  - repeat EKG in morning   - may need MRI if no improvements overnight    #Electrolyte imbalance  - potassium 2.5 -> 3, sodium 146 -> 148, Cl 112  - currently taking lasix 20 mg daily,   Plan  - trend CMPs  - replace potassium PRN  - pending urine osmols, chloride, potassium, urea  - patient taking lasix at home with recently started on long term antibiotics for osteomyelitis. Could be cause of low potassium, will continue to work up      #Osteomyelitis of R foot  #Stage 4 ulcers to R foot and sacrum  #Left AKA  - bone culture of right foot wound positive for MRSA and Pseudomonas  - was started on Cefepime and vancomycin with end date of 8/12  - no leukocytosis and patient afebrile  Plan  - restart antibiotics while inpatient  - trend vitals and cbc      #Paroxysmal Afib  #Complete Heart block s/p Pacemaker  - EKG with wide complex QRS, new LBBB  - on eliquis 5 mg BID  Plan  - repeat EKG in morning s/p electrolyte correction  - pending TTE tomorrow  - restart eliquis       #HTN  - /70s on admission  - home medications include nifedipine XL 90 mg  Plan  - restart home meds  - trend BP      #T2DM  - repeat A1c 6 (6.8 one month ago)  - home medications include Lantus 12u BID/Novolog 5u with meals  Plan  - place on lantus 6u  - on SSI      #Anemia of chronic disease  - hgb 8.8 around baseline  - Iron studies 6/27/24  Fe 46  Transferrin 72  TIBC 107  Sat 43  Ferritin 697  Plan  - Transfuse if <7  - trend CBCs      #BPH s/p  fung placement  - on tamsulosin 0.4 mg  - Ua with 1+ LE and 44 WBC. Chronically infected  Plan  - no need to treat with abx due to chronic fung. Likely colonization       Diet: diabetic  DVT: eliquis  Dispo: admit to medicine for management of hypokalemia as well as work up for acute aphasia and tremors.    Code Status:     Full    Blake Naranjo MD  [facility] Internal Medicine HO-I  [facility] Internal Medicine Team B    [facility] Medicine Hospitalist Pager numbers:   [facility] Hospitalist Medicine Team A (Lorne/Riki): [phone]  [facility] Hospitalist Medicine Team B (Arik/Lucio):  [phone]

## 2024-07-26 NOTE — PLAN OF CARE
Recommendations  Recommendation:   1. Continue Minced and Moist Diet with Kvng BID   2. Add diabetic diet 2000 kcal restriction   3. Add Boost Glucose Control TID   4. Encourage PO intake as tolerated   5. Monitor weight and labs    Goals: Pt will consume 50-75% of meals by RD follow up    Nutrition Goal Status: new  Communication of RD Recs:  (POC)

## 2024-07-26 NOTE — PROGRESS NOTES
"[facility] Medicine Progress Note    Primary Team: [facility] Hospitalist Team B  Attending Physician: Vinod Elise MD  Resident: Luis Escobedo MD  Intern: Blake Naranjo MD    Subjective:      -180/70s. Afebrile. Still presenting with expressive aphasia. Tremors still present as well but not as prominent as yesterday. Patient somewhat unable to tolerate PO, will consult SLP today for eval. Patient follows commands, shakes and nods head to questions. Pending neurology evaluation.      Objective:     Last 24 Hour Vital Signs:  BP  Min: 133/81  Max: 180/72  Temp  Av.1 °F (36.7 °C)  Min: 97.6 °F (36.4 °C)  Max: 98.3 °F (36.8 °C)  Pulse  Av.8  Min: 82  Max: 99  Resp  Av.1  Min: 18  Max: 46  SpO2  Av.6 %  Min: 99 %  Max: 100 %  Height  Av' 10" (177.8 cm)  Min: 5' 10" (177.8 cm)  Max: 5' 10" (177.8 cm)  Weight  Av.8 kg (173 lb 11.6 oz)  Min: 78.8 kg (173 lb 11.6 oz)  Max: 78.8 kg (173 lb 11.6 oz)  I/O last 3 completed shifts:  In: 100 [IV Piggyback:100]  Out: 1070 [Urine:1070]    Physical Examination:  Physical Exam  Constitutional:       Appearance: He is ill-appearing.   Eyes:      Extraocular Movements: Extraocular movements intact.   Cardiovascular:      Rate and Rhythm: Normal rate.      Pulses: Normal pulses.      Heart sounds: No murmur heard.  Pulmonary:      Effort: Pulmonary effort is normal. No respiratory distress.   Abdominal:      Tenderness: There is no abdominal tenderness.   Musculoskeletal:      Comments: Left AKA. Right foot with stage 4 ulceration positive osteomyelitis. Wrapped and has boot in place. Stage 4 sacral ulcer present. Images in media.    Skin:     Comments: Dry skin to chest and back with multiple excoriations from scratching.    Neurological:      Mental Status: He is alert.      Comments: Unable to produce clear words nor speak in any sentences. Presenting with tremors to bilateral upper extremities that has improved slightly.      Laboratory:  Recent Labs "   Lab 07/25/24  1117 07/25/24  1554 07/25/24  2036 07/26/24  0309   WBC 9.92  --   --  10.93   HGB 8.8*  --   --  9.3*   HCT 28.4*  --   --  30.7*     --   --  374   MCV 85  --   --  87   RDW 18.5*  --   --  18.7*   *  --  148* 147*   K 2.5* 2.4* 3.0* 3.2*   *  --  115* 113*   CO2 24  --  25 23   BUN 18  --  18 18   CREATININE 1.2  --  1.1 1.1   *  --  107 116*   PROT 7.1  --   --  7.0   ALBUMIN 1.7*  --   --  1.7*   BILITOT 0.3  --   --  0.3   AST 18  --   --  21   ALKPHOS 74  --   --  78   ALT 12  --   --  10     Laboratory Data Reviewed:  Pertinent Findings:  Phos 2.2  K 2.5 -> 3.2  Na 148 -> 147    Microbiology Data Reviewed:  Pertinent Findings:  BCx x2 pending    Other Results:  EKG (my interpretation): EKG with wide QRS rhythm at rate 87. LBBB. no acute ST segment or T wave changes.     Radiology:  CT Head 7/25/24  Impression:     No CT evidence of acute intracranial abnormality. Clinical correlation and further evaluation as warranted.     Chronic senescent and microvascular ischemic change.     Xray Chest 7/25/24  Impression:     Stable chest with no acute findings.     Current Medications:     Infusions:       Scheduled:   apixaban  5 mg Oral BID    ceFEPIme (MAXIPIME) 2 g in D5W 100 mL IVPB (MB+)  2 g Intravenous Q8H    gabapentin  300 mg Oral BID    insulin glargine U-100 (Lantus)  6 Units Subcutaneous BID    NIFEdipine  90 mg Oral Daily    pantoprazole  40 mg Oral Daily    potassium, sodium phosphates  1 packet Oral QID (AC & HS)    tamsulosin  0.4 mg Oral Daily    [START ON 7/27/2024] vancomycin (VANCOCIN) IV (PEDS and ADULTS)  1,500 mg Intravenous Q24H    white petrolatum   Topical (Top) Daily        PRN:    Current Facility-Administered Medications:     acetaminophen, 650 mg, Oral, Q6H PRN    dextrose 10%, 12.5 g, Intravenous, PRN    dextrose 10%, 25 g, Intravenous, PRN    glucagon (human recombinant), 1 mg, Intramuscular, PRN    glucose, 16 g, Oral, PRN    glucose, 24 g,  Oral, PRN    insulin aspart U-100, 0-5 Units, Subcutaneous, QID (AC + HS) PRN    naloxone, 0.02 mg, Intravenous, PRN    oxyCODONE, 5 mg, Oral, Q6H PRN    sodium chloride 0.9%, 10 mL, Intravenous, Q12H PRN    Pharmacy to dose Vancomycin consult, , , Once **AND** vancomycin - pharmacy to dose, , Intravenous, pharmacy to manage frequency    Antibiotics and Day Number of Therapy:  Cefepime IV  Vancomycin IV    Lines and Day Number of Therapy:  PICC  Urethral catheter    Assessment:     Saji Castañeda is a 74 y.o. Male with a PMH of T2DM on long-term insulin, PVD with diabetic ulcer s/p left AKA (3/27/2024), Chronic Multifactorial Anemia, BPH s/p Chronic Sams, paroxysmal A-fib on eliquis, CHB s/p PPM (10/12/2023), Multifactorial HTN, and HLD Osteomyelitis of R foot who presented to ED per nursing home for decreased mental status for 1 day. Per nursing home staff, patient usually oriented x4 and is conversational, able to feed himself on his own. However, now presenting with expressive aphasia as well as tremors to bilateral upper extremities. Labs remarkable for potassium of 2.5, Na 146, Cl 112, , Ua with 3+ Leukocyte Esterase and 44 WBC. CT Head with no acute abnormalities. EKG with new LBBB and QT prolongation. LBBB can be seen with pacemaker ventricular pacing. Patient to be admitted for work up and management of decreased mental status and treatment of electrolyte imbalance. Mental status still not improving with correction of electrolytes. Consulted Neurology, pending recs. Nephrology consulted for electrolyte replacement recommendations.      Plan:     #Expressive aphasia  #Tremors  - patient is a/o x4 at baseline, but now presenting with aphasia and bilateral upper extremity tremors  - CT Head with no acute abnormalities  - patient with hypokalemia 2.5  - mental status still not improving with correction of electrolytes  Plan  - correct potassium, monitor for improvements in neurological status  - TTE  ordered  - repeat EKG in morning   - may need MRI   - pending neurology recommendations     #Electrolyte imbalance  - potassium 2.5 -> 3, sodium 146 -> 148, Cl 112  - currently taking lasix 20 mg daily,   Plan  - trend CMPs  - replace potassium PRN  - pending urine osmols, chloride, potassium, urea  - patient taking lasix at home with recently started on long term antibiotics for osteomyelitis. Could be cause of low potassium, will continue to work up  - nephrology consulted, pending recs.        #Osteomyelitis of R foot  #Stage 4 ulcers to R foot and sacrum  #Left AKA  - bone culture of right foot wound positive for MRSA and Pseudomonas  - was started on Cefepime and vancomycin with end date of 8/12  - no leukocytosis and patient afebrile  Plan  - restart antibiotics while inpatient  - trend vitals and cbc  - consulted wound care and podiatry.        #Paroxysmal Afib  #Complete Heart block s/p Pacemaker  - EKG with wide complex QRS, new LBBB which can be seen in patients with pacemaker that is ventricular paced  - on eliquis 5 mg BID  Plan  - repeat EKG in morning s/p electrolyte correction  - pending TTE today  - restart eliquis        #HTN  - /70s on admission  - home medications include nifedipine XL 90 mg  Plan  - restart home meds  - trend BP        #T2DM  - repeat A1c 6 (6.8 one month ago)  - home medications include Lantus 12u BID/Novolog 5u with meals  Plan  - place on lantus 6u  - on SSI        #Anemia of chronic disease  - hgb 8.8 around baseline  - Iron studies 6/27/24  Fe 46  Transferrin 72  TIBC 107  Sat 43  Ferritin 697  Plan  - Transfuse if <7  - trend CBCs        #BPH s/p fung placement  - on tamsulosin 0.4 mg  - Ua with 1+ LE and 44 WBC. Chronically infected  Plan  - no need to treat with abx due to chronic fung. Likely colonization         Diet: diabetic  DVT: eliquis  Dispo: admit to medicine for management of hypokalemia as well as work up for acute aphasia and tremors. Pending neurology  evaluation. TTE today.     Code Status:      Full    Blake Naranjo MD  [facility] Internal Medicine HO-I    [facility] Medicine Hospitalist Pager numbers:   [facility] Hospitalist Medicine Team A (Lorne/Riki): [phone]  [facility] Hospitalist Medicine Team B (Arik/Lucio):  [phone]

## 2024-07-26 NOTE — ED NOTES
Called 4B to give report and advised that potassium is 3.0. Laurie, Charge nurse reports patient's room to be moved from 475 to 471 and call back in 25 minutes once bed assigned. Advised Charge Nurse.

## 2024-07-26 NOTE — H&P
U Nephrology Consult Note    Admitting Team: U Hospitalist Team B  Attending Physician: Vinod Elise MD  Resident: Luis Escobedo MD  Intern: Blake Naranjo MD     Date of Admit: 7/25/2024     Chief Complaint      Decreased mental status per nursing home     Subjective:      History of Present Illness:  Saji Castañeda is a 74 y.o. Male with a PMH of T2DM on long-term insulin, PVD with diabetic ulcer s/p left AKA (3/27/2024), Chronic Multifactorial Anemia, BPH s/p Chronic Sams, paroxysmal A-fib on eliquis, CHB s/p PPM (10/12/2023), CKD3, Multifactorial HTN, and HLD Osteomyelitis of R foot who presented to ED per nursing home for decreased mental status for 1 day.      The patient was in their usual state of health until this morning when nursing home staff attempted to give him his medications. However, staff noticed that he started to have tremors and had complications speaking his name. Per nursing staff, states patient is normally oriented x3 and very conversational, able to feed himself and use his phone etc. These findings are new which warranted them to call EMS to bring him to the hospital.      During our interview, patient remained tremulous and still presented with expressive aphasia. Patient able to answer simple questions, but is unable to speak in sentences. Denies any chest pain or shortness of breath. Patient actively scratching himself during this interview with multiple excoriations across his chest and back. Per nursing home, patient normally scratches himself like this. Patient has chronic stage for sacral and right foot ulcers. Patient recently admitted to hospital and discharged on 7/11/2024 for chest pain and shortness of breath. Was found to be anemic to 6.3 and received blood transfusions. During this admission as well, he had a bone culture of right foot positive for pseudomonas aeruginosa and MRSA. Patient was started on vancomycin and cefepime on discharge with end date of 8/12. Patient  "has been given his medications since at his nursing facility and remained at baseline until this morning.     In ED, patient was afebrile with /60. HR in 80s. Actively tremulous presenting with expressive aphasia. Labs remarkable for hgb 8.8 (at baseline), Na 146, K 2.5, Cl 112, Ua with WBC 44, 1+ LE but patient has chronic fung catheter in place. CT Head unremarkable for acute abnormalities, but has chronic microvascular ischemic changes. EKG with wide QRS rhythm at rate 87. LBBB. no acute ST segment or T wave changes. Patient admitted to inpatient medicine for correction of electrolytes and work up for acute neurologic changes.        Date of Admit: 7/25/2024    Chief Complaint/Reason for Consult     Altered Mental Status (Nursing home reports patient normal A&O x 3. This morning "not acting right", non-verbal, "seems confused". Currently receiving Vancomycin daily)  Hypokalemia (2.5 on admit, 3.2 this AM)    Subjective:     OVERNIGHT  Patient still altered, but awake, attempts spontaneous interaction  K+ improved to 3.2 from 2.5 with repletion      Past Medical History:  Past Medical History:   Diagnosis Date    Arthritis     legs    Bacteremia due to Gram-negative bacteria 3/23/2021    Diabetes mellitus     Diabetes mellitus, type 2     Hyperlipidemia     Hypertension     Osteomyelitis     Palliative care encounter 5/24/2023       Past Surgical History:  Past Surgical History:   Procedure Laterality Date    ABOVE-KNEE AMPUTATION Left 3/27/2024    Procedure: AMPUTATION, ABOVE KNEE;  Surgeon: Adal Arellano MD;  Location: [facility] OR [address] [place] [address];  Service: Vascular;  Laterality: Left;    ANGIOGRAPHY OF LOWER EXTREMITY N/A 2/3/2021    Procedure: Angiogram Extremity Bilateral;  Surgeon: Ernst Chacko MD;  Location: [facility] OR [address] [place] [address];  Service: Peripheral Vascular;  Laterality: N/A;  7.4 mintues fluoroscopy time  816.15 mGy  170.17 Gycm2    AORTOGRAPHY WITH EXTREMITY RUNOFF Bilateral 2/3/2021    " "Procedure: AORTOGRAM, WITH EXTREMITY RUNOFF;  Surgeon: Ernst Chacko MD;  Location: [facility] OR 2ND FLR;  Service: Peripheral Vascular;  Laterality: Bilateral;    DEBRIDEMENT OF FOOT Left 2/23/2021    Procedure: DEBRIDEMENT, LEFT HEEL;  Surgeon: Mayra Schroeder DPM;  Location: [facility] OR 1ST FLR;  Service: Podiatry;  Laterality: Left;    ESOPHAGOGASTRODUODENOSCOPY N/A 6/28/2024    Procedure: EGD (ESOPHAGOGASTRODUODENOSCOPY);  Surgeon: Adal Chandra MD;  Location: [facility] ENDO;  Service: Gastroenterology;  Laterality: N/A;    FOOT AMPUTATION  October 2010    left high midfoot amputation    IMPLANTATION OF LEADLESS PACEMAKER N/A 10/12/2023    Procedure: [id number];  Surgeon: VANESSA Delatorre MD;  Location: [facility] EP LAB;  Service: Cardiology;  Laterality: N/A;  AVB, MICRA, EH, [place]ES, [place] [id number]       Allergies:  Review of patient's allergies indicates:  No Known Allergies    Home Medications:  Prior to Admission medications    Medication Sig Start Date End Date Taking? Authorizing Provider   acetaminophen (TYLENOL) 325 MG tablet Take 650 mg by mouth every 6 (six) hours as needed for Pain.   Yes Provider, Historical   acidophilus-pectin, citrus 100 million cell-10 mg Cap Take 1 capsule by mouth 2 (two) times a day.   Yes Provider, Historical   apixaban (ELIQUIS) 5 mg Tab Take 1 tablet (5 mg total) by mouth 2 (two) times daily. 4/3/24  Yes Jori Byrd MD   arginine-glutamine-calcium HMB (HARITHA) 7-7-1.5 gram PwPk Take 1 packet by mouth 2 (two) times a day.   Yes Provider, Historical   ascorbic acid, vitamin C, (VITAMIN C) 500 MG tablet Take 500 mg by mouth 2 (two) times daily.   Yes Provider, Historical   BD AUTOSHIELD DUO PEN NEEDLE 30 gauge x 3/16" Ndle  11/3/23  Yes Provider, Historical   BD ULTRA-FINE ADITYA PEN NEEDLE 32 gauge x 5/32" Ndle USE FOUR TIMES DAILY WITH MEALS AND NIGHTLY 11/20/19  Yes Miriam Duong NP   blood sugar diagnostic (CONTOUR TEST STRIPS) Strp 1 strip by " Misc.(Non-Drug; Combo Route) route 4 (four) times daily. Use to check blood glucose 4x daily 4/5/24  Yes Jori Byrd MD   D5W PgBk 100 mL with ceFEPIme 2 gram SolR 2 g Inject 2 g into the vein every 8 (eight) hours. 7/11/24  Yes Lo Melgar, NP   D5W SolP 250 mL with vancomycin 1.25 gram SolR 1,250 mg Inject 1,250 mg into the vein Daily. 7/11/24  Yes Lo Melgar, NP   ergocalciferol (ERGOCALCIFEROL) 50,000 unit Cap Take 1 capsule (50,000 Units total) by mouth every 7 days. for 10 doses  Patient taking differently: Take 50,000 Units by mouth Every Friday. 5/23/24 7/26/24 Yes Sky Ty MD   ferrous sulfate 325 (65 FE) MG EC tablet Take 325 mg by mouth 2 (two) times daily.   Yes Provider, Historical   furosemide (LASIX) 20 MG tablet Take 1 tablet (20 mg total) by mouth once daily. 4/3/24 4/3/25 Yes Jori Byrd MD   gabapentin (NEURONTIN) 300 MG capsule Take 300 mg by mouth 2 (two) times daily. 11/3/23  Yes Provider, Historical   insulin aspart U-100 (NOVOLOG) 100 unit/mL (3 mL) InPn pen Inject 5 Units into the skin 3 (three) times daily with meals. 4/5/24 4/5/25 Yes Jori Byrd MD   insulin aspart U-100 (NOVOLOG) 100 unit/mL injection Inject 2-10 Units into the skin 3 (three) times daily before meals. 0-149=0  150-200: 2 units  201-250: 4 units  251-300: 6 units  301-350: 8 units  351-1000=10 units, NOTIFY MD   Yes Provider, Historical   lancets (MICROLET LANCET) Misc 1 each by Misc.(Non-Drug; Combo Route) route 4 (four) times daily. Use to check blood sugars 4x daily 4/5/24  Yes Jori Byrd MD   LANTUS SOLOSTAR U-100 INSULIN 100 unit/mL (3 mL) InPn pen Inject 12 Units into the skin 2 (two) times a day. 6/21/24  Yes Provider, Historical   menthol-zinc oxide (CALMOSEPTINE) 0.44-20.6 % Oint Apply topically as needed.   Yes Provider, Historical   multivitamin (THERAGRAN) per tablet Take 1 tablet by mouth once daily.   Yes Provider, Historical   NIFEdipine (PROCARDIA-XL)  "90 MG (OSM) 24 hr tablet Take 1 tablet (90 mg total) by mouth once daily. 24 Yes Jori Byrd MD   oxyCODONE (ROXICODONE) 5 MG immediate release tablet Take 1 tablet (5 mg total) by mouth every 6 (six) hours as needed for Pain. 24  Yes Nael Diallo MD   pantoprazole (PROTONIX) 40 MG tablet Take 40 mg by mouth once daily. Before breakfast 23  Yes Provider, Historical   potassium chloride SA (K-DUR,KLOR-CON) 20 MEQ tablet Take 20 mEq by mouth 2 (two) times daily. 24  Yes Provider, Historical   povidone-iodine (BETADINE) 10 % external solution Apply topically as needed for Wound Care.   Yes Provider, Historical   SANTYL ointment Apply topically once daily. unknown 24  Yes Provider, Historical   tamsulosin (FLOMAX) 0.4 mg Cap Take 0.4 mg by mouth once daily.   Yes Provider, Historical   TRUE METRIX GLUCOSE METER [facility]  24  Yes Provider, Historical   zinc sulfate (ZINCATE) 50 mg zinc (220 mg) capsule Take 220 mg by mouth every morning.   Yes Provider, Historical   hydroCHLOROthiazide (HYDRODIURIL) 25 MG tablet Take 0.5 tablets (12.5 mg total) by mouth every Mon, Wed, Fri. Beginning on 2022  Keerthi Mayorga MD       Family History:  Family History   Problem Relation Name Age of Onset    Diabetes Mother      Heart disease Mother      Stroke Sister      Cancer Brother      Diabetes Sister         Social History:  Social History     Tobacco Use    Smoking status: Never    Smokeless tobacco: Never   Substance Use Topics    Alcohol use: No     Comment: occassional    Drug use: No       Review of Systems:  AMS, unable to provide     Objective:   Last 24 Hour Vital Signs:  BP  Min: 133/81  Max: 180/72  Temp  Av.1 °F (36.7 °C)  Min: 97.6 °F (36.4 °C)  Max: 98.3 °F (36.8 °C)  Pulse  Av  Min: 82  Max: 100  Resp  Av.1  Min: 13  Max: 46  SpO2  Av.6 %  Min: 99 %  Max: 100 %  Height  Av' 10" (177.8 cm)  Min: 5' 10" (177.8 cm)  Max: 5' 10" (177.8 " cm)  Weight  Av.8 kg (173 lb 11.6 oz)  Min: 78.8 kg (173 lb 11.6 oz)  Max: 78.8 kg (173 lb 11.6 oz)  Body mass index is 24.93 kg/m².  I/O last 3 completed shifts:  In: 100 [IV Piggyback:100]  Out: 1070 [Urine:1070]    Physical Examination:  Physical Exam  Constitutional:       Appearance: He is ill-appearing.   HENT:      Head: Normocephalic and atraumatic.   Cardiovascular:      Rate and Rhythm: Normal rate and regular rhythm.      Pulses: Normal pulses.      Heart sounds: Normal heart sounds. No murmur heard.  Pulmonary:      Effort: Pulmonary effort is normal. No respiratory distress.      Breath sounds: Normal breath sounds. No stridor. No rhonchi or rales.   Abdominal:      General: Abdomen is flat.      Palpations: Abdomen is soft.      Tenderness: There is no abdominal tenderness.   Musculoskeletal:      Comments: L leg AKA. R foot bandaged, see media   Neurological:      Mental Status: He is alert. He is disoriented.         Laboratory:  Most Recent Data:  CBC:   Lab Results   Component Value Date    WBC 10.93 2024    HGB 9.3 (L) 2024    HCT 30.7 (L) 2024     2024    MCV 87 2024    RDW 18.7 (H) 2024     BMP:   Lab Results   Component Value Date     (H) 2024    K 3.2 (L) 2024     (H) 2024    CO2 23 2024    BUN 18 2024    CREATININE 1.1 2024     (H) 2024    CALCIUM 9.3 2024    MG 2.0 2024    PHOS 2.2 (L) 2024     LFTs:   Lab Results   Component Value Date    PROT 7.0 2024    ALBUMIN 1.7 (L) 2024    BILITOT 0.3 2024    AST 21 2024    ALKPHOS 78 2024    ALT 10 2024     Coags:   Lab Results   Component Value Date    INR 1.3 (H) 2024     Urinalysis:   Lab Results   Component Value Date    LABURIN (A) 2024     CANDIDA GLABRATA  10,000 - 49,999 cfu/ml  Treatment of asymptomatic candiduria is not recommended (except for   specific populations).  Candida isolated in the urine typically   represents colonization. If an indwelling urinary catheter is present  it should be removed or replaced.      COLORU Yellow 07/25/2024    CLARITYU Slightly Cloudy 04/22/2022    SPECGRAV 1.015 07/25/2024    NITRITE Negative 07/25/2024    KETONESU Negative 07/25/2024    UROBILINOGEN Negative 07/25/2024    WBCUA 44 (H) 07/25/2024       Trended Lab Data:  Recent Labs   Lab 07/25/24  1117 07/25/24  1554 07/25/24 2036 07/26/24  0309   WBC 9.92  --   --  10.93   HGB 8.8*  --   --  9.3*   HCT 28.4*  --   --  30.7*     --   --  374   MCV 85  --   --  87   RDW 18.5*  --   --  18.7*   *  --  148* 147*   K 2.5* 2.4* 3.0* 3.2*   *  --  115* 113*   CO2 24  --  25 23   BUN 18  --  18 18   CREATININE 1.2  --  1.1 1.1   *  --  107 116*   PROT 7.1  --   --  7.0   ALBUMIN 1.7*  --   --  1.7*   BILITOT 0.3  --   --  0.3   AST 18  --   --  21   ALKPHOS 74  --   --  78   ALT 12  --   --  10       Trended Cardiac Data:  Recent Labs   Lab 07/25/24  1117 07/25/24  1123 07/25/24  2115   TROPONINI 0.020  --  0.021   BNP  --  173*  --        Microbiology Data:  Receiving Vanc/Cefepime for MRSA/Pseudomonas osteomyelitis to R foot    Other Laboratory Data:  K+ 2.5 on admit, now 3.2    Other Results:  EKG (my interpretation):   Ventricular paced rhythm, no significant change from yesterday    Radiology:  Imaging Results              X-Ray Chest AP Portable (Final result)  Result time 07/25/24 22:20:40      Final result by Seymour Adkins MD (07/25/24 22:20:40)                   Impression:      Stable chest with no acute findings.      Electronically signed by: Seymour Adkins  Date:    07/25/2024  Time:    22:20               Narrative:    EXAMINATION:  XR CHEST AP PORTABLE    CLINICAL HISTORY:  AMS;    TECHNIQUE:  Single frontal view of the chest was performed.    COMPARISON:  07/25/2021    FINDINGS:  Heart is not enlarged the trachea is midline.  Hypoventilatory changes  with PICC line unchanged.                                       CT Head Without Contrast (Final result)  Result time 07/25/24 17:38:04      Final result by Hai Vargas MD (07/25/24 17:38:04)                   Impression:      No CT evidence of acute intracranial abnormality. Clinical correlation and further evaluation as warranted.    Chronic senescent and microvascular ischemic change.      Electronically signed by: Hai Vargas MD  Date:    07/25/2024  Time:    17:38               Narrative:    EXAMINATION:  CT HEAD WITHOUT CONTRAST    CLINICAL HISTORY:  encephalopathy;    TECHNIQUE:  Low dose axial images were obtained through the head.  Coronal and sagittal reformations were also performed. Contrast was not administered.    COMPARISON:  CT head 05/19/2024    FINDINGS:  There is generalized cerebral volume loss with compensatory sulcal widening and ventricular enlargement.  There is patchy periventricular white matter hypoattenuation suggesting sequelae of chronic microvascular ischemic change.  No CT evidence of acute intracranial hemorrhage or midline shift.  The basal cisterns are patent. The mastoid air cells and paranasal sinuses are clear of acute process. No acute displaced calvarial fracture identified.                                         Assessment and Plan:      Saji Castañeda is a 74 y.o.male with  Patient Active Problem List    Diagnosis Date Noted    Pressure injury of ankle, unstageable-lateral 06/28/2024    Pressure injury of right heel, stage 4 06/28/2024    Pressure injury of buttock, stage 4 06/28/2024    Wounds, multiple 06/26/2024    Altered mental status 05/22/2024    Palliative care encounter 05/21/2024    Pacemaker 04/02/2024    Urinary retention 03/20/2024    History of amputation of left high mid foot  03/07/2024    Long term current use of insulin 03/07/2024    Chronic anticoagulation 03/07/2024    Other hyperlipidemia 03/05/2024    Stage 3b chronic kidney disease  03/05/2024    Blurred vision, left eye 10/09/2023    Paroxysmal atrial fibrillation 10/02/2023    History of complete heart block 10/02/2023    Osteomyelitis of right lower extremity 03/10/2023    Hypergranulation 11/06/2022    Nonhealing ulcer of right lower leg with fat layer exposed 10/09/2022    Chronic deep vein thrombosis (DVT) of right upper extremity 07/06/2022    Hypokalemia 02/18/2021    Morbid obesity 02/18/2021    PVD (peripheral vascular disease) 02/03/2021    Peripheral arterial disease 12/10/2020    Asymptomatic Mobitz (type) I (Wenckebach's) atrioventricular block 12/06/2019    Anemia 10/15/2018    Cataract cortical, senile 06/13/2017    Type 2 diabetes mellitus, with long-term current use of insulin 09/23/2014    Type 2 diabetes mellitus with diabetic neuropathy, unspecified 08/05/2014    Essential hypertension 08/05/2014    Wound, open, foot with complication 02/19/2014    Tinea pedis 06/21/2013        ASSESSMENT  #Hypokalemia  2.5 on admit, with no significant EKG changes (from typical paced rhythm)  Possibly in setting of poor intake as well as medication induced: loop, thiazide diuretics and insulin as home meds  Normal urine K+ points to low intake or shift etiology    PLAN:  Continue to replete as needed  Monitor EKG  Review home meds and diet    #Hypophosphatemia  2.2 this AM  Possibly low intake or mild refeeding    PLAN:  Replete as needed  Monitor resp status    #AMS  Reported AMS and tremors at nursing home  Patient now with electrolyte derangements, UTI, medications (gabapentin, roxicodone, cefepime), neg head CT in ER     PLAN:  Review causes above    #UTI  In setting of chronic Sams  UA: 2+ PRO, 2+ BLD, 1+ OSMEL, >100 WBC while on Cefepime  With AMS    PLAN:  F/u speciation, treat as appropriate      Manny Pollard MD  [facility], PGY-3

## 2024-07-26 NOTE — PLAN OF CARE
TRIPP met with pt at bedside to discuss dc planning. Pt expressed his last name.  When asked other questions pt was unable to confirm and get his words out to communicate with tripp. TRIPP asked pt if she can call his sister Kandy and he shook his head yes. TRIPP made pts bedside nurse aware of encounter.     TRIPP contacted pts sister Kandy to discuss dc planning. Per sister pt resides at [facility] and will return upon dc. Pts sister Kandy is hard of hearing. Per sister pt has a good memory and communicates with her. TRIPP made pts sister aware of no dc today.     TRIPP contacted Bryce with [facility] to confirm all assessment questions. All information confirmed on chart. Pt verbally communicates at NH. Pts current status is not his baseline. Pt is an amputee. Pt gets around NH in wheelchair. Pt is not on O2 at nh per admission staff Bryce. Pt is assisted with bathing and dressing from NH staff. Upon dc pending pts medical status [facility] will transport pt home in there wheelchair van. Bryce reports that pt is a snf pt but is currently skilled. Bryce reports that pt will require auth before returning to NH.     Bryce with [facility] made aware of no dc today. Per Bryce pt will not be able to dc over weekend as he will need auth before return.     TRIPP will continue to follow pt throughout his transitions of care and assist with any dc needs.       Kandy Castañeda (Sister)  [phone]         07/26/24 0958   Discharge Assessment   Assessment Type Discharge Planning Assessment   Confirmed/corrected address, phone number and insurance Yes   Confirmed Demographics Correct on Facesheet   Source of Information facility verbal report   Communicated OXANA with patient/caregiver Yes   Reason For Admission Altered mental status   People in Home facility resident   Do you expect to return to your current living situation? Yes   Do you have help at home or someone to help you manage your care at home? Yes   Who are your caregiver(s)  and their phone number(s)? Kandy Castañeda (Sister)  [phone]   Prior to hospitilization cognitive status: Unable to Assess   Current cognitive status: Unable to Assess   Dressing/Bathing Difficulty yes   Dressing/Bathing bathing difficulty, assistance 1 person   Home Accessibility wheelchair accessible   Home Layout Able to live on 1st floor   Equipment Currently Used at Home wheelchair   Readmission within 30 days? No   Patient currently being followed by outpatient case management? No   Do you currently have service(s) that help you manage your care at home? No   Do you take prescription medications? Yes   Do you have prescription coverage? Yes   Coverage [facility] MANAGED MEDICARE - [employer] [facility] [place] HMO PPO SPECIAL NEEDS   Do you have any problems affording any of your prescribed medications? No   Is the patient taking medications as prescribed? yes   Who is going to help you get home at discharge? [facility] wheelchair van will provide transport to NH   How do you get to doctors appointments? agency   Are you on dialysis? No   Do you take coumadin? No   Discharge Plan A Return to nursing home   DME Needed Upon Discharge  none   Discharge Plan discussed with: Sibling   Name(s) and Number(s) Kandy Castañeda (Sister)  [phone] (   Transition of Care Barriers None   Alcohol Use   Q1: How often do you have a drink containing alcohol? Never   Q2: How many drinks containing alcohol do you have on a typical day when you are drinking? None   Q3: How often do you have six or more drinks on one occasion? Never   Utilities   In the past 12 months has the electric, gas, oil, or water company threatened to shut off services in your home? No   Health Literacy   How often do you need to have someone help you when you read instructions, pamphlets, or other written material from your doctor or pharmacy? Often   OTHER   Name(s) of People in Home Pt resides at  [facility]

## 2024-07-26 NOTE — PT/OT/SLP EVAL
Speech Language Pathology Evaluation  Bedside Swallow    Patient Name:  Saji Castañeda   MRN:  [id number]  Admitting Diagnosis: Altered mental status    Recommendations:               Diet recommendations:  Minced & Moist Diet - IDDSI Level 5, Thin liquids - IDDSI Level 0   Aspiration Precautions: upright for meals, slow rate, alternate sips and bites, crush meds, assist with feeding for meals    General Precautions: Standard, fall  Communication strategies:  prior exp aphasia     Assessment:     Saji Castañeda is a 74 y.o. male admitted with AMS who presents with necessity for diet modification at this time and assist with feeding.      Per MD note:        History of Present Illness:  Saji Castañeda is a 74 y.o. Male with a PMH of T2DM on long-term insulin, PVD with diabetic ulcer s/p left AKA (3/27/2024), Chronic Multifactorial Anemia, BPH s/p Chronic Sams, paroxysmal A-fib on eliquis, CHB s/p PPM (10/12/2023), CKD3, Multifactorial HTN, and HLD Osteomyelitis of R foot who presented to ED per nursing home for decreased mental status for 1 day.      The patient was in their usual state of health until this morning when nursing home staff attempted to give him his medications. However, staff noticed that he started to have tremors and had complications speaking his name. Per nursing staff, states patient is normally oriented x3 and very conversational, able to feed himself and use his phone etc. These findings are new which warranted them to call EMS to bring him to the hospital.      During our interview, patient remained tremulous and still presented with expressive aphasia. Patient able to answer simple questions, but is unable to speak in sentences. Denies any chest pain or shortness of breath. Patient actively scratching himself during this interview with multiple excoriations across his chest and back. Per nursing home, patient normally scratches himself like this. Patient has chronic stage for sacral and right  foot ulcers. Patient recently admitted to hospital and discharged on 7/11/2024 for chest pain and shortness of breath. Was found to be anemic to 6.3 and received blood transfusions. During this admission as well, he had a bone culture of right foot positive for pseudomonas aeruginosa and MRSA. Patient was started on vancomycin and cefepime on discharge with end date of 8/12. Patient has been given his medications since at his nursing facility and remained at baseline until this morning.     In ED, patient was afebrile with /60. HR in 80s. Actively tremulous presenting with expressive aphasia. Labs remarkable for hgb 8.8 (at baseline), Na 146, K 2.5, Cl 112, Ua with WBC 44, 1+ LE but patient has chronic fung catheter in place. CT Head unremarkable for acute abnormalities, but has chronic microvascular ischemic changes. EKG with wide QRS rhythm at rate 87. LBBB. no acute ST segment or T wave changes. Patient admitted to inpatient medicine for correction of electrolytes and work up for acute neurologic changes.       Past Medical History:   Diagnosis Date    Arthritis     legs    Bacteremia due to Gram-negative bacteria 3/23/2021    Diabetes mellitus     Diabetes mellitus, type 2     Hyperlipidemia     Hypertension     Osteomyelitis     Palliative care encounter 5/24/2023       Past Surgical History:   Procedure Laterality Date    ABOVE-KNEE AMPUTATION Left 3/27/2024    Procedure: AMPUTATION, ABOVE KNEE;  Surgeon: Adal Arellano MD;  Location: [facility] OR [address] [place] [address];  Service: Vascular;  Laterality: Left;    ANGIOGRAPHY OF LOWER EXTREMITY N/A 2/3/2021    Procedure: Angiogram Extremity Bilateral;  Surgeon: Ernst Chacko MD;  Location: [facility] OR [address] [place] [address];  Service: Peripheral Vascular;  Laterality: N/A;  7.4 mintues fluoroscopy time  816.15 mGy  170.17 Gycm2    AORTOGRAPHY WITH EXTREMITY RUNOFF Bilateral 2/3/2021    Procedure: AORTOGRAM, WITH EXTREMITY RUNOFF;  Surgeon: Ernst Chacko MD;   "Location: [facility] OR 2ND FLR;  Service: Peripheral Vascular;  Laterality: Bilateral;    DEBRIDEMENT OF FOOT Left 2/23/2021    Procedure: DEBRIDEMENT, LEFT HEEL;  Surgeon: Mayra Schroeder DPM;  Location: [facility] OR 1ST FLR;  Service: Podiatry;  Laterality: Left;    ESOPHAGOGASTRODUODENOSCOPY N/A 6/28/2024    Procedure: EGD (ESOPHAGOGASTRODUODENOSCOPY);  Surgeon: Adal Chandra MD;  Location: [facility] ENDO;  Service: Gastroenterology;  Laterality: N/A;    FOOT AMPUTATION  October 2010    left high midfoot amputation    IMPLANTATION OF LEADLESS PACEMAKER N/A 10/12/2023    Procedure: [id number];  Surgeon: VANESSA Delatorre MD;  Location: [facility] EP LAB;  Service: Cardiology;  Laterality: N/A;  AVB, MICRA, EH, [place] [address]       Social History: Patient lives at [facility] home.     Chest X-Rays: Stable chest with no acute findings.     CT of the head: No CT evidence of acute intracranial abnormality. Clinical correlation and further evaluation as warranted.     Prior diet: unknown.    Subjective     Pt found in room, semi-recliner. Pt did protest when HOB was elevated.   Patient goals: "they call me Saji."     Pain/Comfort:  Pain Rating 1: 6/10  Location 1:  (back)  Pain Addressed 1: Reposition    Respiratory Status: room air     Objective:   Pt found in room, SLP introduced self. Pt with some delays when communicating, hx of exp aphasia.   Pt is slow to follow basic oral motor commands.   He is oriented to self and aware this is not where he lives.     Oral Musculature Evaluation  Oral Musculature: general weakness  Dentition: scattered dentition, teeth in poor condition  Secretion Management: oral holding  Mucosal Quality: dry  Oral Labial Strength and Mobility:  (fair)  Lingual Strength and Mobility:  (fair)  Buccal Strength and Mobility: decreased tone  Volitional Cough: elicited  Volitional Swallow: slight delay in swallow palpated  Voice Prior to PO Intake: clear voice, low volume    Bedside " Swallow Eval:   Consistencies Assessed:  Thin liquids water by tsp, cup and straw  Puree whole pudding cup   Soft solids diced peaches   Solids pieces of cracker      Oral Phase:   Excess chewing  Oral residue  Slow oral transit time  Needs liquid rinse to clear mild R side pocketing - does clear it     Pharyngeal Phase:   delayed swallow initation  no overt clinical signs/symptoms of aspiration  multiple spontaneous swallows    Compensatory Strategies  Multiple swallows   Alternate sips and bites  Upright for meals  Single straw sips    Treatment: SLP provided patient education on SLP recommendations, SLP role, s/s and risks of aspiration, safe swallow precautions, and POC. The patient nodded to info provided but no return of info was given to SLP.   No family at bedside to discuss results.   Secure chat sent to primary MD team and RN with above diet recommendations. Diet to be adjusted in epic.        Goals:   Multidisciplinary Problems       SLP Goals       Not on file                    Plan:     Patient to be seen:      Plan of Care expires:     Plan of Care reviewed with:  patient   SLP Follow-Up:  No       Discharge recommendations:   (return to [facility] home)   Barriers to Discharge:  none    Time Tracking:     SLP Treatment Date:   07/26/24  Speech Start Time:  0932  Speech Stop Time:  1005     Speech Total Time (min):  33 min    Billable Minutes: Eval Swallow and Oral Function 21 and Self Care/Home Management Training 12    07/26/2024

## 2024-07-26 NOTE — HPI
Saji Castañeda is a 74 y.o. Male with a PMH of T2DM on long-term insulin, PVD with diabetic ulcer s/p left AKA (3/27/2024), Chronic Multifactorial Anemia, BPH s/p Chronic Sams, paroxysmal A-fib on eliquis, CHB s/p PPM (10/12/2023), CKD3, Multifactorial HTN and HLD. chronic Osteomyelitis of R foot who presented to ED per nursing home for decreased mental status for 1 day (7/26/2024). Patient was stabilized and is prepared to discharge today 7/26/2024.    Podiatry consulted for R calcaneus osteomyelitis and heel wound. R foot dressing from nursing home is clean, dry, and intact. Pt previously denied surgical intervention and today is nonverbal. ALBERTO MACEDO, pt unable to be assessed verbally.

## 2024-07-26 NOTE — PLAN OF CARE
Swallow eval completed, pt with premorbid cognitive-linguistic deficits. Pt able to voice his name and head nods to ?s. Pt may initiate mechanical soft and THIN liquid diet for now, crushed oral meds. Pt needs assistance for feeding.

## 2024-07-26 NOTE — CONSULTS
[facility] Infectious Diseases Consult Note    Primary Attending Physician: Vinod Elise MD     Reason for Consult:     Altered mental status possibly from cefepime.  Patient with heel osteomyelitis with MRSA and Pseudomonas aeruginosa    Assessment (see problem list below):     Saji Castañeda is a 74 y.o. male with:  Altered mental status in a patient with cefepime.  Patient with paucity of speech as well as with myoclonic jerking  Heel osteomyelitis with cultures from prior admission with MRSA and carbapenem resistant Pseudomonas aeruginosa.  Organism is susceptible to piperacillin tazobactam as well as Zerbaxa.  Diabetes, hypertension, hyperlipidemia, peripheral artery disease, left AKA, right heel osteomyelitis, cefepime cutis, pacemaker, chronic Sams with multiple infections      Plan:     Continue vancomycin  Discontinue cefepime for now.  Would wait to start beta-lactam antibiotic until symptoms have improved   Once neurologic status is improved, could start piperacillin tazobactam to cover Pseudomonas aeruginosa  Vascular studies of right lower extremities to make sure that there is adequate vascularity to heal  Sacral and heel wound care  MRI brain and perhaps EEG  Monitor patient progress    Thank you for allowing us to participate in the care of this patient. Please contact me if you have any questions regarding this consult.    Sameer CARRASCO Irwin  [employer] ID  Pager: [phone]    Subjective:      History of Present Illness:  Saji Castañeda is a 74 y.o. male with diabetes, hypertension, hyperlipidemia, peripheral artery disease, left AKA, right heel osteomyelitis, pacemaker, chronic Sams with multiple infections presents with mental status changes for 1 day.  Patient known to our service from prior admissions.  Please see consult note from Dr. Arita from 06/30/2024.  Patient also seen by me on prior admission.  See my note from 05/10/2024 for information prior to that admission.    06/26/2024 patient  transferred from nursing home with worsening right heel wound consistent with osteomyelitis.  Patient was empirically started on vancomycin piperacillin tazobactam.  Was changed to vancomycin and cefepime for bone biopsy that was positive for MRSA and carbapenem resistant Pseudomonas aeruginosa.  Patient was discharged on 07/11/2024    Patient followed up with Podiatry.    07/25/2024 patient was transferred from nursing home because of some confusion and becoming nonverbal.  Upon evaluation, the patient was afebrile.  WBC 9.92.  Head CT without any acute abnormalities Was treated with vancomycin and cefepime.  Blood cultures and urine cultures in process.  Patient was noted to have sacral decubitus.  Patient continues to have expressive aphasia    Infectious disease was consulted for altered mental status on patient with cefepime.  Right leg osteomyelitis with MRSA and Pseudomonas aeruginosa    Past Medical History:  Past Medical History:   Diagnosis Date    Arthritis     legs    Bacteremia due to Gram-negative bacteria 3/23/2021    Diabetes mellitus     Diabetes mellitus, type 2     Hyperlipidemia     Hypertension     Osteomyelitis     Palliative care encounter 5/24/2023       Past Surgical History:  Past Surgical History:   Procedure Laterality Date    ABOVE-KNEE AMPUTATION Left 3/27/2024    Procedure: AMPUTATION, ABOVE KNEE;  Surgeon: Adal Arellano MD;  Location: [address];  Service: Vascular;  Laterality: Left;    ANGIOGRAPHY OF LOWER EXTREMITY N/A 2/3/2021    Procedure: Angiogram Extremity Bilateral;  Surgeon: Ernst Chacko MD;  Location: [address];  Service: Peripheral Vascular;  Laterality: N/A;  7.4 mintues fluoroscopy time  816.15 mGy  170.17 Gycm2    AORTOGRAPHY WITH EXTREMITY RUNOFF Bilateral 2/3/2021    Procedure: AORTOGRAM, WITH EXTREMITY RUNOFF;  Surgeon: Ernst Chacko MD;  Location: [address];  Service: Peripheral Vascular;  Laterality: Bilateral;     "DEBRIDEMENT OF FOOT Left 2021    Procedure: DEBRIDEMENT, LEFT HEEL;  Surgeon: Mayra Schroeder DPM;  Location: [facility] OR [address] [place] [address];  Service: Podiatry;  Laterality: Left;    ESOPHAGOGASTRODUODENOSCOPY N/A 2024    Procedure: EGD (ESOPHAGOGASTRODUODENOSCOPY);  Surgeon: Adal Chandra MD;  Location: [facility];  Service: Gastroenterology;  Laterality: N/A;    FOOT AMPUTATION  2010    left high midfoot amputation    IMPLANTATION OF LEADLESS PACEMAKER N/A 10/12/2023    Procedure: [id number];  Surgeon: VANESSA Delatorre MD;  Location: [facility] EP LAB;  Service: Cardiology;  Laterality: N/A;  AVB, MICRA, EH, [place], [place] [address]       Allergies:  Review of patient's allergies indicates:  No Known Allergies    Medications:  Reviewed  Antibiotics  Vancomycin  Cefepime  Family History:  Family History   Problem Relation Name Age of Onset    Diabetes Mother      Heart disease Mother      Stroke Sister      Cancer Brother      Diabetes Sister         Social History:  Social History     Tobacco Use    Smoking status: Never    Smokeless tobacco: Never   Substance Use Topics    Alcohol use: No     Comment: occassional    Drug use: No     Review of Systems   Unable to perform ROS: Patient nonverbal       Objective:   Last 24 Hour Vital Signs:  BP  Min: 139/71  Max: 180/72  Temp  Av °F (36.7 °C)  Min: 97.6 °F (36.4 °C)  Max: 98.3 °F (36.8 °C)  Pulse  Av.8  Min: 82  Max: 95  Resp  Av.4  Min: 18  Max: 22  SpO2  Av.3 %  Min: 97 %  Max: 100 %  Height  Av' 10" (177.8 cm)  Min: 5' 10" (177.8 cm)  Max: 5' 10" (177.8 cm)  Weight  Av.8 kg (173 lb 11.6 oz)  Min: 78.8 kg (173 lb 11.6 oz)  Max: 78.8 kg (173 lb 11.6 oz)  I/O last 3 completed shifts:  In: 100 [IV Piggyback:100]  Out: 1070 [Urine:1070]    Physical Exam  Vitals and nursing note reviewed.   HENT:      Head: Normocephalic and atraumatic.      Nose: Nose normal. No congestion or rhinorrhea.      Mouth/Throat:      " Mouth: Mucous membranes are moist.      Pharynx: Oropharynx is clear. No oropharyngeal exudate.   Eyes:      Conjunctiva/sclera: Conjunctivae normal.      Pupils: Pupils are equal, round, and reactive to light.   Cardiovascular:      Rate and Rhythm: Normal rate.      Heart sounds: No murmur heard.     No friction rub. No gallop.      Comments: Upper extremity pulses intact.  Pulmonary:      Breath sounds: Normal breath sounds. No rales.   Abdominal:      General: There is no distension.      Palpations: Abdomen is soft.      Tenderness: There is no abdominal tenderness. There is no guarding.   Genitourinary:     Comments: Sams  Musculoskeletal:      Cervical back: Neck supple.      Right lower leg: Edema present.      Comments: Right foot wrapped.  Left AKA.  Upper extremities without acute lesions.  PICC line in place   Lymphadenopathy:      Cervical: No cervical adenopathy.   Skin:     Findings: Lesion present.   Neurological:      Mental Status: He is alert.      Comments: Patient difficulty speaking.  Myoclonic jerking.  Sensation appears to be intact.  Somewhat agitated because of the situation         Devices:  PICC line   Sams     Laboratory Results: reviewed  Most Recent Data:  CBC:   Lab Results   Component Value Date    WBC 10.93 07/26/2024    HGB 9.3 (L) 07/26/2024    HCT 30.7 (L) 07/26/2024     07/26/2024    MCV 87 07/26/2024    RDW 18.7 (H) 07/26/2024     BMP:   Lab Results   Component Value Date     (H) 07/26/2024    K 3.2 (L) 07/26/2024     (H) 07/26/2024    CO2 23 07/26/2024    BUN 18 07/26/2024     (H) 07/26/2024    CALCIUM 9.3 07/26/2024    MG 2.0 07/26/2024    PHOS 2.2 (L) 07/26/2024     LFTs:   Lab Results   Component Value Date    PROT 7.0 07/26/2024    ALBUMIN 1.7 (L) 07/26/2024    BILITOT 0.3 07/26/2024    AST 21 07/26/2024    ALKPHOS 78 07/26/2024    ALT 10 07/26/2024     Urinalysis:   Lab Results   Component Value Date    LABURIN (A) 05/19/2024     CANDIDA  GLABRATA  10,000 - 49,999 cfu/ml  Treatment of asymptomatic candiduria is not recommended (except for   specific populations). Candida isolated in the urine typically   represents colonization. If an indwelling urinary catheter is present  it should be removed or replaced.      COLORU Yellow 07/25/2024    CLARITYU Slightly Cloudy 04/22/2022    SPECGRAV 1.015 07/25/2024    NITRITE Negative 07/25/2024    KETONESU Negative 07/25/2024    UROBILINOGEN Negative 07/25/2024       Trended Lab Data:  Recent Labs   Lab 07/25/24  1117 07/25/24  1554 07/25/24 2036 07/26/24  0309   WBC 9.92  --   --  10.93   HGB 8.8*  --   --  9.3*   HCT 28.4*  --   --  30.7*     --   --  374   MCV 85  --   --  87   RDW 18.5*  --   --  18.7*   *  --  148* 147*   K 2.5* 2.4* 3.0* 3.2*   *  --  115* 113*   CO2 24  --  25 23   BUN 18  --  18 18   *  --  107 116*   PROT 7.1  --   --  7.0   ALBUMIN 1.7*  --   --  1.7*   BILITOT 0.3  --   --  0.3   AST 18  --   --  21   ALKPHOS 74  --   --  78   ALT 12  --   --  10       Microbiology Data:  Right foot bone culture 07/01/2024 MRSA carbapenem resistant Pseudomonas aeruginosa  Blood cultures 07/25/2024 in process  Urine culture in process    Other Results:    Radiology:  Reviewed  Chest x-ray without acute changes  Head CT without changes    Problem List  Patient Active Problem List   Diagnosis    Tinea pedis    Wound, open, foot with complication    Type 2 diabetes mellitus with diabetic neuropathy, unspecified    Essential hypertension    Type 2 diabetes mellitus, with long-term current use of insulin    Cataract cortical, senile    Anemia    Asymptomatic Mobitz (type) I (Wenckebach's) atrioventricular block    Peripheral arterial disease    PVD (peripheral vascular disease)    Hypokalemia    Morbid obesity    Chronic deep vein thrombosis (DVT) of right upper extremity    Nonhealing ulcer of right lower leg with fat layer exposed    Hypergranulation    Osteomyelitis of right  lower extremity    Paroxysmal atrial fibrillation    History of complete heart block    Blurred vision, left eye    Other hyperlipidemia    Stage 3b chronic kidney disease    History of amputation of left high mid foot     Long term current use of insulin    Chronic anticoagulation    Urinary retention    Pacemaker    Palliative care encounter    Altered mental status    Wounds, multiple    Pressure injury of ankle, unstageable-lateral    Pressure injury of right heel, stage 4    Pressure injury of buttock, stage 4     See above for impression and recommendations.

## 2024-07-26 NOTE — PLAN OF CARE
Problem: Adult Inpatient Plan of Care  Goal: Plan of Care Review  7/26/2024 0518 by Karol Walker, RN  Outcome: Progressing  7/26/2024 0354 by Karol Walker, RN  Outcome: Progressing  7/26/2024 0354 by Karol Walker, RN  Outcome: Progressing  Goal: Patient-Specific Goal (Individualized)  7/26/2024 0518 by Karol Walker, RN  Outcome: Progressing  7/26/2024 0354 by Karol Walker, RN  Outcome: Progressing  7/26/2024 0354 by Karol Walker, RN  Outcome: Progressing  Goal: Absence of Hospital-Acquired Illness or Injury  7/26/2024 0518 by Karol Walker, RN  Outcome: Progressing  7/26/2024 0354 by Karol Walker, RN  Outcome: Progressing  7/26/2024 0354 by Karol Walker, RN  Outcome: Progressing  Goal: Optimal Comfort and Wellbeing  7/26/2024 0518 by Karol Walker, RN  Outcome: Progressing  7/26/2024 0354 by Karol Walker, RN  Outcome: Progressing  7/26/2024 0354 by Karol Walker, RN  Outcome: Progressing  Goal: Readiness for Transition of Care  7/26/2024 0518 by Karol Walker, RN  Outcome: Progressing  7/26/2024 0354 by Karol Walker, RN  Outcome: Progressing  7/26/2024 0354 by Karol Walker, RN  Outcome: Progressing

## 2024-07-26 NOTE — ED NOTES
Called 4 B for report and spoke with Charge nurse Staci. Reports bed yet to be assigned. Discussed with JUDIE Shwa, ZAYRA and House supervisor.

## 2024-07-26 NOTE — PROGRESS NOTES
Pharmacokinetic Initial Assessment: IV Vancomycin    Assessment/Plan:    Initiate intravenous vancomycin with loading dose of 2000 mg once followed by a maintenance dose of vancomycin 1500 mg IV every 24 hours  Desired empiric serum trough concentration is 15 to 20 mcg/mL  Draw vancomycin trough level 60 min prior to third dose on 07/28/24 at approximately 0030  Pharmacy will continue to follow and monitor vancomycin.      Please contact pharmacy at extension [phone] with any questions regarding this assessment.     Thank you for the consult,   Carlyn Baumann       Patient brief summary:  Saji Castañeda is a 74 y.o. male initiated on antimicrobial therapy with IV Vancomycin for treatment of suspected bone/joint infection    Drug Allergies:   Review of patient's allergies indicates:  No Known Allergies    Actual Body Weight:   78.8 kg    Renal Function:   Estimated Creatinine Clearance: 59.9 mL/min (based on SCr of 1.1 mg/dL).,     Dialysis Method (if applicable):  N/A    CBC (last 72 hours):  Recent Labs   Lab Result Units 07/25/24  1117 07/25/24  2115   WBC K/uL 9.92  --    Hemoglobin g/dL 8.8*  --    Hemoglobin A1C %  --  6.0*   Hematocrit % 28.4*  --    Platelets K/uL 373  --    Gran % % 65.6  --    Lymph % % 13.1*  --    Mono % % 6.8  --    Eosinophil % % 13.6*  --    Basophil % % 0.6  --    Differential Method  Automated  --        Metabolic Panel (last 72 hours):  Recent Labs   Lab Result Units 07/25/24  1117 07/25/24  1257 07/25/24  1554 07/25/24  1716 07/25/24  2036 07/25/24  2115   Sodium mmol/L 146*  --   --   --  148*  --    Potassium mmol/L 2.5*  --  2.4*  --  3.0*  --    Chloride mmol/L 112*  --   --   --  115*  --    CO2 mmol/L 24  --   --   --  25  --    Glucose mg/dL 125*  --   --   --  107  --    Glucose, UA   --  Negative  --  Negative  --   --    BUN mg/dL 18  --   --   --  18  --    Creatinine mg/dL 1.2  --   --   --  1.1  --    Albumin g/dL 1.7*  --   --   --   --   --    Total Bilirubin mg/dL  "0.3  --   --   --   --   --    Alkaline Phosphatase U/L 74  --   --   --   --   --    AST U/L 18  --   --   --   --   --    ALT U/L 12  --   --   --   --   --    Magnesium mg/dL 1.8  --   --   --   --   --    Phosphorus mg/dL  --   --   --   --   --  2.1*       Drug levels (last 3 results):  No results for input(s): "VANCOMYCINRA", "VANCORANDOM", "VANCOMYCINPE", "VANCOPEAK", "VANCOMYCINTR", "VANCOTROUGH" in the last 72 hours.    Microbiologic Results:  Microbiology Results (last 7 days)       Procedure Component Value Units Date/Time    Blood culture [[id number]] Collected: 07/25/24 2115    Order Status: Sent Specimen: Blood     Blood culture [[id number]] Collected: 07/25/24 2114    Order Status: Sent Specimen: Blood from Peripheral, Hand, Left     Urine culture [[id number]] Collected: 07/25/24 1257    Order Status: No result Specimen: Urine from Clean Catch Updated: 07/25/24 1439    Urine culture [[id number]]     Order Status: Completed Specimen: Urine             "

## 2024-07-26 NOTE — DISCHARGE SUMMARY
[facility] Hospital Medicine Discharge Summary    Primary Team: [facility] Hospitalist Team B  Attending Physician: Vinod Eilse MD  Resident: Luis Escobedo MD  Intern: Blake Naranjo MD    Date of Admit: 7/25/2024  Date of Discharge: 7/29/2024    Transfer to: [facility]  Condition: Stable    Discharge Diagnoses     Acute encephalopathy  Paroxysmal Atrial fibrillation  Complete heart block s/p pacemaker   HTN  T2DM  Anemia of Chronic Disease  Osteomyelitis Right foot  Stage 4 ulcer to right foot  Stage 4 ulcer to sacrum  Left above knee amputation  BPH    Consultants and Procedures     Consultants:  Neurology, Wound care, SLP    Procedures:   None    Imaging:  CT head  Xray Chest  TTE    Brief History of Present Illness      Saji Castañeda is a 74 y.o. Male with a PMH of T2DM on long-term insulin, PVD with diabetic ulcer s/p left AKA (3/27/2024), Chronic Multifactorial Anemia, BPH s/p Chronic Sams, paroxysmal A-fib on eliquis, CHB s/p PPM (10/12/2023), CKD3, Multifactorial HTN, and HLD Osteomyelitis of R foot who presented to ED per nursing home for decreased mental status for 1 day. Per nursing home staff, patient usually oriented x4 and is conversational, able to feed himself on his own. However, now presenting with expressive aphasia as well as tremors to bilateral upper extremities. Labs remarkable for potassium of 2.5, Na 146, Cl 112, , Ua with 3+ Leukocyte Esterase and 44 WBC. CT Head with no acute abnormalities. EKG with new LBBB and QT prolongation. However, per cardiology, LBBB can be seen with pacemaker ventricular pacing. Nephrology consulted for electrolyte replacement recs. Low potassium likely due to home lasix and insulin in setting of normal urine potassium likely due to low potassium intake or shift etiology. Mental status still not improving with correction of electrolytes. Consulted Neurology, recommending MRI brain w and w/o contrast, MRI C spine. However, patient must be transferred to [facility] for MRI due to having permanent pacemaker.     For the full HPI please refer to the History & Physical from this admission.    Hospital Course By Problem with Pertinent Findings     #Expressive aphasia  #Acute Encephalopathy  - patient is a/o x4 at baseline, but now presenting with aphasia and bilateral upper extremity tremors - tremors resolving  - CT Head with no acute abnormalities  - mental status still not improving with correction of electrolytes  - Neurology consulted: recommend MRI brain and c-spine  Plan  - Pending transport to [facility] for MRI.  - Could be related to outpatient antibiotic regimen, Cefepime discontinued 7/26  - Appreciate Neurology recs     #Electrolyte imbalances, resolved  - potassium 2.5, phos 2.1, and sodium 146 upon admission  - Electrolytes repleted  - Nephrology consulted, appreciate recs  Plan  - monitor with daily CMP  - replace electrolytes PRN  - Restarting home Lasix PO 20 daily     #Osteomyelitis of right lower extremity  - Heel osteomyelitis with MRSA and pseudomonas, diagnosed last month  - Pt discharged on Vancomycin and Cefepime  - US LE R arteries (7/27): CHRISTIAN is patent, moderate proximal focal stenosis - adequate blood flow for healing  Plan  - ID consulted; discontinuing Cefepime and will let it wash out - could be contributing to change in mental status  - Plan to initiate Zosyn once neurologic status improved and will continue with Vancomycin and Zosyn to complete abx course  - Wound care and Podiatry consulted, appreciate recs     #Paroxysmal Afib  #Complete Heart block s/p Pacemaker  - EKG with wide complex QRS, new LBBB which can be seen in patients with pacemaker that is ventricular paced, repeat EKG (7/26): wide complex QRS no longer present, ventricular-paced rhythm  - on home Eliquis 5 mg BID  - TTE (7/26): EF 55-60%, concentric hypertrophy, septal motion consistent with bundle branch block  Plan  - Continue home Apixaban      #Essential  "Hypertension  - /70s on admission  - home medications include nifedipine XL 90 mg  Plan  - Daily Amlodipine 5 (Nifedipine cannot be crushed), can adjust dose as needed     #Insulin dependent Type 2 Diabetes Mellitus  - repeat A1c 6 (6.8 one month ago)  - home medications include Lantus 12u BID/Novolog 5u with meals  Plan  - On Lantus 6u  - on SSI  - Blood glucose goal: 140 - 180, has been at goal or close to goal during admission     #Anemia of chronic disease  - hgb 8.8 upon admission, improving, at baseline  - Iron studies 6/27/24  Fe 46  Transferrin 72  TIBC 107  Sat 43  Ferritin 697  Plan  - Transfuse if <7  - Daily CBCs     #BPH  #Chronic Indwelling Fung  - on tamsulosin 0.4 mg  - Ua with 1+ LE and 44 WBC. Chronically infected  - no need to treat with abx due to chronic fung. Likely colonization     Discharge Medications        Medication List        CONTINUE taking these medications      acetaminophen 325 MG tablet  Commonly known as: TYLENOL     apixaban 5 mg Tab  Commonly known as: ELIQUIS  Take 1 tablet (5 mg total) by mouth 2 (two) times daily.     ascorbic acid (vitamin C) 500 MG tablet  Commonly known as: VITAMIN C     BD AUTOSHIELD DUO PEN NEEDLE 30 gauge x 3/16" Ndle  Generic drug: pen needle,diabetic dual safty     BD ULTRA-FINE ADITYA PEN NEEDLE 32 gauge x 5/32" Ndle  Generic drug: pen needle, diabetic  USE FOUR TIMES DAILY WITH MEALS AND NIGHTLY     blood sugar diagnostic Strp  Commonly known as: CONTOUR TEST STRIPS  1 strip by Misc.(Non-Drug; Combo Route) route 4 (four) times daily. Use to check blood glucose 4x daily     D5W SolP 250 mL with vancomycin 1.25 gram SolR 1,250 mg  Inject 1,250 mg into the vein Daily.     ergocalciferol 50,000 unit Cap  Commonly known as: ERGOCALCIFEROL  Take 1 capsule (50,000 Units total) by mouth Every Friday. for 10 doses  Start taking on: August 2, 2024     ferrous sulfate 325 (65 FE) MG EC tablet     FLOMAX 0.4 mg Cap  Generic drug: tamsulosin     furosemide " 20 MG tablet  Commonly known as: LASIX  Take 1 tablet (20 mg total) by mouth once daily.     gabapentin 300 MG capsule  Commonly known as: NEURONTIN     * insulin aspart U-100 100 unit/mL injection  Commonly known as: NovoLOG     * insulin aspart U-100 100 unit/mL (3 mL) Inpn pen  Commonly known as: NovoLOG  Inject 5 Units into the skin 3 (three) times daily with meals.     lancets Misc  Commonly known as: MICROLET LANCET  1 each by Misc.(Non-Drug; Combo Route) route 4 (four) times daily. Use to check blood sugars 4x daily     LANTUS SOLOSTAR U-100 INSULIN 100 unit/mL (3 mL) Inpn pen  Generic drug: insulin glargine U-100 (Lantus)     menthol-zinc oxide 0.44-20.6 % Oint  Commonly known as: CALMOSEPTINE     multivitamin per tablet  Commonly known as: THERAGRAN     NIFEdipine 90 MG (OSM) 24 hr tablet  Commonly known as: PROCARDIA-XL  Take 1 tablet (90 mg total) by mouth once daily.     oxyCODONE 5 MG immediate release tablet  Commonly known as: ROXICODONE  Take 1 tablet (5 mg total) by mouth every 6 (six) hours as needed for Pain.     pantoprazole 40 MG tablet  Commonly known as: PROTONIX     potassium chloride SA 20 MEQ tablet  Commonly known as: K-DUR,KLOR-CON     povidone-iodine 10 % external solution  Commonly known as: BETADINE     SantyL ointment  Generic drug: collagenase     TRUE METRIX GLUCOSE METER Misc  Generic drug: blood-glucose meter     zinc sulfate 50 mg zinc (220 mg) capsule  Commonly known as: ZINCATE           * This list has 2 medication(s) that are the same as other medications prescribed for you. Read the directions carefully, and ask your doctor or other care provider to review them with you.                STOP taking these medications      acidophilus-pectin, citrus 100 million cell-10 mg Cap     D5W PgBk 100 mL with ceFEPIme 2 gram SolR 2 g     HARITHA 7-7-1.5 gram Pwpk  Generic drug: arginine-glutamine-calcium HMB               Where to Get Your Medications        Information about where to get  these medications is not yet available    Ask your nurse or doctor about these medications  ergocalciferol 50,000 unit Cap         Discharge Information:     Diet:  Regular    Physical Activity:  As tolerated             Instructions:  1. Take all medications as prescribed  2. Keep all follow-up appointments  3. Return to the hospital or call your primary care physicians if any worsening symptoms such as fever, chest pain, shortness of breath, return of symptoms, or any other concerns.    Follow-Up Appointments:  none    Blake Naranjo MD  [facility] Internal Medicine HO-I  [facility] Internal Medicine Team B

## 2024-07-27 LAB
ALBUMIN SERPL BCP-MCNC: 1.9 G/DL (ref 3.5–5.2)
ALP SERPL-CCNC: 89 U/L (ref 55–135)
ALT SERPL W/O P-5'-P-CCNC: 9 U/L (ref 10–44)
ANION GAP SERPL CALC-SCNC: 10 MMOL/L (ref 8–16)
ANION GAP SERPL CALC-SCNC: 12 MMOL/L (ref 8–16)
AST SERPL-CCNC: 16 U/L (ref 10–40)
BASOPHILS # BLD AUTO: 0.03 K/UL (ref 0–0.2)
BASOPHILS NFR BLD: 0.2 % (ref 0–1.9)
BILIRUB SERPL-MCNC: 0.4 MG/DL (ref 0.1–1)
BUN SERPL-MCNC: 17 MG/DL (ref 8–23)
BUN SERPL-MCNC: 17 MG/DL (ref 8–23)
CALCIUM SERPL-MCNC: 8.9 MG/DL (ref 8.7–10.5)
CALCIUM SERPL-MCNC: 9.5 MG/DL (ref 8.7–10.5)
CHLORIDE SERPL-SCNC: 109 MMOL/L (ref 95–110)
CHLORIDE SERPL-SCNC: 110 MMOL/L (ref 95–110)
CO2 SERPL-SCNC: 23 MMOL/L (ref 23–29)
CO2 SERPL-SCNC: 24 MMOL/L (ref 23–29)
CREAT SERPL-MCNC: 1.2 MG/DL (ref 0.5–1.4)
CREAT SERPL-MCNC: 1.3 MG/DL (ref 0.5–1.4)
DIFFERENTIAL METHOD BLD: ABNORMAL
EOSINOPHIL # BLD AUTO: 1.1 K/UL (ref 0–0.5)
EOSINOPHIL NFR BLD: 6.7 % (ref 0–8)
ERYTHROCYTE [DISTWIDTH] IN BLOOD BY AUTOMATED COUNT: 18.6 % (ref 11.5–14.5)
EST. GFR  (NO RACE VARIABLE): 57 ML/MIN/1.73 M^2
EST. GFR  (NO RACE VARIABLE): >60 ML/MIN/1.73 M^2
GLUCOSE SERPL-MCNC: 108 MG/DL (ref 70–110)
GLUCOSE SERPL-MCNC: 133 MG/DL (ref 70–110)
HCT VFR BLD AUTO: 34.2 % (ref 40–54)
HGB BLD-MCNC: 10.4 G/DL (ref 14–18)
IMM GRANULOCYTES # BLD AUTO: 0.06 K/UL (ref 0–0.04)
IMM GRANULOCYTES NFR BLD AUTO: 0.4 % (ref 0–0.5)
LYMPHOCYTES # BLD AUTO: 1 K/UL (ref 1–4.8)
LYMPHOCYTES NFR BLD: 5.9 % (ref 18–48)
MAGNESIUM SERPL-MCNC: 2.3 MG/DL (ref 1.6–2.6)
MCH RBC QN AUTO: 26.1 PG (ref 27–31)
MCHC RBC AUTO-ENTMCNC: 30.4 G/DL (ref 32–36)
MCV RBC AUTO: 86 FL (ref 82–98)
MONOCYTES # BLD AUTO: 0.6 K/UL (ref 0.3–1)
MONOCYTES NFR BLD: 3.5 % (ref 4–15)
NEUTROPHILS # BLD AUTO: 13.5 K/UL (ref 1.8–7.7)
NEUTROPHILS NFR BLD: 83.3 % (ref 38–73)
NRBC BLD-RTO: 0 /100 WBC
PHOSPHATE SERPL-MCNC: 2.5 MG/DL (ref 2.7–4.5)
PLATELET # BLD AUTO: 362 K/UL (ref 150–450)
PMV BLD AUTO: 9.6 FL (ref 9.2–12.9)
POCT GLUCOSE: 125 MG/DL (ref 70–110)
POCT GLUCOSE: 143 MG/DL (ref 70–110)
POCT GLUCOSE: 148 MG/DL (ref 70–110)
POTASSIUM SERPL-SCNC: 3 MMOL/L (ref 3.5–5.1)
POTASSIUM SERPL-SCNC: 3.3 MMOL/L (ref 3.5–5.1)
PROT SERPL-MCNC: 7.9 G/DL (ref 6–8.4)
RBC # BLD AUTO: 3.99 M/UL (ref 4.6–6.2)
SODIUM SERPL-SCNC: 144 MMOL/L (ref 136–145)
SODIUM SERPL-SCNC: 144 MMOL/L (ref 136–145)
WBC # BLD AUTO: 16.18 K/UL (ref 3.9–12.7)

## 2024-07-27 PROCEDURE — 25000003 PHARM REV CODE 250: Performed by: INTERNAL MEDICINE

## 2024-07-27 PROCEDURE — 25000003 PHARM REV CODE 250

## 2024-07-27 PROCEDURE — 36415 COLL VENOUS BLD VENIPUNCTURE: CPT

## 2024-07-27 PROCEDURE — 27000207 HC ISOLATION

## 2024-07-27 PROCEDURE — 11000001 HC ACUTE MED/SURG PRIVATE ROOM

## 2024-07-27 PROCEDURE — 63600175 PHARM REV CODE 636 W HCPCS

## 2024-07-27 PROCEDURE — 80053 COMPREHEN METABOLIC PANEL: CPT

## 2024-07-27 PROCEDURE — 80048 BASIC METABOLIC PNL TOTAL CA: CPT | Mod: XB

## 2024-07-27 PROCEDURE — 84100 ASSAY OF PHOSPHORUS: CPT

## 2024-07-27 PROCEDURE — 83735 ASSAY OF MAGNESIUM: CPT

## 2024-07-27 PROCEDURE — 85025 COMPLETE CBC W/AUTO DIFF WBC: CPT

## 2024-07-27 RX ORDER — POTASSIUM CHLORIDE 7.45 MG/ML
10 INJECTION INTRAVENOUS
Status: COMPLETED | OUTPATIENT
Start: 2024-07-27 | End: 2024-07-27

## 2024-07-27 RX ORDER — AMLODIPINE BESYLATE 5 MG/1
5 TABLET ORAL DAILY
Status: DISCONTINUED | OUTPATIENT
Start: 2024-07-27 | End: 2024-07-29 | Stop reason: HOSPADM

## 2024-07-27 RX ORDER — POTASSIUM CHLORIDE 20 MEQ/1
40 TABLET, EXTENDED RELEASE ORAL ONCE
Status: DISCONTINUED | OUTPATIENT
Start: 2024-07-27 | End: 2024-07-29

## 2024-07-27 RX ADMIN — POTASSIUM CHLORIDE 10 MEQ: 7.46 INJECTION, SOLUTION INTRAVENOUS at 11:07

## 2024-07-27 RX ADMIN — APIXABAN 5 MG: 5 TABLET, FILM COATED ORAL at 08:07

## 2024-07-27 RX ADMIN — OXYCODONE 5 MG: 5 TABLET ORAL at 04:07

## 2024-07-27 RX ADMIN — OXYCODONE 5 MG: 5 TABLET ORAL at 09:07

## 2024-07-27 RX ADMIN — TAMSULOSIN HYDROCHLORIDE 0.4 MG: 0.4 CAPSULE ORAL at 08:07

## 2024-07-27 RX ADMIN — GABAPENTIN 100 MG: 100 CAPSULE ORAL at 08:07

## 2024-07-27 RX ADMIN — MUPIROCIN: 20 OINTMENT TOPICAL at 08:07

## 2024-07-27 RX ADMIN — POTASSIUM CHLORIDE 10 MEQ: 7.46 INJECTION, SOLUTION INTRAVENOUS at 10:07

## 2024-07-27 RX ADMIN — POTASSIUM CHLORIDE 10 MEQ: 7.46 INJECTION, SOLUTION INTRAVENOUS at 06:07

## 2024-07-27 RX ADMIN — AMLODIPINE BESYLATE 5 MG: 5 TABLET ORAL at 04:07

## 2024-07-27 RX ADMIN — INSULIN GLARGINE 6 UNITS: 100 INJECTION, SOLUTION SUBCUTANEOUS at 08:07

## 2024-07-27 RX ADMIN — POTASSIUM CHLORIDE 10 MEQ: 7.46 INJECTION, SOLUTION INTRAVENOUS at 08:07

## 2024-07-27 RX ADMIN — POTASSIUM CHLORIDE 10 MEQ: 7.46 INJECTION, SOLUTION INTRAVENOUS at 04:07

## 2024-07-27 RX ADMIN — VANCOMYCIN HYDROCHLORIDE 1500 MG: 1.5 INJECTION, POWDER, LYOPHILIZED, FOR SOLUTION INTRAVENOUS at 01:07

## 2024-07-27 RX ADMIN — POTASSIUM CHLORIDE 10 MEQ: 7.46 INJECTION, SOLUTION INTRAVENOUS at 07:07

## 2024-07-27 NOTE — CONSULTS
RD providing weekend coverage. Consult received for nutrition evaluation and treatment. Please see consult note from in house RD, Marilee Escobar, on 7/26/24 for nutrition recommendations.            Thank you,      Lisbeth Ayon, JANN, LDN

## 2024-07-27 NOTE — PLAN OF CARE
Pt AAO x 1 , nods to respond.  Delayed speech.   VSS.  Pt remained afebrile throughout this shift.   IV administered per order.   Pt remained free of falls this shift.   Pt with complaints of pain this shift.   Pain meds administered as ordered.    Blood glucose monitored, corrected via SSI.   Swallow study passed.   Plan of care reviewed. Patient verbalizes understanding.   Pt moving/turing q2. Frequent weight shifting encouraged.  Patient SR on monitor.  Wound care to sacral done. Picc line dressing change done. Podiatry consult pending.   Bed low, side rails up x 2, wheels locked, call light in reach.   Bed alarm maintained for safety.   Patient instructed to call for assistance.   Hourly rounding completed.   24 hour chart check completed.  Will continue to monitor.      Problem: Adult Inpatient Plan of Care  Goal: Plan of Care Review  Outcome: Progressing  Goal: Patient-Specific Goal (Individualized)  Outcome: Progressing

## 2024-07-27 NOTE — PLAN OF CARE
Problem: Adult Inpatient Plan of Care  Goal: Plan of Care Review  Outcome: Progressing  Goal: Patient-Specific Goal (Individualized)  Outcome: Progressing  Goal: Absence of Hospital-Acquired Illness or Injury  Outcome: Progressing  Goal: Optimal Comfort and Wellbeing  Outcome: Progressing  Goal: Readiness for Transition of Care  Outcome: Progressing     Problem: Diabetes Comorbidity  Goal: Blood Glucose Level Within Targeted Range  Outcome: Progressing     Problem: Infection  Goal: Absence of Infection Signs and Symptoms  Outcome: Progressing

## 2024-07-27 NOTE — PROGRESS NOTES
[facility] ID note    Patient afebrile.  WBC 16.2.  On vancomycin.  One dose piperacillin tazobactam yesterday.    Would continue to monitor off beta-lactam antibiotics  Continue vancomycin  Was neurologic changes have resolved, could rechallenge with piperacillin tazobactam to treat heel osteomyelitis along with vancomycin    Please call if any questions   Sameer Gayle  [facility] ID  [phone]

## 2024-07-27 NOTE — SUBJECTIVE & OBJECTIVE
Scheduled Meds:   apixaban  5 mg Oral BID    gabapentin  100 mg Oral BID    insulin glargine U-100 (Lantus)  6 Units Subcutaneous BID    mupirocin   Nasal BID    NIFEdipine  90 mg Oral Daily    pantoprazole  40 mg Oral Daily    potassium, sodium phosphates  1 packet Oral QID (AC & HS)    tamsulosin  0.4 mg Oral Daily    [START ON 7/27/2024] vancomycin (VANCOCIN) IV (PEDS and ADULTS)  1,500 mg Intravenous Q24H    white petrolatum   Topical (Top) Daily     Continuous Infusions:  PRN Meds:  Current Facility-Administered Medications:     acetaminophen, 650 mg, Oral, Q6H PRN    dextrose 10%, 12.5 g, Intravenous, PRN    dextrose 10%, 25 g, Intravenous, PRN    glucagon (human recombinant), 1 mg, Intramuscular, PRN    glucose, 16 g, Oral, PRN    glucose, 24 g, Oral, PRN    insulin aspart U-100, 0-5 Units, Subcutaneous, QID (AC + HS) PRN    naloxone, 0.02 mg, Intravenous, PRN    oxyCODONE, 5 mg, Oral, Q6H PRN    sodium chloride 0.9%, 10 mL, Intravenous, Q12H PRN    Pharmacy to dose Vancomycin consult, , , Once **AND** vancomycin - pharmacy to dose, , Intravenous, pharmacy to manage frequency    Review of patient's allergies indicates:  No Known Allergies     Past Medical History:   Diagnosis Date    Arthritis     legs    Bacteremia due to Gram-negative bacteria 3/23/2021    Diabetes mellitus     Diabetes mellitus, type 2     Hyperlipidemia     Hypertension     Osteomyelitis     Palliative care encounter 5/24/2023     Past Surgical History:   Procedure Laterality Date    ABOVE-KNEE AMPUTATION Left 3/27/2024    Procedure: AMPUTATION, ABOVE KNEE;  Surgeon: Adal Arellano MD;  Location: [address];  Service: Vascular;  Laterality: Left;    ANGIOGRAPHY OF LOWER EXTREMITY N/A 2/3/2021    Procedure: Angiogram Extremity Bilateral;  Surgeon: Ernst Chacko MD;  Location: [facility] OR [address] [place] [address];  Service: Peripheral Vascular;  Laterality: N/A;  7.4 mintues fluoroscopy time  816.15 mGy  170.17 Gycm2    AORTOGRAPHY WITH  EXTREMITY RUNOFF Bilateral 2/3/2021    Procedure: AORTOGRAM, WITH EXTREMITY RUNOFF;  Surgeon: Ernst Chacko MD;  Location: [facility] OR 2ND FLR;  Service: Peripheral Vascular;  Laterality: Bilateral;    DEBRIDEMENT OF FOOT Left 2/23/2021    Procedure: DEBRIDEMENT, LEFT HEEL;  Surgeon: Mayra Schroeder DPM;  Location: [facility] OR 1ST FLR;  Service: Podiatry;  Laterality: Left;    ESOPHAGOGASTRODUODENOSCOPY N/A 6/28/2024    Procedure: EGD (ESOPHAGOGASTRODUODENOSCOPY);  Surgeon: Adal Chandra MD;  Location: [facility] ENDO;  Service: Gastroenterology;  Laterality: N/A;    FOOT AMPUTATION  October 2010    left high midfoot amputation    IMPLANTATION OF LEADLESS PACEMAKER N/A 10/12/2023    Procedure: [id number];  Surgeon: VANESSA Delatorre MD;  Location: [facility] EP LAB;  Service: Cardiology;  Laterality: N/A;  AVB, MICRA, EH, ANES, RM 13447       Family History       Problem Relation (Age of Onset)    Cancer Brother    Diabetes Mother, Sister    Heart disease Mother    Stroke Sister          Tobacco Use    Smoking status: Never    Smokeless tobacco: Never   Substance and Sexual Activity    Alcohol use: No     Comment: occassional    Drug use: No    Sexual activity: Not Currently     Review of Systems   Reason unable to perform ROS: nonverbal.     Objective:     Vital Signs (Most Recent):  Temp: 97.9 °F (36.6 °C) (07/26/24 1613)  Pulse: 95 (07/26/24 1613)  Resp: 18 (07/26/24 1807)  BP: (!) 171/80 (07/26/24 1613)  SpO2: 100 % (07/26/24 1613) Vital Signs (24h Range):  Temp:  [97.6 °F (36.4 °C)-98.3 °F (36.8 °C)] 97.9 °F (36.6 °C)  Pulse:  [82-95] 95  Resp:  [18-20] 18  SpO2:  [97 %-100 %] 100 %  BP: (139-180)/(71-80) 171/80     Weight: 78.8 kg (173 lb 11.6 oz)  Body mass index is 24.93 kg/m².    Foot Exam    Right Foot/Ankle     Inspection and Palpation  Ecchymosis: none  Swelling: none   Skin Exam: dry skin, abnormal color and ulcer; no drainage, no maceration and no cellulitis      Neurovascular  Dorsalis pedis: 1+  Posterior tibial: absent  Saphenous nerve sensation: diminished  Tibial nerve sensation: diminished  Superficial peroneal nerve sensation: diminished  Deep peroneal nerve sensation: diminished  Sural nerve sensation: diminished    Tests  Alexandru's Sign: negative    Comments  R heel wound positive probe to bone but stable since last seen by podiatry, with no signs of active infection. Granular base with fibrotic and necrotic wound edges, no drainage noted.     Left Foot/Ankle      Comments  AKA      Laboratory:  BMP:   Recent Labs   Lab 07/26/24  0309   *   *   K 3.2*   *   CO2 23   BUN 18   CREATININE 1.1   CALCIUM 9.3   MG 2.0     CBC:   Recent Labs   Lab 07/26/24  0309   WBC 10.93   RBC 3.52*   HGB 9.3*   HCT 30.7*      MCV 87   MCH 26.4*   MCHC 30.3*       Diagnostic Results:  I have reviewed all pertinent imaging results/findings within the past 24 hours.    Clinical Findings:

## 2024-07-27 NOTE — CONSULTS
[facility] - Telemetry  Podiatry  Consult Note    Patient Name: Saji Castañeda  MRN: [id number]  Admission Date: 7/25/2024  Hospital Length of Stay: 0 days  Attending Physician: Vinod Elise MD  Primary Care Provider: Nael Diallo MD     Inpatient consult to Podiatry  Consult performed by: Hamlet Mueller MD  Consult ordered by: Luis Escobedo MD  Reason for consult: see below        Subjective:     History of Present Illness:  Saji Castañeda is a 74 y.o. Male with a PMH of T2DM on long-term insulin, PVD with diabetic ulcer s/p left AKA (3/27/2024), Chronic Multifactorial Anemia, BPH s/p Chronic Sams, paroxysmal A-fib on eliquis, CHB s/p PPM (10/12/2023), CKD3, Multifactorial HTN and HLD. chronic Osteomyelitis of R foot who presented to ED per nursing home for decreased mental status for 1 day (7/26/2024). Patient was stabilized and is prepared to discharge today 7/26/2024.    Podiatry consulted for R calcaneus osteomyelitis and heel wound. R foot dressing from nursing home is clean, dry, and intact. Pt previously denied surgical intervention and today is nonverbal. ALBERTO MACEDO, pt unable to be assessed verbally.    Scheduled Meds:   apixaban  5 mg Oral BID    gabapentin  100 mg Oral BID    insulin glargine U-100 (Lantus)  6 Units Subcutaneous BID    mupirocin   Nasal BID    NIFEdipine  90 mg Oral Daily    pantoprazole  40 mg Oral Daily    potassium, sodium phosphates  1 packet Oral QID (AC & HS)    tamsulosin  0.4 mg Oral Daily    [START ON 7/27/2024] vancomycin (VANCOCIN) IV (PEDS and ADULTS)  1,500 mg Intravenous Q24H    white petrolatum   Topical (Top) Daily     Continuous Infusions:  PRN Meds:  Current Facility-Administered Medications:     acetaminophen, 650 mg, Oral, Q6H PRN    dextrose 10%, 12.5 g, Intravenous, PRN    dextrose 10%, 25 g, Intravenous, PRN    glucagon (human recombinant), 1 mg, Intramuscular, PRN    glucose, 16 g, Oral, PRN    glucose, 24 g, Oral, PRN    insulin aspart U-100, 0-5 Units,  Subcutaneous, QID (AC + HS) PRN    naloxone, 0.02 mg, Intravenous, PRN    oxyCODONE, 5 mg, Oral, Q6H PRN    sodium chloride 0.9%, 10 mL, Intravenous, Q12H PRN    Pharmacy to dose Vancomycin consult, , , Once **AND** vancomycin - pharmacy to dose, , Intravenous, pharmacy to manage frequency    Review of patient's allergies indicates:  No Known Allergies     Past Medical History:   Diagnosis Date    Arthritis     legs    Bacteremia due to Gram-negative bacteria 3/23/2021    Diabetes mellitus     Diabetes mellitus, type 2     Hyperlipidemia     Hypertension     Osteomyelitis     Palliative care encounter 5/24/2023     Past Surgical History:   Procedure Laterality Date    ABOVE-KNEE AMPUTATION Left 3/27/2024    Procedure: AMPUTATION, ABOVE KNEE;  Surgeon: Adal Arellano MD;  Location: [facility] OR [address] [place] [address];  Service: Vascular;  Laterality: Left;    ANGIOGRAPHY OF LOWER EXTREMITY N/A 2/3/2021    Procedure: Angiogram Extremity Bilateral;  Surgeon: Ernst Chacko MD;  Location: [address];  Service: Peripheral Vascular;  Laterality: N/A;  7.4 mintues fluoroscopy time  816.15 mGy  170.17 Gycm2    AORTOGRAPHY WITH EXTREMITY RUNOFF Bilateral 2/3/2021    Procedure: AORTOGRAM, WITH EXTREMITY RUNOFF;  Surgeon: Ernst Chacko MD;  Location: [address];  Service: Peripheral Vascular;  Laterality: Bilateral;    DEBRIDEMENT OF FOOT Left 2/23/2021    Procedure: DEBRIDEMENT, LEFT HEEL;  Surgeon: Mayra Schroeder DPM;  Location: [address];  Service: Podiatry;  Laterality: Left;    ESOPHAGOGASTRODUODENOSCOPY N/A 6/28/2024    Procedure: EGD (ESOPHAGOGASTRODUODENOSCOPY);  Surgeon: Adal Chandra MD;  Location: [facility] ENDO;  Service: Gastroenterology;  Laterality: N/A;    FOOT AMPUTATION  October 2010    left high midfoot amputation    IMPLANTATION OF LEADLESS PACEMAKER N/A 10/12/2023    Procedure: [id number];  Surgeon: VANESSA Delatorre MD;  Location: [facility] EP LAB;   Service: Cardiology;  Laterality: N/A;  AVB, MICRA, EH, ANES, RM 64831       Family History       Problem Relation (Age of Onset)    Cancer Brother    Diabetes Mother, Sister    Heart disease Mother    Stroke Sister          Tobacco Use    Smoking status: Never    Smokeless tobacco: Never   Substance and Sexual Activity    Alcohol use: No     Comment: occassional    Drug use: No    Sexual activity: Not Currently     Review of Systems   Reason unable to perform ROS: nonverbal.     Objective:     Vital Signs (Most Recent):  Temp: 97.9 °F (36.6 °C) (07/26/24 1613)  Pulse: 95 (07/26/24 1613)  Resp: 18 (07/26/24 1807)  BP: (!) 171/80 (07/26/24 1613)  SpO2: 100 % (07/26/24 1613) Vital Signs (24h Range):  Temp:  [97.6 °F (36.4 °C)-98.3 °F (36.8 °C)] 97.9 °F (36.6 °C)  Pulse:  [82-95] 95  Resp:  [18-20] 18  SpO2:  [97 %-100 %] 100 %  BP: (139-180)/(71-80) 171/80     Weight: 78.8 kg (173 lb 11.6 oz)  Body mass index is 24.93 kg/m².    Foot Exam    Right Foot/Ankle     Inspection and Palpation  Ecchymosis: none  Swelling: none   Skin Exam: dry skin, abnormal color and ulcer; no drainage, no maceration and no cellulitis     Neurovascular  Dorsalis pedis: 1+  Posterior tibial: absent  Saphenous nerve sensation: diminished  Tibial nerve sensation: diminished  Superficial peroneal nerve sensation: diminished  Deep peroneal nerve sensation: diminished  Sural nerve sensation: diminished    Tests  Alexandru's Sign: negative    Comments  R heel wound positive probe to bone but stable since last seen by podiatry, with no signs of active infection. Granular base with fibrotic and necrotic wound edges, no drainage noted.     Left Foot/Ankle      Comments  AKA      Laboratory:  BMP:   Recent Labs   Lab 07/26/24  0309   *   *   K 3.2*   *   CO2 23   BUN 18   CREATININE 1.1   CALCIUM 9.3   MG 2.0     CBC:   Recent Labs   Lab 07/26/24  0309   WBC 10.93   RBC 3.52*   HGB 9.3*   HCT 30.7*      MCV 87   MCH 26.4*   MCHC  30.3*       Diagnostic Results:  I have reviewed all pertinent imaging results/findings within the past 24 hours.    Clinical Findings:          Assessment/Plan:     Orthopedic  Pressure injury of right heel, stage 4  Chronic osteomyelitis MRSA, pseudomonas of R calcaneus, pt denied debridement or surgical intervention previously. R heel wound positive probe to bone but stable since seen by podiatry during last admission, with no signs of active infection. Granular base with fibrotic and necrotic wound edges, no drainage noted.    - Wound dressed with betadine soaked gauze, dry gauze, kerlix, ACE (no compression)  - Wound care orders to follow  - Abx per primary / ID  - Pt to continue woundcare orders being followed in outpatient setting  - WBAT Darco shoe RLE  - Offloading boot to be worn at rest RLE  - Podiatry to sign off    Future discharge recs:  - SNF or HH to apply bandaging as described above  - Abx plan per ID  - Patient to only to transfer in short distances to affected foot with Darco shoe as tolerated  - Offloading boot to be worn at rest RLE  - Patient to keep dressings clean dry and intact  - Patient explained the importance of daily foot checks       Thank you for your consult.     Hamlet Mueller MD  Podiatry  [facility] - Telemetry

## 2024-07-27 NOTE — PROGRESS NOTES
"[facility] Medicine Progress Note    Primary Team: [facility] [place] Hospitalist Team B  Attending Physician: Vinod Elise MD  Resident: Narciso Salazar DO  Intern: Blake Naranjo MD & Pippa Lai DO    Subjective:      Tremors no longer present on exam this AM. Pt able to verbalize where he is having pain in his leg. Following simple commands. Updated sister at bedside.   Diet advanced to minced and moist with feeding assistance.    Objective:     Last 24 Hour Vital Signs:  BP  Min: 136/77  Max: 171/80  Temp  Av.3 °F (36.8 °C)  Min: 97.6 °F (36.4 °C)  Max: 99 °F (37.2 °C)  Pulse  Av  Min: 58  Max: 95  Resp  Av.3  Min: 18  Max: 20  SpO2  Av.2 %  Min: 97 %  Max: 100 %  Height  Av' 10" (177.8 cm)  Min: 5' 10" (177.8 cm)  Max: 5' 10" (177.8 cm)  Weight  Av.8 kg (173 lb 11.6 oz)  Min: 78.8 kg (173 lb 11.6 oz)  Max: 78.8 kg (173 lb 11.6 oz)  I/O last 3 completed shifts:  In: 460 [P.O.:360; IV Piggyback:100]  Out: 2601 [Urine:2600; Stool:1]    Physical Examination:  Physical Exam  Constitutional:       Appearance: He is ill-appearing.   Eyes:      Extraocular Movements: Extraocular movements intact.   Cardiovascular:      Rate and Rhythm: Normal rate.      Pulses: Normal pulses.      Heart sounds: No murmur heard.  Pulmonary:      Effort: Pulmonary effort is normal. No respiratory distress.   Abdominal:      Tenderness: There is no abdominal tenderness.   Musculoskeletal:      Comments: Left AKA. Right foot with stage 4 ulceration positive osteomyelitis. Wrapped and has boot in place. Stage 4 sacral ulcer present. Images in media.    Skin:     Comments: Dry skin to chest and back with multiple excoriations from scratching.    Neurological:      Mental Status: He is alert.      Comments: Unable to speak sentences, but following simple commands. Able to verbalize where he is having pain.       Laboratory:  Recent Labs   Lab 24  1117 24  1554 24  2036 24  0309 24  0325   WBC " 9.92  --   --  10.93 16.18*   HGB 8.8*  --   --  9.3* 10.4*   HCT 28.4*  --   --  30.7* 34.2*     --   --  374 362   MCV 85  --   --  87 86   RDW 18.5*  --   --  18.7* 18.6*   *  --  148* 147* 144   K 2.5*   < > 3.0* 3.2* 3.0*   *  --  115* 113* 109   CO2 24  --  25 23 23   BUN 18  --  18 18 17   CREATININE 1.2  --  1.1 1.1 1.2   *  --  107 116* 108   PROT 7.1  --   --  7.0 7.9   ALBUMIN 1.7*  --   --  1.7* 1.9*   BILITOT 0.3  --   --  0.3 0.4   AST 18  --   --  21 16   ALKPHOS 74  --   --  78 89   ALT 12  --   --  10 9*    < > = values in this interval not displayed.     Laboratory Data Reviewed:  Pertinent Findings:  Phos 2.5  K 3.2 -> 3  Na 147 -> 144    Microbiology Data Reviewed:  Pertinent Findings:  BCx x2 pending    Other Results:  EKG (my interpretation): EKG with wide QRS rhythm at rate 87. LBBB. no acute ST segment or T wave changes.     Radiology:  CT Head 7/25/24  Impression:     No CT evidence of acute intracranial abnormality. Clinical correlation and further evaluation as warranted.     Chronic senescent and microvascular ischemic change.     Xray Chest 7/25/24  Impression:     Stable chest with no acute findings.     Current Medications:     Infusions:       Scheduled:   apixaban  5 mg Oral BID    gabapentin  100 mg Oral BID    insulin glargine U-100 (Lantus)  6 Units Subcutaneous BID    mupirocin   Nasal BID    NIFEdipine  90 mg Oral Daily    pantoprazole  40 mg Oral Daily    potassium chloride  10 mEq Intravenous Q1H    potassium chloride  40 mEq Oral Once    tamsulosin  0.4 mg Oral Daily    vancomycin (VANCOCIN) IV (PEDS and ADULTS)  1,500 mg Intravenous Q24H    white petrolatum   Topical (Top) Daily        PRN:    Current Facility-Administered Medications:     acetaminophen, 650 mg, Oral, Q6H PRN    dextrose 10%, 12.5 g, Intravenous, PRN    dextrose 10%, 25 g, Intravenous, PRN    glucagon (human recombinant), 1 mg, Intramuscular, PRN    glucose, 16 g, Oral, PRN     glucose, 24 g, Oral, PRN    insulin aspart U-100, 0-5 Units, Subcutaneous, QID (AC + HS) PRN    naloxone, 0.02 mg, Intravenous, PRN    oxyCODONE, 5 mg, Oral, Q6H PRN    sodium chloride 0.9%, 10 mL, Intravenous, Q12H PRN    Pharmacy to dose Vancomycin consult, , , Once **AND** vancomycin - pharmacy to dose, , Intravenous, pharmacy to manage frequency    Antibiotics and Day Number of Therapy:  Cefepime IV  Vancomycin IV    Lines and Day Number of Therapy:  PICC  Urethral catheter    Assessment:     Saji Castañeda is a 74 y.o. Male with a PMH of T2DM on long-term insulin, PVD with diabetic ulcer s/p left AKA (3/27/2024), Chronic Multifactorial Anemia, BPH s/p Chronic Sams, paroxysmal A-fib on eliquis, CHB s/p PPM (10/12/2023), Multifactorial HTN, and HLD Osteomyelitis of R foot who presented to ED per nursing home for decreased mental status for 1 day. Per nursing home staff, patient usually oriented x4 and is conversational, able to feed himself on his own. However, now presenting with expressive aphasia as well as tremors to bilateral upper extremities. Labs remarkable for potassium of 2.5, Na 146, Cl 112, , Ua with 3+ Leukocyte Esterase and 44 WBC. CT Head with no acute abnormalities. EKG with new LBBB and QT prolongation. LBBB can be seen with pacemaker ventricular pacing. Patient admitted for work up and management of decreased mental status and treatment of electrolyte imbalance. Mental status still not improving with correction of electrolytes. Consulted Neurology, recommending MRI brain and c-spine. Nephrology consulted for electrolyte replacement recommendations. ID consulted for abx management. Pending MRI studies at [facility] d/t pacemaker.      Plan:     #Expressive aphasia  #Acute Encephalopathy  - patient is a/o x4 at baseline, but now presenting with aphasia and bilateral upper extremity tremors - tremors resolving  - CT Head with no acute abnormalities  - mental status still not improving with  correction of electrolytes  - Neurology consulted: recommend MRI brain and c-spine  - Could be related to outpatient antibiotic regimen, Cefepime discontinued 7/26  Plan  - Speaking with main campus about transport for MRI, will call tomorrow for imaging on Monday   - Appreciate Neurology recs     #Electrolyte imbalances  - potassium 2.5 -> 3, sodium 146 -> 148, Phos 2.5 this AM  - Nephrology consulted, appreciate recs  Plan  - monitor with daily CMP  - replace electrolytes PRN  - discontinued home diuretic regimen    #Osteomyelitis of right lower extremity  - Heel osteomyelitis with MRSA and pseudomonas, diagnosed last month  - Pt discharged on Vancomycin and Cefepime\  - US LE R arteries (7/27): CHRISTIAN is patent, moderate proximal focal stenosis - adequate blood flow for healing  Plan  - ID consulted; discontinuing Cefepime and will let it wash out - could be contributing to change in mental status  - Plan to initiate Zosyn once neurologic status improved and will continue with Vancomycin and Zosyn   - Wound care and Podiatry consulted, appreciate recs     #Paroxysmal Afib  #Complete Heart block s/p Pacemaker  - EKG with wide complex QRS, new LBBB which can be seen in patients with pacemaker that is ventricular paced, repeat EKG (7/26): wide complex QRS no longer present, ventricular-paced rhythm  - on home Eliquis 5 mg BID  - TTE (7/26): EF 55-60%, concentric hypertrophy, septal motion consistent with bundle branch block  Plan  - repeat EKG in morning s/p electrolyte correction  - Continue home Apixaban      #Essential Hypertension  - /70s on admission  - home medications include nifedipine XL 90 mg  Plan  - Nifedipine cannot be crushed, will trial Amlodipine 5, can adjust dose as needed     #Insulin dependent Type 2 Diabetes Mellitus  - repeat A1c 6 (6.8 one month ago)  - home medications include Lantus 12u BID/Novolog 5u with meals  Plan  - On Lantus 6u  - on SSI  - Blood glucose goal: 140 - 180     #Anemia  of chronic disease  - hgb 8.8 upon admission, improving, at baseline  - Iron studies 6/27/24  Fe 46  Transferrin 72  TIBC 107  Sat 43  Ferritin 697  Plan  - Transfuse if <7  - Daily CBCs     #BPH s/p fung placement  - on tamsulosin 0.4 mg  - Ua with 1+ LE and 44 WBC. Chronically infected  Plan  - no need to treat with abx due to chronic fung. Likely colonization      Diet: diabetic  DVT: eliquis  Dispo: admit to medicine for management of electrolyte abnormalities and acute encephalopathy. Pending MRI studies.      Code Status:      Full code    Narciso Salazar DO  [facility] Internal Medicine -II    [facility] Medicine Hospitalist Pager numbers:   [facility] Hospitalist Medicine Team A (Lorne/Riki): [phone]  [facility] Hospitalist Medicine Team B (Arik/Lucio):  [phone]

## 2024-07-27 NOTE — PROGRESS NOTES
NEUROLOGY CONSULT EVALUATION    Reason for consult:  Altered Mental Status and Seizures    CC:  Mental Status change      Interval HPI :  Patient examined at bedside. He is able to follow commands and verbalizes with encouragement. No new complaints or deficits at this time. MRI pending.    HPI:   Saji Castañeda is a 74 y.o. year old right handed male with a PMH of DMII, PVD, BPH, A-Fib (on eliquis), HTN, CHB s/p PPM,  who presented to [facility] from nursing facility on 7/25/2024 with a chief complaint of altered mental status. Per nursing facility, patient was talking fine, but then became altered, and unable to speak. Patient has left BKA, and has osteomyelitis, of RLE, on cefepime. On exam, patient understands commands, answers some sentences, but speech is dysarthric.     ROS: Pertinent items are noted in HPI.    Histories:     Allergies:  Patient has no known allergies.    Current Medications:    Current Facility-Administered Medications   Medication Dose Route Frequency Provider Last Rate Last Admin    acetaminophen tablet 650 mg  650 mg Oral Q6H PRN Luis Escobedo MD        apixaban tablet 5 mg  5 mg Oral BID Luis Escobedo MD   5 mg at 07/27/24 0837    dextrose 10% bolus 125 mL 125 mL  12.5 g Intravenous PRN Luis Escobedo MD        dextrose 10% bolus 250 mL 250 mL  25 g Intravenous PRN Luis Escobedo MD        gabapentin capsule 100 mg  100 mg Oral BID Luis Escobedo MD   100 mg at 07/27/24 0837    glucagon (human recombinant) injection 1 mg  1 mg Intramuscular PRN Luis Escobedo MD        glucose chewable tablet 16 g  16 g Oral PRN Luis Escobedo MD        glucose chewable tablet 24 g  24 g Oral PRN Luis Escobedo MD        insulin aspart U-100 pen 0-5 Units  0-5 Units Subcutaneous QID (AC + HS) PRN Luis Escobedo MD        insulin glargine U-100 (Lantus) pen 6 Units  6 Units Subcutaneous BID Luis Escobedo MD   6 Units at 07/27/24 0837    mupirocin 2 % ointment   Nasal BID Vinod Elise MD   Given at 07/27/24 0837     naloxone 0.4 mg/mL injection 0.02 mg  0.02 mg Intravenous PRN Luis Escobedo MD        NIFEdipine 24 hr tablet 90 mg  90 mg Oral Daily Luis Escobedo MD        oxyCODONE immediate release tablet 5 mg  5 mg Oral Q6H PRN Luis Escobedo MD   5 mg at 07/27/24 0937    pantoprazole EC tablet 40 mg  40 mg Oral Daily Luis Escobedo MD        potassium chloride SA CR tablet 40 mEq  40 mEq Oral Once Narciso Salazar MD        sodium chloride 0.9% flush 10 mL  10 mL Intravenous Q12H PRN Luis Escobedo MD        tamsulosin 24 hr capsule 0.4 mg  0.4 mg Oral Daily Luis Escobedo MD   0.4 mg at 07/27/24 0837    vancomycin - pharmacy to dose   Intravenous pharmacy to manage frequency Luis Escobedo MD        vancomycin 1,500 mg in D5W 250 mL IVPB (Vial-Mate)  1,500 mg Intravenous Q24H Luis Escobedo MD   Stopped at 07/27/24 0326    white petrolatum 41 % ointment   Topical (Top) Daily Luis Escobedo MD           Past Medical/Surgical/Family History:  PMHx:   Past Medical History:   Diagnosis Date    Arthritis     legs    Bacteremia due to Gram-negative bacteria 3/23/2021    Diabetes mellitus     Diabetes mellitus, type 2     Hyperlipidemia     Hypertension     Osteomyelitis     Palliative care encounter 5/24/2023      Surgeries:   Past Surgical History:   Procedure Laterality Date    ABOVE-KNEE AMPUTATION Left 3/27/2024    Procedure: AMPUTATION, ABOVE KNEE;  Surgeon: Adal Arellano MD;  Location: [address];  Service: Vascular;  Laterality: Left;    ANGIOGRAPHY OF LOWER EXTREMITY N/A 2/3/2021    Procedure: Angiogram Extremity Bilateral;  Surgeon: Ernst Chacko MD;  Location: [facility] OR [address] [place] [address];  Service: Peripheral Vascular;  Laterality: N/A;  7.4 mintues fluoroscopy time  816.15 mGy  170.17 Gycm2    AORTOGRAPHY WITH EXTREMITY RUNOFF Bilateral 2/3/2021    Procedure: AORTOGRAM, WITH EXTREMITY RUNOFF;  Surgeon: Ernst Chacko MD;  Location: [address];  Service: Peripheral Vascular;  Laterality: Bilateral;    DEBRIDEMENT  OF FOOT Left 2/23/2021    Procedure: DEBRIDEMENT, LEFT HEEL;  Surgeon: Mayra Schroeder DPM;  Location: [facility] OR [address] [place] [address];  Service: Podiatry;  Laterality: Left;    ESOPHAGOGASTRODUODENOSCOPY N/A 6/28/2024    Procedure: EGD (ESOPHAGOGASTRODUODENOSCOPY);  Surgeon: Adal Chandra MD;  Location: [facility] ENDO;  Service: Gastroenterology;  Laterality: N/A;    FOOT AMPUTATION  October 2010    left high midfoot amputation    IMPLANTATION OF LEADLESS PACEMAKER N/A 10/12/2023    Procedure: [id number];  Surgeon: VANESSA Delatorre MD;  Location: [facility] EP LAB;  Service: Cardiology;  Laterality: N/A;  AVB, MICRA, EH, [place], [place] [id number]      Family  Hx:   Family History   Problem Relation Name Age of Onset    Diabetes Mother      Heart disease Mother      Stroke Sister      Cancer Brother      Diabetes Sister      ; no family history of nerve or muscle disease    Social History:  Tobacco: unknown tobacco use  EtOH: history unreliable  Illicit drugs: unkown      Current Evaluation:     Vital Signs:   Vitals:    07/27/24 0937   BP:    Pulse:    Resp: 16   Temp:         Neurological Examination   Gen: Alert and oriented to name, and place only  Language: dysarthria and aphasia at times    CN  CN II - Visual fields intact, PERRLA  CN III, IV, VI - EOM intact without nystagmus  CN V1, V2, V3 - Facial sensation preserved bilaterally  CN VII - Patient is able to smile and raise eyebrows symmetrically  CN VIII - No hearing deficit  CN IX and X - Palate rises symmetrically, uvula is midline  CN XI - Motor strength of shoulder shrug is 5/5 bilaterally  CN XII - Tongue protrudes midline without fasciculation    Motor     Left Right   Shoulder abduction 5/5 5/5   Shoulder adduction 5/5 5/5   Elbow flexion 5/5 5/5   Elbow extension 5/5 5/5   Wrist flexion 5/5 5/5   Wrist extension 5/5 5/5    strength 5/5 5/5   Hip flexion 5/5 5/5   Knee flexion BKA 5/5   Knee extension BKA 5/5   Ankle plantarflexion BKA 5/5  "  Ankle dorsiflexion BKA 5/5     Normal muscle tone, no significant limitations in range of motion    Sensory: Intact to light touch in BUE and BLE, no neglect    Reflexes           Left       Right   Brachioradialis        2+         2+   Bicep        2+         2+   Tricep        2+         2+   Patellar   deferred         2+   Achilles      BKA         2+   Babinski      BKA    Negative     Cerebellar: Significant jerking; unable to perform    Gait: deferred due to patient safety.     LABORATORY STUDIES:  CBC:   Recent Labs   Lab 07/25/24 1117 07/26/24 0309 07/27/24  0325   WBC 9.92 10.93 16.18*   HGB 8.8* 9.3* 10.4*   HCT 28.4* 30.7* 34.2*    374 362   MCV 85 87 86   RDW 18.5* 18.7* 18.6*     BMP:   Recent Labs   Lab 07/25/24 1117 07/25/24  1554 07/25/24 2036 07/25/24 2115 07/26/24 0309 07/27/24  0325   *  --  148*  --  147* 144   K 2.5*   < > 3.0*  --  3.2* 3.0*   *  --  115*  --  113* 109   CO2 24  --  25  --  23 23   BUN 18  --  18  --  18 17   CREATININE 1.2  --  1.1  --  1.1 1.2   *  --  107  --  116* 108   CALCIUM 9.4  --  9.2  --  9.3 9.5   MG 1.8  --   --   --  2.0 2.3   PHOS  --   --   --  2.1* 2.2* 2.5*    < > = values in this interval not displayed.     LFTs:   Recent Labs   Lab 07/25/24 1117 07/26/24 0309 07/27/24  0325   PROT 7.1 7.0 7.9   ALBUMIN 1.7* 1.7* 1.9*   BILITOT 0.3 0.3 0.4   AST 18 21 16   ALKPHOS 74 78 89   ALT 12 10 9*     Coags: No results for input(s): "INR", "PROTIME", "PTT" in the last 168 hours.  FLP:   Recent Labs   Lab 07/25/24 2115   CHOL 108*   LDLCALC 54.6*   TRIG 52   CHOLHDL 39.8     DM:   Recent Labs   Lab 07/25/24 2036 07/25/24  2115 07/26/24  0309 07/27/24  0325   HGBA1C  --  6.0*  --   --    LDLCALC  --  54.6*  --   --    CREATININE 1.1  --  1.1 1.2     Thyroid:   Recent Labs   Lab 07/25/24 2114   TSH 1.563     Anemia: No results for input(s): "IRON", "TIBC", "FERRITIN", "[id number]", "FOLATE" in the last 168 hours.  Cardiac:   Recent " Labs   Lab 07/25/24  1117 07/25/24  1123 07/25/24  2115   TROPONINI 0.020  --  0.021   BNP  --  173*  --      Urinalysis:   Recent Labs   Lab 07/25/24  1257 07/25/24  1716   COLORU Yellow Yellow   APPEARANCEUA Hazy* Hazy*   PHUR 6.0 6.0   SPECGRAV 1.020 1.015   PROTEINUA 2+* 2+*   GLUCUA Negative Negative   KETONESU Trace* Negative   BILIRUBINUA Negative Negative   OCCULTUA 2+* 2+*   NITRITE Negative Negative   UROBILINOGEN Negative Negative   LEUKOCYTESUR 3+* 1+*     Urine Micro:  Recent Labs   Lab 07/25/24  1257 07/25/24  1716   RBCUA 15* 4   WBCUA >100* 44*   BACTERIA Rare Rare   SQUAMEPITHEL 3 0   MICROCMT SEE COMMENT SEE COMMENT         RADIOLOGY STUDIES:  I have personally reviewed the images performed.   US Lower Extremity Arteries Right   Final Result      The CHRISTIAN is patent but demonstrates evidence of moderate proximal focal stenosis.      Otherwise, no significant change or new abnormality.         Electronically signed by: Eric Carlos MD   Date:    07/27/2024   Time:    08:46      X-Ray Chest AP Portable   Final Result      Stable chest with no acute findings.         Electronically signed by: Seymour Adkins   Date:    07/25/2024   Time:    22:20      CT Head Without Contrast   Final Result      No CT evidence of acute intracranial abnormality. Clinical correlation and further evaluation as warranted.      Chronic senescent and microvascular ischemic change.         Electronically signed by: Hai Vargas MD   Date:    07/25/2024   Time:    17:38      MRI Brain W WO Contrast    (Results Pending)   MRI Cervical Spine Without Contrast    (Results Pending)             Assessment:  Hector Julien is a 73 y/o male consulted to [facility] for altered mental status, dysarthric speech and seizures. Per primary team, patient at baseline at nursing home, able to converse normally. Speech dysarthric on exam. Unable to perform cerebellar testing due to jerking hand movements. Seizure like events and  AMS can be due to cefepime as cefepime lowers seizure threshold. Patient has some UMN signs that may be due to Cervical Stenosis, thus MRI C-Spine is warranted. Seizure history is new, with no prior seizures, thus recommending MRI Brain w w/o contrast. Patient has a history of atrial fibrillation, on eliquis, but concern for shower emboli/stroke. Patient has CHB with Pacemaker, thus will need to go to [facility] for imaging.    Recommendations  - MRI Brain w and w/o Contrast  - MRI C-Spine  - Continue Eliquis BID  - ID recs appreciated  - remainder management per primary team    Discussed that it is illegal to drive for 6 months following a seizure.  Also advised to avoid operating heavy machinery, swimming alone, taking baths instead of showers, using front burners on the stove, climbing ladders, or other activities that would place himself or someone else at risk should he have a seizure.    Case discussed with Dr. Regan Porter DO MPH  [facility] PGY-2

## 2024-07-27 NOTE — ASSESSMENT & PLAN NOTE
Chronic osteomyelitis MRSA, pseudomonas of R calcaneus, pt denied debridement or surgical intervention previously. R heel wound positive probe to bone but stable since seen by podiatry during last admission, with no signs of active infection. Granular base with fibrotic and necrotic wound edges, no drainage noted.    - Wound dressed with betadine soaked gauze, dry gauze, kerlix, ACE (no compression)  - Wound care orders to follow  - Abx per primary / ID  - Pt to continue woundcare orders being followed in outpatient setting  - WBAT Darco shoe RLE  - Podiatry to sign off    Future discharge recs:  - SNF or HH to apply bandaging as described above  - Abx plan per ID  - Patient to only to transfer in short distances to affected foot with Darco shoe as tolerated  - Patient to keep dressings clean dry and intact  - Patient explained the importance of daily foot checks

## 2024-07-28 LAB
ALBUMIN SERPL BCP-MCNC: 1.7 G/DL (ref 3.5–5.2)
ALP SERPL-CCNC: 82 U/L (ref 55–135)
ALT SERPL W/O P-5'-P-CCNC: 10 U/L (ref 10–44)
ANION GAP SERPL CALC-SCNC: 9 MMOL/L (ref 8–16)
AST SERPL-CCNC: 14 U/L (ref 10–40)
BASOPHILS # BLD AUTO: 0.04 K/UL (ref 0–0.2)
BASOPHILS NFR BLD: 0.3 % (ref 0–1.9)
BILIRUB SERPL-MCNC: 0.2 MG/DL (ref 0.1–1)
BUN SERPL-MCNC: 22 MG/DL (ref 8–23)
CALCIUM SERPL-MCNC: 9.1 MG/DL (ref 8.7–10.5)
CHLORIDE SERPL-SCNC: 110 MMOL/L (ref 95–110)
CO2 SERPL-SCNC: 25 MMOL/L (ref 23–29)
CREAT SERPL-MCNC: 1.3 MG/DL (ref 0.5–1.4)
DIFFERENTIAL METHOD BLD: ABNORMAL
EOSINOPHIL # BLD AUTO: 1.7 K/UL (ref 0–0.5)
EOSINOPHIL NFR BLD: 14.2 % (ref 0–8)
ERYTHROCYTE [DISTWIDTH] IN BLOOD BY AUTOMATED COUNT: 18.2 % (ref 11.5–14.5)
EST. GFR  (NO RACE VARIABLE): 57 ML/MIN/1.73 M^2
GLUCOSE SERPL-MCNC: 102 MG/DL (ref 70–110)
HCT VFR BLD AUTO: 28.3 % (ref 40–54)
HGB BLD-MCNC: 8.7 G/DL (ref 14–18)
IMM GRANULOCYTES # BLD AUTO: 0.04 K/UL (ref 0–0.04)
IMM GRANULOCYTES NFR BLD AUTO: 0.3 % (ref 0–0.5)
LYMPHOCYTES # BLD AUTO: 1.2 K/UL (ref 1–4.8)
LYMPHOCYTES NFR BLD: 9.5 % (ref 18–48)
MAGNESIUM SERPL-MCNC: 2 MG/DL (ref 1.6–2.6)
MCH RBC QN AUTO: 26.3 PG (ref 27–31)
MCHC RBC AUTO-ENTMCNC: 30.7 G/DL (ref 32–36)
MCV RBC AUTO: 86 FL (ref 82–98)
MONOCYTES # BLD AUTO: 0.6 K/UL (ref 0.3–1)
MONOCYTES NFR BLD: 5.1 % (ref 4–15)
NEUTROPHILS # BLD AUTO: 8.7 K/UL (ref 1.8–7.7)
NEUTROPHILS NFR BLD: 70.6 % (ref 38–73)
NRBC BLD-RTO: 0 /100 WBC
PHOSPHATE SERPL-MCNC: 2.8 MG/DL (ref 2.7–4.5)
PLATELET # BLD AUTO: 345 K/UL (ref 150–450)
PMV BLD AUTO: 8.9 FL (ref 9.2–12.9)
POCT GLUCOSE: 117 MG/DL (ref 70–110)
POCT GLUCOSE: 122 MG/DL (ref 70–110)
POCT GLUCOSE: 125 MG/DL (ref 70–110)
POCT GLUCOSE: 169 MG/DL (ref 70–110)
POCT GLUCOSE: 178 MG/DL (ref 70–110)
POTASSIUM SERPL-SCNC: 3.5 MMOL/L (ref 3.5–5.1)
PROT SERPL-MCNC: 6.9 G/DL (ref 6–8.4)
RBC # BLD AUTO: 3.31 M/UL (ref 4.6–6.2)
SODIUM SERPL-SCNC: 144 MMOL/L (ref 136–145)
VANCOMYCIN TROUGH SERPL-MCNC: 35 UG/ML (ref 10–22)
WBC # BLD AUTO: 12.26 K/UL (ref 3.9–12.7)

## 2024-07-28 PROCEDURE — 85025 COMPLETE CBC W/AUTO DIFF WBC: CPT

## 2024-07-28 PROCEDURE — 84100 ASSAY OF PHOSPHORUS: CPT

## 2024-07-28 PROCEDURE — 11000001 HC ACUTE MED/SURG PRIVATE ROOM

## 2024-07-28 PROCEDURE — 36415 COLL VENOUS BLD VENIPUNCTURE: CPT | Performed by: INTERNAL MEDICINE

## 2024-07-28 PROCEDURE — 27000207 HC ISOLATION

## 2024-07-28 PROCEDURE — 80202 ASSAY OF VANCOMYCIN: CPT | Performed by: INTERNAL MEDICINE

## 2024-07-28 PROCEDURE — 36415 COLL VENOUS BLD VENIPUNCTURE: CPT

## 2024-07-28 PROCEDURE — 25000003 PHARM REV CODE 250

## 2024-07-28 PROCEDURE — 83735 ASSAY OF MAGNESIUM: CPT

## 2024-07-28 PROCEDURE — 80053 COMPREHEN METABOLIC PANEL: CPT

## 2024-07-28 RX ORDER — FUROSEMIDE 20 MG/1
20 TABLET ORAL DAILY
Status: DISCONTINUED | OUTPATIENT
Start: 2024-07-28 | End: 2024-07-29 | Stop reason: HOSPADM

## 2024-07-28 RX ADMIN — MUPIROCIN: 20 OINTMENT TOPICAL at 09:07

## 2024-07-28 RX ADMIN — GABAPENTIN 100 MG: 100 CAPSULE ORAL at 09:07

## 2024-07-28 RX ADMIN — PANTOPRAZOLE SODIUM 40 MG: 40 TABLET, DELAYED RELEASE ORAL at 09:07

## 2024-07-28 RX ADMIN — WHITE PETROLATUM: 1.75 OINTMENT TOPICAL at 09:07

## 2024-07-28 RX ADMIN — FUROSEMIDE 20 MG: 20 TABLET ORAL at 11:07

## 2024-07-28 RX ADMIN — APIXABAN 5 MG: 5 TABLET, FILM COATED ORAL at 09:07

## 2024-07-28 RX ADMIN — INSULIN GLARGINE 6 UNITS: 100 INJECTION, SOLUTION SUBCUTANEOUS at 09:07

## 2024-07-28 RX ADMIN — TAMSULOSIN HYDROCHLORIDE 0.4 MG: 0.4 CAPSULE ORAL at 09:07

## 2024-07-28 RX ADMIN — AMLODIPINE BESYLATE 5 MG: 5 TABLET ORAL at 09:07

## 2024-07-28 NOTE — PLAN OF CARE
Problem: Adult Inpatient Plan of Care  Goal: Plan of Care Review  Outcome: Progressing  Goal: Patient-Specific Goal (Individualized)  Outcome: Progressing     Problem: Diabetes Comorbidity  Goal: Blood Glucose Level Within Targeted Range  Outcome: Progressing  Intervention: Monitor and Manage Glycemia  Flowsheets (Taken 7/28/2024 0049)  Glycemic Management:   blood glucose monitored   supplemental insulin given

## 2024-07-28 NOTE — PROGRESS NOTES
Pharmacokinetic Assessment Follow Up: IV Vancomycin    Vancomycin serum concentration assessment(s):    The trough level was drawn correctly and can be used to guide therapy at this time. The measurement is above the desired definitive target range of 15 to 20 mcg/mL.    Vancomycin Regimen Plan:    Re-dose when the random level is less than 20 mcg/mL, next level to be drawn at 0100 on 07/29/2024    Drug levels (last 3 results):  Recent Labs   Lab Result Units 07/28/24  0010   Vancomycin-Trough ug/mL 35.0*       Pharmacy will continue to follow and monitor vancomycin.    Please contact pharmacy at extension [phone] for questions regarding this assessment.    Thank you for the consult,   Edilia Vuong       Patient brief summary:  Saji Castañeda is a 74 y.o. male initiated on antimicrobial therapy with IV Vancomycin for treatment of bone/joint infection    The patient's current regimen is pulse dosing to resume when vancomycin random level less than 20    Drug Allergies:   Review of patient's allergies indicates:  No Known Allergies    Actual Body Weight:   78.8 kg    Renal Function:   Estimated Creatinine Clearance: 50.7 mL/min (based on SCr of 1.3 mg/dL).,     Dialysis Method (if applicable):  N/A    CBC (last 72 hours):  Recent Labs   Lab Result Units 07/25/24  1117 07/25/24 2115 07/26/24  0309 07/27/24  0325   WBC K/uL 9.92  --  10.93 16.18*   Hemoglobin g/dL 8.8*  --  9.3* 10.4*   Hemoglobin A1C %  --  6.0*  --   --    Hematocrit % 28.4*  --  30.7* 34.2*   Platelets K/uL 373  --  374 362   Gran % % 65.6  --  67.5 83.3*   Lymph % % 13.1*  --  11.2* 5.9*   Mono % % 6.8  --  7.4 3.5*   Eosinophil % % 13.6*  --  12.8* 6.7   Basophil % % 0.6  --  0.8 0.2   Differential Method  Automated  --  Automated Automated       Metabolic Panel (last 72 hours):  Recent Labs   Lab Result Units 07/25/24  1117 07/25/24  1257 07/25/24  1554 07/25/24  1716 07/25/24  2036 07/25/24 2115 07/26/24  0036 07/26/24  0309  07/27/24  0325 07/27/24  1328   Sodium mmol/L 146*  --   --   --  148*  --   --  147* 144 144   Sodium, Urine mmol/L  --   --   --   --   --   --  62  62  --   --   --    Potassium mmol/L 2.5*  --  2.4*  --  3.0*  --   --  3.2* 3.0* 3.3*   Potassium, Urine mmol/L  --   --   --   --   --   --  44  --   --   --    Chloride mmol/L 112*  --   --   --  115*  --   --  113* 109 110   CO2 mmol/L 24  --   --   --  25  --   --  23 23 24   Glucose mg/dL 125*  --   --   --  107  --   --  116* 108 133*   Glucose, UA   --  Negative  --  Negative  --   --   --   --   --   --    BUN mg/dL 18  --   --   --  18  --   --  18 17 17   Creatinine mg/dL 1.2  --   --   --  1.1  --   --  1.1 1.2 1.3   Creatinine, Urine mg/dL  --   --   --   --   --   --  59.7  --   --   --    Albumin g/dL 1.7*  --   --   --   --   --   --  1.7* 1.9*  --    Total Bilirubin mg/dL 0.3  --   --   --   --   --   --  0.3 0.4  --    Alkaline Phosphatase U/L 74  --   --   --   --   --   --  78 89  --    AST U/L 18  --   --   --   --   --   --  21 16  --    ALT U/L 12  --   --   --   --   --   --  10 9*  --    Magnesium mg/dL 1.8  --   --   --   --   --   --  2.0 2.3  --    Phosphorus mg/dL  --   --   --   --   --  2.1*  --  2.2* 2.5*  --        Vancomycin Administrations:  vancomycin given in the last 96 hours                     vancomycin 1,500 mg in D5W 250 mL IVPB (Vial-Mate) (mg) 1,500 mg New Bag 07/27/24 0156    vancomycin 2 g in dextrose 5 % 500 mL IVPB (mg) 2,000 mg New Bag 07/26/24 0255                    Microbiologic Results:  Microbiology Results (last 7 days)       Procedure Component Value Units Date/Time    Blood culture [[id number]] Collected: 07/25/24 2115    Order Status: Completed Specimen: Blood Updated: 07/27/24 1412     Blood Culture, Routine No Growth to date      No Growth to date    Blood culture [[id number]] Collected: 07/25/24 2114    Order Status: Completed Specimen: Blood from Peripheral, Hand, Left Updated: 07/27/24 1412     Blood  Culture, Routine No Growth to date      No Growth to date    Urine culture [[id number]] Collected: 07/25/24 1257    Order Status: Completed Specimen: Urine from Clean Catch Updated: 07/26/24 2231     Urine Culture, Routine No growth    Urine culture [[id number]]     Order Status: Completed Specimen: Urine

## 2024-07-28 NOTE — PLAN OF CARE
Problem: Adult Inpatient Plan of Care  Goal: Plan of Care Review  Outcome: Progressing  Goal: Patient-Specific Goal (Individualized)  Outcome: Progressing  Goal: Absence of Hospital-Acquired Illness or Injury  Outcome: Progressing  Goal: Optimal Comfort and Wellbeing  Outcome: Progressing  Goal: Readiness for Transition of Care  Outcome: Progressing     Problem: Diabetes Comorbidity  Goal: Blood Glucose Level Within Targeted Range  Outcome: Progressing     Problem: Infection  Goal: Absence of Infection Signs and Symptoms  Outcome: Progressing     Problem: Wound  Goal: Optimal Coping  Outcome: Progressing  Goal: Optimal Functional Ability  Outcome: Progressing  Goal: Absence of Infection Signs and Symptoms  Outcome: Progressing  Goal: Improved Oral Intake  Outcome: Progressing  Goal: Optimal Pain Control and Function  Outcome: Progressing  Goal: Skin Health and Integrity  Outcome: Progressing  Goal: Optimal Wound Healing  Outcome: Progressing     Problem: Comorbidity Management  Goal: Blood Pressure in Desired Range  Outcome: Progressing     Problem: Skin Injury Risk Increased  Goal: Skin Health and Integrity  Outcome: Progressing     Problem: Fall Injury Risk  Goal: Absence of Fall and Fall-Related Injury  Outcome: Progressing     Problem: Confusion Acute  Goal: Optimal Cognitive Function  Outcome: Progressing     Problem: UTI (Urinary Tract Infection)  Goal: Improved Infection Symptoms  Outcome: Progressing     Problem: Electrolyte Imbalance  Goal: Electrolyte Balance  Outcome: Progressing

## 2024-07-28 NOTE — PLAN OF CARE
Outside Transfer Acceptance Note / Regional Referral Center    Referring facility: \[facility]\""   Referring provider: CAITLYN GOMEZ  Accepting facility: [facility]  Accepting provider: Serafin GOMEZ  Admitting provider: TBRAY  Reason for transfer:    Transfer diagnosis:   Transfer specialty requested: Hospital Medicine  Cardiology  MRI  Transfer specialty notified: Yes  Transfer level: NUMBER 1-5: 2  Bed type requested: Med-tele  Isolation status: Contact   Admission class or status: IP- Inpatient  IP- Inpatient      Narrative     74M NHR T2DM on long-term insulin, PVD with diabetic ulcer s/p left AKA (3/27/2024), chronic multifactorial anemia, BPH s/p chronic fung, pAF on eliquis, CHB s/p PPM (10/12/2023), HTN, HLD, osteomyelitis of R foot on vanc/cefepime through 8/12 currently at [facility] since 7/25 for evaluation of altered mental status. Per nursing home staff, patient usually oriented x4 and is conversational, able to feed himself on his own. However, now presenting with expressive aphasia as well as tremors to bilateral upper extremities. Labs remarkable for potassium of 2.5, Na 146, Cl 112, , Ua with 3+ LE and 44 WBC. CT Head with no acute abnormalities. EKG with new LBBB and QT prolongation. LBBB can be seen with pacemaker ventricular pacing. Patient admitted for work up and management of acute encephalopathy and treatment of electrolyte imbalance. Mental status still not improving with correction of electrolytes. Consulted Neurology, recommending MRI brain and c-spine, consideration for EEG. Nephrology consulted for electrolyte replacement recommendations. ID consulted for abx management - rec hold cefepime due to potential neurologic SE and c/w vanc; possible to add zosyn once neurologic status improves. Due to PPM, patient will need to go to [facility] for imagine.    Of note, patient recently admitted to hospital and discharged on 7/11/2024 for chest pain and shortness of  breath. Was found to be anemic to 6.3 and received blood transfusions. EGD performed at this time showed erosive gastropathy. During this admission as well, he had a bone culture of right foot positive for Pseudomonas aeruginosa and MRSA. Patient was started on vancomycin and cefepime on discharge with end date of 8/12. Patient has been given his medications since at his nursing facility and remained at baseline until this morning.    Objective     Vitals: Temp: 99.1 °F (37.3 °C) (07/28/24 1303)  Pulse: 88 (07/28/24 1303)  Resp: 16 (07/28/24 1303)  BP: (!) 168/78 (07/28/24 1303)  SpO2: 100 % (07/28/24 1303)  Recent Labs: All pertinent labs within the past 24 hours have been reviewed.  Recent imaging: See above   Airway:     Vent settings:         IV access:    Infusions: See above  Allergies: Review of patient's allergies indicates:  No Known Allergies   NPO: No    Anticoagulation:   Anticoagulants       Ordered     Route Frequency Start Stop    07/25/24 1931  apixaban         Oral 2 times daily 07/25/24 2100 --             Instructions      Aaron Berg-  Admit to Hospital Medicine  Upon patient arrival to floor, please send SecureChat to [facility] or call extension [phone] (if no answer, do NOT leave a callback number after the beep, rather please send a SecureChat to [facility]), for Hospital Medicine admit team assignment and for additional admit orders for the patient.  Do not page the attending physician associated with the patient on arrival (this physician may not be on duty at the time of arrival).  Rather, always send a SecureChat to [facility] or call [phone] to reach the triage physician for orders and team assignment.

## 2024-07-28 NOTE — PROGRESS NOTES
[facility] Medicine Progress Note    Primary Team: [facility] Hospitalist Team B  Attending Physician: Vinod Elise MD  Resident: Narciso Salazar DO  Intern: Blake Naranjo MD & Pippa Lai DO    Subjective:      Pt with improved speech this morning. Able to speak some sentences, but still experiencing word finding difficulty. Intermittently experiencing whole body tremors. Pt endorsing mild intermittent pain in his RLE.     Objective:     Last 24 Hour Vital Signs:  BP  Min: 127/62  Max: 177/79  Temp  Av.5 °F (36.4 °C)  Min: 97.2 °F (36.2 °C)  Max: 98 °F (36.7 °C)  Pulse  Av.1  Min: 63  Max: 86  Resp  Av.4  Min: 16  Max: 18  SpO2  Av.3 %  Min: 95 %  Max: 100 %  I/O last 3 completed shifts:  In: 360 [P.O.:360]  Out: 1851 [Urine:1850; Stool:1]    Physical Examination:  Physical Exam  Constitutional:       Appearance: He is ill-appearing.   Eyes:      Extraocular Movements: Extraocular movements intact.   Cardiovascular:      Rate and Rhythm: Normal rate.      Pulses: Normal pulses.      Heart sounds: No murmur heard.  Pulmonary:      Effort: Pulmonary effort is normal. No respiratory distress.   Abdominal:      Tenderness: There is no abdominal tenderness.   Musculoskeletal:      Comments: Left AKA. Right foot with stage 4 ulceration positive osteomyelitis. Wrapped and has boot in place. Stage 4 sacral ulcer present. Images in media.    Skin:     Comments: Dry skin to chest and back with multiple excoriations from scratching.    Neurological:      Mental Status: He is alert.      Comments: Pt more interactive and able to verbalize more today compared to yesterday. Still experiencing intermittent word finding difficulty.       Laboratory:  Recent Labs   Lab 24  1117 24  1554 24  0309 24  0325 24  1328   WBC 9.92  --  10.93 16.18*  --    HGB 8.8*  --  9.3* 10.4*  --    HCT 28.4*  --  30.7* 34.2*  --      --  374 362  --    MCV 85  --  87 86  --    RDW 18.5*  --  18.7*  18.6*  --    *   < > 147* 144 144   K 2.5*   < > 3.2* 3.0* 3.3*   *   < > 113* 109 110   CO2 24   < > 23 23 24   BUN 18   < > 18 17 17   CREATININE 1.2   < > 1.1 1.2 1.3   *   < > 116* 108 133*   PROT 7.1  --  7.0 7.9  --    ALBUMIN 1.7*  --  1.7* 1.9*  --    BILITOT 0.3  --  0.3 0.4  --    AST 18  --  21 16  --    ALKPHOS 74  --  78 89  --    ALT 12  --  10 9*  --     < > = values in this interval not displayed.     Laboratory Data Reviewed:  Pertinent Findings:  Phos 2.5  K 3.2 -> 3  Na 147 -> 144    Microbiology Data Reviewed:  Pertinent Findings:  BCx x2 pending    Other Results:  EKG (my interpretation): EKG with wide QRS rhythm at rate 87. LBBB. no acute ST segment or T wave changes.     Radiology:  CT Head 7/25/24  Impression:  No CT evidence of acute intracranial abnormality. Clinical correlation and further evaluation as warranted.  Chronic senescent and microvascular ischemic change.     Xray Chest 7/25/24  Impression:  Stable chest with no acute findings.    US LE R Arteries 7/27/24  Impression:  The anterior tibial artery is patent, but demonstrates evidence of moderate proximal focal stenosis.      Current Medications:     Infusions:       Scheduled:   amLODIPine  5 mg Oral Daily    apixaban  5 mg Oral BID    gabapentin  100 mg Oral BID    insulin glargine U-100 (Lantus)  6 Units Subcutaneous BID    mupirocin   Nasal BID    pantoprazole  40 mg Oral Daily    potassium chloride  40 mEq Oral Once    tamsulosin  0.4 mg Oral Daily    white petrolatum   Topical (Top) Daily        PRN:    Current Facility-Administered Medications:     acetaminophen, 650 mg, Oral, Q6H PRN    dextrose 10%, 12.5 g, Intravenous, PRN    dextrose 10%, 25 g, Intravenous, PRN    glucagon (human recombinant), 1 mg, Intramuscular, PRN    glucose, 16 g, Oral, PRN    glucose, 24 g, Oral, PRN    insulin aspart U-100, 0-5 Units, Subcutaneous, QID (AC + HS) PRN    naloxone, 0.02 mg, Intravenous, PRN    oxyCODONE, 5 mg,  Oral, Q6H PRN    sodium chloride 0.9%, 10 mL, Intravenous, Q12H PRN    Pharmacy to dose Vancomycin consult, , , Once **AND** vancomycin - pharmacy to dose, , Intravenous, pharmacy to manage frequency    Antibiotics and Day Number of Therapy:  Vancomycin IV    Lines and Day Number of Therapy:  PICC  Urethral catheter    Assessment:     Saji Castañdea is a 74 y.o. Male with a PMH of T2DM on long-term insulin, PVD with diabetic ulcer s/p left AKA (3/27/2024), Chronic Multifactorial Anemia, BPH s/p Chronic Sams, paroxysmal A-fib on eliquis, CHB s/p PPM (10/12/2023), Multifactorial HTN, and HLD Osteomyelitis of R foot who presented to ED per nursing home for decreased mental status for 1 day. Per nursing home staff, patient usually oriented x4 and is conversational, able to feed himself on his own. However, now presenting with expressive aphasia as well as tremors to bilateral upper extremities. Labs remarkable for potassium of 2.5, Na 146, Cl 112, , Ua with 3+ Leukocyte Esterase and 44 WBC. CT Head with no acute abnormalities. EKG with new LBBB and QT prolongation. LBBB can be seen with pacemaker ventricular pacing. Patient admitted for work up and management of decreased mental status and treatment of electrolyte imbalance. Mental status still not improving with correction of electrolytes. Consulted Neurology, recommending MRI brain and c-spine. Nephrology consulted for electrolyte replacement recommendations. ID consulted for abx management. Pending MRI studies at [facility] d/t pacemaker.      Plan:     #Expressive aphasia  #Acute Encephalopathy  - patient is a/o x4 at baseline, but now presenting with aphasia and bilateral upper extremity tremors - tremors resolving  - CT Head with no acute abnormalities  - mental status still not improving with correction of electrolytes  - Neurology consulted: recommend MRI brain and c-spine  Plan  - Speaking with [facility] about transport for MRI, will call today for  imaging tomorrow  - Could be related to outpatient antibiotic regimen, Cefepime discontinued 7/26  - Appreciate Neurology recs     #Electrolyte imbalances, resolved  - potassium 2.5, phos 2.1, and sodium 146 upon admission  - Electrolytes repleted  - Nephrology consulted, appreciate recs  Plan  - monitor with daily CMP  - replace electrolytes PRN  - Restarting home Lasix PO 20 daily    #Osteomyelitis of right lower extremity  - Heel osteomyelitis with MRSA and pseudomonas, diagnosed last month  - Pt discharged on Vancomycin and Cefepime  - US LE R arteries (7/27): CHRISTIAN is patent, moderate proximal focal stenosis - adequate blood flow for healing  Plan  - ID consulted; discontinuing Cefepime and will let it wash out - could be contributing to change in mental status  - Plan to initiate Zosyn once neurologic status improved and will continue with Vancomycin and Zosyn to complete abx course  - Wound care and Podiatry consulted, appreciate recs     #Paroxysmal Afib  #Complete Heart block s/p Pacemaker  - EKG with wide complex QRS, new LBBB which can be seen in patients with pacemaker that is ventricular paced, repeat EKG (7/26): wide complex QRS no longer present, ventricular-paced rhythm  - on home Eliquis 5 mg BID  - TTE (7/26): EF 55-60%, concentric hypertrophy, septal motion consistent with bundle branch block  Plan  - Continue home Apixaban      #Essential Hypertension  - /70s on admission  - home medications include nifedipine XL 90 mg  Plan  - Daily Amlodipine 5 (Nifedipine cannot be crushed), can adjust dose as needed     #Insulin dependent Type 2 Diabetes Mellitus  - repeat A1c 6 (6.8 one month ago)  - home medications include Lantus 12u BID/Novolog 5u with meals  Plan  - On Lantus 6u  - on SSI  - Blood glucose goal: 140 - 180, has been at goal or close to goal during admission     #Anemia of chronic disease  - hgb 8.8 upon admission, improving, at baseline  - Iron studies 6/27/24  Fe 46  Transferrin  72  TIBC 107  Sat 43  Ferritin 697  Plan  - Transfuse if <7  - Daily CBCs     #BPH  #Chronic Indwelling Fung  - on tamsulosin 0.4 mg  - Ua with 1+ LE and 44 WBC. Chronically infected  - no need to treat with abx due to chronic fung. Likely colonization      Diet: diabetic  DVT: eliquis  Dispo: admit to medicine for management of electrolyte abnormalities and acute encephalopathy. Pending MRI studies.      Code Status:      Full code    Narciso Salazar DO  [facility] Internal Medicine HO-II    [facility] Medicine Hospitalist Pager numbers:   [facility] Hospitalist Medicine Team A (Lorne/Riki): [phone]  [facility] Hospitalist Medicine Team B (Arik/Lucio):  [phone]

## 2024-07-29 ENCOUNTER — HOSPITAL ENCOUNTER (INPATIENT)
Facility: HOSPITAL | Age: 74
LOS: 4 days | Discharge: ANOTHER HOSPITAL | DRG: 091 | End: 2024-08-02
Attending: HOSPITALIST | Admitting: INTERNAL MEDICINE
Payer: MEDICARE

## 2024-07-29 VITALS
HEART RATE: 89 BPM | BODY MASS INDEX: 24.87 KG/M2 | HEIGHT: 70 IN | WEIGHT: 173.75 LBS | OXYGEN SATURATION: 98 % | RESPIRATION RATE: 18 BRPM | DIASTOLIC BLOOD PRESSURE: 66 MMHG | SYSTOLIC BLOOD PRESSURE: 137 MMHG | TEMPERATURE: 98 F

## 2024-07-29 DIAGNOSIS — N17.9 AKI (ACUTE KIDNEY INJURY): Primary | ICD-10-CM

## 2024-07-29 DIAGNOSIS — R07.9 CHEST PAIN: ICD-10-CM

## 2024-07-29 DIAGNOSIS — G93.40 ACUTE ENCEPHALOPATHY: ICD-10-CM

## 2024-07-29 LAB
ALBUMIN SERPL BCP-MCNC: 1.5 G/DL (ref 3.5–5.2)
ALP SERPL-CCNC: 69 U/L (ref 55–135)
ALT SERPL W/O P-5'-P-CCNC: 9 U/L (ref 10–44)
ANION GAP SERPL CALC-SCNC: 7 MMOL/L (ref 8–16)
AST SERPL-CCNC: 14 U/L (ref 10–40)
BASOPHILS # BLD AUTO: 0.03 K/UL (ref 0–0.2)
BASOPHILS NFR BLD: 0.3 % (ref 0–1.9)
BILIRUB SERPL-MCNC: 0.2 MG/DL (ref 0.1–1)
BUN SERPL-MCNC: 33 MG/DL (ref 8–23)
CALCIUM SERPL-MCNC: 8.6 MG/DL (ref 8.7–10.5)
CHLORIDE SERPL-SCNC: 110 MMOL/L (ref 95–110)
CO2 SERPL-SCNC: 24 MMOL/L (ref 23–29)
CREAT SERPL-MCNC: 1.3 MG/DL (ref 0.5–1.4)
DIFFERENTIAL METHOD BLD: ABNORMAL
EOSINOPHIL # BLD AUTO: 1.2 K/UL (ref 0–0.5)
EOSINOPHIL NFR BLD: 13.2 % (ref 0–8)
ERYTHROCYTE [DISTWIDTH] IN BLOOD BY AUTOMATED COUNT: 18.3 % (ref 11.5–14.5)
EST. GFR  (NO RACE VARIABLE): 57 ML/MIN/1.73 M^2
GLUCOSE SERPL-MCNC: 115 MG/DL (ref 70–110)
HCT VFR BLD AUTO: 26.6 % (ref 40–54)
HGB BLD-MCNC: 8.2 G/DL (ref 14–18)
IMM GRANULOCYTES # BLD AUTO: 0.03 K/UL (ref 0–0.04)
IMM GRANULOCYTES NFR BLD AUTO: 0.3 % (ref 0–0.5)
LYMPHOCYTES # BLD AUTO: 1.3 K/UL (ref 1–4.8)
LYMPHOCYTES NFR BLD: 14.2 % (ref 18–48)
MAGNESIUM SERPL-MCNC: 1.9 MG/DL (ref 1.6–2.6)
MCH RBC QN AUTO: 26.3 PG (ref 27–31)
MCHC RBC AUTO-ENTMCNC: 30.8 G/DL (ref 32–36)
MCV RBC AUTO: 85 FL (ref 82–98)
MONOCYTES # BLD AUTO: 0.7 K/UL (ref 0.3–1)
MONOCYTES NFR BLD: 7.7 % (ref 4–15)
NEUTROPHILS # BLD AUTO: 5.8 K/UL (ref 1.8–7.7)
NEUTROPHILS NFR BLD: 64.3 % (ref 38–73)
NRBC BLD-RTO: 0 /100 WBC
PHOSPHATE SERPL-MCNC: 2.5 MG/DL (ref 2.7–4.5)
PLATELET # BLD AUTO: 298 K/UL (ref 150–450)
PMV BLD AUTO: 9.4 FL (ref 9.2–12.9)
POCT GLUCOSE: 114 MG/DL (ref 70–110)
POCT GLUCOSE: 129 MG/DL (ref 70–110)
POCT GLUCOSE: 94 MG/DL (ref 70–110)
POTASSIUM SERPL-SCNC: 3.3 MMOL/L (ref 3.5–5.1)
PROT SERPL-MCNC: 5.9 G/DL (ref 6–8.4)
RBC # BLD AUTO: 3.12 M/UL (ref 4.6–6.2)
SODIUM SERPL-SCNC: 141 MMOL/L (ref 136–145)
VANCOMYCIN SERPL-MCNC: 20.8 UG/ML
VANCOMYCIN SERPL-MCNC: 25.1 UG/ML
VANCOMYCIN SERPL-MCNC: 27.6 UG/ML
WBC # BLD AUTO: 9.07 K/UL (ref 3.9–12.7)

## 2024-07-29 PROCEDURE — 20600001 HC STEP DOWN PRIVATE ROOM

## 2024-07-29 PROCEDURE — 85025 COMPLETE CBC W/AUTO DIFF WBC: CPT

## 2024-07-29 PROCEDURE — 25000003 PHARM REV CODE 250: Performed by: INTERNAL MEDICINE

## 2024-07-29 PROCEDURE — 83735 ASSAY OF MAGNESIUM: CPT

## 2024-07-29 PROCEDURE — 63600175 PHARM REV CODE 636 W HCPCS

## 2024-07-29 PROCEDURE — 84100 ASSAY OF PHOSPHORUS: CPT

## 2024-07-29 PROCEDURE — 63600175 PHARM REV CODE 636 W HCPCS: Performed by: INTERNAL MEDICINE

## 2024-07-29 PROCEDURE — 80202 ASSAY OF VANCOMYCIN: CPT | Mod: 91 | Performed by: INTERNAL MEDICINE

## 2024-07-29 PROCEDURE — 36415 COLL VENOUS BLD VENIPUNCTURE: CPT | Performed by: INTERNAL MEDICINE

## 2024-07-29 PROCEDURE — 27000207 HC ISOLATION

## 2024-07-29 PROCEDURE — 25000003 PHARM REV CODE 250

## 2024-07-29 PROCEDURE — 80053 COMPREHEN METABOLIC PANEL: CPT

## 2024-07-29 PROCEDURE — 80202 ASSAY OF VANCOMYCIN: CPT | Performed by: INTERNAL MEDICINE

## 2024-07-29 RX ORDER — GLUCAGON 1 MG
1 KIT INJECTION
Status: DISCONTINUED | OUTPATIENT
Start: 2024-07-29 | End: 2024-08-02 | Stop reason: HOSPADM

## 2024-07-29 RX ORDER — DIPHENHYDRAMINE HCL 25 MG
25 CAPSULE ORAL EVERY 6 HOURS PRN
Status: DISCONTINUED | OUTPATIENT
Start: 2024-07-30 | End: 2024-08-02 | Stop reason: HOSPADM

## 2024-07-29 RX ORDER — SODIUM CHLORIDE 0.9 % (FLUSH) 0.9 %
10 SYRINGE (ML) INJECTION EVERY 12 HOURS PRN
Status: DISCONTINUED | OUTPATIENT
Start: 2024-07-29 | End: 2024-08-02 | Stop reason: HOSPADM

## 2024-07-29 RX ORDER — ACETAMINOPHEN 325 MG/1
650 TABLET ORAL EVERY 4 HOURS PRN
Status: DISCONTINUED | OUTPATIENT
Start: 2024-07-29 | End: 2024-08-02 | Stop reason: HOSPADM

## 2024-07-29 RX ORDER — POTASSIUM CHLORIDE 20 MEQ/1
40 TABLET, EXTENDED RELEASE ORAL 2 TIMES DAILY
Status: DISCONTINUED | OUTPATIENT
Start: 2024-07-29 | End: 2024-07-31

## 2024-07-29 RX ORDER — AMLODIPINE BESYLATE 5 MG/1
5 TABLET ORAL DAILY
Status: DISCONTINUED | OUTPATIENT
Start: 2024-07-30 | End: 2024-08-02 | Stop reason: HOSPADM

## 2024-07-29 RX ORDER — POTASSIUM CHLORIDE 20 MEQ/1
40 TABLET, EXTENDED RELEASE ORAL 2 TIMES DAILY
Status: DISCONTINUED | OUTPATIENT
Start: 2024-07-29 | End: 2024-07-29 | Stop reason: HOSPADM

## 2024-07-29 RX ORDER — NALOXONE HCL 0.4 MG/ML
0.02 VIAL (ML) INJECTION
Status: DISCONTINUED | OUTPATIENT
Start: 2024-07-29 | End: 2024-08-02 | Stop reason: HOSPADM

## 2024-07-29 RX ORDER — GABAPENTIN 100 MG/1
100 CAPSULE ORAL 2 TIMES DAILY
Status: DISCONTINUED | OUTPATIENT
Start: 2024-07-29 | End: 2024-08-02 | Stop reason: HOSPADM

## 2024-07-29 RX ORDER — IBUPROFEN 200 MG
16 TABLET ORAL
Status: DISCONTINUED | OUTPATIENT
Start: 2024-07-29 | End: 2024-08-02 | Stop reason: HOSPADM

## 2024-07-29 RX ORDER — INSULIN GLARGINE 100 [IU]/ML
6 INJECTION, SOLUTION SUBCUTANEOUS 2 TIMES DAILY
Status: DISCONTINUED | OUTPATIENT
Start: 2024-07-29 | End: 2024-08-02 | Stop reason: HOSPADM

## 2024-07-29 RX ORDER — MUPIROCIN 20 MG/G
OINTMENT TOPICAL 2 TIMES DAILY
Status: COMPLETED | OUTPATIENT
Start: 2024-07-29 | End: 2024-07-31

## 2024-07-29 RX ORDER — ERGOCALCIFEROL 1.25 MG/1
50000 CAPSULE ORAL
Status: ON HOLD
Start: 2024-08-02 | End: 2024-10-05

## 2024-07-29 RX ORDER — SODIUM,POTASSIUM PHOSPHATES 280-250MG
1 POWDER IN PACKET (EA) ORAL
Status: DISCONTINUED | OUTPATIENT
Start: 2024-07-29 | End: 2024-07-29 | Stop reason: HOSPADM

## 2024-07-29 RX ORDER — TALC
6 POWDER (GRAM) TOPICAL NIGHTLY PRN
Status: DISCONTINUED | OUTPATIENT
Start: 2024-07-29 | End: 2024-08-02 | Stop reason: HOSPADM

## 2024-07-29 RX ORDER — ACETAMINOPHEN 325 MG/1
650 TABLET ORAL EVERY 6 HOURS PRN
Status: DISCONTINUED | OUTPATIENT
Start: 2024-07-29 | End: 2024-07-29

## 2024-07-29 RX ORDER — PANTOPRAZOLE SODIUM 40 MG/1
40 TABLET, DELAYED RELEASE ORAL DAILY
Status: DISCONTINUED | OUTPATIENT
Start: 2024-07-30 | End: 2024-08-02 | Stop reason: HOSPADM

## 2024-07-29 RX ORDER — INSULIN ASPART 100 [IU]/ML
0-5 INJECTION, SOLUTION INTRAVENOUS; SUBCUTANEOUS
Status: DISCONTINUED | OUTPATIENT
Start: 2024-07-29 | End: 2024-08-02 | Stop reason: HOSPADM

## 2024-07-29 RX ORDER — MAGNESIUM SULFATE HEPTAHYDRATE 40 MG/ML
2 INJECTION, SOLUTION INTRAVENOUS ONCE
Status: COMPLETED | OUTPATIENT
Start: 2024-07-29 | End: 2024-07-29

## 2024-07-29 RX ORDER — OXYCODONE HYDROCHLORIDE 5 MG/1
5 TABLET ORAL EVERY 6 HOURS PRN
Status: DISCONTINUED | OUTPATIENT
Start: 2024-07-29 | End: 2024-08-02 | Stop reason: HOSPADM

## 2024-07-29 RX ORDER — AMOXICILLIN 250 MG
1 CAPSULE ORAL 2 TIMES DAILY
Status: DISCONTINUED | OUTPATIENT
Start: 2024-07-29 | End: 2024-08-02 | Stop reason: HOSPADM

## 2024-07-29 RX ORDER — IBUPROFEN 200 MG
24 TABLET ORAL
Status: DISCONTINUED | OUTPATIENT
Start: 2024-07-29 | End: 2024-08-02 | Stop reason: HOSPADM

## 2024-07-29 RX ORDER — NALOXONE HCL 0.4 MG/ML
0.02 VIAL (ML) INJECTION
Status: DISCONTINUED | OUTPATIENT
Start: 2024-07-29 | End: 2024-07-29

## 2024-07-29 RX ORDER — TAMSULOSIN HYDROCHLORIDE 0.4 MG/1
0.4 CAPSULE ORAL DAILY
Status: DISCONTINUED | OUTPATIENT
Start: 2024-07-30 | End: 2024-08-02 | Stop reason: HOSPADM

## 2024-07-29 RX ORDER — SODIUM,POTASSIUM PHOSPHATES 280-250MG
1 POWDER IN PACKET (EA) ORAL
Status: DISCONTINUED | OUTPATIENT
Start: 2024-07-29 | End: 2024-07-31

## 2024-07-29 RX ADMIN — INSULIN GLARGINE 6 UNITS: 100 INJECTION, SOLUTION SUBCUTANEOUS at 08:07

## 2024-07-29 RX ADMIN — POTASSIUM & SODIUM PHOSPHATES POWDER PACK 280-160-250 MG 1 PACKET: 280-160-250 PACK at 08:07

## 2024-07-29 RX ADMIN — POTASSIUM CHLORIDE 40 MEQ: 1500 TABLET, EXTENDED RELEASE ORAL at 08:07

## 2024-07-29 RX ADMIN — MAGNESIUM SULFATE HEPTAHYDRATE 2 G: 40 INJECTION, SOLUTION INTRAVENOUS at 08:07

## 2024-07-29 RX ADMIN — APIXABAN 5 MG: 5 TABLET, FILM COATED ORAL at 08:07

## 2024-07-29 RX ADMIN — AMLODIPINE BESYLATE 5 MG: 5 TABLET ORAL at 08:07

## 2024-07-29 RX ADMIN — GABAPENTIN 100 MG: 100 CAPSULE ORAL at 08:07

## 2024-07-29 RX ADMIN — MUPIROCIN: 20 OINTMENT TOPICAL at 08:07

## 2024-07-29 RX ADMIN — PANTOPRAZOLE SODIUM 40 MG: 40 TABLET, DELAYED RELEASE ORAL at 08:07

## 2024-07-29 RX ADMIN — TAMSULOSIN HYDROCHLORIDE 0.4 MG: 0.4 CAPSULE ORAL at 08:07

## 2024-07-29 RX ADMIN — SENNOSIDES AND DOCUSATE SODIUM 1 TABLET: 8.6; 5 TABLET ORAL at 08:07

## 2024-07-29 RX ADMIN — DIPHENHYDRAMINE HYDROCHLORIDE 25 MG: 25 CAPSULE ORAL at 11:07

## 2024-07-29 RX ADMIN — WHITE PETROLATUM: 1.75 OINTMENT TOPICAL at 08:07

## 2024-07-29 RX ADMIN — POTASSIUM & SODIUM PHOSPHATES POWDER PACK 280-160-250 MG 1 PACKET: 280-160-250 PACK at 05:07

## 2024-07-29 RX ADMIN — FUROSEMIDE 20 MG: 20 TABLET ORAL at 08:07

## 2024-07-29 NOTE — PLAN OF CARE
Problem: Adult Inpatient Plan of Care  Goal: Plan of Care Review  Outcome: Progressing  Goal: Absence of Hospital-Acquired Illness or Injury  Outcome: Progressing  Goal: Optimal Comfort and Wellbeing  Outcome: Progressing     Problem: Diabetes Comorbidity  Goal: Blood Glucose Level Within Targeted Range  Outcome: Progressing     Problem: Infection  Goal: Absence of Infection Signs and Symptoms  Outcome: Progressing     Problem: Wound  Goal: Optimal Coping  Outcome: Progressing  Goal: Absence of Infection Signs and Symptoms  Outcome: Progressing  Goal: Improved Oral Intake  Outcome: Progressing  Goal: Optimal Pain Control and Function  Outcome: Progressing  Goal: Skin Health and Integrity  Outcome: Progressing  Goal: Optimal Wound Healing  Outcome: Progressing     Problem: Comorbidity Management  Goal: Blood Pressure in Desired Range  Outcome: Progressing     Problem: Skin Injury Risk Increased  Goal: Skin Health and Integrity  Outcome: Progressing     Problem: Fall Injury Risk  Goal: Absence of Fall and Fall-Related Injury  Outcome: Progressing     Problem: Confusion Acute  Goal: Optimal Cognitive Function  Outcome: Progressing     Problem: Electrolyte Imbalance  Goal: Electrolyte Balance  Outcome: Progressing   POC reviewed with pt who verbalized understanding. AAOx2-3. VSS on RA. Resting between care. Tele monitored. BG monitored with no coverage needed per MAR. No complaints of pain or nausea. Wound care per orders. Sams in place. Remains free of falls and injury. Repositioned with assist. Bed alarm on, call light in reach, and rounds made for safety.

## 2024-07-29 NOTE — PLAN OF CARE
QUINCY sent updated information via [employer] to [facility] Phone: [phone]. Cm will continue to follow pt through transitions of care and assist with any discharge needs.    AMAYA Desai  [phone]     07/29/24 0857   Post-Acute Status   Post-Acute Authorization Placement   Discharge Plan   Discharge Plan A Return to nursing home

## 2024-07-29 NOTE — PROGRESS NOTES
Pharmacokinetic Assessment Follow Up: IV Vancomycin    Vancomycin serum concentration assessment(s):    The random level was drawn correctly and can be used to guide therapy at this time. The measurement is above the desired definitive target range of 15 to 20 mcg/mL.    Vancomycin Regimen Plan:    Re-dose when the random level is less than 20 mcg/mL, next level to be drawn at 0100 on 07/30/2024    Drug levels (last 3 results):  Recent Labs   Lab Result Units 07/28/24  0010 07/29/24  0103   Vancomycin, Random ug/mL  --  27.6   Vancomycin-Trough ug/mL 35.0*  --        Pharmacy will continue to follow and monitor vancomycin.    Please contact pharmacy at extension [phone] for questions regarding this assessment.    Thank you for the consult,   Edilia Vuong       Patient brief summary:  Saji Castañeda is a 74 y.o. male initiated on antimicrobial therapy with IV Vancomycin for treatment of bone/joint infection    The patient's current regimen is on hold- to resume when vancomycin level less than 20 mcg/ml    Drug Allergies:   Review of patient's allergies indicates:  No Known Allergies    Actual Body Weight:   78.8 kg    Renal Function:   Estimated Creatinine Clearance: 50.7 mL/min (based on SCr of 1.3 mg/dL).,     Dialysis Method (if applicable):  N/A    CBC (last 72 hours):  Recent Labs   Lab Result Units 07/26/24  0309 07/27/24  0325 07/28/24  0724 07/29/24  0103   WBC K/uL 10.93 16.18* 12.26 9.07   Hemoglobin g/dL 9.3* 10.4* 8.7* 8.2*   Hematocrit % 30.7* 34.2* 28.3* 26.6*   Platelets K/uL 374 362 345 298   Gran % % 67.5 83.3* 70.6 64.3   Lymph % % 11.2* 5.9* 9.5* 14.2*   Mono % % 7.4 3.5* 5.1 7.7   Eosinophil % % 12.8* 6.7 14.2* 13.2*   Basophil % % 0.8 0.2 0.3 0.3   Differential Method  Automated Automated Automated Automated       Metabolic Panel (last 72 hours):  Recent Labs   Lab Result Units 07/26/24  0309 07/27/24  0325 07/27/24  1328 07/28/24  0723 07/29/24  0103   Sodium mmol/L 147* 144 144 144 141    Potassium mmol/L 3.2* 3.0* 3.3* 3.5 3.3*   Chloride mmol/L 113* 109 110 110 110   CO2 mmol/L 23 23 24 25 24   Glucose mg/dL 116* 108 133* 102 115*   BUN mg/dL 18 17 17 22 33*   Creatinine mg/dL 1.1 1.2 1.3 1.3 1.3   Albumin g/dL 1.7* 1.9*  --  1.7* 1.5*   Total Bilirubin mg/dL 0.3 0.4  --  0.2 0.2   Alkaline Phosphatase U/L 78 89  --  82 69   AST U/L 21 16  --  14 14   ALT U/L 10 9*  --  10 9*   Magnesium mg/dL 2.0 2.3  --  2.0 1.9   Phosphorus mg/dL 2.2* 2.5*  --  2.8 2.5*       Vancomycin Administrations:  vancomycin given in the last 96 hours                     vancomycin 1,500 mg in D5W 250 mL IVPB (Vial-Mate) (mg) 1,500 mg New Bag 07/27/24 0156    vancomycin 2 g in dextrose 5 % 500 mL IVPB (mg) 2,000 mg New Bag 07/26/24 0255                    Microbiologic Results:  Microbiology Results (last 7 days)       Procedure Component Value Units Date/Time    Blood culture [[id number]] Collected: 07/25/24 2114    Order Status: Completed Specimen: Blood from Peripheral, Hand, Left Updated: 07/28/24 1412     Blood Culture, Routine No Growth to date      No Growth to date      No Growth to date    Blood culture [[id number]] Collected: 07/25/24 2115    Order Status: Completed Specimen: Blood Updated: 07/28/24 1412     Blood Culture, Routine No Growth to date      No Growth to date      No Growth to date    Urine culture [[id number]] Collected: 07/25/24 1257    Order Status: Completed Specimen: Urine from Clean Catch Updated: 07/26/24 2231     Urine Culture, Routine No growth    Urine culture [[id number]]     Order Status: Completed Specimen: Urine

## 2024-07-29 NOTE — PROGRESS NOTES
[facility] Medicine Progress Note    Primary Team: [facility] Hospitalist Team B  Attending Physician: Vinod Elise MD  Resident: Dirk Condon MD  Intern: Blake Naranjo MD & Pippa Lai DO    Subjective:      Vitals stable with BP in 120-130s overnight. Afebrile. Doing well this AM. Patient with improved speech and communications, however still showing some complications with word finding. Denies any fevers, chills, chest pain, shortness of breath.     Pending transfer to [facility] for MRI compatibility with PPM.    Objective:     Last 24 Hour Vital Signs:  BP  Min: 123/65  Max: 168/78  Temp  Av.9 °F (37.2 °C)  Min: 98.2 °F (36.8 °C)  Max: 99.1 °F (37.3 °C)  Pulse  Av.6  Min: 56  Max: 100  Resp  Av.3  Min: 16  Max: 18  SpO2  Av.8 %  Min: 97 %  Max: 100 %  I/O last 3 completed shifts:  In: -   Out: 2400 [Urine:2400]    Physical Examination:  Physical Exam  Constitutional:       Appearance: He is ill-appearing.   Eyes:      Extraocular Movements: Extraocular movements intact.   Cardiovascular:      Rate and Rhythm: Normal rate.      Pulses: Normal pulses.      Heart sounds: No murmur heard.  Pulmonary:      Effort: Pulmonary effort is normal. No respiratory distress.   Abdominal:      Tenderness: There is no abdominal tenderness.   Musculoskeletal:      Comments: Left AKA. Right foot with stage 4 ulceration positive osteomyelitis. Wrapped and has boot in place. Stage 4 sacral ulcer present. Images in media.    Skin:     Comments: Dry skin to chest and back with multiple excoriations from scratching.    Neurological:      Mental Status: He is alert.      Comments: Pt more interactive and able to verbalize more today compared to yesterday. Still experiencing intermittent word finding difficulty.       Laboratory:  Recent Labs   Lab 24  0325 24  1328 24  0723 24  0724 24  0103   WBC 16.18*  --   --  12.26 9.07   HGB 10.4*  --   --  8.7* 8.2*   HCT 34.2*  --    --  28.3* 26.6*     --   --  345 298   MCV 86  --   --  86 85   RDW 18.6*  --   --  18.2* 18.3*    144 144  --  141   K 3.0* 3.3* 3.5  --  3.3*    110 110  --  110   CO2 23 24 25  --  24   BUN 17 17 22  --  33*   CREATININE 1.2 1.3 1.3  --  1.3    133* 102  --  115*   PROT 7.9  --  6.9  --  5.9*   ALBUMIN 1.9*  --  1.7*  --  1.5*   BILITOT 0.4  --  0.2  --  0.2   AST 16  --  14  --  14   ALKPHOS 89  --  82  --  69   ALT 9*  --  10  --  9*     Laboratory Data Reviewed:  Pertinent Findings:  Phos 2.5  K 3.2 -> 3  Na 147 -> 144    Microbiology Data Reviewed:  Pertinent Findings:  BCx x2 pending    Other Results:  EKG (my interpretation): EKG with wide QRS rhythm at rate 87. LBBB. no acute ST segment or T wave changes.     Radiology:  CT Head 7/25/24  Impression:  No CT evidence of acute intracranial abnormality. Clinical correlation and further evaluation as warranted.  Chronic senescent and microvascular ischemic change.     Xray Chest 7/25/24  Impression:  Stable chest with no acute findings.    US LE R Arteries 7/27/24  Impression:  The anterior tibial artery is patent, but demonstrates evidence of moderate proximal focal stenosis.      Current Medications:     Infusions:       Scheduled:   amLODIPine  5 mg Oral Daily    apixaban  5 mg Oral BID    furosemide  20 mg Oral Daily    gabapentin  100 mg Oral BID    insulin glargine U-100 (Lantus)  6 Units Subcutaneous BID    mupirocin   Nasal BID    pantoprazole  40 mg Oral Daily    potassium chloride  40 mEq Oral Once    tamsulosin  0.4 mg Oral Daily    white petrolatum   Topical (Top) Daily        PRN:    Current Facility-Administered Medications:     acetaminophen, 650 mg, Oral, Q6H PRN    dextrose 10%, 12.5 g, Intravenous, PRN    dextrose 10%, 25 g, Intravenous, PRN    glucagon (human recombinant), 1 mg, Intramuscular, PRN    glucose, 16 g, Oral, PRN    glucose, 24 g, Oral, PRN    insulin aspart U-100, 0-5 Units, Subcutaneous, QID (AC + HS)  PRN    naloxone, 0.02 mg, Intravenous, PRN    oxyCODONE, 5 mg, Oral, Q6H PRN    sodium chloride 0.9%, 10 mL, Intravenous, Q12H PRN    Pharmacy to dose Vancomycin consult, , , Once **AND** vancomycin - pharmacy to dose, , Intravenous, pharmacy to manage frequency    Antibiotics and Day Number of Therapy:  Vancomycin IV    Lines and Day Number of Therapy:  PICC  Urethral catheter    Assessment:     Saji Castañeda is a 74 y.o. Male with a PMH of T2DM on long-term insulin, PVD with diabetic ulcer s/p left AKA (3/27/2024), Chronic Multifactorial Anemia, BPH s/p Chronic Sams, paroxysmal A-fib on eliquis, CHB s/p PPM (10/12/2023), Multifactorial HTN, and HLD Osteomyelitis of R foot who presented to ED per nursing home for decreased mental status for 1 day. Per nursing home staff, patient usually oriented x4 and is conversational, able to feed himself on his own. However, now presenting with expressive aphasia as well as tremors to bilateral upper extremities. Labs remarkable for potassium of 2.5, Na 146, Cl 112, , Ua with 3+ Leukocyte Esterase and 44 WBC. CT Head with no acute abnormalities. EKG with new LBBB and QT prolongation. LBBB can be seen with pacemaker ventricular pacing. Patient admitted for work up and management of decreased mental status and treatment of electrolyte imbalance. Mental status still not improving with correction of electrolytes. Consulted Neurology, recommending MRI brain and c-spine. Nephrology consulted for electrolyte replacement recommendations. ID consulted for abx management. Pending MRI studies at [facility] d/t pacemaker.      Plan:     #Expressive aphasia  #Acute Encephalopathy  - patient is a/o x4 at baseline, but now presenting with aphasia and bilateral upper extremity tremors - tremors resolving  - CT Head with no acute abnormalities  - mental status still not improving with correction of electrolytes  - Neurology consulted: recommend MRI brain and c-spine  Plan  - Pending  transport to [facility] for MRI.  - Could be related to outpatient antibiotic regimen, Cefepime discontinued 7/26  - Appreciate Neurology recs     #Electrolyte imbalances, resolved  - potassium 2.5, phos 2.1, and sodium 146 upon admission  - Electrolytes repleted  - Nephrology consulted, appreciate recs  Plan  - monitor with daily CMP  - replace electrolytes PRN  - Restarting home Lasix PO 20 daily    #Osteomyelitis of right lower extremity  - Heel osteomyelitis with MRSA and pseudomonas, diagnosed last month  - Pt discharged on Vancomycin and Cefepime  - US LE R arteries (7/27): CHRISTIAN is patent, moderate proximal focal stenosis - adequate blood flow for healing  Plan  - ID consulted; discontinuing Cefepime and will let it wash out - could be contributing to change in mental status  - Plan to initiate Zosyn once neurologic status improved and will continue with Vancomycin and Zosyn to complete abx course  - Wound care and Podiatry consulted, appreciate recs     #Paroxysmal Afib  #Complete Heart block s/p Pacemaker  - EKG with wide complex QRS, new LBBB which can be seen in patients with pacemaker that is ventricular paced, repeat EKG (7/26): wide complex QRS no longer present, ventricular-paced rhythm  - on home Eliquis 5 mg BID  - TTE (7/26): EF 55-60%, concentric hypertrophy, septal motion consistent with bundle branch block  Plan  - Continue home Apixaban      #Essential Hypertension  - /70s on admission  - home medications include nifedipine XL 90 mg  Plan  - Daily Amlodipine 5 (Nifedipine cannot be crushed), can adjust dose as needed     #Insulin dependent Type 2 Diabetes Mellitus  - repeat A1c 6 (6.8 one month ago)  - home medications include Lantus 12u BID/Novolog 5u with meals  Plan  - On Lantus 6u  - on SSI  - Blood glucose goal: 140 - 180, has been at goal or close to goal during admission     #Anemia of chronic disease  - hgb 8.8 upon admission, improving, at baseline  - Iron studies 6/27/24  Fe  46  Transferrin 72  TIBC 107  Sat 43  Ferritin 697  Plan  - Transfuse if <7  - Daily CBCs     #BPH  #Chronic Indwelling Fung  - on tamsulosin 0.4 mg  - Ua with 1+ LE and 44 WBC. Chronically infected  - no need to treat with abx due to chronic fung. Likely colonization      Diet: diabetic  DVT: eliquis  Dispo: admit to medicine for management of electrolyte abnormalities and acute encephalopathy. Pending MRI studies.      Code Status:      Full code    Blake Naranjo MD  [facility] Internal Medicine HO-I      [facility] Medicine Hospitalist Pager numbers:   [facility] Hospitalist Medicine Team A (Lorne/Riki): [phone]  [facility] Hospitalist Medicine Team B (Arik/Lucio):  [phone]

## 2024-07-29 NOTE — PLAN OF CARE
Pt transferred to [facility]. Rounds completed on pt. All questions addressed. Bedside nurse to discuss d/c medications. Discussed importance to attend all f/u appts and take medications as prescribed. Verbalized understanding. Case Management to sign off.     AMAYA Desai  [phone]     07/29/24 1516   Final Note   Assessment Type Final Discharge Note   Anticipated Discharge Disposition   (transfer to another hospital)   Post-Acute Status   Discharge Delays None known at this time

## 2024-07-30 ENCOUNTER — DOCUMENTATION ONLY (OUTPATIENT)
Dept: CARDIOLOGY | Facility: HOSPITAL | Age: 74
End: 2024-07-30
Payer: MEDICARE

## 2024-07-30 PROBLEM — N40.0 BPH (BENIGN PROSTATIC HYPERPLASIA): Status: ACTIVE | Noted: 2024-07-30

## 2024-07-30 PROBLEM — G93.41 ACUTE METABOLIC ENCEPHALOPATHY: Status: ACTIVE | Noted: 2024-07-30

## 2024-07-30 LAB
ALBUMIN SERPL BCP-MCNC: 1.5 G/DL (ref 3.5–5.2)
ANION GAP SERPL CALC-SCNC: 8 MMOL/L (ref 8–16)
BUN SERPL-MCNC: 24 MG/DL (ref 8–23)
CALCIUM SERPL-MCNC: 8.2 MG/DL (ref 8.7–10.5)
CHLORIDE SERPL-SCNC: 105 MMOL/L (ref 95–110)
CO2 SERPL-SCNC: 27 MMOL/L (ref 23–29)
CREAT SERPL-MCNC: 1.2 MG/DL (ref 0.5–1.4)
EST. GFR  (NO RACE VARIABLE): >60 ML/MIN/1.73 M^2
GLUCOSE SERPL-MCNC: 198 MG/DL (ref 70–110)
MAGNESIUM SERPL-MCNC: 2.1 MG/DL (ref 1.6–2.6)
PHOSPHATE SERPL-MCNC: 3 MG/DL (ref 2.7–4.5)
POCT GLUCOSE: 112 MG/DL (ref 70–110)
POCT GLUCOSE: 143 MG/DL (ref 70–110)
POCT GLUCOSE: 172 MG/DL (ref 70–110)
POCT GLUCOSE: 207 MG/DL (ref 70–110)
POTASSIUM SERPL-SCNC: 3.4 MMOL/L (ref 3.5–5.1)
SODIUM SERPL-SCNC: 140 MMOL/L (ref 136–145)
VANCOMYCIN SERPL-MCNC: 18.3 UG/ML

## 2024-07-30 PROCEDURE — 80069 RENAL FUNCTION PANEL: CPT | Performed by: INTERNAL MEDICINE

## 2024-07-30 PROCEDURE — 25000003 PHARM REV CODE 250: Performed by: INTERNAL MEDICINE

## 2024-07-30 PROCEDURE — 27000207 HC ISOLATION

## 2024-07-30 PROCEDURE — 20600001 HC STEP DOWN PRIVATE ROOM

## 2024-07-30 PROCEDURE — 83735 ASSAY OF MAGNESIUM: CPT | Performed by: INTERNAL MEDICINE

## 2024-07-30 PROCEDURE — 25000003 PHARM REV CODE 250: Performed by: PHYSICIAN ASSISTANT

## 2024-07-30 PROCEDURE — 63600175 PHARM REV CODE 636 W HCPCS: Performed by: INTERNAL MEDICINE

## 2024-07-30 PROCEDURE — 80202 ASSAY OF VANCOMYCIN: CPT | Performed by: INTERNAL MEDICINE

## 2024-07-30 RX ADMIN — OXYCODONE 5 MG: 5 TABLET ORAL at 06:07

## 2024-07-30 RX ADMIN — INSULIN GLARGINE 6 UNITS: 100 INJECTION, SOLUTION SUBCUTANEOUS at 08:07

## 2024-07-30 RX ADMIN — GABAPENTIN 100 MG: 100 CAPSULE ORAL at 08:07

## 2024-07-30 RX ADMIN — VANCOMYCIN HYDROCHLORIDE 1000 MG: 1 INJECTION, POWDER, LYOPHILIZED, FOR SOLUTION INTRAVENOUS at 06:07

## 2024-07-30 RX ADMIN — AMLODIPINE BESYLATE 5 MG: 5 TABLET ORAL at 08:07

## 2024-07-30 RX ADMIN — PIPERACILLIN SODIUM AND TAZOBACTAM SODIUM 4.5 G: 4; .5 INJECTION, POWDER, FOR SOLUTION INTRAVENOUS at 09:07

## 2024-07-30 RX ADMIN — PIPERACILLIN SODIUM AND TAZOBACTAM SODIUM 4.5 G: 4; .5 INJECTION, POWDER, FOR SOLUTION INTRAVENOUS at 05:07

## 2024-07-30 RX ADMIN — POTASSIUM & SODIUM PHOSPHATES POWDER PACK 280-160-250 MG 1 PACKET: 280-160-250 PACK at 08:07

## 2024-07-30 RX ADMIN — POTASSIUM CHLORIDE 40 MEQ: 1500 TABLET, EXTENDED RELEASE ORAL at 08:07

## 2024-07-30 RX ADMIN — APIXABAN 5 MG: 5 TABLET, FILM COATED ORAL at 08:07

## 2024-07-30 RX ADMIN — DIPHENHYDRAMINE HYDROCHLORIDE 25 MG: 25 CAPSULE ORAL at 06:07

## 2024-07-30 RX ADMIN — PANTOPRAZOLE SODIUM 40 MG: 40 TABLET, DELAYED RELEASE ORAL at 08:07

## 2024-07-30 RX ADMIN — POTASSIUM & SODIUM PHOSPHATES POWDER PACK 280-160-250 MG 1 PACKET: 280-160-250 PACK at 05:07

## 2024-07-30 RX ADMIN — TAMSULOSIN HYDROCHLORIDE 0.4 MG: 0.4 CAPSULE ORAL at 08:07

## 2024-07-30 RX ADMIN — PIPERACILLIN SODIUM AND TAZOBACTAM SODIUM 4.5 G: 4; .5 INJECTION, POWDER, FOR SOLUTION INTRAVENOUS at 02:07

## 2024-07-30 RX ADMIN — DIPHENHYDRAMINE HYDROCHLORIDE 25 MG: 25 CAPSULE ORAL at 12:07

## 2024-07-30 RX ADMIN — ACETAMINOPHEN 650 MG: 325 TABLET, FILM COATED ORAL at 08:07

## 2024-07-30 RX ADMIN — WHITE PETROLATUM: 1.75 OINTMENT TOPICAL at 08:07

## 2024-07-30 RX ADMIN — MUPIROCIN: 20 OINTMENT TOPICAL at 08:07

## 2024-07-30 RX ADMIN — POTASSIUM & SODIUM PHOSPHATES POWDER PACK 280-160-250 MG 1 PACKET: 280-160-250 PACK at 11:07

## 2024-07-30 RX ADMIN — INSULIN ASPART 1 UNITS: 100 INJECTION, SOLUTION INTRAVENOUS; SUBCUTANEOUS at 08:07

## 2024-07-30 NOTE — PROGRESS NOTES
Received a call from Taj DEL CID in the MRI Department in relation to this Inpatient needing to have a MRI and has a [employer] Pacemaker.  Please see information below as it relates to patient's Device being MRI compatible/conditional.  To meet protocol the Pacemaker/ICD must have been implanted no less than 6 weeks prior to scheduled date of Scan.  ICD/PPM and leads must be from same .  MRI informed ordering MD must input a Cardiac Device Check in clinic and hospital order, patient must have an xray within 6 months or less prior to MRI reprogramming.  Chest xray to be reviewed by Radiologist.        As per [employer]'s MR-Conditional product listing site this (PPM) system (IS) MRI compatible.  MRI scheduled on (7/31/24@11:00).  [employer] Rep (Marilou NDIAYE) notified on (7/26/24).  Confirmation received from Rep.

## 2024-07-31 LAB
ALBUMIN SERPL BCP-MCNC: 1.4 G/DL (ref 3.5–5.2)
ANION GAP SERPL CALC-SCNC: 6 MMOL/L (ref 8–16)
BACTERIA BLD CULT: NORMAL
BACTERIA BLD CULT: NORMAL
BUN SERPL-MCNC: 22 MG/DL (ref 8–23)
CALCIUM SERPL-MCNC: 8 MG/DL (ref 8.7–10.5)
CHLORIDE SERPL-SCNC: 107 MMOL/L (ref 95–110)
CO2 SERPL-SCNC: 27 MMOL/L (ref 23–29)
CREAT SERPL-MCNC: 1.6 MG/DL (ref 0.5–1.4)
CREAT UR-MCNC: 46 MG/DL (ref 23–375)
EST. GFR  (NO RACE VARIABLE): 44.7 ML/MIN/1.73 M^2
GLUCOSE SERPL-MCNC: 84 MG/DL (ref 70–110)
MAGNESIUM SERPL-MCNC: 2 MG/DL (ref 1.6–2.6)
PHOSPHATE SERPL-MCNC: 4 MG/DL (ref 2.7–4.5)
POCT GLUCOSE: 130 MG/DL (ref 70–110)
POCT GLUCOSE: 136 MG/DL (ref 70–110)
POCT GLUCOSE: 139 MG/DL (ref 70–110)
POCT GLUCOSE: 91 MG/DL (ref 70–110)
POTASSIUM SERPL-SCNC: 4.6 MMOL/L (ref 3.5–5.1)
SODIUM SERPL-SCNC: 140 MMOL/L (ref 136–145)
SODIUM UR-SCNC: 98 MMOL/L (ref 20–250)
VANCOMYCIN SERPL-MCNC: 22.9 UG/ML

## 2024-07-31 PROCEDURE — 80202 ASSAY OF VANCOMYCIN: CPT | Performed by: INTERNAL MEDICINE

## 2024-07-31 PROCEDURE — 63600175 PHARM REV CODE 636 W HCPCS: Performed by: INTERNAL MEDICINE

## 2024-07-31 PROCEDURE — 83735 ASSAY OF MAGNESIUM: CPT | Performed by: INTERNAL MEDICINE

## 2024-07-31 PROCEDURE — 84300 ASSAY OF URINE SODIUM: CPT | Performed by: INTERNAL MEDICINE

## 2024-07-31 PROCEDURE — 25000003 PHARM REV CODE 250: Performed by: INTERNAL MEDICINE

## 2024-07-31 PROCEDURE — 20600001 HC STEP DOWN PRIVATE ROOM

## 2024-07-31 PROCEDURE — 25000003 PHARM REV CODE 250: Performed by: PHYSICIAN ASSISTANT

## 2024-07-31 PROCEDURE — 80069 RENAL FUNCTION PANEL: CPT | Performed by: INTERNAL MEDICINE

## 2024-07-31 PROCEDURE — 82570 ASSAY OF URINE CREATININE: CPT | Performed by: INTERNAL MEDICINE

## 2024-07-31 PROCEDURE — 27000207 HC ISOLATION

## 2024-07-31 RX ORDER — POTASSIUM CHLORIDE 20 MEQ/1
20 TABLET, EXTENDED RELEASE ORAL
Status: DISCONTINUED | OUTPATIENT
Start: 2024-08-01 | End: 2024-08-02 | Stop reason: HOSPADM

## 2024-07-31 RX ORDER — SODIUM,POTASSIUM PHOSPHATES 280-250MG
2 POWDER IN PACKET (EA) ORAL
Status: DISCONTINUED | OUTPATIENT
Start: 2024-08-01 | End: 2024-08-02 | Stop reason: HOSPADM

## 2024-07-31 RX ADMIN — PIPERACILLIN SODIUM AND TAZOBACTAM SODIUM 4.5 G: 4; .5 INJECTION, POWDER, FOR SOLUTION INTRAVENOUS at 01:07

## 2024-07-31 RX ADMIN — OXYCODONE 5 MG: 5 TABLET ORAL at 08:07

## 2024-07-31 RX ADMIN — ACETAMINOPHEN 650 MG: 325 TABLET, FILM COATED ORAL at 06:07

## 2024-07-31 RX ADMIN — TAMSULOSIN HYDROCHLORIDE 0.4 MG: 0.4 CAPSULE ORAL at 09:07

## 2024-07-31 RX ADMIN — POTASSIUM & SODIUM PHOSPHATES POWDER PACK 280-160-250 MG 1 PACKET: 280-160-250 PACK at 09:07

## 2024-07-31 RX ADMIN — PANTOPRAZOLE SODIUM 40 MG: 40 TABLET, DELAYED RELEASE ORAL at 09:07

## 2024-07-31 RX ADMIN — DIPHENHYDRAMINE HYDROCHLORIDE 25 MG: 25 CAPSULE ORAL at 02:07

## 2024-07-31 RX ADMIN — WHITE PETROLATUM: 1.75 OINTMENT TOPICAL at 09:07

## 2024-07-31 RX ADMIN — PIPERACILLIN SODIUM AND TAZOBACTAM SODIUM 4.5 G: 4; .5 INJECTION, POWDER, FOR SOLUTION INTRAVENOUS at 09:07

## 2024-07-31 RX ADMIN — GABAPENTIN 100 MG: 100 CAPSULE ORAL at 08:07

## 2024-07-31 RX ADMIN — POTASSIUM CHLORIDE 40 MEQ: 1500 TABLET, EXTENDED RELEASE ORAL at 09:07

## 2024-07-31 RX ADMIN — INSULIN GLARGINE 6 UNITS: 100 INJECTION, SOLUTION SUBCUTANEOUS at 09:07

## 2024-07-31 RX ADMIN — PIPERACILLIN SODIUM AND TAZOBACTAM SODIUM 4.5 G: 4; .5 INJECTION, POWDER, FOR SOLUTION INTRAVENOUS at 06:07

## 2024-07-31 RX ADMIN — Medication 6 MG: at 08:07

## 2024-07-31 RX ADMIN — VANCOMYCIN HYDROCHLORIDE 500 MG: 500 INJECTION, POWDER, LYOPHILIZED, FOR SOLUTION INTRAVENOUS at 09:07

## 2024-07-31 RX ADMIN — MUPIROCIN: 20 OINTMENT TOPICAL at 09:07

## 2024-07-31 RX ADMIN — OXYCODONE 5 MG: 5 TABLET ORAL at 12:07

## 2024-07-31 RX ADMIN — APIXABAN 5 MG: 5 TABLET, FILM COATED ORAL at 08:07

## 2024-07-31 RX ADMIN — INSULIN GLARGINE 6 UNITS: 100 INJECTION, SOLUTION SUBCUTANEOUS at 08:07

## 2024-07-31 RX ADMIN — DIPHENHYDRAMINE HYDROCHLORIDE 25 MG: 25 CAPSULE ORAL at 08:07

## 2024-07-31 RX ADMIN — AMLODIPINE BESYLATE 5 MG: 5 TABLET ORAL at 09:07

## 2024-07-31 RX ADMIN — DIPHENHYDRAMINE HYDROCHLORIDE 25 MG: 25 CAPSULE ORAL at 06:07

## 2024-07-31 RX ADMIN — GABAPENTIN 100 MG: 100 CAPSULE ORAL at 09:07

## 2024-07-31 RX ADMIN — APIXABAN 5 MG: 5 TABLET, FILM COATED ORAL at 09:07

## 2024-08-01 LAB
ALBUMIN SERPL BCP-MCNC: 1.6 G/DL (ref 3.5–5.2)
ANION GAP SERPL CALC-SCNC: 8 MMOL/L (ref 8–16)
ANISOCYTOSIS BLD QL SMEAR: SLIGHT
BASOPHILS # BLD AUTO: 0.04 K/UL (ref 0–0.2)
BASOPHILS NFR BLD: 0.2 % (ref 0–1.9)
BUN SERPL-MCNC: 24 MG/DL (ref 8–23)
CALCIUM SERPL-MCNC: 8.3 MG/DL (ref 8.7–10.5)
CHLORIDE SERPL-SCNC: 106 MMOL/L (ref 95–110)
CO2 SERPL-SCNC: 27 MMOL/L (ref 23–29)
CREAT SERPL-MCNC: 1.8 MG/DL (ref 0.5–1.4)
CREAT UR-MCNC: 61 MG/DL (ref 23–375)
DIFFERENTIAL METHOD BLD: ABNORMAL
EOSINOPHIL # BLD AUTO: 2.7 K/UL (ref 0–0.5)
EOSINOPHIL NFR BLD: 16.2 % (ref 0–8)
ERYTHROCYTE [DISTWIDTH] IN BLOOD BY AUTOMATED COUNT: 18.1 % (ref 11.5–14.5)
EST. GFR  (NO RACE VARIABLE): 38.8 ML/MIN/1.73 M^2
GLUCOSE SERPL-MCNC: 103 MG/DL (ref 70–110)
HCT VFR BLD AUTO: 26.8 % (ref 40–54)
HGB BLD-MCNC: 8.3 G/DL (ref 14–18)
IMM GRANULOCYTES # BLD AUTO: 0.07 K/UL (ref 0–0.04)
IMM GRANULOCYTES NFR BLD AUTO: 0.4 % (ref 0–0.5)
LYMPHOCYTES # BLD AUTO: 1.3 K/UL (ref 1–4.8)
LYMPHOCYTES NFR BLD: 7.7 % (ref 18–48)
MAGNESIUM SERPL-MCNC: 1.9 MG/DL (ref 1.6–2.6)
MCH RBC QN AUTO: 26.7 PG (ref 27–31)
MCHC RBC AUTO-ENTMCNC: 31 G/DL (ref 32–36)
MCV RBC AUTO: 86 FL (ref 82–98)
MONOCYTES # BLD AUTO: 0.9 K/UL (ref 0.3–1)
MONOCYTES NFR BLD: 5 % (ref 4–15)
NEUTROPHILS # BLD AUTO: 11.9 K/UL (ref 1.8–7.7)
NEUTROPHILS NFR BLD: 70.5 % (ref 38–73)
NRBC BLD-RTO: 0 /100 WBC
OVALOCYTES BLD QL SMEAR: ABNORMAL
PHOSPHATE SERPL-MCNC: 4.4 MG/DL (ref 2.7–4.5)
PLATELET # BLD AUTO: 316 K/UL (ref 150–450)
PMV BLD AUTO: 9.1 FL (ref 9.2–12.9)
POCT GLUCOSE: 108 MG/DL (ref 70–110)
POCT GLUCOSE: 124 MG/DL (ref 70–110)
POCT GLUCOSE: 90 MG/DL (ref 70–110)
POCT GLUCOSE: 97 MG/DL (ref 70–110)
POIKILOCYTOSIS BLD QL SMEAR: SLIGHT
POLYCHROMASIA BLD QL SMEAR: ABNORMAL
POTASSIUM SERPL-SCNC: 5 MMOL/L (ref 3.5–5.1)
RBC # BLD AUTO: 3.11 M/UL (ref 4.6–6.2)
SCHISTOCYTES BLD QL SMEAR: ABNORMAL
SODIUM SERPL-SCNC: 141 MMOL/L (ref 136–145)
SODIUM UR-SCNC: 55 MMOL/L (ref 20–250)
UUN UR-MCNC: 385 MG/DL (ref 140–1050)
VANCOMYCIN SERPL-MCNC: 22.5 UG/ML
WBC # BLD AUTO: 16.84 K/UL (ref 3.9–12.7)

## 2024-08-01 PROCEDURE — 20600001 HC STEP DOWN PRIVATE ROOM

## 2024-08-01 PROCEDURE — 80069 RENAL FUNCTION PANEL: CPT | Performed by: INTERNAL MEDICINE

## 2024-08-01 PROCEDURE — 25000003 PHARM REV CODE 250: Performed by: PHYSICIAN ASSISTANT

## 2024-08-01 PROCEDURE — 63600175 PHARM REV CODE 636 W HCPCS: Performed by: INTERNAL MEDICINE

## 2024-08-01 PROCEDURE — 99222 1ST HOSP IP/OBS MODERATE 55: CPT | Mod: ,,, | Performed by: INTERNAL MEDICINE

## 2024-08-01 PROCEDURE — 25000003 PHARM REV CODE 250: Performed by: INTERNAL MEDICINE

## 2024-08-01 PROCEDURE — 27000207 HC ISOLATION

## 2024-08-01 PROCEDURE — 85025 COMPLETE CBC W/AUTO DIFF WBC: CPT | Performed by: INTERNAL MEDICINE

## 2024-08-01 PROCEDURE — 82570 ASSAY OF URINE CREATININE: CPT

## 2024-08-01 PROCEDURE — 80202 ASSAY OF VANCOMYCIN: CPT | Performed by: INTERNAL MEDICINE

## 2024-08-01 PROCEDURE — 84540 ASSAY OF URINE/UREA-N: CPT

## 2024-08-01 PROCEDURE — 84300 ASSAY OF URINE SODIUM: CPT

## 2024-08-01 PROCEDURE — 83735 ASSAY OF MAGNESIUM: CPT | Performed by: INTERNAL MEDICINE

## 2024-08-01 RX ADMIN — GABAPENTIN 100 MG: 100 CAPSULE ORAL at 09:08

## 2024-08-01 RX ADMIN — Medication 6 MG: at 08:08

## 2024-08-01 RX ADMIN — AMLODIPINE BESYLATE 5 MG: 5 TABLET ORAL at 09:08

## 2024-08-01 RX ADMIN — GABAPENTIN 100 MG: 100 CAPSULE ORAL at 08:08

## 2024-08-01 RX ADMIN — WHITE PETROLATUM: 1.75 OINTMENT TOPICAL at 09:08

## 2024-08-01 RX ADMIN — ACETAMINOPHEN 650 MG: 325 TABLET, FILM COATED ORAL at 08:08

## 2024-08-01 RX ADMIN — INSULIN GLARGINE 6 UNITS: 100 INJECTION, SOLUTION SUBCUTANEOUS at 08:08

## 2024-08-01 RX ADMIN — PIPERACILLIN SODIUM AND TAZOBACTAM SODIUM 4.5 G: 4; .5 INJECTION, POWDER, FOR SOLUTION INTRAVENOUS at 04:08

## 2024-08-01 RX ADMIN — INSULIN GLARGINE 6 UNITS: 100 INJECTION, SOLUTION SUBCUTANEOUS at 09:08

## 2024-08-01 RX ADMIN — TAMSULOSIN HYDROCHLORIDE 0.4 MG: 0.4 CAPSULE ORAL at 09:08

## 2024-08-01 RX ADMIN — PIPERACILLIN SODIUM AND TAZOBACTAM SODIUM 4.5 G: 4; .5 INJECTION, POWDER, FOR SOLUTION INTRAVENOUS at 09:08

## 2024-08-01 RX ADMIN — APIXABAN 5 MG: 5 TABLET, FILM COATED ORAL at 08:08

## 2024-08-01 RX ADMIN — POTASSIUM & SODIUM PHOSPHATES POWDER PACK 280-160-250 MG 2 PACKET: 280-160-250 PACK at 05:08

## 2024-08-01 RX ADMIN — DIPHENHYDRAMINE HYDROCHLORIDE 25 MG: 25 CAPSULE ORAL at 09:08

## 2024-08-01 RX ADMIN — APIXABAN 5 MG: 5 TABLET, FILM COATED ORAL at 09:08

## 2024-08-01 RX ADMIN — PANTOPRAZOLE SODIUM 40 MG: 40 TABLET, DELAYED RELEASE ORAL at 09:08

## 2024-08-01 RX ADMIN — PIPERACILLIN SODIUM AND TAZOBACTAM SODIUM 4.5 G: 4; .5 INJECTION, POWDER, FOR SOLUTION INTRAVENOUS at 02:08

## 2024-08-01 RX ADMIN — POTASSIUM CHLORIDE 20 MEQ: 1500 TABLET, EXTENDED RELEASE ORAL at 05:08

## 2024-08-01 RX ADMIN — DIPHENHYDRAMINE HYDROCHLORIDE 25 MG: 25 CAPSULE ORAL at 08:08

## 2024-08-01 RX ADMIN — SENNOSIDES AND DOCUSATE SODIUM 1 TABLET: 8.6; 5 TABLET ORAL at 09:08

## 2024-08-01 RX ADMIN — OXYCODONE 5 MG: 5 TABLET ORAL at 04:08

## 2024-08-02 ENCOUNTER — HOSPITAL ENCOUNTER (INPATIENT)
Facility: HOSPITAL | Age: 74
LOS: 4 days | Discharge: NURSING, REHAB OR LONG-TERM CARE | DRG: 091 | End: 2024-08-06
Attending: INTERNAL MEDICINE | Admitting: INTERNAL MEDICINE
Payer: MEDICARE

## 2024-08-02 VITALS
TEMPERATURE: 99 F | BODY MASS INDEX: 24.74 KG/M2 | OXYGEN SATURATION: 98 % | SYSTOLIC BLOOD PRESSURE: 131 MMHG | HEIGHT: 70 IN | RESPIRATION RATE: 16 BRPM | HEART RATE: 53 BPM | DIASTOLIC BLOOD PRESSURE: 66 MMHG | WEIGHT: 172.81 LBS

## 2024-08-02 DIAGNOSIS — G93.41 METABOLIC ENCEPHALOPATHY: ICD-10-CM

## 2024-08-02 DIAGNOSIS — I48.91 ATRIAL FIBRILLATION, UNSPECIFIED TYPE: ICD-10-CM

## 2024-08-02 DIAGNOSIS — G93.40 ACUTE ENCEPHALOPATHY: ICD-10-CM

## 2024-08-02 DIAGNOSIS — R07.9 CHEST PAIN: ICD-10-CM

## 2024-08-02 DIAGNOSIS — T07.XXXA WOUNDS, MULTIPLE: Primary | ICD-10-CM

## 2024-08-02 DIAGNOSIS — G93.41 ACUTE METABOLIC ENCEPHALOPATHY: ICD-10-CM

## 2024-08-02 DIAGNOSIS — N17.9 AKI (ACUTE KIDNEY INJURY): ICD-10-CM

## 2024-08-02 LAB
ALBUMIN SERPL BCP-MCNC: 1.6 G/DL (ref 3.5–5.2)
ANION GAP SERPL CALC-SCNC: 7 MMOL/L (ref 8–16)
BUN SERPL-MCNC: 24 MG/DL (ref 8–23)
CALCIUM SERPL-MCNC: 8.1 MG/DL (ref 8.7–10.5)
CHLORIDE SERPL-SCNC: 106 MMOL/L (ref 95–110)
CK SERPL-CCNC: 13 U/L (ref 20–200)
CO2 SERPL-SCNC: 28 MMOL/L (ref 23–29)
CREAT SERPL-MCNC: 1.8 MG/DL (ref 0.5–1.4)
EST. GFR  (NO RACE VARIABLE): 38.8 ML/MIN/1.73 M^2
GLUCOSE SERPL-MCNC: 71 MG/DL (ref 70–110)
MAGNESIUM SERPL-MCNC: 1.8 MG/DL (ref 1.6–2.6)
PHOSPHATE SERPL-MCNC: 4.7 MG/DL (ref 2.7–4.5)
POCT GLUCOSE: 154 MG/DL (ref 70–110)
POCT GLUCOSE: 71 MG/DL (ref 70–110)
POCT GLUCOSE: 99 MG/DL (ref 70–110)
POTASSIUM SERPL-SCNC: 4.6 MMOL/L (ref 3.5–5.1)
SODIUM SERPL-SCNC: 141 MMOL/L (ref 136–145)
VANCOMYCIN SERPL-MCNC: 17.7 UG/ML
VANCOMYCIN SERPL-MCNC: 21.6 UG/ML

## 2024-08-02 PROCEDURE — 82550 ASSAY OF CK (CPK): CPT | Performed by: INTERNAL MEDICINE

## 2024-08-02 PROCEDURE — 25000003 PHARM REV CODE 250: Performed by: INTERNAL MEDICINE

## 2024-08-02 PROCEDURE — 63600175 PHARM REV CODE 636 W HCPCS: Performed by: INTERNAL MEDICINE

## 2024-08-02 PROCEDURE — 83735 ASSAY OF MAGNESIUM: CPT | Performed by: INTERNAL MEDICINE

## 2024-08-02 PROCEDURE — 80202 ASSAY OF VANCOMYCIN: CPT | Mod: 91 | Performed by: INTERNAL MEDICINE

## 2024-08-02 PROCEDURE — 25000003 PHARM REV CODE 250: Performed by: PHYSICIAN ASSISTANT

## 2024-08-02 PROCEDURE — 11000001 HC ACUTE MED/SURG PRIVATE ROOM

## 2024-08-02 PROCEDURE — 99232 SBSQ HOSP IP/OBS MODERATE 35: CPT | Mod: ,,, | Performed by: INTERNAL MEDICINE

## 2024-08-02 PROCEDURE — 36415 COLL VENOUS BLD VENIPUNCTURE: CPT | Performed by: INTERNAL MEDICINE

## 2024-08-02 PROCEDURE — 27000207 HC ISOLATION

## 2024-08-02 PROCEDURE — 80069 RENAL FUNCTION PANEL: CPT | Performed by: INTERNAL MEDICINE

## 2024-08-02 RX ORDER — IBUPROFEN 200 MG
24 TABLET ORAL
Status: DISCONTINUED | OUTPATIENT
Start: 2024-08-02 | End: 2024-08-06 | Stop reason: HOSPADM

## 2024-08-02 RX ORDER — DIPHENHYDRAMINE HCL 25 MG
25 CAPSULE ORAL EVERY 6 HOURS PRN
Status: DISCONTINUED | OUTPATIENT
Start: 2024-08-02 | End: 2024-08-06 | Stop reason: HOSPADM

## 2024-08-02 RX ORDER — MUPIROCIN 20 MG/G
OINTMENT TOPICAL 2 TIMES DAILY
Status: DISCONTINUED | OUTPATIENT
Start: 2024-08-03 | End: 2024-08-06 | Stop reason: HOSPADM

## 2024-08-02 RX ORDER — SODIUM CHLORIDE 0.9 % (FLUSH) 0.9 %
10 SYRINGE (ML) INJECTION EVERY 12 HOURS PRN
Status: DISCONTINUED | OUTPATIENT
Start: 2024-08-02 | End: 2024-08-06 | Stop reason: HOSPADM

## 2024-08-02 RX ORDER — TAMSULOSIN HYDROCHLORIDE 0.4 MG/1
0.4 CAPSULE ORAL DAILY
Status: DISCONTINUED | OUTPATIENT
Start: 2024-08-03 | End: 2024-08-06 | Stop reason: HOSPADM

## 2024-08-02 RX ORDER — AMLODIPINE BESYLATE 5 MG/1
5 TABLET ORAL DAILY
Status: DISCONTINUED | OUTPATIENT
Start: 2024-08-03 | End: 2024-08-06 | Stop reason: HOSPADM

## 2024-08-02 RX ORDER — POTASSIUM CHLORIDE 20 MEQ/1
20 TABLET, EXTENDED RELEASE ORAL
Status: DISCONTINUED | OUTPATIENT
Start: 2024-08-03 | End: 2024-08-05

## 2024-08-02 RX ORDER — INSULIN GLARGINE 100 [IU]/ML
6 INJECTION, SOLUTION SUBCUTANEOUS 2 TIMES DAILY
Status: DISCONTINUED | OUTPATIENT
Start: 2024-08-03 | End: 2024-08-06 | Stop reason: HOSPADM

## 2024-08-02 RX ORDER — OXYCODONE HYDROCHLORIDE 5 MG/1
5 TABLET ORAL EVERY 6 HOURS PRN
Status: DISCONTINUED | OUTPATIENT
Start: 2024-08-02 | End: 2024-08-06 | Stop reason: HOSPADM

## 2024-08-02 RX ORDER — AMOXICILLIN 250 MG
1 CAPSULE ORAL 2 TIMES DAILY
Status: DISCONTINUED | OUTPATIENT
Start: 2024-08-03 | End: 2024-08-06 | Stop reason: HOSPADM

## 2024-08-02 RX ORDER — SODIUM,POTASSIUM PHOSPHATES 280-250MG
2 POWDER IN PACKET (EA) ORAL
Status: DISCONTINUED | OUTPATIENT
Start: 2024-08-03 | End: 2024-08-05

## 2024-08-02 RX ORDER — INSULIN ASPART 100 [IU]/ML
0-5 INJECTION, SOLUTION INTRAVENOUS; SUBCUTANEOUS
Status: DISCONTINUED | OUTPATIENT
Start: 2024-08-02 | End: 2024-08-06 | Stop reason: HOSPADM

## 2024-08-02 RX ORDER — TALC
6 POWDER (GRAM) TOPICAL NIGHTLY PRN
Status: DISCONTINUED | OUTPATIENT
Start: 2024-08-02 | End: 2024-08-06 | Stop reason: HOSPADM

## 2024-08-02 RX ORDER — ACETAMINOPHEN 325 MG/1
650 TABLET ORAL EVERY 4 HOURS PRN
Status: DISCONTINUED | OUTPATIENT
Start: 2024-08-02 | End: 2024-08-06 | Stop reason: HOSPADM

## 2024-08-02 RX ORDER — GLUCAGON 1 MG
1 KIT INJECTION
Status: DISCONTINUED | OUTPATIENT
Start: 2024-08-02 | End: 2024-08-06 | Stop reason: HOSPADM

## 2024-08-02 RX ORDER — PANTOPRAZOLE SODIUM 40 MG/1
40 TABLET, DELAYED RELEASE ORAL DAILY
Status: DISCONTINUED | OUTPATIENT
Start: 2024-08-03 | End: 2024-08-06 | Stop reason: HOSPADM

## 2024-08-02 RX ORDER — NALOXONE HCL 0.4 MG/ML
0.02 VIAL (ML) INJECTION
Status: DISCONTINUED | OUTPATIENT
Start: 2024-08-02 | End: 2024-08-06 | Stop reason: HOSPADM

## 2024-08-02 RX ORDER — IBUPROFEN 200 MG
16 TABLET ORAL
Status: DISCONTINUED | OUTPATIENT
Start: 2024-08-02 | End: 2024-08-06 | Stop reason: HOSPADM

## 2024-08-02 RX ORDER — GABAPENTIN 100 MG/1
100 CAPSULE ORAL 2 TIMES DAILY
Status: DISCONTINUED | OUTPATIENT
Start: 2024-08-03 | End: 2024-08-06 | Stop reason: HOSPADM

## 2024-08-02 RX ADMIN — GABAPENTIN 100 MG: 100 CAPSULE ORAL at 09:08

## 2024-08-02 RX ADMIN — TAMSULOSIN HYDROCHLORIDE 0.4 MG: 0.4 CAPSULE ORAL at 09:08

## 2024-08-02 RX ADMIN — PANTOPRAZOLE SODIUM 40 MG: 40 TABLET, DELAYED RELEASE ORAL at 09:08

## 2024-08-02 RX ADMIN — SENNOSIDES AND DOCUSATE SODIUM 1 TABLET: 8.6; 5 TABLET ORAL at 09:08

## 2024-08-02 RX ADMIN — VANCOMYCIN HYDROCHLORIDE 500 MG: 500 INJECTION, POWDER, LYOPHILIZED, FOR SOLUTION INTRAVENOUS at 09:08

## 2024-08-02 RX ADMIN — APIXABAN 5 MG: 5 TABLET, FILM COATED ORAL at 09:08

## 2024-08-02 RX ADMIN — PIPERACILLIN SODIUM AND TAZOBACTAM SODIUM 4.5 G: 4; .5 INJECTION, POWDER, FOR SOLUTION INTRAVENOUS at 09:08

## 2024-08-02 RX ADMIN — PIPERACILLIN SODIUM AND TAZOBACTAM SODIUM 4.5 G: 4; .5 INJECTION, POWDER, FOR SOLUTION INTRAVENOUS at 05:08

## 2024-08-02 RX ADMIN — POTASSIUM & SODIUM PHOSPHATES POWDER PACK 280-160-250 MG 2 PACKET: 280-160-250 PACK at 05:08

## 2024-08-02 RX ADMIN — DIPHENHYDRAMINE HYDROCHLORIDE 25 MG: 25 CAPSULE ORAL at 02:08

## 2024-08-02 RX ADMIN — PIPERACILLIN SODIUM AND TAZOBACTAM SODIUM 4.5 G: 4; .5 INJECTION, POWDER, FOR SOLUTION INTRAVENOUS at 02:08

## 2024-08-02 RX ADMIN — AMLODIPINE BESYLATE 5 MG: 5 TABLET ORAL at 09:08

## 2024-08-02 RX ADMIN — WHITE PETROLATUM: 1.75 OINTMENT TOPICAL at 09:08

## 2024-08-02 RX ADMIN — ACETAMINOPHEN 650 MG: 325 TABLET, FILM COATED ORAL at 02:08

## 2024-08-02 RX ADMIN — POTASSIUM CHLORIDE 20 MEQ: 1500 TABLET, EXTENDED RELEASE ORAL at 05:08

## 2024-08-02 RX ADMIN — INSULIN GLARGINE 6 UNITS: 100 INJECTION, SOLUTION SUBCUTANEOUS at 09:08

## 2024-08-03 LAB
ALBUMIN SERPL BCP-MCNC: 1.7 G/DL (ref 3.5–5.2)
ANION GAP SERPL CALC-SCNC: 13 MMOL/L (ref 8–16)
BUN SERPL-MCNC: 22 MG/DL (ref 8–23)
CALCIUM SERPL-MCNC: 8.4 MG/DL (ref 8.7–10.5)
CHLORIDE SERPL-SCNC: 106 MMOL/L (ref 95–110)
CO2 SERPL-SCNC: 22 MMOL/L (ref 23–29)
CREAT SERPL-MCNC: 1.9 MG/DL (ref 0.5–1.4)
EST. GFR  (NO RACE VARIABLE): 36 ML/MIN/1.73 M^2
GLUCOSE SERPL-MCNC: 155 MG/DL (ref 70–110)
MAGNESIUM SERPL-MCNC: 1.8 MG/DL (ref 1.6–2.6)
OHS QRS DURATION: 160 MS
OHS QRS DURATION: 166 MS
OHS QRS DURATION: 186 MS
OHS QTC CALCULATION: 527 MS
OHS QTC CALCULATION: 527 MS
OHS QTC CALCULATION: 564 MS
PHOSPHATE SERPL-MCNC: 4.7 MG/DL (ref 2.7–4.5)
POCT GLUCOSE: 156 MG/DL (ref 70–110)
POCT GLUCOSE: 163 MG/DL (ref 70–110)
POCT GLUCOSE: 192 MG/DL (ref 70–110)
POCT GLUCOSE: 97 MG/DL (ref 70–110)
POTASSIUM SERPL-SCNC: 4.3 MMOL/L (ref 3.5–5.1)
SODIUM SERPL-SCNC: 141 MMOL/L (ref 136–145)
VANCOMYCIN SERPL-MCNC: 18.8 UG/ML

## 2024-08-03 PROCEDURE — 27000207 HC ISOLATION

## 2024-08-03 PROCEDURE — 80069 RENAL FUNCTION PANEL: CPT

## 2024-08-03 PROCEDURE — 11000001 HC ACUTE MED/SURG PRIVATE ROOM

## 2024-08-03 PROCEDURE — 63600175 PHARM REV CODE 636 W HCPCS

## 2024-08-03 PROCEDURE — 25000003 PHARM REV CODE 250

## 2024-08-03 PROCEDURE — 80202 ASSAY OF VANCOMYCIN: CPT

## 2024-08-03 PROCEDURE — 63600175 PHARM REV CODE 636 W HCPCS: Performed by: INTERNAL MEDICINE

## 2024-08-03 PROCEDURE — 25000003 PHARM REV CODE 250: Performed by: INTERNAL MEDICINE

## 2024-08-03 PROCEDURE — 83735 ASSAY OF MAGNESIUM: CPT

## 2024-08-03 RX ADMIN — PIPERACILLIN SODIUM AND TAZOBACTAM SODIUM 4.5 G: 4; .5 INJECTION, POWDER, LYOPHILIZED, FOR SOLUTION INTRAVENOUS at 09:08

## 2024-08-03 RX ADMIN — GABAPENTIN 100 MG: 100 CAPSULE ORAL at 09:08

## 2024-08-03 RX ADMIN — INSULIN GLARGINE 6 UNITS: 100 INJECTION, SOLUTION SUBCUTANEOUS at 09:08

## 2024-08-03 RX ADMIN — POTASSIUM CHLORIDE 20 MEQ: 1500 TABLET, EXTENDED RELEASE ORAL at 06:08

## 2024-08-03 RX ADMIN — PIPERACILLIN SODIUM AND TAZOBACTAM SODIUM 4.5 G: 4; .5 INJECTION, POWDER, LYOPHILIZED, FOR SOLUTION INTRAVENOUS at 05:08

## 2024-08-03 RX ADMIN — APIXABAN 5 MG: 5 TABLET, FILM COATED ORAL at 09:08

## 2024-08-03 RX ADMIN — MUPIROCIN: 20 OINTMENT TOPICAL at 09:08

## 2024-08-03 RX ADMIN — DIPHENHYDRAMINE HYDROCHLORIDE 25 MG: 25 CAPSULE ORAL at 09:08

## 2024-08-03 RX ADMIN — PANTOPRAZOLE SODIUM 40 MG: 40 TABLET, DELAYED RELEASE ORAL at 09:08

## 2024-08-03 RX ADMIN — AMLODIPINE BESYLATE 5 MG: 5 TABLET ORAL at 09:08

## 2024-08-03 RX ADMIN — SENNOSIDES AND DOCUSATE SODIUM 1 TABLET: 8.6; 5 TABLET ORAL at 09:08

## 2024-08-03 RX ADMIN — WHITE PETROLATUM: 1.75 OINTMENT TOPICAL at 03:08

## 2024-08-03 RX ADMIN — POTASSIUM & SODIUM PHOSPHATES POWDER PACK 280-160-250 MG 2 PACKET: 280-160-250 PACK at 06:08

## 2024-08-03 RX ADMIN — TAMSULOSIN HYDROCHLORIDE 0.4 MG: 0.4 CAPSULE ORAL at 09:08

## 2024-08-03 RX ADMIN — VANCOMYCIN HYDROCHLORIDE 500 MG: 500 INJECTION, POWDER, LYOPHILIZED, FOR SOLUTION INTRAVENOUS at 06:08

## 2024-08-03 RX ADMIN — PIPERACILLIN SODIUM AND TAZOBACTAM SODIUM 4.5 G: 4; .5 INJECTION, POWDER, LYOPHILIZED, FOR SOLUTION INTRAVENOUS at 01:08

## 2024-08-03 NOTE — PROGRESS NOTES
Inpatient Upgrade Note    Saji Castañeda has warranted treatment spanning two or more midnights of hospital level care for the management of worsening YNES, osteomyelitis. He continues to require IV antibiotics, IV fluids, daily labs, further testing/imaging, and further evaluation by consultants. His condition is also complicated by the following comorbidities: Hypertension and Diabetes    Dirk Condon MD  [facility] Internal [facility] PGY-2

## 2024-08-03 NOTE — PROGRESS NOTES
"[facility] Medicine Progress Note    Admitting Team: \[facility]\"" Hospitalist Team B  Attending Physician: Vinod Elise MD  Resident: Dirk Condon   Intern: Dutch Huynh       Date of Admit: 8/2/2024    Subjective/Interval History      Patient seen and examined this morning.  Patient reports he is currently feeling well and in no active complaints.  Continuing IV antibiotics for osteomyelitis.  Consulted nephrology for YNES of unknown source.       Objective     Physical Examination:  /61 (BP Location: Right arm, Patient Position: Lying)   Pulse (!) 42   Temp 98.5 °F (36.9 °C) (Oral)   Resp 20   Ht 5' 10" (1.778 m)   Wt 80 kg (176 lb 5.9 oz)   SpO2 100%   BMI 25.31 kg/m²    Constitutional:       General: He is not in acute distress.     Appearance: He is not ill-appearing.   HENT:      Head: Normocephalic and atraumatic.      Right Ear: External ear normal.      Left Ear: External ear normal.      Nose: Nose normal.   Eyes:      General:         Right eye: No discharge.         Left eye: No discharge.      Extraocular Movements: Extraocular movements intact.   Cardiovascular:      Rate and Rhythm: Normal rate and regular rhythm.      Pulses: Normal pulses.      Heart sounds: Normal heart sounds. No murmur heard.  Pulmonary:      Effort: Pulmonary effort is normal.      Breath sounds: Normal breath sounds.   Abdominal:      Palpations: Abdomen is soft.      Tenderness: There is no abdominal tenderness. There is no guarding or rebound.   Genitourinary:     Comments: Sams cath in place draining clear, yellow urine   Musculoskeletal:      Comments: Left AKA.    Skin:     Comments: R foot Ulcer wrapped and in boot. Stage 4 Sacral ulcer present.    Neurological:      Mental Status: He is alert.      Comments: Patient is oriented and conversant. Answering questions appropriately    Intake/Output:    Intake/Output Summary (Last 24 hours) at 8/3/2024 1602  Last data filed at 8/3/2024 0606  Gross per 24 hour   Intake -- "   Output 1000 ml   Net -1000 ml     Net IO Since Admission: -1,000 mL [08/03/24 1605]    Laboratory data: reviewed     Microbiology data: reviewed     EKG data: reviewed     Radiology data: reviewed      Current Medications:     Infusions:       Scheduled:   amLODIPine  5 mg Oral Daily    apixaban  5 mg Oral BID    gabapentin  100 mg Oral BID    insulin glargine U-100 (Lantus)  6 Units Subcutaneous BID    mupirocin   Nasal BID    pantoprazole  40 mg Oral Daily    piperacillin-tazobactam (Zosyn) IV (PEDS and ADULTS) (extended infusion is not appropriate)  4.5 g Intravenous Q8H    potassium chloride  20 mEq Oral Before breakfast    potassium, sodium phosphates  2 packet Oral Before breakfast    senna-docusate 8.6-50 mg  1 tablet Oral BID    tamsulosin  0.4 mg Oral Daily    white petrolatum   Topical (Top) Daily        PRN:    Current Facility-Administered Medications:     acetaminophen, 650 mg, Oral, Q4H PRN    dextrose 10%, 12.5 g, Intravenous, PRN    dextrose 10%, 25 g, Intravenous, PRN    diphenhydrAMINE, 25 mg, Oral, Q6H PRN    glucagon (human recombinant), 1 mg, Intramuscular, PRN    glucose, 16 g, Oral, PRN    glucose, 24 g, Oral, PRN    insulin aspart U-100, 0-5 Units, Subcutaneous, QID (AC + HS) PRN    melatonin, 6 mg, Oral, Nightly PRN    naloxone, 0.02 mg, Intravenous, PRN    oxyCODONE, 5 mg, Oral, Q6H PRN    sodium chloride 0.9%, 10 mL, Intravenous, Q12H PRN    sodium chloride 0.9%, 10 mL, Intravenous, Q12H PRN    Pharmacy to dose Vancomycin consult, , , Once **AND** vancomycin - pharmacy to dose, , Intravenous, pharmacy to manage frequency      Assessment/Plan:     Saji Castañeda is a 75 YO M With a PMH of IDDM2, PVD, BPH with chronic fung, parxoysmal A fib, CHB, HTN, and recent R foot Osteomyelitis, who initially presented won 7/25/24 for acute encephalopathy who was transferred to [facility] for MRI on 7/2924, with now improving mental status, but new acute kidney injury and transferred back [place] [facility]  "for ongoing medical management.        #Acute Encephalopathy, Resolving   Initially presented with expressive aphasia and bilateral upper extremity tremors. CT head, MRI Brain/C Spine negative for an acute process, and now significantly improved mental status. Thought may be related to cefepime induced neurotoxicity.   - Appears close to baseline now   - Discussed case with Neurology; will weigh in on final neuro recs.    - Avoid Cefepime. Transitioned to Zosyn      #Acute Kidney Injury   BL Cr of ~1.2, trended up 1.6 -> 1.8. Now Plateaued. Thought may be related to vancomycin induced nephrotoxicity given earlier supratherapeutic troughs.   - Trend Cr, given plateau anticipate improvement   - Continue to monitor vancomycin troughs and dose appropriately   - Workup at [facility] showed indeterminate urine studies as well as renal ultrasound that shows "Renal parenchymal findings suggest chronic medical renal disease."  - Unclear etiology; could potentially be associated with antibiotics  - [facility] consulted for further assistance      #Osteomyelitis of R foot  #Stage 4 ulcers to R foot and sacrum  #Left AKA  R heel osteomyelitis diagnosed during admission on 6/26/24 - 7/11/24. Bone culture positive for pseudomonas and MRSA. Initially discharged on Vanc and Cefepime for 6 week course. Transitioned to Vanc Zosyn this admission given concern for cefepime neurotoxicity.   - Chronic stage IV sacral wound and R heel wound present; pictures in chart   - No signs of wound infection currently   - Continue Vanc and Zosyn              - Pharmacy consulted for Vancomycin dosing   -Wound care consulted   - Can discuss with ID antibiotic choice, if YNES remains problematic      #DM2 w/ long-term insulin use   Last A1c  6.0% on 7/25/24. Home regimen is Lantus 12 U BID/Novolog 5U w/ meals  - Current Insulin regimen               - Basal Insulin - Lantus 6u BID               - Low Dose SSI with meals   - POC Glucose with " meals and at bedtime   - BG Goal of 140 - 180 while in patient   - Adjust as needed, ideally ~ 50/50 basal/bolus      #Paroxysmal Atrial Fibrillation   - Continue eliquis 5 mg BID      #HTN  - Continue amlodipine 5 mg   - Given improvement in mental status, could transition back to home med of nifedipine 90 mg      #BPHD   #Urinary Retention with long standing fung cath   - Continue tamsulosin 0.4 mg      #Anemia of Chronic Disease   - Trend with CBC   - Transfuse for Hgb <7     Code: Full   TLD: L Brachial PICC, Fung Cath (Chronic),   Diet: Diabetic   DVT Ppx: Johnny Condon MD  [facility] Internal Medicine PGY-2  [facility] Medicine Team B: Pager [phone]

## 2024-08-03 NOTE — PLAN OF CARE
Problem: Diabetes Comorbidity  Goal: Blood Glucose Level Within Targeted Range  Outcome: Progressing  Intervention: Monitor and Manage Glycemia  Flowsheets (Taken 8/3/2024 1657)  Glycemic Management: blood glucose monitored     Problem: Acute Kidney Injury/Impairment  Goal: Improved Oral Intake  Outcome: Progressing  Intervention: Promote and Optimize Oral Intake  Flowsheets (Taken 8/3/2024 1657)  Nutrition Interventions: meal set-up provided  Goal: Effective Renal Function  Outcome: Progressing     Problem: Infection  Goal: Absence of Infection Signs and Symptoms  Outcome: Progressing  Intervention: Prevent or Manage Infection  Flowsheets (Taken 8/3/2024 1657)  Fever Reduction/Comfort Measures:   lightweight bedding   lightweight clothing  Isolation Precautions:   precautions maintained   contact     Problem: Skin Injury Risk Increased  Goal: Skin Health and Integrity  Outcome: Progressing  Intervention: Optimize Skin Protection  Flowsheets (Taken 8/3/2024 1657)  Pressure Reduction Techniques:   weight shift assistance provided   pressure points protected   positioned off wounds  Pressure Reduction Devices:   positioning supports utilized   heel offloading device utilized  Activity Management: Rolling - L1  Head of Bed (HOB) Positioning: HOB elevated

## 2024-08-03 NOTE — PROGRESS NOTES
Pharmacokinetic Assessment Follow Up: IV Vancomycin    Vancomycin serum concentration assessment(s):    The random level was drawn correctly and can be used to guide therapy at this time. The measurement is within the desired definitive target range of 15 to 20 mcg/mL.    Vancomycin Regimen Plan:    Re-dose when the random level is less than 20 mcg/mL, next level to be drawn at 8/4 on 0400    Drug levels (last 3 results):  Recent Labs   Lab Result Units 08/02/24  0522 08/02/24  1602 08/03/24  0306   Vancomycin, Random ug/mL 17.7 21.6 18.8       Pharmacy will continue to follow and monitor vancomycin.    Please contact pharmacy at extension [phone] for questions regarding this assessment.    Thank you for the consult,   Carmine Ortiz       Patient brief summary:  Saji Castañeda is a 74 y.o. male initiated on antimicrobial therapy with IV Vancomycin for treatment of bone/joint infection    The patient's current regimen is pulse dose     Drug Allergies:   Review of patient's allergies indicates:  No Known Allergies    Actual Body Weight:   80kg    Renal Function:   Estimated Creatinine Clearance: 34.7 mL/min (A) (based on SCr of 1.9 mg/dL (H)).,     Dialysis Method (if applicable):  N/A    CBC (last 72 hours):  Recent Labs   Lab Result Units 08/01/24  0516   WBC K/uL 16.84*   Hemoglobin g/dL 8.3*   Hematocrit % 26.8*   Platelets K/uL 316   Gran % % 70.5   Lymph % % 7.7*   Mono % % 5.0   Eosinophil % % 16.2*   Basophil % % 0.2   Differential Method  Automated       Metabolic Panel (last 72 hours):  Recent Labs   Lab Result Units 07/31/24  1528 08/01/24  0516 08/01/24  1359 08/02/24  0522 08/03/24  0306   Sodium mmol/L  --  141  --  141 141   Sodium, Urine mmol/L 98  --  55  --   --    Potassium mmol/L  --  5.0  --  4.6 4.3   Chloride mmol/L  --  106  --  106 106   CO2 mmol/L  --  27  --  28 22*   Glucose mg/dL  --  103  --  71 155*   BUN mg/dL  --  24*  --  24* 22   Creatinine mg/dL  --  1.8*  --  1.8* 1.9*   Creatinine,  Urine mg/dL 46.0  --  61.0  --   --    Albumin g/dL  --  1.6*  --  1.6* 1.7*   Magnesium mg/dL  --  1.9  --  1.8 1.8   Phosphorus mg/dL  --  4.4  --  4.7* 4.7*       Vancomycin Administrations:  vancomycin given in the last 96 hours                     vancomycin (VANCOCIN) 500 mg in D5W 100 mL IVPB (MB+) (mg) 500 mg New Bag 08/02/24 0945    vancomycin (VANCOCIN) 500 mg in D5W 100 mL IVPB (MB+) (mg) 500 mg New Bag 07/31/24 0938    vancomycin (VANCOCIN) 1,000 mg in D5W 250 mL IVPB (Vial-Mate) (mg) 1,000 mg New Bag 07/30/24 0653                    Microbiologic Results:  Microbiology Results (last 7 days)       ** No results found for the last 168 hours. **

## 2024-08-03 NOTE — PLAN OF CARE
Problem: Adult Inpatient Plan of Care  Goal: Plan of Care Review  Outcome: Progressing      Please review and I will call for an

## 2024-08-03 NOTE — DISCHARGE SUMMARY
This note has been moved to another encounter. If you have any questions, please contact HIM Chart Correction at [phone].

## 2024-08-03 NOTE — H&P
[facility] Hospital Medicine H&P Note     Admitting Team: [facility] Hospitalist Team B  Attending Physician: Vinod Elise MD  Resident: Dirk Condon   Intern: Dutch Huynh      Date of Admit: 8/2/2024    Chief Complaint     Acute Kidney Injury     Subjective:      History of Present Illness:  Saji Castañeda is a 74 y.o.  male who  has a past medical history of Arthritis, Bacteremia due to Gram-negative bacteria (3/23/2021), Diabetes mellitus, Diabetes mellitus, type 2, Hyperlipidemia, Hypertension, Osteomyelitis, and Palliative care encounter (5/24/2023).    Patient was initially Admitted to [facility] at [facility] on 7/25/24 with concern for acute encephalopathy. A the time, CT head was negative, electrolyte imbalances were noted and corrected, and with known history of Osteomyelitis (Was on Vanc and Cefepime for pseudomonas and MRSA), transitioned off cefepime to Zosyn in case mental status was related to cefepime induced neurotoxicity. Despite addressing above medical needs, minimal improvement in mental status was noted. Neurology had recommended MRI of Brain and Spine to further evaluate. Given patient's particular permanent pacemaker, patient needed to go to [facility] to obtian MRI. Patient was transferred to [facility] on 7/30/2024. Upon arrival to [facility], Mental status began to improve and MRI was performed which showed no acute infarcation or hydrocephalus with multilevel degenerative changes noted in the cervical spine with probable moderate central canal stenosis and questionable severe neural foraminal stenosis. Patient was noted to have a significant YNES on 7/31 (Cr 1.2 -> 1.6) Nephrology was consulted and thought YNES likely related to Vancomycin (had several supra therapeutic troughs during hospitalization). Cr plateaued at 1.8 on 8/2/24 and given clinical stability, was transferred back to [facility] for continued medical management.      Past Medical  History:  Past Medical History:   Diagnosis Date    Arthritis     legs    Bacteremia due to Gram-negative bacteria 3/23/2021    Diabetes mellitus     Diabetes mellitus, type 2     Hyperlipidemia     Hypertension     Osteomyelitis     Palliative care encounter 5/24/2023       Past Surgical History:  Past Surgical History:   Procedure Laterality Date    ABOVE-KNEE AMPUTATION Left 3/27/2024    Procedure: AMPUTATION, ABOVE KNEE;  Surgeon: Adal Arellano MD;  Location: [address];  Service: Vascular;  Laterality: Left;    ANGIOGRAPHY OF LOWER EXTREMITY N/A 2/3/2021    Procedure: Angiogram Extremity Bilateral;  Surgeon: Ernst Chacko MD;  Location: [address];  Service: Peripheral Vascular;  Laterality: N/A;  7.4 mintues fluoroscopy time  816.15 mGy  170.17 Gycm2    AORTOGRAPHY WITH EXTREMITY RUNOFF Bilateral 2/3/2021    Procedure: AORTOGRAM, WITH EXTREMITY RUNOFF;  Surgeon: Ernst Chacko MD;  Location: [address];  Service: Peripheral Vascular;  Laterality: Bilateral;    DEBRIDEMENT OF FOOT Left 2/23/2021    Procedure: DEBRIDEMENT, LEFT HEEL;  Surgeon: Mayra Schroeder DPM;  Location: [address];  Service: Podiatry;  Laterality: Left;    ESOPHAGOGASTRODUODENOSCOPY N/A 6/28/2024    Procedure: EGD (ESOPHAGOGASTRODUODENOSCOPY);  Surgeon: Adal Chandra MD;  Location: [facility] ENDO;  Service: Gastroenterology;  Laterality: N/A;    FOOT AMPUTATION  October 2010    left high midfoot amputation    IMPLANTATION OF LEADLESS PACEMAKER N/A 10/12/2023    Procedure: [id number];  Surgeon: VANESSA Delatorre MD;  Location: [facility] EP LAB;  Service: Cardiology;  Laterality: N/A;  AVB, MICRA, EH, [place]ES, [place] [id number]       Allergies:  Review of patient's allergies indicates:  No Known Allergies    Home Medications:  Prior to Admission medications    Medication Sig Start Date End Date Taking? Authorizing Provider   acetaminophen (TYLENOL) 325 MG tablet Take 650 mg by  "mouth every 6 (six) hours as needed for Pain.    Provider, Historical   apixaban (ELIQUIS) 5 mg Tab Take 1 tablet (5 mg total) by mouth 2 (two) times daily. 4/3/24   Jori Byrd MD   ascorbic acid, vitamin C, (VITAMIN C) 500 MG tablet Take 500 mg by mouth 2 (two) times daily.    Provider, Historical   BD AUTOSHIELD DUO PEN NEEDLE 30 gauge x 3/16" Ndle  11/3/23   Provider, Historical   BD ULTRA-FINE ADITYA PEN NEEDLE 32 gauge x 5/32" Ndle USE FOUR TIMES DAILY WITH MEALS AND NIGHTLY 11/20/19   Miriam Duong NP   blood sugar diagnostic (CONTOUR TEST STRIPS) Strp 1 strip by Misc.(Non-Drug; Combo Route) route 4 (four) times daily. Use to check blood glucose 4x daily 4/5/24   Jori Byrd MD   D5W SolP 250 mL with vancomycin 1.25 gram SolR 1,250 mg Inject 1,250 mg into the vein Daily. 7/11/24   Lo Melgar, SHAYNE   ergocalciferol (ERGOCALCIFEROL) 50,000 unit Cap Take 1 capsule (50,000 Units total) by mouth Every Friday. for 10 doses 8/2/24 10/5/24  Dirk Condon MD   ferrous sulfate 325 (65 FE) MG EC tablet Take 325 mg by mouth 2 (two) times daily.    Provider, Historical   furosemide (LASIX) 20 MG tablet Take 1 tablet (20 mg total) by mouth once daily. 4/3/24 4/3/25  Jori Byrd MD   gabapentin (NEURONTIN) 300 MG capsule Take 300 mg by mouth 2 (two) times daily. 11/3/23   Provider, Historical   insulin aspart U-100 (NOVOLOG) 100 unit/mL (3 mL) InPn pen Inject 5 Units into the skin 3 (three) times daily with meals. 4/5/24 4/5/25  Jori Byrd MD   insulin aspart U-100 (NOVOLOG) 100 unit/mL injection Inject 2-10 Units into the skin 3 (three) times daily before meals. 0-149=0  150-200: 2 units  201-250: 4 units  251-300: 6 units  301-350: 8 units  351-1000=10 units, JENNIFER GOMEZ    Provider, Historical   lancets (MICROLET LANCET) Misc 1 each by Misc.(Non-Drug; Combo Route) route 4 (four) times daily. Use to check blood sugars 4x daily 4/5/24   Jori Byrd MD LANTUS SOLOSTAR U-100 INSULIN 100 " unit/mL (3 mL) InPn pen Inject 12 Units into the skin 2 (two) times a day. 6/21/24   Provider, Historical   menthol-zinc oxide (CALMOSEPTINE) 0.44-20.6 % Oint Apply topically as needed.    Provider, Historical   multivitamin (THERAGRAN) per tablet Take 1 tablet by mouth once daily.    Provider, Historical   NIFEdipine (PROCARDIA-XL) 90 MG (OSM) 24 hr tablet Take 1 tablet (90 mg total) by mouth once daily. 4/4/24 4/4/25  Jori Byrd MD   oxyCODONE (ROXICODONE) 5 MG immediate release tablet Take 1 tablet (5 mg total) by mouth every 6 (six) hours as needed for Pain. 5/29/24   Nael Diallo MD   pantoprazole (PROTONIX) 40 MG tablet Take 40 mg by mouth once daily. Before breakfast 2/6/23   Provider, Historical   potassium chloride SA (K-DUR,KLOR-CON) 20 MEQ tablet Take 20 mEq by mouth 2 (two) times daily. 7/22/24   Provider, Historical   povidone-iodine (BETADINE) 10 % external solution Apply topically as needed for Wound Care.    Provider, Historical   SANTYL ointment Apply topically once daily. unknown 6/7/24   Provider, Historical   tamsulosin (FLOMAX) 0.4 mg Cap Take 0.4 mg by mouth once daily.    Provider, Historical   TRUE METRIX GLUCOSE METER [facility]  2/14/24   Provider, Historical   zinc sulfate (ZINCATE) 50 mg zinc (220 mg) capsule Take 220 mg by mouth every morning.    Provider, Historical   hydroCHLOROthiazide (HYDRODIURIL) 25 MG tablet Take 0.5 tablets (12.5 mg total) by mouth every Mon, Wed, Fri. Beginning on 7/4/2022 7/4/22 7/8/22  Keerthi Mayorga MD       Family History:  Family History   Problem Relation Name Age of Onset    Diabetes Mother      Heart disease Mother      Stroke Sister      Cancer Brother      Diabetes Sister         Social History:  Social History     Tobacco Use    Smoking status: Never    Smokeless tobacco: Never   Substance Use Topics    Alcohol use: No     Comment: occassional    Drug use: No       Review of Systems:  Pertinent items are noted in HPI. All other systems are  reviewed and are negative.    Health Maintaince :   Primary Care Physician: Nael Diallo MD     Immunizations:   TDap Not UTD     Flu: UTD  Pna UTD   Covid UTD    Cancer Screening:  Colonoscopy: Not UTD        Objective:   Last 24 Hour Vital Signs:  BP  Min: 131/66  Max: 178/74  Temp  Av.5 °F (36.9 °C)  Min: 97.9 °F (36.6 °C)  Max: 99 °F (37.2 °C)  Pulse  Av.3  Min: 52  Max: 83  Resp  Av.5  Min: 16  Max: 18  SpO2  Av.4 %  Min: 98 %  Max: 100 %  Weight  Av kg (176 lb 5.9 oz)  Min: 80 kg (176 lb 5.9 oz)  Max: 80 kg (176 lb 5.9 oz)  Body mass index is 25.31 kg/m².  No intake/output data recorded.    Physical Examination:  Physical Exam  Constitutional:       General: He is not in acute distress.     Appearance: He is not ill-appearing.   HENT:      Head: Normocephalic and atraumatic.      Right Ear: External ear normal.      Left Ear: External ear normal.      Nose: Nose normal.   Eyes:      General:         Right eye: No discharge.         Left eye: No discharge.      Extraocular Movements: Extraocular movements intact.   Cardiovascular:      Rate and Rhythm: Normal rate and regular rhythm.      Pulses: Normal pulses.      Heart sounds: Normal heart sounds. No murmur heard.  Pulmonary:      Effort: Pulmonary effort is normal.      Breath sounds: Normal breath sounds.   Abdominal:      Palpations: Abdomen is soft.      Tenderness: There is no abdominal tenderness. There is no guarding or rebound.   Genitourinary:     Comments: Sams cath in place draining clear, yellow urine   Musculoskeletal:      Comments: Left AKA.    Skin:     Comments: R foot Ulcer wrapped and in boot. Stage 4 Sacral ulcer present.    Neurological:      Mental Status: He is alert.      Comments: Patient is oriented and conversant. Answering questions appropriately.             Laboratory:  Most Recent Data:  CBC:   Lab Results   Component Value Date    WBC 16.84 (H) 2024    HGB 8.3 (L) 2024    HCT 26.8 (L)  08/01/2024     08/01/2024    MCV 86 08/01/2024    RDW 18.1 (H) 08/01/2024     BMP:   Lab Results   Component Value Date     08/02/2024    K 4.6 08/02/2024     08/02/2024    CO2 28 08/02/2024    BUN 24 (H) 08/02/2024    CREATININE 1.8 (H) 08/02/2024    GLU 71 08/02/2024    CALCIUM 8.1 (L) 08/02/2024    MG 1.8 08/02/2024    PHOS 4.7 (H) 08/02/2024     LFTs:   Lab Results   Component Value Date    PROT 5.9 (L) 07/29/2024    ALBUMIN 1.6 (L) 08/02/2024    BILITOT 0.2 07/29/2024    AST 14 07/29/2024    ALKPHOS 69 07/29/2024    ALT 9 (L) 07/29/2024     Coags:   Lab Results   Component Value Date    INR 1.3 (H) 05/19/2024     FLP:   Lab Results   Component Value Date    CHOL 108 (L) 07/25/2024    HDL 43 07/25/2024    LDLCALC 54.6 (L) 07/25/2024    TRIG 52 07/25/2024    CHOLHDL 39.8 07/25/2024     DM:   Lab Results   Component Value Date    HGBA1C 6.0 (H) 07/25/2024    HGBA1C 6.8 (H) 06/26/2024    HGBA1C >14.0 (H) 03/05/2024    LDLCALC 54.6 (L) 07/25/2024    CREATININE 1.8 (H) 08/02/2024     Thyroid:   Lab Results   Component Value Date    TSH 1.563 07/25/2024    FREET4 0.92 03/20/2023     Anemia:   Lab Results   Component Value Date    IRON 46 06/27/2024    TIBC 107 (L) 06/27/2024    FERRITIN 697 (H) 06/27/2024    PZKDJPFT06 504 03/17/2024    FOLATE 4.2 02/19/2021     Cardiac:   Lab Results   Component Value Date    TROPONINI 0.021 07/25/2024     (H) 07/25/2024     Urinalysis:   Lab Results   Component Value Date    LABURIN No growth 07/25/2024    COLORU Yellow 07/25/2024    CLARITYU Slightly Cloudy 04/22/2022    SPECGRAV 1.015 07/25/2024    NITRITE Negative 07/25/2024    KETONESU Negative 07/25/2024    UROBILINOGEN Negative 07/25/2024    WBCUA 44 (H) 07/25/2024       Trended Lab Data:  Recent Labs   Lab 07/27/24  0325 07/27/24  1328 07/28/24  0723 07/28/24  0724 07/29/24  0103 07/30/24  0455 07/31/24  0348 08/01/24  0516 08/02/24  0522   WBC 16.18*  --   --  12.26 9.07  --   --  16.84*  --    HGB  10.4*  --   --  8.7* 8.2*  --   --  8.3*  --    HCT 34.2*  --   --  28.3* 26.6*  --   --  26.8*  --      --   --  345 298  --   --  316  --    MCV 86  --   --  86 85  --   --  86  --    RDW 18.6*  --   --  18.2* 18.3*  --   --  18.1*  --       < > 144  --  141   < > 140 141 141   K 3.0*   < > 3.5  --  3.3*   < > 4.6 5.0 4.6      < > 110  --  110   < > 107 106 106   CO2 23   < > 25  --  24   < > 27 27 28   BUN 17   < > 22  --  33*   < > 22 24* 24*   CREATININE 1.2   < > 1.3  --  1.3   < > 1.6* 1.8* 1.8*      < > 102  --  115*   < > 84 103 71   PROT 7.9  --  6.9  --  5.9*  --   --   --   --    ALBUMIN 1.9*  --  1.7*  --  1.5*   < > 1.4* 1.6* 1.6*   BILITOT 0.4  --  0.2  --  0.2  --   --   --   --    AST 16  --  14  --  14  --   --   --   --    ALKPHOS 89  --  82  --  69  --   --   --   --    ALT 9*  --  10  --  9*  --   --   --   --     < > = values in this interval not displayed.       Microbiology Data:  Blood Cultures 7/25/24 Completed Negative   Urine Cultures 7/25/24 Completed Negative     R Calcaneus Bone Culture: + for MRSA and Pseudomonas     Other Results:  EKG (my interpretation): No recent EKG       Radiology:    MRI Cervical Spine and Brain w/o Contrast   Impression:     Examinations are significant degraded by motion artifacts.     MRI brain: Cerebral volume loss with few punctate foci of T2 FLAIR signal abnormality supratentorial white matter which are nonspecific and may be sequela of chronic ischemic change     Small susceptibility within the central midbrain which may represent remote microhemorrhage with capillary telangiectasia to be considered in differential.     No evidence for acute infarction or hydrocephalus.     MRI cervical spine: Multilevel degenerative changes of cervical spine difficult to accurately evaluate secondary to significant motion artifact and early termination of examination.  Multilevel degenerative changes allowing for artifacts most pronounced at  C3/C4 with posterior disc osteophyte, ligamentum flavum hypertrophy, uncovertebral joint hypertrophy and facet arthropathy with probable moderate central canal stenosis and questionable severe neural foraminal stenosis.    Assessment:     Saji Castañeda is a 75 YO M With a PMH of IDDM2, PVD, BPH with chronic fung, parxoysmal A fib, CHB, HTN, and recent R foot Osteomyelitis, who initially presented won 7/25/24 for acute encephalopathy who was transferred to [facility] for [facility] on 7/2924, with now improving mental status, but new acute kidney injury and [place] [facility] [place].     Plan:     #Acute Encephalopathy, Resolving   Initially presented with expressive aphasia and bilateral upper extremity tremors. CT head, MRI Brain/C Spine negative for an acute process, and now significantly improved mental status. Thought may be related to cefepime induced neurotoxicity.   - Appears close to baseline now   - Will discuss with neurology in AM on any additional FU given their recommendation for MRI   - Avoid Cefepime. Transitioned to Zosyn     #Acute Kidney Injury   BL Cr of ~1.2, trended up 1.6 -> 1.8. Now Plateaued. Thought may be related to vancomycin induced nephrotoxicity given earlier supratherapeutic troughs.   - Trend Cr, given plateau anticipate improvement   - Continue to monitor vancomycin troughs and dose appropriately   - If continues to be problematic, could consider discussion with ID on transition to alternative antibiotics   - If no improvement, will consult nephrology for further evaluation    #Osteomyelitis of RLE   R heel osteomyelitis diagnosed during admission on 6/26/24 - 7/11/24. Bone culture positive for pseudomonas and MRSA. Initially discharged on Vanc and Cefepime for 6 week course. Transitioned to Vanc Zosyn this admission given concern for cefepime neurotoxicity.   - Continue Vanc and Zosyn   - Pharmacy consulted for Vancomycin dosing   - Continue wound care  recommendations   - Can discuss with ID antibiotic choice, if YNES remains problematic per above     #DM2 w/ long-term insulin use   Last A1c  6.0% on 7/25/24. Home regimen is Lantus 12 U BID/Novolog 5U w/ meals  - Current Insulin regimen    - Basal Insulin - Lantus 6u BID    - Low Dose SSI with meals   - POC Glucose with meals and at bedtime   - BG Goal of 140 - 180 while in patient   - Adjust as needed, ideally ~ 50/50 basal/bolus     #Paroxysmal Atrial Fibrillation   - Continue eliquis 5 mg BID     #HTN  - Continue amlodipine 5 mg   - Given improvement in mental status, could transition back to home med of nifedipine 90 mg     #BPHD   #Urinary Retention with long standing fung cath   - Continue tamsulosin 0.4 mg     #Anemia of Chronic Disease   - Trend with CBC   - Transfuse for Hgb <7    Code: Full   TLD: L Brachial PICC, Fung Cath (Chronic),   Diet: Diabetic   DVT Ppx: Johnny Hoffman MD  [facility]/Pediatrics PGY-3    [facility] Medicine Hospitalist Pager numbers:   [facility] Hospitalist Medicine Team A (Lorne/Riki): [phone]  [facility] Hospitalist Medicine Team B (Arik/Lucio):  [phone]

## 2024-08-04 LAB
ALBUMIN SERPL BCP-MCNC: 1.6 G/DL (ref 3.5–5.2)
ALP SERPL-CCNC: 60 U/L (ref 55–135)
ALT SERPL W/O P-5'-P-CCNC: 9 U/L (ref 10–44)
ANION GAP SERPL CALC-SCNC: 9 MMOL/L (ref 8–16)
AST SERPL-CCNC: 11 U/L (ref 10–40)
BILIRUB SERPL-MCNC: <0.1 MG/DL (ref 0.1–1)
BUN SERPL-MCNC: 17 MG/DL (ref 8–23)
CALCIUM SERPL-MCNC: 8.2 MG/DL (ref 8.7–10.5)
CHLORIDE SERPL-SCNC: 106 MMOL/L (ref 95–110)
CK SERPL-CCNC: 17 U/L (ref 20–200)
CO2 SERPL-SCNC: 27 MMOL/L (ref 23–29)
CREAT SERPL-MCNC: 1.7 MG/DL (ref 0.5–1.4)
ERYTHROCYTE [DISTWIDTH] IN BLOOD BY AUTOMATED COUNT: 18 % (ref 11.5–14.5)
EST. GFR  (NO RACE VARIABLE): 42 ML/MIN/1.73 M^2
GLUCOSE SERPL-MCNC: 117 MG/DL (ref 70–110)
HCT VFR BLD AUTO: 27 % (ref 40–54)
HGB BLD-MCNC: 8.3 G/DL (ref 14–18)
MAGNESIUM SERPL-MCNC: 1.7 MG/DL (ref 1.6–2.6)
MCH RBC QN AUTO: 26.1 PG (ref 27–31)
MCHC RBC AUTO-ENTMCNC: 30.7 G/DL (ref 32–36)
MCV RBC AUTO: 85 FL (ref 82–98)
PHOSPHATE SERPL-MCNC: 4.3 MG/DL (ref 2.7–4.5)
PLATELET # BLD AUTO: 363 K/UL (ref 150–450)
PMV BLD AUTO: 8.8 FL (ref 9.2–12.9)
POCT GLUCOSE: 116 MG/DL (ref 70–110)
POCT GLUCOSE: 131 MG/DL (ref 70–110)
POCT GLUCOSE: 133 MG/DL (ref 70–110)
POCT GLUCOSE: 192 MG/DL (ref 70–110)
POCT GLUCOSE: 218 MG/DL (ref 70–110)
POTASSIUM SERPL-SCNC: 4.2 MMOL/L (ref 3.5–5.1)
PROT SERPL-MCNC: 6.4 G/DL (ref 6–8.4)
RBC # BLD AUTO: 3.18 M/UL (ref 4.6–6.2)
SODIUM SERPL-SCNC: 142 MMOL/L (ref 136–145)
VANCOMYCIN SERPL-MCNC: 17 UG/ML
WBC # BLD AUTO: 9.41 K/UL (ref 3.9–12.7)

## 2024-08-04 PROCEDURE — 85027 COMPLETE CBC AUTOMATED: CPT

## 2024-08-04 PROCEDURE — 82550 ASSAY OF CK (CPK): CPT

## 2024-08-04 PROCEDURE — 25000003 PHARM REV CODE 250

## 2024-08-04 PROCEDURE — 83735 ASSAY OF MAGNESIUM: CPT

## 2024-08-04 PROCEDURE — 63600175 PHARM REV CODE 636 W HCPCS: Performed by: INTERNAL MEDICINE

## 2024-08-04 PROCEDURE — 36415 COLL VENOUS BLD VENIPUNCTURE: CPT | Performed by: INTERNAL MEDICINE

## 2024-08-04 PROCEDURE — 25000003 PHARM REV CODE 250: Performed by: INTERNAL MEDICINE

## 2024-08-04 PROCEDURE — 11000001 HC ACUTE MED/SURG PRIVATE ROOM

## 2024-08-04 PROCEDURE — 80053 COMPREHEN METABOLIC PANEL: CPT

## 2024-08-04 PROCEDURE — 84100 ASSAY OF PHOSPHORUS: CPT

## 2024-08-04 PROCEDURE — 36415 COLL VENOUS BLD VENIPUNCTURE: CPT

## 2024-08-04 PROCEDURE — 80202 ASSAY OF VANCOMYCIN: CPT | Performed by: INTERNAL MEDICINE

## 2024-08-04 PROCEDURE — 99232 SBSQ HOSP IP/OBS MODERATE 35: CPT | Mod: ,,, | Performed by: INTERNAL MEDICINE

## 2024-08-04 PROCEDURE — 27000207 HC ISOLATION

## 2024-08-04 PROCEDURE — 36415 COLL VENOUS BLD VENIPUNCTURE: CPT | Mod: XB

## 2024-08-04 PROCEDURE — 63600175 PHARM REV CODE 636 W HCPCS

## 2024-08-04 RX ADMIN — PIPERACILLIN SODIUM AND TAZOBACTAM SODIUM 4.5 G: 4; .5 INJECTION, POWDER, LYOPHILIZED, FOR SOLUTION INTRAVENOUS at 03:08

## 2024-08-04 RX ADMIN — APIXABAN 5 MG: 5 TABLET, FILM COATED ORAL at 09:08

## 2024-08-04 RX ADMIN — INSULIN ASPART 2 UNITS: 100 INJECTION, SOLUTION INTRAVENOUS; SUBCUTANEOUS at 12:08

## 2024-08-04 RX ADMIN — TAMSULOSIN HYDROCHLORIDE 0.4 MG: 0.4 CAPSULE ORAL at 09:08

## 2024-08-04 RX ADMIN — GABAPENTIN 100 MG: 100 CAPSULE ORAL at 09:08

## 2024-08-04 RX ADMIN — PIPERACILLIN SODIUM AND TAZOBACTAM SODIUM 4.5 G: 4; .5 INJECTION, POWDER, LYOPHILIZED, FOR SOLUTION INTRAVENOUS at 09:08

## 2024-08-04 RX ADMIN — WHITE PETROLATUM: 1.75 OINTMENT TOPICAL at 08:08

## 2024-08-04 RX ADMIN — MUPIROCIN: 20 OINTMENT TOPICAL at 09:08

## 2024-08-04 RX ADMIN — AMLODIPINE BESYLATE 5 MG: 5 TABLET ORAL at 09:08

## 2024-08-04 RX ADMIN — PIPERACILLIN SODIUM AND TAZOBACTAM SODIUM 4.5 G: 4; .5 INJECTION, POWDER, LYOPHILIZED, FOR SOLUTION INTRAVENOUS at 05:08

## 2024-08-04 RX ADMIN — INSULIN GLARGINE 6 UNITS: 100 INJECTION, SOLUTION SUBCUTANEOUS at 09:08

## 2024-08-04 RX ADMIN — PANTOPRAZOLE SODIUM 40 MG: 40 TABLET, DELAYED RELEASE ORAL at 09:08

## 2024-08-04 RX ADMIN — POTASSIUM CHLORIDE 20 MEQ: 1500 TABLET, EXTENDED RELEASE ORAL at 05:08

## 2024-08-04 RX ADMIN — VANCOMYCIN HYDROCHLORIDE 500 MG: 500 INJECTION, POWDER, LYOPHILIZED, FOR SOLUTION INTRAVENOUS at 07:08

## 2024-08-04 RX ADMIN — POTASSIUM & SODIUM PHOSPHATES POWDER PACK 280-160-250 MG 2 PACKET: 280-160-250 PACK at 05:08

## 2024-08-04 NOTE — SUBJECTIVE & OBJECTIVE
Past Medical History:   Diagnosis Date    Arthritis     legs    Bacteremia due to Gram-negative bacteria 3/23/2021    Diabetes mellitus     Diabetes mellitus, type 2     Hyperlipidemia     Hypertension     Osteomyelitis     Palliative care encounter 5/24/2023       Past Surgical History:   Procedure Laterality Date    ABOVE-KNEE AMPUTATION Left 3/27/2024    Procedure: AMPUTATION, ABOVE KNEE;  Surgeon: Adal Arellano MD;  Location: [address];  Service: Vascular;  Laterality: Left;    ANGIOGRAPHY OF LOWER EXTREMITY N/A 2/3/2021    Procedure: Angiogram Extremity Bilateral;  Surgeon: Ernst Chacko MD;  Location: [address];  Service: Peripheral Vascular;  Laterality: N/A;  7.4 mintues fluoroscopy time  816.15 mGy  170.17 Gycm2    AORTOGRAPHY WITH EXTREMITY RUNOFF Bilateral 2/3/2021    Procedure: AORTOGRAM, WITH EXTREMITY RUNOFF;  Surgeon: Ernst Chacko MD;  Location: [address];  Service: Peripheral Vascular;  Laterality: Bilateral;    DEBRIDEMENT OF FOOT Left 2/23/2021    Procedure: DEBRIDEMENT, LEFT HEEL;  Surgeon: Mayra Schroeder DPM;  Location: [address];  Service: Podiatry;  Laterality: Left;    ESOPHAGOGASTRODUODENOSCOPY N/A 6/28/2024    Procedure: EGD (ESOPHAGOGASTRODUODENOSCOPY);  Surgeon: Adal Chandra MD;  Location: [facility] ENDO;  Service: Gastroenterology;  Laterality: N/A;    FOOT AMPUTATION  October 2010    left high midfoot amputation    IMPLANTATION OF LEADLESS PACEMAKER N/A 10/12/2023    Procedure: [id number];  Surgeon: VANESSA Delatorre MD;  Location: [facility] EP LAB;  Service: Cardiology;  Laterality: N/A;  AVB, MI[place], EH, ANES, [place] [id number]       Review of patient's allergies indicates:  No Known Allergies    Medications:  Medications Prior to Admission   Medication Sig    acetaminophen (TYLENOL) 325 MG tablet Take 650 mg by mouth every 6 (six) hours as needed for Pain.    apixaban (ELIQUIS) 5 mg Tab Take 1 tablet (5 mg  "total) by mouth 2 (two) times daily.    ascorbic acid, vitamin C, (VITAMIN C) 500 MG tablet Take 500 mg by mouth 2 (two) times daily.    D5W SolP 250 mL with vancomycin 1.25 gram SolR 1,250 mg Inject 1,250 mg into the vein Daily.    ferrous sulfate 325 (65 FE) MG EC tablet Take 325 mg by mouth 2 (two) times daily.    furosemide (LASIX) 20 MG tablet Take 1 tablet (20 mg total) by mouth once daily.    gabapentin (NEURONTIN) 300 MG capsule Take 300 mg by mouth 2 (two) times daily.    insulin aspart U-100 (NOVOLOG) 100 unit/mL (3 mL) InPn pen Inject 5 Units into the skin 3 (three) times daily with meals.    insulin aspart U-100 (NOVOLOG) 100 unit/mL injection Inject 2-10 Units into the skin 3 (three) times daily before meals. 0-149=0  150-200: 2 units  201-250: 4 units  251-300: 6 units  301-350: 8 units  351-1000=10 units, NOTIFY MD NILDA KING U-100 INSULIN 100 unit/mL (3 mL) InPn pen Inject 12 Units into the skin 2 (two) times a day.    menthol-zinc oxide (CALMOSEPTINE) 0.44-20.6 % Oint Apply topically as needed.    multivitamin (THERAGRAN) per tablet Take 1 tablet by mouth once daily.    NIFEdipine (PROCARDIA-XL) 90 MG (OSM) 24 hr tablet Take 1 tablet (90 mg total) by mouth once daily.    oxyCODONE (ROXICODONE) 5 MG immediate release tablet Take 1 tablet (5 mg total) by mouth every 6 (six) hours as needed for Pain.    pantoprazole (PROTONIX) 40 MG tablet Take 40 mg by mouth once daily. Before breakfast    potassium chloride SA (K-DUR,KLOR-CON) 20 MEQ tablet Take 20 mEq by mouth 2 (two) times daily.    povidone-iodine (BETADINE) 10 % external solution Apply topically as needed for Wound Care.    SANTYL ointment Apply topically once daily. unknown    tamsulosin (FLOMAX) 0.4 mg Cap Take 0.4 mg by mouth once daily.    zinc sulfate (ZINCATE) 50 mg zinc (220 mg) capsule Take 220 mg by mouth every morning.    BD AUTOSHIELD DUO PEN NEEDLE 30 gauge x 3/16" Ndle     BD ULTRA-FINE ADITYA PEN NEEDLE 32 gauge x 5/32" Ndle " "USE FOUR TIMES DAILY WITH MEALS AND NIGHTLY    blood sugar diagnostic (CONTOUR TEST STRIPS) Strp 1 strip by Misc.(Non-Drug; Combo Route) route 4 (four) times daily. Use to check blood glucose 4x daily    ergocalciferol (ERGOCALCIFEROL) 50,000 unit Cap Take 1 capsule (50,000 Units total) by mouth Every Friday. for 10 doses    lancets (MICROLET LANCET) Misc 1 each by Misc.(Non-Drug; Combo Route) route 4 (four) times daily. Use to check blood sugars 4x daily    TRUE METRIX GLUCOSE METER Misc      Antibiotics (From admission, onward)      Start     Stop Route Frequency Ordered    08/03/24 0900  mupirocin 2 % ointment         08/08/24 0859 Nasl 2 times daily 08/02/24 2331    08/03/24 0145  piperacillin-tazobactam (ZOSYN) 4.5 g in D5W 100 mL IVPB (MB+)         -- IV Every 8 hours (non-standard times) 08/02/24 2329 08/02/24 2328  vancomycin - pharmacy to dose  (vancomycin IVPB (PEDS and ADULTS))        Placed in "And" Linked Group    -- IV pharmacy to manage frequency 08/02/24 2329          Antifungals (From admission, onward)      None          Antivirals (From admission, onward)      None             Immunization History   Administered Date(s) Administered    COVID-19, MRNA, LN-S, PF (MODERNA FULL 0.5 ML DOSE) 05/24/2021, 05/24/2021, 07/13/2021, 07/13/2021    Influenza (FLUAD) - Quadrivalent - Adjuvanted - PF *Preferred* (65+) 12/21/2023    Influenza - Quadrivalent - PF *Preferred* (6 months and older) 10/08/2020    Influenza - Trivalent (ADULT) 10/08/2020, 09/21/2021, 09/15/2022    PPD Test 12/09/2020, 03/02/2021, 06/21/2022, 09/19/2023, 03/18/2024    Pneumococcal Conjugate - 13 Valent 10/16/2018, 10/16/2018       Family History       Problem Relation (Age of Onset)    Cancer Brother    Diabetes Mother, Sister    Heart disease Mother    Stroke Sister          Social History     Socioeconomic History    Marital status: Single   Tobacco Use    Smoking status: Never    Smokeless tobacco: Never   Substance and Sexual " Activity    Alcohol use: No     Comment: occassional    Drug use: No    Sexual activity: Not Currently   Social History Narrative    Not currently working; lives with family     Social Determinants of Health     Financial Resource Strain: Low Risk  (7/30/2024)    Overall Financial Resource Strain (CARDIA)     Difficulty of Paying Living Expenses: Not very hard   Food Insecurity: Patient Unable To Answer (7/30/2024)    Hunger Vital Sign     Worried About Running Out of Food in the Last Year: Patient unable to answer     Ran Out of Food in the Last Year: Patient unable to answer   Transportation Needs: Patient Unable To Answer (7/30/2024)    TRANSPORTATION NEEDS     Transportation : Patient unable to answer   Physical Activity: Inactive (7/30/2024)    Exercise Vital Sign     Days of Exercise per Week: 0 days     Minutes of Exercise per Session: 0 min   Stress: Patient Unable To Answer (7/30/2024)    [employer] [place] [employer] - Occupational Stress Questionnaire     Feeling of Stress : Patient unable to answer   Housing Stability: Patient Unable To Answer (7/30/2024)    Housing Stability Vital Sign     Unable to Pay for Housing in the Last Year: Patient unable to answer     Homeless in the Last Year: Patient unable to answer     Review of Systems   Unable to perform ROS: Other     Objective:     Vital Signs (Most Recent):  Temp: 98.6 °F (37 °C) (08/04/24 1217)  Pulse: 110 (08/04/24 1217)  Resp: 18 (08/04/24 1217)  BP: (!) 162/70 (08/04/24 1217)  SpO2: 98 % (08/04/24 1217) Vital Signs (24h Range):  Temp:  [97.6 °F (36.4 °C)-98.6 °F (37 °C)] 98.6 °F (37 °C)  Pulse:  [] 110  Resp:  [17-20] 18  SpO2:  [97 %-100 %] 98 %  BP: (136-174)/(60-71) 162/70     Weight: 80 kg (176 lb 5.9 oz)  Body mass index is 25.31 kg/m².    Estimated Creatinine Clearance: 38.8 mL/min (A) (based on SCr of 1.7 mg/dL (H)).     Physical Exam  Vitals and nursing note reviewed.   Constitutional:       General: He is not in acute  distress.     Appearance: He is ill-appearing.   HENT:      Head: Atraumatic.      Mouth/Throat:      Mouth: Mucous membranes are moist.   Eyes:      Extraocular Movements: Extraocular movements intact.   Cardiovascular:      Rate and Rhythm: Normal rate and regular rhythm.      Heart sounds: No murmur heard.  Pulmonary:      Effort: Pulmonary effort is normal. No respiratory distress.      Breath sounds: Normal breath sounds. No wheezing.   Abdominal:      General: Abdomen is flat.      Palpations: Abdomen is soft.   Musculoskeletal:      Right lower leg: No edema.      Comments: L SHERMAN  R heel with bandage in place, soft boot    Skin:     General: Skin is warm and dry.      Coloration: Skin is not jaundiced.   Neurological:      Mental Status: He is alert and oriented to person, place, and time.          Significant Labs: All pertinent labs within the past 24 hours have been reviewed.    Significant Imaging: I have reviewed all pertinent imaging results/findings within the past 24 hours.

## 2024-08-04 NOTE — PROGRESS NOTES
Pharmacokinetic Assessment Follow Up: IV Vancomycin    Vancomycin serum concentration assessment(s):    The random level was drawn correctly and can be used to guide therapy at this time. The measurement is within the desired definitive target range of 15 to 20 mcg/mL.    Vancomycin Regimen Plan:    Re-dose when the random level is less than 20 mcg/mL, next level to be drawn at 8/5 on 0400    Drug levels (last 3 results):  Recent Labs   Lab Result Units 08/02/24  1602 08/03/24  0306 08/04/24  0442   Vancomycin, Random ug/mL 21.6 18.8 17.0       Pharmacy will continue to follow and monitor vancomycin.    Please contact pharmacy at extension [phone] for questions regarding this assessment.    Thank you for the consult,   Carmine Ortiz       Patient brief summary:  Saji Castañeda is a 74 y.o. male initiated on antimicrobial therapy with IV Vancomycin for treatment of bone/joint infection    The patient's current regimen is pulse dose     Drug Allergies:   Review of patient's allergies indicates:  No Known Allergies    Actual Body Weight:   80kg    Renal Function:   Estimated Creatinine Clearance: 38.8 mL/min (A) (based on SCr of 1.7 mg/dL (H)).,     Dialysis Method (if applicable):  N/A    CBC (last 72 hours):  Recent Labs   Lab Result Units 08/04/24  0335   WBC K/uL 9.41   Hemoglobin g/dL 8.3*   Hematocrit % 27.0*   Platelets K/uL 363       Metabolic Panel (last 72 hours):  Recent Labs   Lab Result Units 08/01/24  1359 08/02/24  0522 08/03/24  0306 08/04/24  0335   Sodium mmol/L  --  141 141 142   Sodium, Urine mmol/L 55  --   --   --    Potassium mmol/L  --  4.6 4.3 4.2   Chloride mmol/L  --  106 106 106   CO2 mmol/L  --  28 22* 27   Glucose mg/dL  --  71 155* 117*   BUN mg/dL  --  24* 22 17   Creatinine mg/dL  --  1.8* 1.9* 1.7*   Creatinine, Urine mg/dL 61.0  --   --   --    Albumin g/dL  --  1.6* 1.7* 1.6*   Total Bilirubin mg/dL  --   --   --  <0.1*   Alkaline Phosphatase U/L  --   --   --  60   AST U/L  --   --    --  11   ALT U/L  --   --   --  9*   Magnesium mg/dL  --  1.8 1.8 1.7   Phosphorus mg/dL  --  4.7* 4.7* 4.3       Vancomycin Administrations:  vancomycin given in the last 96 hours                     vancomycin (VANCOCIN) 500 mg in D5W 100 mL IVPB (MB+) (mg) 500 mg New Bag 08/03/24 0630    vancomycin (VANCOCIN) 500 mg in D5W 100 mL IVPB (MB+) (mg) 500 mg New Bag 08/02/24 0945    vancomycin (VANCOCIN) 500 mg in D5W 100 mL IVPB (MB+) (mg) 500 mg New Bag 07/31/24 0938                    Microbiologic Results:  Microbiology Results (last 7 days)       ** No results found for the last 168 hours. **

## 2024-08-04 NOTE — CONSULTS
"  [facility] - Telemetry  Adult Nutrition  Consult Note    SUMMARY     Recommendations    Recommendation:   1. Continue current diabetic diet as tolerated. With texture/consistency modifications per SLP/MD.  2. Addition of Kvng BID to promote wound healing.   3. Monitor weight/labs.    Goals:   Pt intake to remain >/= 85% EEN/EPN by RD follow up    Nutrition Goal Status: new  Communication of RD Recs: other (comment) (POC)    Assessment and Plan    ID  Osteomyelitis of right lower extremity  Nutrition Problem  Increased nutrient needs, energy/protein    Related to (etiology):   Physiological state    Signs and Symptoms (as evidenced by):   Pt with osteomyelitis of RLE.      Interventions:  Collaboration with other providers  Modified Beverage- Kvng BID    Nutrition Diagnosis Status:   New           Reason for Assessment    Reason For Assessment: consult (wound)  Diagnosis: other (see comments) (Acute metabolic encephalopathy)  Relevant Medical History: DMT2, HLD, osteomyelitis, HTN, left AKA  Interdisciplinary Rounds: did not attend  General Information Comments: Pt recently adimitted and discharged in the past month with Osteomyelitis of RLE. Currently receiving diabetic 2000 kcal diet with 100% intake recorded. PICC.. Tomas Score: 13 NFPE not completed r/t remote weekend coverage  Nutrition Discharge Planning: d/c TBD    Nutrition Risk Screen    Nutrition Risk Screen: large or nonhealing wound, burn or pressure injury    Nutrition/Diet History    Food Preferences: [place]  Food Allergies: NKFA  Factors Affecting Nutritional Intake: None identified at this time    Anthropometrics    Temp: 98.5 °F (36.9 °C)  Height Method: Stated  Height: 5' 10" (177.8 cm)  Height (inches): 70 in  Weight Method: Bed Scale  Weight: 80 kg (176 lb 5.9 oz)  Weight (lb): 176.37 lb  Ideal Body Weight (IBW), Male: 166 lb  % Ideal Body Weight, Male (lb): 106.25 %  BMI (Calculated): 25.3  BMI Grade: 25 - 29.9 - overweight  Amputation %: 16  Total " Amputation %: 16  Amputation Ideal Body Weight (IBW), Male (lb): 150 lb  Amputee BMI (kg/m2): 30.21 kg/m2       Lab/Procedures/Meds    Pertinent Labs Reviewed: reviewed  Pertinent Labs Comments: Cr 1.7, eGFR 42, Glu 117, Ca 8.2, Alb 1.6, Bili <0.1, ALT 9  Pertinent Medications Reviewed: reviewed  Pertinent Medications Comments: Amlodipine, gabapentin, insulin glargine, pantoprazole, piperacillin-tazobactam, Kcl, K Na Phos, senna docusate, Tamsulosin       Estimated/Assessed Needs    Weight Used For Calorie Calculations: 80 kg (176 lb 5.9 oz)  Energy Calorie Requirements (kcal): 2003  Energy Need Method: [place]-St Jeor (x1.3)  Protein Requirements: 80-96 gm (1.0-1.2 gm/kg)  Weight Used For Protein Calculations: 80 kg (176 lb 5.9 oz)     Estimated Fluid Requirement Method: RDA Method (or PER MD)  RDA Method (mL): 2003  CHO Requirement: 225 gm/day      Nutrition Prescription Ordered    Current Diet Order: Diabetic 2000 kcal    Evaluation of Received Nutrient/Fluid Intake    I/O: -1081  Energy Calories Required: meeting needs  Protein Required: meeting needs  Fluid Required: meeting needs  Comments: LBM 8/4  % Intake of Estimated Energy Needs: 75 - 100 %  % Meal Intake: 75 - 100 %    Nutrition Risk    Level of Risk/Frequency of Follow-up:  (2x/week)       Monitor and Evaluation    Food and Nutrient Intake: energy intake, food and beverage intake  Food and Nutrient Adminstration: diet order  Knowledge/Beliefs/Attitudes: food and nutrition knowledge/skill  Physical Activity and Function: nutrition-related ADLs and IADLs  Anthropometric Measurements: weight, weight change, body mass index  Biochemical Data, Medical Tests and Procedures: electrolyte and renal panel, gastrointestinal profile, glucose/endocrine profile, inflammatory profile, lipid profile       Nutrition Follow-Up    RD Follow-up?: Yes

## 2024-08-04 NOTE — HPI
74 y.o. male with diabetes, hypertension, hyperlipidemia, peripheral artery disease, left AKA, right heel osteomyelitis, pacemaker, chronic Sams with multiple infections presents with mental status changes for 1 day.  Patient known to our service from prior admissions.      06/26/2024 patient transferred from nursing home with worsening right heel wound consistent with osteomyelitis.  Patient was empirically started on vancomycin piperacillin tazobactam.  Was changed to vancomycin and cefepime for bone biopsy that was positive for MRSA and carbapenem resistant Pseudomonas aeruginosa.  Patient was discharged on 07/11/2024 07/25/2024 patient was transferred from nursing home because of some confusion and becoming nonverbal.  Upon evaluation, the patient was afebrile.  WBC 9.92.  Head CT without any acute abnormalities Was treated with vancomycin and cefepime.  Blood cultures and urine cultures in process.  Patient was noted to have sacral decubitus.  Patient continues to have expressive aphasia     Due to mental status change his cefepime was switched to zosyn

## 2024-08-04 NOTE — ASSESSMENT & PLAN NOTE
Nutrition Problem  Increased nutrient needs, energy/protein    Related to (etiology):   Physiological state    Signs and Symptoms (as evidenced by):   Pt with osteomyelitis of RLE.      Interventions:  Collaboration with other providers  Modified Beverage- Kvng BID    Nutrition Diagnosis Status:   New

## 2024-08-04 NOTE — PROGRESS NOTES
[facility] Progress Note    Admit Date: 8/2/2024   LOS: 2 days     ASSESSMENT/PLAN:     Mr. Castañeda is a 75 y/o male with PMHx of T2DM, HTN, BPH with chronic indwelling fung, pAfib, recent Right foot osteomyelitis, who is admitted for AMS (resolved) and YNES.      Non-oliguric YNES  - baseline Cr ~0.7-0.8, eGFR>60  - initially Cr 1.2 on admission, has since increased and remained stable at 1.8-1.9  - UA on admission with proteinuria and hematuria; also with leuk esterases, WBCs, and WBC clumps  - FENa on admission (7/25) 0.8% -> indicative of pre-renal etiology  - repeat FENa on 8/1 1.2% -> indeterminate, but not reliable in non-oliguric YNES  - Renal US w/o evidence of hydronephrosis, bladder decompressed around fung; increased bilateral renal cortical echogenicity  - currently, etiology of YNES is unclear; differentials are broad and include hypovolemia vs ATN from supratherapeutic vanc levels vs AIN from vanc or cefepime vs infectious GN from osteo  - Dose meds for decreased GFR  - Strict I/O, daily weights  - Please avoid iodinated contrast, gadolinium, fleets, phos-based laxatives, NSAIDs  - given broad differentials and persistent YNES, patient would benefit from renal biopsy to help determine etiology and guide management  - patient states that he will think about it for now, recommend consult to IR for renal biopsy as it would likely not be done until early next week anyway  - good UOP overnight with slight improvement in Cr  - if renal function continues to improve tomorrow, would be reasonable to hold off on renal biopsy  - if renal function does not improve or it worsens, would still recommend renal biopsy        We will continue to follow. Please call with any questions.      Harmony KNIGHT Do, MD  [facility] PGY-4    SUBJECTIVE:     Overnight Events:  Patient reports feeling fine today. States that his lunch was okay. Denies CP, SOB, nausea, vomiting, swelling. States that he is still considering the  renal biopsy.     Scheduled Meds:   amLODIPine  5 mg Oral Daily    apixaban  5 mg Oral BID    gabapentin  100 mg Oral BID    insulin glargine U-100 (Lantus)  6 Units Subcutaneous BID    mupirocin   Nasal BID    pantoprazole  40 mg Oral Daily    piperacillin-tazobactam (Zosyn) IV (PEDS and ADULTS) (extended infusion is not appropriate)  4.5 g Intravenous Q8H    potassium chloride  20 mEq Oral Before breakfast    potassium, sodium phosphates  2 packet Oral Before breakfast    senna-docusate 8.6-50 mg  1 tablet Oral BID    tamsulosin  0.4 mg Oral Daily    vancomycin (VANCOCIN) IV (PEDS and ADULTS)  500 mg Intravenous Once    white petrolatum   Topical (Top) Daily     Continuous Infusions:  PRN Meds:  Current Facility-Administered Medications:     acetaminophen, 650 mg, Oral, Q4H PRN    dextrose 10%, 12.5 g, Intravenous, PRN    dextrose 10%, 25 g, Intravenous, PRN    diphenhydrAMINE, 25 mg, Oral, Q6H PRN    glucagon (human recombinant), 1 mg, Intramuscular, PRN    glucose, 16 g, Oral, PRN    glucose, 24 g, Oral, PRN    insulin aspart U-100, 0-5 Units, Subcutaneous, QID (AC + HS) PRN    melatonin, 6 mg, Oral, Nightly PRN    naloxone, 0.02 mg, Intravenous, PRN    oxyCODONE, 5 mg, Oral, Q6H PRN    sodium chloride 0.9%, 10 mL, Intravenous, Q12H PRN    sodium chloride 0.9%, 10 mL, Intravenous, Q12H PRN    Pharmacy to dose Vancomycin consult, , , Once **AND** vancomycin - pharmacy to dose, , Intravenous, pharmacy to manage frequency    OBJECTIVE:     Vital Signs (Most Recent)  Temp: 98.3 °F (36.8 °C) (08/04/24 0725)  Pulse: 71 (08/04/24 0725)  Resp: 17 (08/04/24 0725)  BP: (!) 155/68 (08/04/24 0725)  SpO2: 98 % (08/04/24 0725)    Vital Signs Range (Last 24H):  Temp:  [97.6 °F (36.4 °C)-98.5 °F (36.9 °C)]   Pulse:  [42-77]   Resp:  [17-20]   BP: (132-174)/(60-71)   SpO2:  [96 %-100 %]     I & O (Last 24H):  Intake/Output Summary (Last 24 hours) at 8/4/2024 0842  Last data filed at 8/4/2024 0548  Gross per 24 hour   Intake --    Output 1081 ml   Net -1081 ml     Physical Examination:  General: alert and oriented, conversational, NAD  HEENT: PERRL, EOMI, moist mucus membranes   Cardiovascular: RRR  Pulm: CTAB, breathing comfortably on RA  Extremities: no edema, L AKA    Laboratory:  Reviewed.     Diagnostic Results:  Reviewed.

## 2024-08-04 NOTE — ASSESSMENT & PLAN NOTE
74 y.o. male with diabetes, hypertension, hyperlipidemia, peripheral artery disease, left AKA, right heel osteomyelitis, pacemaker, chronic Sams with multiple infections on whom ID is consulted for antibiotics side effects concerns.  Patient known to our service from prior admissions.        -would stop vancomycin   -would start dapto   -needs baseline and weekly CK while on dapto  -continue zosyn  -keep planned stop date of 8/12

## 2024-08-04 NOTE — CONSULTS
[facility] Consult Note    Reason for Consult:     YNES evaluation and management.    Assessment and Plan:     Mr. Castañeda is a 73 y/o male with PMHx of T2DM, HTN, BPH with chronic indwelling fung, pAfib, recent Right foot osteomyelitis, who is admitted for AMS (resolved) and YNES.     Non-oliguric YNES  - baseline Cr ~0.7-0.8, eGFR>60  - initially Cr 1.2 on admission, has since increased and remained stable at 1.8-1.9  - UA on admission with proteinuria and hematuria; also with leuk esterases, WBCs, and WBC clumps  - FENa on admission (7/25) 0.8% -> indicative of pre-renal etiology  - repeat FENa on 8/1 1.2% -> indeterminate, but not reliable in non-oliguric YNES  - Renal US w/o evidence of hydronephrosis, bladder decompressed around fung; increased bilateral renal cortical echogenicity  - currently, etiology of YNES is unclear; differentials are broad and include hypovolemia vs ATN from supratherapeutic vanc levels vs AIN from vanc or cefepime vs infectious GN from osteo  - Dose meds for decreased GFR  - Strict I/O, daily weights  - Please avoid iodinated contrast, gadolinium, fleets, phos-based laxatives, NSAIDs  - given broad differentials and persistent YNES, patient would benefit from renal biopsy to help determine etiology and guide management  - patient states that he will think about it for now, recommend consult to IR for renal biopsy as it would likely not be done until early next week anyway      We will continue to follow. Please call with any questions.     Harmony KNIGHT Do, MD  [facility] PGY-4    Subjective:      History of Present Illness:  Mr. Castañeda is a 73 y/o male with PMHx of T2DM, HTN, BPH with chronic indwelling fung, pAfib, recent Right foot osteomyelitis, who presented to the [facility] on 8/2/2024 with AMS. Patient had been recently found to have osteomyelitis of his right foot and was started on vanc and cefepime at his nursing home. On day of presentation,  patient was noted to have tremors and difficulty speaking, far from his baseline, so he was brought to ED. There was concern for stroke so CTH was done, which ruled out hemorrhagic stroke. Electrolyte abnormalities were corrected and cefepime was switched to zosyn due to possible cefepime neurotoxicity. Despite these changes, AMS persisted so patient was transferred to [facility] for MRI due to his PPM. MRI was negative for stroke and patient's mental status improved to baseline. He was then transferred back to [facility] after imaging done. Of note, patient was noted to have a mild YNES on presentation Cr 1.2 (baseline 0.7-0.8). Vanc trough collected early on in hospitalization elevated at 35. Cr remained stable around 1.1-1.3 between 7/25-7/30, but increased to 1.6, then to 1.8-1.9 for last few days. He was evaluated at [facility] by nephrology for YNES, which was deemed to be 2/2 vanc toxicity.     Past Medical History:  Past Medical History:   Diagnosis Date    Arthritis     legs    Bacteremia due to Gram-negative bacteria 3/23/2021    Diabetes mellitus     Diabetes mellitus, type 2     Hyperlipidemia     Hypertension     Osteomyelitis     Palliative care encounter 5/24/2023     Past Surgical History:  Past Surgical History:   Procedure Laterality Date    ABOVE-KNEE AMPUTATION Left 3/27/2024    Procedure: AMPUTATION, ABOVE KNEE;  Surgeon: Adal Arellano MD;  Location: [address];  Service: Vascular;  Laterality: Left;    ANGIOGRAPHY OF LOWER EXTREMITY N/A 2/3/2021    Procedure: Angiogram Extremity Bilateral;  Surgeon: Ernst Chacko MD;  Location: [address];  Service: Peripheral Vascular;  Laterality: N/A;  7.4 mintues fluoroscopy time  816.15 mGy  170.17 Gycm2    AORTOGRAPHY WITH EXTREMITY RUNOFF Bilateral 2/3/2021    Procedure: AORTOGRAM, WITH EXTREMITY RUNOFF;  Surgeon: Ernst Chacko MD;  Location: [address];  Service: Peripheral Vascular;  Laterality: Bilateral;    DEBRIDEMENT  OF FOOT Left 2/23/2021    Procedure: DEBRIDEMENT, LEFT HEEL;  Surgeon: Mayra Schroeder DPM;  Location: [facility] [address] [place] [address];  Service: Podiatry;  Laterality: Left;    ESOPHAGOGASTRODUODENOSCOPY N/A 6/28/2024    Procedure: EGD (ESOPHAGOGASTRODUODENOSCOPY);  Surgeon: Adal Chandra MD;  Location: [facility] ENDO;  Service: Gastroenterology;  Laterality: N/A;    FOOT AMPUTATION  October 2010    left high midfoot amputation    IMPLANTATION OF LEADLESS PACEMAKER N/A 10/12/2023    Procedure: [id number];  Surgeon: VANESSA Delatorre MD;  Location: [facility] EP LAB;  Service: Cardiology;  Laterality: N/A;  AVB, MICRA, EH, ANES, [place] [id number]     Allergies:  Review of patient's allergies indicates:  No Known Allergies    Medications:   In-Hospital Scheduled Medications:   amLODIPine  5 mg Oral Daily    apixaban  5 mg Oral BID    gabapentin  100 mg Oral BID    insulin glargine U-100 (Lantus)  6 Units Subcutaneous BID    mupirocin   Nasal BID    pantoprazole  40 mg Oral Daily    piperacillin-tazobactam (Zosyn) IV (PEDS and ADULTS) (extended infusion is not appropriate)  4.5 g Intravenous Q8H    potassium chloride  20 mEq Oral Before breakfast    potassium, sodium phosphates  2 packet Oral Before breakfast    senna-docusate 8.6-50 mg  1 tablet Oral BID    tamsulosin  0.4 mg Oral Daily    white petrolatum   Topical (Top) Daily      In-Hospital PRN Medications:    Current Facility-Administered Medications:     acetaminophen, 650 mg, Oral, Q4H PRN    dextrose 10%, 12.5 g, Intravenous, PRN    dextrose 10%, 25 g, Intravenous, PRN    diphenhydrAMINE, 25 mg, Oral, Q6H PRN    glucagon (human recombinant), 1 mg, Intramuscular, PRN    glucose, 16 g, Oral, PRN    glucose, 24 g, Oral, PRN    insulin aspart U-100, 0-5 Units, Subcutaneous, QID (AC + HS) PRN    melatonin, 6 mg, Oral, Nightly PRN    naloxone, 0.02 mg, Intravenous, PRN    oxyCODONE, 5 mg, Oral, Q6H PRN    sodium chloride 0.9%, 10 mL, Intravenous, Q12H PRN     sodium chloride 0.9%, 10 mL, Intravenous, Q12H PRN    Pharmacy to dose Vancomycin consult, , , Once **AND** vancomycin - pharmacy to dose, , Intravenous, pharmacy to manage frequency   In-Hospital IV Infusion Medications:     Home Medications:  Prior to Admission medications    Medication Sig Start Date End Date Taking? Authorizing Provider   acetaminophen (TYLENOL) 325 MG tablet Take 650 mg by mouth every 6 (six) hours as needed for Pain.   Yes Provider, Historical   apixaban (ELIQUIS) 5 mg Tab Take 1 tablet (5 mg total) by mouth 2 (two) times daily. 4/3/24  Yes Jori Byrd MD   ascorbic acid, vitamin C, (VITAMIN C) 500 MG tablet Take 500 mg by mouth 2 (two) times daily.   Yes Provider, Historical   D5W SolP 250 mL with vancomycin 1.25 gram SolR 1,250 mg Inject 1,250 mg into the vein Daily. 7/11/24  Yes Lo Melgar NP   ferrous sulfate 325 (65 FE) MG EC tablet Take 325 mg by mouth 2 (two) times daily.   Yes Provider, Historical   furosemide (LASIX) 20 MG tablet Take 1 tablet (20 mg total) by mouth once daily. 4/3/24 4/3/25 Yes Jori Byrd MD   gabapentin (NEURONTIN) 300 MG capsule Take 300 mg by mouth 2 (two) times daily. 11/3/23  Yes Provider, Historical   insulin aspart U-100 (NOVOLOG) 100 unit/mL (3 mL) InPn pen Inject 5 Units into the skin 3 (three) times daily with meals. 4/5/24 4/5/25 Yes Jori Byrd MD   insulin aspart U-100 (NOVOLOG) 100 unit/mL injection Inject 2-10 Units into the skin 3 (three) times daily before meals. 0-149=0  150-200: 2 units  201-250: 4 units  251-300: 6 units  301-350: 8 units  351-1000=10 units, NOTIFY MD   Yes Provider, Historical   LANTUS SOLOSTAR U-100 INSULIN 100 unit/mL (3 mL) InPn pen Inject 12 Units into the skin 2 (two) times a day. 6/21/24  Yes Provider, Historical   menthol-zinc oxide (CALMOSEPTINE) 0.44-20.6 % Oint Apply topically as needed.   Yes Provider, Historical   multivitamin (THERAGRAN) per tablet Take 1 tablet by mouth once daily.   Yes  "Provider, Historical   NIFEdipine (PROCARDIA-XL) 90 MG (OSM) 24 hr tablet Take 1 tablet (90 mg total) by mouth once daily. 4/4/24 4/4/25 Yes Jori Byrd MD   oxyCODONE (ROXICODONE) 5 MG immediate release tablet Take 1 tablet (5 mg total) by mouth every 6 (six) hours as needed for Pain. 5/29/24  Yes Nael Diallo MD   pantoprazole (PROTONIX) 40 MG tablet Take 40 mg by mouth once daily. Before breakfast 2/6/23  Yes Provider, Historical   potassium chloride SA (K-DUR,KLOR-CON) 20 MEQ tablet Take 20 mEq by mouth 2 (two) times daily. 7/22/24  Yes Provider, Historical   povidone-iodine (BETADINE) 10 % external solution Apply topically as needed for Wound Care.   Yes Provider, Historical   SANTYL ointment Apply topically once daily. unknown 6/7/24  Yes Provider, Historical   tamsulosin (FLOMAX) 0.4 mg Cap Take 0.4 mg by mouth once daily.   Yes Provider, Historical   zinc sulfate (ZINCATE) 50 mg zinc (220 mg) capsule Take 220 mg by mouth every morning.   Yes Provider, Historical   BD AUTOSHIELD DUO PEN NEEDLE 30 gauge x 3/16" Ndle  11/3/23   Provider, Historical   BD ULTRA-FINE ADITYA PEN NEEDLE 32 gauge x 5/32" Ndle USE FOUR TIMES DAILY WITH MEALS AND NIGHTLY 11/20/19   Miriam Duong NP   blood sugar diagnostic (CONTOUR TEST STRIPS) Strp 1 strip by Misc.(Non-Drug; Combo Route) route 4 (four) times daily. Use to check blood glucose 4x daily 4/5/24   Jori Byrd MD   ergocalciferol (ERGOCALCIFEROL) 50,000 unit Cap Take 1 capsule (50,000 Units total) by mouth Every Friday. for 10 doses 8/2/24 10/5/24  Dirk Condon MD   lancets (MICROLET LANCET) Misc 1 each by Misc.(Non-Drug; Combo Route) route 4 (four) times daily. Use to check blood sugars 4x daily 4/5/24   Jori Byrd MD   TRUE METRIX GLUCOSE METER Mis  2/14/24   Provider, Historical   hydroCHLOROthiazide (HYDRODIURIL) 25 MG tablet Take 0.5 tablets (12.5 mg total) by mouth every Mon, Wed, Fri. Beginning on 7/4/2022 7/4/22 7/8/22  Keerthi Mayorga MD " "    Family History:  Family History   Problem Relation Name Age of Onset    Diabetes Mother      Heart disease Mother      Stroke Sister      Cancer Brother      Diabetes Sister       Social History:  Social History     Tobacco Use    Smoking status: Never    Smokeless tobacco: Never   Substance Use Topics    Alcohol use: No     Comment: occassional    Drug use: No     Review of Systems:  As above in HPI. All other systems are reviewed and are negative.     Objective:   Last 24 Hour Vital Signs:  BP  Min: 132/61  Max: 161/66  Temp  Av °F (36.7 °C)  Min: 97.6 °F (36.4 °C)  Max: 98.5 °F (36.9 °C)  Pulse  Av.4  Min: 42  Max: 81  Resp  Av.9  Min: 18  Max: 20  SpO2  Av %  Min: 95 %  Max: 100 %  Height  Av' 10" (177.8 cm)  Min: 5' 10" (177.8 cm)  Max: 5' 10" (177.8 cm)  Weight  Av kg (176 lb 5.9 oz)  Min: 80 kg (176 lb 5.9 oz)  Max: 80 kg (176 lb 5.9 oz)  I/O last 3 completed shifts:  In: -   Out: 1600 [Urine:1600]    Physical Examination:  General: alert and oriented, conversational, NAD  HEENT: PERRL, EOMI, moist mucus membranes   Cardiovascular: RRR  Pulm: CTAB, breathing comfortably on RA  Extremities: no edema    Laboratory:  Most Recent Data:  CBC:   Lab Results   Component Value Date    WBC 16.84 (H) 2024    HGB 8.3 (L) 2024     2024    MCV 86 2024    RDW 18.1 (H) 2024     BMP:   Lab Results   Component Value Date     2024    K 4.3 2024     2024    CO2 22 (L) 2024     (H) 2024    BUN 22 2024    CREATININE 1.9 (H) 2024    CALCIUM 8.4 (L) 2024    MG 1.8 2024    PHOS 4.7 (H) 2024     LFTs:   Lab Results   Component Value Date    PROT 5.9 (L) 2024    ALBUMIN 1.7 (L) 2024    AST 14 2024    ALKPHOS 69 2024    ALT 9 (L) 2024     Anemia:   Lab Results   Component Value Date    IRON 46 2024    TIBC 107 (L) 2024    FERRITIN 697 (H) " 06/27/2024    AIORXGDR11 504 03/17/2024    FOLATE 4.2 02/19/2021     Urinalysis:   Lab Results   Component Value Date    LABURIN No growth 07/25/2024    COLORU Yellow 07/25/2024    CLARITYU Slightly Cloudy 04/22/2022    SPECGRAV 1.015 07/25/2024    NITRITE Negative 07/25/2024    KETONESU Negative 07/25/2024    UROBILINOGEN Negative 07/25/2024    WBCUA 44 (H) 07/25/2024     Trended Lab Data:  Recent Labs   Lab 07/28/24  0723 07/28/24  0724 07/29/24  0103 07/30/24  0455 08/01/24  0516 08/02/24  0522 08/03/24  0306   WBC  --  12.26 9.07  --  16.84*  --   --    PLT  --  345 298  --  316  --   --    MCV  --  86 85  --  86  --   --    RDW  --  18.2* 18.3*  --  18.1*  --   --      --  141   < > 141 141 141   K 3.5  --  3.3*   < > 5.0 4.6 4.3     --  110   < > 106 106 106   CO2 25  --  24   < > 27 28 22*   BUN 22  --  33*   < > 24* 24* 22   CREATININE 1.3  --  1.3   < > 1.8* 1.8* 1.9*     --  115*   < > 103 71 155*   CALCIUM 9.1  --  8.6*   < > 8.3* 8.1* 8.4*   PROT 6.9  --  5.9*  --   --   --   --    ALBUMIN 1.7*  --  1.5*   < > 1.6* 1.6* 1.7*   AST 14  --  14  --   --   --   --    ALKPHOS 82  --  69  --   --   --   --    ALT 10  --  9*  --   --   --   --     < > = values in this interval not displayed.     Other Relevant Results:  Radiology:  Imaging has been reviewed personally.

## 2024-08-04 NOTE — PROGRESS NOTES
[facility] Medicine Progress Note    Primary Team: [facility] Hospitalist Team B  Attending Physician: Vinod Elise MD  Resident: Alessio Mclean  Intern: Kayleen Chino    Subjective/Interval History      NAEON. Reports watery diarrhea. Denies blood in stool. Is AAOx4.      Objective   Last 24 Hour Vital Signs:  BP  Min: 132/61  Max: 174/70  Temp  Av.2 °F (36.8 °C)  Min: 97.6 °F (36.4 °C)  Max: 98.5 °F (36.9 °C)  Pulse  Av.3  Min: 42  Max: 77  Resp  Av.5  Min: 17  Max: 20  SpO2  Av.5 %  Min: 97 %  Max: 100 %  I/O last 3 completed shifts:  In: -   Out:  [Urine:; Stool:]    Physical Examination:  General: No acute distress. Appropriate behavior.   Cardiac: Bradycardic, Regular rhythm.  Pulmonary/Chest: CTAB. Normal work of breathing.   Abdomen: Soft, non tender, non distended, normoactive bowel sounds.   Extremities: LLE amputation, RLE no edema, wound on R heal  Neuro: Alert. Oriented to person, place, time. Moving all extremities spontaneously. Normal  strength bilaterally.     Laboratory and Microbiology:  I have independently reviewed data.    Imaging and EKGs:  I have independently reviewed data.    Assessment & Plan     Saji Castañeda is a 74 y.o. male with PMHx of T2DM, HTN, HLD, osteomyelitis of R foot admitted on 2024 for encephalopathy thought to be 2/2 cefepime induced neurotoxicity (now resolved) and workup and management of YNES.    Encephalopathy, resolved  - Initially presented with expressive aphasia and bilateral upper extremity tremors. CT and MRI negative for acute process  - Now significantly improved. AAOx4   - Thought to be 2/2 cefepime induced neurotoxicity.   - Transitioned from cefepime to zosyn.   - Will reach out to neurology to see if any follow up recommended    Osteomyelitis of RLE  - R heal OM diagnosed during admission - with bone cx positive for pseudomonas and MRSA. Discharged on vanc and cefepime for 6 week course.   - Transitioned form  cefepime to zosyn as above.  - Discontinue vanc and start dapto per ID. Will get first dose of dapto tomorrow morning.  - CK ordered, follow up results  - Continue wound care     YNES  - Baseline Cr 0.7-0.8  - Nephrology following, appreciate recs   - Currently, etiology of YNES is unclear; differentials are broad and include hypovolemia vs ATN from supratherapeutic vanc levels vs AIN from vanc or cefepime vs infectious GN from osteo   - Dose meds for decreased GFR  - Strict I/O, daily weights  - Avoid iodinated contrast, gadolinium, fleets, phos-based laxatives, NSAIDs   - Per nephrology, would benefit from renal biopsy given broad differential to help determine etiology and guide management.   - Consult to IR for renal biopsy. Holding apixaban.    T2DM  - Last A1c 6 on 7/25  - Home regimen Lantus 12u BID/Novolog 5u with meals  - Currently on Lantus 6u BID and low dose SSI  - POC glucose with meals and at bedtime  - BG goal 140-180 while inpatient  - Will continue to monitor and adjust as needed     pAfib  - Continue eliquis 5 mg BID     HTN  - Continue amlodipine 5 mg daily  - Can transition back to home med of nifedipine 90 mg     BPH  - Continue tamsulosin 0.4 mg     Anemia of chronic disease  - Trend CBC  - Transfuse if Hb<7      Code Status: Full  DVT Prophylaxis: Holding apixaban until after biopsy.   Diet: diabetic 2000 calorie  Disposition: Currently receiving IV abx for OM. Workup for YNES pending, possible renal biopsy with IR this week.     Case will be discussed with attending physician and attestation to follow, please appreciate.     Kayleen Chino MD  [facility] Medicine Resident, PGY-I  [facility] Hospitalist Team B    [facility] Medicine Hospitalist Pager numbers:   [facility] Hospitalist Medicine Team A (Lorne/Riki): [phone]  [facility] Hospitalist Medicine Team B (Arik/Lucio):  [phone]

## 2024-08-04 NOTE — PROGRESS NOTES
[facility]                   Pharmacy  Pharmacy Vancomycin/AG Sign-off    Therapy with vancomycin completed and/or consult discontinued by provider.  Pharmacy will sign off, please re-consult as needed. Thank you for allowing us to participate in this patient's care.     Carlyn Baumann, PharmD    [phone]          .nc

## 2024-08-04 NOTE — CONSULTS
[facility]  Infectious Disease  Consult Note    Patient Name: Saji Castañeda  MRN: [id number]  Admission Date: 8/2/2024  Hospital Length of Stay: 2 days  Attending Physician: Vinod Elise MD  Primary Care Provider: Vinod Elise MD     Isolation Status: Contact    Patient information was obtained from chart review.      Inpatient consult to Infectious Diseases  Consult performed by: Jaylan Arita MD  Consult ordered by: Alessio Mclean DO  Reason for consult: osteo        Assessment/Plan:     ID  Osteomyelitis of right lower extremity  74 y.o. male with diabetes, hypertension, hyperlipidemia, peripheral artery disease, left AKA, right heel osteomyelitis, pacemaker, chronic Sams with multiple infections on whom ID is consulted for antibiotics side effects concerns.  Patient known to our service from prior admissions.        -would stop vancomycin   -would start dapto   -needs baseline and weekly CK while on dapto  -continue zosyn  -keep planned stop date of 8/12           Thank you for your consult. I will sign off. Please contact us if you have any additional questions.    Jaylan Arita MD  Infectious Disease  [facility]    Subjective:     Principal Problem: Acute metabolic encephalopathy    HPI: 74 y.o. male with diabetes, hypertension, hyperlipidemia, peripheral artery disease, left AKA, right heel osteomyelitis, pacemaker, chronic Sams with multiple infections presents with mental status changes for 1 day.  Patient known to our service from prior admissions.      06/26/2024 patient transferred from nursing home with worsening right heel wound consistent with osteomyelitis.  Patient was empirically started on vancomycin piperacillin tazobactam.  Was changed to vancomycin and cefepime for bone biopsy that was positive for MRSA and carbapenem resistant Pseudomonas aeruginosa.  Patient was discharged on 07/11/2024 07/25/2024 patient was transferred from nursing home because of some  confusion and becoming nonverbal.  Upon evaluation, the patient was afebrile.  WBC 9.92.  Head CT without any acute abnormalities Was treated with vancomycin and cefepime.  Blood cultures and urine cultures in process.  Patient was noted to have sacral decubitus.  Patient continues to have expressive aphasia     Due to mental status change his cefepime was switched to zosyn         Past Medical History:   Diagnosis Date    Arthritis     legs    Bacteremia due to Gram-negative bacteria 3/23/2021    Diabetes mellitus     Diabetes mellitus, type 2     Hyperlipidemia     Hypertension     Osteomyelitis     Palliative care encounter 5/24/2023       Past Surgical History:   Procedure Laterality Date    ABOVE-KNEE AMPUTATION Left 3/27/2024    Procedure: AMPUTATION, ABOVE KNEE;  Surgeon: Adal Arellano MD;  Location: [facility] OR [address] [place] [address];  Service: Vascular;  Laterality: Left;    ANGIOGRAPHY OF LOWER EXTREMITY N/A 2/3/2021    Procedure: Angiogram Extremity Bilateral;  Surgeon: Ernst Chacko MD;  Location: [facility] OR [address] [place] [address];  Service: Peripheral Vascular;  Laterality: N/A;  7.4 mintues fluoroscopy time  816.15 mGy  170.17 Gycm2    AORTOGRAPHY WITH EXTREMITY RUNOFF Bilateral 2/3/2021    Procedure: AORTOGRAM, WITH EXTREMITY RUNOFF;  Surgeon: Ernst Chacko MD;  Location: [facility] OR [address] [place] [address];  Service: Peripheral Vascular;  Laterality: Bilateral;    DEBRIDEMENT OF FOOT Left 2/23/2021    Procedure: DEBRIDEMENT, LEFT HEEL;  Surgeon: Mayra Schroeder DPM;  Location: [facility] OR [address] [place] [address];  Service: Podiatry;  Laterality: Left;    ESOPHAGOGASTRODUODENOSCOPY N/A 6/28/2024    Procedure: EGD (ESOPHAGOGASTRODUODENOSCOPY);  Surgeon: Adal Chandra MD;  Location: [facility];  Service: Gastroenterology;  Laterality: N/A;    FOOT AMPUTATION  October 2010    left high midfoot amputation    IMPLANTATION OF LEADLESS PACEMAKER N/A 10/12/2023    Procedure: [id number];  Surgeon: VANESSA Delatorre MD;   Location: [facility] EP LAB;  Service: Cardiology;  Laterality: N/A;  AVB, MICRA, EH, [place]ES, [place] [address]       Review of patient's allergies indicates:  No Known Allergies    Medications:  Medications Prior to Admission   Medication Sig    acetaminophen (TYLENOL) 325 MG tablet Take 650 mg by mouth every 6 (six) hours as needed for Pain.    apixaban (ELIQUIS) 5 mg Tab Take 1 tablet (5 mg total) by mouth 2 (two) times daily.    ascorbic acid, vitamin C, (VITAMIN C) 500 MG tablet Take 500 mg by mouth 2 (two) times daily.    D5W SolP 250 mL with vancomycin 1.25 gram SolR 1,250 mg Inject 1,250 mg into the vein Daily.    ferrous sulfate 325 (65 FE) MG EC tablet Take 325 mg by mouth 2 (two) times daily.    furosemide (LASIX) 20 MG tablet Take 1 tablet (20 mg total) by mouth once daily.    gabapentin (NEURONTIN) 300 MG capsule Take 300 mg by mouth 2 (two) times daily.    insulin aspart U-100 (NOVOLOG) 100 unit/mL (3 mL) InPn pen Inject 5 Units into the skin 3 (three) times daily with meals.    insulin aspart U-100 (NOVOLOG) 100 unit/mL injection Inject 2-10 Units into the skin 3 (three) times daily before meals. 0-149=0  150-200: 2 units  201-250: 4 units  251-300: 6 units  301-350: 8 units  351-1000=10 units, NOTIFY MD NILDA KING U-100 INSULIN 100 unit/mL (3 mL) InPn pen Inject 12 Units into the skin 2 (two) times a day.    menthol-zinc oxide (CALMOSEPTINE) 0.44-20.6 % Oint Apply topically as needed.    multivitamin (THERAGRAN) per tablet Take 1 tablet by mouth once daily.    NIFEdipine (PROCARDIA-XL) 90 MG (OSM) 24 hr tablet Take 1 tablet (90 mg total) by mouth once daily.    oxyCODONE (ROXICODONE) 5 MG immediate release tablet Take 1 tablet (5 mg total) by mouth every 6 (six) hours as needed for Pain.    pantoprazole (PROTONIX) 40 MG tablet Take 40 mg by mouth once daily. Before breakfast    potassium chloride SA (K-DUR,KLOR-CON) 20 MEQ tablet Take 20 mEq by mouth 2 (two) times daily.    povidone-iodine (BETADINE) 10 %  "external solution Apply topically as needed for Wound Care.    SANTYL ointment Apply topically once daily. unknown    tamsulosin (FLOMAX) 0.4 mg Cap Take 0.4 mg by mouth once daily.    zinc sulfate (ZINCATE) 50 mg zinc (220 mg) capsule Take 220 mg by mouth every morning.    BD AUTOSHIELD DUO PEN NEEDLE 30 gauge x 3/16" Ndle     BD ULTRA-FINE ADITYA PEN NEEDLE 32 gauge x 5/32" Ndle USE FOUR TIMES DAILY WITH MEALS AND NIGHTLY    blood sugar diagnostic (CONTOUR TEST STRIPS) Strp 1 strip by Misc.(Non-Drug; Combo Route) route 4 (four) times daily. Use to check blood glucose 4x daily    ergocalciferol (ERGOCALCIFEROL) 50,000 unit Cap Take 1 capsule (50,000 Units total) by mouth Every Friday. for 10 doses    lancets (MICROLET LANCET) Misc 1 each by Misc.(Non-Drug; Combo Route) route 4 (four) times daily. Use to check blood sugars 4x daily    TRUE METRIX GLUCOSE METER Misc      Antibiotics (From admission, onward)      Start     Stop Route Frequency Ordered    08/03/24 0900  mupirocin 2 % ointment         08/08/24 0859 Nasl 2 times daily 08/02/24 2331    08/03/24 0145  piperacillin-tazobactam (ZOSYN) 4.5 g in D5W 100 mL IVPB (MB+)         -- IV Every 8 hours (non-standard times) 08/02/24 2329    08/02/24 2328  vancomycin - pharmacy to dose  (vancomycin IVPB (PEDS and ADULTS))        Placed in "And" Linked Group    -- IV pharmacy to manage frequency 08/02/24 2329          Antifungals (From admission, onward)      None          Antivirals (From admission, onward)      None             Immunization History   Administered Date(s) Administered    COVID-19, MRNA, LN-S, PF (MODERNA FULL 0.5 ML DOSE) 05/24/2021, 05/24/2021, 07/13/2021, 07/13/2021    Influenza (FLUAD) - Quadrivalent - Adjuvanted - PF *Preferred* (65+) 12/21/2023    Influenza - Quadrivalent - PF *Preferred* (6 months and older) 10/08/2020    Influenza - Trivalent (ADULT) 10/08/2020, 09/21/2021, 09/15/2022    PPD Test 12/09/2020, 03/02/2021, 06/21/2022, 09/19/2023, " 03/18/2024    Pneumococcal Conjugate - 13 Valent 10/16/2018, 10/16/2018       Family History       Problem Relation (Age of Onset)    Cancer Brother    Diabetes Mother, Sister    Heart disease Mother    Stroke Sister          Social History     Socioeconomic History    Marital status: Single   Tobacco Use    Smoking status: Never    Smokeless tobacco: Never   Substance and Sexual Activity    Alcohol use: No     Comment: occassional    Drug use: No    Sexual activity: Not Currently   Social History Narrative    Not currently working; lives with family     Social Determinants of Health     Financial Resource Strain: Low Risk  (7/30/2024)    Overall Financial Resource Strain (CARDIA)     Difficulty of Paying Living Expenses: Not very hard   Food Insecurity: Patient Unable To Answer (7/30/2024)    Hunger Vital Sign     Worried About Running Out of Food in the Last Year: Patient unable to answer     Ran Out of Food in the Last Year: Patient unable to answer   Transportation Needs: Patient Unable To Answer (7/30/2024)    TRANSPORTATION NEEDS     Transportation : Patient unable to answer   Physical Activity: Inactive (7/30/2024)    Exercise Vital Sign     Days of Exercise per Week: 0 days     Minutes of Exercise per Session: 0 min   Stress: Patient Unable To Answer (7/30/2024)    [employer] - Occupational Stress Questionnaire     Feeling of Stress : Patient unable to answer   Housing Stability: Patient Unable To Answer (7/30/2024)    Housing Stability Vital Sign     Unable to Pay for Housing in the Last Year: Patient unable to answer     Homeless in the Last Year: Patient unable to answer     Review of Systems   Unable to perform ROS: Other     Objective:     Vital Signs (Most Recent):  Temp: 98.6 °F (37 °C) (08/04/24 1217)  Pulse: 110 (08/04/24 1217)  Resp: 18 (08/04/24 1217)  BP: (!) 162/70 (08/04/24 1217)  SpO2: 98 % (08/04/24 1217) Vital Signs (24h Range):  Temp:  [97.6 °F (36.4 °C)-98.6  °F (37 °C)] 98.6 °F (37 °C)  Pulse:  [] 110  Resp:  [17-20] 18  SpO2:  [97 %-100 %] 98 %  BP: (136-174)/(60-71) 162/70     Weight: 80 kg (176 lb 5.9 oz)  Body mass index is 25.31 kg/m².    Estimated Creatinine Clearance: 38.8 mL/min (A) (based on SCr of 1.7 mg/dL (H)).     Physical Exam  Vitals and nursing note reviewed.   Constitutional:       General: He is not in acute distress.     Appearance: He is ill-appearing.   HENT:      Head: Atraumatic.      Mouth/Throat:      Mouth: Mucous membranes are moist.   Eyes:      Extraocular Movements: Extraocular movements intact.   Cardiovascular:      Rate and Rhythm: Normal rate and regular rhythm.      Heart sounds: No murmur heard.  Pulmonary:      Effort: Pulmonary effort is normal. No respiratory distress.      Breath sounds: Normal breath sounds. No wheezing.   Abdominal:      General: Abdomen is flat.      Palpations: Abdomen is soft.   Musculoskeletal:      Right lower leg: No edema.      Comments: L SHERMAN  R heel with bandage in place, soft boot    Skin:     General: Skin is warm and dry.      Coloration: Skin is not jaundiced.   Neurological:      Mental Status: He is alert and oriented to person, place, and time.          Significant Labs: All pertinent labs within the past 24 hours have been reviewed.    Significant Imaging: I have reviewed all pertinent imaging results/findings within the past 24 hours.

## 2024-08-05 DIAGNOSIS — N18.32 STAGE 3B CHRONIC KIDNEY DISEASE: Primary | ICD-10-CM

## 2024-08-05 PROBLEM — I48.91 A-FIB: Status: ACTIVE | Noted: 2024-08-05

## 2024-08-05 LAB
ALBUMIN SERPL BCP-MCNC: 1.6 G/DL (ref 3.5–5.2)
ALP SERPL-CCNC: 57 U/L (ref 55–135)
ALT SERPL W/O P-5'-P-CCNC: 8 U/L (ref 10–44)
ANION GAP SERPL CALC-SCNC: 9 MMOL/L (ref 8–16)
AST SERPL-CCNC: 12 U/L (ref 10–40)
BACTERIA #/AREA URNS HPF: ABNORMAL /HPF
BILIRUB SERPL-MCNC: <0.1 MG/DL (ref 0.1–1)
BILIRUB UR QL STRIP: NEGATIVE
BUN SERPL-MCNC: 14 MG/DL (ref 8–23)
CALCIUM SERPL-MCNC: 8.1 MG/DL (ref 8.7–10.5)
CHLORIDE SERPL-SCNC: 107 MMOL/L (ref 95–110)
CLARITY UR: ABNORMAL
CO2 SERPL-SCNC: 25 MMOL/L (ref 23–29)
COLOR UR: YELLOW
CREAT SERPL-MCNC: 1.7 MG/DL (ref 0.5–1.4)
ERYTHROCYTE [DISTWIDTH] IN BLOOD BY AUTOMATED COUNT: 17.9 % (ref 11.5–14.5)
EST. GFR  (NO RACE VARIABLE): 42 ML/MIN/1.73 M^2
GLUCOSE SERPL-MCNC: 94 MG/DL (ref 70–110)
GLUCOSE UR QL STRIP: NEGATIVE
HCT VFR BLD AUTO: 27.3 % (ref 40–54)
HGB BLD-MCNC: 8.3 G/DL (ref 14–18)
HGB UR QL STRIP: ABNORMAL
HYALINE CASTS #/AREA URNS LPF: 0 /LPF
KETONES UR QL STRIP: NEGATIVE
LEUKOCYTE ESTERASE UR QL STRIP: ABNORMAL
MAGNESIUM SERPL-MCNC: 1.6 MG/DL (ref 1.6–2.6)
MCH RBC QN AUTO: 26.1 PG (ref 27–31)
MCHC RBC AUTO-ENTMCNC: 30.4 G/DL (ref 32–36)
MCV RBC AUTO: 86 FL (ref 82–98)
MICROSCOPIC COMMENT: ABNORMAL
NITRITE UR QL STRIP: NEGATIVE
OHS QRS DURATION: 82 MS
OHS QTC CALCULATION: 428 MS
PH UR STRIP: 6 [PH] (ref 5–8)
PHOSPHATE SERPL-MCNC: 3.2 MG/DL (ref 2.7–4.5)
PLATELET # BLD AUTO: 349 K/UL (ref 150–450)
PMV BLD AUTO: 9.2 FL (ref 9.2–12.9)
POCT GLUCOSE: 148 MG/DL (ref 70–110)
POCT GLUCOSE: 178 MG/DL (ref 70–110)
POCT GLUCOSE: 99 MG/DL (ref 70–110)
POTASSIUM SERPL-SCNC: 3.8 MMOL/L (ref 3.5–5.1)
PROT SERPL-MCNC: 6.2 G/DL (ref 6–8.4)
PROT UR QL STRIP: ABNORMAL
RBC # BLD AUTO: 3.18 M/UL (ref 4.6–6.2)
RBC #/AREA URNS HPF: 25 /HPF (ref 0–4)
SODIUM SERPL-SCNC: 141 MMOL/L (ref 136–145)
SP GR UR STRIP: 1.01 (ref 1–1.03)
URN SPEC COLLECT METH UR: ABNORMAL
UROBILINOGEN UR STRIP-ACNC: NEGATIVE EU/DL
WBC # BLD AUTO: 9.56 K/UL (ref 3.9–12.7)
WBC #/AREA URNS HPF: >100 /HPF (ref 0–5)
WBC CLUMPS URNS QL MICRO: ABNORMAL
YEAST URNS QL MICRO: ABNORMAL

## 2024-08-05 PROCEDURE — 25000003 PHARM REV CODE 250

## 2024-08-05 PROCEDURE — 93005 ELECTROCARDIOGRAM TRACING: CPT

## 2024-08-05 PROCEDURE — 84100 ASSAY OF PHOSPHORUS: CPT

## 2024-08-05 PROCEDURE — 11000001 HC ACUTE MED/SURG PRIVATE ROOM

## 2024-08-05 PROCEDURE — 63600175 PHARM REV CODE 636 W HCPCS

## 2024-08-05 PROCEDURE — 85027 COMPLETE CBC AUTOMATED: CPT

## 2024-08-05 PROCEDURE — 99222 1ST HOSP IP/OBS MODERATE 55: CPT | Mod: NSCH,GC,, | Performed by: PHYSICIAN ASSISTANT

## 2024-08-05 PROCEDURE — 80053 COMPREHEN METABOLIC PANEL: CPT

## 2024-08-05 PROCEDURE — 83735 ASSAY OF MAGNESIUM: CPT

## 2024-08-05 PROCEDURE — 93010 ELECTROCARDIOGRAM REPORT: CPT | Mod: ,,, | Performed by: INTERNAL MEDICINE

## 2024-08-05 PROCEDURE — 36415 COLL VENOUS BLD VENIPUNCTURE: CPT

## 2024-08-05 PROCEDURE — 81000 URINALYSIS NONAUTO W/SCOPE: CPT

## 2024-08-05 PROCEDURE — 27000207 HC ISOLATION

## 2024-08-05 RX ORDER — MAGNESIUM SULFATE HEPTAHYDRATE 40 MG/ML
2 INJECTION, SOLUTION INTRAVENOUS ONCE
Status: COMPLETED | OUTPATIENT
Start: 2024-08-05 | End: 2024-08-05

## 2024-08-05 RX ADMIN — MUPIROCIN: 20 OINTMENT TOPICAL at 08:08

## 2024-08-05 RX ADMIN — DAPTOMYCIN 480 MG: 350 INJECTION, POWDER, LYOPHILIZED, FOR SOLUTION INTRAVENOUS at 09:08

## 2024-08-05 RX ADMIN — GABAPENTIN 100 MG: 100 CAPSULE ORAL at 09:08

## 2024-08-05 RX ADMIN — AMLODIPINE BESYLATE 5 MG: 5 TABLET ORAL at 09:08

## 2024-08-05 RX ADMIN — PIPERACILLIN SODIUM AND TAZOBACTAM SODIUM 4.5 G: 4; .5 INJECTION, POWDER, LYOPHILIZED, FOR SOLUTION INTRAVENOUS at 10:08

## 2024-08-05 RX ADMIN — PIPERACILLIN SODIUM AND TAZOBACTAM SODIUM 4.5 G: 4; .5 INJECTION, POWDER, LYOPHILIZED, FOR SOLUTION INTRAVENOUS at 04:08

## 2024-08-05 RX ADMIN — APIXABAN 5 MG: 5 TABLET, FILM COATED ORAL at 08:08

## 2024-08-05 RX ADMIN — MUPIROCIN: 20 OINTMENT TOPICAL at 09:08

## 2024-08-05 RX ADMIN — INSULIN GLARGINE 6 UNITS: 100 INJECTION, SOLUTION SUBCUTANEOUS at 09:08

## 2024-08-05 RX ADMIN — PANTOPRAZOLE SODIUM 40 MG: 40 TABLET, DELAYED RELEASE ORAL at 09:08

## 2024-08-05 RX ADMIN — MAGNESIUM SULFATE HEPTAHYDRATE 2 G: 40 INJECTION, SOLUTION INTRAVENOUS at 09:08

## 2024-08-05 RX ADMIN — INSULIN GLARGINE 6 UNITS: 100 INJECTION, SOLUTION SUBCUTANEOUS at 08:08

## 2024-08-05 RX ADMIN — OXYCODONE HYDROCHLORIDE 5 MG: 5 TABLET ORAL at 08:08

## 2024-08-05 RX ADMIN — TAMSULOSIN HYDROCHLORIDE 0.4 MG: 0.4 CAPSULE ORAL at 09:08

## 2024-08-05 RX ADMIN — GABAPENTIN 100 MG: 100 CAPSULE ORAL at 08:08

## 2024-08-05 RX ADMIN — WHITE PETROLATUM: 1.75 OINTMENT TOPICAL at 04:08

## 2024-08-05 RX ADMIN — PIPERACILLIN SODIUM AND TAZOBACTAM SODIUM 4.5 G: 4; .5 INJECTION, POWDER, LYOPHILIZED, FOR SOLUTION INTRAVENOUS at 02:08

## 2024-08-05 NOTE — CONSULTS
Interventional Radiology  Consult/History & Physical Note      Reason for Consult: kidney biopsy    SUBJECTIVE:     Chief Complaint:  YNES    History of Present Illness:  Saji Castañeda is a 74 y.o. 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  Interventional Radiology has been consulted for image guided targeted kidney biopsy. Pt has had recent imaging including a  US retroperitoneal  on 7/31.  Pt is afebrile and hemodynamically stable, although systolic BP on higher end for biopsy. Last dose of eliquis 8/4 at 0920    Review of Systems   Constitutional:  Negative for chills, fever, malaise/fatigue and weight loss.   Respiratory:  Negative for cough and shortness of breath.    Cardiovascular:  Negative for chest pain, palpitations and leg swelling.   Gastrointestinal:  Negative for abdominal pain, diarrhea, nausea and vomiting.   Genitourinary:  Negative for dysuria and flank pain.   Musculoskeletal:  Negative for back pain and myalgias.   Neurological:  Negative for weakness and headaches.       Scheduled Meds:   amLODIPine  5 mg Oral Daily    DAPTOmycin (CUBICIN) IV (PEDS and ADULTS)  6 mg/kg Intravenous Q24H    gabapentin  100 mg Oral BID    insulin glargine U-100 (Lantus)  6 Units Subcutaneous BID    magnesium sulfate IVPB  2 g Intravenous Once    mupirocin   Nasal BID    pantoprazole  40 mg Oral Daily    piperacillin-tazobactam (Zosyn) IV (PEDS and ADULTS) (extended infusion is not appropriate)  4.5 g Intravenous Q8H    senna-docusate 8.6-50 mg  1 tablet Oral BID    tamsulosin  0.4 mg Oral Daily    white petrolatum   Topical (Top) Daily     Continuous Infusions:  PRN Meds:  Current Facility-Administered Medications:     acetaminophen, 650 mg, Oral, Q4H PRN    dextrose 10%, 12.5 g, Intravenous, PRN    dextrose 10%, 25 g, Intravenous, PRN    diphenhydrAMINE, 25  mg, Oral, Q6H PRN    glucagon (human recombinant), 1 mg, Intramuscular, PRN    glucose, 16 g, Oral, PRN    glucose, 24 g, Oral, PRN    insulin aspart U-100, 0-5 Units, Subcutaneous, QID (AC + HS) PRN    melatonin, 6 mg, Oral, Nightly PRN    naloxone, 0.02 mg, Intravenous, PRN    oxyCODONE, 5 mg, Oral, Q6H PRN    sodium chloride 0.9%, 10 mL, Intravenous, Q12H PRN    sodium chloride 0.9%, 10 mL, Intravenous, Q12H PRN    Review of patient's allergies indicates:  No Known Allergies    Past Medical History:   Diagnosis Date    Arthritis     legs    Bacteremia due to Gram-negative bacteria 3/23/2021    Diabetes mellitus     Diabetes mellitus, type 2     Hyperlipidemia     Hypertension     Osteomyelitis     Palliative care encounter 5/24/2023     Past Surgical History:   Procedure Laterality Date    ABOVE-KNEE AMPUTATION Left 3/27/2024    Procedure: AMPUTATION, ABOVE KNEE;  Surgeon: Adal Arellano MD;  Location: [facility] OR [address] [place] [address];  Service: Vascular;  Laterality: Left;    ANGIOGRAPHY OF LOWER EXTREMITY N/A 2/3/2021    Procedure: Angiogram Extremity Bilateral;  Surgeon: Ernst Chacko MD;  Location: [facility] OR [address] [place] [address];  Service: Peripheral Vascular;  Laterality: N/A;  7.4 mintues fluoroscopy time  816.15 mGy  170.17 Gycm2    AORTOGRAPHY WITH EXTREMITY RUNOFF Bilateral 2/3/2021    Procedure: AORTOGRAM, WITH EXTREMITY RUNOFF;  Surgeon: Ernst Chacko MD;  Location: [facility] OR [address] [place] [address];  Service: Peripheral Vascular;  Laterality: Bilateral;    DEBRIDEMENT OF FOOT Left 2/23/2021    Procedure: DEBRIDEMENT, LEFT HEEL;  Surgeon: Mayra Schroeder DPM;  Location: [facility] OR [address] [place] [address];  Service: Podiatry;  Laterality: Left;    ESOPHAGOGASTRODUODENOSCOPY N/A 6/28/2024    Procedure: EGD (ESOPHAGOGASTRODUODENOSCOPY);  Surgeon: Adal Chandra MD;  Location: [facility];  Service: Gastroenterology;  Laterality: N/A;    FOOT AMPUTATION  October 2010    left high midfoot amputation    IMPLANTATION OF LEADLESS  PACEMAKER N/A 10/12/2023    Procedure: [id number];  Surgeon: VANESSA Delatorre MD;  Location: [facility] LAB;  Service: Cardiology;  Laterality: N/A;  AVB, MICRA, EH, [place], [place] [address]     Family History   Problem Relation Name Age of Onset    Diabetes Mother      Heart disease Mother      Stroke Sister      Cancer Brother      Diabetes Sister       Social History     Tobacco Use    Smoking status: Never    Smokeless tobacco: Never   Substance Use Topics    Alcohol use: No     Comment: occassional    Drug use: No       OBJECTIVE:     Vital Signs (Most Recent)  Temp: 98 °F (36.7 °C) (08/05/24 1044)  Pulse: 60 (08/05/24 1044)  Resp: 18 (08/05/24 1044)  BP: (!) 160/72 (08/05/24 1044)  SpO2: 96 % (08/05/24 1044)    Physical Exam:  Physical Exam  Vitals and nursing note reviewed.   Constitutional:       Appearance: Normal appearance.   HENT:      Head: Normocephalic and atraumatic.   Eyes:      General: No scleral icterus.     Extraocular Movements: Extraocular movements intact.   Cardiovascular:      Rate and Rhythm: Normal rate.   Pulmonary:      Effort: Pulmonary effort is normal.   Musculoskeletal:         General: Normal range of motion.      Cervical back: Normal range of motion.      Comments: R foot in boot  L AKA   Skin:     General: Skin is warm and dry.   Neurological:      Mental Status: He is alert and oriented to person, place, and time.   Psychiatric:         Mood and Affect: Mood normal.         Behavior: Behavior normal.         Thought Content: Thought content normal.         Judgment: Judgment normal.         Laboratory  I have reviewed all pertinent lab results within the past 24 hours.  CBC:   Recent Labs   Lab 08/05/24 0344   WBC 9.56   RBC 3.18*   HGB 8.3*   HCT 27.3*      MCV 86   MCH 26.1*   MCHC 30.4*     CMP:   Recent Labs   Lab 08/05/24 0344   GLU 94   CALCIUM 8.1*   ALBUMIN 1.6*   PROT 6.2      K 3.8   CO2 25      BUN 14   CREATININE 1.7*   ALKPHOS 57   ALT  "8*   AST 12   BILITOT <0.1*     Coagulation: No results for input(s): "LABPROT", "INR", "APTT" in the last 168 hours.    ASA/Mallampati  ASA: 3  Mallampati: 2    Imaging:  Recent imaging studies including  retroperitoneal US  on 7/31 which was independently reviewed by Nataly Wiggins PA-C and David Guillen MD.     ASSESSMENT/PLAN:     Assessment:  74 y.o. 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     The procedure was discussed in great detail with the patient including thorough explanations of the potential risks and benefits of US guided non targeted kidney biopsy. Risks include but are not limited to sepsis, severe infection, hemorrhage, damage to surrounding structures and need for additional procedures.     Case discussed with nephrology fellow Dr. Villegas noting improvement in urine output, recommend holding off on biopsy at this time.    Plan:  Will NOT proceed with image guided non targeted kidney biopsy at this time after discussion with nephrology.  Please reconsult IR if kidney function worsens again and biopsy needs to be arranged.  IR can arrange outpatient with moderate sedation if needed.  Anticoagulation history reviewed. If kidney biopsy desired in the future, eliquis would need to be held for 6 doses/3 days based on GFR and SIRS guidelines.  Coagulation labs reviewed. INR 1.3 and plt count 349.  Thank you for the consult. Please contact with questions via [employer] secure chat      Nataly Wiggins PA-C  Interventional Radiology    "

## 2024-08-05 NOTE — PLAN OF CARE
CM met with pt at bedside to discuss discharge planning. Pt is a resident of [facility].   Pt is dependent with ADL's. Uses a WC to transport. Upon discharge, hospital will provide transport back to [facility]. Pt made aware to contact CM with any questions or concerns. CM will continue to follow and assist with any discharge needs.   [facility] - Telemetry  Initial Discharge Assessment       Primary Care Provider: Vinod Elise MD    Admission Diagnosis: Metabolic encephalopathy [G93.41]    Admission Date: 8/2/2024  Expected Discharge Date:     Transition of Care Barriers: (P) None    Payor: [employer] MEDICARE / Plan: [employer] H[employer] PPO SPECIAL NEEDS / Product Type: Medicare Advantage /     Extended Emergency Contact Information  Primary Emergency Contact: Kandy Castañeda   [place]  Mobile Phone: [phone]  Relation: Sister  Secondary Emergency Contact: January Nicholson   [place]  Mobile Phone: [phone]  Relation: Sister    Discharge Plan A: (P) Return to nursing home, Skilled Nursing Facility         [id number] [place] - [address] [place] [address] AT [facility] [place] [facility]  [address] [place] [id number]  Phone: [phone] Fax: [phone]    [facility] [address] [address]  [address] [address]  Phone: [phone] Fax: [phone]      Initial Assessment (most recent)       Adult Discharge Assessment - 08/05/24 1540          Discharge Assessment    Assessment Type Discharge Planning Assessment     Confirmed/corrected address, phone number and insurance Yes     Confirmed Demographics Correct on Facesheet     Source of Information health record;patient     Communicated OXANA with patient/caregiver Date not available/Unable to determine (P)      People in Home facility resident (P)      Do you expect to return to your current living situation? Yes (P)      Prior to hospitilization  cognitive status: Unable to Assess (P)      Current cognitive status: Alert/Oriented (P)      Walking or Climbing Stairs Difficulty yes (P)      Walking or Climbing Stairs transferring difficulty, assistance 1 person;transferring difficulty, requires equipment (P)      Mobility Management WC (P)      Dressing/Bathing Difficulty yes (P)      Dressing/Bathing bathing difficulty, assistance 1 person (P)      Equipment Currently Used at Home wheelchair (P)      Readmission within 30 days? Yes (P)      Patient currently being followed by outpatient case management? No (P)      Do you have prescription coverage? Yes (P)      Coverage [employer] /Medicaid (P)      Do you have any problems affording any of your prescribed medications? No (P)      How do you get to doctors appointments? -- (P)    facility  transport    Discharge Plan A Return to nursing home;Skilled Nursing Facility (P)      DME Needed Upon Discharge  none (P)      Discharge Plan discussed with: Patient (P)      Transition of Care Barriers None (P)

## 2024-08-05 NOTE — MEDICAL/APP STUDENT
[facility] Nephrology Consult Note    Reason for Consult:     YNES evaluation and management.    Assessment and Plan:     Mr. Castañeda is a 75 y/o male with PMHx of T2DM, HTN, BPH with chronic indwelling fung, pAfib, recent Right foot osteomyelitis, who is admitted for AMS (resolved) and YNES.      Non-oliguric YNES  - baseline Cr ~0.7-0.8, eGFR>60  - initially Cr 1.2 on admission, has since increased and remained stable at 1.8-1.9  - UA on admission with proteinuria and hematuria; also with leuk esterases, WBCs, and WBC clumps  - FENa on admission (7/25) 0.8% -> indicative of pre-renal etiology  - repeat FENa on 8/1 1.2% -> indeterminate, but not reliable in non-oliguric YNES  - Renal US w/o evidence of hydronephrosis, bladder decompressed around fung; increased bilateral renal cortical echogenicity  - currently, etiology of YNES is unclear; differentials are broad and include hypovolemia vs ATN from supratherapeutic vanc levels vs AIN from vanc or cefepime vs infectious GN from osteo  - Dose meds for decreased GFR  - Strict I/O, daily weights  - Please avoid iodinated contrast, gadolinium, fleets, phos-based laxatives, NSAIDs  - given broad differentials and persistent YNES, patient would benefit from renal biopsy to help determine etiology and guide management. As kidney function is stable, biopsy deferred at this time.  - good UOP overnight. Cr stable since yesterday  - as Cr is stable, patient will not need renal biopsy at this time. Follow up with outpatient nephrologist.      Subjective:      Interval history:   NAEON. Patient is resting comfortably in bed. Denies SOB, swelling, CP. Patient reports continued urine output. Patient amenable to renal biopsy if necessary. Given stable kidney function, informed patient biopsy is not needed at this time.      Past Medical History:  Past Medical History:   Diagnosis Date    Arthritis     legs    Bacteremia due to Gram-negative bacteria 3/23/2021    Diabetes mellitus      Diabetes mellitus, type 2     Hyperlipidemia     Hypertension     Osteomyelitis     Palliative care encounter 5/24/2023       Past Surgical History:  Past Surgical History:   Procedure Laterality Date    ABOVE-KNEE AMPUTATION Left 3/27/2024    Procedure: AMPUTATION, ABOVE KNEE;  Surgeon: Adal Arellano MD;  Location: [facility] OR [address] [place] [address];  Service: Vascular;  Laterality: Left;    ANGIOGRAPHY OF LOWER EXTREMITY N/A 2/3/2021    Procedure: Angiogram Extremity Bilateral;  Surgeon: Ernst Chacko MD;  Location: [address];  Service: Peripheral Vascular;  Laterality: N/A;  7.4 mintues fluoroscopy time  816.15 mGy  170.17 Gycm2    AORTOGRAPHY WITH EXTREMITY RUNOFF Bilateral 2/3/2021    Procedure: AORTOGRAM, WITH EXTREMITY RUNOFF;  Surgeon: Ernst Chacko MD;  Location: [address];  Service: Peripheral Vascular;  Laterality: Bilateral;    DEBRIDEMENT OF FOOT Left 2/23/2021    Procedure: DEBRIDEMENT, LEFT HEEL;  Surgeon: Mayra Schroeder DPM;  Location: [address];  Service: Podiatry;  Laterality: Left;    ESOPHAGOGASTRODUODENOSCOPY N/A 6/28/2024    Procedure: EGD (ESOPHAGOGASTRODUODENOSCOPY);  Surgeon: Adal Chandra MD;  Location: [facility];  Service: Gastroenterology;  Laterality: N/A;    FOOT AMPUTATION  October 2010    left high midfoot amputation    IMPLANTATION OF LEADLESS PACEMAKER N/A 10/12/2023    Procedure: [id number];  Surgeon: VANESSA Delatorre MD;  Location: [facility] EP LAB;  Service: Cardiology;  Laterality: N/A;  AVB, MICRA, EH, ANES, [place] [id number]       Allergies:  Review of patient's allergies indicates:  No Known Allergies    Medications:   In-Hospital Scheduled Medications:   amLODIPine  5 mg Oral Daily    DAPTOmycin (CUBICIN) IV (PEDS and ADULTS)  6 mg/kg Intravenous Q24H    gabapentin  100 mg Oral BID    insulin glargine U-100 (Lantus)  6 Units Subcutaneous BID    magnesium sulfate IVPB  2 g Intravenous Once    mupirocin   Nasal BID     pantoprazole  40 mg Oral Daily    piperacillin-tazobactam (Zosyn) IV (PEDS and ADULTS) (extended infusion is not appropriate)  4.5 g Intravenous Q8H    senna-docusate 8.6-50 mg  1 tablet Oral BID    tamsulosin  0.4 mg Oral Daily    white petrolatum   Topical (Top) Daily      In-Hospital PRN Medications:    Current Facility-Administered Medications:     acetaminophen, 650 mg, Oral, Q4H PRN    dextrose 10%, 12.5 g, Intravenous, PRN    dextrose 10%, 25 g, Intravenous, PRN    diphenhydrAMINE, 25 mg, Oral, Q6H PRN    glucagon (human recombinant), 1 mg, Intramuscular, PRN    glucose, 16 g, Oral, PRN    glucose, 24 g, Oral, PRN    insulin aspart U-100, 0-5 Units, Subcutaneous, QID (AC + HS) PRN    melatonin, 6 mg, Oral, Nightly PRN    naloxone, 0.02 mg, Intravenous, PRN    oxyCODONE, 5 mg, Oral, Q6H PRN    sodium chloride 0.9%, 10 mL, Intravenous, Q12H PRN    sodium chloride 0.9%, 10 mL, Intravenous, Q12H PRN   In-Hospital IV Infusion Medications:     Home Medications:  Prior to Admission medications    Medication Sig Start Date End Date Taking? Authorizing Provider   acetaminophen (TYLENOL) 325 MG tablet Take 650 mg by mouth every 6 (six) hours as needed for Pain.   Yes Provider, Historical   apixaban (ELIQUIS) 5 mg Tab Take 1 tablet (5 mg total) by mouth 2 (two) times daily. 4/3/24  Yes Jori Byrd MD   ascorbic acid, vitamin C, (VITAMIN C) 500 MG tablet Take 500 mg by mouth 2 (two) times daily.   Yes Provider, Historical   D5W SolP 250 mL with vancomycin 1.25 gram SolR 1,250 mg Inject 1,250 mg into the vein Daily. 7/11/24  Yes Lo Melgar, SHAYNE   ferrous sulfate 325 (65 FE) MG EC tablet Take 325 mg by mouth 2 (two) times daily.   Yes Provider, Historical   furosemide (LASIX) 20 MG tablet Take 1 tablet (20 mg total) by mouth once daily. 4/3/24 4/3/25 Yes Jori Byrd MD   gabapentin (NEURONTIN) 300 MG capsule Take 300 mg by mouth 2 (two) times daily. 11/3/23  Yes Provider, Historical   insulin aspart  "U-100 (NOVOLOG) 100 unit/mL (3 mL) InPn pen Inject 5 Units into the skin 3 (three) times daily with meals. 4/5/24 4/5/25 Yes Jori Byrd MD   insulin aspart U-100 (NOVOLOG) 100 unit/mL injection Inject 2-10 Units into the skin 3 (three) times daily before meals. 0-149=0  150-200: 2 units  201-250: 4 units  251-300: 6 units  301-350: 8 units  351-1000=10 units, NOTIFY MD   Yes Provider, Historical   LANTUS SOLOSTAR U-100 INSULIN 100 unit/mL (3 mL) InPn pen Inject 12 Units into the skin 2 (two) times a day. 6/21/24  Yes Provider, Historical   menthol-zinc oxide (CALMOSEPTINE) 0.44-20.6 % Oint Apply topically as needed.   Yes Provider, Historical   multivitamin (THERAGRAN) per tablet Take 1 tablet by mouth once daily.   Yes Provider, Historical   NIFEdipine (PROCARDIA-XL) 90 MG (OSM) 24 hr tablet Take 1 tablet (90 mg total) by mouth once daily. 4/4/24 4/4/25 Yes Jori Byrd MD   oxyCODONE (ROXICODONE) 5 MG immediate release tablet Take 1 tablet (5 mg total) by mouth every 6 (six) hours as needed for Pain. 5/29/24  Yes Nael Diallo MD   pantoprazole (PROTONIX) 40 MG tablet Take 40 mg by mouth once daily. Before breakfast 2/6/23  Yes Provider, Historical   potassium chloride SA (K-DUR,KLOR-CON) 20 MEQ tablet Take 20 mEq by mouth 2 (two) times daily. 7/22/24  Yes Provider, Historical   povidone-iodine (BETADINE) 10 % external solution Apply topically as needed for Wound Care.   Yes Provider, Historical   SANTYL ointment Apply topically once daily. unknown 6/7/24  Yes Provider, Historical   tamsulosin (FLOMAX) 0.4 mg Cap Take 0.4 mg by mouth once daily.   Yes Provider, Historical   zinc sulfate (ZINCATE) 50 mg zinc (220 mg) capsule Take 220 mg by mouth every morning.   Yes Provider, Historical   BD AUTOSHIELD DUO PEN NEEDLE 30 gauge x 3/16" Ndle  11/3/23   Provider, Historical   BD ULTRA-FINE ADITYA PEN NEEDLE 32 gauge x 5/32" Ndle USE FOUR TIMES DAILY WITH MEALS AND NIGHTLY 11/20/19   Miriam Duong, NP   blood " "sugar diagnostic (CONTOUR TEST STRIPS) Strp 1 strip by Misc.(Non-Drug; Combo Route) route 4 (four) times daily. Use to check blood glucose 4x daily 24   Jori Byrd MD   ergocalciferol (ERGOCALCIFEROL) 50,000 unit Cap Take 1 capsule (50,000 Units total) by mouth Every Friday. for 10 doses 8/2/24 10/5/24  Dirk Condon MD   lancets (MICROLET LANCET) Misc 1 each by Misc.(Non-Drug; Combo Route) route 4 (four) times daily. Use to check blood sugars 4x daily 24   Jori Byrd MD   TRUE METRIX GLUCOSE METER [facility]  24   Shakeel Sim   hydroCHLOROthiazide (HYDRODIURIL) 25 MG tablet Take 0.5 tablets (12.5 mg total) by mouth every Mon, Wed, Fri. Beginning on 2022  Keerthi Mayorga MD       Family History:  Family History   Problem Relation Name Age of Onset    Diabetes Mother      Heart disease Mother      Stroke Sister      Cancer Brother      Diabetes Sister         Social History:  Social History     Tobacco Use    Smoking status: Never    Smokeless tobacco: Never   Substance Use Topics    Alcohol use: No     Comment: occassional    Drug use: No          Objective:   Last 24 Hour Vital Signs:  BP  Min: 132/66  Max: 162/70  Temp  Av °F (36.7 °C)  Min: 97.2 °F (36.2 °C)  Max: 98.6 °F (37 °C)  Pulse  Av.8  Min: 41  Max: 110  Resp  Av  Min: 18  Max: 18  SpO2  Av.6 %  Min: 96 %  Max: 100 %  Height  Av' 10" (177.8 cm)  Min: 5' 10" (177.8 cm)  Max: 5' 10" (177.8 cm)  Weight  Av kg (176 lb 5.9 oz)  Min: 80 kg (176 lb 5.9 oz)  Max: 80 kg (176 lb 5.9 oz)  I/O last 3 completed shifts:  In: -   Out: 1531 [Urine:1530; Stool:1]    Physical Examination:  Vitals reviewed.   Constitutional:       Appearance: Normal appearance.   HENT:      Head: Normocephalic and atraumatic.   Cardiovascular:      Rate and Rhythm: Normal rate and regular rhythm.      Heart sounds: Normal heart sounds.   Pulmonary:      Effort: Pulmonary effort is normal.      Breath sounds: Normal " breath sounds.   Musculoskeletal:         General: No swelling, L AKA  Skin:     General: Skin is warm and dry.   Psychiatric:         Mood and Affect: Mood normal.         Behavior: Behavior normal.         Thought Content: Thought content normal.   Laboratory:  Most Recent Data:  CBC:   Lab Results   Component Value Date    WBC 9.56 08/05/2024    HGB 8.3 (L) 08/05/2024     08/05/2024    MCV 86 08/05/2024    RDW 17.9 (H) 08/05/2024     BMP:   Lab Results   Component Value Date     08/05/2024    K 3.8 08/05/2024     08/05/2024    CO2 25 08/05/2024    GLU 94 08/05/2024    BUN 14 08/05/2024    CREATININE 1.7 (H) 08/05/2024    CALCIUM 8.1 (L) 08/05/2024    MG 1.6 08/05/2024    PHOS 3.2 08/05/2024     LFTs:   Lab Results   Component Value Date    PROT 6.2 08/05/2024    ALBUMIN 1.6 (L) 08/05/2024    AST 12 08/05/2024    ALKPHOS 57 08/05/2024    ALT 8 (L) 08/05/2024     Anemia:   Lab Results   Component Value Date    IRON 46 06/27/2024    TIBC 107 (L) 06/27/2024    FERRITIN 697 (H) 06/27/2024    [id number] 504 03/17/2024    FOLATE 4.2 02/19/2021     Urinalysis:   Lab Results   Component Value Date    LABURIN No growth 07/25/2024    COLORU Yellow 07/25/2024    CLARITYU Slightly Cloudy 04/22/2022    SPECGRAV 1.015 07/25/2024    NITRITE Negative 07/25/2024    KETONESU Negative 07/25/2024    UROBILINOGEN Negative 07/25/2024    WBCUA 44 (H) 07/25/2024       Trended Lab Data:  Recent Labs   Lab 08/01/24  0516 08/02/24  0522 08/03/24  0306 08/04/24  0335 08/05/24  0344   WBC 16.84*  --   --  9.41 9.56     --   --  363 349   MCV 86  --   --  85 86   RDW 18.1*  --   --  18.0* 17.9*      < > 141 142 141   K 5.0   < > 4.3 4.2 3.8      < > 106 106 107   CO2 27   < > 22* 27 25   BUN 24*   < > 22 17 14   CREATININE 1.8*   < > 1.9* 1.7* 1.7*      < > 155* 117* 94   CALCIUM 8.3*   < > 8.4* 8.2* 8.1*   PROT  --   --   --  6.4 6.2   ALBUMIN 1.6*   < > 1.7* 1.6* 1.6*   AST  --   --   --  11 12    ALKPHOS  --   --   --  60 57   ALT  --   --   --  9* 8*    < > = values in this interval not displayed.         Thank you for allowing us to participate in the care of this patient.     Chuck Marie, MS4  [facility] Nephrology

## 2024-08-05 NOTE — CONSULTS
Wound care consult received- pt known to wound care nurse from previous admission for healing stage 4 pressure injuries to bilateral buttocks - spoke with Dr. Condon and placed wound care orders.  Nurse has already applied wound dressings as directed and waffle overlay is in place.   Podiatry to manage R foot wounds if pt does not transfer back to nursing home tomorrow.

## 2024-08-05 NOTE — PLAN OF CARE
Problem: Adult Inpatient Plan of Care  Goal: Plan of Care Review  Outcome: Progressing  Goal: Absence of Hospital-Acquired Illness or Injury  Outcome: Progressing  Goal: Optimal Comfort and Wellbeing  Outcome: Progressing     Problem: Diabetes Comorbidity  Goal: Blood Glucose Level Within Targeted Range  Outcome: Progressing     Problem: Wound  Goal: Absence of Infection Signs and Symptoms  Outcome: Progressing  Goal: Optimal Pain Control and Function  Outcome: Progressing  Goal: Skin Health and Integrity  Outcome: Progressing     Problem: Fall Injury Risk  Goal: Absence of Fall and Fall-Related Injury  Outcome: Progressing     Problem: Skin Injury Risk Increased  Goal: Skin Health and Integrity  Outcome: Progressing     POC reviewed with patient. All questions and concerns reviewed. Vital signs stable throughout shift. For full assessment please refer to flowsheet. Fall/ safety precautions implemented & maintained. Bed locked in lowest position, bed alarm activated & audible, & call light in reach. Will continue to monitor.

## 2024-08-05 NOTE — PROGRESS NOTES
[facility] Nephrology Progress Note    Reason for Consult:     YNES evaluation and management.    Assessment and Plan:   Mr. Castañeda is a 75 y/o male with PMHx of T2DM, HTN, BPH with chronic indwelling fung, pAfib, recent Right foot osteomyelitis, who is admitted for AMS (resolved) and YNES.      Non-oliguric YNES  - baseline Cr ~0.7-0.8, eGFR>60  - initially Cr 1.2 on admission, has since increased and remained stable at 1.8-1.9  - UA on admission with proteinuria and hematuria; also with leuk esterases, WBCs, and WBC clumps  - FENa on admission () 0.8% -> indicative of pre-renal etiology  - repeat FENa on  1.2% -> indeterminate, but not reliable in non-oliguric YNES  - Renal US w/o evidence of hydronephrosis, bladder decompressed around fung; increased bilateral renal cortical echogenicity  - currently, etiology of YNES is unclear; differentials are broad and include hypovolemia vs ATN from supratherapeutic vanc levels vs AIN from vanc or cefepime vs infectious GN from osteo  - Dose meds for decreased GFR  - Strict I/O, daily weights  - Please avoid iodinated contrast, gadolinium, fleets, phos-based laxatives, NSAIDs  - given broad differentials and persistent YNES, patient would benefit from renal biopsy to help determine etiology and guide management  - patient states that he will think about it for now, recommend consult to IR for renal biopsy as it would likely not be done until early next week anyway  - good UOP overnight with slight improvement in Cr  - As renal function does not worsens, it stay put, would not recommend renal biopsy at present, will monitor Renal Fxn daily  -  as he is making good amount of urine, made 1 L urine in last 24 hr, I expect renal recovery  -He already has PICC line for long term antibiotics, likely continue to use that         We will continue to follow. Please call with any questions.    Objective:   Last 24 Hour Vital Signs:  BP  Min: 132/66  Max: 162/70  Temp  Av °F  "(36.7 °C)  Min: 97.2 °F (36.2 °C)  Max: 98.6 °F (37 °C)  Pulse  Av.8  Min: 41  Max: 110  Resp  Av  Min: 18  Max: 18  SpO2  Av.6 %  Min: 96 %  Max: 100 %  Height  Av' 10" (177.8 cm)  Min: 5' 10" (177.8 cm)  Max: 5' 10" (177.8 cm)  Weight  Av kg (176 lb 5.9 oz)  Min: 80 kg (176 lb 5.9 oz)  Max: 80 kg (176 lb 5.9 oz)  I/O last 3 completed shifts:  In: -   Out: 1531 [Urine:1530; Stool:1]    Physical Examination:  Vitals reviewed.   Constitutional:       Appearance: Normal appearance.   HENT:      Head: Normocephalic and atraumatic.   Cardiovascular:      Rate and Rhythm: Normal rate and regular rhythm.      Heart sounds: Normal heart sounds.   Pulmonary:      Effort: Pulmonary effort is normal.      Breath sounds: Normal breath sounds.   Abdominal:      General: Bowel sounds are normal.      Palpations: Abdomen is soft.   Musculoskeletal:         General: No swelling.   Skin:     General: Skin is warm and dry.   Neurological:      General: No focal deficit present.      Mental Status: He is alert and oriented to person, place, and time.      Motor: Weakness present.   Psychiatric:         Mood and Affect: Mood normal.         Behavior: Behavior normal.         Thought Content: Thought content normal.   Laboratory:  Most Recent Data:  CBC:   Lab Results   Component Value Date    WBC 9.56 2024    HGB 8.3 (L) 2024     2024    MCV 86 2024    RDW 17.9 (H) 2024     BMP:   Lab Results   Component Value Date     2024    K 3.8 2024     2024    CO2 25 2024    GLU 94 2024    BUN 14 2024    CREATININE 1.7 (H) 2024    CALCIUM 8.1 (L) 2024    MG 1.6 2024    PHOS 3.2 2024     LFTs:   Lab Results   Component Value Date    PROT 6.2 2024    ALBUMIN 1.6 (L) 2024    AST 12 2024    ALKPHOS 57 2024    ALT 8 (L) 2024     Anemia:   Lab Results   Component Value Date    IRON 46 " 06/27/2024    TIBC 107 (L) 06/27/2024    FERRITIN 697 (H) 06/27/2024    [id number] 504 03/17/2024    FOLATE 4.2 02/19/2021     Urinalysis:   Lab Results   Component Value Date    LABURIN No growth 07/25/2024    COLORU Yellow 07/25/2024    CLARITYU Slightly Cloudy 04/22/2022    SPECGRAV 1.015 07/25/2024    NITRITE Negative 07/25/2024    KETONESU Negative 07/25/2024    UROBILINOGEN Negative 07/25/2024    WBCUA 44 (H) 07/25/2024       Trended Lab Data:  Recent Labs   Lab 08/01/24  0516 08/02/24  0522 08/03/24  0306 08/04/24  0335 08/05/24  0344   WBC 16.84*  --   --  9.41 9.56     --   --  363 349   MCV 86  --   --  85 86   RDW 18.1*  --   --  18.0* 17.9*      < > 141 142 141   K 5.0   < > 4.3 4.2 3.8      < > 106 106 107   CO2 27   < > 22* 27 25   BUN 24*   < > 22 17 14   CREATININE 1.8*   < > 1.9* 1.7* 1.7*      < > 155* 117* 94   CALCIUM 8.3*   < > 8.4* 8.2* 8.1*   PROT  --   --   --  6.4 6.2   ALBUMIN 1.6*   < > 1.7* 1.6* 1.6*   AST  --   --   --  11 12   ALKPHOS  --   --   --  60 57   ALT  --   --   --  9* 8*    < > = values in this interval not displayed.       Other Relevant Results:  Radiology:    Imaging has been reviewed personally.             Thank you for allowing us to participate in the care of this patient. Please contact me if you have any questions regarding this consult.    Kaylene Villegas MD  [facility], [facility]

## 2024-08-05 NOTE — PROGRESS NOTES
"[facility] Medicine Progress Note    Primary Team: [facility] Hospitalist Team B  Attending Physician: Vinod Elise MD  Resident: Dirk Condon  Intern: Dutch Huynh    Subjective/Interval History      Patient seen and examined this morning.  Reports he currently feels well and is in no active complaints.  Vital signs remained stable overnight.  Currently planning with nephrology whether renal biopsy is currently indicated for patient.  Switched vancomycin for daptomycin, continuing zosyn for chronic osteomyelitis.       Objective   Last 24 Hour Vital Signs:  BP  Min: 132/66  Max: 162/70  Temp  Av °F (36.7 °C)  Min: 97.2 °F (36.2 °C)  Max: 98.6 °F (37 °C)  Pulse  Av.8  Min: 41  Max: 110  Resp  Av  Min: 18  Max: 18  SpO2  Av.6 %  Min: 96 %  Max: 100 %  Height  Av' 10" (177.8 cm)  Min: 5' 10" (177.8 cm)  Max: 5' 10" (177.8 cm)  Weight  Av kg (176 lb 5.9 oz)  Min: 80 kg (176 lb 5.9 oz)  Max: 80 kg (176 lb 5.9 oz)  I/O last 3 completed shifts:  In: -   Out: 1531 [Urine:1530; Stool:1]    Physical Examination:  Physical Exam  Vitals reviewed.   Constitutional:       Appearance: Normal appearance. He is normal weight. He is not ill-appearing or toxic-appearing.   HENT:      Head: Normocephalic.      Nose: Nose normal.      Mouth/Throat:      Mouth: Mucous membranes are moist.   Eyes:      Pupils: Pupils are equal, round, and reactive to light.   Cardiovascular:      Rate and Rhythm: Normal rate and regular rhythm.      Pulses: Normal pulses.      Heart sounds: Normal heart sounds.   Pulmonary:      Effort: Pulmonary effort is normal.      Breath sounds: Normal breath sounds.   Abdominal:      General: Abdomen is flat.      Palpations: Abdomen is soft.   Genitourinary:     Comments: Sams in place draining clear urine  Musculoskeletal:      Cervical back: Normal range of motion.      Comments: S/p left AKA; well healed  Stage IV sacral wound currently bandaged  Right heel wound currently bandaged in " boot    Skin:     Capillary Refill: Capillary refill takes less than 2 seconds.   Neurological:      General: No focal deficit present.      Mental Status: He is alert and oriented to person, place, and time. Mental status is at baseline.   Psychiatric:         Mood and Affect: Mood normal.         Behavior: Behavior normal.         Thought Content: Thought content normal.         Judgment: Judgment normal.          Laboratory and Microbiology:  I have independently reviewed data.    Imaging and EKGs:  I have independently reviewed data.    Assessment & Plan     Saji Castañeda is a 74 y.o. male with PMHx of T2DM, HTN, HLD, osteomyelitis of R foot admitted on 8/2/2024 for encephalopathy thought to be 2/2 cefepime induced neurotoxicity (now resolved) and workup and management of YNES.    Encephalopathy, resolved  - Initially presented with expressive aphasia and bilateral upper extremity tremors. CT and MRI negative for acute process  - Now significantly improved. AAOx4   - Thought to be 2/2 cefepime induced neurotoxicity.   - Transitioned from cefepime to zosyn.   - Will reach out to neurology to see if any follow up recommended    Osteomyelitis of RLE  - R heal OM diagnosed during admission 6/26-7/11 with bone cx positive for pseudomonas and MRSA. Discharged on vanc and cefepime for 6 week course.   - Transitioned from cefepime to zosyn as above.  - Continuing with Daptomycin and Zosyn per ID recommendations  - CK WNL; will follow weekly   - Continue wound care     YNES  - Baseline Cr 0.7-0.8  - Nephrology following, appreciate recs   - Currently, etiology of YNES is unclear; differentials are broad and include hypovolemia vs ATN from supratherapeutic vanc levels vs AIN from vanc or cefepime vs infectious GN from osteo   - Dose meds for decreased GFR  - Strict I/O, daily weights  - Avoid iodinated contrast, gadolinium, fleets, phos-based laxatives, NSAIDs   - Per nephrology, would benefit from renal biopsy given broad  differential to help determine etiology and guide management.   - Consult to IR for renal biopsy. Holding apixaban.    T2DM  - Last A1c 6 on 7/25  - Home regimen Lantus 12u BID/Novolog 5u with meals  - Currently on Lantus 6u BID and low dose SSI  - POC glucose with meals and at bedtime  - BG goal 140-180 while inpatient  - Will continue to monitor and adjust as needed     pAfib  - Continue eliquis 5 mg BID     HTN  - Continue amlodipine 5 mg daily  - Can transition back to home med of nifedipine 90 mg     BPH  - Continue tamsulosin 0.4 mg     Anemia of chronic disease  - Trend CBC  - Transfuse if Hb<7      Code Status: Full  DVT Prophylaxis: Holding apixaban until after biopsy.   Diet: diabetic 2000 calorie  Disposition: Currently receiving IV abx for OM. Workup for YNES pending, possible renal biopsy with IR this week.     Case will be discussed with attending physician and attestation to follow, please appreciate.     Dirk Condon MD  [facility] Internal Medicine Resident, PGY-2  [facility] Hospitalist Team B    [facility] Medicine Hospitalist Pager numbers:   [facility] Hospitalist Medicine Team A (Lorne/Riki): [phone]  [facility] Hospitalist Medicine Team B (Arik/Lucio):  [phone]

## 2024-08-06 VITALS
SYSTOLIC BLOOD PRESSURE: 119 MMHG | WEIGHT: 176.38 LBS | HEIGHT: 70 IN | BODY MASS INDEX: 25.25 KG/M2 | HEART RATE: 97 BPM | DIASTOLIC BLOOD PRESSURE: 81 MMHG | OXYGEN SATURATION: 99 % | RESPIRATION RATE: 19 BRPM | TEMPERATURE: 98 F

## 2024-08-06 LAB
ALBUMIN SERPL BCP-MCNC: 1.5 G/DL (ref 3.5–5.2)
ALP SERPL-CCNC: 57 U/L (ref 55–135)
ALT SERPL W/O P-5'-P-CCNC: 9 U/L (ref 10–44)
ANION GAP SERPL CALC-SCNC: 10 MMOL/L (ref 8–16)
AST SERPL-CCNC: 11 U/L (ref 10–40)
BILIRUB SERPL-MCNC: <0.1 MG/DL (ref 0.1–1)
BUN SERPL-MCNC: 11 MG/DL (ref 8–23)
CALCIUM SERPL-MCNC: 8.2 MG/DL (ref 8.7–10.5)
CHLORIDE SERPL-SCNC: 106 MMOL/L (ref 95–110)
CO2 SERPL-SCNC: 24 MMOL/L (ref 23–29)
CREAT SERPL-MCNC: 1.6 MG/DL (ref 0.5–1.4)
ERYTHROCYTE [DISTWIDTH] IN BLOOD BY AUTOMATED COUNT: 18.1 % (ref 11.5–14.5)
EST. GFR  (NO RACE VARIABLE): 45 ML/MIN/1.73 M^2
GLUCOSE SERPL-MCNC: 123 MG/DL (ref 70–110)
HCT VFR BLD AUTO: 26.4 % (ref 40–54)
HGB BLD-MCNC: 8.1 G/DL (ref 14–18)
MAGNESIUM SERPL-MCNC: 1.9 MG/DL (ref 1.6–2.6)
MCH RBC QN AUTO: 26.4 PG (ref 27–31)
MCHC RBC AUTO-ENTMCNC: 30.7 G/DL (ref 32–36)
MCV RBC AUTO: 86 FL (ref 82–98)
PHOSPHATE SERPL-MCNC: 3.3 MG/DL (ref 2.7–4.5)
PLATELET # BLD AUTO: 372 K/UL (ref 150–450)
PMV BLD AUTO: 9.1 FL (ref 9.2–12.9)
POCT GLUCOSE: 122 MG/DL (ref 70–110)
POCT GLUCOSE: 177 MG/DL (ref 70–110)
POCT GLUCOSE: 182 MG/DL (ref 70–110)
POTASSIUM SERPL-SCNC: 4 MMOL/L (ref 3.5–5.1)
PROT SERPL-MCNC: 6.1 G/DL (ref 6–8.4)
RBC # BLD AUTO: 3.07 M/UL (ref 4.6–6.2)
SODIUM SERPL-SCNC: 140 MMOL/L (ref 136–145)
WBC # BLD AUTO: 8.47 K/UL (ref 3.9–12.7)

## 2024-08-06 PROCEDURE — 25000003 PHARM REV CODE 250

## 2024-08-06 PROCEDURE — 85027 COMPLETE CBC AUTOMATED: CPT

## 2024-08-06 PROCEDURE — 63600175 PHARM REV CODE 636 W HCPCS

## 2024-08-06 PROCEDURE — 36415 COLL VENOUS BLD VENIPUNCTURE: CPT

## 2024-08-06 PROCEDURE — 84100 ASSAY OF PHOSPHORUS: CPT

## 2024-08-06 PROCEDURE — 83735 ASSAY OF MAGNESIUM: CPT

## 2024-08-06 PROCEDURE — 80053 COMPREHEN METABOLIC PANEL: CPT

## 2024-08-06 RX ORDER — DIPHENHYDRAMINE HCL 25 MG
25 CAPSULE ORAL EVERY 6 HOURS PRN
Start: 2024-08-06

## 2024-08-06 RX ADMIN — GABAPENTIN 100 MG: 100 CAPSULE ORAL at 09:08

## 2024-08-06 RX ADMIN — PANTOPRAZOLE SODIUM 40 MG: 40 TABLET, DELAYED RELEASE ORAL at 09:08

## 2024-08-06 RX ADMIN — PIPERACILLIN SODIUM AND TAZOBACTAM SODIUM 4.5 G: 4; .5 INJECTION, POWDER, LYOPHILIZED, FOR SOLUTION INTRAVENOUS at 12:08

## 2024-08-06 RX ADMIN — WHITE PETROLATUM: 1.75 OINTMENT TOPICAL at 09:08

## 2024-08-06 RX ADMIN — APIXABAN 5 MG: 5 TABLET, FILM COATED ORAL at 09:08

## 2024-08-06 RX ADMIN — PIPERACILLIN SODIUM AND TAZOBACTAM SODIUM 4.5 G: 4; .5 INJECTION, POWDER, LYOPHILIZED, FOR SOLUTION INTRAVENOUS at 09:08

## 2024-08-06 RX ADMIN — TAMSULOSIN HYDROCHLORIDE 0.4 MG: 0.4 CAPSULE ORAL at 09:08

## 2024-08-06 RX ADMIN — DAPTOMYCIN 480 MG: 350 INJECTION, POWDER, LYOPHILIZED, FOR SOLUTION INTRAVENOUS at 11:08

## 2024-08-06 RX ADMIN — MUPIROCIN: 20 OINTMENT TOPICAL at 09:08

## 2024-08-06 RX ADMIN — AMLODIPINE BESYLATE 5 MG: 5 TABLET ORAL at 09:08

## 2024-08-06 RX ADMIN — INSULIN GLARGINE 6 UNITS: 100 INJECTION, SOLUTION SUBCUTANEOUS at 09:08

## 2024-08-06 NOTE — DISCHARGE SUMMARY
hospitals Hospital Medicine Discharge Summary    Primary Team: hospitals Hospitalist Team B  Attending Physician: Vinod Elise MD  Resident: Dirk Condon MD  Intern: Dutch Huynh MD    Date of Admit: 8/2/2024  Date of Discharge: 8/6/2024    Discharge to: [facility]  Condition: Stable    Discharge Diagnoses     Patient Active Problem List   Diagnosis    Tinea pedis    Wound, open, foot with complication    Type 2 diabetes mellitus with diabetic neuropathy, unspecified    Essential hypertension    Type 2 diabetes mellitus, with long-term current use of insulin    Cataract cortical, senile    YNES (acute kidney injury)    Anemia, chronic disease    Asymptomatic Mobitz (type) I (Wenckebach's) atrioventricular block    Peripheral arterial disease    PVD (peripheral vascular disease)    Hypokalemia    Morbid obesity    Chronic deep vein thrombosis (DVT) of right upper extremity    Nonhealing ulcer of right lower leg with fat layer exposed    Hypergranulation    Osteomyelitis of right lower extremity    Paroxysmal atrial fibrillation    History of complete heart block    Blurred vision, left eye    Other hyperlipidemia    Stage 3b chronic kidney disease    History of amputation of left high mid foot     Long term current use of insulin    Chronic anticoagulation    Pacemaker    Palliative care encounter    Altered mental status    Wounds, multiple    Pressure injury of ankle, unstageable-lateral    Pressure injury of right heel, stage 4    Pressure injury of buttock, stage 4    Hypernatremia    Acute metabolic encephalopathy    BPH (benign prostatic hyperplasia)    A-fib       Consultants and Procedures     Consultants:  Nephrology  Infectious Disease  Interventional Radiology    Imaging:  US retroperitoneal (7/31/2024)  MRI C-spine (7/29/2024)  MRI Brain (7/29/2024)    Subjective History (8/6/2024)  Pt reports feeling well overall this morning. Renal biopsy deferred due to improvement in Cr. Cr today improved to 1.6 down from 1.8.  Plan to discharge today on 8/6/2024 with plan for outpatient nephrology and PCP follow-up.    Physical Exam (8/6/2024)  Physical Exam  Constitutional:       General: He is not in acute distress.     Appearance: Normal appearance. He is not ill-appearing.   Cardiovascular:      Rate and Rhythm: Normal rate and regular rhythm.      Pulses: Normal pulses.      Heart sounds: Normal heart sounds. No murmur heard.     No friction rub. No gallop.   Pulmonary:      Breath sounds: No wheezing, rhonchi or rales.   Abdominal:      General: Abdomen is flat.      Palpations: Abdomen is soft.   Genitourinary:     Comments: Sams in place draining clear urine  Musculoskeletal:      Comments: S/p left AKA; well healed  Stage IV sacral wound currently bandaged  Right heel wound currently bandaged in boot       Skin:     General: Skin is warm and dry.   Neurological:      General: No focal deficit present.      Mental Status: He is alert and oriented to person, place, and time.          Vitals (8/6/2024)  Vitals:    08/06/24 1206   BP:    Pulse: 94   Resp:    Temp:            Brief History of Present Illness      Saji Castañeda is a 74 y.o. Male with a PMH of T2DM on long-term insulin, PVD with diabetic ulcer s/p left AKA (3/27/2024), Chronic Multifactorial Anemia, BPH s/p Chronic Sams, paroxysmal A-fib on eliquis, CHB s/p PPM (10/12/2023), CKD3, Multifactorial HTN, and HLD Osteomyelitis of R foot who initially presented on 7/25/2024 for acute encephalopathy. With known history of osteomyelitis on Vanc and Cefipime, was transitioned off cefepime to Zosyn in case change in mental status was related to cefepime neurotoxicity. Neurology was consulted and recommended MRI brain w and w/o contrast and MRI C-spine, and was subsequently transported to [facility] for MRI as pt had a pacemaker. At [facility], mental status began to improve and MRI was performed which showed no acute infarction or hydrocephalus.    At [facility], pt was  noted to have significant YNES on 7/31 (cr 1.2 -> 1.6). Nephrology was consulted and thought that it was likely related to Vancomycin. Cr plateaud at 1.8 on 8/2/24 and was transferred back to [facility] for continued management. Nephrology consulted at [facility] and believe that most likely it is due to Vancomycin but considered biopsy due to slow improvement and broad differential. Ultimately deferred kidney biopsy due to improvement in kidney function. Switched Vancomycin to daptomycin, with gradual improvement of kidney function. Deemed stable for discharge on 8/6/2024 with outpatient follow-up with nephrology, cardiology, and PCP.     For the full HPI please refer to the History & Physical from this admission.    Hospital Course By Problem with Pertinent Findings     #Encephalopathy, resolved  - Initially presented with expressive aphasia and bilateral upper extremity tremors. CT and MRI negative for acute process  - Now significantly improved. AAOx4   - Thought to be 2/2 cefepime induced neurotoxicity.   - Transitioned from cefepime to zosyn.   - follow-up with PCP    #YNES  - Baseline Cr 0.7-0.8  - Nephrology consulted, appreciate recs, believe that is is likely secondary to vancomycin toxicity, deferred renal biopsy due to gradual improvement of Cr  - continue to monitor outpatient  - follow-up with PCP and nephrology     #Osteomyelitis of RLE  - R heal OM diagnosed during admission 6/26-7/11 with bone cx positive for pseudomonas and MRSA. Discharged on vanc and cefepime for 6 week course.   - Transitioned from cefepime to zosyn as above.  - Continue with Daptomycin and Zosyn outpatient until 8/12 per ID recommendations  - CK WNL at discharge; follow weekly outpatient  - Continue wound care     #T2DM  - Last A1c 6 on 7/25  - Continue home regimen Lantus 12u BID/Novolog 5u with meals    #pAfib  - Continue eliquis 5 mg BID      #HTN  - Continue amlodipine 5 mg daily     #BPH  - Continue tamsulosin 0.4 mg     "  #Anemia of chronic disease  - Hb stable at 8.1 at time of discharge  - follow-up outpatient    Discharge Medications        Medication List        START taking these medications      0.9% NaCl SolP 50 mL with DAPTOmycin 500 mg SolR 480 mg  Inject 480 mg into the vein once daily. Inject 480 mg IV daily for 6 days for 6 days     D5W PgBk 100 mL with piperacillin-tazobactam 4.5 gram SolR 4.5 g  Inject 4.5 g into the vein every 8 (eight) hours. Inject 4.5 g into the vein every 8 (eight) hours. for 6 days for 6 days     diphenhydrAMINE 25 mg capsule  Commonly known as: BENADRYL  Take 1 capsule (25 mg total) by mouth every 6 (six) hours as needed for Itching.            CONTINUE taking these medications      acetaminophen 325 MG tablet  Commonly known as: TYLENOL     apixaban 5 mg Tab  Commonly known as: ELIQUIS  Take 1 tablet (5 mg total) by mouth 2 (two) times daily.     ascorbic acid (vitamin C) 500 MG tablet  Commonly known as: VITAMIN C     BD AUTOSHIELD DUO PEN NEEDLE 30 gauge x 3/16" Ndle  Generic drug: pen needle,diabetic dual safty     BD ULTRA-FINE ADITYA PEN NEEDLE 32 gauge x 5/32" Ndle  Generic drug: pen needle, diabetic  USE FOUR TIMES DAILY WITH MEALS AND NIGHTLY     blood sugar diagnostic Strp  Commonly known as: CONTOUR TEST STRIPS  1 strip by Misc.(Non-Drug; Combo Route) route 4 (four) times daily. Use to check blood glucose 4x daily     ergocalciferol 50,000 unit Cap  Commonly known as: ERGOCALCIFEROL  Take 1 capsule (50,000 Units total) by mouth Every Friday. for 10 doses     ferrous sulfate 325 (65 FE) MG EC tablet     FLOMAX 0.4 mg Cap  Generic drug: tamsulosin     furosemide 20 MG tablet  Commonly known as: LASIX  Take 1 tablet (20 mg total) by mouth once daily.     gabapentin 300 MG capsule  Commonly known as: NEURONTIN     * insulin aspart U-100 100 unit/mL injection  Commonly known as: NovoLOG     * insulin aspart U-100 100 unit/mL (3 mL) Inpn pen  Commonly known as: NovoLOG  Inject 5 Units into " the skin 3 (three) times daily with meals.     lancets Misc  Commonly known as: MICROLET LANCET  1 each by Misc.(Non-Drug; Combo Route) route 4 (four) times daily. Use to check blood sugars 4x daily     LANTUS SOLOSTAR U-100 INSULIN 100 unit/mL (3 mL) Inpn pen  Generic drug: insulin glargine U-100 (Lantus)     menthol-zinc oxide 0.44-20.6 % Oint  Commonly known as: CALMOSEPTINE     multivitamin per tablet  Commonly known as: THERAGRAN     NIFEdipine 90 MG (OSM) 24 hr tablet  Commonly known as: PROCARDIA-XL  Take 1 tablet (90 mg total) by mouth once daily.     oxyCODONE 5 MG immediate release tablet  Commonly known as: ROXICODONE  Take 1 tablet (5 mg total) by mouth every 6 (six) hours as needed for Pain.     pantoprazole 40 MG tablet  Commonly known as: PROTONIX     potassium chloride SA 20 MEQ tablet  Commonly known as: K-DUR,KLOR-CON     povidone-iodine 10 % external solution  Commonly known as: BETADINE     SantyL ointment  Generic drug: collagenase     TRUE METRIX GLUCOSE METER Misc  Generic drug: blood-glucose meter     zinc sulfate 50 mg zinc (220 mg) capsule  Commonly known as: ZINCATE           * This list has 2 medication(s) that are the same as other medications prescribed for you. Read the directions carefully, and ask your doctor or other care provider to review them with you.                STOP taking these medications      D5W SolP 250 mL with vancomycin 1.25 gram SolR 1,250 mg               Where to Get Your Medications        Information about where to get these medications is not yet available    Ask your nurse or doctor about these medications  0.9% NaCl SolP 50 mL with DAPTOmycin 500 mg SolR 480 mg  D5W PgBk 100 mL with piperacillin-tazobactam 4.5 gram SolR 4.5 g  diphenhydrAMINE 25 mg capsule         Discharge Information:     Diet:  Cardiac diet    Physical Activity:  As tolerated             Instructions:  1. Take all medications as prescribed  2. Keep all follow-up appointments  3. Return to  the hospital or call your primary care physicians if any worsening symptoms such as fever, chest pain, shortness of breath, return of symptoms, or any other concerns.    Follow-Up Appointments:  Nephrology  Cardiology  PCP    Dutch Huynh MD  [facility] Internal Medicine/Pediatrics PGY-I  [facility] Internal Medicine Team B

## 2024-08-06 NOTE — PROGRESS NOTES
[facility]    FACILITY TRANSFER ORDERS      Patient Name: Saji Castañeda  YOB: 1950    PCP: Vinod Elise MD   PCP Address: [address] / [place] [address]  PCP Phone Number: [phone]  PCP Fax: [phone]    Encounter Date: 08/06/2024    Admit to: [facility]    Vital Signs:  Routine    Diagnoses:   Active Hospital Problems    Diagnosis  POA    *Acute metabolic encephalopathy [G93.41]  Yes    A-fib [I48.91]  Yes    Osteomyelitis of right lower extremity [M86.9]  Yes    YNES (acute kidney injury) [N17.9]  Yes      Resolved Hospital Problems   No resolved problems to display.       Allergies:Review of patient's allergies indicates:  No Known Allergies    Diet: cardiac diet    Activities: Activity as tolerated with assistance     Goals of Care Treatment Preferences:  Code Status: Full Code    Health care agent: Kandy Castañeda  [facility] agent number: [phone]       [place]: Yes         Nursing: PICC line care daily    Labs:  CK   Weekly      CONSULTS:    Physical Therapy to evaluate and treat. , Occupational Therapy to evaluate and treat., and  to evaluate for community resources/long-range planning.    MISCELLANEOUS CARE:  Routine Skin for Bedridden Patients: Apply moisture barrier cream to all skin folds and wet areas in perineal area daily and after baths and all bowel movements.    WOUND CARE ORDERS  Yes: Pressure Ulcer(s) Stage IV:  Location: Sacrum    Consult ET nurse        Apply the following to wound:   Wound gel: Daily (frequency) and Foot Ulcer:  Location: R heel    Dry or minimal exudate wound: Apply wound gel daily (supplied by [facility]), cover with telfa dressing    Medications: Review discharge medications with patient and family and provide education.         Medication List        START taking these medications      0.9% NaCl SolP 50 mL with DAPTOmycin 500 mg SolR 480 mg  Inject 480 mg into the vein once daily. Inject 480 mg IV daily for 6 days  "for 6 days     D5W PgBk 100 mL with piperacillin-tazobactam 4.5 gram SolR 4.5 g  Inject 4.5 g into the vein every 8 (eight) hours. Inject 4.5 g into the vein every 8 (eight) hours. for 6 days for 6 days     diphenhydrAMINE 25 mg capsule  Commonly known as: BENADRYL  Take 1 capsule (25 mg total) by mouth every 6 (six) hours as needed for Itching.            CONTINUE taking these medications      acetaminophen 325 MG tablet  Commonly known as: TYLENOL  Take 650 mg by mouth every 6 (six) hours as needed for Pain.     apixaban 5 mg Tab  Commonly known as: ELIQUIS  Take 1 tablet (5 mg total) by mouth 2 (two) times daily.     ascorbic acid (vitamin C) 500 MG tablet  Commonly known as: VITAMIN C  Take 500 mg by mouth 2 (two) times daily.     BD AUTOSHIELD DUO PEN NEEDLE 30 gauge x 3/16" Ndle  Generic drug: pen needle,diabetic dual safty     BD ULTRA-FINE ADITYA PEN NEEDLE 32 gauge x 5/32" Ndle  Generic drug: pen needle, diabetic  USE FOUR TIMES DAILY WITH MEALS AND NIGHTLY     blood sugar diagnostic Strp  Commonly known as: CONTOUR TEST STRIPS  1 strip by Misc.(Non-Drug; Combo Route) route 4 (four) times daily. Use to check blood glucose 4x daily     ergocalciferol 50,000 unit Cap  Commonly known as: ERGOCALCIFEROL  Take 1 capsule (50,000 Units total) by mouth Every Friday. for 10 doses     ferrous sulfate 325 (65 FE) MG EC tablet  Take 325 mg by mouth 2 (two) times daily.     FLOMAX 0.4 mg Cap  Generic drug: tamsulosin  Take 0.4 mg by mouth once daily.     furosemide 20 MG tablet  Commonly known as: LASIX  Take 1 tablet (20 mg total) by mouth once daily.     gabapentin 300 MG capsule  Commonly known as: NEURONTIN  Take 300 mg by mouth 2 (two) times daily.     * insulin aspart U-100 100 unit/mL injection  Commonly known as: NovoLOG  Inject 2-10 Units into the skin 3 (three) times daily before meals. 0-149=0  150-200: 2 units  201-250: 4 units  251-300: 6 units  301-350: 8 units  351-1000=10 units, NOTIFY MD     * insulin " aspart U-100 100 unit/mL (3 mL) Inpn pen  Commonly known as: NovoLOG  Inject 5 Units into the skin 3 (three) times daily with meals.     lancets Misc  Commonly known as: MICROLET LANCET  1 each by Misc.(Non-Drug; Combo Route) route 4 (four) times daily. Use to check blood sugars 4x daily     LANTUS SOLOSTAR U-100 INSULIN 100 unit/mL (3 mL) Inpn pen  Generic drug: insulin glargine U-100 (Lantus)  Inject 12 Units into the skin 2 (two) times a day.     menthol-zinc oxide 0.44-20.6 % Oint  Commonly known as: CALMOSEPTINE  Apply topically as needed.     multivitamin per tablet  Commonly known as: THERAGRAN  Take 1 tablet by mouth once daily.     NIFEdipine 90 MG (OSM) 24 hr tablet  Commonly known as: PROCARDIA-XL  Take 1 tablet (90 mg total) by mouth once daily.     oxyCODONE 5 MG immediate release tablet  Commonly known as: ROXICODONE  Take 1 tablet (5 mg total) by mouth every 6 (six) hours as needed for Pain.     pantoprazole 40 MG tablet  Commonly known as: PROTONIX  Take 40 mg by mouth once daily. Before breakfast     potassium chloride SA 20 MEQ tablet  Commonly known as: K-DUR,KLOR-CON  Take 20 mEq by mouth 2 (two) times daily.     povidone-iodine 10 % external solution  Commonly known as: BETADINE  Apply topically as needed for Wound Care.     SantyL ointment  Generic drug: collagenase  Apply topically once daily. unknown     TRUE METRIX GLUCOSE METER Misc  Generic drug: blood-glucose meter     zinc sulfate 50 mg zinc (220 mg) capsule  Commonly known as: ZINCATE  Take 220 mg by mouth every morning.           * This list has 2 medication(s) that are the same as other medications prescribed for you. Read the directions carefully, and ask your doctor or other care provider to review them with you.                STOP taking these medications      D5W SolP 250 mL with vancomycin 1.25 gram SolR 1,250 mg                Immunizations Administered as of 8/6/2024       Name Date Dose VIS Date Route Exp Date    COVID-19,  MRNA, LN-S, PF (Moderna) 7/13/2021 0.5 mL -- -- --    Lot: [id number]     COVID-19, MRNA, LN-S, PF (Moderna) 7/13/2021 0.5 mL -- Intramuscular --    Site: Left arm     : [employer]     Lot: [id number]     Comment: Adminis     COVID-19, MRNA, LN-S, PF (Moderna) 5/24/2021 0.5 mL -- -- --    Lot: [id number]     COVID-19, MRNA, LN-S, PF (Moderna) 5/24/2021 0.5 mL -- -- --    : [employer]     Lot: [id number]     Comment: Adminis             Some patients may experience side effects after vaccination.  These may include fever, headache, muscle or joint aches.  Most symptoms resolve with 24-48 hours and do not require urgent medical evaluation unless they persist for more than 72 hours or symptoms are concerning for an unrelated medical condition.          _________________________________  Dirk Condon MD  08/06/2024

## 2024-08-06 NOTE — MEDICAL/APP STUDENT
[facility] Nephrology Consult Note    Reason for Consult:     YNES evaluation and management.    Assessment and Plan:     Mr. Castañeda is a 75 y/o male with PMHx of T2DM, HTN, BPH with chronic indwelling fung, pAfib, recent Right foot osteomyelitis, who is admitted for AMS (resolved) and YNES.      Non-oliguric YNES  - baseline Cr ~0.7-0.8, eGFR>60  - initially Cr 1.2 on admission, has since increased and remained stable at 1.8-1.9  - UA on admission with proteinuria and hematuria; also with leuk esterases, WBCs, and WBC clumps  - FENa on admission (7/25) 0.8% -> indicative of pre-renal etiology  - repeat FENa on 8/1 1.2% -> indeterminate, but not reliable in non-oliguric YNES  - Renal US w/o evidence of hydronephrosis, bladder decompressed around fung; increased bilateral renal cortical echogenicity  - currently, etiology of YNES is unclear; differentials are broad and include hypovolemia vs ATN from supratherapeutic vanc levels vs AIN from vanc or cefepime vs infectious GN from osteo  - Dose meds for decreased GFR  - Strict I/O, daily weights  - Please avoid iodinated contrast, gadolinium, fleets, phos-based laxatives, NSAIDs  - given broad differentials and persistent YNES, patient would benefit from renal biopsy to help determine etiology and guide management. As kidney function is stable, biopsy deferred at this time.  - good UOP overnight. Cr downtrending  - as Cr is stable, patient will not need renal biopsy at this time. Follow up with PCP once discharged for renal function monitoring. If patient's function does not improve/worsens, can refer to nephrologist.  - Patient has PICC line for long term antibiotics, continue to use for daptomycin      We will sign off on this patient. Thank you for this consult.    Subjective:      Interval history:   NAEON. Patient is resting comfortably in bed. Denies SOB, swelling, CP, N/V, fever, chills. Patient reports continued urine output and good appetite.      Past Medical  History:  Past Medical History:   Diagnosis Date    Arthritis     legs    Bacteremia due to Gram-negative bacteria 3/23/2021    Diabetes mellitus     Diabetes mellitus, type 2     Hyperlipidemia     Hypertension     Osteomyelitis     Palliative care encounter 5/24/2023       Past Surgical History:  Past Surgical History:   Procedure Laterality Date    ABOVE-KNEE AMPUTATION Left 3/27/2024    Procedure: AMPUTATION, ABOVE KNEE;  Surgeon: Adal Arellano MD;  Location: [address];  Service: Vascular;  Laterality: Left;    ANGIOGRAPHY OF LOWER EXTREMITY N/A 2/3/2021    Procedure: Angiogram Extremity Bilateral;  Surgeon: Ernst Chacko MD;  Location: [address];  Service: Peripheral Vascular;  Laterality: N/A;  7.4 mintues fluoroscopy time  816.15 mGy  170.17 Gycm2    AORTOGRAPHY WITH EXTREMITY RUNOFF Bilateral 2/3/2021    Procedure: AORTOGRAM, WITH EXTREMITY RUNOFF;  Surgeon: Ernst Chacko MD;  Location: [address];  Service: Peripheral Vascular;  Laterality: Bilateral;    DEBRIDEMENT OF FOOT Left 2/23/2021    Procedure: DEBRIDEMENT, LEFT HEEL;  Surgeon: Mayra Schroeder DPM;  Location: [address];  Service: Podiatry;  Laterality: Left;    ESOPHAGOGASTRODUODENOSCOPY N/A 6/28/2024    Procedure: EGD (ESOPHAGOGASTRODUODENOSCOPY);  Surgeon: Adal Chandra MD;  Location: [facility] ENDO;  Service: Gastroenterology;  Laterality: N/A;    FOOT AMPUTATION  October 2010    left high midfoot amputation    IMPLANTATION OF LEADLESS PACEMAKER N/A 10/12/2023    Procedure: PWQOGTOLN-DLVPEORYZ-XTVULSOW;  Surgeon: VANESSA Delatorre MD;  Location: [facility] EP LAB;  Service: Cardiology;  Laterality: N/A;  AVB, MICRA, EH, ANES, [place] [id number]       Allergies:  Review of patient's allergies indicates:  No Known Allergies    Medications:   In-Hospital Scheduled Medications:   amLODIPine  5 mg Oral Daily    apixaban  5 mg Oral BID    DAPTOmycin (CUBICIN) IV (PEDS and ADULTS)  6 mg/kg Intravenous  Q24H    gabapentin  100 mg Oral BID    insulin glargine U-100 (Lantus)  6 Units Subcutaneous BID    mupirocin   Nasal BID    pantoprazole  40 mg Oral Daily    piperacillin-tazobactam (Zosyn) IV (PEDS and ADULTS) (extended infusion is not appropriate)  4.5 g Intravenous Q8H    senna-docusate 8.6-50 mg  1 tablet Oral BID    tamsulosin  0.4 mg Oral Daily    white petrolatum   Topical (Top) Daily      In-Hospital PRN Medications:    Current Facility-Administered Medications:     acetaminophen, 650 mg, Oral, Q4H PRN    dextrose 10%, 12.5 g, Intravenous, PRN    dextrose 10%, 25 g, Intravenous, PRN    diphenhydrAMINE, 25 mg, Oral, Q6H PRN    glucagon (human recombinant), 1 mg, Intramuscular, PRN    glucose, 16 g, Oral, PRN    glucose, 24 g, Oral, PRN    insulin aspart U-100, 0-5 Units, Subcutaneous, QID (AC + HS) PRN    melatonin, 6 mg, Oral, Nightly PRN    naloxone, 0.02 mg, Intravenous, PRN    oxyCODONE, 5 mg, Oral, Q6H PRN    sodium chloride 0.9%, 10 mL, Intravenous, Q12H PRN    sodium chloride 0.9%, 10 mL, Intravenous, Q12H PRN   In-Hospital IV Infusion Medications:     Home Medications:  Prior to Admission medications    Medication Sig Start Date End Date Taking? Authorizing Provider   acetaminophen (TYLENOL) 325 MG tablet Take 650 mg by mouth every 6 (six) hours as needed for Pain.   Yes Provider, Historical   apixaban (ELIQUIS) 5 mg Tab Take 1 tablet (5 mg total) by mouth 2 (two) times daily. 4/3/24  Yes Jori Byrd MD   ascorbic acid, vitamin C, (VITAMIN C) 500 MG tablet Take 500 mg by mouth 2 (two) times daily.   Yes Provider, Historical   D5W SolP 250 mL with vancomycin 1.25 gram SolR 1,250 mg Inject 1,250 mg into the vein Daily. 7/11/24  Yes Lo Melgar NP   ferrous sulfate 325 (65 FE) MG EC tablet Take 325 mg by mouth 2 (two) times daily.   Yes Provider, Historical   furosemide (LASIX) 20 MG tablet Take 1 tablet (20 mg total) by mouth once daily. 4/3/24 4/3/25 Yes Jori Byrd MD  "  gabapentin (NEURONTIN) 300 MG capsule Take 300 mg by mouth 2 (two) times daily. 11/3/23  Yes Provider, Historical   insulin aspart U-100 (NOVOLOG) 100 unit/mL (3 mL) InPn pen Inject 5 Units into the skin 3 (three) times daily with meals. 4/5/24 4/5/25 Yes Jori Byrd MD   insulin aspart U-100 (NOVOLOG) 100 unit/mL injection Inject 2-10 Units into the skin 3 (three) times daily before meals. 0-149=0  150-200: 2 units  201-250: 4 units  251-300: 6 units  301-350: 8 units  351-1000=10 units, NOTIFY MD   Yes Provider, Historical   LANTUS SOLOSTAR U-100 INSULIN 100 unit/mL (3 mL) InPn pen Inject 12 Units into the skin 2 (two) times a day. 6/21/24  Yes Provider, Historical   menthol-zinc oxide (CALMOSEPTINE) 0.44-20.6 % Oint Apply topically as needed.   Yes Provider, Historical   multivitamin (THERAGRAN) per tablet Take 1 tablet by mouth once daily.   Yes Provider, Historical   NIFEdipine (PROCARDIA-XL) 90 MG (OSM) 24 hr tablet Take 1 tablet (90 mg total) by mouth once daily. 4/4/24 4/4/25 Yes Jori Byrd MD   oxyCODONE (ROXICODONE) 5 MG immediate release tablet Take 1 tablet (5 mg total) by mouth every 6 (six) hours as needed for Pain. 5/29/24  Yes Nael Diallo MD   pantoprazole (PROTONIX) 40 MG tablet Take 40 mg by mouth once daily. Before breakfast 2/6/23  Yes Provider, Historical   potassium chloride SA (K-DUR,KLOR-CON) 20 MEQ tablet Take 20 mEq by mouth 2 (two) times daily. 7/22/24  Yes Provider, Historical   povidone-iodine (BETADINE) 10 % external solution Apply topically as needed for Wound Care.   Yes Provider, Historical   SANTYL ointment Apply topically once daily. unknown 6/7/24  Yes Provider, Historical   tamsulosin (FLOMAX) 0.4 mg Cap Take 0.4 mg by mouth once daily.   Yes Provider, Historical   zinc sulfate (ZINCATE) 50 mg zinc (220 mg) capsule Take 220 mg by mouth every morning.   Yes Provider, Historical   BD AUTOSHIELD DUO PEN NEEDLE 30 gauge x 3/16" Ndle  11/3/23   Provider, Historical   BD " "ULTRA-FINE ADITYA PEN NEEDLE 32 gauge x 5/32" Ndle USE FOUR TIMES DAILY WITH MEALS AND NIGHTLY 19   Miriam Duong NP   blood sugar diagnostic (CONTOUR TEST STRIPS) Strp 1 strip by Misc.(Non-Drug; Combo Route) route 4 (four) times daily. Use to check blood glucose 4x daily 24   Jori Byrd MD   ergocalciferol (ERGOCALCIFEROL) 50,000 unit Cap Take 1 capsule (50,000 Units total) by mouth Every Friday. for 10 doses 8/2/24 10/5/24  Dirk Condon MD   lancets (MICROLET LANCET) Misc 1 each by Misc.(Non-Drug; Combo Route) route 4 (four) times daily. Use to check blood sugars 4x daily 24   Jori Byrd MD   TRUE METRIX GLUCOSE METER [facility]  24   Shakeel Sim   hydroCHLOROthiazide (HYDRODIURIL) 25 MG tablet Take 0.5 tablets (12.5 mg total) by mouth every Mon, Wed, Fri. Beginning on 2022  Keerthi Mayorga MD       Family History:  Family History   Problem Relation Name Age of Onset    Diabetes Mother      Heart disease Mother      Stroke Sister      Cancer Brother      Diabetes Sister         Social History:  Social History     Tobacco Use    Smoking status: Never    Smokeless tobacco: Never   Substance Use Topics    Alcohol use: No     Comment: occassional    Drug use: No          Objective:   Last 24 Hour Vital Signs:  BP  Min: 139/60  Max: 188/78  Temp  Av.1 °F (36.7 °C)  Min: 97.4 °F (36.3 °C)  Max: 98.6 °F (37 °C)  Pulse  Av.9  Min: 51  Max: 81  Resp  Av.7  Min: 17  Max: 20  SpO2  Av.2 %  Min: 96 %  Max: 99 %  I/O last 3 completed shifts:  In: 1080 [P.O.:1080]  Out: 2100 [Urine:2100]    Physical Examination:  Vitals reviewed.   Constitutional:       Appearance: Normal appearance.   HENT:      Head: Normocephalic and atraumatic.   Cardiovascular:      Rate and Rhythm: Normal rate and regular rhythm.      Heart sounds: Normal heart sounds.   Pulmonary:      Effort: Pulmonary effort is normal.      Breath sounds: Normal breath sounds.   Musculoskeletal:  "        General: No swelling, L AKA; R heel wound in boot, R toe wounds  Skin:     General: Skin is warm and dry.   Psychiatric:         Mood and Affect: Mood normal.         Behavior: Behavior normal.         Thought Content: Thought content normal.   Laboratory:  Most Recent Data:  CBC:   Lab Results   Component Value Date    WBC 8.47 08/06/2024    HGB 8.1 (L) 08/06/2024     08/06/2024    MCV 86 08/06/2024    RDW 18.1 (H) 08/06/2024     BMP:   Lab Results   Component Value Date     08/06/2024    K 4.0 08/06/2024     08/06/2024    CO2 24 08/06/2024     (H) 08/06/2024    BUN 11 08/06/2024    CREATININE 1.6 (H) 08/06/2024    CALCIUM 8.2 (L) 08/06/2024    MG 1.9 08/06/2024    PHOS 3.3 08/06/2024     LFTs:   Lab Results   Component Value Date    PROT 6.1 08/06/2024    ALBUMIN 1.5 (L) 08/06/2024    AST 11 08/06/2024    ALKPHOS 57 08/06/2024    ALT 9 (L) 08/06/2024     Anemia:   Lab Results   Component Value Date    IRON 46 06/27/2024    TIBC 107 (L) 06/27/2024    FERRITIN 697 (H) 06/27/2024    IWHCCSVZ85 504 03/17/2024    FOLATE 4.2 02/19/2021     Urinalysis:   Lab Results   Component Value Date    LABURIN No growth 07/25/2024    COLORU Yellow 08/05/2024    CLARITYU Slightly Cloudy 04/22/2022    SPECGRAV 1.015 08/05/2024    NITRITE Negative 08/05/2024    KETONESU Negative 08/05/2024    UROBILINOGEN Negative 08/05/2024    WBCUA >100 (H) 08/05/2024       Trended Lab Data:  Recent Labs   Lab 08/04/24  0335 08/05/24  0344 08/06/24  0252   WBC 9.41 9.56 8.47    349 372   MCV 85 86 86   RDW 18.0* 17.9* 18.1*    141 140   K 4.2 3.8 4.0    107 106   CO2 27 25 24   BUN 17 14 11   CREATININE 1.7* 1.7* 1.6*   * 94 123*   CALCIUM 8.2* 8.1* 8.2*   PROT 6.4 6.2 6.1   ALBUMIN 1.6* 1.6* 1.5*   AST 11 12 11   ALKPHOS 60 57 57   ALT 9* 8* 9*         Thank you for allowing us to participate in the care of this patient.     Chuck Marie, MS4  [facility]

## 2024-08-06 NOTE — PROGRESS NOTES
AVS prepared by VN. AVS to be placed in nursing home packet by bedside nurse. VN available for any further needs.   08/06/24 1534   Nurse Notification   Charge Nurse Notified? Yes   Name of Charge Nurse Chantal WORLEY   Bedside Nurse Notified? Yes   Name of Bedside Nurse Marielle ALLISON   Nurse Notfication Method Secure Chat   Nurse Notified Of Other  (d/c to SNF)    Notification    Notified? Yes   Name of  Felicity DOVER    Notification Method Secure Chat    Notified Of Discharge Status

## 2024-08-06 NOTE — PLAN OF CARE
Pt is agreeable with discharge back to [facility] for SNF. Report info given to floor nurse. He will be transported back by hospital ambulance. Transport packet at nurse station.   08/06/24 1530   Final Note   Assessment Type Final Discharge Note   Anticipated Discharge Disposition SNF   Hospital Resources/Appts/Education Provided Appointments scheduled and added to AVS   Post-Acute Status   Post-Acute Authorization Placement   Post-Acute Placement Status Set-up Complete/Auth obtained   Coverage [employer]   Discharge Delays None known at this time

## 2024-08-12 LAB
OHS QRS DURATION: 82 MS
OHS QTC CALCULATION: 428 MS

## 2024-08-13 ENCOUNTER — OUTSIDE PLACE OF SERVICE (OUTPATIENT)
Dept: ADMINISTRATIVE | Facility: OTHER | Age: 74
End: 2024-08-13
Payer: MEDICARE

## 2024-08-14 ENCOUNTER — TELEPHONE (OUTPATIENT)
Dept: FAMILY MEDICINE | Facility: CLINIC | Age: 74
End: 2024-08-14
Payer: MEDICARE

## 2024-08-14 RX ORDER — OXYCODONE HYDROCHLORIDE 5 MG/1
5 TABLET ORAL
Qty: 30 TABLET | Refills: 0 | Status: SHIPPED | OUTPATIENT
Start: 2024-08-14

## 2024-08-28 PROBLEM — R47.01 EXPRESSIVE APHASIA: Status: ACTIVE | Noted: 2024-08-28

## 2024-09-08 NOTE — PROGRESS NOTES
Subjective:   @Patient ID:  Saji Castañeda is a 74 y.o. male who presents for evaluation of No chief complaint on file.      HPI:     Saji Castañeda is a pleasant 74 y.o. Male with a PMH of T2DM on long-term insulin, PVD with diabetic ulcer s/p left AKA (3/27/2024) managed by vascular surgery Dr. Arellano, Chronic Multifactorial Anemia, BPH s/p Chronic Sams, paroxysmal A-fib on eliquis, CHB s/p PPM medtronic (10/12/2023), CKD3, Multifactorial HTN, and HLD Osteomyelitis of R foot  here in clinic to established care. Recently admitted on 7/25/2024 for acute encephalopathy. With known history of osteomyelitis on Vanc and Cefipime, was transitioned off cefepime to Zosyn in case change in mental status was related to cefepime neurotoxicity. Neurology was consulted and recommended MRI brain w and w/o contrast and MRI C-spine, and was subsequently transported to [facility] for MRI as pt had a pacemaker. At [facility], mental status began to improve and MRI was performed which showed no acute infarction or hydrocephalus.        Echo 07/24  Left Ventricle: The left ventricle is normal in size. There is concentric hypertrophy. Normal wall motion. Septal motion is consistent with bundle branch block. There is normal systolic function with a visually estimated ejection fraction of 55 - 60%. Biplane (2D) method of discs ejection fraction is 50%.    Right Ventricle: Normal right ventricular cavity size. Systolic function is normal. TAPSE is 1.96 cm.    Right Atrium: Right atrium is moderately dilated.    Aortic Valve: There is mild aortic regurgitation.    Tricuspid Valve: There is mild regurgitation.    IVC/SVC: Normal venous pressure at 3 mmHg.  US of BLE-arterial  elocity doubling in the right anterior tibial artery which estimates a moderate degree of stenosis in this vessel.     Diminutive right posterior tibial artery which is difficult to visualize likely reflecting areas of severe narrowing noting severe multifocal  atherosclerotic disease visualized on prior CTA runoff.      Patient Active Problem List    Diagnosis Date Noted    Expressive aphasia 08/28/2024    A-fib 08/05/2024    Acute metabolic encephalopathy 07/30/2024    BPH (benign prostatic hyperplasia) 07/30/2024    Hypernatremia 07/26/2024    Pressure injury of ankle, unstageable-lateral 06/28/2024    Pressure injury of right heel, stage 4 06/28/2024    Pressure injury of buttock, stage 4 06/28/2024    Wounds, multiple 06/26/2024    Altered mental status 05/22/2024    Palliative care encounter 05/21/2024    Pacemaker 04/02/2024    History of amputation of left high mid foot  03/07/2024    Long term current use of insulin 03/07/2024     Noted by [facility]  last documented on [id number]      Chronic anticoagulation 03/07/2024    Other hyperlipidemia 03/05/2024    Stage 3b chronic kidney disease 03/05/2024    Blurred vision, left eye 10/09/2023    Paroxysmal atrial fibrillation 10/02/2023    History of complete heart block 10/02/2023    Osteomyelitis of right lower extremity 03/10/2023    Hypergranulation 11/06/2022    Nonhealing ulcer of right lower leg with fat layer exposed 10/09/2022    Chronic deep vein thrombosis (DVT) of right upper extremity 07/06/2022    Hypokalemia 02/18/2021    Morbid obesity 02/18/2021    PVD (peripheral vascular disease) 02/03/2021    Peripheral arterial disease 12/10/2020     - long standing wounds with skin graft to left TMA in [id number]; LE angio in 2014 with patent left CFA, SFA, PFA with 3V run off on LE angiogram by Vascular surgery  - CTA LE with run off 6/2022 with moderate to high grade disease of right popliteal with severely diseased AT, PT, peroneal and multiple occlusion of left popliteal with occluded PT and peroneal and short segment occlusion of AT- patient denies any previous intervention  - BLE arterial ultrasound 3/2023 with similar findings suggestive of bilateral popliteal and BTK disease; seen by  "Vascular surgery at [facility] with recommendation for amputation- patient declined  - transferred to [facility] 05/2023 for evaluation for revascularization; extensive disease and multiple co morbidities along with debility/bed bound status and concern for non compliance, deemed not a  Revascularization candidate and placed on statin and Plavix; no ASA given risk of bleeding with triple therapy        Asymptomatic Mobitz (type) I (Wenckebach's) atrioventricular block 12/06/2019    YNES (acute kidney injury) 10/15/2018    Anemia, chronic disease 10/15/2018    Cataract cortical, senile 06/13/2017    Type 2 diabetes mellitus, with long-term current use of insulin 09/23/2014    Type 2 diabetes mellitus with diabetic neuropathy, unspecified 08/05/2014    Essential hypertension 08/05/2014    Wound, open, foot with complication 02/19/2014    Tinea pedis 06/21/2013                    LABS  CBC  @LABRCNTIP(WBC:3,RBC:3,HGB:3,HCT:3,PLT:3,MCV:3,MCH:3,MCHC:3)@  BMP  @LABRCNTIP(NA:3,K:3,CO2:3,CL:3,BUN:3,Creatinine:3,GLU:3,GFR:3)@    POCT-Glucose  No results found for: "POCTGLUCOSE"    @LABRCNTIP(Calcium:3,MG:3,PHOS:3)@  LFT  @LABRCNTIP(PROT:3,Albumin:3,,Bilitot:3,AST:3,Alkphos:3,ALT:3)@  GFR     COAGS  @LABRCNTIP(PT:3,INR:3,APTT:3)@  CE  @LABRCNTIP(troponini:3,cktotal:3,ckmb:3)@  ABGs  @RIMPTRCEE28(PH,PCO2,PO2,HCO3,POCSATURATED,BE)@  BNP  @LABRCNTIP(BNP:3)@    LAST HbA1c  Lab Results   Component Value Date    HGBA1C 6.0 (H) 07/25/2024       Lipid panel  Lab Results   Component Value Date    CHOL 108 (L) 07/25/2024    CHOL 91 (L) 09/19/2022    CHOL 179 04/06/2016     Lab Results   Component Value Date    HDL 43 07/25/2024    HDL 40 09/19/2022    HDL 49 04/06/2016     Lab Results   Component Value Date    LDLCALC 54.6 (L) 07/25/2024    LDLCALC 37.4 (L) 09/19/2022    LDLCALC 115.4 04/06/2016     Lab Results   Component Value Date    TRIG 52 07/25/2024    TRIG 68 09/19/2022    TRIG 73 04/06/2016     Lab Results   Component Value Date    " CHOLHDL 39.8 07/25/2024    CHOLHDL 44.0 09/19/2022    CHOLHDL 27.4 04/06/2016            Review of Systems   All other systems reviewed and are negative.      Objective:   Gen: AOx3, NAD, comfortable appearing  HEENT: NCAT, PERRL, EOMI, MMM, JVP, no thyromegaly, lymphadenopathy appreciated  CV: RRR, S1/S2 appreciated, no murmur; no gallop or rubs  Resp: CTAB, no increased WOB  Abdomen: Soft, NT, ND, +BS  Ext: L BKA, R extremities with covered.   Skin: Warm, dry, no rashes appreciated  Psych: Good mood, Affect  Neuro: AAOx4, Strength 5/5 in extremities bilaterally; sensation to touch equal throughout, follows commands        Assessment:   Saji Castañeda is a pleasant 74 y.o. Male with a PMH of T2DM on long-term insulin, PVD with diabetic ulcer s/p left AKA (3/27/2024) managed by vascular surgery Dr. Arellano, Chronic Multifactorial Anemia, BPH s/p Chronic Sams, paroxysmal A-fib on eliquis, CHB s/p PPM medtronic (10/12/2023), CKD3, Multifactorial HTN, and HLD Osteomyelitis of R foot  here in clinic to established care.   Plan:   #PVD follows with vascular surgery. Will let them continue them to continue his management  #Osteomyelitis of RLE- per wound care/ID. Per patient not on abx   #T2DM-Last A1c 6 on 7/25; Continue home regimen Lantus 12u BID/Novolog 5u with meals  #pAfib-Continue eliquis 5 mg BID. Tolerating with no s/e.   #HTN- at goal. Continue amlodipine 5 mg daily       Target BP < 130/80 mmHg  Discussed the importance of med compliance, heart healthy diet, and regular exercise.      He expressed verbal understanding and agreed with the plan    Pertinent cardiac images and EKG reviewed independently.    Continue with current medical plan and lifestyle changes.  Return sooner for concerns or questions. If symptoms persist go to the ED.  I have reviewed all pertinent data including patient's medical history in detail and updated the computerized patient record.     Total time spent counseling greater than  "fifty percent of total visit time.  Counseling included discussion regarding imaging findings, diagnosis, possibilities, treatment options, risks and benefits.    Thank you for the opportunity to care for this patient. Will be available for questions if needed.         Patient's Medications   New Prescriptions    No medications on file   Previous Medications    ACETAMINOPHEN (TYLENOL) 325 MG TABLET    Take 650 mg by mouth every 6 (six) hours as needed for Pain.    APIXABAN (ELIQUIS) 5 MG TAB    Take 1 tablet (5 mg total) by mouth 2 (two) times daily.    ASCORBIC ACID, VITAMIN C, (VITAMIN C) 500 MG TABLET    Take 500 mg by mouth 2 (two) times daily.    BD AUTOSHIELD DUO PEN NEEDLE 30 GAUGE X 3/16" NDLE        BD ULTRA-FINE ADITYA PEN NEEDLE 32 GAUGE X 5/32" NDLE    USE FOUR TIMES DAILY WITH MEALS AND NIGHTLY    BLOOD SUGAR DIAGNOSTIC (CONTOUR TEST STRIPS) STRP    1 strip by Misc.(Non-Drug; Combo Route) route 4 (four) times daily. Use to check blood glucose 4x daily    DIPHENHYDRAMINE (BENADRYL) 25 MG CAPSULE    Take 1 capsule (25 mg total) by mouth every 6 (six) hours as needed for Itching.    ERGOCALCIFEROL (ERGOCALCIFEROL) 50,000 UNIT CAP    Take 1 capsule (50,000 Units total) by mouth Every Friday. for 10 doses    FERROUS SULFATE 325 (65 FE) MG EC TABLET    Take 325 mg by mouth 2 (two) times daily.    FUROSEMIDE (LASIX) 20 MG TABLET    Take 1 tablet (20 mg total) by mouth once daily.    GABAPENTIN (NEURONTIN) 300 MG CAPSULE    Take 300 mg by mouth 2 (two) times daily.    INSULIN ASPART U-100 (NOVOLOG) 100 UNIT/ML (3 ML) INPN PEN    Inject 5 Units into the skin 3 (three) times daily with meals.    INSULIN ASPART U-100 (NOVOLOG) 100 UNIT/ML INJECTION    Inject 2-10 Units into the skin 3 (three) times daily before meals. 0-149=0  150-200: 2 units  201-250: 4 units  251-300: 6 units  301-350: 8 units  351-1000=10 units, NOTIFY MD    LANCETS (MICROLET LANCET) MISC    1 each by Misc.(Non-Drug; Combo Route) route 4 (four) " times daily. Use to check blood sugars 4x daily    LANTUS SOLOSTAR U-100 INSULIN 100 UNIT/ML (3 ML) INPN PEN    Inject 12 Units into the skin 2 (two) times a day.    MENTHOL-ZINC OXIDE (CALMOSEPTINE) 0.44-20.6 % OINT    Apply topically as needed.    MULTIVITAMIN (THERAGRAN) PER TABLET    Take 1 tablet by mouth once daily.    NIFEDIPINE (PROCARDIA-XL) 90 MG (OSM) 24 HR TABLET    Take 1 tablet (90 mg total) by mouth once daily.    OXYCODONE (ROXICODONE) 5 MG IMMEDIATE RELEASE TABLET    Take 1 tablet (5 mg total) by mouth every 24 hours as needed for Pain.    PANTOPRAZOLE (PROTONIX) 40 MG TABLET    Take 40 mg by mouth once daily. Before breakfast    POTASSIUM CHLORIDE SA (K-DUR,KLOR-CON) 20 MEQ TABLET    Take 20 mEq by mouth 2 (two) times daily.    POVIDONE-IODINE (BETADINE) 10 % EXTERNAL SOLUTION    Apply topically as needed for Wound Care.    SANTYL OINTMENT    Apply topically once daily. unknown    TAMSULOSIN (FLOMAX) 0.4 MG CAP    Take 0.4 mg by mouth once daily.    TRUE METRIX GLUCOSE METER MISC        ZINC SULFATE (ZINCATE) 50 MG ZINC (220 MG) CAPSULE    Take 220 mg by mouth every morning.   Modified Medications    No medications on file   Discontinued Medications    No medications on file        Kodak Soriano MD  Cardiovascular Disease [facility]

## 2024-09-12 ENCOUNTER — OFFICE VISIT (OUTPATIENT)
Dept: CARDIOLOGY | Facility: CLINIC | Age: 74
End: 2024-09-12
Payer: MEDICARE

## 2024-09-12 VITALS
WEIGHT: 213.88 LBS | OXYGEN SATURATION: 99 % | HEIGHT: 69 IN | BODY MASS INDEX: 31.68 KG/M2 | HEART RATE: 78 BPM | SYSTOLIC BLOOD PRESSURE: 110 MMHG | DIASTOLIC BLOOD PRESSURE: 69 MMHG

## 2024-09-12 DIAGNOSIS — T07.XXXA WOUNDS, MULTIPLE: ICD-10-CM

## 2024-09-12 DIAGNOSIS — I20.89 OTHER FORMS OF ANGINA PECTORIS: ICD-10-CM

## 2024-09-12 DIAGNOSIS — I70.234 ATHEROSCLEROSIS OF NATIVE ARTERY OF BOTH LOWER EXTREMITIES WITH BILATERAL ULCERATION OF HEELS: Primary | ICD-10-CM

## 2024-09-12 DIAGNOSIS — I70.244 ATHEROSCLEROSIS OF NATIVE ARTERY OF BOTH LOWER EXTREMITIES WITH BILATERAL ULCERATION OF HEELS: Primary | ICD-10-CM

## 2024-09-12 DIAGNOSIS — G93.41 ACUTE METABOLIC ENCEPHALOPATHY: ICD-10-CM

## 2024-09-12 PROCEDURE — 99999 PR PBB SHADOW E&M-EST. PATIENT-LVL V: CPT | Mod: PBBFAC,HCNC,, | Performed by: STUDENT IN AN ORGANIZED HEALTH CARE EDUCATION/TRAINING PROGRAM

## 2024-09-13 DIAGNOSIS — I49.8 OTHER CARDIAC ARRHYTHMIA: Primary | ICD-10-CM

## 2024-09-17 ENCOUNTER — TELEPHONE (OUTPATIENT)
Dept: ELECTROPHYSIOLOGY | Facility: CLINIC | Age: 74
End: 2024-09-17
Payer: MEDICARE

## 2024-09-17 NOTE — TELEPHONE ENCOUNTER
Received message from Fanny @ [facility]. Tried to return phone call. Phone just rings. Talked to pt who is ok with cy that has been schedule. There is not an appointment sooner than pt currently cy.

## 2024-09-29 ENCOUNTER — HOSPITAL ENCOUNTER (INPATIENT)
Facility: HOSPITAL | Age: 74
LOS: 9 days | Discharge: NURSING, REHAB OR LONG-TERM CARE | DRG: 628 | End: 2024-10-09
Attending: EMERGENCY MEDICINE | Admitting: STUDENT IN AN ORGANIZED HEALTH CARE EDUCATION/TRAINING PROGRAM
Payer: MEDICARE

## 2024-09-29 DIAGNOSIS — L89.304 PRESSURE INJURY OF BUTTOCK, STAGE 4, UNSPECIFIED LATERALITY: ICD-10-CM

## 2024-09-29 DIAGNOSIS — D53.9 MACROCYTIC ANEMIA: ICD-10-CM

## 2024-09-29 DIAGNOSIS — S91.301D OPEN WOUND OF RIGHT FOOT WITH COMPLICATION, SUBSEQUENT ENCOUNTER: ICD-10-CM

## 2024-09-29 DIAGNOSIS — E88.09 HYPOALBUMINEMIA: Primary | ICD-10-CM

## 2024-09-29 DIAGNOSIS — Q24.6 CONGENITAL HEART BLOCK: ICD-10-CM

## 2024-09-29 DIAGNOSIS — D75.839 THROMBOCYTOSIS: ICD-10-CM

## 2024-09-29 DIAGNOSIS — T14.8XXA WOUND INFECTION: ICD-10-CM

## 2024-09-29 DIAGNOSIS — L89.614 PRESSURE INJURY OF RIGHT HEEL, STAGE 4: ICD-10-CM

## 2024-09-29 DIAGNOSIS — M86.9 OSTEOMYELITIS OF RIGHT FOOT, UNSPECIFIED TYPE: ICD-10-CM

## 2024-09-29 DIAGNOSIS — R53.2 FUNCTIONAL QUADRIPLEGIA: ICD-10-CM

## 2024-09-29 DIAGNOSIS — L08.9 WOUND INFECTION: ICD-10-CM

## 2024-09-29 DIAGNOSIS — L89.154 PRESSURE INJURY OF SACRAL REGION, STAGE 4: ICD-10-CM

## 2024-09-29 DIAGNOSIS — R07.9 CHEST PAIN: ICD-10-CM

## 2024-09-29 DIAGNOSIS — M86.9 OSTEOMYELITIS: ICD-10-CM

## 2024-09-29 PROBLEM — D64.9 ANEMIA: Status: ACTIVE | Noted: 2024-09-29

## 2024-09-29 PROBLEM — Z97.8 CHRONIC INDWELLING FOLEY CATHETER: Status: ACTIVE | Noted: 2024-09-29

## 2024-09-29 LAB
ABO + RH BLD: NORMAL
ALBUMIN SERPL BCP-MCNC: 1.3 G/DL (ref 3.5–5.2)
ALLENS TEST: NORMAL
ALP SERPL-CCNC: 86 U/L (ref 55–135)
ALT SERPL W/O P-5'-P-CCNC: 33 U/L (ref 10–44)
ANION GAP SERPL CALC-SCNC: 8 MMOL/L (ref 8–16)
AST SERPL-CCNC: 35 U/L (ref 10–40)
BACTERIA #/AREA URNS AUTO: ABNORMAL /HPF
BASOPHILS # BLD AUTO: 0.02 K/UL (ref 0–0.2)
BASOPHILS NFR BLD: 0.1 % (ref 0–1.9)
BILIRUB SERPL-MCNC: 0.1 MG/DL (ref 0.1–1)
BILIRUB UR QL STRIP: NEGATIVE
BLD GP AB SCN CELLS X3 SERPL QL: NORMAL
BLD PROD TYP BPU: NORMAL
BLOOD UNIT EXPIRATION DATE: NORMAL
BLOOD UNIT TYPE CODE: 600
BLOOD UNIT TYPE: NORMAL
BUN SERPL-MCNC: 31 MG/DL (ref 8–23)
CALCIUM SERPL-MCNC: 8.9 MG/DL (ref 8.7–10.5)
CHLORIDE SERPL-SCNC: 109 MMOL/L (ref 95–110)
CLARITY UR REFRACT.AUTO: CLEAR
CO2 SERPL-SCNC: 21 MMOL/L (ref 23–29)
CODING SYSTEM: NORMAL
COLOR UR AUTO: COLORLESS
CREAT SERPL-MCNC: 1.1 MG/DL (ref 0.5–1.4)
CROSSMATCH INTERPRETATION: NORMAL
DELSYS: NORMAL
DIFFERENTIAL METHOD BLD: ABNORMAL
DISPENSE STATUS: NORMAL
EOSINOPHIL # BLD AUTO: 0.6 K/UL (ref 0–0.5)
EOSINOPHIL NFR BLD: 4.7 % (ref 0–8)
ERYTHROCYTE [DISTWIDTH] IN BLOOD BY AUTOMATED COUNT: 15.9 % (ref 11.5–14.5)
EST. GFR  (NO RACE VARIABLE): >60 ML/MIN/1.73 M^2
GLUCOSE SERPL-MCNC: 156 MG/DL (ref 70–110)
GLUCOSE UR QL STRIP: NEGATIVE
HCT VFR BLD AUTO: 21.3 % (ref 40–54)
HCV AB SERPL QL IA: NORMAL
HGB BLD-MCNC: 6.3 G/DL (ref 14–18)
HGB UR QL STRIP: ABNORMAL
HIV 1+2 AB+HIV1 P24 AG SERPL QL IA: NORMAL
IMM GRANULOCYTES # BLD AUTO: 0.08 K/UL (ref 0–0.04)
IMM GRANULOCYTES NFR BLD AUTO: 0.6 % (ref 0–0.5)
INR PPP: 1.2 (ref 0.8–1.2)
KETONES UR QL STRIP: NEGATIVE
LDH SERPL L TO P-CCNC: 1.26 MMOL/L (ref 0.5–2.2)
LEUKOCYTE ESTERASE UR QL STRIP: ABNORMAL
LYMPHOCYTES # BLD AUTO: 1.1 K/UL (ref 1–4.8)
LYMPHOCYTES NFR BLD: 8.1 % (ref 18–48)
MAGNESIUM SERPL-MCNC: 1.7 MG/DL (ref 1.6–2.6)
MCH RBC QN AUTO: 24.1 PG (ref 27–31)
MCHC RBC AUTO-ENTMCNC: 29.6 G/DL (ref 32–36)
MCV RBC AUTO: 82 FL (ref 82–98)
MICROSCOPIC COMMENT: ABNORMAL
MONOCYTES # BLD AUTO: 0.5 K/UL (ref 0.3–1)
MONOCYTES NFR BLD: 3.8 % (ref 4–15)
NEUTROPHILS # BLD AUTO: 11.3 K/UL (ref 1.8–7.7)
NEUTROPHILS NFR BLD: 82.7 % (ref 38–73)
NITRITE UR QL STRIP: NEGATIVE
NRBC BLD-RTO: 0 /100 WBC
PH UR STRIP: 6 [PH] (ref 5–8)
PHOSPHATE SERPL-MCNC: 3.3 MG/DL (ref 2.7–4.5)
PLATELET # BLD AUTO: 690 K/UL (ref 150–450)
PLATELET BLD QL SMEAR: ABNORMAL
PMV BLD AUTO: ABNORMAL FL (ref 9.2–12.9)
POCT GLUCOSE: 194 MG/DL (ref 70–110)
POTASSIUM SERPL-SCNC: 5 MMOL/L (ref 3.5–5.1)
PROCALCITONIN SERPL IA-MCNC: 0.12 NG/ML
PROT SERPL-MCNC: 7 G/DL (ref 6–8.4)
PROT UR QL STRIP: NEGATIVE
PROTHROMBIN TIME: 12.7 SEC (ref 9–12.5)
RBC # BLD AUTO: 2.61 M/UL (ref 4.6–6.2)
RBC #/AREA URNS AUTO: 15 /HPF (ref 0–4)
SAMPLE: NORMAL
SITE: NORMAL
SODIUM SERPL-SCNC: 138 MMOL/L (ref 136–145)
SP GR UR STRIP: 1.01 (ref 1–1.03)
SPECIMEN OUTDATE: NORMAL
SQUAMOUS #/AREA URNS AUTO: 1 /HPF
TRANS ERYTHROCYTES VOL PATIENT: NORMAL ML
URN SPEC COLLECT METH UR: ABNORMAL
WBC # BLD AUTO: 13.66 K/UL (ref 3.9–12.7)
WBC #/AREA URNS AUTO: >100 /HPF (ref 0–5)

## 2024-09-29 PROCEDURE — 84100 ASSAY OF PHOSPHORUS: CPT | Mod: HCNC | Performed by: EMERGENCY MEDICINE

## 2024-09-29 PROCEDURE — G0378 HOSPITAL OBSERVATION PER HR: HCPCS | Mod: HCNC

## 2024-09-29 PROCEDURE — 85610 PROTHROMBIN TIME: CPT | Mod: HCNC | Performed by: EMERGENCY MEDICINE

## 2024-09-29 PROCEDURE — 80053 COMPREHEN METABOLIC PANEL: CPT | Mod: HCNC | Performed by: EMERGENCY MEDICINE

## 2024-09-29 PROCEDURE — 81001 URINALYSIS AUTO W/SCOPE: CPT | Mod: HCNC | Performed by: EMERGENCY MEDICINE

## 2024-09-29 PROCEDURE — 25000003 PHARM REV CODE 250: Mod: HCNC | Performed by: EMERGENCY MEDICINE

## 2024-09-29 PROCEDURE — 87086 URINE CULTURE/COLONY COUNT: CPT | Mod: HCNC | Performed by: EMERGENCY MEDICINE

## 2024-09-29 PROCEDURE — P9021 RED BLOOD CELLS UNIT: HCPCS | Mod: HCNC | Performed by: EMERGENCY MEDICINE

## 2024-09-29 PROCEDURE — 86901 BLOOD TYPING SEROLOGIC RH(D): CPT | Mod: HCNC | Performed by: EMERGENCY MEDICINE

## 2024-09-29 PROCEDURE — 51702 INSERT TEMP BLADDER CATH: CPT | Mod: HCNC

## 2024-09-29 PROCEDURE — 93005 ELECTROCARDIOGRAM TRACING: CPT | Mod: HCNC

## 2024-09-29 PROCEDURE — 83735 ASSAY OF MAGNESIUM: CPT | Mod: HCNC | Performed by: EMERGENCY MEDICINE

## 2024-09-29 PROCEDURE — 36430 TRANSFUSION BLD/BLD COMPNT: CPT | Mod: HCNC

## 2024-09-29 PROCEDURE — 84145 PROCALCITONIN (PCT): CPT | Mod: HCNC | Performed by: EMERGENCY MEDICINE

## 2024-09-29 PROCEDURE — 63600175 PHARM REV CODE 636 W HCPCS: Mod: HCNC

## 2024-09-29 PROCEDURE — 25000003 PHARM REV CODE 250: Mod: HCNC

## 2024-09-29 PROCEDURE — 87186 SC STD MICRODIL/AGAR DIL: CPT | Mod: HCNC | Performed by: EMERGENCY MEDICINE

## 2024-09-29 PROCEDURE — 86850 RBC ANTIBODY SCREEN: CPT | Mod: HCNC | Performed by: EMERGENCY MEDICINE

## 2024-09-29 PROCEDURE — 87070 CULTURE OTHR SPECIMN AEROBIC: CPT | Mod: HCNC | Performed by: EMERGENCY MEDICINE

## 2024-09-29 PROCEDURE — 93010 ELECTROCARDIOGRAM REPORT: CPT | Mod: HCNC,,, | Performed by: INTERNAL MEDICINE

## 2024-09-29 PROCEDURE — 86920 COMPATIBILITY TEST SPIN: CPT | Mod: HCNC | Performed by: EMERGENCY MEDICINE

## 2024-09-29 PROCEDURE — 87040 BLOOD CULTURE FOR BACTERIA: CPT | Mod: 59,HCNC | Performed by: EMERGENCY MEDICINE

## 2024-09-29 PROCEDURE — 85025 COMPLETE CBC W/AUTO DIFF WBC: CPT | Mod: HCNC | Performed by: EMERGENCY MEDICINE

## 2024-09-29 PROCEDURE — 83605 ASSAY OF LACTIC ACID: CPT | Mod: HCNC

## 2024-09-29 PROCEDURE — 96372 THER/PROPH/DIAG INJ SC/IM: CPT

## 2024-09-29 PROCEDURE — 87389 HIV-1 AG W/HIV-1&-2 AB AG IA: CPT | Mod: HCNC | Performed by: PHYSICIAN ASSISTANT

## 2024-09-29 PROCEDURE — 99900035 HC TECH TIME PER 15 MIN (STAT): Mod: HCNC

## 2024-09-29 PROCEDURE — 63600175 PHARM REV CODE 636 W HCPCS: Mod: HCNC | Performed by: EMERGENCY MEDICINE

## 2024-09-29 PROCEDURE — 86803 HEPATITIS C AB TEST: CPT | Mod: HCNC | Performed by: PHYSICIAN ASSISTANT

## 2024-09-29 PROCEDURE — 99285 EMERGENCY DEPT VISIT HI MDM: CPT | Mod: 25,HCNC

## 2024-09-29 PROCEDURE — 86900 BLOOD TYPING SEROLOGIC ABO: CPT | Mod: HCNC | Performed by: EMERGENCY MEDICINE

## 2024-09-29 RX ORDER — GLUCAGON 1 MG
1 KIT INJECTION
Status: DISCONTINUED | OUTPATIENT
Start: 2024-09-29 | End: 2024-10-09 | Stop reason: HOSPADM

## 2024-09-29 RX ORDER — IBUPROFEN 200 MG
16 TABLET ORAL
Status: DISCONTINUED | OUTPATIENT
Start: 2024-09-29 | End: 2024-10-09 | Stop reason: HOSPADM

## 2024-09-29 RX ORDER — IBUPROFEN 200 MG
24 TABLET ORAL
Status: DISCONTINUED | OUTPATIENT
Start: 2024-09-29 | End: 2024-10-09 | Stop reason: HOSPADM

## 2024-09-29 RX ORDER — ERGOCALCIFEROL 1.25 MG/1
50000 CAPSULE ORAL
Status: DISCONTINUED | OUTPATIENT
Start: 2024-10-04 | End: 2024-10-09 | Stop reason: HOSPADM

## 2024-09-29 RX ORDER — OXYCODONE HYDROCHLORIDE 5 MG/1
5 TABLET ORAL
Status: DISCONTINUED | OUTPATIENT
Start: 2024-09-29 | End: 2024-09-30

## 2024-09-29 RX ORDER — ACETAMINOPHEN 325 MG/1
650 TABLET ORAL EVERY 6 HOURS PRN
Status: DISCONTINUED | OUTPATIENT
Start: 2024-09-29 | End: 2024-10-09 | Stop reason: HOSPADM

## 2024-09-29 RX ORDER — INSULIN ASPART 100 [IU]/ML
0-5 INJECTION, SOLUTION INTRAVENOUS; SUBCUTANEOUS
Status: DISCONTINUED | OUTPATIENT
Start: 2024-09-29 | End: 2024-10-09 | Stop reason: HOSPADM

## 2024-09-29 RX ORDER — GABAPENTIN 300 MG/1
300 CAPSULE ORAL 2 TIMES DAILY
Status: DISCONTINUED | OUTPATIENT
Start: 2024-09-29 | End: 2024-10-09 | Stop reason: HOSPADM

## 2024-09-29 RX ORDER — HYDROCODONE BITARTRATE AND ACETAMINOPHEN 500; 5 MG/1; MG/1
TABLET ORAL
Status: DISCONTINUED | OUTPATIENT
Start: 2024-09-29 | End: 2024-09-30

## 2024-09-29 RX ORDER — SODIUM CHLORIDE, SODIUM LACTATE, POTASSIUM CHLORIDE, CALCIUM CHLORIDE 600; 310; 30; 20 MG/100ML; MG/100ML; MG/100ML; MG/100ML
INJECTION, SOLUTION INTRAVENOUS CONTINUOUS
Status: ACTIVE | OUTPATIENT
Start: 2024-09-29 | End: 2024-09-30

## 2024-09-29 RX ORDER — TAMSULOSIN HYDROCHLORIDE 0.4 MG/1
0.4 CAPSULE ORAL DAILY
Status: DISCONTINUED | OUTPATIENT
Start: 2024-09-30 | End: 2024-10-09 | Stop reason: HOSPADM

## 2024-09-29 RX ORDER — OXYCODONE HYDROCHLORIDE 5 MG/1
5 TABLET ORAL
Status: DISCONTINUED | OUTPATIENT
Start: 2024-09-29 | End: 2024-09-29

## 2024-09-29 RX ORDER — OXYCODONE HYDROCHLORIDE 5 MG/1
5 TABLET ORAL
Status: COMPLETED | OUTPATIENT
Start: 2024-09-29 | End: 2024-09-29

## 2024-09-29 RX ORDER — NALOXONE HCL 0.4 MG/ML
0.02 VIAL (ML) INJECTION
Status: DISCONTINUED | OUTPATIENT
Start: 2024-09-29 | End: 2024-10-09 | Stop reason: HOSPADM

## 2024-09-29 RX ORDER — INSULIN GLARGINE 100 [IU]/ML
6 INJECTION, SOLUTION SUBCUTANEOUS 2 TIMES DAILY
Status: DISCONTINUED | OUTPATIENT
Start: 2024-09-29 | End: 2024-10-04

## 2024-09-29 RX ORDER — ASCORBIC ACID 500 MG
500 TABLET ORAL 2 TIMES DAILY
Status: DISCONTINUED | OUTPATIENT
Start: 2024-09-29 | End: 2024-10-09 | Stop reason: HOSPADM

## 2024-09-29 RX ORDER — SODIUM CHLORIDE 0.9 % (FLUSH) 0.9 %
10 SYRINGE (ML) INJECTION EVERY 12 HOURS PRN
Status: DISCONTINUED | OUTPATIENT
Start: 2024-09-29 | End: 2024-10-09 | Stop reason: HOSPADM

## 2024-09-29 RX ORDER — OXYCODONE HYDROCHLORIDE 5 MG/1
10 TABLET ORAL EVERY 6 HOURS PRN
Status: DISCONTINUED | OUTPATIENT
Start: 2024-09-29 | End: 2024-10-09 | Stop reason: HOSPADM

## 2024-09-29 RX ORDER — DIPHENHYDRAMINE HCL 25 MG
25 CAPSULE ORAL EVERY 6 HOURS PRN
Status: DISCONTINUED | OUTPATIENT
Start: 2024-09-29 | End: 2024-10-03

## 2024-09-29 RX ORDER — HYDROMORPHONE HYDROCHLORIDE 1 MG/ML
0.2 INJECTION, SOLUTION INTRAMUSCULAR; INTRAVENOUS; SUBCUTANEOUS EVERY 6 HOURS PRN
Status: DISCONTINUED | OUTPATIENT
Start: 2024-09-29 | End: 2024-10-09

## 2024-09-29 RX ADMIN — VANCOMYCIN HYDROCHLORIDE 2500 MG: 1.25 INJECTION, POWDER, LYOPHILIZED, FOR SOLUTION INTRAVENOUS at 04:09

## 2024-09-29 RX ADMIN — OXYCODONE HYDROCHLORIDE AND ACETAMINOPHEN 500 MG: 500 TABLET ORAL at 09:09

## 2024-09-29 RX ADMIN — GABAPENTIN 300 MG: 300 CAPSULE ORAL at 09:09

## 2024-09-29 RX ADMIN — PIPERACILLIN SODIUM AND TAZOBACTAM SODIUM 4.5 G: 4; .5 INJECTION, POWDER, FOR SOLUTION INTRAVENOUS at 04:09

## 2024-09-29 RX ADMIN — SODIUM CHLORIDE, POTASSIUM CHLORIDE, SODIUM LACTATE AND CALCIUM CHLORIDE 2121 ML: 600; 310; 30; 20 INJECTION, SOLUTION INTRAVENOUS at 03:09

## 2024-09-29 RX ADMIN — PIPERACILLIN SODIUM AND TAZOBACTAM SODIUM 4.5 G: 4; .5 INJECTION, POWDER, FOR SOLUTION INTRAVENOUS at 09:09

## 2024-09-29 RX ADMIN — INSULIN GLARGINE 6 UNITS: 100 INJECTION, SOLUTION SUBCUTANEOUS at 09:09

## 2024-09-29 RX ADMIN — OXYCODONE 5 MG: 5 TABLET ORAL at 03:09

## 2024-09-29 RX ADMIN — SODIUM CHLORIDE, POTASSIUM CHLORIDE, SODIUM LACTATE AND CALCIUM CHLORIDE: 600; 310; 30; 20 INJECTION, SOLUTION INTRAVENOUS at 09:09

## 2024-09-29 RX ADMIN — OXYCODONE 5 MG: 5 TABLET ORAL at 05:09

## 2024-09-29 NOTE — ED PROVIDER NOTES
Encounter Date: 9/29/2024       History     Chief Complaint   Patient presents with    Wound Infection     Arrives via EMS from [place] in [facility]. Draining wound to buttocks, nonstop today with odor. Left AKA.      74-year-old man with comorbidities of hypertension, hyperlipidemia, type 2 diabetes, previous history of Gram-negative bacteremia as well as osteomyelitis and previous left AKA presents from the nursing facility where he currently resides by EMS to the ED for evaluation of new wound drainage noted from natalie sacral wounds.  The patient describes generalized, worsening pain without georgette fever or chills.  He denies any associated shortness of breath as well as any nausea or vomiting.    The history is provided by the patient and medical records. No  was used.     Review of patient's allergies indicates:  No Known Allergies  Past Medical History:   Diagnosis Date    Arthritis     legs    Bacteremia due to Gram-negative bacteria 3/23/2021    Diabetes mellitus     Diabetes mellitus, type 2     Hyperlipidemia     Hypertension     Osteomyelitis     Palliative care encounter 5/24/2023     Past Surgical History:   Procedure Laterality Date    ABOVE-KNEE AMPUTATION Left 3/27/2024    Procedure: AMPUTATION, ABOVE KNEE;  Surgeon: Adal Arellano MD;  Location: [address];  Service: Vascular;  Laterality: Left;    ANGIOGRAPHY OF LOWER EXTREMITY N/A 2/3/2021    Procedure: Angiogram Extremity Bilateral;  Surgeon: Ernst Chacko MD;  Location: [address];  Service: Peripheral Vascular;  Laterality: N/A;  7.4 mintues fluoroscopy time  816.15 mGy  170.17 Gycm2    AORTOGRAPHY WITH EXTREMITY RUNOFF Bilateral 2/3/2021    Procedure: AORTOGRAM, WITH EXTREMITY RUNOFF;  Surgeon: Ernst Chacko MD;  Location: [address];  Service: Peripheral Vascular;  Laterality: Bilateral;    DEBRIDEMENT OF FOOT Left 2/23/2021    Procedure: DEBRIDEMENT, LEFT HEEL;  Surgeon: Mayra PRUITT  DAYNA Schroeder;  Location: [facility] OR [address] [place] [address];  Service: Podiatry;  Laterality: Left;    ESOPHAGOGASTRODUODENOSCOPY N/A 6/28/2024    Procedure: EGD (ESOPHAGOGASTRODUODENOSCOPY);  Surgeon: Adal Chandra MD;  Location: [facility] ENDO;  Service: Gastroenterology;  Laterality: N/A;    FOOT AMPUTATION  October 2010    left high midfoot amputation    IMPLANTATION OF LEADLESS PACEMAKER N/A 10/12/2023    Procedure: [id number];  Surgeon: VANESSA Delatorre MD;  Location: [facility] EP LAB;  Service: Cardiology;  Laterality: N/A;  AVB, MICRA, EH, [place] [address]     Family History   Problem Relation Name Age of Onset    Diabetes Mother      Heart disease Mother      Stroke Sister      Cancer Brother      Diabetes Sister       Social History     Tobacco Use    Smoking status: Never    Smokeless tobacco: Never   Substance Use Topics    Alcohol use: No     Comment: occassional    Drug use: No     Review of Systems    Physical Exam     Initial Vitals [09/29/24 1342]   BP Pulse Resp Temp SpO2   99/71 66 18 99 °F (37.2 °C) 100 %      MAP       --         Physical Exam    Vitals reviewed.  Constitutional:   Chronically ill-appearing 74-year-old  man no acute distress noted   HENT:   Head: Normocephalic and atraumatic.   Mildly desiccated mucous membranes noted   Eyes: EOM are normal.   Neck: No tracheal deviation present.   Cardiovascular:  Normal rate.           Irregularly irregular, rate controlled   Pulmonary/Chest: No stridor. No respiratory distress.   Abdominal: Abdomen is soft. There is no abdominal tenderness.   Genitourinary: Rectum:      Guaiac stool: Dark stool, guaiac positive.   Guaiac stool: Dark stool, guaiac positive.   Musculoskeletal:      Comments: Left lower extremity is surgically absent consistent with left AKA; right foot exhibits postsurgical changes without georgette evidence of wound drainage/infection     Neurological: He is alert and oriented to person, place, and time.    Skin:   Sacrum and buttocks:  Multiple areas of stage 3-4 pressure ulcers noted to sacrum right upper erosion measuring 4x3x2 is noted to significant depth without purulent drainage; left upper sacrum reveals 2x3x2 irregular-edged wound with brown, foul-smelling purulent drainage    Psychiatric: He has a normal mood and affect. Thought content normal.         ED Course   Procedures  Labs Reviewed   CBC W/ AUTO DIFFERENTIAL - Abnormal       Result Value    WBC 13.66 (*)     RBC 2.61 (*)     Hemoglobin 6.3 (*)     Hematocrit 21.3 (*)     MCV 82      MCH 24.1 (*)     MCHC 29.6 (*)     RDW 15.9 (*)     Platelets 690 (*)     MPV SEE COMMENT      Immature Granulocytes 0.6 (*)     Gran # (ANC) 11.3 (*)     Immature Grans (Abs) 0.08 (*)     Lymph # 1.1      Mono # 0.5      Eos # 0.6 (*)     Baso # 0.02      nRBC 0      Gran % 82.7 (*)     Lymph % 8.1 (*)     Mono % 3.8 (*)     Eosinophil % 4.7      Basophil % 0.1      Platelet Estimate Clumped (*)     Differential Method Automated      Narrative:     Release to patient->Immediate   COMPREHENSIVE METABOLIC PANEL - Abnormal    Sodium 138      Potassium 5.0      Chloride 109      CO2 21 (*)     Glucose 156 (*)     BUN 31 (*)     Creatinine 1.1      Calcium 8.9      Total Protein 7.0      Albumin 1.3 (*)     Total Bilirubin 0.1      Alkaline Phosphatase 86      AST 35      ALT 33      eGFR >60.0      Anion Gap 8      Narrative:     Release to patient->Immediate   URINALYSIS, REFLEX TO URINE CULTURE - Abnormal    Specimen UA Urine, Catheterized      Color, UA Colorless (*)     Appearance, UA Clear      pH, UA 6.0      Specific Gravity, UA 1.010      Protein, UA Negative      Glucose, UA Negative      Ketones, UA Negative      Bilirubin (UA) Negative      Occult Blood UA 1+ (*)     Nitrite, UA Negative      Leukocytes, UA 3+ (*)     Narrative:     Specimen Source->Urine   PROTIME-INR - Abnormal    Prothrombin Time 12.7 (*)     INR 1.2      Narrative:     Release to  patient->Immediate   URINALYSIS MICROSCOPIC - Abnormal    RBC, UA 15 (*)     WBC, UA >100 (*)     Bacteria Few (*)     Squam Epithel, UA 1      Microscopic Comment SEE COMMENT      Narrative:     Specimen Source->Urine   CULTURE, BLOOD   CULTURE, BLOOD   CULTURE, AEROBIC  (SPECIFY SOURCE)   CULTURE, URINE   HIV 1 / 2 ANTIBODY    HIV 1/2 Ag/Ab Non-reactive      Narrative:     Release to patient->Immediate   HEPATITIS C ANTIBODY    Hepatitis C Ab Non-reactive      Narrative:     Release to patient->Immediate   MAGNESIUM    Magnesium 1.7      Narrative:     Release to patient->Immediate   PHOSPHORUS    Phosphorus 3.3      Narrative:     Release to patient->Immediate   PROCALCITONIN    Procalcitonin 0.12     TYPE & SCREEN    Group & Rh A POS      Indirect Faustino NEG      Specimen Outdate 10/02/2024 23:59     ISTAT LACTATE    POC Lactate 1.26      Sample VENOUS      Site Other      Allens Test N/A      DelSys Room Air     POCT GLUCOSE MONITORING CONTINUOUS   PREPARE RBC SOFT    UNIT NUMBER [id number]      Product Code Y1322A26      DISPENSE STATUS ISSUED      CODING SYSTEM VTJO470      Unit Blood Type Code 0600      Unit Blood Type A NEG      Unit Expiration [id number]      CROSSMATCH INTERPRETATION Compatible       EKG Readings: (Independently Interpreted)   Initial Reading: No STEMI. Rhythm: Atrial Fibrillation. Heart Rate: 87. Ectopy: No Ectopy. Conduction: Normal. Axis: Normal.       Imaging Results              X-Ray Chest AP Portable (Final result)  Result time 09/29/24 17:59:04      Final result by Milton Dove MD (09/29/24 17:59:04)                   Impression:      Course interstitial attenuation, similar to the prior exam.  Superimposed edema is a consideration.      Electronically signed by: Milton Dove MD  Date:    09/29/2024  Time:    17:59               Narrative:    EXAMINATION:  XR CHEST AP PORTABLE    CLINICAL HISTORY:  wound infection;    TECHNIQUE:  Single frontal view of the chest was  performed.    COMPARISON:  07/25/2024    FINDINGS:  The cardiomediastinal silhouette is not enlarged.  There is elevation of the right hemidiaphragm..  There is no pleural effusion.  The trachea is midline.  The lungs are symmetrically expanded bilaterally with coarse interstitial attenuation.  No large focal consolidation seen.  There is no pneumothorax.  The osseous structures are remarkable for degenerative change..                                       Medications   0.9%  NaCl infusion (for blood administration) (has no administration in time range)   sodium chloride 0.9% flush 10 mL (has no administration in time range)   naloxone 0.4 mg/mL injection 0.02 mg (has no administration in time range)   glucose chewable tablet 16 g (has no administration in time range)   glucose chewable tablet 24 g (has no administration in time range)   glucagon (human recombinant) injection 1 mg (has no administration in time range)   dextrose 10% bolus 125 mL 125 mL (has no administration in time range)   dextrose 10% bolus 250 mL 250 mL (has no administration in time range)   insulin aspart U-100 pen 0-5 Units (has no administration in time range)   insulin glargine U-100 (Lantus) pen 6 Units (has no administration in time range)   piperacillin-tazobactam (ZOSYN) 4.5 g in D5W 100 mL IVPB (MB+) (has no administration in time range)   vancomycin 1.75 g in 5 % dextrose 500 mL IVPB (1,750 mg Intravenous Trough Due As Scheduled Before Dose 10/1/24 0600)   acetaminophen tablet 650 mg (has no administration in time range)   oxyCODONE immediate release tablet 5 mg (has no administration in time range)   multivitamin tablet (has no administration in time range)   tamsulosin 24 hr capsule 0.4 mg (has no administration in time range)   diphenhydrAMINE capsule 25 mg (has no administration in time range)   gabapentin capsule 300 mg (has no administration in time range)   ergocalciferol capsule 50,000 Units (has no administration in time  range)   ascorbic acid (vitamin C) tablet 500 mg (has no administration in time range)   lactated ringers infusion (has no administration in time range)   piperacillin-tazobactam (ZOSYN) 4.5 g in D5W 100 mL IVPB (MB+) (0 g Intravenous Stopped 9/29/24 1635)   lactated ringers bolus 2,121 mL (0 mLs Intravenous Stopped 9/29/24 1738)   vancomycin (VANCOCIN) 2,500 mg in D5W 500 mL IVPB (0 mg Intravenous Stopped 9/29/24 1921)   oxyCODONE immediate release tablet 5 mg (5 mg Oral Given 9/29/24 1528)   oxyCODONE immediate release tablet 5 mg (5 mg Oral Given 9/29/24 1718)     Medical Decision Making  Differential diagnosis: Bacteremia, YNES, osteomyelitis, infected pressure ulcer    Amount and/or Complexity of Data Reviewed  Labs: ordered.  Radiology: ordered.    Risk  Prescription drug management.              Attending Attestation:             Attending ED Notes:   Emergency department evaluation today reveals a leukocytosis of 13.7 with an associated left shift.  Also, there is evidence of worsened anemia with a hemoglobin of 6.3 without history of bleeding or melena, and there is evidence of thrombocytosis with an elevated platelet level.  Metabolic profile reveals a mildly elevated BUN without evidence of solid organ dysfunction.  The serum lactic acid and calcitonin are notably within normal limits.  The serum albumin is notably significantly diminished.  Broad-spectrum IV antibiotics including Zosyn and vancomycin have been administered shortly after arrival due to concerns for sepsis/bacteremia.  Additionally, 30 cc/kilogram of lactated Ringer's have been administered in the ED without evidence of hypotension.  Patient peripheral perfusion is notably intact following fluid resuscitation.  No active bleeding identified on exam, however, stool is guaiac positive and hemoglobin is notably diminished 6.3.  Patient has been consented for transfusion of packed red blood cells, and has been ordered at the time of  disposition.  He will be placed in observation with Hospital Medicine in fair condition for further therapy and management.                             Clinical Impression:  Final diagnoses:  [T14.8XXA, L08.9] Wound infection  [E88.09] Hypoalbuminemia (Primary)  [R53.2] Functional quadriplegia  [L89.154] Pressure injury of sacral region, stage 4 - Present on admission  [D53.9] Macrocytic anemia  [D75.839] Thrombocytosis          ED Disposition Condition    Observation Stable                Adal Rahman MD  09/29/24 1958

## 2024-09-29 NOTE — MEDICAL/APP STUDENT
[facility] - Emergency Dept  History & Physical    Subjective:      Chief Complaint/Reason for Admission: L hip wound     Saji Castañeda is a 74 y.o. 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 Arellano, Chronic Multifactorial Anemia, BPH s/p Chronic Sams, paroxysmal A-fib on eliquis, CHB s/p PPM medtronic (10/12/2023), CKD3, Multifactorial HTN, HLD and Osteomyelitis of R foot presenting from [facility] with suspected infection of L hip wound. He is nonambulatory . Patient has chronic, extensive b/t sacral wounds, a R heel wound and a wound on the L hip with purulence and a foul odor. The patient was recommended to come to the ED due to concerns of wound infection by nursing staff at NH. Patient reports intermittent pain at the wound sites that is controlled with Oxycodone. Patient states that wound care manages these sites at the nursing home. Patient is alert, oriented and responsive to questioning however he is a poor historian regarding his medical history. Per Chart Review the patient was admitted to the ED on 7/25 for AMS while on Vanc and Cefepime for Osteomyelitis. Cefepime neurotoxicity was suspected and therapy was switched to Vanc and Zosyn. He was transferred to [facility] at that time and had a pacemaker placed. Patient reports currently being on oral antibiotic therapy but is unsure what he is taking. He reports taking his medications daily that the nurse at NH provides. He reports never smoking or using drugs and no longer drinks alcohol.     Patient reports chronic Sams cath usage for unknown reason that was last changed today. He reports an episode of dysuria yesterday and endorses suprapubic pain on palpation. He denies hematuria, urinary frequency/urgency, urinary odor or abdominal pain.     He also reports a mild cough and chills for the past several weeks with scant sputum production but denies fever,  chest pain, SOB or recent illness. He reports chronic diaphoresis at night that requires him to change clothes in the morning.    Past Medical History:   Diagnosis Date    Arthritis     legs    Bacteremia due to Gram-negative bacteria 3/23/2021    Diabetes mellitus     Diabetes mellitus, type 2     Hyperlipidemia     Hypertension     Osteomyelitis     Palliative care encounter 5/24/2023     Past Surgical History:   Procedure Laterality Date    ABOVE-KNEE AMPUTATION Left 3/27/2024    Procedure: AMPUTATION, ABOVE KNEE;  Surgeon: Adal Arellano MD;  Location: [facility] OR [address] [place] [address];  Service: Vascular;  Laterality: Left;    ANGIOGRAPHY OF LOWER EXTREMITY N/A 2/3/2021    Procedure: Angiogram Extremity Bilateral;  Surgeon: Ernst Chacko MD;  Location: [facility] OR [address] [place] [address];  Service: Peripheral Vascular;  Laterality: N/A;  7.4 mintues fluoroscopy time  816.15 mGy  170.17 Gycm2    AORTOGRAPHY WITH EXTREMITY RUNOFF Bilateral 2/3/2021    Procedure: AORTOGRAM, WITH EXTREMITY RUNOFF;  Surgeon: Ernst Chacko MD;  Location: [address];  Service: Peripheral Vascular;  Laterality: Bilateral;    DEBRIDEMENT OF FOOT Left 2/23/2021    Procedure: DEBRIDEMENT, LEFT HEEL;  Surgeon: Mayra Schroeder DPM;  Location: [facility] OR [address] [place] [address];  Service: Podiatry;  Laterality: Left;    ESOPHAGOGASTRODUODENOSCOPY N/A 6/28/2024    Procedure: EGD (ESOPHAGOGASTRODUODENOSCOPY);  Surgeon: Adal Chandra MD;  Location: [facility];  Service: Gastroenterology;  Laterality: N/A;    FOOT AMPUTATION  October 2010    left high midfoot amputation    IMPLANTATION OF LEADLESS PACEMAKER N/A 10/12/2023    Procedure: [id number];  Surgeon: VANESSA Delatorre MD;  Location: [facility] EP LAB;  Service: Cardiology;  Laterality: N/A;  AVB, MICRA, EH, [place], [place] [address]     Social History     Tobacco Use    Smoking status: Never    Smokeless tobacco: Never   Substance Use Topics    Alcohol use: No     Comment: occassional     Drug use: No       (Not in a hospital admission)    Review of patient's allergies indicates:  No Known Allergies     Review of Systems   Constitutional:  Positive for chills and diaphoresis. Negative for fever.   HENT:  Negative for tinnitus.    Eyes:  Negative for photophobia, pain and redness.   Respiratory:  Positive for cough. Negative for sputum production, shortness of breath, wheezing and stridor.    Cardiovascular:  Negative for chest pain, palpitations and leg swelling.   Gastrointestinal:  Negative for abdominal pain, blood in stool, constipation, nausea and vomiting.   Genitourinary:  Positive for dysuria. Negative for frequency, hematuria and urgency.   Musculoskeletal:  Negative for joint pain and myalgias.   Skin:  Negative for itching and rash.   Neurological:  Negative for dizziness and headaches.   Endo/Heme/Allergies:  Negative for polydipsia. Does not bruise/bleed easily.   Psychiatric/Behavioral:  Negative for depression and hallucinations.        Objective:      Vital Signs (Most Recent)  Temp: 98.7 °F (37.1 °C) (09/29/24 1700)  Pulse: 79 (09/29/24 1830)  Resp: 18 (09/29/24 1830)  BP: (!) 113/55 (09/29/24 1830)  SpO2: 99 % (09/29/24 1830)    Vital Signs Range (Last 24H):  Temp:  [98.7 °F (37.1 °C)-99 °F (37.2 °C)]   Pulse:  []   Resp:  [13-21]   BP: ()/(50-71)   SpO2:  [95 %-100 %]     Physical Exam  Constitutional:       General: He is not in acute distress.     Appearance: Normal appearance. He is normal weight. He is not ill-appearing or toxic-appearing.   HENT:      Head: Normocephalic and atraumatic.      Nose: Nose normal. No congestion.   Eyes:      General: No scleral icterus.     Pupils: Pupils are equal, round, and reactive to light.   Cardiovascular:      Rate and Rhythm: Normal rate. Rhythm irregular.      Pulses: Normal pulses.      Heart sounds: No murmur heard.     No gallop.   Pulmonary:      Effort: Pulmonary effort is normal. No respiratory distress.      Breath  sounds: No stridor. No wheezing, rhonchi or rales.   Abdominal:      General: Abdomen is flat. There is no distension.      Palpations: Abdomen is soft.      Tenderness: There is abdominal tenderness. There is no guarding or rebound.      Comments: Suprapubic and R upper pain upon palpation   Musculoskeletal:      Cervical back: Normal range of motion. No rigidity or tenderness.      Right lower leg: No edema.      Left lower leg: No edema.   Skin:     General: Skin is warm and dry.      Coloration: Skin is not jaundiced.      Findings: No rash.      Comments: Extensive sacral wounds, L hip wound with purulence    Neurological:      General: No focal deficit present.      Mental Status: He is alert. Mental status is at baseline.   Psychiatric:         Mood and Affect: Mood normal.         Behavior: Behavior normal.         Thought Content: Thought content normal.         Judgment: Judgment normal.         Data Review:    ECG:     Assessment:      There are no hospital problems to display for this patient.      Plan:        Chronic Osteomyelitis of R heel  MRSA history  History of Carbapenem resistant Pseudomonas   - Recent history of IV Vanc and Zoysn 7/25  - extensive grade 3-4 sacral wounds, L hip wound purulence  - chronic osteomyelitis of R heel with nonhealing ulcer    - wound and blood cultures pending   - started on IV Vanc in the ED, recommend renally dosing  - continue Vanc 1750 mg q12h initiating Zosyn therapy, do not recommend Cefepime due to hx of neurotoxicity    - consulted wound care, appreciate recommendations  - WBC elevated 13.6, likely due to wound infection  - CXR pending   - trend CBC and CMP  - procalcitonin 0.12  - continue oxycodone and tylenol PRN for pain  - continue IV fluids   - per pharmacy, obtain vanc trough on 10/01 at 6:00 before giving 4th dose, range 10-20 mcg/mL    Paroxysmal Atrial Fibrillation on Eliquis   Pacemaker   PVD  CHF  - echo on 7/2024 showed EF of 55-60% with no  significant valvular abnormalities   - follows with Vascular Surgeon Dr. Banda  - hold home Lasix and HCTZ, not volume overloaded   - electrolytes stable, trend CMP  - hold Eliquis due to unstable H&H, trend CBC  - monitor Afib, continuous telemetry     Anemia of Chronic Disease   Acute Anemia   - H&H 6.3 and 21.6   - transfused 1 unit of PRBCs in ED  - hold iron supplement   - guaic + stool test    Below Knee Amputation 3/24  - due to nonhealing L foot wound   - well-healed scar   - patient is non-ambulatory     Chronic Sams  Suspected Urinary Tract Infection  - UA shows +1 blood, 3+ leuk esterase, >100 WBCs and few bacteria, patient reports episode of dysuria yesterday and suprapubic pain on palpation   - culture pending   - last Sams change today       Type 2 Diabetes Mellitus on Insulin   - Hb A1c on 7/25 was 6  - continue insulin sliding scale     CKD3  - hold nephrotoxic medications  - hold Lasix at this time   - renally dose Vanc    HTN  - normotensive at this time  - hold diuretics, no signs of volume overload     Hypoalbuminemia   - albumin 1.3

## 2024-09-29 NOTE — SUBJECTIVE & OBJECTIVE
Past Medical History:   Diagnosis Date    Arthritis     legs    Bacteremia due to Gram-negative bacteria 3/23/2021    Diabetes mellitus     Diabetes mellitus, type 2     Hyperlipidemia     Hypertension     Osteomyelitis     Palliative care encounter 5/24/2023       Past Surgical History:   Procedure Laterality Date    ABOVE-KNEE AMPUTATION Left 3/27/2024    Procedure: AMPUTATION, ABOVE KNEE;  Surgeon: Adal Arellano MD;  Location: [address];  Service: Vascular;  Laterality: Left;    ANGIOGRAPHY OF LOWER EXTREMITY N/A 2/3/2021    Procedure: Angiogram Extremity Bilateral;  Surgeon: Ernst Chacko MD;  Location: [address];  Service: Peripheral Vascular;  Laterality: N/A;  7.4 mintues fluoroscopy time  816.15 mGy  170.17 Gycm2    AORTOGRAPHY WITH EXTREMITY RUNOFF Bilateral 2/3/2021    Procedure: AORTOGRAM, WITH EXTREMITY RUNOFF;  Surgeon: Ernst Chacko MD;  Location: [address];  Service: Peripheral Vascular;  Laterality: Bilateral;    DEBRIDEMENT OF FOOT Left 2/23/2021    Procedure: DEBRIDEMENT, LEFT HEEL;  Surgeon: Mayra Schroeder DPM;  Location: [address];  Service: Podiatry;  Laterality: Left;    ESOPHAGOGASTRODUODENOSCOPY N/A 6/28/2024    Procedure: EGD (ESOPHAGOGASTRODUODENOSCOPY);  Surgeon: Adal Chandra MD;  Location: [facility] ENDO;  Service: Gastroenterology;  Laterality: N/A;    FOOT AMPUTATION  October 2010    left high midfoot amputation    IMPLANTATION OF LEADLESS PACEMAKER N/A 10/12/2023    Procedure: [id number];  Surgeon: VANESSA Delatorre MD;  Location: [facility] EP LAB;  Service: Cardiology;  Laterality: N/A;  AVB, MICRA, EH, [place], [place] [id number]       Review of patient's allergies indicates:  No Known Allergies    No current facility-administered medications on file prior to encounter.     Current Outpatient Medications on File Prior to Encounter   Medication Sig    acetaminophen (TYLENOL) 325 MG tablet Take 650 mg by mouth every  "6 (six) hours as needed for Pain.    apixaban (ELIQUIS) 5 mg Tab Take 1 tablet (5 mg total) by mouth 2 (two) times daily.    ascorbic acid, vitamin C, (VITAMIN C) 500 MG tablet Take 500 mg by mouth 2 (two) times daily.    BD AUTOSHIELD DUO PEN NEEDLE 30 gauge x 3/16" Ndle     BD ULTRA-FINE ADITYA PEN NEEDLE 32 gauge x 5/32" Ndle USE FOUR TIMES DAILY WITH MEALS AND NIGHTLY    blood sugar diagnostic (CONTOUR TEST STRIPS) Strp 1 strip by Misc.(Non-Drug; Combo Route) route 4 (four) times daily. Use to check blood glucose 4x daily    diphenhydrAMINE (BENADRYL) 25 mg capsule Take 1 capsule (25 mg total) by mouth every 6 (six) hours as needed for Itching.    ergocalciferol (ERGOCALCIFEROL) 50,000 unit Cap Take 1 capsule (50,000 Units total) by mouth Every Friday. for 10 doses    ferrous sulfate 325 (65 FE) MG EC tablet Take 325 mg by mouth 2 (two) times daily.    furosemide (LASIX) 20 MG tablet Take 1 tablet (20 mg total) by mouth once daily.    gabapentin (NEURONTIN) 300 MG capsule Take 300 mg by mouth 2 (two) times daily.    insulin aspart U-100 (NOVOLOG) 100 unit/mL (3 mL) InPn pen Inject 5 Units into the skin 3 (three) times daily with meals.    insulin aspart U-100 (NOVOLOG) 100 unit/mL injection Inject 2-10 Units into the skin 3 (three) times daily before meals. 0-149=0  150-200: 2 units  201-250: 4 units  251-300: 6 units  301-350: 8 units  351-1000=10 units, NOTIFY MD    lancets (MICROLET LANCET) Misc 1 each by Misc.(Non-Drug; Combo Route) route 4 (four) times daily. Use to check blood sugars 4x daily    LANTUS SOLOSTAR U-100 INSULIN 100 unit/mL (3 mL) InPn pen Inject 12 Units into the skin 2 (two) times a day.    menthol-zinc oxide (CALMOSEPTINE) 0.44-20.6 % Oint Apply topically as needed.    multivitamin (THERAGRAN) per tablet Take 1 tablet by mouth once daily.    NIFEdipine (PROCARDIA-XL) 90 MG (OSM) 24 hr tablet Take 1 tablet (90 mg total) by mouth once daily.    oxyCODONE (ROXICODONE) 5 MG immediate release " tablet Take 1 tablet (5 mg total) by mouth every 24 hours as needed for Pain.    pantoprazole (PROTONIX) 40 MG tablet Take 40 mg by mouth once daily. Before breakfast    potassium chloride SA (K-DUR,KLOR-CON) 20 MEQ tablet Take 20 mEq by mouth 2 (two) times daily.    povidone-iodine (BETADINE) 10 % external solution Apply topically as needed for Wound Care.    SANTYL ointment Apply topically once daily. unknown    tamsulosin (FLOMAX) 0.4 mg Cap Take 0.4 mg by mouth once daily.    TRUE METRIX GLUCOSE METER Misc     zinc sulfate (ZINCATE) 50 mg zinc (220 mg) capsule Take 220 mg by mouth every morning.    [DISCONTINUED] hydroCHLOROthiazide (HYDRODIURIL) 25 MG tablet Take 0.5 tablets (12.5 mg total) by mouth every Mon, Wed, Fri. Beginning on 7/4/2022     Family History       Problem Relation (Age of Onset)    Cancer Brother    Diabetes Mother, Sister    Heart disease Mother    Stroke Sister          Tobacco Use    Smoking status: Never    Smokeless tobacco: Never   Substance and Sexual Activity    Alcohol use: No     Comment: occassional    Drug use: No    Sexual activity: Not Currently     Review of Systems   Constitutional:  Positive for appetite change (low) and chills.   HENT:  Negative for congestion.    Respiratory:  Positive for cough (with some mucous production). Negative for shortness of breath.    Cardiovascular:  Negative for chest pain and leg swelling.   Gastrointestinal:  Positive for abdominal pain. Negative for abdominal distention, constipation, diarrhea, nausea and vomiting.   Genitourinary:  Positive for dysuria. Negative for difficulty urinating, frequency, hematuria and urgency.   Skin:  Positive for wound.     Objective:     Vital Signs (Most Recent):  Temp: 98.7 °F (37.1 °C) (09/29/24 1700)  Pulse: 79 (09/29/24 1830)  Resp: 18 (09/29/24 1830)  BP: (!) 113/55 (09/29/24 1830)  SpO2: 99 % (09/29/24 1830) Vital Signs (24h Range):  Temp:  [98.7 °F (37.1 °C)-99 °F (37.2 °C)] 98.7 °F (37.1 °C)  Pulse:   [] 79  Resp:  [13-21] 18  SpO2:  [95 %-100 %] 99 %  BP: ()/(50-71) 113/55     Weight: 97.1 kg (214 lb)  Body mass index is 31.6 kg/m².     Physical Exam  Constitutional:       General: He is not in acute distress.     Appearance: He is ill-appearing. He is not diaphoretic.   HENT:      Head: Normocephalic and atraumatic.      Right Ear: External ear normal.      Left Ear: External ear normal.      Mouth/Throat:      Mouth: Mucous membranes are dry.   Eyes:      General:         Right eye: No discharge.         Left eye: No discharge.      Extraocular Movements: Extraocular movements intact.   Cardiovascular:      Rate and Rhythm: Normal rate. Rhythm irregular.      Heart sounds: No murmur heard.  Pulmonary:      Effort: Pulmonary effort is normal. No respiratory distress.      Breath sounds: No wheezing.   Abdominal:      General: Abdomen is flat. There is no distension.      Tenderness: There is abdominal tenderness (suprapubic tenderness, and RLQ tenderness).   Musculoskeletal:      Cervical back: Normal range of motion.      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 Surgical scar well-healed  Right lower extremity with cracked, dry skin as well as right heel wound  Multiple areas of stage 3-4 pressure ulcers noted to sacrum   Right upper erosion measuring 4x3x2 is noted to significant depth without purulent drainage  Left upper sacrum reveals 2x3x2 irregular-edged wound with brown, foul-smelling purulent drainage       Skin:     General: Skin is dry.   Neurological:      General: No focal deficit present.      Mental Status: He is alert.                Significant Labs: All pertinent labs within the past 24 hours have been reviewed.    Significant Imaging: I have reviewed all pertinent imaging results/findings within the past 24 hours.

## 2024-09-29 NOTE — HPI
Saji Castañeda is a 74 y.o. 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 Arellano, Chronic Multifactorial Anemia, BPH s/p Chronic Sams, paroxysmal A-fib on eliquis, CHB s/p PPM medtronic (10/12/2023), CKD3, Multifactorial HTN, HLD and Osteomyelitis of R foot presenting from [facility] with suspected infection of L hip wound. He is nonambulatory . Patient has chronic, extensive sacral wounds, a R heel wound and a wound on the L hip with purulence and a foul odor. The patient was recommended to come to the ED due to concerns of wound infection by nursing staff at NH. Patient reports intermittent pain at the wound sites that is controlled with Oxycodone. Patient states that wound care manages these sites at the nursing home. Patient is alert, oriented and responsive to questioning however he is a poor historian regarding his medical history. Per Chart Review the patient was admitted to the ED on 7/25 for AMS while on Vanc and Cefepime for Osteomyelitis. Cefepime neurotoxicity was suspected and therapy was switched to Vanc and Zosyn. He then developed  He was transferred to [facility] at that time and had a pacemaker placed. Patient reports currently being on oral antibiotic therapy but is unsure what he is taking. He reports taking his medications daily that the nurse at NH provides. He reports never smoking or using drugs and no longer drinks alcohol.      Patient reports chronic Sams cath usage for unknown reason that was last changed today. He reports an episode of dysuria yesterday and endorses suprapubic pain on palpation. He denies hematuria, urinary frequency/urgency, urinary odor or abdominal pain.      He also reports a mild cough and chills for the past several weeks with scant sputum production but denies fever, chest pain, SOB or recent illness. He reports chronic diaphoresis at night that requires him to change  clothes in the morning.

## 2024-09-30 PROBLEM — D75.839 THROMBOCYTOSIS: Status: ACTIVE | Noted: 2024-09-30

## 2024-09-30 PROBLEM — T07.XXXA WOUNDS, MULTIPLE: Chronic | Status: ACTIVE | Noted: 2024-06-26

## 2024-09-30 LAB
ALBUMIN SERPL BCP-MCNC: 1.2 G/DL (ref 3.5–5.2)
ALP SERPL-CCNC: 87 U/L (ref 55–135)
ALT SERPL W/O P-5'-P-CCNC: 27 U/L (ref 10–44)
ANION GAP SERPL CALC-SCNC: 7 MMOL/L (ref 8–16)
AST SERPL-CCNC: 19 U/L (ref 10–40)
BACTERIA UR CULT: NO GROWTH
BASOPHILS # BLD AUTO: 0.04 K/UL (ref 0–0.2)
BASOPHILS # BLD AUTO: 0.05 K/UL (ref 0–0.2)
BASOPHILS NFR BLD: 0.2 % (ref 0–1.9)
BASOPHILS NFR BLD: 0.3 % (ref 0–1.9)
BILIRUB SERPL-MCNC: 1.3 MG/DL (ref 0.1–1)
BUN SERPL-MCNC: 29 MG/DL (ref 8–23)
CALCIUM SERPL-MCNC: 8.2 MG/DL (ref 8.7–10.5)
CHLORIDE SERPL-SCNC: 106 MMOL/L (ref 95–110)
CO2 SERPL-SCNC: 24 MMOL/L (ref 23–29)
CREAT SERPL-MCNC: 0.9 MG/DL (ref 0.5–1.4)
CRP SERPL-MCNC: 114.6 MG/L (ref 0–8.2)
DIFFERENTIAL METHOD BLD: ABNORMAL
DIFFERENTIAL METHOD BLD: ABNORMAL
EOSINOPHIL # BLD AUTO: 0.6 K/UL (ref 0–0.5)
EOSINOPHIL # BLD AUTO: 0.9 K/UL (ref 0–0.5)
EOSINOPHIL NFR BLD: 3.7 % (ref 0–8)
EOSINOPHIL NFR BLD: 4.2 % (ref 0–8)
ERYTHROCYTE [DISTWIDTH] IN BLOOD BY AUTOMATED COUNT: 15.9 % (ref 11.5–14.5)
ERYTHROCYTE [DISTWIDTH] IN BLOOD BY AUTOMATED COUNT: 16.2 % (ref 11.5–14.5)
ERYTHROCYTE [SEDIMENTATION RATE] IN BLOOD BY PHOTOMETRIC METHOD: >120 MM/HR (ref 0–23)
EST. GFR  (NO RACE VARIABLE): >60 ML/MIN/1.73 M^2
FERRITIN SERPL-MCNC: 1600 NG/ML (ref 20–300)
GLUCOSE SERPL-MCNC: 140 MG/DL (ref 70–110)
HCT VFR BLD AUTO: 23.8 % (ref 40–54)
HCT VFR BLD AUTO: 24.1 % (ref 40–54)
HGB BLD-MCNC: 7.2 G/DL (ref 14–18)
HGB BLD-MCNC: 7.3 G/DL (ref 14–18)
IMM GRANULOCYTES # BLD AUTO: 0.09 K/UL (ref 0–0.04)
IMM GRANULOCYTES # BLD AUTO: 0.19 K/UL (ref 0–0.04)
IMM GRANULOCYTES NFR BLD AUTO: 0.6 % (ref 0–0.5)
IMM GRANULOCYTES NFR BLD AUTO: 0.8 % (ref 0–0.5)
IRON SERPL-MCNC: 81 UG/DL (ref 45–160)
LYMPHOCYTES # BLD AUTO: 0.9 K/UL (ref 1–4.8)
LYMPHOCYTES # BLD AUTO: 1 K/UL (ref 1–4.8)
LYMPHOCYTES NFR BLD: 4.3 % (ref 18–48)
LYMPHOCYTES NFR BLD: 6.6 % (ref 18–48)
MAGNESIUM SERPL-MCNC: 1.5 MG/DL (ref 1.6–2.6)
MCH RBC QN AUTO: 24.8 PG (ref 27–31)
MCH RBC QN AUTO: 25.3 PG (ref 27–31)
MCHC RBC AUTO-ENTMCNC: 29.9 G/DL (ref 32–36)
MCHC RBC AUTO-ENTMCNC: 30.7 G/DL (ref 32–36)
MCV RBC AUTO: 83 FL (ref 82–98)
MCV RBC AUTO: 83 FL (ref 82–98)
MONOCYTES # BLD AUTO: 0.6 K/UL (ref 0.3–1)
MONOCYTES # BLD AUTO: 0.6 K/UL (ref 0.3–1)
MONOCYTES NFR BLD: 2.8 % (ref 4–15)
MONOCYTES NFR BLD: 4.5 % (ref 4–15)
NEUTROPHILS # BLD AUTO: 11.8 K/UL (ref 1.8–7.7)
NEUTROPHILS # BLD AUTO: 20.5 K/UL (ref 1.8–7.7)
NEUTROPHILS NFR BLD: 83.8 % (ref 38–73)
NEUTROPHILS NFR BLD: 88.2 % (ref 38–73)
NRBC BLD-RTO: 0 /100 WBC
NRBC BLD-RTO: 0 /100 WBC
OHS QRS DURATION: 76 MS
OHS QTC CALCULATION: 423 MS
PHOSPHATE SERPL-MCNC: 3.3 MG/DL (ref 2.7–4.5)
PLATELET # BLD AUTO: 547 K/UL (ref 150–450)
PLATELET # BLD AUTO: 557 K/UL (ref 150–450)
PLATELET BLD QL SMEAR: ABNORMAL
PMV BLD AUTO: 8 FL (ref 9.2–12.9)
PMV BLD AUTO: 8.6 FL (ref 9.2–12.9)
POCT GLUCOSE: 158 MG/DL (ref 70–110)
POCT GLUCOSE: 174 MG/DL (ref 70–110)
POCT GLUCOSE: 181 MG/DL (ref 70–110)
POCT GLUCOSE: 229 MG/DL (ref 70–110)
POTASSIUM SERPL-SCNC: 4.5 MMOL/L (ref 3.5–5.1)
PROT SERPL-MCNC: 5.8 G/DL (ref 6–8.4)
RBC # BLD AUTO: 2.88 M/UL (ref 4.6–6.2)
RBC # BLD AUTO: 2.9 M/UL (ref 4.6–6.2)
SATURATED IRON: 74 % (ref 20–50)
SODIUM SERPL-SCNC: 137 MMOL/L (ref 136–145)
TOTAL IRON BINDING CAPACITY: 110 UG/DL (ref 250–450)
TRANSFERRIN SERPL-MCNC: 74 MG/DL (ref 200–375)
WBC # BLD AUTO: 14.14 K/UL (ref 3.9–12.7)
WBC # BLD AUTO: 23.22 K/UL (ref 3.9–12.7)

## 2024-09-30 PROCEDURE — 20600001 HC STEP DOWN PRIVATE ROOM: Mod: HCNC

## 2024-09-30 PROCEDURE — 84466 ASSAY OF TRANSFERRIN: CPT | Mod: HCNC

## 2024-09-30 PROCEDURE — 63600175 PHARM REV CODE 636 W HCPCS: Mod: HCNC

## 2024-09-30 PROCEDURE — 25000003 PHARM REV CODE 250: Mod: HCNC

## 2024-09-30 PROCEDURE — 27000207 HC ISOLATION: Mod: HCNC

## 2024-09-30 PROCEDURE — 86140 C-REACTIVE PROTEIN: CPT | Mod: HCNC

## 2024-09-30 PROCEDURE — 85025 COMPLETE CBC W/AUTO DIFF WBC: CPT | Mod: HCNC

## 2024-09-30 PROCEDURE — 36415 COLL VENOUS BLD VENIPUNCTURE: CPT | Mod: HCNC

## 2024-09-30 PROCEDURE — 25000003 PHARM REV CODE 250: Mod: HCNC | Performed by: STUDENT IN AN ORGANIZED HEALTH CARE EDUCATION/TRAINING PROGRAM

## 2024-09-30 PROCEDURE — 80053 COMPREHEN METABOLIC PANEL: CPT | Mod: HCNC

## 2024-09-30 PROCEDURE — 85652 RBC SED RATE AUTOMATED: CPT | Mod: HCNC | Performed by: STUDENT IN AN ORGANIZED HEALTH CARE EDUCATION/TRAINING PROGRAM

## 2024-09-30 PROCEDURE — 63600175 PHARM REV CODE 636 W HCPCS: Mod: HCNC | Performed by: STUDENT IN AN ORGANIZED HEALTH CARE EDUCATION/TRAINING PROGRAM

## 2024-09-30 PROCEDURE — 96372 THER/PROPH/DIAG INJ SC/IM: CPT

## 2024-09-30 PROCEDURE — 84100 ASSAY OF PHOSPHORUS: CPT | Mod: HCNC

## 2024-09-30 PROCEDURE — 83735 ASSAY OF MAGNESIUM: CPT | Mod: HCNC

## 2024-09-30 PROCEDURE — 99223 1ST HOSP IP/OBS HIGH 75: CPT | Mod: HCNC,,, | Performed by: PODIATRIST

## 2024-09-30 PROCEDURE — 36415 COLL VENOUS BLD VENIPUNCTURE: CPT | Mod: HCNC | Performed by: STUDENT IN AN ORGANIZED HEALTH CARE EDUCATION/TRAINING PROGRAM

## 2024-09-30 PROCEDURE — 82728 ASSAY OF FERRITIN: CPT | Mod: HCNC

## 2024-09-30 RX ORDER — MAGNESIUM SULFATE HEPTAHYDRATE 40 MG/ML
2 INJECTION, SOLUTION INTRAVENOUS ONCE
Status: COMPLETED | OUTPATIENT
Start: 2024-09-30 | End: 2024-09-30

## 2024-09-30 RX ORDER — OXYCODONE HYDROCHLORIDE 5 MG/1
5 TABLET ORAL EVERY 6 HOURS PRN
Status: DISCONTINUED | OUTPATIENT
Start: 2024-09-30 | End: 2024-10-09 | Stop reason: HOSPADM

## 2024-09-30 RX ORDER — ENOXAPARIN SODIUM 100 MG/ML
40 INJECTION SUBCUTANEOUS EVERY 24 HOURS
Status: DISCONTINUED | OUTPATIENT
Start: 2024-10-01 | End: 2024-10-05

## 2024-09-30 RX ADMIN — PIPERACILLIN SODIUM AND TAZOBACTAM SODIUM 4.5 G: 4; .5 INJECTION, POWDER, FOR SOLUTION INTRAVENOUS at 10:09

## 2024-09-30 RX ADMIN — OXYCODONE HYDROCHLORIDE 10 MG: 5 TABLET ORAL at 08:09

## 2024-09-30 RX ADMIN — INSULIN GLARGINE 6 UNITS: 100 INJECTION, SOLUTION SUBCUTANEOUS at 08:09

## 2024-09-30 RX ADMIN — MAGNESIUM SULFATE HEPTAHYDRATE 2 G: 40 INJECTION, SOLUTION INTRAVENOUS at 08:09

## 2024-09-30 RX ADMIN — OXYCODONE HYDROCHLORIDE AND ACETAMINOPHEN 500 MG: 500 TABLET ORAL at 08:09

## 2024-09-30 RX ADMIN — VANCOMYCIN HYDROCHLORIDE 1750 MG: 10 INJECTION, POWDER, LYOPHILIZED, FOR SOLUTION INTRAVENOUS at 06:09

## 2024-09-30 RX ADMIN — GABAPENTIN 300 MG: 300 CAPSULE ORAL at 08:09

## 2024-09-30 RX ADMIN — THERA TABS 1 TABLET: TAB at 08:09

## 2024-09-30 RX ADMIN — TAMSULOSIN HYDROCHLORIDE 0.4 MG: 0.4 CAPSULE ORAL at 08:09

## 2024-09-30 RX ADMIN — HYDROMORPHONE HYDROCHLORIDE 0.2 MG: 1 INJECTION, SOLUTION INTRAMUSCULAR; INTRAVENOUS; SUBCUTANEOUS at 05:09

## 2024-09-30 RX ADMIN — OXYCODONE HYDROCHLORIDE 10 MG: 5 TABLET ORAL at 03:09

## 2024-09-30 RX ADMIN — INSULIN ASPART 2 UNITS: 100 INJECTION, SOLUTION INTRAVENOUS; SUBCUTANEOUS at 08:09

## 2024-09-30 RX ADMIN — PIPERACILLIN SODIUM AND TAZOBACTAM SODIUM 4.5 G: 4; .5 INJECTION, POWDER, FOR SOLUTION INTRAVENOUS at 06:09

## 2024-09-30 RX ADMIN — PIPERACILLIN SODIUM AND TAZOBACTAM SODIUM 4.5 G: 4; .5 INJECTION, POWDER, FOR SOLUTION INTRAVENOUS at 03:09

## 2024-09-30 NOTE — PLAN OF CARE
Inpatient Upgrade Note    Saji Castañeda has warranted treatment spanning two or more midnights of hospital level care for the management of   leukocytosis/ CKD/ L hip wound  . He continues to require IV antibiotics, daily labs, monitoring of vital signs, and further evaluation by consultants. His condition is also complicated by the following comorbidities: Coronary Artery Disease, Diabetes, Immunosuppression, Chronic kidney disease, and  pseudomonas (zosyn sensitive) and MRSA. .

## 2024-09-30 NOTE — PROGRESS NOTES
[facility] ([place] [id number])  [facility] Medicine  Progress Note    Patient Name: Saji Castañeda  MRN: [id number]  Patient Class: IP- Inpatient   Admission Date: 9/29/2024  Length of Stay: 0 days  Attending Physician: Jm Rosa MD  Primary Care Provider: Vinod Elise MD        Subjective:     Principal Problem:Osteomyelitis        HPI:  Saji Castañeda is a 74 y.o. 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 Arellano, Chronic Multifactorial Anemia, BPH s/p Chronic Sams, paroxysmal A-fib on eliquis, CHB s/p PPM medtronic (10/12/2023), CKD3, Multifactorial HTN, HLD and Osteomyelitis of R foot presenting from [facility] with suspected infection of L hip wound. He is nonambulatory . Patient has chronic, extensive sacral wounds, a R heel wound and a wound on the L hip with purulence and a foul odor. The patient was recommended to come to the ED due to concerns of wound infection by nursing staff at NH. Patient reports intermittent pain at the wound sites that is controlled with Oxycodone. Patient states that wound care manages these sites at the nursing home. Patient is alert, oriented and responsive to questioning however he is a poor historian regarding his medical history. Per Chart Review the patient was admitted to the ED on 7/25 for AMS while on Vanc and Cefepime for Osteomyelitis. Cefepime neurotoxicity was suspected and therapy was switched to Vanc and Zosyn. He then developed  He was transferred to [facility] at that time and had a pacemaker placed. Patient reports currently being on oral antibiotic therapy but is unsure what he is taking. He reports taking his medications daily that the nurse at NH provides. He reports never smoking or using drugs and no longer drinks alcohol.      Patient reports chronic Sams cath usage for unknown reason that was last changed today. He reports an episode of dysuria  yesterday and endorses suprapubic pain on palpation. He denies hematuria, urinary frequency/urgency, urinary odor or abdominal pain.      He also reports a mild cough and chills for the past several weeks with scant sputum production but denies fever, chest pain, SOB or recent illness. He reports chronic diaphoresis at night that requires him to change clothes in the morning.      Overview/Hospital Course:   74 y.o. 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 Arellano, Chronic Multifactorial Anemia, BPH s/p Chronic Sams, paroxysmal A-fib on eliquis, CHB s/p PPM medtronic (10/12/2023), CKD3, Multifactorial HTN, HLD and Osteomyelitis of R foot presenting from [facility] with suspected infection of sacral wound. Started on Vanc and Zosyn given history of prior susceptibilities. General surgery consulted for wound debridement. Wound care following.     Interval History: General surgery consulted for wound debridement. NPO tonight. Podiatry following for history of R foot osteo. MRI ordered.     Review of Systems  Objective:     Vital Signs (Most Recent):  Temp: 98.8 °F (37.1 °C) (09/30/24 1556)  Pulse: 72 (09/30/24 1556)  Resp: 18 (09/30/24 1556)  BP: (!) 111/53 (09/30/24 1556)  SpO2: 100 % (09/30/24 1700) Vital Signs (24h Range):  Temp:  [97.8 °F (36.6 °C)-99.6 °F (37.6 °C)] 98.8 °F (37.1 °C)  Pulse:  [30-94] 72  Resp:  [11-20] 18  SpO2:  [97 %-100 %] 100 %  BP: (101-140)/(50-65) 111/53     Weight: 83.4 kg (183 lb 13.8 oz)  Body mass index is 27.15 kg/m².    Intake/Output Summary (Last 24 hours) at 9/30/2024 1752  Last data filed at 9/30/2024 0604  Gross per 24 hour   Intake 1609.01 ml   Output 1100 ml   Net 509.01 ml         Physical Exam  Constitutional:       General: He is not in acute distress.     Appearance: He is ill-appearing. He is not diaphoretic.   HENT:      Head: Normocephalic and atraumatic.       Right Ear: External ear normal.      Left Ear: External ear normal.      Mouth/Throat:      Mouth: Mucous membranes are dry.   Eyes:      General:         Right eye: No discharge.         Left eye: No discharge.      Extraocular Movements: Extraocular movements intact.   Cardiovascular:      Rate and Rhythm: Normal rate. Rhythm irregular.      Heart sounds: No murmur heard.  Pulmonary:      Effort: Pulmonary effort is normal. No respiratory distress.      Breath sounds: No wheezing.   Abdominal:      General: Abdomen is flat. There is no distension.      Tenderness: There is abdominal tenderness (suprapubic tenderness, and RLQ tenderness).   Musculoskeletal:      Cervical back: Normal range of motion.      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 Surgical scar well-healed  Right lower extremity with cracked, dry skin as well as right heel wound  Multiple areas of stage 3-4 pressure ulcers noted to sacrum   Right upper erosion measuring 4x3x2 is noted to significant depth without purulent drainage  Left upper sacrum reveals 2x3x2 irregular-edged wound with brown, foul-smelling purulent drainage       Skin:     General: Skin is dry.   Neurological:      General: No focal deficit present.      Mental Status: He is alert.             Significant Labs: All pertinent labs within the past 24 hours have been reviewed.    Significant Imaging: I have reviewed all pertinent imaging results/findings within the past 24 hours.    Assessment/Plan:      * Osteomyelitis  R heal OM diagnosed during admission 6/26-7/11 with bone cx positive for pseudomonas and MRSA. Discharged on vanc and cefepime for 6 week course.   Recent admission 8/2-8/6, at that time Cefepime discontinued due to c/f Cefepime neurotoxicity, then developed Vanc YNES, and discharged on Daptomycin and Zosyn  - extensive grade 3-4 sacral wounds, L hip wound purulence  - chronic osteomyelitis of R heel with nonhealing ulcer    - wound and blood cultures  pending   - started on IV Vanc in the ED, recommend renally dosing  - continue Vanc 1750 mg q12h   - continue Zosyn  - consulted wound care, appreciate recommendations  - WBC elevated 13.6, likely due to wound infection  - CXR pending   - trend CBC and CMP  - procalcitonin 0.12  - continue oxycodone and tylenol PRN for pain  - continue IV fluids   - per pharmacy, obtain vanc trough on 10/01 at 6:00 before giving 4th dose, range 10-20 mcg/mL  - podiatry consulted  - MRI ordered       Chronic indwelling Fung catheter  Pt has failed multiple void trials in the past, now with chronic fung. Most recently replaced 9/29. C/f UTI  UA shows +1 blood, 3+ leuk esterase, >100 WBCs and few bacteria, patient reports episode of dysuria yesterday and suprapubic pain on palpation   - culture pending   - last Fung change today         Anemia  Anemia is likely due to chronic disease due to Chronic Kidney Disease. Most recent hemoglobin and hematocrit are listed below.  Recent Labs     09/29/24  1446 09/30/24  0042 09/30/24  1200   HGB 6.3* 7.2* 7.3*   HCT 21.3* 24.1* 23.8*       Plan  - Monitor serial CBC: daily  - Transfuse PRBC if patient becomes hemodynamically unstable, symptomatic or H/H drops below 7/21.  - Patient has received 1 units of PRBCs on 9/29  - Patient's anemia is currently   6.3, below baseline 8-9  - Likely multifactorial with AOCD, as well as potentially GI bleed vs  AVM. Continue to monitor  - H&H 6.3 and 21.6   - hold iron supplement   - guaic + stool test  - Elevated ferritin, anemia 2/2 chronic disease      Stage 3b chronic kidney disease  Creatine stable for now. BMP reviewed- noted Estimated Creatinine Clearance: 66.7 mL/min (based on SCr of 1.1 mg/dL). according to latest data. Based on current GFR, CKD stage is stage 3 - GFR 30-59.  Monitor UOP and serial BMP and adjust therapy as needed. Renally dose meds. Avoid nephrotoxic medications and procedures.    - hold nephrotoxic medications  - hold Lasix at  this time   - renally dose Vanc    Paroxysmal atrial fibrillation  Patient with Paroxysmal (<7 days) atrial fibrillation which is uncontrolled. Patient is currently in atrial fibrillation.      echo on 7/2024 showed EF of 55-60% with no significant valvular abnormalities   - follows with Vascular Surgeon Dr. Banda  - hold home Lasix and HCTZ, not volume overloaded   - electrolytes stable, trend CMP  - hold home Eliquis due to unstable H&H, trend CBC  - monitor Afib, continuous telemetry     Wound of left leg  Left hip wound with purulent foul smelling drainage    - Wound cx ordered, f/u results  - On Vanc/Zosy  - Gen surg consulted for debridement         Type 2 diabetes mellitus, with long-term current use of insulin   Hb A1c on 7/25 was 6  - Lantus 12 units BID  - continue LDSSI   - POCT glucose checks before meals and nightly      Essential hypertension  Chronic, uncontrolled. Latest blood pressure and vitals reviewed-     Temp:  [98.7 °F (37.1 °C)-99 °F (37.2 °C)]   Pulse:  []   Resp:  [11-21]   BP: ()/(50-71)   SpO2:  [95 %-100 %] .   Home meds for hypertension were reviewed and noted below.   Hypertension Medications               furosemide (LASIX) 20 MG tablet Take 1 tablet (20 mg total) by mouth once daily.    NIFEdipine (PROCARDIA-XL) 90 MG (OSM) 24 hr tablet Take 1 tablet (90 mg total) by mouth once daily.            While in the hospital, will manage blood pressure as follows; Adjust home antihypertensive regimen as follows- home home Nifedipine    Will utilize p.r.n. blood pressure medication only if patient's blood pressure greater than 180/110 and he develops symptoms such as worsening chest pain or shortness of breath.     - hold diuretics, no signs of volume overload           VTE Risk Mitigation (From admission, onward)           Ordered     Reason for No Pharmacological VTE Prophylaxis  Once        Question:  Reasons:  Answer:  Risk of Bleeding  Comment:  c/f bleeding with drop in  Hgb    09/29/24 1914     IP VTE HIGH RISK PATIENT  Once         09/29/24 1914     Place sequential compression device  Until discontinued         09/29/24 1914                    Discharge Planning   OXANA: 10/4/2024     Code Status: Full Code   Is the patient medically ready for discharge?:     Reason for patient still in hospital (select all that apply): Patient trending condition  Discharge Plan A: Return to nursing home ([place] [phone])                  Nayeli Urena DO  Department of Hospital Medicine   [facility] ([place]

## 2024-09-30 NOTE — ASSESSMENT & PLAN NOTE
Left hip wound with purulent foul smelling drainage    - Wound cx ordered, f/u results  - On Vanc/Zosy  - Gen surg consulted for debridement        No

## 2024-09-30 NOTE — CONSULTS
[facility] ([place] [address])  Wound Care    Patient Name:  Saji Castañeda   MRN:  [id number]  Date: 9/30/2024  Diagnosis: Osteomyelitis    History:     Past Medical History:   Diagnosis Date    Arthritis     legs    Bacteremia due to Gram-negative bacteria 3/23/2021    Diabetes mellitus     Diabetes mellitus, type 2     Hyperlipidemia     Hypertension     Osteomyelitis     Palliative care encounter 5/24/2023       Social History     Socioeconomic History    Marital status: Single   Tobacco Use    Smoking status: Never    Smokeless tobacco: Never   Substance and Sexual Activity    Alcohol use: No     Comment: occassional    Drug use: No    Sexual activity: Not Currently   Social History Narrative    Not currently working; lives with family     Social Drivers of Health     Financial Resource Strain: Low Risk  (9/30/2024)    Overall Financial Resource Strain (CARDIA)     Difficulty of Paying Living Expenses: Not hard at all   Food Insecurity: No Food Insecurity (9/30/2024)    Hunger Vital Sign     Worried About Running Out of Food in the Last Year: Never true     Ran Out of Food in the Last Year: Never true   Transportation Needs: No Transportation Needs (9/30/2024)    TRANSPORTATION NEEDS     Transportation : No   Physical Activity: Insufficiently Active (9/30/2024)    Exercise Vital Sign     Days of Exercise per Week: 3 days     Minutes of Exercise per Session: 20 min   Stress: Stress Concern Present (9/30/2024)    [employer] - Occupational Stress Questionnaire     Feeling of Stress : To some extent   Housing Stability: Low Risk  (9/30/2024)    Housing Stability Vital Sign     Unable to Pay for Housing in the Last Year: No     Homeless in the Last Year: No       Precautions:     Allergies as of 09/29/2024    (No Known Allergies)       WOC Assessment Details/Treatment     Pt seen for WC consult for multiple chronic wounds.  Chart reviewed for this encounter.  See flowsheets for  findings and assessment.    Pt found lying in bed, agreeable to care at this time. Please see attached flowsheet for photos and care provided for pts multiple, chronic pressure related wounds.    RECOMMENDATIONS:  Q2 days & PRN for saturation - sacrum, BL ischium - cleanse gently w/ Vashe, pat dry and loosely pack wound beds w/ Aquacel Ag, cover w/ foam dressing. (Vashe and Aquacel Ag can be ordered through [employer], Aquacel Ag will populate as, 'dressing Ag extra hydrofiber'. If central is out of Aquacel, can use Vashe moistened gauze for packing).    Q2 days & PRN for saturation - R heel - cleanse gently w/ Vashe, pat dry and apply Hydrofera Blue to wound bed (Shiny side should not be on the wound bed) secure w/ foam dressing.     Daily - R great toe - cleanse gently w/ Vashe, pat dry and apply Betadine.    Q5 days - R hip, R distal calf - cleanse gently w/ Vashe, pat dry and apply Mepilex foam dressing.    Gen Surg consult for debridement of L ischium. Podiatry consult for R heel/great toe.     Immerse bed ordered for continued care.     09/30/24 1100   WOCN Assessment   WOCN Total Time (mins) 45   Visit Date 09/30/24   Visit Time 1100   Consult Type New   WOCN Speciality Wound   Wound pressure   Intervention assessed;changed;applied;chart review;coordination of care;consult other service;orders   Teaching on-going        Altered Skin Integrity 10/03/23 1900 Right posterior;midline Heel #2 Ulceration Full thickness tissue loss. Base is covered by slough and/or eschar in the wound bed   Date First Assessed/Time First Assessed: 10/03/23 1900   Altered Skin Integrity Present on Admission - Did Patient arrive to the hospital with altered skin?: (c) yes  Side: Right  Orientation: posterior;midline  Location: Heel  Wound Number: #2  Is th...   Wound Image    Description of Altered Skin Integrity Full thickness tissue loss. Base is covered by slough and/or eschar in the wound bed   Dressing Appearance Moist drainage    Drainage Amount Small   Drainage Characteristics/Odor Purulent   Appearance Pink;Red;Eschar;Slough;Wet   Tissue loss description Full thickness   Periwound Area Moist   Wound Edges Irregular   Care Cleansed with:;Antimicrobial agent   Dressing Applied;Methylene blue/gentian violet;Foam   Dressing Change Due 10/02/24        Wound 06/27/24 1710 Pressure Injury  Sacral spine   Date First Assessed/Time First Assessed: 06/27/24 1710   Present on Original Admission: Yes  Primary Wound Type: Pressure Injury  Side: (c)   Location: (c) Sacral spine   Wound Image    Pressure Injury Stage U   Dressing Appearance Saturated   Drainage Amount Moderate   Drainage Characteristics/Odor Serosanguineous   Appearance Pink;Red;Slough;Eschar;Wet   Tissue loss description Full thickness   Periwound Area Moist   Wound Edges Irregular;Open   Wound Length (cm) 11.6 cm   Wound Width (cm) 8.2 cm   Wound Depth (cm) 0.4 cm   Wound Volume (cm^3) 38.048 cm^3   Wound Surface Area (cm^2) 95.12 cm^2   Care Cleansed with:;Antimicrobial agent   Dressing Applied;Silver;Foam   Packing packed with;hydrofiber/alginate   Dressing Change Due 10/02/24        Wound 05/09/24 Pressure Injury Right Ischial tuberosity   Date First Assessed: 05/09/24   Present on Original Admission: Yes  Primary Wound Type: Pressure Injury  Side: Right  Location: Ischial tuberosity   Wound Image    Pressure Injury Stage U   Dressing Appearance Moist drainage   Drainage Amount Moderate   Drainage Characteristics/Odor Serosanguineous   Appearance Pink;Red;Slough;Eschar;Moist   Tissue loss description Full thickness   Periwound Area Intact;Moist   Wound Length (cm) 7.2 cm   Wound Width (cm) 3.1 cm   Wound Depth (cm) 3.4 cm   Wound Volume (cm^3) 75.888 cm^3   Wound Surface Area (cm^2) 22.32 cm^2   Care Cleansed with:;Antimicrobial agent   Dressing Applied;Silver;Foam   Packing packed with;hydrofiber/alginate   Dressing Change Due 10/02/24        Wound 09/30/24 1100 Pressure Injury  Left Ischial tuberosity   Date First Assessed/Time First Assessed: 09/30/24 1100   Present on Original Admission: Yes  Primary Wound Type: Pressure Injury  Side: Left  Location: Ischial tuberosity   Wound Image    Pressure Injury Stage U   Dressing Appearance Saturated   Drainage Amount Large   Drainage Characteristics/Odor Creamy;Tan;Purulent;Malodorous   Appearance Slough;Eschar;Necrotic;Wet;Pink   Tissue loss description Full thickness   Periwound Area Intact;Moist   Wound Length (cm) 4.9 cm   Wound Width (cm) 2.2 cm   Wound Depth (cm) 3.6 cm   Wound Volume (cm^3) 38.808 cm^3   Wound Surface Area (cm^2) 10.78 cm^2   Care Cleansed with:;Antimicrobial agent   Dressing Applied;Silver;Foam   Packing packed with;hydrofiber/alginate   Dressing Change Due 10/02/24        Wound 09/30/24 1100 Ulceration Right Toe, first   Date First Assessed/Time First Assessed: 09/30/24 1100   Present on Original Admission: Yes  Primary Wound Type: Ulceration  Side: Right  Location: Toe, first   Wound Image    Dressing Appearance Open to air   Drainage Amount None   Drainage Characteristics/Odor No odor   Appearance Dry;Maroon;Purple   Tissue loss description Not applicable   Periwound Area Intact;Dry   Care Cleansed with:;Sterile normal saline;Applied:;Povidone iodine        Wound 09/30/24 1100 Pressure Injury Right Greater trochanter   Date First Assessed/Time First Assessed: 09/30/24 1100   Primary Wound Type: Pressure Injury  Side: Right  Location: Greater trochanter   Wound Image    Pressure Injury Stage 2   Dressing Appearance No dressing   Drainage Amount None   Drainage Characteristics/Odor No odor   Appearance Pink;Dry   Tissue loss description Partial thickness   Periwound Area Intact;Moist   Care Cleansed with:;Antimicrobial agent   Dressing Applied;Foam   Dressing Change Due 10/04/24        Wound 09/30/24 1100 Skin Tear Right distal;lower Leg   Date First Assessed/Time First Assessed: 09/30/24 1100   Present on Original  Admission: Yes  Primary Wound Type: Skin Tear  Side: Right  Orientation: distal;lower  Location: Leg   Dressing Appearance Open to air   Drainage Amount None   Drainage Characteristics/Odor No odor   Appearance Pink;Dry   Tissue loss description Partial thickness   Periwound Area Intact;Dry   Care Cleansed with:;Antimicrobial agent   Dressing Applied;Foam   Dressing Change Due 10/04/24

## 2024-09-30 NOTE — SUBJECTIVE & OBJECTIVE
Scheduled Meds:   ascorbic acid (vitamin C)  500 mg Oral BID    [START ON 10/4/2024] ergocalciferol  50,000 Units Oral Every Fri    gabapentin  300 mg Oral BID    insulin glargine U-100  6 Units Subcutaneous BID    multivitamin  1 tablet Oral Daily    piperacillin-tazobactam (Zosyn) IV (PEDS and ADULTS) (extended infusion is not appropriate)  4.5 g Intravenous Q8H    tamsulosin  0.4 mg Oral Daily    [START ON 10/1/2024] vancomycin (VANCOCIN) IV (PEDS and ADULTS)  1,250 mg Intravenous Q24H     Continuous Infusions:  PRN Meds:  Current Facility-Administered Medications:     acetaminophen, 650 mg, Oral, Q6H PRN    dextrose 10%, 12.5 g, Intravenous, PRN    dextrose 10%, 25 g, Intravenous, PRN    diphenhydrAMINE, 25 mg, Oral, Q6H PRN    glucagon (human recombinant), 1 mg, Intramuscular, PRN    glucose, 16 g, Oral, PRN    glucose, 24 g, Oral, PRN    HYDROmorphone, 0.2 mg, Intravenous, Q6H PRN    insulin aspart U-100, 0-5 Units, Subcutaneous, QID (AC + HS) PRN    naloxone, 0.02 mg, Intravenous, PRN    oxyCODONE, 10 mg, Oral, Q6H PRN    oxyCODONE, 5 mg, Oral, Q6H PRN    sodium chloride 0.9%, 10 mL, Intravenous, Q12H PRN    vancomycin - pharmacy to dose, , Intravenous, pharmacy to manage frequency    Review of patient's allergies indicates:  No Known Allergies     Past Medical History:   Diagnosis Date    Arthritis     legs    Bacteremia due to Gram-negative bacteria 3/23/2021    Diabetes mellitus     Diabetes mellitus, type 2     Hyperlipidemia     Hypertension     Osteomyelitis     Palliative care encounter 5/24/2023     Past Surgical History:   Procedure Laterality Date    ABOVE-KNEE AMPUTATION Left 3/27/2024    Procedure: AMPUTATION, ABOVE KNEE;  Surgeon: Adal Arellano MD;  Location: [facility] OR [address] [place] [address];  Service: Vascular;  Laterality: Left;    ANGIOGRAPHY OF LOWER EXTREMITY N/A 2/3/2021    Procedure: Angiogram Extremity Bilateral;  Surgeon: Ernst Chacko MD;  Location: [facility] OR [address] [place] [address];  Service:  Peripheral Vascular;  Laterality: N/A;  7.4 mintues fluoroscopy time  816.15 mGy  170.17 Gycm2    AORTOGRAPHY WITH EXTREMITY RUNOFF Bilateral 2/3/2021    Procedure: AORTOGRAM, WITH EXTREMITY RUNOFF;  Surgeon: Ernst Chacko MD;  [facility];  Service: Peripheral Vascular;  Laterality: Bilateral;    DEBRIDEMENT OF FOOT Left 2/23/2021    Procedure: DEBRIDEMENT, LEFT HEEL;  Surgeon: Mayra Schroeder DPM;  [facility];  Service: Podiatry;  Laterality: Left;    ESOPHAGOGASTRODUODENOSCOPY N/A 6/28/2024    Procedure: EGD (ESOPHAGOGASTRODUODENOSCOPY);  Surgeon: Adal Chandra MD;  Location: [facility] ENDO;  Service: Gastroenterology;  Laterality: N/A;    FOOT AMPUTATION  October 2010    left high midfoot amputation    IMPLANTATION OF LEADLESS PACEMAKER N/A 10/12/2023    Procedure: [id number];  Surgeon: VANESSA Delatorre MD;  Location: [facility] EP LAB;  Service: Cardiology;  Laterality: N/A;  AVB, MICRA, EH, [place], [place] [id number]       Family History       Problem Relation (Age of Onset)    Cancer Brother    Diabetes Mother, Sister    Heart disease Mother    Stroke Sister          Tobacco Use    Smoking status: Never    Smokeless tobacco: Never   Substance and Sexual Activity    Alcohol use: No     Comment: occassional    Drug use: No    Sexual activity: Not Currently     Review of Systems   Constitutional:  Negative for chills and fever.   Gastrointestinal:  Negative for nausea and vomiting.     Objective:     Vital Signs (Most Recent):  Temp: 98.8 °F (37.1 °C) (09/30/24 1556)  Pulse: 72 (09/30/24 1556)  Resp: 18 (09/30/24 1556)  BP: (!) 111/53 (09/30/24 1556)  SpO2: 100 % (09/30/24 1556) Vital Signs (24h Range):  Temp:  [97.8 °F (36.6 °C)-99.6 °F (37.6 °C)] 98.8 °F (37.1 °C)  Pulse:  [30-94] 72  Resp:  [11-21] 18  SpO2:  [97 %-100 %] 100 %  BP: (101-140)/(50-65) 111/53     Weight: 83.4 kg (183 lb 13.8 oz)  Body mass index is 27.15 kg/m².    Foot  Exam    General  Affect: appropriate   Gait: (non ambulatory)      Right Foot/Ankle     Inspection and Palpation  Ecchymosis: none  Tenderness: none   Swelling: none   Skin Exam: dry skin, skin changes, ulcer (posterior heel) and erythema; skin not intact     Neurovascular  Dorsalis pedis: doppler  Posterior tibial: doppler  Saphenous nerve sensation: absent  Tibial nerve sensation: absent  Superficial peroneal nerve sensation: absent  Deep peroneal nerve sensation: absent  Sural nerve sensation: absent    Comments  Large Right posterior heel wound with osseous granular base, natalie wound dry eschar, malodor. Without drainage, purulence, bleeding. Probes to bone. Diffusely xerotic.    Left Foot/Ankle      Comments  S/p AKA    Clinical media:        Laboratory:  A1C:   Recent Labs   Lab 06/26/24  1200 07/25/24  2115   HGBA1C 6.8* 6.0*     CBC:   Recent Labs   Lab 09/30/24  1200   WBC 14.14*   RBC 2.88*   HGB 7.3*   HCT 23.8*   *   MCV 83   MCH 25.3*   MCHC 30.7*     CMP:   Recent Labs   Lab 09/30/24  0615   *   CALCIUM 8.2*   ALBUMIN 1.2*   PROT 5.8*      K 4.5   CO2 24      BUN 29*   CREATININE 0.9   ALKPHOS 87   ALT 27   AST 19   BILITOT 1.3*     CRP:   Recent Labs   Lab 09/30/24  0615   .6*     Wound Cultures:   Recent Labs   Lab 07/01/24  1241   LABAERO METHICILLIN RESISTANT STAPHYLOCOCCUS AUREUS  Few  *  PSEUDOMONAS AERUGINOSA   Few  *       Diagnostic Results:  I have reviewed all pertinent imaging results/findings within the past 24 hours.

## 2024-09-30 NOTE — PLAN OF CARE
I have reviewed the chart of Saji Castañeda who is hospitalized for the following:    Active Hospital Problems    Diagnosis    *Osteomyelitis    Thrombocytosis    Anemia    Chronic indwelling Sams catheter    Stage 3b chronic kidney disease    Wound of left leg    Type 2 diabetes mellitus, with long-term current use of insulin    Essential hypertension        Nhi Hernandez PA-C  [facility]

## 2024-09-30 NOTE — SUBJECTIVE & OBJECTIVE
"No current facility-administered medications on file prior to encounter.     Current Outpatient Medications on File Prior to Encounter   Medication Sig    acetaminophen (TYLENOL) 325 MG tablet Take 650 mg by mouth every 6 (six) hours as needed for Pain.    apixaban (ELIQUIS) 5 mg Tab Take 1 tablet (5 mg total) by mouth 2 (two) times daily.    ascorbic acid, vitamin C, (VITAMIN C) 500 MG tablet Take 500 mg by mouth 2 (two) times daily.    BD AUTOSHIELD DUO PEN NEEDLE 30 gauge x 3/16" Ndle     BD ULTRA-FINE ADITYA PEN NEEDLE 32 gauge x 5/32" Ndle USE FOUR TIMES DAILY WITH MEALS AND NIGHTLY    blood sugar diagnostic (CONTOUR TEST STRIPS) Strp 1 strip by Misc.(Non-Drug; Combo Route) route 4 (four) times daily. Use to check blood glucose 4x daily    diphenhydrAMINE (BENADRYL) 25 mg capsule Take 1 capsule (25 mg total) by mouth every 6 (six) hours as needed for Itching.    ergocalciferol (ERGOCALCIFEROL) 50,000 unit Cap Take 1 capsule (50,000 Units total) by mouth Every Friday. for 10 doses    ferrous sulfate 325 (65 FE) MG EC tablet Take 325 mg by mouth 2 (two) times daily.    furosemide (LASIX) 20 MG tablet Take 1 tablet (20 mg total) by mouth once daily.    gabapentin (NEURONTIN) 300 MG capsule Take 300 mg by mouth 2 (two) times daily.    insulin aspart U-100 (NOVOLOG) 100 unit/mL (3 mL) InPn pen Inject 5 Units into the skin 3 (three) times daily with meals.    insulin aspart U-100 (NOVOLOG) 100 unit/mL injection Inject 2-10 Units into the skin 3 (three) times daily before meals. 0-149=0  150-200: 2 units  201-250: 4 units  251-300: 6 units  301-350: 8 units  351-1000=10 units, NOTIFY MD    lancets (MICROLET LANCET) Misc 1 each by Misc.(Non-Drug; Combo Route) route 4 (four) times daily. Use to check blood sugars 4x daily    LANTUS SOLOSTAR U-100 INSULIN 100 unit/mL (3 mL) InPn pen Inject 12 Units into the skin 2 (two) times a day.    menthol-zinc oxide (CALMOSEPTINE) 0.44-20.6 % Oint Apply topically as needed.    " multivitamin (THERAGRAN) per tablet Take 1 tablet by mouth once daily.    NIFEdipine (PROCARDIA-XL) 90 MG (OSM) 24 hr tablet Take 1 tablet (90 mg total) by mouth once daily.    oxyCODONE (ROXICODONE) 5 MG immediate release tablet Take 1 tablet (5 mg total) by mouth every 24 hours as needed for Pain.    pantoprazole (PROTONIX) 40 MG tablet Take 40 mg by mouth once daily. Before breakfast    potassium chloride SA (K-DUR,KLOR-CON) 20 MEQ tablet Take 20 mEq by mouth 2 (two) times daily.    povidone-iodine (BETADINE) 10 % external solution Apply topically as needed for Wound Care.    SANTYL ointment Apply topically once daily. unknown    tamsulosin (FLOMAX) 0.4 mg Cap Take 0.4 mg by mouth once daily.    TRUE METRIX GLUCOSE METER Misc     zinc sulfate (ZINCATE) 50 mg zinc (220 mg) capsule Take 220 mg by mouth every morning.    [DISCONTINUED] hydroCHLOROthiazide (HYDRODIURIL) 25 MG tablet Take 0.5 tablets (12.5 mg total) by mouth every Mon, Wed, Fri. Beginning on 7/4/2022       Review of patient's allergies indicates:  No Known Allergies    Past Medical History:   Diagnosis Date    Arthritis     legs    Bacteremia due to Gram-negative bacteria 3/23/2021    Diabetes mellitus     Diabetes mellitus, type 2     Hyperlipidemia     Hypertension     Osteomyelitis     Palliative care encounter 5/24/2023     Past Surgical History:   Procedure Laterality Date    ABOVE-KNEE AMPUTATION Left 3/27/2024    Procedure: AMPUTATION, ABOVE KNEE;  Surgeon: Adal Arellano MD;  Location: [facility] OR [address] [place] [address];  Service: Vascular;  Laterality: Left;    ANGIOGRAPHY OF LOWER EXTREMITY N/A 2/3/2021    Procedure: Angiogram Extremity Bilateral;  Surgeon: Ernst Chacko MD;  Location: [facility] OR [address] [place] [address];  Service: Peripheral Vascular;  Laterality: N/A;  7.4 mintues fluoroscopy time  816.15 mGy  170.17 Gycm2    AORTOGRAPHY WITH EXTREMITY RUNOFF Bilateral 2/3/2021    Procedure: AORTOGRAM, WITH EXTREMITY RUNOFF;  Surgeon: Ernst Chacko,  MD;  Location: [facility] OR 2ND FLR;  Service: Peripheral Vascular;  Laterality: Bilateral;    DEBRIDEMENT OF FOOT Left 2/23/2021    Procedure: DEBRIDEMENT, LEFT HEEL;  Surgeon: Mayra Schroeder DPM;  Location: [facility] OR 1ST FLR;  Service: Podiatry;  Laterality: Left;    ESOPHAGOGASTRODUODENOSCOPY N/A 6/28/2024    Procedure: EGD (ESOPHAGOGASTRODUODENOSCOPY);  Surgeon: Adal Chandra MD;  Location: [facility] ENDO;  Service: Gastroenterology;  Laterality: N/A;    FOOT AMPUTATION  October 2010    left high midfoot amputation    IMPLANTATION OF LEADLESS PACEMAKER N/A 10/12/2023    Procedure: [id number];  Surgeon: VANESSA Delatorre MD;  Location: [facility] EP LAB;  Service: Cardiology;  Laterality: N/A;  AVB, MICRA, EH, [place] [id number]     Family History       Problem Relation (Age of Onset)    Cancer Brother    Diabetes Mother, Sister    Heart disease Mother    Stroke Sister          Tobacco Use    Smoking status: Never    Smokeless tobacco: Never   Substance and Sexual Activity    Alcohol use: No     Comment: occassional    Drug use: No    Sexual activity: Not Currently     Review of Systems   Constitutional:  Negative for chills and fever.     Objective:     Vital Signs (Most Recent):  Temp: 98.8 °F (37.1 °C) (09/30/24 1556)  Pulse: 72 (09/30/24 1556)  Resp: 18 (09/30/24 1757)  BP: (!) 111/53 (09/30/24 1556)  SpO2: 100 % (09/30/24 1700) Vital Signs (24h Range):  Temp:  [97.8 °F (36.6 °C)-99.6 °F (37.6 °C)] 98.8 °F (37.1 °C)  Pulse:  [30-94] 72  Resp:  [11-20] 18  SpO2:  [97 %-100 %] 100 %  BP: (101-140)/(50-65) 111/53     Weight: 83.4 kg (183 lb 13.8 oz)  Body mass index is 27.15 kg/m².     Physical Exam  Vitals and nursing note reviewed.   Constitutional:       General: He is not in acute distress.     Appearance: He is ill-appearing.   HENT:      Head: Normocephalic and atraumatic.   Cardiovascular:      Rate and Rhythm: Normal rate.   Pulmonary:      Effort: No respiratory distress.    Musculoskeletal:      Comments: L AKA stump   Skin:     General: Skin is warm.      Comments: Left hip wound with necrotic edges and fibrinous material     Sacral wound with minimal fibrinous exudate. Clean wound base   Neurological:      Mental Status: He is alert.            I have reviewed all pertinent lab results within the past 24 hours.  CBC:   Recent Labs   Lab 09/30/24  1200   WBC 14.14*   RBC 2.88*   HGB 7.3*   HCT 23.8*   *   MCV 83   MCH 25.3*   MCHC 30.7*       Significant Diagnostics:  I have reviewed all pertinent imaging results/findings within the past 24 hours.

## 2024-09-30 NOTE — ASSESSMENT & PLAN NOTE
R heal OM diagnosed during admission 6/26-7/11 with bone cx positive for pseudomonas and MRSA. Discharged on vanc and cefepime for 6 week course.   Recent admission 8/2-8/6, at that time Cefepime discontinued due to c/f Cefepime neurotoxicity, then developed Vanc YNES, and discharged on Daptomycin and Zosyn  - extensive grade 3-4 sacral wounds, L hip wound purulence  - chronic osteomyelitis of R heel with nonhealing ulcer    - wound and blood cultures pending   - started on IV Vanc in the ED, recommend renally dosing  - continue Vanc 1750 mg q12h   - continue Zosyn  - consulted wound care, appreciate recommendations  - WBC elevated 13.6, likely due to wound infection  - CXR pending   - trend CBC and CMP  - procalcitonin 0.12  - continue oxycodone and tylenol PRN for pain  - continue IV fluids   - per pharmacy, obtain vanc trough on 10/01 at 6:00 before giving 4th dose, range 10-20 mcg/mL  - podiatry consulted  - MRI ordered

## 2024-09-30 NOTE — MEDICAL/APP STUDENT
"Saji Castañeda is a 74 y.o. 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 Arellano, Chronic Multifactorial Anemia, BPH s/p Chronic Sams, paroxysmal A-fib on eliquis, CHB s/p PPM medtronic (10/12/2023), CKD3, Multifactorial HTN, HLD and Osteomyelitis of R foot presenting from [facility] with suspected infection of L hip wound. He is nonambulatory. Patient has chronic, extensive b/t sacral wounds, a R heel wound and a wound on the L hip with purulence and a foul odor. Wound Care is following and Gen Surgery has been consulted. Patient reports pain in the wound site areas but denies any other complaints at this time. He is NPO for now. Holding anticoagulation for A-fib until recommendations are made. Patient received 1 unit of PBRCs and H&H is stable at this time. On Vanc and Zosyn for empiric coverage.    Patient reports chronic Sams cath usage for unknown reason that was last changed yesterday. He reports an episode of dysuria yesterday and endorses suprapubic pain on palpation. He denies hematuria, urinary frequency/urgency, urinary odor or abdominal pain. He reports resolution of cough.     Active Hospital Problems    Diagnosis  POA    *Osteomyelitis [M86.9]  Yes    Thrombocytosis [D75.839]  Yes    Anemia [D64.9]  Unknown    Chronic indwelling Sams catheter [Z97.8]  Not Applicable    Stage 3b chronic kidney disease [N18.32]  Yes    Wound of left leg [S81.802A]  Yes    Type 2 diabetes mellitus, with long-term current use of insulin [E11.9, Z79.4]  Not Applicable    Essential hypertension [I10]  Yes     Chronic      Resolved Hospital Problems   No resolved problems to display.     Temp: 98.6 °F (37 °C) (09/30/24 1142)  Pulse: 78 (09/30/24 1438)  Resp: 18 (09/30/24 1142)  BP: (!) 101/50 (09/30/24 1142)  SpO2: 100 % (09/30/24 1142)  Weight: 83.4 kg (183 lb 13.8 oz) (09/29/24 2000)  Height: 5' 9" (175.3 cm) " (09/29/24 2000)    Review of Systems   Constitutional:  Negative for chills and fever.   HENT:  Negative for sinus pain and sore throat.    Eyes:  Negative for blurred vision, photophobia and pain.   Respiratory:  Negative for cough, hemoptysis, shortness of breath and stridor.    Cardiovascular:  Negative for chest pain, palpitations, orthopnea and leg swelling.   Gastrointestinal:  Negative for abdominal pain, heartburn, nausea and vomiting.   Genitourinary:  Positive for dysuria. Negative for frequency and hematuria.   Musculoskeletal:  Negative for myalgias and neck pain.   Skin:  Negative for itching and rash.        Extensive sacral wounds w/ pain   Neurological:  Negative for dizziness, seizures and weakness.   Endo/Heme/Allergies:  Negative for polydipsia. Does not bruise/bleed easily.   Psychiatric/Behavioral:  Negative for depression, hallucinations and memory loss.        Physical Exam  Constitutional:       General: He is not in acute distress.     Appearance: Normal appearance. He is normal weight. He is not ill-appearing.   HENT:      Head: Normocephalic and atraumatic.      Nose: Nose normal. No congestion or rhinorrhea.   Eyes:      General: No scleral icterus.     Pupils: Pupils are equal, round, and reactive to light.   Cardiovascular:      Rate and Rhythm: Normal rate and regular rhythm.      Pulses: Normal pulses.      Heart sounds: Normal heart sounds. No murmur heard.     No gallop.   Pulmonary:      Effort: Pulmonary effort is normal. No respiratory distress.      Breath sounds: Normal breath sounds. No stridor. No wheezing or rhonchi.   Abdominal:      General: Abdomen is flat.      Palpations: Abdomen is soft.      Tenderness: There is abdominal tenderness.      Comments: Tenderness around RUQ and suprapubic pain on palpation   Musculoskeletal:      Cervical back: Normal range of motion. No rigidity or tenderness.      Right lower leg: No edema.      Comments: BKA of L leg   Skin:      Comments: Extensive sacral wounds, purulent wound on L hip   Neurological:      General: No focal deficit present.      Mental Status: He is alert and oriented to person, place, and time. Mental status is at baseline.      Cranial Nerves: No cranial nerve deficit.   Psychiatric:         Mood and Affect: Mood normal.         Behavior: Behavior normal.         Thought Content: Thought content normal.         Judgment: Judgment normal.           Assessment and Plan  Chronic Osteomyelitis of R heel  MRSA history  History of Carbapenem resistant Pseudomonas   - Recent history of IV Vanc and Zoysn 7/25  - extensive grade 3-4 sacral wounds, L hip wound purulence  - chronic osteomyelitis of R heel with nonhealing ulcer    - blood cultures no growth x1 day, wound cultures pending   - started on IV Vanc in the ED, recommend renally dosing  - continue Vanc 1750 mg q12h and Zosyn therapy, do not recommend Cefepime due to hx of neurotoxicity    - wound care following  - WBC elevated 23.2 this morning, trending down 14.1 at noon, likely due to wound infection  - CXR shows coarse interstitial attenuation, similar to prior  - trend CBC and CMP  - procalcitonin 0.12  - continue oxycodone and tylenol PRN for pain  - continue IV fluids   - per pharmacy, obtain vanc trough on 10/01 at 6:00 before giving 4th dose, range 10-20 mcg/mL  - consulted gen surgery, patient NPO, appreciate recommendations     Paroxysmal Atrial Fibrillation on Eliquis   Pacemaker   PVD  CHF  - echo on 7/2024 showed EF of 55-60% with no significant valvular abnormalities   - follows with Vascular Surgeon Dr. Banda  - hold home Lasix and HCTZ, not volume overloaded   - electrolytes stable, trend CMP  - hold Eliquis due to low H&H and awaiting gen surgery recs  - monitor Afib, continuous telemetry      Anemia of Chronic Disease   Acute Anemia   - H&H 7.3 and 23.8  - transfused 1 unit of PRBCs in ED  - hold iron supplement   - guaic + stool test     Below Knee  Amputation 3/24  - due to nonhealing L foot wound   - well-healed scar   - patient is non-ambulatory      Chronic Sams  Suspected Urinary Tract Infection  - UA shows +1 blood, 3+ leuk esterase, >100 WBCs and few bacteria, patient reports episode of dysuria yesterday and suprapubic pain on palpation   - culture pending   - last Sams change yesterday        Type 2 Diabetes Mellitus on Insulin   - Hb A1c on 7/25 was 6  - continue insulin sliding scale      CKD3  - hold nephrotoxic medications  - hold Lasix at this time   - renally dose Vanc     HTN  - normotensive at this time  - hold diuretics, no signs of volume overload      Hypoalbuminemia   - albumin 1.2    Raegan Melvin  9/30/2024

## 2024-09-30 NOTE — PROGRESS NOTES
Pharmacokinetic Assessment Follow Up: IV Vancomycin    Vancomycin Regimen Plan:  Based on assessment of prior regimens and associated levels, the vancomycin regimen has been updated to be 1250 mg (15 mg/kg) IV every 24 hours to start on 10/1/24.  Obtain a vancomycin level on 10/1/24 @ 0500 to ensure levels are not supratherapeutic before starting the new regimen.   Monitor for signs and symptoms of nephrotoxicity and infusion reactions.    Pharmacy will continue to follow and monitor vancomycin.    Please contact pharmacy for questions regarding this assessment.    Thank you for the consult,   Camille Patterson       Patient brief summary:  Saji Castañeda is a 74 y.o. male initiated on antimicrobial therapy with IV Vancomycin for treatment of SSTI/OM.    The patient's current regimen is 1750 mg (21 mg/kg) IV every 12 hours.    Drug Allergies:   Review of patient's allergies indicates:  No Known Allergies    Actual Body Weight:   83.4 kg    Renal Function:   Estimated Creatinine Clearance: 70.9 mL/min (based on SCr of 0.9 mg/dL).,       CBC (last 72 hours):  Recent Labs   Lab Result Units 09/29/24  1446 09/30/24  0042   WBC K/uL 13.66* 23.22*   Hemoglobin g/dL 6.3* 7.2*   Hematocrit % 21.3* 24.1*   Platelets K/uL 690* 547*   Gran % % 82.7* 88.2*   Lymph % % 8.1* 4.3*   Mono % % 3.8* 2.8*   Eosinophil % % 4.7 3.7   Basophil % % 0.1 0.2   Differential Method  Automated Automated       Metabolic Panel (last 72 hours):  Recent Labs   Lab Result Units 09/29/24  1446 09/29/24  1722 09/30/24  0615   Sodium mmol/L 138  --  137   Potassium mmol/L 5.0  --  4.5   Chloride mmol/L 109  --  106   CO2 mmol/L 21*  --  24   Glucose mg/dL 156*  --  140*   Glucose, UA   --  Negative  --    BUN mg/dL 31*  --  29*   Creatinine mg/dL 1.1  --  0.9   Albumin g/dL 1.3*  --  1.2*   Total Bilirubin mg/dL 0.1  --  1.3*   Alkaline Phosphatase U/L 86  --  87   AST U/L 35  --  19   ALT U/L 33  --  27   Magnesium mg/dL 1.7  --  1.5*   Phosphorus mg/dL  3.3  --  3.3       Vancomycin Administrations:  vancomycin given in the last 96 hours                     vancomycin 1.75 g in 5 % dextrose 500 mL IVPB (mg) 1,750 mg New Bag 09/30/24 0604    vancomycin (VANCOCIN) 2,500 mg in D5W 500 mL IVPB (mg) 2,500 mg New Bag 09/29/24 1639                    Microbiologic Results:  Microbiology Results (last 7 days)       Procedure Component Value Units Date/Time    Blood culture x two cultures. Draw prior to antibiotics. [[id number]] Collected: 09/29/24 1446    Order Status: Completed Specimen: Blood from Peripheral, Hand, Left Updated: 09/30/24 0115     Blood Culture, Routine No Growth to date    Narrative:      Aerobic and anaerobic    Blood culture x two cultures. Draw prior to antibiotics. [[id number]] Collected: 09/29/24 1446    Order Status: Completed Specimen: Blood from Peripheral, Antecubital, Right Updated: 09/30/24 0115     Blood Culture, Routine No Growth to date    Narrative:      Aerobic and anaerobic    Urine culture [[id number]] Collected: 09/29/24 1722    Order Status: No result Specimen: Urine Updated: 09/29/24 1807    Aerobic culture (Specify Source) **CANNOT BE ORDERED AS STAT* [[id number]] Collected: 09/29/24 1628    Order Status: Sent Specimen: Decubitus from Buttocks, Left Updated: 09/29/24 1650

## 2024-09-30 NOTE — ASSESSMENT & PLAN NOTE
Chronic ulceration of R posterior heel with possible acute on chronic Osteomyelitis of calcaneus. WBC elevated. VSS, afebrile. Radiographs demonstrate underlying or chronic OM of R calcaneus, possible infectious process at R distal 5th metatarsal. R heel wound probes to bone, clinically stable. Wound care consulted. MRI pending.     Plan:  - Physical exam and diagnostic findings discussed with patient  - No surgical intervention at this time  - Agree with wound care recommendations  - Right heel wound dressed by wound care team: Hydrofera blue and mepilex border.   - MRI ordered  - Abx per primary  - Podiatry to follow

## 2024-09-30 NOTE — ASSESSMENT & PLAN NOTE
Creatine stable for now. BMP reviewed- noted Estimated Creatinine Clearance: 66.7 mL/min (based on SCr of 1.1 mg/dL). according to latest data. Based on current GFR, CKD stage is stage 3 - GFR 30-59.  Monitor UOP and serial BMP and adjust therapy as needed. Renally dose meds. Avoid nephrotoxic medications and procedures.    - hold nephrotoxic medications  - hold Lasix at this time   - renally dose Vanc

## 2024-09-30 NOTE — ASSESSMENT & PLAN NOTE
Anemia is likely due to chronic disease due to Chronic Kidney Disease. Most recent hemoglobin and hematocrit are listed below.  Recent Labs     09/29/24  1446 09/30/24  0042 09/30/24  1200   HGB 6.3* 7.2* 7.3*   HCT 21.3* 24.1* 23.8*       Plan  - Monitor serial CBC: daily  - Transfuse PRBC if patient becomes hemodynamically unstable, symptomatic or H/H drops below 7/21.  - Patient has received 1 units of PRBCs on 9/29  - Patient's anemia is currently   6.3, below baseline 8-9  - Likely multifactorial with AOCD, as well as potentially GI bleed vs  AVM. Continue to monitor  - H&H 6.3 and 21.6   - hold iron supplement   - guaic + stool test  - Elevated ferritin, anemia 2/2 chronic disease

## 2024-09-30 NOTE — ASSESSMENT & PLAN NOTE
R heal OM diagnosed during admission 6/26-7/11 with bone cx positive for pseudomonas and MRSA. Discharged on vanc and cefepime for 6 week course.   Recent admission 8/2-8/6, at that time Cefepime discontinued due to c/f Cefepime neurotoxicity, then developed Vanc YNES, and discharged on Daptomycin and Zosyn  - extensive grade 3-4 sacral wounds, L hip wound purulence  - chronic osteomyelitis of R heel with nonhealing ulcer    - wound and blood cultures pending   - started on IV Vanc in the ED, recommend renally dosing  - continue Vanc 1750 mg q12h   - continue Zosyn  - consulted wound care, appreciate recommendations  - WBC elevated 13.6, likely due to wound infection  - CXR pending   - trend CBC and CMP  - procalcitonin 0.12  - continue oxycodone and tylenol PRN for pain  - continue IV fluids   - per pharmacy, obtain vanc trough on 10/01 at 6:00 before giving 4th dose, range 10-20 mcg/mL

## 2024-09-30 NOTE — ASSESSMENT & PLAN NOTE
Patient with Paroxysmal (<7 days) atrial fibrillation which is uncontrolled. Patient is currently in atrial fibrillation.      echo on 7/2024 showed EF of 55-60% with no significant valvular abnormalities   - follows with Vascular Surgeon Dr. Banda  - hold home Lasix and HCTZ, not volume overloaded   - electrolytes stable, trend CMP  - hold home Eliquis due to unstable H&H, trend CBC  - monitor Afib, continuous telemetry

## 2024-09-30 NOTE — H&P
[facility] ([address] Medicine  History & Physical    Patient Name: Saji Castañeda  MRN: [id number]  Patient Class: OP- Observation  Admission Date: 9/29/2024  Attending Physician: Jm Rosa MD   Primary Care Provider: Vinod Elise MD         Patient information was obtained from patient and ER records.     Subjective:     Principal Problem:Osteomyelitis    Chief Complaint:   Chief Complaint   Patient presents with    Wound Infection     Arrives via EMS from [place] in [facility]. Draining wound to buttocks, nonstop today with odor. Left AKA.         HPI: Saji Castañeda is a 74 y.o. 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 Arellano, Chronic Multifactorial Anemia, BPH s/p Chronic Sams, paroxysmal A-fib on eliquis, CHB s/p PPM medtronic (10/12/2023), CKD3, Multifactorial HTN, HLD and Osteomyelitis of R foot presenting from [facility] with suspected infection of L hip wound. He is nonambulatory . Patient has chronic, extensive sacral wounds, a R heel wound and a wound on the L hip with purulence and a foul odor. The patient was recommended to come to the ED due to concerns of wound infection by nursing staff at NH. Patient reports intermittent pain at the wound sites that is controlled with Oxycodone. Patient states that wound care manages these sites at the nursing home. Patient is alert, oriented and responsive to questioning however he is a poor historian regarding his medical history. Per Chart Review the patient was admitted to the ED on 7/25 for AMS while on Vanc and Cefepime for Osteomyelitis. Cefepime neurotoxicity was suspected and therapy was switched to Vanc and Zosyn. He then developed  He was transferred to [facility] at that time and had a pacemaker placed. Patient reports currently being on oral antibiotic therapy but is unsure what he is taking. He reports taking his  medications daily that the nurse at NH provides. He reports never smoking or using drugs and no longer drinks alcohol.      Patient reports chronic Sams cath usage for unknown reason that was last changed today. He reports an episode of dysuria yesterday and endorses suprapubic pain on palpation. He denies hematuria, urinary frequency/urgency, urinary odor or abdominal pain.      He also reports a mild cough and chills for the past several weeks with scant sputum production but denies fever, chest pain, SOB or recent illness. He reports chronic diaphoresis at night that requires him to change clothes in the morning.      Past Medical History:   Diagnosis Date    Arthritis     legs    Bacteremia due to Gram-negative bacteria 3/23/2021    Diabetes mellitus     Diabetes mellitus, type 2     Hyperlipidemia     Hypertension     Osteomyelitis     Palliative care encounter 5/24/2023       Past Surgical History:   Procedure Laterality Date    ABOVE-KNEE AMPUTATION Left 3/27/2024    Procedure: AMPUTATION, ABOVE KNEE;  Surgeon: Adal Arellano MD;  Location: [address];  Service: Vascular;  Laterality: Left;    ANGIOGRAPHY OF LOWER EXTREMITY N/A 2/3/2021    Procedure: Angiogram Extremity Bilateral;  Surgeon: Ernst Chacko MD;  Location: [address];  Service: Peripheral Vascular;  Laterality: N/A;  7.4 mintues fluoroscopy time  816.15 mGy  170.17 Gycm2    AORTOGRAPHY WITH EXTREMITY RUNOFF Bilateral 2/3/2021    Procedure: AORTOGRAM, WITH EXTREMITY RUNOFF;  Surgeon: Ernst Chacko MD;  Location: [address];  Service: Peripheral Vascular;  Laterality: Bilateral;    DEBRIDEMENT OF FOOT Left 2/23/2021    Procedure: DEBRIDEMENT, LEFT HEEL;  Surgeon: Mayra Schroeder DPM;  Location: [address];  Service: Podiatry;  Laterality: Left;    ESOPHAGOGASTRODUODENOSCOPY N/A 6/28/2024    Procedure: EGD (ESOPHAGOGASTRODUODENOSCOPY);  Surgeon: Adal Chandra MD;  Location: [facility]  "ENDO;  Service: Gastroenterology;  Laterality: N/A;    FOOT AMPUTATION  October 2010    left high midfoot amputation    IMPLANTATION OF LEADLESS PACEMAKER N/A 10/12/2023    Procedure: [id number];  Surgeon: VANESSA Delatorre MD;  Location: [facility] EP LAB;  Service: Cardiology;  Laterality: N/A;  AVB, MICRA, EH, ANES, [place] [id number]       Review of patient's allergies indicates:  No Known Allergies    No current facility-administered medications on file prior to encounter.     Current Outpatient Medications on File Prior to Encounter   Medication Sig    acetaminophen (TYLENOL) 325 MG tablet Take 650 mg by mouth every 6 (six) hours as needed for Pain.    apixaban (ELIQUIS) 5 mg Tab Take 1 tablet (5 mg total) by mouth 2 (two) times daily.    ascorbic acid, vitamin C, (VITAMIN C) 500 MG tablet Take 500 mg by mouth 2 (two) times daily.    BD AUTOSHIELD DUO PEN NEEDLE 30 gauge x 3/16" Ndle     BD ULTRA-FINE ADITYA PEN NEEDLE 32 gauge x 5/32" Ndle USE FOUR TIMES DAILY WITH MEALS AND NIGHTLY    blood sugar diagnostic (CONTOUR TEST STRIPS) Strp 1 strip by Misc.(Non-Drug; Combo Route) route 4 (four) times daily. Use to check blood glucose 4x daily    diphenhydrAMINE (BENADRYL) 25 mg capsule Take 1 capsule (25 mg total) by mouth every 6 (six) hours as needed for Itching.    ergocalciferol (ERGOCALCIFEROL) 50,000 unit Cap Take 1 capsule (50,000 Units total) by mouth Every Friday. for 10 doses    ferrous sulfate 325 (65 FE) MG EC tablet Take 325 mg by mouth 2 (two) times daily.    furosemide (LASIX) 20 MG tablet Take 1 tablet (20 mg total) by mouth once daily.    gabapentin (NEURONTIN) 300 MG capsule Take 300 mg by mouth 2 (two) times daily.    insulin aspart U-100 (NOVOLOG) 100 unit/mL (3 mL) InPn pen Inject 5 Units into the skin 3 (three) times daily with meals.    insulin aspart U-100 (NOVOLOG) 100 unit/mL injection Inject 2-10 Units into the skin 3 (three) times daily before meals. 0-149=0  150-200: 2 units  201-250: " 4 units  251-300: 6 units  301-350: 8 units  351-1000=10 units, NOTIFY MD    lancets (MICROLET LANCET) Misc 1 each by Misc.(Non-Drug; Combo Route) route 4 (four) times daily. Use to check blood sugars 4x daily    LANTUS SOLOSTAR U-100 INSULIN 100 unit/mL (3 mL) InPn pen Inject 12 Units into the skin 2 (two) times a day.    menthol-zinc oxide (CALMOSEPTINE) 0.44-20.6 % Oint Apply topically as needed.    multivitamin (THERAGRAN) per tablet Take 1 tablet by mouth once daily.    NIFEdipine (PROCARDIA-XL) 90 MG (OSM) 24 hr tablet Take 1 tablet (90 mg total) by mouth once daily.    oxyCODONE (ROXICODONE) 5 MG immediate release tablet Take 1 tablet (5 mg total) by mouth every 24 hours as needed for Pain.    pantoprazole (PROTONIX) 40 MG tablet Take 40 mg by mouth once daily. Before breakfast    potassium chloride SA (K-DUR,KLOR-CON) 20 MEQ tablet Take 20 mEq by mouth 2 (two) times daily.    povidone-iodine (BETADINE) 10 % external solution Apply topically as needed for Wound Care.    SANTYL ointment Apply topically once daily. unknown    tamsulosin (FLOMAX) 0.4 mg Cap Take 0.4 mg by mouth once daily.    TRUE METRIX GLUCOSE METER Misc     zinc sulfate (ZINCATE) 50 mg zinc (220 mg) capsule Take 220 mg by mouth every morning.    [DISCONTINUED] hydroCHLOROthiazide (HYDRODIURIL) 25 MG tablet Take 0.5 tablets (12.5 mg total) by mouth every Mon, Wed, Fri. Beginning on 7/4/2022     Family History       Problem Relation (Age of Onset)    Cancer Brother    Diabetes Mother, Sister    Heart disease Mother    Stroke Sister          Tobacco Use    Smoking status: Never    Smokeless tobacco: Never   Substance and Sexual Activity    Alcohol use: No     Comment: occassional    Drug use: No    Sexual activity: Not Currently     Review of Systems   Constitutional:  Positive for appetite change (low) and chills.   HENT:  Negative for congestion.    Respiratory:  Positive for cough (with some mucous production). Negative for shortness of  breath.    Cardiovascular:  Negative for chest pain and leg swelling.   Gastrointestinal:  Positive for abdominal pain. Negative for abdominal distention, constipation, diarrhea, nausea and vomiting.   Genitourinary:  Positive for dysuria. Negative for difficulty urinating, frequency, hematuria and urgency.   Skin:  Positive for wound.     Objective:     Vital Signs (Most Recent):  Temp: 98.7 °F (37.1 °C) (09/29/24 1700)  Pulse: 79 (09/29/24 1830)  Resp: 18 (09/29/24 1830)  BP: (!) 113/55 (09/29/24 1830)  SpO2: 99 % (09/29/24 1830) Vital Signs (24h Range):  Temp:  [98.7 °F (37.1 °C)-99 °F (37.2 °C)] 98.7 °F (37.1 °C)  Pulse:  [] 79  Resp:  [13-21] 18  SpO2:  [95 %-100 %] 99 %  BP: ()/(50-71) 113/55     Weight: 97.1 kg (214 lb)  Body mass index is 31.6 kg/m².     Physical Exam  Constitutional:       General: He is not in acute distress.     Appearance: He is ill-appearing. He is not diaphoretic.   HENT:      Head: Normocephalic and atraumatic.      Right Ear: External ear normal.      Left Ear: External ear normal.      Mouth/Throat:      Mouth: Mucous membranes are dry.   Eyes:      General:         Right eye: No discharge.         Left eye: No discharge.      Extraocular Movements: Extraocular movements intact.   Cardiovascular:      Rate and Rhythm: Normal rate. Rhythm irregular.      Heart sounds: No murmur heard.  Pulmonary:      Effort: Pulmonary effort is normal. No respiratory distress.      Breath sounds: No wheezing.   Abdominal:      General: Abdomen is flat. There is no distension.      Tenderness: There is no abdominal tenderness.   Musculoskeletal:      Cervical back: Normal range of motion.      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 Surgical scar well-healed  Right lower extremity with cracked, dry skin as well as right heel wound  Multiple areas of stage 3-4 pressure ulcers noted to sacrum   Right upper erosion measuring 4x3x2 is noted to significant depth without purulent  drainage  Left upper sacrum reveals 2x3x2 irregular-edged wound with brown, foul-smelling purulent drainage       Skin:     General: Skin is dry.   Neurological:      General: No focal deficit present.      Mental Status: He is alert.                Significant Labs: All pertinent labs within the past 24 hours have been reviewed.    Significant Imaging: I have reviewed all pertinent imaging results/findings within the past 24 hours.  Assessment/Plan:     * Osteomyelitis  R heal OM diagnosed during admission 6/26-7/11 with bone cx positive for pseudomonas and MRSA. Discharged on vanc and cefepime for 6 week course.   Recent admission 8/2-8/6, at that time Cefepime discontinued due to c/f Cefepime neurotoxicity, then developed Vanc YNES, and discharged on Daptomycin and Zosyn  - extensive grade 3-4 sacral wounds, L hip wound purulence  - chronic osteomyelitis of R heel with nonhealing ulcer    - wound and blood cultures pending   - started on IV Vanc in the ED, recommend renally dosing  - continue Vanc 1750 mg q12h   - continue Zosyn  - consulted wound care, appreciate recommendations  - WBC elevated 13.6, likely due to wound infection  - CXR pending   - trend CBC and CMP  - procalcitonin 0.12  - continue oxycodone and tylenol PRN for pain  - continue IV fluids   - per pharmacy, obtain vanc trough on 10/01 at 6:00 before giving 4th dose, range 10-20 mcg/mL      Anemia  Anemia is likely due to chronic disease due to Chronic Kidney Disease. Most recent hemoglobin and hematocrit are listed below.  Recent Labs     09/29/24  1446   HGB 6.3*   HCT 21.3*     Plan  - Monitor serial CBC: Every 8 hours  - Transfuse PRBC if patient becomes hemodynamically unstable, symptomatic or H/H drops below 7/21.  - Patient has received 1 units of PRBCs on 9/29  - Patient's anemia is currently   6.3, below baseline 8-9  - Likely multifactorial with AOCD, as well as potentially GI bleed vs  AVM. Continue to monitor  - H&H 6.3 and 21.6   -  hold iron supplement   - guaic + stool test    Chronic indwelling Fung catheter  Pt has failed multiple void trials in the past, now with chronic fung. Most recently replaced 9/29. C/f UTI  UA shows +1 blood, 3+ leuk esterase, >100 WBCs and few bacteria, patient reports episode of dysuria yesterday and suprapubic pain on palpation   - culture pending   - last Fung change today         Stage 3b chronic kidney disease  Creatine stable for now. BMP reviewed- noted Estimated Creatinine Clearance: 66.7 mL/min (based on SCr of 1.1 mg/dL). according to latest data. Based on current GFR, CKD stage is stage 3 - GFR 30-59.  Monitor UOP and serial BMP and adjust therapy as needed. Renally dose meds. Avoid nephrotoxic medications and procedures.    - hold nephrotoxic medications  - hold Lasix at this time   - renally dose Vanc    Paroxysmal atrial fibrillation  Patient with Paroxysmal (<7 days) atrial fibrillation which is uncontrolled. Patient is currently in atrial fibrillation.      echo on 7/2024 showed EF of 55-60% with no significant valvular abnormalities   - follows with Vascular Surgeon Dr. Banda  - hold home Lasix and HCTZ, not volume overloaded   - electrolytes stable, trend CMP  - hold home Eliquis due to unstable H&H, trend CBC  - monitor Afib, continuous telemetry     Wound of left leg  Left hip wound with purulent foul smelling drainage    - Wound cx ordered, f/u results  - On Vanc/Zosyn    Type 2 diabetes mellitus, with long-term current use of insulin   Hb A1c on 7/25 was 6  - Lantus 12 units BID  - continue LDSSI   - POCT glucose checks before meals and nightly      Essential hypertension  Chronic, uncontrolled. Latest blood pressure and vitals reviewed-     Temp:  [98.7 °F (37.1 °C)-99 °F (37.2 °C)]   Pulse:  []   Resp:  [11-21]   BP: ()/(50-71)   SpO2:  [95 %-100 %] .   Home meds for hypertension were reviewed and noted below.   Hypertension Medications               furosemide (LASIX) 20  MG tablet Take 1 tablet (20 mg total) by mouth once daily.    NIFEdipine (PROCARDIA-XL) 90 MG (OSM) 24 hr tablet Take 1 tablet (90 mg total) by mouth once daily.            While in the hospital, will manage blood pressure as follows; Adjust home antihypertensive regimen as follows- home home Nifedipine    Will utilize p.r.n. blood pressure medication only if patient's blood pressure greater than 180/110 and he develops symptoms such as worsening chest pain or shortness of breath.     - hold diuretics, no signs of volume overload           VTE Risk Mitigation (From admission, onward)           Ordered     Reason for No Pharmacological VTE Prophylaxis  Once        Question:  Reasons:  Answer:  Risk of Bleeding  Comment:  c/f bleeding with drop in Hgb    09/29/24 1914     IP VTE HIGH RISK PATIENT  Once         09/29/24 1914     Place sequential compression device  Until discontinued         09/29/24 1914                           On 09/29/2024, patient should be placed in hospital observation services under my care in collaboration with Dr. Rosa.    Pharmacokinetic Initial Assessment & Plan: IV Vancomycin    IV Vancomycin 2500 mg x once given in the ED on 09/29 @ 1639  Then Vancomycin 1750 mg every 12 hours  Obtain a Vancomycin trough 30 mins prior to the 4 th dose on 10/01 @ 0600  Desired empiric serum trough concentration is 10 to 20 mcg/mL    Pharmacy will continue to follow and monitor vancomycin.    [id number] with any questions regarding this assessment.     Thank you for the consult,   Darwin Benito        Patient brief summary:  Saji Castañeda is a 74 y.o. male initiated on antimicrobial therapy with IV Vancomycin for treatment of suspected skin & soft tissue infection    Drug Allergies:   Review of patient's allergies indicates:  No Known Allergies    Actual Body Weight:   97.1 kg    Renal Function:   Estimated Creatinine Clearance: 66.7 mL/min (based on SCr of 1.1 mg/dL).,     CBC (last 72 hours):  Recent Labs  "  Lab Result Units 09/29/24  1446   WBC K/uL 13.66*   Hemoglobin g/dL 6.3*   Hematocrit % 21.3*   Platelets K/uL 690*   Gran % % 82.7*   Lymph % % 8.1*   Mono % % 3.8*   Eosinophil % % 4.7   Basophil % % 0.1   Differential Method  Automated       Metabolic Panel (last 72 hours):  Recent Labs   Lab Result Units 09/29/24  1446 09/29/24  1722   Sodium mmol/L 138  --    Potassium mmol/L 5.0  --    Chloride mmol/L 109  --    CO2 mmol/L 21*  --    Glucose mg/dL 156*  --    Glucose, UA   --  Negative   BUN mg/dL 31*  --    Creatinine mg/dL 1.1  --    Albumin g/dL 1.3*  --    Total Bilirubin mg/dL 0.1  --    Alkaline Phosphatase U/L 86  --    AST U/L 35  --    ALT U/L 33  --    Magnesium mg/dL 1.7  --    Phosphorus mg/dL 3.3  --        Drug levels (last 3 results):  No results for input(s): "VANCOMYCINRA", "VANCORANDOM", "VANCOMYCINPE", "VANCOPEAK", "VANCOMYCINTR", "VANCOTROUGH" in the last 72 hours.    Microbiologic Results:  Microbiology Results (last 7 days)       Procedure Component Value Units Date/Time    Urine culture [[id number]] Collected: 09/29/24 1722    Order Status: No result Specimen: Urine Updated: 09/29/24 1807    Aerobic culture (Specify Source) **CANNOT BE ORDERED AS STAT* [[id number]] Collected: 09/29/24 1628    Order Status: Sent Specimen: Decubitus from Buttocks, Left Updated: 09/29/24 1650    Blood culture x two cultures. Draw prior to antibiotics. [[id number]] Collected: 09/29/24 1446    Order Status: Sent Specimen: Blood from Peripheral, Hand, Left Updated: 09/29/24 1458    Blood culture x two cultures. Draw prior to antibiotics. [[id number]] Collected: 09/29/24 1446    Order Status: Sent Specimen: Blood from Peripheral, Antecubital, Right Updated: 09/29/24 1458              Anita Thomas MD  Department of Hospital Medicine  [facility] Stepdown Flex ([place])          "

## 2024-09-30 NOTE — ASSESSMENT & PLAN NOTE
Anemia is likely due to chronic disease due to Chronic Kidney Disease. Most recent hemoglobin and hematocrit are listed below.  Recent Labs     09/29/24  1446   HGB 6.3*   HCT 21.3*     Plan  - Monitor serial CBC: Every 8 hours  - Transfuse PRBC if patient becomes hemodynamically unstable, symptomatic or H/H drops below 7/21.  - Patient has received 1 units of PRBCs on 9/29  - Patient's anemia is currently   6.3, below baseline 8-9  - Likely multifactorial with AOCD, as well as potentially GI bleed vs  AVM. Continue to monitor  - H&H 6.3 and 21.6   - hold iron supplement   - guaic + stool test

## 2024-09-30 NOTE — PLAN OF CARE
Problem: Skin Injury Risk Increased  Goal: Skin Health and Integrity  Outcome: Progressing     Problem: Adult Inpatient Plan of Care  Goal: Plan of Care Review  Outcome: Progressing  Goal: Patient-Specific Goal (Individualized)  Outcome: Progressing  Goal: Absence of Hospital-Acquired Illness or Injury  Outcome: Progressing  Goal: Optimal Comfort and Wellbeing  Outcome: Progressing  Goal: Readiness for Transition of Care  Outcome: Progressing     Problem: Diabetes Comorbidity  Goal: Blood Glucose Level Within Targeted Range  Outcome: Progressing     Problem: Acute Kidney Injury/Impairment  Goal: Fluid and Electrolyte Balance  Outcome: Progressing  Goal: Improved Oral Intake  Outcome: Progressing  Goal: Effective Renal Function  Outcome: Progressing     Problem: Wound  Goal: Optimal Coping  Outcome: Progressing  Goal: Optimal Functional Ability  Outcome: Progressing  Goal: Absence of Infection Signs and Symptoms  Outcome: Progressing  Goal: Improved Oral Intake  Outcome: Progressing  Goal: Optimal Pain Control and Function  Outcome: Progressing  Goal: Skin Health and Integrity  Outcome: Progressing  Goal: Optimal Wound Healing  Outcome: Progressing

## 2024-09-30 NOTE — CONSULTS
[facility] - Step[facility] ([place] [address])  Podiatry  Consult Note    Patient Name: Saji Castañeda  MRN: [id number]  Admission Date: 9/29/2024  Hospital Length of Stay: 0 days  Attending Physician: Jm Rosa MD  Primary Care Provider: Vinod Elise MD     Inpatient consult to Podiatry  Consult performed by: Jovanni Stahl MD  Consult ordered by: Nayeli Urena DO  Reason for consult: R heel wound        Subjective:     History of Present Illness:  Saji Castañeda is a 74 y.o. male with a PMH of T2DM, PVD, left AKA, Chronic Multifactorial Anemia, BPH s/p Chronic Sams, paroxysmal A-fib on eliquis, CKD3, Multifactorial HTN, HLD and Osteomyelitis of R foot presenting from [facility] with suspected infection of L hip wound. He is nonambulatory. Podiatry consulted for R foot wound.    WBC elevated. VSS, afebrile. Radiographs demonstrate underlying or chronic OM of R calcaneus, possible infectious process at R distal 5th metatarsal.      Scheduled Meds:   ascorbic acid (vitamin C)  500 mg Oral BID    [START ON 10/4/2024] ergocalciferol  50,000 Units Oral Every Fri    gabapentin  300 mg Oral BID    insulin glargine U-100  6 Units Subcutaneous BID    multivitamin  1 tablet Oral Daily    piperacillin-tazobactam (Zosyn) IV (PEDS and ADULTS) (extended infusion is not appropriate)  4.5 g Intravenous Q8H    tamsulosin  0.4 mg Oral Daily    [START ON 10/1/2024] vancomycin (VANCOCIN) IV (PEDS and ADULTS)  1,250 mg Intravenous Q24H     Continuous Infusions:  PRN Meds:  Current Facility-Administered Medications:     acetaminophen, 650 mg, Oral, Q6H PRN    dextrose 10%, 12.5 g, Intravenous, PRN    dextrose 10%, 25 g, Intravenous, PRN    diphenhydrAMINE, 25 mg, Oral, Q6H PRN    glucagon (human recombinant), 1 mg, Intramuscular, PRN    glucose, 16 g, Oral, PRN    glucose, 24 g, Oral, PRN    HYDROmorphone, 0.2 mg, Intravenous, Q6H PRN    insulin aspart U-100, 0-5 Units, Subcutaneous, QID (AC + HS) PRN    naloxone,  0.02 mg, Intravenous, PRN    oxyCODONE, 10 mg, Oral, Q6H PRN    oxyCODONE, 5 mg, Oral, Q6H PRN    sodium chloride 0.9%, 10 mL, Intravenous, Q12H PRN    vancomycin - pharmacy to dose, , Intravenous, pharmacy to manage frequency    Review of patient's allergies indicates:  No Known Allergies     Past Medical History:   Diagnosis Date    Arthritis     legs    Bacteremia due to Gram-negative bacteria 3/23/2021    Diabetes mellitus     Diabetes mellitus, type 2     Hyperlipidemia     Hypertension     Osteomyelitis     Palliative care encounter 5/24/2023     Past Surgical History:   Procedure Laterality Date    ABOVE-KNEE AMPUTATION Left 3/27/2024    Procedure: AMPUTATION, ABOVE KNEE;  Surgeon: Adal Arellano MD;  Location: [facility] OR [address] [place] [address];  Service: Vascular;  Laterality: Left;    ANGIOGRAPHY OF LOWER EXTREMITY N/A 2/3/2021    Procedure: Angiogram Extremity Bilateral;  Surgeon: Ernst Chacko MD;  Location: [facility] OR [address] [place] [address];  Service: Peripheral Vascular;  Laterality: N/A;  7.4 mintues fluoroscopy time  816.15 mGy  170.17 Gycm2    AORTOGRAPHY WITH EXTREMITY RUNOFF Bilateral 2/3/2021    Procedure: AORTOGRAM, WITH EXTREMITY RUNOFF;  Surgeon: Ernst Chacko MD;  Location: [facility] OR [address] [place] [address];  Service: Peripheral Vascular;  Laterality: Bilateral;    DEBRIDEMENT OF FOOT Left 2/23/2021    Procedure: DEBRIDEMENT, LEFT HEEL;  Surgeon: Mayra Schroeder DPM;  Location: [facility] OR [address] [place] [address];  Service: Podiatry;  Laterality: Left;    ESOPHAGOGASTRODUODENOSCOPY N/A 6/28/2024    Procedure: EGD (ESOPHAGOGASTRODUODENOSCOPY);  Surgeon: Adal Chandra MD;  Location: [facility] ENDO;  Service: Gastroenterology;  Laterality: N/A;    FOOT AMPUTATION  October 2010    left high midfoot amputation    IMPLANTATION OF LEADLESS PACEMAKER N/A 10/12/2023    Procedure: [id number]-ANHHQHPM;  Surgeon: VANESSA Delatorre MD;  Location: [facility] EP LAB;  Service: Cardiology;  Laterality: N/A;  AVB, MICRA, EH, ANES, [place]  [id number]       Family History       Problem Relation (Age of Onset)    Cancer Brother    Diabetes Mother, Sister    Heart disease Mother    Stroke Sister          Tobacco Use    Smoking status: Never    Smokeless tobacco: Never   Substance and Sexual Activity    Alcohol use: No     Comment: occassional    Drug use: No    Sexual activity: Not Currently     Review of Systems   Constitutional:  Negative for chills and fever.   Gastrointestinal:  Negative for nausea and vomiting.     Objective:     Vital Signs (Most Recent):  Temp: 98.8 °F (37.1 °C) (09/30/24 1556)  Pulse: 72 (09/30/24 1556)  Resp: 18 (09/30/24 1556)  BP: (!) 111/53 (09/30/24 1556)  SpO2: 100 % (09/30/24 1556) Vital Signs (24h Range):  Temp:  [97.8 °F (36.6 °C)-99.6 °F (37.6 °C)] 98.8 °F (37.1 °C)  Pulse:  [30-94] 72  Resp:  [11-21] 18  SpO2:  [97 %-100 %] 100 %  BP: (101-140)/(50-65) 111/53     Weight: 83.4 kg (183 lb 13.8 oz)  Body mass index is 27.15 kg/m².    Foot Exam    General  Affect: appropriate   Gait: (non ambulatory)      Right Foot/Ankle     Inspection and Palpation  Ecchymosis: none  Tenderness: none   Swelling: none   Skin Exam: dry skin, skin changes, ulcer (posterior heel) and erythema; skin not intact     Neurovascular  Dorsalis pedis: doppler  Posterior tibial: doppler  Saphenous nerve sensation: absent  Tibial nerve sensation: absent  Superficial peroneal nerve sensation: absent  Deep peroneal nerve sensation: absent  Sural nerve sensation: absent    Comments  Large Right posterior heel wound with osseous granular base, natalie wound dry eschar, malodor. Without drainage, purulence, bleeding. Probes to bone. Diffusely xerotic.    Left Foot/Ankle      Comments  S/p AKA    Clinical media:        Laboratory:  A1C:   Recent Labs   Lab 06/26/24  1200 07/25/24  2115   HGBA1C 6.8* 6.0*     CBC:   Recent Labs   Lab 09/30/24  1200   WBC 14.14*   RBC 2.88*   HGB 7.3*   HCT 23.8*   *   MCV 83   MCH 25.3*   MCHC 30.7*     CMP:   Recent Labs    Lab 09/30/24  0615   *   CALCIUM 8.2*   ALBUMIN 1.2*   PROT 5.8*      K 4.5   CO2 24      BUN 29*   CREATININE 0.9   ALKPHOS 87   ALT 27   AST 19   BILITOT 1.3*     CRP:   Recent Labs   Lab 09/30/24  0615   .6*     Wound Cultures:   Recent Labs   Lab 07/01/24  1241   LABAERO METHICILLIN RESISTANT STAPHYLOCOCCUS AUREUS  Few  *  PSEUDOMONAS AERUGINOSA   Few  *       Diagnostic Results:  I have reviewed all pertinent imaging results/findings within the past 24 hours.  Assessment/Plan:     ID  * Osteomyelitis  Chronic ulceration of R posterior heel with possible acute on chronic Osteomyelitis of calcaneus. WBC elevated. VSS, afebrile. Radiographs demonstrate underlying or chronic OM of R calcaneus, possible infectious process at R distal 5th metatarsal. R heel wound probes to bone, clinically stable. Wound care consulted. MRI pending.     Plan:  - Physical exam and diagnostic findings discussed with patient  - No surgical intervention at this time  - Agree with wound care recommendations  - Right heel wound dressed by wound care team: Hydrofera blue and mepilex border.   - MRI ordered  - Abx per primary  - Podiatry to follow        Thank you for your consult. I will follow-up with patient. Please contact us if you have any additional questions.    Jovanni Stahl MD  Podiatry  [facility] - [facility]down [facility] ([place])

## 2024-09-30 NOTE — ASSESSMENT & PLAN NOTE
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 General surgery consulted for evaluations of multiple wounds.    - left hip wound with necrotic edges with fibrinous exudate. General surgery will perform bedside debridement tomorrow 10/1  - sacral wound with minimal fibrinous exudate. Will clean up as needed during debridement of hip tomorrow   - continue local wound wet-to-dry dressings   - eliquis currently being held by primary team. ok for DVT ppx

## 2024-09-30 NOTE — ASSESSMENT & PLAN NOTE
Chronic, uncontrolled. Latest blood pressure and vitals reviewed-     Temp:  [98.7 °F (37.1 °C)-99 °F (37.2 °C)]   Pulse:  []   Resp:  [11-21]   BP: ()/(50-71)   SpO2:  [95 %-100 %] .   Home meds for hypertension were reviewed and noted below.   Hypertension Medications               furosemide (LASIX) 20 MG tablet Take 1 tablet (20 mg total) by mouth once daily.    NIFEdipine (PROCARDIA-XL) 90 MG (OSM) 24 hr tablet Take 1 tablet (90 mg total) by mouth once daily.            While in the hospital, will manage blood pressure as follows; Adjust home antihypertensive regimen as follows- home home Nifedipine    Will utilize p.r.n. blood pressure medication only if patient's blood pressure greater than 180/110 and he develops symptoms such as worsening chest pain or shortness of breath.     - hold diuretics, no signs of volume overload

## 2024-09-30 NOTE — HPI
Mr. 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  General surgery consulted for evaluation of wounds for possible debridement.

## 2024-09-30 NOTE — PLAN OF CARE
Problem: Skin Injury Risk Increased  Goal: Skin Health and Integrity  Outcome: Progressing     Problem: Adult Inpatient Plan of Care  Goal: Plan of Care Review  Outcome: Progressing  Goal: Patient-Specific Goal (Individualized)  Outcome: Progressing  Goal: Absence of Hospital-Acquired Illness or Injury  Outcome: Progressing  Goal: Optimal Comfort and Wellbeing  Outcome: Progressing  Goal: Readiness for Transition of Care  Outcome: Progressing     Problem: Diabetes Comorbidity  Goal: Blood Glucose Level Within Targeted Range  Outcome: Progressing     Problem: Acute Kidney Injury/Impairment  Goal: Fluid and Electrolyte Balance  Outcome: Progressing  Goal: Improved Oral Intake  Outcome: Progressing  Goal: Effective Renal Function  Outcome: Progressing     Problem: Wound  Goal: Optimal Coping  Outcome: Progressing  Goal: Optimal Functional Ability  Outcome: Progressing  Goal: Absence of Infection Signs and Symptoms  Outcome: Progressing  Goal: Improved Oral Intake  Outcome: Progressing  Goal: Optimal Pain Control and Function  Outcome: Progressing  Goal: Skin Health and Integrity  Outcome: Progressing  Goal: Optimal Wound Healing  Outcome: Progressing     Problem: Infection  Goal: Absence of Infection Signs and Symptoms  Outcome: Progressing     Patient alert and oriented. Cooperative with care. Wound care nurses in this shift to see patient. Ultrasound also done this shift. Awaiting CT scan. Patient was NPO for possible gen surg but team has since added a diet. Patient currently off the floor

## 2024-09-30 NOTE — PLAN OF CARE
[facility] - [facility] ([place])  Initial Discharge Assessment      Discharge Plan A and Plan B have been determined by review of patient's clinical status, future medical and therapeutic needs, and coverage/benefits for post-acute care in coordination with multidisciplinary team members.      Primary Care Provider: Vinod Elise MD    Admission Diagnosis: Hypoalbuminemia [E88.09]  Thrombocytosis [D75.839]  Wound infection [T14.8XXA, L08.9]  Functional quadriplegia [R53.2]  Macrocytic anemia [D53.9]  Chest pain [R07.9]  Pressure injury of sacral region, stage 4 [L89.154]    Admission Date: 9/29/2024  Expected Discharge Date: 10/4/2024         Payor: [employer] MANAGED MEDICARE / Plan: [employer] HMO PPO SPECIAL NEEDS / Product Type: Medicare Advantage /     Extended Emergency Contact Information  Primary Emergency Contact: Kandy Castañeda   [place]  Mobile Phone: [phone]  Relation: Sister  Secondary Emergency Contact: January Nicholson   [place]  Mobile Phone: [phone]  Relation: Sister    Discharge Plan A: Return to nursing home ([facility] [phone])         [id number] - [place] - [address] AT [facility] [address]  [facility] [place] [address]  Phone: [phone] Fax: [phone]    [employer] [facility] [address] [place] [address]  [address] [place] [address]  Phone: [phone] Fax: [phone]           09/30/24 1246   Discharge Planning   Assessment Type Discharge Planning Brief Assessment   Resource/Environmental Concerns none   Support Systems Family members;Children   Assistance Needed total care   Equipment Currently Used at Home glucometer;wheelchair;lift device;other (see comments)  (HME used at the Nursing home)   Current Living Arrangements residential facility   Care Facility Name [facility] [phone]   Patient/Family Anticipates Transition to long-term care facility    Patient/Family Anticipated Services at Transition none   DME Needed Upon Discharge  other (see comments)  (TBD)   Discharge Plan A Return to nursing home  ([facility] [phone])   Physical Activity   On average, how many days per week do you engage in moderate to strenuous exercise (like a brisk walk)? 3 days   On average, how many minutes do you engage in exercise at this level? 20 min   Financial Resource Strain   How hard is it for you to pay for the very basics like food, housing, medical care, and heating? Not hard   Housing Stability   In the last 12 months, was there a time when you were not able to pay the mortgage or rent on time? N   At any time in the past 12 months, were you homeless or living in a shelter (including now)? N   Transportation Needs   Has the lack of transportation kept you from medical appointments, meetings, work or from getting things needed for daily living? No   Food Insecurity   Within the past 12 months, you worried that your food would run out before you got the money to buy more. Never true   Within the past 12 months, the food you bought just didn't last and you didn't have money to get more. Never true   Stress   Do you feel stress - tense, restless, nervous, or anxious, or unable to sleep at night because your mind is troubled all the time - these days? To some exte   Social Isolation   How often do you feel lonely or isolated from those around you?  Never   Alcohol Use   Q1: How often do you have a drink containing alcohol? Never   Q2: How many drinks containing alcohol do you have on a typical day when you are drinking? None   Q3: How often do you have six or more drinks on one occasion? Never   [employer]   In the past 12 months has the electric, gas, oil, or water company threatened to shut off services in your home? No   Health Literacy   How often do you need to have someone help you when you read instructions, pamphlets, or other written material from your doctor or  pharmacy? Never          CM spoke  with patients sister via phone to complete discharge planning assessment.  Patient alert and oriented xs 1-2  Patients sister  verified all demographic information on facesheet is correct.  Patients sister  verified PCP is Dr Vinod Elise   Patients sister  verified primary health insurance is [employer] and [place] Medicaid. And has listed DME.  Patient with NO POA or Living Will.  Patient not on dialysis or medication coumadin. Patient is currently prescribed Eliquis 5 mg BIS  Patient with no 30 day admission.  Patient with no financial issues at this time.  Patient  will need transportation upon discharge from facility.  Patient will have assistance from staff at [facility] upon discharge Patient  dependent with ADLs.  All questions answered regarding Case Management Discharge Planning, patient verbalized understanding.  Discharge booklet with CM's contact information given to patient.    1245 pm  CM spoke with Bryce at [facility] Phone: [phone] is willing to accept and patient will not need a COVID or ppd placement       Keerthi Alex RN  Case Management  [facility]  [phone]

## 2024-09-30 NOTE — SUBJECTIVE & OBJECTIVE
Interval History: General surgery consulted for wound debridement. NPO tonight. Podiatry following for history of R foot osteo. MRI ordered.     Review of Systems  Objective:     Vital Signs (Most Recent):  Temp: 98.8 °F (37.1 °C) (09/30/24 1556)  Pulse: 72 (09/30/24 1556)  Resp: 18 (09/30/24 1556)  BP: (!) 111/53 (09/30/24 1556)  SpO2: 100 % (09/30/24 1700) Vital Signs (24h Range):  Temp:  [97.8 °F (36.6 °C)-99.6 °F (37.6 °C)] 98.8 °F (37.1 °C)  Pulse:  [30-94] 72  Resp:  [11-20] 18  SpO2:  [97 %-100 %] 100 %  BP: (101-140)/(50-65) 111/53     Weight: 83.4 kg (183 lb 13.8 oz)  Body mass index is 27.15 kg/m².    Intake/Output Summary (Last 24 hours) at 9/30/2024 1752  Last data filed at 9/30/2024 0604  Gross per 24 hour   Intake 1609.01 ml   Output 1100 ml   Net 509.01 ml         Physical Exam  Constitutional:       General: He is not in acute distress.     Appearance: He is ill-appearing. He is not diaphoretic.   HENT:      Head: Normocephalic and atraumatic.      Right Ear: External ear normal.      Left Ear: External ear normal.      Mouth/Throat:      Mouth: Mucous membranes are dry.   Eyes:      General:         Right eye: No discharge.         Left eye: No discharge.      Extraocular Movements: Extraocular movements intact.   Cardiovascular:      Rate and Rhythm: Normal rate. Rhythm irregular.      Heart sounds: No murmur heard.  Pulmonary:      Effort: Pulmonary effort is normal. No respiratory distress.      Breath sounds: No wheezing.   Abdominal:      General: Abdomen is flat. There is no distension.      Tenderness: There is abdominal tenderness (suprapubic tenderness, and RLQ tenderness).   Musculoskeletal:      Cervical back: Normal range of motion.      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 Surgical scar well-healed  Right lower extremity with cracked, dry skin as well as right heel wound  Multiple areas of stage 3-4 pressure ulcers noted to sacrum   Right upper erosion measuring 4x3x2 is  noted to significant depth without purulent drainage  Left upper sacrum reveals 2x3x2 irregular-edged wound with brown, foul-smelling purulent drainage       Skin:     General: Skin is dry.   Neurological:      General: No focal deficit present.      Mental Status: He is alert.             Significant Labs: All pertinent labs within the past 24 hours have been reviewed.    Significant Imaging: I have reviewed all pertinent imaging results/findings within the past 24 hours.

## 2024-09-30 NOTE — ASSESSMENT & PLAN NOTE
Pt has failed multiple void trials in the past, now with chronic fung. Most recently replaced 9/29. C/f UTI  UA shows +1 blood, 3+ leuk esterase, >100 WBCs and few bacteria, patient reports episode of dysuria yesterday and suprapubic pain on palpation   - culture pending   - last Fung change today

## 2024-09-30 NOTE — HPI
Saji Castañeda is a 74 y.o. male with a PMH of T2DM, PVD, left AKA, Chronic Multifactorial Anemia, BPH s/p Chronic Sams, paroxysmal A-fib on eliquis, CKD3, Multifactorial HTN, HLD and Osteomyelitis of R foot presenting from [facility] with suspected infection of L hip wound. He is nonambulatory. Podiatry consulted for R foot wound.    WBC elevated. VSS, afebrile. Radiographs demonstrate underlying or chronic OM of R calcaneus, possible infectious process at R distal 5th metatarsal.

## 2024-09-30 NOTE — HOSPITAL COURSE
74M admitted with suspected infection of sacral wound. Started on Vanc and Zosyn given prior history of prior susceptibilities. General surgery debrided his wound on 10/1. Wound care and podiatry following. X-ray of right foot c/f chronic osteomyelitis. MRI with acute osteomyelitis of the posterior 2/3 of the calcaneus and early osteomyelitis of the lateral malleolus and 1st distal phalanx. R heel bone biopsy and debridement done by podiatry on 10/3. R foot bone culture positive for staph caprae and pseudomonas, Proteus on sacral culture. Currently on daptomycin and zosyn. PICC line placed. Planning for discharge on IV abx.

## 2024-09-30 NOTE — CONSULTS
"[facility] ([place] [id number])  General Surgery  Consult Note    Patient Name: Saji Castañeda  MRN: [id number]  Code Status: Full Code  Admission Date: 9/29/2024  Hospital Length of Stay: 0 days  Attending Physician: Jm Rosa MD  Primary Care Provider: Vinod Elise MD    Patient information was obtained from patient and past medical records.     Inpatient consult to General Surgery  Consult performed by: Lissett Morales MD  Consult ordered by: Anita Thomas MD        Subjective:     Principal Problem: Osteomyelitis    History of Present Illness: Mr. 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  General surgery consulted for evaluation of wounds for possible debridement.       No current facility-administered medications on file prior to encounter.     Current Outpatient Medications on File Prior to Encounter   Medication Sig    acetaminophen (TYLENOL) 325 MG tablet Take 650 mg by mouth every 6 (six) hours as needed for Pain.    apixaban (ELIQUIS) 5 mg Tab Take 1 tablet (5 mg total) by mouth 2 (two) times daily.    ascorbic acid, vitamin C, (VITAMIN C) 500 MG tablet Take 500 mg by mouth 2 (two) times daily.    BD AUTOSHIELD DUO PEN NEEDLE 30 gauge x 3/16" Ndle     BD ULTRA-FINE ADITYA PEN NEEDLE 32 gauge x 5/32" Ndle USE FOUR TIMES DAILY WITH MEALS AND NIGHTLY    blood sugar diagnostic (CONTOUR TEST STRIPS) Strp 1 strip by Misc.(Non-Drug; Combo Route) route 4 (four) times daily. Use to check blood glucose 4x daily    diphenhydrAMINE (BENADRYL) 25 mg capsule Take 1 capsule (25 mg total) by mouth every 6 (six) hours as needed for Itching.    ergocalciferol (ERGOCALCIFEROL) 50,000 unit Cap Take 1 capsule (50,000 Units " total) by mouth Every Friday. for 10 doses    ferrous sulfate 325 (65 FE) MG EC tablet Take 325 mg by mouth 2 (two) times daily.    furosemide (LASIX) 20 MG tablet Take 1 tablet (20 mg total) by mouth once daily.    gabapentin (NEURONTIN) 300 MG capsule Take 300 mg by mouth 2 (two) times daily.    insulin aspart U-100 (NOVOLOG) 100 unit/mL (3 mL) InPn pen Inject 5 Units into the skin 3 (three) times daily with meals.    insulin aspart U-100 (NOVOLOG) 100 unit/mL injection Inject 2-10 Units into the skin 3 (three) times daily before meals. 0-149=0  150-200: 2 units  201-250: 4 units  251-300: 6 units  301-350: 8 units  351-1000=10 units, NOTIFY MD    lancets (MICROLET LANCET) Misc 1 each by Misc.(Non-Drug; Combo Route) route 4 (four) times daily. Use to check blood sugars 4x daily    LANTUS SOLOSTAR U-100 INSULIN 100 unit/mL (3 mL) InPn pen Inject 12 Units into the skin 2 (two) times a day.    menthol-zinc oxide (CALMOSEPTINE) 0.44-20.6 % Oint Apply topically as needed.    multivitamin (THERAGRAN) per tablet Take 1 tablet by mouth once daily.    NIFEdipine (PROCARDIA-XL) 90 MG (OSM) 24 hr tablet Take 1 tablet (90 mg total) by mouth once daily.    oxyCODONE (ROXICODONE) 5 MG immediate release tablet Take 1 tablet (5 mg total) by mouth every 24 hours as needed for Pain.    pantoprazole (PROTONIX) 40 MG tablet Take 40 mg by mouth once daily. Before breakfast    potassium chloride SA (K-DUR,KLOR-CON) 20 MEQ tablet Take 20 mEq by mouth 2 (two) times daily.    povidone-iodine (BETADINE) 10 % external solution Apply topically as needed for Wound Care.    SANTYL ointment Apply topically once daily. unknown    tamsulosin (FLOMAX) 0.4 mg Cap Take 0.4 mg by mouth once daily.    TRUE METRIX GLUCOSE METER Misc     zinc sulfate (ZINCATE) 50 mg zinc (220 mg) capsule Take 220 mg by mouth every morning.    [DISCONTINUED] hydroCHLOROthiazide (HYDRODIURIL) 25 MG tablet Take 0.5 tablets (12.5 mg total) by mouth every Mon, Wed, Fri.  Beginning on 7/4/2022       Review of patient's allergies indicates:  No Known Allergies    Past Medical History:   Diagnosis Date    Arthritis     legs    Bacteremia due to Gram-negative bacteria 3/23/2021    Diabetes mellitus     Diabetes mellitus, type 2     Hyperlipidemia     Hypertension     Osteomyelitis     Palliative care encounter 5/24/2023     Past Surgical History:   Procedure Laterality Date    ABOVE-KNEE AMPUTATION Left 3/27/2024    Procedure: AMPUTATION, ABOVE KNEE;  Surgeon: Adal Arellano MD;  Location: [facility] OR [address] [place] [address];  Service: Vascular;  Laterality: Left;    ANGIOGRAPHY OF LOWER EXTREMITY N/A 2/3/2021    Procedure: Angiogram Extremity Bilateral;  Surgeon: Ernst Chacko MD;  [facility];  Service: Peripheral Vascular;  Laterality: N/A;  7.4 mintues fluoroscopy time  816.15 mGy  170.17 Gycm2    AORTOGRAPHY WITH EXTREMITY RUNOFF Bilateral 2/3/2021    Procedure: AORTOGRAM, WITH EXTREMITY RUNOFF;  Surgeon: Ernst Chacko MD;  [facility];  Service: Peripheral Vascular;  Laterality: Bilateral;    DEBRIDEMENT OF FOOT Left 2/23/2021    Procedure: DEBRIDEMENT, LEFT HEEL;  Surgeon: Mayra Schroeder DPM;  [facility];  Service: Podiatry;  Laterality: Left;    ESOPHAGOGASTRODUODENOSCOPY N/A 6/28/2024    Procedure: EGD (ESOPHAGOGASTRODUODENOSCOPY);  Surgeon: Adal Chandra MD;  Location: [facility];  Service: Gastroenterology;  Laterality: N/A;    FOOT AMPUTATION  October 2010    left high midfoot amputation    IMPLANTATION OF LEADLESS PACEMAKER N/A 10/12/2023    Procedure: [id number];  Surgeon: VANESSA Delatorre MD;  Location: [facility] EP LAB;  Service: Cardiology;  Laterality: N/A;  AVB, MICRA, EH, [place], [place] [id number]     Family History       Problem Relation (Age of Onset)    Cancer Brother    Diabetes Mother, Sister    Heart disease Mother    Stroke Sister          Tobacco Use    Smoking status: Never     Smokeless tobacco: Never   Substance and Sexual Activity    Alcohol use: No     Comment: occassional    Drug use: No    Sexual activity: Not Currently     Review of Systems   Constitutional:  Negative for chills and fever.     Objective:     Vital Signs (Most Recent):  Temp: 98.8 °F (37.1 °C) (09/30/24 1556)  Pulse: 72 (09/30/24 1556)  Resp: 18 (09/30/24 1757)  BP: (!) 111/53 (09/30/24 1556)  SpO2: 100 % (09/30/24 1700) Vital Signs (24h Range):  Temp:  [97.8 °F (36.6 °C)-99.6 °F (37.6 °C)] 98.8 °F (37.1 °C)  Pulse:  [30-94] 72  Resp:  [11-20] 18  SpO2:  [97 %-100 %] 100 %  BP: (101-140)/(50-65) 111/53     Weight: 83.4 kg (183 lb 13.8 oz)  Body mass index is 27.15 kg/m².     Physical Exam  Vitals and nursing note reviewed.   Constitutional:       General: He is not in acute distress.     Appearance: He is ill-appearing.   HENT:      Head: Normocephalic and atraumatic.   Cardiovascular:      Rate and Rhythm: Normal rate.   Pulmonary:      Effort: No respiratory distress.   Musculoskeletal:      Comments: L AKA stump   Skin:     General: Skin is warm.      Comments: Left hip wound with necrotic edges and fibrinous material     Sacral wound with minimal fibrinous exudate. Clean wound base   Neurological:      Mental Status: He is alert.            I have reviewed all pertinent lab results within the past 24 hours.  CBC:   Recent Labs   Lab 09/30/24  1200   WBC 14.14*   RBC 2.88*   HGB 7.3*   HCT 23.8*   *   MCV 83   MCH 25.3*   MCHC 30.7*       Significant Diagnostics:  I have reviewed all pertinent imaging results/findings within the past 24 hours.    Assessment/Plan:     Wounds, multiple  Saji Cecilton 73 yo male     Wound of left leg  Saji Garry 73 yo male PMHx HTN, HLD, T2DM on long-term insulin, BPH with chronic Sams in place, paroxysmal A-fib (on eliquis), CHB s/p PPM medtronic (10/12/2023), CKD3, R foot osteomyelitis and PVD with diabetic ulcer s/p left AKA (3/27/2024). General surgery consulted for  evaluations of multiple wounds.    - left hip wound with necrotic edges with fibrinous exudate. General surgery will perform bedside debridement tomorrow 10/1  - sacral wound with minimal fibrinous exudate. Will clean up as needed during debridement of hip tomorrow   - continue local wound wet-to-dry dressings   - eliquis currently being held by primary team. ok for DVT ppx        Thank you for your consult. I will follow-up with patient. Please contact us if you have any additional questions.      Lissett Morales MD  General Surgery  [facility] ([place])

## 2024-09-30 NOTE — ASSESSMENT & PLAN NOTE
Hb A1c on 7/25 was 6  - Lantus 12 units BID  - continue LDSSI   - POCT glucose checks before meals and nightly

## 2024-09-30 NOTE — ASSESSMENT & PLAN NOTE
Left hip wound with purulent foul smelling drainage    - Wound cx ordered, f/u results  - On Vanc/Zosyn

## 2024-09-30 NOTE — PROGRESS NOTES
"Pharmacokinetic Initial Assessment & Plan: IV Vancomycin    IV Vancomycin 2500 mg x once given in the ED on 09/29 @ 1639  Then Vancomycin 1750 mg every 12 hours  Obtain a Vancomycin trough 30 mins prior to the 4 th dose on 10/01 @ 0600  Desired empiric serum trough concentration is 10 to 20 mcg/mL    Pharmacy will continue to follow and monitor vancomycin.    [id number] with any questions regarding this assessment.     Thank you for the consult,   Darwin Benito        Patient brief summary:  Saji Castañeda is a 74 y.o. male initiated on antimicrobial therapy with IV Vancomycin for treatment of suspected skin & soft tissue infection    Drug Allergies:   Review of patient's allergies indicates:  No Known Allergies    Actual Body Weight:   97.1 kg    Renal Function:   Estimated Creatinine Clearance: 66.7 mL/min (based on SCr of 1.1 mg/dL).,     CBC (last 72 hours):  Recent Labs   Lab Result Units 09/29/24  1446   WBC K/uL 13.66*   Hemoglobin g/dL 6.3*   Hematocrit % 21.3*   Platelets K/uL 690*   Gran % % 82.7*   Lymph % % 8.1*   Mono % % 3.8*   Eosinophil % % 4.7   Basophil % % 0.1   Differential Method  Automated       Metabolic Panel (last 72 hours):  Recent Labs   Lab Result Units 09/29/24  1446 09/29/24  1722   Sodium mmol/L 138  --    Potassium mmol/L 5.0  --    Chloride mmol/L 109  --    CO2 mmol/L 21*  --    Glucose mg/dL 156*  --    Glucose, UA   --  Negative   BUN mg/dL 31*  --    Creatinine mg/dL 1.1  --    Albumin g/dL 1.3*  --    Total Bilirubin mg/dL 0.1  --    Alkaline Phosphatase U/L 86  --    AST U/L 35  --    ALT U/L 33  --    Magnesium mg/dL 1.7  --    Phosphorus mg/dL 3.3  --        Drug levels (last 3 results):  No results for input(s): "VANCOMYCINRA", "VANCORANDOM", "VANCOMYCINPE", "VANCOPEAK", "VANCOMYCINTR", "VANCOTROUGH" in the last 72 hours.    Microbiologic Results:  Microbiology Results (last 7 days)       Procedure Component Value Units Date/Time    Urine culture [[id number]] Collected: 09/29/24 " 1722    Order Status: No result Specimen: Urine Updated: 09/29/24 1807    Aerobic culture (Specify Source) **CANNOT BE ORDERED AS STAT* [[id number]] Collected: 09/29/24 1628    Order Status: Sent Specimen: Decubitus from Buttocks, Left Updated: 09/29/24 1650    Blood culture x two cultures. Draw prior to antibiotics. [[id number]] Collected: 09/29/24 1446    Order Status: Sent Specimen: Blood from Peripheral, Hand, Left Updated: 09/29/24 1458    Blood culture x two cultures. Draw prior to antibiotics. [[id number]] Collected: 09/29/24 1446    Order Status: Sent Specimen: Blood from Peripheral, Antecubital, Right Updated: 09/29/24 1458

## 2024-10-01 ENCOUNTER — DOCUMENTATION ONLY (OUTPATIENT)
Dept: CARDIOLOGY | Facility: HOSPITAL | Age: 74
End: 2024-10-01
Payer: MEDICARE

## 2024-10-01 VITALS — HEART RATE: 61 BPM

## 2024-10-01 LAB
ALBUMIN SERPL BCP-MCNC: 1.2 G/DL (ref 3.5–5.2)
ALP SERPL-CCNC: 234 U/L (ref 55–135)
ALT SERPL W/O P-5'-P-CCNC: 28 U/L (ref 10–44)
ANION GAP SERPL CALC-SCNC: 7 MMOL/L (ref 8–16)
AST SERPL-CCNC: 25 U/L (ref 10–40)
BASOPHILS # BLD AUTO: 0.05 K/UL (ref 0–0.2)
BASOPHILS NFR BLD: 0.4 % (ref 0–1.9)
BILIRUB SERPL-MCNC: 0.2 MG/DL (ref 0.1–1)
BUN SERPL-MCNC: 24 MG/DL (ref 8–23)
CALCIUM SERPL-MCNC: 8.6 MG/DL (ref 8.7–10.5)
CHLORIDE SERPL-SCNC: 105 MMOL/L (ref 95–110)
CO2 SERPL-SCNC: 25 MMOL/L (ref 23–29)
CREAT SERPL-MCNC: 1 MG/DL (ref 0.5–1.4)
DIFFERENTIAL METHOD BLD: ABNORMAL
EOSINOPHIL # BLD AUTO: 0.7 K/UL (ref 0–0.5)
EOSINOPHIL NFR BLD: 5.4 % (ref 0–8)
ERYTHROCYTE [DISTWIDTH] IN BLOOD BY AUTOMATED COUNT: 16.4 % (ref 11.5–14.5)
EST. GFR  (NO RACE VARIABLE): >60 ML/MIN/1.73 M^2
GLUCOSE SERPL-MCNC: 118 MG/DL (ref 70–110)
HCT VFR BLD AUTO: 23.7 % (ref 40–54)
HGB BLD-MCNC: 7.1 G/DL (ref 14–18)
IMM GRANULOCYTES # BLD AUTO: 0.06 K/UL (ref 0–0.04)
IMM GRANULOCYTES NFR BLD AUTO: 0.4 % (ref 0–0.5)
LYMPHOCYTES # BLD AUTO: 0.9 K/UL (ref 1–4.8)
LYMPHOCYTES NFR BLD: 6.4 % (ref 18–48)
MAGNESIUM SERPL-MCNC: 1.9 MG/DL (ref 1.6–2.6)
MCH RBC QN AUTO: 24.9 PG (ref 27–31)
MCHC RBC AUTO-ENTMCNC: 30 G/DL (ref 32–36)
MCV RBC AUTO: 83 FL (ref 82–98)
MONOCYTES # BLD AUTO: 0.5 K/UL (ref 0.3–1)
MONOCYTES NFR BLD: 3.8 % (ref 4–15)
NEUTROPHILS # BLD AUTO: 11.2 K/UL (ref 1.8–7.7)
NEUTROPHILS NFR BLD: 83.6 % (ref 38–73)
NRBC BLD-RTO: 0 /100 WBC
PHOSPHATE SERPL-MCNC: 3.7 MG/DL (ref 2.7–4.5)
PLATELET # BLD AUTO: 569 K/UL (ref 150–450)
PMV BLD AUTO: 8.4 FL (ref 9.2–12.9)
POCT GLUCOSE: 115 MG/DL (ref 70–110)
POCT GLUCOSE: 124 MG/DL (ref 70–110)
POCT GLUCOSE: 157 MG/DL (ref 70–110)
POCT GLUCOSE: 189 MG/DL (ref 70–110)
POTASSIUM SERPL-SCNC: 4.2 MMOL/L (ref 3.5–5.1)
PROT SERPL-MCNC: 5.9 G/DL (ref 6–8.4)
RBC # BLD AUTO: 2.85 M/UL (ref 4.6–6.2)
SODIUM SERPL-SCNC: 137 MMOL/L (ref 136–145)
VANCOMYCIN SERPL-MCNC: 22 UG/ML
VANCOMYCIN TROUGH SERPL-MCNC: 27.8 UG/ML (ref 10–22)
WBC # BLD AUTO: 13.44 K/UL (ref 3.9–12.7)

## 2024-10-01 PROCEDURE — 25000003 PHARM REV CODE 250: Mod: HCNC

## 2024-10-01 PROCEDURE — 63600175 PHARM REV CODE 636 W HCPCS: Mod: HCNC

## 2024-10-01 PROCEDURE — 84100 ASSAY OF PHOSPHORUS: CPT | Mod: HCNC

## 2024-10-01 PROCEDURE — 80053 COMPREHEN METABOLIC PANEL: CPT | Mod: HCNC

## 2024-10-01 PROCEDURE — 0JDM0ZZ EXTRACTION OF LEFT UPPER LEG SUBCUTANEOUS TISSUE AND FASCIA, OPEN APPROACH: ICD-10-PCS | Performed by: SURGERY

## 2024-10-01 PROCEDURE — 83735 ASSAY OF MAGNESIUM: CPT | Mod: HCNC

## 2024-10-01 PROCEDURE — 27000207 HC ISOLATION: Mod: HCNC

## 2024-10-01 PROCEDURE — 80202 ASSAY OF VANCOMYCIN: CPT | Mod: 91,HCNC | Performed by: STUDENT IN AN ORGANIZED HEALTH CARE EDUCATION/TRAINING PROGRAM

## 2024-10-01 PROCEDURE — 85025 COMPLETE CBC W/AUTO DIFF WBC: CPT | Mod: HCNC

## 2024-10-01 PROCEDURE — 36415 COLL VENOUS BLD VENIPUNCTURE: CPT | Mod: HCNC | Performed by: STUDENT IN AN ORGANIZED HEALTH CARE EDUCATION/TRAINING PROGRAM

## 2024-10-01 PROCEDURE — 20600001 HC STEP DOWN PRIVATE ROOM: Mod: HCNC

## 2024-10-01 PROCEDURE — 80202 ASSAY OF VANCOMYCIN: CPT | Mod: HCNC | Performed by: STUDENT IN AN ORGANIZED HEALTH CARE EDUCATION/TRAINING PROGRAM

## 2024-10-01 PROCEDURE — 0JD70ZZ EXTRACTION OF BACK SUBCUTANEOUS TISSUE AND FASCIA, OPEN APPROACH: ICD-10-PCS | Performed by: SURGERY

## 2024-10-01 RX ORDER — LIDOCAINE HYDROCHLORIDE 10 MG/ML
20 INJECTION, SOLUTION INFILTRATION; PERINEURAL ONCE
Status: COMPLETED | OUTPATIENT
Start: 2024-10-01 | End: 2024-10-01

## 2024-10-01 RX ADMIN — LIDOCAINE HYDROCHLORIDE 20 ML: 10 INJECTION, SOLUTION INFILTRATION; PERINEURAL at 12:10

## 2024-10-01 RX ADMIN — ENOXAPARIN SODIUM 40 MG: 40 INJECTION SUBCUTANEOUS at 05:10

## 2024-10-01 RX ADMIN — PIPERACILLIN SODIUM AND TAZOBACTAM SODIUM 4.5 G: 4; .5 INJECTION, POWDER, FOR SOLUTION INTRAVENOUS at 10:10

## 2024-10-01 RX ADMIN — PIPERACILLIN SODIUM AND TAZOBACTAM SODIUM 4.5 G: 4; .5 INJECTION, POWDER, FOR SOLUTION INTRAVENOUS at 06:10

## 2024-10-01 RX ADMIN — INSULIN GLARGINE 6 UNITS: 100 INJECTION, SOLUTION SUBCUTANEOUS at 08:10

## 2024-10-01 RX ADMIN — OXYCODONE HYDROCHLORIDE AND ACETAMINOPHEN 500 MG: 500 TABLET ORAL at 08:10

## 2024-10-01 RX ADMIN — OXYCODONE HYDROCHLORIDE 10 MG: 5 TABLET ORAL at 08:10

## 2024-10-01 RX ADMIN — OXYCODONE HYDROCHLORIDE 10 MG: 5 TABLET ORAL at 10:10

## 2024-10-01 RX ADMIN — GABAPENTIN 300 MG: 300 CAPSULE ORAL at 08:10

## 2024-10-01 RX ADMIN — PIPERACILLIN SODIUM AND TAZOBACTAM SODIUM 4.5 G: 4; .5 INJECTION, POWDER, FOR SOLUTION INTRAVENOUS at 02:10

## 2024-10-01 RX ADMIN — THERA TABS 1 TABLET: TAB at 08:10

## 2024-10-01 RX ADMIN — TAMSULOSIN HYDROCHLORIDE 0.4 MG: 0.4 CAPSULE ORAL at 08:10

## 2024-10-01 NOTE — ASSESSMENT & PLAN NOTE
R heal OM diagnosed during admission 6/26-7/11 with bone cx positive for pseudomonas and MRSA. Discharged on vanc and cefepime for 6 week course.  Recent admission 8/2-8/6, at that time Cefepime discontinued due to c/f Cefepime neurotoxicity, then developed Vanc YNES, and discharged on Daptomycin and Zosyn  - extensive grade 3-4 sacral wounds, L hip wound purulence  - chronic osteomyelitis of R heel with nonhealing ulcer    - Wound Cx from L buttocks, positive for proteus susceptibilities pending  - Blood Cx NGTD  - started on IV Vanc in the ED, recommend renally dosing  - continue Vanc 1750 mg q12h   - continue Zosyn  - Wound care consulted, recommended podiatry consult, providing   - WBC elevated 13.6, likely due to wound infection  - trend CBC and CMP  - procalcitonin 0.12  - continue oxycodone and tylenol PRN for pain  - per pharmacy, obtain vanc trough on 10/01 at 6:00 before giving 4th dose, range 10-20 mcg/mL  - podiatry consulted, and reported no surgical intervention at this time   - MRI ordered , c/f acute osteomyelitis of 2/3 calcaneus and early osteomyelitis of lateral malleolus & 1st distal phalanx

## 2024-10-01 NOTE — PLAN OF CARE
Problem: Skin Injury Risk Increased  Goal: Skin Health and Integrity  Outcome: Progressing     Problem: Adult Inpatient Plan of Care  Goal: Plan of Care Review  Outcome: Progressing  Goal: Patient-Specific Goal (Individualized)  Outcome: Progressing  Goal: Absence of Hospital-Acquired Illness or Injury  Outcome: Progressing  Goal: Optimal Comfort and Wellbeing  Outcome: Progressing  Goal: Readiness for Transition of Care  Outcome: Progressing     Problem: Diabetes Comorbidity  Goal: Blood Glucose Level Within Targeted Range  Outcome: Progressing     Problem: Acute Kidney Injury/Impairment  Goal: Fluid and Electrolyte Balance  Outcome: Progressing  Goal: Improved Oral Intake  Outcome: Progressing  Goal: Effective Renal Function  Outcome: Progressing     Problem: Wound  Goal: Optimal Coping  Outcome: Progressing  Goal: Optimal Functional Ability  Outcome: Progressing  Goal: Absence of Infection Signs and Symptoms  Outcome: Progressing  Goal: Improved Oral Intake  Outcome: Progressing  Goal: Optimal Pain Control and Function  Outcome: Progressing  Goal: Skin Health and Integrity  Outcome: Progressing  Goal: Optimal Wound Healing  Outcome: Progressing     Problem: Infection  Goal: Absence of Infection Signs and Symptoms  Outcome: Progressing

## 2024-10-01 NOTE — PLAN OF CARE
Problem: Skin Injury Risk Increased  Goal: Skin Health and Integrity  Outcome: Progressing     Problem: Adult Inpatient Plan of Care  Goal: Plan of Care Review  Outcome: Progressing  Goal: Patient-Specific Goal (Individualized)  Outcome: Progressing  Goal: Absence of Hospital-Acquired Illness or Injury  Outcome: Progressing  Goal: Optimal Comfort and Wellbeing  Outcome: Progressing  Goal: Readiness for Transition of Care  Outcome: Progressing     Problem: Diabetes Comorbidity  Goal: Blood Glucose Level Within Targeted Range  Outcome: Progressing     Problem: Acute Kidney Injury/Impairment  Goal: Fluid and Electrolyte Balance  Outcome: Progressing  Goal: Improved Oral Intake  Outcome: Progressing  Goal: Effective Renal Function  Outcome: Progressing     Problem: Wound  Goal: Optimal Coping  Outcome: Progressing  Goal: Optimal Functional Ability  Outcome: Progressing  Goal: Absence of Infection Signs and Symptoms  Outcome: Progressing  Goal: Improved Oral Intake  Outcome: Progressing  Goal: Optimal Pain Control and Function  Outcome: Progressing  Goal: Skin Health and Integrity  Outcome: Progressing  Goal: Optimal Wound Healing  Outcome: Progressing     Problem: Infection  Goal: Absence of Infection Signs and Symptoms  Outcome: Progressing     Patient alert and oriented. Cooperative with care. Medicated for pain as ordered. Continues on iv abx for infection. MRI of right foot done this shift. Debridement of hip and sacral area done as well at bedside with lidocaine. Patient tolerated well. Patient resting comfortably at this time

## 2024-10-01 NOTE — PROGRESS NOTES
[facility] - [facility]down [facility] ([place] [address])  [facility] Medicine  Progress Note    Patient Name: Saji Castañeda  MRN: [id number]  Patient Class: IP- Inpatient   Admission Date: 9/29/2024  Length of Stay: 1 days  Attending Physician: Jm Rosa MD  Primary Care Provider: Vinod Elise MD        Subjective:     Principal Problem:Osteomyelitis        HPI:  Saji Castañeda is a 74 y.o. 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 Arellano, Chronic Multifactorial Anemia, BPH s/p Chronic Sams, paroxysmal A-fib on eliquis, CHB s/p PPM medtronic (10/12/2023), CKD3, Multifactorial HTN, HLD and Osteomyelitis of R foot presenting from [facility] with suspected infection of L hip wound. He is nonambulatory . Patient has chronic, extensive sacral wounds, a R heel wound and a wound on the L hip with purulence and a foul odor. The patient was recommended to come to the ED due to concerns of wound infection by nursing staff at NH. Patient reports intermittent pain at the wound sites that is controlled with Oxycodone. Patient states that wound care manages these sites at the nursing home. Patient is alert, oriented and responsive to questioning however he is a poor historian regarding his medical history. Per Chart Review the patient was admitted to the ED on 7/25 for AMS while on Vanc and Cefepime for Osteomyelitis. Cefepime neurotoxicity was suspected and therapy was switched to Vanc and Zosyn. He then developed  He was transferred to [facility] at that time and had a pacemaker placed. Patient reports currently being on oral antibiotic therapy but is unsure what he is taking. He reports taking his medications daily that the nurse at NH provides. He reports never smoking or using drugs and no longer drinks alcohol.      Patient reports chronic Sams cath usage for unknown reason that was last changed today. He reports an episode of dysuria  yesterday and endorses suprapubic pain on palpation. He denies hematuria, urinary frequency/urgency, urinary odor or abdominal pain.      He also reports a mild cough and chills for the past several weeks with scant sputum production but denies fever, chest pain, SOB or recent illness. He reports chronic diaphoresis at night that requires him to change clothes in the morning.      Overview/Hospital Course:   74 y.o. 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 Arellano, Chronic Multifactorial Anemia, BPH s/p Chronic Sams, paroxysmal A-fib on eliquis, CHB s/p PPM medtronic (10/12/2023), CKD3, Multifactorial HTN, HLD and Osteomyelitis of R foot presenting from [facility] with suspected infection of sacral wound. Started on Vanc and Zosyn given prior history of prior susceptibilities. General surgery plans for wound debridement 10/1. Wound care and podiatry following. X-ray of right foot c/f chronic osteomyelitis. MRI with acute osteomyelitis of the posterior 2/3 of the calcaneus and early osteomyelitis of the lateral malleolus and 1st distal phalanx.    Interval History: NAEON. VSS. Patient reported continued pain 7/10 from sacral wounds. General surgery plans for debridement today.  MRIs showed acute osteomyelitis. Wound care and Podiatry following    Review of Systems   Constitutional:  Positive for appetite change (low).   HENT:  Negative for congestion.    Respiratory:  Positive for cough (with some mucous production). Negative for shortness of breath.    Cardiovascular:  Negative for chest pain and leg swelling.   Gastrointestinal:  Positive for abdominal pain. Negative for abdominal distention, constipation, diarrhea, nausea and vomiting.   Genitourinary:  Positive for dysuria. Negative for difficulty urinating.   Skin:  Positive for wound.     Objective:     Vital Signs (Most Recent):  Temp: 98.4 °F (36.9  °C) (10/01/24 1242)  Pulse: 70 (10/01/24 1242)  Resp: 16 (10/01/24 1242)  BP: (!) 119/55 (10/01/24 1242)  SpO2: 99 % (10/01/24 1242) Vital Signs (24h Range):  Temp:  [98.4 °F (36.9 °C)-98.9 °F (37.2 °C)] 98.4 °F (36.9 °C)  Pulse:  [61-85] 70  Resp:  [16-18] 16  SpO2:  [98 %-100 %] 99 %  BP: (104-122)/(53-68) 119/55     Weight: 83.4 kg (183 lb 13.8 oz)  Body mass index is 27.15 kg/m².    Intake/Output Summary (Last 24 hours) at 10/1/2024 1342  Last data filed at 10/1/2024 0504  Gross per 24 hour   Intake 220 ml   Output 900 ml   Net -680 ml         Physical Exam  Constitutional:       General: He is not in acute distress.     Appearance: He is ill-appearing. He is not diaphoretic.   HENT:      Head: Normocephalic and atraumatic.      Right Ear: External ear normal.      Left Ear: External ear normal.      Mouth/Throat:      Mouth: Mucous membranes are dry.   Eyes:      General:         Right eye: No discharge.         Left eye: No discharge.      Extraocular Movements: Extraocular movements intact.   Cardiovascular:      Rate and Rhythm: Normal rate. Rhythm irregular.      Heart sounds: No murmur heard.  Pulmonary:      Effort: Pulmonary effort is normal. No respiratory distress.      Breath sounds: No wheezing.   Abdominal:      General: Abdomen is flat. There is no distension.      Tenderness: There is abdominal tenderness (RLQ tenderness).   Musculoskeletal:      Cervical back: Normal range of motion.      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 Surgical scar well-healed  Right lower extremity with cracked, dry skin as well as right heel wound  Multiple areas of stage 3-4 pressure ulcers noted to sacrum   Right upper erosion measuring 4x3x2 is noted to significant depth without purulent drainage  Left upper sacrum reveals 2x3x2 irregular-edged wound with brown, foul-smelling purulent drainage       Skin:     General: Skin is dry.   Neurological:      General: No focal deficit present.      Mental  Status: He is alert.             Significant Labs: All pertinent labs within the past 24 hours have been reviewed.    Significant Imaging: I have reviewed all pertinent imaging results/findings within the past 24 hours.    Assessment/Plan:      * Osteomyelitis  R heal OM diagnosed during admission 6/26-7/11 with bone cx positive for pseudomonas and MRSA. Discharged on vanc and cefepime for 6 week course.  Recent admission 8/2-8/6, at that time Cefepime discontinued due to c/f Cefepime neurotoxicity, then developed Vanc YNES, and discharged on Daptomycin and Zosyn  - extensive grade 3-4 sacral wounds, L hip wound purulence  - chronic osteomyelitis of R heel with nonhealing ulcer    - Wound Cx from L buttocks, positive for proteus susceptibilities pending  - Blood Cx NGTD  - started on IV Vanc in the ED, recommend renally dosing  - continue Vanc 1750 mg q12h   - continue Zosyn  - Wound care consulted, recommended podiatry consult, providing   - WBC elevated 13.6, likely due to wound infection  - trend CBC and CMP  - procalcitonin 0.12  - continue oxycodone and tylenol PRN for pain  - per pharmacy, obtain vanc trough on 10/01 at 6:00 before giving 4th dose, range 10-20 mcg/mL  - podiatry consulted, and reported no surgical intervention at this time   - MRI ordered , c/f acute osteomyelitis of 2/3 calcaneus and early osteomyelitis of lateral malleolus & 1st distal phalanx    Anemia  Anemia is likely due to chronic disease due to Chronic Kidney Disease. Most recent hemoglobin and hematocrit are listed below.  Recent Labs     09/30/24  0042 09/30/24  1200 10/01/24  0249   HGB 7.2* 7.3* 7.1*   HCT 24.1* 23.8* 23.7*     Plan  - Monitor serial CBC: daily  - Transfuse PRBC if patient becomes hemodynamically unstable, symptomatic or H/H drops below 7/21.  - Patient has received 1 units of PRBCs on 9/29  - Patient's anemia is currently   7.1, below baseline 8-9  - Likely multifactorial with AOCD, as well as potentially GI bleed  vs  AVM. Continue to monitor  - H&H 6.3 and 21.6   - hold iron supplement   - guaic + stool test  - Elevated ferritin, anemia 2/2 chronic disease      Chronic indwelling Fung catheter  Pt has failed multiple void trials in the past, now with chronic fung. Most recently replaced 9/29. C/f UTI  UA shows +1 blood, 3+ leuk esterase, >100 WBCs and few bacteria, patient reports episode of dysuria yesterday and suprapubic pain on palpation   - Ucx, NGTD  - last Fung change today         Sacral wound  Left buttocks wound cx: proteus positive, susceptibles pending    Stage 3b chronic kidney disease  Creatine stable for now. BMP reviewed- noted Estimated Creatinine Clearance: 66.7 mL/min (based on SCr of 1.1 mg/dL). according to latest data. Based on current GFR, CKD stage is stage 3 - GFR 30-59.  Monitor UOP and serial BMP and adjust therapy as needed. Renally dose meds. Avoid nephrotoxic medications and procedures.    - hold nephrotoxic medications  - hold Lasix at this time   - renally dose Vanc    Paroxysmal atrial fibrillation  Patient with Paroxysmal (<7 days) atrial fibrillation which is uncontrolled. Patient is currently in atrial fibrillation.      echo on 7/2024 showed EF of 55-60% with no significant valvular abnormalities   - follows with Vascular Surgeon Dr. Banda  - hold home Lasix and HCTZ, not volume overloaded   - electrolytes stable, trend CMP  - hold home Eliquis due to unstable H&H, trend CBC  - monitor Afib, continuous telemetry     Wound of left leg  Left hip wound with purulent foul smelling drainage    - Wound cx ordered, f/u results  - On Vanc/Zosy  - Gen surg consulted for debridement , plans for 10/1          Type 2 diabetes mellitus, with long-term current use of insulin   Hb A1c on 7/25 was 6  - Lantus 12 units BID  - continue LDSSI   - POCT glucose checks before meals and nightly      Essential hypertension  Chronic, uncontrolled. Latest blood pressure and vitals reviewed-     Temp:  [98.4  °F (36.9 °C)-98.9 °F (37.2 °C)]   Pulse:  [61-85]   Resp:  [16-18]   BP: (104-122)/(53-68)   SpO2:  [98 %-100 %] .   Home meds for hypertension were reviewed and noted below.   Hypertension Medications               furosemide (LASIX) 20 MG tablet Take 1 tablet (20 mg total) by mouth once daily.    NIFEdipine (PROCARDIA-XL) 90 MG (OSM) 24 hr tablet Take 1 tablet (90 mg total) by mouth once daily.            While in the hospital, will manage blood pressure as follows; Adjust home antihypertensive regimen as follows- home home Nifedipine    Will utilize p.r.n. blood pressure medication only if patient's blood pressure greater than 180/110 and he develops symptoms such as worsening chest pain or shortness of breath.     - hold diuretics, no signs of volume overload           VTE Risk Mitigation (From admission, onward)           Ordered     enoxaparin injection 40 mg  Every 24 hours         09/30/24 1935     Reason for No Pharmacological VTE Prophylaxis  Once        Question:  Reasons:  Answer:  Risk of Bleeding  Comment:  c/f bleeding with drop in Hgb    09/29/24 1914     IP VTE HIGH RISK PATIENT  Once         09/29/24 1914     Place sequential compression device  Until discontinued         09/29/24 1914                    Discharge Planning   OXANA: 10/4/2024     Code Status: Full Code   Is the patient medically ready for discharge?:     Reason for patient still in hospital (select all that apply): Treatment  Discharge Plan A: Return to nursing home ([place] [phone])                  Anita Thomas MD  Department of Hospital Medicine   [facility] - [facility] ([place]-)

## 2024-10-01 NOTE — SUBJECTIVE & OBJECTIVE
Interval History: NAEON. VSS. Patient reported continued pain 7/10 from sacral wounds. General surgery plans for debridement today.  MRIs showed acute osteomyelitis. Wound care and Podiatry following    Review of Systems   Constitutional:  Positive for appetite change (low).   HENT:  Negative for congestion.    Respiratory:  Positive for cough (with some mucous production). Negative for shortness of breath.    Cardiovascular:  Negative for chest pain and leg swelling.   Gastrointestinal:  Positive for abdominal pain. Negative for abdominal distention, constipation, diarrhea, nausea and vomiting.   Genitourinary:  Positive for dysuria. Negative for difficulty urinating.   Skin:  Positive for wound.     Objective:     Vital Signs (Most Recent):  Temp: 98.4 °F (36.9 °C) (10/01/24 1242)  Pulse: 70 (10/01/24 1242)  Resp: 16 (10/01/24 1242)  BP: (!) 119/55 (10/01/24 1242)  SpO2: 99 % (10/01/24 1242) Vital Signs (24h Range):  Temp:  [98.4 °F (36.9 °C)-98.9 °F (37.2 °C)] 98.4 °F (36.9 °C)  Pulse:  [61-85] 70  Resp:  [16-18] 16  SpO2:  [98 %-100 %] 99 %  BP: (104-122)/(53-68) 119/55     Weight: 83.4 kg (183 lb 13.8 oz)  Body mass index is 27.15 kg/m².    Intake/Output Summary (Last 24 hours) at 10/1/2024 1342  Last data filed at 10/1/2024 0504  Gross per 24 hour   Intake 220 ml   Output 900 ml   Net -680 ml         Physical Exam  Constitutional:       General: He is not in acute distress.     Appearance: He is ill-appearing. He is not diaphoretic.   HENT:      Head: Normocephalic and atraumatic.      Right Ear: External ear normal.      Left Ear: External ear normal.      Mouth/Throat:      Mouth: Mucous membranes are dry.   Eyes:      General:         Right eye: No discharge.         Left eye: No discharge.      Extraocular Movements: Extraocular movements intact.   Cardiovascular:      Rate and Rhythm: Normal rate. Rhythm irregular.      Heart sounds: No murmur heard.  Pulmonary:      Effort: Pulmonary effort is normal. No  respiratory distress.      Breath sounds: No wheezing.   Abdominal:      General: Abdomen is flat. There is no distension.      Tenderness: There is abdominal tenderness (RLQ tenderness).   Musculoskeletal:      Cervical back: Normal range of motion.      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 Surgical scar well-healed  Right lower extremity with cracked, dry skin as well as right heel wound  Multiple areas of stage 3-4 pressure ulcers noted to sacrum   Right upper erosion measuring 4x3x2 is noted to significant depth without purulent drainage  Left upper sacrum reveals 2x3x2 irregular-edged wound with brown, foul-smelling purulent drainage       Skin:     General: Skin is dry.   Neurological:      General: No focal deficit present.      Mental Status: He is alert.             Significant Labs: All pertinent labs within the past 24 hours have been reviewed.    Significant Imaging: I have reviewed all pertinent imaging results/findings within the past 24 hours.

## 2024-10-01 NOTE — NURSING
MRI complete at this time, patient tolerated MRI, heart rate 81, O2 sat. 98% on RA, patient transferred back to [facility] without difficulty, cardiac pacemaker placed in normal operating mode per [employer]tr[employer] Rep., tele box re-applied, patient awaiting transport back to his room upstairs.

## 2024-10-01 NOTE — PROGRESS NOTES
Received a call/message from Prasad in the MRI Department in relation to this patient needing to be scheduled for a MRI and has a Medtronic ICD/Pacemaker.  Patient's Device is MRI compatible/conditional.  To meet protocol the Pacemaker/ICD must have been implanted no less than 6 weeks prior to scheduled date of Scan.  ICD/PPM and leads must be from same .  MRI informed ordering MD must input a Cardiac Device Check in clinic and hospital order, patient must have an xray within 6 months or less prior to MRI reprogramming.  Chest xray to be reviewed by Radiologist.       Patient has been added on for an MRI today for 11 AM  Device rep has agreed to be available for the MRI.      Patient has a Medtronic Micra AV

## 2024-10-01 NOTE — PROCEDURES
"Saji Castañeda is a 74 y.o. male patient.    Temp: 99.5 °F (37.5 °C) (10/01/24 1529)  Pulse: 68 (10/01/24 1529)  Resp: 16 (10/01/24 1529)  BP: (!) 118/58 (10/01/24 1529)  SpO2: 99 % (10/01/24 1529)  Weight: 83.4 kg (183 lb 13.8 oz) (09/29/24 2000)  Height: 5' 9" (175.3 cm) (09/29/24 2000)       Debridement    Date/Time: 10/1/2024 5:35 PM    Performed by: Lissett Morales MD  Authorized by: Lissett Morales MD    Consent Done?:  Yes (Verbal)  Anesthesia:  Local infiltration  Local anesthetic:  Lidocaine 1% without epinephrine  Anesthetic total (ml):  20    Wound Details:    Location:  Left hip    Type of Debridement:  Non-excisional       Length (cm):  5       Area (sq cm):  20       Width (cm):  4       Percent Debrided (%):  8       Depth (cm):  6       Total Area Debrided (sq cm):  1.6    Depth of debridement:  Subcutaneous tissue    Tissue debrided:  Adipose, Dermis and Subcutaneous    Devitalized tissue debrided:  Exudate    Instruments:  Blade, Forceps and Scissors  Bleeding:  Minimal  Hemostasis Achieved: Yes  Method Used:  Pressure    Additional wounds:  1    2nd Wound Details:     Debridement - 2nd Wound - General Location: sacrum.    Type of Debridement:  Non-excisional       Length (cm):  10       Area (sq cm):  80       Width (cm):  8       Percent Debrided (%):  5       Depth (cm):  5       Total Area Debrided (sq cm):  4    Depth of debridement:  Subcutaneous tissue    Tissue debrided:  Adipose    Devitalized tissue debrided:  Exudate    Instruments:  Forceps, Blade and Scissors  Bleeding:  None  Patient tolerance:  Patient tolerated the procedure well with no immediate complications      10/1/2024        Lissett Morales MD  General Surgery, PGY-3  Pager# [phone]    "

## 2024-10-01 NOTE — ASSESSMENT & PLAN NOTE
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 General surgery consulted for evaluations of multiple wounds.    - sacral wound and left hip wounds debrided at beside (see procedure note)  - abscess drained from left hip wound  - wounds packed with wet-to-dry kirlex and covered with abd pads   - will re-evaluate wounds tomorrow  - continue local wound care with wound care nurses   - eliquis currently being held by primary team. ok for DVT ppx  - rest of care per primary

## 2024-10-01 NOTE — MEDICAL/APP STUDENT
"Saji Castañeda is a 74 y.o. 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 Arellano, Chronic Multifactorial Anemia, BPH s/p Chronic Sams, paroxysmal A-fib on eliquis, CHB s/p PPM medtronic (10/12/2023), CKD3, Multifactorial HTN, HLD and Osteomyelitis of R foot presenting from [facility] with suspected infection of L hip wound. He had a recent admission with micro notable for  pseudomonas (zosyn sensitive) and MRSA. Started on vanc/zosyn, wound care and gen surg consulted. Holding apixaban. NPO for now.     Patient has no new complaints today. Pain is well controlled on pain regimen. Wound care following. Gen Surg consulted, planning for debridement of L hip wound today. Podiatry consulted recommended MRI of R foot, which revealed acute osteomyelitis of the posterior 2/3 of the calcaneous with a large overlying soft tissue ulcer that involves the distal insertional fibers of the Achilles tendon, early osteo of the lateral malleolus and 1st distal phalanx with overlying soft tissue ulcers. Podiatry agreed with wound care recommendations.    Active Hospital Problems    Diagnosis  POA    *Osteomyelitis [M86.9]  Yes    Thrombocytosis [D75.839]  Yes    Anemia [D64.9]  Unknown    Chronic indwelling Sams catheter [Z97.8]  Not Applicable    Stage 3b chronic kidney disease [N18.32]  Yes    Wound of left leg [S81.802A]  Yes    Type 2 diabetes mellitus, with long-term current use of insulin [E11.9, Z79.4]  Not Applicable    Essential hypertension [I10]  Yes     Chronic      Resolved Hospital Problems   No resolved problems to display.     Temp: 98.4 °F (36.9 °C) (10/01/24 1242)  Pulse: 70 (10/01/24 1242)  Resp: 16 (10/01/24 1242)  BP: (!) 119/55 (10/01/24 1242)  SpO2: 99 % (10/01/24 1242)  Weight: 83.4 kg (183 lb 13.8 oz) (09/29/24 2000)  Height: 5' 9" (175.3 cm) (09/29/24 2000)    Review of Systems   Constitutional:  " Negative for chills, diaphoresis and fever.   HENT:  Negative for nosebleeds, sinus pain and tinnitus.    Eyes:  Negative for double vision, photophobia and redness.   Respiratory:  Negative for cough, sputum production, shortness of breath and stridor.    Cardiovascular:  Negative for chest pain, palpitations, orthopnea and leg swelling.   Gastrointestinal:  Negative for abdominal pain, heartburn, nausea and vomiting.   Genitourinary:  Negative for dysuria, frequency and urgency.   Musculoskeletal:  Negative for back pain, myalgias and neck pain.   Skin:  Negative for itching and rash.   Neurological:  Negative for dizziness, tingling and tremors.   Endo/Heme/Allergies:  Negative for environmental allergies and polydipsia.   Psychiatric/Behavioral:  Negative for depression and hallucinations.        Physical Exam  Constitutional:       General: He is not in acute distress.     Appearance: Normal appearance. He is normal weight. He is not ill-appearing or toxic-appearing.   HENT:      Head: Normocephalic and atraumatic.      Nose: No congestion or rhinorrhea.   Eyes:      General: No scleral icterus.     Pupils: Pupils are equal, round, and reactive to light.   Cardiovascular:      Rate and Rhythm: Normal rate.      Pulses: Normal pulses.      Heart sounds: Normal heart sounds. No murmur heard.     No friction rub. No gallop.   Pulmonary:      Effort: Pulmonary effort is normal. No respiratory distress.      Breath sounds: Normal breath sounds. No stridor. No wheezing, rhonchi or rales.   Abdominal:      General: Abdomen is flat. There is no distension.      Palpations: Abdomen is soft.      Tenderness: There is no abdominal tenderness. There is no guarding.   Musculoskeletal:         General: Normal range of motion.      Cervical back: Normal range of motion. No rigidity.      Right lower leg: No edema.      Left lower leg: No edema.   Lymphadenopathy:      Cervical: No cervical adenopathy.   Skin:     General:  Skin is warm and dry.      Coloration: Skin is not pale.      Findings: No rash.      Comments: Extensive grade 3-4 sacral wounds, L hip wound w/ purulence  Grade 4 R heel wound extending to bone   Neurological:      General: No focal deficit present.      Mental Status: He is alert and oriented to person, place, and time. Mental status is at baseline.      Cranial Nerves: No cranial nerve deficit.      Sensory: Sensory deficit present.   Psychiatric:         Mood and Affect: Mood normal.         Behavior: Behavior normal.         Thought Content: Thought content normal.         Judgment: Judgment normal.           Assessment and Plan  Chronic Osteomyelitis of R heel  MRSA history  History of Carbapenem resistant Pseudomonas   - Recent history of IV Vanc and Zoysn 7/25  - extensive grade 3-4 sacral wounds, L hip wound purulence  - chronic osteomyelitis of R heel with nonhealing ulcer    - blood cultures no growth x2 days  - wound cultures reveal presumptive proteus species, sensitivities pending  - started on IV Vanc in the ED, recommend renally dosing  - continue Vanc 1750 mg q12h and Zosyn therapy, do not recommend Cefepime due to hx of neurotoxicity    - wound care following  - WBC elevated 23.2 this morning, trending down, 13.4 likely due to wound infection  - CXR shows coarse interstitial attenuation, similar to prior  - trend CBC and CMP  - procalcitonin 0.12  - continue oxycodone and tylenol PRN for pain  - continue IV fluids   - per pharmacy, obtain vanc trough on 10/01 at 6:00 before giving 4th dose, range 10-20 mcg/mL  - consulted gen surgery, patient NPO, planning on wound debridement today (10/1)     Paroxysmal Atrial Fibrillation on Eliquis   Pacemaker   PVD  CHF  - echo on 7/2024 showed EF of 55-60% with no significant valvular abnormalities   - follows with Vascular Surgeon Dr. Banda  - hold home Lasix and HCTZ, not volume overloaded   - electrolytes stable, trend CMP  - hold Eliquis due to low H&H  and awaiting gen surgery recs  - monitor Afib, continuous telemetry      Anemia of Chronic Disease   Acute Anemia   - H&H 7.1 and 23.7  - transfused 1 unit of PRBCs in ED  - hold iron supplement   - guaic + stool test     Below Knee Amputation 3/24  - due to nonhealing L foot wound   - well-healed scar   - patient is non-ambulatory      Chronic Sams  Suspected Urinary Tract Infection  - UA shows +1 blood, 3+ leuk esterase, >100 WBCs and few bacteria, patient reports episode of dysuria yesterday and suprapubic pain on palpation   - culture shows no growth x1 day  - last Sams change 10/29        Type 2 Diabetes Mellitus on Insulin   - Hb A1c on 7/25 was 6  - continue insulin sliding scale      CKD3  - hold nephrotoxic medications  - hold Lasix at this time   - renally dose Vanc     HTN  - normotensive at this time  - hold diuretics, no signs of volume overload      Hypoalbuminemia   - albumin 1.2         Raegan Melvin  10/1/2024

## 2024-10-01 NOTE — PROGRESS NOTES
An MRI has been ordered on this patient, an IMPLANTED CARDIAC DEVICE is present.    Micra leadless PPM in place    The patient's medical record has been reviewed for the following:    -the MRI conditional system has been implanted for a minimum of 6 weeks and is considered post the lead maturation period    -there are no known lead extenders, lead adaptors, or abandoned leads in place    -there are no known broken leads or leads with intermittent electrical contact as confirmed by the lead impedance history    -pacing capture thresholds are < 2.0 volts (V) at a pulse width of 0.40 milliseconds (ms)    -Measured pacing lead impedances are >/= 200 Ohms and </= 1500 Ohms    -there is no noted diaphragmatic stimulation at a pacing output of 5.0V and at a pulse width of 1.0ms in patients whose device will be programmed to an asynchronous pacing mode    Parameters were reprogrammed to MRI safe mode as per the manufacturers scanning guidelines; post scan, parameters were returned to initial settings.    If pacing is indicated, please program device to VOO + 10 bpm above base rate      Lead testing was repeated post scan to ensure proper function and safety margins.

## 2024-10-01 NOTE — ASSESSMENT & PLAN NOTE
Chronic, uncontrolled. Latest blood pressure and vitals reviewed-     Temp:  [98.4 °F (36.9 °C)-98.9 °F (37.2 °C)]   Pulse:  [61-85]   Resp:  [16-18]   BP: (104-122)/(53-68)   SpO2:  [98 %-100 %] .   Home meds for hypertension were reviewed and noted below.   Hypertension Medications               furosemide (LASIX) 20 MG tablet Take 1 tablet (20 mg total) by mouth once daily.    NIFEdipine (PROCARDIA-XL) 90 MG (OSM) 24 hr tablet Take 1 tablet (90 mg total) by mouth once daily.            While in the hospital, will manage blood pressure as follows; Adjust home antihypertensive regimen as follows- home home Nifedipine    Will utilize p.r.n. blood pressure medication only if patient's blood pressure greater than 180/110 and he develops symptoms such as worsening chest pain or shortness of breath.     - hold diuretics, no signs of volume overload

## 2024-10-01 NOTE — PROGRESS NOTES
[facility] ([facility]  General Surgery  Progress Note    Subjective:     History of Present Illness:  Mr. 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  General surgery consulted for evaluation of wounds for possible debridement.       Post-Op Info:  * No surgery found *         Interval History: NAEO. Beside debridement of L hip and sacral wound. Expression of purulence from L hip wound. Packed with kirlex     Medications:  Continuous Infusions:  Scheduled Meds:   ascorbic acid (vitamin C)  500 mg Oral BID    enoxparin  40 mg Subcutaneous Q24H (prophylaxis, 1700)    [START ON 10/4/2024] ergocalciferol  50,000 Units Oral Every Fri    gabapentin  300 mg Oral BID    insulin glargine U-100  6 Units Subcutaneous BID    LIDOcaine HCL 10 mg/ml (1%)  20 mL Intradermal Once    multivitamin  1 tablet Oral Daily    piperacillin-tazobactam (Zosyn) IV (PEDS and ADULTS) (extended infusion is not appropriate)  4.5 g Intravenous Q8H    tamsulosin  0.4 mg Oral Daily     PRN Meds:  Current Facility-Administered Medications:     acetaminophen, 650 mg, Oral, Q6H PRN    dextrose 10%, 12.5 g, Intravenous, PRN    dextrose 10%, 25 g, Intravenous, PRN    diphenhydrAMINE, 25 mg, Oral, Q6H PRN    glucagon (human recombinant), 1 mg, Intramuscular, PRN    glucose, 16 g, Oral, PRN    glucose, 24 g, Oral, PRN    HYDROmorphone, 0.2 mg, Intravenous, Q6H PRN    insulin aspart U-100, 0-5 Units, Subcutaneous, QID (AC + HS) PRN    naloxone, 0.02 mg, Intravenous, PRN    oxyCODONE, 10 mg, Oral, Q6H PRN    oxyCODONE, 5 mg, Oral, Q6H PRN    sodium chloride 0.9%, 10 mL, Intravenous, Q12H PRN    vancomycin - pharmacy to dose, , Intravenous, pharmacy to  manage frequency     Review of patient's allergies indicates:  No Known Allergies  Objective:     Vital Signs (Most Recent):  Temp: 99.5 °F (37.5 °C) (10/01/24 1529)  Pulse: 68 (10/01/24 1529)  Resp: 16 (10/01/24 1529)  BP: (!) 118/58 (10/01/24 1529)  SpO2: 99 % (10/01/24 1529) Vital Signs (24h Range):  Temp:  [98.4 °F (36.9 °C)-99.5 °F (37.5 °C)] 99.5 °F (37.5 °C)  Pulse:  [61-85] 68  Resp:  [16-18] 16  SpO2:  [98 %-100 %] 99 %  BP: (104-122)/(55-68) 118/58     Weight: 83.4 kg (183 lb 13.8 oz)  Body mass index is 27.15 kg/m².    Intake/Output - Last 3 Shifts         09/29 0700  09/30 0659 09/30 0700  10/01 0659 10/01 0700  10/02 0659    P.O. 120 120     I.V. (mL/kg) 544.4 (6.5)      Blood 344.6      IV Piggyback 2821 100     Total Intake(mL/kg) 3830 (45.9) 220 (2.6)     Urine (mL/kg/hr) 1650 900 (0.4)     Total Output 1650 900     Net +2180 -680                     Physical Exam  Vitals and nursing note reviewed.   Constitutional:       General: He is not in acute distress.     Appearance: He is not ill-appearing.   Cardiovascular:      Rate and Rhythm: Normal rate.   Pulmonary:      Effort: Pulmonary effort is normal. No respiratory distress.   Musculoskeletal:      Comments: L AKA stump   Skin:     General: Skin is warm.      Comments: Left hip wound with pocket of pus expressed, tracks superiorly and inferiorly. Necrotic and fibrinous material sharply debrided  Sacral wound with clean wound base, minimal fibrinous exudate sharply debrided.      Neurological:      Mental Status: He is alert.          Significant Labs:  I have reviewed all pertinent lab results within the past 24 hours.    Significant Diagnostics:  I have reviewed all pertinent imaging results/findings within the past 24 hours.  Assessment/Plan:     Wounds, multiple  Saji Garry 75 yo male     Wound of left leg  Saji Liberal 75 yo male PMHx HTN, HLD, T2DM on long-term insulin, BPH with chronic Sams in place, paroxysmal A-fib (on eliquis),  CHB s/p PPM medtronic (10/12/2023), CKD3, R foot osteomyelitis and PVD with diabetic ulcer s/p left AKA (3/27/2024). General surgery consulted for evaluations of multiple wounds.    - sacral wound and left hip wounds debrided at beside (see procedure note)  - abscess drained from left hip wound  - wounds packed with wet-to-dry kirlex and covered with abd pads   - will re-evaluate wounds tomorrow  - continue local wound care with wound care nurses   - eliquis currently being held by primary team. ok for DVT ppx  - rest of care per primary        Lissett Morales MD  General Surgery  [facility] ([place])

## 2024-10-01 NOTE — ASSESSMENT & PLAN NOTE
Left hip wound with purulent foul smelling drainage    - Wound cx ordered, f/u results  - On Vanc/Zosy  - Gen surg consulted for debridement , plans for 10/1

## 2024-10-01 NOTE — ASSESSMENT & PLAN NOTE
Pt has failed multiple void trials in the past, now with chronic fung. Most recently replaced 9/29. C/f UTI  UA shows +1 blood, 3+ leuk esterase, >100 WBCs and few bacteria, patient reports episode of dysuria yesterday and suprapubic pain on palpation   - Ucx, NGTD  - last Fung change today

## 2024-10-01 NOTE — NURSING
patient arrived for scheduled MRI, patient identification verified X 2, allergies reviewed, patient transferred to MRI table without difficulty, cardiac pacemaker placed in MRI safe mode per [employer]tr[employer] Rep., MRI safe cardiac monitor and pulse ox. applied, heart rate 80, O2 sat. 97% on RA, NAD noted at this time, will continue to monitor patient throughout MRI.

## 2024-10-01 NOTE — ASSESSMENT & PLAN NOTE
Anemia is likely due to chronic disease due to Chronic Kidney Disease. Most recent hemoglobin and hematocrit are listed below.  Recent Labs     09/30/24  0042 09/30/24  1200 10/01/24  0249   HGB 7.2* 7.3* 7.1*   HCT 24.1* 23.8* 23.7*     Plan  - Monitor serial CBC: daily  - Transfuse PRBC if patient becomes hemodynamically unstable, symptomatic or H/H drops below 7/21.  - Patient has received 1 units of PRBCs on 9/29  - Patient's anemia is currently   7.1, below baseline 8-9  - Likely multifactorial with AOCD, as well as potentially GI bleed vs  AVM. Continue to monitor  - H&H 6.3 and 21.6   - hold iron supplement   - guaic + stool test  - Elevated ferritin, anemia 2/2 chronic disease

## 2024-10-01 NOTE — SUBJECTIVE & OBJECTIVE
Interval History: NAEO. Beside debridement of L hip and sacral wound. Expression of purulence from L hip wound. Packed with kirlex     Medications:  Continuous Infusions:  Scheduled Meds:   ascorbic acid (vitamin C)  500 mg Oral BID    enoxparin  40 mg Subcutaneous Q24H (prophylaxis, 1700)    [START ON 10/4/2024] ergocalciferol  50,000 Units Oral Every Fri    gabapentin  300 mg Oral BID    insulin glargine U-100  6 Units Subcutaneous BID    LIDOcaine HCL 10 mg/ml (1%)  20 mL Intradermal Once    multivitamin  1 tablet Oral Daily    piperacillin-tazobactam (Zosyn) IV (PEDS and ADULTS) (extended infusion is not appropriate)  4.5 g Intravenous Q8H    tamsulosin  0.4 mg Oral Daily     PRN Meds:  Current Facility-Administered Medications:     acetaminophen, 650 mg, Oral, Q6H PRN    dextrose 10%, 12.5 g, Intravenous, PRN    dextrose 10%, 25 g, Intravenous, PRN    diphenhydrAMINE, 25 mg, Oral, Q6H PRN    glucagon (human recombinant), 1 mg, Intramuscular, PRN    glucose, 16 g, Oral, PRN    glucose, 24 g, Oral, PRN    HYDROmorphone, 0.2 mg, Intravenous, Q6H PRN    insulin aspart U-100, 0-5 Units, Subcutaneous, QID (AC + HS) PRN    naloxone, 0.02 mg, Intravenous, PRN    oxyCODONE, 10 mg, Oral, Q6H PRN    oxyCODONE, 5 mg, Oral, Q6H PRN    sodium chloride 0.9%, 10 mL, Intravenous, Q12H PRN    vancomycin - pharmacy to dose, , Intravenous, pharmacy to manage frequency     Review of patient's allergies indicates:  No Known Allergies  Objective:     Vital Signs (Most Recent):  Temp: 99.5 °F (37.5 °C) (10/01/24 1529)  Pulse: 68 (10/01/24 1529)  Resp: 16 (10/01/24 1529)  BP: (!) 118/58 (10/01/24 1529)  SpO2: 99 % (10/01/24 1529) Vital Signs (24h Range):  Temp:  [98.4 °F (36.9 °C)-99.5 °F (37.5 °C)] 99.5 °F (37.5 °C)  Pulse:  [61-85] 68  Resp:  [16-18] 16  SpO2:  [98 %-100 %] 99 %  BP: (104-122)/(55-68) 118/58     Weight: 83.4 kg (183 lb 13.8 oz)  Body mass index is 27.15 kg/m².    Intake/Output - Last 3 Shifts         09/29  0700  09/30 0659 09/30 0700  10/01 0659 10/01 0700  10/02 0659    P.O. 120 120     I.V. (mL/kg) 544.4 (6.5)      Blood 344.6      IV Piggyback 2821 100     Total Intake(mL/kg) 3830 (45.9) 220 (2.6)     Urine (mL/kg/hr) 1650 900 (0.4)     Total Output 1650 900     Net +2180 -680                     Physical Exam  Vitals and nursing note reviewed.   Constitutional:       General: He is not in acute distress.     Appearance: He is not ill-appearing.   Cardiovascular:      Rate and Rhythm: Normal rate.   Pulmonary:      Effort: Pulmonary effort is normal. No respiratory distress.   Musculoskeletal:      Comments: L AKA stump   Skin:     General: Skin is warm.      Comments: Left hip wound with pocket of pus expressed, tracks superiorly and inferiorly. Necrotic and fibrinous material sharply debrided  Sacral wound with clean wound base, minimal fibrinous exudate sharply debrided.      Neurological:      Mental Status: He is alert.          Significant Labs:  I have reviewed all pertinent lab results within the past 24 hours.    Significant Diagnostics:  I have reviewed all pertinent imaging results/findings within the past 24 hours.

## 2024-10-01 NOTE — PROGRESS NOTES
Pharmacokinetic Assessment Follow Up: IV Vancomycin    Vancomycin serum concentration assessment(s):  The vancomycin trough level, drawn at ~ 20.75 hours from the last dose, was 27.8 mcg/mL which is above the goal range of 15-20 mcg/mL.   As the trough was drawn early, using population kinetics a true 24-hour level was likely to be closer to ~23 mcg/mL which is still above the goal range.   The level is expected to be within goal range around 1000 on 10/1.  Renal function appears to be stable near baseline at this time.       Vancomycin Regimen Plan:  Discontinue scheduled dosing at this time.   Obtain a vancomycin random level on 10/1/24 around 1000 before re-scheduling vancomycin 1250 mg (15 mg/kg) IV every 24 hours.   Monitor for signs and symptoms of nephrotoxicity and infusion reactions.       Drug levels (last 3 results):  Recent Labs   Lab Result Units 10/01/24  0249   Vancomycin-Trough ug/mL 27.8*       Pharmacy will continue to follow and monitor vancomycin.    Please contact pharmacy for questions regarding this assessment.    Thank you for the consult,   Camille Patterson       Patient brief summary:  Saji Castañeda is a 74 y.o. male initiated on antimicrobial therapy with IV Vancomycin for treatment of bone/joint infection    The patient's current regimen is dose by levels.     Drug Allergies:   Review of patient's allergies indicates:  No Known Allergies    Actual Body Weight:   83.4 kg    Renal Function:   Estimated Creatinine Clearance: 63.8 mL/min (based on SCr of 1 mg/dL).,       CBC (last 72 hours):  Recent Labs   Lab Result Units 09/29/24  1446 09/30/24  0042 09/30/24  1200 10/01/24  0249   WBC K/uL 13.66* 23.22* 14.14* 13.44*   Hemoglobin g/dL 6.3* 7.2* 7.3* 7.1*   Hematocrit % 21.3* 24.1* 23.8* 23.7*   Platelets K/uL 690* 547* 557* 569*   Gran % % 82.7* 88.2* 83.8* 83.6*   Lymph % % 8.1* 4.3* 6.6* 6.4*   Mono % % 3.8* 2.8* 4.5 3.8*   Eosinophil % % 4.7 3.7 4.2 5.4   Basophil % % 0.1 0.2 0.3 0.4    Differential Method  Automated Automated Automated Automated       Metabolic Panel (last 72 hours):  Recent Labs   Lab Result Units 09/29/24  1446 09/29/24  1722 09/30/24  0615 10/01/24  0249   Sodium mmol/L 138  --  137 137   Potassium mmol/L 5.0  --  4.5 4.2   Chloride mmol/L 109  --  106 105   CO2 mmol/L 21*  --  24 25   Glucose mg/dL 156*  --  140* 118*   Glucose, UA   --  Negative  --   --    BUN mg/dL 31*  --  29* 24*   Creatinine mg/dL 1.1  --  0.9 1.0   Albumin g/dL 1.3*  --  1.2* 1.2*   Total Bilirubin mg/dL 0.1  --  1.3* 0.2   Alkaline Phosphatase U/L 86  --  87 234*   AST U/L 35  --  19 25   ALT U/L 33  --  27 28   Magnesium mg/dL 1.7  --  1.5* 1.9   Phosphorus mg/dL 3.3  --  3.3 3.7       Vancomycin Administrations:  vancomycin given in the last 96 hours                     vancomycin 1.75 g in 5 % dextrose 500 mL IVPB (mg) 1,750 mg New Bag 09/30/24 0604    vancomycin (VANCOCIN) 2,500 mg in D5W 500 mL IVPB (mg) 2,500 mg New Bag 09/29/24 1639                    Microbiologic Results:  Microbiology Results (last 7 days)       Procedure Component Value Units Date/Time    Aerobic culture (Specify Source) **CANNOT BE ORDERED AS STAT* [[id number]]  (Abnormal) Collected: 09/29/24 1628    Order Status: Completed Specimen: Decubitus from Buttocks, Left Updated: 10/01/24 0846     Aerobic Bacterial Culture PRESUMPTIVE PROTEUS SPECIES  Few  Identification and susceptibility pending    Skin bossman also present      Urine culture [[id number]] Collected: 09/29/24 1722    Order Status: Completed Specimen: Urine Updated: 09/30/24 2308     Urine Culture, Routine No growth    Narrative:      Specimen Source->Urine    Blood culture x two cultures. Draw prior to antibiotics. [[id number]] Collected: 09/29/24 1446    Order Status: Completed Specimen: Blood from Peripheral, Hand, Left Updated: 09/30/24 1812     Blood Culture, Routine No Growth to date      No Growth to date    Narrative:      Aerobic and anaerobic    Blood  culture x two cultures. Draw prior to antibiotics. [[id number]] Collected: 09/29/24 1446    Order Status: Completed Specimen: Blood from Peripheral, Antecubital, Right Updated: 09/30/24 1812     Blood Culture, Routine No Growth to date      No Growth to date    Narrative:      Aerobic and anaerobic

## 2024-10-02 LAB
ALBUMIN SERPL BCP-MCNC: 1.4 G/DL (ref 3.5–5.2)
ALP SERPL-CCNC: 179 U/L (ref 55–135)
ALT SERPL W/O P-5'-P-CCNC: 25 U/L (ref 10–44)
ANION GAP SERPL CALC-SCNC: 10 MMOL/L (ref 8–16)
AST SERPL-CCNC: 18 U/L (ref 10–40)
BACTERIA SPEC AEROBE CULT: ABNORMAL
BASOPHILS # BLD AUTO: 0.04 K/UL (ref 0–0.2)
BASOPHILS NFR BLD: 0.4 % (ref 0–1.9)
BILIRUB SERPL-MCNC: 0.3 MG/DL (ref 0.1–1)
BUN SERPL-MCNC: 23 MG/DL (ref 8–23)
CALCIUM SERPL-MCNC: 8.2 MG/DL (ref 8.7–10.5)
CHLORIDE SERPL-SCNC: 103 MMOL/L (ref 95–110)
CK SERPL-CCNC: 56 U/L (ref 20–200)
CO2 SERPL-SCNC: 26 MMOL/L (ref 23–29)
CREAT SERPL-MCNC: 1.1 MG/DL (ref 0.5–1.4)
DIFFERENTIAL METHOD BLD: ABNORMAL
EOSINOPHIL # BLD AUTO: 0.6 K/UL (ref 0–0.5)
EOSINOPHIL NFR BLD: 6.3 % (ref 0–8)
ERYTHROCYTE [DISTWIDTH] IN BLOOD BY AUTOMATED COUNT: 16.5 % (ref 11.5–14.5)
EST. GFR  (NO RACE VARIABLE): >60 ML/MIN/1.73 M^2
GLUCOSE SERPL-MCNC: 115 MG/DL (ref 70–110)
HCT VFR BLD AUTO: 22.1 % (ref 40–54)
HGB BLD-MCNC: 6.8 G/DL (ref 14–18)
IMM GRANULOCYTES # BLD AUTO: 0.05 K/UL (ref 0–0.04)
IMM GRANULOCYTES NFR BLD AUTO: 0.5 % (ref 0–0.5)
LYMPHOCYTES # BLD AUTO: 1.2 K/UL (ref 1–4.8)
LYMPHOCYTES NFR BLD: 11.6 % (ref 18–48)
MAGNESIUM SERPL-MCNC: 1.9 MG/DL (ref 1.6–2.6)
MCH RBC QN AUTO: 25.6 PG (ref 27–31)
MCHC RBC AUTO-ENTMCNC: 30.8 G/DL (ref 32–36)
MCV RBC AUTO: 83 FL (ref 82–98)
MONOCYTES # BLD AUTO: 0.6 K/UL (ref 0.3–1)
MONOCYTES NFR BLD: 5.5 % (ref 4–15)
NEUTROPHILS # BLD AUTO: 7.6 K/UL (ref 1.8–7.7)
NEUTROPHILS NFR BLD: 75.7 % (ref 38–73)
NRBC BLD-RTO: 0 /100 WBC
PHOSPHATE SERPL-MCNC: 4 MG/DL (ref 2.7–4.5)
PLATELET # BLD AUTO: 527 K/UL (ref 150–450)
PMV BLD AUTO: 8.6 FL (ref 9.2–12.9)
POCT GLUCOSE: 137 MG/DL (ref 70–110)
POCT GLUCOSE: 159 MG/DL (ref 70–110)
POCT GLUCOSE: 190 MG/DL (ref 70–110)
POCT GLUCOSE: 216 MG/DL (ref 70–110)
POTASSIUM SERPL-SCNC: 3.7 MMOL/L (ref 3.5–5.1)
PROT SERPL-MCNC: 6.3 G/DL (ref 6–8.4)
RBC # BLD AUTO: 2.66 M/UL (ref 4.6–6.2)
SODIUM SERPL-SCNC: 139 MMOL/L (ref 136–145)
VANCOMYCIN SERPL-MCNC: 17 UG/ML
WBC # BLD AUTO: 10.08 K/UL (ref 3.9–12.7)

## 2024-10-02 PROCEDURE — 82550 ASSAY OF CK (CPK): CPT | Mod: HCNC

## 2024-10-02 PROCEDURE — 20600001 HC STEP DOWN PRIVATE ROOM: Mod: HCNC

## 2024-10-02 PROCEDURE — 25000003 PHARM REV CODE 250: Mod: HCNC

## 2024-10-02 PROCEDURE — 83735 ASSAY OF MAGNESIUM: CPT | Mod: HCNC

## 2024-10-02 PROCEDURE — 80202 ASSAY OF VANCOMYCIN: CPT | Mod: HCNC | Performed by: STUDENT IN AN ORGANIZED HEALTH CARE EDUCATION/TRAINING PROGRAM

## 2024-10-02 PROCEDURE — 63600175 PHARM REV CODE 636 W HCPCS: Mod: HCNC

## 2024-10-02 PROCEDURE — 27000207 HC ISOLATION: Mod: HCNC

## 2024-10-02 PROCEDURE — 85025 COMPLETE CBC W/AUTO DIFF WBC: CPT | Mod: HCNC

## 2024-10-02 PROCEDURE — 36415 COLL VENOUS BLD VENIPUNCTURE: CPT | Mod: HCNC

## 2024-10-02 PROCEDURE — 63600175 PHARM REV CODE 636 W HCPCS: Mod: HCNC | Performed by: PHYSICIAN ASSISTANT

## 2024-10-02 PROCEDURE — 80053 COMPREHEN METABOLIC PANEL: CPT | Mod: HCNC

## 2024-10-02 PROCEDURE — 99223 1ST HOSP IP/OBS HIGH 75: CPT | Mod: HCNC,,, | Performed by: PHYSICIAN ASSISTANT

## 2024-10-02 PROCEDURE — 84100 ASSAY OF PHOSPHORUS: CPT | Mod: HCNC

## 2024-10-02 PROCEDURE — 25000003 PHARM REV CODE 250: Mod: HCNC | Performed by: PHYSICIAN ASSISTANT

## 2024-10-02 RX ORDER — FUROSEMIDE 20 MG/1
20 TABLET ORAL DAILY
Status: DISCONTINUED | OUTPATIENT
Start: 2024-10-03 | End: 2024-10-02

## 2024-10-02 RX ORDER — FUROSEMIDE 20 MG/1
20 TABLET ORAL DAILY
Status: DISCONTINUED | OUTPATIENT
Start: 2024-10-02 | End: 2024-10-09 | Stop reason: HOSPADM

## 2024-10-02 RX ADMIN — PIPERACILLIN SODIUM AND TAZOBACTAM SODIUM 4.5 G: 4; .5 INJECTION, POWDER, FOR SOLUTION INTRAVENOUS at 09:10

## 2024-10-02 RX ADMIN — OXYCODONE HYDROCHLORIDE 10 MG: 5 TABLET ORAL at 08:10

## 2024-10-02 RX ADMIN — INSULIN GLARGINE 6 UNITS: 100 INJECTION, SOLUTION SUBCUTANEOUS at 08:10

## 2024-10-02 RX ADMIN — OXYCODONE HYDROCHLORIDE 10 MG: 5 TABLET ORAL at 02:10

## 2024-10-02 RX ADMIN — OXYCODONE HYDROCHLORIDE AND ACETAMINOPHEN 500 MG: 500 TABLET ORAL at 09:10

## 2024-10-02 RX ADMIN — PIPERACILLIN SODIUM AND TAZOBACTAM SODIUM 4.5 G: 4; .5 INJECTION, POWDER, FOR SOLUTION INTRAVENOUS at 06:10

## 2024-10-02 RX ADMIN — DAPTOMYCIN 665 MG: 350 INJECTION, POWDER, LYOPHILIZED, FOR SOLUTION INTRAVENOUS at 02:10

## 2024-10-02 RX ADMIN — TAMSULOSIN HYDROCHLORIDE 0.4 MG: 0.4 CAPSULE ORAL at 08:10

## 2024-10-02 RX ADMIN — PIPERACILLIN SODIUM AND TAZOBACTAM SODIUM 4.5 G: 4; .5 INJECTION, POWDER, FOR SOLUTION INTRAVENOUS at 04:10

## 2024-10-02 RX ADMIN — INSULIN GLARGINE 6 UNITS: 100 INJECTION, SOLUTION SUBCUTANEOUS at 09:10

## 2024-10-02 RX ADMIN — FUROSEMIDE 20 MG: 20 TABLET ORAL at 06:10

## 2024-10-02 RX ADMIN — OXYCODONE HYDROCHLORIDE AND ACETAMINOPHEN 500 MG: 500 TABLET ORAL at 08:10

## 2024-10-02 RX ADMIN — HYDROMORPHONE HYDROCHLORIDE 0.2 MG: 1 INJECTION, SOLUTION INTRAMUSCULAR; INTRAVENOUS; SUBCUTANEOUS at 12:10

## 2024-10-02 RX ADMIN — GABAPENTIN 300 MG: 300 CAPSULE ORAL at 09:10

## 2024-10-02 RX ADMIN — ENOXAPARIN SODIUM 40 MG: 40 INJECTION SUBCUTANEOUS at 06:10

## 2024-10-02 RX ADMIN — GABAPENTIN 300 MG: 300 CAPSULE ORAL at 08:10

## 2024-10-02 RX ADMIN — THERA TABS 1 TABLET: TAB at 08:10

## 2024-10-02 NOTE — NURSING
Patient's right arm is significantly swollen. MD made aware via secure chat. No new orders at this time.

## 2024-10-02 NOTE — SUBJECTIVE & OBJECTIVE
Interval History: NAEO. Dressing and packing removed at beside for  assessment of L hip and sacral wound. No persistent necrotic tissue or eschar noted. Repacked with WTRAY kirlex.     Medications:  Continuous Infusions:  Scheduled Meds:   ascorbic acid (vitamin C)  500 mg Oral BID    enoxparin  40 mg Subcutaneous Q24H (prophylaxis, 1700)    [START ON 10/4/2024] ergocalciferol  50,000 Units Oral Every Fri    gabapentin  300 mg Oral BID    insulin glargine U-100  6 Units Subcutaneous BID    multivitamin  1 tablet Oral Daily    piperacillin-tazobactam (Zosyn) IV (PEDS and ADULTS) (extended infusion is not appropriate)  4.5 g Intravenous Q8H    tamsulosin  0.4 mg Oral Daily     PRN Meds:  Current Facility-Administered Medications:     acetaminophen, 650 mg, Oral, Q6H PRN    dextrose 10%, 12.5 g, Intravenous, PRN    dextrose 10%, 25 g, Intravenous, PRN    diphenhydrAMINE, 25 mg, Oral, Q6H PRN    glucagon (human recombinant), 1 mg, Intramuscular, PRN    glucose, 16 g, Oral, PRN    glucose, 24 g, Oral, PRN    HYDROmorphone, 0.2 mg, Intravenous, Q6H PRN    insulin aspart U-100, 0-5 Units, Subcutaneous, QID (AC + HS) PRN    naloxone, 0.02 mg, Intravenous, PRN    oxyCODONE, 10 mg, Oral, Q6H PRN    oxyCODONE, 5 mg, Oral, Q6H PRN    sodium chloride 0.9%, 10 mL, Intravenous, Q12H PRN    vancomycin - pharmacy to dose, , Intravenous, pharmacy to manage frequency     Review of patient's allergies indicates:  No Known Allergies  Objective:     Vital Signs (Most Recent):  Temp: 98.7 °F (37.1 °C) (10/02/24 1145)  Pulse: 68 (10/02/24 1145)  Resp: 18 (10/02/24 1145)  BP: 130/60 (10/02/24 1145)  SpO2: (!) 92 % (10/02/24 1145) Vital Signs (24h Range):  Temp:  [98.2 °F (36.8 °C)-99.5 °F (37.5 °C)] 98.7 °F (37.1 °C)  Pulse:  [57-81] 68  Resp:  [16-18] 18  SpO2:  [92 %-100 %] 92 %  BP: (118-144)/(55-63) 130/60     Weight: 83.4 kg (183 lb 13.8 oz)  Body mass index is 27.15 kg/m².    Intake/Output - Last 3 Shifts         09/30 0700  10/01 0659  10/01 0700  10/02 0659 10/02 0700  10/03 0659    P.O. 120      I.V. (mL/kg)       Blood       IV Piggyback 100      Total Intake(mL/kg) 220 (2.6)      Urine (mL/kg/hr) 900 (0.4) 850 (0.4)     Total Output 900 850     Net -680 -850                     Physical Exam  Vitals and nursing note reviewed.   Constitutional:       General: He is not in acute distress.     Appearance: He is not ill-appearing.   Cardiovascular:      Rate and Rhythm: Normal rate.   Pulmonary:      Effort: Pulmonary effort is normal. No respiratory distress.   Musculoskeletal:      Comments: L AKA stump   Skin:     General: Skin is warm.      Comments: Left hip wound packing saturated with purulence. Repacked with WTD Kerlix.  Sacral wound with clean wound base and minimal bleeding.       Neurological:      Mental Status: He is alert.                    Significant Labs:  I have reviewed all pertinent lab results within the past 24 hours.    Significant Diagnostics:  I have reviewed all pertinent imaging results/findings within the past 24 hours.

## 2024-10-02 NOTE — ASSESSMENT & PLAN NOTE
R heal OM diagnosed during admission 6/26-7/11 with bone cx positive for pseudomonas and MRSA. Discharged on vanc and cefepime for 6 week course.  Recent admission 8/2-8/6, at that time Cefepime discontinued due to c/f Cefepime neurotoxicity, then developed Vanc YNES, and discharged on Daptomycin and Zosyn  - extensive grade 3-4 sacral wounds, L hip wound purulence  - chronic osteomyelitis of R heel with nonhealing ulcer    - Wound Cx from L buttocks, positive for proteus susceptibilities pending  - Blood Cx NGTD  - started on IV Vanc in the ED, recommend renally dosing  - continue Vanc 1750 mg q12h   - continue Zosyn  - Wound care consulted, recommended podiatry consult, providing   - WBC elevated 13.6, likely due to wound infection  - trend CBC and CMP  - procalcitonin 0.12  - continue oxycodone and tylenol PRN for pain  - per pharmacy, obtain vanc trough on 10/01 at 6:00 before giving 4th dose, range 10-20 mcg/mL  - podiatry consulted, and reported no surgical intervention at this time   - MRI ordered , c/f acute osteomyelitis of 2/3 calcaneus and early osteomyelitis of lateral malleolus & 1st distal phalanx  - ID consulted, IV Dapto started 10/2/24

## 2024-10-02 NOTE — PROGRESS NOTES
[facility] ([facility]-)  General Surgery  Progress Note    Subjective:     History of Present Illness:  Mr. 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  General surgery consulted for evaluation of wounds for possible debridement.       Post-Op Info:  * No surgery found *         Interval History: NAEO. Dressing and packing removed at beside for  assessment of L hip and sacral wound. No persistent necrotic tissue or eschar noted. Repacked with WTD kirlex.     Medications:  Continuous Infusions:  Scheduled Meds:   ascorbic acid (vitamin C)  500 mg Oral BID    enoxparin  40 mg Subcutaneous Q24H (prophylaxis, 1700)    [START ON 10/4/2024] ergocalciferol  50,000 Units Oral Every Fri    gabapentin  300 mg Oral BID    insulin glargine U-100  6 Units Subcutaneous BID    multivitamin  1 tablet Oral Daily    piperacillin-tazobactam (Zosyn) IV (PEDS and ADULTS) (extended infusion is not appropriate)  4.5 g Intravenous Q8H    tamsulosin  0.4 mg Oral Daily     PRN Meds:  Current Facility-Administered Medications:     acetaminophen, 650 mg, Oral, Q6H PRN    dextrose 10%, 12.5 g, Intravenous, PRN    dextrose 10%, 25 g, Intravenous, PRN    diphenhydrAMINE, 25 mg, Oral, Q6H PRN    glucagon (human recombinant), 1 mg, Intramuscular, PRN    glucose, 16 g, Oral, PRN    glucose, 24 g, Oral, PRN    HYDROmorphone, 0.2 mg, Intravenous, Q6H PRN    insulin aspart U-100, 0-5 Units, Subcutaneous, QID (AC + HS) PRN    naloxone, 0.02 mg, Intravenous, PRN    oxyCODONE, 10 mg, Oral, Q6H PRN    oxyCODONE, 5 mg, Oral, Q6H PRN    sodium chloride 0.9%, 10 mL, Intravenous, Q12H PRN    vancomycin - pharmacy to dose, , Intravenous, pharmacy to manage  frequency     Review of patient's allergies indicates:  No Known Allergies  Objective:     Vital Signs (Most Recent):  Temp: 98.7 °F (37.1 °C) (10/02/24 1145)  Pulse: 68 (10/02/24 1145)  Resp: 18 (10/02/24 1145)  BP: 130/60 (10/02/24 1145)  SpO2: (!) 92 % (10/02/24 1145) Vital Signs (24h Range):  Temp:  [98.2 °F (36.8 °C)-99.5 °F (37.5 °C)] 98.7 °F (37.1 °C)  Pulse:  [57-81] 68  Resp:  [16-18] 18  SpO2:  [92 %-100 %] 92 %  BP: (118-144)/(55-63) 130/60     Weight: 83.4 kg (183 lb 13.8 oz)  Body mass index is 27.15 kg/m².    Intake/Output - Last 3 Shifts         09/30 0700  10/01 0659 10/01 0700  10/02 0659 10/02 0700  10/03 0659    P.O. 120      I.V. (mL/kg)       Blood       IV Piggyback 100      Total Intake(mL/kg) 220 (2.6)      Urine (mL/kg/hr) 900 (0.4) 850 (0.4)     Total Output 900 850     Net -680 -850                     Physical Exam  Vitals and nursing note reviewed.   Constitutional:       General: He is not in acute distress.     Appearance: He is not ill-appearing.   Cardiovascular:      Rate and Rhythm: Normal rate.   Pulmonary:      Effort: Pulmonary effort is normal. No respiratory distress.   Musculoskeletal:      Comments: L AKA stump   Skin:     General: Skin is warm.      Comments: Left hip wound packing saturated with purulence. Repacked with WTD Kerlix.  Sacral wound with clean wound base and minimal bleeding.       Neurological:      Mental Status: He is alert.                    Significant Labs:  I have reviewed all pertinent lab results within the past 24 hours.    Significant Diagnostics:  I have reviewed all pertinent imaging results/findings within the past 24 hours.  Assessment/Plan:     Wounds, multiple  Saji Garry 75 yo male     Wound of left leg  Saji Ector 75 yo male PMHx HTN, HLD, T2DM on long-term insulin, BPH with chronic Sams in place, paroxysmal A-fib (on eliquis), CHB s/p PPM medtronic (10/12/2023), CKD3, R foot osteomyelitis and PVD with diabetic ulcer s/p left AKA  (3/27/2024). General surgery consulted for evaluations of multiple wounds.    - sacral wound and left hip wounds dressing changed and repacked  - will re-evaluate wounds tomorrow  - continue local wound care with wound care nurses   - eliquis currently being held by primary team. ok for DVT ppx  - rest of care per primary        Joya Truong MD  General Surgery  [facility] ([place])

## 2024-10-02 NOTE — ASSESSMENT & PLAN NOTE
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 General surgery consulted for evaluations of multiple wounds.    - sacral wound and left hip wounds dressing changed and repacked  - will re-evaluate wounds tomorrow  - continue local wound care with wound care nurses   - eliquis currently being held by primary team. ok for DVT ppx  - rest of care per primary

## 2024-10-02 NOTE — SUBJECTIVE & OBJECTIVE
Interval History: NAEO. ID consulted for IV abx regimen now on IV Dapto and Zosyn. Pending podiatry and ID recs.     Review of Systems  Objective:     Vital Signs (Most Recent):  Temp: 98.1 °F (36.7 °C) (10/02/24 1552)  Pulse: (!) 51 (10/02/24 1552)  Resp: 18 (10/02/24 1552)  BP: 130/61 (10/02/24 1552)  SpO2: 98 % (10/02/24 1552) Vital Signs (24h Range):  Temp:  [98.1 °F (36.7 °C)-98.9 °F (37.2 °C)] 98.1 °F (36.7 °C)  Pulse:  [51-81] 51  Resp:  [16-18] 18  SpO2:  [92 %-100 %] 98 %  BP: (128-144)/(60-63) 130/61     Weight: 83.4 kg (183 lb 13.8 oz)  Body mass index is 27.15 kg/m².    Intake/Output Summary (Last 24 hours) at 10/2/2024 1712  Last data filed at 10/2/2024 0416  Gross per 24 hour   Intake --   Output 850 ml   Net -850 ml         Physical Exam  Constitutional:       General: He is not in acute distress.     Appearance: He is ill-appearing. He is not diaphoretic.   HENT:      Head: Normocephalic and atraumatic.      Right Ear: External ear normal.      Left Ear: External ear normal.      Mouth/Throat:      Mouth: Mucous membranes are dry.   Eyes:      General:         Right eye: No discharge.         Left eye: No discharge.      Extraocular Movements: Extraocular movements intact.   Cardiovascular:      Rate and Rhythm: Normal rate. Rhythm irregular.      Heart sounds: No murmur heard.  Pulmonary:      Effort: Pulmonary effort is normal. No respiratory distress.      Breath sounds: No wheezing.   Abdominal:      General: Abdomen is flat. There is no distension.      Tenderness: Tenderness: RLQ tenderness.   Musculoskeletal:      Cervical back: Normal range of motion.      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 Surgical scar well-healed  Right lower extremity with cracked, dry skin as well as right heel wound  Multiple areas of stage 3-4 pressure ulcers noted to sacrum   Right upper erosion measuring 4x3x2 is noted to significant depth without purulent drainage  Left upper sacrum reveals 2x3x2  irregular-edged wound with brown, foul-smelling purulent drainage       Skin:     General: Skin is dry.   Neurological:      General: No focal deficit present.      Mental Status: He is alert.             Significant Labs: All pertinent labs within the past 24 hours have been reviewed.    Significant Imaging: I have reviewed all pertinent imaging results/findings within the past 24 hours.

## 2024-10-02 NOTE — NURSING
Patient refuses q2 turns. Patient educated on wound healing and prevention of further injury. Patient verbalizes understanding.

## 2024-10-02 NOTE — PROGRESS NOTES
[facility] ([place] [id number])  [facility] Medicine  Progress Note    Patient Name: Saji Castañeda  MRN: [id number]  Patient Class: IP- Inpatient   Admission Date: 9/29/2024  Length of Stay: 2 days  Attending Physician: Jm Rosa MD  Primary Care Provider: Vinod Elise MD        Subjective:     Principal Problem:Osteomyelitis        HPI:  Saji Castañeda is a 74 y.o. 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 Arellano, Chronic Multifactorial Anemia, BPH s/p Chronic Sams, paroxysmal A-fib on eliquis, CHB s/p PPM medtronic (10/12/2023), CKD3, Multifactorial HTN, HLD and Osteomyelitis of R foot presenting from [facility] with suspected infection of L hip wound. He is nonambulatory . Patient has chronic, extensive sacral wounds, a R heel wound and a wound on the L hip with purulence and a foul odor. The patient was recommended to come to the ED due to concerns of wound infection by nursing staff at NH. Patient reports intermittent pain at the wound sites that is controlled with Oxycodone. Patient states that wound care manages these sites at the nursing home. Patient is alert, oriented and responsive to questioning however he is a poor historian regarding his medical history. Per Chart Review the patient was admitted to the ED on 7/25 for AMS while on Vanc and Cefepime for Osteomyelitis. Cefepime neurotoxicity was suspected and therapy was switched to Vanc and Zosyn. He then developed  He was transferred to [facility] at that time and had a pacemaker placed. Patient reports currently being on oral antibiotic therapy but is unsure what he is taking. He reports taking his medications daily that the nurse at NH provides. He reports never smoking or using drugs and no longer drinks alcohol.      Patient reports chronic Sams cath usage for unknown reason that was last changed today. He reports an episode of dysuria  yesterday and endorses suprapubic pain on palpation. He denies hematuria, urinary frequency/urgency, urinary odor or abdominal pain.      He also reports a mild cough and chills for the past several weeks with scant sputum production but denies fever, chest pain, SOB or recent illness. He reports chronic diaphoresis at night that requires him to change clothes in the morning.      Overview/Hospital Course:   74 y.o. 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 Arellano, Chronic Multifactorial Anemia, BPH s/p Chronic Sams, paroxysmal A-fib on eliquis, CHB s/p PPM medtronic (10/12/2023), CKD3, Multifactorial HTN, HLD and Osteomyelitis of R foot presenting from [facility] with suspected infection of sacral wound. Started on Vanc and Zosyn given prior history of prior susceptibilities. General surgery plans for wound debridement 10/1. Wound care and podiatry following. X-ray of right foot c/f chronic osteomyelitis. MRI with acute osteomyelitis of the posterior 2/3 of the calcaneus and early osteomyelitis of the lateral malleolus and 1st distal phalanx.     Interval History: NAEO. ID consulted for IV abx regimen now on IV Dapto and Zosyn. Pending podiatry and ID recs.     Review of Systems  Objective:     Vital Signs (Most Recent):  Temp: 98.1 °F (36.7 °C) (10/02/24 1552)  Pulse: (!) 51 (10/02/24 1552)  Resp: 18 (10/02/24 1552)  BP: 130/61 (10/02/24 1552)  SpO2: 98 % (10/02/24 1552) Vital Signs (24h Range):  Temp:  [98.1 °F (36.7 °C)-98.9 °F (37.2 °C)] 98.1 °F (36.7 °C)  Pulse:  [51-81] 51  Resp:  [16-18] 18  SpO2:  [92 %-100 %] 98 %  BP: (128-144)/(60-63) 130/61     Weight: 83.4 kg (183 lb 13.8 oz)  Body mass index is 27.15 kg/m².    Intake/Output Summary (Last 24 hours) at 10/2/2024 1712  Last data filed at 10/2/2024 7270  Gross per 24 hour   Intake --   Output 850 ml   Net -850 ml         Physical  Exam  Constitutional:       General: He is not in acute distress.     Appearance: He is ill-appearing. He is not diaphoretic.   HENT:      Head: Normocephalic and atraumatic.      Right Ear: External ear normal.      Left Ear: External ear normal.      Mouth/Throat:      Mouth: Mucous membranes are dry.   Eyes:      General:         Right eye: No discharge.         Left eye: No discharge.      Extraocular Movements: Extraocular movements intact.   Cardiovascular:      Rate and Rhythm: Normal rate. Rhythm irregular.      Heart sounds: No murmur heard.  Pulmonary:      Effort: Pulmonary effort is normal. No respiratory distress.      Breath sounds: No wheezing.   Abdominal:      General: Abdomen is flat. There is no distension.      Tenderness: Tenderness: RLQ tenderness.   Musculoskeletal:      Cervical back: Normal range of motion.      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 Surgical scar well-healed  Right lower extremity with cracked, dry skin as well as right heel wound  Multiple areas of stage 3-4 pressure ulcers noted to sacrum   Right upper erosion measuring 4x3x2 is noted to significant depth without purulent drainage  Left upper sacrum reveals 2x3x2 irregular-edged wound with brown, foul-smelling purulent drainage       Skin:     General: Skin is dry.   Neurological:      General: No focal deficit present.      Mental Status: He is alert.             Significant Labs: All pertinent labs within the past 24 hours have been reviewed.    Significant Imaging: I have reviewed all pertinent imaging results/findings within the past 24 hours.    Assessment/Plan:      * Osteomyelitis  R heal OM diagnosed during admission 6/26-7/11 with bone cx positive for pseudomonas and MRSA. Discharged on vanc and cefepime for 6 week course.  Recent admission 8/2-8/6, at that time Cefepime discontinued due to c/f Cefepime neurotoxicity, then developed Vanc YNES, and discharged on Daptomycin and Zosyn  - extensive grade  3-4 sacral wounds, L hip wound purulence  - chronic osteomyelitis of R heel with nonhealing ulcer    - Wound Cx from L buttocks, positive for proteus susceptibilities pending  - Blood Cx NGTD  - started on IV Vanc in the ED, recommend renally dosing  - continue Vanc 1750 mg q12h   - continue Zosyn  - Wound care consulted, recommended podiatry consult, providing   - WBC elevated 13.6, likely due to wound infection  - trend CBC and CMP  - procalcitonin 0.12  - continue oxycodone and tylenol PRN for pain  - per pharmacy, obtain vanc trough on 10/01 at 6:00 before giving 4th dose, range 10-20 mcg/mL  - podiatry consulted, and reported no surgical intervention at this time   - MRI ordered , c/f acute osteomyelitis of 2/3 calcaneus and early osteomyelitis of lateral malleolus & 1st distal phalanx  - ID consulted, IV Dapto started 10/2/24    Chronic indwelling Fung catheter  Pt has failed multiple void trials in the past, now with chronic fung. Most recently replaced 9/29. C/f UTI  UA shows +1 blood, 3+ leuk esterase, >100 WBCs and few bacteria, patient reports episode of dysuria yesterday and suprapubic pain on palpation   - Ucx, NGTD  - last Fung change today         Anemia  Anemia is likely due to chronic disease due to Chronic Kidney Disease. Most recent hemoglobin and hematocrit are listed below.  Recent Labs     09/30/24  0042 09/30/24  1200 10/01/24  0249   HGB 7.2* 7.3* 7.1*   HCT 24.1* 23.8* 23.7*     Plan  - Monitor serial CBC: daily  - Transfuse PRBC if patient becomes hemodynamically unstable, symptomatic or H/H drops below 7/21.  - Patient has received 1 units of PRBCs on 9/29  - Patient's anemia is currently   7.1, below baseline 8-9  - Likely multifactorial with AOCD, as well as potentially GI bleed vs  AVM. Continue to monitor  - H&H 6.3 and 21.6   - hold iron supplement   - guaic + stool test  - Elevated ferritin, anemia 2/2 chronic disease      Sacral wound  Left buttocks wound cx: proteus positive,  susceptibles pending    Stage 3b chronic kidney disease  Creatine stable for now. BMP reviewed- noted Estimated Creatinine Clearance: 66.7 mL/min (based on SCr of 1.1 mg/dL). according to latest data. Based on current GFR, CKD stage is stage 3 - GFR 30-59.  Monitor UOP and serial BMP and adjust therapy as needed. Renally dose meds. Avoid nephrotoxic medications and procedures.    - hold nephrotoxic medications  - hold Lasix at this time   - renally dose Vanc    Paroxysmal atrial fibrillation  Patient with Paroxysmal (<7 days) atrial fibrillation which is uncontrolled. Patient is currently in atrial fibrillation.      echo on 7/2024 showed EF of 55-60% with no significant valvular abnormalities   - follows with Vascular Surgeon Dr. Banda  - hold home Lasix and HCTZ, not volume overloaded   - electrolytes stable, trend CMP  - hold home Eliquis due to unstable H&H, trend CBC  - monitor Afib, continuous telemetry     Wound of left leg  Left hip wound with purulent foul smelling drainage    - Wound cx ordered, f/u results  - On Vanc/Zosy  - Gen surg consulted for debridement , plans for 10/1          Type 2 diabetes mellitus, with long-term current use of insulin   Hb A1c on 7/25 was 6  - Lantus 12 units BID  - continue LDSSI   - POCT glucose checks before meals and nightly      Essential hypertension  Chronic, uncontrolled. Latest blood pressure and vitals reviewed-     Temp:  [98.4 °F (36.9 °C)-98.9 °F (37.2 °C)]   Pulse:  [61-85]   Resp:  [16-18]   BP: (104-122)/(53-68)   SpO2:  [98 %-100 %] .   Home meds for hypertension were reviewed and noted below.   Hypertension Medications               furosemide (LASIX) 20 MG tablet Take 1 tablet (20 mg total) by mouth once daily.    NIFEdipine (PROCARDIA-XL) 90 MG (OSM) 24 hr tablet Take 1 tablet (90 mg total) by mouth once daily.            While in the hospital, will manage blood pressure as follows; Adjust home antihypertensive regimen as follows- home home  Nifedipine    Will utilize p.r.n. blood pressure medication only if patient's blood pressure greater than 180/110 and he develops symptoms such as worsening chest pain or shortness of breath.     - hold diuretics, no signs of volume overload           VTE Risk Mitigation (From admission, onward)           Ordered     enoxaparin injection 40 mg  Every 24 hours         09/30/24 1935     Reason for No Pharmacological VTE Prophylaxis  Once        Question:  Reasons:  Answer:  Risk of Bleeding  Comment:  c/f bleeding with drop in Hgb    09/29/24 1914     IP VTE HIGH RISK PATIENT  Once         09/29/24 1914     Place sequential compression device  Until discontinued         09/29/24 1914                    Discharge Planning   OXANA: 10/7/2024     Code Status: Full Code   Is the patient medically ready for discharge?:     Reason for patient still in hospital (select all that apply): Patient trending condition  Discharge Plan A: Return to nursing home ([place] [phone])                  Nayeli Urena DO  Department of Hospital Medicine   [facility] ([place]-)

## 2024-10-02 NOTE — PROGRESS NOTES
VANCOMYCIN DOSING BY PHARMACY DISCONTINUATION NOTE    Saji Castañeda is a 74 y.o. male who had been consulted for vancomycin dosing.    The pharmacy consult for vancomycin dosing has been discontinued.     Vancomycin Dosing by Pharmacy Consult will sign-off. Please reconsult if necessary. Thank you for allowing us to participate in this patient's care.     Cruzito Galarza Pharm.D.  [id number]

## 2024-10-02 NOTE — MEDICAL/APP STUDENT
"Saji Castañeda is a 74 y.o. 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 Arellano, Chronic Multifactorial Anemia, BPH s/p Chronic Sams, paroxysmal A-fib on eliquis, CHB s/p PPM medtronic (10/12/2023), CKD3, Multifactorial HTN, HLD and Osteomyelitis of R foot presenting from [facility] with suspected infection of L hip wound. He had a recent admission with micro notable for  pseudomonas (zosyn sensitive) and MRSA. Started on vanc/zosyn. Holding apixaban. Wound care, podiatry and Gen surg following.      Patient has no new complaints today. Pain is well controlled on pain regimen. Gen Surgery performed drainage of L hip abscess and debridement of sacral wound 10/1. Podiatry consulted recommended MRI of R foot, which revealed acute osteomyelitis of the posterior 2/3 of the calcaneous and early osteo of the lateral malleolus and 1st distal phalanx, recommending medial tx, no planned intervention. ID consulted.     Active Hospital Problems    Diagnosis  POA    *Osteomyelitis [M86.9]  Yes    Thrombocytosis [D75.839]  Yes    Anemia [D64.9]  Yes    Chronic indwelling Sams catheter [Z97.8]  Not Applicable    Sacral wound [S31.000A]  Yes    Stage 3b chronic kidney disease [N18.32]  Yes    Paroxysmal atrial fibrillation [I48.0]  Yes     Chronic    Wound of left leg [S81.802A]  Yes    Type 2 diabetes mellitus, with long-term current use of insulin [E11.9, Z79.4]  Not Applicable    Essential hypertension [I10]  Yes     Chronic      Resolved Hospital Problems   No resolved problems to display.     Temp: 98.7 °F (37.1 °C) (10/02/24 1145)  Pulse: 68 (10/02/24 1145)  Resp: 16 (10/02/24 1222)  BP: 130/60 (10/02/24 1145)  SpO2: (!) 92 % (10/02/24 1145)  Weight: 83.4 kg (183 lb 13.8 oz) (09/29/24 2000)  Height: 5' 9" (175.3 cm) (09/29/24 2000)    Review of Systems   Constitutional:  Negative for chills, diaphoresis and fever. "   HENT:  Negative for sinus pain, sore throat and tinnitus.    Eyes:  Negative for blurred vision, double vision and redness.   Respiratory:  Negative for cough, hemoptysis, shortness of breath and stridor.    Cardiovascular:  Negative for chest pain, palpitations and leg swelling.   Gastrointestinal:  Negative for abdominal pain, heartburn, nausea and vomiting.   Genitourinary:  Negative for dysuria, frequency and urgency.   Musculoskeletal:  Negative for myalgias and neck pain.   Skin:  Negative for itching and rash.        Sacral wound pain   Neurological:  Negative for dizziness and headaches.   Endo/Heme/Allergies:  Negative for environmental allergies and polydipsia. Bruises/bleeds easily.   Psychiatric/Behavioral:  Negative for depression and hallucinations.        Physical Exam  Constitutional:       General: He is not in acute distress.     Appearance: Normal appearance. He is normal weight. He is not ill-appearing, toxic-appearing or diaphoretic.   HENT:      Head: Normocephalic and atraumatic.      Nose: No congestion or rhinorrhea.   Eyes:      General: No scleral icterus.     Pupils: Pupils are equal, round, and reactive to light.   Cardiovascular:      Rate and Rhythm: Normal rate. Rhythm irregular.      Pulses: Normal pulses.      Heart sounds: Normal heart sounds. No murmur heard.     No friction rub. No gallop.   Pulmonary:      Effort: Pulmonary effort is normal.      Breath sounds: Normal breath sounds.   Abdominal:      General: Abdomen is flat. There is no distension.      Palpations: Abdomen is soft.      Tenderness: There is no abdominal tenderness.   Musculoskeletal:         General: Swelling and deformity present. Normal range of motion.      Cervical back: Normal range of motion. No rigidity or tenderness.      Right lower leg: No edema.   Skin:     General: Skin is warm and dry.      Coloration: Skin is not jaundiced.      Findings: No rash.      Comments: Sacral wounds and R heel wound    Neurological:      General: No focal deficit present.      Mental Status: He is alert and oriented to person, place, and time. Mental status is at baseline.      Cranial Nerves: No cranial nerve deficit.      Sensory: Sensory deficit present.   Psychiatric:         Mood and Affect: Mood normal.         Behavior: Behavior normal.         Thought Content: Thought content normal.         Judgment: Judgment normal.         Assessment and Plan    Assessment and Plan  Chronic Osteomyelitis of R heel  MRSA history  History of Carbapenem resistant Pseudomonas   - Recent history of IV Vanc and Zoysn 7/25  - extensive grade 3-4 sacral wounds, L hip wound purulence  - blood cultures no growth x3 days  - CXR shows coarse interstitial attenuation, similar to prior  - WBC elevated 23.2 9/30, trending down, 10.08 l  - wound cultures reveal proteus mirabilis, sensitive to Zoysn  - continue oxycodone and tylenol PRN for pain  - Gen Surgery following, performed drainage of L hip abscess and debridement of sacral wound 10/1  - Podiatry consulted, MRI revealed acute osteomyelitis of the posterior 2/3 of the calcaneous and early osteo of the lateral malleolus and 1st distal phalanx, recommending medial tx, no planned intervention. ID consulted, appreciate recommendations  - d/c Vanc, switched to Daptomycin q24 h, continue Zosyn therapy, do not recommend Cefepime due to hx of neurotoxicity       Paroxysmal Atrial Fibrillation on Eliquis   Pacemaker   PVD  CHF  - echo on 7/2024 showed EF of 55-60% with no significant valvular abnormalities   - follows with Vascular Surgeon Dr. Banda  - continue home Lasix and hold HCTZ, some UE edema  - electrolytes stable, trend CMP  - hold Eliquis due to low H&H   - begin Heparin drip for DVT Ppx  - monitor Afib, continuous telemetry      Anemia of Chronic Disease   Acute Anemia   - H&H 6.8 and 22.1  - pending 1 unit of PRBCs 10/2  - transfused 1 unit of PRBCs in ED  - hold iron supplement       Below Knee Amputation 3/24  - due to nonhealing L foot wound   - well-healed scar   - patient is non-ambulatory      Chronic Sams  Suspected Urinary Tract Infection  - UA shows +1 blood, 3+ leuk esterase, >100 WBCs and few bacteria, patient reports episode of dysuria yesterday and suprapubic pain on palpation   - culture shows no growth x1 day  - last Sams change 10/29        Type 2 Diabetes Mellitus on Insulin   - Hb A1c on 7/25 was 6  - continue insulin sliding scale      CKD3  - hold nephrotoxic medications  - continue Lasix at this time, hold HCTZ  - renally dose Vanc     HTN  - normotensive at this time     Hypoalbuminemia   - albumin 1.2       Raegan Melvin  10/2/2024

## 2024-10-03 LAB
ABO + RH BLD: NORMAL
ALBUMIN SERPL BCP-MCNC: 1.3 G/DL (ref 3.5–5.2)
ALP SERPL-CCNC: 147 U/L (ref 55–135)
ALT SERPL W/O P-5'-P-CCNC: 19 U/L (ref 10–44)
ANION GAP SERPL CALC-SCNC: 8 MMOL/L (ref 8–16)
AST SERPL-CCNC: 15 U/L (ref 10–40)
BASOPHILS # BLD AUTO: 0.04 K/UL (ref 0–0.2)
BASOPHILS # BLD AUTO: 0.06 K/UL (ref 0–0.2)
BASOPHILS NFR BLD: 0.4 % (ref 0–1.9)
BASOPHILS NFR BLD: 0.4 % (ref 0–1.9)
BILIRUB SERPL-MCNC: 0.2 MG/DL (ref 0.1–1)
BLD GP AB SCN CELLS X3 SERPL QL: NORMAL
BLD PROD TYP BPU: NORMAL
BLOOD UNIT EXPIRATION DATE: NORMAL
BLOOD UNIT TYPE CODE: 6200
BLOOD UNIT TYPE: NORMAL
BUN SERPL-MCNC: 18 MG/DL (ref 8–23)
CALCIUM SERPL-MCNC: 8 MG/DL (ref 8.7–10.5)
CHLORIDE SERPL-SCNC: 105 MMOL/L (ref 95–110)
CO2 SERPL-SCNC: 23 MMOL/L (ref 23–29)
CODING SYSTEM: NORMAL
CREAT SERPL-MCNC: 0.9 MG/DL (ref 0.5–1.4)
CROSSMATCH INTERPRETATION: NORMAL
DIFFERENTIAL METHOD BLD: ABNORMAL
DIFFERENTIAL METHOD BLD: ABNORMAL
DISPENSE STATUS: NORMAL
EOSINOPHIL # BLD AUTO: 0.5 K/UL (ref 0–0.5)
EOSINOPHIL # BLD AUTO: 0.6 K/UL (ref 0–0.5)
EOSINOPHIL NFR BLD: 3.1 % (ref 0–8)
EOSINOPHIL NFR BLD: 5.8 % (ref 0–8)
ERYTHROCYTE [DISTWIDTH] IN BLOOD BY AUTOMATED COUNT: 16.4 % (ref 11.5–14.5)
ERYTHROCYTE [DISTWIDTH] IN BLOOD BY AUTOMATED COUNT: 16.8 % (ref 11.5–14.5)
EST. GFR  (NO RACE VARIABLE): >60 ML/MIN/1.73 M^2
GLUCOSE SERPL-MCNC: 176 MG/DL (ref 70–110)
GRAM STN SPEC: NORMAL
GRAM STN SPEC: NORMAL
HCT VFR BLD AUTO: 22.3 % (ref 40–54)
HCT VFR BLD AUTO: 27.3 % (ref 40–54)
HGB BLD-MCNC: 6.8 G/DL (ref 14–18)
HGB BLD-MCNC: 8.2 G/DL (ref 14–18)
IMM GRANULOCYTES # BLD AUTO: 0.04 K/UL (ref 0–0.04)
IMM GRANULOCYTES # BLD AUTO: 0.08 K/UL (ref 0–0.04)
IMM GRANULOCYTES NFR BLD AUTO: 0.4 % (ref 0–0.5)
IMM GRANULOCYTES NFR BLD AUTO: 0.5 % (ref 0–0.5)
LYMPHOCYTES # BLD AUTO: 1.2 K/UL (ref 1–4.8)
LYMPHOCYTES # BLD AUTO: 1.4 K/UL (ref 1–4.8)
LYMPHOCYTES NFR BLD: 11 % (ref 18–48)
LYMPHOCYTES NFR BLD: 8.4 % (ref 18–48)
MAGNESIUM SERPL-MCNC: 1.9 MG/DL (ref 1.6–2.6)
MCH RBC QN AUTO: 25.2 PG (ref 27–31)
MCH RBC QN AUTO: 26.1 PG (ref 27–31)
MCHC RBC AUTO-ENTMCNC: 30 G/DL (ref 32–36)
MCHC RBC AUTO-ENTMCNC: 30.5 G/DL (ref 32–36)
MCV RBC AUTO: 83 FL (ref 82–98)
MCV RBC AUTO: 87 FL (ref 82–98)
MONOCYTES # BLD AUTO: 0.5 K/UL (ref 0.3–1)
MONOCYTES # BLD AUTO: 0.7 K/UL (ref 0.3–1)
MONOCYTES NFR BLD: 3.9 % (ref 4–15)
MONOCYTES NFR BLD: 5 % (ref 4–15)
NEUTROPHILS # BLD AUTO: 14.4 K/UL (ref 1.8–7.7)
NEUTROPHILS # BLD AUTO: 8.2 K/UL (ref 1.8–7.7)
NEUTROPHILS NFR BLD: 77.4 % (ref 38–73)
NEUTROPHILS NFR BLD: 83.7 % (ref 38–73)
NRBC BLD-RTO: 0 /100 WBC
NRBC BLD-RTO: 0 /100 WBC
PHOSPHATE SERPL-MCNC: 3.5 MG/DL (ref 2.7–4.5)
PLATELET # BLD AUTO: 475 K/UL (ref 150–450)
PLATELET # BLD AUTO: 503 K/UL (ref 150–450)
PMV BLD AUTO: 8.2 FL (ref 9.2–12.9)
PMV BLD AUTO: 8.5 FL (ref 9.2–12.9)
POCT GLUCOSE: 172 MG/DL (ref 70–110)
POCT GLUCOSE: 217 MG/DL (ref 70–110)
POCT GLUCOSE: 237 MG/DL (ref 70–110)
POTASSIUM SERPL-SCNC: 3.7 MMOL/L (ref 3.5–5.1)
PROT SERPL-MCNC: 6 G/DL (ref 6–8.4)
RBC # BLD AUTO: 2.7 M/UL (ref 4.6–6.2)
RBC # BLD AUTO: 3.14 M/UL (ref 4.6–6.2)
SODIUM SERPL-SCNC: 136 MMOL/L (ref 136–145)
SPECIMEN OUTDATE: NORMAL
TRANS ERYTHROCYTES VOL PATIENT: NORMAL ML
WBC # BLD AUTO: 10.55 K/UL (ref 3.9–12.7)
WBC # BLD AUTO: 17.11 K/UL (ref 3.9–12.7)

## 2024-10-03 PROCEDURE — P9021 RED BLOOD CELLS UNIT: HCPCS | Mod: HCNC

## 2024-10-03 PROCEDURE — 20600001 HC STEP DOWN PRIVATE ROOM: Mod: HCNC

## 2024-10-03 PROCEDURE — 87075 CULTR BACTERIA EXCEPT BLOOD: CPT | Mod: HCNC

## 2024-10-03 PROCEDURE — 85025 COMPLETE CBC W/AUTO DIFF WBC: CPT | Mod: 91,HCNC | Performed by: STUDENT IN AN ORGANIZED HEALTH CARE EDUCATION/TRAINING PROGRAM

## 2024-10-03 PROCEDURE — 88311 DECALCIFY TISSUE: CPT | Mod: HCNC | Performed by: PATHOLOGY

## 2024-10-03 PROCEDURE — 87070 CULTURE OTHR SPECIMN AEROBIC: CPT | Mod: HCNC

## 2024-10-03 PROCEDURE — 63600175 PHARM REV CODE 636 W HCPCS: Mod: HCNC | Performed by: PHYSICIAN ASSISTANT

## 2024-10-03 PROCEDURE — 87186 SC STD MICRODIL/AGAR DIL: CPT | Mod: 59,HCNC

## 2024-10-03 PROCEDURE — 83735 ASSAY OF MAGNESIUM: CPT | Mod: HCNC

## 2024-10-03 PROCEDURE — 88305 TISSUE EXAM BY PATHOLOGIST: CPT | Mod: HCNC | Performed by: PATHOLOGY

## 2024-10-03 PROCEDURE — 63600175 PHARM REV CODE 636 W HCPCS: Mod: HCNC

## 2024-10-03 PROCEDURE — 25000003 PHARM REV CODE 250: Mod: HCNC

## 2024-10-03 PROCEDURE — 87077 CULTURE AEROBIC IDENTIFY: CPT | Mod: HCNC

## 2024-10-03 PROCEDURE — 80053 COMPREHEN METABOLIC PANEL: CPT | Mod: HCNC

## 2024-10-03 PROCEDURE — 99233 SBSQ HOSP IP/OBS HIGH 50: CPT | Mod: HCNC,,, | Performed by: PHYSICIAN ASSISTANT

## 2024-10-03 PROCEDURE — 86901 BLOOD TYPING SEROLOGIC RH(D): CPT | Mod: HCNC

## 2024-10-03 PROCEDURE — 0QBL0ZZ EXCISION OF RIGHT TARSAL, OPEN APPROACH: ICD-10-PCS | Performed by: PODIATRIST

## 2024-10-03 PROCEDURE — 86920 COMPATIBILITY TEST SPIN: CPT | Mod: HCNC

## 2024-10-03 PROCEDURE — 36415 COLL VENOUS BLD VENIPUNCTURE: CPT | Mod: HCNC,XB | Performed by: STUDENT IN AN ORGANIZED HEALTH CARE EDUCATION/TRAINING PROGRAM

## 2024-10-03 PROCEDURE — 88305 TISSUE EXAM BY PATHOLOGIST: CPT | Mod: 26,HCNC,, | Performed by: PATHOLOGY

## 2024-10-03 PROCEDURE — 0QBL3ZX EXCISION OF RIGHT TARSAL, PERCUTANEOUS APPROACH, DIAGNOSTIC: ICD-10-PCS | Performed by: PODIATRIST

## 2024-10-03 PROCEDURE — 84100 ASSAY OF PHOSPHORUS: CPT | Mod: HCNC

## 2024-10-03 PROCEDURE — 36415 COLL VENOUS BLD VENIPUNCTURE: CPT | Mod: HCNC

## 2024-10-03 PROCEDURE — 25000003 PHARM REV CODE 250: Mod: HCNC | Performed by: PHYSICIAN ASSISTANT

## 2024-10-03 PROCEDURE — 86850 RBC ANTIBODY SCREEN: CPT | Mod: HCNC

## 2024-10-03 PROCEDURE — 27000207 HC ISOLATION: Mod: HCNC

## 2024-10-03 PROCEDURE — 87205 SMEAR GRAM STAIN: CPT | Mod: HCNC

## 2024-10-03 PROCEDURE — 85025 COMPLETE CBC W/AUTO DIFF WBC: CPT | Mod: HCNC

## 2024-10-03 PROCEDURE — 86900 BLOOD TYPING SEROLOGIC ABO: CPT | Mod: HCNC

## 2024-10-03 PROCEDURE — 30233N1 TRANSFUSION OF NONAUTOLOGOUS RED BLOOD CELLS INTO PERIPHERAL VEIN, PERCUTANEOUS APPROACH: ICD-10-PCS | Performed by: HOSPITALIST

## 2024-10-03 RX ORDER — HYDROCODONE BITARTRATE AND ACETAMINOPHEN 500; 5 MG/1; MG/1
TABLET ORAL
Status: DISCONTINUED | OUTPATIENT
Start: 2024-10-03 | End: 2024-10-04

## 2024-10-03 RX ORDER — SENNOSIDES 8.6 MG/1
8.6 TABLET ORAL DAILY
Status: DISCONTINUED | OUTPATIENT
Start: 2024-10-03 | End: 2024-10-09 | Stop reason: HOSPADM

## 2024-10-03 RX ORDER — ZINC SULFATE 50(220)MG
220 CAPSULE ORAL DAILY
Status: DISCONTINUED | OUTPATIENT
Start: 2024-10-03 | End: 2024-10-09 | Stop reason: HOSPADM

## 2024-10-03 RX ORDER — POLYETHYLENE GLYCOL 3350 17 G/17G
17 POWDER, FOR SOLUTION ORAL DAILY
Status: DISCONTINUED | OUTPATIENT
Start: 2024-10-03 | End: 2024-10-09 | Stop reason: HOSPADM

## 2024-10-03 RX ADMIN — FUROSEMIDE 20 MG: 20 TABLET ORAL at 08:10

## 2024-10-03 RX ADMIN — INSULIN GLARGINE 6 UNITS: 100 INJECTION, SOLUTION SUBCUTANEOUS at 08:10

## 2024-10-03 RX ADMIN — POLYETHYLENE GLYCOL 3350 17 G: 17 POWDER, FOR SOLUTION ORAL at 10:10

## 2024-10-03 RX ADMIN — OXYCODONE HYDROCHLORIDE 10 MG: 5 TABLET ORAL at 08:10

## 2024-10-03 RX ADMIN — POTASSIUM BICARBONATE 30 MEQ: 391 TABLET, EFFERVESCENT ORAL at 08:10

## 2024-10-03 RX ADMIN — THERA TABS 1 TABLET: TAB at 08:10

## 2024-10-03 RX ADMIN — GABAPENTIN 300 MG: 300 CAPSULE ORAL at 08:10

## 2024-10-03 RX ADMIN — SENNOSIDES 8.6 MG: 8.6 TABLET, FILM COATED ORAL at 10:10

## 2024-10-03 RX ADMIN — TAMSULOSIN HYDROCHLORIDE 0.4 MG: 0.4 CAPSULE ORAL at 08:10

## 2024-10-03 RX ADMIN — PIPERACILLIN SODIUM AND TAZOBACTAM SODIUM 4.5 G: 4; .5 INJECTION, POWDER, FOR SOLUTION INTRAVENOUS at 10:10

## 2024-10-03 RX ADMIN — OXYCODONE HYDROCHLORIDE AND ACETAMINOPHEN 500 MG: 500 TABLET ORAL at 08:10

## 2024-10-03 RX ADMIN — ZINC SULFATE 220 MG (50 MG) CAPSULE 220 MG: CAPSULE at 10:10

## 2024-10-03 RX ADMIN — ENOXAPARIN SODIUM 40 MG: 40 INJECTION SUBCUTANEOUS at 04:10

## 2024-10-03 RX ADMIN — PIPERACILLIN SODIUM AND TAZOBACTAM SODIUM 4.5 G: 4; .5 INJECTION, POWDER, FOR SOLUTION INTRAVENOUS at 06:10

## 2024-10-03 RX ADMIN — DAPTOMYCIN 665 MG: 350 INJECTION, POWDER, LYOPHILIZED, FOR SOLUTION INTRAVENOUS at 12:10

## 2024-10-03 RX ADMIN — PIPERACILLIN SODIUM AND TAZOBACTAM SODIUM 4.5 G: 4; .5 INJECTION, POWDER, FOR SOLUTION INTRAVENOUS at 01:10

## 2024-10-03 RX ADMIN — OXYCODONE HYDROCHLORIDE 10 MG: 5 TABLET ORAL at 12:10

## 2024-10-03 NOTE — ASSESSMENT & PLAN NOTE
73 yo male admitted with R heel wound with MRI showing calcaneal osteo and pelvic wounds.  L hip wound debrided and cultures show proteus m. Per gen sx, L hip wound does not probe to bone. POD following.  Afebrile and WBC WNL.  Blood cultures NGTD.       Plan:  Start daptomycin and continue zosyn  Rec Pelvis imaging to assess for osteo/abscess  Rec calcaneal biopsy  Trend CRP  FU cultures  Will follow

## 2024-10-03 NOTE — PLAN OF CARE
Discharge Plan A and Plan B have been determined by review of patient's clinical status, future medical and therapeutic needs, and coverage/benefits for post-acute care in coordination with multidisciplinary team members.    10/03/24 0913   Discharge Reassessment   Assessment Type Discharge Planning Reassessment   Did the patient's condition or plan change since previous assessment? No   Discharge Plan discussed with: Patient;Sibling   Name(s) and Number(s) Kandy Castañeda (Sister)  [phone] (Home Phone)   Communicated OXANA with patient/caregiver Yes   Discharge Plan A Long-term acute care facility (LTAC)   DME Needed Upon Discharge  other (see comments)  (TBD)   Transition of Care Barriers Mobility   Why the patient remains in the hospital Requires continued medical care   Post-Acute Status   Post-Acute Authorization Placement   Post-Acute Placement Status Pending payor medical review/second level review  ([id number])   Patient choice form signed by patient/caregiver List from CMS Compare;List with quality metrics by geographic area provided   Discharge Delays None known at this time     CM met with patient and spoke with sister January MEDINA via phone  #[phone]  to discuss discharge planning.  Patients plan is to dc to LTAC    OXANA: 10/7/24    0900 am  CM received secure chat  from  Brandy ALLISON  with [facility]  P2P department offering a P2P for LTAC request for this patient. CM notified Dr Rosa  who in turn will  participate in the P2P. This CM provided Dr Rosa  with date and time : 10/3/24 @ 2:30 pm with Dr Castaneda  . CM will follow for determination.         TREATMENT PLANS:     Osteomyelitis     Plan:  Start daptomycin and continue zosyn  Rec Pelvis imaging to assess for osteo/abscess  Rec calcaneal biopsy  Trend CRP  FU cultures  Will follow    Wounds, multiple Wound of left leg    sacral wound and left hip wounds dressing changed and repacked  - will re-evaluate wounds tomorrow  - continue local wound  care with wound care nurses   - eliquis currently being held by primary team. ok for DVT ppx  - rest of care per primary   Debridement - 2nd Wound - General Location: sacrum. 10/01/24    LABS:  Albumin 1.3  PAB  1.7      Follow up: TBD      Referrals: TBD     Keerthi Alex RN  Case Management  [facility]  [phone]

## 2024-10-03 NOTE — ASSESSMENT & PLAN NOTE
Chronic ulceration of R posterior heel with mixture of granular and necrotic tissue WBC elevated. VSS, afebrile.  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 MRI shows OM to posterior calcaneus, lateral malleolus, and distal phalanx.  LE US arterial shows no stenosis.  IDSA mild/moderate foot wound infection.    Plan:  -Physical exam and imaging findings discussed with the patient.  Discussed with the patient that he has om 2 R calcaneus.  No stenosis to RLE.  -Bone biopsy and debridement to R heel.  For procedure note above  -Recommend continuing with extensive wound care and abx  -Abx per Primary/ID  -R heel dressed with Betadine-soaked gauze, ABD pad, Kerlix, and ACE  -Wound care orders updated  -Podiatry will continue to follow up    Future Discharge Recommendations  -Patient to follow up in outpatient Podiatry clinic. Podiatry will schedule  -Antibiotics per Primary/ID  -HH/SNF to do dressings 2-3 times a week as follows:  Rinsed R heel wound with Vashe and pat dry.  Dress wound with Hydrofera blue, Kerlix, and ACE wrap.  -Keep dressings clean, dry, and intact until follow-up appointment

## 2024-10-03 NOTE — ASSESSMENT & PLAN NOTE
R heal OM diagnosed during admission 6/26-7/11 with bone cx positive for pseudomonas and MRSA. Discharged on vanc and cefepime for 6 week course.  Recent admission 8/2-8/6, at that time Cefepime discontinued due to c/f Cefepime neurotoxicity, then developed Vanc YNES, and discharged on Daptomycin and Zosyn  - extensive grade 3-4 sacral wounds, L hip wound purulence  - chronic osteomyelitis of R heel with nonhealing ulcer    - Wound Cx from L buttocks, positive for proteus susceptibilities pending  - Blood Cx NGTD  - started on IV Vanc in the ED, recommend renally dosing  - continue Vanc 1750 mg q12h   - continue Zosyn  - Wound care consulted, recommended podiatry consult, providing   - WBC elevated 13.6, likely due to wound infection  - trend CBC and CMP  - procalcitonin 0.12  - continue oxycodone and tylenol PRN for pain  - per pharmacy, obtain vanc trough on 10/01 at 6:00 before giving 4th dose, range 10-20 mcg/mL  - podiatry consulted, performed bone biopsy and debridement of the R heel on 10/3  - MRI ordered , c/f acute osteomyelitis of 2/3 calcaneus and early osteomyelitis of lateral malleolus & 1st distal phalanx  - ID consulted, IV Dapto started 10/2/24

## 2024-10-03 NOTE — SUBJECTIVE & OBJECTIVE
Interval History:   No acute events  Afebrile and white blood cell count normal  Blood cultures no growth to date  Wound culture Proteus mirabilis  Bone culture right heel in process  Sacral and pain slightly improved  General surgery notes purulence from left hip wound  Denies fevers chills sweats    Review of Systems   Constitutional:  Negative for chills, diaphoresis and fever.   Respiratory:  Negative for shortness of breath.    Cardiovascular:  Negative for chest pain.   Gastrointestinal:  Negative for abdominal pain, diarrhea, nausea and vomiting.   Genitourinary:  Negative for dysuria and hematuria.   Skin:  Positive for wound.     Objective:     Vital Signs (Most Recent):  Temp: 98.1 °F (36.7 °C) (10/03/24 1526)  Pulse: 66 (10/03/24 1602)  Resp: 16 (10/03/24 1526)  BP: (!) 169/74 (10/03/24 1526)  SpO2:  (NO 02 PROBE ON) (10/03/24 1602) Vital Signs (24h Range):  Temp:  [97.7 °F (36.5 °C)-98.9 °F (37.2 °C)] 98.1 °F (36.7 °C)  Pulse:  [55-84] 66  Resp:  [16-18] 16  SpO2:  [97 %-100 %] 99 %  BP: (120-169)/(58-74) 169/74     Weight: 83.4 kg (183 lb 13.8 oz)  Body mass index is 27.15 kg/m².    Estimated Creatinine Clearance: 70.9 mL/min (based on SCr of 0.9 mg/dL).     Physical Exam  Constitutional:       General: He is not in acute distress.     Appearance: He is well-developed. He is ill-appearing. He is not toxic-appearing or diaphoretic.       HENT:      Head: Normocephalic and atraumatic.   Cardiovascular:      Rate and Rhythm: Normal rate and regular rhythm.      Heart sounds: Normal heart sounds. No murmur heard.     No friction rub. No gallop.   Pulmonary:      Effort: Pulmonary effort is normal. No respiratory distress.      Breath sounds: Normal breath sounds. No wheezing or rales.   Abdominal:      General: Bowel sounds are normal. There is no distension.      Palpations: Abdomen is soft. There is no mass.      Tenderness: There is no abdominal tenderness. There is no guarding or rebound.   Skin:      General: Skin is warm and dry.   Neurological:      Mental Status: He is alert and oriented to person, place, and time.   Psychiatric:         Behavior: Behavior normal.                            Significant Labs: Blood Culture:   Recent Labs   Lab 06/28/24  2251 06/28/24  2252 07/25/24  2114 07/25/24  2115 09/29/24  1446   LABBLOO No growth after 5 days. No growth after 5 days. No growth after 5 days. No growth after 5 days. No Growth to date  No Growth to date  No Growth to date  No Growth to date  No Growth to date  No Growth to date  No Growth to date  No Growth to date     CBC:   Recent Labs   Lab 10/02/24  0514 10/03/24  0359   WBC 10.08 10.55   HGB 6.8* 6.8*   HCT 22.1* 22.3*   * 475*     CMP:   Recent Labs   Lab 10/02/24  0514 10/03/24  0359    136   K 3.7 3.7    105   CO2 26 23   * 176*   BUN 23 18   CREATININE 1.1 0.9   CALCIUM 8.2* 8.0*   PROT 6.3 6.0   ALBUMIN 1.4* 1.3*   BILITOT 0.3 0.2   ALKPHOS 179* 147*   AST 18 15   ALT 25 19   ANIONGAP 10 8     Urine Culture:   Recent Labs   Lab 05/08/24 2121 05/19/24 2102 07/25/24  1257 09/29/24  1722   LABURIN PROVIDENCIA STUARTII  >100,000 cfu/ml  * CANDIDA GLABRATA  10,000 - 49,999 cfu/ml  Treatment of asymptomatic candiduria is not recommended (except for   specific populations). Candida isolated in the urine typically   represents colonization. If an indwelling urinary catheter is present  it should be removed or replaced.  * No growth No growth     Urine Studies:   Recent Labs   Lab 08/05/24  1515 09/29/24  1722   COLORU Yellow Colorless*   APPEARANCEUA Hazy* Clear   PHUR 6.0 6.0   SPECGRAV 1.015 1.010   PROTEINUA 1+* Negative   GLUCUA Negative Negative   KETONESU Negative Negative   BILIRUBINUA Negative Negative   OCCULTUA 1+* 1+*   NITRITE Negative Negative   UROBILINOGEN Negative  --    LEUKOCYTESUR 3+* 3+*   RBCUA 25* 15*   WBCUA >100* >100*   BACTERIA Occasional Few*   SQUAMEPITHEL  --  1   HYALINECASTS 0  --       Wound Culture:   Recent Labs   Lab 07/01/24  1241 09/29/24  1628   LABAERO METHICILLIN RESISTANT STAPHYLOCOCCUS AUREUS  Few  *  PSEUDOMONAS AERUGINOSA   Few  * PROTEUS MIRABILIS  Few  Skin bossman also present  *     All pertinent labs within the past 24 hours have been reviewed.    Significant Imaging: I have reviewed all pertinent imaging results/findings within the past 24 hours.  MRI Midfoot WO Contrast RT [[id number]] (Abnormal) Resulted: 10/01/24 1210   Order Status: Completed Updated: 10/01/24 1213   Narrative:     EXAMINATION:  MRI FOREFOOT WO CONTRAST RT; MRI HINDFOOT WO CONTRAST RT; MRI MIDFOOT WO CONTRAST RT    CLINICAL HISTORY:  Osteomyelitis, foot;    TECHNIQUE:  MRI of the entire right foot with multiplanar and multisequence imaging.  No intravenous contrast was administered.    COMPARISON:  Right foot radiograph 09/30/2024, right foot CT 06/28/2024, and right foot MRI 05/16/2023.    FINDINGS:  Exam limited by motion artifact.    BONES: Confluent marrow edema throughout the posterior 2/3 of the calcaneus with corresponding T1 medullary hypointensity and suspected posterior cortical irregularity.  Comfort marrow edema throughout the lateral half of the lateral malleolus with grossly preserved cortical margins and T1 medullary signal.  Confluent marrow edema throughout the 1st distal phalanx with preserved T1 medullary signal and cortical margins.  Contour irregularity of the 5th metatarsal shaft, possibly sequela of a healed fracture or remote infection.  No acute fractures.  No avascular necrosis.    JOINTS: Scattered hindfoot, midfoot and forefoot osteoarthritis.  No subluxation or dislocation.  No joint effusions.    TENDONS: Full-thickness, partial width tearing of the insertional fibers of the Achilles tendon.  Posterior tibial, flexor digitorum longus, flexor hallucis longus, peroneus longus, peroneus brevis and extensor tendons appear grossly intact.    MUSCLES: Severe fatty degeneration  and patchy edema of the visualized musculature, likely sequela of chronic denervation:    MISCELLANEOUS: Large soft tissue ulcer overlying the posterior and lateral aspects of the calcaneus.  Soft tissue ulceration overlying the lateral malleolus and 1st distal phalanx.  Generalized subcutaneous edema most pronounced along the dorsum of the foot.  No fluid collection.  Chronic thickening of the plantar aponeurosis.   Impression:       1. Acute osteomyelitis of the posterior 2/3 of the calcaneus with a large overlying soft tissue ulcer that involves the distal insertional fibers of the Achilles tendon.  2. Early osteomyelitis of the lateral malleolus and 1st distal phalanx with overlying soft tissue ulcers.  3. Additional findings, as above.  This report was flagged in Epic as abnormal.    Electronically signed by resident: Raegan Melvin  Date: 10/01/2024  Time: 11:26    Electronically signed by: Erik Hart MD  Date: 10/01/2024  Time: 12:10   MRI Hindfoot WO Contrast RT [[id number]] (Abnormal) Resulted: 10/01/24 1210   Order Status: Completed Updated: 10/01/24 1213   Narrative:     EXAMINATION:  MRI FOREFOOT WO CONTRAST RT; MRI HINDFOOT WO CONTRAST RT; MRI MIDFOOT WO CONTRAST RT    CLINICAL HISTORY:  Osteomyelitis, foot;    TECHNIQUE:  MRI of the entire right foot with multiplanar and multisequence imaging.  No intravenous contrast was administered.    COMPARISON:  Right foot radiograph 09/30/2024, right foot CT 06/28/2024, and right foot MRI 05/16/2023.    FINDINGS:  Exam limited by motion artifact.    BONES: Confluent marrow edema throughout the posterior 2/3 of the calcaneus with corresponding T1 medullary hypointensity and suspected posterior cortical irregularity.  Comfort marrow edema throughout the lateral half of the lateral malleolus with grossly preserved cortical margins and T1 medullary signal.  Confluent marrow edema throughout the 1st distal phalanx with preserved T1 medullary signal and cortical  margins.  Contour irregularity of the 5th metatarsal shaft, possibly sequela of a healed fracture or remote infection.  No acute fractures.  No avascular necrosis.    JOINTS: Scattered hindfoot, midfoot and forefoot osteoarthritis.  No subluxation or dislocation.  No joint effusions.    TENDONS: Full-thickness, partial width tearing of the insertional fibers of the Achilles tendon.  Posterior tibial, flexor digitorum longus, flexor hallucis longus, peroneus longus, peroneus brevis and extensor tendons appear grossly intact.    MUSCLES: Severe fatty degeneration and patchy edema of the visualized musculature, likely sequela of chronic denervation:    MISCELLANEOUS: Large soft tissue ulcer overlying the posterior and lateral aspects of the calcaneus.  Soft tissue ulceration overlying the lateral malleolus and 1st distal phalanx.  Generalized subcutaneous edema most pronounced along the dorsum of the foot.  No fluid collection.  Chronic thickening of the plantar aponeurosis.   Impression:       1. Acute osteomyelitis of the posterior 2/3 of the calcaneus with a large overlying soft tissue ulcer that involves the distal insertional fibers of the Achilles tendon.  2. Early osteomyelitis of the lateral malleolus and 1st distal phalanx with overlying soft tissue ulcers.  3. Additional findings, as above.  This report was flagged in Epic as abnormal.    Electronically signed by resident: Raegan Melvin  Date: 10/01/2024  Time: 11:26    Electronically signed by: Erik Hart MD  Date: 10/01/2024  Time: 12:10   MRI Forefoot WO Contrast RT [[id number]] (Abnormal) Resulted: 10/01/24 1210   Order Status: Completed Updated: 10/01/24 1213   Narrative:     EXAMINATION:  MRI FOREFOOT WO CONTRAST RT; MRI HINDFOOT WO CONTRAST RT; MRI MIDFOOT WO CONTRAST RT    CLINICAL HISTORY:  Osteomyelitis, foot;    TECHNIQUE:  MRI of the entire right foot with multiplanar and multisequence imaging.  No intravenous contrast was  administered.    COMPARISON:  Right foot radiograph 09/30/2024, right foot CT 06/28/2024, and right foot MRI 05/16/2023.    FINDINGS:  Exam limited by motion artifact.    BONES: Confluent marrow edema throughout the posterior 2/3 of the calcaneus with corresponding T1 medullary hypointensity and suspected posterior cortical irregularity.  Comfort marrow edema throughout the lateral half of the lateral malleolus with grossly preserved cortical margins and T1 medullary signal.  Confluent marrow edema throughout the 1st distal phalanx with preserved T1 medullary signal and cortical margins.  Contour irregularity of the 5th metatarsal shaft, possibly sequela of a healed fracture or remote infection.  No acute fractures.  No avascular necrosis.    JOINTS: Scattered hindfoot, midfoot and forefoot osteoarthritis.  No subluxation or dislocation.  No joint effusions.    TENDONS: Full-thickness, partial width tearing of the insertional fibers of the Achilles tendon.  Posterior tibial, flexor digitorum longus, flexor hallucis longus, peroneus longus, peroneus brevis and extensor tendons appear grossly intact.    MUSCLES: Severe fatty degeneration and patchy edema of the visualized musculature, likely sequela of chronic denervation:    MISCELLANEOUS: Large soft tissue ulcer overlying the posterior and lateral aspects of the calcaneus.  Soft tissue ulceration overlying the lateral malleolus and 1st distal phalanx.  Generalized subcutaneous edema most pronounced along the dorsum of the foot.  No fluid collection.  Chronic thickening of the plantar aponeurosis.   Impression:       1. Acute osteomyelitis of the posterior 2/3 of the calcaneus with a large overlying soft tissue ulcer that involves the distal insertional fibers of the Achilles tendon.  2. Early osteomyelitis of the lateral malleolus and 1st distal phalanx with overlying soft tissue ulcers.  3. Additional findings, as above.  This report was flagged in Epic as  abnormal.    Electronically signed by resident: Raegan Melvin  Date: 10/01/2024  Time: 11:26    Electronically signed by: Erik Hart MD  Date: 10/01/2024  Time: 12:10   US Lower Extremity Arteries Bilateral [[id number]] Resulted: 09/30/24 1712   Order Status: Completed Updated: 09/30/24 1715   Narrative:     EXAMINATION:  US LOWER EXTREMITY ARTERIES BILATERAL    CLINICAL HISTORY:  wound healing;    TECHNIQUE:  Bilateral spectral, color and grayscale images of the large arteries of both lower extremities were performed.    COMPARISON:  Multiple ultrasounds, most recent 07/26/2024    FINDINGS:  Right lower extremity: Peak systolic velocity in the right lower extremity arterial system measures 99 cm/sec in the anterior tibial artery.  No evidence of a hemodynamically significant stenosis.  There are triphasic waveforms throughout most of the right lower extremity.  There are monophasic waveforms in the posterior tibial artery.    Left lower extremity: History of above the knee amputation.  Peak systolic velocity in the left lower extremity arterial system measures 80 cm/sec in the deep femoral artery.  No evidence of a hemodynamically significant stenosis.  There are triphasic and biphasic waveforms throughout most of the left lower extremity.  There are monophasic waveforms in the distal femoral artery.    Miscellaneous: Arrhythmia.  Prominent right inguinal lymph node measuring 0.9 cm short axis, presumably reactive.   Impression:       Left above the knee amputation.    No hemodynamically significant arterial stenosis in either lower extremity.    Arrhythmia.  Correlate with EKG.      Electronically signed by: Rishi Ramos  Date: 09/30/2024  Time: 17:12   X-Ray Foot Complete Right [[id number]] (Abnormal) Resulted: 09/30/24 1426   Order Status: Completed Updated: 09/30/24 1429   Narrative:     EXAMINATION:  XR FOOT COMPLETE 3 VIEW RIGHT    CLINICAL HISTORY:  R heel wound;.    TECHNIQUE:  AP, lateral, and oblique  views of the right foot were performed.    COMPARISON:  06/28/2024    FINDINGS:  Three views right foot.    There is osteopenia.  There is remote injury of the 5th metatarsal.  There is motion blurring.  There is questionable periosteal reaction about the distal aspect of the 5th metatarsal versus motion blurring.  There is edema about the foot.  There are degenerative changes of the calcaneus as well as of the dorsal foot.  There is vascular calcification.  There is skin wound overlying the calcaneus.   Impression:       This report was flagged in Epic as abnormal.    1. Questionable indistinctness about the distal aspect of the 5th metatarsal, may reflect subacute injury to the region however infectious process not excluded.  Please note, evaluation is limited given motion blurring in the region.  Correlation is advised.  2. Diffuse edema about the foot noting skin wound at the level of the calcaneus.  Correlation is needed to exclude exposed bone.  3. There appears to be increased erosive change of the posterior aspect of the calcaneus since the previous examination.  Underlying or chronic osteomyelitis not excluded.      Electronically signed by: Milton Dove MD  Date: 09/30/2024  Time: 14:26   X-Ray Chest AP Portable [[id number]] Resulted: 09/29/24 1759   Order Status: Completed Updated: 09/29/24 1801   Narrative:     EXAMINATION:  XR CHEST AP PORTABLE    CLINICAL HISTORY:  wound infection;    TECHNIQUE:  Single frontal view of the chest was performed.    COMPARISON:  07/25/2024    FINDINGS:  The cardiomediastinal silhouette is not enlarged.  There is elevation of the right hemidiaphragm..  There is no pleural effusion.  The trachea is midline.  The lungs are symmetrically expanded bilaterally with coarse interstitial attenuation.  No large focal consolidation seen.  There is no pneumothorax.  The osseous structures are remarkable for degenerative change..   Impression:       Course interstitial attenuation,  similar to the prior exam.  Superimposed edema is a consideration.      Electronically signed by: Milton Dove MD  Date: 09/29/2024  Time: 17:59      Imaging History     2024    Date Procedure Name Study Review Link PACS Link Status Accession Number Location   10/03/24 10:22 AM Cardiac monitoring strips Study Review  Final     10/02/24 07:55 PM Cardiac monitoring strips Study Review  Final     10/02/24 03:09 PM Cardiac monitoring strips Study Review  Final     10/01/24 10:22 PM Cardiac monitoring strips Study Review  Final     10/01/24 11:23 AM MRI Hindfoot WO Contrast RT Study Review  Images Final [id number] [facility]   10/01/24 11:23 AM MRI Midfoot WO Contrast RT Study Review  Images Final [id number] [facility]   10/01/24 11:23 AM MRI Forefoot WO Contrast RT Study Review  Images Final [id number] [facility]   10/01/24 09:44 AM Cardiac monitoring strips Study Review  Final     09/30/24 11:22 PM Cardiac monitoring strips Study Review  Final     09/30/24 05:36 PM Cardiac monitoring strips Study Review  Final     09/30/24 04:54 PM US Lower Extremity Arteries Bilateral Study Review  Images Final [id number] [facility]   09/30/24 01:10 PM X-Ray Foot Complete Right Study Review  Images Final [id number] [facility]   09/29/24 10:35 PM Cardiac monitoring strips Study Review  Final     09/29/24 05:10 PM X-Ray Chest AP Portable Study Review  Images Final [id number] [facility]

## 2024-10-03 NOTE — PROGRESS NOTES
[facility] ([place])  Infectious Disease  Progress Note    Patient Name: Saji Castañeda  MRN: [id number]  Admission Date: 9/29/2024  Length of Stay: 3 days  Attending Physician: Jm Rosa MD  Primary Care Provider: Vinod Elise MD    Isolation Status: Contact  Assessment/Plan:      ID  * Osteomyelitis  73 yo male admitted with R heel wound with MRI showing calcaneal osteo and pelvic wounds.  L hip wound debrided and cultures show proteus m. Per gen sx, L hip wound does not probe to bone. POD following.      10/03/2024: Afebrile and WBC WNL.  Per general surgery note, left hip wound still with purulence.  Podiatry performed bone biopsy 10/03/2024 on right heel.  Patient reports slight subjective improvement.  Patient had been previously treated for right heel osteo ending 08/12/2024 by [facility] ID for MRSA and  Pseudomonas infection.  Possible that MRI findings are residual from prior infection and artifact.      Plan:  Stop daptomycin for now as no GPC growth on cultures.  Continue zosyn  Rec Pelvis imaging to assess for osteo/abscess  FU calcaneal biopsy cultures done by POD.  Trend CRP  FU cultures  Will follow        Anticipated Disposition: tbd    Thank you for your consult. I will follow-up with patient. Please contact us if you have any additional questions.    JOHNNY Goel  Infectious Disease  [facility]    Subjective:     Principal Problem:Osteomyelitis    HPI: Saji Castañeda is a 74 y.o. 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 Arellano,anemia, BPH s/p Chronic Sams, paroxysmal A-fib on eliquis, CHB s/p PPM medtronic (10/12/2023), CKD3, HTN, HLD and Osteomyelitis of R heel presenting from [facility] with suspected infection of L hip wound. He is nonambulatory . Patient has chronic, extensive sacral wounds, a R heel wound and a wound on the L hip with  purulence and a foul odor. The patient was recommended to come to the ED due to concerns of wound infection by nursing staff at NH. Patient reports intermittent pain at the wound sites that is controlled with Oxycodone.  Notably the patient was admitted to the ED on 7/25 for AMS while on Vanc and Cefepime for Osteomyelitis. Cefepime neurotoxicity was suspected and therapy was switched to Vanc and Zosyn then Dapto/Zosyn with and EOC of 8/12/24.      He has been seen by gen sx and L hip debrided and cultures sent showing proteus m.  UCX - NG and blood cultures NGTD.  Podiatry following for R foot and MRI R foot shows: 1. Acute osteomyelitis of the posterior 2/3 of the calcaneus with a large overlying soft tissue ulcer that involves the distal insertional fibers of the Achilles tendon. 2. Early osteomyelitis of the lateral malleolus and 1st distal phalanx with overlying soft tissue ulcers. He has been treated with Zosyn.  ID consulted for abx recs.    Interval History:   No acute events  Afebrile and white blood cell count normal  Blood cultures no growth to date  Wound culture Proteus mirabilis  Bone culture right heel in process  Sacral and pain slightly improved  General surgery notes purulence from left hip wound  Denies fevers chills sweats    Review of Systems   Constitutional:  Negative for chills, diaphoresis and fever.   Respiratory:  Negative for shortness of breath.    Cardiovascular:  Negative for chest pain.   Gastrointestinal:  Negative for abdominal pain, diarrhea, nausea and vomiting.   Genitourinary:  Negative for dysuria and hematuria.   Skin:  Positive for wound.     Objective:     Vital Signs (Most Recent):  Temp: 98.1 °F (36.7 °C) (10/03/24 1526)  Pulse: 66 (10/03/24 1602)  Resp: 16 (10/03/24 1526)  BP: (!) 169/74 (10/03/24 1526)  SpO2:  (NO 02 PROBE ON) (10/03/24 1602) Vital Signs (24h Range):  Temp:  [97.7 °F (36.5 °C)-98.9 °F (37.2 °C)] 98.1 °F (36.7 °C)  Pulse:  [55-84] 66  Resp:  [16-18]  16  SpO2:  [97 %-100 %] 99 %  BP: (120-169)/(58-74) 169/74     Weight: 83.4 kg (183 lb 13.8 oz)  Body mass index is 27.15 kg/m².    Estimated Creatinine Clearance: 70.9 mL/min (based on SCr of 0.9 mg/dL).     Physical Exam  Constitutional:       General: He is not in acute distress.     Appearance: He is well-developed. He is ill-appearing. He is not toxic-appearing or diaphoretic.       HENT:      Head: Normocephalic and atraumatic.   Cardiovascular:      Rate and Rhythm: Normal rate and regular rhythm.      Heart sounds: Normal heart sounds. No murmur heard.     No friction rub. No gallop.   Pulmonary:      Effort: Pulmonary effort is normal. No respiratory distress.      Breath sounds: Normal breath sounds. No wheezing or rales.   Abdominal:      General: Bowel sounds are normal. There is no distension.      Palpations: Abdomen is soft. There is no mass.      Tenderness: There is no abdominal tenderness. There is no guarding or rebound.   Skin:     General: Skin is warm and dry.   Neurological:      Mental Status: He is alert and oriented to person, place, and time.   Psychiatric:         Behavior: Behavior normal.                            Significant Labs: Blood Culture:   Recent Labs   Lab 06/28/24  2251 06/28/24  2252 07/25/24 2114 07/25/24 2115 09/29/24  1446   LABBLOO No growth after 5 days. No growth after 5 days. No growth after 5 days. No growth after 5 days. No Growth to date  No Growth to date  No Growth to date  No Growth to date  No Growth to date  No Growth to date  No Growth to date  No Growth to date     CBC:   Recent Labs   Lab 10/02/24  0514 10/03/24  0359   WBC 10.08 10.55   HGB 6.8* 6.8*   HCT 22.1* 22.3*   * 475*     CMP:   Recent Labs   Lab 10/02/24  0514 10/03/24  0359    136   K 3.7 3.7    105   CO2 26 23   * 176*   BUN 23 18   CREATININE 1.1 0.9   CALCIUM 8.2* 8.0*   PROT 6.3 6.0   ALBUMIN 1.4* 1.3*   BILITOT 0.3 0.2   ALKPHOS 179* 147*   AST 18 15    ALT 25 19   ANIONGAP 10 8     Urine Culture:   Recent Labs   Lab 05/08/24  2121 05/19/24  2102 07/25/24  1257 09/29/24  1722   LABURIN PROVIDENCIA STUARTII  >100,000 cfu/ml  * CANDIDA GLABRATA  10,000 - 49,999 cfu/ml  Treatment of asymptomatic candiduria is not recommended (except for   specific populations). Candida isolated in the urine typically   represents colonization. If an indwelling urinary catheter is present  it should be removed or replaced.  * No growth No growth     Urine Studies:   Recent Labs   Lab 08/05/24  1515 09/29/24  1722   COLORU Yellow Colorless*   APPEARANCEUA Hazy* Clear   PHUR 6.0 6.0   SPECGRAV 1.015 1.010   PROTEINUA 1+* Negative   GLUCUA Negative Negative   KETONESU Negative Negative   BILIRUBINUA Negative Negative   OCCULTUA 1+* 1+*   NITRITE Negative Negative   UROBILINOGEN Negative  --    LEUKOCYTESUR 3+* 3+*   RBCUA 25* 15*   WBCUA >100* >100*   BACTERIA Occasional Few*   SQUAMEPITHEL  --  1   HYALINECASTS 0  --      Wound Culture:   Recent Labs   Lab 07/01/24  1241 09/29/24  1628   LABAERO METHICILLIN RESISTANT STAPHYLOCOCCUS AUREUS  Few  *  PSEUDOMONAS AERUGINOSA   Few  * PROTEUS MIRABILIS  Few  Skin bossman also present  *     All pertinent labs within the past 24 hours have been reviewed.    Significant Imaging: I have reviewed all pertinent imaging results/findings within the past 24 hours.  MRI Midfoot WO Contrast RT [[id number]] (Abnormal) Resulted: 10/01/24 1210   Order Status: Completed Updated: 10/01/24 1213   Narrative:     EXAMINATION:  MRI FOREFOOT WO CONTRAST RT; MRI HINDFOOT WO CONTRAST RT; MRI MIDFOOT WO CONTRAST RT    CLINICAL HISTORY:  Osteomyelitis, foot;    TECHNIQUE:  MRI of the entire right foot with multiplanar and multisequence imaging.  No intravenous contrast was administered.    COMPARISON:  Right foot radiograph 09/30/2024, right foot CT 06/28/2024, and right foot MRI 05/16/2023.    FINDINGS:  Exam limited by motion artifact.    BONES: Confluent  marrow edema throughout the posterior 2/3 of the calcaneus with corresponding T1 medullary hypointensity and suspected posterior cortical irregularity.  Comfort marrow edema throughout the lateral half of the lateral malleolus with grossly preserved cortical margins and T1 medullary signal.  Confluent marrow edema throughout the 1st distal phalanx with preserved T1 medullary signal and cortical margins.  Contour irregularity of the 5th metatarsal shaft, possibly sequela of a healed fracture or remote infection.  No acute fractures.  No avascular necrosis.    JOINTS: Scattered hindfoot, midfoot and forefoot osteoarthritis.  No subluxation or dislocation.  No joint effusions.    TENDONS: Full-thickness, partial width tearing of the insertional fibers of the Achilles tendon.  Posterior tibial, flexor digitorum longus, flexor hallucis longus, peroneus longus, peroneus brevis and extensor tendons appear grossly intact.    MUSCLES: Severe fatty degeneration and patchy edema of the visualized musculature, likely sequela of chronic denervation:    MISCELLANEOUS: Large soft tissue ulcer overlying the posterior and lateral aspects of the calcaneus.  Soft tissue ulceration overlying the lateral malleolus and 1st distal phalanx.  Generalized subcutaneous edema most pronounced along the dorsum of the foot.  No fluid collection.  Chronic thickening of the plantar aponeurosis.   Impression:       1. Acute osteomyelitis of the posterior 2/3 of the calcaneus with a large overlying soft tissue ulcer that involves the distal insertional fibers of the Achilles tendon.  2. Early osteomyelitis of the lateral malleolus and 1st distal phalanx with overlying soft tissue ulcers.  3. Additional findings, as above.  This report was flagged in Epic as abnormal.    Electronically signed by resident: Raegan Melvin  Date: 10/01/2024  Time: 11:26    Electronically signed by: Erik Hart MD  Date: 10/01/2024  Time: 12:10   MRI Hindfoot WO Contrast  RT [[id number]] (Abnormal) Resulted: 10/01/24 1210   Order Status: Completed Updated: 10/01/24 1213   Narrative:     EXAMINATION:  MRI FOREFOOT WO CONTRAST RT; MRI HINDFOOT WO CONTRAST RT; MRI MIDFOOT WO CONTRAST RT    CLINICAL HISTORY:  Osteomyelitis, foot;    TECHNIQUE:  MRI of the entire right foot with multiplanar and multisequence imaging.  No intravenous contrast was administered.    COMPARISON:  Right foot radiograph 09/30/2024, right foot CT 06/28/2024, and right foot MRI 05/16/2023.    FINDINGS:  Exam limited by motion artifact.    BONES: Confluent marrow edema throughout the posterior 2/3 of the calcaneus with corresponding T1 medullary hypointensity and suspected posterior cortical irregularity.  Comfort marrow edema throughout the lateral half of the lateral malleolus with grossly preserved cortical margins and T1 medullary signal.  Confluent marrow edema throughout the 1st distal phalanx with preserved T1 medullary signal and cortical margins.  Contour irregularity of the 5th metatarsal shaft, possibly sequela of a healed fracture or remote infection.  No acute fractures.  No avascular necrosis.    JOINTS: Scattered hindfoot, midfoot and forefoot osteoarthritis.  No subluxation or dislocation.  No joint effusions.    TENDONS: Full-thickness, partial width tearing of the insertional fibers of the Achilles tendon.  Posterior tibial, flexor digitorum longus, flexor hallucis longus, peroneus longus, peroneus brevis and extensor tendons appear grossly intact.    MUSCLES: Severe fatty degeneration and patchy edema of the visualized musculature, likely sequela of chronic denervation:    MISCELLANEOUS: Large soft tissue ulcer overlying the posterior and lateral aspects of the calcaneus.  Soft tissue ulceration overlying the lateral malleolus and 1st distal phalanx.  Generalized subcutaneous edema most pronounced along the dorsum of the foot.  No fluid collection.  Chronic thickening of the plantar aponeurosis.    Impression:       1. Acute osteomyelitis of the posterior 2/3 of the calcaneus with a large overlying soft tissue ulcer that involves the distal insertional fibers of the Achilles tendon.  2. Early osteomyelitis of the lateral malleolus and 1st distal phalanx with overlying soft tissue ulcers.  3. Additional findings, as above.  This report was flagged in Epic as abnormal.    Electronically signed by resident: Raegan Melvin  Date: 10/01/2024  Time: 11:26    Electronically signed by: Erik Hart MD  Date: 10/01/2024  Time: 12:10   MRI Forefoot WO Contrast RT [[id number]] (Abnormal) Resulted: 10/01/24 1210   Order Status: Completed Updated: 10/01/24 1213   Narrative:     EXAMINATION:  MRI FOREFOOT WO CONTRAST RT; MRI HINDFOOT WO CONTRAST RT; MRI MIDFOOT WO CONTRAST RT    CLINICAL HISTORY:  Osteomyelitis, foot;    TECHNIQUE:  MRI of the entire right foot with multiplanar and multisequence imaging.  No intravenous contrast was administered.    COMPARISON:  Right foot radiograph 09/30/2024, right foot CT 06/28/2024, and right foot MRI 05/16/2023.    FINDINGS:  Exam limited by motion artifact.    BONES: Confluent marrow edema throughout the posterior 2/3 of the calcaneus with corresponding T1 medullary hypointensity and suspected posterior cortical irregularity.  Comfort marrow edema throughout the lateral half of the lateral malleolus with grossly preserved cortical margins and T1 medullary signal.  Confluent marrow edema throughout the 1st distal phalanx with preserved T1 medullary signal and cortical margins.  Contour irregularity of the 5th metatarsal shaft, possibly sequela of a healed fracture or remote infection.  No acute fractures.  No avascular necrosis.    JOINTS: Scattered hindfoot, midfoot and forefoot osteoarthritis.  No subluxation or dislocation.  No joint effusions.    TENDONS: Full-thickness, partial width tearing of the insertional fibers of the Achilles tendon.  Posterior tibial, flexor digitorum  longus, flexor hallucis longus, peroneus longus, peroneus brevis and extensor tendons appear grossly intact.    MUSCLES: Severe fatty degeneration and patchy edema of the visualized musculature, likely sequela of chronic denervation:    MISCELLANEOUS: Large soft tissue ulcer overlying the posterior and lateral aspects of the calcaneus.  Soft tissue ulceration overlying the lateral malleolus and 1st distal phalanx.  Generalized subcutaneous edema most pronounced along the dorsum of the foot.  No fluid collection.  Chronic thickening of the plantar aponeurosis.   Impression:       1. Acute osteomyelitis of the posterior 2/3 of the calcaneus with a large overlying soft tissue ulcer that involves the distal insertional fibers of the Achilles tendon.  2. Early osteomyelitis of the lateral malleolus and 1st distal phalanx with overlying soft tissue ulcers.  3. Additional findings, as above.  This report was flagged in Epic as abnormal.    Electronically signed by resident: Raegan Melvin  Date: 10/01/2024  Time: 11:26    Electronically signed by: Eirk Hart MD  Date: 10/01/2024  Time: 12:10   US Lower Extremity Arteries Bilateral [[id number]] Resulted: 09/30/24 1712   Order Status: Completed Updated: 09/30/24 1715   Narrative:     EXAMINATION:  US LOWER EXTREMITY ARTERIES BILATERAL    CLINICAL HISTORY:  wound healing;    TECHNIQUE:  Bilateral spectral, color and grayscale images of the large arteries of both lower extremities were performed.    COMPARISON:  Multiple ultrasounds, most recent 07/26/2024    FINDINGS:  Right lower extremity: Peak systolic velocity in the right lower extremity arterial system measures 99 cm/sec in the anterior tibial artery.  No evidence of a hemodynamically significant stenosis.  There are triphasic waveforms throughout most of the right lower extremity.  There are monophasic waveforms in the posterior tibial artery.    Left lower extremity: History of above the knee amputation.  Peak  systolic velocity in the left lower extremity arterial system measures 80 cm/sec in the deep femoral artery.  No evidence of a hemodynamically significant stenosis.  There are triphasic and biphasic waveforms throughout most of the left lower extremity.  There are monophasic waveforms in the distal femoral artery.    Miscellaneous: Arrhythmia.  Prominent right inguinal lymph node measuring 0.9 cm short axis, presumably reactive.   Impression:       Left above the knee amputation.    No hemodynamically significant arterial stenosis in either lower extremity.    Arrhythmia.  Correlate with EKG.      Electronically signed by: Rishi Ramos  Date: 09/30/2024  Time: 17:12   X-Ray Foot Complete Right [[id number]] (Abnormal) Resulted: 09/30/24 1426   Order Status: Completed Updated: 09/30/24 1429   Narrative:     EXAMINATION:  XR FOOT COMPLETE 3 VIEW RIGHT    CLINICAL HISTORY:  R heel wound;.    TECHNIQUE:  AP, lateral, and oblique views of the right foot were performed.    COMPARISON:  06/28/2024    FINDINGS:  Three views right foot.    There is osteopenia.  There is remote injury of the 5th metatarsal.  There is motion blurring.  There is questionable periosteal reaction about the distal aspect of the 5th metatarsal versus motion blurring.  There is edema about the foot.  There are degenerative changes of the calcaneus as well as of the dorsal foot.  There is vascular calcification.  There is skin wound overlying the calcaneus.   Impression:       This report was flagged in Epic as abnormal.    1. Questionable indistinctness about the distal aspect of the 5th metatarsal, may reflect subacute injury to the region however infectious process not excluded.  Please note, evaluation is limited given motion blurring in the region.  Correlation is advised.  2. Diffuse edema about the foot noting skin wound at the level of the calcaneus.  Correlation is needed to exclude exposed bone.  3. There appears to be increased erosive  change of the posterior aspect of the calcaneus since the previous examination.  Underlying or chronic osteomyelitis not excluded.      Electronically signed by: Milton Dove MD  Date: 09/30/2024  Time: 14:26   X-Ray Chest AP Portable [[id number]] Resulted: 09/29/24 1759   Order Status: Completed Updated: 09/29/24 1801   Narrative:     EXAMINATION:  XR CHEST AP PORTABLE    CLINICAL HISTORY:  wound infection;    TECHNIQUE:  Single frontal view of the chest was performed.    COMPARISON:  07/25/2024    FINDINGS:  The cardiomediastinal silhouette is not enlarged.  There is elevation of the right hemidiaphragm..  There is no pleural effusion.  The trachea is midline.  The lungs are symmetrically expanded bilaterally with coarse interstitial attenuation.  No large focal consolidation seen.  There is no pneumothorax.  The osseous structures are remarkable for degenerative change..   Impression:       Course interstitial attenuation, similar to the prior exam.  Superimposed edema is a consideration.      Electronically signed by: Milton Dove MD  Date: 09/29/2024  Time: 17:59      Imaging History     2024    Date Procedure Name Study Review Link PACS Link Status Accession Number Location   10/03/24 10:22 AM Cardiac monitoring strips Study Review  Final     10/02/24 07:55 PM Cardiac monitoring strips Study Review  Final     10/02/24 03:09 PM Cardiac monitoring strips Study Review  Final     10/01/24 10:22 PM Cardiac monitoring strips Study Review  Final     10/01/24 11:23 AM MRI Hindfoot WO Contrast RT Study Review  Images Final [id number] [facility]   10/01/24 11:23 AM MRI Midfoot WO Contrast RT Study Review  Images Final [id number] [facility]   10/01/24 11:23 AM MRI Forefoot WO Contrast RT Study Review  Images Final [id number] [facility]   10/01/24 09:44 AM Cardiac monitoring strips Study Review  Final     09/30/24 11:22 PM Cardiac monitoring strips Study Review  Final     09/30/24 05:36 PM Cardiac monitoring strips Study Review  Final      09/30/24 04:54 PM US Lower Extremity Arteries Bilateral Study Review  Images Final [id number] [facility]   09/30/24 01:10 PM X-Ray Foot Complete Right Study Review  Images Final [id number] [facility]   09/29/24 10:35 PM Cardiac monitoring strips Study Review  Final     09/29/24 05:10 PM X-Ray Chest AP Portable Study Review  Images Final [id number] [facility]

## 2024-10-03 NOTE — SUBJECTIVE & OBJECTIVE
Interval History: REMINGTON DOMINGUEZ. Patient reported 10/10 pain from sacral wound. He denies any new concerns. Podiatry did bone biopsy and debridement of R heel today.    Review of Systems   Constitutional:  Negative for appetite change.   Respiratory:  Negative for shortness of breath.    Cardiovascular:  Negative for chest pain.   Gastrointestinal:  Positive for abdominal pain. Negative for abdominal distention, diarrhea, nausea and vomiting.   Skin:  Positive for wound (painful sacral wound).     Objective:     Vital Signs (Most Recent):  Temp: 98.3 °F (36.8 °C) (10/03/24 1337)  Pulse: 66 (10/03/24 1337)  Resp: 17 (10/03/24 1337)  BP: 131/63 (10/03/24 1337)  SpO2: 99 % (10/03/24 1337) Vital Signs (24h Range):  Temp:  [97.7 °F (36.5 °C)-98.9 °F (37.2 °C)] 98.3 °F (36.8 °C)  Pulse:  [51-84] 66  Resp:  [16-18] 17  SpO2:  [97 %-100 %] 99 %  BP: (120-145)/(58-64) 131/63     Weight: 83.4 kg (183 lb 13.8 oz)  Body mass index is 27.15 kg/m².    Intake/Output Summary (Last 24 hours) at 10/3/2024 1436  Last data filed at 10/3/2024 0436  Gross per 24 hour   Intake --   Output 700 ml   Net -700 ml         Physical Exam  Constitutional:       General: He is not in acute distress.     Appearance: He is ill-appearing. He is not diaphoretic.   HENT:      Head: Normocephalic and atraumatic.      Right Ear: External ear normal.      Left Ear: External ear normal.      Mouth/Throat:      Mouth: Mucous membranes are dry.   Eyes:      General:         Right eye: No discharge.         Left eye: No discharge.      Extraocular Movements: Extraocular movements intact.   Cardiovascular:      Rate and Rhythm: Normal rate. Rhythm irregular.      Heart sounds: No murmur heard.  Pulmonary:      Effort: Pulmonary effort is normal. No respiratory distress.      Breath sounds: No wheezing.   Abdominal:      General: Abdomen is flat. There is no distension.      Tenderness: Tenderness: RLQ tenderness.   Musculoskeletal:      Cervical back: Normal  range of motion.      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 Surgical scar well-healed  Right lower extremity with cracked, dry skin as well as right heel wound  R heel dressing in tact      Skin:     General: Skin is dry.   Neurological:      General: No focal deficit present.      Mental Status: He is alert.             Significant Labs: All pertinent labs within the past 24 hours have been reviewed.    Significant Imaging: I have reviewed all pertinent imaging results/findings within the past 24 hours.

## 2024-10-03 NOTE — ASSESSMENT & PLAN NOTE
75 yo male admitted with R heel wound with MRI showing calcaneal osteo and pelvic wounds.  L hip wound debrided and cultures show proteus m. Per gen sx, L hip wound does not probe to bone. POD following.      10/03/2024: Afebrile and WBC WNL.  Per general surgery note, left hip wound still with purulence.  Podiatry performed bone biopsy 10/03/2024 on right heel.  Patient reports slight subjective improvement.  Patient had been previously treated for right heel osteo ending 08/12/2024 by [facility] ID for MRSA and  Pseudomonas infection.  Possible that MRI findings are residual from prior infection and artifact.      Plan:  Stop daptomycin for now as no GPC growth on cultures.  Continue zosyn  Rec Pelvis imaging to assess for osteo/abscess  FU calcaneal biopsy cultures done by POD.  Trend CRP  FU cultures  Will follow

## 2024-10-03 NOTE — HPI
Saji Castañeda is a 74 y.o. 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 Arellano,anemia, BPH s/p Chronic Sams, paroxysmal A-fib on eliquis, CHB s/p PPM medtronic (10/12/2023), CKD3, HTN, HLD and Osteomyelitis of R heel presenting from [facility] with suspected infection of L hip wound. He is nonambulatory . Patient has chronic, extensive sacral wounds, a R heel wound and a wound on the L hip with purulence and a foul odor. The patient was recommended to come to the ED due to concerns of wound infection by nursing staff at NH. Patient reports intermittent pain at the wound sites that is controlled with Oxycodone.  Notably the patient was admitted to the ED on 7/25 for AMS while on Vanc and Cefepime for Osteomyelitis. Cefepime neurotoxicity was suspected and therapy was switched to Vanc and Zosyn then Dapto/Zosyn with and EOC of 8/12/24.      He has been seen by gen sx and L hip debrided and cultures sent showing proteus m.  UCX - NG and blood cultures NGTD.  Podiatry following for R foot and MRI R foot shows: 1. Acute osteomyelitis of the posterior 2/3 of the calcaneus with a large overlying soft tissue ulcer that involves the distal insertional fibers of the Achilles tendon. 2. Early osteomyelitis of the lateral malleolus and 1st distal phalanx with overlying soft tissue ulcers. He has been treated with Zosyn.  ID consulted for abx recs.

## 2024-10-03 NOTE — NURSING
Pt AAOX4, pt had no acute events this shift. Pt VSS, plan of care continued, WC done by physicians, pt received 1 unit of PRBC's this shift and tolerated well. Pt had c/o pain in foot, relieved with PRN pain med. Pt repositioned as needed, call light and belongings in reach.

## 2024-10-03 NOTE — CARE UPDATE
GENERAL SURGERY CARE UPDATE     Patient seen and examined this evening. L hip wound, sacral wound, and lower back wounds all with healthy appearing tissue. No drainage or purulence. Wounds packed with wet-to-dry dressing.     - no further surgical intervention needed  - recommenced continued local wound care per Wound Care service   - OK for anticoagulation per primary  - rest of care per primary   - general surgery will sign off. please contact with questions, concerns.      Lissett Morales MD  General Surgery, PGY-3  Pager# [phone]

## 2024-10-03 NOTE — PROGRESS NOTES
[facility] ([place] [id number])  Podiatry  Progress Note    Patient Name: Saji Castañeda  MRN: [id number]  Admission Date: 9/29/2024  Hospital Length of Stay: 3 days  Attending Physician: Jm Rosa MD  Primary Care Provider: Vinod Elise MD     Subjective:     Interval History:  No adverse events overnight.  VSS, afebrile, WBC WNL.  Patient sitting up in bed watching TV.  Denies fevers, chills, nausea, or vomiting.  Denies any pain to his R foot.  Patient amenable to R foot bone biopsy.  Denies any new pedal complaints    Scheduled Meds:   ascorbic acid (vitamin C)  500 mg Oral BID    DAPTOmycin (CUBICIN) IV (PEDS and ADULTS)  8 mg/kg Intravenous Q24H    enoxparin  40 mg Subcutaneous Q24H (prophylaxis, 1700)    [START ON 10/4/2024] ergocalciferol  50,000 Units Oral Every Fri    furosemide  20 mg Oral Daily    gabapentin  300 mg Oral BID    insulin glargine U-100  6 Units Subcutaneous BID    multivitamin  1 tablet Oral Daily    piperacillin-tazobactam (Zosyn) IV (PEDS and ADULTS) (extended infusion is not appropriate)  4.5 g Intravenous Q8H    polyethylene glycol  17 g Oral Daily    senna  8.6 mg Oral Daily    tamsulosin  0.4 mg Oral Daily    zinc sulfate  220 mg Oral Daily     Continuous Infusions:  PRN Meds:  Current Facility-Administered Medications:     0.9%  NaCl infusion (for blood administration), , Intravenous, Q24H PRN    acetaminophen, 650 mg, Oral, Q6H PRN    dextrose 10%, 12.5 g, Intravenous, PRN    dextrose 10%, 25 g, Intravenous, PRN    glucagon (human recombinant), 1 mg, Intramuscular, PRN    glucose, 16 g, Oral, PRN    glucose, 24 g, Oral, PRN    HYDROmorphone, 0.2 mg, Intravenous, Q6H PRN    insulin aspart U-100, 0-5 Units, Subcutaneous, QID (AC + HS) PRN    naloxone, 0.02 mg, Intravenous, PRN    oxyCODONE, 10 mg, Oral, Q6H PRN    oxyCODONE, 5 mg, Oral, Q6H PRN    sodium chloride 0.9%, 10 mL, Intravenous, Q12H PRN    Review of Systems   Constitutional:  Negative for fatigue and fever.    HENT: Negative.     Eyes: Negative.    Respiratory: Negative.     Cardiovascular: Negative.    Gastrointestinal: Negative.    Endocrine: Negative.    Genitourinary: Negative.    Musculoskeletal:  Positive for gait problem.   Skin:  Positive for color change and wound.        Reports wound to her R heel   Allergic/Immunologic: Negative.    Hematological: Negative.    Psychiatric/Behavioral: Negative.       Objective:     Vital Signs (Most Recent):  Temp: 98.3 °F (36.8 °C) (10/03/24 1249)  Pulse: 64 (10/03/24 1249)  Resp: 18 (10/03/24 1249)  BP: 132/60 (10/03/24 1249)  SpO2: 100 % (10/03/24 1249) Vital Signs (24h Range):  Temp:  [97.7 °F (36.5 °C)-98.9 °F (37.2 °C)] 98.3 °F (36.8 °C)  Pulse:  [51-84] 64  Resp:  [16-18] 18  SpO2:  [97 %-100 %] 100 %  BP: (120-145)/(58-64) 132/60     Weight: 83.4 kg (183 lb 13.8 oz)  Body mass index is 27.15 kg/m².    Foot Exam     General  Affect: appropriate   Gait: (non ambulatory)     Right Foot/Ankle   Inspection and Palpation  Ecchymosis: none  Tenderness: none   Swelling: none   Skin Exam: dry skin, skin changes, ulcer (posterior heel) and erythema; skin not intact      Neurovascular  Dorsalis pedis: doppler  Posterior tibial: doppler  Saphenous nerve sensation: absent  Tibial nerve sensation: absent  Superficial peroneal nerve sensation: absent  Deep peroneal nerve sensation: absent  Sural nerve sensation: absent     Comments  Large Right posterior heel wound with osseous granular base, natalie wound dry eschar, malodor. Without drainage, purulence, bleeding. Probes to bone. Diffusely xerotic.     Left Foot/Ankle    Comments  S/p AKA    Laboratory:  BMP:   Recent Labs   Lab 10/03/24  0359   *      K 3.7      CO2 23   BUN 18   CREATININE 0.9   CALCIUM 8.0*   MG 1.9     CBC:   Recent Labs   Lab 10/03/24  0359   WBC 10.55   RBC 2.70*   HGB 6.8*   HCT 22.3*   *   MCV 83   MCH 25.2*   MCHC 30.5*     Diagnostic Results:  I have reviewed all pertinent imaging  "results/findings within the past 24 hours.    Clinical Findings:      Bone marrow    Date/Time: 10/3/2024 1:46 PM    Performed by: Tyra Aguero DPM  Authorized by: Toribio Wilson DPM    Consent Done?: Yes (Written)   Immediately prior to procedure a time out was called to verify the correct patient, procedure, equipment, support staff and site/side marked as required. .    Assistants?: No      Position: supine  Aspiration?: No    Biopsy?: Yes    Number of Specimens: 1  Estimated blood loss (cc):3  Patient tolerated the procedure well with no immediate complications.  Post-operative instructions were provided for the patient.  Patient was discharged and will follow up if any complications occur.     Bone biopsy conducted to R calcaneus using a Jamshidi trocar needle.  Patient tolerated procedure well.  Debridement    Date/Time: 10/3/2024 1:47 PM    Performed by: Tyra Aguero DPM  Authorized by: Toribio Wilson DPM    Time out: Immediately prior to procedure a "time out" was called to verify the correct patient, procedure, equipment, support staff and site/side marked as required.    Consent Done?:  Yes (Verbal)    Preparation: Patient was prepped and draped with clean technique      Wound Details:    Location:  Right foot    Location:  Right Heel    Type of Debridement:  Excisional       Length (cm):  5       Area (sq cm):  25       Width (cm):  5       Percent Debrided (%):  100       Depth (cm):  0.3       Total Area Debrided (sq cm):  25    Depth of debridement:  Bone    Tissue debrided:  Muscle, Bone, Subcutaneous and Hypergranulation    Devitalized tissue debrided:  Slough, Necrotic/Eschar, Fibrin, Biofilm and Callus    Instruments:  Blade, Curette, Forceps and Nippers  Bleeding:  Moderate  Hemostasis Achieved: Yes  Method Used:  Pressure     Assessment/Plan:     ID  * Osteomyelitis  Chronic ulceration of R posterior heel with mixture of granular and necrotic tissue WBC elevated. VSS, afebrile.  " XR R foot show chronic OM calcaneus, possible infectious distal 5th metatarsal. MRI shows OM to posterior calcaneus, lateral malleolus, and distal phalanx.  LE US arterial shows no stenosis.  IDSA mild/moderate foot wound infection.    Plan:  -Physical exam and imaging findings discussed with the patient.  Discussed with the patient that he has om 2 R calcaneus.  No stenosis to RLE.  -Bone biopsy and debridement to R heel.  For procedure note above  -Recommend continuing with extensive wound care and abx  -Abx per Primary/ID  -R heel dressed with Betadine-soaked gauze, ABD pad, Kerlix, and ACE  -Wound care orders updated  -Podiatry will continue to follow up    Future Discharge Recommendations  -Patient to follow up in outpatient Podiatry clinic. Podiatry will schedule  -Antibiotics per Primary/ID  -HH/SNF to do dressings 2-3 times a week as follows:  Rinsed R heel wound with Vashe and pat dry.  Dress wound with Hydrofera blue, Kerlix, and ACE wrap.  -Keep dressings clean, dry, and intact until follow-up appointment    Tyra Aguero DPM  Podiatry  [facility] ([place])

## 2024-10-03 NOTE — PROGRESS NOTES
[facility] ([place] [address])  [facility] Medicine  Progress Note    Patient Name: Saji Castañeda  MRN: [id number]  Patient Class: IP- Inpatient   Admission Date: 9/29/2024  Length of Stay: 3 days  Attending Physician: Jm Rosa MD  Primary Care Provider: Vinod Elise MD        Subjective:     Principal Problem:Osteomyelitis        HPI:  Saji Castañeda is a 74 y.o. 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 Arellano, Chronic Multifactorial Anemia, BPH s/p Chronic Sams, paroxysmal A-fib on eliquis, CHB s/p PPM medtronic (10/12/2023), CKD3, Multifactorial HTN, HLD and Osteomyelitis of R foot presenting from [facility] with suspected infection of L hip wound. He is nonambulatory . Patient has chronic, extensive sacral wounds, a R heel wound and a wound on the L hip with purulence and a foul odor. The patient was recommended to come to the ED due to concerns of wound infection by nursing staff at NH. Patient reports intermittent pain at the wound sites that is controlled with Oxycodone. Patient states that wound care manages these sites at the nursing home. Patient is alert, oriented and responsive to questioning however he is a poor historian regarding his medical history. Per Chart Review the patient was admitted to the ED on 7/25 for AMS while on Vanc and Cefepime for Osteomyelitis. Cefepime neurotoxicity was suspected and therapy was switched to Vanc and Zosyn. He then developed  He was transferred to [facility] at that time and had a pacemaker placed. Patient reports currently being on oral antibiotic therapy but is unsure what he is taking. He reports taking his medications daily that the nurse at NH provides. He reports never smoking or using drugs and no longer drinks alcohol.      Patient reports chronic Sams cath usage for unknown reason that was last changed today. He reports an episode of dysuria  yesterday and endorses suprapubic pain on palpation. He denies hematuria, urinary frequency/urgency, urinary odor or abdominal pain.      He also reports a mild cough and chills for the past several weeks with scant sputum production but denies fever, chest pain, SOB or recent illness. He reports chronic diaphoresis at night that requires him to change clothes in the morning.      Overview/Hospital Course:   74 y.o. 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 Arellano, Chronic Multifactorial Anemia, BPH s/p Chronic Sams, paroxysmal A-fib on eliquis, CHB s/p PPM medtronic (10/12/2023), CKD3, Multifactorial HTN, HLD and Osteomyelitis of R foot presenting from [facility] with suspected infection of sacral wound. Started on Vanc and Zosyn given prior history of prior susceptibilities. General surgery plans for wound debridement 10/1. Wound care and podiatry following. X-ray of right foot c/f chronic osteomyelitis. MRI with acute osteomyelitis of the posterior 2/3 of the calcaneus and early osteomyelitis of the lateral malleolus and 1st distal phalanx. R heel bone biopsy and debridement done by podiatry on 10/3.     Interval History: REMINGTON DOMINGUEZ. Patient reported 10/10 pain from sacral wound. He denies any new concerns. Podiatry did bone biopsy and debridement of R heel today.    Review of Systems   Constitutional:  Negative for appetite change.   Respiratory:  Negative for shortness of breath.    Cardiovascular:  Negative for chest pain.   Gastrointestinal:  Positive for abdominal pain. Negative for abdominal distention, diarrhea, nausea and vomiting.   Skin:  Positive for wound (painful sacral wound).     Objective:     Vital Signs (Most Recent):  Temp: 98.3 °F (36.8 °C) (10/03/24 1337)  Pulse: 66 (10/03/24 1337)  Resp: 17 (10/03/24 1337)  BP: 131/63 (10/03/24 1337)  SpO2: 99 % (10/03/24 1337) Vital Signs (24h  Range):  Temp:  [97.7 °F (36.5 °C)-98.9 °F (37.2 °C)] 98.3 °F (36.8 °C)  Pulse:  [51-84] 66  Resp:  [16-18] 17  SpO2:  [97 %-100 %] 99 %  BP: (120-145)/(58-64) 131/63     Weight: 83.4 kg (183 lb 13.8 oz)  Body mass index is 27.15 kg/m².    Intake/Output Summary (Last 24 hours) at 10/3/2024 1436  Last data filed at 10/3/2024 0436  Gross per 24 hour   Intake --   Output 700 ml   Net -700 ml         Physical Exam  Constitutional:       General: He is not in acute distress.     Appearance: He is ill-appearing. He is not diaphoretic.   HENT:      Head: Normocephalic and atraumatic.      Right Ear: External ear normal.      Left Ear: External ear normal.      Mouth/Throat:      Mouth: Mucous membranes are dry.   Eyes:      General:         Right eye: No discharge.         Left eye: No discharge.      Extraocular Movements: Extraocular movements intact.   Cardiovascular:      Rate and Rhythm: Normal rate. Rhythm irregular.      Heart sounds: No murmur heard.  Pulmonary:      Effort: Pulmonary effort is normal. No respiratory distress.      Breath sounds: No wheezing.   Abdominal:      General: Abdomen is flat. There is no distension.      Tenderness: Tenderness: RLQ tenderness.   Musculoskeletal:      Cervical back: Normal range of motion.      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 Surgical scar well-healed  Right lower extremity with cracked, dry skin as well as right heel wound  R heel dressing in tact      Skin:     General: Skin is dry.   Neurological:      General: No focal deficit present.      Mental Status: He is alert.             Significant Labs: All pertinent labs within the past 24 hours have been reviewed.    Significant Imaging: I have reviewed all pertinent imaging results/findings within the past 24 hours.    Assessment/Plan:      * Osteomyelitis  R heal OM diagnosed during admission 6/26-7/11 with bone cx positive for pseudomonas and MRSA. Discharged on vanc and cefepime for 6 week course.   Recent admission 8/2-8/6, at that time Cefepime discontinued due to c/f Cefepime neurotoxicity, then developed Vanc YNES, and discharged on Daptomycin and Zosyn  - extensive grade 3-4 sacral wounds, L hip wound purulence  - chronic osteomyelitis of R heel with nonhealing ulcer    - Wound Cx from L buttocks, positive for proteus susceptibilities pending  - Blood Cx NGTD  - started on IV Vanc in the ED, recommend renally dosing  - continue Vanc 1750 mg q12h   - continue Zosyn  - Wound care consulted, recommended podiatry consult, providing   - WBC elevated 13.6, likely due to wound infection  - trend CBC and CMP  - procalcitonin 0.12  - continue oxycodone and tylenol PRN for pain  - per pharmacy, obtain vanc trough on 10/01 at 6:00 before giving 4th dose, range 10-20 mcg/mL  - podiatry consulted, performed bone biopsy and debridement of the R heel on 10/3  - MRI ordered , c/f acute osteomyelitis of 2/3 calcaneus and early osteomyelitis of lateral malleolus & 1st distal phalanx  - ID consulted, IV Dapto started 10/2/24    Anemia  Anemia is likely due to chronic disease due to Chronic Kidney Disease. Most recent hemoglobin and hematocrit are listed below.  Recent Labs     10/01/24  0249 10/02/24  0514 10/03/24  0359   HGB 7.1* 6.8* 6.8*   HCT 23.7* 22.1* 22.3*       Plan  - Monitor serial CBC: daily  - Transfuse PRBC if patient becomes hemodynamically unstable, symptomatic or H/H drops below 7/21.  - Patient has received 1 units of PRBCs on 9/29  & 1 unit pRBCs on 10/3  - Patient's anemia is currently   7.1, below baseline 8-9  - Likely multifactorial with AOCD, as well as potentially GI bleed vs  AVM. Continue to monitor  - H&H 6.3 and 21.6   - hold iron supplement   - guaic + stool test  - Elevated ferritin, anemia 2/2 chronic disease  -      Chronic indwelling Fung catheter  Pt has failed multiple void trials in the past, now with chronic fung. Most recently replaced 9/29. C/f UTI  UA shows +1 blood, 3+ leuk  esterase, >100 WBCs and few bacteria, patient reports episode of dysuria yesterday and suprapubic pain on palpation   - Ucx, NGTD  - last Sams change today         Sacral wound  Left buttocks wound cx: proteus positive, susceptibles pending    Stage 3b chronic kidney disease  Creatine stable for now. BMP reviewed- noted Estimated Creatinine Clearance: 66.7 mL/min (based on SCr of 1.1 mg/dL). according to latest data. Based on current GFR, CKD stage is stage 3 - GFR 30-59.  Monitor UOP and serial BMP and adjust therapy as needed. Renally dose meds. Avoid nephrotoxic medications and procedures.    - hold nephrotoxic medications  - hold Lasix at this time   - renally dose Vanc    Paroxysmal atrial fibrillation  Patient with Paroxysmal (<7 days) atrial fibrillation which is uncontrolled. Patient is currently in atrial fibrillation.      echo on 7/2024 showed EF of 55-60% with no significant valvular abnormalities   - follows with Vascular Surgeon Dr. Banda  - hold home Lasix and HCTZ, not volume overloaded   - electrolytes stable, trend CMP  - hold home Eliquis due to unstable H&H, trend CBC  - monitor Afib, continuous telemetry     Wound of left leg  Left hip wound with purulent foul smelling drainage    - Wound cx ordered, f/u results  - On Vanc/Zosy  - Gen surg consulted for debridement , plans for 10/1          Type 2 diabetes mellitus, with long-term current use of insulin   Hb A1c on 7/25 was 6  - Lantus 12 units BID  - continue LDSSI   - POCT glucose checks before meals and nightly      Essential hypertension  Chronic, uncontrolled. Latest blood pressure and vitals reviewed-     Temp:  [98.4 °F (36.9 °C)-98.9 °F (37.2 °C)]   Pulse:  [61-85]   Resp:  [16-18]   BP: (104-122)/(53-68)   SpO2:  [98 %-100 %] .   Home meds for hypertension were reviewed and noted below.   Hypertension Medications               furosemide (LASIX) 20 MG tablet Take 1 tablet (20 mg total) by mouth once daily.    NIFEdipine  (PROCARDIA-XL) 90 MG (OSM) 24 hr tablet Take 1 tablet (90 mg total) by mouth once daily.            While in the hospital, will manage blood pressure as follows; Adjust home antihypertensive regimen as follows- home home Nifedipine    Will utilize p.r.n. blood pressure medication only if patient's blood pressure greater than 180/110 and he develops symptoms such as worsening chest pain or shortness of breath.     - hold diuretics, no signs of volume overload           VTE Risk Mitigation (From admission, onward)           Ordered     enoxaparin injection 40 mg  Every 24 hours         09/30/24 1935     Reason for No Pharmacological VTE Prophylaxis  Once        Question:  Reasons:  Answer:  Risk of Bleeding  Comment:  c/f bleeding with drop in Hgb    09/29/24 1914     IP VTE HIGH RISK PATIENT  Once         09/29/24 1914     Place sequential compression device  Until discontinued         09/29/24 1914                    Discharge Planning   OXANA: 10/7/2024     Code Status: Full Code   Is the patient medically ready for discharge?:     Reason for patient still in hospital (select all that apply): Treatment  Discharge Plan A: Long-term acute care facility (LTAC)   Discharge Delays: None known at this time              Anita Thomas MD  Department of Hospital Medicine   [facility] ([place]

## 2024-10-03 NOTE — ASSESSMENT & PLAN NOTE
Anemia is likely due to chronic disease due to Chronic Kidney Disease. Most recent hemoglobin and hematocrit are listed below.  Recent Labs     10/01/24  0249 10/02/24  0514 10/03/24  0359   HGB 7.1* 6.8* 6.8*   HCT 23.7* 22.1* 22.3*       Plan  - Monitor serial CBC: daily  - Transfuse PRBC if patient becomes hemodynamically unstable, symptomatic or H/H drops below 7/21.  - Patient has received 1 units of PRBCs on 9/29  & 1 unit pRBCs on 10/3  - Patient's anemia is currently   7.1, below baseline 8-9  - Likely multifactorial with AOCD, as well as potentially GI bleed vs  AVM. Continue to monitor  - H&H 6.3 and 21.6   - hold iron supplement   - guaic + stool test  - Elevated ferritin, anemia 2/2 chronic disease  -

## 2024-10-03 NOTE — CONSULTS
[facility] ([place])  Infectious Disease  Consult Note    Patient Name: Saji Castañeda  MRN: [id number]  Admission Date: 9/29/2024  Hospital Length of Stay: 2 days  Attending Physician: Jm Rosa MD  Primary Care Provider: Vinod Elise MD     Isolation Status: Contact    Patient information was obtained from patient, past medical records, and ER records.      Inpatient consult to Infectious Diseases  Consult performed by: Patel Stein Jr., PA  Consult ordered by: Nayeli Urena DO        Assessment/Plan:     ID  * Osteomyelitis  75 yo male admitted with R heel wound with MRI showing calcaneal osteo and pelvic wounds.  L hip wound debrided and cultures show proteus m. Per gen sx, L hip wound does not probe to bone. POD following.  Afebrile and WBC WNL.  Blood cultures NGTD.       Plan:  Start daptomycin and continue zosyn  Rec Pelvis imaging to assess for osteo/abscess  Rec calcaneal biopsy  Trend CRP  FU cultures  Will follow        Thank you for your consult. I will follow-up with patient. Please contact us if you have any additional questions.    JOHNNY Goel  Infectious Disease  [facility]    Subjective:     Principal Problem: Osteomyelitis    HPI: Saji Castañeda is a 74 y.o. 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 Arellano,anemia, BPH s/p Chronic Sams, paroxysmal A-fib on eliquis, CHB s/p PPM medtronic (10/12/2023), CKD3, HTN, HLD and Osteomyelitis of R heel presenting from [facility] with suspected infection of L hip wound. He is nonambulatory . Patient has chronic, extensive sacral wounds, a R heel wound and a wound on the L hip with purulence and a foul odor. The patient was recommended to come to the ED due to concerns of wound infection by nursing staff at NH. Patient reports intermittent pain at the wound sites that is controlled  with Oxycodone.  Notably the patient was admitted to the ED on 7/25 for AMS while on Vanc and Cefepime for Osteomyelitis. Cefepime neurotoxicity was suspected and therapy was switched to Vanc and Zosyn then Dapto/Zosyn with and EOC of 8/12/24.      He has been seen by gen sx and L hip debrided and cultures sent showing proteus m.  UCX - NG and blood cultures NGTD.  Podiatry following for R foot and MRI R foot shows: 1. Acute osteomyelitis of the posterior 2/3 of the calcaneus with a large overlying soft tissue ulcer that involves the distal insertional fibers of the Achilles tendon. 2. Early osteomyelitis of the lateral malleolus and 1st distal phalanx with overlying soft tissue ulcers. He has been treated with Zosyn.  ID consulted for abx recs.      Past Medical History:   Diagnosis Date    Arthritis     legs    Bacteremia due to Gram-negative bacteria 3/23/2021    Diabetes mellitus     Diabetes mellitus, type 2     Hyperlipidemia     Hypertension     Osteomyelitis     Palliative care encounter 5/24/2023       Past Surgical History:   Procedure Laterality Date    ABOVE-KNEE AMPUTATION Left 3/27/2024    Procedure: AMPUTATION, ABOVE KNEE;  Surgeon: Adal Arellano MD;  Location: [facility] OR [address] [place] [address];  Service: Vascular;  Laterality: Left;    ANGIOGRAPHY OF LOWER EXTREMITY N/A 2/3/2021    Procedure: Angiogram Extremity Bilateral;  Surgeon: Ernst Chacko MD;  Location: [facility] OR [address] [place] [address];  Service: Peripheral Vascular;  Laterality: N/A;  7.4 mintues fluoroscopy time  816.15 mGy  170.17 Gycm2    AORTOGRAPHY WITH EXTREMITY RUNOFF Bilateral 2/3/2021    Procedure: AORTOGRAM, WITH EXTREMITY RUNOFF;  Surgeon: Ernst Cahcko MD;  Location: [facility] OR [address] [place] [address];  Service: Peripheral Vascular;  Laterality: Bilateral;    DEBRIDEMENT OF FOOT Left 2/23/2021    Procedure: DEBRIDEMENT, LEFT HEEL;  Surgeon: Mayra Schroeder DPM;  Location: [facility] OR [address] [place] [address];  Service: Podiatry;  Laterality: Left;     "ESOPHAGOGASTRODUODENOSCOPY N/A 6/28/2024    Procedure: EGD (ESOPHAGOGASTRODUODENOSCOPY);  Surgeon: Adal Chandra MD;  Location: [facility];  Service: Gastroenterology;  Laterality: N/A;    FOOT AMPUTATION  October 2010    left high midfoot amputation    IMPLANTATION OF LEADLESS PACEMAKER N/A 10/12/2023    Procedure: [id number];  Surgeon: VANESSA Delatorre MD;  Location: [facility];  Service: Cardiology;  Laterality: N/A;  AVB, MICRA, EH, ANES, RM [id number]       Review of patient's allergies indicates:  No Known Allergies    Medications:  Medications Prior to Admission   Medication Sig    acetaminophen (TYLENOL) 325 MG tablet Take 650 mg by mouth every 6 (six) hours as needed for Pain.    apixaban (ELIQUIS) 5 mg Tab Take 1 tablet (5 mg total) by mouth 2 (two) times daily.    ascorbic acid, vitamin C, (VITAMIN C) 500 MG tablet Take 500 mg by mouth 2 (two) times daily.    BD AUTOSHIELD DUO PEN NEEDLE 30 gauge x 3/16" Ndle     BD ULTRA-FINE ADITYA PEN NEEDLE 32 gauge x 5/32" Ndle USE FOUR TIMES DAILY WITH MEALS AND NIGHTLY    blood sugar diagnostic (CONTOUR TEST STRIPS) Strp 1 strip by Misc.(Non-Drug; Combo Route) route 4 (four) times daily. Use to check blood glucose 4x daily    diphenhydrAMINE (BENADRYL) 25 mg capsule Take 1 capsule (25 mg total) by mouth every 6 (six) hours as needed for Itching.    ergocalciferol (ERGOCALCIFEROL) 50,000 unit Cap Take 1 capsule (50,000 Units total) by mouth Every Friday. for 10 doses    ferrous sulfate 325 (65 FE) MG EC tablet Take 325 mg by mouth 2 (two) times daily.    furosemide (LASIX) 20 MG tablet Take 1 tablet (20 mg total) by mouth once daily.    gabapentin (NEURONTIN) 300 MG capsule Take 300 mg by mouth 2 (two) times daily.    insulin aspart U-100 (NOVOLOG) 100 unit/mL (3 mL) InPn pen Inject 5 Units into the skin 3 (three) times daily with meals.    insulin aspart U-100 (NOVOLOG) 100 unit/mL injection Inject 2-10 Units into the skin 3 (three) times " daily before meals. 0-149=0  150-200: 2 units  201-250: 4 units  251-300: 6 units  301-350: 8 units  351-1000=10 units, NOTIFY MD    lancets (MICROLET LANCET) Misc 1 each by Misc.(Non-Drug; Combo Route) route 4 (four) times daily. Use to check blood sugars 4x daily    LANTUS SOLOSTAR U-100 INSULIN 100 unit/mL (3 mL) InPn pen Inject 12 Units into the skin 2 (two) times a day.    menthol-zinc oxide (CALMOSEPTINE) 0.44-20.6 % Oint Apply topically as needed.    multivitamin (THERAGRAN) per tablet Take 1 tablet by mouth once daily.    NIFEdipine (PROCARDIA-XL) 90 MG (OSM) 24 hr tablet Take 1 tablet (90 mg total) by mouth once daily.    oxyCODONE (ROXICODONE) 5 MG immediate release tablet Take 1 tablet (5 mg total) by mouth every 24 hours as needed for Pain.    pantoprazole (PROTONIX) 40 MG tablet Take 40 mg by mouth once daily. Before breakfast    potassium chloride SA (K-DUR,KLOR-CON) 20 MEQ tablet Take 20 mEq by mouth 2 (two) times daily.    povidone-iodine (BETADINE) 10 % external solution Apply topically as needed for Wound Care.    SANTYL ointment Apply topically once daily. unknown    tamsulosin (FLOMAX) 0.4 mg Cap Take 0.4 mg by mouth once daily.    TRUE METRIX GLUCOSE METER Misc     zinc sulfate (ZINCATE) 50 mg zinc (220 mg) capsule Take 220 mg by mouth every morning.     Antibiotics (From admission, onward)      Start     Stop Route Frequency Ordered    10/02/24 1300  DAPTOmycin (CUBICIN) 665 mg in 0.9% NaCl SolP 50 mL IVPB         -- IV Every 24 hours (non-standard times) 10/02/24 1149    09/29/24 2230  piperacillin-tazobactam (ZOSYN) 4.5 g in D5W 100 mL IVPB (MB+)         -- IV Every 8 hours (non-standard times) 09/29/24 1919          Antifungals (From admission, onward)      None          Antivirals (From admission, onward)      None             Immunization History   Administered Date(s) Administered    COVID-19, MRNA, LN-S, PF (MODERNA FULL 0.5 ML DOSE) 05/24/2021, 05/24/2021, 07/13/2021, 07/13/2021     Influenza (FLUAD) - Quadrivalent - Adjuvanted - PF *Preferred* (65+) 12/21/2023    Influenza - Quadrivalent - PF *Preferred* (6 months and older) 10/08/2020    Influenza - Trivalent - Afluria, Fluzone MDV 10/08/2020, 09/21/2021, 09/15/2022    PPD Test 12/09/2020, 03/02/2021, 06/21/2022, 09/19/2023, 03/18/2024    Pneumococcal Conjugate - 13 Valent 10/16/2018, 10/16/2018       Family History       Problem Relation (Age of Onset)    Cancer Brother    Diabetes Mother, Sister    Heart disease Mother    Stroke Sister          Social History     Socioeconomic History    Marital status: Single   Tobacco Use    Smoking status: Never    Smokeless tobacco: Never   Substance and Sexual Activity    Alcohol use: No     Comment: occassional    Drug use: No    Sexual activity: Not Currently   Social History Narrative    Not currently working; lives with family     Social Drivers of Health     Financial Resource Strain: Low Risk  (10/2/2024)    Overall Financial Resource Strain (CARDIA)     Difficulty of Paying Living Expenses: Not hard at all   Food Insecurity: No Food Insecurity (10/2/2024)    Hunger Vital Sign     Worried About Running Out of Food in the Last Year: Never true     Ran Out of Food in the Last Year: Never true   Transportation Needs: No Transportation Needs (10/2/2024)    TRANSPORTATION NEEDS     Transportation : No   Physical Activity: Insufficiently Active (9/30/2024)    Exercise Vital Sign     Days of Exercise per Week: 3 days     Minutes of Exercise per Session: 20 min   Stress: Stress Concern Present (10/2/2024)    [employer] [place] [employer] - Occupational Stress Questionnaire     Feeling of Stress : To some extent   Housing Stability: Low Risk  (10/2/2024)    Housing Stability Vital Sign     Unable to Pay for Housing in the Last Year: No     Homeless in the Last Year: No     Review of Systems   Constitutional:  Negative for appetite change, chills, diaphoresis, fatigue, fever and unexpected  weight change.   HENT:  Negative for congestion, ear pain, sore throat and tinnitus.    Eyes:  Negative for pain, redness and visual disturbance.   Respiratory:  Negative for cough, shortness of breath and wheezing.    Cardiovascular:  Negative for chest pain, palpitations and leg swelling.   Gastrointestinal:  Negative for abdominal pain, constipation, diarrhea, rectal pain and vomiting.   Endocrine: Negative for cold intolerance and heat intolerance.   Genitourinary:  Negative for dysuria, flank pain, frequency, hematuria and urgency.   Musculoskeletal:  Negative for arthralgias, back pain, myalgias and neck pain.   Skin:  Positive for wound. Negative for rash.   Allergic/Immunologic: Negative for immunocompromised state.   Neurological:  Negative for dizziness, light-headedness, numbness and headaches.   Hematological:  Negative for adenopathy. Does not bruise/bleed easily.   Psychiatric/Behavioral:  Negative for confusion and sleep disturbance. The patient is not nervous/anxious.      Objective:     Vital Signs (Most Recent):  Temp: 97.7 °F (36.5 °C) (10/02/24 2026)  Pulse: 84 (10/02/24 2026)  Resp: 16 (10/02/24 2026)  BP: (!) 145/64 (10/02/24 2026)  SpO2: 98 % (10/02/24 1552) Vital Signs (24h Range):  Temp:  [97.7 °F (36.5 °C)-98.9 °F (37.2 °C)] 97.7 °F (36.5 °C)  Pulse:  [51-84] 84  Resp:  [16-18] 16  SpO2:  [92 %-100 %] 98 %  BP: (130-145)/(60-64) 145/64     Weight: 83.4 kg (183 lb 13.8 oz)  Body mass index is 27.15 kg/m².    Estimated Creatinine Clearance: 58 mL/min (based on SCr of 1.1 mg/dL).     Physical Exam  Constitutional:       General: He is not in acute distress.     Appearance: He is well-developed. He is ill-appearing. He is not toxic-appearing or diaphoretic.       HENT:      Head: Normocephalic and atraumatic.   Cardiovascular:      Rate and Rhythm: Normal rate and regular rhythm.      Heart sounds: Normal heart sounds. No murmur heard.     No friction rub. No gallop.   Pulmonary:      Effort:  Pulmonary effort is normal. No respiratory distress.      Breath sounds: Normal breath sounds. No wheezing or rales.   Abdominal:      General: Bowel sounds are normal. There is no distension.      Palpations: Abdomen is soft. There is no mass.      Tenderness: There is no abdominal tenderness. There is no guarding or rebound.   Skin:     General: Skin is warm and dry.   Neurological:      Mental Status: He is alert and oriented to person, place, and time.   Psychiatric:         Behavior: Behavior normal.                                Significant Labs: Blood Culture:   Recent Labs   Lab 06/28/24  2251 06/28/24  2252 07/25/24 2114 07/25/24 2115 09/29/24  1446   LABBLOO No growth after 5 days. No growth after 5 days. No growth after 5 days. No growth after 5 days. No Growth to date  No Growth to date  No Growth to date  No Growth to date  No Growth to date  No Growth to date  No Growth to date  No Growth to date     CBC:   Recent Labs   Lab 10/01/24  0249 10/02/24  0514   WBC 13.44* 10.08   HGB 7.1* 6.8*   HCT 23.7* 22.1*   * 527*     CMP:   Recent Labs   Lab 10/01/24  0249 10/02/24  0514    139   K 4.2 3.7    103   CO2 25 26   * 115*   BUN 24* 23   CREATININE 1.0 1.1   CALCIUM 8.6* 8.2*   PROT 5.9* 6.3   ALBUMIN 1.2* 1.4*   BILITOT 0.2 0.3   ALKPHOS 234* 179*   AST 25 18   ALT 28 25   ANIONGAP 7* 10     Urine Culture:   Recent Labs   Lab 05/08/24  2121 05/19/24  2102 07/25/24  1257 09/29/24  1722   LABURIN PROVIDENCIA STUARTII  >100,000 cfu/ml  * CANDIDA GLABRATA  10,000 - 49,999 cfu/ml  Treatment of asymptomatic candiduria is not recommended (except for   specific populations). Candida isolated in the urine typically   represents colonization. If an indwelling urinary catheter is present  it should be removed or replaced.  * No growth No growth     Urine Studies:   Recent Labs   Lab 08/05/24  1515 09/29/24  1722   COLORU Yellow Colorless*   APPEARANCEUA Hazy* Clear   PHUR 6.0 6.0    SPECGRAV 1.015 1.010   PROTEINUA 1+* Negative   GLUCUA Negative Negative   KETONESU Negative Negative   BILIRUBINUA Negative Negative   OCCULTUA 1+* 1+*   NITRITE Negative Negative   UROBILINOGEN Negative  --    LEUKOCYTESUR 3+* 3+*   RBCUA 25* 15*   WBCUA >100* >100*   BACTERIA Occasional Few*   SQUAMEPITHEL  --  1   HYALINECASTS 0  --      Wound Culture:   Recent Labs   Lab 07/01/24  1241 09/29/24  1628   LABAERO METHICILLIN RESISTANT STAPHYLOCOCCUS AUREUS  Few  *  PSEUDOMONAS AERUGINOSA   Few  * PROTEUS MIRABILIS  Few  Skin bossman also present  *     All pertinent labs within the past 24 hours have been reviewed.    Significant Imaging: I have reviewed all pertinent imaging results/findings within the past 24 hours.  MRI Midfoot WO Contrast RT [[id number]] (Abnormal) Resulted: 10/01/24 1210   Order Status: Completed Updated: 10/01/24 1213   Narrative:     EXAMINATION:  MRI FOREFOOT WO CONTRAST RT; MRI HINDFOOT WO CONTRAST RT; MRI MIDFOOT WO CONTRAST RT    CLINICAL HISTORY:  Osteomyelitis, foot;    TECHNIQUE:  MRI of the entire right foot with multiplanar and multisequence imaging.  No intravenous contrast was administered.    COMPARISON:  Right foot radiograph 09/30/2024, right foot CT 06/28/2024, and right foot MRI 05/16/2023.    FINDINGS:  Exam limited by motion artifact.    BONES: Confluent marrow edema throughout the posterior 2/3 of the calcaneus with corresponding T1 medullary hypointensity and suspected posterior cortical irregularity.  Comfort marrow edema throughout the lateral half of the lateral malleolus with grossly preserved cortical margins and T1 medullary signal.  Confluent marrow edema throughout the 1st distal phalanx with preserved T1 medullary signal and cortical margins.  Contour irregularity of the 5th metatarsal shaft, possibly sequela of a healed fracture or remote infection.  No acute fractures.  No avascular necrosis.    JOINTS: Scattered hindfoot, midfoot and forefoot  osteoarthritis.  No subluxation or dislocation.  No joint effusions.    TENDONS: Full-thickness, partial width tearing of the insertional fibers of the Achilles tendon.  Posterior tibial, flexor digitorum longus, flexor hallucis longus, peroneus longus, peroneus brevis and extensor tendons appear grossly intact.    MUSCLES: Severe fatty degeneration and patchy edema of the visualized musculature, likely sequela of chronic denervation:    MISCELLANEOUS: Large soft tissue ulcer overlying the posterior and lateral aspects of the calcaneus.  Soft tissue ulceration overlying the lateral malleolus and 1st distal phalanx.  Generalized subcutaneous edema most pronounced along the dorsum of the foot.  No fluid collection.  Chronic thickening of the plantar aponeurosis.   Impression:       1. Acute osteomyelitis of the posterior 2/3 of the calcaneus with a large overlying soft tissue ulcer that involves the distal insertional fibers of the Achilles tendon.  2. Early osteomyelitis of the lateral malleolus and 1st distal phalanx with overlying soft tissue ulcers.  3. Additional findings, as above.  This report was flagged in Epic as abnormal.    Electronically signed by resident: Raegan Melvin  Date: 10/01/2024  Time: 11:26    Electronically signed by: Erik Hart MD  Date: 10/01/2024  Time: 12:10   MRI Hindfoot WO Contrast RT [[id number]] (Abnormal) Resulted: 10/01/24 1210   Order Status: Completed Updated: 10/01/24 1213   Narrative:     EXAMINATION:  MRI FOREFOOT WO CONTRAST RT; MRI HINDFOOT WO CONTRAST RT; MRI MIDFOOT WO CONTRAST RT    CLINICAL HISTORY:  Osteomyelitis, foot;    TECHNIQUE:  MRI of the entire right foot with multiplanar and multisequence imaging.  No intravenous contrast was administered.    COMPARISON:  Right foot radiograph 09/30/2024, right foot CT 06/28/2024, and right foot MRI 05/16/2023.    FINDINGS:  Exam limited by motion artifact.    BONES: Confluent marrow edema throughout the posterior 2/3 of the  calcaneus with corresponding T1 medullary hypointensity and suspected posterior cortical irregularity.  Comfort marrow edema throughout the lateral half of the lateral malleolus with grossly preserved cortical margins and T1 medullary signal.  Confluent marrow edema throughout the 1st distal phalanx with preserved T1 medullary signal and cortical margins.  Contour irregularity of the 5th metatarsal shaft, possibly sequela of a healed fracture or remote infection.  No acute fractures.  No avascular necrosis.    JOINTS: Scattered hindfoot, midfoot and forefoot osteoarthritis.  No subluxation or dislocation.  No joint effusions.    TENDONS: Full-thickness, partial width tearing of the insertional fibers of the Achilles tendon.  Posterior tibial, flexor digitorum longus, flexor hallucis longus, peroneus longus, peroneus brevis and extensor tendons appear grossly intact.    MUSCLES: Severe fatty degeneration and patchy edema of the visualized musculature, likely sequela of chronic denervation:    MISCELLANEOUS: Large soft tissue ulcer overlying the posterior and lateral aspects of the calcaneus.  Soft tissue ulceration overlying the lateral malleolus and 1st distal phalanx.  Generalized subcutaneous edema most pronounced along the dorsum of the foot.  No fluid collection.  Chronic thickening of the plantar aponeurosis.   Impression:       1. Acute osteomyelitis of the posterior 2/3 of the calcaneus with a large overlying soft tissue ulcer that involves the distal insertional fibers of the Achilles tendon.  2. Early osteomyelitis of the lateral malleolus and 1st distal phalanx with overlying soft tissue ulcers.  3. Additional findings, as above.  This report was flagged in Epic as abnormal.    Electronically signed by resident: Raeagn Melvin  Date: 10/01/2024  Time: 11:26    Electronically signed by: Erik Hart MD  Date: 10/01/2024  Time: 12:10   MRI Forefoot WO Contrast RT [[id number]] (Abnormal) Resulted: 10/01/24  1210   Order Status: Completed Updated: 10/01/24 1213   Narrative:     EXAMINATION:  MRI FOREFOOT WO CONTRAST RT; MRI HINDFOOT WO CONTRAST RT; MRI MIDFOOT WO CONTRAST RT    CLINICAL HISTORY:  Osteomyelitis, foot;    TECHNIQUE:  MRI of the entire right foot with multiplanar and multisequence imaging.  No intravenous contrast was administered.    COMPARISON:  Right foot radiograph 09/30/2024, right foot CT 06/28/2024, and right foot MRI 05/16/2023.    FINDINGS:  Exam limited by motion artifact.    BONES: Confluent marrow edema throughout the posterior 2/3 of the calcaneus with corresponding T1 medullary hypointensity and suspected posterior cortical irregularity.  Comfort marrow edema throughout the lateral half of the lateral malleolus with grossly preserved cortical margins and T1 medullary signal.  Confluent marrow edema throughout the 1st distal phalanx with preserved T1 medullary signal and cortical margins.  Contour irregularity of the 5th metatarsal shaft, possibly sequela of a healed fracture or remote infection.  No acute fractures.  No avascular necrosis.    JOINTS: Scattered hindfoot, midfoot and forefoot osteoarthritis.  No subluxation or dislocation.  No joint effusions.    TENDONS: Full-thickness, partial width tearing of the insertional fibers of the Achilles tendon.  Posterior tibial, flexor digitorum longus, flexor hallucis longus, peroneus longus, peroneus brevis and extensor tendons appear grossly intact.    MUSCLES: Severe fatty degeneration and patchy edema of the visualized musculature, likely sequela of chronic denervation:    MISCELLANEOUS: Large soft tissue ulcer overlying the posterior and lateral aspects of the calcaneus.  Soft tissue ulceration overlying the lateral malleolus and 1st distal phalanx.  Generalized subcutaneous edema most pronounced along the dorsum of the foot.  No fluid collection.  Chronic thickening of the plantar aponeurosis.   Impression:       1. Acute osteomyelitis of  the posterior 2/3 of the calcaneus with a large overlying soft tissue ulcer that involves the distal insertional fibers of the Achilles tendon.  2. Early osteomyelitis of the lateral malleolus and 1st distal phalanx with overlying soft tissue ulcers.  3. Additional findings, as above.  This report was flagged in Epic as abnormal.    Electronically signed by resident: Raegan Melvin  Date: 10/01/2024  Time: 11:26    Electronically signed by: Erik Hart MD  Date: 10/01/2024  Time: 12:10   US Lower Extremity Arteries Bilateral [[id number]] Resulted: 09/30/24 1712   Order Status: Completed Updated: 09/30/24 1715   Narrative:     EXAMINATION:  US LOWER EXTREMITY ARTERIES BILATERAL    CLINICAL HISTORY:  wound healing;    TECHNIQUE:  Bilateral spectral, color and grayscale images of the large arteries of both lower extremities were performed.    COMPARISON:  Multiple ultrasounds, most recent 07/26/2024    FINDINGS:  Right lower extremity: Peak systolic velocity in the right lower extremity arterial system measures 99 cm/sec in the anterior tibial artery.  No evidence of a hemodynamically significant stenosis.  There are triphasic waveforms throughout most of the right lower extremity.  There are monophasic waveforms in the posterior tibial artery.    Left lower extremity: History of above the knee amputation.  Peak systolic velocity in the left lower extremity arterial system measures 80 cm/sec in the deep femoral artery.  No evidence of a hemodynamically significant stenosis.  There are triphasic and biphasic waveforms throughout most of the left lower extremity.  There are monophasic waveforms in the distal femoral artery.    Miscellaneous: Arrhythmia.  Prominent right inguinal lymph node measuring 0.9 cm short axis, presumably reactive.   Impression:       Left above the knee amputation.    No hemodynamically significant arterial stenosis in either lower extremity.    Arrhythmia.  Correlate with  EKG.      Electronically signed by: Rishi Ramos  Date: 09/30/2024  Time: 17:12   X-Ray Foot Complete Right [[id number]] (Abnormal) Resulted: 09/30/24 1426   Order Status: Completed Updated: 09/30/24 1429   Narrative:     EXAMINATION:  XR FOOT COMPLETE 3 VIEW RIGHT    CLINICAL HISTORY:  R heel wound;.    TECHNIQUE:  AP, lateral, and oblique views of the right foot were performed.    COMPARISON:  06/28/2024    FINDINGS:  Three views right foot.    There is osteopenia.  There is remote injury of the 5th metatarsal.  There is motion blurring.  There is questionable periosteal reaction about the distal aspect of the 5th metatarsal versus motion blurring.  There is edema about the foot.  There are degenerative changes of the calcaneus as well as of the dorsal foot.  There is vascular calcification.  There is skin wound overlying the calcaneus.   Impression:       This report was flagged in Epic as abnormal.    1. Questionable indistinctness about the distal aspect of the 5th metatarsal, may reflect subacute injury to the region however infectious process not excluded.  Please note, evaluation is limited given motion blurring in the region.  Correlation is advised.  2. Diffuse edema about the foot noting skin wound at the level of the calcaneus.  Correlation is needed to exclude exposed bone.  3. There appears to be increased erosive change of the posterior aspect of the calcaneus since the previous examination.  Underlying or chronic osteomyelitis not excluded.      Electronically signed by: Milton Dove MD  Date: 09/30/2024  Time: 14:26   X-Ray Chest AP Portable [[id number]] Resulted: 09/29/24 1759   Order Status: Completed Updated: 09/29/24 1801   Narrative:     EXAMINATION:  XR CHEST AP PORTABLE    CLINICAL HISTORY:  wound infection;    TECHNIQUE:  Single frontal view of the chest was performed.    COMPARISON:  07/25/2024    FINDINGS:  The cardiomediastinal silhouette is not enlarged.  There is elevation of the  right hemidiaphragm..  There is no pleural effusion.  The trachea is midline.  The lungs are symmetrically expanded bilaterally with coarse interstitial attenuation.  No large focal consolidation seen.  There is no pneumothorax.  The osseous structures are remarkable for degenerative change..   Impression:       Course interstitial attenuation, similar to the prior exam.  Superimposed edema is a consideration.      Electronically signed by: Milton Dove MD  Date: 09/29/2024  Time: 17:59      Imaging History     2024    Date Procedure Name Study Review Link PACS Link Status Accession Number Location   10/02/24 07:55 PM Cardiac monitoring strips Study Review  Final     10/02/24 03:09 PM Cardiac monitoring strips Study Review  Final     10/01/24 10:22 PM Cardiac monitoring strips Study Review  Final     10/01/24 11:23 AM MRI Hindfoot WO Contrast RT Study Review  Images Final [id number] [facility]   10/01/24 11:23 AM MRI Midfoot WO Contrast RT Study Review  Images Final [id number] [facility]   10/01/24 11:23 AM MRI Forefoot WO Contrast RT Study Review  Images Final [id number] [facility]   10/01/24 09:44 AM Cardiac monitoring strips Study Review  Final     09/30/24 11:22 PM Cardiac monitoring strips Study Review  Final     09/30/24 05:36 PM Cardiac monitoring strips Study Review  Final     09/30/24 04:54 PM US Lower Extremity Arteries Bilateral Study Review  Images Final [id number] [facility]   09/30/24 01:10 PM X-Ray Foot Complete Right Study Review  Images Final [id number] [facility]   09/29/24 10:35 PM Cardiac monitoring strips Study Review  Final     09/29/24 05:10 PM X-Ray Chest AP Portable Study Review  Images Final [id number] [facility]

## 2024-10-03 NOTE — SUBJECTIVE & OBJECTIVE
Subjective:     Interval History:  No adverse events overnight.  VSS, afebrile, WBC WNL.  Patient sitting up in bed watching TV.  Denies fevers, chills, nausea, or vomiting.  Denies any pain to his R foot.  Patient amenable to R foot bone biopsy.  Denies any new pedal complaints    Scheduled Meds:   ascorbic acid (vitamin C)  500 mg Oral BID    DAPTOmycin (CUBICIN) IV (PEDS and ADULTS)  8 mg/kg Intravenous Q24H    enoxparin  40 mg Subcutaneous Q24H (prophylaxis, 1700)    [START ON 10/4/2024] ergocalciferol  50,000 Units Oral Every Fri    furosemide  20 mg Oral Daily    gabapentin  300 mg Oral BID    insulin glargine U-100  6 Units Subcutaneous BID    multivitamin  1 tablet Oral Daily    piperacillin-tazobactam (Zosyn) IV (PEDS and ADULTS) (extended infusion is not appropriate)  4.5 g Intravenous Q8H    polyethylene glycol  17 g Oral Daily    senna  8.6 mg Oral Daily    tamsulosin  0.4 mg Oral Daily    zinc sulfate  220 mg Oral Daily     Continuous Infusions:  PRN Meds:  Current Facility-Administered Medications:     0.9%  NaCl infusion (for blood administration), , Intravenous, Q24H PRN    acetaminophen, 650 mg, Oral, Q6H PRN    dextrose 10%, 12.5 g, Intravenous, PRN    dextrose 10%, 25 g, Intravenous, PRN    glucagon (human recombinant), 1 mg, Intramuscular, PRN    glucose, 16 g, Oral, PRN    glucose, 24 g, Oral, PRN    HYDROmorphone, 0.2 mg, Intravenous, Q6H PRN    insulin aspart U-100, 0-5 Units, Subcutaneous, QID (AC + HS) PRN    naloxone, 0.02 mg, Intravenous, PRN    oxyCODONE, 10 mg, Oral, Q6H PRN    oxyCODONE, 5 mg, Oral, Q6H PRN    sodium chloride 0.9%, 10 mL, Intravenous, Q12H PRN    Review of Systems   Constitutional:  Negative for fatigue and fever.   HENT: Negative.     Eyes: Negative.    Respiratory: Negative.     Cardiovascular: Negative.    Gastrointestinal: Negative.    Endocrine: Negative.    Genitourinary: Negative.    Musculoskeletal:  Positive for gait problem.   Skin:  Positive for color  change and wound.        Reports wound to her R heel   Allergic/Immunologic: Negative.    Hematological: Negative.    Psychiatric/Behavioral: Negative.       Objective:     Vital Signs (Most Recent):  Temp: 98.3 °F (36.8 °C) (10/03/24 1249)  Pulse: 64 (10/03/24 1249)  Resp: 18 (10/03/24 1249)  BP: 132/60 (10/03/24 1249)  SpO2: 100 % (10/03/24 1249) Vital Signs (24h Range):  Temp:  [97.7 °F (36.5 °C)-98.9 °F (37.2 °C)] 98.3 °F (36.8 °C)  Pulse:  [51-84] 64  Resp:  [16-18] 18  SpO2:  [97 %-100 %] 100 %  BP: (120-145)/(58-64) 132/60     Weight: 83.4 kg (183 lb 13.8 oz)  Body mass index is 27.15 kg/m².    Foot Exam     General  Affect: appropriate   Gait: (non ambulatory)     Right Foot/Ankle   Inspection and Palpation  Ecchymosis: none  Tenderness: none   Swelling: none   Skin Exam: dry skin, skin changes, ulcer (posterior heel) and erythema; skin not intact      Neurovascular  Dorsalis pedis: doppler  Posterior tibial: doppler  Saphenous nerve sensation: absent  Tibial nerve sensation: absent  Superficial peroneal nerve sensation: absent  Deep peroneal nerve sensation: absent  Sural nerve sensation: absent     Comments  Large Right posterior heel wound with osseous granular base, natalie wound dry eschar, malodor. Without drainage, purulence, bleeding. Probes to bone. Diffusely xerotic.     Left Foot/Ankle    Comments  S/p AKA    Laboratory:  BMP:   Recent Labs   Lab 10/03/24  0359   *      K 3.7      CO2 23   BUN 18   CREATININE 0.9   CALCIUM 8.0*   MG 1.9     CBC:   Recent Labs   Lab 10/03/24  0359   WBC 10.55   RBC 2.70*   HGB 6.8*   HCT 22.3*   *   MCV 83   MCH 25.2*   MCHC 30.5*     Diagnostic Results:  I have reviewed all pertinent imaging results/findings within the past 24 hours.    Clinical Findings:

## 2024-10-03 NOTE — SUBJECTIVE & OBJECTIVE
Past Medical History:   Diagnosis Date    Arthritis     legs    Bacteremia due to Gram-negative bacteria 3/23/2021    Diabetes mellitus     Diabetes mellitus, type 2     Hyperlipidemia     Hypertension     Osteomyelitis     Palliative care encounter 5/24/2023       Past Surgical History:   Procedure Laterality Date    ABOVE-KNEE AMPUTATION Left 3/27/2024    Procedure: AMPUTATION, ABOVE KNEE;  Surgeon: Adal Arellano MD;  Location: [address];  Service: Vascular;  Laterality: Left;    ANGIOGRAPHY OF LOWER EXTREMITY N/A 2/3/2021    Procedure: Angiogram Extremity Bilateral;  Surgeon: Ernst Chacko MD;  Location: [address];  Service: Peripheral Vascular;  Laterality: N/A;  7.4 mintues fluoroscopy time  816.15 mGy  170.17 Gycm2    AORTOGRAPHY WITH EXTREMITY RUNOFF Bilateral 2/3/2021    Procedure: AORTOGRAM, WITH EXTREMITY RUNOFF;  Surgeon: Ernst Chacko MD;  Location: [address];  Service: Peripheral Vascular;  Laterality: Bilateral;    DEBRIDEMENT OF FOOT Left 2/23/2021    Procedure: DEBRIDEMENT, LEFT HEEL;  Surgeon: Mayra Schroeder DPM;  Location: [address];  Service: Podiatry;  Laterality: Left;    ESOPHAGOGASTRODUODENOSCOPY N/A 6/28/2024    Procedure: EGD (ESOPHAGOGASTRODUODENOSCOPY);  Surgeon: Adal Chandra MD;  Location: [facility] ENDO;  Service: Gastroenterology;  Laterality: N/A;    FOOT AMPUTATION  October 2010    left high midfoot amputation    IMPLANTATION OF LEADLESS PACEMAKER N/A 10/12/2023    Procedure: [id number]-[id number];  Surgeon: VANESSA Delatorre MD;  Location: [facility] EP LAB;  Service: Cardiology;  Laterality: N/A;  AVB, MI[place], EH, [place], [place] [id number]       Review of patient's allergies indicates:  No Known Allergies    Medications:  Medications Prior to Admission   Medication Sig    acetaminophen (TYLENOL) 325 MG tablet Take 650 mg by mouth every 6 (six) hours as needed for Pain.    apixaban (ELIQUIS) 5 mg Tab Take 1 tablet (5 mg  "total) by mouth 2 (two) times daily.    ascorbic acid, vitamin C, (VITAMIN C) 500 MG tablet Take 500 mg by mouth 2 (two) times daily.    BD AUTOSHIELD DUO PEN NEEDLE 30 gauge x 3/16" Ndle     BD ULTRA-FINE ADITYA PEN NEEDLE 32 gauge x 5/32" Ndle USE FOUR TIMES DAILY WITH MEALS AND NIGHTLY    blood sugar diagnostic (CONTOUR TEST STRIPS) Strp 1 strip by Misc.(Non-Drug; Combo Route) route 4 (four) times daily. Use to check blood glucose 4x daily    diphenhydrAMINE (BENADRYL) 25 mg capsule Take 1 capsule (25 mg total) by mouth every 6 (six) hours as needed for Itching.    ergocalciferol (ERGOCALCIFEROL) 50,000 unit Cap Take 1 capsule (50,000 Units total) by mouth Every Friday. for 10 doses    ferrous sulfate 325 (65 FE) MG EC tablet Take 325 mg by mouth 2 (two) times daily.    furosemide (LASIX) 20 MG tablet Take 1 tablet (20 mg total) by mouth once daily.    gabapentin (NEURONTIN) 300 MG capsule Take 300 mg by mouth 2 (two) times daily.    insulin aspart U-100 (NOVOLOG) 100 unit/mL (3 mL) InPn pen Inject 5 Units into the skin 3 (three) times daily with meals.    insulin aspart U-100 (NOVOLOG) 100 unit/mL injection Inject 2-10 Units into the skin 3 (three) times daily before meals. 0-149=0  150-200: 2 units  201-250: 4 units  251-300: 6 units  301-350: 8 units  351-1000=10 units, NOTIFY MD    lancets (MICROLET LANCET) Misc 1 each by Misc.(Non-Drug; Combo Route) route 4 (four) times daily. Use to check blood sugars 4x daily    LANTUS SOLOSTAR U-100 INSULIN 100 unit/mL (3 mL) InPn pen Inject 12 Units into the skin 2 (two) times a day.    menthol-zinc oxide (CALMOSEPTINE) 0.44-20.6 % Oint Apply topically as needed.    multivitamin (THERAGRAN) per tablet Take 1 tablet by mouth once daily.    NIFEdipine (PROCARDIA-XL) 90 MG (OSM) 24 hr tablet Take 1 tablet (90 mg total) by mouth once daily.    oxyCODONE (ROXICODONE) 5 MG immediate release tablet Take 1 tablet (5 mg total) by mouth every 24 hours as needed for Pain.    " pantoprazole (PROTONIX) 40 MG tablet Take 40 mg by mouth once daily. Before breakfast    potassium chloride SA (K-DUR,KLOR-CON) 20 MEQ tablet Take 20 mEq by mouth 2 (two) times daily.    povidone-iodine (BETADINE) 10 % external solution Apply topically as needed for Wound Care.    SANTYL ointment Apply topically once daily. unknown    tamsulosin (FLOMAX) 0.4 mg Cap Take 0.4 mg by mouth once daily.    TRUE METRIX GLUCOSE METER Misc     zinc sulfate (ZINCATE) 50 mg zinc (220 mg) capsule Take 220 mg by mouth every morning.     Antibiotics (From admission, onward)      Start     Stop Route Frequency Ordered    10/02/24 1300  DAPTOmycin (CUBICIN) 665 mg in 0.9% NaCl SolP 50 mL IVPB         -- IV Every 24 hours (non-standard times) 10/02/24 1149    09/29/24 2230  piperacillin-tazobactam (ZOSYN) 4.5 g in D5W 100 mL IVPB (MB+)         -- IV Every 8 hours (non-standard times) 09/29/24 1919          Antifungals (From admission, onward)      None          Antivirals (From admission, onward)      None             Immunization History   Administered Date(s) Administered    COVID-19, MRNA, LN-S, PF (MODERNA FULL 0.5 ML DOSE) 05/24/2021, 05/24/2021, 07/13/2021, 07/13/2021    Influenza (FLUAD) - Quadrivalent - Adjuvanted - PF *Preferred* (65+) 12/21/2023    Influenza - Quadrivalent - PF *Preferred* (6 months and older) 10/08/2020    Influenza - Trivalent - Afluria, Fluzone MDV 10/08/2020, 09/21/2021, 09/15/2022    PPD Test 12/09/2020, 03/02/2021, 06/21/2022, 09/19/2023, 03/18/2024    Pneumococcal Conjugate - 13 Valent 10/16/2018, 10/16/2018       Family History       Problem Relation (Age of Onset)    Cancer Brother    Diabetes Mother, Sister    Heart disease Mother    Stroke Sister          Social History     Socioeconomic History    Marital status: Single   Tobacco Use    Smoking status: Never    Smokeless tobacco: Never   Substance and Sexual Activity    Alcohol use: No     Comment: occassional    Drug use: No    Sexual  activity: Not Currently   Social History Narrative    Not currently working; lives with family     Social Drivers of Health     Financial Resource Strain: Low Risk  (10/2/2024)    Overall Financial Resource Strain (CARDIA)     Difficulty of Paying Living Expenses: Not hard at all   Food Insecurity: No Food Insecurity (10/2/2024)    Hunger Vital Sign     Worried About Running Out of Food in the Last Year: Never true     Ran Out of Food in the Last Year: Never true   Transportation Needs: No Transportation Needs (10/2/2024)    TRANSPORTATION NEEDS     Transportation : No   Physical Activity: Insufficiently Active (9/30/2024)    Exercise Vital Sign     Days of Exercise per Week: 3 days     Minutes of Exercise per Session: 20 min   Stress: Stress Concern Present (10/2/2024)    [employer] - Occupational Stress Questionnaire     Feeling of Stress : To some extent   Housing Stability: Low Risk  (10/2/2024)    Housing Stability Vital Sign     Unable to Pay for Housing in the Last Year: No     Homeless in the Last Year: No     Review of Systems   Constitutional:  Negative for appetite change, chills, diaphoresis, fatigue, fever and unexpected weight change.   HENT:  Negative for congestion, ear pain, sore throat and tinnitus.    Eyes:  Negative for pain, redness and visual disturbance.   Respiratory:  Negative for cough, shortness of breath and wheezing.    Cardiovascular:  Negative for chest pain, palpitations and leg swelling.   Gastrointestinal:  Negative for abdominal pain, constipation, diarrhea, rectal pain and vomiting.   Endocrine: Negative for cold intolerance and heat intolerance.   Genitourinary:  Negative for dysuria, flank pain, frequency, hematuria and urgency.   Musculoskeletal:  Negative for arthralgias, back pain, myalgias and neck pain.   Skin:  Positive for wound. Negative for rash.   Allergic/Immunologic: Negative for immunocompromised state.   Neurological:  Negative for  dizziness, light-headedness, numbness and headaches.   Hematological:  Negative for adenopathy. Does not bruise/bleed easily.   Psychiatric/Behavioral:  Negative for confusion and sleep disturbance. The patient is not nervous/anxious.      Objective:     Vital Signs (Most Recent):  Temp: 97.7 °F (36.5 °C) (10/02/24 2026)  Pulse: 84 (10/02/24 2026)  Resp: 16 (10/02/24 2026)  BP: (!) 145/64 (10/02/24 2026)  SpO2: 98 % (10/02/24 1552) Vital Signs (24h Range):  Temp:  [97.7 °F (36.5 °C)-98.9 °F (37.2 °C)] 97.7 °F (36.5 °C)  Pulse:  [51-84] 84  Resp:  [16-18] 16  SpO2:  [92 %-100 %] 98 %  BP: (130-145)/(60-64) 145/64     Weight: 83.4 kg (183 lb 13.8 oz)  Body mass index is 27.15 kg/m².    Estimated Creatinine Clearance: 58 mL/min (based on SCr of 1.1 mg/dL).     Physical Exam  Constitutional:       General: He is not in acute distress.     Appearance: He is well-developed. He is ill-appearing. He is not toxic-appearing or diaphoretic.       HENT:      Head: Normocephalic and atraumatic.   Cardiovascular:      Rate and Rhythm: Normal rate and regular rhythm.      Heart sounds: Normal heart sounds. No murmur heard.     No friction rub. No gallop.   Pulmonary:      Effort: Pulmonary effort is normal. No respiratory distress.      Breath sounds: Normal breath sounds. No wheezing or rales.   Abdominal:      General: Bowel sounds are normal. There is no distension.      Palpations: Abdomen is soft. There is no mass.      Tenderness: There is no abdominal tenderness. There is no guarding or rebound.   Skin:     General: Skin is warm and dry.   Neurological:      Mental Status: He is alert and oriented to person, place, and time.   Psychiatric:         Behavior: Behavior normal.                                Significant Labs: Blood Culture:   Recent Labs   Lab 06/28/24  2251 06/28/24  2252 07/25/24 2114 07/25/24 2115 09/29/24  1446   LABBLOO No growth after 5 days. No growth after 5 days. No growth after 5 days. No growth  after 5 days. No Growth to date  No Growth to date  No Growth to date  No Growth to date  No Growth to date  No Growth to date  No Growth to date  No Growth to date     CBC:   Recent Labs   Lab 10/01/24  0249 10/02/24  0514   WBC 13.44* 10.08   HGB 7.1* 6.8*   HCT 23.7* 22.1*   * 527*     CMP:   Recent Labs   Lab 10/01/24  0249 10/02/24  0514    139   K 4.2 3.7    103   CO2 25 26   * 115*   BUN 24* 23   CREATININE 1.0 1.1   CALCIUM 8.6* 8.2*   PROT 5.9* 6.3   ALBUMIN 1.2* 1.4*   BILITOT 0.2 0.3   ALKPHOS 234* 179*   AST 25 18   ALT 28 25   ANIONGAP 7* 10     Urine Culture:   Recent Labs   Lab 05/08/24  2121 05/19/24  2102 07/25/24  1257 09/29/24  1722   LABURIN PROVIDENCIA STUARTII  >100,000 cfu/ml  * CANDIDA GLABRATA  10,000 - 49,999 cfu/ml  Treatment of asymptomatic candiduria is not recommended (except for   specific populations). Candida isolated in the urine typically   represents colonization. If an indwelling urinary catheter is present  it should be removed or replaced.  * No growth No growth     Urine Studies:   Recent Labs   Lab 08/05/24  1515 09/29/24  1722   COLORU Yellow Colorless*   APPEARANCEUA Hazy* Clear   PHUR 6.0 6.0   SPECGRAV 1.015 1.010   PROTEINUA 1+* Negative   GLUCUA Negative Negative   KETONESU Negative Negative   BILIRUBINUA Negative Negative   OCCULTUA 1+* 1+*   NITRITE Negative Negative   UROBILINOGEN Negative  --    LEUKOCYTESUR 3+* 3+*   RBCUA 25* 15*   WBCUA >100* >100*   BACTERIA Occasional Few*   SQUAMEPITHEL  --  1   HYALINECASTS 0  --      Wound Culture:   Recent Labs   Lab 07/01/24  1241 09/29/24  1628   LABAERO METHICILLIN RESISTANT STAPHYLOCOCCUS AUREUS  Few  *  PSEUDOMONAS AERUGINOSA   Few  * PROTEUS MIRABILIS  Few  Skin bossman also present  *     All pertinent labs within the past 24 hours have been reviewed.    Significant Imaging: I have reviewed all pertinent imaging results/findings within the past 24 hours.  MRI Midfoot WO Contrast RT  [[id number]] (Abnormal) Resulted: 10/01/24 1210   Order Status: Completed Updated: 10/01/24 1213   Narrative:     EXAMINATION:  MRI FOREFOOT WO CONTRAST RT; MRI HINDFOOT WO CONTRAST RT; MRI MIDFOOT WO CONTRAST RT    CLINICAL HISTORY:  Osteomyelitis, foot;    TECHNIQUE:  MRI of the entire right foot with multiplanar and multisequence imaging.  No intravenous contrast was administered.    COMPARISON:  Right foot radiograph 09/30/2024, right foot CT 06/28/2024, and right foot MRI 05/16/2023.    FINDINGS:  Exam limited by motion artifact.    BONES: Confluent marrow edema throughout the posterior 2/3 of the calcaneus with corresponding T1 medullary hypointensity and suspected posterior cortical irregularity.  Comfort marrow edema throughout the lateral half of the lateral malleolus with grossly preserved cortical margins and T1 medullary signal.  Confluent marrow edema throughout the 1st distal phalanx with preserved T1 medullary signal and cortical margins.  Contour irregularity of the 5th metatarsal shaft, possibly sequela of a healed fracture or remote infection.  No acute fractures.  No avascular necrosis.    JOINTS: Scattered hindfoot, midfoot and forefoot osteoarthritis.  No subluxation or dislocation.  No joint effusions.    TENDONS: Full-thickness, partial width tearing of the insertional fibers of the Achilles tendon.  Posterior tibial, flexor digitorum longus, flexor hallucis longus, peroneus longus, peroneus brevis and extensor tendons appear grossly intact.    MUSCLES: Severe fatty degeneration and patchy edema of the visualized musculature, likely sequela of chronic denervation:    MISCELLANEOUS: Large soft tissue ulcer overlying the posterior and lateral aspects of the calcaneus.  Soft tissue ulceration overlying the lateral malleolus and 1st distal phalanx.  Generalized subcutaneous edema most pronounced along the dorsum of the foot.  No fluid collection.  Chronic thickening of the plantar aponeurosis.    Impression:       1. Acute osteomyelitis of the posterior 2/3 of the calcaneus with a large overlying soft tissue ulcer that involves the distal insertional fibers of the Achilles tendon.  2. Early osteomyelitis of the lateral malleolus and 1st distal phalanx with overlying soft tissue ulcers.  3. Additional findings, as above.  This report was flagged in Epic as abnormal.    Electronically signed by resident: Raegan Melvin  Date: 10/01/2024  Time: 11:26    Electronically signed by: Erik Hart MD  Date: 10/01/2024  Time: 12:10   MRI Hindfoot WO Contrast RT [[id number]] (Abnormal) Resulted: 10/01/24 1210   Order Status: Completed Updated: 10/01/24 1213   Narrative:     EXAMINATION:  MRI FOREFOOT WO CONTRAST RT; MRI HINDFOOT WO CONTRAST RT; MRI MIDFOOT WO CONTRAST RT    CLINICAL HISTORY:  Osteomyelitis, foot;    TECHNIQUE:  MRI of the entire right foot with multiplanar and multisequence imaging.  No intravenous contrast was administered.    COMPARISON:  Right foot radiograph 09/30/2024, right foot CT 06/28/2024, and right foot MRI 05/16/2023.    FINDINGS:  Exam limited by motion artifact.    BONES: Confluent marrow edema throughout the posterior 2/3 of the calcaneus with corresponding T1 medullary hypointensity and suspected posterior cortical irregularity.  Comfort marrow edema throughout the lateral half of the lateral malleolus with grossly preserved cortical margins and T1 medullary signal.  Confluent marrow edema throughout the 1st distal phalanx with preserved T1 medullary signal and cortical margins.  Contour irregularity of the 5th metatarsal shaft, possibly sequela of a healed fracture or remote infection.  No acute fractures.  No avascular necrosis.    JOINTS: Scattered hindfoot, midfoot and forefoot osteoarthritis.  No subluxation or dislocation.  No joint effusions.    TENDONS: Full-thickness, partial width tearing of the insertional fibers of the Achilles tendon.  Posterior tibial, flexor digitorum  longus, flexor hallucis longus, peroneus longus, peroneus brevis and extensor tendons appear grossly intact.    MUSCLES: Severe fatty degeneration and patchy edema of the visualized musculature, likely sequela of chronic denervation:    MISCELLANEOUS: Large soft tissue ulcer overlying the posterior and lateral aspects of the calcaneus.  Soft tissue ulceration overlying the lateral malleolus and 1st distal phalanx.  Generalized subcutaneous edema most pronounced along the dorsum of the foot.  No fluid collection.  Chronic thickening of the plantar aponeurosis.   Impression:       1. Acute osteomyelitis of the posterior 2/3 of the calcaneus with a large overlying soft tissue ulcer that involves the distal insertional fibers of the Achilles tendon.  2. Early osteomyelitis of the lateral malleolus and 1st distal phalanx with overlying soft tissue ulcers.  3. Additional findings, as above.  This report was flagged in Epic as abnormal.    Electronically signed by resident: Raegan Melvin  Date: 10/01/2024  Time: 11:26    Electronically signed by: Erik Hart MD  Date: 10/01/2024  Time: 12:10   MRI Forefoot WO Contrast RT [[id number]] (Abnormal) Resulted: 10/01/24 1210   Order Status: Completed Updated: 10/01/24 1213   Narrative:     EXAMINATION:  MRI FOREFOOT WO CONTRAST RT; MRI HINDFOOT WO CONTRAST RT; MRI MIDFOOT WO CONTRAST RT    CLINICAL HISTORY:  Osteomyelitis, foot;    TECHNIQUE:  MRI of the entire right foot with multiplanar and multisequence imaging.  No intravenous contrast was administered.    COMPARISON:  Right foot radiograph 09/30/2024, right foot CT 06/28/2024, and right foot MRI 05/16/2023.    FINDINGS:  Exam limited by motion artifact.    BONES: Confluent marrow edema throughout the posterior 2/3 of the calcaneus with corresponding T1 medullary hypointensity and suspected posterior cortical irregularity.  Comfort marrow edema throughout the lateral half of the lateral malleolus with grossly preserved  cortical margins and T1 medullary signal.  Confluent marrow edema throughout the 1st distal phalanx with preserved T1 medullary signal and cortical margins.  Contour irregularity of the 5th metatarsal shaft, possibly sequela of a healed fracture or remote infection.  No acute fractures.  No avascular necrosis.    JOINTS: Scattered hindfoot, midfoot and forefoot osteoarthritis.  No subluxation or dislocation.  No joint effusions.    TENDONS: Full-thickness, partial width tearing of the insertional fibers of the Achilles tendon.  Posterior tibial, flexor digitorum longus, flexor hallucis longus, peroneus longus, peroneus brevis and extensor tendons appear grossly intact.    MUSCLES: Severe fatty degeneration and patchy edema of the visualized musculature, likely sequela of chronic denervation:    MISCELLANEOUS: Large soft tissue ulcer overlying the posterior and lateral aspects of the calcaneus.  Soft tissue ulceration overlying the lateral malleolus and 1st distal phalanx.  Generalized subcutaneous edema most pronounced along the dorsum of the foot.  No fluid collection.  Chronic thickening of the plantar aponeurosis.   Impression:       1. Acute osteomyelitis of the posterior 2/3 of the calcaneus with a large overlying soft tissue ulcer that involves the distal insertional fibers of the Achilles tendon.  2. Early osteomyelitis of the lateral malleolus and 1st distal phalanx with overlying soft tissue ulcers.  3. Additional findings, as above.  This report was flagged in Epic as abnormal.    Electronically signed by resident: Raegan Melvin  Date: 10/01/2024  Time: 11:26    Electronically signed by: Erik Hart MD  Date: 10/01/2024  Time: 12:10   US Lower Extremity Arteries Bilateral [[id number]] Resulted: 09/30/24 1712   Order Status: Completed Updated: 09/30/24 1715   Narrative:     EXAMINATION:  US LOWER EXTREMITY ARTERIES BILATERAL    CLINICAL HISTORY:  wound healing;    TECHNIQUE:  Bilateral spectral, color and  grayscale images of the large arteries of both lower extremities were performed.    COMPARISON:  Multiple ultrasounds, most recent 07/26/2024    FINDINGS:  Right lower extremity: Peak systolic velocity in the right lower extremity arterial system measures 99 cm/sec in the anterior tibial artery.  No evidence of a hemodynamically significant stenosis.  There are triphasic waveforms throughout most of the right lower extremity.  There are monophasic waveforms in the posterior tibial artery.    Left lower extremity: History of above the knee amputation.  Peak systolic velocity in the left lower extremity arterial system measures 80 cm/sec in the deep femoral artery.  No evidence of a hemodynamically significant stenosis.  There are triphasic and biphasic waveforms throughout most of the left lower extremity.  There are monophasic waveforms in the distal femoral artery.    Miscellaneous: Arrhythmia.  Prominent right inguinal lymph node measuring 0.9 cm short axis, presumably reactive.   Impression:       Left above the knee amputation.    No hemodynamically significant arterial stenosis in either lower extremity.    Arrhythmia.  Correlate with EKG.      Electronically signed by: Rishi Ramos  Date: 09/30/2024  Time: 17:12   X-Ray Foot Complete Right [[id number]] (Abnormal) Resulted: 09/30/24 1426   Order Status: Completed Updated: 09/30/24 1429   Narrative:     EXAMINATION:  XR FOOT COMPLETE 3 VIEW RIGHT    CLINICAL HISTORY:  R heel wound;.    TECHNIQUE:  AP, lateral, and oblique views of the right foot were performed.    COMPARISON:  06/28/2024    FINDINGS:  Three views right foot.    There is osteopenia.  There is remote injury of the 5th metatarsal.  There is motion blurring.  There is questionable periosteal reaction about the distal aspect of the 5th metatarsal versus motion blurring.  There is edema about the foot.  There are degenerative changes of the calcaneus as well as of the dorsal foot.  There is vascular  calcification.  There is skin wound overlying the calcaneus.   Impression:       This report was flagged in Epic as abnormal.    1. Questionable indistinctness about the distal aspect of the 5th metatarsal, may reflect subacute injury to the region however infectious process not excluded.  Please note, evaluation is limited given motion blurring in the region.  Correlation is advised.  2. Diffuse edema about the foot noting skin wound at the level of the calcaneus.  Correlation is needed to exclude exposed bone.  3. There appears to be increased erosive change of the posterior aspect of the calcaneus since the previous examination.  Underlying or chronic osteomyelitis not excluded.      Electronically signed by: Milton Dove MD  Date: 09/30/2024  Time: 14:26   X-Ray Chest AP Portable [[id number]] Resulted: 09/29/24 1759   Order Status: Completed Updated: 09/29/24 1801   Narrative:     EXAMINATION:  XR CHEST AP PORTABLE    CLINICAL HISTORY:  wound infection;    TECHNIQUE:  Single frontal view of the chest was performed.    COMPARISON:  07/25/2024    FINDINGS:  The cardiomediastinal silhouette is not enlarged.  There is elevation of the right hemidiaphragm..  There is no pleural effusion.  The trachea is midline.  The lungs are symmetrically expanded bilaterally with coarse interstitial attenuation.  No large focal consolidation seen.  There is no pneumothorax.  The osseous structures are remarkable for degenerative change..   Impression:       Course interstitial attenuation, similar to the prior exam.  Superimposed edema is a consideration.      Electronically signed by: Milton Dove MD  Date: 09/29/2024  Time: 17:59      Imaging History     2024    Date Procedure Name Study Review Link PACS Link Status Accession Number Location   10/02/24 07:55 PM Cardiac monitoring strips Study Review  Final     10/02/24 03:09 PM Cardiac monitoring strips Study Review  Final     10/01/24 10:22 PM Cardiac monitoring strips  Study Review  Final     10/01/24 11:23 AM MRI Hindfoot WO Contrast RT Study Review  Images Final [id number] [facility]   10/01/24 11:23 AM MRI Midfoot WO Contrast RT Study Review  Images Final [id number] [facility]   10/01/24 11:23 AM MRI Forefoot WO Contrast RT Study Review  Images Final [id number] [facility]   10/01/24 09:44 AM Cardiac monitoring strips Study Review  Final     09/30/24 11:22 PM Cardiac monitoring strips Study Review  Final     09/30/24 05:36 PM Cardiac monitoring strips Study Review  Final     09/30/24 04:54 PM US Lower Extremity Arteries Bilateral Study Review  Images Final [id number] [facility] [facility]   09/30/24 01:10 PM X-Ray Foot Complete Right Study Review  Images Final [id number] [facility]   09/29/24 10:35 PM Cardiac monitoring strips Study Review  Final     09/29/24 05:10 PM X-Ray Chest AP Portable Study Review  Images Final [id number] [facility]      Yes

## 2024-10-04 ENCOUNTER — DOCUMENTATION ONLY (OUTPATIENT)
Dept: CARDIOLOGY | Facility: HOSPITAL | Age: 74
End: 2024-10-04
Payer: MEDICARE

## 2024-10-04 ENCOUNTER — DOCUMENTATION ONLY (OUTPATIENT)
Dept: ELECTROPHYSIOLOGY | Facility: CLINIC | Age: 74
End: 2024-10-04
Payer: MEDICARE

## 2024-10-04 LAB
ALBUMIN SERPL BCP-MCNC: 1.4 G/DL (ref 3.5–5.2)
ALP SERPL-CCNC: 129 U/L (ref 55–135)
ALT SERPL W/O P-5'-P-CCNC: 15 U/L (ref 10–44)
ANION GAP SERPL CALC-SCNC: 8 MMOL/L (ref 8–16)
AST SERPL-CCNC: 13 U/L (ref 10–40)
BACTERIA BLD CULT: NORMAL
BACTERIA BLD CULT: NORMAL
BASOPHILS # BLD AUTO: 0.06 K/UL (ref 0–0.2)
BASOPHILS NFR BLD: 0.5 % (ref 0–1.9)
BILIRUB SERPL-MCNC: 0.2 MG/DL (ref 0.1–1)
BUN SERPL-MCNC: 16 MG/DL (ref 8–23)
CALCIUM SERPL-MCNC: 8.2 MG/DL (ref 8.7–10.5)
CHLORIDE SERPL-SCNC: 106 MMOL/L (ref 95–110)
CO2 SERPL-SCNC: 26 MMOL/L (ref 23–29)
CREAT SERPL-MCNC: 1.1 MG/DL (ref 0.5–1.4)
CRP SERPL-MCNC: 97.1 MG/L (ref 0–8.2)
DIFFERENTIAL METHOD BLD: ABNORMAL
EOSINOPHIL # BLD AUTO: 0.7 K/UL (ref 0–0.5)
EOSINOPHIL NFR BLD: 6 % (ref 0–8)
ERYTHROCYTE [DISTWIDTH] IN BLOOD BY AUTOMATED COUNT: 16.3 % (ref 11.5–14.5)
EST. GFR  (NO RACE VARIABLE): >60 ML/MIN/1.73 M^2
GLUCOSE SERPL-MCNC: 233 MG/DL (ref 70–110)
HCT VFR BLD AUTO: 24.1 % (ref 40–54)
HGB BLD-MCNC: 7.5 G/DL (ref 14–18)
IMM GRANULOCYTES # BLD AUTO: 0.05 K/UL (ref 0–0.04)
IMM GRANULOCYTES NFR BLD AUTO: 0.5 % (ref 0–0.5)
LYMPHOCYTES # BLD AUTO: 1.4 K/UL (ref 1–4.8)
LYMPHOCYTES NFR BLD: 13 % (ref 18–48)
MAGNESIUM SERPL-MCNC: 1.8 MG/DL (ref 1.6–2.6)
MCH RBC QN AUTO: 25.9 PG (ref 27–31)
MCHC RBC AUTO-ENTMCNC: 31.1 G/DL (ref 32–36)
MCV RBC AUTO: 83 FL (ref 82–98)
MONOCYTES # BLD AUTO: 0.7 K/UL (ref 0.3–1)
MONOCYTES NFR BLD: 6 % (ref 4–15)
NEUTROPHILS # BLD AUTO: 8.2 K/UL (ref 1.8–7.7)
NEUTROPHILS NFR BLD: 74 % (ref 38–73)
NRBC BLD-RTO: 0 /100 WBC
PHOSPHATE SERPL-MCNC: 2.8 MG/DL (ref 2.7–4.5)
PLATELET # BLD AUTO: 470 K/UL (ref 150–450)
PMV BLD AUTO: 8.5 FL (ref 9.2–12.9)
POCT GLUCOSE: 176 MG/DL (ref 70–110)
POCT GLUCOSE: 289 MG/DL (ref 70–110)
POTASSIUM SERPL-SCNC: 3.8 MMOL/L (ref 3.5–5.1)
PROT SERPL-MCNC: 6.1 G/DL (ref 6–8.4)
RBC # BLD AUTO: 2.9 M/UL (ref 4.6–6.2)
SODIUM SERPL-SCNC: 140 MMOL/L (ref 136–145)
WBC # BLD AUTO: 11.08 K/UL (ref 3.9–12.7)

## 2024-10-04 PROCEDURE — 25000003 PHARM REV CODE 250: Mod: HCNC

## 2024-10-04 PROCEDURE — 63600175 PHARM REV CODE 636 W HCPCS: Mod: HCNC

## 2024-10-04 PROCEDURE — 20600001 HC STEP DOWN PRIVATE ROOM: Mod: HCNC

## 2024-10-04 PROCEDURE — 85025 COMPLETE CBC W/AUTO DIFF WBC: CPT | Mod: HCNC

## 2024-10-04 PROCEDURE — 25500020 PHARM REV CODE 255: Mod: HCNC | Performed by: STUDENT IN AN ORGANIZED HEALTH CARE EDUCATION/TRAINING PROGRAM

## 2024-10-04 PROCEDURE — 36415 COLL VENOUS BLD VENIPUNCTURE: CPT | Mod: HCNC

## 2024-10-04 PROCEDURE — 84100 ASSAY OF PHOSPHORUS: CPT | Mod: HCNC

## 2024-10-04 PROCEDURE — 25000003 PHARM REV CODE 250: Mod: HCNC | Performed by: PHYSICIAN ASSISTANT

## 2024-10-04 PROCEDURE — A9585 GADOBUTROL INJECTION: HCPCS | Mod: HCNC | Performed by: STUDENT IN AN ORGANIZED HEALTH CARE EDUCATION/TRAINING PROGRAM

## 2024-10-04 PROCEDURE — 83735 ASSAY OF MAGNESIUM: CPT | Mod: HCNC

## 2024-10-04 PROCEDURE — 86140 C-REACTIVE PROTEIN: CPT | Mod: HCNC

## 2024-10-04 PROCEDURE — 80053 COMPREHEN METABOLIC PANEL: CPT | Mod: HCNC

## 2024-10-04 PROCEDURE — 99233 SBSQ HOSP IP/OBS HIGH 50: CPT | Mod: HCNC,,, | Performed by: PHYSICIAN ASSISTANT

## 2024-10-04 PROCEDURE — 27000207 HC ISOLATION: Mod: HCNC

## 2024-10-04 PROCEDURE — 63600175 PHARM REV CODE 636 W HCPCS: Mod: HCNC | Performed by: PHYSICIAN ASSISTANT

## 2024-10-04 RX ORDER — INSULIN GLARGINE 100 [IU]/ML
8 INJECTION, SOLUTION SUBCUTANEOUS 2 TIMES DAILY
Status: DISCONTINUED | OUTPATIENT
Start: 2024-10-04 | End: 2024-10-06

## 2024-10-04 RX ORDER — AMOXICILLIN AND CLAVULANATE POTASSIUM 875; 125 MG/1; MG/1
1 TABLET, FILM COATED ORAL 2 TIMES DAILY
Status: ON HOLD | COMMUNITY
Start: 2024-09-27 | End: 2024-10-09 | Stop reason: HOSPADM

## 2024-10-04 RX ORDER — GADOBUTROL 604.72 MG/ML
9 INJECTION INTRAVENOUS
Status: COMPLETED | OUTPATIENT
Start: 2024-10-04 | End: 2024-10-04

## 2024-10-04 RX ADMIN — TAMSULOSIN HYDROCHLORIDE 0.4 MG: 0.4 CAPSULE ORAL at 08:10

## 2024-10-04 RX ADMIN — INSULIN GLARGINE 8 UNITS: 100 INJECTION, SOLUTION SUBCUTANEOUS at 08:10

## 2024-10-04 RX ADMIN — GADOBUTROL 9 ML: 604.72 INJECTION INTRAVENOUS at 01:10

## 2024-10-04 RX ADMIN — FUROSEMIDE 20 MG: 20 TABLET ORAL at 08:10

## 2024-10-04 RX ADMIN — GABAPENTIN 300 MG: 300 CAPSULE ORAL at 08:10

## 2024-10-04 RX ADMIN — POLYETHYLENE GLYCOL 3350 17 G: 17 POWDER, FOR SOLUTION ORAL at 08:10

## 2024-10-04 RX ADMIN — THERA TABS 1 TABLET: TAB at 08:10

## 2024-10-04 RX ADMIN — OXYCODONE HYDROCHLORIDE 10 MG: 5 TABLET ORAL at 08:10

## 2024-10-04 RX ADMIN — ZINC SULFATE 220 MG (50 MG) CAPSULE 220 MG: CAPSULE at 08:10

## 2024-10-04 RX ADMIN — OXYCODONE HYDROCHLORIDE AND ACETAMINOPHEN 500 MG: 500 TABLET ORAL at 08:10

## 2024-10-04 RX ADMIN — PIPERACILLIN SODIUM AND TAZOBACTAM SODIUM 4.5 G: 4; .5 INJECTION, POWDER, FOR SOLUTION INTRAVENOUS at 10:10

## 2024-10-04 RX ADMIN — ERGOCALCIFEROL 50000 UNITS: 1.25 CAPSULE ORAL at 08:10

## 2024-10-04 RX ADMIN — SENNOSIDES 8.6 MG: 8.6 TABLET, FILM COATED ORAL at 08:10

## 2024-10-04 RX ADMIN — INSULIN GLARGINE 8 UNITS: 100 INJECTION, SOLUTION SUBCUTANEOUS at 09:10

## 2024-10-04 RX ADMIN — INSULIN ASPART 1 UNITS: 100 INJECTION, SOLUTION INTRAVENOUS; SUBCUTANEOUS at 08:10

## 2024-10-04 RX ADMIN — PIPERACILLIN SODIUM AND TAZOBACTAM SODIUM 4.5 G: 4; .5 INJECTION, POWDER, FOR SOLUTION INTRAVENOUS at 06:10

## 2024-10-04 RX ADMIN — OXYCODONE HYDROCHLORIDE AND ACETAMINOPHEN 500 MG: 500 TABLET ORAL at 09:10

## 2024-10-04 RX ADMIN — DAPTOMYCIN 665 MG: 350 INJECTION, POWDER, LYOPHILIZED, FOR SOLUTION INTRAVENOUS at 01:10

## 2024-10-04 RX ADMIN — PIPERACILLIN SODIUM AND TAZOBACTAM SODIUM 4.5 G: 4; .5 INJECTION, POWDER, FOR SOLUTION INTRAVENOUS at 02:10

## 2024-10-04 NOTE — MEDICAL/APP STUDENT
"Saji Castañeda is a 74 y.o. male patient w/ hx of DM, PAD s/p L AKA 3/2024, chronic anemia, chronic fung, paroxysmal Afib on apixaban, CHB s/p PM 10/2023, CKD, HTN, R foot osteo now presenting from [facility] for L hip wound prurulent drainage. On admission patient had a leukocytosis, he had a recent admission with micro notable for  pseudomonas (zosyn sensitive) and MRSA.     Patient reports feeling sore from debridement of R foot 10/3 and sacral wounds 10/1. L hip wound cultures postive for Proteus Mirabilis. Gen surg recommended no further surgical intervention and signed off. MRI of pelvis pending. MRI of R foot, which revealed acute osteomyelitis. R foot cultures pending, no organisms seen on gram stain. ID and Podiatry following. Continuing Zoysn, switched to Daptomycin. Plan to stop Daptomycin per ID. Can resume home Eliquis at this time.    Active Hospital Problems    Diagnosis  POA    *Osteomyelitis [M86.9]  Yes    Thrombocytosis [D75.839]  Yes    Anemia [D64.9]  Yes    Chronic indwelling Fung catheter [Z97.8]  Not Applicable    Sacral wound [S31.000A]  Yes    Stage 3b chronic kidney disease [N18.32]  Yes    Paroxysmal atrial fibrillation [I48.0]  Yes     Chronic    Wound of left leg [S81.802A]  Yes    Type 2 diabetes mellitus, with long-term current use of insulin [E11.9, Z79.4]  Not Applicable    Essential hypertension [I10]  Yes     Chronic      Resolved Hospital Problems   No resolved problems to display.     Temp: 98.1 °F (36.7 °C) (10/04/24 0722)  Pulse: 78 (10/04/24 0722)  Resp: 17 (10/04/24 0722)  BP: 139/65 (10/04/24 0722)  SpO2: 100 % (10/04/24 0722)  Weight: 83.4 kg (183 lb 13.8 oz) (09/29/24 2000)  Height: 5' 9" (175.3 cm) (09/29/24 2000)    Review of Systems   Constitutional:  Negative for chills, diaphoresis, fever and weight loss.   HENT:  Negative for sore throat and tinnitus.    Eyes:  Negative for blurred vision, photophobia and redness.   Respiratory:  Negative for cough, " hemoptysis, shortness of breath and stridor.    Cardiovascular:  Negative for chest pain, palpitations and leg swelling.   Gastrointestinal:  Negative for abdominal pain, nausea and vomiting.   Genitourinary:  Negative for dysuria, frequency and urgency.   Musculoskeletal:  Negative for myalgias and neck pain.   Skin:  Negative for itching and rash.        Chronic sacral wounds, R heel wound   Neurological:  Negative for dizziness, tingling and headaches.   Endo/Heme/Allergies:  Negative for environmental allergies and polydipsia.   Psychiatric/Behavioral:  Negative for depression and hallucinations.        Physical Exam  Constitutional:       General: He is not in acute distress.     Appearance: Normal appearance. He is normal weight. He is not toxic-appearing.   HENT:      Head: Normocephalic and atraumatic.      Nose: No congestion or rhinorrhea.   Eyes:      General: No scleral icterus.     Pupils: Pupils are equal, round, and reactive to light.   Cardiovascular:      Rate and Rhythm: Normal rate. Rhythm irregular.      Pulses: Normal pulses.      Heart sounds: No murmur heard.     No gallop.   Pulmonary:      Effort: Pulmonary effort is normal. No respiratory distress.      Breath sounds: Normal breath sounds. No stridor. No wheezing or rhonchi.   Abdominal:      General: Abdomen is flat.      Palpations: Abdomen is soft.      Tenderness: There is abdominal tenderness.      Comments: Tenderness in RUQ upon palpation   Musculoskeletal:         General: No swelling.      Cervical back: Normal range of motion. No rigidity.      Right lower leg: No edema.      Left lower leg: No edema.   Lymphadenopathy:      Cervical: No cervical adenopathy.   Skin:     General: Skin is warm and dry.      Coloration: Skin is not jaundiced or pale.      Findings: Lesion present.   Neurological:      General: No focal deficit present.      Mental Status: He is alert.      Cranial Nerves: No cranial nerve deficit.      Sensory:  Sensory deficit present.   Psychiatric:         Mood and Affect: Mood normal.         Behavior: Behavior normal.         Thought Content: Thought content normal.         Judgment: Judgment normal.       Assessment and Plan      Chronic Osteomyelitis of R heel  L hip wound infection, Proteus  MRSA history  History of Carbapenem resistant Pseudomonas   - Recent history of IV Vanc and Zoysn 7/25  - extensive grade 3-4 sacral wounds, L hip wound purulence  - blood cultures no growth x5 days  - CXR shows coarse interstitial attenuation, similar to prior  - WBC elevated 23.2 9/30, trending down, 11.08  - hip wound cultures reveal proteus mirabilis, sensitive to Zoysn  - continue oxycodone and tylenol PRN for pain  - Gen Surgery signing off, performed drainage of L hip abscess and debridement of sacral wound 10/1, rec no further intervention  - Podiatry following, MRI revealed acute osteomyelitis of the posterior 2/3 of the calcaneous and early osteo of the lateral malleolus and 1st distal phalanx, recommending medial tx, no planned intervention  - MRI pelvis pending   - R foot wound cultures pending, negative Gram stain  - Id consulted, recommends to continue Zoysn and d/c Daptomycin 10/4     Paroxysmal Atrial Fibrillation on Eliquis   Pacemaker   PVD  CHF  - echo on 7/2024 showed EF of 55-60% with no significant valvular abnormalities   - follows with Vascular Surgeon Dr. Banda  - continue home Lasix and hold HCTZ, some UE edema  - electrolytes stable, trend CMP  - continue cardiac monitoring   - can continue home Eliquis, stable H&H      Anemia of Chronic Disease   Acute Anemia   - H&H 7.5 and 24.1  - received 1 unit of PRBCs 10/2 and 1 unit 10/3  - transfused 1 unit of PRBCs in ED  - hold iron supplement      Below Knee Amputation 3/24  - due to nonhealing L foot wound   - well-healed scar   - patient is non-ambulatory      Chronic Sams  Suspected Urinary Tract Infection  - UA shows +1 blood, 3+ leuk esterase, >100  WBCs and few bacteria, patient reports episode of dysuria yesterday and suprapubic pain on palpation   - culture shows no growth x3 day  - last Sams change 10/29        Type 2 Diabetes Mellitus on Insulin   - Hb A1c on 7/25 was 6  - continue insulin sliding scale      CKD3  - hold nephrotoxic medications  - continue Lasix at this time, hold HCTZ  - renally dose Vanc     HTN  - continuing home Lasix      Hypoalbuminemia   - albumin 1.4      Raegan Melvin  10/4/2024

## 2024-10-04 NOTE — PROGRESS NOTES
[facility] ([place] [address])  [facility] Medicine  Progress Note    Patient Name: Saji Castañeda  MRN: [id number]  Patient Class: IP- Inpatient   Admission Date: 9/29/2024  Length of Stay: 4 days  Attending Physician: Jm Rosa MD  Primary Care Provider: Vinod Elise MD        Subjective:     Principal Problem:Osteomyelitis        HPI:  Saji Castañeda is a 74 y.o. 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 Arellano, Chronic Multifactorial Anemia, BPH s/p Chronic Sams, paroxysmal A-fib on eliquis, CHB s/p PPM medtronic (10/12/2023), CKD3, Multifactorial HTN, HLD and Osteomyelitis of R foot presenting from [facility] with suspected infection of L hip wound. He is nonambulatory . Patient has chronic, extensive sacral wounds, a R heel wound and a wound on the L hip with purulence and a foul odor. The patient was recommended to come to the ED due to concerns of wound infection by nursing staff at NH. Patient reports intermittent pain at the wound sites that is controlled with Oxycodone. Patient states that wound care manages these sites at the nursing home. Patient is alert, oriented and responsive to questioning however he is a poor historian regarding his medical history. Per Chart Review the patient was admitted to the ED on 7/25 for AMS while on Vanc and Cefepime for Osteomyelitis. Cefepime neurotoxicity was suspected and therapy was switched to Vanc and Zosyn. He then developed  He was transferred to [facility] at that time and had a pacemaker placed. Patient reports currently being on oral antibiotic therapy but is unsure what he is taking. He reports taking his medications daily that the nurse at NH provides. He reports never smoking or using drugs and no longer drinks alcohol.      Patient reports chronic Sams cath usage for unknown reason that was last changed today. He reports an episode of dysuria  yesterday and endorses suprapubic pain on palpation. He denies hematuria, urinary frequency/urgency, urinary odor or abdominal pain.      He also reports a mild cough and chills for the past several weeks with scant sputum production but denies fever, chest pain, SOB or recent illness. He reports chronic diaphoresis at night that requires him to change clothes in the morning.      Overview/Hospital Course:   74 y.o. 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 Arellano, Chronic Multifactorial Anemia, BPH s/p Chronic Sams, paroxysmal A-fib on eliquis, CHB s/p PPM medtronic (10/12/2023), CKD3, Multifactorial HTN, HLD and Osteomyelitis of R foot presenting from [facility] with suspected infection of sacral wound. Started on Vanc and Zosyn given prior history of prior susceptibilities. General surgery plans for wound debridement 10/1. Wound care and podiatry following. X-ray of right foot c/f chronic osteomyelitis. MRI with acute osteomyelitis of the posterior 2/3 of the calcaneus and early osteomyelitis of the lateral malleolus and 1st distal phalanx. R heel bone biopsy and debridement done by podiatry on 10/3.     Interval History: NAEON. Afebrile, hypertensive /74 overnight. Glucose 233, increased lantus to 8 units BID. Patient reports sacral wound pain 8/10, a little better than yesterday. He says he is eating and drinking well. No new concerns.     Review of Systems   Constitutional:  Negative for appetite change.   Respiratory:  Negative for shortness of breath.    Cardiovascular:  Negative for chest pain.   Gastrointestinal:  Positive for abdominal pain. Negative for abdominal distention, diarrhea, nausea and vomiting.   Skin:  Positive for wound (painful sacral wound).     Objective:     Vital Signs (Most Recent):  Temp: 98.1 °F (36.7 °C) (10/04/24 0722)  Pulse: 78 (10/04/24 0722)  Resp: 17 (10/04/24  0722)  BP: 139/65 (10/04/24 0722)  SpO2: 100 % (10/04/24 0722) Vital Signs (24h Range):  Temp:  [98.1 °F (36.7 °C)-98.8 °F (37.1 °C)] 98.1 °F (36.7 °C)  Pulse:  [45-78] 78  Resp:  [16-18] 17  SpO2:  [99 %-100 %] 100 %  BP: (131-169)/(58-74) 139/65     Weight: 83.4 kg (183 lb 13.8 oz)  Body mass index is 27.15 kg/m².    Intake/Output Summary (Last 24 hours) at 10/4/2024 0857  Last data filed at 10/4/2024 0520  Gross per 24 hour   Intake 1053.52 ml   Output 1000 ml   Net 53.52 ml         Physical Exam  Constitutional:       General: He is not in acute distress.     Appearance: He is ill-appearing. He is not diaphoretic.   HENT:      Head: Normocephalic and atraumatic.      Right Ear: External ear normal.      Left Ear: External ear normal.      Mouth/Throat:      Mouth: Mucous membranes are dry.   Eyes:      General:         Right eye: No discharge.         Left eye: No discharge.      Extraocular Movements: Extraocular movements intact.   Cardiovascular:      Rate and Rhythm: Normal rate. Rhythm irregular.      Heart sounds: No murmur heard.  Pulmonary:      Effort: Pulmonary effort is normal. No respiratory distress.      Breath sounds: No wheezing.   Abdominal:      General: Abdomen is flat. There is no distension.      Tenderness: Tenderness: RLQ tenderness.   Musculoskeletal:      Cervical back: Normal range of motion.      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 Surgical scar well-healed  Right lower extremity with cracked, dry skin as well as right heel wound  R heel dressing in tact      Skin:     General: Skin is dry.   Neurological:      General: No focal deficit present.      Mental Status: He is alert.             Significant Labs: All pertinent labs within the past 24 hours have been reviewed.    Significant Imaging: I have reviewed all pertinent imaging results/findings within the past 24 hours.    Assessment/Plan:      * Osteomyelitis  R heal OM diagnosed during admission 6/26-7/11 with bone  cx positive for pseudomonas and MRSA. Discharged on vanc and cefepime for 6 week course.  Recent admission 8/2-8/6, at that time Cefepime discontinued due to c/f Cefepime neurotoxicity, then developed Vanc YNES, and discharged on Daptomycin and Zosyn  - extensive grade 3-4 sacral wounds, L hip wound purulence  - chronic osteomyelitis of R heel with nonhealing ulcer    - Wound Cx from L buttocks, positive for proteus susceptibilities pending  - Blood Cx NGTD  - started on IV Vanc in the ED, recommend renally dosing  - continue Vanc 1750 mg q12h   - continue Zosyn  - Wound care consulted, recommended podiatry consult, providing   - WBC elevated 13.6, likely due to wound infection  - trend CBC and CMP  - procalcitonin 0.12  - continue oxycodone and tylenol PRN for pain  - per pharmacy, obtain vanc trough on 10/01 at 6:00 before giving 4th dose, range 10-20 mcg/mL  - podiatry consulted, performed bone biopsy and debridement of the R heel on 10/3. Gram stain with no wbcs and no organisms seen. Cx in process.   - MRI R foot ordered , c/f acute osteomyelitis of 2/3 calcaneus and early osteomyelitis of lateral malleolus & 1st distal phalanx  - ID consulted, IV Dapto started 10/2/24  - MRI pelvis ordered, f/u results    Anemia  Anemia is likely due to chronic disease due to Chronic Kidney Disease. Most recent hemoglobin and hematocrit are listed below.  Recent Labs     10/01/24  0249 10/02/24  0514 10/03/24  0359   HGB 7.1* 6.8* 6.8*   HCT 23.7* 22.1* 22.3*       Plan  - Monitor serial CBC: daily  - Transfuse PRBC if patient becomes hemodynamically unstable, symptomatic or H/H drops below 7/21.  - Patient has received 1 units of PRBCs on 9/29  & 1 unit pRBCs on 10/3  - Patient's anemia is currently   7.1, below baseline 8-9  - Likely multifactorial with AOCD, as well as potentially GI bleed vs  AVM. Continue to monitor  - H&H 6.3 and 21.6   - hold iron supplement   - guaic + stool test  - Elevated ferritin, anemia 2/2 chronic  disease  -      Chronic indwelling Fung catheter  Pt has failed multiple void trials in the past, now with chronic fung. Most recently replaced 9/29. C/f UTI  UA shows +1 blood, 3+ leuk esterase, >100 WBCs and few bacteria, patient reports episode of dysuria yesterday and suprapubic pain on palpation   - Ucx, NGTD          Sacral wound  Left buttocks wound cx: proteus positive, susceptibles pending    - MRI pelvis ordered, f/u results    Stage 3b chronic kidney disease  Creatine stable for now. BMP reviewed- noted Estimated Creatinine Clearance: 58 mL/min (based on SCr of 1.1 mg/dL). according to latest data. Based on current GFR, CKD stage is stage 3 - GFR 30-59.  Monitor UOP and serial BMP and adjust therapy as needed. Renally dose meds. Avoid nephrotoxic medications and procedures.    - hold nephrotoxic medications  - hold Lasix at this time   - renally dose Vanc    Paroxysmal atrial fibrillation  Patient with Paroxysmal (<7 days) atrial fibrillation which is uncontrolled. Patient is currently in atrial fibrillation.      echo on 7/2024 showed EF of 55-60% with no significant valvular abnormalities   - follows with Vascular Surgeon Dr. Banda  - hold home Lasix and HCTZ, not volume overloaded   - electrolytes stable, trend CMP  - hold home Eliquis due to unstable H&H, trend CBC  - monitor Afib, continuous telemetry   - plan to restart home Eliquis following MRI today, currently no further plans for surgical intervention    Wound of left leg  Left hip wound with purulent foul smelling drainage    - Wound cx ordered, f/u results  - On Vanc/Zosy  - Gen surg consulted for debridement ,completed  beside debridement of L hip and sacral wound on 10/1          Type 2 diabetes mellitus, with long-term current use of insulin   Hb A1c on 7/25 was 6  - Lantus 8 units BID, increased from 6 units BID due to elevated glucose on 10/4  - continue LDSSI   - POCT glucose checks before meals and nightly      Essential  hypertension  Chronic, uncontrolled. Latest blood pressure and vitals reviewed-     Temp:  [98.1 °F (36.7 °C)-98.8 °F (37.1 °C)]   Pulse:  [45-78]   Resp:  [16-18]   BP: (131-169)/(58-74)   SpO2:  [99 %-100 %] .   Home meds for hypertension were reviewed and noted below.   Hypertension Medications               furosemide (LASIX) 20 MG tablet Take 1 tablet (20 mg total) by mouth once daily.    NIFEdipine (PROCARDIA-XL) 90 MG (OSM) 24 hr tablet Take 1 tablet (90 mg total) by mouth once daily.            While in the hospital, will manage blood pressure as follows; Adjust home antihypertensive regimen as follows- home home Nifedipine    Will utilize p.r.n. blood pressure medication only if patient's blood pressure greater than 180/110 and he develops symptoms such as worsening chest pain or shortness of breath.     - hold diuretics, no signs of volume overload           VTE Risk Mitigation (From admission, onward)           Ordered     enoxaparin injection 40 mg  Every 24 hours         09/30/24 1935     Reason for No Pharmacological VTE Prophylaxis  Once        Question:  Reasons:  Answer:  Risk of Bleeding  Comment:  c/f bleeding with drop in Hgb    09/29/24 1914     IP VTE HIGH RISK PATIENT  Once         09/29/24 1914     Place sequential compression device  Until discontinued         09/29/24 1914                    Discharge Planning   OXANA: 10/7/2024     Code Status: Full Code   Is the patient medically ready for discharge?:     Reason for patient still in hospital (select all that apply): Treatment  Discharge Plan A: Long-term acute care facility (LTAC)   Discharge Delays: None known at this time              Anita Thomas MD  Department of Hospital Medicine   [facility] ([place]-[facility])

## 2024-10-04 NOTE — ASSESSMENT & PLAN NOTE
Pt has failed multiple void trials in the past, now with chronic fung. Most recently replaced 9/29. C/f UTI  UA shows +1 blood, 3+ leuk esterase, >100 WBCs and few bacteria, patient reports episode of dysuria yesterday and suprapubic pain on palpation   - Ucx, NGTD

## 2024-10-04 NOTE — SUBJECTIVE & OBJECTIVE
Interval History:   No acute events  Afebrile and white blood cell count normal  Blood cultures no growth to date  Wound culture Proteus mirabilis  Bone culture right heel in process  Sacral and pain slightly improved  MRI pelvis with BL ischial osteo  Denies fevers chills sweats    Review of Systems   Constitutional:  Negative for chills, diaphoresis and fever.   Respiratory:  Negative for shortness of breath.    Cardiovascular:  Negative for chest pain.   Gastrointestinal:  Negative for abdominal pain, diarrhea, nausea and vomiting.   Genitourinary:  Negative for dysuria and hematuria.   Skin:  Positive for wound.     Objective:     Vital Signs (Most Recent):  Temp: 98.1 °F (36.7 °C) (10/04/24 0722)  Pulse: 80 (10/04/24 1250)  Resp: 16 (10/04/24 1250)  BP: 139/65 (10/04/24 0722)  SpO2: 97 % (10/04/24 1250) Vital Signs (24h Range):  Temp:  [98.1 °F (36.7 °C)-98.6 °F (37 °C)] 98.1 °F (36.7 °C)  Pulse:  [45-80] 80  Resp:  [16-18] 16  SpO2:  [96 %-100 %] 97 %  BP: (139-143)/(58-65) 139/65     Weight: 83.4 kg (183 lb 13.8 oz)  Body mass index is 27.15 kg/m².    Estimated Creatinine Clearance: 58 mL/min (based on SCr of 1.1 mg/dL).     Physical Exam  Constitutional:       General: He is not in acute distress.     Appearance: He is well-developed. He is ill-appearing. He is not toxic-appearing or diaphoretic.       HENT:      Head: Normocephalic and atraumatic.   Cardiovascular:      Rate and Rhythm: Normal rate and regular rhythm.      Heart sounds: Normal heart sounds. No murmur heard.     No friction rub. No gallop.   Pulmonary:      Effort: Pulmonary effort is normal. No respiratory distress.      Breath sounds: Normal breath sounds. No wheezing or rales.   Abdominal:      General: Bowel sounds are normal. There is no distension.      Palpations: Abdomen is soft. There is no mass.      Tenderness: There is no abdominal tenderness. There is no guarding or rebound.   Skin:     General: Skin is warm and dry.    Neurological:      Mental Status: He is alert and oriented to person, place, and time.   Psychiatric:         Behavior: Behavior normal.                          Significant Labs: Blood Culture:   Recent Labs   Lab 06/28/24  2251 06/28/24  2252 07/25/24  2114 07/25/24  2115 09/29/24  1446   LABBLOO No growth after 5 days. No growth after 5 days. No growth after 5 days. No growth after 5 days. No Growth to date  No Growth to date  No Growth to date  No Growth to date  No Growth to date  No Growth to date  No Growth to date  No Growth to date  No Growth to date  No Growth to date     CBC:   Recent Labs   Lab 10/03/24  0359 10/03/24  1751 10/04/24  0308   WBC 10.55 17.11* 11.08   HGB 6.8* 8.2* 7.5*   HCT 22.3* 27.3* 24.1*   * 503* 470*     CMP:   Recent Labs   Lab 10/03/24  0359 10/04/24  0308    140   K 3.7 3.8    106   CO2 23 26   * 233*   BUN 18 16   CREATININE 0.9 1.1   CALCIUM 8.0* 8.2*   PROT 6.0 6.1   ALBUMIN 1.3* 1.4*   BILITOT 0.2 0.2   ALKPHOS 147* 129   AST 15 13   ALT 19 15   ANIONGAP 8 8     Urine Culture:   Recent Labs   Lab 05/08/24  2121 05/19/24  2102 07/25/24  1257 09/29/24  1722   LABURIN PROVIDENCIA STUARTII  >100,000 cfu/ml  * CANDIDA GLABRATA  10,000 - 49,999 cfu/ml  Treatment of asymptomatic candiduria is not recommended (except for   specific populations). Candida isolated in the urine typically   represents colonization. If an indwelling urinary catheter is present  it should be removed or replaced.  * No growth No growth     Urine Studies:   Recent Labs   Lab 08/05/24  1515 09/29/24  1722   COLORU Yellow Colorless*   APPEARANCEUA Hazy* Clear   PHUR 6.0 6.0   SPECGRAV 1.015 1.010   PROTEINUA 1+* Negative   GLUCUA Negative Negative   KETONESU Negative Negative   BILIRUBINUA Negative Negative   OCCULTUA 1+* 1+*   NITRITE Negative Negative   UROBILINOGEN Negative  --    LEUKOCYTESUR 3+* 3+*   RBCUA 25* 15*   WBCUA >100* >100*   BACTERIA Occasional Few*    SQUAMEPITHEL  --  1   HYALINECASTS 0  --      Wound Culture:   Recent Labs   Lab 07/01/24  1241 09/29/24  1628 10/03/24  1303   LABAERO METHICILLIN RESISTANT STAPHYLOCOCCUS AUREUS  Few  *  PSEUDOMONAS AERUGINOSA   Few  * PROTEUS MIRABILIS  Few  Skin bossman also present  * No growth     All pertinent labs within the past 24 hours have been reviewed.    Significant Imaging: I have reviewed all pertinent imaging results/findings within the past 24 hours.  MRI Midfoot WO Contrast RT [[id number]] (Abnormal) Resulted: 10/01/24 1210   Order Status: Completed Updated: 10/01/24 1213   Narrative:     EXAMINATION:  MRI FOREFOOT WO CONTRAST RT; MRI HINDFOOT WO CONTRAST RT; MRI MIDFOOT WO CONTRAST RT    CLINICAL HISTORY:  Osteomyelitis, foot;    TECHNIQUE:  MRI of the entire right foot with multiplanar and multisequence imaging.  No intravenous contrast was administered.    COMPARISON:  Right foot radiograph 09/30/2024, right foot CT 06/28/2024, and right foot MRI 05/16/2023.    FINDINGS:  Exam limited by motion artifact.    BONES: Confluent marrow edema throughout the posterior 2/3 of the calcaneus with corresponding T1 medullary hypointensity and suspected posterior cortical irregularity.  Comfort marrow edema throughout the lateral half of the lateral malleolus with grossly preserved cortical margins and T1 medullary signal.  Confluent marrow edema throughout the 1st distal phalanx with preserved T1 medullary signal and cortical margins.  Contour irregularity of the 5th metatarsal shaft, possibly sequela of a healed fracture or remote infection.  No acute fractures.  No avascular necrosis.    JOINTS: Scattered hindfoot, midfoot and forefoot osteoarthritis.  No subluxation or dislocation.  No joint effusions.    TENDONS: Full-thickness, partial width tearing of the insertional fibers of the Achilles tendon.  Posterior tibial, flexor digitorum longus, flexor hallucis longus, peroneus longus, peroneus brevis and extensor  tendons appear grossly intact.    MUSCLES: Severe fatty degeneration and patchy edema of the visualized musculature, likely sequela of chronic denervation:    MISCELLANEOUS: Large soft tissue ulcer overlying the posterior and lateral aspects of the calcaneus.  Soft tissue ulceration overlying the lateral malleolus and 1st distal phalanx.  Generalized subcutaneous edema most pronounced along the dorsum of the foot.  No fluid collection.  Chronic thickening of the plantar aponeurosis.   Impression:       1. Acute osteomyelitis of the posterior 2/3 of the calcaneus with a large overlying soft tissue ulcer that involves the distal insertional fibers of the Achilles tendon.  2. Early osteomyelitis of the lateral malleolus and 1st distal phalanx with overlying soft tissue ulcers.  3. Additional findings, as above.  This report was flagged in Epic as abnormal.    Electronically signed by resident: Raegan Melvin  Date: 10/01/2024  Time: 11:26    Electronically signed by: Erik Hart MD  Date: 10/01/2024  Time: 12:10   MRI Hindfoot WO Contrast RT [[id number]] (Abnormal) Resulted: 10/01/24 1210   Order Status: Completed Updated: 10/01/24 1213   Narrative:     EXAMINATION:  MRI FOREFOOT WO CONTRAST RT; MRI HINDFOOT WO CONTRAST RT; MRI MIDFOOT WO CONTRAST RT    CLINICAL HISTORY:  Osteomyelitis, foot;    TECHNIQUE:  MRI of the entire right foot with multiplanar and multisequence imaging.  No intravenous contrast was administered.    COMPARISON:  Right foot radiograph 09/30/2024, right foot CT 06/28/2024, and right foot MRI 05/16/2023.    FINDINGS:  Exam limited by motion artifact.    BONES: Confluent marrow edema throughout the posterior 2/3 of the calcaneus with corresponding T1 medullary hypointensity and suspected posterior cortical irregularity.  Comfort marrow edema throughout the lateral half of the lateral malleolus with grossly preserved cortical margins and T1 medullary signal.  Confluent marrow edema throughout the  1st distal phalanx with preserved T1 medullary signal and cortical margins.  Contour irregularity of the 5th metatarsal shaft, possibly sequela of a healed fracture or remote infection.  No acute fractures.  No avascular necrosis.    JOINTS: Scattered hindfoot, midfoot and forefoot osteoarthritis.  No subluxation or dislocation.  No joint effusions.    TENDONS: Full-thickness, partial width tearing of the insertional fibers of the Achilles tendon.  Posterior tibial, flexor digitorum longus, flexor hallucis longus, peroneus longus, peroneus brevis and extensor tendons appear grossly intact.    MUSCLES: Severe fatty degeneration and patchy edema of the visualized musculature, likely sequela of chronic denervation:    MISCELLANEOUS: Large soft tissue ulcer overlying the posterior and lateral aspects of the calcaneus.  Soft tissue ulceration overlying the lateral malleolus and 1st distal phalanx.  Generalized subcutaneous edema most pronounced along the dorsum of the foot.  No fluid collection.  Chronic thickening of the plantar aponeurosis.   Impression:       1. Acute osteomyelitis of the posterior 2/3 of the calcaneus with a large overlying soft tissue ulcer that involves the distal insertional fibers of the Achilles tendon.  2. Early osteomyelitis of the lateral malleolus and 1st distal phalanx with overlying soft tissue ulcers.  3. Additional findings, as above.  This report was flagged in Epic as abnormal.    Electronically signed by resident: Raegan Melvin  Date: 10/01/2024  Time: 11:26    Electronically signed by: Erik Hart MD  Date: 10/01/2024  Time: 12:10   MRI Forefoot WO Contrast RT [[id number]] (Abnormal) Resulted: 10/01/24 1210   Order Status: Completed Updated: 10/01/24 1213   Narrative:     EXAMINATION:  MRI FOREFOOT WO CONTRAST RT; MRI HINDFOOT WO CONTRAST RT; MRI MIDFOOT WO CONTRAST RT    CLINICAL HISTORY:  Osteomyelitis, foot;    TECHNIQUE:  MRI of the entire right foot with multiplanar and  multisequence imaging.  No intravenous contrast was administered.    COMPARISON:  Right foot radiograph 09/30/2024, right foot CT 06/28/2024, and right foot MRI 05/16/2023.    FINDINGS:  Exam limited by motion artifact.    BONES: Confluent marrow edema throughout the posterior 2/3 of the calcaneus with corresponding T1 medullary hypointensity and suspected posterior cortical irregularity.  Comfort marrow edema throughout the lateral half of the lateral malleolus with grossly preserved cortical margins and T1 medullary signal.  Confluent marrow edema throughout the 1st distal phalanx with preserved T1 medullary signal and cortical margins.  Contour irregularity of the 5th metatarsal shaft, possibly sequela of a healed fracture or remote infection.  No acute fractures.  No avascular necrosis.    JOINTS: Scattered hindfoot, midfoot and forefoot osteoarthritis.  No subluxation or dislocation.  No joint effusions.    TENDONS: Full-thickness, partial width tearing of the insertional fibers of the Achilles tendon.  Posterior tibial, flexor digitorum longus, flexor hallucis longus, peroneus longus, peroneus brevis and extensor tendons appear grossly intact.    MUSCLES: Severe fatty degeneration and patchy edema of the visualized musculature, likely sequela of chronic denervation:    MISCELLANEOUS: Large soft tissue ulcer overlying the posterior and lateral aspects of the calcaneus.  Soft tissue ulceration overlying the lateral malleolus and 1st distal phalanx.  Generalized subcutaneous edema most pronounced along the dorsum of the foot.  No fluid collection.  Chronic thickening of the plantar aponeurosis.   Impression:       1. Acute osteomyelitis of the posterior 2/3 of the calcaneus with a large overlying soft tissue ulcer that involves the distal insertional fibers of the Achilles tendon.  2. Early osteomyelitis of the lateral malleolus and 1st distal phalanx with overlying soft tissue ulcers.  3. Additional findings, as  above.  This report was flagged in Epic as abnormal.    Electronically signed by resident: Raegan Melvin  Date: 10/01/2024  Time: 11:26    Electronically signed by: Erik Hart MD  Date: 10/01/2024  Time: 12:10   US Lower Extremity Arteries Bilateral [[id number]] Resulted: 09/30/24 1712   Order Status: Completed Updated: 09/30/24 1715   Narrative:     EXAMINATION:  US LOWER EXTREMITY ARTERIES BILATERAL    CLINICAL HISTORY:  wound healing;    TECHNIQUE:  Bilateral spectral, color and grayscale images of the large arteries of both lower extremities were performed.    COMPARISON:  Multiple ultrasounds, most recent 07/26/2024    FINDINGS:  Right lower extremity: Peak systolic velocity in the right lower extremity arterial system measures 99 cm/sec in the anterior tibial artery.  No evidence of a hemodynamically significant stenosis.  There are triphasic waveforms throughout most of the right lower extremity.  There are monophasic waveforms in the posterior tibial artery.    Left lower extremity: History of above the knee amputation.  Peak systolic velocity in the left lower extremity arterial system measures 80 cm/sec in the deep femoral artery.  No evidence of a hemodynamically significant stenosis.  There are triphasic and biphasic waveforms throughout most of the left lower extremity.  There are monophasic waveforms in the distal femoral artery.    Miscellaneous: Arrhythmia.  Prominent right inguinal lymph node measuring 0.9 cm short axis, presumably reactive.   Impression:       Left above the knee amputation.    No hemodynamically significant arterial stenosis in either lower extremity.    Arrhythmia.  Correlate with EKG.      Electronically signed by: Rishi Ramos  Date: 09/30/2024  Time: 17:12   X-Ray Foot Complete Right [[id number]] (Abnormal) Resulted: 09/30/24 1426   Order Status: Completed Updated: 09/30/24 1429   Narrative:     EXAMINATION:  XR FOOT COMPLETE 3 VIEW RIGHT    CLINICAL HISTORY:  R heel  wound;.    TECHNIQUE:  AP, lateral, and oblique views of the right foot were performed.    COMPARISON:  06/28/2024    FINDINGS:  Three views right foot.    There is osteopenia.  There is remote injury of the 5th metatarsal.  There is motion blurring.  There is questionable periosteal reaction about the distal aspect of the 5th metatarsal versus motion blurring.  There is edema about the foot.  There are degenerative changes of the calcaneus as well as of the dorsal foot.  There is vascular calcification.  There is skin wound overlying the calcaneus.   Impression:       This report was flagged in Epic as abnormal.    1. Questionable indistinctness about the distal aspect of the 5th metatarsal, may reflect subacute injury to the region however infectious process not excluded.  Please note, evaluation is limited given motion blurring in the region.  Correlation is advised.  2. Diffuse edema about the foot noting skin wound at the level of the calcaneus.  Correlation is needed to exclude exposed bone.  3. There appears to be increased erosive change of the posterior aspect of the calcaneus since the previous examination.  Underlying or chronic osteomyelitis not excluded.      Electronically signed by: Milton Dove MD  Date: 09/30/2024  Time: 14:26   X-Ray Chest AP Portable [[id number]] Resulted: 09/29/24 1755   Order Status: Completed Updated: 09/29/24 1801   Narrative:     EXAMINATION:  XR CHEST AP PORTABLE    CLINICAL HISTORY:  wound infection;    TECHNIQUE:  Single frontal view of the chest was performed.    COMPARISON:  07/25/2024    FINDINGS:  The cardiomediastinal silhouette is not enlarged.  There is elevation of the right hemidiaphragm..  There is no pleural effusion.  The trachea is midline.  The lungs are symmetrically expanded bilaterally with coarse interstitial attenuation.  No large focal consolidation seen.  There is no pneumothorax.  The osseous structures are remarkable for degenerative change..    Impression:       Course interstitial attenuation, similar to the prior exam.  Superimposed edema is a consideration.      Electronically signed by: Milton Dove MD  Date: 09/29/2024  Time: 17:59      Imaging History     2024    Date Procedure Name Study Review Link PACS Link Status Accession Number Location   10/03/24 10:22 AM Cardiac monitoring strips Study Review  Final     10/02/24 07:55 PM Cardiac monitoring strips Study Review  Final     10/02/24 03:09 PM Cardiac monitoring strips Study Review  Final     10/01/24 10:22 PM Cardiac monitoring strips Study Review  Final     10/01/24 11:23 AM MRI Hindfoot WO Contrast RT Study Review  Images Final [id number] [facility]   10/01/24 11:23 AM MRI Midfoot WO Contrast RT Study Review  Images Final [id number] [facility]   10/01/24 11:23 AM MRI Forefoot WO Contrast RT Study Review  Images Final [id number] [facility]   10/01/24 09:44 AM Cardiac monitoring strips Study Review  Final     09/30/24 11:22 PM Cardiac monitoring strips Study Review  Final     09/30/24 05:36 PM Cardiac monitoring strips Study Review  Final     09/30/24 04:54 PM US Lower Extremity Arteries Bilateral Study Review  Images Final [id number] [facility]   09/30/24 01:10 PM X-Ray Foot Complete Right Study Review  Images Final [id number] [facility]   09/29/24 10:35 PM Cardiac monitoring strips Study Review  Final     09/29/24 05:10 PM X-Ray Chest AP Portable Study Review  Images Final [id number] [facility]

## 2024-10-04 NOTE — ASSESSMENT & PLAN NOTE
Chronic ulceration of R posterior heel with mixture of granular and necrotic tissue WBC elevated. VSS, afebrile.  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 MRI shows OM to posterior calcaneus, lateral malleolus, and distal phalanx.  LE US arterial shows no stenosis.  IDSA mild/moderate foot wound infection.  S/p R heel bone biopsy and debridement 10/3.    Plan:  -Physical exam and imaging findings discussed with the patient.  Discussed with the patient that he has om 2 R calcaneus.  No stenosis to RLE.  -Recommend continuing with extensive wound care and abx  -Abx per Primary/ID  -R heel dressed with Hydrofera blue, 2 abd pads, Kerlix, and ACE  -Wound care orders updated  -Podiatry will continue to follow up    Future Discharge Recommendations  -Patient to follow up in outpatient Podiatry clinic. Podiatry will schedule  -Antibiotics per Primary/ID  -HH/SNF to do dressings 2-3 times a week as follows:  Rinse R heel wound with Vashe and pat dry.  Dress wound with Hydrofera blue, Kerlix, and ACE wrap.  -Keep dressings clean, dry, and intact until follow-up appointment

## 2024-10-04 NOTE — ASSESSMENT & PLAN NOTE
Chronic, uncontrolled. Latest blood pressure and vitals reviewed-     Temp:  [98.1 °F (36.7 °C)-98.8 °F (37.1 °C)]   Pulse:  [45-78]   Resp:  [16-18]   BP: (131-169)/(58-74)   SpO2:  [99 %-100 %] .   Home meds for hypertension were reviewed and noted below.   Hypertension Medications               furosemide (LASIX) 20 MG tablet Take 1 tablet (20 mg total) by mouth once daily.    NIFEdipine (PROCARDIA-XL) 90 MG (OSM) 24 hr tablet Take 1 tablet (90 mg total) by mouth once daily.            While in the hospital, will manage blood pressure as follows; Adjust home antihypertensive regimen as follows- home home Nifedipine    Will utilize p.r.n. blood pressure medication only if patient's blood pressure greater than 180/110 and he develops symptoms such as worsening chest pain or shortness of breath.     - hold diuretics, no signs of volume overload

## 2024-10-04 NOTE — ASSESSMENT & PLAN NOTE
Creatine stable for now. BMP reviewed- noted Estimated Creatinine Clearance: 58 mL/min (based on SCr of 1.1 mg/dL). according to latest data. Based on current GFR, CKD stage is stage 3 - GFR 30-59.  Monitor UOP and serial BMP and adjust therapy as needed. Renally dose meds. Avoid nephrotoxic medications and procedures.    - hold nephrotoxic medications  - hold Lasix at this time   - renally dose Vanc

## 2024-10-04 NOTE — PROGRESS NOTES
[facility] ([place] [address])  Podiatry  Progress Note    Patient Name: Saji Castañeda  MRN: [id number]  Admission Date: 9/29/2024  Hospital Length of Stay: 4 days  Attending Physician: Jm Rosa MD  Primary Care Provider: Vinod Elise MD     Subjective:     Interval History:  VSS, afebrile, WBC WNL.  Patient lying comfortably in bed.  Patient is seen with together with  provider this morning.  Dressing clean, dry, intact.  Denies any pain to his R foot.  Denies any new pedal complaints    Scheduled Meds:   ascorbic acid (vitamin C)  500 mg Oral BID    DAPTOmycin (CUBICIN) IV (PEDS and ADULTS)  8 mg/kg Intravenous Q24H    enoxparin  40 mg Subcutaneous Q24H (prophylaxis, 1700)    ergocalciferol  50,000 Units Oral Every Fri    furosemide  20 mg Oral Daily    gabapentin  300 mg Oral BID    insulin glargine U-100  8 Units Subcutaneous BID    multivitamin  1 tablet Oral Daily    piperacillin-tazobactam (Zosyn) IV (PEDS and ADULTS) (extended infusion is not appropriate)  4.5 g Intravenous Q8H    polyethylene glycol  17 g Oral Daily    senna  8.6 mg Oral Daily    tamsulosin  0.4 mg Oral Daily    zinc sulfate  220 mg Oral Daily     Continuous Infusions:  PRN Meds:  Current Facility-Administered Medications:     acetaminophen, 650 mg, Oral, Q6H PRN    dextrose 10%, 12.5 g, Intravenous, PRN    dextrose 10%, 25 g, Intravenous, PRN    glucagon (human recombinant), 1 mg, Intramuscular, PRN    glucose, 16 g, Oral, PRN    glucose, 24 g, Oral, PRN    HYDROmorphone, 0.2 mg, Intravenous, Q6H PRN    insulin aspart U-100, 0-5 Units, Subcutaneous, QID (AC + HS) PRN    naloxone, 0.02 mg, Intravenous, PRN    oxyCODONE, 10 mg, Oral, Q6H PRN    oxyCODONE, 5 mg, Oral, Q6H PRN    sodium chloride 0.9%, 10 mL, Intravenous, Q12H PRN    Review of Systems   Constitutional:  Negative for fatigue and fever.   HENT: Negative.     Eyes: Negative.    Respiratory: Negative.     Cardiovascular: Negative.    Gastrointestinal: Negative.     Endocrine: Negative.    Genitourinary: Negative.    Musculoskeletal:  Positive for gait problem.   Skin:  Positive for color change and wound.        Reports wound to her R heel   Allergic/Immunologic: Negative.    Hematological: Negative.    Psychiatric/Behavioral: Negative.     Objective:     Vital Signs (Most Recent):  Temp: 98.1 °F (36.7 °C) (10/04/24 0722)  Pulse: 80 (10/04/24 1250)  Resp: 16 (10/04/24 1250)  BP: 139/65 (10/04/24 0722)  SpO2: 97 % (10/04/24 1250) Vital Signs (24h Range):  Temp:  [98.1 °F (36.7 °C)-98.6 °F (37 °C)] 98.1 °F (36.7 °C)  Pulse:  [45-80] 80  Resp:  [16-18] 16  SpO2:  [96 %-100 %] 97 %  BP: (139-143)/(58-65) 139/65     Weight: 83.4 kg (183 lb 13.8 oz)  Body mass index is 27.15 kg/m².    Foot Exam     General  Affect: appropriate   Gait: (non ambulatory)     Right Foot/Ankle   Inspection and Palpation  Ecchymosis: none  Tenderness: none   Swelling: none   Skin Exam: dry skin, skin changes, ulcer (posterior heel) and erythema; skin not intact      Neurovascular  Dorsalis pedis: doppler  Posterior tibial: doppler  Saphenous nerve sensation: absent  Tibial nerve sensation: absent  Superficial peroneal nerve sensation: absent  Deep peroneal nerve sensation: absent  Sural nerve sensation: absent     Comments  Large Right posterior heel wound with osseous granular base, natalie wound dry eschar, malodor. Without drainage, purulence, bleeding. Probes to bone. Diffusely xerotic.    10/4:  Wound base with granular tissue noted centrally with necrotic periwound.  No purulent drainage noted.  No other no active drainage noted.     Left Foot/Ankle    Comments  S/p AKA    Laboratory:  BMP:   Recent Labs   Lab 10/04/24  0308   *      K 3.8      CO2 26   BUN 16   CREATININE 1.1   CALCIUM 8.2*   MG 1.8     CBC:   Recent Labs   Lab 10/04/24  0308   WBC 11.08   RBC 2.90*   HGB 7.5*   HCT 24.1*   *   MCV 83   MCH 25.9*   MCHC 31.1*     Diagnostic Results:  I have reviewed all  pertinent imaging results/findings within the past 24 hours.    Clinical Findings:    Assessment/Plan:     ID  * Osteomyelitis  Chronic ulceration of R posterior heel with mixture of granular and necrotic tissue WBC elevated. VSS, afebrile.  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 MRI shows OM to posterior calcaneus, lateral malleolus, and distal phalanx.  LE US arterial shows no stenosis.  IDSA mild/moderate foot wound infection.  S/p R heel bone biopsy and debridement 10/3.    Plan:  -Physical exam and imaging findings discussed with the patient.  Discussed with the patient that he has om 2 R calcaneus.  No stenosis to RLE.  -Recommend continuing with extensive wound care and abx  -Abx per Primary/ID  -R heel dressed with Hydrofera blue, 2 abd pads, Kerlix, and ACE  -Wound care orders updated  -Podiatry will continue to follow up    Future Discharge Recommendations  -Patient to follow up in outpatient Podiatry clinic. Podiatry will schedule  -Antibiotics per Primary/ID  -HH/SNF to do dressings 2-3 times a week as follows:  Rinse R heel wound with Vashe and pat dry.  Dress wound with Hydrofera blue, Kerlix, and ACE wrap.  -Keep dressings clean, dry, and intact until follow-up appointment          Tyra Aguero DPM  Podiatry  [facility] ([place])

## 2024-10-04 NOTE — PLAN OF CARE
Discharge Plan A and Plan B have been determined by review of patient's clinical status, future medical and therapeutic needs, and coverage/benefits for post-acute care in coordination with multidisciplinary team members.      10/04/24 1246   Post-Acute Status   Post-Acute Authorization Placement   Post-Acute Placement Status Referrals Sent   Hospital Resources/Appts/Education Provided Appointments scheduled and added to AVS   Patient choice form signed by patient/caregiver List from CMS Compare;List with quality metrics by geographic area provided   Discharge Delays None known at this time   Discharge Plan   Discharge Plan A Skilled Nursing Facility     CM spoke  with patients sisterJanuary #  [phone] (Mobile)  and updated that   [employer] MANAGED MEDICARE - [employer] HMO PPO SPECIAL NEEDS - CAPITATED upheld LTAC denial and patient is SNF appropriate.    Patients plan is to  return to [facility] with skilled needs.     OXANA: 10/7/24    1115 am  CM spoke with Bryce at [facility] and updated on patients discharge and CM sent updates to [facility]   via [facility].  CM confirmed that patient will need an outpatient referral for wound management              TREATMENT PLANS:     Osteomyelitis  R heal OM diagnosed during admission 6/26-7/11 with bone cx positive for pseudomonas and MRSA. Discharged on vanc and cefepime for 6 week course.  Recent admission 8/2-8/6, at that time Cefepime discontinued due to c/f Cefepime neurotoxicity, then developed Vanc YNES, and discharged on Daptomycin and Zosyn  - extensive grade 3-4 sacral wounds, L hip wound purulence  - chronic osteomyelitis of R heel with nonhealing ulcer    - Wound Cx from L buttocks, positive for proteus susceptibilities pending  - Blood Cx NGTD  - started on IV Vanc in the ED, recommend renally dosing  - continue Vanc 1750 mg q12h   - continue Zosyn  - Wound care consulted, recommended podiatry consult, providing   - WBC elevated 13.6, likely due to  wound infection  - trend CBC and CMP  - procalcitonin 0.12  - continue oxycodone and tylenol PRN for pain  - per pharmacy, obtain vanc trough on 10/01 at 6:00 before giving 4th dose, range 10-20 mcg/mL  - podiatry consulted, performed bone biopsy and debridement of the R heel on 10/3. Gram stain with no wbcs and no organisms seen. Cx in process.   - MRI R foot ordered , c/f acute osteomyelitis of 2/3 calcaneus and early osteomyelitis of lateral malleolus & 1st distal phalanx  - ID consulted, IV Dapto started 10/2/24  - MRI pelvis ordered, f/u results    Follow up: facility PCP      Referrals:  Ambulatory Outpatient wound care clinic     Keerthi Alex RN  Case Management  [facility]  [phone]

## 2024-10-04 NOTE — ASSESSMENT & PLAN NOTE
Patient with Paroxysmal (<7 days) atrial fibrillation which is uncontrolled. Patient is currently in atrial fibrillation.      echo on 7/2024 showed EF of 55-60% with no significant valvular abnormalities   - follows with Vascular Surgeon Dr. Banda  - hold home Lasix and HCTZ, not volume overloaded   - electrolytes stable, trend CMP  - hold home Eliquis due to unstable H&H, trend CBC  - monitor Afib, continuous telemetry   - plan to restart home Eliquis following MRI today, currently no further plans for surgical intervention

## 2024-10-04 NOTE — ASSESSMENT & PLAN NOTE
Hb A1c on 7/25 was 6  - Lantus 8 units BID, increased from 6 units BID due to elevated glucose on 10/4  - continue LDSSI   - POCT glucose checks before meals and nightly

## 2024-10-04 NOTE — PROGRESS NOTES
Received a call/message from Taj in the MRI Department in relation to this patient needing to be scheduled for a MRI and has a Medtronic ICD/Pacemaker.  Patient's Device is MRI compatible/conditional.  To meet protocol the Pacemaker/ICD must have been implanted no less than 6 weeks prior to scheduled date of Scan.  ICD/PPM and leads must be from same .  MRI informed ordering MD must input a Cardiac Device Check in clinic and hospital order, patient must have an xray within 6 months or less prior to MRI reprogramming.  Chest xray to be reviewed by Radiologist.         MRI has been added on for today.  Device rep has agreed to be available for the MRI.      Patient has a Medtronic Micra AV

## 2024-10-04 NOTE — SUBJECTIVE & OBJECTIVE
Interval History: NAEON. Afebrile, hypertensive /74 overnight. Glucose 233, increased lantus to 8 units BID. Patient reports sacral wound pain 8/10, a little better than yesterday. He says he is eating and drinking well. No new concerns.     Review of Systems   Constitutional:  Negative for appetite change.   Respiratory:  Negative for shortness of breath.    Cardiovascular:  Negative for chest pain.   Gastrointestinal:  Positive for abdominal pain. Negative for abdominal distention, diarrhea, nausea and vomiting.   Skin:  Positive for wound (painful sacral wound).     Objective:     Vital Signs (Most Recent):  Temp: 98.1 °F (36.7 °C) (10/04/24 0722)  Pulse: 78 (10/04/24 0722)  Resp: 17 (10/04/24 0722)  BP: 139/65 (10/04/24 0722)  SpO2: 100 % (10/04/24 0722) Vital Signs (24h Range):  Temp:  [98.1 °F (36.7 °C)-98.8 °F (37.1 °C)] 98.1 °F (36.7 °C)  Pulse:  [45-78] 78  Resp:  [16-18] 17  SpO2:  [99 %-100 %] 100 %  BP: (131-169)/(58-74) 139/65     Weight: 83.4 kg (183 lb 13.8 oz)  Body mass index is 27.15 kg/m².    Intake/Output Summary (Last 24 hours) at 10/4/2024 0857  Last data filed at 10/4/2024 0520  Gross per 24 hour   Intake 1053.52 ml   Output 1000 ml   Net 53.52 ml         Physical Exam  Constitutional:       General: He is not in acute distress.     Appearance: He is ill-appearing. He is not diaphoretic.   HENT:      Head: Normocephalic and atraumatic.      Right Ear: External ear normal.      Left Ear: External ear normal.      Mouth/Throat:      Mouth: Mucous membranes are dry.   Eyes:      General:         Right eye: No discharge.         Left eye: No discharge.      Extraocular Movements: Extraocular movements intact.   Cardiovascular:      Rate and Rhythm: Normal rate. Rhythm irregular.      Heart sounds: No murmur heard.  Pulmonary:      Effort: Pulmonary effort is normal. No respiratory distress.      Breath sounds: No wheezing.   Abdominal:      General: Abdomen is flat. There is no distension.       Tenderness: Tenderness: RLQ tenderness.   Musculoskeletal:      Cervical back: Normal range of motion.      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 Surgical scar well-healed  Right lower extremity with cracked, dry skin as well as right heel wound  R heel dressing in tact      Skin:     General: Skin is dry.   Neurological:      General: No focal deficit present.      Mental Status: He is alert.             Significant Labs: All pertinent labs within the past 24 hours have been reviewed.    Significant Imaging: I have reviewed all pertinent imaging results/findings within the past 24 hours.

## 2024-10-04 NOTE — NURSING
Patient arrived by stretcher by transport.  Patient having MRI.  Patient has a med-[employer]  pacemaker.  Pacemaker rep at bedside to program pacemaker for MRI.  Pacemaker settings is 80 beats per minute.  Patient placed on cardiac and pulse ox monitoring.  Preparing to start MRI.

## 2024-10-04 NOTE — SUBJECTIVE & OBJECTIVE
Subjective:     Interval History:  VSS, afebrile, WBC WNL.  Patient lying comfortably in bed.  Patient is seen with together with HM provider this morning.  Dressing clean, dry, intact.  Denies any pain to his R foot.  Denies any new pedal complaints    Scheduled Meds:   ascorbic acid (vitamin C)  500 mg Oral BID    DAPTOmycin (CUBICIN) IV (PEDS and ADULTS)  8 mg/kg Intravenous Q24H    enoxparin  40 mg Subcutaneous Q24H (prophylaxis, 1700)    ergocalciferol  50,000 Units Oral Every Fri    furosemide  20 mg Oral Daily    gabapentin  300 mg Oral BID    insulin glargine U-100  8 Units Subcutaneous BID    multivitamin  1 tablet Oral Daily    piperacillin-tazobactam (Zosyn) IV (PEDS and ADULTS) (extended infusion is not appropriate)  4.5 g Intravenous Q8H    polyethylene glycol  17 g Oral Daily    senna  8.6 mg Oral Daily    tamsulosin  0.4 mg Oral Daily    zinc sulfate  220 mg Oral Daily     Continuous Infusions:  PRN Meds:  Current Facility-Administered Medications:     acetaminophen, 650 mg, Oral, Q6H PRN    dextrose 10%, 12.5 g, Intravenous, PRN    dextrose 10%, 25 g, Intravenous, PRN    glucagon (human recombinant), 1 mg, Intramuscular, PRN    glucose, 16 g, Oral, PRN    glucose, 24 g, Oral, PRN    HYDROmorphone, 0.2 mg, Intravenous, Q6H PRN    insulin aspart U-100, 0-5 Units, Subcutaneous, QID (AC + HS) PRN    naloxone, 0.02 mg, Intravenous, PRN    oxyCODONE, 10 mg, Oral, Q6H PRN    oxyCODONE, 5 mg, Oral, Q6H PRN    sodium chloride 0.9%, 10 mL, Intravenous, Q12H PRN    Review of Systems   Constitutional:  Negative for fatigue and fever.   HENT: Negative.     Eyes: Negative.    Respiratory: Negative.     Cardiovascular: Negative.    Gastrointestinal: Negative.    Endocrine: Negative.    Genitourinary: Negative.    Musculoskeletal:  Positive for gait problem.   Skin:  Positive for color change and wound.        Reports wound to her R heel   Allergic/Immunologic: Negative.    Hematological: Negative.     Psychiatric/Behavioral: Negative.     Objective:     Vital Signs (Most Recent):  Temp: 98.1 °F (36.7 °C) (10/04/24 0722)  Pulse: 80 (10/04/24 1250)  Resp: 16 (10/04/24 1250)  BP: 139/65 (10/04/24 0722)  SpO2: 97 % (10/04/24 1250) Vital Signs (24h Range):  Temp:  [98.1 °F (36.7 °C)-98.6 °F (37 °C)] 98.1 °F (36.7 °C)  Pulse:  [45-80] 80  Resp:  [16-18] 16  SpO2:  [96 %-100 %] 97 %  BP: (139-143)/(58-65) 139/65     Weight: 83.4 kg (183 lb 13.8 oz)  Body mass index is 27.15 kg/m².    Foot Exam     General  Affect: appropriate   Gait: (non ambulatory)     Right Foot/Ankle   Inspection and Palpation  Ecchymosis: none  Tenderness: none   Swelling: none   Skin Exam: dry skin, skin changes, ulcer (posterior heel) and erythema; skin not intact      Neurovascular  Dorsalis pedis: doppler  Posterior tibial: doppler  Saphenous nerve sensation: absent  Tibial nerve sensation: absent  Superficial peroneal nerve sensation: absent  Deep peroneal nerve sensation: absent  Sural nerve sensation: absent     Comments  Large Right posterior heel wound with osseous granular base, natalie wound dry eschar, malodor. Without drainage, purulence, bleeding. Probes to bone. Diffusely xerotic.    10/4:  Wound base with granular tissue noted centrally with necrotic periwound.  No purulent drainage noted.  No other no active drainage noted.     Left Foot/Ankle    Comments  S/p AKA    Laboratory:  BMP:   Recent Labs   Lab 10/04/24  0308   *      K 3.8      CO2 26   BUN 16   CREATININE 1.1   CALCIUM 8.2*   MG 1.8     CBC:   Recent Labs   Lab 10/04/24  0308   WBC 11.08   RBC 2.90*   HGB 7.5*   HCT 24.1*   *   MCV 83   MCH 25.9*   MCHC 31.1*     Diagnostic Results:  I have reviewed all pertinent imaging results/findings within the past 24 hours.    Clinical Findings:

## 2024-10-04 NOTE — ASSESSMENT & PLAN NOTE
R heal OM diagnosed during admission 6/26-7/11 with bone cx positive for pseudomonas and MRSA. Discharged on vanc and cefepime for 6 week course.  Recent admission 8/2-8/6, at that time Cefepime discontinued due to c/f Cefepime neurotoxicity, then developed Vanc YNES, and discharged on Daptomycin and Zosyn  - extensive grade 3-4 sacral wounds, L hip wound purulence  - chronic osteomyelitis of R heel with nonhealing ulcer    - Wound Cx from L buttocks, positive for proteus susceptibilities pending  - Blood Cx NGTD  - started on IV Vanc in the ED, recommend renally dosing  - continue Vanc 1750 mg q12h   - continue Zosyn  - Wound care consulted, recommended podiatry consult, providing   - WBC elevated 13.6, likely due to wound infection  - trend CBC and CMP  - procalcitonin 0.12  - continue oxycodone and tylenol PRN for pain  - per pharmacy, obtain vanc trough on 10/01 at 6:00 before giving 4th dose, range 10-20 mcg/mL  - podiatry consulted, performed bone biopsy and debridement of the R heel on 10/3. Gram stain with no wbcs and no organisms seen. Cx in process.   - MRI R foot ordered , c/f acute osteomyelitis of 2/3 calcaneus and early osteomyelitis of lateral malleolus & 1st distal phalanx  - ID consulted, IV Dapto started 10/2/24  - MRI pelvis ordered, f/u results

## 2024-10-04 NOTE — PROGRESS NOTES
An MRI has been ordered on this patient with a Medtronic implantable cardiac device.    The patient's medical record has been reviewed for the following:    The SureScan system is implanted in the left or right pectoral region    The SureScan device is operating within the projected service life    The MRI conditional system has been implanted for a minimum of 6 weeks and is considered post the lead maturation period    The patient has no implanted lead extenders, lead adaptors, or abandoned leads    There are no known broken leads or leads with intermittent electrical contact as confirmed by the lead impedance history    Pacing capture thresholds are < 2.0 volts (V) at a pulse width of 0.40 milliseconds (ms)    For pacemakers, the measured pacing lead impedances are >/= 200 Ohms and </= 1500 Ohms    For ICD's, the measured pacing lead impedance value of >/= 200 Ohms and </= 3,000 Ohms; ICD shock lead impedances between 20 and 200 Ohms    There is no noted diaphragmatic stimulation at a pacing output of 5.0V and at a pulse width of 1.0 ms in patients whose device will be programmed to an asynchronous pacing mode    Parameters should be reprogrammed with MRI SureScan mode programmed to ON as per the manufacturers scanning guidelines.    Pacing parameters should be programmed as such:     VOO: program base rate + 10 bpm above the patients intrinsic ventricular or +10 bpm above programmed base rate if ventricular pacing is noted

## 2024-10-04 NOTE — NURSING
Patient resting in bed ,Aox4, does complain of some pain and requests medication. Plan of care to monitor pain and skin discussed with patient. Call light within reach, will continue to monitor.

## 2024-10-04 NOTE — ASSESSMENT & PLAN NOTE
Left buttocks wound cx: proteus positive, susceptibles pending    - MRI pelvis ordered, f/u results

## 2024-10-04 NOTE — ASSESSMENT & PLAN NOTE
Left hip wound with purulent foul smelling drainage    - Wound cx ordered, f/u results  - On Vanc/Zosy  - Gen surg consulted for debridement ,completed  beside debridement of L hip and sacral wound on 10/1

## 2024-10-05 LAB
ALBUMIN SERPL BCP-MCNC: 1.4 G/DL (ref 3.5–5.2)
ALP SERPL-CCNC: 118 U/L (ref 55–135)
ALT SERPL W/O P-5'-P-CCNC: 16 U/L (ref 10–44)
ANION GAP SERPL CALC-SCNC: 10 MMOL/L (ref 8–16)
AST SERPL-CCNC: 15 U/L (ref 10–40)
BASOPHILS # BLD AUTO: 0.03 K/UL (ref 0–0.2)
BASOPHILS NFR BLD: 0.3 % (ref 0–1.9)
BILIRUB SERPL-MCNC: 0.2 MG/DL (ref 0.1–1)
BUN SERPL-MCNC: 14 MG/DL (ref 8–23)
CALCIUM SERPL-MCNC: 8.7 MG/DL (ref 8.7–10.5)
CHLORIDE SERPL-SCNC: 105 MMOL/L (ref 95–110)
CO2 SERPL-SCNC: 24 MMOL/L (ref 23–29)
CREAT SERPL-MCNC: 0.9 MG/DL (ref 0.5–1.4)
DIFFERENTIAL METHOD BLD: ABNORMAL
EOSINOPHIL # BLD AUTO: 0.6 K/UL (ref 0–0.5)
EOSINOPHIL NFR BLD: 6.2 % (ref 0–8)
ERYTHROCYTE [DISTWIDTH] IN BLOOD BY AUTOMATED COUNT: 16.6 % (ref 11.5–14.5)
EST. GFR  (NO RACE VARIABLE): >60 ML/MIN/1.73 M^2
GLUCOSE SERPL-MCNC: 198 MG/DL (ref 70–110)
HCT VFR BLD AUTO: 25.9 % (ref 40–54)
HGB BLD-MCNC: 7.7 G/DL (ref 14–18)
IMM GRANULOCYTES # BLD AUTO: 0.05 K/UL (ref 0–0.04)
IMM GRANULOCYTES NFR BLD AUTO: 0.5 % (ref 0–0.5)
LYMPHOCYTES # BLD AUTO: 1.8 K/UL (ref 1–4.8)
LYMPHOCYTES NFR BLD: 18.9 % (ref 18–48)
MAGNESIUM SERPL-MCNC: 1.9 MG/DL (ref 1.6–2.6)
MCH RBC QN AUTO: 26.6 PG (ref 27–31)
MCHC RBC AUTO-ENTMCNC: 29.7 G/DL (ref 32–36)
MCV RBC AUTO: 89 FL (ref 82–98)
MONOCYTES # BLD AUTO: 0.6 K/UL (ref 0.3–1)
MONOCYTES NFR BLD: 6 % (ref 4–15)
NEUTROPHILS # BLD AUTO: 6.4 K/UL (ref 1.8–7.7)
NEUTROPHILS NFR BLD: 68.1 % (ref 38–73)
NRBC BLD-RTO: 0 /100 WBC
PHOSPHATE SERPL-MCNC: 2.8 MG/DL (ref 2.7–4.5)
PLATELET # BLD AUTO: 484 K/UL (ref 150–450)
PMV BLD AUTO: 8.5 FL (ref 9.2–12.9)
POCT GLUCOSE: 195 MG/DL (ref 70–110)
POCT GLUCOSE: 225 MG/DL (ref 70–110)
POCT GLUCOSE: 250 MG/DL (ref 70–110)
POTASSIUM SERPL-SCNC: 4.1 MMOL/L (ref 3.5–5.1)
PROT SERPL-MCNC: 6.7 G/DL (ref 6–8.4)
RBC # BLD AUTO: 2.9 M/UL (ref 4.6–6.2)
SODIUM SERPL-SCNC: 139 MMOL/L (ref 136–145)
WBC # BLD AUTO: 9.46 K/UL (ref 3.9–12.7)

## 2024-10-05 PROCEDURE — 63600175 PHARM REV CODE 636 W HCPCS: Mod: HCNC | Performed by: PHYSICIAN ASSISTANT

## 2024-10-05 PROCEDURE — 84100 ASSAY OF PHOSPHORUS: CPT | Mod: HCNC

## 2024-10-05 PROCEDURE — 25000003 PHARM REV CODE 250: Mod: HCNC

## 2024-10-05 PROCEDURE — 80053 COMPREHEN METABOLIC PANEL: CPT | Mod: HCNC

## 2024-10-05 PROCEDURE — 83735 ASSAY OF MAGNESIUM: CPT | Mod: HCNC

## 2024-10-05 PROCEDURE — 36415 COLL VENOUS BLD VENIPUNCTURE: CPT | Mod: HCNC

## 2024-10-05 PROCEDURE — 85025 COMPLETE CBC W/AUTO DIFF WBC: CPT | Mod: HCNC

## 2024-10-05 PROCEDURE — 27000207 HC ISOLATION: Mod: HCNC

## 2024-10-05 PROCEDURE — 25000003 PHARM REV CODE 250: Mod: HCNC | Performed by: PHYSICIAN ASSISTANT

## 2024-10-05 PROCEDURE — 99233 SBSQ HOSP IP/OBS HIGH 50: CPT | Mod: HCNC,,, | Performed by: PODIATRIST

## 2024-10-05 PROCEDURE — 63600175 PHARM REV CODE 636 W HCPCS: Mod: HCNC

## 2024-10-05 PROCEDURE — 20600001 HC STEP DOWN PRIVATE ROOM: Mod: HCNC

## 2024-10-05 RX ORDER — ACETAMINOPHEN 325 MG/1
650 TABLET ORAL EVERY 8 HOURS
Status: DISCONTINUED | OUTPATIENT
Start: 2024-10-05 | End: 2024-10-09 | Stop reason: HOSPADM

## 2024-10-05 RX ADMIN — ZINC SULFATE 220 MG (50 MG) CAPSULE 220 MG: CAPSULE at 09:10

## 2024-10-05 RX ADMIN — OXYCODONE HYDROCHLORIDE AND ACETAMINOPHEN 500 MG: 500 TABLET ORAL at 09:10

## 2024-10-05 RX ADMIN — THERA TABS 1 TABLET: TAB at 09:10

## 2024-10-05 RX ADMIN — GABAPENTIN 300 MG: 300 CAPSULE ORAL at 09:10

## 2024-10-05 RX ADMIN — SENNOSIDES 8.6 MG: 8.6 TABLET, FILM COATED ORAL at 09:10

## 2024-10-05 RX ADMIN — PIPERACILLIN SODIUM AND TAZOBACTAM SODIUM 4.5 G: 4; .5 INJECTION, POWDER, FOR SOLUTION INTRAVENOUS at 06:10

## 2024-10-05 RX ADMIN — FUROSEMIDE 20 MG: 20 TABLET ORAL at 09:10

## 2024-10-05 RX ADMIN — INSULIN GLARGINE 8 UNITS: 100 INJECTION, SOLUTION SUBCUTANEOUS at 09:10

## 2024-10-05 RX ADMIN — TAMSULOSIN HYDROCHLORIDE 0.4 MG: 0.4 CAPSULE ORAL at 09:10

## 2024-10-05 RX ADMIN — OXYCODONE HYDROCHLORIDE 10 MG: 5 TABLET ORAL at 09:10

## 2024-10-05 RX ADMIN — PIPERACILLIN SODIUM AND TAZOBACTAM SODIUM 4.5 G: 4; .5 INJECTION, POWDER, FOR SOLUTION INTRAVENOUS at 09:10

## 2024-10-05 RX ADMIN — OXYCODONE HYDROCHLORIDE 10 MG: 5 TABLET ORAL at 12:10

## 2024-10-05 RX ADMIN — ACETAMINOPHEN 650 MG: 325 TABLET ORAL at 09:10

## 2024-10-05 RX ADMIN — ACETAMINOPHEN 650 MG: 325 TABLET ORAL at 02:10

## 2024-10-05 RX ADMIN — DAPTOMYCIN 665 MG: 350 INJECTION, POWDER, LYOPHILIZED, FOR SOLUTION INTRAVENOUS at 12:10

## 2024-10-05 RX ADMIN — OXYCODONE HYDROCHLORIDE 10 MG: 5 TABLET ORAL at 06:10

## 2024-10-05 RX ADMIN — PIPERACILLIN SODIUM AND TAZOBACTAM SODIUM 4.5 G: 4; .5 INJECTION, POWDER, FOR SOLUTION INTRAVENOUS at 02:10

## 2024-10-05 RX ADMIN — INSULIN ASPART 1 UNITS: 100 INJECTION, SOLUTION INTRAVENOUS; SUBCUTANEOUS at 09:10

## 2024-10-05 RX ADMIN — POLYETHYLENE GLYCOL 3350 17 G: 17 POWDER, FOR SOLUTION ORAL at 09:10

## 2024-10-05 RX ADMIN — INSULIN ASPART 2 UNITS: 100 INJECTION, SOLUTION INTRAVENOUS; SUBCUTANEOUS at 05:10

## 2024-10-05 RX ADMIN — APIXABAN 5 MG: 5 TABLET, FILM COATED ORAL at 09:10

## 2024-10-05 NOTE — MEDICAL/APP STUDENT
Aaron Berg - Stepdown Flex ([facility]-)  History & Physical    Subjective:      Chief Complaint/Reason for Admission: Osteomyelitis     Saji Castañeda is a 74 y.o. male  w/ hx of DM, PAD s/p L AKA 3/2024, chronic anemia, chronic fung, paroxysmal Afib on apixaban, CHB s/p PM 10/2023, CKD, HTN, R foot osteo presented from [facility] for L hip wound prurulent drainage. On admission patient had a leukocytosis, he had a recent admission with micro notable for  pseudomonas (zosyn sensitive) and MRSA.      Patient reports feeling sore from debridement of R foot 10/3 and sacral wounds 10/1. L hip wound cultures postive for Proteus Mirabilis. Gen surg recommended no further surgical intervention and signed off.  MRI of R foot, which revealed acute osteomyelitis. MRI pelvis revealed b/l osteomyelitis of the posterior iliac bones.  R foot cultures show gram neg rods and staphylococcus spp, negative Gram stain. Id consulted, recommends to continue IV Zoysn and Daptomycin and plan for long-term antibiotic use to treat osteo upon d/c.  ID and Podiatry following. Continuing Zosyn and Daptomycin. Will continue to monitor for culture and sensitivity results. Can resume home Eliquis at this time.    Past Medical History:   Diagnosis Date    Arthritis     legs    Bacteremia due to Gram-negative bacteria 3/23/2021    Diabetes mellitus     Diabetes mellitus, type 2     Hyperlipidemia     Hypertension     Osteomyelitis     Palliative care encounter 5/24/2023     Past Surgical History:   Procedure Laterality Date    ABOVE-KNEE AMPUTATION Left 3/27/2024    Procedure: AMPUTATION, ABOVE KNEE;  Surgeon: Adal Arellano MD;  Location: [facility] OR [address] [place] [address];  Service: Vascular;  Laterality: Left;    ANGIOGRAPHY OF LOWER EXTREMITY N/A 2/3/2021    Procedure: Angiogram Extremity Bilateral;  Surgeon: Ernst Chacko MD;  Location: [facility] OR [address] [place] [address];  Service: Peripheral Vascular;  Laterality: N/A;  7.4 mintues fluoroscopy  "time  816.15 mGy  170.17 Gycm2    AORTOGRAPHY WITH EXTREMITY RUNOFF Bilateral 2/3/2021    Procedure: AORTOGRAM, WITH EXTREMITY RUNOFF;  Surgeon: Ernst Chacko MD;  [facility];  Service: Peripheral Vascular;  Laterality: Bilateral;    DEBRIDEMENT OF FOOT Left 2/23/2021    Procedure: DEBRIDEMENT, LEFT HEEL;  Surgeon: Mayra Schroeder DPM;  [facility];  Service: Podiatry;  Laterality: Left;    ESOPHAGOGASTRODUODENOSCOPY N/A 6/28/2024    Procedure: EGD (ESOPHAGOGASTRODUODENOSCOPY);  Surgeon: Adal Chandra MD;  Location: [facility] ENDO;  Service: Gastroenterology;  Laterality: N/A;    FOOT AMPUTATION  October 2010    left high midfoot amputation    IMPLANTATION OF LEADLESS PACEMAKER N/A 10/12/2023    Procedure: [id number];  Surgeon: VANESSA Delatorre MD;  Location: [facility] EP LAB;  Service: Cardiology;  Laterality: N/A;  AVB, MICRA, EH, [place]ES, [place] [id number]     Social History     Tobacco Use    Smoking status: Never    Smokeless tobacco: Never   Substance Use Topics    Alcohol use: No     Comment: occassional    Drug use: No       PTA Medications   Medication Sig    acetaminophen (TYLENOL) 325 MG tablet Take 650 mg by mouth every 6 (six) hours as needed for Pain.    amoxicillin-clavulanate 875-125mg (AUGMENTIN) 875-125 mg per tablet Take 1 tablet by mouth 2 (two) times daily.    apixaban (ELIQUIS) 5 mg Tab Take 1 tablet (5 mg total) by mouth 2 (two) times daily.    ascorbic acid, vitamin C, (VITAMIN C) 500 MG tablet Take 500 mg by mouth 2 (two) times daily.    BD ULTRA-FINE ADITYA PEN NEEDLE 32 gauge x 5/32" Ndle USE FOUR TIMES DAILY WITH MEALS AND NIGHTLY    blood sugar diagnostic (CONTOUR TEST STRIPS) Strp 1 strip by Misc.(Non-Drug; Combo Route) route 4 (four) times daily. Use to check blood glucose 4x daily    diphenhydrAMINE (BENADRYL) 25 mg capsule Take 1 capsule (25 mg total) by mouth every 6 (six) hours as needed for Itching.    ergocalciferol " (ERGOCALCIFEROL) 50,000 unit Cap Take 1 capsule (50,000 Units total) by mouth Every Friday. for 10 doses    ferrous sulfate 325 (65 FE) MG EC tablet Take 325 mg by mouth 2 (two) times daily.    furosemide (LASIX) 20 MG tablet Take 1 tablet (20 mg total) by mouth once daily.    gabapentin (NEURONTIN) 300 MG capsule Take 300 mg by mouth 2 (two) times daily.    insulin aspart U-100 (NOVOLOG) 100 unit/mL (3 mL) InPn pen Inject 5 Units into the skin 3 (three) times daily with meals.    insulin aspart U-100 (NOVOLOG) 100 unit/mL injection Inject 2-10 Units into the skin 3 (three) times daily before meals. 0-149=0  150-200: 2 units  201-250: 4 units  251-300: 6 units  301-350: 8 units  351-1000=10 units, NOTIFY MD    lancets (MICROLET LANCET) Misc 1 each by Misc.(Non-Drug; Combo Route) route 4 (four) times daily. Use to check blood sugars 4x daily    LANTUS SOLOSTAR U-100 INSULIN 100 unit/mL (3 mL) InPn pen Inject 17 Units into the skin 2 (two) times a day.    menthol-zinc oxide (CALMOSEPTINE) 0.44-20.6 % Oint Apply topically as needed.    multivitamin (THERAGRAN) per tablet Take 1 tablet by mouth once daily.    NIFEdipine (PROCARDIA-XL) 90 MG (OSM) 24 hr tablet Take 1 tablet (90 mg total) by mouth once daily.    nutritional supplements Powd Take 1 packet by mouth 2 (two) times a day.    oxyCODONE (ROXICODONE) 5 MG immediate release tablet Take 1 tablet (5 mg total) by mouth every 24 hours as needed for Pain. (Patient taking differently: Take 5 mg by mouth every 6 (six) hours as needed for Pain.)    pantoprazole (PROTONIX) 40 MG tablet Take 40 mg by mouth once daily. Before breakfast    potassium chloride SA (K-DUR,KLOR-CON) 20 MEQ tablet Take 20 mEq by mouth 2 (two) times daily.    sodium hypochlorite (DAKINS) external solution Apply to affected area topically one time per day    tamsulosin (FLOMAX) 0.4 mg Cap Take 0.4 mg by mouth once daily.    TRUE METRIX GLUCOSE METER Misc     zinc sulfate (ZINCATE) 50 mg zinc (220 mg)  capsule Take 220 mg by mouth every morning.     Review of patient's allergies indicates:  No Known Allergies     Review of Systems   Constitutional:  Negative for chills, diaphoresis and fever.   HENT:  Negative for hearing loss, sore throat and tinnitus.    Eyes:  Negative for blurred vision, double vision and redness.   Respiratory:  Negative for cough, hemoptysis, sputum production and stridor.    Cardiovascular:  Negative for chest pain, palpitations and leg swelling.   Gastrointestinal:  Negative for abdominal pain, heartburn, nausea and vomiting.   Genitourinary:  Negative for dysuria, frequency, hematuria and urgency.   Musculoskeletal:  Negative for myalgias and neck pain.   Skin:  Negative for itching and rash.   Neurological:  Negative for dizziness, tremors and headaches.   Endo/Heme/Allergies:  Negative for environmental allergies and polydipsia.   Psychiatric/Behavioral:  Negative for depression and hallucinations.        Objective:      Vital Signs (Most Recent)  Temp: 98 °F (36.7 °C) (10/05/24 1148)  Pulse: 70 (10/05/24 1148)  Resp: 16 (10/05/24 1148)  BP: 117/65 (10/05/24 1148)  SpO2: 96 % (10/05/24 1148)    Vital Signs Range (Last 24H):  Temp:  [97.8 °F (36.6 °C)-99.1 °F (37.3 °C)]   Pulse:  [45-88]   Resp:  [16-20]   BP: (117-146)/(62-68)   SpO2:  [96 %-100 %]     Physical Exam  Constitutional:       General: He is not in acute distress.     Appearance: Normal appearance. He is normal weight. He is not ill-appearing or toxic-appearing.   HENT:      Head: Normocephalic and atraumatic.      Nose: No congestion or rhinorrhea.   Eyes:      General: No scleral icterus.     Conjunctiva/sclera: Conjunctivae normal.   Cardiovascular:      Rate and Rhythm: Normal rate. Rhythm irregular.      Pulses: Normal pulses.      Heart sounds: Normal heart sounds. No murmur heard.     No friction rub. No gallop.   Pulmonary:      Effort: Pulmonary effort is normal. No respiratory distress.      Breath sounds: No  stridor. Rales present. No wheezing or rhonchi.   Abdominal:      General: Abdomen is flat. There is no distension.      Palpations: Abdomen is soft.      Tenderness: There is no abdominal tenderness. There is no guarding.   Musculoskeletal:         General: Swelling present. Normal range of motion.      Cervical back: Normal range of motion. No rigidity.      Right lower leg: No edema.      Left lower leg: No edema.   Lymphadenopathy:      Cervical: No cervical adenopathy.   Skin:     General: Skin is warm and dry.      Coloration: Skin is not jaundiced.      Findings: Lesion present. No bruising.   Neurological:      General: No focal deficit present.      Mental Status: He is alert and oriented to person, place, and time. Mental status is at baseline.      Cranial Nerves: No cranial nerve deficit.      Sensory: Sensory deficit present.   Psychiatric:         Mood and Affect: Mood normal.         Behavior: Behavior normal.         Thought Content: Thought content normal.         Judgment: Judgment normal.           Assessment:      Active Hospital Problems    Diagnosis  POA    *Osteomyelitis [M86.9]  Yes    Thrombocytosis [D75.839]  Yes    Anemia [D64.9]  Yes    Chronic indwelling Sams catheter [Z97.8]  Not Applicable    Sacral wound [S31.000A]  Yes    Stage 3b chronic kidney disease [N18.32]  Yes    Paroxysmal atrial fibrillation [I48.0]  Yes     Chronic    Wound of left leg [S81.802A]  Yes    Type 2 diabetes mellitus, with long-term current use of insulin [E11.9, Z79.4]  Not Applicable    Essential hypertension [I10]  Yes     Chronic      Resolved Hospital Problems   No resolved problems to display.       Plan:    Chronic Osteomyelitis of R heel  L hip wound infection, Proteus  MRSA history  History of Carbapenem resistant Pseudomonas   - Recent history of IV Vanc and Zoysn 7/25  - extensive grade 3-4 sacral wounds, L hip wound purulence  - blood cultures no growth x5 days  - CXR shows coarse interstitial  attenuation, similar to prior  - WBC elevated 23.2 9/30, trending down, 9.46  - hip wound cultures reveal proteus mirabilis, sensitive to Zoysn  - continue oxycodone and tylenol PRN for pain  - Gen Surgery signing off, performed drainage of L hip abscess and debridement of sacral wound 10/1, rec no further intervention  - Podiatry following, MRI revealed acute osteomyelitis of the posterior 2/3 of the calcaneous and early osteo of the lateral malleolus and 1st distal phalanx, recommending medial tx, no planned intervention  - MRI pelvis revealed b/l osteomyelitis of the posterior iliac bones.  - R foot cultures show gram neg rods and staphylococcus spp, negative Gram stain  - Id consulted, recommends to continue IV Zoysn and Daptomycin   - plan for long-term antibiotic use to treat osteo upon d/c     Paroxysmal Atrial Fibrillation on Eliquis   Pacemaker   PVD  CHF  - echo on 7/2024 showed EF of 55-60% with no significant valvular abnormalities   - follows with Vascular Surgeon Dr. Banda  - continue home Lasix and hold HCTZ, some UE edema  - electrolytes stable, trend CMP  - continue cardiac monitoring   - can continue home Eliquis, stable H&H      Anemia of Chronic Disease   Acute Anemia   - H&H 7.7 and 25.9  - received 1 unit of PRBCs 10/2 and 1 unit 10/3  - transfused 1 unit of PRBCs in ED  - hold iron supplement      Below Knee Amputation 3/24  - due to nonhealing L foot wound   - well-healed scar   - patient is non-ambulatory      Chronic Sams  Suspected Urinary Tract Infection  - UA shows +1 blood, 3+ leuk esterase, >100 WBCs and few bacteria, patient reports episode of dysuria yesterday and suprapubic pain on palpation   - culture shows no growth x5 days, completed        Type 2 Diabetes Mellitus on Insulin   - Hb A1c on 7/25 was 6  - continue insulin sliding scale      CKD3  - hold nephrotoxic medications  - continue Lasix at this time, hold HCTZ  - renally dose Vanc     HTN  - continuing home Lasix       Hypoalbuminemia   - albumin 1.4    DVT Ppx: continue home Eliquis   Disposition: awaiting cultures and ID recs for antibiotics

## 2024-10-05 NOTE — NURSING
Pt AAOx4, pt had no acute events this shift. VSS, MRI done this shift. Plan of care ongoing, call light and belongings in reach.

## 2024-10-05 NOTE — PROGRESS NOTES
[facility] ([place])  Infectious Disease  Progress Note    Patient Name: Saji Castañeda  MRN: [id number]  Admission Date: 9/29/2024  Length of Stay: 5 days  Attending Physician: Jm Rosa MD  Primary Care Provider: Vinod Elise MD    Isolation Status: Contact  Assessment/Plan:      ID  * Osteomyelitis  73 yo male admitted with R heel wound with MRI showing calcaneal osteo and pelvic wounds.  L hip wound debrided and cultures show proteus m. Per gen sx, L hip wound does not probe to bone. POD following.      10/03/2024: Afebrile and WBC WNL.  Per general surgery note, left hip wound still with purulence.  Podiatry performed bone biopsy 10/03/2024 on right heel.  Patient reports slight subjective improvement.  Patient had been previously treated for right heel osteo ending 08/12/2024 by [facility] for MRSA and  Pseudomonas infection.  Possible that R foot MRI findings are residual from prior infection and artifact.    10/4/24: Stable.  MRI pelvis with osteomyelitis.  R heel bone biopsy done.  Cultures pending.  Gen Sx signed off re: sacral/L hip wounds      Plan:  Can continue daptomycin for now as no GPC growth on cultures.  Continue zosyn  Rec speak with Gen Sx and/or IR about ischial bone biopsy for cultures but suspect likely low yield as has been on abx multiple days so risk may outweigh benefit - suspect will need prolonged IV abx for ischial osteo  FU calcaneal biopsy cultures done by POD.  Trend CRP  FU cultures  Will follow        Anticipated Disposition: tbd    Thank you for your consult. I will follow-up with patient. Please contact us if you have any additional questions.    JOHNNY Goel  Infectious Disease  [facility] ([place])    Subjective:     Principal Problem:Osteomyelitis    HPI: Saji Castañeda is a 74 y.o. 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 Arellano,anemia, BPH  s/p Chronic Sams, paroxysmal A-fib on eliquis, CHB s/p PPM medtronic (10/12/2023), CKD3, HTN, HLD and Osteomyelitis of R heel presenting from [facility] with suspected infection of L hip wound. He is nonambulatory . Patient has chronic, extensive sacral wounds, a R heel wound and a wound on the L hip with purulence and a foul odor. The patient was recommended to come to the ED due to concerns of wound infection by nursing staff at NH. Patient reports intermittent pain at the wound sites that is controlled with Oxycodone.  Notably the patient was admitted to the ED on 7/25 for AMS while on Vanc and Cefepime for Osteomyelitis. Cefepime neurotoxicity was suspected and therapy was switched to Vanc and Zosyn then Dapto/Zosyn with and EOC of 8/12/24.      He has been seen by gen sx and L hip debrided and cultures sent showing proteus m.  UCX - NG and blood cultures NGTD.  Podiatry following for R foot and MRI R foot shows: 1. Acute osteomyelitis of the posterior 2/3 of the calcaneus with a large overlying soft tissue ulcer that involves the distal insertional fibers of the Achilles tendon. 2. Early osteomyelitis of the lateral malleolus and 1st distal phalanx with overlying soft tissue ulcers. He has been treated with Zosyn.  ID consulted for abx recs.    Interval History:   No acute events  Afebrile and white blood cell count normal  Blood cultures no growth to date  Wound culture Proteus mirabilis  Bone culture right heel in process  Sacral and pain slightly improved  MRI pelvis with BL ischial osteo  Denies fevers chills sweats    Review of Systems   Constitutional:  Negative for chills, diaphoresis and fever.   Respiratory:  Negative for shortness of breath.    Cardiovascular:  Negative for chest pain.   Gastrointestinal:  Negative for abdominal pain, diarrhea, nausea and vomiting.   Genitourinary:  Negative for dysuria and hematuria.   Skin:  Positive for wound.     Objective:     Vital Signs (Most  Recent):  Temp: 98.1 °F (36.7 °C) (10/04/24 0722)  Pulse: 80 (10/04/24 1250)  Resp: 16 (10/04/24 1250)  BP: 139/65 (10/04/24 0722)  SpO2: 97 % (10/04/24 1250) Vital Signs (24h Range):  Temp:  [98.1 °F (36.7 °C)-98.6 °F (37 °C)] 98.1 °F (36.7 °C)  Pulse:  [45-80] 80  Resp:  [16-18] 16  SpO2:  [96 %-100 %] 97 %  BP: (139-143)/(58-65) 139/65     Weight: 83.4 kg (183 lb 13.8 oz)  Body mass index is 27.15 kg/m².    Estimated Creatinine Clearance: 58 mL/min (based on SCr of 1.1 mg/dL).     Physical Exam  Constitutional:       General: He is not in acute distress.     Appearance: He is well-developed. He is ill-appearing. He is not toxic-appearing or diaphoretic.       HENT:      Head: Normocephalic and atraumatic.   Cardiovascular:      Rate and Rhythm: Normal rate and regular rhythm.      Heart sounds: Normal heart sounds. No murmur heard.     No friction rub. No gallop.   Pulmonary:      Effort: Pulmonary effort is normal. No respiratory distress.      Breath sounds: Normal breath sounds. No wheezing or rales.   Abdominal:      General: Bowel sounds are normal. There is no distension.      Palpations: Abdomen is soft. There is no mass.      Tenderness: There is no abdominal tenderness. There is no guarding or rebound.   Skin:     General: Skin is warm and dry.   Neurological:      Mental Status: He is alert and oriented to person, place, and time.   Psychiatric:         Behavior: Behavior normal.                          Significant Labs: Blood Culture:   Recent Labs   Lab 06/28/24  2251 06/28/24  2252 07/25/24 2114 07/25/24 2115 09/29/24  1446   LABBLOO No growth after 5 days. No growth after 5 days. No growth after 5 days. No growth after 5 days. No Growth to date  No Growth to date  No Growth to date  No Growth to date  No Growth to date  No Growth to date  No Growth to date  No Growth to date  No Growth to date  No Growth to date     CBC:   Recent Labs   Lab 10/03/24  0359 10/03/24  1751 10/04/24  030    WBC 10.55 17.11* 11.08   HGB 6.8* 8.2* 7.5*   HCT 22.3* 27.3* 24.1*   * 503* 470*     CMP:   Recent Labs   Lab 10/03/24  0359 10/04/24  0308    140   K 3.7 3.8    106   CO2 23 26   * 233*   BUN 18 16   CREATININE 0.9 1.1   CALCIUM 8.0* 8.2*   PROT 6.0 6.1   ALBUMIN 1.3* 1.4*   BILITOT 0.2 0.2   ALKPHOS 147* 129   AST 15 13   ALT 19 15   ANIONGAP 8 8     Urine Culture:   Recent Labs   Lab 05/08/24  2121 05/19/24  2102 07/25/24  1257 09/29/24  1722   LABURIN PROVIDENCIA STUARTII  >100,000 cfu/ml  * CANDIDA GLABRATA  10,000 - 49,999 cfu/ml  Treatment of asymptomatic candiduria is not recommended (except for   specific populations). Candida isolated in the urine typically   represents colonization. If an indwelling urinary catheter is present  it should be removed or replaced.  * No growth No growth     Urine Studies:   Recent Labs   Lab 08/05/24  1515 09/29/24  1722   COLORU Yellow Colorless*   APPEARANCEUA Hazy* Clear   PHUR 6.0 6.0   SPECGRAV 1.015 1.010   PROTEINUA 1+* Negative   GLUCUA Negative Negative   KETONESU Negative Negative   BILIRUBINUA Negative Negative   OCCULTUA 1+* 1+*   NITRITE Negative Negative   UROBILINOGEN Negative  --    LEUKOCYTESUR 3+* 3+*   RBCUA 25* 15*   WBCUA >100* >100*   BACTERIA Occasional Few*   SQUAMEPITHEL  --  1   HYALINECASTS 0  --      Wound Culture:   Recent Labs   Lab 07/01/24  1241 09/29/24  1628 10/03/24  1303   LABAERO METHICILLIN RESISTANT STAPHYLOCOCCUS AUREUS  Few  *  PSEUDOMONAS AERUGINOSA   Few  * PROTEUS MIRABILIS  Few  Skin bossman also present  * No growth     All pertinent labs within the past 24 hours have been reviewed.    Significant Imaging: I have reviewed all pertinent imaging results/findings within the past 24 hours.  MRI Midfoot WO Contrast RT [[id number]] (Abnormal) Resulted: 10/01/24 1210   Order Status: Completed Updated: 10/01/24 1213   Narrative:     EXAMINATION:  MRI FOREFOOT WO CONTRAST RT; MRI HINDFOOT WO CONTRAST RT; MRI  MIDFOOT WO CONTRAST RT    CLINICAL HISTORY:  Osteomyelitis, foot;    TECHNIQUE:  MRI of the entire right foot with multiplanar and multisequence imaging.  No intravenous contrast was administered.    COMPARISON:  Right foot radiograph 09/30/2024, right foot CT 06/28/2024, and right foot MRI 05/16/2023.    FINDINGS:  Exam limited by motion artifact.    BONES: Confluent marrow edema throughout the posterior 2/3 of the calcaneus with corresponding T1 medullary hypointensity and suspected posterior cortical irregularity.  Comfort marrow edema throughout the lateral half of the lateral malleolus with grossly preserved cortical margins and T1 medullary signal.  Confluent marrow edema throughout the 1st distal phalanx with preserved T1 medullary signal and cortical margins.  Contour irregularity of the 5th metatarsal shaft, possibly sequela of a healed fracture or remote infection.  No acute fractures.  No avascular necrosis.    JOINTS: Scattered hindfoot, midfoot and forefoot osteoarthritis.  No subluxation or dislocation.  No joint effusions.    TENDONS: Full-thickness, partial width tearing of the insertional fibers of the Achilles tendon.  Posterior tibial, flexor digitorum longus, flexor hallucis longus, peroneus longus, peroneus brevis and extensor tendons appear grossly intact.    MUSCLES: Severe fatty degeneration and patchy edema of the visualized musculature, likely sequela of chronic denervation:    MISCELLANEOUS: Large soft tissue ulcer overlying the posterior and lateral aspects of the calcaneus.  Soft tissue ulceration overlying the lateral malleolus and 1st distal phalanx.  Generalized subcutaneous edema most pronounced along the dorsum of the foot.  No fluid collection.  Chronic thickening of the plantar aponeurosis.   Impression:       1. Acute osteomyelitis of the posterior 2/3 of the calcaneus with a large overlying soft tissue ulcer that involves the distal insertional fibers of the Achilles tendon.  2.  Early osteomyelitis of the lateral malleolus and 1st distal phalanx with overlying soft tissue ulcers.  3. Additional findings, as above.  This report was flagged in Epic as abnormal.    Electronically signed by resident: Raegan Melvin  Date: 10/01/2024  Time: 11:26    Electronically signed by: Erik Hart MD  Date: 10/01/2024  Time: 12:10   MRI Hindfoot WO Contrast RT [[id number]] (Abnormal) Resulted: 10/01/24 1210   Order Status: Completed Updated: 10/01/24 1213   Narrative:     EXAMINATION:  MRI FOREFOOT WO CONTRAST RT; MRI HINDFOOT WO CONTRAST RT; MRI MIDFOOT WO CONTRAST RT    CLINICAL HISTORY:  Osteomyelitis, foot;    TECHNIQUE:  MRI of the entire right foot with multiplanar and multisequence imaging.  No intravenous contrast was administered.    COMPARISON:  Right foot radiograph 09/30/2024, right foot CT 06/28/2024, and right foot MRI 05/16/2023.    FINDINGS:  Exam limited by motion artifact.    BONES: Confluent marrow edema throughout the posterior 2/3 of the calcaneus with corresponding T1 medullary hypointensity and suspected posterior cortical irregularity.  Comfort marrow edema throughout the lateral half of the lateral malleolus with grossly preserved cortical margins and T1 medullary signal.  Confluent marrow edema throughout the 1st distal phalanx with preserved T1 medullary signal and cortical margins.  Contour irregularity of the 5th metatarsal shaft, possibly sequela of a healed fracture or remote infection.  No acute fractures.  No avascular necrosis.    JOINTS: Scattered hindfoot, midfoot and forefoot osteoarthritis.  No subluxation or dislocation.  No joint effusions.    TENDONS: Full-thickness, partial width tearing of the insertional fibers of the Achilles tendon.  Posterior tibial, flexor digitorum longus, flexor hallucis longus, peroneus longus, peroneus brevis and extensor tendons appear grossly intact.    MUSCLES: Severe fatty degeneration and patchy edema of the visualized musculature,  likely sequela of chronic denervation:    MISCELLANEOUS: Large soft tissue ulcer overlying the posterior and lateral aspects of the calcaneus.  Soft tissue ulceration overlying the lateral malleolus and 1st distal phalanx.  Generalized subcutaneous edema most pronounced along the dorsum of the foot.  No fluid collection.  Chronic thickening of the plantar aponeurosis.   Impression:       1. Acute osteomyelitis of the posterior 2/3 of the calcaneus with a large overlying soft tissue ulcer that involves the distal insertional fibers of the Achilles tendon.  2. Early osteomyelitis of the lateral malleolus and 1st distal phalanx with overlying soft tissue ulcers.  3. Additional findings, as above.  This report was flagged in Epic as abnormal.    Electronically signed by resident: Raegan Melvin  Date: 10/01/2024  Time: 11:26    Electronically signed by: Erik Hart MD  Date: 10/01/2024  Time: 12:10   MRI Forefoot WO Contrast RT [[id number]] (Abnormal) Resulted: 10/01/24 1210   Order Status: Completed Updated: 10/01/24 1213   Narrative:     EXAMINATION:  MRI FOREFOOT WO CONTRAST RT; MRI HINDFOOT WO CONTRAST RT; MRI MIDFOOT WO CONTRAST RT    CLINICAL HISTORY:  Osteomyelitis, foot;    TECHNIQUE:  MRI of the entire right foot with multiplanar and multisequence imaging.  No intravenous contrast was administered.    COMPARISON:  Right foot radiograph 09/30/2024, right foot CT 06/28/2024, and right foot MRI 05/16/2023.    FINDINGS:  Exam limited by motion artifact.    BONES: Confluent marrow edema throughout the posterior 2/3 of the calcaneus with corresponding T1 medullary hypointensity and suspected posterior cortical irregularity.  Comfort marrow edema throughout the lateral half of the lateral malleolus with grossly preserved cortical margins and T1 medullary signal.  Confluent marrow edema throughout the 1st distal phalanx with preserved T1 medullary signal and cortical margins.  Contour irregularity of the 5th  metatarsal shaft, possibly sequela of a healed fracture or remote infection.  No acute fractures.  No avascular necrosis.    JOINTS: Scattered hindfoot, midfoot and forefoot osteoarthritis.  No subluxation or dislocation.  No joint effusions.    TENDONS: Full-thickness, partial width tearing of the insertional fibers of the Achilles tendon.  Posterior tibial, flexor digitorum longus, flexor hallucis longus, peroneus longus, peroneus brevis and extensor tendons appear grossly intact.    MUSCLES: Severe fatty degeneration and patchy edema of the visualized musculature, likely sequela of chronic denervation:    MISCELLANEOUS: Large soft tissue ulcer overlying the posterior and lateral aspects of the calcaneus.  Soft tissue ulceration overlying the lateral malleolus and 1st distal phalanx.  Generalized subcutaneous edema most pronounced along the dorsum of the foot.  No fluid collection.  Chronic thickening of the plantar aponeurosis.   Impression:       1. Acute osteomyelitis of the posterior 2/3 of the calcaneus with a large overlying soft tissue ulcer that involves the distal insertional fibers of the Achilles tendon.  2. Early osteomyelitis of the lateral malleolus and 1st distal phalanx with overlying soft tissue ulcers.  3. Additional findings, as above.  This report was flagged in Epic as abnormal.    Electronically signed by resident: Raegan Melvin  Date: 10/01/2024  Time: 11:26    Electronically signed by: Erik Hart MD  Date: 10/01/2024  Time: 12:10   US Lower Extremity Arteries Bilateral [[id number]] Resulted: 09/30/24 1712   Order Status: Completed Updated: 09/30/24 1715   Narrative:     EXAMINATION:  US LOWER EXTREMITY ARTERIES BILATERAL    CLINICAL HISTORY:  wound healing;    TECHNIQUE:  Bilateral spectral, color and grayscale images of the large arteries of both lower extremities were performed.    COMPARISON:  Multiple ultrasounds, most recent 07/26/2024    FINDINGS:  Right lower extremity: Peak  systolic velocity in the right lower extremity arterial system measures 99 cm/sec in the anterior tibial artery.  No evidence of a hemodynamically significant stenosis.  There are triphasic waveforms throughout most of the right lower extremity.  There are monophasic waveforms in the posterior tibial artery.    Left lower extremity: History of above the knee amputation.  Peak systolic velocity in the left lower extremity arterial system measures 80 cm/sec in the deep femoral artery.  No evidence of a hemodynamically significant stenosis.  There are triphasic and biphasic waveforms throughout most of the left lower extremity.  There are monophasic waveforms in the distal femoral artery.    Miscellaneous: Arrhythmia.  Prominent right inguinal lymph node measuring 0.9 cm short axis, presumably reactive.   Impression:       Left above the knee amputation.    No hemodynamically significant arterial stenosis in either lower extremity.    Arrhythmia.  Correlate with EKG.      Electronically signed by: Rishi Ramos  Date: 09/30/2024  Time: 17:12   X-Ray Foot Complete Right [[id number]] (Abnormal) Resulted: 09/30/24 1426   Order Status: Completed Updated: 09/30/24 1429   Narrative:     EXAMINATION:  XR FOOT COMPLETE 3 VIEW RIGHT    CLINICAL HISTORY:  R heel wound;.    TECHNIQUE:  AP, lateral, and oblique views of the right foot were performed.    COMPARISON:  06/28/2024    FINDINGS:  Three views right foot.    There is osteopenia.  There is remote injury of the 5th metatarsal.  There is motion blurring.  There is questionable periosteal reaction about the distal aspect of the 5th metatarsal versus motion blurring.  There is edema about the foot.  There are degenerative changes of the calcaneus as well as of the dorsal foot.  There is vascular calcification.  There is skin wound overlying the calcaneus.   Impression:       This report was flagged in Epic as abnormal.    1. Questionable indistinctness about the distal aspect of  the 5th metatarsal, may reflect subacute injury to the region however infectious process not excluded.  Please note, evaluation is limited given motion blurring in the region.  Correlation is advised.  2. Diffuse edema about the foot noting skin wound at the level of the calcaneus.  Correlation is needed to exclude exposed bone.  3. There appears to be increased erosive change of the posterior aspect of the calcaneus since the previous examination.  Underlying or chronic osteomyelitis not excluded.      Electronically signed by: Milton Dove MD  Date: 09/30/2024  Time: 14:26   X-Ray Chest AP Portable [[id number]] Resulted: 09/29/24 1759   Order Status: Completed Updated: 09/29/24 1801   Narrative:     EXAMINATION:  XR CHEST AP PORTABLE    CLINICAL HISTORY:  wound infection;    TECHNIQUE:  Single frontal view of the chest was performed.    COMPARISON:  07/25/2024    FINDINGS:  The cardiomediastinal silhouette is not enlarged.  There is elevation of the right hemidiaphragm..  There is no pleural effusion.  The trachea is midline.  The lungs are symmetrically expanded bilaterally with coarse interstitial attenuation.  No large focal consolidation seen.  There is no pneumothorax.  The osseous structures are remarkable for degenerative change..   Impression:       Course interstitial attenuation, similar to the prior exam.  Superimposed edema is a consideration.      Electronically signed by: Milton Dove MD  Date: 09/29/2024  Time: 17:59      Imaging History     2024    Date Procedure Name Study Review Link PACS Link Status Accession Number Location   10/03/24 10:22 AM Cardiac monitoring strips Study Review  Final     10/02/24 07:55 PM Cardiac monitoring strips Study Review  Final     10/02/24 03:09 PM Cardiac monitoring strips Study Review  Final     10/01/24 10:22 PM Cardiac monitoring strips Study Review  Final     10/01/24 11:23 AM MRI Hindfoot WO Contrast RT Study Review  [id number] [facility]    10/01/24 11:23 AM MRI Midfoot WO Contrast RT Study Review  Images Final [id number] [facility]   10/01/24 11:23 AM MRI Forefoot WO Contrast RT Study Review  Images Final [id number] [facility]   10/01/24 09:44 AM Cardiac monitoring strips Study Review  Final     09/30/24 11:22 PM Cardiac monitoring strips Study Review  Final     09/30/24 05:36 PM Cardiac monitoring strips Study Review  Final     09/30/24 04:54 PM US Lower Extremity Arteries Bilateral Study Review  Images Final [id number] [facility]   09/30/24 01:10 PM X-Ray Foot Complete Right Study Review  Images Final [id number] [facility]   09/29/24 10:35 PM Cardiac monitoring strips Study Review  Final     09/29/24 05:10 PM X-Ray Chest AP Portable Study Review  Images Final [id number] [facility]

## 2024-10-05 NOTE — SUBJECTIVE & OBJECTIVE
Interval History: NAEON. Hypertension /68 overnight, but now normotensive.  R foot bone biopsy aerobic culture positive for staph species and GNR.  Patient reports 10/10 sacral pain. No new concerns.    Review of Systems   Constitutional:  Negative for appetite change.   Respiratory:  Negative for shortness of breath.    Cardiovascular:  Negative for chest pain.   Gastrointestinal:  Positive for abdominal pain. Negative for abdominal distention, diarrhea, nausea and vomiting.   Skin:  Positive for wound (painful sacral wound).     Objective:     Vital Signs (Most Recent):  Temp: 98 °F (36.7 °C) (10/05/24 1148)  Pulse: 74 (10/05/24 1510)  Resp: 17 (10/05/24 1255)  BP: 117/65 (10/05/24 1148)  SpO2: 96 % (10/05/24 1148) Vital Signs (24h Range):  Temp:  [97.8 °F (36.6 °C)-99.1 °F (37.3 °C)] 98 °F (36.7 °C)  Pulse:  [45-88] 74  Resp:  [16-20] 17  SpO2:  [96 %-100 %] 96 %  BP: (117-146)/(62-68) 117/65     Weight: 83.4 kg (183 lb 13.8 oz)  Body mass index is 27.15 kg/m².  No intake or output data in the 24 hours ending 10/05/24 1531      Physical Exam  Constitutional:       General: He is not in acute distress.     Appearance: He is ill-appearing. He is not diaphoretic.   HENT:      Head: Normocephalic and atraumatic.      Right Ear: External ear normal.      Left Ear: External ear normal.      Mouth/Throat:      Mouth: Mucous membranes are moist.   Eyes:      General:         Right eye: No discharge.         Left eye: No discharge.      Extraocular Movements: Extraocular movements intact.   Cardiovascular:      Rate and Rhythm: Normal rate. Rhythm irregular.      Heart sounds: No murmur heard.  Pulmonary:      Effort: Pulmonary effort is normal. No respiratory distress.      Breath sounds: No wheezing.   Abdominal:      General: Abdomen is flat. There is no distension.      Tenderness: Tenderness: RLQ tenderness.   Musculoskeletal:      Cervical back: Normal range of motion.      Comments: Prior left AKA, no left  lower extremity. Surgical scar well-healed  Right lower extremity with cracked, dry skin as well as right heel wound  R heel dressing in tact      Skin:     General: Skin is dry.   Neurological:      General: No focal deficit present.      Mental Status: He is alert.             Significant Labs: All pertinent labs within the past 24 hours have been reviewed.    Significant Imaging: I have reviewed all pertinent imaging results/findings within the past 24 hours.

## 2024-10-05 NOTE — ASSESSMENT & PLAN NOTE
Chronic, uncontrolled. Latest blood pressure and vitals reviewed-     Temp:  [97.8 °F (36.6 °C)-99.1 °F (37.3 °C)]   Pulse:  [45-88]   Resp:  [16-20]   BP: (117-146)/(62-68)   SpO2:  [96 %-100 %] .   Home meds for hypertension were reviewed and noted below.   Hypertension Medications               furosemide (LASIX) 20 MG tablet Take 1 tablet (20 mg total) by mouth once daily.    NIFEdipine (PROCARDIA-XL) 90 MG (OSM) 24 hr tablet Take 1 tablet (90 mg total) by mouth once daily.            While in the hospital, will manage blood pressure as follows; Adjust home antihypertensive regimen as follows- home home Nifedipine    Will utilize p.r.n. blood pressure medication only if patient's blood pressure greater than 180/110 and he develops symptoms such as worsening chest pain or shortness of breath.     - hold diuretics, no signs of volume overload

## 2024-10-05 NOTE — NURSING
Pt had no acute events this shift. Plan of care continued, WC done, pt resting in bed, bed low and locked, call light and belongings in reach.

## 2024-10-05 NOTE — ASSESSMENT & PLAN NOTE
Left hip wound with purulent foul smelling drainage    - Wound cx ordered, f/u results  - On Daptomycin/Zosyn  - Gen surg consulted for debridement ,completed  beside debridement of L hip and sacral wound on 10/1

## 2024-10-05 NOTE — ASSESSMENT & PLAN NOTE
75 yo male admitted with R heel wound with MRI showing calcaneal osteo and pelvic wounds.  L hip wound debrided and cultures show proteus m. Per gen sx, L hip wound does not probe to bone. POD following.      10/03/2024: Afebrile and WBC WNL.  Per general surgery note, left hip wound still with purulence.  Podiatry performed bone biopsy 10/03/2024 on right heel.  Patient reports slight subjective improvement.  Patient had been previously treated for right heel osteo ending 08/12/2024 by [facility] ID for MRSA and  Pseudomonas infection.  Possible that R foot MRI findings are residual from prior infection and artifact.    10/4/24: Stable.  MRI pelvis with osteomyelitis.  R heel bone biopsy done.  Cultures pending.  Gen Sx signed off re: sacral/L hip wounds      Plan:  Can continue daptomycin for now as no GPC growth on cultures.  Continue zosyn  Rec speak with Gen Sx and/or IR about ischial bone biopsy for cultures but suspect likely low yield as has been on abx multiple days so risk may outweigh benefit - suspect will need prolonged IV abx for ischial osteo  FU calcaneal biopsy cultures done by POD.  Trend CRP  FU cultures  Will follow

## 2024-10-05 NOTE — ASSESSMENT & PLAN NOTE
Left buttocks wound cx: proteus positive, susceptibles pending    - MRI pelvis ordered, Bilateral osteomyelitis of the posterior iliac bones.   - Wound Cx positive for proteus

## 2024-10-05 NOTE — ASSESSMENT & PLAN NOTE
R heal OM diagnosed during admission 6/26-7/11 with bone cx positive for pseudomonas and MRSA. Discharged on vanc and cefepime for 6 week course.  Recent admission 8/2-8/6, at that time Cefepime discontinued due to c/f Cefepime neurotoxicity, then developed Vanc YNES, and discharged on Daptomycin and Zosyn  - extensive grade 3-4 sacral wounds, L hip wound purulence  - chronic osteomyelitis of R heel with nonhealing ulcer    - Wound Cx from L buttocks, positive for proteus susceptible zosyn  - Wound Cx from R foot Aerobic culture, positive for staph species and GNR  - Blood Cx NGTD  - started on IV Vanc in the ED, recommend renally dosing  - continue Vanc 1750 mg q12h   - continue Zosyn  - Wound care consulted, recommended podiatry consult, providing   - WBC elevated 13.6, likely due to wound infection  - trend CBC and CMP  - procalcitonin 0.12  - continue oxycodone and tylenol PRN for pain  - per pharmacy, obtain vanc trough on 10/01 at 6:00 before giving 4th dose, range 10-20 mcg/mL  - podiatry consulted, performed bone biopsy and debridement of the R heel on 10/3.   - MRI R foot ordered , c/f acute osteomyelitis of 2/3 calcaneus and early osteomyelitis of lateral malleolus & 1st distal phalanx  - ID consulted, IV Dapto started 10/2/24  - MRI pelvis ordered, Bilateral osteomyelitis of the posterior iliac bones  - On Daptomycin/ Zosyn

## 2024-10-05 NOTE — PROGRESS NOTES
[facility] ([place] [address])  [facility]  Progress Note    Patient Name: Saji Castañeda  MRN: [id number]  Patient Class: IP- Inpatient   Admission Date: 9/29/2024  Length of Stay: 5 days  Attending Physician: Jm Rosa MD  Primary Care Provider: Vinod Elise MD        Subjective:     Principal Problem:Osteomyelitis        HPI:  Saji Castañeda is a 74 y.o. 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 Arellano, Chronic Multifactorial Anemia, BPH s/p Chronic Sams, paroxysmal A-fib on eliquis, CHB s/p PPM medtronic (10/12/2023), CKD3, Multifactorial HTN, HLD and Osteomyelitis of R foot presenting from [facility] with suspected infection of L hip wound. He is nonambulatory . Patient has chronic, extensive sacral wounds, a R heel wound and a wound on the L hip with purulence and a foul odor. The patient was recommended to come to the ED due to concerns of wound infection by nursing staff at NH. Patient reports intermittent pain at the wound sites that is controlled with Oxycodone. Patient states that wound care manages these sites at the nursing home. Patient is alert, oriented and responsive to questioning however he is a poor historian regarding his medical history. Per Chart Review the patient was admitted to the ED on 7/25 for AMS while on Vanc and Cefepime for Osteomyelitis. Cefepime neurotoxicity was suspected and therapy was switched to Vanc and Zosyn. He then developed  He was transferred to [facility] at that time and had a pacemaker placed. Patient reports currently being on oral antibiotic therapy but is unsure what he is taking. He reports taking his medications daily that the nurse at NH provides. He reports never smoking or using drugs and no longer drinks alcohol.      Patient reports chronic Sams cath usage for unknown reason that was last changed today. He reports an episode of dysuria  yesterday and endorses suprapubic pain on palpation. He denies hematuria, urinary frequency/urgency, urinary odor or abdominal pain.      He also reports a mild cough and chills for the past several weeks with scant sputum production but denies fever, chest pain, SOB or recent illness. He reports chronic diaphoresis at night that requires him to change clothes in the morning.      Overview/Hospital Course:   74 y.o. 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 Arellano, Chronic Multifactorial Anemia, BPH s/p Chronic Sams, paroxysmal A-fib on eliquis, CHB s/p PPM medtronic (10/12/2023), CKD3, Multifactorial HTN, HLD and Osteomyelitis of R foot presenting from [facility] with suspected infection of sacral wound. Started on Vanc and Zosyn given prior history of prior susceptibilities. General surgery plans for wound debridement 10/1. Wound care and podiatry following. X-ray of right foot c/f chronic osteomyelitis. MRI with acute osteomyelitis of the posterior 2/3 of the calcaneus and early osteomyelitis of the lateral malleolus and 1st distal phalanx. R heel bone biopsy and debridement done by podiatry on 10/3. R foot bone culture positive for staph and gram negative mikey species.    Interval History: NAEON. Hypertension /68 overnight, but now normotensive.  R foot bone biopsy aerobic culture positive for staph species and GNR.  Patient reports 10/10 sacral pain. No new concerns.    Review of Systems   Constitutional:  Negative for appetite change.   Respiratory:  Negative for shortness of breath.    Cardiovascular:  Negative for chest pain.   Gastrointestinal:  Positive for abdominal pain. Negative for abdominal distention, diarrhea, nausea and vomiting.   Skin:  Positive for wound (painful sacral wound).     Objective:     Vital Signs (Most Recent):  Temp: 98 °F (36.7 °C) (10/05/24 1148)  Pulse: 74 (10/05/24  1510)  Resp: 17 (10/05/24 1255)  BP: 117/65 (10/05/24 1148)  SpO2: 96 % (10/05/24 1148) Vital Signs (24h Range):  Temp:  [97.8 °F (36.6 °C)-99.1 °F (37.3 °C)] 98 °F (36.7 °C)  Pulse:  [45-88] 74  Resp:  [16-20] 17  SpO2:  [96 %-100 %] 96 %  BP: (117-146)/(62-68) 117/65     Weight: 83.4 kg (183 lb 13.8 oz)  Body mass index is 27.15 kg/m².  No intake or output data in the 24 hours ending 10/05/24 1531      Physical Exam  Constitutional:       General: He is not in acute distress.     Appearance: He is ill-appearing. He is not diaphoretic.   HENT:      Head: Normocephalic and atraumatic.      Right Ear: External ear normal.      Left Ear: External ear normal.      Mouth/Throat:      Mouth: Mucous membranes are moist.   Eyes:      General:         Right eye: No discharge.         Left eye: No discharge.      Extraocular Movements: Extraocular movements intact.   Cardiovascular:      Rate and Rhythm: Normal rate. Rhythm irregular.      Heart sounds: No murmur heard.  Pulmonary:      Effort: Pulmonary effort is normal. No respiratory distress.      Breath sounds: No wheezing.   Abdominal:      General: Abdomen is flat. There is no distension.      Tenderness: Tenderness: RLQ tenderness.   Musculoskeletal:      Cervical back: Normal range of motion.      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 Surgical scar well-healed  Right lower extremity with cracked, dry skin as well as right heel wound  R heel dressing in tact      Skin:     General: Skin is dry.   Neurological:      General: No focal deficit present.      Mental Status: He is alert.             Significant Labs: All pertinent labs within the past 24 hours have been reviewed.    Significant Imaging: I have reviewed all pertinent imaging results/findings within the past 24 hours.    Assessment/Plan:      * Osteomyelitis  R heal OM diagnosed during admission 6/26-7/11 with bone cx positive for pseudomonas and MRSA. Discharged on vanc and cefepime for 6 week  course.  Recent admission 8/2-8/6, at that time Cefepime discontinued due to c/f Cefepime neurotoxicity, then developed Vanc YNES, and discharged on Daptomycin and Zosyn  - extensive grade 3-4 sacral wounds, L hip wound purulence  - chronic osteomyelitis of R heel with nonhealing ulcer    - Wound Cx from L buttocks, positive for proteus susceptible zosyn  - Wound Cx from R foot Aerobic culture, positive for staph species and GNR  - Blood Cx NGTD  - started on IV Vanc in the ED, recommend renally dosing  - continue Vanc 1750 mg q12h   - continue Zosyn  - Wound care consulted, recommended podiatry consult, providing   - WBC elevated 13.6, likely due to wound infection  - trend CBC and CMP  - procalcitonin 0.12  - continue oxycodone and tylenol PRN for pain  - per pharmacy, obtain vanc trough on 10/01 at 6:00 before giving 4th dose, range 10-20 mcg/mL  - podiatry consulted, performed bone biopsy and debridement of the R heel on 10/3.   - MRI R foot ordered , c/f acute osteomyelitis of 2/3 calcaneus and early osteomyelitis of lateral malleolus & 1st distal phalanx  - ID consulted, IV Dapto started 10/2/24  - MRI pelvis ordered, Bilateral osteomyelitis of the posterior iliac bones  - On Daptomycin/ Zosyn    Anemia  Anemia is likely due to chronic disease due to Chronic Kidney Disease. Most recent hemoglobin and hematocrit are listed below.  Recent Labs     10/01/24  0249 10/02/24  0514 10/03/24  0359   HGB 7.1* 6.8* 6.8*   HCT 23.7* 22.1* 22.3*       Plan  - Monitor serial CBC: daily  - Transfuse PRBC if patient becomes hemodynamically unstable, symptomatic or H/H drops below 7/21.  - Patient has received 1 units of PRBCs on 9/29  & 1 unit pRBCs on 10/3  - Patient's anemia is currently   7.1, below baseline 8-9  - Likely multifactorial with AOCD, as well as potentially GI bleed vs  AVM. Continue to monitor  - H&H 6.3 and 21.6   - hold iron supplement   - guaic + stool test  - Elevated ferritin, anemia 2/2 chronic  disease  -      Chronic indwelling Fung catheter  Pt has failed multiple void trials in the past, now with chronic fung. Most recently replaced 9/29. C/f UTI  UA shows +1 blood, 3+ leuk esterase, >100 WBCs and few bacteria, patient reports episode of dysuria yesterday and suprapubic pain on palpation   - Ucx, NGTD          Sacral wound  Left buttocks wound cx: proteus positive, susceptibles pending    - MRI pelvis ordered, Bilateral osteomyelitis of the posterior iliac bones.   - Wound Cx positive for proteus    Stage 3b chronic kidney disease  Creatine stable for now. BMP reviewed- noted Estimated Creatinine Clearance: 58 mL/min (based on SCr of 1.1 mg/dL). according to latest data. Based on current GFR, CKD stage is stage 3 - GFR 30-59.  Monitor UOP and serial BMP and adjust therapy as needed. Renally dose meds. Avoid nephrotoxic medications and procedures.    - hold nephrotoxic medications  - hold Lasix at this time   - renally dose Vanc    Paroxysmal atrial fibrillation  Patient with Paroxysmal (<7 days) atrial fibrillation which is uncontrolled. Patient is currently in atrial fibrillation.      echo on 7/2024 showed EF of 55-60% with no significant valvular abnormalities   - follows with Vascular Surgeon Dr. Banda  - hold home Lasix and HCTZ, not volume overloaded   - electrolytes stable, trend CMP  - hold home Eliquis due to unstable H&H, trend CBC  - monitor Afib, continuous telemetry   - plan to restart home Eliquis following MRI today, currently no further plans for surgical intervention    Wound of left leg  Left hip wound with purulent foul smelling drainage    - Wound cx ordered, f/u results  - On Daptomycin/Zosyn  - Gen surg consulted for debridement ,completed  beside debridement of L hip and sacral wound on 10/1          Type 2 diabetes mellitus, with long-term current use of insulin   Hb A1c on 7/25 was 6  - Lantus 8 units BID, increased from 6 units BID due to elevated glucose on 10/4  -  continue LDSSI   - POCT glucose checks before meals and nightly      Essential hypertension  Chronic, uncontrolled. Latest blood pressure and vitals reviewed-     Temp:  [97.8 °F (36.6 °C)-99.1 °F (37.3 °C)]   Pulse:  [45-88]   Resp:  [16-20]   BP: (117-146)/(62-68)   SpO2:  [96 %-100 %] .   Home meds for hypertension were reviewed and noted below.   Hypertension Medications               furosemide (LASIX) 20 MG tablet Take 1 tablet (20 mg total) by mouth once daily.    NIFEdipine (PROCARDIA-XL) 90 MG (OSM) 24 hr tablet Take 1 tablet (90 mg total) by mouth once daily.            While in the hospital, will manage blood pressure as follows; Adjust home antihypertensive regimen as follows- home home Nifedipine    Will utilize p.r.n. blood pressure medication only if patient's blood pressure greater than 180/110 and he develops symptoms such as worsening chest pain or shortness of breath.     - hold diuretics, no signs of volume overload           VTE Risk Mitigation (From admission, onward)           Ordered     apixaban tablet 5 mg  2 times daily         10/05/24 1554     enoxaparin injection 40 mg  Every 24 hours         09/30/24 1935     Reason for No Pharmacological VTE Prophylaxis  Once        Question:  Reasons:  Answer:  Risk of Bleeding  Comment:  c/f bleeding with drop in Hgb    09/29/24 1914     IP VTE HIGH RISK PATIENT  Once         09/29/24 1914     Place sequential compression device  Until discontinued         09/29/24 1914                    Discharge Planning   OXANA: 10/7/2024     Code Status: Full Code   Is the patient medically ready for discharge?:     Reason for patient still in hospital (select all that apply): Treatment  Discharge Plan A: Skilled Nursing Facility   Discharge Delays: None known at this time              Anita Thomas MD  Department of Hospital Medicine   [facility] ([place]-[facility])

## 2024-10-06 LAB
ALBUMIN SERPL BCP-MCNC: 1.4 G/DL (ref 3.5–5.2)
ALP SERPL-CCNC: 99 U/L (ref 55–135)
ALT SERPL W/O P-5'-P-CCNC: 15 U/L (ref 10–44)
ANION GAP SERPL CALC-SCNC: 8 MMOL/L (ref 8–16)
AST SERPL-CCNC: 18 U/L (ref 10–40)
BASOPHILS # BLD AUTO: 0.04 K/UL (ref 0–0.2)
BASOPHILS NFR BLD: 0.4 % (ref 0–1.9)
BILIRUB SERPL-MCNC: 0.1 MG/DL (ref 0.1–1)
BUN SERPL-MCNC: 15 MG/DL (ref 8–23)
CALCIUM SERPL-MCNC: 8.5 MG/DL (ref 8.7–10.5)
CHLORIDE SERPL-SCNC: 107 MMOL/L (ref 95–110)
CO2 SERPL-SCNC: 25 MMOL/L (ref 23–29)
CREAT SERPL-MCNC: 0.9 MG/DL (ref 0.5–1.4)
DIFFERENTIAL METHOD BLD: ABNORMAL
EOSINOPHIL # BLD AUTO: 0.7 K/UL (ref 0–0.5)
EOSINOPHIL NFR BLD: 6.9 % (ref 0–8)
ERYTHROCYTE [DISTWIDTH] IN BLOOD BY AUTOMATED COUNT: 16.7 % (ref 11.5–14.5)
EST. GFR  (NO RACE VARIABLE): >60 ML/MIN/1.73 M^2
GLUCOSE SERPL-MCNC: 137 MG/DL (ref 70–110)
HCT VFR BLD AUTO: 25.3 % (ref 40–54)
HGB BLD-MCNC: 7.3 G/DL (ref 14–18)
IMM GRANULOCYTES # BLD AUTO: 0.05 K/UL (ref 0–0.04)
IMM GRANULOCYTES NFR BLD AUTO: 0.5 % (ref 0–0.5)
LYMPHOCYTES # BLD AUTO: 1.6 K/UL (ref 1–4.8)
LYMPHOCYTES NFR BLD: 16.8 % (ref 18–48)
MAGNESIUM SERPL-MCNC: 1.8 MG/DL (ref 1.6–2.6)
MCH RBC QN AUTO: 25.3 PG (ref 27–31)
MCHC RBC AUTO-ENTMCNC: 28.9 G/DL (ref 32–36)
MCV RBC AUTO: 88 FL (ref 82–98)
MONOCYTES # BLD AUTO: 0.6 K/UL (ref 0.3–1)
MONOCYTES NFR BLD: 6.3 % (ref 4–15)
NEUTROPHILS # BLD AUTO: 6.5 K/UL (ref 1.8–7.7)
NEUTROPHILS NFR BLD: 69.1 % (ref 38–73)
NRBC BLD-RTO: 0 /100 WBC
PHOSPHATE SERPL-MCNC: 3.5 MG/DL (ref 2.7–4.5)
PLATELET # BLD AUTO: 464 K/UL (ref 150–450)
PMV BLD AUTO: 8.3 FL (ref 9.2–12.9)
POCT GLUCOSE: 129 MG/DL (ref 70–110)
POCT GLUCOSE: 201 MG/DL (ref 70–110)
POCT GLUCOSE: 241 MG/DL (ref 70–110)
POCT GLUCOSE: 265 MG/DL (ref 70–110)
POTASSIUM SERPL-SCNC: 3.8 MMOL/L (ref 3.5–5.1)
PROT SERPL-MCNC: 6.3 G/DL (ref 6–8.4)
RBC # BLD AUTO: 2.88 M/UL (ref 4.6–6.2)
SODIUM SERPL-SCNC: 140 MMOL/L (ref 136–145)
WBC # BLD AUTO: 9.37 K/UL (ref 3.9–12.7)

## 2024-10-06 PROCEDURE — 25000003 PHARM REV CODE 250: Mod: HCNC

## 2024-10-06 PROCEDURE — 63600175 PHARM REV CODE 636 W HCPCS: Mod: HCNC

## 2024-10-06 PROCEDURE — 80053 COMPREHEN METABOLIC PANEL: CPT | Mod: HCNC

## 2024-10-06 PROCEDURE — 84100 ASSAY OF PHOSPHORUS: CPT | Mod: HCNC

## 2024-10-06 PROCEDURE — 27000207 HC ISOLATION: Mod: HCNC

## 2024-10-06 PROCEDURE — 20600001 HC STEP DOWN PRIVATE ROOM: Mod: HCNC

## 2024-10-06 PROCEDURE — 36415 COLL VENOUS BLD VENIPUNCTURE: CPT | Mod: HCNC

## 2024-10-06 PROCEDURE — 25000003 PHARM REV CODE 250: Mod: HCNC | Performed by: PHYSICIAN ASSISTANT

## 2024-10-06 PROCEDURE — 85025 COMPLETE CBC W/AUTO DIFF WBC: CPT | Mod: HCNC

## 2024-10-06 PROCEDURE — 63600175 PHARM REV CODE 636 W HCPCS: Mod: HCNC | Performed by: PHYSICIAN ASSISTANT

## 2024-10-06 PROCEDURE — 83735 ASSAY OF MAGNESIUM: CPT | Mod: HCNC

## 2024-10-06 RX ORDER — INSULIN GLARGINE 100 [IU]/ML
12 INJECTION, SOLUTION SUBCUTANEOUS 2 TIMES DAILY
Status: DISCONTINUED | OUTPATIENT
Start: 2024-10-06 | End: 2024-10-09

## 2024-10-06 RX ADMIN — ACETAMINOPHEN 650 MG: 325 TABLET ORAL at 02:10

## 2024-10-06 RX ADMIN — INSULIN GLARGINE 12 UNITS: 100 INJECTION, SOLUTION SUBCUTANEOUS at 08:10

## 2024-10-06 RX ADMIN — GABAPENTIN 300 MG: 300 CAPSULE ORAL at 09:10

## 2024-10-06 RX ADMIN — OXYCODONE HYDROCHLORIDE AND ACETAMINOPHEN 500 MG: 500 TABLET ORAL at 09:10

## 2024-10-06 RX ADMIN — OXYCODONE HYDROCHLORIDE 10 MG: 5 TABLET ORAL at 06:10

## 2024-10-06 RX ADMIN — INSULIN ASPART 1 UNITS: 100 INJECTION, SOLUTION INTRAVENOUS; SUBCUTANEOUS at 09:10

## 2024-10-06 RX ADMIN — FUROSEMIDE 20 MG: 20 TABLET ORAL at 08:10

## 2024-10-06 RX ADMIN — PIPERACILLIN SODIUM AND TAZOBACTAM SODIUM 4.5 G: 4; .5 INJECTION, POWDER, FOR SOLUTION INTRAVENOUS at 02:10

## 2024-10-06 RX ADMIN — SENNOSIDES 8.6 MG: 8.6 TABLET, FILM COATED ORAL at 08:10

## 2024-10-06 RX ADMIN — POLYETHYLENE GLYCOL 3350 17 G: 17 POWDER, FOR SOLUTION ORAL at 08:10

## 2024-10-06 RX ADMIN — ACETAMINOPHEN 650 MG: 325 TABLET ORAL at 06:10

## 2024-10-06 RX ADMIN — PIPERACILLIN SODIUM AND TAZOBACTAM SODIUM 4.5 G: 4; .5 INJECTION, POWDER, FOR SOLUTION INTRAVENOUS at 09:10

## 2024-10-06 RX ADMIN — OXYCODONE HYDROCHLORIDE AND ACETAMINOPHEN 500 MG: 500 TABLET ORAL at 08:10

## 2024-10-06 RX ADMIN — INSULIN GLARGINE 12 UNITS: 100 INJECTION, SOLUTION SUBCUTANEOUS at 09:10

## 2024-10-06 RX ADMIN — THERA TABS 1 TABLET: TAB at 08:10

## 2024-10-06 RX ADMIN — INSULIN ASPART 3 UNITS: 100 INJECTION, SOLUTION INTRAVENOUS; SUBCUTANEOUS at 06:10

## 2024-10-06 RX ADMIN — GABAPENTIN 300 MG: 300 CAPSULE ORAL at 08:10

## 2024-10-06 RX ADMIN — ACETAMINOPHEN 650 MG: 325 TABLET ORAL at 10:10

## 2024-10-06 RX ADMIN — ZINC SULFATE 220 MG (50 MG) CAPSULE 220 MG: CAPSULE at 08:10

## 2024-10-06 RX ADMIN — HYDROMORPHONE HYDROCHLORIDE 0.2 MG: 1 INJECTION, SOLUTION INTRAMUSCULAR; INTRAVENOUS; SUBCUTANEOUS at 10:10

## 2024-10-06 RX ADMIN — APIXABAN 5 MG: 5 TABLET, FILM COATED ORAL at 08:10

## 2024-10-06 RX ADMIN — APIXABAN 5 MG: 5 TABLET, FILM COATED ORAL at 09:10

## 2024-10-06 RX ADMIN — DAPTOMYCIN 665 MG: 350 INJECTION, POWDER, LYOPHILIZED, FOR SOLUTION INTRAVENOUS at 01:10

## 2024-10-06 RX ADMIN — PIPERACILLIN SODIUM AND TAZOBACTAM SODIUM 4.5 G: 4; .5 INJECTION, POWDER, FOR SOLUTION INTRAVENOUS at 06:10

## 2024-10-06 RX ADMIN — TAMSULOSIN HYDROCHLORIDE 0.4 MG: 0.4 CAPSULE ORAL at 08:10

## 2024-10-06 NOTE — ASSESSMENT & PLAN NOTE
Chronic, uncontrolled. Latest blood pressure and vitals reviewed-     Temp:  [97.6 °F (36.4 °C)-98.7 °F (37.1 °C)]   Pulse:  [74-83]   Resp:  [12-18]   BP: (101-144)/(55-72)   SpO2:  [94 %-100 %] .   Home meds for hypertension were reviewed and noted below.   Hypertension Medications               furosemide (LASIX) 20 MG tablet Take 1 tablet (20 mg total) by mouth once daily.    NIFEdipine (PROCARDIA-XL) 90 MG (OSM) 24 hr tablet Take 1 tablet (90 mg total) by mouth once daily.            While in the hospital, will manage blood pressure as follows; Adjust home antihypertensive regimen as follows- held home Nifedipine    Will utilize p.r.n. blood pressure medication only if patient's blood pressure greater than 180/110 and he develops symptoms such as worsening chest pain or shortness of breath.     - hold diuretics, no signs of volume overload

## 2024-10-06 NOTE — ASSESSMENT & PLAN NOTE
Hb A1c on 7/25 was 6. Home Lantus 17 u BID  - Lantus 12 units BID, increased from 8 units BID due to elevated glucose  - continue LDSSI   - POCT glucose checks before meals and nightly

## 2024-10-06 NOTE — PLAN OF CARE
ID Follow up Note:    Patient not seen today.  Chart reviewed.    Afebrile, no leukocytosis.  HDS    Right foot bone culture preliminarily showing Staph caprae (susceptibility pending) and GNR    Remains on IV daptomycin and zosyn     Plan:   Continue IV daptomycin given Staph caprae on culture.     Continue IV zosyn    Follow up culture susceptibilities    JOHNNY Emery will see patient tomorrow and follow up with final recommendations     Thank you.   Please Secure Chat for any questions or concerns.  ANIYAH Maloney, ANP-C  Infectious Disease

## 2024-10-06 NOTE — SUBJECTIVE & OBJECTIVE
Interval History: NAEON. VSS. Right food wound culture positive staph caprae and gram negative mikey. Patient reports 10/10 pain on buttocks. He denies any new concerns.     Review of Systems   Constitutional:  Negative for appetite change.   Respiratory:  Negative for shortness of breath.    Cardiovascular:  Negative for chest pain.   Gastrointestinal:  Positive for abdominal pain. Negative for abdominal distention, diarrhea, nausea and vomiting.   Skin:  Positive for wound (painful sacral wound).     Objective:     Vital Signs (Most Recent):  Temp: 98.2 °F (36.8 °C) (10/06/24 1221)  Pulse: 79 (10/06/24 1221)  Resp: 18 (10/06/24 1221)  BP: (!) 144/67 (10/06/24 1221)  SpO2: 95 % (10/06/24 1221) Vital Signs (24h Range):  Temp:  [97.6 °F (36.4 °C)-98.7 °F (37.1 °C)] 98.2 °F (36.8 °C)  Pulse:  [74-83] 79  Resp:  [12-18] 18  SpO2:  [94 %-100 %] 95 %  BP: (101-144)/(55-72) 144/67     Weight: 83.4 kg (183 lb 13.8 oz)  Body mass index is 27.15 kg/m².    Intake/Output Summary (Last 24 hours) at 10/6/2024 1221  Last data filed at 10/6/2024 0608  Gross per 24 hour   Intake 820.88 ml   Output 1800 ml   Net -979.12 ml         Physical Exam  Constitutional:       General: He is not in acute distress.     Appearance: He is ill-appearing. He is not diaphoretic.   HENT:      Head: Normocephalic and atraumatic.      Right Ear: External ear normal.      Left Ear: External ear normal.      Mouth/Throat:      Mouth: Mucous membranes are moist.   Eyes:      General:         Right eye: No discharge.         Left eye: No discharge.      Extraocular Movements: Extraocular movements intact.   Cardiovascular:      Rate and Rhythm: Normal rate. Rhythm irregular.      Heart sounds: No murmur heard.  Pulmonary:      Effort: Pulmonary effort is normal. No respiratory distress.      Breath sounds: No wheezing.   Abdominal:      General: Abdomen is flat. There is no distension.      Tenderness: Tenderness: RLQ tenderness.   Musculoskeletal:       Cervical back: Normal range of motion.      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 Surgical scar well-healed  Right lower extremity with cracked, dry skin as well as right heel wound  R heel dressing in tact      Skin:     General: Skin is dry.   Neurological:      General: No focal deficit present.      Mental Status: He is alert.             Significant Labs: All pertinent labs within the past 24 hours have been reviewed.    Significant Imaging: I have reviewed all pertinent imaging results/findings within the past 24 hours.

## 2024-10-06 NOTE — MEDICAL/APP STUDENT
Aaron Berg - Stepdown Flex ([facility]-)  History & Physical    Subjective:      Chief Complaint/Reason for Admission:     Saji Castañeda is a 74 y.o. male  w/ hx of DM, PAD s/p L AKA 3/2024, chronic anemia, chronic fung, paroxysmal Afib on apixaban, CHB s/p PM 10/2023, CKD, HTN, R foot osteo presented from [facility] for L hip wound prurulent drainage. On admission patient had a leukocytosis, he had a recent admission with micro notable for  pseudomonas (zosyn sensitive) and MRSA.     Hospital Course  Patient reports feeling sore from debridement of R foot 10/3 and sacral wounds 10/1 but notes that pain is improved from yesterday. L hip wound cultures postive for Proteus Mirabilis. Gen surg recommended no further surgical intervention and signed off.  MRI of R foot, which revealed acute osteomyelitis. MRI pelvis revealed b/l osteomyelitis of the posterior iliac bones.  R foot cultures show gram neg rods and Staphylococcus Caprae, pending sensitives. Id consulted, recommends to continue IV Zoysn and Daptomycin and plan for long-term antibiotic use to treat osteo upon d/c.  ID and Podiatry following. Continuing Zosyn and Daptomycin. Will continue to monitor for culture and sensitivity results. Resumed home Eliquis at this time.       Past Medical History:   Diagnosis Date    Arthritis     legs    Bacteremia due to Gram-negative bacteria 3/23/2021    Diabetes mellitus     Diabetes mellitus, type 2     Hyperlipidemia     Hypertension     Osteomyelitis     Palliative care encounter 5/24/2023     Past Surgical History:   Procedure Laterality Date    ABOVE-KNEE AMPUTATION Left 3/27/2024    Procedure: AMPUTATION, ABOVE KNEE;  Surgeon: Adal Arellano MD;  Location: [facility] OR [address] [place] [address];  Service: Vascular;  Laterality: Left;    ANGIOGRAPHY OF LOWER EXTREMITY N/A 2/3/2021    Procedure: Angiogram Extremity Bilateral;  Surgeon: Ernst Chacko MD;  Location: [facility] OR [address] [place] [address];  Service: Peripheral Vascular;   "Laterality: N/A;  7.4 mintues fluoroscopy time  816.15 mGy  170.17 Gycm2    AORTOGRAPHY WITH EXTREMITY RUNOFF Bilateral 2/3/2021    Procedure: AORTOGRAM, WITH EXTREMITY RUNOFF;  Surgeon: Ernst Chacko MD;  Location: [facility] OR 2ND FLR;  Service: Peripheral Vascular;  Laterality: Bilateral;    DEBRIDEMENT OF FOOT Left 2/23/2021    Procedure: DEBRIDEMENT, LEFT HEEL;  Surgeon: Mayra Schroeder DPM;  Location: [facility] OR 1ST FLR;  Service: Podiatry;  Laterality: Left;    ESOPHAGOGASTRODUODENOSCOPY N/A 6/28/2024    Procedure: EGD (ESOPHAGOGASTRODUODENOSCOPY);  Surgeon: Adal Chandra MD;  Location: [facility] ENDO;  Service: Gastroenterology;  Laterality: N/A;    FOOT AMPUTATION  October 2010    left high midfoot amputation    IMPLANTATION OF LEADLESS PACEMAKER N/A 10/12/2023    Procedure: [id number];  Surgeon: VANESSA Delatorre MD;  Location: [facility] EP LAB;  Service: Cardiology;  Laterality: N/A;  AVB, MICRA, EH, [place]ES, [place] [id number]     Social History     Tobacco Use    Smoking status: Never    Smokeless tobacco: Never   Substance Use Topics    Alcohol use: No     Comment: occassional    Drug use: No       PTA Medications   Medication Sig    acetaminophen (TYLENOL) 325 MG tablet Take 650 mg by mouth every 6 (six) hours as needed for Pain.    amoxicillin-clavulanate 875-125mg (AUGMENTIN) 875-125 mg per tablet Take 1 tablet by mouth 2 (two) times daily.    apixaban (ELIQUIS) 5 mg Tab Take 1 tablet (5 mg total) by mouth 2 (two) times daily.    ascorbic acid, vitamin C, (VITAMIN C) 500 MG tablet Take 500 mg by mouth 2 (two) times daily.    BD ULTRA-FINE ADITYA PEN NEEDLE 32 gauge x 5/32" Ndle USE FOUR TIMES DAILY WITH MEALS AND NIGHTLY    blood sugar diagnostic (CONTOUR TEST STRIPS) Strp 1 strip by Misc.(Non-Drug; Combo Route) route 4 (four) times daily. Use to check blood glucose 4x daily    diphenhydrAMINE (BENADRYL) 25 mg capsule Take 1 capsule (25 mg total) by mouth every 6 (six) hours as needed " for Itching.    [] ergocalciferol (ERGOCALCIFEROL) 50,000 unit Cap Take 1 capsule (50,000 Units total) by mouth Every Friday. for 10 doses    ferrous sulfate 325 (65 FE) MG EC tablet Take 325 mg by mouth 2 (two) times daily.    furosemide (LASIX) 20 MG tablet Take 1 tablet (20 mg total) by mouth once daily.    gabapentin (NEURONTIN) 300 MG capsule Take 300 mg by mouth 2 (two) times daily.    insulin aspart U-100 (NOVOLOG) 100 unit/mL (3 mL) InPn pen Inject 5 Units into the skin 3 (three) times daily with meals.    insulin aspart U-100 (NOVOLOG) 100 unit/mL injection Inject 2-10 Units into the skin 3 (three) times daily before meals. 0-149=0  150-200: 2 units  201-250: 4 units  251-300: 6 units  301-350: 8 units  351-1000=10 units, NOTIFY MD    lancets (MICROLET LANCET) Misc 1 each by Misc.(Non-Drug; Combo Route) route 4 (four) times daily. Use to check blood sugars 4x daily    LANTUS SOLOSTAR U-100 INSULIN 100 unit/mL (3 mL) InPn pen Inject 17 Units into the skin 2 (two) times a day.    menthol-zinc oxide (CALMOSEPTINE) 0.44-20.6 % Oint Apply topically as needed.    multivitamin (THERAGRAN) per tablet Take 1 tablet by mouth once daily.    NIFEdipine (PROCARDIA-XL) 90 MG (OSM) 24 hr tablet Take 1 tablet (90 mg total) by mouth once daily.    nutritional supplements Powd Take 1 packet by mouth 2 (two) times a day.    oxyCODONE (ROXICODONE) 5 MG immediate release tablet Take 1 tablet (5 mg total) by mouth every 24 hours as needed for Pain. (Patient taking differently: Take 5 mg by mouth every 6 (six) hours as needed for Pain.)    pantoprazole (PROTONIX) 40 MG tablet Take 40 mg by mouth once daily. Before breakfast    potassium chloride SA (K-DUR,KLOR-CON) 20 MEQ tablet Take 20 mEq by mouth 2 (two) times daily.    sodium hypochlorite (DAKINS) external solution Apply to affected area topically one time per day    tamsulosin (FLOMAX) 0.4 mg Cap Take 0.4 mg by mouth once daily.    TRUE METRIX GLUCOSE METER [facility]      zinc sulfate (ZINCATE) 50 mg zinc (220 mg) capsule Take 220 mg by mouth every morning.     Review of patient's allergies indicates:  No Known Allergies     Review of Systems   Constitutional:  Negative for chills, diaphoresis and fever.   HENT:  Negative for congestion, hearing loss, sinus pain, sore throat and tinnitus.    Eyes:  Negative for blurred vision, photophobia, discharge and redness.   Respiratory:  Negative for cough, hemoptysis, sputum production, shortness of breath and stridor.    Cardiovascular:  Negative for chest pain, palpitations, leg swelling and PND.   Gastrointestinal:  Negative for abdominal pain, heartburn, nausea and vomiting.   Genitourinary:  Negative for dysuria, frequency, hematuria and urgency.   Musculoskeletal:  Negative for myalgias and neck pain.        Sacral pain at wound sites   Skin:  Negative for itching and rash.   Neurological:  Positive for weakness. Negative for dizziness, tingling and headaches.   Endo/Heme/Allergies:  Negative for environmental allergies and polydipsia.   Psychiatric/Behavioral:  Negative for hallucinations and substance abuse.        Objective:      Vital Signs (Most Recent)  Temp: 98.4 °F (36.9 °C) (10/06/24 0830)  Pulse: 77 (10/06/24 0830)  Resp: 18 (10/06/24 0830)  BP: (!) 143/63 (10/06/24 0830)  SpO2: 97 % (10/06/24 0830)    Vital Signs Range (Last 24H):  Temp:  [97.6 °F (36.4 °C)-98.7 °F (37.1 °C)]   Pulse:  [70-88]   Resp:  [12-18]   BP: (101-143)/(55-72)   SpO2:  [94 %-100 %]     Physical Exam  Constitutional:       General: He is not in acute distress.     Appearance: Normal appearance. He is normal weight. He is not ill-appearing, toxic-appearing or diaphoretic.   HENT:      Head: Normocephalic and atraumatic.      Nose: No congestion or rhinorrhea.   Eyes:      General: No scleral icterus.     Pupils: Pupils are equal, round, and reactive to light.   Cardiovascular:      Rate and Rhythm: Normal rate. Rhythm irregular.      Pulses: Normal  pulses.      Heart sounds: Normal heart sounds. No murmur heard.     No friction rub. No gallop.   Pulmonary:      Effort: Pulmonary effort is normal. No respiratory distress.      Breath sounds: Normal breath sounds. No stridor. No wheezing, rhonchi or rales.   Abdominal:      General: Abdomen is flat. There is no distension.      Palpations: Abdomen is soft.      Tenderness: There is no abdominal tenderness. There is no guarding.   Musculoskeletal:         General: No swelling. Normal range of motion.      Cervical back: Normal range of motion. No rigidity or tenderness.      Right lower leg: No edema.      Left lower leg: No edema.   Lymphadenopathy:      Cervical: No cervical adenopathy.   Skin:     General: Skin is warm and dry.      Coloration: Skin is not pale.      Findings: Lesion present. No rash.   Neurological:      General: No focal deficit present.      Mental Status: He is alert and oriented to person, place, and time. Mental status is at baseline.      Cranial Nerves: No cranial nerve deficit.      Sensory: Sensory deficit present.   Psychiatric:         Mood and Affect: Mood normal.         Behavior: Behavior normal.         Thought Content: Thought content normal.         Judgment: Judgment normal.             Assessment:      Active Hospital Problems    Diagnosis  POA    *Osteomyelitis [M86.9]  Yes    Thrombocytosis [D75.839]  Yes    Anemia [D64.9]  Yes    Chronic indwelling Sams catheter [Z97.8]  Not Applicable    Sacral wound [S31.000A]  Yes    Stage 3b chronic kidney disease [N18.32]  Yes    Paroxysmal atrial fibrillation [I48.0]  Yes     Chronic    Wound of left leg [S81.802A]  Yes    Type 2 diabetes mellitus, with long-term current use of insulin [E11.9, Z79.4]  Not Applicable    Essential hypertension [I10]  Yes     Chronic      Resolved Hospital Problems   No resolved problems to display.       Plan:      Chronic Osteomyelitis of R heel  L hip wound infection, Proteus  MRSA  history  History of Carbapenem resistant Pseudomonas   - Recent history of IV Vanc and Zoysn 7/25  - extensive grade 3-4 sacral wounds, L hip wound purulence  - blood cultures no growth x5 days  - CXR shows coarse interstitial attenuation, similar to prior  - WBC elevated 23.2 9/30, trending down, 9.37  - hip wound cultures reveal proteus mirabilis, sensitive to Zoysn  - continue oxycodone and tylenol PRN for pain  - Gen Surgery signing off, performed drainage of L hip abscess and debridement of sacral wound 10/1, rec no further intervention  - Podiatry following, MRI revealed acute osteomyelitis of the posterior 2/3 of the calcaneous and early osteo of the lateral malleolus and 1st distal phalanx, recommending medial tx, no planned intervention  - MRI pelvis revealed b/l osteomyelitis of the posterior iliac bones.  - R foot cultures show gram neg rods and staphylococcus caprae, pending sensitivities   - Id consulted, recommends to continue IV Zoysn and Daptomycin   - plan for long-term antibiotic use to treat osteo upon d/c     Paroxysmal Atrial Fibrillation on Eliquis   Pacemaker   PVD  CHF  - echo on 7/2024 showed EF of 55-60% with no significant valvular abnormalities   - follows with Vascular Surgeon Dr. Banda  - continue home Lasix and hold HCTZ, some UE edema  - electrolytes stable, trend CMP  - continue cardiac monitoring   - resumed home Eliquis, stable H&H      Anemia of Chronic Disease   Acute Anemia   - H&H 7.3 and 25.3  - received 1 unit of PRBCs 10/2 and 1 unit 10/3  - transfused 1 unit of PRBCs in ED  - hold iron supplement      Below Knee Amputation 3/24  - due to nonhealing L foot wound   - well-healed scar   - patient is non-ambulatory      Chronic Sams  Suspected Urinary Tract Infection  - UA shows +1 blood, 3+ leuk esterase, >100 WBCs and few bacteria, patient reports episode of dysuria yesterday and suprapubic pain on palpation   - culture shows no growth x5 days, completed        Type 2  Diabetes Mellitus on Insulin   - Hb A1c on 7/25 was 6  - continue insulin sliding scale      CKD3  - hold nephrotoxic medications  - continue Lasix at this time, hold HCTZ  - renally dose Vanc     HTN  - continuing home Lasix      Hypoalbuminemia   - albumin 1.4     DVT Ppx: continue home Eliquis   Disposition: awaiting cultures and ID recs for antibiotics

## 2024-10-06 NOTE — ASSESSMENT & PLAN NOTE
Patient with Paroxysmal (<7 days) atrial fibrillation which is uncontrolled. Patient is currently in atrial fibrillation.      echo on 7/2024 showed EF of 55-60% with no significant valvular abnormalities   - follows with Vascular Surgeon Dr. Banda  - hold home Lasix and HCTZ, not volume overloaded   - electrolytes stable, trend CMP  - hold home Eliquis due to unstable H&H, trend CBC  - monitor Afib, continuous telemetry   - Restarted home Eliquis, currently no further plans for surgical intervention

## 2024-10-06 NOTE — ASSESSMENT & PLAN NOTE
Anemia is likely due to chronic disease due to Chronic Kidney Disease. Most recent hemoglobin and hematocrit are listed below.  Recent Labs     10/04/24  0308 10/05/24  0531 10/06/24  0522   HGB 7.5* 7.7* 7.3*   HCT 24.1* 25.9* 25.3*     Plan  - Monitor serial CBC: daily  - Transfuse PRBC if patient becomes hemodynamically unstable, symptomatic or H/H drops below 7/21.  - Patient has received 1 units of PRBCs on 9/29  & 1 unit pRBCs on 10/3  - Patient's anemia is currently   7.1, below baseline 8-9  - Likely multifactorial with AOCD, as well as potentially GI bleed vs  AVM. Continue to monitor  - H&H 6.3 and 21.6   - hold iron supplement   - guaic + stool test  - Elevated ferritin, Anemia 2/2 chronic disease

## 2024-10-06 NOTE — PROGRESS NOTES
[facility] ([place] [address])  [facility]  Progress Note    Patient Name: Saji Castañeda  MRN: [id number]  Patient Class: IP- Inpatient   Admission Date: 9/29/2024  Length of Stay: 6 days  Attending Physician: Seymour Betancourt MD  Primary Care Provider: Vinod Elise MD        Subjective:     Principal Problem:Osteomyelitis        HPI:  Saji Castañeda is a 74 y.o. 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 Arellano, Chronic Multifactorial Anemia, BPH s/p Chronic Sams, paroxysmal A-fib on eliquis, CHB s/p PPM medtronic (10/12/2023), CKD3, Multifactorial HTN, HLD and Osteomyelitis of R foot presenting from [facility] with suspected infection of L hip wound. He is nonambulatory . Patient has chronic, extensive sacral wounds, a R heel wound and a wound on the L hip with purulence and a foul odor. The patient was recommended to come to the ED due to concerns of wound infection by nursing staff at NH. Patient reports intermittent pain at the wound sites that is controlled with Oxycodone. Patient states that wound care manages these sites at the nursing home. Patient is alert, oriented and responsive to questioning however he is a poor historian regarding his medical history. Per Chart Review the patient was admitted to the ED on 7/25 for AMS while on Vanc and Cefepime for Osteomyelitis. Cefepime neurotoxicity was suspected and therapy was switched to Vanc and Zosyn. He then developed  He was transferred to [facility] at that time and had a pacemaker placed. Patient reports currently being on oral antibiotic therapy but is unsure what he is taking. He reports taking his medications daily that the nurse at NH provides. He reports never smoking or using drugs and no longer drinks alcohol.      Patient reports chronic Sams cath usage for unknown reason that was last changed today. He reports an episode of  dysuria yesterday and endorses suprapubic pain on palpation. He denies hematuria, urinary frequency/urgency, urinary odor or abdominal pain.      He also reports a mild cough and chills for the past several weeks with scant sputum production but denies fever, chest pain, SOB or recent illness. He reports chronic diaphoresis at night that requires him to change clothes in the morning.      Overview/Hospital Course:   74 y.o. 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 Arellano, Chronic Multifactorial Anemia, BPH s/p Chronic Sams, paroxysmal A-fib on eliquis, CHB s/p PPM medtronic (10/12/2023), CKD3, Multifactorial HTN, HLD and Osteomyelitis of R foot presenting from [facility] with suspected infection of sacral wound. Started on Vanc and Zosyn given prior history of prior susceptibilities. General surgery plans for wound debridement 10/1. Wound care and podiatry following. X-ray of right foot c/f chronic osteomyelitis. MRI with acute osteomyelitis of the posterior 2/3 of the calcaneus and early osteomyelitis of the lateral malleolus and 1st distal phalanx. R heel bone biopsy and debridement done by podiatry on 10/3. R foot bone culture positive for staph caprae and gram negative mikey species, susceptibilities pending.    Interval History: NAEON. VSS. Right food wound culture positive staph caprae and gram negative mikey. Patient reports 10/10 pain on buttocks. He denies any new concerns.     Review of Systems   Constitutional:  Negative for appetite change.   Respiratory:  Negative for shortness of breath.    Cardiovascular:  Negative for chest pain.   Gastrointestinal:  Positive for abdominal pain. Negative for abdominal distention, diarrhea, nausea and vomiting.   Skin:  Positive for wound (painful sacral wound).     Objective:     Vital Signs (Most Recent):  Temp: 98.2 °F (36.8 °C) (10/06/24 1221)  Pulse: 79  (10/06/24 1221)  Resp: 18 (10/06/24 1221)  BP: (!) 144/67 (10/06/24 1221)  SpO2: 95 % (10/06/24 1221) Vital Signs (24h Range):  Temp:  [97.6 °F (36.4 °C)-98.7 °F (37.1 °C)] 98.2 °F (36.8 °C)  Pulse:  [74-83] 79  Resp:  [12-18] 18  SpO2:  [94 %-100 %] 95 %  BP: (101-144)/(55-72) 144/67     Weight: 83.4 kg (183 lb 13.8 oz)  Body mass index is 27.15 kg/m².    Intake/Output Summary (Last 24 hours) at 10/6/2024 1221  Last data filed at 10/6/2024 0608  Gross per 24 hour   Intake 820.88 ml   Output 1800 ml   Net -979.12 ml         Physical Exam  Constitutional:       General: He is not in acute distress.     Appearance: He is ill-appearing. He is not diaphoretic.   HENT:      Head: Normocephalic and atraumatic.      Right Ear: External ear normal.      Left Ear: External ear normal.      Mouth/Throat:      Mouth: Mucous membranes are moist.   Eyes:      General:         Right eye: No discharge.         Left eye: No discharge.      Extraocular Movements: Extraocular movements intact.   Cardiovascular:      Rate and Rhythm: Normal rate. Rhythm irregular.      Heart sounds: No murmur heard.  Pulmonary:      Effort: Pulmonary effort is normal. No respiratory distress.      Breath sounds: No wheezing.   Abdominal:      General: Abdomen is flat. There is no distension.      Tenderness: Tenderness: RLQ tenderness.   Musculoskeletal:      Cervical back: Normal range of motion.      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 Surgical scar well-healed  Right lower extremity with cracked, dry skin as well as right heel wound  R heel dressing in tact      Skin:     General: Skin is dry.   Neurological:      General: No focal deficit present.      Mental Status: He is alert.             Significant Labs: All pertinent labs within the past 24 hours have been reviewed.    Significant Imaging: I have reviewed all pertinent imaging results/findings within the past 24 hours.    Assessment/Plan:      * Osteomyelitis  R heal OM  diagnosed during admission 6/26-7/11 with bone cx positive for pseudomonas and MRSA. Discharged on vanc and cefepime for 6 week course.  Recent admission 8/2-8/6, at that time Cefepime discontinued due to c/f Cefepime neurotoxicity, then developed Vanc YNES, and discharged on Daptomycin and Zosyn  - extensive grade 3-4 sacral wounds, L hip wound purulence  - chronic osteomyelitis of R heel with nonhealing ulcer    - Wound Cx from L buttocks, positive for proteus susceptible zosyn  - Wound Cx from R foot Aerobic culture, positive for staph species and GNR  - Blood Cx NGTD  - started on IV Vanc in the ED, recommend renally dosing  - continue Vanc 1750 mg q12h   - continue Zosyn  - Wound care consulted, recommended podiatry consult, providing   - WBC elevated 13.6, likely due to wound infection  - trend CBC and CMP  - procalcitonin 0.12  - continue oxycodone and tylenol PRN for pain  - per pharmacy, obtain vanc trough on 10/01 at 6:00 before giving 4th dose, range 10-20 mcg/mL  - podiatry consulted, performed bone biopsy and debridement of the R heel on 10/3.   - MRI R foot ordered , c/f acute osteomyelitis of 2/3 calcaneus and early osteomyelitis of lateral malleolus & 1st distal phalanx  - MRI pelvis ordered, Bilateral osteomyelitis of the posterior iliac bones  - ID consulted, rec to continue Daptomycin & Zosyn  - f/u culture susceptbilities    Anemia  Anemia is likely due to chronic disease due to Chronic Kidney Disease. Most recent hemoglobin and hematocrit are listed below.  Recent Labs     10/04/24  0308 10/05/24  0531 10/06/24  0522   HGB 7.5* 7.7* 7.3*   HCT 24.1* 25.9* 25.3*     Plan  - Monitor serial CBC: daily  - Transfuse PRBC if patient becomes hemodynamically unstable, symptomatic or H/H drops below 7/21.  - Patient has received 1 units of PRBCs on 9/29  & 1 unit pRBCs on 10/3  - Patient's anemia is currently   7.1, below baseline 8-9  - Likely multifactorial with AOCD, as well as potentially GI bleed vs   AVM. Continue to monitor  - H&H 6.3 and 21.6   - hold iron supplement   - guaic + stool test  - Elevated ferritin, Anemia 2/2 chronic disease      Chronic indwelling Fung catheter  Pt has failed multiple void trials in the past, now with chronic fung. Most recently replaced 9/29. C/f UTI  UA shows +1 blood, 3+ leuk esterase, >100 WBCs and few bacteria, patient reports episode of dysuria yesterday and suprapubic pain on palpation   - Ucx, NGTD          Sacral wound  Left buttocks wound cx: proteus positive, susceptibles pending    - MRI pelvis ordered, Bilateral osteomyelitis of the posterior iliac bones.   - Wound Cx positive for proteus    Stage 3b chronic kidney disease  Creatine stable for now. BMP reviewed- noted Estimated Creatinine Clearance: 58 mL/min (based on SCr of 1.1 mg/dL). according to latest data. Based on current GFR, CKD stage is stage 3 - GFR 30-59.  Monitor UOP and serial BMP and adjust therapy as needed. Renally dose meds. Avoid nephrotoxic medications and procedures.    - hold nephrotoxic medications  - hold Lasix at this time   - renally dose Vanc    Paroxysmal atrial fibrillation  Patient with Paroxysmal (<7 days) atrial fibrillation which is uncontrolled. Patient is currently in atrial fibrillation.      echo on 7/2024 showed EF of 55-60% with no significant valvular abnormalities   - follows with Vascular Surgeon Dr. Banda  - hold home Lasix and HCTZ, not volume overloaded   - electrolytes stable, trend CMP  - hold home Eliquis due to unstable H&H, trend CBC  - monitor Afib, continuous telemetry   - Restarted home Eliquis, currently no further plans for surgical intervention    Wound of left leg  Left hip wound with purulent foul smelling drainage    - Wound cx ordered, f/u results  - On Daptomycin/Zosyn  - Gen surg consulted for debridement ,completed  beside debridement of L hip and sacral wound on 10/1          Type 2 diabetes mellitus, with long-term current use of insulin   Hb A1c  on 7/25 was 6. Home Lantus 17 u BID  - Lantus 12 units BID, increased from 8 units BID due to elevated glucose  - continue LDSSI   - POCT glucose checks before meals and nightly      Essential hypertension  Chronic, uncontrolled. Latest blood pressure and vitals reviewed-     Temp:  [97.6 °F (36.4 °C)-98.7 °F (37.1 °C)]   Pulse:  [74-83]   Resp:  [12-18]   BP: (101-144)/(55-72)   SpO2:  [94 %-100 %] .   Home meds for hypertension were reviewed and noted below.   Hypertension Medications               furosemide (LASIX) 20 MG tablet Take 1 tablet (20 mg total) by mouth once daily.    NIFEdipine (PROCARDIA-XL) 90 MG (OSM) 24 hr tablet Take 1 tablet (90 mg total) by mouth once daily.            While in the hospital, will manage blood pressure as follows; Adjust home antihypertensive regimen as follows- held home Nifedipine    Will utilize p.r.n. blood pressure medication only if patient's blood pressure greater than 180/110 and he develops symptoms such as worsening chest pain or shortness of breath.     - hold diuretics, no signs of volume overload           VTE Risk Mitigation (From admission, onward)           Ordered     apixaban tablet 5 mg  2 times daily         10/05/24 1554     Reason for No Pharmacological VTE Prophylaxis  Once        Question:  Reasons:  Answer:  Risk of Bleeding  Comment:  c/f bleeding with drop in Hgb    09/29/24 1914     IP VTE HIGH RISK PATIENT  Once         09/29/24 1914     Place sequential compression device  Until discontinued         09/29/24 1914                    Discharge Planning   OXANA: 10/7/2024     Code Status: Full Code   Is the patient medically ready for discharge?:     Reason for patient still in hospital (select all that apply): Treatment  Discharge Plan A: Skilled Nursing Facility   Discharge Delays: None known at this time              Anita Thomas MD  Department of Hospital Medicine   [facility] ([place])

## 2024-10-06 NOTE — ASSESSMENT & PLAN NOTE
R heal OM diagnosed during admission 6/26-7/11 with bone cx positive for pseudomonas and MRSA. Discharged on vanc and cefepime for 6 week course.  Recent admission 8/2-8/6, at that time Cefepime discontinued due to c/f Cefepime neurotoxicity, then developed Vanc YNES, and discharged on Daptomycin and Zosyn  - extensive grade 3-4 sacral wounds, L hip wound purulence  - chronic osteomyelitis of R heel with nonhealing ulcer    - Wound Cx from L buttocks, positive for proteus susceptible zosyn  - Wound Cx from R foot Aerobic culture, positive for staph species and GNR  - Blood Cx NGTD  - started on IV Vanc in the ED, recommend renally dosing  - continue Vanc 1750 mg q12h   - continue Zosyn  - Wound care consulted, recommended podiatry consult, providing   - WBC elevated 13.6, likely due to wound infection  - trend CBC and CMP  - procalcitonin 0.12  - continue oxycodone and tylenol PRN for pain  - per pharmacy, obtain vanc trough on 10/01 at 6:00 before giving 4th dose, range 10-20 mcg/mL  - podiatry consulted, performed bone biopsy and debridement of the R heel on 10/3.   - MRI R foot ordered , c/f acute osteomyelitis of 2/3 calcaneus and early osteomyelitis of lateral malleolus & 1st distal phalanx  - MRI pelvis ordered, Bilateral osteomyelitis of the posterior iliac bones  - ID consulted, rec to continue Daptomycin & Zosyn  - f/u culture susceptbilities

## 2024-10-06 NOTE — PLAN OF CARE
Patient AAOx4, VSS on RA. One complaint of pain overnight. Safety precautions maintained and call light in reach.      Problem: Pain Acute  Goal: Optimal Pain Control and Function  Outcome: Progressing

## 2024-10-07 LAB
ALBUMIN SERPL BCP-MCNC: 1.5 G/DL (ref 3.5–5.2)
ALP SERPL-CCNC: 135 U/L (ref 55–135)
ALT SERPL W/O P-5'-P-CCNC: 17 U/L (ref 10–44)
ANION GAP SERPL CALC-SCNC: 7 MMOL/L (ref 8–16)
AST SERPL-CCNC: 40 U/L (ref 10–40)
BACTERIA SPEC ANAEROBE CULT: NORMAL
BASOPHILS # BLD AUTO: 0.07 K/UL (ref 0–0.2)
BASOPHILS NFR BLD: 0.6 % (ref 0–1.9)
BILIRUB SERPL-MCNC: 0.2 MG/DL (ref 0.1–1)
BUN SERPL-MCNC: 14 MG/DL (ref 8–23)
CALCIUM SERPL-MCNC: 8.7 MG/DL (ref 8.7–10.5)
CHLORIDE SERPL-SCNC: 108 MMOL/L (ref 95–110)
CO2 SERPL-SCNC: 23 MMOL/L (ref 23–29)
CREAT SERPL-MCNC: 0.9 MG/DL (ref 0.5–1.4)
CRP SERPL-MCNC: 76.8 MG/L (ref 0–8.2)
DIFFERENTIAL METHOD BLD: ABNORMAL
EOSINOPHIL # BLD AUTO: 0.6 K/UL (ref 0–0.5)
EOSINOPHIL NFR BLD: 5.7 % (ref 0–8)
ERYTHROCYTE [DISTWIDTH] IN BLOOD BY AUTOMATED COUNT: 17.4 % (ref 11.5–14.5)
EST. GFR  (NO RACE VARIABLE): >60 ML/MIN/1.73 M^2
FINAL PATHOLOGIC DIAGNOSIS: NORMAL
GLUCOSE SERPL-MCNC: 119 MG/DL (ref 70–110)
GROSS: NORMAL
HCT VFR BLD AUTO: 30.5 % (ref 40–54)
HGB BLD-MCNC: 8.4 G/DL (ref 14–18)
IMM GRANULOCYTES # BLD AUTO: 0.06 K/UL (ref 0–0.04)
IMM GRANULOCYTES NFR BLD AUTO: 0.5 % (ref 0–0.5)
LYMPHOCYTES # BLD AUTO: 1.5 K/UL (ref 1–4.8)
LYMPHOCYTES NFR BLD: 12.9 % (ref 18–48)
Lab: NORMAL
MAGNESIUM SERPL-MCNC: 1.8 MG/DL (ref 1.6–2.6)
MCH RBC QN AUTO: 25.9 PG (ref 27–31)
MCHC RBC AUTO-ENTMCNC: 27.5 G/DL (ref 32–36)
MCV RBC AUTO: 94 FL (ref 82–98)
MONOCYTES # BLD AUTO: 0.7 K/UL (ref 0.3–1)
MONOCYTES NFR BLD: 6.3 % (ref 4–15)
NEUTROPHILS # BLD AUTO: 8.3 K/UL (ref 1.8–7.7)
NEUTROPHILS NFR BLD: 74 % (ref 38–73)
NRBC BLD-RTO: 0 /100 WBC
PHOSPHATE SERPL-MCNC: 3.3 MG/DL (ref 2.7–4.5)
PLATELET # BLD AUTO: 556 K/UL (ref 150–450)
PMV BLD AUTO: 8.5 FL (ref 9.2–12.9)
POCT GLUCOSE: 163 MG/DL (ref 70–110)
POCT GLUCOSE: 166 MG/DL (ref 70–110)
POCT GLUCOSE: 196 MG/DL (ref 70–110)
POTASSIUM SERPL-SCNC: 5 MMOL/L (ref 3.5–5.1)
PROT SERPL-MCNC: 7 G/DL (ref 6–8.4)
RBC # BLD AUTO: 3.24 M/UL (ref 4.6–6.2)
SODIUM SERPL-SCNC: 138 MMOL/L (ref 136–145)
WBC # BLD AUTO: 11.21 K/UL (ref 3.9–12.7)

## 2024-10-07 PROCEDURE — 80053 COMPREHEN METABOLIC PANEL: CPT | Mod: HCNC

## 2024-10-07 PROCEDURE — 25000003 PHARM REV CODE 250: Mod: HCNC | Performed by: PHYSICIAN ASSISTANT

## 2024-10-07 PROCEDURE — 27000207 HC ISOLATION: Mod: HCNC

## 2024-10-07 PROCEDURE — 25000003 PHARM REV CODE 250: Mod: HCNC

## 2024-10-07 PROCEDURE — 83735 ASSAY OF MAGNESIUM: CPT | Mod: HCNC

## 2024-10-07 PROCEDURE — 86140 C-REACTIVE PROTEIN: CPT | Mod: HCNC

## 2024-10-07 PROCEDURE — 20600001 HC STEP DOWN PRIVATE ROOM: Mod: HCNC

## 2024-10-07 PROCEDURE — 36415 COLL VENOUS BLD VENIPUNCTURE: CPT | Mod: HCNC

## 2024-10-07 PROCEDURE — 63600175 PHARM REV CODE 636 W HCPCS: Mod: HCNC

## 2024-10-07 PROCEDURE — 63600175 PHARM REV CODE 636 W HCPCS: Mod: HCNC | Performed by: PHYSICIAN ASSISTANT

## 2024-10-07 PROCEDURE — 84100 ASSAY OF PHOSPHORUS: CPT | Mod: HCNC

## 2024-10-07 PROCEDURE — 99233 SBSQ HOSP IP/OBS HIGH 50: CPT | Mod: HCNC,,, | Performed by: INTERNAL MEDICINE

## 2024-10-07 PROCEDURE — 85025 COMPLETE CBC W/AUTO DIFF WBC: CPT | Mod: HCNC

## 2024-10-07 RX ADMIN — OXYCODONE HYDROCHLORIDE AND ACETAMINOPHEN 500 MG: 500 TABLET ORAL at 09:10

## 2024-10-07 RX ADMIN — HYDROMORPHONE HYDROCHLORIDE 0.2 MG: 1 INJECTION, SOLUTION INTRAMUSCULAR; INTRAVENOUS; SUBCUTANEOUS at 05:10

## 2024-10-07 RX ADMIN — APIXABAN 5 MG: 5 TABLET, FILM COATED ORAL at 09:10

## 2024-10-07 RX ADMIN — POLYETHYLENE GLYCOL 3350 17 G: 17 POWDER, FOR SOLUTION ORAL at 09:10

## 2024-10-07 RX ADMIN — ZINC SULFATE 220 MG (50 MG) CAPSULE 220 MG: CAPSULE at 09:10

## 2024-10-07 RX ADMIN — PIPERACILLIN SODIUM AND TAZOBACTAM SODIUM 4.5 G: 4; .5 INJECTION, POWDER, FOR SOLUTION INTRAVENOUS at 02:10

## 2024-10-07 RX ADMIN — INSULIN GLARGINE 12 UNITS: 100 INJECTION, SOLUTION SUBCUTANEOUS at 09:10

## 2024-10-07 RX ADMIN — PIPERACILLIN SODIUM AND TAZOBACTAM SODIUM 4.5 G: 4; .5 INJECTION, POWDER, FOR SOLUTION INTRAVENOUS at 09:10

## 2024-10-07 RX ADMIN — TAMSULOSIN HYDROCHLORIDE 0.4 MG: 0.4 CAPSULE ORAL at 09:10

## 2024-10-07 RX ADMIN — OXYCODONE HYDROCHLORIDE 10 MG: 5 TABLET ORAL at 09:10

## 2024-10-07 RX ADMIN — SENNOSIDES 8.6 MG: 8.6 TABLET, FILM COATED ORAL at 09:10

## 2024-10-07 RX ADMIN — OXYCODONE HYDROCHLORIDE 10 MG: 5 TABLET ORAL at 04:10

## 2024-10-07 RX ADMIN — GABAPENTIN 300 MG: 300 CAPSULE ORAL at 09:10

## 2024-10-07 RX ADMIN — ACETAMINOPHEN 650 MG: 325 TABLET ORAL at 01:10

## 2024-10-07 RX ADMIN — ACETAMINOPHEN 650 MG: 325 TABLET ORAL at 09:10

## 2024-10-07 RX ADMIN — ACETAMINOPHEN 650 MG: 325 TABLET ORAL at 05:10

## 2024-10-07 RX ADMIN — DAPTOMYCIN 665 MG: 350 INJECTION, POWDER, LYOPHILIZED, FOR SOLUTION INTRAVENOUS at 01:10

## 2024-10-07 RX ADMIN — THERA TABS 1 TABLET: TAB at 09:10

## 2024-10-07 RX ADMIN — FUROSEMIDE 20 MG: 20 TABLET ORAL at 09:10

## 2024-10-07 RX ADMIN — PIPERACILLIN SODIUM AND TAZOBACTAM SODIUM 4.5 G: 4; .5 INJECTION, POWDER, FOR SOLUTION INTRAVENOUS at 05:10

## 2024-10-07 NOTE — PROGRESS NOTES
[facility] ([place])  Infectious Disease  Progress Note    Patient Name: Saji Castañeda  MRN: [id number]  Admission Date: 9/29/2024  Length of Stay: 7 days  Attending Physician: Seymour Betancourt MD  Primary Care Provider: Vinod Elise MD    Isolation Status: Contact  Assessment/Plan:      ID  * Osteomyelitis  75 yo male admitted with R heel wound with MRI showing calcaneal osteo and pelvic wounds.  L hip wound debrided and cultures show proteus m. Per gen sx, L hip wound does not probe to bone. POD following.      10/03/2024: Afebrile and WBC WNL.  Per general surgery note, left hip wound still with purulence.  Podiatry performed bone biopsy 10/03/2024 on right heel.  Patient reports slight subjective improvement.  Patient had been previously treated for right heel osteo ending 08/12/2024 by [facility] for MRSA and  Pseudomonas infection.  Possible that R foot MRI findings are residual from prior infection and artifact.    10/4/24: Stable.  MRI pelvis with osteomyelitis.  R heel bone biopsy done.  Cultures pending.  Gen Sx signed off re: sacral/L hip wounds    10/7/24: Stable.  R heel bone with Staph caprae and pan sensitive Pseudomonas.  L hip wound cx with proteus M - FQ resistant - no anaerobic cx done.  CRP down to 76 from 114    Plan:  Continue daptomycin and zosyn  FU calcaneal biopsy culture sensitivities.  Trend CRP  PICC placement and plan for long term abx  Rec O SNF/LTAC  Will follow  Seen with ID staff        Anticipated Disposition: tbd    Thank you for your consult. I will follow-up with patient. Please contact us if you have any additional questions.    JOHNNY Goel  Infectious Disease  [facility] ([place])    Subjective:     Principal Problem:Osteomyelitis    HPI: Saji Castañeda is a 74 y.o. 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 Arellano,anemia, BPH s/p Chronic Sams,  paroxysmal A-fib on eliquis, CHB s/p PPM medtronic (10/12/2023), CKD3, HTN, HLD and Osteomyelitis of R heel presenting from [facility] with suspected infection of L hip wound. He is nonambulatory . Patient has chronic, extensive sacral wounds, a R heel wound and a wound on the L hip with purulence and a foul odor. The patient was recommended to come to the ED due to concerns of wound infection by nursing staff at NH. Patient reports intermittent pain at the wound sites that is controlled with Oxycodone.  Notably the patient was admitted to the ED on 7/25 for AMS while on Vanc and Cefepime for Osteomyelitis. Cefepime neurotoxicity was suspected and therapy was switched to Vanc and Zosyn then Dapto/Zosyn with and EOC of 8/12/24.      He has been seen by gen sx and L hip debrided and cultures sent showing proteus m.  UCX - NG and blood cultures NGTD.  Podiatry following for R foot and MRI R foot shows: 1. Acute osteomyelitis of the posterior 2/3 of the calcaneus with a large overlying soft tissue ulcer that involves the distal insertional fibers of the Achilles tendon. 2. Early osteomyelitis of the lateral malleolus and 1st distal phalanx with overlying soft tissue ulcers. He has been treated with Zosyn.  ID consulted for abx recs.    Interval History:   No acute events  Afebrile and white blood cell count normal  Blood cultures no growth to date  L hip wound culture - Proteus mirabilis  Right heel bone culture - Staph caprea/Pseudomonas  Sacral and pain slightly improved  MRI pelvis with BL ischial osteo  Denies fevers chills sweats    Review of Systems   Constitutional:  Negative for chills, diaphoresis and fever.   Respiratory:  Negative for shortness of breath.    Cardiovascular:  Negative for chest pain.   Gastrointestinal:  Negative for abdominal pain, diarrhea, nausea and vomiting.   Genitourinary:  Negative for dysuria and hematuria.   Skin:  Positive for wound.     Objective:     Vital Signs (Most  Recent):  Temp: 98.7 °F (37.1 °C) (10/07/24 0356)  Pulse: 79 (10/07/24 0356)  Resp: 15 (10/07/24 0547)  BP: (!) 104/58 (10/07/24 0356)  SpO2: 100 % (10/07/24 0356) Vital Signs (24h Range):  Temp:  [98.2 °F (36.8 °C)-99.1 °F (37.3 °C)] 98.7 °F (37.1 °C)  Pulse:  [75-89] 79  Resp:  [15-20] 15  SpO2:  [95 %-100 %] 100 %  BP: (104-145)/(54-67) 104/58     Weight: 83.4 kg (183 lb 13.8 oz)  Body mass index is 27.15 kg/m².    Estimated Creatinine Clearance: 70.9 mL/min (based on SCr of 0.9 mg/dL).     Physical Exam  Constitutional:       General: He is not in acute distress.     Appearance: He is well-developed. He is ill-appearing. He is not toxic-appearing or diaphoretic.       HENT:      Head: Normocephalic and atraumatic.   Cardiovascular:      Rate and Rhythm: Normal rate and regular rhythm.      Heart sounds: Normal heart sounds. No murmur heard.     No friction rub. No gallop.   Pulmonary:      Effort: Pulmonary effort is normal. No respiratory distress.      Breath sounds: Normal breath sounds. No wheezing or rales.   Abdominal:      General: Bowel sounds are normal. There is no distension.      Palpations: Abdomen is soft. There is no mass.      Tenderness: There is no abdominal tenderness. There is no guarding or rebound.   Skin:     General: Skin is warm and dry.   Neurological:      Mental Status: He is alert and oriented to person, place, and time.   Psychiatric:         Behavior: Behavior normal.       10/4/24 above                       Significant Labs: Blood Culture:   Recent Labs   Lab 06/28/24  2251 06/28/24  2252 07/25/24 2114 07/25/24 2115 09/29/24  1446   LABBLOO No growth after 5 days. No growth after 5 days. No growth after 5 days. No growth after 5 days. No growth after 5 days.  No growth after 5 days.     CBC:   Recent Labs   Lab 10/06/24  0522   WBC 9.37   HGB 7.3*   HCT 25.3*   *     CMP:   Recent Labs   Lab 10/06/24  0522      K 3.8      CO2 25   *   BUN 15    CREATININE 0.9   CALCIUM 8.5*   PROT 6.3   ALBUMIN 1.4*   BILITOT 0.1   ALKPHOS 99   AST 18   ALT 15   ANIONGAP 8     Urine Culture:   Recent Labs   Lab 05/08/24  2121 05/19/24  2102 07/25/24  1257 09/29/24  1722   LABURIN PROVIDENCIA STUARTII  >100,000 cfu/ml  * CANDIDA GLABRATA  10,000 - 49,999 cfu/ml  Treatment of asymptomatic candiduria is not recommended (except for   specific populations). Candida isolated in the urine typically   represents colonization. If an indwelling urinary catheter is present  it should be removed or replaced.  * No growth No growth     Urine Studies:   Recent Labs   Lab 08/05/24  1515 09/29/24  1722   COLORU Yellow Colorless*   APPEARANCEUA Hazy* Clear   PHUR 6.0 6.0   SPECGRAV 1.015 1.010   PROTEINUA 1+* Negative   GLUCUA Negative Negative   KETONESU Negative Negative   BILIRUBINUA Negative Negative   OCCULTUA 1+* 1+*   NITRITE Negative Negative   UROBILINOGEN Negative  --    LEUKOCYTESUR 3+* 3+*   RBCUA 25* 15*   WBCUA >100* >100*   BACTERIA Occasional Few*   SQUAMEPITHEL  --  1   HYALINECASTS 0  --      Wound Culture:   Recent Labs   Lab 07/01/24  1241 09/29/24  1628 10/03/24  1303   LABAERO METHICILLIN RESISTANT STAPHYLOCOCCUS AUREUS  Few  *  PSEUDOMONAS AERUGINOSA   Few  * PROTEUS MIRABILIS  Few  Skin bossman also present  * STAPHYLOCOCCUS CAPRAE  Few  Susceptibility pending  *  PSEUDOMONAS AERUGINOSA  Few  Skin bossman also present  *     All pertinent labs within the past 24 hours have been reviewed.    Significant Imaging: I have reviewed all pertinent imaging results/findings within the past 24 hours.  MRI Midfoot WO Contrast RT [[id number]] (Abnormal) Resulted: 10/01/24 1210   Order Status: Completed Updated: 10/01/24 1213   Narrative:     EXAMINATION:  MRI FOREFOOT WO CONTRAST RT; MRI HINDFOOT WO CONTRAST RT; MRI MIDFOOT WO CONTRAST RT    CLINICAL HISTORY:  Osteomyelitis, foot;    TECHNIQUE:  MRI of the entire right foot with multiplanar and multisequence imaging.  No  intravenous contrast was administered.    COMPARISON:  Right foot radiograph 09/30/2024, right foot CT 06/28/2024, and right foot MRI 05/16/2023.    FINDINGS:  Exam limited by motion artifact.    BONES: Confluent marrow edema throughout the posterior 2/3 of the calcaneus with corresponding T1 medullary hypointensity and suspected posterior cortical irregularity.  Comfort marrow edema throughout the lateral half of the lateral malleolus with grossly preserved cortical margins and T1 medullary signal.  Confluent marrow edema throughout the 1st distal phalanx with preserved T1 medullary signal and cortical margins.  Contour irregularity of the 5th metatarsal shaft, possibly sequela of a healed fracture or remote infection.  No acute fractures.  No avascular necrosis.    JOINTS: Scattered hindfoot, midfoot and forefoot osteoarthritis.  No subluxation or dislocation.  No joint effusions.    TENDONS: Full-thickness, partial width tearing of the insertional fibers of the Achilles tendon.  Posterior tibial, flexor digitorum longus, flexor hallucis longus, peroneus longus, peroneus brevis and extensor tendons appear grossly intact.    MUSCLES: Severe fatty degeneration and patchy edema of the visualized musculature, likely sequela of chronic denervation:    MISCELLANEOUS: Large soft tissue ulcer overlying the posterior and lateral aspects of the calcaneus.  Soft tissue ulceration overlying the lateral malleolus and 1st distal phalanx.  Generalized subcutaneous edema most pronounced along the dorsum of the foot.  No fluid collection.  Chronic thickening of the plantar aponeurosis.   Impression:       1. Acute osteomyelitis of the posterior 2/3 of the calcaneus with a large overlying soft tissue ulcer that involves the distal insertional fibers of the Achilles tendon.  2. Early osteomyelitis of the lateral malleolus and 1st distal phalanx with overlying soft tissue ulcers.  3. Additional findings, as above.  This report was  flagged in Epic as abnormal.    Electronically signed by resident: Raegan Melvin  Date: 10/01/2024  Time: 11:26    Electronically signed by: Erik Hart MD  Date: 10/01/2024  Time: 12:10   MRI Hindfoot WO Contrast RT [[id number]] (Abnormal) Resulted: 10/01/24 1210   Order Status: Completed Updated: 10/01/24 1213   Narrative:     EXAMINATION:  MRI FOREFOOT WO CONTRAST RT; MRI HINDFOOT WO CONTRAST RT; MRI MIDFOOT WO CONTRAST RT    CLINICAL HISTORY:  Osteomyelitis, foot;    TECHNIQUE:  MRI of the entire right foot with multiplanar and multisequence imaging.  No intravenous contrast was administered.    COMPARISON:  Right foot radiograph 09/30/2024, right foot CT 06/28/2024, and right foot MRI 05/16/2023.    FINDINGS:  Exam limited by motion artifact.    BONES: Confluent marrow edema throughout the posterior 2/3 of the calcaneus with corresponding T1 medullary hypointensity and suspected posterior cortical irregularity.  Comfort marrow edema throughout the lateral half of the lateral malleolus with grossly preserved cortical margins and T1 medullary signal.  Confluent marrow edema throughout the 1st distal phalanx with preserved T1 medullary signal and cortical margins.  Contour irregularity of the 5th metatarsal shaft, possibly sequela of a healed fracture or remote infection.  No acute fractures.  No avascular necrosis.    JOINTS: Scattered hindfoot, midfoot and forefoot osteoarthritis.  No subluxation or dislocation.  No joint effusions.    TENDONS: Full-thickness, partial width tearing of the insertional fibers of the Achilles tendon.  Posterior tibial, flexor digitorum longus, flexor hallucis longus, peroneus longus, peroneus brevis and extensor tendons appear grossly intact.    MUSCLES: Severe fatty degeneration and patchy edema of the visualized musculature, likely sequela of chronic denervation:    MISCELLANEOUS: Large soft tissue ulcer overlying the posterior and lateral aspects of the calcaneus.  Soft tissue  ulceration overlying the lateral malleolus and 1st distal phalanx.  Generalized subcutaneous edema most pronounced along the dorsum of the foot.  No fluid collection.  Chronic thickening of the plantar aponeurosis.   Impression:       1. Acute osteomyelitis of the posterior 2/3 of the calcaneus with a large overlying soft tissue ulcer that involves the distal insertional fibers of the Achilles tendon.  2. Early osteomyelitis of the lateral malleolus and 1st distal phalanx with overlying soft tissue ulcers.  3. Additional findings, as above.  This report was flagged in Epic as abnormal.    Electronically signed by resident: Raegan Melvin  Date: 10/01/2024  Time: 11:26    Electronically signed by: Erik Hart MD  Date: 10/01/2024  Time: 12:10   MRI Forefoot WO Contrast RT [[id number]] (Abnormal) Resulted: 10/01/24 1210   Order Status: Completed Updated: 10/01/24 1213   Narrative:     EXAMINATION:  MRI FOREFOOT WO CONTRAST RT; MRI HINDFOOT WO CONTRAST RT; MRI MIDFOOT WO CONTRAST RT    CLINICAL HISTORY:  Osteomyelitis, foot;    TECHNIQUE:  MRI of the entire right foot with multiplanar and multisequence imaging.  No intravenous contrast was administered.    COMPARISON:  Right foot radiograph 09/30/2024, right foot CT 06/28/2024, and right foot MRI 05/16/2023.    FINDINGS:  Exam limited by motion artifact.    BONES: Confluent marrow edema throughout the posterior 2/3 of the calcaneus with corresponding T1 medullary hypointensity and suspected posterior cortical irregularity.  Comfort marrow edema throughout the lateral half of the lateral malleolus with grossly preserved cortical margins and T1 medullary signal.  Confluent marrow edema throughout the 1st distal phalanx with preserved T1 medullary signal and cortical margins.  Contour irregularity of the 5th metatarsal shaft, possibly sequela of a healed fracture or remote infection.  No acute fractures.  No avascular necrosis.    JOINTS: Scattered hindfoot, midfoot and  forefoot osteoarthritis.  No subluxation or dislocation.  No joint effusions.    TENDONS: Full-thickness, partial width tearing of the insertional fibers of the Achilles tendon.  Posterior tibial, flexor digitorum longus, flexor hallucis longus, peroneus longus, peroneus brevis and extensor tendons appear grossly intact.    MUSCLES: Severe fatty degeneration and patchy edema of the visualized musculature, likely sequela of chronic denervation:    MISCELLANEOUS: Large soft tissue ulcer overlying the posterior and lateral aspects of the calcaneus.  Soft tissue ulceration overlying the lateral malleolus and 1st distal phalanx.  Generalized subcutaneous edema most pronounced along the dorsum of the foot.  No fluid collection.  Chronic thickening of the plantar aponeurosis.   Impression:       1. Acute osteomyelitis of the posterior 2/3 of the calcaneus with a large overlying soft tissue ulcer that involves the distal insertional fibers of the Achilles tendon.  2. Early osteomyelitis of the lateral malleolus and 1st distal phalanx with overlying soft tissue ulcers.  3. Additional findings, as above.  This report was flagged in Epic as abnormal.    Electronically signed by resident: Raegan Melvin  Date: 10/01/2024  Time: 11:26    Electronically signed by: Erik Hart MD  Date: 10/01/2024  Time: 12:10   US Lower Extremity Arteries Bilateral [[id number]] Resulted: 09/30/24 1712   Order Status: Completed Updated: 09/30/24 1715   Narrative:     EXAMINATION:  US LOWER EXTREMITY ARTERIES BILATERAL    CLINICAL HISTORY:  wound healing;    TECHNIQUE:  Bilateral spectral, color and grayscale images of the large arteries of both lower extremities were performed.    COMPARISON:  Multiple ultrasounds, most recent 07/26/2024    FINDINGS:  Right lower extremity: Peak systolic velocity in the right lower extremity arterial system measures 99 cm/sec in the anterior tibial artery.  No evidence of a hemodynamically significant stenosis.   There are triphasic waveforms throughout most of the right lower extremity.  There are monophasic waveforms in the posterior tibial artery.    Left lower extremity: History of above the knee amputation.  Peak systolic velocity in the left lower extremity arterial system measures 80 cm/sec in the deep femoral artery.  No evidence of a hemodynamically significant stenosis.  There are triphasic and biphasic waveforms throughout most of the left lower extremity.  There are monophasic waveforms in the distal femoral artery.    Miscellaneous: Arrhythmia.  Prominent right inguinal lymph node measuring 0.9 cm short axis, presumably reactive.   Impression:       Left above the knee amputation.    No hemodynamically significant arterial stenosis in either lower extremity.    Arrhythmia.  Correlate with EKG.      Electronically signed by: Rishi Ramos  Date: 09/30/2024  Time: 17:12   X-Ray Foot Complete Right [[id number]] (Abnormal) Resulted: 09/30/24 1426   Order Status: Completed Updated: 09/30/24 1429   Narrative:     EXAMINATION:  XR FOOT COMPLETE 3 VIEW RIGHT    CLINICAL HISTORY:  R heel wound;.    TECHNIQUE:  AP, lateral, and oblique views of the right foot were performed.    COMPARISON:  06/28/2024    FINDINGS:  Three views right foot.    There is osteopenia.  There is remote injury of the 5th metatarsal.  There is motion blurring.  There is questionable periosteal reaction about the distal aspect of the 5th metatarsal versus motion blurring.  There is edema about the foot.  There are degenerative changes of the calcaneus as well as of the dorsal foot.  There is vascular calcification.  There is skin wound overlying the calcaneus.   Impression:       This report was flagged in Epic as abnormal.    1. Questionable indistinctness about the distal aspect of the 5th metatarsal, may reflect subacute injury to the region however infectious process not excluded.  Please note, evaluation is limited given motion blurring in the  region.  Correlation is advised.  2. Diffuse edema about the foot noting skin wound at the level of the calcaneus.  Correlation is needed to exclude exposed bone.  3. There appears to be increased erosive change of the posterior aspect of the calcaneus since the previous examination.  Underlying or chronic osteomyelitis not excluded.      Electronically signed by: Milton Dove MD  Date: 09/30/2024  Time: 14:26   X-Ray Chest AP Portable [[id number]] Resulted: 09/29/24 1759   Order Status: Completed Updated: 09/29/24 1801   Narrative:     EXAMINATION:  XR CHEST AP PORTABLE    CLINICAL HISTORY:  wound infection;    TECHNIQUE:  Single frontal view of the chest was performed.    COMPARISON:  07/25/2024    FINDINGS:  The cardiomediastinal silhouette is not enlarged.  There is elevation of the right hemidiaphragm..  There is no pleural effusion.  The trachea is midline.  The lungs are symmetrically expanded bilaterally with coarse interstitial attenuation.  No large focal consolidation seen.  There is no pneumothorax.  The osseous structures are remarkable for degenerative change..   Impression:       Course interstitial attenuation, similar to the prior exam.  Superimposed edema is a consideration.      Electronically signed by: Milton Dove MD  Date: 09/29/2024  Time: 17:59      Imaging History     2024    Date Procedure Name Study Review Link PACS Link Status Accession Number Location   10/03/24 10:22 AM Cardiac monitoring strips Study Review  Final     10/02/24 07:55 PM Cardiac monitoring strips Study Review  Final     10/02/24 03:09 PM Cardiac monitoring strips Study Review  Final     10/01/24 10:22 PM Cardiac monitoring strips Study Review  Final     10/01/24 11:23 AM MRI Hindfoot WO Contrast RT Study Review  Images Final [id number] [facility]   10/01/24 11:23 AM MRI Midfoot WO Contrast RT Study Review  Images Final [id number] [facility]   10/01/24 11:23 AM MRI Forefoot WO Contrast RT Study Review  Images Final [id number]  [facility]   10/01/24 09:44 AM Cardiac monitoring strips Study Review  Final     09/30/24 11:22 PM Cardiac monitoring strips Study Review  Final     09/30/24 05:36 PM Cardiac monitoring strips Study Review  Final     09/30/24 04:54 PM US Lower Extremity Arteries Bilateral Study Review  Images Final [id number] [facility]   09/30/24 01:10 PM X-Ray Foot Complete Right Study Review  Images Final [id number] [facility]   09/29/24 10:35 PM Cardiac monitoring strips Study Review  Final     09/29/24 05:10 PM X-Ray Chest AP Portable Study Review  Images Final [id number] [facility]

## 2024-10-07 NOTE — SUBJECTIVE & OBJECTIVE
Interval History: NAEON. Patient reports feeling well this morning but is complaining of sacral wound pain. He is tolerating diet without issue. Remains afebrile, HDS, and on room air. Culture data pending sensitivities for S Caprae and PsA and Proteus. Awaiting final antibiotic recommendations per ID and PICC placement, anticipated discharge tomorrow vs 10/9.    Review of Systems   Constitutional:  Negative for activity change, appetite change, chills and fever.   Respiratory:  Negative for cough and shortness of breath.    Gastrointestinal:  Negative for abdominal pain, diarrhea, nausea and vomiting.   Genitourinary:  Negative for dysuria.   Musculoskeletal:  Positive for myalgias.   Skin:  Positive for wound.   Neurological:  Negative for dizziness and headaches.     Objective:     Vital Signs (Most Recent):  Temp: 98.7 °F (37.1 °C) (10/07/24 0356)  Pulse: 83 (10/07/24 1120)  Resp: 20 (10/07/24 0931)  BP: (!) 104/58 (10/07/24 0356)  SpO2: 100 % (10/07/24 0356) Vital Signs (24h Range):  Temp:  [98.2 °F (36.8 °C)-99.1 °F (37.3 °C)] 98.7 °F (37.1 °C)  Pulse:  [75-89] 83  Resp:  [15-20] 20  SpO2:  [95 %-100 %] 100 %  BP: (104-145)/(54-67) 104/58     Weight: 83.4 kg (183 lb 13.8 oz)  Body mass index is 27.15 kg/m².    Intake/Output Summary (Last 24 hours) at 10/7/2024 1135  Last data filed at 10/7/2024 0429  Gross per 24 hour   Intake --   Output 1000 ml   Net -1000 ml         Physical Exam  Constitutional:       General: He is not in acute distress.     Appearance: He is ill-appearing. He is not diaphoretic.   HENT:      Head: Normocephalic and atraumatic.   Eyes:      General: No scleral icterus.        Right eye: No discharge.         Left eye: No discharge.      Extraocular Movements: Extraocular movements intact.   Cardiovascular:      Rate and Rhythm: Normal rate. Rhythm irregular.      Heart sounds: No murmur heard.  Pulmonary:      Effort: Pulmonary effort is normal. No respiratory distress.   Abdominal:       General: Abdomen is flat. There is no distension.   Musculoskeletal:      Cervical back: Normal range of motion.      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 Surgical scar well-healed  Right lower extremity with cracked, dry skin as well as right heel wound  R heel dressing in tact      Skin:     General: Skin is dry.   Neurological:      General: No focal deficit present.      Mental Status: He is alert.             Significant Labs: All pertinent labs within the past 24 hours have been reviewed.    Significant Imaging: I have reviewed all pertinent imaging results/findings within the past 24 hours.

## 2024-10-07 NOTE — PLAN OF CARE
Problem: Skin Injury Risk Increased  Goal: Skin Health and Integrity  Outcome: Progressing     Problem: Pain Acute  Goal: Optimal Pain Control and Function  Outcome: Progressing     Problem: Fall Injury Risk  Goal: Absence of Fall and Fall-Related Injury  Outcome: Progressing

## 2024-10-07 NOTE — ASSESSMENT & PLAN NOTE
R heal OM diagnosed during admission 6/26-7/11 with bone cx positive for pseudomonas and MRSA. Discharged on vanc and cefepime for 6 week course.  Recent admission 8/2-8/6, at that time Cefepime discontinued due to c/f Cefepime neurotoxicity, then developed Vanc YNES, and discharged on Daptomycin and Zosyn  - extensive grade 3-4 sacral wounds, L hip wound purulence  - chronic osteomyelitis of R heel with nonhealing ulcer    - Wound Cx from L buttocks, positive for proteus susceptible zosyn  - Wound Cx from R foot Aerobic culture, positive for staph species and GNR  - Blood Cx NGTD  - started on IV Vanc in the ED, recommend renally dosing  - continue Vanc 1750 mg q12h   - continue Zosyn  - Wound care consulted, recommended podiatry consult, providing   - WBC elevated 13.6, likely due to wound infection  - trend CBC and CMP  - procalcitonin 0.12  - continue oxycodone and tylenol PRN for pain  - per pharmacy, obtain vanc trough on 10/01 at 6:00 before giving 4th dose, range 10-20 mcg/mL  - podiatry consulted, performed bone biopsy and debridement of the R heel on 10/3.   - MRI R foot ordered , c/f acute osteomyelitis of 2/3 calcaneus and early osteomyelitis of lateral malleolus & 1st distal phalanx  - MRI pelvis ordered, Bilateral osteomyelitis of the posterior iliac bones  - ID consulted, rec to continue Daptomycin & Zosyn ; awaiting final antibiotic recommendations and PICC placement  - f/u culture susceptbilities

## 2024-10-07 NOTE — PLAN OF CARE
Discharge Plan A and Plan B have been determined by review of patient's clinical status, future medical and therapeutic needs, and coverage/benefits for post-acute care in coordination with multidisciplinary team members.      10/07/24 0919   Discharge Reassessment   Assessment Type Discharge Planning Reassessment   Did the patient's condition or plan change since previous assessment? No   Discharge Plan discussed with: Sibling   Name(s) and Number(s) January Nicholson (Sister)  [phone] (Mobile)   Communicated OXANA with patient/caregiver Yes   Discharge Plan A Skilled Nursing Facility   DME Needed Upon Discharge  none   Transition of Care Barriers Mobility   Why the patient remains in the hospital Requires continued medical care   Post-Acute Status   Post-Acute Authorization Placement   Post-Acute Placement Status Pending payor review/awaiting authorization (if required)  ([employer] MANAGED MEDICARE - [employer] SNP HMO PPO SPECIAL NEEDS - CAPITATED)   Hospital Resources/Appts/Education Provided Appointments scheduled and added to AVS   Patient choice form signed by patient/caregiver List from CMS Compare;List with quality metrics by geographic area provided   Discharge Delays None known at this time     CM spoke with patients sister Rochelle Nicholson [phone]   and  to discuss discharge planning.  Patients plan is to  return to [facility]    OXANA:  10/9/24    1109 am  CM received a call from Bryce @ [place] and SNF authorization has been approved. LEW updated Bryce on current OXANA of 10/09/24          TREATMENT PLANS:   Osteomyelitis     R heal OM diagnosed during admission 6/26-7/11 with bone cx positive for pseudomonas and MRSA   Wound care consulted, recommended podiatry consult, providing   MRI R foot ordered , c/f acute osteomyelitis of 2/3 calcaneus and early osteomyelitis of lateral malleolus & 1st distal phalanx  - MRI pelvis ordered, Bilateral osteomyelitis of the posterior iliac bones  - ID consulted,  rec to continue Daptomycin & Zosyn ; awaiting final antibiotic recommendations and PICC placement  - f/u culture susceptibilities    Follow up: facility PCP      Referrals: SIDNEY Alex RN  Case Management  [facility]  [phone]

## 2024-10-07 NOTE — MEDICAL/APP STUDENT
[facility] - Stepdown Flex ([facility]-)  History & Physical    Subjective:      Chief Complaint/Reason for Admission: Osteomyelitis     Saji Castañeda is a 74 y.o. male Saji Castañeda is a 74 y.o. male  w/ hx of DM, PAD s/p L AKA 3/2024, chronic anemia, chronic fung, paroxysmal Afib on apixaban, CHB s/p PM 10/2023, CKD, HTN, R foot osteo presented from [facility] for L hip wound prurulent drainage. On admission patient had a leukocytosis, he had a recent admission with micro notable for  pseudomonas (zosyn sensitive) and MRSA.      Hospital Course  Patient reports feeling sore from debridement of R foot 10/3 and sacral wounds 10/1 but notes that pain is improved from yesterday. L hip wound cultures postive for Proteus Mirabilis. Gen surg recommended no further surgical intervention and signed off.  MRI of R foot, which revealed acute osteomyelitis. MRI pelvis revealed b/l osteomyelitis of the posterior iliac bones.  R foot cultures show pan sensitive pseudomonas and Staphylococcus Caprae, pending sensitives. Id consulted, recommends to continue IV Zoysn and Daptomycin and plan for long-term antibiotic use to treat osteo upon d/c.  ID and Podiatry following, pending recs from ID, continuing Zosyn and Daptomycin at this time. Resumed home Eliquis.    Past Medical History:   Diagnosis Date    Arthritis     legs    Bacteremia due to Gram-negative bacteria 3/23/2021    Diabetes mellitus     Diabetes mellitus, type 2     Hyperlipidemia     Hypertension     Osteomyelitis     Palliative care encounter 5/24/2023     Past Surgical History:   Procedure Laterality Date    ABOVE-KNEE AMPUTATION Left 3/27/2024    Procedure: AMPUTATION, ABOVE KNEE;  Surgeon: Adal Arellano MD;  Location: [facility] OR [address] [place] [address];  Service: Vascular;  Laterality: Left;    ANGIOGRAPHY OF LOWER EXTREMITY N/A 2/3/2021    Procedure: Angiogram Extremity Bilateral;  Surgeon: Ernst Chacko MD;  Location: [facility] OR [address] [place] [address];  Service:  "Peripheral Vascular;  Laterality: N/A;  7.4 mintues fluoroscopy time  816.15 mGy  170.17 Gycm2    AORTOGRAPHY WITH EXTREMITY RUNOFF Bilateral 2/3/2021    Procedure: AORTOGRAM, WITH EXTREMITY RUNOFF;  Surgeon: Ernst Chacko MD;  [facility];  Service: Peripheral Vascular;  Laterality: Bilateral;    DEBRIDEMENT OF FOOT Left 2/23/2021    Procedure: DEBRIDEMENT, LEFT HEEL;  Surgeon: Mayra Schroeder DPM;  [facility];  Service: Podiatry;  Laterality: Left;    ESOPHAGOGASTRODUODENOSCOPY N/A 6/28/2024    Procedure: EGD (ESOPHAGOGASTRODUODENOSCOPY);  Surgeon: Adal Chandra MD;  Location: [facility] ENDO;  Service: Gastroenterology;  Laterality: N/A;    FOOT AMPUTATION  October 2010    left high midfoot amputation    IMPLANTATION OF LEADLESS PACEMAKER N/A 10/12/2023    Procedure: [id number];  Surgeon: VANESSA Delatorre MD;  Location: [facility] EP LAB;  Service: Cardiology;  Laterality: N/A;  AVB, MICRA, EH, ANES, [place] [id number]     Social History     Tobacco Use    Smoking status: Never    Smokeless tobacco: Never   Substance Use Topics    Alcohol use: No     Comment: occassional    Drug use: No       PTA Medications   Medication Sig    acetaminophen (TYLENOL) 325 MG tablet Take 650 mg by mouth every 6 (six) hours as needed for Pain.    amoxicillin-clavulanate 875-125mg (AUGMENTIN) 875-125 mg per tablet Take 1 tablet by mouth 2 (two) times daily.    apixaban (ELIQUIS) 5 mg Tab Take 1 tablet (5 mg total) by mouth 2 (two) times daily.    ascorbic acid, vitamin C, (VITAMIN C) 500 MG tablet Take 500 mg by mouth 2 (two) times daily.    BD ULTRA-FINE ADITYA PEN NEEDLE 32 gauge x 5/32" Ndle USE FOUR TIMES DAILY WITH MEALS AND NIGHTLY    blood sugar diagnostic (CONTOUR TEST STRIPS) Strp 1 strip by Misc.(Non-Drug; Combo Route) route 4 (four) times daily. Use to check blood glucose 4x daily    diphenhydrAMINE (BENADRYL) 25 mg capsule Take 1 capsule (25 mg total) by mouth every 6 " (six) hours as needed for Itching.    [] ergocalciferol (ERGOCALCIFEROL) 50,000 unit Cap Take 1 capsule (50,000 Units total) by mouth Every Friday. for 10 doses    ferrous sulfate 325 (65 FE) MG EC tablet Take 325 mg by mouth 2 (two) times daily.    furosemide (LASIX) 20 MG tablet Take 1 tablet (20 mg total) by mouth once daily.    gabapentin (NEURONTIN) 300 MG capsule Take 300 mg by mouth 2 (two) times daily.    insulin aspart U-100 (NOVOLOG) 100 unit/mL (3 mL) InPn pen Inject 5 Units into the skin 3 (three) times daily with meals.    insulin aspart U-100 (NOVOLOG) 100 unit/mL injection Inject 2-10 Units into the skin 3 (three) times daily before meals. 0-149=0  150-200: 2 units  201-250: 4 units  251-300: 6 units  301-350: 8 units  351-1000=10 units, NOTIFY MD    lancets (MICROLET LANCET) Misc 1 each by Misc.(Non-Drug; Combo Route) route 4 (four) times daily. Use to check blood sugars 4x daily    LANTUS SOLOSTAR U-100 INSULIN 100 unit/mL (3 mL) InPn pen Inject 17 Units into the skin 2 (two) times a day.    menthol-zinc oxide (CALMOSEPTINE) 0.44-20.6 % Oint Apply topically as needed.    multivitamin (THERAGRAN) per tablet Take 1 tablet by mouth once daily.    NIFEdipine (PROCARDIA-XL) 90 MG (OSM) 24 hr tablet Take 1 tablet (90 mg total) by mouth once daily.    nutritional supplements Powd Take 1 packet by mouth 2 (two) times a day.    oxyCODONE (ROXICODONE) 5 MG immediate release tablet Take 1 tablet (5 mg total) by mouth every 24 hours as needed for Pain. (Patient taking differently: Take 5 mg by mouth every 6 (six) hours as needed for Pain.)    pantoprazole (PROTONIX) 40 MG tablet Take 40 mg by mouth once daily. Before breakfast    potassium chloride SA (K-DUR,KLOR-CON) 20 MEQ tablet Take 20 mEq by mouth 2 (two) times daily.    sodium hypochlorite (DAKINS) external solution Apply to affected area topically one time per day    tamsulosin (FLOMAX) 0.4 mg Cap Take 0.4 mg by mouth once daily.    TRUE METRIX  GLUCOSE METER Misc     zinc sulfate (ZINCATE) 50 mg zinc (220 mg) capsule Take 220 mg by mouth every morning.     Review of patient's allergies indicates:  No Known Allergies     Review of Systems   Constitutional:  Negative for chills, diaphoresis and fever.   HENT:  Negative for congestion, sore throat and tinnitus.    Eyes:  Negative for blurred vision, double vision, photophobia, discharge and redness.   Respiratory:  Negative for cough, hemoptysis, shortness of breath, wheezing and stridor.    Cardiovascular:  Negative for chest pain, palpitations and leg swelling.   Gastrointestinal:  Negative for abdominal pain, blood in stool, heartburn, nausea and vomiting.   Genitourinary:  Negative for dysuria, frequency, hematuria and urgency.   Musculoskeletal:  Positive for back pain. Negative for myalgias and neck pain.   Skin:  Negative for itching and rash.   Neurological:  Negative for dizziness, weakness and headaches.   Endo/Heme/Allergies:  Negative for environmental allergies and polydipsia.   Psychiatric/Behavioral:  Negative for depression and hallucinations.        Objective:      Vital Signs (Most Recent)  Temp: 98.1 °F (36.7 °C) (10/07/24 1159)  Pulse: 80 (10/07/24 1159)  Resp: 18 (10/07/24 1159)  BP: (!) 116/55 (10/07/24 1159)  SpO2: 100 % (10/07/24 1159)    Vital Signs Range (Last 24H):  Temp:  [98.1 °F (36.7 °C)-99.1 °F (37.3 °C)]   Pulse:  [75-89]   Resp:  [15-20]   BP: (104-145)/(54-62)   SpO2:  [97 %-100 %]     Physical Exam  Constitutional:       General: He is not in acute distress.     Appearance: Normal appearance. He is normal weight. He is not ill-appearing or toxic-appearing.   HENT:      Head: Normocephalic and atraumatic.      Nose: No congestion or rhinorrhea.   Eyes:      General: No scleral icterus.     Pupils: Pupils are equal, round, and reactive to light.   Cardiovascular:      Rate and Rhythm: Normal rate and regular rhythm.      Pulses: Normal pulses.      Heart sounds: Normal heart  sounds. No murmur heard.     No friction rub. No gallop.   Pulmonary:      Effort: Pulmonary effort is normal. No respiratory distress.      Breath sounds: Normal breath sounds. No stridor. No wheezing or rales.   Abdominal:      General: Abdomen is flat. There is no distension.      Palpations: Abdomen is soft.      Tenderness: There is no abdominal tenderness. There is no guarding.   Musculoskeletal:         General: Deformity present. No swelling. Normal range of motion.      Cervical back: Normal range of motion. No rigidity or tenderness.      Right lower leg: No edema.      Left lower leg: No edema.   Lymphadenopathy:      Cervical: No cervical adenopathy.   Skin:     General: Skin is warm and dry.      Capillary Refill: Capillary refill takes less than 2 seconds.      Coloration: Skin is not jaundiced.      Findings: Lesion present. No bruising or erythema.   Neurological:      General: No focal deficit present.      Mental Status: He is alert and oriented to person, place, and time. Mental status is at baseline.      Cranial Nerves: No cranial nerve deficit.      Motor: No weakness.      Gait: Gait normal.   Psychiatric:         Mood and Affect: Mood normal.         Behavior: Behavior normal.         Thought Content: Thought content normal.         Judgment: Judgment normal.             Assessment:      Active Hospital Problems    Diagnosis  POA    *Osteomyelitis [M86.9]  Yes    Thrombocytosis [D75.839]  Yes    Anemia [D64.9]  Yes    Chronic indwelling Sams catheter [Z97.8]  Not Applicable    Sacral wound [S31.000A]  Yes    Stage 3b chronic kidney disease [N18.32]  Yes    Paroxysmal atrial fibrillation [I48.0]  Yes     Chronic    Wound of left leg [S81.802A]  Yes    Type 2 diabetes mellitus, with long-term current use of insulin [E11.9, Z79.4]  Not Applicable    Essential hypertension [I10]  Yes     Chronic      Resolved Hospital Problems   No resolved problems to display.       Plan:    Chronic  Osteomyelitis of R heel  L hip wound infection, Proteus  MRSA history  History of Carbapenem resistant Pseudomonas   - Recent history of IV Vanc and Zoysn 7/25  - extensive grade 3-4 sacral wounds, L hip wound purulence  - blood cultures no growth x5 days  - CXR shows coarse interstitial attenuation, similar to prior  - WBC elevated 23.2 9/30, trending down, 9.37  - hip wound cultures reveal proteus mirabilis, sensitive to Zoysn  - continue oxycodone and tylenol PRN for pain  - Gen Surgery signing off, performed drainage of L hip abscess and debridement of sacral wound 10/1, rec no further intervention  - Podiatry following, MRI revealed acute osteomyelitis of the posterior 2/3 of the calcaneous and early osteo of the lateral malleolus and 1st distal phalanx, recommending medial tx, no planned intervention  - MRI pelvis revealed b/l osteomyelitis of the posterior iliac bones.  - R foot cultures show pan sensitive pseudomonas and Staphylococcus Caprae, pending sensitives.  - Id consulted, recommends to continue IV Zoysn and Daptomycin for now, pending final recs.   - plan for long-term antibiotic use to treat osteo upon d/c  - consult placed for PICC line     Paroxysmal Atrial Fibrillation on Eliquis   Pacemaker   PVD  CHF  - echo on 7/2024 showed EF of 55-60% with no significant valvular abnormalities   - follows with Vascular Surgeon Dr. Banda  - continue home Lasix and hold HCTZ, some UE edema  - electrolytes stable, trend CMP  - continue cardiac monitoring   - resumed home Eliquis, stable H&H      Anemia of Chronic Disease   Acute Anemia   - H&H 8.4 and 30.5  - received 1 unit of PRBCs 10/2 and 1 unit 10/3  - transfused 1 unit of PRBCs in ED  - hold iron supplement      Below Knee Amputation 3/24  - due to nonhealing L foot wound   - well-healed scar   - patient is non-ambulatory      Chronic Sams  Suspected Urinary Tract Infection  - UA shows +1 blood, 3+ leuk esterase, >100 WBCs and few bacteria, patient  reports episode of dysuria yesterday and suprapubic pain on palpation   - culture shows no growth x5 days, completed        Type 2 Diabetes Mellitus on Insulin   - Hb A1c on 7/25 was 6  - continue insulin sliding scale      CKD3  - hold nephrotoxic medications  - continue Lasix at this time, hold HCTZ  - renally dose Vanc     HTN  - continuing home Lasix      Hypoalbuminemia   - albumin 1.4     DVT Ppx: continue home Eliquis   Disposition: awaiting sensitivities and ID recs for antibiotics

## 2024-10-07 NOTE — SUBJECTIVE & OBJECTIVE
Interval History:   No acute events  Afebrile and white blood cell count normal  Blood cultures no growth to date  L hip wound culture - Proteus mirabilis  Right heel bone culture - Staph caprea/Pseudomonas  Sacral and pain slightly improved  MRI pelvis with BL ischial osteo  Denies fevers chills sweats    Review of Systems   Constitutional:  Negative for chills, diaphoresis and fever.   Respiratory:  Negative for shortness of breath.    Cardiovascular:  Negative for chest pain.   Gastrointestinal:  Negative for abdominal pain, diarrhea, nausea and vomiting.   Genitourinary:  Negative for dysuria and hematuria.   Skin:  Positive for wound.     Objective:     Vital Signs (Most Recent):  Temp: 98.7 °F (37.1 °C) (10/07/24 0356)  Pulse: 79 (10/07/24 0356)  Resp: 15 (10/07/24 0547)  BP: (!) 104/58 (10/07/24 0356)  SpO2: 100 % (10/07/24 0356) Vital Signs (24h Range):  Temp:  [98.2 °F (36.8 °C)-99.1 °F (37.3 °C)] 98.7 °F (37.1 °C)  Pulse:  [75-89] 79  Resp:  [15-20] 15  SpO2:  [95 %-100 %] 100 %  BP: (104-145)/(54-67) 104/58     Weight: 83.4 kg (183 lb 13.8 oz)  Body mass index is 27.15 kg/m².    Estimated Creatinine Clearance: 70.9 mL/min (based on SCr of 0.9 mg/dL).     Physical Exam  Constitutional:       General: He is not in acute distress.     Appearance: He is well-developed. He is ill-appearing. He is not toxic-appearing or diaphoretic.       HENT:      Head: Normocephalic and atraumatic.   Cardiovascular:      Rate and Rhythm: Normal rate and regular rhythm.      Heart sounds: Normal heart sounds. No murmur heard.     No friction rub. No gallop.   Pulmonary:      Effort: Pulmonary effort is normal. No respiratory distress.      Breath sounds: Normal breath sounds. No wheezing or rales.   Abdominal:      General: Bowel sounds are normal. There is no distension.      Palpations: Abdomen is soft. There is no mass.      Tenderness: There is no abdominal tenderness. There is no guarding or rebound.   Skin:     General:  Skin is warm and dry.   Neurological:      Mental Status: He is alert and oriented to person, place, and time.   Psychiatric:         Behavior: Behavior normal.       10/4/24 above                       Significant Labs: Blood Culture:   Recent Labs   Lab 06/28/24  2251 06/28/24 2252 07/25/24 2114 07/25/24 2115 09/29/24  1446   LABBLOO No growth after 5 days. No growth after 5 days. No growth after 5 days. No growth after 5 days. No growth after 5 days.  No growth after 5 days.     CBC:   Recent Labs   Lab 10/06/24  0522   WBC 9.37   HGB 7.3*   HCT 25.3*   *     CMP:   Recent Labs   Lab 10/06/24  0522      K 3.8      CO2 25   *   BUN 15   CREATININE 0.9   CALCIUM 8.5*   PROT 6.3   ALBUMIN 1.4*   BILITOT 0.1   ALKPHOS 99   AST 18   ALT 15   ANIONGAP 8     Urine Culture:   Recent Labs   Lab 05/08/24 2121 05/19/24 2102 07/25/24  1257 09/29/24  1722   LABURIN PROVIDENCIA STUARTII  >100,000 cfu/ml  * CANDIDA GLABRATA  10,000 - 49,999 cfu/ml  Treatment of asymptomatic candiduria is not recommended (except for   specific populations). Candida isolated in the urine typically   represents colonization. If an indwelling urinary catheter is present  it should be removed or replaced.  * No growth No growth     Urine Studies:   Recent Labs   Lab 08/05/24  1515 09/29/24  1722   COLORU Yellow Colorless*   APPEARANCEUA Hazy* Clear   PHUR 6.0 6.0   SPECGRAV 1.015 1.010   PROTEINUA 1+* Negative   GLUCUA Negative Negative   KETONESU Negative Negative   BILIRUBINUA Negative Negative   OCCULTUA 1+* 1+*   NITRITE Negative Negative   UROBILINOGEN Negative  --    LEUKOCYTESUR 3+* 3+*   RBCUA 25* 15*   WBCUA >100* >100*   BACTERIA Occasional Few*   SQUAMEPITHEL  --  1   HYALINECASTS 0  --      Wound Culture:   Recent Labs   Lab 07/01/24  1241 09/29/24  1628 10/03/24  1303   LABAERO METHICILLIN RESISTANT STAPHYLOCOCCUS AUREUS  Few  *  PSEUDOMONAS AERUGINOSA   Few  * PROTEUS MIRABILIS  Few  Skin bossman also  present  * STAPHYLOCOCCUS CAPRAE  Few  Susceptibility pending  *  PSEUDOMONAS AERUGINOSA  Few  Skin bossman also present  *     All pertinent labs within the past 24 hours have been reviewed.    Significant Imaging: I have reviewed all pertinent imaging results/findings within the past 24 hours.  MRI Midfoot WO Contrast RT [[id number]] (Abnormal) Resulted: 10/01/24 1210   Order Status: Completed Updated: 10/01/24 1213   Narrative:     EXAMINATION:  MRI FOREFOOT WO CONTRAST RT; MRI HINDFOOT WO CONTRAST RT; MRI MIDFOOT WO CONTRAST RT    CLINICAL HISTORY:  Osteomyelitis, foot;    TECHNIQUE:  MRI of the entire right foot with multiplanar and multisequence imaging.  No intravenous contrast was administered.    COMPARISON:  Right foot radiograph 09/30/2024, right foot CT 06/28/2024, and right foot MRI 05/16/2023.    FINDINGS:  Exam limited by motion artifact.    BONES: Confluent marrow edema throughout the posterior 2/3 of the calcaneus with corresponding T1 medullary hypointensity and suspected posterior cortical irregularity.  Comfort marrow edema throughout the lateral half of the lateral malleolus with grossly preserved cortical margins and T1 medullary signal.  Confluent marrow edema throughout the 1st distal phalanx with preserved T1 medullary signal and cortical margins.  Contour irregularity of the 5th metatarsal shaft, possibly sequela of a healed fracture or remote infection.  No acute fractures.  No avascular necrosis.    JOINTS: Scattered hindfoot, midfoot and forefoot osteoarthritis.  No subluxation or dislocation.  No joint effusions.    TENDONS: Full-thickness, partial width tearing of the insertional fibers of the Achilles tendon.  Posterior tibial, flexor digitorum longus, flexor hallucis longus, peroneus longus, peroneus brevis and extensor tendons appear grossly intact.    MUSCLES: Severe fatty degeneration and patchy edema of the visualized musculature, likely sequela of chronic  denervation:    MISCELLANEOUS: Large soft tissue ulcer overlying the posterior and lateral aspects of the calcaneus.  Soft tissue ulceration overlying the lateral malleolus and 1st distal phalanx.  Generalized subcutaneous edema most pronounced along the dorsum of the foot.  No fluid collection.  Chronic thickening of the plantar aponeurosis.   Impression:       1. Acute osteomyelitis of the posterior 2/3 of the calcaneus with a large overlying soft tissue ulcer that involves the distal insertional fibers of the Achilles tendon.  2. Early osteomyelitis of the lateral malleolus and 1st distal phalanx with overlying soft tissue ulcers.  3. Additional findings, as above.  This report was flagged in Epic as abnormal.    Electronically signed by resident: Raegan Melvin  Date: 10/01/2024  Time: 11:26    Electronically signed by: Erik Hart MD  Date: 10/01/2024  Time: 12:10   MRI Hindfoot WO Contrast RT [[id number]] (Abnormal) Resulted: 10/01/24 1210   Order Status: Completed Updated: 10/01/24 1213   Narrative:     EXAMINATION:  MRI FOREFOOT WO CONTRAST RT; MRI HINDFOOT WO CONTRAST RT; MRI MIDFOOT WO CONTRAST RT    CLINICAL HISTORY:  Osteomyelitis, foot;    TECHNIQUE:  MRI of the entire right foot with multiplanar and multisequence imaging.  No intravenous contrast was administered.    COMPARISON:  Right foot radiograph 09/30/2024, right foot CT 06/28/2024, and right foot MRI 05/16/2023.    FINDINGS:  Exam limited by motion artifact.    BONES: Confluent marrow edema throughout the posterior 2/3 of the calcaneus with corresponding T1 medullary hypointensity and suspected posterior cortical irregularity.  Comfort marrow edema throughout the lateral half of the lateral malleolus with grossly preserved cortical margins and T1 medullary signal.  Confluent marrow edema throughout the 1st distal phalanx with preserved T1 medullary signal and cortical margins.  Contour irregularity of the 5th metatarsal shaft, possibly sequela  of a healed fracture or remote infection.  No acute fractures.  No avascular necrosis.    JOINTS: Scattered hindfoot, midfoot and forefoot osteoarthritis.  No subluxation or dislocation.  No joint effusions.    TENDONS: Full-thickness, partial width tearing of the insertional fibers of the Achilles tendon.  Posterior tibial, flexor digitorum longus, flexor hallucis longus, peroneus longus, peroneus brevis and extensor tendons appear grossly intact.    MUSCLES: Severe fatty degeneration and patchy edema of the visualized musculature, likely sequela of chronic denervation:    MISCELLANEOUS: Large soft tissue ulcer overlying the posterior and lateral aspects of the calcaneus.  Soft tissue ulceration overlying the lateral malleolus and 1st distal phalanx.  Generalized subcutaneous edema most pronounced along the dorsum of the foot.  No fluid collection.  Chronic thickening of the plantar aponeurosis.   Impression:       1. Acute osteomyelitis of the posterior 2/3 of the calcaneus with a large overlying soft tissue ulcer that involves the distal insertional fibers of the Achilles tendon.  2. Early osteomyelitis of the lateral malleolus and 1st distal phalanx with overlying soft tissue ulcers.  3. Additional findings, as above.  This report was flagged in Epic as abnormal.    Electronically signed by resident: Raegan Melvin  Date: 10/01/2024  Time: 11:26    Electronically signed by: Erik Hart MD  Date: 10/01/2024  Time: 12:10   MRI Forefoot WO Contrast RT [[id number]] (Abnormal) Resulted: 10/01/24 1210   Order Status: Completed Updated: 10/01/24 1213   Narrative:     EXAMINATION:  MRI FOREFOOT WO CONTRAST RT; MRI HINDFOOT WO CONTRAST RT; MRI MIDFOOT WO CONTRAST RT    CLINICAL HISTORY:  Osteomyelitis, foot;    TECHNIQUE:  MRI of the entire right foot with multiplanar and multisequence imaging.  No intravenous contrast was administered.    COMPARISON:  Right foot radiograph 09/30/2024, right foot CT 06/28/2024, and  right foot MRI 05/16/2023.    FINDINGS:  Exam limited by motion artifact.    BONES: Confluent marrow edema throughout the posterior 2/3 of the calcaneus with corresponding T1 medullary hypointensity and suspected posterior cortical irregularity.  Comfort marrow edema throughout the lateral half of the lateral malleolus with grossly preserved cortical margins and T1 medullary signal.  Confluent marrow edema throughout the 1st distal phalanx with preserved T1 medullary signal and cortical margins.  Contour irregularity of the 5th metatarsal shaft, possibly sequela of a healed fracture or remote infection.  No acute fractures.  No avascular necrosis.    JOINTS: Scattered hindfoot, midfoot and forefoot osteoarthritis.  No subluxation or dislocation.  No joint effusions.    TENDONS: Full-thickness, partial width tearing of the insertional fibers of the Achilles tendon.  Posterior tibial, flexor digitorum longus, flexor hallucis longus, peroneus longus, peroneus brevis and extensor tendons appear grossly intact.    MUSCLES: Severe fatty degeneration and patchy edema of the visualized musculature, likely sequela of chronic denervation:    MISCELLANEOUS: Large soft tissue ulcer overlying the posterior and lateral aspects of the calcaneus.  Soft tissue ulceration overlying the lateral malleolus and 1st distal phalanx.  Generalized subcutaneous edema most pronounced along the dorsum of the foot.  No fluid collection.  Chronic thickening of the plantar aponeurosis.   Impression:       1. Acute osteomyelitis of the posterior 2/3 of the calcaneus with a large overlying soft tissue ulcer that involves the distal insertional fibers of the Achilles tendon.  2. Early osteomyelitis of the lateral malleolus and 1st distal phalanx with overlying soft tissue ulcers.  3. Additional findings, as above.  This report was flagged in Epic as abnormal.    Electronically signed by resident: Raegan Melvin  Date: 10/01/2024  Time:  11:26    Electronically signed by: Erik Hart MD  Date: 10/01/2024  Time: 12:10   US Lower Extremity Arteries Bilateral [[id number]] Resulted: 09/30/24 1712   Order Status: Completed Updated: 09/30/24 1715   Narrative:     EXAMINATION:  US LOWER EXTREMITY ARTERIES BILATERAL    CLINICAL HISTORY:  wound healing;    TECHNIQUE:  Bilateral spectral, color and grayscale images of the large arteries of both lower extremities were performed.    COMPARISON:  Multiple ultrasounds, most recent 07/26/2024    FINDINGS:  Right lower extremity: Peak systolic velocity in the right lower extremity arterial system measures 99 cm/sec in the anterior tibial artery.  No evidence of a hemodynamically significant stenosis.  There are triphasic waveforms throughout most of the right lower extremity.  There are monophasic waveforms in the posterior tibial artery.    Left lower extremity: History of above the knee amputation.  Peak systolic velocity in the left lower extremity arterial system measures 80 cm/sec in the deep femoral artery.  No evidence of a hemodynamically significant stenosis.  There are triphasic and biphasic waveforms throughout most of the left lower extremity.  There are monophasic waveforms in the distal femoral artery.    Miscellaneous: Arrhythmia.  Prominent right inguinal lymph node measuring 0.9 cm short axis, presumably reactive.   Impression:       Left above the knee amputation.    No hemodynamically significant arterial stenosis in either lower extremity.    Arrhythmia.  Correlate with EKG.      Electronically signed by: Rishi Ramos  Date: 09/30/2024  Time: 17:12   X-Ray Foot Complete Right [[id number]] (Abnormal) Resulted: 09/30/24 1426   Order Status: Completed Updated: 09/30/24 1429   Narrative:     EXAMINATION:  XR FOOT COMPLETE 3 VIEW RIGHT    CLINICAL HISTORY:  R heel wound;.    TECHNIQUE:  AP, lateral, and oblique views of the right foot were performed.    COMPARISON:  06/28/2024    FINDINGS:  Three  views right foot.    There is osteopenia.  There is remote injury of the 5th metatarsal.  There is motion blurring.  There is questionable periosteal reaction about the distal aspect of the 5th metatarsal versus motion blurring.  There is edema about the foot.  There are degenerative changes of the calcaneus as well as of the dorsal foot.  There is vascular calcification.  There is skin wound overlying the calcaneus.   Impression:       This report was flagged in Epic as abnormal.    1. Questionable indistinctness about the distal aspect of the 5th metatarsal, may reflect subacute injury to the region however infectious process not excluded.  Please note, evaluation is limited given motion blurring in the region.  Correlation is advised.  2. Diffuse edema about the foot noting skin wound at the level of the calcaneus.  Correlation is needed to exclude exposed bone.  3. There appears to be increased erosive change of the posterior aspect of the calcaneus since the previous examination.  Underlying or chronic osteomyelitis not excluded.      Electronically signed by: Milton Dove MD  Date: 09/30/2024  Time: 14:26   X-Ray Chest AP Portable [[id number]] Resulted: 09/29/24 1755   Order Status: Completed Updated: 09/29/24 1801   Narrative:     EXAMINATION:  XR CHEST AP PORTABLE    CLINICAL HISTORY:  wound infection;    TECHNIQUE:  Single frontal view of the chest was performed.    COMPARISON:  07/25/2024    FINDINGS:  The cardiomediastinal silhouette is not enlarged.  There is elevation of the right hemidiaphragm..  There is no pleural effusion.  The trachea is midline.  The lungs are symmetrically expanded bilaterally with coarse interstitial attenuation.  No large focal consolidation seen.  There is no pneumothorax.  The osseous structures are remarkable for degenerative change..   Impression:       Course interstitial attenuation, similar to the prior exam.  Superimposed edema is a consideration.      Electronically  signed by: Milton Dove MD  Date: 09/29/2024  Time: 17:59      Imaging History     2024    Date Procedure Name Study Review Link PACS Link Status Accession Number Location   10/03/24 10:22 AM Cardiac monitoring strips Study Review  Final     10/02/24 07:55 PM Cardiac monitoring strips Study Review  Final     10/02/24 03:09 PM Cardiac monitoring strips Study Review  Final     10/01/24 10:22 PM Cardiac monitoring strips Study Review  Final     10/01/24 11:23 AM MRI Hindfoot WO Contrast RT Study Review  Images Final [id number] [facility]   10/01/24 11:23 AM MRI Midfoot WO Contrast RT Study Review  Images Final [id number] [facility]   10/01/24 11:23 AM MRI Forefoot WO Contrast RT Study Review  Images Final [id number] [facility]   10/01/24 09:44 AM Cardiac monitoring strips Study Review  Final     09/30/24 11:22 PM Cardiac monitoring strips Study Review  Final     09/30/24 05:36 PM Cardiac monitoring strips Study Review  Final     09/30/24 04:54 PM US Lower Extremity Arteries Bilateral Study Review  Images Final [id number] [facility]   09/30/24 01:10 PM X-Ray Foot Complete Right Study Review  Images Final [id number] [facility]   09/29/24 10:35 PM Cardiac monitoring strips Study Review  Final     09/29/24 05:10 PM X-Ray Chest AP Portable Study Review  Images Final [id number] [facility]

## 2024-10-07 NOTE — ASSESSMENT & PLAN NOTE
75 yo male admitted with R heel wound with MRI showing calcaneal osteo and pelvic wounds.  L hip wound debrided and cultures show proteus m. Per gen sx, L hip wound does not probe to bone. POD following.      10/03/2024: Afebrile and WBC WNL.  Per general surgery note, left hip wound still with purulence.  Podiatry performed bone biopsy 10/03/2024 on right heel.  Patient reports slight subjective improvement.  Patient had been previously treated for right heel osteo ending 08/12/2024 by [facility] for MRSA and  Pseudomonas infection.  Possible that R foot MRI findings are residual from prior infection and artifact.    10/4/24: Stable.  MRI pelvis with osteomyelitis.  R heel bone biopsy done.  Cultures pending.  Gen Sx signed off re: sacral/L hip wounds    10/7/24: Stable.  R heel bone with Staph caprae and pan sensitive Pseudomonas.  L hip wound cx with proteus M - FQ resistant - no anaerobic cx done.  CRP down to 76 from 114    Plan:  Continue daptomycin and zosyn  FU calcaneal biopsy culture sensitivities.  Trend CRP  PICC placement and plan for long term abx  Rec O SNF/[facility]  Will follow  Seen with ID staff

## 2024-10-07 NOTE — PLAN OF CARE
Patient in bed, resting. VSS on RA. One complaint of pain overnight. Safety precautions maintained and call light in reach.       Problem: Pain Acute  Goal: Optimal Pain Control and Function  Outcome: Progressing     Problem: Fall Injury Risk  Goal: Absence of Fall and Fall-Related Injury  Outcome: Progressing     Problem: Skin Injury Risk Increased  Goal: Skin Health and Integrity  Outcome: Progressing      [de-identified] : Endovascular repair of abdominal aortic aneurysm using aorto unilimb with femorofemoral bypass [de-identified] : Status post endovascular repair abdominal aortic aneurysm.  Patient is currently doing well.  He is ambulating without difficulty.

## 2024-10-07 NOTE — PROGRESS NOTES
[facility] - Stepdown [facility] ([place] [id number])  [facility] Medicine  Progress Note    Patient Name: Saji Castañeda  MRN: [id number]  Patient Class: IP- Inpatient   Admission Date: 9/29/2024  Length of Stay: 7 days  Attending Physician: Seymour Betancourt MD  Primary Care Provider: Vinod Elise MD        Subjective:     Principal Problem:Osteomyelitis        HPI:  Saji Castañeda is a 74 y.o. 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 Arellano, Chronic Multifactorial Anemia, BPH s/p Chronic Sams, paroxysmal A-fib on eliquis, CHB s/p PPM medtronic (10/12/2023), CKD3, Multifactorial HTN, HLD and Osteomyelitis of R foot presenting [facility] with suspected infection of L hip wound. He is nonambulatory . Patient has chronic, extensive sacral wounds, a R heel wound and a wound on the L hip with purulence and a foul odor. The patient was recommended to come to the ED due to concerns of wound infection by nursing staff at NH. Patient reports intermittent pain at the wound sites that is controlled with Oxycodone. Patient states that wound care manages these sites at the nursing home. Patient is alert, oriented and responsive to questioning however he is a poor historian regarding his medical history. Per Chart Review the patient was admitted to the ED on 7/25 for AMS while on Vanc and Cefepime for Osteomyelitis. Cefepime neurotoxicity was suspected and therapy was switched to Vanc and Zosyn. He then developed  He was transferred to [facility] at that time and had a pacemaker placed. Patient reports currently being on oral antibiotic therapy but is unsure what he is taking. He reports taking his medications daily that the nurse at NH provides. He reports never smoking or using drugs and no longer drinks alcohol.      Patient reports chronic Sams cath usage for unknown reason that was last changed today. He reports an episode of  dysuria yesterday and endorses suprapubic pain on palpation. He denies hematuria, urinary frequency/urgency, urinary odor or abdominal pain.      He also reports a mild cough and chills for the past several weeks with scant sputum production but denies fever, chest pain, SOB or recent illness. He reports chronic diaphoresis at night that requires him to change clothes in the morning.      Overview/Hospital Course:   74 y.o. 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 Arellano, Chronic Multifactorial Anemia, BPH s/p Chronic Sams, paroxysmal A-fib on eliquis, CHB s/p PPM medtronic (10/12/2023), CKD3, Multifactorial HTN, HLD and Osteomyelitis of R foot presenting from [facility] with suspected infection of sacral wound. Started on Vanc and Zosyn given prior history of prior susceptibilities. General surgery plans for wound debridement 10/1. Wound care and podiatry following. X-ray of right foot c/f chronic osteomyelitis. MRI with acute osteomyelitis of the posterior 2/3 of the calcaneus and early osteomyelitis of the lateral malleolus and 1st distal phalanx. R heel bone biopsy and debridement done by podiatry on 10/3. R foot bone culture positive for staph caprae and gram negative mikey species, susceptibilities pending.    Interval History: NAEON. Patient reports feeling well this morning but is complaining of sacral wound pain. He is tolerating diet without issue. Remains afebrile, HDS, and on room air. Culture data pending sensitivities for S Caprae and PsA and Proteus. Awaiting final antibiotic recommendations per ID and PICC placement, anticipated discharge tomorrow vs 10/9.    Review of Systems   Constitutional:  Negative for activity change, appetite change, chills and fever.   Respiratory:  Negative for cough and shortness of breath.    Gastrointestinal:  Negative for abdominal pain, diarrhea, nausea and  vomiting.   Genitourinary:  Negative for dysuria.   Musculoskeletal:  Positive for myalgias.   Skin:  Positive for wound.   Neurological:  Negative for dizziness and headaches.     Objective:     Vital Signs (Most Recent):  Temp: 98.7 °F (37.1 °C) (10/07/24 0356)  Pulse: 83 (10/07/24 1120)  Resp: 20 (10/07/24 0931)  BP: (!) 104/58 (10/07/24 0356)  SpO2: 100 % (10/07/24 0356) Vital Signs (24h Range):  Temp:  [98.2 °F (36.8 °C)-99.1 °F (37.3 °C)] 98.7 °F (37.1 °C)  Pulse:  [75-89] 83  Resp:  [15-20] 20  SpO2:  [95 %-100 %] 100 %  BP: (104-145)/(54-67) 104/58     Weight: 83.4 kg (183 lb 13.8 oz)  Body mass index is 27.15 kg/m².    Intake/Output Summary (Last 24 hours) at 10/7/2024 1135  Last data filed at 10/7/2024 0429  Gross per 24 hour   Intake --   Output 1000 ml   Net -1000 ml         Physical Exam  Constitutional:       General: He is not in acute distress.     Appearance: He is ill-appearing. He is not diaphoretic.   HENT:      Head: Normocephalic and atraumatic.   Eyes:      General: No scleral icterus.        Right eye: No discharge.         Left eye: No discharge.      Extraocular Movements: Extraocular movements intact.   Cardiovascular:      Rate and Rhythm: Normal rate. Rhythm irregular.      Heart sounds: No murmur heard.  Pulmonary:      Effort: Pulmonary effort is normal. No respiratory distress.   Abdominal:      General: Abdomen is flat. There is no distension.   Musculoskeletal:      Cervical back: Normal range of motion.      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 Surgical scar well-healed  Right lower extremity with cracked, dry skin as well as right heel wound  R heel dressing in tact      Skin:     General: Skin is dry.   Neurological:      General: No focal deficit present.      Mental Status: He is alert.             Significant Labs: All pertinent labs within the past 24 hours have been reviewed.    Significant Imaging: I have reviewed all pertinent imaging results/findings within  the past 24 hours.    Assessment/Plan:      * Osteomyelitis  R heal OM diagnosed during admission 6/26-7/11 with bone cx positive for pseudomonas and MRSA. Discharged on vanc and cefepime for 6 week course.  Recent admission 8/2-8/6, at that time Cefepime discontinued due to c/f Cefepime neurotoxicity, then developed Vanc YNES, and discharged on Daptomycin and Zosyn  - extensive grade 3-4 sacral wounds, L hip wound purulence  - chronic osteomyelitis of R heel with nonhealing ulcer    - Wound Cx from L buttocks, positive for proteus susceptible zosyn  - Wound Cx from R foot Aerobic culture, positive for staph species and GNR  - Blood Cx NGTD  - started on IV Vanc in the ED, recommend renally dosing  - continue Vanc 1750 mg q12h   - continue Zosyn  - Wound care consulted, recommended podiatry consult, providing   - WBC elevated 13.6, likely due to wound infection  - trend CBC and CMP  - procalcitonin 0.12  - continue oxycodone and tylenol PRN for pain  - per pharmacy, obtain vanc trough on 10/01 at 6:00 before giving 4th dose, range 10-20 mcg/mL  - podiatry consulted, performed bone biopsy and debridement of the R heel on 10/3.   - MRI R foot ordered , c/f acute osteomyelitis of 2/3 calcaneus and early osteomyelitis of lateral malleolus & 1st distal phalanx  - MRI pelvis ordered, Bilateral osteomyelitis of the posterior iliac bones  - ID consulted, rec to continue Daptomycin & Zosyn ; awaiting final antibiotic recommendations and PICC placement  - f/u culture susceptbilities    Chronic indwelling Fung catheter  Pt has failed multiple void trials in the past, now with chronic fung. Most recently replaced 9/29. C/f UTI  UA shows +1 blood, 3+ leuk esterase, >100 WBCs and few bacteria, patient reports episode of dysuria yesterday and suprapubic pain on palpation   - Ucx, NGTD          Anemia  Anemia is likely due to chronic disease due to Chronic Kidney Disease. Most recent hemoglobin and hematocrit are listed  below.  Recent Labs     10/04/24  0308 10/05/24  0531 10/06/24  0522   HGB 7.5* 7.7* 7.3*   HCT 24.1* 25.9* 25.3*     Plan  - Monitor serial CBC: daily  - Transfuse PRBC if patient becomes hemodynamically unstable, symptomatic or H/H drops below 7/21.  - Patient has received 1 units of PRBCs on 9/29  & 1 unit pRBCs on 10/3  - Patient's anemia is currently   7.1, below baseline 8-9  - Likely multifactorial with AOCD, as well as potentially GI bleed vs  AVM. Continue to monitor  - H&H 6.3 and 21.6   - hold iron supplement   - guaic + stool test  - Elevated ferritin, Anemia 2/2 chronic disease      Sacral wound  Left buttocks wound cx: proteus positive, susceptibles pending    - MRI pelvis ordered, Bilateral osteomyelitis of the posterior iliac bones.   - Wound Cx positive for proteus    Stage 3b chronic kidney disease  Creatine stable for now. BMP reviewed- noted Estimated Creatinine Clearance: 58 mL/min (based on SCr of 1.1 mg/dL). according to latest data. Based on current GFR, CKD stage is stage 3 - GFR 30-59.  Monitor UOP and serial BMP and adjust therapy as needed. Renally dose meds. Avoid nephrotoxic medications and procedures.    - hold nephrotoxic medications  - hold Lasix at this time   - renally dose Vanc    Paroxysmal atrial fibrillation  Patient with Paroxysmal (<7 days) atrial fibrillation which is uncontrolled. Patient is currently in atrial fibrillation.      echo on 7/2024 showed EF of 55-60% with no significant valvular abnormalities   - follows with Vascular Surgeon Dr. Banda  - hold home Lasix and HCTZ, not volume overloaded   - electrolytes stable, trend CMP  - hold home Eliquis due to unstable H&H, trend CBC  - monitor Afib, continuous telemetry   - Restarted home Eliquis, currently no further plans for surgical intervention    Wound of left leg  Left hip wound with purulent foul smelling drainage    - Wound cx ordered, f/u results  - On Daptomycin/Zosyn  - Gen surg consulted for debridement  ,completed  beside debridement of L hip and sacral wound on 10/1          Type 2 diabetes mellitus, with long-term current use of insulin   Hb A1c on 7/25 was 6. Home Lantus 17 u BID  - Lantus 12 units BID, increased from 8 units BID due to elevated glucose  - continue LDSSI   - POCT glucose checks before meals and nightly      Essential hypertension  Chronic, uncontrolled. Latest blood pressure and vitals reviewed-     Temp:  [97.6 °F (36.4 °C)-98.7 °F (37.1 °C)]   Pulse:  [74-83]   Resp:  [12-18]   BP: (101-144)/(55-72)   SpO2:  [94 %-100 %] .   Home meds for hypertension were reviewed and noted below.   Hypertension Medications               furosemide (LASIX) 20 MG tablet Take 1 tablet (20 mg total) by mouth once daily.    NIFEdipine (PROCARDIA-XL) 90 MG (OSM) 24 hr tablet Take 1 tablet (90 mg total) by mouth once daily.            While in the hospital, will manage blood pressure as follows; Adjust home antihypertensive regimen as follows- held home Nifedipine    Will utilize p.r.n. blood pressure medication only if patient's blood pressure greater than 180/110 and he develops symptoms such as worsening chest pain or shortness of breath.     - hold diuretics, no signs of volume overload           VTE Risk Mitigation (From admission, onward)           Ordered     apixaban tablet 5 mg  2 times daily         10/05/24 8334     Reason for No Pharmacological VTE Prophylaxis  Once        Question:  Reasons:  Answer:  Risk of Bleeding  Comment:  c/f bleeding with drop in Hgb    09/29/24 1914     IP VTE HIGH RISK PATIENT  Once         09/29/24 1914     Place sequential compression device  Until discontinued         09/29/24 1914                    Discharge Planning   OXANA: 10/9/2024     Code Status: Full Code   Is the patient medically ready for discharge?:     Reason for patient still in hospital (select all that apply): Treatment, Consult recommendations, and Pending disposition  Discharge Plan A: Skilled Nursing  Facility   Discharge Delays: None known at this time              Marilu Mayfield DO  Department of Hospital Medicine   [facility] - [facility] ([place])

## 2024-10-07 NOTE — PROGRESS NOTES
Pt AAOx4, VSS, no acute changes this shift. Contact precautions maintained. Continues with IV ABX and pain management. Chronic fung in place, draining yellow urine. Blood glucose monitored. Nad present. Call ochoa in reach, bed locked and lowered. Suction at bedside. Safety precautions maintained.    10/07/24 0931   Pain/Comfort/Sleep   Pain Rating (0-10): Rest 10   POSS ([place] Opioid-Induced Sed Scale) 1 - Awake and alert   Pain Reassessment   Pain Rating Prior to Med Admin 10   Cognitive   Cognitive/Neuro/Behavioral WDL WDL   Level of Consciousness (AVPU) alert   Arousal Level opens eyes spontaneously   Orientation oriented x 4   Speech clear/fluent;follows commands   Mood/Behavior behavior appropriate to situation   Cognitive/Behavioral/Neuro   General Motor Response purposeful motor response   [place] Coma Scale   Best Eye Response 4-->(E4) spontaneous   Best Motor Response 6-->(M6) obeys commands   Best Verbal Response 5-->(V5) oriented   [place] Coma Scale Score 15   HEENT   HEENT WDL ex   Mouth/Teeth WDL   Mouth/Teeth WDL ex;teeth   Teeth Symptoms tooth/teeth missing   Respiratory   Respiratory WDL WDL   Rhythm/Pattern, Respiratory no shortness of breath reported   Cardiac   Cardiac WDL ex;rhythm   Peripheral Neurovascular   Peripheral Neurovascular WDL WDL   Capillary Refill, LUE less than/equal to 3 secs   Capillary Refill, LLE absent   Pulse Radial   Left Radial Pulse 2+ (normal)   Right Radial Pulse 2+ (normal)   Pulse Dorsalis Pedis   Left Dorsalis Pedis Pulse 2+ (normal)   Right Dorsalis Pedis Pulse 2+ (normal)        Peripheral IV - Single Lumen 10/06/24 1637 20 G 1 3/4 in Anterior;Right Forearm   Placement Date/Time: 10/06/24 1637   Present Prior to Hospital Arrival?: No  Inserted by: RN  Size (G): 20 G  Length (in): 1 3/4 in  Orientation: Anterior;Right  Location: Forearm  Placement directed by: Ultrasound  Site Prep: Chlorhexidine   Local An...   Site Assessment Clean;Dry;Intact;No redness;No  "swelling   Extremity Assessment Distal to IV No abnormal discoloration;No redness;No swelling;No warmth   Line Status Flushed   Dressing Status Clean;Dry;Intact   Dressing Intervention Integrity maintained   Skin   Skin WDL ex   General Skin Color/Characteristics other (see comments)  (lt aka)   Skin Temperature warm   Skin Elasticity slow return to original state   Tomas Risk Assessment   Sensory Perception 4-->no impairment   Moisture 3-->occasionally moist   Activity 1-->bedfast   Mobility 1-->completely immobile   Nutrition 3-->adequate   Friction and Shear 2-->potential problem   Tomas Score 14   Musculoskeletal   Musculoskeletal WDL ex;mobility   General Mobility significantly impaired   Gastrointestinal   GI WDL ex;GI symptoms   Abdominal Appearance nondistended   GI Signs/Symptoms fecal incontinence   Genitourinary   Genitourinary WDL ex;voiding ability/characteristics   Voiding Characteristics urethral catheter (bladder)        Urethral Catheter 09/29/24 1714 Straight-tip 16 Fr.   Placement Date/Time: 09/29/24 1714   Present Prior to Hospital Arrival?: No  Hand Hygiene: Performed  Inserted by: RN   Name: Kandi Nina RN  Insertion attempts (enter comment if more than 2 attempts): 1  Catheter Type: Straight-tip  Tube ...   Site Assessment Clean;Intact   Collection Container Urimeter   Securement Method secured to top of thigh w/ adhesive device   Catheter Care Performed yes   Reason for Continuing Urinary Catheterization Chronic Indwelling Urinary Catheter on Admission   CAUTI Prevention Bundle Securement Device in place with 1" slack   Coping/Psychosocial   Observed Emotional State accepting;calm;cooperative   Verbalized Emotional State acceptance   Coping/Psychosocial Interventions   Supportive Measures active listening utilized   Nutrition   Nutrition Risk Screen no indicators present   Safety   Safety WDL WDL   Safety Factors ID band on;upper side rails raised x 2;call light in reach;wheels " locked;bed in low position   Enhanced Safety Measures bed alarm set   Infection Prevention hand hygiene promoted;personal protective equipment utilized;rest/sleep promoted;equipment surfaces disinfected;environmental surveillance performed;cohorting utilized   Isolation Precautions precautions maintained   Fall Risk Assessment (every shift)   History Of Fall (W/I 3 Mos) 0-->No   Polypharmacy 3-->Yes   Central Nervous System/Psychotropic Medication 3-->Yes   Cardiovascular Medication 3-->Yes   Age Greater Than 65 Years 2-->Yes   Altered Elimination 2-->Yes   Cognitive Deficit 0-->No   Sensory Deficit 0-->No   Dizziness/Vertigo 0-->No   Depression 0-->No   Mobility Deficit/Weakness 2-->Yes   Male 1-->Yes   Fall Risk Score 16   ABC Risk for Fall with Injury Assessment   A= Age: Is the patient greater than or equal to 85 years old or frail due to clinical condition? No   B=Bones: Does the patient have osteoporosis, previous fracture, prolonged steroid use, or metastatic bone cancer? No   C=Coagulation Disorders: Does the patient have a bleeding disorder, either through anticoagulants or underlying clinical condition? No   S=recent Surgery: Is the patient post-op surgicalwith a recent lower limb amputation or recent major abdominal or thoracic surgery? No   Safety Management   Safety Promotion/Fall Prevention assistive device/personal item within reach;instructed to call staff for mobility;lighting adjusted;medications reviewed;pulse ox;room near unit station;side rails raised x 2;nonskid shoes/socks when out of bed;Fall Risk reviewed with patient/family   Medication Review/Management medications reviewed   Patient Rounds bed in low position;bed wheels locked;call light in patient/parent reach;clutter free environment maintained;ID band on;placement of personal items at bedside;toileting offered;visualized patient   Safety Bands on Patient Fall Risk Band   Elopement Precautions bed alarm   Daily Care   Activity  Management Rolling - L1   Activity Assistance Provided assistance, 2 people   Mobility   GEMS ([place] Early Mobility Scale) Level 1-Primary in bed activities

## 2024-10-08 LAB
ALBUMIN SERPL BCP-MCNC: 1.5 G/DL (ref 3.5–5.2)
ALP SERPL-CCNC: 129 U/L (ref 55–135)
ALT SERPL W/O P-5'-P-CCNC: 16 U/L (ref 10–44)
ANION GAP SERPL CALC-SCNC: 11 MMOL/L (ref 8–16)
AST SERPL-CCNC: 35 U/L (ref 10–40)
BACTERIA SPEC AEROBE CULT: ABNORMAL
BACTERIA SPEC AEROBE CULT: ABNORMAL
BASOPHILS # BLD AUTO: 0.06 K/UL (ref 0–0.2)
BASOPHILS NFR BLD: 0.7 % (ref 0–1.9)
BILIRUB SERPL-MCNC: 0.3 MG/DL (ref 0.1–1)
BUN SERPL-MCNC: 16 MG/DL (ref 8–23)
CALCIUM SERPL-MCNC: 9 MG/DL (ref 8.7–10.5)
CHLORIDE SERPL-SCNC: 107 MMOL/L (ref 95–110)
CO2 SERPL-SCNC: 23 MMOL/L (ref 23–29)
CREAT SERPL-MCNC: 1.1 MG/DL (ref 0.5–1.4)
DIFFERENTIAL METHOD BLD: ABNORMAL
EOSINOPHIL # BLD AUTO: 0.6 K/UL (ref 0–0.5)
EOSINOPHIL NFR BLD: 6.8 % (ref 0–8)
ERYTHROCYTE [DISTWIDTH] IN BLOOD BY AUTOMATED COUNT: 17 % (ref 11.5–14.5)
EST. GFR  (NO RACE VARIABLE): >60 ML/MIN/1.73 M^2
GLUCOSE SERPL-MCNC: 146 MG/DL (ref 70–110)
HCT VFR BLD AUTO: 26.6 % (ref 40–54)
HGB BLD-MCNC: 8.6 G/DL (ref 14–18)
IMM GRANULOCYTES # BLD AUTO: 0.09 K/UL (ref 0–0.04)
IMM GRANULOCYTES NFR BLD AUTO: 1 % (ref 0–0.5)
LYMPHOCYTES # BLD AUTO: 1.6 K/UL (ref 1–4.8)
LYMPHOCYTES NFR BLD: 17.9 % (ref 18–48)
MAGNESIUM SERPL-MCNC: 1.9 MG/DL (ref 1.6–2.6)
MCH RBC QN AUTO: 26.4 PG (ref 27–31)
MCHC RBC AUTO-ENTMCNC: 32.3 G/DL (ref 32–36)
MCV RBC AUTO: 82 FL (ref 82–98)
MONOCYTES # BLD AUTO: 0.5 K/UL (ref 0.3–1)
MONOCYTES NFR BLD: 5.6 % (ref 4–15)
NEUTROPHILS # BLD AUTO: 6.2 K/UL (ref 1.8–7.7)
NEUTROPHILS NFR BLD: 68 % (ref 38–73)
NRBC BLD-RTO: 0 /100 WBC
PHOSPHATE SERPL-MCNC: 3.8 MG/DL (ref 2.7–4.5)
PLATELET # BLD AUTO: 639 K/UL (ref 150–450)
PMV BLD AUTO: 8.8 FL (ref 9.2–12.9)
POCT GLUCOSE: 143 MG/DL (ref 70–110)
POCT GLUCOSE: 195 MG/DL (ref 70–110)
POCT GLUCOSE: 233 MG/DL (ref 70–110)
POCT GLUCOSE: 234 MG/DL (ref 70–110)
POTASSIUM SERPL-SCNC: 4.5 MMOL/L (ref 3.5–5.1)
PROT SERPL-MCNC: 7 G/DL (ref 6–8.4)
RBC # BLD AUTO: 3.26 M/UL (ref 4.6–6.2)
SODIUM SERPL-SCNC: 141 MMOL/L (ref 136–145)
WBC # BLD AUTO: 9.1 K/UL (ref 3.9–12.7)

## 2024-10-08 PROCEDURE — 63600175 PHARM REV CODE 636 W HCPCS: Mod: HCNC | Performed by: PHYSICIAN ASSISTANT

## 2024-10-08 PROCEDURE — 25000003 PHARM REV CODE 250: Mod: HCNC

## 2024-10-08 PROCEDURE — 83735 ASSAY OF MAGNESIUM: CPT | Mod: HCNC

## 2024-10-08 PROCEDURE — C1751 CATH, INF, PER/CENT/MIDLINE: HCPCS | Mod: HCNC

## 2024-10-08 PROCEDURE — 99233 SBSQ HOSP IP/OBS HIGH 50: CPT | Mod: HCNC,,, | Performed by: INTERNAL MEDICINE

## 2024-10-08 PROCEDURE — 20600001 HC STEP DOWN PRIVATE ROOM: Mod: HCNC

## 2024-10-08 PROCEDURE — 25000003 PHARM REV CODE 250: Mod: HCNC | Performed by: PHYSICIAN ASSISTANT

## 2024-10-08 PROCEDURE — 80053 COMPREHEN METABOLIC PANEL: CPT | Mod: HCNC

## 2024-10-08 PROCEDURE — 36573 INSJ PICC RS&I 5 YR+: CPT | Mod: HCNC

## 2024-10-08 PROCEDURE — 25000003 PHARM REV CODE 250: Mod: HCNC | Performed by: HOSPITALIST

## 2024-10-08 PROCEDURE — 27000207 HC ISOLATION: Mod: HCNC

## 2024-10-08 PROCEDURE — 84100 ASSAY OF PHOSPHORUS: CPT | Mod: HCNC

## 2024-10-08 PROCEDURE — 36415 COLL VENOUS BLD VENIPUNCTURE: CPT | Mod: HCNC

## 2024-10-08 PROCEDURE — 02HV33Z INSERTION OF INFUSION DEVICE INTO SUPERIOR VENA CAVA, PERCUTANEOUS APPROACH: ICD-10-PCS | Performed by: HOSPITALIST

## 2024-10-08 PROCEDURE — 63600175 PHARM REV CODE 636 W HCPCS: Mod: HCNC

## 2024-10-08 PROCEDURE — A4216 STERILE WATER/SALINE, 10 ML: HCPCS | Mod: HCNC | Performed by: HOSPITALIST

## 2024-10-08 PROCEDURE — 99223 1ST HOSP IP/OBS HIGH 75: CPT | Mod: HCNC,,, | Performed by: STUDENT IN AN ORGANIZED HEALTH CARE EDUCATION/TRAINING PROGRAM

## 2024-10-08 PROCEDURE — 85025 COMPLETE CBC W/AUTO DIFF WBC: CPT | Mod: HCNC

## 2024-10-08 PROCEDURE — 76937 US GUIDE VASCULAR ACCESS: CPT | Mod: HCNC

## 2024-10-08 RX ORDER — SODIUM CHLORIDE 0.9 % (FLUSH) 0.9 %
10 SYRINGE (ML) INJECTION
Status: DISCONTINUED | OUTPATIENT
Start: 2024-10-08 | End: 2024-10-09 | Stop reason: HOSPADM

## 2024-10-08 RX ORDER — SODIUM CHLORIDE 0.9 % (FLUSH) 0.9 %
10 SYRINGE (ML) INJECTION EVERY 6 HOURS
Status: DISCONTINUED | OUTPATIENT
Start: 2024-10-08 | End: 2024-10-09 | Stop reason: HOSPADM

## 2024-10-08 RX ADMIN — INSULIN ASPART 1 UNITS: 100 INJECTION, SOLUTION INTRAVENOUS; SUBCUTANEOUS at 08:10

## 2024-10-08 RX ADMIN — HYDROMORPHONE HYDROCHLORIDE 0.2 MG: 1 INJECTION, SOLUTION INTRAMUSCULAR; INTRAVENOUS; SUBCUTANEOUS at 08:10

## 2024-10-08 RX ADMIN — INSULIN ASPART 2 UNITS: 100 INJECTION, SOLUTION INTRAVENOUS; SUBCUTANEOUS at 05:10

## 2024-10-08 RX ADMIN — ZINC SULFATE 220 MG (50 MG) CAPSULE 220 MG: CAPSULE at 08:10

## 2024-10-08 RX ADMIN — APIXABAN 5 MG: 5 TABLET, FILM COATED ORAL at 08:10

## 2024-10-08 RX ADMIN — TAMSULOSIN HYDROCHLORIDE 0.4 MG: 0.4 CAPSULE ORAL at 08:10

## 2024-10-08 RX ADMIN — ACETAMINOPHEN 650 MG: 325 TABLET ORAL at 02:10

## 2024-10-08 RX ADMIN — THERA TABS 1 TABLET: TAB at 08:10

## 2024-10-08 RX ADMIN — GABAPENTIN 300 MG: 300 CAPSULE ORAL at 08:10

## 2024-10-08 RX ADMIN — ACETAMINOPHEN 650 MG: 325 TABLET ORAL at 08:10

## 2024-10-08 RX ADMIN — FUROSEMIDE 20 MG: 20 TABLET ORAL at 08:10

## 2024-10-08 RX ADMIN — PIPERACILLIN SODIUM AND TAZOBACTAM SODIUM 4.5 G: 4; .5 INJECTION, POWDER, FOR SOLUTION INTRAVENOUS at 05:10

## 2024-10-08 RX ADMIN — Medication 10 ML: at 05:10

## 2024-10-08 RX ADMIN — OXYCODONE HYDROCHLORIDE AND ACETAMINOPHEN 500 MG: 500 TABLET ORAL at 08:10

## 2024-10-08 RX ADMIN — INSULIN GLARGINE 12 UNITS: 100 INJECTION, SOLUTION SUBCUTANEOUS at 08:10

## 2024-10-08 RX ADMIN — ACETAMINOPHEN 650 MG: 325 TABLET ORAL at 05:10

## 2024-10-08 RX ADMIN — PIPERACILLIN SODIUM AND TAZOBACTAM SODIUM 4.5 G: 4; .5 INJECTION, POWDER, FOR SOLUTION INTRAVENOUS at 02:10

## 2024-10-08 RX ADMIN — PIPERACILLIN SODIUM AND TAZOBACTAM SODIUM 4.5 G: 4; .5 INJECTION, POWDER, FOR SOLUTION INTRAVENOUS at 08:10

## 2024-10-08 RX ADMIN — DAPTOMYCIN 665 MG: 350 INJECTION, POWDER, LYOPHILIZED, FOR SOLUTION INTRAVENOUS at 01:10

## 2024-10-08 NOTE — SUBJECTIVE & OBJECTIVE
Interval History:   No acute events  Afebrile and white blood cell count normal  Blood cultures no growth to date  L hip wound culture - Proteus mirabilis  Right heel bone culture - Staph caprea/Pseudomonas  Sacral and pain slightly improved  Denies fevers chills sweats    Review of Systems   Constitutional:  Negative for chills, diaphoresis and fever.   Respiratory:  Negative for shortness of breath.    Cardiovascular:  Negative for chest pain.   Gastrointestinal:  Negative for abdominal pain, diarrhea, nausea and vomiting.   Genitourinary:  Negative for dysuria and hematuria.   Skin:  Positive for wound.     Objective:     Vital Signs (Most Recent):  Temp: 97.4 °F (36.3 °C) (10/08/24 0820)  Pulse: 81 (10/08/24 0820)  Resp: 18 (10/08/24 0820)  BP: 130/64 (10/08/24 0838)  SpO2: 97 % (10/08/24 0820) Vital Signs (24h Range):  Temp:  [97.4 °F (36.3 °C)-98.3 °F (36.8 °C)] 97.4 °F (36.3 °C)  Pulse:  [71-83] 81  Resp:  [12-18] 18  SpO2:  [97 %-100 %] 97 %  BP: (108-130)/(55-72) 130/64     Weight: 83.4 kg (183 lb 13.8 oz)  Body mass index is 27.15 kg/m².    Estimated Creatinine Clearance: 58 mL/min (based on SCr of 1.1 mg/dL).     Physical Exam  Constitutional:       General: He is not in acute distress.     Appearance: He is well-developed. He is ill-appearing. He is not toxic-appearing or diaphoretic.       HENT:      Head: Normocephalic and atraumatic.   Cardiovascular:      Rate and Rhythm: Normal rate and regular rhythm.      Heart sounds: Normal heart sounds. No murmur heard.     No friction rub. No gallop.   Pulmonary:      Effort: Pulmonary effort is normal. No respiratory distress.      Breath sounds: Normal breath sounds. No wheezing or rales.   Abdominal:      General: Bowel sounds are normal. There is no distension.      Palpations: Abdomen is soft. There is no mass.      Tenderness: There is no abdominal tenderness. There is no guarding or rebound.   Skin:     General: Skin is warm and dry.   Neurological:       Mental Status: He is alert and oriented to person, place, and time.   Psychiatric:         Behavior: Behavior normal.       10/4/24 above                       Significant Labs: Blood Culture:   Recent Labs   Lab 06/28/24  2251 06/28/24 2252 07/25/24 2114 07/25/24  2115 09/29/24  1446   LABBLOO No growth after 5 days. No growth after 5 days. No growth after 5 days. No growth after 5 days. No growth after 5 days.  No growth after 5 days.     CBC:   Recent Labs   Lab 10/07/24  0801 10/08/24  0538   WBC 11.21 9.10   HGB 8.4* 8.6*   HCT 30.5* 26.6*   * 639*     CMP:   Recent Labs   Lab 10/07/24  0801 10/08/24  0538    141   K 5.0 4.5    107   CO2 23 23   * 146*   BUN 14 16   CREATININE 0.9 1.1   CALCIUM 8.7 9.0   PROT 7.0 7.0   ALBUMIN 1.5* 1.5*   BILITOT 0.2 0.3   ALKPHOS 135 129   AST 40 35   ALT 17 16   ANIONGAP 7* 11     Urine Culture:   Recent Labs   Lab 05/08/24  2121 05/19/24  2102 07/25/24  1257 09/29/24  1722   LABURIN PROVIDENCIA STUARTII  >100,000 cfu/ml  * CANDIDA GLABRATA  10,000 - 49,999 cfu/ml  Treatment of asymptomatic candiduria is not recommended (except for   specific populations). Candida isolated in the urine typically   represents colonization. If an indwelling urinary catheter is present  it should be removed or replaced.  * No growth No growth     Urine Studies:   Recent Labs   Lab 08/05/24  1515 09/29/24  1722   COLORU Yellow Colorless*   APPEARANCEUA Hazy* Clear   PHUR 6.0 6.0   SPECGRAV 1.015 1.010   PROTEINUA 1+* Negative   GLUCUA Negative Negative   KETONESU Negative Negative   BILIRUBINUA Negative Negative   OCCULTUA 1+* 1+*   NITRITE Negative Negative   UROBILINOGEN Negative  --    LEUKOCYTESUR 3+* 3+*   RBCUA 25* 15*   WBCUA >100* >100*   BACTERIA Occasional Few*   SQUAMEPITHEL  --  1   HYALINECASTS 0  --      Wound Culture:   Recent Labs   Lab 07/01/24  1241 09/29/24  1628 10/03/24  1303   LABAERO METHICILLIN RESISTANT STAPHYLOCOCCUS AUREUS  Few  *   PSEUDOMONAS AERUGINOSA   Few  * PROTEUS MIRABILIS  Few  Skin bossman also present  * STAPHYLOCOCCUS CAPRAE  Few  *  PSEUDOMONAS AERUGINOSA  Few  Skin bossman also present  *     All pertinent labs within the past 24 hours have been reviewed.    Significant Imaging: I have reviewed all pertinent imaging results/findings within the past 24 hours.  MRI Midfoot WO Contrast RT [[id number]] (Abnormal) Resulted: 10/01/24 1210   Order Status: Completed Updated: 10/01/24 1213   Narrative:     EXAMINATION:  MRI FOREFOOT WO CONTRAST RT; MRI HINDFOOT WO CONTRAST RT; MRI MIDFOOT WO CONTRAST RT    CLINICAL HISTORY:  Osteomyelitis, foot;    TECHNIQUE:  MRI of the entire right foot with multiplanar and multisequence imaging.  No intravenous contrast was administered.    COMPARISON:  Right foot radiograph 09/30/2024, right foot CT 06/28/2024, and right foot MRI 05/16/2023.    FINDINGS:  Exam limited by motion artifact.    BONES: Confluent marrow edema throughout the posterior 2/3 of the calcaneus with corresponding T1 medullary hypointensity and suspected posterior cortical irregularity.  Comfort marrow edema throughout the lateral half of the lateral malleolus with grossly preserved cortical margins and T1 medullary signal.  Confluent marrow edema throughout the 1st distal phalanx with preserved T1 medullary signal and cortical margins.  Contour irregularity of the 5th metatarsal shaft, possibly sequela of a healed fracture or remote infection.  No acute fractures.  No avascular necrosis.    JOINTS: Scattered hindfoot, midfoot and forefoot osteoarthritis.  No subluxation or dislocation.  No joint effusions.    TENDONS: Full-thickness, partial width tearing of the insertional fibers of the Achilles tendon.  Posterior tibial, flexor digitorum longus, flexor hallucis longus, peroneus longus, peroneus brevis and extensor tendons appear grossly intact.    MUSCLES: Severe fatty degeneration and patchy edema of the visualized musculature,  likely sequela of chronic denervation:    MISCELLANEOUS: Large soft tissue ulcer overlying the posterior and lateral aspects of the calcaneus.  Soft tissue ulceration overlying the lateral malleolus and 1st distal phalanx.  Generalized subcutaneous edema most pronounced along the dorsum of the foot.  No fluid collection.  Chronic thickening of the plantar aponeurosis.   Impression:       1. Acute osteomyelitis of the posterior 2/3 of the calcaneus with a large overlying soft tissue ulcer that involves the distal insertional fibers of the Achilles tendon.  2. Early osteomyelitis of the lateral malleolus and 1st distal phalanx with overlying soft tissue ulcers.  3. Additional findings, as above.  This report was flagged in Epic as abnormal.    Electronically signed by resident: Raegan Melvin  Date: 10/01/2024  Time: 11:26    Electronically signed by: Erik Hart MD  Date: 10/01/2024  Time: 12:10   MRI Hindfoot WO Contrast RT [[id number]] (Abnormal) Resulted: 10/01/24 1210   Order Status: Completed Updated: 10/01/24 1213   Narrative:     EXAMINATION:  MRI FOREFOOT WO CONTRAST RT; MRI HINDFOOT WO CONTRAST RT; MRI MIDFOOT WO CONTRAST RT    CLINICAL HISTORY:  Osteomyelitis, foot;    TECHNIQUE:  MRI of the entire right foot with multiplanar and multisequence imaging.  No intravenous contrast was administered.    COMPARISON:  Right foot radiograph 09/30/2024, right foot CT 06/28/2024, and right foot MRI 05/16/2023.    FINDINGS:  Exam limited by motion artifact.    BONES: Confluent marrow edema throughout the posterior 2/3 of the calcaneus with corresponding T1 medullary hypointensity and suspected posterior cortical irregularity.  Comfort marrow edema throughout the lateral half of the lateral malleolus with grossly preserved cortical margins and T1 medullary signal.  Confluent marrow edema throughout the 1st distal phalanx with preserved T1 medullary signal and cortical margins.  Contour irregularity of the 5th  metatarsal shaft, possibly sequela of a healed fracture or remote infection.  No acute fractures.  No avascular necrosis.    JOINTS: Scattered hindfoot, midfoot and forefoot osteoarthritis.  No subluxation or dislocation.  No joint effusions.    TENDONS: Full-thickness, partial width tearing of the insertional fibers of the Achilles tendon.  Posterior tibial, flexor digitorum longus, flexor hallucis longus, peroneus longus, peroneus brevis and extensor tendons appear grossly intact.    MUSCLES: Severe fatty degeneration and patchy edema of the visualized musculature, likely sequela of chronic denervation:    MISCELLANEOUS: Large soft tissue ulcer overlying the posterior and lateral aspects of the calcaneus.  Soft tissue ulceration overlying the lateral malleolus and 1st distal phalanx.  Generalized subcutaneous edema most pronounced along the dorsum of the foot.  No fluid collection.  Chronic thickening of the plantar aponeurosis.   Impression:       1. Acute osteomyelitis of the posterior 2/3 of the calcaneus with a large overlying soft tissue ulcer that involves the distal insertional fibers of the Achilles tendon.  2. Early osteomyelitis of the lateral malleolus and 1st distal phalanx with overlying soft tissue ulcers.  3. Additional findings, as above.  This report was flagged in Epic as abnormal.    Electronically signed by resident: Raegan Melvin  Date: 10/01/2024  Time: 11:26    Electronically signed by: Erik Hart MD  Date: 10/01/2024  Time: 12:10   MRI Forefoot WO Contrast RT [[id number]] (Abnormal) Resulted: 10/01/24 1210   Order Status: Completed Updated: 10/01/24 1213   Narrative:     EXAMINATION:  MRI FOREFOOT WO CONTRAST RT; MRI HINDFOOT WO CONTRAST RT; MRI MIDFOOT WO CONTRAST RT    CLINICAL HISTORY:  Osteomyelitis, foot;    TECHNIQUE:  MRI of the entire right foot with multiplanar and multisequence imaging.  No intravenous contrast was administered.    COMPARISON:  Right foot radiograph 09/30/2024,  right foot CT 06/28/2024, and right foot MRI 05/16/2023.    FINDINGS:  Exam limited by motion artifact.    BONES: Confluent marrow edema throughout the posterior 2/3 of the calcaneus with corresponding T1 medullary hypointensity and suspected posterior cortical irregularity.  Comfort marrow edema throughout the lateral half of the lateral malleolus with grossly preserved cortical margins and T1 medullary signal.  Confluent marrow edema throughout the 1st distal phalanx with preserved T1 medullary signal and cortical margins.  Contour irregularity of the 5th metatarsal shaft, possibly sequela of a healed fracture or remote infection.  No acute fractures.  No avascular necrosis.    JOINTS: Scattered hindfoot, midfoot and forefoot osteoarthritis.  No subluxation or dislocation.  No joint effusions.    TENDONS: Full-thickness, partial width tearing of the insertional fibers of the Achilles tendon.  Posterior tibial, flexor digitorum longus, flexor hallucis longus, peroneus longus, peroneus brevis and extensor tendons appear grossly intact.    MUSCLES: Severe fatty degeneration and patchy edema of the visualized musculature, likely sequela of chronic denervation:    MISCELLANEOUS: Large soft tissue ulcer overlying the posterior and lateral aspects of the calcaneus.  Soft tissue ulceration overlying the lateral malleolus and 1st distal phalanx.  Generalized subcutaneous edema most pronounced along the dorsum of the foot.  No fluid collection.  Chronic thickening of the plantar aponeurosis.   Impression:       1. Acute osteomyelitis of the posterior 2/3 of the calcaneus with a large overlying soft tissue ulcer that involves the distal insertional fibers of the Achilles tendon.  2. Early osteomyelitis of the lateral malleolus and 1st distal phalanx with overlying soft tissue ulcers.  3. Additional findings, as above.  This report was flagged in Epic as abnormal.    Electronically signed by resident: Raegan Melvin  Date:  10/01/2024  Time: 11:26    Electronically signed by: Erik Hart MD  Date: 10/01/2024  Time: 12:10   US Lower Extremity Arteries Bilateral [[id number]] Resulted: 09/30/24 1712   Order Status: Completed Updated: 09/30/24 1715   Narrative:     EXAMINATION:  US LOWER EXTREMITY ARTERIES BILATERAL    CLINICAL HISTORY:  wound healing;    TECHNIQUE:  Bilateral spectral, color and grayscale images of the large arteries of both lower extremities were performed.    COMPARISON:  Multiple ultrasounds, most recent 07/26/2024    FINDINGS:  Right lower extremity: Peak systolic velocity in the right lower extremity arterial system measures 99 cm/sec in the anterior tibial artery.  No evidence of a hemodynamically significant stenosis.  There are triphasic waveforms throughout most of the right lower extremity.  There are monophasic waveforms in the posterior tibial artery.    Left lower extremity: History of above the knee amputation.  Peak systolic velocity in the left lower extremity arterial system measures 80 cm/sec in the deep femoral artery.  No evidence of a hemodynamically significant stenosis.  There are triphasic and biphasic waveforms throughout most of the left lower extremity.  There are monophasic waveforms in the distal femoral artery.    Miscellaneous: Arrhythmia.  Prominent right inguinal lymph node measuring 0.9 cm short axis, presumably reactive.   Impression:       Left above the knee amputation.    No hemodynamically significant arterial stenosis in either lower extremity.    Arrhythmia.  Correlate with EKG.      Electronically signed by: Rishi Ramos  Date: 09/30/2024  Time: 17:12   X-Ray Foot Complete Right [[id number]] (Abnormal) Resulted: 09/30/24 1426   Order Status: Completed Updated: 09/30/24 1429   Narrative:     EXAMINATION:  XR FOOT COMPLETE 3 VIEW RIGHT    CLINICAL HISTORY:  R heel wound;.    TECHNIQUE:  AP, lateral, and oblique views of the right foot were  performed.    COMPARISON:  06/28/2024    FINDINGS:  Three views right foot.    There is osteopenia.  There is remote injury of the 5th metatarsal.  There is motion blurring.  There is questionable periosteal reaction about the distal aspect of the 5th metatarsal versus motion blurring.  There is edema about the foot.  There are degenerative changes of the calcaneus as well as of the dorsal foot.  There is vascular calcification.  There is skin wound overlying the calcaneus.   Impression:       This report was flagged in Epic as abnormal.    1. Questionable indistinctness about the distal aspect of the 5th metatarsal, may reflect subacute injury to the region however infectious process not excluded.  Please note, evaluation is limited given motion blurring in the region.  Correlation is advised.  2. Diffuse edema about the foot noting skin wound at the level of the calcaneus.  Correlation is needed to exclude exposed bone.  3. There appears to be increased erosive change of the posterior aspect of the calcaneus since the previous examination.  Underlying or chronic osteomyelitis not excluded.      Electronically signed by: Milton Dove MD  Date: 09/30/2024  Time: 14:26   X-Ray Chest AP Portable [[id number]] Resulted: 09/29/24 1759   Order Status: Completed Updated: 09/29/24 1801   Narrative:     EXAMINATION:  XR CHEST AP PORTABLE    CLINICAL HISTORY:  wound infection;    TECHNIQUE:  Single frontal view of the chest was performed.    COMPARISON:  07/25/2024    FINDINGS:  The cardiomediastinal silhouette is not enlarged.  There is elevation of the right hemidiaphragm..  There is no pleural effusion.  The trachea is midline.  The lungs are symmetrically expanded bilaterally with coarse interstitial attenuation.  No large focal consolidation seen.  There is no pneumothorax.  The osseous structures are remarkable for degenerative change..   Impression:       Course interstitial attenuation, similar to the prior exam.   Superimposed edema is a consideration.      Electronically signed by: Milton Dove MD  Date: 09/29/2024  Time: 17:59      Imaging History     2024    Date Procedure Name Study Review Link PACS Link Status Accession Number Location   10/03/24 10:22 AM Cardiac monitoring strips Study Review  Final     10/02/24 07:55 PM Cardiac monitoring strips Study Review  Final     10/02/24 03:09 PM Cardiac monitoring strips Study Review  Final     10/01/24 10:22 PM Cardiac monitoring strips Study Review  Final     10/01/24 11:23 AM MRI Hindfoot WO Contrast RT Study Review  Images Final [id number] [facility]   10/01/24 11:23 AM MRI Midfoot WO Contrast RT Study Review  Images Final [id number] [facility]   10/01/24 11:23 AM MRI Forefoot WO Contrast RT Study Review  Images Final [id number] [facility]   10/01/24 09:44 AM Cardiac monitoring strips Study Review  Final     09/30/24 11:22 PM Cardiac monitoring strips Study Review  Final     09/30/24 05:36 PM Cardiac monitoring strips Study Review  Final     09/30/24 04:54 PM US Lower Extremity Arteries Bilateral Study Review  Images Final [id number] [facility]   09/30/24 01:10 PM X-Ray Foot Complete Right Study Review  Images Final [id number] [facility]   09/29/24 10:35 PM Cardiac monitoring strips Study Review  Final     09/29/24 05:10 PM X-Ray Chest AP Portable Study Review  Images Final [id number] [facility]

## 2024-10-08 NOTE — PROCEDURES
"Saji Castañeda is a 74 y.o. male patient.    Temp: 97.4 °F (36.3 °C) (10/08/24 0820)  Pulse: 81 (10/08/24 0820)  Resp: 18 (10/08/24 0820)  BP: 130/64 (10/08/24 0838)  SpO2: 97 % (10/08/24 0820)  Weight: 83.4 kg (183 lb 13.8 oz) (09/29/24 2000)  Height: 5' 9" (175.3 cm) (09/29/24 2000)    PICC  Date/Time: 10/8/2024 10:40 AM  Performed by: Louise Knott RN  Assisting provider: Serge Bennett RN  Consent Done: Yes  Time out: Immediately prior to procedure a time out was called to verify the correct patient, procedure, equipment, support staff and site/side marked as required  Indications: med administration and vascular access  Anesthesia: local infiltration  Local anesthetic: lidocaine 1% without epinephrine  Anesthetic Total (mL): 3  Description of findings: PICC  Preparation: skin prepped with ChloraPrep  Skin prep agent dried: skin prep agent completely dried prior to procedure  Sterile barriers: all five maximum sterile barriers used - cap, mask, sterile gown, sterile gloves, and large sterile sheet  Hand hygiene: hand hygiene performed prior to central venous catheter insertion  Location details: left brachial  Catheter type: double lumen  Catheter size: 5 Fr  Catheter Length: 46cm    Ultrasound guidance: yes  Vessel Caliber: medium and patent, compressibility normal  Vascular Doppler: not done  Needle advanced into vessel with real time Ultrasound guidance.  Guidewire confirmed in vessel.  Image recorded and saved.  Sterile sheath used.  Number of attempts: 1  Post-procedure: blood return through all ports, chlorhexidine patch and sterile dressing applied  Technical procedures used: 3CG  Specimens: No  Implants: No  Complications: none  Other complications: Unable to get tip central from RUE.        Name Louise Knott RN  10/8/2024    "

## 2024-10-08 NOTE — ASSESSMENT & PLAN NOTE
Pt has failed multiple void trials in the past, now with chronic fung. Most recently replaced 9/29. C/f UTI  UA shows +1 blood, 3+ leuk esterase, >100 WBCs and few bacteria, patient reports episode of dysuria yesterday and suprapubic pain on palpation     - Urine cultures NGTD

## 2024-10-08 NOTE — ASSESSMENT & PLAN NOTE
Creatine stable for now. BMP reviewed- noted Estimated Creatinine Clearance: 58 mL/min (based on SCr of 1.1 mg/dL). according to latest data. Based on current GFR, CKD stage is stage 3 - GFR 30-59.  Monitor UOP and serial BMP and adjust therapy as needed. Renally dose meds. Avoid nephrotoxic medications and procedures.    - hold nephrotoxic medications  - hold Lasix at this time

## 2024-10-08 NOTE — PROGRESS NOTES
[facility] ([place] [id number])    Wound Care     Patient Name:  Saji Castañeda  MRN:  [id number]  Date: 10/8/2024  Diagnosis: Osteomyelitis     History:  Past Medical History:   Diagnosis Date    Arthritis     legs    Bacteremia due to Gram-negative bacteria 3/23/2021    Diabetes mellitus     Diabetes mellitus, type 2     Hyperlipidemia     Hypertension     Osteomyelitis     Palliative care encounter 5/24/2023     Social History     Socioeconomic History    Marital status: Single   Tobacco Use    Smoking status: Never    Smokeless tobacco: Never   Substance and Sexual Activity    Alcohol use: No     Comment: occassional    Drug use: No    Sexual activity: Not Currently   Social History Narrative    Not currently working; lives with family     Social Drivers of Health     Financial Resource Strain: Low Risk  (10/2/2024)    Overall Financial Resource Strain (CARDIA)     Difficulty of Paying Living Expenses: Not hard at all   Food Insecurity: No Food Insecurity (10/2/2024)    Hunger Vital Sign     Worried About Running Out of Food in the Last Year: Never true     Ran Out of Food in the Last Year: Never true   Transportation Needs: No Transportation Needs (10/2/2024)    TRANSPORTATION NEEDS     Transportation : No   Physical Activity: Insufficiently Active (9/30/2024)    Exercise Vital Sign     Days of Exercise per Week: 3 days     Minutes of Exercise per Session: 20 min   Stress: Stress Concern Present (10/2/2024)    [employer] - Occupational Stress Questionnaire     Feeling of Stress : To some extent   Housing Stability: Low Risk  (10/2/2024)    Housing Stability Vital Sign     Unable to Pay for Housing in the Last Year: No     Homeless in the Last Year: No     Precautions:  Allergies as of 09/29/2024    (No Known Allergies)       WOC Assessment Details / Treatment:    Patient seen for wound care: Follow-up   Chart reviewed for this encounter.   Labs:   WBC (K/uL)   Date Value  "  10/08/2024 9.10   10/07/2024 11.21     Glucose (mg/dL)   Date Value   10/08/2024 146 (H)   10/07/2024 119 (H)     Albumin (g/dL)   Date Value   10/08/2024 1.5 (L)   10/07/2024 1.5 (L)       Tomas Score: 14  Wounds are POA   Chart review reveals pt is bowel incontinent and has ureteral catheter in place.    Narrative:  Pt seen for WC consultation / follow-up and agreed to assessment  Chart reviewed for this encounter.   See Flow Sheet for additional documentation and media.  Podiatry is following for Leg / foot / toe wounds and has orders in place.     Pt laying in [facility] bed with waffle in place, chart review reveals immerse has been ordered since 9/30, bed re-ordered today --confirmation number [id number]    Bedside RN present for assessment / assistance in turning revealing BM, pt cleansed w/ purple bath wipes and baby wipes at bedside, stool under dressing, dressing removed revealing several open wounds, flow sheets below detail.     All sacral wounds had wet to dry dressing w/ABD pad secured w/paper tape, bedside RN applied dressing per current orders.    No change in orders.    Spoke to pt about diverting ostomy to assist in wound healing, pt agreed, MD notified and placed consult.     RECOMMENDATIONS:  Bedside nurse assess for acute changes (purulence, increased redness/swelling, increased drainage, malodor, increased pain, pallor, necrosis) please contact physician on any acute changes.    Ensure bed settings are correct:     "Immerse"  Generally each green bar = 50lbs, this pt weight 175lbs, therefore 3 or 4 green bars should be appropriate for patient immersion.     Please use nursing judgement to ensure pt is immersed.     Continue implementing Q2H turn protocol.   Use wedge if needed.     Discussed POC with patient and primary nurse.   See EMR for orders & patient education.     Discussed nutrition and the role of protein in wound healing with the patient. Instructed patient to optimize protein for wound " healing.     Bedside nursing to continue care & monitoring.  Bedside nursing to maintain pressure injury prevention interventions    Thank you for the consult. Wound Care will continue to follow.     10/08/24 0917        Wound 06/27/24 1710 Pressure Injury  Sacral spine   Date First Assessed/Time First Assessed: 06/27/24 1710   Present on Original Admission: Yes  Primary Wound Type: Pressure Injury  Side: (c)   Location: (c) Sacral spine   Wound Image    Pressure Injury Stage U   Dressing Appearance Moist drainage   Drainage Amount Moderate   Drainage Characteristics/Odor Serosanguineous;No odor   Appearance Red;Pink;Yellow;Necrotic;Moist   Tissue loss description Not applicable   Red (%), Wound Tissue Color 60 %   Yellow (%), Wound Tissue Color 40 %   Periwound Area Intact;Moist   Wound Edges Irregular   Wound Length (cm) 13 cm   Wound Width (cm) 16.5 cm   Wound Depth (cm) 1 cm   Wound Volume (cm^3) 214.5 cm^3   Wound Surface Area (cm^2) 214.5 cm^2   Care Cleansed with:;Antimicrobial agent   Dressing Removed;Gauze, wet to dry;Applied;Silver;Hydrofiber;Foam   Off Loading Other (see comments)  (Immerse ordered)   Dressing Change Due 10/09/24        Wound 05/09/24 Pressure Injury Right Ischial tuberosity   Date First Assessed: 05/09/24   Present on Original Admission: Yes  Primary Wound Type: Pressure Injury  Side: Right  Location: Ischial tuberosity   Wound Image    Pressure Injury Stage 4   Dressing Appearance Moist drainage   Drainage Amount Small   Drainage Characteristics/Odor Serosanguineous;No odor   Appearance Pink;Red;Moist;Bone   Tissue loss description Full thickness   Red (%), Wound Tissue Color 100 %   Periwound Area Intact   Wound Edges Defined   Wound Length (cm) 7 cm   Wound Width (cm) 3 cm   Wound Depth (cm) 3 cm   Wound Volume (cm^3) 63 cm^3   Wound Surface Area (cm^2) 21 cm^2   Undermining (depth (cm)/location) 5.5 11-3 o'clock   Care Cleansed with:;Antimicrobial agent   Dressing Removed;Gauze, wet  to dry;Applied;Silver;Hydrofiber;Foam   Off Loading Other (see comments)  (Immerse ordered)   Dressing Change Due 10/09/24        Wound 09/30/24 1100 Pressure Injury Left Ischial tuberosity   Date First Assessed/Time First Assessed: 09/30/24 1100   Present on Original Admission: Yes  Primary Wound Type: Pressure Injury  Side: Left  Location: Ischial tuberosity   Wound Image    Pressure Injury Stage 4   Dressing Appearance Moist drainage   Drainage Amount Large   Drainage Characteristics/Odor Serosanguineous;No odor   Appearance Red;Pink;Yellow;Necrotic;Moist;Bone   Tissue loss description Full thickness   Red (%), Wound Tissue Color 95 %   Yellow (%), Wound Tissue Color 5 %   Periwound Area Intact   Wound Edges Rolled/closed   Wound Length (cm) 4.5 cm   Wound Width (cm) 3 cm   Wound Depth (cm) 3 cm   Wound Volume (cm^3) 40.5 cm^3   Wound Surface Area (cm^2) 13.5 cm^2   Undermining (depth (cm)/location) 5.0 12-12 o'clock   Care Cleansed with:;Sterile normal saline   Dressing Removed;Gauze, wet to dry;Applied;Silver;Hydrofiber;Foam   Off Loading Other (see comments)  (Immerse ordered)   Dressing Change Due 10/09/24        Wound 09/30/24 1100 Skin Tear Right lateral Hip   Date First Assessed/Time First Assessed: 09/30/24 1100   Present on Original Admission: Yes  Primary Wound Type: Skin Tear  Side: Right  Orientation: lateral  Location: Hip   Wound Image    Dressing Appearance Moist drainage   Drainage Amount Small   Drainage Characteristics/Odor Serosanguineous;No odor   Appearance Pink;Moist;Granulating   Tissue loss description Partial thickness   Red (%), Wound Tissue Color 100 %   Periwound Area Intact;Dry   Wound Edges Defined   Wound Length (cm) 3.5 cm   Wound Width (cm) 4.4 cm   Wound Depth (cm) 0.1 cm   Wound Volume (cm^3) 1.54 cm^3   Wound Surface Area (cm^2) 15.4 cm^2   Care Cleansed with:;Antimicrobial agent   Dressing Removed;Foam;Applied;Silver;Hydrofiber   Dressing Change Due 10/13/24

## 2024-10-08 NOTE — CONSULTS
Double lumen PICC to left brachial vein.  46 cm in length, 27 cm arm circumference and 0 cm exposed.   Lot # [id number].

## 2024-10-08 NOTE — ASSESSMENT & PLAN NOTE
Wound care recommended diverting ostomy to assist in wound healing. Consulted CRS, appreciate assistance. However, patient not interested in procedure at this time.

## 2024-10-08 NOTE — PLAN OF CARE
Patient in bed, asleep, easily arousable, AAOx4. No complaints or concerns at this time. Turn q2. Safety precautions maintained and call light in reach.       Problem: Pain Acute  Goal: Optimal Pain Control and Function  Outcome: Progressing     Problem: Fall Injury Risk  Goal: Absence of Fall and Fall-Related Injury  Outcome: Progressing     Problem: Skin Injury Risk Increased  Goal: Skin Health and Integrity  Outcome: Progressing

## 2024-10-08 NOTE — PROGRESS NOTES
[facility] ([place]-14)  Infectious Disease  Progress Note    Patient Name: Saji Castañeda  MRN: [id number]  Admission Date: 9/29/2024  Length of Stay: 8 days  Attending Physician: Seymour Betancourt MD  Primary Care Provider: Vinod Elise MD    Isolation Status: Contact  Assessment/Plan:      ID  * Osteomyelitis  75 yo male admitted with R heel wound with MRI showing calcaneal osteo and pelvic wounds.  L hip wound debrided and cultures show proteus m. Per gen sx, L hip wound does not probe to bone. POD following.      10/03/2024: Afebrile and WBC WNL.  Per general surgery note, left hip wound still with purulence.  Podiatry performed bone biopsy 10/03/2024 on right heel.  Patient reports slight subjective improvement.  Patient had been previously treated for right heel osteo ending 08/12/2024 by [facility] for MRSA and  Pseudomonas infection.  Possible that R foot MRI findings are residual from prior infection and artifact.    10/4/24: Stable.  MRI pelvis with osteomyelitis.  R heel bone biopsy done.  Cultures pending.  Gen Sx signed off re: sacral/L hip wounds    10/7/24: Stable.  R heel bone with Staph caprae and pan sensitive Pseudomonas.  L hip wound cx with proteus M - FQ resistant - no anaerobic cx done.  CRP down to 76 from 114    10/8/24:  Stable.  No acute events.  Staph caprae and pseudomonas from R calcaneal bone and L hip wound with proteus m    Plan:  Continue daptomycin and zosyn  PICC placement and plan for long term abx  Rec O SNF/LTAC  - Consult ID to follow while there  Will follow  Seen with ID staff  FU in ID clinic post dc from snf/ltac or 14d if does not go to LTAC/SNF    Outpatient Antibiotic Therapy Plan:        1) Infection: 1) R heel osteomyelitis 2) Pelvic osteomyelitis    2) Discharge Antibiotics:    Intravenous antibiotics:  Zosyn 4.5g IV over 4hrs every 8h (Alternative - can do a 13.5g continuous infusion over 24h)  Daptomcyin 8mg/kg IV q24h    Oral  antibiotics:  none    3) Therapy Duration:  6 weeks    Estimated end date of IV antibiotics: 11/10/24    4) Outpatient Weekly Labs:    Order the following labs to be drawn on Mondays:   CBC  CMP   CRP    CPK (when on Daptomycin)e.    5) Fax Lab Results to Infectious Diseases Provider: Patel Stein PA-C    [facility] Fax Number: [phone]    6) Outpatient Infectious Diseases Follow-up    Follow-up appointment will be arranged by the ID clinic and will be found in the patient's appointments tab.    Prior to discharge, please ensure the patient's follow-up has been scheduled.    If there is still no follow-up scheduled prior to discharge, please send an [employer] message to [facility] Clinical Pool or Call Infectious Diseases Dept.                Anticipated Disposition: tbd    Thank you for your consult. I will sign off. Please contact us if you have any additional questions.    JOHNNY Goel  Infectious Disease  [facility] Stepdown Flex ([place]-14)    Subjective:     Principal Problem:Osteomyelitis    HPI: Saji Castañeda is a 74 y.o. 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 Arellano,anemia, BPH s/p Chronic Sams, paroxysmal A-fib on eliquis, CHB s/p PPM medtronic (10/12/2023), CKD3, HTN, HLD and Osteomyelitis of R heel presenting from [facility] with suspected infection of L hip wound. He is nonambulatory . Patient has chronic, extensive sacral wounds, a R heel wound and a wound on the L hip with purulence and a foul odor. The patient was recommended to come to the ED due to concerns of wound infection by nursing staff at NH. Patient reports intermittent pain at the wound sites that is controlled with Oxycodone.  Notably the patient was admitted to the ED on 7/25 for AMS while on Vanc and Cefepime for Osteomyelitis. Cefepime neurotoxicity was suspected and therapy was switched to Vanc and Zosyn then Dapto/Zosyn with  and EOC of 8/12/24.      He has been seen by gen sx and L hip debrided and cultures sent showing proteus m.  UCX - NG and blood cultures NGTD.  Podiatry following for R foot and MRI R foot shows: 1. Acute osteomyelitis of the posterior 2/3 of the calcaneus with a large overlying soft tissue ulcer that involves the distal insertional fibers of the Achilles tendon. 2. Early osteomyelitis of the lateral malleolus and 1st distal phalanx with overlying soft tissue ulcers. He has been treated with Zosyn.  ID consulted for abx recs.    Interval History:   No acute events  Afebrile and white blood cell count normal  Blood cultures no growth to date  L hip wound culture - Proteus mirabilis  Right heel bone culture - Staph caprea/Pseudomonas  Sacral and pain slightly improved  Denies fevers chills sweats    Review of Systems   Constitutional:  Negative for chills, diaphoresis and fever.   Respiratory:  Negative for shortness of breath.    Cardiovascular:  Negative for chest pain.   Gastrointestinal:  Negative for abdominal pain, diarrhea, nausea and vomiting.   Genitourinary:  Negative for dysuria and hematuria.   Skin:  Positive for wound.     Objective:     Vital Signs (Most Recent):  Temp: 97.4 °F (36.3 °C) (10/08/24 0820)  Pulse: 81 (10/08/24 0820)  Resp: 18 (10/08/24 0820)  BP: 130/64 (10/08/24 0838)  SpO2: 97 % (10/08/24 0820) Vital Signs (24h Range):  Temp:  [97.4 °F (36.3 °C)-98.3 °F (36.8 °C)] 97.4 °F (36.3 °C)  Pulse:  [71-83] 81  Resp:  [12-18] 18  SpO2:  [97 %-100 %] 97 %  BP: (108-130)/(55-72) 130/64     Weight: 83.4 kg (183 lb 13.8 oz)  Body mass index is 27.15 kg/m².    Estimated Creatinine Clearance: 58 mL/min (based on SCr of 1.1 mg/dL).     Physical Exam  Constitutional:       General: He is not in acute distress.     Appearance: He is well-developed. He is ill-appearing. He is not toxic-appearing or diaphoretic.       HENT:      Head: Normocephalic and atraumatic.   Cardiovascular:      Rate and Rhythm:  Normal rate and regular rhythm.      Heart sounds: Normal heart sounds. No murmur heard.     No friction rub. No gallop.   Pulmonary:      Effort: Pulmonary effort is normal. No respiratory distress.      Breath sounds: Normal breath sounds. No wheezing or rales.   Abdominal:      General: Bowel sounds are normal. There is no distension.      Palpations: Abdomen is soft. There is no mass.      Tenderness: There is no abdominal tenderness. There is no guarding or rebound.   Skin:     General: Skin is warm and dry.   Neurological:      Mental Status: He is alert and oriented to person, place, and time.   Psychiatric:         Behavior: Behavior normal.       10/4/24 above                       Significant Labs: Blood Culture:   Recent Labs   Lab 06/28/24  2251 06/28/24  2252 07/25/24 2114 07/25/24 2115 09/29/24  1446   LABBLOO No growth after 5 days. No growth after 5 days. No growth after 5 days. No growth after 5 days. No growth after 5 days.  No growth after 5 days.     CBC:   Recent Labs   Lab 10/07/24  0801 10/08/24  0538   WBC 11.21 9.10   HGB 8.4* 8.6*   HCT 30.5* 26.6*   * 639*     CMP:   Recent Labs   Lab 10/07/24  0801 10/08/24  0538    141   K 5.0 4.5    107   CO2 23 23   * 146*   BUN 14 16   CREATININE 0.9 1.1   CALCIUM 8.7 9.0   PROT 7.0 7.0   ALBUMIN 1.5* 1.5*   BILITOT 0.2 0.3   ALKPHOS 135 129   AST 40 35   ALT 17 16   ANIONGAP 7* 11     Urine Culture:   Recent Labs   Lab 05/08/24  2121 05/19/24  2102 07/25/24  1257 09/29/24  1722   LABURIN PROVIDENCIA STUARTII  >100,000 cfu/ml  * CANDIDA GLABRATA  10,000 - 49,999 cfu/ml  Treatment of asymptomatic candiduria is not recommended (except for   specific populations). Candida isolated in the urine typically   represents colonization. If an indwelling urinary catheter is present  it should be removed or replaced.  * No growth No growth     Urine Studies:   Recent Labs   Lab 08/05/24  1515 09/29/24  1722   COLORU Yellow Colorless*    APPEARANCEUA Hazy* Clear   PHUR 6.0 6.0   SPECGRAV 1.015 1.010   PROTEINUA 1+* Negative   GLUCUA Negative Negative   KETONESU Negative Negative   BILIRUBINUA Negative Negative   OCCULTUA 1+* 1+*   NITRITE Negative Negative   UROBILINOGEN Negative  --    LEUKOCYTESUR 3+* 3+*   RBCUA 25* 15*   WBCUA >100* >100*   BACTERIA Occasional Few*   SQUAMEPITHEL  --  1   HYALINECASTS 0  --      Wound Culture:   Recent Labs   Lab 07/01/24  1241 09/29/24  1628 10/03/24  1303   LABAERO METHICILLIN RESISTANT STAPHYLOCOCCUS AUREUS  Few  *  PSEUDOMONAS AERUGINOSA   Few  * PROTEUS MIRABILIS  Few  Skin bossman also present  * STAPHYLOCOCCUS CAPRAE  Few  *  PSEUDOMONAS AERUGINOSA  Few  Skin bossman also present  *     All pertinent labs within the past 24 hours have been reviewed.    Significant Imaging: I have reviewed all pertinent imaging results/findings within the past 24 hours.  MRI Midfoot WO Contrast RT [[id number]] (Abnormal) Resulted: 10/01/24 1210   Order Status: Completed Updated: 10/01/24 1213   Narrative:     EXAMINATION:  MRI FOREFOOT WO CONTRAST RT; MRI HINDFOOT WO CONTRAST RT; MRI MIDFOOT WO CONTRAST RT    CLINICAL HISTORY:  Osteomyelitis, foot;    TECHNIQUE:  MRI of the entire right foot with multiplanar and multisequence imaging.  No intravenous contrast was administered.    COMPARISON:  Right foot radiograph 09/30/2024, right foot CT 06/28/2024, and right foot MRI 05/16/2023.    FINDINGS:  Exam limited by motion artifact.    BONES: Confluent marrow edema throughout the posterior 2/3 of the calcaneus with corresponding T1 medullary hypointensity and suspected posterior cortical irregularity.  Comfort marrow edema throughout the lateral half of the lateral malleolus with grossly preserved cortical margins and T1 medullary signal.  Confluent marrow edema throughout the 1st distal phalanx with preserved T1 medullary signal and cortical margins.  Contour irregularity of the 5th metatarsal shaft, possibly sequela of a  healed fracture or remote infection.  No acute fractures.  No avascular necrosis.    JOINTS: Scattered hindfoot, midfoot and forefoot osteoarthritis.  No subluxation or dislocation.  No joint effusions.    TENDONS: Full-thickness, partial width tearing of the insertional fibers of the Achilles tendon.  Posterior tibial, flexor digitorum longus, flexor hallucis longus, peroneus longus, peroneus brevis and extensor tendons appear grossly intact.    MUSCLES: Severe fatty degeneration and patchy edema of the visualized musculature, likely sequela of chronic denervation:    MISCELLANEOUS: Large soft tissue ulcer overlying the posterior and lateral aspects of the calcaneus.  Soft tissue ulceration overlying the lateral malleolus and 1st distal phalanx.  Generalized subcutaneous edema most pronounced along the dorsum of the foot.  No fluid collection.  Chronic thickening of the plantar aponeurosis.   Impression:       1. Acute osteomyelitis of the posterior 2/3 of the calcaneus with a large overlying soft tissue ulcer that involves the distal insertional fibers of the Achilles tendon.  2. Early osteomyelitis of the lateral malleolus and 1st distal phalanx with overlying soft tissue ulcers.  3. Additional findings, as above.  This report was flagged in Epic as abnormal.    Electronically signed by resident: Raegan Melvin  Date: 10/01/2024  Time: 11:26    Electronically signed by: Erik Hart MD  Date: 10/01/2024  Time: 12:10   MRI Hindfoot WO Contrast RT [[id number]] (Abnormal) Resulted: 10/01/24 1210   Order Status: Completed Updated: 10/01/24 1213   Narrative:     EXAMINATION:  MRI FOREFOOT WO CONTRAST RT; MRI HINDFOOT WO CONTRAST RT; MRI MIDFOOT WO CONTRAST RT    CLINICAL HISTORY:  Osteomyelitis, foot;    TECHNIQUE:  MRI of the entire right foot with multiplanar and multisequence imaging.  No intravenous contrast was administered.    COMPARISON:  Right foot radiograph 09/30/2024, right foot CT 06/28/2024, and right  foot MRI 05/16/2023.    FINDINGS:  Exam limited by motion artifact.    BONES: Confluent marrow edema throughout the posterior 2/3 of the calcaneus with corresponding T1 medullary hypointensity and suspected posterior cortical irregularity.  Comfort marrow edema throughout the lateral half of the lateral malleolus with grossly preserved cortical margins and T1 medullary signal.  Confluent marrow edema throughout the 1st distal phalanx with preserved T1 medullary signal and cortical margins.  Contour irregularity of the 5th metatarsal shaft, possibly sequela of a healed fracture or remote infection.  No acute fractures.  No avascular necrosis.    JOINTS: Scattered hindfoot, midfoot and forefoot osteoarthritis.  No subluxation or dislocation.  No joint effusions.    TENDONS: Full-thickness, partial width tearing of the insertional fibers of the Achilles tendon.  Posterior tibial, flexor digitorum longus, flexor hallucis longus, peroneus longus, peroneus brevis and extensor tendons appear grossly intact.    MUSCLES: Severe fatty degeneration and patchy edema of the visualized musculature, likely sequela of chronic denervation:    MISCELLANEOUS: Large soft tissue ulcer overlying the posterior and lateral aspects of the calcaneus.  Soft tissue ulceration overlying the lateral malleolus and 1st distal phalanx.  Generalized subcutaneous edema most pronounced along the dorsum of the foot.  No fluid collection.  Chronic thickening of the plantar aponeurosis.   Impression:       1. Acute osteomyelitis of the posterior 2/3 of the calcaneus with a large overlying soft tissue ulcer that involves the distal insertional fibers of the Achilles tendon.  2. Early osteomyelitis of the lateral malleolus and 1st distal phalanx with overlying soft tissue ulcers.  3. Additional findings, as above.  This report was flagged in Epic as abnormal.    Electronically signed by resident: Raegan Melvin  Date: 10/01/2024  Time: 11:26    Electronically  signed by: Erik Hart MD  Date: 10/01/2024  Time: 12:10   MRI Forefoot WO Contrast RT [[id number]] (Abnormal) Resulted: 10/01/24 1210   Order Status: Completed Updated: 10/01/24 1213   Narrative:     EXAMINATION:  MRI FOREFOOT WO CONTRAST RT; MRI HINDFOOT WO CONTRAST RT; MRI MIDFOOT WO CONTRAST RT    CLINICAL HISTORY:  Osteomyelitis, foot;    TECHNIQUE:  MRI of the entire right foot with multiplanar and multisequence imaging.  No intravenous contrast was administered.    COMPARISON:  Right foot radiograph 09/30/2024, right foot CT 06/28/2024, and right foot MRI 05/16/2023.    FINDINGS:  Exam limited by motion artifact.    BONES: Confluent marrow edema throughout the posterior 2/3 of the calcaneus with corresponding T1 medullary hypointensity and suspected posterior cortical irregularity.  Comfort marrow edema throughout the lateral half of the lateral malleolus with grossly preserved cortical margins and T1 medullary signal.  Confluent marrow edema throughout the 1st distal phalanx with preserved T1 medullary signal and cortical margins.  Contour irregularity of the 5th metatarsal shaft, possibly sequela of a healed fracture or remote infection.  No acute fractures.  No avascular necrosis.    JOINTS: Scattered hindfoot, midfoot and forefoot osteoarthritis.  No subluxation or dislocation.  No joint effusions.    TENDONS: Full-thickness, partial width tearing of the insertional fibers of the Achilles tendon.  Posterior tibial, flexor digitorum longus, flexor hallucis longus, peroneus longus, peroneus brevis and extensor tendons appear grossly intact.    MUSCLES: Severe fatty degeneration and patchy edema of the visualized musculature, likely sequela of chronic denervation:    MISCELLANEOUS: Large soft tissue ulcer overlying the posterior and lateral aspects of the calcaneus.  Soft tissue ulceration overlying the lateral malleolus and 1st distal phalanx.  Generalized subcutaneous edema most pronounced along the  dorsum of the foot.  No fluid collection.  Chronic thickening of the plantar aponeurosis.   Impression:       1. Acute osteomyelitis of the posterior 2/3 of the calcaneus with a large overlying soft tissue ulcer that involves the distal insertional fibers of the Achilles tendon.  2. Early osteomyelitis of the lateral malleolus and 1st distal phalanx with overlying soft tissue ulcers.  3. Additional findings, as above.  This report was flagged in Epic as abnormal.    Electronically signed by resident: Raegan Melvin  Date: 10/01/2024  Time: 11:26    Electronically signed by: Erik Hart MD  Date: 10/01/2024  Time: 12:10   US Lower Extremity Arteries Bilateral [[id number]] Resulted: 09/30/24 1712   Order Status: Completed Updated: 09/30/24 1715   Narrative:     EXAMINATION:  US LOWER EXTREMITY ARTERIES BILATERAL    CLINICAL HISTORY:  wound healing;    TECHNIQUE:  Bilateral spectral, color and grayscale images of the large arteries of both lower extremities were performed.    COMPARISON:  Multiple ultrasounds, most recent 07/26/2024    FINDINGS:  Right lower extremity: Peak systolic velocity in the right lower extremity arterial system measures 99 cm/sec in the anterior tibial artery.  No evidence of a hemodynamically significant stenosis.  There are triphasic waveforms throughout most of the right lower extremity.  There are monophasic waveforms in the posterior tibial artery.    Left lower extremity: History of above the knee amputation.  Peak systolic velocity in the left lower extremity arterial system measures 80 cm/sec in the deep femoral artery.  No evidence of a hemodynamically significant stenosis.  There are triphasic and biphasic waveforms throughout most of the left lower extremity.  There are monophasic waveforms in the distal femoral artery.    Miscellaneous: Arrhythmia.  Prominent right inguinal lymph node measuring 0.9 cm short axis, presumably reactive.   Impression:       Left above the knee  amputation.    No hemodynamically significant arterial stenosis in either lower extremity.    Arrhythmia.  Correlate with EKG.      Electronically signed by: Rishi Ramos  Date: 09/30/2024  Time: 17:12   X-Ray Foot Complete Right [[id number]] (Abnormal) Resulted: 09/30/24 1426   Order Status: Completed Updated: 09/30/24 1429   Narrative:     EXAMINATION:  XR FOOT COMPLETE 3 VIEW RIGHT    CLINICAL HISTORY:  R heel wound;.    TECHNIQUE:  AP, lateral, and oblique views of the right foot were performed.    COMPARISON:  06/28/2024    FINDINGS:  Three views right foot.    There is osteopenia.  There is remote injury of the 5th metatarsal.  There is motion blurring.  There is questionable periosteal reaction about the distal aspect of the 5th metatarsal versus motion blurring.  There is edema about the foot.  There are degenerative changes of the calcaneus as well as of the dorsal foot.  There is vascular calcification.  There is skin wound overlying the calcaneus.   Impression:       This report was flagged in Epic as abnormal.    1. Questionable indistinctness about the distal aspect of the 5th metatarsal, may reflect subacute injury to the region however infectious process not excluded.  Please note, evaluation is limited given motion blurring in the region.  Correlation is advised.  2. Diffuse edema about the foot noting skin wound at the level of the calcaneus.  Correlation is needed to exclude exposed bone.  3. There appears to be increased erosive change of the posterior aspect of the calcaneus since the previous examination.  Underlying or chronic osteomyelitis not excluded.      Electronically signed by: Milton Dove MD  Date: 09/30/2024  Time: 14:26   X-Ray Chest AP Portable [[id number]] Resulted: 09/29/24 1759   Order Status: Completed Updated: 09/29/24 1801   Narrative:     EXAMINATION:  XR CHEST AP PORTABLE    CLINICAL HISTORY:  wound infection;    TECHNIQUE:  Single frontal view of the chest was  performed.    COMPARISON:  07/25/2024    FINDINGS:  The cardiomediastinal silhouette is not enlarged.  There is elevation of the right hemidiaphragm..  There is no pleural effusion.  The trachea is midline.  The lungs are symmetrically expanded bilaterally with coarse interstitial attenuation.  No large focal consolidation seen.  There is no pneumothorax.  The osseous structures are remarkable for degenerative change..   Impression:       Course interstitial attenuation, similar to the prior exam.  Superimposed edema is a consideration.      Electronically signed by: Milton Dove MD  Date: 09/29/2024  Time: 17:59      Imaging History     2024    Date Procedure Name Study Review Link PACS Link Status Accession Number Location   10/03/24 10:22 AM Cardiac monitoring strips Study Review  Final     10/02/24 07:55 PM Cardiac monitoring strips Study Review  Final     10/02/24 03:09 PM Cardiac monitoring strips Study Review  Final     10/01/24 10:22 PM Cardiac monitoring strips Study Review  Final     10/01/24 11:23 AM MRI Hindfoot WO Contrast RT Study Review  Images Final [id number] [facility]   10/01/24 11:23 AM MRI Midfoot WO Contrast RT Study Review  Images Final [id number] [facility]   10/01/24 11:23 AM MRI Forefoot WO Contrast RT Study Review  Images Final [id number] [facility]   10/01/24 09:44 AM Cardiac monitoring strips Study Review  Final     09/30/24 11:22 PM Cardiac monitoring strips Study Review  Final     09/30/24 05:36 PM Cardiac monitoring strips Study Review  Final     09/30/24 04:54 PM US Lower Extremity Arteries Bilateral Study Review  Images Final [id number] [facility]   09/30/24 01:10 PM X-Ray Foot Complete Right Study Review  Images Final [id number] [facility]   09/29/24 10:35 PM Cardiac monitoring strips Study Review  Final     09/29/24 05:10 PM X-Ray Chest AP Portable Study Review  Images Final [id number] [facility]

## 2024-10-08 NOTE — PLAN OF CARE
Problem: Pain Acute  Goal: Optimal Pain Control and Function  Outcome: Progressing     Problem: Fall Injury Risk  Goal: Absence of Fall and Fall-Related Injury  Outcome: Progressing     Problem: Skin Injury Risk Increased  Goal: Skin Health and Integrity  Outcome: Progressing

## 2024-10-08 NOTE — ASSESSMENT & PLAN NOTE
R heal OM diagnosed during admission 6/26-7/11 with bone cx positive for pseudomonas and MRSA. Discharged on vanc and cefepime for 6 week course.  Recent admission 8/2-8/6, at that time Cefepime discontinued due to c/f Cefepime neurotoxicity, then developed Vanc YNES, and discharged on Daptomycin and Zosyn. Started on vanc on arrival to ED.     - extensive grade 3-4 sacral wounds, L hip wound purulence  - chronic osteomyelitis of R heel with nonhealing ulcer    - Wound Cx from L buttocks, positive for proteus susceptible zosyn  - Wound Cx from R foot Aerobic culture, positive for staph species and Pseudomonas  - Blood Cx NGTD  - MRI R foot ordered , c/f acute osteomyelitis of 2/3 calcaneus and early osteomyelitis of lateral malleolus & 1st distal phalanx  - MRI pelvis ordered, Bilateral osteomyelitis of the posterior iliac bones    Plan:  - Wound care consulted, recommended diverting ostomy for healing wounds  - Podiatry consulted, performed bone biopsy and debridement of the R heel on 10/3   - ID consulted, rec to continue Daptomycin & Zosyn ; awaiting final antibiotic recommendations  - continue oxycodone and tylenol PRN for pain

## 2024-10-08 NOTE — ASSESSMENT & PLAN NOTE
73 yo male admitted with R heel wound with MRI showing calcaneal osteo and pelvic wounds.  L hip wound debrided and cultures show proteus m. Per gen sx, L hip wound does not probe to bone. POD following.      10/03/2024: Afebrile and WBC WNL.  Per general surgery note, left hip wound still with purulence.  Podiatry performed bone biopsy 10/03/2024 on right heel.  Patient reports slight subjective improvement.  Patient had been previously treated for right heel osteo ending 08/12/2024 by [facility] for MRSA and  Pseudomonas infection.  Possible that R foot MRI findings are residual from prior infection and artifact.    10/4/24: Stable.  MRI pelvis with osteomyelitis.  R heel bone biopsy done.  Cultures pending.  Gen Sx signed off re: sacral/L hip wounds    10/7/24: Stable.  R heel bone with Staph caprae and pan sensitive Pseudomonas.  L hip wound cx with proteus M - FQ resistant - no anaerobic cx done.  CRP down to 76 from 114    10/8/24:  Stable.  No acute events.  Staph caprae and pseudomonas from R calcaneal bone and L hip wound with proteus m    Plan:  Continue daptomycin and zosyn  PICC placement and plan for long term abx  Rec O SNF/LTAC  - Consult ID to follow while there  Will follow  Seen with ID staff  FU in ID clinic post dc from snf/ltac or 14d if does not go to LTAC/SNF    Outpatient Antibiotic Therapy Plan:        1) Infection: 1) R heel osteomyelitis 2) Pelvic osteomyelitis    2) Discharge Antibiotics:    Intravenous antibiotics:  Zosyn 4.5g IV over 4hrs every 8h (Alternative - can do a 13.5g continuous infusion over 24h)  Daptomcyin 8mg/kg IV q24h    Oral antibiotics:  none    3) Therapy Duration:  6 weeks    Estimated end date of IV antibiotics: 11/10/24    4) Outpatient Weekly Labs:    Order the following labs to be drawn on Mondays:   CBC  CMP   CRP    CPK (when on Daptomycin)e.    5) Fax Lab Results to Infectious Diseases Provider: Patel Stein PA-C    [facility] Fax Number:  [phone]    6) Outpatient Infectious Diseases Follow-up    Follow-up appointment will be arranged by the ID clinic and will be found in the patient's appointments tab.    Prior to discharge, please ensure the patient's follow-up has been scheduled.    If there is still no follow-up scheduled prior to discharge, please send an [employer] message to [facility] Clinical Pool or Call Infectious Diseases Dept.

## 2024-10-08 NOTE — CONSULTS
[facility]  Colon and Rectal Surgery    [address]  [place], [place]  Tel: [phone]  Fax: [phone]  [email or web address]   MD Seymour Galindo MD Brian Kann, MD W. Forrest Johnston, MD Matthew Giglia, MD Jennifer Paruch, MD William Kethman, MD Danielle Kay, MD     Patient name: Saji Castañeda   YOB: 1950   MRN: [id number]    As you know, Mr. Castañeda is a 74 year old man with a history of HTN, HLD, insulin-dependent DM, PVD > left AKA (3/2024), chronic anemia, BPH > chronic indwelling fung, paroxysmal atrial fibrillation, and CKD  who we were consulting regarding possible diverting colostomy to aid in wound care. He states that he is currently care dependent and not ambulatory, more challenging since his AKA. He has had these wound for over 1 year per his recollection. He denies any abdominal surgery, family history of CRC and he has reportedly never undergone a colonoscopy. He has had prior palliative care discussions dating back to 5/2024 at [facility] in review of his records and on prior hospitalization, it was indicated that he was not an operative candidate for diverting colostomy/flap wound reconstruction.    Echocardiogram - 7/26/2024    Left Ventricle: The left ventricle is normal in size. There is concentric hypertrophy. Normal wall motion. Septal motion is consistent with bundle branch block. There is normal systolic function with a visually estimated ejection fraction of 55 - 60%. Biplane (2D) method of discs ejection fraction is 50%.    Right Ventricle: Normal right ventricular cavity size. Systolic function is normal. TAPSE is 1.96 cm.    Right Atrium: Right atrium is moderately dilated.    Aortic Valve: There is mild aortic regurgitation.    Tricuspid Valve: There is mild regurgitation.    IVC/SVC: Normal venous pressure at 3 mmHg.    CT CAP - 5/19/2024  1. Urinary bladder wall thickening, correlate with urinalysis to exclude  cystitis.  2. Cholelithiasis without acute cholecystitis.  3. Prostatomegaly.  4. Stable mediastinal lymphadenopathy.    Lab Results   Component Value Date    ALBUMIN 1.5 (L) 10/08/2024    CREATININE 1.1 10/08/2024    HGB 8.6 (L) 10/08/2024    FERRITIN 1,600 (H) 09/30/2024    TSH 1.563 07/25/2024    CALCIUM 9.0 10/08/2024     Wt Readings from Last 3 Encounters:   09/29/24 83.4 kg (183 lb 13.8 oz)   09/12/24 97 kg (213 lb 13.5 oz)   08/04/24 80 kg (176 lb 5.9 oz)     The patient was informed of the availability of a certified  without charge. A certified  was not necessary for this visit.    Review of Systems  See pertinent review of systems above    Past Medical History:   Diagnosis Date    Arthritis     legs    Bacteremia due to Gram-negative bacteria 3/23/2021    Diabetes mellitus     Diabetes mellitus, type 2     Hyperlipidemia     Hypertension     Osteomyelitis     Palliative care encounter 5/24/2023     Past Surgical History:   Procedure Laterality Date    ABOVE-KNEE AMPUTATION Left 3/27/2024    Procedure: AMPUTATION, ABOVE KNEE;  Surgeon: Adal Arellano MD;  Location: [address];  Service: Vascular;  Laterality: Left;    ANGIOGRAPHY OF LOWER EXTREMITY N/A 2/3/2021    Procedure: Angiogram Extremity Bilateral;  Surgeon: Ernst Chacko MD;  Location: [facility] OR [address] [place] [address];  Service: Peripheral Vascular;  Laterality: N/A;  7.4 mintues fluoroscopy time  816.15 mGy  170.17 Gycm2    AORTOGRAPHY WITH EXTREMITY RUNOFF Bilateral 2/3/2021    Procedure: AORTOGRAM, WITH EXTREMITY RUNOFF;  Surgeon: Ernst Chacko MD;  Location: [facility] OR [address] [place] [address];  Service: Peripheral Vascular;  Laterality: Bilateral;    DEBRIDEMENT OF FOOT Left 2/23/2021    Procedure: DEBRIDEMENT, LEFT HEEL;  Surgeon: Mayra Schroeder DPM;  Location: [facility] OR [address] [place] [address];  Service: Podiatry;  Laterality: Left;    ESOPHAGOGASTRODUODENOSCOPY N/A 6/28/2024    Procedure: EGD (ESOPHAGOGASTRODUODENOSCOPY);   "Surgeon: Adal Chandra MD;  Location: [facility] ENDO;  Service: Gastroenterology;  Laterality: N/A;    FOOT AMPUTATION  October 2010    left high midfoot amputation    IMPLANTATION OF LEADLESS PACEMAKER N/A 10/12/2023    Procedure: [id number];  Surgeon: VANESSA Delatorre MD;  Location: [facility] EP LAB;  Service: Cardiology;  Laterality: N/A;  AVB, MICRA, EH, [place], [place] [id number]     Family History   Problem Relation Name Age of Onset    Diabetes Mother      Heart disease Mother      Stroke Sister      Cancer Brother      Diabetes Sister       Social History     Tobacco Use    Smoking status: Never    Smokeless tobacco: Never   Substance Use Topics    Alcohol use: No     Comment: occassional    Drug use: No     Review of patient's allergies indicates:  No Known Allergies    No current facility-administered medications on file prior to encounter.     Current Outpatient Medications on File Prior to Encounter   Medication Sig Dispense Refill    acetaminophen (TYLENOL) 325 MG tablet Take 650 mg by mouth every 6 (six) hours as needed for Pain.      amoxicillin-clavulanate 875-125mg (AUGMENTIN) 875-125 mg per tablet Take 1 tablet by mouth 2 (two) times daily.      apixaban (ELIQUIS) 5 mg Tab Take 1 tablet (5 mg total) by mouth 2 (two) times daily.      ascorbic acid, vitamin C, (VITAMIN C) 500 MG tablet Take 500 mg by mouth 2 (two) times daily.      BD ULTRA-FINE ADITYA PEN NEEDLE 32 gauge x 5/32" Ndle USE FOUR TIMES DAILY WITH MEALS AND NIGHTLY 100 each 0    blood sugar diagnostic (CONTOUR TEST STRIPS) Strp 1 strip by Misc.(Non-Drug; Combo Route) route 4 (four) times daily. Use to check blood glucose 4x daily 100 strip 6    diphenhydrAMINE (BENADRYL) 25 mg capsule Take 1 capsule (25 mg total) by mouth every 6 (six) hours as needed for Itching.      ferrous sulfate 325 (65 FE) MG EC tablet Take 325 mg by mouth 2 (two) times daily.      furosemide (LASIX) 20 MG tablet Take 1 tablet (20 mg total) by mouth once " daily. 30 tablet 11    gabapentin (NEURONTIN) 300 MG capsule Take 300 mg by mouth 2 (two) times daily.      insulin aspart U-100 (NOVOLOG) 100 unit/mL (3 mL) InPn pen Inject 5 Units into the skin 3 (three) times daily with meals. 3 each 11    insulin aspart U-100 (NOVOLOG) 100 unit/mL injection Inject 2-10 Units into the skin 3 (three) times daily before meals. 0-149=0  150-200: 2 units  201-250: 4 units  251-300: 6 units  301-350: 8 units  351-1000=10 units, NOTIFY MD      lancets (MICROLET LANCET) Misc 1 each by Misc.(Non-Drug; Combo Route) route 4 (four) times daily. Use to check blood sugars 4x daily  0    LANTUS SOLOSTAR U-100 INSULIN 100 unit/mL (3 mL) InPn pen Inject 17 Units into the skin 2 (two) times a day.      menthol-zinc oxide (CALMOSEPTINE) 0.44-20.6 % Oint Apply topically as needed.      multivitamin (THERAGRAN) per tablet Take 1 tablet by mouth once daily.      NIFEdipine (PROCARDIA-XL) 90 MG (OSM) 24 hr tablet Take 1 tablet (90 mg total) by mouth once daily. 30 tablet 11    nutritional supplements Powd Take 1 packet by mouth 2 (two) times a day.      oxyCODONE (ROXICODONE) 5 MG immediate release tablet Take 1 tablet (5 mg total) by mouth every 24 hours as needed for Pain. (Patient taking differently: Take 5 mg by mouth every 6 (six) hours as needed for Pain.) 30 tablet 0    pantoprazole (PROTONIX) 40 MG tablet Take 40 mg by mouth once daily. Before breakfast      potassium chloride SA (K-DUR,KLOR-CON) 20 MEQ tablet Take 20 mEq by mouth 2 (two) times daily.      sodium hypochlorite (DAKINS) external solution Apply to affected area topically one time per day      tamsulosin (FLOMAX) 0.4 mg Cap Take 0.4 mg by mouth once daily.      TRUE METRIX GLUCOSE METER Misc       zinc sulfate (ZINCATE) 50 mg zinc (220 mg) capsule Take 220 mg by mouth every morning.      [DISCONTINUED] hydroCHLOROthiazide (HYDRODIURIL) 25 MG tablet Take 0.5 tablets (12.5 mg total) by mouth every Mon, Wed, Fri. Beginning on  "7/4/2022       Physical Examination  BP (!) 140/70 (BP Location: Right arm, Patient Position: Lying)   Pulse 77   Temp 98.8 °F (37.1 °C) (Oral)   Resp 16   Ht 5' 9" (1.753 m)   Wt 83.4 kg (183 lb 13.8 oz)   SpO2 100%   BMI 27.15 kg/m²      Constitutional: well developed, no cough, no dyspnea, alert, and no acute distress    Respiratory: normal air entry  Gastrointestinal: soft, non-tender    Wounds evaluated based on images              Neurologic: alert, oriented, normal speech, no focal findings or movement disorder noted  Psychiatric: appropriate, normal mood    Assessment and Plan of Care    Thank you again for referring Mr. Castañeda to my care. In summary, Mr. Castañeda is a 74 year old man presenting with multiple co-morbidities and many chronic pressure-related wounds. We discussed treatment options including continued frequent pressure offloading, ongoing wound care and attempts at stool bulking (fiber supplementation) to make it easier to maintain perineal cleanliness, nutritional optimization and diverting ostomy (permanent). We discussed benefits and risks of colostomy creation to aid in wound care and to prevent wound contamination. Due to underlying co-morbidities, he would be high-risk for this intervention given profound nutritional deficit. After this discussion, he indicated to me that he would prefer not to undergo a colostomy. I do recommend re-involvement of palliative care with goal to provide continued patient-centered care. If he indicates otherwise, please call us for continued discussion.    Please do not hesitate to contact me if you have any questions.      Jori Back MD, FACS, FASCRS  Department of Colon & Rectal Surgery  [facility]    "

## 2024-10-08 NOTE — SUBJECTIVE & OBJECTIVE
Interval History: NAEON. Patient agreeable to having PICC line placed. No specific complaints.     Review of Systems   Constitutional:  Negative for chills and fever.   Respiratory:  Negative for shortness of breath.    Gastrointestinal:  Negative for abdominal pain, nausea and vomiting.   Genitourinary:  Negative for dysuria.   Musculoskeletal:  Positive for myalgias.   Skin:  Positive for wound.   Neurological:  Negative for dizziness and headaches.     Objective:     Vital Signs (Most Recent):  Temp: 98.8 °F (37.1 °C) (10/08/24 1605)  Pulse: 77 (10/08/24 1605)  Resp: 16 (10/08/24 1605)  BP: (!) 140/70 (10/08/24 1605)  SpO2: 100 % (10/08/24 1605) Vital Signs (24h Range):  Temp:  [97.4 °F (36.3 °C)-98.8 °F (37.1 °C)] 98.8 °F (37.1 °C)  Pulse:  [71-81] 77  Resp:  [12-18] 16  SpO2:  [95 %-100 %] 100 %  BP: (108-140)/(60-72) 140/70     Weight: 83.4 kg (183 lb 13.8 oz)  Body mass index is 27.15 kg/m².    Intake/Output Summary (Last 24 hours) at 10/8/2024 1819  Last data filed at 10/8/2024 1806  Gross per 24 hour   Intake 1264.48 ml   Output 625 ml   Net 639.48 ml         Physical Exam  Constitutional:       General: He is not in acute distress.     Appearance: He is not ill-appearing.   HENT:      Head: Normocephalic and atraumatic.   Eyes:      General: No scleral icterus.  Cardiovascular:      Rate and Rhythm: Normal rate. Rhythm irregular.      Heart sounds: No murmur heard.  Pulmonary:      Effort: Pulmonary effort is normal. No respiratory distress.      Breath sounds: Normal breath sounds.   Abdominal:      Palpations: Abdomen is soft.      Tenderness: There is no abdominal tenderness.   Musculoskeletal:      Cervical back: Normal range of motion.      Comments: S/p L AKA  Right lower extremity with cracked, dry skin as well as right heel wound. R heel dressing intact.   Skin:     General: Skin is dry.      Comments: Refer to images of wounds   Neurological:      General: No focal deficit present.      Mental  Status: He is alert.             Significant Labs: All pertinent labs within the past 24 hours have been reviewed.    Significant Imaging: I have reviewed all pertinent imaging results/findings within the past 24 hours.

## 2024-10-08 NOTE — ASSESSMENT & PLAN NOTE
Chronic, uncontrolled. Latest blood pressure and vitals reviewed    While in the hospital, will manage blood pressure as follows; Adjust home antihypertensive regimen as follows- held home Nifedipine    Will utilize p.r.n. blood pressure medication only if patient's blood pressure greater than 180/110 and he develops symptoms such as worsening chest pain or shortness of breath.    - Hold diuretics, no signs of volume overload

## 2024-10-08 NOTE — ASSESSMENT & PLAN NOTE
Anemia is likely due to chronic disease due to Chronic Kidney Disease. Most recent hemoglobin and hematocrit are listed below.  Recent Labs     10/06/24  0522 10/07/24  0801 10/08/24  0538   HGB 7.3* 8.4* 8.6*   HCT 25.3* 30.5* 26.6*       Plan  - Monitor serial CBC: daily  - Transfuse PRBC if patient becomes hemodynamically unstable, symptomatic or H/H drops below 7/21.  - Patient has received 1 units of PRBCs on 9/29  & 1 unit pRBCs on 10/3  - Likely multifactorial with AOCD, as well as potentially GI bleed vs  AVM. Continue to monitor  - Elevated ferritin, Anemia 2/2 chronic disease

## 2024-10-08 NOTE — PROGRESS NOTES
[facility] - Stepdown [facility] [place] ([place] [address])  [facility] Medicine  Progress Note    Patient Name: Saji Castañeda  MRN: [id number]  Patient Class: IP- Inpatient   Admission Date: 9/29/2024  Length of Stay: 8 days  Attending Physician: Seymour Betancourt MD  Primary Care Provider: Vinod Elise MD        Subjective:     Principal Problem:Osteomyelitis        HPI:  Saji Castañeda is a 74 y.o. 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 Arellano, Chronic Multifactorial Anemia, BPH s/p Chronic Sams, paroxysmal A-fib on eliquis, CHB s/p PPM medtronic (10/12/2023), CKD3, Multifactorial HTN, HLD and Osteomyelitis of R foot presenting [facility] with suspected infection of L hip wound. He is nonambulatory . Patient has chronic, extensive sacral wounds, a R heel wound and a wound on the L hip with purulence and a foul odor. The patient was recommended to come to the ED due to concerns of wound infection by nursing staff at NH. Patient reports intermittent pain at the wound sites that is controlled with Oxycodone. Patient states that wound care manages these sites at the nursing home. Patient is alert, oriented and responsive to questioning however he is a poor historian regarding his medical history. Per Chart Review the patient was admitted to the ED on 7/25 for AMS while on Vanc and Cefepime for Osteomyelitis. Cefepime neurotoxicity was suspected and therapy was switched to Vanc and Zosyn. He then developed  He was transferred to [facility] at that time and had a pacemaker placed. Patient reports currently being on oral antibiotic therapy but is unsure what he is taking. He reports taking his medications daily that the nurse at NH provides. He reports never smoking or using drugs and no longer drinks alcohol.      Patient reports chronic Sams cath usage for unknown reason that was last changed today. He reports an episode of  dysuria yesterday and endorses suprapubic pain on palpation. He denies hematuria, urinary frequency/urgency, urinary odor or abdominal pain.      He also reports a mild cough and chills for the past several weeks with scant sputum production but denies fever, chest pain, SOB or recent illness. He reports chronic diaphoresis at night that requires him to change clothes in the morning.      Overview/Hospital Course:   74 y.o. 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 Arellano, Chronic Multifactorial Anemia, BPH s/p Chronic Sams, paroxysmal A-fib on eliquis, CHB s/p PPM medtronic (10/12/2023), CKD3, Multifactorial HTN, HLD and Osteomyelitis of R foot presenting from [facility] with suspected infection of sacral wound. Started on Vanc and Zosyn given prior history of prior susceptibilities. General surgery plans for wound debridement 10/1. Wound care and podiatry following. X-ray of right foot c/f chronic osteomyelitis. MRI with acute osteomyelitis of the posterior 2/3 of the calcaneus and early osteomyelitis of the lateral malleolus and 1st distal phalanx. R heel bone biopsy and debridement done by podiatry on 10/3. R foot bone culture positive for staph caprae and pseudomonas, Proteus on sacral culture. Currently on daptomycin and zosyn.     Interval History: NAEON. Patient agreeable to having PICC line placed. No specific complaints.     Review of Systems   Constitutional:  Negative for chills and fever.   Respiratory:  Negative for shortness of breath.    Gastrointestinal:  Negative for abdominal pain, nausea and vomiting.   Genitourinary:  Negative for dysuria.   Musculoskeletal:  Positive for myalgias.   Skin:  Positive for wound.   Neurological:  Negative for dizziness and headaches.     Objective:     Vital Signs (Most Recent):  Temp: 98.8 °F (37.1 °C) (10/08/24 1605)  Pulse: 77 (10/08/24 1605)  Resp: 16  (10/08/24 1605)  BP: (!) 140/70 (10/08/24 1605)  SpO2: 100 % (10/08/24 1605) Vital Signs (24h Range):  Temp:  [97.4 °F (36.3 °C)-98.8 °F (37.1 °C)] 98.8 °F (37.1 °C)  Pulse:  [71-81] 77  Resp:  [12-18] 16  SpO2:  [95 %-100 %] 100 %  BP: (108-140)/(60-72) 140/70     Weight: 83.4 kg (183 lb 13.8 oz)  Body mass index is 27.15 kg/m².    Intake/Output Summary (Last 24 hours) at 10/8/2024 1819  Last data filed at 10/8/2024 1806  Gross per 24 hour   Intake 1264.48 ml   Output 625 ml   Net 639.48 ml         Physical Exam  Constitutional:       General: He is not in acute distress.     Appearance: He is not ill-appearing.   HENT:      Head: Normocephalic and atraumatic.   Eyes:      General: No scleral icterus.  Cardiovascular:      Rate and Rhythm: Normal rate. Rhythm irregular.      Heart sounds: No murmur heard.  Pulmonary:      Effort: Pulmonary effort is normal. No respiratory distress.      Breath sounds: Normal breath sounds.   Abdominal:      Palpations: Abdomen is soft.      Tenderness: There is no abdominal tenderness.   Musculoskeletal:      Cervical back: Normal range of motion.      Comments: S/p L AKA  Right lower extremity with cracked, dry skin as well as right heel wound. R heel dressing intact.   Skin:     General: Skin is dry.      Comments: Refer to images of wounds   Neurological:      General: No focal deficit present.      Mental Status: He is alert.             Significant Labs: All pertinent labs within the past 24 hours have been reviewed.    Significant Imaging: I have reviewed all pertinent imaging results/findings within the past 24 hours.    Assessment/Plan:      * Osteomyelitis  R heal OM diagnosed during admission 6/26-7/11 with bone cx positive for pseudomonas and MRSA. Discharged on vanc and cefepime for 6 week course.  Recent admission 8/2-8/6, at that time Cefepime discontinued due to c/f Cefepime neurotoxicity, then developed Vanc YNES, and discharged on Daptomycin and Zosyn. Started on  vanc on arrival to ED.     - extensive grade 3-4 sacral wounds, L hip wound purulence  - chronic osteomyelitis of R heel with nonhealing ulcer    - Wound Cx from L buttocks, positive for proteus susceptible zosyn  - Wound Cx from R foot Aerobic culture, positive for staph species and Pseudomonas  - Blood Cx NGTD  - MRI R foot ordered , c/f acute osteomyelitis of 2/3 calcaneus and early osteomyelitis of lateral malleolus & 1st distal phalanx  - MRI pelvis ordered, Bilateral osteomyelitis of the posterior iliac bones    Plan:  - Wound care consulted, recommended diverting ostomy for healing wounds  - Podiatry consulted, performed bone biopsy and debridement of the R heel on 10/3   - ID consulted, rec to continue Daptomycin & Zosyn ; awaiting final antibiotic recommendations  - continue oxycodone and tylenol PRN for pain    Thrombocytosis        Chronic indwelling Fung catheter  Pt has failed multiple void trials in the past, now with chronic fung. Most recently replaced 9/29. C/f UTI  UA shows +1 blood, 3+ leuk esterase, >100 WBCs and few bacteria, patient reports episode of dysuria yesterday and suprapubic pain on palpation     - Urine cultures NGTD          Anemia  Anemia is likely due to chronic disease due to Chronic Kidney Disease. Most recent hemoglobin and hematocrit are listed below.  Recent Labs     10/06/24  0522 10/07/24  0801 10/08/24  0538   HGB 7.3* 8.4* 8.6*   HCT 25.3* 30.5* 26.6*       Plan  - Monitor serial CBC: daily  - Transfuse PRBC if patient becomes hemodynamically unstable, symptomatic or H/H drops below 7/21.  - Patient has received 1 units of PRBCs on 9/29  & 1 unit pRBCs on 10/3  - Likely multifactorial with AOCD, as well as potentially GI bleed vs  AVM. Continue to monitor  - Elevated ferritin, Anemia 2/2 chronic disease    Sacral wound  Wound care recommended diverting ostomy to assist in wound healing. Consulted CRS, appreciate assistance. However, patient not interested in procedure at  this time.     Stage 3b chronic kidney disease  Creatine stable for now. BMP reviewed- noted Estimated Creatinine Clearance: 58 mL/min (based on SCr of 1.1 mg/dL). according to latest data. Based on current GFR, CKD stage is stage 3 - GFR 30-59.  Monitor UOP and serial BMP and adjust therapy as needed. Renally dose meds. Avoid nephrotoxic medications and procedures.    - hold nephrotoxic medications  - hold Lasix at this time    Paroxysmal atrial fibrillation  Patient with Paroxysmal (<7 days) atrial fibrillation which is uncontrolled. Patient is currently in atrial fibrillation.      echo on 7/2024 showed EF of 55-60% with no significant valvular abnormalities   - follows with Vascular Surgeon Dr. Banda  - hold home Lasix and HCTZ, not volume overloaded   - electrolytes stable, trend CMP  - hold home Eliquis due to unstable H&H, trend CBC  - monitor Afib, continuous telemetry   - Restarted home Eliquis    Wound of left leg  See osteomyelitis    Type 2 diabetes mellitus, with long-term current use of insulin   Hb A1c on 7/25 was 6. Home Lantus 17 u BID  - Lantus 12 units BID, increased from 8 units BID due to elevated glucose  - continue LDSSI   - POCT glucose checks before meals and nightly      Essential hypertension  Chronic, uncontrolled. Latest blood pressure and vitals reviewed    While in the hospital, will manage blood pressure as follows; Adjust home antihypertensive regimen as follows- held home Nifedipine    Will utilize p.r.n. blood pressure medication only if patient's blood pressure greater than 180/110 and he develops symptoms such as worsening chest pain or shortness of breath.    - Hold diuretics, no signs of volume overload       VTE Risk Mitigation (From admission, onward)           Ordered     apixaban tablet 5 mg  2 times daily         10/05/24 6298     Reason for No Pharmacological VTE Prophylaxis  Once        Question:  Reasons:  Answer:  Risk of Bleeding  Comment:  c/f bleeding with drop  in Hgb    09/29/24 1914     IP VTE HIGH RISK PATIENT  Once         09/29/24 1914     Place sequential compression device  Until discontinued         09/29/24 1914                    Discharge Planning   OXANA: 10/9/2024     Code Status: Full Code   Is the patient medically ready for discharge?:     Reason for patient still in hospital (select all that apply): Treatment and Consult recommendations  Discharge Plan A: Skilled Nursing Facility   Discharge Delays: None known at this time              Colten Rodriguez MD  Department of Hospital Medicine   [facility] ([place]-14)

## 2024-10-08 NOTE — NURSING
Pt AAO x 3, no c/o pain. VSS, Afib on telemetry. PICC placed to R upper arm today by the picc team. Placement confirmed by xray. Nursing communication instructing okay to use picc. Wound care nurse assisted with wound care today. All dressings changed. BM x 2, Pt placed on an immerse bed. Position changed frequently. Assisted with all meal set ups. No acute events this shift. Contact precautions maintained. Bed low and locked, call light placed in reach.

## 2024-10-08 NOTE — ASSESSMENT & PLAN NOTE
Patient with Paroxysmal (<7 days) atrial fibrillation which is uncontrolled. Patient is currently in atrial fibrillation.      echo on 7/2024 showed EF of 55-60% with no significant valvular abnormalities   - follows with Vascular Surgeon Dr. Banda  - hold home Lasix and HCTZ, not volume overloaded   - electrolytes stable, trend CMP  - hold home Eliquis due to unstable H&H, trend CBC  - monitor Afib, continuous telemetry   - Restarted home Eliquis

## 2024-10-09 ENCOUNTER — HOSPITAL ENCOUNTER (OUTPATIENT)
Facility: HOSPITAL | Age: 74
LOS: 1 days | Discharge: NURSING, REHAB OR LONG-TERM CARE | End: 2024-10-10
Attending: EMERGENCY MEDICINE | Admitting: INTERNAL MEDICINE
Payer: MEDICARE

## 2024-10-09 VITALS
BODY MASS INDEX: 27.24 KG/M2 | SYSTOLIC BLOOD PRESSURE: 143 MMHG | RESPIRATION RATE: 16 BRPM | HEART RATE: 91 BPM | OXYGEN SATURATION: 99 % | HEIGHT: 69 IN | TEMPERATURE: 99 F | DIASTOLIC BLOOD PRESSURE: 82 MMHG | WEIGHT: 183.88 LBS

## 2024-10-09 DIAGNOSIS — Z95.828 STATUS POST PICC CENTRAL LINE PLACEMENT: Primary | ICD-10-CM

## 2024-10-09 LAB
ALBUMIN SERPL BCP-MCNC: 1.4 G/DL (ref 3.5–5.2)
ALP SERPL-CCNC: 100 U/L (ref 55–135)
ALT SERPL W/O P-5'-P-CCNC: 16 U/L (ref 10–44)
ANION GAP SERPL CALC-SCNC: 8 MMOL/L (ref 8–16)
AST SERPL-CCNC: 28 U/L (ref 10–40)
BASOPHILS # BLD AUTO: 0.05 K/UL (ref 0–0.2)
BASOPHILS NFR BLD: 0.5 % (ref 0–1.9)
BILIRUB SERPL-MCNC: 0.1 MG/DL (ref 0.1–1)
BUN SERPL-MCNC: 15 MG/DL (ref 8–23)
CALCIUM SERPL-MCNC: 8.8 MG/DL (ref 8.7–10.5)
CHLORIDE SERPL-SCNC: 108 MMOL/L (ref 95–110)
CK SERPL-CCNC: 190 U/L (ref 20–200)
CO2 SERPL-SCNC: 24 MMOL/L (ref 23–29)
CREAT SERPL-MCNC: 0.8 MG/DL (ref 0.5–1.4)
CRP SERPL-MCNC: 85.9 MG/L (ref 0–8.2)
DIFFERENTIAL METHOD BLD: ABNORMAL
EOSINOPHIL # BLD AUTO: 0.5 K/UL (ref 0–0.5)
EOSINOPHIL NFR BLD: 5.3 % (ref 0–8)
ERYTHROCYTE [DISTWIDTH] IN BLOOD BY AUTOMATED COUNT: 17.5 % (ref 11.5–14.5)
EST. GFR  (NO RACE VARIABLE): >60 ML/MIN/1.73 M^2
GLUCOSE SERPL-MCNC: 70 MG/DL (ref 70–110)
HCT VFR BLD AUTO: 26.2 % (ref 40–54)
HGB BLD-MCNC: 7.6 G/DL (ref 14–18)
IMM GRANULOCYTES # BLD AUTO: 0.05 K/UL (ref 0–0.04)
IMM GRANULOCYTES NFR BLD AUTO: 0.5 % (ref 0–0.5)
LYMPHOCYTES # BLD AUTO: 1.1 K/UL (ref 1–4.8)
LYMPHOCYTES NFR BLD: 10.6 % (ref 18–48)
MAGNESIUM SERPL-MCNC: 1.7 MG/DL (ref 1.6–2.6)
MCH RBC QN AUTO: 25.6 PG (ref 27–31)
MCHC RBC AUTO-ENTMCNC: 29 G/DL (ref 32–36)
MCV RBC AUTO: 88 FL (ref 82–98)
MONOCYTES # BLD AUTO: 0.5 K/UL (ref 0.3–1)
MONOCYTES NFR BLD: 5.4 % (ref 4–15)
NEUTROPHILS # BLD AUTO: 7.7 K/UL (ref 1.8–7.7)
NEUTROPHILS NFR BLD: 77.7 % (ref 38–73)
NRBC BLD-RTO: 0 /100 WBC
PHOSPHATE SERPL-MCNC: 3.6 MG/DL (ref 2.7–4.5)
PLATELET # BLD AUTO: 488 K/UL (ref 150–450)
PMV BLD AUTO: 9.2 FL (ref 9.2–12.9)
POCT GLUCOSE: 101 MG/DL (ref 70–110)
POCT GLUCOSE: 88 MG/DL (ref 70–110)
POTASSIUM SERPL-SCNC: 3.4 MMOL/L (ref 3.5–5.1)
PROT SERPL-MCNC: 6.4 G/DL (ref 6–8.4)
RBC # BLD AUTO: 2.97 M/UL (ref 4.6–6.2)
SODIUM SERPL-SCNC: 140 MMOL/L (ref 136–145)
WBC # BLD AUTO: 9.88 K/UL (ref 3.9–12.7)

## 2024-10-09 PROCEDURE — 63600175 PHARM REV CODE 636 W HCPCS: Mod: HCNC

## 2024-10-09 PROCEDURE — 82550 ASSAY OF CK (CPK): CPT | Mod: HCNC

## 2024-10-09 PROCEDURE — 25000003 PHARM REV CODE 250: Mod: HCNC

## 2024-10-09 PROCEDURE — 99284 EMERGENCY DEPT VISIT MOD MDM: CPT | Mod: HCNC

## 2024-10-09 PROCEDURE — 25000003 PHARM REV CODE 250: Mod: HCNC | Performed by: HOSPITALIST

## 2024-10-09 PROCEDURE — 85025 COMPLETE CBC W/AUTO DIFF WBC: CPT | Mod: HCNC

## 2024-10-09 PROCEDURE — 84100 ASSAY OF PHOSPHORUS: CPT | Mod: HCNC

## 2024-10-09 PROCEDURE — 83735 ASSAY OF MAGNESIUM: CPT | Mod: HCNC

## 2024-10-09 PROCEDURE — 99285 EMERGENCY DEPT VISIT HI MDM: CPT | Mod: HCNC

## 2024-10-09 PROCEDURE — A4216 STERILE WATER/SALINE, 10 ML: HCPCS | Mod: HCNC | Performed by: HOSPITALIST

## 2024-10-09 PROCEDURE — 86140 C-REACTIVE PROTEIN: CPT | Mod: HCNC

## 2024-10-09 PROCEDURE — 80053 COMPREHEN METABOLIC PANEL: CPT | Mod: HCNC

## 2024-10-09 RX ORDER — INSULIN GLARGINE 100 [IU]/ML
10 INJECTION, SOLUTION SUBCUTANEOUS 2 TIMES DAILY
Status: DISCONTINUED | OUTPATIENT
Start: 2024-10-09 | End: 2024-10-09 | Stop reason: HOSPADM

## 2024-10-09 RX ORDER — OXYCODONE HYDROCHLORIDE 5 MG/1
5 TABLET ORAL EVERY 6 HOURS PRN
Qty: 28 TABLET | Refills: 0 | Status: SHIPPED | OUTPATIENT
Start: 2024-10-09 | End: 2024-10-09

## 2024-10-09 RX ORDER — POTASSIUM CHLORIDE 20 MEQ/1
40 TABLET, EXTENDED RELEASE ORAL ONCE
Status: COMPLETED | OUTPATIENT
Start: 2024-10-09 | End: 2024-10-09

## 2024-10-09 RX ORDER — INSULIN ASPART 100 [IU]/ML
0-5 INJECTION, SOLUTION INTRAVENOUS; SUBCUTANEOUS
Start: 2024-10-09

## 2024-10-09 RX ORDER — OXYCODONE HYDROCHLORIDE 5 MG/1
5 TABLET ORAL EVERY 6 HOURS PRN
Qty: 28 TABLET | Refills: 0 | Status: SHIPPED | OUTPATIENT
Start: 2024-10-09 | End: 2024-10-16

## 2024-10-09 RX ORDER — POLYETHYLENE GLYCOL 3350 17 G/17G
17 POWDER, FOR SOLUTION ORAL DAILY PRN
Start: 2024-10-09

## 2024-10-09 RX ORDER — INSULIN GLARGINE 100 [IU]/ML
10 INJECTION, SOLUTION SUBCUTANEOUS 2 TIMES DAILY
Start: 2024-10-09

## 2024-10-09 RX ORDER — POTASSIUM CHLORIDE 20 MEQ/1
40 TABLET, EXTENDED RELEASE ORAL
Status: DISCONTINUED | OUTPATIENT
Start: 2024-10-09 | End: 2024-10-09

## 2024-10-09 RX ORDER — MAGNESIUM SULFATE HEPTAHYDRATE 40 MG/ML
2 INJECTION, SOLUTION INTRAVENOUS ONCE
Status: COMPLETED | OUTPATIENT
Start: 2024-10-09 | End: 2024-10-09

## 2024-10-09 RX ORDER — ERGOCALCIFEROL 1.25 MG/1
50000 CAPSULE ORAL
Start: 2024-10-11

## 2024-10-09 RX ADMIN — OXYCODONE HYDROCHLORIDE 10 MG: 5 TABLET ORAL at 07:10

## 2024-10-09 RX ADMIN — MAGNESIUM SULFATE HEPTAHYDRATE 2 G: 40 INJECTION, SOLUTION INTRAVENOUS at 10:10

## 2024-10-09 RX ADMIN — TAMSULOSIN HYDROCHLORIDE 0.4 MG: 0.4 CAPSULE ORAL at 09:10

## 2024-10-09 RX ADMIN — ZINC SULFATE 220 MG (50 MG) CAPSULE 220 MG: CAPSULE at 09:10

## 2024-10-09 RX ADMIN — PIPERACILLIN SODIUM AND TAZOBACTAM SODIUM 4.5 G: 4; .5 INJECTION, POWDER, FOR SOLUTION INTRAVENOUS at 05:10

## 2024-10-09 RX ADMIN — Medication 10 ML: at 01:10

## 2024-10-09 RX ADMIN — GABAPENTIN 300 MG: 300 CAPSULE ORAL at 09:10

## 2024-10-09 RX ADMIN — APIXABAN 5 MG: 5 TABLET, FILM COATED ORAL at 09:10

## 2024-10-09 RX ADMIN — ACETAMINOPHEN 650 MG: 325 TABLET ORAL at 01:10

## 2024-10-09 RX ADMIN — POTASSIUM CHLORIDE 40 MEQ: 1500 TABLET, EXTENDED RELEASE ORAL at 10:10

## 2024-10-09 RX ADMIN — ACETAMINOPHEN 650 MG: 325 TABLET ORAL at 05:10

## 2024-10-09 RX ADMIN — POTASSIUM CHLORIDE 40 MEQ: 1500 TABLET, EXTENDED RELEASE ORAL at 09:10

## 2024-10-09 RX ADMIN — THERA TABS 1 TABLET: TAB at 09:10

## 2024-10-09 RX ADMIN — FUROSEMIDE 20 MG: 20 TABLET ORAL at 09:10

## 2024-10-09 RX ADMIN — OXYCODONE HYDROCHLORIDE AND ACETAMINOPHEN 500 MG: 500 TABLET ORAL at 09:10

## 2024-10-09 NOTE — NURSING
Patient is discharging back to [facility]. Ivs removed, tele removed and patient transferred to [facility].

## 2024-10-09 NOTE — MEDICAL/APP STUDENT
[facility] - Stepdown Flex ([facility]-)  History & Physical    Subjective:      Chief Complaint/Reason for Admission: Osteomyelitis     Saji Castañeda is a 74 y.o. male with a PMHx of T2DM on insulin (previously poorly controlled, a1c 6.0 7/2024), HTN, HLD, CDKIIIa, pAF on eliquis, PVD s/p L AKA, BPH s/p chronic fung, CHB s/p PPM, extensive pressure wounds (documented of sacral spine, R ischial tuberosity, L ischial tuberosity, R greater trochanter, R distal lower leg, R toe, and R heel) admitted with concerns for L hip wound purulent drainage and R heal wound. Imaging further revealed b/l osteomyelitis of posterior iliac bones, as well as acute osteomyelitis of the heal, lateral malleolus, and 1st distal phalanx on R. Patient underwent debridement of L hip and sacrum on 10/1. Podiatry has followed for R foot wounds. LE US arterial shows no stenosis. Patient underwent R heel bone biopsy and debridement 10/3. Cultures show S. Caprae and P. Aeruginosa from R foot bone biopsy. . L hip wound cultures postive for Proteus Mirabilis. ID consulted, recommends continuing IV Zosyn and Daptomycin for 4 more weeks, last dose on 11/10/24. Resumed home Eliquis. PICC line placed yesterday.   Given non-healing wounds we did call CRS to eval for diverting colostomy. After patient discussion, this was felt to be too high-risk at this time.   Plan to discharge to NH on 10/9.     Past Medical History:   Diagnosis Date    Arthritis     legs    Bacteremia due to Gram-negative bacteria 3/23/2021    Diabetes mellitus     Diabetes mellitus, type 2     Hyperlipidemia     Hypertension     Osteomyelitis     Palliative care encounter 5/24/2023     Past Surgical History:   Procedure Laterality Date    ABOVE-KNEE AMPUTATION Left 3/27/2024    Procedure: AMPUTATION, ABOVE KNEE;  Surgeon: Adal Arellano MD;  Location: [facility] OR [address] [place] [address];  Service: Vascular;  Laterality: Left;    ANGIOGRAPHY OF LOWER EXTREMITY N/A 2/3/2021    Procedure:  "Angiogram Extremity Bilateral;  Surgeon: Ernst Chacko MD;  Location: [facility] OR 2ND FLR;  Service: Peripheral Vascular;  Laterality: N/A;  7.4 mintues fluoroscopy time  816.15 mGy  170.17 Gycm2    AORTOGRAPHY WITH EXTREMITY RUNOFF Bilateral 2/3/2021    Procedure: AORTOGRAM, WITH EXTREMITY RUNOFF;  Surgeon: Ernst Chacko MD;  Location: [facility] OR 2ND FLR;  Service: Peripheral Vascular;  Laterality: Bilateral;    DEBRIDEMENT OF FOOT Left 2/23/2021    Procedure: DEBRIDEMENT, LEFT HEEL;  Surgeon: Mayra Schroeder DPM;  Location: [facility] OR 1ST FLR;  Service: Podiatry;  Laterality: Left;    ESOPHAGOGASTRODUODENOSCOPY N/A 6/28/2024    Procedure: EGD (ESOPHAGOGASTRODUODENOSCOPY);  Surgeon: Adal Chandra MD;  Location: [facility] ENDO;  Service: Gastroenterology;  Laterality: N/A;    FOOT AMPUTATION  October 2010    left high midfoot amputation    IMPLANTATION OF LEADLESS PACEMAKER N/A 10/12/2023    Procedure: [id number];  Surgeon: VANESSA Delatorre MD;  Location: [facility] EP LAB;  Service: Cardiology;  Laterality: N/A;  AVB, MICRA, EH, [place]ES, [place] [address]     Social History     Tobacco Use    Smoking status: Never    Smokeless tobacco: Never   Substance Use Topics    Alcohol use: No     Comment: occassional    Drug use: No       PTA Medications   Medication Sig    acetaminophen (TYLENOL) 325 MG tablet Take 650 mg by mouth every 6 (six) hours as needed for Pain.    amoxicillin-clavulanate 875-125mg (AUGMENTIN) 875-125 mg per tablet Take 1 tablet by mouth 2 (two) times daily.    apixaban (ELIQUIS) 5 mg Tab Take 1 tablet (5 mg total) by mouth 2 (two) times daily.    ascorbic acid, vitamin C, (VITAMIN C) 500 MG tablet Take 500 mg by mouth 2 (two) times daily.    BD ULTRA-FINE ADITYA PEN NEEDLE 32 gauge x 5/32" Ndle USE FOUR TIMES DAILY WITH MEALS AND NIGHTLY    blood sugar diagnostic (CONTOUR TEST STRIPS) Strp 1 strip by Misc.(Non-Drug; Combo Route) route 4 (four) times daily. Use to check blood " glucose 4x daily    diphenhydrAMINE (BENADRYL) 25 mg capsule Take 1 capsule (25 mg total) by mouth every 6 (six) hours as needed for Itching.    [] ergocalciferol (ERGOCALCIFEROL) 50,000 unit Cap Take 1 capsule (50,000 Units total) by mouth Every Friday. for 10 doses    ferrous sulfate 325 (65 FE) MG EC tablet Take 325 mg by mouth 2 (two) times daily.    furosemide (LASIX) 20 MG tablet Take 1 tablet (20 mg total) by mouth once daily.    gabapentin (NEURONTIN) 300 MG capsule Take 300 mg by mouth 2 (two) times daily.    insulin aspart U-100 (NOVOLOG) 100 unit/mL (3 mL) InPn pen Inject 5 Units into the skin 3 (three) times daily with meals.    insulin aspart U-100 (NOVOLOG) 100 unit/mL injection Inject 2-10 Units into the skin 3 (three) times daily before meals. 0-149=0  150-200: 2 units  201-250: 4 units  251-300: 6 units  301-350: 8 units  351-1000=10 units, NOTIFY MD    lancets (MICROLET LANCET) Misc 1 each by Misc.(Non-Drug; Combo Route) route 4 (four) times daily. Use to check blood sugars 4x daily    LANTUS SOLOSTAR U-100 INSULIN 100 unit/mL (3 mL) InPn pen Inject 17 Units into the skin 2 (two) times a day.    menthol-zinc oxide (CALMOSEPTINE) 0.44-20.6 % Oint Apply topically as needed.    multivitamin (THERAGRAN) per tablet Take 1 tablet by mouth once daily.    NIFEdipine (PROCARDIA-XL) 90 MG (OSM) 24 hr tablet Take 1 tablet (90 mg total) by mouth once daily.    nutritional supplements Powd Take 1 packet by mouth 2 (two) times a day.    oxyCODONE (ROXICODONE) 5 MG immediate release tablet Take 1 tablet (5 mg total) by mouth every 24 hours as needed for Pain. (Patient taking differently: Take 5 mg by mouth every 6 (six) hours as needed for Pain.)    pantoprazole (PROTONIX) 40 MG tablet Take 40 mg by mouth once daily. Before breakfast    potassium chloride SA (K-DUR,KLOR-CON) 20 MEQ tablet Take 20 mEq by mouth 2 (two) times daily.    sodium hypochlorite (DAKINS) external solution Apply to affected area  topically one time per day    tamsulosin (FLOMAX) 0.4 mg Cap Take 0.4 mg by mouth once daily.    TRUE METRIX GLUCOSE METER Misc     zinc sulfate (ZINCATE) 50 mg zinc (220 mg) capsule Take 220 mg by mouth every morning.     Review of patient's allergies indicates:  No Known Allergies     Review of Systems   Constitutional:  Negative for chills, diaphoresis, fever and malaise/fatigue.   HENT:  Negative for congestion, hearing loss, nosebleeds, sinus pain and sore throat.    Eyes:  Negative for blurred vision, photophobia, discharge and redness.   Respiratory:  Negative for cough, hemoptysis, sputum production, shortness of breath and stridor.    Cardiovascular:  Negative for chest pain, palpitations, orthopnea and leg swelling.   Gastrointestinal:  Negative for abdominal pain, heartburn, nausea and vomiting.   Genitourinary:  Negative for dysuria, frequency, hematuria and urgency.   Musculoskeletal:  Negative for back pain, myalgias and neck pain.   Skin:  Negative for itching and rash.   Neurological:  Negative for dizziness, tingling, tremors and headaches.   Endo/Heme/Allergies:  Negative for environmental allergies and polydipsia.   Psychiatric/Behavioral:  Negative for hallucinations. The patient does not have insomnia.        Objective:      Vital Signs (Most Recent)  Temp: 98 °F (36.7 °C) (10/09/24 0400)  Pulse: 75 (10/09/24 0400)  Resp: 18 (10/09/24 0701)  BP: 138/70 (10/09/24 0400)  SpO2: 99 % (10/09/24 0400)    Vital Signs Range (Last 24H):  Temp:  [97.9 °F (36.6 °C)-98.8 °F (37.1 °C)]   Pulse:  [68-81]   Resp:  [15-18]   BP: (130-142)/(64-77)   SpO2:  [95 %-100 %]     Physical Exam  Constitutional:       General: He is not in acute distress.     Appearance: Normal appearance. He is normal weight. He is not ill-appearing or toxic-appearing.   HENT:      Head: Normocephalic and atraumatic.      Nose: No congestion or rhinorrhea.   Eyes:      General: No scleral icterus.     Pupils: Pupils are equal, round,  and reactive to light.   Cardiovascular:      Rate and Rhythm: Normal rate and regular rhythm.      Pulses: Normal pulses.      Heart sounds: Normal heart sounds. No murmur heard.     No friction rub. No gallop.   Pulmonary:      Effort: Pulmonary effort is normal. No respiratory distress.      Breath sounds: Normal breath sounds. No stridor. No wheezing, rhonchi or rales.   Abdominal:      General: Abdomen is flat. There is no distension.      Palpations: Abdomen is soft.      Tenderness: There is no abdominal tenderness. There is no guarding.   Musculoskeletal:         General: No swelling. Normal range of motion.      Cervical back: Normal range of motion. No rigidity or tenderness.      Right lower leg: No edema.      Left lower leg: No edema.   Lymphadenopathy:      Cervical: No cervical adenopathy.   Skin:     General: Skin is warm and dry.      Capillary Refill: Capillary refill takes less than 2 seconds.      Findings: Lesion present.      Comments: Sacral wounds, R heel wound   Neurological:      General: No focal deficit present.      Mental Status: He is alert and oriented to person, place, and time. Mental status is at baseline.      Cranial Nerves: No cranial nerve deficit.      Sensory: Sensory deficit present.   Psychiatric:         Mood and Affect: Mood normal.         Behavior: Behavior normal.         Thought Content: Thought content normal.         Judgment: Judgment normal.           Assessment:      Active Hospital Problems    Diagnosis  POA    *Osteomyelitis [M86.9]  Yes    Thrombocytosis [D75.839]  Yes    Anemia [D64.9]  Yes    Chronic indwelling Sams catheter [Z97.8]  Not Applicable    Sacral wound [S31.000A]  Yes    Stage 3b chronic kidney disease [N18.32]  Yes    Paroxysmal atrial fibrillation [I48.0]  Yes     Chronic    Wound of left leg [S81.802A]  Yes    Type 2 diabetes mellitus, with long-term current use of insulin [E11.9, Z79.4]  Not Applicable    Essential hypertension [I10]  Yes      Chronic      Resolved Hospital Problems   No resolved problems to display.       Plan:    Chronic Osteomyelitis of R heel  L hip wound infection, Proteus  MRSA history  History of Carbapenem resistant Pseudomonas   - Recent history of IV Vanc and Zoysn 7/25  - extensive grade 3-4 sacral wounds, L hip wound purulence  - blood cultures no growth x5 days  - CXR shows coarse interstitial attenuation, similar to prior  - WBC elevated 23.2 9/30, trending down, 9.37  - hip wound cultures reveal proteus mirabilis, sensitive to Zoysn  - continue oxycodone and tylenol PRN for pain  - Gen Surgery signing off, performed drainage of L hip abscess and debridement of sacral wound 10/1, rec no further intervention  - Podiatry following, MRI revealed acute osteomyelitis of the posterior 2/3 of the calcaneous and early osteo of the lateral malleolus and 1st distal phalanx, recommending medial tx, no planned intervention  - MRI pelvis revealed b/l osteomyelitis of the posterior iliac bones.  - R foot cultures show pan sensitive pseudomonas and Staphylococcus Caprae  - PICC line placed 10/8  - Id consulted, recommends to continue IV Zoysn and Daptomycin for 6 weeks total, last dose 11/10/24   - plan to d/c to NH w/ IV antibiotics 10/9       Paroxysmal Atrial Fibrillation on Eliquis   Pacemaker   PVD  CHF  - echo on 7/2024 showed EF of 55-60% with no significant valvular abnormalities   - follows with Vascular Surgeon Dr. Banda  - continue home Lasix and hold HCTZ  - electrolytes stable, trend CMP  - continue cardiac monitoring   - resumed home Eliquis, stable H&H      Anemia of Chronic Disease   Acute Anemia   - H&H 7.6 and 26.2  - received 1 unit of PRBCs 10/2 and 1 unit 10/3  - transfused 1 unit of PRBCs in ED  - hold iron supplement   - Given non-healing wounds we did call CRS to eval for diverting colostomy. After patient discussion, this was felt to be too high-risk at this time.      Below Knee Amputation 3/24  - due to  nonhealing L foot wound   - well-healed scar   - patient is non-ambulatory      Chronic Sams  Suspected Urinary Tract Infection  - UA shows +1 blood, 3+ leuk esterase, >100 WBCs and few bacteria, patient reports episode of dysuria yesterday and suprapubic pain on palpation   - culture shows no growth x5 days, completed        Type 2 Diabetes Mellitus on Insulin   - Hb A1c on 7/25 was 6  - continue insulin sliding scale      CKD3  - hold nephrotoxic medications  - continue Lasix at this time, hold HCTZ  - renally dose Vanc     HTN  - continuing home Lasix      Hypoalbuminemia   - albumin 1.4     DVT Ppx: continue home Eliquis   Disposition: awaiting sensitivities and ID recs for antibiotics

## 2024-10-09 NOTE — NURSING
Attempted to call report to [facility] and nurse was on lunch. Left message with  and she stated she will call back .

## 2024-10-09 NOTE — PLAN OF CARE
Discharge Plan A and Plan B have been determined by review of patient's clinical status, future medical and therapeutic needs, and coverage/benefits for post-acute care in coordination with multidisciplinary team members.      10/09/24 0924   Post-Acute Status   Post-Acute Authorization Placement;Other   Post-Acute Placement Status Set-up Complete/Auth obtained   Other Status See Comments  (Ambulatory Wound care Clinic)   Hospital Resources/Appts/Education Provided Appointments scheduled and added to AVS   Patient choice form signed by patient/caregiver List from CMS Compare;List with quality metrics by geographic area provided   Discharge Delays None known at this time   Discharge Plan   Discharge Plan A Skilled Nursing Facility  ([facility] Phone [phone])     CM met with patient and spoke with sister January MEDINA # [phone]  via phone  to discuss discharge planning.  Patients plan is to return to [facility] for skilled services for wound care and IV abx therapy along with referral to an ambulatory wound care referral for wound management.      OXANA:  10/09/24       0934 am  CM sent a message to Bryce ALLISON at [facility] that patient is discharging today and will upload SNF order once received    1055 am  CM uploaded SNF orders and ambulatory wound care referral via [facility]. CM reached out to attending re: hard script for oxyCODONE 5 MG immediate release tablet         Follow up: facility physician      Referrals: Ambulatory wound care    1217 pm  Patient is assigned to room 8 B and assigned nurse is to call report to [phone] and transportation scheduled for 1:30 pm, ETA pending. Hard script uploaded via Care  Port      ETA  3:30 pm    Keerthi Alex RN  Case Management  [facility]  [phone]

## 2024-10-09 NOTE — DISCHARGE SUMMARY
[facility] - Stepdown Flex ([place] [address])  [facility] Medicine  Discharge Summary      Patient Name: Saji Castañeda  MRN: [id number]  [facility]: [id number]  Patient Class: IP- Inpatient  Admission Date: 9/29/2024  Hospital Length of Stay: 9 days  Discharge Date and Time:  10/09/2024 10:18 AM  Attending Physician: Seymour Betancourt MD   Discharging Provider: Colten Rodriguez MD  Primary Care Provider: Vinod Elise MD  [facility] Medicine Team: [facility] Colten Rodriguez MD  Primary Care Team: [facility]    HPI:   Saji Castañeda is a 74 y.o. 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 Arellano, Chronic Multifactorial Anemia, BPH s/p Chronic Sams, paroxysmal A-fib on eliquis, CHB s/p PPM medtronic (10/12/2023), CKD3, Multifactorial HTN, HLD and Osteomyelitis of R foot presenting from [facility] with suspected infection of L hip wound. He is nonambulatory . Patient has chronic, extensive sacral wounds, a R heel wound and a wound on the L hip with purulence and a foul odor. The patient was recommended to come to the ED due to concerns of wound infection by nursing staff at NH. Patient reports intermittent pain at the wound sites that is controlled with Oxycodone. Patient states that wound care manages these sites at the nursing home. Patient is alert, oriented and responsive to questioning however he is a poor historian regarding his medical history. Per Chart Review the patient was admitted to the ED on 7/25 for AMS while on Vanc and Cefepime for Osteomyelitis. Cefepime neurotoxicity was suspected and therapy was switched to Vanc and Zosyn. He then developed  He was transferred to [facility] at that time and had a pacemaker placed. Patient reports currently being on oral antibiotic therapy but is unsure what he is taking. He reports taking his medications daily that the nurse at NH provides. He reports never smoking or using drugs and  no longer drinks alcohol.      Patient reports chronic Sams cath usage for unknown reason that was last changed today. He reports an episode of dysuria yesterday and endorses suprapubic pain on palpation. He denies hematuria, urinary frequency/urgency, urinary odor or abdominal pain.      He also reports a mild cough and chills for the past several weeks with scant sputum production but denies fever, chest pain, SOB or recent illness. He reports chronic diaphoresis at night that requires him to change clothes in the morning.      * No surgery found *      Hospital Course:   74M admitted with suspected infection of sacral wound. Started on Vanc and Zosyn given prior history of prior susceptibilities. General surgery debrided his wound on 10/1. Wound care and podiatry following. X-ray of right foot c/f chronic osteomyelitis. MRI with acute osteomyelitis of the posterior 2/3 of the calcaneus and early osteomyelitis of the lateral malleolus and 1st distal phalanx. R heel bone biopsy and debridement done by podiatry on 10/3. R foot bone culture positive for staph caprae and pseudomonas, Proteus on sacral culture. Currently on daptomycin and zosyn. PICC line placed. Planning for discharge on IV abx.      Goals of Care Treatment Preferences:  Code Status: Full Code    Health care agent: [facility] agent number: [phone]       LaPOST: Yes           SDOH Screening:  The patient was screened for utility difficulties, food insecurity, transport difficulties, housing insecurity, and interpersonal safety and there were no concerns identified this admission.     Consults:   Consults (From admission, onward)          Status Ordering Provider     Inpatient consult to Colorectal Surgery  Once        Provider:  (Not yet assigned)    Completed DAVID CHAN     Inpatient consult to PICC team (NIAS)  Once        Provider:  (Not yet assigned)    Completed ALEXANDRA BENITO     Inpatient consult to Midline team  Once         Provider:  (Not yet assigned)    Completed TYLOR ADLER     Inpatient consult to Infectious Diseases  Once        Provider:  (Not yet assigned)    Completed JEANNA CROSS     Inpatient consult to Podiatry  Once        Provider:  (Not yet assigned)    Completed JEANNA CROSS     Inpatient consult to General Surgery  Once        Provider:  (Not yet assigned)    Completed SACHA WU            No new Assessment & Plan notes have been filed under this hospital service since the last note was generated.  Service: Hospital Medicine    Final Active Diagnoses:    Diagnosis Date Noted POA    PRINCIPAL PROBLEM:  Osteomyelitis [M86.9] 03/10/2023 Yes    Thrombocytosis [D75.839] 09/30/2024 Yes    Anemia [D64.9] 09/29/2024 Yes    Chronic indwelling Sams catheter [Z97.8] 09/29/2024 Not Applicable    Sacral wound [S31.000A] 05/10/2024 Yes    Stage 3b chronic kidney disease [N18.32] 03/05/2024 Yes    Paroxysmal atrial fibrillation [I48.0] 10/02/2023 Yes     Chronic    Wound of left leg [S81.802A]  Yes    Type 2 diabetes mellitus, with long-term current use of insulin [E11.9, Z79.4] 09/23/2014 Not Applicable    Essential hypertension [I10] 08/05/2014 Yes     Chronic      Problems Resolved During this Admission:       Discharged Condition: stable    Disposition:     Follow Up:   Follow-up Information       [facility] Follow up.    Contact information:  [address] [place] [address] [phone]                         Patient Instructions:      Ambulatory referral/consult to Wound Clinic   Standing Status: Future   Referral Priority: Routine Referral Type: Consultation   Referral Reason: Specialty Services Required   Requested Specialty: Wound Care   Number of Visits Requested: 1       Pending Diagnostic Studies:       None           Medications:  Reconciled Home Medications:      Medication List        START taking these medications      0.9% NaCl SolP 50 mL with DAPTOmycin 500 mg  "SolR 667 mg  Inject 667 mg into the vein once daily. End of therapy: 11/10/24     D5W PgBk 100 mL with piperacillin-tazobactam 4.5 gram SolR 4.5 g  Inject 4.5 g into the vein every 8 (eight) hours. End of therapy: 11/10/24     polyethylene glycol 17 gram Pwpk  Commonly known as: GLYCOLAX  Take 17 g by mouth daily as needed for Constipation.            CHANGE how you take these medications      insulin aspart U-100 100 unit/mL injection  Commonly known as: NovoLOG  Inject 0-5 Units into the skin 3 (three) times daily before meals. 0-149=0  150-200: 0 units  201-250: 2 units  251-300: 3 units  301-350: 4 units  351-1000=5 units, NOTIFY MD  What changed:   how much to take  additional instructions  Another medication with the same name was removed. Continue taking this medication, and follow the directions you see here.     LANTUS SOLOSTAR U-100 INSULIN 100 unit/mL (3 mL) Inpn pen  Generic drug: insulin glargine U-100 (Lantus)  Inject 10 Units into the skin 2 (two) times a day.  What changed: how much to take            CONTINUE taking these medications      acetaminophen 325 MG tablet  Commonly known as: TYLENOL  Take 650 mg by mouth every 6 (six) hours as needed for Pain.     apixaban 5 mg Tab  Commonly known as: ELIQUIS  Take 1 tablet (5 mg total) by mouth 2 (two) times daily.     ascorbic acid (vitamin C) 500 MG tablet  Commonly known as: VITAMIN C  Take 500 mg by mouth 2 (two) times daily.     BD ULTRA-FINE ADITYA PEN NEEDLE 32 gauge x 5/32" Ndle  Generic drug: pen needle, diabetic  USE FOUR TIMES DAILY WITH MEALS AND NIGHTLY     blood sugar diagnostic Strp  Commonly known as: CONTOUR TEST STRIPS  1 strip by Misc.(Non-Drug; Combo Route) route 4 (four) times daily. Use to check blood glucose 4x daily     ergocalciferol 50,000 unit Cap  Commonly known as: ERGOCALCIFEROL  Take 1 capsule (50,000 Units total) by mouth Every Friday.  Start taking on: October 11, 2024     ferrous sulfate 325 (65 FE) MG EC tablet  Take 325 " mg by mouth 2 (two) times daily.     FLOMAX 0.4 mg Cap  Generic drug: tamsulosin  Take 0.4 mg by mouth once daily.     furosemide 20 MG tablet  Commonly known as: LASIX  Take 1 tablet (20 mg total) by mouth once daily.     gabapentin 300 MG capsule  Commonly known as: NEURONTIN  Take 300 mg by mouth 2 (two) times daily.     lancets Misc  Commonly known as: MICROLET LANCET  1 each by Misc.(Non-Drug; Combo Route) route 4 (four) times daily. Use to check blood sugars 4x daily     multivitamin per tablet  Commonly known as: THERAGRAN  Take 1 tablet by mouth once daily.     oxyCODONE 5 MG immediate release tablet  Commonly known as: ROXICODONE  Take 1 tablet (5 mg total) by mouth every 6 (six) hours as needed for Pain.     TRUE METRIX GLUCOSE METER Misc  Generic drug: blood-glucose meter  Use to check blood glucose 4x per day.     zinc sulfate 50 mg zinc (220 mg) capsule  Commonly known as: ZINCATE  Take 220 mg by mouth every morning.            STOP taking these medications      amoxicillin-clavulanate 875-125mg 875-125 mg per tablet  Commonly known as: AUGMENTIN     diphenhydrAMINE 25 mg capsule  Commonly known as: BENADRYL     menthol-zinc oxide 0.44-20.6 % Oint  Commonly known as: CALMOSEPTINE     NIFEdipine 90 MG (OSM) 24 hr tablet  Commonly known as: PROCARDIA-XL     nutritional supplements Powd     pantoprazole 40 MG tablet  Commonly known as: PROTONIX     potassium chloride SA 20 MEQ tablet  Commonly known as: K-DUR,KLOR-CON     sodium hypochlorite external solution  Commonly known as: DAKINS              Indwelling Lines/Drains at time of discharge:   Lines/Drains/Airways       Peripherally Inserted Central Catheter Line  Duration             PICC Double Lumen 10/08/24 1038 left brachial <1 day              Drain  Duration                  Urethral Catheter 09/29/24 1714 Straight-tip 16 Fr. 9 days                    Time spent on the discharge of patient: 30 minutes         Colten Rodriguez MD  Department of Hospital  Medicine  [facility] - Stepdown Flex ([place]-14)

## 2024-10-09 NOTE — PLAN OF CARE
[facility] - Stepdown Flex ([place]-14)  Discharge Final Note    Primary Care Provider: Vinod Elise MD    Expected Discharge Date: 10/9/2024  Discharge Plan A and Plan B have been determined by review of patient's clinical status, future medical and therapeutic needs, and coverage/benefits for post-acute care in coordination with multidisciplinary team members.     Final Discharge Note (most recent)       Final Note - 10/09/24 1630          Final Note    Assessment Type Final Discharge Note     Anticipated Discharge Disposition Planned Readmission - Skilled Nursing Facility    [facility] [phone]    What phone number can be called within the next 1-3 days to see how you are doing after discharge? [phone]     Hospital Resources/Appts/Education Provided Appointments scheduled and added to AVS        Post-Acute Status    Post-Acute Authorization Placement     Post-Acute Placement Status Set-up Complete/Auth obtained     Other Status Community Services   Ambulatory wound Care Clinic    Patient choice form signed by patient/caregiver List from CMS Compare;List with quality metrics by geographic area provided     Discharge Delays None known at this time                     Important Message from Medicare  Important Message from Medicare regarding Discharge Appeal Rights: Explained to patient/caregiver, Other (comments) (Verbal consent from Sister, BHARATH Hernandez witnessed.)     Date IMM was signed: 10/09/24  Time IMM was signed: 0857    Contact Info       [facility]    [address] [place] [id number]  [phone]       Next Steps: Follow up          Future Appointments   Date Time Provider Department Center   10/17/2024  1:30 PM EKG, APPT [facility] EKG [facility]   10/17/2024  2:00 PM PACEMAKER, ICD [facility] ARRHPRO [facility]   10/17/2024  2:30 PM Anuja Logan NP [facility] ARRHYTH [facility]   11/6/2024  3:30 PM Patel Stein Jr., PA [facility] ID [facility]         CM met with patient and  spoke with patients sister ( Rochelle Nicholson) via phone [phone]   and discussed discharge plans, patient to DC to [facility]. No  medications delivered to bedside.  and follow up appointment[s] scheduled.   [facility] and no supplemental oxygen required.      Keerthi Alex RN  Case Management  [facility]  [phone]

## 2024-10-09 NOTE — PLAN OF CARE
NURSING HOME ORDERS    10/09/2024  [facility]  GLADIS SHEEHAN - STEPDOWN FLEX ([place]-14)  [address]  [facility] [address]  Dept: [phone]  Loc: [phone]     Admit to Skilled Nursing Home:      Diagnoses:  Active Hospital Problems    Diagnosis  POA    *Osteomyelitis [M86.9]  Yes    Thrombocytosis [D75.839]  Yes    Anemia [D64.9]  Yes    Chronic indwelling Sams catheter [Z97.8]  Not Applicable    Sacral wound [S31.000A]  Yes    Stage 3b chronic kidney disease [N18.32]  Yes    Paroxysmal atrial fibrillation [I48.0]  Yes     Chronic    Wound of left leg [S81.802A]  Yes    Type 2 diabetes mellitus, with long-term current use of insulin [E11.9, Z79.4]  Not Applicable    Essential hypertension [I10]  Yes     Chronic      Resolved Hospital Problems   No resolved problems to display.       Patient is homebound due to:  Osteomyelitis    Allergies:Review of patient's allergies indicates:  No Known Allergies    Vitals:  Routine    Diet: renal diet    Activities:   Activity as tolerated    Goals of Care Treatment Preferences:  Code Status: Full Code    Health care agent: [facility] agent number: [phone]       LaPOST: Yes           Labs:  per protocol    Nursing Precautions:  Pressure ulcer prevention/worsening    Consults:   Wound Care     Miscellaneous Care:   Outpatient Antibiotic Therapy Plan:           1) Infection: 1) R heel osteomyelitis 2) Pelvic osteomyelitis     2) Discharge Antibiotics:     Intravenous antibiotics:  Zosyn 4.5g IV over 4hrs every 8h (Alternative - can do a 13.5g continuous infusion over 24h)  Daptomcyin 8mg/kg IV q24h     Oral antibiotics:  none     3) Therapy Duration:  6 weeks     Estimated end date of IV antibiotics: 11/10/24     4) Outpatient Weekly Labs:     Order the following labs to be drawn on Mondays:   CBC  CMP   CRP     CPK (when on Daptomycin)     5) Fax Lab Results to Infectious Diseases Provider: Patel RODRIGUEZ  ID Clinic Fax Number: [phone]     6) Outpatient Infectious Diseases Follow-up     Follow-up appointment will be arranged by the ID clinic and will be found in the patient's appointments tab.     Prior to discharge, please ensure the patient's follow-up has been scheduled.    If there is still no follow-up scheduled prior to discharge, please send an [employer]ola [employer] message to [facility] or Call Infectious Diseases Dept.                          Diabetes Care:  SN to perform and educate Diabetic management with blood glucose monitoring:      Medications: Discontinue all previous medication orders, if any. See new list below.     Medication List        START taking these medications      0.9% NaCl SolP 50 mL with DAPTOmycin 500 mg SolR 667 mg  Inject 667 mg into the vein once daily. End of therapy: 11/10/24     D5W PgBk 100 mL with piperacillin-tazobactam 4.5 gram SolR 4.5 g  Inject 4.5 g into the vein every 8 (eight) hours. End of therapy: 11/10/24     polyethylene glycol 17 gram Pwpk  Commonly known as: GLYCOLAX  Take 17 g by mouth daily as needed for Constipation.            CHANGE how you take these medications      insulin aspart U-100 100 unit/mL injection  Commonly known as: NovoLOG  Inject 0-5 Units into the skin 3 (three) times daily before meals. 0-149=0  150-200: 0 units  201-250: 2 units  251-300: 3 units  301-350: 4 units  351-1000=5 units, NOTIFY MD  What changed:   how much to take  additional instructions  Another medication with the same name was removed. Continue taking this medication, and follow the directions you see here.     LANTUS SOLOSTAR U-100 INSULIN 100 unit/mL (3 mL) Inpn pen  Generic drug: insulin glargine U-100 (Lantus)  Inject 10 Units into the skin 2 (two) times a day.  What changed: how much to take            CONTINUE taking these medications      acetaminophen 325 MG tablet  Commonly known as: TYLENOL  Take 650 mg by mouth every 6 (six) hours as needed for Pain.     apixaban 5 mg  "Tab  Commonly known as: ELIQUIS  Take 1 tablet (5 mg total) by mouth 2 (two) times daily.     ascorbic acid (vitamin C) 500 MG tablet  Commonly known as: VITAMIN C  Take 500 mg by mouth 2 (two) times daily.     BD ULTRA-FINE ADITYA PEN NEEDLE 32 gauge x 5/32" Ndle  Generic drug: pen needle, diabetic  USE FOUR TIMES DAILY WITH MEALS AND NIGHTLY     blood sugar diagnostic Strp  Commonly known as: CONTOUR TEST STRIPS  1 strip by Misc.(Non-Drug; Combo Route) route 4 (four) times daily. Use to check blood glucose 4x daily     ergocalciferol 50,000 unit Cap  Commonly known as: ERGOCALCIFEROL  Take 1 capsule (50,000 Units total) by mouth Every Friday.  Start taking on: October 11, 2024     ferrous sulfate 325 (65 FE) MG EC tablet  Take 325 mg by mouth 2 (two) times daily.     FLOMAX 0.4 mg Cap  Generic drug: tamsulosin  Take 0.4 mg by mouth once daily.     furosemide 20 MG tablet  Commonly known as: LASIX  Take 1 tablet (20 mg total) by mouth once daily.     gabapentin 300 MG capsule  Commonly known as: NEURONTIN  Take 300 mg by mouth 2 (two) times daily.     lancets Misc  Commonly known as: MICROLET LANCET  1 each by Misc.(Non-Drug; Combo Route) route 4 (four) times daily. Use to check blood sugars 4x daily     multivitamin per tablet  Commonly known as: THERAGRAN  Take 1 tablet by mouth once daily.     oxyCODONE 5 MG immediate release tablet  Commonly known as: ROXICODONE  Take 1 tablet (5 mg total) by mouth every 6 (six) hours as needed for Pain.     TRUE METRIX GLUCOSE METER Misc  Generic drug: blood-glucose meter  Use to check blood glucose 4x per day.     zinc sulfate 50 mg zinc (220 mg) capsule  Commonly known as: ZINCATE  Take 220 mg by mouth every morning.            STOP taking these medications      amoxicillin-clavulanate 875-125mg 875-125 mg per tablet  Commonly known as: AUGMENTIN     diphenhydrAMINE 25 mg capsule  Commonly known as: BENADRYL     menthol-zinc oxide 0.44-20.6 % Oint  Commonly known as: " CALMOSEPTINE     NIFEdipine 90 MG (OSM) 24 hr tablet  Commonly known as: PROCARDIA-XL     nutritional supplements Powd     pantoprazole 40 MG tablet  Commonly known as: PROTONIX     potassium chloride SA 20 MEQ tablet  Commonly known as: K-DUR,KLOR-CON     sodium hypochlorite external solution  Commonly known as: DAKINS                Immunizations Administered as of 10/9/2024       Name Date Dose VIS Date Route Exp Date    COVID-19, MRNA, LN-S, PF (Moderna) 7/13/2021 0.5 mL -- -- --    Lot: [id number]     COVID-19, MRNA, LN-S, PF (Moderna) 7/13/2021 0.5 mL -- Intramuscular --    Site: Left arm     : [employer]     Lot: [id number]     Comment: Adminis     COVID-19, MRNA, LN-S, PF (Moderna) 5/24/2021 0.5 mL -- -- --    Lot: [id number]     COVID-19, MRNA, LN-S, PF (Moderna) 5/24/2021 0.5 mL -- -- --    : [employer]     Lot: [id number]     Comment: Adminis             Some patients may experience side effects after vaccination.  These may include fever, headache, muscle or joint aches.  Most symptoms resolve with 24-48 hours and do not require urgent medical evaluation unless they persist for more than 72 hours or symptoms are concerning for an unrelated medical condition.          _________________________________  Colten Rodriguez MD  10/09/2024

## 2024-10-09 NOTE — PLAN OF CARE
Patient in bed, asleep,  easily arousable. VSS on RA. No complaints or concerns at this time. Safety and isolation precautions maintained and call light in reach.       Problem: Pain Acute  Goal: Optimal Pain Control and Function  Outcome: Progressing     Problem: Fall Injury Risk  Goal: Absence of Fall and Fall-Related Injury  Outcome: Progressing     Problem: Skin Injury Risk Increased  Goal: Skin Health and Integrity  Outcome: Progressing

## 2024-10-09 NOTE — Clinical Note
Diagnosis: Status post PICC central line placement [395635]   Future Attending Provider: TYLOR ADLER [[id number]]   Is the patient being sent to ED Observation?: No   Special Needs:: No Special Needs [1]

## 2024-10-10 VITALS
RESPIRATION RATE: 17 BRPM | OXYGEN SATURATION: 100 % | TEMPERATURE: 98 F | HEART RATE: 81 BPM | WEIGHT: 183.88 LBS | SYSTOLIC BLOOD PRESSURE: 145 MMHG | HEIGHT: 69 IN | BODY MASS INDEX: 27.24 KG/M2 | DIASTOLIC BLOOD PRESSURE: 70 MMHG

## 2024-10-10 PROCEDURE — 36569 INSJ PICC 5 YR+ W/O IMAGING: CPT | Mod: HCNC

## 2024-10-10 PROCEDURE — G0378 HOSPITAL OBSERVATION PER HR: HCPCS | Mod: HCNC

## 2024-10-10 PROCEDURE — C1751 CATH, INF, PER/CENT/MIDLINE: HCPCS | Mod: HCNC

## 2024-10-10 PROCEDURE — 25000003 PHARM REV CODE 250: Mod: HCNC | Performed by: EMERGENCY MEDICINE

## 2024-10-10 RX ORDER — ACETAMINOPHEN 500 MG
1000 TABLET ORAL
Status: COMPLETED | OUTPATIENT
Start: 2024-10-10 | End: 2024-10-10

## 2024-10-10 RX ORDER — SODIUM CHLORIDE 0.9 % (FLUSH) 0.9 %
10 SYRINGE (ML) INJECTION EVERY 6 HOURS
Status: DISCONTINUED | OUTPATIENT
Start: 2024-10-10 | End: 2024-10-10 | Stop reason: HOSPADM

## 2024-10-10 RX ORDER — SODIUM CHLORIDE 0.9 % (FLUSH) 0.9 %
10 SYRINGE (ML) INJECTION
Status: DISCONTINUED | OUTPATIENT
Start: 2024-10-10 | End: 2024-10-10 | Stop reason: HOSPADM

## 2024-10-10 RX ADMIN — ACETAMINOPHEN 1000 MG: 500 TABLET ORAL at 03:10

## 2024-10-10 NOTE — ED NOTES
Patient arrived to the ED from [facility]. Patient was recently had a PICC line placed for IV abx but the PICC line was removed when he was d/c. Was sent to ED so that a PICC line is placed for his abx. Patient is alert x 4, L BKA.     Patient identifiers for Saji Castañeda checked and correct.    LOC: The patient is awake, alert and aware of environment with an appropriate affect, the patient is oriented x 4 and speaking appropriately.    APPEARANCE: Patient resting comfortably and in no acute distress, patient is clean and well groomed, patient's clothing is properly fastened.    SKIN: The skin is warm and dry, color consistent with ethnicity. +Sacral and left buttocks wounds staged 4.    MUSCULOSKELETAL: Patient moving all extremities well, no obvious swelling or deformities noted. +L BKA, bedbound     RESPIRATORY: Airway is open and patent, respirations are spontaneous and even, patient has a normal effort and rate.    CARDIAC: Patient has a normal rate and rhythm, no periphreal edema noted, capillary refill < 3 seconds. Normal +2 pedal pulses present.    ABDOMEN: Soft and non tender to palpation, no distention noted. Patient denies any nausea, vomiting, diarrhea, or constipation.     NEUROLOGIC: Eyes open spontaneously, PERRL, behavior appropriate to situation, follows commands, facial expression symmetrical, bilateral hand grasp equal and even, purposeful motor response noted, normal sensation in all extremities.     HEENT: No abnormalities noted. White sclera and pupils equal round and reactive to light. Denies headache, dizziness.     : Pt voids independently, denies dysuria, hematuria, frequency.

## 2024-10-10 NOTE — ED NOTES
Patient had a bowel movement, patient has been changed into a new gown and wiped down clean. Patient's bed sheets and rafiq pads have been changed. Patient has no further needs at this time. Care on going.

## 2024-10-10 NOTE — PROCEDURES
"Saji Castañeda is a 74 y.o. male patient.    Temp: 98.4 °F (36.9 °C) (10/10/24 0026)  Pulse: 78 (10/10/24 0018)  Resp: 18 (10/09/24 2258)  BP: (!) 149/66 (10/10/24 0018)  SpO2: 100 % (10/10/24 0018)  Weight: 83.4 kg (183 lb 13.8 oz) (10/09/24 2258)  Height: 5' 9" (175.3 cm) (10/09/24 2258)    PICC  Date/Time: 10/10/2024 1:00 AM  Performed by: Denys Patel RN  Consent Done: Yes  Time out: Immediately prior to procedure a time out was called to verify the correct patient, procedure, equipment, support staff and site/side marked as required  Indications: med administration and vascular access  Anesthesia: local infiltration  Local anesthetic: lidocaine 1% without epinephrine  Anesthetic Total (mL): 2  Preparation: skin prepped with ChloraPrep  Skin prep agent dried: skin prep agent completely dried prior to procedure  Sterile barriers: all five maximum sterile barriers used - cap, mask, sterile gown, sterile gloves, and large sterile sheet  Hand hygiene: hand hygiene performed prior to central venous catheter insertion  Location details: left brachial  Catheter type: double lumen  Catheter size: 5 Fr  Catheter Length: 47cm    Ultrasound guidance: yes  Vessel Caliber: medium, compressibility normal  Vascular Doppler: not done  Needle advanced into vessel with real time Ultrasound guidance.  Guidewire confirmed in vessel.  Sterile sheath used.  Number of attempts: 1  Post-procedure: blood return through all ports, chlorhexidine patch and sterile dressing applied    Assessment: successful placement, tip termination and placement verified by x-ray          Name Mirza Patel RN   10/10/2024    "

## 2024-10-10 NOTE — ED NOTES
Patient has been changed and cleaned. Pictures of wounds have been uploaded to [facility].

## 2024-10-10 NOTE — ED PROVIDER NOTES
Encounter Date: 10/9/2024       History     Chief Complaint   Patient presents with    IV ABX     Patient was d/c yesterday from hospital. Patient had a PICC line when he was admitted to hospital but it was removed. Patient needs PICC access so that he can get started with IV ABX for wounds, was sent to ED from [facility].  Hx of sacral wound, L BKA.      HPI  Pleasant 52-year-old male presents the ER for evaluation of accidental PICC line removal.  Patient was admitted, discharged yesterday with PICC line for osteomyelitis.  Patient reports that his nurse was unaware of the PICC line and accidentally removed it.  He was no other complaints.        Review of patient's allergies indicates:  No Known Allergies  Past Medical History:   Diagnosis Date    Arthritis     legs    Bacteremia due to Gram-negative bacteria 3/23/2021    Diabetes mellitus     Diabetes mellitus, type 2     Hyperlipidemia     Hypertension     Osteomyelitis     Palliative care encounter 5/24/2023     Past Surgical History:   Procedure Laterality Date    ABOVE-KNEE AMPUTATION Left 3/27/2024    Procedure: AMPUTATION, ABOVE KNEE;  Surgeon: Adal Arellano MD;  Location: [facility] OR [address] [place] [address];  Service: Vascular;  Laterality: Left;    ANGIOGRAPHY OF LOWER EXTREMITY N/A 2/3/2021    Procedure: Angiogram Extremity Bilateral;  Surgeon: Ernst Chacko MD;  Location: [facility] OR [address] [place] [address];  Service: Peripheral Vascular;  Laterality: N/A;  7.4 mintues fluoroscopy time  816.15 mGy  170.17 Gycm2    AORTOGRAPHY WITH EXTREMITY RUNOFF Bilateral 2/3/2021    Procedure: AORTOGRAM, WITH EXTREMITY RUNOFF;  Surgeon: Ernst Chacko MD;  Location: [facility] OR [address] [place] [address];  Service: Peripheral Vascular;  Laterality: Bilateral;    DEBRIDEMENT OF FOOT Left 2/23/2021    Procedure: DEBRIDEMENT, LEFT HEEL;  Surgeon: Marya Schroeder DPM;  Location: [facility] OR [address] [place] [address];  Service: Podiatry;  Laterality: Left;    ESOPHAGOGASTRODUODENOSCOPY N/A 6/28/2024    Procedure: EGD  (ESOPHAGOGASTRODUODENOSCOPY);  Surgeon: Adal Chandra MD;  Location: [facility] ENDO;  Service: Gastroenterology;  Laterality: N/A;    FOOT AMPUTATION  October 2010    left high midfoot amputation    IMPLANTATION OF LEADLESS PACEMAKER N/A 10/12/2023    Procedure: [id number];  Surgeon: VANESSA Delatorre MD;  Location: [facility] EP LAB;  Service: Cardiology;  Laterality: N/A;  AVB, MICRA, EH, [place] [id number]     Family History   Problem Relation Name Age of Onset    Diabetes Mother      Heart disease Mother      Stroke Sister      Cancer Brother      Diabetes Sister       Social History     Tobacco Use    Smoking status: Never    Smokeless tobacco: Never   Substance Use Topics    Alcohol use: No     Comment: occassional    Drug use: No     Review of Systems   Constitutional:  Negative for fever.   Respiratory:  Negative for shortness of breath.    Cardiovascular:  Negative for chest pain.   All other systems reviewed and are negative.      Physical Exam     Initial Vitals [10/09/24 2258]   BP Pulse Resp Temp SpO2   (!) 140/60 79 18 98.9 °F (37.2 °C) 100 %      MAP       --         Physical Exam    Nursing note and vitals reviewed.  Constitutional:   Elderly chronically ill-appearing no distress   HENT:   Head: Normocephalic and atraumatic.   Eyes: Pupils are equal, round, and reactive to light.   Neck:   Normal range of motion.  Cardiovascular:  Normal rate and regular rhythm.           Pulmonary/Chest: No respiratory distress.   Musculoskeletal:      Cervical back: Normal range of motion.     Neurological: He is alert and oriented to person, place, and time. GCS score is 15. GCS eye subscore is 4. GCS verbal subscore is 5. GCS motor subscore is 6.   Skin: Skin is warm and dry.   Psychiatric: He has a normal mood and affect. Thought content normal.         ED Course   Procedures  Labs Reviewed - No data to display       Imaging Results              X-Ray Chest 1 View (Final result)  Result time  "10/10/24 01:45:59      Final result by Max Pena MD (10/10/24 01:45:59)                   Impression:      Left-sided PICC overlies the SVC.      Electronically signed by: Max Pena MD  Date:    10/10/2024  Time:    01:45               Narrative:    EXAMINATION:  XR CHEST 1 VIEW    CLINICAL HISTORY:  Provided history is "PICC insertion;  ".    TECHNIQUE:  One view of the chest.    COMPARISON:  10/08/2024.    FINDINGS:  Left-sided PICC overlies the SVC, likely slightly advanced compared to prior study.  Cardiac silhouette is stable.  No detrimental change in lung aeration.                                       Medications   sodium chloride 0.9% flush 10 mL (has no administration in time range)     And   sodium chloride 0.9% flush 10 mL (has no administration in time range)   acetaminophen tablet 1,000 mg (1,000 mg Oral Given 10/10/24 0312)     Medical Decision Making  74-year-old male presents the ER for evaluation of accidental PICC line removal.  He was on IV antibiotics for osteomyelitis.  He was no other complaints.  Will plan to consult PICC team for replacement and plan on discharge afterwards.    Amount and/or Complexity of Data Reviewed  Independent Historian: EMS  External Data Reviewed: notes.  Radiology: ordered and independent interpretation performed. Decision-making details documented in ED Course.               ED Course as of 10/10/24 0329   Thu Oct 10, 2024   0221 X-Ray Chest 1 View  Resting in bed no distress.  PICC line placed by PICC team x-ray reviewed it was in adequate position.  Admission orders were initially placed in air but after discussing with the admission team, they were canceled.  Patient was stable to return back to his facility for continued care as plan [SE]      ED Course User Index  [SE] Trinity Hays MD                           Clinical Impression:  Final diagnoses:  [Z95.828] Status post PICC central line placement (Primary)          ED Disposition " Condition    Discharge Stable          ED Prescriptions    None       Follow-up Information    None          Trinity Hays MD  10/10/24 8526

## 2024-10-10 NOTE — PLAN OF CARE
Previous admission orders got released by ED nurse and [facility] medicine encounter created erroneously. Discussed with ED staff, who will d/c patient.

## 2024-10-10 NOTE — DISCHARGE INSTRUCTIONS
Thank you for coming in to see us at [facility] It was nice to meet you, and I hope you feel better soon. Please feel free to return to the ER at any time should your symptoms get worse, or if you have different emergent concerns.    Our goal in the emergency department is to always give you outstanding care and exceptional service. You may receive a survey by mail or e-mail in the next week regarding your experience in our ED. We would greatly appreciate your completing and returning the survey. Your feedback provides us with a way to recognize our staff who give very good care and it helps us learn how to improve when your experience was below our aspiration of excellence.      It is important to remember that some problems or medical conditions are difficult to diagnose and may not be found or addressed during your Emergency Department visit.  These conditions often start with non-specific symptoms and can only be diagnosed on follow up visits with your primary care physician or specialist when the symptoms continue or change. Please remember that all medical conditions can change, and we cannot predict how you will be feeling tomorrow or the next day.    Return to the ER with any questions/concerns, new/concerning symptoms, worsening, failure to improve or inability to obtain follow-up.       Be sure to follow up with your primary care doctor and review all labs/imaging/tests that were performed during your ER visit with them. It is very common for us to identify non-emergent incidental findings which must be followed up with your primary care physician.  Some labs/imaging/tests may be outside of the normal range, and require non-emergent follow-up and/or further investigation/treatment/procedures/testing to help diagnose/exclude/prevent complications or other potentially serious medical conditions. Some abnormalities may not have been discussed or addressed during your ER visit. Some lab  results may not return during your ER visit but can be accessible by downloading the free [facility] cy or by visiting [email or web address] . It is important for you to review all labs/imaging/tests which are outside of the normal range with your physician.    An ER visit does not replace a primary care visit, and many screening tests or follow-up tests cannot be ordered by an ER doctor or performed by the ER. Some tests may even require pre-approval.    If you do not have a primary care doctor, you may contact the one listed on your discharge paperwork or you may also call the [facility] Appointment Desk at [phone] , or [facility] at  [phone] to schedule an appointment, or establish care with a primary care doctor or even a specialist and to obtain information about local resources. It is important to your health that you have a primary care doctor.    Please take all medications as directed. We have done our best to select a medication for you that will treat your condition however, all medications may potentially have side-effects and it is impossible to predict which medications may give you side-effects or what those side-effects (if any) those medications may give you.  If you feel that you are having a negative effect or side-effect of any medication you should stop taking those medications immediately and seek medical attention. If you feel that you are having a life-threatening reaction call 911.        Do not drive, swim, climb to height, take a bath, operate heavy machinery, drink alcohol or take potentially sedating medications, sign any legal documents or make any important decisions for 24 hours if you have received any pain medications, sedatives or mood altering drugs during your ER visit or within 24 hours of taking them if they have been prescribed to you.     You can find additional resources for Dentists, hearing aids, durable medical equipment, low cost pharmacies and  other resources at [email or web address]

## 2024-10-15 ENCOUNTER — OUTSIDE PLACE OF SERVICE (OUTPATIENT)
Dept: ADMINISTRATIVE | Facility: OTHER | Age: 74
End: 2024-10-15
Payer: MEDICARE

## 2024-10-15 PROCEDURE — 99308 SBSQ NF CARE LOW MDM 20: CPT | Mod: ,,, | Performed by: FAMILY MEDICINE

## 2024-10-15 NOTE — PROGRESS NOTES
Mr. Castañeda is a patient of Dr. Delatorre.      Subjective:   Patient ID:  Saji Castañeda is a 74 y.o. male who presents for follow up of Pacemaker Check and Atrial Fibrillation  .     HPI:    Mr. Castañeda is a 74 y.o. male with AF, AVB, HTN, HLD, DM, PVD, left foot amputation, chronic osteomyelitis here for follow up after leadless pacemaker implantation.     Background:    10/13/2023: Mr. Castañeda has a  past medical history significant for paroxysmal atrial fibrillation, periods of complete heart block, periods of second degree AV block, Mobitz type I (Wenckebach), hypertension, hyperlipidemia, type II diabetes mellitus, peripheral vascular disease s/p left foot amputation and chronic osteomyelitis, sepsis secondary to ESBL Klebsiella and  Acinetobacter bacteremia - possibly from his prior indwelling PICC line or his chronic left heel ulceration, maintained on IV antibiotics as per ID recommendations, and obesity. He underwent successful implantation of a leadless pacemaker in the setting of his increased risk of infection and his periods of complete heart block on 10/12/2023.     Update (10/17/2024):    Today he says he feels at baseline. In stretcher and accompanied by medical transport. Lives in nursing home. Denies RICCI or CP. Denies palpitations. Denies syncope.    On eliquis 5mg BID for CVA prophylaxis.     Device Interrogation (10/17/2024) reveals an intrinsic CHB with stable device function. He paces 44% in the RV. Estimated battery longevity 8 years.     I have personally reviewed the patient's EKG today, which shows  at 93bpm. QRS is 178. QTc is 524.    Relevant Cardiac Test Results:    2D Echo (7/26/2024):    Left Ventricle: The left ventricle is normal in size. There is concentric hypertrophy. Normal wall motion. Septal motion is consistent with bundle branch block. There is normal systolic function with a visually estimated ejection fraction of 55 - 60%. Biplane (2D) method of discs ejection  "fraction is 50%.    Right Ventricle: Normal right ventricular cavity size. Systolic function is normal. TAPSE is 1.96 cm.    Right Atrium: Right atrium is moderately dilated.    Aortic Valve: There is mild aortic regurgitation.    Tricuspid Valve: There is mild regurgitation.    IVC/SVC: Normal venous pressure at 3 mmHg.    Current Outpatient Medications   Medication Sig    0.9% NaCl SolP 50 mL with DAPTOmycin 500 mg SolR 667 mg Inject 667 mg into the vein once daily. End of therapy: 11/10/24    acetaminophen (TYLENOL) 325 MG tablet Take 650 mg by mouth every 6 (six) hours as needed for Pain.    apixaban (ELIQUIS) 5 mg Tab Take 1 tablet (5 mg total) by mouth 2 (two) times daily.    ascorbic acid, vitamin C, (VITAMIN C) 500 MG tablet Take 500 mg by mouth 2 (two) times daily.    BD ULTRA-FINE ADITYA PEN NEEDLE 32 gauge x 5/32" Ndle USE FOUR TIMES DAILY WITH MEALS AND NIGHTLY    blood sugar diagnostic (CONTOUR TEST STRIPS) Strp 1 strip by Misc.(Non-Drug; Combo Route) route 4 (four) times daily. Use to check blood glucose 4x daily    D5W PgBk 100 mL with piperacillin-tazobactam 4.5 gram SolR 4.5 g Inject 4.5 g into the vein every 8 (eight) hours. End of therapy: 11/10/24    ergocalciferol (ERGOCALCIFEROL) 50,000 unit Cap Take 1 capsule (50,000 Units total) by mouth Every Friday.    ferrous sulfate 325 (65 FE) MG EC tablet Take 325 mg by mouth 2 (two) times daily.    furosemide (LASIX) 20 MG tablet Take 1 tablet (20 mg total) by mouth once daily.    gabapentin (NEURONTIN) 300 MG capsule Take 300 mg by mouth 2 (two) times daily.    insulin aspart U-100 (NOVOLOG) 100 unit/mL injection Inject 0-5 Units into the skin 3 (three) times daily before meals. 0-149=0  150-200: 0 units  201-250: 2 units  251-300: 3 units  301-350: 4 units  351-1000=5 units, NOTIFY MD    insulin glargine U-100, Lantus, (LANTUS SOLOSTAR U-100 INSULIN) 100 unit/mL (3 mL) InPn pen Inject 10 Units into the skin 2 (two) times a day.    lancets (MICROLET " "LANCET) Misc 1 each by Misc.(Non-Drug; Combo Route) route 4 (four) times daily. Use to check blood sugars 4x daily    multivitamin (THERAGRAN) per tablet Take 1 tablet by mouth once daily.    oxyCODONE (ROXICODONE) 5 MG immediate release tablet Take 1 tablet (5 mg total) by mouth every 6 (six) hours as needed for Pain.    polyethylene glycol (GLYCOLAX) 17 gram PwPk Take 17 g by mouth daily as needed for Constipation.    tamsulosin (FLOMAX) 0.4 mg Cap Take 0.4 mg by mouth once daily.    TRUE METRIX GLUCOSE METER Misc Use to check blood glucose 4x per day.    zinc sulfate (ZINCATE) 50 mg zinc (220 mg) capsule Take 220 mg by mouth every morning.     No current facility-administered medications for this visit.       Review of Systems   Constitutional: Negative for malaise/fatigue.   Cardiovascular:  Negative for chest pain, dyspnea on exertion, irregular heartbeat, leg swelling and palpitations.   Respiratory:  Negative for shortness of breath.    Hematologic/Lymphatic: Negative for bleeding problem.   Skin:  Negative for rash.   Musculoskeletal:  Negative for myalgias.   Gastrointestinal:  Negative for hematemesis, hematochezia and nausea.   Genitourinary:  Negative for hematuria.   Neurological:  Negative for light-headedness.   Psychiatric/Behavioral:  Negative for altered mental status.    Allergic/Immunologic: Negative for persistent infections.       Objective:          /71   Pulse 93   Ht 5' 9" (1.753 m)   BMI 27.15 kg/m²     Physical Exam  Vitals and nursing note reviewed.   Constitutional:       Appearance: Normal appearance. He is well-developed.   HENT:      Head: Normocephalic.      Nose: Nose normal.   Eyes:      Pupils: Pupils are equal, round, and reactive to light.   Cardiovascular:      Rate and Rhythm: Normal rate and regular rhythm.   Pulmonary:      Effort: No respiratory distress.   Musculoskeletal:         General: Normal range of motion.      Left Lower Extremity: Left leg is amputated " below knee.   Skin:     General: Skin is warm and dry.      Findings: No erythema.   Neurological:      General: No focal deficit present.      Mental Status: He is alert.   Psychiatric:         Speech: Speech normal.         Behavior: Behavior normal.           Lab Results   Component Value Date     10/09/2024    K 3.4 (L) 10/09/2024    MG 1.7 10/09/2024    BUN 15 10/09/2024    CREATININE 0.8 10/09/2024    ALT 16 10/09/2024    AST 28 10/09/2024    HGB 7.6 (L) 10/09/2024    HCT 26.2 (L) 10/09/2024    HCT 26.2 (L) 05/19/2024    TSH 1.563 07/25/2024    LDLCALC 54.6 (L) 07/25/2024       Recent Labs   Lab 03/20/24  0833 03/29/24  1331 05/19/24  2100 09/29/24  1446   INR 1.2 1.1 1.3 H 1.2       Assessment:     1. Pacemaker - leadless    2. Paroxysmal atrial fibrillation    3. Essential hypertension    4. Asymptomatic Mobitz (type) I (Wenckebach's) atrioventricular block    5. Chronic anticoagulation      Plan:     In summary, Mr. Castañeda is a 74 y.o. male with AF, AVB, HTN, HLD, DM, PVD, left foot amputation, chronic osteomyelitis here for follow up after leadless pacemaker implantation.  He is 1 year s/p leadless PPM due to periods of CHB and chronic infection. Stable device function. RV pacing 44%. CHB today. On eliquis for CVA prophylaxis.    Continue current medication regimen and device settings.   Follow up in device clinic as scheduled. Manual report schedule provided.  Follow up in EP clinic in 1 year, sooner as needed.     *A copy of this note has been sent to Dr. Delatorre*    Follow up in about 1 year (around 10/17/2025).    ------------------------------------------------------------------    ANIYAH Newman, NP-C  Cardiac Electrophysiology

## 2024-10-16 ENCOUNTER — TELEPHONE (OUTPATIENT)
Dept: ELECTROPHYSIOLOGY | Facility: CLINIC | Age: 74
End: 2024-10-16
Payer: MEDICARE

## 2024-10-17 ENCOUNTER — HOSPITAL ENCOUNTER (OUTPATIENT)
Dept: CARDIOLOGY | Facility: CLINIC | Age: 74
Discharge: HOME | End: 2024-10-17
Payer: MEDICARE

## 2024-10-17 ENCOUNTER — OFFICE VISIT (OUTPATIENT)
Dept: ELECTROPHYSIOLOGY | Facility: CLINIC | Age: 74
End: 2024-10-17
Payer: MEDICARE

## 2024-10-17 ENCOUNTER — CLINICAL SUPPORT (OUTPATIENT)
Dept: CARDIOLOGY | Facility: HOSPITAL | Age: 74
End: 2024-10-17
Attending: STUDENT IN AN ORGANIZED HEALTH CARE EDUCATION/TRAINING PROGRAM
Payer: MEDICARE

## 2024-10-17 VITALS
HEIGHT: 69 IN | HEART RATE: 93 BPM | SYSTOLIC BLOOD PRESSURE: 130 MMHG | BODY MASS INDEX: 27.15 KG/M2 | DIASTOLIC BLOOD PRESSURE: 71 MMHG

## 2024-10-17 DIAGNOSIS — Z79.01 CHRONIC ANTICOAGULATION: ICD-10-CM

## 2024-10-17 DIAGNOSIS — Z95.0 PACEMAKER: Primary | Chronic | ICD-10-CM

## 2024-10-17 DIAGNOSIS — I49.8 OTHER CARDIAC ARRHYTHMIA: ICD-10-CM

## 2024-10-17 DIAGNOSIS — I10 ESSENTIAL HYPERTENSION: Chronic | ICD-10-CM

## 2024-10-17 DIAGNOSIS — I44.1 MOBITZ (TYPE) I (WENCKEBACH'S) ATRIOVENTRICULAR BLOCK: ICD-10-CM

## 2024-10-17 DIAGNOSIS — I48.0 PAROXYSMAL ATRIAL FIBRILLATION: Chronic | ICD-10-CM

## 2024-10-17 PROCEDURE — 93279 PRGRMG DEV EVAL PM/LDLS PM: CPT | Mod: HCNC

## 2024-10-17 PROCEDURE — 3044F HG A1C LEVEL LT 7.0%: CPT | Mod: HCNC,CPTII,S$GLB, | Performed by: NURSE PRACTITIONER

## 2024-10-17 PROCEDURE — 3288F FALL RISK ASSESSMENT DOCD: CPT | Mod: HCNC,CPTII,S$GLB, | Performed by: NURSE PRACTITIONER

## 2024-10-17 PROCEDURE — 1101F PT FALLS ASSESS-DOCD LE1/YR: CPT | Mod: HCNC,CPTII,S$GLB, | Performed by: NURSE PRACTITIONER

## 2024-10-17 PROCEDURE — 93279 PRGRMG DEV EVAL PM/LDLS PM: CPT | Mod: 26,HCNC,, | Performed by: STUDENT IN AN ORGANIZED HEALTH CARE EDUCATION/TRAINING PROGRAM

## 2024-10-17 PROCEDURE — 99214 OFFICE O/P EST MOD 30 MIN: CPT | Mod: HCNC,S$GLB,, | Performed by: NURSE PRACTITIONER

## 2024-10-17 PROCEDURE — 3075F SYST BP GE 130 - 139MM HG: CPT | Mod: HCNC,CPTII,S$GLB, | Performed by: NURSE PRACTITIONER

## 2024-10-17 PROCEDURE — 1125F AMNT PAIN NOTED PAIN PRSNT: CPT | Mod: HCNC,CPTII,S$GLB, | Performed by: NURSE PRACTITIONER

## 2024-10-17 PROCEDURE — 99999 PR PBB SHADOW E&M-EST. PATIENT-LVL III: CPT | Mod: PBBFAC,HCNC,, | Performed by: NURSE PRACTITIONER

## 2024-10-17 PROCEDURE — 1111F DSCHRG MED/CURRENT MED MERGE: CPT | Mod: HCNC,CPTII,S$GLB, | Performed by: NURSE PRACTITIONER

## 2024-10-17 PROCEDURE — 3078F DIAST BP <80 MM HG: CPT | Mod: HCNC,CPTII,S$GLB, | Performed by: NURSE PRACTITIONER

## 2024-10-17 PROCEDURE — 1157F ADVNC CARE PLAN IN RCRD: CPT | Mod: HCNC,CPTII,S$GLB, | Performed by: NURSE PRACTITIONER

## 2024-10-17 PROCEDURE — 1159F MED LIST DOCD IN RCRD: CPT | Mod: HCNC,CPTII,S$GLB, | Performed by: NURSE PRACTITIONER

## 2024-10-17 PROCEDURE — 1160F RVW MEDS BY RX/DR IN RCRD: CPT | Mod: HCNC,CPTII,S$GLB, | Performed by: NURSE PRACTITIONER

## 2024-10-22 ENCOUNTER — HOSPITAL ENCOUNTER (EMERGENCY)
Facility: HOSPITAL | Age: 74
Discharge: HOME | End: 2024-10-22
Attending: EMERGENCY MEDICINE
Payer: MEDICARE

## 2024-10-22 VITALS
SYSTOLIC BLOOD PRESSURE: 141 MMHG | HEIGHT: 69 IN | RESPIRATION RATE: 16 BRPM | BODY MASS INDEX: 27.4 KG/M2 | WEIGHT: 185 LBS | HEART RATE: 78 BPM | OXYGEN SATURATION: 98 % | TEMPERATURE: 99 F | DIASTOLIC BLOOD PRESSURE: 74 MMHG

## 2024-10-22 DIAGNOSIS — Z95.828 S/P PICC CENTRAL LINE PLACEMENT: Primary | ICD-10-CM

## 2024-10-22 PROCEDURE — 36569 INSJ PICC 5 YR+ W/O IMAGING: CPT | Mod: HCNC

## 2024-10-22 PROCEDURE — 99285 EMERGENCY DEPT VISIT HI MDM: CPT | Mod: 25,HCNC

## 2024-10-22 PROCEDURE — C1751 CATH, INF, PER/CENT/MIDLINE: HCPCS | Mod: HCNC

## 2024-10-22 RX ORDER — SODIUM CHLORIDE 0.9 % (FLUSH) 0.9 %
10 SYRINGE (ML) INJECTION EVERY 12 HOURS PRN
Status: DISCONTINUED | OUTPATIENT
Start: 2024-10-22 | End: 2024-10-23 | Stop reason: HOSPADM
